# Patient Record
Sex: FEMALE | Race: WHITE | NOT HISPANIC OR LATINO | Employment: OTHER | URBAN - METROPOLITAN AREA
[De-identification: names, ages, dates, MRNs, and addresses within clinical notes are randomized per-mention and may not be internally consistent; named-entity substitution may affect disease eponyms.]

---

## 2017-01-01 ENCOUNTER — OFFICE VISIT (OUTPATIENT)
Dept: URGENT CARE | Facility: CLINIC | Age: 27
End: 2017-01-01
Payer: MEDICARE

## 2017-01-01 VITALS
DIASTOLIC BLOOD PRESSURE: 72 MMHG | SYSTOLIC BLOOD PRESSURE: 118 MMHG | TEMPERATURE: 98.6 F | BODY MASS INDEX: 44.3 KG/M2 | RESPIRATION RATE: 16 BRPM | HEART RATE: 87 BPM | OXYGEN SATURATION: 97 % | HEIGHT: 63 IN | WEIGHT: 250 LBS

## 2017-01-01 DIAGNOSIS — H65.193 OTHER ACUTE NONSUPPURATIVE OTITIS MEDIA OF BOTH EARS, RECURRENCE NOT SPECIFIED: ICD-10-CM

## 2017-01-01 DIAGNOSIS — J01.90 ACUTE RHINOSINUSITIS: ICD-10-CM

## 2017-01-01 PROCEDURE — 99214 OFFICE O/P EST MOD 30 MIN: CPT | Performed by: NURSE PRACTITIONER

## 2017-01-01 NOTE — PROGRESS NOTES
Chief Complaint   Patient presents with   • Sinus Problem     pt was seen x 4 days for ear infection in both ears, started cefpodoxime thursady, ears are getting worse and poss sinus infection       HISTORY OF PRESENT ILLNESS: Patient is a 26 y.o. female who presents today due to concerns of a recent ear infection. She was seen by her PCP four days ago and diagnosed with double ear infection, was placed on Cefpodoxime which she has been taking as prescribed. She believes her ears are somewhat improving but is now concerned about her headache, sinus pressure, subjective fever and chills.       Patient Active Problem List    Diagnosis Date Noted   • Quadriparesis ([facility]) 11/01/2016     Priority: High   • Paralysis ([facility]) 10/27/2016     Priority: High   • Sinus tachycardia 10/31/2013     Priority: High   • HTN (hypertension) 11/01/2016     Priority: Medium   • Chronic pain syndrome 10/27/2016     Priority: Medium   • Dysphagia 10/27/2016     Priority: Medium   • Depression 10/28/2016     Priority: Low   • Schizophrenia ([facility]) 10/27/2016     Priority: Low   • Suprapubic catheter ([facility]) 10/27/2016     Priority: Low   • Psychosis, schizophrenia, simple ([facility]) 09/29/2016     Priority: Low     Class: Chronic   • Hypothyroidism 11/23/2015     Priority: Low   • PCOS (polycystic ovarian syndrome) 11/23/2015     Priority: Low   • Chronic back pain 06/29/2015     Priority: Low   • Progressive focal motor weakness 06/28/2015     Priority: Low   • Fatty liver disease, nonalcoholic 01/19/2015     Priority: Low   • Anxiety 12/16/2014     Priority: Low   • Knee pain, right 02/13/2014     Priority: Low   • Neurogenic bladder 04/02/2011     Priority: Low   • Chronic UTI 09/18/2010     Priority: Low   • Multiple personality disorder 09/18/2010     Priority: Low   • Hypovitaminosis D 11/29/2016   • Bowel and bladder incontinence 10/27/2016   • Galactorrhea 07/22/2016   • Elevated sedimentation rate 06/27/2016   • Weakness of both lower  extremities 06/22/2016   • Right flank pain 06/06/2016   • Chronic inflammatory arthritis 05/23/2016   • Vitamin D deficiency 05/21/2016   • TONYA on CPAP 03/07/2016   • Morbidly obese (HCC) 03/07/2016   • Conversion disorder 03/07/2016   • Scoliosis 03/07/2016   • GERD (gastroesophageal reflux disease) 03/07/2016   • Peripheral neuropathy (HCC) 03/06/2016   • H/O prior ablation treatment 02/10/2016       Allergies:Cefdinir; Depakote; Abilify; Amitriptyline; Amoxicillin; Ciprofloxacin; Clindamycin; Doxycycline; Ees; Flagyl; Flomax; Metformin; Sulfa drugs; Tape; Vancomycin; Cephalexin; and Levofloxacin    Current Outpatient Prescriptions Ordered in [facility]   Medication Sig Dispense Refill   • cefpodoxime (VANTIN) 100 MG tablet Take 2 Tabs by mouth 2 times a day for 10 days. 40 Tab 0   • Cholecalciferol (VITAMIN D3) 93973 UNITS Cap Take 1 Each by mouth every 7 days. 4 Cap 4   • naproxen (NAPROSYN) 500 MG Tab Take 500 mg by mouth 2 times a day as needed (pain).     • lactulose 10 GM/15ML Solution Take 15 mL by mouth 2 times a day as needed. 1 Bottle 3   • albuterol 108 (90 BASE) MCG/ACT Aero Soln inhalation aerosol Inhale 2 Puffs by mouth every 6 hours as needed for Shortness of Breath.     • Coenzyme Q10 (EQL COQ10) 300 MG Cap Take 1 Cap by mouth every day at 6 PM.     • cyanocobalamin (HM VITAMIN B12) 500 MCG tablet Take 1,000 mcg by mouth 2 times a day.     • Melatonin 5 MG Tab Take 10 mg by mouth at bedtime as needed (sleep aid).     • Zinc Oxide 11.3 % Cream Apply 1 Application to affected area(s) 1 time daily as needed (for skin irritations to coccyx).     • sodium bicarbonate 325 MG Tab Take 2 Tabs by mouth 3 times a day.     • ziprasidone (GEODON) 80 MG Cap Take 160 mg by mouth every evening.     • promethazine (PHENERGAN) 25 MG Suppos Insert 1 Suppository in rectum every 6 hours as needed for Nausea/Vomiting. 16 Suppository 0   • levothyroxine (SYNTHROID) 75 MCG Tab Take 75 mcg by mouth Every morning on an empty  stomach.     • Ivabradine HCl (CORLANOR) 5 MG Tab Take 1 Tab by mouth 2 Times a Day. 60 Tab 6   • Gabapentin, Once-Daily, (GRALISE) 600 MG Tab Take 1 Tab by mouth 3 times a day. Pt reports she is taking 1 tab in a.m. and 2 tabs in p.m.     • omeprazole (PRILOSEC) 20 MG delayed-release capsule Take 20 mg by mouth 2 times a day. takes 1x daily most of time     • ranitidine (ZANTAC) 150 MG Tab Take 150 mg by mouth 2 times a day.       No current [facility]-ordered facility-administered medications on file.       Past Medical History   Diagnosis Date   • Scoliosis    • Multiple personality disorder    • Chronic UTI 9/18/2010   • Heart burn    • Pain 08-15-12     back, 7/10   • History of falling    • Sinus tachycardia 10/31/2013   • PCOS (polycystic ovarian syndrome)    • Urinary incontinence    • Hypertension    • Gynecological disorder      PCOS   • Disorder of thyroid    • Obesity    • Pneumonia 2012   • Tuberculosis      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   • Sleep apnea      CPAP   • Arthritis      osteo   • ASTHMA      when around smoke   • Urinary bladder disorder      SP cath   • Breath shortness      with tachycardia   • Anginal syndrome      random chest pain especially with tachycardia   • Psychosis        Social History   Substance Use Topics   • Smoking status: Passive Smoke Exposure - Never Smoker   • Smokeless tobacco: Never Used   • Alcohol Use: No       Family Status   Relation Status Death Age   • Mother Alive      44 2016   • Maternal Uncle Alive    • Maternal Grandmother Alive    • Father Alive      bio father hx unknown   • Sister Alive      Family History   Problem Relation Age of Onset   • Hypertension Mother    • Sleep Apnea Mother    • Heart Disease Mother    • Other Mother      hypothryod   • Hypertension Maternal Uncle    • Heart Disease Maternal Grandmother    • Hypertension Maternal Grandmother    • Other Sister      Narcolepsy;fibromyalsia;bone;nerve   • Genitourinary () Sister       "endometriosis       ROS:  Review of Systems   Constitutional: Positive for fever, chills. Negative for weight loss and malaise/fatigue.   HENT: Positive for ear pain, congestion, sinus pressure. Negative for sore throat and neck pain.    Eyes: Negative for vision changes.   Neuro: Negative for headache, sensory changes, weakness, seizure, LOC.   Cardiovascular: Negative for chest pain, palpitations, orthopnea and leg swelling.   Respiratory: Positive for cough. Negative for sputum production, shortness of breath and wheezing.   Gastrointestinal: Negative for abdominal pain, nausea, vomiting or diarrhea.   Genitourinary: Negative for dysuria, urgency and frequency.  Musculoskeletal: Negative for falls, neck pain, back pain, joint pain, myalgias.   Skin: Negative for rash, diaphoresis.     Exam:  Blood pressure 118/72, pulse 87, temperature 37 °C (98.6 °F), resp. rate 16, height 1.588 m (5' 2.5\"), weight 113.399 kg (250 lb), last menstrual period 07/01/2016, SpO2 97 %, not currently breastfeeding.  General: well-nourished, well-developed female in NAD  Head: normocephalic, atraumatic  Eyes: PERRLA, no conjunctival injection, acuity grossly intact, lids normal.  Ears: normal shape and symmetry, no tenderness, no discharge. External canals are without any significant edema or erythema. Bilateral tympanic membranes are mildly injected, no effusion, appeared to be healing well. Gross auditory acuity is intact.  Nose: symmetrical without tenderness, mild discharge.  Mouth/Throat: reasonable hygiene, no erythema, exudates or tonsillar enlargement.  Neck: no masses, range of motion within normal limits, no tracheal deviation. No obvious thyroid enlargement.   Lymph: no cervical adenopathy. No supraclavicular adenopathy.   Neuro: alert and oriented. Cranial nerves 1-12 grossly intact. No sensory deficit.   Cardiovascular: regular rate and rhythm. No edema.  Pulmonary: no distress. Chest is symmetrical with respiration, no " wheezes, crackles, or rhonchi.   Musculoskeletal: no clubbing, appropriate muscle tone, gait is stable.  Skin: warm, dry, intact, no clubbing, no cyanosis, no rashes.   Psych: appropriate mood, affect, judgement.         Assessment/Plan:  1. Acute rhinosinusitis     2. Other acute nonsuppurative otitis media of both ears, recurrence not specified         Previous clinic visit encounter reviewed and considered in medical decision making today.   Considering the patient's extensive allergy list, the medication that she is on the appropriate choice for her for ear infection and sinus infection. Her ear infection appears to be healing well. I have encouraged patient to continue the prescribed antibiotics as directed  Supportive care, differential diagnoses, and indications for immediate follow-up discussed with patient.   Pathogenesis of diagnosis discussed including typical length and natural progression.   Instructed to return to clinic or nearest emergency department for any change in condition, further concerns, or worsening of symptoms.  Patient states understanding of the plan of care and discharge instructions.  Instructed to make an appointment, for follow up, with their primary care provider.        Please note that this dictation was created using voice recognition software. I have made every reasonable attempt to correct obvious errors, but I expect that there are errors of grammar and possibly content that I did not discover before finalizing the note.      PER Mehta.

## 2017-01-01 NOTE — MR AVS SNAPSHOT
"        Kristinmarielena Balderrama   2017 9:15 AM   Office Visit   MRN: [id number]    Department:  [facility]   Dept Phone:  [phone]    Description:  Female : 1990   Provider:  VANIA Acosta           Reason for Visit     Sinus Problem pt was seen x 4 days for ear infection in both ears, started cefpodoxime thursady, ears are getting worse and poss sinus infection      Allergies as of 2017     Allergen Noted Reactions    Cefdinir 2016   Shortness of Breath, Itching    RXN=unknown    Depakote [Divalproex Sodium] 2010   Unspecified    Muscle spasms/muscle pain and weakness  RXN=unknown    Abilify 2013   Unspecified    Headaches/muscle twitching  RXN=unknown    Amitriptyline 10/31/2013   Unspecified    Headaches  RXN=unknown    Amoxicillin 2010   Rash    RXN=unknown    Ciprofloxacin 2009   Rash    RXN=unknown    Clindamycin 2011   Nausea    Even with food  RXN=unkown    Doxycycline 08/15/2012   Vomiting, Nausea    RXN=unknown    Ees [Erythromycin] 2010   Vomiting, Nausea    RXN=unknown    Flagyl [Metronidazole Hcl] 2011   Unspecified    \"eye problems\"  RXN=uknown    Flomax [Tamsulosin Hydrochloride] 2009   Swelling    RXN=unknown    Metformin 2013   Unspecified    Increased lactic acid   RXN=unknown    Sulfa Drugs 2010   Hives, Rash    RXN=since childhood    Tape 08/15/2012   Rash    Tears skin off   coban with Tegaderm tape ok  RXN=ongoing    Vancomycin 07/10/2016   Itching    Pt becomes flushed in face and chest.   RXN=7/10/16    Cephalexin [Keflex] 2017   Rash    Pt states she gets a rash with this medication    Levofloxacin 10/27/2016   Unspecified    Leg muscle cramps      You were diagnosed with     Acute sinusitis, recurrence not specified, unspecified location   [[id number]]         Vital Signs     Blood Pressure Pulse Temperature Respirations Height Weight    118/72 mmHg 87 37 °C (98.6 °F) 16 1.588 m (5' 2.5\") 113.399 " kg (250 lb)    Body Mass Index Oxygen Saturation Last Menstrual Period Breastfeeding? Smoking Status       44.97 kg/m2 97% 07/01/2016 No Passive Smoke Exposure - Never Smoker       Basic Information     Date Of Birth Sex Race Ethnicity Preferred Language    1990 Female White Non- English      Your appointments     Jan 03, 2017 To Be Determined   SN-HH-HOME VISIT + LPN SUP with Shaista Maki R.N.   [facility] (--)    [address]  [place] [address]   [phone]            Fredrick 10, 2017 To Be Determined   SN-HH-HOME VISIT + LPN SUP with Shaista Maki R.N.   [facility] (--)    [address]  [place] [address]   [phone]            Jan 12, 2017  9:20 AM   Established Patient with Torres Brody M.D.   [facility] [address]    [address]  [address]  [place] [address] [phone]           You will be receiving a confirmation call a few days before your appointment from our automated call confirmation system.            Jan 31, 2017  9:00 AM   Follow Up Med Management with Ronny Burnette M.D.   [facility] [address] [place] [address])    [address] [facility] [address]  [address]  [place] [address]   [phone]            Mar 01, 2017  9:00 AM   Individual Therapy with Blanca Brantley L.C.S.W.   [facility])    [address] [facility]  [address]  [place] [address] [phone]            Mar 15, 2017  9:00 AM   Individual Therapy with Blanca Brantley L.C.S.W.   [facility])    [address] [facility]  [address]  [place] [address] [phone]            Mar 29, 2017  9:00 AM   Individual Therapy with Blanca Brantley L.C.S.W.   [facility] ([facility])    [address] [facility]  [address]  [place] [address] [phone]            Apr 12, 2017  9:00 AM   Individual Therapy with Blanca Brantley L.C.S.W.   [facility] ([facility])    [address] [facility]  [address]  [place] [address]   [phone]            May 23, 2017  9:40 AM      FOLLOW UP with Torres Kumar M.D.   [facility] (--)    [address]  [place] [address] [phone]              Problem List              ICD-10-CM Priority Class Noted - Resolved    Chronic UTI N39.0 Low  9/18/2010 - Present    Multiple personality disorder F44.81 Low  9/18/2010 - Present    Neurogenic bladder N31.9 Low  4/2/2011 - Present    Sinus tachycardia R00.0 High  10/31/2013 - Present    Knee pain, right M25.561 Low  2/13/2014 - Present    Anxiety F41.9 Low  12/16/2014 - Present    Fatty liver disease, nonalcoholic K76.0 Low  1/19/2015 - Present    Progressive focal motor weakness M62.81 Low  6/28/2015 - Present    Chronic back pain M54.9, G89.29 Low  6/29/2015 - Present    Hypothyroidism E03.9 Low  11/23/2015 - Present    PCOS (polycystic ovarian syndrome) E28.2 Low  11/23/2015 - Present    H/O prior ablation treatment Z98.890   2/10/2016 - Present    Peripheral neuropathy (HCC) G62.9   3/6/2016 - Present    TONYA on CPAP G47.33   3/7/2016 - Present    Morbidly obese (HCC) E66.01   3/7/2016 - Present    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   3/7/2016 - Present    Scoliosis M41.9   3/7/2016 - Present    GERD (gastroesophageal reflux disease) K21.9   3/7/2016 - Present    Vitamin D deficiency E55.9   5/21/2016 - Present    Chronic inflammatory arthritis M19.90   5/23/2016 - Present    Right flank pain R10.9   6/6/2016 - Present    Weakness of both lower extremities M62.81   6/22/2016 - Present    Elevated sedimentation rate R70.0   6/27/2016 - Present    Galactorrhea O92.6   7/22/2016 - Present    Psychosis, schizophrenia, simple (HCC) (Chronic) F20.89 Low Chronic 9/29/2016 - Present    Schizophrenia (HCC) F20.9 Low  10/27/2016 - Present    Paralysis (HCC) G83.9 High  10/27/2016 - Present    Chronic pain syndrome G89.4 Medium  10/27/2016 - Present    Suprapubic catheter (HCC) Z93.59 Low  10/27/2016 - Present    Bowel and bladder incontinence R32, R15.9    "10/27/2016 - Present    Dysphagia R13.10 Medium  10/27/2016 - Present    Depression F32.9 Low  10/28/2016 - Present    HTN (hypertension) I10 Medium  11/1/2016 - Present    Quadriparesis (HCC) G82.50 High  11/1/2016 - Present    Hypovitaminosis D E55.9   11/29/2016 - Present      Health Maintenance        Date Due Completion Dates    IMM HEP A VACCINE (1 of 2 - Standard Series) 10/13/1990 ---    IMM VARICELLA (CHICKENPOX) VACCINE (1 of 2 - 2 Dose Adolescent Series) 10/13/2002 ---    PAP SMEAR 7/22/2019 7/22/2016 (Postponed), 2/19/2013 (N/S)    Override on 7/22/2016: Postponed (per pt was told could \"skip\" 2016)    Override on 2/19/2013: (N/S) ([facility])    IMM DTaP/Tdap/Td Vaccine (7 - Td) 8/6/2022 8/6/2012, 9/18/2010, 3/3/1994, 5/17/1991, 5/10/1990, 3/23/1990, 1990    COLONOSCOPY 9/25/2023 9/25/2013            Current Immunizations     Dtap Vaccine 3/3/1994, 5/17/1991, 5/10/1990, 3/23/1990, 1990    HIB Vaccine(PEDVAX) 1/11/1991    HPV Quadrivalent Vaccine (GARDASIL) 10/27/2011, 5/27/2011, 3/1/2011    Hepatitis B Vaccine Non-Recombivax (Ped/Adol) 1/28/2004, 10/28/2003, 10/29/1999    Influenza TIV (IM) 9/5/2013, 12/7/2011, 11/7/2011    Influenza Vaccine Adult HD 10/5/2016    MMR Vaccine 3/3/1994, 1/11/1991    OPV - Historical Data 3/3/1994, 5/17/1991, 5/10/1990, 3/23/1990, 1989    Pneumococcal Vaccine (PCV7) Historical Data 11/8/2015    TD Vaccine 9/18/2010  4:45 PM    Tdap Vaccine 8/6/2012      Below and/or attached are the medications your provider expects you to take. Review all of your home medications and newly ordered medications with your provider and/or pharmacist. Follow medication instructions as directed by your provider and/or pharmacist. Please keep your medication list with you and share with your provider. Update the information when medications are discontinued, doses are changed, or new medications (including over-the-counter products) are added; and carry medication information at all " times in the event of emergency situations     Allergies:  CEFDINIR - Shortness of Breath,Itching     DEPAKOTE - Unspecified     ABILIFY - Unspecified     AMITRIPTYLINE - Unspecified     AMOXICILLIN - Rash     CIPROFLOXACIN - Rash     CLINDAMYCIN - Nausea     DOXYCYCLINE - Vomiting,Nausea     EES - Vomiting,Nausea     FLAGYL - Unspecified     FLOMAX - Swelling     METFORMIN - Unspecified     SULFA DRUGS - Hives,Rash     TAPE - Rash     VANCOMYCIN - Itching     CEPHALEXIN - Rash     LEVOFLOXACIN - Unspecified               Medications  Valid as of: January 01, 2017 - 10:23 AM    Generic Name Brand Name Tablet Size Instructions for use    Albuterol Sulfate (Aero Soln) albuterol 108 (90 BASE) MCG/ACT Inhale 2 Puffs by mouth every 6 hours as needed for Shortness of Breath.        Cefpodoxime Proxetil (Tab) VANTIN 100 MG Take 2 Tabs by mouth 2 times a day for 10 days.        Cholecalciferol (Cap) Vitamin D3 85738 UNITS Take 1 Each by mouth every 7 days.        Coenzyme Q10 (Cap) Coenzyme Q10 300 MG Take 1 Cap by mouth every day at 6 PM.        Cyanocobalamin (Tab) vitamin b12 500 MCG Take 1,000 mcg by mouth 2 times a day.        Gabapentin (Once-Daily) (Tab) Gabapentin (Once-Daily) 600 MG Take 1 Tab by mouth 3 times a day. Pt reports she is taking 1 tab in a.m. and 2 tabs in p.m.        Ivabradine HCl (Tab) CORLANOR 5 MG Take 1 Tab by mouth 2 Times a Day.        Lactulose (Solution) lactulose 10 GM/15ML Take 15 mL by mouth 2 times a day as needed.        Levothyroxine Sodium (Tab) SYNTHROID 75 MCG Take 75 mcg by mouth Every morning on an empty stomach.        Melatonin (Tab) Melatonin 5 MG Take 10 mg by mouth at bedtime as needed (sleep aid).        Naproxen (Tab) NAPROSYN 500 MG Take 500 mg by mouth 2 times a day as needed (pain).        Omeprazole (CAPSULE DELAYED RELEASE) PRILOSEC 20 MG Take 20 mg by mouth 2 times a day. takes 1x daily most of time        Promethazine HCl (Suppos) PHENERGAN 25 MG Insert 1 Suppository  in rectum every 6 hours as needed for Nausea/Vomiting.        RaNITidine HCl (Tab) ZANTAC 150 MG Take 150 mg by mouth 2 times a day.        Sodium Bicarbonate (Tab) sodium bicarbonate 325 MG Take 2 Tabs by mouth 3 times a day.        Zinc Oxide (Cream) Zinc Oxide 11.3 % Apply 1 Application to affected area(s) 1 time daily as needed (for skin irritations to coccyx).        Ziprasidone HCl (Cap) GEODON 80 MG Take 160 mg by mouth every evening.        .                 Medicines prescribed today were sent to:     [id number] - [place], [place] - [address] [place] [address]    Phone: [phone] Fax: [phone]    Open 24 Hours?: No      Medication refill instructions:       If your prescription bottle indicates you have medication refills left, it is not necessary to call your provider’s office. Please contact your pharmacy and they will refill your medication.    If your prescription bottle indicates you do not have any refills left, you may request refills at any time through one of the following ways: The online [employer] system (except Urgent Care), by calling your provider’s office, or by asking your pharmacy to contact your provider’s office with a refill request. Medication refills are processed only during regular business hours and may not be available until the next business day. Your provider may request additional information or to have a follow-up visit with you prior to refilling your medication.   *Please Note: Medication refills are assigned a new Rx number when refilled electronically. Your pharmacy may indicate that no refills were authorized even though a new prescription for the same medication is available at the pharmacy. Please request the medicine by name with the pharmacy before contacting your provider for a refill.        Other Notes About Your Plan     DME:  [facility] / ph [phone] / fax [phone]              [employer] Access Code: Activation code  not generated  Current MyChart Status: Active

## 2017-01-02 ENCOUNTER — HOME CARE VISIT (OUTPATIENT)
Dept: HOME HEALTH SERVICES | Facility: HOME HEALTHCARE | Age: 27
End: 2017-01-02
Payer: MEDICARE

## 2017-01-02 ENCOUNTER — OFFICE VISIT (OUTPATIENT)
Dept: URGENT CARE | Facility: CLINIC | Age: 27
End: 2017-01-02
Payer: MEDICARE

## 2017-01-02 VITALS
HEART RATE: 103 BPM | BODY MASS INDEX: 46.01 KG/M2 | TEMPERATURE: 98.3 F | SYSTOLIC BLOOD PRESSURE: 116 MMHG | WEIGHT: 250 LBS | HEIGHT: 62 IN | DIASTOLIC BLOOD PRESSURE: 72 MMHG | OXYGEN SATURATION: 99 %

## 2017-01-02 DIAGNOSIS — N61.0 CELLULITIS OF LEFT BREAST: Primary | ICD-10-CM

## 2017-01-02 PROCEDURE — 99214 OFFICE O/P EST MOD 30 MIN: CPT | Performed by: PHYSICIAN ASSISTANT

## 2017-01-02 PROCEDURE — G0495 RN CARE TRAIN/EDU IN HH: HCPCS

## 2017-01-02 RX ORDER — PROMETHAZINE HYDROCHLORIDE 25 MG/1
25 TABLET ORAL EVERY 6 HOURS PRN
Qty: 30 TAB | Refills: 0 | Status: SHIPPED | OUTPATIENT
Start: 2017-01-02 | End: 2017-05-02 | Stop reason: SDUPTHER

## 2017-01-02 RX ORDER — CLINDAMYCIN HYDROCHLORIDE 300 MG/1
300 CAPSULE ORAL 3 TIMES DAILY
Qty: 30 CAP | Refills: 0 | Status: SHIPPED | OUTPATIENT
Start: 2017-01-02 | End: 2017-01-22

## 2017-01-02 NOTE — PROGRESS NOTES
"Subjective:      Pt is a 26 y.o. female who presents with Breast Problem            Breast Problem  This is a new problem. The current episode started 1 to 4 weeks ago. The problem occurs constantly. The problem has been gradually worsening. The symptoms are aggravated by exertion. She has tried ice (cefpodoxime) for the symptoms. The treatment provided no relief.   Pt states for the last 2 weeks she has had a red swollen left breast with a \"sore on it\". She states she has multiple allergies to abx and medicines. She states she was seen yesterday at the , but forgot to mention the issue. She states she has had a chronic sinus and ear infection and is on cefpodoxime for the last 5 days which she states is not helping her left breast sore. Pt has not taken any Rx medications for this condition. Pt states the pain is a 7/10, aching in nature and worse at night. Pt denies CP, SOB, NVD, paresthesias, headaches, dizziness, change in vision, hives, or other joint pain. The pt's medication list, problem list, and allergies have been evaluated and reviewed during today's visit.    PMH:  Past Medical History   Diagnosis Date   • Scoliosis    • Multiple personality disorder    • Chronic UTI 9/18/2010   • Heart burn    • Pain 08-15-12     back, 7/10   • History of falling    • Sinus tachycardia 10/31/2013   • PCOS (polycystic ovarian syndrome)    • Urinary incontinence    • Hypertension    • Gynecological disorder      PCOS   • Disorder of thyroid    • Obesity    • Pneumonia 2012   • Tuberculosis      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   • Sleep apnea      CPAP   • Arthritis      osteo   • ASTHMA      when around smoke   • Urinary bladder disorder      SP cath   • Breath shortness      with tachycardia   • Anginal syndrome      random chest pain especially with tachycardia   • Psychosis        PSH:  Past Surgical History   Procedure Laterality Date   • Neuro dest facet l/s w/ig sngl  4/21/2015     Performed by Reza" Leander at [facility]   • Recovery  1/27/2016     Procedure: CATH LAB EP STUDY WITH SINUS NODE MODIFICATION ABHINAV;  Surgeon: Lina Surgery;  [place] [facility]  Service:    • Katie by laparoscopy  8/29/2010     Performed by SHAYY JOHNS at SURGERY [facility] ORS   • Lumbar fusion anterior  8/21/2012     Performed by SUSIE SAWANT at SURGERY [facility] ORS   • Other cardiac surgery  1/2016     cardiac ablation   • Other       tonsillectomy   • Bowel stimulator insertion  7/13/2016     Procedure: BOWEL STIMULATOR INSERTION FOR PERMANENT INTERSTIM SACRAL IMPLANT;  Surgeon: Joe Noyola M.D.;  Location: SURGERY [facility];  Service:        Fam Hx:    family history includes Genitourinary () in her sister; Heart Disease in her maternal grandmother and mother; Hypertension in her maternal grandmother, maternal uncle, and mother; Other in her mother and sister; Sleep Apnea in her mother.  Family Status   Relation Status Death Age   • Mother Alive      44 2016   • Maternal Uncle Alive    • Maternal Grandmother Alive    • Father Alive      bio father hx unknown   • Sister Alive        Soc HX:  Social History     Social History   • Marital Status: Single     Spouse Name: N/A   • Number of Children: N/A   • Years of Education: N/A     Occupational History   • Not on file.     Social History Main Topics   • Smoking status: Passive Smoke Exposure - Never Smoker   • Smokeless tobacco: Never Used   • Alcohol Use: No   • Drug Use: No   • Sexual Activity: Not Currently     Birth Control/ Protection: Pill     Other Topics Concern   • Not on file     Social History Narrative    ** Merged History Encounter **              Medications:    Current outpatient prescriptions:   •  promethazine (PHENERGAN) 25 MG Tab, Take 1 Tab by mouth every 6 hours as needed for Nausea/Vomiting., Disp: 30 Tab, Rfl: 0  •  clindamycin (CLEOCIN) 300 MG Cap, Take 1 Cap by mouth 3 times a day.,  Disp: 30 Cap, Rfl: 0  •  cefpodoxime (VANTIN) 100 MG tablet, Take 2 Tabs by mouth 2 times a day for 10 days., Disp: 40 Tab, Rfl: 0  •  Cholecalciferol (VITAMIN D3) 85122 UNITS Cap, Take 1 Each by mouth every 7 days., Disp: 4 Cap, Rfl: 4  •  naproxen (NAPROSYN) 500 MG Tab, Take 500 mg by mouth 2 times a day as needed (pain)., Disp: , Rfl:   •  lactulose 10 GM/15ML Solution, Take 15 mL by mouth 2 times a day as needed., Disp: 1 Bottle, Rfl: 3  •  albuterol 108 (90 BASE) MCG/ACT Aero Soln inhalation aerosol, Inhale 2 Puffs by mouth every 6 hours as needed for Shortness of Breath., Disp: , Rfl:   •  Coenzyme Q10 (EQL COQ10) 300 MG Cap, Take 1 Cap by mouth every day at 6 PM., Disp: , Rfl:   •  cyanocobalamin (HM VITAMIN B12) 500 MCG tablet, Take 1,000 mcg by mouth 2 times a day., Disp: , Rfl:   •  Melatonin 5 MG Tab, Take 10 mg by mouth at bedtime as needed (sleep aid)., Disp: , Rfl:   •  Zinc Oxide 11.3 % Cream, Apply 1 Application to affected area(s) 1 time daily as needed (for skin irritations to coccyx)., Disp: , Rfl:   •  sodium bicarbonate 325 MG Tab, Take 2 Tabs by mouth 3 times a day., Disp: , Rfl:   •  ziprasidone (GEODON) 80 MG Cap, Take 160 mg by mouth every evening., Disp: , Rfl:   •  promethazine (PHENERGAN) 25 MG Suppos, Insert 1 Suppository in rectum every 6 hours as needed for Nausea/Vomiting., Disp: 16 Suppository, Rfl: 0  •  levothyroxine (SYNTHROID) 75 MCG Tab, Take 75 mcg by mouth Every morning on an empty stomach., Disp: , Rfl:   •  Ivabradine HCl (CORLANOR) 5 MG Tab, Take 1 Tab by mouth 2 Times a Day., Disp: 60 Tab, Rfl: 6  •  Gabapentin, Once-Daily, (GRALISE) 600 MG Tab, Take 1 Tab by mouth 3 times a day. Pt reports she is taking 1 tab in a.m. and 2 tabs in p.m., Disp: , Rfl:   •  omeprazole (PRILOSEC) 20 MG delayed-release capsule, Take 20 mg by mouth 2 times a day. takes 1x daily most of time, Disp: , Rfl:   •  ranitidine (ZANTAC) 150 MG Tab, Take 150 mg by mouth 2 times a day., Disp: , Rfl:  "      Allergies:  Cefdinir; Depakote; Abilify; Amitriptyline; Amoxicillin; Ciprofloxacin; Clindamycin; Doxycycline; Ees; Flagyl; Flomax; Metformin; Sulfa drugs; Tape; Vancomycin; Cephalexin; and Levofloxacin      ROS  Constitutional: Negative for fever, chills and malaise/fatigue.   HENT: Negative for congestion and sore throat.    Eyes: Negative for blurred vision, double vision and photophobia.   Respiratory: Negative for cough and shortness of breath.    Cardiovascular: Negative for chest pain and palpitations.   Gastrointestinal: Negative for heartburn, nausea, vomiting, abdominal pain, diarrhea and constipation.   Genitourinary: Negative for dysuria and flank pain.   Musculoskeletal: Negative for joint pain and myalgias.   Skin: Negative for itching and rash. +left breast infection.  Neurological: Negative for dizziness, tingling and headaches.   Endo/Heme/Allergies: Does not bruise/bleed easily.   Psychiatric/Behavioral: Negative for depression. The patient is not nervous/anxious.           Objective:     /72 mmHg  Pulse 103  Temp(Src) 36.8 °C (98.3 °F)  Resp   Ht 1.581 m (5' 2.25\")  Wt 113.399 kg (250 lb)  BMI 45.37 kg/m2  SpO2 99%  LMP 07/01/2016  Breastfeeding? No     Physical Exam   Pulmonary/Chest: Chest wall is not dull to percussion. She exhibits tenderness, edema and swelling. She exhibits no mass, no bony tenderness, no laceration, no crepitus, no deformity and no retraction. Right breast exhibits no inverted nipple, no mass, no nipple discharge, no skin change and no tenderness. Left breast exhibits skin change and tenderness. Left breast exhibits no inverted nipple, no mass and no nipple discharge. Breasts are symmetrical. There is breast swelling.       Genitourinary: There is breast tenderness. No breast discharge or bleeding.         Constitutional: PT is oriented to person, place, and time. PT appears well-developed and well-nourished. No distress.   HENT:   Head: Normocephalic " and atraumatic.   Mouth/Throat: Oropharynx is clear and moist. No oropharyngeal exudate.   Eyes: Conjunctivae normal and EOM are normal. Pupils are equal, round, and reactive to light.   Neck: Normal range of motion. Neck supple. No thyromegaly present.   Cardiovascular: Normal rate, regular rhythm, normal heart sounds and intact distal pulses.  Exam reveals no gallop and no friction rub.    No murmur heard.  Pulmonary/Chest: Effort normal and breath sounds normal. No respiratory distress. PT has no wheezes. PT has no rales. Pt exhibits no tenderness.   Abdominal: Soft. Bowel sounds are normal. PT exhibits no distension and no mass. There is no tenderness. There is no rebound and no guarding.   Musculoskeletal: Normal range of motion. PT exhibits no edema and no tenderness.   Neurological: PT is alert and oriented to person, place, and time. PT has normal reflexes. No cranial nerve deficit.   Skin: Skin is warm and dry. No rash noted. PT is not diaphoretic. No erythema.       Psychiatric: PT has a normal mood and affect. PT behavior is normal. Judgment and thought content normal.          Assessment/Plan:     1. Cellulitis of left breast    - promethazine (PHENERGAN) 25 MG Tab; Take 1 Tab by mouth every 6 hours as needed for Nausea/Vomiting.  Dispense: 30 Tab; Refill: 0  - clindamycin (CLEOCIN) 300 MG Cap; Take 1 Cap by mouth 3 times a day.  Dispense: 30 Cap; Refill: 0    Pt states she gets nausea on clindamycin and with all of her other allergies to abx, this seems the best choice with taking phenergan 20-30 mins prior to her clindamycin  Rest, fluids encouraged.  AVS with medical info given.  Pt was in full understanding and agreement with the plan.  Follow-up as needed if symptoms worsen or fail to improve.

## 2017-01-02 NOTE — MR AVS SNAPSHOT
"        Kristin Tacos   2017 8:15 AM   Office Visit   MRN: [id number]    Department[place]  [facility] [place]   Dept Phone:  [phone]    Description:  Female : 1990   Provider:  Pineda Bansal PA-C           Reason for Visit     Breast Problem lft breast has wound, red swollen x 2wks, getting worse      Allergies as of 2017     Allergen Noted Reactions    Cefdinir 2016   Shortness of Breath, Itching    RXN=unknown    Depakote [Divalproex Sodium] 2010   Unspecified    Muscle spasms/muscle pain and weakness  RXN=unknown    Abilify 2013   Unspecified    Headaches/muscle twitching  RXN=unknown    Amitriptyline 10/31/2013   Unspecified    Headaches  RXN=unknown    Amoxicillin 2010   Rash    RXN=unknown    Ciprofloxacin 2009   Rash    RXN=unknown    Clindamycin 2011   Nausea    Even with food  RXN=unkown    Doxycycline 08/15/2012   Vomiting, Nausea    RXN=unknown    Ees [Erythromycin] 2010   Vomiting, Nausea    RXN=unknown    Flagyl [Metronidazole Hcl] 2011   Unspecified    \"eye problems\"  RXN=uknown    Flomax [Tamsulosin Hydrochloride] 2009   Swelling    RXN=unknown    Metformin 2013   Unspecified    Increased lactic acid   RXN=unknown    Sulfa Drugs 2010   Hives, Rash    RXN=since childhood    Tape 08/15/2012   Rash    Tears skin off   coban with Tegaderm tape ok  RXN=ongoing    Vancomycin 07/10/2016   Itching    Pt becomes flushed in face and chest.   RXN=7/10/16    Cephalexin [Keflex] 2017   Rash    Pt states she gets a rash with this medication    Levofloxacin 10/27/2016   Unspecified    Leg muscle cramps      You were diagnosed with     Cellulitis of left breast   [[id number]]  -  Primary       Vital Signs     Blood Pressure Pulse Temperature Height Weight       116/72 mmHg 103 36.8 °C (98.3 °F) 1.581 m (5' 2.25\") 113.399 kg (250 lb)     Body Mass Index Oxygen Saturation Last Menstrual Period Breastfeeding? Smoking Status      " 45.37 kg/m2 99% 07/01/2016 No Passive Smoke Exposure - Never Smoker       Basic Information     Date Of Birth Sex Race Ethnicity Preferred Language    1990 Female White Non- English      Your appointments     Jan 03, 2017 To Be Determined   SN-HH-HOME VISIT + LPN SUP with Shaista Maki R.N.   [facility] (--)    [address]  [place] [address]   [phone]            Fredrick 10, 2017 To Be Determined   SN-HH-HOME VISIT + LPN SUP with Shaista Maki R.N.   [facility] (--)    [address]  [place] [address]   [phone]            Jan 12, 2017  9:20 AM   Established Patient with Torres Brody M.D.   [facility] [address]    [address]  [address]  [place] [address]   [phone]           You will be receiving a confirmation call a few days before your appointment from our automated call confirmation system.            Jan 31, 2017  9:00 AM   Follow Up Med Management with Ronny Burnette M.D.   [facility] [address] [place] [address])    [address] [place] [address]  [address]  [place] [address]   [phone]            Mar 01, 2017  9:00 AM   Individual Therapy with Blanca Brantley L.C.S.W.   [facility]    [address] [facility]  [address]  [place] [address] [phone]            Mar 15, 2017  9:00 AM   Individual Therapy with Blanca Brantley L.C.S.W.   [facility])    [address] [facility]  [address]  [place] [address] [phone]            Mar 29, 2017  9:00 AM   Individual Therapy with Blanca Brantley L.C.S.W.   [facility])    [address] [facility]  [address]  [place] [address] [phone]            Apr 12, 2017  9:00 AM   Individual Therapy with Blanca Brantley L.C.S.W.   [facility])    [address] [facility]  [address]  [place] [address] [phone]            May 23, 2017  9:40 AM   FOLLOW UP with Torres Kumar M.D.   [facility] (--)    [address]  [facility] [address]   [phone]              Problem List              ICD-10-CM Priority Class Noted - Resolved    Chronic UTI N39.0 Low  9/18/2010 - Present    Multiple personality disorder F44.81 Low  9/18/2010 - Present    Neurogenic bladder N31.9 Low  4/2/2011 - Present    Sinus tachycardia R00.0 High  10/31/2013 - Present    Knee pain, right M25.561 Low  2/13/2014 - Present    Anxiety F41.9 Low  12/16/2014 - Present    Fatty liver disease, nonalcoholic K76.0 Low  1/19/2015 - Present    Progressive focal motor weakness M62.81 Low  6/28/2015 - Present    Chronic back pain M54.9, G89.29 Low  6/29/2015 - Present    Hypothyroidism E03.9 Low  11/23/2015 - Present    PCOS (polycystic ovarian syndrome) E28.2 Low  11/23/2015 - Present    H/O prior ablation treatment Z98.890   2/10/2016 - Present    Peripheral neuropathy (HCC) G62.9   3/6/2016 - Present    TONYA on CPAP G47.33   3/7/2016 - Present    Morbidly obese (HCC) E66.01   3/7/2016 - Present    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   3/7/2016 - Present    Scoliosis M41.9   3/7/2016 - Present    GERD (gastroesophageal reflux disease) K21.9   3/7/2016 - Present    Vitamin D deficiency E55.9   5/21/2016 - Present    Chronic inflammatory arthritis M19.90   5/23/2016 - Present    Right flank pain R10.9   6/6/2016 - Present    Weakness of both lower extremities M62.81   6/22/2016 - Present    Elevated sedimentation rate R70.0   6/27/2016 - Present    Galactorrhea O92.6   7/22/2016 - Present    Psychosis, schizophrenia, simple (HCC) (Chronic) F20.89 Low Chronic 9/29/2016 - Present    Schizophrenia (HCC) F20.9 Low  10/27/2016 - Present    Paralysis (HCC) G83.9 High  10/27/2016 - Present    Chronic pain syndrome G89.4 Medium  10/27/2016 - Present    Suprapubic catheter (HCC) Z93.59 Low  10/27/2016 - Present    Bowel and bladder incontinence R32, R15.9   10/27/2016 - Present    Dysphagia R13.10 Medium  10/27/2016 - Present    Depression F32.9 Low  10/28/2016 - Present  "   HTN (hypertension) I10 Medium  11/1/2016 - Present    Quadriparesis (HCC) G82.50 High  11/1/2016 - Present    Hypovitaminosis D E55.9   11/29/2016 - Present      Health Maintenance        Date Due Completion Dates    IMM HEP A VACCINE (1 of 2 - Standard Series) 10/13/1990 ---    IMM VARICELLA (CHICKENPOX) VACCINE (1 of 2 - 2 Dose Adolescent Series) 10/13/2002 ---    PAP SMEAR 7/22/2019 7/22/2016 (Postponed), 2/19/2013 (N/S)    Override on 7/22/2016: Postponed (per pt was told could \"skip\" 2016)    Override on 2/19/2013: (N/S) ([facility])    IMM DTaP/Tdap/Td Vaccine (7 - Td) 8/6/2022 8/6/2012, 9/18/2010, 3/3/1994, 5/17/1991, 5/10/1990, 3/23/1990, 1989    COLONOSCOPY 9/25/2023 9/25/2013            Current Immunizations     Dtap Vaccine 3/3/1994, 5/17/1991, 5/10/1990, 3/23/1990, 1989    HIB Vaccine(PEDVAX) 1/11/1991    HPV Quadrivalent Vaccine (GARDASIL) 10/27/2011, 5/27/2011, 3/1/2011    Hepatitis B Vaccine Non-Recombivax (Ped/Adol) 1/28/2004, 10/28/2003, 10/29/1999    Influenza TIV (IM) 9/5/2013, 12/7/2011, 11/7/2011    Influenza Vaccine Adult HD 10/5/2016    MMR Vaccine 3/3/1994, 1/11/1991    OPV - Historical Data 3/3/1994, 5/17/1991, 5/10/1990, 3/23/1990, 1989    Pneumococcal Vaccine (PCV7) Historical Data 11/8/2015    TD Vaccine 9/18/2010  4:45 PM    Tdap Vaccine 8/6/2012      Below and/or attached are the medications your provider expects you to take. Review all of your home medications and newly ordered medications with your provider and/or pharmacist. Follow medication instructions as directed by your provider and/or pharmacist. Please keep your medication list with you and share with your provider. Update the information when medications are discontinued, doses are changed, or new medications (including over-the-counter products) are added; and carry medication information at all times in the event of emergency situations     Allergies:  CEFDINIR - Shortness of Breath,Itching     DEPAKOTE - " Unspecified     ABILIFY - Unspecified     AMITRIPTYLINE - Unspecified     AMOXICILLIN - Rash     CIPROFLOXACIN - Rash     CLINDAMYCIN - Nausea     DOXYCYCLINE - Vomiting,Nausea     EES - Vomiting,Nausea     FLAGYL - Unspecified     FLOMAX - Swelling     METFORMIN - Unspecified     SULFA DRUGS - Hives,Rash     TAPE - Rash     VANCOMYCIN - Itching     CEPHALEXIN - Rash     LEVOFLOXACIN - Unspecified               Medications  Valid as of: January 02, 2017 -  8:31 AM    Generic Name Brand Name Tablet Size Instructions for use    Albuterol Sulfate (Aero Soln) albuterol 108 (90 BASE) MCG/ACT Inhale 2 Puffs by mouth every 6 hours as needed for Shortness of Breath.        Cefpodoxime Proxetil (Tab) VANTIN 100 MG Take 2 Tabs by mouth 2 times a day for 10 days.        Cholecalciferol (Cap) Vitamin D3 02953 UNITS Take 1 Each by mouth every 7 days.        Clindamycin HCl (Cap) CLEOCIN 300 MG Take 1 Cap by mouth 3 times a day.        Coenzyme Q10 (Cap) Coenzyme Q10 300 MG Take 1 Cap by mouth every day at 6 PM.        Cyanocobalamin (Tab) vitamin b12 500 MCG Take 1,000 mcg by mouth 2 times a day.        Gabapentin (Once-Daily) (Tab) Gabapentin (Once-Daily) 600 MG Take 1 Tab by mouth 3 times a day. Pt reports she is taking 1 tab in a.m. and 2 tabs in p.m.        Ivabradine HCl (Tab) CORLANOR 5 MG Take 1 Tab by mouth 2 Times a Day.        Lactulose (Solution) lactulose 10 GM/15ML Take 15 mL by mouth 2 times a day as needed.        Levothyroxine Sodium (Tab) SYNTHROID 75 MCG Take 75 mcg by mouth Every morning on an empty stomach.        Melatonin (Tab) Melatonin 5 MG Take 10 mg by mouth at bedtime as needed (sleep aid).        Naproxen (Tab) NAPROSYN 500 MG Take 500 mg by mouth 2 times a day as needed (pain).        Omeprazole (CAPSULE DELAYED RELEASE) PRILOSEC 20 MG Take 20 mg by mouth 2 times a day. takes 1x daily most of time        Promethazine HCl (Suppos) PHENERGAN 25 MG Insert 1 Suppository in rectum every 6 hours as needed  for Nausea/Vomiting.        Promethazine HCl (Tab) PHENERGAN 25 MG Take 1 Tab by mouth every 6 hours as needed for Nausea/Vomiting.        RaNITidine HCl (Tab) ZANTAC 150 MG Take 150 mg by mouth 2 times a day.        Sodium Bicarbonate (Tab) sodium bicarbonate 325 MG Take 2 Tabs by mouth 3 times a day.        Zinc Oxide (Cream) Zinc Oxide 11.3 % Apply 1 Application to affected area(s) 1 time daily as needed (for skin irritations to coccyx).        Ziprasidone HCl (Cap) GEODON 80 MG Take 160 mg by mouth every evening.        .                 Medicines prescribed today were sent to:     [id number] - [place], [place] - [address]    [address] [facility] [place] [address]    Phone: [phone] Fax: [phone]    Open 24 Hours?: No      Medication refill instructions:       If your prescription bottle indicates you have medication refills left, it is not necessary to call your provider’s office. Please contact your pharmacy and they will refill your medication.    If your prescription bottle indicates you do not have any refills left, you may request refills at any time through one of the following ways: The online [employer] system (except Urgent Care), by calling your provider’s office, or by asking your pharmacy to contact your provider’s office with a refill request. Medication refills are processed only during regular business hours and may not be available until the next business day. Your provider may request additional information or to have a follow-up visit with you prior to refilling your medication.   *Please Note: Medication refills are assigned a new Rx number when refilled electronically. Your pharmacy may indicate that no refills were authorized even though a new prescription for the same medication is available at the pharmacy. Please request the medicine by name with the pharmacy before contacting your provider for a refill.        Instructions    Cellulitis  Cellulitis is an infection of the  skin and the tissue beneath it. The infected area is usually red and tender. Cellulitis occurs most often in the arms and lower legs.   CAUSES   Cellulitis is caused by bacteria that enter the skin through cracks or cuts in the skin. The most common types of bacteria that cause cellulitis are staphylococci and streptococci.  SIGNS AND SYMPTOMS   · Redness and warmth.  · Swelling.  · Tenderness or pain.  · Fever.  DIAGNOSIS   Your health care provider can usually determine what is wrong based on a physical exam. Blood tests may also be done.  TREATMENT   Treatment usually involves taking an antibiotic medicine.  HOME CARE INSTRUCTIONS   · Take your antibiotic medicine as directed by your health care provider. Finish the antibiotic even if you start to feel better.  · Keep the infected arm or leg elevated to reduce swelling.  · Apply a warm cloth to the affected area up to 4 times per day to relieve pain.  · Take medicines only as directed by your health care provider.  · Keep all follow-up visits as directed by your health care provider.  SEEK MEDICAL CARE IF:   · You notice red streaks coming from the infected area.  · Your red area gets larger or turns dark in color.  · Your bone or joint underneath the infected area becomes painful after the skin has healed.  · Your infection returns in the same area or another area.  · You notice a swollen bump in the infected area.  · You develop new symptoms.  · You have a fever.  SEEK IMMEDIATE MEDICAL CARE IF:   · You feel very sleepy.  · You develop vomiting or diarrhea.  · You have a general ill feeling (malaise) with muscle aches and pains.  MAKE SURE YOU:   · Understand these instructions.  · Will watch your condition.  · Will get help right away if you are not doing well or get worse.     This information is not intended to replace advice given to you by your health care provider. Make sure you discuss any questions you have with your health care provider.     Document  Released: 09/27/2006 Document Revised: 01/08/2016 Document Reviewed: 03/04/2013  [employer] Patient Education ©2016 [employer]         Other Notes About Your Plan     DME:  Erika Puri /  [phone] / fax [phone]              MyChart Access Code: Activation code not generated  Current MyChart Status: Active

## 2017-01-02 NOTE — PATIENT INSTRUCTIONS
Cellulitis  Cellulitis is an infection of the skin and the tissue beneath it. The infected area is usually red and tender. Cellulitis occurs most often in the arms and lower legs.   CAUSES   Cellulitis is caused by bacteria that enter the skin through cracks or cuts in the skin. The most common types of bacteria that cause cellulitis are staphylococci and streptococci.  SIGNS AND SYMPTOMS   · Redness and warmth.  · Swelling.  · Tenderness or pain.  · Fever.  DIAGNOSIS   Your health care provider can usually determine what is wrong based on a physical exam. Blood tests may also be done.  TREATMENT   Treatment usually involves taking an antibiotic medicine.  HOME CARE INSTRUCTIONS   · Take your antibiotic medicine as directed by your health care provider. Finish the antibiotic even if you start to feel better.  · Keep the infected arm or leg elevated to reduce swelling.  · Apply a warm cloth to the affected area up to 4 times per day to relieve pain.  · Take medicines only as directed by your health care provider.  · Keep all follow-up visits as directed by your health care provider.  SEEK MEDICAL CARE IF:   · You notice red streaks coming from the infected area.  · Your red area gets larger or turns dark in color.  · Your bone or joint underneath the infected area becomes painful after the skin has healed.  · Your infection returns in the same area or another area.  · You notice a swollen bump in the infected area.  · You develop new symptoms.  · You have a fever.  SEEK IMMEDIATE MEDICAL CARE IF:   · You feel very sleepy.  · You develop vomiting or diarrhea.  · You have a general ill feeling (malaise) with muscle aches and pains.  MAKE SURE YOU:   · Understand these instructions.  · Will watch your condition.  · Will get help right away if you are not doing well or get worse.     This information is not intended to replace advice given to you by your health care provider. Make sure you discuss any questions you have  with your health care provider.     Document Released: 09/27/2006 Document Revised: 01/08/2016 Document Reviewed: 03/04/2013  Elsevier Interactive Patient Education ©2016 [employer]

## 2017-01-03 VITALS
SYSTOLIC BLOOD PRESSURE: 120 MMHG | TEMPERATURE: 98 F | DIASTOLIC BLOOD PRESSURE: 70 MMHG | HEART RATE: 89 BPM | RESPIRATION RATE: 20 BRPM

## 2017-01-03 DIAGNOSIS — Z01.812 PRE-OPERATIVE LABORATORY EXAMINATION: ICD-10-CM

## 2017-01-03 LAB — HCG SERPL QL: NEGATIVE

## 2017-01-03 PROCEDURE — 84703 CHORIONIC GONADOTROPIN ASSAY: CPT

## 2017-01-03 PROCEDURE — 36415 COLL VENOUS BLD VENIPUNCTURE: CPT

## 2017-01-03 RX ORDER — OXYCODONE AND ACETAMINOPHEN 10; 325 MG/1; MG/1
2 TABLET ORAL EVERY 6 HOURS
Status: ON HOLD | COMMUNITY
End: 2017-03-19

## 2017-01-03 NOTE — OR NURSING
Preadmit appointment:  Patient instructed to continue regularly prescribed medications through day before surgery.  Instructed to take the following medications the  day of surgery with a sip of water per anesthesia protocol: albuterol, Gabapentin, Ivabradine, Levothyroxine, Omeprazole, Oxycodone, Ranitidine,

## 2017-01-03 NOTE — OR NURSING
Reviewed patient's medical history/ BMI with Dr. Weston, based upon information available, Dr Weston indicates that the patient is a candidate to have the procedure at [facility]

## 2017-01-04 ENCOUNTER — HOME CARE VISIT (OUTPATIENT)
Dept: HOME HEALTH SERVICES | Facility: HOME HEALTHCARE | Age: 27
End: 2017-01-04
Payer: MEDICARE

## 2017-01-04 ENCOUNTER — HOSPITAL ENCOUNTER (OUTPATIENT)
Facility: MEDICAL CENTER | Age: 27
End: 2017-01-04
Attending: INTERNAL MEDICINE | Admitting: INTERNAL MEDICINE
Payer: MEDICARE

## 2017-01-04 VITALS
SYSTOLIC BLOOD PRESSURE: 153 MMHG | WEIGHT: 265.43 LBS | DIASTOLIC BLOOD PRESSURE: 88 MMHG | RESPIRATION RATE: 20 BRPM | TEMPERATURE: 97.7 F | HEART RATE: 106 BPM | OXYGEN SATURATION: 94 % | BODY MASS INDEX: 48.85 KG/M2 | HEIGHT: 62 IN

## 2017-01-04 PROBLEM — R29.898 LEG WEAKNESS: Status: ACTIVE | Noted: 2017-01-04

## 2017-01-04 LAB — PATHOLOGY CONSULT NOTE: NORMAL

## 2017-01-04 PROCEDURE — 503369 HCHG GOLDPROBE, 7 FR: Performed by: INTERNAL MEDICINE

## 2017-01-04 PROCEDURE — 160002 HCHG RECOVERY MINUTES (STAT): Performed by: INTERNAL MEDICINE

## 2017-01-04 PROCEDURE — 501629 HCHG TUBE, LUKI TRAP STERILE DISP: Performed by: INTERNAL MEDICINE

## 2017-01-04 PROCEDURE — 160025 RECOVERY II MINUTES (STATS): Performed by: INTERNAL MEDICINE

## 2017-01-04 PROCEDURE — 160035 HCHG PACU - 1ST 60 MINS PHASE I: Performed by: INTERNAL MEDICINE

## 2017-01-04 PROCEDURE — 160203 HCHG ENDO MINUTES - 1ST 30 MINS LEVEL 4: Performed by: INTERNAL MEDICINE

## 2017-01-04 PROCEDURE — 502240 HCHG MISC OR SUPPLY RC 0272: Performed by: INTERNAL MEDICINE

## 2017-01-04 PROCEDURE — G0162 HHC RN E&M PLAN SVS, 15 MIN: HCPCS

## 2017-01-04 PROCEDURE — 503192 HCHG GOLDPROBE, 10FR: Performed by: INTERNAL MEDICINE

## 2017-01-04 PROCEDURE — 503086: Performed by: INTERNAL MEDICINE

## 2017-01-04 PROCEDURE — 503082 HCHG INFLATOR (ENDO): Performed by: INTERNAL MEDICINE

## 2017-01-04 PROCEDURE — 88305 TISSUE EXAM BY PATHOLOGIST: CPT

## 2017-01-04 PROCEDURE — 160046 HCHG PACU - 1ST 60 MINS PHASE II: Performed by: INTERNAL MEDICINE

## 2017-01-04 PROCEDURE — 503078 HCHG PROBE ERBE: Performed by: INTERNAL MEDICINE

## 2017-01-04 PROCEDURE — 500066 HCHG BITE BLOCK, ECT: Performed by: INTERNAL MEDICINE

## 2017-01-04 PROCEDURE — 700111 HCHG RX REV CODE 636 W/ 250 OVERRIDE (IP)

## 2017-01-04 PROCEDURE — 503105 HCHG CLIP FIXING DEVICE ENDO: Performed by: INTERNAL MEDICINE

## 2017-01-04 PROCEDURE — 160048 HCHG OR STATISTICAL LEVEL 1-5: Performed by: INTERNAL MEDICINE

## 2017-01-04 PROCEDURE — 160009 HCHG ANES TIME/MIN: Performed by: INTERNAL MEDICINE

## 2017-01-04 PROCEDURE — C1726 CATH, BAL DIL, NON-VASCULAR: HCPCS | Performed by: INTERNAL MEDICINE

## 2017-01-04 RX ORDER — MIDAZOLAM HYDROCHLORIDE 1 MG/ML
INJECTION INTRAMUSCULAR; INTRAVENOUS
Status: DISCONTINUED
Start: 2017-01-04 | End: 2017-01-04 | Stop reason: HOSPADM

## 2017-01-04 ASSESSMENT — PAIN SCALES - GENERAL
PAINLEVEL_OUTOF10: 0

## 2017-01-04 NOTE — DISCHARGE SUMMARY
Procedure: EGD esophageal dilation and biopsy  Anesthesia: See anesthesiologist note  Findings:  Nml, random esophageal biopsies and esophagus dilated to 45 Fr  Plan  Will arrange outpatient esophageal motility testing and follow up

## 2017-01-04 NOTE — IP AVS SNAPSHOT
" After Visit Summary                                                                                                                Name:Kristin Balderrama  Medical Record Number:[id number]  CSN: [id number]    YOB: 1990   Age: 26 y.o.  Sex: female  HT:1.575 m (5' 2\") WT: 120.4 kg (265 lb 6.9 oz)          Admit Date: 1/4/2017     Discharge Date:   Today's Date: 1/4/2017  Attending Doctor:  Torres Vargas M.D.                  Allergies:  Cefdinir; Depakote; Abilify; Amitriptyline; Amoxicillin; Ciprofloxacin; Clindamycin; Doxycycline; Ees; Flagyl; Flomax; Metformin; Sulfa drugs; Tape; Vancomycin; Cephalexin; and Levofloxacin              Follow-up Information     1. Follow up with Torres Vargas M.D..    Specialty:  Gastroenterology    Why:  Dr. Robertson office will contact you to arrange for esophageal motility testing    Contact information    [address] [place] [id number]  [phone]          Discharge Instructions         ACTIVITY: Rest and take it easy for the first 24 hours.  A responsible adult is recommended to remain with you during that time.  It is normal to feel sleepy.  We encourage you to not do anything that requires balance, judgment or coordination.    MILD FLU-LIKE SYMPTOMS ARE NORMAL. YOU MAY EXPERIENCE GENERALIZED MUSCLE ACHES, THROAT IRRITATION, HEADACHE AND/OR SOME NAUSEA.    FOR 24 HOURS DO NOT:  Drive, operate machinery or run household appliances.  Drink beer or alcoholic beverages.   Make important decisions or sign legal documents.    SPECIAL INSTRUCTIONS: Resume regular diet as tolerated,       DIET: To avoid nausea, slowly advance diet as tolerated, avoiding spicy or greasy foods for the first day.  Add more substantial food to your diet according to your physician's instructions.  Babies can be fed formula or breast milk as soon as they are hungry.  INCREASE FLUIDS AND FIBER TO AVOID CONSTIPATION.    SURGICAL DRESSING/BATHING: none    FOLLOW-UP APPOINTMENT:  A follow-up " appointment should be arranged with your doctor in office will call to schedule.    You should CALL YOUR PHYSICIAN if you develop:  Fever greater than 101 degrees F.  Pain not relieved by medication, or persistent nausea or vomiting.  Excessive bleeding (blood soaking through dressing) or unexpected drainage from the wound.  Extreme redness or swelling around the incision site, drainage of pus or foul smelling drainage.  Inability to urinate or empty your bladder within 8 hours.  Problems with breathing or chest pain.    You should call 911 if you develop problems with breathing or chest pain.  If you are unable to contact your doctor or surgical center, you should go to the nearest emergency room or urgent care center.  Physician's telephone #: Dr. Vargas [phone]    If any questions arise, call your doctor.  If your doctor is not available, please feel free to call the Surgical Center at [phone].  The Center is open Monday through Friday from 7AM to 7PM.  You can also call the HEALTH HOTLINE open 24 hours/day, 7 days/week and speak to a nurse at [phone], or toll free at [phone].    A registered nurse may call you a few days after your surgery to see how you are doing after your procedure.    MEDICATIONS: Resume taking daily medication.  Take prescribed pain medication with food.  If no medication is prescribed, you may take non-aspirin pain medication if needed.  PAIN MEDICATION CAN BE VERY CONSTIPATING.  Take a stool softener or laxative such as senokot, pericolace, or milk of magnesia if needed.    Prescription given for none.  Last pain medication given at none.    If your physician has prescribed pain medication that includes Acetaminophen (Tylenol), do not take additional Acetaminophen (Tylenol) while taking the prescribed medication.    Depression / Suicide Risk    As you are discharged from this [facility] facility, it is important to learn how to keep safe from harming  yourself.    Recognize the warning signs:  · Abrupt changes in personality, positive or negative- including increase in energy   · Giving away possessions  · Change in eating patterns- significant weight changes-  positive or negative  · Change in sleeping patterns- unable to sleep or sleeping all the time   · Unwillingness or inability to communicate  · Depression  · Unusual sadness, discouragement and loneliness  · Talk of wanting to die  · Neglect of personal appearance   · Rebelliousness- reckless behavior  · Withdrawal from people/activities they love  · Confusion- inability to concentrate     If you or a loved one observes any of these behaviors or has concerns about self-harm, here's what you can do:  · Talk about it- your feelings and reasons for harming yourself  · Remove any means that you might use to hurt yourself (examples: pills, rope, extension cords, firearm)  · Get professional help from the community (Mental Health, Substance Abuse, psychological counseling)  · Do not be alone:Call your Safe Contact- someone whom you trust who will be there for you.  · Call your local CRISIS HOTLINE 859-5467 or 203-752-1532  · Call your local Children's Mobile Crisis Response Team [place] (050) 137-7525 or www.[employer]  · Call the toll free National Suicide Prevention Hotlines   · National Suicide Prevention Lifeline 993-687-ZXCB (4743)  · National Hope Line Network 800-SUICIDE (463-8640)       Medication List      CONTINUE taking these medications        Instructions    albuterol 108 (90 BASE) MCG/ACT Aers inhalation aerosol    Inhale 2 Puffs by mouth every 6 hours as needed for Shortness of Breath.   Dose:  2 Puff       cefpodoxime 100 MG tablet   Commonly known as:  VANTIN    Take 2 Tabs by mouth 2 times a day for 10 days.   Dose:  200 mg       clindamycin 300 MG Caps   Commonly known as:  CLEOCIN    Take 1 Cap by mouth 3 times a day.   Dose:  300 mg       EQL COQ10 300 MG Caps   Generic drug:   Coenzyme Q10    Take 1 Cap by mouth every day at 6 PM.   Dose:  1 Cap       GRALISE 600 MG Tabs   Generic drug:  Gabapentin (Once-Daily)    Take 1 Tab by mouth 3 times a day. Pt reports she is taking 1 tab in a.m. and 2 tabs in p.m.   Dose:  1 Tab       HM VITAMIN B12 500 MCG tablet   Generic drug:  cyanocobalamin    Take 1,000 mcg by mouth 2 times a day.   Dose:  1000 mcg       ivabradine 5 MG Tabs tablet   Commonly known as:  CORLANOR    Take 1 Tab by mouth 2 Times a Day.   Dose:  1 Tab       lactulose 10 GM/15ML Soln    Take 15 mL by mouth 2 times a day as needed.   Dose:  10 g       levothyroxine 75 MCG Tabs   Commonly known as:  SYNTHROID    Take 75 mcg by mouth Every morning on an empty stomach.   Dose:  75 mcg       Melatonin 5 MG Tabs    Take 10 mg by mouth at bedtime as needed (sleep aid).   Dose:  10 mg       naproxen 500 MG Tabs   Commonly known as:  NAPROSYN    Take 500 mg by mouth 2 times a day as needed (pain).   Dose:  500 mg       omeprazole 20 MG delayed-release capsule   Commonly known as:  PRILOSEC    Take 20 mg by mouth 2 times a day. takes 1x daily most of time   Dose:  20 mg       oxycodone-acetaminophen  MG Tabs   Commonly known as:  PERCOCET-10    Take 1-2 Tabs by mouth 2 Times a Day. Two tabs   Dose:  1-2 Tab       promethazine 25 MG Tabs   Commonly known as:  PHENERGAN    Take 1 Tab by mouth every 6 hours as needed for Nausea/Vomiting.   Dose:  25 mg       ranitidine 150 MG Tabs   Commonly known as:  ZANTAC    Take 150 mg by mouth 2 times a day.   Dose:  150 mg       sodium bicarbonate 325 MG Tabs    Take 2 Tabs by mouth 3 times a day.   Dose:  650 mg       Vitamin D3 43397 UNITS Caps    Take 1 Each by mouth every 7 days.   Dose:  1 Each       Zinc Oxide 11.3 % Crea    Apply 1 Application to affected area(s) 1 time daily as needed (for skin irritations to coccyx).   Dose:  1 Application       ziprasidone 80 MG Caps   Commonly known as:  GEODON    Take 160 mg by mouth every evening.    Dose:  160 mg               Medication Information     Above and/or attached are the medications your physician expects you to take upon discharge. Review all of your home medications and newly ordered medications with your doctor and/or pharmacist. Follow medication instructions as directed by your doctor and/or pharmacist. Please keep your medication list with you and share with your physician. Update the information when medications are discontinued, doses are changed, or new medications (including over-the-counter products) are added; and carry medication information at all times in the event of emergency situations.        Resources     Quit Smoking / Tobacco Use:    I understand the use of any tobacco products increases my chance of suffering from future heart disease or stroke and could cause other illnesses which may shorten my life. Quitting the use of tobacco products is the single most important thing I can do to improve my health. For further information on smoking / tobacco cessation call a Toll Free Quit Line at 1-495.958.1732 (*National Cancer [place]) or 1-104.632.6268 (American Lung Association) or you can access the web based program at www.[employer].org.    [place] Tobacco Users Help Line:  (986) 104-5236       Toll Free: 1-947.290.2059  Quit Tobacco Program [facility] (273)636-9062    Crisis Hotline:    [place] Crisis Hotline:  9-923-IXQFGIZ or 1-582.914.2620    [place] Crisis Hotline:    1-233.376.5822 or 557-660-3078    Discharge Survey:   Thank you for choosing [facility]. We hope we did everything we could to make your hospital stay a pleasant one. You may be receiving a survey and we would appreciate your time and participation in answering the questions. Your input is very valuable to us in our efforts to improve our service to our patients and their families.            Signatures     My signature on this form indicates that:    1. I acknowledge receipt and  understanding of these Home Care Instruction.  2. My questions regarding this information have been answered to my satisfaction.  3. I have formulated a plan with my discharge nurse to obtain my prescribed medications for home.    __________________________________      __________________________________                   Patient Signature                                 Guardian/Responsible Adult Signature      __________________________________                 __________       ________                       Nurse Signature                                               Date                 Time

## 2017-01-04 NOTE — OR NURSING
0941 received from pacu  Up to chair without difficulty  abd soft  No c/o pain  Andrade intact to downdrain  1000 meets discharge criteria

## 2017-01-04 NOTE — OR NURSING
844- To PACU from OR, Report Received, Pt sleeping, respirations spontaneous and non-labored via N/C.  Pt awake, follows commands, denies pain.  0900- taking small sips of water without difficulty. Pt requests apple juice, carton consumed.   915- VSS, fully awake, denies pain/nausea.  Meets D/C criteria.  931-transfer to stage 2, report to Priscila HASSAN.

## 2017-01-04 NOTE — IP AVS SNAPSHOT
1/4/2017          Kristin Balderrama  [address] [place] [address]    Dear Kristin:    [facility] wants to ensure your discharge home is safe and you or your loved ones have had all your questions answered regarding your care after you leave the hospital.    You may receive a telephone call within two days of your discharge.  This call is to make certain you understand your discharge instructions as well as ensure we provided you with the best care possible during your stay with us.     The call will only last approximately 3-5 minutes and will be done by a nurse.    Once again, we want to ensure your discharge home is safe and that you have a clear understanding of any next steps in your care.  If you have any questions or concerns, please do not hesitate to contact us, we are here for you.  Thank you for choosing [facility] for your healthcare needs.    Sincerely,    Lorenzo Zuleta    [facility]

## 2017-01-04 NOTE — DISCHARGE INSTRUCTIONS
ACTIVITY: Rest and take it easy for the first 24 hours.  A responsible adult is recommended to remain with you during that time.  It is normal to feel sleepy.  We encourage you to not do anything that requires balance, judgment or coordination.    MILD FLU-LIKE SYMPTOMS ARE NORMAL. YOU MAY EXPERIENCE GENERALIZED MUSCLE ACHES, THROAT IRRITATION, HEADACHE AND/OR SOME NAUSEA.    FOR 24 HOURS DO NOT:  Drive, operate machinery or run household appliances.  Drink beer or alcoholic beverages.   Make important decisions or sign legal documents.    SPECIAL INSTRUCTIONS: Resume regular diet as tolerated,       DIET: To avoid nausea, slowly advance diet as tolerated, avoiding spicy or greasy foods for the first day.  Add more substantial food to your diet according to your physician's instructions.  Babies can be fed formula or breast milk as soon as they are hungry.  INCREASE FLUIDS AND FIBER TO AVOID CONSTIPATION.    SURGICAL DRESSING/BATHING: none    FOLLOW-UP APPOINTMENT:  A follow-up appointment should be arranged with your doctor in office will call to schedule.    You should CALL YOUR PHYSICIAN if you develop:  Fever greater than 101 degrees F.  Pain not relieved by medication, or persistent nausea or vomiting.  Excessive bleeding (blood soaking through dressing) or unexpected drainage from the wound.  Extreme redness or swelling around the incision site, drainage of pus or foul smelling drainage.  Inability to urinate or empty your bladder within 8 hours.  Problems with breathing or chest pain.    You should call 911 if you develop problems with breathing or chest pain.  If you are unable to contact your doctor or surgical center, you should go to the nearest emergency room or urgent care center.  Physician's telephone #: Dr. Vargas [phone]    If any questions arise, call your doctor.  If your doctor is not available, please feel free to call the Surgical Center at [phone].  The Center is open Monday through  Friday from 7AM to 7PM.  You can also call the HEALTH HOTLINE open 24 hours/day, 7 days/week and speak to a nurse at [phone], or toll free at [phone].    A registered nurse may call you a few days after your surgery to see how you are doing after your procedure.    MEDICATIONS: Resume taking daily medication.  Take prescribed pain medication with food.  If no medication is prescribed, you may take non-aspirin pain medication if needed.  PAIN MEDICATION CAN BE VERY CONSTIPATING.  Take a stool softener or laxative such as senokot, pericolace, or milk of magnesia if needed.    Prescription given for none.  Last pain medication given at none.    If your physician has prescribed pain medication that includes Acetaminophen (Tylenol), do not take additional Acetaminophen (Tylenol) while taking the prescribed medication.    Depression / Suicide Risk    As you are discharged from this [facility] facility, it is important to learn how to keep safe from harming yourself.    Recognize the warning signs:  · Abrupt changes in personality, positive or negative- including increase in energy   · Giving away possessions  · Change in eating patterns- significant weight changes-  positive or negative  · Change in sleeping patterns- unable to sleep or sleeping all the time   · Unwillingness or inability to communicate  · Depression  · Unusual sadness, discouragement and loneliness  · Talk of wanting to die  · Neglect of personal appearance   · Rebelliousness- reckless behavior  · Withdrawal from people/activities they love  · Confusion- inability to concentrate     If you or a loved one observes any of these behaviors or has concerns about self-harm, here's what you can do:  · Talk about it- your feelings and reasons for harming yourself  · Remove any means that you might use to hurt yourself (examples: pills, rope, extension cords, firearm)  · Get professional help from the community (Mental Health, Substance Abuse,  psychological counseling)  · Do not be alone:Call your Safe Contact- someone whom you trust who will be there for you.  · Call your local CRISIS HOTLINE 169-0369 or 950-282-0141  · Call your local Children's Mobile Crisis Response Team [place] (364) 250-2847 or www.[employer]  · Call the toll free National Suicide Prevention Hotlines   · National Suicide Prevention Lifeline 694-595-MSEI (7356)  · National Hope Line Network 800-SUICIDE (909-2723)

## 2017-01-05 VITALS
SYSTOLIC BLOOD PRESSURE: 106 MMHG | RESPIRATION RATE: 24 BRPM | DIASTOLIC BLOOD PRESSURE: 60 MMHG | TEMPERATURE: 97.6 F | HEART RATE: 96 BPM

## 2017-01-05 RX ORDER — FLUOXETINE 10 MG/1
10 CAPSULE ORAL DAILY
Qty: 30 CAP | Refills: 1 | Status: SHIPPED | OUTPATIENT
Start: 2017-01-05 | End: 2017-04-05 | Stop reason: SDUPTHER

## 2017-01-05 SDOH — ECONOMIC STABILITY: HOUSING INSECURITY: UNSAFE APPLIANCES: 0

## 2017-01-05 SDOH — ECONOMIC STABILITY: HOUSING INSECURITY: UNSAFE COOKING RANGE AREA: 0

## 2017-01-05 ASSESSMENT — ACTIVITIES OF DAILY LIVING (ADL)
HOME_HEALTH_OASIS: 01
OASIS_M1830: 01

## 2017-01-05 ASSESSMENT — ENCOUNTER SYMPTOMS: DEPRESSED MOOD: 1

## 2017-01-05 NOTE — PROCEDURES
DATE OF SERVICE:  01/04/2017    PROCEDURE:  Esophagogastroduodenoscopy with dilation of the esophagus.    SURGEON:  Raghu Vargas MD    ANESTHESIA:  See anesthesiologist's note.    INDICATIONS FOR PROCEDURE:  Dysphagia.    DESCRIPTION OF PROCEDURE:  Informed consent was obtained.  The patient placed   in left lateral recumbency.  Endoscope was advanced per mouth through the   esophagus and stomach to the level of the second portion of the duodenum   revealing no abnormalities.  Mucosa inspected as the instrument was withdrawn.    Random esophageal biopsies were obtained.  A Savary guidewire was then   advanced throughout the antrum and a 45-Macedonian Savary dilator was advanced   over the guidewire without resistance.  The endoscope was reinserted through   the stomach to the level of cardia revealing no change.  Endoscope was then   withdrawn.  The patient tolerated the procedure well without apparent   complication.    IMPRESSION:  1.  Normal upper endoscopy.  2.  Random esophageal biopsies and empiric esophageal dilation performed.    There was no change in the esophagus following dilation.    PLAN:  The patient will be scheduled for esophageal motility studies.       ____________________________________     MD ISMAEL GONZALEZ / CHRISTELLE    DD:  01/04/2017 08:40:55  DT:  01/04/2017 23:05:59    D#:  [id number]  Job#:  [id number]    cc: RAGHU ALEX MD

## 2017-01-10 ENCOUNTER — OFFICE VISIT (OUTPATIENT)
Dept: MEDICAL GROUP | Facility: MEDICAL CENTER | Age: 27
End: 2017-01-10
Payer: MEDICARE

## 2017-01-10 ENCOUNTER — HOSPITAL ENCOUNTER (OUTPATIENT)
Dept: LAB | Facility: MEDICAL CENTER | Age: 27
End: 2017-01-10
Attending: INTERNAL MEDICINE
Payer: MEDICARE

## 2017-01-10 VITALS
TEMPERATURE: 98.8 F | BODY MASS INDEX: 49.5 KG/M2 | HEIGHT: 62 IN | HEART RATE: 110 BPM | WEIGHT: 269 LBS | DIASTOLIC BLOOD PRESSURE: 76 MMHG | SYSTOLIC BLOOD PRESSURE: 122 MMHG | OXYGEN SATURATION: 96 % | RESPIRATION RATE: 16 BRPM

## 2017-01-10 DIAGNOSIS — R30.0 DYSURIA: ICD-10-CM

## 2017-01-10 DIAGNOSIS — H92.03 OTALGIA, BILATERAL: ICD-10-CM

## 2017-01-10 DIAGNOSIS — R13.10 DYSPHAGIA, UNSPECIFIED TYPE: ICD-10-CM

## 2017-01-10 LAB
APPEARANCE UR: ABNORMAL
BACTERIA #/AREA URNS HPF: ABNORMAL /HPF
BILIRUB UR QL STRIP.AUTO: NEGATIVE
COLOR UR AUTO: YELLOW
CULTURE IF INDICATED INDCX: YES UA CULTURE
GLUCOSE UR STRIP.AUTO-MCNC: NEGATIVE MG/DL
KETONES UR STRIP.AUTO-MCNC: NEGATIVE MG/DL
LEUKOCYTE ESTERASE UR QL STRIP.AUTO: ABNORMAL
MICRO URNS: ABNORMAL
MUCOUS THREADS URNS QL MICRO: ABNORMAL /HPF
NITRITE UR QL STRIP.AUTO: NEGATIVE
PH UR: 6.5 [PH]
PROT UR QL STRIP: 70 MG/DL
RBC #/AREA URNS HPF: ABNORMAL /HPF
RBC UR QL AUTO: ABNORMAL
SP GR UR STRIP.AUTO: 1.03
WBC #/AREA URNS HPF: ABNORMAL /HPF

## 2017-01-10 PROCEDURE — 87086 URINE CULTURE/COLONY COUNT: CPT

## 2017-01-10 PROCEDURE — 87077 CULTURE AEROBIC IDENTIFY: CPT

## 2017-01-10 PROCEDURE — 99214 OFFICE O/P EST MOD 30 MIN: CPT | Performed by: INTERNAL MEDICINE

## 2017-01-10 PROCEDURE — 81001 URINALYSIS AUTO W/SCOPE: CPT

## 2017-01-10 PROCEDURE — 87186 SC STD MICRODIL/AGAR DIL: CPT

## 2017-01-10 RX ORDER — CYCLOBENZAPRINE HCL 10 MG
10 TABLET ORAL 2 TIMES DAILY
COMMUNITY
End: 2017-04-12

## 2017-01-10 RX ORDER — OXYBUTYNIN CHLORIDE 10 MG/1
10 TABLET, EXTENDED RELEASE ORAL 2 TIMES DAILY
COMMUNITY
End: 2017-02-22

## 2017-01-10 RX ORDER — UBIDECARENONE 75 MG
100 CAPSULE ORAL DAILY
COMMUNITY
End: 2017-01-22

## 2017-01-10 ASSESSMENT — PATIENT HEALTH QUESTIONNAIRE - PHQ9: CLINICAL INTERPRETATION OF PHQ2 SCORE: 0

## 2017-01-10 NOTE — PROGRESS NOTES
CC: Follow-up multiple issues    HPI:   Kristin presents today with the following.    1. Dysphagia, unspecified type  Presents with persistent dysphagia. She recently underwent EGD with dilation reports no improvement in her swallowing. 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     2. Otalgia, bilateral  She is complaining of persistent earache right greater than left. She was placed on anti-biotics 2 weeks ago. She reports her symptoms have improved over the last 2 days but she still having fullness in the right ear. Still persistent sinus congestion but no cough or shortness of breath.     3. Dysuria  She is on multiple antibodies including clindamycin from another physician. She reports last 3 days she's had pain with urination. She is going to urology tomorrow for a suprapubic catheter change. She denies any flank pain but reports mild burning as well as increased frequency.      Patient Active Problem List    Diagnosis Date Noted   • Quadriparesis ([facility]) 11/01/2016     Priority: High   • Paralysis ([facility]) 10/27/2016     Priority: High   • Sinus tachycardia 10/31/2013     Priority: High   • HTN (hypertension) 11/01/2016     Priority: Medium   • Chronic pain syndrome 10/27/2016     Priority: Medium   • Dysphagia 10/27/2016     Priority: Medium   • Depression 10/28/2016     Priority: Low   • Schizophrenia ([facility]) 10/27/2016     Priority: Low   • Suprapubic catheter ([facility]) 10/27/2016     Priority: Low   • Psychosis, schizophrenia, simple ([facility]) 09/29/2016     Priority: Low     Class: Chronic   • Hypothyroidism 11/23/2015     Priority: Low   • PCOS (polycystic ovarian syndrome) 11/23/2015     Priority: Low   • Chronic back pain 06/29/2015     Priority: Low   • Progressive focal motor weakness 06/28/2015     Priority: Low   • Fatty liver disease, nonalcoholic 01/19/2015     Priority: Low   • Anxiety 12/16/2014     Priority:  Low   • Knee pain, right 02/13/2014     Priority: Low   • Neurogenic bladder 04/02/2011     Priority: Low   • Chronic UTI 09/18/2010     Priority: Low   • Multiple personality disorder 09/18/2010     Priority: Low   • Leg weakness 01/04/2017   • Hypovitaminosis D 11/29/2016   • Bowel and bladder incontinence 10/27/2016   • Galactorrhea 07/22/2016   • Elevated sedimentation rate 06/27/2016   • Weakness of both lower extremities 06/22/2016   • Right flank pain 06/06/2016   • Chronic inflammatory arthritis 05/23/2016   • Vitamin D deficiency 05/21/2016   • TONYA on CPAP 03/07/2016   • Morbidly obese (HCC) 03/07/2016   • Conversion disorder 03/07/2016   • Scoliosis 03/07/2016   • GERD (gastroesophageal reflux disease) 03/07/2016   • Peripheral neuropathy ([facility]) 03/06/2016   • H/O prior ablation treatment 02/10/2016       Current Outpatient Prescriptions   Medication Sig Dispense Refill   • fluoxetine (PROZAC) 10 MG Cap Take 1 Cap by mouth every day. 30 Cap 1   • oxycodone-acetaminophen (PERCOCET-10)  MG Tab Take 1-2 Tabs by mouth 2 Times a Day. Two tabs     • promethazine (PHENERGAN) 25 MG Tab Take 1 Tab by mouth every 6 hours as needed for Nausea/Vomiting. 30 Tab 0   • clindamycin (CLEOCIN) 300 MG Cap Take 1 Cap by mouth 3 times a day. 30 Cap 0   • Cholecalciferol (VITAMIN D3) 45677 UNITS Cap Take 1 Each by mouth every 7 days. 4 Cap 4   • naproxen (NAPROSYN) 500 MG Tab Take 500 mg by mouth 2 times a day as needed (pain).     • lactulose 10 GM/15ML Solution Take 15 mL by mouth 2 times a day as needed. 1 Bottle 3   • albuterol 108 (90 BASE) MCG/ACT Aero Soln inhalation aerosol Inhale 2 Puffs by mouth every 6 hours as needed for Shortness of Breath.     • Coenzyme Q10 (EQL COQ10) 300 MG Cap Take 1 Cap by mouth every day at 6 PM.     • cyanocobalamin (HM VITAMIN B12) 500 MCG tablet Take 1,000 mcg by mouth 2 times a day.     • Melatonin 5 MG Tab Take 10 mg by mouth at bedtime as needed (sleep aid).     • Zinc Oxide 11.3  "% Cream Apply 1 Application to affected area(s) 1 time daily as needed (for skin irritations to coccyx).     • sodium bicarbonate 325 MG Tab Take 2 Tabs by mouth 3 times a day.     • ziprasidone (GEODON) 80 MG Cap Take 160 mg by mouth every evening.     • levothyroxine (SYNTHROID) 75 MCG Tab Take 75 mcg by mouth Every morning on an empty stomach.     • Ivabradine HCl (CORLANOR) 5 MG Tab Take 1 Tab by mouth 2 Times a Day. 60 Tab 6   • Gabapentin, Once-Daily, (GRALISE) 600 MG Tab Take 1 Tab by mouth 3 times a day. Pt reports she is taking 1 tab in a.m. and 2 tabs in p.m.     • omeprazole (PRILOSEC) 20 MG delayed-release capsule Take 20 mg by mouth 2 times a day. takes 1x daily most of time     • ranitidine (ZANTAC) 150 MG Tab Take 150 mg by mouth 2 times a day.       No current facility-administered medications for this visit.         Allergies as of 01/10/2017 - Joe as Reviewed 01/10/2017   Allergen Reaction Noted   • Cefdinir Shortness of Breath and Itching 03/01/2016   • Depakote [divalproex sodium] Unspecified 06/14/2010   • Abilify Unspecified 01/17/2013   • Amitriptyline Unspecified 10/31/2013   • Amoxicillin Rash 09/18/2010   • Ciprofloxacin Rash 12/17/2009   • Clindamycin Nausea 02/02/2011   • Doxycycline Vomiting and Nausea 08/15/2012   • Ees [erythromycin] Vomiting and Nausea 08/28/2010   • Flagyl [metronidazole hcl] Unspecified 03/31/2011   • Flomax [tamsulosin hydrochloride] Swelling 09/24/2009   • Metformin Unspecified 07/23/2013   • Sulfa drugs Hives and Rash 09/18/2010   • Tape Rash 08/15/2012   • Vancomycin Itching 07/10/2016   • Cephalexin [keflex] Rash 01/01/2017   • Levofloxacin Unspecified 10/27/2016        ROS: As per HPI.    /76 mmHg  Pulse 110  Temp(Src) 37.1 °C (98.8 °F)  Resp 16  Ht 1.575 m (5' 2\")  Wt 122.018 kg (269 lb)  BMI 49.19 kg/m2  SpO2 96%    Physical Exam:  Gen:         Alert and oriented, No apparent distress.  Heent:       TMs clear bilaterally, oropharynx without " erythema or exudates  Neck:        No Lymphadenopathy or Bruits.  Lungs:     Clear to auscultation bilaterally  CV:          Regular rate and rhythm. No murmurs, rubs or gallops.               Ext:          No clubbing, cyanosis, edema.      Assessment and Plan.   26 y.o. female with the following issues.    1. Dysphagia, unspecified type  We'll follow along with gastroenterology as well as neurology who exactly transit of muscle biopsy.    2. Otalgia, bilateral  Ears do not look as if there is active infection have recommended Afrin over-the-counter next 3 days.    3. Dysuria  Absent urinalysis she will follow up with urology tomorrow.  - URINALYSIS,CULTURE IF INDICATED; Future

## 2017-01-10 NOTE — MR AVS SNAPSHOT
"        Kristin Armstrong Balderrama   1/10/2017 7:40 AM   Office Visit   MRN: [id number]    Department:  [address] [place] [address]   Dept Phone:  [phone]    Description:  Female : 1990   Provider:  Torres Brody M.D.           Reason for Visit     Otalgia Right ear      Allergies as of 1/10/2017     Allergen Noted Reactions    Cefdinir 2016   Shortness of Breath, Itching    RXN=unknown    Depakote [Divalproex Sodium] 2010   Unspecified    Muscle spasms/muscle pain and weakness  RXN=unknown    Abilify 2013   Unspecified    Headaches/muscle twitching  RXN=unknown    Amitriptyline 10/31/2013   Unspecified    Headaches  RXN=unknown    Amoxicillin 2010   Rash    RXN=unknown    Ciprofloxacin 2009   Rash    RXN=unknown    Clindamycin 2011   Nausea    Even with food  RXN=unkown    Doxycycline 08/15/2012   Vomiting, Nausea    RXN=unknown    Ees [Erythromycin] 2010   Vomiting, Nausea    RXN=unknown    Flagyl [Metronidazole Hcl] 2011   Unspecified    \"eye problems\"  RXN=uknown    Flomax [Tamsulosin Hydrochloride] 2009   Swelling    RXN=unknown    Metformin 2013   Unspecified    Increased lactic acid   RXN=unknown    Sulfa Drugs 2010   Hives, Rash    RXN=since childhood    Tape 08/15/2012   Rash    Tears skin off   coban with Tegaderm tape ok  RXN=ongoing    Vancomycin 07/10/2016   Itching    Pt becomes flushed in face and chest.   RXN=7/10/16    Cephalexin [Keflex] 2017   Rash    Pt states she gets a rash with this medication    Levofloxacin 10/27/2016   Unspecified    Leg muscle cramps      You were diagnosed with     Dysphagia, unspecified type   [3255883]       Otalgia, bilateral   [252673]       Dysuria   [788.1.ICD-9-CM]         Vital Signs     Blood Pressure Pulse Temperature Respirations Height Weight    122/76 mmHg 110 37.1 °C (98.8 °F) 16 1.575 m (5' 2\") 122.018 kg (269 lb)    Body Mass Index Oxygen Saturation Smoking Status             49.19 " kg/m2 96% Passive Smoke Exposure - Never Smoker         Basic Information     Date Of Birth Sex Race Ethnicity Preferred Language    1990 Female White Non- English      Your appointments     Fredrick 10, 2017  7:40 AM   Established Patient with Torres Brody M.D.   [facility] [address]    [address]  [address]  [place] [address]   [phone]           You will be receiving a confirmation call a few days before your appointment from our automated call confirmation system.            Jan 31, 2017  9:00 AM   Follow Up Med Management with Ronny Burnette M.D.   [facility] [address] [place] [address])    [address]  [address]  [place] [address]   [phone]            Mar 01, 2017  9:00 AM   Individual Therapy with Blanca Brantley L.C.S.W.   [facility]    [address]  [address]  [place] [address] [phone]            Mar 15, 2017  9:00 AM   Individual Therapy with Blanca Brantley L.C.S.W.   [facility]    [address]  [address]  [place] [address] [phone]            Mar 29, 2017  9:00 AM   Individual Therapy with Blanca Brantley L.C.S.W.   [facility]    [address]  [address]  [place] [address] [phone]            Apr 12, 2017  9:00 AM   Individual Therapy with Blanca Brantley L.C.S.W.   [facility]    [address]  [address]  [place] [address] [phone]            May 23, 2017  9:40 AM   FOLLOW UP with Torres Kumar M.D.   [facility] (--)    [address]  [place] [id number] [phone]              Problem List              ICD-10-CM Priority Class Noted - Resolved    Chronic UTI N39.0 Low  9/18/2010 - Present    Multiple personality disorder F44.81 Low  9/18/2010 - Present    Neurogenic bladder N31.9 Low  4/2/2011 - Present    Sinus tachycardia R00.0 High  10/31/2013 - Present    Knee pain,  right M25.561 Low  2/13/2014 - Present    Anxiety F41.9 Low  12/16/2014 - Present    Fatty liver disease, nonalcoholic K76.0 Low  1/19/2015 - Present    Progressive focal motor weakness M62.81 Low  6/28/2015 - Present    Chronic back pain M54.9, G89.29 Low  6/29/2015 - Present    Hypothyroidism E03.9 Low  11/23/2015 - Present    PCOS (polycystic ovarian syndrome) E28.2 Low  11/23/2015 - Present    H/O prior ablation treatment Z98.890   2/10/2016 - Present    Peripheral neuropathy (HCC) G62.9   3/6/2016 - Present    TONYA on CPAP G47.33   3/7/2016 - Present    Morbidly obese (HCC) E66.01   3/7/2016 - Present    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   3/7/2016 - Present    Scoliosis M41.9   3/7/2016 - Present    GERD (gastroesophageal reflux disease) K21.9   3/7/2016 - Present    Vitamin D deficiency E55.9   5/21/2016 - Present    Chronic inflammatory arthritis M19.90   5/23/2016 - Present    Right flank pain R10.9   6/6/2016 - Present    Weakness of both lower extremities M62.81   6/22/2016 - Present    Elevated sedimentation rate R70.0   6/27/2016 - Present    Galactorrhea O92.6   7/22/2016 - Present    Psychosis, schizophrenia, simple (HCC) (Chronic) F20.89 Low Chronic 9/29/2016 - Present    Schizophrenia (HCC) F20.9 Low  10/27/2016 - Present    Paralysis (HCC) G83.9 High  10/27/2016 - Present    Chronic pain syndrome G89.4 Medium  10/27/2016 - Present    Suprapubic catheter (HCC) Z93.59 Low  10/27/2016 - Present    Bowel and bladder incontinence R32, R15.9   10/27/2016 - Present    Dysphagia R13.10 Medium  10/27/2016 - Present    Depression F32.9 Low  10/28/2016 - Present    HTN (hypertension) I10 Medium  11/1/2016 - Present    Quadriparesis (HCC) G82.50 High  11/1/2016 - Present    Hypovitaminosis D E55.9   11/29/2016 - Present    Leg weakness M62.81   1/4/2017 - Present      Health Maintenance        Date Due Completion Dates    IMM HEP A VACCINE (1 of 2 - Standard Series) 10/13/1990 ---    IMM VARICELLA (CHICKENPOX) VACCINE  "(1 of 2 - 2 Dose Adolescent Series) 10/13/2002 ---    PAP SMEAR 7/22/2019 7/22/2016 (Postponed), 2/19/2013 (N/S)    Override on 7/22/2016: Postponed (per pt was told could \"skip\" 2016)    Override on 2/19/2013: (N/S) ([place])    IMM DTaP/Tdap/Td Vaccine (7 - Td) 8/6/2022 8/6/2012, 9/18/2010, 3/3/1994, 5/17/1991, 5/10/1990, 3/23/1990, 1990    COLONOSCOPY 9/25/2023 9/25/2013            Current Immunizations     Dtap Vaccine 3/3/1994, 5/17/1991, 5/10/1990, 3/23/1990, 1990    HIB Vaccine(PEDVAX) 1/11/1991    HPV Quadrivalent Vaccine (GARDASIL) 10/27/2011, 5/27/2011, 3/1/2011    Hepatitis B Vaccine Non-Recombivax (Ped/Adol) 1/28/2004, 10/28/2003, 10/29/1999    Influenza TIV (IM) 9/5/2013, 12/7/2011, 11/7/2011    Influenza Vaccine Adult HD 10/5/2016    MMR Vaccine 3/3/1994, 1/11/1991    OPV - Historical Data 3/3/1994, 5/17/1991, 5/10/1990, 3/23/1990, 1989    Pneumococcal Vaccine (PCV7) Historical Data 11/8/2015    TD Vaccine 9/18/2010  4:45 PM    Tdap Vaccine 8/6/2012      Below and/or attached are the medications your provider expects you to take. Review all of your home medications and newly ordered medications with your provider and/or pharmacist. Follow medication instructions as directed by your provider and/or pharmacist. Please keep your medication list with you and share with your provider. Update the information when medications are discontinued, doses are changed, or new medications (including over-the-counter products) are added; and carry medication information at all times in the event of emergency situations     Allergies:  CEFDINIR - Shortness of Breath,Itching     DEPAKOTE - Unspecified     ABILIFY - Unspecified     AMITRIPTYLINE - Unspecified     AMOXICILLIN - Rash     CIPROFLOXACIN - Rash     CLINDAMYCIN - Nausea     DOXYCYCLINE - Vomiting,Nausea     EES - Vomiting,Nausea     FLAGYL - Unspecified     FLOMAX - Swelling     METFORMIN - Unspecified     SULFA DRUGS - Hives,Rash     TAPE - " Rash     VANCOMYCIN - Itching     CEPHALEXIN - Rash     LEVOFLOXACIN - Unspecified               Medications  Valid as of: January 10, 2017 -  7:35 AM    Generic Name Brand Name Tablet Size Instructions for use    Albuterol Sulfate (Aero Soln) albuterol 108 (90 BASE) MCG/ACT Inhale 2 Puffs by mouth every 6 hours as needed for Shortness of Breath.        Cholecalciferol (Cap) Vitamin D3 54725 UNITS Take 1 Each by mouth every 7 days.        Clindamycin HCl (Cap) CLEOCIN 300 MG Take 1 Cap by mouth 3 times a day.        Coenzyme Q10 (Cap) Coenzyme Q10 300 MG Take 1 Cap by mouth every day at 6 PM.        Cyanocobalamin (Tab) vitamin b12 500 MCG Take 1,000 mcg by mouth 2 times a day.        FLUoxetine HCl (Cap) PROZAC 10 MG Take 1 Cap by mouth every day.        Gabapentin (Once-Daily) (Tab) Gabapentin (Once-Daily) 600 MG Take 1 Tab by mouth 3 times a day. Pt reports she is taking 1 tab in a.m. and 2 tabs in p.m.        Ivabradine HCl (Tab) CORLANOR 5 MG Take 1 Tab by mouth 2 Times a Day.        Lactulose (Solution) lactulose 10 GM/15ML Take 15 mL by mouth 2 times a day as needed.        Levothyroxine Sodium (Tab) SYNTHROID 75 MCG Take 75 mcg by mouth Every morning on an empty stomach.        Melatonin (Tab) Melatonin 5 MG Take 10 mg by mouth at bedtime as needed (sleep aid).        Naproxen (Tab) NAPROSYN 500 MG Take 500 mg by mouth 2 times a day as needed (pain).        Omeprazole (CAPSULE DELAYED RELEASE) PRILOSEC 20 MG Take 20 mg by mouth 2 times a day. takes 1x daily most of time        Oxycodone-Acetaminophen (Tab) PERCOCET-10  MG Take 1-2 Tabs by mouth 2 Times a Day. Two tabs        Promethazine HCl (Tab) PHENERGAN 25 MG Take 1 Tab by mouth every 6 hours as needed for Nausea/Vomiting.        RaNITidine HCl (Tab) ZANTAC 150 MG Take 150 mg by mouth 2 times a day.        Sodium Bicarbonate (Tab) sodium bicarbonate 325 MG Take 2 Tabs by mouth 3 times a day.        Zinc Oxide (Cream) Zinc Oxide 11.3 % Apply 1  Application to affected area(s) 1 time daily as needed (for skin irritations to coccyx).        Ziprasidone HCl (Cap) GEODON 80 MG Take 160 mg by mouth every evening.        .                 Medicines prescribed today were sent to:     [id number] - [place], [place] - [address]    [address] [place] [address]    Phone: [phone] Fax: [phone]    Open 24 Hours?: No      Medication refill instructions:       If your prescription bottle indicates you have medication refills left, it is not necessary to call your provider’s office. Please contact your pharmacy and they will refill your medication.    If your prescription bottle indicates you do not have any refills left, you may request refills at any time through one of the following ways: The online [employer] [place] system (except Urgent Care), by calling your provider’s office, or by asking your pharmacy to contact your provider’s office with a refill request. Medication refills are processed only during regular business hours and may not be available until the next business day. Your provider may request additional information or to have a follow-up visit with you prior to refilling your medication.   *Please Note: Medication refills are assigned a new Rx number when refilled electronically. Your pharmacy may indicate that no refills were authorized even though a new prescription for the same medication is available at the pharmacy. Please request the medicine by name with the pharmacy before contacting your provider for a refill.        Your To Do List     Future Labs/Procedures Complete By Expires    URINALYSIS,CULTURE IF INDICATED  As directed 1/11/2018      Other Notes About Your Plan     DME:  [facility] / ph [phone] / fax [phone]              [employer] Access Code: Activation code not generated  Current [employer] [facility] [place] Status: Active

## 2017-01-12 LAB
BACTERIA UR CULT: ABNORMAL
SIGNIFICANT IND 70042: ABNORMAL
SOURCE SOURCE: ABNORMAL

## 2017-01-13 ENCOUNTER — TELEPHONE (OUTPATIENT)
Dept: MEDICAL GROUP | Facility: MEDICAL CENTER | Age: 27
End: 2017-01-13

## 2017-01-13 RX ORDER — NITROFURANTOIN 25; 75 MG/1; MG/1
100 CAPSULE ORAL 2 TIMES DAILY
Qty: 10 CAP | Refills: 0 | Status: SHIPPED | OUTPATIENT
Start: 2017-01-13 | End: 2017-01-18

## 2017-01-13 RX ORDER — CEPHALEXIN 250 MG/1
250 CAPSULE ORAL 2 TIMES DAILY
Qty: 20 CAP | Refills: 0 | Status: SHIPPED | OUTPATIENT
Start: 2017-01-13 | End: 2017-01-18

## 2017-01-14 ENCOUNTER — OFFICE VISIT (OUTPATIENT)
Dept: URGENT CARE | Facility: CLINIC | Age: 27
End: 2017-01-14
Payer: MEDICARE

## 2017-01-14 VITALS
TEMPERATURE: 98.1 F | HEART RATE: 96 BPM | RESPIRATION RATE: 20 BRPM | SYSTOLIC BLOOD PRESSURE: 124 MMHG | OXYGEN SATURATION: 96 % | DIASTOLIC BLOOD PRESSURE: 80 MMHG

## 2017-01-14 DIAGNOSIS — N76.0 VAGINITIS AND VULVOVAGINITIS: ICD-10-CM

## 2017-01-14 DIAGNOSIS — N39.0 URINARY TRACT INFECTION, SITE UNSPECIFIED: ICD-10-CM

## 2017-01-14 DIAGNOSIS — Z96.0 URINARY CATHETER IN PLACE: ICD-10-CM

## 2017-01-14 PROCEDURE — 99214 OFFICE O/P EST MOD 30 MIN: CPT | Performed by: NURSE PRACTITIONER

## 2017-01-14 RX ORDER — FLUCONAZOLE 150 MG/1
150 TABLET ORAL DAILY
Qty: 1 TAB | Refills: 0 | Status: SHIPPED | OUTPATIENT
Start: 2017-01-14 | End: 2017-01-18

## 2017-01-14 NOTE — TELEPHONE ENCOUNTER
1. Caller Name: Kristin                      Call Back Number:[phone] (home)       2. Message: patient is having hives from the keflex and would like another medication called in.    3. Patient approves office to leave a detailed voicemail/MyChart message: N\A

## 2017-01-14 NOTE — PROGRESS NOTES
Subjective:      Kristin Balderrama is a 26 y.o. female who presents with Dysuria            HPI This is a new problem. 26 year old with burning with urination, cloudy urine and pain in abdomen. She also has yellow vaginal discharge and itching. She has extensive history of multiple medical issues. Was on clinda for breast skin infection and did not tolerate this medication (nausea).   Allergies, medications and history reviewed by me today  Patient with supra pubic catheter.    ROS  Please see HPI      Objective:     /80 mmHg  Pulse 96  Temp(Src) 36.7 °C (98.1 °F)  Resp 20  SpO2 96%     Physical Exam   Constitutional: She is oriented to person, place, and time. She appears well-developed and well-nourished. No distress.   Cardiovascular: Normal rate, regular rhythm and normal heart sounds.    No murmur heard.  Pulmonary/Chest: Effort normal and breath sounds normal. No respiratory distress.   Abdominal: Soft. Bowel sounds are normal. There is tenderness in the suprapubic area. There is no CVA tenderness.   Genitourinary:   Deferred due to infirmaty of patient.   Musculoskeletal: Normal range of motion.   Moves all 4 extremities normally   Neurological: She is alert and oriented to person, place, and time.   Skin: Skin is warm and dry.   Psychiatric: She has a normal mood and affect. Her behavior is normal. Thought content normal.   Nursing note and vitals reviewed.              Assessment/Plan:     1. Vaginitis and vulvovaginitis  fluconazole (DIFLUCAN) 150 MG tablet    clindamycin (CLEOCIN) 100 MG vaginal suppository   2. Urinary tract infection, site unspecified     3. Urinary catheter in place       PCP ordered macrobid for her yesterday for UTI.  Differential diagnosis, natural history, supportive care, and indications for immediate follow-up discussed at length.

## 2017-01-14 NOTE — MR AVS SNAPSHOT
"        Kristin Armstrong Tacos   2017 9:15 AM   Office Visit   MRN: [id number]    Department:  [facility]   Dept Phone:  [phone]    Description:  Female : 1990   Provider:  AXEL Bowers           Reason for Visit     Dysuria and yellow discharge       Allergies as of 2017     Allergen Noted Reactions    Cefdinir 2016   Shortness of Breath, Itching    RXN=unknown    Depakote [Divalproex Sodium] 2010   Unspecified    Muscle spasms/muscle pain and weakness  RXN=unknown    Abilify 2013   Unspecified    Headaches/muscle twitching  RXN=unknown    Amitriptyline 10/31/2013   Unspecified    Headaches  RXN=unknown    Amoxicillin 2010   Rash    RXN=unknown    Ciprofloxacin 2009   Rash    RXN=unknown    Clindamycin 2011   Nausea    Even with food  RXN=unkown    Doxycycline 08/15/2012   Vomiting, Nausea    RXN=unknown    Ees [Erythromycin] 2010   Vomiting, Nausea    RXN=unknown    Flagyl [Metronidazole Hcl] 2011   Unspecified    \"eye problems\"  RXN=uknown    Flomax [Tamsulosin Hydrochloride] 2009   Swelling    RXN=unknown    Metformin 2013   Unspecified    Increased lactic acid   RXN=unknown    Sulfa Drugs 2010   Hives, Rash    RXN=since childhood    Tape 08/15/2012   Rash    Tears skin off   coban with Tegaderm tape ok  RXN=ongoing    Vancomycin 07/10/2016   Itching    Pt becomes flushed in face and chest.   RXN=7/10/16    Cephalexin [Keflex] 2017   Rash    Pt states she gets a rash with this medication    Levofloxacin 10/27/2016   Unspecified    Leg muscle cramps      You were diagnosed with     Vaginitis and vulvovaginitis   [4365365]       Urinary tract infection, site unspecified   [4143084]       Urinary catheter in place   [287387]         Vital Signs     Blood Pressure Pulse Temperature Respirations Oxygen Saturation Smoking Status    124/80 mmHg 96 36.7 °C (98.1 °F) 20 96% Passive Smoke Exposure - Never Smoker   "   Basic Information     Date Of Birth Sex Race Ethnicity Preferred Language    1990 Female White Non- English      Your appointments     Jan 31, 2017  9:00 AM   Follow Up Med Management with Ronny Burnette M.D.   BEHAVIORAL HEALTH [address] [place] [address])    [address] [facility]  [address]  [place] [address]   [phone]            Mar 01, 2017  9:00 AM   Individual Therapy with Blanca Brantley L.C.S.W.   [facility] ([facility])    [address] [facility]  [address]  [place] [address] [phone]            Mar 15, 2017  9:00 AM   Individual Therapy with Blanca Brantley L.C.S.W.   [facility] ([facility])    [address] [facility]  [address]  [place] [address] [phone]            Mar 29, 2017  9:00 AM   Individual Therapy with Blanca Brantley L.C.S.W.   [facility] ([facility])    [address] [facility]  [address]  [place] [address] [phone]            Apr 12, 2017  9:00 AM   Individual Therapy with JUANITA StatonSVAISHALI   [facility] ([facility])    [address] [facility]  [address]  [place] [address] [phone]            May 23, 2017  9:40 AM   FOLLOW UP with Torres Kumar M.D.   [facility] (--)    [address] [place] [address] [address]  [place] [address]   [phone]              Problem List              ICD-10-CM Priority Class Noted - Resolved    Chronic UTI N39.0 Low  9/18/2010 - Present    Multiple personality disorder F44.81 Low  9/18/2010 - Present    Neurogenic bladder N31.9 Low  4/2/2011 - Present    Sinus tachycardia R00.0 High  10/31/2013 - Present    Knee pain, right M25.561 Low  2/13/2014 - Present    Anxiety F41.9 Low  12/16/2014 - Present    Fatty liver disease, nonalcoholic K76.0 Low  1/19/2015 - Present    Progressive focal motor weakness M62.81 Low  6/28/2015 - Present    Chronic back pain M54.9, G89.29 Low  6/29/2015 - Present    Hypothyroidism E03.9 Low  11/23/2015 - Present    PCOS (polycystic ovarian syndrome) E28.2 Low   "11/23/2015 - Present    H/O prior ablation treatment Z98.890   2/10/2016 - Present    Peripheral neuropathy (CMS-HCC) G62.9   3/6/2016 - Present    TONYA on CPAP G47.33   3/7/2016 - Present    Morbidly obese (CMS-HCC) E66.01   3/7/2016 - Present    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   3/7/2016 - Present    Scoliosis M41.9   3/7/2016 - Present    GERD (gastroesophageal reflux disease) K21.9   3/7/2016 - Present    Vitamin D deficiency E55.9   5/21/2016 - Present    Chronic inflammatory arthritis M19.90   5/23/2016 - Present    Right flank pain R10.9   6/6/2016 - Present    Weakness of both lower extremities M62.81   6/22/2016 - Present    Elevated sedimentation rate R70.0   6/27/2016 - Present    Galactorrhea O92.6   7/22/2016 - Present    Psychosis, schizophrenia, simple (HCC) (Chronic) F20.89 Low Chronic 9/29/2016 - Present    Schizophrenia (CMS-HCC) F20.9 Low  10/27/2016 - Present    Paralysis (CMS-HCC) G83.9 High  10/27/2016 - Present    Chronic pain syndrome G89.4 Medium  10/27/2016 - Present    Suprapubic catheter (CMS-HCC) Z93.59 Low  10/27/2016 - Present    Bowel and bladder incontinence R32, R15.9   10/27/2016 - Present    Dysphagia R13.10 Medium  10/27/2016 - Present    Depression F32.9 Low  10/28/2016 - Present    HTN (hypertension) I10 Medium  11/1/2016 - Present    Quadriparesis (CMS-HCC) G82.50 High  11/1/2016 - Present    Hypovitaminosis D E55.9   11/29/2016 - Present    Leg weakness M62.81   1/4/2017 - Present      Health Maintenance        Date Due Completion Dates    IMM HEP A VACCINE (1 of 2 - Standard Series) 10/13/1990 ---    IMM VARICELLA (CHICKENPOX) VACCINE (1 of 2 - 2 Dose Adolescent Series) 10/13/2002 ---    PAP SMEAR 7/22/2019 7/22/2016 (Postponed), 2/19/2013 (N/S)    Override on 7/22/2016: Postponed (per pt was told could \"skip\" 2016)    Override on 2/19/2013: (N/S) ([place])    IMM DTaP/Tdap/Td Vaccine (7 - Td) 8/6/2022 8/6/2012, 9/18/2010, 3/3/1994, 5/17/1991, 5/10/1990, 3/23/1990, 1990    " COLONOSCOPY 9/25/2023 9/25/2013            Current Immunizations     Dtap Vaccine 3/3/1994, 5/17/1991, 5/10/1990, 3/23/1990, 1990    HIB Vaccine(PEDVAX) 1/11/1991    HPV Quadrivalent Vaccine (GARDASIL) 10/27/2011, 5/27/2011, 3/1/2011    Hepatitis B Vaccine Non-Recombivax (Ped/Adol) 1/28/2004, 10/28/2003, 10/29/1999    Influenza TIV (IM) 9/5/2013, 12/7/2011, 11/7/2011    Influenza Vaccine Adult HD 10/5/2016    MMR Vaccine 3/3/1994, 1/11/1991    OPV - Historical Data 3/3/1994, 5/17/1991, 5/10/1990, 3/23/1990, 1990    Pneumococcal Vaccine (PCV7) Historical Data 11/8/2015    TD Vaccine 9/18/2010  4:45 PM    Tdap Vaccine 8/6/2012      Below and/or attached are the medications your provider expects you to take. Review all of your home medications and newly ordered medications with your provider and/or pharmacist. Follow medication instructions as directed by your provider and/or pharmacist. Please keep your medication list with you and share with your provider. Update the information when medications are discontinued, doses are changed, or new medications (including over-the-counter products) are added; and carry medication information at all times in the event of emergency situations     Allergies:  CEFDINIR - Shortness of Breath,Itching     DEPAKOTE - Unspecified     ABILIFY - Unspecified     AMITRIPTYLINE - Unspecified     AMOXICILLIN - Rash     CIPROFLOXACIN - Rash     CLINDAMYCIN - Nausea     DOXYCYCLINE - Vomiting,Nausea     EES - Vomiting,Nausea     FLAGYL - Unspecified     FLOMAX - Swelling     METFORMIN - Unspecified     SULFA DRUGS - Hives,Rash     TAPE - Rash     VANCOMYCIN - Itching     CEPHALEXIN - Rash     LEVOFLOXACIN - Unspecified               Medications  Valid as of: January 14, 2017 - 12:20 PM    Generic Name Brand Name Tablet Size Instructions for use    Albuterol Sulfate (Aero Soln) albuterol 108 (90 BASE) MCG/ACT Inhale 2 Puffs by mouth every 6 hours as needed for Shortness of Breath.         Cephalexin (Cap) KEFLEX 250 MG Take 1 Cap by mouth 2 times a day for 10 days.        Cholecalciferol (Cap) Vitamin D3 36928 UNITS Take 1 Each by mouth every 7 days.        Clindamycin HCl (Cap) CLEOCIN 300 MG Take 1 Cap by mouth 3 times a day.        Clindamycin Phosphate (Suppos) CLEOCIN 100 MG Insert 1 Suppository in vagina every bedtime.        Coenzyme Q10 (Cap) Coenzyme Q10 300 MG Take 1 Cap by mouth every day at 6 PM.        Cyanocobalamin (Tab) vitamin b12 500 MCG Take 1,000 mcg by mouth 2 times a day.        Cyanocobalamin (Tab) VITAMIN B-12 100 MCG Take 100 mcg by mouth every day.        Cyclobenzaprine HCl (Tab) FLEXERIL 10 MG Take 10 mg by mouth 3 times a day as needed.        Fluconazole (Tab) DIFLUCAN 150 MG Take 1 Tab by mouth every day.        FLUoxetine HCl (Cap) PROZAC 10 MG Take 1 Cap by mouth every day.        Gabapentin (Once-Daily) (Tab) Gabapentin (Once-Daily) 600 MG Take 1 Tab by mouth 3 times a day. Pt reports she is taking 1 tab in a.m. and 2 tabs in p.m.        Ivabradine HCl (Tab) CORLANOR 5 MG Take 1 Tab by mouth 2 Times a Day.        Lactulose (Solution) lactulose 10 GM/15ML Take 15 mL by mouth 2 times a day as needed.        Levothyroxine Sodium (Tab) SYNTHROID 75 MCG Take 75 mcg by mouth Every morning on an empty stomach.        Melatonin (Tab) Melatonin 5 MG Take 10 mg by mouth at bedtime as needed (sleep aid).        Naproxen (Tab) NAPROSYN 500 MG Take 500 mg by mouth 2 times a day as needed (pain).        Nitrofurantoin Monohyd Macro (Cap) MACROBID 100 MG Take 1 Cap by mouth 2 times a day for 5 days.        Omeprazole (CAPSULE DELAYED RELEASE) PRILOSEC 20 MG Take 20 mg by mouth 2 times a day. takes 1x daily most of time        Oxybutynin Chloride (TABLET SR 24 HR) DITROPAN-XL 10 MG Take 10 mg by mouth 2 Times a Day.        Oxycodone-Acetaminophen (Tab) PERCOCET-10  MG Take 1-2 Tabs by mouth 2 Times a Day. Two tabs        Promethazine HCl (Tab) PHENERGAN 25 MG Take 1 Tab by  mouth every 6 hours as needed for Nausea/Vomiting.        RaNITidine HCl (Tab) ZANTAC 150 MG Take 150 mg by mouth 2 times a day.        Sodium Bicarbonate (Tab) sodium bicarbonate 325 MG Take 2 Tabs by mouth 3 times a day.        Zinc Oxide (Cream) Zinc Oxide 11.3 % Apply 1 Application to affected area(s) 1 time daily as needed (for skin irritations to coccyx).        Ziprasidone HCl (Cap) GEODON 80 MG Take 160 mg by mouth every evening.        .                 Medicines prescribed today were sent to:     [id number] - [place], [place] - [address] [place] [address]    Phone: [phone] Fax: [phone]    Open 24 Hours?: No      Medication refill instructions:       If your prescription bottle indicates you have medication refills left, it is not necessary to call your provider’s office. Please contact your pharmacy and they will refill your medication.    If your prescription bottle indicates you do not have any refills left, you may request refills at any time through one of the following ways: The online Ch[employer] system (except Urgent Care), by calling your provider’s office, or by asking your pharmacy to contact your provider’s office with a refill request. Medication refills are processed only during regular business hours and may not be available until the next business day. Your provider may request additional information or to have a follow-up visit with you prior to refilling your medication.   *Please Note: Medication refills are assigned a new Rx number when refilled electronically. Your pharmacy may indicate that no refills were authorized even though a new prescription for the same medication is available at the pharmacy. Please request the medicine by name with the pharmacy before contacting your provider for a refill.        Other Notes About Your Plan     DME:  [facility] / ph [phone] / fax [phone]              Chorus Access Code: Activation code not  generated  Current MyChart Status: Active

## 2017-01-18 ENCOUNTER — HOSPITAL ENCOUNTER (OUTPATIENT)
Dept: LAB | Facility: MEDICAL CENTER | Age: 27
End: 2017-01-18
Attending: INTERNAL MEDICINE
Payer: MEDICARE

## 2017-01-18 ENCOUNTER — HOSPITAL ENCOUNTER (EMERGENCY)
Facility: MEDICAL CENTER | Age: 27
End: 2017-01-18
Attending: EMERGENCY MEDICINE
Payer: MEDICARE

## 2017-01-18 ENCOUNTER — APPOINTMENT (OUTPATIENT)
Dept: RADIOLOGY | Facility: MEDICAL CENTER | Age: 27
End: 2017-01-18
Attending: EMERGENCY MEDICINE
Payer: MEDICARE

## 2017-01-18 ENCOUNTER — OFFICE VISIT (OUTPATIENT)
Dept: MEDICAL GROUP | Facility: MEDICAL CENTER | Age: 27
End: 2017-01-18
Payer: MEDICARE

## 2017-01-18 VITALS
HEIGHT: 62 IN | WEIGHT: 262 LBS | TEMPERATURE: 98.1 F | SYSTOLIC BLOOD PRESSURE: 132 MMHG | BODY MASS INDEX: 48.21 KG/M2 | HEART RATE: 88 BPM | RESPIRATION RATE: 18 BRPM | OXYGEN SATURATION: 95 % | DIASTOLIC BLOOD PRESSURE: 87 MMHG

## 2017-01-18 VITALS
SYSTOLIC BLOOD PRESSURE: 132 MMHG | DIASTOLIC BLOOD PRESSURE: 88 MMHG | HEIGHT: 62 IN | RESPIRATION RATE: 16 BRPM | WEIGHT: 262 LBS | TEMPERATURE: 97.6 F | OXYGEN SATURATION: 97 % | BODY MASS INDEX: 48.21 KG/M2 | HEART RATE: 82 BPM

## 2017-01-18 DIAGNOSIS — M54.50 ACUTE RIGHT-SIDED LOW BACK PAIN WITHOUT SCIATICA: ICD-10-CM

## 2017-01-18 DIAGNOSIS — J32.0 MAXILLARY SINUSITIS, UNSPECIFIED CHRONICITY: ICD-10-CM

## 2017-01-18 DIAGNOSIS — E86.0 DEHYDRATION: ICD-10-CM

## 2017-01-18 DIAGNOSIS — R73.9 HYPERGLYCEMIA: ICD-10-CM

## 2017-01-18 LAB
ALBUMIN SERPL BCP-MCNC: 4 G/DL (ref 3.2–4.9)
ALBUMIN SERPL BCP-MCNC: 4.6 G/DL (ref 3.2–4.9)
ALBUMIN/GLOB SERPL: 1.8 G/DL
ALBUMIN/GLOB SERPL: 1.9 G/DL
ALP SERPL-CCNC: 69 U/L (ref 30–99)
ALP SERPL-CCNC: 78 U/L (ref 30–99)
ALT SERPL-CCNC: 63 U/L (ref 2–50)
ALT SERPL-CCNC: 65 U/L (ref 2–50)
ANION GAP SERPL CALC-SCNC: 10 MMOL/L (ref 0–11.9)
ANION GAP SERPL CALC-SCNC: 9 MMOL/L (ref 0–11.9)
APPEARANCE UR: CLEAR
AST SERPL-CCNC: 41 U/L (ref 12–45)
AST SERPL-CCNC: 44 U/L (ref 12–45)
BACTERIA #/AREA URNS HPF: ABNORMAL /HPF
BASOPHILS # BLD AUTO: 0.05 K/UL (ref 0–0.12)
BASOPHILS # BLD AUTO: 0.8 % (ref 0–1.8)
BASOPHILS # BLD: 0.06 K/UL (ref 0–0.12)
BASOPHILS NFR BLD AUTO: 0.8 % (ref 0–1.8)
BILIRUB SERPL-MCNC: 0.4 MG/DL (ref 0.1–1.5)
BILIRUB SERPL-MCNC: 0.4 MG/DL (ref 0.1–1.5)
BILIRUB UR QL STRIP.AUTO: NEGATIVE
BUN SERPL-MCNC: 14 MG/DL (ref 8–22)
BUN SERPL-MCNC: 16 MG/DL (ref 8–22)
CALCIUM SERPL-MCNC: 9.4 MG/DL (ref 8.4–10.2)
CALCIUM SERPL-MCNC: 9.9 MG/DL (ref 8.5–10.5)
CHLORIDE SERPL-SCNC: 105 MMOL/L (ref 96–112)
CHLORIDE SERPL-SCNC: 106 MMOL/L (ref 96–112)
CO2 SERPL-SCNC: 21 MMOL/L (ref 20–33)
CO2 SERPL-SCNC: 23 MMOL/L (ref 20–33)
COLOR UR: YELLOW
CREAT SERPL-MCNC: 0.72 MG/DL (ref 0.5–1.4)
CREAT SERPL-MCNC: 0.73 MG/DL (ref 0.5–1.4)
EOSINOPHIL # BLD AUTO: 0.22 K/UL (ref 0–0.51)
EOSINOPHIL # BLD: 0.21 K/UL (ref 0–0.51)
EOSINOPHIL NFR BLD AUTO: 3.2 % (ref 0–6.9)
EOSINOPHIL NFR BLD: 3.1 % (ref 0–6.9)
EPI CELLS #/AREA URNS HPF: ABNORMAL /HPF
ERYTHROCYTE [DISTWIDTH] IN BLOOD BY AUTOMATED COUNT: 40.1 FL (ref 35.9–50)
ERYTHROCYTE [DISTWIDTH] IN BLOOD BY AUTOMATED COUNT: 41.1 FL (ref 35.9–50)
GFR SERPL CREATININE-BSD FRML MDRD: >60 ML/MIN/1.73 M 2
GLOBULIN SER CALC-MCNC: 2.2 G/DL (ref 1.9–3.5)
GLOBULIN SER CALC-MCNC: 2.4 G/DL (ref 1.9–3.5)
GLUCOSE SERPL-MCNC: 159 MG/DL (ref 65–99)
GLUCOSE SERPL-MCNC: 203 MG/DL (ref 65–99)
GLUCOSE UR STRIP.AUTO-MCNC: NEGATIVE MG/DL
HCT VFR BLD AUTO: 35.4 % (ref 37–47)
HCT VFR BLD AUTO: 39.5 % (ref 37–47)
HGB BLD-MCNC: 12.5 G/DL (ref 12–16)
HGB BLD-MCNC: 13.6 G/DL (ref 12–16)
IMM GRANULOCYTES # BLD AUTO: 0.02 K/UL (ref 0–0.11)
IMM GRANULOCYTES # BLD AUTO: 0.03 K/UL (ref 0–0.11)
IMM GRANULOCYTES NFR BLD AUTO: 0.3 % (ref 0–0.9)
IMM GRANULOCYTES NFR BLD AUTO: 0.5 % (ref 0–0.9)
KETONES UR STRIP.AUTO-MCNC: NEGATIVE MG/DL
LACTATE BLD-SCNC: 1.69 MMOL/L (ref 0.5–2)
LACTATE BLD-SCNC: 2.79 MMOL/L (ref 0.5–2)
LEUKOCYTE ESTERASE UR QL STRIP.AUTO: NEGATIVE
LYMPHOCYTES # BLD AUTO: 2.58 K/UL (ref 1–4.8)
LYMPHOCYTES # BLD: 2.49 K/UL (ref 1–4.8)
LYMPHOCYTES NFR BLD AUTO: 38 % (ref 22–41)
LYMPHOCYTES NFR BLD: 36.1 % (ref 22–41)
MCH RBC QN AUTO: 30.4 PG (ref 27–33)
MCH RBC QN AUTO: 30.6 PG (ref 27–33)
MCHC RBC AUTO-ENTMCNC: 34.4 G/DL (ref 33.6–35)
MCHC RBC AUTO-ENTMCNC: 35.3 G/DL (ref 33.6–35)
MCV RBC AUTO: 86.6 FL (ref 81.4–97.8)
MCV RBC AUTO: 88.4 FL (ref 81.4–97.8)
MICRO URNS: ABNORMAL
MONOCYTES # BLD AUTO: 0.57 K/UL (ref 0–0.85)
MONOCYTES # BLD: 0.42 K/UL (ref 0–0.85)
MONOCYTES NFR BLD AUTO: 6.4 % (ref 0–13.4)
MONOCYTES NFR BLD AUTO: 8 % (ref 0–13.4)
MUCOUS THREADS #/AREA URNS HPF: ABNORMAL /HPF
NEUTROPHILS # BLD AUTO: 3.69 K/UL (ref 2–7.15)
NEUTROPHILS # BLD: 3.35 K/UL (ref 2–7.15)
NEUTROPHILS NFR BLD AUTO: 51.1 % (ref 44–72)
NEUTROPHILS NFR BLD: 51.7 % (ref 44–72)
NITRITE UR QL STRIP.AUTO: NEGATIVE
NRBC # BLD AUTO: 0 K/UL
NRBC # BLD AUTO: 0 K/UL
NRBC BLD AUTO-RTO: 0 /100 WBC
NRBC BLD-RTO: 0 /100 WBC
PH UR STRIP.AUTO: 8 [PH]
PLATELET # BLD AUTO: 193 K/UL (ref 164–446)
PLATELET # BLD AUTO: 215 K/UL (ref 164–446)
PMV BLD AUTO: 11.3 FL (ref 9–12.9)
PMV BLD AUTO: 11.6 FL (ref 9–12.9)
POTASSIUM SERPL-SCNC: 3.9 MMOL/L (ref 3.6–5.5)
POTASSIUM SERPL-SCNC: 3.9 MMOL/L (ref 3.6–5.5)
PROT SERPL-MCNC: 6.2 G/DL (ref 6–8.2)
PROT SERPL-MCNC: 7 G/DL (ref 6–8.2)
PROT UR QL STRIP: NEGATIVE MG/DL
RBC # BLD AUTO: 4.09 M/UL (ref 4.2–5.4)
RBC # BLD AUTO: 4.47 M/UL (ref 4.2–5.4)
RBC # URNS HPF: ABNORMAL /HPF
RBC UR QL AUTO: ABNORMAL
SODIUM SERPL-SCNC: 137 MMOL/L (ref 135–145)
SODIUM SERPL-SCNC: 137 MMOL/L (ref 135–145)
SP GR UR STRIP.AUTO: 1.01
SPECIMEN DRAWN FROM PATIENT: ABNORMAL
SPECIMEN DRAWN FROM PATIENT: NORMAL
WBC # BLD AUTO: 6.6 K/UL (ref 4.8–10.8)
WBC # BLD AUTO: 7.1 K/UL (ref 4.8–10.8)
WBC #/AREA URNS HPF: ABNORMAL /HPF

## 2017-01-18 PROCEDURE — 83605 ASSAY OF LACTIC ACID: CPT | Mod: 91

## 2017-01-18 PROCEDURE — 36415 COLL VENOUS BLD VENIPUNCTURE: CPT

## 2017-01-18 PROCEDURE — 99285 EMERGENCY DEPT VISIT HI MDM: CPT

## 2017-01-18 PROCEDURE — 81001 URINALYSIS AUTO W/SCOPE: CPT

## 2017-01-18 PROCEDURE — 85025 COMPLETE CBC W/AUTO DIFF WBC: CPT | Mod: 91

## 2017-01-18 PROCEDURE — 80053 COMPREHEN METABOLIC PANEL: CPT | Mod: 91

## 2017-01-18 PROCEDURE — 700105 HCHG RX REV CODE 258: Performed by: EMERGENCY MEDICINE

## 2017-01-18 PROCEDURE — 99213 OFFICE O/P EST LOW 20 MIN: CPT | Performed by: INTERNAL MEDICINE

## 2017-01-18 PROCEDURE — 700102 HCHG RX REV CODE 250 W/ 637 OVERRIDE(OP): Performed by: EMERGENCY MEDICINE

## 2017-01-18 PROCEDURE — 71010 DX-CHEST-PORTABLE (1 VIEW): CPT

## 2017-01-18 PROCEDURE — 96374 THER/PROPH/DIAG INJ IV PUSH: CPT

## 2017-01-18 PROCEDURE — 87040 BLOOD CULTURE FOR BACTERIA: CPT

## 2017-01-18 PROCEDURE — A9270 NON-COVERED ITEM OR SERVICE: HCPCS | Performed by: EMERGENCY MEDICINE

## 2017-01-18 PROCEDURE — 80053 COMPREHEN METABOLIC PANEL: CPT

## 2017-01-18 PROCEDURE — 85025 COMPLETE CBC W/AUTO DIFF WBC: CPT

## 2017-01-18 PROCEDURE — 87086 URINE CULTURE/COLONY COUNT: CPT

## 2017-01-18 PROCEDURE — 96361 HYDRATE IV INFUSION ADD-ON: CPT

## 2017-01-18 PROCEDURE — 700111 HCHG RX REV CODE 636 W/ 250 OVERRIDE (IP): Performed by: EMERGENCY MEDICINE

## 2017-01-18 RX ORDER — SODIUM CHLORIDE 9 MG/ML
2000 INJECTION, SOLUTION INTRAVENOUS CONTINUOUS
Status: DISCONTINUED | OUTPATIENT
Start: 2017-01-18 | End: 2017-01-18 | Stop reason: HOSPADM

## 2017-01-18 RX ORDER — CEFPODOXIME PROXETIL 100 MG/1
100 TABLET, FILM COATED ORAL 2 TIMES DAILY
Qty: 20 TAB | Refills: 0 | Status: SHIPPED | OUTPATIENT
Start: 2017-01-18 | End: 2017-01-22

## 2017-01-18 RX ORDER — OXYCODONE AND ACETAMINOPHEN 10; 325 MG/1; MG/1
1 TABLET ORAL ONCE
Status: COMPLETED | OUTPATIENT
Start: 2017-01-18 | End: 2017-01-18

## 2017-01-18 RX ORDER — ONDANSETRON 2 MG/ML
4 INJECTION INTRAMUSCULAR; INTRAVENOUS ONCE
Status: COMPLETED | OUTPATIENT
Start: 2017-01-18 | End: 2017-01-18

## 2017-01-18 RX ORDER — CEFDINIR 300 MG/1
300 CAPSULE ORAL EVERY 12 HOURS
Status: DISCONTINUED | OUTPATIENT
Start: 2017-01-18 | End: 2017-01-18 | Stop reason: HOSPADM

## 2017-01-18 RX ADMIN — SODIUM CHLORIDE 2000 ML: 9 INJECTION, SOLUTION INTRAVENOUS at 19:37

## 2017-01-18 RX ADMIN — OXYCODONE HYDROCHLORIDE AND ACETAMINOPHEN 1 TABLET: 10; 325 TABLET ORAL at 20:53

## 2017-01-18 RX ADMIN — CEFDINIR 300 MG: 300 CAPSULE ORAL at 21:19

## 2017-01-18 RX ADMIN — ONDANSETRON 4 MG: 2 INJECTION, SOLUTION INTRAMUSCULAR; INTRAVENOUS at 19:50

## 2017-01-18 ASSESSMENT — PAIN SCALES - GENERAL: PAINLEVEL_OUTOF10: 8

## 2017-01-18 ASSESSMENT — PATIENT HEALTH QUESTIONNAIRE - PHQ9: CLINICAL INTERPRETATION OF PHQ2 SCORE: 0

## 2017-01-18 NOTE — ED AVS SNAPSHOT
1/18/2017          Kristin Balderrama  [address] [facility] [place] [address]    Dear Kristin:    Pending sale to [facility] wants to ensure your discharge home is safe and you or your loved ones have had all your questions answered regarding your care after you leave the hospital.    You may receive a telephone call within two days of your discharge.  This call is to make certain you understand your discharge instructions as well as ensure we provided you with the best care possible during your stay with us.     The call will only last approximately 3-5 minutes and will be done by a nurse.    Once again, we want to ensure your discharge home is safe and that you have a clear understanding of any next steps in your care.  If you have any questions or concerns, please do not hesitate to contact us, we are here for you.  Thank you for choosing [facility] for your healthcare needs.    Sincerely,    Lorenzo Zuleta    [facility]

## 2017-01-18 NOTE — PROGRESS NOTES
CC: Sinus pain.    HPI:   Kristin presents today with the following.    1. Maxillary sinusitis, unspecified chronicity  Presents reporting return of sinus pain and ear fullness. She is reporting intermittent fevers as well. She is on anabolic for urinary tract infection however Macrobid and not good sinus penetration. She denies any cough or shortness of breath ears are full but not painful.      Patient Active Problem List    Diagnosis Date Noted   • Quadriparesis (CMS-HCC) 11/01/2016     Priority: High   • Paralysis (CMS-HCC) 10/27/2016     Priority: High   • Sinus tachycardia 10/31/2013     Priority: High   • HTN (hypertension) 11/01/2016     Priority: Medium   • Chronic pain syndrome 10/27/2016     Priority: Medium   • Dysphagia 10/27/2016     Priority: Medium   • Depression 10/28/2016     Priority: Low   • Schizophrenia (CMS-HCC) 10/27/2016     Priority: Low   • Suprapubic catheter (CMS-HCC) 10/27/2016     Priority: Low   • Psychosis, schizophrenia, simple (HCC) 09/29/2016     Priority: Low     Class: Chronic   • Hypothyroidism 11/23/2015     Priority: Low   • PCOS (polycystic ovarian syndrome) 11/23/2015     Priority: Low   • Chronic back pain 06/29/2015     Priority: Low   • Progressive focal motor weakness 06/28/2015     Priority: Low   • Fatty liver disease, nonalcoholic 01/19/2015     Priority: Low   • Anxiety 12/16/2014     Priority: Low   • Knee pain, right 02/13/2014     Priority: Low   • Neurogenic bladder 04/02/2011     Priority: Low   • Chronic UTI 09/18/2010     Priority: Low   • Multiple personality disorder 09/18/2010     Priority: Low   • Leg weakness 01/04/2017   • Hypovitaminosis D 11/29/2016   • Bowel and bladder incontinence 10/27/2016   • Galactorrhea 07/22/2016   • Elevated sedimentation rate 06/27/2016   • Weakness of both lower extremities 06/22/2016   • Right flank pain 06/06/2016   • Chronic inflammatory arthritis 05/23/2016   • Vitamin D deficiency 05/21/2016   • TONYA on CPAP 03/07/2016    • Morbidly obese (CMS-HCC) 03/07/2016   • Conversion disorder 03/07/2016   • Scoliosis 03/07/2016   • GERD (gastroesophageal reflux disease) 03/07/2016   • Peripheral neuropathy (CMS-HCC) 03/06/2016   • H/O prior ablation treatment 02/10/2016       Current Outpatient Prescriptions   Medication Sig Dispense Refill   • cefpodoxime (VANTIN) 100 MG tablet Take 1 Tab by mouth 2 times a day. 20 Tab 0   • nitrofurantoin monohydr macro (MACROBID) 100 MG Cap Take 1 Cap by mouth 2 times a day for 5 days. 10 Cap 0   • cyanocobalamin (VITAMIN B-12) 100 MCG Tab Take 100 mcg by mouth every day.     • oxybutynin SR (DITROPAN-XL) 10 MG CR tablet Take 10 mg by mouth 2 Times a Day.     • cyclobenzaprine (FLEXERIL) 10 MG Tab Take 10 mg by mouth 3 times a day as needed.     • fluoxetine (PROZAC) 10 MG Cap Take 1 Cap by mouth every day. 30 Cap 1   • oxycodone-acetaminophen (PERCOCET-10)  MG Tab Take 1-2 Tabs by mouth 2 Times a Day. Two tabs     • promethazine (PHENERGAN) 25 MG Tab Take 1 Tab by mouth every 6 hours as needed for Nausea/Vomiting. 30 Tab 0   • clindamycin (CLEOCIN) 300 MG Cap Take 1 Cap by mouth 3 times a day. 30 Cap 0   • Cholecalciferol (VITAMIN D3) 50181 UNITS Cap Take 1 Each by mouth every 7 days. 4 Cap 4   • naproxen (NAPROSYN) 500 MG Tab Take 500 mg by mouth 2 times a day as needed (pain).     • lactulose 10 GM/15ML Solution Take 15 mL by mouth 2 times a day as needed. 1 Bottle 3   • albuterol 108 (90 BASE) MCG/ACT Aero Soln inhalation aerosol Inhale 2 Puffs by mouth every 6 hours as needed for Shortness of Breath.     • Coenzyme Q10 (EQL COQ10) 300 MG Cap Take 1 Cap by mouth every day at 6 PM.     • cyanocobalamin (HM VITAMIN B12) 500 MCG tablet Take 1,000 mcg by mouth 2 times a day.     • Melatonin 5 MG Tab Take 10 mg by mouth at bedtime as needed (sleep aid).     • Zinc Oxide 11.3 % Cream Apply 1 Application to affected area(s) 1 time daily as needed (for skin irritations to coccyx).     • sodium  "bicarbonate 325 MG Tab Take 2 Tabs by mouth 3 times a day.     • ziprasidone (GEODON) 80 MG Cap Take 160 mg by mouth every evening.     • levothyroxine (SYNTHROID) 75 MCG Tab Take 75 mcg by mouth Every morning on an empty stomach.     • Ivabradine HCl (CORLANOR) 5 MG Tab Take 1 Tab by mouth 2 Times a Day. 60 Tab 6   • Gabapentin, Once-Daily, (GRALISE) 600 MG Tab Take 1 Tab by mouth 3 times a day. Pt reports she is taking 1 tab in a.m. and 2 tabs in p.m.     • omeprazole (PRILOSEC) 20 MG delayed-release capsule Take 20 mg by mouth 2 times a day. takes 1x daily most of time     • ranitidine (ZANTAC) 150 MG Tab Take 150 mg by mouth 2 times a day.       No current facility-administered medications for this visit.         Allergies as of 01/18/2017 - Joe as Reviewed 01/18/2017   Allergen Reaction Noted   • Cefdinir Shortness of Breath and Itching 03/01/2016   • Depakote [divalproex sodium] Unspecified 06/14/2010   • Abilify Unspecified 01/17/2013   • Amitriptyline Unspecified 10/31/2013   • Amoxicillin Rash 09/18/2010   • Ciprofloxacin Rash 12/17/2009   • Clindamycin Nausea 02/02/2011   • Doxycycline Vomiting and Nausea 08/15/2012   • Ees [erythromycin] Vomiting and Nausea 08/28/2010   • Flagyl [metronidazole hcl] Unspecified 03/31/2011   • Flomax [tamsulosin hydrochloride] Swelling 09/24/2009   • Metformin Unspecified 07/23/2013   • Sulfa drugs Hives and Rash 09/18/2010   • Tape Rash 08/15/2012   • Vancomycin Itching 07/10/2016   • Cephalexin [keflex] Rash 01/01/2017   • Levofloxacin Unspecified 10/27/2016        ROS: As per HPI.    /88 mmHg  Pulse 82  Temp(Src) 36.4 °C (97.6 °F)  Resp 16  Ht 1.575 m (5' 2\")  Wt 118.842 kg (262 lb)  BMI 47.91 kg/m2  SpO2 97%    Physical Exam:  Gen:         Alert and oriented, No apparent distress.  Heent:       TMs clear bilaterally, oropharynx without erythema or exudates pain with palpation of her maxillary sinus   Neck:        No Lymphadenopathy or Bruits.  Lungs:     " Clear to auscultation bilaterally  CV:          Regular rate and rhythm. No murmurs, rubs or gallops.               Ext:          No clubbing, cyanosis, edema.      Assessment and Plan.   26 y.o. female with the following issues.    1. Maxillary sinusitis, unspecified chronicity  Placed patient on antibiotics for the next 10 days recommended over-the-counter symptom relief. Followup if not improving. She did tolerate this antibody last time she was taking it. We have sent for blood work given her fevers to ensure she does not have a large white count.  - COMP METABOLIC PANEL; Future  - CBC WITH DIFFERENTIAL; Future  - cefpodoxime (VANTIN) 100 MG tablet; Take 1 Tab by mouth 2 times a day.  Dispense: 20 Tab; Refill: 0

## 2017-01-18 NOTE — ED AVS SNAPSHOT
Plays.IO Access Code: Activation code not generated  Current Plays.IO Status: Active    [employer]  A secure, online tool to manage your health information     [employer] Plays[employer]IO® is a secure, online tool that connects you to your personalized health information from the privacy of your home -- day or night - making it very easy for you to manage your healthcare. Once the activation process is completed, you can even access your medical information using the Plays.IO rocio, which is available for free in the Apple Rocio store or Google Play store.     Plays.IO provides the following levels of access (as shown below):   My Chart Features   [facility] Primary Care Doctor [facility]  Specialists [facility]  Urgent  Care Non-[facility]  Primary Care  Doctor   Email your healthcare team securely and privately 24/7 X X X X   Manage appointments: schedule your next appointment; view details of past/upcoming appointments X      Request prescription refills. X      View recent personal medical records, including lab and immunizations X X X X   View health record, including health history, allergies, medications X X X X   Read reports about your outpatient visits, procedures, consult and ER notes X X X X   See your discharge summary, which is a recap of your hospital and/or ER visit that includes your diagnosis, lab results, and care plan. X X       How to register for [email or web address]:  1. Go to  [email or web address].  2. Click on the Sign Up Now box, which takes you to the New Member Sign Up page. You will need to provide the following information:  a. Enter your [email or web address] Access Code exactly as it appears at the top of this page. (You will not need to use this code after you’ve completed the sign-up process. If you do not sign up before the expiration date, you must request a new code.)   b. Enter your date of birth.   c. Enter your home email address.   d. Click Submit, and follow the next screen’s instructions.  3. Create a Plays.IO ID. This will  be your [employer] login ID and cannot be changed, so think of one that is secure and easy to remember.  4. Create a [employer] password. You can change your password at any time.  5. Enter your Password Reset Question and Answer. This can be used at a later time if you forget your password.   6. Enter your e-mail address. This allows you to receive e-mail notifications when new information is available in [employer] [facility].  7. Click Sign Up. You can now view your health information.    For assistance activating your [employer] account, call [phone]

## 2017-01-18 NOTE — MR AVS SNAPSHOT
"        Kristin Balderrama   2017 1:20 PM   Office Visit   MRN: [id number]    Department:  [address] [place] [address]   Dept Phone:  [phone]    Description:  Female : 1990   Provider:  Torres Brody M.D.           Reason for Visit     Dysuria worse      Allergies as of 2017     Allergen Noted Reactions    Cefdinir 2016   Shortness of Breath, Itching    RXN=unknown    Depakote [Divalproex Sodium] 2010   Unspecified    Muscle spasms/muscle pain and weakness  RXN=unknown    Abilify 2013   Unspecified    Headaches/muscle twitching  RXN=unknown    Amitriptyline 10/31/2013   Unspecified    Headaches  RXN=unknown    Amoxicillin 2010   Rash    RXN=unknown    Ciprofloxacin 2009   Rash    RXN=unknown    Clindamycin 2011   Nausea    Even with food  RXN=unkown    Doxycycline 08/15/2012   Vomiting, Nausea    RXN=unknown    Ees [Erythromycin] 2010   Vomiting, Nausea    RXN=unknown    Flagyl [Metronidazole Hcl] 2011   Unspecified    \"eye problems\"  RXN=uknown    Flomax [Tamsulosin Hydrochloride] 2009   Swelling    RXN=unknown    Metformin 2013   Unspecified    Increased lactic acid   RXN=unknown    Sulfa Drugs 2010   Hives, Rash    RXN=since childhood    Tape 08/15/2012   Rash    Tears skin off   coban with Tegaderm tape ok  RXN=ongoing    Vancomycin 07/10/2016   Itching    Pt becomes flushed in face and chest.   RXN=7/10/16    Cephalexin [Keflex] 2017   Rash    Pt states she gets a rash with this medication    Levofloxacin 10/27/2016   Unspecified    Leg muscle cramps      You were diagnosed with     Maxillary sinusitis, unspecified chronicity   [[id number]]         Vital Signs     Blood Pressure Pulse Temperature Respirations Height Weight    132/88 mmHg 82 36.4 °C (97.6 °F) 16 1.575 m (5' 2\") 118.842 kg (262 lb)    Body Mass Index Oxygen Saturation Smoking Status             47.91 kg/m2 97% Passive Smoke Exposure - Never Smoker         Basic " Information     Date Of Birth Sex Race Ethnicity Preferred Language    1990 Female White Non- English      Your appointments     Jan 19, 2017  4:00 PM   FOLLOW UP with Torres Kumar M.D.   [facility] (--)    [address], [address]  [place] [id number] [phone]            Jan 31, 2017  9:00 AM   Follow Up Med Management with Ronny Burnette M.D.   [facility] [address] [place] [address])    [address] [facility]  [address]  [place] [address]   [phone]            Mar 01, 2017  9:00 AM   Individual Therapy with JUANITA StatonSVAISHALI   [facility] ([facility]    [address]  [address]  [place] [id number] [phone]            Mar 15, 2017  9:00 AM   Individual Therapy with JUANITA StatonSVAISHALI   [facility] ([facility])    [address]  [address]  [place] [id number] [phone]            Mar 29, 2017  9:00 AM   Individual Therapy with JUANITA StatonSVAISHALI   [facility] ([facility])    [address]  [address]  [place] [id number] [phone]            Apr 12, 2017  9:00 AM   Individual Therapy with JUANITA StatonSVAISHALI   [facility] ([facility])    [address]  [address]  [place] [id number] [phone]            May 23, 2017  9:40 AM   FOLLOW UP with Torres Kumar M.D.   [facility] (--)    [address]  [place] [id number] [phone]              Problem List              ICD-10-CM Priority Class Noted - Resolved    Chronic UTI N39.0 Low  9/18/2010 - Present    Multiple personality disorder F44.81 Low  9/18/2010 - Present    Neurogenic bladder N31.9 Low  4/2/2011 - Present    Sinus tachycardia R00.0 High  10/31/2013 - Present    Knee pain, right M25.561 Low  2/13/2014 - Present    Anxiety F41.9 Low  12/16/2014 - Present    Fatty liver disease, nonalcoholic K76.0 Low  1/19/2015 - Present    Progressive focal motor weakness M62.81  Low  6/28/2015 - Present    Chronic back pain M54.9, G89.29 Low  6/29/2015 - Present    Hypothyroidism E03.9 Low  11/23/2015 - Present    PCOS (polycystic ovarian syndrome) E28.2 Low  11/23/2015 - Present    H/O prior ablation treatment Z98.890   2/10/2016 - Present    Peripheral neuropathy (CMS-HCC) G62.9   3/6/2016 - Present    TONYA on CPAP G47.33   3/7/2016 - Present    Morbidly obese (CMS-HCC) E66.01   3/7/2016 - Present    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   3/7/2016 - Present    Scoliosis M41.9   3/7/2016 - Present    GERD (gastroesophageal reflux disease) K21.9   3/7/2016 - Present    Vitamin D deficiency E55.9   5/21/2016 - Present    Chronic inflammatory arthritis M19.90   5/23/2016 - Present    Right flank pain R10.9   6/6/2016 - Present    Weakness of both lower extremities M62.81   6/22/2016 - Present    Elevated sedimentation rate R70.0   6/27/2016 - Present    Galactorrhea O92.6   7/22/2016 - Present    Psychosis, schizophrenia, simple (HCC) (Chronic) F20.89 Low Chronic 9/29/2016 - Present    Schizophrenia (CMS-HCC) F20.9 Low  10/27/2016 - Present    Paralysis (CMS-HCC) G83.9 High  10/27/2016 - Present    Chronic pain syndrome G89.4 Medium  10/27/2016 - Present    Suprapubic catheter (CMS-HCC) Z93.59 Low  10/27/2016 - Present    Bowel and bladder incontinence R32, R15.9   10/27/2016 - Present    Dysphagia R13.10 Medium  10/27/2016 - Present    Depression F32.9 Low  10/28/2016 - Present    HTN (hypertension) I10 Medium  11/1/2016 - Present    Quadriparesis (CMS-HCC) G82.50 High  11/1/2016 - Present    Hypovitaminosis D E55.9   11/29/2016 - Present    Leg weakness M62.81   1/4/2017 - Present      Health Maintenance        Date Due Completion Dates    IMM HEP A VACCINE (1 of 2 - Standard Series) 10/13/1990 ---    IMM VARICELLA (CHICKENPOX) VACCINE (1 of 2 - 2 Dose Adolescent Series) 10/13/2002 ---    PAP SMEAR 7/22/2019 7/22/2016 (Postponed), 2/19/2013 (N/S)    Override on 7/22/2016: Postponed (per pt was told  "could \"skip\" 2016)    Override on 2/19/2013: (N/S) ([place])    IMM DTaP/Tdap/Td Vaccine (7 - Td) 8/6/2022 8/6/2012, 9/18/2010, 3/3/1994, 5/17/1991, 5/10/1990, 3/23/1990, 1990    COLONOSCOPY 9/25/2023 9/25/2013            Current Immunizations     Dtap Vaccine 3/3/1994, 5/17/1991, 5/10/1990, 3/23/1990, 1990    HIB Vaccine(PEDVAX) 1/11/1991    HPV Quadrivalent Vaccine (GARDASIL) 10/27/2011, 5/27/2011, 3/1/2011    Hepatitis B Vaccine Non-Recombivax (Ped/Adol) 1/28/2004, 10/28/2003, 10/29/1999    Influenza TIV (IM) 9/5/2013, 12/7/2011, 11/7/2011    Influenza Vaccine Adult HD 10/5/2016    MMR Vaccine 3/3/1994, 1/11/1991    OPV - Historical Data 3/3/1994, 5/17/1991, 5/10/1990, 3/23/1990, 1990    Pneumococcal Vaccine (PCV7) Historical Data 11/8/2015    TD Vaccine 9/18/2010  4:45 PM    Tdap Vaccine 8/6/2012      Below and/or attached are the medications your provider expects you to take. Review all of your home medications and newly ordered medications with your provider and/or pharmacist. Follow medication instructions as directed by your provider and/or pharmacist. Please keep your medication list with you and share with your provider. Update the information when medications are discontinued, doses are changed, or new medications (including over-the-counter products) are added; and carry medication information at all times in the event of emergency situations     Allergies:  CEFDINIR - Shortness of Breath,Itching     DEPAKOTE - Unspecified     ABILIFY - Unspecified     AMITRIPTYLINE - Unspecified     AMOXICILLIN - Rash     CIPROFLOXACIN - Rash     CLINDAMYCIN - Nausea     DOXYCYCLINE - Vomiting,Nausea     EES - Vomiting,Nausea     FLAGYL - Unspecified     FLOMAX - Swelling     METFORMIN - Unspecified     SULFA DRUGS - Hives,Rash     TAPE - Rash     VANCOMYCIN - Itching     CEPHALEXIN - Rash     LEVOFLOXACIN - Unspecified               Medications  Valid as of: January 18, 2017 -  2:24 PM    Generic Name " Brand Name Tablet Size Instructions for use    Albuterol Sulfate (Aero Soln) albuterol 108 (90 BASE) MCG/ACT Inhale 2 Puffs by mouth every 6 hours as needed for Shortness of Breath.        Cefpodoxime Proxetil (Tab) VANTIN 100 MG Take 1 Tab by mouth 2 times a day.        Cholecalciferol (Cap) Vitamin D3 26385 UNITS Take 1 Each by mouth every 7 days.        Clindamycin HCl (Cap) CLEOCIN 300 MG Take 1 Cap by mouth 3 times a day.        Coenzyme Q10 (Cap) Coenzyme Q10 300 MG Take 1 Cap by mouth every day at 6 PM.        Cyanocobalamin (Tab) vitamin b12 500 MCG Take 1,000 mcg by mouth 2 times a day.        Cyanocobalamin (Tab) VITAMIN B-12 100 MCG Take 100 mcg by mouth every day.        Cyclobenzaprine HCl (Tab) FLEXERIL 10 MG Take 10 mg by mouth 3 times a day as needed.        FLUoxetine HCl (Cap) PROZAC 10 MG Take 1 Cap by mouth every day.        Gabapentin (Once-Daily) (Tab) Gabapentin (Once-Daily) 600 MG Take 1 Tab by mouth 3 times a day. Pt reports she is taking 1 tab in a.m. and 2 tabs in p.m.        Ivabradine HCl (Tab) CORLANOR 5 MG Take 1 Tab by mouth 2 Times a Day.        Lactulose (Solution) lactulose 10 GM/15ML Take 15 mL by mouth 2 times a day as needed.        Levothyroxine Sodium (Tab) SYNTHROID 75 MCG Take 75 mcg by mouth Every morning on an empty stomach.        Melatonin (Tab) Melatonin 5 MG Take 10 mg by mouth at bedtime as needed (sleep aid).        Naproxen (Tab) NAPROSYN 500 MG Take 500 mg by mouth 2 times a day as needed (pain).        Nitrofurantoin Monohyd Macro (Cap) MACROBID 100 MG Take 1 Cap by mouth 2 times a day for 5 days.        Omeprazole (CAPSULE DELAYED RELEASE) PRILOSEC 20 MG Take 20 mg by mouth 2 times a day. takes 1x daily most of time        Oxybutynin Chloride (TABLET SR 24 HR) DITROPAN-XL 10 MG Take 10 mg by mouth 2 Times a Day.        Oxycodone-Acetaminophen (Tab) PERCOCET-10  MG Take 1-2 Tabs by mouth 2 Times a Day. Two tabs        Promethazine HCl (Tab) PHENERGAN 25 MG  Take 1 Tab by mouth every 6 hours as needed for Nausea/Vomiting.        RaNITidine HCl (Tab) ZANTAC 150 MG Take 150 mg by mouth 2 times a day.        Sodium Bicarbonate (Tab) sodium bicarbonate 325 MG Take 2 Tabs by mouth 3 times a day.        Zinc Oxide (Cream) Zinc Oxide 11.3 % Apply 1 Application to affected area(s) 1 time daily as needed (for skin irritations to coccyx).        Ziprasidone HCl (Cap) GEODON 80 MG Take 160 mg by mouth every evening.        .                 Medicines prescribed today were sent to:     [id number] - [place], [place] - [address] [place] [address]    Phone: [phone] Fax: [phone]    Open 24 Hours?: No      Medication refill instructions:       If your prescription bottle indicates you have medication refills left, it is not necessary to call your provider’s office. Please contact your pharmacy and they will refill your medication.    If your prescription bottle indicates you do not have any refills left, you may request refills at any time through one of the following ways: The online [employer] system (except Urgent Care), by calling your provider’s office, or by asking your pharmacy to contact your provider’s office with a refill request. Medication refills are processed only during regular business hours and may not be available until the next business day. Your provider may request additional information or to have a follow-up visit with you prior to refilling your medication.   *Please Note: Medication refills are assigned a new Rx number when refilled electronically. Your pharmacy may indicate that no refills were authorized even though a new prescription for the same medication is available at the pharmacy. Please request the medicine by name with the pharmacy before contacting your provider for a refill.        Your To Do List     Future Labs/Procedures Complete By Expires    CBC WITH DIFFERENTIAL  As directed 1/19/2018    COMP METABOLIC  PANEL  As directed 1/19/2018      Other Notes About Your Plan     DME:  [facility] / ph [phone] / fax [phone]              MyChart Access Code: Activation code not generated  Current MyChart Status: Active

## 2017-01-18 NOTE — ED AVS SNAPSHOT
After Visit Summary                                                                                                                Kristin Balderrama   MRN: [id number]    Department:  [facility], Emergency Dept   Date of Visit:  1/18/2017            [facility], Emergency Dept    [address]    [place] [address]    Phone:  [phone]      You were seen by     Wesley Joseph M.D.      Your Diagnosis Was     Dehydration     E86.0       These are the medications you received during your hospitalization from 01/18/2017 1753 to 01/18/2017 2122     Date/Time Order Dose Route Action    01/18/2017 1937 NS infusion 2,000 mL 2,000 mL Intravenous New Bag    01/18/2017 1950 ondansetron (ZOFRAN) syringe/vial injection 4 mg 4 mg Intravenous Given    01/18/2017 2053 oxycodone-acetaminophen (PERCOCET-10)  MG per tablet 1 Tab 1 Tab Oral Given    01/18/2017 2119 cefdinir (OMNICEF) capsule 300 mg 300 mg Oral Given      Follow-up Information     1. Schedule an appointment as soon as possible for a visit with Torres Brody M.D..    Specialty:  Internal Medicine    Why:  this week for recheck and further eval of your hyperglycemia    Contact information    [address]  [address]  [place] [address] [phone]        Medication Information     Review all of your home medications and newly ordered medications with your primary doctor and/or pharmacist as soon as possible. Follow medication instructions as directed by your doctor and/or pharmacist.     Please keep your complete medication list with you and share with your physician. Update the information when medications are discontinued, doses are changed, or new medications (including over-the-counter products) are added; and carry medication information at all times in the event of emergency situations.               Medication List      ASK your doctor about these medications        Instructions    albuterol 108  (90 BASE) MCG/ACT Aers inhalation aerosol    Inhale 2 Puffs by mouth every 6 hours as needed for Shortness of Breath.   Dose:  2 Puff       cefpodoxime 100 MG tablet   Commonly known as:  VANTIN    Take 1 Tab by mouth 2 times a day.   Dose:  100 mg       clindamycin 300 MG Caps   Commonly known as:  CLEOCIN    Take 1 Cap by mouth 3 times a day.   Dose:  300 mg       cyclobenzaprine 10 MG Tabs   Commonly known as:  FLEXERIL    Take 10 mg by mouth 3 times a day as needed.   Dose:  10 mg       EQL COQ10 300 MG Caps   Generic drug:  Coenzyme Q10    Take 1 Cap by mouth every day at 6 PM.   Dose:  1 Cap       fluoxetine 10 MG Caps   Commonly known as:  PROZAC    Take 1 Cap by mouth every day.   Dose:  10 mg       GRALISE 600 MG Tabs   Generic drug:  Gabapentin (Once-Daily)    Take 1 Tab by mouth 3 times a day. Pt reports she is taking 1 tab in a.m. and 2 tabs in p.m.   Dose:  1 Tab       * HM VITAMIN B12 500 MCG tablet   Generic drug:  cyanocobalamin    Take 1,000 mcg by mouth 2 times a day.   Dose:  1000 mcg       * cyanocobalamin 100 MCG Tabs   Commonly known as:  VITAMIN B-12    Take 100 mcg by mouth every day.   Dose:  100 mcg       ivabradine 5 MG Tabs tablet   Commonly known as:  CORLANOR    Take 1 Tab by mouth 2 Times a Day.   Dose:  1 Tab       lactulose 10 GM/15ML Soln    Take 15 mL by mouth 2 times a day as needed.   Dose:  10 g       levothyroxine 75 MCG Tabs   Commonly known as:  SYNTHROID    Take 75 mcg by mouth Every morning on an empty stomach.   Dose:  75 mcg       Melatonin 5 MG Tabs    Take 10 mg by mouth at bedtime as needed (sleep aid).   Dose:  10 mg       naproxen 500 MG Tabs   Commonly known as:  NAPROSYN    Take 500 mg by mouth 2 times a day as needed (pain).   Dose:  500 mg       nitrofurantoin monohydr macro 100 MG Caps   Commonly known as:  MACROBID    Take 1 Cap by mouth 2 times a day for 5 days.   Dose:  100 mg       omeprazole 20 MG delayed-release capsule   Commonly known as:  PRILOSEC     Take 20 mg by mouth 2 times a day. takes 1x daily most of time   Dose:  20 mg       oxybutynin SR 10 MG CR tablet   Commonly known as:  DITROPAN-XL    Take 10 mg by mouth 2 Times a Day.   Dose:  10 mg       oxycodone-acetaminophen  MG Tabs   Commonly known as:  PERCOCET-10    Take 1-2 Tabs by mouth 2 Times a Day. Two tabs   Dose:  1-2 Tab       promethazine 25 MG Tabs   Commonly known as:  PHENERGAN    Take 1 Tab by mouth every 6 hours as needed for Nausea/Vomiting.   Dose:  25 mg       ranitidine 150 MG Tabs   Commonly known as:  ZANTAC    Take 150 mg by mouth 2 times a day.   Dose:  150 mg       sodium bicarbonate 325 MG Tabs    Take 2 Tabs by mouth 3 times a day.   Dose:  650 mg       Vitamin D3 48959 UNITS Caps    Take 1 Each by mouth every 7 days.   Dose:  1 Each       Zinc Oxide 11.3 % Crea    Apply 1 Application to affected area(s) 1 time daily as needed (for skin irritations to coccyx).   Dose:  1 Application       ziprasidone 80 MG Caps   Commonly known as:  GEODON    Take 160 mg by mouth every evening.   Dose:  160 mg       * Notice:  This list has 2 medication(s) that are the same as other medications prescribed for you. Read the directions carefully, and ask your doctor or other care provider to review them with you.            Procedures and tests performed during your visit     Procedure/Test Number of Times Performed    BLOOD CULTURE 2    CBC WITH DIFFERENTIAL 1    COMP METABOLIC PANEL 1    DX-CHEST-PORTABLE (1 VIEW) 1    ESTIMATED GFR 1    LACTIC ACID 2    URINALYSIS 1    URINE CULTURE(NEW) 1    URINE MICROSCOPIC (W/UA) 1        Discharge Instructions       Back Pain, Adult  Back pain is very common in adults. The cause of back pain is rarely dangerous and the pain often gets better over time. The cause of your back pain may not be known. Some common causes of back pain include:  · Strain of the muscles or ligaments supporting the spine.  · Wear and tear (degeneration) of the spinal  disks.  · Arthritis.  · Direct injury to the back.  For many people, back pain may return. Since back pain is rarely dangerous, most people can learn to manage this condition on their own.  HOME CARE INSTRUCTIONS  Watch your back pain for any changes. The following actions may help to lessen any discomfort you are feeling:  · Remain active. It is stressful on your back to sit or  one place for long periods of time. Do not sit, drive, or  one place for more than 30 minutes at a time. Take short walks on even surfaces as soon as you are able. Try to increase the length of time you walk each day.  · Exercise regularly as directed by your health care provider. Exercise helps your back heal faster. It also helps avoid future injury by keeping your muscles strong and flexible.  · Do not stay in bed. Resting more than 1-2 days can delay your recovery.  · Pay attention to your body when you bend and lift. The most comfortable positions are those that put less stress on your recovering back. Always use proper lifting techniques, includin. Bending your knees.  2. Keeping the load close to your body.  3. Avoiding twisting.  · Find a comfortable position to sleep. Use a firm mattress and lie on your side with your knees slightly bent. If you lie on your back, put a pillow under your knees.  · Avoid feeling anxious or stressed. Stress increases muscle tension and can worsen back pain. It is important to recognize when you are anxious or stressed and learn ways to manage it, such as with exercise.  · Take medicines only as directed by your health care provider. Over-the-counter medicines to reduce pain and inflammation are often the most helpful. Your health care provider may prescribe muscle relaxant drugs. These medicines help dull your pain so you can more quickly return to your normal activities and healthy exercise.  · Apply ice to the injured area:  1. Put ice in a plastic bag.  2. Place a towel between  your skin and the bag.  3. Leave the ice on for 20 minutes, 2-3 times a day for the first 2-3 days. After that, ice and heat may be alternated to reduce pain and spasms.  · Maintain a healthy weight. Excess weight puts extra stress on your back and makes it difficult to maintain good posture.  SEEK MEDICAL CARE IF:  · You have pain that is not relieved with rest or medicine.  · You have increasing pain going down into the legs or buttocks.  · You have pain that does not improve in one week.  · You have night pain.  · You lose weight.  · You have a fever or chills.  SEEK IMMEDIATE MEDICAL CARE IF:   · You develop new bowel or bladder control problems.  · You have unusual weakness or numbness in your arms or legs.  · You develop nausea or vomiting.  · You develop abdominal pain.  · You feel faint.     This information is not intended to replace advice given to you by your health care provider. Make sure you discuss any questions you have with your health care provider.     Document Released: 12/18/2006 Document Revised: 01/08/2016 Document Reviewed: 04/21/2015  [employer] Interactive Patient Education ©2016 [employer] Inc.    Dehydration, Adult  Dehydration means your body does not have as much fluid as it needs. Your kidneys, brain, and heart will not work properly without the right amount of fluids and salt.   HOME CARE  · Ask your doctor how to replace body fluid losses (rehydrate).  · Drink enough fluids to keep your pee (urine) clear or pale yellow.  · Drink small amounts of fluids often if you feel sick to your stomach (nauseous) or throw up (vomit).  · Eat like you normally do.  · Avoid:  1. Foods or drinks high in sugar.  2. Bubbly (carbonated) drinks.  3. Juice.  4. Very hot or cold fluids.  5. Drinks with caffeine.  6. Fatty, greasy foods.  7. Alcohol.  8. Tobacco.  9. Eating too much.  10. Gelatin desserts.  · Wash your hands to avoid spreading germs (bacteria, viruses).  · Only take medicine as told by your  doctor.  · Keep all doctor visits as told.  GET HELP RIGHT AWAY IF:   · You cannot drink something without throwing up.  · You get worse even with treatment.  · Your vomit has blood in it or looks greenish.  · Your poop (stool) has blood in it or looks black and tarry.  · You have not peed in 6 to 8 hours.  · You pee a small amount of very dark pee.  · You have a fever.  · You pass out (faint).  · You have belly (abdominal) pain that gets worse or stays in one spot (localizes).  · You have a rash, stiff neck, or bad headache.  · You get easily annoyed, sleepy, or are hard to wake up.  · You feel weak, dizzy, or very thirsty.  MAKE SURE YOU:   · Understand these instructions.  · Will watch your condition.  · Will get help right away if you are not doing well or get worse.     This information is not intended to replace advice given to you by your health care provider. Make sure you discuss any questions you have with your health care provider.     Document Released: 10/14/2010 Document Revised: 03/11/2013 Document Reviewed: 08/06/2012  [employer] Interactive Patient Education ©2016 Elsevier Inc.    Diabetes, Frequently Asked Questions  WHAT IS DIABETES?  Most of the food we eat is turned into glucose (sugar). Our bodies use it for energy. The pancreas makes a hormone called insulin. It helps glucose get into the cells of our bodies. When you have diabetes, your body either does not make enough insulin or cannot use its own insulin as well as it should. This causes sugars to build up in your blood.  WHAT ARE THE SYMPTOMS OF DIABETES?  · Frequent urination.   · Excessive thirst.   · Unexplained weight loss.   · Extreme hunger.   · Blurred vision.   · Tingling or numbness in hands or feet.   · Feeling very tired much of the time.   · Dry, itchy skin.   · Sores that are slow to heal.   · Yeast infections.   WHAT ARE THE TYPES OF DIABETES?  Type 1 Diabetes   · About 10% of affected people have this type.   · Usually occurs  before the age of 30.   · Usually occurs in thin to normal weight people.   Type 2 Diabetes  · About 90% of affected people have this type.   · Usually occurs after the age of 40.   · Usually occurs in overweight people.   · More likely to have:   · A family history of diabetes.   · A history of diabetes during pregnancy (gestational diabetes).   · High blood pressure.   · High cholesterol and triglycerides.   Gestational Diabetes  · Occurs in about 4% of pregnancies.   · Usually goes away after the baby is born.   · More likely to occur in women with:   · Family history of diabetes.   · Previous gestational diabetes.   · Obese.   · Over 25 years old.   WHAT IS PRE-DIABETES?  Pre-diabetes means your blood glucose is higher than normal, but lower than the diabetes range. It also means you are at risk of getting type 2 diabetes and heart disease. If you are told you have pre-diabetes, have your blood glucose checked again in 1 to 2 years.  WHAT IS THE TREATMENT FOR DIABETES?  Treatment is aimed at keeping blood glucose near normal levels at all times. Learning how to manage this yourself is important in treating diabetes. Depending on the type of diabetes you have, your treatment will include one or more of the following:  · Monitoring your blood glucose.   · Meal planning.   · Exercise.   · Oral medicine (pills) or insulin.   CAN DIABETES BE PREVENTED?  With type 1 diabetes, prevention is more difficult, because the triggers that cause it are not yet known.  With type 2 diabetes, prevention is more likely, with lifestyle changes:  · Maintain a healthy weight.   · Eat healthy.   · Exercise.   IS THERE A CURE FOR DIABETES?  No, there is no cure for diabetes. There is a lot of research going on that is looking for a cure, and progress is being made. Diabetes can be treated and controlled. People with diabetes can manage their diabetes and lead normal, active lives.  SHOULD I BE TESTED FOR DIABETES?  If you are at least  45 years old, you should be tested for diabetes. You should be tested again every 3 years. If you are 45 or older and overweight, you may want to get tested more often. If you are younger than 45, overweight, and have one or more of the following risk factors, you should be tested:  · Family history of diabetes.   · Inactive lifestyle.   · High blood pressure.   WHAT ARE SOME OTHER SOURCES FOR INFORMATION ON DIABETES?  The following organizations may help in your search for more information on diabetes:  National Diabetes Education Program (NDEP)  Internet: http://www.ndep.nih.gov/resources  American Diabetes Association  Internet: http://www.diabetes.org   Juvenile Diabetes Foundation International  Internet: http://www.jdf.org  Document Released: 12/20/2004 Document Revised: 03/11/2013 Document Reviewed: 10/15/2010  [employer]® Patient Information ©2013 [employer].          Patient Information     Patient Information    Following emergency treatment: all patient requiring follow-up care must return either to a private physician or a clinic if your condition worsens before you are able to obtain further medical attention, please return to the emergency room.     Billing Information    At [facility], we work to make the billing process streamlined for our patients.  Our Representatives are here to answer any questions you may have regarding your hospital bill.  If you have insurance coverage and have supplied your insurance information to us, we will submit a claim to your insurer on your behalf.  Should you have any questions regarding your bill, we can be reached online or by phone as follows:  Online: You are able pay your bills online or live chat with our representatives about any billing questions you may have. We are here to help Monday - Friday from 8:00am to 7:30pm and 9:00am - 12:00pm on Saturdays.  Please visit [email or web address]  for more information.   Phone:   [phone] or [phone]    Please note that your emergency physician, surgeon, pathologist, radiologist, anesthesiologist, and other specialists are not employed by [facility] and will therefore bill separately for their services.  Please contact them directly for any questions concerning their bills at the numbers below:     Emergency Physician Services:  [phone]  [facility]:  [phone]  [facility]:  [phone]  [facility]:  [phone]    1. Your final bill may vary from the amount quoted upon discharge if all procedures are not complete at that time, or if your doctor has additional procedures of which we are not aware. You will receive an additional bill if you return to the Emergency Department at [facility] for suture removal regardless of the facility of which the sutures were placed.     2. Please arrange for settlement of this account at the emergency registration.    3. All self-pay accounts are due in full at the time of treatment.  If you are unable to meet this obligation then payment is expected within 4-5 days.     4. If you have had radiology studies (CT, X-ray, Ultrasound, MRI), you have received a preliminary result during your emergency department visit. Please contact the radiology department [phone] to receive a copy of your final result. Please discuss the Final result with your primary physician or with the follow up physician provided.     Crisis Hotline:  [place] Crisis Hotline:  3-742-UZVMNBZ or 1-768.565.8531  [place] Crisis Hotline:    1-533.815.3627 or 282-529-3428         ED Discharge Follow Up Questions    1. In order to provide you with very good care, we would like to follow up with a phone call in the next few days.  May we have your permission to contact you?     YES /  NO    2. What is the best phone number to call you? (       )_____-__________    3. What is the best time to call you?      Morning  /   Afternoon  /  Evening                   Patient Signature:  ____________________________________________________________    Date:  ____________________________________________________________      Your appointments     Jan 19, 2017  4:00 PM   FOLLOW UP with Torres Kumar M.D.   [facility] (--)    [address], [address]  [place] [address]   [phone]            Jan 31, 2017  9:00 AM   Follow Up Med Management with Ronny Burnette M.D.   [facility] [address] [place] [address])    [address] [place] [address]  [address]  [place] [address]   [phone]            Mar 01, 2017  9:00 AM   Individual Therapy with [place]ITA StatonSVAISHALI   [facility] ([facility])    [address]  [address]  [place] [address]   [phone]            Mar 15, 2017  9:00 AM   Individual Therapy with [place] StatonSVAISHALI   [facility] ([facility])    [address]  [address]  [place] [address] [phone]            Mar 29, 2017  9:00 AM   Individual Therapy with [place] StatonSVAISHALI   [facility] ([facility])    [address]  [address]  [place] [address]   [phone]            Apr 12, 2017  9:00 AM   Individual Therapy with [place] StatonSVAISHALI   [facility] ([facility])    [address]  [address]  [place] [address] [phone]            May 23, 2017  9:40 AM   FOLLOW UP with Torres Kumar M.D.   [facility] (--)    [address], [address]  [place] [address]   [phone]

## 2017-01-19 ENCOUNTER — OFFICE VISIT (OUTPATIENT)
Dept: CARDIOLOGY | Facility: MEDICAL CENTER | Age: 27
End: 2017-01-19
Payer: MEDICARE

## 2017-01-19 VITALS
OXYGEN SATURATION: 92 % | SYSTOLIC BLOOD PRESSURE: 124 MMHG | BODY MASS INDEX: 47.29 KG/M2 | DIASTOLIC BLOOD PRESSURE: 78 MMHG | HEART RATE: 86 BPM | HEIGHT: 62 IN | WEIGHT: 257 LBS

## 2017-01-19 DIAGNOSIS — R00.0 TACHYCARDIA: ICD-10-CM

## 2017-01-19 DIAGNOSIS — I10 ESSENTIAL HYPERTENSION: ICD-10-CM

## 2017-01-19 PROCEDURE — G8482 FLU IMMUNIZE ORDER/ADMIN: HCPCS | Performed by: INTERNAL MEDICINE

## 2017-01-19 PROCEDURE — G8432 DEP SCR NOT DOC, RNG: HCPCS | Performed by: INTERNAL MEDICINE

## 2017-01-19 PROCEDURE — 1036F TOBACCO NON-USER: CPT | Performed by: INTERNAL MEDICINE

## 2017-01-19 PROCEDURE — 93000 ELECTROCARDIOGRAM COMPLETE: CPT | Performed by: INTERNAL MEDICINE

## 2017-01-19 PROCEDURE — G8419 CALC BMI OUT NRM PARAM NOF/U: HCPCS | Performed by: INTERNAL MEDICINE

## 2017-01-19 PROCEDURE — 99214 OFFICE O/P EST MOD 30 MIN: CPT | Performed by: INTERNAL MEDICINE

## 2017-01-19 NOTE — PROGRESS NOTES
"Subjective:   Kristin Balderrama is a 26 y.o. female who presents today for follow up of tachycardia.      She is pretty sure she does have diagnosis of mitochondrial disease.  Cannot get muscle biopsy because insurance does not cover it.      Hr bouncing around.  Was weird this morning.  It would go fast and then normal then really fast again.  It was weird for her.      Past Medical History   Diagnosis Date   • Scoliosis    • Multiple personality disorder    • Chronic UTI 9/18/2010   • Heart burn    • Pain 08-15-12     back, 7/10   • History of falling    • Sinus tachycardia 10/31/2013   • Urinary incontinence    • Hypertension    • Disorder of thyroid    • Obesity    • Pneumonia 2012   • ASTHMA      when around smoke   • Breath shortness      with tachycardia   • Anginal syndrome      random chest pain especially with tachycardia   • Psychosis    • Arthritis      osteo   • PCOS (polycystic ovarian syndrome)    • Gynecological disorder      PCOS   • Renal disorder      \"kidney disease, stage 1\" nephrologist, Dr. Vallejo   • Arrhythmia      \"sinus tachycardia\", cariologist, Dr. 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   • Sleep apnea      CPAP \"pulmonary doctor took me off mid year 2016\"   • 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CMS-HCC)      Past Surgical History   Procedure Laterality Date   • Neuro dest facet l/s w/ig sngl  4/21/2015     Performed by Reza Tabor at [facility]   • Recovery  1/27/2016     Procedure: CATH LAB EP STUDY WITH SINUS NODE MODIFICATION ABHINAV;  Surgeon: [facility];  Location: SURGERY PRE-POST PROC UNIT [facility];  Service:    • Katie by laparoscopy  8/29/2010     Performed by SHAYY JOHNS at SUR[facility]S   • Lumbar fusion anterior  8/21/2012     Performed by SUSIE SAWANT at [facility]   • Other cardiac surgery  1/2016     cardiac ablation   • Tonsillectomy " "      tonsillectomy   • Bowel stimulator insertion  7/13/2016     Procedure: BOWEL STIMULATOR INSERTION FOR PERMANENT INTERSTIM SACRAL IMPLANT;  Surgeon: Joe Noyola M.D.;  Location: SURGERY [facility]S;  Service:    • Gastroscopy with balloon dilatation N/A 1/4/2017     Procedure: GASTROSCOPY WITH DILATATION;  Surgeon: Torres Vargas M.D.;  Location: SURGERY [facility] ORS;  Service:      Family History   Problem Relation Age of Onset   • Hypertension Mother    • Sleep Apnea Mother    • Heart Disease Mother    • Other Mother      hypothryod   • Hypertension Maternal Uncle    • Heart Disease Maternal Grandmother    • Hypertension Maternal Grandmother    • Other Sister      Narcolepsy;fibromyalsia;bone;nerve   • Genitourinary () Sister      endometriosis     History   Smoking status   • Passive Smoke Exposure - Never Smoker   • Types: Cigarettes   Smokeless tobacco   • Never Used     Allergies   Allergen Reactions   • Cefdinir Shortness of Breath and Itching     RXN=unknown   • Depakote [Divalproex Sodium] Unspecified     Muscle spasms/muscle pain and weakness  RXN=unknown   • Abilify Unspecified     Headaches/muscle twitching  RXN=unknown   • Amitriptyline Unspecified     Headaches  RXN=unknown   • Amoxicillin Rash     RXN=unknown   • Ciprofloxacin Rash     RXN=unknown   • Clindamycin Nausea     Even with food  RXN=unkown   • Doxycycline Vomiting and Nausea     RXN=unknown   • Ees [Erythromycin] Vomiting and Nausea     RXN=unknown   • Flagyl [Metronidazole Hcl] Unspecified     \"eye problems\"  RXN=uknown   • Flomax [Tamsulosin Hydrochloride] Swelling     RXN=unknown   • Metformin Unspecified     Increased lactic acid   RXN=unknown   • Sulfa Drugs Hives and Rash     RXN=since childhood   • Tape Rash     Tears skin off   coban with Tegaderm tape ok  RXN=ongoing   • Vancomycin Itching     Pt becomes flushed in face and chest.   RXN=7/10/16   • Cephalexin [Keflex] Rash     Pt states she gets a rash with this " medication   • Levofloxacin Unspecified     Leg muscle cramps     Outpatient Encounter Prescriptions as of 1/19/2017   Medication Sig Dispense Refill   • cyanocobalamin (VITAMIN B-12) 100 MCG Tab Take 100 mcg by mouth every day.     • oxybutynin SR (DITROPAN-XL) 10 MG CR tablet Take 10 mg by mouth 2 Times a Day.     • cyclobenzaprine (FLEXERIL) 10 MG Tab Take 10 mg by mouth 3 times a day as needed.     • fluoxetine (PROZAC) 10 MG Cap Take 1 Cap by mouth every day. 30 Cap 1   • oxycodone-acetaminophen (PERCOCET-10)  MG Tab Take 1-2 Tabs by mouth 2 Times a Day. Two tabs     • promethazine (PHENERGAN) 25 MG Tab Take 1 Tab by mouth every 6 hours as needed for Nausea/Vomiting. 30 Tab 0   • Cholecalciferol (VITAMIN D3) 84470 UNITS Cap Take 1 Each by mouth every 7 days. 4 Cap 4   • naproxen (NAPROSYN) 500 MG Tab Take 500 mg by mouth 2 times a day as needed (pain).     • albuterol 108 (90 BASE) MCG/ACT Aero Soln inhalation aerosol Inhale 2 Puffs by mouth every 6 hours as needed for Shortness of Breath.     • Coenzyme Q10 (EQL COQ10) 300 MG Cap Take 1 Cap by mouth every day at 6 PM.     • Melatonin 5 MG Tab Take 10 mg by mouth at bedtime as needed (sleep aid).     • sodium bicarbonate 325 MG Tab Take 2 Tabs by mouth 3 times a day.     • ziprasidone (GEODON) 80 MG Cap Take 160 mg by mouth every evening.     • Ivabradine HCl (CORLANOR) 5 MG Tab Take 1 Tab by mouth 2 Times a Day. 60 Tab 6   • Gabapentin, Once-Daily, (GRALISE) 600 MG Tab Take 1 Tab by mouth 3 times a day. Pt reports she is taking 1 tab in a.m. and 2 tabs in p.m.     • omeprazole (PRILOSEC) 20 MG delayed-release capsule Take 20 mg by mouth 2 times a day. takes 1x daily most of time     • ranitidine (ZANTAC) 150 MG Tab Take 150 mg by mouth 2 times a day.     • cefpodoxime (VANTIN) 100 MG tablet Take 1 Tab by mouth 2 times a day. 20 Tab 0   • clindamycin (CLEOCIN) 300 MG Cap Take 1 Cap by mouth 3 times a day. 30 Cap 0   • lactulose 10 GM/15ML Solution Take 15  "mL by mouth 2 times a day as needed. 1 Bottle 3   • cyanocobalamin (HM VITAMIN B12) 500 MCG tablet Take 1,000 mcg by mouth 2 times a day.     • Zinc Oxide 11.3 % Cream Apply 1 Application to affected area(s) 1 time daily as needed (for skin irritations to coccyx).     • levothyroxine (SYNTHROID) 75 MCG Tab Take 75 mcg by mouth Every morning on an empty stomach.       No facility-administered encounter medications on file as of 1/19/2017.     Review of Systems   Cardiovascular: Positive for palpitations.   Psychiatric/Behavioral: Positive for depression.        Objective:   /78 mmHg  Pulse 86  Ht 1.575 m (5' 2\")  Wt 116.574 kg (257 lb)  BMI 46.99 kg/m2  SpO2 92%    Physical Exam   Constitutional: She is oriented to person, place, and time. She appears well-developed and well-nourished. No distress.   obese   HENT:   Head: Normocephalic and atraumatic.   Right Ear: External ear normal.   Left Ear: External ear normal.   Mouth/Throat: Oropharynx is clear and moist.   Eyes: Conjunctivae and EOM are normal. Right eye exhibits no discharge. Left eye exhibits no discharge. No scleral icterus.   Neck: Normal range of motion. Neck supple. No JVD present. No tracheal deviation present. No thyromegaly present.   Cardiovascular: Normal rate, regular rhythm, normal heart sounds and intact distal pulses.  Exam reveals no gallop and no friction rub.    No murmur heard.  Pulmonary/Chest: Effort normal and breath sounds normal. No stridor. No respiratory distress. She has no wheezes. She has no rales.   Abdominal: Soft. She exhibits no distension.   Musculoskeletal: She exhibits no edema or tenderness.   Neurological: She is alert and oriented to person, place, and time. No cranial nerve deficit. Coordination normal.   Skin: Skin is warm and dry. No rash noted. She is not diaphoretic. No erythema. No pallor.   Psychiatric: She has a normal mood and affect. Her behavior is normal. Judgment and thought content normal. "   Vitals reviewed.      Assessment:     1. Tachycardia  EKG   2. Essential hypertension  EKG       Medical Decision Making:  Today's Assessment / Status / Plan:     Yearly echo to follow up for cardiac decline  Ablate and pace at any sign of dysfunction to avoid any compnent of tachycardia induced cardiomyopathy.

## 2017-01-19 NOTE — ED NOTES
Assumed care of pt upon being roomed. Orders recvd and reviewed with erp and pt. Partial labs draw with difficulty-erp aware of same. Report to jamie rn to assist with care

## 2017-01-19 NOTE — DISCHARGE INSTRUCTIONS
Back Pain, Adult  Back pain is very common in adults. The cause of back pain is rarely dangerous and the pain often gets better over time. The cause of your back pain may not be known. Some common causes of back pain include:  · Strain of the muscles or ligaments supporting the spine.  · Wear and tear (degeneration) of the spinal disks.  · Arthritis.  · Direct injury to the back.  For many people, back pain may return. Since back pain is rarely dangerous, most people can learn to manage this condition on their own.  HOME CARE INSTRUCTIONS  Watch your back pain for any changes. The following actions may help to lessen any discomfort you are feeling:  · Remain active. It is stressful on your back to sit or  one place for long periods of time. Do not sit, drive, or  one place for more than 30 minutes at a time. Take short walks on even surfaces as soon as you are able. Try to increase the length of time you walk each day.  · Exercise regularly as directed by your health care provider. Exercise helps your back heal faster. It also helps avoid future injury by keeping your muscles strong and flexible.  · Do not stay in bed. Resting more than 1-2 days can delay your recovery.  · Pay attention to your body when you bend and lift. The most comfortable positions are those that put less stress on your recovering back. Always use proper lifting techniques, includin. Bending your knees.  2. Keeping the load close to your body.  3. Avoiding twisting.  · Find a comfortable position to sleep. Use a firm mattress and lie on your side with your knees slightly bent. If you lie on your back, put a pillow under your knees.  · Avoid feeling anxious or stressed. Stress increases muscle tension and can worsen back pain. It is important to recognize when you are anxious or stressed and learn ways to manage it, such as with exercise.  · Take medicines only as directed by your health care provider. Over-the-counter  medicines to reduce pain and inflammation are often the most helpful. Your health care provider may prescribe muscle relaxant drugs. These medicines help dull your pain so you can more quickly return to your normal activities and healthy exercise.  · Apply ice to the injured area:  1. Put ice in a plastic bag.  2. Place a towel between your skin and the bag.  3. Leave the ice on for 20 minutes, 2-3 times a day for the first 2-3 days. After that, ice and heat may be alternated to reduce pain and spasms.  · Maintain a healthy weight. Excess weight puts extra stress on your back and makes it difficult to maintain good posture.  SEEK MEDICAL CARE IF:  · You have pain that is not relieved with rest or medicine.  · You have increasing pain going down into the legs or buttocks.  · You have pain that does not improve in one week.  · You have night pain.  · You lose weight.  · You have a fever or chills.  SEEK IMMEDIATE MEDICAL CARE IF:   · You develop new bowel or bladder control problems.  · You have unusual weakness or numbness in your arms or legs.  · You develop nausea or vomiting.  · You develop abdominal pain.  · You feel faint.     This information is not intended to replace advice given to you by your health care provider. Make sure you discuss any questions you have with your health care provider.     Document Released: 12/18/2006 Document Revised: 01/08/2016 Document Reviewed: 04/21/2015  [employer] Interactive Patient Education ©2016 [employer]    Dehydration, Adult  Dehydration means your body does not have as much fluid as it needs. Your kidneys, brain, and heart will not work properly without the right amount of fluids and salt.   HOME CARE  · Ask your doctor how to replace body fluid losses (rehydrate).  · Drink enough fluids to keep your pee (urine) clear or pale yellow.  · Drink small amounts of fluids often if you feel sick to your stomach (nauseous) or throw up (vomit).  · Eat like you normally  do.  · Avoid:  1. Foods or drinks high in sugar.  2. Bubbly (carbonated) drinks.  3. Juice.  4. Very hot or cold fluids.  5. Drinks with caffeine.  6. Fatty, greasy foods.  7. Alcohol.  8. Tobacco.  9. Eating too much.  10. Gelatin desserts.  · Wash your hands to avoid spreading germs (bacteria, viruses).  · Only take medicine as told by your doctor.  · Keep all doctor visits as told.  GET HELP RIGHT AWAY IF:   · You cannot drink something without throwing up.  · You get worse even with treatment.  · Your vomit has blood in it or looks greenish.  · Your poop (stool) has blood in it or looks black and tarry.  · You have not peed in 6 to 8 hours.  · You pee a small amount of very dark pee.  · You have a fever.  · You pass out (faint).  · You have belly (abdominal) pain that gets worse or stays in one spot (localizes).  · You have a rash, stiff neck, or bad headache.  · You get easily annoyed, sleepy, or are hard to wake up.  · You feel weak, dizzy, or very thirsty.  MAKE SURE YOU:   · Understand these instructions.  · Will watch your condition.  · Will get help right away if you are not doing well or get worse.     This information is not intended to replace advice given to you by your health care provider. Make sure you discuss any questions you have with your health care provider.     Document Released: 10/14/2010 Document Revised: 03/11/2013 Document Reviewed: 08/06/2012  [employer] Interactive Patient Education ©2016 [employer]    Diabetes, Frequently Asked Questions  WHAT IS DIABETES?  Most of the food we eat is turned into glucose (sugar). Our bodies use it for energy. The pancreas makes a hormone called insulin. It helps glucose get into the cells of our bodies. When you have diabetes, your body either does not make enough insulin or cannot use its own insulin as well as it should. This causes sugars to build up in your blood.  WHAT ARE THE SYMPTOMS OF DIABETES?  · Frequent urination.   · Excessive thirst.    · Unexplained weight loss.   · Extreme hunger.   · Blurred vision.   · Tingling or numbness in hands or feet.   · Feeling very tired much of the time.   · Dry, itchy skin.   · Sores that are slow to heal.   · Yeast infections.   WHAT ARE THE TYPES OF DIABETES?  Type 1 Diabetes   · About 10% of affected people have this type.   · Usually occurs before the age of 30.   · Usually occurs in thin to normal weight people.   Type 2 Diabetes  · About 90% of affected people have this type.   · Usually occurs after the age of 40.   · Usually occurs in overweight people.   · More likely to have:   · A family history of diabetes.   · A history of diabetes during pregnancy (gestational diabetes).   · High blood pressure.   · High cholesterol and triglycerides.   Gestational Diabetes  · Occurs in about 4% of pregnancies.   · Usually goes away after the baby is born.   · More likely to occur in women with:   · Family history of diabetes.   · Previous gestational diabetes.   · Obese.   · Over 25 years old.   WHAT IS PRE-DIABETES?  Pre-diabetes means your blood glucose is higher than normal, but lower than the diabetes range. It also means you are at risk of getting type 2 diabetes and heart disease. If you are told you have pre-diabetes, have your blood glucose checked again in 1 to 2 years.  WHAT IS THE TREATMENT FOR DIABETES?  Treatment is aimed at keeping blood glucose near normal levels at all times. Learning how to manage this yourself is important in treating diabetes. Depending on the type of diabetes you have, your treatment will include one or more of the following:  · Monitoring your blood glucose.   · Meal planning.   · Exercise.   · Oral medicine (pills) or insulin.   CAN DIABETES BE PREVENTED?  With type 1 diabetes, prevention is more difficult, because the triggers that cause it are not yet known.  With type 2 diabetes, prevention is more likely, with lifestyle changes:  · Maintain a healthy weight.   · Eat healthy.    · Exercise.   IS THERE A CURE FOR DIABETES?  No, there is no cure for diabetes. There is a lot of research going on that is looking for a cure, and progress is being made. Diabetes can be treated and controlled. People with diabetes can manage their diabetes and lead normal, active lives.  SHOULD I BE TESTED FOR DIABETES?  If you are at least 45 years old, you should be tested for diabetes. You should be tested again every 3 years. If you are 45 or older and overweight, you may want to get tested more often. If you are younger than 45, overweight, and have one or more of the following risk factors, you should be tested:  · Family history of diabetes.   · Inactive lifestyle.   · High blood pressure.   WHAT ARE SOME OTHER SOURCES FOR INFORMATION ON DIABETES?  The following organizations may help in your search for more information on diabetes:  National Diabetes Education Program (NDEP)  Internet: http://www.ndep.nih.gov/resources  American Diabetes Association  Internet: http://www.diabetes.org   Juvenile Diabetes Foundation International  Internet: http://www.jdf.org  Document Released: 12/20/2004 Document Revised: 03/11/2013 Document Reviewed: 10/15/2010  [employer]® Patient Information ©2013 [id number].

## 2017-01-19 NOTE — ED PROVIDER NOTES
"ED Provider Note    CHIEF COMPLAINT  Chief Complaint   Patient presents with   • Abdominal Pain   • Low Back Pain        HPI  Kristin Balderrama is a 26 y.o. female who presents to the ED complaining of darkening urine, right-sided flank pain, some increasing abdominal pain. Patient states she was diagnosed the urinary tract infection about 4 days ago was started on Macrobid. Patient does have a suprapubic catheter that was placed back in March. Patient states that she seems to be getting worse. So she presented to the ED for evaluation. The patient failed to tell me that the patient was seen by her primary care physician earlier today, did have some lab tests done and was given a prescription for Vantin for apparent sinus infection as well. This was obtained later after the initial evaluation. Patient was describing tach to have fevers up to 101 at home called. Apparently, her nephrologist and spoke with Dr. Sanchez who recommended that she come to the ED for evaluation. Upon arrival patient describes the above symptoms and is here for evaluation. Denies any vomiting, diarrhea, describes tactile fevers and chills, increasing right-sided flank pain, abdominal pain.    REVIEW OF SYSTEMS  See HPI for further details. All other systems are negative.     PAST MEDICAL HISTORY  Past Medical History   Diagnosis Date   • Scoliosis    • Multiple personality disorder    • Chronic UTI 9/18/2010   • Heart burn    • Pain 08-15-12     back, 7/10   • History of falling    • Sinus tachycardia 10/31/2013   • Urinary incontinence    • Hypertension    • Disorder of thyroid    • Obesity    • Pneumonia 2012   • ASTHMA      when around smoke   • Breath shortness      with tachycardia   • Anginal syndrome      random chest pain especially with tachycardia   • Psychosis    • Arthritis      osteo   • PCOS (polycystic ovarian syndrome)    • Gynecological disorder      PCOS   • Renal disorder      \"kidney disease, stage 1\" nephrologistDr. " "[place]   • Arrhythmia      \"sinus tachycardia\", cariologist, Dr. 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   • Sleep apnea      CPAP \"pulmonary doctor took me off mid year 2016\"   • 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CMS-HCC)        FAMILY HISTORY  Family History   Problem Relation Age of Onset   • Hypertension Mother    • Sleep Apnea Mother    • Heart Disease Mother    • Other Mother      hypothryod   • Hypertension Maternal Uncle    • Heart Disease Maternal Grandmother    • Hypertension Maternal Grandmother    • Other Sister      Narcolepsy;fibromyalsia;bone;nerve   • Genitourinary () Sister      endometriosis     Patient's family history has been discussed and is been found to be noncontributory to his present illness  SOCIAL HISTORY  Social History     Social History   • Marital Status: Single     Spouse Name: N/A   • Number of Children: N/A   • Years of Education: N/A     Social History Main Topics   • Smoking status: Passive Smoke Exposure - Never Smoker     Types: Cigarettes   • Smokeless tobacco: Never Used   • Alcohol Use: No   • Drug Use: No   • Sexual Activity: Not Currently     Birth Control/ Protection: Pill     Other Topics Concern   • None     Social History Narrative    ** Merged History Encounter **           Torres Brody M.D.      SURGICAL HISTORY  Past Surgical History   Procedure Laterality Date   • Neuro dest facet l/s w/ig sngl  4/21/2015     Performed by Reza Tabor at [facility]   • Recovery  1/27/2016     Procedure: CATH LAB EP STUDY WITH SINUS NODE MODIFICATION ABHINAV;  Surgeon: [facility];  [place] [facility] [place]  [place]:    • Katie by laparoscopy  8/29/2010     Performed by SHAYY JOHNS at SUR[facility]S   • Lumbar fusion anterior  8/21/2012     Performed by SUSIE SAWANT at SUR[facility]   • Other cardiac surgery  1/2016     " cardiac ablation   • Tonsillectomy       tonsillectomy   • Bowel stimulator insertion  7/13/2016     Procedure: BOWEL STIMULATOR INSERTION FOR PERMANENT INTERSTIM SACRAL IMPLANT;  Surgeon: Joe Noyola M.D.;  Location: SURGERY [facility];  Service:    • Gastroscopy with balloon dilatation N/A 1/4/2017     Procedure: GASTROSCOPY WITH DILATATION;  Surgeon: Torres Vargas M.D.;  Location: SURGERY [facility];  Service:        CURRENT MEDICATIONS  Home Medications     Reviewed by Jose Martinez R.N. (Registered Nurse) on 01/18/17 at 1808  Med List Status: Complete    Medication Last Dose Status    albuterol 108 (90 BASE) MCG/ACT Aero Soln inhalation aerosol 1/14/2017 Active    cefpodoxime (VANTIN) 100 MG tablet  Active    Cholecalciferol (VITAMIN D3) 13797 UNITS Cap 1/14/2017 Active    clindamycin (CLEOCIN) 300 MG Cap Unknown Active    Coenzyme Q10 (EQL COQ10) 300 MG Cap 1/14/2017 Active    cyanocobalamin (HM VITAMIN B12) 500 MCG tablet Unknown Active    cyanocobalamin (VITAMIN B-12) 100 MCG Tab 1/14/2017 Active    cyclobenzaprine (FLEXERIL) 10 MG Tab 1/14/2017 Active    fluoxetine (PROZAC) 10 MG Cap 1/14/2017 Active    Gabapentin, Once-Daily, (GRALISE) 600 MG Tab 1/14/2017 Active    Ivabradine HCl (CORLANOR) 5 MG Tab 1/14/2017 Active    lactulose 10 GM/15ML Solution Unknown Active    levothyroxine (SYNTHROID) 75 MCG Tab 1/14/2017 Active    Melatonin 5 MG Tab 1/14/2017 Active    naproxen (NAPROSYN) 500 MG Tab 1/14/2017 Active    nitrofurantoin monohydr macro (MACROBID) 100 MG Cap 1/14/2017 Active    omeprazole (PRILOSEC) 20 MG delayed-release capsule 1/14/2017 Active    oxybutynin SR (DITROPAN-XL) 10 MG CR tablet 1/14/2017 Active    oxycodone-acetaminophen (PERCOCET-10)  MG Tab 1/14/2017 Active    promethazine (PHENERGAN) 25 MG Tab 1/14/2017 Active    ranitidine (ZANTAC) 150 MG Tab 1/14/2017 Active    sodium bicarbonate 325 MG Tab 1/14/2017 Active    Zinc Oxide 11.3 % Cream Unknown Active    ziprasidone  "(GEODON) 80 MG Cap 1/14/2017 Active                ALLERGIES  Allergies   Allergen Reactions   • Cefdinir Shortness of Breath and Itching     RXN=unknown   • Depakote [Divalproex Sodium] Unspecified     Muscle spasms/muscle pain and weakness  RXN=unknown   • Abilify Unspecified     Headaches/muscle twitching  RXN=unknown   • Amitriptyline Unspecified     Headaches  RXN=unknown   • Amoxicillin Rash     RXN=unknown   • Ciprofloxacin Rash     RXN=unknown   • Clindamycin Nausea     Even with food  RXN=unkown   • Doxycycline Vomiting and Nausea     RXN=unknown   • Ees [Erythromycin] Vomiting and Nausea     RXN=unknown   • Flagyl [Metronidazole Hcl] Unspecified     \"eye problems\"  RXN=uknown   • Flomax [Tamsulosin Hydrochloride] Swelling     RXN=unknown   • Metformin Unspecified     Increased lactic acid   RXN=unknown   • Sulfa Drugs Hives and Rash     RXN=since childhood   • Tape Rash     Tears skin off   coban with Tegaderm tape ok  RXN=ongoing   • Vancomycin Itching     Pt becomes flushed in face and chest.   RXN=7/10/16   • Cephalexin [Keflex] Rash     Pt states she gets a rash with this medication   • Levofloxacin Unspecified     Leg muscle cramps       PHYSICAL EXAM  VITAL SIGNS: /87 mmHg  Pulse 81  Temp(Src) 36.7 °C (98.1 °F)  Resp 18  Ht 1.575 m (5' 2\")  Wt 118.842 kg (262 lb)  BMI 47.91 kg/m2  SpO2 94%   Pulse Oximetry was obtained. It showed a reading of  96%.  I interpreted this as non-hypoxic.     Constitutional: Well developed, Well nourished, No acute distress, Non-toxic appearance.   HENT: Normocephalic, Atraumatic, Bilateral external ears normal, bilateral tympanic membranes normal, Oropharynx moist, No oral exudates, Nose normal.   Eyes: Pupils are equal round and react to light, extraocular motions are intact, conjunctiva is normal, there are no signs of exudate.   Neck: Supple, no cervical lymphadenopathy, no meningeal signs..   Lymphatic: No lymphadenopathy noted.   Cardiovascular: Regular " rate and rhythm without murmurs gallops or rubs.   Thorax & Lungs: Lungs are clear to auscultation bilaterally, there are no wheezes no rales. Chest wall is nontender.  Abdomen: Soft, nontender nondistended. Bowel sounds are present.   Skin: Warm, Dry, No erythema,   Back: No tenderness, mild right-sided CVA tenderness   Musculoskeletal: Good range of motion in all major joints. No tenderness to palpation or major deformities noted. Intact distal pulses, no clubbing, no cyanosis, no edema,   Neurologic: Alert & oriented x 3, Normal motor function, Normal sensory function, No focal deficits noted.   Psychiatric: Affect normal, Judgment normal, Mood normal.       RADIOLOGY/PROCEDURES  Results for orders placed or performed during the hospital encounter of 01/18/17   COMP METABOLIC PANEL   Result Value Ref Range    Sodium 137 135 - 145 mmol/L    Potassium 3.9 3.6 - 5.5 mmol/L    Chloride 105 96 - 112 mmol/L    Co2 23 20 - 33 mmol/L    Anion Gap 9.0 0.0 - 11.9    Glucose 159 (H) 65 - 99 mg/dL    Bun 16 8 - 22 mg/dL    Creatinine 0.73 0.50 - 1.40 mg/dL    Calcium 9.9 8.5 - 10.5 mg/dL    AST(SGOT) 41 12 - 45 U/L    ALT(SGPT) 63 (H) 2 - 50 U/L    Alkaline Phosphatase 78 30 - 99 U/L    Total Bilirubin 0.4 0.1 - 1.5 mg/dL    Albumin 4.6 3.2 - 4.9 g/dL    Total Protein 7.0 6.0 - 8.2 g/dL    Globulin 2.4 1.9 - 3.5 g/dL    A-G Ratio 1.9 g/dL   CBC WITH DIFFERENTIAL   Result Value Ref Range    WBC 6.6 4.8 - 10.8 K/uL    RBC 4.47 4.20 - 5.40 M/uL    Hemoglobin 13.6 12.0 - 16.0 g/dL    Hematocrit 39.5 37.0 - 47.0 %    MCV 88.4 81.4 - 97.8 fL    MCH 30.4 27.0 - 33.0 pg    MCHC 34.4 33.6 - 35.0 g/dL    RDW 41.1 35.9 - 50.0 fL    Platelet Count 215 164 - 446 K/uL    MPV 11.6 9.0 - 12.9 fL    Neutrophils-Polys 51.10 44.00 - 72.00 %    Lymphocytes 38.00 22.00 - 41.00 %    Monocytes 6.40 0.00 - 13.40 %    Eosinophils 3.20 0.00 - 6.90 %    Basophils 0.80 0.00 - 1.80 %    Immature Granulocytes 0.50 0.00 - 0.90 %    Nucleated RBC 0.00 /100  WBC    Neutrophils (Absolute) 3.35 2.00 - 7.15 K/uL    Lymphs (Absolute) 2.49 1.00 - 4.80 K/uL    Monos (Absolute) 0.42 0.00 - 0.85 K/uL    Eos (Absolute) 0.21 0.00 - 0.51 K/uL    Baso (Absolute) 0.05 0.00 - 0.12 K/uL    Immature Granulocytes (abs) 0.03 0.00 - 0.11 K/uL    NRBC (Absolute) 0.00 K/uL   ESTIMATED GFR   Result Value Ref Range    GFR If African American >60 >60 mL/min/1.73 m 2    GFR If Non African American >60 >60 mL/min/1.73 m 2     *Note: Due to a large number of results and/or encounters for the requested time period, some results have not been displayed. A complete set of results can be found in Results Review.     Results for orders placed or performed during the hospital encounter of 01/18/17   LACTIC ACID   Result Value Ref Range    Lactic Acid 2.79 (H) 0.50 - 2.00 mmol/L    Specimen Venous    LACTIC ACID   Result Value Ref Range    Lactic Acid 1.69 0.50 - 2.00 mmol/L    Specimen Venous    COMP METABOLIC PANEL   Result Value Ref Range    Sodium 137 135 - 145 mmol/L    Potassium 3.9 3.6 - 5.5 mmol/L    Chloride 106 96 - 112 mmol/L    Co2 21 20 - 33 mmol/L    Anion Gap 10.0 0.0 - 11.9    Glucose 203 (H) 65 - 99 mg/dL    Bun 14 8 - 22 mg/dL    Creatinine 0.72 0.50 - 1.40 mg/dL    Calcium 9.4 8.4 - 10.2 mg/dL    AST(SGOT) 44 12 - 45 U/L    ALT(SGPT) 65 (H) 2 - 50 U/L    Alkaline Phosphatase 69 30 - 99 U/L    Total Bilirubin 0.4 0.1 - 1.5 mg/dL    Albumin 4.0 3.2 - 4.9 g/dL    Total Protein 6.2 6.0 - 8.2 g/dL    Globulin 2.2 1.9 - 3.5 g/dL    A-G Ratio 1.8 g/dL   ESTIMATED GFR   Result Value Ref Range    GFR If African American >60 >60 mL/min/1.73 m 2    GFR If Non African American >60 >60 mL/min/1.73 m 2   CBC WITH DIFFERENTIAL   Result Value Ref Range    WBC 7.1 4.8 - 10.8 K/uL    RBC 4.09 (L) 4.20 - 5.40 M/uL    Hemoglobin 12.5 12.0 - 16.0 g/dL    Hematocrit 35.4 (L) 37.0 - 47.0 %    MCV 86.6 81.4 - 97.8 fL    MCH 30.6 27.0 - 33.0 pg    MCHC 35.3 (H) 33.6 - 35.0 g/dL    RDW 40.1 35.9 - 50.0 fL     Platelet Count 193 164 - 446 K/uL    MPV 11.3 9.0 - 12.9 fL    Neutrophils-Polys 51.70 44.00 - 72.00 %    Lymphocytes 36.10 22.00 - 41.00 %    Monocytes 8.00 0.00 - 13.40 %    Eosinophils 3.10 0.00 - 6.90 %    Basophils 0.80 0.00 - 1.80 %    Immature Granulocytes 0.30 0.00 - 0.90 %    Nucleated RBC 0.00 /100 WBC    Neutrophils (Absolute) 3.69 2.00 - 7.15 K/uL    Lymphs (Absolute) 2.58 1.00 - 4.80 K/uL    Monos (Absolute) 0.57 0.00 - 0.85 K/uL    Eos (Absolute) 0.22 0.00 - 0.51 K/uL    Baso (Absolute) 0.06 0.00 - 0.12 K/uL    Immature Granulocytes (abs) 0.02 0.00 - 0.11 K/uL    NRBC (Absolute) 0.00 K/uL     *Note: Due to a large number of results and/or encounters for the requested time period, some results have not been displayed. A complete set of results can be found in Results Review.     Culture & Susceptibility      ENTEROCOCCUS FAECALIS      Antibiotic Sensitivity Microscan Unit Status     Ampicillin Sensitive <=2 mcg/mL Final     Daptomycin Sensitive 2 mcg/mL Final     Nitrofurantoin Sensitive <=32 mcg/mL Final     Penicillin Sensitive 8 mcg/mL Final     Tetracycline Sensitive <=4 mcg/mL Final                   COURSE & MEDICAL DECISION MAKING  Pertinent Labs & Imaging studies reviewed. (See chart for details)  Patient presents to ED for evaluation. I did evaluate her and noticed that she had lab tests done just a couple of hours prior to arrival. Renal functions appear normal white counts, normal sugars were slightly elevated. Patient does have an on affect. She states that she is allergic to just about every antibiotic out there, including cephalexin, but she is been prescribed Vantin. She has had Rocephin in the past. At this point with normal laboratory studies initially showed a mildly elevated lactic acid after 2 L bolus of normal saline lactic gases come down. At this point. She does have Enterococcus faecalis that is growing out that span sensitive. Patient is now describing some flank pain. She is  not having any signs that are consistent with a pyelonephritis. She has no fever, no elevated white blood cell count. The patient will be started on the third-generation septal sporran that was ordered by her primary care physician for sinus infection. I have spoken with Dr. Sanchez who does not know this patient and he does remember discussing with the patient on the phone and certainly concerns on the phone would be that of pyelonephritis and worsening infection, but at this point. The patient's white blood cell count is normal. Renal functions are normal. Patient urine is slightly darker. Specific gravity is elevated. This most likely mild dehydration. Patient does have Enterococcus faecalis that is pansensitive. Patient was just given a prescription for Vantin by her primary care physician for an apparent sinus infection. This will certainly cover a pyelonephritis. Recommended for the patient to fill her prescription for the antibiotics. It was noted that her sugars were elevated today at 200 segmented follow up with her primary care physician in one week for recheck and further outpatient evaluation and care.    FINAL IMPRESSION  1. Dehydration    2. Acute right-sided low back pain without sciatica    3. Hyperglycemia           The patient will return for new or worsening symptoms and is stable at the time of discharge.    The patient is referred to a primary physician for blood pressure management, diabetic screening, and for all other preventative health concerns.    DISPOSITION:  Patient will be discharged home in stable condition.    FOLLOW UP:  Torres Brody M.D.  [address] [place] [id number]  [phone]    Schedule an appointment as soon as possible for a visit  this week for recheck and further eval of your hyperglycemia      OUTPATIENT MEDICATIONS:  New Prescriptions    No medications on file         Electronically signed by: Wesley Joseph, 1/18/2017 6:40 PM

## 2017-01-19 NOTE — ED NOTES
Pt rounding-urine spec obtained and sent via collection from tubing using aseptic technique. erp aware, requesting percocet 10/325 for back and hip pain. Orders recvd. For same.

## 2017-01-19 NOTE — ED NOTES
"Called to room with pt c/o feeling \"pop \" at iv site. Sluggish blood return to same. erp aware, ivf stopped at this time. Pt requesting to be d/c. Orders recvd for same. To f/u with pcp  "

## 2017-01-19 NOTE — ED NOTES
Pt bib self with c/o abd and lower back pain that began 3x days ago coupled with N/V and generalized body aches. Pt states her urine turned dark and cloudy yesterday.

## 2017-01-19 NOTE — MR AVS SNAPSHOT
"        Kristin Balderrama   2017 4:00 PM   Office Visit   MRN: [id number]    Department:  [facility]   Dept Phone:  [phone]    Description:  Female : 1990   Provider:  Torres Kumar M.D.           Reason for Visit     Follow-Up           Allergies as of 2017     Allergen Noted Reactions    Cefdinir 2016   Shortness of Breath, Itching    RXN=unknown    Depakote [Divalproex Sodium] 2010   Unspecified    Muscle spasms/muscle pain and weakness  RXN=unknown    Abilify 2013   Unspecified    Headaches/muscle twitching  RXN=unknown    Amitriptyline 10/31/2013   Unspecified    Headaches  RXN=unknown    Amoxicillin 2010   Rash    RXN=unknown    Ciprofloxacin 2009   Rash    RXN=unknown    Clindamycin 2011   Nausea    Even with food  RXN=unkown    Doxycycline 08/15/2012   Vomiting, Nausea    RXN=unknown    Ees [Erythromycin] 2010   Vomiting, Nausea    RXN=unknown    Flagyl [Metronidazole Hcl] 2011   Unspecified    \"eye problems\"  RXN=uknown    Flomax [Tamsulosin Hydrochloride] 2009   Swelling    RXN=unknown    Metformin 2013   Unspecified    Increased lactic acid   RXN=unknown    Sulfa Drugs 2010   Hives, Rash    RXN=since childhood    Tape 08/15/2012   Rash    Tears skin off   coban with Tegaderm tape ok  RXN=ongoing    Vancomycin 07/10/2016   Itching    Pt becomes flushed in face and chest.   RXN=7/10/16    Cephalexin [Keflex] 2017   Rash    Pt states she gets a rash with this medication    Levofloxacin 10/27/2016   Unspecified    Leg muscle cramps      You were diagnosed with     Tachycardia   [989446]       Essential hypertension   [1144340]         Vital Signs     Blood Pressure Pulse Height Weight Body Mass Index Oxygen Saturation    124/78 mmHg 86 1.575 m (5' 2\") 116.574 kg (257 lb) 46.99 kg/m2 92%    Smoking Status                   Passive Smoke Exposure - Never Smoker           Basic Information     Date Of Birth Sex " Race Ethnicity Preferred Language    1990 Female White Non- English      Your appointments     Jan 19, 2017  4:00 PM   FOLLOW UP with oTrres Kumar M.D.   [facility] (--)    [address], [address]  [place] [id number] [phone]            Jan 31, 2017  9:00 AM   Follow Up Med Management with Ronny Burnette M.D.   [facility] [address] [place] [address])    [address]  [address]  [place] [address]   [phone]            Mar 01, 2017  9:00 AM   Individual Therapy with JUANITA StatonSVAISHALI   [facility] ([facility])    [address]  [address]  [place] [id number] [phone]            Mar 15, 2017  9:00 AM   Individual Therapy with JUANITA StatonSVAISHALI   [facility] ([facility])    [address]  [address]  [place] [id number] [phone]            Mar 29, 2017  9:00 AM   Individual Therapy with JUANITA StatonSVAISHALI   [facility] ([facility])    [address]  [address]  [place] [id number] [phone]            Apr 12, 2017  9:00 AM   Individual Therapy with JUANITA StatonSVAISHALI   [facility] ([facility])    [address]  [address]  [place] [id number] [phone]            May 23, 2017  9:40 AM   FOLLOW UP with Torres Kumar M.D.   [facility] (--)    [address]  [place] [id number] [phone]              Problem List              ICD-10-CM Priority Class Noted - Resolved    Chronic UTI N39.0 Low  9/18/2010 - Present    Multiple personality disorder F44.81 Low  9/18/2010 - Present    Neurogenic bladder N31.9 Low  4/2/2011 - Present    Sinus tachycardia R00.0 High  10/31/2013 - Present    Knee pain, right M25.561 Low  2/13/2014 - Present    Anxiety F41.9 Low  12/16/2014 - Present    Fatty liver disease, nonalcoholic K76.0 Low  1/19/2015 - Present    Progressive focal motor weakness M62.81 Low  6/28/2015 - Present    Chronic  "back pain M54.9, G89.29 Low  6/29/2015 - Present    Hypothyroidism E03.9 Low  11/23/2015 - Present    PCOS (polycystic ovarian syndrome) E28.2 Low  11/23/2015 - Present    H/O prior ablation treatment Z98.890   2/10/2016 - Present    Peripheral neuropathy (CMS-HCC) G62.9   3/6/2016 - Present    TONYA on CPAP G47.33   3/7/2016 - Present    Morbidly obese (CMS-HCC) E66.01   3/7/2016 - Present    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   3/7/2016 - Present    Scoliosis M41.9   3/7/2016 - Present    GERD (gastroesophageal reflux disease) K21.9   3/7/2016 - Present    Vitamin D deficiency E55.9   5/21/2016 - Present    Chronic inflammatory arthritis M19.90   5/23/2016 - Present    Right flank pain R10.9   6/6/2016 - Present    Weakness of both lower extremities M62.81   6/22/2016 - Present    Elevated sedimentation rate R70.0   6/27/2016 - Present    Galactorrhea O92.6   7/22/2016 - Present    Psychosis, schizophrenia, simple (HCC) (Chronic) F20.89 Low Chronic 9/29/2016 - Present    Schizophrenia (CMS-HCC) F20.9 Low  10/27/2016 - Present    Paralysis (CMS-HCC) G83.9 High  10/27/2016 - Present    Chronic pain syndrome G89.4 Medium  10/27/2016 - Present    Suprapubic catheter (CMS-HCC) Z93.59 Low  10/27/2016 - Present    Bowel and bladder incontinence R32, R15.9   10/27/2016 - Present    Dysphagia R13.10 Medium  10/27/2016 - Present    Depression F32.9 Low  10/28/2016 - Present    HTN (hypertension) I10 Medium  11/1/2016 - Present    Quadriparesis (CMS-HCC) G82.50 High  11/1/2016 - Present    Hypovitaminosis D E55.9   11/29/2016 - Present    Leg weakness M62.81   1/4/2017 - Present      Health Maintenance        Date Due Completion Dates    IMM HEP A VACCINE (1 of 2 - Standard Series) 10/13/1990 ---    IMM VARICELLA (CHICKENPOX) VACCINE (1 of 2 - 2 Dose Adolescent Series) 10/13/2002 ---    PAP SMEAR 7/22/2019 7/22/2016 (Postponed), 2/19/2013 (N/S)    Override on 7/22/2016: Postponed (per pt was told could \"skip\" 2016)    Override on " 2/19/2013: (N/S) ([facility])    IMM DTaP/Tdap/Td Vaccine (7 - Td) 8/6/2022 8/6/2012, 9/18/2010, 3/3/1994, 5/17/1991, 5/10/1990, 3/23/1990, 1990    COLONOSCOPY 9/25/2023 9/25/2013            Results       Current Immunizations     Dtap Vaccine 3/3/1994, 5/17/1991, 5/10/1990, 3/23/1990, 1990    HIB Vaccine(PEDVAX) 1/11/1991    HPV Quadrivalent Vaccine (GARDASIL) 10/27/2011, 5/27/2011, 3/1/2011    Hepatitis B Vaccine Non-Recombivax (Ped/Adol) 1/28/2004, 10/28/2003, 10/29/1999    Influenza TIV (IM) 9/5/2013, 12/7/2011, 11/7/2011    Influenza Vaccine Adult HD 10/5/2016    MMR Vaccine 3/3/1994, 1/11/1991    OPV - Historical Data 3/3/1994, 5/17/1991, 5/10/1990, 3/23/1990, 1990    Pneumococcal Vaccine (PCV7) Historical Data 11/8/2015    TD Vaccine 9/18/2010  4:45 PM    Tdap Vaccine 8/6/2012      Below and/or attached are the medications your provider expects you to take. Review all of your home medications and newly ordered medications with your provider and/or pharmacist. Follow medication instructions as directed by your provider and/or pharmacist. Please keep your medication list with you and share with your provider. Update the information when medications are discontinued, doses are changed, or new medications (including over-the-counter products) are added; and carry medication information at all times in the event of emergency situations     Allergies:  CEFDINIR - Shortness of Breath,Itching     DEPAKOTE - Unspecified     ABILIFY - Unspecified     AMITRIPTYLINE - Unspecified     AMOXICILLIN - Rash     CIPROFLOXACIN - Rash     CLINDAMYCIN - Nausea     DOXYCYCLINE - Vomiting,Nausea     EES - Vomiting,Nausea     FLAGYL - Unspecified     FLOMAX - Swelling     METFORMIN - Unspecified     SULFA DRUGS - Hives,Rash     TAPE - Rash     VANCOMYCIN - Itching     CEPHALEXIN - Rash     LEVOFLOXACIN - Unspecified               Medications  Valid as of: January 19, 2017 -  3:58 PM    Generic Name Brand Name Tablet  Size Instructions for use    Albuterol Sulfate (Aero Soln) albuterol 108 (90 BASE) MCG/ACT Inhale 2 Puffs by mouth every 6 hours as needed for Shortness of Breath.        Cefpodoxime Proxetil (Tab) VANTIN 100 MG Take 1 Tab by mouth 2 times a day.        Cholecalciferol (Cap) Vitamin D3 36937 UNITS Take 1 Each by mouth every 7 days.        Clindamycin HCl (Cap) CLEOCIN 300 MG Take 1 Cap by mouth 3 times a day.        Coenzyme Q10 (Cap) Coenzyme Q10 300 MG Take 1 Cap by mouth every day at 6 PM.        Cyanocobalamin (Tab) vitamin b12 500 MCG Take 1,000 mcg by mouth 2 times a day.        Cyanocobalamin (Tab) VITAMIN B-12 100 MCG Take 100 mcg by mouth every day.        Cyclobenzaprine HCl (Tab) FLEXERIL 10 MG Take 10 mg by mouth 3 times a day as needed.        FLUoxetine HCl (Cap) PROZAC 10 MG Take 1 Cap by mouth every day.        Gabapentin (Once-Daily) (Tab) Gabapentin (Once-Daily) 600 MG Take 1 Tab by mouth 3 times a day. Pt reports she is taking 1 tab in a.m. and 2 tabs in p.m.        Ivabradine HCl (Tab) CORLANOR 5 MG Take 1 Tab by mouth 2 Times a Day.        Lactulose (Solution) lactulose 10 GM/15ML Take 15 mL by mouth 2 times a day as needed.        Levothyroxine Sodium (Tab) SYNTHROID 75 MCG Take 75 mcg by mouth Every morning on an empty stomach.        Melatonin (Tab) Melatonin 5 MG Take 10 mg by mouth at bedtime as needed (sleep aid).        Naproxen (Tab) NAPROSYN 500 MG Take 500 mg by mouth 2 times a day as needed (pain).        Omeprazole (CAPSULE DELAYED RELEASE) PRILOSEC 20 MG Take 20 mg by mouth 2 times a day. takes 1x daily most of time        Oxybutynin Chloride (TABLET SR 24 HR) DITROPAN-XL 10 MG Take 10 mg by mouth 2 Times a Day.        Oxycodone-Acetaminophen (Tab) PERCOCET-10  MG Take 1-2 Tabs by mouth 2 Times a Day. Two tabs        Promethazine HCl (Tab) PHENERGAN 25 MG Take 1 Tab by mouth every 6 hours as needed for Nausea/Vomiting.        RaNITidine HCl (Tab) ZANTAC 150 MG Take 150 mg by  mouth 2 times a day.        Sodium Bicarbonate (Tab) sodium bicarbonate 325 MG Take 2 Tabs by mouth 3 times a day.        Zinc Oxide (Cream) Zinc Oxide 11.3 % Apply 1 Application to affected area(s) 1 time daily as needed (for skin irritations to coccyx).        Ziprasidone HCl (Cap) GEODON 80 MG Take 160 mg by mouth every evening.        .                 Medicines prescribed today were sent to:     [id number] - [place], [place] - [address] [address]    Phone: [phone] Fax: [phone]    Open 24 Hours?: No      Medication refill instructions:       If your prescription bottle indicates you have medication refills left, it is not necessary to call your provider’s office. Please contact your pharmacy and they will refill your medication.    If your prescription bottle indicates you do not have any refills left, you may request refills at any time through one of the following ways: The online [employer] system (except Urgent Care), by calling your provider’s office, or by asking your pharmacy to contact your provider’s office with a refill request. Medication refills are processed only during regular business hours and may not be available until the next business day. Your provider may request additional information or to have a follow-up visit with you prior to refilling your medication.   *Please Note: Medication refills are assigned a new Rx number when refilled electronically. Your pharmacy may indicate that no refills were authorized even though a new prescription for the same medication is available at the pharmacy. Please request the medicine by name with the pharmacy before contacting your provider for a refill.        Your To Do List     Future Labs/Procedures Complete By Expires    Echocardiogram Comp w/o Cont  3/1/2017 1/19/2018      Other Notes About Your Plan     DME:  [facility] / ph [phone] / fax [phone]              [employer] Access Code: Activation  code not generated  Current MyChart Status: Active

## 2017-01-20 LAB — EKG IMPRESSION: NORMAL

## 2017-01-21 LAB
BACTERIA UR CULT: NORMAL
SIGNIFICANT IND 70042: NORMAL
SITE SITE: NORMAL
SOURCE SOURCE: NORMAL

## 2017-01-22 ENCOUNTER — APPOINTMENT (OUTPATIENT)
Dept: RADIOLOGY | Facility: MEDICAL CENTER | Age: 27
DRG: 876 | End: 2017-01-22
Attending: EMERGENCY MEDICINE
Payer: MEDICARE

## 2017-01-22 ENCOUNTER — RESOLUTE PROFESSIONAL BILLING HOSPITAL PROF FEE (OUTPATIENT)
Dept: HOSPITALIST | Facility: MEDICAL CENTER | Age: 27
End: 2017-01-22
Payer: MEDICARE

## 2017-01-22 ENCOUNTER — HOSPITAL ENCOUNTER (INPATIENT)
Facility: MEDICAL CENTER | Age: 27
LOS: 9 days | DRG: 876 | End: 2017-02-01
Attending: EMERGENCY MEDICINE | Admitting: HOSPITALIST
Payer: MEDICARE

## 2017-01-22 DIAGNOSIS — R20.0 NUMBNESS: ICD-10-CM

## 2017-01-22 DIAGNOSIS — G82.50 QUADRIPARESIS (HCC): ICD-10-CM

## 2017-01-22 DIAGNOSIS — R53.1 WEAKNESS: ICD-10-CM

## 2017-01-22 LAB
ALBUMIN SERPL BCP-MCNC: 4.5 G/DL (ref 3.2–4.9)
ALBUMIN SERPL BCP-MCNC: 4.6 G/DL (ref 3.2–4.9)
ALBUMIN/GLOB SERPL: 1.6 G/DL
ALBUMIN/GLOB SERPL: 1.8 G/DL
ALP SERPL-CCNC: 75 U/L (ref 30–99)
ALP SERPL-CCNC: 81 U/L (ref 30–99)
ALT SERPL-CCNC: 61 U/L (ref 2–50)
ALT SERPL-CCNC: 64 U/L (ref 2–50)
ANION GAP SERPL CALC-SCNC: 10 MMOL/L (ref 0–11.9)
ANION GAP SERPL CALC-SCNC: 9 MMOL/L (ref 0–11.9)
APPEARANCE UR: ABNORMAL
AST SERPL-CCNC: 38 U/L (ref 12–45)
AST SERPL-CCNC: 39 U/L (ref 12–45)
BASOPHILS # BLD AUTO: 0.7 % (ref 0–1.8)
BASOPHILS # BLD AUTO: 0.8 % (ref 0–1.8)
BASOPHILS # BLD: 0.05 K/UL (ref 0–0.12)
BASOPHILS # BLD: 0.05 K/UL (ref 0–0.12)
BILIRUB SERPL-MCNC: 0.3 MG/DL (ref 0.1–1.5)
BILIRUB SERPL-MCNC: 0.4 MG/DL (ref 0.1–1.5)
BUN SERPL-MCNC: 12 MG/DL (ref 8–22)
BUN SERPL-MCNC: 12 MG/DL (ref 8–22)
CALCIUM SERPL-MCNC: 10.1 MG/DL (ref 8.5–10.5)
CALCIUM SERPL-MCNC: 9.8 MG/DL (ref 8.5–10.5)
CHLORIDE SERPL-SCNC: 105 MMOL/L (ref 96–112)
CHLORIDE SERPL-SCNC: 105 MMOL/L (ref 96–112)
CO2 SERPL-SCNC: 22 MMOL/L (ref 20–33)
CO2 SERPL-SCNC: 24 MMOL/L (ref 20–33)
COLOR UR AUTO: YELLOW
CREAT SERPL-MCNC: 0.61 MG/DL (ref 0.5–1.4)
CREAT SERPL-MCNC: 0.69 MG/DL (ref 0.5–1.4)
EOSINOPHIL # BLD AUTO: 0.18 K/UL (ref 0–0.51)
EOSINOPHIL # BLD AUTO: 0.2 K/UL (ref 0–0.51)
EOSINOPHIL NFR BLD: 2.7 % (ref 0–6.9)
EOSINOPHIL NFR BLD: 2.8 % (ref 0–6.9)
ERYTHROCYTE [DISTWIDTH] IN BLOOD BY AUTOMATED COUNT: 40.6 FL (ref 35.9–50)
ERYTHROCYTE [DISTWIDTH] IN BLOOD BY AUTOMATED COUNT: 41.3 FL (ref 35.9–50)
GFR SERPL CREATININE-BSD FRML MDRD: >60 ML/MIN/1.73 M 2
GFR SERPL CREATININE-BSD FRML MDRD: >60 ML/MIN/1.73 M 2
GLOBULIN SER CALC-MCNC: 2.5 G/DL (ref 1.9–3.5)
GLOBULIN SER CALC-MCNC: 2.8 G/DL (ref 1.9–3.5)
GLUCOSE BLD-MCNC: 117 MG/DL (ref 65–99)
GLUCOSE BLD-MCNC: 206 MG/DL (ref 65–99)
GLUCOSE SERPL-MCNC: 108 MG/DL (ref 65–99)
GLUCOSE SERPL-MCNC: 85 MG/DL (ref 65–99)
GLUCOSE UR QL STRIP.AUTO: 250 MG/DL
HCG UR QL: NEGATIVE
HCT VFR BLD AUTO: 41.9 % (ref 37–47)
HCT VFR BLD AUTO: 42.4 % (ref 37–47)
HGB BLD-MCNC: 13.8 G/DL (ref 12–16)
HGB BLD-MCNC: 14 G/DL (ref 12–16)
IMM GRANULOCYTES # BLD AUTO: 0.03 K/UL (ref 0–0.11)
IMM GRANULOCYTES # BLD AUTO: 0.03 K/UL (ref 0–0.11)
IMM GRANULOCYTES NFR BLD AUTO: 0.4 % (ref 0–0.9)
IMM GRANULOCYTES NFR BLD AUTO: 0.5 % (ref 0–0.9)
INR PPP: 0.93 (ref 0.87–1.13)
KETONES UR QL STRIP.AUTO: NEGATIVE MG/DL
LEUKOCYTE ESTERASE UR QL STRIP.AUTO: ABNORMAL
LYMPHOCYTES # BLD AUTO: 2.4 K/UL (ref 1–4.8)
LYMPHOCYTES # BLD AUTO: 2.69 K/UL (ref 1–4.8)
LYMPHOCYTES NFR BLD: 36.6 % (ref 22–41)
LYMPHOCYTES NFR BLD: 38.2 % (ref 22–41)
MCH RBC QN AUTO: 29.2 PG (ref 27–33)
MCH RBC QN AUTO: 29.5 PG (ref 27–33)
MCHC RBC AUTO-ENTMCNC: 32.9 G/DL (ref 33.6–35)
MCHC RBC AUTO-ENTMCNC: 33 G/DL (ref 33.6–35)
MCV RBC AUTO: 88.8 FL (ref 81.4–97.8)
MCV RBC AUTO: 89.3 FL (ref 81.4–97.8)
MONOCYTES # BLD AUTO: 0.47 K/UL (ref 0–0.85)
MONOCYTES # BLD AUTO: 0.52 K/UL (ref 0–0.85)
MONOCYTES NFR BLD AUTO: 7.2 % (ref 0–13.4)
MONOCYTES NFR BLD AUTO: 7.4 % (ref 0–13.4)
NEUTROPHILS # BLD AUTO: 3.42 K/UL (ref 2–7.15)
NEUTROPHILS # BLD AUTO: 3.55 K/UL (ref 2–7.15)
NEUTROPHILS NFR BLD: 50.5 % (ref 44–72)
NEUTROPHILS NFR BLD: 52.2 % (ref 44–72)
NITRITE UR QL STRIP.AUTO: NEGATIVE
NRBC # BLD AUTO: 0 K/UL
NRBC # BLD AUTO: 0 K/UL
NRBC BLD AUTO-RTO: 0 /100 WBC
NRBC BLD AUTO-RTO: 0 /100 WBC
PH UR STRIP.AUTO: 6 [PH]
PLATELET # BLD AUTO: 198 K/UL (ref 164–446)
PLATELET # BLD AUTO: 206 K/UL (ref 164–446)
PMV BLD AUTO: 11.6 FL (ref 9–12.9)
PMV BLD AUTO: 11.8 FL (ref 9–12.9)
POTASSIUM SERPL-SCNC: 4.2 MMOL/L (ref 3.6–5.5)
POTASSIUM SERPL-SCNC: 4.3 MMOL/L (ref 3.6–5.5)
PROT SERPL-MCNC: 7.1 G/DL (ref 6–8.2)
PROT SERPL-MCNC: 7.3 G/DL (ref 6–8.2)
PROT UR QL STRIP: NEGATIVE MG/DL
PROTHROMBIN TIME: 12.7 SEC (ref 12–14.6)
RBC # BLD AUTO: 4.72 M/UL (ref 4.2–5.4)
RBC # BLD AUTO: 4.75 M/UL (ref 4.2–5.4)
RBC UR QL AUTO: ABNORMAL
SODIUM SERPL-SCNC: 136 MMOL/L (ref 135–145)
SODIUM SERPL-SCNC: 139 MMOL/L (ref 135–145)
SP GR UR: >=1.03
TROPONIN I SERPL-MCNC: <0.01 NG/ML (ref 0–0.04)
WBC # BLD AUTO: 6.6 K/UL (ref 4.8–10.8)
WBC # BLD AUTO: 7 K/UL (ref 4.8–10.8)

## 2017-01-22 PROCEDURE — 85610 PROTHROMBIN TIME: CPT

## 2017-01-22 PROCEDURE — 94660 CPAP INITIATION&MGMT: CPT

## 2017-01-22 PROCEDURE — 700112 HCHG RX REV CODE 229: Performed by: HOSPITALIST

## 2017-01-22 PROCEDURE — 71010 DX-CHEST-PORTABLE (1 VIEW): CPT

## 2017-01-22 PROCEDURE — 99220 PR INITIAL OBSERVATION CARE,LEVL III: CPT | Performed by: HOSPITALIST

## 2017-01-22 PROCEDURE — G0378 HOSPITAL OBSERVATION PER HR: HCPCS

## 2017-01-22 PROCEDURE — 700111 HCHG RX REV CODE 636 W/ 250 OVERRIDE (IP): Performed by: EMERGENCY MEDICINE

## 2017-01-22 PROCEDURE — A9270 NON-COVERED ITEM OR SERVICE: HCPCS | Performed by: HOSPITALIST

## 2017-01-22 PROCEDURE — 96365 THER/PROPH/DIAG IV INF INIT: CPT

## 2017-01-22 PROCEDURE — 80053 COMPREHEN METABOLIC PANEL: CPT | Mod: 91

## 2017-01-22 PROCEDURE — 99285 EMERGENCY DEPT VISIT HI MDM: CPT

## 2017-01-22 PROCEDURE — 84484 ASSAY OF TROPONIN QUANT: CPT

## 2017-01-22 PROCEDURE — 96375 TX/PRO/DX INJ NEW DRUG ADDON: CPT

## 2017-01-22 PROCEDURE — 81002 URINALYSIS NONAUTO W/O SCOPE: CPT

## 2017-01-22 PROCEDURE — 700117 HCHG RX CONTRAST REV CODE 255: Performed by: EMERGENCY MEDICINE

## 2017-01-22 PROCEDURE — 93005 ELECTROCARDIOGRAM TRACING: CPT | Performed by: EMERGENCY MEDICINE

## 2017-01-22 PROCEDURE — 700105 HCHG RX REV CODE 258: Performed by: EMERGENCY MEDICINE

## 2017-01-22 PROCEDURE — 96374 THER/PROPH/DIAG INJ IV PUSH: CPT

## 2017-01-22 PROCEDURE — 85025 COMPLETE CBC W/AUTO DIFF WBC: CPT | Mod: 91

## 2017-01-22 PROCEDURE — 96376 TX/PRO/DX INJ SAME DRUG ADON: CPT

## 2017-01-22 PROCEDURE — 81025 URINE PREGNANCY TEST: CPT

## 2017-01-22 PROCEDURE — 70496 CT ANGIOGRAPHY HEAD: CPT

## 2017-01-22 PROCEDURE — 82962 GLUCOSE BLOOD TEST: CPT

## 2017-01-22 PROCEDURE — 96372 THER/PROPH/DIAG INJ SC/IM: CPT

## 2017-01-22 PROCEDURE — 70498 CT ANGIOGRAPHY NECK: CPT

## 2017-01-22 PROCEDURE — 700102 HCHG RX REV CODE 250 W/ 637 OVERRIDE(OP): Performed by: HOSPITALIST

## 2017-01-22 PROCEDURE — 700111 HCHG RX REV CODE 636 W/ 250 OVERRIDE (IP): Performed by: HOSPITALIST

## 2017-01-22 PROCEDURE — 700105 HCHG RX REV CODE 258: Performed by: HOSPITALIST

## 2017-01-22 RX ORDER — PROMETHAZINE HYDROCHLORIDE 25 MG/1
12.5-25 SUPPOSITORY RECTAL EVERY 4 HOURS PRN
Status: DISCONTINUED | OUTPATIENT
Start: 2017-01-22 | End: 2017-02-01 | Stop reason: HOSPADM

## 2017-01-22 RX ORDER — GABAPENTIN 600 MG/1
1-2 TABLET ORAL 3 TIMES DAILY
Status: DISCONTINUED | OUTPATIENT
Start: 2017-01-22 | End: 2017-01-22

## 2017-01-22 RX ORDER — CLINDAMYCIN HYDROCHLORIDE 300 MG/1
300 CAPSULE ORAL 3 TIMES DAILY
Status: ON HOLD | COMMUNITY
Start: 2017-01-02 | End: 2017-02-01

## 2017-01-22 RX ORDER — PROMETHAZINE HYDROCHLORIDE 25 MG/1
25 TABLET ORAL EVERY 6 HOURS PRN
Status: DISCONTINUED | OUTPATIENT
Start: 2017-01-22 | End: 2017-01-22

## 2017-01-22 RX ORDER — LEVOTHYROXINE SODIUM 0.07 MG/1
75 TABLET ORAL
Status: DISCONTINUED | OUTPATIENT
Start: 2017-01-23 | End: 2017-02-01 | Stop reason: HOSPADM

## 2017-01-22 RX ORDER — ONDANSETRON 2 MG/ML
4 INJECTION INTRAMUSCULAR; INTRAVENOUS ONCE
Status: COMPLETED | OUTPATIENT
Start: 2017-01-22 | End: 2017-01-22

## 2017-01-22 RX ORDER — CHOLECALCIFEROL (VITAMIN D3) 125 MCG
1000 CAPSULE ORAL 2 TIMES DAILY
Status: DISCONTINUED | OUTPATIENT
Start: 2017-01-22 | End: 2017-02-01 | Stop reason: HOSPADM

## 2017-01-22 RX ORDER — AMOXICILLIN 250 MG
1 CAPSULE ORAL
Status: DISCONTINUED | OUTPATIENT
Start: 2017-01-22 | End: 2017-02-01 | Stop reason: HOSPADM

## 2017-01-22 RX ORDER — DEXTROSE MONOHYDRATE 25 G/50ML
25 INJECTION, SOLUTION INTRAVENOUS
Status: DISCONTINUED | OUTPATIENT
Start: 2017-01-22 | End: 2017-02-01 | Stop reason: HOSPADM

## 2017-01-22 RX ORDER — OXYBUTYNIN CHLORIDE 10 MG/1
10 TABLET, EXTENDED RELEASE ORAL 2 TIMES DAILY
Status: DISCONTINUED | OUTPATIENT
Start: 2017-01-22 | End: 2017-02-01 | Stop reason: HOSPADM

## 2017-01-22 RX ORDER — ERGOCALCIFEROL 1.25 MG/1
50000 CAPSULE ORAL
COMMUNITY
End: 2017-11-30

## 2017-01-22 RX ORDER — OMEPRAZOLE 20 MG/1
20 CAPSULE, DELAYED RELEASE ORAL 2 TIMES DAILY
Status: DISCONTINUED | OUTPATIENT
Start: 2017-01-22 | End: 2017-02-01 | Stop reason: HOSPADM

## 2017-01-22 RX ORDER — ERGOCALCIFEROL 1.25 MG/1
50000 CAPSULE ORAL
Status: DISCONTINUED | OUTPATIENT
Start: 2017-01-27 | End: 2017-02-01 | Stop reason: HOSPADM

## 2017-01-22 RX ORDER — FAMOTIDINE 20 MG/1
150 TABLET, FILM COATED ORAL 2 TIMES DAILY
Status: DISCONTINUED | OUTPATIENT
Start: 2017-01-22 | End: 2017-01-22

## 2017-01-22 RX ORDER — ONDANSETRON 2 MG/ML
4 INJECTION INTRAMUSCULAR; INTRAVENOUS EVERY 4 HOURS PRN
Status: DISCONTINUED | OUTPATIENT
Start: 2017-01-22 | End: 2017-02-01 | Stop reason: HOSPADM

## 2017-01-22 RX ORDER — SODIUM CHLORIDE 9 MG/ML
INJECTION, SOLUTION INTRAVENOUS CONTINUOUS
Status: DISCONTINUED | OUTPATIENT
Start: 2017-01-22 | End: 2017-02-01 | Stop reason: HOSPADM

## 2017-01-22 RX ORDER — ZIPRASIDONE HYDROCHLORIDE 80 MG/1
160 CAPSULE ORAL EVERY EVENING
Status: DISCONTINUED | OUTPATIENT
Start: 2017-01-22 | End: 2017-02-01 | Stop reason: HOSPADM

## 2017-01-22 RX ORDER — FAMOTIDINE 20 MG/1
20 TABLET, FILM COATED ORAL 2 TIMES DAILY
Status: DISCONTINUED | OUTPATIENT
Start: 2017-01-22 | End: 2017-01-24

## 2017-01-22 RX ORDER — AMOXICILLIN 250 MG
1 CAPSULE ORAL NIGHTLY
Status: DISCONTINUED | OUTPATIENT
Start: 2017-01-22 | End: 2017-02-01 | Stop reason: HOSPADM

## 2017-01-22 RX ORDER — GABAPENTIN 400 MG/1
1200 CAPSULE ORAL EVERY EVENING
Status: DISCONTINUED | OUTPATIENT
Start: 2017-01-22 | End: 2017-02-01 | Stop reason: HOSPADM

## 2017-01-22 RX ORDER — GABAPENTIN 300 MG/1
600 CAPSULE ORAL DAILY
Status: DISCONTINUED | OUTPATIENT
Start: 2017-01-23 | End: 2017-02-01 | Stop reason: HOSPADM

## 2017-01-22 RX ORDER — ACETAMINOPHEN 325 MG/1
650 TABLET ORAL EVERY 6 HOURS PRN
Status: DISCONTINUED | OUTPATIENT
Start: 2017-01-22 | End: 2017-02-01 | Stop reason: HOSPADM

## 2017-01-22 RX ORDER — ONDANSETRON 4 MG/1
4 TABLET, ORALLY DISINTEGRATING ORAL EVERY 4 HOURS PRN
Status: DISCONTINUED | OUTPATIENT
Start: 2017-01-22 | End: 2017-02-01 | Stop reason: HOSPADM

## 2017-01-22 RX ORDER — ENEMA 19; 7 G/133ML; G/133ML
1 ENEMA RECTAL
Status: COMPLETED | OUTPATIENT
Start: 2017-01-22 | End: 2017-01-27

## 2017-01-22 RX ORDER — LACTULOSE 20 G/30ML
30 SOLUTION ORAL
Status: DISCONTINUED | OUTPATIENT
Start: 2017-01-22 | End: 2017-01-25

## 2017-01-22 RX ORDER — LACTULOSE 10 G/15ML
10 SOLUTION ORAL 2 TIMES DAILY PRN
COMMUNITY
End: 2017-07-06

## 2017-01-22 RX ORDER — PROMETHAZINE HYDROCHLORIDE 25 MG/1
12.5-25 TABLET ORAL EVERY 4 HOURS PRN
Status: DISCONTINUED | OUTPATIENT
Start: 2017-01-22 | End: 2017-02-01 | Stop reason: HOSPADM

## 2017-01-22 RX ORDER — KETOROLAC TROMETHAMINE 30 MG/ML
15 INJECTION, SOLUTION INTRAMUSCULAR; INTRAVENOUS EVERY 8 HOURS PRN
Status: COMPLETED | OUTPATIENT
Start: 2017-01-22 | End: 2017-01-23

## 2017-01-22 RX ORDER — OXYCODONE AND ACETAMINOPHEN 10; 325 MG/1; MG/1
1-2 TABLET ORAL EVERY 6 HOURS PRN
Status: DISCONTINUED | OUTPATIENT
Start: 2017-01-22 | End: 2017-01-24

## 2017-01-22 RX ORDER — METHYLPREDNISOLONE SODIUM SUCCINATE 40 MG/ML
1000 INJECTION, POWDER, LYOPHILIZED, FOR SOLUTION INTRAMUSCULAR; INTRAVENOUS DAILY
Status: DISCONTINUED | OUTPATIENT
Start: 2017-01-22 | End: 2017-01-22

## 2017-01-22 RX ORDER — CEFPODOXIME PROXETIL 100 MG/1
100 TABLET, FILM COATED ORAL 2 TIMES DAILY
Status: ON HOLD | COMMUNITY
Start: 2017-01-18 | End: 2017-02-01

## 2017-01-22 RX ORDER — SODIUM CHLORIDE 9 MG/ML
1000 INJECTION, SOLUTION INTRAVENOUS ONCE
Status: COMPLETED | OUTPATIENT
Start: 2017-01-22 | End: 2017-01-22

## 2017-01-22 RX ORDER — DOCUSATE SODIUM 100 MG/1
100 CAPSULE, LIQUID FILLED ORAL EVERY MORNING
Status: DISCONTINUED | OUTPATIENT
Start: 2017-01-22 | End: 2017-02-01 | Stop reason: HOSPADM

## 2017-01-22 RX ORDER — BISACODYL 10 MG
10 SUPPOSITORY, RECTAL RECTAL
Status: DISCONTINUED | OUTPATIENT
Start: 2017-01-22 | End: 2017-02-01 | Stop reason: HOSPADM

## 2017-01-22 RX ORDER — ALBUTEROL SULFATE 90 UG/1
2 AEROSOL, METERED RESPIRATORY (INHALATION) EVERY 6 HOURS PRN
Status: DISCONTINUED | OUTPATIENT
Start: 2017-01-22 | End: 2017-02-01 | Stop reason: HOSPADM

## 2017-01-22 RX ORDER — CYCLOBENZAPRINE HCL 10 MG
10 TABLET ORAL 2 TIMES DAILY
Status: DISCONTINUED | OUTPATIENT
Start: 2017-01-22 | End: 2017-02-01 | Stop reason: HOSPADM

## 2017-01-22 RX ORDER — CHOLECALCIFEROL (VITAMIN D3) 125 MCG
10 CAPSULE ORAL
Status: DISCONTINUED | OUTPATIENT
Start: 2017-01-22 | End: 2017-01-22

## 2017-01-22 RX ORDER — SODIUM BICARBONATE 650 MG/1
650 TABLET ORAL 3 TIMES DAILY
Status: DISCONTINUED | OUTPATIENT
Start: 2017-01-22 | End: 2017-02-01 | Stop reason: HOSPADM

## 2017-01-22 RX ORDER — LABETALOL HYDROCHLORIDE 5 MG/ML
10 INJECTION, SOLUTION INTRAVENOUS EVERY 4 HOURS PRN
Status: DISCONTINUED | OUTPATIENT
Start: 2017-01-22 | End: 2017-02-01 | Stop reason: HOSPADM

## 2017-01-22 RX ORDER — NAPROXEN 500 MG/1
500 TABLET ORAL 2 TIMES DAILY
Status: DISCONTINUED | OUTPATIENT
Start: 2017-01-22 | End: 2017-02-01 | Stop reason: HOSPADM

## 2017-01-22 RX ORDER — FLUOXETINE 10 MG/1
10 CAPSULE ORAL DAILY
Status: DISCONTINUED | OUTPATIENT
Start: 2017-01-23 | End: 2017-02-01 | Stop reason: HOSPADM

## 2017-01-22 RX ADMIN — DOCUSATE SODIUM 100 MG: 100 CAPSULE ORAL at 14:13

## 2017-01-22 RX ADMIN — ONDANSETRON 4 MG: 2 INJECTION, SOLUTION INTRAMUSCULAR; INTRAVENOUS at 08:33

## 2017-01-22 RX ADMIN — INSULIN LISPRO 3 UNITS: 100 INJECTION, SOLUTION INTRAVENOUS; SUBCUTANEOUS at 23:18

## 2017-01-22 RX ADMIN — SODIUM CHLORIDE 1000 ML: 9 INJECTION, SOLUTION INTRAVENOUS at 08:32

## 2017-01-22 RX ADMIN — CYCLOBENZAPRINE HYDROCHLORIDE 10 MG: 10 TABLET, FILM COATED ORAL at 21:39

## 2017-01-22 RX ADMIN — IOHEXOL 100 ML: 350 INJECTION, SOLUTION INTRAVENOUS at 09:04

## 2017-01-22 RX ADMIN — FAMOTIDINE 20 MG: 20 TABLET, FILM COATED ORAL at 21:38

## 2017-01-22 RX ADMIN — Medication 650 MG: at 14:50

## 2017-01-22 RX ADMIN — SODIUM CHLORIDE: 9 INJECTION, SOLUTION INTRAVENOUS at 12:54

## 2017-01-22 RX ADMIN — SODIUM CHLORIDE 1000 MG: 9 INJECTION, SOLUTION INTRAVENOUS at 14:52

## 2017-01-22 RX ADMIN — GABAPENTIN 1200 MG: 400 CAPSULE ORAL at 21:37

## 2017-01-22 RX ADMIN — KETOROLAC TROMETHAMINE 15 MG: 30 INJECTION, SOLUTION INTRAMUSCULAR; INTRAVENOUS at 22:08

## 2017-01-22 RX ADMIN — OXYCODONE HYDROCHLORIDE AND ACETAMINOPHEN 2 TABLET: 10; 325 TABLET ORAL at 18:13

## 2017-01-22 RX ADMIN — KETOROLAC TROMETHAMINE 15 MG: 30 INJECTION, SOLUTION INTRAMUSCULAR; INTRAVENOUS at 14:17

## 2017-01-22 RX ADMIN — Medication 1 TABLET: at 21:40

## 2017-01-22 RX ADMIN — ZIPRASIDONE HCL 160 MG: 40 CAPSULE ORAL at 21:39

## 2017-01-22 RX ADMIN — CYANOCOBALAMIN TAB 500 MCG 1000 MCG: 500 TAB at 21:38

## 2017-01-22 RX ADMIN — Medication 650 MG: at 21:40

## 2017-01-22 RX ADMIN — NAPROXEN 500 MG: 500 TABLET ORAL at 21:39

## 2017-01-22 RX ADMIN — OXYBUTYNIN CHLORIDE 10 MG: 10 TABLET, FILM COATED, EXTENDED RELEASE ORAL at 21:39

## 2017-01-22 RX ADMIN — OMEPRAZOLE 20 MG: 20 CAPSULE, DELAYED RELEASE ORAL at 21:41

## 2017-01-22 RX ADMIN — OXYCODONE HYDROCHLORIDE AND ACETAMINOPHEN 2 TABLET: 10; 325 TABLET ORAL at 11:49

## 2017-01-22 RX ADMIN — HYDROMORPHONE HYDROCHLORIDE 1 MG: 1 INJECTION, SOLUTION INTRAMUSCULAR; INTRAVENOUS; SUBCUTANEOUS at 08:33

## 2017-01-22 ASSESSMENT — LIFESTYLE VARIABLES
ALCOHOL_USE: NO
EVER_SMOKED: NEVER
EVER_SMOKED: NEVER

## 2017-01-22 ASSESSMENT — PAIN SCALES - GENERAL
PAINLEVEL_OUTOF10: 9
PAINLEVEL_OUTOF10: 8
PAINLEVEL_OUTOF10: 9

## 2017-01-22 ASSESSMENT — COPD QUESTIONNAIRES
DURING THE PAST 4 WEEKS HOW MUCH DID YOU FEEL SHORT OF BREATH: SOME OF THE TIME
HAVE YOU SMOKED AT LEAST 100 CIGARETTES IN YOUR ENTIRE LIFE: NO/DON'T KNOW
DO YOU EVER COUGH UP ANY MUCUS OR PHLEGM?: NO/ONLY WITH OCCASIONAL COLDS OR INFECTIONS
COPD SCREENING SCORE: 1

## 2017-01-22 NOTE — IP AVS SNAPSHOT
uShare Access Code: Activation code not generated  Current uShare Status: Active    [employer]  A secure, online tool to manage your health information     [employer]’s uShare® is a secure, online tool that connects you to your personalized health information from the privacy of your home -- day or night - making it very easy for you to manage your healthcare. Once the activation process is completed, you can even access your medical information using the uShare rocio, which is available for free in the Apple Rocio store or Google Play store.     uShare provides the following levels of access (as shown below):   My Chart Features   [facility] Primary Care Doctor [facility]  Specialists [facility]  Urgent  Care Non-[facility]  Primary Care  Doctor   Email your healthcare team securely and privately 24/7 X X X X   Manage appointments: schedule your next appointment; view details of past/upcoming appointments X      Request prescription refills. X      View recent personal medical records, including lab and immunizations X X X X   View health record, including health history, allergies, medications X X X X   Read reports about your outpatient visits, procedures, consult and ER notes X X X X   See your discharge summary, which is a recap of your hospital and/or ER visit that includes your diagnosis, lab results, and care plan. X X       How to register for uShare:  1. Go to  [email or web address].  2. Click on the Sign Up Now box, which takes you to the New Member Sign Up page. You will need to provide the following information:  a. Enter your uShare Access Code exactly as it appears at the top of this page. (You will not need to use this code after you’ve completed the sign-up process. If you do not sign up before the expiration date, you must request a new code.)   b. Enter your date of birth.   c. Enter your home email address.   d. Click Submit, and follow the next screen’s instructions.  3. Create a uShare ID. This will  be your [employer] login ID and cannot be changed, so think of one that is secure and easy to remember.  4. Create a [employer] password. You can change your password at any time.  5. Enter your Password Reset Question and Answer. This can be used at a later time if you forget your password.   6. Enter your e-mail address. This allows you to receive e-mail notifications when new information is available in [employer].  7. Click Sign Up. You can now view your health information.    For assistance activating your [employer] account, call [phone]

## 2017-01-22 NOTE — IP AVS SNAPSHOT
2/1/2017          Kristin Balderrama  [address] [facility] [place] [address]    Dear Kristin:    [facility] wants to ensure your discharge home is safe and you or your loved ones have had all your questions answered regarding your care after you leave the hospital.    You may receive a telephone call within two days of your discharge.  This call is to make certain you understand your discharge instructions as well as ensure we provided you with the best care possible during your stay with us.     The call will only last approximately 3-5 minutes and will be done by a nurse.    Once again, we want to ensure your discharge home is safe and that you have a clear understanding of any next steps in your care.  If you have any questions or concerns, please do not hesitate to contact us, we are here for you.  Thank you for choosing [facility] for your healthcare needs.    Sincerely,    Lorenzo Zuleta    [facility]

## 2017-01-22 NOTE — ED NOTES
Report received, assuming care.  Lab at the bedside to draw blood, pt has limited IV access, US IV attempted by night shift w/o success.  ERP aware.

## 2017-01-22 NOTE — ED PROVIDER NOTES
ED Provider Note    CHIEF COMPLAINT  Chief Complaint   Patient presents with   • Numbness     facial numbness radiating to legs. Pts grandmother states she was recently seen here for this many times. pt states she cant walk due to the weakness.    • Headache     started this morning        HPI  Kristin Balderrama is a 26 y.o. female who presents, with numbness, right arm, since 4 AM this morning. Gradual onset numbness in her right face,, right arm right leg. Also weakness in both legs both arms, weakness more profound.. Evidently she has been dozing off and on, on my, 1st noticed this for at 3:45 AM. However cannot be sure what time she fell asleep, prior to that. So she cannot tell exactly when the beginning of this was. However probably sometime after midnight.., Numbness. Right arm right leg. No fever no chills no nausea no vomiting slight headache. She had this problem multiple times in the past, this is probably the 6th episode. Evidently she is going to [facility] , neuromuscular clinic for diagnosis of this problem. Also sees a neurologist, local neurologist  Admission here, 9 months ago with the following discharge summary:Admission Diagnoses: Weakness of both lower extremities    Discharge Diagnoses:    Active Problems:    Weakness of both lower extremities  Conversion disorder  Insomnia  Hypothyroidism  GERD  UTI  Chronic pain syndrome  Obesity  Presence of suprapubic catheter  Fungal infection involving the skin surrounding the suprapubic catheter  Narcotic seeking behavior noted during the hospital stay  Reported autoimmune disorder per patient  History sleep apnea  History polycystic ovarian syndrome  History asthma  History arrhythmias  History latent TB  Arthritis  Urinary bladder disorder requiring placement of suprapubic catheter at [facility]  Multiple personality disorder  History psychiatric disorder  Scoliosis    REVIEW OF SYSTEMS  See HPI for further details.  "History of intermittent numbness unilateral History of scoliosis, personality disorder, sinus tachycardia urinary incontinence hypertension obesity and pneumonia psychosis arthritis we will disorder sleep apnea Denies other G.I., G.U.. endrocine, cardiovascular, respriatory or neurological problems. All other systems are negative.     PAST MEDICAL HISTORY  Past Medical History   Diagnosis Date   • Scoliosis    • Multiple personality disorder    • Chronic UTI 9/18/2010   • Heart burn    • Pain 08-15-12     back, 7/10   • History of falling    • Sinus tachycardia 10/31/2013   • Urinary incontinence    • Hypertension    • Disorder of thyroid    • Obesity    • Pneumonia 2012   • ASTHMA      when around smoke   • Breath shortness      with tachycardia   • Anginal syndrome      random chest pain especially with tachycardia   • Psychosis    • Arthritis      osteo   • PCOS (polycystic ovarian syndrome)    • Gynecological disorder      PCOS   • Renal disorder      \"kidney disease, stage 1\" nephrologist, Dr. Vallejo   • Arrhythmia      \"sinus tachycardia\", cariologist, Dr. 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   • Sleep apnea      CPAP \"pulmonary doctor took me off mid year 2016\"   • 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CMS-HCC)        FAMILY HISTORY  Family History   Problem Relation Age of Onset   • Hypertension Mother    • Sleep Apnea Mother    • Heart Disease Mother    • Other Mother      hypothryod   • Hypertension Maternal Uncle    • Heart Disease Maternal Grandmother    • Hypertension Maternal Grandmother    • Other Sister      Narcolepsy;fibromyalsia;bone;nerve   • Genitourinary () Sister      endometriosis       SOCIAL HISTORY  Social History     Social History   • Marital Status: Single     Spouse Name: N/A   • Number of Children: N/A   • Years of Education: N/A     Social History Main Topics   • Smoking status: Passive Smoke " Exposure - Never Smoker     Types: Cigarettes   • Smokeless tobacco: Never Used   • Alcohol Use: No   • Drug Use: No   • Sexual Activity: Not Currently     Birth Control/ Protection: Pill     Other Topics Concern   • Not on file     Social History Narrative    ** Merged History Encounter **            SURGICAL HISTORY  Past Surgical History   Procedure Laterality Date   • Neuro dest facet l/s w/ig sngl  4/21/2015     Performed by Reza Tabor at [facility]   • Recovery  1/27/2016     Procedure: CATH LAB EP STUDY WITH SINUS NODE MODIFICATION ABHINAV;  Surgeon: [facility];  [place] SURG[place] [facility] [facility];  Service:    • Katie by laparoscopy  8/29/2010     Performed by SHAYY JOHNS at SURGERY [facility]S   • Lumbar fusion anterior  8/21/2012     Performed by SUSIE SAWANT at SURGERY [facility]S   • Other cardiac surgery  1/2016     cardiac ablation   • Tonsillectomy       tonsillectomy   • Bowel stimulator insertion  7/13/2016     Procedure: BOWEL STIMULATOR INSERTION FOR PERMANENT INTERSTIM SACRAL IMPLANT;  Surgeon: Joe Noyola M.D.;  Location: SURGERY [facility];  Service:    • Gastroscopy with balloon dilatation N/A 1/4/2017     Procedure: GASTROSCOPY WITH DILATATION;  Surgeon: Torres Vargas M.D.;  Location: SURGERY [facility];  Service:        CURRENT MEDICATIONS  Home Medications     **Home medications have not yet been reviewed for this encounter**          ALLERGIES  Allergies   Allergen Reactions   • Cefdinir Shortness of Breath and Itching     RXN=unknown   • Depakote [Divalproex Sodium] Unspecified     Muscle spasms/muscle pain and weakness  RXN=unknown   • Abilify Unspecified     Headaches/muscle twitching  RXN=unknown   • Amitriptyline Unspecified     Headaches  RXN=unknown   • Amoxicillin Rash     RXN=unknown   • Ciprofloxacin Rash     RXN=unknown   • Clindamycin Nausea     Even with food  RXN=unkown   • Doxycycline Vomiting and Nausea      "RXN=unknown   • Ees [Erythromycin] Vomiting and Nausea     RXN=unknown   • Flagyl [Metronidazole Hcl] Unspecified     \"eye problems\"  RXN=uknown   • Flomax [Tamsulosin Hydrochloride] Swelling     RXN=unknown   • Metformin Unspecified     Increased lactic acid   RXN=unknown   • Sulfa Drugs Hives and Rash     RXN=since childhood   • Tape Rash     Tears skin off   coban with Tegaderm tape ok  RXN=ongoing   • Vancomycin Itching     Pt becomes flushed in face and chest.   RXN=7/10/16   • Cephalexin [Keflex] Rash     Pt states she gets a rash with this medication   • Levofloxacin Unspecified     Leg muscle cramps       PHYSICAL EXAM  VITAL SIGNS: /111 mmHg  Pulse 108  Temp(Src) 36.7 °C (98.1 °F)  Resp 18  Wt 116.574 kg (257 lb)  SpO2 98%  Constitutional: Well developed, Well nourished, markedly obese No acute distress, Non-toxic appearance.   HENT: Normocephalic, Atraumatic, Bilateral external ears normal, Oropharynx moist, No oral exudates, Nose normal.   Eyes: PERRL, EOMI, Conjunctiva normal, No discharge.   Neck: Normal range of motion, No tenderness, Supple, No stridor.   Lymphatic: No lymphadenopathy noted.   Cardiovascular: Normal heart rate, Normal rhythm, No murmurs, No rubs, No gallops.   Thorax & Lungs: Normal breath sounds, No respiratory distress, No wheezing, No chest tenderness.   Abdomen:  No tenderness, no guarding no rigidity and the abdomen is soft.  No masses, No pulsatile masses.  Skin: Warm, Dry, No erythema, No rash.   Back: No tenderness, No CVA tenderness.   Extremities: Intact distal pulses, No edema, No tenderness, No cyanosis, No clubbing.   Musculoskeletal: Good range of motion in all major joints. No tenderness to palpation or major deformities noted.   Neurologic: Alert & oriented x 3, unable to lift her legs off the bed., Weak  strength both hands., decreased sensory, right face right arm right leg, No focal deficits noted.   Psychiatric: Affect is very strange, strange " judgment.  RADIOLOGY/PROCEDURES  CT-CTA HEAD WITH & W/O-POST PROCESS   Final Result      CT angiogram of the [place] within normal limits.      CT-CTA NECK WITH & W/O-POST PROCESSING   Final Result      CT angiogram of the neck within normal limits.      DX-CHEST-PORTABLE (1 VIEW)   Final Result      No acute cardiopulmonary disease evident.            COURSE & MEDICAL DECISION MAKING  Pertinent Labs & Imaging studies reviewed. (See chart for details) troponin normal, electrolytes normal renal function, liver function unremarkable and Y Normal hematocrit normal platelet count is normal        She comes in today with right facial numbness, now extending down her right side, right arm right leg. Intermittent weakness in both legs,  strength weakness on both sides. Unable to lift her legs off of the bed. Long history of similar problems in the past. He's had extensive workups. Being seen at the clinic at [facility] for this problem.  He has had extensive neuroimaging for this problem in the past.    He does not appear to be a candidate for thrombolytics as time of onset is still uncertain.  FINAL IMPRESSION  1.   1. Numbness    2. Weakness        2.   3.     Disposition  Hospitalist to admit, neurology consult  Electronically signed by: Max Can, 1/22/2017 5:58 AM

## 2017-01-22 NOTE — PROGRESS NOTES
Patient c/o 9/10 pain and states the medication she was given in ER is not controlling the pain. MD paged.

## 2017-01-22 NOTE — ED NOTES
Pt to room via wheelchair. Report same as triage. Pt here frequently for the same.   Changed into gown, connected to monitor, chart up for eval.

## 2017-01-22 NOTE — ED NOTES
Chief Complaint   Patient presents with   • Numbness     facial numbness radiating to legs. Pts grandmother states she was recently seen here for this many times. pt states she cant walk due to the weakness.    • Headache     started this morning

## 2017-01-22 NOTE — ED NOTES
Multiple RNs attempted IV access, including ultrasound guided and EJ with no success. ERP notified.

## 2017-01-22 NOTE — ED NOTES
"Pt states she is unable to have a MRI because she has a \"pacemaker\" in my back.  ERP notified, order cancelled.    "

## 2017-01-22 NOTE — IP AVS SNAPSHOT
" <p align=\"LEFT\"><IMG SRC=\"//EMRWB/blob$/Images/[email or web address]\" alt=\"Image\" WIDTH=\"50%\" HEIGHT=\"200\" BORDER=\"\"></p>                   Name:Kristin Balderrama  Medical Record Number:[id number]  CSN: [id number]    YOB: 1990   Age: 26 y.o.  Sex: female  HT:1.575 m (5' 2.01\") WT: 116.574 kg (257 lb)          Admit Date: 1/22/2017     Discharge Date:   Today's Date: 2/1/2017  Attending Doctor:  Bolivar Hackett M.D.                  Allergies:  Cefdinir; Depakote; Abilify; Amitriptyline; Amoxicillin; Ciprofloxacin; Clindamycin; Doxycycline; Ees; Flagyl; Flomax; Metformin; Sulfa drugs; Tape; Vancomycin; Cephalexin; and Levofloxacin          Your appointments     Feb 24, 2017  9:40 AM   Established Patient with Torres Brody M.D.   [facility] [address]    [address]  [address]  [place] [address]   [phone]           You will be receiving a confirmation call a few days before your appointment from our automated call confirmation system.            Mar 01, 2017  9:00 AM   Individual Therapy with Blanca Brantley L.C.S.W.   [facility]    [address]  [address]  [place] [id number] [phone]            Mar 01, 2017 12:15 PM   ECHO with ECHO [facility] EXAM 12   ECHOCARDIOLOGY [facility] ([facility])    [address]  [place] [address]   [phone]           No prep            Mar 15, 2017  9:00 AM   Individual Therapy with Blanca Brantley L.C.S.W.   [facility])    [address]  [address]  [place] [id number] [phone]            Mar 17, 2017  8:30 AM   Follow Up Med Management with Ronny Burnette M.D.   [facility] [address]    [address]  [address]  [place] [address]   [phone]            Mar 29, 2017  9:00 AM   Individual Therapy with Blanca Brantley L.C.S.W.   [facility] ([facility])    [address]  [address]  [facility] [address] [phone]            Apr 12, 2017  9:00 AM   Individual " Therapy with Blanca Brantley L.C.S.W.   [facility]    [address]  [address]  [place] [id number] [phone]            Jul 19, 2017  8:40 AM   FOLLOW UP with Torres Kumar M.D.   [facility] (--)    [address]  [place] [id number] [phone]              Follow-up Information     1. Schedule an appointment as soon as possible for a visit with Torres Brody M.D..    Specialty:  Internal Medicine    Why:  Follow-up appointment    Contact information    [address]  [address]  [place] [address] [phone]           Medication List      Take these Medications        Instructions    albuterol 108 (90 BASE) MCG/ACT Aers inhalation aerosol    Inhale 2 Puffs by mouth every 6 hours as needed for Shortness of Breath.   Dose:  2 Puff       cyclobenzaprine 10 MG Tabs   Commonly known as:  FLEXERIL    Take 10 mg by mouth 2 Times a Day. Indications: Muscle Spasm   Dose:  10 mg       fluoxetine 10 MG Caps   Commonly known as:  PROZAC    Take 1 Cap by mouth every day.   Dose:  10 mg       HM VITAMIN B12 500 MCG tablet   Generic drug:  cyanocobalamin    Take 1,000 mcg by mouth 2 times a day.   Dose:  1000 mcg       ivabradine 5 MG Tabs tablet   Commonly known as:  CORLANOR    Take 1 Tab by mouth 2 Times a Day.   Dose:  1 Tab       lactulose 10 GM/15ML Soln    Take 10 g by mouth 2 times a day.   Dose:  10 g       levothyroxine 75 MCG Tabs   Commonly known as:  SYNTHROID    Take 75 mcg by mouth Every morning on an empty stomach.   Dose:  75 mcg       naproxen 500 MG Tabs   Commonly known as:  NAPROSYN    Take 500 mg by mouth 2 Times a Day.   Dose:  500 mg       omeprazole 20 MG delayed-release capsule   Commonly known as:  PRILOSEC    Take 20 mg by mouth 2 times a day.   Dose:  20 mg       oxybutynin SR 10 MG CR tablet   Commonly known as:  DITROPAN-XL    Take 10 mg by mouth 2 Times a Day.   Dose:  10 mg       oxycodone-acetaminophen  MG  Tabs   Commonly known as:  PERCOCET-10    Take 2 Tabs by mouth 2 Times a Day. Two tabs  Indications: Pain   Dose:  2 Tab       promethazine 25 MG Tabs   Commonly known as:  PHENERGAN    Take 1 Tab by mouth every 6 hours as needed for Nausea/Vomiting.   Dose:  25 mg       ranitidine 150 MG Tabs   Commonly known as:  ZANTAC    Take 150 mg by mouth 2 times a day.   Dose:  150 mg       sodium bicarbonate 325 MG Tabs    Take 2 Tabs by mouth 3 times a day.   Dose:  650 mg       vitamin D (Ergocalciferol) 33921 UNITS Caps capsule   Commonly known as:  DRISDOL    Take 50,000 Units by mouth every 7 days. On Fri   Dose:  09512 Units       Zinc Oxide 11.3 % Crea    Apply 1 Application to affected area(s) 1 time daily as needed (for skin irritations to coccyx).   Dose:  1 Application       ziprasidone 80 MG Caps   Commonly known as:  GEODON    Take 160 mg by mouth every evening.   Dose:  160 mg

## 2017-01-22 NOTE — IP AVS SNAPSHOT
" After Visit Summary                                                                                                                  Name:Kristin Balderrama  Medical Record Number:[id number]  CSN: [id number]    YOB: 1990   Age: 26 y.o.  Sex: female  HT:1.575 m (5' 2.01\") WT: 116.574 kg (257 lb)          Admit Date: 1/22/2017     Discharge Date:   Today's Date: 2/1/2017  Attending Doctor:  Bolivar Hackett M.D.                  Allergies:  Cefdinir; Depakote; Abilify; Amitriptyline; Amoxicillin; Ciprofloxacin; Clindamycin; Doxycycline; Ees; Flagyl; Flomax; Metformin; Sulfa drugs; Tape; Vancomycin; Cephalexin; and Levofloxacin            Discharge Instructions       Discharge Instructions    Discharged to home by car with relative. Discharged via wheelchair, hospital escort: Yes.  Special equipment needed: Not Applicable and Walker    Be sure to schedule a follow-up appointment with your primary care doctor or any specialists as instructed.     Discharge Plan:   Diet Plan: Discussed  Activity Level: Discussed  Confirmed Follow up Appointment: Patient to Call and Schedule Appointment  Confirmed Symptoms Management: Discussed  Medication Reconciliation Updated: Yes  Pneumococcal Vaccine Given - only chart on this line when given: Given (See MAR)  Influenza Vaccine Indication: Not indicated: Previously immunized this influenza season and > 8 years of age    I understand that a diet low in cholesterol, fat, and sodium is recommended for good health. Unless I have been given specific instructions below for another diet, I accept this instruction as my diet prescription.   Other diet: diabetic, dysphagia 2 diet    Special Instructions: None    · Is patient discharged on Warfarin / Coumadin?   No     · Is patient Post Blood Transfusion?  No    Depression / Suicide Risk    As you are discharged from this [facility] facility, it is important to learn how to keep safe from harming yourself.    Recognize the " warning signs:  · Abrupt changes in personality, positive or negative- including increase in energy   · Giving away possessions  · Change in eating patterns- significant weight changes-  positive or negative  · Change in sleeping patterns- unable to sleep or sleeping all the time   · Unwillingness or inability to communicate  · Depression  · Unusual sadness, discouragement and loneliness  · Talk of wanting to die  · Neglect of personal appearance   · Rebelliousness- reckless behavior  · Withdrawal from people/activities they love  · Confusion- inability to concentrate     If you or a loved one observes any of these behaviors or has concerns about self-harm, here's what you can do:  · Talk about it- your feelings and reasons for harming yourself  · Remove any means that you might use to hurt yourself (examples: pills, rope, extension cords, firearm)  · Get professional help from the community (Mental Health, Substance Abuse, psychological counseling)  · Do not be alone:Call your Safe Contact- someone whom you trust who will be there for you.  · Call your local CRISIS HOTLINE 176-9923 or 394-998-8118  · Call your local Children's Mobile Crisis Response Team [place] (228) 603-1184 or www.Notorious  · Call the toll free National Suicide Prevention Hotlines   · National Suicide Prevention Lifeline 355-692-KJZJ (0599)  · National Hope Line Network 800-SUICIDE (661-4343)    Dysphagia Diet Level 2, Mechanically Altered  The dysphagia level 2 diet includes foods that are blended, chopped, ground, or mashed so they are easier to chew and swallow. The foods are soft, moist, and can be chopped into ¼-inch chunks (such as pancakes, pasta, and bananas).  In order to be on this diet, you must be able to chew. This diet helps you transition between the pureed textures of the dysphagia level 1 diet to more solid textures. This diet is helpful for people with mild to moderate swallowing difficulties. It reduces the risk  of food getting caught in the windpipe, trachea, or lungs.   You may need help or supervision during meals while following this diet so that you eat safely. You will be on this diet until your health care provider advances the texture of your diet.   WHAT DO I NEED TO KNOW ABOUT THIS DIET?  Foods  · You may eat foods that are soft and moist.  · You may need to use a , whisk, or masher to soften some of your foods.  · You can moisten foods with gravies, sauces, vegetable or fruit juice, milk, half and half, or water when blending, mashing, or grinding your foods to the right consistency.  · If you were on the dysphagia level 1 diet, you may still eat any of the foods included in that diet.  · Avoid foods that are dry, hard, sticky, chewy, coarse, and crunchy. Also avoid large cuts of food.  · Take small bites. Each bite should contain ¼ inch or less of food.  Liquids  · Avoid liquids with seeds and chunks.  · Thicken liquids, if instructed by your health care provider. Your health care provider will tell you the consistency to which you should thicken your liquids for safe swallowing. To thicken a liquid, use a commercial thickener or a thickening food (such as rice cereal or potato flakes). Ask your health care provider for specific recommendations on thickeners.  See your dietitian or health care provider regularly for help with your dietary changes.  WHAT FOODS CAN I EAT?  Grains  Store-bought soft breads that do not have nuts or seeds. Pancakes, sweet rolls, [place]vian pastries, and [place]vorian toast that have been moistened with syrup or sauce to form a slurry when blended. Well-cooked pasta, noodles, and bread dressing. Well-cooked noodles and pasta in sauce. Moist macaroni and cheese. Soft dumplings or spaetzle with gravy or butter. Cooked cereals (including oatmeal). Low-texture dry cereals, such as rice puff, corn, or wheat-flake cereals, with milk (if thin liquids are not allowed, make sure all of the milk is  absorbed by the cereal before eating it).  Vegetables  Very soft, well-cooked vegetables in pieces less than ½ inch in size. Cooked potatoes that are moist, not crispy, and with sauce.  Fruits  Canned or cooked fruits that are soft or moist and do not have skin or seeds. Fresh, soft bananas. Fruit juices with a small amount of pulp (if thin liquids are allowed). Gelatin or plain gelatin with canned fruit, except pineapple.  Meat and Other Protein Sources  Tender, moist meats, poultry, or fish cooked with gravy or sauce and cubed to ¼-inch bites or smaller. Ground meat. Moist meatball or meatloaf. Fish without bones. Moist casseroles without rice. Tuna, egg, or meat salad without chunks or hard-to-chew vegetables, such as celery and onions. Smooth quiche without large chunks. Scrambled, poached, or soft-cooked eggs with butter, margarine, sauce, or gravy. Tofu. Well-cooked, moistened and mashed beans, peas, baked beans, and other legumes. Casseroles without rice (such as tuna noodle casserole or soft moist meat lasagna).  Dairy  Cream cheese. Yogurt. Cottage cheese. Ask your health care provider if milk is allowed.  Sweets/Desserts  Pudding. Custard. Soft fruit pies with crust on the bottom only. Crisps and cobblers without seeds or nuts and with soft crusts. Soft, moist cakes. Icing. Pre-gelled cookies. Soft, moist cookies dunked in milk, coffee, or another liquid. Jelly. Soft, smooth chocolate bars that are easily chewed. Jams and preserves without seeds. Ask your health care provider whether you can have frozen desserts.  Fats and Oils  Butter. Margarine. Cream for cereal, depending on liquid consistency allowed. Gravy. Cream sauces. Mayonnaise. Salad dressings. Cream cheese. Cheese spreads, plain or with soft fruits or vegetables added. Sour cream. Sour cream dips with soft fruits or vegetables added. Whipped toppings.  Other  Sauces and salsas that have soft chunks that are about ½ inch or smaller.  The items  listed above may not be a complete list of recommended foods or beverages. Contact your dietitian for more options.  WHAT FOODS ARE NOT RECOMMENDED?  Grains  All breads not listed in the recommended list. Breads that are hard or have nuts or seeds. Coarse cereals. Cereals that have nuts, seeds, dried fruits, or coconut. Rice. Corn.  Vegetables  Whole, raw, frozen, or dried vegetables. Tough, fibrous, chewy, or stringy cooked vegetables, such as celery, peas, broccoli, cabbage, [place] sprouts, and asparagus. Potato skins. Potato and other vegetable chips. Fried or Finnish-fried potatoes. Cooked corn and peas.  Fruits  Whole raw, frozen, or dried fruits, including coconut. Pineapple. Fruits with seeds.  Meat and Other Protein Sources  Dry, tough meats, such as cabezas, sausage, and hot dogs. Cheese slices and cubes. Peanut butter. Hard boiled or fried eggs. Nuts. Seeds. Pizza. Sandwiches. Dry casseroles or casseroles with rice or large chunks.  Dairy  Yogurt with nuts, seeds, or large chunks.  Sweets/Desserts  Coarse, hard, chewy, or sticky desserts. Any dessert with nuts, seeds, coconut, pineapple, or dried fruit. Ask your health care provider whether you can have frozen desserts.  Fats and Oils  Avoid fats with chunky, large textures, such as those with nuts or fruits.  Other  Soups and casseroles with large chunks.  The items listed above may not be a complete list of foods and beverages to avoid. Contact your dietitian for more information.     This information is not intended to replace advice given to you by your health care provider. Make sure you discuss any questions you have with your health care provider.     Document Released: 12/18/2006 Document Revised: 01/08/2016 Document Reviewed: 12/01/2014  [employer] Interactive Patient Education ©2016 [employer]    Diets for Diabetes, Food Labeling  Look at food labels to help you decide how much of a product you can eat. You will want to check the amount of total  "carbohydrate in a serving to see how the food fits into your meal plan. In the list of ingredients, the ingredient present in the largest amount by weight must be listed first, followed by the other ingredients in descending order.  STANDARD OF IDENTITY  Most products have a list of ingredients. However, foods that the Food and Drug Administration (FDA) has given a standard of identity do not need a list of ingredients. A standard of identity means that a food must contain certain ingredients if it is called a particular name. Examples are mayonnaise, peanut butter, ketchup, jelly, and cheese.  LABELING TERMS  There are many terms found on food labels. Some of these terms have specific definitions. Some terms are regulated by the FDA, and the FDA has clearly specified how they can be used. Others are not regulated or well-defined and can be misleading and confusing.  SPECIFICALLY DEFINED TERMS  Nutritive Sweetener.  · A sweetener that contains calories,such as table sugar or honey.  Nonnutritive Sweetener.  · A sweetener with few or no calories,such as saccharin, aspartame, sucralose, and cyclamate.  LABELING TERMS REGULATED BY THE FDA  Free.  · The product contains only a tiny or small amount of fat, cholesterol, sodium, sugar, or calories. For example, a \"fat-free\" product will contain less than 0.5 g of fat per serving.  Low.  · A food described as \"low\" in fat, saturated fat, cholesterol, sodium, or calories could be eaten fairly often without exceeding dietary guidelines. For example, \"low in fat\" means no more than 3 g of fat per serving.  Lean.  · \"Lean\" and \"extra lean\" are U.S. Department of Agriculture (USDA) terms for use on meat and poultry products. \"Lean\" means the product contains less than 10 g of fat, 4 g of saturated fat, and 95 mg of cholesterol per serving. \"Lean\" is not as low in fat as a product labeled \"low.\"  Extra Lean.  · \"Extra lean\" means the product contains less than 5 g of fat, 2 g of " "saturated fat, and 95 mg of cholesterol per serving. While \"extra lean\" has less fat than \"lean,\" it is still higher in fat than a product labeled \"low.\"  Reduced, Less, Fewer.  · A diet product that contains 25% less of a nutrient or calories than the regular version. For example, hot dogs might be labeled \"25% less fat than our regular hot dogs.\"  Light/Lite.  · A diet product that contains  fewer calories or ½ the fat of the original. For example, \"light in sodium\" means a product with ½ the usual sodium.  More.  · One serving contains at least 10% more of the daily value of a vitamin, mineral, or fiber than usual.  Good Source Of.  · One serving contains 10% to 19% of the daily value for a particular vitamin, mineral, or fiber.  Excellent Source Of.  · One serving contains 20% or more of the daily value for a particular nutrient. Other terms used might be \"high in\" or \"rich in.\"  Enriched or Fortified.  · The product contains added vitamins, minerals, or protein. Nutrition labeling must be used on enriched or fortified foods.  Imitation.  · The product has been altered so that it is lower in protein, vitamins, or minerals than the usual food,such as imitation peanut butter.  Total Fat.  · The number listed is the total of all fat found in a serving of the product. Under total fat, food labels must list saturated fat and trans fat, which are associated with raising bad cholesterol and an increased risk of heart blood vessel disease.  Saturated Fat.  · Mainly fats from animal-based sources. Some examples are red meat, cheese, cream, whole milk, and coconut oil.  Trans Fat.  · Found in some fried snack foods, packaged foods, and fried restaurant foods. It is recommended you eat as close to 0 g of trans fat as possible, since it raises bad cholesterol and lowers good cholesterol.  Polyunsaturated and Monounsaturated Fats.  · More healthful fats. These fats are from plant sources.  Total Carbohydrate.  · The number " "of carbohydrate grams in a serving of the product. Under total carbohydrate are listed the other carbohydrate sources, such as dietary fiber and sugars.  Dietary Fiber.  · A carbohydrate from plant sources.  Sugars.  · Sugars listed on the label contain all naturally occurring sugars as well as added sugars.  LABELING TERMS NOT REGULATED BY THE FDA  Sugarless.  · Table sugar (sucrose) has not been added. However, the  may use another form of sugar in place of sucrose to alex the product. For example, sugar alcohols are used to alex foods. Sugar alcohols are a form of sugar but are not table sugar. If a product contains sugar alcohols in place of sucrose, it can still be labeled \"sugarless.\"  Low Salt, Salt-Free, Unsalted, No Salt, No Salt Added, Without Added Salt.  · Food that is usually processed with salt has been made without salt. However, the food may contain sodium-containing additives, such as preservatives, leavening agents, or flavorings.  Natural.  · This term has no legal meaning.  Organic.  · Foods that are certified as organic have been inspected and approved by the USDA to ensure they are produced without pesticides, fertilizers containing synthetic ingredients, bioengineering, or ionizing radiation.  Document Released: 12/20/2004 Document Revised: 03/11/2013 Document Reviewed: 07/08/2010  ExitCare® Patient Information ©2014 [employer].      Your appointments     Feb 24, 2017  9:40 AM   Established Patient with Torres Brody M.D.   [facility] [address]    [address]  [address]  [place] [address]   [phone]           You will be receiving a confirmation call a few days before your appointment from our automated call confirmation system.            Mar 01, 2017  9:00 AM   Individual Therapy with Blanca Brantley L.C.S.W.   [facility]    [address]  [address]  [place] [id number] [phone]            Mar 01, 2017 12:15 " PM   ECHO with ECHO [facility] [place] [facility] EXAM 12   ECHOCARDIOLOGY [facility] ([facility])    [address]  [place] [address]   [phone]           No prep            Mar 15, 2017  9:00 AM   Individual Therapy with Blanca Brantley L.C.S.W.   [facility]    [address]  [address]  [place] [address] [phone]            Mar 17, 2017  8:30 AM   Follow Up Med Management with Ronny Burnette M.D.   [facility] [address] [place] [address] ([facility])    [address]  [address]  [place] [address]   [phone]            Mar 29, 2017  9:00 AM   Individual Therapy with Blanca Brantley L.C.S.W.   [facility]    [address]  [address]  [place] [address] [phone]            Apr 12, 2017  9:00 AM   Individual Therapy with Blanca Brantley L.C.S.W.   [facility]    [address]  [address]  [place] [address] [phone]            Jul 19, 2017  8:40 AM   FOLLOW UP with Torres Kumar M.D.   [facility] (--)    [address] [place] [address] [address]  [place] [address] [phone]              Follow-up Information     1. Schedule an appointment as soon as possible for a visit with Torres Brody M.D..    Specialty:  Internal Medicine    Why:  Follow-up appointment    Contact information    [address]  [address]  [place] [address] [phone]           Discharge Medication Instructions:    Below are the medications your physician expects you to take upon discharge:    Review all your home medications and newly ordered medications with your doctor and/or pharmacist. Follow medication instructions as directed by your doctor and/or pharmacist.    Please keep your medication list with you and share with your physician.               Medication List      CONTINUE taking these medications        Instructions    albuterol 108 (90 BASE) MCG/ACT Aers inhalation aerosol    Inhale 2 Puffs by mouth every 6 hours as needed for Shortness of  Breath.   Dose:  2 Puff       cyclobenzaprine 10 MG Tabs   Last time this was given:  10 mg on 2/1/2017  7:38 AM   Commonly known as:  FLEXERIL    Take 10 mg by mouth 2 Times a Day. Indications: Muscle Spasm   Dose:  10 mg       fluoxetine 10 MG Caps   Last time this was given:  10 mg on 2/1/2017  7:38 AM   Commonly known as:  PROZAC    Take 1 Cap by mouth every day.   Dose:  10 mg       HM VITAMIN B12 500 MCG tablet   Last time this was given:  1,000 mcg on 2/1/2017  7:38 AM   Generic drug:  cyanocobalamin    Take 1,000 mcg by mouth 2 times a day.   Dose:  1000 mcg       ivabradine 5 MG Tabs tablet   Last time this was given:  5 mg on 2/1/2017  7:40 AM   Commonly known as:  CORLANOR    Take 1 Tab by mouth 2 Times a Day.   Dose:  1 Tab       lactulose 10 GM/15ML Soln   Last time this was given:  30 mL on 2/1/2017  7:38 AM    Take 10 g by mouth 2 times a day.   Dose:  10 g       levothyroxine 75 MCG Tabs   Last time this was given:  75 mcg on 2/1/2017  7:00 AM   Commonly known as:  SYNTHROID    Take 75 mcg by mouth Every morning on an empty stomach.   Dose:  75 mcg       naproxen 500 MG Tabs   Last time this was given:  500 mg on 2/1/2017  7:38 AM   Commonly known as:  NAPROSYN    Take 500 mg by mouth 2 Times a Day.   Dose:  500 mg       omeprazole 20 MG delayed-release capsule   Last time this was given:  20 mg on 2/1/2017  7:40 AM   Commonly known as:  PRILOSEC    Take 20 mg by mouth 2 times a day.   Dose:  20 mg       oxybutynin SR 10 MG CR tablet   Last time this was given:  10 mg on 2/1/2017  7:38 AM   Commonly known as:  DITROPAN-XL    Take 10 mg by mouth 2 Times a Day.   Dose:  10 mg       oxycodone-acetaminophen  MG Tabs   Last time this was given:  2 Tabs on 1/24/2017  6:00 AM   Commonly known as:  PERCOCET-10    Take 2 Tabs by mouth 2 Times a Day. Two tabs  Indications: Pain   Dose:  2 Tab       promethazine 25 MG Tabs   Last time this was given:  25 mg on 1/25/2017  5:08 PM   Commonly known as:   PHENERGAN    Take 1 Tab by mouth every 6 hours as needed for Nausea/Vomiting.   Dose:  25 mg       ranitidine 150 MG Tabs   Commonly known as:  ZANTAC    Take 150 mg by mouth 2 times a day.   Dose:  150 mg       sodium bicarbonate 325 MG Tabs   Last time this was given:  650 mg on 2/1/2017  7:42 AM    Take 2 Tabs by mouth 3 times a day.   Dose:  650 mg       vitamin D (Ergocalciferol) 26286 UNITS Caps capsule   Last time this was given:  50,000 Units on 1/27/2017  8:14 AM   Commonly known as:  DRISDOL    Take 50,000 Units by mouth every 7 days. On Fri   Dose:  49381 Units       Zinc Oxide 11.3 % Crea    Apply 1 Application to affected area(s) 1 time daily as needed (for skin irritations to coccyx).   Dose:  1 Application       ziprasidone 80 MG Caps   Last time this was given:  160 mg on 1/31/2017  7:37 PM   Commonly known as:  GEODON    Take 160 mg by mouth every evening.   Dose:  160 mg         STOP taking these medications     cefpodoxime 100 MG tablet   Commonly known as:  VANTIN       clindamycin 300 MG Caps   Commonly known as:  CLEOCIN       EQL COQ10 300 MG Caps   Generic drug:  Coenzyme Q10       GRALISE 600 MG Tabs   Generic drug:  Gabapentin (Once-Daily)       Melatonin 5 MG Tabs               Instructions           Diet / Nutrition:    Follow any diet instructions given to you by your doctor or the dietician, including how much salt (sodium) you are allowed each day.    If you are overweight, talk to your doctor about a weight reduction plan.    Activity:    Remain physically active following your doctor's instructions about exercise and activity.    Rest often.     Any time you become even a little tired or short of breath, SIT DOWN and rest.    Worsening Symptoms:    Report any of the following signs and symptoms to the doctor's office immediately:    *Pain of jaw, arm, or neck  *Chest pain not relieved by medication                               *Dizziness or loss of consciousness  *Difficulty  breathing even when at rest   *More tired than usual                                       *Bleeding drainage or swelling of surgical site  *Swelling of feet, ankles, legs or stomach                 *Fever (>100ºF)  *Pink or blood tinged sputum  *Weight gain (3lbs/day or 5lbs /week)           *Shock from internal defibrillator (if applicable)  *Palpitations or irregular heartbeats                *Cool and/or numb extremities    Stroke Awareness    Common Risk Factors for Stroke include:    Age  Atrial Fibrillation  Carotid Artery Stenosis  Diabetes Mellitus  Excessive alcohol consumption  High blood pressure  Overweight   Physical inactivity  Smoking    Warning signs and symptoms of a stroke include:    *Sudden numbness or weakness of the face, arm or leg (especially on one side of the body).  *Sudden confusion, trouble speaking or understanding.  *Sudden trouble seeing in one or both eyes.  *Sudden trouble walking, dizziness, loss of balance or coordination.Sudden severe headache with no known cause.    It is very important to get treatment quickly when a stroke occurs. If you experience any of the above warning signs, call 911 immediately.                   Disclaimer         Quit Smoking / Tobacco Use:    I understand the use of any tobacco products increases my chance of suffering from future heart disease or stroke and could cause other illnesses which may shorten my life. Quitting the use of tobacco products is the single most important thing I can do to improve my health. For further information on smoking / tobacco cessation call a Toll Free Quit Line at 1-906.435.7774 (*National Cancer [place]) or 1-463.843.7399 (American Lung Association) or you can access the web based program at www.lungusa.org.    [place] Tobacco Users Help Line:  (189) 883-5711       Toll Free: 1-325.713.6625  Quit Tobacco Program [facility] [place] [phone]    Crisis Hotline:    [place] Crisis Hotline:   0-292-BOKHNSQ or 1-512.222.6940    [place] Crisis Hotline:    1-976.537.5848 or 965-782-8365    Discharge Survey:   Thank you for choosing [facility]. We hope we did everything we could to make your hospital stay a pleasant one. You may be receiving a phone survey and we would appreciate your time and participation in answering the questions. Your input is very valuable to us in our efforts to improve our service to our patients and their families.        My signature on this form indicates that:    1. I have reviewed and understand the above information.  2. My questions regarding this information have been answered to my satisfaction.  3. I have formulated a plan with my discharge nurse to obtain my prescribed medications for home.                  Disclaimer         __________________________________                     __________       ________                       Patient Signature                                                 Date                    Time

## 2017-01-22 NOTE — H&P
CHIEF COMPLAINT:  Right-sided numbness and right-sided weakness and left lower   extremity weakness.    HISTORY OF PRESENT ILLNESS:  The patient is a 26-year-old female with   suspected history of mitochondrial disorder who has been under the care of Dr. Fox and has also been at [facility] with plans to undergo muscle biopsy   later this week.  She reports that she has chronic right-sided numbness;   however, over the past couple of days, numbness has become more severe.  She   has also noted weakness in her right upper and right lower extremity and left   lower extremity since last night.  She is also complaining of headache that   started this morning.  She denies any dyspnea or chest pain.  She complains of   nausea.  No vomiting.  No diarrhea.  No melena or rectal bleeding.  She has a   chronic suprapubic catheter with good output.  She has not noted any   hematuria.  She presented to the emergency room for further evaluation.    REVIEW OF SYSTEMS:  As above, otherwise reviewed and negative.    PAST MEDICAL HISTORY:  Significant for:  1.  History of PCOS.  2.  Fibromyalgia.  3.  Chronic pain syndrome.  4.  History of suspected gastroparesis.  5.  History of dysphagia.  6.  Depression, anxiety and history of psychosis.  7.  Urinary retention status post suprapubic catheter placements.  8.  History of latent TB.  9.  History of supraventricular tachycardia.    PAST SURGICAL HISTORY:  Cholecystectomy, lumbar fusion, cardiac ablation,   tonsillectomy, bowel stimulator insertion, gastroscopy with balloon dilation.    SOCIAL HISTORY:  She does not smoke.  No alcohol or illicit drug use.    FAMILY HISTORY:  Reviewed.  Not pertinent to the presenting problem.    HOME MEDICATIONS:  Albuterol inhaler as needed.  She was recently treated with   Dilantin and clindamycin for sinusitis.  B12 supplements 1000 mcg b.i.d.,   Flexeril 10 mg b.i.d., Prozac 10 mg daily, Gralise 600 mg at a.m. and 1200 mg   at p.m., Corlanor 5  mg b.i.d., lactulose as needed, Synthroid 75 mcg daily,   melatonin 10 mg at bedtime, naproxen 500 mg daily, Prilosec 200 mg b.i.d.,   Ditropan XL 10 mg 3 times a day, Percocet 2 tablets b.i.d., Phenergan as   needed, ranitidine 150 mg b.i.d., sodium bicarb 650 mg 3 times a day, vitamin   D 50,000 units once a week, zinc oxide as needed, Geodon 160 mg every evening.    PHYSICAL EXAMINATION:  GENERAL:  She is alert, oriented x3.  VITAL SIGNS:  Temperature is 36.6, pulse is 82, respiratory rate is 21, blood   pressure was 465/111, pulse oximetry is 100%.  HEAD AND NECK:  Pupils equal.  Supple neck.  No jugular venous distention.    Oropharynx is clear.  No cervical lymphadenopathy.  HEART:  Regular rate and rhythm.  Normal S1, S2.  No murmurs, rubs or gallops.  LUNGS:  Clear with symmetric air entry bilaterally.  No chest wall tenderness.  ABDOMEN:  Soft, nontender.  Bowel sounds are positive.  No hepatosplenomegaly.    She has a suprapubic catheter in place.  EXTREMITIES:  No edema, no clubbing, no cyanosis.  NEUROLOGIC:  She has weakness in her right upper extremity, right lower   extremity and left lower extremity.  Weakness in the lower extremities is 1/5,   weakness in her right upper extremity is 3+/5.  She has some _____ during the   exam.  She reports decreased sensation to light touch in her right upper and   right lower extremity.  SKIN:  No rash.    DIAGNOSTICS:  White blood cell count is 6.6, hemoglobin is 13.8, hematocrit   41.9, platelet count 198.  Sodium 136, potassium 4.2, chloride 105, bicarb 22,   glucose 108, BUN 12, creatinine 0.61.  AST 38, ALT 61, alkaline phosphatase   81, total bilirubin is 0.3.  INR 0.93.    IMAGING:  CT angiogram of the head revealed CT angiogram of the [place]   [place] within normal limits.  CT angiogram of the neck revealed CT angiogram   of the neck within normal limits.    ASSESSMENT AND PLAN:  1.  Numbness and weakness.  The etiology of her symptoms is not entirely    clear.  Neurology consultation has been obtained and case discussed with Dr. Fox.  He reports that she has responded well to pulse dose steroids in the   past and is recommending pulse dose Solu-Medrol, which has been ordered.    There might also be a functional component based on my examination.  The   patient has also been evaluated at tertiary care center and there was a   question of possible mitochondrial disease.  She is set up to have a muscle   biopsy on Tuesday.  Depending on her course, we will decide on whether this   can be done as an inpatient.  We will ask for speech, physical and   occupational therapy evaluations.  2.  Chronic pain syndrome.  We will continue with her gabapentin, Percocet and   Flexeril.  We will avoid intravenous narcotics.  3.  History of depression and psychosis.  We will continue with her home   medications of Prozac and Geodon.  4.  Gastroesophageal reflux disease.  We will continue with her Zantac and   Prilosec.  5.  History of urinary retention, status post suprapubic catheter.  6.  History of tachycardia.  We will continue with her home dose of   Ivabradine.  7.  We will start on Lovenox for deep vein thrombosis prophylaxis.    Plan of care reviewed with the patient and her grandmother at her bedside and   their questions answered.       ____________________________________     MD AJ VALDEZ / NTS    DD:  01/22/2017 12:11:36  DT:  01/22/2017 12:50:18    D#:  [id number]  Job#:  [id number]

## 2017-01-22 NOTE — PROGRESS NOTES
Report received, pt care assumed. Pt assessment complete. Pt aaox4, no signs of distress noted at this time. POC discussed with pt and verbalizes no questions. Pt denies any additional needs at this time. Bed in lowest position and locked. Pt educated on fall risk and verbalized understanding, call light and personal belongings within reach, will continue to monitor.

## 2017-01-22 NOTE — ED NOTES
"Med rec updated and complete  Allergies reviewed  Pt states \"I was on CLINDAMYCIN 300MG I had an allergie to it and stopped it 4 days after I started on 1/2/2017\".    "

## 2017-01-22 NOTE — DISCHARGE PLANNING
Care Transition Team Assessment    Information Source  Orientation : Oriented x 4         Elopement Risk  Legal Hold: No  Ambulatory or Self Mobile in Wheelchair: No-Not an Elopement Risk    Interdisciplinary Discharge Planning  Does Admitting Nurse Feel This Could be a Complex Discharge?: No  Primary Care Physician: Torres Broyd  Lives with - Patient's Self Care Capacity:  (Grandparents)  Patient or legal guardian wants to designate a caregiver (see row info): No  Support Systems: Family Member(s)  Housing / Facility: [address]  Do You Take your Prescribed Medications Regularly: Yes  Able to Return to Previous ADL's: Yes  Mobility Issues: Yes  Prior Services: None  Patient Expects to be Discharged to:: home  Assistance Needed: Yes  Durable Medical Equipment: Walker, Other - Specify (Shower transfer chair.)                   Vision / Hearing Impairment  Vision Impairment : Yes  Right Eye Vision: Impaired, Wears Glasses  Left Eye Vision: Impaired, Wears Glasses  Hearing Impairment : No    Values / Beliefs / Concerns  Values / Beliefs Concerns : No         Domestic Abuse  Have you ever been the victim of abuse or violence?: No  Physical Abuse or Sexual Abuse: No  Verbal Abuse or Emotional Abuse: No  Possible Abuse Reported to::               Anticipated Discharge Information  Discharge Address: see face sheet  Discharge Contact Phone Number: see facesheet

## 2017-01-22 NOTE — ED NOTES
Pt experiencing facial numbness, RUE numbness, BLE numbness, generalized weakness.  Grandmother states high doses of steroids are all that works.    Pt has appt next week for muscle biopsy for mitochondrial dz.

## 2017-01-23 LAB
BACTERIA BLD CULT: NORMAL
BACTERIA BLD CULT: NORMAL
EKG IMPRESSION: NORMAL
GLUCOSE BLD-MCNC: 155 MG/DL (ref 65–99)
GLUCOSE BLD-MCNC: 199 MG/DL (ref 65–99)
GLUCOSE BLD-MCNC: 219 MG/DL (ref 65–99)
GLUCOSE BLD-MCNC: 239 MG/DL (ref 65–99)
SIGNIFICANT IND 70042: NORMAL
SIGNIFICANT IND 70042: NORMAL
SITE SITE: NORMAL
SITE SITE: NORMAL
SOURCE SOURCE: NORMAL
SOURCE SOURCE: NORMAL

## 2017-01-23 PROCEDURE — 82962 GLUCOSE BLOOD TEST: CPT

## 2017-01-23 PROCEDURE — 700105 HCHG RX REV CODE 258: Performed by: HOSPITALIST

## 2017-01-23 PROCEDURE — 96376 TX/PRO/DX INJ SAME DRUG ADON: CPT

## 2017-01-23 PROCEDURE — 97162 PT EVAL MOD COMPLEX 30 MIN: CPT

## 2017-01-23 PROCEDURE — A9270 NON-COVERED ITEM OR SERVICE: HCPCS | Performed by: HOSPITALIST

## 2017-01-23 PROCEDURE — G8987 SELF CARE CURRENT STATUS: HCPCS | Mod: CL

## 2017-01-23 PROCEDURE — A9270 NON-COVERED ITEM OR SERVICE: HCPCS | Performed by: NURSE PRACTITIONER

## 2017-01-23 PROCEDURE — 99232 SBSQ HOSP IP/OBS MODERATE 35: CPT | Performed by: HOSPITALIST

## 2017-01-23 PROCEDURE — G8978 MOBILITY CURRENT STATUS: HCPCS | Mod: CM

## 2017-01-23 PROCEDURE — G8988 SELF CARE GOAL STATUS: HCPCS | Mod: CK

## 2017-01-23 PROCEDURE — 36415 COLL VENOUS BLD VENIPUNCTURE: CPT

## 2017-01-23 PROCEDURE — 700112 HCHG RX REV CODE 229: Performed by: HOSPITALIST

## 2017-01-23 PROCEDURE — 700102 HCHG RX REV CODE 250 W/ 637 OVERRIDE(OP): Performed by: HOSPITALIST

## 2017-01-23 PROCEDURE — 90732 PPSV23 VACC 2 YRS+ SUBQ/IM: CPT | Performed by: HOSPITALIST

## 2017-01-23 PROCEDURE — 97166 OT EVAL MOD COMPLEX 45 MIN: CPT

## 2017-01-23 PROCEDURE — 700111 HCHG RX REV CODE 636 W/ 250 OVERRIDE (IP): Performed by: HOSPITALIST

## 2017-01-23 PROCEDURE — G8979 MOBILITY GOAL STATUS: HCPCS | Mod: CK

## 2017-01-23 PROCEDURE — 770006 HCHG ROOM/CARE - MED/SURG/GYN SEMI*

## 2017-01-23 PROCEDURE — 96361 HYDRATE IV INFUSION ADD-ON: CPT

## 2017-01-23 PROCEDURE — 96372 THER/PROPH/DIAG INJ SC/IM: CPT

## 2017-01-23 PROCEDURE — 96366 THER/PROPH/DIAG IV INF ADDON: CPT

## 2017-01-23 PROCEDURE — 94660 CPAP INITIATION&MGMT: CPT

## 2017-01-23 PROCEDURE — 700102 HCHG RX REV CODE 250 W/ 637 OVERRIDE(OP): Performed by: NURSE PRACTITIONER

## 2017-01-23 PROCEDURE — 90471 IMMUNIZATION ADMIN: CPT

## 2017-01-23 RX ORDER — FENTANYL 25 UG/1
1 PATCH TRANSDERMAL
Status: DISCONTINUED | OUTPATIENT
Start: 2017-01-23 | End: 2017-02-01 | Stop reason: HOSPADM

## 2017-01-23 RX ADMIN — KETOROLAC TROMETHAMINE 15 MG: 30 INJECTION, SOLUTION INTRAMUSCULAR; INTRAVENOUS at 06:19

## 2017-01-23 RX ADMIN — NAPROXEN 500 MG: 500 TABLET ORAL at 08:23

## 2017-01-23 RX ADMIN — FENTANYL 1 PATCH: 25 PATCH TRANSDERMAL at 19:51

## 2017-01-23 RX ADMIN — INSULIN LISPRO 2 UNITS: 100 INJECTION, SOLUTION INTRAVENOUS; SUBCUTANEOUS at 06:18

## 2017-01-23 RX ADMIN — FLUOXETINE 10 MG: 10 CAPSULE ORAL at 08:25

## 2017-01-23 RX ADMIN — DOCUSATE SODIUM 100 MG: 100 CAPSULE ORAL at 08:22

## 2017-01-23 RX ADMIN — CYCLOBENZAPRINE HYDROCHLORIDE 10 MG: 10 TABLET, FILM COATED ORAL at 21:20

## 2017-01-23 RX ADMIN — FAMOTIDINE 20 MG: 20 TABLET, FILM COATED ORAL at 08:23

## 2017-01-23 RX ADMIN — LEVOTHYROXINE SODIUM 75 MCG: 75 TABLET ORAL at 06:14

## 2017-01-23 RX ADMIN — CYANOCOBALAMIN TAB 500 MCG 1000 MCG: 500 TAB at 08:22

## 2017-01-23 RX ADMIN — Medication 650 MG: at 08:25

## 2017-01-23 RX ADMIN — OXYCODONE HYDROCHLORIDE AND ACETAMINOPHEN 2 TABLET: 10; 325 TABLET ORAL at 16:42

## 2017-01-23 RX ADMIN — Medication 1 TABLET: at 21:20

## 2017-01-23 RX ADMIN — OXYCODONE HYDROCHLORIDE AND ACETAMINOPHEN 2 TABLET: 10; 325 TABLET ORAL at 22:20

## 2017-01-23 RX ADMIN — CYANOCOBALAMIN TAB 500 MCG 1000 MCG: 500 TAB at 21:19

## 2017-01-23 RX ADMIN — CYCLOBENZAPRINE HYDROCHLORIDE 10 MG: 10 TABLET, FILM COATED ORAL at 08:25

## 2017-01-23 RX ADMIN — PNEUMOCOCCAL VACCINE POLYVALENT 25 MCG
25; 25; 25; 25; 25; 25; 25; 25; 25; 25; 25; 25; 25; 25; 25; 25; 25; 25; 25; 25; 25; 25; 25 INJECTION, SOLUTION INTRAMUSCULAR; SUBCUTANEOUS at 17:35

## 2017-01-23 RX ADMIN — GABAPENTIN 600 MG: 400 CAPSULE ORAL at 08:22

## 2017-01-23 RX ADMIN — SODIUM CHLORIDE: 9 INJECTION, SOLUTION INTRAVENOUS at 02:51

## 2017-01-23 RX ADMIN — Medication 650 MG: at 22:15

## 2017-01-23 RX ADMIN — INSULIN LISPRO 2 UNITS: 100 INJECTION, SOLUTION INTRAVENOUS; SUBCUTANEOUS at 11:11

## 2017-01-23 RX ADMIN — OXYBUTYNIN CHLORIDE 10 MG: 10 TABLET, FILM COATED, EXTENDED RELEASE ORAL at 21:00

## 2017-01-23 RX ADMIN — FAMOTIDINE 20 MG: 20 TABLET, FILM COATED ORAL at 21:20

## 2017-01-23 RX ADMIN — OXYCODONE HYDROCHLORIDE AND ACETAMINOPHEN 2 TABLET: 10; 325 TABLET ORAL at 00:52

## 2017-01-23 RX ADMIN — OXYBUTYNIN CHLORIDE 10 MG: 10 TABLET, FILM COATED, EXTENDED RELEASE ORAL at 08:24

## 2017-01-23 RX ADMIN — ZIPRASIDONE HCL 160 MG: 40 CAPSULE ORAL at 22:14

## 2017-01-23 RX ADMIN — Medication 650 MG: at 16:43

## 2017-01-23 RX ADMIN — OMEPRAZOLE 20 MG: 20 CAPSULE, DELAYED RELEASE ORAL at 21:19

## 2017-01-23 RX ADMIN — GABAPENTIN 1200 MG: 400 CAPSULE ORAL at 21:19

## 2017-01-23 RX ADMIN — ENOXAPARIN SODIUM 40 MG: 100 INJECTION SUBCUTANEOUS at 08:25

## 2017-01-23 RX ADMIN — OMEPRAZOLE 20 MG: 20 CAPSULE, DELAYED RELEASE ORAL at 08:23

## 2017-01-23 RX ADMIN — OXYCODONE HYDROCHLORIDE AND ACETAMINOPHEN 2 TABLET: 10; 325 TABLET ORAL at 10:33

## 2017-01-23 RX ADMIN — INSULIN LISPRO 3 UNITS: 100 INJECTION, SOLUTION INTRAVENOUS; SUBCUTANEOUS at 21:32

## 2017-01-23 RX ADMIN — NAPROXEN 500 MG: 500 TABLET ORAL at 22:15

## 2017-01-23 RX ADMIN — SODIUM CHLORIDE 1000 MG: 9 INJECTION, SOLUTION INTRAVENOUS at 08:54

## 2017-01-23 ASSESSMENT — PAIN SCALES - GENERAL
PAINLEVEL_OUTOF10: 10
PAINLEVEL_OUTOF10: 9
PAINLEVEL_OUTOF10: 8
PAINLEVEL_OUTOF10: 8
PAINLEVEL_OUTOF10: 10
PAINLEVEL_OUTOF10: 9

## 2017-01-23 ASSESSMENT — ENCOUNTER SYMPTOMS
SPEECH CHANGE: 1
CHILLS: 0
DOUBLE VISION: 0
FOCAL WEAKNESS: 1
VOMITING: 0
NERVOUS/ANXIOUS: 1
COUGH: 0
BLURRED VISION: 1
BRUISES/BLEEDS EASILY: 0
CONSTITUTIONAL NEGATIVE: 1
DIARRHEA: 0
MYALGIAS: 1
CARDIOVASCULAR NEGATIVE: 1
BACK PAIN: 1
SENSORY CHANGE: 1
DEPRESSION: 1
HEARTBURN: 1
PALPITATIONS: 0
TINGLING: 0
DIZZINESS: 0
NAUSEA: 0
HEADACHES: 1
WEAKNESS: 0
CONSTIPATION: 0
SHORTNESS OF BREATH: 0
MYALGIAS: 0
FEVER: 0
RESPIRATORY NEGATIVE: 1

## 2017-01-23 ASSESSMENT — GAIT ASSESSMENTS: GAIT LEVEL OF ASSIST: UNABLE TO PARTICIPATE

## 2017-01-23 ASSESSMENT — ACTIVITIES OF DAILY LIVING (ADL): TOILETING: REQUIRES ASSIST

## 2017-01-23 NOTE — PROGRESS NOTES
[facility] HOSPITALIST SERVICE PROGRESS NOTE    CC: Numbness and Headache      Consults:  Dr Fox    OVERVIEW  Hospital Course:  Krisitn Balderrama is a 26 y.o. female w/ extensive medical history and is currently undergoing continued eval for mitochondrial disorder that presents again for paralysis of bilat lower ext and right arm. Dr Fox knows this patient well and is following. During her last admission she responded to IV steroids and he has put her on IV solumedrol for 5 days then switch to oral taper.    1/23- no change to paralysis BLE/RUE, c/o severe pain and wants IV medications, says steroids/anti-inflamm/percocet are not enough. Had long discussion about her chronic pain, appropriate medications, and need to avoid IV narcotic if able. Nurses are turning her every few hours    SUBJECTIVE    Review of Systems   Constitutional: Negative.  Negative for fever, chills and malaise/fatigue.   HENT: Negative for congestion.         Dysphagia   Eyes: Positive for blurred vision. Negative for double vision.   Respiratory: Negative.  Negative for cough and shortness of breath.    Cardiovascular: Negative.  Negative for chest pain, palpitations and leg swelling.   Gastrointestinal: Positive for heartburn. Negative for nausea, vomiting, diarrhea and constipation.   Genitourinary: Negative.         Suprapubic catheter- for urinary retention/neurogenic bladder and get chronic UTIs   Musculoskeletal: Positive for back pain and joint pain (Lhip and Lknee). Negative for myalgias.   Skin: Negative.  Negative for itching and rash.   Neurological: Positive for sensory change (numbness to RUE, BLE), focal weakness (RUE, BLE) and headaches. Negative for dizziness, tingling and weakness.   Endo/Heme/Allergies: Negative.  Does not bruise/bleed easily.   Psychiatric/Behavioral: Positive for depression. Negative for suicidal ideas. The patient is nervous/anxious (about pain not being controlled).      All other systems were  reviewed and otherwise negative aside from mentioned above    OBJECTIVE  Vital Signs: Last Filed Vitals Signs: 24 Hour Range  Blood pressure 118/58, pulse 94, temperature 36 °C (96.8 °F), resp. rate 17, weight 116.574 kg (257 lb), last menstrual period 06/22/2016, SpO2 95 %.      Physical Exam   Constitutional: She is oriented to person, place, and time.   Morbidly obese   HENT:   Head: Normocephalic and atraumatic.   Mouth/Throat: Oropharynx is clear and moist.   Eyes: Conjunctivae and EOM are normal. No scleral icterus.   Neck: Normal range of motion. Neck supple. No JVD present. No tracheal deviation present.   Cardiovascular: Normal rate, regular rhythm, normal heart sounds and intact distal pulses.    Pulmonary/Chest: Effort normal and breath sounds normal. No respiratory distress. She has no wheezes. She has no rales.   Abdominal: Soft. Bowel sounds are normal. She exhibits no distension. There is no tenderness.   rounded   Genitourinary:   Suprapubic catheter in place   Musculoskeletal: She exhibits no edema or tenderness.   Able to lift right arm a few inches  No movement to BLE- question true effort   Neurological: She is alert and oriented to person, place, and time. She displays normal reflexes. No cranial nerve deficit.   Skin: Skin is warm and dry. No rash noted. She is not diaphoretic. No erythema.   Psychiatric: Memory normal.   Poor mood, agitated   Nursing note and vitals reviewed.        Laboratory Studies:  Recent Labs      01/22/17   0735  01/22/17   0820   WBC  6.6  7.0   RBC  4.72  4.75   HEMOGLOBIN  13.8  14.0   HEMATOCRIT  41.9  42.4   MCV  88.8  89.3   MCH  29.2  29.5   RDW  40.6  41.3   PLATELETCT  198  206   MPV  11.6  11.8   NEUTSPOLYS  52.20  50.50   LYMPHOCYTES  36.60  38.20   MONOCYTES  7.20  7.40   EOSINOPHILS  2.70  2.80   BASOPHILS  0.80  0.70     Recent Labs      01/22/17   0735  01/22/17   0820   SODIUM  136  139   POTASSIUM  4.2  4.3   CHLORIDE  105  105   CO2  22  24   GLUCOSE   108*  85   BUN  12  12   CREATININE  0.61  0.69       Medications:  Medications were reviewed and updated     Assessment and Plan:      Active Hospital Problems    Diagnosis   • Paralysis (CMS-HCC) [G83.9]     Continued, unchanged  Appreciate Dr Fox following and recs, seeing if able to get muscle biopsy done inpt through [facility]- was scheduled as outpt for Tuesday- has been getting evals for mitochondrial disorder  Cont IV Solumedrol 1g x 5 days     • H/O Sinus tachycardia [R00.0]     Normal SR 80-90s     H/o ablation procedure  Follows with cardiology  Cont ivabradine     • H/o HTN (hypertension) [I10]     Not on meds now  Has been stable, cont to monitor   • Chronic pain syndrome [G89.4]     Cont naproxen, percocet, flexiril  Add fentanyl 50mcg patch   • Dysphagia [R13.10]     SLP eval ordered  Cont dysphagia 1 diet   • Suprapubic catheter (CMS-HCC) [Z93.59]     Cont cath care  Cont oxybutynin   • Psychosis, schizophrenia, simple (HCC) [F20.89]     Cont geodon/prozac     Class: Chronic   • Hypothyroidism [E03.9]     Cont synthroid   • Chronic UTI [N39.0]          Had just completed abx   • Weakness [R53.1]  PT/OT evals  May need rehab again at dc   • Chronic inflammatory arthritis [M19.90]   cont naproxen   • 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]  Likely cause of paralysis at this point  Follows with psych outpt   • GERD (gastroesophageal reflux disease) [K21.9]  Cont pepcid/prilosec             Patient was discussed with bedside RN/SW    High acuity, moderate risk, complex medical decision making    Core Measures  Abx:n/a   PPx: Lovenox  Bowel: Regimen  PTOT: evals ordered  Lines/Andrade: suprapubic cath    Code: FULL    Dispo: transfer inpt, cont extensive home regimens and IV solumedrol 1g x 5 days, add fentanyl patch for pain, appreciate Dr Fox recs and to see if able to get muscle biopsy with [facility] while inpt, cont PT/OT daily    Last admission for the same in October pt was sent to rehab- will possibly need same but  will see how she responds to steroids and therapy.       Ashleigh PUGA

## 2017-01-23 NOTE — CONSULTS
DATE OF SERVICE:  01/22/2017    CHIEF COMPLAINT:  Neurologic consultation requested on this lady by Dr. Lima   in the emergency room, brought in with acute onset of right facial and upper   extremity numbness and tingling, bilateral lower extremity weakness with   paresthesias and word finding difficulties.    HISTORY OF PRESENT ILLNESS:  Patient is well known to me, a 26-year-old   right-handed  female who has a history of fluctuating paraparesis,   suspected mitochondrial disorder, and who was pending muscle biopsy.    Diagnostics in the past for this condition has been thorough including imaging   of the entire brain and spinal axis, repeat EMG/NCV studies, CSF studies,   etc.  Everything so far has proved unremarkable/nondiagnostic.  Finally,   muscle biopsy has been scheduled in 2 days.  Though genetics and serology for   mitochondrial disorders were recommended, the price proved prohibitive.    Patient was in her usual state of health when earlier this morning, she awoke   with noticeable language difficulties.  She cannot be sure of the exact time   of symptom onset.  She had difficulty understanding and finding words, there   were paresthesias involving the entire right side of the body including the   face, arm, and leg, and she also noted increased weakness in her legs.  There   was no pain, there was no incontinence of bowel, and she has an indwelling   Andrade catheter.  Already on treatment for a urinary tract infection, she has   been continued on antibiotics.  There were no other medication changes.  She   had not suffered from any trauma.  Upon arrival here via EMS, she states there   is no movement with the legs, limited ability to use the right arm, her   speech remained slurred.  She denies any visual changes, chest pain or   palpitations, nausea, vomiting, fevers, night sweats, chills, or any left   facial or upper extremity motor or sensory distortions.    In history, she has had  episodes of weakness involving both legs that were   more subacute in onset, admitted for IV steroids, she always had slow and   steady improvement.  Her last office visit with me was late last year.  At   that time, she had been stable.    REVIEW OF SYSTEMS:  Other than the above, as per AMA and CMS guidelines,   systems review was negative.    PAST MEDICAL HISTORY:  1.  Progressive muscle weakness and paresthesias, possible mitochondrial   disorder.  2.  Obesity.  3.  Recent UTI.  4.  Hypothyroidism.  5.  Gastroparesis.  6.  Depression/anxiety.    SURGICAL HISTORY:  Negative from my standpoint.    MEDICATIONS:  Patient is on lactulose, vitamin D and calcium, Vantin and   Cleocin.  She takes Flexeril, Prozac, Synthroid, Naprosyn, Prilosec, Ditropan   XL, Pepcid, Geodon, vitamin C, and calcium supplement and Colace.    ALLERGIES:  There is a list of medication allergies and reactions the length   of my arm in the electronic health record.    FAMILY HISTORY:  Noncontributory.    SOCIAL HISTORY:  She does not smoke or drink.    PHYSICAL EXAMINATION:  CONSTITUTIONAL:  The patient is lying in bed in the emergency room and is in   some mild distress related to the language difficulties and new symptoms.  She   is well developed, but severely overweight.  She is cooperative.  VITAL SIGNS:  Stable, blood pressure 143/72, she is afebrile, pulse is 84 and   regular, respiratory rate 16.  She is 97% saturated on a 2 L nasal cannula   supplement.  HEENT:  Negative for malar rash or temporal tenderness.  NECK:  Supple, without meningismus.  Carotid pulses are present without   asymmetry.  COR:  Remarkable for a regular rhythm.  VASCULAR:  Negative for edema.  NEUROLOGIC EXAMINATION:  She is fully oriented, there is some word finding   hesitancy, but no paraphasic errors are used.  There are no comprehension   issues.  There is no apraxia or inattention.    Cranial nerve exam reveals PERRLA/EOMI, there is some inconsistency with  "  effort.  When she does move the eyes to follow the examiner's finger, then   when observed passively, she is seen to move her eyes completely and without   difficulty or nystagmus.  Visual fields are grossly full.  There is some   flattening of the right nasolabial fold, but her smile is still symmetric, she   purses her lips symmetrically.  There is slight dysarthria at times, though   speech then spontaneously clears in general conversation.  She splits the   midline, feeling vibration less on the right side of the face on the right   side of the jaw, when compared to the left, pinprick is lost to the midline on   the right when compared to the left.  She also feels \"negative\" stimulus,   every time she is touched she simply states \"I can't feel that.\"    Motor exam reveals a right hemiparesis as well as weakness of the left leg.    There are no movements with the legs, though it is difficult to know, she is   actually attempting any movement.  In the arm, there is some movement though   the weakness seems to be quite profound.  There are no obvious reflex   asymmetries, except the ankle jerks are absent bilaterally.  The toes remain   downgoing.    Coordination cannot be assessed in the legs because of the weakness.  This is   intact with the left upper extremity, movements are slowed with the right arm   and hand.    Sensory exam reveals the right-sided sensory deficit to vibration,   temperature, and pinprick when compared to the left.  She splits the midline   to pinprick, when tract across the chest from right to left, she also feels   vibration less on the right side of the sternum when compared to the left.    DIAGNOSTICS:  CT of the brain is normal, as is CTA of the brain and neck.    Blood work revealed an unremarkable hemogram and CMP, the latter remarkable   only for an elevated glucose of 108 and GPT of 61.  Coagulation profile and   troponin are all unremarkable/normal.    IMPRESSION:  I suspect " this young lady's symptoms are more of a conversion   process rather than an actual manifestation of disease.  I believe she   probably has a very mild and slowly progressive mitochondrial disorder, we   will try to make arrangements for muscle biopsy to be done while she is   hospitalized.  The condition we are looking for is not an inflammatory one,  thus using IV steroids will in no way affect the biopsy results.  IV steroids   obviously are going to be the treatment of choice and she responds well to   these, though I cannot understand why this might be the case other than a   placebo effect and a mood elevation effect.  A mitochondrial disorder that may   be the cause of all of her problems, has no treatments available for it,   these disorders are simply slowly and steadily progressive.    Face to face time was spent reviewing all of the above with the patient and   her grandmother with whom she lives.  The rationale for placing her on   steroids and the need to get this muscle biopsy done sooner rather than later   is quite clear and well.  We will attempt to make appropriate arrangements.    Physical and occupational therapies obviously are going to be necessary.  All   other home medications can be she continued.    PLAN:  1.  Admit for IV methylprednisolone 1 g daily for 5 days followed by oral   titration.  2.  Contact GI specialists to make arrangements for muscle biopsy while the   patient is hospitalized.  3.  I will follow the patient with you.    Thank you very much for this consultation.    TIME:  Evaluation of 70 minutes for exam, review, discussion, and education,   mild complexity.    DISCUSSION:  As mentioned in the assessment, over 50% of the time was spent   face to face with the patient, but also with healthcare staff counseling and   coordinating care.       ____________________________________     MD CASSANDRA MARTINEZ / CHRISTELLE    DD:  01/22/2017 16:14:07  DT:  01/22/2017  16:49:47    D#:  [id number]  Job#:  [id number]

## 2017-01-23 NOTE — PROGRESS NOTES
Neurology Progress Note               Author: Sandoval Fox Date & Time created: 1/23/2017  2:48 PM     Interval History:  Patient states that she is doing well, there has been little improvement in the weakness that she presented with. She did say at the end of the bed requiring minimal assistance. Getting her there was another story altogether. She related a syncopal event prior to her admission, something she omitted yesterday. Standing at the time, she simply collapsed to the ground. She is on steroids, receiving her second dose today, of a total of 5 doses. Neurodiagnostics are not being pursued at this time since there is no need. I did contact Dr. Vigil, who is scheduled to see the patient on the outside in one day to schedule muscle biopsy. He is very willing to do so as an inpatient.    Review of Systems:  Review of Systems   Musculoskeletal: Positive for myalgias and joint pain.   Neurological: Positive for sensory change, speech change, focal weakness and headaches.   All other systems reviewed and are negative.      Physical Exam:  Physical Exam   Neurological:   The exam was deferred for today.       Labs:        Invalid input(s): [id number]  Recent Labs      01/22/17 0735   TROPONINI  <0.01     Recent Labs      01/22/17   0735  01/22/17   0820   SODIUM  136  139   POTASSIUM  4.2  4.3   CHLORIDE  105  105   CO2  22  24   BUN  12  12   CREATININE  0.61  0.69   CALCIUM  9.8  10.1     Recent Labs      01/22/17   0735  01/22/17   0820   ALTSGPT  61*  64*   ASTSGOT  38  39   ALKPHOSPHAT  81  75   TBILIRUBIN  0.3  0.4   GLUCOSE  108*  85     Recent Labs      01/22/17 0735  01/22/17   0820   RBC  4.72  4.75   HEMOGLOBIN  13.8  14.0   HEMATOCRIT  41.9  42.4   PLATELETCT  198  206   PROTHROMBTM  12.7   --    INR  0.93   --      Recent Labs      01/22/17 0735  01/22/17   0820   WBC  6.6  7.0   NEUTSPOLYS  52.20  50.50   LYMPHOCYTES  36.60  38.20   MONOCYTES  7.20  7.40   EOSINOPHILS  2.70  2.80    BASOPHILS  0.80  0.70   ASTSGOT  38  39   ALTSGPT  61*  64*   ALKPHOSPHAT  81  75   TBILIRUBIN  0.3  0.4     Recent Labs      17   0735  17   0820   SODIUM  136  139   POTASSIUM  4.2  4.3   CHLORIDE  105  105   CO2  22  24   GLUCOSE  108*  85   BUN  12  12   CREATININE  0.61  0.69   CALCIUM  9.8  10.1     Hemodynamics:  Temp (24hrs), Av.2 °C (97.2 °F), Min:36 °C (96.8 °F), Max:36.6 °C (97.9 °F)  Temperature: 36 °C (96.8 °F)  Pulse  Av.5  Min: 82  Max: 108Heart Rate (Monitored): 100  Blood Pressure: 118/58 mmHg     Respiratory:    Respiration: 17, Pulse Oximetry: 95 %     Work Of Breathing / Effort: Mild     Fluids:    Intake/Output Summary (Last 24 hours) at 17 1448  Last data filed at 17 1900   Gross per 24 hour   Intake      0 ml   Output    820 ml   Net   -820 ml        GI/Nutrition:  Orders Placed This Encounter   Procedures   • Diet Order     Standing Status: Standing      Number of Occurrences: 1      Standing Expiration Date:      Order Specific Question:  Diet:     Answer:  Consistent Carbohydrate [4]     Order Specific Question:  Texture/Fiber modifications:     Answer:  Dysphagia 1(Pureed)specify fluid consistency(question 6) [1]     Medical Decision Making, by Problem:  Active Hospital Problems    Diagnosis   • *Paralysis (CMS-HCC) [G83.9]   • Sinus tachycardia [R00.0]   • HTN (hypertension) [I10]   • Chronic pain syndrome [G89.4]   • Dysphagia [R13.10]   • Suprapubic catheter (CMS-[facility]) [Z93.59]   • Psychosis, schizophrenia, simple ([facility]) [F20.89]   • Hypothyroidism [E03.9]   • Chronic UTI [N39.0]   • Weakness [R53.1]   • Chronic inflammatory arthritis [M19.90]   • 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 I'm not sure of this syncopal event and its significance. Additional diagnostics are not indicated.  She is complaining of an immense amount of pain, she's been given only Percocet, she is asking for additional medication. She was told that her pain will improve if she can simply get up walk and assist with physical therapy.    Face-to-face time was spent reviewing all the above, with both the patient's mother, grandmother, sister and niece present. She is eating well, always a good thing. She was encouraged to be active, etc.    Core Measures

## 2017-01-23 NOTE — PROGRESS NOTES
A/o,assessment completed,poc discussed,verbalized understanding,tolerating pills floated with apple sauce,thickened  Liqiuds,aspiration precaution maintained, blurry vision, min right facial droop, numbness right side of face,tingling right arm,weak , 2/5,joshua legs weak and cannot move,numb from knees down, and above,some sensation on knees,pain 10/10 on hips,knees,back and head, supra pubic catheter in place with clear urine output,turned q 2 hrs,right leg elevated on pillows ,call button within reach,will continue to monitor.

## 2017-01-23 NOTE — PROGRESS NOTES
Report received, pt care assumed. Pt assessment complete. Pt aaox4, no signs of distress noted at this time. POC discussed with pt and verbalizes no questions. Pt c/o of 10/10 pain in hips, back and head, scheduled Naproxen given (see MAR). Pt denies any additional needs at this time. Bed in lowest position and locked. Pt educated on fall risk and verbalized understanding, call light and personal belongings within reach, will continue to monitor.

## 2017-01-24 PROBLEM — G82.20 PARAPARESIS OF BOTH LOWER LIMBS (HCC): Status: ACTIVE | Noted: 2017-01-24

## 2017-01-24 LAB
GLUCOSE BLD-MCNC: 138 MG/DL (ref 65–99)
GLUCOSE BLD-MCNC: 146 MG/DL (ref 65–99)
GLUCOSE BLD-MCNC: 169 MG/DL (ref 65–99)
GLUCOSE BLD-MCNC: 177 MG/DL (ref 65–99)

## 2017-01-24 PROCEDURE — 700112 HCHG RX REV CODE 229: Performed by: HOSPITALIST

## 2017-01-24 PROCEDURE — 700102 HCHG RX REV CODE 250 W/ 637 OVERRIDE(OP): Performed by: FAMILY MEDICINE

## 2017-01-24 PROCEDURE — A9270 NON-COVERED ITEM OR SERVICE: HCPCS | Performed by: HOSPITALIST

## 2017-01-24 PROCEDURE — 700111 HCHG RX REV CODE 636 W/ 250 OVERRIDE (IP): Performed by: HOSPITALIST

## 2017-01-24 PROCEDURE — 94660 CPAP INITIATION&MGMT: CPT

## 2017-01-24 PROCEDURE — 302146: Performed by: FAMILY MEDICINE

## 2017-01-24 PROCEDURE — A9270 NON-COVERED ITEM OR SERVICE: HCPCS | Performed by: FAMILY MEDICINE

## 2017-01-24 PROCEDURE — 770006 HCHG ROOM/CARE - MED/SURG/GYN SEMI*

## 2017-01-24 PROCEDURE — 82962 GLUCOSE BLOOD TEST: CPT

## 2017-01-24 PROCEDURE — 99232 SBSQ HOSP IP/OBS MODERATE 35: CPT | Performed by: FAMILY MEDICINE

## 2017-01-24 PROCEDURE — 700102 HCHG RX REV CODE 250 W/ 637 OVERRIDE(OP): Performed by: HOSPITALIST

## 2017-01-24 PROCEDURE — 700105 HCHG RX REV CODE 258: Performed by: HOSPITALIST

## 2017-01-24 RX ORDER — OXYCODONE HYDROCHLORIDE 10 MG/1
10 TABLET ORAL EVERY 4 HOURS PRN
Status: DISCONTINUED | OUTPATIENT
Start: 2017-01-24 | End: 2017-02-01 | Stop reason: HOSPADM

## 2017-01-24 RX ORDER — OXYCODONE HYDROCHLORIDE 10 MG/1
20 TABLET ORAL EVERY 4 HOURS PRN
Status: DISCONTINUED | OUTPATIENT
Start: 2017-01-24 | End: 2017-02-01 | Stop reason: HOSPADM

## 2017-01-24 RX ORDER — OXYCODONE HYDROCHLORIDE 10 MG/1
10-20 TABLET ORAL EVERY 4 HOURS PRN
Status: DISCONTINUED | OUTPATIENT
Start: 2017-01-24 | End: 2017-01-24

## 2017-01-24 RX ADMIN — INSULIN LISPRO 2 UNITS: 100 INJECTION, SOLUTION INTRAVENOUS; SUBCUTANEOUS at 16:51

## 2017-01-24 RX ADMIN — CYCLOBENZAPRINE HYDROCHLORIDE 10 MG: 10 TABLET, FILM COATED ORAL at 19:50

## 2017-01-24 RX ADMIN — Medication 1 TABLET: at 19:49

## 2017-01-24 RX ADMIN — Medication 650 MG: at 08:57

## 2017-01-24 RX ADMIN — LEVOTHYROXINE SODIUM 75 MCG: 75 TABLET ORAL at 06:01

## 2017-01-24 RX ADMIN — CYANOCOBALAMIN TAB 500 MCG 1000 MCG: 500 TAB at 19:48

## 2017-01-24 RX ADMIN — INSULIN LISPRO 2 UNITS: 100 INJECTION, SOLUTION INTRAVENOUS; SUBCUTANEOUS at 19:54

## 2017-01-24 RX ADMIN — DOCUSATE SODIUM 100 MG: 100 CAPSULE ORAL at 08:57

## 2017-01-24 RX ADMIN — OMEPRAZOLE 20 MG: 20 CAPSULE, DELAYED RELEASE ORAL at 08:57

## 2017-01-24 RX ADMIN — NAPROXEN 500 MG: 500 TABLET ORAL at 08:57

## 2017-01-24 RX ADMIN — OXYCODONE HYDROCHLORIDE 20 MG: 10 TABLET ORAL at 16:03

## 2017-01-24 RX ADMIN — OXYCODONE HYDROCHLORIDE AND ACETAMINOPHEN 2 TABLET: 10; 325 TABLET ORAL at 06:00

## 2017-01-24 RX ADMIN — SODIUM CHLORIDE 1000 MG: 9 INJECTION, SOLUTION INTRAVENOUS at 09:50

## 2017-01-24 RX ADMIN — OXYBUTYNIN CHLORIDE 10 MG: 10 TABLET, FILM COATED, EXTENDED RELEASE ORAL at 08:57

## 2017-01-24 RX ADMIN — SODIUM CHLORIDE: 9 INJECTION, SOLUTION INTRAVENOUS at 14:20

## 2017-01-24 RX ADMIN — OMEPRAZOLE 20 MG: 20 CAPSULE, DELAYED RELEASE ORAL at 19:50

## 2017-01-24 RX ADMIN — ENOXAPARIN SODIUM 40 MG: 100 INJECTION SUBCUTANEOUS at 08:57

## 2017-01-24 RX ADMIN — FLUOXETINE 10 MG: 10 CAPSULE ORAL at 08:57

## 2017-01-24 RX ADMIN — OXYCODONE HYDROCHLORIDE 20 MG: 10 TABLET ORAL at 20:01

## 2017-01-24 RX ADMIN — OXYCODONE HYDROCHLORIDE 20 MG: 10 TABLET ORAL at 23:59

## 2017-01-24 RX ADMIN — OXYCODONE HYDROCHLORIDE 20 MG: 10 TABLET ORAL at 11:48

## 2017-01-24 RX ADMIN — CYCLOBENZAPRINE HYDROCHLORIDE 10 MG: 10 TABLET, FILM COATED ORAL at 08:57

## 2017-01-24 RX ADMIN — NAPROXEN 500 MG: 500 TABLET ORAL at 19:50

## 2017-01-24 RX ADMIN — GABAPENTIN 600 MG: 400 CAPSULE ORAL at 08:57

## 2017-01-24 RX ADMIN — CYANOCOBALAMIN TAB 500 MCG 1000 MCG: 500 TAB at 08:57

## 2017-01-24 RX ADMIN — OXYBUTYNIN CHLORIDE 10 MG: 10 TABLET, FILM COATED, EXTENDED RELEASE ORAL at 19:49

## 2017-01-24 RX ADMIN — FAMOTIDINE 20 MG: 20 TABLET, FILM COATED ORAL at 08:57

## 2017-01-24 RX ADMIN — Medication 650 MG: at 19:49

## 2017-01-24 RX ADMIN — LACTULOSE 30 ML: 10 SOLUTION ORAL at 14:16

## 2017-01-24 RX ADMIN — ZIPRASIDONE HCL 160 MG: 40 CAPSULE ORAL at 19:49

## 2017-01-24 RX ADMIN — Medication 650 MG: at 14:20

## 2017-01-24 RX ADMIN — GABAPENTIN 1200 MG: 400 CAPSULE ORAL at 19:49

## 2017-01-24 ASSESSMENT — ENCOUNTER SYMPTOMS
CHILLS: 0
FOCAL WEAKNESS: 1
VOMITING: 0
SENSORY CHANGE: 1
HEADACHES: 0
BLURRED VISION: 0
SHORTNESS OF BREATH: 0
FEVER: 0
DIARRHEA: 0
COUGH: 0
SEIZURES: 0
WEAKNESS: 1
NAUSEA: 0
DIZZINESS: 0
WHEEZING: 0
ABDOMINAL PAIN: 0
HEARTBURN: 0
SPEECH CHANGE: 0
SORE THROAT: 0

## 2017-01-24 ASSESSMENT — PAIN SCALES - GENERAL
PAINLEVEL_OUTOF10: 9
PAINLEVEL_OUTOF10: 8
PAINLEVEL_OUTOF10: 10
PAINLEVEL_OUTOF10: 9
PAINLEVEL_OUTOF10: 9

## 2017-01-24 NOTE — CARE PLAN
Problem: Nutritional:  Goal: Achieve adequate nutritional intake  Patient will consume 50% of meals  Outcome: MET Date Met:  01/24/17  Per Pt, and per ADL's, pt is eating at least 50% of her meals, and states her appetite is better.

## 2017-01-24 NOTE — CARE PLAN
Problem: Skin Integrity  Goal: Risk for impaired skin integrity will decrease  Patient is turned Q2H by RN/CNA to reduce risk of skin breakdown and pressure ulcers. Pillows used to reposition and for comfort.

## 2017-01-24 NOTE — PROGRESS NOTES
Pt re-educated on benefits of and safety risks of not having a bed alarm.  Pt continues to refuse to have bed alarm on.  Pt calls appropriately with any needs and to get OOB.  Bed alarm off at this time.

## 2017-01-24 NOTE — CARE PLAN
Problem: Safety  Goal: Will remain free from falls  Outcome: PROGRESSING AS EXPECTED  Fall precautions remain in place: treaded socks on pt, appropriate signs on doorway, armbands on pt, IV pole on side of bathroom, lowest bed rails down, bed in lowest position and locked, call light and phone within reach, bed alarm off.    Problem: Skin Integrity  Goal: Risk for impaired skin integrity will decrease  Outcome: PROGRESSING AS EXPECTED  Q2hour turning in place.  No observation of skin breakdown at this time.

## 2017-01-24 NOTE — PROGRESS NOTES
"Pt arrived to floor complaining of 10/10 pain and headache stating \"oxycodone does nothing for me\" and \"last time I was here they gave me fentanyl and morphine\". RN spoke with Nurse Practitioner who ordered fentanyl patch but stated to remind pt she would need to speak with her pain management MD before IV morphine could be administered. She was also notified pt's urine is orange. Pt has history of UTI but has no other symptoms at this time.     Pt also arrived with late medications. Previous RN stated she tubed medications to floor but were unable to be found. RN ordered new medications from pharmacy. Pt updated that medications would be late. Pt verbalized understanding.   "

## 2017-01-24 NOTE — PROGRESS NOTES
Spoke with Catalina (infection control) re: suprapubic catheter and necessity to change it.  Per Catalina, okay to keep in and not change at this time.

## 2017-01-24 NOTE — CARE PLAN
Problem: Pain Management  Goal: Pain level will decrease to patient’s comfort goal  Intervention: Educate and implement non-pharmacologic comfort measures. Examples: relaxation, distration, play therapy, activity therapy, massage, etc.  Scheduled and PRN medications given as ordered; RN also educated and performs non-pharmacological methods to help reduce pain.

## 2017-01-24 NOTE — DIETARY
"Nutrition Services- Poor PO Consult    Current Ht: 157.5 cm  Current Wt: 116.574 kg  BMI: 46.99    Pertinent labs: POC glucose/24 hours 146-239  Pertinent Medications: Vitamin B12, colace, senokot, pepcid, SSI, solumedrol, sodium bicarbonate, Vitamin D  Pertinent Fluids: NS IVF @ 50 ml/hr    Pt seen today for difficulty chewing and poor PO pta. Pt reports pre existing dysphagia, with food feeling \"stuck in her throat\". While admitted, pt reports a good appetite and is eating well on dysphagia 1 diet. Per ADL's, pt ate % of her breakfast. No other ADL's recorded. In terms of poor PO pta, pt reports pain and weakness that impacted her appetite. Pt reports trying to eat 6 small meals per day to maintain her nutrition status. Pt reports her usual body weight 260 lbs, but reports losing 5 lbs in 3 days due to loss of appetite. However, pt admits her weight routinely fluctuates with fluid balance and that her appetite has improved. Her current weight is 257 lbs, which would result in a 3 lb wt loss from stated usual body wt.  Pt with BMI >40 (=46.99). Weight loss counseling not appropriate in acute care setting.     RECOMMEND -   Encourage PO intake  Nutrition Rep to see pt for snacks/supplements  Referral to outpatient nutrition services for weight management after D/C.   Record meals in ADL as percentage of meal consumed    RD available PRN    "

## 2017-01-24 NOTE — PROGRESS NOTES
Assumed pt care at 0700.  Received report from NEEL Garcia RN.  AM assessment completed.  AAOx4. Pt denies SOB; c/o pain of 9 on 0 to 10 pain scale (unable to administer rx at this time, pt aware; repositioned for comfort and emotional support provided).  Pt educated on use of call light and bed alarm, provided pt with RN and CNA extension numbers on white board and encouraged pt to call when needed, and discussed plan of care for the day (safety, pain control); pt verbalizes understanding.  Denies any additional needs at this time.  Call light and phone within reach; treaded socks in place and bed alarm off at this time.  Hourly rounding in place.  Will continue to monitor.

## 2017-01-25 LAB
ANION GAP SERPL CALC-SCNC: 8 MMOL/L (ref 0–11.9)
BUN SERPL-MCNC: 24 MG/DL (ref 8–22)
CALCIUM SERPL-MCNC: 9 MG/DL (ref 8.5–10.5)
CHLORIDE SERPL-SCNC: 105 MMOL/L (ref 96–112)
CO2 SERPL-SCNC: 26 MMOL/L (ref 20–33)
CREAT SERPL-MCNC: 0.65 MG/DL (ref 0.5–1.4)
GFR SERPL CREATININE-BSD FRML MDRD: >60 ML/MIN/1.73 M 2
GLUCOSE BLD-MCNC: 127 MG/DL (ref 65–99)
GLUCOSE BLD-MCNC: 132 MG/DL (ref 65–99)
GLUCOSE BLD-MCNC: 158 MG/DL (ref 65–99)
GLUCOSE BLD-MCNC: 202 MG/DL (ref 65–99)
GLUCOSE SERPL-MCNC: 137 MG/DL (ref 65–99)
POTASSIUM SERPL-SCNC: 4 MMOL/L (ref 3.6–5.5)
SODIUM SERPL-SCNC: 139 MMOL/L (ref 135–145)

## 2017-01-25 PROCEDURE — 80048 BASIC METABOLIC PNL TOTAL CA: CPT

## 2017-01-25 PROCEDURE — A9270 NON-COVERED ITEM OR SERVICE: HCPCS | Performed by: HOSPITALIST

## 2017-01-25 PROCEDURE — 700102 HCHG RX REV CODE 250 W/ 637 OVERRIDE(OP): Performed by: FAMILY MEDICINE

## 2017-01-25 PROCEDURE — 99232 SBSQ HOSP IP/OBS MODERATE 35: CPT | Performed by: FAMILY MEDICINE

## 2017-01-25 PROCEDURE — 770006 HCHG ROOM/CARE - MED/SURG/GYN SEMI*

## 2017-01-25 PROCEDURE — 700102 HCHG RX REV CODE 250 W/ 637 OVERRIDE(OP): Performed by: HOSPITALIST

## 2017-01-25 PROCEDURE — 700105 HCHG RX REV CODE 258: Performed by: HOSPITALIST

## 2017-01-25 PROCEDURE — 700111 HCHG RX REV CODE 636 W/ 250 OVERRIDE (IP): Performed by: HOSPITALIST

## 2017-01-25 PROCEDURE — 94660 CPAP INITIATION&MGMT: CPT

## 2017-01-25 PROCEDURE — 700112 HCHG RX REV CODE 229: Performed by: HOSPITALIST

## 2017-01-25 PROCEDURE — 82962 GLUCOSE BLOOD TEST: CPT

## 2017-01-25 PROCEDURE — A9270 NON-COVERED ITEM OR SERVICE: HCPCS | Performed by: FAMILY MEDICINE

## 2017-01-25 PROCEDURE — 36415 COLL VENOUS BLD VENIPUNCTURE: CPT

## 2017-01-25 RX ORDER — LACTULOSE 20 G/30ML
15 SOLUTION ORAL 2 TIMES DAILY
Status: DISCONTINUED | OUTPATIENT
Start: 2017-01-25 | End: 2017-01-30

## 2017-01-25 RX ADMIN — Medication 650 MG: at 17:08

## 2017-01-25 RX ADMIN — PROMETHAZINE HYDROCHLORIDE 25 MG: 25 TABLET ORAL at 17:08

## 2017-01-25 RX ADMIN — GABAPENTIN 600 MG: 400 CAPSULE ORAL at 09:02

## 2017-01-25 RX ADMIN — OXYCODONE HYDROCHLORIDE 20 MG: 10 TABLET ORAL at 17:08

## 2017-01-25 RX ADMIN — CYCLOBENZAPRINE HYDROCHLORIDE 10 MG: 10 TABLET, FILM COATED ORAL at 09:02

## 2017-01-25 RX ADMIN — OXYCODONE HYDROCHLORIDE 20 MG: 10 TABLET ORAL at 21:16

## 2017-01-25 RX ADMIN — Medication 650 MG: at 21:19

## 2017-01-25 RX ADMIN — Medication 1 TABLET: at 21:00

## 2017-01-25 RX ADMIN — CYANOCOBALAMIN TAB 500 MCG 1000 MCG: 500 TAB at 21:17

## 2017-01-25 RX ADMIN — OXYCODONE HYDROCHLORIDE 20 MG: 10 TABLET ORAL at 04:15

## 2017-01-25 RX ADMIN — CYANOCOBALAMIN TAB 500 MCG 1000 MCG: 500 TAB at 09:45

## 2017-01-25 RX ADMIN — OXYBUTYNIN CHLORIDE 10 MG: 10 TABLET, FILM COATED, EXTENDED RELEASE ORAL at 09:01

## 2017-01-25 RX ADMIN — LACTULOSE 15 ML: 10 SOLUTION ORAL at 21:19

## 2017-01-25 RX ADMIN — SODIUM CHLORIDE 1000 MG: 9 INJECTION, SOLUTION INTRAVENOUS at 09:02

## 2017-01-25 RX ADMIN — OXYCODONE HYDROCHLORIDE 20 MG: 10 TABLET ORAL at 09:02

## 2017-01-25 RX ADMIN — FLUOXETINE 10 MG: 10 CAPSULE ORAL at 09:02

## 2017-01-25 RX ADMIN — PROMETHAZINE HYDROCHLORIDE 25 MG: 25 TABLET ORAL at 10:19

## 2017-01-25 RX ADMIN — Medication 650 MG: at 09:02

## 2017-01-25 RX ADMIN — INSULIN LISPRO 2 UNITS: 100 INJECTION, SOLUTION INTRAVENOUS; SUBCUTANEOUS at 21:30

## 2017-01-25 RX ADMIN — GABAPENTIN 1200 MG: 400 CAPSULE ORAL at 21:18

## 2017-01-25 RX ADMIN — OMEPRAZOLE 20 MG: 20 CAPSULE, DELAYED RELEASE ORAL at 09:02

## 2017-01-25 RX ADMIN — OXYCODONE HYDROCHLORIDE 20 MG: 10 TABLET ORAL at 13:09

## 2017-01-25 RX ADMIN — CYCLOBENZAPRINE HYDROCHLORIDE 10 MG: 10 TABLET, FILM COATED ORAL at 21:17

## 2017-01-25 RX ADMIN — LACTULOSE 15 ML: 10 SOLUTION ORAL at 12:04

## 2017-01-25 RX ADMIN — DOCUSATE SODIUM 100 MG: 100 CAPSULE ORAL at 09:02

## 2017-01-25 RX ADMIN — LEVOTHYROXINE SODIUM 75 MCG: 75 TABLET ORAL at 05:38

## 2017-01-25 RX ADMIN — NAPROXEN 500 MG: 500 TABLET ORAL at 09:02

## 2017-01-25 RX ADMIN — ZIPRASIDONE HCL 160 MG: 40 CAPSULE ORAL at 21:15

## 2017-01-25 RX ADMIN — INSULIN LISPRO 3 UNITS: 100 INJECTION, SOLUTION INTRAVENOUS; SUBCUTANEOUS at 17:13

## 2017-01-25 RX ADMIN — OXYBUTYNIN CHLORIDE 10 MG: 10 TABLET, FILM COATED, EXTENDED RELEASE ORAL at 21:20

## 2017-01-25 RX ADMIN — OMEPRAZOLE 20 MG: 20 CAPSULE, DELAYED RELEASE ORAL at 21:18

## 2017-01-25 RX ADMIN — ENOXAPARIN SODIUM 40 MG: 100 INJECTION SUBCUTANEOUS at 09:02

## 2017-01-25 ASSESSMENT — ENCOUNTER SYMPTOMS
COUGH: 1
CHILLS: 0
NAUSEA: 0
SHORTNESS OF BREATH: 0
COUGH: 0
MYALGIAS: 1
SORE THROAT: 0
HEADACHES: 1
DIARRHEA: 0
SPEECH CHANGE: 0
SENSORY CHANGE: 1
DIZZINESS: 0
DEPRESSION: 1
FEVER: 0
HEADACHES: 0
WEAKNESS: 1
FOCAL WEAKNESS: 1
VOMITING: 0
WHEEZING: 0
BLURRED VISION: 0
SPEECH CHANGE: 1
SEIZURES: 0
HEARTBURN: 0
ABDOMINAL PAIN: 0

## 2017-01-25 ASSESSMENT — LIFESTYLE VARIABLES: DO YOU DRINK ALCOHOL: NO

## 2017-01-25 ASSESSMENT — PAIN SCALES - GENERAL
PAINLEVEL_OUTOF10: 9
PAINLEVEL_OUTOF10: 10
PAINLEVEL_OUTOF10: 10
PAINLEVEL_OUTOF10: 8
PAINLEVEL_OUTOF10: 10

## 2017-01-25 NOTE — CARE PLAN
Problem: Mobility  Goal: Risk for activity intolerance will decrease  Two person assist for Q2H turns; pillows used for offloading and comfort, low air loss mattress ordered.

## 2017-01-25 NOTE — CARE PLAN
Problem: Bowel/Gastric:  Goal: Normal bowel function is maintained or improved  Intervention: Educate patient and significant other/support system about diet, fluid intake, medications and activity to promote bowel function  Education provided regarding Opiod induced constipation. RN encouraged patient to drink more liquids, try to independently move limbs to increase activity and consume a high fiber diet to reduce further risk of constipation. Patient also gets scheduled stool softeners and PRN Lactulose daily.

## 2017-01-25 NOTE — DISCHARGE PLANNING
Transitional Care Navigator:    TCN met with patient and grandmother to discuss transitional care services for discharge planning.  SNF has been recommended.  Pt familiar with SNF from previous admission.  Choice is [facility].  Choice form faxed to [facility].  Sw aware.  TCN will follow-up as needed.

## 2017-01-25 NOTE — PROGRESS NOTES
"  Trauma/Surgical Progress Note    Author: Isidro Vigil Date & Time created: 1/25/2017   8:13 AM     Interval Events:  Pt and data reviewed 1/24  Counseled  Re : muscle biopsy  Will arrange timing with pathology and reference lab    ROS  Hemodynamics:  Blood pressure 124/65, pulse 82, temperature 36.3 °C (97.4 °F), resp. rate 17, height 1.575 m (5' 2.01\"), weight 116.574 kg (257 lb), last menstrual period 06/22/2016, SpO2 93 %.     Respiratory:    Respiration: 17, Pulse Oximetry: 93 %     Work Of Breathing / Effort: Mild  RUL Breath Sounds: Diminished, RML Breath Sounds: Diminished, RLL Breath Sounds: Diminished, MARY Breath Sounds: Diminished, LLL Breath Sounds: Diminished  Fluids:    Intake/Output Summary (Last 24 hours) at 01/25/17 0813  Last data filed at 01/25/17 0600   Gross per 24 hour   Intake    708 ml   Output   1200 ml   Net   -492 ml     Admit Weight: 116.574 kg (257 lb)  Current      Physical Exam    Medical Decision Making/Problem List:    Active Hospital Problems    Diagnosis   • Quadriparesis (CMS-HCC) [G82.50]     Priority: High   • HTN (hypertension) [I10]     Priority: Medium   • Chronic pain syndrome [G89.4]     Priority: Medium   • Dysphagia [R13.10]     Priority: Medium   • Chronic inflammatory arthritis [M19.90]     Priority: Medium   • Suprapubic catheter (CMS-HCC) [Z93.59]     Priority: Low   • Schizophrenia (CMS-HCC) [F20.9]     Priority: Low   • TONYA on CPAP [G47.33]     Priority: Low   • Hypothyroidism [E03.9]     Priority: Low     Core Measures & Quality Metrics:  Core Measures & Quality Metrics  MARLEE Score    "

## 2017-01-25 NOTE — PROGRESS NOTES
IMPRESSION  1. Spells of weakness affecting both legs, myelitis vs psychogenic  2. Not sure I would call this as typical mitochondrial disease-- anyway, it is reasonable to proceed with muscle biopsy    Management    Fine with steroid   Agree with muscle biopsy  Continue PT OT  Will preform basic blood tests    Filed Vitals:    01/24/17 2000 01/25/17 0400 01/25/17 0800 01/25/17 2000   BP: 133/74 124/65 135/81 135/72   Pulse: 85 82 86 59   Temp: 36.1 °C (96.9 °F) 36.3 °C (97.4 °F) 36.9 °C (98.4 °F) 36.6 °C (97.9 °F)   Resp: 17 17 17 16   Height:       Weight:       SpO2: 92% 93% 95% 94%     Physical Exam   Constitutional: She is oriented to person, place, and time and well-developed, well-nourished, and in no distress.   HENT:   Head: Normocephalic.   Eyes: Pupils are equal, round, and reactive to light.   Pulmonary/Chest: Effort normal.   Abdominal: Soft.   Musculoskeletal: Normal range of motion.   Neurological: She is alert and oriented to person, place, and time.   Muscle strength tests-- not consistent  Reflexes reserved   Skin: Skin is warm.       Review of Systems   Constitutional: Negative for fever.   Respiratory: Positive for cough.    Musculoskeletal: Positive for myalgias.   Neurological: Positive for speech change, focal weakness and headaches.   Psychiatric/Behavioral: Positive for depression and suicidal ideas.           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 Fox and has also been at [facility] with plans to undergo muscle biopsy    later this week.  She reports that she has chronic right-sided numbness;    however, over the past couple of days, numbness has become more severe.  She    has also noted weakness in her right upper and right lower extremity and left    lower extremity since last  night.  She is also complaining of headache that    started this morning.  She denies any dyspnea or chest pain.  She complains of   nausea.  No vomiting.  No diarrhea.  No melena or rectal bleeding.  She has a   chronic suprapubic catheter with good output.  She has not noted any    hematuria.  She presented to the emergency room for further evaluation.     REVIEW OF SYSTEMS:  As above, otherwise reviewed and negative.     PAST MEDICAL HISTORY:  Significant for:  1.  History of PCOS.  2.  Fibromyalgia.  3.  Chronic pain syndrome.  4.  History of suspected gastroparesis.  5.  History of dysphagia.  6.  Depression, anxiety and history of psychosis.  ________________________________________________________________________      7.  Urinary retention status post suprapubic catheter placements.  8.  History of latent TB.  9.  History of supraventricular tachycardia.

## 2017-01-25 NOTE — DISCHARGE PLANNING
Received choice form from [facility] at  [phone]  Referral sent to Sebastian Martinez at 1057 on 01/25/17.

## 2017-01-25 NOTE — CARE PLAN
Problem: Pain Management  Goal: Pain level will decrease to patient’s comfort goal  Outcome: PROGRESSING SLOWER THAN EXPECTED  Patient educated on pharmacological and nonpharmacologic interventions, medicated per MD order. Frequent repositioning, Ice and heat applied.

## 2017-01-26 ENCOUNTER — TELEPHONE (OUTPATIENT)
Dept: MEDICAL GROUP | Facility: MEDICAL CENTER | Age: 27
End: 2017-01-26

## 2017-01-26 LAB
CRP SERPL HS-MCNC: 0.08 MG/DL (ref 0–0.75)
ERYTHROCYTE [SEDIMENTATION RATE] IN BLOOD BY WESTERGREN METHOD: 10 MM/HOUR (ref 0–20)
GLUCOSE BLD-MCNC: 103 MG/DL (ref 65–99)
GLUCOSE BLD-MCNC: 137 MG/DL (ref 65–99)
GLUCOSE BLD-MCNC: 139 MG/DL (ref 65–99)
GLUCOSE BLD-MCNC: 185 MG/DL (ref 65–99)
GLUCOSE BLD-MCNC: 197 MG/DL (ref 65–99)
THYROPEROXIDASE AB SERPL-ACNC: 133.2 IU/ML (ref 0–9)
VIT B12 SERPL-MCNC: 1228 PG/ML (ref 211–911)

## 2017-01-26 PROCEDURE — 86038 ANTINUCLEAR ANTIBODIES: CPT

## 2017-01-26 PROCEDURE — 94660 CPAP INITIATION&MGMT: CPT

## 2017-01-26 PROCEDURE — A9270 NON-COVERED ITEM OR SERVICE: HCPCS

## 2017-01-26 PROCEDURE — 160002 HCHG RECOVERY MINUTES (STAT): Performed by: SURGERY

## 2017-01-26 PROCEDURE — 82962 GLUCOSE BLOOD TEST: CPT | Mod: 91

## 2017-01-26 PROCEDURE — 92610 EVALUATE SWALLOWING FUNCTION: CPT

## 2017-01-26 PROCEDURE — G8997 SWALLOW GOAL STATUS: HCPCS | Mod: CI

## 2017-01-26 PROCEDURE — 700101 HCHG RX REV CODE 250

## 2017-01-26 PROCEDURE — 88313 SPECIAL STAINS GROUP 2: CPT

## 2017-01-26 PROCEDURE — 110371 HCHG SHELL REV 272: Performed by: SURGERY

## 2017-01-26 PROCEDURE — 700102 HCHG RX REV CODE 250 W/ 637 OVERRIDE(OP): Performed by: FAMILY MEDICINE

## 2017-01-26 PROCEDURE — 160048 HCHG OR STATISTICAL LEVEL 1-5: Performed by: SURGERY

## 2017-01-26 PROCEDURE — A9270 NON-COVERED ITEM OR SERVICE: HCPCS | Performed by: HOSPITALIST

## 2017-01-26 PROCEDURE — 88348 ELECTRON MICROSCOPY DX: CPT

## 2017-01-26 PROCEDURE — 700105 HCHG RX REV CODE 258: Performed by: HOSPITALIST

## 2017-01-26 PROCEDURE — 700102 HCHG RX REV CODE 250 W/ 637 OVERRIDE(OP): Performed by: HOSPITALIST

## 2017-01-26 PROCEDURE — 85652 RBC SED RATE AUTOMATED: CPT

## 2017-01-26 PROCEDURE — 160009 HCHG ANES TIME/MIN: Performed by: SURGERY

## 2017-01-26 PROCEDURE — 700102 HCHG RX REV CODE 250 W/ 637 OVERRIDE(OP): Performed by: NURSE PRACTITIONER

## 2017-01-26 PROCEDURE — G8996 SWALLOW CURRENT STATUS: HCPCS | Mod: CJ

## 2017-01-26 PROCEDURE — 770006 HCHG ROOM/CARE - MED/SURG/GYN SEMI*

## 2017-01-26 PROCEDURE — 88300 SURGICAL PATH GROSS: CPT

## 2017-01-26 PROCEDURE — 86376 MICROSOMAL ANTIBODY EACH: CPT

## 2017-01-26 PROCEDURE — 700112 HCHG RX REV CODE 229: Performed by: HOSPITALIST

## 2017-01-26 PROCEDURE — 160035 HCHG PACU - 1ST 60 MINS PHASE I: Performed by: SURGERY

## 2017-01-26 PROCEDURE — 86140 C-REACTIVE PROTEIN: CPT

## 2017-01-26 PROCEDURE — 88305 TISSUE EXAM BY PATHOLOGIST: CPT

## 2017-01-26 PROCEDURE — 500888 HCHG PACK, MINOR BASIN: Performed by: SURGERY

## 2017-01-26 PROCEDURE — 160028 HCHG SURGERY MINUTES - 1ST 30 MINS LEVEL 3: Performed by: SURGERY

## 2017-01-26 PROCEDURE — 82607 VITAMIN B-12: CPT

## 2017-01-26 PROCEDURE — 700102 HCHG RX REV CODE 250 W/ 637 OVERRIDE(OP)

## 2017-01-26 PROCEDURE — A9270 NON-COVERED ITEM OR SERVICE: HCPCS | Performed by: NURSE PRACTITIONER

## 2017-01-26 PROCEDURE — 110382 HCHG SHELL REV 271: Performed by: SURGERY

## 2017-01-26 PROCEDURE — 86255 FLUORESCENT ANTIBODY SCREEN: CPT

## 2017-01-26 PROCEDURE — 83516 IMMUNOASSAY NONANTIBODY: CPT | Mod: 91

## 2017-01-26 PROCEDURE — 99232 SBSQ HOSP IP/OBS MODERATE 35: CPT | Performed by: FAMILY MEDICINE

## 2017-01-26 PROCEDURE — 88341 IMHCHEM/IMCYTCHM EA ADD ANTB: CPT

## 2017-01-26 PROCEDURE — 501838 HCHG SUTURE GENERAL: Performed by: SURGERY

## 2017-01-26 PROCEDURE — 36415 COLL VENOUS BLD VENIPUNCTURE: CPT

## 2017-01-26 PROCEDURE — 82595 ASSAY OF CRYOGLOBULIN: CPT

## 2017-01-26 PROCEDURE — A4606 OXYGEN PROBE USED W OXIMETER: HCPCS | Performed by: SURGERY

## 2017-01-26 PROCEDURE — 700111 HCHG RX REV CODE 636 W/ 250 OVERRIDE (IP)

## 2017-01-26 PROCEDURE — 88342 IMHCHEM/IMCYTCHM 1ST ANTB: CPT

## 2017-01-26 PROCEDURE — 160039 HCHG SURGERY MINUTES - EA ADDL 1 MIN LEVEL 3: Performed by: SURGERY

## 2017-01-26 PROCEDURE — 700111 HCHG RX REV CODE 636 W/ 250 OVERRIDE (IP): Performed by: HOSPITALIST

## 2017-01-26 PROCEDURE — A9270 NON-COVERED ITEM OR SERVICE: HCPCS | Performed by: FAMILY MEDICINE

## 2017-01-26 PROCEDURE — 502240 HCHG MISC OR SUPPLY RC 0272: Performed by: SURGERY

## 2017-01-26 RX ORDER — PREDNISONE 50 MG/1
50 TABLET ORAL DAILY
Status: DISCONTINUED | OUTPATIENT
Start: 2017-01-27 | End: 2017-01-30

## 2017-01-26 RX ORDER — OXYCODONE HYDROCHLORIDE 5 MG/1
TABLET ORAL
Status: COMPLETED
Start: 2017-01-26 | End: 2017-01-26

## 2017-01-26 RX ORDER — MAGNESIUM HYDROXIDE 1200 MG/15ML
LIQUID ORAL
Status: DISCONTINUED | OUTPATIENT
Start: 2017-01-26 | End: 2017-01-26 | Stop reason: HOSPADM

## 2017-01-26 RX ORDER — MIDAZOLAM HYDROCHLORIDE 1 MG/ML
INJECTION INTRAMUSCULAR; INTRAVENOUS
Status: DISPENSED
Start: 2017-01-26 | End: 2017-01-26

## 2017-01-26 RX ORDER — BUPIVACAINE HYDROCHLORIDE AND EPINEPHRINE 2.5; 5 MG/ML; UG/ML
INJECTION, SOLUTION EPIDURAL; INFILTRATION; INTRACAUDAL; PERINEURAL
Status: DISCONTINUED | OUTPATIENT
Start: 2017-01-26 | End: 2017-01-26 | Stop reason: HOSPADM

## 2017-01-26 RX ADMIN — OMEPRAZOLE 20 MG: 20 CAPSULE, DELAYED RELEASE ORAL at 08:45

## 2017-01-26 RX ADMIN — OXYCODONE HYDROCHLORIDE 20 MG: 10 TABLET ORAL at 21:38

## 2017-01-26 RX ADMIN — CYCLOBENZAPRINE HYDROCHLORIDE 10 MG: 10 TABLET, FILM COATED ORAL at 19:47

## 2017-01-26 RX ADMIN — INSULIN LISPRO 2 UNITS: 100 INJECTION, SOLUTION INTRAVENOUS; SUBCUTANEOUS at 12:35

## 2017-01-26 RX ADMIN — FLUOXETINE 10 MG: 10 CAPSULE ORAL at 08:44

## 2017-01-26 RX ADMIN — INSULIN LISPRO 2 UNITS: 100 INJECTION, SOLUTION INTRAVENOUS; SUBCUTANEOUS at 17:27

## 2017-01-26 RX ADMIN — Medication 650 MG: at 16:02

## 2017-01-26 RX ADMIN — Medication 1 TABLET: at 19:46

## 2017-01-26 RX ADMIN — OXYCODONE HYDROCHLORIDE 20 MG: 5 TABLET ORAL at 11:45

## 2017-01-26 RX ADMIN — ZIPRASIDONE HCL 160 MG: 40 CAPSULE ORAL at 19:49

## 2017-01-26 RX ADMIN — OXYCODONE HYDROCHLORIDE 20 MG: 10 TABLET ORAL at 17:24

## 2017-01-26 RX ADMIN — GABAPENTIN 600 MG: 400 CAPSULE ORAL at 08:44

## 2017-01-26 RX ADMIN — SODIUM CHLORIDE: 9 INJECTION, SOLUTION INTRAVENOUS at 21:39

## 2017-01-26 RX ADMIN — OXYCODONE HYDROCHLORIDE 20 MG: 10 TABLET ORAL at 07:50

## 2017-01-26 RX ADMIN — FENTANYL 1 PATCH: 25 PATCH TRANSDERMAL at 19:49

## 2017-01-26 RX ADMIN — OMEPRAZOLE 20 MG: 20 CAPSULE, DELAYED RELEASE ORAL at 19:47

## 2017-01-26 RX ADMIN — OXYCODONE HYDROCHLORIDE 20 MG: 10 TABLET ORAL at 03:48

## 2017-01-26 RX ADMIN — LACTULOSE 15 ML: 10 SOLUTION ORAL at 13:01

## 2017-01-26 RX ADMIN — OXYBUTYNIN CHLORIDE 10 MG: 10 TABLET, FILM COATED, EXTENDED RELEASE ORAL at 08:44

## 2017-01-26 RX ADMIN — NAPROXEN 500 MG: 500 TABLET ORAL at 19:47

## 2017-01-26 RX ADMIN — GABAPENTIN 1200 MG: 400 CAPSULE ORAL at 19:43

## 2017-01-26 RX ADMIN — CYANOCOBALAMIN TAB 500 MCG 1000 MCG: 500 TAB at 19:46

## 2017-01-26 RX ADMIN — NAPROXEN 500 MG: 500 TABLET ORAL at 12:34

## 2017-01-26 RX ADMIN — LACTULOSE 15 ML: 10 SOLUTION ORAL at 19:38

## 2017-01-26 RX ADMIN — DOCUSATE SODIUM 100 MG: 100 CAPSULE ORAL at 08:44

## 2017-01-26 RX ADMIN — Medication 650 MG: at 19:44

## 2017-01-26 RX ADMIN — SODIUM CHLORIDE 1000 MG: 9 INJECTION, SOLUTION INTRAVENOUS at 08:51

## 2017-01-26 RX ADMIN — Medication 650 MG: at 08:44

## 2017-01-26 RX ADMIN — CYANOCOBALAMIN TAB 500 MCG 1000 MCG: 500 TAB at 08:44

## 2017-01-26 RX ADMIN — OXYCODONE HYDROCHLORIDE 20 MG: 10 TABLET ORAL at 11:45

## 2017-01-26 RX ADMIN — LEVOTHYROXINE SODIUM 75 MCG: 75 TABLET ORAL at 05:31

## 2017-01-26 RX ADMIN — OXYBUTYNIN CHLORIDE 10 MG: 10 TABLET, FILM COATED, EXTENDED RELEASE ORAL at 19:48

## 2017-01-26 RX ADMIN — CYCLOBENZAPRINE HYDROCHLORIDE 10 MG: 10 TABLET, FILM COATED ORAL at 08:44

## 2017-01-26 ASSESSMENT — ENCOUNTER SYMPTOMS
FEVER: 0
HEADACHES: 0
NAUSEA: 0
SENSORY CHANGE: 1
SEIZURES: 0
CHILLS: 0
BLURRED VISION: 0
DIZZINESS: 0
WHEEZING: 0
DIARRHEA: 0
SORE THROAT: 0
SHORTNESS OF BREATH: 0
VOMITING: 0
WEAKNESS: 1
ABDOMINAL PAIN: 0
HEARTBURN: 0
SPEECH CHANGE: 0
COUGH: 0
FOCAL WEAKNESS: 1

## 2017-01-26 ASSESSMENT — PAIN SCALES - GENERAL
PAINLEVEL_OUTOF10: 9
PAINLEVEL_OUTOF10: 8
PAINLEVEL_OUTOF10: 9
PAINLEVEL_OUTOF10: 8
PAINLEVEL_OUTOF10: 8
PAINLEVEL_OUTOF10: 9
PAINLEVEL_OUTOF10: 8
PAINLEVEL_OUTOF10: 8

## 2017-01-26 NOTE — TELEPHONE ENCOUNTER
1. Caller Name: Kristin Balderrama                                           Call Back Number: [phone] (home)         Patient approves a detailed voicemail message: N\A    Patient grandmother Vivienne, stated that the patient was admitted to [facility] on Sunday 1/22, due to not being able to move her legs, she is scheduled to have a muscle biopsy this morning, and after she is released from the hospital she will go to a skilled nursing facility.

## 2017-01-26 NOTE — PROGRESS NOTES
"  Trauma/Surgical Progress Note    Author: Isidro Vigil Date & Time created: 1/26/2017   9:46 AM     Interval Events:  For muscle biopsy this am     ROS  Hemodynamics:  Blood pressure 128/75, pulse 72, temperature 36.3 °C (97.4 °F), resp. rate 16, height 1.575 m (5' 2.01\"), weight 116.574 kg (257 lb), last menstrual period 06/22/2016, SpO2 94 %.     Respiratory:    Respiration: 16, Pulse Oximetry: 94 %     Work Of Breathing / Effort: Mild  RUL Breath Sounds: Diminished, RML Breath Sounds: Diminished, RLL Breath Sounds: Diminished, MARY Breath Sounds: Diminished, LLL Breath Sounds: Diminished  Fluids:    Intake/Output Summary (Last 24 hours) at 01/26/17 0946  Last data filed at 01/26/17 0400   Gross per 24 hour   Intake      0 ml   Output   1850 ml   Net  -1850 ml     Admit Weight: 116.574 kg (257 lb)  Current      Physical Exam    Medical Decision Making/Problem List:    Active Hospital Problems    Diagnosis   • Quadriparesis (CMS-HCC) [G82.50]     Priority: High   • HTN (hypertension) [I10]     Priority: Medium   • Chronic pain syndrome [G89.4]     Priority: Medium   • Dysphagia [R13.10]     Priority: Medium   • Chronic inflammatory arthritis [M19.90]     Priority: Medium   • Suprapubic catheter (CMS-HCC) [Z93.59]     Priority: Low   • Schizophrenia (CMS-HCC) [F20.9]     Priority: Low   • TONYA on CPAP [G47.33]     Priority: Low   • Hypothyroidism [E03.9]     Priority: Low     Core Measures & Quality Metrics:  Core Measures & Quality Metrics  MARLEE Score      "

## 2017-01-26 NOTE — PROGRESS NOTES
Pt placed on CPAP as she is going to sleep. Reports pain and need for turning. Pt given pain medication and turned.  Will continue to monitor, assist and medicate per MAR for duration of shift.  Pt resting with eyes closed, CPAP on for majority of shift or shift. Repositioned more frequently than q2.

## 2017-01-26 NOTE — CARE PLAN
Problem: Infection  Goal: Will remain free from infection  Outcome: PROGRESSING AS EXPECTED  Intervention: Assess signs and symptoms of infection  Assessed for signs and symptoms of infection.   Intervention: Implement standard precautions and perform hand washing before and after patient contact  Standard precautions and hand washing performed before and after patient contact per protocol.    Intervention: Assess for removal of potential routes of infection, such as IV, central line, intra-arterial or urinary catheters  Removal of potential routes of infection assessed.       Problem: Bowel/Gastric:  Goal: Normal bowel function is maintained or improved  Outcome: PROGRESSING SLOWER THAN EXPECTED  Intervention: Educate patient and significant other/support system about diet, fluid intake, medications and activity to promote bowel function  Pt educated about diet, fluid intake, medications and activity to promote bowel function.   Intervention: Educate patient and significant other/support system about signs and symptoms of constipation and interventions to implement  Pt educated about signs and symptoms of constipation and interventions to implement.

## 2017-01-26 NOTE — OR NURSING
Patient complains of pain, medicated per MAR. Expresses desire to return to room and eat lunch. VSS. Report called to Ruth Ann HASSAN. Patient updated on plan of care.

## 2017-01-26 NOTE — CARE PLAN
Problem: Discharge Barriers/Planning  Goal: Patient’s continuum of care needs will be met  Outcome: PROGRESSING AS EXPECTED  SW involved. Referral sent to [facility].     Problem: Pain Management  Goal: Pain level will decrease to patient’s comfort goal  Outcome: PROGRESSING AS EXPECTED  Medicating per MAR.

## 2017-01-27 LAB
ANCA IGG TITR SER IF: NORMAL {TITER}
ANION GAP SERPL CALC-SCNC: 8 MMOL/L (ref 0–11.9)
BUN SERPL-MCNC: 21 MG/DL (ref 8–22)
CALCIUM SERPL-MCNC: 8.4 MG/DL (ref 8.5–10.5)
CHLORIDE SERPL-SCNC: 103 MMOL/L (ref 96–112)
CO2 SERPL-SCNC: 25 MMOL/L (ref 20–33)
CREAT SERPL-MCNC: 0.63 MG/DL (ref 0.5–1.4)
GFR SERPL CREATININE-BSD FRML MDRD: >60 ML/MIN/1.73 M 2
GLUCOSE BLD-MCNC: 105 MG/DL (ref 65–99)
GLUCOSE BLD-MCNC: 108 MG/DL (ref 65–99)
GLUCOSE BLD-MCNC: 149 MG/DL (ref 65–99)
GLUCOSE BLD-MCNC: 151 MG/DL (ref 65–99)
GLUCOSE SERPL-MCNC: 148 MG/DL (ref 65–99)
NUCLEAR IGG SER QL IA: NORMAL
POTASSIUM SERPL-SCNC: 4.2 MMOL/L (ref 3.6–5.5)
SODIUM SERPL-SCNC: 136 MMOL/L (ref 135–145)

## 2017-01-27 PROCEDURE — 700111 HCHG RX REV CODE 636 W/ 250 OVERRIDE (IP): Performed by: HOSPITALIST

## 2017-01-27 PROCEDURE — 700102 HCHG RX REV CODE 250 W/ 637 OVERRIDE(OP): Performed by: HOSPITALIST

## 2017-01-27 PROCEDURE — 770006 HCHG ROOM/CARE - MED/SURG/GYN SEMI*

## 2017-01-27 PROCEDURE — 97530 THERAPEUTIC ACTIVITIES: CPT

## 2017-01-27 PROCEDURE — 97112 NEUROMUSCULAR REEDUCATION: CPT

## 2017-01-27 PROCEDURE — 700101 HCHG RX REV CODE 250: Performed by: HOSPITALIST

## 2017-01-27 PROCEDURE — A9270 NON-COVERED ITEM OR SERVICE: HCPCS | Performed by: HOSPITALIST

## 2017-01-27 PROCEDURE — 0KBQ0ZX EXCISION OF RIGHT UPPER LEG MUSCLE, OPEN APPROACH, DIAGNOSTIC: ICD-10-PCS | Performed by: SURGERY

## 2017-01-27 PROCEDURE — 700112 HCHG RX REV CODE 229: Performed by: HOSPITALIST

## 2017-01-27 PROCEDURE — 80048 BASIC METABOLIC PNL TOTAL CA: CPT

## 2017-01-27 PROCEDURE — A9270 NON-COVERED ITEM OR SERVICE: HCPCS | Performed by: FAMILY MEDICINE

## 2017-01-27 PROCEDURE — 700111 HCHG RX REV CODE 636 W/ 250 OVERRIDE (IP): Performed by: FAMILY MEDICINE

## 2017-01-27 PROCEDURE — 99233 SBSQ HOSP IP/OBS HIGH 50: CPT | Performed by: FAMILY MEDICINE

## 2017-01-27 PROCEDURE — 700102 HCHG RX REV CODE 250 W/ 637 OVERRIDE(OP): Performed by: FAMILY MEDICINE

## 2017-01-27 PROCEDURE — 36415 COLL VENOUS BLD VENIPUNCTURE: CPT

## 2017-01-27 PROCEDURE — 82962 GLUCOSE BLOOD TEST: CPT | Mod: 91

## 2017-01-27 RX ORDER — POLYETHYLENE GLYCOL 3350 17 G/17G
1 POWDER, FOR SOLUTION ORAL DAILY
Status: DISCONTINUED | OUTPATIENT
Start: 2017-01-27 | End: 2017-02-01 | Stop reason: HOSPADM

## 2017-01-27 RX ADMIN — ZIPRASIDONE HCL 160 MG: 40 CAPSULE ORAL at 20:45

## 2017-01-27 RX ADMIN — CYCLOBENZAPRINE HYDROCHLORIDE 10 MG: 10 TABLET, FILM COATED ORAL at 20:47

## 2017-01-27 RX ADMIN — ERGOCALCIFEROL 50000 UNITS: 1.25 CAPSULE ORAL at 08:14

## 2017-01-27 RX ADMIN — ENOXAPARIN SODIUM 40 MG: 100 INJECTION SUBCUTANEOUS at 08:12

## 2017-01-27 RX ADMIN — GABAPENTIN 1200 MG: 400 CAPSULE ORAL at 20:49

## 2017-01-27 RX ADMIN — OXYBUTYNIN CHLORIDE 10 MG: 10 TABLET, FILM COATED, EXTENDED RELEASE ORAL at 20:46

## 2017-01-27 RX ADMIN — CYANOCOBALAMIN TAB 500 MCG 1000 MCG: 500 TAB at 20:47

## 2017-01-27 RX ADMIN — Medication 1 TABLET: at 20:47

## 2017-01-27 RX ADMIN — OXYCODONE HYDROCHLORIDE 20 MG: 10 TABLET ORAL at 20:45

## 2017-01-27 RX ADMIN — OXYCODONE HYDROCHLORIDE 20 MG: 10 TABLET ORAL at 15:59

## 2017-01-27 RX ADMIN — OXYCODONE HYDROCHLORIDE 20 MG: 10 TABLET ORAL at 02:37

## 2017-01-27 RX ADMIN — PREDNISONE 50 MG: 50 TABLET ORAL at 08:12

## 2017-01-27 RX ADMIN — MAGNESIUM HYDROXIDE 30 ML: 400 SUSPENSION ORAL at 15:59

## 2017-01-27 RX ADMIN — CYANOCOBALAMIN TAB 500 MCG 1000 MCG: 500 TAB at 08:12

## 2017-01-27 RX ADMIN — NAPROXEN 500 MG: 500 TABLET ORAL at 20:46

## 2017-01-27 RX ADMIN — BISACODYL 10 MG: 10 SUPPOSITORY RECTAL at 09:54

## 2017-01-27 RX ADMIN — LACTULOSE 15 ML: 10 SOLUTION ORAL at 08:12

## 2017-01-27 RX ADMIN — INSULIN LISPRO 2 UNITS: 100 INJECTION, SOLUTION INTRAVENOUS; SUBCUTANEOUS at 17:53

## 2017-01-27 RX ADMIN — SODIUM PHOSPHATE 133 ML: 7; 19 ENEMA RECTAL at 12:15

## 2017-01-27 RX ADMIN — Medication 650 MG: at 20:45

## 2017-01-27 RX ADMIN — OMEPRAZOLE 20 MG: 20 CAPSULE, DELAYED RELEASE ORAL at 20:46

## 2017-01-27 RX ADMIN — DOCUSATE SODIUM 100 MG: 100 CAPSULE ORAL at 08:12

## 2017-01-27 RX ADMIN — NAPROXEN 500 MG: 500 TABLET ORAL at 08:14

## 2017-01-27 RX ADMIN — OXYCODONE HYDROCHLORIDE 20 MG: 10 TABLET ORAL at 06:36

## 2017-01-27 RX ADMIN — OXYBUTYNIN CHLORIDE 10 MG: 10 TABLET, FILM COATED, EXTENDED RELEASE ORAL at 08:14

## 2017-01-27 RX ADMIN — CYCLOBENZAPRINE HYDROCHLORIDE 10 MG: 10 TABLET, FILM COATED ORAL at 08:12

## 2017-01-27 RX ADMIN — LEVOTHYROXINE SODIUM 75 MCG: 75 TABLET ORAL at 06:35

## 2017-01-27 RX ADMIN — FLUOXETINE 10 MG: 10 CAPSULE ORAL at 08:14

## 2017-01-27 RX ADMIN — Medication 650 MG: at 08:14

## 2017-01-27 RX ADMIN — Medication 650 MG: at 16:07

## 2017-01-27 RX ADMIN — OXYCODONE HYDROCHLORIDE 20 MG: 10 TABLET ORAL at 10:53

## 2017-01-27 RX ADMIN — POLYETHYLENE GLYCOL 3350 1 PACKET: 17 POWDER, FOR SOLUTION ORAL at 15:59

## 2017-01-27 RX ADMIN — GABAPENTIN 600 MG: 400 CAPSULE ORAL at 08:12

## 2017-01-27 RX ADMIN — OMEPRAZOLE 20 MG: 20 CAPSULE, DELAYED RELEASE ORAL at 08:11

## 2017-01-27 RX ADMIN — LACTULOSE 15 ML: 10 SOLUTION ORAL at 20:47

## 2017-01-27 ASSESSMENT — ENCOUNTER SYMPTOMS
SPEECH CHANGE: 0
FOCAL WEAKNESS: 1
ABDOMINAL PAIN: 0
WHEEZING: 0
SEIZURES: 0
DEPRESSION: 1
SENSORY CHANGE: 1
MYALGIAS: 1
COUGH: 1
CHILLS: 0
FEVER: 0
NAUSEA: 0
HEARTBURN: 0
DIARRHEA: 0
DIZZINESS: 0
HEADACHES: 1
SORE THROAT: 0
SPEECH CHANGE: 1
HEADACHES: 0
VOMITING: 0
BLURRED VISION: 0
SHORTNESS OF BREATH: 0
WEAKNESS: 1
COUGH: 0

## 2017-01-27 ASSESSMENT — PAIN SCALES - GENERAL
PAINLEVEL_OUTOF10: 9
PAINLEVEL_OUTOF10: 9
PAINLEVEL_OUTOF10: 8

## 2017-01-27 ASSESSMENT — GAIT ASSESSMENTS: GAIT LEVEL OF ASSIST: UNABLE TO PARTICIPATE

## 2017-01-27 NOTE — DISCHARGE PLANNING
Received call from [facility] at [facility], per their management they are requesting a Psych  Eval be completed for they will accept patient due to behaviors.  Per [facility] they will need to place patient in a Pvt room due to age.  [facility] Tamayo has been advised.

## 2017-01-27 NOTE — CARE PLAN
Problem: Safety  Goal: Will remain free from injury  Outcome: PROGRESSING AS EXPECTED    Problem: Infection  Goal: Will remain free from infection  Outcome: PROGRESSING AS EXPECTED  Intervention: Assess signs and symptoms of infection  Assessed for signs and symptoms of infection.   Intervention: Implement standard precautions and perform hand washing before and after patient contact  Standard precautions and hand washing performed before and after patient contact per protocol.    Intervention: Assess for removal of potential routes of infection, such as IV, central line, intra-arterial or urinary catheters  Removal of potential routes of infection assessed.

## 2017-01-27 NOTE — DISCHARGE PLANNING
Spoke with [facility] at [facility], they will review referral for possible admission on 01-28-17.

## 2017-01-27 NOTE — CARE PLAN
Problem: Pain Management  Goal: Pain level will decrease to patient’s comfort goal  Outcome: PROGRESSING AS EXPECTED  Medicating per MAR. Placing heating packs and ice packs per pt request. Assisting pt with Q2H turns.    Problem: Respiratory:  Goal: Respiratory status will improve  Outcome: PROGRESSING AS EXPECTED  3L O2 removed. O2 sat 94% room air. Wears CPAP at night.

## 2017-01-27 NOTE — PROGRESS NOTES
Hospital Medicine Progress Note, Adult, Complex               Author: Lalo Vernon Date & Time created: 1/26/2017  4:06 PM     Interval History:  Admitted for Quadriparesis.    Quadriparesis - minimal improvement, able to move toes more, muscle biopsy today  Pain - better controlled  HTN - BP controlled  Dysphagia - swallow eval done    Review of Systems:  Review of Systems   Constitutional: Positive for malaise/fatigue. Negative for fever and chills.   HENT: Negative for sore throat.    Eyes: Negative for blurred vision.   Respiratory: Negative for cough, shortness of breath and wheezing.    Cardiovascular: Negative for chest pain.   Gastrointestinal: Negative for heartburn, nausea, vomiting, abdominal pain and diarrhea.   Musculoskeletal: Positive for joint pain.   Neurological: Positive for sensory change, focal weakness and weakness. Negative for dizziness, speech change, seizures and headaches.       Physical Exam:  Physical Exam   Constitutional: She is oriented to person, place, and time. She appears well-developed.   Morbidly obese   HENT:   Head: Normocephalic and atraumatic.   Eyes: Conjunctivae are normal. Pupils are equal, round, and reactive to light.   Neck: No tracheal deviation present. No thyromegaly present.   Cardiovascular: Normal rate and regular rhythm.    Pulmonary/Chest: Effort normal and breath sounds normal.   Abdominal: Soft. Bowel sounds are normal. She exhibits no distension. There is no tenderness.   Musculoskeletal: She exhibits edema.   Lymphadenopathy:     She has no cervical adenopathy.   Neurological: She is alert and oriented to person, place, and time. No cranial nerve deficit.   MMT RUE 3/5, RLE 0-1/5, LUE 4+/5, LLE 0-1/5   Skin: Skin is warm and dry.   Nursing note and vitals reviewed.      Labs:        Invalid input(s): [id number]      Recent Labs      01/25/17   0814   SODIUM  139   POTASSIUM  4.0   CHLORIDE  105   CO2  26   BUN  24*   CREATININE  0.65   CALCIUM   9.0     Recent Labs      17   0814   GLUCOSE  137*     No results for input(s): RBC, HEMOGLOBIN, HEMATOCRIT, PLATELETCT, PROTHROMBTM, APTT, INR, IRON, FERRITIN, TOTIRONBC in the last 72 hours.            Hemodynamics:  Temp (24hrs), Av.8 °C (98.3 °F), Min:36.3 °C (97.4 °F), Max:37.3 °C (99.1 °F)  Temperature: 37.1 °C (98.8 °F)  Pulse  Av.6  Min: 59  Max: 108Heart Rate (Monitored): 81  Blood Pressure: 151/81 mmHg, NIBP: 133/61 mmHg     Respiratory:    Respiration: 16, Pulse Oximetry: 96 %     Work Of Breathing / Effort: Mild  RUL Breath Sounds: Diminished, RML Breath Sounds: Diminished, RLL Breath Sounds: Diminished, MARY Breath Sounds: Diminished, LLL Breath Sounds: Diminished  Fluids:    Intake/Output Summary (Last 24 hours) at 17 1606  Last data filed at 17 1200   Gross per 24 hour   Intake    650 ml   Output   1850 ml   Net  -1200 ml        GI/Nutrition:  Orders Placed This Encounter   Procedures   • Diet Order     Standing Status: Standing      Number of Occurrences: 1      Standing Expiration Date:      Order Specific Question:  Diet:     Answer:  Diabetic [3]     Order Specific Question:  Texture/Fiber modifications:     Answer:  Dysphagia 2(Pureed/Chopped)specify fluid consistency(question 6) [2]     Order Specific Question:  Consistency/Fluid modifications:     Answer:  Thin Liquids [3]     Medical Decision Making, by Problem:  Active Hospital Problems    Diagnosis   • *Quadriparesis (CMS-HCC) [G82.50]   IV Solumedrol to finish today, Neurology following, muscle biopsy   • HTN (hypertension) [I10]     No current meds   • Chronic pain syndrome [G89.4]     Fentanyl patch, increased Oxycodone, continue Neurontin   • Dysphagia [R13.10]      Dysphagia 2   • Chronic inflammatory arthritis [M19.90]     previously on Plaquenil   • Suprapubic catheter (CMS-HCC) [Z93.59]          • Schizophrenia (CMS-HCC) [F20.9]   Geodon, Prozac   • TONYA on CPAP [G47.33]        May use home cpap   •  Hypothyroidism [E03.9]       Synthroid       Medications reviewed, Labs reviewed, Radiology images reviewed and EKG reviewed  Andrade catheter: Neurogenic Bladder      DVT Prophylaxis: Enoxaparin (Lovenox)    Ulcer prophylaxis: Yes    Assessed for rehab: Patient was assess for and/or received rehabilitation services during this hospitalization

## 2017-01-27 NOTE — PROGRESS NOTES
Pt reports no BM since 1/22/17. Scheduled colace and Lactulose administered per MAR. Suppository given per MAR, no results and pt reported feeling uncomfortable and requested enema. Enema given per MAR. Pt reported having the urge to have a BM, expelled enema fluid. No BM at this time.

## 2017-01-27 NOTE — DISCHARGE PLANNING
Referral: SNF    Intervention: Per [facility], [facility] requested a full Psych Evaluation prior to [facility]    Plan: As Above.

## 2017-01-27 NOTE — PROGRESS NOTES
Hospital Medicine Progress Note, Adult, Complex               Author: Lalo Vernon Date & Time created: 1/27/2017  3:29 PM     Interval History:  Admitted for Quadriparesis.    Quadriparesis - able to move legs a little bit more, muscle biopsy today, finished IV Solumedrol  Pain - better controlled  HTN - BP controlled  Dysphagia - swallow eval done    Review of Systems:  Review of Systems   Constitutional: Positive for malaise/fatigue. Negative for fever and chills.   HENT: Negative for sore throat.    Eyes: Negative for blurred vision.   Respiratory: Negative for cough, shortness of breath and wheezing.    Cardiovascular: Negative for chest pain.   Gastrointestinal: Negative for heartburn, nausea, vomiting, abdominal pain and diarrhea.   Musculoskeletal: Positive for joint pain.   Neurological: Positive for sensory change, focal weakness and weakness. Negative for dizziness, speech change, seizures and headaches.       Physical Exam:  Physical Exam   Constitutional: She is oriented to person, place, and time. She appears well-developed.   Morbidly obese   HENT:   Head: Normocephalic and atraumatic.   Eyes: Conjunctivae are normal. Pupils are equal, round, and reactive to light.   Neck: No tracheal deviation present. No thyromegaly present.   Cardiovascular: Normal rate and regular rhythm.    Pulmonary/Chest: Effort normal and breath sounds normal.   Abdominal: Soft. Bowel sounds are normal. She exhibits no distension. There is no tenderness.   Musculoskeletal: She exhibits edema.   Incision at midline R thigh   Lymphadenopathy:     She has no cervical adenopathy.   Neurological: She is alert and oriented to person, place, and time. No cranial nerve deficit.   MMT RUE 3/5, RLE 0-1/5, LUE 4+/5, LLE 0-1/5   Skin: Skin is warm and dry.   Nursing note and vitals reviewed.      Labs:        Invalid input(s): [id number]      Recent Labs      01/25/17   0814  01/27/17   0230   SODIUM  139  136   POTASSIUM  4.0   4.2   CHLORIDE  105  103   CO2  26  25   BUN  24*  21   CREATININE  0.65  0.63   CALCIUM  9.0  8.4*     Recent Labs      17   0814  17   0230   GLUCOSE  137*  148*     No results for input(s): RBC, HEMOGLOBIN, HEMATOCRIT, PLATELETCT, PROTHROMBTM, APTT, INR, IRON, FERRITIN, TOTIRONBC in the last 72 hours.            Hemodynamics:  Temp (24hrs), Av.7 °C (98 °F), Min:36.3 °C (97.4 °F), Max:37.1 °C (98.7 °F)  Temperature: 36.6 °C (97.9 °F)  Pulse  Av.1  Min: 59  Max: 108   Blood Pressure: 133/77 mmHg     Respiratory:    Respiration: 18, Pulse Oximetry: 95 %     Work Of Breathing / Effort: Mild  RUL Breath Sounds: Diminished, RML Breath Sounds: Diminished, RLL Breath Sounds: Diminished, MARY Breath Sounds: Diminished, LLL Breath Sounds: Diminished  Fluids:    Intake/Output Summary (Last 24 hours) at 17 1529  Last data filed at 17 0600   Gross per 24 hour   Intake   1900 ml   Output   1400 ml   Net    500 ml        GI/Nutrition:  Orders Placed This Encounter   Procedures   • Diet Order     Standing Status: Standing      Number of Occurrences: 1      Standing Expiration Date:      Order Specific Question:  Diet:     Answer:  Diabetic [3]     Order Specific Question:  Texture/Fiber modifications:     Answer:  Dysphagia 2(Pureed/Chopped)specify fluid consistency(question 6) [2]     Order Specific Question:  Consistency/Fluid modifications:     Answer:  Thin Liquids [3]     Medical Decision Making, by Problem:  Active Hospital Problems    Diagnosis   • *Quadriparesis (CMS-[facility]) [G82.50]     Prednisone, Neurology following, muscle biopsy done   • HTN (hypertension) [I10]      No current meds   • Chronic pain syndrome [G89.4]       Fentanyl patch, Oxycodone, Neurontin   • Dysphagia [R13.10]        Dysphagia 2   • Chronic inflammatory arthritis [M19.90]       previously on Plaquenil   • Suprapubic catheter (CMS-HCC) [Z93.59]          • Schizophrenia (CMS-HCC) [F20.9]      Geodon, Prozac   • TONYA on  CPAP [G47.33]          may use Home CPAP   • Hypothyroidism [E03.9]         Synthroid       Medications reviewed, Labs reviewed, Radiology images reviewed and EKG reviewed  Andrade catheter: Neurogenic Bladder      DVT Prophylaxis: Enoxaparin (Lovenox)    Ulcer prophylaxis: Yes    Assessed for rehab: Patient was assess for and/or received rehabilitation services during this hospitalization

## 2017-01-28 LAB
GLUCOSE BLD-MCNC: 117 MG/DL (ref 65–99)
GLUCOSE BLD-MCNC: 118 MG/DL (ref 65–99)
GLUCOSE BLD-MCNC: 120 MG/DL (ref 65–99)
GLUCOSE BLD-MCNC: 68 MG/DL (ref 65–99)

## 2017-01-28 PROCEDURE — 770006 HCHG ROOM/CARE - MED/SURG/GYN SEMI*

## 2017-01-28 PROCEDURE — A9270 NON-COVERED ITEM OR SERVICE: HCPCS | Performed by: HOSPITALIST

## 2017-01-28 PROCEDURE — A9270 NON-COVERED ITEM OR SERVICE: HCPCS | Performed by: FAMILY MEDICINE

## 2017-01-28 PROCEDURE — 700111 HCHG RX REV CODE 636 W/ 250 OVERRIDE (IP): Performed by: FAMILY MEDICINE

## 2017-01-28 PROCEDURE — 82962 GLUCOSE BLOOD TEST: CPT

## 2017-01-28 PROCEDURE — 700111 HCHG RX REV CODE 636 W/ 250 OVERRIDE (IP): Performed by: HOSPITALIST

## 2017-01-28 PROCEDURE — 97530 THERAPEUTIC ACTIVITIES: CPT

## 2017-01-28 PROCEDURE — 700102 HCHG RX REV CODE 250 W/ 637 OVERRIDE(OP): Performed by: HOSPITALIST

## 2017-01-28 PROCEDURE — 97535 SELF CARE MNGMENT TRAINING: CPT

## 2017-01-28 PROCEDURE — 97112 NEUROMUSCULAR REEDUCATION: CPT

## 2017-01-28 PROCEDURE — 700112 HCHG RX REV CODE 229: Performed by: HOSPITALIST

## 2017-01-28 PROCEDURE — 700102 HCHG RX REV CODE 250 W/ 637 OVERRIDE(OP): Performed by: FAMILY MEDICINE

## 2017-01-28 PROCEDURE — 99232 SBSQ HOSP IP/OBS MODERATE 35: CPT | Performed by: FAMILY MEDICINE

## 2017-01-28 RX ADMIN — LACTULOSE 15 ML: 10 SOLUTION ORAL at 15:00

## 2017-01-28 RX ADMIN — FLUOXETINE 10 MG: 10 CAPSULE ORAL at 07:49

## 2017-01-28 RX ADMIN — OMEPRAZOLE 20 MG: 20 CAPSULE, DELAYED RELEASE ORAL at 19:52

## 2017-01-28 RX ADMIN — Medication 650 MG: at 15:36

## 2017-01-28 RX ADMIN — BISACODYL 10 MG: 10 SUPPOSITORY RECTAL at 12:44

## 2017-01-28 RX ADMIN — LEVOTHYROXINE SODIUM 75 MCG: 75 TABLET ORAL at 05:40

## 2017-01-28 RX ADMIN — NAPROXEN 500 MG: 500 TABLET ORAL at 07:49

## 2017-01-28 RX ADMIN — ENOXAPARIN SODIUM 40 MG: 100 INJECTION SUBCUTANEOUS at 07:49

## 2017-01-28 RX ADMIN — CYANOCOBALAMIN TAB 500 MCG 1000 MCG: 500 TAB at 19:51

## 2017-01-28 RX ADMIN — MAGNESIUM HYDROXIDE 30 ML: 400 SUSPENSION ORAL at 07:58

## 2017-01-28 RX ADMIN — POLYETHYLENE GLYCOL 3350 1 PACKET: 17 POWDER, FOR SOLUTION ORAL at 07:49

## 2017-01-28 RX ADMIN — CYCLOBENZAPRINE HYDROCHLORIDE 10 MG: 10 TABLET, FILM COATED ORAL at 07:49

## 2017-01-28 RX ADMIN — GABAPENTIN 600 MG: 400 CAPSULE ORAL at 07:50

## 2017-01-28 RX ADMIN — PREDNISONE 50 MG: 50 TABLET ORAL at 07:48

## 2017-01-28 RX ADMIN — GABAPENTIN 1200 MG: 400 CAPSULE ORAL at 19:50

## 2017-01-28 RX ADMIN — OMEPRAZOLE 20 MG: 20 CAPSULE, DELAYED RELEASE ORAL at 07:49

## 2017-01-28 RX ADMIN — DOCUSATE SODIUM 100 MG: 100 CAPSULE ORAL at 07:49

## 2017-01-28 RX ADMIN — OXYCODONE HYDROCHLORIDE 20 MG: 10 TABLET ORAL at 05:40

## 2017-01-28 RX ADMIN — LACTULOSE 15 ML: 10 SOLUTION ORAL at 07:50

## 2017-01-28 RX ADMIN — OXYCODONE HYDROCHLORIDE 20 MG: 10 TABLET ORAL at 14:25

## 2017-01-28 RX ADMIN — Medication 1 TABLET: at 19:51

## 2017-01-28 RX ADMIN — CYCLOBENZAPRINE HYDROCHLORIDE 10 MG: 10 TABLET, FILM COATED ORAL at 19:52

## 2017-01-28 RX ADMIN — OXYBUTYNIN CHLORIDE 10 MG: 10 TABLET, FILM COATED, EXTENDED RELEASE ORAL at 19:50

## 2017-01-28 RX ADMIN — OXYCODONE HYDROCHLORIDE 20 MG: 10 TABLET ORAL at 19:56

## 2017-01-28 RX ADMIN — ZIPRASIDONE HCL 160 MG: 40 CAPSULE ORAL at 19:50

## 2017-01-28 RX ADMIN — CYANOCOBALAMIN TAB 500 MCG 1000 MCG: 500 TAB at 07:49

## 2017-01-28 RX ADMIN — Medication 650 MG: at 19:50

## 2017-01-28 RX ADMIN — MAGNESIUM CITRATE 296 ML: 1.75 LIQUID ORAL at 09:51

## 2017-01-28 RX ADMIN — OXYBUTYNIN CHLORIDE 10 MG: 10 TABLET, FILM COATED, EXTENDED RELEASE ORAL at 07:48

## 2017-01-28 RX ADMIN — OXYCODONE HYDROCHLORIDE 20 MG: 10 TABLET ORAL at 09:51

## 2017-01-28 RX ADMIN — NAPROXEN 500 MG: 500 TABLET ORAL at 19:52

## 2017-01-28 RX ADMIN — Medication 650 MG: at 07:48

## 2017-01-28 ASSESSMENT — ENCOUNTER SYMPTOMS
FOCAL WEAKNESS: 1
SPEECH CHANGE: 0
BLURRED VISION: 0
SEIZURES: 0
HEADACHES: 0
DIZZINESS: 0
WEAKNESS: 1
FEVER: 0
CHILLS: 0
SORE THROAT: 0
SENSORY CHANGE: 1
HEARTBURN: 0
SHORTNESS OF BREATH: 0
ABDOMINAL PAIN: 0
VOMITING: 0
NAUSEA: 0
DIARRHEA: 0
COUGH: 0
WHEEZING: 0

## 2017-01-28 ASSESSMENT — PAIN SCALES - GENERAL
PAINLEVEL_OUTOF10: 9
PAINLEVEL_OUTOF10: 2

## 2017-01-28 NOTE — CARE PLAN
Problem: Infection  Goal: Will remain free from infection  Outcome: PROGRESSING AS EXPECTED  Intervention: Assess signs and symptoms of infection  Assessed for signs and symptoms of infection.   Intervention: Implement standard precautions and perform hand washing before and after patient contact  Standard precautions and hand washing performed before and after patient contact per protocol.    Intervention: Assess for removal of potential routes of infection, such as IV, central line, intra-arterial or urinary catheters  Removal of potential routes of infection assessed.       Problem: Bowel/Gastric:  Goal: Normal bowel function is maintained or improved  Outcome: PROGRESSING SLOWER THAN EXPECTED  Intervention: Educate patient and significant other/support system about diet, fluid intake, medications and activity to promote bowel function  Pt educated about diet, fluid intake, medications and activity to promote bowel function.   Intervention: Educate patient and significant other/support system about signs and symptoms of constipation and interventions to implement  Pt educated about signs and symptoms of constipation and interventions to implement. Pt unreceptive to the idea of cutting back on narcotics.

## 2017-01-28 NOTE — PROGRESS NOTES
IMPRESSION  1. Spells of weakness affecting both legs, myelitis vs psychogenic  2. Not sure I would call this as typical mitochondrial disease-- anyway, it is reasonable to proceed with muscle biopsy    Management    Fine with steroid   Muscle biopsy was done ( requested by her neurologist in [facility]-- that is what the patient told me today)    Continue PT OT    ________________________________________________________________________    If circumstances change do not hesitate to contact DR Fox or Me    At this point, patient remains stable.    Agree with nursing home placement-- that is the best way to remain economical in medical care in this patient's care, her grandmother could not provide nursing care at home-- this has been ongoing problem for more than one year    Thank you for the consult.     Jennifer Reno M.D.  [facility]  7:12 PM01/27/2017    ________________________________________________________________________      Filed Vitals:    01/26/17 2000 01/27/17 0400 01/27/17 0800 01/27/17 1600   BP: 137/72 136/76 133/77 117/65   Pulse: 71 70 71 68   Temp: 36.6 °C (97.9 °F) 36.3 °C (97.4 °F) 36.6 °C (97.9 °F) 36.8 °C (98.2 °F)   Resp: 18 18 18 18   Height:       Weight:       SpO2: 96% 94% 95% 92%     Physical Exam   Constitutional: She is oriented to person, place, and time and well-developed, well-nourished, and in no distress.   HENT:   Head: Normocephalic.   Eyes: Pupils are equal, round, and reactive to light.   Pulmonary/Chest: Effort normal.   Abdominal: Soft.   Musculoskeletal: Normal range of motion.   Neurological: She is alert and oriented to person, place, and time.   Muscle strength tests-- not consistent  Reflexes reserved   Skin: Skin is warm.       Review of Systems   Constitutional: Negative for fever.   Respiratory: Positive for cough.    Musculoskeletal: Positive for myalgias.   Neurological: Positive for speech change, focal weakness and headaches.    Psychiatric/Behavioral: Positive for depression and suicidal ideas.           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 Fox and has also been at [place] with plans to undergo muscle biopsy    later this week.  She reports that she has chronic right-sided numbness;    however, over the past couple of days, numbness has become more severe.  She    has also noted weakness in her right upper and right lower extremity and left    lower extremity since last night.  She is also complaining of headache that    started this morning.  She denies any dyspnea or chest pain.  She complains of   nausea.  No vomiting.  No diarrhea.  No melena or rectal bleeding.  She has a   chronic suprapubic catheter with good output.  She has not noted any    hematuria.  She presented to the emergency room for further evaluation.     REVIEW OF SYSTEMS:  As above, otherwise reviewed and negative.     PAST MEDICAL HISTORY:  Significant for:  1.  History of PCOS.  2.  Fibromyalgia.  3.  Chronic pain syndrome.  4.  History of suspected gastroparesis.  5.  History of dysphagia.  6.  Depression, anxiety and history of psychosis.  ________________________________________________________________________      7.  Urinary retention status post suprapubic catheter placements.  8.  History of latent TB.  9.  History of supraventricular tachycardia.
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
## 2017-01-28 NOTE — PROGRESS NOTES
Pt refused bed alarm, Pt educated regarding the benefits of having a bed alarm, risks of refusing bed alarm. Continues to refuse. Pt calls appropriately. Hourly rounding practiced.  Assumed care of pt from day RN. Pt provided with drinks, medication and repositioning at this time. Pt reports bleeding at IV site and muscle biopsy site. Both sites assessed. Scant, dried drainage at muscle biopsy site, left open to air. IV patent, quick blood return. Dressing changed on IV.   Pt resting in room with eyes closed, denies further needs. Will continue to monitor, assist, turn and medicate per MAR for duration of shift.

## 2017-01-28 NOTE — PROGRESS NOTES
Pt wears CPAP for a short time at night. Is compliant with putting on mask, however every time staff enters room mask has been removed. Pt O2 saturation is WDL.  Pt BS is 68 at 0600, AM medication given with 250 ml of apple juice. Day RN notified.

## 2017-01-28 NOTE — PROGRESS NOTES
Hospital Medicine Progress Note, Adult, Complex               Author: Lalo Vernon Date & Time created: 1/28/2017  12:28 PM     Interval History:  Admitted for Quadriparesis.    Quadriparesis - able to move legs a little bit more, muscle biopsy done  Pain - better controlled  HTN - BP controlled  Dysphagia - swallow eval done    Review of Systems:  Review of Systems   Constitutional: Positive for malaise/fatigue. Negative for fever and chills.   HENT: Negative for sore throat.    Eyes: Negative for blurred vision.   Respiratory: Negative for cough, shortness of breath and wheezing.    Cardiovascular: Negative for chest pain.   Gastrointestinal: Negative for heartburn, nausea, vomiting, abdominal pain and diarrhea.   Musculoskeletal: Positive for joint pain.   Neurological: Positive for sensory change, focal weakness and weakness. Negative for dizziness, speech change, seizures and headaches.       Physical Exam:  Physical Exam   Constitutional: She is oriented to person, place, and time. She appears well-developed.   Morbidly obese   HENT:   Head: Normocephalic and atraumatic.   Eyes: Conjunctivae are normal. Pupils are equal, round, and reactive to light.   Neck: No tracheal deviation present. No thyromegaly present.   Cardiovascular: Normal rate and regular rhythm.    Pulmonary/Chest: Effort normal and breath sounds normal.   Abdominal: Soft. Bowel sounds are normal. She exhibits no distension. There is no tenderness.   Musculoskeletal: She exhibits edema.   Incision at midline R thigh   Lymphadenopathy:     She has no cervical adenopathy.   Neurological: She is alert and oriented to person, place, and time. No cranial nerve deficit.   MMT RUE 3-4/5, LUE 4+/5, BLE 1-2/5   Skin: Skin is warm and dry.   Nursing note and vitals reviewed.      Labs:        Invalid input(s): [id number]      Recent Labs      01/27/17   0230   SODIUM  136   POTASSIUM  4.2   CHLORIDE  103   CO2  25   BUN  21   CREATININE  0.63    CALCIUM  8.4*     Recent Labs      17   0230   GLUCOSE  148*     No results for input(s): RBC, HEMOGLOBIN, HEMATOCRIT, PLATELETCT, PROTHROMBTM, APTT, INR, IRON, FERRITIN, TOTIRONBC in the last 72 hours.            Hemodynamics:  Temp (24hrs), Av.5 °C (97.7 °F), Min:36.2 °C (97.1 °F), Max:36.8 °C (98.2 °F)  Temperature: 36.4 °C (97.6 °F)  Pulse  Av.8  Min: 59  Max: 108   Blood Pressure: 120/74 mmHg     Respiratory:    Respiration: 16, Pulse Oximetry: 96 %     Work Of Breathing / Effort: Mild  RUL Breath Sounds: Diminished, RML Breath Sounds: Diminished, RLL Breath Sounds: Diminished, MARY Breath Sounds: Diminished, LLL Breath Sounds: Diminished  Fluids:    Intake/Output Summary (Last 24 hours) at 17 1228  Last data filed at 17 0700   Gross per 24 hour   Intake   1100 ml   Output   2900 ml   Net  -1800 ml        GI/Nutrition:  Orders Placed This Encounter   Procedures   • Diet Order     Standing Status: Standing      Number of Occurrences: 1      Standing Expiration Date:      Order Specific Question:  Diet:     Answer:  Diabetic [3]     Order Specific Question:  Texture/Fiber modifications:     Answer:  Dysphagia 2(Pureed/Chopped)specify fluid consistency(question 6) [2]     Order Specific Question:  Consistency/Fluid modifications:     Answer:  Thin Liquids [3]     Medical Decision Making, by Problem:  Active Hospital Problems    Diagnosis   • *Quadriparesis (CMS-HCC) [G82.50]     Prednisone, muscle biopsy done   • HTN (hypertension) [I10]      No current meds   • Chronic pain syndrome [G89.4]       Fentanyl patch, Oxycodone, Neurontin   • Dysphagia [R13.10]        Dysphagia 2   • Chronic inflammatory arthritis [M19.90]       previously on Plaquenil   • Suprapubic catheter (CMS-HCC) [Z93.59]          • Schizophrenia (CMS-HCC) [F20.9]      Geodon, Prozac   • TONYA on CPAP [G47.33]          may use Home CPAP   • Hypothyroidism [E03.9]         Synthroid       Medications reviewed, Labs  reviewed, Radiology images reviewed and EKG reviewed  Andrade catheter: Neurogenic Bladder      DVT Prophylaxis: Enoxaparin (Lovenox)    Ulcer prophylaxis: Yes    Assessed for rehab: Patient was assess for and/or received rehabilitation services during this hospitalization

## 2017-01-29 LAB
DEPRECATED D DIMER PPP IA-ACNC: <200 NG/ML(D-DU)
GAD65 AB SER IA-ACNC: <5 IU/ML (ref 0–5)
GLUCOSE BLD-MCNC: 131 MG/DL (ref 65–99)
GLUCOSE BLD-MCNC: 157 MG/DL (ref 65–99)
GLUCOSE BLD-MCNC: 71 MG/DL (ref 65–99)
GLUCOSE BLD-MCNC: 89 MG/DL (ref 65–99)
TROPONIN I SERPL-MCNC: <0.01 NG/ML (ref 0–0.04)

## 2017-01-29 PROCEDURE — 700102 HCHG RX REV CODE 250 W/ 637 OVERRIDE(OP): Performed by: FAMILY MEDICINE

## 2017-01-29 PROCEDURE — 93005 ELECTROCARDIOGRAM TRACING: CPT | Performed by: FAMILY MEDICINE

## 2017-01-29 PROCEDURE — A9270 NON-COVERED ITEM OR SERVICE: HCPCS | Performed by: HOSPITALIST

## 2017-01-29 PROCEDURE — 770020 HCHG ROOM/CARE - TELE (206)

## 2017-01-29 PROCEDURE — 700102 HCHG RX REV CODE 250 W/ 637 OVERRIDE(OP): Performed by: HOSPITALIST

## 2017-01-29 PROCEDURE — 82962 GLUCOSE BLOOD TEST: CPT | Mod: 91

## 2017-01-29 PROCEDURE — 36415 COLL VENOUS BLD VENIPUNCTURE: CPT

## 2017-01-29 PROCEDURE — A9270 NON-COVERED ITEM OR SERVICE: HCPCS | Performed by: FAMILY MEDICINE

## 2017-01-29 PROCEDURE — 93010 ELECTROCARDIOGRAM REPORT: CPT | Performed by: INTERNAL MEDICINE

## 2017-01-29 PROCEDURE — 700111 HCHG RX REV CODE 636 W/ 250 OVERRIDE (IP): Performed by: FAMILY MEDICINE

## 2017-01-29 PROCEDURE — 700102 HCHG RX REV CODE 250 W/ 637 OVERRIDE(OP): Performed by: NURSE PRACTITIONER

## 2017-01-29 PROCEDURE — 84484 ASSAY OF TROPONIN QUANT: CPT

## 2017-01-29 PROCEDURE — 700111 HCHG RX REV CODE 636 W/ 250 OVERRIDE (IP): Performed by: HOSPITALIST

## 2017-01-29 PROCEDURE — 99232 SBSQ HOSP IP/OBS MODERATE 35: CPT | Performed by: FAMILY MEDICINE

## 2017-01-29 PROCEDURE — 85379 FIBRIN DEGRADATION QUANT: CPT

## 2017-01-29 PROCEDURE — A9270 NON-COVERED ITEM OR SERVICE: HCPCS | Performed by: NURSE PRACTITIONER

## 2017-01-29 RX ADMIN — LEVOTHYROXINE SODIUM 75 MCG: 75 TABLET ORAL at 06:47

## 2017-01-29 RX ADMIN — Medication 1 TABLET: at 20:54

## 2017-01-29 RX ADMIN — GABAPENTIN 600 MG: 400 CAPSULE ORAL at 09:49

## 2017-01-29 RX ADMIN — PREDNISONE 50 MG: 50 TABLET ORAL at 09:56

## 2017-01-29 RX ADMIN — INSULIN LISPRO 2 UNITS: 100 INJECTION, SOLUTION INTRAVENOUS; SUBCUTANEOUS at 17:31

## 2017-01-29 RX ADMIN — CYCLOBENZAPRINE HYDROCHLORIDE 10 MG: 10 TABLET, FILM COATED ORAL at 20:58

## 2017-01-29 RX ADMIN — Medication 650 MG: at 21:00

## 2017-01-29 RX ADMIN — Medication 650 MG: at 17:27

## 2017-01-29 RX ADMIN — GABAPENTIN 1200 MG: 400 CAPSULE ORAL at 20:57

## 2017-01-29 RX ADMIN — CYANOCOBALAMIN TAB 500 MCG 1000 MCG: 500 TAB at 20:55

## 2017-01-29 RX ADMIN — OMEPRAZOLE 20 MG: 20 CAPSULE, DELAYED RELEASE ORAL at 21:00

## 2017-01-29 RX ADMIN — NAPROXEN 500 MG: 500 TABLET ORAL at 09:50

## 2017-01-29 RX ADMIN — NAPROXEN 500 MG: 500 TABLET ORAL at 20:59

## 2017-01-29 RX ADMIN — OXYBUTYNIN CHLORIDE 10 MG: 10 TABLET, FILM COATED, EXTENDED RELEASE ORAL at 21:00

## 2017-01-29 RX ADMIN — OXYCODONE HYDROCHLORIDE 20 MG: 10 TABLET ORAL at 04:01

## 2017-01-29 RX ADMIN — OXYBUTYNIN CHLORIDE 10 MG: 10 TABLET, FILM COATED, EXTENDED RELEASE ORAL at 09:50

## 2017-01-29 RX ADMIN — OMEPRAZOLE 20 MG: 20 CAPSULE, DELAYED RELEASE ORAL at 09:50

## 2017-01-29 RX ADMIN — METHYLNALTREXONE BROMIDE 12 MG: 12 INJECTION, SOLUTION SUBCUTANEOUS at 10:32

## 2017-01-29 RX ADMIN — FLUOXETINE 10 MG: 10 CAPSULE ORAL at 09:48

## 2017-01-29 RX ADMIN — LACTULOSE 15 ML: 10 SOLUTION ORAL at 15:00

## 2017-01-29 RX ADMIN — ENOXAPARIN SODIUM 40 MG: 100 INJECTION SUBCUTANEOUS at 09:48

## 2017-01-29 RX ADMIN — FENTANYL 1 PATCH: 25 PATCH TRANSDERMAL at 17:27

## 2017-01-29 RX ADMIN — OXYCODONE HYDROCHLORIDE 10 MG: 10 TABLET ORAL at 13:36

## 2017-01-29 RX ADMIN — LACTULOSE 15 ML: 10 SOLUTION ORAL at 09:49

## 2017-01-29 RX ADMIN — OXYCODONE HYDROCHLORIDE 20 MG: 10 TABLET ORAL at 20:54

## 2017-01-29 RX ADMIN — ZIPRASIDONE HCL 160 MG: 40 CAPSULE ORAL at 20:59

## 2017-01-29 RX ADMIN — CYCLOBENZAPRINE HYDROCHLORIDE 10 MG: 10 TABLET, FILM COATED ORAL at 09:48

## 2017-01-29 RX ADMIN — Medication 650 MG: at 09:55

## 2017-01-29 RX ADMIN — OXYCODONE HYDROCHLORIDE 10 MG: 10 TABLET ORAL at 09:56

## 2017-01-29 RX ADMIN — CYANOCOBALAMIN TAB 500 MCG 1000 MCG: 500 TAB at 09:48

## 2017-01-29 ASSESSMENT — ENCOUNTER SYMPTOMS
DIARRHEA: 0
SEIZURES: 0
CHILLS: 0
SPEECH CHANGE: 0
SENSORY CHANGE: 1
FOCAL WEAKNESS: 1
WHEEZING: 0
SORE THROAT: 0
HEADACHES: 0
DIZZINESS: 0
FEVER: 0
NAUSEA: 0
ABDOMINAL PAIN: 0
COUGH: 0
HEARTBURN: 0
VOMITING: 0
SHORTNESS OF BREATH: 0
WEAKNESS: 1
BLURRED VISION: 0
CONSTIPATION: 1

## 2017-01-29 ASSESSMENT — PAIN SCALES - GENERAL
PAINLEVEL_OUTOF10: 5
PAINLEVEL_OUTOF10: 5
PAINLEVEL_OUTOF10: 7
PAINLEVEL_OUTOF10: 2

## 2017-01-29 NOTE — PROGRESS NOTES
PIV infiltrated, pt c/o pain at the site. Stated she did not want another IV. Hospitalist RN made aware. Per Hospitalist, ok for no IV access.

## 2017-01-29 NOTE — PROGRESS NOTES
Pt refused bed alarm. Education provided, continues to refuse. Pt refuses non-slip socks while in bed, uses them when transferring from bed to commode. Uses call light appropriately. Hourly rounding practiced.

## 2017-01-29 NOTE — PROGRESS NOTES
Pt assessment complete. Pt assisted to BSC w/ 2 assist. Repositioned for comfort. No BM noted. MD (hospitalist) aware. Orders received. Pt educated on new medication. NAD. Remains weak in BLE.

## 2017-01-29 NOTE — CARE PLAN
Problem: Bowel/Gastric:  Goal: Will not experience complications related to bowel motility  Outcome: PROGRESSING SLOWER THAN EXPECTED  Patient have had several bowel protocol, meds, with no result    Problem: Pain Management  Goal: Pain level will decrease to patient’s comfort goal  Outcome: PROGRESSING AS EXPECTED  Intervention: Follow pain managment plan developed in collaboration with patient and Interdisciplinary Team  Medicated patient for pain, per MAR, with effective result.

## 2017-01-29 NOTE — PROGRESS NOTES
Pt and family member concerned no BM as of yet. Mag citrate given per MAR. PRN suppository given per MAR. Prune juice given. Updated hospitalist RNBijal. She stated hospitalist will follow up tomorrow morning. Updated patient. Will continue scheduled BM protocol medications.

## 2017-01-29 NOTE — PROGRESS NOTES
"Pt has c/o CP; describes it as \"sharp/stabbing\" located left mid chest wall. Associated chest pressure. CP of 7-8/10. Stat EKG received at 1458. MD Vernon notified. Labs drawn. RRT team at bedside (1500).  "

## 2017-01-29 NOTE — PROGRESS NOTES
"Break RN:    Informed by CNA that pt states pain to chest.  This RN in to assess pt who states that the pain feels like it's \"stabbing and something is sitting on my chest.\"  STAT EKG ordered per protocol.    VS - /82, HR 92, RR 19, Temp 98.3, 95% RA  "

## 2017-01-29 NOTE — PROGRESS NOTES
Assumed care of patient, after report. Assessment completed. Patient O&Ax4. VSS. NEWTON. No signs of distress noted. Continued with bowel protocol meds, with no result.No C/O N/V. No IV access at this time. Physician aware. Will continue, to monitor status.

## 2017-01-29 NOTE — PROGRESS NOTES
Hospital Medicine Progress Note, Adult, Complex               Author: Lalo Vernon Date & Time created: 1/29/2017  12:22 PM     Interval History:  Admitted for Quadriparesis.    Quadriparesis - able to move legs a little bit more, muscle biopsy done  Pain - better controlled  HTN - BP controlled  Dysphagia - swallow eval done  Constipation - still no BM    Review of Systems:  Review of Systems   Constitutional: Positive for malaise/fatigue. Negative for fever and chills.   HENT: Negative for sore throat.    Eyes: Negative for blurred vision.   Respiratory: Negative for cough, shortness of breath and wheezing.    Cardiovascular: Negative for chest pain.   Gastrointestinal: Positive for constipation. Negative for heartburn, nausea, vomiting, abdominal pain and diarrhea.   Musculoskeletal: Positive for joint pain.   Neurological: Positive for sensory change, focal weakness and weakness. Negative for dizziness, speech change, seizures and headaches.       Physical Exam:  Physical Exam   Constitutional: She is oriented to person, place, and time. She appears well-developed.   Morbidly obese   HENT:   Head: Normocephalic and atraumatic.   Eyes: Conjunctivae are normal. Pupils are equal, round, and reactive to light.   Neck: No tracheal deviation present. No thyromegaly present.   Cardiovascular: Normal rate and regular rhythm.    Pulmonary/Chest: Effort normal and breath sounds normal.   Abdominal: Soft. Bowel sounds are normal. She exhibits no distension. There is no tenderness.   Musculoskeletal: She exhibits edema.   Incision at midline R thigh   Lymphadenopathy:     She has no cervical adenopathy.   Neurological: She is alert and oriented to person, place, and time. No cranial nerve deficit.   MMT RUE 3-4/5, LUE 4+/5, BLE 2/5   Skin: Skin is warm and dry.   Nursing note and vitals reviewed.      Labs:        Invalid input(s): [id number]      Recent Labs      01/27/17   0230   SODIUM  136   POTASSIUM  4.2    CHLORIDE  103   CO2  25   BUN  21   CREATININE  0.63   CALCIUM  8.4*     Recent Labs      17   0230   GLUCOSE  148*     No results for input(s): RBC, HEMOGLOBIN, HEMATOCRIT, PLATELETCT, PROTHROMBTM, APTT, INR, IRON, FERRITIN, TOTIRONBC in the last 72 hours.            Hemodynamics:  Temp (24hrs), Av.5 °C (97.7 °F), Min:36 °C (96.8 °F), Max:36.9 °C (98.5 °F)  Temperature: 36.9 °C (98.5 °F)  Pulse  Av.5  Min: 59  Max: 113   Blood Pressure: 120/57 mmHg     Respiratory:    Respiration: 18, Pulse Oximetry: 95 %     Work Of Breathing / Effort: Mild  RUL Breath Sounds: Diminished;Clear, RML Breath Sounds: Diminished;Clear, RLL Breath Sounds: Diminished;Clear, MARY Breath Sounds: Diminished;Clear, LLL Breath Sounds: Diminished;Clear  Fluids:    Intake/Output Summary (Last 24 hours) at 17 1222  Last data filed at 17 0600   Gross per 24 hour   Intake      0 ml   Output   3200 ml   Net  -3200 ml        GI/Nutrition:  Orders Placed This Encounter   Procedures   • Diet Order     Standing Status: Standing      Number of Occurrences: 1      Standing Expiration Date:      Order Specific Question:  Diet:     Answer:  Diabetic [3]     Order Specific Question:  Texture/Fiber modifications:     Answer:  Dysphagia 2(Pureed/Chopped)specify fluid consistency(question 6) [2]     Order Specific Question:  Consistency/Fluid modifications:     Answer:  Thin Liquids [3]     Medical Decision Making, by Problem:  Active Hospital Problems    Diagnosis   • *Quadriparesis (CMS-HCC) [G82.50]     Prednisone, muscle biopsy done   • HTN (hypertension) [I10]      No current meds   • Chronic pain syndrome [G89.4]       Fentanyl patch, Oxycodone, Neurontin   • Dysphagia [R13.10]        Dysphagia 2   • Chronic inflammatory arthritis [M19.90]       previously on Plaquenil   • Suprapubic catheter (CMS-HCC) [Z93.59]          • Schizophrenia (CMS-HCC) [F20.9]      Geodon, Prozac   • TONYA on CPAP [G47.33]          may use Home CPAP    • Hypothyroidism [E03.9]         Synthroid        Constipation.   Bowel protocol, Relistor today    Medications reviewed, Labs reviewed, Radiology images reviewed and EKG reviewed  Andrade catheter: Neurogenic Bladder      DVT Prophylaxis: Enoxaparin (Lovenox)    Ulcer prophylaxis: Yes    Assessed for rehab: Patient was assess for and/or received rehabilitation services during this hospitalization

## 2017-01-30 ENCOUNTER — APPOINTMENT (OUTPATIENT)
Dept: RADIOLOGY | Facility: MEDICAL CENTER | Age: 27
DRG: 876 | End: 2017-01-30
Attending: FAMILY MEDICINE
Payer: MEDICARE

## 2017-01-30 ENCOUNTER — TELEPHONE (OUTPATIENT)
Dept: BEHAVIORAL HEALTH | Facility: MEDICAL CENTER | Age: 27
End: 2017-01-30

## 2017-01-30 LAB
ALBUMIN SERPL BCP-MCNC: 4.1 G/DL (ref 3.2–4.9)
ALBUMIN/GLOB SERPL: 1.7 G/DL
ALP SERPL-CCNC: 65 U/L (ref 30–99)
ALT SERPL-CCNC: 41 U/L (ref 2–50)
ANION GAP SERPL CALC-SCNC: 10 MMOL/L (ref 0–11.9)
AST SERPL-CCNC: 16 U/L (ref 12–45)
BASOPHILS # BLD AUTO: 0.3 % (ref 0–1.8)
BASOPHILS # BLD: 0.04 K/UL (ref 0–0.12)
BILIRUB SERPL-MCNC: 0.4 MG/DL (ref 0.1–1.5)
BUN SERPL-MCNC: 21 MG/DL (ref 8–22)
CALCIUM SERPL-MCNC: 9.2 MG/DL (ref 8.5–10.5)
CHLORIDE SERPL-SCNC: 100 MMOL/L (ref 96–112)
CO2 SERPL-SCNC: 24 MMOL/L (ref 20–33)
CREAT SERPL-MCNC: 0.79 MG/DL (ref 0.5–1.4)
EKG IMPRESSION: NORMAL
EOSINOPHIL # BLD AUTO: 0.27 K/UL (ref 0–0.51)
EOSINOPHIL NFR BLD: 2 % (ref 0–6.9)
ERYTHROCYTE [DISTWIDTH] IN BLOOD BY AUTOMATED COUNT: 39.5 FL (ref 35.9–50)
GFR SERPL CREATININE-BSD FRML MDRD: >60 ML/MIN/1.73 M 2
GLOBULIN SER CALC-MCNC: 2.4 G/DL (ref 1.9–3.5)
GLUCOSE BLD-MCNC: 106 MG/DL (ref 65–99)
GLUCOSE BLD-MCNC: 133 MG/DL (ref 65–99)
GLUCOSE BLD-MCNC: 162 MG/DL (ref 65–99)
GLUCOSE BLD-MCNC: 72 MG/DL (ref 65–99)
GLUCOSE SERPL-MCNC: 101 MG/DL (ref 65–99)
HCT VFR BLD AUTO: 45.2 % (ref 37–47)
HGB BLD-MCNC: 15.4 G/DL (ref 12–16)
IMM GRANULOCYTES # BLD AUTO: 0.21 K/UL (ref 0–0.11)
IMM GRANULOCYTES NFR BLD AUTO: 1.6 % (ref 0–0.9)
LYMPHOCYTES # BLD AUTO: 4.44 K/UL (ref 1–4.8)
LYMPHOCYTES NFR BLD: 33.4 % (ref 22–41)
MCH RBC QN AUTO: 29.6 PG (ref 27–33)
MCHC RBC AUTO-ENTMCNC: 34.1 G/DL (ref 33.6–35)
MCV RBC AUTO: 86.8 FL (ref 81.4–97.8)
MONOCYTES # BLD AUTO: 0.67 K/UL (ref 0–0.85)
MONOCYTES NFR BLD AUTO: 5 % (ref 0–13.4)
NEUTROPHILS # BLD AUTO: 7.68 K/UL (ref 2–7.15)
NEUTROPHILS NFR BLD: 57.7 % (ref 44–72)
NRBC # BLD AUTO: 0 K/UL
NRBC BLD AUTO-RTO: 0 /100 WBC
PLATELET # BLD AUTO: 229 K/UL (ref 164–446)
PMV BLD AUTO: 11.4 FL (ref 9–12.9)
POTASSIUM SERPL-SCNC: 4.2 MMOL/L (ref 3.6–5.5)
PROT SERPL-MCNC: 6.5 G/DL (ref 6–8.2)
RBC # BLD AUTO: 5.21 M/UL (ref 4.2–5.4)
SODIUM SERPL-SCNC: 134 MMOL/L (ref 135–145)
TSH SERPL DL<=0.005 MIU/L-ACNC: 3.64 UIU/ML (ref 0.3–3.7)
WBC # BLD AUTO: 13.3 K/UL (ref 4.8–10.8)

## 2017-01-30 PROCEDURE — 85025 COMPLETE CBC W/AUTO DIFF WBC: CPT

## 2017-01-30 PROCEDURE — 80053 COMPREHEN METABOLIC PANEL: CPT

## 2017-01-30 PROCEDURE — A9270 NON-COVERED ITEM OR SERVICE: HCPCS | Performed by: HOSPITALIST

## 2017-01-30 PROCEDURE — 700102 HCHG RX REV CODE 250 W/ 637 OVERRIDE(OP): Performed by: HOSPITALIST

## 2017-01-30 PROCEDURE — 82962 GLUCOSE BLOOD TEST: CPT | Mod: 91

## 2017-01-30 PROCEDURE — 74000 DX-ABDOMEN-1 VIEW: CPT

## 2017-01-30 PROCEDURE — 770006 HCHG ROOM/CARE - MED/SURG/GYN SEMI*

## 2017-01-30 PROCEDURE — 700111 HCHG RX REV CODE 636 W/ 250 OVERRIDE (IP): Performed by: FAMILY MEDICINE

## 2017-01-30 PROCEDURE — 700102 HCHG RX REV CODE 250 W/ 637 OVERRIDE(OP): Performed by: FAMILY MEDICINE

## 2017-01-30 PROCEDURE — 700112 HCHG RX REV CODE 229: Performed by: HOSPITALIST

## 2017-01-30 PROCEDURE — 84443 ASSAY THYROID STIM HORMONE: CPT

## 2017-01-30 PROCEDURE — A9270 NON-COVERED ITEM OR SERVICE: HCPCS | Performed by: FAMILY MEDICINE

## 2017-01-30 PROCEDURE — 99233 SBSQ HOSP IP/OBS HIGH 50: CPT | Performed by: FAMILY MEDICINE

## 2017-01-30 PROCEDURE — 700111 HCHG RX REV CODE 636 W/ 250 OVERRIDE (IP): Performed by: HOSPITALIST

## 2017-01-30 PROCEDURE — 36415 COLL VENOUS BLD VENIPUNCTURE: CPT

## 2017-01-30 RX ORDER — LACTULOSE 20 G/30ML
30 SOLUTION ORAL 2 TIMES DAILY
Status: DISCONTINUED | OUTPATIENT
Start: 2017-01-30 | End: 2017-02-01 | Stop reason: HOSPADM

## 2017-01-30 RX ORDER — LACTULOSE 10 G/15ML
300 SOLUTION ORAL EVERY 4 HOURS PRN
Status: DISCONTINUED | OUTPATIENT
Start: 2017-01-30 | End: 2017-02-01 | Stop reason: HOSPADM

## 2017-01-30 RX ORDER — PREDNISONE 20 MG/1
40 TABLET ORAL DAILY
Status: DISCONTINUED | OUTPATIENT
Start: 2017-01-31 | End: 2017-02-01 | Stop reason: HOSPADM

## 2017-01-30 RX ADMIN — CYANOCOBALAMIN TAB 500 MCG 1000 MCG: 500 TAB at 20:52

## 2017-01-30 RX ADMIN — OXYCODONE HYDROCHLORIDE 20 MG: 10 TABLET ORAL at 06:21

## 2017-01-30 RX ADMIN — NAPROXEN 500 MG: 500 TABLET ORAL at 09:28

## 2017-01-30 RX ADMIN — Medication 650 MG: at 20:52

## 2017-01-30 RX ADMIN — OMEPRAZOLE 20 MG: 20 CAPSULE, DELAYED RELEASE ORAL at 09:28

## 2017-01-30 RX ADMIN — DOCUSATE SODIUM 100 MG: 100 CAPSULE ORAL at 09:28

## 2017-01-30 RX ADMIN — PREDNISONE 50 MG: 50 TABLET ORAL at 09:29

## 2017-01-30 RX ADMIN — Medication 1 TABLET: at 20:52

## 2017-01-30 RX ADMIN — LEVOTHYROXINE SODIUM 75 MCG: 75 TABLET ORAL at 06:21

## 2017-01-30 RX ADMIN — Medication 650 MG: at 09:29

## 2017-01-30 RX ADMIN — Medication 650 MG: at 15:00

## 2017-01-30 RX ADMIN — INSULIN LISPRO 2 UNITS: 100 INJECTION, SOLUTION INTRAVENOUS; SUBCUTANEOUS at 18:18

## 2017-01-30 RX ADMIN — ZIPRASIDONE HCL 160 MG: 40 CAPSULE ORAL at 20:51

## 2017-01-30 RX ADMIN — OXYCODONE HYDROCHLORIDE 20 MG: 10 TABLET ORAL at 22:41

## 2017-01-30 RX ADMIN — GABAPENTIN 1200 MG: 400 CAPSULE ORAL at 20:51

## 2017-01-30 RX ADMIN — ENOXAPARIN SODIUM 40 MG: 100 INJECTION SUBCUTANEOUS at 09:28

## 2017-01-30 RX ADMIN — OXYBUTYNIN CHLORIDE 10 MG: 10 TABLET, FILM COATED, EXTENDED RELEASE ORAL at 20:52

## 2017-01-30 RX ADMIN — LACTULOSE 30 ML: 10 SOLUTION ORAL at 20:52

## 2017-01-30 RX ADMIN — OMEPRAZOLE 20 MG: 20 CAPSULE, DELAYED RELEASE ORAL at 20:51

## 2017-01-30 RX ADMIN — CYCLOBENZAPRINE HYDROCHLORIDE 10 MG: 10 TABLET, FILM COATED ORAL at 20:52

## 2017-01-30 RX ADMIN — FLUOXETINE 10 MG: 10 CAPSULE ORAL at 09:28

## 2017-01-30 RX ADMIN — OXYCODONE HYDROCHLORIDE 20 MG: 10 TABLET ORAL at 12:26

## 2017-01-30 RX ADMIN — CYANOCOBALAMIN TAB 500 MCG 1000 MCG: 500 TAB at 09:28

## 2017-01-30 RX ADMIN — GABAPENTIN 600 MG: 400 CAPSULE ORAL at 09:29

## 2017-01-30 RX ADMIN — CYCLOBENZAPRINE HYDROCHLORIDE 10 MG: 10 TABLET, FILM COATED ORAL at 09:28

## 2017-01-30 RX ADMIN — OXYBUTYNIN CHLORIDE 10 MG: 10 TABLET, FILM COATED, EXTENDED RELEASE ORAL at 09:29

## 2017-01-30 RX ADMIN — OXYCODONE HYDROCHLORIDE 10 MG: 10 TABLET ORAL at 18:20

## 2017-01-30 RX ADMIN — NAPROXEN 500 MG: 500 TABLET ORAL at 20:51

## 2017-01-30 ASSESSMENT — ENCOUNTER SYMPTOMS
DIARRHEA: 0
HEARTBURN: 0
DIZZINESS: 0
BLURRED VISION: 0
VOMITING: 0
ABDOMINAL PAIN: 0
HEADACHES: 0
CONSTIPATION: 1
SORE THROAT: 0
SENSORY CHANGE: 1
WEAKNESS: 1
FOCAL WEAKNESS: 1
FEVER: 0
SPEECH CHANGE: 0
CHILLS: 0
SEIZURES: 0
COUGH: 0
WHEEZING: 0
SHORTNESS OF BREATH: 0
NAUSEA: 0

## 2017-01-30 ASSESSMENT — PAIN SCALES - GENERAL
PAINLEVEL_OUTOF10: 2
PAINLEVEL_OUTOF10: 5
PAINLEVEL_OUTOF10: 2

## 2017-01-30 NOTE — DISCHARGE PLANNING
KRYSTIAN spoke with [facility] who will fax psych eval to [facility].

## 2017-01-30 NOTE — PROGRESS NOTES
Assumed care of patient, after report. A&Ox4. VSS. NEWTON. Tele on, in NSR, per monitor room. Denies any chest pain or distress. Medicated with pain meds per MAR, for C/O generalized pain. Still no BM reported for this shift. Will continue, to monitor.

## 2017-01-30 NOTE — PROGRESS NOTES
After education and verbal consent from pt, she was examined for impaction, after having no results from methylnaltrexone injection. There was no impaction found in rectum on exam.

## 2017-01-30 NOTE — CARE PLAN
Problem: Bowel/Gastric:  Goal: Will not experience complications related to bowel motility  Outcome: PROGRESSING SLOWER THAN EXPECTED    Problem: Pain Management  Goal: Pain level will decrease to patient’s comfort goal  Outcome: PROGRESSING AS EXPECTED    Problem: Respiratory:  Goal: Respiratory status will improve  Outcome: PROGRESSING AS EXPECTED

## 2017-01-30 NOTE — PROGRESS NOTES
Hospital Medicine Progress Note, Adult, Complex               Author: Lalo Vernon Date & Time created: 1/30/2017  3:07 PM     Interval History:  Admitted for Quadriparesis.    Quadriparesis - able to move legs a little bit more, muscle biopsy done  Pain - better controlled  HTN - BP controlled  Dysphagia - swallow eval done  Constipation - still no BM, KUB negative    Review of Systems:  Review of Systems   Constitutional: Positive for malaise/fatigue. Negative for fever and chills.   HENT: Negative for sore throat.    Eyes: Negative for blurred vision.   Respiratory: Negative for cough, shortness of breath and wheezing.    Cardiovascular: Negative for chest pain.   Gastrointestinal: Positive for constipation. Negative for heartburn, nausea, vomiting, abdominal pain and diarrhea.   Musculoskeletal: Positive for joint pain.   Neurological: Positive for sensory change, focal weakness and weakness. Negative for dizziness, speech change, seizures and headaches.       Physical Exam:  Physical Exam   Constitutional: She is oriented to person, place, and time. She appears well-developed.   Morbidly obese   HENT:   Head: Normocephalic and atraumatic.   Eyes: Conjunctivae are normal. Pupils are equal, round, and reactive to light.   Neck: No tracheal deviation present. No thyromegaly present.   Cardiovascular: Normal rate and regular rhythm.    Pulmonary/Chest: Effort normal and breath sounds normal.   Abdominal: Soft. Bowel sounds are normal. She exhibits no distension. There is no tenderness.   Musculoskeletal: She exhibits edema.   Incision at midline R thigh   Lymphadenopathy:     She has no cervical adenopathy.   Neurological: She is alert and oriented to person, place, and time. No cranial nerve deficit.   MMT RUE 3-4/5, LUE 4+/5, BLE 2/5   Skin: Skin is warm and dry.   Nursing note and vitals reviewed.      Labs:        Invalid input(s): [id number]  Recent Labs      01/29/17   1513   TROPONINI  <0.01      Recent Labs      17   0822   SODIUM  134*   POTASSIUM  4.2   CHLORIDE  100   CO2  24   BUN  21   CREATININE  0.79   CALCIUM  9.2     Recent Labs      17   0822   ALTSGPT  41   ASTSGOT  16   ALKPHOSPHAT  65   TBILIRUBIN  0.4   GLUCOSE  101*     Recent Labs      1722   RBC  5.21   HEMOGLOBIN  15.4   HEMATOCRIT  45.2   PLATELETCT  229     Recent Labs      17   WBC  13.3*   NEUTSPOLYS  57.70   LYMPHOCYTES  33.40   MONOCYTES  5.00   EOSINOPHILS  2.00   BASOPHILS  0.30   ASTSGOT  16   ALTSGPT  41   ALKPHOSPHAT  65   TBILIRUBIN  0.4           Hemodynamics:  Temp (24hrs), Av.6 °C (97.8 °F), Min:35.7 °C (96.2 °F), Max:37 °C (98.6 °F)  Temperature: 37 °C (98.6 °F)  Pulse  Av.9  Min: 59  Max: 114   Blood Pressure: 141/83 mmHg     Respiratory:    Respiration: 18, Pulse Oximetry: 96 %     Work Of Breathing / Effort: Mild  RUL Breath Sounds: Clear, RML Breath Sounds: Clear, RLL Breath Sounds: Clear, MARY Breath Sounds: Clear, LLL Breath Sounds: Clear  Fluids:    Intake/Output Summary (Last 24 hours) at 17 1507  Last data filed at 17 0600   Gross per 24 hour   Intake      0 ml   Output   1000 ml   Net  -1000 ml        GI/Nutrition:  Orders Placed This Encounter   Procedures   • Diet Order     Standing Status: Standing      Number of Occurrences: 1      Standing Expiration Date:      Order Specific Question:  Diet:     Answer:  Diabetic [3]     Order Specific Question:  Texture/Fiber modifications:     Answer:  Dysphagia 2(Pureed/Chopped)specify fluid consistency(question 6) [2]     Order Specific Question:  Consistency/Fluid modifications:     Answer:  Thin Liquids [3]     Medical Decision Making, by Problem:  Active Hospital Problems    Diagnosis   • *Quadriparesis (CMS-HCC) [G82.50]     Prednisone to taper off decreased to 40 mg today, muscle biopsy done   • HTN (hypertension) [I10]      No current meds   • Chronic pain syndrome [G89.4]       Fentanyl patch, Oxycodone,  Neurontin   • Dysphagia [R13.10]        Dysphagia 2   • Chronic inflammatory arthritis [M19.90]       previously on Plaquenil   • Suprapubic catheter (CMS-HCC) [Z93.59]          • Schizophrenia (CMS-HCC) [F20.9]      Geodon, Prozac   • TONYA on CPAP [G47.33]          may use Home CPAP   • Hypothyroidism [E03.9]         Synthroid        Constipation.   Bowel protocol, Relistor given yesterday, Enema today    Medications reviewed, Labs reviewed, Radiology images reviewed and EKG reviewed  Andrade catheter: Neurogenic Bladder      DVT Prophylaxis: Enoxaparin (Lovenox)    Ulcer prophylaxis: Yes    Assessed for rehab: Patient was assess for and/or received rehabilitation services during this hospitalization

## 2017-01-30 NOTE — CARE PLAN
Problem: Safety  Goal: Will remain free from falls  Outcome: PROGRESSING AS EXPECTED    Problem: Bowel/Gastric:  Goal: Will not experience complications related to bowel motility  Outcome: PROGRESSING SLOWER THAN EXPECTED  Multiple attempts to toilet pt were unsuccessful    Problem: Psychosocial Needs:  Goal: Level of anxiety will decrease  Outcome: PROGRESSING AS EXPECTED

## 2017-01-31 ENCOUNTER — APPOINTMENT (OUTPATIENT)
Dept: BEHAVIORAL HEALTH | Facility: PHYSICIAN GROUP | Age: 27
End: 2017-01-31
Payer: MEDICARE

## 2017-01-31 LAB
CRYOGLOB SER QL 3D COLD INC: NORMAL
GLUCOSE BLD-MCNC: 112 MG/DL (ref 65–99)
GLUCOSE BLD-MCNC: 136 MG/DL (ref 65–99)
GLUCOSE BLD-MCNC: 153 MG/DL (ref 65–99)
GLUCOSE BLD-MCNC: 80 MG/DL (ref 65–99)
TEST NAME 95000: NORMAL

## 2017-01-31 PROCEDURE — 700112 HCHG RX REV CODE 229: Performed by: HOSPITALIST

## 2017-01-31 PROCEDURE — 700102 HCHG RX REV CODE 250 W/ 637 OVERRIDE(OP): Performed by: FAMILY MEDICINE

## 2017-01-31 PROCEDURE — 99232 SBSQ HOSP IP/OBS MODERATE 35: CPT | Performed by: HOSPITALIST

## 2017-01-31 PROCEDURE — 82962 GLUCOSE BLOOD TEST: CPT

## 2017-01-31 PROCEDURE — 92526 ORAL FUNCTION THERAPY: CPT

## 2017-01-31 PROCEDURE — A9270 NON-COVERED ITEM OR SERVICE: HCPCS | Performed by: FAMILY MEDICINE

## 2017-01-31 PROCEDURE — 700102 HCHG RX REV CODE 250 W/ 637 OVERRIDE(OP): Performed by: HOSPITALIST

## 2017-01-31 PROCEDURE — 700111 HCHG RX REV CODE 636 W/ 250 OVERRIDE (IP): Performed by: HOSPITALIST

## 2017-01-31 PROCEDURE — 97530 THERAPEUTIC ACTIVITIES: CPT

## 2017-01-31 PROCEDURE — 770006 HCHG ROOM/CARE - MED/SURG/GYN SEMI*

## 2017-01-31 PROCEDURE — G8998 SWALLOW D/C STATUS: HCPCS | Mod: CI

## 2017-01-31 PROCEDURE — A9270 NON-COVERED ITEM OR SERVICE: HCPCS | Performed by: HOSPITALIST

## 2017-01-31 PROCEDURE — G8997 SWALLOW GOAL STATUS: HCPCS | Mod: CI

## 2017-01-31 PROCEDURE — 97535 SELF CARE MNGMENT TRAINING: CPT

## 2017-01-31 PROCEDURE — 700111 HCHG RX REV CODE 636 W/ 250 OVERRIDE (IP): Performed by: FAMILY MEDICINE

## 2017-01-31 PROCEDURE — 302118 SHAMPOO,NO RINSE: Performed by: HOSPITALIST

## 2017-01-31 PROCEDURE — 97116 GAIT TRAINING THERAPY: CPT

## 2017-01-31 RX ADMIN — NAPROXEN 500 MG: 500 TABLET ORAL at 19:38

## 2017-01-31 RX ADMIN — Medication 1 TABLET: at 19:39

## 2017-01-31 RX ADMIN — OXYBUTYNIN CHLORIDE 10 MG: 10 TABLET, FILM COATED, EXTENDED RELEASE ORAL at 08:38

## 2017-01-31 RX ADMIN — LACTULOSE 30 ML: 10 SOLUTION ORAL at 08:39

## 2017-01-31 RX ADMIN — CYANOCOBALAMIN TAB 500 MCG 1000 MCG: 500 TAB at 22:07

## 2017-01-31 RX ADMIN — LEVOTHYROXINE SODIUM 75 MCG: 75 TABLET ORAL at 05:38

## 2017-01-31 RX ADMIN — OXYCODONE HYDROCHLORIDE 10 MG: 10 TABLET ORAL at 18:02

## 2017-01-31 RX ADMIN — OMEPRAZOLE 20 MG: 20 CAPSULE, DELAYED RELEASE ORAL at 19:38

## 2017-01-31 RX ADMIN — ZIPRASIDONE HCL 160 MG: 40 CAPSULE ORAL at 19:37

## 2017-01-31 RX ADMIN — CYCLOBENZAPRINE HYDROCHLORIDE 10 MG: 10 TABLET, FILM COATED ORAL at 19:39

## 2017-01-31 RX ADMIN — Medication 650 MG: at 19:37

## 2017-01-31 RX ADMIN — LACTULOSE 30 ML: 10 SOLUTION ORAL at 19:39

## 2017-01-31 RX ADMIN — NAPROXEN 500 MG: 500 TABLET ORAL at 08:39

## 2017-01-31 RX ADMIN — OXYCODONE HYDROCHLORIDE 10 MG: 10 TABLET ORAL at 13:25

## 2017-01-31 RX ADMIN — DOCUSATE SODIUM 100 MG: 100 CAPSULE ORAL at 08:39

## 2017-01-31 RX ADMIN — OMEPRAZOLE 20 MG: 20 CAPSULE, DELAYED RELEASE ORAL at 08:38

## 2017-01-31 RX ADMIN — OXYBUTYNIN CHLORIDE 10 MG: 10 TABLET, FILM COATED, EXTENDED RELEASE ORAL at 19:39

## 2017-01-31 RX ADMIN — INSULIN LISPRO 2 UNITS: 100 INJECTION, SOLUTION INTRAVENOUS; SUBCUTANEOUS at 19:43

## 2017-01-31 RX ADMIN — OXYCODONE HYDROCHLORIDE 10 MG: 10 TABLET ORAL at 05:38

## 2017-01-31 RX ADMIN — ACETAMINOPHEN 650 MG: 325 TABLET, FILM COATED ORAL at 13:25

## 2017-01-31 RX ADMIN — GABAPENTIN 600 MG: 400 CAPSULE ORAL at 08:38

## 2017-01-31 RX ADMIN — Medication 650 MG: at 08:39

## 2017-01-31 RX ADMIN — CYCLOBENZAPRINE HYDROCHLORIDE 10 MG: 10 TABLET, FILM COATED ORAL at 08:38

## 2017-01-31 RX ADMIN — FLUOXETINE 10 MG: 10 CAPSULE ORAL at 08:38

## 2017-01-31 RX ADMIN — CYANOCOBALAMIN TAB 500 MCG 1000 MCG: 500 TAB at 08:38

## 2017-01-31 RX ADMIN — ENOXAPARIN SODIUM 40 MG: 100 INJECTION SUBCUTANEOUS at 08:39

## 2017-01-31 RX ADMIN — Medication 650 MG: at 16:51

## 2017-01-31 RX ADMIN — PREDNISONE 40 MG: 20 TABLET ORAL at 08:38

## 2017-01-31 RX ADMIN — GABAPENTIN 1200 MG: 400 CAPSULE ORAL at 19:38

## 2017-01-31 ASSESSMENT — ENCOUNTER SYMPTOMS
VOMITING: 0
DIARRHEA: 0
DEPRESSION: 1
NAUSEA: 0
MYALGIAS: 0
CONSTIPATION: 0
HEADACHES: 0
ABDOMINAL PAIN: 0
DIZZINESS: 0
CHILLS: 0
SHORTNESS OF BREATH: 0
PALPITATIONS: 0
WEAKNESS: 1
FOCAL WEAKNESS: 0
FEVER: 0
COUGH: 0

## 2017-01-31 ASSESSMENT — PAIN SCALES - GENERAL
PAINLEVEL_OUTOF10: 5

## 2017-01-31 ASSESSMENT — GAIT ASSESSMENTS
GAIT LEVEL OF ASSIST: MINIMAL ASSIST
DISTANCE (FEET): 5
ASSISTIVE DEVICE: FRONT WHEEL WALKER

## 2017-01-31 NOTE — PROGRESS NOTES
"Patient consumed 2 large bags of flavored chips this shift in addition to other snacks at bedside. Education provided on Carb control diet and patient dismissed it's application to her needs stating \"the steroids make me really hungry\".  Alternatives offered to her snacks of choice; patient declined healthier choices.   "

## 2017-01-31 NOTE — PROGRESS NOTES
Pt is upset regarding placement issues. SW is aware and working to find SNF/Rehab placement. Educated pt regarding dietary restrictions/recommendations for Diabetic diet. Pt continues to snack on 1-2 bags of chips and diet cokes. Pt is requesting PT/OT assessment and shower today. Therapies are aware. No further c/o CP. Breathing even and unlabored. HRRR. NAD at this time.

## 2017-01-31 NOTE — CARE PLAN
Problem: Safety  Goal: Will remain free from injury  Outcome: PROGRESSING AS EXPECTED    Problem: Infection  Goal: Will remain free from infection  Outcome: PROGRESSING AS EXPECTED

## 2017-01-31 NOTE — DISCHARGE PLANNING
Received choice form from Cande([email or web address]). Referral sent to [facility], [facility], [facility] and [facility].

## 2017-01-31 NOTE — PROGRESS NOTES
"Pt continues to have difficulty passing stool. Have tried to encourage pt to mobilize as much as she may tolerate, but she is resistant. Education provided. Encouraging increased water intake. Pt states, \"makes me nauseated.\" She is eating and drinking well. Has been snacking on pork rinds throughout the day.   "

## 2017-01-31 NOTE — FLOWSHEET NOTE
"   01/31/17 0814   Events/Summary/Plan   Events/Summary/Plan Pt stated she had a hard time sleeping with CPAP last night \"makes me feel claustrophobic\" educated pt on importance of wearing CPAP. No distress noted at this time.      "

## 2017-01-31 NOTE — CARE PLAN
Problem: Communication  Goal: The ability to communicate needs accurately and effectively will improve  Outcome: PROGRESSING AS EXPECTED    Problem: Pain Management  Goal: Pain level will decrease to patient’s comfort goal  Outcome: PROGRESSING AS EXPECTED    Problem: Mobility  Goal: Risk for activity intolerance will decrease  Outcome: PROGRESSING SLOWER THAN EXPECTED  Refuses

## 2017-01-31 NOTE — DISCHARGE PLANNING
Transitional Care Navigator:    Pt has been declined by all [place] SNF's and refuses referral to [facility].  Pt wants to go home with a hospital bed and home health.  Pt has been on [facility] in the past.  TCN notified Hospitalist RN and will continue to follow for MD recommendations.  Sw aware.

## 2017-01-31 NOTE — DISCHARGE PLANNING
Transitional Care Navigator:    TCN met with patient and grandmother to discuss SNF referrals.  [facility] has declined.  TCN suggested extending SNF referrals to all [place] [facility]ilities except [facility] per pt preference.  Pt in agreement.  Choice form completed and faxed to [facility]. Sw aware.  TCN will follow-up as needed.

## 2017-01-31 NOTE — CARE PLAN
Problem: Bowel/Gastric:  Goal: Normal bowel function is maintained or improved  Outcome: PROGRESSING SLOWER THAN EXPECTED  Pt did have successful BM yesterday. Will continue to utilize stool softeners and laxatives for continued bowel program.    Problem: Pain Management  Goal: Pain level will decrease to patient’s comfort goal  Outcome: PROGRESSING AS EXPECTED  Current pain mgmt regimen is controlling pain.    Problem: Skin Integrity  Goal: Risk for impaired skin integrity will decrease  Outcome: PROGRESSING AS EXPECTED

## 2017-01-31 NOTE — PROGRESS NOTES
Hospital Medicine Progress Note, Adult, Complex               Author: Bolivar DEANDRA GallowayHackett Date & Time created: 1/31/2017  3:23 PM     27 yo F with quadriparesis of unknown source, ? psychiatric component    Interval History:  1/31 - discussed that [facility] declined; SNF referrals to other facilities placed. Discussed with patient and mom at bedside at length. She wonders if she shouldn't just go home. Advised this would not help her get better any faster. She is agreeable to wait    Review of Systems:  Review of Systems   Constitutional: Positive for malaise/fatigue. Negative for fever and chills.   Respiratory: Negative for cough and shortness of breath.    Cardiovascular: Negative for chest pain and palpitations.   Gastrointestinal: Negative for nausea, vomiting, abdominal pain, diarrhea and constipation.   Genitourinary: Negative for dysuria.   Musculoskeletal: Positive for joint pain. Negative for myalgias.   Skin: Negative for itching.   Neurological: Positive for weakness. Negative for dizziness, focal weakness and headaches.   Psychiatric/Behavioral: Positive for depression.   All other systems reviewed and are negative.      Physical Exam:  Physical Exam   Constitutional: She is oriented to person, place, and time. She appears well-developed and well-nourished.   HENT:   Head: Normocephalic and atraumatic.   Mouth/Throat: Oropharynx is clear and moist.   Eyes: Conjunctivae and EOM are normal. Pupils are equal, round, and reactive to light. No scleral icterus.   Neck: Normal range of motion. Neck supple. No tracheal deviation present. No thyromegaly present.   Cardiovascular: Normal rate, regular rhythm, normal heart sounds and intact distal pulses.    No murmur heard.  Pulmonary/Chest: Effort normal and breath sounds normal. No respiratory distress. She has no wheezes.   Abdominal: Soft. Bowel sounds are normal. She exhibits no distension. There is no tenderness.   Musculoskeletal: Normal range of motion. She  exhibits no edema or tenderness.   Decreased strength throughout; varying strength   Lymphadenopathy:     She has no cervical adenopathy.        Right: No supraclavicular adenopathy present.        Left: No supraclavicular adenopathy present.   Neurological: She is alert and oriented to person, place, and time. No cranial nerve deficit.   Skin: Skin is warm and dry.   Vitals reviewed.      Labs:        Invalid input(s): [id number]  Recent Labs      17   1513   TROPONINI  <0.01     Recent Labs      17   0822   SODIUM  134*   POTASSIUM  4.2   CHLORIDE  100   CO2  24   BUN  21   CREATININE  0.79   CALCIUM  9.2     Recent Labs      17   0822   ALTSGPT  41   ASTSGOT  16   ALKPHOSPHAT  65   TBILIRUBIN  0.4   GLUCOSE  101*     Recent Labs      17   0822   RBC  5.21   HEMOGLOBIN  15.4   HEMATOCRIT  45.2   PLATELETCT  229     Recent Labs      17   0822   WBC  13.3*   NEUTSPOLYS  57.70   LYMPHOCYTES  33.40   MONOCYTES  5.00   EOSINOPHILS  2.00   BASOPHILS  0.30   ASTSGOT  16   ALTSGPT  41   ALKPHOSPHAT  65   TBILIRUBIN  0.4           Hemodynamics:  Temp (24hrs), Av.4 °C (97.5 °F), Min:35.7 °C (96.2 °F), Max:37.1 °C (98.7 °F)  Temperature: (!) 35.7 °C (96.2 °F)  Pulse  Av.9  Min: 59  Max: 114   Blood Pressure: 147/69 mmHg     Respiratory:    Respiration: 18, Pulse Oximetry: 97 %     Work Of Breathing / Effort: Mild  RUL Breath Sounds: Clear, RML Breath Sounds: Clear, RLL Breath Sounds: Clear, MARY Breath Sounds: Clear, LLL Breath Sounds: Clear  Fluids:    Intake/Output Summary (Last 24 hours) at 17 1523  Last data filed at 17 0600   Gross per 24 hour   Intake    420 ml   Output   1700 ml   Net  -1280 ml        GI/Nutrition:  Orders Placed This Encounter   Procedures   • Diet Order     Standing Status: Standing      Number of Occurrences: 1      Standing Expiration Date:      Order Specific Question:  Diet:     Answer:  Diabetic [3]     Order Specific Question:   Texture/Fiber modifications:     Answer:  Dysphagia 2(Pureed/Chopped)specify fluid consistency(question 6) [2]     Order Specific Question:  Consistency/Fluid modifications:     Answer:  Thin Liquids [3]     Medical Decision Making, by Problem:  Active Hospital Problems    Diagnosis   • *Quadriparesis (CMS-HCC) [G82.50]       Muscle bx done, sent to outside lab     • HTN (hypertension) [I10]        SBP goal less than 140 mmHg  DBP goal less than 90 mmHg  PRN and antihypertensives to titrate by hospitalist towards goal  Most recent Blood Pressure: 147/69 mmHg  May need meds as an outpatient     • Chronic pain syndrome [G89.4]         Fentanyl patch, Oxycodone, Neurontin  Needs to be de-escalated     • Dysphagia [R13.10]          Dysphagia 2     • Chronic inflammatory arthritis [M19.90]         previously on Plaquenil     • Suprapubic catheter (CMS-HCC) [Z93.59]            • Schizophrenia (CMS-HCC) [F20.9]        Geodon, Prozac     • TONYA on CPAP [G47.33]            Home CPAP     • Hypothyroidism [E03.9]           Synthroid         Dispo: SNF pending    Medications reviewed and Labs reviewed  Andrade catheter: Neurogenic Bladder      DVT Prophylaxis: Enoxaparin (Lovenox)    Ulcer prophylaxis: Not indicated

## 2017-02-01 VITALS
BODY MASS INDEX: 47.29 KG/M2 | HEIGHT: 62 IN | SYSTOLIC BLOOD PRESSURE: 131 MMHG | WEIGHT: 257 LBS | TEMPERATURE: 97.4 F | OXYGEN SATURATION: 94 % | HEART RATE: 85 BPM | DIASTOLIC BLOOD PRESSURE: 80 MMHG | RESPIRATION RATE: 17 BRPM

## 2017-02-01 LAB
GLUCOSE BLD-MCNC: 119 MG/DL (ref 65–99)
GLUCOSE BLD-MCNC: 83 MG/DL (ref 65–99)

## 2017-02-01 PROCEDURE — 700102 HCHG RX REV CODE 250 W/ 637 OVERRIDE(OP): Performed by: HOSPITALIST

## 2017-02-01 PROCEDURE — A9270 NON-COVERED ITEM OR SERVICE: HCPCS | Performed by: HOSPITALIST

## 2017-02-01 PROCEDURE — A9270 NON-COVERED ITEM OR SERVICE: HCPCS | Performed by: FAMILY MEDICINE

## 2017-02-01 PROCEDURE — 82962 GLUCOSE BLOOD TEST: CPT | Mod: 91

## 2017-02-01 PROCEDURE — 700102 HCHG RX REV CODE 250 W/ 637 OVERRIDE(OP): Performed by: FAMILY MEDICINE

## 2017-02-01 PROCEDURE — 700112 HCHG RX REV CODE 229: Performed by: HOSPITALIST

## 2017-02-01 PROCEDURE — 700111 HCHG RX REV CODE 636 W/ 250 OVERRIDE (IP): Performed by: FAMILY MEDICINE

## 2017-02-01 PROCEDURE — 99239 HOSP IP/OBS DSCHRG MGMT >30: CPT | Performed by: HOSPITALIST

## 2017-02-01 PROCEDURE — 700111 HCHG RX REV CODE 636 W/ 250 OVERRIDE (IP): Performed by: HOSPITALIST

## 2017-02-01 RX ADMIN — ACETAMINOPHEN 650 MG: 325 TABLET, FILM COATED ORAL at 06:14

## 2017-02-01 RX ADMIN — POLYETHYLENE GLYCOL 3350 1 PACKET: 17 POWDER, FOR SOLUTION ORAL at 07:37

## 2017-02-01 RX ADMIN — CYCLOBENZAPRINE HYDROCHLORIDE 10 MG: 10 TABLET, FILM COATED ORAL at 07:38

## 2017-02-01 RX ADMIN — OXYCODONE HYDROCHLORIDE 20 MG: 10 TABLET ORAL at 07:38

## 2017-02-01 RX ADMIN — LACTULOSE 30 ML: 10 SOLUTION ORAL at 07:38

## 2017-02-01 RX ADMIN — OMEPRAZOLE 20 MG: 20 CAPSULE, DELAYED RELEASE ORAL at 07:40

## 2017-02-01 RX ADMIN — DOCUSATE SODIUM 100 MG: 100 CAPSULE ORAL at 07:38

## 2017-02-01 RX ADMIN — FLUOXETINE 10 MG: 10 CAPSULE ORAL at 07:38

## 2017-02-01 RX ADMIN — ENOXAPARIN SODIUM 40 MG: 100 INJECTION SUBCUTANEOUS at 07:42

## 2017-02-01 RX ADMIN — CYANOCOBALAMIN TAB 500 MCG 1000 MCG: 500 TAB at 07:38

## 2017-02-01 RX ADMIN — PREDNISONE 40 MG: 20 TABLET ORAL at 07:38

## 2017-02-01 RX ADMIN — GABAPENTIN 600 MG: 400 CAPSULE ORAL at 07:38

## 2017-02-01 RX ADMIN — NAPROXEN 500 MG: 500 TABLET ORAL at 07:38

## 2017-02-01 RX ADMIN — OXYCODONE HYDROCHLORIDE 10 MG: 10 TABLET ORAL at 03:24

## 2017-02-01 RX ADMIN — LEVOTHYROXINE SODIUM 75 MCG: 75 TABLET ORAL at 07:00

## 2017-02-01 RX ADMIN — OXYBUTYNIN CHLORIDE 10 MG: 10 TABLET, FILM COATED, EXTENDED RELEASE ORAL at 07:38

## 2017-02-01 RX ADMIN — Medication 650 MG: at 07:42

## 2017-02-01 ASSESSMENT — PAIN SCALES - GENERAL
PAINLEVEL_OUTOF10: 7
PAINLEVEL_OUTOF10: 9
PAINLEVEL_OUTOF10: 5
PAINLEVEL_OUTOF10: 7
PAINLEVEL_OUTOF10: 7

## 2017-02-01 NOTE — DISCHARGE INSTRUCTIONS
Discharge Instructions    Discharged to home by car with relative. Discharged via wheelchair, hospital escort: Yes.  Special equipment needed: Not Applicable and Walker    Be sure to schedule a follow-up appointment with your primary care doctor or any specialists as instructed.     Discharge Plan:   Diet Plan: Discussed  Activity Level: Discussed  Confirmed Follow up Appointment: Patient to Call and Schedule Appointment  Confirmed Symptoms Management: Discussed  Medication Reconciliation Updated: Yes  Pneumococcal Vaccine Given - only chart on this line when given: Given (See MAR)  Influenza Vaccine Indication: Not indicated: Previously immunized this influenza season and > 8 years of age    I understand that a diet low in cholesterol, fat, and sodium is recommended for good health. Unless I have been given specific instructions below for another diet, I accept this instruction as my diet prescription.   Other diet: diabetic, dysphagia 2 diet    Special Instructions: None    · Is patient discharged on Warfarin / Coumadin?   No     · Is patient Post Blood Transfusion?  No    Depression / Suicide Risk    As you are discharged from this [facility] facility, it is important to learn how to keep safe from harming yourself.    Recognize the warning signs:  · Abrupt changes in personality, positive or negative- including increase in energy   · Giving away possessions  · Change in eating patterns- significant weight changes-  positive or negative  · Change in sleeping patterns- unable to sleep or sleeping all the time   · Unwillingness or inability to communicate  · Depression  · Unusual sadness, discouragement and loneliness  · Talk of wanting to die  · Neglect of personal appearance   · Rebelliousness- reckless behavior  · Withdrawal from people/activities they love  · Confusion- inability to concentrate     If you or a loved one observes any of these behaviors or has concerns about self-harm, here's what you can  do:  · Talk about it- your feelings and reasons for harming yourself  · Remove any means that you might use to hurt yourself (examples: pills, rope, extension cords, firearm)  · Get professional help from the community (Mental Health, Substance Abuse, psychological counseling)  · Do not be alone:Call your Safe Contact- someone whom you trust who will be there for you.  · Call your local CRISIS HOTLINE 408-1187 or 481-237-4568  · Call your local Children's Mobile Crisis Response Team [place] (233) 615-1143 or wwwHelpr  · Call the toll free National Suicide Prevention Hotlines   · National Suicide Prevention Lifeline 481-415-FBLQ (0391)  · National Hope Line Network 800-SUICIDE (682-9357)    Dysphagia Diet Level 2, Mechanically Altered  The dysphagia level 2 diet includes foods that are blended, chopped, ground, or mashed so they are easier to chew and swallow. The foods are soft, moist, and can be chopped into ¼-inch chunks (such as pancakes, pasta, and bananas).  In order to be on this diet, you must be able to chew. This diet helps you transition between the pureed textures of the dysphagia level 1 diet to more solid textures. This diet is helpful for people with mild to moderate swallowing difficulties. It reduces the risk of food getting caught in the windpipe, trachea, or lungs.   You may need help or supervision during meals while following this diet so that you eat safely. You will be on this diet until your health care provider advances the texture of your diet.   WHAT DO I NEED TO KNOW ABOUT THIS DIET?  Foods  · You may eat foods that are soft and moist.  · You may need to use a , whisk, or masher to soften some of your foods.  · You can moisten foods with gravies, sauces, vegetable or fruit juice, milk, half and half, or water when blending, mashing, or grinding your foods to the right consistency.  · If you were on the dysphagia level 1 diet, you may still eat any of the foods  included in that diet.  · Avoid foods that are dry, hard, sticky, chewy, coarse, and crunchy. Also avoid large cuts of food.  · Take small bites. Each bite should contain ¼ inch or less of food.  Liquids  · Avoid liquids with seeds and chunks.  · Thicken liquids, if instructed by your health care provider. Your health care provider will tell you the consistency to which you should thicken your liquids for safe swallowing. To thicken a liquid, use a commercial thickener or a thickening food (such as rice cereal or potato flakes). Ask your health care provider for specific recommendations on thickeners.  See your dietitian or health care provider regularly for help with your dietary changes.  WHAT FOODS CAN I EAT?  Grains  Store-bought soft breads that do not have nuts or seeds. Pancakes, sweet rolls, British pastries, and Togolese toast that have been moistened with syrup or sauce to form a slurry when blended. Well-cooked pasta, noodles, and bread dressing. Well-cooked noodles and pasta in sauce. Moist macaroni and cheese. Soft dumplings or spaetzle with gravy or butter. Cooked cereals (including oatmeal). Low-texture dry cereals, such as rice puff, corn, or wheat-flake cereals, with milk (if thin liquids are not allowed, make sure all of the milk is absorbed by the cereal before eating it).  Vegetables  Very soft, well-cooked vegetables in pieces less than ½ inch in size. Cooked potatoes that are moist, not crispy, and with sauce.  Fruits  Canned or cooked fruits that are soft or moist and do not have skin or seeds. Fresh, soft bananas. Fruit juices with a small amount of pulp (if thin liquids are allowed). Gelatin or plain gelatin with canned fruit, except pineapple.  Meat and Other Protein Sources  Tender, moist meats, poultry, or fish cooked with gravy or sauce and cubed to ¼-inch bites or smaller. Ground meat. Moist meatball or meatloaf. Fish without bones. Moist casseroles without rice. Tuna, egg, or meat salad  without chunks or hard-to-chew vegetables, such as celery and onions. Smooth quiche without large chunks. Scrambled, poached, or soft-cooked eggs with butter, margarine, sauce, or gravy. Tofu. Well-cooked, moistened and mashed beans, peas, baked beans, and other legumes. Casseroles without rice (such as tuna noodle casserole or soft moist meat lasagna).  Dairy  Cream cheese. Yogurt. Cottage cheese. Ask your health care provider if milk is allowed.  Sweets/Desserts  Pudding. Custard. Soft fruit pies with crust on the bottom only. Crisps and cobblers without seeds or nuts and with soft crusts. Soft, moist cakes. Icing. Pre-gelled cookies. Soft, moist cookies dunked in milk, coffee, or another liquid. Jelly. Soft, smooth chocolate bars that are easily chewed. Jams and preserves without seeds. Ask your health care provider whether you can have frozen desserts.  Fats and Oils  Butter. Margarine. Cream for cereal, depending on liquid consistency allowed. Gravy. Cream sauces. Mayonnaise. Salad dressings. Cream cheese. Cheese spreads, plain or with soft fruits or vegetables added. Sour cream. Sour cream dips with soft fruits or vegetables added. Whipped toppings.  Other  Sauces and salsas that have soft chunks that are about ½ inch or smaller.  The items listed above may not be a complete list of recommended foods or beverages. Contact your dietitian for more options.  WHAT FOODS ARE NOT RECOMMENDED?  Grains  All breads not listed in the recommended list. Breads that are hard or have nuts or seeds. Coarse cereals. Cereals that have nuts, seeds, dried fruits, or coconut. Rice. Corn.  Vegetables  Whole, raw, frozen, or dried vegetables. Tough, fibrous, chewy, or stringy cooked vegetables, such as celery, peas, broccoli, cabbage, [place] sprouts, and asparagus. Potato skins. Potato and other vegetable chips. Fried or Vietnamese-fried potatoes. Cooked corn and peas.  Fruits  Whole raw, frozen, or dried fruits, including coconut.  Pineapple. Fruits with seeds.  Meat and Other Protein Sources  Dry, tough meats, such as cabezas, sausage, and hot dogs. Cheese slices and cubes. Peanut butter. Hard boiled or fried eggs. Nuts. Seeds. Pizza. Sandwiches. Dry casseroles or casseroles with rice or large chunks.  Dairy  Yogurt with nuts, seeds, or large chunks.  Sweets/Desserts  Coarse, hard, chewy, or sticky desserts. Any dessert with nuts, seeds, coconut, pineapple, or dried fruit. Ask your health care provider whether you can have frozen desserts.  Fats and Oils  Avoid fats with chunky, large textures, such as those with nuts or fruits.  Other  Soups and casseroles with large chunks.  The items listed above may not be a complete list of foods and beverages to avoid. Contact your dietitian for more information.     This information is not intended to replace advice given to you by your health care provider. Make sure you discuss any questions you have with your health care provider.     Document Released: 12/18/2006 Document Revised: 01/08/2016 Document Reviewed: 12/01/2014  [employer] Patient Education ©2016 [employer]    Diets for Diabetes, Food Labeling  Look at food labels to help you decide how much of a product you can eat. You will want to check the amount of total carbohydrate in a serving to see how the food fits into your meal plan. In the list of ingredients, the ingredient present in the largest amount by weight must be listed first, followed by the other ingredients in descending order.  STANDARD OF IDENTITY  Most products have a list of ingredients. However, foods that the Food and Drug Administration (FDA) has given a standard of identity do not need a list of ingredients. A standard of identity means that a food must contain certain ingredients if it is called a particular name. Examples are mayonnaise, peanut butter, ketchup, jelly, and cheese.  LABELING TERMS  There are many terms found on food labels. Some of these terms  "have specific definitions. Some terms are regulated by the FDA, and the FDA has clearly specified how they can be used. Others are not regulated or well-defined and can be misleading and confusing.  SPECIFICALLY DEFINED TERMS  Nutritive Sweetener.  · A sweetener that contains calories,such as table sugar or honey.  Nonnutritive Sweetener.  · A sweetener with few or no calories,such as saccharin, aspartame, sucralose, and cyclamate.  LABELING TERMS REGULATED BY THE FDA  Free.  · The product contains only a tiny or small amount of fat, cholesterol, sodium, sugar, or calories. For example, a \"fat-free\" product will contain less than 0.5 g of fat per serving.  Low.  · A food described as \"low\" in fat, saturated fat, cholesterol, sodium, or calories could be eaten fairly often without exceeding dietary guidelines. For example, \"low in fat\" means no more than 3 g of fat per serving.  Lean.  · \"Lean\" and \"extra lean\" are U.S. Department of Agriculture (USDA) terms for use on meat and poultry products. \"Lean\" means the product contains less than 10 g of fat, 4 g of saturated fat, and 95 mg of cholesterol per serving. \"Lean\" is not as low in fat as a product labeled \"low.\"  Extra Lean.  · \"Extra lean\" means the product contains less than 5 g of fat, 2 g of saturated fat, and 95 mg of cholesterol per serving. While \"extra lean\" has less fat than \"lean,\" it is still higher in fat than a product labeled \"low.\"  Reduced, Less, Fewer.  · A diet product that contains 25% less of a nutrient or calories than the regular version. For example, hot dogs might be labeled \"25% less fat than our regular hot dogs.\"  Light/Lite.  · A diet product that contains  fewer calories or ½ the fat of the original. For example, \"light in sodium\" means a product with ½ the usual sodium.  More.  · One serving contains at least 10% more of the daily value of a vitamin, mineral, or fiber than usual.  Good Source Of.  · One serving contains 10% to 19% of " "the daily value for a particular vitamin, mineral, or fiber.  Excellent Source Of.  · One serving contains 20% or more of the daily value for a particular nutrient. Other terms used might be \"high in\" or \"rich in.\"  Enriched or Fortified.  · The product contains added vitamins, minerals, or protein. Nutrition labeling must be used on enriched or fortified foods.  Imitation.  · The product has been altered so that it is lower in protein, vitamins, or minerals than the usual food,such as imitation peanut butter.  Total Fat.  · The number listed is the total of all fat found in a serving of the product. Under total fat, food labels must list saturated fat and trans fat, which are associated with raising bad cholesterol and an increased risk of heart blood vessel disease.  Saturated Fat.  · Mainly fats from animal-based sources. Some examples are red meat, cheese, cream, whole milk, and coconut oil.  Trans Fat.  · Found in some fried snack foods, packaged foods, and fried restaurant foods. It is recommended you eat as close to 0 g of trans fat as possible, since it raises bad cholesterol and lowers good cholesterol.  Polyunsaturated and Monounsaturated Fats.  · More healthful fats. These fats are from plant sources.  Total Carbohydrate.  · The number of carbohydrate grams in a serving of the product. Under total carbohydrate are listed the other carbohydrate sources, such as dietary fiber and sugars.  Dietary Fiber.  · A carbohydrate from plant sources.  Sugars.  · Sugars listed on the label contain all naturally occurring sugars as well as added sugars.  LABELING TERMS NOT REGULATED BY THE FDA  Sugarless.  · Table sugar (sucrose) has not been added. However, the  may use another form of sugar in place of sucrose to alex the product. For example, sugar alcohols are used to alex foods. Sugar alcohols are a form of sugar but are not table sugar. If a product contains sugar alcohols in place of sucrose, " "it can still be labeled \"sugarless.\"  Low Salt, Salt-Free, Unsalted, No Salt, No Salt Added, Without Added Salt.  · Food that is usually processed with salt has been made without salt. However, the food may contain sodium-containing additives, such as preservatives, leavening agents, or flavorings.  Natural.  · This term has no legal meaning.  Organic.  · Foods that are certified as organic have been inspected and approved by the USDA to ensure they are produced without pesticides, fertilizers containing synthetic ingredients, bioengineering, or ionizing radiation.  Document Released: 12/20/2004 Document Revised: 03/11/2013 Document Reviewed: 07/08/2010  [employer]® Patient Information ©2014 [employer]    "

## 2017-02-01 NOTE — PROGRESS NOTES
Patient is AOx4, states generalized pain, medicating per MAR, hypoactive bowel sounds, lung sounds clear/diminished, call light within reach, hourly rounding.

## 2017-02-01 NOTE — PROGRESS NOTES
Assumed pt care at 0700.  Received report from NEEL Guzman RN.  AM assessment completed.  AAOx4. Pt denies SOB; c/o pain of 9 on 0 to 10 pain scale (administered PRN medication per MAR).  Pt educated on use of call light and bed alarm, provided pt with RN and CNA extension numbers on white board and encouraged pt to call when needed, and discussed plan of care for the day (pain control, safety, comfort, OOB to chair and ambulate); pt verbalizes understanding.  Denies any additional needs at this time.  Call light and phone within reach; treaded socks in place and bed alarm off.  Hourly rounding in place.  Will continue to monitor.

## 2017-02-01 NOTE — CARE PLAN
Problem: Safety  Goal: Will remain free from falls  Outcome: PROGRESSING AS EXPECTED  Fall precautions remain in place: treaded socks on pt, appropriate signs on doorway, armbands on pt , IV pole on side of bathroom, lowest bed rails down, bed in lowest position and locked, call light and phone within reach, bed alarm off per refusal.    Problem: Urinary Elimination:  Goal: Ability to reestablish a normal urinary elimination pattern will improve  Outcome: PROGRESSING AS EXPECTED  Suprapubic catheter to gravity.

## 2017-02-01 NOTE — PROGRESS NOTES
Interval Events:    No acute complaints / events over night.  PRN Oxy given x 1 for pain.  Ice packs provided per patient request.

## 2017-02-01 NOTE — PROGRESS NOTES
Pt dc'd home at 1210.  No IV in place prior to leaving unit.  Pt left with suprapubic catheter in place.  Pt left unit via wheelchair with Cynthia (CNA).  Personal belongings with patient when leaving unit.  Pt given dc instructions prior to leaving unit including prescription and when to visit with PCP; verbalized understanding.  Questions/concerns addressed prior to leaving unit.  Copy of dc instructions signed and in chart; copy of prescription attached to dc instructions.

## 2017-02-02 ENCOUNTER — TELEPHONE (OUTPATIENT)
Dept: MEDICAL GROUP | Facility: MEDICAL CENTER | Age: 27
End: 2017-02-02

## 2017-02-02 ENCOUNTER — PATIENT OUTREACH (OUTPATIENT)
Dept: HEALTH INFORMATION MANAGEMENT | Facility: OTHER | Age: 27
End: 2017-02-02

## 2017-02-02 DIAGNOSIS — R29.898 WEAKNESS OF LOWER EXTREMITY, UNSPECIFIED LATERALITY: ICD-10-CM

## 2017-02-02 NOTE — TELEPHONE ENCOUNTER
1. Caller Name: Kristin Balderrama                                           Call Back Number: [phone] (home)         Patient approves a detailed voicemail message: N\A    2. SPECIFIC Action To Be Taken: Referral for Physical Therapy  Patient stated that she was discharged home from [facility] on 2/1/ 17, they wanted to place the patient in skilled nursing but the patient said there was no one that was willing to take her, patient wanted to know if you would please place a referral for physical therapy? Thank you Please advise    3. Diagnosis/Clinical Reason for Request: Patient is not able to move her legs well    4. Specialty & Provider Name/Lab/Imaging Location: N/A    5. Is appointment scheduled for requested order/referral: NO

## 2017-02-02 NOTE — DISCHARGE SUMMARY
ADMITTING DIAGNOSES:  Numbness and weakness, chronic pain syndrome,   depression, psychosis, esophageal reflux, history of urinary retention, status   post suprapubic catheterization.    DISCHARGE DIAGNOSES:  Quadriparesis without obvious medical explanation,   question of underlying psychiatric disorder, hypertension, chronic pain   syndrome, dysphasia, chronic inflammatory arthritis, urinary retention, status   post suprapubic catheterization present on arrival, schizophrenia,   obstructive sleep apnea on CPAP at home, and hypothyroidism.    CONSULTATIONS:  Sandoval Fox MD, neurology.    PROCEDURES:  Muscle biopsy 01/26/2017.    DISCHARGE MEDICATIONS:   albuterol 108 (90 BASE) MCG/ACT Aero Soln inhalation aerosol  Inhale 2 Puffs by mouth every 6 hours as needed for Shortness of Breath.     cyanocobalamin (HM VITAMIN B12) 500 MCG tablet  Take 1,000 mcg by mouth 2 times a day.     cyclobenzaprine (FLEXERIL) 10 MG Tab  Take 10 mg by mouth 2 Times a Day. Indications: Muscle Spasm     fluoxetine (PROZAC) 10 MG Cap  Take 1 Cap by mouth every day.     Ivabradine HCl (CORLANOR) 5 MG Tab  Take 1 Tab by mouth 2 Times a Day.     lactulose 10 GM/15ML Solution  Take 10 g by mouth 2 times a day.     levothyroxine (SYNTHROID) 75 MCG Tab  Take 75 mcg by mouth Every morning on an empty stomach.     naproxen (NAPROSYN) 500 MG Tab  Take 500 mg by mouth 2 Times a Day.     omeprazole (PRILOSEC) 20 MG delayed-release capsule  Take 20 mg by mouth 2 times a day.     oxybutynin SR (DITROPAN-XL) 10 MG CR tablet  Take 10 mg by mouth 2 Times a Day.     oxycodone-acetaminophen (PERCOCET-10)  MG Tab  Take 2 Tabs by mouth 2 Times a Day. Two tabs  Indications: Pain     promethazine (PHENERGAN) 25 MG Tab  Take 1 Tab by mouth every 6 hours as needed for Nausea/Vomiting.     ranitidine (ZANTAC) 150 MG Tab  Take 150 mg by mouth 2 times a day.     sodium bicarbonate 325 MG Tab  Take 2 Tabs by mouth 3 times a day.     vitamin D,  Ergocalciferol, (DRISDOL) 83108 UNITS Cap capsule  Take 50,000 Units by mouth every 7 days. On Fri     Zinc Oxide 11.3 % Cream  Apply 1 Application to affected area(s) 1 time daily as needed (for skin irritations to coccyx).     ziprasidone (GEODON) 80 MG Cap  Take 160 mg by mouth every evening.         ACTIVITY:  As tolerated.  She has a front wheel walker at home.  She was found   to be able to walk with minimal assistance by physical therapy and was able   to transfer without difficulty.    DIET:  As tolerated.    HISTORY OF PRESENT ILLNESS AND HOSPITAL COURSE:  This is a 26-year-old female   who came in with right-sided weakness and left lower extremity weakness as   well as right-sided numbness.  She had a questionable history of suspected   mitochondrial disorder; however, she has not had a biopsy done.  This was able   to be done at her stay here.  She was evaluated by neurology who felt that   she was started on steroids and this did not have significant amount of change   in her overall course; however, neurology felt that this was either myelitis   versus psychogenic.  Muscle biopsy was done and sent to an outside laboratory   for further evaluation.  It was not yet available at the time of the patient's   discharge.  She was evaluated by physical therapy and occupational therapy   and recommended for skilled nursing; however, several skilled nursing   facilities declined her.  With no accepting skilled nursing facility in the   area, the patient insisted to discharge to home.  She requested a hospital   bed; however, given her ambulation with minimal assistance, she did not   qualify.  She also requested home health, but given that she was ambulatory   she also did not qualify as she was able to leave the house with minimal   assistance.  She was advised if her condition were to change,   she is welcome to come back to the hospital or see the primary care physician   for further management.  She is also  advised to follow up with her neurologist   or outpatient physician for her muscle biopsy results.  She was therefore   able to be discharged home in good and stable condition with close outpatient   followup.    Total time of discharge was 39 minutes.       ____________________________________     MD DAVE Marte / CHRISTELLE    DD:  02/01/2017 14:12:05  DT:  02/02/2017 11:40:43    D#:  [id number]  Job#:  [id number]

## 2017-02-07 ENCOUNTER — TELEPHONE (OUTPATIENT)
Dept: NEUROLOGY | Facility: MEDICAL CENTER | Age: 27
End: 2017-02-07

## 2017-02-07 VITALS
RESPIRATION RATE: 24 BRPM | SYSTOLIC BLOOD PRESSURE: 106 MMHG | TEMPERATURE: 97.6 F | HEART RATE: 96 BPM | DIASTOLIC BLOOD PRESSURE: 60 MMHG

## 2017-02-07 SDOH — ECONOMIC STABILITY: HOUSING INSECURITY: UNSAFE APPLIANCES: 0

## 2017-02-07 SDOH — ECONOMIC STABILITY: HOUSING INSECURITY: UNSAFE COOKING RANGE AREA: 0

## 2017-02-07 ASSESSMENT — ACTIVITIES OF DAILY LIVING (ADL)
HOME_HEALTH_OASIS: 01
HOME_HEALTH_OASIS: 00
OASIS_M1830: 01

## 2017-02-07 ASSESSMENT — ENCOUNTER SYMPTOMS: POOR JUDGMENT: 1

## 2017-02-07 NOTE — TELEPHONE ENCOUNTER
Pt's grand mother is calling and asking for the Pathology results of the muscle Biopsy. Results are in Media. Please advise. Thank you. HU

## 2017-02-08 NOTE — OP REPORT
DATE OF SERVICE:  01/26/2017    PREOPERATIVE DIAGNOSIS:  Diffuse myopathy.    POSTOPERATIVE DIAGNOSIS:  Diffuse myopathy.    OPERATIONS PERFORMED:  1.  Quadriceps (vastus intermedius) excisional muscle biopsy (harvested 1 inch   x 1 cm muscle).  2.  Complex closure of 6 cm wound in multiple layers.    SURGEON:  Isidro Vigil MD    ANESTHESIA:  General.    INDICATIONS:  Diffuse myopathy with lower extremity and upper extremity   weakness.  Procedure, alternatives, risks and disability were discussed with   the patient including bleeding, infection, and nondiagnostic biopsy.    Questions were answered.  She wished to proceed.    DESCRIPTION OF OPERATION:  The right thigh was prepped and draped in sterile   fashion.  A longitudinal incision was made.  Subcutaneous tissues were divided   with cautery.  The fascia was then incised sharply and then the muscle belly   dissected using a Thomas clamp and then exposed using an [employer] retractor.  This   provided enough exposure to divide the muscle sharply.  Hemostasis was   obtained using 3-0 Vicryl sutures.  The specimen was handed directly to   pathology for processing and transport to reference labs.  The wound was   irrigated.  There was no active hemorrhage at the time of closure.  Fascia was   closed using interrupted 3-0 Vicryl.  Subcutaneous tissues were closed using   interrupted 3-0 Vicryl.  Skin was closed using running 4-0 Monocryl   subcuticular suture.  Patient tolerated the procedure well, was taken to   recovery room in stable condition.       ____________________________________     MD MINDA TOLENTINO / NTS    DD:  02/08/2017 11:05:06  DT:  02/08/2017 12:30:55    D#:  [id number]  Job#:  [id number]

## 2017-02-09 ASSESSMENT — ENCOUNTER SYMPTOMS
DEPRESSION: 1
PALPITATIONS: 1

## 2017-02-09 NOTE — TELEPHONE ENCOUNTER
I looked at the biopsy results, including all the specialized staining techniques, there is no evidence of any type of inherent muscle disease, inflammatory muscle disease, or other acquired muscle disease. The only minimal changes seen were likely related to the steroids themselves. I am pleased about this result, but unfortunately, they don't give us an answer as to why she is having these episodes of fluctuating weakness that seemed to respond to steroids. I always encourage them to take these results with him and follow up with her neuromuscular specialist at [facility].

## 2017-02-09 NOTE — TELEPHONE ENCOUNTER
Called and spoke with pt's grandmother. All results were given. She verbally understood and will comply with all information given. HU

## 2017-02-12 ENCOUNTER — OFFICE VISIT (OUTPATIENT)
Dept: URGENT CARE | Facility: CLINIC | Age: 27
End: 2017-02-12
Payer: MEDICARE

## 2017-02-12 ENCOUNTER — HOSPITAL ENCOUNTER (OUTPATIENT)
Facility: MEDICAL CENTER | Age: 27
End: 2017-02-12
Attending: NURSE PRACTITIONER
Payer: MEDICARE

## 2017-02-12 VITALS
DIASTOLIC BLOOD PRESSURE: 90 MMHG | BODY MASS INDEX: 46.93 KG/M2 | OXYGEN SATURATION: 96 % | RESPIRATION RATE: 12 BRPM | HEART RATE: 97 BPM | SYSTOLIC BLOOD PRESSURE: 134 MMHG | WEIGHT: 255 LBS | HEIGHT: 62 IN | TEMPERATURE: 97.2 F

## 2017-02-12 DIAGNOSIS — Z93.59 CHRONIC SUPRAPUBIC CATHETER (HCC): ICD-10-CM

## 2017-02-12 DIAGNOSIS — R31.9 URINARY TRACT INFECTION WITH HEMATURIA, SITE UNSPECIFIED: ICD-10-CM

## 2017-02-12 DIAGNOSIS — R30.0 DYSURIA: ICD-10-CM

## 2017-02-12 DIAGNOSIS — N39.0 URINARY TRACT INFECTION WITH HEMATURIA, SITE UNSPECIFIED: ICD-10-CM

## 2017-02-12 LAB
APPEARANCE UR: NORMAL
BILIRUB UR STRIP-MCNC: NORMAL MG/DL
COLOR UR AUTO: NORMAL
GLUCOSE UR STRIP.AUTO-MCNC: NORMAL MG/DL
KETONES UR STRIP.AUTO-MCNC: NORMAL MG/DL
LEUKOCYTE ESTERASE UR QL STRIP.AUTO: NORMAL
NITRITE UR QL STRIP.AUTO: NORMAL
PH UR STRIP.AUTO: 6 [PH] (ref 5–8)
PROT UR QL STRIP: NORMAL MG/DL
RBC UR QL AUTO: NORMAL
SP GR UR STRIP.AUTO: 1.03
UROBILINOGEN UR STRIP-MCNC: NORMAL MG/DL

## 2017-02-12 PROCEDURE — 87086 URINE CULTURE/COLONY COUNT: CPT

## 2017-02-12 PROCEDURE — 99214 OFFICE O/P EST MOD 30 MIN: CPT | Performed by: NURSE PRACTITIONER

## 2017-02-12 PROCEDURE — G8482 FLU IMMUNIZE ORDER/ADMIN: HCPCS | Performed by: NURSE PRACTITIONER

## 2017-02-12 PROCEDURE — G8419 CALC BMI OUT NRM PARAM NOF/U: HCPCS | Performed by: NURSE PRACTITIONER

## 2017-02-12 PROCEDURE — 1036F TOBACCO NON-USER: CPT | Performed by: NURSE PRACTITIONER

## 2017-02-12 PROCEDURE — G8432 DEP SCR NOT DOC, RNG: HCPCS | Performed by: NURSE PRACTITIONER

## 2017-02-12 PROCEDURE — 1111F DSCHRG MED/CURRENT MED MERGE: CPT | Performed by: NURSE PRACTITIONER

## 2017-02-12 PROCEDURE — 81002 URINALYSIS NONAUTO W/O SCOPE: CPT | Performed by: NURSE PRACTITIONER

## 2017-02-12 RX ORDER — NITROFURANTOIN 25; 75 MG/1; MG/1
100 CAPSULE ORAL 2 TIMES DAILY
Qty: 14 CAP | Refills: 0 | Status: ON HOLD | OUTPATIENT
Start: 2017-02-12 | End: 2017-02-27

## 2017-02-12 ASSESSMENT — ENCOUNTER SYMPTOMS
VOMITING: 0
CHILLS: 0
NAUSEA: 1
FEVER: 0
MYALGIAS: 0
FLANK PAIN: 1

## 2017-02-12 ASSESSMENT — PAIN SCALES - GENERAL: PAINLEVEL: 8=MODERATE-SEVERE PAIN

## 2017-02-12 NOTE — MR AVS SNAPSHOT
"        Kristin Armstrong Balderrama   2017 10:00 AM   Office Visit   MRN: [id number]    Department:  [facility]   Dept Phone:  [phone]    Description:  Female : 1990   Provider:  AXEL Bowers           Reason for Visit     Dysuria x 5 days / Burning sensation / frequent urination /       Allergies as of 2017     Allergen Noted Reactions    Cefdinir 2016   Shortness of Breath, Itching    Depakote [Divalproex Sodium] 2010   Unspecified    Muscle spasms/muscle pain and weakness      Abilify 2013   Unspecified    Headaches/muscle twitching      Amitriptyline 10/31/2013   Unspecified    Headaches      Amoxicillin 2010   Rash    Pt states \"I get a rash\".      Ciprofloxacin 2009   Rash    Pt states \"I get a rash\".      Clindamycin 2011   Nausea    Even with food      Doxycycline 08/15/2012   Vomiting, Nausea    RXN=unknown    Ees [Erythromycin] 2010   Vomiting, Nausea    Flagyl [Metronidazole Hcl] 2011   Unspecified    \"eye problems\"      Flomax [Tamsulosin Hydrochloride] 2009   Swelling    Metformin 2013   Unspecified    Increased lactic acid       Sulfa Drugs 2010   Hives, Rash    RXN=since childhood    Tape 08/15/2012   Rash    Tears skin off   coban with Tegaderm tape ok  RXN=ongoing    Vancomycin 07/10/2016   Itching    Pt becomes flushed in face and chest.   RXN=7/10/16    Cephalexin [Keflex] 2017   Rash    Pt states she gets a rash with this medication    Levofloxacin 10/27/2016   Unspecified    Leg muscle cramps      You were diagnosed with     Dysuria   [788.1.ICD-9-CM]       Urinary tract infection with hematuria, site unspecified   [5966128]       Chronic suprapubic catheter (CMS-[facility])   [992554]         Vital Signs     Blood Pressure Pulse Temperature Respirations Height Weight    134/90 mmHg 97 36.2 °C (97.2 °F) 12 1.575 m (5' 2.01\") 115.667 kg (255 lb)    Body Mass Index Oxygen Saturation Last Menstrual " Period Smoking Status          46.63 kg/m2 96% 06/22/2016 Passive Smoke Exposure - Never Smoker        Basic Information     Date Of Birth Sex Race Ethnicity Preferred Language    1990 Female White Non- English      Your appointments     Feb 16, 2017  8:00 AM   PT New Evaluation 60 Minutes with JACQUELYN Soliz Physical Therapy [facility] ([address])    [address] [place] [address] [address]  [place] [address]   [phone]           Please bring Photo ID, Insurance Cards, list of all Medication and copies of any legal documents (such as Living Will, Power of ) If speaking a language besides English please bring an adult . Please arrive 30 minutes prior for check in and registration.            Feb 21, 2017  8:30 AM   PT Follow Up 30 Minutes with JACQUELYN Soliz Physical Therapy [facility] ([address])    [address] [place] [address] [address]  [place] [address]   [phone]            Feb 23, 2017  8:30 AM   PT Follow Up 30 Minutes with JACQUELYN Soliz Physical Therapy [facility] ([address])    [address] [place] [address] [address]  [place] [address]   [phone]            Feb 24, 2017  7:30 AM   Adult Draw/Collection with LAB [facility]   [facility] (--)    [address]  [place] [address]   [phone]            Feb 24, 2017  9:40 AM   Established Patient with Torres Brody M.D.   [facility] [address]    [address]  [facility] [address]  [place] [address]   [phone]           You will be receiving a confirmation call a few days before your appointment from our automated call confirmation system.            Feb 28, 2017  8:30 AM   PT Follow Up 30 Minutes with JACQUELYN Soliz Physical Therapy [facility] ([address])    [facility] [address]  [place] [address]   [phone]            Mar 01, 2017  9:00 AM   Individual Therapy with Blanca Brantley L.C.S.W.   [facility])    [address]  [facility]  [address]  [place] [address]   [phone]            Mar 01, 2017 12:15 PM   ECHO with ECHO [facility] EXAM 12   [facility]ARDIOLOGY [facility] ([facility])    [address] [facility]  [place] [address]   [phone]           No prep            Mar 02, 2017  9:00 AM   PT Follow Up 30 Minutes with APARNA SolizT.   [facility] ([address])    [address]  [address]  [place] [address]   [phone]            Mar 07, 2017  9:00 AM   PT Follow Up 30 Minutes with CLIVE Soliz.TEffie   [facility] ([address] [place] [address])    [address]  [address]  [place] [address]   [phone]            Mar 09, 2017  8:30 AM   PT Follow Up 30 Minutes with CLIVE Soliz.TEffie   [facility] ([address] [place] [address])    [address]  [place] [address]   [phone]            Mar 14, 2017  9:15 AM   PT Follow Up 30 Minutes with Bharati Wong P.T.   [facility] ([address] [place] [address])    [address]  [address]  [place] [address]   [phone]            Mar 15, 2017  9:00 AM   Individual Therapy with Blanca Brantley L.C.S.W.   [facility])    [address] [facility]  [address]  [place] [address]   [phone]            Mar 17, 2017  8:30 AM   Follow Up Med Management with Ronny Burnette M.D.   BE[facility]AVIORAL [facility] [address] [facility] ([facility])    [address] [facility]  [address]  [place] [address]   [phone]            Mar 29, 2017  9:00 AM   Individual Therapy with Joanne Fontana, L.C.S.W. [facility])    [address] [facility]  [address]  [place] [address]   [phone]            Apr 12, 2017  9:00 AM   Individual Therapy with Blanca Brantley L.C.S.W.   [facility])    [address] [facility]  [address]  [place] [address]   [phone]            Jul 19, 2017  8:40 AM   FOLLOW UP with Torres Kumar M.D.   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 Low  9/18/2010 - Present    Neurogenic bladder N31.9 Low  4/2/2011 - Present    Sinus tachycardia (Chronic) R00.0 High  10/31/2013 - Present    Knee pain, right M25.561 Low  2/13/2014 - Present    Anxiety F41.9 Low  12/16/2014 - Present    Fatty liver disease, nonalcoholic K76.0 Low  1/19/2015 - Present    Progressive focal motor weakness M62.81 Low  6/28/2015 - Present    Chronic back pain M54.9, G89.29 Low  6/29/2015 - Present    Hypothyroidism (Chronic) E03.9 Low  11/23/2015 - Present    PCOS (polycystic ovarian syndrome) E28.2 Low  11/23/2015 - Present    H/O prior ablation treatment Z98.890   2/10/2016 - Present    Peripheral neuropathy (CMS-HCC) (Chronic) G62.9   3/6/2016 - Present    TONYA on CPAP G47.33 Low  3/7/2016 - Present    Morbidly obese (CMS-HCC) E66.01   3/7/2016 - Present    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   3/7/2016 - Present    Scoliosis M41.9   3/7/2016 - Present    GERD (gastroesophageal reflux disease) (Chronic) K21.9   3/7/2016 - Present    Vitamin D deficiency E55.9   5/21/2016 - Present    Chronic inflammatory arthritis (Chronic) M19.90 Medium  5/23/2016 - Present    Right flank pain R10.9   6/6/2016 - Present    Weakness of both lower extremities M62.81   6/22/2016 - Present    Elevated sedimentation rate R70.0   6/27/2016 - Present    Galactorrhea O92.6   7/22/2016 - Present    Psychosis, schizophrenia, simple (HCC) (Chronic) F20.89 Low Chronic 9/29/2016 - Present    Schizophrenia (CMS-HCC) F20.9 Low  10/27/2016 - Present    Paralysis (CMS-HCC) G83.9 High  10/27/2016 - Present    Chronic pain syndrome (Chronic) G89.4 Medium  10/27/2016 - Present    Suprapubic catheter (CMS-HCC) (Chronic) Z93.59 Low  10/27/2016 - Present    Bowel and bladder incontinence R32, R15.9    "10/27/2016 - Present    Depression F32.9 Low  10/28/2016 - Present    HTN (hypertension) (Chronic) I10 Medium  11/1/2016 - Present    Quadriparesis (CMS-HCC) G82.50 High  11/1/2016 - Present    Hypovitaminosis D E55.9   11/29/2016 - Present    Leg weakness M62.81   1/4/2017 - Present    Weakness R53.1   1/22/2017 - Present    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 High  1/24/2017 - Present      Health Maintenance        Date Due Completion Dates    IMM HEP A VACCINE (1 of 2 - Standard Series) 10/13/1990 ---    IMM VARICELLA (CHICKENPOX) VACCINE (1 of 2 - 2 Dose Adolescent Series) 10/13/2002 ---    PAP SMEAR 7/22/2019 7/22/2016 (Postponed), 2/19/2013 (N/S)    Override on 7/22/2016: Postponed (per pt was told could \"skip\" 2016)    Override on 2/19/2013: (N/S) ([facility])    IMM DTaP/Tdap/Td Vaccine (7 - Td) 8/6/2022 8/6/2012, 9/18/2010, 3/3/1994, 5/17/1991, 5/10/1990, 3/23/1990, 1990    COLONOSCOPY 9/25/2023 9/25/2013            Results     POCT Urinalysis      Component Value Standard Range & Units    POC Color orange Negative    POC Appearance cloudy Negative    POC Leukocyte Esterase trace Negative    POC Nitrites Neg Negative    POC Urobiligen Neg Negative (0.2) mg/dL    POC Protein Neg Negative mg/dL    POC Urine PH 6.0 5.0 - 8.0    POC Blood Trace Negative    POC Specific Gravity 1.030 <1.005 - >1.030    POC Ketones Trace Negative mg/dL    POC Biliruben Neg Negative mg/dL    POC Glucose Neg Negative mg/dL                        Current Immunizations     Dtap Vaccine 3/3/1994, 5/17/1991, 5/10/1990, 3/23/1990, 1990    HIB Vaccine(PEDVAX) 1/11/1991    HPV Quadrivalent Vaccine (GARDASIL) 10/27/2011, 5/27/2011, 3/1/2011    Hepatitis B Vaccine Non-Recombivax (Ped/Adol) 1/28/2004, 10/28/2003, 10/29/1999    Influenza TIV (IM) 9/5/2013, 12/7/2011, 11/7/2011    Influenza Vaccine Adult HD 10/5/2016    MMR Vaccine 3/3/1994, 1/11/1991    OPV - Historical Data 3/3/1994, 5/17/1991, 5/10/1990, 3/23/1990, " 1990    Pneumococcal Vaccine (PCV7) Historical Data 11/8/2015    Pneumococcal polysaccharide vaccine (PPSV-23) 1/23/2017  5:35 PM    TD Vaccine 9/18/2010  4:45 PM    Tdap Vaccine 8/6/2012      Below and/or attached are the medications your provider expects you to take. Review all of your home medications and newly ordered medications with your provider and/or pharmacist. Follow medication instructions as directed by your provider and/or pharmacist. Please keep your medication list with you and share with your provider. Update the information when medications are discontinued, doses are changed, or new medications (including over-the-counter products) are added; and carry medication information at all times in the event of emergency situations     Allergies:  CEFDINIR - Shortness of Breath,Itching     DEPAKOTE - Unspecified     ABILIFY - Unspecified     AMITRIPTYLINE - Unspecified     AMOXICILLIN - Rash     CIPROFLOXACIN - Rash     CLINDAMYCIN - Nausea     DOXYCYCLINE - Vomiting,Nausea     EES - Vomiting,Nausea     FLAGYL - Unspecified     FLOMAX - Swelling     METFORMIN - Unspecified     SULFA DRUGS - Hives,Rash     TAPE - Rash     VANCOMYCIN - Itching     CEPHALEXIN - Rash     LEVOFLOXACIN - Unspecified               Medications  Valid as of: February 12, 2017 - 10:37 AM    Generic Name Brand Name Tablet Size Instructions for use    Albuterol Sulfate (Aero Soln) albuterol 108 (90 BASE) MCG/ACT Inhale 2 Puffs by mouth every 6 hours as needed for Shortness of Breath.        Cyanocobalamin (Tab) vitamin b12 500 MCG Take 1,000 mcg by mouth 2 times a day.        Cyclobenzaprine HCl (Tab) FLEXERIL 10 MG Take 10 mg by mouth 2 Times a Day. Indications: Muscle Spasm        Ergocalciferol (Cap) DRISDOL 56472 UNITS Take 50,000 Units by mouth every 7 days. On Fri        FLUoxetine HCl (Cap) PROZAC 10 MG Take 1 Cap by mouth every day.        Ivabradine HCl (Tab) CORLANOR 5 MG Take 1 Tab by mouth 2 Times a Day.         Lactulose (Solution) lactulose 10 GM/15ML Take 10 g by mouth 2 times a day.        Levothyroxine Sodium (Tab) SYNTHROID 75 MCG Take 75 mcg by mouth Every morning on an empty stomach.        Naproxen (Tab) NAPROSYN 500 MG Take 500 mg by mouth 2 Times a Day.        Nitrofurantoin Monohyd Macro (Cap) MACROBID 100 MG Take 1 Cap by mouth 2 times a day.        Omeprazole (CAPSULE DELAYED RELEASE) PRILOSEC 20 MG Take 20 mg by mouth 2 times a day.        Oxybutynin Chloride (TABLET SR 24 HR) DITROPAN-XL 10 MG Take 10 mg by mouth 2 Times a Day.        Oxycodone-Acetaminophen (Tab) PERCOCET-10  MG Take 2 Tabs by mouth 2 Times a Day. Two tabs  Indications: Pain        Promethazine HCl (Tab) PHENERGAN 25 MG Take 1 Tab by mouth every 6 hours as needed for Nausea/Vomiting.        RaNITidine HCl (Tab) ZANTAC 150 MG Take 150 mg by mouth 2 times a day.        Sodium Bicarbonate (Tab) sodium bicarbonate 325 MG Take 2 Tabs by mouth 3 times a day.        Zinc Oxide (Cream) Zinc Oxide 11.3 % Apply 1 Application to affected area(s) 1 time daily as needed (for skin irritations to coccyx).        Ziprasidone HCl (Cap) GEODON 80 MG Take 160 mg by mouth every evening.        .                 Medicines prescribed today were sent to:     [id number] - [place], [place] - [address] [address]    Phone: [phone] Fax: [phone]    Open 24 Hours?: No      Medication refill instructions:       If your prescription bottle indicates you have medication refills left, it is not necessary to call your provider’s office. Please contact your pharmacy and they will refill your medication.    If your prescription bottle indicates you do not have any refills left, you may request refills at any time through one of the following ways: The online [employer] system (except Urgent Care), by calling your provider’s office, or by asking your pharmacy to contact your provider’s office with a refill request.  Medication refills are processed only during regular business hours and may not be available until the next business day. Your provider may request additional information or to have a follow-up visit with you prior to refilling your medication.   *Please Note: Medication refills are assigned a new Rx number when refilled electronically. Your pharmacy may indicate that no refills were authorized even though a new prescription for the same medication is available at the pharmacy. Please request the medicine by name with the pharmacy before contacting your provider for a refill.        Your To Do List     Future Labs/Procedures Complete By Expires    URINE CULTURE(NEW)  As directed 2/12/2018      Other Notes About Your Plan     DME:  [facility] / ph [phone] / fax [phone]              MyChart Access Code: Activation code not generated  Current MyChart Status: Active

## 2017-02-12 NOTE — PROGRESS NOTES
"Subjective:      Kristin Balderrama is a 26 y.o. female who presents with Dysuria            Dysuria   This is a new problem. The current episode started in the past 7 days. The problem occurs every urination. The problem has been unchanged. The quality of the pain is described as aching. Associated symptoms include flank pain, hematuria and nausea. Pertinent negatives include no chills or vomiting. Her past medical history is significant for catheterization and recurrent UTIs.   taking phenergan for nausea.      Review of Systems   Constitutional: Negative for fever and chills.   Gastrointestinal: Positive for nausea. Negative for vomiting.   Genitourinary: Positive for dysuria, hematuria and flank pain.   Musculoskeletal: Negative for myalgias.          Objective:     /90 mmHg  Pulse 97  Temp(Src) 36.2 °C (97.2 °F)  Resp 12  Ht 1.575 m (5' 2.01\")  Wt 115.667 kg (255 lb)  BMI 46.63 kg/m2  SpO2 96%  LMP 06/22/2016     Physical Exam   Constitutional: She is oriented to person, place, and time. She appears well-developed and well-nourished. No distress.   Cardiovascular: Normal rate, regular rhythm and normal heart sounds.    No murmur heard.  Pulmonary/Chest: Effort normal and breath sounds normal. No respiratory distress.   Abdominal: Soft. Bowel sounds are normal. There is tenderness in the suprapubic area. There is CVA tenderness.   Musculoskeletal: Normal range of motion.   Neurological: She is alert and oriented to person, place, and time.   Skin: Skin is warm and dry.   Psychiatric: She has a normal mood and affect. Her behavior is normal. Thought content normal.   Vitals reviewed.              Assessment/Plan:     1. Dysuria  POCT Urinalysis    URINE CULTURE(NEW)   2. Urinary tract infection with hematuria, site unspecified  nitrofurantoin monohydr macro (MACROBID) 100 MG Cap   3. Chronic suprapubic catheter (CMS-HCC)       Differential diagnosis, natural history, supportive care, and " indications for immediate follow-up discussed at length.

## 2017-02-13 ENCOUNTER — TELEPHONE (OUTPATIENT)
Dept: NEUROLOGY | Facility: MEDICAL CENTER | Age: 27
End: 2017-02-13

## 2017-02-13 DIAGNOSIS — G82.50 QUADRIPARESIS (HCC): ICD-10-CM

## 2017-02-13 NOTE — TELEPHONE ENCOUNTER
Pt's grandmother is calling and asking if you could please place the referral to [facility] they are willing to go down there now. Please advise. Thank you. HU

## 2017-02-14 LAB
BACTERIA UR CULT: NORMAL
SIGNIFICANT IND 70042: NORMAL
SOURCE SOURCE: NORMAL

## 2017-02-16 ENCOUNTER — HOSPITAL ENCOUNTER (OUTPATIENT)
Dept: RADIOLOGY | Facility: MEDICAL CENTER | Age: 27
End: 2017-02-16
Attending: NURSE PRACTITIONER
Payer: MEDICARE

## 2017-02-16 ENCOUNTER — APPOINTMENT (OUTPATIENT)
Dept: PHYSICAL THERAPY | Facility: REHABILITATION | Age: 27
End: 2017-02-16
Attending: INTERNAL MEDICINE
Payer: MEDICARE

## 2017-02-16 ENCOUNTER — OFFICE VISIT (OUTPATIENT)
Dept: MEDICAL GROUP | Facility: CLINIC | Age: 27
End: 2017-02-16
Payer: MEDICARE

## 2017-02-16 ENCOUNTER — TELEPHONE (OUTPATIENT)
Dept: MEDICAL GROUP | Facility: CLINIC | Age: 27
End: 2017-02-16

## 2017-02-16 VITALS
SYSTOLIC BLOOD PRESSURE: 134 MMHG | TEMPERATURE: 96.9 F | DIASTOLIC BLOOD PRESSURE: 84 MMHG | HEART RATE: 101 BPM | RESPIRATION RATE: 20 BRPM | OXYGEN SATURATION: 94 %

## 2017-02-16 DIAGNOSIS — R10.9 RIGHT FLANK PAIN: ICD-10-CM

## 2017-02-16 DIAGNOSIS — Z93.59 CHRONIC SUPRAPUBIC CATHETER (HCC): ICD-10-CM

## 2017-02-16 DIAGNOSIS — N39.0 URINARY TRACT INFECTION ASSOCIATED WITH CATHETERIZATION OF URINARY TRACT, UNSPECIFIED INDWELLING URINARY CATHETER TYPE, SUBSEQUENT ENCOUNTER: ICD-10-CM

## 2017-02-16 DIAGNOSIS — Z87.442 HISTORY OF KIDNEY STONES: ICD-10-CM

## 2017-02-16 DIAGNOSIS — R10.2 PELVIC PAIN: ICD-10-CM

## 2017-02-16 DIAGNOSIS — N31.9 NEUROGENIC BLADDER: ICD-10-CM

## 2017-02-16 DIAGNOSIS — T83.511D URINARY TRACT INFECTION ASSOCIATED WITH CATHETERIZATION OF URINARY TRACT, UNSPECIFIED INDWELLING URINARY CATHETER TYPE, SUBSEQUENT ENCOUNTER: ICD-10-CM

## 2017-02-16 PROCEDURE — 1111F DSCHRG MED/CURRENT MED MERGE: CPT | Performed by: NURSE PRACTITIONER

## 2017-02-16 PROCEDURE — 76775 US EXAM ABDO BACK WALL LIM: CPT

## 2017-02-16 PROCEDURE — G8432 DEP SCR NOT DOC, RNG: HCPCS | Performed by: NURSE PRACTITIONER

## 2017-02-16 PROCEDURE — 1036F TOBACCO NON-USER: CPT | Performed by: NURSE PRACTITIONER

## 2017-02-16 PROCEDURE — 99214 OFFICE O/P EST MOD 30 MIN: CPT | Performed by: NURSE PRACTITIONER

## 2017-02-16 PROCEDURE — G8419 CALC BMI OUT NRM PARAM NOF/U: HCPCS | Performed by: NURSE PRACTITIONER

## 2017-02-16 PROCEDURE — G8482 FLU IMMUNIZE ORDER/ADMIN: HCPCS | Performed by: NURSE PRACTITIONER

## 2017-02-16 RX ORDER — FENTANYL 25 UG/1
1 PATCH TRANSDERMAL
COMMUNITY
End: 2017-11-30

## 2017-02-16 NOTE — PROGRESS NOTES
CC: Dysuria        HPI:     Kristin presents today for the followin. Right flank pain/History of kidney stones/Neurogenic bladder/ Chronic suprapubic catheter (CMS-[facility])/ Pelvic pain/ Urinary tract infection associated with catheterization of urinary tract, unspecified indwelling urinary catheter type, subsequent encounter (CMS-HCC)  This patient of Dr. Brody here today complaining of some pelvic pain and some right flank pain. She was treated a few days ago in urgent care for urinary tract infection and given Macrobid. She is concerned today that that was not the correct antibiotic she doesn't feel that her symptoms are improving. She states that she has a temperature at home because between . She's had some associated nausea.  Urine culture that was done 4 days ago with the compatible with Macrobid.  She's had several cultures done recently. She was hospitalized last month.  She had a suprapubic catheter placed last year [facility].  This is changed monthly with urology in [facility]  She complains of pain around this area  She does have home care providers    She is also followed by nephrology in [facility].    She tells me she's been passing kidney stones and sees them in her dependent wears.    Current Outpatient Prescriptions   Medication Sig Dispense Refill   • fentanyl (DURAGESIC) 25 MCG/HR PATCH 72 HR Apply 1 Patch to skin as directed every 72 hours.     • nitrofurantoin monohydr macro (MACROBID) 100 MG Cap Take 1 Cap by mouth 2 times a day. 14 Cap 0   • lactulose 10 GM/15ML Solution Take 10 g by mouth 2 times a day.     • vitamin D, Ergocalciferol, (DRISDOL) 03478 UNITS Cap capsule Take 50,000 Units by mouth every 7 days. On Fri     • oxybutynin SR (DITROPAN-XL) 10 MG CR tablet Take 10 mg by mouth 2 Times a Day.     • cyclobenzaprine (FLEXERIL) 10 MG Tab Take 10 mg by mouth 2 Times a Day. Indications: Muscle Spasm     • fluoxetine (PROZAC) 10 MG Cap Take 1 Cap by mouth every day. 30 Cap 1   •  oxycodone-acetaminophen (PERCOCET-10)  MG Tab Take 2 Tabs by mouth 2 Times a Day. Two tabs  Indications: Pain     • promethazine (PHENERGAN) 25 MG Tab Take 1 Tab by mouth every 6 hours as needed for Nausea/Vomiting. 30 Tab 0   • naproxen (NAPROSYN) 500 MG Tab Take 500 mg by mouth 2 Times a Day.     • albuterol 108 (90 BASE) MCG/ACT Aero Soln inhalation aerosol Inhale 2 Puffs by mouth every 6 hours as needed for Shortness of Breath.     • cyanocobalamin (HM VITAMIN B12) 500 MCG tablet Take 1,000 mcg by mouth 2 times a day.     • Zinc Oxide 11.3 % Cream Apply 1 Application to affected area(s) 1 time daily as needed (for skin irritations to coccyx).     • sodium bicarbonate 325 MG Tab Take 2 Tabs by mouth 3 times a day.     • ziprasidone (GEODON) 80 MG Cap Take 160 mg by mouth every evening.     • levothyroxine (SYNTHROID) 75 MCG Tab Take 75 mcg by mouth Every morning on an empty stomach.     • Ivabradine HCl (CORLANOR) 5 MG Tab Take 1 Tab by mouth 2 Times a Day. 60 Tab 6   • omeprazole (PRILOSEC) 20 MG delayed-release capsule Take 20 mg by mouth 2 times a day.     • ranitidine (ZANTAC) 150 MG Tab Take 150 mg by mouth 2 times a day.       No current facility-administered medications for this visit.     Social History   Substance Use Topics   • Smoking status: Passive Smoke Exposure - Never Smoker     Types: Cigarettes   • Smokeless tobacco: Never Used   • Alcohol Use: No     I reviewed patients allergies, problem list and medications today in [facility].    ROS: Any/all pertinent positives listed in the HPI, otherwise all others reviewed are negative today.      /84 mmHg  Pulse 101  Temp(Src) 36.1 °C (96.9 °F)  Resp 20  Wt   SpO2 94%  LMP 07/16/2016  Breastfeeding? No    Exam:   Gen: Alert and oriented, No apparent distress. WDWN  Psych: A+Ox3, normal affect and mood  Skin: Warm, dry and intact. Good turgor   No rashes in visible areas.  Eye: Conjunctiva clear, lids normal  ENMT: Lips without lesions, good  dentition   Oropharynx clear.   Lungs: Clear to auscultation bilaterally, no rales or rhonchi   Unlabored respiratory effort.   CV: Regular rate and rhythm, S1, S2. No murmurs.   No Edema  Abd: Soft, non distended. Normal active bowel sounds.  Has tenderness in the suprapubic area   No Hepatosplenomegaly, No pulsatile masses.   Ext: No clubbing, cyanosis, edema.   Does have some right sided CVA tenderness.    Assessment and Plan.   26 y.o. female with the following issues.    1. Right flank pain/History of kidney stones/ Neurogenic bladder/Chronic suprapubic catheter (CMS-HCC)/Pelvic pain/ Urinary tract infection associated with catheterization of urinary tract, unspecified indwelling urinary catheter type, subsequent encounter (CMS-HCC)  Stable. Will not change patient's antibiotic given its compatible with her culture in addition she has has numerous antibiotic allergies. Do recommend she continues close follow-up with urology. We'll do an ultrasound given some possible CVA tenderness. She is afebrile currently.  - US-RENAL; Future

## 2017-02-16 NOTE — MR AVS SNAPSHOT
"        Kristin Balderrama   2017 11:20 AM   Office Visit   MRN: [id number]    Department:  [facility]   Dept Phone:  [phone]    Description:  Female : 1990   Provider:  VANIA Blake           Reason for Visit     Dysuria Was dx with UTI  in . Rx Macrobid, but getting worse per pt. Tempture jumps from 101 and then drops to 95f.      Allergies as of 2017     Allergen Noted Reactions    Cefdinir 2016   Shortness of Breath, Itching    Depakote [Divalproex Sodium] 2010   Unspecified    Muscle spasms/muscle pain and weakness      Abilify 2013   Unspecified    Headaches/muscle twitching      Amitriptyline 10/31/2013   Unspecified    Headaches      Amoxicillin 2010   Rash    Pt states \"I get a rash\".      Ciprofloxacin 2009   Rash    Pt states \"I get a rash\".      Clindamycin 2011   Nausea    Even with food      Doxycycline 08/15/2012   Vomiting, Nausea    RXN=unknown    Ees [Erythromycin] 2010   Vomiting, Nausea    Flagyl [Metronidazole Hcl] 2011   Unspecified    \"eye problems\"      Flomax [Tamsulosin Hydrochloride] 2009   Swelling    Metformin 2013   Unspecified    Increased lactic acid       Sulfa Drugs 2010   Hives, Rash    RXN=since childhood    Tape 08/15/2012   Rash    Tears skin off   coban with Tegaderm tape ok  RXN=ongoing    Vancomycin 07/10/2016   Itching    Pt becomes flushed in face and chest.   RXN=7/10/16    Cephalexin [Keflex] 2017   Rash    Pt states she gets a rash with this medication    Levofloxacin 10/27/2016   Unspecified    Leg muscle cramps      You were diagnosed with     Right flank pain   [[id number]]         Vital Signs     Blood Pressure Pulse Temperature Respirations Oxygen Saturation       134/84 mmHg 101 36.1 °C (96.9 °F) 20 94%     Last Menstrual Period Breastfeeding? Smoking Status             2016 No Passive Smoke Exposure - Never Smoker         Basic Information "     Date Of Birth Sex Race Ethnicity Preferred Language    1990 Female White Non- English      Your appointments     Feb 16, 2017  1:00 PM   US BODY 30 with RK COOPER US 2   [facility] [place]    [facility] [place]  [address]  [place] [address]   [phone]            Feb 20, 2017  8:30 AM   PT New Evaluation 60 Minutes [facility] ([address] [place] [address])    [address] [facility]  [address]  [place] [address]   [phone]           Please bring Photo ID, Insurance Cards, list of all Medication and copies of any legal documents (such as Living Will, Power of ) If speaking a language besides English please bring an adult . Please arrive 30 minutes prior for check in and registration.            Feb 22, 2017  9:30 AM   PT Follow Up 30 Minutes with JACQUELYN [facility] ([address])    [address] [facility]  [address]  [place] [address]   [phone]            Feb 24, 2017  7:30 AM   Adult Draw/Collection with [facility]   [facility] (--)    [address]  [place] [address]   [phone]            Feb 24, 2017  9:40 AM   Established Patient with Torres Brody M.D.   [facility] [address]    [address]  [address]  [facility] [address] [phone]           You will be receiving a confirmation call a few days before your appointment from our automated call confirmation system.            Feb 27, 2017  8:00 AM   PT Follow Up 30 Minutes with JACQUELYN Turk [facility] ([address] [place] [address])    [address] [facility]  [address]  [place] [address]   [phone]            Mar 01, 2017  9:00 AM   Individual Therapy with Blanca Brantley L.C.S.W.   [facility]    [address] [facility]  [address]  [place] [address] [phone]            Mar 01, 2017 10:30 AM   PT Follow Up 30 Minutes  with JACQUELYN [facility] [place] ([address])    [address]  [address]  [place] [address]   [phone]            Mar 01, 2017 12:15 PM   ECHO with ECHO [facility] EXAM 12   ECHOCARDIOLOGY [facility] ([facility])    [address] [facility]  [place] [address]   [phone]           No prep            Mar 06, 2017 10:30 AM   PT Follow Up 30 Minutes with JACQUELYN Turk [facility] [place] ([address])    [address]  [address]  [place] [address]   [phone]            Mar 08, 2017 10:00 AM   PT Follow Up 30 Minutes with JACQUELYN [facility] [place] ([address])    [address] [facility]  [address]  [place] [address]   [phone]            Mar 15, 2017  9:00 AM   Individual Therapy with Blanca Brantley L.C.S.W.   [facility] ([facility]    [address] [facility]  [address]  [place] [address]   [phone]            Mar 17, 2017  8:30 AM   Follow Up Med Management with Ronny Burnette M.D.   [facility] [address] [place] [address] ([facility])    [address] [facility]  [address]  [place] [address]   [phone]            Mar 29, 2017  9:00 AM   Individual Therapy with Blanca Brantley L.C.S.W.   [facility] [place]    [address] [facility]  [address]  [place] [address]   [phone]            Apr 12, 2017  9:00 AM   Individual Therapy with Blanca Brantley L.C.S.W.   [facility] [place])    [address] [facility]  [address]  [place] [address]   [phone]            Jul 19, 2017  8:40 AM   FOLLOW UP with Torres Kumar M.D.   [facility] (--)    [address]  [place] [address]   [phone]              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 Low  9/18/2010 - Present    Neurogenic bladder N31.9 Low  4/2/2011 - Present    Sinus tachycardia  (Chronic) R00.0 High  10/31/2013 - Present    Knee pain, right M25.561 Low  2/13/2014 - Present    Anxiety F41.9 Low  12/16/2014 - Present    Fatty liver disease, nonalcoholic K76.0 Low  1/19/2015 - Present    Progressive focal motor weakness M62.81 Low  6/28/2015 - Present    Chronic back pain M54.9, G89.29 Low  6/29/2015 - Present    Hypothyroidism (Chronic) E03.9 Low  11/23/2015 - Present    PCOS (polycystic ovarian syndrome) E28.2 Low  11/23/2015 - Present    H/O prior ablation treatment Z98.890   2/10/2016 - Present    Peripheral neuropathy (CMS-HCC) (Chronic) G62.9   3/6/2016 - Present    TONYA on CPAP G47.33 Low  3/7/2016 - Present    Morbidly obese (CMS-HCC) E66.01   3/7/2016 - Present    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   3/7/2016 - Present    Scoliosis M41.9   3/7/2016 - Present    GERD (gastroesophageal reflux disease) (Chronic) K21.9   3/7/2016 - Present    Vitamin D deficiency E55.9   5/21/2016 - Present    Chronic inflammatory arthritis (Chronic) M19.90 Medium  5/23/2016 - Present    Right flank pain R10.9   6/6/2016 - Present    Weakness of both lower extremities M62.81   6/22/2016 - Present    Elevated sedimentation rate R70.0   6/27/2016 - Present    Galactorrhea O92.6   7/22/2016 - Present    Psychosis, schizophrenia, simple (HCC) (Chronic) F20.89 Low Chronic 9/29/2016 - Present    Schizophrenia (CMS-HCC) F20.9 Low  10/27/2016 - Present    Paralysis (CMS-HCC) G83.9 High  10/27/2016 - Present    Chronic pain syndrome (Chronic) G89.4 Medium  10/27/2016 - Present    Suprapubic catheter (CMS-HCC) (Chronic) Z93.59 Low  10/27/2016 - Present    Bowel and bladder incontinence R32, R15.9   10/27/2016 - Present    Depression F32.9 Low  10/28/2016 - Present    HTN (hypertension) (Chronic) I10 Medium  11/1/2016 - Present    Quadriparesis (CMS-HCC) G82.50 High  11/1/2016 - Present    Hypovitaminosis D E55.9   11/29/2016 - Present    Leg weakness M62.81   1/4/2017 - Present    Weakness R53.1   1/22/2017 -  "Present    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 High  1/24/2017 - Present      Health Maintenance        Date Due Completion Dates    IMM HEP A VACCINE (1 of 2 - Standard Series) 10/13/1990 ---    IMM VARICELLA (CHICKENPOX) VACCINE (1 of 2 - 2 Dose Adolescent Series) 10/13/2002 ---    PAP SMEAR 7/22/2019 7/22/2016 (Postponed), 2/19/2013 (N/S)    Override on 7/22/2016: Postponed (per pt was told could \"skip\" 2016)    Override on 2/19/2013: (N/S) ([facility])    IMM DTaP/Tdap/Td Vaccine (7 - Td) 8/6/2022 8/6/2012, 9/18/2010, 3/3/1994, 5/17/1991, 5/10/1990, 3/23/1990, 1990    COLONOSCOPY 9/25/2023 9/25/2013            Current Immunizations     Dtap Vaccine 3/3/1994, 5/17/1991, 5/10/1990, 3/23/1990, 1990    HIB Vaccine(PEDVAX) 1/11/1991    HPV Quadrivalent Vaccine (GARDASIL) 10/27/2011, 5/27/2011, 3/1/2011    Hepatitis B Vaccine Non-Recombivax (Ped/Adol) 1/28/2004, 10/28/2003, 10/29/1999    Influenza TIV (IM) 9/5/2013, 12/7/2011, 11/7/2011    Influenza Vaccine Adult HD 10/5/2016    MMR Vaccine 3/3/1994, 1/11/1991    OPV - Historical Data 3/3/1994, 5/17/1991, 5/10/1990, 3/23/1990, 1990    Pneumococcal Vaccine (PCV7) Historical Data 11/8/2015    Pneumococcal polysaccharide vaccine (PPSV-23) 1/23/2017  5:35 PM    TD Vaccine 9/18/2010  4:45 PM    Tdap Vaccine 8/6/2012      Below and/or attached are the medications your provider expects you to take. Review all of your home medications and newly ordered medications with your provider and/or pharmacist. Follow medication instructions as directed by your provider and/or pharmacist. Please keep your medication list with you and share with your provider. Update the information when medications are discontinued, doses are changed, or new medications (including over-the-counter products) are added; and carry medication information at all times in the event of emergency situations     Allergies:  CEFDINIR - Shortness of Breath,Itching     DEPAKOTE - " Unspecified     ABILIFY - Unspecified     AMITRIPTYLINE - Unspecified     AMOXICILLIN - Rash     CIPROFLOXACIN - Rash     CLINDAMYCIN - Nausea     DOXYCYCLINE - Vomiting,Nausea     EES - Vomiting,Nausea     FLAGYL - Unspecified     FLOMAX - Swelling     METFORMIN - Unspecified     SULFA DRUGS - Hives,Rash     TAPE - Rash     VANCOMYCIN - Itching     CEPHALEXIN - Rash     LEVOFLOXACIN - Unspecified               Medications  Valid as of: February 16, 2017 - 12:13 PM    Generic Name Brand Name Tablet Size Instructions for use    Albuterol Sulfate (Aero Soln) albuterol 108 (90 BASE) MCG/ACT Inhale 2 Puffs by mouth every 6 hours as needed for Shortness of Breath.        Cyanocobalamin (Tab) vitamin b12 500 MCG Take 1,000 mcg by mouth 2 times a day.        Cyclobenzaprine HCl (Tab) FLEXERIL 10 MG Take 10 mg by mouth 2 Times a Day. Indications: Muscle Spasm        Ergocalciferol (Cap) DRISDOL 15500 UNITS Take 50,000 Units by mouth every 7 days. On Fri        FentaNYL (PATCH 72 HR) DURAGESIC 25 MCG/HR Apply 1 Patch to skin as directed every 72 hours.        FLUoxetine HCl (Cap) PROZAC 10 MG Take 1 Cap by mouth every day.        Ivabradine HCl (Tab) CORLANOR 5 MG Take 1 Tab by mouth 2 Times a Day.        Lactulose (Solution) lactulose 10 GM/15ML Take 10 g by mouth 2 times a day.        Levothyroxine Sodium (Tab) SYNTHROID 75 MCG Take 75 mcg by mouth Every morning on an empty stomach.        Naproxen (Tab) NAPROSYN 500 MG Take 500 mg by mouth 2 Times a Day.        Nitrofurantoin Monohyd Macro (Cap) MACROBID 100 MG Take 1 Cap by mouth 2 times a day.        Omeprazole (CAPSULE DELAYED RELEASE) PRILOSEC 20 MG Take 20 mg by mouth 2 times a day.        Oxybutynin Chloride (TABLET SR 24 HR) DITROPAN-XL 10 MG Take 10 mg by mouth 2 Times a Day.        Oxycodone-Acetaminophen (Tab) PERCOCET-10  MG Take 2 Tabs by mouth 2 Times a Day. Two tabs  Indications: Pain        Promethazine HCl (Tab) PHENERGAN 25 MG Take 1 Tab by mouth  every 6 hours as needed for Nausea/Vomiting.        RaNITidine HCl (Tab) ZANTAC 150 MG Take 150 mg by mouth 2 times a day.        Sodium Bicarbonate (Tab) sodium bicarbonate 325 MG Take 2 Tabs by mouth 3 times a day.        Zinc Oxide (Cream) Zinc Oxide 11.3 % Apply 1 Application to affected area(s) 1 time daily as needed (for skin irritations to coccyx).        Ziprasidone HCl (Cap) GEODON 80 MG Take 160 mg by mouth every evening.        .                 Medicines prescribed today were sent to:     [id number] - [place], [place] - [address] [place] [address]    Phone: [phone] Fax: [phone]    Open 24 Hours?: No      Medication refill instructions:       If your prescription bottle indicates you have medication refills left, it is not necessary to call your provider’s office. Please contact your pharmacy and they will refill your medication.    If your prescription bottle indicates you do not have any refills left, you may request refills at any time through one of the following ways: The online [employer] system (except Urgent Care), by calling your provider’s office, or by asking your pharmacy to contact your provider’s office with a refill request. Medication refills are processed only during regular business hours and may not be available until the next business day. Your provider may request additional information or to have a follow-up visit with you prior to refilling your medication.   *Please Note: Medication refills are assigned a new Rx number when refilled electronically. Your pharmacy may indicate that no refills were authorized even though a new prescription for the same medication is available at the pharmacy. Please request the medicine by name with the pharmacy before contacting your provider for a refill.        Your To Do List     Future Labs/Procedures Complete By Expires    US-RENAL  As directed 8/19/2017      Other Notes About Your Plan     DME:  [facility] / ph [phone] / fax [phone]              MyChart Access Code: Activation code not generated  Current MyChart Status: Active

## 2017-02-16 NOTE — TELEPHONE ENCOUNTER
----- Message from VNAIA Blake sent at 2/16/2017  2:58 PM PST -----  Please call and let her Kidney ultrasound is normal.  No sign of infection, swelling or stone.

## 2017-02-20 ENCOUNTER — HOSPITAL ENCOUNTER (OUTPATIENT)
Dept: PHYSICAL THERAPY | Facility: REHABILITATION | Age: 27
End: 2017-02-20
Attending: INTERNAL MEDICINE
Payer: MEDICARE

## 2017-02-20 PROCEDURE — 97162 PT EVAL MOD COMPLEX 30 MIN: CPT

## 2017-02-20 PROCEDURE — G8981 BODY POS CURRENT STATUS: HCPCS | Mod: CM

## 2017-02-20 PROCEDURE — G8983 BODY POS D/C STATUS: HCPCS | Mod: CM

## 2017-02-20 PROCEDURE — G8982 BODY POS GOAL STATUS: HCPCS | Mod: CM

## 2017-02-21 ENCOUNTER — APPOINTMENT (OUTPATIENT)
Dept: PHYSICAL THERAPY | Facility: REHABILITATION | Age: 27
End: 2017-02-21
Attending: INTERNAL MEDICINE
Payer: MEDICARE

## 2017-02-21 RX ORDER — ZIPRASIDONE HYDROCHLORIDE 80 MG/1
CAPSULE ORAL
Qty: 60 CAP | Refills: 2 | Status: SHIPPED | OUTPATIENT
Start: 2017-02-21 | End: 2017-02-22

## 2017-02-22 ENCOUNTER — APPOINTMENT (OUTPATIENT)
Dept: RADIOLOGY | Facility: MEDICAL CENTER | Age: 27
DRG: 556 | End: 2017-02-22
Attending: EMERGENCY MEDICINE
Payer: MEDICARE

## 2017-02-22 ENCOUNTER — HOSPITAL ENCOUNTER (INPATIENT)
Facility: MEDICAL CENTER | Age: 27
LOS: 5 days | DRG: 556 | End: 2017-02-27
Attending: EMERGENCY MEDICINE | Admitting: INTERNAL MEDICINE
Payer: MEDICARE

## 2017-02-22 ENCOUNTER — RESOLUTE PROFESSIONAL BILLING HOSPITAL PROF FEE (OUTPATIENT)
Dept: HOSPITALIST | Facility: MEDICAL CENTER | Age: 27
End: 2017-02-22
Payer: MEDICARE

## 2017-02-22 ENCOUNTER — APPOINTMENT (OUTPATIENT)
Dept: RADIOLOGY | Facility: MEDICAL CENTER | Age: 27
DRG: 556 | End: 2017-02-22
Attending: INTERNAL MEDICINE
Payer: MEDICARE

## 2017-02-22 ENCOUNTER — APPOINTMENT (OUTPATIENT)
Dept: PHYSICAL THERAPY | Facility: REHABILITATION | Age: 27
End: 2017-02-22
Attending: INTERNAL MEDICINE
Payer: MEDICARE

## 2017-02-22 DIAGNOSIS — Z93.59 CHRONIC SUPRAPUBIC CATHETER (HCC): ICD-10-CM

## 2017-02-22 PROBLEM — R29.898 LEG WEAKNESS, BILATERAL: Status: ACTIVE | Noted: 2017-02-22

## 2017-02-22 LAB
ALBUMIN SERPL BCP-MCNC: 3.9 G/DL (ref 3.2–4.9)
ALBUMIN/GLOB SERPL: 1.9 G/DL
ALP SERPL-CCNC: 77 U/L (ref 30–99)
ALT SERPL-CCNC: 107 U/L (ref 2–50)
AMPHET UR QL SCN: NEGATIVE
ANION GAP SERPL CALC-SCNC: 11 MMOL/L (ref 0–11.9)
APPEARANCE UR: ABNORMAL
APTT PPP: 26.5 SEC (ref 24.7–36)
AST SERPL-CCNC: 64 U/L (ref 12–45)
BARBITURATES UR QL SCN: NEGATIVE
BASOPHILS # BLD AUTO: 0.5 % (ref 0–1.8)
BASOPHILS # BLD: 0.02 K/UL (ref 0–0.12)
BENZODIAZ UR QL SCN: NEGATIVE
BILIRUB SERPL-MCNC: 0.6 MG/DL (ref 0.1–1.5)
BILIRUB UR QL STRIP.AUTO: NEGATIVE
BNP SERPL-MCNC: 20 PG/ML (ref 0–100)
BUN SERPL-MCNC: 8 MG/DL (ref 8–22)
BZE UR QL SCN: NEGATIVE
CALCIUM SERPL-MCNC: 9.4 MG/DL (ref 8.5–10.5)
CANNABINOIDS UR QL SCN: NEGATIVE
CHLORIDE SERPL-SCNC: 103 MMOL/L (ref 96–112)
CK SERPL-CCNC: 45 U/L (ref 0–154)
CO2 SERPL-SCNC: 22 MMOL/L (ref 20–33)
COLOR UR: ABNORMAL
CREAT SERPL-MCNC: 0.55 MG/DL (ref 0.5–1.4)
CRP SERPL HS-MCNC: 0.71 MG/DL (ref 0–0.75)
CULTURE IF INDICATED INDCX: NO UA CULTURE
EKG IMPRESSION: NORMAL
EOSINOPHIL # BLD AUTO: 0.31 K/UL (ref 0–0.51)
EOSINOPHIL NFR BLD: 7.5 % (ref 0–6.9)
EPI CELLS #/AREA URNS HPF: ABNORMAL /HPF
ERYTHROCYTE [DISTWIDTH] IN BLOOD BY AUTOMATED COUNT: 44.1 FL (ref 35.9–50)
ERYTHROCYTE [SEDIMENTATION RATE] IN BLOOD BY WESTERGREN METHOD: 12 MM/HOUR (ref 0–20)
GFR SERPL CREATININE-BSD FRML MDRD: >60 ML/MIN/1.73 M 2
GLOBULIN SER CALC-MCNC: 2.1 G/DL (ref 1.9–3.5)
GLUCOSE SERPL-MCNC: 134 MG/DL (ref 65–99)
GLUCOSE UR STRIP.AUTO-MCNC: NEGATIVE MG/DL
HCG SERPL QL: NEGATIVE
HCT VFR BLD AUTO: 38 % (ref 37–47)
HGB BLD-MCNC: 12.3 G/DL (ref 12–16)
IMM GRANULOCYTES # BLD AUTO: 0.01 K/UL (ref 0–0.11)
IMM GRANULOCYTES NFR BLD AUTO: 0.2 % (ref 0–0.9)
INR PPP: 0.98 (ref 0.87–1.13)
KETONES UR STRIP.AUTO-MCNC: NEGATIVE MG/DL
LEUKOCYTE ESTERASE UR QL STRIP.AUTO: NEGATIVE
LIPASE SERPL-CCNC: 28 U/L (ref 11–82)
LYMPHOCYTES # BLD AUTO: 2.04 K/UL (ref 1–4.8)
LYMPHOCYTES NFR BLD: 49.4 % (ref 22–41)
MCH RBC QN AUTO: 29.1 PG (ref 27–33)
MCHC RBC AUTO-ENTMCNC: 32.4 G/DL (ref 33.6–35)
MCV RBC AUTO: 90 FL (ref 81.4–97.8)
MDMA UR QL SCN: NEGATIVE
METHADONE UR QL SCN: NEGATIVE
MICRO URNS: ABNORMAL
MONOCYTES # BLD AUTO: 0.38 K/UL (ref 0–0.85)
MONOCYTES NFR BLD AUTO: 9.2 % (ref 0–13.4)
NEUTROPHILS # BLD AUTO: 1.37 K/UL (ref 2–7.15)
NEUTROPHILS NFR BLD: 33.2 % (ref 44–72)
NITRITE UR QL STRIP.AUTO: NEGATIVE
NRBC # BLD AUTO: 0 K/UL
NRBC BLD AUTO-RTO: 0 /100 WBC
OPIATES UR QL SCN: NEGATIVE
OXYCODONE UR QL SCN: POSITIVE
PCP UR QL SCN: NEGATIVE
PH UR STRIP.AUTO: 8 [PH]
PLATELET # BLD AUTO: 204 K/UL (ref 164–446)
PMV BLD AUTO: 11.4 FL (ref 9–12.9)
POTASSIUM SERPL-SCNC: 3.7 MMOL/L (ref 3.6–5.5)
PROPOXYPH UR QL SCN: NEGATIVE
PROT SERPL-MCNC: 6 G/DL (ref 6–8.2)
PROT UR QL STRIP: NEGATIVE MG/DL
PROTHROMBIN TIME: 13.3 SEC (ref 12–14.6)
RBC # BLD AUTO: 4.22 M/UL (ref 4.2–5.4)
RBC # URNS HPF: ABNORMAL /HPF
RBC UR QL AUTO: ABNORMAL
SODIUM SERPL-SCNC: 136 MMOL/L (ref 135–145)
SP GR UR STRIP.AUTO: 1.01
TROPONIN I SERPL-MCNC: <0.01 NG/ML (ref 0–0.04)
WBC # BLD AUTO: 4.1 K/UL (ref 4.8–10.8)

## 2017-02-22 PROCEDURE — 80053 COMPREHEN METABOLIC PANEL: CPT

## 2017-02-22 PROCEDURE — 96360 HYDRATION IV INFUSION INIT: CPT

## 2017-02-22 PROCEDURE — 700102 HCHG RX REV CODE 250 W/ 637 OVERRIDE(OP): Performed by: EMERGENCY MEDICINE

## 2017-02-22 PROCEDURE — 86140 C-REACTIVE PROTEIN: CPT

## 2017-02-22 PROCEDURE — 80307 DRUG TEST PRSMV CHEM ANLYZR: CPT

## 2017-02-22 PROCEDURE — 96374 THER/PROPH/DIAG INJ IV PUSH: CPT

## 2017-02-22 PROCEDURE — 82550 ASSAY OF CK (CPK): CPT

## 2017-02-22 PROCEDURE — 700105 HCHG RX REV CODE 258: Performed by: INTERNAL MEDICINE

## 2017-02-22 PROCEDURE — 99285 EMERGENCY DEPT VISIT HI MDM: CPT

## 2017-02-22 PROCEDURE — 83690 ASSAY OF LIPASE: CPT

## 2017-02-22 PROCEDURE — A9270 NON-COVERED ITEM OR SERVICE: HCPCS | Performed by: INTERNAL MEDICINE

## 2017-02-22 PROCEDURE — 84703 CHORIONIC GONADOTROPIN ASSAY: CPT

## 2017-02-22 PROCEDURE — 85730 THROMBOPLASTIN TIME PARTIAL: CPT

## 2017-02-22 PROCEDURE — 700105 HCHG RX REV CODE 258: Performed by: EMERGENCY MEDICINE

## 2017-02-22 PROCEDURE — 81001 URINALYSIS AUTO W/SCOPE: CPT

## 2017-02-22 PROCEDURE — 770006 HCHG ROOM/CARE - MED/SURG/GYN SEMI*

## 2017-02-22 PROCEDURE — 73564 X-RAY EXAM KNEE 4 OR MORE: CPT | Mod: RT

## 2017-02-22 PROCEDURE — 87077 CULTURE AEROBIC IDENTIFY: CPT

## 2017-02-22 PROCEDURE — 96375 TX/PRO/DX INJ NEW DRUG ADDON: CPT

## 2017-02-22 PROCEDURE — 85610 PROTHROMBIN TIME: CPT

## 2017-02-22 PROCEDURE — 85652 RBC SED RATE AUTOMATED: CPT

## 2017-02-22 PROCEDURE — 93005 ELECTROCARDIOGRAM TRACING: CPT | Performed by: EMERGENCY MEDICINE

## 2017-02-22 PROCEDURE — 83880 ASSAY OF NATRIURETIC PEPTIDE: CPT

## 2017-02-22 PROCEDURE — A9270 NON-COVERED ITEM OR SERVICE: HCPCS | Performed by: EMERGENCY MEDICINE

## 2017-02-22 PROCEDURE — 99223 1ST HOSP IP/OBS HIGH 75: CPT | Mod: AI | Performed by: INTERNAL MEDICINE

## 2017-02-22 PROCEDURE — 70450 CT HEAD/BRAIN W/O DYE: CPT

## 2017-02-22 PROCEDURE — 87086 URINE CULTURE/COLONY COUNT: CPT

## 2017-02-22 PROCEDURE — 84484 ASSAY OF TROPONIN QUANT: CPT

## 2017-02-22 PROCEDURE — 71010 DX-CHEST-PORTABLE (1 VIEW): CPT

## 2017-02-22 PROCEDURE — 700111 HCHG RX REV CODE 636 W/ 250 OVERRIDE (IP): Performed by: INTERNAL MEDICINE

## 2017-02-22 PROCEDURE — 700102 HCHG RX REV CODE 250 W/ 637 OVERRIDE(OP): Performed by: INTERNAL MEDICINE

## 2017-02-22 PROCEDURE — 85025 COMPLETE CBC W/AUTO DIFF WBC: CPT

## 2017-02-22 PROCEDURE — 87186 SC STD MICRODIL/AGAR DIL: CPT

## 2017-02-22 PROCEDURE — 96361 HYDRATE IV INFUSION ADD-ON: CPT

## 2017-02-22 RX ORDER — ALBUTEROL SULFATE 90 UG/1
2 AEROSOL, METERED RESPIRATORY (INHALATION) EVERY 6 HOURS PRN
Status: DISCONTINUED | OUTPATIENT
Start: 2017-02-22 | End: 2017-02-27 | Stop reason: HOSPADM

## 2017-02-22 RX ORDER — NAPROXEN 500 MG/1
500 TABLET ORAL 2 TIMES DAILY WITH MEALS
Status: DISCONTINUED | OUTPATIENT
Start: 2017-02-22 | End: 2017-02-23

## 2017-02-22 RX ORDER — ZINC OXIDE 20 %
1 OINTMENT (GRAM) TOPICAL
Status: DISCONTINUED | OUTPATIENT
Start: 2017-02-22 | End: 2017-02-27 | Stop reason: HOSPADM

## 2017-02-22 RX ORDER — ZIPRASIDONE HYDROCHLORIDE 80 MG/1
160 CAPSULE ORAL
Status: DISCONTINUED | OUTPATIENT
Start: 2017-02-22 | End: 2017-02-27 | Stop reason: HOSPADM

## 2017-02-22 RX ORDER — ENEMA 19; 7 G/133ML; G/133ML
1 ENEMA RECTAL
Status: DISCONTINUED | OUTPATIENT
Start: 2017-02-22 | End: 2017-02-27 | Stop reason: HOSPADM

## 2017-02-22 RX ORDER — FUROSEMIDE 20 MG/1
20 TABLET ORAL DAILY
Status: ON HOLD | COMMUNITY
End: 2017-03-19

## 2017-02-22 RX ORDER — LACTULOSE 20 G/30ML
30 SOLUTION ORAL
Status: DISCONTINUED | OUTPATIENT
Start: 2017-02-22 | End: 2017-02-27 | Stop reason: HOSPADM

## 2017-02-22 RX ORDER — OMEPRAZOLE 20 MG/1
20 CAPSULE, DELAYED RELEASE ORAL 2 TIMES DAILY WITH MEALS
Status: DISCONTINUED | OUTPATIENT
Start: 2017-02-22 | End: 2017-02-27 | Stop reason: HOSPADM

## 2017-02-22 RX ORDER — ERGOCALCIFEROL 1.25 MG/1
50000 CAPSULE ORAL
Status: DISCONTINUED | OUTPATIENT
Start: 2017-02-24 | End: 2017-02-27 | Stop reason: HOSPADM

## 2017-02-22 RX ORDER — FLUOXETINE 10 MG/1
10 CAPSULE ORAL DAILY
Status: DISCONTINUED | OUTPATIENT
Start: 2017-02-23 | End: 2017-02-27 | Stop reason: HOSPADM

## 2017-02-22 RX ORDER — AMOXICILLIN 250 MG
1 CAPSULE ORAL NIGHTLY
Status: DISCONTINUED | OUTPATIENT
Start: 2017-02-22 | End: 2017-02-27 | Stop reason: HOSPADM

## 2017-02-22 RX ORDER — FENTANYL 25 UG/1
1 PATCH TRANSDERMAL
Status: DISCONTINUED | OUTPATIENT
Start: 2017-02-22 | End: 2017-02-27 | Stop reason: HOSPADM

## 2017-02-22 RX ORDER — SODIUM BICARBONATE 650 MG/1
650 TABLET ORAL 3 TIMES DAILY
Status: DISCONTINUED | OUTPATIENT
Start: 2017-02-22 | End: 2017-02-27 | Stop reason: HOSPADM

## 2017-02-22 RX ORDER — METHYLPREDNISOLONE SODIUM SUCCINATE 125 MG/2ML
62.5 INJECTION, POWDER, LYOPHILIZED, FOR SOLUTION INTRAMUSCULAR; INTRAVENOUS EVERY 8 HOURS
Status: DISCONTINUED | OUTPATIENT
Start: 2017-02-22 | End: 2017-02-23

## 2017-02-22 RX ORDER — BISACODYL 10 MG
10 SUPPOSITORY, RECTAL RECTAL
Status: DISCONTINUED | OUTPATIENT
Start: 2017-02-22 | End: 2017-02-27 | Stop reason: HOSPADM

## 2017-02-22 RX ORDER — AMOXICILLIN 250 MG
1 CAPSULE ORAL
Status: DISCONTINUED | OUTPATIENT
Start: 2017-02-22 | End: 2017-02-27 | Stop reason: HOSPADM

## 2017-02-22 RX ORDER — PROMETHAZINE HYDROCHLORIDE 25 MG/1
25 TABLET ORAL EVERY 6 HOURS PRN
Status: DISCONTINUED | OUTPATIENT
Start: 2017-02-22 | End: 2017-02-22

## 2017-02-22 RX ORDER — SODIUM CHLORIDE 9 MG/ML
INJECTION, SOLUTION INTRAVENOUS CONTINUOUS
Status: DISCONTINUED | OUTPATIENT
Start: 2017-02-22 | End: 2017-02-24

## 2017-02-22 RX ORDER — OXYBUTYNIN CHLORIDE 10 MG/1
10 TABLET, EXTENDED RELEASE ORAL 2 TIMES DAILY
Status: DISCONTINUED | OUTPATIENT
Start: 2017-02-22 | End: 2017-02-22

## 2017-02-22 RX ORDER — PROMETHAZINE HYDROCHLORIDE 25 MG/1
12.5-25 TABLET ORAL EVERY 4 HOURS PRN
Status: DISCONTINUED | OUTPATIENT
Start: 2017-02-22 | End: 2017-02-24

## 2017-02-22 RX ORDER — OXYCODONE AND ACETAMINOPHEN 10; 325 MG/1; MG/1
1 TABLET ORAL ONCE
Status: COMPLETED | OUTPATIENT
Start: 2017-02-22 | End: 2017-02-22

## 2017-02-22 RX ORDER — LEVOTHYROXINE SODIUM 0.07 MG/1
75 TABLET ORAL
Status: DISCONTINUED | OUTPATIENT
Start: 2017-02-23 | End: 2017-02-27 | Stop reason: HOSPADM

## 2017-02-22 RX ORDER — OXYCODONE AND ACETAMINOPHEN 10; 325 MG/1; MG/1
2 TABLET ORAL 2 TIMES DAILY
Status: DISCONTINUED | OUTPATIENT
Start: 2017-02-22 | End: 2017-02-22

## 2017-02-22 RX ORDER — ONDANSETRON 2 MG/ML
4 INJECTION INTRAMUSCULAR; INTRAVENOUS EVERY 4 HOURS PRN
Status: DISCONTINUED | OUTPATIENT
Start: 2017-02-22 | End: 2017-02-27 | Stop reason: HOSPADM

## 2017-02-22 RX ORDER — FAMOTIDINE 20 MG/1
20 TABLET, FILM COATED ORAL 2 TIMES DAILY
Status: DISCONTINUED | OUTPATIENT
Start: 2017-02-22 | End: 2017-02-27 | Stop reason: HOSPADM

## 2017-02-22 RX ORDER — ZIPRASIDONE HYDROCHLORIDE 80 MG/1
160 CAPSULE ORAL EVERY EVENING
COMMUNITY
End: 2017-07-06

## 2017-02-22 RX ORDER — PROMETHAZINE HYDROCHLORIDE 12.5 MG/1
12.5-25 SUPPOSITORY RECTAL EVERY 4 HOURS PRN
Status: DISCONTINUED | OUTPATIENT
Start: 2017-02-22 | End: 2017-02-24

## 2017-02-22 RX ORDER — CHOLECALCIFEROL (VITAMIN D3) 125 MCG
1000 CAPSULE ORAL 2 TIMES DAILY
Status: DISCONTINUED | OUTPATIENT
Start: 2017-02-22 | End: 2017-02-27 | Stop reason: HOSPADM

## 2017-02-22 RX ORDER — SODIUM CHLORIDE 9 MG/ML
INJECTION, SOLUTION INTRAVENOUS CONTINUOUS
Status: DISCONTINUED | OUTPATIENT
Start: 2017-02-22 | End: 2017-02-22

## 2017-02-22 RX ORDER — CYCLOBENZAPRINE HCL 10 MG
10 TABLET ORAL 2 TIMES DAILY
Status: DISCONTINUED | OUTPATIENT
Start: 2017-02-22 | End: 2017-02-27 | Stop reason: HOSPADM

## 2017-02-22 RX ORDER — OXYCODONE AND ACETAMINOPHEN 10; 325 MG/1; MG/1
2 TABLET ORAL EVERY 6 HOURS
Status: DISCONTINUED | OUTPATIENT
Start: 2017-02-22 | End: 2017-02-27 | Stop reason: HOSPADM

## 2017-02-22 RX ADMIN — FAMOTIDINE 20 MG: 20 TABLET, FILM COATED ORAL at 22:07

## 2017-02-22 RX ADMIN — SODIUM CHLORIDE: 9 INJECTION, SOLUTION INTRAVENOUS at 18:49

## 2017-02-22 RX ADMIN — SODIUM BICARBONATE TAB 650 MG 650 MG: 650 TAB at 17:17

## 2017-02-22 RX ADMIN — METHYLPREDNISOLONE SODIUM SUCCINATE 62.5 MG: 125 INJECTION, POWDER, FOR SOLUTION INTRAMUSCULAR; INTRAVENOUS at 22:08

## 2017-02-22 RX ADMIN — CYCLOBENZAPRINE HYDROCHLORIDE 10 MG: 10 TABLET, FILM COATED ORAL at 22:07

## 2017-02-22 RX ADMIN — STANDARDIZED SENNA CONCENTRATE AND DOCUSATE SODIUM 1 TABLET: 8.6; 5 TABLET, FILM COATED ORAL at 22:07

## 2017-02-22 RX ADMIN — FENTANYL 1 PATCH: 25 PATCH TRANSDERMAL at 14:47

## 2017-02-22 RX ADMIN — ZIPRASIDONE HYDROCHLORIDE 160 MG: 80 CAPSULE ORAL at 17:17

## 2017-02-22 RX ADMIN — OMEPRAZOLE 20 MG: 20 CAPSULE, DELAYED RELEASE ORAL at 17:17

## 2017-02-22 RX ADMIN — CYANOCOBALAMIN TAB 500 MCG 1000 MCG: 500 TAB at 22:07

## 2017-02-22 RX ADMIN — NAPROXEN 500 MG: 500 TABLET ORAL at 17:17

## 2017-02-22 RX ADMIN — SODIUM CHLORIDE: 9 INJECTION, SOLUTION INTRAVENOUS at 09:47

## 2017-02-22 RX ADMIN — SODIUM BICARBONATE TAB 650 MG 650 MG: 650 TAB at 22:11

## 2017-02-22 RX ADMIN — OXYCODONE HYDROCHLORIDE AND ACETAMINOPHEN 1 TABLET: 10; 325 TABLET ORAL at 11:35

## 2017-02-22 RX ADMIN — METHYLPREDNISOLONE SODIUM SUCCINATE 62.5 MG: 125 INJECTION, POWDER, FOR SOLUTION INTRAMUSCULAR; INTRAVENOUS at 14:46

## 2017-02-22 RX ADMIN — ONDANSETRON 4 MG: 2 INJECTION, SOLUTION INTRAMUSCULAR; INTRAVENOUS at 14:39

## 2017-02-22 RX ADMIN — OXYCODONE HYDROCHLORIDE AND ACETAMINOPHEN 2 TABLET: 10; 325 TABLET ORAL at 22:52

## 2017-02-22 RX ADMIN — OXYCODONE HYDROCHLORIDE AND ACETAMINOPHEN 2 TABLET: 10; 325 TABLET ORAL at 18:49

## 2017-02-22 ASSESSMENT — PAIN SCALES - GENERAL
PAINLEVEL_OUTOF10: 0
PAINLEVEL_OUTOF10: 7
PAINLEVEL_OUTOF10: 0

## 2017-02-22 ASSESSMENT — LIFESTYLE VARIABLES
EVER_SMOKED: NEVER
ALCOHOL_USE: NO

## 2017-02-22 NOTE — ED PROVIDER NOTES
ED Provider Note    Scribed for Kevin Delgado M.D. by Comfort Pimentel. 2/22/2017  9:18 AM    Primary Care Provider: Torres Brody M.D.  Means of arrival: EMS  History limited by: None    CHIEF COMPLAINT  Chief Complaint   Patient presents with   • Weakness     bilateral leg and right arm   • Numbness     right face       HPI  Kristin Balderrama is a 26 y.o. female who presents to the ED complaining of bilateral lower extremity and right upper extremity weakness. She takes steroids in order to improve her symptoms, but her steroidal medications are taking longer take effect. The last three days she has had six episodes of losing strength in her legs and core muscles. Per mother, she was walking in the kitchen at 6:00 AM, but at 6:45 AM the patient's right face and bilateral legs were numb. The patient reports associated blurred vision, head pain, and abdominal pain.  She denies any weight loss, sore throat, chest pain, shortness of breath, or new skin rashes. Family reports decrease urine production over the last several days. Per the patient's mother she last took furosemide 20 mg this morning.     REVIEW OF SYSTEMS    CONSTITUTIONAL:  Denies weight loss.  EYES:  Positive blurred vision  HENT:  Positive head pain. Denies sore throat  CARDIOVASCULAR:  Denies chest pain  RESPIRATORY:  Denies shortness of breath  GI:  Positive abdominal pain  : Positive decreased urine production  MUSCULOSKELETAL:  Positive weakness in bilateral lower extremities and right upper extremity.  SKIN:  Denies new skin rashes  NEUROLOGIC:  Positive numbness in right face  All other systems negative. C    PAST MEDICAL HISTORY  Past Medical History   Diagnosis Date   • Scoliosis    • Multiple personality disorder    • Chronic UTI 9/18/2010   • Heart burn    • Pain 08-15-12     back, 7/10   • History of falling    • Sinus tachycardia 10/31/2013   • Urinary incontinence    • Hypertension    • Disorder of thyroid    • Obesity    • Pneumonia  "2012   • ASTHMA      when around smoke   • Breath shortness      with tachycardia   • Anginal syndrome      random chest pain especially with tachycardia   • Psychosis    • Arthritis      osteo   • PCOS (polycystic ovarian syndrome)    • Gynecological disorder      PCOS   • Renal disorder      \"kidney disease, stage 1\" nephrologist, Dr. Vallejo   • Arrhythmia      \"sinus tachycardia\", cariologist, Dr. 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   • Sleep apnea      CPAP \"pulmonary doctor took me off mid year 2016\"   • 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CMS-HCC)        FAMILY HISTORY  Family History   Problem Relation Age of Onset   • Hypertension Mother    • Sleep Apnea Mother    • Heart Disease Mother    • Other Mother      hypothryod   • Hypertension Maternal Uncle    • Heart Disease Maternal Grandmother    • Hypertension Maternal Grandmother    • Other Sister      Narcolepsy;fibromyalsia;bone;nerve   • Genitourinary () Sister      endometriosis       SOCIAL HISTORY   reports that she has been passively smoking Cigarettes.  She has never used smokeless tobacco. She reports that she does not drink alcohol or use illicit drugs.    SURGICAL HISTORY  Past Surgical History   Procedure Laterality Date   • Neuro dest facet l/s w/ig sngl  4/21/2015     Performed by Reza Tabor at [facility]   • Recovery  1/27/2016     Procedure: CATH LAB EP STUDY WITH SINUS NODE MODIFICATION ABHINAV;  Surgeon: [facility];  Location: SURGERY PRE-POST PROC UNIT [facility];  Service:    • Katie by laparoscopy  8/29/2010     Performed by SHAYY JOHNS at SURGERY [facility]   • Lumbar fusion anterior  8/21/2012     Performed by SUSIE SAWANT at SURGERY [facility]   • Other cardiac surgery  1/2016     cardiac ablation   • Tonsillectomy       tonsillectomy   • Bowel stimulator insertion  7/13/2016     Procedure: BOWEL STIMULATOR " INSERTION FOR PERMANENT INTERSTIM SACRAL IMPLANT;  Surgeon: Joe Noyola M.D.;  Location: SURGERY [facility] ORS;  Service:    • Gastroscopy with balloon dilatation N/A 1/4/2017     Procedure: GASTROSCOPY WITH DILATATION;  Surgeon: Torres Vargas M.D.;  Location: SURGERY [facility];  Service:    • Muscle biopsy Right 1/26/2017     Procedure: MUSCLE BIOPSY - THIGH;  Surgeon: Isidro Vigil M.D.;  Location: SURGERY [facility] ORS;  Service:        CURRENT MEDICATIONS  Home Medications     Reviewed by Ulisses Noyola (Pharmacy Tech) on 02/22/17 at 1241  Med List Status: Complete    Medication Last Dose Status    albuterol 108 (90 BASE) MCG/ACT Aero Soln inhalation aerosol unknown Active    cyanocobalamin (HM VITAMIN B12) 500 MCG tablet 2/22/2017 Active    cyclobenzaprine (FLEXERIL) 10 MG Tab 2/22/2017 Active    fentanyl (DURAGESIC) 25 MCG/HR PATCH 72 HR 3 days ago Active    fluoxetine (PROZAC) 10 MG Cap 2/22/2017 Active    furosemide (LASIX) 20 MG Tab 2/22/2017 Active    Ivabradine HCl (CORLANOR) 5 MG Tab 2/22/2017 Active    lactulose 10 GM/15ML Solution 2/22/2017 Active    levothyroxine (SYNTHROID) 75 MCG Tab 2/22/2017 Active    naproxen (NAPROSYN) 500 MG Tab 2/22/2017 Active    nitrofurantoin monohydr macro (MACROBID) 100 MG Cap complete Active    omeprazole (PRILOSEC) 20 MG delayed-release capsule 2/22/2017 Active    oxycodone-acetaminophen (PERCOCET-10)  MG Tab 2/22/2017 Active    promethazine (PHENERGAN) 25 MG Tab unknown Active    ranitidine (ZANTAC) 150 MG Tab 2/22/2017 Active    sodium bicarbonate 325 MG Tab 2/22/2017 Active    vitamin D, Ergocalciferol, (DRISDOL) 94722 UNITS Cap capsule 2/17/2017 Active    Zinc Oxide 11.3 % Cream unknown Active    ziprasidone (GEODON) 80 MG Cap 2/21/2017 Active                ALLERGIES  Allergies   Allergen Reactions   • Cefdinir Shortness of Breath and Itching   • Depakote [Divalproex Sodium] Unspecified     Muscle spasms/muscle pain and weakness     •  "Abilify Unspecified     Headaches/muscle twitching     • Amitriptyline Unspecified     Headaches     • Amoxicillin Rash     Pt states \"I get a rash\".     • Ciprofloxacin Rash     Pt states \"I get a rash\".     • Clindamycin Nausea     Even with food     • Doxycycline Vomiting and Nausea     RXN=unknown   • Ees [Erythromycin] Vomiting and Nausea   • Flagyl [Metronidazole Hcl] Unspecified     \"eye problems\"     • Flomax [Tamsulosin Hydrochloride] Swelling   • Metformin Unspecified     Increased lactic acid      • Sulfa Drugs Hives and Rash     RXN=since childhood   • Tape Rash     Tears skin off   coban with Tegaderm tape ok  RXN=ongoing   • Vancomycin Itching     Pt becomes flushed in face and chest.   RXN=7/10/16   • Cephalexin [Keflex] Rash     Pt states she gets a rash with this medication   • Levofloxacin Unspecified     Leg muscle cramps       PHYSICAL EXAM  VITAL SIGNS: /82 mmHg  Pulse 97  Temp(Src) 35.9 °C (96.6 °F)  Resp 16  Ht 1.6 m (5' 2.99\")  Wt 115.667 kg (255 lb)  BMI 45.18 kg/m2  SpO2 96%  LMP 07/16/2016     Constitutional: Patient is awake and alert. No acute respiratory distress. Well developed, Well nourished, Non-toxic appearance. Incontinent at baseline.  HENT: Normocephalic, Atraumatic, Bilateral external ears normal, Oropharynx pink and moist with no exudates, Nose patent.  Eyes: PERRLA, EOMI, Sclera and conjunctiva clear, No discharge.   Neck:  Supple no nuchal rigidity, no thyromegaly or mass. Non-tender  Lymphatic: No supraclavicular lymph nodes.   Cardiovascular: Heart is regular rate and rhythm no murmur, rub or thrill.   Thorax & Lungs: Chest is symmetrical, with good breath sounds. No wheezing or crackles. No respiratory distress, No chest tenderness.   Abdomen: Suprapubic tenderness, Soft, no hepatosplenomegaly there is no guarding or rebound, No masses, No pulsatile masses. Bowel sounds are present.  Skin: Large incision to right anterior thigh with no obvious redness, top " of the incision line has fistula with bruising to inferior aspect. fullness.Warm, Dry, no petechia, purpura, or rash.   Back: Non tender with palpation, No CVA tenderness.   Extremities: No edema. Non tender.   Musculoskeletal: Left arm movement with 5/5 , cannot move right arm or bilateral legs. Pulses 2+ radially and femorally. No gross deformities noted.   Neurologic: Alert & oriented to person, time, and place. Sensory is intact to light touch to face, arms, and legs.  DTRs are symmetrical in biceps brachioradialis, patella and Achilles.       LABS  Results for orders placed or performed during the hospital encounter of 02/22/17   CBC WITH DIFFERENTIAL   Result Value Ref Range    WBC 4.1 (L) 4.8 - 10.8 K/uL    RBC 4.22 4.20 - 5.40 M/uL    Hemoglobin 12.3 12.0 - 16.0 g/dL    Hematocrit 38.0 37.0 - 47.0 %    MCV 90.0 81.4 - 97.8 fL    MCH 29.1 27.0 - 33.0 pg    MCHC 32.4 (L) 33.6 - 35.0 g/dL    RDW 44.1 35.9 - 50.0 fL    Platelet Count 204 164 - 446 K/uL    MPV 11.4 9.0 - 12.9 fL    Neutrophils-Polys 33.20 (L) 44.00 - 72.00 %    Lymphocytes 49.40 (H) 22.00 - 41.00 %    Monocytes 9.20 0.00 - 13.40 %    Eosinophils 7.50 (H) 0.00 - 6.90 %    Basophils 0.50 0.00 - 1.80 %    Immature Granulocytes 0.20 0.00 - 0.90 %    Nucleated RBC 0.00 /100 WBC    Neutrophils (Absolute) 1.37 (L) 2.00 - 7.15 K/uL    Lymphs (Absolute) 2.04 1.00 - 4.80 K/uL    Monos (Absolute) 0.38 0.00 - 0.85 K/uL    Eos (Absolute) 0.31 0.00 - 0.51 K/uL    Baso (Absolute) 0.02 0.00 - 0.12 K/uL    Immature Granulocytes (abs) 0.01 0.00 - 0.11 K/uL    NRBC (Absolute) 0.00 K/uL   COMP METABOLIC PANEL   Result Value Ref Range    Sodium 136 135 - 145 mmol/L    Potassium 3.7 3.6 - 5.5 mmol/L    Chloride 103 96 - 112 mmol/L    Co2 22 20 - 33 mmol/L    Anion Gap 11.0 0.0 - 11.9    Glucose 134 (H) 65 - 99 mg/dL    Bun 8 8 - 22 mg/dL    Creatinine 0.55 0.50 - 1.40 mg/dL    Calcium 9.4 8.5 - 10.5 mg/dL    AST(SGOT) 64 (H) 12 - 45 U/L    ALT(SGPT) 107 (H) 2 - 50  U/L    Alkaline Phosphatase 77 30 - 99 U/L    Total Bilirubin 0.6 0.1 - 1.5 mg/dL    Albumin 3.9 3.2 - 4.9 g/dL    Total Protein 6.0 6.0 - 8.2 g/dL    Globulin 2.1 1.9 - 3.5 g/dL    A-G Ratio 1.9 g/dL   LIPASE   Result Value Ref Range    Lipase 28 11 - 82 U/L   PROTHROMBIN TIME   Result Value Ref Range    PT 13.3 12.0 - 14.6 sec    INR 0.98 0.87 - 1.13   APTT   Result Value Ref Range    APTT 26.5 24.7 - 36.0 sec   TROPONIN   Result Value Ref Range    Troponin I <0.01 0.00 - 0.04 ng/mL   BTYPE NATRIURETIC PEPTIDE   Result Value Ref Range    B Natriuretic Peptide 20 0 - 100 pg/mL   URINALYSIS CULTURE, IF INDICATED   Result Value Ref Range    Micro Urine Req Microscopic     Color Straw     Character Hazy (A)     Specific Gravity 1.015 <1.035    Ph 8.0 5.0-8.0    Glucose Negative Negative mg/dL    Ketones Negative Negative mg/dL    Protein Negative Negative mg/dL    Bilirubin Negative Negative    Nitrite Negative Negative    Leukocyte Esterase Negative Negative    Occult Blood Trace (A) Negative    Culture Indicated No UA Culture   URINE DRUG SCREEN (TRIAGE)   Result Value Ref Range    Amphetamines Urine Negative Negative    Barbiturates Negative Negative    Benzodiazepines Negative Negative    Cocaine Metabolite Negative Negative    Methadone Negative Negative    Ecstasy Negative Negative    Opiates Negative Negative    Oxycodone Positive (A) Negative    Phencyclidine -Pcp Negative Negative    Propoxyphene Negative Negative    Cannabinoid Metab Negative Negative   HCG QUAL SERUM   Result Value Ref Range    Beta-Hcg Qualitative Serum Negative Negative   CREATINE KINASE   Result Value Ref Range    CPK Total 45 0 - 154 U/L   WESTERGREN SED RATE   Result Value Ref Range    Sed Rate Westergren 12 0 - 20 mm/hour   CRP QUANTITIVE (NON-CARDIAC)   Result Value Ref Range    Stat C-Reactive Protein 0.71 0.00 - 0.75 mg/dL   ESTIMATED GFR   Result Value Ref Range    GFR If African American >60 >60 mL/min/1.73 m 2    GFR If Non  African American >60 >60 mL/min/1.73 m 2   URINE MICROSCOPIC (W/UA)   Result Value Ref Range    RBC 2-5 (A) /hpf    Epithelial Cells Rare /hpf                             All labs reviewed by me.    RADIOLOGY/PROCEDURES  CT-HEAD W/O   Final Result      No acute intracranial abnormality.      DX-KNEE COMPLETE 4+ RIGHT   Final Result      No fracture or dislocation of RIGHT knee.      DX-CHEST-PORTABLE (1 VIEW)   Final Result      Hypoinflation without other evidence for acute cardiopulmonary disease.        The radiologist's interpretations of all radiological studies have been reviewed by me.     COURSE & MEDICAL DECISION MAKING  Pertinent Labs & Imaging studies reviewed. (See chart for details)    Differential diagnoses include but are not limited to stroke, neurologic deficit, M else, medication side effects, psychiatric illness, some type of muscle disease or spinal cord disease.    9:18 AM - Patient seen and examined at bedside. Ordered EKG, DX chest portable, DX knee complete 4+ right, and CT head without contrast, and all other labs.  Patient will be medicated with Percocet 10-325mg tablet and NS IV infusion for her symptoms.     9:34 AM Obtained and reviewed past medical records which indicated that the patient was seen in the ED on 1/22 and discharged on 2/6 for evaluation of numbness and headache.    9:36 AM Spoke with family who say patient was ambulatory upon discharge home on 2/6.    10:00 AM Paged Hospitalist    11:47 AM I discussed the patient's case and the above findings with Dr. Casas (Hospitalist) who agrees to admit the patient.    Decision Making  Patient has had multiple episodes of weakness and paralysis and has been worked up in the past with no obvious causes. She had a muscle biopsy done of her right thigh recently but the results are on known at this time. She again has had weakness for the last 3 days to her lower extremities and now this morning unable to move her right leg. Also there was  a history of having a right facial droop although at times she seems as facial droop at other times does not seem to be there. Her workup will be completed in the hospital. I ordered blood work on her. I discussed case with Dr. Casas and he will admit her to the hospital further care and evaluation. Again the etiology of her weakness is still unclear although I do not believe that this is a stroke.    Patient is not a stroke candidate she's had this multiple times in the past it is not unilateral. It does not fit a normal stroke pattern.    DISPOSITION:  Patient will be admitted to Dr. Casas, Hospitalist in guarded condition.      FINAL IMPRESSION  Weakness lower extremities right arm etiology unclear    PLAN  1. Admission [facility] Hospitalist  2. COntinued workup and evaluation of her weakness       Comfort RODRIGUEZ (Eva), am scribing for, and in the presence of, Kevin Delgado M.D..    Electronically signed by: Comfort Pimentel (Eva), 2/22/2017    Kevin RODRIGUEZ M.D. personally performed the services described in this documentation, as scribed by Comfort Pimentel in my presence, and it is both accurate and complete.    The note accurately reflects work and decisions made by me.  Kevin Delgado  2/22/2017  4:25 PM wb

## 2017-02-22 NOTE — IP AVS SNAPSHOT
" Home Care Instructions                                                                                                                  Name:Kristin Balderrama  Medical Record Number:[id number]  CSN: [id number]    YOB: 1990   Age: 26 y.o.  Sex: female  HT:1.6 m (5' 2.99\") WT: 123.6 kg (272 lb 7.8 oz)          Admit Date: 2/22/2017     Discharge Date:   Today's Date: 2/27/2017  Attending Doctor:  Earl Meehan M.D.                  Allergies:  Cefdinir; Depakote; Abilify; Amitriptyline; Amoxicillin; Ciprofloxacin; Clindamycin; Doxycycline; Ees; Flagyl; Flomax; Metformin; Sulfa drugs; Tape; Vancomycin; Cephalexin; and Levofloxacin            Discharge Instructions       Discharge Instructions    Discharged to home by car with relative. Discharged via wheelchair, hospital escort: Yes.  Special equipment needed: Oxygen    Be sure to schedule a follow-up appointment with your primary care doctor or any specialists as instructed.     Discharge Plan:   Diet Plan: Discussed  Activity Level: Discussed  Confirmed Follow up Appointment: Patient to Call and Schedule Appointment  Confirmed Symptoms Management: Discussed  Medication Reconciliation Updated: Yes  Influenza Vaccine Indication: Not indicated: Previously immunized this influenza season and > 8 years of age    I understand that a diet low in cholesterol, fat, and sodium is recommended for good health. Unless I have been given specific instructions below for another diet, I accept this instruction as my diet prescription.   Other diet: diabetic    Special Instructions: None    · Is patient discharged on Warfarin / Coumadin?   No     · Is patient Post Blood Transfusion?  No    p clinic.        Depression / Suicide Risk  Comments: 1) Follow up with Dr Fox in outpatient clinic. He has recommend monthly infusion of intravenous solumedrol. This can be arranged by Dr Fox at the outpatient infusion center. Also maintain follow up at [facility] neurology clinics.   "   2) Follow up with [facility] [place] for suprapubic cathter exchange.     3) Continue Macrobid for another seven days as prescribed.     4) Take prednisone 50 mg for one day, Then 40 mg for one day, Then 30 mg for one day, Then 20 mg for one day, Then 10 mg for one day and then stop.     5) Start baby aspirin and atorvastatin.     6) Apply miconazole cream to the catheter site skin.   7) Call your PCP office and setup a follow up appointment within one to two weeks of hospital stay.     8) Follow up with slee  As you are discharged from this [facility] facility, it is important to learn how to keep safe from harming yourself.    Recognize the warning signs:  · Abrupt changes in personality, positive or negative- including increase in energy   · Giving away possessions  · Change in eating patterns- significant weight changes-  positive or negative  · Change in sleeping patterns- unable to sleep or sleeping all the time   · Unwillingness or inability to communicate  · Depression  · Unusual sadness, discouragement and loneliness  · Talk of wanting to die  · Neglect of personal appearance   · Rebelliousness- reckless behavior  · Withdrawal from people/activities they love  · Confusion- inability to concentrate     If you or a loved one observes any of these behaviors or has concerns about self-harm, here's what you can do:  · Talk about it- your feelings and reasons for harming yourself  · Remove any means that you might use to hurt yourself (examples: pills, rope, extension cords, firearm)  · Get professional help from the community (Mental Health, Substance Abuse, psychological counseling)  · Do not be alone:Call your Safe Contact- someone whom you trust who will be there for you.  · Call your local CRISIS HOTLINE 107-1578 or 378-763-9089  · Call your local Children's Mobile Crisis Response Team [place] (237) 586-6422 or www.[employer]  · Call the toll free National Suicide Prevention Hotlines    · National Suicide Prevention Lifeline 262-805-ISAF (8255)  · National Hope Line Network 800-SUICIDE (739-0901)        Your appointments     Mar 01, 2017  9:00 AM   Individual Therapy with Blanca Brantley L.C.S.W.   [facility]    [address]  [address]  [place] [id number] [phone]            Mar 01, 2017 12:15 PM   ECHO with ECHO [facility] EXAM 12   ECHOCARDIOLOGY [facility] ([facility])    [address]  [place] [address]   [phone]           No prep            Mar 15, 2017  9:00 AM   Individual Therapy with Blanca Brantley L.C.S.W.   [facility]    [address]  [address]  [place] [address] [phone]            Mar 17, 2017  8:30 AM   Follow Up Med Management with Ronny Burntete M.D.   [facility] [address] [place] [address])    [address]  [address]  [place] [address]   [phone]            Mar 29, 2017  9:00 AM   Individual Therapy with Blanca Brantley L.C.S.W.   [facility]    [address]  [address]  [place] [address] [phone]            Apr 12, 2017  9:00 AM   Individual Therapy with Blanca Brantley L.C.S.W.   [facility]    [address]  [address]  [place] [address] [phone]            Apr 28, 2017 11:40 AM   New Patient with C Rotation   [facility] (--)    [address]  [place] [id number] [phone]           Please bring enclosed paperwork completed along with your insurance card and photo ID.            May 03, 2017  3:40 PM   Follow Up Visit with Sandoval Fox M.D.   [facility]rology (--)    [address] [facility]  [place] [address] [phone]           You will be receiving a confirmation call a few days before your appointment from our automated call confirmation system.            Jul 19, 2017  8:40 AM   FOLLOW UP with Torres Kumar M.D.   [facility] (--)     [address] [facility] [address]  [place] [address]   [phone]                 Discharge Medication Instructions:    Below are the medications your physician expects you to take upon discharge:    Review all your home medications and newly ordered medications with your doctor and/or pharmacist. Follow medication instructions as directed by your doctor and/or pharmacist.    Please keep your medication list with you and share with your physician.               Medication List      START taking these medications        Instructions    aspirin EC 81 MG Tbec   Commonly known as:  ECOTRIN    Take 1 Tab by mouth every day.   Dose:  81 mg       atorvastatin 40 MG Tabs   Last time this was given:  40 mg on 2/26/2017  8:49 PM   Commonly known as:  LIPITOR    Take 1 Tab by mouth every bedtime.   Dose:  40 mg       miconazole 2 % Crea   Commonly known as:  MICOTIN    Apply 1 Application to affected area(s) 2 times a day.   Dose:  1 Application       predniSONE 10 MG Tabs   Last time this was given:  50 mg on 2/27/2017  8:01 AM   Commonly known as:  DELTASONE    Take 50 mg for one day, Then 40 mg for one day, Then 30 mg for one day, Then 20 mg for one day, Then 10 mg for one day and stop         CHANGE how you take these medications        Instructions    nitrofurantoin monohydr macro 100 MG Caps   What changed:  when to take this   Last time this was given:  100 mg on 2/27/2017  8:01 AM   Commonly known as:  MACROBID    Take 1 Cap by mouth 2 times a day, with meals for 7 days.   Dose:  100 mg         CONTINUE taking these medications        Instructions    albuterol 108 (90 BASE) MCG/ACT Aers inhalation aerosol   Last time this was given:  2 Puffs on 2/27/2017  8:02 AM    Inhale 2 Puffs by mouth every 6 hours as needed for Shortness of Breath.   Dose:  2 Puff       cyclobenzaprine 10 MG Tabs   Last time this was given:  10 mg on 2/27/2017  8:01 AM   Commonly known as:  FLEXERIL    Take 10 mg by mouth 2 Times a Day. Indications:  Muscle Spasm   Dose:  10 mg       fentanyl 25 MCG/HR Pt72   Last time this was given:  1 Patch on 2/25/2017  3:50 PM   Commonly known as:  DURAGESIC    Apply 1 Patch to skin as directed every 72 hours.   Dose:  1 Patch       fluoxetine 10 MG Caps   Last time this was given:  10 mg on 2/27/2017  8:01 AM   Commonly known as:  PROZAC    Take 1 Cap by mouth every day.   Dose:  10 mg       furosemide 20 MG Tabs   Last time this was given:  20 mg on 2/27/2017  8:01 AM   Commonly known as:  LASIX    Take 20 mg by mouth See Admin Instructions. Is taking 20 mg BID X2 days ONLY (on 2/21 and 2/22/17) then 1 tablet a day thereafter   Dose:  20 mg       HM VITAMIN B12 500 MCG tablet   Last time this was given:  1,000 mcg on 2/27/2017  8:02 AM   Generic drug:  cyanocobalamin    Take 1,000 mcg by mouth 2 times a day.   Dose:  1000 mcg       ivabradine 5 MG Tabs tablet   Last time this was given:  5 mg on 2/27/2017  9:00 AM   Commonly known as:  CORLANOR    Take 1 Tab by mouth 2 Times a Day.   Dose:  1 Tab       lactulose 10 GM/15ML Soln   Last time this was given:  15 mL on 2/24/2017  1:59 PM    Take 10 g by mouth 2 times a day.   Dose:  10 g       levothyroxine 75 MCG Tabs   Last time this was given:  75 mcg on 2/27/2017  5:40 AM   Commonly known as:  SYNTHROID    Take 75 mcg by mouth Every morning on an empty stomach.   Dose:  75 mcg       omeprazole 20 MG delayed-release capsule   Last time this was given:  20 mg on 2/27/2017  5:40 AM   Commonly known as:  PRILOSEC    Take 20 mg by mouth 2 times a day.   Dose:  20 mg       oxycodone-acetaminophen  MG Tabs   Last time this was given:  2 Tabs on 2/27/2017 11:34 AM   Commonly known as:  PERCOCET-10    Take 2 Tabs by mouth every 6 hours. Two tabs  Indications: Pain   Dose:  2 Tab       promethazine 25 MG Tabs   Commonly known as:  PHENERGAN    Take 1 Tab by mouth every 6 hours as needed for Nausea/Vomiting.   Dose:  25 mg       ranitidine 150 MG Tabs   Commonly known as:   ZANTAC    Take 150 mg by mouth 2 times a day.   Dose:  150 mg       sodium bicarbonate 325 MG Tabs   Last time this was given:  650 mg on 2/27/2017  8:01 AM    Take 2 Tabs by mouth 3 times a day.   Dose:  650 mg       vitamin D (Ergocalciferol) 22621 UNITS Caps capsule   Last time this was given:  50,000 Units on 2/24/2017  8:22 AM   Commonly known as:  DRISDOL    Take 50,000 Units by mouth every Friday.   Dose:  04985 Units       Zinc Oxide 11.3 % Crea    Apply 1 Application to affected area(s) 1 time daily as needed (for skin irritations to coccyx).   Dose:  1 Application       ziprasidone 80 MG Caps   Last time this was given:  160 mg on 2/26/2017  6:00 PM   Commonly known as:  GEODON    Take 160 mg by mouth every evening.   Dose:  160 mg         STOP taking these medications     naproxen 500 MG Tabs   Commonly known as:  NAPROSYN               Instructions           Diet / Nutrition:    Follow any diet instructions given to you by your doctor or the dietician, including how much salt (sodium) you are allowed each day.    If you are overweight, talk to your doctor about a weight reduction plan.    Activity:    Remain physically active following your doctor's instructions about exercise and activity.    Rest often.     Any time you become even a little tired or short of breath, SIT DOWN and rest.    Worsening Symptoms:    Report any of the following signs and symptoms to the doctor's office immediately:    *Pain of jaw, arm, or neck  *Chest pain not relieved by medication                               *Dizziness or loss of consciousness  *Difficulty breathing even when at rest   *More tired than usual                                       *Bleeding drainage or swelling of surgical site  *Swelling of feet, ankles, legs or stomach                 *Fever (>100ºF)  *Pink or blood tinged sputum  *Weight gain (3lbs/day or 5lbs /week)           *Shock from internal defibrillator (if applicable)  *Palpitations or  irregular heartbeats                *Cool and/or numb extremities    Stroke Awareness    Common Risk Factors for Stroke include:    Age  Atrial Fibrillation  Carotid Artery Stenosis  Diabetes Mellitus  Excessive alcohol consumption  High blood pressure  Overweight   Physical inactivity  Smoking    Warning signs and symptoms of a stroke include:    *Sudden numbness or weakness of the face, arm or leg (especially on one side of the body).  *Sudden confusion, trouble speaking or understanding.  *Sudden trouble seeing in one or both eyes.  *Sudden trouble walking, dizziness, loss of balance or coordination.Sudden severe headache with no known cause.    It is very important to get treatment quickly when a stroke occurs. If you experience any of the above warning signs, call 911 immediately.                   Disclaimer         Quit Smoking / Tobacco Use:    I understand the use of any tobacco products increases my chance of suffering from future heart disease or stroke and could cause other illnesses which may shorten my life. Quitting the use of tobacco products is the single most important thing I can do to improve my health. For further information on smoking / tobacco cessation call a Toll Free Quit Line at 1-463.520.3920 (*[employer] [place]) or 1-451.715.8309 (American Lung Association) or you can access the web based program at www.lungusa.org.    [place] Tobacco Users Help Line:  (762) 845-2634       Toll Free: 1-351.370.1489  Quit Tobacco Program [facility] (612)325-8210    Crisis Hotline:    [place] Crisis Hotline:  6-832-CGSPNLJ or 1-716.680.1593    [place] Crisis Hotline:    1-225.697.5319 or 341-708-1137    Discharge Survey:   Thank you for choosing [facility]. We hope we did everything we could to make your hospital stay a pleasant one. You may be receiving a phone survey and we would appreciate your time and participation in answering the questions. Your input is very  valuable to us in our efforts to improve our service to our patients and their families.        My signature on this form indicates that:    1. I have reviewed and understand the above information.  2. My questions regarding this information have been answered to my satisfaction.  3. I have formulated a plan with my discharge nurse to obtain my prescribed medications for home.                  Disclaimer         __________________________________                     __________       ________                       Patient Signature                                                 Date                    Time

## 2017-02-22 NOTE — IP AVS SNAPSHOT
2/27/2017          Kristin Balderrama  [address] [place] [address]    Dear Kristin:    [facility] wants to ensure your discharge home is safe and you or your loved ones have had all your questions answered regarding your care after you leave the hospital.    You may receive a telephone call within two days of your discharge.  This call is to make certain you understand your discharge instructions as well as ensure we provided you with the best care possible during your stay with us.     The call will only last approximately 3-5 minutes and will be done by a nurse.    Once again, we want to ensure your discharge home is safe and that you have a clear understanding of any next steps in your care.  If you have any questions or concerns, please do not hesitate to contact us, we are here for you.  Thank you for choosing [facility] for your healthcare needs.    Sincerely,    Lorenzo Zuleta    [facility]

## 2017-02-22 NOTE — ED NOTES
Chief Complaint   Patient presents with   • Weakness     bilateral leg and right arm   • Numbness     right face     Pt bib ems from home, woke up this morning unable to ambulate. Pt c/o bilateral leg weakness,right arm and right face numbness.   Noted scar right upper leg, she said that she had biopsy 2wks ago and it is infected. Wound close, no redness or swelling or drainage noted.   ekg at bedside

## 2017-02-22 NOTE — IP AVS SNAPSHOT
CleanFish Access Code: Activation code not generated  Current CleanFish Status: Active    [employer]  A secure, online tool to manage your health information     [employer]’s CleanFish® is a secure, online tool that connects you to your personalized health information from the privacy of your home -- day or night - making it very easy for you to manage your healthcare. Once the activation process is completed, you can even access your medical information using the CleanFish rocio, which is available for free in the Apple Rocio store or Google Play store.     CleanFish provides the following levels of access (as shown below):   My Chart Features   [facility] Primary Care Doctor [facility]  Specialists [facility]  Urgent  Care Non-[facility]  Primary Care  Doctor   Email your healthcare team securely and privately 24/7 X X X X   Manage appointments: schedule your next appointment; view details of past/upcoming appointments X      Request prescription refills. X      View recent personal medical records, including lab and immunizations X X X X   View health record, including health history, allergies, medications X X X X   Read reports about your outpatient visits, procedures, consult and ER notes X X X X   See your discharge summary, which is a recap of your hospital and/or ER visit that includes your diagnosis, lab results, and care plan. X X       How to register for CleanFish:  1. Go to  [email or web address].  2. Click on the Sign Up Now box, which takes you to the New Member Sign Up page. You will need to provide the following information:  a. Enter your CleanFish Access Code exactly as it appears at the top of this page. (You will not need to use this code after you’ve completed the sign-up process. If you do not sign up before the expiration date, you must request a new code.)   b. Enter your date of birth.   c. Enter your home email address.   d. Click Submit, and follow the next screen’s instructions.  3. Create a CleanFish ID. This will  be your [employer] login ID and cannot be changed, so think of one that is secure and easy to remember.  4. Create a [employer] password. You can change your password at any time.  5. Enter your Password Reset Question and Answer. This can be used at a later time if you forget your password.   6. Enter your e-mail address. This allows you to receive e-mail notifications when new information is available in [employer].  7. Click Sign Up. You can now view your health information.    For assistance activating your [employer] account, call [phone]

## 2017-02-22 NOTE — IP AVS SNAPSHOT
" <p align=\"LEFT\"><IMG SRC=\"//EMRWB/blob$/Images/[email or web address]\" alt=\"Image\" WIDTH=\"50%\" HEIGHT=\"200\" BORDER=\"\"></p>                   Name:Kristin Balderrama  Medical Record Number:[id number]  CSN: [id number]    YOB: 1990   Age: 26 y.o.  Sex: female  HT:1.6 m (5' 2.99\") WT: 123.6 kg (272 lb 7.8 oz)          Admit Date: 2/22/2017     Discharge Date:   Today's Date: 2/27/2017  Attending Doctor:  Earl Meehan M.D.                  Allergies:  Cefdinir; Depakote; Abilify; Amitriptyline; Amoxicillin; Ciprofloxacin; Clindamycin; Doxycycline; Ees; Flagyl; Flomax; Metformin; Sulfa drugs; Tape; Vancomycin; Cephalexin; and Levofloxacin          Your appointments     Mar 01, 2017  9:00 AM   Individual Therapy with Blanca Brantley L.C.S.W.   [facility])    [address]  [facility] [address] [phone]            Mar 01, 2017 12:15 PM   ECHO with ECHO [facility] [place] EXAM 12   ECHOCARDIOLOGY [facility])    [address] [facility] [address]   [phone]           No prep            Mar 15, 2017  9:00 AM   Individual Therapy with Blanca Brantley L.C.S.W.   [facility])    [address]  [facility] [id number] [phone]            Mar 17, 2017  8:30 AM   Follow Up Med Management with Ronny Burnette M.D.   BE[facility]AVIORAL HEALTH [address] [place] [address])    [address] [facility]  [address]  [facility] [address] [phone]            Mar 29, 2017  9:00 AM   Individual Therapy with Blanca Brantley L.C.S.W.   [facility])    [address]  [facility] [id number] [phone]            Apr 12, 2017  9:00 AM   Individual Therapy with Blanca Brantley L.C.S.W.   [facility])    [address]  [address]  [place] [address] [phone]            Apr 28, 2017 11:40 AM   New Patient with C Rotation   [facility] (--)    [address]  [place] [address]   [phone]          " Please bring enclosed paperwork completed along with your insurance card and photo ID.            May 03, 2017  3:40 PM   Follow Up Visit with Sandoval Fox M.D.   [facility] Neurology (--)    [address], [address]  [place] [address] [phone]           You will be receiving a confirmation call a few days before your appointment from our automated call confirmation system.            Jul 19, 2017  8:40 AM   FOLLOW UP with Torres Kumar M.D.   [facility] (--)    [address]  [place] [address] [phone]                 Medication List      Take these Medications        Instructions    albuterol 108 (90 BASE) MCG/ACT Aers inhalation aerosol    Inhale 2 Puffs by mouth every 6 hours as needed for Shortness of Breath.   Dose:  2 Puff       aspirin EC 81 MG Tbec   Commonly known as:  ECOTRIN    Take 1 Tab by mouth every day.   Dose:  81 mg       atorvastatin 40 MG Tabs   Commonly known as:  LIPITOR    Take 1 Tab by mouth every bedtime.   Dose:  40 mg       cyclobenzaprine 10 MG Tabs   Commonly known as:  FLEXERIL    Take 10 mg by mouth 2 Times a Day. Indications: Muscle Spasm   Dose:  10 mg       fentanyl 25 MCG/HR Pt72   Commonly known as:  DURAGESIC    Apply 1 Patch to skin as directed every 72 hours.   Dose:  1 Patch       fluoxetine 10 MG Caps   Commonly known as:  PROZAC    Take 1 Cap by mouth every day.   Dose:  10 mg       furosemide 20 MG Tabs   Commonly known as:  LASIX    Take 20 mg by mouth See Admin Instructions. Is taking 20 mg BID X2 days ONLY (on 2/21 and 2/22/17) then 1 tablet a day thereafter   Dose:  20 mg       HM VITAMIN B12 500 MCG tablet   Generic drug:  cyanocobalamin    Take 1,000 mcg by mouth 2 times a day.   Dose:  1000 mcg       ivabradine 5 MG Tabs tablet   Commonly known as:  CORLANOR    Take 1 Tab by mouth 2 Times a Day.   Dose:  1 Tab       lactulose 10 GM/15ML Soln    Take 10 g by mouth 2 times a day.      Dose:  10 g       levothyroxine 75 MCG Tabs   Commonly known as:  SYNTHROID    Take 75 mcg by mouth Every morning on an empty stomach.   Dose:  75 mcg       miconazole 2 % Crea   Commonly known as:  MICOTIN    Apply 1 Application to affected area(s) 2 times a day.   Dose:  1 Application       nitrofurantoin monohydr macro 100 MG Caps   What changed:  when to take this   Commonly known as:  MACROBID    Take 1 Cap by mouth 2 times a day, with meals for 7 days.   Dose:  100 mg       omeprazole 20 MG delayed-release capsule   Commonly known as:  PRILOSEC    Take 20 mg by mouth 2 times a day.   Dose:  20 mg       oxycodone-acetaminophen  MG Tabs   Commonly known as:  PERCOCET-10    Take 2 Tabs by mouth every 6 hours. Two tabs  Indications: Pain   Dose:  2 Tab       predniSONE 10 MG Tabs   Commonly known as:  DELTASONE    Take 50 mg for one day, Then 40 mg for one day, Then 30 mg for one day, Then 20 mg for one day, Then 10 mg for one day and stop       promethazine 25 MG Tabs   Commonly known as:  PHENERGAN    Take 1 Tab by mouth every 6 hours as needed for Nausea/Vomiting.   Dose:  25 mg       ranitidine 150 MG Tabs   Commonly known as:  ZANTAC    Take 150 mg by mouth 2 times a day.   Dose:  150 mg       sodium bicarbonate 325 MG Tabs    Take 2 Tabs by mouth 3 times a day.   Dose:  650 mg       vitamin D (Ergocalciferol) 41390 UNITS Caps capsule   Commonly known as:  DRISDOL    Take 50,000 Units by mouth every Friday.   Dose:  77500 Units       Zinc Oxide 11.3 % Crea    Apply 1 Application to affected area(s) 1 time daily as needed (for skin irritations to coccyx).   Dose:  1 Application       ziprasidone 80 MG Caps   Commonly known as:  GEODON    Take 160 mg by mouth every evening.   Dose:  160 mg

## 2017-02-22 NOTE — ED NOTES
"Med rec complete per pt. Pt states she MUST have Geodon med no later than 1700, otherwise she will \"start to hallucinate.\"   "

## 2017-02-22 NOTE — ED NOTES
Transport at bedside for transfer to T207. Floor informed of patient transfer. All belongings with pt.

## 2017-02-22 NOTE — PROGRESS NOTES
PT ADMITTED TO UNIT ORIENTED TO ROOM DISCUSSED PLAN OF CARE ASSISTED WITH 3 TO BED  CALL LIGHT WITHIN REACH NURSING HISTORY AND ASSESSMENT DONE CONDITION STABLE

## 2017-02-23 ENCOUNTER — APPOINTMENT (OUTPATIENT)
Dept: PHYSICAL THERAPY | Facility: REHABILITATION | Age: 27
End: 2017-02-23
Attending: INTERNAL MEDICINE
Payer: MEDICARE

## 2017-02-23 PROBLEM — R29.898 WEAKNESS OF RIGHT UPPER EXTREMITY: Status: ACTIVE | Noted: 2017-02-23

## 2017-02-23 LAB
ALBUMIN SERPL BCP-MCNC: 3.8 G/DL (ref 3.2–4.9)
ALBUMIN/GLOB SERPL: 1.5 G/DL
ALP SERPL-CCNC: 77 U/L (ref 30–99)
ALT SERPL-CCNC: 110 U/L (ref 2–50)
ANION GAP SERPL CALC-SCNC: 13 MMOL/L (ref 0–11.9)
APPEARANCE UR: ABNORMAL
AST SERPL-CCNC: 69 U/L (ref 12–45)
BILIRUB SERPL-MCNC: 0.6 MG/DL (ref 0.1–1.5)
BILIRUB UR QL STRIP.AUTO: NEGATIVE
BUN SERPL-MCNC: 14 MG/DL (ref 8–22)
CALCIUM SERPL-MCNC: 9.2 MG/DL (ref 8.5–10.5)
CHLORIDE SERPL-SCNC: 101 MMOL/L (ref 96–112)
CO2 SERPL-SCNC: 19 MMOL/L (ref 20–33)
COLOR UR: ABNORMAL
CREAT SERPL-MCNC: 0.77 MG/DL (ref 0.5–1.4)
ERYTHROCYTE [DISTWIDTH] IN BLOOD BY AUTOMATED COUNT: 42.7 FL (ref 35.9–50)
GFR SERPL CREATININE-BSD FRML MDRD: >60 ML/MIN/1.73 M 2
GLOBULIN SER CALC-MCNC: 2.5 G/DL (ref 1.9–3.5)
GLUCOSE SERPL-MCNC: 277 MG/DL (ref 65–99)
GLUCOSE UR STRIP.AUTO-MCNC: >1000 MG/DL
HCT VFR BLD AUTO: 38.5 % (ref 37–47)
HGB BLD-MCNC: 12.9 G/DL (ref 12–16)
KETONES UR STRIP.AUTO-MCNC: 20 MG/DL
LEUKOCYTE ESTERASE UR QL STRIP.AUTO: NEGATIVE
MCH RBC QN AUTO: 29.9 PG (ref 27–33)
MCHC RBC AUTO-ENTMCNC: 33.5 G/DL (ref 33.6–35)
MCV RBC AUTO: 89.1 FL (ref 81.4–97.8)
MICRO URNS: ABNORMAL
NITRITE UR QL STRIP.AUTO: NEGATIVE
PH UR STRIP.AUTO: 5.5 [PH]
PLATELET # BLD AUTO: 209 K/UL (ref 164–446)
PMV BLD AUTO: 11.3 FL (ref 9–12.9)
POTASSIUM SERPL-SCNC: 4.9 MMOL/L (ref 3.6–5.5)
PROT SERPL-MCNC: 6.3 G/DL (ref 6–8.2)
PROT UR QL STRIP: NEGATIVE MG/DL
RBC # BLD AUTO: 4.32 M/UL (ref 4.2–5.4)
RBC # URNS HPF: NORMAL /HPF
RBC UR QL AUTO: NEGATIVE
SODIUM SERPL-SCNC: 133 MMOL/L (ref 135–145)
SP GR UR STRIP.AUTO: 1.03
URATE CRY #/AREA URNS HPF: NORMAL /HPF
WBC # BLD AUTO: 5 K/UL (ref 4.8–10.8)
WBC #/AREA URNS HPF: NORMAL /HPF

## 2017-02-23 PROCEDURE — 85027 COMPLETE CBC AUTOMATED: CPT

## 2017-02-23 PROCEDURE — G8979 MOBILITY GOAL STATUS: HCPCS | Mod: CI

## 2017-02-23 PROCEDURE — 36415 COLL VENOUS BLD VENIPUNCTURE: CPT

## 2017-02-23 PROCEDURE — 700102 HCHG RX REV CODE 250 W/ 637 OVERRIDE(OP): Performed by: INTERNAL MEDICINE

## 2017-02-23 PROCEDURE — 99233 SBSQ HOSP IP/OBS HIGH 50: CPT | Performed by: INTERNAL MEDICINE

## 2017-02-23 PROCEDURE — 80053 COMPREHEN METABOLIC PANEL: CPT

## 2017-02-23 PROCEDURE — A9270 NON-COVERED ITEM OR SERVICE: HCPCS | Performed by: INTERNAL MEDICINE

## 2017-02-23 PROCEDURE — G8987 SELF CARE CURRENT STATUS: HCPCS | Mod: CL

## 2017-02-23 PROCEDURE — 97162 PT EVAL MOD COMPLEX 30 MIN: CPT

## 2017-02-23 PROCEDURE — 700111 HCHG RX REV CODE 636 W/ 250 OVERRIDE (IP): Performed by: INTERNAL MEDICINE

## 2017-02-23 PROCEDURE — 700105 HCHG RX REV CODE 258: Performed by: INTERNAL MEDICINE

## 2017-02-23 PROCEDURE — 97166 OT EVAL MOD COMPLEX 45 MIN: CPT

## 2017-02-23 PROCEDURE — 81001 URINALYSIS AUTO W/SCOPE: CPT

## 2017-02-23 PROCEDURE — G8988 SELF CARE GOAL STATUS: HCPCS | Mod: CK

## 2017-02-23 PROCEDURE — G8978 MOBILITY CURRENT STATUS: HCPCS | Mod: CK

## 2017-02-23 PROCEDURE — 770006 HCHG ROOM/CARE - MED/SURG/GYN SEMI*

## 2017-02-23 RX ORDER — FUROSEMIDE 20 MG/1
20 TABLET ORAL DAILY
Status: DISCONTINUED | OUTPATIENT
Start: 2017-02-23 | End: 2017-02-27 | Stop reason: HOSPADM

## 2017-02-23 RX ORDER — GABAPENTIN 600 MG/1
600 TABLET ORAL EVERY MORNING
Status: DISCONTINUED | OUTPATIENT
Start: 2017-02-24 | End: 2017-02-27 | Stop reason: HOSPADM

## 2017-02-23 RX ORDER — ATORVASTATIN CALCIUM 40 MG/1
40 TABLET, FILM COATED ORAL
Status: DISCONTINUED | OUTPATIENT
Start: 2017-02-23 | End: 2017-02-27 | Stop reason: HOSPADM

## 2017-02-23 RX ORDER — GABAPENTIN 600 MG/1
1200 TABLET ORAL EVERY EVENING
Status: DISCONTINUED | OUTPATIENT
Start: 2017-02-23 | End: 2017-02-27 | Stop reason: HOSPADM

## 2017-02-23 RX ORDER — ASPIRIN 325 MG
325 TABLET ORAL DAILY
Status: DISCONTINUED | OUTPATIENT
Start: 2017-02-23 | End: 2017-02-27 | Stop reason: HOSPADM

## 2017-02-23 RX ORDER — HEPARIN SODIUM 5000 [USP'U]/ML
5000 INJECTION, SOLUTION INTRAVENOUS; SUBCUTANEOUS EVERY 8 HOURS
Status: DISCONTINUED | OUTPATIENT
Start: 2017-02-23 | End: 2017-02-27 | Stop reason: HOSPADM

## 2017-02-23 RX ORDER — GABAPENTIN 600 MG/1
600 TABLET ORAL 3 TIMES DAILY
Status: DISCONTINUED | OUTPATIENT
Start: 2017-02-23 | End: 2017-02-23

## 2017-02-23 RX ADMIN — SODIUM CHLORIDE 500 MG: 9 INJECTION, SOLUTION INTRAVENOUS at 21:24

## 2017-02-23 RX ADMIN — FAMOTIDINE 20 MG: 20 TABLET, FILM COATED ORAL at 21:24

## 2017-02-23 RX ADMIN — HEPARIN SODIUM 5000 UNITS: 5000 INJECTION, SOLUTION INTRAVENOUS; SUBCUTANEOUS at 21:23

## 2017-02-23 RX ADMIN — CYCLOBENZAPRINE HYDROCHLORIDE 10 MG: 10 TABLET, FILM COATED ORAL at 08:02

## 2017-02-23 RX ADMIN — SODIUM CHLORIDE: 9 INJECTION, SOLUTION INTRAVENOUS at 21:27

## 2017-02-23 RX ADMIN — SODIUM BICARBONATE TAB 650 MG 650 MG: 650 TAB at 14:26

## 2017-02-23 RX ADMIN — PROCHLORPERAZINE EDISYLATE 10 MG: 5 INJECTION INTRAMUSCULAR; INTRAVENOUS at 11:35

## 2017-02-23 RX ADMIN — ONDANSETRON 4 MG: 2 INJECTION, SOLUTION INTRAMUSCULAR; INTRAVENOUS at 08:02

## 2017-02-23 RX ADMIN — OXYCODONE HYDROCHLORIDE AND ACETAMINOPHEN 2 TABLET: 10; 325 TABLET ORAL at 11:35

## 2017-02-23 RX ADMIN — ONDANSETRON 4 MG: 2 INJECTION, SOLUTION INTRAMUSCULAR; INTRAVENOUS at 17:40

## 2017-02-23 RX ADMIN — GABAPENTIN 1200 MG: 600 TABLET ORAL at 14:30

## 2017-02-23 RX ADMIN — SODIUM BICARBONATE TAB 650 MG 650 MG: 650 TAB at 21:24

## 2017-02-23 RX ADMIN — LEVOTHYROXINE SODIUM 75 MCG: 75 TABLET ORAL at 05:47

## 2017-02-23 RX ADMIN — METHYLPREDNISOLONE SODIUM SUCCINATE 62.5 MG: 125 INJECTION, POWDER, FOR SOLUTION INTRAMUSCULAR; INTRAVENOUS at 05:46

## 2017-02-23 RX ADMIN — OMEPRAZOLE 20 MG: 20 CAPSULE, DELAYED RELEASE ORAL at 17:39

## 2017-02-23 RX ADMIN — OXYCODONE HYDROCHLORIDE AND ACETAMINOPHEN 2 TABLET: 10; 325 TABLET ORAL at 17:40

## 2017-02-23 RX ADMIN — OXYCODONE HYDROCHLORIDE AND ACETAMINOPHEN 2 TABLET: 10; 325 TABLET ORAL at 23:36

## 2017-02-23 RX ADMIN — GABAPENTIN 600 MG: 600 TABLET ORAL at 23:37

## 2017-02-23 RX ADMIN — NAPROXEN 500 MG: 500 TABLET ORAL at 08:10

## 2017-02-23 RX ADMIN — METHYLPREDNISOLONE SODIUM SUCCINATE 62.5 MG: 125 INJECTION, POWDER, FOR SOLUTION INTRAMUSCULAR; INTRAVENOUS at 14:27

## 2017-02-23 RX ADMIN — ZINC OXIDE 1 APPLICATION: 200 OINTMENT TOPICAL at 08:03

## 2017-02-23 RX ADMIN — OMEPRAZOLE 20 MG: 20 CAPSULE, DELAYED RELEASE ORAL at 08:02

## 2017-02-23 RX ADMIN — CYANOCOBALAMIN TAB 500 MCG 1000 MCG: 500 TAB at 08:02

## 2017-02-23 RX ADMIN — CYANOCOBALAMIN TAB 500 MCG 1000 MCG: 500 TAB at 21:23

## 2017-02-23 RX ADMIN — FAMOTIDINE 20 MG: 20 TABLET, FILM COATED ORAL at 08:03

## 2017-02-23 RX ADMIN — FUROSEMIDE 20 MG: 20 TABLET ORAL at 14:27

## 2017-02-23 RX ADMIN — ATORVASTATIN CALCIUM 40 MG: 40 TABLET, FILM COATED ORAL at 21:23

## 2017-02-23 RX ADMIN — ZIPRASIDONE HYDROCHLORIDE 160 MG: 80 CAPSULE ORAL at 17:40

## 2017-02-23 RX ADMIN — CYCLOBENZAPRINE HYDROCHLORIDE 10 MG: 10 TABLET, FILM COATED ORAL at 21:24

## 2017-02-23 RX ADMIN — FLUOXETINE 10 MG: 10 CAPSULE ORAL at 08:02

## 2017-02-23 RX ADMIN — STANDARDIZED SENNA CONCENTRATE AND DOCUSATE SODIUM 1 TABLET: 8.6; 5 TABLET, FILM COATED ORAL at 21:25

## 2017-02-23 RX ADMIN — ASPIRIN 325 MG: 325 TABLET, COATED ORAL at 14:26

## 2017-02-23 RX ADMIN — SODIUM BICARBONATE TAB 650 MG 650 MG: 650 TAB at 08:03

## 2017-02-23 RX ADMIN — OXYCODONE HYDROCHLORIDE AND ACETAMINOPHEN 2 TABLET: 10; 325 TABLET ORAL at 05:46

## 2017-02-23 RX ADMIN — SODIUM CHLORIDE: 9 INJECTION, SOLUTION INTRAVENOUS at 08:02

## 2017-02-23 ASSESSMENT — ENCOUNTER SYMPTOMS
TINGLING: 1
LOSS OF CONSCIOUSNESS: 0
BLURRED VISION: 0
DIARRHEA: 0
FEVER: 0
BACK PAIN: 1
ABDOMINAL PAIN: 0
WHEEZING: 0
SHORTNESS OF BREATH: 0
DIZZINESS: 0
FOCAL WEAKNESS: 1
SENSORY CHANGE: 1
HEADACHES: 0
CHILLS: 0
COUGH: 0
DOUBLE VISION: 0

## 2017-02-23 ASSESSMENT — PAIN SCALES - GENERAL
PAINLEVEL_OUTOF10: 6
PAINLEVEL_OUTOF10: 6
PAINLEVEL_OUTOF10: 7
PAINLEVEL_OUTOF10: 6

## 2017-02-23 ASSESSMENT — COPD QUESTIONNAIRES
COPD SCREENING SCORE: 1
HAVE YOU SMOKED AT LEAST 100 CIGARETTES IN YOUR ENTIRE LIFE: NO/DON'T KNOW
DURING THE PAST 4 WEEKS HOW MUCH DID YOU FEEL SHORT OF BREATH: SOME OF THE TIME
DO YOU EVER COUGH UP ANY MUCUS OR PHLEGM?: NO/ONLY WITH OCCASIONAL COLDS OR INFECTIONS

## 2017-02-23 ASSESSMENT — GAIT ASSESSMENTS: GAIT LEVEL OF ASSIST: UNABLE TO PARTICIPATE

## 2017-02-23 ASSESSMENT — LIFESTYLE VARIABLES: DO YOU DRINK ALCOHOL: NO

## 2017-02-23 ASSESSMENT — ACTIVITIES OF DAILY LIVING (ADL): TOILETING: REQUIRES ASSIST

## 2017-02-23 NOTE — PROGRESS NOTES
Received patient from CDU, pt on bed, unable to move self at this point. VSS, NAD. Has complaints of luight sensitive headache. Medications placed in med drawer. Will ask family to provide 'Gralise' (extended release gabapentin). Kitchen called for late dinner tray. Bed in the low and locked position, call light in reach.

## 2017-02-23 NOTE — DIETARY
NUTRITION SERVICES: BMI - Pt with BMI >40 (=45.18). Weight loss counseling not appropriate in acute care setting. RECOMMEND - Referral to outpatient nutrition services for weight management after D/C.

## 2017-02-23 NOTE — PROGRESS NOTES
Pt stable during the night. Denies being able to ambulate. Suprapubic cath in place and draining straw yellow urine. Pt requesting lasix. Will pass on to day nurse. Requesting pain medications between scheduled pain medications when her vitals were taken. With hourly rounding in place pt is always asleep. Vitals stable. Bed lowered, locked and call light within reach.

## 2017-02-23 NOTE — THERAPY
"Physical Therapy Evaluation completed.   Bed Mobility:  Supine to Sit: Maximal Assist (x 2 person)  Transfers: Sit to Stand: Unable to Participate  Gait: Level Of Assist: Unable to Participate     Plan of Care: Will benefit from Physical Therapy 3 times per week  Discharge Recommendations: Equipment: No Equipment Needed. Post-acute therapy recommended before discharged home.    See \"Rehab Therapy-Acute\" Patient Summary Report for complete documentation.     "

## 2017-02-23 NOTE — THERAPY
"Occupational Therapy Evaluation completed.   Functional Status:  Max A of 2 supine > < EOB, unable to stand, total A LB dressing, set-up self-feeding with L hand  Plan of Care: Will benefit from Occupational Therapy 3 times per week  Discharge Recommendations:  Equipment: No Equipment Needed. Post-acute therapy recommended before discharged home.    See \"Rehab Therapy-Acute\" Patient Summary Report for complete documentation.    26 y.o. female with h/o fibromyalgia, chronic pain who presents with RUE/BLE profound weakness impacting basic self-care and mobility. Unclear etiology. Pt receiving IV steroids. OT provided education on recommendation for pill box system to manage meds. Pt verbalizes understanding. Pt would benefit from acute OT and post-acute services to maximize functional independence.     "

## 2017-02-23 NOTE — H&P
PRIMARY CARE PHYSICIAN:  Dr. Brody.    CHIEF COMPLAINT:  Lower extremity weakness.    HISTORY OF PRESENT ILLNESS:  She is a 26-year-old female, initially came to   the ER with lower extremity weakness starting 3 days ago.  Also, patient was   complaining about right facial numbness and right hand numbness which started   around 6:45 this a.m.  Patient has similar admission in the past, this time   with different neurology symptom.  The patient was evaluated by neurologist in   the past as well as psychiatric.  Patient was just recently discharged from   the hospital in January as well.  She said that only medication that make her   feel better is Solu-Medrol and she requested to be treated with Solu-Medrol.    Also, per patient, Dr. Fox and Dr. Reno refer her to see a neurologist in   [place].  They have not got a chance to see them yet.  Apart from that,   denied any chest pain, palpitation or any bowel or bladder problem.    REVIEW OF SYSTEMS:  All other systems were reviewed and negative.  The rest   per HPI.    ALLERGY HISTORY:  ALLERGIC TO CEFDINIR, DEPAKOTE, ABILIFY, AMITRIPTYLINE,   AMOXICILLIN, CIPROFLOXACIN, CLINDAMYCIN, DOXYCYCLINE, FLAGYL, FLOMAX,   METFORMIN, SULFA DRUGS, AND TAPE.    PAST MEDICAL HISTORY:  Fibromyalgia, chronic pain syndrome, history of   dysphagia, history of depression, psychosis, anxiety disorder, history of   latent TB, history of supraventricular tachycardia, history of urinary   retention, polycystic ovarian syndrome.    PAST SURGICAL HISTORY:  Cholecystectomy, lumbar fusion, cardiac ablation,   tonsillectomy, bowel stimulator insertion, gastroscopy with balloon   dilatation.    SOCIAL HISTORY:  Denies smoking, alcohol drinking or illicit drug abuse.    FAMILY HISTORY:  No pertinent family history.    OUTPATIENT MEDICATIONS:  1.  Ziprasidone 160 mg at bedtime.  2.  Fentanyl patch 25 mcg every 72 hours.  3.  Lactulose.  4.  Vitamin D.  5.  Cyclobenzaprine.  6.  Flexeril  10 mg b.i.d.  7.  _____ mg daily.  8.  Percocet 10/325 mg 2 tablets b.i.d. p.r.n.  9.  Naproxen 500 mg b.i.d.  10.  Albuterol inhaler.  11.  Vitamin B12.  12.  Sodium bicarbonate 325 mg 2 tablets 3 times a day.  13.  Levothyroxine 75 mcg daily.  14.  Omeprazole 20 mg b.i.d.  15.  Ranitidine 150 mg b.i.d.  16.  Corlanor 5 mg b.i.d.    PHYSICAL EXAMINATION:  VITAL SIGNS:  Temperature 96.6, pulse rate 89, respiratory rate 20, blood   pressure 160/83, satting 93% on room air.  GENERAL:  Alert, communicative.  HEENT:  Normocephalic, atraumatic.  NECK:  Supple.  No neck gland enlargement.  CARDIOVASCULAR:  Normal first and second sound.  No murmur.  RESPIRATORY:  Normal respiratory effort.  No wheezing.  ABDOMEN:  Soft, bowel sounds present.  Nontender.  CENTRAL NERVOUS SYSTEM:  Cranial nerves II-XII intact.  No movement in her   bilateral lower extremities as well as no movement in the right arm.  EXTREMITIES:  No edema, clubbing.  PSYCHIATRIC:  Seems to be alert and oriented x3.  SKIN:  Warm and moist.    LABORATORY DATA:  CBC within normal limit.  Chem panel within normal limits   except glucose 134.  Troponin negative.  BNP within normal limit.  CT scan of   the head is currently pending.    ASSESSMENT AND PLAN:  1.  Multiple neurology symptoms including a right facial tingling, numbness,   right arm tingling, numbness and right arm paralysis, and bilateral lower   extremity weakness.  Patient was evaluated by neurologist in the past as well   as psychiatry.  Her symptoms are not consistent with stroke.  We are currently   waiting for CT scan of the head.  Per patient, she is waiting to be seen by a   neurologist in [place].  Also, she has a biopsy of the thigh done last   month and it only show mild muscle atrophy.  Since her symptoms get better   with steroids in the past, I will start her on steroids for now.  Please   consult neurology in the morning.  2.  Chronic pain syndrome.  Continue outpatient  medication.  3.  Hyperglycemia.  No history of diabetes.  4.  Elevated liver enzyme.  We will follow up chem panel in the morning.  5.  Psychiatric disorder, continue outpatient medication.  6.  Deep venous thrombosis prophylaxis, heparin.  7.  The patient is full code.       ____________________________________     MD EDGAR OVIEDO / CHRISTELLE    DD:  02/22/2017 12:42:49  DT:  02/22/2017 17:14:12    D#:  [id number]  Job#:  [id number]

## 2017-02-23 NOTE — DISCHARGE PLANNING
Care Transition Team Discharge Planning    Anticipated Discharge Information  Anticipated discharge disposition: [facility]  Discharge Address: [address] [place] [address]  Discharge Contact Phone Number: [phone]              Discharge Plan:  Kristin states she plans to discharge home where she lives with her grandparents.  She reports she has a hospital bed, wheelchair, walker and cane.  She is currently on oxygen and reports that her insurance covers [employer] for equipment so if oxygen needed at discharge this would be her choice.      Kristin reports that she has caregiver covered by her insurance for 2.5 hours every morning who comes from [facility] and does her bathing, dressing, feeding and assistance with any ADL/IADL needs.  She states that her grandmother helps her with potty, meals, etc at night.      Kristin is requesting assistance to apply for food stamps.  She has had Access transportation before, but her family prefers she use her mother and her mother takes her to doctor appointments and specialists she will see in [facility] if they accept her.      Kristin reports that she should be up and moving over next two days and then plans to discharge home with home health if needed and await [place] doctor to approve taking her and get appointment.  She states she cannot go to SNFs as they all decline due to her insurance and mental health issues.  Patient reports that she has accidentally taken an extra pill before and overdosed, not intentional.  She reports depression controlled with Rxs and no thoughts or plans of harming herself.      Needs:  Watch for continued O2 use and order O2 from [facility] is to discharge home with.  Assist with application for food stamps.      KRYSTIAN Wall advised.

## 2017-02-24 ENCOUNTER — APPOINTMENT (OUTPATIENT)
Dept: MEDICAL GROUP | Facility: MEDICAL CENTER | Age: 27
End: 2017-02-24
Payer: MEDICARE

## 2017-02-24 LAB
ANION GAP SERPL CALC-SCNC: 12 MMOL/L (ref 0–11.9)
BACTERIA UR CULT: ABNORMAL
BACTERIA UR CULT: ABNORMAL
BASOPHILS # BLD AUTO: 0.2 % (ref 0–1.8)
BASOPHILS # BLD: 0.01 K/UL (ref 0–0.12)
BUN SERPL-MCNC: 17 MG/DL (ref 8–22)
CALCIUM SERPL-MCNC: 9.2 MG/DL (ref 8.5–10.5)
CHLORIDE SERPL-SCNC: 101 MMOL/L (ref 96–112)
CHOLEST SERPL-MCNC: 216 MG/DL (ref 100–199)
CO2 SERPL-SCNC: 23 MMOL/L (ref 20–33)
CREAT SERPL-MCNC: 0.69 MG/DL (ref 0.5–1.4)
EOSINOPHIL # BLD AUTO: 0.01 K/UL (ref 0–0.51)
EOSINOPHIL NFR BLD: 0.2 % (ref 0–6.9)
ERYTHROCYTE [DISTWIDTH] IN BLOOD BY AUTOMATED COUNT: 44.2 FL (ref 35.9–50)
GFR SERPL CREATININE-BSD FRML MDRD: >60 ML/MIN/1.73 M 2
GLUCOSE BLD-MCNC: 294 MG/DL (ref 65–99)
GLUCOSE BLD-MCNC: 311 MG/DL (ref 65–99)
GLUCOSE BLD-MCNC: 450 MG/DL (ref 65–99)
GLUCOSE SERPL-MCNC: 320 MG/DL (ref 65–99)
HCT VFR BLD AUTO: 36.9 % (ref 37–47)
HDLC SERPL-MCNC: 61 MG/DL
HGB BLD-MCNC: 12.2 G/DL (ref 12–16)
IMM GRANULOCYTES # BLD AUTO: 0.02 K/UL (ref 0–0.11)
IMM GRANULOCYTES NFR BLD AUTO: 0.4 % (ref 0–0.9)
LDLC SERPL CALC-MCNC: 129 MG/DL
LYMPHOCYTES # BLD AUTO: 0.89 K/UL (ref 1–4.8)
LYMPHOCYTES NFR BLD: 16.3 % (ref 22–41)
MCH RBC QN AUTO: 29.8 PG (ref 27–33)
MCHC RBC AUTO-ENTMCNC: 33.1 G/DL (ref 33.6–35)
MCV RBC AUTO: 90 FL (ref 81.4–97.8)
MONOCYTES # BLD AUTO: 0.19 K/UL (ref 0–0.85)
MONOCYTES NFR BLD AUTO: 3.5 % (ref 0–13.4)
NEUTROPHILS # BLD AUTO: 4.35 K/UL (ref 2–7.15)
NEUTROPHILS NFR BLD: 79.4 % (ref 44–72)
NRBC # BLD AUTO: 0.02 K/UL
NRBC BLD AUTO-RTO: 0.4 /100 WBC
PLATELET # BLD AUTO: 216 K/UL (ref 164–446)
PMV BLD AUTO: 11.9 FL (ref 9–12.9)
POTASSIUM SERPL-SCNC: 4.7 MMOL/L (ref 3.6–5.5)
RBC # BLD AUTO: 4.1 M/UL (ref 4.2–5.4)
SIGNIFICANT IND 70042: ABNORMAL
SITE SITE: ABNORMAL
SODIUM SERPL-SCNC: 136 MMOL/L (ref 135–145)
SOURCE SOURCE: ABNORMAL
TRIGL SERPL-MCNC: 132 MG/DL (ref 0–149)
WBC # BLD AUTO: 5.5 K/UL (ref 4.8–10.8)

## 2017-02-24 PROCEDURE — 770006 HCHG ROOM/CARE - MED/SURG/GYN SEMI*

## 2017-02-24 PROCEDURE — A9270 NON-COVERED ITEM OR SERVICE: HCPCS | Performed by: INTERNAL MEDICINE

## 2017-02-24 PROCEDURE — 82962 GLUCOSE BLOOD TEST: CPT

## 2017-02-24 PROCEDURE — 36415 COLL VENOUS BLD VENIPUNCTURE: CPT

## 2017-02-24 PROCEDURE — 700111 HCHG RX REV CODE 636 W/ 250 OVERRIDE (IP): Performed by: INTERNAL MEDICINE

## 2017-02-24 PROCEDURE — 94640 AIRWAY INHALATION TREATMENT: CPT

## 2017-02-24 PROCEDURE — 700102 HCHG RX REV CODE 250 W/ 637 OVERRIDE(OP): Performed by: INTERNAL MEDICINE

## 2017-02-24 PROCEDURE — 80061 LIPID PANEL: CPT

## 2017-02-24 PROCEDURE — 80048 BASIC METABOLIC PNL TOTAL CA: CPT

## 2017-02-24 PROCEDURE — 99233 SBSQ HOSP IP/OBS HIGH 50: CPT | Performed by: INTERNAL MEDICINE

## 2017-02-24 PROCEDURE — 85025 COMPLETE CBC W/AUTO DIFF WBC: CPT

## 2017-02-24 PROCEDURE — 4A10X4Z MONITORING OF CENTRAL NERVOUS ELECTRICAL ACTIVITY, EXTERNAL APPROACH: ICD-10-PCS | Performed by: PSYCHIATRY & NEUROLOGY

## 2017-02-24 PROCEDURE — 700105 HCHG RX REV CODE 258: Performed by: INTERNAL MEDICINE

## 2017-02-24 PROCEDURE — 95951 EEG: CPT | Mod: 52

## 2017-02-24 PROCEDURE — 94760 N-INVAS EAR/PLS OXIMETRY 1: CPT

## 2017-02-24 RX ORDER — ALBUTEROL SULFATE 2.5 MG/3ML
2.5 SOLUTION RESPIRATORY (INHALATION)
Status: DISCONTINUED | OUTPATIENT
Start: 2017-02-24 | End: 2017-02-27 | Stop reason: HOSPADM

## 2017-02-24 RX ORDER — NITROFURANTOIN 25; 75 MG/1; MG/1
100 CAPSULE ORAL 2 TIMES DAILY WITH MEALS
Status: DISCONTINUED | OUTPATIENT
Start: 2017-02-25 | End: 2017-02-27 | Stop reason: HOSPADM

## 2017-02-24 RX ORDER — MORPHINE SULFATE 4 MG/ML
2-4 INJECTION, SOLUTION INTRAMUSCULAR; INTRAVENOUS EVERY 4 HOURS PRN
Status: DISCONTINUED | OUTPATIENT
Start: 2017-02-24 | End: 2017-02-25

## 2017-02-24 RX ORDER — PREDNISONE 50 MG/1
50 TABLET ORAL DAILY
Status: DISCONTINUED | OUTPATIENT
Start: 2017-02-25 | End: 2017-02-27 | Stop reason: HOSPADM

## 2017-02-24 RX ORDER — DEXTROSE MONOHYDRATE 25 G/50ML
25 INJECTION, SOLUTION INTRAVENOUS
Status: DISCONTINUED | OUTPATIENT
Start: 2017-02-24 | End: 2017-02-27 | Stop reason: HOSPADM

## 2017-02-24 RX ADMIN — MORPHINE SULFATE 2 MG: 4 INJECTION INTRAVENOUS at 12:48

## 2017-02-24 RX ADMIN — ASPIRIN 325 MG: 325 TABLET, COATED ORAL at 08:22

## 2017-02-24 RX ADMIN — FLUOXETINE 10 MG: 10 CAPSULE ORAL at 08:22

## 2017-02-24 RX ADMIN — ALBUTEROL SULFATE 2 PUFF: 90 AEROSOL, METERED RESPIRATORY (INHALATION) at 12:48

## 2017-02-24 RX ADMIN — ONDANSETRON 4 MG: 2 INJECTION, SOLUTION INTRAMUSCULAR; INTRAVENOUS at 08:30

## 2017-02-24 RX ADMIN — ONDANSETRON 4 MG: 2 INJECTION, SOLUTION INTRAMUSCULAR; INTRAVENOUS at 02:11

## 2017-02-24 RX ADMIN — LEVOTHYROXINE SODIUM 75 MCG: 75 TABLET ORAL at 05:01

## 2017-02-24 RX ADMIN — FUROSEMIDE 20 MG: 20 TABLET ORAL at 08:22

## 2017-02-24 RX ADMIN — CYANOCOBALAMIN TAB 500 MCG 1000 MCG: 500 TAB at 20:14

## 2017-02-24 RX ADMIN — SODIUM CHLORIDE 500 MG: 9 INJECTION, SOLUTION INTRAVENOUS at 20:14

## 2017-02-24 RX ADMIN — ATORVASTATIN CALCIUM 40 MG: 40 TABLET, FILM COATED ORAL at 20:14

## 2017-02-24 RX ADMIN — SODIUM BICARBONATE TAB 650 MG 650 MG: 650 TAB at 08:21

## 2017-02-24 RX ADMIN — CYCLOBENZAPRINE HYDROCHLORIDE 10 MG: 10 TABLET, FILM COATED ORAL at 20:14

## 2017-02-24 RX ADMIN — SODIUM CHLORIDE 500 MG: 9 INJECTION, SOLUTION INTRAVENOUS at 08:21

## 2017-02-24 RX ADMIN — INSULIN LISPRO 6 UNITS: 100 INJECTION, SOLUTION INTRAVENOUS; SUBCUTANEOUS at 14:01

## 2017-02-24 RX ADMIN — SODIUM CHLORIDE: 9 INJECTION, SOLUTION INTRAVENOUS at 14:03

## 2017-02-24 RX ADMIN — INSULIN LISPRO 6 UNITS: 100 INJECTION, SOLUTION INTRAVENOUS; SUBCUTANEOUS at 20:25

## 2017-02-24 RX ADMIN — MORPHINE SULFATE 4 MG: 4 INJECTION INTRAVENOUS at 16:13

## 2017-02-24 RX ADMIN — FAMOTIDINE 20 MG: 20 TABLET, FILM COATED ORAL at 08:22

## 2017-02-24 RX ADMIN — ERGOCALCIFEROL 50000 UNITS: 1.25 CAPSULE ORAL at 08:22

## 2017-02-24 RX ADMIN — CYCLOBENZAPRINE HYDROCHLORIDE 10 MG: 10 TABLET, FILM COATED ORAL at 08:22

## 2017-02-24 RX ADMIN — INSULIN LISPRO 5 UNITS: 100 INJECTION, SOLUTION INTRAVENOUS; SUBCUTANEOUS at 17:21

## 2017-02-24 RX ADMIN — SODIUM BICARBONATE TAB 650 MG 650 MG: 650 TAB at 20:14

## 2017-02-24 RX ADMIN — ONDANSETRON 4 MG: 2 INJECTION, SOLUTION INTRAMUSCULAR; INTRAVENOUS at 14:54

## 2017-02-24 RX ADMIN — FAMOTIDINE 20 MG: 20 TABLET, FILM COATED ORAL at 20:14

## 2017-02-24 RX ADMIN — GABAPENTIN 1200 MG: 600 TABLET ORAL at 10:00

## 2017-02-24 RX ADMIN — OXYCODONE HYDROCHLORIDE AND ACETAMINOPHEN 2 TABLET: 10; 325 TABLET ORAL at 23:25

## 2017-02-24 RX ADMIN — OMEPRAZOLE 20 MG: 20 CAPSULE, DELAYED RELEASE ORAL at 08:22

## 2017-02-24 RX ADMIN — STANDARDIZED SENNA CONCENTRATE AND DOCUSATE SODIUM 1 TABLET: 8.6; 5 TABLET, FILM COATED ORAL at 14:02

## 2017-02-24 RX ADMIN — OXYCODONE HYDROCHLORIDE AND ACETAMINOPHEN 2 TABLET: 10; 325 TABLET ORAL at 11:23

## 2017-02-24 RX ADMIN — HEPARIN SODIUM 5000 UNITS: 5000 INJECTION, SOLUTION INTRAVENOUS; SUBCUTANEOUS at 14:02

## 2017-02-24 RX ADMIN — LACTULOSE 15 ML: 10 SOLUTION ORAL at 13:59

## 2017-02-24 RX ADMIN — BISACODYL 10 MG: 10 SUPPOSITORY RECTAL at 17:18

## 2017-02-24 RX ADMIN — MORPHINE SULFATE 2 MG: 4 INJECTION INTRAVENOUS at 12:31

## 2017-02-24 RX ADMIN — STANDARDIZED SENNA CONCENTRATE AND DOCUSATE SODIUM 1 TABLET: 8.6; 5 TABLET, FILM COATED ORAL at 20:15

## 2017-02-24 RX ADMIN — OXYCODONE HYDROCHLORIDE AND ACETAMINOPHEN 2 TABLET: 10; 325 TABLET ORAL at 05:01

## 2017-02-24 RX ADMIN — OMEPRAZOLE 20 MG: 20 CAPSULE, DELAYED RELEASE ORAL at 17:22

## 2017-02-24 RX ADMIN — LACTULOSE 15 ML: 10 SOLUTION ORAL at 08:31

## 2017-02-24 RX ADMIN — HEPARIN SODIUM 5000 UNITS: 5000 INJECTION, SOLUTION INTRAVENOUS; SUBCUTANEOUS at 20:14

## 2017-02-24 RX ADMIN — OXYCODONE HYDROCHLORIDE AND ACETAMINOPHEN 2 TABLET: 10; 325 TABLET ORAL at 17:21

## 2017-02-24 RX ADMIN — ZIPRASIDONE HYDROCHLORIDE 160 MG: 80 CAPSULE ORAL at 17:21

## 2017-02-24 RX ADMIN — MORPHINE SULFATE 4 MG: 4 INJECTION INTRAVENOUS at 20:15

## 2017-02-24 RX ADMIN — HEPARIN SODIUM 5000 UNITS: 5000 INJECTION, SOLUTION INTRAVENOUS; SUBCUTANEOUS at 05:01

## 2017-02-24 RX ADMIN — ALBUTEROL SULFATE 2 PUFF: 90 AEROSOL, METERED RESPIRATORY (INHALATION) at 02:14

## 2017-02-24 RX ADMIN — SODIUM BICARBONATE TAB 650 MG 650 MG: 650 TAB at 14:03

## 2017-02-24 RX ADMIN — CYANOCOBALAMIN TAB 500 MCG 1000 MCG: 500 TAB at 08:22

## 2017-02-24 ASSESSMENT — PAIN SCALES - GENERAL
PAINLEVEL_OUTOF10: 7
PAINLEVEL_OUTOF10: 9
PAINLEVEL_OUTOF10: 9
PAINLEVEL_OUTOF10: 7
PAINLEVEL_OUTOF10: 7
PAINLEVEL_OUTOF10: 5
PAINLEVEL_OUTOF10: 9
PAINLEVEL_OUTOF10: ASSUMED PAIN PRESENT
PAINLEVEL_OUTOF10: 7
PAINLEVEL_OUTOF10: 7

## 2017-02-24 ASSESSMENT — ENCOUNTER SYMPTOMS
DIARRHEA: 0
BLURRED VISION: 0
DIZZINESS: 0
WHEEZING: 0
LOSS OF CONSCIOUSNESS: 0
FEVER: 0
CHILLS: 0
ABDOMINAL PAIN: 0
FOCAL WEAKNESS: 1
DOUBLE VISION: 0
SENSORY CHANGE: 1
TINGLING: 1
COUGH: 0
BACK PAIN: 1
SHORTNESS OF BREATH: 0
HEADACHES: 0

## 2017-02-24 NOTE — FLOWSHEET NOTE
02/24/17 0539   Education   Education Yes - Pt. / Family has been Instructed in use of Respiratory Medications and Adverse Reactions   RT Assessment of Delivered Medications   Evaluation of Medication Delivery Daily Yes-- Pt /Family has been Instructed in use of Respiratory Medications and Adverse Reactions   SVN Group   #SVN Performed Yes   Given By: Mouthpiece   Date SVN Last Changed 02/24/17   Date SVN Next Change Due (Q 7 Days) 03/03/17   Chest Exam   Respiration 20   Pulse 93   Breath Sounds   RUL Breath Sounds Diminished   RML Breath Sounds Diminished   RLL Breath Sounds Diminished   MARY Breath Sounds Diminished   LLL Breath Sounds Diminished   Oxygen   Pulse Oximetry 96 %   O2 (LPM) 3   O2 Daily Delivery Respiratory  Nasal Cannula   PRN tx per patient request, SOB.

## 2017-02-24 NOTE — PROGRESS NOTES
Informed pt only 2 tabs of home med Corlanor. Pt states will have family bring more medication to hospital for pharmacy to verify.

## 2017-02-24 NOTE — PROGRESS NOTES
Pt c/o SOB, has been on 2L O2 since previous shift, sats mid 90s. Pt still c/o SOB s/p albuterol inhaler given, 91%.    Paged Dr. Calhoun, updated about pt's SOB s/p inhaler, telephone order with read back for RT protocol.      IS given to pt, pt reaching 1000-1500ml.

## 2017-02-24 NOTE — PROGRESS NOTES
Hospital Medicine Progress Note, Adult, Complex               Author: Sreekanth Martínez Date & Time created: 2/23/2017  6:35 PM     Interval History:  26 yr old female with multiple recurrent paraparesis, concern for mitochondrial disease being evaluated at [facility], urinary retention s/p supra pubic catheter placement, psych disorder and conversion disorder, chronic pain syndrome who presents with bilateral lower extremity weakness and right facial numbness and right arm numbness. Seen by  and Rowdy and referred to  for further evaluation. Had recent muscle biopsy for suspected mitochondrial disease which revealed: Type 2 fiber atrophy, mild. Patient has has responded to high dose steroids during her previous admissions    2/23 Patient has persistent weakness and numbness. Patient is demanding to have her home dose of lasix for swelling of her lower extremities, however no pitting edema noted on exam. Patient demanding to restart her high dose extended release gabapentin. CT head negative. Unable to do MRI because patient has gastric stimulator. Started on aspirin and statin daily. MRI of brain last year revealed no abnormality, cannot repeat due to gastric stimulator. Will start on pulse steroids and assess response.      Review of Systems:  Review of Systems   Constitutional: Negative for fever and chills.   HENT: Negative for hearing loss.    Eyes: Negative for blurred vision and double vision.   Respiratory: Negative for cough, shortness of breath and wheezing.    Cardiovascular: Negative for chest pain and leg swelling.   Gastrointestinal: Negative for abdominal pain and diarrhea.   Genitourinary: Positive for dysuria.   Musculoskeletal: Positive for back pain.   Neurological: Positive for tingling, sensory change and focal weakness. Negative for dizziness, loss of consciousness and headaches.       Physical Exam:  Physical Exam   Constitutional: She is oriented to person, place, and time. She appears  well-developed. No distress.   HENT:   Head: Atraumatic.   Eyes: Conjunctivae are normal.   Neck: Neck supple.   Cardiovascular: Normal rate and regular rhythm.    Pulmonary/Chest: No respiratory distress. She has no wheezes.   Abdominal: Soft. Bowel sounds are normal. She exhibits no distension. There is no tenderness.   Musculoskeletal: She exhibits no edema or tenderness.   Neurological: She is alert and oriented to person, place, and time.   B/l lower extremity weakness  Right upper extremity weakness   Skin: Skin is warm and dry.   Psychiatric: She has a normal mood and affect. Her behavior is normal.   Nursing note and vitals reviewed.      Labs:        Invalid input(s): [id number]  Recent Labs      17   1006   CPKTOTAL  45   TROPONINI  <0.01   BNPBTYPENAT  20     Recent Labs      17   1006  17   0220   SODIUM  136  133*   POTASSIUM  3.7  4.9   CHLORIDE  103  101   CO2  22  19*   BUN  8  14   CREATININE  0.55  0.77   CALCIUM  9.4  9.2     Recent Labs      17   1006  17   0220   ALTSGPT  107*  110*   ASTSGOT  64*  69*   ALKPHOSPHAT  77  77   TBILIRUBIN  0.6  0.6   LIPASE  28   --    GLUCOSE  134*  277*     Recent Labs      17   1006  17   0220   RBC  4.22  4.32   HEMOGLOBIN  12.3  12.9   HEMATOCRIT  38.0  38.5   PLATELETCT  204  209   PROTHROMBTM  13.3   --    APTT  26.5   --    INR  0.98   --      Recent Labs      17   1006  17   0220   WBC  4.1*  5.0   NEUTSPOLYS  33.20*   --    LYMPHOCYTES  49.40*   --    MONOCYTES  9.20   --    EOSINOPHILS  7.50*   --    BASOPHILS  0.50   --    ASTSGOT  64*  69*   ALTSGPT  107*  110*   ALKPHOSPHAT  77  77   TBILIRUBIN  0.6  0.6           Hemodynamics:  Temp (24hrs), Av.3 °C (97.3 °F), Min:35.9 °C (96.7 °F), Max:36.6 °C (97.8 °F)  Temperature: 36.4 °C (97.5 °F)  Pulse  Av.1  Min: 85  Max: 104   Blood Pressure: 127/75 mmHg     Respiratory:    Respiration: 17, Pulse Oximetry: 94 %            Fluids:    Intake/Output Summary (Last 24 hours) at 02/23/17 1835  Last data filed at 02/23/17 0629   Gross per 24 hour   Intake      0 ml   Output   1000 ml   Net  -1000 ml        GI/Nutrition:  Orders Placed This Encounter   Procedures   • Diet Order     Standing Status: Standing      Number of Occurrences: 1      Standing Expiration Date:      Order Specific Question:  Diet:     Answer:  Regular [1]     CT head w/o  Lateral ventricles are normal in size and symmetric.  Cortical sulci are within normal limits.  No significant mass effect or midline shift.  Basal cisterns are patent.  No evidence for intracranial hemorrhage.  Calvaria are intact.  Visualized orbits are unremarkable.  Visualized mastoid air cells are clear.  Visualized paranasal sinuses are unremarkable.      Medical Decision Making, by Problem:  Active Hospital Problems    Diagnosis   • • Leg weakness, bilateral [M62.81]   -unknown etiology, multiple admissions in past which responded well to steroids  -questinable mitochondrial disease   -will start IV Solumedrol 500 mg BID and assess response  -will consider NEurology consult if no improvement  -MRI cannot be done due to gastric stimulator  -start on aspirin and statin for concern of stroke, though less likely  -PT/OT    HTN (hypertension) [I10]  -well controlled  -continue home regimen   • Chronic pain syndrome [G89.4]  -stable  continue home regimen   • Psychosis, schizophrenia, simple (HCC) [F20.89]  continue Geodon   • Hypothyroidism [E03.9]  -stable  -check TSH, continue synthroid 75 mcg daily   • Weakness of right upper extremity [M62.81]  -as above       • Chronic suprapubic catheter (CMS-HCC) [Z93.59]  -patient complaining of foul smelling urine  -check UA   • 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]   • GERD (gastroesophageal reflux disease) [K21.9]  -continue pepcid   • Morbidly obese (CMS-HCC) [E66.01]   • Peripheral neuropathy (CMS-[facility]) [G62.9]  continue gabapentin       I spent a total of 45  minutes during this clinical encounter of which > 50% was devoted to counseling and coordinating care including review of records, pertinent lab data and studies, as well as discussing diagnostic evaluation and work up, planned therapeutic interventions and future disposition of care. Where indicated, the assessment and plan reflect discussion of patient with other healthcare providers, family members, and additional research needed to obtain further information in formulating the plan of care of this patient.       Labs reviewed, Medications reviewed and Radiology images reviewed        DVT Prophylaxis: Heparin        Assessed for rehab: Patient was assess for and/or received rehabilitation services during this hospitalization

## 2017-02-24 NOTE — PROGRESS NOTES
Paged Dr Reno to notify that [facility] reported they wouldn't have the supplies to do the SPECT test until about Tuesday or Wednesday, but they can do it outpt if getting d/c'd before then.

## 2017-02-24 NOTE — PROGRESS NOTES
Pt A&Ox4. Pt becomes irritated with neuro checks, needs much encouragement to cooperate, N/T reported to BUE, BLE, face, RUE, and only right side of face. Pt c/o generalized pain, focused to hip, back, and right knee, cold packs given, scheduled Percocet. BLE and RUE weakness noted, drift to RUE. Suprapubic cath site pink and red, crusty, site cleansed, hazy yellow urine noted. Unable to ambulate pt at this time, 2 assist.

## 2017-02-24 NOTE — CARE PLAN
Problem: Skin Integrity  Goal: Risk for impaired skin integrity will decrease  Outcome: PROGRESSING AS EXPECTED  Encouraging mobilization and turning every 2 hours. Educated about preventing skin break down and pt verbalizes understanding. Repositioned with pillows and draw sheet every 2 hours. Pt declines turns attimes. Heels floated with pillows. Suprapubic site red, crusting, washed with soap and water.     Problem: Mobility  Goal: Risk for activity intolerance will decrease  Outcome: NOT MET  Pt unable to ambulate   PT/OT involved

## 2017-02-24 NOTE — DISCHARGE PLANNING
SW provided SNAP information and application as requested. Pt may need home O2, SW will continue to monitor and follow up. [employer] is preferred DME provider if home O2 is needed.

## 2017-02-24 NOTE — PROGRESS NOTES
Received call from Arianna in MRI to schedule. Informed Arianna per Dr. Martínez's note, pt has gastric stimulator and cannot have repeat MRI. Cancelled MRI.

## 2017-02-25 LAB
25(OH)D3 SERPL-MCNC: 26 NG/ML (ref 30–100)
ALBUMIN SERPL BCP-MCNC: 3.7 G/DL (ref 3.2–4.9)
ALBUMIN/GLOB SERPL: 1.5 G/DL
ALP SERPL-CCNC: 57 U/L (ref 30–99)
ALT SERPL-CCNC: 71 U/L (ref 2–50)
ANION GAP SERPL CALC-SCNC: 12 MMOL/L (ref 0–11.9)
AST SERPL-CCNC: 22 U/L (ref 12–45)
BILIRUB SERPL-MCNC: 0.4 MG/DL (ref 0.1–1.5)
BUN SERPL-MCNC: 20 MG/DL (ref 8–22)
CALCIUM SERPL-MCNC: 9.2 MG/DL (ref 8.5–10.5)
CHLORIDE SERPL-SCNC: 100 MMOL/L (ref 96–112)
CO2 SERPL-SCNC: 25 MMOL/L (ref 20–33)
CREAT SERPL-MCNC: 0.7 MG/DL (ref 0.5–1.4)
CRP SERPL HS-MCNC: 0.29 MG/DL (ref 0–0.75)
ERYTHROCYTE [DISTWIDTH] IN BLOOD BY AUTOMATED COUNT: 43.7 FL (ref 35.9–50)
ERYTHROCYTE [SEDIMENTATION RATE] IN BLOOD BY WESTERGREN METHOD: 11 MM/HOUR (ref 0–20)
GFR SERPL CREATININE-BSD FRML MDRD: >60 ML/MIN/1.73 M 2
GLOBULIN SER CALC-MCNC: 2.5 G/DL (ref 1.9–3.5)
GLUCOSE BLD-MCNC: 142 MG/DL (ref 65–99)
GLUCOSE BLD-MCNC: 210 MG/DL (ref 65–99)
GLUCOSE BLD-MCNC: 214 MG/DL (ref 65–99)
GLUCOSE BLD-MCNC: 218 MG/DL (ref 65–99)
GLUCOSE SERPL-MCNC: 213 MG/DL (ref 65–99)
HCT VFR BLD AUTO: 36.7 % (ref 37–47)
HGB BLD-MCNC: 12.2 G/DL (ref 12–16)
MAGNESIUM SERPL-MCNC: 2.2 MG/DL (ref 1.5–2.5)
MCH RBC QN AUTO: 29.8 PG (ref 27–33)
MCHC RBC AUTO-ENTMCNC: 33.2 G/DL (ref 33.6–35)
MCV RBC AUTO: 89.5 FL (ref 81.4–97.8)
PHOSPHATE SERPL-MCNC: 3 MG/DL (ref 2.5–4.5)
PLATELET # BLD AUTO: 197 K/UL (ref 164–446)
PMV BLD AUTO: 11.9 FL (ref 9–12.9)
POTASSIUM SERPL-SCNC: 4 MMOL/L (ref 3.6–5.5)
PROT SERPL-MCNC: 6.2 G/DL (ref 6–8.2)
RBC # BLD AUTO: 4.1 M/UL (ref 4.2–5.4)
SODIUM SERPL-SCNC: 137 MMOL/L (ref 135–145)
THYROPEROXIDASE AB SERPL-ACNC: 93.5 IU/ML (ref 0–9)
VIT B12 SERPL-MCNC: >1500 PG/ML (ref 211–911)
WBC # BLD AUTO: 5.1 K/UL (ref 4.8–10.8)

## 2017-02-25 PROCEDURE — 86140 C-REACTIVE PROTEIN: CPT

## 2017-02-25 PROCEDURE — 99232 SBSQ HOSP IP/OBS MODERATE 35: CPT | Performed by: INTERNAL MEDICINE

## 2017-02-25 PROCEDURE — 700102 HCHG RX REV CODE 250 W/ 637 OVERRIDE(OP): Performed by: INTERNAL MEDICINE

## 2017-02-25 PROCEDURE — 82607 VITAMIN B-12: CPT

## 2017-02-25 PROCEDURE — 84100 ASSAY OF PHOSPHORUS: CPT

## 2017-02-25 PROCEDURE — 36415 COLL VENOUS BLD VENIPUNCTURE: CPT

## 2017-02-25 PROCEDURE — 85652 RBC SED RATE AUTOMATED: CPT

## 2017-02-25 PROCEDURE — A9270 NON-COVERED ITEM OR SERVICE: HCPCS | Performed by: INTERNAL MEDICINE

## 2017-02-25 PROCEDURE — 700111 HCHG RX REV CODE 636 W/ 250 OVERRIDE (IP): Performed by: INTERNAL MEDICINE

## 2017-02-25 PROCEDURE — 86038 ANTINUCLEAR ANTIBODIES: CPT

## 2017-02-25 PROCEDURE — 83735 ASSAY OF MAGNESIUM: CPT

## 2017-02-25 PROCEDURE — 80053 COMPREHEN METABOLIC PANEL: CPT

## 2017-02-25 PROCEDURE — 86255 FLUORESCENT ANTIBODY SCREEN: CPT

## 2017-02-25 PROCEDURE — 86376 MICROSOMAL ANTIBODY EACH: CPT

## 2017-02-25 PROCEDURE — 770006 HCHG ROOM/CARE - MED/SURG/GYN SEMI*

## 2017-02-25 PROCEDURE — 86235 NUCLEAR ANTIGEN ANTIBODY: CPT

## 2017-02-25 PROCEDURE — 86800 THYROGLOBULIN ANTIBODY: CPT

## 2017-02-25 PROCEDURE — 82595 ASSAY OF CRYOGLOBULIN: CPT

## 2017-02-25 PROCEDURE — 86225 DNA ANTIBODY NATIVE: CPT

## 2017-02-25 PROCEDURE — 86147 CARDIOLIPIN ANTIBODY EA IG: CPT | Mod: 91

## 2017-02-25 PROCEDURE — 82306 VITAMIN D 25 HYDROXY: CPT

## 2017-02-25 PROCEDURE — 82962 GLUCOSE BLOOD TEST: CPT | Mod: 91

## 2017-02-25 PROCEDURE — 85027 COMPLETE CBC AUTOMATED: CPT

## 2017-02-25 RX ORDER — MORPHINE SULFATE 4 MG/ML
4 INJECTION, SOLUTION INTRAMUSCULAR; INTRAVENOUS
Status: DISCONTINUED | OUTPATIENT
Start: 2017-02-25 | End: 2017-02-27 | Stop reason: HOSPADM

## 2017-02-25 RX ADMIN — MORPHINE SULFATE 4 MG: 4 INJECTION INTRAVENOUS at 06:05

## 2017-02-25 RX ADMIN — ALBUTEROL SULFATE 2 PUFF: 90 AEROSOL, METERED RESPIRATORY (INHALATION) at 03:43

## 2017-02-25 RX ADMIN — LEVOTHYROXINE SODIUM 75 MCG: 75 TABLET ORAL at 05:19

## 2017-02-25 RX ADMIN — HEPARIN SODIUM 5000 UNITS: 5000 INJECTION, SOLUTION INTRAVENOUS; SUBCUTANEOUS at 15:50

## 2017-02-25 RX ADMIN — ONDANSETRON 4 MG: 2 INJECTION, SOLUTION INTRAMUSCULAR; INTRAVENOUS at 08:33

## 2017-02-25 RX ADMIN — OMEPRAZOLE 20 MG: 20 CAPSULE, DELAYED RELEASE ORAL at 08:32

## 2017-02-25 RX ADMIN — SODIUM BICARBONATE TAB 650 MG 650 MG: 650 TAB at 15:55

## 2017-02-25 RX ADMIN — OXYCODONE HYDROCHLORIDE AND ACETAMINOPHEN 2 TABLET: 10; 325 TABLET ORAL at 05:18

## 2017-02-25 RX ADMIN — MORPHINE SULFATE 2 MG: 4 INJECTION INTRAVENOUS at 02:08

## 2017-02-25 RX ADMIN — FLUOXETINE 10 MG: 10 CAPSULE ORAL at 11:30

## 2017-02-25 RX ADMIN — ASPIRIN 325 MG: 325 TABLET, COATED ORAL at 08:32

## 2017-02-25 RX ADMIN — GABAPENTIN 1200 MG: 600 TABLET ORAL at 10:00

## 2017-02-25 RX ADMIN — OXYCODONE HYDROCHLORIDE AND ACETAMINOPHEN 2 TABLET: 10; 325 TABLET ORAL at 23:43

## 2017-02-25 RX ADMIN — MORPHINE SULFATE 4 MG: 4 INJECTION INTRAVENOUS at 13:29

## 2017-02-25 RX ADMIN — PREDNISONE 50 MG: 50 TABLET ORAL at 08:32

## 2017-02-25 RX ADMIN — SODIUM BICARBONATE TAB 650 MG 650 MG: 650 TAB at 23:44

## 2017-02-25 RX ADMIN — NITROFURANTOIN MONOHYDRATE AND NITROFURANTOIN MACROCRYSTALLINE 100 MG: 75; 25 CAPSULE ORAL at 17:25

## 2017-02-25 RX ADMIN — ATORVASTATIN CALCIUM 40 MG: 40 TABLET, FILM COATED ORAL at 20:32

## 2017-02-25 RX ADMIN — CYCLOBENZAPRINE HYDROCHLORIDE 10 MG: 10 TABLET, FILM COATED ORAL at 20:32

## 2017-02-25 RX ADMIN — HEPARIN SODIUM 5000 UNITS: 5000 INJECTION, SOLUTION INTRAVENOUS; SUBCUTANEOUS at 20:31

## 2017-02-25 RX ADMIN — INSULIN LISPRO 3 UNITS: 100 INJECTION, SOLUTION INTRAVENOUS; SUBCUTANEOUS at 17:28

## 2017-02-25 RX ADMIN — MORPHINE SULFATE 4 MG: 4 INJECTION INTRAVENOUS at 20:31

## 2017-02-25 RX ADMIN — FUROSEMIDE 20 MG: 20 TABLET ORAL at 08:32

## 2017-02-25 RX ADMIN — CYCLOBENZAPRINE HYDROCHLORIDE 10 MG: 10 TABLET, FILM COATED ORAL at 08:32

## 2017-02-25 RX ADMIN — ZIPRASIDONE HYDROCHLORIDE 160 MG: 80 CAPSULE ORAL at 17:25

## 2017-02-25 RX ADMIN — FAMOTIDINE 20 MG: 20 TABLET, FILM COATED ORAL at 20:32

## 2017-02-25 RX ADMIN — SODIUM BICARBONATE TAB 650 MG 650 MG: 650 TAB at 08:32

## 2017-02-25 RX ADMIN — NITROFURANTOIN MONOHYDRATE AND NITROFURANTOIN MACROCRYSTALLINE 100 MG: 75; 25 CAPSULE ORAL at 08:33

## 2017-02-25 RX ADMIN — OXYCODONE HYDROCHLORIDE AND ACETAMINOPHEN 2 TABLET: 10; 325 TABLET ORAL at 11:30

## 2017-02-25 RX ADMIN — STANDARDIZED SENNA CONCENTRATE AND DOCUSATE SODIUM 1 TABLET: 8.6; 5 TABLET, FILM COATED ORAL at 20:33

## 2017-02-25 RX ADMIN — INSULIN LISPRO 3 UNITS: 100 INJECTION, SOLUTION INTRAVENOUS; SUBCUTANEOUS at 11:33

## 2017-02-25 RX ADMIN — FENTANYL 1 PATCH: 25 PATCH TRANSDERMAL at 15:50

## 2017-02-25 RX ADMIN — GABAPENTIN 600 MG: 600 TABLET ORAL at 01:00

## 2017-02-25 RX ADMIN — INSULIN LISPRO 3 UNITS: 100 INJECTION, SOLUTION INTRAVENOUS; SUBCUTANEOUS at 06:10

## 2017-02-25 RX ADMIN — OXYCODONE HYDROCHLORIDE AND ACETAMINOPHEN 2 TABLET: 10; 325 TABLET ORAL at 17:25

## 2017-02-25 RX ADMIN — CYANOCOBALAMIN TAB 500 MCG 1000 MCG: 500 TAB at 08:32

## 2017-02-25 RX ADMIN — FAMOTIDINE 20 MG: 20 TABLET, FILM COATED ORAL at 08:32

## 2017-02-25 RX ADMIN — CYANOCOBALAMIN TAB 500 MCG 1000 MCG: 500 TAB at 20:32

## 2017-02-25 RX ADMIN — HEPARIN SODIUM 5000 UNITS: 5000 INJECTION, SOLUTION INTRAVENOUS; SUBCUTANEOUS at 05:18

## 2017-02-25 RX ADMIN — OMEPRAZOLE 20 MG: 20 CAPSULE, DELAYED RELEASE ORAL at 17:25

## 2017-02-25 RX ADMIN — MORPHINE SULFATE 4 MG: 4 INJECTION INTRAVENOUS at 10:08

## 2017-02-25 RX ADMIN — ONDANSETRON 4 MG: 2 INJECTION, SOLUTION INTRAMUSCULAR; INTRAVENOUS at 03:06

## 2017-02-25 ASSESSMENT — PAIN SCALES - GENERAL
PAINLEVEL_OUTOF10: 7
PAINLEVEL_OUTOF10: 7
PAINLEVEL_OUTOF10: 9
PAINLEVEL_OUTOF10: 7
PAINLEVEL_OUTOF10: 10
PAINLEVEL_OUTOF10: 7
PAINLEVEL_OUTOF10: 8
PAINLEVEL_OUTOF10: 9
PAINLEVEL_OUTOF10: 9
PAINLEVEL_OUTOF10: 10

## 2017-02-25 ASSESSMENT — ENCOUNTER SYMPTOMS
FEVER: 0
LOSS OF CONSCIOUSNESS: 0
SHORTNESS OF BREATH: 0
WHEEZING: 0
BACK PAIN: 1
SENSORY CHANGE: 1
DIZZINESS: 0
COUGH: 0
ABDOMINAL PAIN: 0
BLURRED VISION: 0
DIARRHEA: 0
DOUBLE VISION: 0
HEADACHES: 0
FOCAL WEAKNESS: 1
CHILLS: 0
TINGLING: 1

## 2017-02-25 NOTE — PROGRESS NOTES
Pt ambulated multiple times to bathroom with 1 assist FWW, tremors noted to BUE, able to grasp FWW with RUE. Passing gas, +small hard BM. Pt is 2 assist while laying in bed as she reports she cannot move her legs or right arm while laying down.

## 2017-02-25 NOTE — PROGRESS NOTES
Dr Reno return phone call to discuss SPECT test.  MD reports to keep pt here til Tuesday if needed, to get the test completed here.

## 2017-02-25 NOTE — CARE PLAN
Problem: Mobility  Goal: Risk for activity intolerance will decrease  Outcome: PROGRESSING AS EXPECTED  Encouraging pt to continue to ambulate and get out of bed     Problem: Respiratory:  Goal: Respiratory status will improve  Outcome: PROGRESSING AS EXPECTED  Pt on and off oxygen   Sats mid 90s on RA while awake   CPAP in place, brought in from home     Pt denies SOB this evening

## 2017-02-25 NOTE — PROGRESS NOTES
Pt refusing bed alarm. Educated about preventing falls and importance of safety, pt verbalizes understanding.

## 2017-02-25 NOTE — EEG PROGRESS NOTE
EEG 02/24/2017 10:02 PM    Nonspecific non-localizing mild cortical dysfunction  Metabolic, toxic, adverse effects of medicine or even post-ictal      Of note, unremarkable EEG does not completely exclude the diagnosis  of seizures since seizure is an episodic phenomena.  Clinical correlation may help     If clinical suspicion of seizure remains high.  Prolonged outpatient EEG   monitoring may be of help.    Jennifer Reno M.D.  NEUROLOGIST  [facility]  10:11 PM02/24/2017

## 2017-02-25 NOTE — CARE PLAN
Problem: Pain Management  Goal: Pain level will decrease to patient’s comfort goal  Outcome: PROGRESSING AS EXPECTED  Pt reporting increased pain today.  MD ordered IV morphine PRN, given per orders prior to pt OOB.  Pt reported tolerable pain mgt with added morphine.  Heat packs applied frequently to right knee, ice packs to left hip, head & neck.  Pt OOB to chair for an hour today & 2 additional times up to BSC.  No BM today, bowel protocol in place & given, +flatus.  T&Pq2h with 2 assist.  BLE & RUE 2/5 strength.  Pillows in place and heels floated.  Nausea reported x2 today, though unaffecting pt's appetite.  SpO2 checked after pt on RA to BSC and back to bed, 93% on RA after exertion to BSC.  Pt's family has brought in own cpap for use tonight.

## 2017-02-25 NOTE — PROCEDURES
DATE OF SERVICE:  2/24/2017    The patient has recurrence of right arm weakness and parapelgia and   responded to steroids.  She has previous 6 episodes of losing strength in the   Right limbs and history of chronic pain syndrome ,spinal stimulator, psychosis,   anxiety, hyperthyroidism, polycystic ovarian disease and post muscle biopsy.    The EEG was done with a [employer] digital EEG system.    Twenty One electrodes were applied based on the International 10-20 system.    The EEG was reviewed with technique of montage Reformatting including referential and bipolar montage.    The EEG background consists of posterior delta rhythm around 8-9 Hz.  There are   low amplitude delta slowing over the bilateral hemisphere and not localizing   very well, not focal.  There are no definite epileptiform discharges.    There are no definite electrical seizures.  Stage I sleep was obtained.    INTERPRETATION:  This EEG denotes of a nonspecific nonlocalized mild   degree, nonspecific cortical dysfunction.  Clinical correlation may help.      Advise prolonged selective inpatient video EEG monitoring ( will discuss with patient's neurologist Dr Fox)     ____________________________________     MD GLENROY YU / CHRISTELLE    DD:  02/24/2017 22:13:13  DT:  02/24/2017 22:25:11    D#:  [id number]  Job#:  [id number]

## 2017-02-25 NOTE — PROGRESS NOTES
"1930 Discussed with Dr. Meehan of pt's urine culture results of E. Faecalis, suggested antibiotics.     Pt A&Ox4, c/o 9/10 \"all over pain\", demanding IV morphine, reports unable to move her right arm and requesting RN to move right arm for \"the morphine to fully get to my veins\". Pt able to [place]. Pt neuro checks continues to be inconsistent, pt c/o N/T and weakness changing in BUE and BLE. Pt requested bed bath, reported unable to ambulate to shower and unable to move in bed. Then pt was able to get up to BSC with 2 assist, unable to have BM, +flatus, BSx4 normoactive. Pt was then able to ambulate up to bathroom with CNA, 1 assist with FWW, fatigued. Pt tolerating diet, reports episodes of feeling nauseated although continues to eat snacks brought in from home. RN changed diet from regular to ADA.   "

## 2017-02-25 NOTE — PROGRESS NOTES
"DR Fox know this patient well      ________________________________________________________________________      Although psychogenic is likely  Autoimmune induced frontal lobe problems are possible too-- such as Hashimoto Encephalitis  Hashimoto Encephalitis could produce stroke like episodes and paraplegia  Moreover, Psychiatric problems stem from frontal lobe and there is a gray zone between frontal lobe dysfunction and \"psychiatric problems\"  ________________________________________________________________________          Results for DE LA CRUZ HARLEY AZIZA (MRN [id number]) as of 2/24/2017 21:51    Ref. Range  6/28/2015 14:07  3/7/2016 01:44  1/26/2017 02:38    Microsomal -Tpo- Abs  Latest Ref Range: 0.0-9.0 IU/mL  351.8 (H)  111.4 (H)  133.2 (H)            Recurrent stroke like symptoms and recurrent paraplegia ( responded to steroid)    ________________________________________________________________________      Prolonged video EEG might be of help -- looking for frontal lobe dysfunction-- I could provide this for the patient   SPECT scan might be of help- looking for objective cognitive dysfunction  Repeat LP might be of help-- I could offer that     ________________________________________________________________________      Advise IV solumedrol  3 days or 5 days  "

## 2017-02-25 NOTE — CARE PLAN
Problem: Mobility  Goal: Risk for activity intolerance will decrease  Outcome: PROGRESSING SLOWER THAN EXPECTED  Patient encouraged to ambulate to bathroom , get up to chair for all meals, and turn in bed Q2 hours.

## 2017-02-25 NOTE — CONSULTS
"NEUROLOGY NOTE    Referring Physician  Herbert Casas M.D.      CHIEF COMPLAINT:  Another right hemiplegia and parapelgia  Recurrent spells -- tended to respond to steroid  Was regarded as psychogenic by many doctors  Chief Complaint   Patient presents with   • Weakness     bilateral leg and right arm   • Numbness     right face         IMPRESSION    1. Recurrent Right hemiplegia and Paraplegia ----Whole Spine MRI was interpreted as not remarkable in the past and Brain CT was not remarkable either -  2. Hx of Myelitis? ( not all myelitis will show up in MRI of spine) or medical myelitis such as Vit B12 deficiency?  3. Psychiatric issues may play some roles  4. Severe claustrophobia  5. Microsomal antibody positive-- sign of autoimmune disease- such as Hashimoto's Thyroiditis, Hashimoto's Encephalitis etc.... Steroid will help       DR Fox know this patient well      ________________________________________________________________________      Although psychogenic is likely  Autoimmune induced frontal lobe problems are possible too-- such as Hashimoto Encephalitis  Hashimoto Encephalitis could produce stroke like episodes and paraplegia  Moreover, Psychiatric problems stem from frontal lobe and there is a gray zone between frontal lobe dysfunction and \"psychiatric problems\"  ________________________________________________________________________               Recurrent stroke like symptoms and recurrent paraplegia ( responded to steroid)    ________________________________________________________________________    I will discuss with patient's neurologist DR Fox for future planning    Prolonged video EEG might be of help -- looking for frontal lobe dysfunction-- I could provide this for the patient    SPECT scan might be of help- looking for objective cognitive dysfunction in functional studies  Repeat LP might be of help-- I could offer that "     ________________________________________________________________________      Advise IV solumedrol  3 days or 5 days            SIGNATURE:  Jennifer Reno      CC:  Torres Brody M.D.    Results for HARLEY DE LA CRUZ (MRN [id number]) as of 2/24/2017 21:51   Ref. Range 6/28/2015 14:07 3/7/2016 01:44 1/26/2017 02:38   Microsomal -Tpo- Abs Latest Ref Range: 0.0-9.0 IU/mL 351.8 (H) 111.4 (H) 133.2 (H)           PRESENT ILLNESS:   Another right hemiplegia and parapelgia  Recurrent spells -- tended to respond to steroid  Was regarded as psychogenic by many doctors    She is a 26-year-old female, initially came to    the ER with lower extremity weakness starting 3 days ago.  Also, patient was    complaining about right facial numbness and right hand numbness which started    around 6:45 this a.m.  Patient has similar admission in the past, this time    with different neurology symptom.  The patient was evaluated by neurologist in   the past as well as psychiatric.  Patient was just recently discharged from    the hospital in January as well.  She said that only medication that make her    feel better is Solu-Medrol and she requested to be treated with Solu-Medrol.     Also, per patient, Dr. Fox refer her to see a neurologist in   [place].      I was involved in her care only in hospital  2 times in the past  First time, paraplegia responded to IV solumedrol  Second time, similar but added right hemiplegia too  So far, most of her weakness responded to IV solumedrol      PAST MEDICAL HISTORY:  Past Medical History   Diagnosis Date   • Scoliosis    • Multiple personality disorder    • Chronic UTI 9/18/2010   • Heart burn    • Pain 08-15-12     back, 7/10   • History of falling    • Sinus tachycardia 10/31/2013   • Urinary incontinence    • Hypertension    • Disorder of thyroid    • Obesity    • Pneumonia 2012   • ASTHMA      when around smoke   • Breath shortness      with tachycardia   • Anginal syndrome       "random chest pain especially with tachycardia   • Psychosis    • Arthritis      osteo   • PCOS (polycystic ovarian syndrome)    • Gynecological disorder      PCOS   • Renal disorder      \"kidney disease, stage 1\" nephrologist, Dr. Vallejo   • Arrhythmia      \"sinus tachycardia\", cariologist, Dr. 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   • Sleep apnea      CPAP \"pulmonary doctor took me off mid year 2016\"   • 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CMS-HCC)        PAST SURGICAL HISTORY:  Past Surgical History   Procedure Laterality Date   • Neuro dest facet l/s w/ig sngl  4/21/2015     Performed by Reza Tabor at SURGERY [facility] ORS   • Recovery  1/27/2016     Procedure: CATH LAB EP STUDY WITH SINUS NODE MODIFICATION ABHINAV;  Surgeon: [facility];  Location: SURGERY PRE-POST PROC UNIT [facility];  Service:    • Katie by laparoscopy  8/29/2010     Performed by SHAYY JOHNS at SURGERY [facility]   • Lumbar fusion anterior  8/21/2012     Performed by SUSIE SAWANT at SURGERY [facility]   • Other cardiac surgery  1/2016     cardiac ablation   • Tonsillectomy       tonsillectomy   • Bowel stimulator insertion  7/13/2016     Procedure: BOWEL STIMULATOR INSERTION FOR PERMANENT INTERSTIM SACRAL IMPLANT;  Surgeon: Joe Noyola M.D.;  Location: [facility];  Service:    • Gastroscopy with balloon dilatation N/A 1/4/2017     Procedure: GASTROSCOPY WITH DILATATION;  Surgeon: Torers Vargas M.D.;  Location: [facility];  Service:    • Muscle biopsy Right 1/26/2017     Procedure: MUSCLE BIOPSY - THIGH;  Surgeon: Isidro Vigil M.D.;  Location: [facility];  Service:        FAMILY HISTORY:  Family History   Problem Relation Age of Onset   • Hypertension Mother    • Sleep Apnea Mother    • Heart Disease Mother    • Other Mother      hypothryod   • Hypertension Maternal Uncle    • Heart Disease " "Maternal Grandmother    • Hypertension Maternal Grandmother    • Other Sister      Narcolepsy;fibromyalsia;bone;nerve   • Genitourinary () Sister      endometriosis       SOCIAL HISTORY:  Social History     Social History   • Marital Status: Single     Spouse Name: N/A   • Number of Children: N/A   • Years of Education: N/A     Occupational History   • Not on file.     Social History Main Topics   • Smoking status: Passive Smoke Exposure - Never Smoker     Types: Cigarettes   • Smokeless tobacco: Never Used   • Alcohol Use: No   • Drug Use: No   • Sexual Activity: Not Currently     Birth Control/ Protection: Pill     Other Topics Concern   • Not on file     Social History Narrative    ** Merged History Encounter **          ALLERGIES:  Allergies   Allergen Reactions   • Cefdinir Shortness of Breath and Itching   • Depakote [Divalproex Sodium] Unspecified     Muscle spasms/muscle pain and weakness     • Abilify Unspecified     Headaches/muscle twitching     • Amitriptyline Unspecified     Headaches     • Amoxicillin Rash     Pt states \"I get a rash\".     • Ciprofloxacin Rash     Pt states \"I get a rash\".     • Clindamycin Nausea     Even with food     • Doxycycline Vomiting and Nausea     RXN=unknown   • Ees [Erythromycin] Vomiting and Nausea   • Flagyl [Metronidazole Hcl] Unspecified     \"eye problems\"     • Flomax [Tamsulosin Hydrochloride] Swelling   • Metformin Unspecified     Increased lactic acid      • Sulfa Drugs Hives and Rash     RXN=since childhood   • Tape Rash     Tears skin off   coban with Tegaderm tape ok  RXN=ongoing   • Vancomycin Itching     Pt becomes flushed in face and chest.   RXN=7/10/16   • Cephalexin [Keflex] Rash     Pt states she gets a rash with this medication   • Levofloxacin Unspecified     Leg muscle cramps     TOBHX  History   Smoking status   • Passive Smoke Exposure - Never Smoker   • Types: Cigarettes   Smokeless tobacco   • Never Used     ALCHX  History   Alcohol Use No "     DRUGHX  History   Drug Use No           MEDICATIONS:  Current Facility-Administered Medications   Medication Dose   • Respiratory Care per Protocol     • albuterol (PROVENTIL) 2.5mg/3ml nebulizer solution 2.5 mg  2.5 mg   • morphine (pf) 4 mg/ml injection 2-4 mg  2-4 mg   • glucose 4 g chewable tablet 16 g  16 g    And   • dextrose 50% (D50W) injection 25 mL  25 mL   • insulin lispro (HUMALOG) injection 2-9 Units  2-9 Units   • furosemide (LASIX) tablet 20 mg  20 mg   • aspirin (ASA) tablet 325 mg  325 mg   • atorvastatin (LIPITOR) tablet 40 mg  40 mg   • Gabapentin (Gralise) TABS 600 mg  600 mg    And   • Gabapentin (Gralise) TABS 1,200 mg  1,200 mg   • methylPREDNISolone sod succ (SOLU-MEDROL) 500 mg in  mL IVPB  500 mg   • heparin injection 5,000 Units  5,000 Units   • albuterol inhaler 2 Puff  2 Puff   • cyanocobalamin (VITAMIN B-12) tablet 1,000 mcg  1,000 mcg   • cyclobenzaprine (FLEXERIL) tablet 10 mg  10 mg   • fentanyl (DURAGESIC) 25 MCG/HR 1 Patch  1 Patch   • fluoxetine (PROZAC) capsule 10 mg  10 mg   • ivabradine (CORLANOR) tablet 5 mg  5 mg   • levothyroxine (SYNTHROID) tablet 75 mcg  75 mcg   • omeprazole (PRILOSEC) capsule 20 mg  20 mg   • famotidine (PEPCID) tablet 20 mg  20 mg   • sodium bicarbonate (SODIUM BICARBONATE) tablet 650 mg  650 mg   • vitamin D (Ergocalciferol) (DRISDOL) capsule 50,000 Units  50,000 Units   • zinc oxide oint (ZINC OXIDE OINTMENT) 20 % ointment 1 Application  1 Application   • ziprasidone (GEODON) capsule 160 mg  160 mg   • senna-docusate (PERICOLACE or SENOKOT S) 8.6-50 MG per tablet 1 Tab  1 Tab   • senna-docusate (PERICOLACE or SENOKOT S) 8.6-50 MG per tablet 1 Tab  1 Tab   • lactulose 20 GM/30ML solution 30 mL  30 mL   • bisacodyl (DULCOLAX) suppository 10 mg  10 mg   • fleet enema 133 mL  1 Each   • NS infusion     • ondansetron (ZOFRAN) syringe/vial injection 4 mg  4 mg   • promethazine (PHENERGAN) tablet 12.5-25 mg  12.5-25 mg   • promethazine (PHENERGAN)  "suppository 12.5-25 mg  12.5-25 mg   • prochlorperazine (COMPAZINE) injection 5-10 mg  5-10 mg   • oxycodone-acetaminophen (PERCOCET-10)  MG per tablet 2 Tab  2 Tab       REVIEW OF SYSTEM:    Constitutional: Denies fevers, Denies weight changes    Eyes: Denies changes in vision, no eye pain    Ears/Nose/Throat/Mouth: Denies nasal congestion or sore throat    Cardiovascular: HTN, Tachycardia  Respiratory: Asthma.    Gastrointestinal/Hepatic: Denies abdominal pain, nausea, vomiting, diarrhea, constipation or GI bleeding    Genitourinary: urine and stool incontinence for years-- on diaper  Musculoskeletal/Rheum: Denies joint pain and swelling    Skin/Breast: Denies rash, denies breast lumps or discharge    Neurological: recurrent paraplegia -- did respond to steroid    Psychiatric: multitiple personalties  Endocrine: denies hx of diabetes or thyroid dysfunction    Heme/Oncology/Lymph Nodes: Denies enlarged lymph nodes, denies brusing or known bleeding disorder    Allergic/Immunologic: Denies hx of allergies    1.  History of PCOS.  2.  Fibromyalgia.  3.  Chronic pain syndrome.  4.  History of suspected gastroparesis.  5.  History of dysphagia.  6.  Depression, anxiety and history of psychosis.  7.  Urinary retention status post suprapubic catheter placements.  8.  History of latent TB.  9.  History of supraventricular tachycardia.      PHYSICAL AND NEUROLOGICAL EXMAINATIONS:  VITAL SIGNS: /74 mmHg  Pulse 84  Temp(Src) 36.1 °C (97 °F)  Resp 16  Ht 1.6 m (5' 2.99\")  Wt 123.6 kg (272 lb 7.8 oz)  BMI 48.28 kg/m2  SpO2 97%  LMP 07/16/2016  Breastfeeding? No  CURRENT WEIGHT:   BMI: Body mass index is 48.28 kg/(m^2).  PREVIOUS WEIGHTS:  Wt Readings from Last 25 Encounters:   02/23/17 123.6 kg (272 lb 7.8 oz)   02/12/17 115.667 kg (255 lb)   01/22/17 116.574 kg (257 lb)   01/19/17 116.574 kg (257 lb)   01/18/17 118.842 kg (262 lb)   01/18/17 118.842 kg (262 lb)   01/10/17 122.018 kg (269 lb)   01/03/17 " 120.4 kg (265 lb 6.9 oz)   01/02/17 113.399 kg (250 lb)   01/01/17 113.399 kg (250 lb)   12/28/16 118.389 kg (261 lb)   12/20/16 115.667 kg (255 lb)   12/15/16 115.667 kg (255 lb)   12/13/16 116.574 kg (257 lb)   12/13/16 116.574 kg (257 lb)   12/09/16 116.574 kg (257 lb)   11/29/16 116.574 kg (257 lb)   11/29/16 116.665 kg (257 lb 3.2 oz)   11/26/16 118.3 kg (260 lb 12.9 oz)   11/22/16 116.121 kg (256 lb)   11/21/16 112.492 kg (248 lb)   10/27/16 113.399 kg (250 lb)   10/17/16 112.492 kg (248 lb)   10/10/16 112.492 kg (248 lb)   10/04/16 118.2 kg (260 lb 9.3 oz)     General appearance of patient: WDWN(+) NAD(+)    EYES  o Fundus : Papilledem(-) Exudates(-) Hemorrhage(-)  Nervous System  Orientation to time, place and person(+)  Memory normal(+)  Language: aphasia(-)  Knowledge: past(+) Current(+)  Attention(+)  Cranial Nerves  • Nerve 2: intact  • Nerve 3,4,6: intact  • Nerve 5 : intact  • Nerve 7: intact  • Nerve 8: intact  • Nerve 9 & 10: intact  • Nerve 11: intact  • Nerve 12: intact  Muscle Power and muscle tone: right hemipelgia, lower limbs stiff and weak, no strength at all, no efforts either  Sensory System: Pin sensation intact(+)  Reflexes: symmetric throughout-- even ankle reflexes are preserved  Cerebellar Function FNP normal   Gait : bed ridden  Heart and Vascular  Peripheral Vasucular system : Edema (-) Swelling(-)  RHB, Breathing sound clear  abdomen bowel sound normoactive  Extremities freely moveable  Joints no contractureJoints no contracture       NEUROIMAGING: I reviewed the CT of brain       LAB:  Recent Labs      02/22/17   1006  02/23/17   0220  02/24/17   0333   WBC  4.1*  5.0  5.5   RBC  4.22  4.32  4.10*   HEMOGLOBIN  12.3  12.9  12.2   HEMATOCRIT  38.0  38.5  36.9*   MCV  90.0  89.1  90.0   MCH  29.1  29.9  29.8   MCHC  32.4*  33.5*  33.1*   RDW  44.1  42.7  44.2   PLATELETCT  204  209  216   MPV  11.4  11.3  11.9       IMPRESSION    1. Recurrent Right hemiplegia and Paraplegia ----Whole  "Spine MRI was interpreted as not remarkable in the past and Brain CT was not remarkable either -  2. Hx of Myelitis? ( not all myelitis will show up in MRI of spine) or medical myelitis such as Vit B12 deficiency?  3. Psychiatric issues may play some roles  4. Severe claustrophobia  5. Microsomal antibody positive-- sign of autoimmune disease- such as Hashimoto's Thyroiditis, Hashimoto's Encephalitis etc.... Steroid will help       DR Fox know this patient well      ________________________________________________________________________      Although psychogenic is likely  Autoimmune induced frontal lobe problems are possible too-- such as Hashimoto Encephalitis  Hashimoto Encephalitis could produce stroke like episodes and paraplegia  Moreover, Psychiatric problems stem from frontal lobe and there is a gray zone between frontal lobe dysfunction and \"psychiatric problems\"  ________________________________________________________________________               Recurrent stroke like symptoms and recurrent paraplegia ( responded to steroid)    ________________________________________________________________________    I will discuss with patient's neurologist DR Fox for future planning    Prolonged video EEG might be of help -- looking for frontal lobe dysfunction-- I could provide this for the patient    SPECT scan might be of help- looking for objective cognitive dysfunction in functional studies  Repeat LP might be of help-- I could offer that     ________________________________________________________________________      Advise IV solumedrol  3 days or 5 days            SIGNATURE:  Jennifer Reno      CC:  Torres Brody M.D.    Results for HARLEY DE LA CRUZ (MRN [id number]) as of 2/24/2017 21:51   Ref. Range 6/28/2015 14:07 3/7/2016 01:44 1/26/2017 02:38   Microsomal -Tpo- Abs Latest Ref Range: 0.0-9.0 IU/mL 351.8 (H) 111.4 (H) 133.2 (H)       "

## 2017-02-25 NOTE — CARE PLAN
Problem: Pain Management  Goal: Pain level will decrease to patient’s comfort goal  Outcome: PROGRESSING SLOWER THAN EXPECTED  Patient educated on pharmacological and nonpharmacologic interventions. Medicated per MD order, provided ice and heat packs.

## 2017-02-25 NOTE — PROGRESS NOTES
Hospital Medicine Progress Note, Adult, Complex               Author: Earl Meehan Date & Time created: 2/24/2017  11:36 PM     Interval History:  26 yr old female with multiple recurrent paraparesis, concern for mitochondrial disease being evaluated at [facility], urinary retention s/p supra pubic catheter placement, psych disorder and conversion disorder, chronic pain syndrome who presents with bilateral lower extremity weakness and right facial numbness and right arm numbness. Seen by  and Rowdy and referred to  for further evaluation. Had recent muscle biopsy for suspected mitochondrial disease which revealed: Type 2 fiber atrophy, mild. Patient has has responded to high dose steroids during her previous admissions    2/23 Patient has persistent weakness and numbness. Patient is demanding to have her home dose of lasix for swelling of her lower extremities, however no pitting edema noted on exam. Patient demanding to restart her high dose extended release gabapentin. CT head negative. Unable to do MRI because patient has gastric stimulator. Started on aspirin and statin daily. MRI of brain last year revealed no abnormality, cannot repeat due to gastric stimulator. Will start on pulse steroids and assess response.      2/24 Discussed with Dr Reno. He will evaluate. Transition to PO steroids. Patient reports to be improving already. Will ambulate today with nursing. Requesting IV narcotics. Ordered for now. Urine concerning for UTI. Started macrobid.     Review of Systems:  Review of Systems   Constitutional: Negative for fever and chills.   HENT: Negative for hearing loss.    Eyes: Negative for blurred vision and double vision.   Respiratory: Negative for cough, shortness of breath and wheezing.    Cardiovascular: Negative for chest pain and leg swelling.   Gastrointestinal: Negative for abdominal pain and diarrhea.   Genitourinary: Positive for dysuria.   Musculoskeletal: Positive for back pain.    Neurological: Positive for tingling, sensory change and focal weakness. Negative for dizziness, loss of consciousness and headaches.       Physical Exam:  Physical Exam   Constitutional: She is oriented to person, place, and time. She appears well-developed. No distress.   HENT:   Head: Atraumatic.   Eyes: Conjunctivae are normal.   Neck: Neck supple.   Cardiovascular: Normal rate and regular rhythm.    Pulmonary/Chest: No respiratory distress. She has no wheezes.   Abdominal: Soft. Bowel sounds are normal. She exhibits no distension. There is no tenderness.   Musculoskeletal: She exhibits no edema or tenderness.   Neurological: She is alert and oriented to person, place, and time.   B/l lower extremity weakness  Right upper extremity weakness   Skin: Skin is warm and dry.   Psychiatric: She has a normal mood and affect. Her behavior is normal.   Nursing note and vitals reviewed.      Labs:        Invalid input(s): [id number]  Recent Labs      02/22/17 1006   CPKTOTAL  45   TROPONINI  <0.01   BNPBTYPENAT  20     Recent Labs      02/22/17 1006 02/23/17 0220 02/24/17   0332   SODIUM  136  133*  136   POTASSIUM  3.7  4.9  4.7   CHLORIDE  103  101  101   CO2  22  19*  23   BUN  8  14  17   CREATININE  0.55  0.77  0.69   CALCIUM  9.4  9.2  9.2     Recent Labs      02/22/17 1006 02/23/17 0220 02/24/17   0332   ALTSGPT  107*  110*   --    ASTSGOT  64*  69*   --    ALKPHOSPHAT  77  77   --    TBILIRUBIN  0.6  0.6   --    LIPASE  28   --    --    GLUCOSE  134*  277*  320*     Recent Labs      02/22/17 1006 02/23/17 0220 02/24/17   0333   RBC  4.22  4.32  4.10*   HEMOGLOBIN  12.3  12.9  12.2   HEMATOCRIT  38.0  38.5  36.9*   PLATELETCT  204  209  216   PROTHROMBTM  13.3   --    --    APTT  26.5   --    --    INR  0.98   --    --      Recent Labs      02/22/17 1006 02/23/17 0220 02/24/17   0333   WBC  4.1*  5.0  5.5   NEUTSPOLYS  33.20*   --   79.40*   LYMPHOCYTES  49.40*   --   16.30*    MONOCYTES  9.20   --   3.50   EOSINOPHILS  7.50*   --   0.20   BASOPHILS  0.50   --   0.20   ASTSGOT  64*  69*   --    ALTSGPT  107*  110*   --    ALKPHOSPHAT  77  77   --    TBILIRUBIN  0.6  0.6   --            Hemodynamics:  Temp (24hrs), Av.6 °C (97.8 °F), Min:36.1 °C (97 °F), Max:36.9 °C (98.4 °F)  Temperature: 36.1 °C (97 °F)  Pulse  Av.3  Min: 82  Max: 104   Blood Pressure: 129/74 mmHg     Respiratory:    Respiration: 16, Pulse Oximetry: 97 %, O2 Daily Delivery Respiratory : Nasal Cannula     Given By:: Mouthpiece, Work Of Breathing / Effort: Mild  RUL Breath Sounds: Clear, RML Breath Sounds: Diminished, RLL Breath Sounds: Diminished, MARY Breath Sounds: Clear, LLL Breath Sounds: Diminished  Fluids:    Intake/Output Summary (Last 24 hours) at 17 2336  Last data filed at 17 2100   Gross per 24 hour   Intake   4275 ml   Output   3200 ml   Net   1075 ml     Weight: 123.6 kg (272 lb 7.8 oz)  GI/Nutrition:  Orders Placed This Encounter   Procedures   • Diet Order     Standing Status: Standing      Number of Occurrences: 1      Standing Expiration Date:      Order Specific Question:  Diet:     Answer:  Regular [1]     Order Specific Question:  Diet:     Answer:  Diabetic [3]     CT head w/o  Lateral ventricles are normal in size and symmetric.  Cortical sulci are within normal limits.  No significant mass effect or midline shift.  Basal cisterns are patent.  No evidence for intracranial hemorrhage.  Calvaria are intact.  Visualized orbits are unremarkable.  Visualized mastoid air cells are clear.  Visualized paranasal sinuses are unremarkable.      Medical Decision Making, by Problem:  Active Hospital Problems    Diagnosis   • • Leg weakness, bilateral [M62.81]   - unknown etiology, multiple admissions in past which responded well to steroids  - questinable mitochondrial disease though biopsy did not prove this  - stop IV steroids. Transition to PO steroids.   - Discussed with Dr Reno. Dr Reno  to evaluate patient.   - MRI cannot be done due to gastric stimulator  - Continue aspirin and statin for concern of stroke, though less likely for now  - PT/OT  - Patient reports to be improving.    HTN (hypertension) [I10]  - Well controlled  - Continue home regimen   • Chronic pain syndrome [G89.4]  - Continue home regimen with PRN supplementation for acute issues   • Psychosis, schizophrenia, simple (HCC) [F20.89]  - Continue Geodon   • Hypothyroidism [E03.9]  - Synthroid   • Weakness of right upper extremity [M62.81]  - As noted above  - Consult from neurology requested. Dr Reno to evaluate.        • Chronic suprapubic catheter (CMS-HCC) [Z93.59]  - Cx with enterococcus. Start Macrobid.    • 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]  - Hx of    • GERD (gastroesophageal reflux disease) [K21.9]  - Continue pepcid   • Morbidly obese (CMS-HCC) [E66.01]  - Body mass index is 48.28 kg/(m^2).   • Peripheral neuropathy (CMS-HCC) [G62.9]  - Continue gabapentin   = Steroid induced hyperglycemia: SSI  = Disposition: Continue PT/OT. Disposition needs to be determined.     Labs reviewed, Medications reviewed and Radiology images reviewed  Andrade catheter: Urinary Tract Retention or Urinary Tract Obstruction      DVT Prophylaxis: Heparin  DVT prophylaxis - mechanical: SCDs  Ulcer prophylaxis: Not indicated  Antibiotics: Treating active infection/contamination beyond 24 hours perioperative coverage  Assessed for rehab: Patient was assess for and/or received rehabilitation services during this hospitalization

## 2017-02-26 ENCOUNTER — APPOINTMENT (OUTPATIENT)
Dept: RADIOLOGY | Facility: MEDICAL CENTER | Age: 27
DRG: 556 | End: 2017-02-26
Attending: INTERNAL MEDICINE
Payer: MEDICARE

## 2017-02-26 LAB
GLUCOSE BLD-MCNC: 110 MG/DL (ref 65–99)
GLUCOSE BLD-MCNC: 114 MG/DL (ref 65–99)
GLUCOSE BLD-MCNC: 153 MG/DL (ref 65–99)

## 2017-02-26 PROCEDURE — A9270 NON-COVERED ITEM OR SERVICE: HCPCS | Performed by: INTERNAL MEDICINE

## 2017-02-26 PROCEDURE — 700102 HCHG RX REV CODE 250 W/ 637 OVERRIDE(OP): Performed by: INTERNAL MEDICINE

## 2017-02-26 PROCEDURE — 700111 HCHG RX REV CODE 636 W/ 250 OVERRIDE (IP): Performed by: INTERNAL MEDICINE

## 2017-02-26 PROCEDURE — 99232 SBSQ HOSP IP/OBS MODERATE 35: CPT | Performed by: INTERNAL MEDICINE

## 2017-02-26 PROCEDURE — 82962 GLUCOSE BLOOD TEST: CPT | Mod: 91

## 2017-02-26 PROCEDURE — 770006 HCHG ROOM/CARE - MED/SURG/GYN SEMI*

## 2017-02-26 RX ADMIN — HEPARIN SODIUM 5000 UNITS: 5000 INJECTION, SOLUTION INTRAVENOUS; SUBCUTANEOUS at 14:22

## 2017-02-26 RX ADMIN — ALBUTEROL SULFATE 2 PUFF: 90 AEROSOL, METERED RESPIRATORY (INHALATION) at 07:43

## 2017-02-26 RX ADMIN — MORPHINE SULFATE 4 MG: 4 INJECTION INTRAVENOUS at 10:03

## 2017-02-26 RX ADMIN — FAMOTIDINE 20 MG: 20 TABLET, FILM COATED ORAL at 07:44

## 2017-02-26 RX ADMIN — SODIUM BICARBONATE TAB 650 MG 650 MG: 650 TAB at 07:43

## 2017-02-26 RX ADMIN — NITROFURANTOIN MONOHYDRATE AND NITROFURANTOIN MACROCRYSTALLINE 100 MG: 75; 25 CAPSULE ORAL at 07:43

## 2017-02-26 RX ADMIN — ZINC OXIDE 1 APPLICATION: 200 OINTMENT TOPICAL at 20:50

## 2017-02-26 RX ADMIN — GABAPENTIN 600 MG: 600 TABLET ORAL at 01:00

## 2017-02-26 RX ADMIN — ONDANSETRON 4 MG: 2 INJECTION, SOLUTION INTRAMUSCULAR; INTRAVENOUS at 04:02

## 2017-02-26 RX ADMIN — CYANOCOBALAMIN TAB 500 MCG 1000 MCG: 500 TAB at 20:49

## 2017-02-26 RX ADMIN — MORPHINE SULFATE 4 MG: 4 INJECTION INTRAVENOUS at 04:46

## 2017-02-26 RX ADMIN — OMEPRAZOLE 20 MG: 20 CAPSULE, DELAYED RELEASE ORAL at 07:43

## 2017-02-26 RX ADMIN — INSULIN LISPRO 2 UNITS: 100 INJECTION, SOLUTION INTRAVENOUS; SUBCUTANEOUS at 12:31

## 2017-02-26 RX ADMIN — OXYCODONE HYDROCHLORIDE AND ACETAMINOPHEN 2 TABLET: 10; 325 TABLET ORAL at 17:13

## 2017-02-26 RX ADMIN — HEPARIN SODIUM 5000 UNITS: 5000 INJECTION, SOLUTION INTRAVENOUS; SUBCUTANEOUS at 20:50

## 2017-02-26 RX ADMIN — LEVOTHYROXINE SODIUM 75 MCG: 75 TABLET ORAL at 06:00

## 2017-02-26 RX ADMIN — OMEPRAZOLE 20 MG: 20 CAPSULE, DELAYED RELEASE ORAL at 17:13

## 2017-02-26 RX ADMIN — ATORVASTATIN CALCIUM 40 MG: 40 TABLET, FILM COATED ORAL at 20:49

## 2017-02-26 RX ADMIN — OXYCODONE HYDROCHLORIDE AND ACETAMINOPHEN 2 TABLET: 10; 325 TABLET ORAL at 12:26

## 2017-02-26 RX ADMIN — PREDNISONE 50 MG: 50 TABLET ORAL at 07:44

## 2017-02-26 RX ADMIN — FAMOTIDINE 20 MG: 20 TABLET, FILM COATED ORAL at 20:49

## 2017-02-26 RX ADMIN — SODIUM BICARBONATE TAB 650 MG 650 MG: 650 TAB at 20:50

## 2017-02-26 RX ADMIN — GABAPENTIN 1200 MG: 600 TABLET ORAL at 10:00

## 2017-02-26 RX ADMIN — MORPHINE SULFATE 4 MG: 4 INJECTION INTRAVENOUS at 14:22

## 2017-02-26 RX ADMIN — CYANOCOBALAMIN TAB 500 MCG 1000 MCG: 500 TAB at 07:44

## 2017-02-26 RX ADMIN — MORPHINE SULFATE 4 MG: 4 INJECTION INTRAVENOUS at 20:49

## 2017-02-26 RX ADMIN — MORPHINE SULFATE 4 MG: 4 INJECTION INTRAVENOUS at 01:39

## 2017-02-26 RX ADMIN — CYCLOBENZAPRINE HYDROCHLORIDE 10 MG: 10 TABLET, FILM COATED ORAL at 20:50

## 2017-02-26 RX ADMIN — STANDARDIZED SENNA CONCENTRATE AND DOCUSATE SODIUM 1 TABLET: 8.6; 5 TABLET, FILM COATED ORAL at 20:50

## 2017-02-26 RX ADMIN — ASPIRIN 325 MG: 325 TABLET, COATED ORAL at 07:44

## 2017-02-26 RX ADMIN — HEPARIN SODIUM 5000 UNITS: 5000 INJECTION, SOLUTION INTRAVENOUS; SUBCUTANEOUS at 05:59

## 2017-02-26 RX ADMIN — CYCLOBENZAPRINE HYDROCHLORIDE 10 MG: 10 TABLET, FILM COATED ORAL at 07:44

## 2017-02-26 RX ADMIN — OXYCODONE HYDROCHLORIDE AND ACETAMINOPHEN 2 TABLET: 10; 325 TABLET ORAL at 05:59

## 2017-02-26 RX ADMIN — ZIPRASIDONE HYDROCHLORIDE 160 MG: 80 CAPSULE ORAL at 18:00

## 2017-02-26 RX ADMIN — FUROSEMIDE 20 MG: 20 TABLET ORAL at 07:45

## 2017-02-26 RX ADMIN — ONDANSETRON 4 MG: 2 INJECTION, SOLUTION INTRAMUSCULAR; INTRAVENOUS at 14:22

## 2017-02-26 RX ADMIN — FLUOXETINE 10 MG: 10 CAPSULE ORAL at 07:43

## 2017-02-26 RX ADMIN — SODIUM BICARBONATE TAB 650 MG 650 MG: 650 TAB at 17:13

## 2017-02-26 RX ADMIN — NITROFURANTOIN MONOHYDRATE AND NITROFURANTOIN MACROCRYSTALLINE 100 MG: 75; 25 CAPSULE ORAL at 17:14

## 2017-02-26 ASSESSMENT — ENCOUNTER SYMPTOMS
SHORTNESS OF BREATH: 0
FOCAL WEAKNESS: 1
DIZZINESS: 0
LOSS OF CONSCIOUSNESS: 0
BACK PAIN: 1
TINGLING: 1
COUGH: 0
ABDOMINAL PAIN: 0
MEMORY LOSS: 0
HEADACHES: 0
FEVER: 0
CHILLS: 0
WHEEZING: 0
BLURRED VISION: 0
DIARRHEA: 0
TINGLING: 0
DOUBLE VISION: 0
SENSORY CHANGE: 1

## 2017-02-26 ASSESSMENT — PAIN SCALES - GENERAL
PAINLEVEL_OUTOF10: 8
PAINLEVEL_OUTOF10: 6
PAINLEVEL_OUTOF10: 8

## 2017-02-26 NOTE — PROGRESS NOTES
Neurology Progress Note               Author: Sandoval Fox Date & Time created: 2/26/2017  12:10 PM     Interval History:  Patient states she is doing very well, she actually had a very good recovery after 2 days of 1 g methylprednisolone IV. She walked the halls yesterday with little difficulty. I reviewed the notes from Dr. Reno, EEG was unremarkable, no evidence of subfrontal paroxysmal activity, nothing clearly epileptiform. Thyroid peroxidase antibodies were elevated, most of the other autoimmune antibody titers so far are unremarkable. The possibility of this being a Hashimoto's thyroiditis with myelopathy, paraparesis, and fluctuating cognition with response to steroids is more of a possibility. Well-known to me, CSF studies in the past were normal, recent muscle biopsy as well. Imaging of the entire spinal axis also is unremarkable.    Review of Systems:  Review of Systems   Constitutional: Positive for malaise/fatigue.   Neurological: Positive for focal weakness. Negative for tingling, loss of consciousness and headaches.   Psychiatric/Behavioral: Negative for memory loss.   All other systems reviewed and are negative.      Physical Exam:  Physical Exam   Constitutional: She is oriented to person, place, and time. She appears well-developed.   HENT:   Head: Atraumatic.   Eyes: EOM are normal. Pupils are equal, round, and reactive to light.   Visual fields are grossly full.   Neck: Neck supple.   Cardiovascular: Regular rhythm.    Musculoskeletal: Normal range of motion.   Neurological: She is alert and oriented to person, place, and time. She displays normal reflexes. No cranial nerve deficit.   She is fully oriented, there is no aphasia. Facial movements are symmetric, there is no dysarthria or sensory extinction with double simultaneous stimulation between both sides of the face. Strength is now almost fully intact bilaterally, there may be a little weakness with the right arm and leg, though there  was also some impersistence with effort. There is no appendicular dystaxia with any of the extremities, repetitive movements with the hands, fingers and feet are intact and symmetric. There is no sensory extinction with double simultaneous stimulation on light touch. There is no appendicular dystaxia with the upper extremities.       Labs:        Invalid input(s): [id number]      Recent Labs      17   SODIUM  136  137   POTASSIUM  4.7  4.0   CHLORIDE  101  100   CO2  23  25   BUN  17  20   CREATININE  0.69  0.70   MAGNESIUM   --   2.2   PHOSPHORUS   --   3.0   CALCIUM  9.2  9.2     Recent Labs      17   ALTSGPT   --   71*   ASTSGOT   --   22   ALKPHOSPHAT   --   57   TBILIRUBIN   --   0.4   GLUCOSE  320*  213*     Recent Labs      17   RBC  4.10*  4.10*   HEMOGLOBIN  12.2  12.2   HEMATOCRIT  36.9*  36.7*   PLATELETCT  216  197     Recent Labs      17   WBC  5.5  5.1   NEUTSPOLYS  79.40*   --    LYMPHOCYTES  16.30*   --    MONOCYTES  3.50   --    EOSINOPHILS  0.20   --    BASOPHILS  0.20   --    ASTSGOT   --   22   ALTSGPT   --   71*   ALKPHOSPHAT   --   57   TBILIRUBIN   --   0.4     Recent Labs      17   SODIUM  136  137   POTASSIUM  4.7  4.0   CHLORIDE  101  100   CO2  23  25   GLUCOSE  320*  213*   BUN  17  20   CREATININE  0.69  0.70   CALCIUM  9.2  9.2     Hemodynamics:  Temp (24hrs), Av.4 °C (97.5 °F), Min:36.2 °C (97.1 °F), Max:36.6 °C (97.8 °F)  Temperature: 36.2 °C (97.1 °F)  Pulse  Av  Min: 75  Max: 104   Blood Pressure: 149/74 mmHg     Respiratory:    Respiration: 16, Pulse Oximetry: 96 %        RUL Breath Sounds: Clear, RML Breath Sounds: Clear, RLL Breath Sounds: Clear, MARY Breath Sounds: Clear, LLL Breath Sounds: Clear  Fluids:    Intake/Output Summary (Last 24 hours) at 17 1210  Last data filed at 17 0400   Gross per 24 hour  "  Intake      0 ml   Output   2150 ml   Net  -2150 ml        GI/Nutrition:  Orders Placed This Encounter   Procedures   • Diet Order     Standing Status: Standing      Number of Occurrences: 1      Standing Expiration Date:      Order Specific Question:  Diet:     Answer:  Diabetic [3]     Medical Decision Making, by Problem:  Active Hospital Problems    Diagnosis   • HTN (hypertension) [I10]   • Chronic pain syndrome [G89.4]   • Psychosis, schizophrenia, simple ([facility]) [F20.89]   • Hypothyroidism [E03.9]   • Weakness of right upper extremity [M62.81]   • Leg weakness, bilateral [M62.81]   • Chronic suprapubic catheter (CMS-[facility]) [Z93.59]   • 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]   • GERD (gastroesophageal reflux disease) [K21.9]   • Morbidly obese (CMS-[facility]) [E66.01]   • Peripheral neuropathy (CMS-[facility]) [G62.9]       Plan:  Fluctuating paraparesis and hemiparesis: A clear etiology has not been identified, though the possibility of autoimmune Hashimoto's disease certainly needs to be considered in light of positive thyroid globulin antibodies. She will require some type of pulse steroid as a maintenance dose to prevent these fluctuations and \"attacks\" of symptoms. I would recommend starting at 1 g methylprednisolone IV every month. I also agree that there is a strong functional component overlying all of the above, but this wouldn't explain the serologic abnormalities either.    Face-to-face time was spent reviewing all the above. Neurologically she is stable, she certainly can be discharged home tomorrow and outpatient arrangements for IV steroids on a monthly basis can be made. She can follow-up with me as previously scheduled.    Time: Evaluation of 25 minutes for them, review, discussion, and education  Discussion: As mentioned in the assessment, over 50% of the time spent face-to-face with patient and her grandmother counseling and coordinating care    Core Measures    "

## 2017-02-26 NOTE — PROGRESS NOTES
Hospital Medicine Progress Note, Adult, Complex               Author: Earl Meehan Date & Time created: 2/25/2017  6:26 PM     Interval History:  26 yr old female with multiple recurrent paraparesis, concern for mitochondrial disease being evaluated at [facility], urinary retention s/p supra pubic catheter placement, psych disorder and conversion disorder, chronic pain syndrome who presents with bilateral lower extremity weakness and right facial numbness and right arm numbness. Seen by  and Rowdy and referred to  for further evaluation. Had recent muscle biopsy for suspected mitochondrial disease which revealed: Type 2 fiber atrophy, mild. Patient has has responded to high dose steroids during her previous admissions    2/23 Patient has persistent weakness and numbness. Patient is demanding to have her home dose of lasix for swelling of her lower extremities, however no pitting edema noted on exam. Patient demanding to restart her high dose extended release gabapentin. CT head negative. Unable to do MRI because patient has gastric stimulator. Started on aspirin and statin daily. MRI of brain last year revealed no abnormality, cannot repeat due to gastric stimulator. Will start on pulse steroids and assess response.      2/24 Discussed with Dr Reno. He will evaluate. Transition to PO steroids. Patient reports to be improving already. Will ambulate today with nursing. Requesting IV narcotics. Ordered for now. Urine concerning for UTI. Started macrobid.     2/25: Ongoing improving. Rasing Legs against gravity. Improving motor function and trying to ambulate. No active complaints. Morphine escalated to control acute pain related issues.     Review of Systems:  Review of Systems   Constitutional: Negative for fever and chills.   HENT: Negative for hearing loss.    Eyes: Negative for blurred vision and double vision.   Respiratory: Negative for cough, shortness of breath and wheezing.    Cardiovascular: Negative for  chest pain and leg swelling.   Gastrointestinal: Negative for abdominal pain and diarrhea.   Genitourinary: Positive for dysuria.   Musculoskeletal: Positive for back pain.   Neurological: Positive for tingling, sensory change and focal weakness. Negative for dizziness, loss of consciousness and headaches.       Physical Exam:  Physical Exam   Constitutional: She is oriented to person, place, and time. She appears well-developed. No distress.   HENT:   Head: Atraumatic.   Eyes: Conjunctivae are normal.   Neck: Neck supple.   Cardiovascular: Normal rate and regular rhythm.    Pulmonary/Chest: No respiratory distress. She has no wheezes.   Abdominal: Soft. Bowel sounds are normal. She exhibits no distension. There is no tenderness.   Musculoskeletal: She exhibits no edema or tenderness.   Neurological: She is alert and oriented to person, place, and time.   B/l lower extremity weakness  Right upper extremity weakness   Skin: Skin is warm and dry.   Psychiatric: She has a normal mood and affect. Her behavior is normal.   Nursing note and vitals reviewed.      Labs:        Invalid input(s): [id number]      Recent Labs      02/23/17 0220 02/24/17 0332 02/25/17 0225   SODIUM  133*  136  137   POTASSIUM  4.9  4.7  4.0   CHLORIDE  101  101  100   CO2  19*  23  25   BUN  14  17  20   CREATININE  0.77  0.69  0.70   MAGNESIUM   --    --   2.2   PHOSPHORUS   --    --   3.0   CALCIUM  9.2  9.2  9.2     Recent Labs      02/23/17 0220 02/24/17 0332 02/25/17 0225   ALTSGPT  110*   --   71*   ASTSGOT  69*   --   22   ALKPHOSPHAT  77   --   57   TBILIRUBIN  0.6   --   0.4   GLUCOSE  277*  320*  213*     Recent Labs      02/23/17 0220 02/24/17 0333 02/25/17 0225   RBC  4.32  4.10*  4.10*   HEMOGLOBIN  12.9  12.2  12.2   HEMATOCRIT  38.5  36.9*  36.7*   PLATELETCT  209  216  197     Recent Labs      02/23/17 0220 02/24/17 0333 02/25/17 0225   WBC  5.0  5.5  5.1   NEUTSPOLYS   --   79.40*   --     LYMPHOCYTES   --   16.30*   --    MONOCYTES   --   3.50   --    EOSINOPHILS   --   0.20   --    BASOPHILS   --   0.20   --    ASTSGOT  69*   --   22   ALTSGPT  110*   --   71*   ALKPHOSPHAT  77   --   57   TBILIRUBIN  0.6   --   0.4           Hemodynamics:  Temp (24hrs), Av.4 °C (97.5 °F), Min:36.1 °C (97 °F), Max:36.7 °C (98 °F)  Temperature: 36.4 °C (97.6 °F)  Pulse  Av.2  Min: 75  Max: 104   Blood Pressure: 143/82 mmHg     Respiratory:    Respiration: 18, Pulse Oximetry: 93 %     Work Of Breathing / Effort: Mild  RUL Breath Sounds: Clear, RML Breath Sounds: Clear, RLL Breath Sounds: Clear, MARY Breath Sounds: Clear, LLL Breath Sounds: Clear  Fluids:    Intake/Output Summary (Last 24 hours) at 17 1826  Last data filed at 17 1648   Gross per 24 hour   Intake   1400 ml   Output   3600 ml   Net  -2200 ml        GI/Nutrition:  Orders Placed This Encounter   Procedures   • Diet Order     Standing Status: Standing      Number of Occurrences: 1      Standing Expiration Date:      Order Specific Question:  Diet:     Answer:  Diabetic [3]     CT head w/o  Lateral ventricles are normal in size and symmetric.  Cortical sulci are within normal limits.  No significant mass effect or midline shift.  Basal cisterns are patent.  No evidence for intracranial hemorrhage.  Calvaria are intact.  Visualized orbits are unremarkable.  Visualized mastoid air cells are clear.  Visualized paranasal sinuses are unremarkable.      Medical Decision Making, by Problem:  Active Hospital Problems    Diagnosis   • • Leg weakness, bilateral [M62.81]   - unknown etiology, multiple admissions in past which responded well to steroids  - questinable mitochondrial disease though biopsy did not prove this  - stop IV steroids. Transition to PO steroids. Continue this.   - Neurology on board. Seen by Dr Reno. Recommendations per Neurology.   - Already improving. Trying to ambulate now.   - MRI cannot be done due to gastric  stimulator  - Continue aspirin and statin for concern of stroke, though less likely for now  - PT/OT  - Patient reports to be improving.    HTN (hypertension) [I10]  - Well controlled  - Continue home regimen   • Chronic pain syndrome [G89.4]  - Continue home regimen with PRN supplementation for acute issues   • Psychosis, schizophrenia, simple ([facility]) [F20.89]  - Continue Geodon   • Hypothyroidism [E03.9]  - Synthroid   • Weakness of right upper extremity [M62.81]  - As noted above  - Consult from neurology requested. Dr Reno to evaluate.        • Chronic suprapubic catheter (CMS-HCC) [Z93.59]  - Cx with enterococcus. Started Macrobid.    • 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]  - Hx of    • GERD (gastroesophageal reflux disease) [K21.9]  - Continue pepcid   • Morbidly obese (CMS-HCC) [E66.01]  - Body mass index is 48.28 kg/(m^2).   • Peripheral neuropathy (CMS-HCC) [G62.9]  - Continue gabapentin   = Steroid induced hyperglycemia: SSI  = Disposition: Continue PT/OT. Disposition needs to be determined.     Labs reviewed, Medications reviewed and Radiology images reviewed  Andrade catheter: Urinary Tract Retention or Urinary Tract Obstruction      DVT Prophylaxis: Heparin  DVT prophylaxis - mechanical: SCDs  Ulcer prophylaxis: Not indicated  Antibiotics: Treating active infection/contamination beyond 24 hours perioperative coverage  Assessed for rehab: Patient was assess for and/or received rehabilitation services during this hospitalization

## 2017-02-26 NOTE — PROGRESS NOTES
Assumed care of pt; pt aox4; pt reports pain 9/10, medicated per MAR; pt reports numbness and tingling in right face, right arm, and BLE; [place]; decreased strength in right arm, 5/5 in left arm, 4/5 in BLE; suprapubic cath in place, dark yellow urine; family at bedside; pt demonstrates able to use call light and phone; pt up to chair for breakfast, 1x assist back to bed; safety precautions in place; hourly rounding in place

## 2017-02-26 NOTE — PROGRESS NOTES
Hospital Medicine Progress Note, Adult, Complex               Author: Earl Meehan Date & Time created: 2/26/2017  2:11 PM     Interval History:  26 yr old female with multiple recurrent paraparesis, concern for mitochondrial disease being evaluated at [facility], urinary retention s/p supra pubic catheter placement, psych disorder and conversion disorder, chronic pain syndrome who presents with bilateral lower extremity weakness and right facial numbness and right arm numbness. Seen by  and Rowdy and referred to  for further evaluation. Had recent muscle biopsy for suspected mitochondrial disease which revealed: Type 2 fiber atrophy, mild. Patient has has responded to high dose steroids during her previous admissions    2/23 Patient has persistent weakness and numbness. Patient is demanding to have her home dose of lasix for swelling of her lower extremities, however no pitting edema noted on exam. Patient demanding to restart her high dose extended release gabapentin. CT head negative. Unable to do MRI because patient has gastric stimulator. Started on aspirin and statin daily. MRI of brain last year revealed no abnormality, cannot repeat due to gastric stimulator. Will start on pulse steroids and assess response.      2/24 Discussed with Dr Reno. He will evaluate. Transition to PO steroids. Patient reports to be improving already. Will ambulate today with nursing. Requesting IV narcotics. Ordered for now. Urine concerning for UTI. Started macrobid.     2/25: Ongoing improving. Rasing Legs against gravity. Improving motor function and trying to ambulate. No active complaints. Morphine escalated to control acute pain related issues.     2/26: Improving. Thinks she will be able to go home tomorrow.     Review of Systems:  Review of Systems   Constitutional: Negative for fever and chills.   HENT: Negative for hearing loss.    Eyes: Negative for blurred vision and double vision.   Respiratory: Negative for cough,  shortness of breath and wheezing.    Cardiovascular: Negative for chest pain and leg swelling.   Gastrointestinal: Negative for abdominal pain and diarrhea.   Genitourinary: Positive for dysuria.   Musculoskeletal: Positive for back pain.   Neurological: Positive for tingling, sensory change and focal weakness. Negative for dizziness, loss of consciousness and headaches.       Physical Exam:  Physical Exam   Constitutional: She is oriented to person, place, and time. She appears well-developed. No distress.   HENT:   Head: Atraumatic.   Eyes: Conjunctivae are normal.   Neck: Neck supple.   Cardiovascular: Normal rate and regular rhythm.    Pulmonary/Chest: No respiratory distress. She has no wheezes.   Abdominal: Soft. Bowel sounds are normal. She exhibits no distension. There is no tenderness.   Musculoskeletal: She exhibits no edema or tenderness.   Neurological: She is alert and oriented to person, place, and time.   Ambulating now. Improved strength.    Skin: Skin is warm and dry.   Psychiatric: She has a normal mood and affect. Her behavior is normal.   Nursing note and vitals reviewed.      Labs:        Invalid input(s): [id number]      Recent Labs      02/24/17 0332 02/25/17 0225   SODIUM  136  137   POTASSIUM  4.7  4.0   CHLORIDE  101  100   CO2  23  25   BUN  17  20   CREATININE  0.69  0.70   MAGNESIUM   --   2.2   PHOSPHORUS   --   3.0   CALCIUM  9.2  9.2     Recent Labs      02/24/17 0332 02/25/17 0225   ALTSGPT   --   71*   ASTSGOT   --   22   ALKPHOSPHAT   --   57   TBILIRUBIN   --   0.4   GLUCOSE  320*  213*     Recent Labs      02/24/17 0333 02/25/17 0225   RBC  4.10*  4.10*   HEMOGLOBIN  12.2  12.2   HEMATOCRIT  36.9*  36.7*   PLATELETCT  216  197     Recent Labs      02/24/17 0333 02/25/17 0225   WBC  5.5  5.1   NEUTSPOLYS  79.40*   --    LYMPHOCYTES  16.30*   --    MONOCYTES  3.50   --    EOSINOPHILS  0.20   --    BASOPHILS  0.20   --    ASTSGOT   --   22   ALTSGPT   --    71*   ALKPHOSPHAT   --   57   TBILIRUBIN   --   0.4           Hemodynamics:  Temp (24hrs), Av.4 °C (97.5 °F), Min:36.2 °C (97.1 °F), Max:36.6 °C (97.8 °F)  Temperature: 36.2 °C (97.1 °F)  Pulse  Av  Min: 75  Max: 104   Blood Pressure: 149/74 mmHg     Respiratory:    Respiration: 16, Pulse Oximetry: 96 %        RUL Breath Sounds: Clear, RML Breath Sounds: Clear, RLL Breath Sounds: Clear, MARY Breath Sounds: Clear, LLL Breath Sounds: Clear  Fluids:    Intake/Output Summary (Last 24 hours) at 17 1411  Last data filed at 17 0400   Gross per 24 hour   Intake      0 ml   Output   2150 ml   Net  -2150 ml        GI/Nutrition:  Orders Placed This Encounter   Procedures   • Diet Order     Standing Status: Standing      Number of Occurrences: 1      Standing Expiration Date:      Order Specific Question:  Diet:     Answer:  Diabetic [3]     CT head w/o  Lateral ventricles are normal in size and symmetric.  Cortical sulci are within normal limits.  No significant mass effect or midline shift.  Basal cisterns are patent.  No evidence for intracranial hemorrhage.  Calvaria are intact.  Visualized orbits are unremarkable.  Visualized mastoid air cells are clear.  Visualized paranasal sinuses are unremarkable.      Medical Decision Making, by Problem:  Active Hospital Problems    Diagnosis   • • Leg weakness, bilateral [M62.81]   - unknown etiology, multiple admissions in past which responded well to steroids  - questinable mitochondrial disease though biopsy did not prove this  - stop IV steroids. Transition to PO steroids. Continue this.   - Neurology on board. Seen by Dr Reno. Recommendations per Neurology.   - Already improving. Trying to ambulate now.   - MRI cannot be done due to gastric stimulator  - Continue aspirin and statin for concern of stroke, though less likely for now  - PT/OT  - Patient reports to be improving.    HTN (hypertension) [I10]  - Well controlled  - Continue home regimen   • Chronic  pain syndrome [G89.4]  - Continue home regimen with PRN supplementation for acute issues   • Psychosis, schizophrenia, simple ([facility]) [F20.89]  - Continue Geodon   • Hypothyroidism [E03.9]  - Synthroid   • Weakness of right upper extremity [M62.81]  - As noted above  - Consult from neurology requested.        • Chronic suprapubic catheter (CMS-HCC) [Z93.59]  - Cx with enterococcus. Started Macrobid.    • 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]  - Hx of    • GERD (gastroesophageal reflux disease) [K21.9]  - Continue pepcid   • Morbidly obese (CMS-HCC) [E66.01]  - Body mass index is 48.28 kg/(m^2).   • Peripheral neuropathy (CMS-HCC) [G62.9]  - Continue gabapentin   = Steroid induced hyperglycemia: SSI  = Disposition: Continue PT/OT. Disposition needs to be determined.     Labs reviewed, Medications reviewed and Radiology images reviewed  Andrade catheter: Urinary Tract Retention or Urinary Tract Obstruction      DVT Prophylaxis: Heparin  DVT prophylaxis - mechanical: SCDs  Ulcer prophylaxis: Not indicated  Antibiotics: Treating active infection/contamination beyond 24 hours perioperative coverage  Assessed for rehab: Patient was assess for and/or received rehabilitation services during this hospitalization

## 2017-02-26 NOTE — PROGRESS NOTES
Pt refuses bed alarm despite education on the importance of having the bed alarm in place. Pt educated on the fall risk including the risk for injury and possibly extending the length of their hospital stay. Pt continues to refuse bed alarm and states the understanding of the importance of the alarm.   All other fall precautions are in place: bed in lowest position, call light within reach, and treaded socks on patient

## 2017-02-27 ENCOUNTER — PATIENT OUTREACH (OUTPATIENT)
Dept: HEALTH INFORMATION MANAGEMENT | Facility: OTHER | Age: 27
End: 2017-02-27

## 2017-02-27 ENCOUNTER — APPOINTMENT (OUTPATIENT)
Dept: PHYSICAL THERAPY | Facility: REHABILITATION | Age: 27
End: 2017-02-27
Attending: INTERNAL MEDICINE
Payer: MEDICARE

## 2017-02-27 ENCOUNTER — HOME HEALTH ADMISSION (OUTPATIENT)
Dept: HOME HEALTH SERVICES | Facility: HOME HEALTHCARE | Age: 27
End: 2017-02-27
Payer: MEDICARE

## 2017-02-27 VITALS
BODY MASS INDEX: 48.28 KG/M2 | OXYGEN SATURATION: 100 % | RESPIRATION RATE: 20 BRPM | SYSTOLIC BLOOD PRESSURE: 135 MMHG | TEMPERATURE: 98.2 F | DIASTOLIC BLOOD PRESSURE: 75 MMHG | HEIGHT: 63 IN | HEART RATE: 85 BPM | WEIGHT: 272.49 LBS

## 2017-02-27 LAB
GLUCOSE BLD-MCNC: 166 MG/DL (ref 65–99)
GLUCOSE BLD-MCNC: 77 MG/DL (ref 65–99)
THYROGLOB AB SERPL-ACNC: <0.9 IU/ML (ref 0–4)

## 2017-02-27 PROCEDURE — 99239 HOSP IP/OBS DSCHRG MGMT >30: CPT | Performed by: INTERNAL MEDICINE

## 2017-02-27 PROCEDURE — 700111 HCHG RX REV CODE 636 W/ 250 OVERRIDE (IP): Performed by: INTERNAL MEDICINE

## 2017-02-27 PROCEDURE — 700102 HCHG RX REV CODE 250 W/ 637 OVERRIDE(OP): Performed by: INTERNAL MEDICINE

## 2017-02-27 PROCEDURE — A9270 NON-COVERED ITEM OR SERVICE: HCPCS | Performed by: INTERNAL MEDICINE

## 2017-02-27 PROCEDURE — 82962 GLUCOSE BLOOD TEST: CPT

## 2017-02-27 RX ORDER — NITROFURANTOIN 25; 75 MG/1; MG/1
100 CAPSULE ORAL 2 TIMES DAILY WITH MEALS
Qty: 14 CAP | Refills: 0 | Status: SHIPPED | OUTPATIENT
Start: 2017-02-27 | End: 2017-03-06

## 2017-02-27 RX ORDER — PREDNISONE 10 MG/1
TABLET ORAL
Qty: 20 TAB | Refills: 0 | Status: SHIPPED | OUTPATIENT
Start: 2017-02-27 | End: 2017-03-09

## 2017-02-27 RX ORDER — ATORVASTATIN CALCIUM 40 MG/1
40 TABLET, FILM COATED ORAL
Qty: 30 TAB | Refills: 0 | Status: SHIPPED | OUTPATIENT
Start: 2017-02-27 | End: 2017-04-18

## 2017-02-27 RX ADMIN — SODIUM BICARBONATE TAB 650 MG 650 MG: 650 TAB at 08:01

## 2017-02-27 RX ADMIN — MORPHINE SULFATE 4 MG: 4 INJECTION INTRAVENOUS at 05:41

## 2017-02-27 RX ADMIN — CYCLOBENZAPRINE HYDROCHLORIDE 10 MG: 10 TABLET, FILM COATED ORAL at 08:01

## 2017-02-27 RX ADMIN — PREDNISONE 50 MG: 50 TABLET ORAL at 08:01

## 2017-02-27 RX ADMIN — FUROSEMIDE 20 MG: 20 TABLET ORAL at 08:01

## 2017-02-27 RX ADMIN — OMEPRAZOLE 20 MG: 20 CAPSULE, DELAYED RELEASE ORAL at 05:40

## 2017-02-27 RX ADMIN — FAMOTIDINE 20 MG: 20 TABLET, FILM COATED ORAL at 08:01

## 2017-02-27 RX ADMIN — ALBUTEROL SULFATE 2 PUFF: 90 AEROSOL, METERED RESPIRATORY (INHALATION) at 08:02

## 2017-02-27 RX ADMIN — NITROFURANTOIN MONOHYDRATE AND NITROFURANTOIN MACROCRYSTALLINE 100 MG: 75; 25 CAPSULE ORAL at 08:01

## 2017-02-27 RX ADMIN — INSULIN LISPRO 2 UNITS: 100 INJECTION, SOLUTION INTRAVENOUS; SUBCUTANEOUS at 12:13

## 2017-02-27 RX ADMIN — GABAPENTIN 1200 MG: 600 TABLET ORAL at 10:00

## 2017-02-27 RX ADMIN — FLUOXETINE 10 MG: 10 CAPSULE ORAL at 08:01

## 2017-02-27 RX ADMIN — CYANOCOBALAMIN TAB 500 MCG 1000 MCG: 500 TAB at 08:02

## 2017-02-27 RX ADMIN — OXYCODONE HYDROCHLORIDE AND ACETAMINOPHEN 2 TABLET: 10; 325 TABLET ORAL at 11:34

## 2017-02-27 RX ADMIN — ASPIRIN 325 MG: 325 TABLET, COATED ORAL at 08:01

## 2017-02-27 RX ADMIN — LEVOTHYROXINE SODIUM 75 MCG: 75 TABLET ORAL at 05:40

## 2017-02-27 RX ADMIN — OXYCODONE HYDROCHLORIDE AND ACETAMINOPHEN 2 TABLET: 10; 325 TABLET ORAL at 03:52

## 2017-02-27 RX ADMIN — HEPARIN SODIUM 5000 UNITS: 5000 INJECTION, SOLUTION INTRAVENOUS; SUBCUTANEOUS at 05:40

## 2017-02-27 ASSESSMENT — LIFESTYLE VARIABLES: EVER_SMOKED: NEVER

## 2017-02-27 ASSESSMENT — PAIN SCALES - GENERAL
PAINLEVEL_OUTOF10: 8
PAINLEVEL_OUTOF10: 8

## 2017-02-27 NOTE — PROGRESS NOTES
Assumed care of pt; pt aox4; pt reports pain 8/10, will medicate per MAR; pt lying in bed, eating breakfast, refusing to get to chair; pt skinner; VSS; pt reports numbness in BLE; 5/5 strength LUE, 4/5 LLE, RUE, RLE; pt reports cannot turn self, requests assistance with turns, encouragement provided; anticipated discharge today; safety precautions in place; hourly rounding

## 2017-02-27 NOTE — PROGRESS NOTES
Rounded with day nurse. Grandmother at bs. Pt shows no s/s of distress at this time. Resting comfortably. Ice packs refilled. Plan of care discussed with pt. Pain medication schedule also reviewed with pt.

## 2017-02-27 NOTE — FACE TO FACE
Face to Face Note  -  Durable Medical Equipment    Earl Meehan M.D. - NPI: [id number]  I certify that this patient is under my care and that they had a durable medical equipment(DME)face to face encounter by myself that meets the physician DME face-to-face encounter requirements with this patient on:    Date of encounter:   Patient:                    MRN:                       YOB: 2018  Kristin Balderrama  [id number]  1990     The encounter with the patient was in whole, or in part, for the following medical condition, which is the primary reason for durable medical equipment:  Other - Hypoxia, Obesity hypoventilation syndrome, Obstructive sleep apnea    I certify that, based on my findings, the following durable medical equipment is medically necessary:  Oxygen.    HOME O2 Saturation Measurements:(Values must be present for Home Oxygen orders)  Room air sat at rest: 90  Room air sat with amb: 88  With liters of O2: 2, O2 sat at rest with O2: 98  With Liters of O2: 1.5, O2 sat with amb with O2 : 98  Is the patient mobile?: Yes    My Clinical findings support the need for the above equipment due to:  Hypoxia Obesity hypoventilation syndrome, Obstructive sleep apnea    Supporting Symptoms: Hypoxemic with ambulation requiring home o2

## 2017-02-27 NOTE — DISCHARGE SUMMARY
C O N F I D E N T I A L I N F O R M A T I O N   -------------------------------------------------------------------------------------------------------------------   DISCHARGE SUMMARY    Patient ID:  Kristin Balderrama  [id number]  26 y.o.female  1990    Admit date: 2/22/2017    Discharge date and time: 02/27/2017    Admitting Physician: Herbert Casas M.D.    Discharge Physician: Earl Meehan    CODE STATUS: FULL CODE    Admission Diagnoses: Leg weakness, bilateral    Discharge Diagnoses:   1) B/L LE weakness  2) B/L UE weakness  3) Hx Conversion disorder  4) Above responsive to use of IV steroids  5) Chronic pain syndrome  6) Recurrent hospitalizations  7) Obesity, Morbid  8) Urinary retention s/p placement of suprapubic catheter  9) Hypertension  10) Hypothyroidism  11) Psychosis / Szhizophrenia  12) Peripheral neuropathy  13) GERD  14) Insomnia    Admission Condition: poor    Discharged Condition: good    Indication for Admission: Weakness    Hospital Course: 26 y.o. female admitted 2/22/2017 with a CC of lower extremity weakness and admitting dx of Lower extremity weakness. Patient known to have PMH significant for recurrent hospital admissions for the same reason. She has been following with Dr Fox here in outpatient neurology clinic and [facility]. She responds very quickly to use of intravenous steroids in the hospital stay with notable improvement in her symptoms. There was concern for mitochondrial diseases as the etiology and she recently had a muscle biopsy done in the outpatient setting which failed to reveal a diagnosis. She was initiated on IV steroids and these were quickly transitioned to oral steroids. Neurology consultation was obtained. Patient was evaluated by Dr Reno and Dr Fox during the hospital course. From Dr Fox's perspective recommend monthly infusion of 1 g solumedrol. Patient with rapid recovery in her symptoms after administration of IV solumedrol. She was walking with a walker  and back to baseline. Patient eager to go home. Provided the patient with a quick steroid taper on discharge. Off note UTI 2/2 SPC during the hospital and Macrobid prescribed on discharge. [place] f/[place] for SPC change which patient reported last changed 3 weeks ago. Otherwise patient slightly hypoxic. Advised discussion of this with her sleep clinic. DME for oxygen written on discharge. In addition HHC ordered and case discussed with SW for arrangement of HHC. Patient back to baseline and stable. Patient discharged home in stable condition with plans to maintain close follow up in outpatient setting.     Things to follow up after discharge:   1) Follow up with Dr Fox in outpatient clinic. He has recommend monthly infusion of intravenous solumedrol. This can be arranged by Dr Fox at the outpatient infusion center. Also maintain follow up at [facility] neurology clinics.   2) Follow up with [facility] [place] for suprapubic cathter exchange.   3) Continue Macrobid for another seven days as prescribed.   4) Take prednisone 50 mg for one day, Then 40 mg for one day, Then 30 mg for one day, Then 20 mg for one day, Then 10 mg for one day and then stop.   5) Start baby aspirin and atorvastatin.   6) Apply miconazole cream to the catheter site skin.  7) Call your PCP office and setup a follow up appointment within one to two weeks of hospital stay.   8) Follow up with sleep clinic.     Consults: neurology    Significant Studies:   CT-HEAD W/O   Final Result      No acute intracranial abnormality.      DX-KNEE COMPLETE 4+ RIGHT   Final Result      No fracture or dislocation of RIGHT knee.      DX-CHEST-PORTABLE (1 VIEW)   Final Result      Hypoinflation without other evidence for acute cardiopulmonary disease.      NM-SPECT BRAIN IMAGING    (Results Pending)       Procedures Performed While Hospitalized: None    Operations During Hospitalization: None    Disposition: Home    Patient  Instructions: Given  Activity: activity as tolerated  Diet: regular diet  Wound Care: as directed    Medications at discharge:   Kristin Balderrama   Home Medication Instructions DANIAL:[id number]    Printed on:02/27/17 2333   Medication Information                      albuterol 108 (90 BASE) MCG/ACT Aero Soln inhalation aerosol  Inhale 2 Puffs by mouth every 6 hours as needed for Shortness of Breath.             aspirin EC (ECOTRIN) 81 MG Tablet Delayed Response  Take 1 Tab by mouth every day.             atorvastatin (LIPITOR) 40 MG Tab  Take 1 Tab by mouth every bedtime.             cyanocobalamin (HM VITAMIN B12) 500 MCG tablet  Take 1,000 mcg by mouth 2 times a day.             cyclobenzaprine (FLEXERIL) 10 MG Tab  Take 10 mg by mouth 2 Times a Day. Indications: Muscle Spasm             fentanyl (DURAGESIC) 25 MCG/HR PATCH 72 HR  Apply 1 Patch to skin as directed every 72 hours.             fluoxetine (PROZAC) 10 MG Cap  Take 1 Cap by mouth every day.             furosemide (LASIX) 20 MG Tab  Take 20 mg by mouth See Admin Instructions. Is taking 20 mg BID X2 days ONLY (on 2/21 and 2/22/17) then 1 tablet a day thereafter             Ivabradine HCl (CORLANOR) 5 MG Tab  Take 1 Tab by mouth 2 Times a Day.             lactulose 10 GM/15ML Solution  Take 10 g by mouth 2 times a day.             levothyroxine (SYNTHROID) 75 MCG Tab  Take 75 mcg by mouth Every morning on an empty stomach.             miconazole (MICOTIN) 2 % Cream  Apply 1 Application to affected area(s) 2 times a day.             nitrofurantoin monohydr macro (MACROBID) 100 MG Cap  Take 1 Cap by mouth 2 times a day, with meals for 7 days.             omeprazole (PRILOSEC) 20 MG delayed-release capsule  Take 20 mg by mouth 2 times a day.             oxycodone-acetaminophen (PERCOCET-10)  MG Tab  Take 2 Tabs by mouth every 6 hours. Two tabs  Indications: Pain             predniSONE (DELTASONE) 10 MG Tab  Take 50 mg for one day, Then 40 mg for one  day, Then 30 mg for one day, Then 20 mg for one day, Then 10 mg for one day and stop             promethazine (PHENERGAN) 25 MG Tab  Take 1 Tab by mouth every 6 hours as needed for Nausea/Vomiting.             ranitidine (ZANTAC) 150 MG Tab  Take 150 mg by mouth 2 times a day.             sodium bicarbonate 325 MG Tab  Take 2 Tabs by mouth 3 times a day.             vitamin D, Ergocalciferol, (DRISDOL) 70164 UNITS Cap capsule  Take 50,000 Units by mouth every Friday.             Zinc Oxide 11.3 % Cream  Apply 1 Application to affected area(s) 1 time daily as needed (for skin irritations to coccyx).             ziprasidone (GEODON) 80 MG Cap  Take 160 mg by mouth every evening.                 Opioid prescription history checked: N/A. None prescribed.     Time spend preparing discharge: 40 minutes. This included face to face with the patient, medication reconciliation, care co ordination with RN / Family involved in patient care and discussion and co ordination with case management.     Signed:  Earl Meehan  2/27/2017  12:39 PM

## 2017-02-27 NOTE — PROGRESS NOTES
Discharge papers reviewed with pt, RX given; pt home with O2 and HH  Pt wheeled out with volunteer

## 2017-02-27 NOTE — DISCHARGE PLANNING
Received choice forms Khari). Referral sent to [facility] and [facility].

## 2017-02-27 NOTE — DISCHARGE PLANNING
SW received HH order and O2 order.  Pt would chose [facility] per KRYSTIAN Wall's note.  Pt stated that she was previously on service with [facility].  KRYSTIAN faxed choice forms to [facility].

## 2017-02-27 NOTE — DISCHARGE PLANNING
Followed up with [facility]. Spoke to Anthony who states they have accepted patient and will be delivering equipment. [facility] still pending.

## 2017-02-27 NOTE — DISCHARGE PLANNING
KRYSTIAN spoke with RN who advised pt is ready to leave and is pending HH. SW call to [facility] and was advised they just accepted pt.

## 2017-02-27 NOTE — DISCHARGE INSTRUCTIONS
Discharge Instructions    Discharged to home by car with relative. Discharged via wheelchair, hospital escort: Yes.  Special equipment needed: Oxygen    Be sure to schedule a follow-up appointment with your primary care doctor or any specialists as instructed.     Discharge Plan:   Diet Plan: Discussed  Activity Level: Discussed  Confirmed Follow up Appointment: Patient to Call and Schedule Appointment  Confirmed Symptoms Management: Discussed  Medication Reconciliation Updated: Yes  Influenza Vaccine Indication: Not indicated: Previously immunized this influenza season and > 8 years of age    I understand that a diet low in cholesterol, fat, and sodium is recommended for good health. Unless I have been given specific instructions below for another diet, I accept this instruction as my diet prescription.   Other diet: diabetic    Special Instructions: None    · Is patient discharged on Warfarin / Coumadin?   No     · Is patient Post Blood Transfusion?  No    p clinic.        Depression / Suicide Risk  Comments: 1) Follow up with Dr Fox in outpatient clinic. He has recommend monthly infusion of intravenous solumedrol. This can be arranged by Dr Fox at the outpatient infusion center. Also maintain follow up at [facility] neurology clinics.     2) Follow up with [facility] [place] for suprapubic cathter exchange.     3) Continue Macrobid for another seven days as prescribed.     4) Take prednisone 50 mg for one day, Then 40 mg for one day, Then 30 mg for one day, Then 20 mg for one day, Then 10 mg for one day and then stop.     5) Start baby aspirin and atorvastatin.     6) Apply miconazole cream to the catheter site skin.   7) Call your PCP office and setup a follow up appointment within one to two weeks of hospital stay.     8) Follow up with slee  As you are discharged from this [facility] facility, it is important to learn how to keep safe from harming yourself.    Recognize the warning signs:  · Abrupt changes in  personality, positive or negative- including increase in energy   · Giving away possessions  · Change in eating patterns- significant weight changes-  positive or negative  · Change in sleeping patterns- unable to sleep or sleeping all the time   · Unwillingness or inability to communicate  · Depression  · Unusual sadness, discouragement and loneliness  · Talk of wanting to die  · Neglect of personal appearance   · Rebelliousness- reckless behavior  · Withdrawal from people/activities they love  · Confusion- inability to concentrate     If you or a loved one observes any of these behaviors or has concerns about self-harm, here's what you can do:  · Talk about it- your feelings and reasons for harming yourself  · Remove any means that you might use to hurt yourself (examples: pills, rope, extension cords, firearm)  · Get professional help from the community (Mental Health, Substance Abuse, psychological counseling)  · Do not be alone:Call your Safe Contact- someone whom you trust who will be there for you.  · Call your local CRISIS HOTLINE 530-5588 or 206-020-8826  · Call your local Children's Mobile Crisis Response Team [place] (280) 011-0900 or www.Movable  · Call the toll free National Suicide Prevention Hotlines   · National Suicide Prevention Lifeline 615-166-ROCR (0617)  · National Hope Line Network 800-SUICIDE (596-4327)

## 2017-02-28 ENCOUNTER — APPOINTMENT (OUTPATIENT)
Dept: PHYSICAL THERAPY | Facility: REHABILITATION | Age: 27
End: 2017-02-28
Attending: INTERNAL MEDICINE
Payer: MEDICARE

## 2017-02-28 ENCOUNTER — HOME CARE VISIT (OUTPATIENT)
Dept: HOME HEALTH SERVICES | Facility: HOME HEALTHCARE | Age: 27
End: 2017-02-28
Payer: MEDICARE

## 2017-02-28 VITALS
BODY MASS INDEX: 44.39 KG/M2 | DIASTOLIC BLOOD PRESSURE: 65 MMHG | HEIGHT: 64 IN | WEIGHT: 260 LBS | HEART RATE: 114 BPM | SYSTOLIC BLOOD PRESSURE: 118 MMHG | RESPIRATION RATE: 16 BRPM | TEMPERATURE: 97.6 F

## 2017-02-28 LAB
ANCA IGG TITR SER IF: NORMAL {TITER}
CARDIOLIPIN IGA SER IA-ACNC: 0 APL (ref 0–11)
CARDIOLIPIN IGG SER IA-ACNC: 0 GPL (ref 0–14)
CARDIOLIPIN IGM SER IA-ACNC: 0 MPL (ref 0–12)
ENA SS-B IGG SER IA-ACNC: 0 AU/ML (ref 0–40)
NUCLEAR IGG SER QL IA: NORMAL
SSA52 R0ENA AB IGG Q0420: 3 AU/ML (ref 0–40)
SSA60 R0ENA AB IGG Q0419: 33 AU/ML (ref 0–40)

## 2017-02-28 PROCEDURE — 665001 SOC-HOME HEALTH

## 2017-02-28 PROCEDURE — 665998 HH PPS REVENUE CREDIT

## 2017-02-28 PROCEDURE — 665999 HH PPS REVENUE DEBIT

## 2017-02-28 PROCEDURE — G0162 HHC RN E&M PLAN SVS, 15 MIN: HCPCS

## 2017-02-28 SDOH — ECONOMIC STABILITY: HOUSING INSECURITY
HOME SAFETY: AND CLEANING OF EQUIPMENT AND ENCOURAGE ADEQUATE DIET AND FLUID INTAKE.  PATIENT AND CAREGIVER ON STRATEGIES/MODIFICATIONS TO HOME ENVIRONMENT.    PATIENT AND CAREGIVER IN DEVELOPMENT OR REVISING OF PLAN AS INDICATED.  NO EVIDENCE FOUND OF SMOKING MA

## 2017-02-28 SDOH — ECONOMIC STABILITY: HOUSING INSECURITY
HOME SAFETY: SKIN, CHOKING PRECAUTIONS.  FREQUENT/PROPER HAND-WASHING TECHNIQUES, STANDARD PRECAUTIONS, AVOID CROWDS AND PERSONS WITH KNOWN INFECTIONS, STAYING CURRENT WITH IMMUNIZATIONS, S/S OF INFECTION, USE OF ANTIBIOTICS, SAFE DISPOSAL OF SUPPLIES, SAFE USE

## 2017-02-28 SDOH — ECONOMIC STABILITY: HOUSING INSECURITY
HOME SAFETY: UCT PATIENT AND CAREGIVER IN STRATEGIES TO PREVENT INFECTION:INSTRUCT PATIENT AND CAREGIVER IN STRATEGIES TO PREVENT INJURY - MODIFICATIONS TO THE HOME ENVIRONMENT, DECREASE CLUTTER, FREQUENT TURNING/REPOSITIONING, NOT TO USE ICE/HEAT DIRECTLY ON THE

## 2017-02-28 SDOH — ECONOMIC STABILITY: HOUSING INSECURITY: UNSAFE COOKING RANGE AREA: 0

## 2017-02-28 SDOH — ECONOMIC STABILITY: HOUSING INSECURITY: UNSAFE APPLIANCES: 0

## 2017-02-28 SDOH — ECONOMIC STABILITY: HOUSING INSECURITY
HOME SAFETY: OXYGEN SAFETY RISK ASSESSMENT PERFORMED. PATIENT PT WAS GIVEN A NO SMOKING SIGN AND PROVIDED EDUCATION ABOUT WHY IT IS IMPORTANT TO PLACE ONE. PATIENT INSTRUCTED PATIENT/CAREGIVER TO GET ONE AS SOON AS POSSIBLE. SMOKE ALARMS ARE PRESENT AND FUNCTIONA

## 2017-02-28 SDOH — ECONOMIC STABILITY: HOUSING INSECURITY: HOME SAFETY: TERIALS PRESENT IN THE HOME.

## 2017-02-28 SDOH — ECONOMIC STABILITY: HOUSING INSECURITY
HOME SAFETY: L ON EACH LEVEL OF THE HOME. PATIENT DOES HAVE A FIRE ESCAPE PLAN DEVELOPED. PATIENT DOES NOT HAVE FLAMMABLE MATERIALS PRESENT IN THE HOME PRESENTING A FIRE HAZARD. INSTRUCT IN PLANNING AND EXECUTION OF DISASTER/EVACUATION PLAN. ASSIST INSTRUCT INSTR

## 2017-02-28 ASSESSMENT — ACTIVITIES OF DAILY LIVING (ADL)
OASIS_M1830: 03
HOME_HEALTH_OASIS: 01

## 2017-02-28 ASSESSMENT — PATIENT HEALTH QUESTIONNAIRE - PHQ9
1. LITTLE INTEREST OR PLEASURE IN DOING THINGS: 02
2. FEELING DOWN, DEPRESSED, IRRITABLE, OR HOPELESS: 02

## 2017-02-28 ASSESSMENT — ENCOUNTER SYMPTOMS
THOUGHT CONTENT - SUSPICIOUS: 1
MENTAL STATUS CHANGE: 0

## 2017-03-01 ENCOUNTER — NON-PROVIDER VISIT (OUTPATIENT)
Dept: BEHAVIORAL HEALTH | Facility: PHYSICIAN GROUP | Age: 27
End: 2017-03-01
Payer: MEDICARE

## 2017-03-01 ENCOUNTER — HOME CARE VISIT (OUTPATIENT)
Dept: HOME HEALTH SERVICES | Facility: HOME HEALTHCARE | Age: 27
End: 2017-03-01
Payer: MEDICARE

## 2017-03-01 ENCOUNTER — APPOINTMENT (OUTPATIENT)
Dept: PHYSICAL THERAPY | Facility: REHABILITATION | Age: 27
End: 2017-03-01
Attending: INTERNAL MEDICINE
Payer: MEDICARE

## 2017-03-01 ENCOUNTER — HOSPITAL ENCOUNTER (OUTPATIENT)
Dept: CARDIOLOGY | Facility: MEDICAL CENTER | Age: 27
End: 2017-03-01
Attending: INTERNAL MEDICINE
Payer: MEDICARE

## 2017-03-01 DIAGNOSIS — F33.1 MAJOR DEPRESSIVE DISORDER, RECURRENT EPISODE, MODERATE (HCC): ICD-10-CM

## 2017-03-01 DIAGNOSIS — R00.0 TACHYCARDIA: ICD-10-CM

## 2017-03-01 PROCEDURE — 93306 TTE W/DOPPLER COMPLETE: CPT | Mod: 26 | Performed by: INTERNAL MEDICINE

## 2017-03-01 PROCEDURE — 665999 HH PPS REVENUE DEBIT

## 2017-03-01 PROCEDURE — 93306 TTE W/DOPPLER COMPLETE: CPT

## 2017-03-01 PROCEDURE — 90834 PSYTX W PT 45 MINUTES: CPT | Performed by: SOCIAL WORKER

## 2017-03-01 PROCEDURE — 665998 HH PPS REVENUE CREDIT

## 2017-03-01 NOTE — BH THERAPY
Pt states just released from [facility] after five days - had another episode of stroke-like sxs, unable to move.  Was treated w/IV steroids which helped.  Now on oxygen part of the time as well.  Very limited ADLs. Caregiver now coming to home in am to help w/shower/dress etc.  Pt states she sleeps off/on throughout the day.  Lives w/grandparents, aunt and uncle.  Lots of conflict at home.  Closest to grandmother, but they argue frequently.  Pt states psychotherapy visit helpful for support.     [facility]  Therapy Progress Note    Patient Name: Kristin Balderrama  Patient MRN: [id number]  Today's Date: 3/1/2017     Type of session:Individual psychotherapy  Length of session: 45  Persons in attendance:Patient    Subjective/New Info: Ongoing chronic medical issues, not improving    Objective/Observations:   Participation: Active verbal participation   Grooming: Dressed in nightgown, oxygen cannula, uses walker   Cognition: Fully Oriented   Eye contact: Good   Mood: Depressed   Affect: Congruent with content   Thought process: Goal-directed   Speech: Rate within normal limits   Other:     Diagnoses:   1. Major depressive disorder, recurrent episode, moderate (CMS-HCC)         Current risk:   SUICIDE: Low   Homicide: Low   Self-harm: Low   Relapse: Not applicable   Other:    Safety Plan reviewed? Not Indicated   If evidence of imminent risk is present, intervention/plan:     Therapeutic Intervention(s): Supportive psychotherapy    Treatment Goal(s)/Objective(s) addressed: Supportive tx     Progress toward Treatment Goals: No change    Plan:  - Continue Individual therapy as needed    Blanca Brantley L.C.S.W.  3/1/2017

## 2017-03-02 ENCOUNTER — APPOINTMENT (OUTPATIENT)
Dept: PHYSICAL THERAPY | Facility: REHABILITATION | Age: 27
End: 2017-03-02
Attending: INTERNAL MEDICINE
Payer: MEDICARE

## 2017-03-02 LAB
CRYOGLOB SER QL 3D COLD INC: NORMAL
LV EJECT FRACT  99904: 60
LV EJECT FRACT MOD 2C 99903: 76.87
LV EJECT FRACT MOD 4C 99902: 64.25
LV EJECT FRACT MOD BP 99901: 68.07

## 2017-03-02 PROCEDURE — 665999 HH PPS REVENUE DEBIT

## 2017-03-02 PROCEDURE — 665998 HH PPS REVENUE CREDIT

## 2017-03-03 ENCOUNTER — HOME CARE VISIT (OUTPATIENT)
Dept: HOME HEALTH SERVICES | Facility: HOME HEALTHCARE | Age: 27
End: 2017-03-03
Payer: MEDICARE

## 2017-03-03 VITALS
DIASTOLIC BLOOD PRESSURE: 80 MMHG | TEMPERATURE: 96.7 F | RESPIRATION RATE: 24 BRPM | SYSTOLIC BLOOD PRESSURE: 126 MMHG | HEART RATE: 106 BPM

## 2017-03-03 VITALS
HEART RATE: 103 BPM | RESPIRATION RATE: 16 BRPM | TEMPERATURE: 97.5 F | DIASTOLIC BLOOD PRESSURE: 110 MMHG | SYSTOLIC BLOOD PRESSURE: 143 MMHG

## 2017-03-03 PROCEDURE — 665998 HH PPS REVENUE CREDIT

## 2017-03-03 PROCEDURE — 665999 HH PPS REVENUE DEBIT

## 2017-03-03 PROCEDURE — G0496 LPN CARE TRAIN/EDU IN HH: HCPCS

## 2017-03-03 PROCEDURE — G0151 HHCP-SERV OF PT,EA 15 MIN: HCPCS

## 2017-03-03 SDOH — ECONOMIC STABILITY: HOUSING INSECURITY: UNSAFE COOKING RANGE AREA: 0

## 2017-03-03 SDOH — ECONOMIC STABILITY: HOUSING INSECURITY: UNSAFE APPLIANCES: 0

## 2017-03-03 ASSESSMENT — GAIT ASSESSMENTS
RIGHT STEP CLEAR: 0
INITIATION OF GAIT IMMEDIATELY AFTER GO: 1
PATH: 0
TRUNK: 0
LEFT STEP PASS: 1
TIME: 0
LEFT STEP CLEAR: 0
GAIT SCORE: 3
SURVEY COMPLETE: TRUE
BALANCE AND GAIT SCORE: 10
STEP SYMMETRY: 0
RIGHT STEP PASS: 1
STEP CONTINUITY: 0

## 2017-03-03 ASSESSMENT — BALANCE ASSESSMENTS
EYES CLOSED AT MAXIMUM POSITION NUDGED: 0
SITTING DOWN: 1
IMMEDIATE STANDING BALANCE FIRST 5 SECONDS: 1
TURNING 360 DEGREES STEPS: 0
ARISES: 1
BALANCE SCORE: 7
SURVEY COMPLETE: TRUE
STANDING BALANCE: 1
TURNING 360 DEGREES STEADINESS: 1
ATTEMPTS TO ARISE: 1
SITTING BALANCE: 1
NUDGED: 0

## 2017-03-03 ASSESSMENT — ENCOUNTER SYMPTOMS
MENTAL STATUS CHANGE: 0
SHORTNESS OF BREATH: T
MENTAL STATUS CHANGE: 0

## 2017-03-03 ASSESSMENT — ACTIVITIES OF DAILY LIVING (ADL)
IADLS_COMMENTS: <!--EPICS-->SEE OT REPORT<!--EPICE-->
ADLS_COMMENTS: <!--EPICS-->SEE OT REPORT<!--EPICE-->

## 2017-03-04 ENCOUNTER — HOME CARE VISIT (OUTPATIENT)
Dept: HOME HEALTH SERVICES | Facility: HOME HEALTHCARE | Age: 27
End: 2017-03-04
Payer: MEDICARE

## 2017-03-04 VITALS
DIASTOLIC BLOOD PRESSURE: 72 MMHG | RESPIRATION RATE: 20 BRPM | HEART RATE: 71 BPM | TEMPERATURE: 206.4 F | SYSTOLIC BLOOD PRESSURE: 122 MMHG

## 2017-03-04 PROCEDURE — 665998 HH PPS REVENUE CREDIT

## 2017-03-04 PROCEDURE — G0495 RN CARE TRAIN/EDU IN HH: HCPCS

## 2017-03-04 PROCEDURE — 665999 HH PPS REVENUE DEBIT

## 2017-03-05 PROCEDURE — 665999 HH PPS REVENUE DEBIT

## 2017-03-05 PROCEDURE — 665998 HH PPS REVENUE CREDIT

## 2017-03-06 ENCOUNTER — TELEPHONE (OUTPATIENT)
Dept: NEUROLOGY | Facility: MEDICAL CENTER | Age: 27
End: 2017-03-06

## 2017-03-06 ENCOUNTER — PATIENT MESSAGE (OUTPATIENT)
Dept: NEUROLOGY | Facility: MEDICAL CENTER | Age: 27
End: 2017-03-06

## 2017-03-06 ENCOUNTER — APPOINTMENT (OUTPATIENT)
Dept: PHYSICAL THERAPY | Facility: REHABILITATION | Age: 27
End: 2017-03-06
Attending: INTERNAL MEDICINE
Payer: MEDICARE

## 2017-03-06 PROCEDURE — 665998 HH PPS REVENUE CREDIT

## 2017-03-06 PROCEDURE — 665999 HH PPS REVENUE DEBIT

## 2017-03-06 ASSESSMENT — ENCOUNTER SYMPTOMS
VOMITING: DENIES
NAUSEA: DENIES

## 2017-03-06 NOTE — TELEPHONE ENCOUNTER
Pt is sending this message via [employer] e-mail. Please advise Infusion orders needed. Thank you. HU Balderrama has finished the steroid RX  sent home from the hospital.  When should she start the monthly steroid infusion treatment you talked about?  Will the infusion center be contacting us with an appointment or do we need to do  something?

## 2017-03-07 ENCOUNTER — HOSPITAL ENCOUNTER (OUTPATIENT)
Facility: MEDICAL CENTER | Age: 27
DRG: 698 | End: 2017-03-07
Attending: INTERNAL MEDICINE
Payer: MEDICARE

## 2017-03-07 ENCOUNTER — APPOINTMENT (OUTPATIENT)
Dept: PHYSICAL THERAPY | Facility: REHABILITATION | Age: 27
End: 2017-03-07
Attending: INTERNAL MEDICINE
Payer: MEDICARE

## 2017-03-07 ENCOUNTER — HOME CARE VISIT (OUTPATIENT)
Dept: HOME HEALTH SERVICES | Facility: HOME HEALTHCARE | Age: 27
End: 2017-03-07
Payer: MEDICARE

## 2017-03-07 ENCOUNTER — TELEPHONE (OUTPATIENT)
Dept: CARDIOLOGY | Facility: MEDICAL CENTER | Age: 27
End: 2017-03-07

## 2017-03-07 VITALS
DIASTOLIC BLOOD PRESSURE: 70 MMHG | TEMPERATURE: 97.7 F | SYSTOLIC BLOOD PRESSURE: 124 MMHG | HEART RATE: 100 BPM | RESPIRATION RATE: 18 BRPM

## 2017-03-07 LAB
25(OH)D3 SERPL-MCNC: 30 NG/ML (ref 30–100)
ALBUMIN SERPL BCP-MCNC: 4.6 G/DL (ref 3.2–4.9)
APPEARANCE UR: ABNORMAL
BASOPHILS # BLD AUTO: 0.05 K/UL (ref 0–0.12)
BASOPHILS NFR BLD AUTO: 0.4 % (ref 0–1.8)
BILIRUB UR QL STRIP.AUTO: NEGATIVE
BUN SERPL-MCNC: 13 MG/DL (ref 8–22)
CALCIUM SERPL-MCNC: 10.1 MG/DL (ref 8.5–10.5)
CHLORIDE SERPL-SCNC: 101 MMOL/L (ref 96–112)
CHOLEST SERPL-MCNC: 170 MG/DL (ref 100–199)
CO2 SERPL-SCNC: 22 MMOL/L (ref 20–33)
COLOR UR AUTO: ABNORMAL
CREAT SERPL-MCNC: 0.7 MG/DL (ref 0.5–1.4)
CREAT UR-MCNC: 144.4 MG/DL
EOSINOPHIL # BLD: 0.17 K/UL (ref 0–0.51)
EOSINOPHIL NFR BLD AUTO: 1.2 % (ref 0–6.9)
EPITHELIAL CELLS 1715: ABNORMAL /HPF
ERYTHROCYTE [DISTWIDTH] IN BLOOD BY AUTOMATED COUNT: 45.1 FL (ref 35.9–50)
GLUCOSE SERPL-MCNC: 103 MG/DL (ref 65–99)
GLUCOSE UR STRIP.AUTO-MCNC: NEGATIVE MG/DL
HCT VFR BLD AUTO: 45.5 % (ref 37–47)
HDLC SERPL-MCNC: 80 MG/DL
HGB BLD-MCNC: 14.7 G/DL (ref 12–16)
IMM GRANULOCYTES # BLD AUTO: 0.34 K/UL (ref 0–0.11)
IMM GRANULOCYTES NFR BLD AUTO: 2.4 % (ref 0–0.9)
KETONES UR STRIP.AUTO-MCNC: NEGATIVE MG/DL
LDLC SERPL CALC-MCNC: 48 MG/DL
LEUKOCYTE ESTERASE UR QL STRIP.AUTO: ABNORMAL
LYMPHOCYTES # BLD: 3.88 K/UL (ref 1–4.8)
LYMPHOCYTES NFR BLD AUTO: 27.4 % (ref 22–41)
MAGNESIUM SERPL-MCNC: 1.9 MG/DL (ref 1.5–2.5)
MCH RBC QN AUTO: 29.5 PG (ref 27–33)
MCHC RBC AUTO-ENTMCNC: 32.3 G/DL (ref 33.6–35)
MCV RBC AUTO: 91.2 FL (ref 81.4–97.8)
MICRO URNS: ABNORMAL
MONOCYTES # BLD: 0.88 K/UL (ref 0–0.85)
MONOCYTES NFR BLD AUTO: 6.2 % (ref 0–13.4)
MUCOUS THREADS URNS QL MICRO: ABNORMAL /HPF
NEUTROPHILS # BLD: 8.85 K/UL (ref 2–7.15)
NEUTROPHILS NFR BLD AUTO: 62.4 % (ref 44–72)
NITRITE UR QL STRIP.AUTO: NEGATIVE
NRBC # BLD AUTO: 0.02 K/UL
NRBC BLD-RTO: 0.1 /100 WBC
PH UR: 6 [PH]
PHOSPHATE SERPL-MCNC: 3.7 MG/DL (ref 2.5–4.5)
PLATELET # BLD AUTO: 228 K/UL (ref 164–446)
PMV BLD AUTO: 11.8 FL (ref 9–12.9)
POTASSIUM SERPL-SCNC: 4.1 MMOL/L (ref 3.6–5.5)
PROT 24H UR-MRATE: 39.8 MG/DL (ref 0–15)
PROT UR QL STRIP: 70 MG/DL
PROT/CREAT UR: 276 MG/G (ref 10–107)
PTH-INTACT SERPL-MCNC: 75.4 PG/ML (ref 14–72)
RBC # BLD AUTO: 4.99 M/UL (ref 4.2–5.4)
RBC #/AREA URNS HPF: >150 /HPF
RBC UR QL AUTO: ABNORMAL
SODIUM SERPL-SCNC: 135 MMOL/L (ref 135–145)
SP GR UR STRIP.AUTO: 1.02
TRIGL SERPL-MCNC: 209 MG/DL (ref 0–149)
URATE SERPL-MCNC: 8.9 MG/DL (ref 1.9–8.2)
WBC # BLD AUTO: 14.2 K/UL (ref 4.8–10.8)
WBC #/AREA URNS HPF: ABNORMAL /HPF

## 2017-03-07 PROCEDURE — 665998 HH PPS REVENUE CREDIT

## 2017-03-07 PROCEDURE — G0299 HHS/HOSPICE OF RN EA 15 MIN: HCPCS

## 2017-03-07 PROCEDURE — G0151 HHCP-SERV OF PT,EA 15 MIN: HCPCS

## 2017-03-07 PROCEDURE — 665999 HH PPS REVENUE DEBIT

## 2017-03-07 ASSESSMENT — ENCOUNTER SYMPTOMS
SEVERE DYSPNEA: 1
DIFFICULTY THINKING: 1
MENTAL STATUS CHANGE: 0

## 2017-03-08 ENCOUNTER — APPOINTMENT (OUTPATIENT)
Dept: PHYSICAL THERAPY | Facility: REHABILITATION | Age: 27
End: 2017-03-08
Attending: INTERNAL MEDICINE
Payer: MEDICARE

## 2017-03-08 ENCOUNTER — OFFICE VISIT (OUTPATIENT)
Dept: MEDICAL GROUP | Facility: MEDICAL CENTER | Age: 27
End: 2017-03-08
Payer: MEDICARE

## 2017-03-08 VITALS
DIASTOLIC BLOOD PRESSURE: 70 MMHG | TEMPERATURE: 97.7 F | RESPIRATION RATE: 18 BRPM | SYSTOLIC BLOOD PRESSURE: 124 MMHG | HEART RATE: 100 BPM

## 2017-03-08 VITALS
HEIGHT: 62 IN | SYSTOLIC BLOOD PRESSURE: 138 MMHG | WEIGHT: 266 LBS | DIASTOLIC BLOOD PRESSURE: 88 MMHG | RESPIRATION RATE: 16 BRPM | BODY MASS INDEX: 48.95 KG/M2 | TEMPERATURE: 98.7 F | HEART RATE: 100 BPM | OXYGEN SATURATION: 98 %

## 2017-03-08 DIAGNOSIS — R29.898 WEAKNESS OF BOTH LOWER EXTREMITIES: ICD-10-CM

## 2017-03-08 PROCEDURE — 1111F DSCHRG MED/CURRENT MED MERGE: CPT | Performed by: INTERNAL MEDICINE

## 2017-03-08 PROCEDURE — G8482 FLU IMMUNIZE ORDER/ADMIN: HCPCS | Performed by: INTERNAL MEDICINE

## 2017-03-08 PROCEDURE — 665999 HH PPS REVENUE DEBIT

## 2017-03-08 PROCEDURE — G8432 DEP SCR NOT DOC, RNG: HCPCS | Performed by: INTERNAL MEDICINE

## 2017-03-08 PROCEDURE — G8419 CALC BMI OUT NRM PARAM NOF/U: HCPCS | Performed by: INTERNAL MEDICINE

## 2017-03-08 PROCEDURE — 99213 OFFICE O/P EST LOW 20 MIN: CPT | Performed by: INTERNAL MEDICINE

## 2017-03-08 PROCEDURE — 1036F TOBACCO NON-USER: CPT | Performed by: INTERNAL MEDICINE

## 2017-03-08 PROCEDURE — 665998 HH PPS REVENUE CREDIT

## 2017-03-08 SDOH — ECONOMIC STABILITY: HOUSING INSECURITY: UNSAFE APPLIANCES: 0

## 2017-03-08 SDOH — ECONOMIC STABILITY: HOUSING INSECURITY
HOME SAFETY: OXYGEN SAFETY RISK ASSESSMENT PERFORMED. PATIENT DOES HAVE A NO SMOKING SIGN POSTED IN THE HOME. PATIENT DOES HAVE A WORKING FIRE EXTINGUISHER PRESENT IN THE HOME. SMOKE ALARMS ARE PRESENT AND FUNCTIONAL ON EACH LEVEL OF THE HOME. PATIENT DOES HAVE A

## 2017-03-08 SDOH — ECONOMIC STABILITY: HOUSING INSECURITY
HOME SAFETY: FIRE ESCAPE PLAN DEVELOPED. PATIENT DOES NOT HAVE FLAMMABLE MATERIALS PRESENT IN THE HOME PRESENTING A FIRE HAZARD. NO EVIDENCE FOUND OF SMOKING MATERIALS PRESENT IN THE HOME.

## 2017-03-08 SDOH — ECONOMIC STABILITY: HOUSING INSECURITY: UNSAFE COOKING RANGE AREA: 0

## 2017-03-08 ASSESSMENT — ENCOUNTER SYMPTOMS
VOMITING: DENIES
NAUSEA: DENIES
MENTAL STATUS CHANGE: 0
RESPIRATORY SYMPTOMS COMMENTS: NO RESP. DISTRESS NOTED

## 2017-03-08 NOTE — PROGRESS NOTES
CC: Hospital follow-up weakness.    HPI:   Kristin presents today with the following.    1. Weakness of both lower extremities  Presents the ER with recurrent diffuse weakness. Again she was placed on steroid-induced and reported gradual improvement in symptoms she is followed by neurology and treatment plan is to give monthly steroid-induced fusion C3 can improve her symptomatology. She denies any fevers or chills no progressive swelling. She also just completed a course of oral and a biotics is followed by urology. She is getting home health and working with physical therapy.      Patient Active Problem List    Diagnosis Date Noted   • Paraparesis of both lower limbs (CMS-[facility]) 01/24/2017     Priority: High   • Quadriparesis (CMS-HCC) 11/01/2016     Priority: High   • Paralysis (CMS-HCC) 10/27/2016     Priority: High   • Sinus tachycardia 10/31/2013     Priority: High   • HTN (hypertension) 11/01/2016     Priority: Medium   • Chronic pain syndrome 10/27/2016     Priority: Medium   • Chronic inflammatory arthritis 05/23/2016     Priority: Medium   • Depression 10/28/2016     Priority: Low   • Schizophrenia (CMS-HCC) 10/27/2016     Priority: Low   • Suprapubic catheter (CMS-HCC) 10/27/2016     Priority: Low   • Psychosis, schizophrenia, simple (HCC) 09/29/2016     Priority: Low     Class: Chronic   • TONYA on CPAP 03/07/2016     Priority: Low   • Hypothyroidism 11/23/2015     Priority: Low   • PCOS (polycystic ovarian syndrome) 11/23/2015     Priority: Low   • Chronic back pain 06/29/2015     Priority: Low   • Progressive focal motor weakness 06/28/2015     Priority: Low   • Fatty liver disease, nonalcoholic 01/19/2015     Priority: Low   • Anxiety 12/16/2014     Priority: Low   • Knee pain, right 02/13/2014     Priority: Low   • Neurogenic bladder 04/02/2011     Priority: Low   • Chronic UTI 09/18/2010     Priority: Low   • Multiple personality disorder 09/18/2010     Priority: Low   • Weakness of right upper  extremity 02/23/2017   • Leg weakness, bilateral 02/22/2017   • Chronic suprapubic catheter (CMS-HCC) 02/16/2017   • Weakness 01/22/2017   • Leg weakness 01/04/2017   • Hypovitaminosis D 11/29/2016   • Bowel and bladder incontinence 10/27/2016   • Galactorrhea 07/22/2016   • Elevated sedimentation rate 06/27/2016   • Weakness of both lower extremities 06/22/2016   • Right flank pain 06/06/2016   • Vitamin D deficiency 05/21/2016   • Morbidly obese (CMS-HCC) 03/07/2016   • Conversion disorder 03/07/2016   • Scoliosis 03/07/2016   • GERD (gastroesophageal reflux disease) 03/07/2016   • Peripheral neuropathy (CMS-HCC) 03/06/2016   • H/O prior ablation treatment 02/10/2016       Current Outpatient Prescriptions   Medication Sig Dispense Refill   • Gabapentin, Once-Daily, (GRALISE) 600 MG Tab Take 600 mg by mouth 3 times a day.     • Melatonin 5 MG Tab Take 5 mg by mouth 1 time daily as needed (for sleep).     • ivabradine (CORLANOR) 5 MG Tab tablet Take 5 mg by mouth 2 times a day, with meals.     • atorvastatin (LIPITOR) 40 MG Tab Take 1 Tab by mouth every bedtime. 30 Tab 0   • aspirin EC (ECOTRIN) 81 MG Tablet Delayed Response Take 1 Tab by mouth every day. 30 Tab 0   • predniSONE (DELTASONE) 10 MG Tab Take 50 mg for one day, Then 40 mg for one day, Then 30 mg for one day, Then 20 mg for one day, Then 10 mg for one day and stop 20 Tab 0   • miconazole (MICOTIN) 2 % Cream Apply 1 Application to affected area(s) 2 times a day. 1 Tube 0   • ziprasidone (GEODON) 80 MG Cap Take 160 mg by mouth every evening.     • furosemide (LASIX) 20 MG Tab Take 20 mg by mouth See Admin Instructions. Is taking 20 mg BID X2 days ONLY (on 2/21 and 2/22/17) then 1 tablet a day thereafter     • fentanyl (DURAGESIC) 25 MCG/HR PATCH 72 HR Apply 1 Patch to skin as directed every 72 hours.     • lactulose 10 GM/15ML Solution Take 10 g by mouth 2 times a day.     • vitamin D, Ergocalciferol, (DRISDOL) 64308 UNITS Cap capsule Take 50,000 Units by  mouth every Friday.     • cyclobenzaprine (FLEXERIL) 10 MG Tab Take 10 mg by mouth 2 Times a Day. Indications: Muscle Spasm     • fluoxetine (PROZAC) 10 MG Cap Take 1 Cap by mouth every day. 30 Cap 1   • oxycodone-acetaminophen (PERCOCET-10)  MG Tab Take 2 Tabs by mouth every 6 hours. Two tabs  Indications: Pain     • promethazine (PHENERGAN) 25 MG Tab Take 1 Tab by mouth every 6 hours as needed for Nausea/Vomiting. 30 Tab 0   • albuterol 108 (90 BASE) MCG/ACT Aero Soln inhalation aerosol Inhale 2 Puffs by mouth every 6 hours as needed for Shortness of Breath.     • cyanocobalamin (HM VITAMIN B12) 500 MCG tablet Take 1,000 mcg by mouth 2 times a day.     • Zinc Oxide 11.3 % Cream Apply 1 Application to affected area(s) 1 time daily as needed (for skin irritations to coccyx).     • sodium bicarbonate 325 MG Tab Take 2 Tabs by mouth 3 times a day.     • levothyroxine (SYNTHROID) 75 MCG Tab Take 75 mcg by mouth Every morning on an empty stomach.     • Ivabradine HCl (CORLANOR) 5 MG Tab Take 1 Tab by mouth 2 Times a Day. 60 Tab 6   • omeprazole (PRILOSEC) 20 MG delayed-release capsule Take 20 mg by mouth 2 times a day.     • ranitidine (ZANTAC) 150 MG Tab Take 150 mg by mouth 2 times a day.       No current facility-administered medications for this visit.         Allergies as of 03/08/2017 - Joe as Reviewed 03/08/2017   Allergen Reaction Noted   • Cefdinir Shortness of Breath and Itching 03/01/2016   • Depakote [divalproex sodium] Unspecified 06/14/2010   • Abilify Unspecified 01/17/2013   • Amitriptyline Unspecified 10/31/2013   • Amoxicillin Rash 09/18/2010   • Ciprofloxacin Rash 12/17/2009   • Clindamycin Nausea 02/02/2011   • Doxycycline Vomiting and Nausea 08/15/2012   • Ees [erythromycin] Vomiting and Nausea 08/28/2010   • Flagyl [metronidazole hcl] Unspecified 03/31/2011   • Flomax [tamsulosin hydrochloride] Swelling 09/24/2009   • Metformin Unspecified 07/23/2013   • Sulfa drugs Hives and Rash 09/18/2010  "  • Tape Rash 08/15/2012   • Vancomycin Itching 07/10/2016   • Cephalexin [keflex] Rash 01/01/2017   • Levofloxacin Unspecified 10/27/2016        ROS: As per HPI.    /88 mmHg  Pulse 100  Temp(Src) 37.1 °C (98.7 °F)  Resp 16  Ht 1.575 m (5' 2\")  Wt 120.657 kg (266 lb)  BMI 48.64 kg/m2  SpO2 98%  LMP 07/16/2016    Physical Exam:  Gen:         Alert and oriented, No apparent distress.  Neck:        No Lymphadenopathy or Bruits.  Lungs:     Clear to auscultation bilaterally  CV:          Regular rate and rhythm. No murmurs, rubs or gallops.               Ext:          No clubbing, cyanosis, edema.      Assessment and Plan.   26 y.o. female with the following issues.    1. Weakness of both lower extremities  Patient improved since hospitalization but still not at baseline she has first for steroid-dependent infusion next week. We'll continue to follow along with neurology and urology.        "

## 2017-03-08 NOTE — MR AVS SNAPSHOT
"        Kristin Balderrama   3/8/2017 10:00 AM   Office Visit   MRN: [id number]    Department:  [address] [place] [address]   Dept Phone:  [phone]    Description:  Female : 1990   Provider:  Torres Brody M.D.           Reason for Visit     Hospital Follow-up Stroke like symptoms      Allergies as of 3/8/2017     Allergen Noted Reactions    Cefdinir 2016   Shortness of Breath, Itching    Depakote [Divalproex Sodium] 2010   Unspecified    Muscle spasms/muscle pain and weakness      Abilify 2013   Unspecified    Headaches/muscle twitching      Amitriptyline 10/31/2013   Unspecified    Headaches      Amoxicillin 2010   Rash    Pt states \"I get a rash\".      Ciprofloxacin 2009   Rash    Pt states \"I get a rash\".      Clindamycin 2011   Nausea    Even with food      Doxycycline 08/15/2012   Vomiting, Nausea    RXN=unknown    Ees [Erythromycin] 2010   Vomiting, Nausea    Flagyl [Metronidazole Hcl] 2011   Unspecified    \"eye problems\"      Flomax [Tamsulosin Hydrochloride] 2009   Swelling    Metformin 2013   Unspecified    Increased lactic acid       Sulfa Drugs 2010   Hives, Rash    RXN=since childhood    Tape 08/15/2012   Rash    Tears skin off   coban with Tegaderm tape ok  RXN=ongoing    Vancomycin 07/10/2016   Itching    Pt becomes flushed in face and chest.   RXN=7/10/16    Cephalexin [Keflex] 2017   Rash    Pt states she gets a rash with this medication    Levofloxacin 10/27/2016   Unspecified    Leg muscle cramps      You were diagnosed with     Weakness of both lower extremities   [[id number]]         Vital Signs     Blood Pressure Pulse Temperature Respirations Height Weight    138/88 mmHg 100 37.1 °C (98.7 °F) 16 1.575 m (5' 2\") 120.657 kg (266 lb)    Body Mass Index Oxygen Saturation Last Menstrual Period Smoking Status          48.64 kg/m2 98% 2016 Passive Smoke Exposure - Never Smoker        Basic Information     Date Of Birth " Sex Race Ethnicity Preferred Language    1990 Female White Non- English      Your appointments     Mar 09, 2017 To Be Determined   MSW-HH-HOME VISIT with WINDY Corcoran   [facility] (--)    [address]  [place] [address]   [phone]            Mar 09, 2017  9:00 AM   PT-HH-HOME VISIT with Abhishek Baker P.T.   [facility] (--)    [address]  [place] [address]   [phone]            Mar 10, 2017 To Be Determined   SN-HH-HOME VISIT with Jonelle Santacruz R.N.   [facility] (--)    [address]  [place] [address]   [phone]            Mar 13, 2017  9:00 AM   PT-HH-HOME VISIT with Abhishek Baker P.T.   [facility] (--)    [address]  [place] [address]   [phone]            Mar 14, 2017 To Be Determined   SN-HH-HOME VISIT with Shelbi Ruby R.N.   [facility] (--)    [address]  [place] [address]   [phone]            Mar 14, 2017  3:00 PM   NEW MISC Infusion 2 HR with RN 1   [facility] ([facility])    [address] [facility] [address]  [place] [address]   [phone]            Mar 15, 2017  9:00 AM   Individual Therapy with Blanca Brantley L.C.S.W.   [facility]    [address]  [address]  [place] [address] [phone]            Mar 16, 2017  9:00 AM   PT-HH-HOME VISIT with Abhishek Baker P.T.   [facility] (--)    [address]  [place] [address]   [phone]            Mar 17, 2017  8:30 AM   Follow Up Med Management with Ronny Burnette M.D.   [facility] [address] [place] [address])    [address] [facility] [address]  [address]  [place] [address]   [phone]            Mar 17, 2017 To Be Determined   SN-HH-HOME VISIT with Shelbi Ruby R.N.   [facility] (--)    [address]  [place] [place] [address]   [phone]            Mar 21, 2017  9:00 AM   PT-HH-HOME VISIT with Abhishek Baker P.T.   [facility] (--)    [address]  [place] [address]      [phone]            Mar 21, 2017 To Be Determined   SN-HH-HOME VISIT with Shelbi Ruby R.N.   [facility] (--)    [address]  [place] [address]   [phone]            Mar 23, 2017  9:00 AM   SU-BW-BRVBNNVMYG DISCHARGE with Abhishek Baker P.T.   [facility] (--)    [address]  [place] [address]   [phone]            Mar 28, 2017 To Be Determined   SN-HH-HOME VISIT with Shelbi Ruby R.N.   [facility] (--)    [address]  [place] [address]   [phone]            Mar 29, 2017  9:00 AM   Individual Therapy with Blanca Brantley L.C.S.W.   [facility] ([facility])    [address]  [address]  [place] [address] [phone]            Apr 04, 2017 To Be Determined   SN-HH-HOME VISIT with Shelbi Ruby R.N.   [facility] (--)    [address]  [place] [address]   [phone]            Apr 11, 2017 To Be Determined   SN-HH-HOME VISIT with Shelbi Ruby R.N.   [facility] (--)    [address]  [place] [address]   [phone]            Apr 12, 2017  9:00 AM   Individual Therapy with Blanca Brantley L.C.S.W.   [facility] ([facility]    [address]  [address]  [place] [address] [phone]            Apr 20, 2017 To Be Determined   SN-HH-OASIS RECERTIFICATION with Shelbi Ruby R.N.   [facility] (--)    [address]  [place] [address]   [phone]            Apr 27, 2017 To Be Determined   SN-HH-HOME VISIT with Shelbi Ruby R.N.   [facility] (--)    [address]  [place] [address]   [phone]            Apr 28, 2017 11:40 AM   New Patient with C Rotation   [facility] (--)    [address]  [place] [address]   [phone]           Please bring enclosed paperwork completed along with your insurance card and photo ID.            May 03, 2017  3:40 PM   Follow Up Visit with Sandoval Fox M.D.   [facility] (--)     [address], [address]  [place] [address] [phone]           You will be receiving a confirmation call a few days before your appointment from our automated call confirmation system.            Jul 19, 2017  8:40 AM   FOLLOW UP with Torres Kumar M.D.   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890   2/10/2016 - Present    Peripheral neuropathy (CMS-HCC) (Chronic) G62.9   3/6/2016 - Present    TONYA on CPAP G47.33 Low  3/7/2016 - Present    Morbidly obese (CMS-HCC) E66.01   3/7/2016 - Present    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   3/7/2016 - Present    Scoliosis M41.9   3/7/2016 - Present    GERD (gastroesophageal reflux disease) (Chronic) K21.9   3/7/2016 - Present    Vitamin D deficiency E55.9   5/21/2016 - Present    Chronic inflammatory arthritis (Chronic) M19.90 Medium  5/23/2016 - Present    Right flank pain R10.9   6/6/2016 - Present    Weakness of both lower extremities M62.81   6/22/2016 - Present    Elevated sedimentation rate R70.0   6/27/2016 - Present    Galactorrhea O92.6   7/22/2016 - Present    Psychosis, schizophrenia, simple (HCC) (Chronic) F20.89 Low  "Chronic 9/29/2016 - Present    Schizophrenia (CMS-HCC) F20.9 Low  10/27/2016 - Present    Paralysis (CMS-HCC) G83.9 High  10/27/2016 - Present    Chronic pain syndrome (Chronic) G89.4 Medium  10/27/2016 - Present    Suprapubic catheter (CMS-HCC) (Chronic) Z93.59 Low  10/27/2016 - Present    Bowel and bladder incontinence R32, R15.9   10/27/2016 - Present    Depression F32.9 Low  10/28/2016 - Present    HTN (hypertension) (Chronic) I10 Medium  11/1/2016 - Present    Quadriparesis (CMS-HCC) G82.50 High  11/1/2016 - Present    Hypovitaminosis D E55.9   11/29/2016 - Present    Leg weakness M62.81   1/4/2017 - Present    Weakness R53.1   1/22/2017 - Present    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 High  1/24/2017 - Present    Chronic suprapubic catheter (CMS-HCC) Z93.59   2/16/2017 - Present    Leg weakness, bilateral M62.81   2/22/2017 - Present    Weakness of right upper extremity M62.81   2/23/2017 - Present      Health Maintenance        Date Due Completion Dates    IMM HEP A VACCINE (1 of 2 - Standard Series) 10/13/1990 ---    IMM VARICELLA (CHICKENPOX) VACCINE (1 of 2 - 2 Dose Adolescent Series) 10/13/2002 ---    PAP SMEAR 7/22/2019 7/22/2016 (Postponed), 2/19/2013 (N/S)    Override on 7/22/2016: Postponed (per pt was told could \"skip\" 2016)    Override on 2/19/2013: (N/S) ([facility])    IMM DTaP/Tdap/Td Vaccine (7 - Td) 8/6/2022 8/6/2012, 9/18/2010, 3/3/1994, 5/17/1991, 5/10/1990, 3/23/1990, 1990    COLONOSCOPY 9/25/2023 9/25/2013            Current Immunizations     Dtap Vaccine 3/3/1994, 5/17/1991, 5/10/1990, 3/23/1990, 1990    HIB Vaccine(PEDVAX) 1/11/1991    HPV Quadrivalent Vaccine (GARDASIL) 10/27/2011, 5/27/2011, 3/1/2011    Hepatitis B Vaccine Non-Recombivax (Ped/Adol) 1/28/2004, 10/28/2003, 10/29/1999    Influenza TIV (IM) 9/5/2013, 12/7/2011, 11/7/2011    Influenza Vaccine Adult HD 10/5/2016    MMR Vaccine 3/3/1994, 1/11/1991    OPV - Historical Data 3/3/1994, 5/17/1991, 5/10/1990, " 3/23/1990, 1989    Pneumococcal Vaccine (PCV7) Historical Data 11/8/2015    Pneumococcal polysaccharide vaccine (PPSV-23) 1/23/2017  5:35 PM    TD Vaccine 9/18/2010  4:45 PM    Tdap Vaccine 8/6/2012      Below and/or attached are the medications your provider expects you to take. Review all of your home medications and newly ordered medications with your provider and/or pharmacist. Follow medication instructions as directed by your provider and/or pharmacist. Please keep your medication list with you and share with your provider. Update the information when medications are discontinued, doses are changed, or new medications (including over-the-counter products) are added; and carry medication information at all times in the event of emergency situations     Allergies:  CEFDINIR - Shortness of Breath,Itching     DEPAKOTE - Unspecified     ABILIFY - Unspecified     AMITRIPTYLINE - Unspecified     AMOXICILLIN - Rash     CIPROFLOXACIN - Rash     CLINDAMYCIN - Nausea     DOXYCYCLINE - Vomiting,Nausea     EES - Vomiting,Nausea     FLAGYL - Unspecified     FLOMAX - Swelling     METFORMIN - Unspecified     SULFA DRUGS - Hives,Rash     TAPE - Rash     VANCOMYCIN - Itching     CEPHALEXIN - Rash     LEVOFLOXACIN - Unspecified               Medications  Valid as of: March 08, 2017 - 10:36 AM    Generic Name Brand Name Tablet Size Instructions for use    Albuterol Sulfate (Aero Soln) albuterol 108 (90 BASE) MCG/ACT Inhale 2 Puffs by mouth every 6 hours as needed for Shortness of Breath.        Aspirin (Tablet Delayed Response) ECOTRIN 81 MG Take 1 Tab by mouth every day.        Atorvastatin Calcium (Tab) LIPITOR 40 MG Take 1 Tab by mouth every bedtime.        Cyanocobalamin (Tab) vitamin b12 500 MCG Take 1,000 mcg by mouth 2 times a day.        Cyclobenzaprine HCl (Tab) FLEXERIL 10 MG Take 10 mg by mouth 2 Times a Day. Indications: Muscle Spasm        Ergocalciferol (Cap) DRISDOL 78011 UNITS Take 50,000 Units by mouth  every Friday.        FentaNYL (PATCH 72 HR) DURAGESIC 25 MCG/HR Apply 1 Patch to skin as directed every 72 hours.        FLUoxetine HCl (Cap) PROZAC 10 MG Take 1 Cap by mouth every day.        Furosemide (Tab) LASIX 20 MG Take 20 mg by mouth See Admin Instructions. Is taking 20 mg BID X2 days ONLY (on 2/21 and 2/22/17) then 1 tablet a day thereafter        Gabapentin (Once-Daily) (Tab) Gabapentin (Once-Daily) 600 MG Take 600 mg by mouth 3 times a day.        Ivabradine HCl (Tab) CORLANOR 5 MG Take 1 Tab by mouth 2 Times a Day.        Ivabradine HCl (Tab) CORLANOR 5 MG Take 5 mg by mouth 2 times a day, with meals.        Lactulose (Solution) lactulose 10 GM/15ML Take 10 g by mouth 2 times a day.        Levothyroxine Sodium (Tab) SYNTHROID 75 MCG Take 75 mcg by mouth Every morning on an empty stomach.        Melatonin (Tab) Melatonin 5 MG Take 5 mg by mouth 1 time daily as needed (for sleep).        Miconazole Nitrate (Cream) MICOTIN 2 % Apply 1 Application to affected area(s) 2 times a day.        Omeprazole (CAPSULE DELAYED RELEASE) PRILOSEC 20 MG Take 20 mg by mouth 2 times a day.        Oxycodone-Acetaminophen (Tab) PERCOCET-10  MG Take 2 Tabs by mouth every 6 hours. Two tabs  Indications: Pain        PredniSONE (Tab) DELTASONE 10 MG Take 50 mg for one day, Then 40 mg for one day, Then 30 mg for one day, Then 20 mg for one day, Then 10 mg for one day and stop        Promethazine HCl (Tab) PHENERGAN 25 MG Take 1 Tab by mouth every 6 hours as needed for Nausea/Vomiting.        RaNITidine HCl (Tab) ZANTAC 150 MG Take 150 mg by mouth 2 times a day.        Sodium Bicarbonate (Tab) sodium bicarbonate 325 MG Take 2 Tabs by mouth 3 times a day.        Zinc Oxide (Cream) Zinc Oxide 11.3 % Apply 1 Application to affected area(s) 1 time daily as needed (for skin irritations to coccyx).        Ziprasidone HCl (Cap) GEODON 80 MG Take 160 mg by mouth every evening.        .                 Medicines prescribed today were  sent to:     [id number] - [place], [place] - [address] [place] [address]    Phone: [phone] Fax: [phone]    Open 24 Hours?: No      Medication refill instructions:       If your prescription bottle indicates you have medication refills left, it is not necessary to call your provider’s office. Please contact your pharmacy and they will refill your medication.    If your prescription bottle indicates you do not have any refills left, you may request refills at any time through one of the following ways: The online [employer] system (except Urgent Care), by calling your provider’s office, or by asking your pharmacy to contact your provider’s office with a refill request. Medication refills are processed only during regular business hours and may not be available until the next business day. Your provider may request additional information or to have a follow-up visit with you prior to refilling your medication.   *Please Note: Medication refills are assigned a new Rx number when refilled electronically. Your pharmacy may indicate that no refills were authorized even though a new prescription for the same medication is available at the pharmacy. Please request the medicine by name with the pharmacy before contacting your provider for a refill.        Other Notes About Your Plan     DME:  [facility] / ph [phone] / fax [phone]              MyChart Access Code: Activation code not generated  Current [employer] Status: Active

## 2017-03-08 NOTE — TELEPHONE ENCOUNTER
----- Message from Deniz Vaca sent at 3/7/2017  4:18 PM PST -----  Regarding: Pt calling for test results  FUENTES/[place],    Patient is calling for results of Echo done 3/1. She can be reached at [phone] for call back.

## 2017-03-09 ENCOUNTER — APPOINTMENT (OUTPATIENT)
Dept: RADIOLOGY | Facility: MEDICAL CENTER | Age: 27
DRG: 698 | End: 2017-03-09
Attending: EMERGENCY MEDICINE
Payer: MEDICARE

## 2017-03-09 ENCOUNTER — HOME CARE VISIT (OUTPATIENT)
Dept: HOME HEALTH SERVICES | Facility: HOME HEALTHCARE | Age: 27
End: 2017-03-09
Payer: MEDICARE

## 2017-03-09 ENCOUNTER — RESOLUTE PROFESSIONAL BILLING HOSPITAL PROF FEE (OUTPATIENT)
Dept: HOSPITALIST | Facility: MEDICAL CENTER | Age: 27
End: 2017-03-09
Payer: MEDICARE

## 2017-03-09 ENCOUNTER — OFFICE VISIT (OUTPATIENT)
Dept: URGENT CARE | Facility: CLINIC | Age: 27
End: 2017-03-09
Payer: MEDICARE

## 2017-03-09 ENCOUNTER — APPOINTMENT (OUTPATIENT)
Dept: PHYSICAL THERAPY | Facility: REHABILITATION | Age: 27
End: 2017-03-09
Attending: INTERNAL MEDICINE
Payer: MEDICARE

## 2017-03-09 ENCOUNTER — APPOINTMENT (OUTPATIENT)
Dept: RADIOLOGY | Facility: MEDICAL CENTER | Age: 27
DRG: 698 | End: 2017-03-09
Payer: MEDICARE

## 2017-03-09 ENCOUNTER — HOSPITAL ENCOUNTER (INPATIENT)
Facility: MEDICAL CENTER | Age: 27
LOS: 2 days | DRG: 698 | End: 2017-03-11
Attending: EMERGENCY MEDICINE | Admitting: INTERNAL MEDICINE
Payer: MEDICARE

## 2017-03-09 VITALS
TEMPERATURE: 97 F | RESPIRATION RATE: 18 BRPM | SYSTOLIC BLOOD PRESSURE: 136 MMHG | DIASTOLIC BLOOD PRESSURE: 96 MMHG | HEART RATE: 105 BPM

## 2017-03-09 VITALS
SYSTOLIC BLOOD PRESSURE: 128 MMHG | RESPIRATION RATE: 22 BRPM | OXYGEN SATURATION: 94 % | TEMPERATURE: 97.8 F | HEART RATE: 118 BPM | DIASTOLIC BLOOD PRESSURE: 86 MMHG

## 2017-03-09 VITALS
RESPIRATION RATE: 18 BRPM | TEMPERATURE: 96.1 F | HEART RATE: 100 BPM | SYSTOLIC BLOOD PRESSURE: 146 MMHG | DIASTOLIC BLOOD PRESSURE: 100 MMHG

## 2017-03-09 DIAGNOSIS — R53.1 PROGRESSIVE FOCAL MOTOR WEAKNESS: ICD-10-CM

## 2017-03-09 DIAGNOSIS — F44.81 MULTIPLE PERSONALITY DISORDER (HCC): ICD-10-CM

## 2017-03-09 DIAGNOSIS — G89.29 CHRONIC LOW BACK PAIN, UNSPECIFIED BACK PAIN LATERALITY, WITH SCIATICA PRESENCE UNSPECIFIED: ICD-10-CM

## 2017-03-09 DIAGNOSIS — K76.0 FATTY LIVER DISEASE, NONALCOHOLIC: ICD-10-CM

## 2017-03-09 DIAGNOSIS — E66.01 MORBID OBESITY DUE TO EXCESS CALORIES (HCC): ICD-10-CM

## 2017-03-09 DIAGNOSIS — T83.511D URINARY TRACT INFECTION ASSOCIATED WITH INDWELLING URETHRAL CATHETER, SUBSEQUENT ENCOUNTER: ICD-10-CM

## 2017-03-09 DIAGNOSIS — N39.0 URINARY TRACT INFECTION ASSOCIATED WITH INDWELLING URETHRAL CATHETER, SUBSEQUENT ENCOUNTER: ICD-10-CM

## 2017-03-09 DIAGNOSIS — N30.00 ACUTE CYSTITIS WITHOUT HEMATURIA: ICD-10-CM

## 2017-03-09 DIAGNOSIS — R41.0 CONFUSION: ICD-10-CM

## 2017-03-09 DIAGNOSIS — N31.9 NEUROGENIC BLADDER: ICD-10-CM

## 2017-03-09 DIAGNOSIS — R00.0 TACHYCARDIA: ICD-10-CM

## 2017-03-09 DIAGNOSIS — M54.5 CHRONIC LOW BACK PAIN, UNSPECIFIED BACK PAIN LATERALITY, WITH SCIATICA PRESENCE UNSPECIFIED: ICD-10-CM

## 2017-03-09 DIAGNOSIS — G47.33 OSA ON CPAP: ICD-10-CM

## 2017-03-09 PROBLEM — A41.9 SEPSIS (HCC): Status: ACTIVE | Noted: 2017-03-09

## 2017-03-09 LAB
ALBUMIN SERPL BCP-MCNC: 4.9 G/DL (ref 3.2–4.9)
ALBUMIN/GLOB SERPL: 1.9 G/DL
ALP SERPL-CCNC: 76 U/L (ref 30–99)
ALT SERPL-CCNC: 125 U/L (ref 2–50)
ANION GAP SERPL CALC-SCNC: 13 MMOL/L (ref 0–11.9)
APPEARANCE UR: ABNORMAL
APPEARANCE UR: ABNORMAL
APTT PPP: 26 SEC (ref 24.7–36)
AST SERPL-CCNC: 58 U/L (ref 12–45)
BACTERIA #/AREA URNS HPF: ABNORMAL /HPF
BASOPHILS # BLD AUTO: 0.5 % (ref 0–1.8)
BASOPHILS # BLD: 0.07 K/UL (ref 0–0.12)
BILIRUB SERPL-MCNC: 0.9 MG/DL (ref 0.1–1.5)
BILIRUB UR QL STRIP.AUTO: NEGATIVE
BILIRUB UR STRIP-MCNC: NEGATIVE MG/DL
BUN SERPL-MCNC: 10 MG/DL (ref 8–22)
CALCIUM SERPL-MCNC: 10.4 MG/DL (ref 8.5–10.5)
CHLORIDE SERPL-SCNC: 105 MMOL/L (ref 96–112)
CO2 SERPL-SCNC: 20 MMOL/L (ref 20–33)
COLOR UR AUTO: ABNORMAL
COLOR UR: YELLOW
CREAT SERPL-MCNC: 0.57 MG/DL (ref 0.5–1.4)
CULTURE IF INDICATED INDCX: YES UA CULTURE
EOSINOPHIL # BLD AUTO: 0.18 K/UL (ref 0–0.51)
EOSINOPHIL NFR BLD: 1.3 % (ref 0–6.9)
ERYTHROCYTE [DISTWIDTH] IN BLOOD BY AUTOMATED COUNT: 42.6 FL (ref 35.9–50)
GFR SERPL CREATININE-BSD FRML MDRD: >60 ML/MIN/1.73 M 2
GLOBULIN SER CALC-MCNC: 2.6 G/DL (ref 1.9–3.5)
GLUCOSE SERPL-MCNC: 145 MG/DL (ref 65–99)
GLUCOSE UR STRIP.AUTO-MCNC: NEGATIVE MG/DL
GLUCOSE UR STRIP.AUTO-MCNC: NEGATIVE MG/DL
HCT VFR BLD AUTO: 41.9 % (ref 37–47)
HGB BLD-MCNC: 14.2 G/DL (ref 12–16)
IMM GRANULOCYTES # BLD AUTO: 0.13 K/UL (ref 0–0.11)
IMM GRANULOCYTES NFR BLD AUTO: 0.9 % (ref 0–0.9)
INR PPP: 0.92 (ref 0.87–1.13)
INT CON NEG: NEGATIVE
INT CON POS: POSITIVE
KETONES UR STRIP.AUTO-MCNC: NEGATIVE MG/DL
KETONES UR STRIP.AUTO-MCNC: NEGATIVE MG/DL
LACTATE BLD-SCNC: 2.1 MMOL/L (ref 0.5–2)
LACTATE BLD-SCNC: 3.1 MMOL/L (ref 0.5–2)
LEUKOCYTE ESTERASE UR QL STRIP.AUTO: ABNORMAL
LEUKOCYTE ESTERASE UR QL STRIP.AUTO: ABNORMAL
LYMPHOCYTES # BLD AUTO: 3.16 K/UL (ref 1–4.8)
LYMPHOCYTES NFR BLD: 22.7 % (ref 22–41)
MCH RBC QN AUTO: 30.3 PG (ref 27–33)
MCHC RBC AUTO-ENTMCNC: 33.9 G/DL (ref 33.6–35)
MCV RBC AUTO: 89.3 FL (ref 81.4–97.8)
MICRO URNS: ABNORMAL
MONOCYTES # BLD AUTO: 0.88 K/UL (ref 0–0.85)
MONOCYTES NFR BLD AUTO: 6.3 % (ref 0–13.4)
MUCOUS THREADS #/AREA URNS HPF: ABNORMAL /HPF
NEUTROPHILS # BLD AUTO: 9.51 K/UL (ref 2–7.15)
NEUTROPHILS NFR BLD: 68.3 % (ref 44–72)
NITRITE UR QL STRIP.AUTO: POSITIVE
NITRITE UR QL STRIP.AUTO: POSITIVE
NRBC # BLD AUTO: 0 K/UL
NRBC BLD AUTO-RTO: 0 /100 WBC
PH UR STRIP.AUTO: 8.5 [PH]
PH UR STRIP.AUTO: 8.5 [PH] (ref 5–8)
PLATELET # BLD AUTO: 172 K/UL (ref 164–446)
PMV BLD AUTO: 11.1 FL (ref 9–12.9)
POC URINE PREGNANCY TEST: NEGATIVE
POTASSIUM SERPL-SCNC: 3.9 MMOL/L (ref 3.6–5.5)
PROT SERPL-MCNC: 7.5 G/DL (ref 6–8.2)
PROT UR QL STRIP: 100 MG/DL
PROT UR QL STRIP: 2000 MG/DL
PROTHROMBIN TIME: 12.6 SEC (ref 12–14.6)
RBC # BLD AUTO: 4.69 M/UL (ref 4.2–5.4)
RBC # URNS HPF: ABNORMAL /HPF
RBC UR QL AUTO: ABNORMAL
RBC UR QL AUTO: NEGATIVE
SODIUM SERPL-SCNC: 138 MMOL/L (ref 135–145)
SP GR UR STRIP.AUTO: 1.01
SP GR UR STRIP.AUTO: 1.02
TRI-PHOS CRY #/AREA URNS HPF: ABNORMAL /HPF
TSH SERPL DL<=0.005 MIU/L-ACNC: 2.69 UIU/ML (ref 0.3–3.7)
UROBILINOGEN UR STRIP-MCNC: 1 MG/DL
WBC # BLD AUTO: 13.9 K/UL (ref 4.8–10.8)
WBC #/AREA URNS HPF: ABNORMAL /HPF

## 2017-03-09 PROCEDURE — 96366 THER/PROPH/DIAG IV INF ADDON: CPT

## 2017-03-09 PROCEDURE — G0496 LPN CARE TRAIN/EDU IN HH: HCPCS

## 2017-03-09 PROCEDURE — 84443 ASSAY THYROID STIM HORMONE: CPT

## 2017-03-09 PROCEDURE — 665999 HH PPS REVENUE DEBIT

## 2017-03-09 PROCEDURE — 80053 COMPREHEN METABOLIC PANEL: CPT

## 2017-03-09 PROCEDURE — 94760 N-INVAS EAR/PLS OXIMETRY 1: CPT

## 2017-03-09 PROCEDURE — 700105 HCHG RX REV CODE 258: Performed by: EMERGENCY MEDICINE

## 2017-03-09 PROCEDURE — 83605 ASSAY OF LACTIC ACID: CPT | Mod: 91

## 2017-03-09 PROCEDURE — 770020 HCHG ROOM/CARE - TELE (206)

## 2017-03-09 PROCEDURE — 96365 THER/PROPH/DIAG IV INF INIT: CPT

## 2017-03-09 PROCEDURE — 81002 URINALYSIS NONAUTO W/O SCOPE: CPT | Performed by: NURSE PRACTITIONER

## 2017-03-09 PROCEDURE — 87186 SC STD MICRODIL/AGAR DIL: CPT

## 2017-03-09 PROCEDURE — 96361 HYDRATE IV INFUSION ADD-ON: CPT

## 2017-03-09 PROCEDURE — 71010 DX-CHEST-PORTABLE (1 VIEW): CPT

## 2017-03-09 PROCEDURE — 700111 HCHG RX REV CODE 636 W/ 250 OVERRIDE (IP): Performed by: EMERGENCY MEDICINE

## 2017-03-09 PROCEDURE — 700105 HCHG RX REV CODE 258: Performed by: INTERNAL MEDICINE

## 2017-03-09 PROCEDURE — 87077 CULTURE AEROBIC IDENTIFY: CPT

## 2017-03-09 PROCEDURE — 85025 COMPLETE CBC W/AUTO DIFF WBC: CPT

## 2017-03-09 PROCEDURE — G8419 CALC BMI OUT NRM PARAM NOF/U: HCPCS | Performed by: NURSE PRACTITIONER

## 2017-03-09 PROCEDURE — 700111 HCHG RX REV CODE 636 W/ 250 OVERRIDE (IP): Performed by: INTERNAL MEDICINE

## 2017-03-09 PROCEDURE — 99285 EMERGENCY DEPT VISIT HI MDM: CPT

## 2017-03-09 PROCEDURE — G0151 HHCP-SERV OF PT,EA 15 MIN: HCPCS

## 2017-03-09 PROCEDURE — G8432 DEP SCR NOT DOC, RNG: HCPCS | Performed by: NURSE PRACTITIONER

## 2017-03-09 PROCEDURE — 1111F DSCHRG MED/CURRENT MED MERGE: CPT | Performed by: NURSE PRACTITIONER

## 2017-03-09 PROCEDURE — 302128 INFUSION PUMP: Performed by: EMERGENCY MEDICINE

## 2017-03-09 PROCEDURE — 87040 BLOOD CULTURE FOR BACTERIA: CPT

## 2017-03-09 PROCEDURE — 96367 TX/PROPH/DG ADDL SEQ IV INF: CPT

## 2017-03-09 PROCEDURE — 85610 PROTHROMBIN TIME: CPT

## 2017-03-09 PROCEDURE — 85730 THROMBOPLASTIN TIME PARTIAL: CPT

## 2017-03-09 PROCEDURE — 1036F TOBACCO NON-USER: CPT | Performed by: NURSE PRACTITIONER

## 2017-03-09 PROCEDURE — 81025 URINE PREGNANCY TEST: CPT | Performed by: NURSE PRACTITIONER

## 2017-03-09 PROCEDURE — G8482 FLU IMMUNIZE ORDER/ADMIN: HCPCS | Performed by: NURSE PRACTITIONER

## 2017-03-09 PROCEDURE — 87086 URINE CULTURE/COLONY COUNT: CPT

## 2017-03-09 PROCEDURE — 665998 HH PPS REVENUE CREDIT

## 2017-03-09 PROCEDURE — 99213 OFFICE O/P EST LOW 20 MIN: CPT | Performed by: NURSE PRACTITIONER

## 2017-03-09 PROCEDURE — 81001 URINALYSIS AUTO W/SCOPE: CPT

## 2017-03-09 PROCEDURE — A9270 NON-COVERED ITEM OR SERVICE: HCPCS | Performed by: INTERNAL MEDICINE

## 2017-03-09 PROCEDURE — 700102 HCHG RX REV CODE 250 W/ 637 OVERRIDE(OP): Performed by: INTERNAL MEDICINE

## 2017-03-09 PROCEDURE — 99223 1ST HOSP IP/OBS HIGH 75: CPT | Performed by: INTERNAL MEDICINE

## 2017-03-09 RX ORDER — HEPARIN SODIUM 5000 [USP'U]/ML
5000 INJECTION, SOLUTION INTRAVENOUS; SUBCUTANEOUS EVERY 8 HOURS
Status: DISCONTINUED | OUTPATIENT
Start: 2017-03-09 | End: 2017-03-11 | Stop reason: HOSPADM

## 2017-03-09 RX ORDER — ALBUTEROL SULFATE 90 UG/1
2 AEROSOL, METERED RESPIRATORY (INHALATION) EVERY 6 HOURS PRN
Status: DISCONTINUED | OUTPATIENT
Start: 2017-03-09 | End: 2017-03-11 | Stop reason: HOSPADM

## 2017-03-09 RX ORDER — PROMETHAZINE HYDROCHLORIDE 25 MG/1
25 TABLET ORAL EVERY 6 HOURS PRN
Status: DISCONTINUED | OUTPATIENT
Start: 2017-03-09 | End: 2017-03-11 | Stop reason: HOSPADM

## 2017-03-09 RX ORDER — GABAPENTIN 600 MG/1
600-1200 TABLET ORAL 2 TIMES DAILY
COMMUNITY
End: 2017-04-12

## 2017-03-09 RX ORDER — SODIUM BICARBONATE 650 MG/1
650 TABLET ORAL 3 TIMES DAILY
Status: DISCONTINUED | OUTPATIENT
Start: 2017-03-09 | End: 2017-03-11 | Stop reason: HOSPADM

## 2017-03-09 RX ORDER — NITROFURANTOIN 25; 75 MG/1; MG/1
100 CAPSULE ORAL 2 TIMES DAILY
Status: ON HOLD | COMMUNITY
Start: 2017-02-27 | End: 2017-03-11

## 2017-03-09 RX ORDER — ERGOCALCIFEROL 1.25 MG/1
50000 CAPSULE ORAL
Status: DISCONTINUED | OUTPATIENT
Start: 2017-03-10 | End: 2017-03-11 | Stop reason: HOSPADM

## 2017-03-09 RX ORDER — LEVOTHYROXINE SODIUM 0.07 MG/1
75 TABLET ORAL
Status: DISCONTINUED | OUTPATIENT
Start: 2017-03-10 | End: 2017-03-11 | Stop reason: HOSPADM

## 2017-03-09 RX ORDER — FENTANYL 25 UG/1
1 PATCH TRANSDERMAL
Status: DISCONTINUED | OUTPATIENT
Start: 2017-03-10 | End: 2017-03-10

## 2017-03-09 RX ORDER — SODIUM CHLORIDE 9 MG/ML
INJECTION, SOLUTION INTRAVENOUS CONTINUOUS
Status: DISCONTINUED | OUTPATIENT
Start: 2017-03-09 | End: 2017-03-11

## 2017-03-09 RX ORDER — ATORVASTATIN CALCIUM 40 MG/1
40 TABLET, FILM COATED ORAL
Status: DISCONTINUED | OUTPATIENT
Start: 2017-03-09 | End: 2017-03-11 | Stop reason: HOSPADM

## 2017-03-09 RX ORDER — GABAPENTIN 300 MG/1
600 CAPSULE ORAL DAILY
Status: DISCONTINUED | OUTPATIENT
Start: 2017-03-10 | End: 2017-03-11 | Stop reason: HOSPADM

## 2017-03-09 RX ORDER — CEFTRIAXONE 2 G/1
2 INJECTION, POWDER, FOR SOLUTION INTRAMUSCULAR; INTRAVENOUS ONCE
Status: COMPLETED | OUTPATIENT
Start: 2017-03-09 | End: 2017-03-09

## 2017-03-09 RX ORDER — SODIUM CHLORIDE 9 MG/ML
1000 INJECTION, SOLUTION INTRAVENOUS
Status: COMPLETED | OUTPATIENT
Start: 2017-03-09 | End: 2017-03-09

## 2017-03-09 RX ORDER — BISACODYL 10 MG
10 SUPPOSITORY, RECTAL RECTAL
Status: DISCONTINUED | OUTPATIENT
Start: 2017-03-09 | End: 2017-03-11 | Stop reason: HOSPADM

## 2017-03-09 RX ORDER — RANITIDINE 150 MG/1
150 TABLET ORAL 2 TIMES DAILY
Status: DISCONTINUED | OUTPATIENT
Start: 2017-03-09 | End: 2017-03-09

## 2017-03-09 RX ORDER — CHOLECALCIFEROL (VITAMIN D3) 125 MCG
1000 CAPSULE ORAL 2 TIMES DAILY
Status: DISCONTINUED | OUTPATIENT
Start: 2017-03-09 | End: 2017-03-11 | Stop reason: HOSPADM

## 2017-03-09 RX ORDER — GABAPENTIN 400 MG/1
1200 CAPSULE ORAL EVERY EVENING
Status: DISCONTINUED | OUTPATIENT
Start: 2017-03-09 | End: 2017-03-11 | Stop reason: HOSPADM

## 2017-03-09 RX ORDER — POLYETHYLENE GLYCOL 3350 17 G/17G
1 POWDER, FOR SOLUTION ORAL
Status: DISCONTINUED | OUTPATIENT
Start: 2017-03-09 | End: 2017-03-11 | Stop reason: HOSPADM

## 2017-03-09 RX ORDER — FLUOXETINE 10 MG/1
10 CAPSULE ORAL DAILY
Status: DISCONTINUED | OUTPATIENT
Start: 2017-03-10 | End: 2017-03-11 | Stop reason: HOSPADM

## 2017-03-09 RX ORDER — SODIUM CHLORIDE 9 MG/ML
30 INJECTION, SOLUTION INTRAVENOUS ONCE
Status: COMPLETED | OUTPATIENT
Start: 2017-03-09 | End: 2017-03-09

## 2017-03-09 RX ORDER — ZIPRASIDONE HYDROCHLORIDE 80 MG/1
160 CAPSULE ORAL EVERY EVENING
Status: DISCONTINUED | OUTPATIENT
Start: 2017-03-09 | End: 2017-03-11 | Stop reason: HOSPADM

## 2017-03-09 RX ORDER — OXYCODONE AND ACETAMINOPHEN 10; 325 MG/1; MG/1
2 TABLET ORAL EVERY 6 HOURS PRN
Status: DISCONTINUED | OUTPATIENT
Start: 2017-03-09 | End: 2017-03-11 | Stop reason: HOSPADM

## 2017-03-09 RX ORDER — GABAPENTIN 600 MG/1
600-1200 TABLET ORAL 2 TIMES DAILY
Status: DISCONTINUED | OUTPATIENT
Start: 2017-03-09 | End: 2017-03-09

## 2017-03-09 RX ORDER — FAMOTIDINE 20 MG/1
20 TABLET, FILM COATED ORAL 2 TIMES DAILY
Status: DISCONTINUED | OUTPATIENT
Start: 2017-03-09 | End: 2017-03-09

## 2017-03-09 RX ORDER — AMOXICILLIN 250 MG
2 CAPSULE ORAL 2 TIMES DAILY PRN
Status: DISCONTINUED | OUTPATIENT
Start: 2017-03-09 | End: 2017-03-11 | Stop reason: HOSPADM

## 2017-03-09 RX ORDER — OMEPRAZOLE 20 MG/1
20 CAPSULE, DELAYED RELEASE ORAL 2 TIMES DAILY
Status: DISCONTINUED | OUTPATIENT
Start: 2017-03-09 | End: 2017-03-11 | Stop reason: HOSPADM

## 2017-03-09 RX ADMIN — SODIUM CHLORIDE 3537 ML: 9 INJECTION, SOLUTION INTRAVENOUS at 16:29

## 2017-03-09 RX ADMIN — SODIUM BICARBONATE TAB 650 MG 650 MG: 650 TAB at 22:27

## 2017-03-09 RX ADMIN — OMEPRAZOLE 20 MG: 20 CAPSULE, DELAYED RELEASE ORAL at 22:23

## 2017-03-09 RX ADMIN — SODIUM CHLORIDE: 9 INJECTION, SOLUTION INTRAVENOUS at 23:12

## 2017-03-09 RX ADMIN — CYANOCOBALAMIN TAB 500 MCG 1000 MCG: 500 TAB at 22:22

## 2017-03-09 RX ADMIN — GABAPENTIN 1200 MG: 400 CAPSULE ORAL at 22:23

## 2017-03-09 RX ADMIN — ATORVASTATIN CALCIUM 40 MG: 40 TABLET, FILM COATED ORAL at 22:37

## 2017-03-09 RX ADMIN — CEFTRIAXONE SODIUM 2 G: 2 INJECTION, POWDER, FOR SOLUTION INTRAMUSCULAR; INTRAVENOUS at 16:37

## 2017-03-09 RX ADMIN — HEPARIN SODIUM 5000 UNITS: 5000 INJECTION, SOLUTION INTRAVENOUS; SUBCUTANEOUS at 22:24

## 2017-03-09 RX ADMIN — SODIUM CHLORIDE 1000 ML: 9 INJECTION, SOLUTION INTRAVENOUS at 22:09

## 2017-03-09 RX ADMIN — VANCOMYCIN HYDROCHLORIDE 1800 MG: 100 INJECTION, POWDER, LYOPHILIZED, FOR SOLUTION INTRAVENOUS at 17:58

## 2017-03-09 SDOH — ECONOMIC STABILITY: HOUSING INSECURITY: UNSAFE APPLIANCES: 0

## 2017-03-09 SDOH — ECONOMIC STABILITY: HOUSING INSECURITY: UNSAFE COOKING RANGE AREA: 0

## 2017-03-09 ASSESSMENT — ENCOUNTER SYMPTOMS
WEAKNESS: 1
DIZZINESS: 1
SEVERE DYSPNEA: 1
HEADACHES: 0
MENTAL STATUS CHANGE: 0
FEVER: 0
DEBILITATING PAIN: 1
MENTAL STATUS CHANGE: 1
NAUSEA: 0
MYALGIAS: 0
VOMITING: 0
BACK PAIN: 0
SHORTNESS OF BREATH: T
CHILLS: 0
ABDOMINAL PAIN: 0
SEVERE DYSPNEA: 1

## 2017-03-09 ASSESSMENT — PAIN SCALES - GENERAL
PAINLEVEL_OUTOF10: 8
PAINLEVEL_OUTOF10: 2

## 2017-03-09 ASSESSMENT — LIFESTYLE VARIABLES
DO YOU DRINK ALCOHOL: NO
DO YOU DRINK ALCOHOL: NO
EVER_SMOKED: NEVER

## 2017-03-09 NOTE — IP AVS SNAPSHOT
" <p align=\"LEFT\"><IMG SRC=\"//[email or web address]$/Images/[email or web address]\" alt=\"Image\" WIDTH=\"50%\" HEIGHT=\"200\" BORDER=\"\"></p>                   Name:Kristin Balderrama  Medical Record Number:[id number]  CSN: [id number]    YOB: 1990   Age: 26 y.o.  Sex: female  HT:  WT: 125.4 kg (276 lb 7.3 oz)          Admit Date: 3/9/2017     Discharge Date:   Today's Date: 3/11/2017  Attending Doctor:  Madhavi Stephens M.D.                  Allergies:  Cefdinir; Depakote; Abilify; Amitriptyline; Amoxicillin; Ciprofloxacin; Clindamycin; Doxycycline; Ees; Flagyl; Flomax; Metformin; Sulfa drugs; Tape; Vancomycin; Cephalexin; and Levofloxacin          Your appointments     Mar 14, 2017  3:00 PM   NEW MISC Infusion 2 HR with RN 1   Infusion Services ([facility])    [address] [facility] [address]  [facility] [address]   [phone]            Mar 15, 2017  9:00 AM   Individual Therapy with Blanca Brantley L.C.S.W.   [facility] [place])    [address]  [address]  [facility] [id number] [phone]            Mar 17, 2017  8:30 AM   Follow Up Med Management with Ronny Burnette M.D.   [facility]IOR[facility] [address] [place] [address])    [address] [facility]  [address]  [facility] [address]   [phone]            Mar 29, 2017  9:00 AM   Individual Therapy with Joanne Fontana, L.C.S.W. [facility] [place])    [address]  [address]  [facility] [id number] [phone]            Apr 12, 2017  9:00 AM   Individual Therapy with Blanca Brantley L.C.S.W.   [facility])    [address] [facility]  [address]  [facility] [id number] [phone]            Apr 28, 2017 11:40 AM   New Patient with C Rotation   [facility] Sleep Medicine (--)    [address] [facility] [place] [address] [phone]           Please bring enclosed paperwork completed along with your insurance card and photo ID.            May 03, 2017  3:40 PM   Follow Up Visit with Sandoval Fox M.D.   [facility] Neurology (--)    [address], [address]  [facility] [id number] [phone]           You will be receiving a confirmation call a few days before your appointment from our automated call confirmation system.            Jul 19, 2017  8:40 AM   FOLLOW UP with Torres Kumar M.D.   [facility] (--)    [address]  [facility] [id number] [phone]              Follow-up Information     1. Follow up with Ben Montanez M.D. In 2 weeks.    Specialty:  Urology    Contact information    [address]  [facility] [address] [phone]          2. Follow up with Torres Brody M.D. In 2 weeks.    Specialty:  Internal Medicine    Contact information    [address]  [facility] [id number] [phone]           Medication List      Take these Medications        Instructions    albuterol 108 (90 BASE) MCG/ACT Aers inhalation aerosol    Inhale 2 Puffs by mouth every 6 hours as needed for Shortness of Breath.   Dose:  2 Puff       aspirin EC 81 MG Tbec   Commonly known as:  ECOTRIN    Take 1 Tab by mouth every day.   Dose:  81 mg       atorvastatin 40 MG Tabs   Commonly known as:  LIPITOR    Take 1 Tab by mouth every bedtime.   Dose:  40 mg       cefdinir 300 MG Caps   Commonly known as:  OMNICEF   Notes to Patient:  This is your antibiotic.    Take 1 Cap by mouth 2 times a day for 7 days.   Dose:  300 mg       CORLANOR 5 MG Tabs tablet   Generic drug:  ivabradine    Take 5 mg by mouth 2 times a day, with meals.   Dose:  5 mg       Cranberry 1000 MG Caps   Notes to Patient:  To help prevent UTI    Take 1 Cap by mouth 2 times a day, with meals.   Dose:  1 Cap       cyclobenzaprine 10 MG Tabs   Commonly known as:  FLEXERIL    Take 10 mg by mouth 2 Times a Day. Indications: Muscle Spasm   Dose:  10 mg       fentanyl 25 MCG/HR Pt72   Commonly known as:  DURAGESIC    Apply 1 Patch to skin as directed every 72 hours.   Dose:  1 Patch       fluoxetine 10 MG Caps   Commonly known as:   PROZAC    Take 1 Cap by mouth every day.   Dose:  10 mg       furosemide 20 MG Tabs   Commonly known as:  LASIX    Take 20 mg by mouth every day.   Dose:  20 mg       gabapentin 600 MG tablet   Commonly known as:  NEURONTIN    Take 600-1,200 mg by mouth 2 times a day. 600 mg AM  1200 mg PM   Dose:  600-1200 mg       HM VITAMIN B12 500 MCG tablet   Generic drug:  cyanocobalamin    Take 1,000 mcg by mouth 2 times a day.   Dose:  1000 mcg       lactulose 10 GM/15ML Soln    Take 10 g by mouth 2 times a day.   Dose:  10 g       levothyroxine 75 MCG Tabs   Commonly known as:  SYNTHROID    Take 75 mcg by mouth Every morning on an empty stomach.   Dose:  75 mcg       Melatonin 5 MG Tabs    Doctor's comments:  Takes two tabs at Bedtime (10 mg.)   Take 5 mg by mouth 1 time daily as needed (for sleep).   Dose:  5 mg       nystatin Powd   Commonly known as:  MYCOSTATIN   Notes to Patient:  To prevent yeast    Apply 1 Application to affected area(s) 3 times a day.   Dose:  1 Application       omeprazole 20 MG delayed-release capsule   Commonly known as:  PRILOSEC   Notes to Patient:  In the evening    Take 20 mg by mouth 2 times a day.   Dose:  20 mg       oxycodone-acetaminophen  MG Tabs   Commonly known as:  PERCOCET-10   Notes to Patient:  7:00 PM    Take 2 Tabs by mouth every 6 hours. Two tabs  Indications: Pain   Dose:  2 Tab       promethazine 25 MG Tabs   Commonly known as:  PHENERGAN    Take 1 Tab by mouth every 6 hours as needed for Nausea/Vomiting.   Dose:  25 mg       ranitidine 150 MG Tabs   Commonly known as:  ZANTAC    Take 150 mg by mouth 2 times a day.   Dose:  150 mg       sodium bicarbonate 325 MG Tabs    Take 2 Tabs by mouth 3 times a day.   Dose:  650 mg       vitamin D (Ergocalciferol) 66353 UNITS Caps capsule   Commonly known as:  DRISDOL    Take 50,000 Units by mouth every Friday.   Dose:  96252 Units       ziprasidone 80 MG Caps   Commonly known as:  GEODON    Take 160 mg by mouth every evening.      Dose:  160 mg

## 2017-03-09 NOTE — IP AVS SNAPSHOT
Home Care Instructions                                                                                                                  Name:Kristin Balderrama  Medical Record Number:[id number]  CSN: [id number]    YOB: 1990   Age: 26 y.o.  Sex: female  HT:  WT: 125.4 kg (276 lb 7.3 oz)          Admit Date: 3/9/2017     Discharge Date:   Today's Date: 3/11/2017  Attending Doctor:  Madhavi Stephens M.D.                  Allergies:  Cefdinir; Depakote; Abilify; Amitriptyline; Amoxicillin; Ciprofloxacin; Clindamycin; Doxycycline; Ees; Flagyl; Flomax; Metformin; Sulfa drugs; Tape; Vancomycin; Cephalexin; and Levofloxacin            Discharge Instructions       Discharge Instructions    Discharged to home by car with friend. Discharged via wheelchair, hospital escort: Yes.  Special equipment needed: Not Applicable    Be sure to schedule a follow-up appointment with your primary care doctor or any specialists as instructed.     Discharge Plan:   Diet Plan: Discussed  Activity Level: Discussed  Confirmed Follow up Appointment: Patient to Call and Schedule Appointment  Confirmed Symptoms Management: Discussed  Medication Reconciliation Updated: Yes  Influenza Vaccine Indication: Not indicated: Previously immunized this influenza season and > 8 years of age    I understand that a diet low in cholesterol, fat, and sodium is recommended for good health. Unless I have been given specific instructions below for another diet, I accept this instruction as my diet prescription.   Other diet: Low sodium, low fat, well-balanced diet    Special Instructions: None    · Is patient discharged on Warfarin / Coumadin?   No     · Is patient Post Blood Transfusion?  No    Depression / Suicide Risk    As you are discharged from this [facility] facility, it is important to learn how to keep safe from harming yourself.    Recognize the warning signs:  · Abrupt changes in personality, positive or negative- including increase in  energy   · Giving away possessions  · Change in eating patterns- significant weight changes-  positive or negative  · Change in sleeping patterns- unable to sleep or sleeping all the time   · Unwillingness or inability to communicate  · Depression  · Unusual sadness, discouragement and loneliness  · Talk of wanting to die  · Neglect of personal appearance   · Rebelliousness- reckless behavior  · Withdrawal from people/activities they love  · Confusion- inability to concentrate     If you or a loved one observes any of these behaviors or has concerns about self-harm, here's what you can do:  · Talk about it- your feelings and reasons for harming yourself  · Remove any means that you might use to hurt yourself (examples: pills, rope, extension cords, firearm)  · Get professional help from the community (Mental Health, Substance Abuse, psychological counseling)  · Do not be alone:Call your Safe Contact- someone whom you trust who will be there for you.  · Call your local CRISIS HOTLINE 483-9068 or 890-165-6406  · Call your local Children's Mobile Crisis Response Team [place] (500) 654-1636 or wwwTheLadders  · Call the toll free National Suicide Prevention Hotlines   · National Suicide Prevention Lifeline 131-341-HKSK (6811)  · National Hope Line Network 800-SUICIDE (808-6493)    Urinary Tract Infection  Urinary tract infections (UTIs) can develop anywhere along your urinary tract. Your urinary tract is your body's drainage system for removing wastes and extra water. Your urinary tract includes two kidneys, two ureters, a bladder, and a urethra. Your kidneys are a pair of bean-shaped organs. Each kidney is about the size of your fist. They are located below your ribs, one on each side of your spine.  CAUSES  Infections are caused by microbes, which are microscopic organisms, including fungi, viruses, and bacteria. These organisms are so small that they can only be seen through a microscope. Bacteria are the  microbes that most commonly cause UTIs.  SYMPTOMS   Symptoms of UTIs may vary by age and gender of the patient and by the location of the infection. Symptoms in young women typically include a frequent and intense urge to urinate and a painful, burning feeling in the bladder or urethra during urination. Older women and men are more likely to be tired, shaky, and weak and have muscle aches and abdominal pain. A fever may mean the infection is in your kidneys. Other symptoms of a kidney infection include pain in your back or sides below the ribs, nausea, and vomiting.  DIAGNOSIS  To diagnose a UTI, your caregiver will ask you about your symptoms. Your caregiver also will ask to provide a urine sample. The urine sample will be tested for bacteria and white blood cells. White blood cells are made by your body to help fight infection.  TREATMENT   Typically, UTIs can be treated with medication. Because most UTIs are caused by a bacterial infection, they usually can be treated with the use of antibiotics. The choice of antibiotic and length of treatment depend on your symptoms and the type of bacteria causing your infection.  HOME CARE INSTRUCTIONS  · If you were prescribed antibiotics, take them exactly as your caregiver instructs you. Finish the medication even if you feel better after you have only taken some of the medication.  · Drink enough water and fluids to keep your urine clear or pale yellow.  · Avoid caffeine, tea, and carbonated beverages. They tend to irritate your bladder.  · Empty your bladder often. Avoid holding urine for long periods of time.  · Empty your bladder before and after sexual intercourse.  · After a bowel movement, women should cleanse from front to back. Use each tissue only once.  SEEK MEDICAL CARE IF:   · You have back pain.  · You develop a fever.  · Your symptoms do not begin to resolve within 3 days.  SEEK IMMEDIATE MEDICAL CARE IF:   · You have severe back pain or lower abdominal  pain.  · You develop chills.  · You have nausea or vomiting.  · You have continued burning or discomfort with urination.  MAKE SURE YOU:   · Understand these instructions.  · Will watch your condition.  · Will get help right away if you are not doing well or get worse.     This information is not intended to replace advice given to you by your health care provider. Make sure you discuss any questions you have with your health care provider.     Document Released: 09/27/2006 Document Revised: 01/08/2016 Document Reviewed: 01/25/2013  [employer] Interactive Patient Education ©2016 [employer]vier Inc.    Cefdinir capsules  What is this medicine?  CEFDINIR (SEF di ner) is a cephalosporin antibiotic. It is used to treat certain kinds of bacterial infections. It will not work for colds, flu, or other viral infections.  This medicine may be used for other purposes; ask your health care provider or pharmacist if you have questions.  COMMON BRAND NAME(S): Omnicef  What should I tell my health care provider before I take this medicine?  They need to know if you have any of these conditions:  -bleeding problems  -kidney disease  -stomach or intestine problems (especially colitis)  -an unusual or allergic reaction to cefdinir, other cephalosporin antibiotics, penicillin, penicillamine, other foods, dyes or preservatives  -pregnant or trying to get pregnant  -breast-feeding  How should I use this medicine?  Take this medicine by mouth. Swallow it with a drink of water. Follow the directions on the prescription label. You can take it with or without food. If it upsets your stomach it may help to take it with food. Take your doses at regular intervals. Do not take it more often than directed. Finish all the medicine you are prescribed even if you think your infection is better.  Talk to your pediatrician regarding the use of this medicine in children. Special care may be needed.  Overdosage: If you think you have taken too much of this  medicine contact a poison control center or emergency room at once.  NOTE: This medicine is only for you. Do not share this medicine with others.  What if I miss a dose?  If you miss a dose, take it as soon as you can. If it is almost time for your next dose, take only that dose. Do not take double or extra doses.  What may interact with this medicine?  -antacids that contain aluminum or magnesium  -iron supplements  -other antibiotics  -probenecid  This list may not describe all possible interactions. Give your health care provider a list of all the medicines, herbs, non-prescription drugs, or dietary supplements you use. Also tell them if you smoke, drink alcohol, or use illegal drugs. Some items may interact with your medicine.  What should I watch for while using this medicine?  Tell your doctor or health care professional if your symptoms do not get better in a few days.  If you are diabetic you may get a false-positive result for sugar in your urine. Check with your doctor or health care professional before you change your diet or the dose of your diabetes medicine.  What side effects may I notice from receiving this medicine?  Side effects that you should report to your doctor or health care professional as soon as possible:  -allergic reactions like skin rash, itching or hives, swelling of the face, lips, or tongue  -breathing problems  -fever or chills  -redness, blistering, peeling or loosening of the skin, including inside the mouth  -seizures  -severe or watery diarrhea  -sore throat  -swollen joints  -trouble passing urine or change in the amount of urine  -unusual bleeding or bruising  -unusually weak or tired  Side effects that usually do not require medical attention (report to your doctor or health care professional if they continue or are bothersome):  -constipation  -dizziness  -gas or heartburn  -headache  -loss of appetite  -nausea or vomiting  -stomach pain  -stool discoloration  -vaginal  itching  This list may not describe all possible side effects. Call your doctor for medical advice about side effects. You may report side effects to FDA at 3-232-TGS-8093.  Where should I keep my medicine?  Keep out of the reach of children.  Store at room temperature between 15 and 30 degrees C (59 and 86 degrees F). Throw the medicine away after the expiration date.  NOTE: This sheet is a summary. It may not cover all possible information. If you have questions about this medicine, talk to your doctor, pharmacist, or health care provider.  © 2014, Elsevier/Gold Standard. (2/27/2009 4:02:53 PM)      Your appointments     Mar 14, 2017  3:00 PM   NEW MISC Infusion 2 HR with RN 1   Infusion Services ([facility])    [address] [facility] [address]  [place] [address]   [phone]            Mar 15, 2017  9:00 AM   Individual Therapy with Blanca Brantley L.C.S.W.   [facility])    [address]  [address]  [place] [id number] [phone]            Mar 17, 2017  8:30 AM   Follow Up Med Management with Ronny Burnette M.D.   [facility] [address] ([facility])    [address] [facility]  [address]  [place] [address]   [phone]            Mar 29, 2017  9:00 AM   Individual Therapy with Blanca Brantley L.C.S.W.   [facility] ([facility] [place] [facility])    [address]  [address]  [place] [address]   [phone]            Apr 12, 2017  9:00 AM   Individual Therapy with Blanca Brantley L.C.S.W.   [facility] ([facility] [place] [facility])    [address]  [address]  [place] [address]   [phone]            Apr 28, 2017 11:40 AM   New Patient with C Rotation   [facility] (--)    [address]  [place] [address] [phone]           Please bring enclosed paperwork completed along with your insurance card and photo ID.            May 03, 2017  3:40 PM   Follow Up Visit with Sandoval Fox M.D.   [facility] (--)    [address], [facility]  [address]  [place] [address] [phone]           You will be receiving a confirmation call a few days before your appointment from our automated call confirmation system.            Jul 19, 2017  8:40 AM   FOLLOW UP with Torres Kumar M.D.   [facility] (--)    [address] [facility] [address] [facility] [address]  [place] [address] [phone]              Follow-up Information     1. Follow up with Ben Montanez M.D. In 2 weeks.    Specialty:  Urology    Contact information    [address]  [place] [address]  [phone]          2. Follow up with Torres Brody M.D. In 2 weeks.    Specialty:  Internal Medicine    Contact information    [address]  [address]  [place] [address] [phone]           Discharge Medication Instructions:    Below are the medications your physician expects you to take upon discharge:    Review all your home medications and newly ordered medications with your doctor and/or pharmacist. Follow medication instructions as directed by your doctor and/or pharmacist.    Please keep your medication list with you and share with your physician.               Medication List      START taking these medications        Instructions    cefdinir 300 MG Caps   Commonly known as:  OMNICEF   Next Dose Due:  3/11/2017 in the evening   Notes to Patient:  This is your antibiotic.    Take 1 Cap by mouth 2 times a day for 7 days.   Dose:  300 mg       Cranberry 1000 MG Caps   Next Dose Due:  3/11/2017   Notes to Patient:  To help prevent UTI    Take 1 Cap by mouth 2 times a day, with meals.   Dose:  1 Cap       nystatin Powd   Last time this was given:  1 Application on 3/11/2017  9:00 AM   Commonly known as:  MYCOSTATIN   Next Dose Due:  3/11/2017 in the evening   Notes to Patient:  To prevent yeast    Apply 1 Application to affected area(s) 3 times a day.   Dose:  1 Application         CONTINUE taking these medications        Instructions    albuterol 108 (90 BASE) MCG/ACT Aers  inhalation aerosol   Last time this was given:  2 Puffs on 3/10/2017  1:37 PM    Inhale 2 Puffs by mouth every 6 hours as needed for Shortness of Breath.   Dose:  2 Puff       aspirin EC 81 MG Tbec   Last time this was given:  81 mg on 3/11/2017  7:58 AM   Commonly known as:  ECOTRIN   Next Dose Due:  3/12/2017 in the morning    Take 1 Tab by mouth every day.   Dose:  81 mg       atorvastatin 40 MG Tabs   Last time this was given:  40 mg on 3/10/2017  8:14 PM   Commonly known as:  LIPITOR   Next Dose Due:  3/11/2017 in the evening    Take 1 Tab by mouth every bedtime.   Dose:  40 mg       CORLANOR 5 MG Tabs tablet   Generic drug:  ivabradine   Next Dose Due:  3/11/2017 in the evening    Take 5 mg by mouth 2 times a day, with meals.   Dose:  5 mg       cyclobenzaprine 10 MG Tabs   Commonly known as:  FLEXERIL   Next Dose Due:  3/11/2017    Take 10 mg by mouth 2 Times a Day. Indications: Muscle Spasm   Dose:  10 mg       fentanyl 25 MCG/HR Pt72   Last time this was given:  1 Patch on 3/10/2017  1:45 PM   Commonly known as:  DURAGESIC   Next Dose Due:  3/13/2017    Apply 1 Patch to skin as directed every 72 hours.   Dose:  1 Patch       fluoxetine 10 MG Caps   Last time this was given:  10 mg on 3/11/2017  7:58 AM   Commonly known as:  PROZAC   Next Dose Due:  3/12/2017 in the morning    Take 1 Cap by mouth every day.   Dose:  10 mg       furosemide 20 MG Tabs   Commonly known as:  LASIX   Next Dose Due:  3/12/2017 in the morning    Take 20 mg by mouth every day.   Dose:  20 mg       gabapentin 600 MG tablet   Commonly known as:  NEURONTIN   Next Dose Due:  3/11/2017 in the evening    Take 600-1,200 mg by mouth 2 times a day. 600 mg AM  1200 mg PM   Dose:  600-1200 mg       HM VITAMIN B12 500 MCG tablet   Last time this was given:  1,000 mcg on 3/11/2017  7:58 AM   Generic drug:  cyanocobalamin   Next Dose Due:  3/11/2017 in the evening    Take 1,000 mcg by mouth 2 times a day.   Dose:  1000 mcg       lactulose 10  GM/15ML Soln   Next Dose Due:  3/11/2017 in the evening    Take 10 g by mouth 2 times a day.   Dose:  10 g       levothyroxine 75 MCG Tabs   Last time this was given:  75 mcg on 3/11/2017  6:08 AM   Commonly known as:  SYNTHROID   Next Dose Due:  3/12/2017 in the morning    Take 75 mcg by mouth Every morning on an empty stomach.   Dose:  75 mcg       Melatonin 5 MG Tabs   Next Dose Due:  3/11/2017 in the evening    Doctor's comments:  Takes two tabs at Bedtime (10 mg.)   Take 5 mg by mouth 1 time daily as needed (for sleep).   Dose:  5 mg       omeprazole 20 MG delayed-release capsule   Last time this was given:  20 mg on 3/11/2017  7:58 AM   Commonly known as:  PRILOSEC   Next Dose Due:  3/11/2017   Notes to Patient:  In the evening    Take 20 mg by mouth 2 times a day.   Dose:  20 mg       oxycodone-acetaminophen  MG Tabs   Last time this was given:  2 Tabs on 3/11/2017 12:43 PM   Commonly known as:  PERCOCET-10   Next Dose Due:  3/11/2017   Notes to Patient:  7:00 PM    Take 2 Tabs by mouth every 6 hours. Two tabs  Indications: Pain   Dose:  2 Tab       promethazine 25 MG Tabs   Last time this was given:  25 mg on 3/11/2017  1:50 PM   Commonly known as:  PHENERGAN    Take 1 Tab by mouth every 6 hours as needed for Nausea/Vomiting.   Dose:  25 mg       ranitidine 150 MG Tabs   Commonly known as:  ZANTAC   Next Dose Due:  3/11/2017 in the evening    Take 150 mg by mouth 2 times a day.   Dose:  150 mg       sodium bicarbonate 325 MG Tabs   Last time this was given:  650 mg on 3/11/2017  7:58 AM   Next Dose Due:  3/11/2017 in the evening    Take 2 Tabs by mouth 3 times a day.   Dose:  650 mg       vitamin D (Ergocalciferol) 68389 UNITS Caps capsule   Last time this was given:  50,000 Units on 3/10/2017  9:21 AM   Commonly known as:  DRISDOL   Next Dose Due:  3/17/2017    Take 50,000 Units by mouth every Friday.   Dose:  86772 Units       ziprasidone 80 MG Caps   Last time this was given:  160 mg on 3/10/2017   8:17 PM   Commonly known as:  GEODON   Next Dose Due:  3/11/2017    Take 160 mg by mouth every evening.   Dose:  160 mg         STOP taking these medications     nitrofurantoin monohydr macro 100 MG Caps   Commonly known as:  MACROBID               Instructions           Diet / Nutrition:    Follow any diet instructions given to you by your doctor or the dietician, including how much salt (sodium) you are allowed each day.    If you are overweight, talk to your doctor about a weight reduction plan.    Activity:    Remain physically active following your doctor's instructions about exercise and activity.    Rest often.     Any time you become even a little tired or short of breath, SIT DOWN and rest.    Worsening Symptoms:    Report any of the following signs and symptoms to the doctor's office immediately:    *Pain of jaw, arm, or neck  *Chest pain not relieved by medication                               *Dizziness or loss of consciousness  *Difficulty breathing even when at rest   *More tired than usual                                       *Bleeding drainage or swelling of surgical site  *Swelling of feet, ankles, legs or stomach                 *Fever (>100ºF)  *Pink or blood tinged sputum  *Weight gain (3lbs/day or 5lbs /week)           *Shock from internal defibrillator (if applicable)  *Palpitations or irregular heartbeats                *Cool and/or numb extremities    Stroke Awareness    Common Risk Factors for Stroke include:    Age  Atrial Fibrillation  Carotid Artery Stenosis  Diabetes Mellitus  Excessive alcohol consumption  High blood pressure  Overweight   Physical inactivity  Smoking    Warning signs and symptoms of a stroke include:    *Sudden numbness or weakness of the face, arm or leg (especially on one side of the body).  *Sudden confusion, trouble speaking or understanding.  *Sudden trouble seeing in one or both eyes.  *Sudden trouble walking, dizziness, loss of balance or coordination.Sudden  severe headache with no known cause.    It is very important to get treatment quickly when a stroke occurs. If you experience any of the above warning signs, call 911 immediately.                   Disclaimer         Quit Smoking / Tobacco Use:    I understand the use of any tobacco products increases my chance of suffering from future heart disease or stroke and could cause other illnesses which may shorten my life. Quitting the use of tobacco products is the single most important thing I can do to improve my health. For further information on smoking / tobacco cessation call a Toll Free Quit Line at 1-351.314.6285 (*[employer] [place]) or 1-711.904.6137 (American Lung Association) or you can access the web based program at www.lungusa.org.    [place] Tobacco Users Help Line:  (768) 231-3286       Toll Free: 1-653.915.3376  Quit Tobacco Program [facility] (109)187-2479    Crisis Hotline:    [place] Crisis Hotline:  4-193-ELLVVHQ or 1-674.749.1041    [place] Crisis Hotline:    1-391.140.9617 or 673-017-3867    Discharge Survey:   Thank you for choosing [facility]. We hope we did everything we could to make your hospital stay a pleasant one. You may be receiving a phone survey and we would appreciate your time and participation in answering the questions. Your input is very valuable to us in our efforts to improve our service to our patients and their families.        My signature on this form indicates that:    1. I have reviewed and understand the above information.  2. My questions regarding this information have been answered to my satisfaction.  3. I have formulated a plan with my discharge nurse to obtain my prescribed medications for home.                  Disclaimer         __________________________________                     __________       ________                       Patient Signature                                                 Date                    Time

## 2017-03-09 NOTE — IP AVS SNAPSHOT
Primekss Access Code: Activation code not generated  Current Primekss Status: Active    [employer]  A secure, online tool to manage your health information     [employer]’s Primekss® is a secure, online tool that connects you to your personalized health information from the privacy of your home -- day or night - making it very easy for you to manage your healthcare. Once the activation process is completed, you can even access your medical information using the Primekss rocio, which is available for free in the Apple Rocio store or Google Play store.     Primekss provides the following levels of access (as shown below):   My Chart Features   [facility] Primary Care Doctor [facility]  Specialists [facility]  Urgent  Care Non-[facility]  Primary Care  Doctor   Email your healthcare team securely and privately 24/7 X X X X   Manage appointments: schedule your next appointment; view details of past/upcoming appointments X      Request prescription refills. X      View recent personal medical records, including lab and immunizations X X X X   View health record, including health history, allergies, medications X X X X   Read reports about your outpatient visits, procedures, consult and ER notes X X X X   See your discharge summary, which is a recap of your hospital and/or ER visit that includes your diagnosis, lab results, and care plan. X X       How to register for Primekss:  1. Go to  [email or web address].  2. Click on the Sign Up Now box, which takes you to the New Member Sign Up page. You will need to provide the following information:  a. Enter your Primekss Access Code exactly as it appears at the top of this page. (You will not need to use this code after you’ve completed the sign-up process. If you do not sign up before the expiration date, you must request a new code.)   b. Enter your date of birth.   c. Enter your home email address.   d. Click Submit, and follow the next screen’s instructions.  3. Create a Primekss ID. This will  be your [email or web address] login ID and cannot be changed, so think of one that is secure and easy to remember.  4. Create a [email or web address] password. You can change your password at any time.  5. Enter your Password Reset Question and Answer. This can be used at a later time if you forget your password.   6. Enter your e-mail address. This allows you to receive e-mail notifications when new information is available in [email or web address].  7. Click Sign Up. You can now view your health information.    For assistance activating your [email or web address] account, call [phone]

## 2017-03-09 NOTE — PROGRESS NOTES
Subjective:      Kristin Balderrama is a 26 y.o. female who presents with Dysuria            HPI Comments: Medications, Allergies and Prior Medical Hx reviewed and updated in [facility].with patient/family today     Patient was recently in the hospital for strokelike symptoms. At that time it was discovered she had a urinary tract infection. Patient has had an indwelling Andrade catheter for the last year. She was placed on Macrobid and discharged from the hospital.. Patient states that she believes she still has tract infection. She had labs drawn yesterday at her primary care providers. They show signs of a urinary tract infection with an elevated white blood cell count. Patient states she's felt bad. She has been confused, disoriented. She has pain over her lower abdomen. She was sent here by her home health nurse who was concerned about sepsis.      Review of Systems   Constitutional: Positive for malaise/fatigue. Negative for fever and chills.   Gastrointestinal: Negative for nausea, vomiting and abdominal pain.   Musculoskeletal: Negative for myalgias and back pain.   Neurological: Positive for dizziness and weakness. Negative for headaches.          Objective:     /86 mmHg  Pulse 118  Temp(Src) 36.6 °C (97.8 °F)  Resp 22  SpO2 94%  LMP 07/16/2016     Physical Exam   Constitutional: She appears well-developed and well-nourished.   HENT:   Head: Normocephalic and atraumatic.   Eyes: Pupils are equal, round, and reactive to light.   Cardiovascular: Normal rate, regular rhythm and normal heart sounds.    Pulmonary/Chest: Effort normal and breath sounds normal. No respiratory distress.   Abdominal: Soft. She exhibits no distension. There is no tenderness.   Neurological: She is alert.   Awake, alert, answering questions appropriately, moving all extremeties   Skin: Skin is warm and dry.   Vitals reviewed.              Assessment/Plan:       1. Urinary tract infection associated with indwelling urethral  catheter, subsequent encounter (CMS-[facility])     2. Tachycardia     3. Confusion       Results for HARLEY DE LA CRUZ (MRN [id number]) as of 3/9/2017 11:56   3/9/2017 10:56   POC Color Orange   POC Appearance Cloudy   POC Specific [place] 1.010   POC Urine PH 8.5 (A)   POC Glucose Negative   POC Ketones Negative   POC Protein 2000   POC Nitrites Positive   POC Leukocyte Esterase Large   POC Blood Large Hemolyzed   POC Biliruben Negative   POC Urobiligen 1   POC Urine Pregnancy Test Negative     D/w pt/family recommendation to transfer to ED for evaluation and treatment not available at this facility, they verbalize agreement and request [facility]  D/w Dr Mancia  who accepted patient   Recommended transport by ambulance pt/family agreed. Report to [facility] paramedic

## 2017-03-09 NOTE — ED NOTES
"Pt sukhdeep STALLWORTH, arrives to triage stating \"I am here for possible sepsis.\"  Pt sent to ER from Urgent Care for n/v, chills & painful urination (pt has subrapubic catheter in place) x2 days.  Pt assessment is extremely difficult as pt states \"I don't know, I don't remember\" to most medical history questions.  A&ox4.  Pt to lobby & advised to inform RN of any changes.    "

## 2017-03-09 NOTE — ED NOTES
"Pt escorted to [facility], states it hurts at the suprapubic site, and just \"feels sick\" with lack of appetite.   "

## 2017-03-09 NOTE — IP AVS SNAPSHOT
3/11/2017          Kristin Balderrama  [address] [place] [address]    Dear Kristin:    [facility] wants to ensure your discharge home is safe and you or your loved ones have had all your questions answered regarding your care after you leave the hospital.    You may receive a telephone call within two days of your discharge.  This call is to make certain you understand your discharge instructions as well as ensure we provided you with the best care possible during your stay with us.     The call will only last approximately 3-5 minutes and will be done by a nurse.    Once again, we want to ensure your discharge home is safe and that you have a clear understanding of any next steps in your care.  If you have any questions or concerns, please do not hesitate to contact us, we are here for you.  Thank you for choosing [facility] for your healthcare needs.    Sincerely,    Lorenzo Zuleta    [facility]

## 2017-03-09 NOTE — MR AVS SNAPSHOT
"        Kristin Balderrama   3/9/2017 10:45 AM   Office Visit   MRN: [id number]    Department:  [facility]   Dept Phone:  [phone]    Description:  Female : 1990   Provider:  AXEL Talbert           Reason for Visit     Dysuria pt home health nurse thinks she is septic due o her blood work and told the pt to come to        Allergies as of 3/9/2017     Allergen Noted Reactions    Cefdinir 2016   Shortness of Breath, Itching    Depakote [Divalproex Sodium] 2010   Unspecified    Muscle spasms/muscle pain and weakness      Abilify 2013   Unspecified    Headaches/muscle twitching      Amitriptyline 10/31/2013   Unspecified    Headaches      Amoxicillin 2010   Rash    Pt states \"I get a rash\".      Ciprofloxacin 2009   Rash    Pt states \"I get a rash\".      Clindamycin 2011   Nausea    Even with food      Doxycycline 08/15/2012   Vomiting, Nausea    RXN=unknown    Ees [Erythromycin] 2010   Vomiting, Nausea    Flagyl [Metronidazole Hcl] 2011   Unspecified    \"eye problems\"      Flomax [Tamsulosin Hydrochloride] 2009   Swelling    Metformin 2013   Unspecified    Increased lactic acid       Sulfa Drugs 2010   Hives, Rash    RXN=since childhood    Tape 08/15/2012   Rash    Tears skin off   coban with Tegaderm tape ok  RXN=ongoing    Vancomycin 07/10/2016   Itching    Pt becomes flushed in face and chest.   RXN=7/10/16    Cephalexin [Keflex] 2017   Rash    Pt states she gets a rash with this medication    Levofloxacin 10/27/2016   Unspecified    Leg muscle cramps      You were diagnosed with     Urinary tract infection associated with indwelling urethral catheter, subsequent encounter (CMS-[facility])   [0903890]       Tachycardia   [938387]       Confusion   [980774]         Vital Signs     Blood Pressure Pulse Temperature Respirations Oxygen Saturation Last Menstrual Period    128/86 mmHg 118 36.6 °C (97.8 °F) 22 94% 2016   "    Smoking Status                   Passive Smoke Exposure - Never Smoker           Basic Information     Date Of Birth Sex Race Ethnicity Preferred Language    1990 Female White Non- English      Your appointments     Mar 09, 2017  2:00 PM   SN-HH-HOME VISIT with Mary Goff L.P.N.   [facility] (--)    [address]  [place] [address]   [phone]            Mar 10, 2017 10:00 AM   MSW-HH-HOME VISIT with WINDY Corcoran   [facility] (--)    [address]  [place] [address]   [phone]            Mar 10, 2017 To Be Determined   SN-HH-HOME VISIT with Jonelle Santacruz R.N.   [facility] (--)    [address]  [place] [address]   [phone]            Mar 13, 2017  9:00 AM   PT-HH-HOME VISIT with Abhishek Baker, PABHIJEET   [facility] (--)    [address]  [place] [address]   [phone]            Mar 14, 2017 To Be Determined   SN-HH-HOME VISIT with Shelbi Ruby R.N.   [facility] (--)    [address]  [place] [address]   [phone]            Mar 14, 2017  3:00 PM   NEW MISC Infusion 2 HR with RN 1   [facility] ([facility])    [address]  [place] [address] [phone]            Mar 15, 2017  9:00 AM   Individual Therapy with Blanca Brantley L.C.S.W.   [facility]    [address]  [address]  [place] [id number] [phone]            Mar 16, 2017  9:00 AM   PT-HH-HOME VISIT with Abhishek Baker, P.CARL   [facility] (--)    [address]  [place] [address]   [phone]            Mar 17, 2017  8:30 AM   Follow Up Med Management with Ronny Burnette M.D.   BEHAVIORAL HEALTH [address] [place] [address])    [address]  [address]  [place] [address]   [phone]            Mar 17, 2017 To Be Determined   SN-HH-HOME VISIT with TAY [facility] (--)    [facility]  [place] [address]   [phone]            Mar 21, 2017  9:00 AM   PT-HH-HOME  VISIT with Abhishek Baker P.T.   [facility] (--)    [address]  [place] [address]   [phone]            Mar 21, 2017 To Be Determined   SN-HH-HOME VISIT with Shelbi Ruby R.N.   [facility] (--)    [address]  [place] [address]   [phone]            Mar 23, 2017  9:00 AM   AD-JU-QRQLDGNXGM DISCHARGE with Abhishek Baker P.T.   [facility] (--)    [address]  [place] [address]   [phone]            Mar 28, 2017 To Be Determined   SN-HH-HOME VISIT with Shelbi Ruby R.N.   [facility] (--)    [address]  [place] [address]   [phone]            Mar 29, 2017  9:00 AM   Individual Therapy with Blanca Brantley L.C.S.W.   [facility]    [address]  [address]  [place] [address] [phone]            Apr 04, 2017 To Be Determined   SN-HH-HOME VISIT with Shelbi Ruby R.N.   [facility] (--)    [address]  [place] [address]   [phone]            Apr 11, 2017 To Be Determined   SN-HH-HOME VISIT with Shelbi Ruby R.N.   [facility] (--)    [address]  [place] [address]   [phone]            Apr 12, 2017  9:00 AM   Individual Therapy with Blanca Brantley L.C.S.W.   [facility]    [address]  [address]  [place] [address] [phone]            Apr 20, 2017 To Be Determined   SN-HH-OASIS RECERTIFICATION with Shelbi Ruby R.N.   [facility] (--)    [address]  [place] [address]   [phone]            Apr 27, 2017 To Be Determined   SN-HH-HOME VISIT with Shelbi Ruby R.N.   [facility] (--)    [address]  [place] [address]   [phone]            Apr 28, 2017 11:40 AM   New Patient with C Marcelino   [facility] (--)    [address]  [place] [address] [phone]           Please bring enclosed paperwork completed along with your insurance card and photo ID.            May 03,  2017  3:40 PM   Follow Up Visit with Sandoval Fox M.D.   [facility] (--)    [address], [address]  [place] [address] [phone]           You will be receiving a confirmation call a few days before your appointment from our automated call confirmation system.            Jul 19, 2017  8:40 AM   FOLLOW UP with Torres Kumar M.D.   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 Low  9/18/2010 - Present    Neurogenic bladder N31.9 Low  4/2/2011 - Present    Sinus tachycardia (Chronic) R00.0 High  10/31/2013 - Present    Knee pain, right M25.561 Low  2/13/2014 - Present    Anxiety F41.9 Low  12/16/2014 - Present    Fatty liver disease, nonalcoholic K76.0 Low  1/19/2015 - Present    Progressive focal motor weakness M62.81 Low  6/28/2015 - Present    Chronic back pain M54.9, G89.29 Low  6/29/2015 - Present    Hypothyroidism (Chronic) E03.9 Low  11/23/2015 - Present    PCOS (polycystic ovarian syndrome) E28.2 Low  11/23/2015 - Present    H/O prior ablation treatment Z98.890   2/10/2016 - Present    Peripheral neuropathy (CMS-HCC) (Chronic) G62.9   3/6/2016 - Present    TONYA on CPAP G47.33 Low  3/7/2016 - Present    Morbidly obese (CMS-HCC) E66.01   3/7/2016 - Present    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   3/7/2016 - Present    Scoliosis M41.9   3/7/2016 - Present    GERD (gastroesophageal reflux disease) (Chronic) K21.9   3/7/2016 - Present    Vitamin D deficiency E55.9   5/21/2016 - Present    Chronic inflammatory arthritis (Chronic) M19.90 Medium  5/23/2016 - Present    Right flank pain R10.9   6/6/2016 - Present    Weakness of both lower extremities M62.81   6/22/2016 - Present    Elevated sedimentation rate R70.0   6/27/2016 - Present     "Galactorrhea O92.6   7/22/2016 - Present    Psychosis, schizophrenia, simple (HCC) (Chronic) F20.89 Low Chronic 9/29/2016 - Present    Schizophrenia (CMS-HCC) F20.9 Low  10/27/2016 - Present    Paralysis (CMS-[facility]) G83.9 High  10/27/2016 - Present    Chronic pain syndrome (Chronic) G89.4 Medium  10/27/2016 - Present    Suprapubic catheter (CMS-HCC) (Chronic) Z93.59 Low  10/27/2016 - Present    Bowel and bladder incontinence R32, R15.9   10/27/2016 - Present    Depression F32.9 Low  10/28/2016 - Present    HTN (hypertension) (Chronic) I10 Medium  11/1/2016 - Present    Quadriparesis (CMS-[facility]) G82.50 High  11/1/2016 - Present    Hypovitaminosis D E55.9   11/29/2016 - Present    Leg weakness M62.81   1/4/2017 - Present    Weakness R53.1   1/22/2017 - Present    Paraparesis of both lower limbs (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[facility]) 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 High  1/24/2017 - Present    Chronic suprapubic catheter (CMS-[facility]) Z93.59   2/16/2017 - Present    Leg weakness, bilateral M62.81   2/22/2017 - Present    Weakness of right upper extremity M62.81   2/23/2017 - Present      Health Maintenance        Date Due Completion Dates    IMM HEP A VACCINE (1 of 2 - Standard Series) 10/13/1990 ---    IMM VARICELLA (CHICKENPOX) VACCINE (1 of 2 - 2 Dose Adolescent Series) 10/13/2002 ---    PAP SMEAR 7/22/2019 7/22/2016 (Postponed), 2/19/2013 (N/S)    Override on 7/22/2016: Postponed (per pt was told could \"skip\" 2016)    Override on 2/19/2013: (N/S) ([facility])    IMM DTaP/Tdap/Td Vaccine (7 - Td) 8/6/2022 8/6/2012, 9/18/2010, 3/3/1994, 5/17/1991, 5/10/1990, 3/23/1990, 1990    COLONOSCOPY 9/25/2023 9/25/2013            Results     POCT Pregnancy      Component Value Standard Range & Units    POC Urine Pregnancy Test Negative Negative    Internal Control Positive Positive     Internal Control Negative Negative                 POCT Urinalysis      Component Value Standard Range & Units    POC Color Orange Negative    POC Appearance Cloudy Negative    POC Leukocyte Esterase Large " Negative    POC Nitrites Positive Negative    POC Urobiligen 1 Negative (0.2) mg/dL    POC Protein 2000 Negative mg/dL    POC Urine PH 8.5 5.0 - 8.0    POC Blood Large Hemolyzed Negative    POC Specific Gravity 1.010 <1.005 - >1.030    POC Ketones Negative Negative mg/dL    POC Biliruben Negative Negative mg/dL    POC Glucose Negative Negative mg/dL                        Current Immunizations     Dtap Vaccine 3/3/1994, 5/17/1991, 5/10/1990, 3/23/1990, 1990    HIB Vaccine(PEDVAX) 1/11/1991    HPV Quadrivalent Vaccine (GARDASIL) 10/27/2011, 5/27/2011, 3/1/2011    Hepatitis B Vaccine Non-Recombivax (Ped/Adol) 1/28/2004, 10/28/2003, 10/29/1999    Influenza TIV (IM) 9/5/2013, 12/7/2011, 11/7/2011    Influenza Vaccine Adult HD 10/5/2016    MMR Vaccine 3/3/1994, 1/11/1991    OPV - Historical Data 3/3/1994, 5/17/1991, 5/10/1990, 3/23/1990, 1990    Pneumococcal Vaccine (PCV7) Historical Data 11/8/2015    Pneumococcal polysaccharide vaccine (PPSV-23) 1/23/2017  5:35 PM    TD Vaccine 9/18/2010  4:45 PM    Tdap Vaccine 8/6/2012      Below and/or attached are the medications your provider expects you to take. Review all of your home medications and newly ordered medications with your provider and/or pharmacist. Follow medication instructions as directed by your provider and/or pharmacist. Please keep your medication list with you and share with your provider. Update the information when medications are discontinued, doses are changed, or new medications (including over-the-counter products) are added; and carry medication information at all times in the event of emergency situations     Allergies:  CEFDINIR - Shortness of Breath,Itching     DEPAKOTE - Unspecified     ABILIFY - Unspecified     AMITRIPTYLINE - Unspecified     AMOXICILLIN - Rash     CIPROFLOXACIN - Rash     CLINDAMYCIN - Nausea     DOXYCYCLINE - Vomiting,Nausea     EES - Vomiting,Nausea     FLAGYL - Unspecified     FLOMAX - Swelling     METFORMIN -  Unspecified     SULFA DRUGS - Hives,Rash     TAPE - Rash     VANCOMYCIN - Itching     CEPHALEXIN - Rash     LEVOFLOXACIN - Unspecified               Medications  Valid as of: March 09, 2017 - 11:27 AM    Generic Name Brand Name Tablet Size Instructions for use    Albuterol Sulfate (Aero Soln) albuterol 108 (90 BASE) MCG/ACT Inhale 2 Puffs by mouth every 6 hours as needed for Shortness of Breath.        Aspirin (Tablet Delayed Response) ECOTRIN 81 MG Take 1 Tab by mouth every day.        Atorvastatin Calcium (Tab) LIPITOR 40 MG Take 1 Tab by mouth every bedtime.        Cyanocobalamin (Tab) vitamin b12 500 MCG Take 1,000 mcg by mouth 2 times a day.        Cyclobenzaprine HCl (Tab) FLEXERIL 10 MG Take 10 mg by mouth 2 Times a Day. Indications: Muscle Spasm        Ergocalciferol (Cap) DRISDOL 04763 UNITS Take 50,000 Units by mouth every Friday.        FentaNYL (PATCH 72 HR) DURAGESIC 25 MCG/HR Apply 1 Patch to skin as directed every 72 hours.        FLUoxetine HCl (Cap) PROZAC 10 MG Take 1 Cap by mouth every day.        Furosemide (Tab) LASIX 20 MG Take 20 mg by mouth See Admin Instructions. Is taking 20 mg BID X2 days ONLY (on 2/21 and 2/22/17) then 1 tablet a day thereafter        Gabapentin (Once-Daily) (Tab) Gabapentin (Once-Daily) 600 MG Take 600 mg by mouth 3 times a day.        Ivabradine HCl (Tab) CORLANOR 5 MG Take 1 Tab by mouth 2 Times a Day.        Ivabradine HCl (Tab) CORLANOR 5 MG Take 5 mg by mouth 2 times a day, with meals.        Lactulose (Solution) lactulose 10 GM/15ML Take 10 g by mouth 2 times a day.        Levothyroxine Sodium (Tab) SYNTHROID 75 MCG Take 75 mcg by mouth Every morning on an empty stomach.        Melatonin (Tab) Melatonin 5 MG Take 5 mg by mouth 1 time daily as needed (for sleep).        Miconazole Nitrate (Cream) MICOTIN 2 % Apply 1 Application to affected area(s) 2 times a day.        Omeprazole (CAPSULE DELAYED RELEASE) PRILOSEC 20 MG Take 20 mg by mouth 2 times a day.         Oxycodone-Acetaminophen (Tab) PERCOCET-10  MG Take 2 Tabs by mouth every 6 hours. Two tabs  Indications: Pain        PredniSONE (Tab) DELTASONE 10 MG Take 50 mg for one day, Then 40 mg for one day, Then 30 mg for one day, Then 20 mg for one day, Then 10 mg for one day and stop        Promethazine HCl (Tab) PHENERGAN 25 MG Take 1 Tab by mouth every 6 hours as needed for Nausea/Vomiting.        RaNITidine HCl (Tab) ZANTAC 150 MG Take 150 mg by mouth 2 times a day.        Sodium Bicarbonate (Tab) sodium bicarbonate 325 MG Take 2 Tabs by mouth 3 times a day.        Zinc Oxide (Cream) Zinc Oxide 11.3 % Apply 1 Application to affected area(s) 1 time daily as needed (for skin irritations to coccyx).        Ziprasidone HCl (Cap) GEODON 80 MG Take 160 mg by mouth every evening.        .                 Medicines prescribed today were sent to:     [id number] - [place], [place] - [address] [address]    Phone: [phone] Fax: [phone]    Open 24 Hours?: No      Medication refill instructions:       If your prescription bottle indicates you have medication refills left, it is not necessary to call your provider’s office. Please contact your pharmacy and they will refill your medication.    If your prescription bottle indicates you do not have any refills left, you may request refills at any time through one of the following ways: The online Info system (except Urgent Care), by calling your provider’s office, or by asking your pharmacy to contact your provider’s office with a refill request. Medication refills are processed only during regular business hours and may not be available until the next business day. Your provider may request additional information or to have a follow-up visit with you prior to refilling your medication.   *Please Note: Medication refills are assigned a new Rx number when refilled electronically. Your pharmacy may indicate that no refills were  authorized even though a new prescription for the same medication is available at the pharmacy. Please request the medicine by name with the pharmacy before contacting your provider for a refill.        Other Notes About Your Plan     DME:  [facility] /  [phone] / fax [phone]              MyChart Access Code: Activation code not generated  Current MyChart Status: Active

## 2017-03-09 NOTE — ED PROVIDER NOTES
"ED Provider Note    Scribed for Emery Piña M.D. by Emery Piña. 3/9/2017,  3:58 PM.    CHIEF COMPLAINT  Chief Complaint   Patient presents with   • Sent from Urgent Care   • N/V     x2 days   • Chills   • Painful Urination       HPI  Kristin Balderrama is a 26 y.o. female who presents to the Emergency Department Pt sukhdeep DEJONSA from Urgent Care, stating \"I am here for possible sepsis.\"  Pt sent from Urgent Care for nausea and vomiting, chills & painful urination x2 days. The pt has suprapubic catheter in place. Per assessment is difficult as pt states \"I don't know, I don't remember\" to most medical history questions. She has a complicated medical history which includes significant psychiatric illness.     The patient reports her pain began last night around her lower abdominal region and noticed that the urine in her jara bag was brown, had mucus-like consistency, and \"smelled bad\". She states that she feels \"really sick\" and has progressively felt worse since arriving in the ED earlier today. The patient says she cannot produce urine and has taken diuretics with no relief. She reports that she cannot pass urine through her urethra like she normally can. She also states she is positive for chills and diaphoresis, but denies fever, constipation, or diarrhea.  The patient says she suffers from incontinence and sees Dr. Montanez for Urology care.    REVIEW OF SYSTEMS  See HPI for further details. All other systems are negative.     PAST MEDICAL HISTORY   has a past medical history of Scoliosis; Multiple personality disorder; Chronic UTI (9/18/2010); Heart burn; Pain (08-15-12); History of falling; Sinus tachycardia (10/31/2013); Urinary incontinence; Hypertension; Disorder of thyroid; Obesity; Pneumonia (2012); ASTHMA; Breath shortness; Anginal syndrome; Psychosis; Arthritis; PCOS (polycystic ovarian syndrome); Gynecological disorder; Renal disorder; Arrhythmia; Urinary bladder disorder; Tuberculosis; " Sleep apnea; and 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CMS-HCC).    SOCIAL HISTORY  Social History     Social History Main Topics   • Smoking status: Passive Smoke Exposure - Never Smoker     Types: Cigarettes   • Smokeless tobacco: Never Used   • Alcohol Use: No   • Drug Use: No   • Sexual Activity: Not Currently     Birth Control/ Protection: Pill     History   Drug Use No       SURGICAL HISTORY   has past surgical history that includes neuro dest facet l/s w/ig sngl (4/21/2015); recovery (1/27/2016); [place] by laparoscopy (8/29/2010); lumbar fusion anterior (8/21/2012); other cardiac surgery (1/2016); tonsillectomy; bowel stimulator insertion (7/13/2016); gastroscopy with balloon dilatation (N/A, 1/4/2017); and muscle biopsy (Right, 1/26/2017).    CURRENT MEDICATIONS  Home Medications     Reviewed by Dillon Trinidad R.N. (Registered Nurse) on 03/09/17 at 2113  Med List Status: Complete    Medication Last Dose Status    albuterol 108 (90 BASE) MCG/ACT Aero Soln inhalation aerosol >week Active    aspirin EC (ECOTRIN) 81 MG Tablet Delayed Response 3/9/2017 Active    atorvastatin (LIPITOR) 40 MG Tab 3/8/2017 Active    cyanocobalamin (HM VITAMIN B12) 500 MCG tablet 3/9/2017 Active    cyclobenzaprine (FLEXERIL) 10 MG Tab 3/9/2017 Active    fentanyl (DURAGESIC) 25 MCG/HR PATCH 72 HR 3/7/2017 Active    fluoxetine (PROZAC) 10 MG Cap 3/9/2017 Active    furosemide (LASIX) 20 MG Tab 3/9/2017 Active    gabapentin (NEURONTIN) 600 MG tablet 3/9/2017 Active    ivabradine (CORLANOR) 5 MG Tab tablet 3/9/2017 Active    lactulose 10 GM/15ML Solution 3/9/2017 Active    levothyroxine (SYNTHROID) 75 MCG Tab 2/22/2017 Active    Melatonin 5 MG Tab 3/8/2017 Active    nitrofurantoin monohydr macro (MACROBID) 100 MG Cap 3/5/2017 Active    omeprazole (PRILOSEC) 20 MG delayed-release capsule 3/9/2017 Active    oxycodone-acetaminophen (PERCOCET-10)  MG Tab 3/9/2017 Active    promethazine (PHENERGAN) 25 MG Tab 3/8/2017 Active    ranitidine (ZANTAC) 150 MG  "Tab 3/8/2017 Active    sodium bicarbonate 325 MG Tab 3/8/2017 Active    vitamin D, Ergocalciferol, (DRISDOL) 13184 UNITS Cap capsule 3/3/2017 Active    ziprasidone (GEODON) 80 MG Cap 3/8/2017 Active                ALLERGIES  Allergies   Allergen Reactions   • Cefdinir Shortness of Breath and Itching     Tolerated 1/18/17   • Depakote [Divalproex Sodium] Unspecified     Muscle spasms/muscle pain and weakness     • Abilify Unspecified     Headaches/muscle twitching     • Amitriptyline Unspecified     Headaches     • Amoxicillin Rash     Pt states \"I get a rash\".     • Ciprofloxacin Rash     Pt states \"I get a rash\".     • Clindamycin Nausea     Even with food     • Doxycycline Vomiting and Nausea     RXN=unknown   • Ees [Erythromycin] Vomiting and Nausea   • Flagyl [Metronidazole Hcl] Unspecified     \"eye problems\"     • Flomax [Tamsulosin Hydrochloride] Swelling   • Metformin Unspecified     Increased lactic acid      • Sulfa Drugs Hives and Rash     RXN=since childhood   • Tape Rash     Tears skin off   coban with Tegaderm tape ok  RXN=ongoing   • Vancomycin Itching     Pt becomes flushed in face and chest.   RXN=7/10/16   • Cephalexin [Keflex] Rash     Pt states she gets a rash with this medication   • Levofloxacin Unspecified     Leg muscle cramps       PHYSICAL EXAM  VITAL SIGNS: /100 mmHg  Pulse 76  Temp(Src) 36.8 °C (98.2 °F)  Resp 13  Wt 117.935 kg (260 lb)  SpO2 86%  LMP 07/16/2016  Pulse ox interpretation: I interpret this pulse ox as normal.  Constitutional: Alert in no apparent distress.  HENT: No signs of trauma, Bilateral external ears normal, Nose normal.   Eyes: Pupils are equal and reactive, Conjunctiva normal, Non-icteric.   Neck: Normal range of motion, No tenderness, Supple, No stridor.   Lymphatic: No lymphadenopathy noted.   Cardiovascular: Regular rate and rhythm, no murmurs.   Thorax & Lungs: Clear lungs, Normal breath sounds, No respiratory distress, No wheezing, No chest " tenderness.   Abdomen: Morbidly obese, Soft abdomen with mild supra pubic tenderness, supra pubic catheter site CDI Bowel sounds normal, Soft, No tenderness, No masses, No pulsatile masses. No peritoneal signs.  Skin: Warm, Dry, No erythema, No rash.   Back: No CVA tenderness.  Extremities: Intact distal pulses, No edema, No tenderness, No cyanosis.  Musculoskeletal: Good range of motion in all major joints. No tenderness to palpation or major deformities noted.   Neurologic: Alert , Normal motor function, Normal sensory function, No focal deficits noted.   Psychiatric: Affect normal, Judgment normal, Mood normal.     ,   DIAGNOSTIC STUDIES / PROCEDURES    LABS  Labs Reviewed   LACTIC ACID - Abnormal; Notable for the following:     Lactic Acid 3.1 (*)     All other components within normal limits   LACTIC ACID - Abnormal; Notable for the following:     Lactic Acid 2.1 (*)     All other components within normal limits   CBC WITH DIFFERENTIAL - Abnormal; Notable for the following:     WBC 13.9 (*)     Neutrophils (Absolute) 9.51 (*)     Monos (Absolute) 0.88 (*)     Immature Granulocytes (abs) 0.13 (*)     All other components within normal limits   COMP METABOLIC PANEL - Abnormal; Notable for the following:     Anion Gap 13.0 (*)     Glucose 145 (*)     AST(SGOT) 58 (*)     ALT(SGPT) 125 (*)     All other components within normal limits   URINALYSIS,CULTURE IF INDICATED - Abnormal; Notable for the following:     Character Cloudy (*)     Ph 8.5 (*)     Protein 100 (*)     Nitrite Positive (*)     Leukocyte Esterase Large (*)     All other components within normal limits   URINE MICROSCOPIC (W/UA) - Abnormal; Notable for the following:     WBC 5-10 (*)     RBC 20-50 (*)     Bacteria Many (*)     All other components within normal limits   URINE CULTURE(NEW) - Abnormal; Notable for the following:     Significant Indicator POS (*)     Urine Culture Mixed skin letty <10,000 cfu/mL (*)     Urine Culture   (*)     Value:  "Proteus species  >100,000 cfu/mL      All other components within normal limits   CBC WITH DIFFERENTIAL - Abnormal; Notable for the following:     RBC 3.70 (*)     Hemoglobin 11.0 (*)     Hematocrit 33.1 (*)     MCHC 32.3 (*)     Platelet Count 121 (*)     Lymphocytes 20.70 (*)     All other components within normal limits   BASIC METABOLIC PANEL - Abnormal; Notable for the following:     Glucose 167 (*)     Bun 7 (*)     All other components within normal limits   BLOOD CULTURE    Narrative:     Per Hospital Policy: Only change Specimen Src: to \"Line\" if  specified by physician order.   BLOOD CULTURE    Narrative:     Per Hospital Policy: Only change Specimen Src: to \"Line\" if  specified by physician order.   ESTIMATED GFR   PROTHROMBIN TIME    Narrative:     If not done within the last 4 hours  Indicate which anticoagulants the patient is on:->NONE   APTT    Narrative:     If not done within the last 4 hours  Indicate which anticoagulants the patient is on:->NONE   TSH    Narrative:     If not done within the last 4 hours  Indicate which anticoagulants the patient is on:->NONE   ESTIMATED GFR   LACTIC ACID   LACTIC ACID   BASIC METABOLIC PANEL     All labs reviewed by me.    RADIOLOGY  DX-CHEST-PORTABLE (1 VIEW)   Final Result      No evidence of acute cardiopulmonary process.      US-RENAL    (Results Pending)     The radiologist's interpretation of all radiological studies have been reviewed by me.    COURSE & MEDICAL DECISION MAKING  Nursing notes, VS, PMSFHx reviewed in chart.     3:58 PM Reviewed past medical records which indicate she was referred from Urgent Care to the ED for possible sepsis. Patient was tachycardic and had weakness, dizziness and lower right quadrant abdominal pain referred here for sepsis. Labs from earlier reviewed and show leukocytosis with left shift end increase immature granulocyte, anion gap, lactic acidosis, and strongly positive urinalysis.     4:07 PM Consult with pharmacy to " ask for antibiotic recommendations  based on previous resistance patterns.    4:18 PM Patient seen and examined at bedside. Differential diagnosis includes but is not limited to urosepsis , dehydration, vesicular fistula, recurrent UTI. Ordered for DX-chest, lactic acid, urine microscopic, estimated GFR, CBC, CMP, blood culture, urinalysis, and urine culture to evaluate. Patient will be treated with IV fluids, 2g Rocephin, 1800 mg in NS 250ml IVPB vancomycin  for her symptoms. Discussed earlier lab results with the patient and informed her that she will be admitted for further treatment.     4:14 PM - this patient's laboratory tests and presentation are concerning for urosepsis. As a complicated urinary tract with her indwelling catheter, patient is to be admitted for IV antibiotics, fluid resuscitation, and observation. I reviewed the patient's above findings and am paging Dr. Avendaño (Hospitalist) for admission.     4:22 PM - I discussed the patient's case and the above findings with Dr. Avendaño (Hospitalist) who agrees to admit patient.     DISPOSITION:  Patient will be admitted to Dr. Avendaño in guarded condition.       FINAL IMPRESSION  No diagnosis found.      DISPOSITION:  Patient will be admitted to Dr. Avendaño in guarded condition.        OUTPATIENT MEDICATIONS:  Current Discharge Medication List            FINAL IMPRESSION  1. Urosepsis    The note accurately reflects work and decisions made by me.  Emery Piña  3/10/2017  11:08 PM

## 2017-03-10 ENCOUNTER — HOME CARE VISIT (OUTPATIENT)
Dept: HOME HEALTH SERVICES | Facility: HOME HEALTHCARE | Age: 27
End: 2017-03-10
Payer: MEDICARE

## 2017-03-10 LAB
ANION GAP SERPL CALC-SCNC: 11 MMOL/L (ref 0–11.9)
BASOPHILS # BLD AUTO: 0.2 % (ref 0–1.8)
BASOPHILS # BLD: 0.02 K/UL (ref 0–0.12)
BUN SERPL-MCNC: 7 MG/DL (ref 8–22)
CALCIUM SERPL-MCNC: 8.9 MG/DL (ref 8.5–10.5)
CHLORIDE SERPL-SCNC: 109 MMOL/L (ref 96–112)
CO2 SERPL-SCNC: 20 MMOL/L (ref 20–33)
CREAT SERPL-MCNC: 0.51 MG/DL (ref 0.5–1.4)
EOSINOPHIL # BLD AUTO: 0.15 K/UL (ref 0–0.51)
EOSINOPHIL NFR BLD: 1.5 % (ref 0–6.9)
ERYTHROCYTE [DISTWIDTH] IN BLOOD BY AUTOMATED COUNT: 44 FL (ref 35.9–50)
GFR SERPL CREATININE-BSD FRML MDRD: >60 ML/MIN/1.73 M 2
GLUCOSE SERPL-MCNC: 167 MG/DL (ref 65–99)
HCT VFR BLD AUTO: 33.1 % (ref 37–47)
HGB BLD-MCNC: 11 G/DL (ref 12–16)
IMM GRANULOCYTES # BLD AUTO: 0.08 K/UL (ref 0–0.11)
IMM GRANULOCYTES NFR BLD AUTO: 0.8 % (ref 0–0.9)
LYMPHOCYTES # BLD AUTO: 2.05 K/UL (ref 1–4.8)
LYMPHOCYTES NFR BLD: 20.7 % (ref 22–41)
MCH RBC QN AUTO: 29.5 PG (ref 27–33)
MCHC RBC AUTO-ENTMCNC: 32.3 G/DL (ref 33.6–35)
MCV RBC AUTO: 91.1 FL (ref 81.4–97.8)
MONOCYTES # BLD AUTO: 0.75 K/UL (ref 0–0.85)
MONOCYTES NFR BLD AUTO: 7.6 % (ref 0–13.4)
NEUTROPHILS # BLD AUTO: 6.83 K/UL (ref 2–7.15)
NEUTROPHILS NFR BLD: 69.2 % (ref 44–72)
NRBC # BLD AUTO: 0 K/UL
NRBC BLD AUTO-RTO: 0 /100 WBC
PLATELET # BLD AUTO: 121 K/UL (ref 164–446)
PMV BLD AUTO: 11.3 FL (ref 9–12.9)
POTASSIUM SERPL-SCNC: 4.2 MMOL/L (ref 3.6–5.5)
RBC # BLD AUTO: 3.7 M/UL (ref 4.2–5.4)
SODIUM SERPL-SCNC: 140 MMOL/L (ref 135–145)
WBC # BLD AUTO: 9.9 K/UL (ref 4.8–10.8)

## 2017-03-10 PROCEDURE — 700102 HCHG RX REV CODE 250 W/ 637 OVERRIDE(OP): Performed by: HOSPITALIST

## 2017-03-10 PROCEDURE — 770020 HCHG ROOM/CARE - TELE (206)

## 2017-03-10 PROCEDURE — 665999 HH PPS REVENUE DEBIT

## 2017-03-10 PROCEDURE — 700102 HCHG RX REV CODE 250 W/ 637 OVERRIDE(OP): Performed by: INTERNAL MEDICINE

## 2017-03-10 PROCEDURE — 99233 SBSQ HOSP IP/OBS HIGH 50: CPT | Performed by: HOSPITALIST

## 2017-03-10 PROCEDURE — A9270 NON-COVERED ITEM OR SERVICE: HCPCS | Performed by: HOSPITALIST

## 2017-03-10 PROCEDURE — 36415 COLL VENOUS BLD VENIPUNCTURE: CPT

## 2017-03-10 PROCEDURE — 85025 COMPLETE CBC W/AUTO DIFF WBC: CPT

## 2017-03-10 PROCEDURE — 80048 BASIC METABOLIC PNL TOTAL CA: CPT

## 2017-03-10 PROCEDURE — 665998 HH PPS REVENUE CREDIT

## 2017-03-10 PROCEDURE — 700111 HCHG RX REV CODE 636 W/ 250 OVERRIDE (IP): Performed by: INTERNAL MEDICINE

## 2017-03-10 PROCEDURE — 700105 HCHG RX REV CODE 258: Performed by: INTERNAL MEDICINE

## 2017-03-10 PROCEDURE — 700105 HCHG RX REV CODE 258: Performed by: HOSPITALIST

## 2017-03-10 PROCEDURE — A9270 NON-COVERED ITEM OR SERVICE: HCPCS | Performed by: INTERNAL MEDICINE

## 2017-03-10 PROCEDURE — 94760 N-INVAS EAR/PLS OXIMETRY 1: CPT

## 2017-03-10 RX ORDER — FENTANYL 25 UG/1
PATCH TRANSDERMAL
Status: ACTIVE
Start: 2017-03-10 | End: 2017-03-11

## 2017-03-10 RX ORDER — FENTANYL 25 UG/1
1 PATCH TRANSDERMAL
Status: DISCONTINUED | OUTPATIENT
Start: 2017-03-10 | End: 2017-03-11 | Stop reason: HOSPADM

## 2017-03-10 RX ORDER — FENTANYL 25 UG/1
1 PATCH TRANSDERMAL ONCE
Status: DISCONTINUED | OUTPATIENT
Start: 2017-03-10 | End: 2017-03-11 | Stop reason: HOSPADM

## 2017-03-10 RX ORDER — IPRATROPIUM BROMIDE AND ALBUTEROL SULFATE 2.5; .5 MG/3ML; MG/3ML
3 SOLUTION RESPIRATORY (INHALATION)
Status: DISCONTINUED | OUTPATIENT
Start: 2017-03-10 | End: 2017-03-11 | Stop reason: HOSPADM

## 2017-03-10 RX ADMIN — FENTANYL 1 PATCH: 25 PATCH, EXTENDED RELEASE TRANSDERMAL at 13:45

## 2017-03-10 RX ADMIN — HEPARIN SODIUM 5000 UNITS: 5000 INJECTION, SOLUTION INTRAVENOUS; SUBCUTANEOUS at 13:40

## 2017-03-10 RX ADMIN — OXYCODONE HYDROCHLORIDE AND ACETAMINOPHEN 2 TABLET: 10; 325 TABLET ORAL at 22:15

## 2017-03-10 RX ADMIN — SODIUM CHLORIDE: 9 INJECTION, SOLUTION INTRAVENOUS at 12:54

## 2017-03-10 RX ADMIN — ATORVASTATIN CALCIUM 40 MG: 40 TABLET, FILM COATED ORAL at 20:14

## 2017-03-10 RX ADMIN — GABAPENTIN 1200 MG: 400 CAPSULE ORAL at 20:15

## 2017-03-10 RX ADMIN — OXYCODONE HYDROCHLORIDE AND ACETAMINOPHEN 2 TABLET: 10; 325 TABLET ORAL at 01:14

## 2017-03-10 RX ADMIN — OXYCODONE HYDROCHLORIDE AND ACETAMINOPHEN 2 TABLET: 10; 325 TABLET ORAL at 15:53

## 2017-03-10 RX ADMIN — FLUOXETINE 10 MG: 10 CAPSULE ORAL at 09:21

## 2017-03-10 RX ADMIN — ALBUTEROL SULFATE 2 PUFF: 90 AEROSOL, METERED RESPIRATORY (INHALATION) at 13:37

## 2017-03-10 RX ADMIN — NYSTATIN 1 APPLICATION: 100000 POWDER TOPICAL at 20:20

## 2017-03-10 RX ADMIN — CEFTRIAXONE 2 G: 2 INJECTION, POWDER, FOR SOLUTION INTRAMUSCULAR; INTRAVENOUS at 09:20

## 2017-03-10 RX ADMIN — SODIUM BICARBONATE TAB 650 MG 650 MG: 650 TAB at 15:52

## 2017-03-10 RX ADMIN — ASPIRIN 81 MG: 81 TABLET ORAL at 09:22

## 2017-03-10 RX ADMIN — SODIUM BICARBONATE TAB 650 MG 650 MG: 650 TAB at 20:16

## 2017-03-10 RX ADMIN — GABAPENTIN 600 MG: 300 CAPSULE ORAL at 09:20

## 2017-03-10 RX ADMIN — LEVOTHYROXINE SODIUM 75 MCG: 75 TABLET ORAL at 06:46

## 2017-03-10 RX ADMIN — OMEPRAZOLE 20 MG: 20 CAPSULE, DELAYED RELEASE ORAL at 20:16

## 2017-03-10 RX ADMIN — HEPARIN SODIUM 5000 UNITS: 5000 INJECTION, SOLUTION INTRAVENOUS; SUBCUTANEOUS at 06:45

## 2017-03-10 RX ADMIN — CYANOCOBALAMIN TAB 500 MCG 1000 MCG: 500 TAB at 20:15

## 2017-03-10 RX ADMIN — ZIPRASIDONE HYDROCHLORIDE 160 MG: 80 CAPSULE ORAL at 20:17

## 2017-03-10 RX ADMIN — OMEPRAZOLE 20 MG: 20 CAPSULE, DELAYED RELEASE ORAL at 09:21

## 2017-03-10 RX ADMIN — NYSTATIN 1500000 UNITS: 100000 POWDER TOPICAL at 15:52

## 2017-03-10 RX ADMIN — SODIUM CHLORIDE: 9 INJECTION, SOLUTION INTRAVENOUS at 22:16

## 2017-03-10 RX ADMIN — OXYCODONE HYDROCHLORIDE AND ACETAMINOPHEN 2 TABLET: 10; 325 TABLET ORAL at 09:27

## 2017-03-10 RX ADMIN — HEPARIN SODIUM 5000 UNITS: 5000 INJECTION, SOLUTION INTRAVENOUS; SUBCUTANEOUS at 22:18

## 2017-03-10 RX ADMIN — PROMETHAZINE HYDROCHLORIDE 25 MG: 25 TABLET ORAL at 01:14

## 2017-03-10 RX ADMIN — SODIUM BICARBONATE TAB 650 MG 650 MG: 650 TAB at 09:21

## 2017-03-10 RX ADMIN — CYANOCOBALAMIN TAB 500 MCG 1000 MCG: 500 TAB at 09:21

## 2017-03-10 RX ADMIN — ERGOCALCIFEROL 50000 UNITS: 1.25 CAPSULE ORAL at 09:21

## 2017-03-10 ASSESSMENT — PAIN SCALES - GENERAL
PAINLEVEL_OUTOF10: 0
PAINLEVEL_OUTOF10: 9
PAINLEVEL_OUTOF10: 7
PAINLEVEL_OUTOF10: 7
PAINLEVEL_OUTOF10: 8
PAINLEVEL_OUTOF10: 0
PAINLEVEL_OUTOF10: 0

## 2017-03-10 ASSESSMENT — ENCOUNTER SYMPTOMS
EYE DISCHARGE: 0
NECK PAIN: 0
DEPRESSION: 0
COUGH: 0
VOMITING: 0
HEADACHES: 0
CLAUDICATION: 0
MENTAL STATUS CHANGE: 1
LOSS OF CONSCIOUSNESS: 0
MYALGIAS: 0
CONSTIPATION: 0
PALPITATIONS: 0
DIARRHEA: 0
FOCAL WEAKNESS: 0
NAUSEA: 0
CHILLS: 0
FEVER: 0
WEAKNESS: 0
BRUISES/BLEEDS EASILY: 0
BACK PAIN: 0
HEMOPTYSIS: 0
SPUTUM PRODUCTION: 0
SENSORY CHANGE: 0
FLANK PAIN: 1
EYE PAIN: 0
SPEECH CHANGE: 0
SHORTNESS OF BREATH: 0
DIAPHORESIS: 0
WHEEZING: 0
SORE THROAT: 0
DIZZINESS: 0
ABDOMINAL PAIN: 0

## 2017-03-10 ASSESSMENT — COPD QUESTIONNAIRES
HAVE YOU SMOKED AT LEAST 100 CIGARETTES IN YOUR ENTIRE LIFE: NO/DON'T KNOW
COPD SCREENING SCORE: 5
DURING THE PAST 4 WEEKS HOW MUCH DID YOU FEEL SHORT OF BREATH: MOST  OR ALL OF THE TIME
DO YOU EVER COUGH UP ANY MUCUS OR PHLEGM?: YES, EVERY DAY

## 2017-03-10 ASSESSMENT — LIFESTYLE VARIABLES
EVER_SMOKED: NEVER
SUBSTANCE_ABUSE: 0

## 2017-03-10 NOTE — H&P
ADMITTING ATTENDING:  Sreekanth Martínez MD    PRIMARY CARE PHYSICIAN:  Torres Brody MD    CHIEF COMPLAINT:  Fever, chills, and foul-smelling urine.    HISTORY OF PRESENT ILLNESS:  This is a 26-year-old female with a past medical   history of scoliosis, neurogenic bladder with suprapubic catheterization,   fibromyalgia, chronic pain syndrome, recurrent paraparesis and having paresis   of unknown etiology, who presents with complaints of suprapubic pain,   foul-smelling urine, fever, and chills for the past 2 days.  Patient was noted   a foul-smelling odor from her urine, which appears to be dirty and purulent   appearing.  She also complains of dysuria.  She states she has been feeling   generalized weakness, subjective fevers, chills, nausea, vomiting, and some   confusion for the past 2 days.  Patient states she had her suprapubic catheter   replaced by home health last week.  Patient denies chest pain, cough,   diarrhea, or muscle weakness.  Patient does note some shortness of breath.  In   the ER, patient's white count was elevated at 13.9.  Her urine was positive   for infection and her lactic acid was 3.1.    REVIEW OF SYSTEMS:  Please see HPI, all other systems were reviewed and are   negative.    PAST MEDICAL HISTORY:  Scoliosis, multiple personality disorder, recurrent   UTIs, heartburn, hypertension, obesity, neurogenic bladder with suprapubic   catheter placement, sleep apnea, and recurrent paraparesis of unknown   etiology.    PAST SURGICAL HISTORY:  Laparoscopic cholecystectomy, lumbar fusion,   tonsillectomy, and bowel stimulator insertion.    MEDICATION ALLERGIES:  CEFDINIR, DEPAKOTE, ABILIFY, AMITRIPTYLINE,   AMOXICILLIN, CIPROFLOXACIN, CLINDAMYCIN, DOXYCYCLINE, FLAGYL, FLOMAX,   METFORMIN, SULFA DRUGS, TAPE, VANCOMYCIN, KEFLEX, AND LEVOFLOXACIN.    FAMILY HISTORY:  Hypertension, sleep apnea, and heart disease in the mother.    SOCIAL HISTORY:  Patient denies the use of tobacco, alcohol, or illicit  drugs.    CURRENT OUTPATIENT MEDICATIONS:  Macrobid 100 mg b.i.d., patient started a   7-day course on February 27th, melatonin 5 mg 1 daily as needed for sleep,   Corlanor 5 mg twice a day with meals, Lasix 20 mg daily, lactulose 10 mg by   mouth b.i.d., Flexeril 10 mg b.i.d., Neurontin 600 mg in the a.m. and 1200 mg   in the p.m., Lipitor 40 mg daily, aspirin 81 mg daily, Geodon 160 mg every   evening, fentanyl patch every 72 hours, vitamin D 50,000 units every Friday,   Prozac 10 mg every day, Percocet 10/325 mg one to two tabs every 6 hours,   Phenergan 25 mg every 6 hours for vomiting, albuterol 2 puffs every 6 hours   for shortness of breath, sodium bicarbonate 325 mg three times a day,   Synthroid 75 mcg every morning, omeprazole 20 mg b.i.d., and Zantac 150 mg   b.i.d.    PHYSICAL EXAMINATION:  VITAL SIGNS:  BMI 49, blood pressure 102/61, pulse 84, temperature 97.2, and   respiratory rate 18.  GENERAL:  Obese female, in no acute distress.  HEENT:  Atraumatic head.  PERRLA.  Normal-appearing sclerae and conjunctivae.    Mucus membranes dry.  No pharyngeal exudate.  NECK:  Supple without lymphadenopathy.  RESPIRATORY:  Normal effort.  Clear to auscultation bilaterally.  No wheezes   or crackles.  CARDIOVASCULAR:  Regular rate and rhythm.  No murmurs audible.  GASTROINTESTINAL:  Abdomen soft, nontender, and nondistended.  Patient has   suprapubic tenderness to palpation.  EXTREMITIES:  Pulses intact.  No cyanosis or edema noted.  NEUROLOGIC:  Cranial nerves II-XII intact.  No focal deficits.  Strength and   sensation intact bilaterally.  PSYCHIATRIC:  Alert and oriented to time, place, and person.  SKIN:  No rashes or ulcers seen.  Capillary refill less than 2 seconds.    PERTINENT LABORATORY DATA REVIEW:  Lactic acid 3.1.  WBC 13.9, hemoglobin   14.2, and platelet count 172.  Sodium 138, potassium 3.9, chloride 105,   bicarbonate 20, anion gap 13, glucose 145, BUN 10, creatinine 0.57, and   calcium 10.4.  AST  58, , alkaline phosphatase 76, total bili 0.9, and   albumin 4.9.  INR 0.92.  TSH 2.6.  Urinalysis reveals large leukocyte   esterase, 5-10 wbc's, and many bacteria.    IMAGING:  Chest x-ray reveals no acute cardiopulmonary process.    ASSESSMENT AND PLAN:  1.  Sepsis -- likely source urinary tract infection.  Patient will be admitted   to the telemetry unit with close cardiac monitoring.  Patient has been   started on IV fluids per septic protocol.  We will continue to monitor her   lactate and bolus per protocol.  We have sent for blood cultures and urine   cultures.  Patient has been started on IV ceftriaxone.  We will continue to   follow her urine cultures.  We will consider consulting urology tomorrow for   placement of suprapubic catheter.  2.  Urinary tract infection -- as above.  3.  Hypothyroidism.  We will check a TSH.  Continue Synthroid 75 mcg daily.  4.  Gastroesophageal reflux disease, stable.  Continue omeprazole 20 mg b.i.d.  5.  Hyperlipidemia.  Continue Lipitor 40 mg daily.  6.  Chronic pain and neuropathy.  Continue patient on her home regimen of   Percocet, Neurontin, and fentanyl patch.  7.  Prophylaxis:  Heparin subcutaneously for deep venous thrombosis   prophylaxis.  8.  Code status:  Full.    The patient will be admitted to the inpatient service for management that is   expected to take greater than 2 midnights.       ____________________________________     MD RICKY Damian / CHRISTELLE    DD:  03/10/2017 00:12:03  DT:  03/10/2017 01:11:53    D#:  [id number]  Job#:  [id number]
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
## 2017-03-10 NOTE — CARE PLAN
Problem: Communication  Goal: The ability to communicate needs accurately and effectively will improve  Discussed POC, all questions answered.   Intervention: [place] patient and significant other/support system to call light to alert staff of needs  done  Intervention: Reorient patient to environment as needed  done      Problem: Safety  Goal: Will remain free from falls  Educated on fall risks and safe amblaltion. All needs met.

## 2017-03-10 NOTE — PROGRESS NOTES
Hospital Medicine Progress Note, Adult, Complex               Author: Madhavirose Carpenterron Date & Time created: 3/10/2017  8:50 AM     Interval History:  CC;  Fever/chills/foul smelling urine  3/9:  Admitted 26yoF with prior afib s/p cardiac ablation 1 year ago,  neurogenic bladder, fibromyalgia, chronic pain syndrome and placement of suprapubic catheter presented with abd pain and foul smelling urine.    3/10:  UC with proteus, currently on Rocephin IV, improved lactic acid and wbc, less pain, clear urine in Andrade catheter.  Feeling better, sitting up, eating, good appetite.    Review of Systems:  Review of Systems   Constitutional: Negative for fever, chills, malaise/fatigue and diaphoresis.   HENT: Negative for congestion and sore throat.    Eyes: Negative for pain and discharge.   Respiratory: Negative for cough, hemoptysis, sputum production, shortness of breath and wheezing.    Cardiovascular: Negative for chest pain, palpitations, claudication and leg swelling.   Gastrointestinal: Negative for nausea, vomiting, abdominal pain, diarrhea, constipation and melena.   Genitourinary: Positive for dysuria, frequency and flank pain (left sided). Negative for urgency.   Musculoskeletal: Negative for myalgias, back pain, joint pain and neck pain.   Skin: Negative for itching and rash.   Neurological: Negative for dizziness, sensory change, speech change, focal weakness, loss of consciousness, weakness and headaches.   Endo/Heme/Allergies: Does not bruise/bleed easily.   Psychiatric/Behavioral: Negative for depression, suicidal ideas and substance abuse.       Physical Exam:  Physical Exam   Constitutional: She is oriented to person, place, and time. She appears well-developed and well-nourished. No distress.   HENT:   Head: Normocephalic and atraumatic.   Nose: Nose normal.   Mouth/Throat: Oropharynx is clear and moist. No oropharyngeal exudate.   Eyes: Conjunctivae and EOM are normal. Pupils are equal, round, and reactive  to light. Right eye exhibits no discharge. Left eye exhibits no discharge. No scleral icterus.   Neck: Normal range of motion. Neck supple. No JVD present. No tracheal deviation present. No thyromegaly present.   Cardiovascular: Normal rate, regular rhythm, normal heart sounds and intact distal pulses.  Exam reveals no gallop and no friction rub.    No murmur heard.  Pulmonary/Chest: Effort normal and breath sounds normal. No stridor. No respiratory distress. She has no wheezes. She has no rales. She exhibits no tenderness.   Abdominal: Soft. Bowel sounds are normal. She exhibits no distension and no mass. There is no tenderness. There is no rebound and no guarding.   Musculoskeletal: Normal range of motion. She exhibits no edema or tenderness.   Lymphadenopathy:     She has no cervical adenopathy.   Neurological: She is alert and oriented to person, place, and time. No cranial nerve deficit. She exhibits normal muscle tone. Coordination normal.   Skin: Skin is warm and dry. No rash noted. She is not diaphoretic. No erythema.   Psychiatric: She has a normal mood and affect. Her behavior is normal. Judgment and thought content normal.   Nursing note and vitals reviewed.      Labs:        Invalid input(s): [id number]      Recent Labs      03/09/17   1348  03/10/17   0349   SODIUM  138  140   POTASSIUM  3.9  4.2   CHLORIDE  105  109   CO2  20  20   BUN  10  7*   CREATININE  0.57  0.51   CALCIUM  10.4  8.9     Recent Labs      03/09/17   1348  03/10/17   0349   ALTSGPT  125*   --    ASTSGOT  58*   --    ALKPHOSPHAT  76   --    TBILIRUBIN  0.9   --    GLUCOSE  145*  167*     Recent Labs      03/09/17   1348  03/10/17   0349   RBC  4.69  3.70*   HEMOGLOBIN  14.2  11.0*   HEMATOCRIT  41.9  33.1*   PLATELETCT  172  121*   PROTHROMBTM  12.6   --    APTT  26.0   --    INR  0.92   --      Recent Labs      03/09/17   1348  03/10/17   0349   WBC  13.9*  9.9   NEUTSPOLYS  68.30  69.20   LYMPHOCYTES  22.70  20.70*   MONOCYTES   6.30  7.60   EOSINOPHILS  1.30  1.50   BASOPHILS  0.50  0.20   ASTSGOT  58*   --    ALTSGPT  125*   --    ALKPHOSPHAT  76   --    TBILIRUBIN  0.9   --            Hemodynamics:  Temp (24hrs), Av.5 °C (97.7 °F), Min:35.9 °C (96.6 °F), Max:37.1 °C (98.7 °F)  Temperature: 37.1 °C (98.7 °F)  Pulse  Av  Min: 76  Max: 105Heart Rate (Monitored): 88  Blood Pressure: 123/71 mmHg, NIBP: 119/70 mmHg     Respiratory:    Respiration: 16, Pulse Oximetry: 93 %        RUL Breath Sounds: Diminished, RML Breath Sounds: Diminished, RLL Breath Sounds: Diminished, MARY Breath Sounds: Diminished, LLL Breath Sounds: Diminished  Fluids:    Intake/Output Summary (Last 24 hours) at 03/10/17 0850  Last data filed at 03/10/17 0600   Gross per 24 hour   Intake      0 ml   Output   1400 ml   Net  -1400 ml     Weight: 122.8 kg (270 lb 11.6 oz)  GI/Nutrition:  Orders Placed This Encounter   Procedures   • Diet Order     Standing Status: Standing      Number of Occurrences: 1      Standing Expiration Date:      Order Specific Question:  Diet:     Answer:  Regular [1]     Medical Decision Making, by Problem:  Active Hospital Problems    Diagnosis   • UTI (urinary tract infection) [N39.0]  UC proteus  Continue Rocephin IV   suprapubic catheter:  H/o neurogenic bladder  Change out catheter 3/10.  Prior UTI 17 enterococcus faecalis     • Sepsis (CMS-HCC) [A41.9]  Lactic acid decreased  Repeat in a.m.  Source:  UTI  No hypotension or AMS  BCs - x2  Continue IVFs NS, decreased to 75/hr    H/o afib:  S/p ablation 1 year ago   tele monitor:  NSR  Asa  lipitor    chronic pain syndrome:    Gabapentin   Oxycodone prn           FC, ambulatory.  Check for home O2, on 2 lpm nc.    Andrade catheter: Neurogenic Bladder      DVT Prophylaxis: Heparin      Antibiotics: Treating active infection/contamination beyond 24 hours perioperative coverage  Assessed for rehab: Patient returned to prior level of function, rehabilitation not indicated at this  time

## 2017-03-10 NOTE — ED NOTES
Report given to Lety HASSAN who is assuming care of [id number] where this pt. Was transferred to from the ED.

## 2017-03-10 NOTE — PROGRESS NOTES
Report received, assumed pt care, assessment complete. VSS, A&O X4, no complaints of pain at this time, still sleeping, turns self. Discussed POC, pt verbalizes understanding. No further concerns at this time. Will continue to monitor. Bed in low position, bed alarm on, call light in reach.

## 2017-03-10 NOTE — ED NOTES
Pt transferred to [id number], Lety HASSAN aware of pt's pending arrival, all belongings accounted for.

## 2017-03-10 NOTE — PROGRESS NOTES
Received by transfer from ER per stretcher. Drowsy but arouses easily to verbal stimuli. Denies c/o pain but c/o fatigue. Iv in RT hand infusing NS bolus and vancomycin.

## 2017-03-10 NOTE — ED NOTES
Med rec complete per Pt at bedside  Allergies reviewed  Pt completed a 7 day course of Macrobid on 3/6

## 2017-03-10 NOTE — PROGRESS NOTES
RESTED WELL SINCE ADMIT. TELE NSR. RESP EASY WITH 02 2L IN PLACE.HAS C/O CHRONIC PAIN X1 AND RECEIVED NORCO AND PHENERGAN FOR C/O. IV INFUSING NS @100 IN RT BREAST.

## 2017-03-11 ENCOUNTER — APPOINTMENT (OUTPATIENT)
Dept: RADIOLOGY | Facility: MEDICAL CENTER | Age: 27
DRG: 698 | End: 2017-03-11
Attending: HOSPITALIST
Payer: MEDICARE

## 2017-03-11 VITALS
DIASTOLIC BLOOD PRESSURE: 71 MMHG | TEMPERATURE: 97.3 F | RESPIRATION RATE: 18 BRPM | WEIGHT: 276.46 LBS | BODY MASS INDEX: 50.55 KG/M2 | OXYGEN SATURATION: 96 % | SYSTOLIC BLOOD PRESSURE: 138 MMHG | HEART RATE: 104 BPM

## 2017-03-11 LAB
ANION GAP SERPL CALC-SCNC: 8 MMOL/L (ref 0–11.9)
BACTERIA UR CULT: ABNORMAL
BACTERIA UR CULT: ABNORMAL
BUN SERPL-MCNC: 6 MG/DL (ref 8–22)
CALCIUM SERPL-MCNC: 9.1 MG/DL (ref 8.5–10.5)
CHLORIDE SERPL-SCNC: 109 MMOL/L (ref 96–112)
CO2 SERPL-SCNC: 23 MMOL/L (ref 20–33)
CREAT SERPL-MCNC: 0.55 MG/DL (ref 0.5–1.4)
GFR SERPL CREATININE-BSD FRML MDRD: >60 ML/MIN/1.73 M 2
GLUCOSE SERPL-MCNC: 167 MG/DL (ref 65–99)
LACTATE BLD-SCNC: 1.7 MMOL/L (ref 0.5–2)
POTASSIUM SERPL-SCNC: 3.9 MMOL/L (ref 3.6–5.5)
SIGNIFICANT IND 70042: ABNORMAL
SITE SITE: ABNORMAL
SODIUM SERPL-SCNC: 140 MMOL/L (ref 135–145)
SOURCE SOURCE: ABNORMAL

## 2017-03-11 PROCEDURE — A9270 NON-COVERED ITEM OR SERVICE: HCPCS | Performed by: INTERNAL MEDICINE

## 2017-03-11 PROCEDURE — 700102 HCHG RX REV CODE 250 W/ 637 OVERRIDE(OP): Performed by: INTERNAL MEDICINE

## 2017-03-11 PROCEDURE — 665998 HH PPS REVENUE CREDIT

## 2017-03-11 PROCEDURE — 99239 HOSP IP/OBS DSCHRG MGMT >30: CPT | Performed by: HOSPITALIST

## 2017-03-11 PROCEDURE — 80048 BASIC METABOLIC PNL TOTAL CA: CPT

## 2017-03-11 PROCEDURE — 76775 US EXAM ABDO BACK WALL LIM: CPT

## 2017-03-11 PROCEDURE — 665999 HH PPS REVENUE DEBIT

## 2017-03-11 PROCEDURE — 36415 COLL VENOUS BLD VENIPUNCTURE: CPT

## 2017-03-11 PROCEDURE — 83605 ASSAY OF LACTIC ACID: CPT

## 2017-03-11 PROCEDURE — 700111 HCHG RX REV CODE 636 W/ 250 OVERRIDE (IP): Performed by: INTERNAL MEDICINE

## 2017-03-11 PROCEDURE — 700105 HCHG RX REV CODE 258: Performed by: INTERNAL MEDICINE

## 2017-03-11 RX ORDER — CEFDINIR 300 MG/1
300 CAPSULE ORAL 2 TIMES DAILY
Qty: 14 CAP | Refills: 0 | Status: ON HOLD | OUTPATIENT
Start: 2017-03-11 | End: 2017-03-19

## 2017-03-11 RX ADMIN — ASPIRIN 81 MG: 81 TABLET ORAL at 07:58

## 2017-03-11 RX ADMIN — HEPARIN SODIUM 5000 UNITS: 5000 INJECTION, SOLUTION INTRAVENOUS; SUBCUTANEOUS at 06:08

## 2017-03-11 RX ADMIN — HEPARIN SODIUM 5000 UNITS: 5000 INJECTION, SOLUTION INTRAVENOUS; SUBCUTANEOUS at 12:43

## 2017-03-11 RX ADMIN — PROMETHAZINE HYDROCHLORIDE 25 MG: 25 TABLET ORAL at 13:50

## 2017-03-11 RX ADMIN — OMEPRAZOLE 20 MG: 20 CAPSULE, DELAYED RELEASE ORAL at 07:58

## 2017-03-11 RX ADMIN — FLUOXETINE 10 MG: 10 CAPSULE ORAL at 07:58

## 2017-03-11 RX ADMIN — LEVOTHYROXINE SODIUM 75 MCG: 75 TABLET ORAL at 06:08

## 2017-03-11 RX ADMIN — OXYCODONE HYDROCHLORIDE AND ACETAMINOPHEN 2 TABLET: 10; 325 TABLET ORAL at 06:07

## 2017-03-11 RX ADMIN — GABAPENTIN 600 MG: 300 CAPSULE ORAL at 07:58

## 2017-03-11 RX ADMIN — NYSTATIN 1 APPLICATION: 100000 POWDER TOPICAL at 09:00

## 2017-03-11 RX ADMIN — OXYCODONE HYDROCHLORIDE AND ACETAMINOPHEN 2 TABLET: 10; 325 TABLET ORAL at 12:43

## 2017-03-11 RX ADMIN — SODIUM BICARBONATE TAB 650 MG 650 MG: 650 TAB at 07:58

## 2017-03-11 RX ADMIN — CEFTRIAXONE 2 G: 2 INJECTION, POWDER, FOR SOLUTION INTRAMUSCULAR; INTRAVENOUS at 07:57

## 2017-03-11 RX ADMIN — CYANOCOBALAMIN TAB 500 MCG 1000 MCG: 500 TAB at 07:58

## 2017-03-11 ASSESSMENT — PAIN SCALES - GENERAL
PAINLEVEL_OUTOF10: 3
PAINLEVEL_OUTOF10: 6

## 2017-03-11 NOTE — PROGRESS NOTES
Patient given discharge information, prescriptions, and follow-up care instructions. Patient voices no questions or concerns regarding discharge instructions, and has all appropriate equipment necessary. IV and tele monitor removed. Patient transported to car via wheelchair.

## 2017-03-11 NOTE — DISCHARGE PLANNING
Patient is currently on service with [facility]. Please write home care orders upon discharge to home for continuum of care.[facility] at  /[phone] if there are any questions.

## 2017-03-11 NOTE — PROGRESS NOTES
Received report from nightshift RN, assumed care of patient. Patient is A&O x 4, bed alarm on. Patient educated on importance of calling if in need of assistance. Verbalizes understanding. Patient declines pain at this time. Patient updated on plan of care, voices no concerns at this time. Will continue to monitor for safety and comfort.

## 2017-03-11 NOTE — PROGRESS NOTES
Suprapubic catheter changed out using aseptic technique. 40 ml return of clear non-malodorous urine. No difficulties with insertion.

## 2017-03-11 NOTE — DISCHARGE PLANNING
RN notified KRYSTIAN that pt will be ready for dc today and was on service with [facility]. RN spoke to physician regarding resume HH orders. KRYSTIAN met with pt at bedside to discuss HH. Pt would like to use [facility] services again and signed choice form for [facility]. Pt declined a copy of the choice form. Choice form faxed to [facility]. Awaiting HH orders.

## 2017-03-11 NOTE — DISCHARGE SUMMARY
UROLOGIST:  Dr. Montanez.    PRIMARY CARE PROVIDER:  Dr. Torres Brody.    CODE STATUS:  Full code status.    The patient was set up to resume home health, PT, OT, nursing, suprapubic   catheter care.    ALLERGIES:  CEFDINIR, DEPAKOTE, ABILIFY, AMITRIPTYLINE, AMOXICILLIN, CIPRO,   CLINDAMYCIN, DOXYCYCLINE, ERYTHROMYCIN, FLAGYL, FLOMAX, METFORMIN, SULFA,   TAPE, VANCOMYCIN, CEPHALEXIN, AND LEVOFLOXACIN.    DISCHARGE MEDICATIONS:  Omnicef 300 mg 2 times daily for 7 days to complete   antibiotic course, received 2 days of Rocephin IV, Cranberry 1000 mg capsule 2   times daily with meals, nystatin powder 1 application 3 times daily.    Continue albuterol 2 puffs every 6 hours, aspirin enteric coated 81 mg daily,   Lipitor 40 mg at bedtime, Corlanor 5 mg 2 times daily with meals, Flexeril 10   mg 2 times daily, fentanyl patch 25 mcg 1 patch every 72 hours, Prozac 10 mg   p.o. daily, Lasix 20 mg daily, gabapentin 600 mg a.m. and 2 mg p.m., vitamin   B12 1000 mcg 2 times daily, lactulose 10 g 2 times daily, levothyroxine 75 mcg   p.o. daily, melatonin 5 mg at bedtime, may repeat x1; omeprazole 20 mg 2   times daily, oxycodone/acetaminophen 10/325 two tablets every 6 hours for   breakthrough pain, Phenergan 25 mg every 6 hours, ranitidine 150 mg 2 times   daily, sodium bicarbonate 650 mg 2 times daily, vitamin D 50,000 units weekly,   Geodon 160 mg daily.  She is to stop taking Macrobid, her urine was Proteus,   was resistant to Macrobid.    DISCHARGE DIAGNOSES:  1.  Acute Proteus urinary tract infection, pansensitive except for Macrobid,   improvement with Rocephin, did exchange suprapubic catheter on 03/11/2017.    She states the last infection, she did not get her suprapubic catheter   exchanged.  Her prior urinary tract infection 02/22/2017 had Enterococcus   faecalis.  2.  Sepsis, resolved with lactic acidosis, unclear etiology, but improved with   IV fluids.  3.  History of atrial fibrillation, status post ablation 1 year  ago, currently   normal sinus rhythm.  Continue with aspirin and Lipitor.  4.  Chronic pain syndrome.  She is on fentanyl patch, gabapentin, oxycodone   p.r.n.  5.  Neurogenic bladder with suprapubic catheter placement followed by Dr. Montanez secondary to paraplegia of unknown etiology.  6.  Schizophrenia.    HOSPITAL COURSE:  This 26-year-old female with atrial fibrillation history,   status post ablation, in normal sinus rhythm now, neurogenic bladder with   suprapubic catheter placement for unknown paraplegia and neurogenic bladder,   chronic pain syndrome, presented with foul smelling urine, fever.  She was   found to have a lactic acid elevation at 3.2, started on sepsis protocol.  She   did not have hypotension or altered mental status.  She did have a white   count of 13.9.  She was started with Rocephin.  Urine culture did grow   Proteus, which was resistant to Macrobid, but pansensitive to all else due to   her multiple allergies, kept her on Omnicef, which is similar to Rocephin,   which she tolerated well.  She continued to clear.  We did exchange out her   suprapubic Andrade catheter prior to discharge and resumed her home health.  She   does have home oxygen 2 L per minute nasal cannula as well as home CPAP,   which she uses bedtime.  She is ambulatory.  I did place the patient on   cranberry as a preventative for UTIs _____ continue with nystatin powder.  At   time of discharge, her lactic acid was normal at 1.7.  She was eating well.    She did not require any further IV fluids.  Renal ultrasound did not show any   obstruction.  Totally normal kidneys seen.    Discharge time 35 minutes.       ____________________________________     MD FERNANDO Caicedo / CHRISTELLE    DD:  03/11/2017 14:18:50  DT:  03/11/2017 14:47:19    D#:  [id number]  Job#:  [id number]

## 2017-03-11 NOTE — FACE TO FACE
Face to Face Supporting Documentation - Home Health    The encounter with this patient was in whole or in part the primary reason for home health admission.    Date of encounter:   Patient:                    MRN:                       YOB: 2018  Kristin Balderrama  [id number]  1990     Home health to see patient for:  Skilled Nursing care for assessment, interventions & education, Physical Therapy evaluation and treatment and Occupational therapy evaluation and treatment    Skilled need for:  Exacerbation of Chronic Disease State paresis, neurogenic bladder with UTI    Skilled nursing interventions to include:  Comment: home safety    Homebound status evidenced by:  Needs the assistance of another person in order to leave the home. Leaving home requires a considerable and taxing effort. There is a normal inability to leave the home.    Community Physician to provide follow up care: Torres Brody M.D.     Optional Interventions? No      I certify the face to face encounter for this home health care referral meets the CMS requirements and the encounter/clinical assessment with the patient was, in whole, or in part, for the medical condition(s) listed above, which is the primary reason for home health care. Based on my clinical findings: the service(s) are medically necessary, support the need for home health care, and the homebound criteria are met.  I certify that this patient has had a face to face encounter by myself.  Madhavi Stephens M.D. - NPI: [id number]

## 2017-03-11 NOTE — DISCHARGE INSTRUCTIONS
Discharge Instructions    Discharged to home by car with friend. Discharged via wheelchair, hospital escort: Yes.  Special equipment needed: Not Applicable    Be sure to schedule a follow-up appointment with your primary care doctor or any specialists as instructed.     Discharge Plan:   Diet Plan: Discussed  Activity Level: Discussed  Confirmed Follow up Appointment: Patient to Call and Schedule Appointment  Confirmed Symptoms Management: Discussed  Medication Reconciliation Updated: Yes  Influenza Vaccine Indication: Not indicated: Previously immunized this influenza season and > 8 years of age    I understand that a diet low in cholesterol, fat, and sodium is recommended for good health. Unless I have been given specific instructions below for another diet, I accept this instruction as my diet prescription.   Other diet: Low sodium, low fat, well-balanced diet    Special Instructions: None    · Is patient discharged on Warfarin / Coumadin?   No     · Is patient Post Blood Transfusion?  No    Depression / Suicide Risk    As you are discharged from this [facility] facility, it is important to learn how to keep safe from harming yourself.    Recognize the warning signs:  · Abrupt changes in personality, positive or negative- including increase in energy   · Giving away possessions  · Change in eating patterns- significant weight changes-  positive or negative  · Change in sleeping patterns- unable to sleep or sleeping all the time   · Unwillingness or inability to communicate  · Depression  · Unusual sadness, discouragement and loneliness  · Talk of wanting to die  · Neglect of personal appearance   · Rebelliousness- reckless behavior  · Withdrawal from people/activities they love  · Confusion- inability to concentrate     If you or a loved one observes any of these behaviors or has concerns about self-harm, here's what you can do:  · Talk about it- your feelings and reasons for harming yourself  · Remove any  means that you might use to hurt yourself (examples: pills, rope, extension cords, firearm)  · Get professional help from the community (Mental Health, Substance Abuse, psychological counseling)  · Do not be alone:Call your Safe Contact- someone whom you trust who will be there for you.  · Call your local CRISIS HOTLINE 770-6814 or 486-546-2128  · Call your local Children's Mobile Crisis Response Team [place] (287) 528-5178 or wwwSalespush.com  · Call the toll free National Suicide Prevention Hotlines   · National Suicide Prevention Lifeline 897-352-DZDM (5013)  · GI Track Hope Line Network 800-SUICIDE (260-2155)    Urinary Tract Infection  Urinary tract infections (UTIs) can develop anywhere along your urinary tract. Your urinary tract is your body's drainage system for removing wastes and extra water. Your urinary tract includes two kidneys, two ureters, a bladder, and a urethra. Your kidneys are a pair of bean-shaped organs. Each kidney is about the size of your fist. They are located below your ribs, one on each side of your spine.  CAUSES  Infections are caused by microbes, which are microscopic organisms, including fungi, viruses, and bacteria. These organisms are so small that they can only be seen through a microscope. Bacteria are the microbes that most commonly cause UTIs.  SYMPTOMS   Symptoms of UTIs may vary by age and gender of the patient and by the location of the infection. Symptoms in young women typically include a frequent and intense urge to urinate and a painful, burning feeling in the bladder or urethra during urination. Older women and men are more likely to be tired, shaky, and weak and have muscle aches and abdominal pain. A fever may mean the infection is in your kidneys. Other symptoms of a kidney infection include pain in your back or sides below the ribs, nausea, and vomiting.  DIAGNOSIS  To diagnose a UTI, your caregiver will ask you about your symptoms. Your caregiver also  will ask to provide a urine sample. The urine sample will be tested for bacteria and white blood cells. White blood cells are made by your body to help fight infection.  TREATMENT   Typically, UTIs can be treated with medication. Because most UTIs are caused by a bacterial infection, they usually can be treated with the use of antibiotics. The choice of antibiotic and length of treatment depend on your symptoms and the type of bacteria causing your infection.  HOME CARE INSTRUCTIONS  · If you were prescribed antibiotics, take them exactly as your caregiver instructs you. Finish the medication even if you feel better after you have only taken some of the medication.  · Drink enough water and fluids to keep your urine clear or pale yellow.  · Avoid caffeine, tea, and carbonated beverages. They tend to irritate your bladder.  · Empty your bladder often. Avoid holding urine for long periods of time.  · Empty your bladder before and after sexual intercourse.  · After a bowel movement, women should cleanse from front to back. Use each tissue only once.  SEEK MEDICAL CARE IF:   · You have back pain.  · You develop a fever.  · Your symptoms do not begin to resolve within 3 days.  SEEK IMMEDIATE MEDICAL CARE IF:   · You have severe back pain or lower abdominal pain.  · You develop chills.  · You have nausea or vomiting.  · You have continued burning or discomfort with urination.  MAKE SURE YOU:   · Understand these instructions.  · Will watch your condition.  · Will get help right away if you are not doing well or get worse.     This information is not intended to replace advice given to you by your health care provider. Make sure you discuss any questions you have with your health care provider.     Document Released: 09/27/2006 Document Revised: 01/08/2016 Document Reviewed: 01/25/2013  [employer] Interactive Patient Education ©2016 [employer].    Cefdinir capsules  What is this medicine?  CEFDINIR (SEF di ner) is a  cephalosporin antibiotic. It is used to treat certain kinds of bacterial infections. It will not work for colds, flu, or other viral infections.  This medicine may be used for other purposes; ask your health care provider or pharmacist if you have questions.  COMMON BRAND NAME(S): Zoë  What should I tell my health care provider before I take this medicine?  They need to know if you have any of these conditions:  -bleeding problems  -kidney disease  -stomach or intestine problems (especially colitis)  -an unusual or allergic reaction to cefdinir, other cephalosporin antibiotics, penicillin, penicillamine, other foods, dyes or preservatives  -pregnant or trying to get pregnant  -breast-feeding  How should I use this medicine?  Take this medicine by mouth. Swallow it with a drink of water. Follow the directions on the prescription label. You can take it with or without food. If it upsets your stomach it may help to take it with food. Take your doses at regular intervals. Do not take it more often than directed. Finish all the medicine you are prescribed even if you think your infection is better.  Talk to your pediatrician regarding the use of this medicine in children. Special care may be needed.  Overdosage: If you think you have taken too much of this medicine contact a poison control center or emergency room at once.  NOTE: This medicine is only for you. Do not share this medicine with others.  What if I miss a dose?  If you miss a dose, take it as soon as you can. If it is almost time for your next dose, take only that dose. Do not take double or extra doses.  What may interact with this medicine?  -antacids that contain aluminum or magnesium  -iron supplements  -other antibiotics  -probenecid  This list may not describe all possible interactions. Give your health care provider a list of all the medicines, herbs, non-prescription drugs, or dietary supplements you use. Also tell them if you smoke, drink  alcohol, or use illegal drugs. Some items may interact with your medicine.  What should I watch for while using this medicine?  Tell your doctor or health care professional if your symptoms do not get better in a few days.  If you are diabetic you may get a false-positive result for sugar in your urine. Check with your doctor or health care professional before you change your diet or the dose of your diabetes medicine.  What side effects may I notice from receiving this medicine?  Side effects that you should report to your doctor or health care professional as soon as possible:  -allergic reactions like skin rash, itching or hives, swelling of the face, lips, or tongue  -breathing problems  -fever or chills  -redness, blistering, peeling or loosening of the skin, including inside the mouth  -seizures  -severe or watery diarrhea  -sore throat  -swollen joints  -trouble passing urine or change in the amount of urine  -unusual bleeding or bruising  -unusually weak or tired  Side effects that usually do not require medical attention (report to your doctor or health care professional if they continue or are bothersome):  -constipation  -dizziness  -gas or heartburn  -headache  -loss of appetite  -nausea or vomiting  -stomach pain  -stool discoloration  -vaginal itching  This list may not describe all possible side effects. Call your doctor for medical advice about side effects. You may report side effects to FDA at 5-697-FDA-9148.  Where should I keep my medicine?  Keep out of the reach of children.  Store at room temperature between 15 and 30 degrees C (59 and 86 degrees F). Throw the medicine away after the expiration date.  NOTE: This sheet is a summary. It may not cover all possible information. If you have questions about this medicine, talk to your doctor, pharmacist, or health care provider.  © 2014, [employer]. (2/27/2009 4:02:53 PM)

## 2017-03-12 ENCOUNTER — HOME CARE VISIT (OUTPATIENT)
Dept: HOME HEALTH SERVICES | Facility: HOME HEALTHCARE | Age: 27
End: 2017-03-12
Payer: MEDICARE

## 2017-03-12 VITALS
HEIGHT: 63 IN | HEART RATE: 70 BPM | TEMPERATURE: 95.9 F | RESPIRATION RATE: 20 BRPM | SYSTOLIC BLOOD PRESSURE: 118 MMHG | DIASTOLIC BLOOD PRESSURE: 74 MMHG

## 2017-03-12 PROCEDURE — 665999 HH PPS REVENUE DEBIT

## 2017-03-12 PROCEDURE — 665998 HH PPS REVENUE CREDIT

## 2017-03-12 PROCEDURE — G0162 HHC RN E&M PLAN SVS, 15 MIN: HCPCS

## 2017-03-12 SDOH — ECONOMIC STABILITY: HOUSING INSECURITY
HOME SAFETY: OXYGEN SAFETY RISK ASSESSMENT PERFORMED. PATIENT PT WAS GIVEN A NO SMOKING SIGN AND PROVIDED EDUCATION ABOUT WHY IT IS IMPORTANT TO PLACE ONE. PATIENT DOES NOT HAVE FLAMMABLE MATERIALS PRESENT IN THE HOME PRESENTING A FIRE HAZARD., INSTRUCTED PATIENT

## 2017-03-12 SDOH — ECONOMIC STABILITY: HOUSING INSECURITY: UNSAFE APPLIANCES: 0

## 2017-03-12 SDOH — ECONOMIC STABILITY: HOUSING INSECURITY: UNSAFE COOKING RANGE AREA: 0

## 2017-03-12 SDOH — ECONOMIC STABILITY: HOUSING INSECURITY

## 2017-03-12 ASSESSMENT — ENCOUNTER SYMPTOMS
DEPRESSED MOOD: 1
SHORTNESS OF BREATH: T

## 2017-03-12 ASSESSMENT — ACTIVITIES OF DAILY LIVING (ADL)
HOME_HEALTH_OASIS: 01
OASIS_M1830: 02

## 2017-03-12 ASSESSMENT — PATIENT HEALTH QUESTIONNAIRE - PHQ9
2. FEELING DOWN, DEPRESSED, IRRITABLE, OR HOPELESS: 01
1. LITTLE INTEREST OR PLEASURE IN DOING THINGS: 01

## 2017-03-13 ENCOUNTER — OFFICE VISIT (OUTPATIENT)
Dept: MEDICAL GROUP | Facility: CLINIC | Age: 27
End: 2017-03-13
Payer: MEDICARE

## 2017-03-13 ENCOUNTER — HOME CARE VISIT (OUTPATIENT)
Dept: HOME HEALTH SERVICES | Facility: HOME HEALTHCARE | Age: 27
End: 2017-03-13
Payer: MEDICARE

## 2017-03-13 ENCOUNTER — APPOINTMENT (OUTPATIENT)
Dept: RADIOLOGY | Facility: IMAGING CENTER | Age: 27
End: 2017-03-13
Attending: NURSE PRACTITIONER
Payer: MEDICARE

## 2017-03-13 VITALS
DIASTOLIC BLOOD PRESSURE: 98 MMHG | HEIGHT: 62 IN | TEMPERATURE: 96.9 F | HEART RATE: 100 BPM | SYSTOLIC BLOOD PRESSURE: 124 MMHG | OXYGEN SATURATION: 95 % | RESPIRATION RATE: 20 BRPM

## 2017-03-13 DIAGNOSIS — R06.02 SOB (SHORTNESS OF BREATH): ICD-10-CM

## 2017-03-13 PROCEDURE — 1111F DSCHRG MED/CURRENT MED MERGE: CPT | Performed by: NURSE PRACTITIONER

## 2017-03-13 PROCEDURE — G8432 DEP SCR NOT DOC, RNG: HCPCS | Performed by: NURSE PRACTITIONER

## 2017-03-13 PROCEDURE — 665998 HH PPS REVENUE CREDIT

## 2017-03-13 PROCEDURE — G8482 FLU IMMUNIZE ORDER/ADMIN: HCPCS | Performed by: NURSE PRACTITIONER

## 2017-03-13 PROCEDURE — 665999 HH PPS REVENUE DEBIT

## 2017-03-13 PROCEDURE — 99213 OFFICE O/P EST LOW 20 MIN: CPT | Performed by: NURSE PRACTITIONER

## 2017-03-13 PROCEDURE — 71020 DX-CHEST-2 VIEWS: CPT | Mod: TC | Performed by: NURSE PRACTITIONER

## 2017-03-13 PROCEDURE — 1036F TOBACCO NON-USER: CPT | Performed by: NURSE PRACTITIONER

## 2017-03-13 PROCEDURE — G8419 CALC BMI OUT NRM PARAM NOF/U: HCPCS | Performed by: NURSE PRACTITIONER

## 2017-03-13 ASSESSMENT — ENCOUNTER SYMPTOMS
WHEEZING: 1
SHORTNESS OF BREATH: 1
COUGH: 1
FEVER: 0
SPUTUM PRODUCTION: 1
CHILLS: 0

## 2017-03-13 NOTE — MR AVS SNAPSHOT
"        Kristin Balderrama   3/13/2017 8:40 AM   Office Visit   MRN: [id number]    Department:  [facility]   Dept Phone:  [phone]    Description:  Female : 1990   Provider:  VANIA Neves           Reason for Visit     URI nasal congestion/ chest congestion/ productive cough/fever/ chills/ x 3-4 days       Allergies as of 3/13/2017     Allergen Noted Reactions    Cefdinir 2016   Shortness of Breath, Itching    Tolerated 17    Depakote [Divalproex Sodium] 2010   Unspecified    Muscle spasms/muscle pain and weakness      Abilify 2013   Unspecified    Headaches/muscle twitching      Amitriptyline 10/31/2013   Unspecified    Headaches      Amoxicillin 2010   Rash    Pt states \"I get a rash\".      Ciprofloxacin 2009   Rash    Pt states \"I get a rash\".      Clindamycin 2011   Nausea    Even with food      Doxycycline 08/15/2012   Vomiting, Nausea    RXN=unknown    Ees [Erythromycin] 2010   Vomiting, Nausea    Flagyl [Metronidazole Hcl] 2011   Unspecified    \"eye problems\"      Flomax [Tamsulosin Hydrochloride] 2009   Swelling    Metformin 2013   Unspecified    Increased lactic acid       Sulfa Drugs 2010   Hives, Rash    RXN=since childhood    Tape 08/15/2012   Rash    Tears skin off   coban with Tegaderm tape ok  RXN=ongoing    Vancomycin 07/10/2016   Itching    Pt becomes flushed in face and chest.   RXN=7/10/16    Cephalexin [Keflex] 2017   Rash    Pt states she gets a rash with this medication    Levofloxacin 10/27/2016   Unspecified    Leg muscle cramps      You were diagnosed with     SOB (shortness of breath)   [232261]         Vital Signs     Blood Pressure Pulse Temperature Respirations Height       124/98 mmHg 100 36.1 °C (96.9 °F) 20 1.575 m (5' 2\")     Oxygen Saturation Last Menstrual Period Smoking Status             95% 2016 Passive Smoke Exposure - Never Smoker         Basic Information    " Date Of Birth Sex Race Ethnicity Preferred Language    1990 Female White Non- English      Your appointments     Mar 14, 2017  1:00 PM   New Patient with VALENTINE Benson   [facility] (--)    [address]  [place] [address] [phone]           Please bring enclosed paperwork completed along with your insurance card and photo ID.            Mar 14, 2017  3:00 PM   NEW MISC Infusion 2 HR with RN 1   Infusion Services ([facility])    [address] [facility] [address] [facility]  [place] [address] [phone]            Mar 15, 2017  9:00 AM   Individual Therapy with Blanca Brantley L.C.S.W.   [facility] ([facility])    [address]  [address]  [place] [address] [phone]            Mar 16, 2017 To Be Determined   SN-HH-HOME VISIT with [facility] (--)    [address]  [place] [address]   [phone]            Mar 17, 2017  8:30 AM   Follow Up Med Management with Ronny Burnette M.D.   [facility] [address] [place] [address] ([facility])    [address] [facility] [address]  [address]  [place] [address]   [phone]            Mar 21, 2017  9:00 AM   PT-HH-HOME VISIT with Abhishek Baker P.T.   [facility] (--)    [address]  [place] [address]   [phone]            Mar 21, 2017 To Be Determined   SN-HH-HOME VISIT with [facility] (--)    [address]  [place] [address]   [phone]            Mar 23, 2017  9:00 AM   VH-QG-XTMLMPAAFO DISCHARGE with Abhishek Baker P.T.   [facility] (--)    [address]  [place] [address]   [phone]            Mar 23, 2017 10:20 AM   Established Patient with Torres Brody M.D.   [facility] [address]    [address]  [place] [address]   [phone]           You will be receiving a confirmation call a few days before your appointment from our automated call confirmation system.            Mar 24, 2017 To Be  Determined   SN-HH-HOME VISIT with aMry Goff L.P.N.   [facility] (--)    [address]  [place] [address]   [phone]            Mar 28, 2017 To Be Determined   SN-HH-HOME VISIT with Shelbi Ruby R.N.   [facility] (--)    [address]  [place] [address]   [phone]            Mar 29, 2017  9:00 AM   Individual Therapy with Blanca Brantley L.C.S.W.   [facility] ([facility])    [address]  [address]  [place] [address] [phone]            Mar 31, 2017 To Be Determined   SN-HH-HOME VISIT with Shelbi Ruby R.N.   [facility] (--)    [address]  [place] [address]   [phone]            Apr 04, 2017 To Be Determined   SN-HH-HOME VISIT with Shelbi Ruby R.N.   [facility] (--)    [address]  [place] [address]   [phone]            Apr 07, 2017 To Be Determined   SN-HH-HOME VISIT with Shelbi Ruby R.N.   [facility] (--)    [address]  [place] [address]   [phone]            Apr 11, 2017 To Be Determined   SN-HH-HOME VISIT with Shelbi Ruby R.N.   [facility] (--)    [address]  [place] [address]   [phone]            Apr 12, 2017  9:00 AM   Individual Therapy with Blanca Brantley L.C.S.W.   [facility] ([facility])    [address]  [address]  [place] [id number] [phone]            Apr 14, 2017 To Be Determined   SN-HH-HOME VISIT with Shelbi Ruby R.N.   [facility] (--)    [address]  [place] [address]   [phone]            Apr 18, 2017 To Be Determined   SN-HH-HOME VISIT with [facility] (--)    [address]  [facility] [address]   [phone]            Apr 21, 2017 To Be Determined   SN-HH-HOME VISIT with [facility] (--)    [address]  [facility] [address]   [phone]            Apr 24, 2017 To Be Determined   SN-HH-HOME VISIT with [facility] (--)    [address]  [facility] [address]   [phone]            Apr 27, 2017 To Be Determined   SN-HH-HOME VISIT with [facility] (--)    [address] [facility]  [facility] [address]   [phone]            May 03, 2017  3:40 PM   Follow Up Visit with Sandoval Fox M.D.   [facility] (--)    [address]  [facility] [address] [phone]           You will be receiving a confirmation call a few days before your appointment from our automated call confirmation system.            Jul 19, 2017  8:40 AM   FOLLOW UP with Torres Kumar M.D.   [facility] (--)    [address]  [place] [address] [phone]              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 Low  9/18/2010 - Present    Neurogenic bladder N31.9 Low  4/2/2011 - Present    Sinus tachycardia (Chronic) R00.0 High  10/31/2013 - Present    Knee pain, right M25.561 Low  2/13/2014 - Present    Anxiety F41.9 Low  12/16/2014 - Present    Fatty liver disease, nonalcoholic K76.0 Low  1/19/2015 - Present    Progressive focal motor weakness M62.81 Low  6/28/2015 - Present    Chronic back pain M54.9, G89.29 Low  6/29/2015 - Present    Hypothyroidism (Chronic) E03.9 Low  11/23/2015 - Present    PCOS (polycystic ovarian syndrome) E28.2 Low  11/23/2015 - Present    H/O prior ablation treatment Z98.890   2/10/2016 - Present    Peripheral neuropathy (CMS-HCC) (Chronic) G62.9   3/6/2016 - Present    TONYA on CPAP G47.33 Low  3/7/2016 - Present    Morbidly obese (CMS-HCC) E66.01   3/7/2016 - Present    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   3/7/2016 - Present    Scoliosis M41.9   3/7/2016 - Present    GERD (gastroesophageal reflux disease) (Chronic) K21.9   3/7/2016 - Present    Vitamin D deficiency E55.9   5/21/2016 - Present    Chronic inflammatory arthritis  "(Chronic) M19.90 Medium  5/23/2016 - Present    Right flank pain R10.9   6/6/2016 - Present    Weakness of both lower extremities M62.81   6/22/2016 - Present    Elevated sedimentation rate R70.0   6/27/2016 - Present    Galactorrhea O92.6   7/22/2016 - Present    Psychosis, schizophrenia, simple (HCC) (Chronic) F20.89 Low Chronic 9/29/2016 - Present    Schizophrenia (CMS-HCC) F20.9 Low  10/27/2016 - Present    Paralysis (CMS-HCC) G83.9 High  10/27/2016 - Present    Chronic pain syndrome (Chronic) G89.4 Medium  10/27/2016 - Present    Suprapubic catheter (CMS-HCC) (Chronic) Z93.59 Low  10/27/2016 - Present    Bowel and bladder incontinence R32, R15.9   10/27/2016 - Present    Depression F32.9 Low  10/28/2016 - Present    HTN (hypertension) (Chronic) I10 Medium  11/1/2016 - Present    Hypovitaminosis D E55.9   11/29/2016 - Present    Leg weakness M62.81   1/4/2017 - Present    Weakness R53.1   1/22/2017 - Present    Paraparesis of both lower limbs (CMS-[facility]) G82.20 High  1/24/2017 - Present    Chronic suprapubic catheter (CMS-[facility]) Z93.59   2/16/2017 - Present    Leg weakness, bilateral M62.81   2/22/2017 - Present    Weakness of right upper extremity M62.81   2/23/2017 - Present    UTI (urinary tract infection) N39.0   3/9/2017 - Present    Sepsis (CMS-HCC) A41.9   3/9/2017 - Present      Health Maintenance        Date Due Completion Dates    IMM HEP A VACCINE (1 of 2 - Standard Series) 10/13/1990 ---    IMM VARICELLA (CHICKENPOX) VACCINE (1 of 2 - 2 Dose Adolescent Series) 10/13/2002 ---    PAP SMEAR 7/22/2019 7/22/2016 (Postponed), 2/19/2013 (N/S)    Override on 7/22/2016: Postponed (per pt was told could \"skip\" 2016)    Override on 2/19/2013: (N/S) ([facility])    IMM DTaP/Tdap/Td Vaccine (7 - Td) 8/6/2022 8/6/2012, 9/18/2010, 3/3/1994, 5/17/1991, 5/10/1990, 3/23/1990, 1990    COLONOSCOPY 9/25/2023 9/25/2013            Current Immunizations     Dtap Vaccine 3/3/1994, 5/17/1991, 5/10/1990, 3/23/1990, 1990    " HIB Vaccine(PEDVAX) 1/11/1991    HPV Quadrivalent Vaccine (GARDASIL) 10/27/2011, 5/27/2011, 3/1/2011    Hepatitis B Vaccine Non-Recombivax (Ped/Adol) 1/28/2004, 10/28/2003, 10/29/1999    Influenza TIV (IM) 9/5/2013, 12/7/2011, 11/7/2011    Influenza Vaccine Adult HD 10/5/2016    MMR Vaccine 3/3/1994, 1/11/1991    OPV - Historical Data 3/3/1994, 5/17/1991, 5/10/1990, 3/23/1990, 1990    Pneumococcal Vaccine (PCV7) Historical Data 11/8/2015    Pneumococcal polysaccharide vaccine (PPSV-23) 1/23/2017  5:35 PM    TD Vaccine 9/18/2010  4:45 PM    Tdap Vaccine 8/6/2012      Below and/or attached are the medications your provider expects you to take. Review all of your home medications and newly ordered medications with your provider and/or pharmacist. Follow medication instructions as directed by your provider and/or pharmacist. Please keep your medication list with you and share with your provider. Update the information when medications are discontinued, doses are changed, or new medications (including over-the-counter products) are added; and carry medication information at all times in the event of emergency situations     Allergies:  CEFDINIR - Shortness of Breath,Itching     DEPAKOTE - Unspecified     ABILIFY - Unspecified     AMITRIPTYLINE - Unspecified     AMOXICILLIN - Rash     CIPROFLOXACIN - Rash     CLINDAMYCIN - Nausea     DOXYCYCLINE - Vomiting,Nausea     EES - Vomiting,Nausea     FLAGYL - Unspecified     FLOMAX - Swelling     METFORMIN - Unspecified     SULFA DRUGS - Hives,Rash     TAPE - Rash     VANCOMYCIN - Itching     CEPHALEXIN - Rash     LEVOFLOXACIN - Unspecified               Medications  Valid as of: March 13, 2017 - 10:13 AM    Generic Name Brand Name Tablet Size Instructions for use    Albuterol Sulfate (Aero Soln) albuterol 108 (90 BASE) MCG/ACT Inhale 2 Puffs by mouth every 6 hours as needed for Shortness of Breath.        Aspirin (Tablet Delayed Response) ECOTRIN 81 MG Take 1 Tab by mouth  every day.        Atorvastatin Calcium (Tab) LIPITOR 40 MG Take 1 Tab by mouth every bedtime.        Cefdinir (Cap) OMNICEF 300 MG Take 1 Cap by mouth 2 times a day for 7 days.        Cranberry (Cap) Cranberry 1000 MG Take 1 Cap by mouth 2 times a day, with meals.        Cyanocobalamin (Tab) vitamin b12 500 MCG Take 1,000 mcg by mouth 2 times a day.        Cyclobenzaprine HCl (Tab) FLEXERIL 10 MG Take 10 mg by mouth 2 Times a Day. Indications: Muscle Spasm        Ergocalciferol (Cap) DRISDOL 63335 UNITS Take 50,000 Units by mouth every Friday.        FentaNYL (PATCH 72 HR) DURAGESIC 25 MCG/HR Apply 1 Patch to skin as directed every 72 hours.        FLUoxetine HCl (Cap) PROZAC 10 MG Take 1 Cap by mouth every day.        Furosemide (Tab) LASIX 20 MG Take 20 mg by mouth every day.        Gabapentin (Tab) NEURONTIN 600 MG Take 600-1,200 mg by mouth 2 times a day. 600 mg AM   1200 mg PM        Ivabradine HCl (Tab) CORLANOR 5 MG Take 5 mg by mouth 2 times a day, with meals.        Lactulose (Solution) lactulose 10 GM/15ML Take 10 g by mouth 2 times a day.        Levothyroxine Sodium (Tab) SYNTHROID 75 MCG Take 75 mcg by mouth Every morning on an empty stomach.        Melatonin (Tab) Melatonin 5 MG Take 5 mg by mouth 1 time daily as needed (for sleep).        Nystatin (Powder) MYCOSTATIN  Apply 1 Application to affected area(s) 3 times a day.        Omeprazole (CAPSULE DELAYED RELEASE) PRILOSEC 20 MG Take 20 mg by mouth 2 times a day.        Oxycodone-Acetaminophen (Tab) PERCOCET-10  MG Take 2 Tabs by mouth every 6 hours. Two tabs  Indications: Pain        Promethazine HCl (Tab) PHENERGAN 25 MG Take 1 Tab by mouth every 6 hours as needed for Nausea/Vomiting.        RaNITidine HCl (Tab) ZANTAC 150 MG Take 150 mg by mouth 2 times a day.        Sodium Bicarbonate (Tab) sodium bicarbonate 325 MG Take 2 Tabs by mouth 3 times a day.        Ziprasidone HCl (Cap) GEODON 80 MG Take 160 mg by mouth every evening.        .                    Medicines prescribed today were sent to:     [id number] - [place], [place] - [address]    [address] [place] [address]    Phone: [phone] Fax: [phone]    Open 24 Hours?: No      Medication refill instructions:       If your prescription bottle indicates you have medication refills left, it is not necessary to call your provider’s office. Please contact your pharmacy and they will refill your medication.    If your prescription bottle indicates you do not have any refills left, you may request refills at any time through one of the following ways: The online [employer] system (except Urgent Care), by calling your provider’s office, or by asking your pharmacy to contact your provider’s office with a refill request. Medication refills are processed only during regular business hours and may not be available until the next business day. Your provider may request additional information or to have a follow-up visit with you prior to refilling your medication.   *Please Note: Medication refills are assigned a new Rx number when refilled electronically. Your pharmacy may indicate that no refills were authorized even though a new prescription for the same medication is available at the pharmacy. Please request the medicine by name with the pharmacy before contacting your provider for a refill.        Your To Do List     Future Labs/Procedures Complete By Expires    CBC WITH DIFFERENTIAL  As directed 3/13/2018    COMP METABOLIC PANEL  As directed 3/13/2018    D-DIMER  As directed 3/13/2018    DX-CHEST-2 VIEWS  As directed 3/13/2018      Other Notes About Your Plan     DME:  [facility] / ph [phone] / fax [phone]              MyChart Access Code: Activation code not generated  Current [employer] Status: Active

## 2017-03-13 NOTE — PROGRESS NOTES
Chief Complaint   Patient presents with   • URI     nasal congestion/ chest congestion/ productive cough/fever/ chills/ x 3-4 days        HISTORY OF PRESENT ILLNESS: Patient is a 26 y.o. female established patient who presents today due to 3-4 days of URI symptoms. Pt has underlying disease of sleep apnea, not using CPAP at this time and asthma. She reports that she has been having hard time coughing up phlegm, sob, chest congestion for 3-4 days. Last night, she wakes up due to sob and headache, she took deep breathing and her saturation finally comes up from lower 80% to 95%. She use prn albuterol but it does not seem to help too much. She was just out of hospital and currently taking omnicef for her UTI. She still have about 5 days of omnicef left. She denied fever or chills. She reports that she is not using CPAP is because it is not giving her oxygen. It only opens up her airway but she has hard time breathing. She is scheduled to another sleep study on 4/28.       Patient Active Problem List    Diagnosis Date Noted   • Paraparesis of both lower limbs (CMS-HCC) 01/24/2017     Priority: High   • Paralysis (CMS-HCC) 10/27/2016     Priority: High   • Sinus tachycardia 10/31/2013     Priority: High   • HTN (hypertension) 11/01/2016     Priority: Medium   • Chronic pain syndrome 10/27/2016     Priority: Medium   • Chronic inflammatory arthritis 05/23/2016     Priority: Medium   • Depression 10/28/2016     Priority: Low   • Schizophrenia (CMS-HCC) 10/27/2016     Priority: Low   • Suprapubic catheter (CMS-HCC) 10/27/2016     Priority: Low   • Psychosis, schizophrenia, simple ([facility]) 09/29/2016     Priority: Low     Class: Chronic   • TONYA on CPAP 03/07/2016     Priority: Low   • Hypothyroidism 11/23/2015     Priority: Low   • PCOS (polycystic ovarian syndrome) 11/23/2015     Priority: Low   • Chronic back pain 06/29/2015     Priority: Low   • Progressive focal motor weakness 06/28/2015     Priority: Low   • Fatty liver  disease, nonalcoholic 01/19/2015     Priority: Low   • Anxiety 12/16/2014     Priority: Low   • Knee pain, right 02/13/2014     Priority: Low   • Neurogenic bladder 04/02/2011     Priority: Low   • Chronic UTI 09/18/2010     Priority: Low   • Multiple personality disorder 09/18/2010     Priority: Low   • UTI (urinary tract infection) 03/09/2017   • Sepsis (CMS-HCC) 03/09/2017   • Weakness of right upper extremity 02/23/2017   • Leg weakness, bilateral 02/22/2017   • Chronic suprapubic catheter (CMS-HCC) 02/16/2017   • Weakness 01/22/2017   • Leg weakness 01/04/2017   • Hypovitaminosis D 11/29/2016   • Bowel and bladder incontinence 10/27/2016   • Galactorrhea 07/22/2016   • Elevated sedimentation rate 06/27/2016   • Weakness of both lower extremities 06/22/2016   • Right flank pain 06/06/2016   • Vitamin D deficiency 05/21/2016   • Morbidly obese (CMS-HCC) 03/07/2016   • Conversion disorder 03/07/2016   • Scoliosis 03/07/2016   • GERD (gastroesophageal reflux disease) 03/07/2016   • Peripheral neuropathy (CMS-HCC) 03/06/2016   • H/O prior ablation treatment 02/10/2016       Allergies:Cefdinir; Depakote; Abilify; Amitriptyline; Amoxicillin; Ciprofloxacin; Clindamycin; Doxycycline; Ees; Flagyl; Flomax; Metformin; Sulfa drugs; Tape; Vancomycin; Cephalexin; and Levofloxacin    Current Outpatient Prescriptions   Medication Sig Dispense Refill   • nystatin (MYCOSTATIN) Powder Apply 1 Application to affected area(s) 3 times a day. 1 Bottle 2   • cefdinir (OMNICEF) 300 MG Cap Take 1 Cap by mouth 2 times a day for 7 days. 14 Cap 0   • Cranberry 1000 MG Cap Take 1 Cap by mouth 2 times a day, with meals. 60 Cap 3   • gabapentin (NEURONTIN) 600 MG tablet Take 600-1,200 mg by mouth 2 times a day. 600 mg AM   1200 mg PM     • Melatonin 5 MG Tab Take 5 mg by mouth 1 time daily as needed (for sleep).     • ivabradine (CORLANOR) 5 MG Tab tablet Take 5 mg by mouth 2 times a day, with meals.     • atorvastatin (LIPITOR) 40 MG Tab Take  1 Tab by mouth every bedtime. 30 Tab 0   • aspirin EC (ECOTRIN) 81 MG Tablet Delayed Response Take 1 Tab by mouth every day. 30 Tab 0   • ziprasidone (GEODON) 80 MG Cap Take 160 mg by mouth every evening.     • furosemide (LASIX) 20 MG Tab Take 20 mg by mouth every day.     • fentanyl (DURAGESIC) 25 MCG/HR PATCH 72 HR Apply 1 Patch to skin as directed every 72 hours.     • lactulose 10 GM/15ML Solution Take 10 g by mouth 2 times a day.     • vitamin D, Ergocalciferol, (DRISDOL) 27960 UNITS Cap capsule Take 50,000 Units by mouth every Friday.     • cyclobenzaprine (FLEXERIL) 10 MG Tab Take 10 mg by mouth 2 Times a Day. Indications: Muscle Spasm     • fluoxetine (PROZAC) 10 MG Cap Take 1 Cap by mouth every day. 30 Cap 1   • oxycodone-acetaminophen (PERCOCET-10)  MG Tab Take 2 Tabs by mouth every 6 hours. Two tabs  Indications: Pain     • promethazine (PHENERGAN) 25 MG Tab Take 1 Tab by mouth every 6 hours as needed for Nausea/Vomiting. 30 Tab 0   • albuterol 108 (90 BASE) MCG/ACT Aero Soln inhalation aerosol Inhale 2 Puffs by mouth every 6 hours as needed for Shortness of Breath.     • cyanocobalamin (HM VITAMIN B12) 500 MCG tablet Take 1,000 mcg by mouth 2 times a day.     • sodium bicarbonate 325 MG Tab Take 2 Tabs by mouth 3 times a day.     • levothyroxine (SYNTHROID) 75 MCG Tab Take 75 mcg by mouth Every morning on an empty stomach.     • omeprazole (PRILOSEC) 20 MG delayed-release capsule Take 20 mg by mouth 2 times a day.     • ranitidine (ZANTAC) 150 MG Tab Take 150 mg by mouth 2 times a day.       No current facility-administered medications for this visit.       Social History   Substance Use Topics   • Smoking status: Passive Smoke Exposure - Never Smoker     Types: Cigarettes   • Smokeless tobacco: Never Used   • Alcohol Use: No       Family Status   Relation Status Death Age   • Mother Alive      44 2016   • Maternal Uncle Alive    • Maternal Grandmother Alive    • Father Alive      bio father hx  "unknown   • Sister Alive      Family History   Problem Relation Age of Onset   • Hypertension Mother    • Sleep Apnea Mother    • Heart Disease Mother    • Other Mother      hypothryod   • Hypertension Maternal Uncle    • Heart Disease Maternal Grandmother    • Hypertension Maternal Grandmother    • Other Sister      Narcolepsy;fibromyalsia;bone;nerve   • Genitourinary () Sister      endometriosis         ROS:  Review of Systems   Constitutional: Negative for fever and chills.   Respiratory: Positive for cough, sputum production, shortness of breath and wheezing.    Genitourinary:        Andrade in place for neurogenic bladder        Objective:     Blood pressure 124/98, pulse 100, temperature 36.1 °C (96.9 °F), resp. rate 20, height 1.575 m (5' 2\"), last menstrual period 07/16/2016, SpO2 95 %.     Physical Exam:  Physical Exam   Constitutional: She is oriented to person, place, and time and well-developed, well-nourished, and in no distress.   HENT:   Head: Normocephalic and atraumatic.   Right Ear: Tympanic membrane normal. There is tenderness. No swelling.   Left Ear: Tympanic membrane normal. There is tenderness. No swelling.   Nose: Rhinorrhea present. Right sinus exhibits no maxillary sinus tenderness and no frontal sinus tenderness. Left sinus exhibits no maxillary sinus tenderness and no frontal sinus tenderness.   Mouth/Throat: No oropharyngeal exudate, posterior oropharyngeal edema, posterior oropharyngeal erythema or tonsillar abscesses.   Eyes: Conjunctivae are normal.   Neck: Neck supple. No JVD present.   Cardiovascular: Normal rate.    Pulmonary/Chest: No respiratory distress. She has no wheezes.   Diminished   Abdominal: Soft. She exhibits no distension.   Musculoskeletal: Normal range of motion.   Neurological: She is alert and oriented to person, place, and time.   Skin: Skin is warm. No erythema.   Vitals reviewed.        Assessment/Plan:  1. SOB (shortness of breath), most likely virus infection " and also associated with her sleep apnea  - DX-CHEST-2 VIEWS; Future: no acute abnormality.  - We walk pt to check saturation, without oxygen, the lowest saturation is 89%. No acute respiratory distress noted in the clinic. Pt is stable at this time but pt is instructed to go to ER if she does feel worsening sob.   - CBC WITH DIFFERENTIAL; Future  - COMP METABOLIC PANEL; Future  - D-DIMER; Future  - Sleep study on 4/28. MA will call to see if able to get sooner appointment, will call pt to let her know.

## 2017-03-14 ENCOUNTER — APPOINTMENT (OUTPATIENT)
Dept: PHYSICAL THERAPY | Facility: REHABILITATION | Age: 27
End: 2017-03-14
Attending: INTERNAL MEDICINE
Payer: MEDICARE

## 2017-03-14 ENCOUNTER — APPOINTMENT (OUTPATIENT)
Dept: SLEEP MEDICINE | Facility: MEDICAL CENTER | Age: 27
End: 2017-03-14
Payer: MEDICARE

## 2017-03-14 LAB
BACTERIA BLD CULT: NORMAL
BACTERIA BLD CULT: NORMAL
SIGNIFICANT IND 70042: NORMAL
SIGNIFICANT IND 70042: NORMAL
SITE SITE: NORMAL
SITE SITE: NORMAL
SOURCE SOURCE: NORMAL
SOURCE SOURCE: NORMAL

## 2017-03-14 PROCEDURE — 665999 HH PPS REVENUE DEBIT

## 2017-03-14 PROCEDURE — 665998 HH PPS REVENUE CREDIT

## 2017-03-15 ENCOUNTER — APPOINTMENT (OUTPATIENT)
Dept: BEHAVIORAL HEALTH | Facility: PHYSICIAN GROUP | Age: 27
End: 2017-03-15
Payer: MEDICARE

## 2017-03-15 PROCEDURE — 665999 HH PPS REVENUE DEBIT

## 2017-03-15 PROCEDURE — 665998 HH PPS REVENUE CREDIT

## 2017-03-16 ENCOUNTER — APPOINTMENT (OUTPATIENT)
Dept: RADIOLOGY | Facility: MEDICAL CENTER | Age: 27
DRG: 871 | End: 2017-03-16
Attending: EMERGENCY MEDICINE
Payer: MEDICARE

## 2017-03-16 ENCOUNTER — HOME CARE VISIT (OUTPATIENT)
Dept: HOME HEALTH SERVICES | Facility: HOME HEALTHCARE | Age: 27
End: 2017-03-16
Payer: MEDICARE

## 2017-03-16 ENCOUNTER — RESOLUTE PROFESSIONAL BILLING HOSPITAL PROF FEE (OUTPATIENT)
Dept: HOSPITALIST | Facility: MEDICAL CENTER | Age: 27
End: 2017-03-16
Payer: MEDICARE

## 2017-03-16 ENCOUNTER — TELEPHONE (OUTPATIENT)
Dept: MEDICAL GROUP | Facility: MEDICAL CENTER | Age: 27
End: 2017-03-16

## 2017-03-16 ENCOUNTER — HOSPITAL ENCOUNTER (INPATIENT)
Facility: MEDICAL CENTER | Age: 27
LOS: 4 days | DRG: 871 | End: 2017-03-20
Attending: EMERGENCY MEDICINE | Admitting: HOSPITALIST
Payer: MEDICARE

## 2017-03-16 VITALS
DIASTOLIC BLOOD PRESSURE: 60 MMHG | HEART RATE: 94 BPM | SYSTOLIC BLOOD PRESSURE: 128 MMHG | RESPIRATION RATE: 24 BRPM | TEMPERATURE: 98.5 F

## 2017-03-16 DIAGNOSIS — R29.898 LEG WEAKNESS, BILATERAL: ICD-10-CM

## 2017-03-16 DIAGNOSIS — R53.1 PROGRESSIVE FOCAL MOTOR WEAKNESS: ICD-10-CM

## 2017-03-16 DIAGNOSIS — R70.0 ELEVATED SEDIMENTATION RATE: ICD-10-CM

## 2017-03-16 DIAGNOSIS — M54.5 CHRONIC LOW BACK PAIN, UNSPECIFIED BACK PAIN LATERALITY, WITH SCIATICA PRESENCE UNSPECIFIED: ICD-10-CM

## 2017-03-16 DIAGNOSIS — Z93.59 CHRONIC SUPRAPUBIC CATHETER (HCC): ICD-10-CM

## 2017-03-16 DIAGNOSIS — R29.898 WEAKNESS OF RIGHT UPPER EXTREMITY: ICD-10-CM

## 2017-03-16 DIAGNOSIS — E28.2 PCOS (POLYCYSTIC OVARIAN SYNDROME): ICD-10-CM

## 2017-03-16 DIAGNOSIS — M19.90 CHRONIC INFLAMMATORY ARTHRITIS: Chronic | ICD-10-CM

## 2017-03-16 DIAGNOSIS — N39.0 CHRONIC UTI: Chronic | ICD-10-CM

## 2017-03-16 DIAGNOSIS — F41.9 ANXIETY: ICD-10-CM

## 2017-03-16 DIAGNOSIS — R15.9 BOWEL AND BLADDER INCONTINENCE: ICD-10-CM

## 2017-03-16 DIAGNOSIS — G83.9 PARALYSIS (HCC): ICD-10-CM

## 2017-03-16 DIAGNOSIS — F20.9 SCHIZOPHRENIA, UNSPECIFIED TYPE (HCC): ICD-10-CM

## 2017-03-16 DIAGNOSIS — N64.3 GALACTORRHEA: ICD-10-CM

## 2017-03-16 DIAGNOSIS — F20.89 PSYCHOSIS, SCHIZOPHRENIA, SIMPLE (HCC): Chronic | ICD-10-CM

## 2017-03-16 DIAGNOSIS — E66.01 MORBID OBESITY DUE TO EXCESS CALORIES (HCC): ICD-10-CM

## 2017-03-16 DIAGNOSIS — R10.9 RIGHT FLANK PAIN: ICD-10-CM

## 2017-03-16 DIAGNOSIS — A41.9 SEPTIC SHOCK (HCC): ICD-10-CM

## 2017-03-16 DIAGNOSIS — F44.9 CONVERSION DISORDER: Chronic | ICD-10-CM

## 2017-03-16 DIAGNOSIS — R65.21 SEPTIC SHOCK (HCC): ICD-10-CM

## 2017-03-16 DIAGNOSIS — F44.81 MULTIPLE PERSONALITY DISORDER (HCC): ICD-10-CM

## 2017-03-16 DIAGNOSIS — Z93.59 SUPRAPUBIC CATHETER (HCC): Chronic | ICD-10-CM

## 2017-03-16 DIAGNOSIS — R29.898 WEAKNESS OF BOTH LOWER EXTREMITIES: ICD-10-CM

## 2017-03-16 DIAGNOSIS — G89.29 CHRONIC LOW BACK PAIN, UNSPECIFIED BACK PAIN LATERALITY, WITH SCIATICA PRESENCE UNSPECIFIED: ICD-10-CM

## 2017-03-16 DIAGNOSIS — K21.9 GASTROESOPHAGEAL REFLUX DISEASE WITHOUT ESOPHAGITIS: Chronic | ICD-10-CM

## 2017-03-16 DIAGNOSIS — N30.00 ACUTE CYSTITIS WITHOUT HEMATURIA: ICD-10-CM

## 2017-03-16 DIAGNOSIS — E55.9 VITAMIN D DEFICIENCY: ICD-10-CM

## 2017-03-16 DIAGNOSIS — E55.9 HYPOVITAMINOSIS D: ICD-10-CM

## 2017-03-16 DIAGNOSIS — E03.9 ACQUIRED HYPOTHYROIDISM: Chronic | ICD-10-CM

## 2017-03-16 DIAGNOSIS — R00.0 SINUS TACHYCARDIA: Chronic | ICD-10-CM

## 2017-03-16 DIAGNOSIS — N31.9 NEUROGENIC BLADDER: ICD-10-CM

## 2017-03-16 DIAGNOSIS — K76.0 FATTY LIVER DISEASE, NONALCOHOLIC: ICD-10-CM

## 2017-03-16 DIAGNOSIS — R53.1 WEAKNESS: ICD-10-CM

## 2017-03-16 DIAGNOSIS — G89.4 CHRONIC PAIN SYNDROME: Chronic | ICD-10-CM

## 2017-03-16 DIAGNOSIS — G47.33 OSA ON CPAP: ICD-10-CM

## 2017-03-16 DIAGNOSIS — R32 BOWEL AND BLADDER INCONTINENCE: ICD-10-CM

## 2017-03-16 DIAGNOSIS — G82.20 PARAPARESIS OF BOTH LOWER LIMBS (HCC): ICD-10-CM

## 2017-03-16 DIAGNOSIS — Z98.890 H/O PRIOR ABLATION TREATMENT: ICD-10-CM

## 2017-03-16 LAB
ALBUMIN SERPL BCP-MCNC: 3.9 G/DL (ref 3.2–4.9)
ALBUMIN/GLOB SERPL: 1.3 G/DL
ALP SERPL-CCNC: 58 U/L (ref 30–99)
ALT SERPL-CCNC: 78 U/L (ref 2–50)
ANION GAP SERPL CALC-SCNC: 14 MMOL/L (ref 0–11.9)
APPEARANCE UR: CLEAR
AST SERPL-CCNC: 59 U/L (ref 12–45)
BACTERIA #/AREA URNS HPF: ABNORMAL /HPF
BASOPHILS # BLD AUTO: 0 % (ref 0–1.8)
BASOPHILS # BLD: 0 K/UL (ref 0–0.12)
BILIRUB SERPL-MCNC: 1 MG/DL (ref 0.1–1.5)
BILIRUB UR QL STRIP.AUTO: NEGATIVE
BUN SERPL-MCNC: 10 MG/DL (ref 8–22)
CALCIUM SERPL-MCNC: 9.2 MG/DL (ref 8.4–10.2)
CHLORIDE SERPL-SCNC: 98 MMOL/L (ref 96–112)
CO2 SERPL-SCNC: 24 MMOL/L (ref 20–33)
COLOR UR: YELLOW
CREAT SERPL-MCNC: 0.85 MG/DL (ref 0.5–1.4)
EKG IMPRESSION: NORMAL
EOSINOPHIL # BLD AUTO: 0.16 K/UL (ref 0–0.51)
EOSINOPHIL NFR BLD: 3 % (ref 0–6.9)
EPI CELLS #/AREA URNS HPF: ABNORMAL /HPF
ERYTHROCYTE [DISTWIDTH] IN BLOOD BY AUTOMATED COUNT: 45.4 FL (ref 35.9–50)
FLUAV H1 2009 PAND RNA SPEC QL NAA+PROBE: NOT DETECTED
FLUAV RNA SPEC QL NAA+PROBE: NEGATIVE
FLUAV+FLUBV AG SPEC QL IA: NORMAL
FLUBV RNA SPEC QL NAA+PROBE: NEGATIVE
GFR SERPL CREATININE-BSD FRML MDRD: >60 ML/MIN/1.73 M 2
GLOBULIN SER CALC-MCNC: 3.1 G/DL (ref 1.9–3.5)
GLUCOSE SERPL-MCNC: 229 MG/DL (ref 65–99)
GLUCOSE UR STRIP.AUTO-MCNC: NEGATIVE MG/DL
HCT VFR BLD AUTO: 34.8 % (ref 37–47)
HGB BLD-MCNC: 11.9 G/DL (ref 12–16)
KETONES UR STRIP.AUTO-MCNC: NEGATIVE MG/DL
LACTATE BLD-SCNC: 2.4 MMOL/L (ref 0.5–2)
LACTATE BLD-SCNC: 3.83 MMOL/L (ref 0.5–2)
LACTATE BLD-SCNC: 4.18 MMOL/L (ref 0.5–2)
LACTATE BLD-SCNC: 4.24 MMOL/L (ref 0.5–2)
LEUKOCYTE ESTERASE UR QL STRIP.AUTO: NEGATIVE
LYMPHOCYTES # BLD AUTO: 1.35 K/UL (ref 1–4.8)
LYMPHOCYTES NFR BLD: 25 % (ref 22–41)
MANUAL DIFF BLD: NORMAL
MCH RBC QN AUTO: 30.4 PG (ref 27–33)
MCHC RBC AUTO-ENTMCNC: 34.2 G/DL (ref 33.6–35)
MCV RBC AUTO: 89 FL (ref 81.4–97.8)
MICRO URNS: ABNORMAL
MONOCYTES # BLD AUTO: 0.43 K/UL (ref 0–0.85)
MONOCYTES NFR BLD AUTO: 8 % (ref 0–13.4)
MUCOUS THREADS #/AREA URNS HPF: ABNORMAL /HPF
NEUTROPHILS # BLD AUTO: 3.46 K/UL (ref 2–7.15)
NEUTROPHILS NFR BLD: 63 % (ref 44–72)
NEUTS BAND NFR BLD MANUAL: 1 % (ref 0–10)
NITRITE UR QL STRIP.AUTO: NEGATIVE
NRBC # BLD AUTO: 0 K/UL
NRBC BLD AUTO-RTO: 0 /100 WBC
PH UR STRIP.AUTO: 5.5 [PH]
PLATELET # BLD AUTO: 146 K/UL (ref 164–446)
PMV BLD AUTO: 11.6 FL (ref 9–12.9)
POTASSIUM SERPL-SCNC: 3.3 MMOL/L (ref 3.6–5.5)
PROT SERPL-MCNC: 7 G/DL (ref 6–8.2)
PROT UR QL STRIP: NEGATIVE MG/DL
RBC # BLD AUTO: 3.91 M/UL (ref 4.2–5.4)
RBC # URNS HPF: ABNORMAL /HPF
RBC UR QL AUTO: ABNORMAL
SIGNIFICANT IND 70042: NORMAL
SITE SITE: NORMAL
SODIUM SERPL-SCNC: 136 MMOL/L (ref 135–145)
SOURCE SOURCE: NORMAL
SP GR UR STRIP.AUTO: 1.02
SPECIMEN DRAWN FROM PATIENT: ABNORMAL
WBC # BLD AUTO: 5.4 K/UL (ref 4.8–10.8)
WBC #/AREA URNS HPF: ABNORMAL /HPF

## 2017-03-16 PROCEDURE — 96365 THER/PROPH/DIAG IV INF INIT: CPT

## 2017-03-16 PROCEDURE — 96375 TX/PRO/DX INJ NEW DRUG ADDON: CPT

## 2017-03-16 PROCEDURE — 71010 DX-CHEST-PORTABLE (1 VIEW): CPT

## 2017-03-16 PROCEDURE — 700105 HCHG RX REV CODE 258: Performed by: HOSPITALIST

## 2017-03-16 PROCEDURE — 304561 HCHG STAT O2

## 2017-03-16 PROCEDURE — 94760 N-INVAS EAR/PLS OXIMETRY 1: CPT

## 2017-03-16 PROCEDURE — 87400 INFLUENZA A/B EACH AG IA: CPT

## 2017-03-16 PROCEDURE — 665998 HH PPS REVENUE CREDIT

## 2017-03-16 PROCEDURE — 85027 COMPLETE CBC AUTOMATED: CPT

## 2017-03-16 PROCEDURE — G0299 HHS/HOSPICE OF RN EA 15 MIN: HCPCS

## 2017-03-16 PROCEDURE — 80053 COMPREHEN METABOLIC PANEL: CPT

## 2017-03-16 PROCEDURE — A9270 NON-COVERED ITEM OR SERVICE: HCPCS | Performed by: HOSPITALIST

## 2017-03-16 PROCEDURE — 99291 CRITICAL CARE FIRST HOUR: CPT

## 2017-03-16 PROCEDURE — 83605 ASSAY OF LACTIC ACID: CPT

## 2017-03-16 PROCEDURE — 700101 HCHG RX REV CODE 250: Performed by: HOSPITALIST

## 2017-03-16 PROCEDURE — 87502 INFLUENZA DNA AMP PROBE: CPT

## 2017-03-16 PROCEDURE — 99291 CRITICAL CARE FIRST HOUR: CPT | Performed by: HOSPITALIST

## 2017-03-16 PROCEDURE — 94640 AIRWAY INHALATION TREATMENT: CPT

## 2017-03-16 PROCEDURE — 700111 HCHG RX REV CODE 636 W/ 250 OVERRIDE (IP): Performed by: EMERGENCY MEDICINE

## 2017-03-16 PROCEDURE — 770022 HCHG ROOM/CARE - ICU (200)

## 2017-03-16 PROCEDURE — 700102 HCHG RX REV CODE 250 W/ 637 OVERRIDE(OP): Performed by: HOSPITALIST

## 2017-03-16 PROCEDURE — 304562 HCHG STAT O2 MASK/CANNULA

## 2017-03-16 PROCEDURE — 87040 BLOOD CULTURE FOR BACTERIA: CPT

## 2017-03-16 PROCEDURE — 87503 INFLUENZA DNA AMP PROB ADDL: CPT

## 2017-03-16 PROCEDURE — 700111 HCHG RX REV CODE 636 W/ 250 OVERRIDE (IP): Performed by: HOSPITALIST

## 2017-03-16 PROCEDURE — 96361 HYDRATE IV INFUSION ADD-ON: CPT

## 2017-03-16 PROCEDURE — 81001 URINALYSIS AUTO W/SCOPE: CPT

## 2017-03-16 PROCEDURE — 700111 HCHG RX REV CODE 636 W/ 250 OVERRIDE (IP)

## 2017-03-16 PROCEDURE — 87086 URINE CULTURE/COLONY COUNT: CPT

## 2017-03-16 PROCEDURE — 85007 BL SMEAR W/DIFF WBC COUNT: CPT

## 2017-03-16 PROCEDURE — 665999 HH PPS REVENUE DEBIT

## 2017-03-16 RX ORDER — ACETAMINOPHEN 325 MG/1
650 TABLET ORAL EVERY 6 HOURS PRN
Status: DISCONTINUED | OUTPATIENT
Start: 2017-03-16 | End: 2017-03-20 | Stop reason: HOSPADM

## 2017-03-16 RX ORDER — ZIPRASIDONE HYDROCHLORIDE 20 MG/1
160 CAPSULE ORAL EVERY EVENING
Status: DISCONTINUED | OUTPATIENT
Start: 2017-03-16 | End: 2017-03-20 | Stop reason: HOSPADM

## 2017-03-16 RX ORDER — SODIUM CHLORIDE 9 MG/ML
500 INJECTION, SOLUTION INTRAVENOUS
Status: DISCONTINUED | OUTPATIENT
Start: 2017-03-16 | End: 2017-03-20 | Stop reason: HOSPADM

## 2017-03-16 RX ORDER — FENTANYL 25 UG/1
1 PATCH TRANSDERMAL
Status: DISCONTINUED | OUTPATIENT
Start: 2017-03-16 | End: 2017-03-20 | Stop reason: HOSPADM

## 2017-03-16 RX ORDER — ONDANSETRON 2 MG/ML
4 INJECTION INTRAMUSCULAR; INTRAVENOUS EVERY 4 HOURS PRN
Status: DISCONTINUED | OUTPATIENT
Start: 2017-03-16 | End: 2017-03-20 | Stop reason: HOSPADM

## 2017-03-16 RX ORDER — LEVOTHYROXINE SODIUM 0.07 MG/1
75 TABLET ORAL
Status: DISCONTINUED | OUTPATIENT
Start: 2017-03-17 | End: 2017-03-20 | Stop reason: HOSPADM

## 2017-03-16 RX ORDER — SODIUM CHLORIDE 9 MG/ML
30 INJECTION, SOLUTION INTRAVENOUS
Status: DISCONTINUED | OUTPATIENT
Start: 2017-03-16 | End: 2017-03-20 | Stop reason: HOSPADM

## 2017-03-16 RX ORDER — ONDANSETRON 4 MG/1
4 TABLET, ORALLY DISINTEGRATING ORAL EVERY 4 HOURS PRN
Status: DISCONTINUED | OUTPATIENT
Start: 2017-03-16 | End: 2017-03-20 | Stop reason: HOSPADM

## 2017-03-16 RX ORDER — GABAPENTIN 600 MG/1
600-1200 TABLET ORAL 2 TIMES DAILY
Status: DISCONTINUED | OUTPATIENT
Start: 2017-03-16 | End: 2017-03-16

## 2017-03-16 RX ORDER — OXYCODONE HYDROCHLORIDE 5 MG/1
5 TABLET ORAL EVERY 4 HOURS PRN
Status: DISCONTINUED | OUTPATIENT
Start: 2017-03-16 | End: 2017-03-20 | Stop reason: HOSPADM

## 2017-03-16 RX ORDER — PROMETHAZINE HYDROCHLORIDE 25 MG/1
12.5-25 TABLET ORAL EVERY 4 HOURS PRN
Status: DISCONTINUED | OUTPATIENT
Start: 2017-03-16 | End: 2017-03-20 | Stop reason: HOSPADM

## 2017-03-16 RX ORDER — SODIUM CHLORIDE 9 MG/ML
30 INJECTION, SOLUTION INTRAVENOUS ONCE
Status: COMPLETED | OUTPATIENT
Start: 2017-03-16 | End: 2017-03-16

## 2017-03-16 RX ORDER — FLUOXETINE 10 MG/1
10 CAPSULE ORAL DAILY
Status: DISCONTINUED | OUTPATIENT
Start: 2017-03-17 | End: 2017-03-20 | Stop reason: HOSPADM

## 2017-03-16 RX ORDER — GABAPENTIN 300 MG/1
600 CAPSULE ORAL EVERY MORNING
Status: DISCONTINUED | OUTPATIENT
Start: 2017-03-17 | End: 2017-03-20 | Stop reason: HOSPADM

## 2017-03-16 RX ORDER — AMOXICILLIN 250 MG
2 CAPSULE ORAL 2 TIMES DAILY
Status: DISCONTINUED | OUTPATIENT
Start: 2017-03-16 | End: 2017-03-20 | Stop reason: HOSPADM

## 2017-03-16 RX ORDER — POLYETHYLENE GLYCOL 3350 17 G/17G
1 POWDER, FOR SOLUTION ORAL
Status: DISCONTINUED | OUTPATIENT
Start: 2017-03-16 | End: 2017-03-20 | Stop reason: HOSPADM

## 2017-03-16 RX ORDER — OXYCODONE HYDROCHLORIDE 10 MG/1
10 TABLET ORAL EVERY 4 HOURS PRN
Status: DISCONTINUED | OUTPATIENT
Start: 2017-03-16 | End: 2017-03-20 | Stop reason: HOSPADM

## 2017-03-16 RX ORDER — ATORVASTATIN CALCIUM 40 MG/1
40 TABLET, FILM COATED ORAL
Status: DISCONTINUED | OUTPATIENT
Start: 2017-03-16 | End: 2017-03-20 | Stop reason: HOSPADM

## 2017-03-16 RX ORDER — GABAPENTIN 400 MG/1
1200 CAPSULE ORAL EVERY EVENING
Status: DISCONTINUED | OUTPATIENT
Start: 2017-03-16 | End: 2017-03-20 | Stop reason: HOSPADM

## 2017-03-16 RX ORDER — SODIUM CHLORIDE AND POTASSIUM CHLORIDE 150; 900 MG/100ML; MG/100ML
INJECTION, SOLUTION INTRAVENOUS CONTINUOUS
Status: DISCONTINUED | OUTPATIENT
Start: 2017-03-16 | End: 2017-03-18

## 2017-03-16 RX ORDER — PROMETHAZINE HYDROCHLORIDE 25 MG/1
12.5-25 SUPPOSITORY RECTAL EVERY 4 HOURS PRN
Status: DISCONTINUED | OUTPATIENT
Start: 2017-03-16 | End: 2017-03-20 | Stop reason: HOSPADM

## 2017-03-16 RX ORDER — BISACODYL 10 MG
10 SUPPOSITORY, RECTAL RECTAL
Status: DISCONTINUED | OUTPATIENT
Start: 2017-03-16 | End: 2017-03-20 | Stop reason: HOSPADM

## 2017-03-16 RX ADMIN — CEFEPIME HYDROCHLORIDE 2 G: 2 INJECTION, POWDER, FOR SOLUTION INTRAVENOUS at 23:10

## 2017-03-16 RX ADMIN — GABAPENTIN 1200 MG: 400 CAPSULE ORAL at 21:38

## 2017-03-16 RX ADMIN — ALBUTEROL SULFATE 2.5 MG: 2.5 SOLUTION RESPIRATORY (INHALATION) at 16:58

## 2017-03-16 RX ADMIN — ENOXAPARIN SODIUM 40 MG: 100 INJECTION SUBCUTANEOUS at 16:44

## 2017-03-16 RX ADMIN — OXYCODONE HYDROCHLORIDE 5 MG: 5 TABLET ORAL at 18:26

## 2017-03-16 RX ADMIN — CEFEPIME 2 G: 2 INJECTION, POWDER, FOR SOLUTION INTRAVENOUS at 14:36

## 2017-03-16 RX ADMIN — POTASSIUM CHLORIDE AND SODIUM CHLORIDE: 900; 150 INJECTION, SOLUTION INTRAVENOUS at 16:17

## 2017-03-16 RX ADMIN — FENTANYL 1 PATCH: 25 PATCH TRANSDERMAL at 16:46

## 2017-03-16 RX ADMIN — POTASSIUM CHLORIDE AND SODIUM CHLORIDE: 900; 150 INJECTION, SOLUTION INTRAVENOUS at 16:48

## 2017-03-16 RX ADMIN — PROCHLORPERAZINE EDISYLATE 10 MG: 5 INJECTION INTRAMUSCULAR; INTRAVENOUS at 18:37

## 2017-03-16 RX ADMIN — SODIUM CHLORIDE 3507 ML: 9 INJECTION, SOLUTION INTRAVENOUS at 12:17

## 2017-03-16 RX ADMIN — VANCOMYCIN HYDROCHLORIDE 2900 MG: 10 INJECTION, POWDER, LYOPHILIZED, FOR SOLUTION INTRAVENOUS at 16:50

## 2017-03-16 RX ADMIN — ZIPRASIDONE HYDROCHLORIDE 160 MG: 20 CAPSULE ORAL at 17:11

## 2017-03-16 RX ADMIN — PROCHLORPERAZINE EDISYLATE 10 MG: 5 INJECTION INTRAMUSCULAR; INTRAVENOUS at 14:36

## 2017-03-16 RX ADMIN — SENNOSIDES AND DOCUSATE SODIUM 2 TABLET: 8.6; 5 TABLET ORAL at 16:45

## 2017-03-16 RX ADMIN — ATORVASTATIN CALCIUM 40 MG: 40 TABLET, FILM COATED ORAL at 21:38

## 2017-03-16 SDOH — ECONOMIC STABILITY: HOUSING INSECURITY: UNSAFE APPLIANCES: 0

## 2017-03-16 SDOH — ECONOMIC STABILITY: HOUSING INSECURITY: UNSAFE COOKING RANGE AREA: 0

## 2017-03-16 ASSESSMENT — LIFESTYLE VARIABLES
EVER_SMOKED: NEVER
EVER_SMOKED: NEVER
ALCOHOL_USE: NO

## 2017-03-16 ASSESSMENT — PAIN SCALES - GENERAL
PAINLEVEL_OUTOF10: 4
PAINLEVEL_OUTOF10: 8

## 2017-03-16 ASSESSMENT — ENCOUNTER SYMPTOMS
MENTAL STATUS CHANGE: 0
ADDITIONAL INFORMATION: GENERALIZED PAIN
VOMITING: DENIES ANY EMESIS
DEBILITATING PAIN: 1
SHORTNESS OF BREATH: T

## 2017-03-16 ASSESSMENT — PATIENT HEALTH QUESTIONNAIRE - PHQ9
SUM OF ALL RESPONSES TO PHQ QUESTIONS 1-9: 0
1. LITTLE INTEREST OR PLEASURE IN DOING THINGS: NOT AT ALL
SUM OF ALL RESPONSES TO PHQ9 QUESTIONS 1 AND 2: 0
2. FEELING DOWN, DEPRESSED, IRRITABLE, OR HOPELESS: NOT AT ALL

## 2017-03-16 NOTE — ED NOTES
Med rec complete per patient  Allergies reviewed    Per patient she is on a 7 day course of Cefdinir for Sepsis

## 2017-03-16 NOTE — ED NOTES
"Pt states recent d/c from [facility]  For sepsis. Also states has implanted \"bowel stimulator\" . Pt states left sied \"deep\" chest pain also when questioned.  "

## 2017-03-16 NOTE — ED PROVIDER NOTES
"ED Provider Note    CHIEF COMPLAINT  Chief Complaint   Patient presents with   • N/V   • Nasal Congestion   • Cough   • Shortness of Breath   • Chest Pain       HPI  Kristin Balderrama is a 26 y.o. female who presents to the Emergency Department with a history of scoliosis, the patient does have chronic pain and has had lumbar surgery in April 2015. As well as 2012. She states that she has urinary incontinence for \"I don't know for how long he may be a year, because she just couldn't\" hold her urine. She's unclear why. She at this time is paraplegic of unclear etiology. The patient was recently admitted to the hospital for sepsis which was felt to be from a urinary source and was discharged home on antibiotics, she is currently on cefdinir. She comes into the emergency department today stating that yesterday she started with shortness of breath congestion nausea and vomiting. She feels somewhat short of breath. She denies high fevers. She denies diarrhea.  She was supposed to receive IV steroids today at the infusion center but could not go because she might be \"infectious\"        REVIEW OF SYSTEMS  .  As above all other systems are negative.    PAST MEDICAL HISTORY   has a past medical history of Scoliosis; Multiple personality disorder; Chronic UTI (9/18/2010); Heart burn; Pain (08-15-12); History of falling; Sinus tachycardia (10/31/2013); Urinary incontinence; Hypertension; Disorder of thyroid; Obesity; Pneumonia (2012); ASTHMA; Breath shortness; Anginal syndrome; Psychosis; Arthritis; PCOS (polycystic ovarian syndrome); Gynecological disorder; Renal disorder; Arrhythmia; Urinary bladder disorder; Tuberculosis; Sleep apnea; and 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CMS-[facility]).    FAMILY HISTORY  Family History   Problem Relation Age of Onset   • Hypertension Mother    • Sleep Apnea Mother    • Heart Disease Mother    • Other Mother      hypothryod   • Hypertension Maternal Uncle    • Heart Disease Maternal Grandmother    • " "Hypertension Maternal Grandmother    • Other Sister      Narcolepsy;fibromyalsia;bone;nerve   • Genitourinary () Sister      endometriosis        SOCIAL HISTORY  Social History     Social History Main Topics   • Smoking status: Passive Smoke Exposure - Never Smoker     Types: Cigarettes   • Smokeless tobacco: Never Used   • Alcohol Use: No   • Drug Use: No   • Sexual Activity: Not Currently     Birth Control/ Protection: Pill       SURGICAL HISTORY   has past surgical history that includes neuro dest facet l/s w/ig sngl (4/21/2015); recovery (1/27/2016); delmar by laparoscopy (8/29/2010); lumbar fusion anterior (8/21/2012); other cardiac surgery (1/2016); tonsillectomy; bowel stimulator insertion (7/13/2016); gastroscopy with balloon dilatation (N/A, 1/4/2017); and muscle biopsy (Right, 1/26/2017).    CURRENT MEDICATIONS  Reviewed.  See Encounter Summary.  Include cefdinir    ALLERGIES  Allergies   Allergen Reactions   • Cefdinir Shortness of Breath and Itching     Tolerated 1/18/17   • Depakote [Divalproex Sodium] Unspecified     Muscle spasms/muscle pain and weakness     • Abilify Unspecified     Headaches/muscle twitching     • Amitriptyline Unspecified     Headaches     • Amoxicillin Rash     Pt states \"I get a rash\".     • Ciprofloxacin Rash     Pt states \"I get a rash\".     • Clindamycin Nausea     Even with food     • Doxycycline Vomiting and Nausea     RXN=unknown   • Ees [Erythromycin] Vomiting and Nausea   • Flagyl [Metronidazole Hcl] Unspecified     \"eye problems\"     • Flomax [Tamsulosin Hydrochloride] Swelling   • Metformin Unspecified     Increased lactic acid      • Sulfa Drugs Hives and Rash     RXN=since childhood   • Tape Rash     Tears skin off   coban with Tegaderm tape ok  RXN=ongoing   • Vancomycin Itching     Pt becomes flushed in face and chest.   RXN=7/10/16   • Cephalexin [Keflex] Rash     Pt states she gets a rash with this medication   • Levofloxacin Unspecified     Leg muscle cramps " "      PHYSICAL EXAM  VITAL SIGNS: /92 mmHg  Pulse 105  Temp(Src) 37.4 °C (99.4 °F)  Resp 26  Ht 1.6 m (5' 3\")  Wt 116.9 kg (257 lb 11.5 oz)  BMI 45.66 kg/m2  SpO2 97%  LMP 07/16/2016  Constitutional: Alert, able to answer questions  HENT: Nose is normal in appearance, external ears are normal,  moist mucous membranes  Eyes: Anicteric,  pupils are equal round and reactive, there is no conjunctival drainage or pallor   Neck: The trachea is midline, there is no obvious mass or meningeal signs  Cardiovascular: Good perfusion, *regular rate and rhythm without murmurs gallops or rubs  Thorax & Lungs: Respiratory rate and effort are normal. There is normal chest excursion with respiration.  No wheezes rhonchi or rales noted.  Abdomen: Abdomen is normal in appearance, no gross peritoneal signs, obese, normal bowel sounds, no pain with cough  : Suprapubic cath in place  Musculoskeletal: No deformities noted in all 4 extremities.   Skin: Visualized skin is warm without rash.  Psychiatric: Normal mood and mentation    RADIOLOGY/PROCEDURES  Imaging Studies:    DX-CHEST-PORTABLE (1 VIEW)   Final Result      No radiographic evidence of acute cardiopulmonary process.            Pertinent Labs   Results for orders placed or performed during the hospital encounter of 03/16/17   CBC WITH DIFFERENTIAL   Result Value Ref Range    WBC 5.4 4.8 - 10.8 K/uL    RBC 3.91 (L) 4.20 - 5.40 M/uL    Hemoglobin 11.9 (L) 12.0 - 16.0 g/dL    Hematocrit 34.8 (L) 37.0 - 47.0 %    MCV 89.0 81.4 - 97.8 fL    MCH 30.4 27.0 - 33.0 pg    MCHC 34.2 33.6 - 35.0 g/dL    RDW 45.4 35.9 - 50.0 fL    Platelet Count 146 (L) 164 - 446 K/uL    MPV 11.6 9.0 - 12.9 fL    Nucleated RBC 0.00 /100 WBC    NRBC (Absolute) 0.00 K/uL    Neutrophils-Polys 63.00 44.00 - 72.00 %    Lymphocytes 25.00 22.00 - 41.00 %    Monocytes 8.00 0.00 - 13.40 %    Eosinophils 3.00 0.00 - 6.90 %    Basophils 0.00 0.00 - 1.80 %    Neutrophils (Absolute) 3.46 2.00 - 7.15 K/uL    " Lymphs (Absolute) 1.35 1.00 - 4.80 K/uL    Monos (Absolute) 0.43 0.00 - 0.85 K/uL    Eos (Absolute) 0.16 0.00 - 0.51 K/uL    Baso (Absolute) 0.00 0.00 - 0.12 K/uL   COMP METABOLIC PANEL   Result Value Ref Range    Sodium 136 135 - 145 mmol/L    Potassium 3.3 (L) 3.6 - 5.5 mmol/L    Chloride 98 96 - 112 mmol/L    Co2 24 20 - 33 mmol/L    Anion Gap 14.0 (H) 0.0 - 11.9    Glucose 229 (H) 65 - 99 mg/dL    Bun 10 8 - 22 mg/dL    Creatinine 0.85 0.50 - 1.40 mg/dL    Calcium 9.2 8.4 - 10.2 mg/dL    AST(SGOT) 59 (H) 12 - 45 U/L    ALT(SGPT) 78 (H) 2 - 50 U/L    Alkaline Phosphatase 58 30 - 99 U/L    Total Bilirubin 1.0 0.1 - 1.5 mg/dL    Albumin 3.9 3.2 - 4.9 g/dL    Total Protein 7.0 6.0 - 8.2 g/dL    Globulin 3.1 1.9 - 3.5 g/dL    A-G Ratio 1.3 g/dL   LACTIC ACID   Result Value Ref Range    Lactic Acid 4.18 (HH) 0.50 - 2.00 mmol/L    Specimen Venous    INFLUENZA RAPID   Result Value Ref Range    Significant Indicator NEG     Source RESP     Site      Rapid Influenza A-B       Negative for Influenza A and Influenza B antigens.  Infection due to influenza A or B cannot be ruled out  since the antigen present in the specimen may be below the  detection limit of the test. Culture confirmation of  negative samples is recommended.     ESTIMATED GFR   Result Value Ref Range    GFR If African American >60 >60 mL/min/1.73 m 2    GFR If Non African American >60 >60 mL/min/1.73 m 2   DIFFERENTIAL MANUAL   Result Value Ref Range    Bands-Stabs 1.00 0.00 - 10.00 %    Manual Diff Status PERFORMED      *Note: Due to a large number of results and/or encounters for the requested time period, some results have not been displayed. A complete set of results can be found in Results Review.           COURSE & MEDICAL DECISION MAKING  Nursing notes and vital signs were reviewed. (See chart for details)  The patients  records were reviewed, history was obtained from the patient ;     The patient presents with shortness of breath vomiting nasal  congestion, and the differential diagnosis includes but is not limited to viral syndrome, influenza, sepsis.       Initial orders in the Emergency Department included CBC CMP urine rapid flu chest x-ray and initial treatment in the Emergency Department included sepsis fluid dosing and the patient received IV 30 mL/kg    ED testing reveals lactic acid elevated at 4.18. She is not hypotensive no central line has been placed. I discussed her care with the hospitalist Dr. Mckeon. She has been on multiple recent antibiotics has an unclear source at this time and is not febrile I will defer to him for antibiotic choice    FINAL IMPRESSION  1. Lactic acidosis       DISPOSITION  Admit      Electronically signed by: La Virk, 3/16/2017 1:08 PM

## 2017-03-16 NOTE — IP AVS SNAPSHOT
" <p align=\"LEFT\"><IMG SRC=\"//[email or web address]/blob$/[email or web address]\" alt=\"Image\" WIDTH=\"50%\" HEIGHT=\"200\" BORDER=\"\"></p>                   Name:Kristin Balderrama  Medical Record Number:[id number]  CSN: [id number]    YOB: 1990   Age: 26 y.o.  Sex: female  HT:1.6 m (5' 3\") WT: 116.9 kg (257 lb 11.5 oz)          Admit Date: 3/16/2017     Discharge Date:   Today's Date: 3/20/2017  Attending Doctor:  Floridalma Baxter D.O.                  Allergies:  Cefdinir; Depakote; Abilify; Amitriptyline; Amoxicillin; Ciprofloxacin; Clindamycin; Doxycycline; Ees; Flagyl; Flomax; Metformin; Sulfa drugs; Tape; Vancomycin; Cephalexin; and Levofloxacin          Your appointments     Mar 23, 2017 10:20 AM   Established Patient with Torres Brody M.D.   [facility] [address]    [address]  [address]  [facility] [id number] [phone]           You will be receiving a confirmation call a few days before your appointment from our automated call confirmation system.            Mar 24, 2017 10:30 AM   NEW MISC Infusion 2 HR with RN 4   [facility] ([facility])    [address] [place] [address] [address]  [facility] [id number] [phone]            Apr 11, 2017  2:40 PM   Follow Up Visit with Sandoval Fox M.D.   [facility] (--)    [address], [address]  [facility] [id number] [phone]           You will be receiving a confirmation call a few days before your appointment from our automated call confirmation system.            Apr 12, 2017  9:00 AM   Individual Therapy with Blanca Brantley L.C.S.W.   [facility]    [address]  [address]  [facility] [id number] [phone]            May 01, 2017  9:30 AM   Follow Up Med Management with Ronny Burnette M.D.   [facility] [address] [place] [address] ([facility])    [address] [place] [address]   [phone]            May 03, 2017  3:40 PM   Follow Up Visit with Sandoval Fox M.D.   [facility] " (--)    [address], [address]  [place] [id number] [phone]           You will be receiving a confirmation call a few days before your appointment from our automated call confirmation system.            May 16, 2017  1:20 PM   New Patient with Chencho Norman M.D.   [facility] (--)    [address]  [address]  [place] [id number] [phone]           Please bring enclosed paperwork completed along with your insurance card and photo ID.            May 31, 2017  9:00 AM   Individual Therapy with Blanca Brantley L.C.S.W.   [facility] ([facility])    [address] [facility] [address] [facility] [address]  [address]  [place] [id number] [phone]            Jul 19, 2017  8:40 AM   FOLLOW UP with Torres Kumar M.D.   [facility] (--)    [address]  [place] [id number] [phone]                 Medication List      Take these Medications        Instructions    albuterol 108 (90 BASE) MCG/ACT Aers inhalation aerosol    Inhale 2 Puffs by mouth every 6 hours as needed for Shortness of Breath.   Dose:  2 Puff       aspirin EC 81 MG Tbec   Commonly known as:  ECOTRIN    Take 1 Tab by mouth every day.   Dose:  81 mg       atorvastatin 40 MG Tabs   Commonly known as:  LIPITOR    Take 1 Tab by mouth every bedtime.   Dose:  40 mg       ciprofloxacin 500 MG Tabs   Commonly known as:  CIPRO    Take 1 Tab by mouth every 12 hours.   Dose:  500 mg       CORLANOR 5 MG Tabs tablet   Generic drug:  ivabradine    Take 5 mg by mouth 2 times a day, with meals.   Dose:  5 mg       Cranberry 1000 MG Caps    Take 1 Cap by mouth 2 times a day, with meals.   Dose:  1 Cap       cyclobenzaprine 10 MG Tabs   Commonly known as:  FLEXERIL    Take 10 mg by mouth 2 Times a Day. Indications: Muscle Spasm   Dose:  10 mg       fentanyl 25 MCG/HR Pt72   Commonly known as:  DURAGESIC    Apply 1 Patch to skin as directed every 72 hours.   Dose:  1 Patch       fluoxetine 10 MG Caps      Commonly known as:  PROZAC    Take 1 Cap by mouth every day.   Dose:  10 mg       gabapentin 600 MG tablet   Commonly known as:  NEURONTIN    Take 600-1,200 mg by mouth 2 times a day. 600 mg AM  1200 mg PM   Dose:  600-1200 mg       HM VITAMIN B12 500 MCG tablet   Generic drug:  cyanocobalamin    Take 1,000 mcg by mouth 2 times a day.   Dose:  1000 mcg       lactulose 10 GM/15ML Soln    Take 10 g by mouth 2 times a day.   Dose:  10 g       levothyroxine 75 MCG Tabs   Commonly known as:  SYNTHROID    Take 75 mcg by mouth Every morning on an empty stomach.   Dose:  75 mcg       Melatonin 5 MG Tabs    Doctor's comments:  Takes two tabs at Bedtime (10 mg.)   Take 10 mg by mouth every bedtime.   Dose:  10 mg       nystatin Powd   Commonly known as:  MYCOSTATIN    Apply 1 Application to affected area(s) 3 times a day.   Dose:  1 Application       omeprazole 20 MG delayed-release capsule   Commonly known as:  PRILOSEC    Take 20 mg by mouth 2 times a day.   Dose:  20 mg       oxycodone immediate-release 5 MG Tabs   Commonly known as:  ROXICODONE    Take 1 Tab by mouth every four hours as needed.   Dose:  5 mg       promethazine 25 MG Tabs   Commonly known as:  PHENERGAN    Take 1 Tab by mouth every 6 hours as needed for Nausea/Vomiting.   Dose:  25 mg       sodium bicarbonate 325 MG Tabs    Take 2 Tabs by mouth 3 times a day.   Dose:  650 mg       vitamin D (Ergocalciferol) 97998 UNITS Caps capsule   Commonly known as:  DRISDOL    Take 50,000 Units by mouth every Friday.   Dose:  89513 Units       ziprasidone 80 MG Caps   Commonly known as:  GEODON    Take 160 mg by mouth every evening. DO NOT GIVE PAST 1700   Dose:  160 mg

## 2017-03-16 NOTE — ED NOTES
Edwin from Lab called with critical result of lactic 4.18 at 1125  Critical lab result read back to Edwin.   Dr. Virk notified of critical lab result at 1230  Critical lab result read back by Dr. Virk

## 2017-03-16 NOTE — IP AVS SNAPSHOT
" Home Care Instructions                                                                                                                  Name:Kristin Balderrama  Medical Record Number:[id number]  CSN: [id number]    YOB: 1990   Age: 26 y.o.  Sex: female  HT:1.6 m (5' 3\") WT: 116.9 kg (257 lb 11.5 oz)          Admit Date: 3/16/2017     Discharge Date:   Today's Date: 3/20/2017  Attending Doctor:  Floridalma Baxter D.O.                  Allergies:  Cefdinir; Depakote; Abilify; Amitriptyline; Amoxicillin; Ciprofloxacin; Clindamycin; Doxycycline; Ees; Flagyl; Flomax; Metformin; Sulfa drugs; Tape; Vancomycin; Cephalexin; and Levofloxacin            Discharge Instructions       Discharge Instructions    Discharged to group home by medical transportation with escort. Discharged via wheelchair, hospital escort: Yes.  Special equipment needed: Not Applicable    Be sure to schedule a follow-up appointment with your primary care doctor or any specialists as instructed.     Discharge Plan:   Diet Plan: Discussed  Activity Level: Discussed  Confirmed Follow up Appointment: Appointment Scheduled  Confirmed Symptoms Management: Discussed  Medication Reconciliation Updated: Yes  Influenza Vaccine Indication: Not indicated: Previously immunized this influenza season and > 8 years of age    I understand that a diet low in cholesterol, fat, and sodium is recommended for good health. Unless I have been given specific instructions below for another diet, I accept this instruction as my diet prescription.   Other diet: Regular diet    Special Instructions: None    · Is patient discharged on Warfarin / Coumadin?   No     · Is patient Post Blood Transfusion?  No    Depression / Suicide Risk    As you are discharged from this [facility] facility, it is important to learn how to keep safe from harming yourself.    Recognize the warning signs:  · Abrupt changes in personality, positive or negative- including increase in energy "   · Giving away possessions  · Change in eating patterns- significant weight changes-  positive or negative  · Change in sleeping patterns- unable to sleep or sleeping all the time   · Unwillingness or inability to communicate  · Depression  · Unusual sadness, discouragement and loneliness  · Talk of wanting to die  · Neglect of personal appearance   · Rebelliousness- reckless behavior  · Withdrawal from people/activities they love  · Confusion- inability to concentrate     If you or a loved one observes any of these behaviors or has concerns about self-harm, here's what you can do:  · Talk about it- your feelings and reasons for harming yourself  · Remove any means that you might use to hurt yourself (examples: pills, rope, extension cords, firearm)  · Get professional help from the community (Mental Health, Substance Abuse, psychological counseling)  · Do not be alone:Call your Safe Contact- someone whom you trust who will be there for you.  · Call your local CRISIS HOTLINE 690-9974 or 998-453-9764  · Call your local Children's Mobile Crisis Response Team [place] (352) 133-4339 or wwwCorsair  · Call the toll free National Suicide Prevention Hotlines   · National Suicide Prevention Lifeline 717-483-RPTH (8012)  · National Hope Line Network 800-SUICIDE (817-7450)    Septic Shock  Septic shock is the final, most serious stage of the body's inflammatory response to an infection (sepsis). Sepsis happens when the chemicals that are produced by your body to fight infection cause inflammation through your entire body (systemic). This can lead to problems with your blood pressure that prevent your organs from getting the oxygen they need. Septic shock results when your organs begin to fail from the drop in blood pressure.  Infections that lead to sepsis often begin in the lungs, abdomen, urinary tract, reproductive system, or digestive system.  CAUSES  Septic shock can result from any bacterial, fungal, or  viral infection in the body. Septic shock from a viral infection is rare.  RISK FACTORS  You may be more likely to develop septic shock from an infection if you:  · Are very young or very old.  · Have AIDS or another disease that weakens your body's defense system (immune system).  · Have diabetes.  · Have lymphoma or leukemia.  · Have a disease of the lungs, intestines, reproductive system, or urinary tract.  · Have been hospitalized for a long time, especially if you are using a catheter.  · Had a recent surgery or medical procedure.  · Have been taking antibiotic medicines or steroid medicines for a long time.  SIGNS AND SYMPTOMS  Signs and symptoms of septic shock may include:  2. Low blood pressure.  3. A rapid heart rate or heart palpitations.  4. Shortness of breath.  5. Rash or discolored skin.  6. A very high or very low body temperature with chills.  7. Reduced urine output.  8. Feeling lightheaded, weak, or confused.  9. Agitation or restlessness.  DIAGNOSIS  Your health care provider can diagnose septic shock based on your symptoms and your medical history. Your health care provider will also perform a physical exam. Tests that will be done to confirm the diagnosis may include:  2. Tests to check for infection by trying to grow (culture) bacteria from samples of:  1. Blood.  2. Urine.  3. Brain and spinal fluid (cerebrospinal fluid or CSF).  4. Mucus.  5. Wound secretions.  3. Imaging tests, such as:  1. X-rays.  2. Ultrasound.  3. CT scans.  4. MRI.  TREATMENT  Septic shock is a medical emergency. Treatment takes place in a hospital, usually in the intensive care unit (ICU). You may be given antibiotics through an IV tube immediately. Other possible treatments include:  2. Medicine to increase blood pressure (vasopressor medicines).  3. Steroids to reduce inflammation.  4. IV fluids to help with symptoms of dehydration.  5. Respirators or breathing machines to support breathing.  6. Insulin to control  blood sugar.  7. Surgery to clean wounds or remove infected or dead tissue. This is needed in some cases.  8. Dialysis.  SEEK IMMEDIATE MEDICAL CARE IF:  Septic shock is a serious problem that is a medical emergency. Do not wait to see if the symptoms will go away. Get medical help right away. Call your local emergency services (911 in the [place] Do not drive yourself to the hospital.     This information is not intended to replace advice given to you by your health care provider. Make sure you discuss any questions you have with your health care provider.     Document Released: 08/21/2015 Document Reviewed: 08/21/2015  [employer] Interactive Patient Education ©2016 [employer]      Your appointments     Mar 23, 2017 10:20 AM   Established Patient with Torres Brody M.D.   [facility] [address]    [address]  [address]  [facility] [address]   [phone]           You will be receiving a confirmation call a few days before your appointment from our automated call confirmation system.            Mar 24, 2017 10:30 AM   NEW MISC Infusion 2 HR with RN 4   [facility] ([facility])    [address] [facility] [address]  [facility] [address]   [phone]            Apr 11, 2017  2:40 PM   Follow Up Visit with Sandoval Fox M.D.   [facility]rology (--)    [address], [address]  [facility] [address] [phone]           You will be receiving a confirmation call a few days before your appointment from our automated call confirmation system.            Apr 12, 2017  9:00 AM   Individual Therapy with Blanca Brantley L.C.S.W.   [facility]    [address]  [address]  [place] [id number] [phone]            May 01, 2017  9:30 AM   Follow Up Med Management with Ronny Burnette M.D.   [facility] [address]    [address] [facility]  [address]  [facility] [address]   [phone]            May 03, 2017  3:40 PM   Follow Up Visit with Sandoval CRUZ  MANUEL Fox.   [facility] Neurology (--)    [address], [address]  [place] [address] [phone]           You will be receiving a confirmation call a few days before your appointment from our automated call confirmation system.            May 16, 2017  1:20 PM   New Patient with Chencho Norman M.D.   [facility] Sleep Medicine (--)    [address]  [place] [address] [phone]           Please bring enclosed paperwork completed along with your insurance card and photo ID.            May 31, 2017  9:00 AM   Individual Therapy with Blanca Brantley L.C.S.W.   [facility] ([facility])    [address]  [address]  [place] [id number] [phone]            Jul 19, 2017  8:40 AM   FOLLOW UP with Torres Kumar M.D.   [facility] (--)    [address] [place] [address] [address]  [place] [address] [phone]                 Discharge Medication Instructions:    Below are the medications your physician expects you to take upon discharge:    Review all your home medications and newly ordered medications with your doctor and/or pharmacist. Follow medication instructions as directed by your doctor and/or pharmacist.    Please keep your medication list with you and share with your physician.               Medication List      START taking these medications        Instructions    ciprofloxacin 500 MG Tabs   Last time this was given:  500 mg on 3/20/2017  8:18 AM   Commonly known as:  CIPRO    Take 1 Tab by mouth every 12 hours.   Dose:  500 mg       oxycodone immediate-release 5 MG Tabs   Last time this was given:  10 mg on 3/20/2017 10:22 AM   Commonly known as:  ROXICODONE    Take 1 Tab by mouth every four hours as needed.   Dose:  5 mg         CONTINUE taking these medications        Instructions    albuterol 108 (90 BASE) MCG/ACT Aers inhalation aerosol    Inhale 2 Puffs by mouth every 6 hours as needed for Shortness of Breath.   Dose:   2 Puff       aspirin EC 81 MG Tbec   Commonly known as:  ECOTRIN    Take 1 Tab by mouth every day.   Dose:  81 mg       atorvastatin 40 MG Tabs   Last time this was given:  40 mg on 3/19/2017  9:36 PM   Commonly known as:  LIPITOR    Take 1 Tab by mouth every bedtime.   Dose:  40 mg       CORLANOR 5 MG Tabs tablet   Last time this was given:  5 mg on 3/20/2017  7:30 AM   Generic drug:  ivabradine    Take 5 mg by mouth 2 times a day, with meals.   Dose:  5 mg       Cranberry 1000 MG Caps    Take 1 Cap by mouth 2 times a day, with meals.   Dose:  1 Cap       cyclobenzaprine 10 MG Tabs   Commonly known as:  FLEXERIL    Take 10 mg by mouth 2 Times a Day. Indications: Muscle Spasm   Dose:  10 mg       fentanyl 25 MCG/HR Pt72   Last time this was given:  1 Patch on 3/19/2017  2:37 PM   Commonly known as:  DURAGESIC    Apply 1 Patch to skin as directed every 72 hours.   Dose:  1 Patch       fluoxetine 10 MG Caps   Last time this was given:  10 mg on 3/20/2017  8:18 AM   Commonly known as:  PROZAC    Take 1 Cap by mouth every day.   Dose:  10 mg       gabapentin 600 MG tablet   Commonly known as:  NEURONTIN    Take 600-1,200 mg by mouth 2 times a day. 600 mg AM  1200 mg PM   Dose:  600-1200 mg       HM VITAMIN B12 500 MCG tablet   Generic drug:  cyanocobalamin    Take 1,000 mcg by mouth 2 times a day.   Dose:  1000 mcg       lactulose 10 GM/15ML Soln   Last time this was given:  30 mL on 3/20/2017  8:18 AM    Take 10 g by mouth 2 times a day.   Dose:  10 g       levothyroxine 75 MCG Tabs   Last time this was given:  75 mcg on 3/20/2017  6:21 AM   Commonly known as:  SYNTHROID    Take 75 mcg by mouth Every morning on an empty stomach.   Dose:  75 mcg       Melatonin 5 MG Tabs    Doctor's comments:  Takes two tabs at Bedtime (10 mg.)   Take 10 mg by mouth every bedtime.   Dose:  10 mg       nystatin Powd   Last time this was given:  1,500,000 Units on 3/20/2017  8:19 AM   Commonly known as:  MYCOSTATIN    Apply 1 Application  to affected area(s) 3 times a day.   Dose:  1 Application       omeprazole 20 MG delayed-release capsule   Commonly known as:  PRILOSEC    Take 20 mg by mouth 2 times a day.   Dose:  20 mg       promethazine 25 MG Tabs   Last time this was given:  25 mg on 3/19/2017  3:31 PM   Commonly known as:  PHENERGAN    Take 1 Tab by mouth every 6 hours as needed for Nausea/Vomiting.   Dose:  25 mg       sodium bicarbonate 325 MG Tabs    Take 2 Tabs by mouth 3 times a day.   Dose:  650 mg       vitamin D (Ergocalciferol) 33706 UNITS Caps capsule   Commonly known as:  DRISDOL    Take 50,000 Units by mouth every Friday.   Dose:  28808 Units       ziprasidone 80 MG Caps   Last time this was given:  160 mg on 3/19/2017  5:23 PM   Commonly known as:  GEODON    Take 160 mg by mouth every evening. DO NOT GIVE PAST 1700   Dose:  160 mg         STOP taking these medications     cefdinir 300 MG Caps   Commonly known as:  OMNICEF       furosemide 20 MG Tabs   Commonly known as:  LASIX       oxycodone-acetaminophen  MG Tabs   Commonly known as:  PERCOCET-10       ranitidine 150 MG Tabs   Commonly known as:  ZANTAC               Instructions           Diet / Nutrition:    Follow any diet instructions given to you by your doctor or the dietician, including how much salt (sodium) you are allowed each day.    If you are overweight, talk to your doctor about a weight reduction plan.    Activity:    Remain physically active following your doctor's instructions about exercise and activity.    Rest often.     Any time you become even a little tired or short of breath, SIT DOWN and rest.    Worsening Symptoms:    Report any of the following signs and symptoms to the doctor's office immediately:    *Pain of jaw, arm, or neck  *Chest pain not relieved by medication                               *Dizziness or loss of consciousness  *Difficulty breathing even when at rest   *More tired than usual                                       *Bleeding  drainage or swelling of surgical site  *Swelling of feet, ankles, legs or stomach                 *Fever (>100ºF)  *Pink or blood tinged sputum  *Weight gain (3lbs/day or 5lbs /week)           *Shock from internal defibrillator (if applicable)  *Palpitations or irregular heartbeats                *Cool and/or numb extremities    Stroke Awareness    Common Risk Factors for Stroke include:    Age  Atrial Fibrillation  Carotid Artery Stenosis  Diabetes Mellitus  Excessive alcohol consumption  High blood pressure  Overweight   Physical inactivity  Smoking    Warning signs and symptoms of a stroke include:    *Sudden numbness or weakness of the face, arm or leg (especially on one side of the body).  *Sudden confusion, trouble speaking or understanding.  *Sudden trouble seeing in one or both eyes.  *Sudden trouble walking, dizziness, loss of balance or coordination.Sudden severe headache with no known cause.    It is very important to get treatment quickly when a stroke occurs. If you experience any of the above warning signs, call 911 immediately.                   Disclaimer         Quit Smoking / Tobacco Use:    I understand the use of any tobacco products increases my chance of suffering from future heart disease or stroke and could cause other illnesses which may shorten my life. Quitting the use of tobacco products is the single most important thing I can do to improve my health. For further information on smoking / tobacco cessation call a Toll Free Quit Line at 1-417.611.1075 (*National [employer] [place]) or 1-880.611.9894 (American Lung Association) or you can access the web based program at www.lungusa.org.    [place] Tobacco Users Help Line:  (307) 117-1093       Toll Free: 1-604.773.7848  Quit Tobacco Program [facility] [phone]    Crisis Hotline:    [place] Crisis Hotline:  5-901-PJWOVAZ or 1-882.635.8936    [place] Crisis Hotline:    1-966.452.8481 or 919-719-2735    Discharge  Survey:   Thank you for choosing [facility]. We hope we did everything we could to make your hospital stay a pleasant one. You may be receiving a phone survey and we would appreciate your time and participation in answering the questions. Your input is very valuable to us in our efforts to improve our service to our patients and their families.        My signature on this form indicates that:    1. I have reviewed and understand the above information.  2. My questions regarding this information have been answered to my satisfaction.  3. I have formulated a plan with my discharge nurse to obtain my prescribed medications for home.                  Disclaimer         __________________________________                     __________       ________                       Patient Signature                                                 Date                    Time

## 2017-03-16 NOTE — ED NOTES
Pt to er via [facility] with c/o congestion, sob, n/v since yesterday. Pt with marek upon er presentaion . Also wears oxygen via n/c. Increased work of breating noted with sats=97% on 2L n/c. Bilat arm bruising noted as well as attempted iv started sites by remsa. erp to bedside to eval for line placement. Dsq=377/92 on left arm, 151/93 on right arm. Wearing mask with dry non prod cough audible. Pt meets sepsis and pna criteria. erp and charge rn aware of same

## 2017-03-16 NOTE — PROGRESS NOTES
"Pharmacy Kinetics 26 y.o. female on vancomycin day # 1 3/16/2017    Will begin vancomycin 25mg/kg, ( 2900 mg) IV x 1 now, then 2000 mg( 17mg/kg) x 1 at 0400 on 3/17    Indication for Treatment: PNA    Pertinent history per medical record: Admitted on 3/16/2017 for 27 y/o paraplegic female, recent hospitalization for sepsis 2ndary to UTI.  Pt. Currently on cefdinir. Pt. Started having SOB yesterday with nausea and vomiting.    Other antibiotics: Cefepime 2 Gm IV [id number]    Allergies: Cefdinir; Depakote; Abilify; Amitriptyline; Amoxicillin; Ciprofloxacin; Clindamycin; Doxycycline; Ees; Flagyl; Flomax; Metformin; Sulfa drugs; Tape; Vancomycin; Cephalexin; and Levofloxacin     List concerns for renal function (possible concerns include abnormal LFTs, BUN/SCr ratio > 20:1, CHF, obesity, malnutrition/low albumin, hypermetabolic state (SIRS), pressors/hypotension, nephrotoxic drugs, etc.): Pt\"s BMI of 45.    Pertinent cultures to date:    urine enterococcus feacalis  3/9 BCp x 2 negative  3/9 urine proteus mirabilis    Recent Labs      17   1205   WBC  5.4   NEUTSPOLYS  63.00   BANDSSTABS  1.00     Recent Labs      17   1205   BUN  10   CREATININE  0.85   ALBUMIN  3.9     No results for input(s): VANCOTROUGH, VANCOPEAK, VANCORANDOM in the last 72 hours.No intake or output data in the 24 hours ending 17 1536   Blood pressure 157/92, pulse 102, temperature 37.4 °C (99.4 °F), resp. rate 20, height 1.6 m (5' 3\"), weight 116.9 kg (257 lb 11.5 oz), last menstrual period 2016, SpO2 94 %. Temp (24hrs), Av.4 °C (99.4 °F), Min:37.4 °C (99.4 °F), Max:37.4 °C (99.4 °F)      A/P   1. Loading dose and 1 time maintenance dose as above due to large BMI  2. Next vancomycin level: Random level at 1600 hours on 3/17  3. Goal trough: 16-20mcg/ml  4. Comments: Will redose when random level is available.    Brian Fenton [facility]      "

## 2017-03-16 NOTE — PROGRESS NOTES
1538 - Admit profile complete. Pt in bed resting and calm, pt c/o pain 8/10, pt verbalizes pain never gets better than 8/10 and is tolerable at this time, notified MD. Pt strength 5/5 in bilateral upper extremites and strength 2/5 in bilateral lower extremities. Pt suprapubic cath in place, CDI and draining appropriately. This RN discussed POC with pt, discussed monitoring vital signs, monitoring labs such as lactic acid and fall precautions, questions and concerns addressed appropriately, denies further questions or concerns at this time. Will continue to monitor.    1630 - This RN paged Dr. 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 This RN further discussed with Dr. Mckeon pt c/o chronic back, hip and knee pain at 8/10, pt home meds include flexeril and percocet, MD notified this RN he will look into it. Will continue to monitor.

## 2017-03-16 NOTE — H&P
"IDENTIFICATION:  A 26-year-old female patient of Dr. Torres Brody and Dr. Fox of neurology.    CHIEF COMPLAINT:  Cough, shortness of breath, and confusion.    HISTORY OF PRESENT ILLNESS:  The patient has an extremely complex medical   history including cryptic source of lower extremity paraparesis as well as   suprapubic catheter and multiple personality disorders who was most recently   admitted to [facility] 3/9/2017 through 3/11/2017.  She   was found to have a Proteus urinary tract infection and was discharged [place] on   the [place].  She had previously had Enterococcus on 2/20/2017.  The patient   presents today with a constellation of symptoms including shortness of breath,   vomiting, confusion, cough, and generalized pains.  She was found to have a   lactic acid of 4.  Further workup has ensued and urine remains pending.  She   will be admitted for working diagnosis of septic shock with unclear etiology   in the setting of recent admission and antibiotics.    REVIEW OF SYSTEMS:  As above.  She states she has been vomiting.  She denies   diarrhea.  She admits to confusion.  She has had \"sinus congestion.\"  She has   been experiencing a cough and wheezing.  She remains paralyzed in her lower   extremities.  Otherwise, full review of systems discussed and negative, except   those mentioned above.    PAST MEDICAL HISTORY:  I have read where she has been diagnosed with multiple   personality disorders.  I have also read where she has schizophrenia as well   as mood disorders.  She has been seen by psychiatry.  She also has a history   of neurogenic bladder with suprapubic catheter.  She has paraplegia of the   lower extremities of unclear etiology.  She has had an MRI of the brain and   spine and EMG studies, cerebrospinal fluid studies and all have been   unremarkable.  This has been worked up extensively with Dr. Fox including   a muscle biopsy.  The results were negative.  Sleep " apnea, scoliosis,   recurrent urinary tract infections, and hypothyroidism.    PAST SURGICAL HISTORY:  She has had a muscle biopsy by Dr. Vigil,   cholecystectomy, lumbar fusion, cardiac ablation, tonsillectomy, and a bowel   stimulator.    SOCIAL HISTORY:  She lives with her grandparents, an aunt and uncle.  She does   not drink or smoke.    FAMILY HISTORY:  No family history of paralysis.    ALLERGIES:  DEPAKOTE, ABILIFY, AMITRIPTYLINE, AMOXICILLIN, CIPROFLOXACIN,   CLINDAMYCIN, DOXYCYCLINE, ERYTHROMYCIN, FLAGYL, FLOMAX, METFORMIN, SULFA,   TAPE, VANCOMYCIN CAUSED HER FACE TO BECOME FLUSHED AND KEFLEX, LEVOFLOXACIN.    HOME MEDICATIONS:  Albuterol as needed, aspirin 81 mg daily, Lipitor 40 mg   daily, Omnicef 300 mg twice a day, last dose being this morning; cranberry   tablets 1000 mg twice a day, vitamin B12 1000 mcg twice a day, Flexeril 10 mg   twice a day, Duragesic patch 25 mcg to the skin every 3 days, Prozac 10 mg   daily, Lasix 20 mg daily, Neurontin 600 mg in the morning and 1200 mg in the   evening, ivabradine 5 mg twice a day, lactulose 10 g twice daily as needed for   constipation, Synthroid 75 mcg daily, melatonin 10 mg at bedtime, nystatin   powder to affected areas as needed, omeprazole 20 mg daily, Percocet 10/325   two tabs every 6 hours as needed, Phenergan 25 mg daily, Zantac 150 mg twice a   day, sodium bicarbonate 650 mg 3 times a day, vitamin D 50,000 units weekly,   and Geodon 160 mg in the evening.    PHYSICAL EXAMINATION:  VITAL SIGNS:  Blood pressure is 103/63, pulse 105, temperature is 99.4, and O2   sats 97% on 2 liters nasal cannula oxygen.  GENERAL:  The patient is well developed, well-nourished appearing.  PSYCHIATRIC:  She is awake and alert.  She is quite a good historian in some   aspects of her history and very poor historian in other parts of her history.  HEENT:  Dry mucous membranes.  No scleral icterus.  NECK:  Supple, without masses.  No adenopathy.  HEART:  Tachycardic,  little bit distant.  LUNGS:  She has expiratory wheezes throughout, mild degree of tachypnea.  No   rhonchi though.  ABDOMEN:  Obese and nontender.  She has a suprapubic catheter in place, a   little bit of redness around it, though no pus.  EXTREMITIES:  She currently has poor muscle tone in her lower extremities.  NEUROLOGIC:  Her lower extremities are paralyzed.  She does not move them to   painful stimuli nor to tickling the bottom of her feet.  INTEGUMENT:  She is very pale.  She has some bruising on her abdomen from   recent Lovenox shots.    LABORATORY DATA:  White blood cell count is 5.4 and hemoglobin is 11.9.    Potassium is 3.3 and glucose 229.  AST is 59 and ALT is 78.  Flu swab is   negative.  Lactic acid 4.1.  Her urinalysis is pending.    ASSESSMENT AND PLAN:  1.  Septic shock.  The source remains cryptic, though there is a very high   suspicion that urine in the source.  She has recently been hospitalized, has   been on antibiotics and is at risk of nosocomial infection; therefore, she   will be treated with cefepime and vancomycin till cultures are back.  We will   consider infectious disease consultation as well.  2.  Lower extremity paralysis.  She has had an extensive workup by Dr. Fox   including EMG, muscle biopsy, she has had an MRI of the entire central   nervous system, thought that perhaps there is a mitochondrial disease.  She   has been given high dose steroids which were not effective.  3.  History of psychiatric disease.  I have read was could be multiple   personality disorder versus schizophrenia.  Regardless, we will give her   Geodon.  4.  Normocytic anemia.  5.  Hypokalemia.  This will be replenished intravenously.  6.  Transaminitis.  We will follow some labs tomorrow.  7.  MULTIPLE DRUG ALLERGIES.  HER VANCOMYCIN ALLERGY IS SOME REDNESS.  I   suspect this is red man syndrome.  We will give vancomycin slowly.  8.  Chronic pain syndrome.  We should continue her fentanyl  patch.  9.  History of suprapubic catheter for neurogenic bladder.  This will be   changed out.    We anticipate the patient will require 2 midnights hospitalization for   treatment of her condition.    Patient is critically ill, 45 minutes critical care time were spent in   admission of patient, not including procedures.       ____________________________________     MD DEBORAH WILSON / CHRISTELLE    DD:  03/16/2017 13:34:13  DT:  03/16/2017 16:37:46    D#:  [id number]  Job#:  [id number]

## 2017-03-16 NOTE — TELEPHONE ENCOUNTER
Home health wanted you to know patient is on her way to the ER  Patient was unable to breath and has been sick.

## 2017-03-16 NOTE — TELEPHONE ENCOUNTER
1. Caller Name: Kristin                                         Call Back Number: [phone] (home)         Patient approves a detailed voicemail message: yes     patient has been having moist cough. Wants to know if you can prescribe prednisone or does she need ov

## 2017-03-16 NOTE — IP AVS SNAPSHOT
3/20/2017          Kristin Balderrama  [address] [place] [address]    Dear Kristin:    [facility] wants to ensure your discharge home is safe and you or your loved ones have had all your questions answered regarding your care after you leave the hospital.    You may receive a telephone call within two days of your discharge.  This call is to make certain you understand your discharge instructions as well as ensure we provided you with the best care possible during your stay with us.     The call will only last approximately 3-5 minutes and will be done by a nurse.    Once again, we want to ensure your discharge home is safe and that you have a clear understanding of any next steps in your care.  If you have any questions or concerns, please do not hesitate to contact us, we are here for you.  Thank you for choosing [facility] for your healthcare needs.    Sincerely,    Lorenzo Zuleta    [facility]

## 2017-03-16 NOTE — TELEPHONE ENCOUNTER
1. Caller Name: Kristin Balderrama                                             Call Back Number: [phone]        Patient approves a detailed voicemail message: N\A    Patient stated that she just got out of the hospital on Saturday, for UTI and sepsis, patient stated that her urine has mucus and sediments, patient is currently on Cefdinir 300 mg and this medication is not working, she wanted to know if you would send a different medication to her pharmacy and if you would also send a steroid to the patients pharmacy? Patient stated that she did not do the IV infusion due to being so sick, Thank you Please advise

## 2017-03-16 NOTE — IP AVS SNAPSHOT
Alkermes Access Code: Activation code not generated  Current Alkermes Status: Active    HotPadshart  A secure, online tool to manage your health information     [employer] Alkermes® is a secure, online tool that connects you to your personalized health information from the privacy of your home -- day or night - making it very easy for you to manage your healthcare. Once the activation process is completed, you can even access your medical information using the Alkermes rocio, which is available for free in the Apple Rocio store or Google Play store.     Alkermes provides the following levels of access (as shown below):   My Chart Features   [facility] Primary Care Doctor [facility]  Specialists [facility]  Urgent  Care Non-[facility]  Primary Care  Doctor   Email your healthcare team securely and privately 24/7 X X X X   Manage appointments: schedule your next appointment; view details of past/upcoming appointments X      Request prescription refills. X      View recent personal medical records, including lab and immunizations X X X X   View health record, including health history, allergies, medications X X X X   Read reports about your outpatient visits, procedures, consult and ER notes X X X X   See your discharge summary, which is a recap of your hospital and/or ER visit that includes your diagnosis, lab results, and care plan. X X       How to register for Alkermes:  1. Go to  [email or web address].  2. Click on the Sign Up Now box, which takes you to the New Member Sign Up page. You will need to provide the following information:  a. Enter your Alkermes Access Code exactly as it appears at the top of this page. (You will not need to use this code after you’ve completed the sign-up process. If you do not sign up before the expiration date, you must request a new code.)   b. Enter your date of birth.   c. Enter your home email address.   d. Click Submit, and follow the next screen’s instructions.  3. Create a Alkermes ID. This will  be your [facility] login ID and cannot be changed, so think of one that is secure and easy to remember.  4. Create a [facility] password. You can change your password at any time.  5. Enter your Password Reset Question and Answer. This can be used at a later time if you forget your password.   6. Enter your e-mail address. This allows you to receive e-mail notifications when new information is available in [facility].  7. Click Sign Up. You can now view your health information.    For assistance activating your [facility] account, call [phone]

## 2017-03-17 ENCOUNTER — APPOINTMENT (OUTPATIENT)
Dept: BEHAVIORAL HEALTH | Facility: PHYSICIAN GROUP | Age: 27
End: 2017-03-17
Payer: MEDICARE

## 2017-03-17 LAB
ALBUMIN SERPL BCP-MCNC: 2.8 G/DL (ref 3.2–4.9)
ALBUMIN/GLOB SERPL: 1.3 G/DL
ALP SERPL-CCNC: 44 U/L (ref 30–99)
ALT SERPL-CCNC: 53 U/L (ref 2–50)
ANION GAP SERPL CALC-SCNC: 6 MMOL/L (ref 0–11.9)
AST SERPL-CCNC: 47 U/L (ref 12–45)
BILIRUB SERPL-MCNC: 0.8 MG/DL (ref 0.1–1.5)
BUN SERPL-MCNC: 5 MG/DL (ref 8–22)
CALCIUM SERPL-MCNC: 8.5 MG/DL (ref 8.4–10.2)
CHLORIDE SERPL-SCNC: 107 MMOL/L (ref 96–112)
CO2 SERPL-SCNC: 24 MMOL/L (ref 20–33)
CREAT SERPL-MCNC: 0.64 MG/DL (ref 0.5–1.4)
ERYTHROCYTE [DISTWIDTH] IN BLOOD BY AUTOMATED COUNT: 46.5 FL (ref 35.9–50)
GFR SERPL CREATININE-BSD FRML MDRD: >60 ML/MIN/1.73 M 2
GLOBULIN SER CALC-MCNC: 2.1 G/DL (ref 1.9–3.5)
GLUCOSE SERPL-MCNC: 143 MG/DL (ref 65–99)
HCT VFR BLD AUTO: 27.7 % (ref 37–47)
HGB BLD-MCNC: 9.4 G/DL (ref 12–16)
LACTATE BLD-SCNC: 2.02 MMOL/L (ref 0.5–2)
LACTATE BLD-SCNC: 2.89 MMOL/L (ref 0.5–2)
MCH RBC QN AUTO: 30.9 PG (ref 27–33)
MCHC RBC AUTO-ENTMCNC: 33.9 G/DL (ref 33.6–35)
MCV RBC AUTO: 91.1 FL (ref 81.4–97.8)
PLATELET # BLD AUTO: 139 K/UL (ref 164–446)
PMV BLD AUTO: 11.6 FL (ref 9–12.9)
POTASSIUM SERPL-SCNC: 3.8 MMOL/L (ref 3.6–5.5)
PROT SERPL-MCNC: 4.9 G/DL (ref 6–8.2)
RBC # BLD AUTO: 3.04 M/UL (ref 4.2–5.4)
SODIUM SERPL-SCNC: 137 MMOL/L (ref 135–145)
SPECIMEN DRAWN FROM PATIENT: ABNORMAL
SPECIMEN DRAWN FROM PATIENT: ABNORMAL
VANCOMYCIN TIMED 2584: 11.2 UG/ML
WBC # BLD AUTO: 3.8 K/UL (ref 4.8–10.8)

## 2017-03-17 PROCEDURE — 80202 ASSAY OF VANCOMYCIN: CPT

## 2017-03-17 PROCEDURE — 85027 COMPLETE CBC AUTOMATED: CPT

## 2017-03-17 PROCEDURE — 93005 ELECTROCARDIOGRAM TRACING: CPT

## 2017-03-17 PROCEDURE — 770020 HCHG ROOM/CARE - TELE (206)

## 2017-03-17 PROCEDURE — 99233 SBSQ HOSP IP/OBS HIGH 50: CPT | Performed by: INTERNAL MEDICINE

## 2017-03-17 PROCEDURE — 700102 HCHG RX REV CODE 250 W/ 637 OVERRIDE(OP): Performed by: INTERNAL MEDICINE

## 2017-03-17 PROCEDURE — 94760 N-INVAS EAR/PLS OXIMETRY 1: CPT

## 2017-03-17 PROCEDURE — 94640 AIRWAY INHALATION TREATMENT: CPT

## 2017-03-17 PROCEDURE — 665999 HH PPS REVENUE DEBIT

## 2017-03-17 PROCEDURE — A9270 NON-COVERED ITEM OR SERVICE: HCPCS | Performed by: HOSPITALIST

## 2017-03-17 PROCEDURE — 80053 COMPREHEN METABOLIC PANEL: CPT

## 2017-03-17 PROCEDURE — 700105 HCHG RX REV CODE 258: Performed by: HOSPITALIST

## 2017-03-17 PROCEDURE — 700111 HCHG RX REV CODE 636 W/ 250 OVERRIDE (IP): Performed by: HOSPITALIST

## 2017-03-17 PROCEDURE — 83605 ASSAY OF LACTIC ACID: CPT

## 2017-03-17 PROCEDURE — A9270 NON-COVERED ITEM OR SERVICE: HCPCS | Performed by: INTERNAL MEDICINE

## 2017-03-17 PROCEDURE — 700105 HCHG RX REV CODE 258: Performed by: INTERNAL MEDICINE

## 2017-03-17 PROCEDURE — 700102 HCHG RX REV CODE 250 W/ 637 OVERRIDE(OP): Performed by: HOSPITALIST

## 2017-03-17 PROCEDURE — 700101 HCHG RX REV CODE 250: Performed by: HOSPITALIST

## 2017-03-17 PROCEDURE — 665998 HH PPS REVENUE CREDIT

## 2017-03-17 PROCEDURE — 700111 HCHG RX REV CODE 636 W/ 250 OVERRIDE (IP): Performed by: INTERNAL MEDICINE

## 2017-03-17 RX ADMIN — POTASSIUM CHLORIDE AND SODIUM CHLORIDE: 900; 150 INJECTION, SOLUTION INTRAVENOUS at 05:08

## 2017-03-17 RX ADMIN — CEFEPIME HYDROCHLORIDE 2 G: 2 INJECTION, POWDER, FOR SOLUTION INTRAVENOUS at 21:19

## 2017-03-17 RX ADMIN — ALBUTEROL SULFATE 2.5 MG: 2.5 SOLUTION RESPIRATORY (INHALATION) at 14:36

## 2017-03-17 RX ADMIN — LEVOTHYROXINE SODIUM 75 MCG: 75 TABLET ORAL at 05:08

## 2017-03-17 RX ADMIN — ATORVASTATIN CALCIUM 40 MG: 40 TABLET, FILM COATED ORAL at 21:23

## 2017-03-17 RX ADMIN — GABAPENTIN 600 MG: 300 CAPSULE ORAL at 07:36

## 2017-03-17 RX ADMIN — NYSTATIN 1500000 UNITS: 100000 POWDER TOPICAL at 16:14

## 2017-03-17 RX ADMIN — ALBUTEROL SULFATE 2.5 MG: 2.5 SOLUTION RESPIRATORY (INHALATION) at 22:47

## 2017-03-17 RX ADMIN — OXYCODONE HYDROCHLORIDE 10 MG: 10 TABLET ORAL at 18:34

## 2017-03-17 RX ADMIN — VANCOMYCIN HYDROCHLORIDE 2000 MG: 10 INJECTION, POWDER, LYOPHILIZED, FOR SOLUTION INTRAVENOUS at 18:29

## 2017-03-17 RX ADMIN — ALBUTEROL SULFATE 2.5 MG: 2.5 SOLUTION RESPIRATORY (INHALATION) at 10:41

## 2017-03-17 RX ADMIN — ONDANSETRON 4 MG: 4 TABLET, ORALLY DISINTEGRATING ORAL at 07:42

## 2017-03-17 RX ADMIN — OXYCODONE HYDROCHLORIDE 10 MG: 10 TABLET ORAL at 23:09

## 2017-03-17 RX ADMIN — VANCOMYCIN HYDROCHLORIDE 2000 MG: 10 INJECTION, POWDER, LYOPHILIZED, FOR SOLUTION INTRAVENOUS at 04:16

## 2017-03-17 RX ADMIN — PROCHLORPERAZINE EDISYLATE 10 MG: 5 INJECTION INTRAMUSCULAR; INTRAVENOUS at 02:35

## 2017-03-17 RX ADMIN — POTASSIUM CHLORIDE AND SODIUM CHLORIDE: 900; 150 INJECTION, SOLUTION INTRAVENOUS at 21:26

## 2017-03-17 RX ADMIN — NYSTATIN 1 APPLICATION: 100000 POWDER TOPICAL at 21:00

## 2017-03-17 RX ADMIN — OXYCODONE HYDROCHLORIDE 10 MG: 10 TABLET ORAL at 01:02

## 2017-03-17 RX ADMIN — FLUOXETINE 10 MG: 10 CAPSULE ORAL at 07:37

## 2017-03-17 RX ADMIN — OXYCODONE HYDROCHLORIDE 10 MG: 10 TABLET ORAL at 10:37

## 2017-03-17 RX ADMIN — OXYCODONE HYDROCHLORIDE 10 MG: 10 TABLET ORAL at 05:08

## 2017-03-17 RX ADMIN — ZIPRASIDONE HYDROCHLORIDE 160 MG: 20 CAPSULE ORAL at 16:11

## 2017-03-17 RX ADMIN — SENNOSIDES AND DOCUSATE SODIUM 2 TABLET: 8.6; 5 TABLET ORAL at 21:23

## 2017-03-17 RX ADMIN — ENOXAPARIN SODIUM 40 MG: 100 INJECTION SUBCUTANEOUS at 07:36

## 2017-03-17 RX ADMIN — GABAPENTIN 1200 MG: 400 CAPSULE ORAL at 21:23

## 2017-03-17 RX ADMIN — CEFEPIME HYDROCHLORIDE 2 G: 2 INJECTION, POWDER, FOR SOLUTION INTRAVENOUS at 07:36

## 2017-03-17 ASSESSMENT — PAIN SCALES - GENERAL
PAINLEVEL_OUTOF10: 8
PAINLEVEL_OUTOF10: 9
PAINLEVEL_OUTOF10: 8
PAINLEVEL_OUTOF10: 9
PAINLEVEL_OUTOF10: 4

## 2017-03-17 ASSESSMENT — ENCOUNTER SYMPTOMS
NAUSEA: 0
MENTAL STATUS CHANGE: 1
CONSTIPATION: 0
TINGLING: 0
MYALGIAS: 1
PALPITATIONS: 0
FLANK PAIN: 0
VOMITING: 0
DIAPHORESIS: 0
FOCAL WEAKNESS: 1
FEVER: 0
COUGH: 0
DEPRESSION: 0
SHORTNESS OF BREATH: 0
CHILLS: 0
SPEECH CHANGE: 0
WEAKNESS: 1
ABDOMINAL PAIN: 0
MEMORY LOSS: 0
SENSORY CHANGE: 0
DIZZINESS: 0
DIARRHEA: 0
BACK PAIN: 1
HEADACHES: 0
NERVOUS/ANXIOUS: 1
TREMORS: 0

## 2017-03-17 NOTE — PROGRESS NOTES
IV fluids infusing, diet soda provided. Denies needs at this time. Bed in low position. SCDs in place and on.

## 2017-03-17 NOTE — FLOWSHEET NOTE
03/17/17 1436   Interdisciplinary Plan of Care-Goals (Indications)   Obstructive Ventilatory Defect or Pulmonary Disease without Obvious Obstruction History / Diagnosis   Interdisciplinary Plan of Care-Outcomes    Bronchodilator Outcome Patient at Stable Baseline   Education   Education Yes - Pt. / Family has been Instructed in use of Respiratory Medications and Adverse Reactions   RT Assessment of Delivered Medications   Evaluation of Medication Delivery Daily Yes-- Pt /Family has been Instructed in use of Respiratory Medications and Adverse Reactions   SVN Group   #SVN Performed Yes   Given By: Mouthpiece   Respiratory WDL   Respiratory (WDL) X   Chest Exam   Respiration 17   Pulse (!) 101   Work Of Breathing / Effort Mild   Breath Sounds   Pre/Post Intervention Pre Intervention Assessment   RUL Breath Sounds Expiratory Wheezes   RML Breath Sounds Clear   RLL Breath Sounds Clear   MARY Breath Sounds Clear   LLL Breath Sounds Expiratory Wheezes   Oximetry   Continuous Oximetry Yes   Oxygen   Pulse Oximetry 94 %   O2 (LPM) 2   O2 Daily Delivery Respiratory  Nasal Cannula

## 2017-03-17 NOTE — PROGRESS NOTES
Report given to Celine on Telemetry.  Dr. Baxter informed of heart medication will hold at this time per MD

## 2017-03-17 NOTE — PROGRESS NOTES
Replaced suprapubic cath.  Patient rounds nystatin powder ordered.  Patient was supposed to have IV steriods due to paralysis per patient.  Dr. Fox ordered per patient.  Patient linens changed by CNA.  Patient repositioned.

## 2017-03-17 NOTE — FLOWSHEET NOTE
03/16/17 1655   Interdisciplinary Plan of Care-Goals (Indications)   Obstructive Ventilatory Defect or Pulmonary Disease without Obvious Obstruction History / Diagnosis   Interdisciplinary Plan of Care-Outcomes    Bronchodilator Outcome Diminished Wheezing and Volume of Air Movement Increased   Education   Education Yes - Pt. / Family has been Instructed in use of Respiratory Medications and Adverse Reactions   RT Assessment of Delivered Medications   Evaluation of Medication Delivery Daily Yes-- Pt /Family has been Instructed in use of Respiratory Medications and Adverse Reactions   SVN Group   #SVN Performed Yes   Given By: Mouthpiece   Date SVN Last Changed 03/16/17   Date SVN Next Change Due (Q 7 Days) 03/23/17   Respiratory WDL   Respiratory (WDL) X   Chest Exam   Respiration (!) 24   Pulse (!) 102   Heart Rate (Monitored) 96   Breath Sounds   RUL Breath Sounds Clear   RML Breath Sounds Fine Crackles   RLL Breath Sounds Diminished   MARY Breath Sounds Clear   LLL Breath Sounds Diminished   Secretions   Cough Strong;Croupy   Oximetry   Continuous Oximetry Yes   Oxygen   Pulse Oximetry 97 %   O2 (LPM) 2   O2 Daily Delivery Respiratory  Nasal Cannula

## 2017-03-17 NOTE — DIETARY
NUTRITION SERVICES: BMI - Pt with BMI >40 (45.66). Weight loss counseling not appropriate in acute care setting. RECOMMEND - Referral to outpatient nutrition services for weight management after D/C.

## 2017-03-17 NOTE — PROGRESS NOTES
Bedside report received. Assumed care of patient. Patient is resting well in bed. Patient denies pain, requested to be repositioned, patient turned and repositioned. All needs are currently met. All safety precautions in place. Will continue to monitor.

## 2017-03-17 NOTE — CARE PLAN
Problem: Safety  Goal: Will remain free from injury  Outcome: PROGRESSING AS EXPECTED  Fall precautions in place. Bed alarm in use, side rails up x3, call light within reach. Will continue to monitor.    Problem: Knowledge Deficit  Goal: Knowledge of disease process/condition, treatment plan, diagnostic tests, and medications will improve  Outcome: PROGRESSING AS EXPECTED  This RN discussed POC with pt upon arrival to unit, discussed monitoring vital signs per protocol, discussed regular diet ordered, monitoring labs including lactic acid per orders, discussed fall precautions, pt verbalizes understanding, denies further questions or concerns at this time. Will continue to monitor.

## 2017-03-17 NOTE — FLOWSHEET NOTE
03/17/17 1041   Interdisciplinary Plan of Care-Goals (Indications)   Obstructive Ventilatory Defect or Pulmonary Disease without Obvious Obstruction History / Diagnosis   Interdisciplinary Plan of Care-Outcomes    Bronchodilator Outcome Diminished Wheezing and Volume of Air Movement Increased   Education   Education Yes - Pt. / Family has been Instructed in use of Respiratory Medications and Adverse Reactions   RT Assessment of Delivered Medications   Evaluation of Medication Delivery Daily Yes-- Pt /Family has been Instructed in use of Respiratory Medications and Adverse Reactions   SVN Group   #SVN Performed Yes   Given By: Mouthpiece   Date SVN Last Changed 03/16/17   Date SVN Next Change Due (Q 7 Days) 03/23/17   Respiratory WDL   Respiratory (WDL) X   Chest Exam   Respiration 19   Pulse (!) 104   Heart Rate (Monitored) (!) 102   Work Of Breathing / Effort Mild   Breath Sounds   Pre/Post Intervention Pre Intervention Assessment   RUL Breath Sounds Expiratory Wheezes   RML Breath Sounds Expiratory Wheezes   RLL Breath Sounds Clear   MARY Breath Sounds Clear   LLL Breath Sounds Expiratory Wheezes   Oximetry   Continuous Oximetry Yes   Oxygen   Pulse Oximetry 93 %   O2 (LPM) 2   O2 Daily Delivery Respiratory  Nasal Cannula

## 2017-03-17 NOTE — CARE PLAN
Problem: Communication  Goal: The ability to communicate needs accurately and effectively will improve  Patient is able to call appropriately for any and all needs.     Problem: Knowledge Deficit  Goal: Knowledge of disease process/condition, treatment plan, diagnostic tests, and medications will improve  POC discussed with patient. All questions answered. Patient verbalized understanding.

## 2017-03-17 NOTE — CARE PLAN
Problem: Safety  Goal: Will remain free from falls  Intervention: Implement fall precautions    03/17/17 0800 03/17/17 1155   OTHER   Environmental Precautions --  Treaded Slipper Socks on Patient;Personal Belongings, Wastebasket, Call Bell etc. in Easy Reach;Report Given to Other Health Care Providers Regarding Fall Risk;Transferred to Stronger Side;Bed in Low Position;Communication Sign for Patients & Families;Mobility Assessed & Appropriate Sign Placed   Bed Alarm (Built in - for ICU ONLY) Patient Educated Regarding Fall Risk and Need for Bed Alarm, Understands and Continues to Refuse --            Problem: Skin Integrity  Goal: Risk for impaired skin integrity will decrease  Intervention: Implement precautions to protect skin integrity in collaboration with the interdisciplinary team    03/16/17 2000 03/17/17 1155   OTHER   Skin Preventative Measures --  Pillows in Use for Support / Positioning   Bed Types --  Pressure Redistribution Mattress (Atmosair)   Friction Interventions --  Draw Sheet / Pad Used for Repositioning   Moisturizers Moisturizer  --    Activity  --  Bed   Patient Turns / Repositioning --  Supine   Assistance / Tolerance for Turning/Repositioning --  Assistance of Two or More   Patient is Receiving Nutrition --  Oral Intake Adequate   Vitamin Therapy in Use --  No     Pt aware of the need for q2 turns. Pt aaox2.

## 2017-03-18 LAB
ANION GAP SERPL CALC-SCNC: 9 MMOL/L (ref 0–11.9)
BACTERIA UR CULT: NORMAL
BASOPHILS # BLD AUTO: 0 % (ref 0–1.8)
BASOPHILS # BLD: 0 K/UL (ref 0–0.12)
BUN SERPL-MCNC: 6 MG/DL (ref 8–22)
CALCIUM SERPL-MCNC: 9.2 MG/DL (ref 8.4–10.2)
CHLORIDE SERPL-SCNC: 103 MMOL/L (ref 96–112)
CO2 SERPL-SCNC: 25 MMOL/L (ref 20–33)
CREAT SERPL-MCNC: 0.58 MG/DL (ref 0.5–1.4)
CRP SERPL HS-MCNC: 2.06 MG/DL (ref 0–0.75)
EOSINOPHIL # BLD AUTO: 0.2 K/UL (ref 0–0.51)
EOSINOPHIL NFR BLD: 5.3 % (ref 0–6.9)
ERYTHROCYTE [DISTWIDTH] IN BLOOD BY AUTOMATED COUNT: 46.3 FL (ref 35.9–50)
GFR SERPL CREATININE-BSD FRML MDRD: >60 ML/MIN/1.73 M 2
GLUCOSE BLD-MCNC: 115 MG/DL (ref 65–99)
GLUCOSE BLD-MCNC: 134 MG/DL (ref 65–99)
GLUCOSE BLD-MCNC: 212 MG/DL (ref 65–99)
GLUCOSE BLD-MCNC: 361 MG/DL (ref 65–99)
GLUCOSE SERPL-MCNC: 116 MG/DL (ref 65–99)
HCT VFR BLD AUTO: 29.2 % (ref 37–47)
HGB BLD-MCNC: 9.8 G/DL (ref 12–16)
IMM GRANULOCYTES # BLD AUTO: 0.01 K/UL (ref 0–0.11)
IMM GRANULOCYTES NFR BLD AUTO: 0.3 % (ref 0–0.9)
LYMPHOCYTES # BLD AUTO: 1.59 K/UL (ref 1–4.8)
LYMPHOCYTES NFR BLD: 42.3 % (ref 22–41)
MCH RBC QN AUTO: 30.7 PG (ref 27–33)
MCHC RBC AUTO-ENTMCNC: 33.6 G/DL (ref 33.6–35)
MCV RBC AUTO: 91.5 FL (ref 81.4–97.8)
MONOCYTES # BLD AUTO: 0.24 K/UL (ref 0–0.85)
MONOCYTES NFR BLD AUTO: 6.4 % (ref 0–13.4)
NEUTROPHILS # BLD AUTO: 1.72 K/UL (ref 2–7.15)
NEUTROPHILS NFR BLD: 45.7 % (ref 44–72)
NRBC # BLD AUTO: 0 K/UL
NRBC BLD AUTO-RTO: 0 /100 WBC
PLATELET # BLD AUTO: 151 K/UL (ref 164–446)
PMV BLD AUTO: 11.2 FL (ref 9–12.9)
POTASSIUM SERPL-SCNC: 4.5 MMOL/L (ref 3.6–5.5)
RBC # BLD AUTO: 3.19 M/UL (ref 4.2–5.4)
SIGNIFICANT IND 70042: NORMAL
SITE SITE: NORMAL
SODIUM SERPL-SCNC: 137 MMOL/L (ref 135–145)
SOURCE SOURCE: NORMAL
WBC # BLD AUTO: 3.8 K/UL (ref 4.8–10.8)

## 2017-03-18 PROCEDURE — 94760 N-INVAS EAR/PLS OXIMETRY 1: CPT

## 2017-03-18 PROCEDURE — G8989 SELF CARE D/C STATUS: HCPCS | Mod: CL

## 2017-03-18 PROCEDURE — A9270 NON-COVERED ITEM OR SERVICE: HCPCS | Performed by: HOSPITALIST

## 2017-03-18 PROCEDURE — 700101 HCHG RX REV CODE 250: Performed by: HOSPITALIST

## 2017-03-18 PROCEDURE — 700105 HCHG RX REV CODE 258: Performed by: HOSPITALIST

## 2017-03-18 PROCEDURE — 80048 BASIC METABOLIC PNL TOTAL CA: CPT

## 2017-03-18 PROCEDURE — 85025 COMPLETE CBC W/AUTO DIFF WBC: CPT

## 2017-03-18 PROCEDURE — 82962 GLUCOSE BLOOD TEST: CPT | Mod: 91

## 2017-03-18 PROCEDURE — 86140 C-REACTIVE PROTEIN: CPT

## 2017-03-18 PROCEDURE — 99233 SBSQ HOSP IP/OBS HIGH 50: CPT | Performed by: INTERNAL MEDICINE

## 2017-03-18 PROCEDURE — 770020 HCHG ROOM/CARE - TELE (206)

## 2017-03-18 PROCEDURE — G8987 SELF CARE CURRENT STATUS: HCPCS | Mod: CL

## 2017-03-18 PROCEDURE — 700102 HCHG RX REV CODE 250 W/ 637 OVERRIDE(OP): Performed by: INTERNAL MEDICINE

## 2017-03-18 PROCEDURE — G8978 MOBILITY CURRENT STATUS: HCPCS | Mod: CM

## 2017-03-18 PROCEDURE — 700111 HCHG RX REV CODE 636 W/ 250 OVERRIDE (IP): Performed by: HOSPITALIST

## 2017-03-18 PROCEDURE — 700111 HCHG RX REV CODE 636 W/ 250 OVERRIDE (IP): Performed by: INTERNAL MEDICINE

## 2017-03-18 PROCEDURE — G8979 MOBILITY GOAL STATUS: HCPCS | Mod: CL

## 2017-03-18 PROCEDURE — 97165 OT EVAL LOW COMPLEX 30 MIN: CPT

## 2017-03-18 PROCEDURE — 700102 HCHG RX REV CODE 250 W/ 637 OVERRIDE(OP): Performed by: HOSPITALIST

## 2017-03-18 PROCEDURE — G8988 SELF CARE GOAL STATUS: HCPCS | Mod: CL

## 2017-03-18 PROCEDURE — 665999 HH PPS REVENUE DEBIT

## 2017-03-18 PROCEDURE — 700105 HCHG RX REV CODE 258: Performed by: INTERNAL MEDICINE

## 2017-03-18 PROCEDURE — 94640 AIRWAY INHALATION TREATMENT: CPT

## 2017-03-18 PROCEDURE — 665998 HH PPS REVENUE CREDIT

## 2017-03-18 PROCEDURE — 97163 PT EVAL HIGH COMPLEX 45 MIN: CPT

## 2017-03-18 RX ORDER — DEXTROSE MONOHYDRATE 25 G/50ML
25 INJECTION, SOLUTION INTRAVENOUS
Status: DISCONTINUED | OUTPATIENT
Start: 2017-03-18 | End: 2017-03-20 | Stop reason: HOSPADM

## 2017-03-18 RX ORDER — METHYLPREDNISOLONE SODIUM SUCCINATE 1 G/16ML
1000 INJECTION, POWDER, LYOPHILIZED, FOR SOLUTION INTRAMUSCULAR; INTRAVENOUS DAILY
Status: DISCONTINUED | OUTPATIENT
Start: 2017-03-18 | End: 2017-03-18

## 2017-03-18 RX ADMIN — VANCOMYCIN HYDROCHLORIDE 2000 MG: 10 INJECTION, POWDER, LYOPHILIZED, FOR SOLUTION INTRAVENOUS at 17:45

## 2017-03-18 RX ADMIN — POTASSIUM CHLORIDE AND SODIUM CHLORIDE: 900; 150 INJECTION, SOLUTION INTRAVENOUS at 06:20

## 2017-03-18 RX ADMIN — OXYCODONE HYDROCHLORIDE 10 MG: 10 TABLET ORAL at 16:23

## 2017-03-18 RX ADMIN — OXYCODONE HYDROCHLORIDE 10 MG: 10 TABLET ORAL at 20:48

## 2017-03-18 RX ADMIN — SODIUM CHLORIDE 1000 MG: 9 INJECTION, SOLUTION INTRAVENOUS at 12:14

## 2017-03-18 RX ADMIN — LEVOTHYROXINE SODIUM 75 MCG: 75 TABLET ORAL at 06:19

## 2017-03-18 RX ADMIN — SENNOSIDES AND DOCUSATE SODIUM 2 TABLET: 8.6; 5 TABLET ORAL at 08:02

## 2017-03-18 RX ADMIN — ATORVASTATIN CALCIUM 40 MG: 40 TABLET, FILM COATED ORAL at 20:48

## 2017-03-18 RX ADMIN — INSULIN LISPRO 2 UNITS: 100 INJECTION, SOLUTION INTRAVENOUS; SUBCUTANEOUS at 17:56

## 2017-03-18 RX ADMIN — GABAPENTIN 1200 MG: 400 CAPSULE ORAL at 20:48

## 2017-03-18 RX ADMIN — NYSTATIN 1500000 UNITS: 100000 POWDER TOPICAL at 16:07

## 2017-03-18 RX ADMIN — CEFEPIME HYDROCHLORIDE 2 G: 2 INJECTION, POWDER, FOR SOLUTION INTRAVENOUS at 08:03

## 2017-03-18 RX ADMIN — ONDANSETRON 4 MG: 4 TABLET, ORALLY DISINTEGRATING ORAL at 01:41

## 2017-03-18 RX ADMIN — GABAPENTIN 600 MG: 300 CAPSULE ORAL at 08:02

## 2017-03-18 RX ADMIN — PROCHLORPERAZINE EDISYLATE 10 MG: 5 INJECTION INTRAMUSCULAR; INTRAVENOUS at 16:09

## 2017-03-18 RX ADMIN — OXYCODONE HYDROCHLORIDE 10 MG: 10 TABLET ORAL at 12:14

## 2017-03-18 RX ADMIN — OXYCODONE HYDROCHLORIDE 10 MG: 10 TABLET ORAL at 04:37

## 2017-03-18 RX ADMIN — SENNOSIDES AND DOCUSATE SODIUM 2 TABLET: 8.6; 5 TABLET ORAL at 20:48

## 2017-03-18 RX ADMIN — ZIPRASIDONE HYDROCHLORIDE 160 MG: 20 CAPSULE ORAL at 17:39

## 2017-03-18 RX ADMIN — FLUOXETINE 10 MG: 10 CAPSULE ORAL at 08:03

## 2017-03-18 RX ADMIN — ALBUTEROL SULFATE 2.5 MG: 2.5 SOLUTION RESPIRATORY (INHALATION) at 18:59

## 2017-03-18 RX ADMIN — VANCOMYCIN HYDROCHLORIDE 2000 MG: 10 INJECTION, POWDER, LYOPHILIZED, FOR SOLUTION INTRAVENOUS at 06:18

## 2017-03-18 RX ADMIN — ENOXAPARIN SODIUM 40 MG: 100 INJECTION SUBCUTANEOUS at 08:03

## 2017-03-18 RX ADMIN — ALBUTEROL SULFATE 2.5 MG: 2.5 SOLUTION RESPIRATORY (INHALATION) at 13:42

## 2017-03-18 RX ADMIN — CEFEPIME HYDROCHLORIDE 2 G: 2 INJECTION, POWDER, FOR SOLUTION INTRAVENOUS at 20:50

## 2017-03-18 RX ADMIN — ALBUTEROL SULFATE 2.5 MG: 2.5 SOLUTION RESPIRATORY (INHALATION) at 06:45

## 2017-03-18 ASSESSMENT — ENCOUNTER SYMPTOMS
SHORTNESS OF BREATH: 0
NERVOUS/ANXIOUS: 1
CLAUDICATION: 0
WEAKNESS: 1
PALPITATIONS: 0
FLANK PAIN: 0
COUGH: 0
FOCAL WEAKNESS: 1
BACK PAIN: 1
NAUSEA: 0
FEVER: 0
SPEECH CHANGE: 0
MEMORY LOSS: 0
CHILLS: 0
ABDOMINAL PAIN: 0
TREMORS: 0
SENSORY CHANGE: 1
MYALGIAS: 1
DIZZINESS: 0

## 2017-03-18 ASSESSMENT — PAIN SCALES - GENERAL
PAINLEVEL_OUTOF10: 8
PAINLEVEL_OUTOF10: 3
PAINLEVEL_OUTOF10: 8
PAINLEVEL_OUTOF10: 8

## 2017-03-18 ASSESSMENT — ACTIVITIES OF DAILY LIVING (ADL): TOILETING: REQUIRES ASSIST

## 2017-03-18 ASSESSMENT — GAIT ASSESSMENTS: GAIT LEVEL OF ASSIST: UNABLE TO PARTICIPATE

## 2017-03-18 NOTE — PROGRESS NOTES
Assessment completed, medicated per MAR. Denies pain and nausea at this time. Plan of care discussed.

## 2017-03-18 NOTE — PROGRESS NOTES
Hospital Medicine Progress Note, Adult, Complex               Author: Floridalma Baxter Date & Time created: 3/17/2017  5:36 PM     Interval History:  26F with recent dc for uti returns with lactic acidosis and unclear etiology of septic shock    3/17  - bp stable, feels better, neg n/v/d, + ongoing LE weakness    Review of Systems:  Review of Systems   Constitutional: Negative for fever, chills, malaise/fatigue and diaphoresis.   HENT: Negative for congestion and hearing loss.    Respiratory: Negative for cough and shortness of breath.    Cardiovascular: Positive for leg swelling. Negative for chest pain and palpitations.   Gastrointestinal: Negative for nausea, vomiting, abdominal pain, diarrhea and constipation.   Genitourinary: Negative for dysuria and flank pain.   Musculoskeletal: Positive for myalgias and back pain. Negative for joint pain.   Neurological: Positive for focal weakness and weakness. Negative for dizziness, tingling, tremors, sensory change, speech change and headaches.   Psychiatric/Behavioral: Negative for depression and memory loss. The patient is nervous/anxious.        Physical Exam:  Physical Exam   Constitutional: She is oriented to person, place, and time. She appears well-nourished. No distress.   Morbid obesity   HENT:   Head: Normocephalic and atraumatic.   Mouth/Throat: No oropharyngeal exudate.   Eyes: EOM are normal. Pupils are equal, round, and reactive to light. No scleral icterus.   Neck: Normal range of motion. Neck supple. No thyromegaly present.   Cardiovascular: Normal rate and intact distal pulses.    No murmur heard.  Pulmonary/Chest: Effort normal and breath sounds normal. No respiratory distress. She has no rales.   Abdominal: Soft. Bowel sounds are normal. She exhibits no distension. There is no tenderness.   Musculoskeletal: She exhibits edema. She exhibits no tenderness.   Neurological: She is alert and oriented to person, place, and time. No cranial nerve deficit. She  exhibits normal muscle tone. Coordination abnormal.   Skin: Skin is warm and dry. No rash noted. She is not diaphoretic. No erythema.   Psychiatric: Thought content normal.   Nursing note and vitals reviewed.      Labs:  Recent Labs      17   2205  17   0220  17   1000   ISTATSPEC  Venous  Venous  Venous         Recent Labs      17   1205  17   0230   SODIUM  136  137   POTASSIUM  3.3*  3.8   CHLORIDE  98  107   CO2  24  24   BUN  10  5*   CREATININE  0.85  0.64   CALCIUM  9.2  8.5     Recent Labs      17   1205  17   0230   ALTSGPT  78*  53*   ASTSGOT  59*  47*   ALKPHOSPHAT  58  44   TBILIRUBIN  1.0  0.8   GLUCOSE  229*  143*     Recent Labs      17   1205  17   0230   RBC  3.91*  3.04*   HEMOGLOBIN  11.9*  9.4*   HEMATOCRIT  34.8*  27.7*   PLATELETCT  146*  139*     Recent Labs      17   1205  17   0230   WBC  5.4  3.8*   NEUTSPOLYS  63.00   --    LYMPHOCYTES  25.00   --    MONOCYTES  8.00   --    EOSINOPHILS  3.00   --    BASOPHILS  0.00   --    ASTSGOT  59*  47*   ALTSGPT  78*  53*   ALKPHOSPHAT  58  44   TBILIRUBIN  1.0  0.8           Hemodynamics:  Temp (24hrs), Av.7 °C (98 °F), Min:36 °C (96.8 °F), Max:36.9 °C (98.5 °F)  Temperature: 36 °C (96.8 °F)  Pulse  Av  Min: 96  Max: 114Heart Rate (Monitored): (!) 102  Blood Pressure: 131/74 mmHg, NIBP: 113/70 mmHg     Respiratory:    Respiration: 18, Pulse Oximetry: 93 %, O2 Daily Delivery Respiratory : Nasal Cannula     Given By:: Mouthpiece, Work Of Breathing / Effort: Mild  RUL Breath Sounds: Expiratory Wheezes, RML Breath Sounds: Clear, RLL Breath Sounds: Clear, MARY Breath Sounds: Clear, LLL Breath Sounds: Expiratory Wheezes  Fluids:    Intake/Output Summary (Last 24 hours) at 17 1736  Last data filed at 17 1656   Gross per 24 hour   Intake 3122.5 ml   Output   3375 ml   Net -252.5 ml        GI/Nutrition:  Orders Placed This Encounter   Procedures   • Diet Order     Standing  Status: Standing      Number of Occurrences: 1      Standing Expiration Date:      Order Specific Question:  Diet:     Answer:  Regular [1]     Medical Decision Making, by Problem:  Active Hospital Problems    Diagnosis   • *Septic shock (CMS-HCC) [A41.9, R65.21]  LA improving  bp stable  - ? Urinary source, recent admit/dc for uti  - repeat ua neg, u. cx neg  + proteus in past, on cefepime  - cont vanco for now, consider dc in am     • UTI (urinary tract infection) [N39.0]has chronic suprapubic catheter   • Paralysis (CMS-HCC) [G83.9]? Conversion d/o, h/o extensive w/u with Dr. Fox, ? Cryptic source??  - pt/ot  - prior h/o high dose steroids with no improvement   • HTN (hypertension) [I10]controlled   • Chronic pain syndrome [G89.4]   • Suprapubic catheter (CMS-HCC) [Z93.59]change catheter   • Schizophrenia (CMS-HCC) [F20.9]h/o   • TONYA on CPAP [G47.33]   • Hypothyroidism [E03.9]   • Anxiety [F41.9]   • Neurogenic bladder [N31.9]   • Multiple personality disorder [F44.81]   • 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]   • GERD (gastroesophageal reflux disease) [K21.9]   • Morbidly obese (CMS-HCC) [E66.01]   • Peripheral neuropathy (CMS-HCC) [G62.9]   • H/O prior ablation treatment [Z98.890]     F/u am labs    Dispo- pending  Pt/ot eval    Labs reviewed, Medications reviewed and Radiology images reviewed        DVT Prophylaxis: Enoxaparin (Lovenox)    Ulcer prophylaxis: Not indicated  Antibiotics: Treating active infection/contamination beyond 24 hours perioperative coverage  Assessed for rehab: Patient was assess for and/or received rehabilitation services during this hospitalization

## 2017-03-18 NOTE — DISCHARGE PLANNING
KRYSTIAN Valenzuela met with pt to obtain HH choice ([facility]).  KRYSTIAN faxed choice form to [facility] for referral.

## 2017-03-18 NOTE — PROGRESS NOTES
Patient c/o back, bilateral hip and knee pain 8 out of 10, oxy given will continue to monitor pain

## 2017-03-18 NOTE — PROGRESS NOTES
Medicated for back, hip, and knee pain 8/10 (see MAR). Sitting up in bed eating snack. No further needs at this time. Continue to monitor.

## 2017-03-18 NOTE — FLOWSHEET NOTE
03/18/17 0648   Events/Summary/Plan   Events/Summary/Plan called for prn svn   Interdisciplinary Plan of Care-Goals (Indications)   Obstructive Ventilatory Defect or Pulmonary Disease without Obvious Obstruction History / Diagnosis   Interdisciplinary Plan of Care-Outcomes    Bronchodilator Outcome Patient at Stable Baseline   SVN Group   #SVN Performed Yes   Given By: Mouthpiece   Incentive Spirometry Group   Incentive Spirometry Instruction Yes   Breathing Exercises Yes   Incentive Spirometer Volume 2250 mL   Respiratory WDL   Respiratory (WDL) X   Chest Exam   Work Of Breathing / Effort Mild   Respiration 20  (post 20)   Pulse 91  (post 99)   Heart Rate (Monitored) 91   Breath Sounds   Pre/Post Intervention Pre Intervention Assessment  (increased aeration after svn)   RUL Breath Sounds Diminished   RML Breath Sounds Diminished   RLL Breath Sounds Diminished   MARY Breath Sounds Diminished   LLL Breath Sounds Diminished   Secretions   Cough Congested;Non Productive   Oximetry   #Pulse Oximetry (Single Determination) Yes   Oxygen   Pulse Oximetry 96 %   O2 (LPM) 2   O2 Daily Delivery Respiratory  Nasal Cannula

## 2017-03-18 NOTE — PROGRESS NOTES
Received bedside report from Merced HASSAN. Assumed care of pt who is resting in bed, RR even/unlabored. Fall protocol in place. CLIP. Will continue to monitor.

## 2017-03-18 NOTE — CARE PLAN
Problem: Safety  Goal: Will remain free from falls  Outcome: PROGRESSING AS EXPECTED  x2 assist. CLIP. Pt calls for assist appropriately. Hourly rounding. Personal belongings within reach. Non-skid socks. Bed alarm on. Bed locked & in low position.            Problem: Skin Integrity  Goal: Risk for impaired skin integrity will decrease  Outcome: PROGRESSING AS EXPECTED  Skin intact, monitoring for breakdown. Turn and reposition Q2h & PRN. Pillows in place to float heels and for positioning.     Problem: Urinary Elimination:  Goal: Ability to reestablish a normal urinary elimination pattern will improve  Outcome: PROGRESSING SLOWER THAN EXPECTED  Suprapubic catheter in place for neurogenic bladder. Draining to gravity. No s/s infection. Monitoring I/O.        03/17/17 9591   OTHER   Urinary Elimination Catheter (Document on LDA)

## 2017-03-18 NOTE — THERAPY
"Physical Therapy Evaluation completed.   Bed Mobility:  Supine to Sit: Contact Guard Assist  Transfers: Sit to Stand: Unable to Participate  Gait: Level Of Assist: Unable to Participate   Plan of Care: Will benefit from Physical Therapy 5 times per week  Discharge Recommendations: Equipment: Removeable arm rests for wheelchair. Slide board.. Post-acute therapy recommended after discharged home.    See \"Rehab Therapy-Acute\" Patient Summary Report for complete documentation.     "

## 2017-03-18 NOTE — PROGRESS NOTES
RT notified for PRN breathing treatment per pt request. IVabt infusing. Due AM med given (see MAR). No further needs.

## 2017-03-18 NOTE — FLOWSHEET NOTE
03/17/17 2247   Events/Summary/Plan   Events/Summary/Plan PRN TXT, patient feels better post treatment   Interdisciplinary Plan of Care-Goals (Indications)   Obstructive Ventilatory Defect or Pulmonary Disease without Obvious Obstruction History / Diagnosis   Interdisciplinary Plan of Care-Outcomes    Bronchodilator Outcome Patient at Stable Baseline   Education   Education Yes - Pt. / Family has been Instructed in use of Respiratory Equipment   RT Assessment of Delivered Medications   Evaluation of Medication Delivery Daily Yes-- Pt /Family has been Instructed in use of Respiratory Medications and Adverse Reactions   SVN Group   #SVN Performed Yes   Given By: Mouthpiece   Respiratory WDL   Respiratory (WDL) X   Chest Exam   Respiration 18   Heart Rate (Monitored) 95   Breath Sounds   Pre/Post Intervention Pre Intervention Assessment   RUL Breath Sounds Clear;Diminished   RML Breath Sounds Clear;Diminished  (post exp wheezse)   RLL Breath Sounds Diminished  (post exp wheeze)   MARY Breath Sounds Clear;Diminished   LLL Breath Sounds Diminished  (post exp wheezse)   Oximetry   #Pulse Oximetry (Single Determination) Yes   Oxygen   Pulse Oximetry 94 %   O2 (LPM) 2   O2 Daily Delivery Respiratory  Nasal Cannula

## 2017-03-18 NOTE — PROGRESS NOTES
Pt sitting up in cardiac chair eating lunch. IV piggyback infusing at this time. Request pb and J sandwich. Called dietary.

## 2017-03-18 NOTE — PROGRESS NOTES
Tele strip at 1911 shows sinus tach w/ HR of 105.     Measurements: 0.18 / 0.08 / 0.34    Tele Shift Summary:     Rhythm : SR/ST  Rate : 70-100s    Ectopy : Per CCT [place], pt had no ectopy.     Telemetry monitoring strips placed in pt chart.

## 2017-03-18 NOTE — PROGRESS NOTES
"Pharmacy Kinetics 26 y.o. female on vancomycin day # 3 3/18/2017    Currently on Vancomycin 2000 mg iv q12hr    Indication for Treatment: Sepsis with unclear etiology, recent UTI    Pertinent history per medical record: Admitted on 3/16/2017 for 25 y/o paraplegic female, recent hospitalization for sepsis 2ndary to UTI. Pt. Currently on cefdinir. Pt. Started having SOB yesterday with nausea and vomiting.     Other antibiotics: Cefepime 2 gm IV Q12H    Allergies: Cefdinir; Depakote; Abilify; Amitriptyline; Amoxicillin; Ciprofloxacin; Clindamycin; Doxycycline; Ees; Flagyl; Flomax; Metformin; Sulfa drugs; Tape; Vancomycin; Cephalexin; and Levofloxacin     List concerns for renal function: BMI 45    Pertinent cultures to date:     urine enterococcus feacalis  3/9 BCp x 2 negative  3/9 urine proteus mirabilis  3/16 Resp nasal Neg flu  3/16 Periph blood Neg  3/16 Urine Neg    Recent Labs      17   1205  17   0230  17   0558   WBC  5.4  3.8*  3.8*   NEUTSPOLYS  63.00   --   45.70   BANDSSTABS  1.00   --    --      Recent Labs      17   1205  17   0230  17   0558   BUN  10  5*  6*   CREATININE  0.85  0.64  0.58   ALBUMIN  3.9  2.8*   --      Recent Labs      17   1606   VANCORANDOM  11.2     Intake/Output Summary (Last 24 hours) at 17 1328  Last data filed at 17 0600   Gross per 24 hour   Intake   2440 ml   Output   3500 ml   Net  -1060 ml      Blood pressure 130/79, pulse 93, temperature 36.7 °C (98.1 °F), resp. rate 20, height 1.6 m (5' 3\"), weight 116.9 kg (257 lb 11.5 oz), last menstrual period 2016, SpO2 98 %. Temp (24hrs), Av.6 °C (97.9 °F), Min:36 °C (96.8 °F), Max:36.9 °C (98.4 °F)      A/P   1. Vancomycin 2000 mg IV Q12H  2. Next vancomycin level: 2-3 days  3. Goal trough: 12 - 16 mcg/ml    PRINCESS Kelly PharmD      "

## 2017-03-18 NOTE — THERAPY
"Occupational Therapy Evaluation completed.   Functional Status: Andrade in place (has at home). B UE strength/AROM WNL. Diminished sensation, motor function B LE's. Seated grooming with SBA. SB transfer chair to bed with set-up, ModAx2.    Plan of Care: Patient with no further skilled OT needs in the acute care setting at this time  Discharge Recommendations:  Equipment: W/C with removable arms, SB, 3-in-1 commode, reacher.  Post-acute therapy recommended after discharged home.  Lives with grandmother and disabled family member. PTA, pt had assistance from caregiver 7x/wk, 2.5 hrs/day.  See \"Rehab Therapy-Acute\" Patient Summary Report for complete documentation.    "

## 2017-03-18 NOTE — PROGRESS NOTES
AOx4. Afebrile. C/o back, hip, and knee pain. No pain medication due at this time, turned and repositioned. Will medicate when PRN oxycodone due. Due NOC meds given (see MAR). Assessment per doc flowsheet. PIV intact & patent. IVF/IVabt infusing. SPcath intact and patent, draining to gravity. States understanding of POC. No additional concerns at this time. CLIP. Personal belongings within reach. Non-skid socks. Bed locked & in low position. Bed alarm on.

## 2017-03-18 NOTE — DISCHARGE PLANNING
Patient is [facility]. Please write home care orders upon discharge to home for continuum of care.[facility] at  /[phone] if there are any questions.

## 2017-03-18 NOTE — PROGRESS NOTES
Bedside report given to SONYA Nicole. POC discussed w/ pt. Lines patent. Fall precautions in place.

## 2017-03-18 NOTE — PROGRESS NOTES
Assumed care of patient. Bedside report from SONYA Nicole. POC discussed w/ pt. Lines patent. CLIP. Fall precautions in place.

## 2017-03-18 NOTE — PROGRESS NOTES
Medicated for back/hip/knee pain 8/10 and hot pack placed to lower back (see MAR). No further needs at this time. Continue to monitor.

## 2017-03-18 NOTE — PROGRESS NOTES
"Pharmacy Kinetics 26 y.o. female on vancomycin day # 2 3/17/2017    Currently on Vancomycin 2000 mg iv given this am at 0416    Indication for Treatment: Sepsis unclear etiology, recent UTI    Pertinent history per medical record: Admitted on 3/16/2017 for 27 y/o paraplegic female, recent hospitalization for sepsis 2ndary to UTI. Pt. Currently on cefdinir. Pt. Started having SOB yesterday with nausea and vomiting.     Other antibiotics: Cefepime 2 Gm IV [id number]    Allergies: Cefdinir; Depakote; Abilify; Amitriptyline; Amoxicillin; Ciprofloxacin; Clindamycin; Doxycycline; Ees; Flagyl; Flomax; Metformin; Sulfa drugs; Tape; Vancomycin; Cephalexin; and Levofloxacin     List concerns for renal function: BMI 45    Pertinent cultures to date:     urine enterococcus feacalis  3/9 BCp x 2 negative  3/9 urine proteus mirabilis  3/16 Resp nasal Neg flu  3/16 Periph blood Neg  3/16 Urine Neg    Recent Labs      17   1205  17   0230   WBC  5.4  3.8*   NEUTSPOLYS  63.00   --    BANDSSTABS  1.00   --      Recent Labs      17   1205  17   0230   BUN  10  5*   CREATININE  0.85  0.64   ALBUMIN  3.9  2.8*     Recent Labs      17   1606   VANCORANDOM  11.2     Intake/Output Summary (Last 24 hours) at 17 1731  Last data filed at 17 1656   Gross per 24 hour   Intake 3122.5 ml   Output   3375 ml   Net -252.5 ml      Blood pressure 131/74, pulse 106, temperature 36 °C (96.8 °F), resp. rate 18, height 1.6 m (5' 3\"), weight 116.9 kg (257 lb 11.5 oz), last menstrual period 2016, SpO2 93 %. Temp (24hrs), Av.7 °C (98 °F), Min:36 °C (96.8 °F), Max:36.9 °C (98.5 °F)      A/P   1. Vancomycin dose change: 2000 mg IV Q12H  2. Next vancomycin level: 3/19/17  3. Goal trough: 12-16 mcg/ml    PRINCESS Kelly PharmD      "

## 2017-03-18 NOTE — PROGRESS NOTES
Pt states pain improved but is now back at 8/10. Used pillows for repositioning and comfort. Medicated for nausea (see MAR). No further needs at this time. Continue to monitor.

## 2017-03-18 NOTE — FLOWSHEET NOTE
03/18/17 1345   Events/Summary/Plan   Events/Summary/Plan PRN svn given   Interdisciplinary Plan of Care-Goals (Indications)   Obstructive Ventilatory Defect or Pulmonary Disease without Obvious Obstruction History / Diagnosis   Interdisciplinary Plan of Care-Outcomes    Bronchodilator Outcome Patient at Stable Baseline   SVN Group   #SVN Performed Yes   Given By: Mouthpiece   Incentive Spirometry Group   Breathing Exercises Yes   Incentive Spirometer Volume 2250 mL   Chest Exam   Work Of Breathing / Effort Mild   Respiration 20  (post 20)   Pulse 90  (post 101)   Breath Sounds   Pre/Post Intervention Pre Intervention Assessment  (increased aeration after svn)   RUL Breath Sounds Diminished   RML Breath Sounds Diminished   RLL Breath Sounds Diminished   MARY Breath Sounds Diminished   LLL Breath Sounds Diminished   Secretions   Cough Congested;Non Productive   Oximetry   #Pulse Oximetry (Single Determination) Yes   Oxygen   Pulse Oximetry 95 %   O2 (LPM) 2   O2 Daily Delivery Respiratory  Nasal Cannula

## 2017-03-18 NOTE — PROGRESS NOTES
"0300 -- Pt states \"I feel sick, I don't feel good.\" Pt described symptoms as \"dizzy\" and \"woozy\". Pt had just woken up from sleep. Vital signs checked and were WNL (see flowsheet). Requested blood glucose to be check. FSBS 134. After calming pt, she said \"I think I just need some sleep.\" Made comfortable. Will monitor.  "

## 2017-03-19 LAB
GLUCOSE BLD-MCNC: 352 MG/DL (ref 65–99)
GLUCOSE BLD-MCNC: 361 MG/DL (ref 65–99)

## 2017-03-19 PROCEDURE — 700111 HCHG RX REV CODE 636 W/ 250 OVERRIDE (IP): Performed by: HOSPITALIST

## 2017-03-19 PROCEDURE — 94760 N-INVAS EAR/PLS OXIMETRY 1: CPT

## 2017-03-19 PROCEDURE — 700105 HCHG RX REV CODE 258: Performed by: INTERNAL MEDICINE

## 2017-03-19 PROCEDURE — 700102 HCHG RX REV CODE 250 W/ 637 OVERRIDE(OP): Performed by: INTERNAL MEDICINE

## 2017-03-19 PROCEDURE — 82962 GLUCOSE BLOOD TEST: CPT | Mod: 91

## 2017-03-19 PROCEDURE — 99233 SBSQ HOSP IP/OBS HIGH 50: CPT | Performed by: INTERNAL MEDICINE

## 2017-03-19 PROCEDURE — 770020 HCHG ROOM/CARE - TELE (206)

## 2017-03-19 PROCEDURE — 665999 HH PPS REVENUE DEBIT

## 2017-03-19 PROCEDURE — A9270 NON-COVERED ITEM OR SERVICE: HCPCS | Performed by: HOSPITALIST

## 2017-03-19 PROCEDURE — 700102 HCHG RX REV CODE 250 W/ 637 OVERRIDE(OP): Performed by: HOSPITALIST

## 2017-03-19 PROCEDURE — 700101 HCHG RX REV CODE 250: Performed by: HOSPITALIST

## 2017-03-19 PROCEDURE — 700111 HCHG RX REV CODE 636 W/ 250 OVERRIDE (IP): Performed by: INTERNAL MEDICINE

## 2017-03-19 PROCEDURE — 94640 AIRWAY INHALATION TREATMENT: CPT

## 2017-03-19 PROCEDURE — A9270 NON-COVERED ITEM OR SERVICE: HCPCS | Performed by: INTERNAL MEDICINE

## 2017-03-19 PROCEDURE — 665998 HH PPS REVENUE CREDIT

## 2017-03-19 RX ORDER — LACTULOSE 20 G/30ML
30 SOLUTION ORAL 2 TIMES DAILY
Status: DISCONTINUED | OUTPATIENT
Start: 2017-03-19 | End: 2017-03-20 | Stop reason: HOSPADM

## 2017-03-19 RX ORDER — CIPROFLOXACIN 500 MG/1
500 TABLET, FILM COATED ORAL EVERY 12 HOURS
Qty: 14 TAB | Refills: 0 | Status: SHIPPED | DISCHARGE
Start: 2017-03-19 | End: 2017-04-04 | Stop reason: SDUPTHER

## 2017-03-19 RX ORDER — OXYCODONE HYDROCHLORIDE 5 MG/1
5 TABLET ORAL EVERY 4 HOURS PRN
Qty: 30 TAB | Refills: 0 | Status: SHIPPED | DISCHARGE
Start: 2017-03-19 | End: 2017-04-12

## 2017-03-19 RX ORDER — CIPROFLOXACIN 500 MG/1
500 TABLET, FILM COATED ORAL EVERY 12 HOURS
Status: DISCONTINUED | OUTPATIENT
Start: 2017-03-19 | End: 2017-03-20 | Stop reason: HOSPADM

## 2017-03-19 RX ORDER — DEXTROSE MONOHYDRATE 25 G/50ML
25 INJECTION, SOLUTION INTRAVENOUS
Status: DISCONTINUED | OUTPATIENT
Start: 2017-03-19 | End: 2017-03-20 | Stop reason: HOSPADM

## 2017-03-19 RX ADMIN — CIPROFLOXACIN HYDROCHLORIDE 500 MG: 500 TABLET, FILM COATED ORAL at 21:36

## 2017-03-19 RX ADMIN — FLUOXETINE 10 MG: 10 CAPSULE ORAL at 09:16

## 2017-03-19 RX ADMIN — OXYCODONE HYDROCHLORIDE 10 MG: 10 TABLET ORAL at 02:56

## 2017-03-19 RX ADMIN — NYSTATIN 1500000 UNITS: 100000 POWDER TOPICAL at 21:42

## 2017-03-19 RX ADMIN — VANCOMYCIN HYDROCHLORIDE 2000 MG: 10 INJECTION, POWDER, LYOPHILIZED, FOR SOLUTION INTRAVENOUS at 05:54

## 2017-03-19 RX ADMIN — LACTULOSE 30 ML: 10 SOLUTION ORAL at 21:36

## 2017-03-19 RX ADMIN — CIPROFLOXACIN HYDROCHLORIDE 500 MG: 500 TABLET, FILM COATED ORAL at 09:16

## 2017-03-19 RX ADMIN — SENNOSIDES AND DOCUSATE SODIUM 2 TABLET: 8.6; 5 TABLET ORAL at 09:16

## 2017-03-19 RX ADMIN — INSULIN LISPRO 5 UNITS: 100 INJECTION, SOLUTION INTRAVENOUS; SUBCUTANEOUS at 17:20

## 2017-03-19 RX ADMIN — ZIPRASIDONE HYDROCHLORIDE 160 MG: 20 CAPSULE ORAL at 17:23

## 2017-03-19 RX ADMIN — LEVOTHYROXINE SODIUM 75 MCG: 75 TABLET ORAL at 05:54

## 2017-03-19 RX ADMIN — ALBUTEROL SULFATE 2.5 MG: 2.5 SOLUTION RESPIRATORY (INHALATION) at 07:26

## 2017-03-19 RX ADMIN — GABAPENTIN 600 MG: 300 CAPSULE ORAL at 09:16

## 2017-03-19 RX ADMIN — INSULIN LISPRO 5 UNITS: 100 INJECTION, SOLUTION INTRAVENOUS; SUBCUTANEOUS at 21:39

## 2017-03-19 RX ADMIN — FENTANYL 1 PATCH: 25 PATCH TRANSDERMAL at 14:37

## 2017-03-19 RX ADMIN — ENOXAPARIN SODIUM 40 MG: 100 INJECTION SUBCUTANEOUS at 09:15

## 2017-03-19 RX ADMIN — NYSTATIN 1500000 UNITS: 100000 POWDER TOPICAL at 09:35

## 2017-03-19 RX ADMIN — OXYCODONE HYDROCHLORIDE 10 MG: 10 TABLET ORAL at 15:05

## 2017-03-19 RX ADMIN — SODIUM CHLORIDE 1000 MG: 9 INJECTION, SOLUTION INTRAVENOUS at 09:35

## 2017-03-19 RX ADMIN — GABAPENTIN 1200 MG: 400 CAPSULE ORAL at 21:35

## 2017-03-19 RX ADMIN — NYSTATIN 1500000 UNITS: 100000 POWDER TOPICAL at 14:38

## 2017-03-19 RX ADMIN — OXYCODONE HYDROCHLORIDE 10 MG: 10 TABLET ORAL at 10:43

## 2017-03-19 RX ADMIN — SENNOSIDES AND DOCUSATE SODIUM 2 TABLET: 8.6; 5 TABLET ORAL at 21:35

## 2017-03-19 RX ADMIN — PROMETHAZINE HYDROCHLORIDE 25 MG: 25 TABLET ORAL at 15:31

## 2017-03-19 RX ADMIN — ATORVASTATIN CALCIUM 40 MG: 40 TABLET, FILM COATED ORAL at 21:36

## 2017-03-19 RX ADMIN — INSULIN LISPRO 6 UNITS: 100 INJECTION, SOLUTION INTRAVENOUS; SUBCUTANEOUS at 12:07

## 2017-03-19 RX ADMIN — ALBUTEROL SULFATE 2.5 MG: 2.5 SOLUTION RESPIRATORY (INHALATION) at 14:09

## 2017-03-19 ASSESSMENT — ENCOUNTER SYMPTOMS
FEVER: 0
WEAKNESS: 1
CHILLS: 0
FOCAL WEAKNESS: 1
SHORTNESS OF BREATH: 0
NERVOUS/ANXIOUS: 1
MYALGIAS: 1
NAUSEA: 0
DEPRESSION: 0
HEADACHES: 0
SENSORY CHANGE: 1
CLAUDICATION: 0
SPEECH CHANGE: 0
COUGH: 0
TREMORS: 0
MEMORY LOSS: 0
FLANK PAIN: 0
BACK PAIN: 1
ABDOMINAL PAIN: 0
NECK PAIN: 0

## 2017-03-19 ASSESSMENT — PAIN SCALES - GENERAL
PAINLEVEL_OUTOF10: 8
PAINLEVEL_OUTOF10: 9
PAINLEVEL_OUTOF10: 8
PAINLEVEL_OUTOF10: 9

## 2017-03-19 NOTE — FLOWSHEET NOTE
03/19/17 0727   Events/Summary/Plan   Non-Invasive Resp Device Site Inspection Completed Intact   Interdisciplinary Plan of Care-Goals (Indications)   Obstructive Ventilatory Defect or Pulmonary Disease without Obvious Obstruction History / Diagnosis   Interdisciplinary Plan of Care-Outcomes    Bronchodilator Outcome Patient at Stable Baseline   Education   Education Yes - Pt. / Family has been Instructed in use of Respiratory Medications and Adverse Reactions   RT Assessment of Delivered Medications   Evaluation of Medication Delivery Daily Yes-- Pt /Family has been Instructed in use of Respiratory Medications and Adverse Reactions   SVN Group   #SVN Performed Yes   Given By: Mouthpiece   Date SVN Next Change Due (Q 7 Days) 03/25/17   Chest Exam   Respiration 20   Pulse 76   Breath Sounds   RUL Breath Sounds Clear   RML Breath Sounds Clear   RLL Breath Sounds Clear   AMRY Breath Sounds Clear   LLL Breath Sounds Clear   Secretions   Cough Dry   Oximetry   #Pulse Oximetry (Single Determination) Yes   Oxygen   Pulse Oximetry 95 %   O2 (LPM) 2   O2 Daily Delivery Respiratory  Nasal Cannula

## 2017-03-19 NOTE — PROGRESS NOTES
Nursing Coordinator - contact made with pt on behalf of primary RN for IV start only.  22G placed to R thumb x1 attempt.  Well tolerated.  Report to Mona HASSAN

## 2017-03-19 NOTE — PROGRESS NOTES
Hospital Medicine Progress Note, Adult, Complex               Author: Floridalma Baxter Date & Time created: 3/19/2017  3:16 PM     Interval History:  26F with recent dc for uti returns with lactic acidosis and unclear etiology of septic shock    3/17  - bp stable, feels better, neg n/v/d, + ongoing LE weakness  3/18  - some movement in LE, denies pain, tolerating diet  3/19 - improving LE weakness, reports chronic lactulose use, took her own home rx today, + wiggling her feet, pivot stand    Review of Systems:  Review of Systems   Constitutional: Negative for fever and chills.   HENT: Negative for hearing loss.    Respiratory: Negative for cough and shortness of breath.    Cardiovascular: Positive for leg swelling. Negative for claudication.   Gastrointestinal: Negative for nausea and abdominal pain.   Genitourinary: Negative for dysuria, frequency and flank pain.   Musculoskeletal: Positive for myalgias and back pain. Negative for neck pain.   Neurological: Positive for sensory change, focal weakness and weakness. Negative for tremors, speech change and headaches.   Psychiatric/Behavioral: Negative for depression and memory loss. The patient is nervous/anxious.        Physical Exam:  Physical Exam   Constitutional: She is oriented to person, place, and time. She appears well-nourished. No distress.   Morbid obesity   HENT:   Head: Normocephalic and atraumatic.   Eyes: EOM are normal. Pupils are equal, round, and reactive to light. No scleral icterus.   Neck: Normal range of motion. Neck supple. No thyromegaly present.   Cardiovascular: Normal rate and intact distal pulses.    Pulmonary/Chest: Effort normal. No respiratory distress.   Abdominal: Soft. Bowel sounds are normal. She exhibits no distension.   Musculoskeletal: She exhibits edema. She exhibits no tenderness.   Neurological: She is alert and oriented to person, place, and time. No cranial nerve deficit. Coordination abnormal.   Skin: Skin is warm and dry. No  rash noted. No erythema.   Psychiatric: Thought content normal.   Nursing note and vitals reviewed.      Labs:  Recent Labs      17   2205  17   0220  17   1000   ISTATSPEC  Venous  Venous  Venous         Recent Labs      17   02317   0558   SODIUM  137  137   POTASSIUM  3.8  4.5   CHLORIDE  107  103   CO2  24  25   BUN  5*  6*   CREATININE  0.64  0.58   CALCIUM  8.5  9.2     Recent Labs      17   02317   0558   ALTSGPT  53*   --    ASTSGOT  47*   --    ALKPHOSPHAT  44   --    TBILIRUBIN  0.8   --    GLUCOSE  143*  116*     Recent Labs      17   0558   RBC  3.04*  3.19*   HEMOGLOBIN  9.4*  9.8*   HEMATOCRIT  27.7*  29.2*   PLATELETCT  139*  151*     Recent Labs      17   0558   WBC  3.8*  3.8*   NEUTSPOLYS   --   45.70   LYMPHOCYTES   --   42.30*   MONOCYTES   --   6.40   EOSINOPHILS   --   5.30   BASOPHILS   --   0.00   ASTSGOT  47*   --    ALTSGPT  53*   --    ALKPHOSPHAT  44   --    TBILIRUBIN  0.8   --            Hemodynamics:  Temp (24hrs), Av.7 °C (98 °F), Min:36.4 °C (97.5 °F), Max:37.1 °C (98.7 °F)  Temperature: 36.5 °C (97.7 °F)  Pulse  Av.9  Min: 76  Max: 114Heart Rate (Monitored): 81  Blood Pressure: 132/67 mmHg     Respiratory:    Respiration: 18 (post 18), Pulse Oximetry: 97 %, O2 Daily Delivery Respiratory : Nasal Cannula     Given By:: Mask (pt states she is tierd), Work Of Breathing / Effort: Mild;Shallow  RUL Breath Sounds: Diminished, RML Breath Sounds: Diminished, RLL Breath Sounds: Diminished, MARY Breath Sounds: Diminished, LLL Breath Sounds: Diminished  Fluids:    Intake/Output Summary (Last 24 hours) at 17 1516  Last data filed at 17 0700   Gross per 24 hour   Intake    710 ml   Output   1750 ml   Net  -1040 ml        GI/Nutrition:  Orders Placed This Encounter   Procedures   • Diet Order     Standing Status: Standing      Number of Occurrences: 1      Standing Expiration Date:       Order Specific Question:  Diet:     Answer:  Regular [1]     Medical Decision Making, by Problem:  Active Hospital Problems    Diagnosis   • *Septic shock (CMS-HCC) [A41.9, R65.21]resolved  LA improving  bp stable  - ? Urinary source, recent admit/dc for uti  - repeat ua neg, u. cx neg  + proteus in past, dc cefepime  - dc vanco   - change to po cipro x 5 day course     • UTI (urinary tract infection) [N39.0]has chronic suprapubic catheter   • Paralysis (CMS-HCC) [G83.9]? Conversion d/o, h/o extensive w/u with Dr. Fox, ? Cryptic source??-    - pt/ot- improving LE strength, moving her feet  - has home health  - prior h/o high dose steroids, intermittent use  - d/w neuro, Dr. 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]   • GERD (gastroesophageal reflux disease) [K21.9]   • Morbidly obese (CMS-HCC) [E66.01]   • Peripheral neuropathy (CMS-HCC) [G62.9]   • H/O prior ablation treatment [Z98.890]         Dispo- to home vs snf in 2-3 days with clinical improvement  SW to assist  snf referral    Labs reviewed, Medications reviewed and Radiology images reviewed        DVT Prophylaxis: Enoxaparin (Lovenox)    Ulcer prophylaxis: Not indicated  Antibiotics: Treating active infection/contamination beyond 24 hours perioperative coverage  Assessed for rehab: Patient was assess for and/or received rehabilitation services during this hospitalization

## 2017-03-19 NOTE — PROGRESS NOTES
Due NOC meds given. Medicated for back/hip/knee pain 8/10 (see MAR). FSBS 361, coverage given per sliding scale. No further needs. Resting comfortably. Will monitor.

## 2017-03-19 NOTE — PROGRESS NOTES
AOx4. Afebrile. C/o back/hip/knee pain, repositioned for comfort and heat packs offered. Assessment per doc flowsheet. PIV intact & patent. IVF infusing TKO. States understanding of POC. No additional concerns at this time. Will come back for NOC meds. CLIP. Personal belongings within reach. Non-skid socks. Bed locked & in low position.

## 2017-03-19 NOTE — PROGRESS NOTES
Hospital Medicine Progress Note, Adult, Complex               Author: Floridalma Baxter Date & Time created: 3/18/2017  6:45 PM     Interval History:  26F with recent dc for uti returns with lactic acidosis and unclear etiology of septic shock    3/17  - bp stable, feels better, neg n/v/d, + ongoing LE weakness  3/18  - some movement in LE, denies pain, tolerating diet    Review of Systems:  Review of Systems   Constitutional: Negative for fever and chills.   HENT: Negative for congestion and hearing loss.    Respiratory: Negative for cough and shortness of breath.    Cardiovascular: Positive for leg swelling. Negative for palpitations and claudication.   Gastrointestinal: Negative for nausea and abdominal pain.   Genitourinary: Negative for dysuria and flank pain.   Musculoskeletal: Positive for myalgias and back pain.   Neurological: Positive for sensory change, focal weakness and weakness. Negative for dizziness, tremors and speech change.   Psychiatric/Behavioral: Negative for memory loss. The patient is nervous/anxious.        Physical Exam:  Physical Exam   Constitutional: She is oriented to person, place, and time. She appears well-nourished. No distress.   Morbid obesity   HENT:   Head: Normocephalic and atraumatic.   Eyes: EOM are normal. Pupils are equal, round, and reactive to light.   Neck: Normal range of motion. No JVD present. No thyromegaly present.   Cardiovascular: Normal rate and intact distal pulses.    Pulmonary/Chest: Effort normal. No respiratory distress. She has no wheezes. She has no rales.   Abdominal: Soft. Bowel sounds are normal. She exhibits no distension. There is no tenderness.   Musculoskeletal: She exhibits edema.   Neurological: She is alert and oriented to person, place, and time. No cranial nerve deficit. She exhibits normal muscle tone. Coordination abnormal.   Skin: Skin is warm and dry. She is not diaphoretic.   Psychiatric: Thought content normal.   Nursing note and vitals  reviewed.      Labs:  Recent Labs      17   2205  17   0220  17   1000   ISTATSPEC  Venous  Venous  Venous         Recent Labs      17   12017   0230  17   0558   SODIUM  136  137  137   POTASSIUM  3.3*  3.8  4.5   CHLORIDE  98  107  103   CO2  24  24  25   BUN  10  5*  6*   CREATININE  0.85  0.64  0.58   CALCIUM  9.2  8.5  9.2     Recent Labs      17   12017   0230  17   0558   ALTSGPT  78*  53*   --    ASTSGOT  59*  47*   --    ALKPHOSPHAT  58  44   --    TBILIRUBIN  1.0  0.8   --    GLUCOSE  229*  143*  116*     Recent Labs      17   12017   0230  17   0558   RBC  3.91*  3.04*  3.19*   HEMOGLOBIN  11.9*  9.4*  9.8*   HEMATOCRIT  34.8*  27.7*  29.2*   PLATELETCT  146*  139*  151*     Recent Labs      17   12017   0230  17   0558   WBC  5.4  3.8*  3.8*   NEUTSPOLYS  63.00   --   45.70   LYMPHOCYTES  25.00   --   42.30*   MONOCYTES  8.00   --   6.40   EOSINOPHILS  3.00   --   5.30   BASOPHILS  0.00   --   0.00   ASTSGOT  59*  47*   --    ALTSGPT  78*  53*   --    ALKPHOSPHAT  58  44   --    TBILIRUBIN  1.0  0.8   --            Hemodynamics:  Temp (24hrs), Av.8 °C (98.2 °F), Min:36.6 °C (97.8 °F), Max:37.1 °C (98.7 °F)  Temperature: 37.1 °C (98.7 °F)  Pulse  Av.3  Min: 90  Max: 114Heart Rate (Monitored): 91  Blood Pressure: 126/75 mmHg     Respiratory:    Respiration: 20, Pulse Oximetry: 93 %, O2 Daily Delivery Respiratory : Nasal Cannula     Given By:: Mouthpiece, Work Of Breathing / Effort: Mild  RUL Breath Sounds: Diminished, RML Breath Sounds: Diminished, RLL Breath Sounds: Diminished, MARY Breath Sounds: Diminished, LLL Breath Sounds: Diminished  Fluids:    Intake/Output Summary (Last 24 hours) at 17 1845  Last data filed at 17 0600   Gross per 24 hour   Intake    470 ml   Output   2300 ml   Net  -1830 ml        GI/Nutrition:  Orders Placed This Encounter   Procedures   • Diet Order      Standing Status: Standing      Number of Occurrences: 1      Standing Expiration Date:      Order Specific Question:  Diet:     Answer:  Regular [1]     Medical Decision Making, by Problem:  Active Hospital Problems    Diagnosis   • *Septic shock (CMS-HCC) [A41.9, R65.21]resolved  LA improving  bp stable  - ? Urinary source, recent admit/dc for uti  - repeat ua neg, u. cx neg  + proteus in past, on cefepime  - dc vanco      • UTI (urinary tract infection) [N39.0]has chronic suprapubic catheter   • Paralysis (CMS-HCC) [G83.9]? Conversion d/o, h/o extensive w/u with Dr. Fox, ? Cryptic source??- on exam able to wiggle her toes, + reflexes  - pt/ot- unable to participate, appears near baseline  - has home health  - prior h/o high dose steroids, intermittent use  - d/w neuro, Dr. Reno, ok to resume trial of HD steroids  - pt/ot, - unable to partipate, cont    • HTN (hypertension) [I10]controlled   • Chronic pain syndrome [G89.4]   • Suprapubic catheter (CMS-HCC) [Z93.59]change catheter   • Schizophrenia (CMS-HCC) [F20.9]h/o   • TONYA on CPAP [G47.33]   • Hypothyroidism [E03.9]   • Anxiety [F41.9]   • Neurogenic bladder [N31.9]   • Multiple personality disorder [F44.81]   • 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]   • GERD (gastroesophageal reflux disease) [K21.9]   • Morbidly obese (CMS-HCC) [E66.01]   • Peripheral neuropathy (CMS-HCC) [G62.9]   • H/O prior ablation treatment [Z98.890]         Dispo- to home vs snf in 2-3 days with clinical improvement  SW to assist  snf referral    Labs reviewed, Medications reviewed and Radiology images reviewed        DVT Prophylaxis: Enoxaparin (Lovenox)    Ulcer prophylaxis: Not indicated  Antibiotics: Treating active infection/contamination beyond 24 hours perioperative coverage  Assessed for rehab: Patient was assess for and/or received rehabilitation services during this hospitalization

## 2017-03-19 NOTE — FLOWSHEET NOTE
03/19/17 1414   Events/Summary/Plan   Events/Summary/Plan PRN svn given   Interdisciplinary Plan of Care-Goals (Indications)   Obstructive Ventilatory Defect or Pulmonary Disease without Obvious Obstruction History / Diagnosis   Interdisciplinary Plan of Care-Outcomes    Bronchodilator Outcome Patient at Stable Baseline   SVN Group   #SVN Performed Yes   Given By: Mask  (pt states she is tierd)   Chest Exam   Work Of Breathing / Effort Mild;Shallow   Respiration 18  (post 18)   Pulse 81  (post 91)   Heart Rate (Monitored) 81   Breath Sounds   Pre/Post Intervention Pre Intervention Assessment  (increased aeration after svn)   RUL Breath Sounds Diminished   RML Breath Sounds Diminished   RLL Breath Sounds Diminished   MARY Breath Sounds Diminished   LLL Breath Sounds Diminished   Oximetry   #Pulse Oximetry (Single Determination) Yes   Oxygen   Pulse Oximetry 97 %   O2 (LPM) 2.5   O2 Daily Delivery Respiratory  Nasal Cannula

## 2017-03-19 NOTE — PROGRESS NOTES
FSBS 361, coverage given per sliding scale. Due am meds given (see MAR). Transferred to cardiac chair with 2 person assist and slide board. Made comfortable. No further needs.

## 2017-03-19 NOTE — DISCHARGE PLANNING
Medical SW    Referral: Sw to acquire SNF choice from pt per unit Sw and AM rounds.    Intervention: Sw met w/ pt and her mother at bedside. Pt and mother report pt has not been accepted to SNFs in past. Pt requests a blanket referral be placed to all local SNFs. Pt signed and dated choice forms. Sw faxed to Maco LANIER. Sw received fax confirmation and spoke to Maco via phone. Referrals will be sent to all local SNFs.     Plan: Sw to assist w/ d/c planning as needed.

## 2017-03-19 NOTE — PROGRESS NOTES
Pt transferred back to bed from cardiac chair using slide board and with 2-person assist. Tolerated well. Bed pan given.

## 2017-03-19 NOTE — PROGRESS NOTES
Tele strip at 1932 shows sinus tach w/ HR of 101.     Measurements: 0.12 / 0.10 / 0.40    Tele Shift Summary:    Rhythm : SR/ST  Rate : 70-100s    Ectopy : Per CCT Tatyana, pt had no ectopy.     Telemetry monitoring strips placed in pt chart.

## 2017-03-19 NOTE — FLOWSHEET NOTE
03/18/17 1900   Events/Summary/Plan   Events/Summary/Plan PRN given, stated that they're on 2L at home.   Interdisciplinary Plan of Care-Goals (Indications)   Obstructive Ventilatory Defect or Pulmonary Disease without Obvious Obstruction History / Diagnosis   Interdisciplinary Plan of Care-Outcomes    Bronchodilator Outcome Patient at Stable Baseline   Education   Education Yes - Pt. / Family has been Instructed in use of Respiratory Equipment   RT Assessment of Delivered Medications   Evaluation of Medication Delivery Daily Yes-- Pt /Family has been Instructed in use of Respiratory Medications and Adverse Reactions   SVN Group   #SVN Performed Yes   Given By: Mouthpiece   Incentive Spirometry Group   Incentive Spirometry Instruction Yes   Breathing Exercises Yes   Incentive Spirometer Volume 1750 mL  (to 2000\)   Respiratory WDL   Respiratory (WDL) X   Chest Exam   Respiration 18   Heart Rate (Monitored) 95   Breath Sounds   Pre/Post Intervention Pre Intervention Assessment   RUL Breath Sounds Clear;Diminished   RML Breath Sounds Clear;Diminished   RLL Breath Sounds Diminished   MARY Breath Sounds Clear;Diminished   LLL Breath Sounds Diminished   Oximetry   #Pulse Oximetry (Single Determination) Yes   Oxygen   Pulse Oximetry 95 %   O2 (LPM) 2   O2 Daily Delivery Respiratory  Nasal Cannula   Patient also stated that they cough up mucus 3x after last treatment and feels much better post treatment with minimal aeratoin increase

## 2017-03-19 NOTE — PROGRESS NOTES
Assumed care of patient. Bedside report from SONYA Dunn. POC discussed w/ pt. Lines patent. CLIP. Fall precautions in place.

## 2017-03-19 NOTE — PROGRESS NOTES
Patients XQ=[id number] patient told me upon bs check that her grandma gave her lactulose from home mixed in a chocolate boost from home. Patients grandma also brought patient her bowel stimulator from home which patient has going at 1.2 she states. Explained to patient that she cannot take her own meds from home. Cassy, hospitalist RN notified

## 2017-03-19 NOTE — PROGRESS NOTES
Bedside report given to SONYA Dunn. POC discussed w/ pt. Lines patent. Fall precautions in place.

## 2017-03-19 NOTE — PROGRESS NOTES
Pt's tele summary: sinus rhythm w/no ectopy.    HR 79      MD interval 0.12  QRS complex 0.10  QT interval 0.38      Primary nurse to perform monitoring and keep in communication with monitor tech.

## 2017-03-19 NOTE — PROGRESS NOTES
Problem: Safety  Goal: Will remain free from falls  Outcome: PROGRESSING AS EXPECTED  x2 assist, slide board transfers. CLIP. Pt calls for assist appropriately. Hourly rounding. Personal belongings within reach. Non-skid socks. Bed alarm on. Bed locked & in low position.    Problem: Skin Integrity  Goal: Risk for impaired skin integrity will decrease  Outcome: PROGRESSING AS EXPECTED  Skin intact, monitoring for breakdown. Turn and reposition Q2h & PRN. Pillows in place to float heels and for positioning.     Problem: Urinary Elimination:  Goal: Ability to reestablish a normal urinary elimination pattern will improve  Outcome: PROGRESSING SLOWER THAN EXPECTED  Suprapubic catheter in place for neurogenic bladder. Draining to gravity. No s/s infection. Monitoring I/O.

## 2017-03-20 VITALS
BODY MASS INDEX: 45.66 KG/M2 | SYSTOLIC BLOOD PRESSURE: 142 MMHG | DIASTOLIC BLOOD PRESSURE: 76 MMHG | WEIGHT: 257.72 LBS | OXYGEN SATURATION: 95 % | HEART RATE: 78 BPM | TEMPERATURE: 97.9 F | HEIGHT: 63 IN | RESPIRATION RATE: 16 BRPM

## 2017-03-20 LAB
GLUCOSE BLD-MCNC: 253 MG/DL (ref 65–99)
GLUCOSE BLD-MCNC: 282 MG/DL (ref 65–99)
GLUCOSE BLD-MCNC: 345 MG/DL (ref 65–99)
GLUCOSE BLD-MCNC: 355 MG/DL (ref 65–99)
GLUCOSE BLD-MCNC: 373 MG/DL (ref 65–99)
GLUCOSE BLD-MCNC: 420 MG/DL (ref 65–99)

## 2017-03-20 PROCEDURE — 82962 GLUCOSE BLOOD TEST: CPT | Mod: 91

## 2017-03-20 PROCEDURE — 700105 HCHG RX REV CODE 258: Performed by: INTERNAL MEDICINE

## 2017-03-20 PROCEDURE — 665998 HH PPS REVENUE CREDIT

## 2017-03-20 PROCEDURE — 700102 HCHG RX REV CODE 250 W/ 637 OVERRIDE(OP): Performed by: INTERNAL MEDICINE

## 2017-03-20 PROCEDURE — 700101 HCHG RX REV CODE 250: Performed by: HOSPITALIST

## 2017-03-20 PROCEDURE — 94640 AIRWAY INHALATION TREATMENT: CPT

## 2017-03-20 PROCEDURE — 700111 HCHG RX REV CODE 636 W/ 250 OVERRIDE (IP): Performed by: INTERNAL MEDICINE

## 2017-03-20 PROCEDURE — 665999 HH PPS REVENUE DEBIT

## 2017-03-20 PROCEDURE — A9270 NON-COVERED ITEM OR SERVICE: HCPCS | Performed by: INTERNAL MEDICINE

## 2017-03-20 PROCEDURE — A9270 NON-COVERED ITEM OR SERVICE: HCPCS | Performed by: HOSPITALIST

## 2017-03-20 PROCEDURE — 700102 HCHG RX REV CODE 250 W/ 637 OVERRIDE(OP): Performed by: HOSPITALIST

## 2017-03-20 PROCEDURE — 700111 HCHG RX REV CODE 636 W/ 250 OVERRIDE (IP): Performed by: HOSPITALIST

## 2017-03-20 PROCEDURE — 99239 HOSP IP/OBS DSCHRG MGMT >30: CPT | Performed by: INTERNAL MEDICINE

## 2017-03-20 RX ADMIN — INSULIN LISPRO 3 UNITS: 100 INJECTION, SOLUTION INTRAVENOUS; SUBCUTANEOUS at 12:00

## 2017-03-20 RX ADMIN — INSULIN LISPRO 3 UNITS: 100 INJECTION, SOLUTION INTRAVENOUS; SUBCUTANEOUS at 06:23

## 2017-03-20 RX ADMIN — GABAPENTIN 600 MG: 300 CAPSULE ORAL at 08:18

## 2017-03-20 RX ADMIN — CIPROFLOXACIN HYDROCHLORIDE 500 MG: 500 TABLET, FILM COATED ORAL at 08:18

## 2017-03-20 RX ADMIN — NYSTATIN 1500000 UNITS: 100000 POWDER TOPICAL at 17:53

## 2017-03-20 RX ADMIN — NYSTATIN 1500000 UNITS: 100000 POWDER TOPICAL at 08:19

## 2017-03-20 RX ADMIN — ZIPRASIDONE HYDROCHLORIDE 160 MG: 20 CAPSULE ORAL at 17:51

## 2017-03-20 RX ADMIN — FLUOXETINE 10 MG: 10 CAPSULE ORAL at 08:18

## 2017-03-20 RX ADMIN — OXYCODONE HYDROCHLORIDE 10 MG: 10 TABLET ORAL at 01:56

## 2017-03-20 RX ADMIN — ALBUTEROL SULFATE 2.5 MG: 2.5 SOLUTION RESPIRATORY (INHALATION) at 14:41

## 2017-03-20 RX ADMIN — SODIUM CHLORIDE 1000 MG: 9 INJECTION, SOLUTION INTRAVENOUS at 08:18

## 2017-03-20 RX ADMIN — ENOXAPARIN SODIUM 40 MG: 100 INJECTION SUBCUTANEOUS at 08:17

## 2017-03-20 RX ADMIN — LEVOTHYROXINE SODIUM 75 MCG: 75 TABLET ORAL at 06:21

## 2017-03-20 RX ADMIN — LACTULOSE 30 ML: 10 SOLUTION ORAL at 08:18

## 2017-03-20 RX ADMIN — OXYCODONE HYDROCHLORIDE 10 MG: 10 TABLET ORAL at 10:22

## 2017-03-20 RX ADMIN — INSULIN LISPRO 4 UNITS: 100 INJECTION, SOLUTION INTRAVENOUS; SUBCUTANEOUS at 17:50

## 2017-03-20 RX ADMIN — SENNOSIDES AND DOCUSATE SODIUM 2 TABLET: 8.6; 5 TABLET ORAL at 08:18

## 2017-03-20 ASSESSMENT — PAIN SCALES - GENERAL
PAINLEVEL_OUTOF10: 9
PAINLEVEL_OUTOF10: 9
PAINLEVEL_OUTOF10: 10

## 2017-03-20 ASSESSMENT — LIFESTYLE VARIABLES: EVER_SMOKED: NEVER

## 2017-03-20 NOTE — CARE PLAN
Problem: Safety  Goal: Will remain free from injury  High fall risk precautions in place. Pt encouraged to use call light before getting out of bed. Pt verbalized understanding and able to return demonstrate how to use call light. Bed alarm set.

## 2017-03-20 NOTE — DISCHARGE PLANNING
CCS received notification from the following facilities.     [facility]: the referral has been denied. Patient Care Exceed Capacity  [facility]:  The referral has been denied. Patient Care Exceed Capacity
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
## 2017-03-20 NOTE — PROGRESS NOTES
2100: Pt assessed and medicated per MD order. Pt fatigued and states she doesn't feel good but is unable to describe what doesn't feel good. VSS. Pain at baseline. FSBG elevated. Fall precautions in place with call light in reach.   2300: Pt sleeping with no s/s of distress.   0130: Pt sleeping with no s/s of distress.   0155: Pt medicated for pain per MAR.   0330: Pt assisted to the BSC x2 assist. Large soft BM. Back to bed without incident.   0625: Pt medicated per MD order. Assisted to cardiac chair. No further needs at this time.

## 2017-03-20 NOTE — PROGRESS NOTES
Tele Summary @ 0529    Rhythm : Sinus Rhythm  Ectopy : none  HR : 95  IA : 0.14  QRS : 0.08  QT : 0.38    Any further monitoring will be done by patient's Primary Nurse

## 2017-03-20 NOTE — CARE PLAN
Problem: Infection  Goal: Will remain free from infection  Standard precautions in place with every patient interaction. Frequent handwashing and foaming utilized to prevent spread of infection to patient or staff members.

## 2017-03-20 NOTE — DISCHARGE SUMMARY
DATE OF ADMISSION:  03/16/2017    DATE OF DISCHARGE:  03/19/2017    PRIMARY CARE PROVIDER:  Dr. Torres Brody.    NEUROLOGIST:  Dr. Fox.    CHIEF COMPLAINT ON ADMISSION:  Cough with shortness of breath and altered   mentation.    DISCHARGE DIAGNOSES:  1.  Septic shock with lactic acidosis, resolved.  2.  Recent urinary tract infection, likely source, improving.  3.  Bilateral lower extremity paralysis, questionable conversion disorder.  4.  The patient has had extensive workup in the past and is followed by   neurologist, Dr. Fox.  5.  Hypertension, controlled.  6.  Chronic pain syndrome.  7.  Suprapubic catheter use.  8.  History of schizophrenia.  9.  Obstructive sleep apnea, on home CPAP.  10.  Hypothyroidism.  11.  Anxiety disorder.  12.  Obesity.  13.  Neurogenic bladder.  14.  Multiple personality disorder.  15.  Gastroesophageal reflux disease.  16.  Peripheral neuropathy.  17.  History of prior ablation treatment.  18.  Chronic constipation, requiring lactulose use.    DISCHARGE MEDICATIONS:  1.  Duragesic patch 25 mcg every 72 hours.  2.  Roxicodone 5 mg q. 4 hours p.r.n. pain.  3.  Aspirin 81 mg daily.  4.  Sodium bicarbonate 325 two tabs p.o. 3 times a day.  5.  Albuterol 2 puffs every 6 hours p.r.n. shortness of breath.  6.  Neurontin 600-1200 mg p.o. b.i.d.  7.  Prozac 10 mg daily.  8.  Phenergan q. 6 hours p.r.n. nausea and vomiting.  9.  Lipitor 40 mg p.o. at bedtime.  10.  Geodon 160 mg every evening.  11.  Corlentor 5 mg p.o. b.i.d.  12.  Nystatin powder 1 application to affected area 3 times a day x10 days.  13.  Ciprofloxacin 500 mg p.o. q. 12 hours for an additional 7-day course.  14.  Vitamin B12 1000 mcg p.o. b.i.d.  15.  Lactulose 10 g p.o. b.i.d.  16.  Cranberry 1 cap b.i.d.  17.  Melatonin 5 mg p.o. at bedtime.  18.  Flexeril 10 mg p.o. b.i.d. p.r.n.  19.  Synthroid 75 mcg daily.  20.  Prilosec 20 mg p.o. b.i.d.  21.  Vitamin D 50,000 units every Friday.    HOSPITAL COURSE:  The  patient is a 26-year-old female with multiple recurrent   admissions for similar presentation with multiple comorbidities including   known history of neurogenic bladder requiring suprapubic catheter placement   and use, complicated by history of multiple personality disorder, conversion   disorder, schizophrenia.  The patient's chief complaint was shortness of   breath with cough and confusion.  The patient was recently treated for urinary   tract infection.  The patient did not have significant leukocytosis on   initial evaluation; however, given the patient's lethargy, there was a high   concern for septic shock secondary to urinary source.  With recent discharge,   the patient was started on vancomycin as well as cefepime.  The patient was   monitored in the intensive care unit.  The patient's Lasix diuresis was held.    The patient was noted to have elevated lactic acid peaked at 4.2 and has   trended down.  Urine cultures on the 9th reveals mixed skin letty with Proteus   mirabilis sensitive to ciprofloxacin as well as cefepime.  The patient's   symptoms have significantly improved.  The patient now appears to be at her   baseline mentation during this admission.  The patient reports bilateral lower   extremity weakness, which occurs intermittently.  The patient has been   extensively worked up and followed by neurologist, Dr. Fox, including   prior EMG, muscle biopsy, MRI of the entire central nervous system for   concerns regarding mitochondrial disease.  The patient has been treated with   prior high dose IV steroids with improvement of her paralysis.  It is unclear   if this is related to her underlying psychiatric history with concern for   conversion disorder.  I have discussed the case with neurologist, Dr. Reno,   who is very familiar with the patient.  Due to the unclear history of   patient's presentation per neurologist, the patient does appear to improve   with administration of high dose IV  steroids and recommendation was to   continue 3 days of high dose IV steroids.  At this point, the patient has been   started on high dose IV steroids with minimal degrees of improvement of lower   extremity weakness.  The patient will benefit from further physical therapy   and transfer to a skilled nursing facility for rehab.  Otherwise, the   patient's sepsis has appeared to resolve.  We have transitioned the patient   from IV to oral antibiotics.  Blood cultures have remained negative.  The   patient has been resumed on home medications, except for her Lasix.  This is   to be readdressed with her primary care provider, to be resumed by her primary   care provider as needed.  Otherwise, the patient is stable for transfer to   skilled nursing facility.    DISPOSITION:  To skilled nursing facility.    CONDITION:  Fair.    FOLLOWUP INSTRUCTIONS:  With primary care provider in 2 days, neurologist in 2   weeks, ciprofloxacin x7 days.    Total transfer preparation time is 50 minutes.  Code status to remain full   code.       ____________________________________     RICHIE ALARCON DO    EN / NTS    DD:  03/19/2017 17:05:08  DT:  03/19/2017 17:33:14    D#:  [id number]  Job#:  [id number]    Addendum- improved LE strength, s/p 3 days IV steroids.  OK for transfer to snf

## 2017-03-20 NOTE — DISCHARGE PLANNING
Pt chose [facility] and SW faxed transport form to [facility] to arrange transport today with bariatric wheelchair.  SW preparing COBRA and will notify medical staff and pt once time is confirmed.

## 2017-03-20 NOTE — DISCHARGE INSTRUCTIONS
Discharge Instructions    Discharged to group home by medical transportation with escort. Discharged via wheelchair, hospital escort: Yes.  Special equipment needed: Not Applicable    Be sure to schedule a follow-up appointment with your primary care doctor or any specialists as instructed.     Discharge Plan:   Diet Plan: Discussed  Activity Level: Discussed  Confirmed Follow up Appointment: Appointment Scheduled  Confirmed Symptoms Management: Discussed  Medication Reconciliation Updated: Yes  Influenza Vaccine Indication: Not indicated: Previously immunized this influenza season and > 8 years of age    I understand that a diet low in cholesterol, fat, and sodium is recommended for good health. Unless I have been given specific instructions below for another diet, I accept this instruction as my diet prescription.   Other diet: Regular diet    Special Instructions: None    · Is patient discharged on Warfarin / Coumadin?   No     · Is patient Post Blood Transfusion?  No    Depression / Suicide Risk    As you are discharged from this [facility] facility, it is important to learn how to keep safe from harming yourself.    Recognize the warning signs:  · Abrupt changes in personality, positive or negative- including increase in energy   · Giving away possessions  · Change in eating patterns- significant weight changes-  positive or negative  · Change in sleeping patterns- unable to sleep or sleeping all the time   · Unwillingness or inability to communicate  · Depression  · Unusual sadness, discouragement and loneliness  · Talk of wanting to die  · Neglect of personal appearance   · Rebelliousness- reckless behavior  · Withdrawal from people/activities they love  · Confusion- inability to concentrate     If you or a loved one observes any of these behaviors or has concerns about self-harm, here's what you can do:  · Talk about it- your feelings and reasons for harming yourself  · Remove any means that you might use  to hurt yourself (examples: pills, rope, extension cords, firearm)  · Get professional help from the community (Mental Health, Substance Abuse, psychological counseling)  · Do not be alone:Call your Safe Contact- someone whom you trust who will be there for you.  · Call your local CRISIS HOTLINE 601-5518 or 622-389-4139  · Call your local Children's Mobile Crisis Response Team [place] (166) 570-3308 or www.[facility]  · Call the toll free National Suicide Prevention Hotlines   · National Suicide Prevention Lifeline 253-567-GEMP (6098)  · [facility] Hope Line Network 800-SUICIDE (062-1825)    Septic Shock  Septic shock is the final, most serious stage of the body's inflammatory response to an infection (sepsis). Sepsis happens when the chemicals that are produced by your body to fight infection cause inflammation through your entire body (systemic). This can lead to problems with your blood pressure that prevent your organs from getting the oxygen they need. Septic shock results when your organs begin to fail from the drop in blood pressure.  Infections that lead to sepsis often begin in the lungs, abdomen, urinary tract, reproductive system, or digestive system.  CAUSES  Septic shock can result from any bacterial, fungal, or viral infection in the body. Septic shock from a viral infection is rare.  RISK FACTORS  You may be more likely to develop septic shock from an infection if you:  · Are very young or very old.  · Have AIDS or another disease that weakens your body's defense system (immune system).  · Have diabetes.  · Have lymphoma or leukemia.  · Have a disease of the lungs, intestines, reproductive system, or urinary tract.  · Have been hospitalized for a long time, especially if you are using a catheter.  · Had a recent surgery or medical procedure.  · Have been taking antibiotic medicines or steroid medicines for a long time.  SIGNS AND SYMPTOMS  Signs and symptoms of septic shock may  include:  2. Low blood pressure.  3. A rapid heart rate or heart palpitations.  4. Shortness of breath.  5. Rash or discolored skin.  6. A very high or very low body temperature with chills.  7. Reduced urine output.  8. Feeling lightheaded, weak, or confused.  9. Agitation or restlessness.  DIAGNOSIS  Your health care provider can diagnose septic shock based on your symptoms and your medical history. Your health care provider will also perform a physical exam. Tests that will be done to confirm the diagnosis may include:  2. Tests to check for infection by trying to grow (culture) bacteria from samples of:  1. Blood.  2. Urine.  3. Brain and spinal fluid (cerebrospinal fluid or CSF).  4. Mucus.  5. Wound secretions.  3. Imaging tests, such as:  1. X-rays.  2. Ultrasound.  3. CT scans.  4. MRI.  TREATMENT  Septic shock is a medical emergency. Treatment takes place in a hospital, usually in the intensive care unit (ICU). You may be given antibiotics through an IV tube immediately. Other possible treatments include:  2. Medicine to increase blood pressure (vasopressor medicines).  3. Steroids to reduce inflammation.  4. IV fluids to help with symptoms of dehydration.  5. Respirators or breathing machines to support breathing.  6. Insulin to control blood sugar.  7. Surgery to clean wounds or remove infected or dead tissue. This is needed in some cases.  8. Dialysis.  SEEK IMMEDIATE MEDICAL CARE IF:  Septic shock is a serious problem that is a medical emergency. Do not wait to see if the symptoms will go away. Get medical help right away. Call your local emergency services (911 in the [place] Do not drive yourself to the hospital.     This information is not intended to replace advice given to you by your health care provider. Make sure you discuss any questions you have with your health care provider.     Document Released: 08/21/2015 Document Reviewed: 08/21/2015  Elsevier Interactive Patient Education ©2016 Elsevier  Inc.

## 2017-03-20 NOTE — DISCHARGE PLANNING
Received call from Larisa Orlando at [facility], their transport is running very late, so they will not pick-up patient today until [phone].  Message left for [facility].

## 2017-03-20 NOTE — DISCHARGE PLANNING
Received call from [facility] Bianca requesting transport for patient.  Spoke with Larisa Orlando at [facility] they will accept patient in transfer.  Patient to transfer to [facility] today at 1700 via the [facility] van.  Discharge  Summary faxed to [facility] at [phone].  [facility] Bianca advised of transport time.

## 2017-03-20 NOTE — PROGRESS NOTES
Tele summary  Rhythm: SR  Rate: 70's-80's   IL: 0.12  QRS: 0.10  QT: 0.38    Ectopy: none    Telemetry strips placed in pt chart.

## 2017-03-20 NOTE — FLOWSHEET NOTE
03/20/17 1445   Events/Summary/Plan   Events/Summary/Plan Called for TX.   Interdisciplinary Plan of Care-Goals (Indications)   Obstructive Ventilatory Defect or Pulmonary Disease without Obvious Obstruction History / Diagnosis   Interdisciplinary Plan of Care-Outcomes    Bronchodilator Outcome Patient at Stable Baseline   RT Assessment of Delivered Medications   Evaluation of Medication Delivery Daily Yes-- Pt /Family has been Instructed in use of Respiratory Medications and Adverse Reactions   SVN Group   #SVN Performed Yes   Given By: Mouthpiece   Chest Exam   Respiration 16   Heart Rate (Monitored) 72   Breath Sounds   Pre/Post Intervention Pre Intervention Assessment   RUL Breath Sounds Clear   RML Breath Sounds Clear;Diminished   RLL Breath Sounds Diminished   MARY Breath Sounds Clear   LLL Breath Sounds Diminished   Oxygen   O2 (LPM) 2.5   O2 Daily Delivery Respiratory  Nasal Cannula

## 2017-03-20 NOTE — DISCHARGE PLANNING
CCS called the following facilities to follow up on the SNF referral     [facility]: admin is reviewing the referral   [facility]: referral is been reviewed   [facility]: referral is been reviewed.   [facility]: referral is under review  [facility]: Liaison will conduct a onsite evaluation     Sw on floor has been notified.

## 2017-03-20 NOTE — DISCHARGE PLANNING
CCS received a call from Larisa Orlando at [facility]. The referral has been accepted.

## 2017-03-20 NOTE — PROGRESS NOTES
Report received from Mona HASSAN. Pt resting in bed with no needs at this time. Fall precautions in place with call light in reach. POC discussed. All questions and concerns addressed.

## 2017-03-21 LAB
BACTERIA BLD CULT: NORMAL
SIGNIFICANT IND 70042: NORMAL
SITE SITE: NORMAL
SOURCE SOURCE: NORMAL

## 2017-03-21 PROCEDURE — 665998 HH PPS REVENUE CREDIT

## 2017-03-21 PROCEDURE — 665999 HH PPS REVENUE DEBIT

## 2017-03-22 ENCOUNTER — TELEPHONE (OUTPATIENT)
Dept: NEUROLOGY | Facility: MEDICAL CENTER | Age: 27
End: 2017-03-22

## 2017-03-22 PROCEDURE — 665999 HH PPS REVENUE DEBIT

## 2017-03-22 PROCEDURE — 665998 HH PPS REVENUE CREDIT

## 2017-03-22 NOTE — TELEPHONE ENCOUNTER
Pt's grandmother is calling and she had questions regarding her granddaughter's next infusion can be scheduled. Informed pt's grandmother to gay and set up her next appointment at the Infusion center. Pt's grandmother will call and make her next appointment. HU

## 2017-03-23 ENCOUNTER — APPOINTMENT (OUTPATIENT)
Dept: MEDICAL GROUP | Facility: MEDICAL CENTER | Age: 27
End: 2017-03-23
Payer: MEDICARE

## 2017-03-23 PROCEDURE — 665998 HH PPS REVENUE CREDIT

## 2017-03-23 PROCEDURE — 665999 HH PPS REVENUE DEBIT

## 2017-03-24 ENCOUNTER — APPOINTMENT (OUTPATIENT)
Dept: ONCOLOGY | Facility: MEDICAL CENTER | Age: 27
End: 2017-03-24
Attending: PSYCHIATRY & NEUROLOGY
Payer: MEDICARE

## 2017-03-24 PROCEDURE — 665998 HH PPS REVENUE CREDIT

## 2017-03-24 PROCEDURE — 665999 HH PPS REVENUE DEBIT

## 2017-03-24 PROCEDURE — G0180 MD CERTIFICATION HHA PATIENT: HCPCS | Performed by: INTERNAL MEDICINE

## 2017-03-25 PROCEDURE — 665998 HH PPS REVENUE CREDIT

## 2017-03-25 PROCEDURE — 665999 HH PPS REVENUE DEBIT

## 2017-03-26 PROCEDURE — 665998 HH PPS REVENUE CREDIT

## 2017-03-26 PROCEDURE — 665999 HH PPS REVENUE DEBIT

## 2017-03-27 ENCOUNTER — APPOINTMENT (OUTPATIENT)
Dept: RADIOLOGY | Facility: MEDICAL CENTER | Age: 27
End: 2017-03-27
Attending: EMERGENCY MEDICINE
Payer: MEDICARE

## 2017-03-27 ENCOUNTER — HOSPITAL ENCOUNTER (EMERGENCY)
Facility: MEDICAL CENTER | Age: 27
End: 2017-03-27
Attending: EMERGENCY MEDICINE
Payer: MEDICARE

## 2017-03-27 ENCOUNTER — TELEPHONE (OUTPATIENT)
Dept: CARDIOLOGY | Facility: MEDICAL CENTER | Age: 27
End: 2017-03-27

## 2017-03-27 VITALS
WEIGHT: 255 LBS | RESPIRATION RATE: 9 BRPM | HEART RATE: 85 BPM | OXYGEN SATURATION: 96 % | HEIGHT: 63 IN | BODY MASS INDEX: 45.18 KG/M2

## 2017-03-27 DIAGNOSIS — R07.9 CHEST PAIN, UNSPECIFIED TYPE: ICD-10-CM

## 2017-03-27 LAB
ALBUMIN SERPL BCP-MCNC: 4.4 G/DL (ref 3.2–4.9)
ALBUMIN/GLOB SERPL: 1.6 G/DL
ALP SERPL-CCNC: 69 U/L (ref 30–99)
ALT SERPL-CCNC: 127 U/L (ref 2–50)
ANION GAP SERPL CALC-SCNC: 12 MMOL/L (ref 0–11.9)
APTT PPP: 26.9 SEC (ref 24.7–36)
AST SERPL-CCNC: 65 U/L (ref 12–45)
BASOPHILS # BLD AUTO: 0.3 % (ref 0–1.8)
BASOPHILS # BLD: 0.02 K/UL (ref 0–0.12)
BILIRUB SERPL-MCNC: 0.7 MG/DL (ref 0.1–1.5)
BNP SERPL-MCNC: 5 PG/ML (ref 0–100)
BUN SERPL-MCNC: 9 MG/DL (ref 8–22)
CALCIUM SERPL-MCNC: 9.7 MG/DL (ref 8.5–10.5)
CHLORIDE SERPL-SCNC: 101 MMOL/L (ref 96–112)
CO2 SERPL-SCNC: 21 MMOL/L (ref 20–33)
CREAT SERPL-MCNC: 0.72 MG/DL (ref 0.5–1.4)
DEPRECATED D DIMER PPP IA-ACNC: <200 NG/ML(D-DU)
EKG IMPRESSION: NORMAL
EOSINOPHIL # BLD AUTO: 0.17 K/UL (ref 0–0.51)
EOSINOPHIL NFR BLD: 2.5 % (ref 0–6.9)
ERYTHROCYTE [DISTWIDTH] IN BLOOD BY AUTOMATED COUNT: 46.6 FL (ref 35.9–50)
GFR SERPL CREATININE-BSD FRML MDRD: >60 ML/MIN/1.73 M 2
GLOBULIN SER CALC-MCNC: 2.8 G/DL (ref 1.9–3.5)
GLUCOSE SERPL-MCNC: 171 MG/DL (ref 65–99)
HCT VFR BLD AUTO: 37.7 % (ref 37–47)
HGB BLD-MCNC: 12.5 G/DL (ref 12–16)
IMM GRANULOCYTES # BLD AUTO: 0.08 K/UL (ref 0–0.11)
IMM GRANULOCYTES NFR BLD AUTO: 1.2 % (ref 0–0.9)
INR PPP: 0.98 (ref 0.87–1.13)
LIPASE SERPL-CCNC: 44 U/L (ref 11–82)
LYMPHOCYTES # BLD AUTO: 2.79 K/UL (ref 1–4.8)
LYMPHOCYTES NFR BLD: 40.6 % (ref 22–41)
MCH RBC QN AUTO: 30.3 PG (ref 27–33)
MCHC RBC AUTO-ENTMCNC: 33.2 G/DL (ref 33.6–35)
MCV RBC AUTO: 91.5 FL (ref 81.4–97.8)
MONOCYTES # BLD AUTO: 0.69 K/UL (ref 0–0.85)
MONOCYTES NFR BLD AUTO: 10 % (ref 0–13.4)
NEUTROPHILS # BLD AUTO: 3.12 K/UL (ref 2–7.15)
NEUTROPHILS NFR BLD: 45.4 % (ref 44–72)
NRBC # BLD AUTO: 0 K/UL
NRBC BLD AUTO-RTO: 0 /100 WBC
PLATELET # BLD AUTO: 238 K/UL (ref 164–446)
PMV BLD AUTO: 11.3 FL (ref 9–12.9)
POTASSIUM SERPL-SCNC: 3.8 MMOL/L (ref 3.6–5.5)
PROT SERPL-MCNC: 7.2 G/DL (ref 6–8.2)
PROTHROMBIN TIME: 13.3 SEC (ref 12–14.6)
RBC # BLD AUTO: 4.12 M/UL (ref 4.2–5.4)
SODIUM SERPL-SCNC: 134 MMOL/L (ref 135–145)
TROPONIN I SERPL-MCNC: <0.01 NG/ML (ref 0–0.04)
WBC # BLD AUTO: 6.9 K/UL (ref 4.8–10.8)

## 2017-03-27 PROCEDURE — 84484 ASSAY OF TROPONIN QUANT: CPT

## 2017-03-27 PROCEDURE — 85610 PROTHROMBIN TIME: CPT

## 2017-03-27 PROCEDURE — 665999 HH PPS REVENUE DEBIT

## 2017-03-27 PROCEDURE — 99284 EMERGENCY DEPT VISIT MOD MDM: CPT

## 2017-03-27 PROCEDURE — A9270 NON-COVERED ITEM OR SERVICE: HCPCS | Performed by: EMERGENCY MEDICINE

## 2017-03-27 PROCEDURE — 700102 HCHG RX REV CODE 250 W/ 637 OVERRIDE(OP): Performed by: EMERGENCY MEDICINE

## 2017-03-27 PROCEDURE — 85025 COMPLETE CBC W/AUTO DIFF WBC: CPT

## 2017-03-27 PROCEDURE — 80053 COMPREHEN METABOLIC PANEL: CPT

## 2017-03-27 PROCEDURE — 71010 DX-CHEST-PORTABLE (1 VIEW): CPT

## 2017-03-27 PROCEDURE — 83880 ASSAY OF NATRIURETIC PEPTIDE: CPT

## 2017-03-27 PROCEDURE — 85379 FIBRIN DEGRADATION QUANT: CPT

## 2017-03-27 PROCEDURE — 85730 THROMBOPLASTIN TIME PARTIAL: CPT

## 2017-03-27 PROCEDURE — 93005 ELECTROCARDIOGRAM TRACING: CPT

## 2017-03-27 PROCEDURE — 665998 HH PPS REVENUE CREDIT

## 2017-03-27 PROCEDURE — 83690 ASSAY OF LIPASE: CPT

## 2017-03-27 RX ORDER — NITROGLYCERIN 0.4 MG/1
0.4 TABLET SUBLINGUAL ONCE
Status: COMPLETED | OUTPATIENT
Start: 2017-03-27 | End: 2017-03-27

## 2017-03-27 RX ORDER — NITROGLYCERIN 0.4 MG/1
0.4 TABLET SUBLINGUAL PRN
Qty: 25 TAB | Refills: 1 | Status: SHIPPED | OUTPATIENT
Start: 2017-03-27 | End: 2017-05-25

## 2017-03-27 RX ADMIN — NITROGLYCERIN 0.4 MG: 0.4 TABLET SUBLINGUAL at 16:30

## 2017-03-27 ASSESSMENT — ENCOUNTER SYMPTOMS
VOMITING: 0
ABDOMINAL PAIN: 0
SHORTNESS OF BREATH: 1
NAUSEA: 1
DIZZINESS: 1
DIAPHORESIS: 1

## 2017-03-27 ASSESSMENT — LIFESTYLE VARIABLES: DO YOU DRINK ALCOHOL: NO

## 2017-03-27 ASSESSMENT — PAIN SCALES - GENERAL: PAINLEVEL_OUTOF10: 3

## 2017-03-27 NOTE — ED PROVIDER NOTES
ED Provider Note    Scribed for Mayank lFoyd M.D. by aKran Ruiz. 3/27/2017, 3:42 PM.    Primary care provider: Torres Brody M.D.  Means of arrival: Ambulance  History obtained from: Patient  History limited by: None    CHIEF COMPLAINT  Chief Complaint   Patient presents with   • Chest Pain     left sided chest pain x1 hr. Radiates to shoulder and neck.    • Nausea     without vomiting   • Fatigue     HPI  Kristin Balderrama is a 26 y.o. female who presents to the Emergency Department after being brought in by ambulance for left-sided chest pains onset today at Counts include 234 beds at the [facility] 1:30PM. The patient reports waking up from her nap this afternoon with sudden onset of left-sided chest pains radiating into the left shoulder and neck. She has associated dizziness, shortness of breath, diaphoresis, nausea, and fatigue and was not given any medications prior to arrival. The patient was seen previously at [facility] for chest pains and palpitations and was given Nitroglycerin with relief. She states she has an appointment with Dr. Kumar (Cardiology) on 3/30 to receive nitroglycerin pills, but was advised to come to the ED if she experienced worsened chest pains again. She denies any vomiting or abdominal pain. The patient has a complicated past medical and surgical history noted below.     REVIEW OF SYSTEMS  Review of Systems   Constitutional: Positive for malaise/fatigue and diaphoresis.   Respiratory: Positive for shortness of breath.    Cardiovascular: Positive for chest pain.   Gastrointestinal: Positive for nausea. Negative for vomiting and abdominal pain.   Neurological: Positive for dizziness.   All other systems reviewed and are negative.  C.    PAST MEDICAL HISTORY   has a past medical history of Scoliosis; Multiple personality disorder; Chronic UTI (9/18/2010); Heart burn; Pain (08-15-12); History of falling; Sinus tachycardia (10/31/2013); Urinary incontinence; Hypertension; Disorder of thyroid; Obesity;  Pneumonia (2012); ASTHMA; Breath shortness; Anginal syndrome; Psychosis; Arthritis; PCOS (polycystic ovarian syndrome); Gynecological disorder; Renal disorder; Arrhythmia; Urinary bladder disorder; Tuberculosis; Sleep apnea; 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CMS-HCC); and Fall.    SURGICAL HISTORY   has past surgical history that includes neuro dest facet l/s w/ig sngl (4/21/2015); recovery (1/27/2016); delmar by laparoscopy (8/29/2010); lumbar fusion anterior (8/21/2012); other cardiac surgery (1/2016); tonsillectomy; bowel stimulator insertion (7/13/2016); gastroscopy with balloon dilatation (N/A, 1/4/2017); muscle biopsy (Right, 1/26/2017); and other abdominal surgery.    SOCIAL HISTORY  Social History   Substance Use Topics   • Smoking status: Passive Smoke Exposure - Never Smoker     Types: Cigarettes   • Smokeless tobacco: Never Used   • Alcohol Use: No      History   Drug Use No       FAMILY HISTORY  Family History   Problem Relation Age of Onset   • Hypertension Mother    • Sleep Apnea Mother    • Heart Disease Mother    • Other Mother      hypothryod   • Hypertension Maternal Uncle    • Heart Disease Maternal Grandmother    • Hypertension Maternal Grandmother    • Other Sister      Narcolepsy;fibromyalsia;bone;nerve   • Genitourinary () Sister      endometriosis       CURRENT MEDICATIONS  No current facility-administered medications for this encounter.    Current outpatient prescriptions:   •  oxycodone immediate-release (ROXICODONE) 5 MG Tab, Take 1 Tab by mouth every four hours as needed., Disp: 30 Tab, Rfl: 0  •  ciprofloxacin (CIPRO) 500 MG Tab, Take 1 Tab by mouth every 12 hours., Disp: 14 Tab, Rfl: 0  •  nystatin (MYCOSTATIN) Powder, Apply 1 Application to affected area(s) 3 times a day., Disp: 1 Bottle, Rfl: 2  •  Cranberry 1000 MG Cap, Take 1 Cap by mouth 2 times a day, with meals., Disp: 60 Cap, Rfl: 3  •  gabapentin (NEURONTIN) 600 MG tablet, Take 600-1,200 mg by mouth 2 times a day. 600 mg AM  1200 mg PM,  Disp: , Rfl:   •  Melatonin 5 MG Tab, Take 10 mg by mouth every bedtime., Disp: , Rfl:   •  ivabradine (CORLANOR) 5 MG Tab tablet, Take 5 mg by mouth 2 times a day, with meals., Disp: , Rfl:   •  atorvastatin (LIPITOR) 40 MG Tab, Take 1 Tab by mouth every bedtime., Disp: 30 Tab, Rfl: 0  •  aspirin EC (ECOTRIN) 81 MG Tablet Delayed Response, Take 1 Tab by mouth every day., Disp: 30 Tab, Rfl: 0  •  ziprasidone (GEODON) 80 MG Cap, Take 160 mg by mouth every evening. DO NOT GIVE PAST 1700, Disp: , Rfl:   •  fentanyl (DURAGESIC) 25 MCG/HR PATCH 72 HR, Apply 1 Patch to skin as directed every 72 hours., Disp: , Rfl:   •  lactulose 10 GM/15ML Solution, Take 10 g by mouth 2 times a day., Disp: , Rfl:   •  vitamin D, Ergocalciferol, (DRISDOL) 94197 UNITS Cap capsule, Take 50,000 Units by mouth every Friday., Disp: , Rfl:   •  cyclobenzaprine (FLEXERIL) 10 MG Tab, Take 10 mg by mouth 2 Times a Day. Indications: Muscle Spasm, Disp: , Rfl:   •  fluoxetine (PROZAC) 10 MG Cap, Take 1 Cap by mouth every day., Disp: 30 Cap, Rfl: 1  •  promethazine (PHENERGAN) 25 MG Tab, Take 1 Tab by mouth every 6 hours as needed for Nausea/Vomiting., Disp: 30 Tab, Rfl: 0  •  albuterol 108 (90 BASE) MCG/ACT Aero Soln inhalation aerosol, Inhale 2 Puffs by mouth every 6 hours as needed for Shortness of Breath., Disp: , Rfl:   •  cyanocobalamin (HM VITAMIN B12) 500 MCG tablet, Take 1,000 mcg by mouth 2 times a day., Disp: , Rfl:   •  sodium bicarbonate 325 MG Tab, Take 2 Tabs by mouth 3 times a day., Disp: , Rfl:   •  levothyroxine (SYNTHROID) 75 MCG Tab, Take 75 mcg by mouth Every morning on an empty stomach., Disp: , Rfl:   •  omeprazole (PRILOSEC) 20 MG delayed-release capsule, Take 20 mg by mouth 2 times a day., Disp: , Rfl:     ALLERGIES  Allergies   Allergen Reactions   • Cefdinir Shortness of Breath and Itching     Tolerated 1/18/17   • Depakote [Divalproex Sodium] Unspecified     Muscle spasms/muscle pain and weakness     • Abilify Unspecified  "    Headaches/muscle twitching     • Amitriptyline Unspecified     Headaches     • Amoxicillin Rash     Pt states \"I get a rash\".     • Ciprofloxacin Rash     Pt states \"I get a rash\".     • Clindamycin Nausea     Even with food     • Doxycycline Vomiting and Nausea     RXN=unknown   • Ees [Erythromycin] Vomiting and Nausea   • Flagyl [Metronidazole Hcl] Unspecified     \"eye problems\"     • Flomax [Tamsulosin Hydrochloride] Swelling   • Metformin Unspecified     Increased lactic acid      • Sulfa Drugs Hives and Rash     RXN=since childhood   • Tape Rash     Tears skin off   coban with Tegaderm tape ok  RXN=ongoing   • Vancomycin Itching     Pt becomes flushed in face and chest.   RXN=7/10/16   • Cephalexin [Keflex] Rash     Pt states she gets a rash with this medication   • Levofloxacin Unspecified     Leg muscle cramps       PHYSICAL EXAM  VITAL SIGNS: Pulse 85  Resp 29  Ht 1.6 m (5' 2.99\")  Wt 115.667 kg (255 lb)  BMI 45.18 kg/m2  SpO2 97%    Constitutional: No acute distress  HENT: Slight dry mucous membranes  Eyes:  No conjunctivitis or icterus  Neck:  trachea is midline, no palpable thyroid  Lymphatic:  No cervical lymphadenopathy  Cardiovascular:  Regular rate and rhythm, no murmurs  Thorax & Lungs:  Normal breath sounds, no rhonchi  Abdomen: Obese, Soft, Non-tender  Skin:.  no rash  Back: Right-sided CVA tenderness   Extremities:   no edema  Vascular:  symmetric radial pulse  Neurologic:  Normal gross motor    LABS  Labs Reviewed   CBC WITH DIFFERENTIAL - Abnormal; Notable for the following:     RBC 4.12 (*)     MCHC 33.2 (*)     Immature Granulocytes 1.20 (*)     All other components within normal limits    Narrative:     Indicate which anticoagulants the patient is on:->UNKNOWN   COMP METABOLIC PANEL - Abnormal; Notable for the following:     Sodium 134 (*)     Anion Gap 12.0 (*)     Glucose 171 (*)     AST(SGOT) 65 (*)     ALT(SGPT) 127 (*)     All other components within normal limits    Narrative:  "    Indicate which anticoagulants the patient is on:->UNKNOWN   TROPONIN    Narrative:     Indicate which anticoagulants the patient is on:->UNKNOWN   BTYPE NATRIURETIC PEPTIDE    Narrative:     Indicate which anticoagulants the patient is on:->UNKNOWN   PROTHROMBIN TIME    Narrative:     Indicate which anticoagulants the patient is on:->UNKNOWN   APTT    Narrative:     Indicate which anticoagulants the patient is on:->UNKNOWN   LIPASE    Narrative:     Indicate which anticoagulants the patient is on:->UNKNOWN   D-DIMER   ESTIMATED GFR    Narrative:     Indicate which anticoagulants the patient is on:->UNKNOWN   All labs reviewed by me.    EKG Interpretation  Interpreted by me  Normal Sinus Rhythm. Rate 69   Normal corrected QTc  Normal QRS  Normal Axis  LVH  Non-specific T wave abnormalities   Unchanged from previous EKG.     RADIOLOGY  DX-CHEST-PORTABLE (1 VIEW)   Final Result      No evidence of acute cardiopulmonary process.      The radiologist's interpretation of all radiological studies have been reviewed by me.    COURSE & MEDICAL DECISION MAKING  Pertinent Labs & Imaging studies reviewed. (See chart for details)    Reviewed the patient's past medical records, which shows she was seen in the ED with chest pain and had workup done on 3/1/2017 with EF 60%.     3:24 PM Ordered troponin, BNP, CBC, CMP, prothrombin time, APTT, lipase, and EKG    3:42 PM - Patient seen and examined at bedside. Ordered DX-chest and D-dimer to evaluate her symptoms. Atypical chest pains with low risk PE rule out deltoid troponin. Patient will be medicated with Nitroglycerin 0.4mg PO.    4:35 PM I discussed the patient's case and the above findings with Dr. Walsh (Cardiology) who agreed to consult on the patient.     Medical Decision Making:  Patient presents with chest pain Better with nitroglycerin. She has a complicated medical history she's recently seen cardiology is to follow up on Thursday. She gets episodes of chest pain get  better with nitro she had one today she's requesting [facility] show cardiology wanted her evaluated 1st. Her EKG shows no acute abnormality throughout her troponin is nondetectable. Her d-dimer is nondetectable. She has low risk for atherosclerotic coronary disease. I do not think she needs admission. She is relatively asymptomatic at this time. Mom placed on nitroglycerin she will follow-up with cardiology on Thursday of this week. Given return precautions and follow    The patient will return for new or worsening symptoms and is stable at the time of discharge.    The patient is referred to a primary physician for blood pressure management, diabetic screening, and for all other preventative health concerns.    DISPOSITION:  Patient will be discharged home in stable condition.    FOLLOW UP:  Trores Kumar M.D.  [address]  [address]  [facility] [id number] [phone]            OUTPATIENT MEDICATIONS:  New Prescriptions    No medications on file     FINAL IMPRESSION  No diagnosis found.     Karan RODRIGUEZ (Scribe), am scribing for, and in the presence of, Mayank Floyd M.D..    Electronically signed by: Karan Ruiz (Carolibe), 3/27/2017    Mayank RODRIGUEZ M.D. personally performed the services described in this documentation, as scribed by Karan Ruiz in my presence, and it is both accurate and complete.    The note accurately reflects work and decisions made by me.  Mayank Floyd  3/27/2017  6:36 PM

## 2017-03-27 NOTE — TELEPHONE ENCOUNTER
----- Message from Talia Barrera sent at 3/27/2017 10:02 AM PDT -----  Regarding: pt requesting nitro following episode  Contact: [phone]  FUENTES/jimmy    Pt calling to report heart episode (chest pain, heart palpitation) a few days ago at [facility] nursing, pt was given 2 nitro glycerin which resolved that issue.  Pt is requesting a script PRN for nitro, pt uses [facility] [address] [facility]. If questions, pt at .

## 2017-03-27 NOTE — ED AVS SNAPSHOT
3/27/2017          Kristin Balderrama  [address] [place] [address]    Dear Kristin:    [facility] wants to ensure your discharge home is safe and you or your loved ones have had all your questions answered regarding your care after you leave the hospital.    You may receive a telephone call within two days of your discharge.  This call is to make certain you understand your discharge instructions as well as ensure we provided you with the best care possible during your stay with us.     The call will only last approximately 3-5 minutes and will be done by a nurse.    Once again, we want to ensure your discharge home is safe and that you have a clear understanding of any next steps in your care.  If you have any questions or concerns, please do not hesitate to contact us, we are here for you.  Thank you for choosing [facility] for your healthcare needs.    Sincerely,    Lorenzo Zuleta    [facility]

## 2017-03-27 NOTE — TELEPHONE ENCOUNTER
LM for pt to call back.    1123 - S/w pt, states about 2 days ago she had an episode of chest pain while she was in the skilled nursing facility, she felt her heart was being squeezed and twisted and her HR was bouncing around and her bp was 170/109.  They called the on-call m.d. for the facility and they were instructed to give her Nitro.  Pt states after 2 nitro the pain resolved and her bp came down to 134/90 and HR stabilized.  Pt states she had another episode yesterday while she was at home, but it wasn't as bad.  Pt reports she does not have Nitro, so it took about 2 hours of just sitting and resting for it to resolve.  Recommended she see Eros HEAD, pt agrees.  Pt scheduled to see SONIDO TAYLOR 3/30/17 at 3:40pm.  Pt instructed to call 911 if symptoms recur prior to appt.  Pt v/u.

## 2017-03-27 NOTE — ED NOTES
Chief Complaint   Patient presents with   • Chest Pain     left sided chest pain x1 hr. Radiates to shoulder and neck.    • Nausea     without vomiting   • Fatigue     BIB [facility] from above. VSS. Chart up for ERP.

## 2017-03-27 NOTE — ED AVS SNAPSHOT
Dwllr Access Code: Activation code not generated  Current Dwllr Status: Active    Park[employer]  A secure, online tool to manage your health information     [employer]’s Dwllr® is a secure, online tool that connects you to your personalized health information from the privacy of your home -- day or night - making it very easy for you to manage your healthcare. Once the activation process is completed, you can even access your medical information using the Dwllr rocio, which is available for free in the Apple Rocio store or Google Play store.     Dwllr provides the following levels of access (as shown below):   My Chart Features   [facility] Primary Care Doctor [facility]  Specialists [facility]  Urgent  Care Non-[facility]  Primary Care  Doctor   Email your healthcare team securely and privately 24/7 X X X X   Manage appointments: schedule your next appointment; view details of past/upcoming appointments X      Request prescription refills. X      View recent personal medical records, including lab and immunizations X X X X   View health record, including health history, allergies, medications X X X X   Read reports about your outpatient visits, procedures, consult and ER notes X X X X   See your discharge summary, which is a recap of your hospital and/or ER visit that includes your diagnosis, lab results, and care plan. X X       How to register for Dwllr:  1. Go to  [email or web address].  2. Click on the Sign Up Now box, which takes you to the New Member Sign Up page. You will need to provide the following information:  a. Enter your Dwllr Access Code exactly as it appears at the top of this page. (You will not need to use this code after you’ve completed the sign-up process. If you do not sign up before the expiration date, you must request a new code.)   b. Enter your date of birth.   c. Enter your home email address.   d. Click Submit, and follow the next screen’s instructions.  3. Create a Dwllr ID. This will  be your Cerora login ID and cannot be changed, so think of one that is secure and easy to remember.  4. Create a Cerora password. You can change your password at any time.  5. Enter your Password Reset Question and Answer. This can be used at a later time if you forget your password.   6. Enter your e-mail address. This allows you to receive e-mail notifications when new information is available in Cerora.  7. Click Sign Up. You can now view your health information.    For assistance activating your Cerora account, call [phone]

## 2017-03-27 NOTE — ED AVS SNAPSHOT
Home Care Instructions                                                                                                                Kristin Balderrama   MRN: [id number]    Department:  [facility], Emergency Dept   Date of Visit:  3/27/2017            [facility], Emergency Dept    [address]    [place] [address]    Phone:  [phone]      You were seen by     Mayank Floyd M.D.      Your Diagnosis Was     Chest pain, unspecified type     R07.9       These are the medications you received during your hospitalization from 03/27/2017 1446 to 03/27/2017 1753     Date/Time Order Dose Route Action    03/27/2017 1630 nitroglycerin (NITROSTAT) tablet 0.4 mg 0.4 mg Sublingual Given      Follow-up Information     1. Follow up with Torres Kumar M.D..    Specialty:  Cardiac Electrophysiology    Contact information    [address]  [address]  [place] [address] [phone]        Medication Information     Review all of your home medications and newly ordered medications with your primary doctor and/or pharmacist as soon as possible. Follow medication instructions as directed by your doctor and/or pharmacist.     Please keep your complete medication list with you and share with your physician. Update the information when medications are discontinued, doses are changed, or new medications (including over-the-counter products) are added; and carry medication information at all times in the event of emergency situations.               Medication List      START taking these medications        Instructions    Morning Afternoon Evening Bedtime    nitroglycerin 0.4 MG Subl   Last time this was given:  0.4 mg on 3/27/2017  4:30 PM   Commonly known as:  NITROSTAT        Place 1 Tab under tongue as needed for Chest Pain.   Dose:  0.4 mg                          ASK your doctor about these medications        Instructions    Morning Afternoon Evening Bedtime    albuterol 108 (90 BASE)  MCG/ACT Aers inhalation aerosol        Inhale 2 Puffs by mouth every 6 hours as needed for Shortness of Breath.   Dose:  2 Puff                        aspirin EC 81 MG Tbec   Commonly known as:  ECOTRIN        Take 1 Tab by mouth every day.   Dose:  81 mg                        atorvastatin 40 MG Tabs   Commonly known as:  LIPITOR        Take 1 Tab by mouth every bedtime.   Dose:  40 mg                        ciprofloxacin 500 MG Tabs   Commonly known as:  CIPRO        Take 1 Tab by mouth every 12 hours.   Dose:  500 mg                        CORLANOR 5 MG Tabs tablet   Generic drug:  ivabradine        Take 5 mg by mouth 2 times a day, with meals.   Dose:  5 mg                        Cranberry 1000 MG Caps        Take 1 Cap by mouth 2 times a day, with meals.   Dose:  1 Cap                        cyclobenzaprine 10 MG Tabs   Commonly known as:  FLEXERIL        Take 10 mg by mouth 2 Times a Day. Indications: Muscle Spasm   Dose:  10 mg                        fentanyl 25 MCG/HR Pt72   Commonly known as:  DURAGESIC        Apply 1 Patch to skin as directed every 72 hours.   Dose:  1 Patch                        fluoxetine 10 MG Caps   Commonly known as:  PROZAC        Take 1 Cap by mouth every day.   Dose:  10 mg                        gabapentin 600 MG tablet   Commonly known as:  NEURONTIN        Take 600-1,200 mg by mouth 2 times a day. 600 mg AM  1200 mg PM   Dose:  600-1200 mg                        HM VITAMIN B12 500 MCG tablet   Generic drug:  cyanocobalamin        Take 1,000 mcg by mouth 2 times a day.   Dose:  1000 mcg                        lactulose 10 GM/15ML Soln        Take 10 g by mouth 2 times a day.   Dose:  10 g                        levothyroxine 75 MCG Tabs   Commonly known as:  SYNTHROID        Take 75 mcg by mouth Every morning on an empty stomach.   Dose:  75 mcg                        Melatonin 5 MG Tabs        Doctor's comments:  Takes two tabs at Bedtime (10 mg.)   Take 10 mg by mouth every  bedtime.   Dose:  10 mg                        nystatin Powd   Commonly known as:  MYCOSTATIN        Apply 1 Application to affected area(s) 3 times a day.   Dose:  1 Application                        omeprazole 20 MG delayed-release capsule   Commonly known as:  PRILOSEC        Take 20 mg by mouth 2 times a day.   Dose:  20 mg                        oxycodone immediate-release 5 MG Tabs   Commonly known as:  ROXICODONE        Take 1 Tab by mouth every four hours as needed.   Dose:  5 mg                        promethazine 25 MG Tabs   Commonly known as:  PHENERGAN        Take 1 Tab by mouth every 6 hours as needed for Nausea/Vomiting.   Dose:  25 mg                        sodium bicarbonate 325 MG Tabs        Take 2 Tabs by mouth 3 times a day.   Dose:  650 mg                        vitamin D (Ergocalciferol) 50997 UNITS Caps capsule   Commonly known as:  DRISDOL        Take 50,000 Units by mouth every Friday.   Dose:  51896 Units                        ziprasidone 80 MG Caps   Commonly known as:  GEODON        Take 160 mg by mouth every evening. DO NOT GIVE PAST 1700   Dose:  160 mg                             Where to Get Your Medications      These medications were sent to [id number] - [place], [place] - [address] [address]     Phone:  [phone]    - nitroglycerin 0.4 MG Subl            Procedures and tests performed during your visit     APTT    Btype Natriuretic Peptide    CBC with Differential    Complete Metabolic Panel (CMP)    D-DIMER    DX-CHEST-PORTABLE (1 VIEW)    EKG (ER)    ESTIMATED GFR    Lipase    Prothrombin Time    Saline Lock    Troponin        Discharge Instructions       Chest Pain, Nonspecific  It is often hard to give a specific diagnosis for the cause of chest pain. There is always a chance that your pain could be related to something serious, like a heart attack or a blood clot in the lungs. You need to follow up with your caregiver for further  evaluation. More lab tests or other studies such as X-rays, electrocardiography, stress testing, or cardiac imaging may be needed to find the cause of your pain.  Most of the time, nonspecific chest pain improves within 2 to 3 days with rest and mild pain medicine. For the next few days, avoid physical exertion or activities that bring on pain. Do not smoke. Avoid drinking alcohol. Call your caregiver for routine follow-up as advised.   SEEK IMMEDIATE MEDICAL CARE IF:  · You develop increased chest pain or pain that radiates to the arm, neck, jaw, back, or abdomen.   · You develop shortness of breath, increased coughing, or you start coughing up blood.   · You have severe back or abdominal pain, nausea, or vomiting.   · You develop severe weakness, fainting, fever, or chills.   Document Released: 12/18/2006 Document Revised: 03/11/2013 Document Reviewed: 06/06/2008  ExitCare® Patient Information ©2013 [employer].          Patient Information     Patient Information    Following emergency treatment: all patient requiring follow-up care must return either to a private physician or a clinic if your condition worsens before you are able to obtain further medical attention, please return to the emergency room.     Billing Information    At [facility], we work to make the billing process streamlined for our patients.  Our Representatives are here to answer any questions you may have regarding your hospital bill.  If you have insurance coverage and have supplied your insurance information to us, we will submit a claim to your insurer on your behalf.  Should you have any questions regarding your bill, we can be reached online or by phone as follows:  Online: You are able pay your bills online or live chat with our representatives about any billing questions you may have. We are here to help Monday - Friday from 8:00am to 7:30pm and 9:00am - 12:00pm on Saturdays.  Please visit  [email or web address]  for more information.   Phone:  [phone] or [phone]    Please note that your emergency physician, surgeon, pathologist, radiologist, anesthesiologist, and other specialists are not employed by [facility] and will therefore bill separately for their services.  Please contact them directly for any questions concerning their bills at the numbers below:     Emergency Physician Services:  [phone]  [facility]:  [phone]  [facility]:  [phone]  [facility]:  [phone]    1. Your final bill may vary from the amount quoted upon discharge if all procedures are not complete at that time, or if your doctor has additional procedures of which we are not aware. You will receive an additional bill if you return to the Emergency Department at [facility] for suture removal regardless of the facility of which the sutures were placed.     2. Please arrange for settlement of this account at the emergency registration.    3. All self-pay accounts are due in full at the time of treatment.  If you are unable to meet this obligation then payment is expected within 4-5 days.     4. If you have had radiology studies (CT, X-ray, Ultrasound, MRI), you have received a preliminary result during your emergency department visit. Please contact the radiology department [phone] to receive a copy of your final result. Please discuss the Final result with your primary physician or with the follow up physician provided.     Crisis Hotline:  [place] Crisis Hotline:  6-286-HSFIDCJ or 1-705.600.6557  [place] Crisis Hotline:    1-847.686.3769 or 652-372-4557         ED Discharge Follow Up Questions    1. In order to provide you with very good care, we would like to follow up with a phone call in the next few days.  May we have your permission to contact you?     YES /  NO    2. What is the best phone number to call  you? (       )_____-__________    3. What is the best time to call you?      Morning  /  Afternoon  /  Evening                   Patient Signature:  ____________________________________________________________    Date:  ____________________________________________________________      Your appointments     Mar 29, 2017  9:20 AM   Established Patient with AXEL Casas   [facility] [address] ([facility])    [address]  [address]  [place] [id number] [phone]           You will be receiving a confirmation call a few days before your appointment from our automated call confirmation system.            Mar 30, 2017  3:40 PM   PREVIOUS PATIENT with VANIA Sim   [facility] (--)    [address], [address]  [place] [address] [phone]            Apr 11, 2017  2:40 PM   Follow Up Visit with Sandoval Fox M.D.   [facility] Neurology (--)    [address], [address]  [place] [address] [phone]           You will be receiving a confirmation call a few days before your appointment from our automated call confirmation system.            Apr 12, 2017  9:00 AM   Individual Therapy with Blanca Brantley L.C.S.W.   [facility]    [address]  [address]  [place] [address] [phone]            Apr 17, 2017 11:00 AM   EST MISC Infusion 2 HR with RN 1   [facility] ([facility])    [address] [facility] [address]  [place] [address] [phone]            May 01, 2017  9:30 AM   Follow Up Med Management with Ronny Burnette M.D.   [facility] [address] [facility] ([facility])    [address] [facility]  [address]  [facility] [address]   [phone]            May 03, 2017  3:40 PM   Follow Up Visit with Sandoval Fox M.D.   [facility] Neurology (--)    [address] [place] [address], [address]  [facility] [address] [phone]           You will be receiving a confirmation call a few days before your appointment  from our automated call confirmation system.            May 16, 2017  1:20 PM   New Patient with Chencho Norman M.D.   [facility] Medicine (--)    [address]  [place] [address] [phone]           Please bring enclosed paperwork completed along with your insurance card and photo ID.            May 31, 2017  9:00 AM   Individual Therapy with Blanca Brantley L.C.S.W.   [facility]    [address] [facility] [facility] [address]  [address]  [place] [address] [phone]            Jul 19, 2017  8:40 AM   FOLLOW UP with Torres Kumar M.D.   [facility] (--)    [address] [place] [address]  [place] [id number] [phone]

## 2017-03-28 ENCOUNTER — TELEPHONE (OUTPATIENT)
Dept: MEDICAL GROUP | Facility: MEDICAL CENTER | Age: 27
End: 2017-03-28

## 2017-03-28 PROCEDURE — 665999 HH PPS REVENUE DEBIT

## 2017-03-28 PROCEDURE — 665998 HH PPS REVENUE CREDIT

## 2017-03-28 NOTE — TELEPHONE ENCOUNTER
She needs to wear the oxygen at 2L. If her oxygen sats drop on 2L, then she might need to increase.  It is expected that her oxygen sats will go down if she is dependent on supplemental oxygen and takes off the oxygen.

## 2017-03-28 NOTE — TELEPHONE ENCOUNTER
1. Caller Name: Kristin Balderrama                                             Call Back Number: [phone] (home)         Patient approves a detailed voicemail message: N\A    Patient stated that today she took off her oxygen for 10 to 15 seconds and her oxygen went down to 70, and is experiencing chest pain, I suggested that she be checked at the emergency room but the patient declined and said she was at [facility] yesterday and that they gave her nitroglycerin, Patient is currently on 2 litters of oxygen, patient wanted to know if she should stay on the 2 litters of oxygen or if she should raise the the oxygen a little higher ? Please advise

## 2017-03-28 NOTE — DISCHARGE INSTRUCTIONS
Chest Pain, Nonspecific  It is often hard to give a specific diagnosis for the cause of chest pain. There is always a chance that your pain could be related to something serious, like a heart attack or a blood clot in the lungs. You need to follow up with your caregiver for further evaluation. More lab tests or other studies such as X-rays, electrocardiography, stress testing, or cardiac imaging may be needed to find the cause of your pain.  Most of the time, nonspecific chest pain improves within 2 to 3 days with rest and mild pain medicine. For the next few days, avoid physical exertion or activities that bring on pain. Do not smoke. Avoid drinking alcohol. Call your caregiver for routine follow-up as advised.   SEEK IMMEDIATE MEDICAL CARE IF:  · You develop increased chest pain or pain that radiates to the arm, neck, jaw, back, or abdomen.   · You develop shortness of breath, increased coughing, or you start coughing up blood.   · You have severe back or abdominal pain, nausea, or vomiting.   · You develop severe weakness, fainting, fever, or chills.   Document Released: 12/18/2006 Document Revised: 03/11/2013 Document Reviewed: 06/06/2008  [employer]® Patient Information ©2013 [employer].

## 2017-03-29 ENCOUNTER — OFFICE VISIT (OUTPATIENT)
Dept: MEDICAL GROUP | Facility: MEDICAL CENTER | Age: 27
End: 2017-03-29
Payer: MEDICARE

## 2017-03-29 VITALS
WEIGHT: 260 LBS | SYSTOLIC BLOOD PRESSURE: 124 MMHG | HEIGHT: 62 IN | OXYGEN SATURATION: 97 % | DIASTOLIC BLOOD PRESSURE: 72 MMHG | RESPIRATION RATE: 16 BRPM | HEART RATE: 82 BPM | BODY MASS INDEX: 47.84 KG/M2 | TEMPERATURE: 97.2 F

## 2017-03-29 DIAGNOSIS — N39.0 CHRONIC UTI: Chronic | ICD-10-CM

## 2017-03-29 DIAGNOSIS — R07.89 ATYPICAL CHEST PAIN: ICD-10-CM

## 2017-03-29 DIAGNOSIS — R53.1 PROGRESSIVE FOCAL MOTOR WEAKNESS: ICD-10-CM

## 2017-03-29 DIAGNOSIS — I10 ESSENTIAL HYPERTENSION: Chronic | ICD-10-CM

## 2017-03-29 PROBLEM — A41.9 SEPSIS (HCC): Status: RESOLVED | Noted: 2017-03-09 | Resolved: 2017-03-29

## 2017-03-29 PROBLEM — A41.9 SEPTIC SHOCK (HCC): Status: RESOLVED | Noted: 2017-03-16 | Resolved: 2017-03-29

## 2017-03-29 PROBLEM — R65.21 SEPTIC SHOCK (HCC): Status: RESOLVED | Noted: 2017-03-16 | Resolved: 2017-03-29

## 2017-03-29 PROCEDURE — G8482 FLU IMMUNIZE ORDER/ADMIN: HCPCS | Performed by: NURSE PRACTITIONER

## 2017-03-29 PROCEDURE — 665999 HH PPS REVENUE DEBIT

## 2017-03-29 PROCEDURE — 1111F DSCHRG MED/CURRENT MED MERGE: CPT | Performed by: NURSE PRACTITIONER

## 2017-03-29 PROCEDURE — 1036F TOBACCO NON-USER: CPT | Performed by: NURSE PRACTITIONER

## 2017-03-29 PROCEDURE — 665998 HH PPS REVENUE CREDIT

## 2017-03-29 PROCEDURE — 99214 OFFICE O/P EST MOD 30 MIN: CPT | Performed by: NURSE PRACTITIONER

## 2017-03-29 PROCEDURE — G8419 CALC BMI OUT NRM PARAM NOF/U: HCPCS | Performed by: NURSE PRACTITIONER

## 2017-03-29 PROCEDURE — G8432 DEP SCR NOT DOC, RNG: HCPCS | Performed by: NURSE PRACTITIONER

## 2017-03-29 ASSESSMENT — ENCOUNTER SYMPTOMS: MYALGIAS: 1

## 2017-03-29 NOTE — PROGRESS NOTES
Subjective:      Kristin Balderrama is a 26 y.o. female who presents with Hospital Follow-up            HPI Kristin Balderrama is here today accompanied by her mother for her most recent of 3 hospital admissions.      1. Atypical chest pain  Patient was admitted to the hospital from March 19 until March 11 for UTI problems and then was seen again on March 16 and discharged on March 19 for cough and possible septic shock related to urinary symptoms. She then went back into the hospital by ambulance from [facility] on March 27 for complaints of chest pain. She had had echocardiogram done about 2 weeks earlier which showed good ejection fraction and normal left ventricular systolic function. Her d-dimer and troponins were negative. EKG was normal. She was given nitroglycerin for the chest pain and she felt it helped.    Patient states since she is home she is still having problems with chest pain. When advised that her vital signs were all normal in the office today she felt it was all because of the nitroglycerin she took earlier today. She states she has pain in her chest region and it is pearly persistent without nitroglycerin. She denies associated syncope or shortness of breath. She is on oxygen continuously and there was a telephone message from yesterday where she complained of oxygen going very low although it is 97% in the office on oxygen.    2. Essential hypertension  Patient's blood pressure has remained well without medication.    3. Chronic UTI  Patient has a suprapubic catheter and is frequently seen at the emergency room for possible UTIs although she has no complaints today of this.    4. Progressive focal motor weakness  Patient has had numerous workups for this here and at [facility] with questionable mitochondrial disease versus mental health issues.    Social History   Substance Use Topics   • Smoking status: Passive Smoke Exposure - Never Smoker     Types: Cigarettes   • Smokeless tobacco: Never  "Used   • Alcohol Use: No     Family History   Problem Relation Age of Onset   • Hypertension Mother    • Sleep Apnea Mother    • Heart Disease Mother    • Other Mother      hypothryod   • Hypertension Maternal Uncle    • Heart Disease Maternal Grandmother    • Hypertension Maternal Grandmother    • Other Sister      Narcolepsy;fibromyalsia;bone;nerve   • Genitourinary () Sister      endometriosis     Past Medical History   Diagnosis Date   • Scoliosis    • Multiple personality disorder    • Chronic UTI 9/18/2010   • Heart burn    • Pain 08-15-12     back, 7/10   • History of falling    • Sinus tachycardia 10/31/2013   • Urinary incontinence    • Hypertension    • Disorder of thyroid    • Obesity    • Pneumonia 2012   • ASTHMA      when around smoke   • Breath shortness      with tachycardia   • Anginal syndrome      random chest pain especially with tachycardia   • Psychosis    • Arthritis      osteo   • PCOS (polycystic ovarian syndrome)    • Gynecological disorder      PCOS   • Renal disorder      \"kidney disease, stage 1\" nephrologist, Dr. Vallejo   • Arrhythmia      \"sinus tachycardia\", cariologist, Dr. 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   • Sleep apnea      CPAP \"pulmonary doctor took me off mid year 2016\"   • 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CMS-HCC)    • Fall      Current Outpatient Prescriptions   Medication Sig Dispense Refill   • nystatin (MYCOSTATIN) Powder Apply 1 Application to affected area(s) 3 times a day. 1 Bottle 2   • Cranberry 1000 MG Cap Take 1 Cap by mouth 2 times a day, with meals. 60 Cap 3   • gabapentin (NEURONTIN) 600 MG tablet Take 600-1,200 mg by mouth 2 times a day. 600 mg AM   1200 mg PM     • Melatonin 5 MG Tab Take 10 mg by mouth every bedtime.     • ivabradine (CORLANOR) 5 MG Tab tablet Take 5 mg by mouth 2 times a day, with meals.     • atorvastatin (LIPITOR) 40 MG Tab Take 1 Tab by mouth " every bedtime. 30 Tab 0   • aspirin EC (ECOTRIN) 81 MG Tablet Delayed Response Take 1 Tab by mouth every day. 30 Tab 0   • ziprasidone (GEODON) 80 MG Cap Take 160 mg by mouth every evening. DO NOT GIVE PAST 1700     • fentanyl (DURAGESIC) 25 MCG/HR PATCH 72 HR Apply 1 Patch to skin as directed every 72 hours.     • lactulose 10 GM/15ML Solution Take 10 g by mouth 2 times a day.     • vitamin D, Ergocalciferol, (DRISDOL) 26019 UNITS Cap capsule Take 50,000 Units by mouth every Friday.     • cyclobenzaprine (FLEXERIL) 10 MG Tab Take 10 mg by mouth 2 Times a Day. Indications: Muscle Spasm     • fluoxetine (PROZAC) 10 MG Cap Take 1 Cap by mouth every day. 30 Cap 1   • promethazine (PHENERGAN) 25 MG Tab Take 1 Tab by mouth every 6 hours as needed for Nausea/Vomiting. 30 Tab 0   • albuterol 108 (90 BASE) MCG/ACT Aero Soln inhalation aerosol Inhale 2 Puffs by mouth every 6 hours as needed for Shortness of Breath.     • cyanocobalamin (HM VITAMIN B12) 500 MCG tablet Take 1,000 mcg by mouth 2 times a day.     • sodium bicarbonate 325 MG Tab Take 2 Tabs by mouth 3 times a day.     • levothyroxine (SYNTHROID) 75 MCG Tab Take 75 mcg by mouth Every morning on an empty stomach.     • omeprazole (PRILOSEC) 20 MG delayed-release capsule Take 20 mg by mouth 2 times a day.     • nitroglycerin (NITROSTAT) 0.4 MG SL Tab Place 1 Tab under tongue as needed for Chest Pain. 25 Tab 1   • oxycodone immediate-release (ROXICODONE) 5 MG Tab Take 1 Tab by mouth every four hours as needed. 30 Tab 0   • ciprofloxacin (CIPRO) 500 MG Tab Take 1 Tab by mouth every 12 hours. 14 Tab 0     No current facility-administered medications for this visit.       Review of Systems   Constitutional: Positive for malaise/fatigue.   Cardiovascular: Positive for chest pain.   Musculoskeletal: Positive for myalgias.   All other systems reviewed and are negative.         Objective:     /72 mmHg  Pulse 82  Temp(Src) 36.2 °C (97.2 °F)  Resp 16  Ht 1.575 m (5'  "2.01\")  Wt 117.935 kg (260 lb)  BMI 47.54 kg/m2  SpO2 97%     Physical Exam   Constitutional: She is oriented to person, place, and time. She appears well-developed and well-nourished. No distress.   HENT:   Head: Normocephalic and atraumatic.   Right Ear: External ear normal.   Left Ear: External ear normal.   Nose: Nose normal.   Eyes: Right eye exhibits no discharge. Left eye exhibits no discharge.   Neck: Normal range of motion. Neck supple. No thyromegaly present.   Cardiovascular: Normal rate, regular rhythm and normal heart sounds.  Exam reveals no gallop and no friction rub.    No murmur heard.  Pulmonary/Chest: Effort normal and breath sounds normal. She has no wheezes. She has no rales.   Genitourinary:   Patient has suprapubic catheter and urine appears slightly low and bag.   Musculoskeletal: She exhibits no edema or tenderness.   Patient is using a walker for mobility but needs no other assistance.   Neurological: She is alert and oriented to person, place, and time. She displays normal reflexes.   Skin: Skin is warm and dry. No rash noted. She is not diaphoretic.   Psychiatric: She has a normal mood and affect. Her behavior is normal. Judgment and thought content normal.   Nursing note and vitals reviewed.            Echocardiography Laboratory    CONCLUSIONS  Normal left ventricular systolic function.  Left ventricular ejection fraction is visually estimated to be 60%.  Mild concentric left ventricular hypertrophy.  Unable to estimate pulmonary artery pressure due to an inadequate   tricuspid regurgitant jet.  No significant valve abnormalities.   Assessment/Plan:     1. Atypical chest pain  Patient is having continuous chest pain despite normal echocardiogram, negative troponin and negative d-dimer, 2 negative chest x-rays this month and normal EKG. She feels the only thing that is helping with her chest pain is nitroglycerin on a regular basis. I suspect there is a mental health component to her " symptoms but she may need a nuclear med stress test or angiogram in the future to rule out other causes for her chest pain. She will be seen in the cardiology APRN and I will let her decide if further workup is needed. She seems in her normal state of health today.    2. Essential hypertension  Blood pressure is currently well controlled.    3. Chronic UTI  Patient has recurrent UTIs but she is asymptomatic today.    4. Progressive focal motor weakness  Patient will continue to follow with neurology although previous muscle biopsies have been negative. She also follows at [facility]. She does not feel she needs to be in [facility] care any longer.

## 2017-03-29 NOTE — MR AVS SNAPSHOT
"        Kristin Balderrama   3/29/2017 9:20 AM   Office Visit   MRN: [id number]    Department:  [address] [place] [address]   Dept Phone:  [phone]    Description:  Female : 1990   Provider:  AXEL Casas           Reason for Visit     Hospital Follow-up minor care / sepsis      Allergies as of 3/29/2017     Allergen Noted Reactions    Cefdinir 2016   Shortness of Breath, Itching    Tolerated 17    Depakote [Divalproex Sodium] 2010   Unspecified    Muscle spasms/muscle pain and weakness      Abilify 2013   Unspecified    Headaches/muscle twitching      Amitriptyline 10/31/2013   Unspecified    Headaches      Amoxicillin 2010   Rash    Pt states \"I get a rash\".      Ciprofloxacin 2009   Rash    Pt states \"I get a rash\".      Clindamycin 2011   Nausea    Even with food      Doxycycline 08/15/2012   Vomiting, Nausea    RXN=unknown    Ees [Erythromycin] 2010   Vomiting, Nausea    Flagyl [Metronidazole Hcl] 2011   Unspecified    \"eye problems\"      Flomax [Tamsulosin Hydrochloride] 2009   Swelling    Metformin 2013   Unspecified    Increased lactic acid       Sulfa Drugs 2010   Hives, Rash    RXN=since childhood    Tape 08/15/2012   Rash    Tears skin off   coban with Tegaderm tape ok  RXN=ongoing    Vancomycin 07/10/2016   Itching    Pt becomes flushed in face and chest.   RXN=7/10/16    Cephalexin [Keflex] 2017   Rash    Pt states she gets a rash with this medication    Levofloxacin 10/27/2016   Unspecified    Leg muscle cramps      You were diagnosed with     Atypical chest pain   [290102]       Essential hypertension   [6877406]         Vital Signs     Blood Pressure Pulse Temperature Respirations Height Weight    124/72 mmHg 82 36.2 °C (97.2 °F) 16 1.575 m (5' 2.01\") 117.935 kg (260 lb)    Body Mass Index Oxygen Saturation Smoking Status             47.54 kg/m2 97% Passive Smoke Exposure - Never Smoker         Basic " Information     Date Of Birth Sex Race Ethnicity Preferred Language    1990 Female White Non- English      Your appointments     Mar 30, 2017  3:40 PM   PREVIOUS PATIENT with PER Sim.   [facility] (--)    [address]  [place] [id number] [phone]            Apr 11, 2017  2:40 PM   Follow Up Visit with Sandoval Fox M.D.   [facility] (--)    [address]  [place] [id number] [phone]           You will be receiving a confirmation call a few days before your appointment from our automated call confirmation system.            Apr 12, 2017  9:00 AM   Individual Therapy with Blanca Brantley L.C.S.W.   [facility]    [address] [facility]  [address]  [place] [address] [phone]            Apr 17, 2017 11:00 AM   EST MISC Infusion 2 HR with RN 1   In[facility] ([facility])    [address]  [place] [address] [phone]            May 01, 2017  9:30 AM   Follow Up Med Management with Ronny Burnette M.D.   [facility] [address] [facility])    [address] [facility]  [address]  [place] [address]   [phone]            May 03, 2017  3:40 PM   Follow Up Visit with Sandoval Fox M.D.   [facility] (--)    [address] [facility] [address]  [place] [address] [phone]           You will be receiving a confirmation call a few days before your appointment from our automated call confirmation system.            May 16, 2017  1:20 PM   New Patient with Chencho Norman M.D.   [facility] (--)    [address]  [place] [address] [phone]           Please bring enclosed paperwork completed along with your insurance card and photo ID.            May 31, 2017  9:00 AM   Individual Therapy with Blanca Brantley L.C.S.W.   [facility]    [address]  [address]  [place] [address]    [phone]            Jul 19, 2017  8:40 AM   FOLLOW UP with Torres Kumar M.D.   [facility] (--)    [address]  [place] [id number] [phone]              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 Low  9/18/2010 - Present    Neurogenic bladder N31.9 Low  4/2/2011 - Present    Sinus tachycardia (Chronic) R00.0 High  10/31/2013 - Present    Knee pain, right M25.561 Low  2/13/2014 - Present    Anxiety F41.9 Low  12/16/2014 - Present    Fatty liver disease, nonalcoholic K76.0 Low  1/19/2015 - Present    Progressive focal motor weakness M62.81 Low  6/28/2015 - Present    Chronic back pain M54.9, G89.29 Low  6/29/2015 - Present    Hypothyroidism (Chronic) E03.9 Low  11/23/2015 - Present    PCOS (polycystic ovarian syndrome) E28.2 Low  11/23/2015 - Present    H/O prior ablation treatment Z98.890   2/10/2016 - Present    Peripheral neuropathy (CMS-HCC) (Chronic) G62.9   3/6/2016 - Present    TONYA on CPAP G47.33 Low  3/7/2016 - Present    Morbidly obese (CMS-HCC) E66.01   3/7/2016 - Present    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   3/7/2016 - Present    Scoliosis M41.9   3/7/2016 - Present    GERD (gastroesophageal reflux disease) (Chronic) K21.9   3/7/2016 - Present    Vitamin D deficiency E55.9   5/21/2016 - Present    Chronic inflammatory arthritis (Chronic) M19.90 Medium  5/23/2016 - Present    Right flank pain R10.9   6/6/2016 - Present    Weakness of both lower extremities M62.81   6/22/2016 - Present    Elevated sedimentation rate R70.0   6/27/2016 - Present    Galactorrhea O92.6   7/22/2016 - Present    Psychosis, schizophrenia, simple (HCC) (Chronic) F20.89 Low Chronic 9/29/2016 - Present    Schizophrenia (CMS-HCC) F20.9 Low  10/27/2016 - Present    Paralysis (CMS-HCC) G83.9 High  10/27/2016 - Present    Chronic pain syndrome (Chronic)  "G89.4 Medium  10/27/2016 - Present    Suprapubic catheter (CMS-HCC) (Chronic) Z93.59 Low  10/27/2016 - Present    Bowel and bladder incontinence R32, R15.9   10/27/2016 - Present    Depression F32.9 Low  10/28/2016 - Present    HTN (hypertension) (Chronic) I10 Medium  11/1/2016 - Present    Hypovitaminosis D E55.9   11/29/2016 - Present    Leg weakness M62.81   1/4/2017 - Present    Weakness R53.1   1/22/2017 - Present    Paraparesis of both lower limbs (CMS-[facility]) G82.20 High  1/24/2017 - Present    Chronic suprapubic catheter (CMS-[facility]) Z93.59   2/16/2017 - Present    Leg weakness, bilateral M62.81   2/22/2017 - Present    Weakness of right upper extremity M62.81   2/23/2017 - Present    UTI (urinary tract infection) N39.0 High  3/9/2017 - Present    Sepsis (CMS-HCC) A41.9   3/9/2017 - Present    Septic shock (CMS-HCC) A41.9, R65.21 High  3/16/2017 - Present      Health Maintenance        Date Due Completion Dates    IMM HEP A VACCINE (1 of 2 - Standard Series) 10/13/1990 ---    IMM VARICELLA (CHICKENPOX) VACCINE (1 of 2 - 2 Dose Adolescent Series) 10/13/2002 ---    PAP SMEAR 7/22/2019 7/22/2016 (Postponed), 2/19/2013 (N/S)    Override on 7/22/2016: Postponed (per pt was told could \"skip\" 2016)    Override on 2/19/2013: (N/S) ([facility])    IMM DTaP/Tdap/Td Vaccine (7 - Td) 8/6/2022 8/6/2012, 9/18/2010, 3/3/1994, 5/17/1991, 5/10/1990, 3/23/1990, 1989    COLONOSCOPY 9/25/2023 9/25/2013            Current Immunizations     Dtap Vaccine 3/3/1994, 5/17/1991, 5/10/1990, 3/23/1990, 1990    HIB Vaccine(PEDVAX) 1/11/1991    HPV Quadrivalent Vaccine (GARDASIL) 10/27/2011, 5/27/2011, 3/1/2011    Hepatitis B Vaccine Non-Recombivax (Ped/Adol) 1/28/2004, 10/28/2003, 10/29/1999    Influenza TIV (IM) 9/5/2013, 12/7/2011, 11/7/2011    Influenza Vaccine Adult HD 10/5/2016    MMR Vaccine 3/3/1994, 1/11/1991    OPV - Historical Data 3/3/1994, 5/17/1991, 5/10/1990, 3/23/1990, 1989    Pneumococcal Vaccine (PCV7) Historical " Data 11/8/2015    Pneumococcal polysaccharide vaccine (PPSV-23) 1/23/2017  5:35 PM    TD Vaccine 9/18/2010  4:45 PM    Tdap Vaccine 8/6/2012      Below and/or attached are the medications your provider expects you to take. Review all of your home medications and newly ordered medications with your provider and/or pharmacist. Follow medication instructions as directed by your provider and/or pharmacist. Please keep your medication list with you and share with your provider. Update the information when medications are discontinued, doses are changed, or new medications (including over-the-counter products) are added; and carry medication information at all times in the event of emergency situations     Allergies:  CEFDINIR - Shortness of Breath,Itching     DEPAKOTE - Unspecified     ABILIFY - Unspecified     AMITRIPTYLINE - Unspecified     AMOXICILLIN - Rash     CIPROFLOXACIN - Rash     CLINDAMYCIN - Nausea     DOXYCYCLINE - Vomiting,Nausea     EES - Vomiting,Nausea     FLAGYL - Unspecified     FLOMAX - Swelling     METFORMIN - Unspecified     SULFA DRUGS - Hives,Rash     TAPE - Rash     VANCOMYCIN - Itching     CEPHALEXIN - Rash     LEVOFLOXACIN - Unspecified               Medications  Valid as of: March 29, 2017 -  9:22 AM    Generic Name Brand Name Tablet Size Instructions for use    Albuterol Sulfate (Aero Soln) albuterol 108 (90 BASE) MCG/ACT Inhale 2 Puffs by mouth every 6 hours as needed for Shortness of Breath.        Aspirin (Tablet Delayed Response) ECOTRIN 81 MG Take 1 Tab by mouth every day.        Atorvastatin Calcium (Tab) LIPITOR 40 MG Take 1 Tab by mouth every bedtime.        Ciprofloxacin HCl (Tab) CIPRO 500 MG Take 1 Tab by mouth every 12 hours.        Cranberry (Cap) Cranberry 1000 MG Take 1 Cap by mouth 2 times a day, with meals.        Cyanocobalamin (Tab) vitamin b12 500 MCG Take 1,000 mcg by mouth 2 times a day.        Cyclobenzaprine HCl (Tab) FLEXERIL 10 MG Take 10 mg by mouth 2 Times a Day.  Indications: Muscle Spasm        Ergocalciferol (Cap) DRISDOL 36563 UNITS Take 50,000 Units by mouth every Friday.        FentaNYL (PATCH 72 HR) DURAGESIC 25 MCG/HR Apply 1 Patch to skin as directed every 72 hours.        FLUoxetine HCl (Cap) PROZAC 10 MG Take 1 Cap by mouth every day.        Gabapentin (Tab) NEURONTIN 600 MG Take 600-1,200 mg by mouth 2 times a day. 600 mg AM   1200 mg PM        Ivabradine HCl (Tab) CORLANOR 5 MG Take 5 mg by mouth 2 times a day, with meals.        Lactulose (Solution) lactulose 10 GM/15ML Take 10 g by mouth 2 times a day.        Levothyroxine Sodium (Tab) SYNTHROID 75 MCG Take 75 mcg by mouth Every morning on an empty stomach.        Melatonin (Tab) Melatonin 5 MG Take 10 mg by mouth every bedtime.        Nitroglycerin (SL Tab) NITROSTAT 0.4 MG Place 1 Tab under tongue as needed for Chest Pain.        Nystatin (Powder) MYCOSTATIN  Apply 1 Application to affected area(s) 3 times a day.        Omeprazole (CAPSULE DELAYED RELEASE) PRILOSEC 20 MG Take 20 mg by mouth 2 times a day.        OxyCODONE HCl (Tab) ROXICODONE 5 MG Take 1 Tab by mouth every four hours as needed.        Promethazine HCl (Tab) PHENERGAN 25 MG Take 1 Tab by mouth every 6 hours as needed for Nausea/Vomiting.        Sodium Bicarbonate (Tab) sodium bicarbonate 325 MG Take 2 Tabs by mouth 3 times a day.        Ziprasidone HCl (Cap) GEODON 80 MG Take 160 mg by mouth every evening. DO NOT GIVE PAST 1700        .                 Medicines prescribed today were sent to:     [id number] - [place], [place] - [address] [address]    Phone: [phone] Fax: [phone]    Open 24 Hours?: No      Medication refill instructions:       If your prescription bottle indicates you have medication refills left, it is not necessary to call your provider’s office. Please contact your pharmacy and they will refill your medication.    If your prescription bottle indicates you do not have any  refills left, you may request refills at any time through one of the following ways: The online [employer] system (except Urgent Care), by calling your provider’s office, or by asking your pharmacy to contact your provider’s office with a refill request. Medication refills are processed only during regular business hours and may not be available until the next business day. Your provider may request additional information or to have a follow-up visit with you prior to refilling your medication.   *Please Note: Medication refills are assigned a new Rx number when refilled electronically. Your pharmacy may indicate that no refills were authorized even though a new prescription for the same medication is available at the pharmacy. Please request the medicine by name with the pharmacy before contacting your provider for a refill.        Other Notes About Your Plan     DME:  [facility] / ph [phone] / fax [phone]              [email or web address] Access Code: Activation code not generated  Current [employer] Status: Active

## 2017-03-30 ENCOUNTER — OFFICE VISIT (OUTPATIENT)
Dept: CARDIOLOGY | Facility: MEDICAL CENTER | Age: 27
End: 2017-03-30
Payer: MEDICARE

## 2017-03-30 VITALS
HEIGHT: 62 IN | WEIGHT: 260 LBS | HEART RATE: 82 BPM | SYSTOLIC BLOOD PRESSURE: 132 MMHG | DIASTOLIC BLOOD PRESSURE: 76 MMHG | BODY MASS INDEX: 47.84 KG/M2 | OXYGEN SATURATION: 95 %

## 2017-03-30 DIAGNOSIS — Z98.890 H/O PRIOR ABLATION TREATMENT: ICD-10-CM

## 2017-03-30 DIAGNOSIS — R07.9 CHEST PAIN, UNSPECIFIED TYPE: ICD-10-CM

## 2017-03-30 DIAGNOSIS — R00.0 SINUS TACHYCARDIA: Chronic | ICD-10-CM

## 2017-03-30 DIAGNOSIS — I10 ESSENTIAL HYPERTENSION: Chronic | ICD-10-CM

## 2017-03-30 PROCEDURE — 93000 ELECTROCARDIOGRAM COMPLETE: CPT | Performed by: NURSE PRACTITIONER

## 2017-03-30 PROCEDURE — 665998 HH PPS REVENUE CREDIT

## 2017-03-30 PROCEDURE — G8482 FLU IMMUNIZE ORDER/ADMIN: HCPCS | Performed by: NURSE PRACTITIONER

## 2017-03-30 PROCEDURE — 1111F DSCHRG MED/CURRENT MED MERGE: CPT | Performed by: NURSE PRACTITIONER

## 2017-03-30 PROCEDURE — 1036F TOBACCO NON-USER: CPT | Performed by: NURSE PRACTITIONER

## 2017-03-30 PROCEDURE — G8419 CALC BMI OUT NRM PARAM NOF/U: HCPCS | Performed by: NURSE PRACTITIONER

## 2017-03-30 PROCEDURE — G8432 DEP SCR NOT DOC, RNG: HCPCS | Performed by: NURSE PRACTITIONER

## 2017-03-30 PROCEDURE — 665999 HH PPS REVENUE DEBIT

## 2017-03-30 PROCEDURE — 99214 OFFICE O/P EST MOD 30 MIN: CPT | Performed by: NURSE PRACTITIONER

## 2017-03-30 RX ORDER — CEFPODOXIME PROXETIL 200 MG/1
TABLET, FILM COATED ORAL
COMMUNITY
Start: 2017-01-18 | End: 2017-04-12

## 2017-03-30 RX ORDER — OXYCODONE AND ACETAMINOPHEN 10; 325 MG/1; MG/1
2 TABLET ORAL EVERY 6 HOURS
COMMUNITY
End: 2017-05-17

## 2017-03-30 RX ORDER — PREDNISONE 10 MG/1
TABLET ORAL
COMMUNITY
Start: 2017-02-27 | End: 2017-04-12

## 2017-03-30 RX ORDER — FLUCONAZOLE 150 MG/1
TABLET ORAL
COMMUNITY
Start: 2017-01-18 | End: 2017-04-12

## 2017-03-30 RX ORDER — GABAPENTIN 600 MG/1
600 TABLET, FILM COATED ORAL 3 TIMES DAILY
COMMUNITY
Start: 2017-02-14 | End: 2017-04-12

## 2017-03-30 ASSESSMENT — ENCOUNTER SYMPTOMS
DIZZINESS: 0
PND: 0
DOUBLE VISION: 0
CHILLS: 0
HEADACHES: 0
PALPITATIONS: 1
SENSORY CHANGE: 0
DIARRHEA: 0
PSYCHIATRIC NEGATIVE: 1
COUGH: 0
NAUSEA: 0
VOMITING: 0
FEVER: 0
TREMORS: 0
SHORTNESS OF BREATH: 1
LOSS OF CONSCIOUSNESS: 0
SORE THROAT: 0
SPEECH CHANGE: 0
ORTHOPNEA: 0
TINGLING: 0
HEARTBURN: 0
BLURRED VISION: 0
HEMOPTYSIS: 0
SPUTUM PRODUCTION: 0
WHEEZING: 0
WEIGHT LOSS: 0
FOCAL WEAKNESS: 0
WEAKNESS: 1
ABDOMINAL PAIN: 0

## 2017-03-30 NOTE — MR AVS SNAPSHOT
"        Kristin Balderrama   3/30/2017 3:40 PM   Office Visit   MRN: [id number]    Department:  [facility]   Dept Phone:  [phone]    Description:  Female : 1990   Provider:  VANIA Sim           Reason for Visit     Hospital Follow-up pt states last dose of nitro was 3/29/17      Allergies as of 3/30/2017     Allergen Noted Reactions    Cefdinir 2016   Shortness of Breath, Itching    Tolerated 17    Depakote [Divalproex Sodium] 2010   Unspecified    Muscle spasms/muscle pain and weakness      Abilify 2013   Unspecified    Headaches/muscle twitching      Amitriptyline 10/31/2013   Unspecified    Headaches      Amoxicillin 2010   Rash    Pt states \"I get a rash\".      Ciprofloxacin 2009   Rash    Pt states \"I get a rash\".      Clindamycin 2011   Nausea    Even with food      Doxycycline 08/15/2012   Vomiting, Nausea    RXN=unknown    Ees [Erythromycin] 2010   Vomiting, Nausea    Flagyl [Metronidazole Hcl] 2011   Unspecified    \"eye problems\"      Flomax [Tamsulosin Hydrochloride] 2009   Swelling    Metformin 2013   Unspecified    Increased lactic acid       Sulfa Drugs 2010   Hives, Rash    RXN=since childhood    Tape 08/15/2012   Rash    Tears skin off   coban with Tegaderm tape ok  RXN=ongoing    Vancomycin 07/10/2016   Itching    Pt becomes flushed in face and chest.   RXN=7/10/16    Cephalexin [Keflex] 2017   Rash    Pt states she gets a rash with this medication    Erythromycin 2017       Levofloxacin 10/27/2016   Unspecified    Leg muscle cramps    Metronidazole 2017       Valproic Acid 2017         You were diagnosed with     Chest pain, unspecified type   [[id number]]         Vital Signs     Blood Pressure Pulse Height Weight Body Mass Index Oxygen Saturation    132/76 mmHg 82 1.575 m (5' 2.01\") 117.935 kg (260 lb) 47.54 kg/m2 95%    Smoking Status                   Passive Smoke " Exposure - Never Smoker           Basic Information     Date Of Birth Sex Race Ethnicity Preferred Language    1990 Female White Non- English      Your appointments     Apr 11, 2017  2:40 PM   Follow Up Visit with Sandoval Fox M.D.   [facility] (--)    [address]  [place] [id number] [phone]           You will be receiving a confirmation call a few days before your appointment from our automated call confirmation system.            Apr 12, 2017  9:00 AM   Individual Therapy with Blanca Brantley L.C.S.W.   [facility]    [address]  [address]  [place] [id number] [phone]            Apr 17, 2017 11:00 AM   EST MISC Infusion 2 HR with RN 1   [facility] ([facility])    [address]  [place] [address]   [phone]            May 01, 2017  9:30 AM   Follow Up Med Management with Ronny Burnette M.D.   [facility] [address] [place] [address] ([facility])    [address]  [address]  [place] [address]   [phone]            May 03, 2017  3:40 PM   Follow Up Visit with Sandoval Fox M.D.   [facility] (--)    [address]  [place] [address] [phone]           You will be receiving a confirmation call a few days before your appointment from our automated call confirmation system.            May 16, 2017  1:20 PM   New Patient with Chencho Norman M.D.   [facility] (--)    [address]  [place] [address] [phone]           Please bring enclosed paperwork completed along with your insurance card and photo ID.            May 31, 2017  9:00 AM   Individual Therapy with Blanca Brantley L.C.S.W.   [facility]    [address] [facility] [address]  [address]  [place] [address] [phone]            Jul 19, 2017  8:40 AM   FOLLOW UP with Torres Kumar M.D.   [facility] (--)    [address] [place] [address]  [address]  [place] [address]   [phone]              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 Low  9/18/2010 - Present    Neurogenic bladder N31.9 Low  4/2/2011 - Present    Sinus tachycardia (Chronic) R00.0 High  10/31/2013 - Present    Knee pain, right M25.561 Low  2/13/2014 - Present    Anxiety F41.9 Low  12/16/2014 - Present    Fatty liver disease, nonalcoholic K76.0 Low  1/19/2015 - Present    Progressive focal motor weakness M62.81 Low  6/28/2015 - Present    Chronic back pain M54.9, G89.29 Low  6/29/2015 - Present    Hypothyroidism (Chronic) E03.9 Low  11/23/2015 - Present    PCOS (polycystic ovarian syndrome) E28.2 Low  11/23/2015 - Present    H/O prior ablation treatment Z98.890   2/10/2016 - Present    Peripheral neuropathy (CMS-HCC) (Chronic) G62.9   3/6/2016 - Present    TONYA on CPAP G47.33 Low  3/7/2016 - Present    Morbidly obese (CMS-HCC) E66.01   3/7/2016 - Present    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   3/7/2016 - Present    Scoliosis M41.9   3/7/2016 - Present    GERD (gastroesophageal reflux disease) (Chronic) K21.9   3/7/2016 - Present    Vitamin D deficiency E55.9   5/21/2016 - Present    Chronic inflammatory arthritis (Chronic) M19.90 Medium  5/23/2016 - Present    Right flank pain R10.9   6/6/2016 - Present    Weakness of both lower extremities M62.81   6/22/2016 - Present    Elevated sedimentation rate R70.0   6/27/2016 - Present    Galactorrhea O92.6   7/22/2016 - Present    Psychosis, schizophrenia, simple (HCC) (Chronic) F20.89 Low Chronic 9/29/2016 - Present    Schizophrenia (CMS-HCC) F20.9 Low  10/27/2016 - Present    Paralysis (CMS-HCC) G83.9 High  10/27/2016 - Present    Chronic pain syndrome (Chronic) G89.4 Medium  10/27/2016 - Present    Suprapubic catheter (CMS-HCC) (Chronic) Z93.59 Low  10/27/2016 - Present    Bowel and bladder incontinence R32, R15.9    "10/27/2016 - Present    Depression F32.9 Low  10/28/2016 - Present    HTN (hypertension) (Chronic) I10 Medium  11/1/2016 - Present    Hypovitaminosis D E55.9   11/29/2016 - Present    Leg weakness M62.81   1/4/2017 - Present    Weakness R53.1   1/22/2017 - Present    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 High  1/24/2017 - Present    Chronic suprapubic catheter (CMS-HCC) Z93.59   2/16/2017 - Present    Leg weakness, bilateral M62.81   2/22/2017 - Present    Weakness of right upper extremity M62.81   2/23/2017 - Present      Health Maintenance        Date Due Completion Dates    IMM HEP A VACCINE (1 of 2 - Standard Series) 10/13/1990 ---    IMM VARICELLA (CHICKENPOX) VACCINE (1 of 2 - 2 Dose Adolescent Series) 10/13/2002 ---    PAP SMEAR 7/22/2019 7/22/2016 (Postponed), 2/19/2013 (N/S)    Override on 7/22/2016: Postponed (per pt was told could \"skip\" 2016)    Override on 2/19/2013: (N/S) ([facility])    IMM DTaP/Tdap/Td Vaccine (7 - Td) 8/6/2022 8/6/2012, 9/18/2010, 3/3/1994, 5/17/1991, 5/10/1990, 3/23/1990, 1990    COLONOSCOPY 9/25/2023 9/25/2013            Results       Current Immunizations     Dtap Vaccine 3/3/1994, 5/17/1991, 5/10/1990, 3/23/1990, 1990    HIB Vaccine(PEDVAX) 1/11/1991    HPV Quadrivalent Vaccine (GARDASIL) 10/27/2011, 5/27/2011, 3/1/2011    Hepatitis B Vaccine Non-Recombivax (Ped/Adol) 1/28/2004, 10/28/2003, 10/29/1999    Influenza TIV (IM) 9/5/2013, 12/7/2011, 11/7/2011    Influenza Vaccine Adult HD 10/5/2016    MMR Vaccine 3/3/1994, 1/11/1991    OPV - Historical Data 3/3/1994, 5/17/1991, 5/10/1990, 3/23/1990, 1990    Pneumococcal Vaccine (PCV7) Historical Data 11/8/2015    Pneumococcal polysaccharide vaccine (PPSV-23) 1/23/2017  5:35 PM    TD Vaccine 9/18/2010  4:45 PM    Tdap Vaccine 8/6/2012      Below and/or attached are the medications your provider expects you to take. Review all of your home medications and newly ordered medications with your provider and/or " pharmacist. Follow medication instructions as directed by your provider and/or pharmacist. Please keep your medication list with you and share with your provider. Update the information when medications are discontinued, doses are changed, or new medications (including over-the-counter products) are added; and carry medication information at all times in the event of emergency situations     Allergies:  CEFDINIR - Shortness of Breath,Itching     DEPAKOTE - Unspecified     ABILIFY - Unspecified     AMITRIPTYLINE - Unspecified     AMOXICILLIN - Rash     CIPROFLOXACIN - Rash     CLINDAMYCIN - Nausea     DOXYCYCLINE - Vomiting,Nausea     EES - Vomiting,Nausea     FLAGYL - Unspecified     FLOMAX - Swelling     METFORMIN - Unspecified     SULFA DRUGS - Hives,Rash     TAPE - Rash     VANCOMYCIN - Itching     CEPHALEXIN - Rash     ERYTHROMYCIN - (reactions not documented)     LEVOFLOXACIN - Unspecified     METRONIDAZOLE - (reactions not documented)     VALPROIC ACID - (reactions not documented)               Medications  Valid as of: March 30, 2017 -  4:24 PM    Generic Name Brand Name Tablet Size Instructions for use    Albuterol Sulfate (Aero Soln) albuterol 108 (90 BASE) MCG/ACT Inhale 2 Puffs by mouth every 6 hours as needed for Shortness of Breath.        Aspirin (Tablet Delayed Response) ECOTRIN 81 MG Take 1 Tab by mouth every day.        Atorvastatin Calcium (Tab) LIPITOR 40 MG Take 1 Tab by mouth every bedtime.        Cefpodoxime Proxetil (Tab) VANTIN 200 MG         Ciprofloxacin HCl (Tab) CIPRO 500 MG Take 1 Tab by mouth every 12 hours.        Cranberry (Cap) Cranberry 1000 MG Take 1 Cap by mouth 2 times a day, with meals.        Cyanocobalamin (Tab) vitamin b12 500 MCG Take 1,000 mcg by mouth 2 times a day.        Cyclobenzaprine HCl (Tab) FLEXERIL 10 MG Take 10 mg by mouth 2 Times a Day. Indications: Muscle Spasm        Ergocalciferol (Cap) DRISDOL 89327 UNITS Take 50,000 Units by mouth every Friday.         FentaNYL (PATCH 72 HR) DURAGESIC 25 MCG/HR Apply 1 Patch to skin as directed every 72 hours.        Fluconazole (Tab) DIFLUCAN 150 MG         FLUoxetine HCl (Cap) PROZAC 10 MG Take 1 Cap by mouth every day.        Gabapentin (Tab) NEURONTIN 600 MG Take 600-1,200 mg by mouth 2 times a day. 600 mg AM   1200 mg PM        Gabapentin (Once-Daily) (Tab) GRALISE 600 MG         Ivabradine HCl (Tab) CORLANOR 5 MG Take 5 mg by mouth 2 times a day, with meals.        Lactulose (Solution) lactulose 10 GM/15ML Take 10 g by mouth 2 times a day.        Levothyroxine Sodium (Tab) SYNTHROID 75 MCG Take 75 mcg by mouth Every morning on an empty stomach.        Melatonin (Tab) Melatonin 5 MG Take 10 mg by mouth every bedtime.        Nitroglycerin (SL Tab) NITROSTAT 0.4 MG Place 1 Tab under tongue as needed for Chest Pain.        Nystatin (Powder) MYCOSTATIN  Apply 1 Application to affected area(s) 3 times a day.        Omeprazole (CAPSULE DELAYED RELEASE) PRILOSEC 20 MG Take 20 mg by mouth 2 times a day.        OxyCODONE HCl (Tab) ROXICODONE 5 MG Take 1 Tab by mouth every four hours as needed.        Oxycodone-Acetaminophen (Tab) PERCOCET-10  MG Take  by mouth.        PredniSONE (Tab) DELTASONE 10 MG         Promethazine HCl (Tab) PHENERGAN 25 MG Take 1 Tab by mouth every 6 hours as needed for Nausea/Vomiting.        Sodium Bicarbonate (Tab) sodium bicarbonate 325 MG Take 2 Tabs by mouth 3 times a day.        Ziprasidone HCl (Cap) GEODON 80 MG Take 160 mg by mouth every evening. DO NOT GIVE PAST 1700        .                 Medicines prescribed today were sent to:     [id number] - [place], [place] - [address]    [address] [place] [address]    Phone: [phone] Fax: [phone]    Open 24 Hours?: No      Medication refill instructions:       If your prescription bottle indicates you have medication refills left, it is not necessary to call your provider’s office. Please contact your pharmacy and they will  refill your medication.    If your prescription bottle indicates you do not have any refills left, you may request refills at any time through one of the following ways: The online [employer] system (except Urgent Care), by calling your provider’s office, or by asking your pharmacy to contact your provider’s office with a refill request. Medication refills are processed only during regular business hours and may not be available until the next business day. Your provider may request additional information or to have a follow-up visit with you prior to refilling your medication.   *Please Note: Medication refills are assigned a new Rx number when refilled electronically. Your pharmacy may indicate that no refills were authorized even though a new prescription for the same medication is available at the pharmacy. Please request the medicine by name with the pharmacy before contacting your provider for a refill.        Your To Do List     Future Labs/Procedures Complete By Expires    NM-CARDIAC STRESS TEST  As directed 9/30/2017      Other Notes About Your Plan     DME:  [facility] / ph [phone] / fax [phone]              [employer] Access Code: Activation code not generated  Current [employer] Status: Active

## 2017-03-30 NOTE — Clinical Note
[facility]   [address]  [place] [address]  Phone: [phone]  Fax: [phone]              Kristin Balderrama  1990    Encounter Date: 3/30/2017    VANIA Sim          PROGRESS NOTE:  No notes on file      Torres Brody M.D.  [address] [place] [address]  [facility] [address]  VIA In Basket

## 2017-03-30 NOTE — ADDENDUM NOTE
Encounter addended by: Mayra Bauman R.N. on: 3/30/2017  9:51 AM<BR>     Documentation filed: Inpatient Document Flowsheet

## 2017-03-30 NOTE — PROGRESS NOTES
Subjective:   Kristin Balderrama is a 26 y.o. female who presents today for hospital follow up for chest pain.    She is followed by Dr. Kumar.  Past history significant for IAST S/P ablation, HTN, schizophrenia, conversion disorder, depression, neuropathy, LE weakness, TONYA on CPAP, chronic suprapubic catheter.     Today in follow up she states that she has been experiencing chest pain.  She states that while in [facility] rehabbing sepsis she awoke one night with a squeezing chest pain.  She was given ntg per the house MD there which apparently resolved the chest pain.  She was transported to the ED.  Where initial workup was negative (trip neg, BNP neg, CBC without anemia, ekg was neg for acute findings), recent echo (3/1/17) with LVEF 60%, no wall motion abnormalities detected, some mild LVH.         Today she states that chest pain is still occuring, has happened 3 times this week.  States symptoms are squeezing sensation, can travel to left face and arm, has reported associated flushing, sometimes N/V.  She states that symptoms can last as long as 5 hours if she does not take a ntg, so she has been using ntg to resolve her symptoms.  She states sometimes occurs when her heart is 'racing' up to 170 or 190, which according to Kristin occurs 'often.'    She is on supplemental oxygen, states that she continues to feel dyspneic all of the time.  States that her oxygen saturations drop at home.      Past Medical History   Diagnosis Date   • Scoliosis    • Multiple personality disorder    • Chronic UTI 9/18/2010   • Heart burn    • Pain 08-15-12     back, 7/10   • History of falling    • Sinus tachycardia 10/31/2013   • Urinary incontinence    • Hypertension    • Disorder of thyroid    • Obesity    • Pneumonia 2012   • ASTHMA      when around smoke   • Breath shortness      with tachycardia   • Anginal syndrome      random chest pain especially with tachycardia   • Psychosis    • Arthritis      osteo   • PCOS  "(polycystic ovarian syndrome)    • Gynecological disorder      PCOS   • Renal disorder      \"kidney disease, stage 1\" nephrologist, Dr. Vallejo   • Arrhythmia      \"sinus tachycardia\", cariologist, Dr. 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   • Sleep apnea      CPAP \"pulmonary doctor took me off mid year 2016\"   • 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CMS-HCC)    • Fall      Past Surgical History   Procedure Laterality Date   • Neuro dest facet l/s w/ig sngl  4/21/2015     Performed by Reza Tabor at [facility]   • Recovery  1/27/2016     Procedure: CATH LAB EP STUDY WITH SINUS NODE MODIFICATION ABHINAV;  Surgeon: [facility];  [place] [facility] [place]:    • Katie by laparoscopy  8/29/2010     Performed by SHAYY JOHNS at [facility]   • Lumbar fusion anterior  8/21/2012     Performed by SUSIE SAWANT at [facility]   • Other cardiac surgery  1/2016     cardiac ablation   • Tonsillectomy       tonsillectomy   • Bowel stimulator insertion  7/13/2016     Procedure: BOWEL STIMULATOR INSERTION FOR PERMANENT INTERSTIM SACRAL IMPLANT;  Surgeon: Joe Noyola M.D.;  Location: SURGERY [facility];  Service:    • Gastroscopy with balloon dilatation N/A 1/4/2017     Procedure: GASTROSCOPY WITH DILATATION;  Surgeon: Torres Vargas M.D.;  Location: [facility];  Service:    • Muscle biopsy Right 1/26/2017     Procedure: MUSCLE BIOPSY - THIGH;  Surgeon: Isidro Vigil M.D.;  Location: SURGERY [facility];  Service:    • Other abdominal surgery       Family History   Problem Relation Age of Onset   • Hypertension Mother    • Sleep Apnea Mother    • Heart Disease Mother    • Other Mother      hypothryod   • Hypertension Maternal Uncle    • Heart Disease Maternal Grandmother    • Hypertension Maternal Grandmother    • Other Sister      " "Narcolepsy;fibromyalsia;bone;nerve   • Genitourinary () Sister      endometriosis     History   Smoking status   • Passive Smoke Exposure - Never Smoker   • Types: Cigarettes   Smokeless tobacco   • Never Used     Allergies   Allergen Reactions   • Cefdinir Shortness of Breath and Itching     Tolerated 1/18/17   • Depakote [Divalproex Sodium] Unspecified     Muscle spasms/muscle pain and weakness     • Abilify Unspecified     Headaches/muscle twitching     • Amitriptyline Unspecified     Headaches     • Amoxicillin Rash     Pt states \"I get a rash\".     • Ciprofloxacin Rash     Pt states \"I get a rash\".     • Clindamycin Nausea     Even with food     • Doxycycline Vomiting and Nausea     RXN=unknown   • Ees [Erythromycin] Vomiting and Nausea   • Flagyl [Metronidazole Hcl] Unspecified     \"eye problems\"     • Flomax [Tamsulosin Hydrochloride] Swelling   • Metformin Unspecified     Increased lactic acid      • Sulfa Drugs Hives and Rash     RXN=since childhood   • Tape Rash     Tears skin off   coban with Tegaderm tape ok  RXN=ongoing   • Vancomycin Itching     Pt becomes flushed in face and chest.   RXN=7/10/16   • Cephalexin [Keflex] Rash     Pt states she gets a rash with this medication   • Erythromycin    • Levofloxacin Unspecified     Leg muscle cramps   • Metronidazole    • Valproic Acid      Outpatient Encounter Prescriptions as of 3/30/2017   Medication Sig Dispense Refill   • predniSONE (DELTASONE) 10 MG Tab      • GRALISE 600 MG Tab      • fluconazole (DIFLUCAN) 150 MG tablet      • cefpodoxime (VANTIN) 200 MG Tab      • oxycodone-acetaminophen (PERCOCET-10)  MG Tab Take  by mouth.     • nitroglycerin (NITROSTAT) 0.4 MG SL Tab Place 1 Tab under tongue as needed for Chest Pain. 25 Tab 1   • oxycodone immediate-release (ROXICODONE) 5 MG Tab Take 1 Tab by mouth every four hours as needed. 30 Tab 0   • ciprofloxacin (CIPRO) 500 MG Tab Take 1 Tab by mouth every 12 hours. 14 Tab 0   • nystatin " (MYCOSTATIN) Powder Apply 1 Application to affected area(s) 3 times a day. 1 Bottle 2   • Cranberry 1000 MG Cap Take 1 Cap by mouth 2 times a day, with meals. 60 Cap 3   • gabapentin (NEURONTIN) 600 MG tablet Take 600-1,200 mg by mouth 2 times a day. 600 mg AM   1200 mg PM     • Melatonin 5 MG Tab Take 10 mg by mouth every bedtime.     • ivabradine (CORLANOR) 5 MG Tab tablet Take 5 mg by mouth 2 times a day, with meals.     • atorvastatin (LIPITOR) 40 MG Tab Take 1 Tab by mouth every bedtime. 30 Tab 0   • aspirin EC (ECOTRIN) 81 MG Tablet Delayed Response Take 1 Tab by mouth every day. 30 Tab 0   • ziprasidone (GEODON) 80 MG Cap Take 160 mg by mouth every evening. DO NOT GIVE PAST 1700     • fentanyl (DURAGESIC) 25 MCG/HR PATCH 72 HR Apply 1 Patch to skin as directed every 72 hours.     • lactulose 10 GM/15ML Solution Take 10 g by mouth 2 times a day.     • vitamin D, Ergocalciferol, (DRISDOL) 27678 UNITS Cap capsule Take 50,000 Units by mouth every Friday.     • cyclobenzaprine (FLEXERIL) 10 MG Tab Take 10 mg by mouth 2 Times a Day. Indications: Muscle Spasm     • promethazine (PHENERGAN) 25 MG Tab Take 1 Tab by mouth every 6 hours as needed for Nausea/Vomiting. 30 Tab 0   • albuterol 108 (90 BASE) MCG/ACT Aero Soln inhalation aerosol Inhale 2 Puffs by mouth every 6 hours as needed for Shortness of Breath.     • cyanocobalamin (HM VITAMIN B12) 500 MCG tablet Take 1,000 mcg by mouth 2 times a day.     • sodium bicarbonate 325 MG Tab Take 2 Tabs by mouth 3 times a day.     • levothyroxine (SYNTHROID) 75 MCG Tab Take 75 mcg by mouth Every morning on an empty stomach.     • omeprazole (PRILOSEC) 20 MG delayed-release capsule Take 20 mg by mouth 2 times a day.     • fluoxetine (PROZAC) 10 MG Cap Take 1 Cap by mouth every day. 30 Cap 1     No facility-administered encounter medications on file as of 3/30/2017.     Review of Systems   Constitutional: Positive for malaise/fatigue. Negative for fever, chills and weight  "loss.   HENT: Negative for congestion, nosebleeds, sore throat and tinnitus.    Eyes: Negative for blurred vision and double vision.   Respiratory: Positive for shortness of breath. Negative for cough, hemoptysis, sputum production and wheezing.    Cardiovascular: Positive for chest pain and palpitations (tachycardia). Negative for orthopnea, leg swelling and PND.   Gastrointestinal: Negative for heartburn, nausea, vomiting, abdominal pain and diarrhea.   Genitourinary:        Andrade   Musculoskeletal:        C/O weakness   Skin: Negative for rash.   Neurological: Positive for weakness. Negative for dizziness, tingling, tremors, sensory change, speech change, focal weakness, loss of consciousness and headaches.   Psychiatric/Behavioral: Negative.         Objective:   /76 mmHg  Pulse 82  Ht 1.575 m (5' 2.01\")  Wt 117.935 kg (260 lb)  BMI 47.54 kg/m2  SpO2 95%    Physical Exam   Constitutional: She is oriented to person, place, and time. She appears well-developed and well-nourished.   HENT:   Head: Normocephalic and atraumatic.   Eyes: Conjunctivae are normal.   Neck: Normal range of motion. Neck supple. No JVD present.   Cardiovascular: Normal rate, regular rhythm, normal heart sounds and intact distal pulses.  Exam reveals no gallop and no friction rub.    No murmur heard.  Pulmonary/Chest: Effort normal and breath sounds normal. No respiratory distress. She has no wheezes. She has no rales.   NC, supplemental 02   Abdominal: Soft. Bowel sounds are normal. There is no tenderness.   Genitourinary:   Andrade   Musculoskeletal: Normal range of motion. She exhibits no edema.   Uses walker    Neurological: She is alert and oriented to person, place, and time.   Skin: Skin is warm and dry.   Psychiatric: Her affect is angry. She is aggressive.       Assessment:     1. Chest pain, unspecified type  EKG    NM-CARDIAC STRESS TEST   2. Sinus tachycardia     3. Essential hypertension     4. H/O prior ablation " treatment         Medical Decision Making:  Today's Assessment / Status / Plan:   1. Chest pain:  - Reviewed with patient recent cardiac workup in the ED which was negative.  Her echocardiogram completed this month without any acute anomalities suggestive of factors contributing to chest pain.  Her D Dimer is WNL.    - Suggested that NMST can be completed to rule out coronary component.  Discussed that if stress test comes back normal that other causes of chest pain should be explored.  Patient states 'even if stress test is normal I refuse to believe that chest pain is anything other than my heart.'   - If stress test negative should consider other treatment of chest pain besides frequent use of SL NTG.  I voiced this concern with her and she aggressively voices her discontent with the idea of this.   - Patient states that chest pain also occurs when her heart is racing.  I offered her a monitor today to see burden of tachycardia, she refuses today.   - ? Query whether or not mental health component in symptoms.        2. HTN:    - Blood pressure appropriate in the office today.     3. ST:  - Continue Corlanor.  She refused repeat monitor as above.    Will follow up after stress test completed, sooner if needed.  Collaborating MD: Linsey.

## 2017-03-31 PROCEDURE — 665998 HH PPS REVENUE CREDIT

## 2017-03-31 PROCEDURE — 665999 HH PPS REVENUE DEBIT

## 2017-04-01 PROCEDURE — 665999 HH PPS REVENUE DEBIT

## 2017-04-01 PROCEDURE — 665998 HH PPS REVENUE CREDIT

## 2017-04-02 PROCEDURE — 665999 HH PPS REVENUE DEBIT

## 2017-04-02 PROCEDURE — 665998 HH PPS REVENUE CREDIT

## 2017-04-03 LAB — EKG IMPRESSION: NORMAL

## 2017-04-03 PROCEDURE — 665999 HH PPS REVENUE DEBIT

## 2017-04-03 PROCEDURE — 665998 HH PPS REVENUE CREDIT

## 2017-04-04 ENCOUNTER — OFFICE VISIT (OUTPATIENT)
Dept: MEDICAL GROUP | Facility: CLINIC | Age: 27
End: 2017-04-04
Payer: MEDICARE

## 2017-04-04 VITALS
DIASTOLIC BLOOD PRESSURE: 80 MMHG | HEART RATE: 95 BPM | RESPIRATION RATE: 16 BRPM | OXYGEN SATURATION: 96 % | SYSTOLIC BLOOD PRESSURE: 118 MMHG | TEMPERATURE: 97.2 F

## 2017-04-04 DIAGNOSIS — N30.00 ACUTE CYSTITIS WITHOUT HEMATURIA: ICD-10-CM

## 2017-04-04 PROCEDURE — G8419 CALC BMI OUT NRM PARAM NOF/U: HCPCS | Performed by: NURSE PRACTITIONER

## 2017-04-04 PROCEDURE — 1036F TOBACCO NON-USER: CPT | Performed by: NURSE PRACTITIONER

## 2017-04-04 PROCEDURE — 665998 HH PPS REVENUE CREDIT

## 2017-04-04 PROCEDURE — G8432 DEP SCR NOT DOC, RNG: HCPCS | Performed by: NURSE PRACTITIONER

## 2017-04-04 PROCEDURE — 99215 OFFICE O/P EST HI 40 MIN: CPT | Performed by: NURSE PRACTITIONER

## 2017-04-04 PROCEDURE — 1111F DSCHRG MED/CURRENT MED MERGE: CPT | Performed by: NURSE PRACTITIONER

## 2017-04-04 PROCEDURE — 665999 HH PPS REVENUE DEBIT

## 2017-04-04 RX ORDER — CIPROFLOXACIN 500 MG/1
500 TABLET, FILM COATED ORAL EVERY 12 HOURS
Qty: 28 TAB | Refills: 0 | Status: SHIPPED | OUTPATIENT
Start: 2017-04-04 | End: 2017-04-18

## 2017-04-04 ASSESSMENT — ENCOUNTER SYMPTOMS
FLANK PAIN: 1
ABDOMINAL PAIN: 1
FEVER: 1
NAUSEA: 1
VOMITING: 1
CHILLS: 1

## 2017-04-04 NOTE — MR AVS SNAPSHOT
"        Kristin Armstrong Tacos   2017 11:00 AM   Office Visit   MRN: [id number]    Department:  [facility]   Dept Phone:  [phone]    Description:  Female : 1990   Provider:  VANIA Neves           Reason for Visit     Dysuria bladder pain/ abdominal pain/ nausea/ clowdy urine on catheter 4-5 days ago.       Allergies as of 2017     Allergen Noted Reactions    Cefdinir 2016   Shortness of Breath, Itching    Tolerated 17    Depakote [Divalproex Sodium] 2010   Unspecified    Muscle spasms/muscle pain and weakness      Abilify 2013   Unspecified    Headaches/muscle twitching      Amitriptyline 10/31/2013   Unspecified    Headaches      Amoxicillin 2010   Rash    Pt states \"I get a rash\".      Ciprofloxacin 2009   Rash    Pt states \"I get a rash\".      Clindamycin 2011   Nausea    Even with food      Doxycycline 08/15/2012   Vomiting, Nausea    RXN=unknown    Ees [Erythromycin] 2010   Vomiting, Nausea    Flagyl [Metronidazole Hcl] 2011   Unspecified    \"eye problems\"      Flomax [Tamsulosin Hydrochloride] 2009   Swelling    Metformin 2013   Unspecified    Increased lactic acid       Sulfa Drugs 2010   Hives, Rash    RXN=since childhood    Tape 08/15/2012   Rash    Tears skin off   coban with Tegaderm tape ok  RXN=ongoing    Vancomycin 07/10/2016   Itching    Pt becomes flushed in face and chest.   RXN=7/10/16    Cephalexin [Keflex] 2017   Rash    Pt states she gets a rash with this medication    Erythromycin 2017       Levofloxacin 10/27/2016   Unspecified    Leg muscle cramps    Metronidazole 2017       Valproic Acid 2017         You were diagnosed with     Acute cystitis without hematuria   [645894]         Vital Signs     Blood Pressure Pulse Temperature Respirations Oxygen Saturation       118/80 mmHg 95 36.2 °C (97.2 °F) 16 96%     Smoking Status                   Passive Smoke " Exposure - Never Smoker           Basic Information     Date Of Birth Sex Race Ethnicity Preferred Language    1990 Female White Non- English      Your appointments     Apr 06, 2017  9:30 AM   NM CARDIAC STRESS TEST (30) with [facility] NM DSPECT 2   [facility] ([facility])    [address] [facility]  [place] [id number] [phone]           NPO for 4 hours prior to scan.  No caffeine for 24 hours prior to test (decaf, coffee, cola, tea, chocolate, Excedrin, and Anacin).  No Viagra or other ED meds for 48 hours.  Warn patient of length of test and to bring list of meds.            Apr 11, 2017  2:40 PM   Follow Up Visit with Sandoval Fox M.D.   [facility] (--)    [address], [address]  [place] [address] [phone]           You will be receiving a confirmation call a few days before your appointment from our automated call confirmation system.            Apr 12, 2017  9:00 AM   Individual Therapy with Blanca Brantley L.C.S.W.   [facility] [place] [place]    [address]  [address]  [place] [id number] [phone]            Apr 14, 2017 11:40 AM   Established Patient with Torres Brody M.D.   [facility] [address]    [address]  [place] [address] [phone]           You will be receiving a confirmation call a few days before your appointment from our automated call confirmation system.            Apr 17, 2017 11:00 AM   EST MISC Infusion 2 HR with RN 1   [facility] ([facility])    [address] [facility] [address] [address]  [place] [address] [phone]            May 01, 2017  9:30 AM   Follow Up Med Management with Ronny Burnette M.D.   [facility] [address] [place] [address])    [address] [facility] [address]  [address]  [place] [address]   [phone]            May 03, 2017  3:40 PM   Follow Up Visit with Sandoval Fox M.D.   [facility] (--)    [address]  [place]  [place] [address]   [phone]           You will be receiving a confirmation call a few days before your appointment from our automated call confirmation system.            May 16, 2017  1:20 PM   New Patient with Chencho Norman M.D.   [facility] (--)    [address]  [address]  [place] [address]   [phone]           Please bring enclosed paperwork completed along with your insurance card and photo ID.            May 31, 2017  9:00 AM   Individual Therapy with Blanca Brantley L.C.S.W.   [facility] [place]    [address]  [address]  [place] [address] [phone]            Jul 19, 2017  8:40 AM   FOLLOW UP with Torres Kumar M.D.   [facility] (--)    [address] [place] [address] [address]  [place] [id number] [phone]              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 Low  9/18/2010 - Present    Neurogenic bladder N31.9 Low  4/2/2011 - Present    Sinus tachycardia (Chronic) R00.0 High  10/31/2013 - Present    Knee pain, right M25.561 Low  2/13/2014 - Present    Anxiety F41.9 Low  12/16/2014 - Present    Fatty liver disease, nonalcoholic K76.0 Low  1/19/2015 - Present    Progressive focal motor weakness M62.81 Low  6/28/2015 - Present    Chronic back pain M54.9, G89.29 Low  6/29/2015 - Present    Hypothyroidism (Chronic) E03.9 Low  11/23/2015 - Present    PCOS (polycystic ovarian syndrome) E28.2 Low  11/23/2015 - Present    H/O prior ablation treatment Z98.890   2/10/2016 - Present    Peripheral neuropathy (CMS-HCC) (Chronic) G62.9   3/6/2016 - Present    TONYA on CPAP G47.33 Low  3/7/2016 - Present    Morbidly obese (CMS-HCC) E66.01   3/7/2016 - Present    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   3/7/2016 - Present    Scoliosis M41.9   3/7/2016 - Present    GERD (gastroesophageal reflux disease) (Chronic) K21.9   " 3/7/2016 - Present    Vitamin D deficiency E55.9   5/21/2016 - Present    Chronic inflammatory arthritis (Chronic) M19.90 Medium  5/23/2016 - Present    Right flank pain R10.9   6/6/2016 - Present    Weakness of both lower extremities M62.81   6/22/2016 - Present    Elevated sedimentation rate R70.0   6/27/2016 - Present    Galactorrhea O92.6   7/22/2016 - Present    Psychosis, schizophrenia, simple (HCC) (Chronic) F20.89 Low Chronic 9/29/2016 - Present    Schizophrenia (CMS-HCC) F20.9 Low  10/27/2016 - Present    Paralysis (CMS-HCC) G83.9 High  10/27/2016 - Present    Chronic pain syndrome (Chronic) G89.4 Medium  10/27/2016 - Present    Suprapubic catheter (CMS-HCC) (Chronic) Z93.59 Low  10/27/2016 - Present    Bowel and bladder incontinence R32, R15.9   10/27/2016 - Present    Depression F32.9 Low  10/28/2016 - Present    HTN (hypertension) (Chronic) I10 Medium  11/1/2016 - Present    Hypovitaminosis D E55.9   11/29/2016 - Present    Leg weakness M62.81   1/4/2017 - Present    Weakness R53.1   1/22/2017 - Present    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 High  1/24/2017 - Present    Chronic suprapubic catheter (CMS-HCC) Z93.59   2/16/2017 - Present    Leg weakness, bilateral M62.81   2/22/2017 - Present    Weakness of right upper extremity M62.81   2/23/2017 - Present    Chest pain R07.9   3/30/2017 - Present      Health Maintenance        Date Due Completion Dates    IMM HEP A VACCINE (1 of 2 - Standard Series) 10/13/1990 ---    IMM VARICELLA (CHICKENPOX) VACCINE (1 of 2 - 2 Dose Adolescent Series) 10/13/2002 ---    PAP SMEAR 7/22/2019 7/22/2016 (Postponed), 2/19/2013 (N/S)    Override on 7/22/2016: Postponed (per pt was told could \"skip\" 2016)    Override on 2/19/2013: (N/S) ([facility])    IMM DTaP/Tdap/Td Vaccine (7 - Td) 8/6/2022 8/6/2012, 9/18/2010, 3/3/1994, 5/17/1991, 5/10/1990, 3/23/1990, 1990    COLONOSCOPY 9/25/2023 9/25/2013            Current Immunizations     Dtap Vaccine 3/3/1994, " 5/17/1991, 5/10/1990, 3/23/1990, 1990    HIB Vaccine(PEDVAX) 1/11/1991    HPV Quadrivalent Vaccine (GARDASIL) 10/27/2011, 5/27/2011, 3/1/2011    Hepatitis B Vaccine Non-Recombivax (Ped/Adol) 1/28/2004, 10/28/2003, 10/29/1999    Influenza TIV (IM) 9/5/2013, 12/7/2011, 11/7/2011    Influenza Vaccine Adult HD 10/5/2016    MMR Vaccine 3/3/1994, 1/11/1991    OPV - Historical Data 3/3/1994, 5/17/1991, 5/10/1990, 3/23/1990, 1990    Pneumococcal Vaccine (PCV7) Historical Data 11/8/2015    Pneumococcal polysaccharide vaccine (PPSV-23) 1/23/2017  5:35 PM    TD Vaccine 9/18/2010  4:45 PM    Tdap Vaccine 8/6/2012      Below and/or attached are the medications your provider expects you to take. Review all of your home medications and newly ordered medications with your provider and/or pharmacist. Follow medication instructions as directed by your provider and/or pharmacist. Please keep your medication list with you and share with your provider. Update the information when medications are discontinued, doses are changed, or new medications (including over-the-counter products) are added; and carry medication information at all times in the event of emergency situations     Allergies:  CEFDINIR - Shortness of Breath,Itching     DEPAKOTE - Unspecified     ABILIFY - Unspecified     AMITRIPTYLINE - Unspecified     AMOXICILLIN - Rash     CIPROFLOXACIN - Rash     CLINDAMYCIN - Nausea     DOXYCYCLINE - Vomiting,Nausea     EES - Vomiting,Nausea     FLAGYL - Unspecified     FLOMAX - Swelling     METFORMIN - Unspecified     SULFA DRUGS - Hives,Rash     TAPE - Rash     VANCOMYCIN - Itching     CEPHALEXIN - Rash     ERYTHROMYCIN - (reactions not documented)     LEVOFLOXACIN - Unspecified     METRONIDAZOLE - (reactions not documented)     VALPROIC ACID - (reactions not documented)               Medications  Valid as of: April 04, 2017 - 12:09 PM    Generic Name Brand Name Tablet Size Instructions for use    Albuterol Sulfate (Aero  Soln) albuterol 108 (90 BASE) MCG/ACT Inhale 2 Puffs by mouth every 6 hours as needed for Shortness of Breath.        Aspirin (Tablet Delayed Response) ECOTRIN 81 MG Take 1 Tab by mouth every day.        Atorvastatin Calcium (Tab) LIPITOR 40 MG Take 1 Tab by mouth every bedtime.        Cefpodoxime Proxetil (Tab) VANTIN 200 MG         Ciprofloxacin HCl (Tab) CIPRO 500 MG Take 1 Tab by mouth every 12 hours for 14 days.        Cranberry (Cap) Cranberry 1000 MG Take 1 Cap by mouth 2 times a day, with meals.        Cyanocobalamin (Tab) vitamin b12 500 MCG Take 1,000 mcg by mouth 2 times a day.        Cyclobenzaprine HCl (Tab) FLEXERIL 10 MG Take 10 mg by mouth 2 Times a Day. Indications: Muscle Spasm        Ergocalciferol (Cap) DRISDOL 12453 UNITS Take 50,000 Units by mouth every Friday.        FentaNYL (PATCH 72 HR) DURAGESIC 25 MCG/HR Apply 1 Patch to skin as directed every 72 hours.        Fluconazole (Tab) DIFLUCAN 150 MG         FLUoxetine HCl (Cap) PROZAC 10 MG Take 1 Cap by mouth every day.        Gabapentin (Tab) NEURONTIN 600 MG Take 600-1,200 mg by mouth 2 times a day. 600 mg AM   1200 mg PM        Gabapentin (Once-Daily) (Tab) GRALISE 600 MG         Ivabradine HCl (Tab) CORLANOR 5 MG Take 5 mg by mouth 2 times a day, with meals.        Lactulose (Solution) lactulose 10 GM/15ML Take 10 g by mouth 2 times a day.        Levothyroxine Sodium (Tab) SYNTHROID 75 MCG Take 75 mcg by mouth Every morning on an empty stomach.        Melatonin (Tab) Melatonin 5 MG Take 10 mg by mouth every bedtime.        Nitroglycerin (SL Tab) NITROSTAT 0.4 MG Place 1 Tab under tongue as needed for Chest Pain.        Nystatin (Powder) MYCOSTATIN  Apply 1 Application to affected area(s) 3 times a day.        Omeprazole (CAPSULE DELAYED RELEASE) PRILOSEC 20 MG Take 20 mg by mouth 2 times a day.        OxyCODONE HCl (Tab) ROXICODONE 5 MG Take 1 Tab by mouth every four hours as needed.        Oxycodone-Acetaminophen (Tab) PERCOCET-10   MG Take  by mouth.        PredniSONE (Tab) DELTASONE 10 MG         Promethazine HCl (Tab) PHENERGAN 25 MG Take 1 Tab by mouth every 6 hours as needed for Nausea/Vomiting.        Sodium Bicarbonate (Tab) sodium bicarbonate 325 MG Take 2 Tabs by mouth 3 times a day.        Ziprasidone HCl (Cap) GEODON 80 MG Take 160 mg by mouth every evening. DO NOT GIVE PAST 1700        .                 Medicines prescribed today were sent to:     [id number] - [place], [place] - [address] [place] [address]    Phone: [phone] Fax: [phone]    Open 24 Hours?: No      Medication refill instructions:       If your prescription bottle indicates you have medication refills left, it is not necessary to call your provider’s office. Please contact your pharmacy and they will refill your medication.    If your prescription bottle indicates you do not have any refills left, you may request refills at any time through one of the following ways: The online [employer] system (except Urgent Care), by calling your provider’s office, or by asking your pharmacy to contact your provider’s office with a refill request. Medication refills are processed only during regular business hours and may not be available until the next business day. Your provider may request additional information or to have a follow-up visit with you prior to refilling your medication.   *Please Note: Medication refills are assigned a new Rx number when refilled electronically. Your pharmacy may indicate that no refills were authorized even though a new prescription for the same medication is available at the pharmacy. Please request the medicine by name with the pharmacy before contacting your provider for a refill.        Your To Do List     Future Labs/Procedures Complete By Expires    URINALYSIS,CULTURE IF INDICATED  As directed 4/4/2018      Referral     A referral request has been sent to our patient care coordination department.  Please allow 3-5 business days for us to process this request and contact you either by phone or mail. If you do not hear from us by the 5th business day, please call us at [phone].        Other Notes About Your Plan     DME:  [facility] / ph [phone] / fax [phone]              MyChart Access Code: Activation code not generated  Current MyChart Status: Active

## 2017-04-04 NOTE — PROGRESS NOTES
"Chief Complaint   Patient presents with   • Dysuria     bladder pain/ abdominal pain/ nausea/ clowdy urine on catheter 4-5 days ago.        HISTORY OF PRESENT ILLNESS: Patient is a 26 y.o. female established patient who presents today due to 4-5 days hx of chills, right flank pain, nausea, vomiting. Pt has most recent hx of sepsis secondary to UTI in march. Pt has neurogenic bladder with suprapubic jara, chronic UTI. Pt's grandmother reports that pt was on macrobid but it is not helping anymore. Pt was sent home last time with cipro but she reports that she has rashes. However, she has been reporting allergic to lots of antibiotics. She denied the allergic reaction is affecting her airway. She is very irritable and reports,\"sent whatever you want, I am done.\" She has been giving hard time to our medical assistance. I have been explained and talked to mother who is more reasonable. Mother reports that pt has an appointment with urologist but usually they would just change jara and they don't care about pt's UTI which I doubt. They report that they cannot wait until next week to have jara change and pt is just out of [facility] on 3[facility]. HH was sent but they have hard time contacting new HH. Pt was with [facility] but for unknown reason they are referred to different home health and they don't remember the name. Pt was frequently readmitted, most recent one from 3/16 to 3/20, seen by PCP team for hospital follow up on 3/29 but she has no UTI symptoms at that time.       Patient Active Problem List    Diagnosis Date Noted   • Paraparesis of both lower limbs (CMS-[facility]) 01/24/2017     Priority: High   • Paralysis (CMS-[facility]) 10/27/2016     Priority: High   • Sinus tachycardia 10/31/2013     Priority: High   • HTN (hypertension) 11/01/2016     Priority: Medium   • Chronic pain syndrome 10/27/2016     Priority: Medium   • Chronic inflammatory arthritis 05/23/2016     Priority: Medium   • Depression 10/28/2016     " Priority: Low   • Schizophrenia (CMS-HCC) 10/27/2016     Priority: Low   • Suprapubic catheter (CMS-HCC) 10/27/2016     Priority: Low   • Psychosis, schizophrenia, simple ([facility]) 09/29/2016     Priority: Low     Class: Chronic   • TONYA on CPAP 03/07/2016     Priority: Low   • Hypothyroidism 11/23/2015     Priority: Low   • PCOS (polycystic ovarian syndrome) 11/23/2015     Priority: Low   • Chronic back pain 06/29/2015     Priority: Low   • Progressive focal motor weakness 06/28/2015     Priority: Low   • Fatty liver disease, nonalcoholic 01/19/2015     Priority: Low   • Anxiety 12/16/2014     Priority: Low   • Knee pain, right 02/13/2014     Priority: Low   • Neurogenic bladder 04/02/2011     Priority: Low   • Chronic UTI 09/18/2010     Priority: Low   • Multiple personality disorder 09/18/2010     Priority: Low   • Chest pain 03/30/2017   • Weakness of right upper extremity 02/23/2017   • Leg weakness, bilateral 02/22/2017   • Chronic suprapubic catheter (CMS-HCC) 02/16/2017   • Weakness 01/22/2017   • Leg weakness 01/04/2017   • Hypovitaminosis D 11/29/2016   • Bowel and bladder incontinence 10/27/2016   • Galactorrhea 07/22/2016   • Elevated sedimentation rate 06/27/2016   • Weakness of both lower extremities 06/22/2016   • Right flank pain 06/06/2016   • Vitamin D deficiency 05/21/2016   • Morbidly obese (CMS-HCC) 03/07/2016   • Conversion disorder 03/07/2016   • Scoliosis 03/07/2016   • GERD (gastroesophageal reflux disease) 03/07/2016   • Peripheral neuropathy (CMS-HCC) 03/06/2016   • H/O prior ablation treatment 02/10/2016       Allergies:Cefdinir; Depakote; Abilify; Amitriptyline; Amoxicillin; Ciprofloxacin; Clindamycin; Doxycycline; Ees; Flagyl; Flomax; Metformin; Sulfa drugs; Tape; Vancomycin; Cephalexin; Erythromycin; Levofloxacin; Metronidazole; and Valproic acid    Current Outpatient Prescriptions   Medication Sig Dispense Refill   • predniSONE (DELTASONE) 10 MG Tab      • GRALISE 600 MG Tab      •  fluconazole (DIFLUCAN) 150 MG tablet      • cefpodoxime (VANTIN) 200 MG Tab      • oxycodone-acetaminophen (PERCOCET-10)  MG Tab Take  by mouth.     • nitroglycerin (NITROSTAT) 0.4 MG SL Tab Place 1 Tab under tongue as needed for Chest Pain. 25 Tab 1   • oxycodone immediate-release (ROXICODONE) 5 MG Tab Take 1 Tab by mouth every four hours as needed. 30 Tab 0   • ciprofloxacin (CIPRO) 500 MG Tab Take 1 Tab by mouth every 12 hours. 14 Tab 0   • nystatin (MYCOSTATIN) Powder Apply 1 Application to affected area(s) 3 times a day. 1 Bottle 2   • Cranberry 1000 MG Cap Take 1 Cap by mouth 2 times a day, with meals. 60 Cap 3   • gabapentin (NEURONTIN) 600 MG tablet Take 600-1,200 mg by mouth 2 times a day. 600 mg AM   1200 mg PM     • Melatonin 5 MG Tab Take 10 mg by mouth every bedtime.     • ivabradine (CORLANOR) 5 MG Tab tablet Take 5 mg by mouth 2 times a day, with meals.     • atorvastatin (LIPITOR) 40 MG Tab Take 1 Tab by mouth every bedtime. 30 Tab 0   • aspirin EC (ECOTRIN) 81 MG Tablet Delayed Response Take 1 Tab by mouth every day. 30 Tab 0   • ziprasidone (GEODON) 80 MG Cap Take 160 mg by mouth every evening. DO NOT GIVE PAST 1700     • fentanyl (DURAGESIC) 25 MCG/HR PATCH 72 HR Apply 1 Patch to skin as directed every 72 hours.     • lactulose 10 GM/15ML Solution Take 10 g by mouth 2 times a day.     • vitamin D, Ergocalciferol, (DRISDOL) 86593 UNITS Cap capsule Take 50,000 Units by mouth every Friday.     • cyclobenzaprine (FLEXERIL) 10 MG Tab Take 10 mg by mouth 2 Times a Day. Indications: Muscle Spasm     • fluoxetine (PROZAC) 10 MG Cap Take 1 Cap by mouth every day. 30 Cap 1   • promethazine (PHENERGAN) 25 MG Tab Take 1 Tab by mouth every 6 hours as needed for Nausea/Vomiting. 30 Tab 0   • albuterol 108 (90 BASE) MCG/ACT Aero Soln inhalation aerosol Inhale 2 Puffs by mouth every 6 hours as needed for Shortness of Breath.     • cyanocobalamin (HM VITAMIN B12) 500 MCG tablet Take 1,000 mcg by mouth 2  "times a day.     • sodium bicarbonate 325 MG Tab Take 2 Tabs by mouth 3 times a day.     • levothyroxine (SYNTHROID) 75 MCG Tab Take 75 mcg by mouth Every morning on an empty stomach.     • omeprazole (PRILOSEC) 20 MG delayed-release capsule Take 20 mg by mouth 2 times a day.       No current facility-administered medications for this visit.       Social History   Substance Use Topics   • Smoking status: Passive Smoke Exposure - Never Smoker     Types: Cigarettes   • Smokeless tobacco: Never Used   • Alcohol Use: No       Family Status   Relation Status Death Age   • Mother Alive      44 2016   • Maternal Uncle Alive    • Maternal Grandmother Alive    • Father Alive      bio father hx unknown   • Sister Alive      Family History   Problem Relation Age of Onset   • Hypertension Mother    • Sleep Apnea Mother    • Heart Disease Mother    • Other Mother      hypothryod   • Hypertension Maternal Uncle    • Heart Disease Maternal Grandmother    • Hypertension Maternal Grandmother    • Other Sister      Narcolepsy;fibromyalsia;bone;nerve   • Genitourinary () Sister      endometriosis         ROS:  Review of Systems   Constitutional: Positive for fever ( SUBEJCTIVE FEVER) and chills.   Gastrointestinal: Positive for nausea, vomiting and abdominal pain.   Genitourinary: Positive for flank pain.        \"BLADDER PAIN\" \" I HAVE PAIN EVERYWHERE\"          Objective:     Blood pressure 118/80, pulse 95, temperature 36.2 °C (97.2 °F), resp. rate 16, SpO2 96 %.     Physical Exam:  Physical Exam   Constitutional: She is oriented to person, place, and time and well-developed, well-nourished, and in no distress.   HENT:   Head: Normocephalic and atraumatic.   Eyes: Conjunctivae are normal.   Neck: Neck supple.   Abdominal: Soft. She exhibits no distension.   Genitourinary:   Right cva tenderness   Neurological: She is alert and oriented to person, place, and time.   Skin: Skin is warm. No erythema.   SUPRAPUBIC FORRESTER IN PLACE, " SURROUNDING SKIN AREA IS INTACT, NO SIGN OF INFECTION. Urine in jara is not very cloudy, some sediments noted on tube, yellow to orange color, no blood seen   Psychiatric:   Very irritable and impolite. Mother is more reasonable.   Vitals reviewed.        Assessment/Plan:  1. Acute cystitis without hematuria  - we have clamped pt's jara twice and tried to get urine sample but not enough urine collected.  - given her symptoms and recent sepsis secondary to UTI and suprapubic jara, we go ahead and treat with cipro again. Instructed them to take benadryl for rashes or itchiness. Stop and call if worsening rashes. Pt has been reporting allergic to lots of antibiotics. Some of them I don't think it is a real or severe adverse reaction. Mother agrees and would like to go with cipro with benadryl.  - ciprofloxacin (CIPRO) 500 MG Tab; Take 1 Tab by mouth every 12 hours for 14 days.  Dispense: 28 Tab; Refill: 0  - URINALYSIS,CULTURE IF INDICATED; Future    - going to follow up with urologist next week  - pt has hard time getting home health. Explained to them we don't usually do this for same day. She has to follow up with PCP for this but they are concern about the waiting. I will place home health order to resume [facility] for them to change jara as soon as possible and get urine culture. Mother appreciate this. At the same time, we also make another appointment for pt to see PCP for follow up on 4/14. They understand and verbalized will go.     - REFERRAL TO HOME HEALTH      Total time spent was 40 minutes with >50% of time spent on counseling and coordination of care.       Face to Face Supporting Documentation - Home Health    The encounter with this patient was in whole or in part the primary reason for home health admission.    Date of encounter:   Patient:                    MRN:                       YOB: 2018  Kristin Chahalanalisa Balderrama  [id number]  1990     Home health to see  patient for:  Skilled Nursing care for assessment, interventions & education    Skilled need for:  Comment: jara care. Neurologic bladder wtih suprapubic jara    Skilled nursing interventions to include:  Line/Drain/Airway education and care    Homebound status evidenced by:  Needs the assistance of another person in order to leave the home. Leaving home requires a considerable and taxing effort. There is a normal inability to leave the home.    Community Physician to provide follow up care: Torres Brody M.D.     Optional Interventions? No      I certify the face to face encounter for this home health care referral meets the CMS requirements and the encounter/clinical assessment with the patient was, in whole, or in part, for the medical condition(s) listed above, which is the primary reason for home health care. Based on my clinical findings: the service(s) are medically necessary, support the need for home health care, and the homebound criteria are met.  I certify that this patient has had a face to face encounter by myself.  Shanon Vasques A.P.R.N. - NPI: [id number]

## 2017-04-05 ENCOUNTER — HOME CARE VISIT (OUTPATIENT)
Dept: HOME HEALTH SERVICES | Facility: HOME HEALTHCARE | Age: 27
End: 2017-04-05
Payer: MEDICARE

## 2017-04-05 ENCOUNTER — HOSPITAL ENCOUNTER (EMERGENCY)
Facility: MEDICAL CENTER | Age: 27
End: 2017-04-05
Attending: EMERGENCY MEDICINE
Payer: MEDICARE

## 2017-04-05 VITALS
RESPIRATION RATE: 26 BRPM | DIASTOLIC BLOOD PRESSURE: 84 MMHG | TEMPERATURE: 97.4 F | HEIGHT: 63 IN | SYSTOLIC BLOOD PRESSURE: 140 MMHG

## 2017-04-05 VITALS
HEART RATE: 93 BPM | WEIGHT: 260 LBS | SYSTOLIC BLOOD PRESSURE: 145 MMHG | TEMPERATURE: 96.9 F | OXYGEN SATURATION: 96 % | HEIGHT: 62 IN | DIASTOLIC BLOOD PRESSURE: 78 MMHG | RESPIRATION RATE: 16 BRPM | BODY MASS INDEX: 47.84 KG/M2

## 2017-04-05 DIAGNOSIS — R10.9 CHRONIC ABDOMINAL PAIN: ICD-10-CM

## 2017-04-05 DIAGNOSIS — F11.20 NARCOTIC DEPENDENCE (HCC): ICD-10-CM

## 2017-04-05 DIAGNOSIS — G89.29 CHRONIC ABDOMINAL PAIN: ICD-10-CM

## 2017-04-05 DIAGNOSIS — R73.9 HYPERGLYCEMIA: ICD-10-CM

## 2017-04-05 DIAGNOSIS — F41.9 ANXIETY: ICD-10-CM

## 2017-04-05 DIAGNOSIS — R00.0 SINUS TACHYCARDIA: ICD-10-CM

## 2017-04-05 LAB
ALBUMIN SERPL BCP-MCNC: 4.5 G/DL (ref 3.2–4.9)
ALBUMIN/GLOB SERPL: 1.7 G/DL
ALP SERPL-CCNC: 77 U/L (ref 30–99)
ALT SERPL-CCNC: 118 U/L (ref 2–50)
ANION GAP SERPL CALC-SCNC: 16 MMOL/L (ref 0–11.9)
APPEARANCE UR: CLEAR
AST SERPL-CCNC: 88 U/L (ref 12–45)
BASOPHILS # BLD AUTO: 0.9 % (ref 0–1.8)
BASOPHILS # BLD: 0.05 K/UL (ref 0–0.12)
BILIRUB SERPL-MCNC: 0.6 MG/DL (ref 0.1–1.5)
BILIRUB UR QL STRIP.AUTO: NEGATIVE
BUN SERPL-MCNC: 7 MG/DL (ref 8–22)
CALCIUM SERPL-MCNC: 10 MG/DL (ref 8.5–10.5)
CHLORIDE SERPL-SCNC: 100 MMOL/L (ref 96–112)
CO2 SERPL-SCNC: 21 MMOL/L (ref 20–33)
COLOR UR: YELLOW
CREAT SERPL-MCNC: 0.68 MG/DL (ref 0.5–1.4)
CULTURE IF INDICATED INDCX: NO UA CULTURE
EKG IMPRESSION: NORMAL
EOSINOPHIL # BLD AUTO: 0.08 K/UL (ref 0–0.51)
EOSINOPHIL NFR BLD: 1.5 % (ref 0–6.9)
ERYTHROCYTE [DISTWIDTH] IN BLOOD BY AUTOMATED COUNT: 45.7 FL (ref 35.9–50)
GFR SERPL CREATININE-BSD FRML MDRD: >60 ML/MIN/1.73 M 2
GLOBULIN SER CALC-MCNC: 2.6 G/DL (ref 1.9–3.5)
GLUCOSE SERPL-MCNC: 349 MG/DL (ref 65–99)
GLUCOSE UR STRIP.AUTO-MCNC: 500 MG/DL
HCT VFR BLD AUTO: 36.1 % (ref 37–47)
HGB BLD-MCNC: 12.4 G/DL (ref 12–16)
IMM GRANULOCYTES # BLD AUTO: 0.02 K/UL (ref 0–0.11)
IMM GRANULOCYTES NFR BLD AUTO: 0.4 % (ref 0–0.9)
KETONES UR STRIP.AUTO-MCNC: ABNORMAL MG/DL
LEUKOCYTE ESTERASE UR QL STRIP.AUTO: NEGATIVE
LIPASE SERPL-CCNC: 38 U/L (ref 11–82)
LYMPHOCYTES # BLD AUTO: 1.46 K/UL (ref 1–4.8)
LYMPHOCYTES NFR BLD: 27.3 % (ref 22–41)
MCH RBC QN AUTO: 31.2 PG (ref 27–33)
MCHC RBC AUTO-ENTMCNC: 34.3 G/DL (ref 33.6–35)
MCV RBC AUTO: 90.7 FL (ref 81.4–97.8)
MICRO URNS: ABNORMAL
MONOCYTES # BLD AUTO: 0.38 K/UL (ref 0–0.85)
MONOCYTES NFR BLD AUTO: 7.1 % (ref 0–13.4)
NEUTROPHILS # BLD AUTO: 3.36 K/UL (ref 2–7.15)
NEUTROPHILS NFR BLD: 62.8 % (ref 44–72)
NITRITE UR QL STRIP.AUTO: NEGATIVE
NRBC # BLD AUTO: 0 K/UL
NRBC BLD AUTO-RTO: 0 /100 WBC
PH UR STRIP.AUTO: 5 [PH]
PLATELET # BLD AUTO: 143 K/UL (ref 164–446)
PMV BLD AUTO: 12.3 FL (ref 9–12.9)
POTASSIUM SERPL-SCNC: 4 MMOL/L (ref 3.6–5.5)
PROT SERPL-MCNC: 7.1 G/DL (ref 6–8.2)
PROT UR QL STRIP: NEGATIVE MG/DL
RBC # BLD AUTO: 3.98 M/UL (ref 4.2–5.4)
RBC UR QL AUTO: NEGATIVE
SODIUM SERPL-SCNC: 137 MMOL/L (ref 135–145)
SP GR UR STRIP.AUTO: 1.02
WBC # BLD AUTO: 5.4 K/UL (ref 4.8–10.8)

## 2017-04-05 PROCEDURE — 36415 COLL VENOUS BLD VENIPUNCTURE: CPT

## 2017-04-05 PROCEDURE — 665999 HH PPS REVENUE DEBIT

## 2017-04-05 PROCEDURE — 85025 COMPLETE CBC W/AUTO DIFF WBC: CPT

## 2017-04-05 PROCEDURE — 93005 ELECTROCARDIOGRAM TRACING: CPT

## 2017-04-05 PROCEDURE — 81003 URINALYSIS AUTO W/O SCOPE: CPT

## 2017-04-05 PROCEDURE — G0162 HHC RN E&M PLAN SVS, 15 MIN: HCPCS

## 2017-04-05 PROCEDURE — 83690 ASSAY OF LIPASE: CPT

## 2017-04-05 PROCEDURE — 99284 EMERGENCY DEPT VISIT MOD MDM: CPT

## 2017-04-05 PROCEDURE — 80053 COMPREHEN METABOLIC PANEL: CPT

## 2017-04-05 PROCEDURE — A9270 NON-COVERED ITEM OR SERVICE: HCPCS | Performed by: EMERGENCY MEDICINE

## 2017-04-05 PROCEDURE — 700102 HCHG RX REV CODE 250 W/ 637 OVERRIDE(OP): Performed by: EMERGENCY MEDICINE

## 2017-04-05 PROCEDURE — 665998 HH PPS REVENUE CREDIT

## 2017-04-05 RX ORDER — PROCHLORPERAZINE MALEATE 10 MG
10 TABLET ORAL ONCE
Status: COMPLETED | OUTPATIENT
Start: 2017-04-05 | End: 2017-04-05

## 2017-04-05 RX ORDER — OXYCODONE AND ACETAMINOPHEN 10; 325 MG/1; MG/1
1 TABLET ORAL ONCE
Status: COMPLETED | OUTPATIENT
Start: 2017-04-05 | End: 2017-04-05

## 2017-04-05 RX ORDER — FLUOXETINE 10 MG/1
CAPSULE ORAL
Qty: 30 CAP | Refills: 2 | Status: SHIPPED | OUTPATIENT
Start: 2017-04-05 | End: 2017-08-05 | Stop reason: SDUPTHER

## 2017-04-05 RX ADMIN — OXYCODONE HYDROCHLORIDE AND ACETAMINOPHEN 1 TABLET: 10; 325 TABLET ORAL at 10:18

## 2017-04-05 RX ADMIN — PROCHLORPERAZINE MALEATE 10 MG: 10 TABLET, FILM COATED ORAL at 10:36

## 2017-04-05 SDOH — ECONOMIC STABILITY: HOUSING INSECURITY: UNSAFE APPLIANCES: 0

## 2017-04-05 SDOH — ECONOMIC STABILITY: HOUSING INSECURITY: UNSAFE COOKING RANGE AREA: 0

## 2017-04-05 ASSESSMENT — ENCOUNTER SYMPTOMS
NAUSEA: YES
VOMITING: OCCASIONALLY
DEPRESSED MOOD: 1

## 2017-04-05 ASSESSMENT — PAIN SCALES - GENERAL
PAINLEVEL_OUTOF10: 7
PAINLEVEL_OUTOF10: 10

## 2017-04-05 ASSESSMENT — PATIENT HEALTH QUESTIONNAIRE - PHQ9
2. FEELING DOWN, DEPRESSED, IRRITABLE, OR HOPELESS: 00
1. LITTLE INTEREST OR PLEASURE IN DOING THINGS: 00

## 2017-04-05 ASSESSMENT — ACTIVITIES OF DAILY LIVING (ADL)
OASIS_M1830: 03
HOME_HEALTH_OASIS: 01

## 2017-04-05 NOTE — ED AVS SNAPSHOT
4/5/2017          Kristin Balderrama  [address] [place] [address]    Dear Kristin:    [facility] wants to ensure your discharge home is safe and you or your loved ones have had all your questions answered regarding your care after you leave the hospital.    You may receive a telephone call within two days of your discharge.  This call is to make certain you understand your discharge instructions as well as ensure we provided you with the best care possible during your stay with us.     The call will only last approximately 3-5 minutes and will be done by a nurse.    Once again, we want to ensure your discharge home is safe and that you have a clear understanding of any next steps in your care.  If you have any questions or concerns, please do not hesitate to contact us, we are here for you.  Thank you for choosing [facility] for your healthcare needs.    Sincerely,    Lorenzo Zuleta    [facility]

## 2017-04-05 NOTE — ED NOTES
.  Chief Complaint   Patient presents with   • Abdominal Pain     lower abd and bladder   • Flank Pain     right side   • N/V   • Chest Pain     x2 days constant left chest radiates left arm.   to triage by w/c. Pt has multiple c/c. Including recent hospitalization for sepsis. Pt has suprapubic catheter possible infection. Started on abx yesterday by LNP. Pt reports no improvement.   EKG complete prior to triage

## 2017-04-05 NOTE — ED NOTES
Chief Complaint   Patient presents with   • Abdominal Pain     lower abd and bladder   • Flank Pain     right side   • N/V   • Chest Pain     x2 days constant left chest radiates left arm.     Agree with triage RN, chart up for ERP.

## 2017-04-05 NOTE — ED PROVIDER NOTES
ED Provider Note    CHIEF COMPLAINT  Chief Complaint   Patient presents with   • Abdominal Pain     lower abd and bladder   • Flank Pain     right side   • N/V   • Chest Pain     x2 days constant left chest radiates left arm.       HPI  Kristin Balderrama is a 26 y.o. female who presents anxious, stating she is concerned she has sepsis.  Patient has extensive psychiatric history however was admitted recently for urosepsis.  She completed Cipro at home.  She is also recently been treated with Ceftin ear.  Today complains of abdominal pain suprapubic and left flank, also chest discomfort in the past 2 days.  Patient has had extensive workup for both complaints in the past, these appear to be areas of chronic pain.  Triage note notes right flank pain, the patient stated to myself that was left flank.  No fever.  Nausea without vomiting.  She denies diarrhea.  States she saw the nurse practitioner for a cardiologist who has scheduled her a stress test for tomorrow.  Onset of chest pain was well at rest, it has been constant over the past few days.  Patient self catheterizes due to history of bladder problems.  Review of chart shows multiple neurologic workups with some question of conversion disorder rather than medical cause of lower extremity weakness.    REVIEW OF SYSTEMS    Constitutional: No fever  Respiratory: No cough  Cardiac: No syncope.  No exertional chest pain  Gastrointestinal: Acute on chronic abdominal pain  Musculoskeletal: Left flank pain  Neurologic: No headache.  Chronic weakness of the lower extremities  Psychiatric: Multiple personality disorder, history of psychosis       All other systems are negative.       PAST MEDICAL HISTORY  Past Medical History   Diagnosis Date   • Scoliosis    • Multiple personality disorder    • Chronic UTI 9/18/2010   • Heart burn    • Pain 08-15-12     back, 7/10   • History of falling    • Sinus tachycardia 10/31/2013   • Urinary incontinence    • Hypertension    •  "Disorder of thyroid    • Obesity    • Pneumonia 2012   • ASTHMA      when around smoke   • Breath shortness      with tachycardia   • Anginal syndrome      random chest pain especially with tachycardia   • Psychosis    • Arthritis      osteo   • PCOS (polycystic ovarian syndrome)    • Gynecological disorder      PCOS   • Renal disorder      \"kidney disease, stage 1\" nephrologist, Dr. Vallejo   • Arrhythmia      \"sinus tachycardia\", cariologist, Dr. 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   • Sleep apnea      CPAP \"pulmonary doctor took me off mid year 2016\"   • 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CMS-HCC)    • Fall        FAMILY HISTORY  Family History   Problem Relation Age of Onset   • Hypertension Mother    • Sleep Apnea Mother    • Heart Disease Mother    • Other Mother      hypothryod   • Hypertension Maternal Uncle    • Heart Disease Maternal Grandmother    • Hypertension Maternal Grandmother    • Other Sister      Narcolepsy;fibromyalsia;bone;nerve   • Genitourinary () Sister      endometriosis       SOCIAL HISTORY  Social History     Social History   • Marital Status: Single     Spouse Name: N/A   • Number of Children: N/A   • Years of Education: N/A     Social History Main Topics   • Smoking status: Passive Smoke Exposure - Never Smoker     Types: Cigarettes   • Smokeless tobacco: Never Used   • Alcohol Use: No   • Drug Use: No   • Sexual Activity: Not Currently     Birth Control/ Protection: Pill     Other Topics Concern   • None     Social History Narrative    ** Merged History Encounter **            SURGICAL HISTORY  Past Surgical History   Procedure Laterality Date   • Neuro dest facet l/s w/ig sngl  4/21/2015     Performed by Reza Tabor at [facility]   • Recovery  1/27/2016     Procedure: CATH LAB EP STUDY WITH SINUS NODE MODIFICATION ABHINAV;  Surgeon: Recoveryon Surgery;  Location: SURGERY PRE-POST " "PROC UNIT [facility];  Service:    • Katie by laparoscopy  8/29/2010     Performed by SHAYY JOHNS at SUR[facility]   • Lumbar fusion anterior  8/21/2012     Performed by SUSIE SAWANT at SURGERY [facility]   • Other cardiac surgery  1/2016     cardiac ablation   • Tonsillectomy       tonsillectomy   • Bowel stimulator insertion  7/13/2016     Procedure: BOWEL STIMULATOR INSERTION FOR PERMANENT INTERSTIM SACRAL IMPLANT;  Surgeon: Joe Noyola M.D.;  Location: SURGERY [facility];  Service:    • Gastroscopy with balloon dilatation N/A 1/4/2017     Procedure: GASTROSCOPY WITH DILATATION;  Surgeon: Torres Vargas M.D.;  Location: SURGERY [facility];  Service:    • Muscle biopsy Right 1/26/2017     Procedure: MUSCLE BIOPSY - THIGH;  Surgeon: Isidro Vigil M.D.;  Location: SURGERY [facility];  Service:    • Other abdominal surgery         CURRENT MEDICATIONS  Home Medications     **Home medications have not yet been reviewed for this encounter**          ALLERGIES  Allergies   Allergen Reactions   • Cefdinir Shortness of Breath and Itching     Tolerated 1/18/17   • Depakote [Divalproex Sodium] Unspecified     Muscle spasms/muscle pain and weakness     • Abilify Unspecified     Headaches/muscle twitching     • Amitriptyline Unspecified     Headaches     • Amoxicillin Rash     Pt states \"I get a rash\".     • Ciprofloxacin Rash     Pt states \"I get a rash\".     • Clindamycin Nausea     Even with food     • Doxycycline Vomiting and Nausea     RXN=unknown   • Ees [Erythromycin] Vomiting and Nausea   • Flagyl [Metronidazole Hcl] Unspecified     \"eye problems\"     • Flomax [Tamsulosin Hydrochloride] Swelling   • Metformin Unspecified     Increased lactic acid      • Sulfa Drugs Hives and Rash     RXN=since childhood   • Tape Rash     Tears skin off   coban with Tegaderm tape ok  RXN=ongoing   • Vancomycin Itching     Pt becomes flushed in face and chest.   RXN=7/10/16   • Cephalexin [Keflex] Rash " "    Pt states she gets a rash with this medication   • Erythromycin    • Levofloxacin Unspecified     Leg muscle cramps   • Metronidazole    • Valproic Acid        PHYSICAL EXAM  VITAL SIGNS: /78 mmHg  Pulse 93  Temp(Src) 36.1 °C (96.9 °F)  Resp 16  Ht 1.575 m (5' 2\")  Wt 117.935 kg (260 lb)  BMI 47.54 kg/m2  SpO2 96%  Constitutional:  Non-toxic appearance.   HENT: No facial swelling  Eyes: Anicteric, no conjunctivitis.     Cardiovascular: Normal heart rate, Normal rhythm  Pulmonary:  No wheezing, No rales.   Gastrointestinal: Soft, suprapubic tenderness, No masses.  Bowel sounds normal  Skin: Warm, Dry, No cyanosis.   Neurologic: Speech is clear, follows commands, facial expression is symmetrical.  Bilateral Leg weakness.  Patient states they're currently \"doing well\".  Psychiatric: Anxious, Affect flat,  Mood normal.   Musculoskeletal: Mild left flank tenderness.    EKG/Labs  Results for orders placed or performed during the hospital encounter of 04/05/17   CBC WITH DIFFERENTIAL   Result Value Ref Range    WBC 5.4 4.8 - 10.8 K/uL    RBC 3.98 (L) 4.20 - 5.40 M/uL    Hemoglobin 12.4 12.0 - 16.0 g/dL    Hematocrit 36.1 (L) 37.0 - 47.0 %    MCV 90.7 81.4 - 97.8 fL    MCH 31.2 27.0 - 33.0 pg    MCHC 34.3 33.6 - 35.0 g/dL    RDW 45.7 35.9 - 50.0 fL    Platelet Count 143 (L) 164 - 446 K/uL    MPV 12.3 9.0 - 12.9 fL    Neutrophils-Polys 62.80 44.00 - 72.00 %    Lymphocytes 27.30 22.00 - 41.00 %    Monocytes 7.10 0.00 - 13.40 %    Eosinophils 1.50 0.00 - 6.90 %    Basophils 0.90 0.00 - 1.80 %    Immature Granulocytes 0.40 0.00 - 0.90 %    Nucleated RBC 0.00 /100 WBC    Neutrophils (Absolute) 3.36 2.00 - 7.15 K/uL    Lymphs (Absolute) 1.46 1.00 - 4.80 K/uL    Monos (Absolute) 0.38 0.00 - 0.85 K/uL    Eos (Absolute) 0.08 0.00 - 0.51 K/uL    Baso (Absolute) 0.05 0.00 - 0.12 K/uL    Immature Granulocytes (abs) 0.02 0.00 - 0.11 K/uL    NRBC (Absolute) 0.00 K/uL   COMP METABOLIC PANEL   Result Value Ref Range    " Sodium 137 135 - 145 mmol/L    Potassium 4.0 3.6 - 5.5 mmol/L    Chloride 100 96 - 112 mmol/L    Co2 21 20 - 33 mmol/L    Anion Gap 16.0 (H) 0.0 - 11.9    Glucose 349 (H) 65 - 99 mg/dL    Bun 7 (L) 8 - 22 mg/dL    Creatinine 0.68 0.50 - 1.40 mg/dL    Calcium 10.0 8.5 - 10.5 mg/dL    AST(SGOT) 88 (H) 12 - 45 U/L    ALT(SGPT) 118 (H) 2 - 50 U/L    Alkaline Phosphatase 77 30 - 99 U/L    Total Bilirubin 0.6 0.1 - 1.5 mg/dL    Albumin 4.5 3.2 - 4.9 g/dL    Total Protein 7.1 6.0 - 8.2 g/dL    Globulin 2.6 1.9 - 3.5 g/dL    A-G Ratio 1.7 g/dL   LIPASE   Result Value Ref Range    Lipase 38 11 - 82 U/L   URINALYSIS,CULTURE IF INDICATED   Result Value Ref Range    Micro Urine Req see below     Color Yellow     Character Clear     Specific Gravity 1.020 <1.035    Ph 5.0 5.0-8.0    Glucose 500 (A) Negative mg/dL    Ketones Trace (A) Negative mg/dL    Protein Negative Negative mg/dL    Bilirubin Negative Negative    Nitrite Negative Negative    Leukocyte Esterase Negative Negative    Occult Blood Negative Negative    Culture Indicated No UA Culture   ESTIMATED GFR   Result Value Ref Range    GFR If African American >60 >60 mL/min/1.73 m 2    GFR If Non African American >60 >60 mL/min/1.73 m 2   EKG (NOW)   Result Value Ref Range    Report       [facility] Emergency Dept.    Test Date:  2017  Pt Name:    HARLEY DE LA CRUZ              Department: ER  MRN:        [id number]                      Room:  Gender:     F                            Technician: [id number]  :        1990                   Requested By:ER TRIAGE PROTOCOL  Order #:    [id number]                    Reading MD: LOC BENDER MD    Measurements  Intervals                                Axis  Rate:       98                           P:          29  RI:         132                          QRS:        22  QRSD:       94                           T:          -12  QT:         368  QTc:        470    Interpretive Statements  SINUS  "RHYTHM  BORDERLINE REPOL ABNORMALITY, DIFFUSE LEADS  BASELINE WANDER IN LEAD(S) V5  Compared to ECG 03/30/2017 15:37:16  T-wave abnormality no longer present    Electronically Signed On 4-5-2017 14:49:30 PDT by LARON BENDER MD       *Note: Due to a large number of results and/or encounters for the requested time period, some results have not been displayed. A complete set of results can be found in Results Review.           COURSE & MEDICAL DECISION MAKING  Pertinent Labs & Imaging studies reviewed. (See chart for details)  Patient requested 2 tablets of Percocet \"my usual dose\", 10 mg strength, however review of her chart  shows one tablet 4 times a day.  She was able to tolerate this in addition was given oral tablet of Compazine.  Patient reassured, no evidence of sepsis at this time.  Urinalysis negative, white blood cell count normal, no fever.  Patient appears to have exacerbation of chronic pain.  Patient has been scheduled to see her primary doctor tomorrow.  She was visibly disappointed when told she would not receive injection of Dilaudid or other IV narcotic.  Behavior concerning for drug-seeking.  Patient looked well upon discharge.    FINAL IMPRESSION     1. Chronic abdominal pain    2. Anxiety    3. Narcotic dependence (CMS-HCC)    4. Hyperglycemia                    Electronically signed by: Laron Bender, 4/5/2017 2:49 PM    "

## 2017-04-05 NOTE — ED NOTES
"PIV established, blood drawn and sent to lab.   Urine sent to lab.   Pt requesting \"pain and nausea medications.\"  "

## 2017-04-05 NOTE — DISCHARGE INSTRUCTIONS
Abdominal Pain (Nonspecific)  Your exam might not show the exact reason you have abdominal pain. Since there are many different causes of abdominal pain, another checkup and more tests may be needed. It is very important to follow up for lasting (persistent) or worsening symptoms. A possible cause of abdominal pain in any person who still has his or her appendix is acute appendicitis. Appendicitis is often hard to diagnose. Normal blood tests, urine tests, ultrasound, and CT scans do not completely rule out early appendicitis or other causes of abdominal pain. Sometimes, only the changes that happen over time will allow appendicitis and other causes of abdominal pain to be determined. Other potential problems that may require surgery may also take time to become more apparent. Because of this, it is important that you follow all of the instructions below.  HOME CARE INSTRUCTIONS   · Rest as much as possible.   · Do not eat solid food until your pain is gone.   · While adults or children have pain: A diet of water, weak decaffeinated tea, broth or bouillon, gelatin, oral rehydration solutions (ORS), frozen ice pops, or ice chips may be helpful.   · When pain is gone in adults or children: Start a light diet (dry toast, crackers, applesauce, or white rice). Increase the diet slowly as long as it does not bother you. Eat no dairy products (including cheese and eggs) and no spicy, fatty, fried, or high-fiber foods.   · Use no alcohol, caffeine, or cigarettes.   · Take your regular medicines unless your caregiver told you not to.   · Take any prescribed medicine as directed.   · Only take over-the-counter or prescription medicines for pain, discomfort, or fever as directed by your caregiver. Do not give aspirin to children.   If your caregiver has given you a follow-up appointment, it is very important to keep that appointment. Not keeping the appointment could result in a permanent injury and/or lasting (chronic) pain  and/or disability. If there is any problem keeping the appointment, you must call to reschedule.   SEEK IMMEDIATE MEDICAL CARE IF:   · Your pain is not gone in 24 hours.   · Your pain becomes worse, changes location, or feels different.   · You or your child has an oral temperature above 102° F (38.9° C), not controlled by medicine.   · Your baby is older than 3 months with a rectal temperature of 102° F (38.9° C) or higher.   · Your baby is 3 months old or younger with a rectal temperature of 100.4° F (38° C) or higher.   · You have shaking chills.   · You keep throwing up (vomiting) or cannot drink liquids.   · There is blood in your vomit or you see blood in your bowel movements.   · Your bowel movements become dark or black.   · You have frequent bowel movements.   · Your bowel movements stop (become blocked) or you cannot pass gas.   · You have bloody, frequent, or painful urination.   · You have yellow discoloration in the skin or whites of the eyes.   · Your stomach becomes bloated or bigger.   · You have dizziness or fainting.   · You have chest or back pain.   MAKE SURE YOU:   · Understand these instructions.   · Will watch your condition.   · Will get help right away if you are not doing well or get worse.   Document Released: 12/18/2006 Document Revised: 03/11/2013 Document Reviewed: 11/15/2010  [employer]® Patient Information ©2013 [employer].  Chronic Pain  Chronic pain can be defined as pain that is off and on and lasts for 3-6 months or longer. Many things cause chronic pain, which can make it difficult to make a diagnosis. There are many treatment options available for chronic pain. However, finding a treatment that works well for you may require trying various approaches until the right one is found. Many people benefit from a combination of two or more types of treatment to control their pain.  SYMPTOMS   Chronic pain can occur anywhere in the body and can range from mild to very severe. Some  types of chronic pain include:  · Headache.  · Low back pain.  · Cancer pain.  · Arthritis pain.  · Neurogenic pain. This is pain resulting from damage to nerves.   People with chronic pain may also have other symptoms such as:  · Depression.  · Anger.  · Insomnia.  · Anxiety.  DIAGNOSIS   Your health care provider will help diagnose your condition over time. In many cases, the initial focus will be on excluding possible conditions that could be causing the pain. Depending on your symptoms, your health care provider may order tests to diagnose your condition. Some of these tests may include:   · Blood tests.    · CT scan.    · MRI.    · X-rays.    · Ultrasounds.    · Nerve conduction studies.    You may need to see a specialist.   TREATMENT   Finding treatment that works well may take time. You may be referred to a pain specialist. He or she may prescribe medicine or therapies, such as:   · Mindful meditation or yoga.  · Shots (injections) of numbing or pain-relieving medicines into the spine or area of pain.  · Local electrical stimulation.  · Acupuncture.    · Massage therapy.    · Aroma, color, light, or sound therapy.    · Biofeedback.    · Working with a physical therapist to keep from getting stiff.    · Regular, gentle exercise.    · Cognitive or behavioral therapy.    · Group support.    Sometimes, surgery may be recommended.   HOME CARE INSTRUCTIONS   · Take all medicines as directed by your health care provider.    · Lessen stress in your life by relaxing and doing things such as listening to calming music.    · Exercise or be active as directed by your health care provider.    · Eat a healthy diet and include things such as vegetables, fruits, fish, and lean meats in your diet.    · Keep all follow-up appointments with your health care provider.    · Attend a support group with others suffering from chronic pain.  SEEK MEDICAL CARE IF:   · Your pain gets worse.    · You develop a new pain that was not  there before.    · You cannot tolerate medicines given to you by your health care provider.    · You have new symptoms since your last visit with your health care provider.    SEEK IMMEDIATE MEDICAL CARE IF:   · You feel weak.    · You have decreased sensation or numbness.    · You lose control of bowel or bladder function.    · Your pain suddenly gets much worse.    · You develop shaking.  · You develop chills.  · You develop confusion.  · You develop chest pain.  · You develop shortness of breath.    MAKE SURE YOU:  · Understand these instructions.  · Will watch your condition.  · Will get help right away if you are not doing well or get worse.     This information is not intended to replace advice given to you by your health care provider. Make sure you discuss any questions you have with your health care provider.     Document Released: 09/09/2003 Document Revised: 08/20/2014 Document Reviewed: 06/13/2014  [employer] Patient Education ©2016 [employer]

## 2017-04-05 NOTE — ED AVS SNAPSHOT
Home Care Instructions                                                                                                                Kristin Balderrama   MRN: [id number]    Department:  [facility], Emergency Dept   Date of Visit:  4/5/2017            [facility], Emergency Dept    [address]    [facility] [address]    Phone:  [phone]      You were seen by     Laron Dean M.D.      Your Diagnosis Was     Chronic abdominal pain     R10.9, G89.29       These are the medications you received during your hospitalization from 04/05/2017 0816 to 04/05/2017 1050     Date/Time Order Dose Route Action    04/05/2017 1036 prochlorperazine (COMPAZINE) tablet 10 mg 10 mg Oral Given    04/05/2017 1018 oxycodone-acetaminophen (PERCOCET-10)  MG per tablet 1 Tab 1 Tab Oral Given      Follow-up Information     1. Schedule an appointment as soon as possible for a visit with Torres Brody M.D..    Specialty:  Internal Medicine    Why:  see your doctor for recheck.  Return for any concerns    Contact information    [address]  [facility] [[email or web address] [address]  [facility] [address] [phone]        Medication Information     Review all of your home medications and newly ordered medications with your primary doctor and/or pharmacist as soon as possible. Follow medication instructions as directed by your doctor and/or pharmacist.     Please keep your complete medication list with you and share with your physician. Update the information when medications are discontinued, doses are changed, or new medications (including over-the-counter products) are added; and carry medication information at all times in the event of emergency situations.               Medication List      ASK your doctor about these medications        Instructions    Morning Afternoon Evening Bedtime    albuterol 108 (90 BASE) MCG/ACT Aers inhalation aerosol        Inhale 2 Puffs by mouth every 6 hours as needed for Shortness of  Breath.   Dose:  2 Puff                        aspirin EC 81 MG Tbec   Commonly known as:  ECOTRIN        Take 1 Tab by mouth every day.   Dose:  81 mg                        atorvastatin 40 MG Tabs   Commonly known as:  LIPITOR        Take 1 Tab by mouth every bedtime.   Dose:  40 mg                        cefpodoxime 200 MG Tabs   Commonly known as:  VANTIN                             ciprofloxacin 500 MG Tabs   Commonly known as:  CIPRO        Take 1 Tab by mouth every 12 hours for 14 days.   Dose:  500 mg                        CORLANOR 5 MG Tabs tablet   Generic drug:  ivabradine        Take 5 mg by mouth 2 times a day, with meals.   Dose:  5 mg                        Cranberry 1000 MG Caps        Take 1 Cap by mouth 2 times a day, with meals.   Dose:  1 Cap                        cyclobenzaprine 10 MG Tabs   Commonly known as:  FLEXERIL        Take 10 mg by mouth 2 Times a Day. Indications: Muscle Spasm   Dose:  10 mg                        fentanyl 25 MCG/HR Pt72   Commonly known as:  DURAGESIC        Apply 1 Patch to skin as directed every 72 hours.   Dose:  1 Patch                        fluconazole 150 MG tablet   Commonly known as:  DIFLUCAN                             fluoxetine 10 MG Caps   Commonly known as:  PROZAC        Take 1 Cap by mouth every day.   Dose:  10 mg                        gabapentin 600 MG tablet   Commonly known as:  NEURONTIN        Take 600-1,200 mg by mouth 2 times a day. 600 mg AM  1200 mg PM   Dose:  600-1200 mg                        GRALISE 600 MG Tabs   Generic drug:  Gabapentin (Once-Daily)                             HM VITAMIN B12 500 MCG tablet   Generic drug:  cyanocobalamin        Take 1,000 mcg by mouth 2 times a day.   Dose:  1000 mcg                        lactulose 10 GM/15ML Soln        Take 10 g by mouth 2 times a day.   Dose:  10 g                        levothyroxine 75 MCG Tabs   Commonly known as:  SYNTHROID        Take 75 mcg by mouth Every morning on an  empty stomach.   Dose:  75 mcg                        Melatonin 5 MG Tabs        Doctor's comments:  Takes two tabs at Bedtime (10 mg.)   Take 10 mg by mouth every bedtime.   Dose:  10 mg                        nitroglycerin 0.4 MG Subl   Commonly known as:  NITROSTAT        Place 1 Tab under tongue as needed for Chest Pain.   Dose:  0.4 mg                        nystatin Powd   Commonly known as:  MYCOSTATIN        Apply 1 Application to affected area(s) 3 times a day.   Dose:  1 Application                        omeprazole 20 MG delayed-release capsule   Commonly known as:  PRILOSEC        Take 20 mg by mouth 2 times a day.   Dose:  20 mg                        oxycodone immediate-release 5 MG Tabs   Commonly known as:  ROXICODONE        Take 1 Tab by mouth every four hours as needed.   Dose:  5 mg                        oxycodone-acetaminophen  MG Tabs   Last time this was given:  1 Tab on 4/5/2017 10:18 AM   Commonly known as:  PERCOCET-10        Take  by mouth.                        predniSONE 10 MG Tabs   Commonly known as:  DELTASONE                             promethazine 25 MG Tabs   Commonly known as:  PHENERGAN        Take 1 Tab by mouth every 6 hours as needed for Nausea/Vomiting.   Dose:  25 mg                        sodium bicarbonate 325 MG Tabs        Take 2 Tabs by mouth 3 times a day.   Dose:  650 mg                        vitamin D (Ergocalciferol) 20144 UNITS Caps capsule   Commonly known as:  DRISDOL        Take 50,000 Units by mouth every Friday.   Dose:  75296 Units                        ziprasidone 80 MG Caps   Commonly known as:  GEODON        Take 160 mg by mouth every evening. DO NOT GIVE PAST 1700   Dose:  160 mg                                Procedures and tests performed during your visit     CBC WITH DIFFERENTIAL    COMP METABOLIC PANEL    EKG (NOW)    ESTIMATED GFR    LIPASE    OLD EKG    SALINE LOCK    URINALYSIS,CULTURE IF INDICATED        Discharge Instructions          Abdominal Pain (Nonspecific)  Your exam might not show the exact reason you have abdominal pain. Since there are many different causes of abdominal pain, another checkup and more tests may be needed. It is very important to follow up for lasting (persistent) or worsening symptoms. A possible cause of abdominal pain in any person who still has his or her appendix is acute appendicitis. Appendicitis is often hard to diagnose. Normal blood tests, urine tests, ultrasound, and CT scans do not completely rule out early appendicitis or other causes of abdominal pain. Sometimes, only the changes that happen over time will allow appendicitis and other causes of abdominal pain to be determined. Other potential problems that may require surgery may also take time to become more apparent. Because of this, it is important that you follow all of the instructions below.  HOME CARE INSTRUCTIONS   · Rest as much as possible.   · Do not eat solid food until your pain is gone.   · While adults or children have pain: A diet of water, weak decaffeinated tea, broth or bouillon, gelatin, oral rehydration solutions (ORS), frozen ice pops, or ice chips may be helpful.   · When pain is gone in adults or children: Start a light diet (dry toast, crackers, applesauce, or white rice). Increase the diet slowly as long as it does not bother you. Eat no dairy products (including cheese and eggs) and no spicy, fatty, fried, or high-fiber foods.   · Use no alcohol, caffeine, or cigarettes.   · Take your regular medicines unless your caregiver told you not to.   · Take any prescribed medicine as directed.   · Only take over-the-counter or prescription medicines for pain, discomfort, or fever as directed by your caregiver. Do not give aspirin to children.   If your caregiver has given you a follow-up appointment, it is very important to keep that appointment. Not keeping the appointment could result in a permanent injury and/or lasting (chronic) pain  and/or disability. If there is any problem keeping the appointment, you must call to reschedule.   SEEK IMMEDIATE MEDICAL CARE IF:   · Your pain is not gone in 24 hours.   · Your pain becomes worse, changes location, or feels different.   · You or your child has an oral temperature above 102° F (38.9° C), not controlled by medicine.   · Your baby is older than 3 months with a rectal temperature of 102° F (38.9° C) or higher.   · Your baby is 3 months old or younger with a rectal temperature of 100.4° F (38° C) or higher.   · You have shaking chills.   · You keep throwing up (vomiting) or cannot drink liquids.   · There is blood in your vomit or you see blood in your bowel movements.   · Your bowel movements become dark or black.   · You have frequent bowel movements.   · Your bowel movements stop (become blocked) or you cannot pass gas.   · You have bloody, frequent, or painful urination.   · You have yellow discoloration in the skin or whites of the eyes.   · Your stomach becomes bloated or bigger.   · You have dizziness or fainting.   · You have chest or back pain.   MAKE SURE YOU:   · Understand these instructions.   · Will watch your condition.   · Will get help right away if you are not doing well or get worse.   Document Released: 12/18/2006 Document Revised: 03/11/2013 Document Reviewed: 11/15/2010  ExitCare® Patient Information ©2013 [employer].  Chronic Pain  Chronic pain can be defined as pain that is off and on and lasts for 3-6 months or longer. Many things cause chronic pain, which can make it difficult to make a diagnosis. There are many treatment options available for chronic pain. However, finding a treatment that works well for you may require trying various approaches until the right one is found. Many people benefit from a combination of two or more types of treatment to control their pain.  SYMPTOMS   Chronic pain can occur anywhere in the body and can range from mild to very severe. Some  types of chronic pain include:  · Headache.  · Low back pain.  · Cancer pain.  · Arthritis pain.  · Neurogenic pain. This is pain resulting from damage to nerves.   People with chronic pain may also have other symptoms such as:  · Depression.  · Anger.  · Insomnia.  · Anxiety.  DIAGNOSIS   Your health care provider will help diagnose your condition over time. In many cases, the initial focus will be on excluding possible conditions that could be causing the pain. Depending on your symptoms, your health care provider may order tests to diagnose your condition. Some of these tests may include:   · Blood tests.    · CT scan.    · MRI.    · X-rays.    · Ultrasounds.    · Nerve conduction studies.    You may need to see a specialist.   TREATMENT   Finding treatment that works well may take time. You may be referred to a pain specialist. He or she may prescribe medicine or therapies, such as:   · Mindful meditation or yoga.  · Shots (injections) of numbing or pain-relieving medicines into the spine or area of pain.  · Local electrical stimulation.  · Acupuncture.    · Massage therapy.    · Aroma, color, light, or sound therapy.    · Biofeedback.    · Working with a physical therapist to keep from getting stiff.    · Regular, gentle exercise.    · Cognitive or behavioral therapy.    · Group support.    Sometimes, surgery may be recommended.   HOME CARE INSTRUCTIONS   · Take all medicines as directed by your health care provider.    · Lessen stress in your life by relaxing and doing things such as listening to calming music.    · Exercise or be active as directed by your health care provider.    · Eat a healthy diet and include things such as vegetables, fruits, fish, and lean meats in your diet.    · Keep all follow-up appointments with your health care provider.    · Attend a support group with others suffering from chronic pain.  SEEK MEDICAL CARE IF:   · Your pain gets worse.    · You develop a new pain that was not  there before.    · You cannot tolerate medicines given to you by your health care provider.    · You have new symptoms since your last visit with your health care provider.    SEEK IMMEDIATE MEDICAL CARE IF:   · You feel weak.    · You have decreased sensation or numbness.    · You lose control of bowel or bladder function.    · Your pain suddenly gets much worse.    · You develop shaking.  · You develop chills.  · You develop confusion.  · You develop chest pain.  · You develop shortness of breath.    MAKE SURE YOU:  · Understand these instructions.  · Will watch your condition.  · Will get help right away if you are not doing well or get worse.     This information is not intended to replace advice given to you by your health care provider. Make sure you discuss any questions you have with your health care provider.     Document Released: 09/09/2003 Document Revised: 08/20/2014 Document Reviewed: 06/13/2014  [employer] Interactive Patient Education ©2016 [employer]            Patient Information     Patient Information    Following emergency treatment: all patient requiring follow-up care must return either to a private physician or a clinic if your condition worsens before you are able to obtain further medical attention, please return to the emergency room.     Billing Information    At [facility], we work to make the billing process streamlined for our patients.  Our Representatives are here to answer any questions you may have regarding your hospital bill.  If you have insurance coverage and have supplied your insurance information to us, we will submit a claim to your insurer on your behalf.  Should you have any questions regarding your bill, we can be reached online or by phone as follows:  Online: You are able pay your bills online or live chat with our representatives about any billing questions you may have. We are here to help Monday - Friday from 8:00am to 7:30pm and 9:00am - 12:00pm on Saturdays.   Please visit [email or web address]  for more information.   Phone:  [phone] or [phone]    Please note that your emergency physician, surgeon, pathologist, radiologist, anesthesiologist, and other specialists are not employed by [facility] and will therefore bill separately for their services.  Please contact them directly for any questions concerning their bills at the numbers below:     Emergency Physician Services:  [phone]  [facility]:  [phone]  [facility]:  [phone]  [facility]:  [phone]    1. Your final bill may vary from the amount quoted upon discharge if all procedures are not complete at that time, or if your doctor has additional procedures of which we are not aware. You will receive an additional bill if you return to the Emergency Department at [facility] for suture removal regardless of the facility of which the sutures were placed.     2. Please arrange for settlement of this account at the emergency registration.    3. All self-pay accounts are due in full at the time of treatment.  If you are unable to meet this obligation then payment is expected within 4-5 days.     4. If you have had radiology studies (CT, X-ray, Ultrasound, MRI), you have received a preliminary result during your emergency department visit. Please contact the radiology department [phone] to receive a copy of your final result. Please discuss the Final result with your primary physician or with the follow up physician provided.     Crisis Hotline:  [place] Crisis Hotline:  4-428-FVPVEZQ or 1-369.192.7815  [place] Crisis Hotline:    1-116.188.1932 or 807-946-9687         ED Discharge Follow Up Questions    1. In order to provide you with very good care, we would like to follow up with a phone call in the next few days.  May we have your permission to contact you?     YES /  NO    2. What is the best phone number  to call you? (       )_____-__________    3. What is the best time to call you?      Morning  /  Afternoon  /  Evening                   Patient Signature:  ____________________________________________________________    Date:  ____________________________________________________________      Your appointments     Apr 06, 2017  9:30 AM   NM CARDIAC STRESS TEST (30) with [facility] NM DSPECT 2   [facility] ([facility])    [address] [id number] [phone]           NPO for 4 hours prior to scan.  No caffeine for 24 hours prior to test (decaf, coffee, cola, tea, chocolate, Excedrin, and Anacin).  No Viagra or other ED meds for 48 hours.  Warn patient of length of test and to bring list of meds.            Apr 11, 2017  2:40 PM   Follow Up Visit with Sandoval Fox M.D.   [facility] Neurology (--)    [address] [facility] [address] [facility], [address]  [facility] [address] [phone]           You will be receiving a confirmation call a few days before your appointment from our automated call confirmation system.            Apr 12, 2017  9:00 AM   Individual Therapy with Blanca Brantley L.C.S.W.   [facility]    [address]  [address]  [facility] [address] [phone]            Apr 14, 2017 11:40 AM   Established Patient with Torres Brody M.D.   [facility] [address]    [address] [facility]  [address]  [facility] [address] [phone]           You will be receiving a confirmation call a few days before your appointment from our automated call confirmation system.            Apr 17, 2017 11:00 AM   EST MISC Infusion 2 HR with RN 1   [facility] ([facility])    [address] [facility] [address]  [facility] [address]   [phone]            May 01, 2017  9:30 AM   Follow Up Med Management with Ronny Burnette M.D.   [facility] [address] [facility] ([facility])    [address] [facility]  [address]  [place] [address]   [phone]            May 03,  2017  3:40 PM   Follow Up Visit with Sandoval Fox M.D.   [facility] Neu[facility] (--)    [address] [facility], [address]  [place] [address] [phone]           You will be receiving a confirmation call a few days before your appointment from our automated call confirmation system.            May 16, 2017  1:20 PM   New Patient with Chencho Norman M.D.   [facility] Sleep Medicine (--)    [address]  [place] [address] [phone]           Please bring enclosed paperwork completed along with your insurance card and photo ID.            May 31, 2017  9:00 AM   Individual Therapy with Blanca Brantley L.C.S.W.   [facility]    [address]  [address]  [place] [address] [phone]            Jul 19, 2017  8:40 AM   FOLLOW UP with Torres Kumar M.D.   [facility] (--)    [address] [place] [address]  [place] [address] [phone]

## 2017-04-05 NOTE — ED AVS SNAPSHOT
[employer] Access Code: Activation code not generated  Current [employer] Status: Active    [employer]  A secure, online tool to manage your health information     [employer]’s [employer]® is a secure, online tool that connects you to your personalized health information from the privacy of your home -- day or night - making it very easy for you to manage your healthcare. Once the activation process is completed, you can even access your medical information using the [employer] rocio, which is available for free in the Apple Rocio store or Google Play store.     [employer] provides the following levels of access (as shown below):   My Chart Features   [facility] Primary Care Doctor [facility]  Specialists [facility]  Urgent  Care Non-[facility]  Primary Care  Doctor   Email your healthcare team securely and privately 24/7 X X X X   Manage appointments: schedule your next appointment; view details of past/upcoming appointments X      Request prescription refills. X      View recent personal medical records, including lab and immunizations X X X X   View health record, including health history, allergies, medications X X X X   Read reports about your outpatient visits, procedures, consult and ER notes X X X X   See your discharge summary, which is a recap of your hospital and/or ER visit that includes your diagnosis, lab results, and care plan. X X       How to register for [employer]:  1. Go to  [email or web address].  2. Click on the Sign Up Now box, which takes you to the New Member Sign Up page. You will need to provide the following information:  a. Enter your [employer] Access Code exactly as it appears at the top of this page. (You will not need to use this code after you’ve completed the sign-up process. If you do not sign up before the expiration date, you must request a new code.)   b. Enter your date of birth.   c. Enter your home email address.   d. Click Submit, and follow the next screen’s instructions.  3. Create a [employer] ID. This will  be your [employer] login ID and cannot be changed, so think of one that is secure and easy to remember.  4. Create a [employer] password. You can change your password at any time.  5. Enter your Password Reset Question and Answer. This can be used at a later time if you forget your password.   6. Enter your e-mail address. This allows you to receive e-mail notifications when new information is available in [employer].  7. Click Sign Up. You can now view your health information.    For assistance activating your [employer] account, call [phone]

## 2017-04-05 NOTE — ED NOTES
All discharge instructions given to pt as well as  Appointment scheduled to follow up with Noman Silva tomorrow at 1pm with a 1245 checkin time. Pt educated regarding fasting labs that ERP would like her to have and reason behind them.  Pt verbalized understanding of all discharge instructions. All lines removed prior to discharge. All questions answered. Pt wheeled to lobby by  tech.

## 2017-04-06 ENCOUNTER — OFFICE VISIT (OUTPATIENT)
Dept: MEDICAL GROUP | Facility: MEDICAL CENTER | Age: 27
End: 2017-04-06
Payer: MEDICARE

## 2017-04-06 ENCOUNTER — HOSPITAL ENCOUNTER (OUTPATIENT)
Dept: RADIOLOGY | Facility: MEDICAL CENTER | Age: 27
End: 2017-04-06
Attending: NURSE PRACTITIONER
Payer: MEDICARE

## 2017-04-06 VITALS
HEART RATE: 92 BPM | DIASTOLIC BLOOD PRESSURE: 80 MMHG | TEMPERATURE: 97.9 F | WEIGHT: 255 LBS | SYSTOLIC BLOOD PRESSURE: 116 MMHG | BODY MASS INDEX: 45.18 KG/M2 | RESPIRATION RATE: 14 BRPM | HEIGHT: 63 IN | OXYGEN SATURATION: 98 %

## 2017-04-06 DIAGNOSIS — N39.0 CHRONIC UTI: Chronic | ICD-10-CM

## 2017-04-06 DIAGNOSIS — R07.9 CHEST PAIN, UNSPECIFIED TYPE: ICD-10-CM

## 2017-04-06 DIAGNOSIS — R53.1 PROGRESSIVE FOCAL MOTOR WEAKNESS: ICD-10-CM

## 2017-04-06 LAB
GLUCOSE BLD-MCNC: 200 MG/DL (ref 70–100)
HBA1C MFR BLD: 7.2 % (ref ?–5.8)
INT CON NEG: NEGATIVE
INT CON POS: POSITIVE

## 2017-04-06 PROCEDURE — 99214 OFFICE O/P EST MOD 30 MIN: CPT | Performed by: NURSE PRACTITIONER

## 2017-04-06 PROCEDURE — A9502 TC99M TETROFOSMIN: HCPCS

## 2017-04-06 PROCEDURE — G8432 DEP SCR NOT DOC, RNG: HCPCS | Performed by: NURSE PRACTITIONER

## 2017-04-06 PROCEDURE — 1111F DSCHRG MED/CURRENT MED MERGE: CPT | Performed by: NURSE PRACTITIONER

## 2017-04-06 PROCEDURE — G8419 CALC BMI OUT NRM PARAM NOF/U: HCPCS | Performed by: NURSE PRACTITIONER

## 2017-04-06 PROCEDURE — 665998 HH PPS REVENUE CREDIT

## 2017-04-06 PROCEDURE — 1036F TOBACCO NON-USER: CPT | Performed by: NURSE PRACTITIONER

## 2017-04-06 PROCEDURE — 83036 HEMOGLOBIN GLYCOSYLATED A1C: CPT | Performed by: NURSE PRACTITIONER

## 2017-04-06 PROCEDURE — 700111 HCHG RX REV CODE 636 W/ 250 OVERRIDE (IP)

## 2017-04-06 PROCEDURE — 82962 GLUCOSE BLOOD TEST: CPT | Performed by: NURSE PRACTITIONER

## 2017-04-06 PROCEDURE — 665999 HH PPS REVENUE DEBIT

## 2017-04-06 PROCEDURE — 3045F PR MOST RECENT HEMOGLOBIN A1C LEVEL 7.0-9.0%: CPT | Performed by: NURSE PRACTITIONER

## 2017-04-06 RX ORDER — REGADENOSON 0.08 MG/ML
INJECTION, SOLUTION INTRAVENOUS
Status: COMPLETED
Start: 2017-04-06 | End: 2017-04-06

## 2017-04-06 RX ADMIN — REGADENOSON 0.4 MG: 0.08 INJECTION, SOLUTION INTRAVENOUS at 10:47

## 2017-04-06 NOTE — MR AVS SNAPSHOT
"        Kristin Balderrama   2017 1:00 PM   Office Visit   MRN: [id number]    Department:  [address] [place] [address]   Dept Phone:  [phone]    Description:  Female : 1990   Provider:  AXEL Casas           Reason for Visit     Follow-Up Was in the ER last night for sepsis, UTI. States is needing lab work.      Allergies as of 2017     Allergen Noted Reactions    Cefdinir 2016   Shortness of Breath, Itching    Tolerated 17    Depakote [Divalproex Sodium] 2010   Unspecified    Muscle spasms/muscle pain and weakness      Abilify 2013   Unspecified    Headaches/muscle twitching      Amitriptyline 10/31/2013   Unspecified    Headaches      Amoxicillin 2010   Rash    Pt states \"I get a rash\".      Ciprofloxacin 2009   Rash    Pt states \"I get a rash\".      Clindamycin 2011   Nausea    Even with food      Doxycycline 08/15/2012   Vomiting, Nausea    RXN=unknown    Ees [Erythromycin] 2010   Vomiting, Nausea    Flagyl [Metronidazole Hcl] 2011   Unspecified    \"eye problems\"      Flomax [Tamsulosin Hydrochloride] 2009   Swelling    Metformin 2013   Unspecified    Increased lactic acid       Sulfa Drugs 2010   Hives, Rash    RXN=since childhood    Tape 08/15/2012   Rash    Tears skin off   coban with Tegaderm tape ok  RXN=ongoing    Vancomycin 07/10/2016   Itching    Pt becomes flushed in face and chest.   RXN=7/10/16    Cephalexin [Keflex] 2017   Rash    Pt states she gets a rash with this medication    Erythromycin 2017       Levofloxacin 10/27/2016   Unspecified    Leg muscle cramps    Metronidazole 2017       Valproic Acid 2017         Vital Signs     Blood Pressure Pulse Temperature Respirations Height Weight    116/80 mmHg 92 36.6 °C (97.9 °F) 14 1.6 m (5' 2.99\") 115.667 kg (255 lb)    Body Mass Index Oxygen Saturation Smoking Status             45.18 kg/m2 98% Passive Smoke Exposure - Never Smoker    "      Basic Information     Date Of Birth Sex Race Ethnicity Preferred Language    1990 Female White Non- English      Your appointments     Apr 07, 2017  9:00 AM   PT-HH-OASIS RESUMPTION with Abhishek Baker P.T.   [facility] (--)    [address]  [place] [address]   [phone]            Apr 10, 2017  1:30 PM   SN-HH-HOME VISIT with Jonelle Santacruz R.N.   [facility] (--)    [address]  [place] [address]   [phone]            Apr 11, 2017  2:40 PM   Follow Up Visit with Sandoval Fox M.D.   [facility] (--)    [address]  [place] [address] [phone]           You will be receiving a confirmation call a few days before your appointment from our automated call confirmation system.            Apr 12, 2017  9:00 AM   Individual Therapy with Blanca Brantley L.C.S.W.   [facility]    [address]  [address]  [place] [address] [phone]            Apr 12, 2017 To Be Determined   MSW-HH-HOME VISIT with WINDY Mascorro   [facility] (--)    [address]  [place] [address]   [phone]            Apr 14, 2017 11:40 AM   Established Patient with Torres Brody M.D.   [facility] [address]    [address]  [place] [address] [phone]           You will be receiving a confirmation call a few days before your appointment from our automated call confirmation system.            Apr 14, 2017 To Be Determined   SN-HH-HOME VISIT with Jonelle Santacruz R.N.   [facility] (--)    [address]  [place] [address]   [phone]            Apr 17, 2017 To Be Determined   SN-HH-HOME VISIT with Jonelle Santacruz R.N.   [facility] (--)    [address]  [place] [address]   [phone]            Apr 17, 2017 11:00 AM   EST MISC Infusion 2 HR with RN 1   [facility] ([facility])    [address]  [place] [address]   [phone]               Apr 21, 2017 To Be Determined   SN-HH-HOME VISIT with Shelbi Ruby R.N.   [facility] (--)    [address]  [place] [address]   [phone]            Apr 24, 2017 To Be Determined   SN-HH-HOME VISIT with Jonelle Santacruz R.N.   [facility] (--)    [address]  [place] [address]   [phone]            Apr 28, 2017 To Be Determined   SN-HH-HOME VISIT with Shelbi Ruby R.N.   [facility] (--)    [address]  [place] [address]   [phone]            May 01, 2017  9:30 AM   Follow Up Med Management with Ronny Burnette M.D.   [facility] [address] [place] [address])    [address]  [address]  [place] [address]   [phone]            May 03, 2017  3:40 PM   Follow Up Visit with Sandoval Fox M.D.   [facility] (--)    [address]  [place] [address] [phone]           You will be receiving a confirmation call a few days before your appointment from our automated call confirmation system.            May 16, 2017  1:20 PM   New Patient with Chencho Norman M.D.   [facility] (--)    [address]  [place] [address] [phone]           Please bring enclosed paperwork completed along with your insurance card and photo ID.            May 31, 2017  9:00 AM   Individual Therapy with Blanca Brantley L.C.S.W.   [facility]    [address]  [address]  [place] [address] [phone]            Jul 19, 2017  8:40 AM   FOLLOW UP with Torres Kumar M.D.   [facility] (--)    [address] [place] [address] [address]  [place] [id number] [phone]              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 Low  9/18/2010 - Present    Neurogenic bladder N31.9 Low  4/2/2011 - Present    Sinus tachycardia (Chronic) R00.0 High   10/31/2013 - Present    Knee pain, right M25.561 Low  2/13/2014 - Present    Anxiety F41.9 Low  12/16/2014 - Present    Fatty liver disease, nonalcoholic K76.0 Low  1/19/2015 - Present    Progressive focal motor weakness M62.81 Low  6/28/2015 - Present    Chronic back pain M54.9, G89.29 Low  6/29/2015 - Present    Hypothyroidism (Chronic) E03.9 Low  11/23/2015 - Present    PCOS (polycystic ovarian syndrome) E28.2 Low  11/23/2015 - Present    H/O prior ablation treatment Z98.890   2/10/2016 - Present    Peripheral neuropathy (CMS-HCC) (Chronic) G62.9   3/6/2016 - Present    TONYA on CPAP G47.33 Low  3/7/2016 - Present    Morbidly obese (CMS-HCC) E66.01   3/7/2016 - Present    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   3/7/2016 - Present    Scoliosis M41.9   3/7/2016 - Present    GERD (gastroesophageal reflux disease) (Chronic) K21.9   3/7/2016 - Present    Vitamin D deficiency E55.9   5/21/2016 - Present    Chronic inflammatory arthritis (Chronic) M19.90 Medium  5/23/2016 - Present    Right flank pain R10.9   6/6/2016 - Present    Weakness of both lower extremities M62.81   6/22/2016 - Present    Elevated sedimentation rate R70.0   6/27/2016 - Present    Galactorrhea O92.6   7/22/2016 - Present    Psychosis, schizophrenia, simple (HCC) (Chronic) F20.89 Low Chronic 9/29/2016 - Present    Schizophrenia (CMS-HCC) F20.9 Low  10/27/2016 - Present    Paralysis (CMS-HCC) G83.9 High  10/27/2016 - Present    Chronic pain syndrome (Chronic) G89.4 Medium  10/27/2016 - Present    Suprapubic catheter (CMS-HCC) (Chronic) Z93.59 Low  10/27/2016 - Present    Bowel and bladder incontinence R32, R15.9   10/27/2016 - Present    Depression F32.9 Low  10/28/2016 - Present    HTN (hypertension) (Chronic) I10 Medium  11/1/2016 - Present    Hypovitaminosis D E55.9   11/29/2016 - Present    Leg weakness M62.81   1/4/2017 - Present    Weakness R53.1   1/22/2017 - Present    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 High  1/24/2017 - Present    "   Chronic suprapubic catheter (CMS-HCC) Z93.59   2/16/2017 - Present    Leg weakness, bilateral M62.81   2/22/2017 - Present    Weakness of right upper extremity M62.81   2/23/2017 - Present    Chest pain R07.9   3/30/2017 - Present      Health Maintenance        Date Due Completion Dates    IMM HEP A VACCINE (1 of 2 - Standard Series) 10/13/1990 ---    IMM VARICELLA (CHICKENPOX) VACCINE (1 of 2 - 2 Dose Adolescent Series) 10/13/2002 ---    PAP SMEAR 7/22/2019 7/22/2016 (Postponed), 2/19/2013 (N/S)    Override on 7/22/2016: Postponed (per pt was told could \"skip\" 2016)    Override on 2/19/2013: (N/S) ([facility])    IMM DTaP/Tdap/Td Vaccine (7 - Td) 8/6/2022 8/6/2012, 9/18/2010, 3/3/1994, 5/17/1991, 5/10/1990, 3/23/1990, 1990    COLONOSCOPY 9/25/2023 9/25/2013            Current Immunizations     Dtap Vaccine 3/3/1994, 5/17/1991, 5/10/1990, 3/23/1990, 1990    HIB Vaccine(PEDVAX) 1/11/1991    HPV Quadrivalent Vaccine (GARDASIL) 10/27/2011, 5/27/2011, 3/1/2011    Hepatitis B Vaccine Non-Recombivax (Ped/Adol) 1/28/2004, 10/28/2003, 10/29/1999    Influenza TIV (IM) 9/5/2013, 12/7/2011, 11/7/2011    Influenza Vaccine Adult HD 10/5/2016    MMR Vaccine 3/3/1994, 1/11/1991    OPV - Historical Data 3/3/1994, 5/17/1991, 5/10/1990, 3/23/1990, 1990    Pneumococcal Vaccine (PCV7) Historical Data 11/8/2015    Pneumococcal polysaccharide vaccine (PPSV-23) 1/23/2017  5:35 PM    TD Vaccine 9/18/2010  4:45 PM    Tdap Vaccine 8/6/2012      Below and/or attached are the medications your provider expects you to take. Review all of your home medications and newly ordered medications with your provider and/or pharmacist. Follow medication instructions as directed by your provider and/or pharmacist. Please keep your medication list with you and share with your provider. Update the information when medications are discontinued, doses are changed, or new medications (including over-the-counter products) are added; and carry " medication information at all times in the event of emergency situations     Allergies:  CEFDINIR - Shortness of Breath,Itching     DEPAKOTE - Unspecified     ABILIFY - Unspecified     AMITRIPTYLINE - Unspecified     AMOXICILLIN - Rash     CIPROFLOXACIN - Rash     CLINDAMYCIN - Nausea     DOXYCYCLINE - Vomiting,Nausea     EES - Vomiting,Nausea     FLAGYL - Unspecified     FLOMAX - Swelling     METFORMIN - Unspecified     SULFA DRUGS - Hives,Rash     TAPE - Rash     VANCOMYCIN - Itching     CEPHALEXIN - Rash     ERYTHROMYCIN - (reactions not documented)     LEVOFLOXACIN - Unspecified     METRONIDAZOLE - (reactions not documented)     VALPROIC ACID - (reactions not documented)               Medications  Valid as of: April 06, 2017 -  1:59 PM    Generic Name Brand Name Tablet Size Instructions for use    Albuterol Sulfate (Aero Soln) albuterol 108 (90 BASE) MCG/ACT Inhale 2 Puffs by mouth every 6 hours as needed for Shortness of Breath.        Aspirin (Tablet Delayed Response) ECOTRIN 81 MG Take 1 Tab by mouth every day.        Atorvastatin Calcium (Tab) LIPITOR 40 MG Take 1 Tab by mouth every bedtime.        Baclofen (Tab) LIORESAL 10 MG Take 10 mg by mouth 3 times a day.        Cefpodoxime Proxetil (Tab) VANTIN 200 MG         Ciprofloxacin HCl (Tab) CIPRO 500 MG Take 1 Tab by mouth every 12 hours for 14 days.        Ciprofloxacin HCl (Tab) CIPRO 500 MG Take 500 mg by mouth 2 times a day.        Cranberry (Cap) Cranberry 1000 MG Take 1 Cap by mouth 2 times a day, with meals.        Cyanocobalamin (Tab) vitamin b12 500 MCG Take 1,000 mcg by mouth 2 times a day.        Cyclobenzaprine HCl (Tab) FLEXERIL 10 MG Take 10 mg by mouth 2 Times a Day. Indications: Muscle Spasm        Ergocalciferol (Cap) DRISDOL 58559 UNITS Take 50,000 Units by mouth every Friday.        FentaNYL (PATCH 72 HR) DURAGESIC 25 MCG/HR Apply 1 Patch to skin as directed every 72 hours.        Fluconazole (Tab) DIFLUCAN 150 MG         FLUoxetine HCl  (Cap) PROZAC 10 MG TAKE ONE CAPSULE BY MOUTH ONCE A DAY        Furosemide (Tab) LASIX 20 MG Take 20 mg by mouth every day.        Gabapentin (Tab) NEURONTIN 600 MG Take 600-1,200 mg by mouth 2 times a day. 600 mg AM   1200 mg PM        Gabapentin (Once-Daily) (Tab) GRALISE 600 MG Take 600 mg by mouth 3 times a day.        Ivabradine HCl (Tab) CORLANOR 5 MG Take 5 mg by mouth 2 times a day, with meals.        Lactulose (Solution) lactulose 10 GM/15ML Take 10 g by mouth 2 times a day.        Levothyroxine Sodium (Tab) SYNTHROID 75 MCG Take 75 mcg by mouth Every morning on an empty stomach.        Melatonin (Tab) Melatonin 5 MG Take 10 mg by mouth every bedtime.        Nitroglycerin (SL Tab) NITROSTAT 0.4 MG Place 1 Tab under tongue as needed for Chest Pain.        Nystatin (Powder) MYCOSTATIN  Apply 1 Application to affected area(s) 3 times a day.        Omeprazole (CAPSULE DELAYED RELEASE) PRILOSEC 20 MG Take 20 mg by mouth 2 times a day.        OxyCODONE HCl (Tab) ROXICODONE 5 MG Take 1 Tab by mouth every four hours as needed.        Oxycodone-Acetaminophen (Tab) PERCOCET-10  MG Take  by mouth.        PredniSONE (Tab) DELTASONE 10 MG         Promethazine HCl (Tab) PHENERGAN 25 MG Take 1 Tab by mouth every 6 hours as needed for Nausea/Vomiting.        RaNITidine HCl (Tab) ZANTAC 150 MG Take 150 mg by mouth 2 times a day.        Sodium Bicarbonate (Tab) sodium bicarbonate 325 MG Take 2 Tabs by mouth 3 times a day.        Ziprasidone HCl (Cap) GEODON 80 MG Take 160 mg by mouth every evening. DO NOT GIVE PAST 1700        .                 Medicines prescribed today were sent to:     [id number] - [place], [place] - [address]    [address] [place] [address]    Phone: [phone] Fax: [phone]    Open 24 Hours?: No      Medication refill instructions:       If your prescription bottle indicates you have medication refills left, it is not necessary to call your provider’s office. Please  contact your pharmacy and they will refill your medication.    If your prescription bottle indicates you do not have any refills left, you may request refills at any time through one of the following ways: The online DocDep system (except Urgent Care), by calling your provider’s office, or by asking your pharmacy to contact your provider’s office with a refill request. Medication refills are processed only during regular business hours and may not be available until the next business day. Your provider may request additional information or to have a follow-up visit with you prior to refilling your medication.   *Please Note: Medication refills are assigned a new Rx number when refilled electronically. Your pharmacy may indicate that no refills were authorized even though a new prescription for the same medication is available at the pharmacy. Please request the medicine by name with the pharmacy before contacting your provider for a refill.        Other Notes About Your Plan     DME:  [facility] / ph [phone] / fax [phone]              [employer] Access Code: Activation code not generated  Current DocDep Status: Active

## 2017-04-07 ENCOUNTER — HOME CARE VISIT (OUTPATIENT)
Dept: HOME HEALTH SERVICES | Facility: HOME HEALTHCARE | Age: 27
End: 2017-04-07
Payer: MEDICARE

## 2017-04-07 ENCOUNTER — TELEPHONE (OUTPATIENT)
Dept: MEDICAL GROUP | Facility: MEDICAL CENTER | Age: 27
End: 2017-04-07

## 2017-04-07 ENCOUNTER — TELEPHONE (OUTPATIENT)
Dept: CARDIOLOGY | Facility: MEDICAL CENTER | Age: 27
End: 2017-04-07

## 2017-04-07 VITALS
TEMPERATURE: 97.3 F | DIASTOLIC BLOOD PRESSURE: 90 MMHG | HEART RATE: 90 BPM | SYSTOLIC BLOOD PRESSURE: 142 MMHG | BODY MASS INDEX: 44.36 KG/M2 | WEIGHT: 250.38 LBS | RESPIRATION RATE: 18 BRPM

## 2017-04-07 VITALS — DIASTOLIC BLOOD PRESSURE: 76 MMHG | SYSTOLIC BLOOD PRESSURE: 146 MMHG | RESPIRATION RATE: 20 BRPM | TEMPERATURE: 97.2 F

## 2017-04-07 PROCEDURE — 665999 HH PPS REVENUE DEBIT

## 2017-04-07 PROCEDURE — G0151 HHCP-SERV OF PT,EA 15 MIN: HCPCS

## 2017-04-07 PROCEDURE — 665998 HH PPS REVENUE CREDIT

## 2017-04-07 PROCEDURE — G0299 HHS/HOSPICE OF RN EA 15 MIN: HCPCS

## 2017-04-07 ASSESSMENT — BALANCE ASSESSMENTS
ARISES: 1
ATTEMPTS TO ARISE: 2
IMMEDIATE STANDING BALANCE FIRST 5 SECONDS: 1
SURVEY COMPLETE: TRUE
SITTING DOWN: 1
STANDING BALANCE: 1
SITTING BALANCE: 1
NUDGED: 1
BALANCE SCORE: 9
TURNING 360 DEGREES STEPS: 0
TURNING 360 DEGREES STEADINESS: 1
EYES CLOSED AT MAXIMUM POSITION NUDGED: 0

## 2017-04-07 ASSESSMENT — GAIT ASSESSMENTS
LEFT STEP CLEAR: 1
TRUNK: 0
STEP SYMMETRY: 1
RIGHT STEP PASS: 1
SURVEY COMPLETE: TRUE
GAIT SCORE: 8
STEP CONTINUITY: 1
INITIATION OF GAIT IMMEDIATELY AFTER GO: 1
PATH: 1
LEFT STEP PASS: 1
BALANCE AND GAIT SCORE: 17
TIME: 0
RIGHT STEP CLEAR: 1

## 2017-04-07 ASSESSMENT — ENCOUNTER SYMPTOMS
DEPRESSED MOOD: 1
VOMITING: NO
NERVOUS/ANXIOUS: 1
MENTAL STATUS CHANGE: 0
NAUSEA: OCCASIONAL

## 2017-04-07 NOTE — PROGRESS NOTES
Subjective:      Kristin Balderrama is a 26 y.o. female who presents with Follow-Up            HPI Kristin Balderrama is here today accompanied by her mother for UTI concerns and follow-up after recent emergency room visit for this.      1. Uncontrolled type 2 diabetes mellitus without complication, without long-term current use of insulin (CMS-[facility])  Reviewing patient's labs from her ER visit shows a postprandial blood sugar at 349. Patient has no history of type 2 diabetes with her last hemoglobin A1c 5.8, 3 months ago. However, she is morbidly obese, inactive, and taking steroids for her focal motor weakness. Hemoglobin A1c in the office today is 7.2.    2. Chronic UTI  Patient went to the emergency room as she does frequently for complaints of urinary issues. She is on Cipro currently from another [facility] clinic and she was reassured in the emergency room she did not have sepsis or UTI. Urine dip in the office today shows no leukocytes or nitrates.    3. Progressive focal motor weakness  Patient has been worked up for this problem and currently she is being treated with prednisone.    4. Chest pain, unspecified type  Patient had started complaining of chest pain recently and had full workup in the emergency room including troponin studies, BNP, chest x-ray, EKGs, and echocardiogram which were negative. She went for a stress test this morning and at the time of dictation review shows it normal. Patient has mental health issues has been very concerned for her recurrent chest pain which she feels she needs to take nitroglycerin for frequently.    Social History   Substance Use Topics   • Smoking status: Passive Smoke Exposure - Never Smoker     Types: Cigarettes   • Smokeless tobacco: Never Used   • Alcohol Use: No     Current Outpatient Prescriptions   Medication Sig Dispense Refill   • Misc. Devices Misc victoza needles to use QD 30 Each 11   • liraglutide (VICTOZA) 18 MG/3ML Solution Pen-injector injection  Inject 0.2 mL as instructed every day. 6 mL 11   • fluoxetine (PROZAC) 10 MG Cap TAKE ONE CAPSULE BY MOUTH ONCE A DAY 30 Cap 2   • furosemide (LASIX) 20 MG Tab Take 20 mg by mouth every day.     • ranitidine (ZANTAC) 150 MG Tab Take 150 mg by mouth 2 times a day.     • baclofen (LIORESAL) 10 MG Tab Take 10 mg by mouth 3 times a day.     • ciprofloxacin (CIPRO) 500 MG Tab Take 1 Tab by mouth every 12 hours for 14 days. 28 Tab 0   • predniSONE (DELTASONE) 10 MG Tab      • GRALISE 600 MG Tab Take 600 mg by mouth 3 times a day.     • fluconazole (DIFLUCAN) 150 MG tablet      • cefpodoxime (VANTIN) 200 MG Tab      • oxycodone-acetaminophen (PERCOCET-10)  MG Tab Take  by mouth.     • nitroglycerin (NITROSTAT) 0.4 MG SL Tab Place 1 Tab under tongue as needed for Chest Pain. 25 Tab 1   • oxycodone immediate-release (ROXICODONE) 5 MG Tab Take 1 Tab by mouth every four hours as needed. 30 Tab 0   • nystatin (MYCOSTATIN) Powder Apply 1 Application to affected area(s) 3 times a day. 1 Bottle 2   • Cranberry 1000 MG Cap Take 1 Cap by mouth 2 times a day, with meals. 60 Cap 3   • gabapentin (NEURONTIN) 600 MG tablet Take 600-1,200 mg by mouth 2 times a day. 600 mg AM   1200 mg PM     • Melatonin 5 MG Tab Take 10 mg by mouth every bedtime.     • ivabradine (CORLANOR) 5 MG Tab tablet Take 5 mg by mouth 2 times a day, with meals.     • atorvastatin (LIPITOR) 40 MG Tab Take 1 Tab by mouth every bedtime. 30 Tab 0   • aspirin EC (ECOTRIN) 81 MG Tablet Delayed Response Take 1 Tab by mouth every day. 30 Tab 0   • ziprasidone (GEODON) 80 MG Cap Take 160 mg by mouth every evening. DO NOT GIVE PAST 1700     • fentanyl (DURAGESIC) 25 MCG/HR PATCH 72 HR Apply 1 Patch to skin as directed every 72 hours.     • lactulose 10 GM/15ML Solution Take 10 g by mouth 2 times a day.     • vitamin D, Ergocalciferol, (DRISDOL) 61015 UNITS Cap capsule Take 50,000 Units by mouth every Friday.     • cyclobenzaprine (FLEXERIL) 10 MG Tab Take 10 mg by  "mouth 2 Times a Day. Indications: Muscle Spasm     • promethazine (PHENERGAN) 25 MG Tab Take 1 Tab by mouth every 6 hours as needed for Nausea/Vomiting. 30 Tab 0   • albuterol 108 (90 BASE) MCG/ACT Aero Soln inhalation aerosol Inhale 2 Puffs by mouth every 6 hours as needed for Shortness of Breath.     • cyanocobalamin (HM VITAMIN B12) 500 MCG tablet Take 1,000 mcg by mouth 2 times a day.     • sodium bicarbonate 325 MG Tab Take 2 Tabs by mouth 3 times a day.     • levothyroxine (SYNTHROID) 75 MCG Tab Take 75 mcg by mouth Every morning on an empty stomach.     • omeprazole (PRILOSEC) 20 MG delayed-release capsule Take 20 mg by mouth 2 times a day.     • ciprofloxacin (CIPRO) 500 MG Tab Take 500 mg by mouth 2 times a day.       No current facility-administered medications for this visit.     Past Medical History   Diagnosis Date   • Scoliosis    • Multiple personality disorder    • Chronic UTI 9/18/2010   • Heart burn    • Pain 08-15-12     back, 7/10   • History of falling    • Sinus tachycardia 10/31/2013   • Urinary incontinence    • Hypertension    • Disorder of thyroid    • Obesity    • Pneumonia 2012   • ASTHMA      when around smoke   • Breath shortness      with tachycardia   • Anginal syndrome      random chest pain especially with tachycardia   • Psychosis    • Arthritis      osteo   • PCOS (polycystic ovarian syndrome)    • Gynecological disorder      PCOS   • Renal disorder      \"kidney disease, stage 1\" nephrologist, Dr. Vallejo   • Arrhythmia      \"sinus tachycardia\", cariologist, Dr. 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   • Sleep apnea      CPAP \"pulmonary doctor took me off mid year 2016\"   • 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CMS-HCC)    • Fall      Family History   Problem Relation Age of Onset   • Hypertension Mother    • Sleep Apnea Mother    • Heart Disease Mother    • Other Mother      hypothryod   • " "Hypertension Maternal Uncle    • Heart Disease Maternal Grandmother    • Hypertension Maternal Grandmother    • Other Sister      Narcolepsy;fibromyalsia;bone;nerve   • Genitourinary () Sister      endometriosis       Review of Systems   Cardiovascular: Positive for chest pain.   Psychiatric/Behavioral: The patient is nervous/anxious.    All other systems reviewed and are negative.         Objective:     /80 mmHg  Pulse 92  Temp(Src) 36.6 °C (97.9 °F)  Resp 14  Ht 1.6 m (5' 2.99\")  Wt 115.667 kg (255 lb)  BMI 45.18 kg/m2  SpO2 98%     Physical Exam   Constitutional: She is oriented to person, place, and time. She appears well-developed and well-nourished. No distress.   HENT:   Head: Normocephalic and atraumatic.   Right Ear: External ear normal.   Left Ear: External ear normal.   Nose: Nose normal.   Eyes: Right eye exhibits no discharge. Left eye exhibits no discharge.   Neck: Normal range of motion. Neck supple. No thyromegaly present.   Cardiovascular: Normal rate, regular rhythm and normal heart sounds.  Exam reveals no gallop and no friction rub.    No murmur heard.  Pulmonary/Chest: Effort normal and breath sounds normal. She has no wheezes. She has no rales.   Genitourinary:   Andrade catheter in place and urine sample taken from catheter.   Musculoskeletal: She exhibits no edema or tenderness.   Patient uses walker for mobility.   Neurological: She is alert and oriented to person, place, and time. She displays normal reflexes.   Skin: Skin is warm and dry. No rash noted. She is not diaphoretic.   Psychiatric: She has a normal mood and affect. Her behavior is normal. Judgment and thought content normal.   Nursing note and vitals reviewed.              Assessment/Plan:     1. Uncontrolled type 2 diabetes mellitus without complication, without long-term current use of insulin (CMS-[facility])  I reviewed with patient that her hemoglobin A1c has gone up from 5.8 to her current 7.2 and she had a blood " sugar of 349 yesterday and a postprandial blood sugar in the office today is 200. I discussed case with her PCP. She states she is allergic to metformin so I will place her on Victoza at 0.6 mL and then increasing to 1.2 mL's in a week. She will follow back with her PCP to see how she is doing on medication.  - POCT  A1C  - POCT glucose  - Misc. Devices Misc; victoza needles to use QD  Dispense: 30 Each; Refill: 11  - liraglutide (VICTOZA) 18 MG/3ML Solution Pen-injector injection; Inject 0.2 mL as instructed every day.  Dispense: 6 mL; Refill: 11    2. Chronic UTI  I reassured patient as of today, she does not look like she has UTI and she is currently on Cipro.  - POCT Urinalysis    3. Progressive focal motor weakness  Patient will continue to follow with neurology for this.    4. Chest pain, unspecified type  It does not appear the patient's chest pain is cardiac related and she will be following up with cardiology for the results of her stress test.

## 2017-04-07 NOTE — TELEPHONE ENCOUNTER
1. Caller Name: Kristin Balderrama                                         Call Back Number: [phone] (home)         Patient approves a detailed voicemail message: yes    2. What is being requested? Patient needs new Rx for Freestyle flash test strips #50    2. What supplies does the patient need? Test strips    3. What brand of meter does the patient use? Freestyle flash    4. How many times a day is the patient testing? 2 times a day    Dx: E11.65, is using insulin. Last A1c =   Lab Results   Component Value Date/Time    GLYCOHEMOGLOBIN 7.2 04/06/2017 02:41 PM    GLYCOHEMOGLOBIN 5.8* 12/07/2016 07:14 AM   .

## 2017-04-08 PROCEDURE — 665999 HH PPS REVENUE DEBIT

## 2017-04-08 PROCEDURE — 665998 HH PPS REVENUE CREDIT

## 2017-04-08 NOTE — TELEPHONE ENCOUNTER
Spoke with pt and gave her results.  She had her grandmother come on the line and I scheduled pt for f/u with Dr. Kumar.  Recommened PCP follow up to see if symptoms are GI related.

## 2017-04-08 NOTE — TELEPHONE ENCOUNTER
----- Message from VANIA Sim sent at 4/7/2017  5:09 PM PDT -----  Regarding: RE: Patient called for Stress Test results  Stress test showed no Ischemia.  Cardiac workup has been normal.  Will have her Follow up with Dr. Kumar as well.  Wonder if there could be a potential GI source?  Should talk with PCP about this.     Thanks,  lan    ----- Message -----     From: Valarie Maurer R.N.     Sent: 4/7/2017   4:53 PM       To: VANIA Sim  Subject: FW: Patient called for Stress Test results       Lan,  Please see your notes.  Pt may have questions regarding her results.   ----- Message -----     From: Lynne Wilder     Sent: 4/7/2017   3:00 PM       To: Valarie Maurer R.N.  Subject: Patient called for Stress Test results           BRANDON/Valarie    Patient wants a call back about her Stress Test results and can be reached at [phone].

## 2017-04-09 ENCOUNTER — HOME CARE VISIT (OUTPATIENT)
Dept: HOME HEALTH SERVICES | Facility: HOME HEALTHCARE | Age: 27
End: 2017-04-09
Payer: MEDICARE

## 2017-04-09 PROCEDURE — 665999 HH PPS REVENUE DEBIT

## 2017-04-09 PROCEDURE — 665998 HH PPS REVENUE CREDIT

## 2017-04-10 ENCOUNTER — HOME CARE VISIT (OUTPATIENT)
Dept: HOME HEALTH SERVICES | Facility: HOME HEALTHCARE | Age: 27
End: 2017-04-10
Payer: MEDICARE

## 2017-04-10 VITALS
DIASTOLIC BLOOD PRESSURE: 100 MMHG | TEMPERATURE: 98 F | HEART RATE: 90 BPM | RESPIRATION RATE: 20 BRPM | SYSTOLIC BLOOD PRESSURE: 150 MMHG

## 2017-04-10 PROCEDURE — 665998 HH PPS REVENUE CREDIT

## 2017-04-10 PROCEDURE — G0495 RN CARE TRAIN/EDU IN HH: HCPCS

## 2017-04-10 PROCEDURE — 665999 HH PPS REVENUE DEBIT

## 2017-04-10 ASSESSMENT — ENCOUNTER SYMPTOMS
DEBILITATING PAIN: 1
SHORTNESS OF BREATH: T
SEVERE DYSPNEA: 1
POOR JUDGMENT: 1

## 2017-04-11 ENCOUNTER — HOME CARE VISIT (OUTPATIENT)
Dept: HOME HEALTH SERVICES | Facility: HOME HEALTHCARE | Age: 27
End: 2017-04-11
Payer: MEDICARE

## 2017-04-11 ENCOUNTER — OFFICE VISIT (OUTPATIENT)
Dept: NEUROLOGY | Facility: MEDICAL CENTER | Age: 27
End: 2017-04-11
Payer: MEDICARE

## 2017-04-11 VITALS
BODY MASS INDEX: 44.3 KG/M2 | HEART RATE: 94 BPM | HEIGHT: 63 IN | OXYGEN SATURATION: 97 % | SYSTOLIC BLOOD PRESSURE: 120 MMHG | TEMPERATURE: 97.9 F | DIASTOLIC BLOOD PRESSURE: 84 MMHG | RESPIRATION RATE: 16 BRPM | WEIGHT: 250 LBS

## 2017-04-11 DIAGNOSIS — R53.1 PROGRESSIVE FOCAL MOTOR WEAKNESS: Primary | ICD-10-CM

## 2017-04-11 PROCEDURE — 665998 HH PPS REVENUE CREDIT

## 2017-04-11 PROCEDURE — 99213 OFFICE O/P EST LOW 20 MIN: CPT | Performed by: PSYCHIATRY & NEUROLOGY

## 2017-04-11 PROCEDURE — G8419 CALC BMI OUT NRM PARAM NOF/U: HCPCS | Performed by: PSYCHIATRY & NEUROLOGY

## 2017-04-11 PROCEDURE — 1111F DSCHRG MED/CURRENT MED MERGE: CPT | Performed by: PSYCHIATRY & NEUROLOGY

## 2017-04-11 PROCEDURE — 1036F TOBACCO NON-USER: CPT | Performed by: PSYCHIATRY & NEUROLOGY

## 2017-04-11 PROCEDURE — G8432 DEP SCR NOT DOC, RNG: HCPCS | Performed by: PSYCHIATRY & NEUROLOGY

## 2017-04-11 PROCEDURE — G0155 HHCP-SVS OF CSW,EA 15 MIN: HCPCS

## 2017-04-11 PROCEDURE — 665999 HH PPS REVENUE DEBIT

## 2017-04-11 NOTE — PROGRESS NOTES
"Subjective:      Kristin Balderrama is a 26 y.o. female who presents with her grandmother, for follow-up, with a history of fluctuating quadriparesis, still of unclear etiology.     HPI    Dilmas weakness and sensory distortions remain problematic, they continue to fluctuate with some progression over time. She has been admitted on multiple occasions for the weakness, but more recently because of recurrent bouts of sepsis. With her admission for the weakness symptoms primarily, 3 months ago, 5 days of IV steroids provided consistent benefit. It was planned that she would receive a monthly IV methylprednisolone infusion. She did test positive for microsomal antibodies, but anti-thyroid globulin antibody titers have been negative so far. CSF studies, neurophysiologic studies, imaging studies, etc. have all proven unremarkable. Even a muscle biopsy was nondiagnostic.    Medical, surgical and family histories are otherwise reviewed, there are no new drug allergies. She is on a list of medicines longer than my arm, Prozac, baclofen, Geodon, baby aspirin daily, gabapentin 600 mg, 1-2 tablets twice a day, Synthroid, rest as per the electronic health record, not clinically significant from my standpoint.    Review of Systems   All other systems reviewed and are negative.       Objective:     /84 mmHg  Pulse 94  Temp(Src) 36.6 °C (97.9 °F)  Resp 16  Ht 1.6 m (5' 2.99\")  Wt 113.399 kg (250 lb)  BMI 44.30 kg/m2  SpO2 97%     Physical Exam    She appears in no acute distress. Vital signs are stable, she remained severely overweight. There is no malar rash or jaw claudication. The neck is supple, range of motion is full, Lhermitte phenomena is absent. Cardiac evaluation is unremarkable. Carotid pulses are present without asymmetry.    She is fully oriented, there is no aphasia. Cranial nerve exam is unremarkable. There is weakness with all 4 extremities, but there is still significant inconsistency with effort. " "Exam suggests profound hip flexion weakness at 4 minus/5, knee flexors and extensors at about 4/5, distally with plantar and dorsiflexion at 4/5. Reflexes are still present at all points, nontender drop, both toes are downgoing. When unobserved at the end of the interview, she stands very easily, reaches for her walker and ambulates without a foot drop or need to elevate the hips. Sensory exam is remarkable for subjective decrement of vibration to the knees, pinprick below the knees, and pinprick and vibration in the hands. Joint position sense is impaired to the knees bilaterally. It is intact in the hands.     Assessment/Plan:     1. Progressive focal motor weakness  IV methylprednisolone 1 g will be administered every month for the next 4 months, to see if we can stabilize the condition. I would like to repeat EMG/NCV studies given the apparent \"progression\" of the weakness that she has as well as the persistence of sensory symptoms. Examination remains inconsistent at best, I suspect there is a functional component to all of her symptoms, though there may be an underlying organicity as well. I will follow-up with her in another for 5 months, at that point schedule EMG studies will be made.    Time: Evaluation 20 minutes for exam come review, discussion, and education  Discussion: As mentioned in the assessment, over 60% of the time spent face-to-face counseling and coordinating care        "

## 2017-04-11 NOTE — MR AVS SNAPSHOT
"        Kristin Balderrama   2017 2:40 PM   Office Visit   MRN: [id number]    Department:  [facility]   Dept Phone:  [phone]    Description:  Female : 1990   Provider:  Sandoval Fox M.D.           Reason for Visit     Follow-Up weakness      Allergies as of 2017     Allergen Noted Reactions    Cefdinir 2016   Shortness of Breath, Itching    Tolerated 17    Depakote [Divalproex Sodium] 2010   Unspecified    Muscle spasms/muscle pain and weakness      Abilify 2013   Unspecified    Headaches/muscle twitching      Amitriptyline 10/31/2013   Unspecified    Headaches      Amoxicillin 2010   Rash    Pt states \"I get a rash\".      Ciprofloxacin 2009   Rash    Pt states \"I get a rash\".      Clindamycin 2011   Nausea    Even with food      Doxycycline 08/15/2012   Vomiting, Nausea    RXN=unknown    Ees [Erythromycin] 2010   Vomiting, Nausea    Flagyl [Metronidazole Hcl] 2011   Unspecified    \"eye problems\"      Flomax [Tamsulosin Hydrochloride] 2009   Swelling    Metformin 2013   Unspecified    Increased lactic acid       Sulfa Drugs 2010   Hives, Rash    RXN=since childhood    Tape 08/15/2012   Rash    Tears skin off   coban with Tegaderm tape ok  RXN=ongoing    Vancomycin 07/10/2016   Itching    Pt becomes flushed in face and chest.   RXN=7/10/16    Cephalexin [Keflex] 2017   Rash    Pt states she gets a rash with this medication    Erythromycin 2017       Levofloxacin 10/27/2016   Unspecified    Leg muscle cramps    Metronidazole 2017       Valproic Acid 2017         Vital Signs     Blood Pressure Pulse Temperature Respirations Height Weight    120/84 mmHg 94 36.6 °C (97.9 °F) 16 1.6 m (5' 2.99\") 113.399 kg (250 lb)    Body Mass Index Oxygen Saturation Smoking Status             44.30 kg/m2 97% Passive Smoke Exposure - Never Smoker         Basic Information     Date Of Birth Sex Race " Ethnicity Preferred Language    1990 Female White Non- English      Your appointments     Apr 13, 2017  9:00 AM   PT-HH-HOME VISIT with Abhishek Baker P.T.   [facility] (--)    [address]  [place] [address]   [phone]            Apr 13, 2017 11:00 AM   DIETICIAN-HH-HOME VIS/ASSESS with Zohra Franco RD   [facility] (--)    [address]  [place] [address]   [phone]            Apr 14, 2017  8:00 AM   SN-HH-HOME VISIT with Jonelle Santacruz R.N.   [facility] (--)    [address]  [place] [address]   [phone]            Apr 14, 2017 11:40 AM   Established Patient with Torres Brody M.D.   [facility] [address]    [address]  [address]  [place] [address]   [phone]           You will be receiving a confirmation call a few days before your appointment from our automated call confirmation system.            Apr 17, 2017 11:00 AM   EST MISC Infusion 2 HR with RN 1   [facility] ([facility])    [address] [facility] [address]  [place] [address]   [phone]            Apr 17, 2017  2:30 PM   SN-HH-HOME VISIT with Jonelle Santacruz R.N.   [facility] (--)    [address]  [place] [address]   [phone]            Apr 21, 2017  9:00 AM   IN-QX-BAXHQXMSQS DISCHARGE with Abhishek Baker P.T.   [facility] (--)    [address]  [place] [address]   [phone]            Apr 21, 2017 To Be Determined   SN-HH-HOME VISIT with Shelbi Ruby R.N.   [facility] (--)    [address]  [place] [address]   [phone]            Apr 24, 2017 To Be Determined   SN-HH-HOME VISIT with TAY [facility] (--)    [address]  [facility] [address]   [phone]            Apr 28, 2017 To Be Determined   SN--HOME VISIT with Shelbi Ruby R.N.   [facility] (--)    [address]  [place] [address]   [phone]            May 01, 2017  9:30 AM   Follow Up Med Management  with Ronny Burnette M.D.   [facility] [address] [place] [address] ([facility])    [address] [facility] [address]  [address]  [place] [address]   [phone]            May 01, 2017 11:20 AM   FOLLOW UP with Torres Kumar M.D.   [facility] (--)    [address] [place] [address] [address]  [place] [address] [phone]            May 16, 2017  1:20 PM   New Patient with Chencho Norman M.D.   [facility] (--)    [address]  [place] [address] [phone]           Please bring enclosed paperwork completed along with your insurance card and photo ID.            May 31, 2017  9:00 AM   Individual Therapy with Blanca Brantley L.C.S.W.   [facility]    [address]  [place] [address] [phone]            Jul 17, 2017 10:00 AM   Individual Therapy with Blanca Brantley L.C.S.W.   [facility]    [address]  [place] [address] [phone]            Jul 19, 2017  8:40 AM   FOLLOW UP with Torres Kumar M.D.   [facility] (--)    [address] [place] [address] [address]  [place] [address] [phone]            Aug 11, 2017 10:00 AM   Follow Up Visit with Sandoval Fox M.D.   [facility] (--)    [address]  [place] [address] [phone]           You will be receiving a confirmation call a few days before your appointment from our automated call confirmation system.              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 Low  9/18/2010 - Present    Neurogenic bladder N31.9 Low  4/2/2011 - Present    Sinus tachycardia (Chronic) R00.0 High  10/31/2013 - Present    Knee pain, right M25.561 Low  2/13/2014 - Present    Anxiety F41.9 Low  12/16/2014 - Present    Fatty liver disease, nonalcoholic K76.0 Low  1/19/2015 -  Present    Progressive focal motor weakness M62.81 Low  6/28/2015 - Present    Hypothyroidism (Chronic) E03.9 Low  11/23/2015 - Present    PCOS (polycystic ovarian syndrome) E28.2 Low  11/23/2015 - Present    H/O prior ablation treatment Z98.890   2/10/2016 - Present    Peripheral neuropathy (CMS-HCC) (Chronic) G62.9   3/6/2016 - Present    TONYA on CPAP G47.33 Low  3/7/2016 - Present    Morbidly obese (CMS-HCC) E66.01   3/7/2016 - Present    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   3/7/2016 - Present    Scoliosis M41.9   3/7/2016 - Present    GERD (gastroesophageal reflux disease) (Chronic) K21.9   3/7/2016 - Present    Vitamin D deficiency E55.9   5/21/2016 - Present    Chronic inflammatory arthritis (Chronic) M19.90 Medium  5/23/2016 - Present    Right flank pain R10.9   6/6/2016 - Present    Weakness of both lower extremities M62.81   6/22/2016 - Present    Elevated sedimentation rate R70.0   6/27/2016 - Present    Galactorrhea O92.6   7/22/2016 - Present    Psychosis, schizophrenia, simple (HCC) (Chronic) F20.89 Low Chronic 9/29/2016 - Present    Schizophrenia (CMS-HCC) F20.9 Low  10/27/2016 - Present    Paralysis (CMS-HCC) G83.9 High  10/27/2016 - Present    Chronic pain syndrome (Chronic) G89.4 Medium  10/27/2016 - Present    Suprapubic catheter (CMS-HCC) (Chronic) Z93.59 Low  10/27/2016 - Present    Bowel and bladder incontinence R32, R15.9   10/27/2016 - Present    Depression F32.9 Low  10/28/2016 - Present    HTN (hypertension) (Chronic) I10 Medium  11/1/2016 - Present    Hypovitaminosis D E55.9   11/29/2016 - Present    Leg weakness M62.81   1/4/2017 - Present    Weakness R53.1   1/22/2017 - Present    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 High  1/24/2017 - Present    Chronic suprapubic catheter (CMS-HCC) Z93.59   2/16/2017 - Present    Leg weakness, bilateral M62.81   2/22/2017 - Present    Weakness of right upper extremity M62.81   2/23/2017 - Present    Chest pain R07.9   3/30/2017 - Present       "  Health Maintenance        Date Due Completion Dates    IMM HEP A VACCINE (1 of 2 - Standard Series) 10/13/1990 ---    IMM VARICELLA (CHICKENPOX) VACCINE (1 of 2 - 2 Dose Adolescent Series) 10/13/2002 ---    PAP SMEAR 7/22/2019 7/22/2016 (Postponed), 2/19/2013 (N/S)    Override on 7/22/2016: Postponed (per pt was told could \"skip\" 2016)    Override on 2/19/2013: (N/S) ([facility])    IMM DTaP/Tdap/Td Vaccine (7 - Td) 8/6/2022 8/6/2012, 9/18/2010, 3/3/1994, 5/17/1991, 5/10/1990, 3/23/1990, 1990    COLONOSCOPY 9/25/2023 9/25/2013            Current Immunizations     Dtap Vaccine 3/3/1994, 5/17/1991, 5/10/1990, 3/23/1990, 1990    HIB Vaccine(PEDVAX) 1/11/1991    HPV Quadrivalent Vaccine (GARDASIL) 10/27/2011, 5/27/2011, 3/1/2011    Hepatitis B Vaccine Non-Recombivax (Ped/Adol) 1/28/2004, 10/28/2003, 10/29/1999    Influenza TIV (IM) 9/5/2013, 12/7/2011, 11/7/2011    Influenza Vaccine Adult HD 10/5/2016    MMR Vaccine 3/3/1994, 1/11/1991    OPV - Historical Data 3/3/1994, 5/17/1991, 5/10/1990, 3/23/1990, 1990    Pneumococcal Vaccine (PCV7) Historical Data 11/8/2015    Pneumococcal polysaccharide vaccine (PPSV-23) 1/23/2017  5:35 PM    TD Vaccine 9/18/2010  4:45 PM    Tdap Vaccine 8/6/2012      Below and/or attached are the medications your provider expects you to take. Review all of your home medications and newly ordered medications with your provider and/or pharmacist. Follow medication instructions as directed by your provider and/or pharmacist. Please keep your medication list with you and share with your provider. Update the information when medications are discontinued, doses are changed, or new medications (including over-the-counter products) are added; and carry medication information at all times in the event of emergency situations     Allergies:  CEFDINIR - Shortness of Breath,Itching     DEPAKOTE - Unspecified     ABILIFY - Unspecified     AMITRIPTYLINE - Unspecified     AMOXICILLIN - Rash     " CIPROFLOXACIN - Rash     CLINDAMYCIN - Nausea     DOXYCYCLINE - Vomiting,Nausea     EES - Vomiting,Nausea     FLAGYL - Unspecified     FLOMAX - Swelling     METFORMIN - Unspecified     SULFA DRUGS - Hives,Rash     TAPE - Rash     VANCOMYCIN - Itching     CEPHALEXIN - Rash     ERYTHROMYCIN - (reactions not documented)     LEVOFLOXACIN - Unspecified     METRONIDAZOLE - (reactions not documented)     VALPROIC ACID - (reactions not documented)               Medications  Valid as of: April 11, 2017 -  3:09 PM    Generic Name Brand Name Tablet Size Instructions for use    Albuterol Sulfate (Aero Soln) albuterol 108 (90 BASE) MCG/ACT Inhale 2 Puffs by mouth every 6 hours as needed for Shortness of Breath.        Aspirin (Tablet Delayed Response) ECOTRIN 81 MG Take 1 Tab by mouth every day.        Atorvastatin Calcium (Tab) LIPITOR 40 MG Take 1 Tab by mouth every bedtime.        Baclofen (Tab) LIORESAL 10 MG Take 10 mg by mouth 3 times a day.        Blood Glucose Monitoring Suppl (Misc) Blood Glucose Monitoring Suppl SUPPLIES Test strips order: Test strips for freestyle flass meter. Sig: use qday        Cefpodoxime Proxetil (Tab) VANTIN 200 MG         Ciprofloxacin HCl (Tab) CIPRO 500 MG Take 1 Tab by mouth every 12 hours for 14 days.        Ciprofloxacin HCl (Tab) CIPRO 500 MG Take 500 mg by mouth 2 times a day.        Cranberry (Cap) Cranberry 1000 MG Take 1 Cap by mouth 2 times a day, with meals.        Cyanocobalamin (Tab) vitamin b12 500 MCG Take 1,000 mcg by mouth 2 times a day.        Cyclobenzaprine HCl (Tab) FLEXERIL 10 MG Take 10 mg by mouth 2 Times a Day. Indications: Muscle Spasm        DiphenhydrAMINE HCl (Tab) BENADRYL 25 MG Take 25 mg by mouth 2 times a day. Taking with the Cipro to not have the reaction to Cipro of a rash        Ergocalciferol (Cap) DRISDOL 71009 UNITS Take 50,000 Units by mouth every Friday.        FentaNYL (PATCH 72 HR) DURAGESIC 25 MCG/HR Apply 1 Patch to skin as directed every 72 hours.          Fluconazole (Tab) DIFLUCAN 150 MG         FLUoxetine HCl (Cap) PROZAC 10 MG TAKE ONE CAPSULE BY MOUTH ONCE A DAY        Furosemide (Tab) LASIX 20 MG Take 20 mg by mouth every day.        Gabapentin (Tab) NEURONTIN 600 MG Take 600-1,200 mg by mouth 2 times a day. 600 mg AM   1200 mg PM        Gabapentin (Once-Daily) (Tab) GRALISE 600 MG Take 600 mg by mouth 3 times a day.        Ivabradine HCl (Tab) CORLANOR 5 MG Take 1 Tab by mouth 2 times a day, with meals.        Lactulose (Solution) lactulose 10 GM/15ML Take 10 g by mouth 2 times a day.        Levothyroxine Sodium (Tab) SYNTHROID 75 MCG Take 75 mcg by mouth Every morning on an empty stomach.        Liraglutide (Solution Pen-injector) VICTOZA 18 MG/3ML Inject 0.2 mL as instructed every day.        Melatonin (Tab) Melatonin 5 MG Take 10 mg by mouth every bedtime.        Misc. Devices (Misc) Misc. Devices  victoza needles to use QD        Nitroglycerin (SL Tab) NITROSTAT 0.4 MG Place 1 Tab under tongue as needed for Chest Pain.        Nystatin (Powder) MYCOSTATIN  Apply 1 Application to affected area(s) 3 times a day.        Omeprazole (CAPSULE DELAYED RELEASE) PRILOSEC 20 MG Take 20 mg by mouth 2 times a day.        OxyCODONE HCl (Tab) ROXICODONE 5 MG Take 1 Tab by mouth every four hours as needed.        Oxycodone-Acetaminophen (Tab) PERCOCET-10  MG Take  by mouth.        PredniSONE (Tab) DELTASONE 10 MG         Promethazine HCl (Tab) PHENERGAN 25 MG Take 1 Tab by mouth every 6 hours as needed for Nausea/Vomiting.        RaNITidine HCl (Tab) ZANTAC 150 MG Take 150 mg by mouth 2 times a day.        Sodium Bicarbonate (Tab) sodium bicarbonate 325 MG Take 2 Tabs by mouth 3 times a day.        Ziprasidone HCl (Cap) GEODON 80 MG Take 160 mg by mouth every evening. DO NOT GIVE PAST 1700        .                 Medicines prescribed today were sent to:     [facility] [id number] - [place], [place] - [address]    [address] [place] [address]    Phone:  [phone] Fax: [phone]    Open 24 Hours?: No      Medication refill instructions:       If your prescription bottle indicates you have medication refills left, it is not necessary to call your provider’s office. Please contact your pharmacy and they will refill your medication.    If your prescription bottle indicates you do not have any refills left, you may request refills at any time through one of the following ways: The online [employer] system (except Urgent Care), by calling your provider’s office, or by asking your pharmacy to contact your provider’s office with a refill request. Medication refills are processed only during regular business hours and may not be available until the next business day. Your provider may request additional information or to have a follow-up visit with you prior to refilling your medication.   *Please Note: Medication refills are assigned a new Rx number when refilled electronically. Your pharmacy may indicate that no refills were authorized even though a new prescription for the same medication is available at the pharmacy. Please request the medicine by name with the pharmacy before contacting your provider for a refill.        Other Notes About Your Plan     DME:  [facility] / ph [phone] / fax [phone]              [email or web address] Access Code: Activation code not generated  Current [employer] Status: Active

## 2017-04-12 ENCOUNTER — RESOLUTE PROFESSIONAL BILLING HOSPITAL PROF FEE (OUTPATIENT)
Dept: HOSPITALIST | Facility: MEDICAL CENTER | Age: 27
End: 2017-04-12
Payer: MEDICARE

## 2017-04-12 ENCOUNTER — APPOINTMENT (OUTPATIENT)
Dept: BEHAVIORAL HEALTH | Facility: PHYSICIAN GROUP | Age: 27
End: 2017-04-12
Payer: MEDICARE

## 2017-04-12 ENCOUNTER — TELEPHONE (OUTPATIENT)
Dept: NEUROLOGY | Facility: MEDICAL CENTER | Age: 27
End: 2017-04-12

## 2017-04-12 ENCOUNTER — HOSPITAL ENCOUNTER (INPATIENT)
Facility: MEDICAL CENTER | Age: 27
LOS: 2 days | DRG: 052 | End: 2017-04-16
Attending: EMERGENCY MEDICINE | Admitting: INTERNAL MEDICINE
Payer: MEDICARE

## 2017-04-12 DIAGNOSIS — G83.9 PARALYSIS (HCC): ICD-10-CM

## 2017-04-12 PROBLEM — G82.50 QUADRIPARESIS (HCC): Status: ACTIVE | Noted: 2017-04-12

## 2017-04-12 LAB
ALBUMIN SERPL BCP-MCNC: 4.6 G/DL (ref 3.2–4.9)
ALBUMIN/GLOB SERPL: 1.9 G/DL
ALP SERPL-CCNC: 75 U/L (ref 30–99)
ALT SERPL-CCNC: 94 U/L (ref 2–50)
ANION GAP SERPL CALC-SCNC: 14 MMOL/L (ref 0–11.9)
AST SERPL-CCNC: 86 U/L (ref 12–45)
BASOPHILS # BLD AUTO: 0.5 % (ref 0–1.8)
BASOPHILS # BLD: 0.03 K/UL (ref 0–0.12)
BILIRUB SERPL-MCNC: 0.7 MG/DL (ref 0.1–1.5)
BUN SERPL-MCNC: 11 MG/DL (ref 8–22)
CALCIUM SERPL-MCNC: 9.9 MG/DL (ref 8.5–10.5)
CHLORIDE SERPL-SCNC: 102 MMOL/L (ref 96–112)
CO2 SERPL-SCNC: 19 MMOL/L (ref 20–33)
CREAT SERPL-MCNC: 0.67 MG/DL (ref 0.5–1.4)
EOSINOPHIL # BLD AUTO: 0.23 K/UL (ref 0–0.51)
EOSINOPHIL NFR BLD: 3.7 % (ref 0–6.9)
ERYTHROCYTE [DISTWIDTH] IN BLOOD BY AUTOMATED COUNT: 45.9 FL (ref 35.9–50)
GFR SERPL CREATININE-BSD FRML MDRD: >60 ML/MIN/1.73 M 2
GLOBULIN SER CALC-MCNC: 2.4 G/DL (ref 1.9–3.5)
GLUCOSE BLD-MCNC: 152 MG/DL (ref 65–99)
GLUCOSE SERPL-MCNC: 115 MG/DL (ref 65–99)
HCG SERPL QL: NEGATIVE
HCT VFR BLD AUTO: 38.5 % (ref 37–47)
HGB BLD-MCNC: 13 G/DL (ref 12–16)
IMM GRANULOCYTES # BLD AUTO: 0.02 K/UL (ref 0–0.11)
IMM GRANULOCYTES NFR BLD AUTO: 0.3 % (ref 0–0.9)
LYMPHOCYTES # BLD AUTO: 2.48 K/UL (ref 1–4.8)
LYMPHOCYTES NFR BLD: 39.6 % (ref 22–41)
MCH RBC QN AUTO: 30.7 PG (ref 27–33)
MCHC RBC AUTO-ENTMCNC: 33.8 G/DL (ref 33.6–35)
MCV RBC AUTO: 91 FL (ref 81.4–97.8)
MONOCYTES # BLD AUTO: 0.4 K/UL (ref 0–0.85)
MONOCYTES NFR BLD AUTO: 6.4 % (ref 0–13.4)
NEUTROPHILS # BLD AUTO: 3.1 K/UL (ref 2–7.15)
NEUTROPHILS NFR BLD: 49.5 % (ref 44–72)
NRBC # BLD AUTO: 0 K/UL
NRBC BLD AUTO-RTO: 0 /100 WBC
PLATELET # BLD AUTO: 202 K/UL (ref 164–446)
PMV BLD AUTO: 11.6 FL (ref 9–12.9)
POTASSIUM SERPL-SCNC: 3.7 MMOL/L (ref 3.6–5.5)
PROT SERPL-MCNC: 7 G/DL (ref 6–8.2)
RBC # BLD AUTO: 4.23 M/UL (ref 4.2–5.4)
SODIUM SERPL-SCNC: 135 MMOL/L (ref 135–145)
VIT B12 SERPL-MCNC: 784 PG/ML (ref 211–911)
WBC # BLD AUTO: 6.3 K/UL (ref 4.8–10.8)

## 2017-04-12 PROCEDURE — 82607 VITAMIN B-12: CPT

## 2017-04-12 PROCEDURE — 85025 COMPLETE CBC W/AUTO DIFF WBC: CPT

## 2017-04-12 PROCEDURE — 36415 COLL VENOUS BLD VENIPUNCTURE: CPT

## 2017-04-12 PROCEDURE — 700111 HCHG RX REV CODE 636 W/ 250 OVERRIDE (IP): Performed by: EMERGENCY MEDICINE

## 2017-04-12 PROCEDURE — 665998 HH PPS REVENUE CREDIT

## 2017-04-12 PROCEDURE — 96361 HYDRATE IV INFUSION ADD-ON: CPT

## 2017-04-12 PROCEDURE — 96365 THER/PROPH/DIAG IV INF INIT: CPT

## 2017-04-12 PROCEDURE — G0378 HOSPITAL OBSERVATION PER HR: HCPCS

## 2017-04-12 PROCEDURE — 665999 HH PPS REVENUE DEBIT

## 2017-04-12 PROCEDURE — 84703 CHORIONIC GONADOTROPIN ASSAY: CPT

## 2017-04-12 PROCEDURE — 304562 HCHG STAT O2 MASK/CANNULA

## 2017-04-12 PROCEDURE — 82962 GLUCOSE BLOOD TEST: CPT

## 2017-04-12 PROCEDURE — A9270 NON-COVERED ITEM OR SERVICE: HCPCS | Performed by: INTERNAL MEDICINE

## 2017-04-12 PROCEDURE — 700102 HCHG RX REV CODE 250 W/ 637 OVERRIDE(OP): Performed by: INTERNAL MEDICINE

## 2017-04-12 PROCEDURE — 700111 HCHG RX REV CODE 636 W/ 250 OVERRIDE (IP): Performed by: INTERNAL MEDICINE

## 2017-04-12 PROCEDURE — 80053 COMPREHEN METABOLIC PANEL: CPT

## 2017-04-12 PROCEDURE — 99285 EMERGENCY DEPT VISIT HI MDM: CPT

## 2017-04-12 PROCEDURE — 99220 PR INITIAL OBSERVATION CARE,LEVL III: CPT | Performed by: INTERNAL MEDICINE

## 2017-04-12 PROCEDURE — 93005 ELECTROCARDIOGRAM TRACING: CPT | Performed by: EMERGENCY MEDICINE

## 2017-04-12 PROCEDURE — 84443 ASSAY THYROID STIM HORMONE: CPT

## 2017-04-12 PROCEDURE — 700105 HCHG RX REV CODE 258: Performed by: EMERGENCY MEDICINE

## 2017-04-12 RX ORDER — BACLOFEN 10 MG/1
10 TABLET ORAL 3 TIMES DAILY
Status: DISCONTINUED | OUTPATIENT
Start: 2017-04-12 | End: 2017-04-16 | Stop reason: HOSPADM

## 2017-04-12 RX ORDER — FAMOTIDINE 20 MG/1
20 TABLET, FILM COATED ORAL 2 TIMES DAILY
Status: DISCONTINUED | OUTPATIENT
Start: 2017-04-12 | End: 2017-04-16 | Stop reason: HOSPADM

## 2017-04-12 RX ORDER — BISACODYL 10 MG
10 SUPPOSITORY, RECTAL RECTAL
Status: DISCONTINUED | OUTPATIENT
Start: 2017-04-12 | End: 2017-04-16

## 2017-04-12 RX ORDER — PROMETHAZINE HYDROCHLORIDE 25 MG/1
12.5-25 SUPPOSITORY RECTAL EVERY 4 HOURS PRN
Status: DISCONTINUED | OUTPATIENT
Start: 2017-04-12 | End: 2017-04-16 | Stop reason: HOSPADM

## 2017-04-12 RX ORDER — OXYCODONE AND ACETAMINOPHEN 10; 325 MG/1; MG/1
2 TABLET ORAL EVERY 6 HOURS
Status: DISCONTINUED | OUTPATIENT
Start: 2017-04-12 | End: 2017-04-13

## 2017-04-12 RX ORDER — FENTANYL 25 UG/1
1 PATCH TRANSDERMAL
Status: DISCONTINUED | OUTPATIENT
Start: 2017-04-12 | End: 2017-04-16 | Stop reason: HOSPADM

## 2017-04-12 RX ORDER — ERGOCALCIFEROL 1.25 MG/1
50000 CAPSULE ORAL
Status: DISCONTINUED | OUTPATIENT
Start: 2017-04-14 | End: 2017-04-16 | Stop reason: HOSPADM

## 2017-04-12 RX ORDER — ONDANSETRON 2 MG/ML
4 INJECTION INTRAMUSCULAR; INTRAVENOUS EVERY 4 HOURS PRN
Status: DISCONTINUED | OUTPATIENT
Start: 2017-04-12 | End: 2017-04-16 | Stop reason: HOSPADM

## 2017-04-12 RX ORDER — ATORVASTATIN CALCIUM 40 MG/1
40 TABLET, FILM COATED ORAL
Status: DISCONTINUED | OUTPATIENT
Start: 2017-04-12 | End: 2017-04-16 | Stop reason: HOSPADM

## 2017-04-12 RX ORDER — FLUOXETINE 10 MG/1
10 CAPSULE ORAL DAILY
Status: DISCONTINUED | OUTPATIENT
Start: 2017-04-13 | End: 2017-04-16 | Stop reason: HOSPADM

## 2017-04-12 RX ORDER — CIPROFLOXACIN 500 MG/1
500 TABLET, FILM COATED ORAL EVERY 12 HOURS
Status: DISCONTINUED | OUTPATIENT
Start: 2017-04-12 | End: 2017-04-12

## 2017-04-12 RX ORDER — LEVOTHYROXINE SODIUM 0.07 MG/1
75 TABLET ORAL
Status: DISCONTINUED | OUTPATIENT
Start: 2017-04-13 | End: 2017-04-16 | Stop reason: HOSPADM

## 2017-04-12 RX ORDER — POLYETHYLENE GLYCOL 3350 17 G/17G
1 POWDER, FOR SOLUTION ORAL
Status: DISCONTINUED | OUTPATIENT
Start: 2017-04-12 | End: 2017-04-16

## 2017-04-12 RX ORDER — METHYLPREDNISOLONE SODIUM SUCCINATE 500 MG/8ML
1000 INJECTION INTRAMUSCULAR; INTRAVENOUS ONCE
Status: DISCONTINUED | OUTPATIENT
Start: 2017-04-12 | End: 2017-04-12

## 2017-04-12 RX ORDER — NITROGLYCERIN 0.4 MG/1
0.4 TABLET SUBLINGUAL PRN
Status: DISCONTINUED | OUTPATIENT
Start: 2017-04-12 | End: 2017-04-16 | Stop reason: HOSPADM

## 2017-04-12 RX ORDER — AMOXICILLIN 250 MG
2 CAPSULE ORAL 2 TIMES DAILY
Status: DISCONTINUED | OUTPATIENT
Start: 2017-04-12 | End: 2017-04-16

## 2017-04-12 RX ORDER — SODIUM BICARBONATE 650 MG/1
650 TABLET ORAL 3 TIMES DAILY
Status: DISCONTINUED | OUTPATIENT
Start: 2017-04-12 | End: 2017-04-16 | Stop reason: HOSPADM

## 2017-04-12 RX ORDER — ALBUTEROL SULFATE 90 UG/1
2 AEROSOL, METERED RESPIRATORY (INHALATION) EVERY 6 HOURS PRN
Status: DISCONTINUED | OUTPATIENT
Start: 2017-04-12 | End: 2017-04-16 | Stop reason: HOSPADM

## 2017-04-12 RX ORDER — ONDANSETRON 4 MG/1
4 TABLET, ORALLY DISINTEGRATING ORAL EVERY 4 HOURS PRN
Status: DISCONTINUED | OUTPATIENT
Start: 2017-04-12 | End: 2017-04-16 | Stop reason: HOSPADM

## 2017-04-12 RX ORDER — DIPHENHYDRAMINE HCL 25 MG
25 TABLET ORAL 2 TIMES DAILY
Status: DISCONTINUED | OUTPATIENT
Start: 2017-04-12 | End: 2017-04-12

## 2017-04-12 RX ORDER — FUROSEMIDE 20 MG/1
20 TABLET ORAL DAILY
Status: DISCONTINUED | OUTPATIENT
Start: 2017-04-13 | End: 2017-04-16 | Stop reason: HOSPADM

## 2017-04-12 RX ORDER — GABAPENTIN 300 MG/1
600 CAPSULE ORAL 3 TIMES DAILY
Status: DISCONTINUED | OUTPATIENT
Start: 2017-04-12 | End: 2017-04-12

## 2017-04-12 RX ORDER — GABAPENTIN 600 MG/1
600 TABLET ORAL 3 TIMES DAILY
Status: DISCONTINUED | OUTPATIENT
Start: 2017-04-12 | End: 2017-04-16 | Stop reason: HOSPADM

## 2017-04-12 RX ORDER — GABAPENTIN 600 MG/1
600 TABLET ORAL 3 TIMES DAILY
COMMUNITY
End: 2017-05-17

## 2017-04-12 RX ORDER — HEPARIN SODIUM 5000 [USP'U]/ML
5000 INJECTION, SOLUTION INTRAVENOUS; SUBCUTANEOUS EVERY 8 HOURS
Status: DISCONTINUED | OUTPATIENT
Start: 2017-04-12 | End: 2017-04-16 | Stop reason: HOSPADM

## 2017-04-12 RX ORDER — SODIUM CHLORIDE 9 MG/ML
1000 INJECTION, SOLUTION INTRAVENOUS ONCE
Status: COMPLETED | OUTPATIENT
Start: 2017-04-12 | End: 2017-04-12

## 2017-04-12 RX ORDER — CHOLECALCIFEROL (VITAMIN D3) 125 MCG
1000 CAPSULE ORAL 2 TIMES DAILY
Status: DISCONTINUED | OUTPATIENT
Start: 2017-04-12 | End: 2017-04-16 | Stop reason: HOSPADM

## 2017-04-12 RX ORDER — LABETALOL HYDROCHLORIDE 5 MG/ML
10 INJECTION, SOLUTION INTRAVENOUS EVERY 4 HOURS PRN
Status: DISCONTINUED | OUTPATIENT
Start: 2017-04-12 | End: 2017-04-16 | Stop reason: HOSPADM

## 2017-04-12 RX ORDER — CHOLECALCIFEROL (VITAMIN D3) 125 MCG
10 CAPSULE ORAL
Status: DISCONTINUED | OUTPATIENT
Start: 2017-04-12 | End: 2017-04-12

## 2017-04-12 RX ORDER — ZIPRASIDONE HYDROCHLORIDE 80 MG/1
160 CAPSULE ORAL
Status: DISCONTINUED | OUTPATIENT
Start: 2017-04-12 | End: 2017-04-16 | Stop reason: HOSPADM

## 2017-04-12 RX ORDER — ACETAMINOPHEN 325 MG/1
650 TABLET ORAL EVERY 6 HOURS PRN
Status: DISCONTINUED | OUTPATIENT
Start: 2017-04-12 | End: 2017-04-16 | Stop reason: HOSPADM

## 2017-04-12 RX ORDER — OMEPRAZOLE 20 MG/1
20 CAPSULE, DELAYED RELEASE ORAL 2 TIMES DAILY WITH MEALS
Status: DISCONTINUED | OUTPATIENT
Start: 2017-04-12 | End: 2017-04-16 | Stop reason: HOSPADM

## 2017-04-12 RX ORDER — PROMETHAZINE HYDROCHLORIDE 25 MG/1
12.5-25 TABLET ORAL EVERY 4 HOURS PRN
Status: DISCONTINUED | OUTPATIENT
Start: 2017-04-12 | End: 2017-04-16 | Stop reason: HOSPADM

## 2017-04-12 RX ORDER — PROMETHAZINE HYDROCHLORIDE 25 MG/1
25 TABLET ORAL EVERY 6 HOURS PRN
Status: DISCONTINUED | OUTPATIENT
Start: 2017-04-12 | End: 2017-04-12

## 2017-04-12 RX ADMIN — SODIUM CHLORIDE 1000 MG: 9 INJECTION, SOLUTION INTRAVENOUS at 16:15

## 2017-04-12 RX ADMIN — HEPARIN SODIUM 5000 UNITS: 5000 INJECTION, SOLUTION INTRAVENOUS; SUBCUTANEOUS at 20:01

## 2017-04-12 RX ADMIN — NYSTATIN 1 APPLICATION: 100000 POWDER TOPICAL at 22:05

## 2017-04-12 RX ADMIN — BACLOFEN 10 MG: 10 TABLET ORAL at 19:47

## 2017-04-12 RX ADMIN — OXYCODONE HYDROCHLORIDE AND ACETAMINOPHEN 2 TABLET: 10; 325 TABLET ORAL at 19:45

## 2017-04-12 RX ADMIN — CYANOCOBALAMIN TAB 500 MCG 1000 MCG: 500 TAB at 19:46

## 2017-04-12 RX ADMIN — ZIPRASIDONE HYDROCHLORIDE 160 MG: 80 CAPSULE ORAL at 22:02

## 2017-04-12 RX ADMIN — INSULIN LISPRO 2 UNITS: 100 INJECTION, SOLUTION INTRAVENOUS; SUBCUTANEOUS at 20:03

## 2017-04-12 RX ADMIN — SODIUM CHLORIDE 1000 ML: 9 INJECTION, SOLUTION INTRAVENOUS at 15:00

## 2017-04-12 RX ADMIN — OXYCODONE HYDROCHLORIDE AND ACETAMINOPHEN 2 TABLET: 10; 325 TABLET ORAL at 23:50

## 2017-04-12 RX ADMIN — OMEPRAZOLE 20 MG: 20 CAPSULE, DELAYED RELEASE ORAL at 19:46

## 2017-04-12 RX ADMIN — ATORVASTATIN CALCIUM 40 MG: 40 TABLET, FILM COATED ORAL at 19:47

## 2017-04-12 RX ADMIN — STANDARDIZED SENNA CONCENTRATE AND DOCUSATE SODIUM 2 TABLET: 8.6; 5 TABLET, FILM COATED ORAL at 19:46

## 2017-04-12 RX ADMIN — FAMOTIDINE 20 MG: 20 TABLET, FILM COATED ORAL at 19:47

## 2017-04-12 RX ADMIN — SODIUM BICARBONATE TAB 650 MG 650 MG: 650 TAB at 22:03

## 2017-04-12 ASSESSMENT — PAIN SCALES - GENERAL
PAINLEVEL_OUTOF10: 8
PAINLEVEL_OUTOF10: 8

## 2017-04-12 NOTE — ED PROVIDER NOTES
"ED Provider Note    CHIEF COMPLAINT  Chief Complaint   Patient presents with   • Numbness     Per pt, unable to move lower extremities and right arm. Pt states, \"I can't move my legs or right arm\". Pt denies tingling.        HPI  Kristin Balderrama is a 26 y.o. female who presents with a chief complaint to me \"I can't move either of my legs or my right arm. This is been getting worse over a couple days, very bad today. She said similar episodes multiple times in the past. Apparently she has had a complete neurological workup, falls with Dr. Stevenson. She typically gets steroids, for a few days, and then is discharged back to home or to rehab. She denies any fever or chills. No chest pain or shortness of breath. She has a suprapubic catheter secondary to urinary retention. No changes here. No fever. Patient's chart is reviewed, she was recently in hospital with urosepsis. Cultures all were negative however. She says she is feeling better from that. There is no other complaint.    PAST MEDICAL HISTORY  Past Medical History   Diagnosis Date   • Scoliosis    • Multiple personality disorder    • Chronic UTI 9/18/2010   • Heart burn    • Pain 08-15-12     back, 7/10   • History of falling    • Sinus tachycardia 10/31/2013   • Urinary incontinence    • Hypertension    • Disorder of thyroid    • Obesity    • Pneumonia 2012   • ASTHMA      when around smoke   • Breath shortness      with tachycardia   • Anginal syndrome      random chest pain especially with tachycardia   • Psychosis    • Arthritis      osteo   • PCOS (polycystic ovarian syndrome)    • Gynecological disorder      PCOS   • Renal disorder      \"kidney disease, stage 1\" nephrologist, Dr. Vallejo   • Arrhythmia      \"sinus tachycardia\", cariologist, Dr. 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   • Sleep apnea      CPAP \"pulmonary doctor took me off mid year 2016\"   • " 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CMS-HCC)    • Fall        FAMILY HISTORY  Family History   Problem Relation Age of Onset   • Hypertension Mother    • Sleep Apnea Mother    • Heart Disease Mother    • Other Mother      hypothryod   • Hypertension Maternal Uncle    • Heart Disease Maternal Grandmother    • Hypertension Maternal Grandmother    • Other Sister      Narcolepsy;fibromyalsia;bone;nerve   • Genitourinary () Sister      endometriosis       SOCIAL HISTORY  Social History   Substance Use Topics   • Smoking status: Passive Smoke Exposure - Never Smoker     Types: Cigarettes   • Smokeless tobacco: Never Used   • Alcohol Use: No         SURGICAL HISTORY  Past Surgical History   Procedure Laterality Date   • Neuro dest facet l/s w/ig sngl  4/21/2015     Performed by Reza Tabor at SURGERY [facility] ORS   • Recovery  1/27/2016     Procedure: CATH LAB EP STUDY WITH SINUS NODE MODIFICATION ABHINAV;  Surgeon: Recoveryonly Surgery;  Location: SURGERY PRE-POST PROC UNIT [facility];  Service:    • Katie by laparoscopy  8/29/2010     Performed by SHAYY JOHNS at [facility]   • Lumbar fusion anterior  8/21/2012     Performed by SUSIE SAWANT at [facility]   • Other cardiac surgery  1/2016     cardiac ablation   • Tonsillectomy       tonsillectomy   • Bowel stimulator insertion  7/13/2016     Procedure: BOWEL STIMULATOR INSERTION FOR PERMANENT INTERSTIM SACRAL IMPLANT;  Surgeon: Joe Noyola M.D.;  Location: [facility];  Service:    • Gastroscopy with balloon dilatation N/A 1/4/2017     Procedure: GASTROSCOPY WITH DILATATION;  Surgeon: Torres Vargas M.D.;  Location: [facility];  Service:    • Muscle biopsy Right 1/26/2017     Procedure: MUSCLE BIOPSY - THIGH;  Surgeon: Isidro Vigil M.D.;  Location: [facility];  Service:    • Other abdominal surgery         CURRENT MEDICATIONS  Home Medications     Reviewed by Ulisses Noyola (Pharmacy Tech) on 04/12/17 at  1605  Med List Status: Complete    Medication Last Dose Status    albuterol 108 (90 BASE) MCG/ACT Aero Soln inhalation aerosol 4/12/2017 Active    aspirin EC (ECOTRIN) 81 MG Tablet Delayed Response 4/12/2017 Active    atorvastatin (LIPITOR) 40 MG Tab 4/12/2017 Active    baclofen (LIORESAL) 10 MG Tab 4/12/2017 Active    ciprofloxacin (CIPRO) 500 MG Tab 4/12/2017 Active    Cranberry 1000 MG Cap 4/12/2017 Active    cyanocobalamin (HM VITAMIN B12) 500 MCG tablet 4/12/2017 Active    diphenhydrAMINE (BENADRYL) 25 MG Tab 4/12/2017 Active    fentanyl (DURAGESIC) 25 MCG/HR PATCH 72 HR 4/12/2017 Active    fluoxetine (PROZAC) 10 MG Cap 4/12/2017 Active    furosemide (LASIX) 20 MG Tab 4/12/2017 Active    Gabapentin, Once-Daily, (GRALISE) 600 MG Tab 4/12/2017 Active    ivabradine (CORLANOR) 5 MG Tab tablet 4/12/2017 Active    lactulose 10 GM/15ML Solution 4/12/2017 Active    levothyroxine (SYNTHROID) 75 MCG Tab 4/12/2017 Active    liraglutide (VICTOZA) 18 MG/3ML Solution Pen-injector injection 4/12/2017 Active    Melatonin 5 MG Tab 4/11/2017 Active    nitroglycerin (NITROSTAT) 0.4 MG SL Tab unknown Active    nystatin (MYCOSTATIN) Powder 4/12/2017 Active    omeprazole (PRILOSEC) 20 MG delayed-release capsule 4/12/2017 Active    oxycodone-acetaminophen (PERCOCET-10)  MG Tab 4/12/2017 Active    promethazine (PHENERGAN) 25 MG Tab unknown Active    ranitidine (ZANTAC) 150 MG Tab 4/12/2017 Active    sodium bicarbonate 325 MG Tab 4/12/2017 Active    vitamin D, Ergocalciferol, (DRISDOL) 49346 UNITS Cap capsule 4/7/2017 Active    ziprasidone (GEODON) 80 MG Cap 4/12/2017 Active                I have reviewed the nurses notes and/or the list brought with the patient.    ALLERGIES  Allergies   Allergen Reactions   • Cefdinir Shortness of Breath and Itching     Tolerated 1/18/17   • Depakote [Divalproex Sodium] Unspecified     Muscle spasms/muscle pain and weakness     • Abilify Unspecified     Headaches/muscle twitching     •  "Amitriptyline Unspecified     Headaches     • Amoxicillin Rash     Pt states \"I get a rash\".     • Ciprofloxacin Rash     Pt states \"I get a rash\".     • Clindamycin Nausea     Even with food     • Doxycycline Vomiting and Nausea     RXN=unknown   • Ees [Erythromycin] Vomiting and Nausea   • Flagyl [Metronidazole Hcl] Unspecified     \"eye problems\"     • Flomax [Tamsulosin Hydrochloride] Swelling   • Metformin Unspecified     Increased lactic acid      • Sulfa Drugs Hives and Rash     RXN=since childhood   • Tape Rash     Tears skin off   coban with Tegaderm tape ok  RXN=ongoing   • Vancomycin Itching     Pt becomes flushed in face and chest.   RXN=7/10/16   • Cephalexin [Keflex] Rash     Pt states she gets a rash with this medication   • Erythromycin    • Levofloxacin Unspecified     Leg muscle cramps   • Metronidazole    • Valproic Acid        REVIEW OF SYSTEMS  See HPI for further details. Review of systems as above, otherwise all other systems are negative.     PHYSICAL EXAM  VITAL SIGNS: /55 mmHg  Pulse 79  Temp(Src) 36.1 °C (97 °F)  Resp 20  Ht 1.6 m (5' 3\")  Wt 113.399 kg (250 lb)  BMI 44.30 kg/m2  SpO2 95%  Constitutional: Well appearing patient in no acute distress.  Not toxic, nor ill in appearance.  HENT: Mucus membranes moist.  Oropharynx is clear.  Eyes: Pupils equally round.  No scleral icterus.   Neck: Full nontender range of motion.  Lymphatic: No cervical lymphadenopathy noted.   Cardiovascular: Regular heart rate and rhythm.  No murmurs, rubs, nor gallop appreciated.   Thorax & Lungs: Chest is nontender.  Lungs are clear to auscultation with good air movement bilaterally.  No wheeze, rhonchi, nor rales.   : Suprapubic catheter  Abdomen: Soft, with no tenderness, rebound nor guarding.  No mass, pulsatile mass, nor hepatosplenomegaly appreciated.  Skin: No purpura nor petechia noted.  Extremities/Musculoskeletal: No sign of trauma.  Calves are nontender with no cords nor edema.  No " Dhiraj's sign.  Pulses are intact all around.   Neurologic: Alert & oriented. She is paralyzed in the right upper and both lower extremities. Cranial nerves normal.  Psychiatric: Normal affect appropriate for the clinical situation.  LABS  Labs Reviewed   COMP METABOLIC PANEL - Abnormal; Notable for the following:     Co2 19 (*)     Anion Gap 14.0 (*)     Glucose 115 (*)     AST(SGOT) 86 (*)     ALT(SGPT) 94 (*)     All other components within normal limits   ACCU-CHEK GLUCOSE - Abnormal; Notable for the following:     Glucose - Accu-Ck 152 (*)     All other components within normal limits   CBC WITH DIFFERENTIAL   HCG QUAL SERUM   VITAMIN B12   ESTIMATED GFR       MEDICAL RECORD  I have reviewed patient's medical record and pertinent results are listed above.    COURSE & MEDICAL DECISION MAKING  I have reviewed any medical record information, laboratory studies and radiographic results as noted above.  Patient presents with paralysis or lower extremities or right upper extremity. Paralysis happened many times before, she has had a stroke workups. A neurological evaluation according to my review of previous discharge summaries and H&P's. There is some question whether this could be conversion disorder. Clearly her symptoms do not fit with an ischemic pattern in terms of vascular distribution and she is not given TPA/alteplase/thrombolytics. Case discussed with Dr. Shaffer, she is admitted.    FINAL IMPRESSION  1. Paralysis (CMS-[facility])           This dictation was created using voice recognition software.    Electronically signed by: Ming Hidalgo, 4/12/2017 10:56 PM

## 2017-04-12 NOTE — IP AVS SNAPSHOT
" Home Care Instructions                                                                                                                  Name:Kristin Balderrama  Medical Record Number:[id number]  CSN: [id number]    YOB: 1990   Age: 26 y.o.  Sex: female  HT:1.6 m (5' 3\") WT: 122.7 kg (270 lb 8.1 oz)          Admit Date: 4/12/2017     Discharge Date:   Today's Date: 4/16/2017  Attending Doctor:  Estevan Hogan M.D.                  Allergies:  Cefdinir; Depakote; Abilify; Amitriptyline; Amoxicillin; Ciprofloxacin; Clindamycin; Doxycycline; Ees; Flagyl; Flomax; Metformin; Sulfa drugs; Tape; Vancomycin; Cephalexin; Erythromycin; Levofloxacin; Metronidazole; and Valproic acid            Discharge Instructions       Activity: As tolerated.   Diet: diabetic   Followup:   -PCP 1 week   -[facility], as scheduled   -Neurology 2-3 weeks   -Diabetic education     Instructions:   --Check your blood sugar four times daily before meals and before bed, keep a log and take this to your PCP   -Eat a diabetic diet (carb controlled)   -Do not take insulin if your blood sugar is below 120 or if you are unable to eat   -Check your blood sugar if you feel weak, dizzy, or poor. If your blood sugar is below 70 take juice or food, and recheck. If unable to take juice/food, take glucose tablets. Recheck blood sugar after and return to the ER if still low. If unconscious, family should call 911.     Diabetic Education/Counseling Resource, call for appointment:   1. [email or web address] [phone]   Located at [address], [address]   2. [facility] [phone]   Located at [address], [address]   -Given instructions to return to the ER if any new or worsening symptoms, worsening condition, or failure to improve   -Call PCP for followup   -No smoking, no alcohol, no caffeine   -Encourage risk factor reduction with tobacco and alcohol abstinence, diet changes, weight loss, and " exercise.      Discharge Instructions    Discharged to home by car with relative. Discharged via wheelchair, hospital escort: Yes.  Special equipment needed: Oxygen and Wheelchair HAS THIS AT HOME    Be sure to schedule a follow-up appointment with your primary care doctor or any specialists as instructed.     Discharge Plan:   Influenza Vaccine Indication: Not indicated: Previously immunized this influenza season and > 8 years of age    I understand that a diet low in cholesterol, fat, and sodium is recommended for good health. Unless I have been given specific instructions below for another diet, I accept this instruction as my diet prescription.   Other diet: DIABETIC DIET     Special Instructions: None    · Is patient discharged on Warfarin / Coumadin?   No     · Is patient Post Blood Transfusion?  No        Depression / Suicide Risk    As you are discharged from this [facility] facility, it is important to learn how to keep safe from harming yourself.    Recognize the warning signs:  · Abrupt changes in personality, positive or negative- including increase in energy   · Giving away possessions  · Change in eating patterns- significant weight changes-  positive or negative  · Change in sleeping patterns- unable to sleep or sleeping all the time   · Unwillingness or inability to communicate  · Depression  · Unusual sadness, discouragement and loneliness  · Talk of wanting to die  · Neglect of personal appearance   · Rebelliousness- reckless behavior  · Withdrawal from people/activities they love  · Confusion- inability to concentrate     If you or a loved one observes any of these behaviors or has concerns about self-harm, here's what you can do:  · Talk about it- your feelings and reasons for harming yourself  · Remove any means that you might use to hurt yourself (examples: pills, rope, extension cords, firearm)  · Get professional help from the community (Mental Health, Substance Abuse, psychological  counseling)  · Do not be alone:Call your Safe Contact- someone whom you trust who will be there for you.  · Call your local CRISIS HOTLINE 741-3014 or 367-081-8497  · Call your local Children's Mobile Crisis Response Team [place] (388) 617-4177 or www.AgentPair  · Call the toll free National Suicide Prevention Hotlines   · National Suicide Prevention Lifeline 292-387-DRWU (3514)  · National Hope Line Network 800-SUICIDE (524-8940)          Your appointments     May 01, 2017  9:30 AM   Follow Up Med Management with Ronny Burnette M.D.   BEHAVIORAL HEALTH [address] ([facility])    [address]  [address]  [place] [address]   [phone]            May 01, 2017 11:20 AM   FOLLOW UP with Torres Kumar M.D.   [facility] (--)    [address]  [place] [id number] [phone]            May 16, 2017  1:20 PM   New Patient with Chencho Norman M.D.   [facility] (--)    [address]  [place] [address] [phone]           Please bring enclosed paperwork completed along with your insurance card and photo ID.            May 31, 2017  9:00 AM   Individual Therapy with Blanca Brantley L.C.S.W.   [facility]    [address]  [address]  [place] [address] [phone]            Jul 17, 2017 10:00 AM   Individual Therapy with Blanca Brantley L.C.S.W.   [facility] ([facility]    [address]  [address]  [place] [address] [phone]            Jul 19, 2017  8:40 AM   FOLLOW UP with Torres Kumar M.D.   [facility] (--)    [address]  [place] [address]   [phone]            Aug 11, 2017 10:00 AM   Follow Up Visit with Sandoval Fox M.D.   [facility] Neurology (--)    [address], [address]  [place] [address]   [phone]           You will be receiving a confirmation call a few days before your  appointment from our automated call confirmation system.              Follow-up Information     1. Follow up with Torres Brody M.D.. Schedule an appointment as soon as possible for a visit in 1 week.    Specialty:  Internal Medicine    Contact information    [address] [address] [phone]          2. Schedule an appointment as soon as possible for a visit with [facility].    Why:  For home PT/OT        3. Follow up with Diabetic education.    Contact information    Diabetic Education/Counseling Resource, call for appointment:  1. [facility] [facility] [phone]   Located at [address], [address]  2. [facility] [phone]   Located at [address], [address]         Discharge Medication Instructions:    Below are the medications your physician expects you to take upon discharge:    Review all your home medications and newly ordered medications with your doctor and/or pharmacist. Follow medication instructions as directed by your doctor and/or pharmacist.    Please keep your medication list with you and share with your physician.               Medication List      CHANGE how you take these medications        Instructions    Morning Afternoon Evening Bedtime    liraglutide 18 MG/3ML Sopn injection   What changed:  how much to take   Last time this was given:  0.6 mg on 4/16/2017  9:39 AM   Commonly known as:  VICTOZA   Next Dose Due:  Due tomorrow am        Inject 0.2 mL as instructed every day.   Dose:  1.2 mg                          CONTINUE taking these medications        Instructions    Morning Afternoon Evening Bedtime    albuterol 108 (90 BASE) MCG/ACT Aers inhalation aerosol   Last time this was given:  2 Puffs on 4/14/2017  4:33 PM   Next Dose Due:  Use prn (as needed) for shortness of breath (SOB)        Inhale 2 Puffs by mouth every 6 hours as needed for Shortness of Breath.   Dose:  2 Puff                        aspirin EC  81 MG Tbec   Last time this was given:  81 mg on 4/16/2017  9:30 AM   Commonly known as:  ECOTRIN   Next Dose Due:  Due tomorrow am        Take 1 Tab by mouth every day.   Dose:  81 mg                        atorvastatin 40 MG Tabs   Last time this was given:  40 mg on 4/15/2017 10:08 PM   Commonly known as:  LIPITOR   Next Dose Due:  Due tonight at bedtime        Take 1 Tab by mouth every bedtime.   Dose:  40 mg                        baclofen 10 MG Tabs   Last time this was given:  10 mg on 4/16/2017  9:30 AM   Commonly known as:  LIORESAL   Next Dose Due:  Due again at 3pm today and then at bedtime        Take 10 mg by mouth 3 times a day.   Dose:  10 mg                        BENADRYL 25 MG Tabs   Generic drug:  diphenhydrAMINE        Take 25 mg by mouth 2 times a day. Taking with the Cipro to not have the reaction to Cipro of a rash   Dose:  25 mg                        ciprofloxacin 500 MG Tabs   Commonly known as:  CIPRO   Next Dose Due:  Check with prescribing MD before resuming or taking this med again        Take 1 Tab by mouth every 12 hours for 14 days.   Dose:  500 mg                        Cranberry 1000 MG Caps   Next Dose Due:  Take as you were before admission        Take 1 Cap by mouth 2 times a day, with meals.   Dose:  1 Cap                        fentanyl 25 MCG/HR Pt72   Last time this was given:  1 Patch on 4/15/2017  5:18 PM   Commonly known as:  DURAGESIC   Next Dose Due:  Due to be replace on Tuesday the 18 around 5 pm        Apply 1 Patch to skin as directed every 72 hours.   Dose:  1 Patch                        fluoxetine 10 MG Caps   Last time this was given:  10 mg on 4/16/2017  9:30 AM   Commonly known as:  PROZAC   Next Dose Due:  Due tomorrow am        TAKE ONE CAPSULE BY MOUTH ONCE A DAY                        furosemide 20 MG Tabs   Last time this was given:  20 mg on 4/16/2017  9:30 AM   Commonly known as:  LASIX   Next Dose Due:  Due tomorrow am        Take 20 mg by mouth every  day.   Dose:  20 mg                        GRALISE 600 MG Tabs   Last time this was given:  600 mg on 4/16/2017  9:32 AM   Generic drug:  Gabapentin (Once-Daily)   Next Dose Due:  Due again today at 3pm and then at bedtime        Take 600 mg by mouth 3 times a day.   Dose:  600 mg                        HM VITAMIN B12 500 MCG tablet   Last time this was given:  1,000 mcg on 4/16/2017  9:31 AM   Generic drug:  cyanocobalamin   Next Dose Due:  Take again tonight at bedtime        Take 1,000 mcg by mouth 2 times a day.   Dose:  1000 mcg                        ivabradine 5 MG Tabs tablet   Last time this was given:  5 mg on 4/16/2017  7:30 AM   Commonly known as:  CORLANOR   Next Dose Due:  Due with dinner today        Take 1 Tab by mouth 2 times a day, with meals.   Dose:  5 mg                        lactulose 10 GM/15ML Soln   Last time this was given:  15 mL on 4/16/2017  9:30 AM   Next Dose Due:  Take again today at with dinner        Take 10 g by mouth 2 times a day.   Dose:  10 g                        levothyroxine 75 MCG Tabs   Last time this was given:  75 mcg on 4/16/2017  6:10 AM   Commonly known as:  SYNTHROID   Next Dose Due:  Due tomorrow am on empty stomach        Take 75 mcg by mouth Every morning on an empty stomach.   Dose:  75 mcg                        Melatonin 5 MG Tabs   Next Dose Due:  Take as you were taking this at home        Doctor's comments:  Takes two tabs at Bedtime (10 mg.)   Take 10 mg by mouth every bedtime.   Dose:  10 mg                        nitroglycerin 0.4 MG Subl   Commonly known as:  NITROSTAT   Next Dose Due:  Take only as needed for chest pain        Place 1 Tab under tongue as needed for Chest Pain.   Dose:  0.4 mg                        nystatin Powd   Last time this was given:  1 Application on 4/15/2017  9:00 PM   Commonly known as:  MYCOSTATIN   Next Dose Due:  Due again this afternoon and at bedtime        Apply 1 Application to affected area(s) 3 times a day.   Dose:   1 Application                        omeprazole 20 MG delayed-release capsule   Last time this was given:  20 mg on 4/16/2017  9:30 AM   Commonly known as:  PRILOSEC   Next Dose Due:  Due again at bedtime        Take 20 mg by mouth 2 times a day.   Dose:  20 mg                        oxycodone-acetaminophen  MG Tabs   Last time this was given:  2 Tabs on 4/16/2017 11:40 AM   Commonly known as:  PERCOCET-10   Next Dose Due:  Take only as needed for pain every 6 hours next dose may be taken at 540pm        Take 2 Tabs by mouth every 6 hours.   Dose:  2 Tab                        promethazine 25 MG Tabs   Commonly known as:  PHENERGAN   Next Dose Due:  Take as needed to nausea        Take 1 Tab by mouth every 6 hours as needed for Nausea/Vomiting.   Dose:  25 mg                        ranitidine 150 MG Tabs   Commonly known as:  ZANTAC   Next Dose Due:  Take as you were taking this at home        Take 150 mg by mouth 2 times a day.   Dose:  150 mg                        sodium bicarbonate 325 MG Tabs   Last time this was given:  650 mg on 4/16/2017  9:30 AM   Next Dose Due:  Take again at 3pm and then at bedtime        Take 2 Tabs by mouth 3 times a day.   Dose:  650 mg                        vitamin D (Ergocalciferol) 63138 UNITS Caps capsule   Last time this was given:  50,000 Units on 4/14/2017  8:05 AM   Commonly known as:  DRISDOL   Next Dose Due:  Due again Friday the 21st of April        Take 50,000 Units by mouth every Friday.   Dose:  75282 Units                        ziprasidone 80 MG Caps   Last time this was given:  160 mg on 4/15/2017  5:18 PM   Commonly known as:  GEODON   Next Dose Due:  Take with dinner tonight before 5 pm        Take 160 mg by mouth every evening. DO NOT GIVE PAST 1700   Dose:  160 mg                             Where to Get Your Medications      Information about where to get these medications is not yet available     ! Ask your nurse or doctor about these medications    -  liraglutide 18 MG/3ML Sopn injection            Orders for after discharge     REFERRAL TO HOME HEALTH    Complete by:  As directed    Home health will create and establish a plan of care             Instructions           Diet / Nutrition:    Follow any diet instructions given to you by your doctor or the dietician, including how much salt (sodium) you are allowed each day.    If you are overweight, talk to your doctor about a weight reduction plan.    Activity:    Remain physically active following your doctor's instructions about exercise and activity.    Rest often.     Any time you become even a little tired or short of breath, SIT DOWN and rest.    Worsening Symptoms:    Report any of the following signs and symptoms to the doctor's office immediately:    *Pain of jaw, arm, or neck  *Chest pain not relieved by medication                               *Dizziness or loss of consciousness  *Difficulty breathing even when at rest   *More tired than usual                                       *Bleeding drainage or swelling of surgical site  *Swelling of feet, ankles, legs or stomach                 *Fever (>100ºF)  *Pink or blood tinged sputum  *Weight gain (3lbs/day or 5lbs /week)           *Shock from internal defibrillator (if applicable)  *Palpitations or irregular heartbeats                *Cool and/or numb extremities    Stroke Awareness    Common Risk Factors for Stroke include:    Age  Atrial Fibrillation  Carotid Artery Stenosis  Diabetes Mellitus  Excessive alcohol consumption  High blood pressure  Overweight   Physical inactivity  Smoking    Warning signs and symptoms of a stroke include:    *Sudden numbness or weakness of the face, arm or leg (especially on one side of the body).  *Sudden confusion, trouble speaking or understanding.  *Sudden trouble seeing in one or both eyes.  *Sudden trouble walking, dizziness, loss of balance or coordination.Sudden severe headache with no known cause.    It is very  important to get treatment quickly when a stroke occurs. If you experience any of the above warning signs, call 911 immediately.                   Disclaimer         Quit Smoking / Tobacco Use:    I understand the use of any tobacco products increases my chance of suffering from future heart disease or stroke and could cause other illnesses which may shorten my life. Quitting the use of tobacco products is the single most important thing I can do to improve my health. For further information on smoking / tobacco cessation call a Toll Free Quit Line at 1-920.699.7418 (*National Cancer [place]) or 1-608.607.4222 (American Lung Association) or you can access the web based program at www.lungusa.org.    [place] Tobacco Users Help Line:  (263) 437-4069       Toll Free: 1-257.867.2066  Quit Tobacco Program [facility] (125)199-7927    Crisis Hotline:    [place] Crisis Hotline:  6-634-ZBGGCPM or 1-983.899.9336    [place] Crisis Hotline:    1-749.243.9086 or 926-844-9132    Discharge Survey:   Thank you for choosing [facility]. We hope we did everything we could to make your hospital stay a pleasant one. You may be receiving a phone survey and we would appreciate your time and participation in answering the questions. Your input is very valuable to us in our efforts to improve our service to our patients and their families.        My signature on this form indicates that:    1. I have reviewed and understand the above information.  2. My questions regarding this information have been answered to my satisfaction.  3. I have formulated a plan with my discharge nurse to obtain my prescribed medications for home.                  Disclaimer         __________________________________                     __________       ________                       Patient Signature                                                 Date                    Time

## 2017-04-12 NOTE — ED NOTES
IV methylprednisolone and fluids running at this time. Pt resting on [facility] awaiting bed assignment at this time.

## 2017-04-12 NOTE — ED NOTES
"Med rec complete per pt. Pt states she took all medications today including night time meds. She states was \"running behind on meds.\"  "

## 2017-04-12 NOTE — IP AVS SNAPSHOT
"[employer] Access Code: Activation code not generated  Current "[employer] Status: Active    [employer]  A secure, online tool to manage your health information     [employer] "[employer]® is a secure, online tool that connects you to your personalized health information from the privacy of your home -- day or night - making it very easy for you to manage your healthcare. Once the activation process is completed, you can even access your medical information using the "[employer] rocio, which is available for free in the Apple Rocio store or Google Play store.     "[employer] provides the following levels of access (as shown below):   My Chart Features   [facility] Primary Care Doctor [facility]  Specialists [facility]  Urgent  Care Non-[facility]  Primary Care  Doctor   Email your healthcare team securely and privately 24/7 X X X X   Manage appointments: schedule your next appointment; view details of past/upcoming appointments X      Request prescription refills. X      View recent personal medical records, including lab and immunizations X X X X   View health record, including health history, allergies, medications X X X X   Read reports about your outpatient visits, procedures, consult and ER notes X X X X   See your discharge summary, which is a recap of your hospital and/or ER visit that includes your diagnosis, lab results, and care plan. X X       How to register for "[employer]:  1. Go to  [email or web address].  2. Click on the Sign Up Now box, which takes you to the New Member Sign Up page. You will need to provide the following information:  a. Enter your "[employer] Access Code exactly as it appears at the top of this page. (You will not need to use this code after you’ve completed the sign-up process. If you do not sign up before the expiration date, you must request a new code.)   b. Enter your date of birth.   c. Enter your home email address.   d. Click Submit, and follow the next screen’s instructions.  3. Create a "[employer] ID. This will  be your [employer] login ID and cannot be changed, so think of one that is secure and easy to remember.  4. Create a [employer] password. You can change your password at any time.  5. Enter your Password Reset Question and Answer. This can be used at a later time if you forget your password.   6. Enter your e-mail address. This allows you to receive e-mail notifications when new information is available in [employer].  7. Click Sign Up. You can now view your health information.    For assistance activating your [employer] account, call [phone]

## 2017-04-12 NOTE — ED NOTES
"Pt bib EMS.   Chief Complaint   Patient presents with   • Numbness     Per pt, unable to move lower extremities and right arm. Pt states, \"I can't move my legs or right arm\". Pt denies tingling.      Pt placed on monitor. Chart up for ERP now.   "

## 2017-04-12 NOTE — IP AVS SNAPSHOT
4/16/2017    Kristin Balderrama  [address] [address]    Dear Kristin:    [facility] wants to ensure your discharge home is safe and you or your loved ones have had all of your questions answered regarding your care after you leave the hospital.    Below is a list of resources and contact information should you have any questions regarding your hospital stay, follow-up instructions, or active medical symptoms.    Questions or Concerns Regarding… Contact   Medical Questions Related to Your Discharge  (7 days a week, 8am-5pm) Contact a Nurse Care Coordinator   [phone]   Medical Questions Not Related to Your Discharge  (24 hours a day / 7 days a week)  Contact the Nurse Health Line   [phone]    Medications or Discharge Instructions Refer to your discharge packet   or contact your [facility] Primary Care Provider   [phone]   Follow-up Appointment(s) Schedule your appointment via [employer]   or contact Scheduling [phone]   Billing Review your statement via [employer]  or contact Billing [phone]   Medical Records Review your records via [employer]   or contact Medical Records [phone]     You may receive a telephone call within two days of discharge. This call is to make certain you understand your discharge instructions and have the opportunity to have any questions answered. You can also easily access your medical information, test results and upcoming appointments via the [employer] free online health management tool. You can learn more and sign up at [employer]/Broadersheet. For assistance setting up your [employer] account, please call [phone].    Once again, we want to ensure your discharge home is safe and that you have a clear understanding of any next steps in your care. If you have any questions or concerns, please do not hesitate to contact us, we are here for you. Thank you for choosing [facility] for your healthcare needs.    Sincerely,    Your [facility] Healthcare Team

## 2017-04-12 NOTE — IP AVS SNAPSHOT
" <p align=\"LEFT\"><IMG SRC=\"//EMRWB/blob$/Images/[email or web address]\" alt=\"Image\" WIDTH=\"50%\" HEIGHT=\"200\" BORDER=\"\"></p>                   Name:Kristin Balderrama  Medical Record Number:[id number]  CSN: [id number]    YOB: 1990   Age: 26 y.o.  Sex: female  HT:1.6 m (5' 3\") WT: 122.7 kg (270 lb 8.1 oz)          Admit Date: 4/12/2017     Discharge Date:   Today's Date: 4/16/2017  Attending Doctor:  Estevan Hogan M.D.                  Allergies:  Cefdinir; Depakote; Abilify; Amitriptyline; Amoxicillin; Ciprofloxacin; Clindamycin; Doxycycline; Ees; Flagyl; Flomax; Metformin; Sulfa drugs; Tape; Vancomycin; Cephalexin; Erythromycin; Levofloxacin; Metronidazole; and Valproic acid          Your appointments     May 01, 2017  9:30 AM   Follow Up Med Management with Ronny Burnette M.D.   BEHAVIORAL HEALTH [address] [place] [address])    [address] [facility] [facility]  [address]  [facility] [address]   [phone]            May 01, 2017 11:20 AM   FOLLOW UP with Torres Kumar M.D.   [facility] (--)    [address] [place] [address] [address]  [place] [address]   [phone]            May 16, 2017  1:20 PM   New Patient with [facility] (--)    [address] [place] [address] A  [place] [id number] [phone]           Please bring enclosed paperwork completed along with your insurance card and photo ID.            May 31, 2017  9:00 AM   Individual Therapy with Blanca Fercho, L.C.S.W.   [facility] ([facility])    [address] [facility]  [address]  [facility] [id number] [phone]            Jul 17, 2017 10:00 AM   Individual Therapy with Blanca Fercho, L.C.S.W.   [facility] ([facility])    [address] [facility]  [address]  [place] [address]   [phone]            Jul 19, 2017  8:40 AM   FOLLOW UP with Torres Kumar M.D.   [facility] (--)    [address] [place] [address] [address]  [place] [address]   [phone]            Aug 11, 2017 " 10:00 AM   Follow Up Visit with Sandoval Fox M.D.   [facility] (--)    [address], [address]  [facility] [address] [phone]           You will be receiving a confirmation call a few days before your appointment from our automated call confirmation system.              Follow-up Information     1. Follow up with Torres Brody M.D.. Schedule an appointment as soon as possible for a visit in 1 week.    Specialty:  Internal Medicine    Contact information    [address] [facility]  [address]  [facility] [address] [phone]          2. Schedule an appointment as soon as possible for a visit with [facility] health.    Why:  For home PT/OT        3. Follow up with Diabetic education.    Contact information    Diabetic Education/Counseling Resource, call for appointment:  1. [facility] [phone]   Located at [address], [address]  2. [facility] [phone]   Located at [address], [address]         Medication List      Take these Medications        Instructions    albuterol 108 (90 BASE) MCG/ACT Aers inhalation aerosol    Inhale 2 Puffs by mouth every 6 hours as needed for Shortness of Breath.   Dose:  2 Puff       aspirin EC 81 MG Tbec   Commonly known as:  ECOTRIN    Take 1 Tab by mouth every day.   Dose:  81 mg       atorvastatin 40 MG Tabs   Commonly known as:  LIPITOR    Take 1 Tab by mouth every bedtime.   Dose:  40 mg       baclofen 10 MG Tabs   Commonly known as:  LIORESAL    Take 10 mg by mouth 3 times a day.   Dose:  10 mg       BENADRYL 25 MG Tabs   Generic drug:  diphenhydrAMINE    Take 25 mg by mouth 2 times a day. Taking with the Cipro to not have the reaction to Cipro of a rash   Dose:  25 mg       ciprofloxacin 500 MG Tabs   Commonly known as:  CIPRO    Take 1 Tab by mouth every 12 hours for 14 days.   Dose:  500 mg       Cranberry 1000 MG Caps    Take 1 Cap by mouth 2 times a day, with meals.   Dose:  1 Cap        fentanyl 25 MCG/HR Pt72   Commonly known as:  DURAGESIC    Apply 1 Patch to skin as directed every 72 hours.   Dose:  1 Patch       fluoxetine 10 MG Caps   Commonly known as:  PROZAC    TAKE ONE CAPSULE BY MOUTH ONCE A DAY       furosemide 20 MG Tabs   Commonly known as:  LASIX    Take 20 mg by mouth every day.   Dose:  20 mg       GRALISE 600 MG Tabs   Generic drug:  Gabapentin (Once-Daily)    Take 600 mg by mouth 3 times a day.   Dose:  600 mg       HM VITAMIN B12 500 MCG tablet   Generic drug:  cyanocobalamin    Take 1,000 mcg by mouth 2 times a day.   Dose:  1000 mcg       ivabradine 5 MG Tabs tablet   Commonly known as:  CORLANOR    Take 1 Tab by mouth 2 times a day, with meals.   Dose:  5 mg       lactulose 10 GM/15ML Soln    Take 10 g by mouth 2 times a day.   Dose:  10 g       levothyroxine 75 MCG Tabs   Commonly known as:  SYNTHROID    Take 75 mcg by mouth Every morning on an empty stomach.   Dose:  75 mcg       liraglutide 18 MG/3ML Sopn injection   What changed:  how much to take   Commonly known as:  VICTOZA    Inject 0.2 mL as instructed every day.   Dose:  1.2 mg       Melatonin 5 MG Tabs    Doctor's comments:  Takes two tabs at Bedtime (10 mg.)   Take 10 mg by mouth every bedtime.   Dose:  10 mg       nitroglycerin 0.4 MG Subl   Commonly known as:  NITROSTAT    Place 1 Tab under tongue as needed for Chest Pain.   Dose:  0.4 mg       nystatin Powd   Commonly known as:  MYCOSTATIN    Apply 1 Application to affected area(s) 3 times a day.   Dose:  1 Application       omeprazole 20 MG delayed-release capsule   Commonly known as:  PRILOSEC    Take 20 mg by mouth 2 times a day.   Dose:  20 mg       oxycodone-acetaminophen  MG Tabs   Commonly known as:  PERCOCET-10    Take 2 Tabs by mouth every 6 hours.   Dose:  2 Tab       promethazine 25 MG Tabs   Commonly known as:  PHENERGAN    Take 1 Tab by mouth every 6 hours as needed for Nausea/Vomiting.   Dose:  25 mg       ranitidine 150 MG Tabs   Commonly  known as:  ZANTAC    Take 150 mg by mouth 2 times a day.   Dose:  150 mg       sodium bicarbonate 325 MG Tabs    Take 2 Tabs by mouth 3 times a day.   Dose:  650 mg       vitamin D (Ergocalciferol) 56637 UNITS Caps capsule   Commonly known as:  DRISDOL    Take 50,000 Units by mouth every Friday.   Dose:  15651 Units       ziprasidone 80 MG Caps   Commonly known as:  GEODON    Take 160 mg by mouth every evening. DO NOT GIVE PAST 1700   Dose:  160 mg

## 2017-04-12 NOTE — TELEPHONE ENCOUNTER
JOCELYNNI:    Pt's grandmother is calling and letting you know that Kristin is going to the Hospital ER right now via ambulance. She has lost all function of her harm and both legs again. Her grandmother wanted to make sure you knew. KA

## 2017-04-13 ENCOUNTER — HOME CARE VISIT (OUTPATIENT)
Dept: HOME HEALTH SERVICES | Facility: HOME HEALTHCARE | Age: 27
End: 2017-04-13
Payer: MEDICARE

## 2017-04-13 PROBLEM — Z93.59 CHRONIC SUPRAPUBIC CATHETER (HCC): Chronic | Status: ACTIVE | Noted: 2017-02-16

## 2017-04-13 PROBLEM — G82.50 TETRAPLEGIA (HCC): Status: ACTIVE | Noted: 2017-04-13

## 2017-04-13 PROBLEM — R79.89 ELEVATED LIVER FUNCTION TESTS: Status: ACTIVE | Noted: 2017-04-13

## 2017-04-13 PROBLEM — R73.9 HYPERGLYCEMIA: Status: ACTIVE | Noted: 2017-04-13

## 2017-04-13 LAB
ANION GAP SERPL CALC-SCNC: 12 MMOL/L (ref 0–11.9)
BASOPHILS # BLD AUTO: 0.2 % (ref 0–1.8)
BASOPHILS # BLD: 0.01 K/UL (ref 0–0.12)
BUN SERPL-MCNC: 12 MG/DL (ref 8–22)
CALCIUM SERPL-MCNC: 9.7 MG/DL (ref 8.5–10.5)
CHLORIDE SERPL-SCNC: 104 MMOL/L (ref 96–112)
CO2 SERPL-SCNC: 19 MMOL/L (ref 20–33)
CREAT SERPL-MCNC: 0.7 MG/DL (ref 0.5–1.4)
EOSINOPHIL # BLD AUTO: 0.01 K/UL (ref 0–0.51)
EOSINOPHIL NFR BLD: 0.2 % (ref 0–6.9)
ERYTHROCYTE [DISTWIDTH] IN BLOOD BY AUTOMATED COUNT: 43.6 FL (ref 35.9–50)
GFR SERPL CREATININE-BSD FRML MDRD: >60 ML/MIN/1.73 M 2
GLUCOSE BLD-MCNC: 219 MG/DL (ref 65–99)
GLUCOSE BLD-MCNC: 233 MG/DL (ref 65–99)
GLUCOSE BLD-MCNC: 233 MG/DL (ref 65–99)
GLUCOSE BLD-MCNC: 301 MG/DL (ref 65–99)
GLUCOSE SERPL-MCNC: 225 MG/DL (ref 65–99)
HCT VFR BLD AUTO: 37.7 % (ref 37–47)
HGB BLD-MCNC: 12.8 G/DL (ref 12–16)
IMM GRANULOCYTES # BLD AUTO: 0.02 K/UL (ref 0–0.11)
IMM GRANULOCYTES NFR BLD AUTO: 0.5 % (ref 0–0.9)
LYMPHOCYTES # BLD AUTO: 0.8 K/UL (ref 1–4.8)
LYMPHOCYTES NFR BLD: 19.1 % (ref 22–41)
MCH RBC QN AUTO: 30.8 PG (ref 27–33)
MCHC RBC AUTO-ENTMCNC: 34 G/DL (ref 33.6–35)
MCV RBC AUTO: 90.8 FL (ref 81.4–97.8)
MONOCYTES # BLD AUTO: 0.02 K/UL (ref 0–0.85)
MONOCYTES NFR BLD AUTO: 0.5 % (ref 0–13.4)
NEUTROPHILS # BLD AUTO: 3.32 K/UL (ref 2–7.15)
NEUTROPHILS NFR BLD: 79.5 % (ref 44–72)
NRBC # BLD AUTO: 0 K/UL
NRBC BLD AUTO-RTO: 0 /100 WBC
PLATELET # BLD AUTO: 203 K/UL (ref 164–446)
PMV BLD AUTO: 11.7 FL (ref 9–12.9)
POTASSIUM SERPL-SCNC: 4.8 MMOL/L (ref 3.6–5.5)
RBC # BLD AUTO: 4.15 M/UL (ref 4.2–5.4)
SODIUM SERPL-SCNC: 135 MMOL/L (ref 135–145)
TSH SERPL DL<=0.005 MIU/L-ACNC: 3.76 UIU/ML (ref 0.3–3.7)
WBC # BLD AUTO: 4.2 K/UL (ref 4.8–10.8)

## 2017-04-13 PROCEDURE — A9270 NON-COVERED ITEM OR SERVICE: HCPCS | Performed by: INTERNAL MEDICINE

## 2017-04-13 PROCEDURE — 665999 HH PPS REVENUE DEBIT

## 2017-04-13 PROCEDURE — 36415 COLL VENOUS BLD VENIPUNCTURE: CPT

## 2017-04-13 PROCEDURE — L4398 FOOT DROP SPLINT PRE OTS: HCPCS

## 2017-04-13 PROCEDURE — 700105 HCHG RX REV CODE 258: Performed by: INTERNAL MEDICINE

## 2017-04-13 PROCEDURE — 80048 BASIC METABOLIC PNL TOTAL CA: CPT

## 2017-04-13 PROCEDURE — 700102 HCHG RX REV CODE 250 W/ 637 OVERRIDE(OP): Performed by: INTERNAL MEDICINE

## 2017-04-13 PROCEDURE — 97162 PT EVAL MOD COMPLEX 30 MIN: CPT

## 2017-04-13 PROCEDURE — 82962 GLUCOSE BLOOD TEST: CPT

## 2017-04-13 PROCEDURE — 97166 OT EVAL MOD COMPLEX 45 MIN: CPT

## 2017-04-13 PROCEDURE — G8987 SELF CARE CURRENT STATUS: HCPCS | Mod: CJ

## 2017-04-13 PROCEDURE — 99225 PR SUBSEQUENT OBSERVATION CARE,LEVEL II: CPT | Performed by: INTERNAL MEDICINE

## 2017-04-13 PROCEDURE — 700111 HCHG RX REV CODE 636 W/ 250 OVERRIDE (IP): Performed by: INTERNAL MEDICINE

## 2017-04-13 PROCEDURE — G8979 MOBILITY GOAL STATUS: HCPCS | Mod: CI

## 2017-04-13 PROCEDURE — G8988 SELF CARE GOAL STATUS: HCPCS | Mod: CI

## 2017-04-13 PROCEDURE — G0378 HOSPITAL OBSERVATION PER HR: HCPCS

## 2017-04-13 PROCEDURE — 665998 HH PPS REVENUE CREDIT

## 2017-04-13 PROCEDURE — G8978 MOBILITY CURRENT STATUS: HCPCS | Mod: CK

## 2017-04-13 PROCEDURE — 85025 COMPLETE CBC W/AUTO DIFF WBC: CPT

## 2017-04-13 RX ORDER — OXYCODONE AND ACETAMINOPHEN 10; 325 MG/1; MG/1
2 TABLET ORAL EVERY 4 HOURS PRN
Status: DISCONTINUED | OUTPATIENT
Start: 2017-04-13 | End: 2017-04-16 | Stop reason: HOSPADM

## 2017-04-13 RX ADMIN — INSULIN LISPRO 6 UNITS: 100 INJECTION, SOLUTION INTRAVENOUS; SUBCUTANEOUS at 21:14

## 2017-04-13 RX ADMIN — ATORVASTATIN CALCIUM 40 MG: 40 TABLET, FILM COATED ORAL at 21:08

## 2017-04-13 RX ADMIN — STANDARDIZED SENNA CONCENTRATE AND DOCUSATE SODIUM 2 TABLET: 8.6; 5 TABLET, FILM COATED ORAL at 21:08

## 2017-04-13 RX ADMIN — FUROSEMIDE 20 MG: 20 TABLET ORAL at 08:48

## 2017-04-13 RX ADMIN — OXYCODONE HYDROCHLORIDE AND ACETAMINOPHEN 2 TABLET: 10; 325 TABLET ORAL at 21:09

## 2017-04-13 RX ADMIN — HEPARIN SODIUM 5000 UNITS: 5000 INJECTION, SOLUTION INTRAVENOUS; SUBCUTANEOUS at 05:31

## 2017-04-13 RX ADMIN — FAMOTIDINE 20 MG: 20 TABLET, FILM COATED ORAL at 21:08

## 2017-04-13 RX ADMIN — CYANOCOBALAMIN TAB 500 MCG 1000 MCG: 500 TAB at 08:47

## 2017-04-13 RX ADMIN — LEVOTHYROXINE SODIUM 75 MCG: 75 TABLET ORAL at 05:30

## 2017-04-13 RX ADMIN — OXYCODONE HYDROCHLORIDE AND ACETAMINOPHEN 2 TABLET: 10; 325 TABLET ORAL at 05:30

## 2017-04-13 RX ADMIN — NYSTATIN 1 APPLICATION: 100000 POWDER TOPICAL at 21:00

## 2017-04-13 RX ADMIN — OMEPRAZOLE 20 MG: 20 CAPSULE, DELAYED RELEASE ORAL at 05:31

## 2017-04-13 RX ADMIN — NYSTATIN 1 APPLICATION: 100000 POWDER TOPICAL at 08:57

## 2017-04-13 RX ADMIN — INSULIN LISPRO 3 UNITS: 100 INJECTION, SOLUTION INTRAVENOUS; SUBCUTANEOUS at 05:40

## 2017-04-13 RX ADMIN — OMEPRAZOLE 20 MG: 20 CAPSULE, DELAYED RELEASE ORAL at 16:13

## 2017-04-13 RX ADMIN — FLUOXETINE 10 MG: 10 CAPSULE ORAL at 08:48

## 2017-04-13 RX ADMIN — ONDANSETRON 4 MG: 4 TABLET, ORALLY DISINTEGRATING ORAL at 23:37

## 2017-04-13 RX ADMIN — FAMOTIDINE 20 MG: 20 TABLET, FILM COATED ORAL at 08:47

## 2017-04-13 RX ADMIN — BACLOFEN 10 MG: 10 TABLET ORAL at 14:22

## 2017-04-13 RX ADMIN — OXYCODONE HYDROCHLORIDE AND ACETAMINOPHEN 2 TABLET: 10; 325 TABLET ORAL at 16:13

## 2017-04-13 RX ADMIN — SODIUM BICARBONATE TAB 650 MG 650 MG: 650 TAB at 14:24

## 2017-04-13 RX ADMIN — SODIUM BICARBONATE TAB 650 MG 650 MG: 650 TAB at 08:49

## 2017-04-13 RX ADMIN — ZIPRASIDONE HYDROCHLORIDE 160 MG: 80 CAPSULE ORAL at 17:16

## 2017-04-13 RX ADMIN — ASPIRIN 81 MG: 81 TABLET ORAL at 08:47

## 2017-04-13 RX ADMIN — NYSTATIN 1 APPLICATION: 100000 POWDER TOPICAL at 15:00

## 2017-04-13 RX ADMIN — ONDANSETRON 4 MG: 2 INJECTION INTRAMUSCULAR; INTRAVENOUS at 09:31

## 2017-04-13 RX ADMIN — OXYCODONE HYDROCHLORIDE AND ACETAMINOPHEN 2 TABLET: 10; 325 TABLET ORAL at 11:32

## 2017-04-13 RX ADMIN — CYANOCOBALAMIN TAB 500 MCG 1000 MCG: 500 TAB at 21:08

## 2017-04-13 RX ADMIN — GABAPENTIN 600 MG: 600 TABLET ORAL at 08:48

## 2017-04-13 RX ADMIN — BACLOFEN 10 MG: 10 TABLET ORAL at 21:08

## 2017-04-13 RX ADMIN — IVABRADINE 5 MG: 5 TABLET, FILM COATED ORAL at 07:30

## 2017-04-13 RX ADMIN — IVABRADINE 5 MG: 5 TABLET, FILM COATED ORAL at 17:17

## 2017-04-13 RX ADMIN — GABAPENTIN 600 MG: 600 TABLET ORAL at 21:28

## 2017-04-13 RX ADMIN — HEPARIN SODIUM 5000 UNITS: 5000 INJECTION, SOLUTION INTRAVENOUS; SUBCUTANEOUS at 21:07

## 2017-04-13 RX ADMIN — BACLOFEN 10 MG: 10 TABLET ORAL at 08:47

## 2017-04-13 RX ADMIN — HEPARIN SODIUM 5000 UNITS: 5000 INJECTION, SOLUTION INTRAVENOUS; SUBCUTANEOUS at 14:25

## 2017-04-13 RX ADMIN — SODIUM BICARBONATE TAB 650 MG 650 MG: 650 TAB at 21:08

## 2017-04-13 RX ADMIN — INSULIN LISPRO 5 UNITS: 100 INJECTION, SOLUTION INTRAVENOUS; SUBCUTANEOUS at 12:59

## 2017-04-13 RX ADMIN — SODIUM CHLORIDE 1000 MG: 9 INJECTION, SOLUTION INTRAVENOUS at 09:00

## 2017-04-13 RX ADMIN — INSULIN LISPRO 3 UNITS: 100 INJECTION, SOLUTION INTRAVENOUS; SUBCUTANEOUS at 17:05

## 2017-04-13 RX ADMIN — STANDARDIZED SENNA CONCENTRATE AND DOCUSATE SODIUM 2 TABLET: 8.6; 5 TABLET, FILM COATED ORAL at 08:47

## 2017-04-13 RX ADMIN — GABAPENTIN 600 MG: 600 TABLET ORAL at 14:26

## 2017-04-13 ASSESSMENT — PAIN SCALES - GENERAL
PAINLEVEL_OUTOF10: 8
PAINLEVEL_OUTOF10: 9
PAINLEVEL_OUTOF10: 8
PAINLEVEL_OUTOF10: 9

## 2017-04-13 ASSESSMENT — ENCOUNTER SYMPTOMS
PALPITATIONS: 0
CHILLS: 0
SPUTUM PRODUCTION: 0
SHORTNESS OF BREATH: 0
DIZZINESS: 0
COUGH: 0
FOCAL WEAKNESS: 1
HEADACHES: 0
ABDOMINAL PAIN: 0
BACK PAIN: 0
VOMITING: 0
FEVER: 0
SENSORY CHANGE: 1
NAUSEA: 0

## 2017-04-13 ASSESSMENT — ACTIVITIES OF DAILY LIVING (ADL): TOILETING: OTHER (COMMENTS)

## 2017-04-13 ASSESSMENT — LIFESTYLE VARIABLES
ALCOHOL_USE: NO
EVER_SMOKED: NEVER

## 2017-04-13 ASSESSMENT — GAIT ASSESSMENTS: GAIT LEVEL OF ASSIST: UNABLE TO PARTICIPATE

## 2017-04-13 NOTE — THERAPY
"Physical Therapy Evaluation completed.   Bed Mobility:  Supine to Sit: Minimal Assist  Transfers: Sit to Stand: Minimal Assist  Gait: Level Of Assist: Unable to Participate with No Equipment Needed       Plan of Care: Will benefit from Physical Therapy 3 times per week  Discharge Recommendations: Equipment: Will Continue to Assess for Equipment Needs. Post-acute therapy Discharge to home with outpatient or home health for additional skilled therapy services.    Pt presents inconsistently with B LE strength. Formal testing indicated that pt without even trace contraction for gross LE muscle groups distally to proximally until hip flexors which formally tested as trace. However, in sitting, when pt scooted anteriorly, B LE toes/ankles/knees were noted to flex and bear weight for scoot. Once positioned in sitting, with anterior trunk lean, palpable contraction to B quads were noted. During stand pivot transfer, pt was able to bear weight through B LE without buckle and pivot with limited assist from PT. Will be seen to increase mobility/transfer abiltiy.     See \"Rehab Therapy-Acute\" Patient Summary Report for complete documentation.     "

## 2017-04-13 NOTE — PROGRESS NOTES
Patient arrived on floor from ED, VSS, skin with yeast rash in labia/vaginal folds. Blanchable redness present on buttocks.  States inability to move right arm and bilateral legs, does not withdrawn to pain on either side. Lungs CTA, HR regular, hypoactive BS in BLQ. Suprapubic cath present prior to admission, site CDI, draining clear dark yellow urine. States LBM this morning. Presently states biggest need is that she is very hungry. Diet ordered, message left for dietary services about pt transfer

## 2017-04-13 NOTE — THERAPY
"Occupational Therapy Evaluation completed.   Functional Status:  Min-mod A supine to sit.  Pt initially giving good effort to sit up trunk then suddenly just fell back and quit giving effort.  Pt able to sit EOB and wash face supervised, brush teeth with min A.  Pt reporting very little to no AROM/strength in RUE but pt observed moving UE including hand/fingers spontaneously, and did use RUE to push and support self with lateral scooting.  Pt stood and transferred to  with min A.  Pt left up in .  Plan of Care: Will benefit from Occupational Therapy 3 times per week  Discharge Recommendations:  Equipment: Will Continue to Assess for Equipment Needs. Post-acute therapy Discharge to home with outpatient or home health for additional skilled therapy services.    See \"Rehab Therapy-Acute\" Patient Summary Report for complete documentation.    "

## 2017-04-13 NOTE — PROGRESS NOTES
Hospital Medicine Progress Note, Adult, Complex               Author: Floridalma Taylor Date & Time created: 4/13/2017  11:12 AM     Interval History:  Hospital Course:  Kristin Balderrama is a 26 y.o. female w/ h/o DM, hypothyroidism, urinary retention with suprapubic catheter, CKD admitted 4/12/2017 for tetraplegia of RUE, BLE. She has had episodes of this in the past, with full workups that have remained elusive to the etiology. These episodes historically improve with IV steroids.     Today, the patient reports some improvement of her RUE, with minimal movement and twitching of the arm. BLE without movement, sensation reduced to RUE, BLE. Denies HA, denies life stressors or recent change.     Review of Systems:  Review of Systems   Constitutional: Negative for fever and chills.   Respiratory: Negative for cough, sputum production and shortness of breath.    Cardiovascular: Negative for chest pain and palpitations.   Gastrointestinal: Negative for nausea, vomiting and abdominal pain.   Genitourinary: Negative for dysuria.   Musculoskeletal: Negative for back pain.   Neurological: Positive for sensory change (BLE numb) and focal weakness (RUE and BLE unable to move). Negative for dizziness and headaches.   All other systems reviewed and are negative.      Physical Exam:  Physical Exam   Constitutional: She is oriented to person, place, and time. She appears well-developed and well-nourished. No distress.   Obese female lying in bed   HENT:   Head: Normocephalic and atraumatic.   Eyes: Conjunctivae are normal.   Neck: Normal range of motion. Neck supple.   Cardiovascular: Normal rate and regular rhythm.    No murmur heard.  Pulmonary/Chest: Effort normal and breath sounds normal. No respiratory distress.   Abdominal: Soft. Bowel sounds are normal. There is no tenderness.   Musculoskeletal: Normal range of motion. She exhibits no edema.   Neurological: She is alert and oriented to person, place, and time.   Neuro  Exam:  Alert, oriented  Patient reports no change to visual acuity, PEARRL   CN III-XII INTACT  Extremity Strength abnormal as below  Coordination abnormal as below  Romberg un testable due to inability to lift RUE  Gait un testable d/t inability to move BLE  RUE, BLE without movement  BLE with foot drop  Sensation limited RUE, BLE per patient report- reports numbness         Skin: Skin is warm and dry. No rash noted.   Vitals reviewed.      Labs:        Invalid input(s): [id number]      Recent Labs      17   023   SODIUM  135  135   POTASSIUM  3.7  4.8   CHLORIDE  102  104   CO2  19*  19*   BUN  11  12   CREATININE  0.67  0.70   CALCIUM  9.9  9.7     Recent Labs      17   023   ALTSGPT  94*   --    ASTSGOT  86*   --    ALKPHOSPHAT  75   --    TBILIRUBIN  0.7   --    GLUCOSE  115*  225*     Recent Labs      17   RBC  4.23  4.15*   HEMOGLOBIN  13.0  12.8   HEMATOCRIT  38.5  37.7   PLATELETCT  202  203     Recent Labs      17   0237   WBC  6.3  4.2*   NEUTSPOLYS  49.50  79.50*   LYMPHOCYTES  39.60  19.10*   MONOCYTES  6.40  0.50   EOSINOPHILS  3.70  0.20   BASOPHILS  0.50  0.20   ASTSGOT  86*   --    ALTSGPT  94*   --    ALKPHOSPHAT  75   --    TBILIRUBIN  0.7   --            Hemodynamics:  Temp (24hrs), Av.3 °C (97.4 °F), Min:36.1 °C (96.9 °F), Max:36.6 °C (97.8 °F)  Temperature: 36.1 °C (96.9 °F)  Pulse  Av.7  Min: 69  Max: 94Heart Rate (Monitored): 77  Blood Pressure: 120/52 mmHg, NIBP: 131/73 mmHg     Respiratory:    Respiration: 18, Pulse Oximetry: 94 %     Work Of Breathing / Effort: Mild (Pt on 2L nasal cannula/ baseline at home)  RUL Breath Sounds: Clear, RML Breath Sounds: Clear, RLL Breath Sounds: Diminished, MARY Breath Sounds: Clear, LLL Breath Sounds: Diminished  Fluids:    Intake/Output Summary (Last 24 hours) at 17 1112  Last data filed at 17 0500   Gross per 24 hour   Intake     "850 ml   Output    800 ml   Net     50 ml     Weight: 113.399 kg (250 lb)  GI/Nutrition:  Orders Placed This Encounter   Procedures   • Diet Order     Standing Status: Standing      Number of Occurrences: 1      Standing Expiration Date:      Order Specific Question:  Diet:     Answer:  Diabetic [3]     Medical Decision Making, by Problem:  Active Hospital Problems    Diagnosis   • *Tetraplegia (CMS-HCC) [G82.50]  -Has had full neuro workup OP, per Dr Santa note 4/17- \"CSF studies, neurophysiologic studies, imaging studies, etc. have all proven unremarkable. Even a muscle biopsy was nondiagnostic.\"  -Plan for possible repeat EMG studies OP  -MRI brain 11/15- neg  -CTA head/neck 1/17- neg  -Appreciate neurology reccs Dr Acosta  -Continue solumedrol 1G daily x 5 days  -PT/OT   • Hyperglycemia [R73.9]- suspect 2/2 steroid  -Trend  -ISS ACHS, cont victoza  -Glycohemoglobin 4/6 7.2   • Elevated liver function tests [R79.89]  -appears chronic, denies abdominal pain  -US abd 1/15- hepatic steatosis, no CBD stone or dilation, s/p delmar   • Psychosis, schizophrenia, simple (HCC) [F20.89]- cont prozac, geodon   • Hypothyroidism [E03.9]- cont synthroid   • Chronic UTI [N39.0]  -completed course of cipro  -Denies urinary complaints, no WBC/fever or signs of infection- continue to monitor   • Chronic suprapubic catheter (CMS-HCC) [Z93.59]- OP management   • HTN (hypertension) [I10]- continue lasix   • Chronic pain syndrome [G89.4]- continue OP regimen   • GERD (gastroesophageal reflux disease) [K21.9]- cont PPI     Foot drop  -Foot drop boot per ortho tech  -Alternate feet  -encourage to bring boot from home that is fitted for her right foot    Dispo: home once improved    Labs reviewed and Medications reviewed  Andrade catheter: No Andrade (suprapubic catheter)      DVT Prophylaxis: Enoxaparin (Lovenox)    Ulcer prophylaxis: Not indicated    Assessed for rehab: Patient was assess for and/or received rehabilitation services " during this hospitalization

## 2017-04-13 NOTE — DIETARY
NUTRITION SERVICES: BMI - Pt with BMI >40 (=44.29). Weight loss counseling not appropriate in acute care setting. RECOMMEND - Referral to outpatient nutrition services for weight management after D/C.

## 2017-04-13 NOTE — H&P
CHIEF COMPLAINT:  Right upper extremity, bilateral lower extremity weakness   and numbness.    HISTORY OF PRESENT ILLNESS:  This is a 26-year-old female with history of type   2 diabetes mellitus, with significant psychiatric history with multiple   personality disorder, schizophrenia and mood disorder, along with neurogenic   bladder with suprapubic catheter.  She had multiple admissions for fluctuating   quadriparesis of unclear etiology, and has underwent extensive workup   including MRI, EMG, muscle biopsy, and CSF analysis which all remained   unimpressive.  She did test positive for microsomal antibodies.  During   those admissions, her fluctuating quadriparesis improves with high-dose   steroid course.  She follows neurology as an outpatient, in fact has seen Dr. Fox on the day prior to admission.  She had recent admission for sepsis   secondary to urinary tract infection, and has completed outpatient course of   oral antibiotics.    When she was seen by Dr. Fox yesterday, neurology planned on starting her   on a methylprednisolone infusion once monthly for 4 months, with first dose on Monday and   repeat EMG and NCV on the 5th month.  However, functional etiology such as   conversion disorder was highly suspected.    Patient stated that about 2 days ago, she started to have recurrent weakness   and not feeling well.  Her right upper extremity and bilateral lower   extremities became very weak to the point that she is unable to move those   extremities, along with sensation of numbness.  The left upper extremity was a   little weak, but she was able to maintain ability to move the left upper   extremity without any numbness.  She did complain of nausea, but denied any   vomiting or abdominal pain.  She did not have any other symptoms such as chest   pain or shortness of breath.    EMERGENCY DEPARTMENT COURSE:  The patient was initially evaluated in the   emergency department, was maintaining good  "hemodynamics, saturating well on   room air, and was afebrile.  Initial blood workup for essentially unremarkable   with unimpressive CBC and BMP.  EKG (my read) showed normal sinus rhythm with   left ventricular hypertrophy without any acute infarct.  Patient was given   Solu-Medrol 1 g in the ED, along with IV fluids.  She was subsequently   admitted to the hospital service for further evaluation and management.      REVIEW OF SYSTEMS  A complete review of system was done. All other systems were negative.    PMH/PSH/FMH: I personally reviewed all ancillary histories as noted.    PAST MEDICAL HISTORY:  Past Medical History   Diagnosis Date   • Scoliosis    • Multiple personality disorder    • Chronic UTI 9/18/2010   • Heart burn    • Pain 08-15-12     back, 7/10   • History of falling    • Sinus tachycardia 10/31/2013   • Urinary incontinence    • Hypertension    • Disorder of thyroid    • Obesity    • Pneumonia 2012   • ASTHMA      when around smoke   • Breath shortness      with tachycardia   • Anginal syndrome      random chest pain especially with tachycardia   • Psychosis    • Arthritis      osteo   • PCOS (polycystic ovarian syndrome)    • Gynecological disorder      PCOS   • Renal disorder      \"kidney disease, stage 1\" nephrologist, Dr. Vallejo   • Arrhythmia      \"sinus tachycardia\", cariologist, Dr. 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   • Sleep apnea      CPAP \"pulmonary doctor took me off mid year 2016\"   • 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CMS-HCC)    • Fall        PAST SURGICAL HISTORY:  Past Surgical History   Procedure Laterality Date   • Neuro dest facet l/s w/ig sngl  4/21/2015     Performed by Reza Tabor at SUR[facility]ERY [facility]   • Recovery  1/27/2016     Procedure: CATH LAB EP STUDY WITH SINUS NODE MODIFICATION ABHINAV;  Surgeon: [facility];  Location: SURGERY PRE-POST PROC UNIT [facility];  Service:  "   • Katie by laparoscopy  8/29/2010     Performed by SHAYY JOHNS at SURGERY [facility]   • Lumbar fusion anterior  8/21/2012     Performed by SUSIE SAWANT at SURGERY [facility]   • Other cardiac surgery  1/2016     cardiac ablation   • Tonsillectomy       tonsillectomy   • Bowel stimulator insertion  7/13/2016     Procedure: BOWEL STIMULATOR INSERTION FOR PERMANENT INTERSTIM SACRAL IMPLANT;  Surgeon: Joe Noyola M.D.;  Location: SURGERY [facility];  Service:    • Gastroscopy with balloon dilatation N/A 1/4/2017     Procedure: GASTROSCOPY WITH DILATATION;  Surgeon: Torres Vargas M.D.;  Location: SURGERY [facility];  Service:    • Muscle biopsy Right 1/26/2017     Procedure: MUSCLE BIOPSY - THIGH;  Surgeon: Isidro Vigil M.D.;  Location: SURGERY [facility];  Service:    • Other abdominal surgery         PERSONAL/SOCIAL HISTORY:  Social History   Substance Use Topics   • Smoking status: Passive Smoke Exposure - Never Smoker     Types: Cigarettes   • Smokeless tobacco: Never Used   • Alcohol Use: No       FAMILY MEDICAL HISTORY:  Family History   Problem Relation Age of Onset   • Hypertension Mother    • Sleep Apnea Mother    • Heart Disease Mother    • Other Mother      hypothryod   • Hypertension Maternal Uncle    • Heart Disease Maternal Grandmother    • Hypertension Maternal Grandmother    • Other Sister      Narcolepsy;fibromyalsia;bone;nerve   • Genitourinary () Sister      endometriosis       ALLERGIES:  Cefdinir; Depakote; Abilify; Amitriptyline; Amoxicillin; Ciprofloxacin; Clindamycin; Doxycycline; Ees; Flagyl; Flomax; Metformin; Sulfa drugs; Tape; Vancomycin; Cephalexin; Erythromycin; Levofloxacin; Metronidazole; and Valproic acid    HOME MEDICATIONS:  Medication Sig   Gabapentin, Once-Daily, (GRALISE) 600 MG Tab Take 600 mg by mouth 3 times a day.   liraglutide (VICTOZA) 18 MG/3ML Solution Pen-injector injection Inject 0.6 mg as instructed every day.   ivabradine  (CORLANOR) 5 MG Tab tablet Take 1 Tab by mouth 2 times a day, with meals.   diphenhydrAMINE (BENADRYL) 25 MG Tab Take 25 mg by mouth 2 times a day. Taking with the Cipro to not have the reaction to Cipro of a rash   fluoxetine (PROZAC) 10 MG Cap TAKE ONE CAPSULE BY MOUTH ONCE A DAY   furosemide (LASIX) 20 MG Tab Take 20 mg by mouth every day.   ranitidine (ZANTAC) 150 MG Tab Take 150 mg by mouth 2 times a day.   baclofen (LIORESAL) 10 MG Tab Take 10 mg by mouth 3 times a day.   ciprofloxacin (CIPRO) 500 MG Tab Take 1 Tab by mouth every 12 hours for 14 days.   oxycodone-acetaminophen (PERCOCET-10)  MG Tab Take 2 Tabs by mouth every 6 hours.   nitroglycerin (NITROSTAT) 0.4 MG SL Tab Place 1 Tab under tongue as needed for Chest Pain.   nystatin (MYCOSTATIN) Powder Apply 1 Application to affected area(s) 3 times a day.   Cranberry 1000 MG Cap Take 1 Cap by mouth 2 times a day, with meals.   Melatonin 5 MG Tab Take 10 mg by mouth every bedtime.   atorvastatin (LIPITOR) 40 MG Tab Take 1 Tab by mouth every bedtime.   aspirin EC (ECOTRIN) 81 MG Tablet Delayed Response Take 1 Tab by mouth every day.   ziprasidone (GEODON) 80 MG Cap Take 160 mg by mouth every evening. DO NOT GIVE PAST 1700   fentanyl (DURAGESIC) 25 MCG/HR PATCH 72 HR Apply 1 Patch to skin as directed every 72 hours.   lactulose 10 GM/15ML Solution Take 10 g by mouth 2 times a day.   vitamin D, Ergocalciferol, (DRISDOL) 18250 UNITS Cap capsule Take 50,000 Units by mouth every Friday.   promethazine (PHENERGAN) 25 MG Tab Take 1 Tab by mouth every 6 hours as needed for Nausea/Vomiting.   albuterol 108 (90 BASE) MCG/ACT Aero Soln inhalation aerosol Inhale 2 Puffs by mouth every 6 hours as needed for Shortness of Breath.   cyanocobalamin (HM VITAMIN B12) 500 MCG tablet Take 1,000 mcg by mouth 2 times a day.   sodium bicarbonate 325 MG Tab Take 2 Tabs by mouth 3 times a day.   levothyroxine (SYNTHROID) 75 MCG Tab Take 75 mcg by mouth Every morning on an empty  stomach.   omeprazole (PRILOSEC) 20 MG delayed-release capsule Take 20 mg by mouth 2 times a day.           PHYSICAL EXAMINATION:  VITAL SIGNS:  Blood pressure 142/79, heart rate 90, respiratory rate 17,   oxygen saturation 99% on 2 L, temperature 36.4 degrees Celsius.  CONSTITUTIONAL:  Morbidly obese, not in cardiorespiratory distress.  HENT: Normocephalic, atraumatic, (-) tonsillopharyngal congestion or exudate.  EYES: PERRLA, pink conjuctivae, (-) icteric sclerae  NECK: (-) cervical lymphadenopathy, (-) neck rigidity  CARDIOVASCULAR: Distinct heart sounds, RRR, (-) murmurs, rubs, gallops, (-) LE edema  RESPIRATORY: Equal chest expansion, clear breath sounds, (-) crackles/wheezes/rales/rhonchi  GASTROINTESTINAL:  Normoactive bowel sounds.  Soft and nontender abdomen.    Suprapubic catheter present in the hypogastric area.  MUSCULOSKELETAL: (-) joint swelling/tenderness, (-) joint deformities, (-) muscle tenderness,   (-) gross limitation of movement of 4 extremities  SKIN: (-) erythema, warmth, rashes, ulcers, open wounds  NEUROLOGIC:  Motor is 5/5 on the left upper extremity, and 0/5 in the right   upper extremity and bilateral lower extremity.  Sensory is grossly intact on   the left upper extremity, with gross sensory deficit while light touch on the   right upper extremity and bilateral lower extremity.  No facial droop.  PSYCHIATRIC:  Pressured speech, animated mood.  Behavior, age appropriate.      PERTINENT DIAGNOSTIC RESULTS:  Reviewed, and as mentioned above. Please refer to ED course.        ASSESSMENT:  1.  Fluctuating quadriparesis of unknown etiology, with recurrent episode.  2.  Multiple psychiatric issues (multiple personality disorder, schizophrenia, mood disorders).  3.  Type 2 diabetes mellitus.  4.  Neurogenic bladder with suprapubic catheter in situ.  5.  Urinary tract infection, present on admission, completed antibiotics course.  6.  Morbid obesity.    PLAN:  ---  I will admit her to the  neurology floors.  ---  This is a fascinating case of fluctuating quadriparesis of unknown   etiology, with unremarkable extensive workup, always responding to high-dose   steroids.  I will start her on another course of high-dose steroids with   Solu-Medrol 1 g daily for 5 days, and monitor for clinical improvement.  We   will do neuro checks every 4 hours.  I spoke with neurology (Dr. Acosta),    as they are very familiar with her, and they will continue to follow her while   she is in house.  I do not think she needs any more diagnostics given her   extensive workup for the same condition.  I will defer further imaging for   now.  ---  Her home medications will be reconciled, and all her psychiatric   medications will be resumed including her Prozac and Geodon.  I will also   continue her chronic pain medications.  ---  I will resume her home dose Victoza, and she will be on high-dose   steroids.  I will start her on medium sliding scale lispro.  We will do   Accu-Cheks a.c. and at bedtime.  ---  She already completed a course of oral ciprofloxacin for her urinary tract   infection, and with her recent urinalysis being unimpressive, I will stop   this.        Deep venous thrombosis prophylaxis -- heparin subcutaneously.  Gastrointestinal prophylaxis -- Prilosec and ranitidine.  Code status -- full code.       ____________________________________     Marky Shaffer M.D.    TU / CHRISTELLE    DD:  04/12/2017 19:48:07  DT:  04/12/2017 20:34:20    D#:  [id number]  Job#:  [id number]

## 2017-04-13 NOTE — PROGRESS NOTES
Received report on pt, mother at bedside, pt states pain 8/10 scale to her lower back, meds given, no complaints of N/V, pt unable to move RUE, BLE, complains of numbness to those extremeties, LUE 4/5 movement, pt on 2L nasal cannula, states this is her baseline at home, suprapubic catheter in place draining clear bunny urine to gravity, turn q2h, bed alarm on and intact.

## 2017-04-13 NOTE — CONSULTS
DATE OF SERVICE:  04/12/2017    REQUESTING PHYSICIAN:  Internal medicine team.    REASON FOR CONSULTATION:  Paraplegia and right upper extremity weakness.    HISTORY OF PRESENT ILLNESS:  The patient is a 26-year-old right-handed female   with multiple medical problems to include, but not limited to, chronic kidney   disease stage I, history of arthritis, history of thyroid disease, history of   obesity who presented for evaluation of sudden onset of inability to move her   legs and right upper extremity.  She is followed by Dr. Fox at neurology   clinic and apparently has had extensive workup including muscle biopsy and no   clear diagnosis was made.  There is a strong suspicion for psychogenic   weakness in that case.  The patient tells me she has been having this episode   of body shut down.  She started having some weakness, which is mild and all of   a sudden her body shuts down and then she cannot move her leg or arm.    Sometimes she is completely quadriplegic, sometimes paraplegic, sometimes   tetraplegia like this presentation today.  She also tells me she is   incontinent of urine and bowel and she has spinal nerve stimulator and   suprapubic catheter placed.  She saw Dr. Fox yesterday in clinic and   apparently was supposed to get a steroid infusion.  She says without any clear   diagnosis, she responds to steroid treatment, usually 1 dose as an outpatient   and 3-5 doses as an inpatient.  She was also seen at, I believe [facility] where   muscle biopsy was obtained and there was no clear diagnosis.  Initially, there   was a question of mitochondrial disorder, but this was not confirmed.  She is   accompanied by her grandmother in the emergency room.    PAST MEDICAL HISTORY:  Significant for chronic kidney disease stage I,   hypothyroidism that she feels she has Hashimoto's thyroiditis, history of   unusual unspecified and unclear neurological dysfunction.    MEDICATIONS:  Reviewed as per  MAR.    ALLERGIES:  CEFDINIR, DEPAKOTE, ABILIFY, AMITRIPTYLINE, AMOXICILLIN,   CIPROFLOXACIN, CLINDAMYCIN, DOXYCYCLINE, FLAGYL, FLOMAX, METFORMIN, SULFA   DRUGS, TAPE, VANCOMYCIN, CEPHALEXIN, ERYTHROMYCIN, LEVOFLOXACIN, METRONIDAZOLE   AND VALPROIC ACID.    FAMILY HISTORY:  Reviewed and noncontributory.  Hypertension and   hypothyroidism runs in mother's side.  Sister has narcolepsy and fibromyalgia.    REVIEW OF SYSTEMS:  As above.  All other systems are normal.  Denies having   any fever, cough, syncopal episode.  No chest pain, no shortness of breath.    No visual impairment.  No headache.    PHYSICAL EXAMINATION:  VITAL SIGNS:  On examination today, heart rate 77, blood pressure 131/73,   respiration 17, O2 sat 97% on 2 liters nasal cannula, temperature 36.4.  NECK:  Supple, no carotid bruit.  CARDIOVASCULAR:  Regular rate.  LUNGS:  Clear.  ABDOMEN:  Soft.  EXTREMITIES:  No cyanosis or clubbing.  NEUROLOGIC:  She is awake, alert, fully oriented.  Speech and memory within   normal limits.  Facial motor and sensation intact.  Extraocular movements are   full.  Visual fields are full to confrontation.  Funduscopic exam was not   performed.  Hearing is intact to finger rub bilaterally.  Tongue midline and   protrudes symmetrically.  Palate elevates symmetrically.  Shoulder shrugs are   normal.  Motor examination revealed normal strength to direct testing of the   left upper extremity.  She is completely flaccid in both lower extremities and   right upper extremity.  Morris sign is positive.  She does not have any   effort to move any of her legs, so she is tetraplegic at this point.  Sensory   examination, she has no sensation in her legs from groin down.  She has no   sensation in her arm from shoulder down.  Sensation is normal in the left   upper extremity.  Deep tendon reflexes are diffusely diminished.  Plantar   reflexes are equivocal.    ASSESSMENT AND PLAN:  A 26-year-old female with unusual neurological    presentation, currently with tetraplegia involving bilateral lower extremities   and right upper extremity, etiology not clear, but likely nonorganic.  She is   here to receive high dose Solu-Medrol.  She will be evaluated by physical   therapy and occupational therapy.  She states after Solu-Medrol, she usually   regain her function.  At that point, she can be discharged to have followup   with her primary care physician and primary neurologist.       ____________________________________     MD ERICA Boateng / CHRISTELLE    DD:  04/12/2017 18:22:20  DT:  04/12/2017 19:15:08    D#:  [id number]  Job#:  [id number]

## 2017-04-13 NOTE — PROGRESS NOTES
NEUROLOGY PROGRESS NOTE      Background:  26 y.o. female was admitted on 4/12/2017  2:58 PM for Quadriparesis (CMS-[facility]).  She is being followed by Neurology for Tetraplegia.    SUBJECTIVE: start moving her Rt arm, leg weakness has also improved.    NEUROLOGICAL EXAM:   NECK:  Supple, no carotid bruit.  CARDIOVASCULAR:  Regular rate.  LUNGS:  Clear.  ABDOMEN:  Soft.  EXTREMITIES:  No cyanosis or clubbing.  NEUROLOGIC:  She is awake, alert, fully oriented.  Speech and memory within    normal limits.  Facial motor and sensation intact.  Extraocular movements are    full.  Visual fields are full to confrontation.  Funduscopic exam was not    performed.  Hearing is intact to finger rub bilaterally.  Tongue midline and    protrudes symmetrically.  Palate elevates symmetrically.  Shoulder shrugs are    normal.  Motor examination revealed normal strength to direct testing of the    left upper extremity.  She has 2/5 weakness in Rt upper and both lower ext. Sensory    examination, she has no sensation in her legs from groin down.  She has no    sensation in her arm from shoulder down.  Sensation is normal in the left    upper extremity.  Deep tendon reflexes are diffusely diminished.  Plantar    reflexes are equivocal.      OBJECTIVE:     MEDICATIONS:  Current Facility-Administered Medications   Medication Dose   • oxycodone-acetaminophen (PERCOCET-10)  MG per tablet 2 Tab  2 Tab   • liraglutide (VICTOZA) 18 MG/3ML injection 0.6 mg  0.6 mg   • methylPREDNISolone sod succ (SOLU-MEDROL) 1,000 mg in  mL IVPB  1 g   • albuterol inhaler 2 Puff  2 Puff   • aspirin EC (ECOTRIN) tablet 81 mg  81 mg   • atorvastatin (LIPITOR) tablet 40 mg  40 mg   • baclofen (LIORESAL) tablet 10 mg  10 mg   • cyanocobalamin (VITAMIN B-12) tablet 1,000 mcg  1,000 mcg   • fentanyl (DURAGESIC) 25 MCG/HR 1 Patch  1 Patch   • fluoxetine (PROZAC) capsule 10 mg  10 mg   • furosemide (LASIX) tablet 20 mg  20 mg   • ivabradine (CORLANOR) tablet 5  "mg  5 mg   • levothyroxine (SYNTHROID) tablet 75 mcg  75 mcg   • nitroglycerin (NITROSTAT) tablet 0.4 mg  0.4 mg   • nystatin (MYCOSTATIN) powder 1 Application  1 Application   • omeprazole (PRILOSEC) capsule 20 mg  20 mg   • famotidine (PEPCID) tablet 20 mg  20 mg   • sodium bicarbonate (SODIUM BICARBONATE) tablet 650 mg  650 mg   • [START ON 4/14/2017] vitamin D (Ergocalciferol) (DRISDOL) capsule 50,000 Units  50,000 Units   • ziprasidone (GEODON) capsule 160 mg  160 mg   • senna-docusate (PERICOLACE or SENOKOT S) 8.6-50 MG per tablet 2 Tab  2 Tab    And   • polyethylene glycol/lytes (MIRALAX) PACKET 1 Packet  1 Packet    And   • magnesium hydroxide (MILK OF MAGNESIA) suspension 30 mL  30 mL    And   • bisacodyl (DULCOLAX) suppository 10 mg  10 mg   • Respiratory Care per Protocol     • heparin injection 5,000 Units  5,000 Units   • labetalol (NORMODYNE,TRANDATE) injection 10 mg  10 mg   • ondansetron (ZOFRAN) syringe/vial injection 4 mg  4 mg   • ondansetron (ZOFRAN ODT) dispertab 4 mg  4 mg   • promethazine (PHENERGAN) tablet 12.5-25 mg  12.5-25 mg   • promethazine (PHENERGAN) suppository 12.5-25 mg  12.5-25 mg   • prochlorperazine (COMPAZINE) injection 5-10 mg  5-10 mg   • acetaminophen (TYLENOL) tablet 650 mg  650 mg   • insulin lispro (HUMALOG) injection 2-9 Units  2-9 Units   • Gabapentin (Once-Daily) TABS 600 mg  600 mg       Blood pressure 120/52, pulse 94, temperature 36.1 °C (96.9 °F), resp. rate 18, height 1.6 m (5' 3\"), weight 113.399 kg (250 lb), last menstrual period 06/13/2016, SpO2 94 %, not currently breastfeeding.        Recent Labs      04/12/17   1530  04/13/17   0237   WBC  6.3  4.2*   RBC  4.23  4.15*   HEMOGLOBIN  13.0  12.8   HEMATOCRIT  38.5  37.7   MCV  91.0  90.8   MCH  30.7  30.8   MCHC  33.8  34.0   RDW  45.9  43.6   PLATELETCT  202  203   MPV  11.6  11.7     Recent Labs      04/12/17   1530  04/13/17   0237   SODIUM  135  135   POTASSIUM  3.7  4.8   CHLORIDE  102  104   CO2  19*  19* "   GLUCOSE  115*  225*   BUN  11  12       Results for orders placed or performed during the hospital encounter of 02/16/16   ECHOCARDIOGRAM COMP W/O CONT   Result Value Ref Range    Eject.Frac. MOD BP 50.42     Eject.Frac. MOD 4C 55.05     Eject.Frac. MOD 2C 52.77         ASSESSMENT AND PLAN:   A 26-year-old female with unusual neurological    presentation, currently with tetraplegia involving bilateral lower extremities   and right upper extremity, etiology not clear, but likely nonorganic.   She will   con't with physical  therapy and occupational therapy.  She will con't with Solu-Medrol  !gr IV X3days.

## 2017-04-13 NOTE — DISCHARGE PLANNING
Upon utilization review, patient noted to be on the following medications that could potentially require prior authorization if prescribed at discharge:  Geodon, corlanor,  gapentin , .  If it is anticipated that patient will require these medications at discharge, beginning the prescription prior auth process in advance to anticipated discharge could assist in preventing delays when patient is medically cleared to be discharged from the hospital.

## 2017-04-14 ENCOUNTER — APPOINTMENT (OUTPATIENT)
Dept: MEDICAL GROUP | Facility: MEDICAL CENTER | Age: 27
End: 2017-04-14
Payer: MEDICARE

## 2017-04-14 ENCOUNTER — HOME CARE VISIT (OUTPATIENT)
Dept: HOME HEALTH SERVICES | Facility: HOME HEALTHCARE | Age: 27
End: 2017-04-14
Payer: MEDICARE

## 2017-04-14 PROBLEM — G82.50 QUADRIPARESIS (HCC): Status: ACTIVE | Noted: 2017-04-14

## 2017-04-14 LAB
ALBUMIN SERPL BCP-MCNC: 4.1 G/DL (ref 3.2–4.9)
ALBUMIN/GLOB SERPL: 2.1 G/DL
ALP SERPL-CCNC: 67 U/L (ref 30–99)
ALT SERPL-CCNC: 99 U/L (ref 2–50)
ANION GAP SERPL CALC-SCNC: 7 MMOL/L (ref 0–11.9)
AST SERPL-CCNC: 60 U/L (ref 12–45)
BILIRUB SERPL-MCNC: 0.4 MG/DL (ref 0.1–1.5)
BUN SERPL-MCNC: 19 MG/DL (ref 8–22)
CALCIUM SERPL-MCNC: 9.4 MG/DL (ref 8.5–10.5)
CHLORIDE SERPL-SCNC: 103 MMOL/L (ref 96–112)
CO2 SERPL-SCNC: 25 MMOL/L (ref 20–33)
CREAT SERPL-MCNC: 0.72 MG/DL (ref 0.5–1.4)
ERYTHROCYTE [DISTWIDTH] IN BLOOD BY AUTOMATED COUNT: 44.1 FL (ref 35.9–50)
GFR SERPL CREATININE-BSD FRML MDRD: >60 ML/MIN/1.73 M 2
GLOBULIN SER CALC-MCNC: 2 G/DL (ref 1.9–3.5)
GLUCOSE BLD-MCNC: 194 MG/DL (ref 65–99)
GLUCOSE BLD-MCNC: 194 MG/DL (ref 65–99)
GLUCOSE BLD-MCNC: 203 MG/DL (ref 65–99)
GLUCOSE BLD-MCNC: 267 MG/DL (ref 65–99)
GLUCOSE SERPL-MCNC: 240 MG/DL (ref 65–99)
HCT VFR BLD AUTO: 35.6 % (ref 37–47)
HGB BLD-MCNC: 12 G/DL (ref 12–16)
MCH RBC QN AUTO: 30.8 PG (ref 27–33)
MCHC RBC AUTO-ENTMCNC: 33.7 G/DL (ref 33.6–35)
MCV RBC AUTO: 91.3 FL (ref 81.4–97.8)
PLATELET # BLD AUTO: 202 K/UL (ref 164–446)
PMV BLD AUTO: 11.5 FL (ref 9–12.9)
POTASSIUM SERPL-SCNC: 4.3 MMOL/L (ref 3.6–5.5)
PROT SERPL-MCNC: 6.1 G/DL (ref 6–8.2)
RBC # BLD AUTO: 3.9 M/UL (ref 4.2–5.4)
SODIUM SERPL-SCNC: 135 MMOL/L (ref 135–145)
WBC # BLD AUTO: 7.9 K/UL (ref 4.8–10.8)

## 2017-04-14 PROCEDURE — A9270 NON-COVERED ITEM OR SERVICE: HCPCS | Performed by: INTERNAL MEDICINE

## 2017-04-14 PROCEDURE — 665999 HH PPS REVENUE DEBIT

## 2017-04-14 PROCEDURE — 99233 SBSQ HOSP IP/OBS HIGH 50: CPT | Performed by: INTERNAL MEDICINE

## 2017-04-14 PROCEDURE — 700102 HCHG RX REV CODE 250 W/ 637 OVERRIDE(OP): Performed by: INTERNAL MEDICINE

## 2017-04-14 PROCEDURE — 700105 HCHG RX REV CODE 258: Performed by: INTERNAL MEDICINE

## 2017-04-14 PROCEDURE — 36415 COLL VENOUS BLD VENIPUNCTURE: CPT

## 2017-04-14 PROCEDURE — 85027 COMPLETE CBC AUTOMATED: CPT

## 2017-04-14 PROCEDURE — 82962 GLUCOSE BLOOD TEST: CPT

## 2017-04-14 PROCEDURE — 80053 COMPREHEN METABOLIC PANEL: CPT

## 2017-04-14 PROCEDURE — 770006 HCHG ROOM/CARE - MED/SURG/GYN SEMI*

## 2017-04-14 PROCEDURE — 665998 HH PPS REVENUE CREDIT

## 2017-04-14 PROCEDURE — 700111 HCHG RX REV CODE 636 W/ 250 OVERRIDE (IP): Performed by: INTERNAL MEDICINE

## 2017-04-14 RX ORDER — INSULIN GLARGINE 100 [IU]/ML
10 INJECTION, SOLUTION SUBCUTANEOUS EVERY EVENING
Status: DISCONTINUED | OUTPATIENT
Start: 2017-04-14 | End: 2017-04-16 | Stop reason: HOSPADM

## 2017-04-14 RX ORDER — FLUCONAZOLE 100 MG/1
200 TABLET ORAL ONCE
Status: COMPLETED | OUTPATIENT
Start: 2017-04-14 | End: 2017-04-14

## 2017-04-14 RX ADMIN — BACLOFEN 10 MG: 10 TABLET ORAL at 20:54

## 2017-04-14 RX ADMIN — ALBUTEROL SULFATE 2 PUFF: 90 AEROSOL, METERED RESPIRATORY (INHALATION) at 16:33

## 2017-04-14 RX ADMIN — ALBUTEROL SULFATE 2 PUFF: 90 AEROSOL, METERED RESPIRATORY (INHALATION) at 01:35

## 2017-04-14 RX ADMIN — CYANOCOBALAMIN TAB 500 MCG 1000 MCG: 500 TAB at 08:06

## 2017-04-14 RX ADMIN — INSULIN LISPRO 5 UNITS: 100 INJECTION, SOLUTION INTRAVENOUS; SUBCUTANEOUS at 17:09

## 2017-04-14 RX ADMIN — ONDANSETRON 4 MG: 4 TABLET, ORALLY DISINTEGRATING ORAL at 07:07

## 2017-04-14 RX ADMIN — SODIUM BICARBONATE TAB 650 MG 650 MG: 650 TAB at 14:02

## 2017-04-14 RX ADMIN — FAMOTIDINE 20 MG: 20 TABLET, FILM COATED ORAL at 20:54

## 2017-04-14 RX ADMIN — STANDARDIZED SENNA CONCENTRATE AND DOCUSATE SODIUM 2 TABLET: 8.6; 5 TABLET, FILM COATED ORAL at 08:05

## 2017-04-14 RX ADMIN — OXYCODONE HYDROCHLORIDE AND ACETAMINOPHEN 2 TABLET: 10; 325 TABLET ORAL at 08:35

## 2017-04-14 RX ADMIN — OMEPRAZOLE 20 MG: 20 CAPSULE, DELAYED RELEASE ORAL at 16:51

## 2017-04-14 RX ADMIN — OXYCODONE HYDROCHLORIDE AND ACETAMINOPHEN 2 TABLET: 10; 325 TABLET ORAL at 16:51

## 2017-04-14 RX ADMIN — FLUOXETINE 10 MG: 10 CAPSULE ORAL at 08:05

## 2017-04-14 RX ADMIN — OMEPRAZOLE 20 MG: 20 CAPSULE, DELAYED RELEASE ORAL at 08:06

## 2017-04-14 RX ADMIN — IVABRADINE 5 MG: 5 TABLET, FILM COATED ORAL at 07:30

## 2017-04-14 RX ADMIN — BACLOFEN 10 MG: 10 TABLET ORAL at 14:02

## 2017-04-14 RX ADMIN — IVABRADINE 5 MG: 5 TABLET, FILM COATED ORAL at 17:30

## 2017-04-14 RX ADMIN — ASPIRIN 81 MG: 81 TABLET ORAL at 08:05

## 2017-04-14 RX ADMIN — NYSTATIN 1 APPLICATION: 100000 POWDER TOPICAL at 15:00

## 2017-04-14 RX ADMIN — FLUCONAZOLE 200 MG: 100 TABLET ORAL at 17:08

## 2017-04-14 RX ADMIN — STANDARDIZED SENNA CONCENTRATE AND DOCUSATE SODIUM 2 TABLET: 8.6; 5 TABLET, FILM COATED ORAL at 20:53

## 2017-04-14 RX ADMIN — HEPARIN SODIUM 5000 UNITS: 5000 INJECTION, SOLUTION INTRAVENOUS; SUBCUTANEOUS at 14:02

## 2017-04-14 RX ADMIN — NYSTATIN 1 APPLICATION: 100000 POWDER TOPICAL at 20:56

## 2017-04-14 RX ADMIN — OXYCODONE HYDROCHLORIDE AND ACETAMINOPHEN 2 TABLET: 10; 325 TABLET ORAL at 20:52

## 2017-04-14 RX ADMIN — MAGNESIUM HYDROXIDE 30 ML: 400 SUSPENSION ORAL at 21:11

## 2017-04-14 RX ADMIN — FUROSEMIDE 20 MG: 20 TABLET ORAL at 08:06

## 2017-04-14 RX ADMIN — INSULIN LISPRO 2 UNITS: 100 INJECTION, SOLUTION INTRAVENOUS; SUBCUTANEOUS at 12:00

## 2017-04-14 RX ADMIN — CYANOCOBALAMIN TAB 500 MCG 1000 MCG: 500 TAB at 20:53

## 2017-04-14 RX ADMIN — ERGOCALCIFEROL 50000 UNITS: 1.25 CAPSULE ORAL at 08:05

## 2017-04-14 RX ADMIN — SODIUM CHLORIDE 1000 MG: 9 INJECTION, SOLUTION INTRAVENOUS at 08:24

## 2017-04-14 RX ADMIN — INSULIN GLARGINE 10 UNITS: 100 INJECTION, SOLUTION SUBCUTANEOUS at 21:07

## 2017-04-14 RX ADMIN — OXYCODONE HYDROCHLORIDE AND ACETAMINOPHEN 2 TABLET: 10; 325 TABLET ORAL at 04:26

## 2017-04-14 RX ADMIN — GABAPENTIN 600 MG: 600 TABLET ORAL at 15:00

## 2017-04-14 RX ADMIN — FAMOTIDINE 20 MG: 20 TABLET, FILM COATED ORAL at 08:06

## 2017-04-14 RX ADMIN — ATORVASTATIN CALCIUM 40 MG: 40 TABLET, FILM COATED ORAL at 20:54

## 2017-04-14 RX ADMIN — ZIPRASIDONE HYDROCHLORIDE 160 MG: 80 CAPSULE ORAL at 17:07

## 2017-04-14 RX ADMIN — HEPARIN SODIUM 5000 UNITS: 5000 INJECTION, SOLUTION INTRAVENOUS; SUBCUTANEOUS at 06:02

## 2017-04-14 RX ADMIN — GABAPENTIN 600 MG: 600 TABLET ORAL at 20:51

## 2017-04-14 RX ADMIN — OXYCODONE HYDROCHLORIDE AND ACETAMINOPHEN 2 TABLET: 10; 325 TABLET ORAL at 12:44

## 2017-04-14 RX ADMIN — LEVOTHYROXINE SODIUM 75 MCG: 75 TABLET ORAL at 06:02

## 2017-04-14 RX ADMIN — INSULIN LISPRO 2 UNITS: 100 INJECTION, SOLUTION INTRAVENOUS; SUBCUTANEOUS at 21:04

## 2017-04-14 RX ADMIN — GABAPENTIN 600 MG: 600 TABLET ORAL at 09:00

## 2017-04-14 RX ADMIN — SODIUM BICARBONATE TAB 650 MG 650 MG: 650 TAB at 20:54

## 2017-04-14 RX ADMIN — BACLOFEN 10 MG: 10 TABLET ORAL at 08:05

## 2017-04-14 RX ADMIN — INSULIN LISPRO 3 UNITS: 100 INJECTION, SOLUTION INTRAVENOUS; SUBCUTANEOUS at 06:06

## 2017-04-14 RX ADMIN — NYSTATIN 1 APPLICATION: 100000 POWDER TOPICAL at 09:00

## 2017-04-14 RX ADMIN — HEPARIN SODIUM 5000 UNITS: 5000 INJECTION, SOLUTION INTRAVENOUS; SUBCUTANEOUS at 20:54

## 2017-04-14 RX ADMIN — SODIUM BICARBONATE TAB 650 MG 650 MG: 650 TAB at 08:06

## 2017-04-14 ASSESSMENT — ENCOUNTER SYMPTOMS
BACK PAIN: 0
PALPITATIONS: 0
SHORTNESS OF BREATH: 0
FOCAL WEAKNESS: 1
NAUSEA: 0
DIZZINESS: 0
CHILLS: 0
HEADACHES: 0
VOMITING: 0
FEVER: 0
ABDOMINAL PAIN: 0
COUGH: 0
MENTAL STATUS CHANGE: 0
SENSORY CHANGE: 1

## 2017-04-14 ASSESSMENT — PAIN SCALES - GENERAL
PAINLEVEL_OUTOF10: 5
PAINLEVEL_OUTOF10: 5
PAINLEVEL_OUTOF10: 7
PAINLEVEL_OUTOF10: 8
PAINLEVEL_OUTOF10: 9
PAINLEVEL_OUTOF10: 7
PAINLEVEL_OUTOF10: 8

## 2017-04-14 NOTE — PROGRESS NOTES
Pt requesting pain medication for pain in back, hips and knees. Pt is able to move BUE but BLE remain passive. C/O numbness in RLE and RUE. Turned to the right and boot switched over to the left foot. Pt made aware she would be moving to another room because of incoming admissions. Pt upset and stated she refuses and was told she wouldn't be made to move again. Pt called charge nurse on her cell phone and discussed it with him. Will monitor.

## 2017-04-14 NOTE — PROGRESS NOTES
Hospital Medicine Progress Note, Adult, Complex               Author: Floridalma Taylor Date & Time created: 4/14/2017  8:23 AM     Interval History:  Hospital Course:  Kristin Balderrama is a 26 y.o. female w/ h/o DM, hypothyroidism, urinary retention with suprapubic catheter, CKD admitted 4/12/2017 for tetraplegia of RUE, BLE. She has had episodes of this in the past, with full workups that have remained elusive to the etiology. These episodes historically improve with IV steroids.     Today, the patient reports improvement of her strength. She is now able to lift her right arm to her head. Her BLE is still weak but with some movement. Denies new complaint.     Review of Systems:  Review of Systems   Constitutional: Negative for fever, chills and malaise/fatigue.   Respiratory: Negative for cough and shortness of breath.    Cardiovascular: Negative for chest pain, palpitations and leg swelling.   Gastrointestinal: Negative for nausea, vomiting and abdominal pain.   Genitourinary: Negative for dysuria.   Musculoskeletal: Negative for back pain.   Neurological: Positive for sensory change (BLE numb) and focal weakness (RUE and BLE strength improving). Negative for dizziness and headaches.   All other systems reviewed and are negative.      Physical Exam:  Physical Exam   Constitutional: She is oriented to person, place, and time. She appears well-developed and well-nourished. No distress.   Obese female lying in bed   HENT:   Head: Normocephalic and atraumatic.   Eyes: Conjunctivae are normal. No scleral icterus.   Neck: Normal range of motion. Neck supple.   Cardiovascular: Normal rate and regular rhythm.    No murmur heard.  Pulmonary/Chest: Effort normal and breath sounds normal. No respiratory distress. She has no rales.   Abdominal: Soft. Bowel sounds are normal.   Musculoskeletal: Normal range of motion.   Neurological: She is alert and oriented to person, place, and time.   Neuro Exam:  Alert, oriented  Patient  reports no change to visual acuity, PEARRL   CN III-XII INTACT  Extremity Strength abnormal as below  Coordination abnormal as below  Romberg negative  Gait un testable d/t weakness  RUE strength 3/5, LUE 4/5; BLE 2/5   BLE with foot drop  Sensation limited RUE, BLE per patient report- reports numbness         Skin: Skin is warm and dry.   Vitals reviewed.      Labs:        Invalid input(s): [id number]      Recent Labs      17   0559   SODIUM  135  135  135   POTASSIUM  3.7  4.8  4.3   CHLORIDE  102  104  103   CO2  19*  19*  25   BUN  11  12  19   CREATININE  0.67  0.70  0.72   CALCIUM  9.9  9.7  9.4     Recent Labs      17   0559   ALTSGPT  94*   --   99*   ASTSGOT  86*   --   60*   ALKPHOSPHAT  75   --   67   TBILIRUBIN  0.7   --   0.4   GLUCOSE  115*  225*  240*     Recent Labs      17   0559   RBC  4.23  4.15*  3.90*   HEMOGLOBIN  13.0  12.8  12.0   HEMATOCRIT  38.5  37.7  35.6*   PLATELETCT  202  203  202     Recent Labs      17   0559   WBC  6.3  4.2*  7.9   NEUTSPOLYS  49.50  79.50*   --    LYMPHOCYTES  39.60  19.10*   --    MONOCYTES  6.40  0.50   --    EOSINOPHILS  3.70  0.20   --    BASOPHILS  0.50  0.20   --    ASTSGOT  86*   --   60*   ALTSGPT  94*   --   99*   ALKPHOSPHAT  75   --   67   TBILIRUBIN  0.7   --   0.4           Hemodynamics:  Temp (24hrs), Av.6 °C (97.9 °F), Min:36.3 °C (97.3 °F), Max:36.7 °C (98.1 °F)  Temperature: 36.7 °C (98.1 °F)  Pulse  Av.1  Min: 69  Max: 94   Blood Pressure: 118/54 mmHg     Respiratory:    Respiration: 18, Pulse Oximetry: 96 %     Work Of Breathing / Effort: Mild  RUL Breath Sounds: Clear, RML Breath Sounds: Clear, RLL Breath Sounds: Diminished, MARY Breath Sounds: Clear, LLL Breath Sounds: Diminished  Fluids:    Intake/Output Summary (Last 24 hours) at 17 0823  Last data filed at 17  "0300   Gross per 24 hour   Intake   1480 ml   Output   1200 ml   Net    280 ml        GI/Nutrition:  Orders Placed This Encounter   Procedures   • Diet Order     Standing Status: Standing      Number of Occurrences: 1      Standing Expiration Date:      Order Specific Question:  Diet:     Answer:  Diabetic [3]     Medical Decision Making, by Problem:  Active Hospital Problems    Diagnosis   • *Tetraplegia (CMS-HCC) [G82.50]- improving  -Has had full neuro workup OP, per Dr Santa note 4/17- \"CSF studies, neurophysiologic studies, imaging studies, etc. have all proven unremarkable. Even a muscle biopsy was nondiagnostic.\"  -Plan for possible repeat EMG studies OP  -MRI brain 11/15- neg  -CTA head/neck 1/17- neg  -Appreciate neurology reccs Dr Acosta  -Continue solumedrol 1G daily x 5 days  -PT/OT  -Inorganic?? Psych related??   • Hyperglycemia [R73.9]- suspect 2/2 steroid  -Trend  -ISS ACHS, cont victoza  -Glycohemoglobin 4/6 7.2   • Elevated liver function tests [R79.89]- improved today  -appears chronic, denies abdominal pain  -US abd 1/15- hepatic steatosis, no CBD stone or dilation, s/p delmar   • Psychosis, schizophrenia, simple (HCC) [F20.89]- cont prozac, geodon; stable   • Hypothyroidism [E03.9]- cont synthroid   • Chronic UTI [N39.0]  -completed course of cipro  -Denies urinary complaints, no WBC/fever or signs of infection- continue to monitor   • Chronic suprapubic catheter (CMS-HCC) [Z93.59]- OP management   • HTN (hypertension) [I10]- continue lasix   • Chronic pain syndrome [G89.4]- continue OP regimen   • GERD (gastroesophageal reflux disease) [K21.9]- cont PPI     Foot drop  -Foot drop boot per ortho tech placed  -Alternate feet    Dispo: home once improved, PT/OT eval   Transition to inpatient neurology for IV steroids, patient will likely need greater than 2 midnight stay for care    Labs reviewed and Medications reviewed  Andrade catheter: No Andrade (suprapubic catheter)      DVT Prophylaxis: " Enoxaparin (Lovenox)    Ulcer prophylaxis: Not indicated    Assessed for rehab: Patient was assess for and/or received rehabilitation services during this hospitalization

## 2017-04-14 NOTE — CARE PLAN
Problem: Safety  Goal: Will remain free from injury  Intervention: Provide assistance with mobility  Instruct pt to notify staff when needing to ambulate/transfer to reduce fall risk.       Problem: Infection  Goal: Will remain free from infection  Intervention: Implement standard precautions and perform hand washing before and after patient contact  Instruct pt/family on standard precautions to help prevent spread of infection/germs.

## 2017-04-14 NOTE — PROGRESS NOTES
AOX4. VSS . 2/5 Methylprednisolone given. PT work with patient. Pt.gain more movement in right upper extremity  . Pt. C/o baseline pain . PRN pain meds frequency was increased from Q6 hr to Q4hr .

## 2017-04-14 NOTE — PROGRESS NOTES
Pt is a/o x 4, calm, cooperative, compliant with plan of care. Pt initially admitted for BLE and RUE weakness, but pt states mobility has been improving. Pt tolerating PO and diet well, transfers with 1-2 person assist. Pt informed to continue plan of care as ordered, all questions answered, call light within reach.

## 2017-04-14 NOTE — CARE PLAN
Problem: Infection  Goal: Will remain free from infection  Outcome: PROGRESSING AS EXPECTED  No s/s of infection     Problem: Pain Management  Goal: Pain level will decrease to patient’s comfort goal  Outcome: PROGRESSING AS EXPECTED  PRN pain meds given

## 2017-04-15 PROBLEM — E66.9 DIABETES MELLITUS TYPE 2 IN OBESE: Status: ACTIVE | Noted: 2017-04-13

## 2017-04-15 PROBLEM — Z99.81 ON HOME OXYGEN THERAPY: Chronic | Status: ACTIVE | Noted: 2017-04-15

## 2017-04-15 PROBLEM — E11.69 DIABETES MELLITUS TYPE 2 IN OBESE: Status: ACTIVE | Noted: 2017-04-13

## 2017-04-15 LAB
GLUCOSE BLD-MCNC: 143 MG/DL (ref 65–99)
GLUCOSE BLD-MCNC: 170 MG/DL (ref 65–99)
GLUCOSE BLD-MCNC: 171 MG/DL (ref 65–99)
GLUCOSE BLD-MCNC: 199 MG/DL (ref 65–99)

## 2017-04-15 PROCEDURE — A9270 NON-COVERED ITEM OR SERVICE: HCPCS | Performed by: INTERNAL MEDICINE

## 2017-04-15 PROCEDURE — 99232 SBSQ HOSP IP/OBS MODERATE 35: CPT | Performed by: INTERNAL MEDICINE

## 2017-04-15 PROCEDURE — 700102 HCHG RX REV CODE 250 W/ 637 OVERRIDE(OP): Performed by: INTERNAL MEDICINE

## 2017-04-15 PROCEDURE — 665999 HH PPS REVENUE DEBIT

## 2017-04-15 PROCEDURE — 700105 HCHG RX REV CODE 258: Performed by: INTERNAL MEDICINE

## 2017-04-15 PROCEDURE — 700111 HCHG RX REV CODE 636 W/ 250 OVERRIDE (IP): Performed by: INTERNAL MEDICINE

## 2017-04-15 PROCEDURE — 770006 HCHG ROOM/CARE - MED/SURG/GYN SEMI*

## 2017-04-15 PROCEDURE — 665998 HH PPS REVENUE CREDIT

## 2017-04-15 PROCEDURE — 82962 GLUCOSE BLOOD TEST: CPT

## 2017-04-15 RX ADMIN — BACLOFEN 10 MG: 10 TABLET ORAL at 22:08

## 2017-04-15 RX ADMIN — INSULIN GLARGINE 10 UNITS: 100 INJECTION, SOLUTION SUBCUTANEOUS at 22:09

## 2017-04-15 RX ADMIN — OMEPRAZOLE 20 MG: 20 CAPSULE, DELAYED RELEASE ORAL at 08:13

## 2017-04-15 RX ADMIN — ZIPRASIDONE HYDROCHLORIDE 160 MG: 80 CAPSULE ORAL at 17:18

## 2017-04-15 RX ADMIN — INSULIN LISPRO 2 UNITS: 100 INJECTION, SOLUTION INTRAVENOUS; SUBCUTANEOUS at 17:20

## 2017-04-15 RX ADMIN — SODIUM BICARBONATE TAB 650 MG 650 MG: 650 TAB at 14:12

## 2017-04-15 RX ADMIN — BACLOFEN 10 MG: 10 TABLET ORAL at 14:12

## 2017-04-15 RX ADMIN — ATORVASTATIN CALCIUM 40 MG: 40 TABLET, FILM COATED ORAL at 22:08

## 2017-04-15 RX ADMIN — CYANOCOBALAMIN TAB 500 MCG 1000 MCG: 500 TAB at 22:09

## 2017-04-15 RX ADMIN — FLUOXETINE 10 MG: 10 CAPSULE ORAL at 08:13

## 2017-04-15 RX ADMIN — STANDARDIZED SENNA CONCENTRATE AND DOCUSATE SODIUM 2 TABLET: 8.6; 5 TABLET, FILM COATED ORAL at 08:13

## 2017-04-15 RX ADMIN — NYSTATIN 1500000 UNITS: 100000 POWDER TOPICAL at 08:15

## 2017-04-15 RX ADMIN — POLYETHYLENE GLYCOL 3350 1 PACKET: 17 POWDER, FOR SOLUTION ORAL at 09:18

## 2017-04-15 RX ADMIN — FAMOTIDINE 20 MG: 20 TABLET, FILM COATED ORAL at 08:13

## 2017-04-15 RX ADMIN — SODIUM CHLORIDE 1000 MG: 9 INJECTION, SOLUTION INTRAVENOUS at 09:17

## 2017-04-15 RX ADMIN — NYSTATIN 1 APPLICATION: 100000 POWDER TOPICAL at 21:00

## 2017-04-15 RX ADMIN — OMEPRAZOLE 20 MG: 20 CAPSULE, DELAYED RELEASE ORAL at 17:18

## 2017-04-15 RX ADMIN — GABAPENTIN 600 MG: 600 TABLET ORAL at 22:15

## 2017-04-15 RX ADMIN — GABAPENTIN 600 MG: 600 TABLET ORAL at 15:00

## 2017-04-15 RX ADMIN — GABAPENTIN 600 MG: 600 TABLET ORAL at 09:00

## 2017-04-15 RX ADMIN — ASPIRIN 81 MG: 81 TABLET ORAL at 08:13

## 2017-04-15 RX ADMIN — ONDANSETRON 4 MG: 4 TABLET, ORALLY DISINTEGRATING ORAL at 06:40

## 2017-04-15 RX ADMIN — OXYCODONE HYDROCHLORIDE AND ACETAMINOPHEN 2 TABLET: 10; 325 TABLET ORAL at 09:47

## 2017-04-15 RX ADMIN — IVABRADINE 5 MG: 5 TABLET, FILM COATED ORAL at 17:30

## 2017-04-15 RX ADMIN — SODIUM BICARBONATE TAB 650 MG 650 MG: 650 TAB at 08:13

## 2017-04-15 RX ADMIN — SODIUM BICARBONATE TAB 650 MG 650 MG: 650 TAB at 22:09

## 2017-04-15 RX ADMIN — INSULIN LISPRO 2 UNITS: 100 INJECTION, SOLUTION INTRAVENOUS; SUBCUTANEOUS at 22:10

## 2017-04-15 RX ADMIN — HEPARIN SODIUM 5000 UNITS: 5000 INJECTION, SOLUTION INTRAVENOUS; SUBCUTANEOUS at 05:25

## 2017-04-15 RX ADMIN — ONDANSETRON 4 MG: 2 INJECTION INTRAMUSCULAR; INTRAVENOUS at 12:07

## 2017-04-15 RX ADMIN — NYSTATIN 1500000 UNITS: 100000 POWDER TOPICAL at 14:13

## 2017-04-15 RX ADMIN — OXYCODONE HYDROCHLORIDE AND ACETAMINOPHEN 2 TABLET: 10; 325 TABLET ORAL at 16:30

## 2017-04-15 RX ADMIN — STANDARDIZED SENNA CONCENTRATE AND DOCUSATE SODIUM 2 TABLET: 8.6; 5 TABLET, FILM COATED ORAL at 22:09

## 2017-04-15 RX ADMIN — IVABRADINE 5 MG: 5 TABLET, FILM COATED ORAL at 07:30

## 2017-04-15 RX ADMIN — INSULIN LISPRO 2 UNITS: 100 INJECTION, SOLUTION INTRAVENOUS; SUBCUTANEOUS at 05:35

## 2017-04-15 RX ADMIN — HEPARIN SODIUM 5000 UNITS: 5000 INJECTION, SOLUTION INTRAVENOUS; SUBCUTANEOUS at 22:08

## 2017-04-15 RX ADMIN — FUROSEMIDE 20 MG: 20 TABLET ORAL at 08:13

## 2017-04-15 RX ADMIN — CYANOCOBALAMIN TAB 500 MCG 1000 MCG: 500 TAB at 08:13

## 2017-04-15 RX ADMIN — LEVOTHYROXINE SODIUM 75 MCG: 75 TABLET ORAL at 05:25

## 2017-04-15 RX ADMIN — OXYCODONE HYDROCHLORIDE AND ACETAMINOPHEN 2 TABLET: 10; 325 TABLET ORAL at 05:25

## 2017-04-15 RX ADMIN — FAMOTIDINE 20 MG: 20 TABLET, FILM COATED ORAL at 22:08

## 2017-04-15 RX ADMIN — BACLOFEN 10 MG: 10 TABLET ORAL at 08:13

## 2017-04-15 RX ADMIN — HEPARIN SODIUM 5000 UNITS: 5000 INJECTION, SOLUTION INTRAVENOUS; SUBCUTANEOUS at 14:13

## 2017-04-15 RX ADMIN — FENTANYL 1 PATCH: 25 PATCH, EXTENDED RELEASE TRANSDERMAL at 17:18

## 2017-04-15 ASSESSMENT — ENCOUNTER SYMPTOMS
SPEECH CHANGE: 0
SENSORY CHANGE: 1
ABDOMINAL PAIN: 0
VOMITING: 0
CONSTIPATION: 0
FOCAL WEAKNESS: 1
HEADACHES: 0
BACK PAIN: 0
NAUSEA: 0
PALPITATIONS: 0
FEVER: 0
DIZZINESS: 0
SHORTNESS OF BREATH: 0
COUGH: 0
CHILLS: 0

## 2017-04-15 ASSESSMENT — PAIN SCALES - GENERAL
PAINLEVEL_OUTOF10: 0
PAINLEVEL_OUTOF10: 8
PAINLEVEL_OUTOF10: 8
PAINLEVEL_OUTOF10: 0
PAINLEVEL_OUTOF10: 2
PAINLEVEL_OUTOF10: 2
PAINLEVEL_OUTOF10: 9
PAINLEVEL_OUTOF10: 0

## 2017-04-15 NOTE — FACE TO FACE
Face to Face Supporting Documentation - Home Health    The encounter with this patient was in whole or in part the primary reason for home health admission.    Date of encounter:   Patient:                    MRN:                       YOB: 2018  Kristin Balderrama  [id number]  1990     Home health to see patient for:  Skilled Nursing care for assessment, interventions & education, Physical Therapy evaluation and treatment, Occupational therapy evaluation and treatment and Home health aide    Skilled need for:  Exacerbation of Chronic Disease State spastic/temporary quadraparesis    Skilled nursing interventions to include:  Comment: weakness and help with therapies    Homebound status evidenced by:  Need the aid of supportive devices such as crutches, canes, wheelchairs or walkers. Leaving home requires a considerable and taxing effort. There is a normal inability to leave the home.    Community Physician to provide follow up care: Torres Brody M.D.     Optional Interventions? No      I certify the face to face encounter for this home health care referral meets the CMS requirements and the encounter/clinical assessment with the patient was, in whole, or in part, for the medical condition(s) listed above, which is the primary reason for home health care. Based on my clinical findings: the service(s) are medically necessary, support the need for home health care, and the homebound criteria are met.  I certify that this patient has had a face to face encounter by myself.  Estevan Hogan M.D. - NPI: [id number]

## 2017-04-15 NOTE — PROGRESS NOTES
Rounded on pt, updated on POC. Waiting for HH to be accepted and for pt to have diabetic education and PT evaluation.

## 2017-04-15 NOTE — DIETARY
Nutrition note:  Received consult for DM diet education.  RD will provide diet education as appropriate prior to discharge.

## 2017-04-15 NOTE — PROGRESS NOTES
Pt educated on need for bed alarm, continues to refuse. Call light within reach. Hourly rounding in place.

## 2017-04-15 NOTE — CARE PLAN
Problem: Skin Integrity  Goal: Risk for impaired skin integrity will decrease  Intervention: Assess risk factors for impaired skin integrity and/or pressure ulcers  Pt requires some help turning and placing pillows. Pt has started to turn self

## 2017-04-15 NOTE — DISCHARGE PLANNING
SW received HH order. Spoke to pt at bedside, she has been on service with [facility] before and would like to use that agency again. Choice form completed and faxed to [facility]. Pt will likely DC home tomorrow.

## 2017-04-15 NOTE — PROGRESS NOTES
Hospital Medicine Progress Note, Adult, Complex               Author: Floridalma Taylor Date & Time created: 4/15/2017  2:04 PM     Interval History:  Hospital Course:  Kristin Balderrama is a 26 y.o. female w/ h/o DM, hypothyroidism, urinary retention with suprapubic catheter, CKD admitted 4/12/2017 for tetraplegia of RUE, BLE. She has had episodes of this in the past, with full workups that have remained elusive to the etiology. These episodes historically improve with IV steroids.     Today, the patient reports marked improvement in her strength. BLE still weak but she thinks at baseline. Await PT reccs. Denies new complaint, feels ready to go home.       Review of Systems:  Review of Systems   Constitutional: Negative for fever, chills and malaise/fatigue.   Respiratory: Negative for cough and shortness of breath.    Cardiovascular: Negative for chest pain, palpitations and leg swelling.   Gastrointestinal: Negative for nausea, vomiting, abdominal pain and constipation.   Genitourinary: Negative for dysuria.   Musculoskeletal: Negative for back pain.   Neurological: Positive for sensory change (BLE numb) and focal weakness (RUE and BLE strength improving- now raising RUE, movement in BLE). Negative for dizziness, speech change and headaches.   All other systems reviewed and are negative.      Physical Exam:  Physical Exam   Constitutional: She is oriented to person, place, and time. She appears well-developed and well-nourished. No distress.   Obese female lying in bed   HENT:   Head: Normocephalic and atraumatic.   Eyes: Conjunctivae are normal.   Neck: Normal range of motion. Neck supple.   Cardiovascular: Normal rate and regular rhythm.    No murmur heard.  Pulmonary/Chest: Effort normal and breath sounds normal. No respiratory distress. She has no wheezes. She has no rales.   Abdominal: Soft. Bowel sounds are normal. There is no tenderness.   Musculoskeletal: Normal range of motion. She exhibits no edema.    Foot drop resolved today   Neurological: She is alert and oriented to person, place, and time.   Neuro Exam:  Alert, oriented  Patient reports no change to visual acuity, PEARRL   CN III-XII INTACT  Extremity Strength abnormal as below  Coordination abnormal as below  Romberg negative  Gait limited d/t weakness  RUE strength 4/5, LUE 4/5; BLE 4/5   BLE with foot drop  Sensation limited RUE, BLE per patient report- reports numbness         Skin: Skin is warm and dry.   Vitals reviewed.      Labs:        Invalid input(s): [id number]      Recent Labs      17   1530  17   0237  17   0559   SODIUM  135  135  135   POTASSIUM  3.7  4.8  4.3   CHLORIDE  102  104  103   CO2  19*  19*  25   BUN  11  12  19   CREATININE  0.67  0.70  0.72   CALCIUM  9.9  9.7  9.4     Recent Labs      17   1530  17   0237  17   0559   ALTSGPT  94*   --   99*   ASTSGOT  86*   --   60*   ALKPHOSPHAT  75   --   67   TBILIRUBIN  0.7   --   0.4   GLUCOSE  115*  225*  240*     Recent Labs      17   1530  17   0237  17   0559   RBC  4.23  4.15*  3.90*   HEMOGLOBIN  13.0  12.8  12.0   HEMATOCRIT  38.5  37.7  35.6*   PLATELETCT  202  203  202     Recent Labs      17   1530  17   0237  17   0559   WBC  6.3  4.2*  7.9   NEUTSPOLYS  49.50  79.50*   --    LYMPHOCYTES  39.60  19.10*   --    MONOCYTES  6.40  0.50   --    EOSINOPHILS  3.70  0.20   --    BASOPHILS  0.50  0.20   --    ASTSGOT  86*   --   60*   ALTSGPT  94*   --   99*   ALKPHOSPHAT  75   --   67   TBILIRUBIN  0.7   --   0.4           Hemodynamics:  Temp (24hrs), Av.5 °C (97.7 °F), Min:36.2 °C (97.2 °F), Max:37.1 °C (98.7 °F)  Temperature: 36.4 °C (97.5 °F)  Pulse  Av.9  Min: 69  Max: 96   Blood Pressure: 140/82 mmHg     Respiratory:    Respiration: 18, Pulse Oximetry: 96 %     Work Of Breathing / Effort: Mild  RUL Breath Sounds: Clear, RML Breath Sounds: Clear, RLL Breath Sounds: Diminished, MARY Breath Sounds:  "Clear, LLL Breath Sounds: Diminished  Fluids:    Intake/Output Summary (Last 24 hours) at 04/15/17 1404  Last data filed at 04/15/17 0400   Gross per 24 hour   Intake    120 ml   Output   1000 ml   Net   -880 ml        GI/Nutrition:  Orders Placed This Encounter   Procedures   • Diet Order     Standing Status: Standing      Number of Occurrences: 1      Standing Expiration Date:      Order Specific Question:  Diet:     Answer:  Diabetic [3]     Medical Decision Making, by Problem:  Active Hospital Problems    Diagnosis   • *Tetraplegia (CMS-HCC) [G82.50]- improving  -Has had full neuro workup OP, per Dr Santa note 4/17- \"CSF studies, neurophysiologic studies, imaging studies, etc. have all proven unremarkable. Even a muscle biopsy was nondiagnostic.\"  -Plan for possible repeat EMG studies OP  -MRI brain 11/15- neg  -CTA head/neck 1/17- neg  -Appreciate neurology reccs Dr Acosta  -Continue solumedrol 1G daily x 5 days  -PT/OT  -Inorganic?? Psych related??   • Diabetes type II  -Trend  -ISS ACHS, cont victoza  -Glycohemoglobin 4/6 7.2  -Diabetes education   • Elevated liver function tests [R79.89]- improving  -appears chronic, denies abdominal pain  -US abd 1/15- hepatic steatosis, no CBD stone or dilation, s/p delmar   • Psychosis, schizophrenia, simple (HCC) [F20.89]- cont prozac, geodon; stable   • Hypothyroidism [E03.9]- cont synthroid   • Chronic UTI [N39.0]  -completed course of cipro  -Denies urinary complaints, no WBC/fever or signs of infection- continue to monitor   • Chronic suprapubic catheter (CMS-HCC) [Z93.59]- OP management   • HTN (hypertension) [I10]- continue lasix   • Chronic pain syndrome [G89.4]- continue OP regimen   • GERD (gastroesophageal reflux disease) [K21.9]- cont PPI     Foot drop- improved  -Foot drop boot per ortho tech placed  -Alternate feet    Dispo: home once improved, PT/OT eval     Labs reviewed and Medications reviewed  Andrade catheter: No Andrade (suprapubic catheter)      DVT " Prophylaxis: Enoxaparin (Lovenox)    Ulcer prophylaxis: Not indicated    Assessed for rehab: Patient was assess for and/or received rehabilitation services during this hospitalization

## 2017-04-15 NOTE — H&P
CHIEF COMPLAINT:  Cannot move arm, cannot move leg.    HISTORY OF PRESENT ILLNESS:  The patient is a 26-year-old female with a very   complicated medical history with type 2 diabetes mellitus, multiple   personality disorder, schizophrenia as well as neurogenic issues including   neurogenic bladder, suprapubic catheter in place as well as spastic   quadriparesis temporary in nature and unknown etiology with extensive   neurological workup.  She comes in with her inability of the right arm to move   as well as bilateral lower extremity weakness and numbness.  She was admitted   to the hospital and put on IV steroids.  Her hospitalization has taken longer   than thought; therefore she needs to be changed to inpatient.  She says that   her right arm is ____ better, but her bilateral lower extremities are still   not working well.  She endorses also mild fungal infection that she gets   sometimes with being on steroids.  She states usually Diflucan as well as   nystatin topical powder seems to help.  Her appetite remains good.  She denies   any nausea, vomiting, fevers or chills, denies any new skin changes, denies   any secretions from her suprapubic catheter site.    REVIEW OF SYSTEMS:  All other systems reviewed and are negative.    PAST MEDICAL HISTORY:  Multiple personality disorder.  Otherwise, all outlined   as above.    PAST SURGICAL HISTORY:  Suprapubic catheter placement.    SOCIAL HISTORY: No alcohol, tobacco, or drugs.    FAMILY HISTORY:  None.    ALLERGIES:  CEFDINIR, DEPAKOTE, ABILIFY, AMITRIPTYLINE, AMOXICILLIN,   CIPROFLOXACIN, CLINDAMYCIN, DOXYCYCLINE, ERYTHROMYCIN, FLAGYL, FLOMAX,   METFORMIN, SULFA, TAPE, VANCOMYCIN, KEFLEX, CLARITHROMYCIN, LEVAQUIN,   METRONIDAZOLE, AND VALPROIC ACID.    MEDICATIONS PRIOR TO ADMISSION:  1.  Albuterol 2 puffs every 6 hours as needed for shortness of breath.  2.  Aspirin enteric coated 81 mg daily.  3.  Atorvastatin 40 mg tablets at bedtime.  4.  Baclofen 10 mg tablets  t.i.d.  5.  Ciprofloxacin 500 mg tablets every 12 hours for 14 days.  6.  Cranberry 1000 mg b.i.d. with meals.  7.  Cyanocobalamin 1000 mcg b.i.d.  8.  Diphenhydramine 25 mg tablets b.i.d.  9.  Fentanyl 25 mcg per hour patch every 72 hours.  10.  Prozac 10 mg tablets once daily.  11.  Lasix 20 mg tablets every day.  12.  Gabapentin 600 mg t.i.d.  13.  Ivabradine 5 mg tablets b.i.d. with meals.  14.  Lactulose 10 g b.i.d.  15.  Levothyroxine 75 mcg every morning.  16.  Victoza 0.6 mg subcutaneously every day.  17.  Melatonin 10 mg tablets at bedtime.  18.  Nitroglycerin tablet 0.4 mg sublingual under tongue as needed for chest   pain.  19.  Nystatin powder applied to affected area t.i.d.  20.  Omeprazole 20 mg tablets b.i.d.  21.  Oxycodone/acetaminophen 10/325 mg 1 tablet every 6 hours as needed for   chronic pain.  22.  Phenergan 25 mg tablets every 6 hours as needed for nausea and vomiting.  23.  Ranitidine 150 mg b.i.d.  24.  Sodium bicarbonate 650 mg t.i.d.  25.  Vitamin D 65843 units every day.  26.  Ziprasidone 160 mg tablets every evening.    PHYSICAL EXAMINATION:  VITAL SIGNS:  Temperature is 37.1, heart rate 84, respiratory rate 18, oxygen   saturation 95% on 2 L nasal cannula, blood pressure 136/81.  GENERAL:  Patient is in no acute distress, speaking in full sentences, A and O   x4, pleasant and cooperative.  HEENT:  Normocephalic, atraumatic.  Pupils are equal and reactive to light.    Extraocular muscles are intact.  NECK:  Flat JVD.  CARDIOVASCULAR:  Regular rate and rhythm.  No murmurs, rubs or gallops.    Nondisplaced PMI.  LUNGS:  Clear to auscultation bilaterally.  No use of accessory muscles.  ABDOMEN:  Soft, nontender, nondistended, normoactive bowel sounds.  No   hepatosplenomegaly.  GENITOURINARY:  There is a suprapubic catheter in place with insertion site   appears very clean, dry, and intact.  There is clear urine in the Andrade bag.  EXTREMITIES:  No clubbing, cyanosis, or edema.  She is warm  peripherally, 2+   radial pulses equal and symmetric.  NEUROLOGIC:  She has decreased sensation to soft touch on bilateral lower   extremities from the knee distal, ____ normal in the proximal thighs, somewhat   decreased in the right forearm, otherwise normal on the left.  MUSCULOSKELETAL:  She has 4/5 muscle strength of the right upper extremity,   normal in the left upper extremity, she has 2/5 muscle strength bilateral   lower extremities.  PSYCHOLOGICAL:  Appropriate affect, A and O x4.  SKIN:  No obvious rashes, lesions, or excoriations.    ASSESSMENT:  1.  Tetraplegia unknown etiology, transient in nature.  2.  Hyperglycemia.  3.  Elevated liver function tests.  4.  Psychosis, schizophrenia.  5.  History of multiple personality disorder.  6.  Hypothyroidism.  7.  Chronic urinary tract infection.  8.  Suprapubic catheter in place.  9.  Hypertension.  10.  Chronic pain syndrome.  11.  Gastroesophageal reflux disease.  12.  Footdrop.    PLAN:  The patient will be maintained on neurology unit at this time,   neurology has been consulted and we are doing dose pulse steroids of IV   Solu-Medrol 1000 mg daily for a total of 4 days that seems to be improving her   neurological symptoms already.  She has had a very extensive workup for this   in the past, eventually no clear etiology, but she seems to be responsive to   steroids every time.  I do query possible psychological component.  Bilateral   lower extremities are still quite numb to touch and not really able to move.    We need to get PT, OT involved.  I am also going to do 1 dose of Diflucan.  We   will also do nystatin powder.  Her suprapubic catheter looks great.  We will   continue all other outpatient medications.  We will do no escalation of her   chronic pain meds.    CODE STATUS:  Full code.    DIET:  Diabetic.    DVT PROPHYLAXIS:  Heparin 5000 units every 8 hours.    I anticipate over 2 midnights for this patient to stay to have full relief of   her  symptoms of quadriparesis.       ____________________________________     MD JUSTA TEJEDA / CHRISTELLE    DD:  04/14/2017 17:50:55  DT:  04/14/2017 20:25:45    D#:  [id number]  Job#:  [id number]

## 2017-04-15 NOTE — CARE PLAN
Problem: Communication  Goal: The ability to communicate needs accurately and effectively will improve  Intervention: Ewing patient and significant other/support system to call light to alert staff of needs  Pt educated on using call light. Call light within reach. Hourly rounding in place.

## 2017-04-16 VITALS
TEMPERATURE: 97.6 F | WEIGHT: 270.5 LBS | SYSTOLIC BLOOD PRESSURE: 133 MMHG | HEART RATE: 95 BPM | OXYGEN SATURATION: 95 % | DIASTOLIC BLOOD PRESSURE: 74 MMHG | BODY MASS INDEX: 47.93 KG/M2 | HEIGHT: 63 IN | RESPIRATION RATE: 16 BRPM

## 2017-04-16 PROBLEM — R79.89 ELEVATED LIVER FUNCTION TESTS: Status: RESOLVED | Noted: 2017-04-13 | Resolved: 2017-04-16

## 2017-04-16 PROBLEM — G82.50 TETRAPLEGIA (HCC): Status: RESOLVED | Noted: 2017-04-13 | Resolved: 2017-04-16

## 2017-04-16 LAB
GLUCOSE BLD-MCNC: 147 MG/DL (ref 65–99)
GLUCOSE BLD-MCNC: 150 MG/DL (ref 65–99)

## 2017-04-16 PROCEDURE — A9270 NON-COVERED ITEM OR SERVICE: HCPCS | Performed by: INTERNAL MEDICINE

## 2017-04-16 PROCEDURE — 97116 GAIT TRAINING THERAPY: CPT

## 2017-04-16 PROCEDURE — G8980 MOBILITY D/C STATUS: HCPCS | Mod: CI

## 2017-04-16 PROCEDURE — 665999 HH PPS REVENUE DEBIT

## 2017-04-16 PROCEDURE — 82962 GLUCOSE BLOOD TEST: CPT

## 2017-04-16 PROCEDURE — G8979 MOBILITY GOAL STATUS: HCPCS | Mod: CI

## 2017-04-16 PROCEDURE — 700102 HCHG RX REV CODE 250 W/ 637 OVERRIDE(OP): Performed by: INTERNAL MEDICINE

## 2017-04-16 PROCEDURE — 700111 HCHG RX REV CODE 636 W/ 250 OVERRIDE (IP): Performed by: INTERNAL MEDICINE

## 2017-04-16 PROCEDURE — 700105 HCHG RX REV CODE 258: Performed by: INTERNAL MEDICINE

## 2017-04-16 PROCEDURE — A9270 NON-COVERED ITEM OR SERVICE: HCPCS | Performed by: NURSE PRACTITIONER

## 2017-04-16 PROCEDURE — 665998 HH PPS REVENUE CREDIT

## 2017-04-16 PROCEDURE — 97530 THERAPEUTIC ACTIVITIES: CPT

## 2017-04-16 PROCEDURE — 99239 HOSP IP/OBS DSCHRG MGMT >30: CPT | Performed by: INTERNAL MEDICINE

## 2017-04-16 PROCEDURE — 700102 HCHG RX REV CODE 250 W/ 637 OVERRIDE(OP): Performed by: NURSE PRACTITIONER

## 2017-04-16 RX ORDER — BISACODYL 10 MG
10 SUPPOSITORY, RECTAL RECTAL DAILY
Status: DISCONTINUED | OUTPATIENT
Start: 2017-04-16 | End: 2017-04-16 | Stop reason: HOSPADM

## 2017-04-16 RX ORDER — POLYETHYLENE GLYCOL 3350 17 G/17G
1 POWDER, FOR SOLUTION ORAL
Status: DISCONTINUED | OUTPATIENT
Start: 2017-04-16 | End: 2017-04-16 | Stop reason: HOSPADM

## 2017-04-16 RX ORDER — AMOXICILLIN 250 MG
2 CAPSULE ORAL 2 TIMES DAILY
Status: DISCONTINUED | OUTPATIENT
Start: 2017-04-16 | End: 2017-04-16 | Stop reason: HOSPADM

## 2017-04-16 RX ORDER — LACTULOSE 20 G/30ML
15 SOLUTION ORAL 3 TIMES DAILY
Status: DISCONTINUED | OUTPATIENT
Start: 2017-04-16 | End: 2017-04-16 | Stop reason: HOSPADM

## 2017-04-16 RX ADMIN — STANDARDIZED SENNA CONCENTRATE AND DOCUSATE SODIUM 2 TABLET: 8.6; 5 TABLET, FILM COATED ORAL at 09:30

## 2017-04-16 RX ADMIN — FLUOXETINE 10 MG: 10 CAPSULE ORAL at 09:30

## 2017-04-16 RX ADMIN — ASPIRIN 81 MG: 81 TABLET ORAL at 09:30

## 2017-04-16 RX ADMIN — CYANOCOBALAMIN TAB 500 MCG 1000 MCG: 500 TAB at 09:31

## 2017-04-16 RX ADMIN — OMEPRAZOLE 20 MG: 20 CAPSULE, DELAYED RELEASE ORAL at 09:30

## 2017-04-16 RX ADMIN — FAMOTIDINE 20 MG: 20 TABLET, FILM COATED ORAL at 09:31

## 2017-04-16 RX ADMIN — BISACODYL 10 MG: 10 SUPPOSITORY RECTAL at 09:31

## 2017-04-16 RX ADMIN — LACTULOSE 15 ML: 20 SOLUTION ORAL at 09:30

## 2017-04-16 RX ADMIN — ONDANSETRON 4 MG: 2 INJECTION INTRAMUSCULAR; INTRAVENOUS at 07:33

## 2017-04-16 RX ADMIN — LEVOTHYROXINE SODIUM 75 MCG: 75 TABLET ORAL at 06:10

## 2017-04-16 RX ADMIN — FUROSEMIDE 20 MG: 20 TABLET ORAL at 09:30

## 2017-04-16 RX ADMIN — OXYCODONE HYDROCHLORIDE AND ACETAMINOPHEN 2 TABLET: 10; 325 TABLET ORAL at 06:24

## 2017-04-16 RX ADMIN — HEPARIN SODIUM 5000 UNITS: 5000 INJECTION, SOLUTION INTRAVENOUS; SUBCUTANEOUS at 06:10

## 2017-04-16 RX ADMIN — IVABRADINE 5 MG: 5 TABLET, FILM COATED ORAL at 07:30

## 2017-04-16 RX ADMIN — SODIUM BICARBONATE TAB 650 MG 650 MG: 650 TAB at 09:30

## 2017-04-16 RX ADMIN — BACLOFEN 10 MG: 10 TABLET ORAL at 09:30

## 2017-04-16 RX ADMIN — OXYCODONE HYDROCHLORIDE AND ACETAMINOPHEN 2 TABLET: 10; 325 TABLET ORAL at 11:40

## 2017-04-16 RX ADMIN — GABAPENTIN 600 MG: 600 TABLET ORAL at 09:32

## 2017-04-16 RX ADMIN — SODIUM CHLORIDE 1000 MG: 9 INJECTION, SOLUTION INTRAVENOUS at 10:55

## 2017-04-16 ASSESSMENT — GAIT ASSESSMENTS
GAIT LEVEL OF ASSIST: SUPERVISED
ASSISTIVE DEVICE: FRONT WHEEL WALKER
DISTANCE (FEET): 100

## 2017-04-16 NOTE — THERAPY
"Physical Therapy Treatment completed.   Bed Mobility:  Supine to Sit: Supervised  Transfers: Sit to Stand: Supervised  Gait: Level Of Assist: Supervised with Front-Wheel Walker       Plan of Care: Patient with no further skilled PT needs in the acute care setting at this time  Discharge Recommendations: Equipment: No Equipment Needed. Post-acute therapy Discharge to home with outpatient or home health for additional skilled therapy services.     See \"Rehab Therapy-Acute\" Patient Summary Report for complete documentation.       "

## 2017-04-16 NOTE — DISCHARGE SUMMARY
"Hospital Medicine Discharge Note     Patient ID:  Kristin Balderrama  [id number]  26 y.o.female  1990    Admit Date:  4/12/2017       Discharge Date:   4/16/2017    Primary Care Provider: Torres Brody M.D.    Admitting Physician: Marky Shaffer M.D.  Discharging Physician: Estevan Hogan MD    Chief Complaint: RUE, BLE weakness    Discharge Diagnoses:     Tetraplegia (CMS-HCC)- resolved    Diabetes mellitus type 2 in obese    Elevated liver function tests- improved    Chronic UTI- treated     Chronic Medical Problems:  Past Medical History   Diagnosis Date   • Scoliosis    • Multiple personality disorder    • Chronic UTI 9/18/2010   • Heart burn    • Pain 08-15-12     back, 7/10   • History of falling    • Sinus tachycardia 10/31/2013   • Urinary incontinence    • Hypertension    • Disorder of thyroid    • Obesity    • Pneumonia 2012   • ASTHMA      when around smoke   • Breath shortness      with tachycardia   • Anginal syndrome      random chest pain especially with tachycardia   • Psychosis    • Arthritis      osteo   • PCOS (polycystic ovarian syndrome)    • Gynecological disorder      PCOS   • Renal disorder      \"kidney disease, stage 1\" nephrologist, Dr. Vallejo   • Arrhythmia      \"sinus tachycardia\", cariologist, Dr. 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   • Sleep apnea      CPAP \"pulmonary doctor took me off mid year 2016\"   • 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CMS-HCC)    • Fall        Code Status: Full Code    Hospital Summary:       Please refer to H&P by Dr Shaffer on 4/12/17 for full details.      In brief, Kristin Balderrama is a 26 y.o. female who was admitted 4/12/2017 for RUE, BLE weakness and numbness. She has had a history of episodic neurologic deficits of her lower extremities with paralysis, that resolves with IV steroids. She has undergone extensive neurologic workup inpatient and outpatient " during various episodes. She is followed outpatient by Dr Stevenson. Her prior imaging and workup with MRI, EMG, muscle biopsy, CSF analysis has all remained elusive to the cause of her symptoms. Neurology has suggested an inorganic cause to her symptoms. She does have significant psychiatric history. The patient presented with paralysis of her RUE, and BLE. She was admitted for treatment.    The patient was admitted for treatment. She was found to have foot drop, and a loss of movement of her RUE and BLE. Neurology was consulted, and recommended 4 days of IV steroids. She underwent PT/OT therapy with improvement of her symptoms. She had foot drop, that has resolved. Upon neurologic examination, she is neurologically intact and at baseline. Her symptoms have improved to baseline with 4 days of IV steroids. She has been set up for home health therapies for PT/OT.  She will follow up with her PCP and neurologist.    While hospitalized her blood sugars were high. She has been recently started on low dose Victoza, and is tolerating this well. Her Victoza was increased to gain BS control. Therefore, she is discharged in good and stable condition with close outpatient follow-up.    Consultants:      Dr Acosta- neurosurgery    Studies:    Laboratory:   Recent Labs      04/14/17   0559   WBC  7.9   RBC  3.90*   HEMOGLOBIN  12.0   HEMATOCRIT  35.6*   MCV  91.3   MCH  30.8   MCHC  33.7   RDW  44.1   PLATELETCT  202   MPV  11.5       Recent Labs      04/14/17   0559   SODIUM  135   POTASSIUM  4.3   CHLORIDE  103   CO2  25   GLUCOSE  240*   BUN  19   CREATININE  0.72   CALCIUM  9.4       Recent Labs      04/14/17   0559   ALTSGPT  99*   ASTSGOT  60*   ALKPHOSPHAT  67   TBILIRUBIN  0.4   GLUCOSE  240*      TSH 3.76    BLOOD CULTURE   Date Value Ref Range Status   03/16/2017   Final    Blood culture testing and Gram stain, if indicated, are  performed at [facility], [address], [place], [place].   Positive blood cultures are  sent to [facility], [address] [place] [address], for organism identification and  susceptibility testing.  No growth after 5 days of incubation.          Procedures/Surgeries:        None    Disposition:  Discharge home    Condition:  Stable    Instructions:   Activity: As tolerated.  Diet: diabetic  Followup:   -PCP 1 week  -[facility], as scheduled  -Neurology 2-3 weeks  -Diabetic education  Instructions:  --Check your blood sugar four times daily before meals and before bed, keep a log and take this to your PCP  -Eat a diabetic diet (carb controlled)  -Do not take insulin if your blood sugar is below 120 or if you are unable to eat  -Check your blood sugar if you feel weak, dizzy, or poor. If your blood sugar is below 70 take juice or food, and recheck. If unable to take juice/food, take glucose tablets. Recheck blood sugar after and return to the ER if still low. If unconscious, family should call 911.  Diabetic Education/Counseling Resource, call for appointment:  1. [facility] [phone]   Located at [address], [address]  2. [facility] [phone]   Located at [address], [address]  -Given instructions to return to the ER if any new or worsening symptoms, worsening condition, or failure to improve  -Call PCP for followup  -No smoking, no alcohol, no caffeine  -Encourage risk factor reduction with tobacco and alcohol abstinence, diet changes, weight loss, and exercise.     Future Appointments  Date Time Provider Department Center   5/1/2017 9:30 AM Ronny Burnette M.D. [facility]   5/1/2017 11:20 AM RUFUS Soni None   5/16/2017 1:20 PM Chencho Norman M.D. [facility] None   5/31/2017 9:00 AM ALICIA Staton   7/17/2017 10:00 AM ALICIA [facility]   7/19/2017 8:40 AM RUFUS Soni None   8/11/2017 10:00 AM Sandoval Fox M.D. [facility] None        Discharge Medications:           Medication List      CHANGE how you take these medications       Instructions    liraglutide 18 MG/3ML Sopn injection   What changed:  how much to take   Last time this was given:  0.6 mg on 4/16/2017  9:39 AM   Commonly known as:  VICTOZA    Inject 0.2 mL as instructed every day.   Dose:  1.2 mg         CONTINUE taking these medications       Instructions    albuterol 108 (90 BASE) MCG/ACT Aers inhalation aerosol   Last time this was given:  2 Puffs on 4/14/2017  4:33 PM    Inhale 2 Puffs by mouth every 6 hours as needed for Shortness of Breath.   Dose:  2 Puff       aspirin EC 81 MG Tbec   Last time this was given:  81 mg on 4/16/2017  9:30 AM   Commonly known as:  ECOTRIN    Take 1 Tab by mouth every day.   Dose:  81 mg       atorvastatin 40 MG Tabs   Last time this was given:  40 mg on 4/15/2017 10:08 PM   Commonly known as:  LIPITOR    Take 1 Tab by mouth every bedtime.   Dose:  40 mg       baclofen 10 MG Tabs   Last time this was given:  10 mg on 4/16/2017  9:30 AM   Commonly known as:  LIORESAL    Take 10 mg by mouth 3 times a day.   Dose:  10 mg       BENADRYL 25 MG Tabs   Generic drug:  diphenhydrAMINE    Take 25 mg by mouth 2 times a day. Taking with the Cipro to not have the reaction to Cipro of a rash   Dose:  25 mg       ciprofloxacin 500 MG Tabs   Commonly known as:  CIPRO    Take 1 Tab by mouth every 12 hours for 14 days.   Dose:  500 mg       Cranberry 1000 MG Caps    Take 1 Cap by mouth 2 times a day, with meals.   Dose:  1 Cap       fentanyl 25 MCG/HR Pt72   Last time this was given:  1 Patch on 4/15/2017  5:18 PM   Commonly known as:  DURAGESIC    Apply 1 Patch to skin as directed every 72 hours.   Dose:  1 Patch       fluoxetine 10 MG Caps   Last time this was given:  10 mg on 4/16/2017  9:30 AM   Commonly known as:  PROZAC    TAKE ONE CAPSULE BY MOUTH ONCE A DAY       furosemide 20 MG Tabs   Last time this was given:  20 mg on 4/16/2017  9:30 AM   Commonly  known as:  LASIX    Take 20 mg by mouth every day.   Dose:  20 mg       GRALISE 600 MG Tabs   Last time this was given:  600 mg on 4/16/2017  9:32 AM   Generic drug:  Gabapentin (Once-Daily)    Take 600 mg by mouth 3 times a day.   Dose:  600 mg       HM VITAMIN B12 500 MCG tablet   Last time this was given:  1,000 mcg on 4/16/2017  9:31 AM   Generic drug:  cyanocobalamin    Take 1,000 mcg by mouth 2 times a day.   Dose:  1000 mcg       ivabradine 5 MG Tabs tablet   Last time this was given:  5 mg on 4/16/2017  7:30 AM   Commonly known as:  CORLANOR    Take 1 Tab by mouth 2 times a day, with meals.   Dose:  5 mg       lactulose 10 GM/15ML Soln   Last time this was given:  15 mL on 4/16/2017  9:30 AM    Take 10 g by mouth 2 times a day.   Dose:  10 g       levothyroxine 75 MCG Tabs   Last time this was given:  75 mcg on 4/16/2017  6:10 AM   Commonly known as:  SYNTHROID    Take 75 mcg by mouth Every morning on an empty stomach.   Dose:  75 mcg       Melatonin 5 MG Tabs    Doctor's comments:  Takes two tabs at Bedtime (10 mg.)   Take 10 mg by mouth every bedtime.   Dose:  10 mg       nitroglycerin 0.4 MG Subl   Commonly known as:  NITROSTAT    Place 1 Tab under tongue as needed for Chest Pain.   Dose:  0.4 mg       nystatin Powd   Last time this was given:  1 Application on 4/15/2017  9:00 PM   Commonly known as:  MYCOSTATIN    Apply 1 Application to affected area(s) 3 times a day.   Dose:  1 Application       omeprazole 20 MG delayed-release capsule   Last time this was given:  20 mg on 4/16/2017  9:30 AM   Commonly known as:  PRILOSEC    Take 20 mg by mouth 2 times a day.   Dose:  20 mg       oxycodone-acetaminophen  MG Tabs   Last time this was given:  2 Tabs on 4/16/2017 11:40 AM   Commonly known as:  PERCOCET-10    Take 2 Tabs by mouth every 6 hours.   Dose:  2 Tab       promethazine 25 MG Tabs   Commonly known as:  PHENERGAN    Take 1 Tab by mouth every 6 hours as needed for Nausea/Vomiting.   Dose:  25 mg        ranitidine 150 MG Tabs   Commonly known as:  ZANTAC    Take 150 mg by mouth 2 times a day.   Dose:  150 mg       sodium bicarbonate 325 MG Tabs   Last time this was given:  650 mg on 4/16/2017  9:30 AM    Take 2 Tabs by mouth 3 times a day.   Dose:  650 mg       vitamin D (Ergocalciferol) 10477 UNITS Caps capsule   Last time this was given:  50,000 Units on 4/14/2017  8:05 AM   Commonly known as:  DRISDOL    Take 50,000 Units by mouth every Friday.   Dose:  56738 Units       ziprasidone 80 MG Caps   Last time this was given:  160 mg on 4/15/2017  5:18 PM   Commonly known as:  GEODON    Take 160 mg by mouth every evening. DO NOT GIVE PAST 1700   Dose:  160 mg             Total time of the discharge process exceeds 36 minutes. This included face to face with the patient, medication reconciliation, care co ordination with nursing involved in patient care and discussion and co ordination with case management.     Please CC the above physicians    VANIA Sumner  4/16/2017  11:37 AM

## 2017-04-16 NOTE — DISCHARGE INSTRUCTIONS
Activity: As tolerated.   Diet: diabetic   Followup:   -PCP 1 week   -[facility], as scheduled   -Neurology 2-3 weeks   -Diabetic education     Instructions:   --Check your blood sugar four times daily before meals and before bed, keep a log and take this to your PCP   -Eat a diabetic diet (carb controlled)   -Do not take insulin if your blood sugar is below 120 or if you are unable to eat   -Check your blood sugar if you feel weak, dizzy, or poor. If your blood sugar is below 70 take juice or food, and recheck. If unable to take juice/food, take glucose tablets. Recheck blood sugar after and return to the ER if still low. If unconscious, family should call 911.     Diabetic Education/Counseling Resource, call for appointment:   1. [place] [phone]   Located at [address] [facility], [address]   2. [facility] [phone]   Located at [address], [address]   -Given instructions to return to the ER if any new or worsening symptoms, worsening condition, or failure to improve   -Call PCP for followup   -No smoking, no alcohol, no caffeine   -Encourage risk factor reduction with tobacco and alcohol abstinence, diet changes, weight loss, and exercise.      Discharge Instructions    Discharged to home by car with relative. Discharged via wheelchair, hospital escort: Yes.  Special equipment needed: Oxygen and Wheelchair HAS THIS AT HOME    Be sure to schedule a follow-up appointment with your primary care doctor or any specialists as instructed.     Discharge Plan:   Influenza Vaccine Indication: Not indicated: Previously immunized this influenza season and > 8 years of age    I understand that a diet low in cholesterol, fat, and sodium is recommended for good health. Unless I have been given specific instructions below for another diet, I accept this instruction as my diet prescription.   Other diet: DIABETIC DIET     Special Instructions: None    · Is patient  discharged on Warfarin / Coumadin?   No     · Is patient Post Blood Transfusion?  No        Depression / Suicide Risk    As you are discharged from this [facility] facility, it is important to learn how to keep safe from harming yourself.    Recognize the warning signs:  · Abrupt changes in personality, positive or negative- including increase in energy   · Giving away possessions  · Change in eating patterns- significant weight changes-  positive or negative  · Change in sleeping patterns- unable to sleep or sleeping all the time   · Unwillingness or inability to communicate  · Depression  · Unusual sadness, discouragement and loneliness  · Talk of wanting to die  · Neglect of personal appearance   · Rebelliousness- reckless behavior  · Withdrawal from people/activities they love  · Confusion- inability to concentrate     If you or a loved one observes any of these behaviors or has concerns about self-harm, here's what you can do:  · Talk about it- your feelings and reasons for harming yourself  · Remove any means that you might use to hurt yourself (examples: pills, rope, extension cords, firearm)  · Get professional help from the community (Mental Health, Substance Abuse, psychological counseling)  · Do not be alone:Call your Safe Contact- someone whom you trust who will be there for you.  · Call your local CRISIS HOTLINE 834-2849 or 695-384-0797  · Call your local Children's Mobile Crisis Response Team [place] (179) 875-7639 or www.[employer]law  · Call the toll free National Suicide Prevention Hotlines   · National Suicide Prevention Lifeline 304-549-BRZU (2508)  · National Hope Line Network 800-SUICIDE (110-4056)

## 2017-04-17 ENCOUNTER — PATIENT OUTREACH (OUTPATIENT)
Dept: HEALTH INFORMATION MANAGEMENT | Facility: OTHER | Age: 27
End: 2017-04-17

## 2017-04-17 ENCOUNTER — HOME CARE VISIT (OUTPATIENT)
Dept: HOME HEALTH SERVICES | Facility: HOME HEALTHCARE | Age: 27
End: 2017-04-17
Payer: MEDICARE

## 2017-04-17 ENCOUNTER — APPOINTMENT (OUTPATIENT)
Dept: ONCOLOGY | Facility: MEDICAL CENTER | Age: 27
End: 2017-04-17
Attending: PSYCHIATRY & NEUROLOGY
Payer: MEDICARE

## 2017-04-17 VITALS
TEMPERATURE: 96.8 F | WEIGHT: 262.81 LBS | HEIGHT: 64 IN | RESPIRATION RATE: 20 BRPM | HEART RATE: 71 BPM | SYSTOLIC BLOOD PRESSURE: 150 MMHG | DIASTOLIC BLOOD PRESSURE: 85 MMHG | BODY MASS INDEX: 44.87 KG/M2

## 2017-04-17 PROCEDURE — 665999 HH PPS REVENUE DEBIT

## 2017-04-17 PROCEDURE — 665998 HH PPS REVENUE CREDIT

## 2017-04-17 PROCEDURE — G0162 HHC RN E&M PLAN SVS, 15 MIN: HCPCS

## 2017-04-17 SDOH — ECONOMIC STABILITY: HOUSING INSECURITY
HOME SAFETY: .OXYGEN SAFETY RISK ASSESSMENT PERFORMED. PATIENT PT WAS GIVEN A NO SMOKING SIGN AND PROVIDED EDUCATION ABOUT WHY IT IS IMPORTANT TO PLACE ONE.PT WAS GIVEN A NO SMOKING SIGN AND PROVIDED EDUCATION ABOUT WHY IT IS IMPORTANT TO PLACE ONE. PATIENT DOES

## 2017-04-17 SDOH — ECONOMIC STABILITY: HOUSING INSECURITY: UNSAFE COOKING RANGE AREA: 0

## 2017-04-17 SDOH — ECONOMIC STABILITY: HOUSING INSECURITY: UNSAFE APPLIANCES: 0

## 2017-04-17 SDOH — ECONOMIC STABILITY: HOUSING INSECURITY
HOME SAFETY: HAVE A WORKING FIRE EXTINGUISHER PRESENT IN THE HOME. SMOKE ALARMS ARE PRESENT AND FUNCTIONAL ON EACH LEVEL OF THE HOME. PATIENT DOES HAVE A FIRE ESCAPE PLAN DEVELOPED. PATIENT DOES NOT HAVE FLAMMABLE MATERIALS PRESENT IN THE HOME PRESENTING A FIRE H

## 2017-04-17 SDOH — ECONOMIC STABILITY: HOUSING INSECURITY: HOME SAFETY: AZARD. NO EVIDENCE FOUND OF SMOKING MATERIALS PRESENT IN THE HOME.

## 2017-04-17 ASSESSMENT — ACTIVITIES OF DAILY LIVING (ADL)
HOME_HEALTH_OASIS: 02
HOME_HEALTH_OASIS: 01
OASIS_M1830: 03

## 2017-04-17 ASSESSMENT — ENCOUNTER SYMPTOMS
SHORTNESS OF BREATH: T
VOMITING: OCCASSIONALLY
SEVERE DYSPNEA: 1
DEBILITATING PAIN: 1
MENTAL STATUS CHANGE: 0

## 2017-04-18 ENCOUNTER — OFFICE VISIT (OUTPATIENT)
Dept: MEDICAL GROUP | Facility: MEDICAL CENTER | Age: 27
End: 2017-04-18
Payer: MEDICARE

## 2017-04-18 ENCOUNTER — TELEPHONE (OUTPATIENT)
Dept: NEUROLOGY | Facility: MEDICAL CENTER | Age: 27
End: 2017-04-18

## 2017-04-18 ENCOUNTER — HOSPITAL ENCOUNTER (OUTPATIENT)
Dept: LAB | Facility: MEDICAL CENTER | Age: 27
End: 2017-04-18
Attending: INTERNAL MEDICINE
Payer: MEDICARE

## 2017-04-18 ENCOUNTER — HOME CARE VISIT (OUTPATIENT)
Dept: HOME HEALTH SERVICES | Facility: HOME HEALTHCARE | Age: 27
End: 2017-04-18
Payer: MEDICARE

## 2017-04-18 VITALS
RESPIRATION RATE: 16 BRPM | BODY MASS INDEX: 44.3 KG/M2 | OXYGEN SATURATION: 95 % | SYSTOLIC BLOOD PRESSURE: 126 MMHG | WEIGHT: 250 LBS | DIASTOLIC BLOOD PRESSURE: 74 MMHG | TEMPERATURE: 97.4 F | HEART RATE: 83 BPM | HEIGHT: 63 IN

## 2017-04-18 DIAGNOSIS — R30.0 DYSURIA: ICD-10-CM

## 2017-04-18 DIAGNOSIS — E11.69 DIABETES MELLITUS TYPE 2 IN OBESE (HCC): ICD-10-CM

## 2017-04-18 DIAGNOSIS — E66.9 DIABETES MELLITUS TYPE 2 IN OBESE (HCC): ICD-10-CM

## 2017-04-18 LAB
APPEARANCE UR: CLEAR
BILIRUB UR QL STRIP.AUTO: NEGATIVE
COLOR UR: ABNORMAL
EKG IMPRESSION: NORMAL
GLUCOSE UR STRIP.AUTO-MCNC: NEGATIVE MG/DL
KETONES UR STRIP.AUTO-MCNC: NEGATIVE MG/DL
LEUKOCYTE ESTERASE UR QL STRIP.AUTO: NEGATIVE
MICRO URNS: ABNORMAL
NITRITE UR QL STRIP.AUTO: NEGATIVE
PH UR STRIP.AUTO: 6.5 [PH]
PROT UR QL STRIP: NEGATIVE MG/DL
RBC # URNS HPF: >150 /HPF
RBC UR QL AUTO: ABNORMAL
SP GR UR STRIP.AUTO: 1.01

## 2017-04-18 PROCEDURE — 99214 OFFICE O/P EST MOD 30 MIN: CPT | Performed by: INTERNAL MEDICINE

## 2017-04-18 PROCEDURE — 81001 URINALYSIS AUTO W/SCOPE: CPT

## 2017-04-18 PROCEDURE — 3045F PR MOST RECENT HEMOGLOBIN A1C LEVEL 7.0-9.0%: CPT | Performed by: INTERNAL MEDICINE

## 2017-04-18 PROCEDURE — 665999 HH PPS REVENUE DEBIT

## 2017-04-18 PROCEDURE — 1111F DSCHRG MED/CURRENT MED MERGE: CPT | Performed by: INTERNAL MEDICINE

## 2017-04-18 PROCEDURE — 665998 HH PPS REVENUE CREDIT

## 2017-04-18 PROCEDURE — G8432 DEP SCR NOT DOC, RNG: HCPCS | Performed by: INTERNAL MEDICINE

## 2017-04-18 PROCEDURE — G8419 CALC BMI OUT NRM PARAM NOF/U: HCPCS | Performed by: INTERNAL MEDICINE

## 2017-04-18 PROCEDURE — 1036F TOBACCO NON-USER: CPT | Performed by: INTERNAL MEDICINE

## 2017-04-18 RX ORDER — BACLOFEN 10 MG/1
10 TABLET ORAL 3 TIMES DAILY
Qty: 90 TAB | Refills: 6 | Status: SHIPPED | OUTPATIENT
Start: 2017-04-18 | End: 2017-04-18 | Stop reason: SDUPTHER

## 2017-04-18 RX ORDER — BACLOFEN 10 MG/1
10 TABLET ORAL 3 TIMES DAILY
Qty: 90 TAB | Refills: 6 | Status: SHIPPED | OUTPATIENT
Start: 2017-04-18 | End: 2018-02-08 | Stop reason: SDUPTHER

## 2017-04-18 NOTE — PROGRESS NOTES
CC: Follow-up hospitalization and diabetes.    HPI:   Kristin presents today with the following.    1. Diabetes mellitus type 2 in obese (CMS-HCC)  Recent restarted on Victoza she is holding it for fears of hypoglycemia. She reports her sugars range between 130 - 260 without anything less than 70. She does have baseline nausea but is not increased since starting medication.    2. Dysuria  She is complaining of color changes to her urine. Last urinalysis did not show any signs of infection. Has no fevers or chills.      Patient Active Problem List    Diagnosis Date Noted   • Paraparesis of both lower limbs (CMS-HCC) 01/24/2017     Priority: High   • Paralysis (CMS-HCC) 10/27/2016     Priority: High   • Sinus tachycardia 10/31/2013     Priority: High   • Diabetes mellitus type 2 in obese (CMS-HCC) 04/13/2017     Priority: Medium   • Chronic inflammatory arthritis 05/23/2016     Priority: Medium   • Depression 10/28/2016     Priority: Low   • Schizophrenia (CMS-HCC) 10/27/2016     Priority: Low   • Psychosis, schizophrenia, simple ([facility]) 09/29/2016     Priority: Low     Class: Chronic   • TONYA on CPAP 03/07/2016     Priority: Low   • Acquired hypothyroidism 11/23/2015     Priority: Low   • PCOS (polycystic ovarian syndrome) 11/23/2015     Priority: Low   • Progressive focal motor weakness 06/28/2015     Priority: Low   • Fatty liver disease, nonalcoholic 01/19/2015     Priority: Low   • Anxiety 12/16/2014     Priority: Low   • Knee pain, right 02/13/2014     Priority: Low   • Neurogenic bladder 04/02/2011     Priority: Low   • Chronic UTI 09/18/2010     Priority: Low   • Multiple personality disorder 09/18/2010     Priority: Low   • On home oxygen therapy 04/15/2017   • Chest pain 03/30/2017   • Weakness of right upper extremity 02/23/2017   • Leg weakness, bilateral 02/22/2017   • Chronic suprapubic catheter (CMS-HCC) 02/16/2017   • Weakness 01/22/2017   • Leg weakness 01/04/2017   • Hypovitaminosis D 11/29/2016   • HTN  (hypertension) 11/01/2016   • Chronic pain syndrome 10/27/2016   • Bowel and bladder incontinence 10/27/2016   • Galactorrhea 07/22/2016   • Elevated sedimentation rate 06/27/2016   • Weakness of both lower extremities 06/22/2016   • Right flank pain 06/06/2016   • Vitamin D deficiency 05/21/2016   • Morbidly obese (CMS-HCC) 03/07/2016   • Conversion disorder 03/07/2016   • Scoliosis 03/07/2016   • GERD (gastroesophageal reflux disease) 03/07/2016   • Peripheral neuropathy (CMS-HCC) 03/06/2016   • H/O prior ablation treatment 02/10/2016       Current Outpatient Prescriptions   Medication Sig Dispense Refill   • aspirin EC (ECOTRIN) 81 MG Tablet Delayed Response Take 1 Tab by mouth every day. 30 Tab 6   • Blood Glucose Monitoring Suppl SUPPLIES Misc Test strips order: Test strips for accucheck meter. Sig: use qday 50 Each 11   • liraglutide (VICTOZA) 18 MG/3ML Solution Pen-injector injection Inject 0.3 mL as instructed every day. 3 PEN 11   • baclofen (LIORESAL) 10 MG Tab Take 1 Tab by mouth 3 times a day. 90 Tab 6   • CRANBERRY PO Take 50,400 mg by mouth 2 times a day.     • non-formulary med Inhale 2 L by mouth Continuous.     • Gabapentin, Once-Daily, (GRALISE) 600 MG Tab Take 600 mg by mouth 3 times a day.     • ivabradine (CORLANOR) 5 MG Tab tablet Take 1 Tab by mouth 2 times a day, with meals. 60 Tab 6   • fluoxetine (PROZAC) 10 MG Cap TAKE ONE CAPSULE BY MOUTH ONCE A DAY 30 Cap 2   • furosemide (LASIX) 20 MG Tab Take 20 mg by mouth every day.     • ranitidine (ZANTAC) 150 MG Tab Take 150 mg by mouth 2 times a day.     • ciprofloxacin (CIPRO) 500 MG Tab Take 1 Tab by mouth every 12 hours for 14 days. (Patient not taking: Reported on 4/17/2017) 28 Tab 0   • oxycodone-acetaminophen (PERCOCET-10)  MG Tab Take 2 Tabs by mouth every 6 hours.     • nitroglycerin (NITROSTAT) 0.4 MG SL Tab Place 1 Tab under tongue as needed for Chest Pain. 25 Tab 1   • nystatin (MYCOSTATIN) Powder Apply 1 Application to affected  area(s) 3 times a day. 1 Bottle 2   • Cranberry 1000 MG Cap Take 1 Cap by mouth 2 times a day, with meals. (Patient not taking: Reported on 4/17/2017) 60 Cap 3   • Melatonin 5 MG Tab Take 10 mg by mouth every bedtime.     • ziprasidone (GEODON) 80 MG Cap Take 160 mg by mouth every evening. DO NOT GIVE PAST 1700     • fentanyl (DURAGESIC) 25 MCG/HR PATCH 72 HR Apply 1 Patch to skin as directed every 72 hours.     • lactulose 10 GM/15ML Solution Take 10 g by mouth 2 times a day.     • vitamin D, Ergocalciferol, (DRISDOL) 27585 UNITS Cap capsule Take 50,000 Units by mouth every Friday.     • promethazine (PHENERGAN) 25 MG Tab Take 1 Tab by mouth every 6 hours as needed for Nausea/Vomiting. 30 Tab 0   • albuterol 108 (90 BASE) MCG/ACT Aero Soln inhalation aerosol Inhale 2 Puffs by mouth every 6 hours as needed for Shortness of Breath.     • cyanocobalamin (HM VITAMIN B12) 500 MCG tablet Take 1,000 mcg by mouth 2 times a day.     • sodium bicarbonate 325 MG Tab Take 2 Tabs by mouth 3 times a day.     • levothyroxine (SYNTHROID) 75 MCG Tab Take 75 mcg by mouth Every morning on an empty stomach.     • omeprazole (PRILOSEC) 20 MG delayed-release capsule Take 20 mg by mouth 2 times a day.       No current facility-administered medications for this visit.         Allergies as of 04/18/2017 - Joe as Reviewed 04/18/2017   Allergen Reaction Noted   • Cefdinir Shortness of Breath and Itching 03/01/2016   • Depakote [divalproex sodium] Unspecified 06/14/2010   • Abilify Unspecified 01/17/2013   • Amitriptyline Unspecified 10/31/2013   • Amoxicillin Rash 09/18/2010   • Ciprofloxacin Rash 12/17/2009   • Clindamycin Nausea 02/02/2011   • Doxycycline Vomiting and Nausea 08/15/2012   • Ees [erythromycin] Vomiting and Nausea 08/28/2010   • Flagyl [metronidazole hcl] Unspecified 03/31/2011   • Flomax [tamsulosin hydrochloride] Swelling 09/24/2009   • Metformin Unspecified 07/23/2013   • Sulfa drugs Hives and Rash 09/18/2010   • Tape  "Rash 08/15/2012   • Vancomycin Itching 07/10/2016   • Cephalexin [keflex] Rash 01/01/2017   • Erythromycin  03/30/2017   • Levofloxacin Unspecified 10/27/2016   • Metronidazole  03/30/2017   • Valproic acid  03/30/2017        ROS: As per HPI.    /74 mmHg  Pulse 83  Temp(Src) 36.3 °C (97.4 °F)  Resp 16  Ht 1.6 m (5' 3\")  Wt 113.399 kg (250 lb)  BMI 44.30 kg/m2  SpO2 95%  LMP 06/13/2016    Physical Exam:  Gen:         Alert and oriented, No apparent distress.  Neck:        No Lymphadenopathy or Bruits.  Lungs:     Clear to auscultation bilaterally  CV:          Regular rate and rhythm. No murmurs, rubs or gallops.               Ext:          No clubbing, cyanosis, edema.      Assessment and Plan.   26 y.o. female with the following issues.    1. Diabetes mellitus type 2 in obese (CMS-[facility])  Discussion about Victoza not causing hypoglycemia she'll advance dose 1.2 in follow-up in 3 months with repeat A1c.  - Blood Glucose Monitoring Suppl SUPPLIES Misc; Test strips order: Test strips for accucheck meter. Sig: use qday  Dispense: 50 Each; Refill: 11    2. Dysuria  Checking UA.  - URINALYSIS; Future        "

## 2017-04-18 NOTE — MR AVS SNAPSHOT
"        Kristin Balderrama   2017 10:20 AM   Office Visit   MRN: [id number]    Department:  [address] [place] [address]   Dept Phone:  [phone]    Description:  Female : 1990   Provider:  Torres Brody M.D.           Reason for Visit     Hospital Follow-up Fall, legs and right arm would not move      Allergies as of 2017     Allergen Noted Reactions    Cefdinir 2016   Shortness of Breath, Itching    Tolerated 17    Depakote [Divalproex Sodium] 2010   Unspecified    Muscle spasms/muscle pain and weakness      Abilify 2013   Unspecified    Headaches/muscle twitching      Amitriptyline 10/31/2013   Unspecified    Headaches      Amoxicillin 2010   Rash    Pt states \"I get a rash\".      Ciprofloxacin 2009   Rash    Pt states \"I get a rash\".      Clindamycin 2011   Nausea    Even with food      Doxycycline 08/15/2012   Vomiting, Nausea    RXN=unknown    Ees [Erythromycin] 2010   Vomiting, Nausea    Flagyl [Metronidazole Hcl] 2011   Unspecified    \"eye problems\"      Flomax [Tamsulosin Hydrochloride] 2009   Swelling    Metformin 2013   Unspecified    Increased lactic acid       Sulfa Drugs 2010   Hives, Rash    RXN=since childhood    Tape 08/15/2012   Rash    Tears skin off   coban with Tegaderm tape ok  RXN=ongoing    Vancomycin 07/10/2016   Itching    Pt becomes flushed in face and chest.   RXN=7/10/16    Cephalexin [Keflex] 2017   Rash    Pt states she gets a rash with this medication    Erythromycin 2017       Levofloxacin 10/27/2016   Unspecified    Leg muscle cramps    Metronidazole 2017       Valproic Acid 2017         You were diagnosed with     Diabetes mellitus type 2 in obese (CMS-HCC)   [052537]       Dysuria   [788.1.ICD-9-CM]         Vital Signs     Blood Pressure Pulse Temperature Respirations Height Weight    126/74 mmHg 83 36.3 °C (97.4 °F) 16 1.6 m (5' 3\") 113.399 kg (250 lb)    Body Mass Index " Oxygen Saturation Last Menstrual Period Smoking Status          44.30 kg/m2 95% 06/13/2016 Passive Smoke Exposure - Never Smoker        Basic Information     Date Of Birth Sex Race Ethnicity Preferred Language    1990 Female White Non- English      Your appointments     Apr 18, 2017 11:30 AM   DIETICIAN-HH-HOME VIS/ASSESS with Zohra Franco RD   [facility] (--)    [address]  [place] [address]   [phone]            Apr 20, 2017 10:00 AM   PT-HH-OASIS RESUMPTION with Abhishek Baker P.T.   [facility] (--)    [address]  [place] [address]   [phone]            Apr 20, 2017 To Be Determined   SN-HH-HOME VISIT with Shelbi Ruby R.N.   [facility] (--)    [address]  [place] [address]   [phone]            Apr 21, 2017 To Be Determined   SLP-HH-INITIAL EVALUATION with AMANDA Villegas   [facility] (--)    [address]  [place] [address]   [phone]            Apr 25, 2017 To Be Determined   SN-HH-HOME VISIT with Shelbi Ruby R.N.   [facility] (--)    [address]  [place] [address]   [phone]            Apr 27, 2017 To Be Determined   SN-HH-OASIS D/C+LPN/HHA SUP with Shelbi Ruby R.N.   [facility] (--)    [address]  [place] [address]   [phone]            May 01, 2017  9:30 AM   Follow Up Med Management with Ronny Burnette M.D.   [facility] [address] [place] [address] ([facility])    [address] [facility]  [address]  [place] [address]   [phone]            May 01, 2017 11:20 AM   FOLLOW UP with Torres Kumar M.D.   [facility] (--)    [address] [place] [address] [address]  [place] [id number] [phone]            May 16, 2017  1:20 PM   New Patient with Chencho Norman M.D.   [facility] (--)    [address] [facility] [place] [id number] [phone]           Please bring enclosed paperwork completed along with your insurance card and  photo ID.            May 31, 2017  9:00 AM   Individual Therapy with Blanca Brantley L.C.S.W.   [facility]    [address]  [address]  [place] [id number] [phone]            Jul 17, 2017 10:00 AM   Individual Therapy with Blanca Brantley L.C.S.W.   [facility] ([place]    [address]  [address]  [place] [id number] [phone]            Jul 18, 2017  9:40 AM   Established Patient with Torres Brody M.D.   [facility] [address]    [address]  [place] [id number] [phone]           You will be receiving a confirmation call a few days before your appointment from our automated call confirmation system.            Jul 19, 2017  8:40 AM   FOLLOW UP with Torres Kumar M.D.   [facility] (--)    [address]  [place] [address] [phone]            Aug 11, 2017 10:00 AM   Follow Up Visit with Sandoval Fox M.D.   [facility] (--)    [address]  [place] [address] [phone]           You will be receiving a confirmation call a few days before your appointment from our automated call confirmation system.              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 Low  9/18/2010 - Present    Neurogenic bladder N31.9 Low  4/2/2011 - Present    Sinus tachycardia (Chronic) R00.0 High  10/31/2013 - Present    Knee pain, right M25.561 Low  2/13/2014 - Present    Anxiety F41.9 Low  12/16/2014 - Present    Fatty liver disease, nonalcoholic K76.0 Low  1/19/2015 - Present    Progressive focal motor weakness M62.81 Low  6/28/2015 - Present    Acquired hypothyroidism E03.9 Low  11/23/2015 - Present    PCOS (polycystic ovarian syndrome) E28.2 Low  11/23/2015 - Present    H/O prior ablation treatment Z98.890   2/10/2016 - Present       Peripheral neuropathy (CMS-[facility]) (Chronic) G62.9   3/6/2016 - Present    TONYA on CPAP G47.33 Low  3/7/2016 - Present    Morbidly obese (CMS-HCC) E66.01   3/7/2016 - Present    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   3/7/2016 - Present    Scoliosis M41.9   3/7/2016 - Present    GERD (gastroesophageal reflux disease) (Chronic) K21.9   3/7/2016 - Present    Vitamin D deficiency E55.9   5/21/2016 - Present    Chronic inflammatory arthritis (Chronic) M19.90 Medium  5/23/2016 - Present    Right flank pain R10.9   6/6/2016 - Present    Weakness of both lower extremities M62.81   6/22/2016 - Present    Elevated sedimentation rate R70.0   6/27/2016 - Present    Galactorrhea O92.6   7/22/2016 - Present    Psychosis, schizophrenia, simple ([facility]) (Chronic) F20.89 Low Chronic 9/29/2016 - Present    Schizophrenia (CMS-HCC) F20.9 Low  10/27/2016 - Present    Paralysis (CMS-HCC) G83.9 High  10/27/2016 - Present    Chronic pain syndrome (Chronic) G89.4   10/27/2016 - Present    Bowel and bladder incontinence R32, R15.9   10/27/2016 - Present    Depression F32.9 Low  10/28/2016 - Present    HTN (hypertension) (Chronic) I10   11/1/2016 - Present    Hypovitaminosis D E55.9   11/29/2016 - Present    Leg weakness M62.81   1/4/2017 - Present    Weakness R53.1   1/22/2017 - Present    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 High  1/24/2017 - Present    Chronic suprapubic catheter (CMS-HCC) (Chronic) Z93.59   2/16/2017 - Present    Leg weakness, bilateral M62.81   2/22/2017 - Present    Weakness of right upper extremity M62.81   2/23/2017 - Present    Chest pain R07.9   3/30/2017 - Present    Diabetes mellitus type 2 in obese (CMS-HCC) E11.9, E66.9 Medium  4/13/2017 - Present    On home oxygen therapy (Chronic) Z99.81   4/15/2017 - Present      Health Maintenance        Date Due Completion Dates    DIABETES MONOFILAMENT / LE EXAM 4/13/1990 ---    IMM HEP A VACCINE (1 of 2 - Standard Series) 10/13/1990 ---    IMM VARICELLA  "(CHICKENPOX) VACCINE (1 of 2 - 2 Dose Adolescent Series) 10/13/2002 ---    RETINAL SCREENING 10/13/2007 ---    URINE ACR / MICROALBUMIN 10/13/2007 ---    A1C SCREENING 10/6/2017 4/6/2017, 12/7/2016, 10/14/2016, 3/9/2016, 9/5/2014    FASTING LIPID PROFILE 3/7/2018 3/7/2017, 2/24/2017, 12/7/2016, 10/28/2016, 10/14/2016, 3/21/2014    SERUM CREATININE 4/14/2018 4/14/2017, 4/13/2017, 4/12/2017, 4/5/2017, 3/27/2017, 3/18/2017, 3/17/2017, 3/16/2017, 3/11/2017, 3/10/2017, 3/9/2017, 3/7/2017, 2/25/2017, 2/24/2017, 2/23/2017, 2/22/2017, 1/30/2017, 1/27/2017, 1/25/2017, 1/22/2017, 1/22/2017, 1/18/2017, 1/18/2017, 12/7/2016, 12/6/2016, 11/4/2016, 11/3/2016, 11/2/2016, 11/1/2016, 10/28/2016, 10/27/2016, 10/14/2016, 10/4/2016, 10/3/2016, 9/29/2016, 8/16/2016, 7/28/2016, 7/28/2016, 7/10/2016, 6/25/2016, 6/23/2016, 6/22/2016, 6/7/2016, 5/18/2016, 4/19/2016, 4/3/2016, 3/30/2016, 3/29/2016, 3/28/2016, 3/14/2016, 3/10/2016, 3/9/2016, 3/7/2016, 3/6/2016, 2/15/2016, 2/12/2016, 1/29/2016, 1/28/2016, 1/26/2016, 11/28/2015, 11/27/2015, 11/23/2015, 11/22/2015, 7/9/2015, 7/8/2015, 6/27/2015, 4/14/2015, 3/23/2015, 2/18/2015, 10/27/2014, 9/5/2014, 3/21/2014, 12/24/2013, 1/12/2013, 8/24/2012, 8/23/2012, 8/22/2012, 8/15/2012, 4/21/2012, 4/20/2012, 4/19/2012, 9/17/2011, 4/3/2011, 4/2/2011, 3/31/2011, 9/20/2010, 9/19/2010, 9/18/2010, 8/28/2010, 7/20/2010, 1/30/2007    PAP SMEAR 7/22/2019 7/22/2016 (Postponed), 2/19/2013 (N/S)    Override on 7/22/2016: Postponed (per pt was told could \"skip\" 2016)    Override on 2/19/2013: (N/S) ([place])    IMM DTaP/Tdap/Td Vaccine (7 - Td) 8/6/2022 8/6/2012, 9/18/2010, 3/3/1994, 5/17/1991, 5/10/1990, 3/23/1990, 1990    COLONOSCOPY 9/25/2023 9/25/2013            Current Immunizations     Dtap Vaccine 3/3/1994, 5/17/1991, 5/10/1990, 3/23/1990, 1990    HIB Vaccine(PEDVAX) 1/11/1991    HPV Quadrivalent Vaccine (GARDASIL) 10/27/2011, 5/27/2011, 3/1/2011    Hepatitis B Vaccine Non-Recombivax (Ped/Adol) " 1/28/2004, 10/28/2003, 10/29/1999    Influenza TIV (IM) 9/5/2013, 12/7/2011, 11/7/2011    Influenza Vaccine Adult HD 10/5/2016    MMR Vaccine 3/3/1994, 1/11/1991    OPV - Historical Data 3/3/1994, 5/17/1991, 5/10/1990, 3/23/1990, 1990    Pneumococcal Vaccine (PCV7) Historical Data 11/8/2015    Pneumococcal polysaccharide vaccine (PPSV-23) 1/23/2017  5:35 PM    TD Vaccine 9/18/2010  4:45 PM    Tdap Vaccine 8/6/2012      Below and/or attached are the medications your provider expects you to take. Review all of your home medications and newly ordered medications with your provider and/or pharmacist. Follow medication instructions as directed by your provider and/or pharmacist. Please keep your medication list with you and share with your provider. Update the information when medications are discontinued, doses are changed, or new medications (including over-the-counter products) are added; and carry medication information at all times in the event of emergency situations     Allergies:  CEFDINIR - Shortness of Breath,Itching     DEPAKOTE - Unspecified     ABILIFY - Unspecified     AMITRIPTYLINE - Unspecified     AMOXICILLIN - Rash     CIPROFLOXACIN - Rash     CLINDAMYCIN - Nausea     DOXYCYCLINE - Vomiting,Nausea     EES - Vomiting,Nausea     FLAGYL - Unspecified     FLOMAX - Swelling     METFORMIN - Unspecified     SULFA DRUGS - Hives,Rash     TAPE - Rash     VANCOMYCIN - Itching     CEPHALEXIN - Rash     ERYTHROMYCIN - (reactions not documented)     LEVOFLOXACIN - Unspecified     METRONIDAZOLE - (reactions not documented)     VALPROIC ACID - (reactions not documented)               Medications  Valid as of: April 18, 2017 - 10:29 AM    Generic Name Brand Name Tablet Size Instructions for use    Albuterol Sulfate (Aero Soln) albuterol 108 (90 BASE) MCG/ACT Inhale 2 Puffs by mouth every 6 hours as needed for Shortness of Breath.        Aspirin (Tablet Delayed Response) ECOTRIN 81 MG Take 1 Tab by mouth every day.         Baclofen (Tab) LIORESAL 10 MG Take 1 Tab by mouth 3 times a day.        Blood Glucose Monitoring Suppl (Misc) Blood Glucose Monitoring Suppl SUPPLIES Test strips order: Test strips for accucheck meter. Sig: use qday        Ciprofloxacin HCl (Tab) CIPRO 500 MG Take 1 Tab by mouth every 12 hours for 14 days.        Cranberry (Cap) Cranberry 1000 MG Take 1 Cap by mouth 2 times a day, with meals.        Cranberry   Take 50,400 mg by mouth 2 times a day.        Cyanocobalamin (Tab) vitamin b12 500 MCG Take 1,000 mcg by mouth 2 times a day.        Ergocalciferol (Cap) DRISDOL 68950 UNITS Take 50,000 Units by mouth every Friday.        FentaNYL (PATCH 72 HR) DURAGESIC 25 MCG/HR Apply 1 Patch to skin as directed every 72 hours.        FLUoxetine HCl (Cap) PROZAC 10 MG TAKE ONE CAPSULE BY MOUTH ONCE A DAY        Furosemide (Tab) LASIX 20 MG Take 20 mg by mouth every day.        Gabapentin (Once-Daily) (Tab) Gabapentin (Once-Daily) 600 MG Take 600 mg by mouth 3 times a day.        Ivabradine HCl (Tab) CORLANOR 5 MG Take 1 Tab by mouth 2 times a day, with meals.        Lactulose (Solution) lactulose 10 GM/15ML Take 10 g by mouth 2 times a day.        Levothyroxine Sodium (Tab) SYNTHROID 75 MCG Take 75 mcg by mouth Every morning on an empty stomach.        Liraglutide (Solution Pen-injector) VICTOZA 18 MG/3ML Inject 0.3 mL as instructed every day.        Melatonin (Tab) Melatonin 5 MG Take 10 mg by mouth every bedtime.        Nitroglycerin (SL Tab) NITROSTAT 0.4 MG Place 1 Tab under tongue as needed for Chest Pain.        non-formulary med non-formulary med  Inhale 2 L by mouth Continuous.        Nystatin (Powder) MYCOSTATIN  Apply 1 Application to affected area(s) 3 times a day.        Omeprazole (CAPSULE DELAYED RELEASE) PRILOSEC 20 MG Take 20 mg by mouth 2 times a day.        Oxycodone-Acetaminophen (Tab) PERCOCET-10  MG Take 2 Tabs by mouth every 6 hours.        Promethazine HCl (Tab) PHENERGAN 25 MG Take 1 Tab  by mouth every 6 hours as needed for Nausea/Vomiting.        RaNITidine HCl (Tab) ZANTAC 150 MG Take 150 mg by mouth 2 times a day.        Sodium Bicarbonate (Tab) sodium bicarbonate 325 MG Take 2 Tabs by mouth 3 times a day.        Ziprasidone HCl (Cap) GEODON 80 MG Take 160 mg by mouth every evening. DO NOT GIVE PAST 1700        .                 Medicines prescribed today were sent to:     [id number] - [place], [place] - [address] [address]    Phone: [phone] Fax: [phone]    Open 24 Hours?: No      Medication refill instructions:       If your prescription bottle indicates you have medication refills left, it is not necessary to call your provider’s office. Please contact your pharmacy and they will refill your medication.    If your prescription bottle indicates you do not have any refills left, you may request refills at any time through one of the following ways: The online [facility] system (except Urgent Care), by calling your provider’s office, or by asking your pharmacy to contact your provider’s office with a refill request. Medication refills are processed only during regular business hours and may not be available until the next business day. Your provider may request additional information or to have a follow-up visit with you prior to refilling your medication.   *Please Note: Medication refills are assigned a new Rx number when refilled electronically. Your pharmacy may indicate that no refills were authorized even though a new prescription for the same medication is available at the pharmacy. Please request the medicine by name with the pharmacy before contacting your provider for a refill.        Your To Do List     Future Labs/Procedures Complete By Expires    URINALYSIS  As directed 4/18/2018      Other Notes About Your Plan     DME:  [facility] / ph [phone] / fax [phone]              [email or web address] Access Code: Activation code not  generated  Current MyChart Status: Active

## 2017-04-18 NOTE — TELEPHONE ENCOUNTER
Pt's Grandmother is sending this message via [employer] e-mail. Please advise. Thank you. KA      When should I get appointment for Steroid Infusion?  I had to cancel the last two appoints because she went into the hospital just before scheduled appointments.  She went to hospital [id number], Feb 22-27, March 16-20, and Apr 12-16.  These episodes are getting more frequent.  Should we try three weeks between infusion treatments??  Is this going to cause more problems?

## 2017-04-19 ENCOUNTER — HOME CARE VISIT (OUTPATIENT)
Dept: HOME HEALTH SERVICES | Facility: HOME HEALTHCARE | Age: 27
End: 2017-04-19
Payer: MEDICARE

## 2017-04-19 VITALS
RESPIRATION RATE: 16 BRPM | TEMPERATURE: 97.7 F | SYSTOLIC BLOOD PRESSURE: 148 MMHG | DIASTOLIC BLOOD PRESSURE: 88 MMHG | HEART RATE: 90 BPM

## 2017-04-19 PROCEDURE — G0299 HHS/HOSPICE OF RN EA 15 MIN: HCPCS

## 2017-04-19 PROCEDURE — 665998 HH PPS REVENUE CREDIT

## 2017-04-19 PROCEDURE — 665999 HH PPS REVENUE DEBIT

## 2017-04-19 ASSESSMENT — ENCOUNTER SYMPTOMS
SEVERE DYSPNEA: 1
POOR JUDGMENT: 1
DEBILITATING PAIN: 1

## 2017-04-19 NOTE — TELEPHONE ENCOUNTER
Has she received any outpatient steroid infusions? JS    No, she keeps ending up back in the hospital. I do not know if they are giving her IV steroids as a in patient? Please advise. Thank you. KA

## 2017-04-20 ENCOUNTER — HOME CARE VISIT (OUTPATIENT)
Dept: HOME HEALTH SERVICES | Facility: HOME HEALTHCARE | Age: 27
End: 2017-04-20
Payer: MEDICARE

## 2017-04-20 VITALS
HEART RATE: 89 BPM | BODY MASS INDEX: 45.91 KG/M2 | DIASTOLIC BLOOD PRESSURE: 100 MMHG | SYSTOLIC BLOOD PRESSURE: 138 MMHG | RESPIRATION RATE: 16 BRPM | TEMPERATURE: 96.8 F | WEIGHT: 259.13 LBS

## 2017-04-20 PROCEDURE — 665999 HH PPS REVENUE DEBIT

## 2017-04-20 PROCEDURE — G0151 HHCP-SERV OF PT,EA 15 MIN: HCPCS

## 2017-04-20 PROCEDURE — 665998 HH PPS REVENUE CREDIT

## 2017-04-20 ASSESSMENT — BALANCE ASSESSMENTS
EYES CLOSED AT MAXIMUM POSITION NUDGED: 1
SITTING DOWN: 2
BALANCE SCORE: 15
IMMEDIATE STANDING BALANCE FIRST 5 SECONDS: 2
TURNING 360 DEGREES STEPS: 0
TURNING 360 DEGREES STEADINESS: 1
SITTING BALANCE: 1
ATTEMPTS TO ARISE: 2
NUDGED: 2
STANDING BALANCE: 2
SURVEY COMPLETE: TRUE
ARISES: 2

## 2017-04-20 ASSESSMENT — GAIT ASSESSMENTS
INITIATION OF GAIT IMMEDIATELY AFTER GO: 1
LEFT STEP CLEAR: 1
TIME: 0
STEP CONTINUITY: 1
TRUNK: 1
RIGHT STEP CLEAR: 1
LEFT STEP PASS: 1
GAIT SCORE: 9
BALANCE AND GAIT SCORE: 24
RIGHT STEP PASS: 1
PATH: 1
SURVEY COMPLETE: TRUE
STEP SYMMETRY: 1

## 2017-04-21 PROCEDURE — 665998 HH PPS REVENUE CREDIT

## 2017-04-21 PROCEDURE — 665999 HH PPS REVENUE DEBIT

## 2017-04-22 PROCEDURE — 665999 HH PPS REVENUE DEBIT

## 2017-04-22 PROCEDURE — 665998 HH PPS REVENUE CREDIT

## 2017-04-23 PROCEDURE — 665999 HH PPS REVENUE DEBIT

## 2017-04-23 PROCEDURE — 665998 HH PPS REVENUE CREDIT

## 2017-04-24 ENCOUNTER — TELEPHONE (OUTPATIENT)
Dept: MEDICAL GROUP | Facility: MEDICAL CENTER | Age: 27
End: 2017-04-24

## 2017-04-24 ENCOUNTER — OFFICE VISIT (OUTPATIENT)
Dept: URGENT CARE | Facility: CLINIC | Age: 27
End: 2017-04-24
Payer: MEDICARE

## 2017-04-24 ENCOUNTER — APPOINTMENT (OUTPATIENT)
Dept: RADIOLOGY | Facility: IMAGING CENTER | Age: 27
End: 2017-04-24
Attending: PHYSICIAN ASSISTANT
Payer: MEDICARE

## 2017-04-24 ENCOUNTER — HOME CARE VISIT (OUTPATIENT)
Dept: HOME HEALTH SERVICES | Facility: HOME HEALTHCARE | Age: 27
End: 2017-04-24
Payer: MEDICARE

## 2017-04-24 VITALS
OXYGEN SATURATION: 95 % | WEIGHT: 244 LBS | TEMPERATURE: 98.1 F | SYSTOLIC BLOOD PRESSURE: 112 MMHG | DIASTOLIC BLOOD PRESSURE: 80 MMHG | HEART RATE: 86 BPM | HEIGHT: 63 IN | BODY MASS INDEX: 43.23 KG/M2 | RESPIRATION RATE: 18 BRPM

## 2017-04-24 VITALS
SYSTOLIC BLOOD PRESSURE: 138 MMHG | RESPIRATION RATE: 16 BRPM | TEMPERATURE: 96.5 F | HEART RATE: 74 BPM | DIASTOLIC BLOOD PRESSURE: 84 MMHG

## 2017-04-24 DIAGNOSIS — S69.92XA LEFT WRIST INJURY, INITIAL ENCOUNTER: ICD-10-CM

## 2017-04-24 PROCEDURE — 99213 OFFICE O/P EST LOW 20 MIN: CPT | Performed by: PHYSICIAN ASSISTANT

## 2017-04-24 PROCEDURE — G0162 HHC RN E&M PLAN SVS, 15 MIN: HCPCS

## 2017-04-24 PROCEDURE — G8432 DEP SCR NOT DOC, RNG: HCPCS | Performed by: PHYSICIAN ASSISTANT

## 2017-04-24 PROCEDURE — 1111F DSCHRG MED/CURRENT MED MERGE: CPT | Performed by: PHYSICIAN ASSISTANT

## 2017-04-24 PROCEDURE — 665999 HH PPS REVENUE DEBIT

## 2017-04-24 PROCEDURE — 73110 X-RAY EXAM OF WRIST: CPT | Mod: TC,LT | Performed by: PHYSICIAN ASSISTANT

## 2017-04-24 PROCEDURE — G8419 CALC BMI OUT NRM PARAM NOF/U: HCPCS | Performed by: PHYSICIAN ASSISTANT

## 2017-04-24 PROCEDURE — 1036F TOBACCO NON-USER: CPT | Performed by: PHYSICIAN ASSISTANT

## 2017-04-24 PROCEDURE — 665998 HH PPS REVENUE CREDIT

## 2017-04-24 ASSESSMENT — ENCOUNTER SYMPTOMS
WHEEZING: 0
SHORTNESS OF BREATH: 0
DIARRHEA: 0
PALPITATIONS: 0
VOMITING: 0
DIZZINESS: 1
NAUSEA: 0
DIFFICULTY THINKING: 1
DEBILITATING PAIN: 1
COUGH: 0
WEAKNESS: 1
SENSORY CHANGE: 1
FEVER: 0
FALLS: 1
CHILLS: 0
ABDOMINAL PAIN: 0

## 2017-04-24 NOTE — TELEPHONE ENCOUNTER
1. Caller Name: Chi                                         Call Back Number: [phone]      Patient approves a detailed voicemail message: N\A    Called he is a Home Health Guardian calling to let you know that your patient fall and injured rib and shoulder, her sugars were low, she is been taken to urgent care to get diagnosed and if there is any questions he left his number to call him back.

## 2017-04-24 NOTE — TELEPHONE ENCOUNTER
1. Caller Name: Kristin                                         Call Back Number:       Patient approves a detailed voicemail message: N\A    2. What are the patient's symptoms (location & severity)? Blood Sugar over 300    3. Is this a new symptom Yes    4. When did it start? Today    5. Action taken per Active Symptom Guide: ER or UC recommended    6. Patient agrees to recommended action per active symptom guide

## 2017-04-24 NOTE — TELEPHONE ENCOUNTER
Sandoval Fox M.D.  Nella Godinez, Med Ass't       Caller: Unspecified (6 days ago, 7:34 AM)                     I am not sure what to do right now, usually outpatient IV steroid infusions can be easily set up, hopefully they will be able to provide IV steroids as an inpatient. Dr. Beard is on-call this week.

## 2017-04-24 NOTE — MR AVS SNAPSHOT
"        Kristin Balderrama   2017 10:30 AM   Office Visit   MRN: [id number]    Department:  [facility]   Dept Phone:  [phone]    Description:  Female : 1990   Provider:  Bright Slade PA-C           Reason for Visit     Arm Injury x day/ tripped and hurt her left arm/ shoulder, hand, and forearm/swollen       Allergies as of 2017     Allergen Noted Reactions    Cefdinir 2016   Shortness of Breath, Itching    Tolerated 17    Depakote [Divalproex Sodium] 2010   Unspecified    Muscle spasms/muscle pain and weakness      Abilify 2013   Unspecified    Headaches/muscle twitching      Amitriptyline 10/31/2013   Unspecified    Headaches      Amoxicillin 2010   Rash    Pt states \"I get a rash\".      Ciprofloxacin 2009   Rash    Pt states \"I get a rash\".      Clindamycin 2011   Nausea    Even with food      Doxycycline 08/15/2012   Vomiting, Nausea    RXN=unknown    Ees [Erythromycin] 2010   Vomiting, Nausea    Flagyl [Metronidazole Hcl] 2011   Unspecified    \"eye problems\"      Flomax [Tamsulosin Hydrochloride] 2009   Swelling    Metformin 2013   Unspecified    Increased lactic acid       Sulfa Drugs 2010   Hives, Rash    RXN=since childhood    Tape 08/15/2012   Rash    Tears skin off   coban with Tegaderm tape ok  RXN=ongoing    Vancomycin 07/10/2016   Itching    Pt becomes flushed in face and chest.   RXN=7/10/16    Cephalexin [Keflex] 2017   Rash    Pt states she gets a rash with this medication    Erythromycin 2017       Levofloxacin 10/27/2016   Unspecified    Leg muscle cramps    Metronidazole 2017       Valproic Acid 2017         You were diagnosed with     Left wrist injury, initial encounter   [102934]         Vital Signs     Blood Pressure Pulse Temperature Respirations Height Weight    112/80 mmHg 86 36.7 °C (98.1 °F) 18 1.6 m (5' 2.99\") 110.678 kg (244 lb)    Body Mass Index Oxygen " Saturation Last Menstrual Period Breastfeeding? Smoking Status       43.23 kg/m2 95% 06/13/2016 No Passive Smoke Exposure - Never Smoker       Basic Information     Date Of Birth Sex Race Ethnicity Preferred Language    1990 Female White Non- English      Your appointments     Apr 26, 2017 10:20 AM   Established Patient with AXEL Casas   [facility] [address]    [address]  [address]  [place] [address] [phone]           You will be receiving a confirmation call a few days before your appointment from our automated call confirmation system.            Apr 26, 2017 To Be Determined   SN-HH-OASIS RECERTIFICATION with Shelbi Ruby R.N.   [facility] (--)    [address] [place] [address].  [place] [address]   [phone]            May 01, 2017  9:30 AM   Follow Up Med Management with Ronny Burnette M.D.   [facility] [address] [place] [address])    [address]  [address]  [place] [address]   [phone]            May 01, 2017 11:20 AM   FOLLOW UP with Torres Kumar M.D.   [facility] (--)    [address], [address]  [place] [address] [phone]            May 09, 2017 11:00 AM   EST MISC Infusion 2 HR with RN 5   [facility]    [address]  [place] [address]   [phone]            May 16, 2017  1:20 PM   New Patient with Chencho Norman M.D.   [facility] (--)    [address]  [place] [address] [phone]           Please bring enclosed paperwork completed along with your insurance card and photo ID.            May 31, 2017  9:00 AM   Individual Therapy with Blanca Boston, L.C.S.W.   [facility]    [address]  [address]  [place] [address]   [phone]            Jul 17, 2017 10:00 AM   Individual Therapy with Blanca Boston, L.C.S.W.   [facility]    [address]  [address]  [facility]  [address]   [phone]            Jul 18, 2017  9:40 AM   Established Patient with Torres Brody M.D.   [facility] [address])    [address]  [address]  [place] [address]   [phone]           You will be receiving a confirmation call a few days before your appointment from our automated call confirmation system.            Jul 19, 2017  8:40 AM   FOLLOW UP with Torres Kumar M.D.   [facility] (--)    [address]  [place] [address] [phone]            Aug 11, 2017 10:00 AM   Follow Up Visit with Sandoval Fox M.D.   [facility] (--)    [address], [address]  [place] [address] [phone]           You will be receiving a confirmation call a few days before your appointment from our automated call confirmation system.              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 Low  9/18/2010 - Present    Neurogenic bladder N31.9 Low  4/2/2011 - Present    Sinus tachycardia (Chronic) R00.0 High  10/31/2013 - Present    Knee pain, right M25.561 Low  2/13/2014 - Present    Anxiety F41.9 Low  12/16/2014 - Present    Fatty liver disease, nonalcoholic K76.0 Low  1/19/2015 - Present    Progressive focal motor weakness M62.81 Low  6/28/2015 - Present    Acquired hypothyroidism E03.9 Low  11/23/2015 - Present    PCOS (polycystic ovarian syndrome) E28.2 Low  11/23/2015 - Present    H/O prior ablation treatment Z98.890   2/10/2016 - Present    Peripheral neuropathy (CMS-HCC) (Chronic) G62.9   3/6/2016 - Present    TONYA on CPAP G47.33 Low  3/7/2016 - Present    Morbidly obese (CMS-HCC) E66.01   3/7/2016 - Present    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   3/7/2016 - Present    Scoliosis M41.9   3/7/2016 - Present    GERD (gastroesophageal reflux disease) (Chronic) K21.9   3/7/2016 - Present     Vitamin D deficiency E55.9   5/21/2016 - Present    Chronic inflammatory arthritis (Chronic) M19.90 Medium  5/23/2016 - Present    Right flank pain R10.9   6/6/2016 - Present    Weakness of both lower extremities M62.81   6/22/2016 - Present    Elevated sedimentation rate R70.0   6/27/2016 - Present    Galactorrhea O92.6   7/22/2016 - Present    Psychosis, schizophrenia, simple (HCC) (Chronic) F20.89 Low Chronic 9/29/2016 - Present    Schizophrenia (CMS-HCC) F20.9 Low  10/27/2016 - Present    Paralysis (CMS-[facility]) G83.9 High  10/27/2016 - Present    Chronic pain syndrome (Chronic) G89.4   10/27/2016 - Present    Bowel and bladder incontinence R32, R15.9   10/27/2016 - Present    Depression F32.9 Low  10/28/2016 - Present    HTN (hypertension) (Chronic) I10   11/1/2016 - Present    Hypovitaminosis D E55.9   11/29/2016 - Present    Leg weakness M62.81   1/4/2017 - Present    Weakness R53.1   1/22/2017 - Present    Paraparesis of both lower limbs (CMS-[facility]) G82.20 High  1/24/2017 - Present    Chronic suprapubic catheter (CMS-[facility]) (Chronic) Z93.59   2/16/2017 - Present    Leg weakness, bilateral M62.81   2/22/2017 - Present    Weakness of right upper extremity M62.81   2/23/2017 - Present    Chest pain R07.9   3/30/2017 - Present    Diabetes mellitus type 2 in obese (CMS-HCC) E11.9, E66.9 Medium  4/13/2017 - Present    On home oxygen therapy (Chronic) Z99.81   4/15/2017 - Present      Health Maintenance        Date Due Completion Dates    DIABETES MONOFILAMENT / LE EXAM 4/13/1990 ---    IMM HEP A VACCINE (1 of 2 - Standard Series) 10/13/1990 ---    IMM VARICELLA (CHICKENPOX) VACCINE (1 of 2 - 2 Dose Adolescent Series) 10/13/2002 ---    RETINAL SCREENING 10/13/2007 ---    URINE ACR / MICROALBUMIN 10/13/2007 ---    A1C SCREENING 10/6/2017 4/6/2017, 12/7/2016, 10/14/2016, 3/9/2016, 9/5/2014    FASTING LIPID PROFILE 3/7/2018 3/7/2017, 2/24/2017, 12/7/2016, 10/28/2016, 10/14/2016, 3/21/2014    SERUM CREATININE 4/14/2018  "4/14/2017, 4/13/2017, 4/12/2017, 4/5/2017, 3/27/2017, 3/18/2017, 3/17/2017, 3/16/2017, 3/11/2017, 3/10/2017, 3/9/2017, 3/7/2017, 2/25/2017, 2/24/2017, 2/23/2017, 2/22/2017, 1/30/2017, 1/27/2017, 1/25/2017, 1/22/2017, 1/22/2017, 1/18/2017, 1/18/2017, 12/7/2016, 12/6/2016, 11/4/2016, 11/3/2016, 11/2/2016, 11/1/2016, 10/28/2016, 10/27/2016, 10/14/2016, 10/4/2016, 10/3/2016, 9/29/2016, 8/16/2016, 7/28/2016, 7/28/2016, 7/10/2016, 6/25/2016, 6/23/2016, 6/22/2016, 6/7/2016, 5/18/2016, 4/19/2016, 4/3/2016, 3/30/2016, 3/29/2016, 3/28/2016, 3/14/2016, 3/10/2016, 3/9/2016, 3/7/2016, 3/6/2016, 2/15/2016, 2/12/2016, 1/29/2016, 1/28/2016, 1/26/2016, 11/28/2015, 11/27/2015, 11/23/2015, 11/22/2015, 7/9/2015, 7/8/2015, 6/27/2015, 4/14/2015, 3/23/2015, 2/18/2015, 10/27/2014, 9/5/2014, 3/21/2014, 12/24/2013, 1/12/2013, 8/24/2012, 8/23/2012, 8/22/2012, 8/15/2012, 4/21/2012, 4/20/2012, 4/19/2012, 9/17/2011, 4/3/2011, 4/2/2011, 3/31/2011, 9/20/2010, 9/19/2010, 9/18/2010, 8/28/2010, 7/20/2010, 1/30/2007    PAP SMEAR 7/22/2019 7/22/2016 (Postponed), 2/19/2013 (N/S)    Override on 7/22/2016: Postponed (per pt was told could \"skip\" 2016)    Override on 2/19/2013: (N/S) ([place])    IMM DTaP/Tdap/Td Vaccine (7 - Td) 8/6/2022 8/6/2012, 9/18/2010, 3/3/1994, 5/17/1991, 5/10/1990, 3/23/1990, 1990    COLONOSCOPY 9/25/2023 9/25/2013            Current Immunizations     Dtap Vaccine 3/3/1994, 5/17/1991, 5/10/1990, 3/23/1990, 1990    HIB Vaccine(PEDVAX) 1/11/1991    HPV Quadrivalent Vaccine (GARDASIL) 10/27/2011, 5/27/2011, 3/1/2011    Hepatitis B Vaccine Non-Recombivax (Ped/Adol) 1/28/2004, 10/28/2003, 10/29/1999    Influenza TIV (IM) 9/5/2013, 12/7/2011, 11/7/2011    Influenza Vaccine Adult HD 10/5/2016    MMR Vaccine 3/3/1994, 1/11/1991    OPV - Historical Data 3/3/1994, 5/17/1991, 5/10/1990, 3/23/1990, 1990    Pneumococcal Vaccine (PCV7) Historical Data 11/8/2015    Pneumococcal polysaccharide vaccine (PPSV-23) 1/23/2017  5:35 PM  "    TD Vaccine 9/18/2010  4:45 PM    Tdap Vaccine 8/6/2012      Below and/or attached are the medications your provider expects you to take. Review all of your home medications and newly ordered medications with your provider and/or pharmacist. Follow medication instructions as directed by your provider and/or pharmacist. Please keep your medication list with you and share with your provider. Update the information when medications are discontinued, doses are changed, or new medications (including over-the-counter products) are added; and carry medication information at all times in the event of emergency situations     Allergies:  CEFDINIR - Shortness of Breath,Itching     DEPAKOTE - Unspecified     ABILIFY - Unspecified     AMITRIPTYLINE - Unspecified     AMOXICILLIN - Rash     CIPROFLOXACIN - Rash     CLINDAMYCIN - Nausea     DOXYCYCLINE - Vomiting,Nausea     EES - Vomiting,Nausea     FLAGYL - Unspecified     FLOMAX - Swelling     METFORMIN - Unspecified     SULFA DRUGS - Hives,Rash     TAPE - Rash     VANCOMYCIN - Itching     CEPHALEXIN - Rash     ERYTHROMYCIN - (reactions not documented)     LEVOFLOXACIN - Unspecified     METRONIDAZOLE - (reactions not documented)     VALPROIC ACID - (reactions not documented)               Medications  Valid as of: April 24, 2017 - 11:26 AM    Generic Name Brand Name Tablet Size Instructions for use    Albuterol Sulfate (Aero Soln) albuterol 108 (90 BASE) MCG/ACT Inhale 2 Puffs by mouth every 6 hours as needed for Shortness of Breath.        Aspirin (Tablet Delayed Response) ECOTRIN 81 MG Take 1 Tab by mouth every day.        Baclofen (Tab) LIORESAL 10 MG Take 1 Tab by mouth 3 times a day.        Blood Glucose Monitoring Suppl (Misc) Blood Glucose Monitoring Suppl SUPPLIES Test strips order: Test strips for accucheck meter. Sig: use qday        Cranberry (Cap) Cranberry 1000 MG Take 1 Cap by mouth 2 times a day, with meals.        Cranberry   Take 50,400 mg by mouth 2 times a  day.        Cyanocobalamin (Tab) vitamin b12 500 MCG Take 1,000 mcg by mouth 2 times a day.        Ergocalciferol (Cap) DRISDOL 93939 UNITS Take 50,000 Units by mouth every Friday.        FentaNYL (PATCH 72 HR) DURAGESIC 25 MCG/HR Apply 1 Patch to skin as directed every 72 hours.        FLUoxetine HCl (Cap) PROZAC 10 MG TAKE ONE CAPSULE BY MOUTH ONCE A DAY        Furosemide (Tab) LASIX 20 MG Take 20 mg by mouth every day.        Gabapentin (Once-Daily) (Tab) Gabapentin (Once-Daily) 600 MG Take 600 mg by mouth 3 times a day.        Ivabradine HCl (Tab) CORLANOR 5 MG Take 1 Tab by mouth 2 times a day, with meals.        Lactulose (Solution) lactulose 10 GM/15ML Take 10 g by mouth 2 times a day.        Levothyroxine Sodium (Tab) SYNTHROID 75 MCG Take 75 mcg by mouth Every morning on an empty stomach.        Liraglutide (Solution Pen-injector) VICTOZA 18 MG/3ML Inject 0.3 mL as instructed every day.        Melatonin (Tab) Melatonin 5 MG Take 10 mg by mouth every bedtime.        Nitroglycerin (SL Tab) NITROSTAT 0.4 MG Place 1 Tab under tongue as needed for Chest Pain.        non-formulary med non-formulary med  Inhale 2 L by mouth Continuous.        Nystatin (Powder) MYCOSTATIN  Apply 1 Application to affected area(s) 3 times a day.        Omeprazole (CAPSULE DELAYED RELEASE) PRILOSEC 20 MG Take 20 mg by mouth 2 times a day.        Oxycodone-Acetaminophen (Tab) PERCOCET-10  MG Take 2 Tabs by mouth every 6 hours.        Promethazine HCl (Tab) PHENERGAN 25 MG Take 1 Tab by mouth every 6 hours as needed for Nausea/Vomiting.        RaNITidine HCl (Tab) ZANTAC 150 MG Take 150 mg by mouth 2 times a day.        Sodium Bicarbonate (Tab) sodium bicarbonate 325 MG Take 2 Tabs by mouth 3 times a day.        Ziprasidone HCl (Cap) GEODON 80 MG Take 160 mg by mouth every evening. DO NOT GIVE PAST 1700        .                 Medicines prescribed today were sent to:     [id number] - [place], [place] - [address]     [address] [place] [address]    Phone: [phone] Fax: [phone]    Open 24 Hours?: No      Medication refill instructions:       If your prescription bottle indicates you have medication refills left, it is not necessary to call your provider’s office. Please contact your pharmacy and they will refill your medication.    If your prescription bottle indicates you do not have any refills left, you may request refills at any time through one of the following ways: The online [employer] system (except Urgent Care), by calling your provider’s office, or by asking your pharmacy to contact your provider’s office with a refill request. Medication refills are processed only during regular business hours and may not be available until the next business day. Your provider may request additional information or to have a follow-up visit with you prior to refilling your medication.   *Please Note: Medication refills are assigned a new Rx number when refilled electronically. Your pharmacy may indicate that no refills were authorized even though a new prescription for the same medication is available at the pharmacy. Please request the medicine by name with the pharmacy before contacting your provider for a refill.        Your To Do List     Future Labs/Procedures Complete By Expires    DX-WRIST-COMPLETE 3+ LEFT  As directed 4/24/2018      Other Notes About Your Plan     DME:  [facility] / ph [phone] / fax [phone]              [employer] Access Code: Activation code not generated  Current [employer] Status: Active

## 2017-04-24 NOTE — PROGRESS NOTES
Subjective:      Kristin Balderrama is a 26 y.o. female who presents with Arm Injury            Arm Injury  This is a new problem. The current episode started yesterday. The problem occurs constantly. The problem has been unchanged. Associated symptoms include weakness. Pertinent negatives include no abdominal pain, chest pain, chills, coughing, fever, nausea or vomiting. Exacerbated by: Movement. She has tried oral narcotics, ice and rest (Fentanyl, Baclofen, Percocet) for the symptoms. The treatment provided mild relief.       PMH:  has a past medical history of Scoliosis; Multiple personality disorder; Chronic UTI (9/18/2010); Heart burn; Pain (08-15-12); History of falling; Sinus tachycardia (10/31/2013); Urinary incontinence; Hypertension; Disorder of thyroid; Obesity; Pneumonia (2012); ASTHMA; Breath shortness; Anginal syndrome; Psychosis; Arthritis; PCOS (polycystic ovarian syndrome); Gynecological disorder; Renal disorder; Arrhythmia; Urinary bladder disorder; Tuberculosis; Sleep apnea; 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CMS-HCC); and Fall.  MEDS:   Current outpatient prescriptions:   •  aspirin EC (ECOTRIN) 81 MG Tablet Delayed Response, Take 1 Tab by mouth every day., Disp: 30 Tab, Rfl: 6  •  Blood Glucose Monitoring Suppl SUPPLIES Misc, Test strips order: Test strips for accucheck meter. Sig: use qday, Disp: 50 Each, Rfl: 11  •  liraglutide (VICTOZA) 18 MG/3ML Solution Pen-injector injection, Inject 0.3 mL as instructed every day., Disp: 3 PEN, Rfl: 11  •  baclofen (LIORESAL) 10 MG Tab, Take 1 Tab by mouth 3 times a day., Disp: 90 Tab, Rfl: 6  •  Gabapentin, Once-Daily, (GRALISE) 600 MG Tab, Take 600 mg by mouth 3 times a day., Disp: , Rfl:   •  furosemide (LASIX) 20 MG Tab, Take 20 mg by mouth every day., Disp: , Rfl:   •  ranitidine (ZANTAC) 150 MG Tab, Take 150 mg by mouth 2 times a day., Disp: , Rfl:   •  oxycodone-acetaminophen (PERCOCET-10)  MG Tab, Take 2 Tabs by mouth every 6 hours., Disp: , Rfl:   •   Melatonin 5 MG Tab, Take 10 mg by mouth every bedtime., Disp: , Rfl:   •  ziprasidone (GEODON) 80 MG Cap, Take 160 mg by mouth every evening. DO NOT GIVE PAST 1700, Disp: , Rfl:   •  fentanyl (DURAGESIC) 25 MCG/HR PATCH 72 HR, Apply 1 Patch to skin as directed every 72 hours., Disp: , Rfl:   •  lactulose 10 GM/15ML Solution, Take 10 g by mouth 2 times a day., Disp: , Rfl:   •  vitamin D, Ergocalciferol, (DRISDOL) 98500 UNITS Cap capsule, Take 50,000 Units by mouth every Friday., Disp: , Rfl:   •  albuterol 108 (90 BASE) MCG/ACT Aero Soln inhalation aerosol, Inhale 2 Puffs by mouth every 6 hours as needed for Shortness of Breath., Disp: , Rfl:   •  cyanocobalamin (HM VITAMIN B12) 500 MCG tablet, Take 1,000 mcg by mouth 2 times a day., Disp: , Rfl:   •  sodium bicarbonate 325 MG Tab, Take 2 Tabs by mouth 3 times a day., Disp: , Rfl:   •  levothyroxine (SYNTHROID) 75 MCG Tab, Take 75 mcg by mouth Every morning on an empty stomach., Disp: , Rfl:   •  omeprazole (PRILOSEC) 20 MG delayed-release capsule, Take 20 mg by mouth 2 times a day., Disp: , Rfl:   •  CRANBERRY PO, Take 50,400 mg by mouth 2 times a day., Disp: , Rfl:   •  non-formulary med, Inhale 2 L by mouth Continuous., Disp: , Rfl:   •  ivabradine (CORLANOR) 5 MG Tab tablet, Take 1 Tab by mouth 2 times a day, with meals., Disp: 60 Tab, Rfl: 6  •  fluoxetine (PROZAC) 10 MG Cap, TAKE ONE CAPSULE BY MOUTH ONCE A DAY, Disp: 30 Cap, Rfl: 2  •  nitroglycerin (NITROSTAT) 0.4 MG SL Tab, Place 1 Tab under tongue as needed for Chest Pain., Disp: 25 Tab, Rfl: 1  •  nystatin (MYCOSTATIN) Powder, Apply 1 Application to affected area(s) 3 times a day., Disp: 1 Bottle, Rfl: 2  •  Cranberry 1000 MG Cap, Take 1 Cap by mouth 2 times a day, with meals. (Patient not taking: Reported on 4/17/2017), Disp: 60 Cap, Rfl: 3  •  promethazine (PHENERGAN) 25 MG Tab, Take 1 Tab by mouth every 6 hours as needed for Nausea/Vomiting., Disp: 30 Tab, Rfl: 0  ALLERGIES:   Allergies   Allergen  "Reactions   • Cefdinir Shortness of Breath and Itching     Tolerated 1/18/17   • Depakote [Divalproex Sodium] Unspecified     Muscle spasms/muscle pain and weakness     • Abilify Unspecified     Headaches/muscle twitching     • Amitriptyline Unspecified     Headaches     • Amoxicillin Rash     Pt states \"I get a rash\".     • Ciprofloxacin Rash     Pt states \"I get a rash\".     • Clindamycin Nausea     Even with food     • Doxycycline Vomiting and Nausea     RXN=unknown   • Ees [Erythromycin] Vomiting and Nausea   • Flagyl [Metronidazole Hcl] Unspecified     \"eye problems\"     • Flomax [Tamsulosin Hydrochloride] Swelling   • Metformin Unspecified     Increased lactic acid      • Sulfa Drugs Hives and Rash     RXN=since childhood   • Tape Rash     Tears skin off   coban with Tegaderm tape ok  RXN=ongoing   • Vancomycin Itching     Pt becomes flushed in face and chest.   RXN=7/10/16   • Cephalexin [Keflex] Rash     Pt states she gets a rash with this medication   • Erythromycin    • Levofloxacin Unspecified     Leg muscle cramps   • Metronidazole    • Valproic Acid      SURGHX:   Past Surgical History   Procedure Laterality Date   • Neuro dest facet l/s w/ig sngl  4/21/2015     Performed by Reza Tabor at SURGERY SURGICAL [facility] ORS   • Recovery  1/27/2016     Procedure: CATH LAB EP STUDY WITH SINUS NODE MODIFICATION ABHINAV;  Surgeon: [facility];  Location: SURGERY PRE-POST PROC UNIT [facility];  Service:    • Katie by laparoscopy  8/29/2010     Performed by SHAYY JOHNS at SURGERY [facility]   • Lumbar fusion anterior  8/21/2012     Performed by SUSIE SAWANT at SURGERY [facility] ORS   • Other cardiac surgery  1/2016     cardiac ablation   • Tonsillectomy       tonsillectomy   • Bowel stimulator insertion  7/13/2016     Procedure: BOWEL STIMULATOR INSERTION FOR PERMANENT INTERSTIM SACRAL IMPLANT;  Surgeon: Joe Noyola M.D.;  Location: SURGERY [facility];  Service:    • Gastroscopy with balloon " "dilatation N/A 1/4/2017     Procedure: GASTROSCOPY WITH DILATATION;  Surgeon: Torres Vargas M.D.;  Location: SURGERY [facility] ORS;  Service:    • Muscle biopsy Right 1/26/2017     Procedure: MUSCLE BIOPSY - THIGH;  Surgeon: Isidro Vigil M.D.;  Location: SURGERY [facility] ORS;  Service:    • Other abdominal surgery       SOCHX:  reports that she has been passively smoking Cigarettes.  She has never used smokeless tobacco. She reports that she does not drink alcohol or use illicit drugs.  FH: family history includes Genitourinary () in her sister; Heart Disease in her maternal grandmother and mother; Hypertension in her maternal grandmother, maternal uncle, and mother; Other in her mother and sister; Sleep Apnea in her mother.      Review of Systems   Constitutional: Negative for fever and chills.   Respiratory: Negative for cough, shortness of breath and wheezing.    Cardiovascular: Negative for chest pain, palpitations and leg swelling.   Gastrointestinal: Negative for nausea, vomiting, abdominal pain and diarrhea.   Musculoskeletal: Positive for joint pain (Left wrist, Left shoulder) and falls.   Neurological: Positive for dizziness, sensory change and weakness.       Medications, Allergies, and current problem list reviewed today in Epic     Objective:     /80 mmHg  Pulse 86  Temp(Src) 36.7 °C (98.1 °F)  Resp 18  Ht 1.6 m (5' 2.99\")  Wt 110.678 kg (244 lb)  BMI 43.23 kg/m2  SpO2 95%  LMP 06/13/2016  Breastfeeding? No     Physical Exam   Constitutional: She is oriented to person, place, and time. She appears well-developed and well-nourished. No distress.   HENT:   Head: Normocephalic and atraumatic.   Right Ear: External ear normal.   Left Ear: External ear normal.   Eyes: Conjunctivae and EOM are normal. Left eye exhibits no discharge.   Neck: Normal range of motion. Neck supple.   Cardiovascular: Normal rate, regular rhythm and normal heart sounds.    Pulmonary/Chest: Effort normal and " breath sounds normal. No respiratory distress. She has no wheezes.   Abdominal: Soft. She exhibits no distension. There is no tenderness.   Musculoskeletal: She exhibits edema and tenderness.        Left wrist: She exhibits tenderness, bony tenderness and swelling. She exhibits normal range of motion and no deformity.        Arms:  Neurological: She is alert and oriented to person, place, and time.   Skin: Skin is warm and dry. She is not diaphoretic.   Psychiatric: She has a normal mood and affect. Her behavior is normal. Judgment and thought content normal.   Nursing note and vitals reviewed.         Xray: no fracture or dislocation by my read. Radiology review pending.       Assessment/Plan:     1. Left wrist injury, initial encounter  DX-WRIST-COMPLETE 3+ LEFT     X-ray negative left wrist contusion. Vital signs normal. Patient was placed in an Ace wrap and given a shoulder sling  Rice therapy  Continue chronic pain medication as directed  Return to clinic or go to ED if symptoms worsen or persist. Indications for ED discussed at length. Patient voices understanding. Follow-up with your primary care provider in 3-5 days. Red flags discussed.    Please note that this dictation was created using voice recognition software. I have made every reasonable attempt to correct obvious errors, but I expect that there are errors of grammar and possibly content that I did not discover before finalizing the note.

## 2017-04-25 ENCOUNTER — HOME CARE VISIT (OUTPATIENT)
Dept: HOME HEALTH SERVICES | Facility: HOME HEALTHCARE | Age: 27
End: 2017-04-25
Payer: MEDICARE

## 2017-04-25 PROCEDURE — 665998 HH PPS REVENUE CREDIT

## 2017-04-25 PROCEDURE — 665999 HH PPS REVENUE DEBIT

## 2017-04-26 ENCOUNTER — OFFICE VISIT (OUTPATIENT)
Dept: MEDICAL GROUP | Facility: MEDICAL CENTER | Age: 27
End: 2017-04-26
Payer: MEDICARE

## 2017-04-26 VITALS
OXYGEN SATURATION: 97 % | HEART RATE: 80 BPM | TEMPERATURE: 98.8 F | BODY MASS INDEX: 45.18 KG/M2 | HEIGHT: 63 IN | RESPIRATION RATE: 16 BRPM | WEIGHT: 255 LBS | DIASTOLIC BLOOD PRESSURE: 64 MMHG | SYSTOLIC BLOOD PRESSURE: 128 MMHG

## 2017-04-26 PROBLEM — E11.69 DIABETES MELLITUS TYPE 2 IN OBESE: Status: RESOLVED | Noted: 2017-04-13 | Resolved: 2017-04-26

## 2017-04-26 PROBLEM — E66.9 DIABETES MELLITUS TYPE 2 IN OBESE: Status: RESOLVED | Noted: 2017-04-13 | Resolved: 2017-04-26

## 2017-04-26 PROCEDURE — 1036F TOBACCO NON-USER: CPT | Performed by: NURSE PRACTITIONER

## 2017-04-26 PROCEDURE — 1111F DSCHRG MED/CURRENT MED MERGE: CPT | Performed by: NURSE PRACTITIONER

## 2017-04-26 PROCEDURE — 3045F PR MOST RECENT HEMOGLOBIN A1C LEVEL 7.0-9.0%: CPT | Performed by: NURSE PRACTITIONER

## 2017-04-26 PROCEDURE — G8432 DEP SCR NOT DOC, RNG: HCPCS | Performed by: NURSE PRACTITIONER

## 2017-04-26 PROCEDURE — G8419 CALC BMI OUT NRM PARAM NOF/U: HCPCS | Performed by: NURSE PRACTITIONER

## 2017-04-26 PROCEDURE — 665999 HH PPS REVENUE DEBIT

## 2017-04-26 PROCEDURE — 99213 OFFICE O/P EST LOW 20 MIN: CPT | Performed by: NURSE PRACTITIONER

## 2017-04-26 PROCEDURE — 665998 HH PPS REVENUE CREDIT

## 2017-04-26 NOTE — MR AVS SNAPSHOT
"        Kristin Balderrama   2017 10:20 AM   Office Visit   MRN: [id number]    Department:  [address] [place] [address]   Dept Phone:  [phone]    Description:  Female : 1990   Provider:  AXEL Casas           Reason for Visit     Fall     Dysuria           Allergies as of 2017     Allergen Noted Reactions    Cefdinir 2016   Shortness of Breath, Itching    Tolerated 17    Depakote [Divalproex Sodium] 2010   Unspecified    Muscle spasms/muscle pain and weakness      Abilify 2013   Unspecified    Headaches/muscle twitching      Amitriptyline 10/31/2013   Unspecified    Headaches      Amoxicillin 2010   Rash    Pt states \"I get a rash\".      Ciprofloxacin 2009   Rash    Pt states \"I get a rash\".      Clindamycin 2011   Nausea    Even with food      Doxycycline 08/15/2012   Vomiting, Nausea    RXN=unknown    Ees [Erythromycin] 2010   Vomiting, Nausea    Flagyl [Metronidazole Hcl] 2011   Unspecified    \"eye problems\"      Flomax [Tamsulosin Hydrochloride] 2009   Swelling    Metformin 2013   Unspecified    Increased lactic acid       Sulfa Drugs 2010   Hives, Rash    RXN=since childhood    Tape 08/15/2012   Rash    Tears skin off   coban with Tegaderm tape ok  RXN=ongoing    Vancomycin 07/10/2016   Itching    Pt becomes flushed in face and chest.   RXN=7/10/16    Cephalexin [Keflex] 2017   Rash    Pt states she gets a rash with this medication    Erythromycin 2017       Levofloxacin 10/27/2016   Unspecified    Leg muscle cramps    Metronidazole 2017       Valproic Acid 2017         You were diagnosed with     Uncontrolled type 2 diabetes mellitus without complication, without long-term current use of insulin (CMS-[facility])   [[id number]]         Vital Signs     Blood Pressure Pulse Temperature Respirations Height Weight    128/64 mmHg 80 37.1 °C (98.8 °F) 16 1.6 m (5' 3\") 115.667 kg (255 lb)    Body Mass Index " Oxygen Saturation Last Menstrual Period Smoking Status          45.18 kg/m2 97% 06/13/2016 Passive Smoke Exposure - Never Smoker        Basic Information     Date Of Birth Sex Race Ethnicity Preferred Language    1990 Female White Non- English      Your appointments     Apr 28, 2017  8:00 AM   SN-HH-HOME VISIT with Shelbi Ruby R.N.   [facility] (--)    [address] [facility]  [place] [address]   [phone]            Apr 28, 2017  9:00 AM   DIETICIAN-HH-HOME VIS/ASSESS with Zohra Franco RD   [facility] (--)    [address] [facility]  [place] [address]   [phone]            May 01, 2017  9:30 AM   Follow Up Med Management with Ronny Burnette M.D.   [facility] [address] ([facility])    [address] [facility]  [address]  [place] [address]   [phone]            May 01, 2017 11:20 AM   FOLLOW UP with Torres Kumar M.D.   [facility] (--)    [address] [place] [address] [address]  [place] [address]   [phone]            May 05, 2017  7:30 AM   Adult Draw/Collection with LAB CJ   [facility] (--)    [address]  [place] [address]   [phone]            May 09, 2017 11:00 AM   EST MISC Infusion 2 HR with RN 5   [facility] ([facility])    [address] [facility] [address]  [place] [address]   [phone]            May 16, 2017  1:20 PM   New Patient with Chencho Norman M.D.   [facility] (--)    [address] [facility] [address]  [place] [address] [phone]           Please bring enclosed paperwork completed along with your insurance card and photo ID.            May 31, 2017  9:00 AM   Individual Therapy with Blanca Brantley L.C.S.W.   [facility]    [address]  [place] [address] [phone]            Jul 17, 2017 10:00 AM   Individual Therapy with ELIN Staton.S.W.   [facility])    [address]  [facility] [address] [phone]            Jul 18,  2017  9:40 AM   Established Patient with Torres Brody M.D.   [facility] [address]    [address] [facility]  [address]  [place] [address] [phone]           You will be receiving a confirmation call a few days before your appointment from our automated call confirmation system.            Jul 19, 2017  8:40 AM   FOLLOW UP with Torres Kumar M.D.   [facility] (--)    [address]  [place] [address] [phone]            Aug 11, 2017 10:00 AM   Follow Up Visit with Sandoval Fox M.D.   [facility] (--)    [address] [facility]  [place] [address] [phone]           You will be receiving a confirmation call a few days before your appointment from our automated call confirmation system.              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 Low  9/18/2010 - Present    Neurogenic bladder N31.9 Low  4/2/2011 - Present    Sinus tachycardia (Chronic) R00.0 High  10/31/2013 - Present    Knee pain, right M25.561 Low  2/13/2014 - Present    Anxiety F41.9 Low  12/16/2014 - Present    Fatty liver disease, nonalcoholic K76.0 Low  1/19/2015 - Present    Progressive focal motor weakness M62.81 Low  6/28/2015 - Present    Acquired hypothyroidism E03.9 Low  11/23/2015 - Present    PCOS (polycystic ovarian syndrome) E28.2 Low  11/23/2015 - Present    H/O prior ablation treatment Z98.890   2/10/2016 - Present    Peripheral neuropathy (CMS-HCC) (Chronic) G62.9   3/6/2016 - Present    TONYA on CPAP G47.33 Low  3/7/2016 - Present    Morbidly obese (CMS-HCC) E66.01   3/7/2016 - Present    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   3/7/2016 - Present    Scoliosis M41.9   3/7/2016 - Present    GERD (gastroesophageal reflux disease) (Chronic) K21.9   3/7/2016 - Present    Vitamin D deficiency E55.9   5/21/2016 -  Present    Chronic inflammatory arthritis (Chronic) M19.90 Medium  5/23/2016 - Present    Weakness of both lower extremities M62.81   6/22/2016 - Present    Elevated sedimentation rate R70.0   6/27/2016 - Present    Galactorrhea O92.6   7/22/2016 - Present    Psychosis, schizophrenia, simple (HCC) (Chronic) F20.89 Low Chronic 9/29/2016 - Present    Schizophrenia (CMS-HCC) F20.9 Low  10/27/2016 - Present    Paralysis (CMS-HCC) G83.9 High  10/27/2016 - Present    Chronic pain syndrome (Chronic) G89.4   10/27/2016 - Present    Bowel and bladder incontinence R32, R15.9   10/27/2016 - Present    Depression F32.9 Low  10/28/2016 - Present    HTN (hypertension) (Chronic) I10   11/1/2016 - Present    Hypovitaminosis D E55.9   11/29/2016 - Present    Leg weakness M62.81   1/4/2017 - Present    Weakness R53.1   1/22/2017 - Present    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 High  1/24/2017 - Present    Chronic suprapubic catheter (CMS-HCC) (Chronic) Z93.59   2/16/2017 - Present    Leg weakness, bilateral M62.81   2/22/2017 - Present    Weakness of right upper extremity M62.81   2/23/2017 - Present    Chest pain R07.9   3/30/2017 - Present    On home oxygen therapy (Chronic) Z99.81   4/15/2017 - Present    Uncontrolled type 2 diabetes mellitus without complication, without long-term current use of insulin (CMS-HCC) E11.65   4/26/2017 - Present      Health Maintenance        Date Due Completion Dates    DIABETES MONOFILAMENT / LE EXAM 4/13/1990 ---    IMM HEP A VACCINE (1 of 2 - Standard Series) 10/13/1990 ---    IMM VARICELLA (CHICKENPOX) VACCINE (1 of 2 - 2 Dose Adolescent Series) 10/13/2002 ---    RETINAL SCREENING 10/13/2007 ---    URINE ACR / MICROALBUMIN 10/13/2007 ---    A1C SCREENING 10/6/2017 4/6/2017, 12/7/2016, 10/14/2016, 3/9/2016, 9/5/2014    FASTING LIPID PROFILE 3/7/2018 3/7/2017, 2/24/2017, 12/7/2016, 10/28/2016, 10/14/2016, 3/21/2014    SERUM CREATININE 4/14/2018 4/14/2017, 4/13/2017, 4/12/2017,  "4/5/2017, 3/27/2017, 3/18/2017, 3/17/2017, 3/16/2017, 3/11/2017, 3/10/2017, 3/9/2017, 3/7/2017, 2/25/2017, 2/24/2017, 2/23/2017, 2/22/2017, 1/30/2017, 1/27/2017, 1/25/2017, 1/22/2017, 1/22/2017, 1/18/2017, 1/18/2017, 12/7/2016, 12/6/2016, 11/4/2016, 11/3/2016, 11/2/2016, 11/1/2016, 10/28/2016, 10/27/2016, 10/14/2016, 10/4/2016, 10/3/2016, 9/29/2016, 8/16/2016, 7/28/2016, 7/28/2016, 7/10/2016, 6/25/2016, 6/23/2016, 6/22/2016, 6/7/2016, 5/18/2016, 4/19/2016, 4/3/2016, 3/30/2016, 3/29/2016, 3/28/2016, 3/14/2016, 3/10/2016, 3/9/2016, 3/7/2016, 3/6/2016, 2/15/2016, 2/12/2016, 1/29/2016, 1/28/2016, 1/26/2016, 11/28/2015, 11/27/2015, 11/23/2015, 11/22/2015, 7/9/2015, 7/8/2015, 6/27/2015, 4/14/2015, 3/23/2015, 2/18/2015, 10/27/2014, 9/5/2014, 3/21/2014, 12/24/2013, 1/12/2013, 8/24/2012, 8/23/2012, 8/22/2012, 8/15/2012, 4/21/2012, 4/20/2012, 4/19/2012, 9/17/2011, 4/3/2011, 4/2/2011, 3/31/2011, 9/20/2010, 9/19/2010, 9/18/2010, 8/28/2010, 7/20/2010, 1/30/2007    PAP SMEAR 7/22/2019 7/22/2016 (Postponed), 2/19/2013 (N/S)    Override on 7/22/2016: Postponed (per pt was told could \"skip\" 2016)    Override on 2/19/2013: (N/S) ([place])    IMM DTaP/Tdap/Td Vaccine (7 - Td) 8/6/2022 8/6/2012, 9/18/2010, 3/3/1994, 5/17/1991, 5/10/1990, 3/23/1990, 1990    COLONOSCOPY 9/25/2023 9/25/2013            Current Immunizations     Dtap Vaccine 3/3/1994, 5/17/1991, 5/10/1990, 3/23/1990, 1990    HIB Vaccine(PEDVAX) 1/11/1991    HPV Quadrivalent Vaccine (GARDASIL) 10/27/2011, 5/27/2011, 3/1/2011    Hepatitis B Vaccine Non-Recombivax (Ped/Adol) 1/28/2004, 10/28/2003, 10/29/1999    Influenza TIV (IM) 9/5/2013, 12/7/2011, 11/7/2011    Influenza Vaccine Adult HD 10/5/2016    MMR Vaccine 3/3/1994, 1/11/1991    OPV - Historical Data 3/3/1994, 5/17/1991, 5/10/1990, 3/23/1990, 1990    Pneumococcal Vaccine (PCV7) Historical Data 11/8/2015    Pneumococcal polysaccharide vaccine (PPSV-23) 1/23/2017  5:35 PM    TD Vaccine 9/18/2010  4:45 PM  "    Tdap Vaccine 8/6/2012      Below and/or attached are the medications your provider expects you to take. Review all of your home medications and newly ordered medications with your provider and/or pharmacist. Follow medication instructions as directed by your provider and/or pharmacist. Please keep your medication list with you and share with your provider. Update the information when medications are discontinued, doses are changed, or new medications (including over-the-counter products) are added; and carry medication information at all times in the event of emergency situations     Allergies:  CEFDINIR - Shortness of Breath,Itching     DEPAKOTE - Unspecified     ABILIFY - Unspecified     AMITRIPTYLINE - Unspecified     AMOXICILLIN - Rash     CIPROFLOXACIN - Rash     CLINDAMYCIN - Nausea     DOXYCYCLINE - Vomiting,Nausea     EES - Vomiting,Nausea     FLAGYL - Unspecified     FLOMAX - Swelling     METFORMIN - Unspecified     SULFA DRUGS - Hives,Rash     TAPE - Rash     VANCOMYCIN - Itching     CEPHALEXIN - Rash     ERYTHROMYCIN - (reactions not documented)     LEVOFLOXACIN - Unspecified     METRONIDAZOLE - (reactions not documented)     VALPROIC ACID - (reactions not documented)               Medications  Valid as of: April 26, 2017 - 10:26 AM    Generic Name Brand Name Tablet Size Instructions for use    Albuterol Sulfate (Aero Soln) albuterol 108 (90 BASE) MCG/ACT Inhale 2 Puffs by mouth every 6 hours as needed for Shortness of Breath.        Aspirin (Tablet Delayed Response) ECOTRIN 81 MG Take 1 Tab by mouth every day.        Baclofen (Tab) LIORESAL 10 MG Take 1 Tab by mouth 3 times a day.        Blood Glucose Monitoring Suppl (Misc) Blood Glucose Monitoring Suppl SUPPLIES Test strips order: Test strips for accucheck meter. Sig: use qday        Cranberry (Cap) Cranberry 1000 MG Take 1 Cap by mouth 2 times a day, with meals.        Cranberry   Take 50,400 mg by mouth 2 times a day.        Cyanocobalamin (Tab)  vitamin b12 500 MCG Take 1,000 mcg by mouth 2 times a day.        Ergocalciferol (Cap) DRISDOL 04561 UNITS Take 50,000 Units by mouth every Friday.        FentaNYL (PATCH 72 HR) DURAGESIC 25 MCG/HR Apply 1 Patch to skin as directed every 72 hours.        FLUoxetine HCl (Cap) PROZAC 10 MG TAKE ONE CAPSULE BY MOUTH ONCE A DAY        Furosemide (Tab) LASIX 20 MG Take 20 mg by mouth every day.        Gabapentin (Once-Daily) (Tab) Gabapentin (Once-Daily) 600 MG Take 600 mg by mouth 3 times a day.        Ivabradine HCl (Tab) CORLANOR 5 MG Take 1 Tab by mouth 2 times a day, with meals.        Lactulose (Solution) lactulose 10 GM/15ML Take 10 g by mouth 2 times a day.        Levothyroxine Sodium (Tab) SYNTHROID 75 MCG Take 75 mcg by mouth Every morning on an empty stomach.        Liraglutide (Solution Pen-injector) VICTOZA 18 MG/3ML Inject 0.3 mL as instructed every day.        Melatonin (Tab) Melatonin 5 MG Take 10 mg by mouth every bedtime.        Nitroglycerin (SL Tab) NITROSTAT 0.4 MG Place 1 Tab under tongue as needed for Chest Pain.        non-formulary med non-formulary med  Inhale 2 L by mouth Continuous.        Nystatin (Powder) MYCOSTATIN  Apply 1 Application to affected area(s) 3 times a day.        Omeprazole (CAPSULE DELAYED RELEASE) PRILOSEC 20 MG Take 20 mg by mouth 2 times a day.        Oxycodone-Acetaminophen (Tab) PERCOCET-10  MG Take 2 Tabs by mouth every 6 hours.        Promethazine HCl (Tab) PHENERGAN 25 MG Take 1 Tab by mouth every 6 hours as needed for Nausea/Vomiting.        RaNITidine HCl (Tab) ZANTAC 150 MG Take 150 mg by mouth 2 times a day.        Sodium Bicarbonate (Tab) sodium bicarbonate 325 MG Take 2 Tabs by mouth 3 times a day.        Ziprasidone HCl (Cap) GEODON 80 MG Take 160 mg by mouth every evening. DO NOT GIVE PAST 1700        .                 Medicines prescribed today were sent to:     [facility] - [place], [place] - [address]    [address] [place]  [address]    Phone: [phone] Fax: [phone]    Open 24 Hours?: No      Medication refill instructions:       If your prescription bottle indicates you have medication refills left, it is not necessary to call your provider’s office. Please contact your pharmacy and they will refill your medication.    If your prescription bottle indicates you do not have any refills left, you may request refills at any time through one of the following ways: The online [employer] system (except Urgent Care), by calling your provider’s office, or by asking your pharmacy to contact your provider’s office with a refill request. Medication refills are processed only during regular business hours and may not be available until the next business day. Your provider may request additional information or to have a follow-up visit with you prior to refilling your medication.   *Please Note: Medication refills are assigned a new Rx number when refilled electronically. Your pharmacy may indicate that no refills were authorized even though a new prescription for the same medication is available at the pharmacy. Please request the medicine by name with the pharmacy before contacting your provider for a refill.        Other Notes About Your Plan     DME:  [facility] / ph [phone] / fax [phone]              [employer] Access Code: Activation code not generated  Current [employer] Status: Active

## 2017-04-26 NOTE — PROGRESS NOTES
Subjective:      Kristin Balderrama is a 26 y.o. female who presents with Fall and Dysuria            Fall    Dysuria     Kristin Balderrama is here today accompanied by her mother to talk about her blood sugars.    Patient was recently started on Victoza for type 2 diabetes when her hemoglobin A1c came back elevated a few weeks ago at 7.2. According to the home care notes, patient spends most of her day in bed and eats frequently throughout the day. She is concerned because she states she has been having some low blood sugar reactions and decreased her Victoza dosage because of this. Home health has not been able to verify the low blood sugar reaction or results. Patient's mother thinks her blood sugars have been very labile, sometimes as high as 300 and sometimes in the low 100s.    Patient's mother thinks this is because patient eats too much and too frequently. She admits that she will get up 2 or 3 times during the night to take medication and will often eat a TV dinner or a full meal just to have something to take with her pills. She does not mention any other concerns to me this morning.        Social History   Substance Use Topics   • Smoking status: Passive Smoke Exposure - Never Smoker     Types: Cigarettes   • Smokeless tobacco: Never Used   • Alcohol Use: No     Current Outpatient Prescriptions   Medication Sig Dispense Refill   • aspirin EC (ECOTRIN) 81 MG Tablet Delayed Response Take 1 Tab by mouth every day. 30 Tab 6   • Blood Glucose Monitoring Suppl SUPPLIES Misc Test strips order: Test strips for accucheck meter. Sig: use qday 50 Each 11   • liraglutide (VICTOZA) 18 MG/3ML Solution Pen-injector injection Inject 0.3 mL as instructed every day. 3 PEN 11   • baclofen (LIORESAL) 10 MG Tab Take 1 Tab by mouth 3 times a day. 90 Tab 6   • CRANBERRY PO Take 50,400 mg by mouth 2 times a day.     • non-formulary med Inhale 2 L by mouth Continuous.     • Gabapentin, Once-Daily, (GRALISE) 600 MG Tab Take  600 mg by mouth 3 times a day.     • ivabradine (CORLANOR) 5 MG Tab tablet Take 1 Tab by mouth 2 times a day, with meals. 60 Tab 6   • fluoxetine (PROZAC) 10 MG Cap TAKE ONE CAPSULE BY MOUTH ONCE A DAY 30 Cap 2   • furosemide (LASIX) 20 MG Tab Take 20 mg by mouth every day.     • ranitidine (ZANTAC) 150 MG Tab Take 150 mg by mouth 2 times a day.     • oxycodone-acetaminophen (PERCOCET-10)  MG Tab Take 2 Tabs by mouth every 6 hours.     • nitroglycerin (NITROSTAT) 0.4 MG SL Tab Place 1 Tab under tongue as needed for Chest Pain. 25 Tab 1   • nystatin (MYCOSTATIN) Powder Apply 1 Application to affected area(s) 3 times a day. 1 Bottle 2   • Cranberry 1000 MG Cap Take 1 Cap by mouth 2 times a day, with meals. 60 Cap 3   • Melatonin 5 MG Tab Take 10 mg by mouth every bedtime.     • ziprasidone (GEODON) 80 MG Cap Take 160 mg by mouth every evening. DO NOT GIVE PAST 1700     • fentanyl (DURAGESIC) 25 MCG/HR PATCH 72 HR Apply 1 Patch to skin as directed every 72 hours.     • lactulose 10 GM/15ML Solution Take 10 g by mouth 2 times a day.     • vitamin D, Ergocalciferol, (DRISDOL) 79668 UNITS Cap capsule Take 50,000 Units by mouth every Friday.     • promethazine (PHENERGAN) 25 MG Tab Take 1 Tab by mouth every 6 hours as needed for Nausea/Vomiting. 30 Tab 0   • albuterol 108 (90 BASE) MCG/ACT Aero Soln inhalation aerosol Inhale 2 Puffs by mouth every 6 hours as needed for Shortness of Breath.     • cyanocobalamin (HM VITAMIN B12) 500 MCG tablet Take 1,000 mcg by mouth 2 times a day.     • sodium bicarbonate 325 MG Tab Take 2 Tabs by mouth 3 times a day.     • levothyroxine (SYNTHROID) 75 MCG Tab Take 75 mcg by mouth Every morning on an empty stomach.     • omeprazole (PRILOSEC) 20 MG delayed-release capsule Take 20 mg by mouth 2 times a day.       No current facility-administered medications for this visit.     Past Medical History   Diagnosis Date   • Scoliosis    • Multiple personality disorder    • Chronic UTI  "9/18/2010   • Heart burn    • Pain 08-15-12     back, 7/10   • History of falling    • Sinus tachycardia 10/31/2013   • Urinary incontinence    • Hypertension    • Disorder of thyroid    • Obesity    • Pneumonia 2012   • ASTHMA      when around smoke   • Breath shortness      with tachycardia   • Anginal syndrome      random chest pain especially with tachycardia   • Psychosis    • Arthritis      osteo   • PCOS (polycystic ovarian syndrome)    • Gynecological disorder      PCOS   • Renal disorder      \"kidney disease, stage 1\" nephrologist, Dr. Vallejo   • Arrhythmia      \"sinus tachycardia\", cariologist, Dr. 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   • Sleep apnea      CPAP \"pulmonary doctor took me off mid year 2016\"   • 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CMS-HCC)    • Fall      Family History   Problem Relation Age of Onset   • Hypertension Mother    • Sleep Apnea Mother    • Heart Disease Mother    • Other Mother      hypothryod   • Hypertension Maternal Uncle    • Heart Disease Maternal Grandmother    • Hypertension Maternal Grandmother    • Other Sister      Narcolepsy;fibromyalsia;bone;nerve   • Genitourinary () Sister      endometriosis       Review of Systems   All other systems reviewed and are negative.         Objective:     /64 mmHg  Pulse 80  Temp(Src) 37.1 °C (98.8 °F)  Resp 16  Ht 1.6 m (5' 3\")  Wt 115.667 kg (255 lb)  BMI 45.18 kg/m2  SpO2 97%  LMP 06/13/2016     Physical Exam   Constitutional: She is oriented to person, place, and time. She appears well-developed and well-nourished. No distress.   HENT:   Head: Normocephalic and atraumatic.   Right Ear: External ear normal.   Left Ear: External ear normal.   Neurological: She is alert and oriented to person, place, and time.   Skin: Skin is warm and dry. She is not diaphoretic.   Psychiatric: Her mood appears anxious.   Nursing note and vitals " reviewed.              Assessment/Plan:     1. Uncontrolled type 2 diabetes mellitus without complication, without long-term current use of insulin (CMS-[facility])  It is unclear how much of patient's concerns about hypoglycemia are actually occurring versus perceived. I did explain that it will be difficult to keep her blood sugars regularly if she is eating frequently throughout the day and eating large and high carb meals such as TV dinners in the middle of the night. I explained that she needs to keep her carb count steady and that unfortunately insurance will not pay for more than #50 test strips per month because she is not on insulin. I told her we could switch to a short acting insulin but the problem will still occur if she is eating too many high carb meals. She will meet back with the dietitian and try to change her diet. We talked about not overusing carbohydrates should her blood sugar dropped below 70.

## 2017-04-27 PROCEDURE — 665998 HH PPS REVENUE CREDIT

## 2017-04-27 PROCEDURE — 665999 HH PPS REVENUE DEBIT

## 2017-04-28 ENCOUNTER — HOME CARE VISIT (OUTPATIENT)
Dept: HOME HEALTH SERVICES | Facility: HOME HEALTHCARE | Age: 27
End: 2017-04-28
Payer: MEDICARE

## 2017-04-28 VITALS
RESPIRATION RATE: 16 BRPM | SYSTOLIC BLOOD PRESSURE: 128 MMHG | HEART RATE: 74 BPM | TEMPERATURE: 98.1 F | DIASTOLIC BLOOD PRESSURE: 70 MMHG

## 2017-04-28 PROCEDURE — 665999 HH PPS REVENUE DEBIT

## 2017-04-28 PROCEDURE — G0162 HHC RN E&M PLAN SVS, 15 MIN: HCPCS

## 2017-04-28 PROCEDURE — 665997 HH PPS REVENUE ADJ

## 2017-04-28 PROCEDURE — 665998 HH PPS REVENUE CREDIT

## 2017-04-28 SDOH — ECONOMIC STABILITY: HOUSING INSECURITY: UNSAFE COOKING RANGE AREA: 0

## 2017-04-28 SDOH — ECONOMIC STABILITY: HOUSING INSECURITY: UNSAFE APPLIANCES: 0

## 2017-04-28 ASSESSMENT — ENCOUNTER SYMPTOMS: ADDITIONAL INFORMATION: GENERALIZED

## 2017-04-28 ASSESSMENT — ACTIVITIES OF DAILY LIVING (ADL)
HOME_HEALTH_OASIS: 01
OASIS_M1830: 02

## 2017-05-01 ENCOUNTER — APPOINTMENT (OUTPATIENT)
Dept: BEHAVIORAL HEALTH | Facility: PHYSICIAN GROUP | Age: 27
End: 2017-05-01
Payer: MEDICARE

## 2017-05-02 ENCOUNTER — PATIENT OUTREACH (OUTPATIENT)
Dept: HEALTH INFORMATION MANAGEMENT | Facility: OTHER | Age: 27
End: 2017-05-02

## 2017-05-02 DIAGNOSIS — N61.0 CELLULITIS OF LEFT BREAST: ICD-10-CM

## 2017-05-02 RX ORDER — PROMETHAZINE HYDROCHLORIDE 25 MG/1
25 TABLET ORAL EVERY 6 HOURS PRN
Qty: 30 TAB | Refills: 6 | Status: SHIPPED | OUTPATIENT
Start: 2017-05-02 | End: 2017-12-04

## 2017-05-03 NOTE — PROGRESS NOTES
[facility] Patient Advocate SW the patient today and she expressed concerns that she was throwing up and extremely nauseas. [facility] Patient Advocate placed a call to the PCP's MA.  MA will get a msg to the PCP to have a refill of Phenergan called in.

## 2017-05-05 ENCOUNTER — HOSPITAL ENCOUNTER (OUTPATIENT)
Dept: LAB | Facility: MEDICAL CENTER | Age: 27
End: 2017-05-05
Attending: INTERNAL MEDICINE
Payer: MEDICARE

## 2017-05-05 LAB
25(OH)D3 SERPL-MCNC: 24 NG/ML (ref 30–100)
ALBUMIN SERPL BCP-MCNC: 4.5 G/DL (ref 3.2–4.9)
AMORPH CRY #/AREA URNS HPF: PRESENT /HPF
APPEARANCE UR: ABNORMAL
BACTERIA #/AREA URNS HPF: ABNORMAL /HPF
BASOPHILS # BLD AUTO: 0.5 % (ref 0–1.8)
BASOPHILS # BLD: 0.04 K/UL (ref 0–0.12)
BILIRUB UR QL STRIP.AUTO: NEGATIVE
BUN SERPL-MCNC: 17 MG/DL (ref 8–22)
CALCIUM SERPL-MCNC: 9.5 MG/DL (ref 8.5–10.5)
CHLORIDE SERPL-SCNC: 104 MMOL/L (ref 96–112)
CO2 SERPL-SCNC: 20 MMOL/L (ref 20–33)
COLOR UR: YELLOW
CREAT SERPL-MCNC: 0.72 MG/DL (ref 0.5–1.4)
CREAT UR-MCNC: 164.5 MG/DL
EOSINOPHIL # BLD AUTO: 0.23 K/UL (ref 0–0.51)
EOSINOPHIL NFR BLD: 3 % (ref 0–6.9)
EPI CELLS #/AREA URNS HPF: ABNORMAL /HPF
ERYTHROCYTE [DISTWIDTH] IN BLOOD BY AUTOMATED COUNT: 43.3 FL (ref 35.9–50)
FERRITIN SERPL-MCNC: 56.7 NG/ML (ref 10–291)
GFR SERPL CREATININE-BSD FRML MDRD: >60 ML/MIN/1.73 M 2
GLUCOSE SERPL-MCNC: 101 MG/DL (ref 65–99)
GLUCOSE UR STRIP.AUTO-MCNC: NEGATIVE MG/DL
HCT VFR BLD AUTO: 39.3 % (ref 37–47)
HGB BLD-MCNC: 13.2 G/DL (ref 12–16)
IMM GRANULOCYTES # BLD AUTO: 0.02 K/UL (ref 0–0.11)
IMM GRANULOCYTES NFR BLD AUTO: 0.3 % (ref 0–0.9)
IRON SATN MFR SERPL: 17 % (ref 15–55)
IRON SERPL-MCNC: 83 UG/DL (ref 40–170)
KETONES UR STRIP.AUTO-MCNC: NEGATIVE MG/DL
LEUKOCYTE ESTERASE UR QL STRIP.AUTO: ABNORMAL
LYMPHOCYTES # BLD AUTO: 2.28 K/UL (ref 1–4.8)
LYMPHOCYTES NFR BLD: 30.2 % (ref 22–41)
MAGNESIUM SERPL-MCNC: 1.9 MG/DL (ref 1.5–2.5)
MCH RBC QN AUTO: 30.7 PG (ref 27–33)
MCHC RBC AUTO-ENTMCNC: 33.6 G/DL (ref 33.6–35)
MCV RBC AUTO: 91.4 FL (ref 81.4–97.8)
MICRO URNS: ABNORMAL
MONOCYTES # BLD AUTO: 0.46 K/UL (ref 0–0.85)
MONOCYTES NFR BLD AUTO: 6.1 % (ref 0–13.4)
MUCOUS THREADS #/AREA URNS HPF: ABNORMAL /HPF
NEUTROPHILS # BLD AUTO: 4.53 K/UL (ref 2–7.15)
NEUTROPHILS NFR BLD: 59.9 % (ref 44–72)
NITRITE UR QL STRIP.AUTO: NEGATIVE
NRBC # BLD AUTO: 0 K/UL
NRBC BLD AUTO-RTO: 0 /100 WBC
PH UR STRIP.AUTO: 5.5 [PH]
PHOSPHATE SERPL-MCNC: 4 MG/DL (ref 2.5–4.5)
PLATELET # BLD AUTO: 194 K/UL (ref 164–446)
PMV BLD AUTO: 11.7 FL (ref 9–12.9)
POTASSIUM SERPL-SCNC: 3.6 MMOL/L (ref 3.6–5.5)
PROT UR QL STRIP: 30 MG/DL
PROT UR-MCNC: 17 MG/DL (ref 0–15)
PROT/CREAT UR: 103 MG/G (ref 10–107)
PTH-INTACT SERPL-MCNC: 61.2 PG/ML (ref 14–72)
RBC # BLD AUTO: 4.3 M/UL (ref 4.2–5.4)
RBC # URNS HPF: ABNORMAL /HPF
RBC UR QL AUTO: ABNORMAL
SODIUM SERPL-SCNC: 134 MMOL/L (ref 135–145)
SP GR UR STRIP.AUTO: 1.02
TIBC SERPL-MCNC: 486 UG/DL (ref 250–450)
URATE SERPL-MCNC: 7.1 MG/DL (ref 1.9–8.2)
WBC # BLD AUTO: 7.6 K/UL (ref 4.8–10.8)
WBC #/AREA URNS HPF: ABNORMAL /HPF

## 2017-05-05 PROCEDURE — 84156 ASSAY OF PROTEIN URINE: CPT

## 2017-05-05 PROCEDURE — 83970 ASSAY OF PARATHORMONE: CPT

## 2017-05-05 PROCEDURE — 81001 URINALYSIS AUTO W/SCOPE: CPT

## 2017-05-05 PROCEDURE — 80069 RENAL FUNCTION PANEL: CPT

## 2017-05-05 PROCEDURE — 82306 VITAMIN D 25 HYDROXY: CPT | Mod: GA

## 2017-05-05 PROCEDURE — 36415 COLL VENOUS BLD VENIPUNCTURE: CPT

## 2017-05-05 PROCEDURE — 82570 ASSAY OF URINE CREATININE: CPT

## 2017-05-05 PROCEDURE — 85025 COMPLETE CBC W/AUTO DIFF WBC: CPT

## 2017-05-05 PROCEDURE — 83550 IRON BINDING TEST: CPT

## 2017-05-05 PROCEDURE — 83735 ASSAY OF MAGNESIUM: CPT

## 2017-05-05 PROCEDURE — 82728 ASSAY OF FERRITIN: CPT

## 2017-05-05 PROCEDURE — 84550 ASSAY OF BLOOD/URIC ACID: CPT

## 2017-05-05 PROCEDURE — 83540 ASSAY OF IRON: CPT

## 2017-05-09 ENCOUNTER — OUTPATIENT INFUSION SERVICES (OUTPATIENT)
Dept: ONCOLOGY | Facility: MEDICAL CENTER | Age: 27
End: 2017-05-09
Attending: PSYCHIATRY & NEUROLOGY
Payer: MEDICARE

## 2017-05-09 VITALS
RESPIRATION RATE: 20 BRPM | HEIGHT: 65 IN | BODY MASS INDEX: 42.35 KG/M2 | HEART RATE: 92 BPM | WEIGHT: 254.19 LBS | DIASTOLIC BLOOD PRESSURE: 85 MMHG | SYSTOLIC BLOOD PRESSURE: 140 MMHG | OXYGEN SATURATION: 97 % | TEMPERATURE: 97.1 F

## 2017-05-09 DIAGNOSIS — R30.0 DYSURIA: ICD-10-CM

## 2017-05-09 LAB
APPEARANCE UR: ABNORMAL
BACTERIA #/AREA URNS HPF: ABNORMAL /HPF
BILIRUB UR QL STRIP.AUTO: NEGATIVE
COLOR UR: YELLOW
GLUCOSE UR STRIP.AUTO-MCNC: NEGATIVE MG/DL
KETONES UR STRIP.AUTO-MCNC: NEGATIVE MG/DL
LEUKOCYTE ESTERASE UR QL STRIP.AUTO: ABNORMAL
MICRO URNS: ABNORMAL
MUCOUS THREADS #/AREA URNS HPF: ABNORMAL /HPF
NITRITE UR QL STRIP.AUTO: NEGATIVE
PH UR STRIP.AUTO: 6 [PH]
PROT UR QL STRIP: NEGATIVE MG/DL
RBC UR QL AUTO: NEGATIVE
SP GR UR STRIP.AUTO: 1.03
WBC #/AREA URNS HPF: ABNORMAL /HPF

## 2017-05-09 PROCEDURE — 87077 CULTURE AEROBIC IDENTIFY: CPT

## 2017-05-09 PROCEDURE — 700105 HCHG RX REV CODE 258: Performed by: PSYCHIATRY & NEUROLOGY

## 2017-05-09 PROCEDURE — 87186 SC STD MICRODIL/AGAR DIL: CPT

## 2017-05-09 PROCEDURE — 81001 URINALYSIS AUTO W/SCOPE: CPT

## 2017-05-09 PROCEDURE — 87086 URINE CULTURE/COLONY COUNT: CPT

## 2017-05-09 PROCEDURE — 96365 THER/PROPH/DIAG IV INF INIT: CPT

## 2017-05-09 PROCEDURE — 700111 HCHG RX REV CODE 636 W/ 250 OVERRIDE (IP): Performed by: PSYCHIATRY & NEUROLOGY

## 2017-05-09 RX ADMIN — SODIUM CHLORIDE 1000 MG: 9 INJECTION, SOLUTION INTRAVENOUS at 11:51

## 2017-05-09 ASSESSMENT — PAIN SCALES - GENERAL: PAINLEVEL: 5=MODERATE PAIN

## 2017-05-10 NOTE — PROGRESS NOTES
"Pt presents ambulatory via walker for Solumedrol, pt reports having medication multiple times in hospital with no adverse reactions. IV established and medication infused per orders, pt reported \"pain\" at IV site after 40 mins of infusion, site assessed and no signs of infiltration noted. Warm packed offered to pt and she reported effective relief. At completion of infusion pt asked for an emesis bag and vomited undigested lunch. Pt reported that her new \"diabetic medication\" has been making her nauseas too. RN assisted pt in cleaning up and offered to call for antiemetics, she reported having phenergan at home and declined intervention, pt reported feeling \"better\" after vomiting. Pt had reported discoloration in catheter bag yesterday and today prior, RN observed regular urine output in bag with no discoloration present. RN did update pt's urologist Dr Rosenbaum and spoke with MD nurse Merced, order received to draw urinalysis for pt, RN obtained sample from luer on cath tubing. IV flushed with saline and d/c'd with gauze and coband applied to site. Pt reported feeling well to go home. Pt reported that he infusions should be 3 weeks apart despite order stating 4 weeks, RN to call and clarify with MD office for future appts.  Pt left ambulatory via walker with grandmother in no distress at 1315    1500 RN reviewed urinalysis results, positive WBC in urine. RN notified MD office and left message for Merced HASSAN to return call to discuss results for need for antibiotic, no return call received. RN will f/u with MD office on 5/10/17 to discuss results.     5/11/17 1030 RN clarified orders with Dr. Stevenson via telephone and pt to receive treatment every 4 weeks per MD, PRN zofran order given for future infusion, see order in chart.    5/11/17 1648 RN sent via fax final positive results of urinalysis to Merced HASSAN at Dr. Rosenbaum's office for pt to be started on antibiotics.      5/11/17 1650 RN contacted pt and updated pt that " she will be receiving treatment every 4 weeks per Dr. Stevenson, pt verbalized understanding. Pt instructed to call Dr. Rosenbaum's office to coordinate prescription for UTI.

## 2017-05-11 ENCOUNTER — HOSPITAL ENCOUNTER (OUTPATIENT)
Dept: RADIOLOGY | Facility: MEDICAL CENTER | Age: 27
End: 2017-05-11
Attending: PAIN MEDICINE
Payer: MEDICARE

## 2017-05-11 ENCOUNTER — OFFICE VISIT (OUTPATIENT)
Dept: MEDICAL GROUP | Facility: MEDICAL CENTER | Age: 27
End: 2017-05-11
Payer: MEDICARE

## 2017-05-11 VITALS
RESPIRATION RATE: 16 BRPM | HEIGHT: 63 IN | OXYGEN SATURATION: 98 % | WEIGHT: 245 LBS | SYSTOLIC BLOOD PRESSURE: 124 MMHG | TEMPERATURE: 97.9 F | HEART RATE: 73 BPM | DIASTOLIC BLOOD PRESSURE: 76 MMHG | BODY MASS INDEX: 43.41 KG/M2

## 2017-05-11 DIAGNOSIS — N32.89 BLADDER SPASM: ICD-10-CM

## 2017-05-11 DIAGNOSIS — M25.561 RIGHT KNEE PAIN, UNSPECIFIED CHRONICITY: ICD-10-CM

## 2017-05-11 DIAGNOSIS — R11.0 NAUSEA: ICD-10-CM

## 2017-05-11 PROCEDURE — 99214 OFFICE O/P EST MOD 30 MIN: CPT | Performed by: INTERNAL MEDICINE

## 2017-05-11 PROCEDURE — 1036F TOBACCO NON-USER: CPT | Performed by: INTERNAL MEDICINE

## 2017-05-11 PROCEDURE — G8432 DEP SCR NOT DOC, RNG: HCPCS | Performed by: INTERNAL MEDICINE

## 2017-05-11 PROCEDURE — 73700 CT LOWER EXTREMITY W/O DYE: CPT | Mod: RT

## 2017-05-11 PROCEDURE — 1111F DSCHRG MED/CURRENT MED MERGE: CPT | Performed by: INTERNAL MEDICINE

## 2017-05-11 PROCEDURE — 3045F PR MOST RECENT HEMOGLOBIN A1C LEVEL 7.0-9.0%: CPT | Performed by: INTERNAL MEDICINE

## 2017-05-11 PROCEDURE — G8419 CALC BMI OUT NRM PARAM NOF/U: HCPCS | Performed by: INTERNAL MEDICINE

## 2017-05-11 NOTE — MR AVS SNAPSHOT
"        Kristin Balderrama   2017 10:00 AM   Office Visit   MRN: [id number]    Department:  [address] [place] [address]   Dept Phone:  [phone]    Description:  Female : 1990   Provider:  Torres Brody M.D.           Reason for Visit     Follow-Up discuss bladder issues     Nausea           Allergies as of 2017     Allergen Noted Reactions    Cefdinir 2016   Shortness of Breath, Itching    Tolerated 17    Depakote [Divalproex Sodium] 2010   Unspecified    Muscle spasms/muscle pain and weakness      Abilify 2013   Unspecified    Headaches/muscle twitching      Amitriptyline 10/31/2013   Unspecified    Headaches      Amoxicillin 2010   Rash    Pt states \"I get a rash\".      Ciprofloxacin 2009   Rash    Pt states \"I get a rash\".      Clindamycin 2011   Nausea    Even with food      Doxycycline 08/15/2012   Vomiting, Nausea    RXN=unknown    Ees [Erythromycin] 2010   Vomiting, Nausea    Flagyl [Metronidazole Hcl] 2011   Unspecified    \"eye problems\"      Flomax [Tamsulosin Hydrochloride] 2009   Swelling    Metformin 2013   Unspecified    Increased lactic acid       Sulfa Drugs 2010   Hives, Rash    RXN=since childhood    Tape 08/15/2012   Rash    Tears skin off   coban with Tegaderm tape ok  RXN=ongoing    Vancomycin 07/10/2016   Itching    Pt becomes flushed in face and chest.   RXN=7/10/16    Cephalexin [Keflex] 2017   Rash    Pt states she gets a rash with this medication    Erythromycin 2017       Levofloxacin 10/27/2016   Unspecified    Leg muscle cramps    Metronidazole 2017       Valproic Acid 2017         You were diagnosed with     Uncontrolled type 2 diabetes mellitus without complication, without long-term current use of insulin (CMS-[facility])   [9702163]       Hematuria   [6873348]         Vital Signs     Blood Pressure Pulse Temperature Respirations Height Weight    124/76 mmHg 73 36.6 °C (97.9 °F) 16 " "1.6 m (5' 2.99\") 111.131 kg (245 lb)    Body Mass Index Oxygen Saturation Last Menstrual Period Smoking Status          43.41 kg/m2 98% 06/13/2016 Passive Smoke Exposure - Never Smoker        Basic Information     Date Of Birth Sex Race Ethnicity Preferred Language    1990 Female White Non- English      Your appointments     May 16, 2017  1:20 PM   Follow UP with Chencho Norman M.D.   [facility] (--)    [address]  [address]  [place] [address] [phone]            May 31, 2017  9:00 AM   Individual Therapy with Blanca Brantley L.C.S.W.   [facility]    [address]  [address]  [place] [id number] [phone]            Jul 17, 2017 10:00 AM   Individual Therapy with Blanca Brantley L.C.S.W.   [facility]    [address]  [address]  [place] [address] [phone]            Jul 18, 2017  9:40 AM   Established Patient with Torres Brody M.D.   [facility] [address]    [address]  [address]  [place] [id number] [phone]           You will be receiving a confirmation call a few days before your appointment from our automated call confirmation system.            Jul 19, 2017  8:40 AM   FOLLOW UP with Torres Kumar M.D.   [facility] (--)    [address]  [place] [address] [phone]            Aug 11, 2017 10:00 AM   Follow Up Visit with Sandoval Fox M.D.   [facility] (--)    [address], [address]  [place] [address] [phone]           You will be receiving a confirmation call a few days before your appointment from our automated call confirmation system.              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 Low  9/18/2010 - Present    Neurogenic bladder N31.9 Low  " 4/2/2011 - Present    Sinus tachycardia (Chronic) R00.0 High  10/31/2013 - Present    Knee pain, right M25.561 Low  2/13/2014 - Present    Anxiety F41.9 Low  12/16/2014 - Present    Fatty liver disease, nonalcoholic K76.0 Low  1/19/2015 - Present    Progressive focal motor weakness R53.1 Low  6/28/2015 - Present    Acquired hypothyroidism E03.9 Low  11/23/2015 - Present    PCOS (polycystic ovarian syndrome) E28.2 Low  11/23/2015 - Present    H/O prior ablation treatment Z98.890   2/10/2016 - Present    Peripheral neuropathy (CMS-HCC) (Chronic) G62.9   3/6/2016 - Present    TONYA on CPAP G47.33, Z99.89 Low  3/7/2016 - Present    Morbidly obese (CMS-HCC) E66.01   3/7/2016 - Present    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   3/7/2016 - Present    Scoliosis M41.9   3/7/2016 - Present    GERD (gastroesophageal reflux disease) (Chronic) K21.9   3/7/2016 - Present    Vitamin D deficiency E55.9   5/21/2016 - Present    Chronic inflammatory arthritis (Chronic) M19.90 Medium  5/23/2016 - Present    Weakness of both lower extremities R29.898   6/22/2016 - Present    Elevated sedimentation rate R70.0   6/27/2016 - Present    Galactorrhea O92.6   7/22/2016 - Present    Psychosis, schizophrenia, simple (HCC) (Chronic) F20.89 Low Chronic 9/29/2016 - Present    Schizophrenia (CMS-HCC) F20.9 Low  10/27/2016 - Present    Paralysis (CMS-HCC) G83.9 High  10/27/2016 - Present    Chronic pain syndrome (Chronic) G89.4   10/27/2016 - Present    Bowel and bladder incontinence R32, R15.9   10/27/2016 - Present    Depression F32.9 Low  10/28/2016 - Present    HTN (hypertension) (Chronic) I10   11/1/2016 - Present    Hypovitaminosis D E55.9   11/29/2016 - Present    Leg weakness R29.898   1/4/2017 - Present    Weakness R53.1   1/22/2017 - Present    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 High  1/24/2017 - Present    Chronic suprapubic catheter (CMS-HCC) (Chronic) Z93.59   2/16/2017 - Present    Leg weakness, bilateral R29.898   2/22/2017  - Present    Weakness of right upper extremity R29.898   2/23/2017 - Present    Chest pain R07.9   3/30/2017 - Present    On home oxygen therapy (Chronic) Z99.81   4/15/2017 - Present    Uncontrolled type 2 diabetes mellitus without complication, without long-term current use of insulin (CMS-[facility]) E11.65   4/26/2017 - Present    Dysuria R30.0   5/9/2017 - Present      Health Maintenance        Date Due Completion Dates    DIABETES MONOFILAMENT / LE EXAM 4/13/1990 ---    IMM HEP A VACCINE (1 of 2 - Standard Series) 10/13/1990 ---    IMM VARICELLA (CHICKENPOX) VACCINE (1 of 2 - 2 Dose Adolescent Series) 10/13/2002 ---    RETINAL SCREENING 10/13/2007 ---    URINE ACR / MICROALBUMIN 10/13/2007 ---    A1C SCREENING 10/6/2017 4/6/2017, 12/7/2016, 10/14/2016, 3/9/2016, 9/5/2014    FASTING LIPID PROFILE 3/7/2018 3/7/2017, 2/24/2017, 12/7/2016, 10/28/2016, 10/14/2016, 3/21/2014    SERUM CREATININE 5/5/2018 5/5/2017, 4/14/2017, 4/13/2017, 4/12/2017, 4/5/2017, 3/27/2017, 3/18/2017, 3/17/2017, 3/16/2017, 3/11/2017, 3/10/2017, 3/9/2017, 3/7/2017, 2/25/2017, 2/24/2017, 2/23/2017, 2/22/2017, 1/30/2017, 1/27/2017, 1/25/2017, 1/22/2017, 1/22/2017, 1/18/2017, 1/18/2017, 12/7/2016, 12/6/2016, 11/4/2016, 11/3/2016, 11/2/2016, 11/1/2016, 10/28/2016, 10/27/2016, 10/14/2016, 10/4/2016, 10/3/2016, 9/29/2016, 8/16/2016, 7/28/2016, 7/28/2016, 7/10/2016, 6/25/2016, 6/23/2016, 6/22/2016, 6/7/2016, 5/18/2016, 4/19/2016, 4/3/2016, 3/30/2016, 3/29/2016, 3/28/2016, 3/14/2016, 3/10/2016, 3/9/2016, 3/7/2016, 3/6/2016, 2/15/2016, 2/12/2016, 1/29/2016, 1/28/2016, 1/26/2016, 11/28/2015, 11/27/2015, 11/23/2015, 11/22/2015, 7/9/2015, 7/8/2015, 6/27/2015, 4/14/2015, 3/23/2015, 2/18/2015, 10/27/2014, 9/5/2014, 3/21/2014, 12/24/2013, 1/12/2013, 8/24/2012, 8/23/2012, 8/22/2012, 8/15/2012, 4/21/2012, 4/20/2012, 4/19/2012, 9/17/2011, 4/3/2011, 4/2/2011, 3/31/2011, 9/20/2010, 9/19/2010, 9/18/2010, 8/28/2010, 7/20/2010, 1/30/2007    PAP SMEAR 7/22/2019 7/22/2016  "(Postponed), 2/19/2013 (N/S)    Override on 7/22/2016: Postponed (per pt was told could \"skip\" 2016)    Override on 2/19/2013: (N/S) ([place])    IMM DTaP/Tdap/Td Vaccine (7 - Td) 8/6/2022 8/6/2012, 9/18/2010, 3/3/1994, 5/17/1991, 5/10/1990, 3/23/1990, 1990    COLONOSCOPY 9/25/2023 9/25/2013            Current Immunizations     Dtap Vaccine 3/3/1994, 5/17/1991, 5/10/1990, 3/23/1990, 1990    HIB Vaccine(PEDVAX) 1/11/1991    HPV Quadrivalent Vaccine (GARDASIL) 10/27/2011, 5/27/2011, 3/1/2011    Hepatitis B Vaccine Non-Recombivax (Ped/Adol) 1/28/2004, 10/28/2003, 10/29/1999    Influenza TIV (IM) 9/5/2013, 12/7/2011, 11/7/2011    Influenza Vaccine Adult HD 10/5/2016    MMR Vaccine 3/3/1994, 1/11/1991    OPV - Historical Data 3/3/1994, 5/17/1991, 5/10/1990, 3/23/1990, 1990    Pneumococcal Vaccine (PCV7) Historical Data 11/8/2015    Pneumococcal polysaccharide vaccine (PPSV-23) 1/23/2017  5:35 PM    TD Vaccine 9/18/2010  4:45 PM    Tdap Vaccine 8/6/2012      Below and/or attached are the medications your provider expects you to take. Review all of your home medications and newly ordered medications with your provider and/or pharmacist. Follow medication instructions as directed by your provider and/or pharmacist. Please keep your medication list with you and share with your provider. Update the information when medications are discontinued, doses are changed, or new medications (including over-the-counter products) are added; and carry medication information at all times in the event of emergency situations     Allergies:  CEFDINIR - Shortness of Breath,Itching     DEPAKOTE - Unspecified     ABILIFY - Unspecified     AMITRIPTYLINE - Unspecified     AMOXICILLIN - Rash     CIPROFLOXACIN - Rash     CLINDAMYCIN - Nausea     DOXYCYCLINE - Vomiting,Nausea     EES - Vomiting,Nausea     FLAGYL - Unspecified     FLOMAX - Swelling     METFORMIN - Unspecified     SULFA DRUGS - Hives,Rash     TAPE - Rash     " VANCOMYCIN - Itching     CEPHALEXIN - Rash     ERYTHROMYCIN - (reactions not documented)     LEVOFLOXACIN - Unspecified     METRONIDAZOLE - (reactions not documented)     VALPROIC ACID - (reactions not documented)               Medications  Valid as of: May 11, 2017 - 10:43 AM    Generic Name Brand Name Tablet Size Instructions for use    Albuterol Sulfate (Aero Soln) albuterol 108 (90 BASE) MCG/ACT Inhale 2 Puffs by mouth every 6 hours as needed for Shortness of Breath.        Aspirin (Tablet Delayed Response) ECOTRIN 81 MG Take 1 Tab by mouth every day.        Baclofen (Tab) LIORESAL 10 MG Take 1 Tab by mouth 3 times a day.        Blood Glucose Monitoring Suppl (Misc) Blood Glucose Monitoring Suppl SUPPLIES Test strips order: Test strips for accucheck meter. Sig: use qday        Cranberry (Cap) Cranberry 1000 MG Take 1 Cap by mouth 2 times a day, with meals.        Cranberry   Take 50,400 mg by mouth 2 times a day.        Cyanocobalamin (Tab) vitamin b12 500 MCG Take 1,000 mcg by mouth 2 times a day.        Ergocalciferol (Cap) DRISDOL 12758 UNITS Take 50,000 Units by mouth every Friday.        FentaNYL (PATCH 72 HR) DURAGESIC 25 MCG/HR Apply 1 Patch to skin as directed every 72 hours.        FLUoxetine HCl (Cap) PROZAC 10 MG TAKE ONE CAPSULE BY MOUTH ONCE A DAY        Furosemide (Tab) LASIX 20 MG Take 20 mg by mouth every day.        Gabapentin (Once-Daily) (Tab) Gabapentin (Once-Daily) 600 MG Take 600 mg by mouth 3 times a day.        Ivabradine HCl (Tab) CORLANOR 5 MG Take 1 Tab by mouth 2 times a day, with meals.        Lactulose (Solution) lactulose 10 GM/15ML Take 10 g by mouth 2 times a day.        Levothyroxine Sodium (Tab) SYNTHROID 75 MCG Take 75 mcg by mouth Every morning on an empty stomach.        Liraglutide (Solution Pen-injector) VICTOZA 18 MG/3ML Inject 0.3 mL as instructed every day.        Melatonin (Tab) Melatonin 5 MG Take 10 mg by mouth every bedtime.        Mirabegron (TABLET SR 24 HR)  Mirabegron ER 25 MG Take 25 mg by mouth every day.        Nitroglycerin (SL Tab) NITROSTAT 0.4 MG Place 1 Tab under tongue as needed for Chest Pain.        non-formulary med non-formulary med  Inhale 2 L by mouth Continuous.        Nystatin (Powder) MYCOSTATIN  Apply 1 Application to affected area(s) 3 times a day.        Omeprazole (CAPSULE DELAYED RELEASE) PRILOSEC 20 MG Take 20 mg by mouth 2 times a day.        Oxycodone-Acetaminophen (Tab) PERCOCET-10  MG Take 2 Tabs by mouth every 6 hours.        Promethazine HCl (Tab) PHENERGAN 25 MG Take 1 Tab by mouth every 6 hours as needed for Nausea/Vomiting.        RaNITidine HCl (Tab) ZANTAC 150 MG Take 150 mg by mouth 2 times a day.        Sodium Bicarbonate (Tab) sodium bicarbonate 325 MG Take 2 Tabs by mouth 3 times a day.        Ziprasidone HCl (Cap) GEODON 80 MG Take 160 mg by mouth every evening. DO NOT GIVE PAST 1700        .                 Medicines prescribed today were sent to:     [id number] - [place], [place] - [address] [address]    Phone: [phone] Fax: [phone]    Open 24 Hours?: No      Medication refill instructions:       If your prescription bottle indicates you have medication refills left, it is not necessary to call your provider’s office. Please contact your pharmacy and they will refill your medication.    If your prescription bottle indicates you do not have any refills left, you may request refills at any time through one of the following ways: The online Where system (except Urgent Care), by calling your provider’s office, or by asking your pharmacy to contact your provider’s office with a refill request. Medication refills are processed only during regular business hours and may not be available until the next business day. Your provider may request additional information or to have a follow-up visit with you prior to refilling your medication.   *Please Note: Medication refills are  assigned a new Rx number when refilled electronically. Your pharmacy may indicate that no refills were authorized even though a new prescription for the same medication is available at the pharmacy. Please request the medicine by name with the pharmacy before contacting your provider for a refill.        Other Notes About Your Plan     DME:  [facility] / ph [phone] / fax [phone]              MyChart Access Code: Activation code not generated  Current MyChart Status: Active

## 2017-05-11 NOTE — PROGRESS NOTES
CC: Follow-up multiple issues    HPI:   Kritsin presents today with the following.    1. Uncontrolled type 2 diabetes mellitus without complication, without long-term current use of insulin (CMS-HCC)  Presents reporting taking Victoza at 1.2 dosing. She is having some mild nausea but no hypoglycemia. Recent steroid injection sugars have gone up to 200 but nothing in the 300s.    2. Bladder spasm  She is complaining of persistent bladder spasm as well as some hematuria with ambulation. She has contacted urology but is not her back. She is also going to a tertiary referral center next week. She denies fevers or chills in recent urinalysis showed esterase but did not grow bacteria and no sign of blood.    3. Nausea  She is complaining of some intermittent nausea for the last 3-4 days. She does not report it's related to Victoza. She denies any recurrent vomiting.      Patient Active Problem List    Diagnosis Date Noted   • Paraparesis of both lower limbs (CMS-HCC) 01/24/2017     Priority: High   • Paralysis (CMS-HCC) 10/27/2016     Priority: High   • Sinus tachycardia 10/31/2013     Priority: High   • Chronic inflammatory arthritis 05/23/2016     Priority: Medium   • Depression 10/28/2016     Priority: Low   • Schizophrenia (CMS-HCC) 10/27/2016     Priority: Low   • Psychosis, schizophrenia, simple ([facility]) 09/29/2016     Priority: Low     Class: Chronic   • TONYA on CPAP 03/07/2016     Priority: Low   • Acquired hypothyroidism 11/23/2015     Priority: Low   • PCOS (polycystic ovarian syndrome) 11/23/2015     Priority: Low   • Progressive focal motor weakness 06/28/2015     Priority: Low   • Fatty liver disease, nonalcoholic 01/19/2015     Priority: Low   • Anxiety 12/16/2014     Priority: Low   • Knee pain, right 02/13/2014     Priority: Low   • Neurogenic bladder 04/02/2011     Priority: Low   • Chronic UTI 09/18/2010     Priority: Low   • Multiple personality disorder 09/18/2010     Priority: Low   • Dysuria 05/09/2017    • Uncontrolled type 2 diabetes mellitus without complication, without long-term current use of insulin (CMS-HCC) 04/26/2017   • On home oxygen therapy 04/15/2017   • Chest pain 03/30/2017   • Weakness of right upper extremity 02/23/2017   • Leg weakness, bilateral 02/22/2017   • Chronic suprapubic catheter (CMS-HCC) 02/16/2017   • Weakness 01/22/2017   • Leg weakness 01/04/2017   • Hypovitaminosis D 11/29/2016   • HTN (hypertension) 11/01/2016   • Chronic pain syndrome 10/27/2016   • Bowel and bladder incontinence 10/27/2016   • Galactorrhea 07/22/2016   • Elevated sedimentation rate 06/27/2016   • Weakness of both lower extremities 06/22/2016   • Vitamin D deficiency 05/21/2016   • Morbidly obese (CMS-HCC) 03/07/2016   • Conversion disorder 03/07/2016   • Scoliosis 03/07/2016   • GERD (gastroesophageal reflux disease) 03/07/2016   • Peripheral neuropathy (CMS-HCC) 03/06/2016   • H/O prior ablation treatment 02/10/2016       Current Outpatient Prescriptions   Medication Sig Dispense Refill   • Mirabegron ER 25 MG TABLET SR 24 HR Take 25 mg by mouth every day. 30 Tab 6   • promethazine (PHENERGAN) 25 MG Tab Take 1 Tab by mouth every 6 hours as needed for Nausea/Vomiting. 30 Tab 6   • aspirin EC (ECOTRIN) 81 MG Tablet Delayed Response Take 1 Tab by mouth every day. 30 Tab 6   • Blood Glucose Monitoring Suppl SUPPLIES Misc Test strips order: Test strips for accucheck meter. Sig: use qday 50 Each 11   • liraglutide (VICTOZA) 18 MG/3ML Solution Pen-injector injection Inject 0.3 mL as instructed every day. 3 PEN 11   • baclofen (LIORESAL) 10 MG Tab Take 1 Tab by mouth 3 times a day. 90 Tab 6   • CRANBERRY PO Take 50,400 mg by mouth 2 times a day.     • non-formulary med Inhale 2 L by mouth Continuous.     • Gabapentin, Once-Daily, (GRALISE) 600 MG Tab Take 600 mg by mouth 3 times a day.     • ivabradine (CORLANOR) 5 MG Tab tablet Take 1 Tab by mouth 2 times a day, with meals. 60 Tab 6   • fluoxetine (PROZAC) 10 MG Cap  TAKE ONE CAPSULE BY MOUTH ONCE A DAY 30 Cap 2   • furosemide (LASIX) 20 MG Tab Take 20 mg by mouth every day.     • ranitidine (ZANTAC) 150 MG Tab Take 150 mg by mouth 2 times a day.     • oxycodone-acetaminophen (PERCOCET-10)  MG Tab Take 2 Tabs by mouth every 6 hours.     • nitroglycerin (NITROSTAT) 0.4 MG SL Tab Place 1 Tab under tongue as needed for Chest Pain. 25 Tab 1   • nystatin (MYCOSTATIN) Powder Apply 1 Application to affected area(s) 3 times a day. 1 Bottle 2   • Cranberry 1000 MG Cap Take 1 Cap by mouth 2 times a day, with meals. 60 Cap 3   • Melatonin 5 MG Tab Take 10 mg by mouth every bedtime.     • ziprasidone (GEODON) 80 MG Cap Take 160 mg by mouth every evening. DO NOT GIVE PAST 1700     • fentanyl (DURAGESIC) 25 MCG/HR PATCH 72 HR Apply 1 Patch to skin as directed every 72 hours.     • lactulose 10 GM/15ML Solution Take 10 g by mouth 2 times a day.     • vitamin D, Ergocalciferol, (DRISDOL) 20851 UNITS Cap capsule Take 50,000 Units by mouth every Friday.     • albuterol 108 (90 BASE) MCG/ACT Aero Soln inhalation aerosol Inhale 2 Puffs by mouth every 6 hours as needed for Shortness of Breath.     • cyanocobalamin (HM VITAMIN B12) 500 MCG tablet Take 1,000 mcg by mouth 2 times a day.     • sodium bicarbonate 325 MG Tab Take 2 Tabs by mouth 3 times a day.     • levothyroxine (SYNTHROID) 75 MCG Tab Take 75 mcg by mouth Every morning on an empty stomach.     • omeprazole (PRILOSEC) 20 MG delayed-release capsule Take 20 mg by mouth 2 times a day.       No current facility-administered medications for this visit.         Allergies as of 05/11/2017 - Joe as Reviewed 05/11/2017   Allergen Reaction Noted   • Cefdinir Shortness of Breath and Itching 03/01/2016   • Depakote [divalproex sodium] Unspecified 06/14/2010   • Abilify Unspecified 01/17/2013   • Amitriptyline Unspecified 10/31/2013   • Amoxicillin Rash 09/18/2010   • Ciprofloxacin Rash 12/17/2009   • Clindamycin Nausea 02/02/2011   •  "Doxycycline Vomiting and Nausea 08/15/2012   • Ees [erythromycin] Vomiting and Nausea 08/28/2010   • Flagyl [metronidazole hcl] Unspecified 03/31/2011   • Flomax [tamsulosin hydrochloride] Swelling 09/24/2009   • Metformin Unspecified 07/23/2013   • Sulfa drugs Hives and Rash 09/18/2010   • Tape Rash 08/15/2012   • Vancomycin Itching 07/10/2016   • Cephalexin [keflex] Rash 01/01/2017   • Erythromycin  03/30/2017   • Levofloxacin Unspecified 10/27/2016   • Metronidazole  03/30/2017   • Valproic acid  03/30/2017        ROS: As per HPI.    /76 mmHg  Pulse 73  Temp(Src) 36.6 °C (97.9 °F)  Resp 16  Ht 1.6 m (5' 2.99\")  Wt 111.131 kg (245 lb)  BMI 43.41 kg/m2  SpO2 98%  LMP 06/13/2016    Physical Exam:  Gen:         Alert and oriented, No apparent distress.  Neck:        No Lymphadenopathy or Bruits.  Lungs:     Clear to auscultation bilaterally  CV:          Regular rate and rhythm. No murmurs, rubs or gallops.               Ext:          No clubbing, cyanosis, edema.      Assessment and Plan.   26 y.o. female with the following issues.    1. Uncontrolled type 2 diabetes mellitus without complication, without long-term current use of insulin (CMS-HCC)  Holding Victoza for 3-4 days to see if it helps improve nausea.    2. Bladder spasm  Cannot take oxybutynin because of dehydration switching to [place]. She will follow up with urology.  - Mirabegron ER 25 MG TABLET SR 24 HR; Take 25 mg by mouth every day.  Dispense: 30 Tab; Refill: 6    3. Nausea  Continue nausea medications.        "

## 2017-05-12 ENCOUNTER — TELEPHONE (OUTPATIENT)
Dept: MEDICAL GROUP | Facility: MEDICAL CENTER | Age: 27
End: 2017-05-12

## 2017-05-12 LAB
BACTERIA UR CULT: ABNORMAL
BACTERIA UR CULT: ABNORMAL
SIGNIFICANT IND 70042: ABNORMAL
SITE SITE: ABNORMAL
SOURCE SOURCE: ABNORMAL

## 2017-05-12 NOTE — TELEPHONE ENCOUNTER
1. Caller Name: Kristin                                         Call Back Number: N/A      Patient approves a detailed voicemail message: N\A    FYI Only: Patient called to let you know she has decreased her victoza and can already tell a difference. Patient is less nauseated and has not throw up.

## 2017-05-12 NOTE — TELEPHONE ENCOUNTER
1. Caller Name: Bello  Patient grandparent                                        Call Back Number: [phone] (home)         Patient approves a detailed voicemail message: N\A    Vivienne called for the patient she stated that the patient was having bladder pain because she was diagnosed with a bladder infection and received antibiotics

## 2017-05-13 ENCOUNTER — APPOINTMENT (OUTPATIENT)
Dept: RADIOLOGY | Facility: MEDICAL CENTER | Age: 27
End: 2017-05-13
Attending: SURGERY
Payer: MEDICARE

## 2017-05-13 ENCOUNTER — HOSPITAL ENCOUNTER (OUTPATIENT)
Facility: MEDICAL CENTER | Age: 27
End: 2017-05-15
Attending: EMERGENCY MEDICINE | Admitting: HOSPITALIST
Payer: MEDICARE

## 2017-05-13 ENCOUNTER — APPOINTMENT (OUTPATIENT)
Dept: RADIOLOGY | Facility: MEDICAL CENTER | Age: 27
End: 2017-05-13
Attending: EMERGENCY MEDICINE
Payer: MEDICARE

## 2017-05-13 ENCOUNTER — RESOLUTE PROFESSIONAL BILLING HOSPITAL PROF FEE (OUTPATIENT)
Dept: HOSPITALIST | Facility: MEDICAL CENTER | Age: 27
End: 2017-05-13
Payer: MEDICARE

## 2017-05-13 DIAGNOSIS — A49.8 ENTEROCOCCUS FAECALIS INFECTION: ICD-10-CM

## 2017-05-13 DIAGNOSIS — R53.1 WEAKNESS: ICD-10-CM

## 2017-05-13 DIAGNOSIS — F25.9 SCHIZO AFFECTIVE SCHIZOPHRENIA (HCC): ICD-10-CM

## 2017-05-13 PROBLEM — N39.0 UTI (URINARY TRACT INFECTION): Status: ACTIVE | Noted: 2017-05-13
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
## 2017-05-13 LAB
ABO GROUP BLD: NORMAL
ABO GROUP BLD: NORMAL
ALBUMIN SERPL BCP-MCNC: 4.4 G/DL (ref 3.2–4.9)
ALBUMIN/GLOB SERPL: 1.7 G/DL
ALP SERPL-CCNC: 87 U/L (ref 30–99)
ALT SERPL-CCNC: 61 U/L (ref 2–50)
ANION GAP SERPL CALC-SCNC: 7 MMOL/L (ref 0–11.9)
APTT PPP: 29 SEC (ref 24.7–36)
AST SERPL-CCNC: 19 U/L (ref 12–45)
BILIRUB SERPL-MCNC: 0.5 MG/DL (ref 0.1–1.5)
BLD GP AB SCN SERPL QL: NORMAL
BUN SERPL-MCNC: 15 MG/DL (ref 8–22)
CALCIUM SERPL-MCNC: 10.2 MG/DL (ref 8.5–10.5)
CFT BLD TEG: 5.7 MIN (ref 5–10)
CHLORIDE SERPL-SCNC: 103 MMOL/L (ref 96–112)
CLOT ANGLE BLD TEG: 69.6 DEGREES (ref 53–72)
CLOT LYSIS 30M P MA LENFR BLD TEG: 1 % (ref 0–8)
CO2 SERPL-SCNC: 26 MMOL/L (ref 20–33)
CREAT SERPL-MCNC: 0.87 MG/DL (ref 0.5–1.4)
CT.EXTRINSIC BLD ROTEM: 1.4 MIN (ref 1–3)
ERYTHROCYTE [DISTWIDTH] IN BLOOD BY AUTOMATED COUNT: 41.8 FL (ref 35.9–50)
ETHANOL BLD-MCNC: 0 G/DL
GFR SERPL CREATININE-BSD FRML MDRD: >60 ML/MIN/1.73 M 2
GLOBULIN SER CALC-MCNC: 2.6 G/DL (ref 1.9–3.5)
GLUCOSE SERPL-MCNC: 117 MG/DL (ref 65–99)
HCG SERPL QL: NEGATIVE
HCT VFR BLD AUTO: 40.1 % (ref 37–47)
HGB BLD-MCNC: 13.5 G/DL (ref 12–16)
INR PPP: 0.97 (ref 0.87–1.13)
LACTATE BLD-SCNC: 2.4 MMOL/L (ref 0.5–2)
MCF BLD TEG: 70.4 MM (ref 50–70)
MCH RBC QN AUTO: 30.3 PG (ref 27–33)
MCHC RBC AUTO-ENTMCNC: 33.7 G/DL (ref 33.6–35)
MCV RBC AUTO: 90.1 FL (ref 81.4–97.8)
PA AA BLD-ACNC: 33.6 %
PA ADP BLD-ACNC: 4.8 %
PLATELET # BLD AUTO: 222 K/UL (ref 164–446)
PMV BLD AUTO: 10.8 FL (ref 9–12.9)
POTASSIUM SERPL-SCNC: 3.9 MMOL/L (ref 3.6–5.5)
PROT SERPL-MCNC: 7 G/DL (ref 6–8.2)
PROTHROMBIN TIME: 13.2 SEC (ref 12–14.6)
RBC # BLD AUTO: 4.45 M/UL (ref 4.2–5.4)
RH BLD: NORMAL
SODIUM SERPL-SCNC: 136 MMOL/L (ref 135–145)
TEG ALGORITHM TGALG: ABNORMAL
WBC # BLD AUTO: 6.4 K/UL (ref 4.8–10.8)

## 2017-05-13 PROCEDURE — 86900 BLOOD TYPING SEROLOGIC ABO: CPT

## 2017-05-13 PROCEDURE — 86901 BLOOD TYPING SEROLOGIC RH(D): CPT

## 2017-05-13 PROCEDURE — 85027 COMPLETE CBC AUTOMATED: CPT

## 2017-05-13 PROCEDURE — 72128 CT CHEST SPINE W/O DYE: CPT

## 2017-05-13 PROCEDURE — 85610 PROTHROMBIN TIME: CPT

## 2017-05-13 PROCEDURE — A9270 NON-COVERED ITEM OR SERVICE: HCPCS | Performed by: HOSPITALIST

## 2017-05-13 PROCEDURE — 72131 CT LUMBAR SPINE W/O DYE: CPT

## 2017-05-13 PROCEDURE — 700105 HCHG RX REV CODE 258: Performed by: HOSPITALIST

## 2017-05-13 PROCEDURE — 70450 CT HEAD/BRAIN W/O DYE: CPT

## 2017-05-13 PROCEDURE — 700102 HCHG RX REV CODE 250 W/ 637 OVERRIDE(OP): Performed by: HOSPITALIST

## 2017-05-13 PROCEDURE — 84703 CHORIONIC GONADOTROPIN ASSAY: CPT

## 2017-05-13 PROCEDURE — 80307 DRUG TEST PRSMV CHEM ANLYZR: CPT

## 2017-05-13 PROCEDURE — 700111 HCHG RX REV CODE 636 W/ 250 OVERRIDE (IP): Performed by: HOSPITALIST

## 2017-05-13 PROCEDURE — 72125 CT NECK SPINE W/O DYE: CPT

## 2017-05-13 PROCEDURE — 99220 PR INITIAL OBSERVATION CARE,LEVL III: CPT | Performed by: HOSPITALIST

## 2017-05-13 PROCEDURE — 80053 COMPREHEN METABOLIC PANEL: CPT

## 2017-05-13 PROCEDURE — 83605 ASSAY OF LACTIC ACID: CPT

## 2017-05-13 PROCEDURE — 305949 HCHG RED TRAUMA ACT PRE-NOTIFY NO CC

## 2017-05-13 PROCEDURE — 71260 CT THORAX DX C+: CPT

## 2017-05-13 PROCEDURE — 99285 EMERGENCY DEPT VISIT HI MDM: CPT

## 2017-05-13 PROCEDURE — 700117 HCHG RX CONTRAST REV CODE 255: Performed by: EMERGENCY MEDICINE

## 2017-05-13 PROCEDURE — 85384 FIBRINOGEN ACTIVITY: CPT

## 2017-05-13 PROCEDURE — G0378 HOSPITAL OBSERVATION PER HR: HCPCS

## 2017-05-13 PROCEDURE — 85347 COAGULATION TIME ACTIVATED: CPT

## 2017-05-13 PROCEDURE — 85576 BLOOD PLATELET AGGREGATION: CPT

## 2017-05-13 PROCEDURE — 85730 THROMBOPLASTIN TIME PARTIAL: CPT

## 2017-05-13 PROCEDURE — 86850 RBC ANTIBODY SCREEN: CPT

## 2017-05-13 RX ORDER — PROMETHAZINE HYDROCHLORIDE 25 MG/1
12.5-25 TABLET ORAL EVERY 4 HOURS PRN
Status: DISCONTINUED | OUTPATIENT
Start: 2017-05-13 | End: 2017-05-15 | Stop reason: HOSPADM

## 2017-05-13 RX ORDER — LACTULOSE 10 G/15ML
10 SOLUTION ORAL 2 TIMES DAILY PRN
COMMUNITY
End: 2018-07-14

## 2017-05-13 RX ORDER — FLUOXETINE 10 MG/1
10 CAPSULE ORAL DAILY
COMMUNITY
End: 2017-07-06

## 2017-05-13 RX ORDER — ZIPRASIDONE HYDROCHLORIDE 80 MG/1
160 CAPSULE ORAL EVERY EVENING
COMMUNITY
End: 2017-07-06

## 2017-05-13 RX ORDER — OMEPRAZOLE 20 MG/1
20 CAPSULE, DELAYED RELEASE ORAL 2 TIMES DAILY
Status: DISCONTINUED | OUTPATIENT
Start: 2017-05-13 | End: 2017-05-15 | Stop reason: HOSPADM

## 2017-05-13 RX ORDER — LEVOTHYROXINE SODIUM 0.07 MG/1
75 TABLET ORAL
COMMUNITY
End: 2018-01-29

## 2017-05-13 RX ORDER — RANITIDINE 150 MG/1
150 TABLET ORAL 2 TIMES DAILY
COMMUNITY
End: 2017-07-06

## 2017-05-13 RX ORDER — SODIUM BICARBONATE 650 MG/1
650 TABLET ORAL 3 TIMES DAILY
Status: DISCONTINUED | OUTPATIENT
Start: 2017-05-13 | End: 2017-05-15 | Stop reason: HOSPADM

## 2017-05-13 RX ORDER — SODIUM CHLORIDE 9 MG/ML
INJECTION, SOLUTION INTRAVENOUS CONTINUOUS
Status: DISCONTINUED | OUTPATIENT
Start: 2017-05-13 | End: 2017-05-15 | Stop reason: HOSPADM

## 2017-05-13 RX ORDER — GABAPENTIN 300 MG/1
600 CAPSULE ORAL 3 TIMES DAILY
Status: DISCONTINUED | OUTPATIENT
Start: 2017-05-13 | End: 2017-05-15 | Stop reason: HOSPADM

## 2017-05-13 RX ORDER — ASPIRIN 81 MG/1
81 TABLET, CHEWABLE ORAL DAILY
Status: DISCONTINUED | OUTPATIENT
Start: 2017-05-14 | End: 2017-05-15 | Stop reason: HOSPADM

## 2017-05-13 RX ORDER — LEVOTHYROXINE SODIUM 0.07 MG/1
75 TABLET ORAL
Status: DISCONTINUED | OUTPATIENT
Start: 2017-05-14 | End: 2017-05-15 | Stop reason: HOSPADM

## 2017-05-13 RX ORDER — CHOLECALCIFEROL (VITAMIN D3) 125 MCG
10 CAPSULE ORAL
COMMUNITY
End: 2017-07-06

## 2017-05-13 RX ORDER — PROMETHAZINE HYDROCHLORIDE 25 MG/1
25 TABLET ORAL DAILY
COMMUNITY
End: 2017-07-06

## 2017-05-13 RX ORDER — GLUCOSAMINE HCL 500 MG
2 TABLET ORAL DAILY
COMMUNITY
End: 2017-08-29

## 2017-05-13 RX ORDER — BISACODYL 10 MG
10 SUPPOSITORY, RECTAL RECTAL
Status: DISCONTINUED | OUTPATIENT
Start: 2017-05-13 | End: 2017-05-15

## 2017-05-13 RX ORDER — OMEPRAZOLE 20 MG/1
20 CAPSULE, DELAYED RELEASE ORAL 2 TIMES DAILY
COMMUNITY
End: 2017-07-07

## 2017-05-13 RX ORDER — ZIPRASIDONE HYDROCHLORIDE 40 MG/1
160 CAPSULE ORAL EVERY EVENING
Status: DISCONTINUED | OUTPATIENT
Start: 2017-05-13 | End: 2017-05-15 | Stop reason: HOSPADM

## 2017-05-13 RX ORDER — OXYCODONE HYDROCHLORIDE 10 MG/1
10 TABLET ORAL EVERY 4 HOURS PRN
Status: DISCONTINUED | OUTPATIENT
Start: 2017-05-13 | End: 2017-05-14

## 2017-05-13 RX ORDER — ALBUTEROL SULFATE 90 UG/1
2 AEROSOL, METERED RESPIRATORY (INHALATION)
Status: DISCONTINUED | OUTPATIENT
Start: 2017-05-13 | End: 2017-05-15 | Stop reason: HOSPADM

## 2017-05-13 RX ORDER — ALBUTEROL SULFATE 90 UG/1
2 AEROSOL, METERED RESPIRATORY (INHALATION) EVERY 6 HOURS PRN
COMMUNITY
End: 2017-07-25

## 2017-05-13 RX ORDER — GABAPENTIN 300 MG/1
600 CAPSULE ORAL 3 TIMES DAILY
COMMUNITY
End: 2017-07-25

## 2017-05-13 RX ORDER — ASPIRIN 81 MG/1
81 TABLET, CHEWABLE ORAL DAILY
COMMUNITY
End: 2017-07-06

## 2017-05-13 RX ORDER — OXYCODONE AND ACETAMINOPHEN 10; 325 MG/1; MG/1
2 TABLET ORAL EVERY 6 HOURS
Status: ON HOLD | COMMUNITY
End: 2017-05-14

## 2017-05-13 RX ORDER — ACETAMINOPHEN 325 MG/1
650 TABLET ORAL EVERY 6 HOURS PRN
Status: DISCONTINUED | OUTPATIENT
Start: 2017-05-13 | End: 2017-05-14

## 2017-05-13 RX ORDER — ONDANSETRON 2 MG/ML
4 INJECTION INTRAMUSCULAR; INTRAVENOUS EVERY 4 HOURS PRN
Status: DISCONTINUED | OUTPATIENT
Start: 2017-05-13 | End: 2017-05-15 | Stop reason: HOSPADM

## 2017-05-13 RX ORDER — FLUOXETINE 10 MG/1
10 CAPSULE ORAL DAILY
Status: DISCONTINUED | OUTPATIENT
Start: 2017-05-14 | End: 2017-05-15 | Stop reason: HOSPADM

## 2017-05-13 RX ORDER — ONDANSETRON 4 MG/1
4 TABLET, ORALLY DISINTEGRATING ORAL EVERY 4 HOURS PRN
Status: DISCONTINUED | OUTPATIENT
Start: 2017-05-13 | End: 2017-05-15 | Stop reason: HOSPADM

## 2017-05-13 RX ORDER — FUROSEMIDE 40 MG/1
40 TABLET ORAL DAILY
COMMUNITY
End: 2018-01-05 | Stop reason: DRUGHIGH

## 2017-05-13 RX ORDER — SODIUM BICARBONATE 325 MG/1
650 TABLET ORAL 3 TIMES DAILY
COMMUNITY
End: 2017-07-06

## 2017-05-13 RX ORDER — NITROFURANTOIN 25; 75 MG/1; MG/1
100 CAPSULE ORAL 2 TIMES DAILY WITH MEALS
Status: DISCONTINUED | OUTPATIENT
Start: 2017-05-13 | End: 2017-05-13

## 2017-05-13 RX ORDER — RANITIDINE 150 MG/1
150 TABLET ORAL 2 TIMES DAILY
Status: DISCONTINUED | OUTPATIENT
Start: 2017-05-13 | End: 2017-05-13

## 2017-05-13 RX ORDER — CHOLECALCIFEROL (VITAMIN D3) 125 MCG
1000 CAPSULE ORAL DAILY
COMMUNITY
End: 2017-07-07

## 2017-05-13 RX ORDER — POLYETHYLENE GLYCOL 3350 17 G/17G
1 POWDER, FOR SOLUTION ORAL
Status: DISCONTINUED | OUTPATIENT
Start: 2017-05-13 | End: 2017-05-15

## 2017-05-13 RX ORDER — PROMETHAZINE HYDROCHLORIDE 25 MG/1
25 TABLET ORAL DAILY
Status: DISCONTINUED | OUTPATIENT
Start: 2017-05-14 | End: 2017-05-15 | Stop reason: HOSPADM

## 2017-05-13 RX ORDER — HEPARIN SODIUM 5000 [USP'U]/ML
5000 INJECTION, SOLUTION INTRAVENOUS; SUBCUTANEOUS EVERY 8 HOURS
Status: DISCONTINUED | OUTPATIENT
Start: 2017-05-13 | End: 2017-05-15 | Stop reason: HOSPADM

## 2017-05-13 RX ORDER — NITROFURANTOIN 25; 75 MG/1; MG/1
100 CAPSULE ORAL 2 TIMES DAILY WITH MEALS
Status: DISCONTINUED | OUTPATIENT
Start: 2017-05-13 | End: 2017-05-15 | Stop reason: HOSPADM

## 2017-05-13 RX ORDER — OXYCODONE HYDROCHLORIDE 5 MG/1
5 TABLET ORAL EVERY 4 HOURS PRN
Status: DISCONTINUED | OUTPATIENT
Start: 2017-05-13 | End: 2017-05-14

## 2017-05-13 RX ORDER — PROMETHAZINE HYDROCHLORIDE 25 MG/1
12.5-25 SUPPOSITORY RECTAL EVERY 4 HOURS PRN
Status: DISCONTINUED | OUTPATIENT
Start: 2017-05-13 | End: 2017-05-15 | Stop reason: HOSPADM

## 2017-05-13 RX ORDER — AMOXICILLIN 250 MG
2 CAPSULE ORAL 2 TIMES DAILY
Status: DISCONTINUED | OUTPATIENT
Start: 2017-05-13 | End: 2017-05-14

## 2017-05-13 RX ADMIN — OMEPRAZOLE 20 MG: 20 CAPSULE, DELAYED RELEASE ORAL at 21:31

## 2017-05-13 RX ADMIN — OXYCODONE HYDROCHLORIDE 10 MG: 10 TABLET ORAL at 21:29

## 2017-05-13 RX ADMIN — STANDARDIZED SENNA CONCENTRATE AND DOCUSATE SODIUM 2 TABLET: 8.6; 5 TABLET, FILM COATED ORAL at 21:31

## 2017-05-13 RX ADMIN — IOHEXOL 100 ML: 350 INJECTION, SOLUTION INTRAVENOUS at 15:29

## 2017-05-13 RX ADMIN — HEPARIN SODIUM 5000 UNITS: 5000 INJECTION, SOLUTION INTRAVENOUS; SUBCUTANEOUS at 21:31

## 2017-05-13 RX ADMIN — SODIUM CHLORIDE: 9 INJECTION, SOLUTION INTRAVENOUS at 21:49

## 2017-05-13 RX ADMIN — OXYCODONE HYDROCHLORIDE 10 MG: 10 TABLET ORAL at 21:32

## 2017-05-13 ASSESSMENT — PATIENT HEALTH QUESTIONNAIRE - PHQ9
SUM OF ALL RESPONSES TO PHQ9 QUESTIONS 1 AND 2: 0
2. FEELING DOWN, DEPRESSED, IRRITABLE, OR HOPELESS: NOT AT ALL
1. LITTLE INTEREST OR PLEASURE IN DOING THINGS: NOT AT ALL
SUM OF ALL RESPONSES TO PHQ QUESTIONS 1-9: 0

## 2017-05-13 ASSESSMENT — PAIN SCALES - GENERAL
PAINLEVEL_OUTOF10: 8
PAINLEVEL_OUTOF10: 10
PAINLEVEL_OUTOF10: 5

## 2017-05-13 ASSESSMENT — LIFESTYLE VARIABLES
EVER_SMOKED: NEVER
ALCOHOL_USE: NO

## 2017-05-13 NOTE — ED NOTES
Patient usually uses walker to walk, patient doesn't remember how she got to bottom of 11 stairs. Patient has an intestine stimulator and chronic pain. Patient arrives with chronic indwelling suprapubic catheter, states its for incontinence.

## 2017-05-13 NOTE — IP AVS SNAPSHOT
" <p align=\"LEFT\"><IMG SRC=\"//[email or web address]\" alt=\"Image\" WIDTH=\"50%\" HEIGHT=\"200\" BORDER=\"\"></p>                   Name:Yessica Weaver  Medical Record Number:[id number]  CSN: [id number]    YOB: 1990   Age: 26 y.o.  Sex: female  HT:1.651 m (5' 5\") WT: 119.1 kg (262 lb 9.1 oz)          Admit Date: 5/13/2017     Discharge Date:   Today's Date: 5/15/2017  Attending Doctor:  Evan Herrera M.D.                  Allergies:  Review of patient's allergies indicates not on file.          Follow-up Information     1. Follow up with Torres Brody M.D.. Go on 5/23/2017.    Specialty:  Internal Medicine    Why:  Please arrive at 8:05 am for a 8:20 am appointment. Thank you.    Contact information    [address]  [address] [address] [phone]           Medication List      Take these Medications        Instructions    albuterol 108 (90 BASE) MCG/ACT Aers inhalation aerosol    Inhale 2 Puffs by mouth every 6 hours as needed for Shortness of Breath.   Dose:  2 Puff       aspirin 81 MG Chew chewable tablet   Commonly known as:  ASA    Take 81 mg by mouth every day.   Dose:  81 mg       CORLANOR 5 MG Tabs tablet   Generic drug:  ivabradine    Take 5 mg by mouth 2 times a day, with meals.   Dose:  5 mg       Cranberry 400 MG Tabs    Take 2 Tabs by mouth every day.   Dose:  2 Tab       cyanocobalamin 500 MCG Tabs   Commonly known as:  VITAMIN B-12    Take 1,000 mcg by mouth every day.   Dose:  1000 mcg       fluoxetine 10 MG Caps   Commonly known as:  PROZAC    Take 10 mg by mouth every day.   Dose:  10 mg       furosemide 40 MG Tabs   Commonly known as:  LASIX    Take 40 mg by mouth every day.   Dose:  40 mg       gabapentin 300 MG Caps   Commonly known as:  NEURONTIN    Take 600 mg by mouth 3 times a day.   Dose:  600 mg       lactobacillus granules Pack    Take 1 Packet by mouth 3 times a day, with meals.   Dose:  1 Packet       lactulose 10 GM/15ML Soln    Take 10 g by mouth 2 times a day as " needed.   Dose:  10 g       levothyroxine 75 MCG Tabs   Commonly known as:  SYNTHROID    Take 75 mcg by mouth Every morning on an empty stomach.   Dose:  75 mcg       liraglutide 18 MG/3ML Sopn injection   Commonly known as:  VICTOZA    Inject 1.2 mg as instructed every day.   Dose:  1.2 mg       Melatonin 5 MG Tabs    Take 10 mg by mouth every bedtime.   Dose:  10 mg       nitrofurantoin monohydr macro 100 MG Caps   Commonly known as:  MACROBID    Take 1 Cap by mouth 2 times a day, with meals.   Dose:  100 mg       omeprazole 20 MG delayed-release capsule   Commonly known as:  PRILOSEC    Take 20 mg by mouth 2 times a day.   Dose:  20 mg       oxycodone-acetaminophen  MG Tabs   What changed:    - how much to take  - when to take this  - reasons to take this   Commonly known as:  PERCOCET-10    Take 1-2 Tabs by mouth every 6 hours as needed for Severe Pain. Scheduled.   Dose:  1-2 Tab       promethazine 25 MG Tabs   Commonly known as:  PHENERGAN    Take 25 mg by mouth every day.   Dose:  25 mg       ranitidine 150 MG Tabs   Commonly known as:  ZANTAC    Take 150 mg by mouth 2 times a day.   Dose:  150 mg       sodium bicarbonate 325 MG Tabs    Take 650 mg by mouth 3 times a day.   Dose:  650 mg       ziprasidone 80 MG Caps   Commonly known as:  GEODON    Take 160 mg by mouth every evening.   Dose:  160 mg

## 2017-05-13 NOTE — IP AVS SNAPSHOT
5/15/2017    Yessica Weaver  [address] [place] [address]    Dear Yessica:    [facility] wants to ensure your discharge home is safe and you or your loved ones have had all of your questions answered regarding your care after you leave the hospital.    Below is a list of resources and contact information should you have any questions regarding your hospital stay, follow-up instructions, or active medical symptoms.    Questions or Concerns Regarding… Contact   Medical Questions Related to Your Discharge  (7 days a week, 8am-5pm) Contact a Nurse Care Coordinator   [phone]   Medical Questions Not Related to Your Discharge  (24 hours a day / 7 days a week)  Contact the Nurse Health Line   [phone]    Medications or Discharge Instructions Refer to your discharge packet   or contact your [facility] Primary Care Provider   [phone]   Follow-up Appointment(s) Schedule your appointment via [employer]   or contact Scheduling [phone]   Billing Review your statement via [employer]  or contact Billing [phone]   Medical Records Review your records via [employer]   or contact Medical Records [phone]     You may receive a telephone call within two days of discharge. This call is to make certain you understand your discharge instructions and have the opportunity to have any questions answered. You can also easily access your medical information, test results and upcoming appointments via the [employer] free online health management tool. You can learn more and sign up at [email or web address]. For assistance setting up your [employer] account, please call [phone].    Once again, we want to ensure your discharge home is safe and that you have a clear understanding of any next steps in your care. If you have any questions or concerns, please do not hesitate to contact us, we are here for you. Thank you for choosing [facility] for your healthcare needs.    Sincerely,    Your [facility] Healthcare Team

## 2017-05-13 NOTE — IP AVS SNAPSHOT
" Home Care Instructions                                                                                                                  Name:Yessica Weaver  Medical Record Number:[id number]  CSN: [id number]    YOB: 1990   Age: 26 y.o.  Sex: female  HT:1.651 m (5' 5\") WT: 119.1 kg (262 lb 9.1 oz)          Admit Date: 5/13/2017     Discharge Date:   Today's Date: 5/15/2017  Attending Doctor:  Evan Herrera M.D.                  Allergies:  Review of patient's allergies indicates not on file.            Discharge Instructions       Discharge Instructions    Discharged to home by car with relative. Discharged via wheelchair, hospital escort: Yes.  Special equipment needed: Not Applicable    Be sure to schedule a follow-up appointment with your primary care doctor or any specialists as instructed.     Discharge Plan:   Influenza Vaccine Indication: Not indicated: Previously immunized this influenza season and > 8 years of age    I understand that a diet low in cholesterol, fat, and sodium is recommended for good health. Unless I have been given specific instructions below for another diet, I accept this instruction as my diet prescription.   Other diet: DM    Special Instructions: None    · Is patient discharged on Warfarin / Coumadin?   No     · Is patient Post Blood Transfusion?  No    Depression / Suicide Risk    As you are discharged from this [facility] facility, it is important to learn how to keep safe from harming yourself.    Recognize the warning signs:  · Abrupt changes in personality, positive or negative- including increase in energy   · Giving away possessions  · Change in eating patterns- significant weight changes-  positive or negative  · Change in sleeping patterns- unable to sleep or sleeping all the time   · Unwillingness or inability to communicate  · Depression  · Unusual sadness, discouragement and loneliness  · Talk of wanting to die  · Neglect of personal appearance   · Rebelliousness- " reckless behavior  · Withdrawal from people/activities they love  · Confusion- inability to concentrate     If you or a loved one observes any of these behaviors or has concerns about self-harm, here's what you can do:  · Talk about it- your feelings and reasons for harming yourself  · Remove any means that you might use to hurt yourself (examples: pills, rope, extension cords, firearm)  · Get professional help from the community (Mental Health, Substance Abuse, psychological counseling)  · Do not be alone:Call your Safe Contact- someone whom you trust who will be there for you.  · Call your local CRISIS HOTLINE 345-2526 or 508-719-0908  · Call your local Children's Mobile Crisis Response Team [place] (321) 434-2102 or www.[employer]  · Call the toll free National Suicide Prevention Hotlines   · National Suicide Prevention Lifeline 018-500-FCMZ (2585)  · [place] Line Network 800-WDOVGBA (105-2927)        Follow-up Information     1. Follow up with Torres Brody M.D.. Go on 5/23/2017.    Specialty:  Internal Medicine    Why:  Please arrive at 8:05 am for a 8:20 am appointment. Thank you.    Contact information    [address] [address] [phone]           Discharge Medication Instructions:    Below are the medications your physician expects you to take upon discharge:    Review all your home medications and newly ordered medications with your doctor and/or pharmacist. Follow medication instructions as directed by your doctor and/or pharmacist.    Please keep your medication list with you and share with your physician.               Medication List      START taking these medications        Instructions    Morning Afternoon Evening Bedtime    lactobacillus granules Pack   Last time this was given:  1 Packet on 5/15/2017  8:34 AM        Take 1 Packet by mouth 3 times a day, with meals.   Dose:  1 Packet                        nitrofurantoin monohydr macro 100 MG Caps   Last  time this was given:  100 mg on 5/15/2017  9:53 AM   Commonly known as:  MACROBID        Take 1 Cap by mouth 2 times a day, with meals.   Dose:  100 mg                          CHANGE how you take these medications        Instructions    Morning Afternoon Evening Bedtime    oxycodone-acetaminophen  MG Tabs   What changed:    - how much to take  - when to take this  - reasons to take this   Commonly known as:  PERCOCET-10        Take 1-2 Tabs by mouth every 6 hours as needed for Severe Pain. Scheduled.   Dose:  1-2 Tab                          CONTINUE taking these medications        Instructions    Morning Afternoon Evening Bedtime    albuterol 108 (90 BASE) MCG/ACT Aers inhalation aerosol        Inhale 2 Puffs by mouth every 6 hours as needed for Shortness of Breath.   Dose:  2 Puff                        aspirin 81 MG Chew chewable tablet   Last time this was given:  81 mg on 5/15/2017  8:34 AM   Commonly known as:  ASA        Take 81 mg by mouth every day.   Dose:  81 mg                        CORLANOR 5 MG Tabs tablet   Last time this was given:  5 mg on 5/15/2017  8:37 AM   Generic drug:  ivabradine        Take 5 mg by mouth 2 times a day, with meals.   Dose:  5 mg                        Cranberry 400 MG Tabs        Take 2 Tabs by mouth every day.   Dose:  2 Tab                        cyanocobalamin 500 MCG Tabs   Commonly known as:  VITAMIN B-12        Take 1,000 mcg by mouth every day.   Dose:  1000 mcg                        fluoxetine 10 MG Caps   Last time this was given:  10 mg on 5/15/2017  8:34 AM   Commonly known as:  PROZAC        Take 10 mg by mouth every day.   Dose:  10 mg                        furosemide 40 MG Tabs   Commonly known as:  LASIX        Take 40 mg by mouth every day.   Dose:  40 mg                        gabapentin 300 MG Caps   Last time this was given:  600 mg on 5/15/2017  8:34 AM   Commonly known as:  NEURONTIN        Take 600 mg by mouth 3 times a day.   Dose:  600 mg                         lactulose 10 GM/15ML Soln        Take 10 g by mouth 2 times a day as needed.   Dose:  10 g                        levothyroxine 75 MCG Tabs   Last time this was given:  75 mcg on 5/15/2017  6:02 AM   Commonly known as:  SYNTHROID        Take 75 mcg by mouth Every morning on an empty stomach.   Dose:  75 mcg                        liraglutide 18 MG/3ML Sopn injection   Last time this was given:  1.2 mg on 5/15/2017  9:00 AM   Commonly known as:  VICTOZA        Inject 1.2 mg as instructed every day.   Dose:  1.2 mg                        Melatonin 5 MG Tabs        Take 10 mg by mouth every bedtime.   Dose:  10 mg                        omeprazole 20 MG delayed-release capsule   Last time this was given:  20 mg on 5/15/2017  8:34 AM   Commonly known as:  PRILOSEC        Take 20 mg by mouth 2 times a day.   Dose:  20 mg                        promethazine 25 MG Tabs   Last time this was given:  25 mg on 5/15/2017  8:34 AM   Commonly known as:  PHENERGAN        Take 25 mg by mouth every day.   Dose:  25 mg                        ranitidine 150 MG Tabs   Commonly known as:  ZANTAC        Take 150 mg by mouth 2 times a day.   Dose:  150 mg                        sodium bicarbonate 325 MG Tabs   Last time this was given:  650 mg on 5/15/2017  8:40 AM        Take 650 mg by mouth 3 times a day.   Dose:  650 mg                        ziprasidone 80 MG Caps   Last time this was given:  160 mg on 5/14/2017  7:15 PM   Commonly known as:  GEODON        Take 160 mg by mouth every evening.   Dose:  160 mg                             Where to Get Your Medications      Information about where to get these medications is not yet available     ! Ask your nurse or doctor about these medications    - nitrofurantoin monohydr macro 100 MG Caps  - oxycodone-acetaminophen  MG Tabs            Instructions           Diet / Nutrition:    Follow any diet instructions given to you by your doctor or the dietician, including  how much salt (sodium) you are allowed each day.    If you are overweight, talk to your doctor about a weight reduction plan.    Activity:    Remain physically active following your doctor's instructions about exercise and activity.    Rest often.     Any time you become even a little tired or short of breath, SIT DOWN and rest.    Worsening Symptoms:    Report any of the following signs and symptoms to the doctor's office immediately:    *Pain of jaw, arm, or neck  *Chest pain not relieved by medication                               *Dizziness or loss of consciousness  *Difficulty breathing even when at rest   *More tired than usual                                       *Bleeding drainage or swelling of surgical site  *Swelling of feet, ankles, legs or stomach                 *Fever (>100ºF)  *Pink or blood tinged sputum  *Weight gain (3lbs/day or 5lbs /week)           *Shock from internal defibrillator (if applicable)  *Palpitations or irregular heartbeats                *Cool and/or numb extremities    Stroke Awareness    Common Risk Factors for Stroke include:    Age  Atrial Fibrillation  Carotid Artery Stenosis  Diabetes Mellitus  Excessive alcohol consumption  High blood pressure  Overweight   Physical inactivity  Smoking    Warning signs and symptoms of a stroke include:    *Sudden numbness or weakness of the face, arm or leg (especially on one side of the body).  *Sudden confusion, trouble speaking or understanding.  *Sudden trouble seeing in one or both eyes.  *Sudden trouble walking, dizziness, loss of balance or coordination.Sudden severe headache with no known cause.    It is very important to get treatment quickly when a stroke occurs. If you experience any of the above warning signs, call 911 immediately.                   Disclaimer         Quit Smoking / Tobacco Use:    I understand the use of any tobacco products increases my chance of suffering from future heart disease or stroke and could cause  other illnesses which may shorten my life. Quitting the use of tobacco products is the single most important thing I can do to improve my health. For further information on smoking / tobacco cessation call a Toll Free Quit Line at 1-483.605.2208 (*National Cancer [place]) or 1-551.424.4575 (American Lung Association) or you can access the web based program at www.lungusa.org.    [place] Tobacco Users Help Line:  (259) 989-5327       Toll Free: 1-421.785.9235  Quit Tobacco Program [facility] (135)356-7244    Crisis Hotline:    [place] Crisis Hotline:  7-440-NJMJNQX or 1-137.848.8999    [place] Crisis Hotline:    1-938.503.4549 or 271-265-9221    Discharge Survey:   Thank you for choosing [facility]. We hope we did everything we could to make your hospital stay a pleasant one. You may be receiving a phone survey and we would appreciate your time and participation in answering the questions. Your input is very valuable to us in our efforts to improve our service to our patients and their families.        My signature on this form indicates that:    1. I have reviewed and understand the above information.  2. My questions regarding this information have been answered to my satisfaction.  3. I have formulated a plan with my discharge nurse to obtain my prescribed medications for home.                  Disclaimer         __________________________________                     __________       ________                       Patient Signature                                                 Date                    Time

## 2017-05-13 NOTE — IP AVS SNAPSHOT
InSite Vision Access Code: [email or web address]  Expires: 6/14/2017 10:51 AM    InSite Vision  A secure, online tool to manage your health information     [employer]’s InSite Vision® is a secure, online tool that connects you to your personalized health information from the privacy of your home -- day or night - making it very easy for you to manage your healthcare. Once the activation process is completed, you can even access your medical information using the InSite Vision rocio, which is available for free in the Apple Rocio store or Google Play store.     InSite Vision provides the following levels of access (as shown below):   My Chart Features   [facility] Primary Care Doctor [facility]  Specialists [facility]  Urgent  Care Non-[facility]  Primary Care  Doctor   Email your healthcare team securely and privately 24/7 X X X X   Manage appointments: schedule your next appointment; view details of past/upcoming appointments X      Request prescription refills. X      View recent personal medical records, including lab and immunizations X X X X   View health record, including health history, allergies, medications X X X X   Read reports about your outpatient visits, procedures, consult and ER notes X X X X   See your discharge summary, which is a recap of your hospital and/or ER visit that includes your diagnosis, lab results, and care plan. X X       How to register for InSite Vision:  1. Go to  [email or web address].  2. Click on the Sign Up Now box, which takes you to the New Member Sign Up page. You will need to provide the following information:  a. Enter your InSite Vision Access Code exactly as it appears at the top of this page. (You will not need to use this code after you’ve completed the sign-up process. If you do not sign up before the expiration date, you must request a new code.)   b. Enter your date of birth.   c. Enter your home email address.   d. Click Submit, and follow the next screen’s instructions.  3. Create a InSite Vision ID. This will be your InSite Vision  login ID and cannot be changed, so think of one that is secure and easy to remember.  4. Create a [employer] password. You can change your password at any time.  5. Enter your Password Reset Question and Answer. This can be used at a later time if you forget your password.   6. Enter your e-mail address. This allows you to receive e-mail notifications when new information is available in [employer].  7. Click Sign Up. You can now view your health information.    For assistance activating your [employer] account, call [phone]

## 2017-05-13 NOTE — ED NOTES
Logrolled to side and exam by trauma surgeon.  Backboard removed.  No change in neuro status.  Still unable to feel touch from groin down on each side, nor is she able to move her lower extremities.

## 2017-05-13 NOTE — ED NOTES
Per patient's grandmother on scene, patient's PCP has drawn a specimen from patient's suprapubic catheter and is prescribing antibiotic for a UTI.

## 2017-05-13 NOTE — ED NOTES
C-Collar apparently removed by MD per pt report.  She continues to c/o low back pain and is requesting pain medication.

## 2017-05-14 ENCOUNTER — PATIENT OUTREACH (OUTPATIENT)
Dept: HEALTH INFORMATION MANAGEMENT | Facility: OTHER | Age: 27
End: 2017-05-14

## 2017-05-14 LAB
ANION GAP SERPL CALC-SCNC: 7 MMOL/L (ref 0–11.9)
BASOPHILS # BLD AUTO: 0.4 % (ref 0–1.8)
BASOPHILS # BLD: 0.02 K/UL (ref 0–0.12)
BUN SERPL-MCNC: 11 MG/DL (ref 8–22)
CALCIUM SERPL-MCNC: 9.6 MG/DL (ref 8.5–10.5)
CHLORIDE SERPL-SCNC: 106 MMOL/L (ref 96–112)
CO2 SERPL-SCNC: 25 MMOL/L (ref 20–33)
CREAT SERPL-MCNC: 0.77 MG/DL (ref 0.5–1.4)
EOSINOPHIL # BLD AUTO: 0.24 K/UL (ref 0–0.51)
EOSINOPHIL NFR BLD: 4.5 % (ref 0–6.9)
ERYTHROCYTE [DISTWIDTH] IN BLOOD BY AUTOMATED COUNT: 42 FL (ref 35.9–50)
GFR SERPL CREATININE-BSD FRML MDRD: >60 ML/MIN/1.73 M 2
GLUCOSE BLD-MCNC: 100 MG/DL (ref 65–99)
GLUCOSE BLD-MCNC: 89 MG/DL (ref 65–99)
GLUCOSE BLD-MCNC: 94 MG/DL (ref 65–99)
GLUCOSE SERPL-MCNC: 106 MG/DL (ref 65–99)
HCT VFR BLD AUTO: 37.4 % (ref 37–47)
HGB BLD-MCNC: 12.5 G/DL (ref 12–16)
IMM GRANULOCYTES # BLD AUTO: 0.03 K/UL (ref 0–0.11)
IMM GRANULOCYTES NFR BLD AUTO: 0.6 % (ref 0–0.9)
LYMPHOCYTES # BLD AUTO: 2.19 K/UL (ref 1–4.8)
LYMPHOCYTES NFR BLD: 40.9 % (ref 22–41)
MCH RBC QN AUTO: 30.6 PG (ref 27–33)
MCHC RBC AUTO-ENTMCNC: 33.4 G/DL (ref 33.6–35)
MCV RBC AUTO: 91.7 FL (ref 81.4–97.8)
MONOCYTES # BLD AUTO: 0.41 K/UL (ref 0–0.85)
MONOCYTES NFR BLD AUTO: 7.6 % (ref 0–13.4)
NEUTROPHILS # BLD AUTO: 2.47 K/UL (ref 2–7.15)
NEUTROPHILS NFR BLD: 46 % (ref 44–72)
NRBC # BLD AUTO: 0 K/UL
NRBC BLD AUTO-RTO: 0 /100 WBC
PLATELET # BLD AUTO: 199 K/UL (ref 164–446)
PMV BLD AUTO: 10.9 FL (ref 9–12.9)
POTASSIUM SERPL-SCNC: 4.2 MMOL/L (ref 3.6–5.5)
RBC # BLD AUTO: 4.08 M/UL (ref 4.2–5.4)
SODIUM SERPL-SCNC: 138 MMOL/L (ref 135–145)
WBC # BLD AUTO: 5.4 K/UL (ref 4.8–10.8)

## 2017-05-14 PROCEDURE — 85025 COMPLETE CBC W/AUTO DIFF WBC: CPT

## 2017-05-14 PROCEDURE — 82962 GLUCOSE BLOOD TEST: CPT

## 2017-05-14 PROCEDURE — 700111 HCHG RX REV CODE 636 W/ 250 OVERRIDE (IP): Performed by: HOSPITALIST

## 2017-05-14 PROCEDURE — 700102 HCHG RX REV CODE 250 W/ 637 OVERRIDE(OP): Performed by: HOSPITALIST

## 2017-05-14 PROCEDURE — 36415 COLL VENOUS BLD VENIPUNCTURE: CPT

## 2017-05-14 PROCEDURE — 99225 PR SUBSEQUENT OBSERVATION CARE,LEVEL II: CPT | Performed by: HOSPITALIST

## 2017-05-14 PROCEDURE — 700105 HCHG RX REV CODE 258: Performed by: HOSPITALIST

## 2017-05-14 PROCEDURE — A9270 NON-COVERED ITEM OR SERVICE: HCPCS | Performed by: HOSPITALIST

## 2017-05-14 PROCEDURE — 80048 BASIC METABOLIC PNL TOTAL CA: CPT

## 2017-05-14 PROCEDURE — G0378 HOSPITAL OBSERVATION PER HR: HCPCS

## 2017-05-14 RX ORDER — L. ACIDOPHILUS/L.BULGARICUS 100MM CELL
1 GRANULES IN PACKET (EA) ORAL
Status: DISCONTINUED | OUTPATIENT
Start: 2017-05-14 | End: 2017-05-15 | Stop reason: HOSPADM

## 2017-05-14 RX ORDER — NITROFURANTOIN 25; 75 MG/1; MG/1
100 CAPSULE ORAL 2 TIMES DAILY WITH MEALS
Qty: 8 CAP | Refills: 0 | Status: SHIPPED | OUTPATIENT
Start: 2017-05-14 | End: 2017-07-25

## 2017-05-14 RX ORDER — L. ACIDOPHILUS/L.BULGARICUS 100MM CELL
1 GRANULES IN PACKET (EA) ORAL
COMMUNITY
Start: 2017-05-14 | End: 2017-12-22 | Stop reason: CLARIF

## 2017-05-14 RX ORDER — OXYCODONE HYDROCHLORIDE 5 MG/1
5 TABLET ORAL EVERY 6 HOURS PRN
Status: DISCONTINUED | OUTPATIENT
Start: 2017-05-14 | End: 2017-05-15

## 2017-05-14 RX ORDER — ACETAMINOPHEN 325 MG/1
650 TABLET ORAL EVERY 6 HOURS PRN
Status: DISCONTINUED | OUTPATIENT
Start: 2017-05-14 | End: 2017-05-15 | Stop reason: HOSPADM

## 2017-05-14 RX ORDER — OXYCODONE HYDROCHLORIDE 10 MG/1
10 TABLET ORAL EVERY 6 HOURS PRN
Status: DISCONTINUED | OUTPATIENT
Start: 2017-05-14 | End: 2017-05-15

## 2017-05-14 RX ORDER — OXYCODONE AND ACETAMINOPHEN 10; 325 MG/1; MG/1
1-2 TABLET ORAL EVERY 6 HOURS PRN
Qty: 1 TAB | Refills: 0
Start: 2017-05-14 | End: 2017-07-25

## 2017-05-14 RX ADMIN — HEPARIN SODIUM 5000 UNITS: 5000 INJECTION, SOLUTION INTRAVENOUS; SUBCUTANEOUS at 20:49

## 2017-05-14 RX ADMIN — GABAPENTIN 600 MG: 300 CAPSULE ORAL at 20:48

## 2017-05-14 RX ADMIN — HEPARIN SODIUM 5000 UNITS: 5000 INJECTION, SOLUTION INTRAVENOUS; SUBCUTANEOUS at 14:47

## 2017-05-14 RX ADMIN — LEVOTHYROXINE SODIUM 75 MCG: 75 TABLET ORAL at 05:58

## 2017-05-14 RX ADMIN — SODIUM CHLORIDE: 9 INJECTION, SOLUTION INTRAVENOUS at 06:02

## 2017-05-14 RX ADMIN — Medication 650 MG: at 21:00

## 2017-05-14 RX ADMIN — OMEPRAZOLE 20 MG: 20 CAPSULE, DELAYED RELEASE ORAL at 08:00

## 2017-05-14 RX ADMIN — Medication 650 MG: at 08:01

## 2017-05-14 RX ADMIN — SODIUM CHLORIDE: 9 INJECTION, SOLUTION INTRAVENOUS at 16:10

## 2017-05-14 RX ADMIN — ZIPRASIDONE HCL 160 MG: 40 CAPSULE ORAL at 19:15

## 2017-05-14 RX ADMIN — NITROFURANTOIN MONOHYDRATE AND NITROFURANTOIN MACROCRYSTALLINE 100 MG: 75; 25 CAPSULE ORAL at 09:16

## 2017-05-14 RX ADMIN — LACTOBACILLUS ACIDOPHILUS / LACTOBACILLUS BULGARICUS 1 PACKET: 100 MILLION CFU STRENGTH GRANULES at 17:28

## 2017-05-14 RX ADMIN — GABAPENTIN 600 MG: 300 CAPSULE ORAL at 08:00

## 2017-05-14 RX ADMIN — NITROFURANTOIN MONOHYDRATE AND NITROFURANTOIN MACROCRYSTALLINE 100 MG: 75; 25 CAPSULE ORAL at 17:24

## 2017-05-14 RX ADMIN — HEPARIN SODIUM 5000 UNITS: 5000 INJECTION, SOLUTION INTRAVENOUS; SUBCUTANEOUS at 05:57

## 2017-05-14 RX ADMIN — OXYCODONE HYDROCHLORIDE 10 MG: 10 TABLET ORAL at 20:49

## 2017-05-14 RX ADMIN — OXYCODONE HYDROCHLORIDE 10 MG: 10 TABLET ORAL at 12:16

## 2017-05-14 RX ADMIN — ASPIRIN 81 MG: 81 TABLET, CHEWABLE ORAL at 08:00

## 2017-05-14 RX ADMIN — OXYCODONE HYDROCHLORIDE 10 MG: 10 TABLET ORAL at 05:57

## 2017-05-14 RX ADMIN — LACTOBACILLUS ACIDOPHILUS / LACTOBACILLUS BULGARICUS 1 PACKET: 100 MILLION CFU STRENGTH GRANULES at 12:16

## 2017-05-14 RX ADMIN — FLUOXETINE 10 MG: 10 CAPSULE ORAL at 08:01

## 2017-05-14 RX ADMIN — OMEPRAZOLE 20 MG: 20 CAPSULE, DELAYED RELEASE ORAL at 20:49

## 2017-05-14 RX ADMIN — PROMETHAZINE HYDROCHLORIDE 25 MG: 25 TABLET ORAL at 08:01

## 2017-05-14 RX ADMIN — Medication 650 MG: at 15:00

## 2017-05-14 RX ADMIN — GABAPENTIN 600 MG: 300 CAPSULE ORAL at 14:47

## 2017-05-14 ASSESSMENT — PAIN SCALES - GENERAL
PAINLEVEL_OUTOF10: 5
PAINLEVEL_OUTOF10: 8
PAINLEVEL_OUTOF10: 8
PAINLEVEL_OUTOF10: 5

## 2017-05-14 NOTE — PROGRESS NOTES
Assumed care, assessment complete. Pt is A & O x 4. Pt medicated for pain per MAR. Fall precautions and appropriate signs in place. Pt oriented to unit routine, call light/phone system and RN extension number provided. Pt educated regarding fall precautions. Bed alarm in use. Pt denies any additional needs at this time. Call light within reach. Pt meds taken to pharmacy.

## 2017-05-14 NOTE — ED NOTES
Report from Carmenza HASSAN. Assumed care of pt. Pt resting quietly with eyes closed, arouses to loud verbal stimulation. Pt is oriented x4. Pt reports she is unable to move bilat LE, c/o neck and low back pain. Pt easily falls asleep during assessment, then wakes up and asks for dinner. NO movement to bilat LE, moves UE freely,  strong and equal. Updated pt on POC. Call light in reach.

## 2017-05-14 NOTE — CONSULTS
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 Patient comes in complaining of some low back pain at the area of her prior spinal surgery and numbness and paralysis of her lower extremities. She was in no distress. She may have lost consciousness. She has pain at the back of her head as well as her neck. She denies chest pain or shortness of breath. She denies abdominal pain or nausea. She denies pelvic pain. She denies pain in the upper extremities. She states she cannot feel her legs and that she cannot move her legs.    REVIEW OF SYSTEMS:  As per HPI and is otherwise negative    PAST MEDICAL HISTORY:  •  Scoliosis      •  Multiple personality disorder      •  Chronic UTI  9/18/2010    •  Heart burn      •  Pain  08-15-12        back, 7/10    •  History of falling      •  Sinus tachycardia  10/31/2013    •  Urinary incontinence      •  Hypertension      •  Disorder of thyroid      •  Obesity      •  Pneumonia  2012    •  ASTHMA          when around smoke    •  Breath shortness          with tachycardia    •  Anginal syndrome          random chest pain especially with tachycardia    •  Psychosis      •  Arthritis          osteo    •  PCOS (polycystic ovarian syndrome)      •  Gynecological disorder          PCOS    •  Renal disorder          \"kidney disease, stage 1\" nephrologist, Dr. Vallejo    •  Arrhythmia          \"sinus tachycardia\", cariologist, Dr. 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    •  Sleep apnea          " "CPAP \"pulmonary doctor took me off mid year 2016\"    •  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CMS-HCC)      •  Fall                 PAST SURGICAL HISTORY:  Lumbar spine surgery, bowel stimulator    FAMILY HISTORY:  Noncontributory    SOCIAL HISTORY:  Denies use of tobacco alcohol and illicit drugs. She is on chronic prescription narcotics    ALLERGIES: Numerous drug allergies that are recorded in the electronic medical record that includes multiple psychoactive medications in most classes of antibiotics    MEDICATIONS:  Prilosec, levothyroxine, sodium bicarbonate, albuterol, vitamin B-12, Phenergan, lactulose, vitamin D, fentanyl patch, Geodon, aspirin, melatonin, nitroglycerin, Percocet 10 mg, Prozac, Corlanor, furosemide, Zantac, baclofen, Gralise, Victoza, cranberry    VITAL SIGNS:  /73 mmHg  Pulse 62  Temp(Src) 36.1 °C (97 °F)  Ht 1.651 m (5' 5\")  Wt 108.863 kg (240 lb)  BMI 39.94 kg/m2  SpO2 99%    PHYSICAL EXAMINATION:  Normocephalic and atraumatic, extraocular muscles intact pupils equal reactive to light. The oropharynx is clear and moist. Tympanic membranes are clear bilaterally. Cervical spine is nontender with good active range of motion and no step-off. Chest is clear bilaterally with equal rise and no crepitus or tenderness. Heart is regular without murmur pulses are palpable in all 4 extremities. Abdomen soft, nontender nondistended. Pelvis is stable. Perineum is clear. Spine is minimally tender in the lumbar area without step-off or instability. Upper extremities have good active range of motion with no gross deformities. Strength is 5 out of 5 throughout. Lower extremities have some club foot type deformity bilaterally. She denies being able to feel stimulation via soft touch but can detect pinprick. She has a downward going Babinski bilaterally. Some spontaneous movement of the lower extremities was noted while she was left unattended the ER bed. Upper extremity sensory motor examination is grossly " preserved. Kathy Coma Score is 15.    LABORATORY STUDIES:  CBC entirely within normal range  Coagulation profile entirely within normal range  Glucose 117 ALT 61, otherwise chemistries in the normal range  Lactate is 2.4    DIAGNOSTIC STUDIES:  CT scan of the head, neck, chest abdomen and pelvis, thoracic or lumbar spine showed no sign of acute injuries    ASSESSMENT AND PLAN:  26-year-old female with very complex medical and psychiatric history who allegedly fell down a flight of stairs. She has no outward signs of trauma, which is suspicious considering a fall down 11 steps. Considering that there are no signs of injury on imaging, and patient had recent admission for psychogenic paralysis, I think it is appropriate for her to be assessed by the medical service and possibly the neurology service. While we cannot dismiss the paraplegia, elements of her physical examination make a diagnosis of spinal cord injury less likely. Patient cannot have an MRI with her stimulator in place. Close observation with neuro checks would be appropriate and if there are any concerns, suggestions for further imaging might be obtained from the neurologist or spine surgeon.     Please contact the trauma service if there are any questions or concerns.     This note was dictated using voice recognition software and as such may have  inaccuracies that do not reflect patient care plan.

## 2017-05-14 NOTE — DISCHARGE PLANNING
Sw responded to trauma red.  Pt was BIB [facility] after a fall.  Pt was A&Ox4 upon arrival.  Pts name is Kristin Balderrama (: 1990).  Sw obtained the following pt information: pt feel down a flight of 11 stairs and reports having no feeling in her legs. Pt has an extensive medical history. EMS reported that pts grandmother was on scene and aware of pts current location. SW called pts grandmother, Vivienne ([phone]), and left her a message encouraging a return call.

## 2017-05-14 NOTE — PROGRESS NOTES
Assumed pt care after receiving bedside report, pt resting in bed AAOx4, pt medicated for pain per MAR, pt on 3 L O2 NC, admit profile completed, pt unable to ambulate due to numbness/tingling BLLE following fall. Fall measures in place. Bed alarm is on call light within reach, personal belongings close-by, bed in lowest position, IV pole on same side of bathroom, upper bed rails up, hourly rounding in place. Will continue to monitor pt for safety.

## 2017-05-14 NOTE — ED PROVIDER NOTES
ED Provider Note    CHIEF COMPLAINT  No chief complaint on file.   fall downstairs.  Acute paralysis    HPI  Yessica Weaver is a 26 y.o. female who presents to the ER complaining of a fall downstairs with inability to move her legs.  Sensation states she is not exactly sure happened, which is The stairs and the next thing she knew she was bottom of the stairs and unable to move her legs.  The family was unable to move.  So EMS was called and she was brought in as a trauma.  Patient states she has sensation about her umbilicus below that.  No sensation, inability to move her legs.  She denies any extremity pain.  She denies any other acute concerns or complaints.  History is somewhat limited because her baseline  Psychiatric disease.  Denies any aspirin or shortness of breath.    REVIEW OF SYSTEMS  See HPI for further details.  Unable to obtain complete review of systems.    PAST MEDICAL HISTORY  No past medical history on file.  Patient has a complicated past medical history his past reviewed on her regular chart  Schizophrenia.  Spina bifida, diabetes    FAMILY HISTORY  No family history on file.    SOCIAL HISTORY  Social History     Social History   • Marital Status: Single     Spouse Name: N/A   • Number of Children: N/A   • Years of Education: N/A     Social History Main Topics   • Smoking status: Not on file   • Smokeless tobacco: Not on file   • Alcohol Use: Not on file   • Drug Use: Not on file   • Sexual Activity: Not on file     Other Topics Concern   • Not on file     Social History Narrative   • No narrative on file       SURGICAL HISTORY  No past surgical history on file.    CURRENT MEDICATIONS  Home Medications     Reviewed by Milagro Lewis, Pharmacy Int (Pharmacy Intern) on 05/13/17 at 1702  Med List Status: Complete    Medication Last Dose Status    albuterol 108 (90 BASE) MCG/ACT Aero Soln inhalation aerosol >2 days Active    aspirin (ASA) 81 MG Chew Tab chewable tablet 5/13/2017 Active    Cranberry  "400 MG Tab 5/13/2017 Active    cyanocobalamin (VITAMIN B-12) 500 MCG Tab 5/13/2017 Active    fluoxetine (PROZAC) 10 MG Cap 5/13/2017 Active    furosemide (LASIX) 40 MG Tab 5/13/2017 Active    gabapentin (NEURONTIN) 300 MG Cap 5/13/2017 Active    ivabradine (CORLANOR) 5 MG Tab tablet 5/13/2017 Active    lactulose 10 GM/15ML Solution >2 days Active    levothyroxine (SYNTHROID) 75 MCG Tab 5/13/2017 Active    liraglutide (VICTOZA) 18 MG/3ML Solution Pen-injector injection 5/13/2017 Active    Melatonin 5 MG Tab 5/12/2017 Active    omeprazole (PRILOSEC) 20 MG delayed-release capsule 5/13/2017 Active    oxycodone-acetaminophen (PERCOCET-10)  MG Tab 5/13/2017 Active    promethazine (PHENERGAN) 25 MG Tab 5/13/2017 Active    ranitidine (ZANTAC) 150 MG Tab 5/13/2017 Active    sodium bicarbonate 325 MG Tab 5/13/2017 Active    ziprasidone (GEODON) 80 MG Cap 5/12/2017 Active                ALLERGIES  Allergies not on file the patient has multiple medication allergies listed in her regular chart.        PHYSICAL EXAM  VITAL SIGNS: /73 mmHg  Pulse 68  Temp(Src) 36.1 °C (97 °F)  Resp 16  Ht 1.651 m (5' 5\")  Wt 108.863 kg (240 lb)  BMI 39.94 kg/m2  SpO2 99%     Constitutional: An full spinal precautions, no acute distress  HENT: Normocephalic, Atraumatic, Bilateral external ears normal, Oropharynx moist, No oral exudates, Nose normal.   Eyes: PERRL, EOMI, Conjunctiva normal, No discharge.   Neck: Cervical collar, not arranged  Cardiovascular: Normal heart rate, Normal rhythm, No murmurs, No rubs, No gallops.   Thorax & Lungs: Normal breath sounds, No respiratory distress, No wheezing, No chest tenderness.   Abdomen: Bowel sounds normal, Soft, No tenderness,  Skin: Warm, Dry, No erythema, No rash.   Back: No tenderness, No CVA tenderness.  Musculoskeletal: Good passive range of motion in all major joints.   Neurologic: Alert, somewhat slow to respond.  Moves both upper extremities.  Has absent movement sensation " both lower extremities.  Psychiatric: Affect anxious      RADIOLOGY/PROCEDURES  CT-CHEST,ABDOMEN,PELVIS WITH   Final Result      No solid organ or vascular injury is identified.      Hepatic steatosis.      Suprapubic catheter is present.      Status post cholecystectomy.      Borderline splenomegaly.      CT-LSPINE W/O PLUS RECONS   Final Result      No fracture or subluxation is identified.      Post surgical changes at L4/L5.      Facet arthropathy at L5/S1.      Hepatic steatosis.      CT-TSPINE W/O PLUS RECONS   Final Result      No fracture or subluxation is identified.      Hepatic steatosis.      CT-CSPINE WITHOUT PLUS RECONS   Final Result      No evidence of cervical spine fracture.      CT-HEAD W/O   Final Result      No evidence of acute intracranial process.            COURSE & MEDICAL DECISION MAKING  Pertinent Labs & Imaging studies reviewed. (See chart for details)  Patient presents to the trauma red.  Dr. Lynn was present in the trauma room.  Prior to arrival.    Patient presents with no movement in her lower extremities.  She is worked up for trauma.  The CTs, all appear negative.  Dr. Lynn felt that medical admission was warranted and not necessarily trauma admission.    The patient's medical record number for her regular game.  Emergency [id number].  This is reviewed.  She very recently had an admission for the same thing with acute paralysis of both legs.  She was worked up extensively.  She is unable to get an MRI then or now because of a gastric stimulator is a contraindication or MRI because the metal.  The patient's been in the hospital several times this year alone for weakness in her legs.  This is felt often to be a conversion disorder.  There is really no physical evidence of trauma at this time.  Again, I suspect this could be a conversion disorder.  History of traumas elevates the concern is for the pattern of injury, but the CTs are negative.  She cannot undergo an MRI.  I don't  think we need a neurosurgical consultation, the absence of bony abnormality and the inability to get MRI.  She'll be admitted and observed.    Neurology was consulted last time, ultimately no cause for this was found on her last visit and she was discharged home.    Patient's family is at bedside stating that she has episodes of paralysis like this did happen frequently and they would like to take her home.  They state that they can handle her at home and that she has a wheelchair.  I is not appropriate.  I think that she doesn't be admitted maybe she has physical therapy, occupational therapy.  I reviewed there is a very a chance that this is psychiatric.  I don't think this likely be having surgical on on her at this time.  She'll be admitted for further workup and treatment    The patient's previous record was reviewed.  She had enterococcus faecalis culture from her urine and this will be treated with antibiotics.  She'll be admitted for further workup and treatment.      FINAL IMPRESSION  1. Enterococcus faecalis infection    2. Weakness    3. Schizo affective schizophrenia (CMS-[facility])        3.         Electronically signed by: Brian Amanda, 5/13/2017 8:06 PM

## 2017-05-14 NOTE — PROGRESS NOTES
Pt stated that she felt like her glucose level was high and requested a FS. Checked FSBG per request w/ result of 100.

## 2017-05-14 NOTE — H&P
DATE OF ADMISSION:  05/13/2017    CHIEF COMPLAINT:  Fall downstairs.      HISTORY OF PRESENT ILLNESS:  Patient is a 26-year-old female that has an   extensive past medical history which includes multiple personality disorder,   type 2 diabetes and schizophrenia.  She also has neurogenic bladder with a   suprapubic catheter at home.  She uses a walker or a wheelchair to get around.    Today, she apparently had a fall down the stairs and presented as a trauma.    The patient states that she was in her normal state of health and the next   thing she remembers is being at the bottom of 11 stairs and she does not   recall the event, but she does state that she has been having severe back pain   since that time.  Currently, she just complains of back pain.  She has had an   extensive workup and was cleared by trauma, C-collar was removed as well as   she had a CT of her C-spine, T-spine, L spine, and her head, which was all   unremarkable.  Patient in the ER complained she is unable to move much and is   unable to move her lower extremities and is too weak.  According to her chart,   she does have a past history of possible quadriparesis, which is temporary in   nature and likely psychiatric.  Nonetheless, she also reports that she was   recently treated with IV steroids.  She has indwelling suprapubic catheter and   she has been having burning and discomfort down there and recently she had   urinalysis and cultures drawn, which did show Enterococcus faecalis.  She has   not started treatment for this yet.  She denies any fevers, chills and no   other complaints at this time.      REVIEW OF SYSTEMS:  As per HPI.  All other systems have been reviewed and are   negative.      ALLERGIES:  CEFDINIR, DEPAKOTE, ABILIFY, AMITRIPTYLINE, AMOXICILLIN,   CIPROFLOXACIN, CLINDAMYCIN, DOXYCYCLINE, ERYTHROMYCIN, FLAGYL, FLOMAX,   METFORMIN, SULFA, TAPE, VANCOMYCIN, KEFLEX, CLARITHROMYCIN, LEVAQUIN,   METRONIDAZOLE AND VALPROIC ACID.       FAMILY HISTORY:  Has been reviewed and is not pertinent to her current   presentation.      SOCIAL HISTORY:  She denies tobacco, drugs, or alcohol use.      PAST MEDICAL HISTORY:    1.  Type 2 diabetes.    2.  Multiple personality disorder.    3.  Schizophrenia.    4.  Neurogenic bladder with suprapubic catheter.    5.  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      PAST SURGICAL HISTORY:  Suprapubic catheter placement.      HOME MEDICATIONS:    1.  Prilosec 20 mg b.i.d.   2.  Synthroid 75 mcg daily.    3.  Sodium bicarbonate 650 mg t.i.d.    4.  Albuterol inhaler.    5.  Vitamin B12 daily.    6.  Phenergan as needed.    7.  Lactulose b.i.d. as needed.    8.  Geodon 160 mg every evening.    9.  Aspirin 81 mg daily.    10.  Melatonin at bedtime.    11.  Percocet as needed.    12.  Prozac 10 mg daily.    13.  Corlanor 5 mg b.i.d.    14.  Lasix.    15.  Ranitidine.    16.  Gabapentin 600 mg t.i.d.    17.  Victoza 1.2 mg injection daily.    18.  _____.      PHYSICAL EXAMINATION:    VITAL SIGNS:  Blood pressure is 125/73, pulse is 65, respirations are 16,   temperature 36.1 and oxygen saturations 98% on 4 L nasal cannula.    GENERAL:  Patient appears chronically ill, currently not in any acute   distress.    HEENT:  Moist mucous membranes.  No oropharyngeal exudates.  Eyes, EOMI,   PERRLA.    NECK:  No lymphadenopathy, no JVD.    CARDIOVASCULAR:  Regular rate and rhythm.  No murmurs.    LUNGS:  Clear to auscultation bilaterally.  No rales or rhonchi.    ABDOMEN:  Positive bowel sounds, soft, nontender, nondistended.    GENITOURINARY:  She has a suprapubic catheter with a Andrade bag, clear urine.    EXTREMITIES:  No clubbing or cyanosis.    NEUROLOGIC:  She is awake, alert, oriented to person, place, time, and   situation.    MUSCULOSKELETAL:  She states she is unable to move her lower extremity   secondary to pain in her mid back.      IMAGING:  CT C-spine, T spine, L spine, chest, abdomen, pelvis and CT  head are   all unremarkable.      LABORATORY DATA:  WBC 6.4, hemoglobin 13.5, hematocrit 40.1 and platelets 222.    Sodium 136, potassium is 3.9, BUN 15, creatinine 0.87, lactic acid 2.4.      ASSESSMENT AND PLAN:  Patient is a 26-year-old female that presents after a   fall at home.    1.  Fall at home.  She presented as a trauma red, there are no acute findings   on any of the imaging.  She complains of having weakness in her lower   extremities, is unable to move because of pain in her back.  We will consult   physical therapy and occupational therapy for recommendations.  She already   has a walker and wheelchair at home.  We will also treat her with oral and IV   pain medications for pain control.    2.  Lactic acidosis.  She does appear to be dry.  We will treat her with IV   fluids.    3.  Enterococcus faecalis urinary tract infection.  Patient reports dysuria   and having discomfort.  Cultures were drawn a few days ago.  At this point, we   will start her on oral Macrobid 100 mg p.o. b.i.d. for a total of 5 days.    4.  History of multiple personality disorder.    5.  Hypothyroidism.  Continue Synthroid 75 mcg daily.    6.  Diabetes.    7.  Depression.  Continue with Prozac 10 mg daily.    8.  Peripheral neuropathy.    9.  Deep venous thrombosis prophylaxis.  Continue with heparin 5000 units   t.i.d.    10.  She is full code.       ____________________________________     MD DENIA Baires / CHRISTELLE    DD:  05/13/2017 17:40:09  DT:  05/13/2017 20:45:50    D#:  [id number]  Job#:  [id number]

## 2017-05-15 VITALS
HEIGHT: 65 IN | SYSTOLIC BLOOD PRESSURE: 111 MMHG | TEMPERATURE: 98 F | BODY MASS INDEX: 43.75 KG/M2 | RESPIRATION RATE: 17 BRPM | WEIGHT: 262.57 LBS | HEART RATE: 77 BPM | DIASTOLIC BLOOD PRESSURE: 65 MMHG | OXYGEN SATURATION: 98 %

## 2017-05-15 LAB — GLUCOSE BLD-MCNC: 82 MG/DL (ref 65–99)

## 2017-05-15 PROCEDURE — 700111 HCHG RX REV CODE 636 W/ 250 OVERRIDE (IP): Performed by: HOSPITALIST

## 2017-05-15 PROCEDURE — G8987 SELF CARE CURRENT STATUS: HCPCS | Mod: CJ

## 2017-05-15 PROCEDURE — 82962 GLUCOSE BLOOD TEST: CPT

## 2017-05-15 PROCEDURE — G8978 MOBILITY CURRENT STATUS: HCPCS | Mod: CI

## 2017-05-15 PROCEDURE — 99217 PR OBSERVATION CARE DISCHARGE: CPT | Performed by: HOSPITALIST

## 2017-05-15 PROCEDURE — A9270 NON-COVERED ITEM OR SERVICE: HCPCS | Performed by: HOSPITALIST

## 2017-05-15 PROCEDURE — G8980 MOBILITY D/C STATUS: HCPCS | Mod: CI

## 2017-05-15 PROCEDURE — 97165 OT EVAL LOW COMPLEX 30 MIN: CPT

## 2017-05-15 PROCEDURE — 700102 HCHG RX REV CODE 250 W/ 637 OVERRIDE(OP): Performed by: HOSPITALIST

## 2017-05-15 PROCEDURE — G8989 SELF CARE D/C STATUS: HCPCS | Mod: CJ

## 2017-05-15 PROCEDURE — G8988 SELF CARE GOAL STATUS: HCPCS | Mod: CJ

## 2017-05-15 PROCEDURE — G0378 HOSPITAL OBSERVATION PER HR: HCPCS

## 2017-05-15 PROCEDURE — G8979 MOBILITY GOAL STATUS: HCPCS | Mod: CI

## 2017-05-15 PROCEDURE — 97161 PT EVAL LOW COMPLEX 20 MIN: CPT

## 2017-05-15 RX ORDER — OXYCODONE HYDROCHLORIDE 5 MG/1
5 TABLET ORAL EVERY 8 HOURS PRN
Status: DISCONTINUED | OUTPATIENT
Start: 2017-05-15 | End: 2017-05-15 | Stop reason: HOSPADM

## 2017-05-15 RX ORDER — OXYCODONE HYDROCHLORIDE 10 MG/1
10 TABLET ORAL EVERY 8 HOURS PRN
Status: DISCONTINUED | OUTPATIENT
Start: 2017-05-15 | End: 2017-05-15 | Stop reason: HOSPADM

## 2017-05-15 RX ORDER — BISACODYL 10 MG
10 SUPPOSITORY, RECTAL RECTAL ONCE
Status: DISCONTINUED | OUTPATIENT
Start: 2017-05-15 | End: 2017-05-15 | Stop reason: HOSPADM

## 2017-05-15 RX ORDER — POLYETHYLENE GLYCOL 3350 17 G/17G
1 POWDER, FOR SOLUTION ORAL
Status: DISCONTINUED | OUTPATIENT
Start: 2017-05-15 | End: 2017-05-15 | Stop reason: HOSPADM

## 2017-05-15 RX ADMIN — OMEPRAZOLE 20 MG: 20 CAPSULE, DELAYED RELEASE ORAL at 08:34

## 2017-05-15 RX ADMIN — HEPARIN SODIUM 5000 UNITS: 5000 INJECTION, SOLUTION INTRAVENOUS; SUBCUTANEOUS at 06:02

## 2017-05-15 RX ADMIN — Medication 650 MG: at 08:40

## 2017-05-15 RX ADMIN — OXYCODONE HYDROCHLORIDE 5 MG: 5 TABLET ORAL at 06:02

## 2017-05-15 RX ADMIN — FLUOXETINE 10 MG: 10 CAPSULE ORAL at 08:34

## 2017-05-15 RX ADMIN — LEVOTHYROXINE SODIUM 75 MCG: 75 TABLET ORAL at 06:02

## 2017-05-15 RX ADMIN — ASPIRIN 81 MG: 81 TABLET, CHEWABLE ORAL at 08:34

## 2017-05-15 RX ADMIN — LACTOBACILLUS ACIDOPHILUS / LACTOBACILLUS BULGARICUS 1 PACKET: 100 MILLION CFU STRENGTH GRANULES at 08:34

## 2017-05-15 RX ADMIN — MAGNESIUM HYDROXIDE 30 ML: 400 SUSPENSION ORAL at 08:44

## 2017-05-15 RX ADMIN — GABAPENTIN 600 MG: 300 CAPSULE ORAL at 08:34

## 2017-05-15 RX ADMIN — PROMETHAZINE HYDROCHLORIDE 25 MG: 25 TABLET ORAL at 08:34

## 2017-05-15 RX ADMIN — NITROFURANTOIN MONOHYDRATE AND NITROFURANTOIN MACROCRYSTALLINE 100 MG: 75; 25 CAPSULE ORAL at 09:53

## 2017-05-15 ASSESSMENT — GAIT ASSESSMENTS
GAIT LEVEL OF ASSIST: STAND BY ASSIST
ASSISTIVE DEVICE: FRONT WHEEL WALKER
DEVIATION: ANTALGIC;DECREASED BASE OF SUPPORT;DECREASED HEEL STRIKE;DECREASED TOE OFF;OTHER (COMMENT)
DISTANCE (FEET): 150

## 2017-05-15 ASSESSMENT — ACTIVITIES OF DAILY LIVING (ADL): TOILETING: REQUIRES ASSIST

## 2017-05-15 ASSESSMENT — PAIN SCALES - GENERAL
PAINLEVEL_OUTOF10: 8
PAINLEVEL_OUTOF10: 3

## 2017-05-15 NOTE — THERAPY
"Occupational Therapy Evaluation completed.   Functional Status:  Pt seen for OT eval with hx of spina bifida, schizophrenia, and multiple other medical diagnoses. Pt appears near baseline. Supervision-Min A with ADLs and dependent for IADLs. Pt's grandmother is full time caregiver. Pt reports around 1 fall per month while using her 4WW. Discussed safety of FWW and home adaptations.   Plan of Care: Patient with no further skilled OT needs in the acute care setting at this time  Discharge Recommendations:  Equipment: No Equipment Needed. Post-acute therapy Currently anticipate no further skilled therapy needs once patient is discharged from the inpatient setting.    See \"Rehab Therapy-Acute\" Patient Summary Report for complete documentation.    "

## 2017-05-15 NOTE — DISCHARGE SUMMARY
Since the transfer summary was dictated on 5/14/2017 patient remained in the hospital awaiting home disposition, remaining hemodynamically stable.  She is to continue with the follow up as outlined in the previous summary and updated medication list as below.    Total DC time process 38 min.      DISCHARGE PROBLEM LIST  Active Problems:    UTI (urinary tract infection) POA: Unknown    Weakness POA: Unknown    Enterococcus faecalis infection POA: Unknown    Fall at home POA: Unknown  Resolved Problems:    * No resolved hospital problems. *      FOLLOW UP  No future appointments.  Torres Brody M.D.  [address]  [facility] [address]  [phone]    Go on 5/23/2017  Please arrive at 8:05 am for a 8:20 am appointment. Thank you.      MEDICATIONS ON DISCHARGE   Seven, [place]inch   Home Medication Instructions DANIAL:[id number]    Printed on:05/15/17 1201   Medication Information                      albuterol 108 (90 BASE) MCG/ACT Aero Soln inhalation aerosol  Inhale 2 Puffs by mouth every 6 hours as needed for Shortness of Breath.             aspirin (ASA) 81 MG Chew Tab chewable tablet  Take 81 mg by mouth every day.             Cranberry 400 MG Tab  Take 2 Tabs by mouth every day.             cyanocobalamin (VITAMIN B-12) 500 MCG Tab  Take 1,000 mcg by mouth every day.             fluoxetine (PROZAC) 10 MG Cap  Take 10 mg by mouth every day.             furosemide (LASIX) 40 MG Tab  Take 40 mg by mouth every day.             gabapentin (NEURONTIN) 300 MG Cap  Take 600 mg by mouth 3 times a day.             ivabradine (CORLANOR) 5 MG Tab tablet  Take 5 mg by mouth 2 times a day, with meals.             lactobacillus granules (LACTINEX/FLORANEX) Pack  Take 1 Packet by mouth 3 times a day, with meals.             lactulose 10 GM/15ML Solution  Take 10 g by mouth 2 times a day as needed.             levothyroxine (SYNTHROID) 75 MCG Tab  Take 75 mcg by mouth Every morning on an empty stomach.             liraglutide  (VICTOZA) 18 MG/3ML Solution Pen-injector injection  Inject 1.2 mg as instructed every day.             Melatonin 5 MG Tab  Take 10 mg by mouth every bedtime.             nitrofurantoin monohydr macro (MACROBID) 100 MG Cap  Take 1 Cap by mouth 2 times a day, with meals.             omeprazole (PRILOSEC) 20 MG delayed-release capsule  Take 20 mg by mouth 2 times a day.             oxycodone-acetaminophen (PERCOCET-10)  MG Tab  Take 1-2 Tabs by mouth every 6 hours as needed for Severe Pain. Scheduled.             promethazine (PHENERGAN) 25 MG Tab  Take 25 mg by mouth every day.             ranitidine (ZANTAC) 150 MG Tab  Take 150 mg by mouth 2 times a day.             sodium bicarbonate 325 MG Tab  Take 650 mg by mouth 3 times a day.             ziprasidone (GEODON) 80 MG Cap  Take 160 mg by mouth every evening.                 LABORATORY  Lab Results   Component Value Date/Time    SODIUM 138 05/14/2017 12:23 AM    POTASSIUM 4.2 05/14/2017 12:23 AM    CHLORIDE 106 05/14/2017 12:23 AM    CO2 25 05/14/2017 12:23 AM    GLUCOSE 106* 05/14/2017 12:23 AM    BUN 11 05/14/2017 12:23 AM    CREATININE 0.77 05/14/2017 12:23 AM        Lab Results   Component Value Date/Time    WBC 5.4 05/14/2017 12:23 AM    HEMOGLOBIN 12.5 05/14/2017 12:23 AM    HEMATOCRIT 37.4 05/14/2017 12:23 AM    PLATELET COUNT 199 05/14/2017 12:23 AM

## 2017-05-15 NOTE — PROGRESS NOTES
Assumed care of pt after receiving report from dayshift RN. Bedside rounding completed w/ dayshift RN. Pt resting in bed A&OX4, pt medicated per MAR, Q2 turns in place, pt has suprapubic cath. Fall measures in place, call light within reach, personal belongings nearby, bed in lowest position, and hourly rounding in place. Will continue to monitor pt.

## 2017-05-15 NOTE — THERAPY
"Physical Therapy Evaluation completed.   Bed Mobility:  Supine to Sit: Supervised  Transfers: Sit to Stand: Stand by Assist  Gait: Level Of Assist: Stand by Assist with Front-Wheel Walker       Plan of Care: Patient with no further skilled PT needs in the acute care setting at this time  Discharge Recommendations: Equipment: No Equipment Needed. See below    After initial evaluation and pt/caregiver education, pt has no further skilled acute PT needs at this time. Pt and caregiver report no concerns with pt's functional ability to enter/exit and navigate home environment at this time. Pt appears to have all appropriate AD's and caregiver support as needed. Reports hx of falls without use of AD and 2' to medical co-morbidities (lethargy/confusion 2' to low BS, etc). Anticiapte no immediate skilled PT needs after medical d/c to home.     See \"Rehab Therapy-Acute\" Patient Summary Report for complete documentation.     "

## 2017-05-15 NOTE — PROGRESS NOTES
Pt d/c'd home with family via wheelchair. Pt was given prescriptions with instructions for use. D/c instructions including scheduling a f/u appointment was provided and pt demonstrated understanding.

## 2017-05-15 NOTE — DISCHARGE SUMMARY
DATE OF ADMISSION:  05/13/2017    DATE OF DISCHARGE:  05/14/2017    DISCHARGE DIAGNOSES:  1.  Fall down the stairs with negative C-spine, L-spine, and T-spine, head CT;   chest and abdominopelvic CT.  2.  Presumptive urinary tract infection with no labs available.    OTHER DIAGNOSES:  Morbid obesity, diabetes type 2, schizophrenia, neurogenic   bladder, spastic quadriparesis, hypothyroidism, depression.    ADMISSION HISTORY:  Please refer to admission note of 05/13/2017 for details.    HOSPITAL COURSE:  The patient is a 26-year-old female with history of multiple   medical problems including morbid obesity, schizophrenia and neurogenic   bladder who presents with episode of fall down the stairs.  Patient does not   know the details preceding this episode.  Patient states that she did hit her   head, but does not know if she lost her consciousness prior or after she hit   her head.  Furthermore, she does not know if she had any nausea or vomiting.    Because she complained of having an infection, patient was empirically started   on Macrobid.  There is no evidence of infection.  She has no white cell   count, leukocytosis or any urinary analysis indicative for infection.  Upon   admission, the patient was evaluated with head CT; C-spine, T-spine, L-spine   CTs; chest and abdominopelvic CT with negative findings.  Today, patient was   feeling much better with some movement of her legs back.  PT, OT evaluation   has been requested.  With their recommendation, patient will be discharged   home.  At the time of the dictation, patient was feeling better, eating and   drinking well, having good bowel movements, and was hemodynamically stable.    CONSULTATIONS OBTAINED:  None.    PROCEDURES:  Head CT; C-spine, T-spine and L-spine CT; chest and   abdominopelvic CT on 05/14/2017.    DISCHARGE MEDICATIONS:  Albuterol MDI 2 puffs q. 6 hours p.r.n. for dyspnea,   aspirin 81 mg daily, cranberry 800 mg daily, cyanocobalamin 1000  mcg daily,   fluoxetine 10 mg daily, furosemide 40 mg daily, gabapentin 600 mg t.i.d.,   ivabradine 5 mg b.i.d., Lactobacillus granules 1 packet 3 times a day,   Lactulose 10 g b.i.d. p.r.n. for constipation, levothyroxine 75 mcg daily,   liraglutide 1.2 mg per home regimen, melatonin 10 mg at bedtime, Macrobid 100   mg b.i.d., omeprazole 20 mg b.i.d., oxycodone 10/325 1-2 tabs q. 6 hours   p.r.n. for severe pain., promethazine 25 mg daily, ranitidine 150 mg b.i.d.,   sodium bicarbonate 650 mg t.i.d., ziprasidone 160 mg at bedtime.    DISCHARGE DIET:  1800 kilocalorie diabetic diet.    DISCHARGE ACTIVITY:  Gradual increase in activity.    DISCHARGE FOLLOWUP:  1.  With PCP/[facility] in approximately 2-3 weeks.  2.  [facility] as scheduled.    TOTAL DISCHARGE TIME PROCESS:  31 minutes.       ____________________________________     MD SERVANDO ZHANG / CHRISTELLE    DD:  05/14/2017 15:04:32  DT:  05/14/2017 22:08:12    D#:  [id number]  Job#:  [id number]    cc: HAKAN GUSTAFSON DO

## 2017-05-15 NOTE — CARE PLAN
Problem: Knowledge Deficit  Goal: Knowledge of the prescribed therapeutic regimen will improve  Pt displayed understanding and knowledge about disease process and treatment plan    Problem: Discharge Barriers/Planning  Goal: Patient’s continuum of care needs will be met  Intervention: Explain discharge instructions and medication reconcilliation to patient and significant other/support system  D/c instructions including scheduling a f/u appointment was provided and pt demonstrated understanding.

## 2017-05-15 NOTE — DISCHARGE INSTRUCTIONS
Discharge Instructions    Discharged to home by car with relative. Discharged via wheelchair, hospital escort: Yes.  Special equipment needed: Not Applicable    Be sure to schedule a follow-up appointment with your primary care doctor or any specialists as instructed.     Discharge Plan:   Influenza Vaccine Indication: Not indicated: Previously immunized this influenza season and > 8 years of age    I understand that a diet low in cholesterol, fat, and sodium is recommended for good health. Unless I have been given specific instructions below for another diet, I accept this instruction as my diet prescription.   Other diet: DM    Special Instructions: None    · Is patient discharged on Warfarin / Coumadin?   No     · Is patient Post Blood Transfusion?  No    Depression / Suicide Risk    As [facility] are [facility], it is important to learn how to keep safe from harming yourself.    Recognize the warning signs:  · Abrupt changes in personality, positive or negative- including increase in energy   · Giving away possessions  · Change in eating patterns- significant weight changes-  positive or negative  · Change in sleeping patterns- unable to sleep or sleeping all the time   · Unwillingness or inability to communicate  · Depression  · Unusual sadness, discouragement and loneliness  · Talk of wanting to die  · Neglect of personal appearance   · Rebelliousness- reckless behavior  · Withdrawal from people/activities they love  · Confusion- inability to concentrate     If you or a loved one observes any of these behaviors or has concerns about self-harm, here's what you can do:  · Talk about it- your feelings and reasons for harming yourself  · Remove any means that you might use to hurt yourself (examples: pills, rope, extension cords, firearm)  · Get professional help from the community (Mental Health, Substance Abuse, psychological counseling)  · Do not be alone:Call your Safe Contact-  someone whom you trust who will be there for you.  · Call your local CRISIS HOTLINE 350-8739 or 537-518-5212  · Call your local Children's Mobile Crisis Response Team [place] (101) 583-1701 or www.[employer]  · Call the toll free National Suicide Prevention Hotlines   · National Suicide Prevention Lifeline 702-295-NCAV (4883)  · [employer] Network 800-SUICIDE (921-9372)

## 2017-05-16 ENCOUNTER — OFFICE VISIT (OUTPATIENT)
Dept: BEHAVIORAL HEALTH | Facility: PHYSICIAN GROUP | Age: 27
End: 2017-05-16
Payer: MEDICARE

## 2017-05-16 ENCOUNTER — APPOINTMENT (OUTPATIENT)
Dept: SLEEP MEDICINE | Facility: MEDICAL CENTER | Age: 27
End: 2017-05-16
Payer: MEDICARE

## 2017-05-16 VITALS
RESPIRATION RATE: 16 BRPM | TEMPERATURE: 98.1 F | DIASTOLIC BLOOD PRESSURE: 78 MMHG | WEIGHT: 245 LBS | SYSTOLIC BLOOD PRESSURE: 126 MMHG | HEART RATE: 80 BPM | HEIGHT: 63 IN | BODY MASS INDEX: 43.41 KG/M2

## 2017-05-16 DIAGNOSIS — F60.3 BORDERLINE PERSONALITY DISORDER (HCC): ICD-10-CM

## 2017-05-16 DIAGNOSIS — F20.0 PARANOID SCHIZOPHRENIA (HCC): ICD-10-CM

## 2017-05-16 PROCEDURE — G8419 CALC BMI OUT NRM PARAM NOF/U: HCPCS | Performed by: PSYCHIATRY & NEUROLOGY

## 2017-05-16 PROCEDURE — G8432 DEP SCR NOT DOC, RNG: HCPCS | Performed by: PSYCHIATRY & NEUROLOGY

## 2017-05-16 PROCEDURE — 99213 OFFICE O/P EST LOW 20 MIN: CPT | Performed by: PSYCHIATRY & NEUROLOGY

## 2017-05-16 PROCEDURE — 1111F DSCHRG MED/CURRENT MED MERGE: CPT | Performed by: PSYCHIATRY & NEUROLOGY

## 2017-05-16 PROCEDURE — 1036F TOBACCO NON-USER: CPT | Performed by: PSYCHIATRY & NEUROLOGY

## 2017-05-16 NOTE — PROGRESS NOTES
"[facility]  PSYCHIATRIC FOLLOW-UP NOTE    Name: Kristin Balderrama  MRN: [id number]  : 1990  Age: 26 y.o.  Date of assessment: 2017  PCP: Torres Brody M.D.  Persons in attendance: Patient    REASON FOR VISIT/CHIEF COMPLAINT (as stated by Patient):  Kristin Balderrama is a 26 y.o., White female, attending follow-up appointment for   Chief Complaint   Patient presents with   • Schizophrenia     The patient is seen today for follow up after six months and her physical symptoms are getting worse.   .      HISTORY OF PRESENT ILLNESS:    This is a 25 yo female, single, disabled, lives with her grand mother, seen today for follow up after six months. The patient complained of physical symptoms. The patients grand father in ER and she came by her self for follow up. The patient is on Nasal oxygen and bladder catheter.    PSYCHOSOCIAL CHANGES SINCE PREVIOUS CONTACT:  The patient denied psychosis.     RESPONSE TO TREATMENT:  Fair.    MEDICATION SIDE EFFECTS:  Denied.    REVIEW OF SYSTEMS:        Constitutional positive - obesity, knee pain   Eyes negative   Ears/Nose/Mouth/Throat negative   Cardiovascular positive - hypertension   Respiratory positive - obstructive sleep apnea, oxygen therapy.   Gastrointestinal positive - fatty liver.   Genitourinary positive - balder spasm, chronic UTI, PCOS, supra pubic catheter   Muscular negative   Integumentary negative   Neurological positive - progressive focal motor weakness.   Endocrine positive - diabetes   Hematologic/Lymphatic negative       PSYCHIATRIC EXAMINATION/MENTAL STATUS  /78 mmHg  Pulse 80  Temp(Src) 36.7 °C (98.1 °F)  Resp 16  Ht 1.6 m (5' 2.99\")  Wt 111.131 kg (245 lb)  BMI 43.41 kg/m2  Participation: Active verbal participation  Grooming:Casual  Orientation: Alert and Fully Oriented  Eye contact: Good  Behavior:Calm   Mood: Anxious  Affect: Anxious  Thought process: Logical and Goal-directed  Thought content:  Within normal " limits  Speech: Rate within normal limits and Volume within normal limits  Perception:  Within normal limits  Memory:  Poor memory for chronology of events  Insight: Adequate  Judgment: Adequate  Family/couple interaction observations:   Other:    Current risk:    Suicide: Low   Homicide: Low   Self-harm: Low  Relapse: Low  Other:   Crisis Safety Plan reviewed?Yes  If evidence of imminent risk is present, intervention/plan:    Medical Records/Labs/Diagnostic Tests Reviewed: yes.    Medical Records/Labs/Diagnostic Tests Ordered: none.    DIAGNOSTIC IMPRESSION(S):  Schizophrenia  Borderline personality disorder.       ASSESSMENT AND PLAN:  Schizophrenia  Borderline personality disorder  Ziprasidone 80 mg two at night  Follow up in three months.     More than 50% of face-to-face time in this 30 minute visit was spent in psychotherapy/counseling.    Topics addressed include:    Ronny Burnette M.D.

## 2017-05-17 ENCOUNTER — TELEPHONE (OUTPATIENT)
Dept: MEDICAL GROUP | Facility: MEDICAL CENTER | Age: 27
End: 2017-05-17

## 2017-05-17 ENCOUNTER — RESOLUTE PROFESSIONAL BILLING HOSPITAL PROF FEE (OUTPATIENT)
Dept: HOSPITALIST | Facility: MEDICAL CENTER | Age: 27
End: 2017-05-17
Payer: MEDICARE

## 2017-05-17 ENCOUNTER — HOSPITAL ENCOUNTER (INPATIENT)
Facility: MEDICAL CENTER | Age: 27
LOS: 5 days | DRG: 880 | End: 2017-05-22
Attending: EMERGENCY MEDICINE | Admitting: HOSPITALIST
Payer: MEDICARE

## 2017-05-17 DIAGNOSIS — E06.3 HASHIMOTO'S ENCEPHALOPATHY: ICD-10-CM

## 2017-05-17 DIAGNOSIS — G93.49 HASHIMOTO'S ENCEPHALOPATHY: ICD-10-CM

## 2017-05-17 LAB
ALBUMIN SERPL BCP-MCNC: 4.9 G/DL (ref 3.2–4.9)
ALBUMIN/GLOB SERPL: 1.8 G/DL
ALP SERPL-CCNC: 88 U/L (ref 30–99)
ALT SERPL-CCNC: 41 U/L (ref 2–50)
ANION GAP SERPL CALC-SCNC: 13 MMOL/L (ref 0–11.9)
APTT PPP: 29.7 SEC (ref 24.7–36)
AST SERPL-CCNC: 20 U/L (ref 12–45)
BASOPHILS # BLD AUTO: 0.8 % (ref 0–1.8)
BASOPHILS # BLD: 0.05 K/UL (ref 0–0.12)
BILIRUB SERPL-MCNC: 0.6 MG/DL (ref 0.1–1.5)
BUN SERPL-MCNC: 9 MG/DL (ref 8–22)
CALCIUM SERPL-MCNC: 9.9 MG/DL (ref 8.5–10.5)
CHLORIDE SERPL-SCNC: 101 MMOL/L (ref 96–112)
CO2 SERPL-SCNC: 22 MMOL/L (ref 20–33)
CREAT SERPL-MCNC: 0.91 MG/DL (ref 0.5–1.4)
EOSINOPHIL # BLD AUTO: 0.14 K/UL (ref 0–0.51)
EOSINOPHIL NFR BLD: 2.2 % (ref 0–6.9)
ERYTHROCYTE [DISTWIDTH] IN BLOOD BY AUTOMATED COUNT: 41.9 FL (ref 35.9–50)
GFR SERPL CREATININE-BSD FRML MDRD: >60 ML/MIN/1.73 M 2
GLOBULIN SER CALC-MCNC: 2.8 G/DL (ref 1.9–3.5)
GLUCOSE BLD-MCNC: 158 MG/DL (ref 65–99)
GLUCOSE BLD-MCNC: 81 MG/DL (ref 65–99)
GLUCOSE SERPL-MCNC: 106 MG/DL (ref 65–99)
HCT VFR BLD AUTO: 42.2 % (ref 37–47)
HGB BLD-MCNC: 13.9 G/DL (ref 12–16)
IMM GRANULOCYTES # BLD AUTO: 0.05 K/UL (ref 0–0.11)
IMM GRANULOCYTES NFR BLD AUTO: 0.8 % (ref 0–0.9)
INR PPP: 0.93 (ref 0.87–1.13)
LYMPHOCYTES # BLD AUTO: 2.27 K/UL (ref 1–4.8)
LYMPHOCYTES NFR BLD: 35.1 % (ref 22–41)
MAGNESIUM SERPL-MCNC: 1.7 MG/DL (ref 1.5–2.5)
MCH RBC QN AUTO: 30.2 PG (ref 27–33)
MCHC RBC AUTO-ENTMCNC: 32.9 G/DL (ref 33.6–35)
MCV RBC AUTO: 91.5 FL (ref 81.4–97.8)
MONOCYTES # BLD AUTO: 0.59 K/UL (ref 0–0.85)
MONOCYTES NFR BLD AUTO: 9.1 % (ref 0–13.4)
NEUTROPHILS # BLD AUTO: 3.37 K/UL (ref 2–7.15)
NEUTROPHILS NFR BLD: 52 % (ref 44–72)
NRBC # BLD AUTO: 0 K/UL
NRBC BLD AUTO-RTO: 0 /100 WBC
PLATELET # BLD AUTO: 231 K/UL (ref 164–446)
PMV BLD AUTO: 11.2 FL (ref 9–12.9)
POTASSIUM SERPL-SCNC: 3.9 MMOL/L (ref 3.6–5.5)
PROT SERPL-MCNC: 7.7 G/DL (ref 6–8.2)
PROTHROMBIN TIME: 12.7 SEC (ref 12–14.6)
RBC # BLD AUTO: 4.61 M/UL (ref 4.2–5.4)
SODIUM SERPL-SCNC: 136 MMOL/L (ref 135–145)
T4 FREE SERPL-MCNC: 0.84 NG/DL (ref 0.53–1.43)
TSH SERPL DL<=0.005 MIU/L-ACNC: 2.6 UIU/ML (ref 0.3–3.7)
WBC # BLD AUTO: 6.5 K/UL (ref 4.8–10.8)

## 2017-05-17 PROCEDURE — 82962 GLUCOSE BLOOD TEST: CPT | Mod: 91

## 2017-05-17 PROCEDURE — 80053 COMPREHEN METABOLIC PANEL: CPT

## 2017-05-17 PROCEDURE — 84439 ASSAY OF FREE THYROXINE: CPT

## 2017-05-17 PROCEDURE — 700105 HCHG RX REV CODE 258: Performed by: HOSPITALIST

## 2017-05-17 PROCEDURE — 700102 HCHG RX REV CODE 250 W/ 637 OVERRIDE(OP): Performed by: HOSPITALIST

## 2017-05-17 PROCEDURE — 85610 PROTHROMBIN TIME: CPT

## 2017-05-17 PROCEDURE — A9270 NON-COVERED ITEM OR SERVICE: HCPCS | Performed by: HOSPITALIST

## 2017-05-17 PROCEDURE — 770006 HCHG ROOM/CARE - MED/SURG/GYN SEMI*

## 2017-05-17 PROCEDURE — 99285 EMERGENCY DEPT VISIT HI MDM: CPT

## 2017-05-17 PROCEDURE — 700111 HCHG RX REV CODE 636 W/ 250 OVERRIDE (IP): Performed by: HOSPITALIST

## 2017-05-17 PROCEDURE — 99223 1ST HOSP IP/OBS HIGH 75: CPT | Mod: AI | Performed by: HOSPITALIST

## 2017-05-17 PROCEDURE — 84443 ASSAY THYROID STIM HORMONE: CPT

## 2017-05-17 PROCEDURE — 83735 ASSAY OF MAGNESIUM: CPT

## 2017-05-17 PROCEDURE — 85730 THROMBOPLASTIN TIME PARTIAL: CPT

## 2017-05-17 PROCEDURE — 85025 COMPLETE CBC W/AUTO DIFF WBC: CPT

## 2017-05-17 RX ORDER — LORAZEPAM 1 MG/1
0.5 TABLET ORAL EVERY 6 HOURS PRN
Status: DISCONTINUED | OUTPATIENT
Start: 2017-05-17 | End: 2017-05-22 | Stop reason: HOSPADM

## 2017-05-17 RX ORDER — OXYCODONE AND ACETAMINOPHEN 10; 325 MG/1; MG/1
2 TABLET ORAL EVERY 6 HOURS PRN
COMMUNITY
End: 2017-07-06

## 2017-05-17 RX ORDER — POLYETHYLENE GLYCOL 3350 17 G/17G
1 POWDER, FOR SOLUTION ORAL
Status: DISCONTINUED | OUTPATIENT
Start: 2017-05-17 | End: 2017-05-22 | Stop reason: HOSPADM

## 2017-05-17 RX ORDER — ACETAMINOPHEN 325 MG/1
650 TABLET ORAL EVERY 6 HOURS PRN
Status: DISCONTINUED | OUTPATIENT
Start: 2017-05-17 | End: 2017-05-22 | Stop reason: HOSPADM

## 2017-05-17 RX ORDER — DEXTROSE MONOHYDRATE 25 G/50ML
25 INJECTION, SOLUTION INTRAVENOUS
Status: DISCONTINUED | OUTPATIENT
Start: 2017-05-17 | End: 2017-05-22 | Stop reason: HOSPADM

## 2017-05-17 RX ORDER — PROMETHAZINE HYDROCHLORIDE 25 MG/1
12.5-25 SUPPOSITORY RECTAL EVERY 4 HOURS PRN
Status: DISCONTINUED | OUTPATIENT
Start: 2017-05-17 | End: 2017-05-22 | Stop reason: HOSPADM

## 2017-05-17 RX ORDER — FLUOXETINE 10 MG/1
10 CAPSULE ORAL DAILY
Status: DISCONTINUED | OUTPATIENT
Start: 2017-05-17 | End: 2017-05-22 | Stop reason: HOSPADM

## 2017-05-17 RX ORDER — HEPARIN SODIUM 5000 [USP'U]/ML
5000 INJECTION, SOLUTION INTRAVENOUS; SUBCUTANEOUS EVERY 8 HOURS
Status: DISCONTINUED | OUTPATIENT
Start: 2017-05-17 | End: 2017-05-22 | Stop reason: HOSPADM

## 2017-05-17 RX ORDER — BACLOFEN 10 MG/1
10 TABLET ORAL 3 TIMES DAILY
Status: DISCONTINUED | OUTPATIENT
Start: 2017-05-17 | End: 2017-05-22 | Stop reason: HOSPADM

## 2017-05-17 RX ORDER — BISACODYL 10 MG
10 SUPPOSITORY, RECTAL RECTAL
Status: DISCONTINUED | OUTPATIENT
Start: 2017-05-17 | End: 2017-05-22 | Stop reason: HOSPADM

## 2017-05-17 RX ORDER — ONDANSETRON 4 MG/1
4 TABLET, ORALLY DISINTEGRATING ORAL EVERY 4 HOURS PRN
Status: DISCONTINUED | OUTPATIENT
Start: 2017-05-17 | End: 2017-05-22 | Stop reason: HOSPADM

## 2017-05-17 RX ORDER — LEVOTHYROXINE SODIUM 0.07 MG/1
75 TABLET ORAL
Status: DISCONTINUED | OUTPATIENT
Start: 2017-05-18 | End: 2017-05-22 | Stop reason: HOSPADM

## 2017-05-17 RX ORDER — ZIPRASIDONE HYDROCHLORIDE 40 MG/1
160 CAPSULE ORAL EVERY EVENING
Status: DISCONTINUED | OUTPATIENT
Start: 2017-05-17 | End: 2017-05-22 | Stop reason: HOSPADM

## 2017-05-17 RX ORDER — LABETALOL HYDROCHLORIDE 5 MG/ML
10 INJECTION, SOLUTION INTRAVENOUS EVERY 4 HOURS PRN
Status: DISCONTINUED | OUTPATIENT
Start: 2017-05-17 | End: 2017-05-22 | Stop reason: HOSPADM

## 2017-05-17 RX ORDER — LORAZEPAM 2 MG/ML
0.5 INJECTION INTRAMUSCULAR EVERY 6 HOURS PRN
Status: DISCONTINUED | OUTPATIENT
Start: 2017-05-17 | End: 2017-05-22 | Stop reason: HOSPADM

## 2017-05-17 RX ORDER — SODIUM CHLORIDE 9 MG/ML
INJECTION, SOLUTION INTRAVENOUS CONTINUOUS
Status: ACTIVE | OUTPATIENT
Start: 2017-05-17 | End: 2017-05-18

## 2017-05-17 RX ORDER — OXYCODONE AND ACETAMINOPHEN 10; 325 MG/1; MG/1
2 TABLET ORAL EVERY 6 HOURS PRN
Status: DISCONTINUED | OUTPATIENT
Start: 2017-05-17 | End: 2017-05-22 | Stop reason: HOSPADM

## 2017-05-17 RX ORDER — PROMETHAZINE HYDROCHLORIDE 25 MG/1
12.5-25 TABLET ORAL EVERY 4 HOURS PRN
Status: DISCONTINUED | OUTPATIENT
Start: 2017-05-17 | End: 2017-05-22 | Stop reason: HOSPADM

## 2017-05-17 RX ORDER — GABAPENTIN 600 MG/1
600 TABLET ORAL 3 TIMES DAILY
Status: DISCONTINUED | OUTPATIENT
Start: 2017-05-17 | End: 2017-05-17

## 2017-05-17 RX ORDER — FENTANYL 25 UG/1
1 PATCH TRANSDERMAL
Status: DISCONTINUED | OUTPATIENT
Start: 2017-05-17 | End: 2017-05-22 | Stop reason: HOSPADM

## 2017-05-17 RX ORDER — NITROFURANTOIN 25; 75 MG/1; MG/1
100 CAPSULE ORAL 2 TIMES DAILY WITH MEALS
Status: COMPLETED | OUTPATIENT
Start: 2017-05-17 | End: 2017-05-22

## 2017-05-17 RX ORDER — ZOLPIDEM TARTRATE 5 MG/1
5 TABLET ORAL
Status: DISCONTINUED | OUTPATIENT
Start: 2017-05-17 | End: 2017-05-22 | Stop reason: HOSPADM

## 2017-05-17 RX ORDER — ONDANSETRON 2 MG/ML
4 INJECTION INTRAMUSCULAR; INTRAVENOUS EVERY 4 HOURS PRN
Status: DISCONTINUED | OUTPATIENT
Start: 2017-05-17 | End: 2017-05-22 | Stop reason: HOSPADM

## 2017-05-17 RX ORDER — AMOXICILLIN 250 MG
2 CAPSULE ORAL 2 TIMES DAILY
Status: DISCONTINUED | OUTPATIENT
Start: 2017-05-17 | End: 2017-05-22 | Stop reason: HOSPADM

## 2017-05-17 RX ADMIN — SODIUM CHLORIDE: 9 INJECTION, SOLUTION INTRAVENOUS at 14:37

## 2017-05-17 RX ADMIN — OXYCODONE HYDROCHLORIDE AND ACETAMINOPHEN 2 TABLET: 10; 325 TABLET ORAL at 15:10

## 2017-05-17 RX ADMIN — STANDARDIZED SENNA CONCENTRATE AND DOCUSATE SODIUM 2 TABLET: 8.6; 5 TABLET, FILM COATED ORAL at 21:12

## 2017-05-17 RX ADMIN — INSULIN LISPRO 1 UNITS: 100 INJECTION, SOLUTION INTRAVENOUS; SUBCUTANEOUS at 21:08

## 2017-05-17 RX ADMIN — FLUOXETINE 10 MG: 10 CAPSULE ORAL at 16:45

## 2017-05-17 RX ADMIN — HEPARIN SODIUM 5000 UNITS: 5000 INJECTION, SOLUTION INTRAVENOUS; SUBCUTANEOUS at 14:19

## 2017-05-17 RX ADMIN — BACLOFEN 10 MG: 10 TABLET ORAL at 21:11

## 2017-05-17 RX ADMIN — BACLOFEN 10 MG: 10 TABLET ORAL at 14:42

## 2017-05-17 RX ADMIN — HEPARIN SODIUM 5000 UNITS: 5000 INJECTION, SOLUTION INTRAVENOUS; SUBCUTANEOUS at 21:10

## 2017-05-17 RX ADMIN — FENTANYL 1 PATCH: 25 PATCH, EXTENDED RELEASE TRANSDERMAL at 16:43

## 2017-05-17 RX ADMIN — NITROFURANTOIN MONOHYDRATE AND NITROFURANTOIN MACROCRYSTALLINE 100 MG: 75; 25 CAPSULE ORAL at 21:11

## 2017-05-17 RX ADMIN — ZIPRASIDONE HCL 160 MG: 40 CAPSULE ORAL at 16:44

## 2017-05-17 RX ADMIN — SODIUM CHLORIDE 1000 MG: 9 INJECTION, SOLUTION INTRAVENOUS at 16:42

## 2017-05-17 ASSESSMENT — LIFESTYLE VARIABLES
DO YOU DRINK ALCOHOL: NO
DO YOU DRINK ALCOHOL: NO

## 2017-05-17 ASSESSMENT — PAIN SCALES - GENERAL
PAINLEVEL_OUTOF10: 4
PAINLEVEL_OUTOF10: 8
PAINLEVEL_OUTOF10: 4

## 2017-05-17 ASSESSMENT — PAIN SCALES - WONG BAKER: WONGBAKER_NUMERICALRESPONSE: HURTS JUST A LITTLE BIT

## 2017-05-17 NOTE — ED NOTES
The Medication Reconciliation process has been completed by interviewing the patient and mom    Allergies have been reviewed and verified      Home Pharmacy: [facility] [place]

## 2017-05-17 NOTE — TELEPHONE ENCOUNTER
Future Appointments       Provider Department Center    5/23/2017 8:20 AM Torres Brody M.D. [facility] [address]    5/31/2017 9:00 AM [facility]    6/6/2017 1:30 PM RN 2 Infusion Services [facility]    7/17/2017 10:00 AM [facility] [place]    7/18/2017 9:40 AM Torres Brody M.D. [facility] [address]    7/19/2017 8:40 AM Torres Kumar M.D. [facility]     8/11/2017 10:00 AM Sandoval Fox M.D. [facility] Neurology         ESTABLISHED PATIENT PRE-VISIT PLANNING     Note: Patient will not be contacted if there is no indication to call.     1.  Reviewed note from last office visit with PCP and/or other med group provider: Yes    2.  If any orders were placed at last visit, do we have Results/Consult Notes?        •  Labs - Labs were not ordered at last office visit.       •  Imaging - Imaging was not ordered at last office visit.       •  Referrals - No referrals were ordered at last office visit.    3.  Immunizations were updated in Epic using WebIZ?: Yes       •  Web Iz Recommendations: HEPATITIS A     4.  Patient is due for the following Health Maintenance Topics:   Health Maintenance Due   Topic Date Due   • DIABETES MONOFILAMENT / LE EXAM  04/13/1990   • IMM HEP A VACCINE (1 of 2 - Standard Series) 10/13/1990   • IMM VARICELLA (CHICKENPOX) VACCINE (1 of 2 - 2 Dose Adolescent Series) 10/13/2002   • RETINAL SCREENING  10/13/2007   • URINE ACR / MICROALBUMIN  10/13/2007           5.  Patient was not informed to arrive 15 min prior to their scheduled appointment and bring in their medication bottles.

## 2017-05-17 NOTE — IP AVS SNAPSHOT
" Home Care Instructions                                                                                                                  Name:Kristin Balderrama  Medical Record Number:[id number]  CSN: [id number]    YOB: 1990   Age: 26 y.o.  Sex: female  HT:1.6 m (5' 3\") WT: 121.8 kg (268 lb 8.3 oz)          Admit Date: 5/17/2017     Discharge Date:   Today's Date: 5/22/2017  Attending Doctor:  Noman Bruno M.D.                  Allergies:  Cefdinir; Depakote; Abilify; Amitriptyline; Amoxicillin; Ciprofloxacin; Clindamycin; Doxycycline; Ees; Flagyl; Flomax; Metformin; Sulfa drugs; Tape; Vancomycin; Cephalexin; Erythromycin; Levofloxacin; Metronidazole; and Valproic acid            Discharge Instructions       Discharge Instructions    Discharged to home by car with relative. Discharged via wheelchair, hospital escort: Yes.  Special equipment needed: Not Applicable    Be sure to schedule a follow-up appointment with your primary care doctor or any specialists as instructed.     Discharge Plan:   Diet Plan: Discussed  Activity Level: Discussed  Confirmed Follow up Appointment: Appointment Scheduled  Confirmed Symptoms Management: Discussed  Medication Reconciliation Updated: Yes  Influenza Vaccine Indication: Indicated: Not available from distributor/    I understand that a diet low in cholesterol, fat, and sodium is recommended for good health. Unless I have been given specific instructions below for another diet, I accept this instruction as my diet prescription.   Other diet:     Special Instructions: None    · Is patient discharged on Warfarin / Coumadin?   No     · Is patient Post Blood Transfusion?  No    Depression / Suicide Risk    As you are discharged from this [facility] facility, it is important to learn how to keep safe from harming yourself.    Recognize the warning signs:  · Abrupt changes in personality, positive or negative- including increase in energy   · Giving away " possessions  · Change in eating patterns- significant weight changes-  positive or negative  · Change in sleeping patterns- unable to sleep or sleeping all the time   · Unwillingness or inability to communicate  · Depression  · Unusual sadness, discouragement and loneliness  · Talk of wanting to die  · Neglect of personal appearance   · Rebelliousness- reckless behavior  · Withdrawal from people/activities they love  · Confusion- inability to concentrate     If you or a loved one observes any of these behaviors or has concerns about self-harm, here's what you can do:  · Talk about it- your feelings and reasons for harming yourself  · Remove any means that you might use to hurt yourself (examples: pills, rope, extension cords, firearm)  · Get professional help from the community (Mental Health, Substance Abuse, psychological counseling)  · Do not be alone:Call your Safe Contact- someone whom you trust who will be there for you.  · Call your local CRISIS HOTLINE 691-5695 or 571-763-4061  · Call your local Children's Mobile Crisis Response Team [place] (388) 891-7289 or wwwScyron  · Call the toll free National Suicide Prevention Hotlines   · National Suicide Prevention Lifeline 031-012-WYTD (6646)  · National Hope Line Network 800-SUICIDE (011-0754)        Your appointments     May 23, 2017  8:20 AM   Established Patient with Torres Brody M.D.   [facility] [place] [facility] [address]    [address]  [address]  [place] [id number] [phone]           You will be receiving a confirmation call a few days before your appointment from our automated call confirmation system.            May 31, 2017  9:00 AM   Individual Therapy with Blanca Brantley L.C.S.W.   [facility]    [address]  [address]  [place] [address] [phone]            Jun 06, 2017  1:30 PM   EST MISC Infusion 2 HR with RN 2   Infusion Services ([facility])    [address] [facility] [address]  [place]  [phone]   [phone]            Jul 17, 2017 10:00 AM   Individual Therapy with Blanca Brantley L.C.S.W.   [facility]    [address] [facility]  [address]  [facility] [id number] [phone]            Jul 18, 2017  9:40 AM   Established Patient with Torres Brody M.D.   [facility] [address]    [address] [facility]  [address]  [facility] [id number] [phone]           You will be receiving a confirmation call a few days before your appointment from our automated call confirmation system.            Jul 19, 2017  8:40 AM   FOLLOW UP with Torres Kumar M.D.   [facility] (--)    [address], [address]  [place] [id number] [phone]            Aug 11, 2017 10:00 AM   Follow Up Visit with Sandoval Fox M.D.   [facility] (--)    [address] [facility], [address]  [facility] [id number] [phone]           You will be receiving a confirmation call a few days before your appointment from our automated call confirmation system.                 Discharge Medication Instructions:    Below are the medications your physician expects you to take upon discharge:    Review all your home medications and newly ordered medications with your doctor and/or pharmacist. Follow medication instructions as directed by your doctor and/or pharmacist.    Please keep your medication list with you and share with your physician.               Medication List      CONTINUE taking these medications        Instructions    Morning Afternoon Evening Bedtime    albuterol 108 (90 BASE) MCG/ACT Aers inhalation aerosol        Inhale 2 Puffs by mouth every 6 hours as needed for Shortness of Breath.   Dose:  2 Puff                        aspirin EC 81 MG Tbec   Last time this was given:  81 mg on 5/22/2017  7:34 AM   Commonly known as:  ECOTRIN        Take 1 Tab by mouth every day.   Dose:  81 mg                        baclofen 10 MG Tabs   Last time this  was given:  10 mg on 5/22/2017  5:37 AM   Commonly known as:  LIORESAL        Take 1 Tab by mouth 3 times a day.   Dose:  10 mg                        CRANBERRY PO        Take 100,400 mg by mouth 2 times a day.   Dose:  331166 mg                        fentanyl 25 MCG/HR Pt72   Last time this was given:  1 Patch on 5/20/2017  1:29 PM   Commonly known as:  DURAGESIC        Apply 1 Patch to skin as directed every 72 hours.   Dose:  1 Patch                        fluoxetine 10 MG Caps   Last time this was given:  10 mg on 5/22/2017  7:35 AM   Commonly known as:  PROZAC        TAKE ONE CAPSULE BY MOUTH ONCE A DAY                        HM VITAMIN B12 500 MCG tablet   Generic drug:  cyanocobalamin        Take 1,000 mcg by mouth 2 times a day.   Dose:  1000 mcg                        ivabradine 5 MG Tabs tablet   Last time this was given:  5 mg on 5/22/2017  7:30 AM   Commonly known as:  CORLANOR        Take 1 Tab by mouth 2 times a day, with meals.   Dose:  5 mg                        lactulose 10 GM/15ML Soln        Take 10 g by mouth 2 times a day as needed.   Dose:  10 g                        levothyroxine 75 MCG Tabs   Last time this was given:  75 mcg on 5/22/2017  5:37 AM   Commonly known as:  SYNTHROID        Take 75 mcg by mouth every morning.   Dose:  75 mcg                        liraglutide 18 MG/3ML Sopn injection   Last time this was given:  0.6 mg on 5/22/2017  7:36 AM   Commonly known as:  VICTOZA        Inject 0.3 mL as instructed every day.   Dose:  1.8 mg                        Melatonin 5 MG Tabs        Doctor's comments:  Takes two tabs at Bedtime (10 mg.)   Take 10 mg by mouth every bedtime.   Dose:  10 mg                        nitroglycerin 0.4 MG Subl   Commonly known as:  NITROSTAT        Place 1 Tab under tongue as needed for Chest Pain.   Dose:  0.4 mg                        omeprazole 20 MG delayed-release capsule   Commonly known as:  PRILOSEC        Take 20 mg by mouth 2 times a day.      Dose:  20 mg                        oxycodone-acetaminophen  MG Tabs   Last time this was given:  2 Tabs on 5/22/2017 11:09 AM   Commonly known as:  PERCOCET-10        Take 2 Tabs by mouth every 6 hours as needed for Severe Pain.   Dose:  2 Tab                        promethazine 25 MG Tabs   Commonly known as:  PHENERGAN        Take 1 Tab by mouth every 6 hours as needed for Nausea/Vomiting.   Dose:  25 mg                        ranitidine 150 MG Tabs   Commonly known as:  ZANTAC        Take 150 mg by mouth 2 times a day.   Dose:  150 mg                        sodium bicarbonate 325 MG Tabs   Last time this was given:  650 mg on 5/22/2017  7:38 AM        Take 2 Tabs by mouth 3 times a day.   Dose:  650 mg                        vitamin D (Ergocalciferol) 34126 UNITS Caps capsule   Commonly known as:  DRISDOL        Take 50,000 Units by mouth every Friday.   Dose:  10712 Units                        ziprasidone 80 MG Caps   Last time this was given:  160 mg on 5/21/2017  5:38 PM   Commonly known as:  GEODON        Take 160 mg by mouth every evening. Takes this at 1700 daily   Dose:  160 mg                          STOP taking these medications     furosemide 20 MG Tabs   Commonly known as:  LASIX                       Instructions           Diet / Nutrition:    Follow any diet instructions given to you by your doctor or the dietician, including how much salt (sodium) you are allowed each day.    If you are overweight, talk to your doctor about a weight reduction plan.    Activity:    Remain physically active following your doctor's instructions about exercise and activity.    Rest often.     Any time you become even a little tired or short of breath, SIT DOWN and rest.    Worsening Symptoms:    Report any of the following signs and symptoms to the doctor's office immediately:    *Pain of jaw, arm, or neck  *Chest pain not relieved by medication                               *Dizziness or loss of  consciousness  *Difficulty breathing even when at rest   *More tired than usual                                       *Bleeding drainage or swelling of surgical site  *Swelling of feet, ankles, legs or stomach                 *Fever (>100ºF)  *Pink or blood tinged sputum  *Weight gain (3lbs/day or 5lbs /week)           *Shock from internal defibrillator (if applicable)  *Palpitations or irregular heartbeats                *Cool and/or numb extremities    Stroke Awareness    Common Risk Factors for Stroke include:    Age  Atrial Fibrillation  Carotid Artery Stenosis  Diabetes Mellitus  Excessive alcohol consumption  High blood pressure  Overweight   Physical inactivity  Smoking    Warning signs and symptoms of a stroke include:    *Sudden numbness or weakness of the face, arm or leg (especially on one side of the body).  *Sudden confusion, trouble speaking or understanding.  *Sudden trouble seeing in one or both eyes.  *Sudden trouble walking, dizziness, loss of balance or coordination.Sudden severe headache with no known cause.    It is very important to get treatment quickly when a stroke occurs. If you experience any of the above warning signs, call 911 immediately.                   Disclaimer         Quit Smoking / Tobacco Use:    I understand the use of any tobacco products increases my chance of suffering from future heart disease or stroke and could cause other illnesses which may shorten my life. Quitting the use of tobacco products is the single most important thing I can do to improve my health. For further information on smoking / tobacco cessation call a Toll Free Quit Line at 1-507.848.7779 (*National [employer] [place]) or 1-721.411.1529 (American Lung Association) or you can access the web based program at www.lungusa.org.    [place] Tobacco Users Help Line:  (759) 954-4339       Toll Free: 1-980.374.9355  Quit Tobacco Program [facility] (050)649-9625    Crisis Hotline:    National  Crisis Hotline:  1-218-PCICMNQ or 1-718.249.2068    [place] Crisis Hotline:    1-743.547.8326 or 546-708-2994    Discharge Survey:   Thank you for choosing [facility]. We hope we did everything we could to make your hospital stay a pleasant one. You may be receiving a phone survey and we would appreciate your time and participation in answering the questions. Your input is very valuable to us in our efforts to improve our service to our patients and their families.        My signature on this form indicates that:    1. I have reviewed and understand the above information.  2. My questions regarding this information have been answered to my satisfaction.  3. I have formulated a plan with my discharge nurse to obtain my prescribed medications for home.                  Disclaimer         __________________________________                     __________       ________                       Patient Signature                                                 Date                    Time

## 2017-05-17 NOTE — PROGRESS NOTES
Completed 2 RN skin assessment, no issues noted just some bruising on lower leg and scratches on back

## 2017-05-17 NOTE — ED PROVIDER NOTES
"ED Provider Note    CHIEF COMPLAINT  Chief Complaint   Patient presents with   • T-5000 FALL     back pain   • Extremity Weakness     ble unable to stand weight.        HPI  Kristin Balderrama is a 26 y.o. female who presents for evaluation of back pain and extremity weakness.  The patient's been evaluated and worked up extensively for acute paralysis.  She's been followed by Dr. Stevenson.  The patient states her last day she is lost strength and sensation in her lower extremities.  She states she is fallen 3 times because of the weakness in her legs.  The patient's workups in the past have not revealed a definitive cause for her symptoms.  The patient has been treated for recent urinary tract infection.  She is receiving IV Solu-Medrol on a weekly basis and states that she received last week.  Patient's had no recent: Fever, chills, URI symptoms, cardiorespiratory symptoms, gastrointestinal symptoms.  No other acute symptomatology or complaints.    REVIEW OF SYSTEMS  See HPI for further details. All other systems negative.    PAST MEDICAL HISTORY  Past Medical History   Diagnosis Date   • Scoliosis    • Multiple personality disorder    • Chronic UTI 9/18/2010   • Heart burn    • Pain 08-15-12     back, 7/10   • History of falling    • Sinus tachycardia 10/31/2013   • Urinary incontinence    • Hypertension    • Disorder of thyroid    • Obesity    • Pneumonia 2012   • ASTHMA      when around smoke   • Breath shortness      with tachycardia   • Anginal syndrome      random chest pain especially with tachycardia   • Psychosis    • Arthritis      osteo   • PCOS (polycystic ovarian syndrome)    • Gynecological disorder      PCOS   • Renal disorder      \"kidney disease, stage 1\" nephrologist, Dr. Vallejo   • Arrhythmia      \"sinus tachycardia\", cariologist, Dr. 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   • Sleep apnea      CPAP " "\"pulmonary doctor took me off mid year 2016\"   • Mitochondrial disease    • Fall        FAMILY HISTORY  Family History   Problem Relation Age of Onset   • Hypertension Mother    • Sleep Apnea Mother    • Heart Disease Mother    • Other Mother      hypothryod   • Hypertension Maternal Uncle    • Heart Disease Maternal Grandmother    • Hypertension Maternal Grandmother    • Other Sister      Narcolepsy;fibromyalsia;bone;nerve   • Genitourinary () Sister      endometriosis       SOCIAL HISTORY  Denies tobacco, alcohol, drug;    SURGICAL HISTORY  Past Surgical History   Procedure Laterality Date   • Neuro dest facet l/s w/ig sngl  4/21/2015     Performed by Reza Tabor at SURGERY SUR[facility]ICAL [facility]   • Recovery  1/27/2016     Procedure: CATH LAB EP STUDY WITH SINUS NODE MODIFICATION ABHINAV;  Surgeon: [facility];  Location: SURGERY PRE-POST PROC UNIT [facility];  Service:    • Katie by laparoscopy  8/29/2010     Performed by SHAYY JOHNS at SURGERY [facility]   • Lumbar fusion anterior  8/21/2012     Performed by SUSIE SAWANT at SURGERY [facility]   • Other cardiac surgery  1/2016     cardiac ablation   • Tonsillectomy       tonsillectomy   • Bowel stimulator insertion  7/13/2016     Procedure: BOWEL STIMULATOR INSERTION FOR PERMANENT INTERSTIM SACRAL IMPLANT;  Surgeon: Joe Noyola M.D.;  Location: SURGERY [facility];  Service:    • Gastroscopy with balloon dilatation N/A 1/4/2017     Procedure: GASTROSCOPY WITH DILATATION;  Surgeon: Torres Vargas M.D.;  Location: [facility];  Service:    • Muscle biopsy Right 1/26/2017     Procedure: MUSCLE BIOPSY - THIGH;  Surgeon: Isidro Vigil M.D.;  Location: SURGERY [facility];  Service:    • Other abdominal surgery         CURRENT MEDICATIONS  See nurses notes    ALLERGIES  Allergies   Allergen Reactions   • Cefdinir Shortness of Breath and Itching     Tolerated 1/18/17   • Depakote [Divalproex Sodium] Unspecified     Muscle " "spasms/muscle pain and weakness     • Abilify Unspecified     Headaches/muscle twitching     • Amitriptyline Unspecified     Headaches     • Amoxicillin Rash     Pt states \"I get a rash\".     • Ciprofloxacin Rash     Pt states \"I get a rash\".     • Clindamycin Nausea     Even with food     • Doxycycline Vomiting and Nausea     RXN=unknown   • Ees [Erythromycin] Vomiting and Nausea   • Flagyl [Metronidazole Hcl] Unspecified     \"eye problems\"     • Flomax [Tamsulosin Hydrochloride] Swelling   • Metformin Unspecified     Increased lactic acid      • Sulfa Drugs Hives and Rash     RXN=since childhood   • Tape Rash     Tears skin off   coban with Tegaderm tape ok  RXN=ongoing   • Vancomycin Itching     Pt becomes flushed in face and chest.   RXN=7/10/16   • Cephalexin [Keflex] Rash     Pt states she gets a rash with this medication   • Erythromycin    • Levofloxacin Unspecified     Leg muscle cramps   • Metronidazole    • Valproic Acid        PHYSICAL EXAM  VITAL SIGNS: /89 mmHg  Pulse 80  Temp(Src) 36.4 °C (97.6 °F)  Resp 16  Wt 111.131 kg (245 lb)  SpO2 99%   Constitutional: 26-year-old female, obese, awake, anxious, oriented ×3  HENT: ,Atraumatic, Bilateral external ears normal, tympanic membranes clear, Oropharynx moist, No oral exudates, Nose normal.   Eyes: PERRL, EOMI, Conjunctiva normal, No discharge.   Neck: Normal range of motion, No tenderness, Supple, No stridor.   Lymphatic: No lymphadenopathy noted.   Cardiovascular: Normal heart rate, Normal rhythm, No murmurs, No rubs, No gallops.   Thorax & Lungs: Normal Equal breath sounds, No respiratory distress, No wheezing, no stridor, no rales. No chest tenderness.   Abdomen: Soft, nontender, nondistended, no organomegaly, positive bowel sounds normal in quality. No guarding or rebound.  Skin: Good skin turgor, pink, warm, dry. No rashes, petechiae, purpura. Normal capillary refill.   Back: No tenderness, No CVA tenderness.   Extremities: Intact distal " pulses, No edema, No tenderness, No cyanosis, No clubbing. Vascular: Pulses are 2+, symmetric in the upper and lower extremities.  Musculoskeletal: Good range of motion in all major joints. No tenderness to palpation or major deformities noted.   Neurologic: Alert & oriented x 3, strength is mildly weak in the upper extremities but is symmetric; the patient states she is unavailable move her lower extremities or feel any sensation;    RADIOLOGY/PROCEDURES  Previous imaging studies were reviewed    COURSE & MEDICAL DECISION MAKING  Pertinent Labs & Imaging studies reviewed. (See chart for details)  1.  Monitor  2.  IV normal saline    Laboratory studies: CBC and differential within normal limits; coags within normal limits; CMP within normal limits;    Discussion/consultation: At this time, the patient presents with weakness and lower extremities.  Neurology, Dr. Stevenson, felt the patient had Hashimoto's encephalitis.  I spoke with the hospitalist on-call.  The patient will be admitted for further monitoring, treatment, and care.    FINAL IMPRESSION  1. Hashimoto's encephalopathy           PLAN  1.  The patient will be admitted for further monitoring, treatment, and care.    Electronically signed by: Guy G Gansert, 5/17/2017 10:10 AM

## 2017-05-17 NOTE — IP AVS SNAPSHOT
" <p align=\"LEFT\"><IMG SRC=\"//EMRWB/blob$/Images/[email or web address]\" alt=\"Image\" WIDTH=\"50%\" HEIGHT=\"200\" BORDER=\"\"></p>                   Name:Kristin Balderrama  Medical Record Number:[id number]  CSN: [id number]    YOB: 1990   Age: 26 y.o.  Sex: female  HT:1.6 m (5' 3\") WT: 121.8 kg (268 lb 8.3 oz)          Admit Date: 5/17/2017     Discharge Date:   Today's Date: 5/22/2017  Attending Doctor:  Noman Bruno M.D.                  Allergies:  Cefdinir; Depakote; Abilify; Amitriptyline; Amoxicillin; Ciprofloxacin; Clindamycin; Doxycycline; Ees; Flagyl; Flomax; Metformin; Sulfa drugs; Tape; Vancomycin; Cephalexin; Erythromycin; Levofloxacin; Metronidazole; and Valproic acid          Your appointments     May 23, 2017  8:20 AM   Established Patient with Torres Brody M.D.   [facility] [address]    [address] [facility] [address]  [facility] [address] [phone]           You will be receiving a confirmation call a few days before your appointment from our automated call confirmation system.            May 31, 2017  9:00 AM   Individual Therapy with Blanca Brantley L.C.S.W.   [facility]    [address]  [address]  [facility] [id number] [phone]            Jun 06, 2017  1:30 PM   EST MISC Infusion 2 HR with RN 2   Infusion Services ([facility])    [address] [facility] [address]  [place] [address] [phone]            Jul 17, 2017 10:00 AM   Individual Therapy with Blanca Brantley L.C.S.W.   [facility]    [address]  [address]  [facility] [id number] [phone]            Jul 18, 2017  9:40 AM   Established Patient with Torres Brody M.D.   [facility] [address]    [address]  [facility] [address]   [phone]           You will be receiving a confirmation call a few days before your appointment from our automated call confirmation system.            Jul 19, 2017  8:40 AM   FOLLOW UP with Torres Kumar M.D.  "   [facility] (--)    [address], [address]  [place] [id number] [phone]            Aug 11, 2017 10:00 AM   Follow Up Visit with Sandoval Fox M.D.   [facility] (--)    [address], [address]  [place] [id number] [phone]           You will be receiving a confirmation call a few days before your appointment from our automated call confirmation system.                 Medication List      Take these Medications        Instructions    albuterol 108 (90 BASE) MCG/ACT Aers inhalation aerosol    Inhale 2 Puffs by mouth every 6 hours as needed for Shortness of Breath.   Dose:  2 Puff       aspirin EC 81 MG Tbec   Commonly known as:  ECOTRIN    Take 1 Tab by mouth every day.   Dose:  81 mg       baclofen 10 MG Tabs   Commonly known as:  LIORESAL    Take 1 Tab by mouth 3 times a day.   Dose:  10 mg       CRANBERRY PO    Take 100,400 mg by mouth 2 times a day.   Dose:  791581 mg       fentanyl 25 MCG/HR Pt72   Commonly known as:  DURAGESIC    Apply 1 Patch to skin as directed every 72 hours.   Dose:  1 Patch       fluoxetine 10 MG Caps   Commonly known as:  PROZAC    TAKE ONE CAPSULE BY MOUTH ONCE A DAY       HM VITAMIN B12 500 MCG tablet   Generic drug:  cyanocobalamin    Take 1,000 mcg by mouth 2 times a day.   Dose:  1000 mcg       ivabradine 5 MG Tabs tablet   Commonly known as:  CORLANOR    Take 1 Tab by mouth 2 times a day, with meals.   Dose:  5 mg       lactulose 10 GM/15ML Soln    Take 10 g by mouth 2 times a day as needed.   Dose:  10 g       levothyroxine 75 MCG Tabs   Commonly known as:  SYNTHROID    Take 75 mcg by mouth every morning.   Dose:  75 mcg       liraglutide 18 MG/3ML Sopn injection   Commonly known as:  VICTOZA    Inject 0.3 mL as instructed every day.   Dose:  1.8 mg       Melatonin 5 MG Tabs    Doctor's comments:  Takes two tabs at Bedtime (10 mg.)   Take 10 mg by mouth every bedtime.   Dose:  10 mg       nitroglycerin  0.4 MG Subl   Commonly known as:  NITROSTAT    Place 1 Tab under tongue as needed for Chest Pain.   Dose:  0.4 mg       omeprazole 20 MG delayed-release capsule   Commonly known as:  PRILOSEC    Take 20 mg by mouth 2 times a day.   Dose:  20 mg       oxycodone-acetaminophen  MG Tabs   Commonly known as:  PERCOCET-10    Take 2 Tabs by mouth every 6 hours as needed for Severe Pain.   Dose:  2 Tab       promethazine 25 MG Tabs   Commonly known as:  PHENERGAN    Take 1 Tab by mouth every 6 hours as needed for Nausea/Vomiting.   Dose:  25 mg       ranitidine 150 MG Tabs   Commonly known as:  ZANTAC    Take 150 mg by mouth 2 times a day.   Dose:  150 mg       sodium bicarbonate 325 MG Tabs    Take 2 Tabs by mouth 3 times a day.   Dose:  650 mg       vitamin D (Ergocalciferol) 68127 UNITS Caps capsule   Commonly known as:  DRISDOL    Take 50,000 Units by mouth every Friday.   Dose:  58896 Units       ziprasidone 80 MG Caps   Commonly known as:  GEODON    Take 160 mg by mouth every evening. Takes this at 1700 daily   Dose:  160 mg

## 2017-05-17 NOTE — IP AVS SNAPSHOT
Solutionary Access Code: Activation code not generated  Current Solutionary Status: Active    [employer]  A secure, online tool to manage your health information     [employer]’s Solutionary® is a secure, online tool that connects you to your personalized health information from the privacy of your home -- day or night - making it very easy for you to manage your healthcare. Once the activation process is completed, you can even access your medical information using the Solutionary rocio, which is available for free in the Apple Rocio store or Google Play store.     Solutionary provides the following levels of access (as shown below):   My Chart Features   [facility] Primary Care Doctor [facility]  Specialists [facility]  Urgent  Care Non-[facility]  Primary Care  Doctor   Email your healthcare team securely and privately 24/7 X X X X   Manage appointments: schedule your next appointment; view details of past/upcoming appointments X      Request prescription refills. X      View recent personal medical records, including lab and immunizations X X X X   View health record, including health history, allergies, medications X X X X   Read reports about your outpatient visits, procedures, consult and ER notes X X X X   See your discharge summary, which is a recap of your hospital and/or ER visit that includes your diagnosis, lab results, and care plan. X X       How to register for Solutionary:  1. Go to  [email or web address].  2. Click on the Sign Up Now box, which takes you to the New Member Sign Up page. You will need to provide the following information:  a. Enter your Solutionary Access Code exactly as it appears at the top of this page. (You will not need to use this code after you’ve completed the sign-up process. If you do not sign up before the expiration date, you must request a new code.)   b. Enter your date of birth.   c. Enter your home email address.   d. Click Submit, and follow the next screen’s instructions.  3. Create a Solutionary ID. This will  be your [employer] login ID and cannot be changed, so think of one that is secure and easy to remember.  4. Create a [employer] password. You can change your password at any time.  5. Enter your Password Reset Question and Answer. This can be used at a later time if you forget your password.   6. Enter your e-mail address. This allows you to receive e-mail notifications when new information is available in [employer].  7. Click Sign Up. You can now view your health information.    For assistance activating your [employer] account, call [phone]

## 2017-05-17 NOTE — ED NOTES
TO ROOM, LIFTED INTO BED FROM WHEELCHAIR, WEAKNESS BEGAN THIS MORNING, STATES WHAT REVERSES WEAKNESS ARE STEROID INJECTIONS, RECEIVED AND INFUSION EARLIER THIS MONTH AT INFUSION CENTER,HAS SUPRAPUBIC CATH FOR CHRONIC INCONTINENCE, UNCERTAIN WHAT HER DIAGNOSIS IS

## 2017-05-17 NOTE — IP AVS SNAPSHOT
5/22/2017    Kristin Balderrama  [address] [facility] [address]    Dear Kristin:    [facility] wants to ensure your discharge home is safe and you or your loved ones have had all of your questions answered regarding your care after you leave the hospital.    Below is a list of resources and contact information should you have any questions regarding your hospital stay, follow-up instructions, or active medical symptoms.    Questions or Concerns Regarding… Contact   Medical Questions Related to Your Discharge  (7 days a week, 8am-5pm) Contact a Nurse Care Coordinator   [phone]   Medical Questions Not Related to Your Discharge  (24 hours a day / 7 days a week)  Contact the Nurse Health Line   [phone]    Medications or Discharge Instructions Refer to your discharge packet   or contact your [facility] Primary Care Provider   [phone]   Follow-up Appointment(s) Schedule your appointment via [employer]   or contact Scheduling [phone]   Billing Review your statement via [employer]  or contact Billing [phone]   Medical Records Review your records via [employer]   or contact Medical Records [phone]     You may receive a telephone call within two days of discharge. This call is to make certain you understand your discharge instructions and have the opportunity to have any questions answered. You can also easily access your medical information, test results and upcoming appointments via the [employer] free online health management tool. You can learn more and sign up at [employer]. For assistance setting up your [employer] account, please call [phone].    Once again, we want to ensure your discharge home is safe and that you have a clear understanding of any next steps in your care. If you have any questions or concerns, please do not hesitate to contact us, we are here for you. Thank you for choosing [facility] for your healthcare needs.    Sincerely,    Your [facility] Healthcare Team

## 2017-05-17 NOTE — ED NOTES
.  Chief Complaint   Patient presents with   • T-5000 FALL     back pain   • Extremity Weakness     ble unable to stand weight.    biba from home pt has chronic weakness however she can usually get around with walker. Pt reports unable to ambulate this am with falls requiring max assist to get up.

## 2017-05-18 LAB
ANION GAP SERPL CALC-SCNC: 13 MMOL/L (ref 0–11.9)
BUN SERPL-MCNC: 12 MG/DL (ref 8–22)
CALCIUM SERPL-MCNC: 9.2 MG/DL (ref 8.5–10.5)
CHLORIDE SERPL-SCNC: 105 MMOL/L (ref 96–112)
CO2 SERPL-SCNC: 19 MMOL/L (ref 20–33)
CREAT SERPL-MCNC: 0.8 MG/DL (ref 0.5–1.4)
ERYTHROCYTE [DISTWIDTH] IN BLOOD BY AUTOMATED COUNT: 40.8 FL (ref 35.9–50)
GFR SERPL CREATININE-BSD FRML MDRD: >60 ML/MIN/1.73 M 2
GLUCOSE BLD-MCNC: 144 MG/DL (ref 65–99)
GLUCOSE BLD-MCNC: 174 MG/DL (ref 65–99)
GLUCOSE BLD-MCNC: 174 MG/DL (ref 65–99)
GLUCOSE BLD-MCNC: 191 MG/DL (ref 65–99)
GLUCOSE SERPL-MCNC: 254 MG/DL (ref 65–99)
HCT VFR BLD AUTO: 38 % (ref 37–47)
HGB BLD-MCNC: 12.6 G/DL (ref 12–16)
MCH RBC QN AUTO: 30 PG (ref 27–33)
MCHC RBC AUTO-ENTMCNC: 33.2 G/DL (ref 33.6–35)
MCV RBC AUTO: 90.5 FL (ref 81.4–97.8)
PLATELET # BLD AUTO: 201 K/UL (ref 164–446)
PMV BLD AUTO: 10.9 FL (ref 9–12.9)
POTASSIUM SERPL-SCNC: 4.1 MMOL/L (ref 3.6–5.5)
RBC # BLD AUTO: 4.2 M/UL (ref 4.2–5.4)
SODIUM SERPL-SCNC: 137 MMOL/L (ref 135–145)
WBC # BLD AUTO: 4.8 K/UL (ref 4.8–10.8)

## 2017-05-18 PROCEDURE — 36415 COLL VENOUS BLD VENIPUNCTURE: CPT

## 2017-05-18 PROCEDURE — 97162 PT EVAL MOD COMPLEX 30 MIN: CPT

## 2017-05-18 PROCEDURE — A9270 NON-COVERED ITEM OR SERVICE: HCPCS | Performed by: HOSPITALIST

## 2017-05-18 PROCEDURE — 700111 HCHG RX REV CODE 636 W/ 250 OVERRIDE (IP): Performed by: HOSPITALIST

## 2017-05-18 PROCEDURE — 97166 OT EVAL MOD COMPLEX 45 MIN: CPT

## 2017-05-18 PROCEDURE — G8978 MOBILITY CURRENT STATUS: HCPCS | Mod: CL

## 2017-05-18 PROCEDURE — 80048 BASIC METABOLIC PNL TOTAL CA: CPT

## 2017-05-18 PROCEDURE — G8988 SELF CARE GOAL STATUS: HCPCS | Mod: CI

## 2017-05-18 PROCEDURE — 770006 HCHG ROOM/CARE - MED/SURG/GYN SEMI*

## 2017-05-18 PROCEDURE — 99233 SBSQ HOSP IP/OBS HIGH 50: CPT | Performed by: INTERNAL MEDICINE

## 2017-05-18 PROCEDURE — 85027 COMPLETE CBC AUTOMATED: CPT

## 2017-05-18 PROCEDURE — 700102 HCHG RX REV CODE 250 W/ 637 OVERRIDE(OP): Performed by: HOSPITALIST

## 2017-05-18 PROCEDURE — 82962 GLUCOSE BLOOD TEST: CPT

## 2017-05-18 PROCEDURE — G8979 MOBILITY GOAL STATUS: HCPCS | Mod: CI

## 2017-05-18 PROCEDURE — 700111 HCHG RX REV CODE 636 W/ 250 OVERRIDE (IP): Performed by: INTERNAL MEDICINE

## 2017-05-18 PROCEDURE — G8987 SELF CARE CURRENT STATUS: HCPCS | Mod: CK

## 2017-05-18 RX ORDER — METHYLPREDNISOLONE SODIUM SUCCINATE 125 MG/2ML
250 INJECTION, POWDER, LYOPHILIZED, FOR SOLUTION INTRAMUSCULAR; INTRAVENOUS EVERY 6 HOURS
Status: COMPLETED | OUTPATIENT
Start: 2017-05-18 | End: 2017-05-21

## 2017-05-18 RX ADMIN — OXYCODONE HYDROCHLORIDE AND ACETAMINOPHEN 2 TABLET: 10; 325 TABLET ORAL at 14:46

## 2017-05-18 RX ADMIN — ZIPRASIDONE HCL 160 MG: 40 CAPSULE ORAL at 17:06

## 2017-05-18 RX ADMIN — BACLOFEN 10 MG: 10 TABLET ORAL at 05:38

## 2017-05-18 RX ADMIN — ASPIRIN 81 MG: 81 TABLET ORAL at 08:06

## 2017-05-18 RX ADMIN — METHYLPREDNISOLONE SODIUM SUCCINATE 250 MG: 125 INJECTION, POWDER, FOR SOLUTION INTRAMUSCULAR; INTRAVENOUS at 23:59

## 2017-05-18 RX ADMIN — STANDARDIZED SENNA CONCENTRATE AND DOCUSATE SODIUM 2 TABLET: 8.6; 5 TABLET, FILM COATED ORAL at 08:06

## 2017-05-18 RX ADMIN — FLUOXETINE 10 MG: 10 CAPSULE ORAL at 08:06

## 2017-05-18 RX ADMIN — NITROFURANTOIN MONOHYDRATE AND NITROFURANTOIN MACROCRYSTALLINE 100 MG: 75; 25 CAPSULE ORAL at 17:06

## 2017-05-18 RX ADMIN — OXYCODONE HYDROCHLORIDE AND ACETAMINOPHEN 2 TABLET: 10; 325 TABLET ORAL at 00:44

## 2017-05-18 RX ADMIN — INSULIN LISPRO 1 UNITS: 100 INJECTION, SOLUTION INTRAVENOUS; SUBCUTANEOUS at 11:31

## 2017-05-18 RX ADMIN — METHYLPREDNISOLONE SODIUM SUCCINATE 250 MG: 125 INJECTION, POWDER, FOR SOLUTION INTRAMUSCULAR; INTRAVENOUS at 14:55

## 2017-05-18 RX ADMIN — BACLOFEN 10 MG: 10 TABLET ORAL at 22:14

## 2017-05-18 RX ADMIN — HEPARIN SODIUM 5000 UNITS: 5000 INJECTION, SOLUTION INTRAVENOUS; SUBCUTANEOUS at 05:38

## 2017-05-18 RX ADMIN — NITROFURANTOIN MONOHYDRATE AND NITROFURANTOIN MACROCRYSTALLINE 100 MG: 75; 25 CAPSULE ORAL at 08:06

## 2017-05-18 RX ADMIN — OXYCODONE HYDROCHLORIDE AND ACETAMINOPHEN 2 TABLET: 10; 325 TABLET ORAL at 08:06

## 2017-05-18 RX ADMIN — INSULIN LISPRO 1 UNITS: 100 INJECTION, SOLUTION INTRAVENOUS; SUBCUTANEOUS at 20:46

## 2017-05-18 RX ADMIN — STANDARDIZED SENNA CONCENTRATE AND DOCUSATE SODIUM 2 TABLET: 8.6; 5 TABLET, FILM COATED ORAL at 22:14

## 2017-05-18 RX ADMIN — PROCHLORPERAZINE EDISYLATE 10 MG: 5 INJECTION INTRAMUSCULAR; INTRAVENOUS at 03:23

## 2017-05-18 RX ADMIN — HEPARIN SODIUM 5000 UNITS: 5000 INJECTION, SOLUTION INTRAVENOUS; SUBCUTANEOUS at 22:14

## 2017-05-18 RX ADMIN — LEVOTHYROXINE SODIUM 75 MCG: 75 TABLET ORAL at 05:38

## 2017-05-18 RX ADMIN — HEPARIN SODIUM 5000 UNITS: 5000 INJECTION, SOLUTION INTRAVENOUS; SUBCUTANEOUS at 14:46

## 2017-05-18 RX ADMIN — INSULIN LISPRO 1 UNITS: 100 INJECTION, SOLUTION INTRAVENOUS; SUBCUTANEOUS at 05:44

## 2017-05-18 RX ADMIN — METHYLPREDNISOLONE SODIUM SUCCINATE 250 MG: 125 INJECTION, POWDER, FOR SOLUTION INTRAMUSCULAR; INTRAVENOUS at 18:03

## 2017-05-18 RX ADMIN — BACLOFEN 10 MG: 10 TABLET ORAL at 14:46

## 2017-05-18 RX ADMIN — PROCHLORPERAZINE EDISYLATE 5 MG: 5 INJECTION INTRAMUSCULAR; INTRAVENOUS at 15:03

## 2017-05-18 ASSESSMENT — COGNITIVE AND FUNCTIONAL STATUS - GENERAL
MOVING TO AND FROM BED TO CHAIR: A LOT
DRESSING REGULAR LOWER BODY CLOTHING: A LOT
TURNING FROM BACK TO SIDE WHILE IN FLAT BAD: A LOT
WALKING IN HOSPITAL ROOM: TOTAL
MOVING FROM LYING ON BACK TO SITTING ON SIDE OF FLAT BED: UNABLE
SUGGESTED CMS G CODE MODIFIER DAILY ACTIVITY: CK
DAILY ACTIVITIY SCORE: 18
MOBILITY SCORE: 9
STANDING UP FROM CHAIR USING ARMS: A LOT
CLIMB 3 TO 5 STEPS WITH RAILING: TOTAL
HELP NEEDED FOR BATHING: A LOT
TOILETING: A LOT
SUGGESTED CMS G CODE MODIFIER MOBILITY: CM

## 2017-05-18 ASSESSMENT — ENCOUNTER SYMPTOMS
WEAKNESS: 1
MYALGIAS: 1
EYES NEGATIVE: 1
FEVER: 0
PSYCHIATRIC NEGATIVE: 1
CARDIOVASCULAR NEGATIVE: 1
HEADACHES: 0
HEARTBURN: 0
COUGH: 0
BRUISES/BLEEDS EASILY: 0
DEPRESSION: 0
RESPIRATORY NEGATIVE: 1
DIZZINESS: 0
GASTROINTESTINAL NEGATIVE: 1
NECK PAIN: 0
BLURRED VISION: 0

## 2017-05-18 ASSESSMENT — PAIN SCALES - GENERAL
PAINLEVEL_OUTOF10: 9
PAINLEVEL_OUTOF10: 7
PAINLEVEL_OUTOF10: 9
PAINLEVEL_OUTOF10: 7
PAINLEVEL_OUTOF10: 6
PAINLEVEL_OUTOF10: 5
PAINLEVEL_OUTOF10: 7
PAINLEVEL_OUTOF10: 7

## 2017-05-18 ASSESSMENT — GAIT ASSESSMENTS: GAIT LEVEL OF ASSIST: UNABLE TO PARTICIPATE

## 2017-05-18 ASSESSMENT — ACTIVITIES OF DAILY LIVING (ADL): TOILETING: REQUIRES ASSIST

## 2017-05-18 NOTE — CARE PLAN
Problem: Safety  Goal: Will remain free from injury  Outcome: PROGRESSING AS EXPECTED  Bed alarm in place    Problem: Pain Management  Goal: Pain level will decrease to patient’s comfort goal  Outcome: PROGRESSING SLOWER THAN EXPECTED  Pain meds given per MAR

## 2017-05-18 NOTE — PROGRESS NOTES
Pt educated regarding importance of removing briefs to decrease risk for impaired skin integrity. Pt verbalizes understanding, refuses to remove.

## 2017-05-18 NOTE — THERAPY
"Occupational Therapy Evaluation completed.   Functional Status:  Mod A supine to sit.  Max A LB dressing.  Pt stood and did a sit-pivot transfer to bedside chair with mod A.  Pt left up in chair for lunch.  Plan of Care: Will benefit from Occupational Therapy 1-2 times per week  Discharge Recommendations:  Equipment: No Equipment Needed. Post-acute therapy Discharge to a transitional care facility for continued skilled therapy services depending on progress.    See \"Rehab Therapy-Acute\" Patient Summary Report for complete documentation.    "

## 2017-05-18 NOTE — DISCHARGE PLANNING
Care Transition Team Assessment    IHD met with patient and family bedside. She stated she lives with her grandparents and previously had no services. She does use O2 through [employer] and DME at home including a walker, wheelchair and cane. Her grandmother drives her for appointments and will be able to drive her home when she's cleared. She is unsure of her discharge needs at this time.     Information Source  Orientation : Oriented x 4  Information Given By: Patient  Informant's Name: Kristin  Who is responsible for making decisions for patient? : Patient         Elopement Risk  Legal Hold: No  Ambulatory or Self Mobile in Wheelchair: No-Not an Elopement Risk  Elopement Risk: Not at Risk for Elopement    Interdisciplinary Discharge Planning  Primary Care Physician: Dr. Torres Brody  Lives with - Patient's Self Care Capacity: Other (Comments) (Grandparents)  Patient or legal guardian wants to designate a caregiver (see row info): No  Support Systems: Family Member(s), Children  Housing / Facility: [address] [place] [address]  Do You Take your Prescribed Medications Regularly: Yes  Prior Services: None  Patient Expects to be Discharged to:: Home  Assistance Needed: No  Durable Medical Equipment: Walker, Home Oxygen, Other - Specify (Cane, wheelchair)  DME Provider / Phone: [employer] [facility] [employer]    Discharge Preparedness  What is your plan after discharge?: Uncertain - pending medical team collaboration  What are your discharge supports?: Grandparent  Prior Functional Level: Ambulatory, Uses Cane, Uses Walker, Uses Wheelchair, Independent with Activities of Daily Living, Independent with Medication Management  Difficulity with ADLs: Walking  Difficulty with ADLs Comment: Uses DME at home  Difficulity with IADLs: Driving  Difficulity with IADL Comments: Pablo Palafox drives    Functional Assesment  Prior Functional Level: Ambulatory, Uses Cane, Uses Walker, Uses Wheelchair, Independent with Activities of Daily Living, Independent with  Medication Management    Finances  Financial Barriers to Discharge: No  Prescription Coverage: Yes (Nasreen Díaz)                   Domestic Abuse  Have you ever been the victim of abuse or violence?: No    Psychological Assessment  History of Substance Abuse: None  History of Psychiatric Problems: No  Non-compliant with Treatment: No  Newly Diagnosed Illness: No    Discharge Risks or Barriers  Discharge risks or barriers?: No    Anticipated Discharge Information  Anticipated discharge disposition: Discharge needs currently unknown  Discharge Address: [address] [place] [address] [place] [address]  Discharge Contact Phone Number: [phone]

## 2017-05-18 NOTE — DIETARY
NUTRITION SERVICES: BMI - Pt with BMI >40 (=43.4). Weight loss counseling not appropriate in acute care setting. RECOMMEND - Referral to outpatient nutrition services for weight management after D/C.

## 2017-05-18 NOTE — THERAPY
"Physical Therapy Evaluation completed.   Bed Mobility:  Supine to Sit: Moderate Assist  Transfers: Sit to Stand: Moderate Assist (of 2 )  Gait: Level Of Assist: Unable to Participate      Plan of Care: Will benefit from Physical Therapy 2 times per week  Discharge Recommendations: Equipment: Will Continue to Assess for Equipment Needs.   Pt presents with impaired activity tolerance and LE strength/motor control. Pt is most limited by LE waekness, no active movement to command during exam but noted spontaneous movement during functional mobility. ACute PT will continue to follow, may need placement as her caregiver grandfather is currently hospitalized and she is requiring mod A for transfers; however, anticipate steady progress with medical management and may be able to return home at w/c level. Will continue to update.   See \"Rehab Therapy-Acute\" Patient Summary Report for complete documentation.     "

## 2017-05-18 NOTE — H&P
DATE OF ADMISSION:  05/17/2017.    CHIEF COMPLAINT:  Ground level fall.    PRIMARY CARE PHYSICIAN:  Torres Brody MD    OUTPATIENT NEUROLOGIST:  Sandoval Fox MD    ADMITTED TO:  [facility] hospitalist, Dr. Hernandez.    DIAGNOSIS:  Hashimoto's encephalitis.    HISTORY OF CURRENT ILLNESS:  The patient is a 26-year-old female who was   diagnosed as an outpatient by Dr. Fox with Hashimoto's encephalitis.  When   the patient's encephalitis worsens, the patient develops severe neurological   deficits and at that point, needs high dose IV steroids to combat the antibody   response from the Hashimoto's thyroiditis.  The patient at this point has   again incurred with one of her flare ups.  This morning around 1:00 a.m., she   woke up and she was stumbling to the bathroom like she was drunk.  She went   back to sleep.  She woke up about 6:00 a.m. and at that point, when she tried   to get out of bed, she fell out of bed and landed on the floor.  Her   grandparents who she lives with had to call the paramedics and the    literally had to pick her up and carry her into the ambulance.  The patient   has not been able to walk or bear weight on her lower extremities.  At this   point, the patient's reflexes in the lower extremities are also absent and the   patient at this point will need to be admitted to the neurology floor.  She   will receive IV steroids.  She potentially needs placement for recurrent IV   steroid administration as well as monitoring of her neuro status and physical   and occupational therapy.    PAST MEDICAL HISTORY:  Includes scoliosis, multiple personality disorder,   chronic urinary tract infections, gastroesophageal reflux disease, history of   traumatic falls, sinus tachycardia, urinary incontinence, hypertension,   Hashimoto's thyroiditis, obesity, asthma, degenerative joint disease,   polycystic ovarian syndrome, kidneys stones, history of arrhythmia, history of   tuberculosis at age 7,  history of sleep apnea, and history of mitochondrial   disease.    PAST SURGICAL HISTORY:  Includes cholecystectomy, lumbar fusion, cardiac   ablation, tonsillectomy, bowel stimulator insertion, gastroscopy with balloon   dilation, muscle biopsy, and then exploratory abdominal surgery.    ALLERGIES:  SHE IS ALLERGIC TO CEFDINIR, DEPAKOTE, ABILIFY, AMITRIPTYLINE,   AMOXICILLIN, CIPROFLOXACIN, CLINDAMYCIN, DOXYCYCLINE, _____, FLAGYL, FLOMAX,   METFORMIN, SULFA, TAPE, VANCOMYCIN, KEFLEX, ERYTHROMYCIN, LEVAQUIN,   METRONIDAZOLE, VALPROIC ACID.    MEDICATIONS:  At the time of  admission include albuterol 2 puffs inhaled   every 6 hours p.r.n. for shortness of breath, aspirin 81 mg p.o. daily,   Baclofen 10 mg 3 times daily, cranberry over the counter, vitamin B12 at 500   mcg twice daily, fentanyl 25 mcg patch every 72 hours, Prozac 10 mg p.o.   daily, Lasix 20 mg daily, ivabradine 1 tablet twice daily of 5 mg tablets,   lactulose 10 mg twice daily, Synthroid 75 mcg daily, Victoza subcutaneously 3   mL daily, melatonin 5 mg tablets 2 tablets at bedtime, nitroglycerin 0.4 mg   sublingually p.r.n., omeprazole 20 mg twice daily, oxycodone 10/325 mg 1   tablet daily, Phenergan 25 mg every 6 hours p.r.n. for nausea and vomiting,   Zantac 150 mg twice daily, sodium bicarbonate 325 mg 3 times daily, vitamin D   50,000 units p.o. daily, and Geodon 80 mg daily.    SOCIAL HISTORY:  She is a passive smoker, never smoked herself.  No drugs.  No   alcohol.  She has an advanced directive.  She wishes to be DNR code status.    FAMILY HISTORY:  Mother had fibromyalgia and heart disease.  She did not know   of any family history on her father's side.    REVIEW OF SYSTEMS:  Currently, the patient complains of generalized weakness,   inability to move her lower extremities, cannot bear weight or walk on them.    She complains of a headache ever since she hit her head, complains of back   pain, complains of dysuria and burning on urination.   Denies chest pain or   shortness of breath.  Denies any abdominal pain.  All other review of systems   were reviewed and are negative.    PHYSICAL EXAMINATION:  VITAL SIGNS:  Temperature 36.4 degrees Celsius, pulse 80, respirations 16,   blood pressure 144/89.  She is 111 kilograms in weight and she is 160 cm tall.  GENERAL:  She is currently alert, awake, oriented x3, pleasant, cooperative   female, appears her stated age.  She is obese.  HEENT:  Head atraumatic.  Eyes follow in normal range of gaze.  Pupils are   equal, round, reactive bilaterally.  Nose midline without discharge.  Ears   bilaterally intact without discharge.  Oral cavity, moist mucous membranes.    No apparent focus infection in the oral cavity.  NECK:  Soft and supple.  No JVD, carotid bruit, thyromegaly, or   lymphadenopathy appreciated.  CHEST WALL:  Moves equal with inspiration and expiration.  No paradoxical   motion.  No reproducible chest wall tenderness.  HEART:  S1, S2.  No murmurs or gallops detected.  LUNGS:  Clear to auscultation.  No rales or rhonchi detected.  ABDOMEN:  Soft.  Bowel sounds present in all 4 quadrants.  No hepatomegaly,   splenomegaly, rebound, or tenderness elicited.  GENITAL:  Exam deferred.  RECTAL:  Exam deferred.  EXTREMITIES:  Upper extremities, positive pulses.  Good muscle strength, good   muscle tone.  Positive deep tendon reflexes.  Lower extremities bilaterally,   decreased muscle strength to the point where she is not able to move her lower   extremities.  She has areflexic.  Muscle tone is very minimal.  The patient's   pulses are intact.  She does have +1 edema of both lower extremities.  The   edema, I believe, is related however to her weight.  NEUROLOGIC:  Cranial nerves II-XII grossly within normal limits and then she   has deficits of her lower extremities as described.  SKIN:  Normal turgor.  No rashes or abrasions identified.    LABORATORY EVALUATION:  WBC count is 6.5 with hemoglobin of 13.9,  hematocrit   42.2, platelets 231.  Sodium 136, potassium 3.9, chloride 101, CO2 of 22, BUN   9, creatinine 0.91, calcium 9.9, glucose 106.  Liver function tests are within   normal limits.  Magnesium 1.7 with an INR of 0.93.    IMAGING STUDIES:  Today none.    IMPRESSION AND PLAN:  At this point:  1.  Hashimoto's encephalitis.  The patient will be receiving IV Solu-Medrol 1   g in a pulse dose.  Patient may need several more days of pulse doses to   recover from her lower extremity weakness.  Patient will need physical therapy   and occupational therapy evaluation.  The patient may need placement.  2.  For her urinary tract infection, continue with Bactrim.  SHE HAS MULTIPLE   ALLERGIES TO OTHER ANTIBIOTICS.  3.  For her chronic obstructive pulmonary disease, continue with albuterol,   oxygen, respiratory therapy protocol.  4.  For depression, continue with Prozac.  5.  For chronic pain, continue with fentanyl patch as well as baclofen p.r.n.  6.  For hypothyroidism, continue with Synthroid.  7.  For gastroesophageal reflux disease, continue with omeprazole and Zantac.  8.  For her chronic psychosis, continue with Geodon.  9.  For her diabetes, continue with Victoza sliding scale coverage as well as   diabetic diet.    Patient at this point is full admission to the medical floor.  I believe, she   will need more than 2 midnights of inpatient stay to control the situation and   patient will most likely need placement.    I have at this point attempted to consult endocrinology.  I have spoken with   Dr. Espino.  He has referred me to the [facility] endocrinology group as he has   not encountered this condition previously and feels that they would best serve   the patient.    Thank you for the opportunity to take care of this patient during her hospital   stay.       ____________________________________     MD LUCERO JACOBO / CHRISTELLE    DD:  05/17/2017 18:31:37  DT:  05/17/2017 20:52:14    D#:  [id number]  Job#:   [id number]    cc: RAGHU ALEX MD, KELL MARTINEZ MD

## 2017-05-18 NOTE — PROGRESS NOTES
Assumed care of pt from day RN. Pt in bed with strip alarm in place. Call light within reach.  Denies pain or needs at this time.  Will monitor, assist and medicate per MAR for duration of shift.  Hourly rounding implemented.      Q2 turns in place. SP cath in place, VSS.

## 2017-05-18 NOTE — CARE PLAN
Problem: Safety  Goal: Will remain free from falls  Outcome: PROGRESSING AS EXPECTED    Problem: Infection  Goal: Will remain free from infection  Outcome: PROGRESSING AS EXPECTED  Intervention: Assess signs and symptoms of infection  Assessed for signs and symptoms of infection.   Intervention: Implement standard precautions and perform hand washing before and after patient contact  Standard precautions and hand washing performed before and after patient contact per protocol.    Intervention: Assess for removal of potential routes of infection, such as IV, central line, intra-arterial or urinary catheters  Removal of potential routes of infection assessed.

## 2017-05-18 NOTE — PROGRESS NOTES
Hospital Medicine Progress Note, Adult, Complex               Author: Noman Bruno Date & Time created: 5/18/2017  2:20 PM     Interval History:  Relaxed in room, holding baby.  Comfortable. D/w Dr. Fox, recommending 1 gm solumedrol daily x 4 days.     Review of Systems:  Review of Systems   Constitutional: Negative for fever.   HENT: Negative.    Eyes: Negative.  Negative for blurred vision.   Respiratory: Negative.  Negative for cough.    Cardiovascular: Negative.  Negative for chest pain.   Gastrointestinal: Negative.  Negative for heartburn.   Genitourinary: Negative.  Negative for dysuria.   Musculoskeletal: Positive for myalgias. Negative for neck pain.   Skin: Negative.  Negative for rash.   Neurological: Positive for weakness. Negative for dizziness and headaches.   Endo/Heme/Allergies: Negative.  Does not bruise/bleed easily.   Psychiatric/Behavioral: Negative.  Negative for depression.       Physical Exam:  Physical Exam  A&Ox3 obese  PERRL EOMI mmm  Supple no jvd bruit or carmelo  RRR no mrg  CTAB PRASANTH  Soft abd ntnd bs+ spc  1+ edema    Labs:        Invalid input(s): [id number]      Recent Labs      05/17/17   1023  05/18/17 0339   SODIUM  136  137   POTASSIUM  3.9  4.1   CHLORIDE  101  105   CO2  22  19*   BUN  9  12   CREATININE  0.91  0.80   MAGNESIUM  1.7   --    CALCIUM  9.9  9.2     Recent Labs      05/17/17   1023  05/18/17   0339   ALTSGPT  41   --    ASTSGOT  20   --    ALKPHOSPHAT  88   --    TBILIRUBIN  0.6   --    GLUCOSE  106*  254*     Recent Labs      05/17/17   1023  05/18/17 0339   RBC  4.61  4.20   HEMOGLOBIN  13.9  12.6   HEMATOCRIT  42.2  38.0   PLATELETCT  231  201   PROTHROMBTM  12.7   --    APTT  29.7   --    INR  0.93   --      Recent Labs      05/17/17   1023  05/18/17 0339   WBC  6.5  4.8   NEUTSPOLYS  52.00   --    LYMPHOCYTES  35.10   --    MONOCYTES  9.10   --    EOSINOPHILS  2.20   --    BASOPHILS  0.80   --    ASTSGOT  20   --    ALTSGPT  41   --    ALKPHOSPHAT   88   --    TBILIRUBIN  0.6   --            Hemodynamics:  Temp (24hrs), Av.7 °C (98 °F), Min:36.2 °C (97.2 °F), Max:37.1 °C (98.8 °F)  Temperature: 36.6 °C (97.8 °F)  Pulse  Av.9  Min: 70  Max: 85   Blood Pressure: 118/56 mmHg     Respiratory:    Respiration: 16, Pulse Oximetry: 98 %        RUL Breath Sounds: Diminished, RML Breath Sounds: Diminished, RLL Breath Sounds: Diminished, MARY Breath Sounds: Diminished, LLL Breath Sounds: Diminished  Fluids:    Intake/Output Summary (Last 24 hours) at 17 1420  Last data filed at 17 1100   Gross per 24 hour   Intake    900 ml   Output   2075 ml   Net  -1175 ml        GI/Nutrition:  Orders Placed This Encounter   Procedures   • Diet Order     Standing Status: Standing      Number of Occurrences: 1      Standing Expiration Date:      Order Specific Question:  Diet:     Answer:  Regular [1]     Medical Decision Making, by Problem:  Active Hospital Problems    Diagnosis   • Depression [F32.9] - prozac   • Schizophrenia (CMS-HCC) [F20.9] - geodon   • Hashimoto's encephalopathy [E06.3, G93.49] - will d/w Dr. Fox   • Uncontrolled type 2 diabetes mellitus without complication, without long-term current use of insulin (CMS-HCC) [E11.65] - ISS, liraglutide   • Chronic suprapubic catheter (CMS-HCC) [Z93.59] - on macrobid   • HTN (hypertension) [I10] - follow   • Chronic pain syndrome [G89.4] - fentanyl   • 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] - geodon   • GERD (gastroesophageal reflux disease) [K21.9]   • Peripheral neuropathy (CMS-HCC) [G62.9] - prns   Hypothyroid - replace    EKG reviewed, Labs reviewed, Medications reviewed and Radiology images reviewed  Andrade catheter: No Andrade      DVT Prophylaxis: Heparin    Ulcer prophylaxis: Not indicated  Antibiotics: Treating active infection/contamination beyond 24 hours perioperative coverage

## 2017-05-19 LAB
GLUCOSE BLD-MCNC: 146 MG/DL (ref 65–99)
GLUCOSE BLD-MCNC: 147 MG/DL (ref 65–99)
GLUCOSE BLD-MCNC: 155 MG/DL (ref 65–99)
GLUCOSE BLD-MCNC: 157 MG/DL (ref 65–99)

## 2017-05-19 PROCEDURE — 99233 SBSQ HOSP IP/OBS HIGH 50: CPT | Performed by: INTERNAL MEDICINE

## 2017-05-19 PROCEDURE — 700111 HCHG RX REV CODE 636 W/ 250 OVERRIDE (IP): Performed by: HOSPITALIST

## 2017-05-19 PROCEDURE — 700111 HCHG RX REV CODE 636 W/ 250 OVERRIDE (IP): Performed by: INTERNAL MEDICINE

## 2017-05-19 PROCEDURE — 770006 HCHG ROOM/CARE - MED/SURG/GYN SEMI*

## 2017-05-19 PROCEDURE — 700102 HCHG RX REV CODE 250 W/ 637 OVERRIDE(OP): Performed by: HOSPITALIST

## 2017-05-19 PROCEDURE — A9270 NON-COVERED ITEM OR SERVICE: HCPCS | Performed by: HOSPITALIST

## 2017-05-19 PROCEDURE — 82962 GLUCOSE BLOOD TEST: CPT | Mod: 91

## 2017-05-19 RX ADMIN — ONDANSETRON 4 MG: 4 TABLET, ORALLY DISINTEGRATING ORAL at 12:43

## 2017-05-19 RX ADMIN — BACLOFEN 10 MG: 10 TABLET ORAL at 05:14

## 2017-05-19 RX ADMIN — METHYLPREDNISOLONE SODIUM SUCCINATE 250 MG: 125 INJECTION, POWDER, FOR SOLUTION INTRAMUSCULAR; INTRAVENOUS at 17:22

## 2017-05-19 RX ADMIN — OXYCODONE HYDROCHLORIDE AND ACETAMINOPHEN 2 TABLET: 10; 325 TABLET ORAL at 12:53

## 2017-05-19 RX ADMIN — ZIPRASIDONE HCL 160 MG: 40 CAPSULE ORAL at 17:22

## 2017-05-19 RX ADMIN — OXYCODONE HYDROCHLORIDE AND ACETAMINOPHEN 2 TABLET: 10; 325 TABLET ORAL at 03:34

## 2017-05-19 RX ADMIN — METHYLPREDNISOLONE SODIUM SUCCINATE 250 MG: 125 INJECTION, POWDER, FOR SOLUTION INTRAMUSCULAR; INTRAVENOUS at 12:50

## 2017-05-19 RX ADMIN — STANDARDIZED SENNA CONCENTRATE AND DOCUSATE SODIUM 2 TABLET: 8.6; 5 TABLET, FILM COATED ORAL at 21:27

## 2017-05-19 RX ADMIN — FLUOXETINE 10 MG: 10 CAPSULE ORAL at 08:39

## 2017-05-19 RX ADMIN — ASPIRIN 81 MG: 81 TABLET ORAL at 08:39

## 2017-05-19 RX ADMIN — HEPARIN SODIUM 5000 UNITS: 5000 INJECTION, SOLUTION INTRAVENOUS; SUBCUTANEOUS at 12:43

## 2017-05-19 RX ADMIN — HEPARIN SODIUM 5000 UNITS: 5000 INJECTION, SOLUTION INTRAVENOUS; SUBCUTANEOUS at 21:27

## 2017-05-19 RX ADMIN — NITROFURANTOIN MONOHYDRATE AND NITROFURANTOIN MACROCRYSTALLINE 100 MG: 75; 25 CAPSULE ORAL at 17:22

## 2017-05-19 RX ADMIN — NITROFURANTOIN MONOHYDRATE AND NITROFURANTOIN MACROCRYSTALLINE 100 MG: 75; 25 CAPSULE ORAL at 08:39

## 2017-05-19 RX ADMIN — LEVOTHYROXINE SODIUM 75 MCG: 75 TABLET ORAL at 05:14

## 2017-05-19 RX ADMIN — BACLOFEN 10 MG: 10 TABLET ORAL at 12:43

## 2017-05-19 RX ADMIN — HEPARIN SODIUM 5000 UNITS: 5000 INJECTION, SOLUTION INTRAVENOUS; SUBCUTANEOUS at 05:14

## 2017-05-19 RX ADMIN — STANDARDIZED SENNA CONCENTRATE AND DOCUSATE SODIUM 2 TABLET: 8.6; 5 TABLET, FILM COATED ORAL at 08:39

## 2017-05-19 RX ADMIN — METHYLPREDNISOLONE SODIUM SUCCINATE 250 MG: 125 INJECTION, POWDER, FOR SOLUTION INTRAMUSCULAR; INTRAVENOUS at 05:15

## 2017-05-19 RX ADMIN — INSULIN LISPRO 1 UNITS: 100 INJECTION, SOLUTION INTRAVENOUS; SUBCUTANEOUS at 05:50

## 2017-05-19 RX ADMIN — BACLOFEN 10 MG: 10 TABLET ORAL at 21:27

## 2017-05-19 ASSESSMENT — ENCOUNTER SYMPTOMS
BLURRED VISION: 0
DEPRESSION: 0
BRUISES/BLEEDS EASILY: 0
NECK PAIN: 0
GASTROINTESTINAL NEGATIVE: 1
HEARTBURN: 0
EYES NEGATIVE: 1
DIZZINESS: 0
MYALGIAS: 1
RESPIRATORY NEGATIVE: 1
CARDIOVASCULAR NEGATIVE: 1
WEAKNESS: 1
FEVER: 0
HEADACHES: 0
PSYCHIATRIC NEGATIVE: 1
COUGH: 0

## 2017-05-19 ASSESSMENT — LIFESTYLE VARIABLES: EVER_SMOKED: NEVER

## 2017-05-19 ASSESSMENT — PAIN SCALES - GENERAL
PAINLEVEL_OUTOF10: 8
PAINLEVEL_OUTOF10: 3
PAINLEVEL_OUTOF10: 3
PAINLEVEL_OUTOF10: 4

## 2017-05-19 NOTE — PROGRESS NOTES
Assumed care of pt from day RN. Pt in bed with strip alarm in place. Call light within reach.  Denies pain or needs at this time.  Will monitor, assist and medicate per MAR for duration of shift.  Hourly rounding implemented.      Pt able to move toes. Q2 turns in place.

## 2017-05-19 NOTE — PROGRESS NOTES
Hospital Medicine Progress Note, Adult, Complex               Author: Noman Bruno Date & Time created: 5/19/2017  2:09 PM     Interval History:  Relaxed in room.  More movement of her legs.  Discussed plan, gramma at bedside.     Review of Systems:  Review of Systems   Constitutional: Negative for fever.   HENT: Negative.    Eyes: Negative.  Negative for blurred vision.   Respiratory: Negative.  Negative for cough.    Cardiovascular: Negative.  Negative for chest pain.   Gastrointestinal: Negative.  Negative for heartburn.   Genitourinary: Negative.  Negative for dysuria.   Musculoskeletal: Positive for myalgias. Negative for neck pain.   Skin: Negative.  Negative for rash.   Neurological: Positive for weakness. Negative for dizziness and headaches.   Endo/Heme/Allergies: Negative.  Does not bruise/bleed easily.   Psychiatric/Behavioral: Negative.  Negative for depression.       Physical Exam:  Physical Exam  A&Ox3 obese  PERRL EOMI mmm  Supple no jvd bruit or carmelo  RRR no mrg  CTAB PRASANTH  Soft abd ntnd bs+ spc  1+ edema    Labs:        Invalid input(s): [id number]      Recent Labs      05/17/17   1023  05/18/17   0339   SODIUM  136  137   POTASSIUM  3.9  4.1   CHLORIDE  101  105   CO2  22  19*   BUN  9  12   CREATININE  0.91  0.80   MAGNESIUM  1.7   --    CALCIUM  9.9  9.2     Recent Labs      05/17/17   1023  05/18/17   0339   ALTSGPT  41   --    ASTSGOT  20   --    ALKPHOSPHAT  88   --    TBILIRUBIN  0.6   --    GLUCOSE  106*  254*     Recent Labs      05/17/17   1023  05/18/17   0339   RBC  4.61  4.20   HEMOGLOBIN  13.9  12.6   HEMATOCRIT  42.2  38.0   PLATELETCT  231  201   PROTHROMBTM  12.7   --    APTT  29.7   --    INR  0.93   --      Recent Labs      05/17/17   1023  05/18/17   0339   WBC  6.5  4.8   NEUTSPOLYS  52.00   --    LYMPHOCYTES  35.10   --    MONOCYTES  9.10   --    EOSINOPHILS  2.20   --    BASOPHILS  0.80   --    ASTSGOT  20   --    ALTSGPT  41   --    ALKPHOSPHAT  88   --    TBILIRUBIN  0.6    --            Hemodynamics:  Temp (24hrs), Av.3 °C (97.3 °F), Min:36.1 °C (97 °F), Max:36.4 °C (97.6 °F)  Temperature: 36.1 °C (97 °F)  Pulse  Av.7  Min: 70  Max: 85   Blood Pressure: 103/59 mmHg     Respiratory:    Respiration: 18, Pulse Oximetry: 99 %        RUL Breath Sounds: Diminished, RML Breath Sounds: Diminished, RLL Breath Sounds: Diminished, MARY Breath Sounds: Diminished, LLL Breath Sounds: Diminished  Fluids:    Intake/Output Summary (Last 24 hours) at 17 1409  Last data filed at 17 0600   Gross per 24 hour   Intake    400 ml   Output   1800 ml   Net  -1400 ml        GI/Nutrition:  Orders Placed This Encounter   Procedures   • Diet Order     Standing Status: Standing      Number of Occurrences: 1      Standing Expiration Date:      Order Specific Question:  Diet:     Answer:  Regular [1]     Medical Decision Making, by Problem:  Active Hospital Problems    Diagnosis   • Depression [F32.9] - prozac   • Schizophrenia (CMS-HCC) [F20.9] - geodon   • Hashimoto's encephalopathy [E06.3, G93.49] - d/w Dr. Fox, high dose solumedrol x 4 days   • Uncontrolled type 2 diabetes mellitus without complication, without long-term current use of insulin (CMS-HCC) [E11.65] - ISS, liraglutide   • Chronic suprapubic catheter (CMS-HCC) [Z93.59] - on macrobid   • HTN (hypertension) [I10] - follow   • Chronic pain syndrome [G89.4] - fentanyl   • 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] - geodon   • GERD (gastroesophageal reflux disease) [K21.9]   • Peripheral neuropathy (CMS-HCC) [G62.9] - prns   Hypothyroid - replace    EKG reviewed, Labs reviewed, Medications reviewed and Radiology images reviewed  Andrade catheter: No Andrade      DVT Prophylaxis: Heparin    Ulcer prophylaxis: Not indicated  Antibiotics: Treating active infection/contamination beyond 24 hours perioperative coverage

## 2017-05-19 NOTE — PROGRESS NOTES
Pt AAOx4, still not moving BLE. C/O back pain, meds given per MAR. Eating without difficulty. Q 2 hr turn. Up to chair for dinner. VSS

## 2017-05-19 NOTE — DOCUMENTATION QUERY
DOCUMENTATION QUERY    PROVIDERS: Please select “Cosign w/ note”to reply to query.    To better represent the severity of illness of your patient, please review the following information and exercise your independent professional judgment in responding to this query.     UTI and chronic suprapubic catheter is documented in the History and Physical and Progress Notes. Based upon the clinical findings, risk factors, and treatment, can this diagnosis be further specified?    • UTI secondary to suprapubic catheter  • UTI unrelated to suprapubic catheter  • Other explanation of clinical findings  • Unable to determine (no explanation for clinical findings)        The medical record reflects the following:   Clinical Findings  H&P- Complains of back pain, complains of dysuria and burning on urination. Chronic urinary tract infections.  For her urinary tract infection, continue with Bactrim  Progress note- Chronic suprapubic catheter  Urinalysis- Few bacteria, large esterase  Urine Culture-Enterococcus faecalis   Treatment  Antibiotics- Macrobid   Risk Factors  Urinary incontinence, chronic suprapubic cath   Location within medical record  History and Physical, Progress Notes, Lab Results  and Pathology Reports     Thank you,   Terri Peres MSN, RN, CNL, CNML  Clinical   [email or web address]

## 2017-05-19 NOTE — CARE PLAN
Problem: Safety  Goal: Will remain free from falls  Outcome: PROGRESSING AS EXPECTED    Problem: Bowel/Gastric:  Goal: Normal bowel function is maintained or improved  Outcome: PROGRESSING AS EXPECTED  Intervention: Educate patient and significant other/support system about diet, fluid intake, medications and activity to promote bowel function  Pt educated about diet, fluid intake, medications and activity to promote bowel function.   Intervention: Educate patient and significant other/support system about signs and symptoms of constipation and interventions to implement  Pt educated about signs and symptoms of constipation and interventions to implement.

## 2017-05-20 LAB
GLUCOSE BLD-MCNC: 160 MG/DL (ref 65–99)
GLUCOSE BLD-MCNC: 181 MG/DL (ref 65–99)
GLUCOSE BLD-MCNC: 195 MG/DL (ref 65–99)
GLUCOSE BLD-MCNC: 254 MG/DL (ref 65–99)

## 2017-05-20 PROCEDURE — 99233 SBSQ HOSP IP/OBS HIGH 50: CPT | Performed by: INTERNAL MEDICINE

## 2017-05-20 PROCEDURE — A9270 NON-COVERED ITEM OR SERVICE: HCPCS | Performed by: HOSPITALIST

## 2017-05-20 PROCEDURE — 700111 HCHG RX REV CODE 636 W/ 250 OVERRIDE (IP): Performed by: INTERNAL MEDICINE

## 2017-05-20 PROCEDURE — 770006 HCHG ROOM/CARE - MED/SURG/GYN SEMI*

## 2017-05-20 PROCEDURE — 700111 HCHG RX REV CODE 636 W/ 250 OVERRIDE (IP): Performed by: HOSPITALIST

## 2017-05-20 PROCEDURE — 700102 HCHG RX REV CODE 250 W/ 637 OVERRIDE(OP): Performed by: HOSPITALIST

## 2017-05-20 PROCEDURE — 82962 GLUCOSE BLOOD TEST: CPT | Mod: 91

## 2017-05-20 RX ADMIN — BACLOFEN 10 MG: 10 TABLET ORAL at 05:25

## 2017-05-20 RX ADMIN — INSULIN LISPRO 1 UNITS: 100 INJECTION, SOLUTION INTRAVENOUS; SUBCUTANEOUS at 20:22

## 2017-05-20 RX ADMIN — PROCHLORPERAZINE EDISYLATE 10 MG: 5 INJECTION INTRAMUSCULAR; INTRAVENOUS at 14:33

## 2017-05-20 RX ADMIN — INSULIN LISPRO 1 UNITS: 100 INJECTION, SOLUTION INTRAVENOUS; SUBCUTANEOUS at 17:18

## 2017-05-20 RX ADMIN — ASPIRIN 81 MG: 81 TABLET ORAL at 10:41

## 2017-05-20 RX ADMIN — BACLOFEN 10 MG: 10 TABLET ORAL at 20:18

## 2017-05-20 RX ADMIN — INSULIN LISPRO 3 UNITS: 100 INJECTION, SOLUTION INTRAVENOUS; SUBCUTANEOUS at 11:26

## 2017-05-20 RX ADMIN — STANDARDIZED SENNA CONCENTRATE AND DOCUSATE SODIUM 2 TABLET: 8.6; 5 TABLET, FILM COATED ORAL at 10:41

## 2017-05-20 RX ADMIN — HEPARIN SODIUM 5000 UNITS: 5000 INJECTION, SOLUTION INTRAVENOUS; SUBCUTANEOUS at 05:25

## 2017-05-20 RX ADMIN — METHYLPREDNISOLONE SODIUM SUCCINATE 250 MG: 125 INJECTION, POWDER, FOR SOLUTION INTRAMUSCULAR; INTRAVENOUS at 05:25

## 2017-05-20 RX ADMIN — HEPARIN SODIUM 5000 UNITS: 5000 INJECTION, SOLUTION INTRAVENOUS; SUBCUTANEOUS at 20:18

## 2017-05-20 RX ADMIN — FENTANYL 1 PATCH: 25 PATCH, EXTENDED RELEASE TRANSDERMAL at 13:29

## 2017-05-20 RX ADMIN — OXYCODONE HYDROCHLORIDE AND ACETAMINOPHEN 2 TABLET: 10; 325 TABLET ORAL at 04:52

## 2017-05-20 RX ADMIN — HEPARIN SODIUM 5000 UNITS: 5000 INJECTION, SOLUTION INTRAVENOUS; SUBCUTANEOUS at 13:29

## 2017-05-20 RX ADMIN — ZIPRASIDONE HCL 160 MG: 40 CAPSULE ORAL at 17:16

## 2017-05-20 RX ADMIN — OXYCODONE HYDROCHLORIDE AND ACETAMINOPHEN 2 TABLET: 10; 325 TABLET ORAL at 11:38

## 2017-05-20 RX ADMIN — LEVOTHYROXINE SODIUM 75 MCG: 75 TABLET ORAL at 05:25

## 2017-05-20 RX ADMIN — NITROFURANTOIN MONOHYDRATE AND NITROFURANTOIN MACROCRYSTALLINE 100 MG: 75; 25 CAPSULE ORAL at 06:48

## 2017-05-20 RX ADMIN — FLUOXETINE 10 MG: 10 CAPSULE ORAL at 10:40

## 2017-05-20 RX ADMIN — OXYCODONE HYDROCHLORIDE AND ACETAMINOPHEN 2 TABLET: 10; 325 TABLET ORAL at 18:11

## 2017-05-20 RX ADMIN — NITROFURANTOIN MONOHYDRATE AND NITROFURANTOIN MACROCRYSTALLINE 100 MG: 75; 25 CAPSULE ORAL at 17:15

## 2017-05-20 RX ADMIN — METHYLPREDNISOLONE SODIUM SUCCINATE 250 MG: 125 INJECTION, POWDER, FOR SOLUTION INTRAMUSCULAR; INTRAVENOUS at 00:11

## 2017-05-20 RX ADMIN — METHYLPREDNISOLONE SODIUM SUCCINATE 250 MG: 125 INJECTION, POWDER, FOR SOLUTION INTRAMUSCULAR; INTRAVENOUS at 11:22

## 2017-05-20 RX ADMIN — METHYLPREDNISOLONE SODIUM SUCCINATE 250 MG: 125 INJECTION, POWDER, FOR SOLUTION INTRAMUSCULAR; INTRAVENOUS at 17:15

## 2017-05-20 RX ADMIN — STANDARDIZED SENNA CONCENTRATE AND DOCUSATE SODIUM 2 TABLET: 8.6; 5 TABLET, FILM COATED ORAL at 20:18

## 2017-05-20 RX ADMIN — PROCHLORPERAZINE EDISYLATE 10 MG: 5 INJECTION INTRAMUSCULAR; INTRAVENOUS at 05:37

## 2017-05-20 RX ADMIN — BACLOFEN 10 MG: 10 TABLET ORAL at 13:29

## 2017-05-20 RX ADMIN — INSULIN LISPRO 1 UNITS: 100 INJECTION, SOLUTION INTRAVENOUS; SUBCUTANEOUS at 05:35

## 2017-05-20 ASSESSMENT — ENCOUNTER SYMPTOMS
NECK PAIN: 0
WEAKNESS: 1
PSYCHIATRIC NEGATIVE: 1
DIZZINESS: 0
HEADACHES: 0
BRUISES/BLEEDS EASILY: 0
RESPIRATORY NEGATIVE: 1
GASTROINTESTINAL NEGATIVE: 1
COUGH: 0
EYES NEGATIVE: 1
HEARTBURN: 0
FEVER: 0
CARDIOVASCULAR NEGATIVE: 1
MYALGIAS: 1
DEPRESSION: 0
BLURRED VISION: 0

## 2017-05-20 ASSESSMENT — PAIN SCALES - GENERAL
PAINLEVEL_OUTOF10: 3
PAINLEVEL_OUTOF10: 3
PAINLEVEL_OUTOF10: 7
PAINLEVEL_OUTOF10: 8

## 2017-05-20 NOTE — PROGRESS NOTES
Hospital Medicine Progress Note, Adult, Complex               Author: Noman Bruno Date & Time created: 2017  10:51 AM     Interval History:  She feels better.  Doesn't want SNF, legs now moving. I discussed with Dr. Fox, cannot rule out component of conversion disorder.  Relaxed in room with gramma.      Review of Systems:  Review of Systems   Constitutional: Negative for fever.   HENT: Negative.    Eyes: Negative.  Negative for blurred vision.   Respiratory: Negative.  Negative for cough.    Cardiovascular: Negative.  Negative for chest pain.   Gastrointestinal: Negative.  Negative for heartburn.   Genitourinary: Negative.  Negative for dysuria.   Musculoskeletal: Positive for myalgias. Negative for neck pain.   Skin: Negative.  Negative for rash.   Neurological: Positive for weakness. Negative for dizziness and headaches.   Endo/Heme/Allergies: Negative.  Does not bruise/bleed easily.   Psychiatric/Behavioral: Negative.  Negative for depression.       Physical Exam:  Physical Exam  A&Ox3 obese  PERRL EOMI mmm  Supple no jvd bruit or carmelo  RRR no mrg  CTAB PRASANTH  Soft abd ntnd bs+ spc  1+ edema    Labs:        Invalid input(s): [id number]      Recent Labs      17   0339   SODIUM  137   POTASSIUM  4.1   CHLORIDE  105   CO2  19*   BUN  12   CREATININE  0.80   CALCIUM  9.2     Recent Labs      17   0339   GLUCOSE  254*     Recent Labs      17   0339   RBC  4.20   HEMOGLOBIN  12.6   HEMATOCRIT  38.0   PLATELETCT  201     Recent Labs      17   0339   WBC  4.8           Hemodynamics:  Temp (24hrs), Av.4 °C (97.5 °F), Min:36.1 °C (97 °F), Max:37.1 °C (98.8 °F)  Temperature: 36.1 °C (97 °F)  Pulse  Av.1  Min: 70  Max: 86   Blood Pressure: (!) 96/48 mmHg (note to nurse)     Respiratory:    Respiration: 18, Pulse Oximetry: 94 %        RUL Breath Sounds: Diminished, RML Breath Sounds: Diminished, RLL Breath Sounds: Diminished, MARY Breath Sounds: Diminished, LLL Breath Sounds:  Diminished  Fluids:    Intake/Output Summary (Last 24 hours) at 05/20/17 1051  Last data filed at 05/20/17 0600   Gross per 24 hour   Intake    150 ml   Output   1700 ml   Net  -1550 ml        GI/Nutrition:  Orders Placed This Encounter   Procedures   • Diet Order     Standing Status: Standing      Number of Occurrences: 1      Standing Expiration Date:      Order Specific Question:  Diet:     Answer:  Regular [1]     Medical Decision Making, by Problem:  Active Hospital Problems    Diagnosis   • Depression [F32.9] - prozac   • Schizophrenia (CMS-HCC) [F20.9] - geodon, will have psych see her   • Hashimoto's encephalopathy [E06.3, G93.49] - d/w Dr. Fox, high dose solumedrol x 4 days   • Uncontrolled type 2 diabetes mellitus without complication, without long-term current use of insulin (CMS-HCC) [E11.65] - ISS, liraglutide   • Chronic suprapubic catheter (CMS-HCC) [Z93.59] - on macrobid   • HTN (hypertension) [I10] - follow   • Chronic pain syndrome [G89.4] - fentanyl   • 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] - geodon   • GERD (gastroesophageal reflux disease) [K21.9]   • Peripheral neuropathy (CMS-HCC) [G62.9] - prns   Hypothyroid - replace    EKG reviewed, Labs reviewed, Medications reviewed and Radiology images reviewed  Andrade catheter: No Andrade      DVT Prophylaxis: Heparin    Ulcer prophylaxis: Not indicated  Antibiotics: Treating active infection/contamination beyond 24 hours perioperative coverage

## 2017-05-20 NOTE — PROGRESS NOTES
Assumed care of pt from day RN. Pt in bed with strip alarm in place. Call light within reach.  Denies pain or needs at this time.  Will monitor, assist and medicate per MAR for duration of shift.  Hourly rounding implemented.      Pt sleeping. Refusing turns at times.

## 2017-05-20 NOTE — PROGRESS NOTES
Patient resting with bed in lowest locked position. VSS with no acute signs of distress. Call light in reach. Patient denies any further needs at this time. Supra pubic cath in place.

## 2017-05-20 NOTE — CARE PLAN
Problem: Safety  Goal: Will remain free from falls  Outcome: PROGRESSING AS EXPECTED  Fall precautions in place.    Problem: Infection  Goal: Will remain free from infection  Outcome: PROGRESSING SLOWER THAN EXPECTED  Intervention: Assess signs and symptoms of infection  Assessed for signs and symptoms of infection.   Intervention: Implement standard precautions and perform hand washing before and after patient contact  Standard precautions and hand washing performed before and after patient contact per protocol.    Intervention: Assess for removal of potential routes of infection, such as IV, central line, intra-arterial or urinary catheters  Removal of potential routes of infection assessed.

## 2017-05-21 ENCOUNTER — PATIENT MESSAGE (OUTPATIENT)
Dept: NEUROLOGY | Facility: MEDICAL CENTER | Age: 27
End: 2017-05-21

## 2017-05-21 LAB
GLUCOSE BLD-MCNC: 119 MG/DL (ref 65–99)
GLUCOSE BLD-MCNC: 135 MG/DL (ref 65–99)
GLUCOSE BLD-MCNC: 139 MG/DL (ref 65–99)
GLUCOSE BLD-MCNC: 285 MG/DL (ref 65–99)

## 2017-05-21 PROCEDURE — 770006 HCHG ROOM/CARE - MED/SURG/GYN SEMI*

## 2017-05-21 PROCEDURE — 700111 HCHG RX REV CODE 636 W/ 250 OVERRIDE (IP): Performed by: HOSPITALIST

## 2017-05-21 PROCEDURE — 99233 SBSQ HOSP IP/OBS HIGH 50: CPT | Performed by: INTERNAL MEDICINE

## 2017-05-21 PROCEDURE — 700111 HCHG RX REV CODE 636 W/ 250 OVERRIDE (IP): Performed by: INTERNAL MEDICINE

## 2017-05-21 PROCEDURE — 700102 HCHG RX REV CODE 250 W/ 637 OVERRIDE(OP): Performed by: INTERNAL MEDICINE

## 2017-05-21 PROCEDURE — 700102 HCHG RX REV CODE 250 W/ 637 OVERRIDE(OP): Performed by: HOSPITALIST

## 2017-05-21 PROCEDURE — A9270 NON-COVERED ITEM OR SERVICE: HCPCS | Performed by: HOSPITALIST

## 2017-05-21 PROCEDURE — 82962 GLUCOSE BLOOD TEST: CPT | Mod: 91

## 2017-05-21 PROCEDURE — A9270 NON-COVERED ITEM OR SERVICE: HCPCS | Performed by: INTERNAL MEDICINE

## 2017-05-21 RX ORDER — GABAPENTIN 300 MG/1
600 CAPSULE ORAL 3 TIMES DAILY
Status: DISCONTINUED | OUTPATIENT
Start: 2017-05-21 | End: 2017-05-22 | Stop reason: HOSPADM

## 2017-05-21 RX ORDER — SODIUM BICARBONATE 650 MG/1
650 TABLET ORAL 3 TIMES DAILY
Status: DISCONTINUED | OUTPATIENT
Start: 2017-05-21 | End: 2017-05-22 | Stop reason: HOSPADM

## 2017-05-21 RX ADMIN — NITROFURANTOIN MONOHYDRATE AND NITROFURANTOIN MACROCRYSTALLINE 100 MG: 75; 25 CAPSULE ORAL at 17:37

## 2017-05-21 RX ADMIN — STANDARDIZED SENNA CONCENTRATE AND DOCUSATE SODIUM 2 TABLET: 8.6; 5 TABLET, FILM COATED ORAL at 21:14

## 2017-05-21 RX ADMIN — HEPARIN SODIUM 5000 UNITS: 5000 INJECTION, SOLUTION INTRAVENOUS; SUBCUTANEOUS at 14:15

## 2017-05-21 RX ADMIN — LEVOTHYROXINE SODIUM 75 MCG: 75 TABLET ORAL at 06:17

## 2017-05-21 RX ADMIN — INSULIN LISPRO 3 UNITS: 100 INJECTION, SOLUTION INTRAVENOUS; SUBCUTANEOUS at 10:26

## 2017-05-21 RX ADMIN — SODIUM BICARBONATE 650 MG: 650 TABLET ORAL at 14:15

## 2017-05-21 RX ADMIN — HEPARIN SODIUM 5000 UNITS: 5000 INJECTION, SOLUTION INTRAVENOUS; SUBCUTANEOUS at 06:18

## 2017-05-21 RX ADMIN — ZIPRASIDONE HCL 160 MG: 40 CAPSULE ORAL at 17:38

## 2017-05-21 RX ADMIN — OXYCODONE HYDROCHLORIDE AND ACETAMINOPHEN 2 TABLET: 10; 325 TABLET ORAL at 00:16

## 2017-05-21 RX ADMIN — BACLOFEN 10 MG: 10 TABLET ORAL at 06:17

## 2017-05-21 RX ADMIN — METHYLPREDNISOLONE SODIUM SUCCINATE 250 MG: 125 INJECTION, POWDER, FOR SOLUTION INTRAMUSCULAR; INTRAVENOUS at 00:16

## 2017-05-21 RX ADMIN — GABAPENTIN 600 MG: 300 CAPSULE ORAL at 12:24

## 2017-05-21 RX ADMIN — BACLOFEN 10 MG: 10 TABLET ORAL at 14:14

## 2017-05-21 RX ADMIN — STANDARDIZED SENNA CONCENTRATE AND DOCUSATE SODIUM 2 TABLET: 8.6; 5 TABLET, FILM COATED ORAL at 07:38

## 2017-05-21 RX ADMIN — BACLOFEN 10 MG: 10 TABLET ORAL at 21:14

## 2017-05-21 RX ADMIN — ASPIRIN 81 MG: 81 TABLET ORAL at 07:27

## 2017-05-21 RX ADMIN — OXYCODONE HYDROCHLORIDE AND ACETAMINOPHEN 2 TABLET: 10; 325 TABLET ORAL at 21:14

## 2017-05-21 RX ADMIN — FLUOXETINE 10 MG: 10 CAPSULE ORAL at 07:27

## 2017-05-21 RX ADMIN — GABAPENTIN 600 MG: 300 CAPSULE ORAL at 21:14

## 2017-05-21 RX ADMIN — OXYCODONE HYDROCHLORIDE AND ACETAMINOPHEN 2 TABLET: 10; 325 TABLET ORAL at 12:23

## 2017-05-21 RX ADMIN — SODIUM BICARBONATE 650 MG: 650 TABLET ORAL at 12:25

## 2017-05-21 RX ADMIN — METHYLPREDNISOLONE SODIUM SUCCINATE 250 MG: 125 INJECTION, POWDER, FOR SOLUTION INTRAMUSCULAR; INTRAVENOUS at 06:18

## 2017-05-21 RX ADMIN — NITROFURANTOIN MONOHYDRATE AND NITROFURANTOIN MACROCRYSTALLINE 100 MG: 75; 25 CAPSULE ORAL at 07:38

## 2017-05-21 RX ADMIN — GABAPENTIN 600 MG: 300 CAPSULE ORAL at 14:14

## 2017-05-21 RX ADMIN — HEPARIN SODIUM 5000 UNITS: 5000 INJECTION, SOLUTION INTRAVENOUS; SUBCUTANEOUS at 21:14

## 2017-05-21 RX ADMIN — OXYCODONE HYDROCHLORIDE AND ACETAMINOPHEN 2 TABLET: 10; 325 TABLET ORAL at 06:18

## 2017-05-21 ASSESSMENT — PAIN SCALES - GENERAL
PAINLEVEL_OUTOF10: 4
PAINLEVEL_OUTOF10: 9
PAINLEVEL_OUTOF10: 8
PAINLEVEL_OUTOF10: 8

## 2017-05-21 ASSESSMENT — ENCOUNTER SYMPTOMS
HEARTBURN: 0
GASTROINTESTINAL NEGATIVE: 1
RESPIRATORY NEGATIVE: 1
WEAKNESS: 1
BRUISES/BLEEDS EASILY: 0
BLURRED VISION: 0
COUGH: 0
FEVER: 0
EYES NEGATIVE: 1
PSYCHIATRIC NEGATIVE: 1
DIZZINESS: 0
CARDIOVASCULAR NEGATIVE: 1
HEADACHES: 0
DEPRESSION: 0
NECK PAIN: 0
MYALGIAS: 1

## 2017-05-21 NOTE — CARE PLAN
Problem: Pain Management  Goal: Pain level will decrease to patient’s comfort goal  Outcome: PROGRESSING AS EXPECTED  Medicating for back pain per MAR   Ice packs and repositioning for comfort     Problem: Skin Integrity  Goal: Risk for impaired skin integrity will decrease  Outcome: PROGRESSING AS EXPECTED  Encouraging mobilization and turning every 2 hours. Educated about preventing skin break down and pt verbalizes understanding. Repositioned with pillows and draw sheet every 2 hours. Barrier wipes, cream, and lotion in use. Heels floated with pillows.
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
## 2017-05-21 NOTE — PROGRESS NOTES
Pt A&Ox4, flat affect, fatigued, c/o 8/10 pain, medicating per MAR and with ice packs. Cleansed suprapubic cath, urine yellow draining to gravity. Pt [place], c/o N/T to BLE. Diet changed to diabetic, encouraging pt to eat healthier options. Pt reports she wants to d/c home back to her grandparents but does not want to overwhelm her grandparents as he grandfather recently had a stroke.

## 2017-05-21 NOTE — PROGRESS NOTES
Patient's Suprapubic cath cath has been completed using soap and water. Patient denies further needs at this time.

## 2017-05-21 NOTE — PROGRESS NOTES
Patient resting with bed in lowest locked position. VSS with no acute signs of distress. Call light in reach. Patient ambulated to bed for breakfast. Patient denies any further needs at this time. Will continue to monitor.

## 2017-05-21 NOTE — PROGRESS NOTES
Hospital Medicine Progress Note, Adult, Complex               Author: Noman Bruno Date & Time created: 2017  11:56 AM     Interval History:  Participates, now walking. I had a long talk with the patient and her grandmother. I also discussed the case with Dr. Fox.. Significant concern that she is suffering from conversion disorder rather than a true medical condition.  We will consult psychiatry.      Review of Systems:  Review of Systems   Constitutional: Negative for fever.   HENT: Negative.    Eyes: Negative.  Negative for blurred vision.   Respiratory: Negative.  Negative for cough.    Cardiovascular: Negative.  Negative for chest pain.   Gastrointestinal: Negative.  Negative for heartburn.   Genitourinary: Negative.  Negative for dysuria.   Musculoskeletal: Positive for myalgias. Negative for neck pain.   Skin: Negative.  Negative for rash.   Neurological: Positive for weakness. Negative for dizziness and headaches.   Endo/Heme/Allergies: Negative.  Does not bruise/bleed easily.   Psychiatric/Behavioral: Negative.  Negative for depression.       Physical Exam:  Physical Exam  A&Ox3 obese  PERRL EOMI mmm  Supple no jvd bruit or carmelo  RRR no mrg  CTAB PRASANTH  Soft abd ntnd bs+ spc  1+ edema    Labs:        Invalid input(s): [id number]      No results for input(s): SODIUM, POTASSIUM, CHLORIDE, CO2, BUN, CREATININE, MAGNESIUM, PHOSPHORUS, CALCIUM in the last 72 hours.  No results for input(s): ALTSGPT, ASTSGOT, ALKPHOSPHAT, TBILIRUBIN, DBILIRUBIN, GAMMAGT, AMYLASE, LIPASE, ALB, PREALBUMIN, GLUCOSE in the last 72 hours.  No results for input(s): RBC, HEMOGLOBIN, HEMATOCRIT, PLATELETCT, PROTHROMBTM, APTT, INR, IRON, FERRITIN, TOTIRONBC in the last 72 hours.            Hemodynamics:  Temp (24hrs), Av.2 °C (97.1 °F), Min:36.1 °C (97 °F), Max:36.2 °C (97.2 °F)  Temperature: 36.2 °C (97.1 °F)  Pulse  Av.6  Min: 64  Max: 86   Blood Pressure: 117/74 mmHg     Respiratory:    Respiration: 18, Pulse  Oximetry: 97 %        RUL Breath Sounds: Clear, RML Breath Sounds: Diminished, RLL Breath Sounds: Diminished, MARY Breath Sounds: Clear, LLL Breath Sounds: Diminished  Fluids:    Intake/Output Summary (Last 24 hours) at 05/21/17 1156  Last data filed at 05/21/17 0600   Gross per 24 hour   Intake    600 ml   Output   2300 ml   Net  -1700 ml     Weight: 121.8 kg (268 lb 8.3 oz)  GI/Nutrition:  Orders Placed This Encounter   Procedures   • Diet Order     Standing Status: Standing      Number of Occurrences: 1      Standing Expiration Date:      Order Specific Question:  Diet:     Answer:  Diabetic [3]     Medical Decision Making, by Problem:  Active Hospital Problems    Diagnosis   • Depression [F32.9] - prozac   • Schizophrenia (CMS-HCC) [F20.9] - geodon, will have psych see her   • Hashimoto's encephalopathy [E06.3, G93.49] - d/w Dr. Fox, high dose solumedrol x 4 days.  ?Conversion disorder as actual issue?   • Uncontrolled type 2 diabetes mellitus without complication, without long-term current use of insulin (CMS-HCC) [E11.65] - ISS, liraglutide   • Chronic suprapubic catheter (CMS-HCC) [Z93.59] - on macrobid   • HTN (hypertension) [I10] - follow   • Chronic pain syndrome [G89.4] - fentanyl   • 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] - geodon   • GERD (gastroesophageal reflux disease) [K21.9]   • Peripheral neuropathy (CMS-HCC) [G62.9] - prns   Hypothyroid - replace    EKG reviewed, Labs reviewed, Medications reviewed and Radiology images reviewed  Andrade catheter: No Andrade      DVT Prophylaxis: Heparin    Ulcer prophylaxis: Not indicated  Antibiotics: Treating active infection/contamination beyond 24 hours perioperative coverage

## 2017-05-22 ENCOUNTER — TELEPHONE (OUTPATIENT)
Dept: NEUROLOGY | Facility: MEDICAL CENTER | Age: 27
End: 2017-05-22

## 2017-05-22 VITALS
HEART RATE: 82 BPM | TEMPERATURE: 98.5 F | WEIGHT: 268.52 LBS | RESPIRATION RATE: 18 BRPM | OXYGEN SATURATION: 93 % | BODY MASS INDEX: 47.58 KG/M2 | HEIGHT: 63 IN | DIASTOLIC BLOOD PRESSURE: 72 MMHG | SYSTOLIC BLOOD PRESSURE: 121 MMHG

## 2017-05-22 LAB
GLUCOSE BLD-MCNC: 117 MG/DL (ref 65–99)
GLUCOSE BLD-MCNC: 79 MG/DL (ref 65–99)

## 2017-05-22 PROCEDURE — A9270 NON-COVERED ITEM OR SERVICE: HCPCS | Performed by: HOSPITALIST

## 2017-05-22 PROCEDURE — 700102 HCHG RX REV CODE 250 W/ 637 OVERRIDE(OP): Performed by: HOSPITALIST

## 2017-05-22 PROCEDURE — 97116 GAIT TRAINING THERAPY: CPT

## 2017-05-22 PROCEDURE — 700111 HCHG RX REV CODE 636 W/ 250 OVERRIDE (IP): Performed by: HOSPITALIST

## 2017-05-22 PROCEDURE — 99239 HOSP IP/OBS DSCHRG MGMT >30: CPT | Performed by: INTERNAL MEDICINE

## 2017-05-22 PROCEDURE — A9270 NON-COVERED ITEM OR SERVICE: HCPCS | Performed by: INTERNAL MEDICINE

## 2017-05-22 PROCEDURE — 700102 HCHG RX REV CODE 250 W/ 637 OVERRIDE(OP): Performed by: INTERNAL MEDICINE

## 2017-05-22 PROCEDURE — 82962 GLUCOSE BLOOD TEST: CPT

## 2017-05-22 RX ADMIN — PROCHLORPERAZINE EDISYLATE 10 MG: 5 INJECTION INTRAMUSCULAR; INTRAVENOUS at 07:37

## 2017-05-22 RX ADMIN — OXYCODONE HYDROCHLORIDE AND ACETAMINOPHEN 2 TABLET: 10; 325 TABLET ORAL at 03:13

## 2017-05-22 RX ADMIN — LEVOTHYROXINE SODIUM 75 MCG: 75 TABLET ORAL at 05:37

## 2017-05-22 RX ADMIN — BACLOFEN 10 MG: 10 TABLET ORAL at 05:37

## 2017-05-22 RX ADMIN — STANDARDIZED SENNA CONCENTRATE AND DOCUSATE SODIUM 2 TABLET: 8.6; 5 TABLET, FILM COATED ORAL at 07:38

## 2017-05-22 RX ADMIN — SODIUM BICARBONATE 650 MG: 650 TABLET ORAL at 07:38

## 2017-05-22 RX ADMIN — HEPARIN SODIUM 5000 UNITS: 5000 INJECTION, SOLUTION INTRAVENOUS; SUBCUTANEOUS at 05:37

## 2017-05-22 RX ADMIN — OXYCODONE HYDROCHLORIDE AND ACETAMINOPHEN 2 TABLET: 10; 325 TABLET ORAL at 11:09

## 2017-05-22 RX ADMIN — NITROFURANTOIN MONOHYDRATE AND NITROFURANTOIN MACROCRYSTALLINE 100 MG: 75; 25 CAPSULE ORAL at 07:37

## 2017-05-22 RX ADMIN — GABAPENTIN 600 MG: 300 CAPSULE ORAL at 07:35

## 2017-05-22 RX ADMIN — ASPIRIN 81 MG: 81 TABLET ORAL at 07:34

## 2017-05-22 RX ADMIN — FLUOXETINE 10 MG: 10 CAPSULE ORAL at 07:35

## 2017-05-22 ASSESSMENT — PAIN SCALES - GENERAL
PAINLEVEL_OUTOF10: 8
PAINLEVEL_OUTOF10: 8
PAINLEVEL_OUTOF10: 5
PAINLEVEL_OUTOF10: 8

## 2017-05-22 ASSESSMENT — COGNITIVE AND FUNCTIONAL STATUS - GENERAL
MOBILITY SCORE: 24
SUGGESTED CMS G CODE MODIFIER MOBILITY: CH

## 2017-05-22 ASSESSMENT — GAIT ASSESSMENTS
GAIT LEVEL OF ASSIST: SUPERVISED
ASSISTIVE DEVICE: FRONT WHEEL WALKER
DISTANCE (FEET): 300

## 2017-05-22 NOTE — DISCHARGE INSTRUCTIONS
Discharge Instructions    Discharged to home by car with relative. Discharged via wheelchair, hospital escort: Yes.  Special equipment needed: Not Applicable    Be sure to schedule a follow-up appointment with your primary care doctor or any specialists as instructed.     Discharge Plan:   Diet Plan: Discussed  Activity Level: Discussed  Confirmed Follow up Appointment: Appointment Scheduled  Confirmed Symptoms Management: Discussed  Medication Reconciliation Updated: Yes  Influenza Vaccine Indication: Indicated: Not available from distributor/    I understand that a diet low in cholesterol, fat, and sodium is recommended for good health. Unless I have been given specific instructions below for another diet, I accept this instruction as my diet prescription.   Other diet:     Special Instructions: None    · Is patient discharged on Warfarin / Coumadin?   No     · Is patient Post Blood Transfusion?  No    Depression / Suicide Risk    As you are discharged from this [facility] facility, it is important to learn how to keep safe from harming yourself.    Recognize the warning signs:  · Abrupt changes in personality, positive or negative- including increase in energy   · Giving away possessions  · Change in eating patterns- significant weight changes-  positive or negative  · Change in sleeping patterns- unable to sleep or sleeping all the time   · Unwillingness or inability to communicate  · Depression  · Unusual sadness, discouragement and loneliness  · Talk of wanting to die  · Neglect of personal appearance   · Rebelliousness- reckless behavior  · Withdrawal from people/activities they love  · Confusion- inability to concentrate     If you or a loved one observes any of these behaviors or has concerns about self-harm, here's what you can do:  · Talk about it- your feelings and reasons for harming yourself  · Remove any means that you might use to hurt yourself (examples: pills, rope, extension cords,  firearm)  · Get professional help from the community (Mental Health, Substance Abuse, psychological counseling)  · Do not be alone:Call your Safe Contact- someone whom you trust who will be there for you.  · Call your local CRISIS HOTLINE 677-3845 or 191-470-5517  · Call your local Children's Mobile Crisis Response Team [place] (333) 612-5286 or www.[facility]  · Call the toll free National Suicide Prevention Hotlines   · National Suicide Prevention Lifeline 217-901-RERF (1967)  · National Hope Line Network 800-SUICIDE (246-0906)

## 2017-05-22 NOTE — DISCHARGE SUMMARY
Patient discharged home per MD orders. Patient given her discharged instructions and denies further needs at this time. Patient's home meds sent with patient and all personal belongings. IV removed with tip intact. Patient denies any further needs.

## 2017-05-22 NOTE — PROGRESS NOTES
Patient sleeping with bed in lowest locked position. VSS with no acute signs of distress. Patient's call light is in reach. Patient is sleep and appears to have no needs at this time. Will continue to monitor.

## 2017-05-22 NOTE — TELEPHONE ENCOUNTER
Pt is sending this message via [employer] e-mail. Please advise. Thank you. KA      Do we continue with the infusion appointments or cancel them?  Dr. Bruno says the problem is conversion disorder???   She has an appointment on June 2 for infusion - please let me know if you want her to continue with them.  Thank you.     Pablo Palafox

## 2017-05-22 NOTE — PROGRESS NOTES
"Pt fatigued, irritable, flat affect. Pt no participating in any teaching/learning. RN asked about visit with MD today, pt refused to answer, responded \"whatever\", refused to take out ear plugs during conversations with staff. Pt c/o 8/10 back pain, N/T improved to BLE. Tolerating diet, -N/V. Pt agreeable to suprapubic cath care if pain medications could be administered. Pt assisting with turns.   "

## 2017-05-22 NOTE — CARE PLAN
Problem: Knowledge Deficit  Goal: Knowledge of disease process/condition, treatment plan, diagnostic tests, and medications will improve  Outcome: NOT MET  Pt not receptive to learning this evening   Not motivated to learn or to participate in learning/teaching about Conversion Disorder    Problem: Pain Management  Goal: Pain level will decrease to patient’s comfort goal  Outcome: PROGRESSING AS EXPECTED  Medicating for pain per MAR with minimal results  Pt reports pain is tolerable

## 2017-05-23 ENCOUNTER — OFFICE VISIT (OUTPATIENT)
Dept: MEDICAL GROUP | Facility: MEDICAL CENTER | Age: 27
End: 2017-05-23
Payer: MEDICARE

## 2017-05-23 ENCOUNTER — PATIENT OUTREACH (OUTPATIENT)
Dept: HEALTH INFORMATION MANAGEMENT | Facility: OTHER | Age: 27
End: 2017-05-23

## 2017-05-23 VITALS
SYSTOLIC BLOOD PRESSURE: 122 MMHG | TEMPERATURE: 98.1 F | HEIGHT: 63 IN | BODY MASS INDEX: 45.32 KG/M2 | WEIGHT: 255.8 LBS | DIASTOLIC BLOOD PRESSURE: 72 MMHG | RESPIRATION RATE: 16 BRPM | OXYGEN SATURATION: 100 % | HEART RATE: 85 BPM

## 2017-05-23 DIAGNOSIS — E03.9 ACQUIRED HYPOTHYROIDISM: ICD-10-CM

## 2017-05-23 DIAGNOSIS — G93.49 HASHIMOTO'S ENCEPHALOPATHY: ICD-10-CM

## 2017-05-23 DIAGNOSIS — E06.3 HASHIMOTO'S ENCEPHALOPATHY: ICD-10-CM

## 2017-05-23 DIAGNOSIS — E66.01 MORBID OBESITY DUE TO EXCESS CALORIES (HCC): ICD-10-CM

## 2017-05-23 PROCEDURE — G8419 CALC BMI OUT NRM PARAM NOF/U: HCPCS | Performed by: INTERNAL MEDICINE

## 2017-05-23 PROCEDURE — 3045F PR MOST RECENT HEMOGLOBIN A1C LEVEL 7.0-9.0%: CPT | Performed by: INTERNAL MEDICINE

## 2017-05-23 PROCEDURE — 99214 OFFICE O/P EST MOD 30 MIN: CPT | Performed by: INTERNAL MEDICINE

## 2017-05-23 PROCEDURE — G8432 DEP SCR NOT DOC, RNG: HCPCS | Performed by: INTERNAL MEDICINE

## 2017-05-23 PROCEDURE — 1036F TOBACCO NON-USER: CPT | Performed by: INTERNAL MEDICINE

## 2017-05-23 PROCEDURE — 92250 FUNDUS PHOTOGRAPHY W/I&R: CPT | Mod: TC | Performed by: INTERNAL MEDICINE

## 2017-05-23 PROCEDURE — 1111F DSCHRG MED/CURRENT MED MERGE: CPT | Performed by: INTERNAL MEDICINE

## 2017-05-23 NOTE — MR AVS SNAPSHOT
"        Kristin Balderrama   2017 8:20 AM   Office Visit   MRN: [id number]    Department:  [address] [place] [address]   Dept Phone:  [phone]    Description:  Female : 1990   Provider:  Torres Brody M.D.           Allergies as of 2017     Allergen Noted Reactions    Cefdinir 2016   Shortness of Breath, Itching    Tolerated 17    Depakote [Divalproex Sodium] 2010   Unspecified    Muscle spasms/muscle pain and weakness      Abilify 2013   Unspecified    Headaches/muscle twitching      Amitriptyline 10/31/2013   Unspecified    Headaches      Amoxicillin 2010   Rash    Pt states \"I get a rash\".      Ciprofloxacin 2009   Rash    Pt states \"I get a rash\".      Clindamycin 2011   Nausea    Even with food      Doxycycline 08/15/2012   Vomiting, Nausea    RXN=unknown    Ees [Erythromycin] 2010   Vomiting, Nausea    Flagyl [Metronidazole Hcl] 2011   Unspecified    \"eye problems\"      Flomax [Tamsulosin Hydrochloride] 2009   Swelling    Metformin 2013   Unspecified    Increased lactic acid       Sulfa Drugs 2010   Hives, Rash    RXN=since childhood    Tape 08/15/2012   Rash    Tears skin off   coban with Tegaderm tape ok  RXN=ongoing    Vancomycin 07/10/2016   Itching    Pt becomes flushed in face and chest.   RXN=7/10/16    Cephalexin [Keflex] 2017   Rash    Pt states she gets a rash with this medication    Erythromycin 2017       Levofloxacin 10/27/2016   Unspecified    Leg muscle cramps    Metronidazole 2017       Valproic Acid 2017         You were diagnosed with     Uncontrolled type 2 diabetes mellitus without complication, without long-term current use of insulin (CMS-HCC)   [6246350]       Acquired hypothyroidism   [9384558]       Hashimoto's encephalopathy   [778046]       Morbid obesity due to excess calories (CMS-HCC)   [5241633]         Vital Signs     Blood Pressure Pulse Temperature Respirations Height " "Weight    122/72 mmHg 85 36.7 °C (98.1 °F) 16 1.6 m (5' 3\") 116.03 kg (255 lb 12.8 oz)    Body Mass Index Oxygen Saturation Last Menstrual Period Smoking Status          45.32 kg/m2 100% 06/04/2016 Passive Smoke Exposure - Never Smoker        Basic Information     Date Of Birth Sex Race Ethnicity Preferred Language    1990 Female White Non- English      Your appointments     May 31, 2017  9:00 AM   Individual Therapy with Blanca Brantley L.C.S.W.   [facility]    [email or web address]  [address]  [place] [address]   [phone]            Jun 06, 2017  1:30 PM   EST MISC Infusion 2 HR with RN 2   [facility] ([facility])    [address] [facility] [address]  [place] [address]   [phone]            Jul 17, 2017 10:00 AM   Individual Therapy with Blanca Brantley L.C.S.W.   [facility]    [address] [facility] [address]  [address]  [place] [address]   [phone]            Jul 19, 2017  8:40 AM   FOLLOW UP with Torres Kumar M.D.   [facility] (--)    [address]  [place] [id number] [phone]            Aug 11, 2017 10:00 AM   Follow Up Visit with Sandoval Fox M.D.   [facility] (--)    [address], [address]  [place] [id number] [phone]           You will be receiving a confirmation call a few days before your appointment from our automated call confirmation system.            Aug 23, 2017  9:00 AM   Established Patient with Torres Brody M.D.   [facility] [address]    [address]  [address]  [place] [id number] [phone]           You will be receiving a confirmation call a few days before your appointment from our automated call confirmation system.              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 Low  9/18/2010 - Present    " Neurogenic bladder N31.9 Low  4/2/2011 - Present    Sinus tachycardia (Chronic) R00.0 High  10/31/2013 - Present    Knee pain, right M25.561 Low  2/13/2014 - Present    Anxiety F41.9 Low  12/16/2014 - Present    Fatty liver disease, nonalcoholic K76.0 Low  1/19/2015 - Present    Progressive focal motor weakness R53.1 Low  6/28/2015 - Present    Acquired hypothyroidism E03.9 Low  11/23/2015 - Present    PCOS (polycystic ovarian syndrome) E28.2 Low  11/23/2015 - Present    H/O prior ablation treatment Z98.890   2/10/2016 - Present    Peripheral neuropathy (CMS-HCC) (Chronic) G62.9   3/6/2016 - Present    TONYA on CPAP G47.33, Z99.89 Low  3/7/2016 - Present    Morbidly obese (CMS-HCC) E66.01   3/7/2016 - Present    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   3/7/2016 - Present    Scoliosis M41.9   3/7/2016 - Present    GERD (gastroesophageal reflux disease) (Chronic) K21.9   3/7/2016 - Present    Vitamin D deficiency E55.9   5/21/2016 - Present    Chronic inflammatory arthritis (Chronic) M19.90 Medium  5/23/2016 - Present    Weakness of both lower extremities R29.898   6/22/2016 - Present    Elevated sedimentation rate R70.0   6/27/2016 - Present    Galactorrhea O92.6   7/22/2016 - Present    Psychosis, schizophrenia, simple (HCC) (Chronic) F20.89 Low Chronic 9/29/2016 - Present    Schizophrenia (CMS-HCC) F20.9 Low  10/27/2016 - Present    Paralysis (CMS-HCC) G83.9 High  10/27/2016 - Present    Chronic pain syndrome (Chronic) G89.4   10/27/2016 - Present    Bowel and bladder incontinence R32, R15.9   10/27/2016 - Present    Depression F32.9 Low  10/28/2016 - Present    HTN (hypertension) (Chronic) I10   11/1/2016 - Present    Hypovitaminosis D E55.9   11/29/2016 - Present    Leg weakness R29.898   1/4/2017 - Present    Weakness R53.1   1/22/2017 - Present    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 High  1/24/2017 - Present    Chronic suprapubic catheter (CMS-HCC) (Chronic) Z93.59   2/16/2017 - Present    Leg weakness,  bilateral R29.898   2/22/2017 - Present    Weakness of right upper extremity R29.898   2/23/2017 - Present    Chest pain R07.9   3/30/2017 - Present    On home oxygen therapy (Chronic) Z99.81   4/15/2017 - Present    Uncontrolled type 2 diabetes mellitus without complication, without long-term current use of insulin (CMS-[facility]) E11.65   4/26/2017 - Present    Dysuria R30.0   5/9/2017 - Present    Hashimoto's encephalopathy E06.3, G93.49   5/17/2017 - Present      Health Maintenance        Date Due Completion Dates    DIABETES MONOFILAMENT / LE EXAM 4/13/1990 ---    IMM HEP A VACCINE (1 of 2 - Standard Series) 10/13/1990 ---    IMM VARICELLA (CHICKENPOX) VACCINE (1 of 2 - 2 Dose Adolescent Series) 10/13/2002 ---    RETINAL SCREENING 10/13/2007 ---    URINE ACR / MICROALBUMIN 10/13/2007 ---    A1C SCREENING 10/6/2017 4/6/2017, 12/7/2016, 10/14/2016, 3/9/2016, 9/5/2014    FASTING LIPID PROFILE 3/7/2018 3/7/2017, 2/24/2017, 12/7/2016, 10/28/2016, 10/14/2016, 3/21/2014    SERUM CREATININE 5/18/2018 5/18/2017, 5/17/2017, 5/5/2017, 4/14/2017, 4/13/2017, 4/12/2017, 4/5/2017, 3/27/2017, 3/18/2017, 3/17/2017, 3/16/2017, 3/11/2017, 3/10/2017, 3/9/2017, 3/7/2017, 2/25/2017, 2/24/2017, 2/23/2017, 2/22/2017, 1/30/2017, 1/27/2017, 1/25/2017, 1/22/2017, 1/22/2017, 1/18/2017, 1/18/2017, 12/7/2016, 12/6/2016, 11/4/2016, 11/3/2016, 11/2/2016, 11/1/2016, 10/28/2016, 10/27/2016, 10/14/2016, 10/4/2016, 10/3/2016, 9/29/2016, 8/16/2016, 7/28/2016, 7/28/2016, 7/10/2016, 6/25/2016, 6/23/2016, 6/22/2016, 6/7/2016, 5/18/2016, 4/19/2016, 4/3/2016, 3/30/2016, 3/29/2016, 3/28/2016, 3/14/2016, 3/10/2016, 3/9/2016, 3/7/2016, 3/6/2016, 2/15/2016, 2/12/2016, 1/29/2016, 1/28/2016, 1/26/2016, 11/28/2015, 11/27/2015, 11/23/2015, 11/22/2015, 7/9/2015, 7/8/2015, 6/27/2015, 4/14/2015, 3/23/2015, 2/18/2015, 10/27/2014, 9/5/2014, 3/21/2014, 12/24/2013, 1/12/2013, 8/24/2012, 8/23/2012, 8/22/2012, 8/15/2012, 4/21/2012, 4/20/2012, 4/19/2012, 9/17/2011, 4/3/2011,  "4/2/2011, 3/31/2011, 9/20/2010, 9/19/2010, 9/18/2010, 8/28/2010, 7/20/2010, 1/30/2007    PAP SMEAR 7/22/2019 7/22/2016 (Postponed), 2/19/2013 (N/S)    Override on 7/22/2016: Postponed (per pt was told could \"skip\" 2016)    Override on 2/19/2013: (N/S) ([facility])    IMM DTaP/Tdap/Td Vaccine (7 - Td) 8/6/2022 8/6/2012, 9/18/2010, 3/3/1994, 5/17/1991, 5/10/1990, 3/23/1990, 1990    COLONOSCOPY 9/25/2023 9/25/2013            Current Immunizations     Dtap Vaccine 3/3/1994, 5/17/1991, 5/10/1990, 3/23/1990, 1990    HIB Vaccine(PEDVAX) 1/11/1991    HPV Quadrivalent Vaccine (GARDASIL) 10/27/2011, 5/27/2011, 3/1/2011    Hepatitis B Vaccine Non-Recombivax (Ped/Adol) 1/28/2004, 10/28/2003, 10/29/1999    Influenza TIV (IM) 9/5/2013, 12/7/2011, 11/7/2011    Influenza Vaccine Adult HD 10/5/2016    MMR Vaccine 3/3/1994, 1/11/1991    OPV - Historical Data 3/3/1994, 5/17/1991, 5/10/1990, 3/23/1990, 1990    Pneumococcal Vaccine (PCV7) Historical Data 11/8/2015    Pneumococcal polysaccharide vaccine (PPSV-23) 1/23/2017  5:35 PM    TD Vaccine 9/18/2010  4:45 PM    Tdap Vaccine 8/6/2012      Below and/or attached are the medications your provider expects you to take. Review all of your home medications and newly ordered medications with your provider and/or pharmacist. Follow medication instructions as directed by your provider and/or pharmacist. Please keep your medication list with you and share with your provider. Update the information when medications are discontinued, doses are changed, or new medications (including over-the-counter products) are added; and carry medication information at all times in the event of emergency situations     Allergies:  CEFDINIR - Shortness of Breath,Itching     DEPAKOTE - Unspecified     ABILIFY - Unspecified     AMITRIPTYLINE - Unspecified     AMOXICILLIN - Rash     CIPROFLOXACIN - Rash     CLINDAMYCIN - Nausea     DOXYCYCLINE - Vomiting,Nausea     EES - Vomiting,Nausea     FLAGYL - " Unspecified     FLOMAX - Swelling     METFORMIN - Unspecified     SULFA DRUGS - Hives,Rash     TAPE - Rash     VANCOMYCIN - Itching     CEPHALEXIN - Rash     ERYTHROMYCIN - (reactions not documented)     LEVOFLOXACIN - Unspecified     METRONIDAZOLE - (reactions not documented)     VALPROIC ACID - (reactions not documented)               Medications  Valid as of: May 23, 2017 -  8:53 AM    Generic Name Brand Name Tablet Size Instructions for use    Albuterol Sulfate (Aero Soln) albuterol 108 (90 BASE) MCG/ACT Inhale 2 Puffs by mouth every 6 hours as needed for Shortness of Breath.        Aspirin (Tablet Delayed Response) ECOTRIN 81 MG Take 1 Tab by mouth every day.        Baclofen (Tab) LIORESAL 10 MG Take 1 Tab by mouth 3 times a day.        Cranberry   Take 100,400 mg by mouth 2 times a day.        Cyanocobalamin (Tab) vitamin b12 500 MCG Take 1,000 mcg by mouth 2 times a day.        Ergocalciferol (Cap) DRISDOL 74703 UNITS Take 50,000 Units by mouth every Friday.        FentaNYL (PATCH 72 HR) DURAGESIC 25 MCG/HR Apply 1 Patch to skin as directed every 72 hours.        FLUoxetine HCl (Cap) PROZAC 10 MG TAKE ONE CAPSULE BY MOUTH ONCE A DAY        Ivabradine HCl (Tab) CORLANOR 5 MG Take 1 Tab by mouth 2 times a day, with meals.        Lactulose (Solution) lactulose 10 GM/15ML Take 10 g by mouth 2 times a day as needed.        Levothyroxine Sodium (Tab) SYNTHROID 75 MCG Take 75 mcg by mouth every morning.        Liraglutide (Solution Pen-injector) VICTOZA 18 MG/3ML Inject 0.3 mL as instructed every day.        Melatonin (Tab) Melatonin 5 MG Take 10 mg by mouth every bedtime.        Nitroglycerin (SL Tab) NITROSTAT 0.4 MG Place 1 Tab under tongue as needed for Chest Pain.        Omeprazole (CAPSULE DELAYED RELEASE) PRILOSEC 20 MG Take 20 mg by mouth 2 times a day.        Oxycodone-Acetaminophen (Tab) PERCOCET-10  MG Take 2 Tabs by mouth every 6 hours as needed for Severe Pain.        Promethazine HCl (Tab)  PHENERGAN 25 MG Take 1 Tab by mouth every 6 hours as needed for Nausea/Vomiting.        RaNITidine HCl (Tab) ZANTAC 150 MG Take 150 mg by mouth 2 times a day.        SITagliptin Phosphate (Tab) JANUVIA 50 MG Take 1 Tab by mouth every day.        Sodium Bicarbonate (Tab) sodium bicarbonate 325 MG Take 2 Tabs by mouth 3 times a day.        Ziprasidone HCl (Cap) GEODON 80 MG Take 160 mg by mouth every evening. Takes this at 1700 daily        .                 Medicines prescribed today were sent to:     [id number] - [place], [place] - [address] [place] [address]    Phone: [phone] Fax: [phone]    Open 24 Hours?: No      Medication refill instructions:       If your prescription bottle indicates you have medication refills left, it is not necessary to call your provider’s office. Please contact your pharmacy and they will refill your medication.    If your prescription bottle indicates you do not have any refills left, you may request refills at any time through one of the following ways: The online [employer] system (except Urgent Care), by calling your provider’s office, or by asking your pharmacy to contact your provider’s office with a refill request. Medication refills are processed only during regular business hours and may not be available until the next business day. Your provider may request additional information or to have a follow-up visit with you prior to refilling your medication.   *Please Note: Medication refills are assigned a new Rx number when refilled electronically. Your pharmacy may indicate that no refills were authorized even though a new prescription for the same medication is available at the pharmacy. Please request the medicine by name with the pharmacy before contacting your provider for a refill.        Your To Do List     Future Labs/Procedures Complete By Expires    COMP METABOLIC PANEL  As directed 5/24/2018    HEMOGLOBIN A1C  As directed  5/24/2018    LIPID PROFILE  As directed 5/24/2018    MICROALBUMIN CREAT RATIO URINE  As directed 5/24/2018    TSH  As directed 5/24/2018      Other Notes About Your Plan     DME:  [facility] / ph [phone] / fax [phone]              MyChart Access Code: Activation code not generated  Current MyChart Status: Active

## 2017-05-23 NOTE — PROGRESS NOTES
CC: Follow-up hospitalization    HPI:   Kristin presents today with the following.    1. Uncontrolled type 2 diabetes mellitus without complication, without long-term current use of insulin (CMS-HCC)  Presents after another hospitalization. She did receive a steroid infusion week prior to hospitalization for her paresthesias. Still no definitive diagnosis there are some concerns for possible conversion disorder. Blood sugars are still running in the 200 she does not tolerate Victoza at full dose causing nausea. She does tolerate 0.6 dose. Denying any hypoglycemia.    2. Acquired hypothyroidism  Thyroid coming due for recheck at next lab draw.        Patient Active Problem List    Diagnosis Date Noted   • Paraparesis of both lower limbs (CMS-HCC) 01/24/2017     Priority: High   • Paralysis (CMS-HCC) 10/27/2016     Priority: High   • Sinus tachycardia 10/31/2013     Priority: High   • Chronic inflammatory arthritis 05/23/2016     Priority: Medium   • Depression 10/28/2016     Priority: Low   • Schizophrenia (CMS-HCC) 10/27/2016     Priority: Low   • Psychosis, schizophrenia, simple ([facility]) 09/29/2016     Priority: Low     Class: Chronic   • TONYA on CPAP 03/07/2016     Priority: Low   • Acquired hypothyroidism 11/23/2015     Priority: Low   • PCOS (polycystic ovarian syndrome) 11/23/2015     Priority: Low   • Progressive focal motor weakness 06/28/2015     Priority: Low   • Fatty liver disease, nonalcoholic 01/19/2015     Priority: Low   • Anxiety 12/16/2014     Priority: Low   • Knee pain, right 02/13/2014     Priority: Low   • Neurogenic bladder 04/02/2011     Priority: Low   • Chronic UTI 09/18/2010     Priority: Low   • Multiple personality disorder 09/18/2010     Priority: Low   • Hashimoto's encephalopathy 05/17/2017   • Dysuria 05/09/2017   • Uncontrolled type 2 diabetes mellitus without complication, without long-term current use of insulin (CMS-HCC) 04/26/2017   • On home oxygen therapy 04/15/2017   • Chest pain  03/30/2017   • Weakness of right upper extremity 02/23/2017   • Leg weakness, bilateral 02/22/2017   • Chronic suprapubic catheter (CMS-HCC) 02/16/2017   • Weakness 01/22/2017   • Leg weakness 01/04/2017   • Hypovitaminosis D 11/29/2016   • HTN (hypertension) 11/01/2016   • Chronic pain syndrome 10/27/2016   • Bowel and bladder incontinence 10/27/2016   • Galactorrhea 07/22/2016   • Elevated sedimentation rate 06/27/2016   • Weakness of both lower extremities 06/22/2016   • Vitamin D deficiency 05/21/2016   • Morbidly obese (CMS-HCC) 03/07/2016   • Conversion disorder 03/07/2016   • Scoliosis 03/07/2016   • GERD (gastroesophageal reflux disease) 03/07/2016   • Peripheral neuropathy (CMS-HCC) 03/06/2016   • H/O prior ablation treatment 02/10/2016       Current Outpatient Prescriptions   Medication Sig Dispense Refill   • sitagliptin (JANUVIA) 50 MG Tab Take 1 Tab by mouth every day. 30 Tab 11   • oxycodone-acetaminophen (PERCOCET-10)  MG Tab Take 2 Tabs by mouth every 6 hours as needed for Severe Pain.     • promethazine (PHENERGAN) 25 MG Tab Take 1 Tab by mouth every 6 hours as needed for Nausea/Vomiting. 30 Tab 6   • aspirin EC (ECOTRIN) 81 MG Tablet Delayed Response Take 1 Tab by mouth every day. 30 Tab 6   • liraglutide (VICTOZA) 18 MG/3ML Solution Pen-injector injection Inject 0.3 mL as instructed every day. 3 PEN 11   • baclofen (LIORESAL) 10 MG Tab Take 1 Tab by mouth 3 times a day. 90 Tab 6   • CRANBERRY PO Take 100,400 mg by mouth 2 times a day.     • ivabradine (CORLANOR) 5 MG Tab tablet Take 1 Tab by mouth 2 times a day, with meals. 60 Tab 6   • fluoxetine (PROZAC) 10 MG Cap TAKE ONE CAPSULE BY MOUTH ONCE A DAY 30 Cap 2   • ranitidine (ZANTAC) 150 MG Tab Take 150 mg by mouth 2 times a day.     • nitroglycerin (NITROSTAT) 0.4 MG SL Tab Place 1 Tab under tongue as needed for Chest Pain. 25 Tab 1   • Melatonin 5 MG Tab Take 10 mg by mouth every bedtime.     • ziprasidone (GEODON) 80 MG Cap Take 160 mg  "by mouth every evening. Takes this at 1700 daily     • fentanyl (DURAGESIC) 25 MCG/HR PATCH 72 HR Apply 1 Patch to skin as directed every 72 hours.     • lactulose 10 GM/15ML Solution Take 10 g by mouth 2 times a day as needed.     • vitamin D, Ergocalciferol, (DRISDOL) 83990 UNITS Cap capsule Take 50,000 Units by mouth every Friday.     • albuterol 108 (90 BASE) MCG/ACT Aero Soln inhalation aerosol Inhale 2 Puffs by mouth every 6 hours as needed for Shortness of Breath.     • cyanocobalamin (HM VITAMIN B12) 500 MCG tablet Take 1,000 mcg by mouth 2 times a day.     • sodium bicarbonate 325 MG Tab Take 2 Tabs by mouth 3 times a day.     • levothyroxine (SYNTHROID) 75 MCG Tab Take 75 mcg by mouth every morning.     • omeprazole (PRILOSEC) 20 MG delayed-release capsule Take 20 mg by mouth 2 times a day.       No current facility-administered medications for this visit.         Allergies as of 05/23/2017 - Joe as Reviewed 05/23/2017   Allergen Reaction Noted   • Cefdinir Shortness of Breath and Itching 03/01/2016   • Depakote [divalproex sodium] Unspecified 06/14/2010   • Abilify Unspecified 01/17/2013   • Amitriptyline Unspecified 10/31/2013   • Amoxicillin Rash 09/18/2010   • Ciprofloxacin Rash 12/17/2009   • Clindamycin Nausea 02/02/2011   • Doxycycline Vomiting and Nausea 08/15/2012   • Ees [erythromycin] Vomiting and Nausea 08/28/2010   • Flagyl [metronidazole hcl] Unspecified 03/31/2011   • Flomax [tamsulosin hydrochloride] Swelling 09/24/2009   • Metformin Unspecified 07/23/2013   • Sulfa drugs Hives and Rash 09/18/2010   • Tape Rash 08/15/2012   • Vancomycin Itching 07/10/2016   • Cephalexin [keflex] Rash 01/01/2017   • Erythromycin  03/30/2017   • Levofloxacin Unspecified 10/27/2016   • Metronidazole  03/30/2017   • Valproic acid  03/30/2017        ROS: As per HPI.    /72 mmHg  Pulse 85  Temp(Src) 36.7 °C (98.1 °F)  Resp 16  Ht 1.6 m (5' 3\")  Wt 116.03 kg (255 lb 12.8 oz)  BMI 45.32 kg/m2  SpO2 " 100%  LMP 06/04/2016    Physical Exam:  Gen:         Alert and oriented, No apparent distress.  Neck:        No Lymphadenopathy or Bruits.  Lungs:     Clear to auscultation bilaterally  CV:          Regular rate and rhythm. No murmurs, rubs or gallops.               Ext:          No clubbing, cyanosis, edema.      Assessment and Plan.   26 y.o. female with the following issues.    1. Uncontrolled type 2 diabetes mellitus without complication, without long-term current use of insulin (CMS-HCC)  Have added low-dose Januvia she will follow with kidney specialist recheck blood work in 3 months.  - sitagliptin (JANUVIA) 50 MG Tab; Take 1 Tab by mouth every day.  Dispense: 30 Tab; Refill: 11  - POCT Retinal Eye Exam  - COMP METABOLIC PANEL; Future  - LIPID PROFILE; Future  - MICROALBUMIN CREAT RATIO URINE; Future  - HEMOGLOBIN A1C; Future    2. Acquired hypothyroidism  Recheck next lab draw.      - TSH; Future

## 2017-05-23 NOTE — DISCHARGE SUMMARY
DATE OF ADMISSION:  05/17/2017    DATE OF DISCHARGE:  05/22/2017    FINAL DIAGNOSES:  1. Very high suspicion conversion disorder, with patient having recurrent   presentations of leg paresis, which resolves, she goes home and returns weeks   later with the same complaints.  2.  No medical diagnosis to support such symptomatology, status post extensive   workups.  3.  Tentative diagnosis of Hashimoto's encephalitis, the patient not following   the patterns that fits this diagnosis.  4.  Support by Dr. Sandoval Fox, significant psychiatric component to her   leg weakness.  5.  Depression and psychosis, followed by a psychiatrist, Dr. Burnette in   the outpatient setting.  6.  Hypothyroidism.  7.  Suprapubic catheter, unclear indication.  8.  Neuropathy.  9.  Muscle spasms.  10.  Morbid obesity with cushingoid presentation, numerous prior exposures to   steroids in the interest of treating her paresis.  11.  Diabetes mellitus with hyperglycemia in the setting of high-dose   steroids, unclear if actually diabetic off steroids.  12.  Hypertension.  13.  Chronic pain.  14.  Acid reflux.    HISTORY OF PRESENT ILLNESS:  The patient is a 26-year-old female who I have   known for over 10 years.  She had a complex medical presentation with   significant psychiatric overlay for which she carries a diagnosis of psychosis   and depression with possible schizophrenia, is followed by Dr. Burnette in   the outpatient setting.  She has been worked up for multiple bouts of leg   weakness and paraplegia.  These were workups had been extensive and she has   been even worked up for possible mitochondrial disease.  She has had numerous   admissions.  At this point, there was a tentative diagnosis of Hashimoto's   thyroiditis, given by Dr. Sandoval Fox, but she does not entirely follow   the pattern for this.  I discussed the case at length with Dr. Fox.  At   this point, conversion disorder is very high on the differential  diagnosis.    Initial response rate to brief hospitalization is quite characteristic of   this.  She has been tentatively diagnosed with this in the past by hospital   Psychiatrist Dr. Shannan Renee as well.  There is also a feature mood   related to paraplegia, which does not seem to bother her and at least she   seems to enjoy being a patient.  She has been cautioned about the dangers of   continued high-dose steroid exposure including Cushing syndrome and obesity as   well sugar intolerance, it seems to be rather poor.  We did consult   psychiatry which also supports the diagnosis of conversion disorder during his   hospital stay.  Hopefully, she will follow up with psychiatrist, Dr. Burnette in the outpatient setting and achieve some control of this rather   distressing problem.    DISCHARGE CONDITION:  Guarded given psychiatric condition, but medically   stable.    DISCHARGE DIET:  Low fat, diabetic and heart healthy.    DISCHARGE ACTIVITY:  As able.    DISCHARGE MEDICATIONS:  1.  Duragesic patch 25 mcg, changed every third day.  2.  Percocet 10/325 two tabs every 6 hours as needed a 1 month supply of any   of these medications.  3.  Aspirin 81 mg daily.  4.  Sodium bicarbonate 650 mg 3 times daily.  5.  Nitroglycerin as needed.  6.  Albuterol 2 puffs every 6 hours as needed.  7.  Prozac 10 mg daily.  8.  Victoza 1.8 mg injection daily.  9.  Phenergan on an as needed basis for nausea.  10.  Geodon 160 mg nightly.  11.  Ivabradine 5 mg twice daily.  12.  B12 1000 mcg daily.  13.  Lactulose as needed.  14.  Cranberry tabs supplement.  15.  Melatonin supplements.  16.  Baclofen 10 mg 3 times daily.  17.  Synthroid 75 mcg daily.  18.  Prilosec 20 mg daily.  19.  Vitamin D 50,000 units daily.     LABORATORY DATA:  TSH 2.6, blood counts within normal limits.  Electrolytes   within normal limits with exception of elevated sugars.  Liver function tests   within normal limits.    CONSULTS:  Psychiatry,   _____ and Dr. Renzo Medrano.    PROCEDURES:  None.    OTHER CONSULTS:  Dr. Kell Fox.    FOLLOWUP:  She will follow up with psychiatrist, Dr. Junior in the next 1   week.  She is to see her primary care provider also the next 1 week.    Discharge time today is 35 minutes.       ____________________________________     MD TALHA IBARRA / CHRISTELLE    DD:  05/22/2017 09:39:13  DT:  05/22/2017 21:57:27    D#:  [id number]  Job#:  [id number]    cc: Renzo Medrano MD, KELL FOX MD, IMANI JUNIOR MD

## 2017-05-24 ENCOUNTER — TELEPHONE (OUTPATIENT)
Dept: MEDICAL GROUP | Facility: MEDICAL CENTER | Age: 27
End: 2017-05-24

## 2017-05-24 ENCOUNTER — PATIENT OUTREACH (OUTPATIENT)
Dept: HEALTH INFORMATION MANAGEMENT | Facility: OTHER | Age: 27
End: 2017-05-24

## 2017-05-24 DIAGNOSIS — R29.898 WEAKNESS OF BOTH LOWER EXTREMITIES: ICD-10-CM

## 2017-05-24 NOTE — TELEPHONE ENCOUNTER
1. Caller Name: Kristin Balderrama                                           Call Back Number: [phone] (home)         Patient approves a detailed voicemail message: N\A    2. SPECIFIC Action To Be Taken: Orders Needed for Rehab Patient stated that she just got out of the hospital and feels both of her legs becoming weak and does not want to fall, she wanted to know if you would place orders for the patient to go to rehab to [facility]? Please advise    3. Diagnosis/Clinical Reason for Request: Weakness in both legs    4. Specialty & Provider Name/Lab/Imaging Location: [facility]    5. Is appointment scheduled for requested order/referral: NO

## 2017-05-24 NOTE — TELEPHONE ENCOUNTER
Patient has been advised about the message and has declined the referral to physical therapy and insisted on going to rehab and has scheduled an appointment to come in and discuss rehab with the doctor.

## 2017-05-24 NOTE — PROGRESS NOTES
· 5/24/17 at 11:11 AM--Received phone call from Caterina at [facility].  Caterina states that pt is requesting to speak with a  regarding receiving IP versus OP PT.  Provided Caterina with contact phone number for LSW for [facility], [phone].

## 2017-05-24 NOTE — TELEPHONE ENCOUNTER
1. Caller Name: Kristin Balderrama                                           Call Back Number: [phone] (home)         Patient approves a detailed voicemail message: N\A    2. SPECIFIC Action To Be Taken: Referral for Physical Therapy, Patient called back and has decided to go to outside physical therapy, patient stated that she is having weakness in both legs and would like a referral to physical therapy? Please advise    3. Diagnosis/Clinical Reason for Request: Weakness in both legs    4. Specialty & Provider Name/Lab/Imaging Location: N/A    5. Is appointment scheduled for requested order/referral: NO

## 2017-05-25 ENCOUNTER — APPOINTMENT (OUTPATIENT)
Dept: MEDICAL GROUP | Facility: MEDICAL CENTER | Age: 27
End: 2017-05-25
Payer: MEDICARE

## 2017-05-25 ENCOUNTER — HOSPITAL ENCOUNTER (EMERGENCY)
Facility: MEDICAL CENTER | Age: 27
End: 2017-05-25
Attending: EMERGENCY MEDICINE
Payer: MEDICARE

## 2017-05-25 VITALS
WEIGHT: 255 LBS | HEIGHT: 65 IN | DIASTOLIC BLOOD PRESSURE: 89 MMHG | HEART RATE: 99 BPM | BODY MASS INDEX: 42.49 KG/M2 | RESPIRATION RATE: 16 BRPM | SYSTOLIC BLOOD PRESSURE: 144 MMHG | TEMPERATURE: 97.6 F

## 2017-05-25 DIAGNOSIS — R53.1 SUBJECTIVE WEAKNESS: ICD-10-CM

## 2017-05-25 DIAGNOSIS — F44.9 CONVERSION DISORDER: ICD-10-CM

## 2017-05-25 PROCEDURE — 99283 EMERGENCY DEPT VISIT LOW MDM: CPT

## 2017-05-25 ASSESSMENT — PAIN SCALES - GENERAL: PAINLEVEL_OUTOF10: 8

## 2017-05-25 NOTE — ED NOTES
Pt c/o leg pain. CMS intact. Pt on home oxygen. Denies any increased SOB. Andrade in place from previous hospitalization. Discharge instructions provided.  Pt verbalized the understanding of discharge instructions to follow up with PCP and to return to ER if condition worsens.  Pt assisted to wheelchair and wheeled out of ER without difficulty.

## 2017-05-25 NOTE — ED AVS SNAPSHOT
W[place]waa Access Code: Activation code not generated  Current W[place]waa Status: Active    [employer]hart  A secure, online tool to manage your health information     [employer]’s W[place]waa® is a secure, online tool that connects you to your personalized health information from the privacy of your home -- day or night - making it very easy for you to manage your healthcare. Once the activation process is completed, you can even access your medical information using the Wauwaa rocio, which is available for free in the Apple Rocio store or Google Play store.     Wauwaa provides the following levels of access (as shown below):   My Chart Features   [facility] Primary Care Doctor [facility]  Specialists [facility]  Urgent  Care Non-[facility]  Primary Care  Doctor   Email your healthcare team securely and privately 24/7 X X X X   Manage appointments: schedule your next appointment; view details of past/upcoming appointments X      Request prescription refills. X      View recent personal medical records, including lab and immunizations X X X X   View health record, including health history, allergies, medications X X X X   Read reports about your outpatient visits, procedures, consult and ER notes X X X X   See your discharge summary, which is a recap of your hospital and/or ER visit that includes your diagnosis, lab results, and care plan. X X       How to register for W[place]waa:  1. Go to  [email or web address].  2. Click on the Sign Up Now box, which takes you to the New Member Sign Up page. You will need to provide the following information:  a. Enter your W[place]aa Access Code exactly as it appears at the top of this page. (You will not need to use this code after you’ve completed the sign-up process. If you do not sign up before the expiration date, you must request a new code.)   b. Enter your date of birth.   c. Enter your home email address.   d. Click Submit, and follow the next screen’s instructions.  3. Create a W[place]waa ID. This will  be your Konjekt login ID and cannot be changed, so think of one that is secure and easy to remember.  4. Create a Konjekt password. You can change your password at any time.  5. Enter your Password Reset Question and Answer. This can be used at a later time if you forget your password.   6. Enter your e-mail address. This allows you to receive e-mail notifications when new information is available in Konjekt.  7. Click Sign Up. You can now view your health information.    For assistance activating your Konjekt account, call [phone]

## 2017-05-25 NOTE — ED AVS SNAPSHOT
5/25/2017    Kristin Balderrama  [address] [address]    Dear Kristin:    [facility] wants to ensure your discharge home is safe and you or your loved ones have had all of your questions answered regarding your care after you leave the hospital.    Below is a list of resources and contact information should you have any questions regarding your hospital stay, follow-up instructions, or active medical symptoms.    Questions or Concerns Regarding… Contact   Medical Questions Related to Your Discharge  (7 days a week, 8am-5pm) Contact a Nurse Care Coordinator   [phone]   Medical Questions Not Related to Your Discharge  (24 hours a day / 7 days a week)  Contact the Nurse Health Line   [phone]    Medications or Discharge Instructions Refer to your discharge packet   or contact your [facility] Primary Care Provider   [phone]   Follow-up Appointment(s) Schedule your appointment via [employer]   or contact Scheduling [phone]   Billing Review your statement via [employer]  or contact Billing [phone]   Medical Records Review your records via [employer]   or contact Medical Records [phone]     You may receive a telephone call within two days of discharge. This call is to make certain you understand your discharge instructions and have the opportunity to have any questions answered. You can also easily access your medical information, test results and upcoming appointments via the [employer]oGen free online health management tool. You can learn more and sign up at [email or web address]/AxoGen. For assistance setting up your [employer]oGen account, please call [phone].    Once again, we want to ensure your discharge home is safe and that you have a clear understanding of any next steps in your care. If you have any questions or concerns, please do not hesitate to contact us, we are here for you. Thank you for choosing [facility] for your healthcare needs.    Sincerely,    Your [facility] Healthcare Team

## 2017-05-25 NOTE — ED NOTES
"Co lower leg weakness, she was discharged from [facility] 5/22 with this same issue. Arrived pulled from car to a wheelchair carrying a meal and her jara bag, wearing a nasal cannula not hooked to any O2. She states her illness is \"coversion dis order\". Her family and phychiatric doesn't believe this DX. Family dropped and left per patient.  "

## 2017-05-25 NOTE — ED PROVIDER NOTES
"ED Provider Note    CHIEF COMPLAINT  Chief Complaint   Patient presents with   • Extremity Weakness       [facility]  Kristin Balderrama is a 26 y.o. female who presents to the emergency department with chief complaint of bilateral lower extremity weakness. The patient states that she has leg weakness. She states that her family is unable to take care of her as she is. She would like to be admitted to the skilled nursing facility. She states that she has been diagnosed with conversion disorder. She denies any new or different symptoms. She reports ongoing lower extremity weakness.    REVIEW OF SYSTEMS  See HPI for further details. All other systems are negative.     PAST MEDICAL HISTORY  Past Medical History   Diagnosis Date   • Scoliosis    • Multiple personality disorder    • Chronic UTI 9/18/2010   • Heart burn    • Pain 08-15-12     back, 7/10   • History of falling    • Sinus tachycardia 10/31/2013   • Urinary incontinence    • Hypertension    • Disorder of thyroid    • Obesity    • Pneumonia 2012   • ASTHMA      when around smoke   • Breath shortness      with tachycardia   • Anginal syndrome      random chest pain especially with tachycardia   • Psychosis    • Arthritis      osteo   • PCOS (polycystic ovarian syndrome)    • Gynecological disorder      PCOS   • Renal disorder      \"kidney disease, stage 1\" nephrologist, Dr. Vallejo   • Arrhythmia      \"sinus tachycardia\", cariologist, Dr. 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   • Sleep apnea      CPAP \"pulmonary doctor took me off mid year 2016\"   • Mitochondrial disease    • Fall        FAMILY HISTORY  Family History   Problem Relation Age of Onset   • Hypertension Mother    • Sleep Apnea Mother    • Heart Disease Mother    • Other Mother      hypothryod   • Hypertension Maternal Uncle    • Heart Disease Maternal Grandmother    • Hypertension Maternal Grandmother  "   • Other Sister      Narcolepsy;fibromyalsia;bone;nerve   • Genitourinary () Sister      endometriosis       SOCIAL HISTORY  Social History     Social History   • Marital Status: Single     Spouse Name: N/A   • Number of Children: N/A   • Years of Education: N/A     Social History Main Topics   • Smoking status: Passive Smoke Exposure - Never Smoker     Types: Cigarettes   • Smokeless tobacco: Never Used   • Alcohol Use: No   • Drug Use: No   • Sexual Activity: Not Currently     Birth Control/ Protection: Pill     Other Topics Concern   • Not on file     Social History Narrative    ** Merged History Encounter **            SURGICAL HISTORY  Past Surgical History   Procedure Laterality Date   • Neuro dest facet l/s w/ig sngl  4/21/2015     Performed by Reza Tabor at [facility]   • Recovery  1/27/2016     Procedure: CATH LAB EP STUDY WITH SINUS NODE MODIFICATION ABHINAV;  Surgeon: Recoveryonly Surgery;  [place] [facility]  Service:    • Katie by laparoscopy  8/29/2010     Performed by SHAYY JOHNS at [facility]   • Lumbar fusion anterior  8/21/2012     Performed by SUSIE SAWANT at [facility]   • Other cardiac surgery  1/2016     cardiac ablation   • Tonsillectomy       tonsillectomy   • Bowel stimulator insertion  7/13/2016     Procedure: BOWEL STIMULATOR INSERTION FOR PERMANENT INTERSTIM SACRAL IMPLANT;  Surgeon: Joe Noyola M.D.;  Location: [facility];  Service:    • Gastroscopy with balloon dilatation N/A 1/4/2017     Procedure: GASTROSCOPY WITH DILATATION;  Surgeon: Torres Vargas M.D.;  Location: [facility];  Service:    • Muscle biopsy Right 1/26/2017     Procedure: MUSCLE BIOPSY - THIGH;  Surgeon: Isidro Vigil M.D.;  [facility] SURGERY [facility];  Service:    • Other abdominal surgery         CURRENT MEDICATIONS  Home Medications     Reviewed by Ulisses Lucio (Pharmacy Tech) on 05/25/17 at  "1035  Med List Status: Complete    Medication Last Dose Status    albuterol 108 (90 BASE) MCG/ACT Aero Soln inhalation aerosol 5/25/2017 Active    aspirin EC (ECOTRIN) 81 MG Tablet Delayed Response 5/25/2017 Active    baclofen (LIORESAL) 10 MG Tab 5/25/2017 Active    CRANBERRY PO 5/25/2017 Active    cyanocobalamin (HM VITAMIN B12) 500 MCG tablet 5/25/2017 Active    fentanyl (DURAGESIC) 25 MCG/HR PATCH 72 HR 5/22/2017 Active    fluoxetine (PROZAC) 10 MG Cap 5/25/2017 Active    ivabradine (CORLANOR) 5 MG Tab tablet 5/25/2017 Active    lactulose 10 GM/15ML Solution 5/25/2017 Active    levothyroxine (SYNTHROID) 75 MCG Tab 5/25/2017 Active    liraglutide (VICTOZA) 18 MG/3ML Solution Pen-injector injection 5/25/2017 Active    Melatonin 5 MG Tab 5/24/2017 Active    Mirabegron ER (MYRBETRIQ) 25 MG TABLET SR 24 HR 5/25/2017 Active    omeprazole (PRILOSEC) 20 MG delayed-release capsule 5/25/2017 Active    oxycodone-acetaminophen (PERCOCET-10)  MG Tab 5/25/2017 Active    promethazine (PHENERGAN) 25 MG Tab 5/24/2017 Active    ranitidine (ZANTAC) 150 MG Tab 5/25/2017 Active    sitagliptin (JANUVIA) 50 MG Tab 5/25/2017 Active    sodium bicarbonate 325 MG Tab 5/25/2017 Active    vitamin D, Ergocalciferol, (DRISDOL) 77494 UNITS Cap capsule 5/19/2017 Active    ziprasidone (GEODON) 80 MG Cap 5/24/2017 Active                ALLERGIES  Allergies   Allergen Reactions   • Cefdinir Shortness of Breath and Itching     Tolerated 1/18/17   • Depakote [Divalproex Sodium] Unspecified     Muscle spasms/muscle pain and weakness     • Abilify Unspecified     Headaches/muscle twitching     • Amitriptyline Unspecified     Headaches     • Amoxicillin Rash     Pt states \"I get a rash\".     • Ciprofloxacin Rash     Pt states \"I get a rash\".     • Clindamycin Nausea     Even with food     • Doxycycline Vomiting and Nausea     RXN=unknown   • Ees [Erythromycin] Vomiting and Nausea   • Flagyl [Metronidazole Hcl] Unspecified     \"eye problems\"     • " "Flomax [Tamsulosin Hydrochloride] Swelling   • Metformin Unspecified     Increased lactic acid      • Sulfa Drugs Hives and Rash     RXN=since childhood   • Tape Rash     Tears skin off   coban with Tegaderm tape ok  RXN=ongoing   • Vancomycin Itching     Pt becomes flushed in face and chest.   RXN=7/10/16   • Cephalexin [Keflex] Rash     Pt states she gets a rash with this medication   • Erythromycin    • Levofloxacin Unspecified     Leg muscle cramps   • Metronidazole    • Valproic Acid        PHYSICAL EXAM  VITAL SIGNS: /89 mmHg  Pulse 99  Temp(Src) 36.4 °C (97.6 °F)  Resp 16  Ht 1.651 m (5' 5\")  Wt 115.667 kg (255 lb)  BMI 42.43 kg/m2  LMP 06/04/2016  Constitutional: Well developed, Well nourished, No acute distress, Non-toxic appearance.   HENT: Normocephalic, atraumatic, somewhat cushingoid  Eyes: Pupils are equal round and reactive to light. Neck shotty movements are intact.  Neck: Normal range of motion, No tenderness, Supple, No stridor.   Cardiovascular: Normal heart rate, Normal rhythm, No murmurs, No rubs, No gallops.   Thorax & Lungs: Normal breath sounds, No respiratory distress, No wheezing, No chest tenderness.   Abdomen: Bowel sounds normal, Soft, No tenderness, No masses, No pulsatile masses.   Skin: Warm, Dry, No erythema, No rash.   Back: No tenderness, No CVA tenderness.   Extremities: Intact distal pulses, No edema, No tenderness, No cyanosis, No clubbing.   Neurologic: The patient seems to refuse to move her lower extremities. Spontaneous movement of bilateral lower sternum is noted. The patient is eating fast food at the time of evaluation. Normal speech and language.  Psychiatric: Odd affect    EKG      RADIOLOGY/PROCEDURES      COURSE & MEDICAL DECISION MAKING  Pertinent Labs & Imaging studies reviewed. (See chart for details)    I have extensively reviewed the electronic medical record including her previous workup, and neurology consultation. I feel the most accurate " diagnosis for this patient as conversion disorder. At this time there is no acute changes. She has ongoing weakness. I do not feel that additional evaluation in the emergency department her hospitalization is indicated. The patient will be discharged home to follow-up with her primary care provider.    FINAL IMPRESSION  1. Conversion disorder    2. Subjective weakness              Electronically signed by: Josafat Leung, 5/25/2017 3:05 PM

## 2017-05-25 NOTE — ED AVS SNAPSHOT
Home Care Instructions                                                                                                                Kristin Balderrama   MRN: [id number]    Department:  [facility], Emergency Dept   Date of Visit:  5/25/2017            [facility], Emergency Dept    [address]    [place] [address]    Phone:  [phone]      You were seen by     Josafat Leung M.D.      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 Schedule an appointment as soon as possible for a visit with Ronny Burnette M.D..    Specialty:  Psychiatry    Contact information    [address]  [address]  [place] [address] [phone]        Medication Information     Review all of your home medications and newly ordered medications with your primary doctor and/or pharmacist as soon as possible. Follow medication instructions as directed by your doctor and/or pharmacist.     Please keep your complete medication list with you and share with your physician. Update the information when medications are discontinued, doses are changed, or new medications (including over-the-counter products) are added; and carry medication information at all times in the event of emergency situations.               Medication List      ASK your doctor about these medications        Instructions    Morning Afternoon Evening Bedtime    albuterol 108 (90 BASE) MCG/ACT Aers inhalation aerosol        Inhale 2 Puffs by mouth every 6 hours as needed for Shortness of Breath.   Dose:  2 Puff                        aspirin EC 81 MG Tbec   Commonly known as:  ECOTRIN        Take 1 Tab by mouth every day.   Dose:  81 mg                        baclofen 10 MG Tabs   Commonly known as:  LIORESAL        Take 1 Tab by mouth 3 times a day.   Dose:  10 mg                        CRANBERRY PO        Take 100,400 mg by mouth 2 times a day.   Dose:  019075 mg                           fentanyl 25 MCG/HR Pt72   Commonly known as:  DURAGESIC        Apply 1 Patch to skin as directed every 72 hours.   Dose:  1 Patch                        fluoxetine 10 MG Caps   Commonly known as:  PROZAC        TAKE ONE CAPSULE BY MOUTH ONCE A DAY                        HM VITAMIN B12 500 MCG tablet   Generic drug:  cyanocobalamin        Take 1,000 mcg by mouth 2 times a day.   Dose:  1000 mcg                        ivabradine 5 MG Tabs tablet   Commonly known as:  CORLANOR        Take 1 Tab by mouth 2 times a day, with meals.   Dose:  5 mg                        lactulose 10 GM/15ML Soln        Take 10 g by mouth 2 times a day as needed.   Dose:  10 g                        levothyroxine 75 MCG Tabs   Commonly known as:  SYNTHROID        Take 75 mcg by mouth every morning.   Dose:  75 mcg                        liraglutide 18 MG/3ML Sopn injection   Commonly known as:  VICTOZA        Inject 0.3 mL as instructed every day.   Dose:  1.8 mg                        Melatonin 5 MG Tabs        Doctor's comments:  Takes two tabs at Bedtime (10 mg.)   Take 10 mg by mouth every bedtime.   Dose:  10 mg                        MYRBETRIQ 25 MG Tb24   Generic drug:  Mirabegron ER        Take 1 Tab by mouth every day.   Dose:  1 Tab                        omeprazole 20 MG delayed-release capsule   Commonly known as:  PRILOSEC        Take 20 mg by mouth 2 times a day.   Dose:  20 mg                        oxycodone-acetaminophen  MG Tabs   Commonly known as:  PERCOCET-10        Take 2 Tabs by mouth every 6 hours as needed for Severe Pain.   Dose:  2 Tab                        promethazine 25 MG Tabs   Commonly known as:  PHENERGAN        Take 1 Tab by mouth every 6 hours as needed for Nausea/Vomiting.   Dose:  25 mg                        ranitidine 150 MG Tabs   Commonly known as:  ZANTAC        Take 150 mg by mouth 2 times a day.   Dose:  150 mg                        sitagliptin 50 MG Tabs   Commonly known as:   JANUVIA        Take 1 Tab by mouth every day.   Dose:  50 mg                        sodium bicarbonate 325 MG Tabs        Take 2 Tabs by mouth 3 times a day.   Dose:  650 mg                        vitamin D (Ergocalciferol) 75883 UNITS Caps capsule   Commonly known as:  DRISDOL        Take 50,000 Units by mouth every Friday.   Dose:  19374 Units                        ziprasidone 80 MG Caps   Commonly known as:  GEODON        Take 160 mg by mouth every evening. Takes this at 1700 daily   Dose:  160 mg                                Patient Information     Patient Information    Following emergency treatment: all patient requiring follow-up care must return either to a private physician or a clinic if your condition worsens before you are able to obtain further medical attention, please return to the emergency room.     Billing Information    At [facility], we work to make the billing process streamlined for our patients.  Our Representatives are here to answer any questions you may have regarding your hospital bill.  If you have insurance coverage and have supplied your insurance information to us, we will submit a claim to your insurer on your behalf.  Should you have any questions regarding your bill, we can be reached online or by phone as follows:  Online: You are able pay your bills online or live chat with our representatives about any billing questions you may have. We are here to help Monday - Friday from 8:00am to 7:30pm and 9:00am - 12:00pm on Saturdays.  Please visit [email or web address]  for more information.   Phone:  [phone] or [phone]    Please note that your emergency physician, surgeon, pathologist, radiologist, anesthesiologist, and other specialists are not employed by [facility] and will therefore bill separately for their services.  Please contact them directly for any questions concerning their bills at the numbers below:     Emergency Physician  Services:  [phone]  [facility]:  [phone]  [facility]:  [phone]  [facility]:  [phone]    1. Your final bill may vary from the amount quoted upon discharge if all procedures are not complete at that time, or if your doctor has additional procedures of which we are not aware. You will receive an additional bill if you return to the Emergency Department at [facility] for suture removal regardless of the facility of which the sutures were placed.     2. Please arrange for settlement of this account at the emergency registration.    3. All self-pay accounts are due in full at the time of treatment.  If you are unable to meet this obligation then payment is expected within 4-5 days.     4. If you have had radiology studies (CT, X-ray, Ultrasound, MRI), you have received a preliminary result during your emergency department visit. Please contact the radiology department [phone] to receive a copy of your final result. Please discuss the Final result with your primary physician or with the follow up physician provided.     Crisis Hotline:  [place] Crisis Hotline:  3-833-KAISEHO or 1-700.172.8435  [place] Crisis Hotline:    1-214.104.7887 or 445-686-6650         ED Discharge Follow Up Questions    1. In order to provide you with very good care, we would like to follow up with a phone call in the next few days.  May we have your permission to contact you?     YES /  NO    2. What is the best phone number to call you? (       )_____-__________    3. What is the best time to call you?      Morning  /  Afternoon  /  Evening                   Patient Signature:  ____________________________________________________________    Date:  ____________________________________________________________      Your appointments     May 31, 2017  9:00 AM   Individual Therapy with Blanca Brantley L.C.S.W.   [facility]    [address]  [address]  [place] [address]   [phone]            Jun 06, 2017  9:20 AM   Established Patient with Peter Martin M.D.   [facility] ([facility]    [address], [address]  [place] [address] [phone]           You will be receiving a confirmation call a few days before your appointment from our automated call confirmation system.            Jul 17, 2017 10:00 AM   Individual Therapy with Blanca Brantley L.C.S.W.   [facility]    [address]  [address]  [place] [address] [phone]            Jul 19, 2017  8:40 AM   FOLLOW UP with Torres Kumar M.D.   [facility] (--)    [address], [address]  [place] [address]   [phone]            Aug 11, 2017 10:00 AM   Follow Up Visit with Sandoval Fox M.D.   [facility] (--)    [address], [address]  [place] [address] [phone]           You will be receiving a confirmation call a few days before your appointment from our automated call confirmation system.            Aug 23, 2017  9:00 AM   Established Patient with Torres Brody M.D.   [facility] [address] ([facility])    [address]  [address]  [place] [address] [phone]           You will be receiving a confirmation call a few days before your appointment from our automated call confirmation system.

## 2017-05-27 ENCOUNTER — HOSPITAL ENCOUNTER (OUTPATIENT)
Dept: LAB | Facility: MEDICAL CENTER | Age: 27
End: 2017-05-27
Attending: INTERNAL MEDICINE
Payer: COMMERCIAL

## 2017-05-31 ENCOUNTER — APPOINTMENT (OUTPATIENT)
Dept: MEDICAL GROUP | Facility: MEDICAL CENTER | Age: 27
End: 2017-05-31
Payer: MEDICARE

## 2017-05-31 ENCOUNTER — APPOINTMENT (OUTPATIENT)
Dept: BEHAVIORAL HEALTH | Facility: PHYSICIAN GROUP | Age: 27
End: 2017-05-31
Payer: MEDICARE

## 2017-06-06 ENCOUNTER — APPOINTMENT (OUTPATIENT)
Dept: ONCOLOGY | Facility: MEDICAL CENTER | Age: 27
End: 2017-06-06
Attending: PSYCHIATRY & NEUROLOGY
Payer: MEDICARE

## 2017-06-09 ENCOUNTER — OFFICE VISIT (OUTPATIENT)
Dept: MEDICAL GROUP | Facility: MEDICAL CENTER | Age: 27
End: 2017-06-09
Payer: MEDICARE

## 2017-06-09 VITALS
BODY MASS INDEX: 42.43 KG/M2 | TEMPERATURE: 97.2 F | RESPIRATION RATE: 16 BRPM | WEIGHT: 255 LBS | OXYGEN SATURATION: 97 % | HEART RATE: 95 BPM | SYSTOLIC BLOOD PRESSURE: 118 MMHG | DIASTOLIC BLOOD PRESSURE: 78 MMHG

## 2017-06-09 DIAGNOSIS — I10 ESSENTIAL HYPERTENSION: Chronic | ICD-10-CM

## 2017-06-09 DIAGNOSIS — R21 RASH: ICD-10-CM

## 2017-06-09 PROCEDURE — 99213 OFFICE O/P EST LOW 20 MIN: CPT | Performed by: INTERNAL MEDICINE

## 2017-06-09 NOTE — PROGRESS NOTES
Subjective:     Chief Complaint   Patient presents with   • Rash     lower abd. area     Kristin Balderrama is a 26 y.o. female here today for evaluation of a rash.    HTN (hypertension)  Blood pressure today is satisfactory. She denies lightheadedness.    Rash  She reports that yesterday she noticed a rash on her abdomen in the region of the waistband of her depends. It itches and burns to some extent so she came in to have it evaluated. Incidentally she is being treated with Macrobid for a urinary infection. She also has diabetes mellitus but reports that is under fairly good control with blood sugars around .    Uncontrolled type 2 diabetes mellitus without complication, without long-term current use of insulin (CMS-HCC)  She reports that recently her diabetes has been under fairly good control with blood sugars around . She denies low blood sugar reactions.       Diagnoses of Rash, Uncontrolled type 2 diabetes mellitus without complication, without long-term current use of insulin (CMS-HCC), and Essential hypertension were pertinent to this visit.    Allergies: Cefdinir; Depakote; Abilify; Amitriptyline; Amoxicillin; Ciprofloxacin; Clindamycin; Doxycycline; Ees; Flagyl; Flomax; Metformin; Sulfa drugs; Tape; Vancomycin; Cephalexin; Erythromycin; Levofloxacin; Metronidazole; and Valproic acid  Current medicines (including changes today)  Current Outpatient Prescriptions   Medication Sig Dispense Refill   • nitrofurantoin monohydr macro (MACROBID) 100 MG Cap Take 1 Cap by mouth 2 times a day for 10 days. (Patient taking differently: Take 200 mg by mouth 2 times a day.) 20 Cap 0   • Mirabegron ER (MYRBETRIQ) 25 MG TABLET SR 24 HR Take 1 Tab by mouth every day.     • sitagliptin (JANUVIA) 50 MG Tab Take 1 Tab by mouth every day. 30 Tab 11   • oxycodone-acetaminophen (PERCOCET-10)  MG Tab Take 2 Tabs by mouth every 6 hours as needed for Severe Pain.     • promethazine (PHENERGAN) 25 MG Tab Take 1  Tab by mouth every 6 hours as needed for Nausea/Vomiting. 30 Tab 6   • aspirin EC (ECOTRIN) 81 MG Tablet Delayed Response Take 1 Tab by mouth every day. 30 Tab 6   • liraglutide (VICTOZA) 18 MG/3ML Solution Pen-injector injection Inject 0.3 mL as instructed every day. 3 PEN 11   • baclofen (LIORESAL) 10 MG Tab Take 1 Tab by mouth 3 times a day. 90 Tab 6   • CRANBERRY PO Take 100,400 mg by mouth 2 times a day.     • ivabradine (CORLANOR) 5 MG Tab tablet Take 1 Tab by mouth 2 times a day, with meals. 60 Tab 6   • fluoxetine (PROZAC) 10 MG Cap TAKE ONE CAPSULE BY MOUTH ONCE A DAY 30 Cap 2   • ranitidine (ZANTAC) 150 MG Tab Take 150 mg by mouth 2 times a day.     • Melatonin 5 MG Tab Take 10 mg by mouth every bedtime.     • ziprasidone (GEODON) 80 MG Cap Take 160 mg by mouth every evening. Takes this at 1700 daily     • fentanyl (DURAGESIC) 25 MCG/HR PATCH 72 HR Apply 1 Patch to skin as directed every 72 hours.     • lactulose 10 GM/15ML Solution Take 10 g by mouth 2 times a day as needed.     • vitamin D, Ergocalciferol, (DRISDOL) 24476 UNITS Cap capsule Take 50,000 Units by mouth every Friday.     • albuterol 108 (90 BASE) MCG/ACT Aero Soln inhalation aerosol Inhale 2 Puffs by mouth every 6 hours as needed for Shortness of Breath.     • cyanocobalamin (HM VITAMIN B12) 500 MCG tablet Take 1,000 mcg by mouth 2 times a day.     • sodium bicarbonate 325 MG Tab Take 2 Tabs by mouth 3 times a day.     • levothyroxine (SYNTHROID) 75 MCG Tab Take 75 mcg by mouth every morning.     • omeprazole (PRILOSEC) 20 MG delayed-release capsule Take 20 mg by mouth 2 times a day.       No current facility-administered medications for this visit.       She  has a past medical history of Scoliosis; Multiple personality disorder; Chronic UTI (9/18/2010); Heart burn; Pain (08-15-12); History of falling; Sinus tachycardia (10/31/2013); Urinary incontinence; Hypertension; Disorder of thyroid; Obesity; Pneumonia (2012); ASTHMA; Breath  shortness; Anginal syndrome; Psychosis; Arthritis; PCOS (polycystic ovarian syndrome); Gynecological disorder; Renal disorder; Arrhythmia; Urinary bladder disorder; Tuberculosis; Sleep apnea; Mitochondrial disease; and Fall.    ROS    Patient denies significant change in strength, weight or appetite.  No significant lightheadedness or headaches.  No change in vision, hearing, or swallowing.  No new dyspnea, coughing, chest pain, or palpitations.  No indigestion, abdominal pain, or change in bowel habits. She has noticed a rash on her abdomen in the region of the waistband of her underpants.  No change in urinating. She has an indwelling suprapubic catheter.  No new ankle swelling.       Objective:     PE:  /78 mmHg  Pulse 95  Temp(Src) 36.2 °C (97.2 °F)  Resp 16  Wt 115.667 kg (255 lb)  SpO2 97%  LMP 06/04/2016   Neck is supple without significant lymphadenopathy or masses.  Lungs are clear with normal breath sounds without wheezes or rales .  Cardiovascular: peripheral circulation is satisfactory, heart sounds are unchanged and unremarkable.  Abdomen is soft, without masses or tenderness, with normal bowel sounds. There is a reddish maculopapular coalescent rash in the region of the waistband of her Depends.  Extremities are without significant edema, cyanosis or deformity.      Assessment and Plan:   The following treatment plan was discussed  1. Rash      Keep the area cool and dry. Apply Lamisil cream twice a day. Report any lack of improvement or worsening.   2. Uncontrolled type 2 diabetes mellitus without complication, without long-term current use of insulin (CMS-[facility])      Continue same regimen for diabetes.  Diabetes seems under good control currently.    3. Essential hypertension      Continue same regimen.       Followup: She will keep her next scheduled appointment or contact us sooner especially if the rash does not resolve.

## 2017-06-09 NOTE — ASSESSMENT & PLAN NOTE
She reports that yesterday she noticed a rash on her abdomen in the region of the waistband of her depends. It itches and burns to some extent so she came in to have it evaluated. Incidentally she is being treated with Macrobid for a urinary infection. She also has diabetes mellitus but reports that is under fairly good control with blood sugars around .

## 2017-06-09 NOTE — MR AVS SNAPSHOT
"        Kristin Armstrong Balderrama   2017 11:40 AM   Office Visit   MRN: [id number]    Department:  [address] [place] [address]   Dept Phone:  [phone]    Description:  Female : 1990   Provider:  Bg Toro M.D.           Reason for Visit     Rash lower abd. area      Allergies as of 2017     Allergen Noted Reactions    Cefdinir 2016   Shortness of Breath, Itching    Tolerated 17    Depakote [Divalproex Sodium] 2010   Unspecified    Muscle spasms/muscle pain and weakness      Abilify 2013   Unspecified    Headaches/muscle twitching      Amitriptyline 10/31/2013   Unspecified    Headaches      Amoxicillin 2010   Rash    Pt states \"I get a rash\".      Ciprofloxacin 2009   Rash    Pt states \"I get a rash\".      Clindamycin 2011   Nausea    Even with food      Doxycycline 08/15/2012   Vomiting, Nausea    RXN=unknown    Ees [Erythromycin] 2010   Vomiting, Nausea    Flagyl [Metronidazole Hcl] 2011   Unspecified    \"eye problems\"      Flomax [Tamsulosin Hydrochloride] 2009   Swelling    Metformin 2013   Unspecified    Increased lactic acid       Sulfa Drugs 2010   Hives, Rash    RXN=since childhood    Tape 08/15/2012   Rash    Tears skin off   coban with Tegaderm tape ok  RXN=ongoing    Vancomycin 07/10/2016   Itching    Pt becomes flushed in face and chest.   RXN=7/10/16    Cephalexin [Keflex] 2017   Rash    Pt states she gets a rash with this medication    Erythromycin 2017       Levofloxacin 10/27/2016   Unspecified    Leg muscle cramps    Metronidazole 2017       Valproic Acid 2017         Vital Signs     Blood Pressure Pulse Temperature Respirations Weight Oxygen Saturation    118/78 mmHg 95 36.2 °C (97.2 °F) 16 115.667 kg (255 lb) 97%    Last Menstrual Period Smoking Status                2016 Passive Smoke Exposure - Never Smoker          Basic Information     Date Of Birth Sex Race Ethnicity Preferred " Language    1990 Female White Non- English      Your appointments     Jun 09, 2017 11:40 AM   Established Patient with Bg Toro M.D.   [facility] [address] ([facility])    [address] [facility] [address]  [facility] [id number] [phone]           You will be receiving a confirmation call a few days before your appointment from our automated call confirmation system.            Jul 14, 2017 10:00 AM   Follow Up Med Management with Ronny Burnette M.D.   [facility] [address] [place] [address] ([facility])    [address] [facility]  [address]  [place] [address]   [phone]            Jul 17, 2017 10:00 AM   Individual Therapy with Blacna Brantley L.C.S.W.   [facility] [place]    [address] [facility]  [address]  [place] [address] [phone]            Jul 19, 2017  8:40 AM   FOLLOW UP with Torres Kumar M.D.   [facility] (--)    [address] [facility] [address]  [place] [address]   [phone]            Aug 11, 2017 10:00 AM   Follow Up Visit with Sandoval Fox M.D.   [facility] (--)    [address]  [place] [address] [phone]           You will be receiving a confirmation call a few days before your appointment from our automated call confirmation system.            Aug 23, 2017  9:00 AM   Established Patient with Torres Brody M.D.   [facility] [address] ([facility])    [address] [facility] [address]  [place] [address] [phone]           You will be receiving a confirmation call a few days before your appointment from our automated call confirmation system.              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 Low  9/18/2010 - Present    Neurogenic bladder N31.9 Low  4/2/2011 - Present    Sinus tachycardia (Chronic) R00.0 High  10/31/2013 - Present    Knee pain, right M25.561 Low   2/13/2014 - Present    Anxiety F41.9 Low  12/16/2014 - Present    Fatty liver disease, nonalcoholic K76.0 Low  1/19/2015 - Present    Progressive focal motor weakness R53.1 Low  6/28/2015 - Present    Acquired hypothyroidism E03.9 Low  11/23/2015 - Present    PCOS (polycystic ovarian syndrome) E28.2 Low  11/23/2015 - Present    H/O prior ablation treatment Z98.890   2/10/2016 - Present    Peripheral neuropathy (CMS-HCC) (Chronic) G62.9   3/6/2016 - Present    TONYA on CPAP G47.33, Z99.89 Low  3/7/2016 - Present    Morbidly obese (CMS-HCC) E66.01   3/7/2016 - Present    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   3/7/2016 - Present    Scoliosis M41.9   3/7/2016 - Present    GERD (gastroesophageal reflux disease) (Chronic) K21.9   3/7/2016 - Present    Vitamin D deficiency E55.9   5/21/2016 - Present    Chronic inflammatory arthritis (Chronic) M19.90 Medium  5/23/2016 - Present    Weakness of both lower extremities R29.898   6/22/2016 - Present    Elevated sedimentation rate R70.0   6/27/2016 - Present    Galactorrhea O92.6   7/22/2016 - Present    Psychosis, schizophrenia, simple (HCC) (Chronic) F20.89 Low Chronic 9/29/2016 - Present    Schizophrenia (CMS-HCC) F20.9 Low  10/27/2016 - Present    Paralysis (CMS-HCC) G83.9 High  10/27/2016 - Present    Chronic pain syndrome (Chronic) G89.4   10/27/2016 - Present    Bowel and bladder incontinence R32, R15.9   10/27/2016 - Present    Depression F32.9 Low  10/28/2016 - Present    HTN (hypertension) (Chronic) I10   11/1/2016 - Present    Hypovitaminosis D E55.9   11/29/2016 - Present    Leg weakness R29.898   1/4/2017 - Present    Weakness R53.1   1/22/2017 - Present    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 High  1/24/2017 - Present    Chronic suprapubic catheter (CMS-HCC) (Chronic) Z93.59   2/16/2017 - Present    Leg weakness, bilateral R29.898   2/22/2017 - Present    Weakness of right upper extremity R29.898   2/23/2017 - Present    Chest pain R07.9   3/30/2017 -  Present    On home oxygen therapy (Chronic) Z99.81   4/15/2017 - Present    Uncontrolled type 2 diabetes mellitus without complication, without long-term current use of insulin (CMS-HCC) E11.65   4/26/2017 - Present    Dysuria R30.0   5/9/2017 - Present    Hashimoto's encephalopathy E06.3, G93.49   5/17/2017 - Present      Health Maintenance        Date Due Completion Dates    IMM HEP A VACCINE (1 of 2 - Standard Series) 10/13/1990 ---    IMM VARICELLA (CHICKENPOX) VACCINE (1 of 2 - 2 Dose Adolescent Series) 10/13/2002 ---    URINE ACR / MICROALBUMIN 10/13/2007 ---    A1C SCREENING 10/6/2017 4/6/2017, 12/7/2016, 10/14/2016, 3/9/2016, 9/5/2014    FASTING LIPID PROFILE 3/7/2018 3/7/2017, 2/24/2017, 12/7/2016, 10/28/2016, 10/14/2016, 3/21/2014    SERUM CREATININE 5/18/2018 5/18/2017, 5/17/2017, 5/5/2017, 4/14/2017, 4/13/2017, 4/12/2017, 4/5/2017, 3/27/2017, 3/18/2017, 3/17/2017, 3/16/2017, 3/11/2017, 3/10/2017, 3/9/2017, 3/7/2017, 2/25/2017, 2/24/2017, 2/23/2017, 2/22/2017, 1/30/2017, 1/27/2017, 1/25/2017, 1/22/2017, 1/22/2017, 1/18/2017, 1/18/2017, 12/7/2016, 12/6/2016, 11/4/2016, 11/3/2016, 11/2/2016, 11/1/2016, 10/28/2016, 10/27/2016, 10/14/2016, 10/4/2016, 10/3/2016, 9/29/2016, 8/16/2016, 7/28/2016, 7/28/2016, 7/10/2016, 6/25/2016, 6/23/2016, 6/22/2016, 6/7/2016, 5/18/2016, 4/19/2016, 4/3/2016, 3/30/2016, 3/29/2016, 3/28/2016, 3/14/2016, 3/10/2016, 3/9/2016, 3/7/2016, 3/6/2016, 2/15/2016, 2/12/2016, 1/29/2016, 1/28/2016, 1/26/2016, 11/28/2015, 11/27/2015, 11/23/2015, 11/22/2015, 7/9/2015, 7/8/2015, 6/27/2015, 4/14/2015, 3/23/2015, 2/18/2015, 10/27/2014, 9/5/2014, 3/21/2014, 12/24/2013, 1/12/2013, 8/24/2012, 8/23/2012, 8/22/2012, 8/15/2012, 4/21/2012, 4/20/2012, 4/19/2012, 9/17/2011, 4/3/2011, 4/2/2011, 3/31/2011, 9/20/2010, 9/19/2010, 9/18/2010, 8/28/2010, 7/20/2010, 1/30/2007    RETINAL SCREENING 5/23/2018 5/23/2017    DIABETES MONOFILAMENT / LE EXAM 5/23/2018 5/23/2017    PAP SMEAR 7/22/2019 7/22/2016  "(Postponed), 2/19/2013 (N/S)    Override on 7/22/2016: Postponed (per pt was told could \"skip\" 2016)    Override on 2/19/2013: (N/S) ([place])    IMM DTaP/Tdap/Td Vaccine (7 - Td) 8/6/2022 8/6/2012, 9/18/2010, 3/3/1994, 5/17/1991, 5/10/1990, 3/23/1990, 1990    COLONOSCOPY 9/25/2023 9/25/2013            Current Immunizations     Dtap Vaccine 3/3/1994, 5/17/1991, 5/10/1990, 3/23/1990, 1990    HIB Vaccine(PEDVAX) 1/11/1991    HPV Quadrivalent Vaccine (GARDASIL) 10/27/2011, 5/27/2011, 3/1/2011    Hepatitis B Vaccine Non-Recombivax (Ped/Adol) 1/28/2004, 10/28/2003, 10/29/1999    Influenza TIV (IM) 9/5/2013, 12/7/2011, 11/7/2011    Influenza Vaccine Adult HD 10/5/2016    MMR Vaccine 3/3/1994, 1/11/1991    OPV - Historical Data 3/3/1994, 5/17/1991, 5/10/1990, 3/23/1990, 1990    Pneumococcal Vaccine (PCV7) Historical Data 11/8/2015    Pneumococcal polysaccharide vaccine (PPSV-23) 1/23/2017  5:35 PM    TD Vaccine 9/18/2010  4:45 PM    Tdap Vaccine 8/6/2012      Below and/or attached are the medications your provider expects you to take. Review all of your home medications and newly ordered medications with your provider and/or pharmacist. Follow medication instructions as directed by your provider and/or pharmacist. Please keep your medication list with you and share with your provider. Update the information when medications are discontinued, doses are changed, or new medications (including over-the-counter products) are added; and carry medication information at all times in the event of emergency situations     Allergies:  CEFDINIR - Shortness of Breath,Itching     DEPAKOTE - Unspecified     ABILIFY - Unspecified     AMITRIPTYLINE - Unspecified     AMOXICILLIN - Rash     CIPROFLOXACIN - Rash     CLINDAMYCIN - Nausea     DOXYCYCLINE - Vomiting,Nausea     EES - Vomiting,Nausea     FLAGYL - Unspecified     FLOMAX - Swelling     METFORMIN - Unspecified     SULFA DRUGS - Hives,Rash     TAPE - Rash     " VANCOMYCIN - Itching     CEPHALEXIN - Rash     ERYTHROMYCIN - (reactions not documented)     LEVOFLOXACIN - Unspecified     METRONIDAZOLE - (reactions not documented)     VALPROIC ACID - (reactions not documented)               Medications  Valid as of: June 09, 2017 - 11:15 AM    Generic Name Brand Name Tablet Size Instructions for use    Albuterol Sulfate (Aero Soln) albuterol 108 (90 BASE) MCG/ACT Inhale 2 Puffs by mouth every 6 hours as needed for Shortness of Breath.        Aspirin (Tablet Delayed Response) ECOTRIN 81 MG Take 1 Tab by mouth every day.        Baclofen (Tab) LIORESAL 10 MG Take 1 Tab by mouth 3 times a day.        Cranberry   Take 100,400 mg by mouth 2 times a day.        Cyanocobalamin (Tab) vitamin b12 500 MCG Take 1,000 mcg by mouth 2 times a day.        Ergocalciferol (Cap) DRISDOL 20212 UNITS Take 50,000 Units by mouth every Friday.        FentaNYL (PATCH 72 HR) DURAGESIC 25 MCG/HR Apply 1 Patch to skin as directed every 72 hours.        FLUoxetine HCl (Cap) PROZAC 10 MG TAKE ONE CAPSULE BY MOUTH ONCE A DAY        Ivabradine HCl (Tab) CORLANOR 5 MG Take 1 Tab by mouth 2 times a day, with meals.        Lactulose (Solution) lactulose 10 GM/15ML Take 10 g by mouth 2 times a day as needed.        Levothyroxine Sodium (Tab) SYNTHROID 75 MCG Take 75 mcg by mouth every morning.        Liraglutide (Solution Pen-injector) VICTOZA 18 MG/3ML Inject 0.3 mL as instructed every day.        Melatonin (Tab) Melatonin 5 MG Take 10 mg by mouth every bedtime.        Mirabegron (TABLET SR 24 HR) Mirabegron ER 25 MG Take 1 Tab by mouth every day.        Nitrofurantoin Monohyd Macro (Cap) MACROBID 100 MG Take 1 Cap by mouth 2 times a day for 10 days.        Omeprazole (CAPSULE DELAYED RELEASE) PRILOSEC 20 MG Take 20 mg by mouth 2 times a day.        Oxycodone-Acetaminophen (Tab) PERCOCET-10  MG Take 2 Tabs by mouth every 6 hours as needed for Severe Pain.        Promethazine HCl (Tab) PHENERGAN 25 MG Take 1  Tab by mouth every 6 hours as needed for Nausea/Vomiting.        RaNITidine HCl (Tab) ZANTAC 150 MG Take 150 mg by mouth 2 times a day.        SITagliptin Phosphate (Tab) JANUVIA 50 MG Take 1 Tab by mouth every day.        Sodium Bicarbonate (Tab) sodium bicarbonate 325 MG Take 2 Tabs by mouth 3 times a day.        Ziprasidone HCl (Cap) GEODON 80 MG Take 160 mg by mouth every evening. Takes this at 1700 daily        .                 Medicines prescribed today were sent to:     [id number] - [place], [place] - [address] [place] [address]    Phone: [phone] Fax: [phone]    Open 24 Hours?: No      Medication refill instructions:       If your prescription bottle indicates you have medication refills left, it is not necessary to call your provider’s office. Please contact your pharmacy and they will refill your medication.    If your prescription bottle indicates you do not have any refills left, you may request refills at any time through one of the following ways: The online [employer] system (except Urgent Care), by calling your provider’s office, or by asking your pharmacy to contact your provider’s office with a refill request. Medication refills are processed only during regular business hours and may not be available until the next business day. Your provider may request additional information or to have a follow-up visit with you prior to refilling your medication.   *Please Note: Medication refills are assigned a new Rx number when refilled electronically. Your pharmacy may indicate that no refills were authorized even though a new prescription for the same medication is available at the pharmacy. Please request the medicine by name with the pharmacy before contacting your provider for a refill.        Other Notes About Your Plan     DME:  [facility] / ph [phone] / fax [phone]              [employer] Access Code: Activation code not generated  Current [employer]  Status: Active

## 2017-06-09 NOTE — ASSESSMENT & PLAN NOTE
She reports that recently her diabetes has been under fairly good control with blood sugars around . She denies low blood sugar reactions.

## 2017-06-12 ENCOUNTER — OFFICE VISIT (OUTPATIENT)
Dept: MEDICAL GROUP | Facility: MEDICAL CENTER | Age: 27
End: 2017-06-12
Payer: MEDICARE

## 2017-06-12 VITALS
TEMPERATURE: 97.9 F | HEART RATE: 92 BPM | BODY MASS INDEX: 42.92 KG/M2 | RESPIRATION RATE: 16 BRPM | OXYGEN SATURATION: 98 % | HEIGHT: 65 IN | SYSTOLIC BLOOD PRESSURE: 116 MMHG | DIASTOLIC BLOOD PRESSURE: 82 MMHG | WEIGHT: 257.6 LBS

## 2017-06-12 DIAGNOSIS — B37.9 YEAST INFECTION: ICD-10-CM

## 2017-06-12 DIAGNOSIS — I10 ESSENTIAL HYPERTENSION: Chronic | ICD-10-CM

## 2017-06-12 PROCEDURE — 99214 OFFICE O/P EST MOD 30 MIN: CPT | Performed by: INTERNAL MEDICINE

## 2017-06-12 RX ORDER — FLUCONAZOLE 150 MG/1
150 TABLET ORAL DAILY
Qty: 2 TAB | Refills: 0 | Status: SHIPPED | OUTPATIENT
Start: 2017-06-12 | End: 2017-07-06

## 2017-06-12 NOTE — MR AVS SNAPSHOT
"        Kristin Balderrama   2017 10:20 AM   Office Visit   MRN: [id number]    Department:  [address] [place] [address]   Dept Phone:  [phone]    Description:  Female : 1990   Provider:  Torres Brody M.D.           Allergies as of 2017     Allergen Noted Reactions    Cefdinir 2016   Shortness of Breath, Itching    Tolerated 17    Depakote [Divalproex Sodium] 2010   Unspecified    Muscle spasms/muscle pain and weakness      Abilify 2013   Unspecified    Headaches/muscle twitching      Amitriptyline 10/31/2013   Unspecified    Headaches      Amoxicillin 2010   Rash    Pt states \"I get a rash\".      Ciprofloxacin 2009   Rash    Pt states \"I get a rash\".      Clindamycin 2011   Nausea    Even with food      Doxycycline 08/15/2012   Vomiting, Nausea    RXN=unknown    Ees [Erythromycin] 2010   Vomiting, Nausea    Flagyl [Metronidazole Hcl] 2011   Unspecified    \"eye problems\"      Flomax [Tamsulosin Hydrochloride] 2009   Swelling    Metformin 2013   Unspecified    Increased lactic acid       Sulfa Drugs 2010   Hives, Rash    RXN=since childhood    Tape 08/15/2012   Rash    Tears skin off   coban with Tegaderm tape ok  RXN=ongoing    Vancomycin 07/10/2016   Itching    Pt becomes flushed in face and chest.   RXN=7/10/16    Cephalexin [Keflex] 2017   Rash    Pt states she gets a rash with this medication    Erythromycin 2017       Levofloxacin 10/27/2016   Unspecified    Leg muscle cramps    Metronidazole 2017       Valproic Acid 2017         You were diagnosed with     Uncontrolled type 2 diabetes mellitus without complication, without long-term current use of insulin (CMS-[facility])   [8258680]       Yeast infection   [135645]       Essential hypertension   [0525595]         Vital Signs     Blood Pressure Pulse Temperature Respirations Height Weight    116/82 mmHg 92 36.6 °C (97.9 °F) 16 1.651 m (5' 5\") 116.847 kg " (257 lb 9.6 oz)    Body Mass Index Oxygen Saturation Last Menstrual Period Smoking Status          42.87 kg/m2 98% 06/04/2016 Passive Smoke Exposure - Never Smoker        Basic Information     Date Of Birth Sex Race Ethnicity Preferred Language    1990 Female White Non- English      Your appointments     Jul 14, 2017 10:00 AM   Follow Up Med Management with Ronny Burnette M.D.   BE[facility]AVIORAL HE[facility] [address] [place] [address] ([facility])    [address] [facility] [address]  [address]  [place] [address]   [phone]            Jul 17, 2017 10:00 AM   Individual Therapy with Blanca Brantley L.C.S.W.   [place] [place]    [address]  [address]  [place] [address]   [phone]            Jul 19, 2017  8:40 AM   FOLLOW UP with Torres Kumar M.D.   [facility] (--)    [address], [address]  [place] [address]   [phone]            Aug 11, 2017 10:00 AM   Follow Up Visit with Sandoval Fox M.D.   [facility] (--)    [address], [address]  [place] [id number] [phone]           You will be receiving a confirmation call a few days before your appointment from our automated call confirmation system.            Aug 23, 2017  9:00 AM   Established Patient with Torres Brody M.D.   [facility] [address]    [address]  [address]  [place] [address] [phone]           You will be receiving a confirmation call a few days before your appointment from our automated call confirmation system.              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 Low  9/18/2010 - Present    Neurogenic bladder N31.9 Low  4/2/2011 - Present    Sinus tachycardia (Chronic) R00.0 High  10/31/2013 - Present    Knee pain, right M25.561 Low  2/13/2014 - Present    Anxiety F41.9 Low  12/16/2014 - Present    Fatty liver disease,  nonalcoholic K76.0 Low  1/19/2015 - Present    Progressive focal motor weakness R53.1 Low  6/28/2015 - Present    Acquired hypothyroidism E03.9 Low  11/23/2015 - Present    PCOS (polycystic ovarian syndrome) E28.2 Low  11/23/2015 - Present    H/O prior ablation treatment Z98.890   2/10/2016 - Present    Peripheral neuropathy (CMS-HCC) (Chronic) G62.9   3/6/2016 - Present    TONYA on CPAP G47.33, Z99.89 Low  3/7/2016 - Present    Morbidly obese (CMS-HCC) E66.01   3/7/2016 - Present    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   3/7/2016 - Present    Scoliosis M41.9   3/7/2016 - Present    GERD (gastroesophageal reflux disease) (Chronic) K21.9   3/7/2016 - Present    Vitamin D deficiency E55.9   5/21/2016 - Present    Chronic inflammatory arthritis (Chronic) M19.90 Medium  5/23/2016 - Present    Weakness of both lower extremities R29.898   6/22/2016 - Present    Elevated sedimentation rate R70.0   6/27/2016 - Present    Galactorrhea O92.6   7/22/2016 - Present    Psychosis, schizophrenia, simple (HCC) (Chronic) F20.89 Low Chronic 9/29/2016 - Present    Schizophrenia (CMS-HCC) F20.9 Low  10/27/2016 - Present    Paralysis (CMS-HCC) G83.9 High  10/27/2016 - Present    Chronic pain syndrome (Chronic) G89.4   10/27/2016 - Present    Bowel and bladder incontinence R32, R15.9   10/27/2016 - Present    Depression F32.9 Low  10/28/2016 - Present    HTN (hypertension) (Chronic) I10   11/1/2016 - Present    Hypovitaminosis D E55.9   11/29/2016 - Present    Leg weakness R29.898   1/4/2017 - Present    Weakness R53.1   1/22/2017 - Present    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 High  1/24/2017 - Present    Chronic suprapubic catheter (CMS-HCC) (Chronic) Z93.59   2/16/2017 - Present    Leg weakness, bilateral R29.898   2/22/2017 - Present    Weakness of right upper extremity R29.898   2/23/2017 - Present    Chest pain R07.9   3/30/2017 - Present    On home oxygen therapy (Chronic) Z99.81   4/15/2017 - Present    Uncontrolled  type 2 diabetes mellitus without complication, without long-term current use of insulin (CMS-[facility]) E11.65   4/26/2017 - Present    Dysuria R30.0   5/9/2017 - Present    Hashimoto's encephalopathy E06.3, G93.49   5/17/2017 - Present    Rash R21   6/9/2017 - Present      Health Maintenance        Date Due Completion Dates    IMM HEP A VACCINE (1 of 2 - Standard Series) 10/13/1990 ---    IMM VARICELLA (CHICKENPOX) VACCINE (1 of 2 - 2 Dose Adolescent Series) 10/13/2002 ---    URINE ACR / MICROALBUMIN 10/13/2007 ---    A1C SCREENING 10/6/2017 4/6/2017, 12/7/2016, 10/14/2016, 3/9/2016, 9/5/2014    FASTING LIPID PROFILE 3/7/2018 3/7/2017, 2/24/2017, 12/7/2016, 10/28/2016, 10/14/2016, 3/21/2014    SERUM CREATININE 5/18/2018 5/18/2017, 5/17/2017, 5/5/2017, 4/14/2017, 4/13/2017, 4/12/2017, 4/5/2017, 3/27/2017, 3/18/2017, 3/17/2017, 3/16/2017, 3/11/2017, 3/10/2017, 3/9/2017, 3/7/2017, 2/25/2017, 2/24/2017, 2/23/2017, 2/22/2017, 1/30/2017, 1/27/2017, 1/25/2017, 1/22/2017, 1/22/2017, 1/18/2017, 1/18/2017, 12/7/2016, 12/6/2016, 11/4/2016, 11/3/2016, 11/2/2016, 11/1/2016, 10/28/2016, 10/27/2016, 10/14/2016, 10/4/2016, 10/3/2016, 9/29/2016, 8/16/2016, 7/28/2016, 7/28/2016, 7/10/2016, 6/25/2016, 6/23/2016, 6/22/2016, 6/7/2016, 5/18/2016, 4/19/2016, 4/3/2016, 3/30/2016, 3/29/2016, 3/28/2016, 3/14/2016, 3/10/2016, 3/9/2016, 3/7/2016, 3/6/2016, 2/15/2016, 2/12/2016, 1/29/2016, 1/28/2016, 1/26/2016, 11/28/2015, 11/27/2015, 11/23/2015, 11/22/2015, 7/9/2015, 7/8/2015, 6/27/2015, 4/14/2015, 3/23/2015, 2/18/2015, 10/27/2014, 9/5/2014, 3/21/2014, 12/24/2013, 1/12/2013, 8/24/2012, 8/23/2012, 8/22/2012, 8/15/2012, 4/21/2012, 4/20/2012, 4/19/2012, 9/17/2011, 4/3/2011, 4/2/2011, 3/31/2011, 9/20/2010, 9/19/2010, 9/18/2010, 8/28/2010, 7/20/2010, 1/30/2007    RETINAL SCREENING 5/23/2018 5/23/2017    DIABETES MONOFILAMENT / LE EXAM 5/23/2018 5/23/2017    PAP SMEAR 7/22/2019 7/22/2016 (Postponed), 2/19/2013 (N/S)    Override on 7/22/2016: Postponed  "(per pt was told could \"skip\" 2016)    Override on 2/19/2013: (N/S) ([facility])    IMM DTaP/Tdap/Td Vaccine (7 - Td) 8/6/2022 8/6/2012, 9/18/2010, 3/3/1994, 5/17/1991, 5/10/1990, 3/23/1990, 1990    COLONOSCOPY 9/25/2023 9/25/2013            Current Immunizations     Dtap Vaccine 3/3/1994, 5/17/1991, 5/10/1990, 3/23/1990, 1990    HIB Vaccine(PEDVAX) 1/11/1991    HPV Quadrivalent Vaccine (GARDASIL) 10/27/2011, 5/27/2011, 3/1/2011    Hepatitis B Vaccine Non-Recombivax (Ped/Adol) 1/28/2004, 10/28/2003, 10/29/1999    Influenza TIV (IM) 9/5/2013, 12/7/2011, 11/7/2011    Influenza Vaccine Adult HD 10/5/2016    MMR Vaccine 3/3/1994, 1/11/1991    OPV - Historical Data 3/3/1994, 5/17/1991, 5/10/1990, 3/23/1990, 1990    Pneumococcal Vaccine (PCV7) Historical Data 11/8/2015    Pneumococcal polysaccharide vaccine (PPSV-23) 1/23/2017  5:35 PM    TD Vaccine 9/18/2010  4:45 PM    Tdap Vaccine 8/6/2012      Below and/or attached are the medications your provider expects you to take. Review all of your home medications and newly ordered medications with your provider and/or pharmacist. Follow medication instructions as directed by your provider and/or pharmacist. Please keep your medication list with you and share with your provider. Update the information when medications are discontinued, doses are changed, or new medications (including over-the-counter products) are added; and carry medication information at all times in the event of emergency situations     Allergies:  CEFDINIR - Shortness of Breath,Itching     DEPAKOTE - Unspecified     ABILIFY - Unspecified     AMITRIPTYLINE - Unspecified     AMOXICILLIN - Rash     CIPROFLOXACIN - Rash     CLINDAMYCIN - Nausea     DOXYCYCLINE - Vomiting,Nausea     EES - Vomiting,Nausea     FLAGYL - Unspecified     FLOMAX - Swelling     METFORMIN - Unspecified     SULFA DRUGS - Hives,Rash     TAPE - Rash     VANCOMYCIN - Itching     CEPHALEXIN - Rash     ERYTHROMYCIN - (reactions " not documented)     LEVOFLOXACIN - Unspecified     METRONIDAZOLE - (reactions not documented)     VALPROIC ACID - (reactions not documented)               Medications  Valid as of: June 12, 2017 - 10:45 AM    Generic Name Brand Name Tablet Size Instructions for use    Albuterol Sulfate (Aero Soln) albuterol 108 (90 BASE) MCG/ACT Inhale 2 Puffs by mouth every 6 hours as needed for Shortness of Breath.        Aspirin (Tablet Delayed Response) ECOTRIN 81 MG Take 1 Tab by mouth every day.        Baclofen (Tab) LIORESAL 10 MG Take 1 Tab by mouth 3 times a day.        Cranberry   Take 100,400 mg by mouth 2 times a day.        Cyanocobalamin (Tab) vitamin b12 500 MCG Take 1,000 mcg by mouth 2 times a day.        Ergocalciferol (Cap) DRISDOL 92932 UNITS Take 50,000 Units by mouth every Friday.        FentaNYL (PATCH 72 HR) DURAGESIC 25 MCG/HR Apply 1 Patch to skin as directed every 72 hours.        Fluconazole (Tab) DIFLUCAN 150 MG Take 1 Tab by mouth every day. Take on day day one and day 3.        FLUoxetine HCl (Cap) PROZAC 10 MG TAKE ONE CAPSULE BY MOUTH ONCE A DAY        Ivabradine HCl (Tab) CORLANOR 5 MG Take 1 Tab by mouth 2 times a day, with meals.        Lactulose (Solution) lactulose 10 GM/15ML Take 10 g by mouth 2 times a day as needed.        Levothyroxine Sodium (Tab) SYNTHROID 75 MCG Take 75 mcg by mouth every morning.        Liraglutide (Solution Pen-injector) VICTOZA 18 MG/3ML Inject 0.3 mL as instructed every day.        Melatonin (Tab) Melatonin 5 MG Take 10 mg by mouth every bedtime.        Mirabegron (TABLET SR 24 HR) Mirabegron ER 25 MG Take 1 Tab by mouth every day.        Nitrofurantoin Monohyd Macro (Cap) MACROBID 100 MG Take 1 Cap by mouth 2 times a day for 10 days.        Omeprazole (CAPSULE DELAYED RELEASE) PRILOSEC 20 MG Take 20 mg by mouth 2 times a day.        Oxycodone-Acetaminophen (Tab) PERCOCET-10  MG Take 2 Tabs by mouth every 6 hours as needed for Severe Pain.        Promethazine  HCl (Tab) PHENERGAN 25 MG Take 1 Tab by mouth every 6 hours as needed for Nausea/Vomiting.        RaNITidine HCl (Tab) ZANTAC 150 MG Take 150 mg by mouth 2 times a day.        SITagliptin Phosphate (Tab) JANUVIA 100 MG Take 1 Tab by mouth every day.        Sodium Bicarbonate (Tab) sodium bicarbonate 325 MG Take 2 Tabs by mouth 3 times a day.        Ziprasidone HCl (Cap) GEODON 80 MG Take 160 mg by mouth every evening. Takes this at 1700 daily        .                 Medicines prescribed today were sent to:     [id number] - [place], [place] - [address]    [address] [place] [address]    Phone: [phone] Fax: [phone]    Open 24 Hours?: No      Medication refill instructions:       If your prescription bottle indicates you have medication refills left, it is not necessary to call your provider’s office. Please contact your pharmacy and they will refill your medication.    If your prescription bottle indicates you do not have any refills left, you may request refills at any time through one of the following ways: The online [employer] system (except Urgent Care), by calling your provider’s office, or by asking your pharmacy to contact your provider’s office with a refill request. Medication refills are processed only during regular business hours and may not be available until the next business day. Your provider may request additional information or to have a follow-up visit with you prior to refilling your medication.   *Please Note: Medication refills are assigned a new Rx number when refilled electronically. Your pharmacy may indicate that no refills were authorized even though a new prescription for the same medication is available at the pharmacy. Please request the medicine by name with the pharmacy before contacting your provider for a refill.        Other Notes About Your Plan     DME:  [facility] / ph [phone] / fax [phone]              [employer] Access Code: Activation code  not generated  Current MyChart Status: Active

## 2017-06-12 NOTE — PROGRESS NOTES
CC: Follow-up multiple issues    HPI:   Kristin presents today with the following.    1. Uncontrolled type 2 diabetes mellitus without complication, without long-term current use of insulin (CMS-HCC)  Presents reporting her blood sugars are persistently in the 200s prior to meals. She is compliant with her medications. She denies any hypoglycemia. Checking last creatinine was good at 0.9.    2. Yeast infection  She was treated for a rash on her abdomen is also being treated for UTI. She is reporting thick discharge as well as vaginal itching. Has no fevers or chills.    3. Essential hypertension  Blood pressure well controlled denying any chest pain or shortness of breath no progressive edema.      Patient Active Problem List    Diagnosis Date Noted   • Paraparesis of both lower limbs (CMS-HCC) 01/24/2017     Priority: High   • Paralysis (CMS-HCC) 10/27/2016     Priority: High   • Sinus tachycardia 10/31/2013     Priority: High   • Chronic inflammatory arthritis 05/23/2016     Priority: Medium   • Depression 10/28/2016     Priority: Low   • Schizophrenia (CMS-HCC) 10/27/2016     Priority: Low   • Psychosis, schizophrenia, simple ([facility]) 09/29/2016     Priority: Low     Class: Chronic   • TONYA on CPAP 03/07/2016     Priority: Low   • Acquired hypothyroidism 11/23/2015     Priority: Low   • PCOS (polycystic ovarian syndrome) 11/23/2015     Priority: Low   • Progressive focal motor weakness 06/28/2015     Priority: Low   • Fatty liver disease, nonalcoholic 01/19/2015     Priority: Low   • Anxiety 12/16/2014     Priority: Low   • Knee pain, right 02/13/2014     Priority: Low   • Neurogenic bladder 04/02/2011     Priority: Low   • Chronic UTI 09/18/2010     Priority: Low   • Multiple personality disorder 09/18/2010     Priority: Low   • Rash 06/09/2017   • Hashimoto's encephalopathy 05/17/2017   • Dysuria 05/09/2017   • Uncontrolled type 2 diabetes mellitus without complication, without long-term current use of insulin  (CMS-HCC) 04/26/2017   • On home oxygen therapy 04/15/2017   • Chest pain 03/30/2017   • Weakness of right upper extremity 02/23/2017   • Leg weakness, bilateral 02/22/2017   • Chronic suprapubic catheter (CMS-HCC) 02/16/2017   • Weakness 01/22/2017   • Leg weakness 01/04/2017   • Hypovitaminosis D 11/29/2016   • HTN (hypertension) 11/01/2016   • Chronic pain syndrome 10/27/2016   • Bowel and bladder incontinence 10/27/2016   • Galactorrhea 07/22/2016   • Elevated sedimentation rate 06/27/2016   • Weakness of both lower extremities 06/22/2016   • Vitamin D deficiency 05/21/2016   • Morbidly obese (CMS-HCC) 03/07/2016   • Conversion disorder 03/07/2016   • Scoliosis 03/07/2016   • GERD (gastroesophageal reflux disease) 03/07/2016   • Peripheral neuropathy (CMS-HCC) 03/06/2016   • H/O prior ablation treatment 02/10/2016       Current Outpatient Prescriptions   Medication Sig Dispense Refill   • fluconazole (DIFLUCAN) 150 MG tablet Take 1 Tab by mouth every day. Take on day day one and day 3. 2 Tab 0   • sitagliptin (JANUVIA) 100 MG Tab Take 1 Tab by mouth every day. 30 Tab 6   • nitrofurantoin monohydr macro (MACROBID) 100 MG Cap Take 1 Cap by mouth 2 times a day for 10 days. (Patient taking differently: Take 200 mg by mouth 2 times a day.) 20 Cap 0   • Mirabegron ER (MYRBETRIQ) 25 MG TABLET SR 24 HR Take 1 Tab by mouth every day.     • oxycodone-acetaminophen (PERCOCET-10)  MG Tab Take 2 Tabs by mouth every 6 hours as needed for Severe Pain.     • promethazine (PHENERGAN) 25 MG Tab Take 1 Tab by mouth every 6 hours as needed for Nausea/Vomiting. 30 Tab 6   • aspirin EC (ECOTRIN) 81 MG Tablet Delayed Response Take 1 Tab by mouth every day. 30 Tab 6   • liraglutide (VICTOZA) 18 MG/3ML Solution Pen-injector injection Inject 0.3 mL as instructed every day. 3 PEN 11   • baclofen (LIORESAL) 10 MG Tab Take 1 Tab by mouth 3 times a day. 90 Tab 6   • CRANBERRY PO Take 100,400 mg by mouth 2 times a day.     • ivabradine  (CORLANOR) 5 MG Tab tablet Take 1 Tab by mouth 2 times a day, with meals. 60 Tab 6   • fluoxetine (PROZAC) 10 MG Cap TAKE ONE CAPSULE BY MOUTH ONCE A DAY 30 Cap 2   • ranitidine (ZANTAC) 150 MG Tab Take 150 mg by mouth 2 times a day.     • Melatonin 5 MG Tab Take 10 mg by mouth every bedtime.     • ziprasidone (GEODON) 80 MG Cap Take 160 mg by mouth every evening. Takes this at 1700 daily     • fentanyl (DURAGESIC) 25 MCG/HR PATCH 72 HR Apply 1 Patch to skin as directed every 72 hours.     • lactulose 10 GM/15ML Solution Take 10 g by mouth 2 times a day as needed.     • vitamin D, Ergocalciferol, (DRISDOL) 17999 UNITS Cap capsule Take 50,000 Units by mouth every Friday.     • albuterol 108 (90 BASE) MCG/ACT Aero Soln inhalation aerosol Inhale 2 Puffs by mouth every 6 hours as needed for Shortness of Breath.     • cyanocobalamin (HM VITAMIN B12) 500 MCG tablet Take 1,000 mcg by mouth 2 times a day.     • sodium bicarbonate 325 MG Tab Take 2 Tabs by mouth 3 times a day.     • levothyroxine (SYNTHROID) 75 MCG Tab Take 75 mcg by mouth every morning.     • omeprazole (PRILOSEC) 20 MG delayed-release capsule Take 20 mg by mouth 2 times a day.       No current facility-administered medications for this visit.         Allergies as of 06/12/2017 - Joe as Reviewed 06/12/2017   Allergen Reaction Noted   • Cefdinir Shortness of Breath and Itching 03/01/2016   • Depakote [divalproex sodium] Unspecified 06/14/2010   • Abilify Unspecified 01/17/2013   • Amitriptyline Unspecified 10/31/2013   • Amoxicillin Rash 09/18/2010   • Ciprofloxacin Rash 12/17/2009   • Clindamycin Nausea 02/02/2011   • Doxycycline Vomiting and Nausea 08/15/2012   • Ees [erythromycin] Vomiting and Nausea 08/28/2010   • Flagyl [metronidazole hcl] Unspecified 03/31/2011   • Flomax [tamsulosin hydrochloride] Swelling 09/24/2009   • Metformin Unspecified 07/23/2013   • Sulfa drugs Hives and Rash 09/18/2010   • Tape Rash 08/15/2012   • Vancomycin Itching  "07/10/2016   • Cephalexin [keflex] Rash 01/01/2017   • Erythromycin  03/30/2017   • Levofloxacin Unspecified 10/27/2016   • Metronidazole  03/30/2017   • Valproic acid  03/30/2017        ROS: As per HPI.    /82 mmHg  Pulse 92  Temp(Src) 36.6 °C (97.9 °F)  Resp 16  Ht 1.651 m (5' 5\")  Wt 116.847 kg (257 lb 9.6 oz)  BMI 42.87 kg/m2  SpO2 98%  LMP 06/04/2016    Physical Exam:  Gen:         Alert and oriented, No apparent distress.  Neck:        No Lymphadenopathy or Bruits.  Lungs:     Clear to auscultation bilaterally  CV:          Regular rate and rhythm. No murmurs, rubs or gallops.               Ext:          No clubbing, cyanosis, edema.      Assessment and Plan.   26 y.o. female with the following issues.    1. Uncontrolled type 2 diabetes mellitus without complication, without long-term current use of insulin (CMS-[facility])  Increasing Januvia to 100 mg    - sitagliptin (JANUVIA) 100 MG Tab; Take 1 Tab by mouth every day.  Dispense: 30 Tab; Refill: 6    2. Yeast infection  Starting on Diflucan.  - fluconazole (DIFLUCAN) 150 MG tablet; Take 1 Tab by mouth every day. Take on day day one and day 3.  Dispense: 2 Tab; Refill: 0    3. Essential hypertension  Currently well controlled, Discuss diet, exercise and salt restriction.        "

## 2017-06-15 ENCOUNTER — OFFICE VISIT (OUTPATIENT)
Dept: URGENT CARE | Facility: CLINIC | Age: 27
End: 2017-06-15
Payer: MEDICARE

## 2017-06-15 ENCOUNTER — HOSPITAL ENCOUNTER (EMERGENCY)
Facility: MEDICAL CENTER | Age: 27
End: 2017-06-15
Attending: EMERGENCY MEDICINE
Payer: MEDICARE

## 2017-06-15 VITALS
BODY MASS INDEX: 42.98 KG/M2 | WEIGHT: 257.94 LBS | HEART RATE: 75 BPM | HEIGHT: 65 IN | RESPIRATION RATE: 18 BRPM | OXYGEN SATURATION: 97 % | SYSTOLIC BLOOD PRESSURE: 122 MMHG | TEMPERATURE: 96.8 F | DIASTOLIC BLOOD PRESSURE: 65 MMHG

## 2017-06-15 VITALS
TEMPERATURE: 97.3 F | DIASTOLIC BLOOD PRESSURE: 70 MMHG | RESPIRATION RATE: 16 BRPM | HEART RATE: 85 BPM | WEIGHT: 257 LBS | BODY MASS INDEX: 42.82 KG/M2 | OXYGEN SATURATION: 96 % | SYSTOLIC BLOOD PRESSURE: 124 MMHG | HEIGHT: 65 IN

## 2017-06-15 DIAGNOSIS — Z97.8 CHRONIC INDWELLING FOLEY CATHETER: ICD-10-CM

## 2017-06-15 DIAGNOSIS — N30.90 CYSTITIS: ICD-10-CM

## 2017-06-15 DIAGNOSIS — T83.518A: ICD-10-CM

## 2017-06-15 LAB
APPEARANCE UR: CLEAR
BACTERIA #/AREA URNS HPF: ABNORMAL /HPF
BILIRUB UR QL STRIP.AUTO: NEGATIVE
COLOR UR: YELLOW
CULTURE IF INDICATED INDCX: YES UA CULTURE
EPI CELLS #/AREA URNS HPF: ABNORMAL /HPF
GLUCOSE UR STRIP.AUTO-MCNC: NEGATIVE MG/DL
KETONES UR STRIP.AUTO-MCNC: ABNORMAL MG/DL
LEUKOCYTE ESTERASE UR QL STRIP.AUTO: ABNORMAL
MICRO URNS: ABNORMAL
MUCOUS THREADS #/AREA URNS HPF: ABNORMAL /HPF
NITRITE UR QL STRIP.AUTO: NEGATIVE
PH UR STRIP.AUTO: 5.5 [PH]
PROT UR QL STRIP: 70 MG/DL
RBC # URNS HPF: >150 /HPF
RBC UR QL AUTO: ABNORMAL
SP GR UR STRIP.AUTO: 1.03
WBC #/AREA URNS HPF: ABNORMAL /HPF

## 2017-06-15 PROCEDURE — 99284 EMERGENCY DEPT VISIT MOD MDM: CPT

## 2017-06-15 PROCEDURE — 87086 URINE CULTURE/COLONY COUNT: CPT

## 2017-06-15 PROCEDURE — 81001 URINALYSIS AUTO W/SCOPE: CPT

## 2017-06-15 PROCEDURE — 99214 OFFICE O/P EST MOD 30 MIN: CPT | Performed by: PHYSICIAN ASSISTANT

## 2017-06-15 RX ORDER — NITROFURANTOIN MACROCRYSTALS 100 MG/1
100 CAPSULE ORAL 4 TIMES DAILY
Qty: 28 CAP | Refills: 0 | Status: SHIPPED | OUTPATIENT
Start: 2017-06-15 | End: 2017-06-22

## 2017-06-15 RX ORDER — NITROFURANTOIN 25; 75 MG/1; MG/1
100 CAPSULE ORAL ONCE
Status: DISCONTINUED | OUTPATIENT
Start: 2017-06-15 | End: 2017-06-15 | Stop reason: HOSPADM

## 2017-06-15 RX ORDER — DOXYCYCLINE HYCLATE 100 MG
100 TABLET ORAL 2 TIMES DAILY
Qty: 20 TAB | Refills: 0 | Status: SHIPPED | OUTPATIENT
Start: 2017-06-15 | End: 2017-06-28

## 2017-06-15 ASSESSMENT — ENCOUNTER SYMPTOMS
WHEEZING: 0
NAUSEA: 0
FEVER: 0
NEUROLOGICAL NEGATIVE: 1
ABDOMINAL PAIN: 0
BRUISES/BLEEDS EASILY: 0
VOMITING: 0
HEADACHES: 0
CHILLS: 0
COUGH: 0
SHORTNESS OF BREATH: 1

## 2017-06-15 ASSESSMENT — PAIN SCALES - GENERAL: PAINLEVEL_OUTOF10: 9

## 2017-06-15 ASSESSMENT — LIFESTYLE VARIABLES: DO YOU DRINK ALCOHOL: NO

## 2017-06-15 NOTE — DISCHARGE INSTRUCTIONS
Urinary Tract Infection  Urinary tract infections (UTIs) can develop anywhere along your urinary tract. Your urinary tract is your body's drainage system for removing wastes and extra water. Your urinary tract includes two kidneys, two ureters, a bladder, and a urethra. Your kidneys are a pair of bean-shaped organs. Each kidney is about the size of your fist. They are located below your ribs, one on each side of your spine.  CAUSES  Infections are caused by microbes, which are microscopic organisms, including fungi, viruses, and bacteria. These organisms are so small that they can only be seen through a microscope. Bacteria are the microbes that most commonly cause UTIs.  SYMPTOMS   Symptoms of UTIs may vary by age and gender of the patient and by the location of the infection. Symptoms in young women typically include a frequent and intense urge to urinate and a painful, burning feeling in the bladder or urethra during urination. Older women and men are more likely to be tired, shaky, and weak and have muscle aches and abdominal pain. A fever may mean the infection is in your kidneys. Other symptoms of a kidney infection include pain in your back or sides below the ribs, nausea, and vomiting.  DIAGNOSIS  To diagnose a UTI, your caregiver will ask you about your symptoms. Your caregiver also will ask to provide a urine sample. The urine sample will be tested for bacteria and white blood cells. White blood cells are made by your body to help fight infection.  TREATMENT   Typically, UTIs can be treated with medication. Because most UTIs are caused by a bacterial infection, they usually can be treated with the use of antibiotics. The choice of antibiotic and length of treatment depend on your symptoms and the type of bacteria causing your infection.  HOME CARE INSTRUCTIONS  · If you were prescribed antibiotics, take them exactly as your caregiver instructs you. Finish the medication even if you feel better after you  have only taken some of the medication.  · Drink enough water and fluids to keep your urine clear or pale yellow.  · Avoid caffeine, tea, and carbonated beverages. They tend to irritate your bladder.  · Empty your bladder often. Avoid holding urine for long periods of time.  · Empty your bladder before and after sexual intercourse.  · After a bowel movement, women should cleanse from front to back. Use each tissue only once.  SEEK MEDICAL CARE IF:   · You have back pain.  · You develop a fever.  · Your symptoms do not begin to resolve within 3 days.  SEEK IMMEDIATE MEDICAL CARE IF:   · You have severe back pain or lower abdominal pain.  · You develop chills.  · You have nausea or vomiting.  · You have continued burning or discomfort with urination.  MAKE SURE YOU:   · Understand these instructions.  · Will watch your condition.  · Will get help right away if you are not doing well or get worse.     This information is not intended to replace advice given to you by your health care provider. Make sure you discuss any questions you have with your health care provider.     Document Released: 09/27/2006 Document Revised: 01/08/2016 Document Reviewed: 01/25/2013  [employer] Patient Education ©2016 [employer]

## 2017-06-15 NOTE — ED PROVIDER NOTES
ED Provider Note    Scribed for Robin Sanchez M.D. by Sinai York. 6/15/2017  2:35 PM    Primary care provider: Torres Brody M.D.  Means of arrival: Walk-In  History obtained from: Patient  History limited by: Poor historian    CHIEF COMPLAINT  Chief Complaint   Patient presents with   • Wound Infection     to the suprapubic area where catheter is   • Shortness of Breath       HPI  Kristin Balderrama is a 26 y.o. female who presents to the Emergency Department for suprapubic wound infection where her catheter is. She has had her cath in for about a year due to incontinence. Patient is unsure how long she has been in [place] and states she is between urologists right now but confirms urologist at [facility]. Since being in [place], she has tried to establish a urologist, Dr. Rosenbaum. Confirms taking two 20 mg oxycodone daily for chronic pain, her last dose being around 9:00 AM this morning. Patient was seen at Urgent Care for these symptoms and was diagnosed with a UTI. She notes taking her last dose of antibiotics a couple days ago but has since been experincing shortness of breath and sweats. Confirms wearing oxygen all the time but does not know for what diagnosis she was placed on oxygen. Denies leg pain.    History is limited secondary to patient being a poor historian.    REVIEW OF SYSTEMS  Pertinent negatives include no leg pain. As above, all other systems reviewed and are negative.   See HPI for further details.     ROS limited secondary to patient being a poor historian.     PAST MEDICAL HISTORY   has a past medical history of Scoliosis; Multiple personality disorder; Chronic UTI (9/18/2010); Heart burn; Pain (08-15-12); History of falling; Sinus tachycardia (10/31/2013); Urinary incontinence; Hypertension; Disorder of thyroid; Obesity; Pneumonia (2012); ASTHMA; Breath shortness; Anginal syndrome; Psychosis; Arthritis; PCOS (polycystic ovarian syndrome); Gynecological disorder; Renal disorder; Arrhythmia;  "Urinary bladder disorder; Tuberculosis; Sleep apnea; Mitochondrial disease; and Fall.    SURGICAL HISTORY   has past surgical history that includes neuro dest facet l/s w/ig sngl (4/21/2015); recovery (1/27/2016); demlar by laparoscopy (8/29/2010); lumbar fusion anterior (8/21/2012); other cardiac surgery (1/2016); tonsillectomy; bowel stimulator insertion (7/13/2016); gastroscopy with balloon dilatation (N/A, 1/4/2017); muscle biopsy (Right, 1/26/2017); and other abdominal surgery.    SOCIAL HISTORY  Social History   Substance Use Topics   • Smoking status: Passive Smoke Exposure - Never Smoker     Types: Cigarettes   • Smokeless tobacco: Never Used   • Alcohol Use: No      History   Drug Use No       FAMILY HISTORY  Family History   Problem Relation Age of Onset   • Hypertension Mother    • Sleep Apnea Mother    • Heart Disease Mother    • Other Mother      hypothryod   • Hypertension Maternal Uncle    • Heart Disease Maternal Grandmother    • Hypertension Maternal Grandmother    • Other Sister      Narcolepsy;fibromyalsia;bone;nerve   • Genitourinary () Sister      endometriosis       CURRENT MEDICATIONS  Home Medications     **Home medications have not yet been reviewed for this encounter**          ALLERGIES  Allergies   Allergen Reactions   • Cefdinir Shortness of Breath and Itching     Tolerated 1/18/17   • Depakote [Divalproex Sodium] Unspecified     Muscle spasms/muscle pain and weakness     • Abilify Unspecified     Headaches/muscle twitching     • Amitriptyline Unspecified     Headaches     • Amoxicillin Rash     Pt states \"I get a rash\".     • Ciprofloxacin Rash     Pt states \"I get a rash\".     • Clindamycin Nausea     Even with food     • Doxycycline Vomiting and Nausea     RXN=unknown   • Ees [Erythromycin] Vomiting and Nausea   • Flagyl [Metronidazole Hcl] Unspecified     \"eye problems\"     • Flomax [Tamsulosin Hydrochloride] Swelling   • Metformin Unspecified     Increased lactic acid      • Sulfa " "Drugs Hives and Rash     RXN=since childhood   • Tape Rash     Tears skin off   coban with Tegaderm tape ok  RXN=ongoing   • Vancomycin Itching     Pt becomes flushed in face and chest.   RXN=7/10/16   • Cephalexin [Keflex] Rash     Pt states she gets a rash with this medication   • Erythromycin    • Levofloxacin Unspecified     Leg muscle cramps   • Metronidazole    • Valproic Acid        PHYSICAL EXAM  VITAL SIGNS: /65 mmHg  Pulse 81  Temp(Src) 36 °C (96.8 °F)  Resp 18  Ht 1.651 m (5' 5\")  Wt 117 kg (257 lb 15 oz)  BMI 42.92 kg/m2  SpO2 97%  LMP 06/04/2016    Constitutional: Appears anxious. Well developed, Well nourished, Non-toxic appearance.   HENT: Normocephalic, Atraumatic, Bilateral external ears normal, Oropharynx is clear mucous membranes are moist. No oral exudates or nasal discharge.   Eyes: Pupils are equal round and reactive, EOMI, Conjunctiva normal, No discharge.   Neck: Normal range of motion, No tenderness, Supple, No stridor. No meningismus.  Lymphatic: No lymphadenopathy noted.   Cardiovascular: Mildly tachypnic. Regular rate without murmur rub or gallop.  Thorax & Lungs: Clear breath sounds bilaterally without wheezes, rhonchi or rales. There is no chest wall tenderness.   Abdomen: Suprapubic catheter. No surrounding cellulitis. No discharge.Soft non-tender non-distended. There is no rebound or guarding. No organomegaly is appreciated. Bowel sounds are normal.  Skin: Normal without rash.   Back: No CVA or spinal tenderness.   Extremities: Intact distal pulses, No edema, No tenderness, No cyanosis, No clubbing. Capillary refill is less than 2 seconds.  Musculoskeletal: Good range of motion in all major joints. No tenderness to palpation or major deformities noted.   Neurologic: Alert & oriented x 3, Normal motor function, Normal sensory function, No focal deficits noted. Reflexes are normal.  Psychiatric: Affect normal, Judgment normal, Mood normal. There is no suicidal ideation or " patient reported hallucinations.       DIAGNOSTIC STUDIES / PROCEDURES    LABS  Labs Reviewed   URINALYSIS,CULTURE IF INDICATED - Abnormal; Notable for the following:     Ketones Trace (*)     Protein 70 (*)     Leukocyte Esterase Moderate (*)     Occult Blood Moderate (*)     All other components within normal limits   URINE MICROSCOPIC (W/UA) - Abnormal; Notable for the following:     WBC 10-20 (*)     RBC >150 (*)     Bacteria Moderate (*)     All other components within normal limits   URINE CULTURE(NEW)      All labs reviewed by me.    COURSE & MEDICAL DECISION MAKING  Nursing notes, VS, PMSFHx reviewed in chart.    2:39 PM - Patient seen and examined at bedside. Ordered for urinalysis to evaluate.     Urinalysis demonstrates moderate leukocyte esterase and 10-20 white cells per high-power field with moderate bacteria noted. Patient does have some trace proteinuria and ketones    3:47 PM - Reviewed lab results which indicated a complex UTI with indwelling. She will be discharged home at this time with Macrodantin. She is encouraged to follow-up with Dr. Schuler, urology and she understands her plan of care    Of note I encouraged the patient not to drive as she is on chronic narcotics and seems to confused to be effective on the roadway. She could not tell me how long she lives in [place], and was generally confused about the time lines of her care.    The patient will return for new or worsening symptoms and is stable at the time of discharge.    DISPOSITION:  Patient will be discharged home in stable condition.    FINAL IMPRESSION  1. Cystitis    2. Chronic indwelling Andrade catheter          Sinai RODRIGUEZ (Eva), am scribing for, and in the presence of, Robin Sanchez M.D..    Electronically signed by: Sinai York (Eva), 6/15/2017    Robin RODRIGUEZ M.D. personally performed the services described in this documentation, as scribed by Sinai York in my presence, and it is both accurate and  complete.    The note accurately reflects work and decisions made by me.  Robin Sanchez  6/15/2017  4:10 PM

## 2017-06-15 NOTE — ED NOTES
27 y/o female ambulatory to triage using walker with c/o infection to the insertion site of suprapubic catheter along with increasing sob. Pt wears 2L o2 24/7. Pt was given doxycycline at urgent care but states she vomited the medication up.

## 2017-06-15 NOTE — PROGRESS NOTES
"Subjective:      Kristin Balderrama is a 26 y.o. female who presents with Vaginal Discharge and Shortness of Breath          Shortness of Breath  Associated symptoms include a rash. Pertinent negatives include no abdominal pain, fever, headaches, vomiting or wheezing.   Patient with extensive and complex medical history presents today for 3-4 days of vaginal/suprapubic swelling, tenderness and discharge.  Patient was seen 2 days of ago and placed on Diflucan, she also just finished abx for UTI.   Patient has suprapubic catheter and this is actually the area of her swelling and discharge.  She states she has been feeling more tired, winded in last few days.  Asked her PCP if she needed to increase her O2 however they said it was ok when seen 3 days ago..  No cough.  No wheezing or chest tightness.  No chest pain.  No dizziness or fainting.  She has not had fevers but states \"I never have an elevated temp\"    Review of Systems   Constitutional: Positive for malaise/fatigue. Negative for fever and chills.   Respiratory: Positive for shortness of breath. Negative for cough and wheezing.    Gastrointestinal: Negative for nausea, vomiting and abdominal pain.   Genitourinary:        SEE HPI   Skin: Positive for rash. Negative for itching.   Neurological: Negative.  Negative for headaches.   Endo/Heme/Allergies: Does not bruise/bleed easily.       PMH:  has a past medical history of Scoliosis; Multiple personality disorder; Chronic UTI (9/18/2010); Heart burn; Pain (08-15-12); History of falling; Sinus tachycardia (10/31/2013); Urinary incontinence; Hypertension; Disorder of thyroid; Obesity; Pneumonia (2012); ASTHMA; Breath shortness; Anginal syndrome; Psychosis; Arthritis; PCOS (polycystic ovarian syndrome); Gynecological disorder; Renal disorder; Arrhythmia; Urinary bladder disorder; Tuberculosis; Sleep apnea; Mitochondrial disease; and Fall.  MEDS:   Current outpatient prescriptions:   •  doxycycline (VIBRAMYCIN) 100 " MG Tab, Take 1 Tab by mouth 2 times a day., Disp: 20 Tab, Rfl: 0  •  fluconazole (DIFLUCAN) 150 MG tablet, Take 1 Tab by mouth every day. Take on day day one and day 3., Disp: 2 Tab, Rfl: 0  •  sitagliptin (JANUVIA) 100 MG Tab, Take 1 Tab by mouth every day., Disp: 30 Tab, Rfl: 6  •  nitrofurantoin monohydr macro (MACROBID) 100 MG Cap, Take 1 Cap by mouth 2 times a day for 10 days. (Patient taking differently: Take 200 mg by mouth 2 times a day.), Disp: 20 Cap, Rfl: 0  •  Mirabegron ER (MYRBETRIQ) 25 MG TABLET SR 24 HR, Take 1 Tab by mouth every day., Disp: , Rfl:   •  oxycodone-acetaminophen (PERCOCET-10)  MG Tab, Take 2 Tabs by mouth every 6 hours as needed for Severe Pain., Disp: , Rfl:   •  promethazine (PHENERGAN) 25 MG Tab, Take 1 Tab by mouth every 6 hours as needed for Nausea/Vomiting., Disp: 30 Tab, Rfl: 6  •  aspirin EC (ECOTRIN) 81 MG Tablet Delayed Response, Take 1 Tab by mouth every day., Disp: 30 Tab, Rfl: 6  •  liraglutide (VICTOZA) 18 MG/3ML Solution Pen-injector injection, Inject 0.3 mL as instructed every day., Disp: 3 PEN, Rfl: 11  •  baclofen (LIORESAL) 10 MG Tab, Take 1 Tab by mouth 3 times a day., Disp: 90 Tab, Rfl: 6  •  CRANBERRY PO, Take 100,400 mg by mouth 2 times a day., Disp: , Rfl:   •  ivabradine (CORLANOR) 5 MG Tab tablet, Take 1 Tab by mouth 2 times a day, with meals., Disp: 60 Tab, Rfl: 6  •  fluoxetine (PROZAC) 10 MG Cap, TAKE ONE CAPSULE BY MOUTH ONCE A DAY, Disp: 30 Cap, Rfl: 2  •  ranitidine (ZANTAC) 150 MG Tab, Take 150 mg by mouth 2 times a day., Disp: , Rfl:   •  Melatonin 5 MG Tab, Take 10 mg by mouth every bedtime., Disp: , Rfl:   •  ziprasidone (GEODON) 80 MG Cap, Take 160 mg by mouth every evening. Takes this at 1700 daily, Disp: , Rfl:   •  fentanyl (DURAGESIC) 25 MCG/HR PATCH 72 HR, Apply 1 Patch to skin as directed every 72 hours., Disp: , Rfl:   •  lactulose 10 GM/15ML Solution, Take 10 g by mouth 2 times a day as needed., Disp: , Rfl:   •  vitamin D,  "Ergocalciferol, (DRISDOL) 52309 UNITS Cap capsule, Take 50,000 Units by mouth every Friday., Disp: , Rfl:   •  albuterol 108 (90 BASE) MCG/ACT Aero Soln inhalation aerosol, Inhale 2 Puffs by mouth every 6 hours as needed for Shortness of Breath., Disp: , Rfl:   •  cyanocobalamin (HM VITAMIN B12) 500 MCG tablet, Take 1,000 mcg by mouth 2 times a day., Disp: , Rfl:   •  sodium bicarbonate 325 MG Tab, Take 2 Tabs by mouth 3 times a day., Disp: , Rfl:   •  levothyroxine (SYNTHROID) 75 MCG Tab, Take 75 mcg by mouth every morning., Disp: , Rfl:   •  omeprazole (PRILOSEC) 20 MG delayed-release capsule, Take 20 mg by mouth 2 times a day., Disp: , Rfl:   ALLERGIES:   Allergies   Allergen Reactions   • Cefdinir Shortness of Breath and Itching     Tolerated 1/18/17   • Depakote [Divalproex Sodium] Unspecified     Muscle spasms/muscle pain and weakness     • Abilify Unspecified     Headaches/muscle twitching     • Amitriptyline Unspecified     Headaches     • Amoxicillin Rash     Pt states \"I get a rash\".     • Ciprofloxacin Rash     Pt states \"I get a rash\".     • Clindamycin Nausea     Even with food     • Doxycycline Vomiting and Nausea     RXN=unknown   • Ees [Erythromycin] Vomiting and Nausea   • Flagyl [Metronidazole Hcl] Unspecified     \"eye problems\"     • Flomax [Tamsulosin Hydrochloride] Swelling   • Metformin Unspecified     Increased lactic acid      • Sulfa Drugs Hives and Rash     RXN=since childhood   • Tape Rash     Tears skin off   coban with Tegaderm tape ok  RXN=ongoing   • Vancomycin Itching     Pt becomes flushed in face and chest.   RXN=7/10/16   • Cephalexin [Keflex] Rash     Pt states she gets a rash with this medication   • Erythromycin    • Levofloxacin Unspecified     Leg muscle cramps   • Metronidazole    • Valproic Acid      SURGHX:   Past Surgical History   Procedure Laterality Date   • Neuro dest facet l/s w/ig sngl  4/21/2015     Performed by Reza Tabor at [facility]   • Recovery  " "1/27/2016     Procedure: CATH LAB EP STUDY WITH SINUS NODE MODIFICATION ABHINAV;  Surgeon: Lina Surgery;  [place] [facility] [place]  Service:    • Katie by laparoscopy  8/29/2010     Performed by SHAYY JOHNS at [facility]   • Lumbar fusion anterior  8/21/2012     Performed by SUSIE SAWANT at [facility]   • Other cardiac surgery  1/2016     cardiac ablation   • Tonsillectomy       tonsillectomy   • Bowel stimulator insertion  7/13/2016     Procedure: BOWEL STIMULATOR INSERTION FOR PERMANENT INTERSTIM SACRAL IMPLANT;  Surgeon: Joe Noyola M.D.;  Location: SURGERY [facility];  Service:    • Gastroscopy with balloon dilatation N/A 1/4/2017     Procedure: GASTROSCOPY WITH DILATATION;  Surgeon: Torres Vargas M.D.;  Location: SURGERY [facility];  Service:    • Muscle biopsy Right 1/26/2017     Procedure: MUSCLE BIOPSY - THIGH;  Surgeon: Isidro Vigil M.D.;  Location: SURGERY [facility];  Service:    • Other abdominal surgery       SOCHX:  reports that she has been passively smoking Cigarettes.  She has never used smokeless tobacco. She reports that she does not drink alcohol or use illicit drugs.  FH: Family history was reviewed, no pertinent findings to report     Objective:     /70 mmHg  Pulse 85  Temp(Src) 36.3 °C (97.3 °F)  Resp 16  Ht 1.651 m (5' 5\")  Wt 116.574 kg (257 lb)  BMI 42.77 kg/m2  SpO2 96%  LMP 06/04/2016  Breastfeeding? No     Physical Exam   Constitutional: She is oriented to person, place, and time. She appears well-developed and well-nourished. No distress.   HENT:   Head: Normocephalic and atraumatic.   Eyes: EOM are normal. Pupils are equal, round, and reactive to light.   Cardiovascular: Normal rate and regular rhythm.    Pulmonary/Chest: Effort normal and breath sounds normal. No respiratory distress. She has no wheezes. She has no rales.   Neurological: She is alert and oriented to person, place, and time. " "  Skin: Skin is warm and dry.        Psychiatric: She has a normal mood and affect. Her behavior is normal.   Vitals reviewed.       Assessment/Plan:     1. Infection of urinary catheter, initial encounter ([facility])  doxycycline (VIBRAMYCIN) 100 MG Tab       -patient recently completed Macrobid for UTI and was placed on Diflucan after reporting \"discharge\"  Apparently patient states she was referring to this catheter discharge and had not had pelvic exam that day.   -patient with very complex medical history, at risk for infection with uncontrolled DM and various other co-morbidities.  Infection appears localized and vitals within normal limits.  She does not meet any criteria for sepsis at this time.  She understands her immense risk of progressive infection.   She has multiple drug allergies.  We discussed Clinda and Doxy as these are n/v side effects rather than true allergies.   She states Clinda \"tore her stomach apart\" and will not take this but willing to do trial of doxy.  She will take her nausea medication at home as well   -she understands if she has vomiting, cannot take meds or at ANY time the redness, swelling or pain worsens she is to go straight to ER.   -I also recommend she follow up with PCP end of week or early next regardless for recheck.      Supportive care, differential diagnoses, and indications for immediate follow-up discussed with patient.   Pathogenesis of diagnosis discussed including typical length and natural progression.   Instructed to return to clinic or nearest emergency department for any change in condition, further concerns, or worsening of symptoms.  Patient states understanding of the plan of care and discharge instructions.  Instructed to make an appointment, for follow up, with their primary care provider.      Ruth Ann Renee PA-C        "

## 2017-06-15 NOTE — ED AVS SNAPSHOT
6/15/2017    Kristin Balderrama  [address] [address]    Dear Kristin:    [facility] wants to ensure your discharge home is safe and you or your loved ones have had all of your questions answered regarding your care after you leave the hospital.    Below is a list of resources and contact information should you have any questions regarding your hospital stay, follow-up instructions, or active medical symptoms.    Questions or Concerns Regarding… Contact   Medical Questions Related to Your Discharge  (7 days a week, 8am-5pm) Contact a Nurse Care Coordinator   [phone]   Medical Questions Not Related to Your Discharge  (24 hours a day / 7 days a week)  Contact the Nurse Health Line   [phone]    Medications or Discharge Instructions Refer to your discharge packet   or contact your [facility] Primary Care Provider   [phone]   Follow-up Appointment(s) Schedule your appointment via [employer]   or contact Scheduling [phone]   Billing Review your statement via [employer]  or contact Billing [phone]   Medical Records Review your records via [employer]   or contact Medical Records [phone]     You may receive a telephone call within two days of discharge. This call is to make certain you understand your discharge instructions and have the opportunity to have any questions answered. You can also easily access your medical information, test results and upcoming appointments via the [employer] free online health management tool. You can learn more and sign up at [email or web address]. For assistance setting up your [employer] account, please call [phone].    Once again, we want to ensure your discharge home is safe and that you have a clear understanding of any next steps in your care. If you have any questions or concerns, please do not hesitate to contact us, we are here for you. Thank you for choosing [facility] for your healthcare needs.    Sincerely,    Your [facility] Healthcare Team

## 2017-06-15 NOTE — ED AVS SNAPSHOT
Home Care Instructions                                                                                                                Kristin Balderrama   MRN: [id number]    Department:  [facility], Emergency Dept   Date of Visit:  6/15/2017            [facility], Emergency Dept    [address]    [facility] [address]    Phone:  [phone]      You were seen by     Robin Sanchez M.D.      Your Diagnosis Was     Cystitis     N30.90       Follow-up Information     1. Schedule an appointment as soon as possible for a visit with Torres Schuler M.D..    Specialty:  Urology    Contact information    [address]  [facility] [address] [phone]        Medication Information     Review all of your home medications and newly ordered medications with your primary doctor and/or pharmacist as soon as possible. Follow medication instructions as directed by your doctor and/or pharmacist.     Please keep your complete medication list with you and share with your physician. Update the information when medications are discontinued, doses are changed, or new medications (including over-the-counter products) are added; and carry medication information at all times in the event of emergency situations.               Medication List      START taking these medications        Instructions    Morning Afternoon Evening Bedtime    nitrofurantoin macro crystals 100 MG Caps   Commonly known as:  MACRODANTIN        Take 1 Cap by mouth 4 times a day for 7 days.   Dose:  100 mg                          ASK your doctor about these medications        Instructions    Morning Afternoon Evening Bedtime    albuterol 108 (90 BASE) MCG/ACT Aers inhalation aerosol        Inhale 2 Puffs by mouth every 6 hours as needed for Shortness of Breath.   Dose:  2 Puff                        aspirin EC 81 MG Tbec   Commonly known as:  ECOTRIN        Take 1 Tab by mouth every day.   Dose:  81 mg                        baclofen 10 MG Tabs   Commonly known as:  LIORESAL        Take 1 Tab by mouth 3 times a day.   Dose:  10 mg                        CRANBERRY PO        Take 100,400 mg by mouth 2 times a day.   Dose:  873212 mg                        doxycycline 100 MG Tabs   Commonly known as:  VIBRAMYCIN        Take 1 Tab by mouth 2 times a day.   Dose:  100 mg                        fentanyl 25 MCG/HR Pt72   Commonly known as:  DURAGESIC        Apply 1 Patch to skin as directed every 72 hours.   Dose:  1 Patch                        fluconazole 150 MG tablet   Commonly known as:  DIFLUCAN        Take 1 Tab by mouth every day. Take on day day one and day 3.   Dose:  150 mg                        fluoxetine 10 MG Caps   Commonly known as:  PROZAC        TAKE ONE CAPSULE BY MOUTH ONCE A DAY                        HM VITAMIN B12 500 MCG tablet   Generic drug:  cyanocobalamin        Take 1,000 mcg by mouth 2 times a day.   Dose:  1000 mcg                        ivabradine 5 MG Tabs tablet   Commonly known as:  CORLANOR        Take 1 Tab by mouth 2 times a day, with meals.   Dose:  5 mg                        lactulose 10 GM/15ML Soln        Take 10 g by mouth 2 times a day as needed.   Dose:  10 g                        levothyroxine 75 MCG Tabs   Commonly known as:  SYNTHROID        Take 75 mcg by mouth every morning.   Dose:  75 mcg                        liraglutide 18 MG/3ML Sopn injection   Commonly known as:  VICTOZA        Inject 0.3 mL as instructed every day.   Dose:  1.8 mg                        Melatonin 5 MG Tabs        Doctor's comments:  Takes two tabs at Bedtime (10 mg.)   Take 10 mg by mouth every bedtime.   Dose:  10 mg                        MYRBETRIQ 25 MG Tb24   Generic drug:  Mirabegron ER        Take 1 Tab by mouth every day.   Dose:  1 Tab                        nitrofurantoin monohydr macro 100 MG Caps   Commonly known as:  MACROBID        Take 1 Cap by mouth 2 times a day for 10 days.   Dose:  100 mg                          omeprazole 20 MG delayed-release capsule   Commonly known as:  PRILOSEC        Take 20 mg by mouth 2 times a day.   Dose:  20 mg                        oxycodone-acetaminophen  MG Tabs   Commonly known as:  PERCOCET-10        Take 2 Tabs by mouth every 6 hours as needed for Severe Pain.   Dose:  2 Tab                        promethazine 25 MG Tabs   Commonly known as:  PHENERGAN        Take 1 Tab by mouth every 6 hours as needed for Nausea/Vomiting.   Dose:  25 mg                        ranitidine 150 MG Tabs   Commonly known as:  ZANTAC        Take 150 mg by mouth 2 times a day.   Dose:  150 mg                        sitagliptin 100 MG Tabs   Commonly known as:  JANUVIA        Take 1 Tab by mouth every day.   Dose:  100 mg                        sodium bicarbonate 325 MG Tabs        Take 2 Tabs by mouth 3 times a day.   Dose:  650 mg                        vitamin D (Ergocalciferol) 72601 UNITS Caps capsule   Commonly known as:  DRISDOL        Take 50,000 Units by mouth every Friday.   Dose:  68772 Units                        ziprasidone 80 MG Caps   Commonly known as:  GEODON        Take 160 mg by mouth every evening. Takes this at 1700 daily   Dose:  160 mg                             Where to Get Your Medications      You can get these medications from any pharmacy     Bring a paper prescription for each of these medications    - nitrofurantoin macro crystals 100 MG Caps            Procedures and tests performed during your visit     PO CHALLENGE    URINALYSIS,CULTURE IF INDICATED    URINE CULTURE(NEW)    URINE MICROSCOPIC (W/UA)        Discharge Instructions       Urinary Tract Infection  Urinary tract infections (UTIs) can develop anywhere along your urinary tract. Your urinary tract is your body's drainage system for removing wastes and extra water. Your urinary tract includes two kidneys, two ureters, a bladder, and a urethra. Your kidneys are a pair of bean-shaped organs. Each kidney is  about the size of your fist. They are located below your ribs, one on each side of your spine.  CAUSES  Infections are caused by microbes, which are microscopic organisms, including fungi, viruses, and bacteria. These organisms are so small that they can only be seen through a microscope. Bacteria are the microbes that most commonly cause UTIs.  SYMPTOMS   Symptoms of UTIs may vary by age and gender of the patient and by the location of the infection. Symptoms in young women typically include a frequent and intense urge to urinate and a painful, burning feeling in the bladder or urethra during urination. Older women and men are more likely to be tired, shaky, and weak and have muscle aches and abdominal pain. A fever may mean the infection is in your kidneys. Other symptoms of a kidney infection include pain in your back or sides below the ribs, nausea, and vomiting.  DIAGNOSIS  To diagnose a UTI, your caregiver will ask you about your symptoms. Your caregiver also will ask to provide a urine sample. The urine sample will be tested for bacteria and white blood cells. White blood cells are made by your body to help fight infection.  TREATMENT   Typically, UTIs can be treated with medication. Because most UTIs are caused by a bacterial infection, they usually can be treated with the use of antibiotics. The choice of antibiotic and length of treatment depend on your symptoms and the type of bacteria causing your infection.  HOME CARE INSTRUCTIONS  · If you were prescribed antibiotics, take them exactly as your caregiver instructs you. Finish the medication even if you feel better after you have only taken some of the medication.  · Drink enough water and fluids to keep your urine clear or pale yellow.  · Avoid caffeine, tea, and carbonated beverages. They tend to irritate your bladder.  · Empty your bladder often. Avoid holding urine for long periods of time.  · Empty your bladder before and after sexual  intercourse.  · After a bowel movement, women should cleanse from front to back. Use each tissue only once.  SEEK MEDICAL CARE IF:   · You have back pain.  · You develop a fever.  · Your symptoms do not begin to resolve within 3 days.  SEEK IMMEDIATE MEDICAL CARE IF:   · You have severe back pain or lower abdominal pain.  · You develop chills.  · You have nausea or vomiting.  · You have continued burning or discomfort with urination.  MAKE SURE YOU:   · Understand these instructions.  · Will watch your condition.  · Will get help right away if you are not doing well or get worse.     This information is not intended to replace advice given to you by your health care provider. Make sure you discuss any questions you have with your health care provider.     Document Released: 09/27/2006 Document Revised: 01/08/2016 Document Reviewed: 01/25/2013  [employer] Interactive Patient Education ©2016 [employer]            Patient Information     Patient Information    Following emergency treatment: all patient requiring follow-up care must return either to a private physician or a clinic if your condition worsens before you are able to obtain further medical attention, please return to the emergency room.     Billing Information    At [facility], we work to make the billing process streamlined for our patients.  Our Representatives are here to answer any questions you may have regarding your hospital bill.  If you have insurance coverage and have supplied your insurance information to us, we will submit a claim to your insurer on your behalf.  Should you have any questions regarding your bill, we can be reached online or by phone as follows:  Online: You are able pay your bills online or live chat with our representatives about any billing questions you may have. We are here to help Monday - Friday from 8:00am to 7:30pm and 9:00am - 12:00pm on Saturdays.  Please visit [email or web address]   for more information.   Phone:  [phone] or [phone]    Please note that your emergency physician, surgeon, pathologist, radiologist, anesthesiologist, and other specialists are not employed by [facility] and will therefore bill separately for their services.  Please contact them directly for any questions concerning their bills at the numbers below:     Emergency Physician Services:  [phone]  [facility]:  [phone]  [facility]:  [phone]  [facility]:  [phone]    1. Your final bill may vary from the amount quoted upon discharge if all procedures are not complete at that time, or if your doctor has additional procedures of which we are not aware. You will receive an additional bill if you return to the Emergency Department at [facility] for suture removal regardless of the facility of which the sutures were placed.     2. Please arrange for settlement of this account at the emergency registration.    3. All self-pay accounts are due in full at the time of treatment.  If you are unable to meet this obligation then payment is expected within 4-5 days.     4. If you have had radiology studies (CT, X-ray, Ultrasound, MRI), you have received a preliminary result during your emergency department visit. Please contact the radiology department [phone] to receive a copy of your final result. Please discuss the Final result with your primary physician or with the follow up physician provided.     Crisis Hotline:  [place] Crisis Hotline:  8-845-LDIUHQX or 1-378.297.5732  [place] Crisis Hotline:    1-801.338.7076 or 291-205-6579         ED Discharge Follow Up Questions    1. In order to provide you with very good care, we would like to follow up with a phone call in the next few days.  May we have your permission to contact you?     YES /  NO    2. What is the best phone number to call you? (       )_____-__________    3. What is the best time  to call you?      Morning  /  Afternoon  /  Evening                   Patient Signature:  ____________________________________________________________    Date:  ____________________________________________________________      Your appointments     Jul 06, 2017  6:45 AM   Adult Draw/Collection with [facility]   [facility] (--)    [address] [facility]  [place] [address]   [phone]            Jul 14, 2017 10:00 AM   Follow Up Med Management with Ronny Burnette M.D.   BE[facility]IORAL [facility] [address] [place] [address])    [address] [place] [address]  [address]  [place] [address]   [phone]            Jul 17, 2017 10:00 AM   Individual Therapy with Blanca Brantley L.C.S.W.   [facility] ([facility])    [address]  [address]  [place] [address]   [phone]            Jul 19, 2017  8:40 AM   FOLLOW UP with Torres Kumar M.D.   [facility] (--)    [address] [place] [address] [address]  [place] [address]   [phone]            Aug 11, 2017 10:00 AM   Follow Up Visit with Sandoval Fox M.D.   [facility] (--)    [address], [address]  [place] [address] [phone]           You will be receiving a confirmation call a few days before your appointment from our automated call confirmation system.            Aug 23, 2017  9:00 AM   Established Patient with Torres Brody M.D.   [facility] [address]    [address] [facility] [address]  [place] [address] [phone]           You will be receiving a confirmation call a few days before your appointment from our automated call confirmation system.

## 2017-06-15 NOTE — ED NOTES
To room in .  Agree with triage assessment.  Suprapubic cath in place.  No obvious s/s of infection at cath insertion site.  SPO2 99% on 2L baseline home O2.  Reports urologist at [facility].  Has not yet established with urologist in [place].

## 2017-06-15 NOTE — ED AVS SNAPSHOT
[employer] Access Code: Activation code not generated  Current [employer] Status: Active    [employer]  A secure, online tool to manage your health information     [employer]’s [employer]® is a secure, online tool that connects you to your personalized health information from the privacy of your home -- day or night - making it very easy for you to manage your healthcare. Once the activation process is completed, you can even access your medical information using the [employer] rocio, which is available for free in the Apple Rocio store or Google Play store.     [employer] provides the following levels of access (as shown below):   My Chart Features   [facility] Primary Care Doctor [facility]  Specialists [facility]  Urgent  Care Non-[facility]  Primary Care  Doctor   Email your healthcare team securely and privately 24/7 X X X X   Manage appointments: schedule your next appointment; view details of past/upcoming appointments X      Request prescription refills. X      View recent personal medical records, including lab and immunizations X X X X   View health record, including health history, allergies, medications X X X X   Read reports about your outpatient visits, procedures, consult and ER notes X X X X   See your discharge summary, which is a recap of your hospital and/or ER visit that includes your diagnosis, lab results, and care plan. X X       How to register for [employer]:  1. Go to  [email or web address].  2. Click on the Sign Up Now box, which takes you to the New Member Sign Up page. You will need to provide the following information:  a. Enter your [employer] Access Code exactly as it appears at the top of this page. (You will not need to use this code after you’ve completed the sign-up process. If you do not sign up before the expiration date, you must request a new code.)   b. Enter your date of birth.   c. Enter your home email address.   d. Click Submit, and follow the next screen’s instructions.  3. Create a [employer] ID. This will  be your [employer] login ID and cannot be changed, so think of one that is secure and easy to remember.  4. Create a [employer] password. You can change your password at any time.  5. Enter your Password Reset Question and Answer. This can be used at a later time if you forget your password.   6. Enter your e-mail address. This allows you to receive e-mail notifications when new information is available in [employer].  7. Click Sign Up. You can now view your health information.    For assistance activating your [employer] account, call [phone]

## 2017-06-15 NOTE — ED NOTES
Provided patient with discharge paperwork and verbal education on prescription. Patient verbalized understanding of follow up and home care.

## 2017-06-15 NOTE — MR AVS SNAPSHOT
"        Kristin Balderrama   6/15/2017 8:30 AM   Office Visit   MRN: [id number]    Department:  [facility]   Dept Phone:  [phone]    Description:  Female : 1990   Provider:  Ruth Ann Renee PA-C           Reason for Visit     Vaginal Discharge x 2 days/ painful    Shortness of Breath x 2 days/ forgot O2      Allergies as of 6/15/2017     Allergen Noted Reactions    Cefdinir 2016   Shortness of Breath, Itching    Tolerated 17    Depakote [Divalproex Sodium] 2010   Unspecified    Muscle spasms/muscle pain and weakness      Abilify 2013   Unspecified    Headaches/muscle twitching      Amitriptyline 10/31/2013   Unspecified    Headaches      Amoxicillin 2010   Rash    Pt states \"I get a rash\".      Ciprofloxacin 2009   Rash    Pt states \"I get a rash\".      Clindamycin 2011   Nausea    Even with food      Doxycycline 08/15/2012   Vomiting, Nausea    RXN=unknown    Ees [Erythromycin] 2010   Vomiting, Nausea    Flagyl [Metronidazole Hcl] 2011   Unspecified    \"eye problems\"      Flomax [Tamsulosin Hydrochloride] 2009   Swelling    Metformin 2013   Unspecified    Increased lactic acid       Sulfa Drugs 2010   Hives, Rash    RXN=since childhood    Tape 08/15/2012   Rash    Tears skin off   coban with Tegaderm tape ok  RXN=ongoing    Vancomycin 07/10/2016   Itching    Pt becomes flushed in face and chest.   RXN=7/10/16    Cephalexin [Keflex] 2017   Rash    Pt states she gets a rash with this medication    Erythromycin 2017       Levofloxacin 10/27/2016   Unspecified    Leg muscle cramps    Metronidazole 2017       Valproic Acid 2017         You were diagnosed with     Infection of urinary catheter, initial encounter (CMS-[facility])   [[id number]]         Vital Signs     Blood Pressure Pulse Temperature Respirations Height Weight    124/70 mmHg 85 36.3 °C (97.3 °F) 16 1.651 m (5' 5\") 116.574 kg (257 lb)    Body Mass " Index Oxygen Saturation Last Menstrual Period Breastfeeding? Smoking Status       42.77 kg/m2 96% 06/04/2016 No Passive Smoke Exposure - Never Smoker       Basic Information     Date Of Birth Sex Race Ethnicity Preferred Language    1990 Female White Non- English      Your appointments     Jul 06, 2017  6:45 AM   Adult Draw/Collection with LAB [facility]   [facility] (--)    [address] [facility]  [place] [address]   [phone]            Jul 14, 2017 10:00 AM   Follow Up Med Management with Ronny Burnette M.D.   [facility] [address] [place] [address] ([facility])    [address] [facility]  [address]  [place] [address]   [phone]            Jul 17, 2017 10:00 AM   Individual Therapy with Blanca Brantley L.C.S.W.   [facility] ([facility]    [address] [facility] [address] [facility] [address] [facility]  [address]  [place] [address] [phone]            Jul 19, 2017  8:40 AM   FOLLOW UP with Torres Kumar M.D.   [facility] (--)    [address]  [place] [address] [phone]            Aug 11, 2017 10:00 AM   Follow Up Visit with Sandoval Fox M.D.   [facility] (--)    [address], [address]  [place] [address] [phone]           You will be receiving a confirmation call a few days before your appointment from our automated call confirmation system.            Aug 23, 2017  9:00 AM   Established Patient with Torres Brody M.D.   [facility] [address]    [address]  [address]  [place] [address] [phone]           You will be receiving a confirmation call a few days before your appointment from our automated call confirmation system.              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 Low  9/18/2010 - Present    Neurogenic bladder N31.9 Low  4/2/2011 - Present    Sinus tachycardia (Chronic) R00.0 High  10/31/2013 -  Present    Knee pain, right M25.561 Low  2/13/2014 - Present    Anxiety F41.9 Low  12/16/2014 - Present    Fatty liver disease, nonalcoholic K76.0 Low  1/19/2015 - Present    Progressive focal motor weakness R53.1 Low  6/28/2015 - Present    Acquired hypothyroidism E03.9 Low  11/23/2015 - Present    PCOS (polycystic ovarian syndrome) E28.2 Low  11/23/2015 - Present    H/O prior ablation treatment Z98.890   2/10/2016 - Present    Peripheral neuropathy (CMS-HCC) (Chronic) G62.9   3/6/2016 - Present    TONYA on CPAP G47.33, Z99.89 Low  3/7/2016 - Present    Morbidly obese (CMS-HCC) E66.01   3/7/2016 - Present    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   3/7/2016 - Present    Scoliosis M41.9   3/7/2016 - Present    GERD (gastroesophageal reflux disease) (Chronic) K21.9   3/7/2016 - Present    Vitamin D deficiency E55.9   5/21/2016 - Present    Chronic inflammatory arthritis (Chronic) M19.90 Medium  5/23/2016 - Present    Weakness of both lower extremities R29.898   6/22/2016 - Present    Elevated sedimentation rate R70.0   6/27/2016 - Present    Galactorrhea O92.6   7/22/2016 - Present    Psychosis, schizophrenia, simple (HCC) (Chronic) F20.89 Low Chronic 9/29/2016 - Present    Schizophrenia (CMS-HCC) F20.9 Low  10/27/2016 - Present    Paralysis (CMS-HCC) G83.9 High  10/27/2016 - Present    Chronic pain syndrome (Chronic) G89.4   10/27/2016 - Present    Bowel and bladder incontinence R32, R15.9   10/27/2016 - Present    Depression F32.9 Low  10/28/2016 - Present    HTN (hypertension) (Chronic) I10   11/1/2016 - Present    Hypovitaminosis D E55.9   11/29/2016 - Present    Leg weakness R29.898   1/4/2017 - Present    Weakness R53.1   1/22/2017 - Present    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 High  1/24/2017 - Present    Chronic suprapubic catheter (CMS-HCC) (Chronic) Z93.59   2/16/2017 - Present    Leg weakness, bilateral R29.898   2/22/2017 - Present    Weakness of right upper extremity R29.898   2/23/2017 -  Present    Chest pain R07.9   3/30/2017 - Present    On home oxygen therapy (Chronic) Z99.81   4/15/2017 - Present    Uncontrolled type 2 diabetes mellitus without complication, without long-term current use of insulin (CMS-[facility]) E11.65   4/26/2017 - Present    Dysuria R30.0   5/9/2017 - Present    Hashimoto's encephalopathy E06.3, G93.49   5/17/2017 - Present    Rash R21   6/9/2017 - Present      Health Maintenance        Date Due Completion Dates    IMM HEP A VACCINE (1 of 2 - Standard Series) 10/13/1990 ---    IMM VARICELLA (CHICKENPOX) VACCINE (1 of 2 - 2 Dose Adolescent Series) 10/13/2002 ---    URINE ACR / MICROALBUMIN 10/13/2007 ---    A1C SCREENING 10/6/2017 4/6/2017, 12/7/2016, 10/14/2016, 3/9/2016, 9/5/2014    FASTING LIPID PROFILE 3/7/2018 3/7/2017, 2/24/2017, 12/7/2016, 10/28/2016, 10/14/2016, 3/21/2014    SERUM CREATININE 5/18/2018 5/18/2017, 5/17/2017, 5/5/2017, 4/14/2017, 4/13/2017, 4/12/2017, 4/5/2017, 3/27/2017, 3/18/2017, 3/17/2017, 3/16/2017, 3/11/2017, 3/10/2017, 3/9/2017, 3/7/2017, 2/25/2017, 2/24/2017, 2/23/2017, 2/22/2017, 1/30/2017, 1/27/2017, 1/25/2017, 1/22/2017, 1/22/2017, 1/18/2017, 1/18/2017, 12/7/2016, 12/6/2016, 11/4/2016, 11/3/2016, 11/2/2016, 11/1/2016, 10/28/2016, 10/27/2016, 10/14/2016, 10/4/2016, 10/3/2016, 9/29/2016, 8/16/2016, 7/28/2016, 7/28/2016, 7/10/2016, 6/25/2016, 6/23/2016, 6/22/2016, 6/7/2016, 5/18/2016, 4/19/2016, 4/3/2016, 3/30/2016, 3/29/2016, 3/28/2016, 3/14/2016, 3/10/2016, 3/9/2016, 3/7/2016, 3/6/2016, 2/15/2016, 2/12/2016, 1/29/2016, 1/28/2016, 1/26/2016, 11/28/2015, 11/27/2015, 11/23/2015, 11/22/2015, 7/9/2015, 7/8/2015, 6/27/2015, 4/14/2015, 3/23/2015, 2/18/2015, 10/27/2014, 9/5/2014, 3/21/2014, 12/24/2013, 1/12/2013, 8/24/2012, 8/23/2012, 8/22/2012, 8/15/2012, 4/21/2012, 4/20/2012, 4/19/2012, 9/17/2011, 4/3/2011, 4/2/2011, 3/31/2011, 9/20/2010, 9/19/2010, 9/18/2010, 8/28/2010, 7/20/2010, 1/30/2007    RETINAL SCREENING 5/23/2018 5/23/2017    DIABETES MONOFILAMENT  "/ LE EXAM 5/23/2018 5/23/2017    PAP SMEAR 7/22/2019 7/22/2016 (Postponed), 2/19/2013 (N/S)    Override on 7/22/2016: Postponed (per pt was told could \"skip\" 2016)    Override on 2/19/2013: (N/S) ([place])    IMM DTaP/Tdap/Td Vaccine (7 - Td) 8/6/2022 8/6/2012, 9/18/2010, 3/3/1994, 5/17/1991, 5/10/1990, 3/23/1990, 1990    COLONOSCOPY 9/25/2023 9/25/2013            Current Immunizations     Dtap Vaccine 3/3/1994, 5/17/1991, 5/10/1990, 3/23/1990, 1990    HIB Vaccine(PEDVAX) 1/11/1991    HPV Quadrivalent Vaccine (GARDASIL) 10/27/2011, 5/27/2011, 3/1/2011    Hepatitis B Vaccine Non-Recombivax (Ped/Adol) 1/28/2004, 10/28/2003, 10/29/1999    Influenza TIV (IM) 9/5/2013, 12/7/2011, 11/7/2011    Influenza Vaccine Adult HD 10/5/2016    MMR Vaccine 3/3/1994, 1/11/1991    OPV - Historical Data 3/3/1994, 5/17/1991, 5/10/1990, 3/23/1990, 1990    Pneumococcal Vaccine (PCV7) Historical Data 11/8/2015    Pneumococcal polysaccharide vaccine (PPSV-23) 1/23/2017  5:35 PM    TD Vaccine 9/18/2010  4:45 PM    Tdap Vaccine 8/6/2012      Below and/or attached are the medications your provider expects you to take. Review all of your home medications and newly ordered medications with your provider and/or pharmacist. Follow medication instructions as directed by your provider and/or pharmacist. Please keep your medication list with you and share with your provider. Update the information when medications are discontinued, doses are changed, or new medications (including over-the-counter products) are added; and carry medication information at all times in the event of emergency situations     Allergies:  CEFDINIR - Shortness of Breath,Itching     DEPAKOTE - Unspecified     ABILIFY - Unspecified     AMITRIPTYLINE - Unspecified     AMOXICILLIN - Rash     CIPROFLOXACIN - Rash     CLINDAMYCIN - Nausea     DOXYCYCLINE - Vomiting,Nausea     EES - Vomiting,Nausea     FLAGYL - Unspecified     FLOMAX - Swelling     METFORMIN - " Unspecified     SULFA DRUGS - Hives,Rash     TAPE - Rash     VANCOMYCIN - Itching     CEPHALEXIN - Rash     ERYTHROMYCIN - (reactions not documented)     LEVOFLOXACIN - Unspecified     METRONIDAZOLE - (reactions not documented)     VALPROIC ACID - (reactions not documented)               Medications  Valid as of: Irene 15, 2017 -  9:14 AM    Generic Name Brand Name Tablet Size Instructions for use    Albuterol Sulfate (Aero Soln) albuterol 108 (90 BASE) MCG/ACT Inhale 2 Puffs by mouth every 6 hours as needed for Shortness of Breath.        Aspirin (Tablet Delayed Response) ECOTRIN 81 MG Take 1 Tab by mouth every day.        Baclofen (Tab) LIORESAL 10 MG Take 1 Tab by mouth 3 times a day.        Cranberry   Take 100,400 mg by mouth 2 times a day.        Cyanocobalamin (Tab) vitamin b12 500 MCG Take 1,000 mcg by mouth 2 times a day.        Doxycycline Hyclate (Tab) VIBRAMYCIN 100 MG Take 1 Tab by mouth 2 times a day.        Ergocalciferol (Cap) DRISDOL 43037 UNITS Take 50,000 Units by mouth every Friday.        FentaNYL (PATCH 72 HR) DURAGESIC 25 MCG/HR Apply 1 Patch to skin as directed every 72 hours.        Fluconazole (Tab) DIFLUCAN 150 MG Take 1 Tab by mouth every day. Take on day day one and day 3.        FLUoxetine HCl (Cap) PROZAC 10 MG TAKE ONE CAPSULE BY MOUTH ONCE A DAY        Ivabradine HCl (Tab) CORLANOR 5 MG Take 1 Tab by mouth 2 times a day, with meals.        Lactulose (Solution) lactulose 10 GM/15ML Take 10 g by mouth 2 times a day as needed.        Levothyroxine Sodium (Tab) SYNTHROID 75 MCG Take 75 mcg by mouth every morning.        Liraglutide (Solution Pen-injector) VICTOZA 18 MG/3ML Inject 0.3 mL as instructed every day.        Melatonin (Tab) Melatonin 5 MG Take 10 mg by mouth every bedtime.        Mirabegron (TABLET SR 24 HR) Mirabegron ER 25 MG Take 1 Tab by mouth every day.        Nitrofurantoin Monohyd Macro (Cap) MACROBID 100 MG Take 1 Cap by mouth 2 times a day for 10 days.        Omeprazole  (CAPSULE DELAYED RELEASE) PRILOSEC 20 MG Take 20 mg by mouth 2 times a day.        Oxycodone-Acetaminophen (Tab) PERCOCET-10  MG Take 2 Tabs by mouth every 6 hours as needed for Severe Pain.        Promethazine HCl (Tab) PHENERGAN 25 MG Take 1 Tab by mouth every 6 hours as needed for Nausea/Vomiting.        RaNITidine HCl (Tab) ZANTAC 150 MG Take 150 mg by mouth 2 times a day.        SITagliptin Phosphate (Tab) JANUVIA 100 MG Take 1 Tab by mouth every day.        Sodium Bicarbonate (Tab) sodium bicarbonate 325 MG Take 2 Tabs by mouth 3 times a day.        Ziprasidone HCl (Cap) GEODON 80 MG Take 160 mg by mouth every evening. Takes this at 1700 daily        .                 Medicines prescribed today were sent to:     [id number] - [place], [place] - [address] [address]    Phone: [phone] Fax: [phone]    Open 24 Hours?: No      Medication refill instructions:       If your prescription bottle indicates you have medication refills left, it is not necessary to call your provider’s office. Please contact your pharmacy and they will refill your medication.    If your prescription bottle indicates you do not have any refills left, you may request refills at any time through one of the following ways: The online [email or web address] system (except Urgent Care), by calling your provider’s office, or by asking your pharmacy to contact your provider’s office with a refill request. Medication refills are processed only during regular business hours and may not be available until the next business day. Your provider may request additional information or to have a follow-up visit with you prior to refilling your medication.   *Please Note: Medication refills are assigned a new Rx number when refilled electronically. Your pharmacy may indicate that no refills were authorized even though a new prescription for the same medication is available at the pharmacy. Please request the medicine  by name with the pharmacy before contacting your provider for a refill.        Other Notes About Your Plan     DME:  [facility] / ph [phone] / fax [phone]              MyChart Access Code: Activation code not generated  Current MyChart Status: Active

## 2017-06-20 ENCOUNTER — HOSPITAL ENCOUNTER (EMERGENCY)
Facility: MEDICAL CENTER | Age: 27
End: 2017-06-20
Payer: MEDICARE

## 2017-06-20 ENCOUNTER — OFFICE VISIT (OUTPATIENT)
Dept: URGENT CARE | Facility: CLINIC | Age: 27
End: 2017-06-20
Payer: MEDICARE

## 2017-06-20 VITALS
DIASTOLIC BLOOD PRESSURE: 81 MMHG | WEIGHT: 260.8 LBS | RESPIRATION RATE: 16 BRPM | HEART RATE: 104 BPM | OXYGEN SATURATION: 95 % | BODY MASS INDEX: 46.21 KG/M2 | HEIGHT: 63 IN | SYSTOLIC BLOOD PRESSURE: 140 MMHG | TEMPERATURE: 97.6 F

## 2017-06-20 VITALS
WEIGHT: 257 LBS | OXYGEN SATURATION: 94 % | HEART RATE: 107 BPM | TEMPERATURE: 99 F | BODY MASS INDEX: 42.82 KG/M2 | DIASTOLIC BLOOD PRESSURE: 82 MMHG | SYSTOLIC BLOOD PRESSURE: 130 MMHG | RESPIRATION RATE: 16 BRPM | HEIGHT: 65 IN

## 2017-06-20 DIAGNOSIS — R31.9 HEMATURIA: ICD-10-CM

## 2017-06-20 PROCEDURE — 99213 OFFICE O/P EST LOW 20 MIN: CPT | Performed by: NURSE PRACTITIONER

## 2017-06-20 PROCEDURE — 302449 STATCHG TRIAGE ONLY (STATISTIC)

## 2017-06-20 ASSESSMENT — PAIN SCALES - GENERAL: PAINLEVEL_OUTOF10: 8

## 2017-06-20 ASSESSMENT — ENCOUNTER SYMPTOMS
ABDOMINAL PAIN: 0
FLANK PAIN: 0
CHILLS: 0
FEVER: 0

## 2017-06-20 NOTE — MR AVS SNAPSHOT
"        Kristin Armstrong Tacos   2017 4:15 PM   Office Visit   MRN: [id number]    Department:  [facility]   Dept Phone:  [phone]    Description:  Female : 1990   Provider:  VANIA Gaitan           Reason for Visit     Blood in Urine for 2 days/ painful bladder       Allergies as of 2017     Allergen Noted Reactions    Cefdinir 2016   Shortness of Breath, Itching    Tolerated 17    Depakote [Divalproex Sodium] 2010   Unspecified    Muscle spasms/muscle pain and weakness      Abilify 2013   Unspecified    Headaches/muscle twitching      Amitriptyline 10/31/2013   Unspecified    Headaches      Amoxicillin 2010   Rash    Pt states \"I get a rash\".      Ciprofloxacin 2009   Rash    Pt states \"I get a rash\".      Clindamycin 2011   Nausea    Even with food      Doxycycline 08/15/2012   Vomiting, Nausea    RXN=unknown    Ees [Erythromycin] 2010   Vomiting, Nausea    Flagyl [Metronidazole Hcl] 2011   Unspecified    \"eye problems\"      Flomax [Tamsulosin Hydrochloride] 2009   Swelling    Metformin 2013   Unspecified    Increased lactic acid       Sulfa Drugs 2010   Hives, Rash    RXN=since childhood    Tape 08/15/2012   Rash    Tears skin off   coban with Tegaderm tape ok  RXN=ongoing    Vancomycin 07/10/2016   Itching    Pt becomes flushed in face and chest.   RXN=7/10/16    Cephalexin [Keflex] 2017   Rash    Pt states she gets a rash with this medication    Erythromycin 2017       Levofloxacin 10/27/2016   Unspecified    Leg muscle cramps    Metronidazole 2017       Valproic Acid 2017         Vital Signs     Blood Pressure Pulse Temperature Respirations Height Weight    130/82 mmHg 107 37.2 °C (99 °F) 16 1.651 m (5' 5\") 116.574 kg (257 lb)    Body Mass Index Oxygen Saturation Smoking Status             42.77 kg/m2 94% Passive Smoke Exposure - Never Smoker         Basic Information     Date " Of Birth Sex Race Ethnicity Preferred Language    1990 Female White Non- English      Your appointments     Jul 06, 2017  6:45 AM   Adult Draw/Collection with LAB TI[facility]   [facility] - [facility] (--)    [address] [facility]  [place] [address]   [phone]            Jul 14, 2017 10:00 AM   Follow Up Med Management with Ronny Burnette M.D.   [facility] [address] [place] [address] ([facility])    [address] [facility]  [address]  [place] [address]   [phone]            Jul 17, 2017 10:00 AM   Individual Therapy with Blanca Brantley L.C.S.W.   [facility]IOR[facility] [place] [place] ([facility])    [address]  [address]  [place] [id number] [phone]            Jul 19, 2017  8:40 AM   FOLLOW UP with Torres Kumar M.D.   [facility] (--)    [address]  [place] [id number] [phone]            Aug 11, 2017 10:00 AM   Follow Up Visit with Sandoval Fox M.D.   [facility] (--)    [address], [address]  [place] [id number] [phone]           You will be receiving a confirmation call a few days before your appointment from our automated call confirmation system.            Aug 23, 2017  9:00 AM   Established Patient with Torres Brody M.D.   [facility] [address]    [address]  [address]  [place] [id number] [phone]           You will be receiving a confirmation call a few days before your appointment from our automated call confirmation system.              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 Low  9/18/2010 - Present    Neurogenic bladder N31.9 Low  4/2/2011 - Present    Sinus tachycardia (Chronic) R00.0 High  10/31/2013 - Present    Knee pain, right M25.561 Low  2/13/2014 - Present    Anxiety F41.9 Low  12/16/2014 - Present    Fatty liver disease, nonalcoholic K76.0 Low  1/19/2015 - Present     Progressive focal motor weakness R53.1 Low  6/28/2015 - Present    Acquired hypothyroidism E03.9 Low  11/23/2015 - Present    PCOS (polycystic ovarian syndrome) E28.2 Low  11/23/2015 - Present    H/O prior ablation treatment Z98.890   2/10/2016 - Present    Peripheral neuropathy (CMS-HCC) (Chronic) G62.9   3/6/2016 - Present    TONYA on CPAP G47.33, Z99.89 Low  3/7/2016 - Present    Morbidly obese (CMS-HCC) E66.01   3/7/2016 - Present    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   3/7/2016 - Present    Scoliosis M41.9   3/7/2016 - Present    GERD (gastroesophageal reflux disease) (Chronic) K21.9   3/7/2016 - Present    Vitamin D deficiency E55.9   5/21/2016 - Present    Chronic inflammatory arthritis (Chronic) M19.90 Medium  5/23/2016 - Present    Weakness of both lower extremities R29.898   6/22/2016 - Present    Elevated sedimentation rate R70.0   6/27/2016 - Present    Galactorrhea O92.6   7/22/2016 - Present    Psychosis, schizophrenia, simple (HCC) (Chronic) F20.89 Low Chronic 9/29/2016 - Present    Schizophrenia (CMS-HCC) F20.9 Low  10/27/2016 - Present    Paralysis (CMS-HCC) G83.9 High  10/27/2016 - Present    Chronic pain syndrome (Chronic) G89.4   10/27/2016 - Present    Bowel and bladder incontinence R32, R15.9   10/27/2016 - Present    Depression F32.9 Low  10/28/2016 - Present    HTN (hypertension) (Chronic) I10   11/1/2016 - Present    Hypovitaminosis D E55.9   11/29/2016 - Present    Leg weakness R29.898   1/4/2017 - Present    Weakness R53.1   1/22/2017 - Present    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 High  1/24/2017 - Present    Chronic suprapubic catheter (CMS-HCC) (Chronic) Z93.59   2/16/2017 - Present    Leg weakness, bilateral R29.898   2/22/2017 - Present    Weakness of right upper extremity R29.898   2/23/2017 - Present    Chest pain R07.9   3/30/2017 - Present    On home oxygen therapy (Chronic) Z99.81   4/15/2017 - Present    Uncontrolled type 2 diabetes mellitus without complication,  without long-term current use of insulin (CMS-[facility]) E11.65   4/26/2017 - Present    Dysuria R30.0   5/9/2017 - Present    Hashimoto's encephalopathy E06.3, G93.49   5/17/2017 - Present    Rash R21   6/9/2017 - Present      Health Maintenance        Date Due Completion Dates    IMM HEP A VACCINE (1 of 2 - Standard Series) 10/13/1990 ---    IMM VARICELLA (CHICKENPOX) VACCINE (1 of 2 - 2 Dose Adolescent Series) 10/13/2002 ---    A1C SCREENING 10/6/2017 4/6/2017, 12/7/2016, 10/14/2016, 3/9/2016, 9/5/2014    FASTING LIPID PROFILE 3/7/2018 3/7/2017, 2/24/2017, 12/7/2016, 10/28/2016, 10/14/2016, 3/21/2014    URINE ACR / MICROALBUMIN 5/5/2018 5/5/2017, 3/7/2017, 12/7/2016, 10/14/2016, 7/28/2016    SERUM CREATININE 5/18/2018 5/18/2017, 5/17/2017, 5/5/2017, 4/14/2017, 4/13/2017, 4/12/2017, 4/5/2017, 3/27/2017, 3/18/2017, 3/17/2017, 3/16/2017, 3/11/2017, 3/10/2017, 3/9/2017, 3/7/2017, 2/25/2017, 2/24/2017, 2/23/2017, 2/22/2017, 1/30/2017, 1/27/2017, 1/25/2017, 1/22/2017, 1/22/2017, 1/18/2017, 1/18/2017, 12/7/2016, 12/6/2016, 11/4/2016, 11/3/2016, 11/2/2016, 11/1/2016, 10/28/2016, 10/27/2016, 10/14/2016, 10/4/2016, 10/3/2016, 9/29/2016, 8/16/2016, 7/28/2016, 7/28/2016, 7/10/2016, 6/25/2016, 6/23/2016, 6/22/2016, 6/7/2016, 5/18/2016, 4/19/2016, 4/3/2016, 3/30/2016, 3/29/2016, 3/28/2016, 3/14/2016, 3/10/2016, 3/9/2016, 3/7/2016, 3/6/2016, 2/15/2016, 2/12/2016, 1/29/2016, 1/28/2016, 1/26/2016, 11/28/2015, 11/27/2015, 11/23/2015, 11/22/2015, 7/9/2015, 7/8/2015, 6/27/2015, 4/14/2015, 3/23/2015, 2/18/2015, 10/27/2014, 9/5/2014, 3/21/2014, 12/24/2013, 1/12/2013, 8/24/2012, 8/23/2012, 8/22/2012, 8/15/2012, 4/21/2012, 4/20/2012, 4/19/2012, 9/17/2011, 4/3/2011, 4/2/2011, 3/31/2011, 9/20/2010, 9/19/2010, 9/18/2010, 8/28/2010, 7/20/2010, 1/30/2007    RETINAL SCREENING 5/23/2018 5/23/2017    DIABETES MONOFILAMENT / LE EXAM 5/23/2018 5/23/2017    PAP SMEAR 7/22/2019 7/22/2016 (Postponed), 2/19/2013 (N/S)    Override on 7/22/2016: Postponed  "(per pt was told could \"skip\" 2016)    Override on 2/19/2013: (N/S) ([place])    IMM DTaP/Tdap/Td Vaccine (7 - Td) 8/6/2022 8/6/2012, 9/18/2010, 3/3/1994, 5/17/1991, 5/10/1990, 3/23/1990, 1990    COLONOSCOPY 9/25/2023 9/25/2013            Current Immunizations     Dtap Vaccine 3/3/1994, 5/17/1991, 5/10/1990, 3/23/1990, 1990    HIB Vaccine(PEDVAX) 1/11/1991    HPV Quadrivalent Vaccine (GARDASIL) 10/27/2011, 5/27/2011, 3/1/2011    Hepatitis B Vaccine Non-Recombivax (Ped/Adol) 1/28/2004, 10/28/2003, 10/29/1999    Influenza TIV (IM) 9/5/2013, 12/7/2011, 11/7/2011    Influenza Vaccine Adult HD 10/5/2016    MMR Vaccine 3/3/1994, 1/11/1991    OPV - Historical Data 3/3/1994, 5/17/1991, 5/10/1990, 3/23/1990, 1990    Pneumococcal Vaccine (PCV7) Historical Data 11/8/2015    Pneumococcal polysaccharide vaccine (PPSV-23) 1/23/2017  5:35 PM    TD Vaccine 9/18/2010  4:45 PM    Tdap Vaccine 8/6/2012      Below and/or attached are the medications your provider expects you to take. Review all of your home medications and newly ordered medications with your provider and/or pharmacist. Follow medication instructions as directed by your provider and/or pharmacist. Please keep your medication list with you and share with your provider. Update the information when medications are discontinued, doses are changed, or new medications (including over-the-counter products) are added; and carry medication information at all times in the event of emergency situations     Allergies:  CEFDINIR - Shortness of Breath,Itching     DEPAKOTE - Unspecified     ABILIFY - Unspecified     AMITRIPTYLINE - Unspecified     AMOXICILLIN - Rash     CIPROFLOXACIN - Rash     CLINDAMYCIN - Nausea     DOXYCYCLINE - Vomiting,Nausea     EES - Vomiting,Nausea     FLAGYL - Unspecified     FLOMAX - Swelling     METFORMIN - Unspecified     SULFA DRUGS - Hives,Rash     TAPE - Rash     VANCOMYCIN - Itching     CEPHALEXIN - Rash     ERYTHROMYCIN - (reactions " not documented)     LEVOFLOXACIN - Unspecified     METRONIDAZOLE - (reactions not documented)     VALPROIC ACID - (reactions not documented)               Medications  Valid as of: June 20, 2017 -  5:08 PM    Generic Name Brand Name Tablet Size Instructions for use    Albuterol Sulfate (Aero Soln) albuterol 108 (90 BASE) MCG/ACT Inhale 2 Puffs by mouth every 6 hours as needed for Shortness of Breath.        Aspirin (Tablet Delayed Response) ECOTRIN 81 MG Take 1 Tab by mouth every day.        Baclofen (Tab) LIORESAL 10 MG Take 1 Tab by mouth 3 times a day.        Cranberry   Take 100,400 mg by mouth 2 times a day.        Cyanocobalamin (Tab) vitamin b12 500 MCG Take 1,000 mcg by mouth 2 times a day.        Doxycycline Hyclate (Tab) VIBRAMYCIN 100 MG Take 1 Tab by mouth 2 times a day.        Ergocalciferol (Cap) DRISDOL 83484 UNITS Take 50,000 Units by mouth every Friday.        FentaNYL (PATCH 72 HR) DURAGESIC 25 MCG/HR Apply 1 Patch to skin as directed every 72 hours.        Fluconazole (Tab) DIFLUCAN 150 MG Take 1 Tab by mouth every day. Take on day day one and day 3.        FLUoxetine HCl (Cap) PROZAC 10 MG TAKE ONE CAPSULE BY MOUTH ONCE A DAY        Ivabradine HCl (Tab) CORLANOR 5 MG Take 1 Tab by mouth 2 times a day, with meals.        Lactulose (Solution) lactulose 10 GM/15ML Take 10 g by mouth 2 times a day as needed.        Levothyroxine Sodium (Tab) SYNTHROID 75 MCG Take 75 mcg by mouth every morning.        Liraglutide (Solution Pen-injector) VICTOZA 18 MG/3ML Inject 0.3 mL as instructed every day.        Melatonin (Tab) Melatonin 5 MG Take 10 mg by mouth every bedtime.        Mirabegron (TABLET SR 24 HR) Mirabegron ER 25 MG Take 1 Tab by mouth every day.        Nitrofurantoin Macrocrystal (Cap) MACRODANTIN 100 MG Take 1 Cap by mouth 4 times a day for 7 days.        Omeprazole (CAPSULE DELAYED RELEASE) PRILOSEC 20 MG Take 20 mg by mouth 2 times a day.        Oxycodone-Acetaminophen (Tab) PERCOCET-10   MG Take 2 Tabs by mouth every 6 hours as needed for Severe Pain.        Promethazine HCl (Tab) PHENERGAN 25 MG Take 1 Tab by mouth every 6 hours as needed for Nausea/Vomiting.        RaNITidine HCl (Tab) ZANTAC 150 MG Take 150 mg by mouth 2 times a day.        SITagliptin Phosphate (Tab) JANUVIA 100 MG Take 1 Tab by mouth every day.        Sodium Bicarbonate (Tab) sodium bicarbonate 325 MG Take 2 Tabs by mouth 3 times a day.        Ziprasidone HCl (Cap) GEODON 80 MG Take 160 mg by mouth every evening. Takes this at 1700 daily        .                 Medicines prescribed today were sent to:     [id number] - [place], [place] - [address]    [address] [place] [address]    Phone: [phone] Fax: [phone]    Open 24 Hours?: No      Medication refill instructions:       If your prescription bottle indicates you have medication refills left, it is not necessary to call your provider’s office. Please contact your pharmacy and they will refill your medication.    If your prescription bottle indicates you do not have any refills left, you may request refills at any time through one of the following ways: The online [employer] system (except Urgent Care), by calling your provider’s office, or by asking your pharmacy to contact your provider’s office with a refill request. Medication refills are processed only during regular business hours and may not be available until the next business day. Your provider may request additional information or to have a follow-up visit with you prior to refilling your medication.   *Please Note: Medication refills are assigned a new Rx number when refilled electronically. Your pharmacy may indicate that no refills were authorized even though a new prescription for the same medication is available at the pharmacy. Please request the medicine by name with the pharmacy before contacting your provider for a refill.        Other Notes About Your Plan     DME:  [facility] /   [phone] / fax [phone]              MyChart Access Code: Activation code not generated  Current MyChart Status: Active

## 2017-06-21 ENCOUNTER — HOSPITAL ENCOUNTER (EMERGENCY)
Facility: MEDICAL CENTER | Age: 27
End: 2017-06-21
Attending: EMERGENCY MEDICINE
Payer: MEDICARE

## 2017-06-21 ENCOUNTER — APPOINTMENT (OUTPATIENT)
Dept: RADIOLOGY | Facility: MEDICAL CENTER | Age: 27
End: 2017-06-21
Attending: EMERGENCY MEDICINE
Payer: MEDICARE

## 2017-06-21 VITALS
SYSTOLIC BLOOD PRESSURE: 146 MMHG | HEIGHT: 63 IN | DIASTOLIC BLOOD PRESSURE: 87 MMHG | HEART RATE: 81 BPM | OXYGEN SATURATION: 97 % | RESPIRATION RATE: 16 BRPM | BODY MASS INDEX: 46.09 KG/M2 | TEMPERATURE: 97.3 F | WEIGHT: 260.14 LBS

## 2017-06-21 DIAGNOSIS — T83.091A OBSTRUCTED FOLEY CATHETER, INITIAL ENCOUNTER (HCC): ICD-10-CM

## 2017-06-21 DIAGNOSIS — R33.9 URINARY RETENTION: ICD-10-CM

## 2017-06-21 LAB
ANION GAP SERPL CALC-SCNC: 12 MMOL/L (ref 0–11.9)
APPEARANCE UR: CLEAR
BACTERIA #/AREA URNS HPF: ABNORMAL /HPF
BILIRUB UR QL STRIP.AUTO: NEGATIVE
BUN SERPL-MCNC: 17 MG/DL (ref 8–22)
CALCIUM SERPL-MCNC: 9.9 MG/DL (ref 8.5–10.5)
CHLORIDE SERPL-SCNC: 107 MMOL/L (ref 96–112)
CO2 SERPL-SCNC: 21 MMOL/L (ref 20–33)
COLOR UR: ABNORMAL
CREAT SERPL-MCNC: 0.76 MG/DL (ref 0.5–1.4)
CULTURE IF INDICATED INDCX: NO UA CULTURE
ERYTHROCYTE [DISTWIDTH] IN BLOOD BY AUTOMATED COUNT: 44.2 FL (ref 35.9–50)
GFR SERPL CREATININE-BSD FRML MDRD: >60 ML/MIN/1.73 M 2
GLUCOSE SERPL-MCNC: 116 MG/DL (ref 65–99)
GLUCOSE UR STRIP.AUTO-MCNC: NEGATIVE MG/DL
HCT VFR BLD AUTO: 38.3 % (ref 37–47)
HGB BLD-MCNC: 12.8 G/DL (ref 12–16)
KETONES UR STRIP.AUTO-MCNC: NEGATIVE MG/DL
LEUKOCYTE ESTERASE UR QL STRIP.AUTO: NEGATIVE
MCH RBC QN AUTO: 30.4 PG (ref 27–33)
MCHC RBC AUTO-ENTMCNC: 33.4 G/DL (ref 33.6–35)
MCV RBC AUTO: 91 FL (ref 81.4–97.8)
MICRO URNS: ABNORMAL
MUCOUS THREADS #/AREA URNS HPF: ABNORMAL /HPF
NITRITE UR QL STRIP.AUTO: NEGATIVE
PH UR STRIP.AUTO: 5 [PH]
PLATELET # BLD AUTO: 210 K/UL (ref 164–446)
PMV BLD AUTO: 11.3 FL (ref 9–12.9)
POTASSIUM SERPL-SCNC: 4 MMOL/L (ref 3.6–5.5)
PROT UR QL STRIP: NEGATIVE MG/DL
RBC # BLD AUTO: 4.21 M/UL (ref 4.2–5.4)
RBC # URNS HPF: ABNORMAL /HPF
RBC UR QL AUTO: ABNORMAL
SODIUM SERPL-SCNC: 140 MMOL/L (ref 135–145)
SP GR UR STRIP.AUTO: 1.01
WBC # BLD AUTO: 6.6 K/UL (ref 4.8–10.8)
WBC #/AREA URNS HPF: ABNORMAL /HPF

## 2017-06-21 PROCEDURE — 85027 COMPLETE CBC AUTOMATED: CPT

## 2017-06-21 PROCEDURE — 51798 US URINE CAPACITY MEASURE: CPT

## 2017-06-21 PROCEDURE — 73502 X-RAY EXAM HIP UNI 2-3 VIEWS: CPT | Mod: LT

## 2017-06-21 PROCEDURE — 80048 BASIC METABOLIC PNL TOTAL CA: CPT

## 2017-06-21 PROCEDURE — 73562 X-RAY EXAM OF KNEE 3: CPT | Mod: LT

## 2017-06-21 PROCEDURE — 81001 URINALYSIS AUTO W/SCOPE: CPT

## 2017-06-21 PROCEDURE — 700111 HCHG RX REV CODE 636 W/ 250 OVERRIDE (IP): Performed by: EMERGENCY MEDICINE

## 2017-06-21 PROCEDURE — 96372 THER/PROPH/DIAG INJ SC/IM: CPT

## 2017-06-21 PROCEDURE — 99284 EMERGENCY DEPT VISIT MOD MDM: CPT

## 2017-06-21 RX ORDER — KETOROLAC TROMETHAMINE 30 MG/ML
30 INJECTION, SOLUTION INTRAMUSCULAR; INTRAVENOUS ONCE
Status: COMPLETED | OUTPATIENT
Start: 2017-06-21 | End: 2017-06-21

## 2017-06-21 RX ADMIN — KETOROLAC TROMETHAMINE 30 MG: 30 INJECTION, SOLUTION INTRAMUSCULAR at 03:30

## 2017-06-21 ASSESSMENT — PAIN SCALES - GENERAL
PAINLEVEL_OUTOF10: 7
PAINLEVEL_OUTOF10: 1

## 2017-06-21 ASSESSMENT — LIFESTYLE VARIABLES: DO YOU DRINK ALCOHOL: NO

## 2017-06-21 NOTE — PROGRESS NOTES
"Subjective:      Kristin Balderrama is a 26 y.o. female who presents with Blood in Urine            UTI  This is a new problem. Episode onset: She presents today with excessive blood in her urine. She currently has a supra pubic catheter in place r/t incontinence. Apparently saw Urology yesterday and pt states they were not concerned of the blood in her urine. The problem has been gradually worsening (She reports the blood is sometimes present in her urine but then usually resolves. It has now been present for two days. Currently on antibiotic treatment for a UTI, treated in ED 5 days ago). Pertinent negatives include no abdominal pain, chills or fever. Nothing aggravates the symptoms. Treatments tried: antibiotics. The treatment provided no relief.       Review of Systems   Constitutional: Negative for fever and chills.   Gastrointestinal: Negative for abdominal pain.   Genitourinary: Positive for hematuria. Negative for flank pain.   All other systems reviewed and are negative.    Past Medical History   Diagnosis Date   • Scoliosis    • Multiple personality disorder    • Chronic UTI 9/18/2010   • Heart burn    • Pain 08-15-12     back, 7/10   • History of falling    • Sinus tachycardia 10/31/2013   • Urinary incontinence    • Hypertension    • Disorder of thyroid    • Obesity    • Pneumonia 2012   • ASTHMA      when around smoke   • Breath shortness      with tachycardia   • Anginal syndrome      random chest pain especially with tachycardia   • Psychosis    • Arthritis      osteo   • PCOS (polycystic ovarian syndrome)    • Gynecological disorder      PCOS   • Renal disorder      \"kidney disease, stage 1\" nephrologist, Dr. Vallejo   • Arrhythmia      \"sinus tachycardia\", cariologist, Dr. 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   • Sleep apnea      CPAP \"pulmonary doctor took me off mid year 2016\"   • Mitochondrial " "disease    • Fall       Past Surgical History   Procedure Laterality Date   • Neuro dest facet l/s w/ig sngl  4/21/2015     Performed by Reza Tabor at SURGERY [facility]   • Recovery  1/27/2016     Procedure: CATH LAB EP STUDY WITH SINUS NODE MODIFICATION ABHINAV;  Surgeon: Recoveryoncelsa Surgery;  Location: SURGERY PRE-POST PROC UNIT [facility];  Service:    • Katie by laparoscopy  8/29/2010     Performed by SHAYY JOHNS at SURGERY [facility]   • Lumbar fusion anterior  8/21/2012     Performed by SUSIE SAWANT at SURGERY [facility]   • Other cardiac surgery  1/2016     cardiac ablation   • Tonsillectomy       tonsillectomy   • Bowel stimulator insertion  7/13/2016     Procedure: BOWEL STIMULATOR INSERTION FOR PERMANENT INTERSTIM SACRAL IMPLANT;  Surgeon: Joe Noyola M.D.;  Location: SURGERY [facility];  Service:    • Gastroscopy with balloon dilatation N/A 1/4/2017     Procedure: GASTROSCOPY WITH DILATATION;  Surgeon: Torres Vargas M.D.;  Location: SURGERY [facility];  Service:    • Muscle biopsy Right 1/26/2017     Procedure: MUSCLE BIOPSY - THIGH;  Surgeon: Isidro Vigil M.D.;  Location: SURGERY [facility];  Service:    • Other abdominal surgery        Social History     Social History   • Marital Status: Single     Spouse Name: N/A   • Number of Children: N/A   • Years of Education: N/A     Occupational History   • Not on file.     Social History Main Topics   • Smoking status: Passive Smoke Exposure - Never Smoker     Types: Cigarettes   • Smokeless tobacco: Never Used   • Alcohol Use: No   • Drug Use: No   • Sexual Activity: Not Currently     Birth Control/ Protection: Pill     Other Topics Concern   • Not on file     Social History Narrative    ** Merged History Encounter **               Objective:     /82 mmHg  Pulse 107  Temp(Src) 37.2 °C (99 °F)  Resp 16  Ht 1.651 m (5' 5\")  Wt 116.574 kg (257 lb)  BMI 42.77 kg/m2  SpO2 94%     Physical Exam   Constitutional: " She is oriented to person, place, and time. Vital signs are normal. She appears well-developed and well-nourished.   HENT:   Head: Normocephalic and atraumatic.   Eyes: EOM are normal. Pupils are equal, round, and reactive to light.   Neck: Normal range of motion.   Cardiovascular: Normal rate and regular rhythm.    Pulmonary/Chest: Effort normal.   Abdominal: There is no CVA tenderness.   Supra pubic catheter in place, drainage bag attached  Obvious blood noted in catheter line and collection bag  Actively draining   Musculoskeletal: Normal range of motion.   Neurological: She is alert and oriented to person, place, and time.   Skin: Skin is warm and dry.   Psychiatric: She has a normal mood and affect. Her speech is normal and behavior is normal. Thought content normal.   Vitals reviewed.              Assessment/Plan:     1. Hematuria    Patient appears to be a poor historian especially with the timeline of events and the medications she is taking and when. I cannot confirm she saw a Urologist yesterday, therefore I am unsure if anyone has evaluated her hematuria. I discussed with her that due to her extensive problem history and medication allergies, she needs a specialist to evaluate this problem. She does not have a spare catheter with her at this appointment, so if there was a need to replace the catheter, I would not be equipped to do so. She V/U. Advised her to be evaluated in the ER because there are more resources there that can solve the problem. She agrees and ambulated out of the clinic with a steady gate.

## 2017-06-21 NOTE — ED NOTES
Bladder scanner preformed at 0205 pt had roughly 240cc retention, jara in place PTA, jara irrigated with saline 100cc, received 300 clear urine back.  Pt states feels better

## 2017-06-21 NOTE — ED PROVIDER NOTES
ED Provider Note    CHIEF COMPLAINT  Chief Complaint   Patient presents with   • Urinary Retention     jara in place       HPI  Kristin Balderrama is a 26 y.o. female who presents with urinary retention and no urine in the Jara catheter after going to bed at 11 PM patient states she woke up feeling as if she had to urinate. She states normally the back would be nearly full but there is no urine in the bag at all. Patient states she has some mild lower abdominal discomfort as well as some slight back pain. Patient has the Jara catheter secondary to urinary incontinence. Patient denies any fever, chills, nausea, vomiting, chest pain, shortness of breath    REVIEW OF SYSTEMS  See HPI for further details.     PAST MEDICAL HISTORY   has a past medical history of Scoliosis; Multiple personality disorder; Chronic UTI (9/18/2010); Heart burn; Pain (08-15-12); History of falling; Sinus tachycardia (10/31/2013); Urinary incontinence; Hypertension; Disorder of thyroid; Obesity; Pneumonia (2012); ASTHMA; Breath shortness; Anginal syndrome; Psychosis; Arthritis; PCOS (polycystic ovarian syndrome); Gynecological disorder; Renal disorder; Arrhythmia; Urinary bladder disorder; Tuberculosis; Sleep apnea; Mitochondrial disease; and Fall.    SOCIAL HISTORY  Social History     Social History Main Topics   • Smoking status: Passive Smoke Exposure - Never Smoker     Types: Cigarettes   • Smokeless tobacco: Never Used   • Alcohol Use: No   • Drug Use: No   • Sexual Activity: Not Currently     Birth Control/ Protection: Pill       SURGICAL HISTORY   has past surgical history that includes neuro dest facet l/s w/ig sngl (4/21/2015); recovery (1/27/2016); [place] by laparoscopy (8/29/2010); lumbar fusion anterior (8/21/2012); other cardiac surgery (1/2016); tonsillectomy; bowel stimulator insertion (7/13/2016); gastroscopy with balloon dilatation (N/A, 1/4/2017); muscle biopsy (Right, 1/26/2017); and other abdominal surgery.    CURRENT  "MEDICATIONS  Home Medications     Reviewed by Bertha Cohen R.N. (Registered Nurse) on 06/21/17 at 0328  Med List Status: Partial    Medication Last Dose Status    albuterol 108 (90 BASE) MCG/ACT Aero Soln inhalation aerosol 6/20/2017 Active    aspirin EC (ECOTRIN) 81 MG Tablet Delayed Response 6/20/2017 Active    baclofen (LIORESAL) 10 MG Tab 6/20/2017 Active    CRANBERRY PO 6/20/2017 Active    cyanocobalamin (HM VITAMIN B12) 500 MCG tablet 6/20/2017 Active    doxycycline (VIBRAMYCIN) 100 MG Tab  Active    fentanyl (DURAGESIC) 25 MCG/HR PATCH 72 HR 6/20/2017 Active    fluconazole (DIFLUCAN) 150 MG tablet 6/20/2017 Active    fluoxetine (PROZAC) 10 MG Cap 6/20/2017 Active    ivabradine (CORLANOR) 5 MG Tab tablet 6/20/2017 Active    lactulose 10 GM/15ML Solution 6/20/2017 Active    levothyroxine (SYNTHROID) 75 MCG Tab 6/20/2017 Active    liraglutide (VICTOZA) 18 MG/3ML Solution Pen-injector injection 6/20/2017 Active    Melatonin 5 MG Tab 6/20/2017 Active    Mirabegron ER (MYRBETRIQ) 25 MG TABLET SR 24 HR 6/20/2017 Active    nitrofurantoin macro crystals (MACRODANTIN) 100 MG Cap  Active    omeprazole (PRILOSEC) 20 MG delayed-release capsule 6/20/2017 Active    oxycodone-acetaminophen (PERCOCET-10)  MG Tab 6/20/2017 Active    promethazine (PHENERGAN) 25 MG Tab 6/20/2017 Active    ranitidine (ZANTAC) 150 MG Tab 6/20/2017 Active    sitagliptin (JANUVIA) 100 MG Tab 6/20/2017 Active    sodium bicarbonate 325 MG Tab 6/20/2017 Active    vitamin D, Ergocalciferol, (DRISDOL) 00376 UNITS Cap capsule 6/20/2017 Active    ziprasidone (GEODON) 80 MG Cap 6/20/2017 Active                ALLERGIES  Allergies   Allergen Reactions   • Cefdinir Shortness of Breath and Itching     Tolerated 1/18/17   • Depakote [Divalproex Sodium] Unspecified     Muscle spasms/muscle pain and weakness     • Abilify Unspecified     Headaches/muscle twitching     • Amitriptyline Unspecified     Headaches     • Amoxicillin Rash     Pt states \"I get " "a rash\".     • Ciprofloxacin Rash     Pt states \"I get a rash\".     • Clindamycin Nausea     Even with food     • Doxycycline Vomiting and Nausea     RXN=unknown   • Ees [Erythromycin] Vomiting and Nausea   • Flagyl [Metronidazole Hcl] Unspecified     \"eye problems\"     • Flomax [Tamsulosin Hydrochloride] Swelling   • Metformin Unspecified     Increased lactic acid      • Sulfa Drugs Hives and Rash     RXN=since childhood   • Tape Rash     Tears skin off   coban with Tegaderm tape ok  RXN=ongoing   • Vancomycin Itching     Pt becomes flushed in face and chest.   RXN=7/10/16   • Cephalexin [Keflex] Rash     Pt states she gets a rash with this medication   • Erythromycin    • Levofloxacin Unspecified     Leg muscle cramps   • Metronidazole    • Valproic Acid        PHYSICAL EXAM  VITAL SIGNS: /87 mmHg  Pulse 81  Temp(Src) 36.3 °C (97.3 °F)  Resp 16  Ht 1.6 m (5' 3\")  Wt 118 kg (260 lb 2.3 oz)  BMI 46.09 kg/m2  SpO2 97%  Constitutional: Well developed, Well nourished, mild distress.   HENT: Normocephalic, Atraumatic,   Eyes: PERRL, EOMI, Conjunctiva normal, No discharge.   Cardiovascular: Normal heart rate, Normal rhythm, No murmurs, equal pulses.   Pulmonary: Normal breath sounds, No respiratory distress, No wheezing,   Abdomen: Soft, slight tenderness to the suprapubic region where there is fullness., No masses, no rebound, no guarding. Andrade catheter in place, no urine in the bag  Back: No tenderness, slight left-sided CVA tenderness  Musculoskeletal: Good range of motion in all major joints. No tenderness to palpation or major deformities noted.   Skin: Warm, Dry, No erythema, No rash.   Neurologic: Alert & oriented x 3, Normal motor function,  No focal deficits noted.   Psychiatric: Affect normal, Judgment normal, Mood normal.       Laboratory tests  Results for orders placed or performed during the hospital encounter of 06/21/17   URINALYSIS,CULTURE IF INDICATED   Result Value Ref Range    Micro " Urine Req Microscopic     Color Lt. Yellow     Character Clear     Specific Gravity 1.008 <1.035    Ph 5.0 5.0-8.0    Glucose Negative Negative mg/dL    Ketones Negative Negative mg/dL    Protein Negative Negative mg/dL    Bilirubin Negative Negative    Nitrite Negative Negative    Leukocyte Esterase Negative Negative    Occult Blood Large (A) Negative    Culture Indicated No UA Culture   URINE MICROSCOPIC (W/UA)   Result Value Ref Range    WBC 0-2 /hpf    RBC 20-50 (A) /hpf    Bacteria Few (A) None /hpf    Mucous Threads Few /hpf   CBC WITHOUT DIFFERENTIAL   Result Value Ref Range    WBC 6.6 4.8 - 10.8 K/uL    RBC 4.21 4.20 - 5.40 M/uL    Hemoglobin 12.8 12.0 - 16.0 g/dL    Hematocrit 38.3 37.0 - 47.0 %    MCV 91.0 81.4 - 97.8 fL    MCH 30.4 27.0 - 33.0 pg    MCHC 33.4 (L) 33.6 - 35.0 g/dL    RDW 44.2 35.9 - 50.0 fL    Platelet Count 210 164 - 446 K/uL    MPV 11.3 9.0 - 12.9 fL   BASIC METABOLIC PANEL   Result Value Ref Range    Sodium 140 135 - 145 mmol/L    Potassium 4.0 3.6 - 5.5 mmol/L    Chloride 107 96 - 112 mmol/L    Co2 21 20 - 33 mmol/L    Glucose 116 (H) 65 - 99 mg/dL    Bun 17 8 - 22 mg/dL    Creatinine 0.76 0.50 - 1.40 mg/dL    Calcium 9.9 8.5 - 10.5 mg/dL    Anion Gap 12.0 (H) 0.0 - 11.9   ESTIMATED GFR   Result Value Ref Range    GFR If African American >60 >60 mL/min/1.73 m 2    GFR If Non African American >60 >60 mL/min/1.73 m 2     *Note: Due to a large number of results and/or encounters for the requested time period, some results have not been displayed. A complete set of results can be found in Results Review.       DX-HIP-COMPLETE - UNILATERAL 2+ LEFT   Final Result      1.  No LEFT hip or pelvic fracture.   2.  Symmetric mild degenerative change of both hips.   3.  Postoperative changes as described.      DX-KNEE 3 VIEWS LEFT   Final Result      No fracture or dislocation of LEFT knee.              COURSE & MEDICAL DECISION MAKING  Pertinent Labs & Imaging studies reviewed. (See chart for  details)  Patient presents with suprapubic abdominal pain and no urine and a Andrade catheter. Nursing staff flush the catheter and the patient had output. Labs and urinalysis were done secondary to patient CVA tenderness to make sure the patient did not have pyelonephritis or urinary tract infection. These are negative. Patient had been in the process of being discharged when she had to go to the bathroom and she fell. Therefore patient complained of right knee and hip pain. X-rays were taken. These are negative for fractures. Discussed using ice and taking ibuprofen and Tylenol for bruising.    FINAL IMPRESSION  1. Urinary retention    2. Obstructed Andrade catheter, initial encounter ([facility])               Electronically signed by: Glynn Hinds, 6/21/2017 3:34 AM    This record was made with a voice recognition software. The software is not perfect. I have tried to correct any grammar, spelling or context errors to the best of my ability, but errors may still remain. Interpretation of this chart should be taken in this context.

## 2017-06-21 NOTE — ED NOTES
Pt in bathroom after walking with nurse to bathroom , was told to call using cord, pt got up by self and slipped on shoe landing on left hip/knee area.  Pt stated we were busy and did not want to bother us.  Pt reeducated to please follow nursing advice when ambulating, pt was able to get up and walk to stretcher.  No deformity or redness noted at site( left hip/knee) just pain by pt, pt denies loc and hitting head.

## 2017-06-21 NOTE — ED AVS SNAPSHOT
6/21/2017    Kristin Balderrama  [address] [facility] [address]    Dear Kristin:    [facility] wants to ensure your discharge home is safe and you or your loved ones have had all of your questions answered regarding your care after you leave the hospital.    Below is a list of resources and contact information should you have any questions regarding your hospital stay, follow-up instructions, or active medical symptoms.    Questions or Concerns Regarding… Contact   Medical Questions Related to Your Discharge  (7 days a week, 8am-5pm) Contact a Nurse Care Coordinator   [phone]   Medical Questions Not Related to Your Discharge  (24 hours a day / 7 days a week)  Contact the Nurse Health Line   [phone]    Medications or Discharge Instructions Refer to your discharge packet   or contact your [facility] Primary Care Provider   [phone]   Follow-up Appointment(s) Schedule your appointment via [employer]   or contact Scheduling [phone]   Billing Review your statement via [employer]  or contact Billing [phone]   Medical Records Review your records via [employer]   or contact Medical Records [phone]     You may receive a telephone call within two days of discharge. This call is to make certain you understand your discharge instructions and have the opportunity to have any questions answered. You can also easily access your medical information, test results and upcoming appointments via the [employer] free online health management tool. You can learn more and sign up at [employer]. For assistance setting up your [employer] account, please call [phone].    Once again, we want to ensure your discharge home is safe and that you have a clear understanding of any next steps in your care. If you have any questions or concerns, please do not hesitate to contact us, we are here for you. Thank you for choosing [facility] for your healthcare needs.    Sincerely,    Your [facility] Healthcare Team

## 2017-06-21 NOTE — ED NOTES
Pt resting in bed, awaiting xray report and doc to speak with patient .  Bed in lowest position, no complaints at this time. resp even and non labored.

## 2017-06-21 NOTE — DISCHARGE INSTRUCTIONS
Acute Urinary Retention, Female  Urinary retention means you are unable to pee completely or at all (empty your bladder).  HOME CARE  · Drink enough fluids to keep your pee (urine) clear or pale yellow.  · If you are sent home with a tube that drains the bladder (catheter), there will be a drainage bag attached to it. There are two types of bags. One is big that you can wear at night without having to empty it. One is smaller and needs to be emptied more often.  · Keep the drainage bag emptied.  · Keep the drainage bag lower than the tube.  · Only take medicine as told by your doctor.  GET HELP IF:  1. You have a low-grade fever.  2. You have spasms or you are leaking pee when you have spasms.  GET HELP RIGHT AWAY IF:   1. You have chills or a fever.  2. Your catheter stops draining pee.  3. Your catheter falls out.  4. You have increased bleeding that does not stop after you have rested and increased the amount of fluids you had been drinking.  MAKE SURE YOU:   1. Understand these instructions.  2. Will watch your condition.  3. Will get help right away if you are not doing well or get worse.     This information is not intended to replace advice given to you by your health care provider. Make sure you discuss any questions you have with your health care provider.     Document Released: 06/05/2009 Document Revised: 05/03/2016 Document Reviewed: 05/29/2014  [employer] Interactive Patient Education ©2016 [employer]    Andrade Catheter Care, Adult  A Andrade catheter is a soft, flexible tube that is placed into the bladder to drain urine. A Andrade catheter may be inserted if:  · You leak urine or are not able to control when you urinate (urinary incontinence).  · You are not able to urinate when you need to (urinary retention).  · You had prostate surgery or surgery on the genitals.  · You have certain medical conditions, such as multiple sclerosis, dementia, or a spinal cord injury.  If you are going home with a Andrade  catheter in place, follow the instructions below.  TAKING CARE OF THE CATHETER  3. Wash your hands with soap and water.  4. Using mild soap and warm water on a clean washcloth:  ¨ Clean the area on your body closest to the catheter insertion site using a circular motion, moving away from the catheter. Never wipe toward the catheter because this could sweep bacteria up into the urethra and cause infection.  ¨ Remove all traces of soap. Pat the area dry with a clean towel. For males, reposition the foreskin.  5. Attach the catheter to your leg so there is no tension on the catheter. Use adhesive tape or a leg strap. If you are using adhesive tape, remove any sticky residue left behind by the previous tape you used.  6. Keep the drainage bag below the level of the bladder, but keep it off the floor.  7. Check throughout the day to be sure the catheter is working and urine is draining freely. Make sure the tubing does not become kinked.  8. Do not pull on the catheter or try to remove it. Pulling could damage internal tissues.  TAKING CARE OF THE DRAINAGE BAGS  You will be given two drainage bags to take home. One is a large overnight drainage bag, and the other is a smaller leg bag that fits underneath clothing. You may wear the overnight bag at any time, but you should never wear the smaller leg bag at night. Follow the instructions below for how to empty, change, and clean your drainage bags.  Emptying the Drainage Bag  You must empty your drainage bag when it is  -½ full or at least 2-3 times a day.  5. Wash your hands with soap and water.  6. Keep the drainage bag below your hips, below the level of your bladder. This stops urine from going back into the tubing and into your bladder.  7. Hold the dirty bag over the toilet or a clean container.  8. Open the pour spout at the bottom of the bag and empty the urine into the toilet or container. Do not let the pour spout touch the toilet, container, or any other  surface. Doing so can place bacteria on the bag, which can cause an infection.  9. Clean the pour spout with a gauze pad or cotton ball that has rubbing alcohol on it.  10. Close the pour spout.  11. Attach the bag to your leg with adhesive tape or a leg strap.  12. Wash your hands well.  Changing the Drainage Bag  Change your drainage bag once a month or sooner if it starts to smell bad or look dirty. Below are steps to follow when changing the drainage bag.  4. Wash your hands with soap and water.  5. Pinch off the rubber catheter so that urine does not spill out.  6. Disconnect the catheter tube from the drainage tube at the connection valve. Do not let the tubes touch any surface.  7. Clean the end of the catheter tube with an alcohol wipe. Use a different alcohol wipe to clean the end of the drainage tube.  8. Connect the catheter tube to the drainage tube of the clean drainage bag.  9. Attach the new bag to the leg with adhesive tape or a leg strap. Avoid attaching the new bag too tightly.  10. Wash your hands well.  Cleaning the Drainage Bag  1. Wash your hands with soap and water.  2. Wash the bag in warm, soapy water.  3. Rinse the bag thoroughly with warm water.  4. Fill the bag with a solution of white vinegar and water (1 cup vinegar to 1 qt warm water [.2 L vinegar to 1 L warm water]). Close the bag and soak it for 30 minutes in the solution.  5. Rinse the bag with warm water.  6. Hang the bag to dry with the pour spout open and hanging downward.  7. Store the clean bag (once it is dry) in a clean plastic bag.  8. Wash your hands well.  PREVENTING INFECTION  · Wash your hands before and after handling your catheter.  · Take showers daily and wash the area where the catheter enters your body. Do not take baths. Replace wet leg straps with dry ones, if this applies.  · Do not use powders, sprays, or lotions on the genital area. Only use creams, lotions, or ointments as directed by your caregiver.  · For  females, wipe from front to back after each bowel movement.  · Drink enough fluids to keep your urine clear or pale yellow unless you have a fluid restriction.  · Do not let the drainage bag or tubing touch or lie on the floor.  · Wear cotton underwear to absorb moisture and to keep your .  SEEK MEDICAL CARE IF:   · Your urine is cloudy or smells unusually bad.  · Your catheter becomes clogged.  · You are not draining urine into the bag or your bladder feels full.  · Your catheter starts to leak.  SEEK IMMEDIATE MEDICAL CARE IF:   · You have pain, swelling, redness, or pus where the catheter enters the body.  · You have pain in the abdomen, legs, lower back, or bladder.  · You have a fever.  · You see blood fill the catheter, or your urine is pink or red.  · You have nausea, vomiting, or chills.  · Your catheter gets pulled out.  MAKE SURE YOU:   · Understand these instructions.  · Will watch your condition.  · Will get help right away if you are not doing well or get worse.     This information is not intended to replace advice given to you by your health care provider. Make sure you discuss any questions you have with your health care provider.     Document Released: 12/18/2006 Document Revised: 05/04/2015 Document Reviewed: 12/09/2013  [employer] Interactive Patient Education ©2016 [employer]

## 2017-06-21 NOTE — ED NOTES
ED Positive Culture Follow-up/Notification Note:   Date: 06/20/2017      Patient seen in the ED on 6/15/2017 for SOB, sweating, wound around the catheter site.   1. Cystitis    2. Chronic indwelling Andrade catheter      New Prescriptions    NITROFURANTOIN MACRO CRYSTALS (MACRODANTIN) 100 MG CAP    Take 1 Cap by mouth 4 times a day for 7 days.     Allergies: Cefdinir; Depakote; Abilify; Amitriptyline; Amoxicillin; Ciprofloxacin; Clindamycin; Doxycycline; Ees; Flagyl; Flomax; Metformin; Sulfa drugs; Tape; Vancomycin; Cephalexin; Erythromycin; Levofloxacin; Metronidazole; and Valproic acid    Final cultures:   Results     Procedure Component Value Units Date/Time    URINE CULTURE(NEW) [[id number]]  (Abnormal) Collected:  06/15/17 1450    Order Status:  Completed Specimen Information:  Urine Updated:  06/17/17 1250     Significant Indicator POS (POS)      Source UR      Site --      Urine Culture -- (A)      Result:        Growth noted after further incubation,see below for  organism identification.       Urine Culture -- (A)      Result:        Diphtheroids  10-50,000 cfu/mL      URINALYSIS,CULTURE IF INDICATED [[id number]]  (Abnormal) Collected:  06/15/17 1450    Order Status:  Completed Specimen Information:  Urine Updated:  06/15/17 1541     Micro Urine Req Microscopic      Color Yellow      Character Clear      Specific Gravity 1.032      Ph 5.5      Glucose Negative mg/dL      Ketones Trace (A) mg/dL      Protein 70 (A) mg/dL      Bilirubin Negative      Nitrite Negative      Leukocyte Esterase Moderate (A)      Occult Blood Moderate (A)      Culture Indicated Yes UA Culture           Plan:   Culture positive for Diptheroids which is likely a contaminant. Pt prescribed Nitrofurantoin 100mg QID x7 days. Pt took 5 days of Nitrofurantoin before returning to the ER.   Pt is currently back in ER with c/o of blood in the urine.     Carolann Shoemaker, PharmD.  Pharmacy Practice Resident, PGY1

## 2017-06-21 NOTE — ED NOTES
"BIB by EMS for urniary cath problem, increasing pain and discomfort tonight \" it feels clogged\" no urine since 2300 last night.   "

## 2017-06-21 NOTE — ED AVS SNAPSHOT
Home Care Instructions                                                                                                                Kristin Balderrama   MRN: [id number]    Department:  [facility], Emergency Dept   Date of Visit:  6/21/2017            [facility], Emergency Dept    [address]    [facility] [address]    Phone:  [phone]      You were seen by     Glynn Hinds M.D.      Your Diagnosis Was     Urinary retention     R33.9       These are the medications you received during your hospitalization from 06/21/2017 0155 to 06/21/2017 0547     Date/Time Order Dose Route Action    06/21/2017 0330 ketorolac (TORADOL) injection 30 mg 30 mg Intramuscular Given      Follow-up Information     1. Schedule an appointment as soon as possible for a visit with Torres Brody M.D..    Specialty:  Internal Medicine    Why:  As needed    Contact information    [address]  [facility] [address]  [facility] [address] [phone]        Medication Information     Review all of your home medications and newly ordered medications with your primary doctor and/or pharmacist as soon as possible. Follow medication instructions as directed by your doctor and/or pharmacist.     Please keep your complete medication list with you and share with your physician. Update the information when medications are discontinued, doses are changed, or new medications (including over-the-counter products) are added; and carry medication information at all times in the event of emergency situations.               Medication List      ASK your doctor about these medications        Instructions    Morning Afternoon Evening Bedtime    albuterol 108 (90 BASE) MCG/ACT Aers inhalation aerosol        Inhale 2 Puffs by mouth every 6 hours as needed for Shortness of Breath.   Dose:  2 Puff                        aspirin EC 81 MG Tbec   Commonly known as:  ECOTRIN        Take 1 Tab by mouth every day.   Dose:  81 mg                         baclofen 10 MG Tabs   Commonly known as:  LIORESAL        Take 1 Tab by mouth 3 times a day.   Dose:  10 mg                        CRANBERRY PO        Take 100,400 mg by mouth 2 times a day.   Dose:  103881 mg                        doxycycline 100 MG Tabs   Commonly known as:  VIBRAMYCIN        Take 1 Tab by mouth 2 times a day.   Dose:  100 mg                        fentanyl 25 MCG/HR Pt72   Commonly known as:  DURAGESIC        Apply 1 Patch to skin as directed every 72 hours.   Dose:  1 Patch                        fluconazole 150 MG tablet   Commonly known as:  DIFLUCAN        Take 1 Tab by mouth every day. Take on day day one and day 3.   Dose:  150 mg                        fluoxetine 10 MG Caps   Commonly known as:  PROZAC        TAKE ONE CAPSULE BY MOUTH ONCE A DAY                        HM VITAMIN B12 500 MCG tablet   Generic drug:  cyanocobalamin        Take 1,000 mcg by mouth 2 times a day.   Dose:  1000 mcg                        ivabradine 5 MG Tabs tablet   Commonly known as:  CORLANOR        Take 1 Tab by mouth 2 times a day, with meals.   Dose:  5 mg                        lactulose 10 GM/15ML Soln        Take 10 g by mouth 2 times a day as needed.   Dose:  10 g                        levothyroxine 75 MCG Tabs   Commonly known as:  SYNTHROID        Take 75 mcg by mouth every morning.   Dose:  75 mcg                        liraglutide 18 MG/3ML Sopn injection   Commonly known as:  VICTOZA        Inject 0.3 mL as instructed every day.   Dose:  1.8 mg                        Melatonin 5 MG Tabs        Doctor's comments:  Takes two tabs at Bedtime (10 mg.)   Take 10 mg by mouth every bedtime.   Dose:  10 mg                        MYRBETRIQ 25 MG Tb24   Generic drug:  Mirabegron ER        Take 1 Tab by mouth every day.   Dose:  1 Tab                        nitrofurantoin macro crystals 100 MG Caps   Commonly known as:  MACRODANTIN        Take 1 Cap by mouth 4 times a day for 7 days.     Dose:  100 mg                        omeprazole 20 MG delayed-release capsule   Commonly known as:  PRILOSEC        Take 20 mg by mouth 2 times a day.   Dose:  20 mg                        oxycodone-acetaminophen  MG Tabs   Commonly known as:  PERCOCET-10        Take 2 Tabs by mouth every 6 hours as needed for Severe Pain.   Dose:  2 Tab                        promethazine 25 MG Tabs   Commonly known as:  PHENERGAN        Take 1 Tab by mouth every 6 hours as needed for Nausea/Vomiting.   Dose:  25 mg                        ranitidine 150 MG Tabs   Commonly known as:  ZANTAC        Take 150 mg by mouth 2 times a day.   Dose:  150 mg                        sitagliptin 100 MG Tabs   Commonly known as:  JANUVIA        Take 1 Tab by mouth every day.   Dose:  100 mg                        sodium bicarbonate 325 MG Tabs        Take 2 Tabs by mouth 3 times a day.   Dose:  650 mg                        vitamin D (Ergocalciferol) 04955 UNITS Caps capsule   Commonly known as:  DRISDOL        Take 50,000 Units by mouth every Friday.   Dose:  44066 Units                        ziprasidone 80 MG Caps   Commonly known as:  GEODON        Take 160 mg by mouth every evening. Takes this at 1700 daily   Dose:  160 mg                                Procedures and tests performed during your visit     BASIC METABOLIC PANEL    CBC WITHOUT DIFFERENTIAL    DX-HIP-COMPLETE - UNILATERAL 2+ LEFT    DX-KNEE 3 VIEWS LEFT    ESTIMATED GFR    URINALYSIS,CULTURE IF INDICATED    URINE MICROSCOPIC (W/UA)        Discharge Instructions       Acute Urinary Retention, Female  Urinary retention means you are unable to pee completely or at all (empty your bladder).  HOME CARE  · Drink enough fluids to keep your pee (urine) clear or pale yellow.  · If you are sent home with a tube that drains the bladder (catheter), there will be a drainage bag attached to it. There are two types of bags. One is big that you can wear at night without having to empty  it. One is smaller and needs to be emptied more often.  · Keep the drainage bag emptied.  · Keep the drainage bag lower than the tube.  · Only take medicine as told by your doctor.  GET HELP IF:  1. You have a low-grade fever.  2. You have spasms or you are leaking pee when you have spasms.  GET HELP RIGHT AWAY IF:   1. You have chills or a fever.  2. Your catheter stops draining pee.  3. Your catheter falls out.  4. You have increased bleeding that does not stop after you have rested and increased the amount of fluids you had been drinking.  MAKE SURE YOU:   1. Understand these instructions.  2. Will watch your condition.  3. Will get help right away if you are not doing well or get worse.     This information is not intended to replace advice given to you by your health care provider. Make sure you discuss any questions you have with your health care provider.     Document Released: 06/05/2009 Document Revised: 05/03/2016 Document Reviewed: 05/29/2014  [employer] Interactive Patient Education ©2016 [employer] Inc.    Andrade Catheter Care, Adult  A Andrade catheter is a soft, flexible tube that is placed into the bladder to drain urine. A Andrade catheter may be inserted if:  · You leak urine or are not able to control when you urinate (urinary incontinence).  · You are not able to urinate when you need to (urinary retention).  · You had prostate surgery or surgery on the genitals.  · You have certain medical conditions, such as multiple sclerosis, dementia, or a spinal cord injury.  If you are going home with a Andrade catheter in place, follow the instructions below.  TAKING CARE OF THE CATHETER  3. Wash your hands with soap and water.  4. Using mild soap and warm water on a clean washcloth:  ¨ Clean the area on your body closest to the catheter insertion site using a circular motion, moving away from the catheter. Never wipe toward the catheter because this could sweep bacteria up into the urethra and cause  infection.  ¨ Remove all traces of soap. Pat the area dry with a clean towel. For males, reposition the foreskin.  5. Attach the catheter to your leg so there is no tension on the catheter. Use adhesive tape or a leg strap. If you are using adhesive tape, remove any sticky residue left behind by the previous tape you used.  6. Keep the drainage bag below the level of the bladder, but keep it off the floor.  7. Check throughout the day to be sure the catheter is working and urine is draining freely. Make sure the tubing does not become kinked.  8. Do not pull on the catheter or try to remove it. Pulling could damage internal tissues.  TAKING CARE OF THE DRAINAGE BAGS  You will be given two drainage bags to take home. One is a large overnight drainage bag, and the other is a smaller leg bag that fits underneath clothing. You may wear the overnight bag at any time, but you should never wear the smaller leg bag at night. Follow the instructions below for how to empty, change, and clean your drainage bags.  Emptying the Drainage Bag  You must empty your drainage bag when it is  -½ full or at least 2-3 times a day.  5. Wash your hands with soap and water.  6. Keep the drainage bag below your hips, below the level of your bladder. This stops urine from going back into the tubing and into your bladder.  7. Hold the dirty bag over the toilet or a clean container.  8. Open the pour spout at the bottom of the bag and empty the urine into the toilet or container. Do not let the pour spout touch the toilet, container, or any other surface. Doing so can place bacteria on the bag, which can cause an infection.  9. Clean the pour spout with a gauze pad or cotton ball that has rubbing alcohol on it.  10. Close the pour spout.  11. Attach the bag to your leg with adhesive tape or a leg strap.  12. Wash your hands well.  Changing the Drainage Bag  Change your drainage bag once a month or sooner if it starts to smell bad or look dirty.  Below are steps to follow when changing the drainage bag.  4. Wash your hands with soap and water.  5. Pinch off the rubber catheter so that urine does not spill out.  6. Disconnect the catheter tube from the drainage tube at the connection valve. Do not let the tubes touch any surface.  7. Clean the end of the catheter tube with an alcohol wipe. Use a different alcohol wipe to clean the end of the drainage tube.  8. Connect the catheter tube to the drainage tube of the clean drainage bag.  9. Attach the new bag to the leg with adhesive tape or a leg strap. Avoid attaching the new bag too tightly.  10. Wash your hands well.  Cleaning the Drainage Bag  1. Wash your hands with soap and water.  2. Wash the bag in warm, soapy water.  3. Rinse the bag thoroughly with warm water.  4. Fill the bag with a solution of white vinegar and water (1 cup vinegar to 1 qt warm water [.2 L vinegar to 1 L warm water]). Close the bag and soak it for 30 minutes in the solution.  5. Rinse the bag with warm water.  6. Hang the bag to dry with the pour spout open and hanging downward.  7. Store the clean bag (once it is dry) in a clean plastic bag.  8. Wash your hands well.  PREVENTING INFECTION  · Wash your hands before and after handling your catheter.  · Take showers daily and wash the area where the catheter enters your body. Do not take baths. Replace wet leg straps with dry ones, if this applies.  · Do not use powders, sprays, or lotions on the genital area. Only use creams, lotions, or ointments as directed by your caregiver.  · For females, wipe from front to back after each bowel movement.  · Drink enough fluids to keep your urine clear or pale yellow unless you have a fluid restriction.  · Do not let the drainage bag or tubing touch or lie on the floor.  · Wear cotton underwear to absorb moisture and to keep your .  SEEK MEDICAL CARE IF:   · Your urine is cloudy or smells unusually bad.  · Your catheter becomes  clogged.  · You are not draining urine into the bag or your bladder feels full.  · Your catheter starts to leak.  SEEK IMMEDIATE MEDICAL CARE IF:   · You have pain, swelling, redness, or pus where the catheter enters the body.  · You have pain in the abdomen, legs, lower back, or bladder.  · You have a fever.  · You see blood fill the catheter, or your urine is pink or red.  · You have nausea, vomiting, or chills.  · Your catheter gets pulled out.  MAKE SURE YOU:   · Understand these instructions.  · Will watch your condition.  · Will get help right away if you are not doing well or get worse.     This information is not intended to replace advice given to you by your health care provider. Make sure you discuss any questions you have with your health care provider.     Document Released: 12/18/2006 Document Revised: 05/04/2015 Document Reviewed: 12/09/2013  [employer] Interactive Patient Education ©2016 [employer]            Patient Information     Patient Information    Following emergency treatment: all patient requiring follow-up care must return either to a private physician or a clinic if your condition worsens before you are able to obtain further medical attention, please return to the emergency room.     Billing Information    [facility] we work to make the billing process streamlined for our patients.  Our Representatives are here to answer any questions you may have regarding your hospital bill.  If you have insurance coverage and have supplied your insurance information to us, we will submit a claim to your insurer on your behalf.  Should you have any questions regarding your bill, we can be reached online or by phone as follows:  Online: You are able pay your bills online or live chat with our representatives about any billing questions you may have. We are here to help Monday - Friday from 8:00am to 7:30pm and 9:00am - 12:00pm on Saturdays.  Please visit  [email or web address]  for more information.   Phone:  [phone] or [phone]    Please note that your emergency physician, surgeon, pathologist, radiologist, anesthesiologist, and other specialists are not employed by [facility] and will therefore bill separately for their services.  Please contact them directly for any questions concerning their bills at the numbers below:     Emergency Physician Services:  [phone]  [facility]:  [phone]  [facility]:  [phone]  [facility]:  [phone]    1. Your final bill may vary from the amount quoted upon discharge if all procedures are not complete at that time, or if your doctor has additional procedures of which we are not aware. You will receive an additional bill if you return to the Emergency Department at [facility] for suture removal regardless of the facility of which the sutures were placed.     2. Please arrange for settlement of this account at the emergency registration.    3. All self-pay accounts are due in full at the time of treatment.  If you are unable to meet this obligation then payment is expected within 4-5 days.     4. If you have had radiology studies (CT, X-ray, Ultrasound, MRI), you have received a preliminary result during your emergency department visit. Please contact the radiology department [phone] to receive a copy of your final result. Please discuss the Final result with your primary physician or with the follow up physician provided.     Crisis Hotline:  [place] Crisis Hotline:  0-154-ZBTRKMD or 1-397.860.7713  [place] Crisis Hotline:    1-627.374.7430 or 021-462-6529         ED Discharge Follow Up Questions    1. In order to provide you with very good care, we would like to follow up with a phone call in the next few days.  May we have your permission to contact you?     YES /  NO    2. What is the best phone number to call  you? (       )_____-__________    3. What is the best time to call you?      Morning  /  Afternoon  /  Evening                   Patient Signature:  ____________________________________________________________    Date:  ____________________________________________________________      Your appointments     Jul 06, 2017  6:45 AM   Adult Draw/Collection with [facility]   [facility] (--)    [address]  [place] [address]   [phone]            Jul 14, 2017 10:00 AM   Follow Up Med Management with Ronny Burnette M.D.   [facility] [address] [place] [address]  [address]  [place] [address]   [phone]            Jul 17, 2017 10:00 AM   Individual Therapy with Blanca Brantley L.C.S.W.   [facility]    [address]  [address]  [place] [address] [phone]            Jul 19, 2017  8:40 AM   FOLLOW UP with Torres Kumar M.D.   [facility] (--)    [address] [place] [address] [address]  [place] [id number] [phone]            Aug 11, 2017 10:00 AM   Follow Up Visit with Sandoval Fox M.D.   [facility] (--)    [address], [address]  [place] [id number] [phone]           You will be receiving a confirmation call a few days before your appointment from our automated call confirmation system.            Aug 23, 2017  9:00 AM   Established Patient with Torres Brody M.D.   [facility] [address]    [address]  [address]  [place] [address] [phone]           You will be receiving a confirmation call a few days before your appointment from our automated call confirmation system.

## 2017-06-21 NOTE — ED NOTES
Pt urinary bag draining to gravity, 400cc clear yellow urine presently in bag.  Pt verbalized understanding of poc and discharge instructions, no questions at this time.  resp even and non labored, pt taken out via wheelchair.

## 2017-06-21 NOTE — ED NOTES
Pt ambs to triage with blood in urine x2 days.  Denies any trauma, is taking antibx for UTI x 5 days.  Pt has supra pubic cath in place.  Pt is annoyed at taking her current weight, sitting in the chair and being asked to wait in the lobby.  Triage process explained. Pt asked to await room assignment in lobby. Pt urged to inform triage RN or staff of changes in condition or concerns

## 2017-06-21 NOTE — ED AVS SNAPSHOT
[employer] Access Code: Activation code not generated  Current [employer] Status: Active    [email or web address]  A secure, online tool to manage your health information     [employer]® is a secure, online tool that connects you to your personalized health information from the privacy of your home -- day or night - making it very easy for you to manage your healthcare. Once the activation process is completed, you can even access your medical information using the [employer] rocio, which is available for free in the Apple Rocio store or Google Play store.     [employer] provides the following levels of access (as shown below):   My Chart Features   [facility] Primary Care Doctor [facility]  Specialists [facility]  Urgent  Care Non-[facility]  Primary Care  Doctor   Email your healthcare team securely and privately 24/7 X X X X   Manage appointments: schedule your next appointment; view details of past/upcoming appointments X      Request prescription refills. X      View recent personal medical records, including lab and immunizations X X X X   View health record, including health history, allergies, medications X X X X   Read reports about your outpatient visits, procedures, consult and ER notes X X X X   See your discharge summary, which is a recap of your hospital and/or ER visit that includes your diagnosis, lab results, and care plan. X X       How to register for [employer]:  1. Go to  [email or web address].  2. Click on the Sign Up Now box, which takes you to the New Member Sign Up page. You will need to provide the following information:  a. Enter your [employer] Access Code exactly as it appears at the top of this page. (You will not need to use this code after you’ve completed the sign-up process. If you do not sign up before the expiration date, you must request a new code.)   b. Enter your date of birth.   c. Enter your home email address.   d. Click Submit, and follow the next screen’s instructions.  3. Create a [employer] ID. This will  be your [facility] login ID and cannot be changed, so think of one that is secure and easy to remember.  4. Create a [facility] password. You can change your password at any time.  5. Enter your Password Reset Question and Answer. This can be used at a later time if you forget your password.   6. Enter your e-mail address. This allows you to receive e-mail notifications when new information is available in [facility].  7. Click Sign Up. You can now view your health information.    For assistance activating your [facility] account, call [phone]

## 2017-06-25 ENCOUNTER — HOSPITAL ENCOUNTER (EMERGENCY)
Facility: MEDICAL CENTER | Age: 27
End: 2017-06-25
Attending: EMERGENCY MEDICINE
Payer: MEDICARE

## 2017-06-25 VITALS
DIASTOLIC BLOOD PRESSURE: 70 MMHG | WEIGHT: 260.14 LBS | OXYGEN SATURATION: 96 % | SYSTOLIC BLOOD PRESSURE: 115 MMHG | BODY MASS INDEX: 46.09 KG/M2 | RESPIRATION RATE: 16 BRPM | HEIGHT: 63 IN | TEMPERATURE: 97.5 F | HEART RATE: 75 BPM

## 2017-06-25 DIAGNOSIS — T83.9XXA PROBLEM WITH FOLEY CATHETER, INITIAL ENCOUNTER (HCC): ICD-10-CM

## 2017-06-25 PROCEDURE — 51702 INSERT TEMP BLADDER CATH: CPT

## 2017-06-25 PROCEDURE — 99283 EMERGENCY DEPT VISIT LOW MDM: CPT

## 2017-06-25 PROCEDURE — 51798 US URINE CAPACITY MEASURE: CPT

## 2017-06-25 PROCEDURE — 303105 HCHG CATHETER EXTRA

## 2017-06-25 ASSESSMENT — PAIN SCALES - GENERAL
PAINLEVEL_OUTOF10: 0
PAINLEVEL_OUTOF10: 9

## 2017-06-25 NOTE — ED AVS SNAPSHOT
6/25/2017    Kristin Balderrama  [address] [address]    Dear Kristin:    [facility] wants to ensure your discharge home is safe and you or your loved ones have had all of your questions answered regarding your care after you leave the hospital.    Below is a list of resources and contact information should you have any questions regarding your hospital stay, follow-up instructions, or active medical symptoms.    Questions or Concerns Regarding… Contact   Medical Questions Related to Your Discharge  (7 days a week, 8am-5pm) Contact a Nurse Care Coordinator   [phone]   Medical Questions Not Related to Your Discharge  (24 hours a day / 7 days a week)  Contact the Nurse Health Line   [phone]    Medications or Discharge Instructions Refer to your discharge packet   or contact your [facility] Primary Care Provider   [phone]   Follow-up Appointment(s) Schedule your appointment via [employer]   or contact Scheduling [phone]   Billing Review your statement via [employer]  or contact Billing [phone]   Medical Records Review your records via [employer]   or contact Medical Records [phone]     You may receive a telephone call within two days of discharge. This call is to make certain you understand your discharge instructions and have the opportunity to have any questions answered. You can also easily access your medical information, test results and upcoming appointments via the [employer] free online health management tool. You can learn more and sign up at [employer]. For assistance setting up your [employer] account, please call [phone].    Once again, we want to ensure your discharge home is safe and that you have a clear understanding of any next steps in your care. If you have any questions or concerns, please do not hesitate to contact us, we are here for you. Thank you for choosing [facility] for your healthcare needs.    Sincerely,    Your [facility] Healthcare Team

## 2017-06-25 NOTE — ED AVS SNAPSHOT
Home Care Instructions                                                                                                                Kristin Balderrama   MRN: [id number]    Department:  [facility], Emergency Dept   Date of Visit:  6/25/2017            [facility], Emergency Dept    [address] [facility] [address]    Phone:  [phone]      You were seen by     Laron Vela M.D.      Your Diagnosis Was     Problem with Andrade catheter, initial encounter ([facility])     T83.9XXA       Follow-up Information     1. Follow up with Torres Brody M.D..    Specialty:  Internal Medicine    Contact information    [address]  [facility] [address]  [facility] [address] [phone]          2. Follow up with [facility], Emergency Dept.    Specialty:  Emergency Medicine    Why:  As needed, If symptoms worsen    Contact information    [address] [facility] [address] [phone]      Medication Information     Review all of your home medications and newly ordered medications with your primary doctor and/or pharmacist as soon as possible. Follow medication instructions as directed by your doctor and/or pharmacist.     Please keep your complete medication list with you and share with your physician. Update the information when medications are discontinued, doses are changed, or new medications (including over-the-counter products) are added; and carry medication information at all times in the event of emergency situations.               Medication List      ASK your doctor about these medications        Instructions    Morning Afternoon Evening Bedtime    albuterol 108 (90 BASE) MCG/ACT Aers inhalation aerosol        Inhale 2 Puffs by mouth every 6 hours as needed for Shortness of Breath.   Dose:  2 Puff                        aspirin EC 81 MG Tbec   Commonly known as:  ECOTRIN        Take 1 Tab by mouth every day.   Dose:  81 mg                        baclofen 10  MG Tabs   Commonly known as:  LIORESAL        Take 1 Tab by mouth 3 times a day.   Dose:  10 mg                        CRANBERRY PO        Take 100,400 mg by mouth 2 times a day.   Dose:  264887 mg                        doxycycline 100 MG Tabs   Commonly known as:  VIBRAMYCIN        Take 1 Tab by mouth 2 times a day.   Dose:  100 mg                        fentanyl 25 MCG/HR Pt72   Commonly known as:  DURAGESIC        Apply 1 Patch to skin as directed every 72 hours.   Dose:  1 Patch                        fluconazole 150 MG tablet   Commonly known as:  DIFLUCAN        Take 1 Tab by mouth every day. Take on day day one and day 3.   Dose:  150 mg                        fluoxetine 10 MG Caps   Commonly known as:  PROZAC        TAKE ONE CAPSULE BY MOUTH ONCE A DAY                        HM VITAMIN B12 500 MCG tablet   Generic drug:  cyanocobalamin        Take 1,000 mcg by mouth 2 times a day.   Dose:  1000 mcg                        ivabradine 5 MG Tabs tablet   Commonly known as:  CORLANOR        Take 1 Tab by mouth 2 times a day, with meals.   Dose:  5 mg                        lactulose 10 GM/15ML Soln        Take 10 g by mouth 2 times a day as needed.   Dose:  10 g                        levothyroxine 75 MCG Tabs   Commonly known as:  SYNTHROID        Take 75 mcg by mouth every morning.   Dose:  75 mcg                        liraglutide 18 MG/3ML Sopn injection   Commonly known as:  VICTOZA        Inject 0.3 mL as instructed every day.   Dose:  1.8 mg                        Melatonin 5 MG Tabs        Doctor's comments:  Takes two tabs at Bedtime (10 mg.)   Take 10 mg by mouth every bedtime.   Dose:  10 mg                        MYRBETRIQ 25 MG Tb24   Generic drug:  Mirabegron ER        Take 1 Tab by mouth every day.   Dose:  1 Tab                        omeprazole 20 MG delayed-release capsule   Commonly known as:  PRILOSEC        Take 20 mg by mouth 2 times a day.   Dose:  20 mg                         oxycodone-acetaminophen  MG Tabs   Commonly known as:  PERCOCET-10        Take 2 Tabs by mouth every 6 hours as needed for Severe Pain.   Dose:  2 Tab                        promethazine 25 MG Tabs   Commonly known as:  PHENERGAN        Take 1 Tab by mouth every 6 hours as needed for Nausea/Vomiting.   Dose:  25 mg                        ranitidine 150 MG Tabs   Commonly known as:  ZANTAC        Take 150 mg by mouth 2 times a day.   Dose:  150 mg                        sitagliptin 100 MG Tabs   Commonly known as:  JANUVIA        Take 1 Tab by mouth every day.   Dose:  100 mg                        sodium bicarbonate 325 MG Tabs        Take 2 Tabs by mouth 3 times a day.   Dose:  650 mg                        vitamin D (Ergocalciferol) 85179 UNITS Caps capsule   Commonly known as:  DRISDOL        Take 50,000 Units by mouth every Friday.   Dose:  60412 Units                        ziprasidone 80 MG Caps   Commonly known as:  GEODON        Take 160 mg by mouth every evening. Takes this at 1700 daily   Dose:  160 mg                                Procedures and tests performed during your visit     BLADDER SCAN    CATH URO FORRESTER 18FR 5CC    O2 MASK / CANNULA        Discharge Instructions       Forrester Catheter Care, Adult   A Forrester catheter is a soft, flexible tube that is placed into the bladder to drain urine. A Forrester catheter may be inserted if:   You leak urine or are not able to control when you urinate (urinary incontinence).   You are not able to urinate when you need to (urinary retention).   You had prostate surgery or surgery on the genitals.   You have certain medical conditions, such as multiple sclerosis, dementia, or a spinal cord injury.  If you are going home with a Forrester catheter in place, follow the instructions below.   TAKING CARE OF THE CATHETER   1. Wash your hands with soap and water.  2. Using mild soap and warm water on a clean washcloth:  Clean the area on your body closest to the catheter  insertion site using a circular motion, moving away from the catheter. Never wipe toward the catheter because this could sweep bacteria up into the urethra and cause infection.   Remove all traces of soap. Pat the area dry with a clean towel. For males, reposition the foreskin.  3. Attach the catheter to your leg so there is no tension on the catheter. Use adhesive tape or a leg strap. If you are using adhesive tape, remove any sticky residue left behind by the previous tape you used.  4. Keep the drainage bag below the level of the bladder, but keep it off the floor.  5. Check throughout the day to be sure the catheter is working and urine is draining freely. Make sure the tubing does not become kinked.  6. Do not pull on the catheter or try to remove it. Pulling could damage internal tissues.  TAKING CARE OF THE DRAINAGE BAGS   You will be given two drainage bags to take home. One is a large overnight drainage bag, and the other is a smaller leg bag that fits underneath clothing. You may wear the overnight bag at any time, but you should never wear the smaller leg bag at night. Follow the instructions below for how to empty, change, and clean your drainage bags.   Emptying the Drainage Bag   You must empty your drainage bag when it is -½ full or at least 2-3 times a day.   1. Wash your hands with soap and water.  2. Keep the drainage bag below your hips, below the level of your bladder. This stops urine from going back into the tubing and into your bladder.  3. Hold the dirty bag over the toilet or a clean container.  4. Open the pour spout at the bottom of the bag and empty the urine into the toilet or container. Do not let the pour spout touch the toilet, container, or any other surface. Doing so can place bacteria on the bag, which can cause an infection.  5. Clean the pour spout with a gauze pad or cotton ball that has rubbing alcohol on it.  6. Close the pour spout.  7. Attach the bag to your leg with  adhesive tape or a leg strap.  8. Wash your hands well.  Changing the Drainage Bag   Change your drainage bag once a month or sooner if it starts to smell bad or look dirty. Below are steps to follow when changing the drainage bag.   1. Wash your hands with soap and water.  2. Pinch off the rubber catheter so that urine does not spill out.  3. Disconnect the catheter tube from the drainage tube at the connection valve. Do not let the tubes touch any surface.  4. Clean the end of the catheter tube with an alcohol wipe. Use a different alcohol wipe to clean the end of the drainage tube.  5. Connect the catheter tube to the drainage tube of the clean drainage bag.  6. Attach the new bag to the leg with adhesive tape or a leg strap. Avoid attaching the new bag too tightly.  7. Wash your hands well.  Cleaning the Drainage Bag   1. Wash your hands with soap and water.  2. Wash the bag in warm, soapy water.  3. Rinse the bag thoroughly with warm water.  4. Fill the bag with a solution of white vinegar and water (1 cup vinegar to 1 qt warm water [.2 L vinegar to 1 L warm water]). Close the bag and soak it for 30 minutes in the solution.  5. Rinse the bag with warm water.  6. Hang the bag to dry with the pour spout open and hanging downward.  7. Store the clean bag (once it is dry) in a clean plastic bag.  8. Wash your hands well.  PREVENTING INFECTION   Wash your hands before and after handling your catheter.   Take showers daily and wash the area where the catheter enters your body. Do not take baths. Replace wet leg straps with dry ones, if this applies.   Do not use powders, sprays, or lotions on the genital area. Only use creams, lotions, or ointments as directed by your caregiver.   For females, wipe from front to back after each bowel movement.   Drink enough fluids to keep your urine clear or pale yellow unless you have a fluid restriction.   Do not let the drainage bag or tubing touch or lie on the floor.   Wear  cotton underwear to absorb moisture and to keep your .  SEEK MEDICAL CARE IF:   Your urine is cloudy or smells unusually bad.   Your catheter becomes clogged.   You are not draining urine into the bag or your bladder feels full.   Your catheter starts to leak.  SEEK IMMEDIATE MEDICAL CARE IF:   You have pain, swelling, redness, or pus where the catheter enters the body.   You have pain in the abdomen, legs, lower back, or bladder.   You have a fever.   You see blood fill the catheter, or your urine is pink or red.   You have nausea, vomiting, or chills.   Your catheter gets pulled out.  MAKE SURE YOU:   Understand these instructions.   Will watch your condition.   Will get help right away if you are not doing well or get worse.  This information is not intended to replace advice given to you by your health care provider. Make sure you discuss any questions you have with your health care provider.   Document Released: 12/18/2006 Document Revised: 05/04/2015 Document Reviewed: 12/09/2013   [employer] Interactive Patient Education ©2016 [employer]             Patient Information     Patient Information    Following emergency treatment: all patient requiring follow-up care must return either to a private physician or a clinic if your condition worsens before you are able to obtain further medical attention, please return to the emergency room.     Billing Information    At [facility], we work to make the billing process streamlined for our patients.  Our Representatives are here to answer any questions you may have regarding your hospital bill.  If you have insurance coverage and have supplied your insurance information to us, we will submit a claim to your insurer on your behalf.  Should you have any questions regarding your bill, we can be reached online or by phone as follows:  Online: You are able pay your bills online or live chat with our representatives about any billing questions you may have. We  are here to help Monday - Friday from 8:00am to 7:30pm and 9:00am - 12:00pm on Saturdays.  Please visit [email or web address]  for more information.   Phone:  [phone] or [phone]    Please note that your emergency physician, surgeon, pathologist, radiologist, anesthesiologist, and other specialists are not employed by [facility] and will therefore bill separately for their services.  Please contact them directly for any questions concerning their bills at the numbers below:     Emergency Physician Services:  [phone]  [facility]:  [phone]  [facility]:  [phone]  [facility]:  [phone]    1. Your final bill may vary from the amount quoted upon discharge if all procedures are not complete at that time, or if your doctor has additional procedures of which we are not aware. You will receive an additional bill if you return to the Emergency Department at [facility] for suture removal regardless of the facility of which the sutures were placed.     2. Please arrange for settlement of this account at the emergency registration.    3. All self-pay accounts are due in full at the time of treatment.  If you are unable to meet this obligation then payment is expected within 4-5 days.     4. If you have had radiology studies (CT, X-ray, Ultrasound, MRI), you have received a preliminary result during your emergency department visit. Please contact the radiology department [phone] to receive a copy of your final result. Please discuss the Final result with your primary physician or with the follow up physician provided.     Crisis Hotline:  [place] Crisis Hotline:  3-047-EQBMPIX or 1-676.920.2310  [place] Crisis Hotline:    1-400.286.7300 or 773-812-1644         ED Discharge Follow Up Questions    1. In order to provide you with very good care, we would like to follow up with a phone call in the next few days.  May  we have your permission to contact you?     YES /  NO    2. What is the best phone number to call you? (       )_____-__________    3. What is the best time to call you?      Morning  /  Afternoon  /  Evening                   Patient Signature:  ____________________________________________________________    Date:  ____________________________________________________________      Your appointments     Jul 06, 2017  6:45 AM   Adult Draw/Collection with [facility]   [facility] (--)    [address] [place] [address]   [phone]            Jul 14, 2017 10:00 AM   Follow Up Med Management with Ronny Burnette M.D.   BEHAVIORAL HEALTH [address] [place] [address])    [address] [place] [address]  [address]  [place] [address]   [phone]            Jul 17, 2017 10:00 AM   Individual Therapy with Blanca Brantley L.C.S.W.   [facility]    [address]  [place] [address] [phone]            Jul 19, 2017  8:40 AM   FOLLOW UP with Torres Kumar M.D.   [facility] (--)    [address] [facility] [address]  [place] [address]   [phone]            Aug 11, 2017 10:00 AM   Follow Up Visit with Sandoval Fox M.D.   [facility] (--)    [address], [address]  [facility] [address]   [phone]           You will be receiving a confirmation call a few days before your appointment from our automated call confirmation system.            Aug 23, 2017  9:00 AM   Established Patient with Torres Brody M.D.   [facility] [address]    [address] [facility] [address]  [place] [address]   [phone]           You will be receiving a confirmation call a few days before your appointment from our automated call confirmation system.

## 2017-06-25 NOTE — ED AVS SNAPSHOT
Nimia Access Code: Activation code not generated  Current Nimia Status: Active    [employer]  A secure, online tool to manage your health information     [employer]’s Nimia® is a secure, online tool that connects you to your personalized health information from the privacy of your home -- day or night - making it very easy for you to manage your healthcare. Once the activation process is completed, you can even access your medical information using the Nimia rocio, which is available for free in the Apple Rocio store or Google Play store.     Nimia provides the following levels of access (as shown below):   My Chart Features   [facility] Primary Care Doctor [facility]  Specialists [facility]  Urgent  Care Non-[facility]  Primary Care  Doctor   Email your healthcare team securely and privately 24/7 X X X X   Manage appointments: schedule your next appointment; view details of past/upcoming appointments X      Request prescription refills. X      View recent personal medical records, including lab and immunizations X X X X   View health record, including health history, allergies, medications X X X X   Read reports about your outpatient visits, procedures, consult and ER notes X X X X   See your discharge summary, which is a recap of your hospital and/or ER visit that includes your diagnosis, lab results, and care plan. X X       How to register for Nimia:  1. Go to  [email or web address].  2. Click on the Sign Up Now box, which takes you to the New Member Sign Up page. You will need to provide the following information:  a. Enter your Nimia Access Code exactly as it appears at the top of this page. (You will not need to use this code after you’ve completed the sign-up process. If you do not sign up before the expiration date, you must request a new code.)   b. Enter your date of birth.   c. Enter your home email address.   d. Click Submit, and follow the next screen’s instructions.  3. Create a Nimia ID. This will  be your [employer] login ID and cannot be changed, so think of one that is secure and easy to remember.  4. Create a [employer] password. You can change your password at any time.  5. Enter your Password Reset Question and Answer. This can be used at a later time if you forget your password.   6. Enter your e-mail address. This allows you to receive e-mail notifications when new information is available in [employer].  7. Click Sign Up. You can now view your health information.    For assistance activating your [employer] account, call [phone]

## 2017-06-26 ENCOUNTER — TELEPHONE (OUTPATIENT)
Dept: MEDICAL GROUP | Facility: MEDICAL CENTER | Age: 27
End: 2017-06-26

## 2017-06-26 ENCOUNTER — APPOINTMENT (OUTPATIENT)
Dept: BEHAVIORAL HEALTH | Facility: PHYSICIAN GROUP | Age: 27
End: 2017-06-26
Payer: MEDICARE

## 2017-06-26 NOTE — ED NOTES
Attempted to flush catheter, able to instill 30 cc of NS, 30 cc return with resistance.  Pt states last catheter change on Friday.

## 2017-06-26 NOTE — TELEPHONE ENCOUNTER
1. Caller Name: Kristin Balderrama                                           Call Back Number: [phone] (home)         Patient approves a detailed voicemail message: N\A    Patient said that her blood sugar has been running 300 after taking all her medication and wanted to know what else she can do? Please advise

## 2017-06-26 NOTE — ED NOTES
Pt provided discharge instructions.  In such instructions, the patient made aware of medical diagnosis, treatment team, follow up recommendations for continuity of care, how to access [employer] health information online, information on medical prescriptions (how to take, when to take/not to take, side effects and adverse effects), and other health information pertinent to patient's diagnosis.  Patient verbalized understanding of discharge information.

## 2017-06-26 NOTE — ED PROVIDER NOTES
"CHIEF COMPLAINT  Chief Complaint   Patient presents with   • Urinary Catheter Problem     \"not draining for 2 hours, it's clogged\"       HPI  Kristin Balderrama is a 26 y.o. female who presents  With urinary catheter problem. Has a suprapubic catheter and has had it in place for several years now. Typically goes to urology clinic and has a nurse replaced the Andrade. This was last done last Friday. The patient has chronic back issues and she reports that she ambulates with a walker at baseline. The patient states that she has a suprapubic catheter due to urinary incontinence. Was placed at [facility]. She has a follow-up appointment with her urologist in [place] within the next month or so.    Patient is presenting today due to inability to drain her urinary catheter. She is on diuretics and she believes that there should be over a liter out by now. There is scant urine currently. Denies nausea, vomiting, fever. No abdominal pain.    REVIEW OF SYSTEMS  See HPI for further details. All other systems are negative.     PAST MEDICAL HISTORY   has a past medical history of Scoliosis; Multiple personality disorder; Chronic UTI (9/18/2010); Heart burn; Pain (08-15-12); History of falling; Sinus tachycardia (10/31/2013); Urinary incontinence; Hypertension; Disorder of thyroid; Obesity; Pneumonia (2012); ASTHMA; Breath shortness; Anginal syndrome; Psychosis; Arthritis; PCOS (polycystic ovarian syndrome); Gynecological disorder; Renal disorder; Arrhythmia; Urinary bladder disorder; Tuberculosis; Sleep apnea; Mitochondrial disease; and Fall.    SOCIAL HISTORY  Social History     Social History Main Topics   • Smoking status: Passive Smoke Exposure - Never Smoker     Types: Cigarettes   • Smokeless tobacco: Never Used   • Alcohol Use: No   • Drug Use: No   • Sexual Activity: Not Currently     Birth Control/ Protection: Pill       SURGICAL HISTORY   has past surgical history that includes neuro dest facet l/s w/ig sngl (4/21/2015); " "recovery (1/27/2016); delmar by laparoscopy (8/29/2010); lumbar fusion anterior (8/21/2012); other cardiac surgery (1/2016); tonsillectomy; bowel stimulator insertion (7/13/2016); gastroscopy with balloon dilatation (N/A, 1/4/2017); muscle biopsy (Right, 1/26/2017); and other abdominal surgery.    CURRENT MEDICATIONS  Home Medications     **Home medications have not yet been reviewed for this encounter**          ALLERGIES  Allergies   Allergen Reactions   • Cefdinir Shortness of Breath and Itching     Tolerated 1/18/17   • Depakote [Divalproex Sodium] Unspecified     Muscle spasms/muscle pain and weakness     • Abilify Unspecified     Headaches/muscle twitching     • Amitriptyline Unspecified     Headaches     • Amoxicillin Rash     Pt states \"I get a rash\".     • Ciprofloxacin Rash     Pt states \"I get a rash\".     • Clindamycin Nausea     Even with food     • Doxycycline Vomiting and Nausea     RXN=unknown   • Ees [Erythromycin] Vomiting and Nausea   • Flagyl [Metronidazole Hcl] Unspecified     \"eye problems\"     • Flomax [Tamsulosin Hydrochloride] Swelling   • Metformin Unspecified     Increased lactic acid      • Sulfa Drugs Hives and Rash     RXN=since childhood   • Tape Rash     Tears skin off   coban with Tegaderm tape ok  RXN=ongoing   • Vancomycin Itching     Pt becomes flushed in face and chest.   RXN=7/10/16   • Cephalexin [Keflex] Rash     Pt states she gets a rash with this medication   • Erythromycin    • Levofloxacin Unspecified     Leg muscle cramps   • Metronidazole    • Valproic Acid        PHYSICAL EXAM  VITAL SIGNS: /83 mmHg  Pulse 82  Temp(Src) 37.2 °C (99 °F) (Temporal)  Resp 18  Ht 1.6 m (5' 3\")  Wt 118 kg (260 lb 2.3 oz)  BMI 46.09 kg/m2  SpO2 95%  Pulse ox interpretation: I interpret this pulse ox as normal.  Constitutional: Alert in no apparent distress.  HENT: No signs of trauma, Bilateral external ears normal, Nose normal.   Cardiovascular: Regular rate and rhythm,  Thorax & " "Lungs:  No respiratory distress  Abdomen: Bowel sounds normal, Soft, No tenderness, No masses, No pulsatile masses. No peritoneal signs.  Suprapubic catheter in place with no surrounding erythema or purulent discharge or signs of infection.  Skin: Warm, Dry, No erythema, No rash.   Extremities: Intact distal pulses, No edema, No tenderness, No cyanosis      DIAGNOSTIC STUDIES / PROCEDURES    COURSE & MEDICAL DECISION MAKING  Pertinent Labs & Imaging studies reviewed. (See chart for details)  26 y.o.  Female presenting with urinary catheter that does not appear to be working. Attempted flushing by nursing without urine output. Andrade catheter was replaced by nursing staff without any complications. They note bladder scan was performed shortly afterwards with only a 13 mL of urine left in the bladder. It appears to be working adequately. Patient was instructed to follow up with her primary care physician and urologist for further management. The patient just had the Andrade catheter replaced on Friday however there is seems to be some sort of obstruction today. Unlikely a urinary tract infection at this time. Patient with normal vital signs, No abdominal tenderness, no fever, no vomiting.    /70 mmHg  Pulse 75  Temp(Src) 36.4 °C (97.5 °F) (Temporal)  Resp 16  Ht 1.6 m (5' 3\")  Wt 118 kg (260 lb 2.3 oz)  BMI 46.09 kg/m2  SpO2 96%     The patient was referred to primary care where they will receive further BP management.      The patient will return for worsening symptoms or failure of improvement and is stable at the time of discharge. The patient verbalizes understanding in their own words.    Torres Brody M.D.  [address]  [facility] [id number] [phone]          [facility], Emergency Dept  [address] [address] [phone]    As needed, If symptoms worsen      FINAL IMPRESSION  1. Problem with Andrade catheter, initial encounter ([facility])      "       Electronically signed by: Laron Vela, 6/25/2017 10:29 PM

## 2017-06-26 NOTE — ED NOTES
"Chief Complaint   Patient presents with   • Urinary Catheter Problem     \"not draining for 2 hours, it's clogged\"     Pt to triage via wheelchair with family for above complaint. PT states she has permanent suprapubic catheter in place for urinary incontinence, and it had not been draining for 2 hours. Pt rates bladder pain as 9/10. Pt states she is on diuretics and feels as if she needs to urinate. /83 mmHg  Pulse 82  Temp(Src) 37.2 °C (99 °F) (Temporal)  Resp 18  Ht 1.6 m (5' 3\")  Wt 118 kg (260 lb 2.3 oz)  BMI 46.09 kg/m2  SpO2 95%  Pt informed and educated on triage process and wait times. Pt verbalizes understanding to inform either triage tech or RN to alert staff for any changes in condition. Pt placed in lobby.    "

## 2017-06-26 NOTE — TELEPHONE ENCOUNTER
Future Appointments       Provider Department Center    6/26/2017 12:00 PM Blanca Brantley [facility]    6/28/2017 10:40 AM Torres Brody M.D. [facility] [address]    7/6/2017 6:45 AM [facility]     7/14/2017 10:00 AM Ronny Burnette M.D. BE[facility]IOR[facility] HE[facility] [address] [place] [address]    7/17/2017 10:00 AM Blanca Brantley [facility]    7/19/2017 8:40 AM Torres Kumar M.D. [facility]     8/11/2017 10:00 AM Sandoval Fox M.D. [facility] Neurology     8/23/2017 9:00 AM Torres Brody M.D. [place] [address]        ESTABLISHED PATIENT PRE-VISIT PLANNING     Note: Patient will not be contacted if there is no indication to call.     1.  Reviewed note from last office visit with PCP and/or other med group provider: Yes    2.  If any orders were placed at last visit, do we have Results/Consult Notes?        •  Labs - Labs were not ordered at last office visit.       •  Imaging - Imaging was not ordered at last office visit.       •  Referrals - No referrals were ordered at last office visit.    3.  Immunizations were updated in [facility] using WebIZ?: Yes       •  Web Iz Recommendations: HEPATITIS A     4.  Patient is due for the following Health Maintenance Topics:   Health Maintenance Due   Topic Date Due   • IMM HEP A VACCINE (1 of 2 - Standard Series) 10/13/1990   • IMM VARICELLA (CHICKENPOX) VACCINE (1 of 2 - 2 Dose Adolescent Series) 10/13/2002           5.  Patient was not informed to arrive 15 min prior to their scheduled appointment and bring in their medication bottles.

## 2017-06-26 NOTE — DISCHARGE INSTRUCTIONS
Andrade Catheter Care, Adult   A Andrade catheter is a soft, flexible tube that is placed into the bladder to drain urine. A Andrade catheter may be inserted if:   You leak urine or are not able to control when you urinate (urinary incontinence).   You are not able to urinate when you need to (urinary retention).   You had prostate surgery or surgery on the genitals.   You have certain medical conditions, such as multiple sclerosis, dementia, or a spinal cord injury.  If you are going home with a Andrade catheter in place, follow the instructions below.   TAKING CARE OF THE CATHETER   1. Wash your hands with soap and water.  2. Using mild soap and warm water on a clean washcloth:  Clean the area on your body closest to the catheter insertion site using a circular motion, moving away from the catheter. Never wipe toward the catheter because this could sweep bacteria up into the urethra and cause infection.   Remove all traces of soap. Pat the area dry with a clean towel. For males, reposition the foreskin.  3. Attach the catheter to your leg so there is no tension on the catheter. Use adhesive tape or a leg strap. If you are using adhesive tape, remove any sticky residue left behind by the previous tape you used.  4. Keep the drainage bag below the level of the bladder, but keep it off the floor.  5. Check throughout the day to be sure the catheter is working and urine is draining freely. Make sure the tubing does not become kinked.  6. Do not pull on the catheter or try to remove it. Pulling could damage internal tissues.  TAKING CARE OF THE DRAINAGE BAGS   You will be given two drainage bags to take home. One is a large overnight drainage bag, and the other is a smaller leg bag that fits underneath clothing. You may wear the overnight bag at any time, but you should never wear the smaller leg bag at night. Follow the instructions below for how to empty, change, and clean your drainage bags.   Emptying the Drainage Bag    You must empty your drainage bag when it is -½ full or at least 2-3 times a day.   1. Wash your hands with soap and water.  2. Keep the drainage bag below your hips, below the level of your bladder. This stops urine from going back into the tubing and into your bladder.  3. Hold the dirty bag over the toilet or a clean container.  4. Open the pour spout at the bottom of the bag and empty the urine into the toilet or container. Do not let the pour spout touch the toilet, container, or any other surface. Doing so can place bacteria on the bag, which can cause an infection.  5. Clean the pour spout with a gauze pad or cotton ball that has rubbing alcohol on it.  6. Close the pour spout.  7. Attach the bag to your leg with adhesive tape or a leg strap.  8. Wash your hands well.  Changing the Drainage Bag   Change your drainage bag once a month or sooner if it starts to smell bad or look dirty. Below are steps to follow when changing the drainage bag.   1. Wash your hands with soap and water.  2. Pinch off the rubber catheter so that urine does not spill out.  3. Disconnect the catheter tube from the drainage tube at the connection valve. Do not let the tubes touch any surface.  4. Clean the end of the catheter tube with an alcohol wipe. Use a different alcohol wipe to clean the end of the drainage tube.  5. Connect the catheter tube to the drainage tube of the clean drainage bag.  6. Attach the new bag to the leg with adhesive tape or a leg strap. Avoid attaching the new bag too tightly.  7. Wash your hands well.  Cleaning the Drainage Bag   1. Wash your hands with soap and water.  2. Wash the bag in warm, soapy water.  3. Rinse the bag thoroughly with warm water.  4. Fill the bag with a solution of white vinegar and water (1 cup vinegar to 1 qt warm water [.2 L vinegar to 1 L warm water]). Close the bag and soak it for 30 minutes in the solution.  5. Rinse the bag with warm water.  6. Hang the bag to dry with the  pour spout open and hanging downward.  7. Store the clean bag (once it is dry) in a clean plastic bag.  8. Wash your hands well.  PREVENTING INFECTION   Wash your hands before and after handling your catheter.   Take showers daily and wash the area where the catheter enters your body. Do not take baths. Replace wet leg straps with dry ones, if this applies.   Do not use powders, sprays, or lotions on the genital area. Only use creams, lotions, or ointments as directed by your caregiver.   For females, wipe from front to back after each bowel movement.   Drink enough fluids to keep your urine clear or pale yellow unless you have a fluid restriction.   Do not let the drainage bag or tubing touch or lie on the floor.   Wear cotton underwear to absorb moisture and to keep your .  SEEK MEDICAL CARE IF:   Your urine is cloudy or smells unusually bad.   Your catheter becomes clogged.   You are not draining urine into the bag or your bladder feels full.   Your catheter starts to leak.  SEEK IMMEDIATE MEDICAL CARE IF:   You have pain, swelling, redness, or pus where the catheter enters the body.   You have pain in the abdomen, legs, lower back, or bladder.   You have a fever.   You see blood fill the catheter, or your urine is pink or red.   You have nausea, vomiting, or chills.   Your catheter gets pulled out.  MAKE SURE YOU:   Understand these instructions.   Will watch your condition.   Will get help right away if you are not doing well or get worse.  This information is not intended to replace advice given to you by your health care provider. Make sure you discuss any questions you have with your health care provider.   Document Released: 12/18/2006 Document Revised: 05/04/2015 Document Reviewed: 12/09/2013   [employer] Interactive Patient Education ©2016 [employer]

## 2017-06-26 NOTE — ED NOTES
Agree with Triage, RN.  Pt states minimal drainage into urinary catheter bag from suprapubic catheter.  Pt states 18f catheter with 10 cc balloon in place.  No drainage noted around site.

## 2017-06-28 ENCOUNTER — TELEPHONE (OUTPATIENT)
Dept: MEDICAL GROUP | Facility: MEDICAL CENTER | Age: 27
End: 2017-06-28

## 2017-06-28 ENCOUNTER — OFFICE VISIT (OUTPATIENT)
Dept: MEDICAL GROUP | Facility: CLINIC | Age: 27
End: 2017-06-28
Payer: MEDICARE

## 2017-06-28 ENCOUNTER — APPOINTMENT (OUTPATIENT)
Dept: MEDICAL GROUP | Facility: MEDICAL CENTER | Age: 27
End: 2017-06-28
Payer: MEDICARE

## 2017-06-28 VITALS
SYSTOLIC BLOOD PRESSURE: 120 MMHG | TEMPERATURE: 96.6 F | WEIGHT: 259 LBS | HEART RATE: 85 BPM | BODY MASS INDEX: 45.89 KG/M2 | HEIGHT: 63 IN | OXYGEN SATURATION: 94 % | RESPIRATION RATE: 16 BRPM | DIASTOLIC BLOOD PRESSURE: 60 MMHG

## 2017-06-28 DIAGNOSIS — N39.0 CHRONIC UTI: Chronic | ICD-10-CM

## 2017-06-28 DIAGNOSIS — S91.302A OPEN WOUND OF LEFT HEEL, INITIAL ENCOUNTER: ICD-10-CM

## 2017-06-28 LAB
HBA1C MFR BLD: 5.5 % (ref ?–5.8)
INT CON NEG: NEGATIVE
INT CON POS: POSITIVE

## 2017-06-28 PROCEDURE — 83036 HEMOGLOBIN GLYCOSYLATED A1C: CPT | Performed by: FAMILY MEDICINE

## 2017-06-28 PROCEDURE — 99214 OFFICE O/P EST MOD 30 MIN: CPT | Performed by: FAMILY MEDICINE

## 2017-06-28 RX ORDER — NITROGLYCERIN 0.3 MG/1
TABLET SUBLINGUAL
Refills: 0 | COMMUNITY
Start: 2017-05-28 | End: 2018-01-09

## 2017-06-28 RX ORDER — GABAPENTIN 600 MG/1
600 TABLET, FILM COATED ORAL 3 TIMES DAILY
COMMUNITY
Start: 2017-06-19 | End: 2017-12-22 | Stop reason: CLARIF

## 2017-06-28 NOTE — MR AVS SNAPSHOT
"        Kristin Balderrama   2017 8:20 AM   Office Visit   MRN: [id number]    Department:  [facility]   Dept Phone:  [phone]    Description:  Female : 1990   Provider:  Emmy Gutierrez M.D.           Reason for Visit     Diabetes Mellitus           Allergies as of 2017     Allergen Noted Reactions    Cefdinir 2016   Shortness of Breath, Itching    Tolerated 17    Depakote [Divalproex Sodium] 2010   Unspecified    Muscle spasms/muscle pain and weakness      Abilify 2013   Unspecified    Headaches/muscle twitching      Amitriptyline 10/31/2013   Unspecified    Headaches      Amoxicillin 2010   Rash    Pt states \"I get a rash\".      Ciprofloxacin 2009   Rash    Pt states \"I get a rash\".      Clindamycin 2011   Nausea    Even with food      Doxycycline 08/15/2012   Vomiting, Nausea    RXN=unknown    Ees [Erythromycin] 2010   Vomiting, Nausea    Flagyl [Metronidazole Hcl] 2011   Unspecified    \"eye problems\"      Flomax [Tamsulosin Hydrochloride] 2009   Swelling    Metformin 2013   Unspecified    Increased lactic acid       Sulfa Drugs 2010   Hives, Rash    RXN=since childhood    Tape 08/15/2012   Rash    Tears skin off   coban with Tegaderm tape ok  RXN=ongoing    Vancomycin 07/10/2016   Itching    Pt becomes flushed in face and chest.   RXN=7/10/16    Cephalexin [Keflex] 2017   Rash    Pt states she gets a rash with this medication    Erythromycin 2017       Levofloxacin 10/27/2016   Unspecified    Leg muscle cramps    Metronidazole 2017       Valproic Acid 2017         You were diagnosed with     Uncontrolled type 2 diabetes mellitus without complication, without long-term current use of insulin (CMS-[facility])   [3549755]       Chronic UTI   [177553]       Open wound of left heel, initial encounter   [3610285]         Vital Signs     Blood Pressure Pulse Temperature Respirations Height Weight   " "120/60 mmHg 85 35.9 °C (96.6 °F) 16 1.6 m (5' 3\") 117.482 kg (259 lb)    Body Mass Index Oxygen Saturation Smoking Status             45.89 kg/m2 94% Passive Smoke Exposure - Never Smoker         Basic Information     Date Of Birth Sex Race Ethnicity Preferred Language    1990 Female White Non- English      Your appointments     Jul 06, 2017  6:45 AM   Adult Draw/Collection with [facility]   [facility] (--)    [address]  [place] [address]   [phone]            Jul 14, 2017 10:00 AM   Follow Up Med Management with Ronny Burnette M.D.   [facility] [address] [place] [address])    [address] [facility]  [address]  [place] [address]   [phone]            Jul 17, 2017 10:00 AM   Individual Therapy with Blanca Brantley L.C.S.W.   [facility] ([facility])    [address]  [address]  [place] [address] [phone]            Jul 19, 2017  8:40 AM   FOLLOW UP with Torres Kumar M.D.   [facility] (--)    [address]  [place] [address] [phone]            Aug 11, 2017 10:00 AM   Follow Up Visit with Sandoval Fox M.D.   [facility] (--)    [address], [address]  [place] [address] [phone]           You will be receiving a confirmation call a few days before your appointment from our automated call confirmation system.            Aug 23, 2017  9:00 AM   Established Patient with Torres Brody M.D.   [facility] [address]    [address]  [address]  [place] [id number] [phone]           You will be receiving a confirmation call a few days before your appointment from our automated call confirmation system.              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 Low  9/18/2010 - Present    Neurogenic bladder N31.9 Low  4/2/2011 - Present    Sinus " tachycardia (Chronic) R00.0 High  10/31/2013 - Present    Knee pain, right M25.561 Low  2/13/2014 - Present    Anxiety F41.9 Low  12/16/2014 - Present    Fatty liver disease, nonalcoholic K76.0 Low  1/19/2015 - Present    Progressive focal motor weakness R53.1 Low  6/28/2015 - Present    Acquired hypothyroidism E03.9 Low  11/23/2015 - Present    PCOS (polycystic ovarian syndrome) E28.2 Low  11/23/2015 - Present    H/O prior ablation treatment Z98.890   2/10/2016 - Present    Peripheral neuropathy (CMS-HCC) (Chronic) G62.9   3/6/2016 - Present    TONYA on CPAP G47.33, Z99.89 Low  3/7/2016 - Present    Morbidly obese (CMS-HCC) E66.01   3/7/2016 - Present    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   3/7/2016 - Present    Scoliosis M41.9   3/7/2016 - Present    GERD (gastroesophageal reflux disease) (Chronic) K21.9   3/7/2016 - Present    Vitamin D deficiency E55.9   5/21/2016 - Present    Chronic inflammatory arthritis (Chronic) M19.90 Medium  5/23/2016 - Present    Weakness of both lower extremities R29.898   6/22/2016 - Present    Elevated sedimentation rate R70.0   6/27/2016 - Present    Galactorrhea O92.6   7/22/2016 - Present    Psychosis, schizophrenia, simple (HCC) (Chronic) F20.89 Low Chronic 9/29/2016 - Present    Schizophrenia (CMS-HCC) F20.9 Low  10/27/2016 - Present    Chronic pain syndrome (Chronic) G89.4   10/27/2016 - Present    Bowel and bladder incontinence R32, R15.9   10/27/2016 - Present    Depression F32.9 Low  10/28/2016 - Present    HTN (hypertension) (Chronic) I10   11/1/2016 - Present    Hypovitaminosis D E55.9   11/29/2016 - Present    Leg weakness R29.898   1/4/2017 - Present    Weakness R53.1   1/22/2017 - Present    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 High  1/24/2017 - Present    Chronic suprapubic catheter (CMS-HCC) (Chronic) Z93.59   2/16/2017 - Present    Leg weakness, bilateral R29.898   2/22/2017 - Present    Weakness of right upper extremity R29.898   2/23/2017 - Present     Chest pain R07.9   3/30/2017 - Present    On home oxygen therapy (Chronic) Z99.81   4/15/2017 - Present    Uncontrolled type 2 diabetes mellitus without complication, without long-term current use of insulin (CMS-[facility]) E11.65   4/26/2017 - Present    Dysuria R30.0   5/9/2017 - Present    Hashimoto's encephalopathy E06.3, G93.49   5/17/2017 - Present    Rash R21   6/9/2017 - Present      Health Maintenance        Date Due Completion Dates    IMM HEP A VACCINE (1 of 2 - Standard Series) 10/13/1990 ---    IMM VARICELLA (CHICKENPOX) VACCINE (1 of 2 - 2 Dose Adolescent Series) 10/13/2002 ---    A1C SCREENING 10/6/2017 4/6/2017, 12/7/2016, 10/14/2016, 3/9/2016, 9/5/2014    FASTING LIPID PROFILE 3/7/2018 3/7/2017, 2/24/2017, 12/7/2016, 10/28/2016, 10/14/2016, 3/21/2014    URINE ACR / MICROALBUMIN 5/5/2018 5/5/2017, 3/7/2017, 12/7/2016, 10/14/2016, 7/28/2016    RETINAL SCREENING 5/23/2018 5/23/2017    DIABETES MONOFILAMENT / LE EXAM 5/23/2018 5/23/2017    SERUM CREATININE 6/21/2018 6/21/2017, 5/18/2017, 5/17/2017, 5/5/2017, 4/14/2017, 4/13/2017, 4/12/2017, 4/5/2017, 3/27/2017, 3/18/2017, 3/17/2017, 3/16/2017, 3/11/2017, 3/10/2017, 3/9/2017, 3/7/2017, 2/25/2017, 2/24/2017, 2/23/2017, 2/22/2017, 1/30/2017, 1/27/2017, 1/25/2017, 1/22/2017, 1/22/2017, 1/18/2017, 1/18/2017, 12/7/2016, 12/6/2016, 11/4/2016, 11/3/2016, 11/2/2016, 11/1/2016, 10/28/2016, 10/27/2016, 10/14/2016, 10/4/2016, 10/3/2016, 9/29/2016, 8/16/2016, 7/28/2016, 7/28/2016, 7/10/2016, 6/25/2016, 6/23/2016, 6/22/2016, 6/7/2016, 5/18/2016, 4/19/2016, 4/3/2016, 3/30/2016, 3/29/2016, 3/28/2016, 3/14/2016, 3/10/2016, 3/9/2016, 3/7/2016, 3/6/2016, 2/15/2016, 2/12/2016, 1/29/2016, 1/28/2016, 1/26/2016, 11/28/2015, 11/27/2015, 11/23/2015, 11/22/2015, 7/9/2015, 7/8/2015, 6/27/2015, 4/14/2015, 3/23/2015, 2/18/2015, 10/27/2014, 9/5/2014, 3/21/2014, 12/24/2013, 1/12/2013, 8/24/2012, 8/23/2012, 8/22/2012, 8/15/2012, 4/21/2012, 4/20/2012, 4/19/2012, 9/17/2011, 4/3/2011,  "4/2/2011, 3/31/2011, 9/20/2010, 9/19/2010, 9/18/2010, 8/28/2010, 7/20/2010, 1/30/2007    PAP SMEAR 7/22/2019 7/22/2016 (Postponed), 2/19/2013 (N/S)    Override on 7/22/2016: Postponed (per pt was told could \"skip\" 2016)    Override on 2/19/2013: (N/S) ([facility])    IMM DTaP/Tdap/Td Vaccine (7 - Td) 8/6/2022 8/6/2012, 9/18/2010, 3/3/1994, 5/17/1991, 5/10/1990, 3/23/1990, 1990    COLONOSCOPY 9/25/2023 9/25/2013            Results     POCT  A1C      Component    Glycohemoglobin    5.5    Internal Control Negative    Negative    Internal Control Positive    Positive                        Current Immunizations     Dtap Vaccine 3/3/1994, 5/17/1991, 5/10/1990, 3/23/1990, 1990    HIB Vaccine(PEDVAX) 1/11/1991    HPV Quadrivalent Vaccine (GARDASIL) 10/27/2011, 5/27/2011, 3/1/2011    Hepatitis B Vaccine Non-Recombivax (Ped/Adol) 1/28/2004, 10/28/2003, 10/29/1999    Influenza TIV (IM) 9/5/2013, 12/7/2011, 11/7/2011    Influenza Vaccine Adult HD 10/5/2016    MMR Vaccine 3/3/1994, 1/11/1991    OPV - Historical Data 3/3/1994, 5/17/1991, 5/10/1990, 3/23/1990, 1990    Pneumococcal Vaccine (PCV7) Historical Data 11/8/2015    Pneumococcal polysaccharide vaccine (PPSV-23) 1/23/2017  5:35 PM    TD Vaccine 9/18/2010  4:45 PM    Tdap Vaccine 8/6/2012      Below and/or attached are the medications your provider expects you to take. Review all of your home medications and newly ordered medications with your provider and/or pharmacist. Follow medication instructions as directed by your provider and/or pharmacist. Please keep your medication list with you and share with your provider. Update the information when medications are discontinued, doses are changed, or new medications (including over-the-counter products) are added; and carry medication information at all times in the event of emergency situations     Allergies:  CEFDINIR - Shortness of Breath,Itching     DEPAKOTE - Unspecified     ABILIFY - Unspecified     " AMITRIPTYLINE - Unspecified     AMOXICILLIN - Rash     CIPROFLOXACIN - Rash     CLINDAMYCIN - Nausea     DOXYCYCLINE - Vomiting,Nausea     EES - Vomiting,Nausea     FLAGYL - Unspecified     FLOMAX - Swelling     METFORMIN - Unspecified     SULFA DRUGS - Hives,Rash     TAPE - Rash     VANCOMYCIN - Itching     CEPHALEXIN - Rash     ERYTHROMYCIN - (reactions not documented)     LEVOFLOXACIN - Unspecified     METRONIDAZOLE - (reactions not documented)     VALPROIC ACID - (reactions not documented)               Medications  Valid as of: June 28, 2017 -  8:50 AM    Generic Name Brand Name Tablet Size Instructions for use    Albuterol Sulfate (Aero Soln) albuterol 108 (90 BASE) MCG/ACT Inhale 2 Puffs by mouth every 6 hours as needed for Shortness of Breath.        Aspirin (Tablet Delayed Response) ECOTRIN 81 MG Take 1 Tab by mouth every day.        Baclofen (Tab) LIORESAL 10 MG Take 1 Tab by mouth 3 times a day.        Cranberry   Take 100,400 mg by mouth 2 times a day.        Cyanocobalamin (Tab) vitamin b12 500 MCG Take 1,000 mcg by mouth 2 times a day.        Ergocalciferol (Cap) DRISDOL 28092 UNITS Take 50,000 Units by mouth every Friday.        FentaNYL (PATCH 72 HR) DURAGESIC 25 MCG/HR Apply 1 Patch to skin as directed every 72 hours.        Fluconazole (Tab) DIFLUCAN 150 MG Take 1 Tab by mouth every day. Take on day day one and day 3.        FLUoxetine HCl (Cap) PROZAC 10 MG TAKE ONE CAPSULE BY MOUTH ONCE A DAY        Gabapentin (Once-Daily) (Tab) GRALISE 600 MG Take 600 mg by mouth.        Ivabradine HCl (Tab) CORLANOR 5 MG Take 1 Tab by mouth 2 times a day, with meals.        Lactulose (Solution) lactulose 10 GM/15ML Take 10 g by mouth 2 times a day as needed.        Levothyroxine Sodium (Tab) SYNTHROID 75 MCG Take 75 mcg by mouth every morning.        Liraglutide (Solution Pen-injector) VICTOZA 18 MG/3ML Inject 0.3 mL as instructed every day.        Melatonin (Tab) Melatonin 5 MG Take 10 mg by mouth every  bedtime.        Mirabegron (TABLET SR 24 HR) Mirabegron ER 25 MG Take 1 Tab by mouth every day.        Nitroglycerin (SL Tab) NITROSTAT 0.3 MG PLACE 1 TABLET UNDER TONGUE IF NEEDED FOR CHEST PAIN EVERY 5 MINUTES FOR 3 DOSES AS DIRECTED        Omeprazole (CAPSULE DELAYED RELEASE) PRILOSEC 20 MG Take 20 mg by mouth 2 times a day.        Oxycodone-Acetaminophen (Tab) PERCOCET-10  MG Take 2 Tabs by mouth every 6 hours as needed for Severe Pain.        Promethazine HCl (Tab) PHENERGAN 25 MG Take 1 Tab by mouth every 6 hours as needed for Nausea/Vomiting.        RaNITidine HCl (Tab) ZANTAC 150 MG Take 150 mg by mouth 2 times a day.        SITagliptin Phosphate (Tab) JANUVIA 100 MG Take 1 Tab by mouth every day.        Sodium Bicarbonate (Tab) sodium bicarbonate 325 MG Take 2 Tabs by mouth 3 times a day.        Ziprasidone HCl (Cap) GEODON 80 MG Take 160 mg by mouth every evening. Takes this at 1700 daily        .                 Medicines prescribed today were sent to:     [id number] - [place], [place] - [address] [address]    Phone: [phone] Fax: [phone]    Open 24 Hours?: No      Medication refill instructions:       If your prescription bottle indicates you have medication refills left, it is not necessary to call your provider’s office. Please contact your pharmacy and they will refill your medication.    If your prescription bottle indicates you do not have any refills left, you may request refills at any time through one of the following ways: The online [employer] system (except Urgent Care), by calling your provider’s office, or by asking your pharmacy to contact your provider’s office with a refill request. Medication refills are processed only during regular business hours and may not be available until the next business day. Your provider may request additional information or to have a follow-up visit with you prior to refilling your medication.   *Please  Note: Medication refills are assigned a new Rx number when refilled electronically. Your pharmacy may indicate that no refills were authorized even though a new prescription for the same medication is available at the pharmacy. Please request the medicine by name with the pharmacy before contacting your provider for a refill.        Other Notes About Your Plan     DME:  [facility] /  [phone] / fax [phone]              MyChart Access Code: Activation code not generated  Current MyChart Status: Active

## 2017-06-28 NOTE — TELEPHONE ENCOUNTER
If she is still receiving steroids her blood sugars will likely be leevated intermittently would continue meds unless the steroids are stopping.

## 2017-06-28 NOTE — TELEPHONE ENCOUNTER
1. Caller Name: Kristin Balderrama                                         Call Back Number: [phone] (home)       Patient approves a detailed voicemail message: yes    2. What is being requested? Patient saw Dr. Gutierrez this morning and that she stated that her A1c was in normal range and she possibly didn't need her diabetes medication.  The patient would like to know what she should do.  Please advise.

## 2017-06-28 NOTE — PROGRESS NOTES
Diabetes Focused Exam:    Chief Complaint   Patient presents with   • Diabetes Mellitus      Subjective:   HPI  Kristin Balderrama is a 26 y.o. female who presents for follow up of chronic conditions of diabetes mellitus and mild triglyceridemia. She indicates that she is feeling well and denies any symptoms referable to the above diagnoses. Specifically denies chest pain, palpitations, dyspnea, orthopnea, PND or peripheral edema. Also denies polyuria, polydipsia.  Has persistent urinary complaints with the catheter. Denies abdominal complaints, myalgias, numbness, she has persistent weakness.    Recent infections at hospital.  They are urinary.  Last one grew diphtheroids.  She will be seeing [facility] today.  She is asking them to take the catheter out.    Tore the skin of her left heel about a week ago.  Has been keeping it bandaged.  No bleeding now.  Denies pain.    The patient is taking ASA every day 81 mg and taking all other medications as prescribed. Patient denies any side effects of medication.  DM: A1c goal <7 Glucose monitoring frequency: once daily   Fasting sugars: 200s per her new meter, seems unlikely, given her Hba1c with normal creatinine. Post-prandial sugars: not checking, other than sometimes the morning one is only a few hours after eating, up to 300 per this meter.  Hypoglycemic episodes a few she says  Diabetic complications: none  ACR Albumin/Creatinine Ratio goal <30  HTN: Blood pressure goal <140/<80. Currently Rx ACE/ARB: No  Hyperlipidemia:Cholesterol goal LDL <100, total/HDL <5. Currently Rx Statin: No  Last eye exam 5/2017   Denies visual blurring, double vision, eye pain and floaters  Last monofilament foot exam: 5/2017  Denies foot pain, numbness, calluses, ulcers.  Has a tear on the heel.      See medications and orders placed in encounter report.  Past medical history, family history, social history reviewed and updated as documented in medical record.  Current medications  including changes today:  Current Outpatient Prescriptions   Medication Sig Dispense Refill   • GRALISE 600 MG Tab Take 600 mg by mouth.     • nitroGLYCERIN (NITROSTAT) 0.3 MG SL tablet PLACE 1 TABLET UNDER TONGUE IF NEEDED FOR CHEST PAIN EVERY 5 MINUTES FOR 3 DOSES AS DIRECTED  0   • fluconazole (DIFLUCAN) 150 MG tablet Take 1 Tab by mouth every day. Take on day day one and day 3. 2 Tab 0   • sitagliptin (JANUVIA) 100 MG Tab Take 1 Tab by mouth every day. 30 Tab 6   • Mirabegron ER (MYRBETRIQ) 25 MG TABLET SR 24 HR Take 1 Tab by mouth every day.     • oxycodone-acetaminophen (PERCOCET-10)  MG Tab Take 2 Tabs by mouth every 6 hours as needed for Severe Pain.     • promethazine (PHENERGAN) 25 MG Tab Take 1 Tab by mouth every 6 hours as needed for Nausea/Vomiting. 30 Tab 6   • aspirin EC (ECOTRIN) 81 MG Tablet Delayed Response Take 1 Tab by mouth every day. 30 Tab 6   • liraglutide (VICTOZA) 18 MG/3ML Solution Pen-injector injection Inject 0.3 mL as instructed every day. 3 PEN 11   • baclofen (LIORESAL) 10 MG Tab Take 1 Tab by mouth 3 times a day. 90 Tab 6   • CRANBERRY PO Take 100,400 mg by mouth 2 times a day.     • ivabradine (CORLANOR) 5 MG Tab tablet Take 1 Tab by mouth 2 times a day, with meals. 60 Tab 6   • fluoxetine (PROZAC) 10 MG Cap TAKE ONE CAPSULE BY MOUTH ONCE A DAY 30 Cap 2   • ranitidine (ZANTAC) 150 MG Tab Take 150 mg by mouth 2 times a day.     • Melatonin 5 MG Tab Take 10 mg by mouth every bedtime.     • ziprasidone (GEODON) 80 MG Cap Take 160 mg by mouth every evening. Takes this at 1700 daily     • fentanyl (DURAGESIC) 25 MCG/HR PATCH 72 HR Apply 1 Patch to skin as directed every 72 hours.     • lactulose 10 GM/15ML Solution Take 10 g by mouth 2 times a day as needed.     • vitamin D, Ergocalciferol, (DRISDOL) 67177 UNITS Cap capsule Take 50,000 Units by mouth every Friday.     • albuterol 108 (90 BASE) MCG/ACT Aero Soln inhalation aerosol Inhale 2 Puffs by mouth every 6 hours as needed for  "Shortness of Breath.     • cyanocobalamin (HM VITAMIN B12) 500 MCG tablet Take 1,000 mcg by mouth 2 times a day.     • sodium bicarbonate 325 MG Tab Take 2 Tabs by mouth 3 times a day.     • levothyroxine (SYNTHROID) 75 MCG Tab Take 75 mcg by mouth every morning.     • omeprazole (PRILOSEC) 20 MG delayed-release capsule Take 20 mg by mouth 2 times a day.       No current facility-administered medications for this visit.     Allergies:   Allergies   Allergen Reactions   • Cefdinir Shortness of Breath and Itching     Tolerated 1/18/17   • Depakote [Divalproex Sodium] Unspecified     Muscle spasms/muscle pain and weakness     • Abilify Unspecified     Headaches/muscle twitching     • Amitriptyline Unspecified     Headaches     • Amoxicillin Rash     Pt states \"I get a rash\".     • Ciprofloxacin Rash     Pt states \"I get a rash\".     • Clindamycin Nausea     Even with food     • Doxycycline Vomiting and Nausea     RXN=unknown   • Ees [Erythromycin] Vomiting and Nausea   • Flagyl [Metronidazole Hcl] Unspecified     \"eye problems\"     • Flomax [Tamsulosin Hydrochloride] Swelling   • Metformin Unspecified     Increased lactic acid      • Sulfa Drugs Hives and Rash     RXN=since childhood   • Tape Rash     Tears skin off   coban with Tegaderm tape ok  RXN=ongoing   • Vancomycin Itching     Pt becomes flushed in face and chest.   RXN=7/10/16   • Cephalexin [Keflex] Rash     Pt states she gets a rash with this medication   • Erythromycin    • Levofloxacin Unspecified     Leg muscle cramps   • Metronidazole    • Valproic Acid       Social History   Substance Use Topics   • Smoking status: Passive Smoke Exposure - Never Smoker     Types: Cigarettes   • Smokeless tobacco: Never Used   • Alcohol Use: No     Exercise: she is very limited  Health Maintenance/Immunizations: discussed, needs hep A.    ROS  Pertinent  ROS findings as above. All other systems reviewed and are negative.      Objective:     OBJECTIVE:  /60 mmHg  " "Pulse 85  Temp(Src) 35.9 °C (96.6 °F)  Resp 16  Ht 1.6 m (5' 3\")  Wt 117.482 kg (259 lb)  BMI 45.89 kg/m2  SpO2 94% Body mass index is 45.89 kg/(m^2). BMI: severely obese, using her walker  General: No apparent distress, conversant, cooperative and pleasant with the examination.  Psych: Alert and oriented x4, judgment and insight normal  Neck: No JVD or bruits, no adenopathy, supple  Thyroid: normal to inspection and palpation  Lungs: negative findings: normal respiratory rate and rhythm, lungs clear to auscultation  Heart: negative findings: regular rate and rhythm, S1 normal, S2 normal, no murmurs, clicks, or gallops  Abdomen: Soft, nontender, no hepatosplenomegaly or masses, normal bowel sounds  Skin: No rashes, no cyanosis, no lesions or ulcers  Extremities: No cyanosis clubbing or edema.   Feet are examined  Left heel has a region of torn skin, about 2 by 11mm, no surrounding erythema or edema.  Region is shallow but still open.  She will keep the bandage on.     POC labs:   Lab Results   Component Value Date/Time    GLUCOSE - ACCU-* 05/22/2017 10:26 AM          Last labs    Lab Results   Component Value Date/Time    CHOLESTEROL, 03/07/2017 08:10 AM    LDL 48 03/07/2017 08:10 AM    HDL 80 03/07/2017 08:10 AM    TRIGLYCERIDES 209* 03/07/2017 08:10 AM       Lab Results   Component Value Date/Time    SODIUM 140 06/21/2017 02:40 AM    POTASSIUM 4.0 06/21/2017 02:40 AM    GLUCOSE 116* 06/21/2017 02:40 AM    BUN-CREATININE RATIO 19 07/20/2010 11:00 AM    GLOM FILT RATE, EST >59 07/20/2010 11:00 AM     Lab Results   Component Value Date/Time    GLYCOHEMOGLOBIN 7.2 04/06/2017 02:41 PM    GLYCOHEMOGLOBIN 5.8* 12/07/2016 07:14 AM    GLYCOHEMOGLOBIN 5.8* 10/14/2016 07:19 AM     No results found for: MALBCRT, MICROALBUR, MICRALB, UMICROALBUM, MICROALBTIM       Assessment/Plan:   Medications, refills, and referrals per orders.   Encounter Diagnoses   Name Primary?   • Uncontrolled type 2 diabetes mellitus " without complication, without long-term current use of insulin (CMS-HCC)    • Chronic UTI    • Open wound of left heel, initial encounter        DM2 A1c is at goal   Patient to monitor sugars: daily frequency  Discussed diet, exercise, disease management and weight loss goals.  Discussed portion size.   Education and advise provided today:All medications, side effects and compliance (discussed carefully)  Annual eye examinations at Ophthalmology  Foot care discussed and Podiatry visits  Home glucose monitoring emphasized  Labs immediately prior to next visit  Long term diabetic complications  Low cholesterol diet, weight control and daily exercise    Followup:   Do labs and RTC August to see Dr. Brody months, sooner should new symptoms or problems arise.

## 2017-06-30 ENCOUNTER — OFFICE VISIT (OUTPATIENT)
Dept: MEDICAL GROUP | Facility: CLINIC | Age: 27
End: 2017-06-30
Payer: MEDICARE

## 2017-06-30 ENCOUNTER — HOSPITAL ENCOUNTER (EMERGENCY)
Facility: MEDICAL CENTER | Age: 27
End: 2017-06-30
Attending: EMERGENCY MEDICINE
Payer: MEDICARE

## 2017-06-30 VITALS
OXYGEN SATURATION: 97 % | HEART RATE: 68 BPM | DIASTOLIC BLOOD PRESSURE: 86 MMHG | SYSTOLIC BLOOD PRESSURE: 126 MMHG | TEMPERATURE: 97.6 F | RESPIRATION RATE: 16 BRPM

## 2017-06-30 VITALS
TEMPERATURE: 97.3 F | OXYGEN SATURATION: 98 % | RESPIRATION RATE: 16 BRPM | DIASTOLIC BLOOD PRESSURE: 89 MMHG | HEIGHT: 65 IN | SYSTOLIC BLOOD PRESSURE: 127 MMHG | BODY MASS INDEX: 43.09 KG/M2 | WEIGHT: 258.6 LBS | HEART RATE: 72 BPM

## 2017-06-30 DIAGNOSIS — R23.4 SKIN FISSURE: ICD-10-CM

## 2017-06-30 DIAGNOSIS — G89.29 OTHER CHRONIC PAIN: ICD-10-CM

## 2017-06-30 DIAGNOSIS — R06.00 DYSPNEA, UNSPECIFIED: ICD-10-CM

## 2017-06-30 DIAGNOSIS — R30.0 DYSURIA: ICD-10-CM

## 2017-06-30 DIAGNOSIS — R23.4 CRACKED SKIN ON FEET: ICD-10-CM

## 2017-06-30 LAB
APPEARANCE UR: CLEAR
BILIRUB UR QL STRIP.AUTO: NEGATIVE
COLOR UR: YELLOW
CULTURE IF INDICATED INDCX: NO UA CULTURE
GLUCOSE UR STRIP.AUTO-MCNC: NEGATIVE MG/DL
KETONES UR STRIP.AUTO-MCNC: NEGATIVE MG/DL
LEUKOCYTE ESTERASE UR QL STRIP.AUTO: NEGATIVE
MICRO URNS: NORMAL
NITRITE UR QL STRIP.AUTO: NEGATIVE
PH UR STRIP.AUTO: 5.5 [PH]
PROT UR QL STRIP: NEGATIVE MG/DL
RBC UR QL AUTO: NEGATIVE
SP GR UR STRIP.AUTO: 1.01

## 2017-06-30 PROCEDURE — 81003 URINALYSIS AUTO W/O SCOPE: CPT

## 2017-06-30 PROCEDURE — 99213 OFFICE O/P EST LOW 20 MIN: CPT | Performed by: NURSE PRACTITIONER

## 2017-06-30 PROCEDURE — 99283 EMERGENCY DEPT VISIT LOW MDM: CPT

## 2017-06-30 RX ORDER — BACITRACIN ZINC AND POLYMYXIN B SULFATE 500; 1000 [USP'U]/G; [USP'U]/G
1 OINTMENT TOPICAL 2 TIMES DAILY
Qty: 1 TUBE | Refills: 0 | Status: SHIPPED | OUTPATIENT
Start: 2017-06-30 | End: 2017-07-06

## 2017-06-30 ASSESSMENT — ENCOUNTER SYMPTOMS
CHILLS: 0
FEVER: 0

## 2017-06-30 ASSESSMENT — LIFESTYLE VARIABLES: DO YOU DRINK ALCOHOL: NO

## 2017-06-30 NOTE — ED PROVIDER NOTES
ED Provider Note    CHIEF COMPLAINT  Chief Complaint   Patient presents with   • UTI     pt states has uti that her kidneys hurt and co sob, states symptoms for one month now   • Shortness of Breath     pt states wears oxygen at 2l/min via nc at all times co increased sob for this am   • N/V       HPI  Kristin Balderrama is a 26 y.o. female who presents with complaints of flank pain, shortness of breath which she admits is chronic, she wears oxygen for this at home.  She also complains of cracked skin on her feet which she states her family has been helping her take care of.  She felt nauseated.  Patient states she was seen at [facility] yesterday, started on antibiotics for urinary tract infection.  She states medication costs 200 hours since she never filled the medication.  No cough or fever.  Patient states she has chronic pain.  Upon my arrival the bedside, patient stated she was tired and wanted to go home without further testing.  Documented vital signs are normal.  Urinalysis obtained prior to my arrival was normal.    REVIEW OF SYSTEMS    Constitutional: Fatigue  Respiratory: Chronic shortness of breath  Cardiac: No chest pain  Gastrointestinal: Patient reports recent nausea and vomiting.  Denies current vomiting however  Musculoskeletal: No acute back pain, does complain of some flank pain over the past week  Neurologic: No headache          PAST MEDICAL HISTORY  Past Medical History   Diagnosis Date   • Scoliosis    • Multiple personality disorder    • Chronic UTI 9/18/2010   • Heart burn    • Pain 08-15-12     back, 7/10   • History of falling    • Sinus tachycardia 10/31/2013   • Urinary incontinence    • Hypertension    • Disorder of thyroid    • Obesity    • Pneumonia 2012   • ASTHMA      when around smoke   • Breath shortness      with tachycardia   • Anginal syndrome      random chest pain especially with tachycardia   • Psychosis    • Arthritis      osteo   • PCOS (polycystic ovarian syndrome)    •  "Gynecological disorder      PCOS   • Renal disorder      \"kidney disease, stage 1\" nephrologist, Dr. Vallejo   • Arrhythmia      \"sinus tachycardia\", cariologist, Dr. 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   • Sleep apnea      CPAP \"pulmonary doctor took me off mid year 2016\"   • Mitochondrial disease    • Fall        FAMILY HISTORY  Family History   Problem Relation Age of Onset   • Hypertension Mother    • Sleep Apnea Mother    • Heart Disease Mother    • Other Mother      hypothryod   • Hypertension Maternal Uncle    • Heart Disease Maternal Grandmother    • Hypertension Maternal Grandmother    • Other Sister      Narcolepsy;fibromyalsia;bone;nerve   • Genitourinary () Sister      endometriosis       SOCIAL HISTORY  Social History     Social History   • Marital Status: Single     Spouse Name: N/A   • Number of Children: N/A   • Years of Education: N/A     Social History Main Topics   • Smoking status: Passive Smoke Exposure - Never Smoker     Types: Cigarettes   • Smokeless tobacco: Never Used   • Alcohol Use: No   • Drug Use: No   • Sexual Activity: Not Currently     Birth Control/ Protection: Pill     Other Topics Concern   • None     Social History Narrative    ** Merged History Encounter **            SURGICAL HISTORY  Past Surgical History   Procedure Laterality Date   • Neuro dest facet l/s w/ig sngl  4/21/2015     Performed by Reza Tabor at SUR[facility]ERY [facility]   • Recovery  1/27/2016     Procedure: CATH LAB EP STUDY WITH SINUS NODE MODIFICATION ABHINAV;  Surgeon: [facility];  Location: SURGERY PRE-POST PROC UNIT [facility];  Service:    • Katie by laparoscopy  8/29/2010     Performed by SHAYY JOHNS at SURGERY [facility]   • Lumbar fusion anterior  8/21/2012     Performed by SUSIE SAWANT at SURGERY [facility]   • Other cardiac surgery  1/2016     cardiac ablation   • Tonsillectomy  " "     tonsillectomy   • Bowel stimulator insertion  7/13/2016     Procedure: BOWEL STIMULATOR INSERTION FOR PERMANENT INTERSTIM SACRAL IMPLANT;  Surgeon: Joe Noyola M.D.;  Location: SURGERY [facility];  Service:    • Gastroscopy with balloon dilatation N/A 1/4/2017     Procedure: GASTROSCOPY WITH DILATATION;  Surgeon: Torres Vargas M.D.;  Location: SURGERY [facility];  Service:    • Muscle biopsy Right 1/26/2017     Procedure: MUSCLE BIOPSY - THIGH;  Surgeon: Isidro Vigil M.D.;  Location: SURGERY [facility];  Service:    • Other abdominal surgery         CURRENT MEDICATIONS  Home Medications     **Home medications have not yet been reviewed for this encounter**          ALLERGIES  Allergies   Allergen Reactions   • Cefdinir Shortness of Breath and Itching     Tolerated 1/18/17   • Depakote [Divalproex Sodium] Unspecified     Muscle spasms/muscle pain and weakness     • Abilify Unspecified     Headaches/muscle twitching     • Amitriptyline Unspecified     Headaches     • Amoxicillin Rash     Pt states \"I get a rash\".     • Ciprofloxacin Rash     Pt states \"I get a rash\".     • Clindamycin Nausea     Even with food     • Doxycycline Vomiting and Nausea     RXN=unknown   • Ees [Erythromycin] Vomiting and Nausea   • Flagyl [Metronidazole Hcl] Unspecified     \"eye problems\"     • Flomax [Tamsulosin Hydrochloride] Swelling   • Metformin Unspecified     Increased lactic acid      • Sulfa Drugs Hives and Rash     RXN=since childhood   • Tape Rash     Tears skin off   coban with Tegaderm tape ok  RXN=ongoing   • Vancomycin Itching     Pt becomes flushed in face and chest.   RXN=7/10/16   • Cephalexin [Keflex] Rash     Pt states she gets a rash with this medication   • Erythromycin    • Levofloxacin Unspecified     Leg muscle cramps   • Metronidazole    • Valproic Acid        PHYSICAL EXAM  VITAL SIGNS: /89 mmHg  Pulse 72  Temp(Src) 36.3 °C (97.3 °F)  Resp 16  Ht 1.651 m (5' 5\")  Wt 117.3 kg " (258 lb 9.6 oz)  BMI 43.03 kg/m2  SpO2 98%  Constitutional:  Non-toxic appearance.   HENT: No facial swelling, mucous membranes moist  Eyes: Anicteric, no conjunctivitis.     Cardiovascular: Normal heart rate, Normal rhythm  Pulmonary:  No wheezing, No rales.  Moderate air movement bilateral.  Gastrointestinal: Soft, No tenderness, No masses.  No guarding.  Skin: Warm, Dry, No cyanosis.  No cellulitis.  Linear skin crack in  both heels.  No purulent discharge, no lymphangitis  Neurologic: Speech is clear, follows commands, facial expression is symmetrical.   strength equal.  Leg strength equal  Psychiatric: Slightly anxious.  Cooperative..   Musculoskeletal: No flank tenderness    EKG/Labs  Results for orders placed or performed during the hospital encounter of 06/30/17   URINALYSIS,CULTURE IF INDICATED   Result Value Ref Range    Color Yellow     Character Clear     Specific Gravity 1.012 <1.035    Ph 5.5 5.0-8.0    Glucose Negative Negative mg/dL    Ketones Negative Negative mg/dL    Protein Negative Negative mg/dL    Bilirubin Negative Negative    Nitrite Negative Negative    Leukocyte Esterase Negative Negative    Occult Blood Negative Negative    Micro Urine Req see below     Culture Indicated No UA Culture     *Note: Due to a large number of results and/or encounters for the requested time period, some results have not been displayed. A complete set of results can be found in Results Review.             COURSE & MEDICAL DECISION MAKING  Pertinent Labs & Imaging studies reviewed. (See chart for details)  I recommended blood work, chest x-ray to the patient.  She is refusing at this time.  Patient states she feels tired, and wants to go home to take her own medication.  I have offered her medication here, she has refused.  Patient is advised to talk to her primary doctor regarding the irregular cracked skin on her feet.  No evidence of cellulitis or purulent discharge from the wounds.  I've advised this  patient her urine test is normal today.  I requested the patient provide consent to obtain her labs from [facility] from yesterday, she has refused.This patient is alert and oriented, and has demonstrated the capacity to understand the risks of leaving against my medical advice.  These risks have been explained as death or permanent disability.  The patient is aware they can return for any concerns or if they changes their mind about further testing and admission to the hospital.  The patient is aware they should follow up with their doctor as soon as possible.  No medical condition or medication appears to be clouding this patient's judgement at this time.    FINAL IMPRESSION     1. Dysuria    2. Cracked skin on feet    3. Other chronic pain    4. Dyspnea, unspecified                    Electronically signed by: Laron Dean, 6/30/2017 1:20 PM

## 2017-06-30 NOTE — ED AVS SNAPSHOT
Home Care Instructions                                                                                                                Kristin Balderrama   MRN: [id number]    Department:  [facility], Emergency Dept   Date of Visit:  6/30/2017            [facility], Emergency Dept    [address] [address]    Phone:  [phone]      You were seen by     aLron Dean M.D.      Your Diagnosis Was     Dysuria     R30.0       Follow-up Information     1. Schedule an appointment as soon as possible for a visit with Torres Brody M.D..    Specialty:  Internal Medicine    Contact information    [address]  [address]  [facility] [address] [phone]          2. Follow up with [facility], Emergency Dept.    Specialty:  Emergency Medicine    Why:  return if you change your mind about further testing or have any other concerns.  Return if your symptoms worsen.    Contact information    [address] [facility] [address] [phone]      Medication Information     Review all of your home medications and newly ordered medications with your primary doctor and/or pharmacist as soon as possible. Follow medication instructions as directed by your doctor and/or pharmacist.     Please keep your complete medication list with you and share with your physician. Update the information when medications are discontinued, doses are changed, or new medications (including over-the-counter products) are added; and carry medication information at all times in the event of emergency situations.               Medication List      ASK your doctor about these medications        Instructions    Morning Afternoon Evening Bedtime    albuterol 108 (90 BASE) MCG/ACT Aers inhalation aerosol        Inhale 2 Puffs by mouth every 6 hours as needed for Shortness of Breath.   Dose:  2 Puff                        aspirin EC 81 MG Tbec   Commonly known as:  ECOTRIN       Take 1 Tab by mouth every day.   Dose:  81 mg                        baclofen 10 MG Tabs   Commonly known as:  LIORESAL        Take 1 Tab by mouth 3 times a day.   Dose:  10 mg                        CRANBERRY PO        Take 100,400 mg by mouth 2 times a day.   Dose:  001017 mg                        fentanyl 25 MCG/HR Pt72   Commonly known as:  DURAGESIC        Apply 1 Patch to skin as directed every 72 hours.   Dose:  1 Patch                        fluconazole 150 MG tablet   Commonly known as:  DIFLUCAN        Take 1 Tab by mouth every day. Take on day day one and day 3.   Dose:  150 mg                        fluoxetine 10 MG Caps   Commonly known as:  PROZAC        TAKE ONE CAPSULE BY MOUTH ONCE A DAY                        GRALISE 600 MG Tabs   Generic drug:  Gabapentin (Once-Daily)        Take 600 mg by mouth.   Dose:  600 mg                        HM VITAMIN B12 500 MCG tablet   Generic drug:  cyanocobalamin        Take 1,000 mcg by mouth 2 times a day.   Dose:  1000 mcg                        ivabradine 5 MG Tabs tablet   Commonly known as:  CORLANOR        Take 1 Tab by mouth 2 times a day, with meals.   Dose:  5 mg                        lactulose 10 GM/15ML Soln        Take 10 g by mouth 2 times a day as needed.   Dose:  10 g                        levothyroxine 75 MCG Tabs   Commonly known as:  SYNTHROID        Take 75 mcg by mouth every morning.   Dose:  75 mcg                        liraglutide 18 MG/3ML Sopn injection   Commonly known as:  VICTOZA        Inject 0.3 mL as instructed every day.   Dose:  1.8 mg                        Melatonin 5 MG Tabs        Doctor's comments:  Takes two tabs at Bedtime (10 mg.)   Take 10 mg by mouth every bedtime.   Dose:  10 mg                        MYRBETRIQ 25 MG Tb24   Generic drug:  Mirabegron ER        Take 1 Tab by mouth every day.   Dose:  1 Tab                        nitroGLYCERIN 0.3 MG SL tablet   Commonly known as:  NITROSTAT        PLACE 1 TABLET  UNDER TONGUE IF NEEDED FOR CHEST PAIN EVERY 5 MINUTES FOR 3 DOSES AS DIRECTED                        omeprazole 20 MG delayed-release capsule   Commonly known as:  PRILOSEC        Take 20 mg by mouth 2 times a day.   Dose:  20 mg                        oxycodone-acetaminophen  MG Tabs   Commonly known as:  PERCOCET-10        Take 2 Tabs by mouth every 6 hours as needed for Severe Pain.   Dose:  2 Tab                        promethazine 25 MG Tabs   Commonly known as:  PHENERGAN        Take 1 Tab by mouth every 6 hours as needed for Nausea/Vomiting.   Dose:  25 mg                        ranitidine 150 MG Tabs   Commonly known as:  ZANTAC        Take 150 mg by mouth 2 times a day.   Dose:  150 mg                        sitagliptin 100 MG Tabs   Commonly known as:  JANUVIA        Take 1 Tab by mouth every day.   Dose:  100 mg                        sodium bicarbonate 325 MG Tabs        Take 2 Tabs by mouth 3 times a day.   Dose:  650 mg                        vitamin D (Ergocalciferol) 99233 UNITS Caps capsule   Commonly known as:  DRISDOL        Take 50,000 Units by mouth every Friday.   Dose:  14810 Units                        ziprasidone 80 MG Caps   Commonly known as:  GEODON        Take 160 mg by mouth every evening. Takes this at 1700 daily   Dose:  160 mg                                Procedures and tests performed during your visit     URINALYSIS,CULTURE IF INDICATED        Discharge Instructions       Dysuria  Dysuria is pain or discomfort while urinating. The pain or discomfort may be felt in the tube that carries urine out of the bladder (urethra) or in the surrounding tissue of the genitals. The pain may also be felt in the groin area, lower abdomen, and lower back. You may have to urinate frequently or have the sudden feeling that you have to urinate (urgency). Dysuria can affect both men and women, but is more common in women.  Dysuria can be caused by many different things, including:  · Urinary  tract infection in women.  · Infection of the kidney or bladder.  · Kidney stones or bladder stones.  · Certain sexually transmitted infections (STIs), such as chlamydia.  · Dehydration.  · Inflammation of the vagina.  · Use of certain medicines.  · Use of certain soaps or scented products that cause irritation.  HOME CARE INSTRUCTIONS  Watch your dysuria for any changes. The following actions may help to reduce any discomfort you are feeling:  · Drink enough fluid to keep your urine clear or pale yellow.  · Empty your bladder often. Avoid holding urine for long periods of time.  · After a bowel movement or urination, women should cleanse from front to back, using each tissue only once.  · Empty your bladder after sexual intercourse.  · Take medicines only as directed by your health care provider.  · If you were prescribed an antibiotic medicine, finish it all even if you start to feel better.  · Avoid caffeine, tea, and alcohol. They can irritate the bladder and make dysuria worse. In men, alcohol may irritate the prostate.  · Keep all follow-up visits as directed by your health care provider. This is important.  · If you had any tests done to find the cause of dysuria, it is your responsibility to obtain your test results. Ask the lab or department performing the test when and how you will get your results. Talk with your health care provider if you have any questions about your results.  SEEK MEDICAL CARE IF:  · You develop pain in your back or sides.  · You have a fever.  · You have nausea or vomiting.  · You have blood in your urine.  · You are not urinating as often as you usually do.  SEEK IMMEDIATE MEDICAL CARE IF:  · You pain is severe and not relieved with medicines.  · You are unable to hold down any fluids.  · You or someone else notices a change in your mental function.  · You have a rapid heartbeat at rest.  · You have shaking or chills.  · You feel extremely weak.     This information is not intended  to replace advice given to you by your health care provider. Make sure you discuss any questions you have with your health care provider.     Document Released: 09/15/2005 Document Revised: 01/08/2016 Document Reviewed: 08/13/2015  [employer] Interactive Patient Education ©2016 [employer]    Shortness of Breath  Shortness of breath means you have trouble breathing. It could also mean that you have a medical problem. You should get immediate medical care for shortness of breath.  CAUSES   · Not enough oxygen in the air such as with high altitudes or a smoke-filled room.  · Certain lung diseases, infections, or problems.  · Heart disease or conditions, such as angina or heart failure.  · Low red blood cells (anemia).  · Poor physical fitness, which can cause shortness of breath when you exercise.  · Chest or back injuries or stiffness.  · Being overweight.  · Smoking.  · Anxiety, which can make you feel like you are not getting enough air.  DIAGNOSIS   Serious medical problems can often be found during your physical exam. Tests may also be done to determine why you are having shortness of breath. Tests may include:  · Chest X-rays.  · Lung function tests.  · Blood tests.  · An electrocardiogram (ECG).  · An ambulatory electrocardiogram. An ambulatory ECG records your heartbeat patterns over a 24-hour period.  · Exercise testing.  · A transthoracic echocardiogram (TTE). During echocardiography, sound waves are used to evaluate how blood flows through your heart.  · A transesophageal echocardiogram (INGE).  · Imaging scans.  Your health care provider may not be able to find a cause for your shortness of breath after your exam. In this case, it is important to have a follow-up exam with your health care provider as directed.   TREATMENT   Treatment for shortness of breath depends on the cause of your symptoms and can vary greatly.  HOME CARE INSTRUCTIONS   · Do not smoke. Smoking is a common cause of shortness of breath.  If you smoke, ask for help to quit.  · Avoid being around chemicals or things that may bother your breathing, such as paint fumes and dust.  · Rest as needed. Slowly resume your usual activities.  · If medicines were prescribed, take them as directed for the full length of time directed. This includes oxygen and any inhaled medicines.  · Keep all follow-up appointments as directed by your health care provider.  SEEK MEDICAL CARE IF:   · Your condition does not improve in the time expected.  · You have a hard time doing your normal activities even with rest.  · You have any new symptoms.  SEEK IMMEDIATE MEDICAL CARE IF:   · Your shortness of breath gets worse.  · You feel light-headed, faint, or develop a cough not controlled with medicines.  · You start coughing up blood.  · You have pain with breathing.  · You have chest pain or pain in your arms, shoulders, or abdomen.  · You have a fever.  · You are unable to walk up stairs or exercise the way you normally do.  MAKE SURE YOU:  · Understand these instructions.  · Will watch your condition.  · Will get help right away if you are not doing well or get worse.     This information is not intended to replace advice given to you by your health care provider. Make sure you discuss any questions you have with your health care provider.     Document Released: 09/12/2002 Document Revised: 12/23/2014 Document Reviewed: 03/04/2013  Elsevier Interactive Patient Education ©2016 [employer]            Patient Information     Patient Information    Following emergency treatment: all patient requiring follow-up care must return either to a private physician or a clinic if your condition worsens before you are able to obtain further medical attention, please return to the emergency room.     Billing Information    At [facility], we work to make the billing process streamlined for our patients.  Our Representatives are here to answer any questions you may have regarding your  hospital bill.  If you have insurance coverage and have supplied your insurance information to us, we will submit a claim to your insurer on your behalf.  Should you have any questions regarding your bill, we can be reached online or by phone as follows:  Online: You are able pay your bills online or live chat with our representatives about any billing questions you may have. We are here to help Monday - Friday from 8:00am to 7:30pm and 9:00am - 12:00pm on Saturdays.  Please visit [email or web address]  for more information.   Phone:  [phone] or [phone]    Please note that your emergency physician, surgeon, pathologist, radiologist, anesthesiologist, and other specialists are not employed by [facility] and will therefore bill separately for their services.  Please contact them directly for any questions concerning their bills at the numbers below:     Emergency Physician Services:  [phone]  [facility]:  [phone]  [facility]:  [phone]  [facility]:  [phone]    1. Your final bill may vary from the amount quoted upon discharge if all procedures are not complete at that time, or if your doctor has additional procedures of which we are not aware. You will receive an additional bill if you return to the Emergency Department at [facility] for suture removal regardless of the facility of which the sutures were placed.     2. Please arrange for settlement of this account at the emergency registration.    3. All self-pay accounts are due in full at the time of treatment.  If you are unable to meet this obligation then payment is expected within 4-5 days.     4. If you have had radiology studies (CT, X-ray, Ultrasound, MRI), you have received a preliminary result during your emergency department visit. Please contact the radiology department [phone] to receive a copy of your final result. Please discuss the  Final result with your primary physician or with the follow up physician provided.     Crisis Hotline:  [place] Crisis Hotline:  5-222-ZZZSWZY or 1-111.423.1329  [place] Crisis Hotline:    1-532.799.6905 or 573-391-4293         ED Discharge Follow Up Questions    1. In order to provide you with very good care, we would like to follow up with a phone call in the next few days.  May we have your permission to contact you?     YES /  NO    2. What is the best phone number to call you? (       )_____-__________    3. What is the best time to call you?      Morning  /  Afternoon  /  Evening                   Patient Signature:  ____________________________________________________________    Date:  ____________________________________________________________      Your appointments     Jul 06, 2017  6:45 AM   Adult Draw/Collection with [facility]   [facility] (--)    [address]  [place] [address]   [phone]            Jul 14, 2017 10:00 AM   Follow Up Med Management with Ronny Burnette M.D.   BE[facility]IORAL [facility] [address] [place] [address])    [address] [place] [address]  [address]  [place] [address]   [phone]            Jul 17, 2017 10:00 AM   Individual Therapy with Blanca Brantley L.C.S.W.   [facility]IORAL [facility]    [address]  [address]  [place] [id number] [phone]            Jul 19, 2017  8:40 AM   FOLLOW UP with Torres Kumar M.D.   [facility] (--)    [address], [facility] [address]  [place] [id number] [phone]            Aug 11, 2017 10:00 AM   Follow Up Visit with Sandoval Fox M.D.   [facility] (--)    [address], [address]  [facility] [address] [phone]           You will be receiving a confirmation call a few days before your appointment from our automated call confirmation system.            Aug 23, 2017  9:00 AM   Established Patient with Torres Brody M.D.   [facility] [address]    [address]  [place] [address]   [phone]           You will be receiving a confirmation call a few days before your appointment from our automated call confirmation system.

## 2017-06-30 NOTE — ED AVS SNAPSHOT
[employer] Access Code: Activation code not generated  Current [employer] Status: Active    [employer]  A secure, online tool to manage your health information     [employer]’s [employer]® is a secure, online tool that connects you to your personalized health information from the privacy of your home -- day or night - making it very easy for you to manage your healthcare. Once the activation process is completed, you can even access your medical information using the [employer] rocio, which is available for free in the Apple Rocio store or Google Play store.     [employer] provides the following levels of access (as shown below):   My Chart Features   [facility] Primary Care Doctor [facility]  Specialists [facility]  Urgent  Care Non-[facility]  Primary Care  Doctor   Email your healthcare team securely and privately 24/7 X X X X   Manage appointments: schedule your next appointment; view details of past/upcoming appointments X      Request prescription refills. X      View recent personal medical records, including lab and immunizations X X X X   View health record, including health history, allergies, medications X X X X   Read reports about your outpatient visits, procedures, consult and ER notes X X X X   See your discharge summary, which is a recap of your hospital and/or ER visit that includes your diagnosis, lab results, and care plan. X X       How to register for [employer]:  1. Go to  [email or web address].  2. Click on the Sign Up Now box, which takes you to the New Member Sign Up page. You will need to provide the following information:  a. Enter your [employer] Access Code exactly as it appears at the top of this page. (You will not need to use this code after you’ve completed the sign-up process. If you do not sign up before the expiration date, you must request a new code.)   b. Enter your date of birth.   c. Enter your home email address.   d. Click Submit, and follow the next screen’s instructions.  3. Create a [employer] ID. This will  be your "[employer] login ID and cannot be changed, so think of one that is secure and easy to remember.  4. Create a "[employer] password. You can change your password at any time.  5. Enter your Password Reset Question and Answer. This can be used at a later time if you forget your password.   6. Enter your e-mail address. This allows you to receive e-mail notifications when new information is available in "[employer].  7. Click Sign Up. You can now view your health information.    For assistance activating your "[employer] account, call [phone]

## 2017-06-30 NOTE — ED NOTES
Pt had UA completed yesterday at [facility] states had wbc and blood in it, was not started on any treatment for this and was told to follow up with family doctor pt states doctor prescribed medication she could not afford and did not start taking

## 2017-06-30 NOTE — ED AVS SNAPSHOT
6/30/2017    Kristin Balderrama  [address] [facility] [address]    Dear Kristin:    [facility] wants to ensure your discharge home is safe and you or your loved ones have had all of your questions answered regarding your care after you leave the hospital.    Below is a list of resources and contact information should you have any questions regarding your hospital stay, follow-up instructions, or active medical symptoms.    Questions or Concerns Regarding… Contact   Medical Questions Related to Your Discharge  (7 days a week, 8am-5pm) Contact a Nurse Care Coordinator   [phone]   Medical Questions Not Related to Your Discharge  (24 hours a day / 7 days a week)  Contact the Nurse Health Line   [phone]    Medications or Discharge Instructions Refer to your discharge packet   or contact your [facility] Primary Care Provider   [phone]   Follow-up Appointment(s) Schedule your appointment via [employer]   or contact Scheduling [phone]   Billing Review your statement via [employer]  or contact Billing [phone]   Medical Records Review your records via [employer]   or contact Medical Records [phone]     You may receive a telephone call within two days of discharge. This call is to make certain you understand your discharge instructions and have the opportunity to have any questions answered. You can also easily access your medical information, test results and upcoming appointments via the [employer] free online health management tool. You can learn more and sign up at [email or web address]. For assistance setting up your [employer] account, please call [phone].    Once again, we want to ensure your discharge home is safe and that you have a clear understanding of any next steps in your care. If you have any questions or concerns, please do not hesitate to contact us, we are here for you. Thank you for choosing [facility] for your healthcare needs.    Sincerely,    Your [facility] Healthcare Team

## 2017-06-30 NOTE — ED NOTES
Patient up to obtain urine, states difficulty obtaining clean catch, explained i w ill do a minicath

## 2017-06-30 NOTE — MR AVS SNAPSHOT
"        Kristin Armstrong Balderrama   2017 1:40 PM   Office Visit   MRN: [id number]    Department:  [facility]   Dept Phone:  [phone]    Description:  Female : 1990   Provider:  VANIA Neves           Reason for Visit     Foot Problem wound bottom of bilateral foot/ oozing / painful / x 10 days       Allergies as of 2017     Allergen Noted Reactions    Cefdinir 2016   Shortness of Breath, Itching    Tolerated 17    Depakote [Divalproex Sodium] 2010   Unspecified    Muscle spasms/muscle pain and weakness      Abilify 2013   Unspecified    Headaches/muscle twitching      Amitriptyline 10/31/2013   Unspecified    Headaches      Amoxicillin 2010   Rash    Pt states \"I get a rash\".      Ciprofloxacin 2009   Rash    Pt states \"I get a rash\".      Clindamycin 2011   Nausea    Even with food      Doxycycline 08/15/2012   Vomiting, Nausea    RXN=unknown    Ees [Erythromycin] 2010   Vomiting, Nausea    Flagyl [Metronidazole Hcl] 2011   Unspecified    \"eye problems\"      Flomax [Tamsulosin Hydrochloride] 2009   Swelling    Metformin 2013   Unspecified    Increased lactic acid       Sulfa Drugs 2010   Hives, Rash    RXN=since childhood    Tape 08/15/2012   Rash    Tears skin off   coban with Tegaderm tape ok  RXN=ongoing    Vancomycin 07/10/2016   Itching    Pt becomes flushed in face and chest.   RXN=7/10/16    Cephalexin [Keflex] 2017   Rash    Pt states she gets a rash with this medication    Erythromycin 2017       Levofloxacin 10/27/2016   Unspecified    Leg muscle cramps    Metronidazole 2017       Valproic Acid 2017         You were diagnosed with     Skin fissure   [[id number]]         Vital Signs     Blood Pressure Pulse Temperature Respirations          126/86 mmHg 68 36.4 °C (97.6 °F) 16      Oxygen Saturation Smoking Status                97% Passive Smoke Exposure - Never Smoker          "   Basic Information     Date Of Birth Sex Race Ethnicity Preferred Language    1990 Female White Non- English      Your appointments     Jul 06, 2017  6:45 AM   Adult Draw/Collection with [facility]   [facility] (--)    [address]  [place] [address]   [phone]            Jul 14, 2017 10:00 AM   Follow Up Med Management with Ronny Burnette M.D.   [facility] [address] [place] [address] ([facility])    [address] [facility]  [address]  [place] [address]   [phone]            Jul 17, 2017 10:00 AM   Individual Therapy with Blanca Brantley L.C.S.W.   [facility] [place]    [address]  [address]  [place] [address]   [phone]            Jul 19, 2017  8:40 AM   FOLLOW UP with Torres Kumar M.D.   [facility] (--)    [address]  [place] [address] [phone]            Aug 11, 2017 10:00 AM   Follow Up Visit with Sandoval Fox M.D.   [facility] (--)    [address], [address]  [place] [id number] [phone]           You will be receiving a confirmation call a few days before your appointment from our automated call confirmation system.            Aug 23, 2017  9:00 AM   Established Patient with Torres Brody M.D.   [facility] [address]    [address]  [address]  [place] [address] [phone]           You will be receiving a confirmation call a few days before your appointment from our automated call confirmation system.              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 Low  9/18/2010 - Present    Neurogenic bladder N31.9 Low  4/2/2011 - Present    Sinus tachycardia (Chronic) R00.0 High  10/31/2013 - Present    Knee pain, right M25.561 Low  2/13/2014 - Present    Anxiety F41.9 Low  12/16/2014 - Present    Fatty liver disease, nonalcoholic K76.0 Low   1/19/2015 - Present    Progressive focal motor weakness R53.1 Low  6/28/2015 - Present    Acquired hypothyroidism E03.9 Low  11/23/2015 - Present    PCOS (polycystic ovarian syndrome) E28.2 Low  11/23/2015 - Present    H/O prior ablation treatment Z98.890   2/10/2016 - Present    Peripheral neuropathy (CMS-HCC) (Chronic) G62.9   3/6/2016 - Present    TONYA on CPAP G47.33, Z99.89 Low  3/7/2016 - Present    Morbidly obese (CMS-HCC) E66.01   3/7/2016 - Present    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   3/7/2016 - Present    Scoliosis M41.9   3/7/2016 - Present    GERD (gastroesophageal reflux disease) (Chronic) K21.9   3/7/2016 - Present    Vitamin D deficiency E55.9   5/21/2016 - Present    Chronic inflammatory arthritis (Chronic) M19.90 Medium  5/23/2016 - Present    Weakness of both lower extremities R29.898   6/22/2016 - Present    Elevated sedimentation rate R70.0   6/27/2016 - Present    Galactorrhea O92.6   7/22/2016 - Present    Psychosis, schizophrenia, simple (HCC) (Chronic) F20.89 Low Chronic 9/29/2016 - Present    Schizophrenia (CMS-HCC) F20.9 Low  10/27/2016 - Present    Chronic pain syndrome (Chronic) G89.4   10/27/2016 - Present    Bowel and bladder incontinence R32, R15.9   10/27/2016 - Present    Depression F32.9 Low  10/28/2016 - Present    HTN (hypertension) (Chronic) I10   11/1/2016 - Present    Hypovitaminosis D E55.9   11/29/2016 - Present    Leg weakness R29.898   1/4/2017 - Present    Weakness R53.1   1/22/2017 - Present    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 High  1/24/2017 - Present    Chronic suprapubic catheter (CMS-HCC) (Chronic) Z93.59   2/16/2017 - Present    Leg weakness, bilateral R29.898   2/22/2017 - Present    Weakness of right upper extremity R29.898   2/23/2017 - Present    Chest pain R07.9   3/30/2017 - Present    On home oxygen therapy (Chronic) Z99.81   4/15/2017 - Present    Uncontrolled type 2 diabetes mellitus without complication, without long-term current use of  insulin (CMS-HCC) E11.65   4/26/2017 - Present    Dysuria R30.0   5/9/2017 - Present    UTI (urinary tract infection) N39.0   5/13/2017 - Present    Weakness R53.1   5/13/2017 - Present    Enterococcus faecalis infection B95.2   5/13/2017 - Present    Fall at home W19.XXXA, Y92.099   5/13/2017 - Present    Hashimoto's encephalopathy E06.3, G93.49   5/17/2017 - Present    Rash R21   6/9/2017 - Present      Health Maintenance        Date Due Completion Dates    IMM HEP A VACCINE (1 of 2 - Standard Series) 10/13/1990 ---    IMM VARICELLA (CHICKENPOX) VACCINE (1 of 2 - 2 Dose Adolescent Series) 10/13/2002 ---    A1C SCREENING 12/28/2017 6/28/2017, 4/6/2017, 12/7/2016, 10/14/2016, 3/9/2016, 9/5/2014    FASTING LIPID PROFILE 3/7/2018 3/7/2017, 2/24/2017, 12/7/2016, 10/28/2016, 10/14/2016, 3/21/2014    URINE ACR / MICROALBUMIN 5/5/2018 5/5/2017, 3/7/2017, 12/7/2016, 10/14/2016, 7/28/2016    RETINAL SCREENING 5/23/2018 5/23/2017    DIABETES MONOFILAMENT / LE EXAM 5/23/2018 5/23/2017    SERUM CREATININE 6/21/2018 6/21/2017, 5/18/2017, 5/17/2017, 5/14/2017, 5/13/2017, 5/5/2017, 4/14/2017, 4/13/2017, 4/12/2017, 4/5/2017, 3/27/2017, 3/18/2017, 3/17/2017, 3/16/2017, 3/11/2017, 3/10/2017, 3/9/2017, 3/7/2017, 2/25/2017, 2/24/2017, 2/23/2017, 2/22/2017, 1/30/2017, 1/27/2017, 1/25/2017, 1/22/2017, 1/22/2017, 1/18/2017, 1/18/2017, 12/7/2016, 12/6/2016, 11/4/2016, 11/3/2016, 11/2/2016, 11/1/2016, 10/28/2016, 10/27/2016, 10/14/2016, 10/4/2016, 10/3/2016, 9/29/2016, 8/16/2016, 7/28/2016, 7/28/2016, 7/10/2016, 6/25/2016, 6/23/2016, 6/22/2016, 6/7/2016, 5/18/2016, 4/19/2016, 4/3/2016, 3/30/2016, 3/29/2016, 3/28/2016, 3/14/2016, 3/10/2016, 3/9/2016, 3/7/2016, 3/6/2016, 2/15/2016, 2/12/2016, 1/29/2016, 1/28/2016, 1/26/2016, 11/28/2015, 11/27/2015, 11/23/2015, 11/22/2015, 7/9/2015, 7/8/2015, 6/27/2015, 4/14/2015, 3/23/2015, 2/18/2015, 10/27/2014, 9/5/2014, 3/21/2014, 12/24/2013, 1/12/2013, 8/24/2012, 8/23/2012, 8/22/2012, 8/15/2012,  "4/21/2012, 4/20/2012, 4/19/2012, 9/17/2011, 4/3/2011, 4/2/2011, 3/31/2011, 9/20/2010, 9/19/2010, 9/18/2010, 8/28/2010, 7/20/2010, 1/30/2007    PAP SMEAR 7/22/2019 7/22/2016 (Postponed), 2/19/2013 (N/S)    Override on 7/22/2016: Postponed (per pt was told could \"skip\" 2016)    Override on 2/19/2013: (N/S) ([place])    IMM DTaP/Tdap/Td Vaccine (7 - Td) 8/6/2022 8/6/2012, 9/18/2010, 3/3/1994, 5/17/1991, 5/10/1990, 3/23/1990, 1990    COLONOSCOPY 9/25/2023 9/25/2013            Current Immunizations     Dtap Vaccine 3/3/1994, 5/17/1991, 5/10/1990, 3/23/1990, 1990    HIB Vaccine(PEDVAX) 1/11/1991    HPV Quadrivalent Vaccine (GARDASIL) 10/27/2011, 5/27/2011, 3/1/2011    Hepatitis B Vaccine Non-Recombivax (Ped/Adol) 1/28/2004, 10/28/2003, 10/29/1999    Influenza TIV (IM) 9/5/2013, 12/7/2011, 11/7/2011    Influenza Vaccine Adult HD 10/5/2016    MMR Vaccine 3/3/1994, 1/11/1991    OPV - Historical Data 3/3/1994, 5/17/1991, 5/10/1990, 3/23/1990, 1990    Pneumococcal Vaccine (PCV7) Historical Data 11/8/2015    Pneumococcal polysaccharide vaccine (PPSV-23) 1/23/2017  5:35 PM, 1/14/2017    TD Vaccine 9/18/2010  4:45 PM    Tdap Vaccine 8/6/2012      Below and/or attached are the medications your provider expects you to take. Review all of your home medications and newly ordered medications with your provider and/or pharmacist. Follow medication instructions as directed by your provider and/or pharmacist. Please keep your medication list with you and share with your provider. Update the information when medications are discontinued, doses are changed, or new medications (including over-the-counter products) are added; and carry medication information at all times in the event of emergency situations     Allergies:  CEFDINIR - Shortness of Breath,Itching     DEPAKOTE - Unspecified     ABILIFY - Unspecified     AMITRIPTYLINE - Unspecified     AMOXICILLIN - Rash     CIPROFLOXACIN - Rash     CLINDAMYCIN - Nausea     " DOXYCYCLINE - Vomiting,Nausea     EES - Vomiting,Nausea     FLAGYL - Unspecified     FLOMAX - Swelling     METFORMIN - Unspecified     SULFA DRUGS - Hives,Rash     TAPE - Rash     VANCOMYCIN - Itching     CEPHALEXIN - Rash     ERYTHROMYCIN - (reactions not documented)     LEVOFLOXACIN - Unspecified     METRONIDAZOLE - (reactions not documented)     VALPROIC ACID - (reactions not documented)               Medications  Valid as of: June 30, 2017 -  2:38 PM    Generic Name Brand Name Tablet Size Instructions for use    Albuterol Sulfate (Aero Soln) albuterol 108 (90 BASE) MCG/ACT Inhale 2 Puffs by mouth every 6 hours as needed for Shortness of Breath.        Albuterol Sulfate (Aero Soln) albuterol 108 (90 BASE) MCG/ACT Inhale 2 Puffs by mouth every 6 hours as needed for Shortness of Breath.        Aspirin (Tablet Delayed Response) ECOTRIN 81 MG Take 1 Tab by mouth every day.        Aspirin (Chew Tab) ASA 81 MG Take 81 mg by mouth every day.        Bacitracin-Polymyxin B (Ointment) POLYSPORIN 500-82529 UNIT/GM Apply 1 Each to affected area(s) 2 times a day.        Baclofen (Tab) LIORESAL 10 MG Take 1 Tab by mouth 3 times a day.        Cranberry   Take 100,400 mg by mouth 2 times a day.        Cranberry (Tab) Cranberry 400 MG Take 2 Tabs by mouth every day.        Cyanocobalamin (Tab) vitamin b12 500 MCG Take 1,000 mcg by mouth 2 times a day.        Cyanocobalamin (Tab) VITAMIN B-12 500 MCG Take 1,000 mcg by mouth every day.        Ergocalciferol (Cap) DRISDOL 88178 UNITS Take 50,000 Units by mouth every Friday.        FentaNYL (PATCH 72 HR) DURAGESIC 25 MCG/HR Apply 1 Patch to skin as directed every 72 hours.        Fluconazole (Tab) DIFLUCAN 150 MG Take 1 Tab by mouth every day. Take on day day one and day 3.        FLUoxetine HCl (Cap) PROZAC 10 MG TAKE ONE CAPSULE BY MOUTH ONCE A DAY        FLUoxetine HCl (Cap) PROZAC 10 MG Take 10 mg by mouth every day.        Furosemide (Tab) LASIX 40 MG Take 40 mg by mouth every  day.        Gabapentin (Cap) NEURONTIN 300 MG Take 600 mg by mouth 3 times a day.        Gabapentin (Once-Daily) (Tab) GRALISE 600 MG Take 600 mg by mouth.        Ivabradine HCl (Tab) CORLANOR 5 MG Take 1 Tab by mouth 2 times a day, with meals.        Ivabradine HCl (Tab) CORLANOR 5 MG Take 5 mg by mouth 2 times a day, with meals.        Lactobacillus (Pack) LACTINEX/FLORANEX  Take 1 Packet by mouth 3 times a day, with meals.        Lactulose (Solution) lactulose 10 GM/15ML Take 10 g by mouth 2 times a day as needed.        Lactulose (Solution) lactulose 10 GM/15ML Take 10 g by mouth 2 times a day as needed.        Levothyroxine Sodium (Tab) SYNTHROID 75 MCG Take 75 mcg by mouth every morning.        Levothyroxine Sodium (Tab) SYNTHROID 75 MCG Take 75 mcg by mouth Every morning on an empty stomach.        Liraglutide (Solution Pen-injector) VICTOZA 18 MG/3ML Inject 0.3 mL as instructed every day.        Liraglutide (Solution Pen-injector) VICTOZA 18 MG/3ML Inject 1.2 mg as instructed every day.        Melatonin (Tab) Melatonin 5 MG Take 10 mg by mouth every bedtime.        Melatonin (Tab) Melatonin 5 MG Take 10 mg by mouth every bedtime.        Mirabegron (TABLET SR 24 HR) Mirabegron ER 25 MG Take 1 Tab by mouth every day.        Nitrofurantoin Monohyd Macro (Cap) MACROBID 100 MG Take 1 Cap by mouth 2 times a day, with meals.        Nitroglycerin (SL Tab) NITROSTAT 0.3 MG PLACE 1 TABLET UNDER TONGUE IF NEEDED FOR CHEST PAIN EVERY 5 MINUTES FOR 3 DOSES AS DIRECTED        Omeprazole (CAPSULE DELAYED RELEASE) PRILOSEC 20 MG Take 20 mg by mouth 2 times a day.        Omeprazole (CAPSULE DELAYED RELEASE) PRILOSEC 20 MG Take 20 mg by mouth 2 times a day.        Oxycodone-Acetaminophen (Tab) PERCOCET-10  MG Take 1-2 Tabs by mouth every 6 hours as needed for Severe Pain. Scheduled.        Oxycodone-Acetaminophen (Tab) PERCOCET-10  MG Take 2 Tabs by mouth every 6 hours as needed for Severe Pain.         Promethazine HCl (Tab) PHENERGAN 25 MG Take 1 Tab by mouth every 6 hours as needed for Nausea/Vomiting.        Promethazine HCl (Tab) PHENERGAN 25 MG Take 25 mg by mouth every day.        RaNITidine HCl (Tab) ZANTAC 150 MG Take 150 mg by mouth 2 times a day.        RaNITidine HCl (Tab) ZANTAC 150 MG Take 150 mg by mouth 2 times a day.        SITagliptin Phosphate (Tab) JANUVIA 100 MG Take 1 Tab by mouth every day.        Sodium Bicarbonate (Tab) sodium bicarbonate 325 MG Take 2 Tabs by mouth 3 times a day.        Sodium Bicarbonate (Tab) sodium bicarbonate 325 MG Take 650 mg by mouth 3 times a day.        Ziprasidone HCl (Cap) GEODON 80 MG Take 160 mg by mouth every evening. Takes this at 1700 daily        Ziprasidone HCl (Cap) GEODON 80 MG Take 160 mg by mouth every evening.        .                 Medicines prescribed today were sent to:     [id number] - [place], [place] - [address] [place] [address]    Phone: [phone] Fax: [phone]    Open 24 Hours?: No      Medication refill instructions:       If your prescription bottle indicates you have medication refills left, it is not necessary to call your provider’s office. Please contact your pharmacy and they will refill your medication.    If your prescription bottle indicates you do not have any refills left, you may request refills at any time through one of the following ways: The online [employer] system (except Urgent Care), by calling your provider’s office, or by asking your pharmacy to contact your provider’s office with a refill request. Medication refills are processed only during regular business hours and may not be available until the next business day. Your provider may request additional information or to have a follow-up visit with you prior to refilling your medication.   *Please Note: Medication refills are assigned a new Rx number when refilled electronically. Your pharmacy may indicate that no refills were  authorized even though a new prescription for the same medication is available at the pharmacy. Please request the medicine by name with the pharmacy before contacting your provider for a refill.        Other Notes About Your Plan     DME:  [facility] /  [phone] / fax [phone]              MyChart Access Code: Activation code not generated  Current MyChart Status: Active

## 2017-06-30 NOTE — DISCHARGE INSTRUCTIONS
Dysuria  Dysuria is pain or discomfort while urinating. The pain or discomfort may be felt in the tube that carries urine out of the bladder (urethra) or in the surrounding tissue of the genitals. The pain may also be felt in the groin area, lower abdomen, and lower back. You may have to urinate frequently or have the sudden feeling that you have to urinate (urgency). Dysuria can affect both men and women, but is more common in women.  Dysuria can be caused by many different things, including:  · Urinary tract infection in women.  · Infection of the kidney or bladder.  · Kidney stones or bladder stones.  · Certain sexually transmitted infections (STIs), such as chlamydia.  · Dehydration.  · Inflammation of the vagina.  · Use of certain medicines.  · Use of certain soaps or scented products that cause irritation.  HOME CARE INSTRUCTIONS  Watch your dysuria for any changes. The following actions may help to reduce any discomfort you are feeling:  · Drink enough fluid to keep your urine clear or pale yellow.  · Empty your bladder often. Avoid holding urine for long periods of time.  · After a bowel movement or urination, women should cleanse from front to back, using each tissue only once.  · Empty your bladder after sexual intercourse.  · Take medicines only as directed by your health care provider.  · If you were prescribed an antibiotic medicine, finish it all even if you start to feel better.  · Avoid caffeine, tea, and alcohol. They can irritate the bladder and make dysuria worse. In men, alcohol may irritate the prostate.  · Keep all follow-up visits as directed by your health care provider. This is important.  · If you had any tests done to find the cause of dysuria, it is your responsibility to obtain your test results. Ask the lab or department performing the test when and how you will get your results. Talk with your health care provider if you have any questions about your results.  SEEK MEDICAL CARE  IF:  · You develop pain in your back or sides.  · You have a fever.  · You have nausea or vomiting.  · You have blood in your urine.  · You are not urinating as often as you usually do.  SEEK IMMEDIATE MEDICAL CARE IF:  · You pain is severe and not relieved with medicines.  · You are unable to hold down any fluids.  · You or someone else notices a change in your mental function.  · You have a rapid heartbeat at rest.  · You have shaking or chills.  · You feel extremely weak.     This information is not intended to replace advice given to you by your health care provider. Make sure you discuss any questions you have with your health care provider.     Document Released: 09/15/2005 Document Revised: 01/08/2016 Document Reviewed: 08/13/2015  [employer] Patient Education ©2016 [employer]    Shortness of Breath  Shortness of breath means you have trouble breathing. It could also mean that you have a medical problem. You should get immediate medical care for shortness of breath.  CAUSES   · Not enough oxygen in the air such as with high altitudes or a smoke-filled room.  · Certain lung diseases, infections, or problems.  · Heart disease or conditions, such as angina or heart failure.  · Low red blood cells (anemia).  · Poor physical fitness, which can cause shortness of breath when you exercise.  · Chest or back injuries or stiffness.  · Being overweight.  · Smoking.  · Anxiety, which can make you feel like you are not getting enough air.  DIAGNOSIS   Serious medical problems can often be found during your physical exam. Tests may also be done to determine why you are having shortness of breath. Tests may include:  · Chest X-rays.  · Lung function tests.  · Blood tests.  · An electrocardiogram (ECG).  · An ambulatory electrocardiogram. An ambulatory ECG records your heartbeat patterns over a 24-hour period.  · Exercise testing.  · A transthoracic echocardiogram (TTE). During echocardiography, sound waves are  used to evaluate how blood flows through your heart.  · A transesophageal echocardiogram (INGE).  · Imaging scans.  Your health care provider may not be able to find a cause for your shortness of breath after your exam. In this case, it is important to have a follow-up exam with your health care provider as directed.   TREATMENT   Treatment for shortness of breath depends on the cause of your symptoms and can vary greatly.  HOME CARE INSTRUCTIONS   · Do not smoke. Smoking is a common cause of shortness of breath. If you smoke, ask for help to quit.  · Avoid being around chemicals or things that may bother your breathing, such as paint fumes and dust.  · Rest as needed. Slowly resume your usual activities.  · If medicines were prescribed, take them as directed for the full length of time directed. This includes oxygen and any inhaled medicines.  · Keep all follow-up appointments as directed by your health care provider.  SEEK MEDICAL CARE IF:   · Your condition does not improve in the time expected.  · You have a hard time doing your normal activities even with rest.  · You have any new symptoms.  SEEK IMMEDIATE MEDICAL CARE IF:   · Your shortness of breath gets worse.  · You feel light-headed, faint, or develop a cough not controlled with medicines.  · You start coughing up blood.  · You have pain with breathing.  · You have chest pain or pain in your arms, shoulders, or abdomen.  · You have a fever.  · You are unable to walk up stairs or exercise the way you normally do.  MAKE SURE YOU:  · Understand these instructions.  · Will watch your condition.  · Will get help right away if you are not doing well or get worse.     This information is not intended to replace advice given to you by your health care provider. Make sure you discuss any questions you have with your health care provider.     Document Released: 09/12/2002 Document Revised: 12/23/2014 Document Reviewed: 03/04/2013  [employer] Patient  Education ©2016 [employer]

## 2017-06-30 NOTE — PROGRESS NOTES
Chief Complaint   Patient presents with   • Foot Problem     wound bottom of bilateral foot/ oozing / painful / x 10 days        HISTORY OF PRESENT ILLNESS: Patient is a 26 y.o. female established patient who presents today due to 2 days hx of skin cut to her left heel and an healing cut to her right heel. Pt reports that she noticed the cut about two days ago to her left heel. For the right heel one, it has been a while so that she thinks that might be heeled already. She denied fever or chills. She has underlying disease of DM. She reports the concern for oozing and pain to her bilateral heel for 10 days but the cut just happens for two days. She covers the cut with gauze and she changes the dressing every day.       Patient Active Problem List    Diagnosis Date Noted   • Paraparesis of both lower limbs (CMS-HCC) 01/24/2017     Priority: High   • Sinus tachycardia 10/31/2013     Priority: High   • Chronic inflammatory arthritis 05/23/2016     Priority: Medium   • Depression 10/28/2016     Priority: Low   • Schizophrenia (CMS-HCC) 10/27/2016     Priority: Low   • Psychosis, schizophrenia, simple ([facility]) 09/29/2016     Priority: Low     Class: Chronic   • TONYA on CPAP 03/07/2016     Priority: Low   • Acquired hypothyroidism 11/23/2015     Priority: Low   • PCOS (polycystic ovarian syndrome) 11/23/2015     Priority: Low   • Progressive focal motor weakness 06/28/2015     Priority: Low   • Fatty liver disease, nonalcoholic 01/19/2015     Priority: Low   • Anxiety 12/16/2014     Priority: Low   • Knee pain, right 02/13/2014     Priority: Low   • Neurogenic bladder 04/02/2011     Priority: Low   • Chronic UTI 09/18/2010     Priority: Low   • Multiple personality disorder 09/18/2010     Priority: Low   • Rash 06/09/2017   • Hashimoto's encephalopathy 05/17/2017   • UTI (urinary tract infection) 05/13/2017   • Weakness 05/13/2017   • Enterococcus faecalis infection 05/13/2017   • Fall at home 05/13/2017   • Dysuria  05/09/2017   • Uncontrolled type 2 diabetes mellitus without complication, without long-term current use of insulin (CMS-HCC) 04/26/2017   • On home oxygen therapy 04/15/2017   • Chest pain 03/30/2017   • Weakness of right upper extremity 02/23/2017   • Leg weakness, bilateral 02/22/2017   • Chronic suprapubic catheter (CMS-HCC) 02/16/2017   • Weakness 01/22/2017   • Leg weakness 01/04/2017   • Hypovitaminosis D 11/29/2016   • HTN (hypertension) 11/01/2016   • Chronic pain syndrome 10/27/2016   • Bowel and bladder incontinence 10/27/2016   • Galactorrhea 07/22/2016   • Elevated sedimentation rate 06/27/2016   • Weakness of both lower extremities 06/22/2016   • Vitamin D deficiency 05/21/2016   • Morbidly obese (CMS-HCC) 03/07/2016   • Conversion disorder 03/07/2016   • Scoliosis 03/07/2016   • GERD (gastroesophageal reflux disease) 03/07/2016   • Peripheral neuropathy (CMS-HCC) 03/06/2016   • H/O prior ablation treatment 02/10/2016       Allergies:Cefdinir; Depakote; Abilify; Amitriptyline; Amoxicillin; Ciprofloxacin; Clindamycin; Doxycycline; Ees; Flagyl; Flomax; Metformin; Sulfa drugs; Tape; Vancomycin; Cephalexin; Erythromycin; Levofloxacin; Metronidazole; and Valproic acid    Current Outpatient Prescriptions   Medication Sig Dispense Refill   • GRALISE 600 MG Tab Take 600 mg by mouth.     • nitroGLYCERIN (NITROSTAT) 0.3 MG SL tablet PLACE 1 TABLET UNDER TONGUE IF NEEDED FOR CHEST PAIN EVERY 5 MINUTES FOR 3 DOSES AS DIRECTED  0   • fluconazole (DIFLUCAN) 150 MG tablet Take 1 Tab by mouth every day. Take on day day one and day 3. 2 Tab 0   • sitagliptin (JANUVIA) 100 MG Tab Take 1 Tab by mouth every day. 30 Tab 6   • Mirabegron ER (MYRBETRIQ) 25 MG TABLET SR 24 HR Take 1 Tab by mouth every day.     • oxycodone-acetaminophen (PERCOCET-10)  MG Tab Take 2 Tabs by mouth every 6 hours as needed for Severe Pain.     • nitrofurantoin monohydr macro (MACROBID) 100 MG Cap Take 1 Cap by mouth 2 times a day, with  meals. 8 Cap 0   • lactobacillus granules (LACTINEX/FLORANEX) Pack Take 1 Packet by mouth 3 times a day, with meals.     • oxycodone-acetaminophen (PERCOCET-10)  MG Tab Take 1-2 Tabs by mouth every 6 hours as needed for Severe Pain. Scheduled. 1 Tab 0   • omeprazole (PRILOSEC) 20 MG delayed-release capsule Take 20 mg by mouth 2 times a day.     • levothyroxine (SYNTHROID) 75 MCG Tab Take 75 mcg by mouth Every morning on an empty stomach.     • sodium bicarbonate 325 MG Tab Take 650 mg by mouth 3 times a day.     • albuterol 108 (90 BASE) MCG/ACT Aero Soln inhalation aerosol Inhale 2 Puffs by mouth every 6 hours as needed for Shortness of Breath.     • cyanocobalamin (VITAMIN B-12) 500 MCG Tab Take 1,000 mcg by mouth every day.     • promethazine (PHENERGAN) 25 MG Tab Take 25 mg by mouth every day.     • lactulose 10 GM/15ML Solution Take 10 g by mouth 2 times a day as needed.     • ziprasidone (GEODON) 80 MG Cap Take 160 mg by mouth every evening.     • aspirin (ASA) 81 MG Chew Tab chewable tablet Take 81 mg by mouth every day.     • Melatonin 5 MG Tab Take 10 mg by mouth every bedtime.     • fluoxetine (PROZAC) 10 MG Cap Take 10 mg by mouth every day.     • ivabradine (CORLANOR) 5 MG Tab tablet Take 5 mg by mouth 2 times a day, with meals.     • furosemide (LASIX) 40 MG Tab Take 40 mg by mouth every day.     • ranitidine (ZANTAC) 150 MG Tab Take 150 mg by mouth 2 times a day.     • gabapentin (NEURONTIN) 300 MG Cap Take 600 mg by mouth 3 times a day.     • liraglutide (VICTOZA) 18 MG/3ML Solution Pen-injector injection Inject 1.2 mg as instructed every day.     • Cranberry 400 MG Tab Take 2 Tabs by mouth every day.     • promethazine (PHENERGAN) 25 MG Tab Take 1 Tab by mouth every 6 hours as needed for Nausea/Vomiting. 30 Tab 6   • aspirin EC (ECOTRIN) 81 MG Tablet Delayed Response Take 1 Tab by mouth every day. 30 Tab 6   • liraglutide (VICTOZA) 18 MG/3ML Solution Pen-injector injection Inject 0.3 mL as  instructed every day. 3 PEN 11   • baclofen (LIORESAL) 10 MG Tab Take 1 Tab by mouth 3 times a day. 90 Tab 6   • CRANBERRY PO Take 100,400 mg by mouth 2 times a day.     • ivabradine (CORLANOR) 5 MG Tab tablet Take 1 Tab by mouth 2 times a day, with meals. 60 Tab 6   • fluoxetine (PROZAC) 10 MG Cap TAKE ONE CAPSULE BY MOUTH ONCE A DAY 30 Cap 2   • ranitidine (ZANTAC) 150 MG Tab Take 150 mg by mouth 2 times a day.     • Melatonin 5 MG Tab Take 10 mg by mouth every bedtime.     • ziprasidone (GEODON) 80 MG Cap Take 160 mg by mouth every evening. Takes this at 1700 daily     • fentanyl (DURAGESIC) 25 MCG/HR PATCH 72 HR Apply 1 Patch to skin as directed every 72 hours.     • lactulose 10 GM/15ML Solution Take 10 g by mouth 2 times a day as needed.     • vitamin D, Ergocalciferol, (DRISDOL) 86680 UNITS Cap capsule Take 50,000 Units by mouth every Friday.     • albuterol 108 (90 BASE) MCG/ACT Aero Soln inhalation aerosol Inhale 2 Puffs by mouth every 6 hours as needed for Shortness of Breath.     • cyanocobalamin (HM VITAMIN B12) 500 MCG tablet Take 1,000 mcg by mouth 2 times a day.     • sodium bicarbonate 325 MG Tab Take 2 Tabs by mouth 3 times a day.     • levothyroxine (SYNTHROID) 75 MCG Tab Take 75 mcg by mouth every morning.     • omeprazole (PRILOSEC) 20 MG delayed-release capsule Take 20 mg by mouth 2 times a day.       No current facility-administered medications for this visit.       Social History   Substance Use Topics   • Smoking status: Passive Smoke Exposure - Never Smoker     Types: Cigarettes   • Smokeless tobacco: Never Used   • Alcohol Use: No       Family Status   Relation Status Death Age   • Mother Alive      44 2016   • Maternal Uncle Alive    • Maternal Grandmother Alive    • Father Alive      bio father hx unknown   • Sister Alive      Family History   Problem Relation Age of Onset   • Hypertension Mother    • Sleep Apnea Mother    • Heart Disease Mother    • Other Mother      hypothryod   •  Hypertension Maternal Uncle    • Heart Disease Maternal Grandmother    • Hypertension Maternal Grandmother    • Other Sister      Narcolepsy;fibromyalsia;bone;nerve   • Genitourinary () Sister      endometriosis         ROS:  Review of Systems   Constitutional: Negative for fever and chills.   Skin:        Cut to bilateral heels.        Objective:     Blood pressure 126/86, pulse 68, temperature 36.4 °C (97.6 °F), resp. rate 16, SpO2 97 %.     Physical Exam:  Physical Exam   Constitutional: She is oriented to person, place, and time and well-developed, well-nourished, and in no distress.   HENT:   Head: Normocephalic and atraumatic.   Eyes: Conjunctivae are normal.   Neck: Neck supple. No JVD present.   Cardiovascular: Normal rate.    Pulmonary/Chest: Effort normal.   Musculoskeletal: She exhibits tenderness (to left heel).   Neurological: She is alert and oriented to person, place, and time.   Skin: Skin is warm. There is erythema (mild to left heel but there is no discharge, no ulceration, it seems to start heeling. The right heel cut has healed and closed).   About 1 cm long cut to left heel. The right heel one has healed.    Vitals reviewed.        Assessment/Plan:  1. Skin fissure  - bacitracin-polymyxin b (POLYSPORIN) 500-91359 UNIT/GM Ointment; Apply 1 Each to affected area(s) 2 times a day.  Dispense: 1 Tube; Refill: 0  - Does not appear cellulitis, no ulceration at this time. The cut is heeling. Pt is asking about wound care referral but the wound does not that bad to indicate wound care clinic referral. Pt is able to change the dressing on her own every day in the past few days for both her right and left heel cut and it is take care of well that right heel cut has healed and left heel one is healing too. Pt agrees with the plan to have antibiotic cream for now since the left one is still a little bit redness and given her DM. She is instructed to call back if worsening wound condition and then will  consider to cover with oral abx. However, she has to let us know what abx that she is currently taking first. She reports that she has a very new medication, it is an abx from [facility] but she does not remember the name. She will go home and check it and let us know the name if she does need to call back for worsening symptoms.     Differential diagnoses and indications for immediate follow-up discussed with patient.    Instructed to return to clinic or nearest emergency department for any change in condition, further concerns, or worsening of symptoms.    The patient demonstrated a good understanding and agreed with the treatment plan.

## 2017-06-30 NOTE — ED NOTES
.  Chief Complaint   Patient presents with   • UTI     pt states has uti that her kidneys hurt and co sob, states symptoms for one month now   • Shortness of Breath     pt states wears oxygen at 2l/min via nc at all times co increased sob for this am   • N/V     .Pt Informed regarding triage process and verbalized understanding to inform triage tech or RN for any changes in condition.  Placed in lobby.

## 2017-07-01 ENCOUNTER — APPOINTMENT (OUTPATIENT)
Dept: RADIOLOGY | Facility: MEDICAL CENTER | Age: 27
End: 2017-07-01
Attending: EMERGENCY MEDICINE
Payer: MEDICARE

## 2017-07-01 ENCOUNTER — HOSPITAL ENCOUNTER (EMERGENCY)
Facility: MEDICAL CENTER | Age: 27
End: 2017-07-01
Attending: EMERGENCY MEDICINE
Payer: MEDICARE

## 2017-07-01 VITALS
WEIGHT: 257.94 LBS | HEART RATE: 65 BPM | RESPIRATION RATE: 12 BRPM | TEMPERATURE: 98.4 F | HEIGHT: 65 IN | OXYGEN SATURATION: 93 % | BODY MASS INDEX: 42.98 KG/M2

## 2017-07-01 DIAGNOSIS — S86.912A STRAIN OF LEFT KNEE, INITIAL ENCOUNTER: ICD-10-CM
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
## 2017-07-01 DIAGNOSIS — S09.90XA CLOSED HEAD INJURY, INITIAL ENCOUNTER: ICD-10-CM

## 2017-07-01 PROCEDURE — 700102 HCHG RX REV CODE 250 W/ 637 OVERRIDE(OP): Performed by: EMERGENCY MEDICINE

## 2017-07-01 PROCEDURE — 73630 X-RAY EXAM OF FOOT: CPT | Mod: LT

## 2017-07-01 PROCEDURE — 73080 X-RAY EXAM OF ELBOW: CPT | Mod: RT

## 2017-07-01 PROCEDURE — 73562 X-RAY EXAM OF KNEE 3: CPT | Mod: LT

## 2017-07-01 PROCEDURE — 70450 CT HEAD/BRAIN W/O DYE: CPT

## 2017-07-01 PROCEDURE — 73502 X-RAY EXAM HIP UNI 2-3 VIEWS: CPT | Mod: LT

## 2017-07-01 PROCEDURE — A9270 NON-COVERED ITEM OR SERVICE: HCPCS | Performed by: EMERGENCY MEDICINE

## 2017-07-01 PROCEDURE — 99285 EMERGENCY DEPT VISIT HI MDM: CPT

## 2017-07-01 RX ORDER — TRAMADOL HYDROCHLORIDE 50 MG/1
100 TABLET ORAL ONCE
Status: COMPLETED | OUTPATIENT
Start: 2017-07-01 | End: 2017-07-01

## 2017-07-01 RX ORDER — TRAMADOL HYDROCHLORIDE 50 MG/1
50 TABLET ORAL EVERY 6 HOURS PRN
Qty: 21 TAB | Refills: 0 | Status: SHIPPED | OUTPATIENT
Start: 2017-07-01 | End: 2017-07-06

## 2017-07-01 RX ADMIN — TRAMADOL HYDROCHLORIDE 100 MG: 50 TABLET, COATED ORAL at 07:27

## 2017-07-01 NOTE — ED NOTES
RX  X 1 in hand.  Verbalizes understanding of DC instructions.  Released  To self per w/c, taken per w/c to lobby to ride.

## 2017-07-01 NOTE — ED NOTES
Pt pushed call light and requested pain meds, when this RN returned to room pt was sleeping soundly.

## 2017-07-01 NOTE — ED PROVIDER NOTES
ED Provider Note    Scribed for Bolivar Salcedo M.D. by Talisha Cedillo. 7/1/2017, 3:12 AM.    Primary care provider: Torres Brody M.D.  Means of arrival: Ambulance  History obtained from: Patient  History limited by: None    CHIEF COMPLAINT  Chief Complaint   Patient presents with   • GLF   • N/V   • T-5000 Extremity Pain       HPI  Kristin Balderrama is a 26 y.o. female who presents to the Emergency Department following a mechanical ground level fall.The patient states that she has a headache, nausea, and vomiting after she woke up on the floor after she fell off her wheelchair. She endorses left knee and foot pain in addition to her right arm and hip pain. The patient reports that her pain is a 9/10 in severity and she typically takes Hydrocodone or Fentanyl patches. The patient denies chest pain. It's been otherwise well recently pain currently rated as 5 out of 10 worst ambulation better with rest and elevation.    REVIEW OF SYSTEMS  Pertinent positives include ground level fall, nausea, vomiting, hip pain, leg pain, arm pain, foot pain. Pertinent negatives include no chest pain.   C     PAST MEDICAL HISTORY   has a past medical history of Scoliosis; Multiple personality disorder; Chronic UTI (9/18/2010); Heart burn; Pain (08-15-12); History of falling; Sinus tachycardia (10/31/2013); Urinary incontinence; Hypertension; Disorder of thyroid; Obesity; Pneumonia (2012); ASTHMA; Breath shortness; Anginal syndrome; Psychosis; Arthritis; PCOS (polycystic ovarian syndrome); Gynecological disorder; Renal disorder; Arrhythmia; Urinary bladder disorder; Tuberculosis; Sleep apnea; Mitochondrial disease; and Fall.    SURGICAL HISTORY   has past surgical history that includes neuro dest facet l/s w/ig sngl (4/21/2015); recovery (1/27/2016); delmar by laparoscopy (8/29/2010); lumbar fusion anterior (8/21/2012); other cardiac surgery (1/2016); tonsillectomy; bowel stimulator insertion (7/13/2016); gastroscopy with balloon  dilatation (N/A, 1/4/2017); muscle biopsy (Right, 1/26/2017); and other abdominal surgery.    SOCIAL HISTORY  Social History   Substance Use Topics   • Smoking status: Never Smoker    • Smokeless tobacco: Never Used   • Alcohol Use: No      History   Drug Use No       FAMILY HISTORY  Non-Contributory    CURRENT MEDICATIONS  Home Medications     Reviewed by Cole Garcia R.N. (Registered Nurse) on 07/01/17 at 0206  Med List Status: Not Addressed    Medication Last Dose Status    albuterol 108 (90 BASE) MCG/ACT Aero Soln inhalation aerosol 6/20/2017 Active    albuterol 108 (90 BASE) MCG/ACT Aero Soln inhalation aerosol >2 days Active    aspirin (ASA) 81 MG Chew Tab chewable tablet 5/13/2017 Active    aspirin EC (ECOTRIN) 81 MG Tablet Delayed Response 6/20/2017 Active    bacitracin-polymyxin b (POLYSPORIN) 500-58542 UNIT/GM Ointment  Active    baclofen (LIORESAL) 10 MG Tab 6/20/2017 Active    Cranberry 400 MG Tab 5/13/2017 Active    CRANBERRY PO 6/20/2017 Active    cyanocobalamin (HM VITAMIN B12) 500 MCG tablet 6/20/2017 Active    cyanocobalamin (VITAMIN B-12) 500 MCG Tab 5/13/2017 Active    fentanyl (DURAGESIC) 25 MCG/HR PATCH 72 HR 6/20/2017 Active    fluconazole (DIFLUCAN) 150 MG tablet 6/20/2017 Active    fluoxetine (PROZAC) 10 MG Cap 6/20/2017 Active    fluoxetine (PROZAC) 10 MG Cap 5/13/2017 Active    furosemide (LASIX) 40 MG Tab 5/13/2017 Active    gabapentin (NEURONTIN) 300 MG Cap 5/13/2017 Active    GRALISE 600 MG Tab  Active    ivabradine (CORLANOR) 5 MG Tab tablet 6/20/2017 Active    ivabradine (CORLANOR) 5 MG Tab tablet 5/13/2017 Active    lactobacillus granules (LACTINEX/FLORANEX) Pack  Active    lactulose 10 GM/15ML Solution 6/20/2017 Active    lactulose 10 GM/15ML Solution >2 days Active    levothyroxine (SYNTHROID) 75 MCG Tab 6/20/2017 Active    levothyroxine (SYNTHROID) 75 MCG Tab 5/13/2017 Active    liraglutide (VICTOZA) 18 MG/3ML Solution Pen-injector injection 6/20/2017 Active    liraglutide  "(VICTOZA) 18 MG/3ML Solution Pen-injector injection 5/13/2017 Active    Melatonin 5 MG Tab 6/20/2017 Active    Melatonin 5 MG Tab 5/12/2017 Active    Mirabegron ER (MYRBETRIQ) 25 MG TABLET SR 24 HR 6/20/2017 Active    nitrofurantoin monohydr macro (MACROBID) 100 MG Cap  Active    nitroGLYCERIN (NITROSTAT) 0.3 MG SL tablet  Active    omeprazole (PRILOSEC) 20 MG delayed-release capsule 6/20/2017 Active    omeprazole (PRILOSEC) 20 MG delayed-release capsule 5/13/2017 Active    oxycodone-acetaminophen (PERCOCET-10)  MG Tab  Active    oxycodone-acetaminophen (PERCOCET-10)  MG Tab 6/20/2017 Active    promethazine (PHENERGAN) 25 MG Tab 6/20/2017 Active    promethazine (PHENERGAN) 25 MG Tab 5/13/2017 Active    ranitidine (ZANTAC) 150 MG Tab 6/20/2017 Active    ranitidine (ZANTAC) 150 MG Tab 5/13/2017 Active    sitagliptin (JANUVIA) 100 MG Tab 6/20/2017 Active    sodium bicarbonate 325 MG Tab 6/20/2017 Active    sodium bicarbonate 325 MG Tab 5/13/2017 Active    vitamin D, Ergocalciferol, (DRISDOL) 12334 UNITS Cap capsule 6/20/2017 Active    ziprasidone (GEODON) 80 MG Cap 6/20/2017 Active    ziprasidone (GEODON) 80 MG Cap 5/12/2017 Active                ALLERGIES  Allergies   Allergen Reactions   • Cefdinir Shortness of Breath and Itching     Tolerated 1/18/17   • Depakote [Divalproex Sodium] Unspecified     Muscle spasms/muscle pain and weakness     • Abilify Unspecified     Headaches/muscle twitching     • Amitriptyline Unspecified     Headaches     • Amoxicillin Rash     Pt states \"I get a rash\".     • Ciprofloxacin Rash     Pt states \"I get a rash\".     • Clindamycin Nausea     Even with food     • Doxycycline Vomiting and Nausea     RXN=unknown   • Ees [Erythromycin] Vomiting and Nausea   • Flagyl [Metronidazole Hcl] Unspecified     \"eye problems\"     • Flomax [Tamsulosin Hydrochloride] Swelling   • Metformin Unspecified     Increased lactic acid      • Sulfa Drugs Hives and Rash     RXN=since childhood   • " "Tape Rash     Tears skin off   coban with Tegaderm tape ok  RXN=ongoing   • Vancomycin Itching     Pt becomes flushed in face and chest.   RXN=7/10/16   • Cephalexin [Keflex] Rash     Pt states she gets a rash with this medication   • Erythromycin    • Levofloxacin Unspecified     Leg muscle cramps   • Metronidazole    • Valproic Acid        PHYSICAL EXAM  VITAL SIGNS: Pulse 71  Temp(Src) 36.9 °C (98.4 °F)  Ht 1.651 m (5' 5\")  Wt 117 kg (257 lb 15 oz)  BMI 42.92 kg/m2  SpO2 92%  Pulse ox interpretation: I interpret this pulse ox as normal.  Constitutional: Alert and oriented x 3, no acute Distress  HEENT: Atraumatic normocephalic, minor tenderness over the left sided occiput, pupils are equal round reactive to light extraocular movements are intact. The nares is clear, external ears are normal, mouth shows moist mucous membranes  Neck: Supple, no JVD no tracheal deviation  Cardiovascular: Regular rate and rhythm no murmur rub or gallop 2+ pulses peripherally x4  Thorax & Lungs: No respiratory distress, no wheezes rales or rhonchi, No chest tenderness.   GI: Soft nontender nondistended positive bowel sounds, no peritoneal signs  : Deferred  Rectal: Deferred  Skin: Warm dry no acute rash or lesion  Musculoskeletal: Right upper extremity shows slight tenderness of the extensor aspect of the left elbow however moving the extremities full range straight normal  strength sensation right hand. Left upper extremity unremarkable right lower extremity unremarkable. The left lower extremity shows limited range of motion due to pain. Minor pain in the left knee, hip as well as the left foot. No obvious deformity normal sensation and cap refill left foot  Neurologic: Cranial nerves III through XII are grossly intact, no sensory deficit, no cerebellar dysfunction   Psychiatric: Appropriate affect for situation at this time      DIAGNOSTIC STUDIES / PROCEDURES    RADIOLOGY  CT-HEAD W/O   Final Result         1. No acute " "intracranial abnormality. No evidence of acute intracranial hemorrhage or mass lesion.               DX-ELBOW-COMPLETE 3+ RIGHT   Final Result         No acute osseous abnormality.      DX-HIP-COMPLETE - UNILATERAL 2+ LEFT   Final Result         1. No acute osseous abnormality.      DX-FOOT-COMPLETE 3+ LEFT   Final Result         No acute osseous abnormality.      DX-KNEE 3 VIEWS LEFT   Final Result         1. No acute osseous abnormality.        The radiologist's interpretation of all radiological studies have been reviewed by me.    COURSE & MEDICAL DECISION MAKING  Pertinent Labs & Imaging studies reviewed. (See chart for details)    3:12 AM - Patient seen and examined at bedside. Ordered Knee X-ray, Right Elbow X-ray, Hip X-ray, CT Head, Foot X-ray to evaluate her symptoms.     Medical Decision Making: X-ray and CAT scan as above unremarkable. Patient given 1 dose of tramadol here. She is able to ambulate. She does chronically use wheelchair however she's for transfer with no difficulties. Patient will be instructed to return for worsening pain headache nausea vomiting dizziness or confusion any other acute concerns otherwise follow-up with primary care for reevaluation in 2 days discharged home in stable condition.  Pulse 65  Temp(Src) 36.9 °C (98.4 °F)  Resp 12  Ht 1.651 m (5' 5\")  Wt 117 kg (257 lb 15 oz)  BMI 42.92 kg/m2  SpO2 93%      Torres Brody M.D.  [address]  [facility] [address]  [facility] [id number] [phone]    In 2 days      [facility], Emergency Dept  [address] [facility] [address] [phone]    If symptoms worsen      FINAL IMPRESSION  1. Fall, initial encounter    2. Closed head injury, initial encounter    3. Strain of left knee, initial encounter           This dictation has been created using voice recognition software and/or scribes. The accuracy of the dictation is limited by the abilities of the software and the expertise of the scribes. I expect there " may be some errors of grammar and possibly content. I made every attempt to manually correct the errors within my dictation. However, errors related to voice recognition software and/or scribes may still exist and should be interpreted within the appropriate context.     ITalisha (Scribe), am scribing for, and in the presence of, Bolivar Salcedo M.D..    Electronically signed by: Talisha Cedillo (Eva), 7/1/2017    IBolivar M.D. personally performed the services described in this documentation, as scribed by Talisha Cedillo in my presence, and it is both accurate and complete.    The note accurately reflects work and decisions made by me.  Bolivar Salcedo  7/1/2017  8:11 AM

## 2017-07-01 NOTE — ED NOTES
BIB EMS. Pt fell out of her wheelchair. Pt c/o pain in L knee, elbow, and hip. Pt also c/o n/v. + LOC per pt. Pt denies hitting her head.

## 2017-07-01 NOTE — ED AVS SNAPSHOT
[employer] Access Code: Activation code not generated  Current [employer] Status: Active    [employer]  A secure, online tool to manage your health information     [employer]’s [employer]® is a secure, online tool that connects you to your personalized health information from the privacy of your home -- day or night - making it very easy for you to manage your healthcare. Once the activation process is completed, you can even access your medical information using the [employer] rocio, which is available for free in the Apple Rocio store or Google Play store.     [employer] provides the following levels of access (as shown below):   My Chart Features   [facility] Primary Care Doctor [facility]  Specialists [facility]  Urgent  Care Non-[facility]  Primary Care  Doctor   Email your healthcare team securely and privately 24/7 X X X X   Manage appointments: schedule your next appointment; view details of past/upcoming appointments X      Request prescription refills. X      View recent personal medical records, including lab and immunizations X X X X   View health record, including health history, allergies, medications X X X X   Read reports about your outpatient visits, procedures, consult and ER notes X X X X   See your discharge summary, which is a recap of your hospital and/or ER visit that includes your diagnosis, lab results, and care plan. X X       How to register for [employer]:  1. Go to  [email or web address].  2. Click on the Sign Up Now box, which takes you to the New Member Sign Up page. You will need to provide the following information:  a. Enter your [employer] Access Code exactly as it appears at the top of this page. (You will not need to use this code after you’ve completed the sign-up process. If you do not sign up before the expiration date, you must request a new code.)   b. Enter your date of birth.   c. Enter your home email address.   d. Click Submit, and follow the next screen’s instructions.  3. Create a [employer] ID. This will  be your [employer] login ID and cannot be changed, so think of one that is secure and easy to remember.  4. Create a [employer] password. You can change your password at any time.  5. Enter your Password Reset Question and Answer. This can be used at a later time if you forget your password.   6. Enter your e-mail address. This allows you to receive e-mail notifications when new information is available in [employer].  7. Click Sign Up. You can now view your health information.    For assistance activating your [employer] account, call [phone]

## 2017-07-01 NOTE — ED AVS SNAPSHOT
Home Care Instructions                                                                                                                Kristin Balderrama   MRN: [id number]    Department:  [facility], Emergency Dept   Date of Visit:  7/1/2017            [facility], Emergency Dept    [address] [address]    Phone:  [phone]      You were seen by     Bolivar Salcedo M.D.      Your Diagnosis Was     Fall, initial encounter     W19.XXXA       These are the medications you received during your hospitalization from 07/01/2017 0159 to 07/01/2017 0733     Date/Time Order Dose Route Action    07/01/2017 0727 tramadol (ULTRAM) 50 MG tablet 100 mg 100 mg Oral Given      Follow-up Information     1. Follow up with Torres Brody M.D. In 2 days.    Specialty:  Internal Medicine    Contact information    [address]  [facility] [address]  [facility] [address] [phone]          2. Follow up with [facility], Emergency Dept.    Specialty:  Emergency Medicine    Why:  If symptoms worsen    Contact information    [address] [facility] [address] [phone]      Medication Information     Review all of your home medications and newly ordered medications with your primary doctor and/or pharmacist as soon as possible. Follow medication instructions as directed by your doctor and/or pharmacist.     Please keep your complete medication list with you and share with your physician. Update the information when medications are discontinued, doses are changed, or new medications (including over-the-counter products) are added; and carry medication information at all times in the event of emergency situations.               Medication List      START taking these medications        Instructions    Morning Afternoon Evening Bedtime    tramadol 50 MG Tabs   Last time this was given:  100 mg on 7/1/2017  7:27 AM   Commonly known as:  ULTRAM        Take 1 Tab by  mouth every 6 hours as needed for Moderate Pain.   Dose:  50 mg                          ASK your doctor about these medications        Instructions    Morning Afternoon Evening Bedtime    * albuterol 108 (90 BASE) MCG/ACT Aers inhalation aerosol        Inhale 2 Puffs by mouth every 6 hours as needed for Shortness of Breath.   Dose:  2 Puff                        * albuterol 108 (90 BASE) MCG/ACT Aers inhalation aerosol        Inhale 2 Puffs by mouth every 6 hours as needed for Shortness of Breath.   Dose:  2 Puff                        * aspirin 81 MG Chew chewable tablet   Commonly known as:  ASA        Take 81 mg by mouth every day.   Dose:  81 mg                        * aspirin EC 81 MG Tbec   Commonly known as:  ECOTRIN        Take 1 Tab by mouth every day.   Dose:  81 mg                        bacitracin-polymyxin b 500-53635 UNIT/GM Oint   Commonly known as:  POLYSPORIN        Apply 1 Each to affected area(s) 2 times a day.   Dose:  1 Each                        baclofen 10 MG Tabs   Commonly known as:  LIORESAL        Take 1 Tab by mouth 3 times a day.   Dose:  10 mg                        Cranberry 400 MG Tabs        Take 2 Tabs by mouth every day.   Dose:  2 Tab                        CRANBERRY PO        Take 100,400 mg by mouth 2 times a day.   Dose:  726758 mg                        fentanyl 25 MCG/HR Pt72   Commonly known as:  DURAGESIC        Apply 1 Patch to skin as directed every 72 hours.   Dose:  1 Patch                        fluconazole 150 MG tablet   Commonly known as:  DIFLUCAN        Take 1 Tab by mouth every day. Take on day day one and day 3.   Dose:  150 mg                        * fluoxetine 10 MG Caps   Commonly known as:  PROZAC        Take 10 mg by mouth every day.   Dose:  10 mg                        * fluoxetine 10 MG Caps   Commonly known as:  PROZAC        TAKE ONE CAPSULE BY MOUTH ONCE A DAY                        furosemide 40 MG Tabs   Commonly known as:  LASIX        Take 40  mg by mouth every day.   Dose:  40 mg                        gabapentin 300 MG Caps   Commonly known as:  NEURONTIN        Take 600 mg by mouth 3 times a day.   Dose:  600 mg                        GRALISE 600 MG Tabs   Generic drug:  Gabapentin (Once-Daily)        Take 600 mg by mouth.   Dose:  600 mg                        * HM VITAMIN B12 500 MCG tablet   Generic drug:  cyanocobalamin        Take 1,000 mcg by mouth 2 times a day.   Dose:  1000 mcg                        * cyanocobalamin 500 MCG Tabs   Commonly known as:  VITAMIN B-12        Take 1,000 mcg by mouth every day.   Dose:  1000 mcg                        * CORLANOR 5 MG Tabs tablet   Generic drug:  ivabradine        Take 5 mg by mouth 2 times a day, with meals.   Dose:  5 mg                        * ivabradine 5 MG Tabs tablet   Commonly known as:  CORLANOR        Take 1 Tab by mouth 2 times a day, with meals.   Dose:  5 mg                        lactobacillus granules Pack        Take 1 Packet by mouth 3 times a day, with meals.   Dose:  1 Packet                        * lactulose 10 GM/15ML Soln        Take 10 g by mouth 2 times a day as needed.   Dose:  10 g                        * lactulose 10 GM/15ML Soln        Take 10 g by mouth 2 times a day as needed.   Dose:  10 g                        * levothyroxine 75 MCG Tabs   Commonly known as:  SYNTHROID        Take 75 mcg by mouth every morning.   Dose:  75 mcg                        * levothyroxine 75 MCG Tabs   Commonly known as:  SYNTHROID        Take 75 mcg by mouth Every morning on an empty stomach.   Dose:  75 mcg                        * liraglutide 18 MG/3ML Sopn injection   Commonly known as:  VICTOZA        Inject 1.2 mg as instructed every day.   Dose:  1.2 mg                        * liraglutide 18 MG/3ML Sopn injection   Commonly known as:  VICTOZA        Inject 0.3 mL as instructed every day.   Dose:  1.8 mg                        * Melatonin 5 MG Tabs        Doctor's comments:  Takes  two tabs at Bedtime (10 mg.)   Take 10 mg by mouth every bedtime.   Dose:  10 mg                        * Melatonin 5 MG Tabs        Take 10 mg by mouth every bedtime.   Dose:  10 mg                        MYRBETRIQ 25 MG Tb24   Generic drug:  Mirabegron ER        Take 1 Tab by mouth every day.   Dose:  1 Tab                        nitrofurantoin monohydr macro 100 MG Caps   Commonly known as:  MACROBID        Take 1 Cap by mouth 2 times a day, with meals.   Dose:  100 mg                        nitroGLYCERIN 0.3 MG SL tablet   Commonly known as:  NITROSTAT        PLACE 1 TABLET UNDER TONGUE IF NEEDED FOR CHEST PAIN EVERY 5 MINUTES FOR 3 DOSES AS DIRECTED                        * omeprazole 20 MG delayed-release capsule   Commonly known as:  PRILOSEC        Take 20 mg by mouth 2 times a day.   Dose:  20 mg                        * omeprazole 20 MG delayed-release capsule   Commonly known as:  PRILOSEC        Take 20 mg by mouth 2 times a day.   Dose:  20 mg                        * oxycodone-acetaminophen  MG Tabs   Commonly known as:  PERCOCET-10        Take 2 Tabs by mouth every 6 hours as needed for Severe Pain.   Dose:  2 Tab                        * oxycodone-acetaminophen  MG Tabs   Commonly known as:  PERCOCET-10        Take 1-2 Tabs by mouth every 6 hours as needed for Severe Pain. Scheduled.   Dose:  1-2 Tab                        * promethazine 25 MG Tabs   Commonly known as:  PHENERGAN        Take 25 mg by mouth every day.   Dose:  25 mg                        * promethazine 25 MG Tabs   Commonly known as:  PHENERGAN        Take 1 Tab by mouth every 6 hours as needed for Nausea/Vomiting.   Dose:  25 mg                        * ranitidine 150 MG Tabs   Commonly known as:  ZANTAC        Take 150 mg by mouth 2 times a day.   Dose:  150 mg                        * ranitidine 150 MG Tabs   Commonly known as:  ZANTAC        Take 150 mg by mouth 2 times a day.   Dose:  150 mg                         sitagliptin 100 MG Tabs   Commonly known as:  JANUVIA        Take 1 Tab by mouth every day.   Dose:  100 mg                        * sodium bicarbonate 325 MG Tabs        Take 650 mg by mouth 3 times a day.   Dose:  650 mg                        * sodium bicarbonate 325 MG Tabs        Take 2 Tabs by mouth 3 times a day.   Dose:  650 mg                        vitamin D (Ergocalciferol) 59242 UNITS Caps capsule   Commonly known as:  DRISDOL        Take 50,000 Units by mouth every Friday.   Dose:  46135 Units                        * ziprasidone 80 MG Caps   Commonly known as:  GEODON        Take 160 mg by mouth every evening. Takes this at 1700 daily   Dose:  160 mg                        * ziprasidone 80 MG Caps   Commonly known as:  GEODON        Take 160 mg by mouth every evening.   Dose:  160 mg                        * Notice:  This list has 30 medication(s) that are the same as other medications prescribed for you. Read the directions carefully, and ask your doctor or other care provider to review them with you.         Where to Get Your Medications      You can get these medications from any pharmacy     Bring a paper prescription for each of these medications    - tramadol 50 MG Tabs            Procedures and tests performed during your visit     CT-HEAD W/O    DX-ELBOW-COMPLETE 3+ RIGHT    DX-FOOT-COMPLETE 3+ LEFT    DX-HIP-COMPLETE - UNILATERAL 2+ LEFT    DX-KNEE 3 VIEWS LEFT        Discharge Instructions                   Patient Information     Patient Information    Following emergency treatment: all patient requiring follow-up care must return either to a private physician or a clinic if your condition worsens before you are able to obtain further medical attention, please return to the emergency room.     Billing Information    At [facility], we work to make the billing process streamlined for our patients.  Our Representatives are here to answer any questions you may have regarding your hospital  bill.  If you have insurance coverage and have supplied your insurance information to us, we will submit a claim to your insurer on your behalf.  Should you have any questions regarding your bill, we can be reached online or by phone as follows:  Online: You are able pay your bills online or live chat with our representatives about any billing questions you may have. We are here to help Monday - Friday from 8:00am to 7:30pm and 9:00am - 12:00pm on Saturdays.  Please visit [email or web address]  for more information.   Phone:  [phone] or [phone]    Please note that your emergency physician, surgeon, pathologist, radiologist, anesthesiologist, and other specialists are not employed by [facility] and will therefore bill separately for their services.  Please contact them directly for any questions concerning their bills at the numbers below:     Emergency Physician Services:  [phone]  [facility]:  [phone]  [facility]:  [phone]  [facility]:  [phone]    1. Your final bill may vary from the amount quoted upon discharge if all procedures are not complete at that time, or if your doctor has additional procedures of which we are not aware. You will receive an additional bill if you return to the Emergency Department at [facility] for suture removal regardless of the facility of which the sutures were placed.     2. Please arrange for settlement of this account at the emergency registration.    3. All self-pay accounts are due in full at the time of treatment.  If you are unable to meet this obligation then payment is expected within 4-5 days.     4. If you have had radiology studies (CT, X-ray, Ultrasound, MRI), you have received a preliminary result during your emergency department visit. Please contact the radiology department [phone] to receive a copy of your final result. Please discuss the Final  result with your primary physician or with the follow up physician provided.     Crisis Hotline:  [place] Crisis Hotline:  4-278-JAFWCWD or 1-434.778.7740  [place] Crisis Hotline:    1-877.126.3592 or 566-911-0495         ED Discharge Follow Up Questions    1. In order to provide you with very good care, we would like to follow up with a phone call in the next few days.  May we have your permission to contact you?     YES /  NO    2. What is the best phone number to call you? (       )_____-__________    3. What is the best time to call you?      Morning  /  Afternoon  /  Evening                   Patient Signature:  ____________________________________________________________    Date:  ____________________________________________________________      Your appointments     Jul 06, 2017  6:45 AM   Adult Draw/Collection with [facility]   [facility] (--)    [address]  [place] [address]   [phone]            Jul 14, 2017 10:00 AM   Follow Up Med Management with Ronny Burnette M.D.   BEHAVIORAL HEALTH [address] [place] [address])    [address] [place] [address]  [address]  [place] [address]   [phone]            Jul 17, 2017 10:00 AM   Individual Therapy with Blanca Brantley L.C.S.W.   BE[facility]AVIORAL HEALTH [facility]    [address] [facility]  [address]  [place] [id number] [phone]            Jul 19, 2017  8:40 AM   FOLLOW UP with Torres Kumar M.D.   [facility] (--)    [address], [facility] [address]  [place] [id number] [phone]            Aug 11, 2017 10:00 AM   Follow Up Visit with Sandoval Fox M.D.   [facility] (--)    [address], [address]  [facility] [id number] [phone]           You will be receiving a confirmation call a few days before your appointment from our automated call confirmation system.            Aug 23, 2017  9:00 AM   Established Patient with Torres Brody M.D.   [facility] [address]    [address]  [place]  [address]  [place] [address]   [phone]           You will be receiving a confirmation call a few days before your appointment from our automated call confirmation system.

## 2017-07-01 NOTE — ED AVS SNAPSHOT
7/1/2017    Kristin Balderrama  [address] [address]    Dear Kristin:    [facility] wants to ensure your discharge home is safe and you or your loved ones have had all of your questions answered regarding your care after you leave the hospital.    Below is a list of resources and contact information should you have any questions regarding your hospital stay, follow-up instructions, or active medical symptoms.    Questions or Concerns Regarding… Contact   Medical Questions Related to Your Discharge  (7 days a week, 8am-5pm) Contact a Nurse Care Coordinator   [phone]   Medical Questions Not Related to Your Discharge  (24 hours a day / 7 days a week)  Contact the Nurse Health Line   [phone]    Medications or Discharge Instructions Refer to your discharge packet   or contact your [facility] Primary Care Provider   [phone]   Follow-up Appointment(s) Schedule your appointment via DotGT   or contact Scheduling [phone]   Billing Review your statement via DotGT  or contact Billing [phone]   Medical Records Review your records via DotGT   or contact Medical Records [phone]     You may receive a telephone call within two days of discharge. This call is to make certain you understand your discharge instructions and have the opportunity to have any questions answered. You can also easily access your medical information, test results and upcoming appointments via the DotGT free online health management tool. You can learn more and sign up at [email or web address]. For assistance setting up your DotGT account, please call [phone].    Once again, we want to ensure your discharge home is safe and that you have a clear understanding of any next steps in your care. If you have any questions or concerns, please do not hesitate to contact us, we are here for you. Thank you for choosing [facility] for your healthcare needs.    Sincerely,    Your [facility] Healthcare Team

## 2017-07-04 ENCOUNTER — OFFICE VISIT (OUTPATIENT)
Dept: URGENT CARE | Facility: CLINIC | Age: 27
End: 2017-07-04
Payer: MEDICARE

## 2017-07-04 VITALS
HEART RATE: 80 BPM | TEMPERATURE: 97.9 F | DIASTOLIC BLOOD PRESSURE: 80 MMHG | BODY MASS INDEX: 45.89 KG/M2 | OXYGEN SATURATION: 95 % | WEIGHT: 259 LBS | SYSTOLIC BLOOD PRESSURE: 130 MMHG | RESPIRATION RATE: 18 BRPM | HEIGHT: 63 IN

## 2017-07-04 DIAGNOSIS — H10.33 ACUTE BACTERIAL CONJUNCTIVITIS OF BOTH EYES: ICD-10-CM

## 2017-07-04 DIAGNOSIS — H92.02 PAIN IN LEFT EAR: ICD-10-CM

## 2017-07-04 PROCEDURE — 99214 OFFICE O/P EST MOD 30 MIN: CPT | Performed by: PHYSICIAN ASSISTANT

## 2017-07-04 RX ORDER — TOBRAMYCIN 3 MG/ML
2 SOLUTION/ DROPS OPHTHALMIC EVERY 4 HOURS
Qty: 1 BOTTLE | Refills: 0 | Status: SHIPPED | OUTPATIENT
Start: 2017-07-04 | End: 2017-07-06

## 2017-07-04 RX ORDER — CEFDINIR 300 MG/1
300 CAPSULE ORAL 2 TIMES DAILY
Qty: 20 CAP | Refills: 0 | Status: SHIPPED | OUTPATIENT
Start: 2017-07-04 | End: 2017-07-06

## 2017-07-04 NOTE — MR AVS SNAPSHOT
"        Kristin Armstrong Balderrama   2017 9:30 AM   Office Visit   MRN: [id number]    Department:  [facility]   Dept Phone:  [phone]    Description:  Female : 1990   Provider:  Vance Lazo PA-C           Reason for Visit     Conjunctivitis x this morning/ both eyes/ left ear pain      Allergies as of 2017     Allergen Noted Reactions    Cefdinir 2016   Shortness of Breath, Itching    Tolerated 17    Depakote [Divalproex Sodium] 2010   Unspecified    Muscle spasms/muscle pain and weakness      Abilify 2013   Unspecified    Headaches/muscle twitching      Amitriptyline 10/31/2013   Unspecified    Headaches      Amoxicillin 2010   Rash    Pt states \"I get a rash\".      Ciprofloxacin 2009   Rash    Pt states \"I get a rash\".      Clindamycin 2011   Nausea    Even with food      Doxycycline 08/15/2012   Vomiting, Nausea    RXN=unknown    Ees [Erythromycin] 2010   Vomiting, Nausea    Flagyl [Metronidazole Hcl] 2011   Unspecified    \"eye problems\"      Flomax [Tamsulosin Hydrochloride] 2009   Swelling    Metformin 2013   Unspecified    Increased lactic acid       Sulfa Drugs 2010   Hives, Rash    RXN=since childhood    Tape 08/15/2012   Rash    Tears skin off   coban with Tegaderm tape ok  RXN=ongoing    Vancomycin 07/10/2016   Itching    Pt becomes flushed in face and chest.   RXN=7/10/16    Cephalexin [Keflex] 2017   Rash    Pt states she gets a rash with this medication    Erythromycin 2017       Levofloxacin 10/27/2016   Unspecified    Leg muscle cramps    Metronidazole 2017       Valproic Acid 2017         You were diagnosed with     Acute bacterial conjunctivitis of both eyes   [3455792]       Pain in left ear   [874958]         Vital Signs     Blood Pressure Pulse Temperature Respirations Height Weight    130/80 mmHg 80 36.6 °C (97.9 °F) 18 1.6 m (5' 2.99\") 117.482 kg (259 lb)    Body Mass " Index Oxygen Saturation Breastfeeding? Smoking Status          45.89 kg/m2 95% No Never Smoker         Basic Information     Date Of Birth Sex Race Ethnicity Preferred Language    1990 Female White Non- English      Your appointments     Jul 06, 2017  6:45 AM   Adult Draw/Collection with [facility]   [facility] (--)    [address] [facility]  [place] [address]   [phone]            Jul 14, 2017 10:00 AM   Follow Up Med Management with Ronny Burnette M.D.   [facility] [address] [place] [address] ([facility])    [address] [facility]  [address]  [place] [address]   [phone]            Jul 17, 2017 10:00 AM   Individual Therapy with Blanca Brantley L.C.S.W.   [facility] [place]    [address] [facility] [facility]  [address]  [place] [address]   [phone]            Jul 19, 2017  8:40 AM   FOLLOW UP with Torres Kumar M.D.   [facility] (--)    [address] [facility]  [place] [address] [phone]            Aug 11, 2017 10:00 AM   Follow Up Visit with Sandoval Fox M.D.   [facility] (--)    [address], [address]  [place] [address] [phone]           You will be receiving a confirmation call a few days before your appointment from our automated call confirmation system.            Aug 23, 2017  9:00 AM   Established Patient with Torres Brody M.D.   [facility] [address] ([facility])    [address]  [address]  [place] [address] [phone]           You will be receiving a confirmation call a few days before your appointment from our automated call confirmation system.              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 Low  9/18/2010 - Present    Neurogenic bladder N31.9 Low  4/2/2011 - Present    Sinus tachycardia (Chronic) R00.0 High  10/31/2013 - Present    Knee pain, right M25.561 Low  2/13/2014 -  Present    Anxiety F41.9 Low  12/16/2014 - Present    Fatty liver disease, nonalcoholic K76.0 Low  1/19/2015 - Present    Progressive focal motor weakness R53.1 Low  6/28/2015 - Present    Acquired hypothyroidism E03.9 Low  11/23/2015 - Present    PCOS (polycystic ovarian syndrome) E28.2 Low  11/23/2015 - Present    H/O prior ablation treatment Z98.890   2/10/2016 - Present    Peripheral neuropathy (CMS-HCC) (Chronic) G62.9   3/6/2016 - Present    TONYA on CPAP G47.33, Z99.89 Low  3/7/2016 - Present    Morbidly obese (CMS-HCC) E66.01   3/7/2016 - Present    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   3/7/2016 - Present    Scoliosis M41.9   3/7/2016 - Present    GERD (gastroesophageal reflux disease) (Chronic) K21.9   3/7/2016 - Present    Vitamin D deficiency E55.9   5/21/2016 - Present    Chronic inflammatory arthritis (Chronic) M19.90 Medium  5/23/2016 - Present    Weakness of both lower extremities R29.898   6/22/2016 - Present    Elevated sedimentation rate R70.0   6/27/2016 - Present    Galactorrhea O92.6   7/22/2016 - Present    Psychosis, schizophrenia, simple (HCC) (Chronic) F20.89 Low Chronic 9/29/2016 - Present    Schizophrenia (CMS-HCC) F20.9 Low  10/27/2016 - Present    Chronic pain syndrome (Chronic) G89.4   10/27/2016 - Present    Bowel and bladder incontinence R32, R15.9   10/27/2016 - Present    Depression F32.9 Low  10/28/2016 - Present    HTN (hypertension) (Chronic) I10   11/1/2016 - Present    Hypovitaminosis D E55.9   11/29/2016 - Present    Leg weakness R29.898   1/4/2017 - Present    Weakness R53.1   1/22/2017 - Present    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 High  1/24/2017 - Present    Chronic suprapubic catheter (CMS-HCC) (Chronic) Z93.59   2/16/2017 - Present    Leg weakness, bilateral R29.898   2/22/2017 - Present    Weakness of right upper extremity R29.898   2/23/2017 - Present    Chest pain R07.9   3/30/2017 - Present    On home oxygen therapy (Chronic) Z99.81   4/15/2017 - Present     Uncontrolled type 2 diabetes mellitus without complication, without long-term current use of insulin (CMS-[facility]) E11.65   4/26/2017 - Present    Dysuria R30.0   5/9/2017 - Present    UTI (urinary tract infection) N39.0   5/13/2017 - Present    Weakness R53.1   5/13/2017 - Present    Enterococcus faecalis infection B95.2   5/13/2017 - Present    Fall at home W19.XXXA, Y92.099   5/13/2017 - Present    Hashimoto's encephalopathy E06.3, G93.49   5/17/2017 - Present    Rash R21   6/9/2017 - Present      Health Maintenance        Date Due Completion Dates    IMM HEP A VACCINE (1 of 2 - Standard Series) 10/13/1990 ---    IMM VARICELLA (CHICKENPOX) VACCINE (1 of 2 - 2 Dose Adolescent Series) 10/13/2002 ---    IMM INFLUENZA (1) 9/1/2017 10/5/2016, 9/5/2013, 12/7/2011, 11/7/2011    A1C SCREENING 12/28/2017 6/28/2017, 4/6/2017, 12/7/2016, 10/14/2016, 3/9/2016, 9/5/2014    FASTING LIPID PROFILE 3/7/2018 3/7/2017, 2/24/2017, 12/7/2016, 10/28/2016, 10/14/2016, 3/21/2014    URINE ACR / MICROALBUMIN 5/5/2018 5/5/2017, 3/7/2017, 12/7/2016, 10/14/2016, 7/28/2016    RETINAL SCREENING 5/23/2018 5/23/2017    DIABETES MONOFILAMENT / LE EXAM 5/23/2018 5/23/2017    SERUM CREATININE 6/21/2018 6/21/2017, 5/18/2017, 5/17/2017, 5/14/2017, 5/13/2017, 5/5/2017, 4/14/2017, 4/13/2017, 4/12/2017, 4/5/2017, 3/27/2017, 3/18/2017, 3/17/2017, 3/16/2017, 3/11/2017, 3/10/2017, 3/9/2017, 3/7/2017, 2/25/2017, 2/24/2017, 2/23/2017, 2/22/2017, 1/30/2017, 1/27/2017, 1/25/2017, 1/22/2017, 1/22/2017, 1/18/2017, 1/18/2017, 12/7/2016, 12/6/2016, 11/4/2016, 11/3/2016, 11/2/2016, 11/1/2016, 10/28/2016, 10/27/2016, 10/14/2016, 10/4/2016, 10/3/2016, 9/29/2016, 8/16/2016, 7/28/2016, 7/28/2016, 7/10/2016, 6/25/2016, 6/23/2016, 6/22/2016, 6/7/2016, 5/18/2016, 4/19/2016, 4/3/2016, 3/30/2016, 3/29/2016, 3/28/2016, 3/14/2016, 3/10/2016, 3/9/2016, 3/7/2016, 3/6/2016, 2/15/2016, 2/12/2016, 1/29/2016, 1/28/2016, 1/26/2016, 11/28/2015, 11/27/2015, 11/23/2015, 11/22/2015,  "7/9/2015, 7/8/2015, 6/27/2015, 4/14/2015, 3/23/2015, 2/18/2015, 10/27/2014, 9/5/2014, 3/21/2014, 12/24/2013, 1/12/2013, 8/24/2012, 8/23/2012, 8/22/2012, 8/15/2012, 4/21/2012, 4/20/2012, 4/19/2012, 9/17/2011, 4/3/2011, 4/2/2011, 3/31/2011, 9/20/2010, 9/19/2010, 9/18/2010, 8/28/2010, 7/20/2010, 1/30/2007    PAP SMEAR 7/22/2019 7/22/2016 (Postponed), 2/19/2013 (N/S)    Override on 7/22/2016: Postponed (per pt was told could \"skip\" 2016)    Override on 2/19/2013: (N/S) ([facility])    IMM DTaP/Tdap/Td Vaccine (7 - Td) 8/6/2022 8/6/2012, 9/18/2010, 3/3/1994, 5/17/1991, 5/10/1990, 3/23/1990, 1990    COLONOSCOPY 9/25/2023 9/25/2013            Current Immunizations     Dtap Vaccine 3/3/1994, 5/17/1991, 5/10/1990, 3/23/1990, 1990    HIB Vaccine(PEDVAX) 1/11/1991    HPV Quadrivalent Vaccine (GARDASIL) 10/27/2011, 5/27/2011, 3/1/2011    Hepatitis B Vaccine Non-Recombivax (Ped/Adol) 1/28/2004, 10/28/2003, 10/29/1999    Influenza TIV (IM) 9/5/2013, 12/7/2011, 11/7/2011    Influenza Vaccine Adult HD 10/5/2016    MMR Vaccine 3/3/1994, 1/11/1991    OPV - Historical Data 3/3/1994, 5/17/1991, 5/10/1990, 3/23/1990, 1990    Pneumococcal Vaccine (PCV7) Historical Data 11/8/2015    Pneumococcal polysaccharide vaccine (PPSV-23) 1/23/2017  5:35 PM, 1/14/2017    TD Vaccine 9/18/2010  4:45 PM    Tdap Vaccine 8/6/2012      Below and/or attached are the medications your provider expects you to take. Review all of your home medications and newly ordered medications with your provider and/or pharmacist. Follow medication instructions as directed by your provider and/or pharmacist. Please keep your medication list with you and share with your provider. Update the information when medications are discontinued, doses are changed, or new medications (including over-the-counter products) are added; and carry medication information at all times in the event of emergency situations     Allergies:  CEFDINIR - Shortness of Breath,Itching     " DEPAKOTE - Unspecified     ABILIFY - Unspecified     AMITRIPTYLINE - Unspecified     AMOXICILLIN - Rash     CIPROFLOXACIN - Rash     CLINDAMYCIN - Nausea     DOXYCYCLINE - Vomiting,Nausea     EES - Vomiting,Nausea     FLAGYL - Unspecified     FLOMAX - Swelling     METFORMIN - Unspecified     SULFA DRUGS - Hives,Rash     TAPE - Rash     VANCOMYCIN - Itching     CEPHALEXIN - Rash     ERYTHROMYCIN - (reactions not documented)     LEVOFLOXACIN - Unspecified     METRONIDAZOLE - (reactions not documented)     VALPROIC ACID - (reactions not documented)               Medications  Valid as of: July 04, 2017 - 10:48 AM    Generic Name Brand Name Tablet Size Instructions for use    Albuterol Sulfate (Aero Soln) albuterol 108 (90 BASE) MCG/ACT Inhale 2 Puffs by mouth every 6 hours as needed for Shortness of Breath.        Albuterol Sulfate (Aero Soln) albuterol 108 (90 BASE) MCG/ACT Inhale 2 Puffs by mouth every 6 hours as needed for Shortness of Breath.        Aspirin (Tablet Delayed Response) ECOTRIN 81 MG Take 1 Tab by mouth every day.        Aspirin (Chew Tab) ASA 81 MG Take 81 mg by mouth every day.        Bacitracin-Polymyxin B (Ointment) POLYSPORIN 500-98134 UNIT/GM Apply 1 Each to affected area(s) 2 times a day.        Baclofen (Tab) LIORESAL 10 MG Take 1 Tab by mouth 3 times a day.        Cefdinir (Cap) OMNICEF 300 MG Take 1 Cap by mouth 2 times a day.        Cranberry   Take 100,400 mg by mouth 2 times a day.        Cranberry (Tab) Cranberry 400 MG Take 2 Tabs by mouth every day.        Cyanocobalamin (Tab) vitamin b12 500 MCG Take 1,000 mcg by mouth 2 times a day.        Cyanocobalamin (Tab) VITAMIN B-12 500 MCG Take 1,000 mcg by mouth every day.        Ergocalciferol (Cap) DRISDOL 25718 UNITS Take 50,000 Units by mouth every Friday.        FentaNYL (PATCH 72 HR) DURAGESIC 25 MCG/HR Apply 1 Patch to skin as directed every 72 hours.        Fluconazole (Tab) DIFLUCAN 150 MG Take 1 Tab by mouth every day. Take on day  day one and day 3.        FLUoxetine HCl (Cap) PROZAC 10 MG TAKE ONE CAPSULE BY MOUTH ONCE A DAY        FLUoxetine HCl (Cap) PROZAC 10 MG Take 10 mg by mouth every day.        Fosfomycin Tromethamine   Take  by mouth.        Furosemide (Tab) LASIX 40 MG Take 40 mg by mouth every day.        Gabapentin (Cap) NEURONTIN 300 MG Take 600 mg by mouth 3 times a day.        Gabapentin (Once-Daily) (Tab) GRALISE 600 MG Take 600 mg by mouth.        Ivabradine HCl (Tab) CORLANOR 5 MG Take 1 Tab by mouth 2 times a day, with meals.        Ivabradine HCl (Tab) CORLANOR 5 MG Take 5 mg by mouth 2 times a day, with meals.        Lactobacillus (Pack) LACTINEX/FLORANEX  Take 1 Packet by mouth 3 times a day, with meals.        Lactulose (Solution) lactulose 10 GM/15ML Take 10 g by mouth 2 times a day as needed.        Lactulose (Solution) lactulose 10 GM/15ML Take 10 g by mouth 2 times a day as needed.        Levothyroxine Sodium (Tab) SYNTHROID 75 MCG Take 75 mcg by mouth every morning.        Levothyroxine Sodium (Tab) SYNTHROID 75 MCG Take 75 mcg by mouth Every morning on an empty stomach.        Liraglutide (Solution Pen-injector) VICTOZA 18 MG/3ML Inject 0.3 mL as instructed every day.        Liraglutide (Solution Pen-injector) VICTOZA 18 MG/3ML Inject 1.2 mg as instructed every day.        Melatonin (Tab) Melatonin 5 MG Take 10 mg by mouth every bedtime.        Melatonin (Tab) Melatonin 5 MG Take 10 mg by mouth every bedtime.        Mirabegron (TABLET SR 24 HR) Mirabegron ER 25 MG Take 1 Tab by mouth every day.        Nitrofurantoin Monohyd Macro (Cap) MACROBID 100 MG Take 1 Cap by mouth 2 times a day, with meals.        Nitroglycerin (SL Tab) NITROSTAT 0.3 MG PLACE 1 TABLET UNDER TONGUE IF NEEDED FOR CHEST PAIN EVERY 5 MINUTES FOR 3 DOSES AS DIRECTED        Omeprazole (CAPSULE DELAYED RELEASE) PRILOSEC 20 MG Take 20 mg by mouth 2 times a day.        Omeprazole (CAPSULE DELAYED RELEASE) PRILOSEC 20 MG Take 20 mg by mouth 2 times  a day.        Oxycodone-Acetaminophen (Tab) PERCOCET-10  MG Take 1-2 Tabs by mouth every 6 hours as needed for Severe Pain. Scheduled.        Oxycodone-Acetaminophen (Tab) PERCOCET-10  MG Take 2 Tabs by mouth every 6 hours as needed for Severe Pain.        Promethazine HCl (Tab) PHENERGAN 25 MG Take 1 Tab by mouth every 6 hours as needed for Nausea/Vomiting.        Promethazine HCl (Tab) PHENERGAN 25 MG Take 25 mg by mouth every day.        RaNITidine HCl (Tab) ZANTAC 150 MG Take 150 mg by mouth 2 times a day.        RaNITidine HCl (Tab) ZANTAC 150 MG Take 150 mg by mouth 2 times a day.        SITagliptin Phosphate (Tab) JANUVIA 100 MG Take 1 Tab by mouth every day.        Sodium Bicarbonate (Tab) sodium bicarbonate 325 MG Take 2 Tabs by mouth 3 times a day.        Sodium Bicarbonate (Tab) sodium bicarbonate 325 MG Take 650 mg by mouth 3 times a day.        Tobramycin (Solution) TOBREX 0.3 % Place 2 Drops in both eyes every 4 hours. Use for 7 days        TraMADol HCl (Tab) ULTRAM 50 MG Take 1 Tab by mouth every 6 hours as needed for Moderate Pain.        Ziprasidone HCl (Cap) GEODON 80 MG Take 160 mg by mouth every evening. Takes this at 1700 daily        Ziprasidone HCl (Cap) GEODON 80 MG Take 160 mg by mouth every evening.        .                 Medicines prescribed today were sent to:     [id number] - [place], [place] - [address] [address]    Phone: [phone] Fax: [phone]    Open 24 Hours?: No      Medication refill instructions:       If your prescription bottle indicates you have medication refills left, it is not necessary to call your provider’s office. Please contact your pharmacy and they will refill your medication.    If your prescription bottle indicates you do not have any refills left, you may request refills at any time through one of the following ways: The online [employer] system (except Urgent Care), by calling your provider’s office, or  by asking your pharmacy to contact your provider’s office with a refill request. Medication refills are processed only during regular business hours and may not be available until the next business day. Your provider may request additional information or to have a follow-up visit with you prior to refilling your medication.   *Please Note: Medication refills are assigned a new Rx number when refilled electronically. Your pharmacy may indicate that no refills were authorized even though a new prescription for the same medication is available at the pharmacy. Please request the medicine by name with the pharmacy before contacting your provider for a refill.        Other Notes About Your Plan     DME:  [facility] / ph [phone] / fax [phone]              MyChart Access Code: Activation code not generated  Current MyChart Status: Active

## 2017-07-04 NOTE — PROGRESS NOTES
Urgent Care Note   This patients PCP is: Torres Brody M.D.    Reason for visit:  Having eye discharge and left ear pain    HPI:  Kristin Balderrama is a 26 y.o. old patient who is seeing us today in the [facility].  This is a pleasant patient and I appreciate the opportunity to participate in the care of this patient.  This is a new problem today    1. Acute bacterial conjunctivitis of both eyes  goopy eye glued shut this Am and eyes are swelling for the last days and some pain in the eyes.      2. Pain in left ear  Left ear pain for two days.  No drainage in the ear    Minor sore throat and a cough for several days       ROS:   Constitutional: no fatigue,   HEENT: No Headache, No sore throat.  Ears:  No ear pain  Eyes:  No blurred vision, No goopy/crusted eyes  Cardiac: No racing heart rate  Resp: No shortness of breath,  No cough, No wheezing.  Gastro: No nausea or vomiting, No diarrhea.    All other systems were reviewed and were negative.      Past Medical History:  Patient Active Problem List    Diagnosis Date Noted   • Paraparesis of both lower limbs (CMS-HCC) 01/24/2017     Priority: High   • Sinus tachycardia 10/31/2013     Priority: High   • Chronic inflammatory arthritis 05/23/2016     Priority: Medium   • Depression 10/28/2016     Priority: Low   • Schizophrenia (CMS-HCC) 10/27/2016     Priority: Low   • Psychosis, schizophrenia, simple ([facility]) 09/29/2016     Priority: Low     Class: Chronic   • TONYA on CPAP 03/07/2016     Priority: Low   • Acquired hypothyroidism 11/23/2015     Priority: Low   • PCOS (polycystic ovarian syndrome) 11/23/2015     Priority: Low   • Progressive focal motor weakness 06/28/2015     Priority: Low   • Fatty liver disease, nonalcoholic 01/19/2015     Priority: Low   • Anxiety 12/16/2014     Priority: Low   • Knee pain, right 02/13/2014     Priority: Low   • Neurogenic bladder 04/02/2011     Priority: Low   • Chronic UTI 09/18/2010     Priority: Low   • Multiple  personality disorder 09/18/2010     Priority: Low   • Rash 06/09/2017   • Hashimoto's encephalopathy 05/17/2017   • UTI (urinary tract infection) 05/13/2017   • Weakness 05/13/2017   • Enterococcus faecalis infection 05/13/2017   • Fall at home 05/13/2017   • Dysuria 05/09/2017   • Uncontrolled type 2 diabetes mellitus without complication, without long-term current use of insulin (CMS-HCC) 04/26/2017   • On home oxygen therapy 04/15/2017   • Chest pain 03/30/2017   • Weakness of right upper extremity 02/23/2017   • Leg weakness, bilateral 02/22/2017   • Chronic suprapubic catheter (CMS-HCC) 02/16/2017   • Weakness 01/22/2017   • Leg weakness 01/04/2017   • Hypovitaminosis D 11/29/2016   • HTN (hypertension) 11/01/2016   • Chronic pain syndrome 10/27/2016   • Bowel and bladder incontinence 10/27/2016   • Galactorrhea 07/22/2016   • Elevated sedimentation rate 06/27/2016   • Weakness of both lower extremities 06/22/2016   • Vitamin D deficiency 05/21/2016   • Morbidly obese (CMS-HCC) 03/07/2016   • Conversion disorder 03/07/2016   • Scoliosis 03/07/2016   • GERD (gastroesophageal reflux disease) 03/07/2016   • Peripheral neuropathy (CMS-HCC) 03/06/2016   • H/O prior ablation treatment 02/10/2016       Past Surgical History:  Past Surgical History   Procedure Laterality Date   • Neuro dest facet l/s w/ig sngl  4/21/2015     Performed by Reza Tabor at SUR[facility]ERY [facility]   • Recovery  1/27/2016     Procedure: CATH LAB EP STUDY WITH SINUS NODE MODIFICATION ABHINAV;  Surgeon: [facility];  Location: SURGERY PRE-POST PROC UNIT [facility];  Service:    • Katie by laparoscopy  8/29/2010     Performed by SHAYY JOHNS at SURGERY [facility] ORS   • Lumbar fusion anterior  8/21/2012     Performed by SUSIE SAWANT at SURGERY [facility] ORS   • Other cardiac surgery  1/2016     cardiac ablation   • Tonsillectomy       tonsillectomy   • Bowel stimulator insertion  7/13/2016     Procedure: BOWEL STIMULATOR  INSERTION FOR PERMANENT INTERSTIM SACRAL IMPLANT;  Surgeon: Joe Noyola M.D.;  Location: SURGERY [facility] ORS;  Service:    • Gastroscopy with balloon dilatation N/A 1/4/2017     Procedure: GASTROSCOPY WITH DILATATION;  Surgeon: Torres Vargas M.D.;  Location: SURGERY [facility];  Service:    • Muscle biopsy Right 1/26/2017     Procedure: MUSCLE BIOPSY - THIGH;  Surgeon: Isidro Vigil M.D.;  Location: SURGERY [facility];  Service:    • Other abdominal surgery         Allergies:  Cefdinir; Depakote; Abilify; Amitriptyline; Amoxicillin; Ciprofloxacin; Clindamycin; Doxycycline; Ees; Flagyl; Flomax; Metformin; Sulfa drugs; Tape; Vancomycin; Cephalexin; Erythromycin; Levofloxacin; Metronidazole; and Valproic acid    Social History:  Social History     Social History   • Marital Status: Single     Spouse Name: N/A   • Number of Children: N/A   • Years of Education: N/A     Occupational History   • Not on file.     Social History Main Topics   • Smoking status: Never Smoker    • Smokeless tobacco: Never Used   • Alcohol Use: No   • Drug Use: No   • Sexual Activity: Not Currently     Birth Control/ Protection: Pill     Other Topics Concern   • Not on file     Social History Narrative    ** Merged History Encounter **            Family History:  Family History   Problem Relation Age of Onset   • Hypertension Mother    • Sleep Apnea Mother    • Heart Disease Mother    • Other Mother      hypothryod   • Hypertension Maternal Uncle    • Heart Disease Maternal Grandmother    • Hypertension Maternal Grandmother    • Other Sister      Narcolepsy;fibromyalsia;bone;nerve   • Genitourinary () Sister      endometriosis       Medications:    Current outpatient prescriptions:   •  FOSFOMYCIN TROMETHAMINE PO, Take  by mouth., Disp: , Rfl:   •  nitroGLYCERIN (NITROSTAT) 0.3 MG SL tablet, PLACE 1 TABLET UNDER TONGUE IF NEEDED FOR CHEST PAIN EVERY 5 MINUTES FOR 3 DOSES AS DIRECTED, Disp: , Rfl: 0  •  fluconazole (DIFLUCAN)  150 MG tablet, Take 1 Tab by mouth every day. Take on day day one and day 3., Disp: 2 Tab, Rfl: 0  •  sitagliptin (JANUVIA) 100 MG Tab, Take 1 Tab by mouth every day., Disp: 30 Tab, Rfl: 6  •  Mirabegron ER (MYRBETRIQ) 25 MG TABLET SR 24 HR, Take 1 Tab by mouth every day., Disp: , Rfl:   •  oxycodone-acetaminophen (PERCOCET-10)  MG Tab, Take 2 Tabs by mouth every 6 hours as needed for Severe Pain., Disp: , Rfl:   •  oxycodone-acetaminophen (PERCOCET-10)  MG Tab, Take 1-2 Tabs by mouth every 6 hours as needed for Severe Pain. Scheduled., Disp: 1 Tab, Rfl: 0  •  omeprazole (PRILOSEC) 20 MG delayed-release capsule, Take 20 mg by mouth 2 times a day., Disp: , Rfl:   •  levothyroxine (SYNTHROID) 75 MCG Tab, Take 75 mcg by mouth Every morning on an empty stomach., Disp: , Rfl:   •  sodium bicarbonate 325 MG Tab, Take 650 mg by mouth 3 times a day., Disp: , Rfl:   •  cyanocobalamin (VITAMIN B-12) 500 MCG Tab, Take 1,000 mcg by mouth every day., Disp: , Rfl:   •  promethazine (PHENERGAN) 25 MG Tab, Take 25 mg by mouth every day., Disp: , Rfl:   •  ziprasidone (GEODON) 80 MG Cap, Take 160 mg by mouth every evening., Disp: , Rfl:   •  aspirin (ASA) 81 MG Chew Tab chewable tablet, Take 81 mg by mouth every day., Disp: , Rfl:   •  Melatonin 5 MG Tab, Take 10 mg by mouth every bedtime., Disp: , Rfl:   •  fluoxetine (PROZAC) 10 MG Cap, Take 10 mg by mouth every day., Disp: , Rfl:   •  ivabradine (CORLANOR) 5 MG Tab tablet, Take 5 mg by mouth 2 times a day, with meals., Disp: , Rfl:   •  furosemide (LASIX) 40 MG Tab, Take 40 mg by mouth every day., Disp: , Rfl:   •  ranitidine (ZANTAC) 150 MG Tab, Take 150 mg by mouth 2 times a day., Disp: , Rfl:   •  gabapentin (NEURONTIN) 300 MG Cap, Take 600 mg by mouth 3 times a day., Disp: , Rfl:   •  liraglutide (VICTOZA) 18 MG/3ML Solution Pen-injector injection, Inject 1.2 mg as instructed every day., Disp: , Rfl:   •  Cranberry 400 MG Tab, Take 2 Tabs by mouth every day.,  Disp: , Rfl:   •  promethazine (PHENERGAN) 25 MG Tab, Take 1 Tab by mouth every 6 hours as needed for Nausea/Vomiting., Disp: 30 Tab, Rfl: 6  •  aspirin EC (ECOTRIN) 81 MG Tablet Delayed Response, Take 1 Tab by mouth every day., Disp: 30 Tab, Rfl: 6  •  liraglutide (VICTOZA) 18 MG/3ML Solution Pen-injector injection, Inject 0.3 mL as instructed every day., Disp: 3 PEN, Rfl: 11  •  baclofen (LIORESAL) 10 MG Tab, Take 1 Tab by mouth 3 times a day., Disp: 90 Tab, Rfl: 6  •  CRANBERRY PO, Take 100,400 mg by mouth 2 times a day., Disp: , Rfl:   •  ivabradine (CORLANOR) 5 MG Tab tablet, Take 1 Tab by mouth 2 times a day, with meals., Disp: 60 Tab, Rfl: 6  •  fluoxetine (PROZAC) 10 MG Cap, TAKE ONE CAPSULE BY MOUTH ONCE A DAY, Disp: 30 Cap, Rfl: 2  •  ranitidine (ZANTAC) 150 MG Tab, Take 150 mg by mouth 2 times a day., Disp: , Rfl:   •  Melatonin 5 MG Tab, Take 10 mg by mouth every bedtime., Disp: , Rfl:   •  fentanyl (DURAGESIC) 25 MCG/HR PATCH 72 HR, Apply 1 Patch to skin as directed every 72 hours., Disp: , Rfl:   •  lactulose 10 GM/15ML Solution, Take 10 g by mouth 2 times a day as needed., Disp: , Rfl:   •  vitamin D, Ergocalciferol, (DRISDOL) 75524 UNITS Cap capsule, Take 50,000 Units by mouth every Friday., Disp: , Rfl:   •  albuterol 108 (90 BASE) MCG/ACT Aero Soln inhalation aerosol, Inhale 2 Puffs by mouth every 6 hours as needed for Shortness of Breath., Disp: , Rfl:   •  cyanocobalamin (HM VITAMIN B12) 500 MCG tablet, Take 1,000 mcg by mouth 2 times a day., Disp: , Rfl:   •  levothyroxine (SYNTHROID) 75 MCG Tab, Take 75 mcg by mouth every morning., Disp: , Rfl:   •  omeprazole (PRILOSEC) 20 MG delayed-release capsule, Take 20 mg by mouth 2 times a day., Disp: , Rfl:   •  tramadol (ULTRAM) 50 MG Tab, Take 1 Tab by mouth every 6 hours as needed for Moderate Pain., Disp: 21 Tab, Rfl: 0  •  bacitracin-polymyxin b (POLYSPORIN) 500-95758 UNIT/GM Ointment, Apply 1 Each to affected area(s) 2 times a day., Disp: 1 Tube,  "Rfl: 0  •  GRALISE 600 MG Tab, Take 600 mg by mouth., Disp: , Rfl:   •  nitrofurantoin monohydr macro (MACROBID) 100 MG Cap, Take 1 Cap by mouth 2 times a day, with meals., Disp: 8 Cap, Rfl: 0  •  lactobacillus granules (LACTINEX/FLORANEX) Pack, Take 1 Packet by mouth 3 times a day, with meals., Disp: , Rfl:   •  albuterol 108 (90 BASE) MCG/ACT Aero Soln inhalation aerosol, Inhale 2 Puffs by mouth every 6 hours as needed for Shortness of Breath., Disp: , Rfl:   •  lactulose 10 GM/15ML Solution, Take 10 g by mouth 2 times a day as needed., Disp: , Rfl:   •  ziprasidone (GEODON) 80 MG Cap, Take 160 mg by mouth every evening. Takes this at 1700 daily, Disp: , Rfl:   •  sodium bicarbonate 325 MG Tab, Take 2 Tabs by mouth 3 times a day., Disp: , Rfl:     Physical Examination:   Vital signs: /80 mmHg  Pulse 80  Temp(Src) 36.6 °C (97.9 °F)  Resp 18  Ht 1.6 m (5' 2.99\")  Wt 117.482 kg (259 lb)  BMI 45.89 kg/m2  SpO2 95%  Breastfeeding? No  General: No distress, cooperative, well dressed and well nourished.   Eyes: No scleral icterus ++ discharge right worse than the left, No hyposphagma  ENMT: Normal on external inspection of nose, lips, No nasal drainage   She has a left TM inflammation with bulging and the right TM is also with erythema however a blue tube is in place in the 5 o'clock position  Neck: No abnormal masses on inspection  Resp: Normal effort, Bilateral clear to auscultation, No wheezing,   CVS: Regular rate and rhythm,  No murmur.   Extremities: No edema bilateral extremities  Neuro: Alert and oriented  Skin: No rash, No Ulcers  Psych: Normal mood and affect      Assessment and Plan:    1. Acute bacterial conjunctivitis of both eyes  I will give her Tobramycin I think this will work better than Polytrim at this point.      2. Pain in left ear  Omnicef given for left OM.    Diabetes:  She is Victoza and Januvia.  This is redundant and the januvia should be removed    Follow-up with your PCP in 3-7 " days if you are not getting better.  Return to UC or the ER if symptoms become worse.  Patient understands these discharge instructions.      Vance Lazo PA-C  07/04/2017    CC:   Torres Brody M.D.

## 2017-07-05 ENCOUNTER — APPOINTMENT (OUTPATIENT)
Dept: RADIOLOGY | Facility: IMAGING CENTER | Age: 27
End: 2017-07-05
Attending: FAMILY MEDICINE
Payer: MEDICARE

## 2017-07-05 ENCOUNTER — TELEPHONE (OUTPATIENT)
Dept: MEDICAL GROUP | Facility: MEDICAL CENTER | Age: 27
End: 2017-07-05

## 2017-07-05 ENCOUNTER — OFFICE VISIT (OUTPATIENT)
Dept: MEDICAL GROUP | Facility: CLINIC | Age: 27
End: 2017-07-05
Payer: MEDICARE

## 2017-07-05 ENCOUNTER — TELEPHONE (OUTPATIENT)
Dept: URGENT CARE | Facility: CLINIC | Age: 27
End: 2017-07-05

## 2017-07-05 VITALS
TEMPERATURE: 98.2 F | RESPIRATION RATE: 16 BRPM | HEART RATE: 71 BPM | HEIGHT: 62 IN | SYSTOLIC BLOOD PRESSURE: 120 MMHG | OXYGEN SATURATION: 95 % | DIASTOLIC BLOOD PRESSURE: 80 MMHG

## 2017-07-05 DIAGNOSIS — R29.6 RECURRENT FALLS: ICD-10-CM

## 2017-07-05 DIAGNOSIS — K59.00 CONSTIPATION, UNSPECIFIED CONSTIPATION TYPE: ICD-10-CM

## 2017-07-05 DIAGNOSIS — S99.922A INJURY OF LEFT FOOT, INITIAL ENCOUNTER: ICD-10-CM

## 2017-07-05 DIAGNOSIS — S93.602A SPRAIN OF LEFT FOOT, INITIAL ENCOUNTER: ICD-10-CM

## 2017-07-05 PROCEDURE — 73630 X-RAY EXAM OF FOOT: CPT | Mod: TC,LT | Performed by: FAMILY MEDICINE

## 2017-07-05 PROCEDURE — 99214 OFFICE O/P EST MOD 30 MIN: CPT | Performed by: FAMILY MEDICINE

## 2017-07-05 RX ORDER — ZIPRASIDONE HYDROCHLORIDE 80 MG/1
CAPSULE ORAL
Qty: 60 CAP | Refills: 5 | Status: SHIPPED | OUTPATIENT
Start: 2017-07-05 | End: 2017-08-29

## 2017-07-05 ASSESSMENT — PAIN SCALES - GENERAL: PAINLEVEL: 8=MODERATE-SEVERE PAIN

## 2017-07-05 NOTE — MR AVS SNAPSHOT
"        Kristin Armstrong Balderrama   2017 11:40 AM   Office Visit   MRN: [id number]    Department:  [facility]   Dept Phone:  [phone]    Description:  Female : 1990   Provider:  Neena Agosto M.D.           Reason for Visit     Foot Pain Left foot pain and swelling x 1 day    Constipation x 1 week      Allergies as of 2017     Allergen Noted Reactions    Cefdinir 2016   Shortness of Breath, Itching    Tolerated 17    Depakote [Divalproex Sodium] 2010   Unspecified    Muscle spasms/muscle pain and weakness      Abilify 2013   Unspecified    Headaches/muscle twitching      Amitriptyline 10/31/2013   Unspecified    Headaches      Amoxicillin 2010   Rash    Pt states \"I get a rash\".      Ciprofloxacin 2009   Rash    Pt states \"I get a rash\".      Clindamycin 2011   Nausea    Even with food      Doxycycline 08/15/2012   Vomiting, Nausea    RXN=unknown    Ees [Erythromycin] 2010   Vomiting, Nausea    Flagyl [Metronidazole Hcl] 2011   Unspecified    \"eye problems\"      Flomax [Tamsulosin Hydrochloride] 2009   Swelling    Metformin 2013   Unspecified    Increased lactic acid       Sulfa Drugs 2010   Hives, Rash    RXN=since childhood    Tape 08/15/2012   Rash    Tears skin off   coban with Tegaderm tape ok  RXN=ongoing    Vancomycin 07/10/2016   Itching    Pt becomes flushed in face and chest.   RXN=7/10/16    Cephalexin [Keflex] 2017   Rash    Pt states she gets a rash with this medication    Erythromycin 2017       Levofloxacin 10/27/2016   Unspecified    Leg muscle cramps    Metronidazole 2017       Valproic Acid 2017         You were diagnosed with     Injury of left foot, initial encounter   [297556]       Constipation, unspecified constipation type   [1378685]         Vital Signs     Blood Pressure Pulse Temperature Respirations Height       120/80 mmHg 71 36.8 °C (98.2 °F) 16 1.575 m (5' 2.01\")    " Oxygen Saturation Smoking Status                95% Never Smoker           Basic Information     Date Of Birth Sex Race Ethnicity Preferred Language    1990 Female White Non- English      Your appointments     Jul 06, 2017  6:45 AM   Adult Draw/Collection with [facility]   [facility] (--)    [address]  [place] [address]   [phone]            Jul 06, 2017  7:20 AM   Established Patient with Torres Brody M.D.   [facility] [address]    [address]  [address]  [place] [id number] [phone]           You will be receiving a confirmation call a few days before your appointment from our automated call confirmation system.            Jul 07, 2017  9:00 AM   FOLLOW UP with Torres Kumar M.D.   [facility] (--)    [address], [address]  [place] [id number] [phone]            Jul 14, 2017 10:00 AM   Follow Up Med Management with Ronny Burnette M.D.   [facility] [address] [place] [address])    [address] [place] [address]  [address]  [place] [address]   [phone]            Jul 17, 2017 10:00 AM   Individual Therapy with Blanca Brantley L.C.S.W.   [facility]    [address]  [address]  [place] [id number] [phone]            Aug 11, 2017 10:00 AM   Follow Up Visit with Sandoval Fox M.D.   [facility] (--)    [address], [address]  [place] [id number] [phone]           You will be receiving a confirmation call a few days before your appointment from our automated call confirmation system.            Aug 23, 2017  9:00 AM   Established Patient with Torres Brody M.D.   [facility] [address]    [address]  [place] [address] [phone]           You will be receiving a confirmation call a few days before your appointment from our automated call confirmation system.              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 Low  9/18/2010 - Present    Neurogenic bladder N31.9 Low  4/2/2011 - Present    Sinus tachycardia (Chronic) R00.0 High  10/31/2013 - Present    Knee pain, right M25.561 Low  2/13/2014 - Present    Anxiety F41.9 Low  12/16/2014 - Present    Fatty liver disease, nonalcoholic K76.0 Low  1/19/2015 - Present    Progressive focal motor weakness R53.1 Low  6/28/2015 - Present    Acquired hypothyroidism E03.9 Low  11/23/2015 - Present    PCOS (polycystic ovarian syndrome) E28.2 Low  11/23/2015 - Present    H/O prior ablation treatment Z98.890   2/10/2016 - Present    Peripheral neuropathy (CMS-HCC) (Chronic) G62.9   3/6/2016 - Present    TONYA on CPAP G47.33, Z99.89 Low  3/7/2016 - Present    Morbidly obese (CMS-HCC) E66.01   3/7/2016 - Present    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   3/7/2016 - Present    Scoliosis M41.9   3/7/2016 - Present    GERD (gastroesophageal reflux disease) (Chronic) K21.9   3/7/2016 - Present    Vitamin D deficiency E55.9   5/21/2016 - Present    Chronic inflammatory arthritis (Chronic) M19.90 Medium  5/23/2016 - Present    Weakness of both lower extremities R29.898   6/22/2016 - Present    Elevated sedimentation rate R70.0   6/27/2016 - Present    Galactorrhea O92.6   7/22/2016 - Present    Psychosis, schizophrenia, simple (HCC) (Chronic) F20.89 Low Chronic 9/29/2016 - Present    Schizophrenia (CMS-HCC) F20.9 Low  10/27/2016 - Present    Chronic pain syndrome (Chronic) G89.4   10/27/2016 - Present    Bowel and bladder incontinence R32, R15.9   10/27/2016 - Present    Depression F32.9 Low  10/28/2016 - Present    HTN (hypertension) (Chronic) I10   11/1/2016 - Present    Hypovitaminosis D E55.9   11/29/2016 - Present    Leg weakness R29.898   1/4/2017 - Present    Weakness R53.1   1/22/2017 - Present    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 High  1/24/2017 - Present    Chronic  suprapubic catheter (CMS-HCC) (Chronic) Z93.59   2/16/2017 - Present    Leg weakness, bilateral R29.898   2/22/2017 - Present    Weakness of right upper extremity R29.898   2/23/2017 - Present    Chest pain R07.9   3/30/2017 - Present    On home oxygen therapy (Chronic) Z99.81   4/15/2017 - Present    Uncontrolled type 2 diabetes mellitus without complication, without long-term current use of insulin (CMS-HCC) E11.65   4/26/2017 - Present    Dysuria R30.0   5/9/2017 - Present    UTI (urinary tract infection) N39.0   5/13/2017 - Present    Weakness R53.1   5/13/2017 - Present    Enterococcus faecalis infection B95.2   5/13/2017 - Present    Fall at home W19.XXXA, Y92.099   5/13/2017 - Present    Hashimoto's encephalopathy E06.3, G93.49   5/17/2017 - Present    Rash R21   6/9/2017 - Present      Health Maintenance        Date Due Completion Dates    IMM HEP A VACCINE (1 of 2 - Standard Series) 10/13/1990 ---    IMM VARICELLA (CHICKENPOX) VACCINE (1 of 2 - 2 Dose Adolescent Series) 10/13/2002 ---    IMM INFLUENZA (1) 9/1/2017 10/5/2016, 9/5/2013, 12/7/2011, 11/7/2011    A1C SCREENING 12/28/2017 6/28/2017, 4/6/2017, 12/7/2016, 10/14/2016, 3/9/2016, 9/5/2014    FASTING LIPID PROFILE 3/7/2018 3/7/2017, 2/24/2017, 12/7/2016, 10/28/2016, 10/14/2016, 3/21/2014    URINE ACR / MICROALBUMIN 5/5/2018 5/5/2017, 3/7/2017, 12/7/2016, 10/14/2016, 7/28/2016    RETINAL SCREENING 5/23/2018 5/23/2017    DIABETES MONOFILAMENT / LE EXAM 5/23/2018 5/23/2017    SERUM CREATININE 6/21/2018 6/21/2017, 5/18/2017, 5/17/2017, 5/14/2017, 5/13/2017, 5/5/2017, 4/14/2017, 4/13/2017, 4/12/2017, 4/5/2017, 3/27/2017, 3/18/2017, 3/17/2017, 3/16/2017, 3/11/2017, 3/10/2017, 3/9/2017, 3/7/2017, 2/25/2017, 2/24/2017, 2/23/2017, 2/22/2017, 1/30/2017, 1/27/2017, 1/25/2017, 1/22/2017, 1/22/2017, 1/18/2017, 1/18/2017, 12/7/2016, 12/6/2016, 11/4/2016, 11/3/2016, 11/2/2016, 11/1/2016, 10/28/2016, 10/27/2016, 10/14/2016, 10/4/2016, 10/3/2016, 9/29/2016, 8/16/2016,  "7/28/2016, 7/28/2016, 7/10/2016, 6/25/2016, 6/23/2016, 6/22/2016, 6/7/2016, 5/18/2016, 4/19/2016, 4/3/2016, 3/30/2016, 3/29/2016, 3/28/2016, 3/14/2016, 3/10/2016, 3/9/2016, 3/7/2016, 3/6/2016, 2/15/2016, 2/12/2016, 1/29/2016, 1/28/2016, 1/26/2016, 11/28/2015, 11/27/2015, 11/23/2015, 11/22/2015, 7/9/2015, 7/8/2015, 6/27/2015, 4/14/2015, 3/23/2015, 2/18/2015, 10/27/2014, 9/5/2014, 3/21/2014, 12/24/2013, 1/12/2013, 8/24/2012, 8/23/2012, 8/22/2012, 8/15/2012, 4/21/2012, 4/20/2012, 4/19/2012, 9/17/2011, 4/3/2011, 4/2/2011, 3/31/2011, 9/20/2010, 9/19/2010, 9/18/2010, 8/28/2010, 7/20/2010, 1/30/2007    PAP SMEAR 7/22/2019 7/22/2016 (Postponed), 2/19/2013 (N/S)    Override on 7/22/2016: Postponed (per pt was told could \"skip\" 2016)    Override on 2/19/2013: (N/S) ([place])    IMM DTaP/Tdap/Td Vaccine (7 - Td) 8/6/2022 8/6/2012, 9/18/2010, 3/3/1994, 5/17/1991, 5/10/1990, 3/23/1990, 1990    COLONOSCOPY 9/25/2023 9/25/2013            Current Immunizations     Dtap Vaccine 3/3/1994, 5/17/1991, 5/10/1990, 3/23/1990, 1990    HIB Vaccine(PEDVAX) 1/11/1991    HPV Quadrivalent Vaccine (GARDASIL) 10/27/2011, 5/27/2011, 3/1/2011    Hepatitis B Vaccine Non-Recombivax (Ped/Adol) 1/28/2004, 10/28/2003, 10/29/1999    Influenza TIV (IM) 9/5/2013, 12/7/2011, 11/7/2011    Influenza Vaccine Adult HD 10/5/2016    MMR Vaccine 3/3/1994, 1/11/1991    OPV - Historical Data 3/3/1994, 5/17/1991, 5/10/1990, 3/23/1990, 1990    Pneumococcal Vaccine (PCV7) Historical Data 11/8/2015    Pneumococcal polysaccharide vaccine (PPSV-23) 1/23/2017  5:35 PM, 1/14/2017    TD Vaccine 9/18/2010  4:45 PM    Tdap Vaccine 8/6/2012      Below and/or attached are the medications your provider expects you to take. Review all of your home medications and newly ordered medications with your provider and/or pharmacist. Follow medication instructions as directed by your provider and/or pharmacist. Please keep your medication list with you and share with your " provider. Update the information when medications are discontinued, doses are changed, or new medications (including over-the-counter products) are added; and carry medication information at all times in the event of emergency situations     Allergies:  CEFDINIR - Shortness of Breath,Itching     DEPAKOTE - Unspecified     ABILIFY - Unspecified     AMITRIPTYLINE - Unspecified     AMOXICILLIN - Rash     CIPROFLOXACIN - Rash     CLINDAMYCIN - Nausea     DOXYCYCLINE - Vomiting,Nausea     EES - Vomiting,Nausea     FLAGYL - Unspecified     FLOMAX - Swelling     METFORMIN - Unspecified     SULFA DRUGS - Hives,Rash     TAPE - Rash     VANCOMYCIN - Itching     CEPHALEXIN - Rash     ERYTHROMYCIN - (reactions not documented)     LEVOFLOXACIN - Unspecified     METRONIDAZOLE - (reactions not documented)     VALPROIC ACID - (reactions not documented)               Medications  Valid as of: July 05, 2017 -  1:35 PM    Generic Name Brand Name Tablet Size Instructions for use    Albuterol Sulfate (Aero Soln) albuterol 108 (90 BASE) MCG/ACT Inhale 2 Puffs by mouth every 6 hours as needed for Shortness of Breath.        Albuterol Sulfate (Aero Soln) albuterol 108 (90 BASE) MCG/ACT Inhale 2 Puffs by mouth every 6 hours as needed for Shortness of Breath.        Aspirin (Tablet Delayed Response) ECOTRIN 81 MG Take 1 Tab by mouth every day.        Aspirin (Chew Tab) ASA 81 MG Take 81 mg by mouth every day.        Bacitracin-Polymyxin B (Ointment) POLYSPORIN 500-54421 UNIT/GM Apply 1 Each to affected area(s) 2 times a day.        Baclofen (Tab) LIORESAL 10 MG Take 1 Tab by mouth 3 times a day.        Cefdinir (Cap) OMNICEF 300 MG Take 1 Cap by mouth 2 times a day.        Cranberry   Take 100,400 mg by mouth 2 times a day.        Cranberry (Tab) Cranberry 400 MG Take 2 Tabs by mouth every day.        Cyanocobalamin (Tab) vitamin b12 500 MCG Take 1,000 mcg by mouth 2 times a day.        Cyanocobalamin (Tab) VITAMIN B-12 500 MCG Take 1,000  mcg by mouth every day.        Ergocalciferol (Cap) DRISDOL 17791 UNITS Take 50,000 Units by mouth every Friday.        FentaNYL (PATCH 72 HR) DURAGESIC 25 MCG/HR Apply 1 Patch to skin as directed every 72 hours.        Fluconazole (Tab) DIFLUCAN 150 MG Take 1 Tab by mouth every day. Take on day day one and day 3.        FLUoxetine HCl (Cap) PROZAC 10 MG TAKE ONE CAPSULE BY MOUTH ONCE A DAY        FLUoxetine HCl (Cap) PROZAC 10 MG Take 10 mg by mouth every day.        Fosfomycin Tromethamine   Take  by mouth.        Furosemide (Tab) LASIX 40 MG Take 40 mg by mouth every day.        Gabapentin (Cap) NEURONTIN 300 MG Take 600 mg by mouth 3 times a day.        Gabapentin (Once-Daily) (Tab) GRALISE 600 MG Take 600 mg by mouth.        Ivabradine HCl (Tab) CORLANOR 5 MG Take 1 Tab by mouth 2 times a day, with meals.        Ivabradine HCl (Tab) CORLANOR 5 MG Take 5 mg by mouth 2 times a day, with meals.        Lactobacillus (Pack) LACTINEX/FLORANEX  Take 1 Packet by mouth 3 times a day, with meals.        Lactulose (Solution) lactulose 10 GM/15ML Take 10 g by mouth 2 times a day as needed.        Lactulose (Solution) lactulose 10 GM/15ML Take 10 g by mouth 2 times a day as needed.        Levothyroxine Sodium (Tab) SYNTHROID 75 MCG Take 75 mcg by mouth every morning.        Levothyroxine Sodium (Tab) SYNTHROID 75 MCG Take 75 mcg by mouth Every morning on an empty stomach.        Liraglutide (Solution Pen-injector) VICTOZA 18 MG/3ML Inject 0.3 mL as instructed every day.        Liraglutide (Solution Pen-injector) VICTOZA 18 MG/3ML Inject 1.2 mg as instructed every day.        Melatonin (Tab) Melatonin 5 MG Take 10 mg by mouth every bedtime.        Melatonin (Tab) Melatonin 5 MG Take 10 mg by mouth every bedtime.        Mirabegron (TABLET SR 24 HR) Mirabegron ER 25 MG Take 1 Tab by mouth every day.        Nitrofurantoin Monohyd Macro (Cap) MACROBID 100 MG Take 1 Cap by mouth 2 times a day, with meals.        Nitroglycerin (SL  Tab) NITROSTAT 0.3 MG PLACE 1 TABLET UNDER TONGUE IF NEEDED FOR CHEST PAIN EVERY 5 MINUTES FOR 3 DOSES AS DIRECTED        Omeprazole (CAPSULE DELAYED RELEASE) PRILOSEC 20 MG Take 20 mg by mouth 2 times a day.        Omeprazole (CAPSULE DELAYED RELEASE) PRILOSEC 20 MG Take 20 mg by mouth 2 times a day.        Oxycodone-Acetaminophen (Tab) PERCOCET-10  MG Take 1-2 Tabs by mouth every 6 hours as needed for Severe Pain. Scheduled.        Oxycodone-Acetaminophen (Tab) PERCOCET-10  MG Take 2 Tabs by mouth every 6 hours as needed for Severe Pain.        Promethazine HCl (Tab) PHENERGAN 25 MG Take 1 Tab by mouth every 6 hours as needed for Nausea/Vomiting.        Promethazine HCl (Tab) PHENERGAN 25 MG Take 25 mg by mouth every day.        RaNITidine HCl (Tab) ZANTAC 150 MG Take 150 mg by mouth 2 times a day.        RaNITidine HCl (Tab) ZANTAC 150 MG Take 150 mg by mouth 2 times a day.        SITagliptin Phosphate (Tab) JANUVIA 100 MG Take 1 Tab by mouth every day.        Sodium Bicarbonate (Tab) sodium bicarbonate 325 MG Take 2 Tabs by mouth 3 times a day.        Sodium Bicarbonate (Tab) sodium bicarbonate 325 MG Take 650 mg by mouth 3 times a day.        Tobramycin (Solution) TOBREX 0.3 % Place 2 Drops in both eyes every 4 hours. Use for 7 days        TraMADol HCl (Tab) ULTRAM 50 MG Take 1 Tab by mouth every 6 hours as needed for Moderate Pain.        Ziprasidone HCl (Cap) GEODON 80 MG Take 160 mg by mouth every evening. Takes this at 1700 daily        Ziprasidone HCl (Cap) GEODON 80 MG Take 160 mg by mouth every evening.        .                 Medicines prescribed today were sent to:     [id number] - [place], [place] - [address]    [address] [place] [address]    Phone: [phone] Fax: [phone]    Open 24 Hours?: No      Medication refill instructions:       If your prescription bottle indicates you have medication refills left, it is not necessary to call your provider’s office.  Please contact your pharmacy and they will refill your medication.    If your prescription bottle indicates you do not have any refills left, you may request refills at any time through one of the following ways: The online [facility] system (except Urgent Care), by calling your provider’s office, or by asking your pharmacy to contact your provider’s office with a refill request. Medication refills are processed only during regular business hours and may not be available until the next business day. Your provider may request additional information or to have a follow-up visit with you prior to refilling your medication.   *Please Note: Medication refills are assigned a new Rx number when refilled electronically. Your pharmacy may indicate that no refills were authorized even though a new prescription for the same medication is available at the pharmacy. Please request the medicine by name with the pharmacy before contacting your provider for a refill.        Your To Do List     Future Labs/Procedures Complete By Expires    DX-FOOT-COMPLETE 3+ LEFT  As directed 1/5/2018      Other Notes About Your Plan     DME:  [facility] / ph [phone] / fax [phone]              [employer] Access Code: Activation code not generated  Current [facility] Status: Active

## 2017-07-05 NOTE — TELEPHONE ENCOUNTER
1. Caller Name: self                                         Call Back Number: home      Patient approves a detailed voicemail message: N\A    Pt called me because her Pharmacy did not get her prescriptions. I verified we have the correct Pharmacy. I told her I would call in her medication.  I called her Pharmacy and LVM for them to call me back.

## 2017-07-05 NOTE — PROGRESS NOTES
CC: Left foot pain    HPI:    The patient is a 26-year-old white female who is wheelchair bound. She falls frequently. She says yesterday she fell and her left foot got caught under the refrigerator. Since that time she has had severe pain on the top of her left foot and she says she cannot weight-bear at all. She is worried that it is broken.    She also mentions that she has constipation. She has not had a bowel movement in a week. She has tried lactulose, mag citrate, MiraLAX. She denies abdominal pain or discomfort other than feeling a bit bloated.      Patient Active Problem List    Diagnosis Date Noted   • Paraparesis of both lower limbs (CMS-HCC) 01/24/2017     Priority: High   • Sinus tachycardia 10/31/2013     Priority: High   • Chronic inflammatory arthritis 05/23/2016     Priority: Medium   • Depression 10/28/2016     Priority: Low   • Schizophrenia (CMS-HCC) 10/27/2016     Priority: Low   • Psychosis, schizophrenia, simple ([facility]) 09/29/2016     Priority: Low     Class: Chronic   • TONYA on CPAP 03/07/2016     Priority: Low   • Acquired hypothyroidism 11/23/2015     Priority: Low   • PCOS (polycystic ovarian syndrome) 11/23/2015     Priority: Low   • Progressive focal motor weakness 06/28/2015     Priority: Low   • Fatty liver disease, nonalcoholic 01/19/2015     Priority: Low   • Anxiety 12/16/2014     Priority: Low   • Knee pain, right 02/13/2014     Priority: Low   • Neurogenic bladder 04/02/2011     Priority: Low   • Chronic UTI 09/18/2010     Priority: Low   • Multiple personality disorder 09/18/2010     Priority: Low   • Rash 06/09/2017   • Hashimoto's encephalopathy 05/17/2017   • UTI (urinary tract infection) 05/13/2017   • Weakness 05/13/2017   • Enterococcus faecalis infection 05/13/2017   • Fall at home 05/13/2017   • Dysuria 05/09/2017   • Uncontrolled type 2 diabetes mellitus without complication, without long-term current use of insulin (CMS-HCC) 04/26/2017   • On home oxygen therapy 04/15/2017    • Chest pain 03/30/2017   • Weakness of right upper extremity 02/23/2017   • Leg weakness, bilateral 02/22/2017   • Chronic suprapubic catheter (CMS-HCC) 02/16/2017   • Weakness 01/22/2017   • Leg weakness 01/04/2017   • Hypovitaminosis D 11/29/2016   • HTN (hypertension) 11/01/2016   • Chronic pain syndrome 10/27/2016   • Bowel and bladder incontinence 10/27/2016   • Galactorrhea 07/22/2016   • Elevated sedimentation rate 06/27/2016   • Weakness of both lower extremities 06/22/2016   • Vitamin D deficiency 05/21/2016   • Morbidly obese (CMS-HCC) 03/07/2016   • Conversion disorder 03/07/2016   • Scoliosis 03/07/2016   • GERD (gastroesophageal reflux disease) 03/07/2016   • Peripheral neuropathy (CMS-HCC) 03/06/2016   • H/O prior ablation treatment 02/10/2016         Current outpatient prescriptions:   •  FOSFOMYCIN TROMETHAMINE PO, Take  by mouth., Disp: , Rfl:   •  cefdinir (OMNICEF) 300 MG Cap, Take 1 Cap by mouth 2 times a day., Disp: 20 Cap, Rfl: 0  •  tobramycin (TOBREX) 0.3 % Solution ophthalmic solution, Place 2 Drops in both eyes every 4 hours. Use for 7 days, Disp: 1 Bottle, Rfl: 0  •  tramadol (ULTRAM) 50 MG Tab, Take 1 Tab by mouth every 6 hours as needed for Moderate Pain., Disp: 21 Tab, Rfl: 0  •  bacitracin-polymyxin b (POLYSPORIN) 500-39587 UNIT/GM Ointment, Apply 1 Each to affected area(s) 2 times a day., Disp: 1 Tube, Rfl: 0  •  GRALISE 600 MG Tab, Take 600 mg by mouth., Disp: , Rfl:   •  nitroGLYCERIN (NITROSTAT) 0.3 MG SL tablet, PLACE 1 TABLET UNDER TONGUE IF NEEDED FOR CHEST PAIN EVERY 5 MINUTES FOR 3 DOSES AS DIRECTED, Disp: , Rfl: 0  •  fluconazole (DIFLUCAN) 150 MG tablet, Take 1 Tab by mouth every day. Take on day day one and day 3., Disp: 2 Tab, Rfl: 0  •  sitagliptin (JANUVIA) 100 MG Tab, Take 1 Tab by mouth every day., Disp: 30 Tab, Rfl: 6  •  Mirabegron ER (MYRBETRIQ) 25 MG TABLET SR 24 HR, Take 1 Tab by mouth every day., Disp: , Rfl:   •  oxycodone-acetaminophen (PERCOCET-10) 10325  MG Tab, Take 2 Tabs by mouth every 6 hours as needed for Severe Pain., Disp: , Rfl:   •  nitrofurantoin monohydr macro (MACROBID) 100 MG Cap, Take 1 Cap by mouth 2 times a day, with meals., Disp: 8 Cap, Rfl: 0  •  lactobacillus granules (LACTINEX/FLORANEX) Pack, Take 1 Packet by mouth 3 times a day, with meals., Disp: , Rfl:   •  oxycodone-acetaminophen (PERCOCET-10)  MG Tab, Take 1-2 Tabs by mouth every 6 hours as needed for Severe Pain. Scheduled., Disp: 1 Tab, Rfl: 0  •  omeprazole (PRILOSEC) 20 MG delayed-release capsule, Take 20 mg by mouth 2 times a day., Disp: , Rfl:   •  levothyroxine (SYNTHROID) 75 MCG Tab, Take 75 mcg by mouth Every morning on an empty stomach., Disp: , Rfl:   •  sodium bicarbonate 325 MG Tab, Take 650 mg by mouth 3 times a day., Disp: , Rfl:   •  albuterol 108 (90 BASE) MCG/ACT Aero Soln inhalation aerosol, Inhale 2 Puffs by mouth every 6 hours as needed for Shortness of Breath., Disp: , Rfl:   •  cyanocobalamin (VITAMIN B-12) 500 MCG Tab, Take 1,000 mcg by mouth every day., Disp: , Rfl:   •  promethazine (PHENERGAN) 25 MG Tab, Take 25 mg by mouth every day., Disp: , Rfl:   •  lactulose 10 GM/15ML Solution, Take 10 g by mouth 2 times a day as needed., Disp: , Rfl:   •  ziprasidone (GEODON) 80 MG Cap, Take 160 mg by mouth every evening., Disp: , Rfl:   •  aspirin (ASA) 81 MG Chew Tab chewable tablet, Take 81 mg by mouth every day., Disp: , Rfl:   •  Melatonin 5 MG Tab, Take 10 mg by mouth every bedtime., Disp: , Rfl:   •  fluoxetine (PROZAC) 10 MG Cap, Take 10 mg by mouth every day., Disp: , Rfl:   •  ivabradine (CORLANOR) 5 MG Tab tablet, Take 5 mg by mouth 2 times a day, with meals., Disp: , Rfl:   •  furosemide (LASIX) 40 MG Tab, Take 40 mg by mouth every day., Disp: , Rfl:   •  ranitidine (ZANTAC) 150 MG Tab, Take 150 mg by mouth 2 times a day., Disp: , Rfl:   •  gabapentin (NEURONTIN) 300 MG Cap, Take 600 mg by mouth 3 times a day., Disp: , Rfl:   •  liraglutide (VICTOZA) 18  MG/3ML Solution Pen-injector injection, Inject 1.2 mg as instructed every day., Disp: , Rfl:   •  Cranberry 400 MG Tab, Take 2 Tabs by mouth every day., Disp: , Rfl:   •  promethazine (PHENERGAN) 25 MG Tab, Take 1 Tab by mouth every 6 hours as needed for Nausea/Vomiting., Disp: 30 Tab, Rfl: 6  •  aspirin EC (ECOTRIN) 81 MG Tablet Delayed Response, Take 1 Tab by mouth every day., Disp: 30 Tab, Rfl: 6  •  liraglutide (VICTOZA) 18 MG/3ML Solution Pen-injector injection, Inject 0.3 mL as instructed every day., Disp: 3 PEN, Rfl: 11  •  baclofen (LIORESAL) 10 MG Tab, Take 1 Tab by mouth 3 times a day., Disp: 90 Tab, Rfl: 6  •  CRANBERRY PO, Take 100,400 mg by mouth 2 times a day., Disp: , Rfl:   •  ivabradine (CORLANOR) 5 MG Tab tablet, Take 1 Tab by mouth 2 times a day, with meals., Disp: 60 Tab, Rfl: 6  •  fluoxetine (PROZAC) 10 MG Cap, TAKE ONE CAPSULE BY MOUTH ONCE A DAY, Disp: 30 Cap, Rfl: 2  •  ranitidine (ZANTAC) 150 MG Tab, Take 150 mg by mouth 2 times a day., Disp: , Rfl:   •  Melatonin 5 MG Tab, Take 10 mg by mouth every bedtime., Disp: , Rfl:   •  ziprasidone (GEODON) 80 MG Cap, Take 160 mg by mouth every evening. Takes this at 1700 daily, Disp: , Rfl:   •  fentanyl (DURAGESIC) 25 MCG/HR PATCH 72 HR, Apply 1 Patch to skin as directed every 72 hours., Disp: , Rfl:   •  lactulose 10 GM/15ML Solution, Take 10 g by mouth 2 times a day as needed., Disp: , Rfl:   •  vitamin D, Ergocalciferol, (DRISDOL) 10548 UNITS Cap capsule, Take 50,000 Units by mouth every Friday., Disp: , Rfl:   •  albuterol 108 (90 BASE) MCG/ACT Aero Soln inhalation aerosol, Inhale 2 Puffs by mouth every 6 hours as needed for Shortness of Breath., Disp: , Rfl:   •  cyanocobalamin (HM VITAMIN B12) 500 MCG tablet, Take 1,000 mcg by mouth 2 times a day., Disp: , Rfl:   •  sodium bicarbonate 325 MG Tab, Take 2 Tabs by mouth 3 times a day., Disp: , Rfl:   •  levothyroxine (SYNTHROID) 75 MCG Tab, Take 75 mcg by mouth every morning., Disp: , Rfl:   •   omeprazole (PRILOSEC) 20 MG delayed-release capsule, Take 20 mg by mouth 2 times a day., Disp: , Rfl:     Allergies as of 07/05/2017 - Joe as Reviewed 07/05/2017   Allergen Reaction Noted   • Cefdinir Shortness of Breath and Itching 03/01/2016   • Depakote [divalproex sodium] Unspecified 06/14/2010   • Abilify Unspecified 01/17/2013   • Amitriptyline Unspecified 10/31/2013   • Amoxicillin Rash 09/18/2010   • Ciprofloxacin Rash 12/17/2009   • Clindamycin Nausea 02/02/2011   • Doxycycline Vomiting and Nausea 08/15/2012   • Ees [erythromycin] Vomiting and Nausea 08/28/2010   • Flagyl [metronidazole hcl] Unspecified 03/31/2011   • Flomax [tamsulosin hydrochloride] Swelling 09/24/2009   • Metformin Unspecified 07/23/2013   • Sulfa drugs Hives and Rash 09/18/2010   • Tape Rash 08/15/2012   • Vancomycin Itching 07/10/2016   • Cephalexin [keflex] Rash 01/01/2017   • Erythromycin  03/30/2017   • Levofloxacin Unspecified 10/27/2016   • Metronidazole  03/30/2017   • Valproic acid  03/30/2017       Social History     Social History   • Marital Status: Single     Spouse Name: N/A   • Number of Children: N/A   • Years of Education: N/A     Occupational History   • Not on file.     Social History Main Topics   • Smoking status: Never Smoker    • Smokeless tobacco: Never Used   • Alcohol Use: No   • Drug Use: No   • Sexual Activity: Not Currently     Birth Control/ Protection: Pill     Other Topics Concern   • Not on file     Social History Narrative    ** Merged History Encounter **            Family History   Problem Relation Age of Onset   • Hypertension Mother    • Sleep Apnea Mother    • Heart Disease Mother    • Other Mother      hypothryod   • Hypertension Maternal Uncle    • Heart Disease Maternal Grandmother    • Hypertension Maternal Grandmother    • Other Sister      Narcolepsy;fibromyalsia;bone;nerve   • Genitourinary () Sister      endometriosis       Past Medical History   Diagnosis Date   • Scoliosis    • Multiple  "personality disorder    • Chronic UTI 9/18/2010   • Heart burn    • Pain 08-15-12     back, 7/10   • History of falling    • Sinus tachycardia 10/31/2013   • Urinary incontinence    • Hypertension    • Disorder of thyroid    • Obesity    • Pneumonia 2012   • ASTHMA      when around smoke   • Breath shortness      with tachycardia   • Anginal syndrome      random chest pain especially with tachycardia   • Psychosis    • Arthritis      osteo   • PCOS (polycystic ovarian syndrome)    • Gynecological disorder      PCOS   • Renal disorder      \"kidney disease, stage 1\" nephrologist, Dr. Vallejo   • Arrhythmia      \"sinus tachycardia\", cariologist, Dr. 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   • Sleep apnea      CPAP \"pulmonary doctor took me off mid year 2016\"   • Mitochondrial disease    • Fall        Past Surgical History   Procedure Laterality Date   • Neuro dest facet l/s w/ig sngl  4/21/2015     Performed by Reza Tabor at SURGERY [facility] ORS   • Recovery  1/27/2016     Procedure: CATH LAB EP STUDY WITH SINUS NODE MODIFICATION ABHINAV;  Surgeon: Recoveryonly Surgery;  [place] [facility] [place]  Service:    • Katie by laparoscopy  8/29/2010     Performed by SHAYY JOHNS at SURGERY [facility] ORS   • Lumbar fusion anterior  8/21/2012     Performed by SUSIE SAWANT at [facility]   • Other cardiac surgery  1/2016     cardiac ablation   • Tonsillectomy       tonsillectomy   • Bowel stimulator insertion  7/13/2016     Procedure: BOWEL STIMULATOR INSERTION FOR PERMANENT INTERSTIM SACRAL IMPLANT;  Surgeon: Joe Noyola M.D.;  Location: SURGERY [facility];  Service:    • Gastroscopy with balloon dilatation N/A 1/4/2017     Procedure: GASTROSCOPY WITH DILATATION;  Surgeon: Torres Vargas M.D.;  Location: [facility];  Service:    • Muscle biopsy Right 1/26/2017     " "Procedure: MUSCLE BIOPSY - THIGH;  Surgeon: Isidro Vigil M.D.;  Location: SURGERY [facility];  Service:    • Other abdominal surgery         ROS:  As documented above in the history of present illness.    /80 mmHg  Pulse 71  Temp(Src) 36.8 °C (98.2 °F)  Resp 16  Ht 1.575 m (5' 2.01\")  Wt   SpO2 95%    Physical Exam:      GEN:  Alert, oriented, in no distress  HEENT;  PERRLA, EOMI  EXT:  Without cyanosis, clubbing. There is some mild swelling and there is tenderness to palpation over the dorsal surface of the left foot.  NEURO:  Cranial nerves II through XII intact.    Left foot x-ray is done which shows no fracture or dislocation, per my interpretation        Assessment and Plan:     1. Sprain of left foot, initial encounter  -Left foot elevation. She is already mostly nonweightbearing. Supportive measures.    2. Constipation, unspecified constipation type  -Resume using lactulose, MiraLAX. Counseled regarding increasing fluid intake and high fiber diet. I advised going to ER if she develops abdominal pain or is not able to have a bowel movement in the next few days.    3. Recurrent falls  -Discuss preventative measures with Dr. Brody who she will see tomorrow.        Please note that this dictation was created using voice recognition software. I have worked with consultants from the vendor as well as technical experts from [facility] to optimize the interface. I have made every reasonable attempt to correct obvious errors, but I expect there are errors of jun and possibly content that I did not discover before finalizing the note.    "

## 2017-07-05 NOTE — TELEPHONE ENCOUNTER
Future Appointments       Provider Department Center    7/5/2017 11:40 AM Neena Agosto M.D. [facility]    7/6/2017 6:45 AM [facility]     7/6/2017 7:20 AM Torres Brody M.D. [facility] [address]    7/14/2017 10:00 AM Ronny Burnette M.D. [facility] [address] [place] [address]    7/17/2017 10:00 AM Blanca Brantley [facility]    7/19/2017 8:40 AM Torres Kumar M.D. [facility]     8/11/2017 10:00 AM Sandoval Fox M.D. [facility] Neurology     8/23/2017 9:00 AM Torres Brody M.D. [place] [address]        ESTABLISHED PATIENT PRE-VISIT PLANNING     Note: Patient will not be contacted if there is no indication to call.     1.  Reviewed note from last office visit with PCP and/or other med group provider: Yes    2.  If any orders were placed at last visit, do we have Results/Consult Notes?        •  Labs - Labs ordered, completed and results are in chart.       •  Imaging - Imaging was not ordered at last office visit.       •  Referrals - Referral ordered, patient has NOT been seen.    3.  Immunizations were updated in [facility] using Web[employer]?: Yes       •  Web [employer] Recommendations: HEPATITIS A  VARICELLA (Chicken Pox)     4.  Patient is due for the following Health Maintenance Topics:   Health Maintenance Due   Topic Date Due   • IMM HEP A VACCINE (1 of 2 - Standard Series) 10/13/1990   • IMM VARICELLA (CHICKENPOX) VACCINE (1 of 2 - 2 Dose Adolescent Series) 10/13/2002           5.  Patient was not informed to arrive 15 min prior to their scheduled appointment and bring in their medication bottles.

## 2017-07-06 ENCOUNTER — HOSPITAL ENCOUNTER (OUTPATIENT)
Dept: LAB | Facility: MEDICAL CENTER | Age: 27
End: 2017-07-06
Attending: INTERNAL MEDICINE
Payer: MEDICARE

## 2017-07-06 ENCOUNTER — HOME HEALTH ADMISSION (OUTPATIENT)
Dept: HOME HEALTH SERVICES | Facility: HOME HEALTHCARE | Age: 27
End: 2017-07-06
Payer: MEDICARE

## 2017-07-06 ENCOUNTER — HOME CARE VISIT (OUTPATIENT)
Dept: HOME HEALTH SERVICES | Facility: HOME HEALTHCARE | Age: 27
End: 2017-07-06

## 2017-07-06 ENCOUNTER — OFFICE VISIT (OUTPATIENT)
Dept: MEDICAL GROUP | Facility: MEDICAL CENTER | Age: 27
End: 2017-07-06
Payer: MEDICARE

## 2017-07-06 VITALS
SYSTOLIC BLOOD PRESSURE: 126 MMHG | HEART RATE: 73 BPM | TEMPERATURE: 97.9 F | OXYGEN SATURATION: 97 % | DIASTOLIC BLOOD PRESSURE: 86 MMHG | RESPIRATION RATE: 16 BRPM

## 2017-07-06 DIAGNOSIS — R73.02 IGT (IMPAIRED GLUCOSE TOLERANCE): ICD-10-CM

## 2017-07-06 DIAGNOSIS — G93.49 HASHIMOTO'S ENCEPHALOPATHY: ICD-10-CM

## 2017-07-06 DIAGNOSIS — E06.3 HASHIMOTO'S ENCEPHALOPATHY: ICD-10-CM

## 2017-07-06 DIAGNOSIS — R29.898 WEAKNESS OF BOTH LOWER EXTREMITIES: ICD-10-CM

## 2017-07-06 DIAGNOSIS — H92.01 EAR PAIN, RIGHT: ICD-10-CM

## 2017-07-06 LAB
ALBUMIN SERPL BCP-MCNC: 4.2 G/DL (ref 3.2–4.9)
ALBUMIN/GLOB SERPL: 1.8 G/DL
ALP SERPL-CCNC: 69 U/L (ref 30–99)
ALT SERPL-CCNC: 41 U/L (ref 2–50)
ANION GAP SERPL CALC-SCNC: 12 MMOL/L (ref 0–11.9)
AST SERPL-CCNC: 26 U/L (ref 12–45)
BILIRUB SERPL-MCNC: 0.5 MG/DL (ref 0.1–1.5)
BUN SERPL-MCNC: 8 MG/DL (ref 8–22)
CALCIUM SERPL-MCNC: 9.3 MG/DL (ref 8.5–10.5)
CHLORIDE SERPL-SCNC: 106 MMOL/L (ref 96–112)
CHOLEST SERPL-MCNC: 174 MG/DL (ref 100–199)
CO2 SERPL-SCNC: 24 MMOL/L (ref 20–33)
CREAT SERPL-MCNC: 0.75 MG/DL (ref 0.5–1.4)
EST. AVERAGE GLUCOSE BLD GHB EST-MCNC: 117 MG/DL
GFR SERPL CREATININE-BSD FRML MDRD: >60 ML/MIN/1.73 M 2
GLOBULIN SER CALC-MCNC: 2.4 G/DL (ref 1.9–3.5)
GLUCOSE SERPL-MCNC: 104 MG/DL (ref 65–99)
HBA1C MFR BLD: 5.7 % (ref 0–5.6)
HDLC SERPL-MCNC: 58 MG/DL
LDLC SERPL CALC-MCNC: 89 MG/DL
POTASSIUM SERPL-SCNC: 3.8 MMOL/L (ref 3.6–5.5)
PROT SERPL-MCNC: 6.6 G/DL (ref 6–8.2)
SODIUM SERPL-SCNC: 142 MMOL/L (ref 135–145)
TRIGL SERPL-MCNC: 134 MG/DL (ref 0–149)
TSH SERPL DL<=0.005 MIU/L-ACNC: 2.19 UIU/ML (ref 0.3–3.7)

## 2017-07-06 PROCEDURE — 99214 OFFICE O/P EST MOD 30 MIN: CPT | Performed by: INTERNAL MEDICINE

## 2017-07-06 PROCEDURE — 36415 COLL VENOUS BLD VENIPUNCTURE: CPT

## 2017-07-06 PROCEDURE — 84443 ASSAY THYROID STIM HORMONE: CPT

## 2017-07-06 PROCEDURE — 83036 HEMOGLOBIN GLYCOSYLATED A1C: CPT | Mod: GA

## 2017-07-06 PROCEDURE — 80053 COMPREHEN METABOLIC PANEL: CPT

## 2017-07-06 PROCEDURE — 80061 LIPID PANEL: CPT

## 2017-07-06 NOTE — PROGRESS NOTES
CC: Follow-up multiple issues    HPI:   Kristin presents today with the following.    1. Ear pain, right  Presents after going to urgent care being treated for pinkeye and an ear infection. She's only been on antibiotics for 3 days reporting that her ear is not changed significantly. She has no fevers or chills.    2. Weakness of both lower extremities  She is reporting that persistently weak and having multiple falls at home requiring rems. She would like to get into physical therapy for lower strandy strengthening. Current Prevacid out of [facility] is seizures however no definitive evidence on EEGs.    3. IGT (impaired glucose tolerance)  Recent A1c of 5.5 daily Victoza and Januvia. Occasional nausea and she is now off steroids for treatment of her underlying undiagnosed muscular conditions.      Patient Active Problem List    Diagnosis Date Noted   • Paraparesis of both lower limbs (CMS-HCC) 01/24/2017     Priority: High   • Sinus tachycardia 10/31/2013     Priority: High   • Chronic inflammatory arthritis 05/23/2016     Priority: Medium   • Depression 10/28/2016     Priority: Low   • Schizophrenia (CMS-HCC) 10/27/2016     Priority: Low   • Psychosis, schizophrenia, simple ([facility]) 09/29/2016     Priority: Low     Class: Chronic   • TONYA on CPAP 03/07/2016     Priority: Low   • Acquired hypothyroidism 11/23/2015     Priority: Low   • PCOS (polycystic ovarian syndrome) 11/23/2015     Priority: Low   • Progressive focal motor weakness 06/28/2015     Priority: Low   • Fatty liver disease, nonalcoholic 01/19/2015     Priority: Low   • Anxiety 12/16/2014     Priority: Low   • Knee pain, right 02/13/2014     Priority: Low   • Neurogenic bladder 04/02/2011     Priority: Low   • Chronic UTI 09/18/2010     Priority: Low   • Multiple personality disorder 09/18/2010     Priority: Low   • IGT (impaired glucose tolerance) 07/06/2017   • Rash 06/09/2017   • Hashimoto's encephalopathy 05/17/2017   • UTI (urinary tract infection)  05/13/2017   • Weakness 05/13/2017   • Enterococcus faecalis infection 05/13/2017   • Fall at home 05/13/2017   • Dysuria 05/09/2017   • On home oxygen therapy 04/15/2017   • Chest pain 03/30/2017   • Weakness of right upper extremity 02/23/2017   • Leg weakness, bilateral 02/22/2017   • Chronic suprapubic catheter (CMS-HCC) 02/16/2017   • Weakness 01/22/2017   • Leg weakness 01/04/2017   • Hypovitaminosis D 11/29/2016   • HTN (hypertension) 11/01/2016   • Chronic pain syndrome 10/27/2016   • Bowel and bladder incontinence 10/27/2016   • Galactorrhea 07/22/2016   • Elevated sedimentation rate 06/27/2016   • Weakness of both lower extremities 06/22/2016   • Vitamin D deficiency 05/21/2016   • Morbidly obese (CMS-HCC) 03/07/2016   • Conversion disorder 03/07/2016   • Scoliosis 03/07/2016   • GERD (gastroesophageal reflux disease) 03/07/2016   • Peripheral neuropathy (CMS-HCC) 03/06/2016   • H/O prior ablation treatment 02/10/2016       Current Outpatient Prescriptions   Medication Sig Dispense Refill   • ziprasidone (GEODON) 80 MG Cap TAKE 2 CAPSULES BY MOUTH NIGHTLY AT BEDTIME 60 Cap 5   • GRALISE 600 MG Tab Take 600 mg by mouth.     • nitroGLYCERIN (NITROSTAT) 0.3 MG SL tablet PLACE 1 TABLET UNDER TONGUE IF NEEDED FOR CHEST PAIN EVERY 5 MINUTES FOR 3 DOSES AS DIRECTED  0   • sitagliptin (JANUVIA) 100 MG Tab Take 1 Tab by mouth every day. 30 Tab 6   • Mirabegron ER (MYRBETRIQ) 25 MG TABLET SR 24 HR Take 1 Tab by mouth every day.     • lactobacillus granules (LACTINEX/FLORANEX) Pack Take 1 Packet by mouth 3 times a day, with meals.     • oxycodone-acetaminophen (PERCOCET-10)  MG Tab Take 1-2 Tabs by mouth every 6 hours as needed for Severe Pain. Scheduled. 1 Tab 0   • omeprazole (PRILOSEC) 20 MG delayed-release capsule Take 20 mg by mouth 2 times a day.     • levothyroxine (SYNTHROID) 75 MCG Tab Take 75 mcg by mouth Every morning on an empty stomach.     • albuterol 108 (90 BASE) MCG/ACT Aero Soln inhalation  aerosol Inhale 2 Puffs by mouth every 6 hours as needed for Shortness of Breath.     • cyanocobalamin (VITAMIN B-12) 500 MCG Tab Take 1,000 mcg by mouth every day.     • lactulose 10 GM/15ML Solution Take 10 g by mouth 2 times a day as needed.     • furosemide (LASIX) 40 MG Tab Take 40 mg by mouth every day.     • gabapentin (NEURONTIN) 300 MG Cap Take 600 mg by mouth 3 times a day.     • Cranberry 400 MG Tab Take 2 Tabs by mouth every day.     • promethazine (PHENERGAN) 25 MG Tab Take 1 Tab by mouth every 6 hours as needed for Nausea/Vomiting. 30 Tab 6   • aspirin EC (ECOTRIN) 81 MG Tablet Delayed Response Take 1 Tab by mouth every day. 30 Tab 6   • baclofen (LIORESAL) 10 MG Tab Take 1 Tab by mouth 3 times a day. 90 Tab 6   • CRANBERRY PO Take 100,400 mg by mouth 2 times a day.     • ivabradine (CORLANOR) 5 MG Tab tablet Take 1 Tab by mouth 2 times a day, with meals. 60 Tab 6   • fluoxetine (PROZAC) 10 MG Cap TAKE ONE CAPSULE BY MOUTH ONCE A DAY 30 Cap 2   • ranitidine (ZANTAC) 150 MG Tab Take 150 mg by mouth 2 times a day.     • Melatonin 5 MG Tab Take 10 mg by mouth every bedtime.     • fentanyl (DURAGESIC) 25 MCG/HR PATCH 72 HR Apply 1 Patch to skin as directed every 72 hours.     • vitamin D, Ergocalciferol, (DRISDOL) 99937 UNITS Cap capsule Take 50,000 Units by mouth every Friday.     • cyanocobalamin (HM VITAMIN B12) 500 MCG tablet Take 1,000 mcg by mouth 2 times a day.     • sodium bicarbonate 325 MG Tab Take 2 Tabs by mouth 3 times a day.     • nitrofurantoin monohydr macro (MACROBID) 100 MG Cap Take 1 Cap by mouth 2 times a day, with meals. (Patient not taking: Reported on 7/6/2017) 8 Cap 0     No current facility-administered medications for this visit.         Allergies as of 07/06/2017 - Joe as Reviewed 07/06/2017   Allergen Reaction Noted   • Cefdinir Shortness of Breath and Itching 03/01/2016   • Depakote [divalproex sodium] Unspecified 06/14/2010   • Abilify Unspecified 01/17/2013   • Amitriptyline  Unspecified 10/31/2013   • Amoxicillin Rash 09/18/2010   • Ciprofloxacin Rash 12/17/2009   • Clindamycin Nausea 02/02/2011   • Doxycycline Vomiting and Nausea 08/15/2012   • Ees [erythromycin] Vomiting and Nausea 08/28/2010   • Flagyl [metronidazole hcl] Unspecified 03/31/2011   • Flomax [tamsulosin hydrochloride] Swelling 09/24/2009   • Metformin Unspecified 07/23/2013   • Sulfa drugs Hives and Rash 09/18/2010   • Tape Rash 08/15/2012   • Vancomycin Itching 07/10/2016   • Cephalexin [keflex] Rash 01/01/2017   • Erythromycin  03/30/2017   • Levofloxacin Unspecified 10/27/2016   • Metronidazole  03/30/2017   • Valproic acid  03/30/2017        ROS: As per HPI.    /86 mmHg  Pulse 73  Temp(Src) 36.6 °C (97.9 °F)  Resp 16  SpO2 97%    Physical Exam:  Gen:         Alert and oriented, No apparent distress.  Neck:        No Lymphadenopathy or Bruits.  Lungs:     Clear to auscultation bilaterally  CV:          Regular rate and rhythm. No murmurs, rubs or gallops.               Ext:          No clubbing, cyanosis, edema.      Assessment and Plan.   26 y.o. female with the following issues.    1. Ear pain, right  She will complete antibiotics not improving ENT.    2. Weakness of both lower extremities  I referred to home health. Have discontinued Ultram given that there is a question vickie about seizures. She will follow up with her psychiatrist regarding psychiatric medications.  - REFERRAL TO HOME HEALTH    3. IGT (impaired glucose tolerance)  Now that off of steroids discontinuing Victoza monitoring blood sugars at home recheck blood sugars in 3 months.

## 2017-07-06 NOTE — MR AVS SNAPSHOT
"        Kristin Balderrama   2017 7:20 AM   Office Visit   MRN: [id number]    Department:  [address] [place] [address]   Dept Phone:  [phone]    Description:  Female : 1990   Provider:  Torres Brody M.D.           Allergies as of 2017     Allergen Noted Reactions    Cefdinir 2016   Shortness of Breath, Itching    Tolerated 17    Depakote [Divalproex Sodium] 2010   Unspecified    Muscle spasms/muscle pain and weakness      Abilify 2013   Unspecified    Headaches/muscle twitching      Amitriptyline 10/31/2013   Unspecified    Headaches      Amoxicillin 2010   Rash    Pt states \"I get a rash\".      Ciprofloxacin 2009   Rash    Pt states \"I get a rash\".      Clindamycin 2011   Nausea    Even with food      Doxycycline 08/15/2012   Vomiting, Nausea    RXN=unknown    Ees [Erythromycin] 2010   Vomiting, Nausea    Flagyl [Metronidazole Hcl] 2011   Unspecified    \"eye problems\"      Flomax [Tamsulosin Hydrochloride] 2009   Swelling    Metformin 2013   Unspecified    Increased lactic acid       Sulfa Drugs 2010   Hives, Rash    RXN=since childhood    Tape 08/15/2012   Rash    Tears skin off   coban with Tegaderm tape ok  RXN=ongoing    Vancomycin 07/10/2016   Itching    Pt becomes flushed in face and chest.   RXN=7/10/16    Cephalexin [Keflex] 2017   Rash    Pt states she gets a rash with this medication    Erythromycin 2017       Levofloxacin 10/27/2016   Unspecified    Leg muscle cramps    Metronidazole 2017       Valproic Acid 2017         You were diagnosed with     Ear pain, right   [768061]       Weakness of both lower extremities   [2670749]       IGT (impaired glucose tolerance)   [223024]         Vital Signs     Blood Pressure Pulse Temperature Respirations Oxygen Saturation Smoking Status    126/86 mmHg 73 36.6 °C (97.9 °F) 16 97% Never Smoker       Basic Information     Date Of Birth Sex Race Ethnicity " Preferred Language    1990 Female White Non- English      Your appointments     Jul 07, 2017  9:00 AM   FOLLOW UP with Torres Kumar M.D.   [facility] (--)    [address]  [place] [address]   [phone]            Jul 14, 2017 10:00 AM   Follow Up Med Management with Ronny Burnette M.D.   BE[facility]IORAL [facility] [address] [place] [address] ([facility])    [address] [facility]  [address]  [place] [address]   [phone]            Jul 17, 2017 10:00 AM   Individual Therapy with Blanca Brantley L.C.S.W.   [facility]AVIORAL [facility]    [address] [facility]  [address]  [place] [id number] [phone]            Aug 11, 2017 10:00 AM   Follow Up Visit with Sandoval Fox M.D.   [facility] (--)    [address], [address]  [place] [address] [phone]           You will be receiving a confirmation call a few days before your appointment from our automated call confirmation system.            Oct 06, 2017  7:20 AM   Established Patient with Torres Brody M.D.   [facility] [address]    [address] [facility]  [address]  [place] [address] [phone]           You will be receiving a confirmation call a few days before your appointment from our automated call confirmation system.              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 Low  9/18/2010 - Present    Neurogenic bladder N31.9 Low  4/2/2011 - Present    Sinus tachycardia (Chronic) R00.0 High  10/31/2013 - Present    Knee pain, right M25.561 Low  2/13/2014 - Present    Anxiety F41.9 Low  12/16/2014 - Present    Fatty liver disease, nonalcoholic K76.0 Low  1/19/2015 - Present    Progressive focal motor weakness R53.1 Low  6/28/2015 - Present    Acquired hypothyroidism E03.9 Low  11/23/2015 - Present    PCOS (polycystic ovarian syndrome) E28.2 Low   11/23/2015 - Present    H/O prior ablation treatment Z98.890   2/10/2016 - Present    Peripheral neuropathy (CMS-[facility]) (Chronic) G62.9   3/6/2016 - Present    TONYA on CPAP G47.33, Z99.89 Low  3/7/2016 - Present    Morbidly obese (CMS-HCC) E66.01   3/7/2016 - Present    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   3/7/2016 - Present    Scoliosis M41.9   3/7/2016 - Present    GERD (gastroesophageal reflux disease) (Chronic) K21.9   3/7/2016 - Present    Vitamin D deficiency E55.9   5/21/2016 - Present    Chronic inflammatory arthritis (Chronic) M19.90 Medium  5/23/2016 - Present    Weakness of both lower extremities R29.898   6/22/2016 - Present    Elevated sedimentation rate R70.0   6/27/2016 - Present    Galactorrhea O92.6   7/22/2016 - Present    Psychosis, schizophrenia, simple (HCC) (Chronic) F20.89 Low Chronic 9/29/2016 - Present    Schizophrenia (CMS-HCC) F20.9 Low  10/27/2016 - Present    Chronic pain syndrome (Chronic) G89.4   10/27/2016 - Present    Bowel and bladder incontinence R32, R15.9   10/27/2016 - Present    Depression F32.9 Low  10/28/2016 - Present    HTN (hypertension) (Chronic) I10   11/1/2016 - Present    Hypovitaminosis D E55.9   11/29/2016 - Present    Leg weakness R29.898   1/4/2017 - Present    Weakness R53.1   1/22/2017 - Present    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 High  1/24/2017 - Present    Chronic suprapubic catheter (CMS-HCC) (Chronic) Z93.59   2/16/2017 - Present    Leg weakness, bilateral R29.898   2/22/2017 - Present    Weakness of right upper extremity R29.898   2/23/2017 - Present    Chest pain R07.9   3/30/2017 - Present    On home oxygen therapy (Chronic) Z99.81   4/15/2017 - Present    Dysuria R30.0   5/9/2017 - Present    UTI (urinary tract infection) N39.0   5/13/2017 - Present    Weakness R53.1   5/13/2017 - Present    Enterococcus faecalis infection B95.2   5/13/2017 - Present    Fall at home W19.XXXA, Y92.099   5/13/2017 - Present    Hashimoto's encephalopathy  E06.3, G93.49   5/17/2017 - Present    Rash R21   6/9/2017 - Present    IGT (impaired glucose tolerance) R73.02   7/6/2017 - Present      Health Maintenance        Date Due Completion Dates    IMM HEP A VACCINE (1 of 2 - Standard Series) 10/13/1990 ---    IMM VARICELLA (CHICKENPOX) VACCINE (1 of 2 - 2 Dose Adolescent Series) 10/13/2002 ---    IMM INFLUENZA (1) 9/1/2017 10/5/2016, 9/5/2013, 12/7/2011, 11/7/2011    A1C SCREENING 12/28/2017 6/28/2017, 4/6/2017, 12/7/2016, 10/14/2016, 3/9/2016, 9/5/2014    FASTING LIPID PROFILE 3/7/2018 3/7/2017, 2/24/2017, 12/7/2016, 10/28/2016, 10/14/2016, 3/21/2014    URINE ACR / MICROALBUMIN 5/5/2018 5/5/2017, 3/7/2017, 12/7/2016, 10/14/2016, 7/28/2016    RETINAL SCREENING 5/23/2018 5/23/2017    DIABETES MONOFILAMENT / LE EXAM 5/23/2018 5/23/2017    SERUM CREATININE 6/21/2018 6/21/2017, 5/18/2017, 5/17/2017, 5/14/2017, 5/13/2017, 5/5/2017, 4/14/2017, 4/13/2017, 4/12/2017, 4/5/2017, 3/27/2017, 3/18/2017, 3/17/2017, 3/16/2017, 3/11/2017, 3/10/2017, 3/9/2017, 3/7/2017, 2/25/2017, 2/24/2017, 2/23/2017, 2/22/2017, 1/30/2017, 1/27/2017, 1/25/2017, 1/22/2017, 1/22/2017, 1/18/2017, 1/18/2017, 12/7/2016, 12/6/2016, 11/4/2016, 11/3/2016, 11/2/2016, 11/1/2016, 10/28/2016, 10/27/2016, 10/14/2016, 10/4/2016, 10/3/2016, 9/29/2016, 8/16/2016, 7/28/2016, 7/28/2016, 7/10/2016, 6/25/2016, 6/23/2016, 6/22/2016, 6/7/2016, 5/18/2016, 4/19/2016, 4/3/2016, 3/30/2016, 3/29/2016, 3/28/2016, 3/14/2016, 3/10/2016, 3/9/2016, 3/7/2016, 3/6/2016, 2/15/2016, 2/12/2016, 1/29/2016, 1/28/2016, 1/26/2016, 11/28/2015, 11/27/2015, 11/23/2015, 11/22/2015, 7/9/2015, 7/8/2015, 6/27/2015, 4/14/2015, 3/23/2015, 2/18/2015, 10/27/2014, 9/5/2014, 3/21/2014, 12/24/2013, 1/12/2013, 8/24/2012, 8/23/2012, 8/22/2012, 8/15/2012, 4/21/2012, 4/20/2012, 4/19/2012, 9/17/2011, 4/3/2011, 4/2/2011, 3/31/2011, 9/20/2010, 9/19/2010, 9/18/2010, 8/28/2010, 7/20/2010, 1/30/2007    PAP SMEAR 7/22/2019 7/22/2016 (Postponed), 2/19/2013 (N/S)     "Override on 7/22/2016: Postponed (per pt was told could \"skip\" 2016)    Override on 2/19/2013: (N/S) ([facility])    IMM DTaP/Tdap/Td Vaccine (7 - Td) 8/6/2022 8/6/2012, 9/18/2010, 3/3/1994, 5/17/1991, 5/10/1990, 3/23/1990, 1990    COLONOSCOPY 9/25/2023 9/25/2013            Current Immunizations     Dtap Vaccine 3/3/1994, 5/17/1991, 5/10/1990, 3/23/1990, 1990    HIB Vaccine(PEDVAX) 1/11/1991    HPV Quadrivalent Vaccine (GARDASIL) 10/27/2011, 5/27/2011, 3/1/2011    Hepatitis B Vaccine Non-Recombivax (Ped/Adol) 1/28/2004, 10/28/2003, 10/29/1999    Influenza TIV (IM) 9/5/2013, 12/7/2011, 11/7/2011    Influenza Vaccine Adult HD 10/5/2016    MMR Vaccine 3/3/1994, 1/11/1991    OPV - Historical Data 3/3/1994, 5/17/1991, 5/10/1990, 3/23/1990, 1990    Pneumococcal Vaccine (PCV7) Historical Data 11/8/2015    Pneumococcal polysaccharide vaccine (PPSV-23) 1/23/2017  5:35 PM, 1/14/2017    TD Vaccine 9/18/2010  4:45 PM    Tdap Vaccine 8/6/2012      Below and/or attached are the medications your provider expects you to take. Review all of your home medications and newly ordered medications with your provider and/or pharmacist. Follow medication instructions as directed by your provider and/or pharmacist. Please keep your medication list with you and share with your provider. Update the information when medications are discontinued, doses are changed, or new medications (including over-the-counter products) are added; and carry medication information at all times in the event of emergency situations     Allergies:  CEFDINIR - Shortness of Breath,Itching     DEPAKOTE - Unspecified     ABILIFY - Unspecified     AMITRIPTYLINE - Unspecified     AMOXICILLIN - Rash     CIPROFLOXACIN - Rash     CLINDAMYCIN - Nausea     DOXYCYCLINE - Vomiting,Nausea     EES - Vomiting,Nausea     FLAGYL - Unspecified     FLOMAX - Swelling     METFORMIN - Unspecified     SULFA DRUGS - Hives,Rash     TAPE - Rash     VANCOMYCIN - Itching     " CEPHALEXIN - Rash     ERYTHROMYCIN - (reactions not documented)     LEVOFLOXACIN - Unspecified     METRONIDAZOLE - (reactions not documented)     VALPROIC ACID - (reactions not documented)               Medications  Valid as of: July 06, 2017 -  7:29 AM    Generic Name Brand Name Tablet Size Instructions for use    Albuterol Sulfate (Aero Soln) albuterol 108 (90 BASE) MCG/ACT Inhale 2 Puffs by mouth every 6 hours as needed for Shortness of Breath.        Aspirin (Tablet Delayed Response) ECOTRIN 81 MG Take 1 Tab by mouth every day.        Baclofen (Tab) LIORESAL 10 MG Take 1 Tab by mouth 3 times a day.        Cranberry   Take 100,400 mg by mouth 2 times a day.        Cranberry (Tab) Cranberry 400 MG Take 2 Tabs by mouth every day.        Cyanocobalamin (Tab) vitamin b12 500 MCG Take 1,000 mcg by mouth 2 times a day.        Cyanocobalamin (Tab) VITAMIN B-12 500 MCG Take 1,000 mcg by mouth every day.        Ergocalciferol (Cap) DRISDOL 30614 UNITS Take 50,000 Units by mouth every Friday.        FentaNYL (PATCH 72 HR) DURAGESIC 25 MCG/HR Apply 1 Patch to skin as directed every 72 hours.        FLUoxetine HCl (Cap) PROZAC 10 MG TAKE ONE CAPSULE BY MOUTH ONCE A DAY        Furosemide (Tab) LASIX 40 MG Take 40 mg by mouth every day.        Gabapentin (Cap) NEURONTIN 300 MG Take 600 mg by mouth 3 times a day.        Gabapentin (Once-Daily) (Tab) GRALISE 600 MG Take 600 mg by mouth.        Ivabradine HCl (Tab) CORLANOR 5 MG Take 1 Tab by mouth 2 times a day, with meals.        Lactobacillus (Pack) LACTINEX/FLORANEX  Take 1 Packet by mouth 3 times a day, with meals.        Lactulose (Solution) lactulose 10 GM/15ML Take 10 g by mouth 2 times a day as needed.        Levothyroxine Sodium (Tab) SYNTHROID 75 MCG Take 75 mcg by mouth Every morning on an empty stomach.        Melatonin (Tab) Melatonin 5 MG Take 10 mg by mouth every bedtime.        Mirabegron (TABLET SR 24 HR) Mirabegron ER 25 MG Take 1 Tab by mouth every day.         Nitrofurantoin Monohyd Macro (Cap) MACROBID 100 MG Take 1 Cap by mouth 2 times a day, with meals.        Nitroglycerin (SL Tab) NITROSTAT 0.3 MG PLACE 1 TABLET UNDER TONGUE IF NEEDED FOR CHEST PAIN EVERY 5 MINUTES FOR 3 DOSES AS DIRECTED        Omeprazole (CAPSULE DELAYED RELEASE) PRILOSEC 20 MG Take 20 mg by mouth 2 times a day.        Oxycodone-Acetaminophen (Tab) PERCOCET-10  MG Take 1-2 Tabs by mouth every 6 hours as needed for Severe Pain. Scheduled.        Promethazine HCl (Tab) PHENERGAN 25 MG Take 1 Tab by mouth every 6 hours as needed for Nausea/Vomiting.        RaNITidine HCl (Tab) ZANTAC 150 MG Take 150 mg by mouth 2 times a day.        SITagliptin Phosphate (Tab) JANUVIA 100 MG Take 1 Tab by mouth every day.        Sodium Bicarbonate (Tab) sodium bicarbonate 325 MG Take 2 Tabs by mouth 3 times a day.        Ziprasidone HCl (Cap) GEODON 80 MG TAKE 2 CAPSULES BY MOUTH NIGHTLY AT BEDTIME        .                 Medicines prescribed today were sent to:     [id number] - [place], [place] - [address] [address]    Phone: [phone] Fax: [phone]    Open 24 Hours?: No      Medication refill instructions:       If your prescription bottle indicates you have medication refills left, it is not necessary to call your provider’s office. Please contact your pharmacy and they will refill your medication.    If your prescription bottle indicates you do not have any refills left, you may request refills at any time through one of the following ways: The online Outsell system (except Urgent Care), by calling your provider’s office, or by asking your pharmacy to contact your provider’s office with a refill request. Medication refills are processed only during regular business hours and may not be available until the next business day. Your provider may request additional information or to have a follow-up visit with you prior to refilling your medication.   *Please  Note: Medication refills are assigned a new Rx number when refilled electronically. Your pharmacy may indicate that no refills were authorized even though a new prescription for the same medication is available at the pharmacy. Please request the medicine by name with the pharmacy before contacting your provider for a refill.        Referral     A referral request has been sent to our patient care coordination department. Please allow 3-5 business days for us to process this request and contact you either by phone or mail. If you do not hear from us by the 5th business day, please call us at [phone].        Other Notes About Your Plan     DME:  [facility] / ph [phone] / fax [phone]              MyChart Access Code: Activation code not generated  Current MyChart Status: Active

## 2017-07-07 ENCOUNTER — HOME CARE VISIT (OUTPATIENT)
Dept: HOME HEALTH SERVICES | Facility: HOME HEALTHCARE | Age: 27
End: 2017-07-07
Payer: MEDICARE

## 2017-07-07 PROCEDURE — G0162 HHC RN E&M PLAN SVS, 15 MIN: HCPCS

## 2017-07-07 PROCEDURE — 665999 HH PPS REVENUE DEBIT

## 2017-07-07 PROCEDURE — 665001 SOC-HOME HEALTH

## 2017-07-07 PROCEDURE — 665998 HH PPS REVENUE CREDIT

## 2017-07-07 SDOH — ECONOMIC STABILITY: HOUSING INSECURITY: UNSAFE APPLIANCES: 0

## 2017-07-07 SDOH — ECONOMIC STABILITY: HOUSING INSECURITY
HOME SAFETY: OXYGEN SAFETY RISK ASSESSMENT PERFORMED. PATIENT DOES HAVE A NO SMOKING SIGN POSTED IN THE HOME. PATIENT DOES NOT HAVE A WORKING FIRE EXTINGUISHER PRESENT IN THE HOME. SMOKE ALARMS ARE PRESENT AND FUNCTIONAL ON EACH LEVEL OF THE HOME. PATIENT DOES HA

## 2017-07-07 SDOH — ECONOMIC STABILITY: HOUSING INSECURITY: UNSAFE COOKING RANGE AREA: 0

## 2017-07-07 SDOH — ECONOMIC STABILITY: HOUSING INSECURITY
HOME SAFETY: VE A FIRE ESCAPE PLAN DEVELOPED. PATIENT DOES NOT HAVE FLAMMABLE MATERIALS PRESENT IN THE HOME PRESENTING A FIRE HAZARD. NO EVIDENCE FOUND OF SMOKING MATERIALS PRESENT IN THE HOME.

## 2017-07-07 ASSESSMENT — PATIENT HEALTH QUESTIONNAIRE - PHQ9: 1. LITTLE INTEREST OR PLEASURE IN DOING THINGS: 00

## 2017-07-07 ASSESSMENT — ENCOUNTER SYMPTOMS
DEPRESSED MOOD: 1
VOMITING: DENIES
ADDITIONAL INFORMATION: CHRONIC
NAUSEA: DENIES

## 2017-07-07 ASSESSMENT — ACTIVITIES OF DAILY LIVING (ADL)
HOME_HEALTH_OASIS: 01
OASIS_M1830: 03

## 2017-07-08 ENCOUNTER — APPOINTMENT (OUTPATIENT)
Dept: RADIOLOGY | Facility: IMAGING CENTER | Age: 27
End: 2017-07-08
Attending: PHYSICIAN ASSISTANT
Payer: MEDICARE

## 2017-07-08 ENCOUNTER — OFFICE VISIT (OUTPATIENT)
Dept: URGENT CARE | Facility: CLINIC | Age: 27
End: 2017-07-08
Payer: MEDICARE

## 2017-07-08 VITALS
OXYGEN SATURATION: 96 % | TEMPERATURE: 98.6 F | HEART RATE: 88 BPM | DIASTOLIC BLOOD PRESSURE: 88 MMHG | SYSTOLIC BLOOD PRESSURE: 124 MMHG | RESPIRATION RATE: 18 BRPM

## 2017-07-08 DIAGNOSIS — H92.03 OTALGIA OF BOTH EARS: ICD-10-CM

## 2017-07-08 DIAGNOSIS — R04.2 HEMOPTYSIS: ICD-10-CM

## 2017-07-08 PROCEDURE — 99214 OFFICE O/P EST MOD 30 MIN: CPT | Performed by: PHYSICIAN ASSISTANT

## 2017-07-08 PROCEDURE — 665998 HH PPS REVENUE CREDIT

## 2017-07-08 PROCEDURE — 665999 HH PPS REVENUE DEBIT

## 2017-07-08 PROCEDURE — 71020 DX-CHEST-2 VIEWS: CPT | Mod: TC | Performed by: PHYSICIAN ASSISTANT

## 2017-07-08 RX ORDER — FLUCONAZOLE 150 MG/1
TABLET ORAL
COMMUNITY
Start: 2017-06-12 | End: 2017-07-10

## 2017-07-08 RX ORDER — TOBRAMYCIN 3 MG/ML
SOLUTION/ DROPS OPHTHALMIC
COMMUNITY
Start: 2017-07-05 | End: 2017-07-12

## 2017-07-08 RX ORDER — PEN NEEDLE, DIABETIC 32GX 5/32"
NEEDLE, DISPOSABLE MISCELLANEOUS
COMMUNITY
Start: 2017-06-28 | End: 2017-07-24

## 2017-07-08 RX ORDER — TRAMADOL HYDROCHLORIDE 50 MG/1
TABLET ORAL
COMMUNITY
Start: 2017-07-01 | End: 2017-07-25

## 2017-07-08 ASSESSMENT — ENCOUNTER SYMPTOMS
FEVER: 0
NAUSEA: 0
CHILLS: 0
DIARRHEA: 0
VOMITING: 0
DEBILITATING PAIN: 1
WHEEZING: 0
SPUTUM PRODUCTION: 1
SHORTNESS OF BREATH: 0
MUSCULOSKELETAL NEGATIVE: 1
DIZZINESS: 0
HEMOPTYSIS: 1
COUGH: 1
HEADACHES: 0
ABDOMINAL PAIN: 0
SORE THROAT: 0

## 2017-07-08 ASSESSMENT — COPD QUESTIONNAIRES: COPD: 0

## 2017-07-08 NOTE — MR AVS SNAPSHOT
"        Kristin Balderrama   2017 9:15 AM   Office Visit   MRN: [id number]    Department:  [facility]   Dept Phone:  [phone]    Description:  Female : 1990   Provider:  Tatyana Rouse PA-C           Reason for Visit     Cough and ear infection, pt claims she is coughing blood and blood is coming out of both ears and she states the medication she is currently on is giving her an allergic reaction       Allergies as of 2017     Allergen Noted Reactions    Cefdinir 2016   Shortness of Breath, Itching    Tolerated 17    Depakote [Divalproex Sodium] 2010   Unspecified    Muscle spasms/muscle pain and weakness      Abilify 2013   Unspecified    Headaches/muscle twitching      Amitriptyline 10/31/2013   Unspecified    Headaches      Amoxicillin 2010   Rash    Pt states \"I get a rash\".      Ciprofloxacin 2009   Rash    Pt states \"I get a rash\".      Clindamycin 2011   Nausea    Even with food      Doxycycline 08/15/2012   Vomiting, Nausea    RXN=unknown    Ees [Erythromycin] 2010   Vomiting, Nausea    Flagyl [Metronidazole Hcl] 2011   Unspecified    \"eye problems\"      Flomax [Tamsulosin Hydrochloride] 2009   Swelling    Metformin 2013   Unspecified    Increased lactic acid       Sulfa Drugs 2010   Hives, Rash    RXN=since childhood    Tape 08/15/2012   Rash    Tears skin off   coban with Tegaderm tape ok  RXN=ongoing    Vancomycin 07/10/2016   Itching    Pt becomes flushed in face and chest.   RXN=7/10/16    Cephalexin [Keflex] 2017   Rash    Pt states she gets a rash with this medication    Erythromycin 2017       Levofloxacin 10/27/2016   Unspecified    Leg muscle cramps    Metronidazole 2017       Valproic Acid 2017         You were diagnosed with     Otalgia of both ears   [270175]       Hemoptysis   [6022256]         Vital Signs     Blood Pressure Pulse Temperature Respirations Oxygen Saturation " Smoking Status    124/88 mmHg 88 37 °C (98.6 °F) 18 96% Never Smoker       Basic Information     Date Of Birth Sex Race Ethnicity Preferred Language    1990 Female White Non- English      Your appointments     Jul 10, 2017 To Be Determined   PT-HH-INITIAL EVALUATION with Abhishek Baker P.T.   [facility] (--)    [address]  [place] [address]   [phone]            Jul 10, 2017 To Be Determined   AIDE-HH-HOME VISIT with Karla Marquez C.N.A.   [facility] (--)    [address]  [place] [address]   [phone]            Jul 11, 2017 To Be Determined   SN-HH-HOME VISIT with Sinai Hooker R.N.   [facility] (--)    [address]  [place] [address]   [phone]            Jul 12, 2017 To Be Determined   AIDE-HH-HOME VISIT with [facility] (--)    [address]  [place] [address]   [phone]            Jul 14, 2017 To Be Determined   AIDE-HH-HOME VISIT with Karla Marquez C.N.A.   [facility] (--)    [address]  [place] [address]   [phone]            Jul 14, 2017 10:00 AM   Follow Up Med Management with Ronny Burnette M.D.   [facility] [address] [place] [address])    [address] [place] [address]  [address] [facility]  [place] [address]   [phone]            Jul 14, 2017 To Be Determined   SN-HH-HOME VISIT with [facility] TE[facility] (--)    [address]  [place] [address]   [phone]            Jul 17, 2017 To Be Determined   AIDE-HH-HOME VISIT with Karla Marquez C.N.A.   [facility] (--)    [address]  [place] [address]   [phone]            Jul 17, 2017 10:00 AM   Individual Therapy with JUANITA StatonSEffieWEffie   [facility] ([facility])    [address]  [address]  [place] [address] [phone]            Jul 18, 2017 To Be Determined   SN-HH-HOME VISIT with [facility] TE[facility] (--)    [address]  [place] [address] [phone]               Jul 19, 2017 To Be Determined   AIDE-HH-HOME VISIT with [facility] TE[facility] (--)    [address]  [place] [address]   [phone]            Jul 21, 2017 To Be Determined   SN-HH-HOME VISIT with [facility] TE[facility] (--)    [address]  [place] [address]   [phone]            Jul 21, 2017 To Be Determined   AIDE-HH-HOME VISIT with [facility] TE[facility] (--)    [address]  [place] [address]   [phone]            Jul 24, 2017 To Be Determined   AIDE-HH-HOME VISIT with [facility] TE[facility] (--)    [address]  [place] [address]   [phone]            Jul 25, 2017 To Be Determined   SN-HH-HOME VISIT with Joe Ramirez R.N.   [facility] (--)    [address]  [place] [address]   [phone]            Jul 26, 2017 To Be Determined   AIDE-HH-HOME VISIT with Karla Marquez C.N.A.   [facility] (--)    [address]  [place] [address]   [phone]            Jul 28, 2017 To Be Determined   SN-HH-HOME VISIT with Joe Ramirez R.N.   [facility] (--)    [address]  [place] [address]   [phone]            Jul 28, 2017 To Be Determined   AIDE-HH-HOME VISIT with Karla Marquez C.N.A.   [facility] (--)    [address]  [place] [address]   [phone]            Jul 31, 2017 To Be Determined   AIDE-HH-HOME VISIT with [facility] TEAM [facility] (--)    [address]  [place] [address]   [phone]            Aug 01, 2017 To Be Determined   SN-HH-HOME VISIT with Sinai Hooker R.N.   [facility] (--)    [facility]  [facility] [address]   [phone]            Aug 02, 2017 To Be Determined   AIDE-HH-HOME VISIT with Karla Marquez C.N.A.   [facility] (--)    [facility]  [facility] [address]   [phone]            Aug 04, 2017 To Be Determined   AIDE-HH-HOME VISIT with Karla Marquez C.N.A.   [facility]  (--)    [address]  [place] [address]   [phone]            Aug 07, 2017 To Be Determined   AIDE-HH-HOME VISIT with Karla Marquez C.N.A.   [facility] (--)    [address]  [place] [address]   [phone]            Aug 08, 2017 To Be Determined   SN-HH-HOME VISIT with Joe Ramirez R.N.   [facility] (--)    [address]  [place] [address]   [phone]            Aug 09, 2017 To Be Determined   AIDE-HH-HOME VISIT with Karla Marquez C.N.A.   [facility] (--)    [address]  [place] [address]   [phone]            Aug 11, 2017 10:00 AM   Follow Up Visit with Sandoval Fox M.D.   [facility] (--)    [address] [facility], [address]  [facility] [id number] [phone]           You will be receiving a confirmation call a few days before your appointment from our automated call confirmation system.            Aug 11, 2017 To Be Determined   AIDE-HH-HOME VISIT with Karla Marquez C.N.A.   [facility] (--)    [address]  [place] [address]   [phone]            Aug 14, 2017 To Be Determined   AIDE-HH-HOME VISIT with Karla Marquez C.N.A.   [facility] (--)    [address]  [place] [address]   [phone]            Aug 15, 2017 To Be Determined   SN-HH-HOME VISIT with Joe Ramirez R.N.   [facility] (--)    [address]  [place] [address]   [phone]            Aug 16, 2017 To Be Determined   AIDE-HH-HOME VISIT with Karla Marquez C.N.A.   [facility] (--)    [address]  [facility] [address]   [phone]            Aug 18, 2017 To Be Determined   AIDE-HH-HOME VISIT with Karla Marquez C.N.A.   [facility] (--)    [address]  [facility] [address]   [phone]            Aug 21, 2017 To Be Determined   [facility]    [address]  [facility] [address]   [phone]            Aug 22, 2017 To Be  Determined   SN-HH-HOME VISIT with Joe Ramirez R.N.   [facility] (--)    [address]  [place] [address]   [phone]            Aug 23, 2017 To Be Determined   AIDE-HH-HOME VISIT with Karla Marquez C.N.A.   [facility] (--)    [address]  [place] [address]   [phone]            Aug 25, 2017 To Be Determined   AIDE-HH-HOME VISIT with Karla Marquez C.N.A.   [facility] (--)    [address]  [place] [address]   [phone]            Aug 29, 2017 To Be Determined   SN-HH-HOME VISIT with Sinai Hooker R.N.   [facility] (--)    [address]  [place] [address]   [phone]            Oct 06, 2017  7:20 AM   Established Patient with Torres Brody M.D.   [facility] [address]    [address]  [address]  [place] [address]   [phone]           You will be receiving a confirmation call a few days before your appointment from our automated call confirmation system.              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 Low  9/18/2010 - Present    Neurogenic bladder N31.9 Low  4/2/2011 - Present    Sinus tachycardia (Chronic) R00.0 High  10/31/2013 - Present    Knee pain, right M25.561 Low  2/13/2014 - Present    Anxiety F41.9 Low  12/16/2014 - Present    Fatty liver disease, nonalcoholic K76.0 Low  1/19/2015 - Present    Progressive focal motor weakness R53.1 Low  6/28/2015 - Present    Acquired hypothyroidism E03.9 Low  11/23/2015 - Present    PCOS (polycystic ovarian syndrome) E28.2 Low  11/23/2015 - Present    H/O prior ablation treatment Z98.890   2/10/2016 - Present    Peripheral neuropathy (CMS-HCC) (Chronic) G62.9   3/6/2016 - Present    TONYA on CPAP G47.33, Z99.89 Low  3/7/2016 - Present    Morbidly obese (CMS-HCC) E66.01   3/7/2016 - Present    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   3/7/2016 -  Present    Scoliosis M41.9   3/7/2016 - Present    GERD (gastroesophageal reflux disease) (Chronic) K21.9   3/7/2016 - Present    Vitamin D deficiency E55.9   5/21/2016 - Present    Chronic inflammatory arthritis (Chronic) M19.90 Medium  5/23/2016 - Present    Weakness of both lower extremities R29.898   6/22/2016 - Present    Elevated sedimentation rate R70.0   6/27/2016 - Present    Galactorrhea O92.6   7/22/2016 - Present    Psychosis, schizophrenia, simple (HCC) (Chronic) F20.89 Low Chronic 9/29/2016 - Present    Schizophrenia (CMS-HCC) F20.9 Low  10/27/2016 - Present    Chronic pain syndrome (Chronic) G89.4   10/27/2016 - Present    Bowel and bladder incontinence R32, R15.9   10/27/2016 - Present    Depression F32.9 Low  10/28/2016 - Present    HTN (hypertension) (Chronic) I10   11/1/2016 - Present    Hypovitaminosis D E55.9   11/29/2016 - Present    Leg weakness R29.898   1/4/2017 - Present    Weakness R53.1   1/22/2017 - Present    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 High  1/24/2017 - Present    Chronic suprapubic catheter (CMS-HCC) (Chronic) Z93.59   2/16/2017 - Present    Leg weakness, bilateral R29.898   2/22/2017 - Present    Weakness of right upper extremity R29.898   2/23/2017 - Present    Chest pain R07.9   3/30/2017 - Present    On home oxygen therapy (Chronic) Z99.81   4/15/2017 - Present    Dysuria R30.0   5/9/2017 - Present    UTI (urinary tract infection) N39.0   5/13/2017 - Present    Weakness R53.1   5/13/2017 - Present    Enterococcus faecalis infection B95.2   5/13/2017 - Present    Fall at home W19.XXXA, Y92.099   5/13/2017 - Present    Hashimoto's encephalopathy E06.3, G93.49   5/17/2017 - Present    Rash R21   6/9/2017 - Present    IGT (impaired glucose tolerance) R73.02   7/6/2017 - Present      Health Maintenance        Date Due Completion Dates    IMM HEP A VACCINE (1 of 2 - Standard Series) 10/13/1990 ---    IMM VARICELLA (CHICKENPOX) VACCINE (1 of 2 - 2 Dose Adolescent  "Series) 10/13/2002 ---    IMM INFLUENZA (1) 9/1/2017 10/5/2016, 9/5/2013, 12/7/2011, 11/7/2011    A1C SCREENING 1/6/2018 7/6/2017, 6/28/2017, 4/6/2017, 12/7/2016, 10/14/2016, 3/9/2016, 9/5/2014    URINE ACR / MICROALBUMIN 5/5/2018 5/5/2017, 3/7/2017, 12/7/2016, 10/14/2016, 7/28/2016    RETINAL SCREENING 5/23/2018 5/23/2017    DIABETES MONOFILAMENT / LE EXAM 5/23/2018 5/23/2017    FASTING LIPID PROFILE 7/6/2018 7/6/2017, 3/7/2017, 2/24/2017, 12/7/2016, 10/28/2016, 10/14/2016, 3/21/2014    SERUM CREATININE 7/6/2018 7/6/2017, 6/21/2017, 5/18/2017, 5/17/2017, 5/14/2017, 5/13/2017, 5/5/2017, 4/14/2017, 4/13/2017, 4/12/2017, 4/5/2017, 3/27/2017, 3/18/2017, 3/17/2017, 3/16/2017, 3/11/2017, 3/10/2017, 3/9/2017, 3/7/2017, 2/25/2017, 2/24/2017, 2/23/2017, 2/22/2017, 1/30/2017, 1/27/2017, 1/25/2017, 1/22/2017, 1/22/2017, 1/18/2017, 1/18/2017, 12/7/2016, 12/6/2016, 11/4/2016, 11/3/2016, 11/2/2016, 11/1/2016, 10/28/2016, 10/27/2016, 10/14/2016, 10/4/2016, 10/3/2016, 9/29/2016, 8/16/2016, 7/28/2016, 7/28/2016, 7/10/2016, 6/25/2016, 6/23/2016, 6/22/2016, 6/7/2016, 5/18/2016, 4/19/2016, 4/3/2016, 3/30/2016, 3/29/2016, 3/28/2016, 3/14/2016, 3/10/2016, 3/9/2016, 3/7/2016, 3/6/2016, 2/15/2016, 2/12/2016, 1/29/2016, 1/28/2016, 1/26/2016, 11/28/2015, 11/27/2015, 11/23/2015, 11/22/2015, 7/9/2015, 7/8/2015, 6/27/2015, 4/14/2015, 3/23/2015, 2/18/2015, 10/27/2014, 9/5/2014, 3/21/2014, 12/24/2013, 1/12/2013, 8/24/2012, 8/23/2012, 8/22/2012, 8/15/2012, 4/21/2012, 4/20/2012, 4/19/2012, 9/17/2011, 4/3/2011, 4/2/2011, 3/31/2011, 9/20/2010, 9/19/2010, 9/18/2010, 8/28/2010, 7/20/2010, 1/30/2007    PAP SMEAR 7/22/2019 7/22/2016 (Postponed), 2/19/2013 (N/S)    Override on 7/22/2016: Postponed (per pt was told could \"skip\" 2016)    Override on 2/19/2013: (N/S) ([place])    IMM DTaP/Tdap/Td Vaccine (7 - Td) 8/6/2022 8/6/2012, 9/18/2010, 3/3/1994, 5/17/1991, 5/10/1990, 3/23/1990, 1990    COLONOSCOPY 9/25/2023 9/25/2013            Current " Immunizations     Dtap Vaccine 3/3/1994, 5/17/1991, 5/10/1990, 3/23/1990, 1990    HIB Vaccine(PEDVAX) 1/11/1991    HPV Quadrivalent Vaccine (GARDASIL) 10/27/2011, 5/27/2011, 3/1/2011    Hepatitis B Vaccine Non-Recombivax (Ped/Adol) 1/28/2004, 10/28/2003, 10/29/1999    Influenza TIV (IM) 9/5/2013, 12/7/2011, 11/7/2011    Influenza Vaccine Adult HD 10/5/2016    MMR Vaccine 3/3/1994, 1/11/1991    OPV - Historical Data 3/3/1994, 5/17/1991, 5/10/1990, 3/23/1990, 1990    Pneumococcal Vaccine (PCV7) Historical Data 11/8/2015    Pneumococcal polysaccharide vaccine (PPSV-23) 1/23/2017  5:35 PM, 1/14/2017    TD Vaccine 9/18/2010  4:45 PM    Tdap Vaccine 8/6/2012      Below and/or attached are the medications your provider expects you to take. Review all of your home medications and newly ordered medications with your provider and/or pharmacist. Follow medication instructions as directed by your provider and/or pharmacist. Please keep your medication list with you and share with your provider. Update the information when medications are discontinued, doses are changed, or new medications (including over-the-counter products) are added; and carry medication information at all times in the event of emergency situations     Allergies:  CEFDINIR - Shortness of Breath,Itching     DEPAKOTE - Unspecified     ABILIFY - Unspecified     AMITRIPTYLINE - Unspecified     AMOXICILLIN - Rash     CIPROFLOXACIN - Rash     CLINDAMYCIN - Nausea     DOXYCYCLINE - Vomiting,Nausea     EES - Vomiting,Nausea     FLAGYL - Unspecified     FLOMAX - Swelling     METFORMIN - Unspecified     SULFA DRUGS - Hives,Rash     TAPE - Rash     VANCOMYCIN - Itching     CEPHALEXIN - Rash     ERYTHROMYCIN - (reactions not documented)     LEVOFLOXACIN - Unspecified     METRONIDAZOLE - (reactions not documented)     VALPROIC ACID - (reactions not documented)               Medications  Valid as of: July 08, 2017 - 12:12 PM    Generic Name Brand Name Tablet  Size Instructions for use    Albuterol Sulfate (Aero Soln) albuterol 108 (90 BASE) MCG/ACT Inhale 2 Puffs by mouth every 6 hours as needed for Shortness of Breath.        Aspirin (Tablet Delayed Response) ECOTRIN 81 MG Take 1 Tab by mouth every day.        Baclofen (Tab) LIORESAL 10 MG Take 1 Tab by mouth 3 times a day.        Cefdinir (Cap) OMNICEF 300 MG Take 300 mg by mouth 2 times a day. Indications: Acute Infection of the Middle Ear        Cranberry (Tab) Cranberry 400 MG Take 2 Tabs by mouth every day.        Cyanocobalamin (Tab) vitamin b12 500 MCG Take 1,000 mcg by mouth 2 times a day.        Ergocalciferol (Cap) DRISDOL 32416 UNITS Take 50,000 Units by mouth every Friday.        FentaNYL (PATCH 72 HR) DURAGESIC 25 MCG/HR Apply 1 Patch to skin as directed every 72 hours.        Fluconazole (Tab) DIFLUCAN 150 MG         FLUoxetine HCl (Cap) PROZAC 10 MG TAKE ONE CAPSULE BY MOUTH ONCE A DAY        Furosemide (Tab) LASIX 40 MG Take 40 mg by mouth every day.        Gabapentin (Cap) NEURONTIN 300 MG Take 600 mg by mouth 3 times a day.        Gabapentin (Once-Daily) (Tab) GRALISE 600 MG Take 600 mg by mouth.        Insulin Pen Needle (Misc) BD PEN NEEDLE MALLY U/F 32G X 4 MM         Ivabradine HCl (Tab) CORLANOR 5 MG Take 1 Tab by mouth 2 times a day, with meals.        Lactobacillus (Pack) LACTINEX/FLORANEX  Take 1 Packet by mouth 3 times a day, with meals.        Lactulose (Solution) lactulose 10 GM/15ML Take 10 g by mouth 2 times a day as needed.        Levothyroxine Sodium (Tab) SYNTHROID 75 MCG Take 75 mcg by mouth Every morning on an empty stomach.        Melatonin (Tab) Melatonin 5 MG Take 10 mg by mouth every bedtime.        Mirabegron (TABLET SR 24 HR) Mirabegron ER 25 MG Take 1 Tab by mouth every day.        Nitrofurantoin Monohyd Macro (Cap) MACROBID 100 MG Take 1 Cap by mouth 2 times a day, with meals.        Nitroglycerin (SL Tab) NITROSTAT 0.3 MG PLACE 1 TABLET UNDER TONGUE IF NEEDED FOR CHEST PAIN  EVERY 5 MINUTES FOR 3 DOSES AS DIRECTED        Oxycodone-Acetaminophen (Tab) PERCOCET-10  MG Take 1-2 Tabs by mouth every 6 hours as needed for Severe Pain. Scheduled.        Promethazine HCl (Tab) PHENERGAN 25 MG Take 1 Tab by mouth every 6 hours as needed for Nausea/Vomiting.        SITagliptin Phosphate (Tab) JANUVIA 100 MG Take 1 Tab by mouth every day.        Sodium Bicarbonate (Tab) sodium bicarbonate 325 MG Take 2 Tabs by mouth 3 times a day.        Tobramycin (Solution) TOBREX 0.3 %         TraMADol HCl (Tab) ULTRAM 50 MG         Ziprasidone HCl (Cap) GEODON 80 MG TAKE 2 CAPSULES BY MOUTH NIGHTLY AT BEDTIME        .                 Medicines prescribed today were sent to:     [id number] - [place], [place] - [address] [address]    Phone: [phone] Fax: [phone]    Open 24 Hours?: No      Medication refill instructions:       If your prescription bottle indicates you have medication refills left, it is not necessary to call your provider’s office. Please contact your pharmacy and they will refill your medication.    If your prescription bottle indicates you do not have any refills left, you may request refills at any time through one of the following ways: The online [employer] system (except Urgent Care), by calling your provider’s office, or by asking your pharmacy to contact your provider’s office with a refill request. Medication refills are processed only during regular business hours and may not be available until the next business day. Your provider may request additional information or to have a follow-up visit with you prior to refilling your medication.   *Please Note: Medication refills are assigned a new Rx number when refilled electronically. Your pharmacy may indicate that no refills were authorized even though a new prescription for the same medication is available at the pharmacy. Please request the medicine by name with the pharmacy before  contacting your provider for a refill.        Your To Do List     Future Labs/Procedures Complete By Expires    DX-CHEST-2 VIEWS  As directed 7/8/2018      Other Notes About Your Plan     DME:  [facility] / ph [phone] / fax [phone]              MyChart Access Code: Activation code not generated  Current MyChart Status: Active

## 2017-07-08 NOTE — PROGRESS NOTES
"Subjective:      Kristin Balderrama is a 26 y.o. female who presents with Cough and otalgia.             Cough  This is a new problem. The current episode started in the past 7 days (4 days). The problem has been gradually worsening. The cough is productive of blood-tinged sputum. Associated symptoms include ear pain, hemoptysis and nasal congestion. Pertinent negatives include no chest pain, chills, fever, headaches, sore throat, shortness of breath or wheezing. Nothing aggravates the symptoms. She has tried nothing for the symptoms. There is no history of asthma, COPD or pneumonia.     Patient presents to urgent care reporting ongoing otalgia of both ears and a cough. She was originally seen 4 days ago for the otalgia and started on omnicef. Since her original visit, she has been seen multiple times by urgent care, med group, and her ENT specialist. She states her ENT \"cleaned\" out her right ear, but didn't recommend any other therapy. She states her left ear has started to bleed. She also reports a productive cough for the past 4 days that has recently become blood tinged. Patient has an extensive medical history including schizophrenia. She is a very poor historian and gives affirmative answers to all questions asked. She reports history of tuberculosis, treated at age [age].      Review of Systems   Constitutional: Negative for fever and chills.   HENT: Positive for ear discharge and ear pain. Negative for sore throat.    Respiratory: Positive for cough, hemoptysis and sputum production. Negative for shortness of breath and wheezing.    Cardiovascular: Negative for chest pain.   Gastrointestinal: Negative for nausea, vomiting, abdominal pain and diarrhea.   Genitourinary: Negative.    Musculoskeletal: Negative.    Neurological: Negative for dizziness and headaches.          Objective:     /88 mmHg  Pulse 88  Temp(Src) 37 °C (98.6 °F)  Resp 18  SpO2 96%     Physical Exam   Constitutional: She is " oriented to person, place, and time. She appears well-developed and well-nourished. No distress.   HENT:   Head: Normocephalic and atraumatic.   Right Ear: Hearing, tympanic membrane, external ear and ear canal normal.   Left Ear: Hearing, tympanic membrane, external ear and ear canal normal.   Nose: Nose normal.   Mouth/Throat: Oropharynx is clear and moist.   Right ear canal erythematous without any discharge noted. TM non-erythematous or bulging with tympanostomy tube in place.     Left ear canal erythematous without any discharge noted. TM non-erythematous or bulging. No perforation or tympanostomy tube present.    Eyes: Conjunctivae and lids are normal. Pupils are equal, round, and reactive to light. Right eye exhibits no discharge. Left eye exhibits no discharge. Right conjunctiva is not injected. Left conjunctiva is not injected.   Neck: Normal range of motion.   Cardiovascular: Normal rate, regular rhythm and normal heart sounds.    No murmur heard.  Pulmonary/Chest: Effort normal and breath sounds normal. No respiratory distress. She has no wheezes. She has no rales.   Musculoskeletal: Normal range of motion.   Neurological: She is alert and oriented to person, place, and time.   Skin: Skin is warm and dry. She is not diaphoretic.   Psychiatric: She has a normal mood and affect. Her behavior is normal.   Nursing note and vitals reviewed.         PMH:  has a past medical history of Scoliosis; Multiple personality disorder; Chronic UTI (9/18/2010); Heart burn; Pain (08-15-12); History of falling; Sinus tachycardia (10/31/2013); Urinary incontinence; Hypertension; Disorder of thyroid; Obesity; Pneumonia (2012); ASTHMA; Breath shortness; Anginal syndrome; Psychosis; Arthritis; PCOS (polycystic ovarian syndrome); Gynecological disorder; Renal disorder; Arrhythmia; Urinary bladder disorder; Tuberculosis; Sleep apnea; Mitochondrial disease; and Fall.  MEDS:   Current outpatient prescriptions:   •  BD PEN NEEDLE  MALLY U/F 32G X 4 MM Misc, , Disp: , Rfl:   •  tobramycin (TOBREX) 0.3 % Solution ophthalmic solution, , Disp: , Rfl:   •  cefdinir (OMNICEF) 300 MG Cap, Take 300 mg by mouth 2 times a day. Indications: Acute Infection of the Middle Ear, Disp: , Rfl:   •  ziprasidone (GEODON) 80 MG Cap, TAKE 2 CAPSULES BY MOUTH NIGHTLY AT BEDTIME, Disp: 60 Cap, Rfl: 5  •  GRALISE 600 MG Tab, Take 600 mg by mouth., Disp: , Rfl:   •  sitagliptin (JANUVIA) 100 MG Tab, Take 1 Tab by mouth every day., Disp: 30 Tab, Rfl: 6  •  Mirabegron ER (MYRBETRIQ) 25 MG TABLET SR 24 HR, Take 1 Tab by mouth every day., Disp: , Rfl:   •  lactobacillus granules (LACTINEX/FLORANEX) Pack, Take 1 Packet by mouth 3 times a day, with meals., Disp: , Rfl:   •  oxycodone-acetaminophen (PERCOCET-10)  MG Tab, Take 1-2 Tabs by mouth every 6 hours as needed for Severe Pain. Scheduled. (Patient taking differently: Take 2 Tabs by mouth 2 times a day. Scheduled.), Disp: 1 Tab, Rfl: 0  •  levothyroxine (SYNTHROID) 75 MCG Tab, Take 75 mcg by mouth Every morning on an empty stomach., Disp: , Rfl:   •  albuterol 108 (90 BASE) MCG/ACT Aero Soln inhalation aerosol, Inhale 2 Puffs by mouth every 6 hours as needed for Shortness of Breath., Disp: , Rfl:   •  lactulose 10 GM/15ML Solution, Take 10 g by mouth 2 times a day as needed., Disp: , Rfl:   •  furosemide (LASIX) 40 MG Tab, Take 40 mg by mouth every day., Disp: , Rfl:   •  gabapentin (NEURONTIN) 300 MG Cap, Take 600 mg by mouth 3 times a day., Disp: , Rfl:   •  Cranberry 400 MG Tab, Take 2 Tabs by mouth every day., Disp: , Rfl:   •  promethazine (PHENERGAN) 25 MG Tab, Take 1 Tab by mouth every 6 hours as needed for Nausea/Vomiting., Disp: 30 Tab, Rfl: 6  •  aspirin EC (ECOTRIN) 81 MG Tablet Delayed Response, Take 1 Tab by mouth every day., Disp: 30 Tab, Rfl: 6  •  baclofen (LIORESAL) 10 MG Tab, Take 1 Tab by mouth 3 times a day., Disp: 90 Tab, Rfl: 6  •  ivabradine (CORLANOR) 5 MG Tab tablet, Take 1 Tab by mouth 2  "times a day, with meals., Disp: 60 Tab, Rfl: 6  •  fluoxetine (PROZAC) 10 MG Cap, TAKE ONE CAPSULE BY MOUTH ONCE A DAY, Disp: 30 Cap, Rfl: 2  •  Melatonin 5 MG Tab, Take 10 mg by mouth every bedtime., Disp: , Rfl:   •  fentanyl (DURAGESIC) 25 MCG/HR PATCH 72 HR, Apply 1 Patch to skin as directed every 72 hours., Disp: , Rfl:   •  vitamin D, Ergocalciferol, (DRISDOL) 75609 UNITS Cap capsule, Take 50,000 Units by mouth every Friday., Disp: , Rfl:   •  cyanocobalamin (HM VITAMIN B12) 500 MCG tablet, Take 1,000 mcg by mouth 2 times a day., Disp: , Rfl:   •  sodium bicarbonate 325 MG Tab, Take 2 Tabs by mouth 3 times a day., Disp: , Rfl:   •  fluconazole (DIFLUCAN) 150 MG tablet, , Disp: , Rfl:   •  tramadol (ULTRAM) 50 MG Tab, , Disp: , Rfl:   •  nitroGLYCERIN (NITROSTAT) 0.3 MG SL tablet, PLACE 1 TABLET UNDER TONGUE IF NEEDED FOR CHEST PAIN EVERY 5 MINUTES FOR 3 DOSES AS DIRECTED, Disp: , Rfl: 0  •  nitrofurantoin monohydr macro (MACROBID) 100 MG Cap, Take 1 Cap by mouth 2 times a day, with meals. (Patient not taking: Reported on 7/6/2017), Disp: 8 Cap, Rfl: 0  ALLERGIES:   Allergies   Allergen Reactions   • Cefdinir Shortness of Breath and Itching     Tolerated 1/18/17   • Depakote [Divalproex Sodium] Unspecified     Muscle spasms/muscle pain and weakness     • Abilify Unspecified     Headaches/muscle twitching     • Amitriptyline Unspecified     Headaches     • Amoxicillin Rash     Pt states \"I get a rash\".     • Ciprofloxacin Rash     Pt states \"I get a rash\".     • Clindamycin Nausea     Even with food     • Doxycycline Vomiting and Nausea     RXN=unknown   • Ees [Erythromycin] Vomiting and Nausea   • Flagyl [Metronidazole Hcl] Unspecified     \"eye problems\"     • Flomax [Tamsulosin Hydrochloride] Swelling   • Metformin Unspecified     Increased lactic acid      • Sulfa Drugs Hives and Rash     RXN=since childhood   • Tape Rash     Tears skin off   coban with Tegaderm tape ok  RXN=ongoing   • Vancomycin Itching     " Pt becomes flushed in face and chest.   RXN=7/10/16   • Cephalexin [Keflex] Rash     Pt states she gets a rash with this medication   • Erythromycin    • Levofloxacin Unspecified     Leg muscle cramps   • Metronidazole    • Valproic Acid      SURGHX:   Past Surgical History   Procedure Laterality Date   • Neuro dest facet l/s w/ig sngl  4/21/2015     Performed by Reza Tabor at SURGERY [facility]   • Recovery  1/27/2016     Procedure: CATH LAB EP STUDY WITH SINUS NODE MODIFICATION ABHINAV;  Surgeon: [facility];  Location: SURGERY PRE-POST PROC UNIT [facility];  Service:    • Katie by laparoscopy  8/29/2010     Performed by SHAYY JOHNS at SURGERY [facility]   • Lumbar fusion anterior  8/21/2012     Performed by SUSIE SAWANT at SURGERY [facility]   • Other cardiac surgery  1/2016     cardiac ablation   • Tonsillectomy       tonsillectomy   • Bowel stimulator insertion  7/13/2016     Procedure: BOWEL STIMULATOR INSERTION FOR PERMANENT INTERSTIM SACRAL IMPLANT;  Surgeon: Joe Noyola M.D.;  Location: SURGERY [facility];  Service:    • Gastroscopy with balloon dilatation N/A 1/4/2017     Procedure: GASTROSCOPY WITH DILATATION;  Surgeon: Torres Vargas M.D.;  Location: SURGERY [facility];  Service:    • Muscle biopsy Right 1/26/2017     Procedure: MUSCLE BIOPSY - THIGH;  Surgeon: Isidro Vigil M.D.;  Location: SURGERY [facility];  Service:    • Other abdominal surgery       SOCHX:  reports that she has never smoked. She has never used smokeless tobacco. She reports that she does not drink alcohol or use illicit drugs.  FH: family history includes Genitourinary () in her sister; Heart Disease in her maternal grandmother and mother; Hypertension in her maternal grandmother, maternal uncle, and mother; Other in her mother and sister; Sleep Apnea in her mother.       Assessment/Plan:     1. Otalgia of both ears  Continue omnicef as prescribed until completed. Advised not to put  anything in her ears, her canals appear irritated but not infected. Follow up with ENT as needed for ongoing ear pain. The patient demonstrated a good understanding and agreed with the treatment plan.    2. Hemoptysis  - DX-CHEST-2 VIEWS; Future  Impression        No active disease.            report per radiology.  I have reviewed the films and agree with the reading    Patient has extensive allergy list. Advised OTC cough medicine as needed for symptomatic relief. Increased fluids and rest. Call or return to office if symptoms persist or worsen. The patient demonstrated a good understanding and agreed with the treatment plan.

## 2017-07-09 ENCOUNTER — HOSPITAL ENCOUNTER (EMERGENCY)
Facility: MEDICAL CENTER | Age: 27
End: 2017-07-09
Payer: MEDICARE

## 2017-07-09 VITALS
BODY MASS INDEX: 48.28 KG/M2 | WEIGHT: 262.35 LBS | HEART RATE: 79 BPM | HEIGHT: 62 IN | SYSTOLIC BLOOD PRESSURE: 152 MMHG | OXYGEN SATURATION: 97 % | RESPIRATION RATE: 16 BRPM | TEMPERATURE: 97 F | DIASTOLIC BLOOD PRESSURE: 99 MMHG

## 2017-07-09 PROCEDURE — 302449 STATCHG TRIAGE ONLY (STATISTIC)

## 2017-07-09 PROCEDURE — 665999 HH PPS REVENUE DEBIT

## 2017-07-09 PROCEDURE — 665998 HH PPS REVENUE CREDIT

## 2017-07-09 NOTE — ED NOTES
"Chief Complaint   Patient presents with   • Urinary Retention     last urination was 2200, \"painful\"      Pt to triage in wheelchair, appears uncomfortable, states she had suprapubic catheter for ~1 year, was taken out last month. Pt has been seen recently for same. Pt placed back in lobby at this time, told to alert staff to changes in condition.    /99 mmHg  Pulse 79  Temp(Src) 36.1 °C (97 °F)  Resp 16  Ht 1.575 m (5' 2\")  Wt 119 kg (262 lb 5.6 oz)  BMI 47.97 kg/m2  SpO2 97%    "

## 2017-07-10 ENCOUNTER — OFFICE VISIT (OUTPATIENT)
Dept: MEDICAL GROUP | Facility: MEDICAL CENTER | Age: 27
End: 2017-07-10
Payer: MEDICARE

## 2017-07-10 ENCOUNTER — HOME CARE VISIT (OUTPATIENT)
Dept: HOME HEALTH SERVICES | Facility: HOME HEALTHCARE | Age: 27
End: 2017-07-10
Payer: MEDICARE

## 2017-07-10 VITALS
SYSTOLIC BLOOD PRESSURE: 133 MMHG | DIASTOLIC BLOOD PRESSURE: 79 MMHG | TEMPERATURE: 97 F | RESPIRATION RATE: 16 BRPM | HEART RATE: 66 BPM

## 2017-07-10 VITALS
SYSTOLIC BLOOD PRESSURE: 126 MMHG | HEART RATE: 69 BPM | DIASTOLIC BLOOD PRESSURE: 84 MMHG | HEIGHT: 62 IN | RESPIRATION RATE: 16 BRPM | TEMPERATURE: 98.7 F | OXYGEN SATURATION: 96 %

## 2017-07-10 DIAGNOSIS — H10.30 ACUTE CONJUNCTIVITIS, UNSPECIFIED ACUTE CONJUNCTIVITIS TYPE, UNSPECIFIED LATERALITY: ICD-10-CM

## 2017-07-10 PROCEDURE — G0156 HHCP-SVS OF AIDE,EA 15 MIN: HCPCS

## 2017-07-10 PROCEDURE — 99213 OFFICE O/P EST LOW 20 MIN: CPT | Performed by: INTERNAL MEDICINE

## 2017-07-10 PROCEDURE — 665999 HH PPS REVENUE DEBIT

## 2017-07-10 PROCEDURE — 665998 HH PPS REVENUE CREDIT

## 2017-07-10 PROCEDURE — G0151 HHCP-SERV OF PT,EA 15 MIN: HCPCS

## 2017-07-10 RX ORDER — CIPROFLOXACIN HYDROCHLORIDE 3.5 MG/ML
1 SOLUTION/ DROPS TOPICAL
Qty: 1 BOTTLE | Refills: 0 | Status: SHIPPED | OUTPATIENT
Start: 2017-07-10 | End: 2017-07-10

## 2017-07-10 ASSESSMENT — BALANCE ASSESSMENTS
STANDING BALANCE: 1
SITTING BALANCE: 1
EYES CLOSED AT MAXIMUM POSITION NUDGED: 0
ATTEMPTS TO ARISE: 2
SITTING DOWN: 1
TURNING 360 DEGREES STEADINESS: 1
BALANCE SCORE: 7
NUDGED: 0
TURNING 360 DEGREES STEPS: 0
SURVEY COMPLETE: TRUE
IMMEDIATE STANDING BALANCE FIRST 5 SECONDS: 0
ARISES: 1

## 2017-07-10 ASSESSMENT — GAIT ASSESSMENTS
INITIATION OF GAIT IMMEDIATELY AFTER GO: 1
STEP CONTINUITY: 1
TIME: 0
RIGHT STEP PASS: 1
TRUNK: 0
PATH: 0
LEFT STEP PASS: 1
RIGHT STEP CLEAR: 1
GAIT SCORE: 6
SURVEY COMPLETE: FALSE
LEFT STEP CLEAR: 1
BALANCE AND GAIT SCORE: 13

## 2017-07-10 ASSESSMENT — ACTIVITIES OF DAILY LIVING (ADL): TRANSPORTATION COMMENTS: FALL RISK

## 2017-07-10 ASSESSMENT — ENCOUNTER SYMPTOMS: DEBILITATING PAIN: 1

## 2017-07-10 NOTE — PROGRESS NOTES
CC: Recurrent eye irritation    HPI:   Kristin presents today with the following.    1. Acute conjunctivitis, unspecified acute conjunctivitis type, unspecified laterality  Presents after being treated for a course of conjunctivitis proximate 2 weeks ago. She began having matting in both eyes sticking shut in the morning. She is being treated for an ear infection currently on third-generation cephalosporin. Her ears are still problematic 2 days ago based on that visit. Denies any other new symptomology.      Patient Active Problem List    Diagnosis Date Noted   • Paraparesis of both lower limbs (CMS-HCC) 01/24/2017     Priority: High   • Sinus tachycardia 10/31/2013     Priority: High   • Chronic inflammatory arthritis 05/23/2016     Priority: Medium   • Depression 10/28/2016     Priority: Low   • Schizophrenia (CMS-HCC) 10/27/2016     Priority: Low   • Psychosis, schizophrenia, simple ([facility]) 09/29/2016     Priority: Low     Class: Chronic   • TONYA on CPAP 03/07/2016     Priority: Low   • Acquired hypothyroidism 11/23/2015     Priority: Low   • PCOS (polycystic ovarian syndrome) 11/23/2015     Priority: Low   • Progressive focal motor weakness 06/28/2015     Priority: Low   • Fatty liver disease, nonalcoholic 01/19/2015     Priority: Low   • Anxiety 12/16/2014     Priority: Low   • Knee pain, right 02/13/2014     Priority: Low   • Neurogenic bladder 04/02/2011     Priority: Low   • Chronic UTI 09/18/2010     Priority: Low   • Multiple personality disorder 09/18/2010     Priority: Low   • IGT (impaired glucose tolerance) 07/06/2017   • Rash 06/09/2017   • Hashimoto's encephalopathy 05/17/2017   • UTI (urinary tract infection) 05/13/2017   • Weakness 05/13/2017   • Enterococcus faecalis infection 05/13/2017   • Fall at home 05/13/2017   • Dysuria 05/09/2017   • On home oxygen therapy 04/15/2017   • Chest pain 03/30/2017   • Weakness of right upper extremity 02/23/2017   • Leg weakness, bilateral 02/22/2017   • Chronic  suprapubic catheter (CMS-HCC) 02/16/2017   • Weakness 01/22/2017   • Leg weakness 01/04/2017   • Hypovitaminosis D 11/29/2016   • HTN (hypertension) 11/01/2016   • Chronic pain syndrome 10/27/2016   • Bowel and bladder incontinence 10/27/2016   • Galactorrhea 07/22/2016   • Elevated sedimentation rate 06/27/2016   • Weakness of both lower extremities 06/22/2016   • Vitamin D deficiency 05/21/2016   • Morbidly obese (CMS-HCC) 03/07/2016   • Conversion disorder 03/07/2016   • Scoliosis 03/07/2016   • GERD (gastroesophageal reflux disease) 03/07/2016   • Peripheral neuropathy (CMS-HCC) 03/06/2016   • H/O prior ablation treatment 02/10/2016       Current Outpatient Prescriptions   Medication Sig Dispense Refill   • ciprofloxacin (CILOXIN) 0.3 % Solution Place 1 Drop in both eyes every 2 hours. 1 Bottle 0   • BD PEN NEEDLE MALLY U/F 32G X 4 MM Misc      • tobramycin (TOBREX) 0.3 % Solution ophthalmic solution      • tramadol (ULTRAM) 50 MG Tab      • non-formulary med Inhale 2 L/min by mouth Continuous. OXYGEN  Indications: TONYA     • cefdinir (OMNICEF) 300 MG Cap Take 300 mg by mouth 2 times a day. Indications: Acute Infection of the Middle Ear     • ziprasidone (GEODON) 80 MG Cap TAKE 2 CAPSULES BY MOUTH NIGHTLY AT BEDTIME 60 Cap 5   • GRALISE 600 MG Tab Take 600 mg by mouth.     • nitroGLYCERIN (NITROSTAT) 0.3 MG SL tablet PLACE 1 TABLET UNDER TONGUE IF NEEDED FOR CHEST PAIN EVERY 5 MINUTES FOR 3 DOSES AS DIRECTED  0   • sitagliptin (JANUVIA) 100 MG Tab Take 1 Tab by mouth every day. 30 Tab 6   • Mirabegron ER (MYRBETRIQ) 25 MG TABLET SR 24 HR Take 1 Tab by mouth every day.     • lactobacillus granules (LACTINEX/FLORANEX) Pack Take 1 Packet by mouth 3 times a day, with meals.     • oxycodone-acetaminophen (PERCOCET-10)  MG Tab Take 1-2 Tabs by mouth every 6 hours as needed for Severe Pain. Scheduled. (Patient taking differently: Take 2 Tabs by mouth 2 times a day. Scheduled.) 1 Tab 0   • levothyroxine (SYNTHROID)  75 MCG Tab Take 75 mcg by mouth Every morning on an empty stomach.     • albuterol 108 (90 BASE) MCG/ACT Aero Soln inhalation aerosol Inhale 2 Puffs by mouth every 6 hours as needed for Shortness of Breath.     • lactulose 10 GM/15ML Solution Take 10 g by mouth 2 times a day as needed.     • furosemide (LASIX) 40 MG Tab Take 40 mg by mouth every day.     • gabapentin (NEURONTIN) 300 MG Cap Take 600 mg by mouth 3 times a day.     • Cranberry 400 MG Tab Take 2 Tabs by mouth every day.     • promethazine (PHENERGAN) 25 MG Tab Take 1 Tab by mouth every 6 hours as needed for Nausea/Vomiting. 30 Tab 6   • aspirin EC (ECOTRIN) 81 MG Tablet Delayed Response Take 1 Tab by mouth every day. 30 Tab 6   • baclofen (LIORESAL) 10 MG Tab Take 1 Tab by mouth 3 times a day. 90 Tab 6   • ivabradine (CORLANOR) 5 MG Tab tablet Take 1 Tab by mouth 2 times a day, with meals. 60 Tab 6   • fluoxetine (PROZAC) 10 MG Cap TAKE ONE CAPSULE BY MOUTH ONCE A DAY 30 Cap 2   • Melatonin 5 MG Tab Take 10 mg by mouth every bedtime.     • fentanyl (DURAGESIC) 25 MCG/HR PATCH 72 HR Apply 1 Patch to skin as directed every 72 hours.     • vitamin D, Ergocalciferol, (DRISDOL) 20834 UNITS Cap capsule Take 50,000 Units by mouth every Friday.     • cyanocobalamin (HM VITAMIN B12) 500 MCG tablet Take 1,000 mcg by mouth 2 times a day.     • sodium bicarbonate 325 MG Tab Take 2 Tabs by mouth 3 times a day.     • nitrofurantoin monohydr macro (MACROBID) 100 MG Cap Take 1 Cap by mouth 2 times a day, with meals. (Patient not taking: Reported on 7/6/2017) 8 Cap 0     No current facility-administered medications for this visit.         Allergies as of 07/10/2017 - Joe as Reviewed 07/10/2017   Allergen Reaction Noted   • Cefdinir Shortness of Breath and Itching 03/01/2016   • Depakote [divalproex sodium] Unspecified 06/14/2010   • Abilify Unspecified 01/17/2013   • Amitriptyline Unspecified 10/31/2013   • Amoxicillin Rash 09/18/2010   • Ciprofloxacin Rash 12/17/2009  "  • Clindamycin Nausea 02/02/2011   • Doxycycline Vomiting and Nausea 08/15/2012   • Ees [erythromycin] Vomiting and Nausea 08/28/2010   • Flagyl [metronidazole hcl] Unspecified 03/31/2011   • Flomax [tamsulosin hydrochloride] Swelling 09/24/2009   • Metformin Unspecified 07/23/2013   • Sulfa drugs Hives and Rash 09/18/2010   • Tape Rash 08/15/2012   • Vancomycin Itching 07/10/2016   • Cephalexin [keflex] Rash 01/01/2017   • Erythromycin  03/30/2017   • Levofloxacin Unspecified 10/27/2016   • Metronidazole  03/30/2017   • Valproic acid  03/30/2017        ROS: As per HPI.    /84 mmHg  Pulse 69  Temp(Src) 37.1 °C (98.7 °F)  Resp 16  Ht 1.575 m (5' 2.01\")  SpO2 96%    Physical Exam:  Gen:         Alert and oriented, No apparent distress.  Neck:        No Lymphadenopathy or Bruits.  Lungs:     Clear to auscultation bilaterally  CV:          Regular rate and rhythm. No murmurs, rubs or gallops.               Ext:          No clubbing, cyanosis, edema.      Assessment and Plan.   26 y.o. female with the following issues.    1. Acute conjunctivitis, unspecified acute conjunctivitis type, unspecified laterality  Some slight matting question whether this is allergic versus infectious. Have instructed her throughout her old eye drops and given new eyedrops recommended treatment for allergies.        "

## 2017-07-10 NOTE — MR AVS SNAPSHOT
"        Kristin Armstrong Tacos   7/10/2017 9:20 AM   Office Visit   MRN: [id number]    Department:  [address] [place] [address]   Dept Phone:  [phone]    Description:  Female : 1990   Provider:  Torres Brody M.D.           Reason for Visit     Conjunctivitis getting worse      Allergies as of 7/10/2017     Allergen Noted Reactions    Cefdinir 2016   Shortness of Breath, Itching    Tolerated 17    Depakote [Divalproex Sodium] 2010   Unspecified    Muscle spasms/muscle pain and weakness      Abilify 2013   Unspecified    Headaches/muscle twitching      Amitriptyline 10/31/2013   Unspecified    Headaches      Amoxicillin 2010   Rash    Pt states \"I get a rash\".      Ciprofloxacin 2009   Rash    Pt states \"I get a rash\".      Clindamycin 2011   Nausea    Even with food      Doxycycline 08/15/2012   Vomiting, Nausea    RXN=unknown    Ees [Erythromycin] 2010   Vomiting, Nausea    Flagyl [Metronidazole Hcl] 2011   Unspecified    \"eye problems\"      Flomax [Tamsulosin Hydrochloride] 2009   Swelling    Metformin 2013   Unspecified    Increased lactic acid       Sulfa Drugs 2010   Hives, Rash    RXN=since childhood    Tape 08/15/2012   Rash    Tears skin off   coban with Tegaderm tape ok  RXN=ongoing    Vancomycin 07/10/2016   Itching    Pt becomes flushed in face and chest.   RXN=7/10/16    Cephalexin [Keflex] 2017   Rash    Pt states she gets a rash with this medication    Erythromycin 2017       Levofloxacin 10/27/2016   Unspecified    Leg muscle cramps    Metronidazole 2017       Valproic Acid 2017         You were diagnosed with     Acute conjunctivitis, unspecified acute conjunctivitis type, unspecified laterality   [[id number]]         Vital Signs     Blood Pressure Pulse Temperature Respirations Height Oxygen Saturation    126/84 mmHg 69 37.1 °C (98.7 °F) 16 1.575 m (5' 2.01\") 96%    Smoking Status                   Never " Smoker            Basic Information     Date Of Birth Sex Race Ethnicity Preferred Language    1990 Female White Non- English      Your appointments     Jul 10, 2017 11:00 AM   PT-HH-INITIAL EVALUATION with Abhishek Baker P.T.   [facility] (--)    [address]  [place] [address]   [phone]            Jul 10, 2017 To Be Determined   AIDE-HH-HOME VISIT with Karla Marquez C.N.A.   [facility] (--)    [address]  [place] [address]   [phone]            Jul 11, 2017 To Be Determined   SN-HH-HOME VISIT with Sinai Hooker R.N.   [facility] (--)    [address]  [place] [address]   [phone]            Jul 12, 2017 To Be Determined   AIDE-HH-HOME VISIT with [facility] (--)    [address]  [place] [address]   [phone]            Jul 14, 2017 To Be Determined   AIDE-HH-HOME VISIT with Karla Marquez C.N.A.   [facility] (--)    [address]  [place] [address]   [phone]            Jul 14, 2017 10:00 AM   Follow Up Med Management with Ronny Burnette M.D.   [facility] [address]  [place] [address]   [phone]            Jul 14, 2017 To Be Determined   SN-HH-HOME VISIT with [facility] (--)    [address]  [place] [address]   [phone]            Jul 17, 2017 To Be Determined   AIDE-HH-HOME VISIT with Karla Marquez C.N.A.   [facility] (--)    [address]  [place] [address]   [phone]            Jul 17, 2017 10:00 AM   Individual Therapy with Blanca Brantley L.C.S.W.   [facility]    [address] [facility] [address]  [place] [id number] [phone]            Jul 18, 2017 To Be Determined   SN-HH-HOME VISIT with [facility] (--)    [address]  [place] [address]   [phone]            Jul 19, 2017 To Be Determined   AIDE-HH-HOME VISIT with  [facility] (--)    [address]  [place] [address]   [phone]            Jul 21, 2017 To Be Determined   SN-HH-HOME VISIT with [facility] TEAM [facility] (--)    [address]  [place] [address]   [phone]            Jul 21, 2017 To Be Determined   AIDE-HH-HOME VISIT with [facility] (--)    [address]  [place] [address]   [phone]            Jul 24, 2017 To Be Determined   AIDE-HH-HOME VISIT with [facility] TEAM [facility] (--)    [address]  [place] [address]   [phone]            Jul 25, 2017 To Be Determined   SN-HH-HOME VISIT with Joe Ramirez R.N.   [facility] (--)    [address]  [place] [address]   [phone]            Jul 26, 2017 To Be Determined   AIDE-HH-HOME VISIT with Karla Marquez C.N.A.   [facility] (--)    [address]  [place] [address]   [phone]            Jul 28, 2017 To Be Determined   SN-HH-HOME VISIT with Joe Ramirez R.N.   [facility] (--)    [address]  [place] [address]   [phone]            Jul 28, 2017 To Be Determined   AIDE-HH-HOME VISIT with Karla Marquez C.N.A.   [facility] (--)    [address]  [place] [address]   [phone]            Jul 31, 2017 To Be Determined   AIDE-HH-HOME VISIT with [facility] (--)    [address]  [place] [address]   [phone]            Aug 01, 2017 To Be Determined   SN-HH-HOME VISIT with Sinai Hooker R.N.   [facility] (--)    [address] [facility].  [facility] [address]   [phone]            Aug 02, 2017 To Be Determined   AIDE-HH-HOME VISIT with Karla Marquez C.N.A.   [facility] (--)    [facility]  [facility] [address]   [phone]            Aug 04, 2017 To Be Determined   AIDE-HH-HOME VISIT with Karla Marquez C.N.A.   [facility] (--)    [address] [place] [address] [address] [facility].  [facility] [address]   [phone]             Aug 07, 2017 To Be Determined   AIDE-HH-HOME VISIT with Karla Marquez C.N.A.   [facility] (--)    [address]  [place] [address]   [phone]            Aug 08, 2017 To Be Determined   SN-HH-HOME VISIT with Joe Ramirez R.N.   [facility] (--)    [address]  [place] [address]   [phone]            Aug 09, 2017 To Be Determined   AIDE-HH-HOME VISIT with Karla Marquez C.N.A.   [facility] (--)    [address]  [place] [address]   [phone]            Aug 11, 2017 10:00 AM   Follow Up Visit with Sandoval Fox M.D.   [facility] (--)    [address]  [place] [address]   [phone]           You will be receiving a confirmation call a few days before your appointment from our automated call confirmation system.            Aug 11, 2017 To Be Determined   AIDE-HH-HOME VISIT with Karla Marquez C.N.A.   [facility] (--)    [address]  [place] [address]   [phone]            Aug 14, 2017 To Be Determined   AIDE-HH-HOME VISIT with Karla Marquez C.N.A.   [facility] (--)    [address]  [place] [address]   [phone]            Aug 15, 2017 To Be Determined   SN-HH-HOME VISIT with Joe Ramirez R.N.   [facility] (--)    [address]  [place] [address]   [phone]            Aug 16, 2017 To Be Determined   AIDE-HH-HOME VISIT with Karla Marquez C.N.A.   [facility] (--)    [address]  [place] [address]   [phone]            Aug 18, 2017 To Be Determined   AIDE-HH-HOME VISIT with Karla Marquez C.N.A.   [facility] (--)    [facility]  [facility] [address]   [phone]            Aug 21, 2017 To Be Determined   AIDE-HH-HOME VISIT with Karla Marquez C.N.A.   [facility] (--)    [facility]  [facility] [address]   [phone]            Aug 22, 2017 To Be Determined   SN-HH-HOME VISIT with Joe Ramirez R.N.   [facility] (--)    [address] [facility].  [place] [address]   [phone]            Aug 23, 2017 To Be Determined   AIDE-HH-HOME VISIT with Karla Marquez C.N.A.   [facility] (--)    [address]  [place] [address]   [phone]            Aug 25, 2017 To Be Determined   AIDE-HH-HOME VISIT with Karla Marquez C.N.A.   [facility] (--)    [address] [facility].  [place] [address]   [phone]            Aug 29, 2017 To Be Determined   SN-HH-HOME VISIT with Sinai Hooker R.N.   [facility] (--)    [address] [facility]  [place] [address]   [phone]            Oct 06, 2017  7:20 AM   Established Patient with Torres Brody M.D.   [facility] [address]    [address]  [address]  [facility] [address]   [phone]           You will be receiving a confirmation call a few days before your appointment from our automated call confirmation system.              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 Low  9/18/2010 - Present    Neurogenic bladder N31.9 Low  4/2/2011 - Present    Sinus tachycardia (Chronic) R00.0 High  10/31/2013 - Present    Knee pain, right M25.561 Low  2/13/2014 - Present    Anxiety F41.9 Low  12/16/2014 - Present    Fatty liver disease, nonalcoholic K76.0 Low  1/19/2015 - Present    Progressive focal motor weakness R53.1 Low  6/28/2015 - Present    Acquired hypothyroidism E03.9 Low  11/23/2015 - Present    PCOS (polycystic ovarian syndrome) E28.2 Low  11/23/2015 - Present    H/O prior ablation treatment Z98.890   2/10/2016 - Present    Peripheral neuropathy (CMS-HCC) (Chronic) G62.9   3/6/2016 - Present    TONYA on CPAP G47.33, Z99.89 Low  3/7/2016 - Present    Morbidly obese (CMS-HCC) E66.01   3/7/2016 - Present    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   3/7/2016 - Present    Scoliosis M41.9   3/7/2016 - Present    GERD (gastroesophageal  reflux disease) (Chronic) K21.9   3/7/2016 - Present    Vitamin D deficiency E55.9   5/21/2016 - Present    Chronic inflammatory arthritis (Chronic) M19.90 Medium  5/23/2016 - Present    Weakness of both lower extremities R29.898   6/22/2016 - Present    Elevated sedimentation rate R70.0   6/27/2016 - Present    Galactorrhea O92.6   7/22/2016 - Present    Psychosis, schizophrenia, simple (HCC) (Chronic) F20.89 Low Chronic 9/29/2016 - Present    Schizophrenia (CMS-HCC) F20.9 Low  10/27/2016 - Present    Chronic pain syndrome (Chronic) G89.4   10/27/2016 - Present    Bowel and bladder incontinence R32, R15.9   10/27/2016 - Present    Depression F32.9 Low  10/28/2016 - Present    HTN (hypertension) (Chronic) I10   11/1/2016 - Present    Hypovitaminosis D E55.9   11/29/2016 - Present    Leg weakness R29.898   1/4/2017 - Present    Weakness R53.1   1/22/2017 - Present    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 High  1/24/2017 - Present    Chronic suprapubic catheter (CMS-HCC) (Chronic) Z93.59   2/16/2017 - Present    Leg weakness, bilateral R29.898   2/22/2017 - Present    Weakness of right upper extremity R29.898   2/23/2017 - Present    Chest pain R07.9   3/30/2017 - Present    On home oxygen therapy (Chronic) Z99.81   4/15/2017 - Present    Dysuria R30.0   5/9/2017 - Present    UTI (urinary tract infection) N39.0   5/13/2017 - Present    Weakness R53.1   5/13/2017 - Present    Enterococcus faecalis infection B95.2   5/13/2017 - Present    Fall at home W19.XXXA, Y92.099   5/13/2017 - Present    Hashimoto's encephalopathy E06.3, G93.49   5/17/2017 - Present    Rash R21   6/9/2017 - Present    IGT (impaired glucose tolerance) R73.02   7/6/2017 - Present      Health Maintenance        Date Due Completion Dates    IMM HEP A VACCINE (1 of 2 - Standard Series) 10/13/1990 ---    IMM VARICELLA (CHICKENPOX) VACCINE (1 of 2 - 2 Dose Adolescent Series) 10/13/2002 ---    IMM INFLUENZA (1) 9/1/2017 10/5/2016, 9/5/2013,  "12/7/2011, 11/7/2011    A1C SCREENING 1/6/2018 7/6/2017, 6/28/2017, 4/6/2017, 12/7/2016, 10/14/2016, 3/9/2016, 9/5/2014    URINE ACR / MICROALBUMIN 5/5/2018 5/5/2017, 3/7/2017, 12/7/2016, 10/14/2016, 7/28/2016    RETINAL SCREENING 5/23/2018 5/23/2017    DIABETES MONOFILAMENT / LE EXAM 5/23/2018 5/23/2017    FASTING LIPID PROFILE 7/6/2018 7/6/2017, 3/7/2017, 2/24/2017, 12/7/2016, 10/28/2016, 10/14/2016, 3/21/2014    SERUM CREATININE 7/6/2018 7/6/2017, 6/21/2017, 5/18/2017, 5/17/2017, 5/14/2017, 5/13/2017, 5/5/2017, 4/14/2017, 4/13/2017, 4/12/2017, 4/5/2017, 3/27/2017, 3/18/2017, 3/17/2017, 3/16/2017, 3/11/2017, 3/10/2017, 3/9/2017, 3/7/2017, 2/25/2017, 2/24/2017, 2/23/2017, 2/22/2017, 1/30/2017, 1/27/2017, 1/25/2017, 1/22/2017, 1/22/2017, 1/18/2017, 1/18/2017, 12/7/2016, 12/6/2016, 11/4/2016, 11/3/2016, 11/2/2016, 11/1/2016, 10/28/2016, 10/27/2016, 10/14/2016, 10/4/2016, 10/3/2016, 9/29/2016, 8/16/2016, 7/28/2016, 7/28/2016, 7/10/2016, 6/25/2016, 6/23/2016, 6/22/2016, 6/7/2016, 5/18/2016, 4/19/2016, 4/3/2016, 3/30/2016, 3/29/2016, 3/28/2016, 3/14/2016, 3/10/2016, 3/9/2016, 3/7/2016, 3/6/2016, 2/15/2016, 2/12/2016, 1/29/2016, 1/28/2016, 1/26/2016, 11/28/2015, 11/27/2015, 11/23/2015, 11/22/2015, 7/9/2015, 7/8/2015, 6/27/2015, 4/14/2015, 3/23/2015, 2/18/2015, 10/27/2014, 9/5/2014, 3/21/2014, 12/24/2013, 1/12/2013, 8/24/2012, 8/23/2012, 8/22/2012, 8/15/2012, 4/21/2012, 4/20/2012, 4/19/2012, 9/17/2011, 4/3/2011, 4/2/2011, 3/31/2011, 9/20/2010, 9/19/2010, 9/18/2010, 8/28/2010, 7/20/2010, 1/30/2007    PAP SMEAR 7/22/2019 7/22/2016 (Postponed), 2/19/2013 (N/S)    Override on 7/22/2016: Postponed (per pt was told could \"skip\" 2016)    Override on 2/19/2013: (N/S) ([place])    IMM DTaP/Tdap/Td Vaccine (7 - Td) 8/6/2022 8/6/2012, 9/18/2010, 3/3/1994, 5/17/1991, 5/10/1990, 3/23/1990, 1990    COLONOSCOPY 9/25/2023 9/25/2013            Current Immunizations     Dtap Vaccine 3/3/1994, 5/17/1991, 5/10/1990, 3/23/1990, 1990 "    HIB Vaccine(PEDVAX) 1/11/1991    HPV Quadrivalent Vaccine (GARDASIL) 10/27/2011, 5/27/2011, 3/1/2011    Hepatitis B Vaccine Non-Recombivax (Ped/Adol) 1/28/2004, 10/28/2003, 10/29/1999    Influenza TIV (IM) 9/5/2013, 12/7/2011, 11/7/2011    Influenza Vaccine Adult HD 10/5/2016    MMR Vaccine 3/3/1994, 1/11/1991    OPV - Historical Data 3/3/1994, 5/17/1991, 5/10/1990, 3/23/1990, 1990    Pneumococcal Vaccine (PCV7) Historical Data 11/8/2015    Pneumococcal polysaccharide vaccine (PPSV-23) 1/23/2017  5:35 PM, 1/14/2017    TD Vaccine 9/18/2010  4:45 PM    Tdap Vaccine 8/6/2012      Below and/or attached are the medications your provider expects you to take. Review all of your home medications and newly ordered medications with your provider and/or pharmacist. Follow medication instructions as directed by your provider and/or pharmacist. Please keep your medication list with you and share with your provider. Update the information when medications are discontinued, doses are changed, or new medications (including over-the-counter products) are added; and carry medication information at all times in the event of emergency situations     Allergies:  CEFDINIR - Shortness of Breath,Itching     DEPAKOTE - Unspecified     ABILIFY - Unspecified     AMITRIPTYLINE - Unspecified     AMOXICILLIN - Rash     CIPROFLOXACIN - Rash     CLINDAMYCIN - Nausea     DOXYCYCLINE - Vomiting,Nausea     EES - Vomiting,Nausea     FLAGYL - Unspecified     FLOMAX - Swelling     METFORMIN - Unspecified     SULFA DRUGS - Hives,Rash     TAPE - Rash     VANCOMYCIN - Itching     CEPHALEXIN - Rash     ERYTHROMYCIN - (reactions not documented)     LEVOFLOXACIN - Unspecified     METRONIDAZOLE - (reactions not documented)     VALPROIC ACID - (reactions not documented)               Medications  Valid as of: July 10, 2017 -  9:29 AM    Generic Name Brand Name Tablet Size Instructions for use    Albuterol Sulfate (Aero Soln) albuterol 108 (90 BASE)  MCG/ACT Inhale 2 Puffs by mouth every 6 hours as needed for Shortness of Breath.        Aspirin (Tablet Delayed Response) ECOTRIN 81 MG Take 1 Tab by mouth every day.        Baclofen (Tab) LIORESAL 10 MG Take 1 Tab by mouth 3 times a day.        Cefdinir (Cap) OMNICEF 300 MG Take 300 mg by mouth 2 times a day. Indications: Acute Infection of the Middle Ear        Ciprofloxacin HCl (Solution) CILOXIN 0.3 % Place 1 Drop in both eyes every 2 hours.        Cranberry (Tab) Cranberry 400 MG Take 2 Tabs by mouth every day.        Cyanocobalamin (Tab) vitamin b12 500 MCG Take 1,000 mcg by mouth 2 times a day.        Ergocalciferol (Cap) DRISDOL 37257 UNITS Take 50,000 Units by mouth every Friday.        FentaNYL (PATCH 72 HR) DURAGESIC 25 MCG/HR Apply 1 Patch to skin as directed every 72 hours.        FLUoxetine HCl (Cap) PROZAC 10 MG TAKE ONE CAPSULE BY MOUTH ONCE A DAY        Furosemide (Tab) LASIX 40 MG Take 40 mg by mouth every day.        Gabapentin (Cap) NEURONTIN 300 MG Take 600 mg by mouth 3 times a day.        Gabapentin (Once-Daily) (Tab) GRALISE 600 MG Take 600 mg by mouth.        Insulin Pen Needle (Misc) BD PEN NEEDLE MALLY U/F 32G X 4 MM         Ivabradine HCl (Tab) CORLANOR 5 MG Take 1 Tab by mouth 2 times a day, with meals.        Lactobacillus (Pack) LACTINEX/FLORANEX  Take 1 Packet by mouth 3 times a day, with meals.        Lactulose (Solution) lactulose 10 GM/15ML Take 10 g by mouth 2 times a day as needed.        Levothyroxine Sodium (Tab) SYNTHROID 75 MCG Take 75 mcg by mouth Every morning on an empty stomach.        Melatonin (Tab) Melatonin 5 MG Take 10 mg by mouth every bedtime.        Mirabegron (TABLET SR 24 HR) Mirabegron ER 25 MG Take 1 Tab by mouth every day.        Nitrofurantoin Monohyd Macro (Cap) MACROBID 100 MG Take 1 Cap by mouth 2 times a day, with meals.        Nitroglycerin (SL Tab) NITROSTAT 0.3 MG PLACE 1 TABLET UNDER TONGUE IF NEEDED FOR CHEST PAIN EVERY 5 MINUTES FOR 3 DOSES AS  DIRECTED        non-formulary med non-formulary med  Inhale 2 L/min by mouth Continuous. OXYGEN  Indications: TONYA        Oxycodone-Acetaminophen (Tab) PERCOCET-10  MG Take 1-2 Tabs by mouth every 6 hours as needed for Severe Pain. Scheduled.        Promethazine HCl (Tab) PHENERGAN 25 MG Take 1 Tab by mouth every 6 hours as needed for Nausea/Vomiting.        SITagliptin Phosphate (Tab) JANUVIA 100 MG Take 1 Tab by mouth every day.        Sodium Bicarbonate (Tab) sodium bicarbonate 325 MG Take 2 Tabs by mouth 3 times a day.        Tobramycin (Solution) TOBREX 0.3 %         TraMADol HCl (Tab) ULTRAM 50 MG         Ziprasidone HCl (Cap) GEODON 80 MG TAKE 2 CAPSULES BY MOUTH NIGHTLY AT BEDTIME        .                 Medicines prescribed today were sent to:     [id number] - [place], [place] - [address] [place] [address]    Phone: [phone] Fax: [phone]    Open 24 Hours?: No      Medication refill instructions:       If your prescription bottle indicates you have medication refills left, it is not necessary to call your provider’s office. Please contact your pharmacy and they will refill your medication.    If your prescription bottle indicates you do not have any refills left, you may request refills at any time through one of the following ways: The online [employer] system (except Urgent Care), by calling your provider’s office, or by asking your pharmacy to contact your provider’s office with a refill request. Medication refills are processed only during regular business hours and may not be available until the next business day. Your provider may request additional information or to have a follow-up visit with you prior to refilling your medication.   *Please Note: Medication refills are assigned a new Rx number when refilled electronically. Your pharmacy may indicate that no refills were authorized even though a new prescription for the same medication is available at the  pharmacy. Please request the medicine by name with the pharmacy before contacting your provider for a refill.        Other Notes About Your Plan     DME:  [facility] / ph [phone] / fax [phone]              MyChart Access Code: Activation code not generated  Current MyChart Status: Active

## 2017-07-11 ENCOUNTER — HOME CARE VISIT (OUTPATIENT)
Dept: HOME HEALTH SERVICES | Facility: HOME HEALTHCARE | Age: 27
End: 2017-07-11
Payer: MEDICARE

## 2017-07-11 VITALS
BODY MASS INDEX: 47.66 KG/M2 | HEIGHT: 62 IN | WEIGHT: 259 LBS | DIASTOLIC BLOOD PRESSURE: 70 MMHG | HEART RATE: 61 BPM | RESPIRATION RATE: 16 BRPM | SYSTOLIC BLOOD PRESSURE: 140 MMHG | TEMPERATURE: 97.1 F

## 2017-07-11 VITALS
DIASTOLIC BLOOD PRESSURE: 65 MMHG | SYSTOLIC BLOOD PRESSURE: 125 MMHG | TEMPERATURE: 98.3 F | RESPIRATION RATE: 20 BRPM | HEART RATE: 70 BPM

## 2017-07-11 PROCEDURE — 665998 HH PPS REVENUE CREDIT

## 2017-07-11 PROCEDURE — G0299 HHS/HOSPICE OF RN EA 15 MIN: HCPCS

## 2017-07-11 PROCEDURE — 665999 HH PPS REVENUE DEBIT

## 2017-07-11 ASSESSMENT — ENCOUNTER SYMPTOMS
NAUSEA: DENIES
RESPIRATORY SYMPTOMS COMMENTS: NO CONCERNS AT THIS TIME
AGORAPHOBIA: 1
DEPRESSED MOOD: 1
DEBILITATING PAIN: 1
THOUGHT CONTENT - SUSPICIOUS: 1
VOMITING: DENIES

## 2017-07-12 ENCOUNTER — HOME CARE VISIT (OUTPATIENT)
Dept: HOME HEALTH SERVICES | Facility: HOME HEALTHCARE | Age: 27
End: 2017-07-12
Payer: MEDICARE

## 2017-07-12 VITALS
SYSTOLIC BLOOD PRESSURE: 124 MMHG | TEMPERATURE: 97.7 F | RESPIRATION RATE: 18 BRPM | DIASTOLIC BLOOD PRESSURE: 71 MMHG | HEART RATE: 82 BPM

## 2017-07-12 VITALS
TEMPERATURE: 97.9 F | RESPIRATION RATE: 18 BRPM | SYSTOLIC BLOOD PRESSURE: 133 MMHG | DIASTOLIC BLOOD PRESSURE: 70 MMHG | HEART RATE: 71 BPM

## 2017-07-12 PROCEDURE — 665998 HH PPS REVENUE CREDIT

## 2017-07-12 PROCEDURE — G0156 HHCP-SVS OF AIDE,EA 15 MIN: HCPCS

## 2017-07-12 PROCEDURE — 665999 HH PPS REVENUE DEBIT

## 2017-07-12 PROCEDURE — G0151 HHCP-SERV OF PT,EA 15 MIN: HCPCS

## 2017-07-12 ASSESSMENT — ENCOUNTER SYMPTOMS: THOUGHT CONTENT - SUSPICIOUS: 1

## 2017-07-13 PROCEDURE — 665998 HH PPS REVENUE CREDIT

## 2017-07-13 PROCEDURE — 665999 HH PPS REVENUE DEBIT

## 2017-07-14 ENCOUNTER — HOME CARE VISIT (OUTPATIENT)
Dept: HOME HEALTH SERVICES | Facility: HOME HEALTHCARE | Age: 27
End: 2017-07-14
Payer: MEDICARE

## 2017-07-14 ENCOUNTER — OFFICE VISIT (OUTPATIENT)
Dept: BEHAVIORAL HEALTH | Facility: PHYSICIAN GROUP | Age: 27
End: 2017-07-14
Payer: MEDICARE

## 2017-07-14 VITALS
RESPIRATION RATE: 16 BRPM | DIASTOLIC BLOOD PRESSURE: 86 MMHG | SYSTOLIC BLOOD PRESSURE: 124 MMHG | HEART RATE: 80 BPM | HEIGHT: 62 IN | TEMPERATURE: 98.1 F | BODY MASS INDEX: 47.84 KG/M2 | WEIGHT: 260 LBS

## 2017-07-14 VITALS
DIASTOLIC BLOOD PRESSURE: 60 MMHG | HEART RATE: 69 BPM | RESPIRATION RATE: 18 BRPM | TEMPERATURE: 97.8 F | SYSTOLIC BLOOD PRESSURE: 125 MMHG

## 2017-07-14 DIAGNOSIS — F60.3 BORDERLINE PERSONALITY DISORDER (HCC): ICD-10-CM

## 2017-07-14 DIAGNOSIS — F20.0 PARANOID SCHIZOPHRENIA (HCC): ICD-10-CM

## 2017-07-14 PROCEDURE — 99213 OFFICE O/P EST LOW 20 MIN: CPT | Performed by: PSYCHIATRY & NEUROLOGY

## 2017-07-14 PROCEDURE — 665998 HH PPS REVENUE CREDIT

## 2017-07-14 PROCEDURE — 90833 PSYTX W PT W E/M 30 MIN: CPT | Performed by: PSYCHIATRY & NEUROLOGY

## 2017-07-14 PROCEDURE — G0299 HHS/HOSPICE OF RN EA 15 MIN: HCPCS

## 2017-07-14 PROCEDURE — 665999 HH PPS REVENUE DEBIT

## 2017-07-14 ASSESSMENT — ENCOUNTER SYMPTOMS
RESPIRATORY SYMPTOMS COMMENTS: NO CONCERNS AT THIS TIME
POOR JUDGMENT: 1
THOUGHT CONTENT - SUSPICIOUS: 1
VOMITING: DENIES
NAUSEA: DENIES

## 2017-07-14 NOTE — PROGRESS NOTES
"[facility]  PSYCHIATRIC FOLLOW-UP NOTE    Name: Kristin Balderrama  MRN: [id number]  : 1990  Age: 26 y.o.  Date of assessment: 2017  PCP: Torres Brody M.D.  Persons in attendance: Patient  REASON FOR VISIT/CHIEF COMPLAINT (as stated by Patient):  Kristin Balderrama is a 26 y.o., White female, attending follow-up appointment for   Chief Complaint   Patient presents with   • Medication Management     The patient is seen today for follow up on her psychosis. The patient took extra geodon and she ran out.      .      HISTORY OF PRESENT ILLNESS:    This is a 25 yo female, single, lives with her grand parents, came today using her walker, and nasal oxygen. The patient has no urinary catheter and she was able to get rid of that. The patient stopped steroids and she lost weight. The patient is staying with her grand parents and her grand father had a storke recently.The patient The patient gained weight and now she is using metformin. The patient reported infrequent psychosis and she increased her geodon by her self and she became shot on pills.      PSYCHOSOCIAL CHANGES SINCE PREVIOUS CONTACT:  The patient denied current psychosis.    RESPONSE TO TREATMENT:  The patient is taking geodon 80 mg two at night.  And she has good response to it.    MEDICATION SIDE EFFECTS:  Denied.    REVIEW OF SYSTEMS:        Constitutional positive - obesity, history of incontinence   Eyes negative   Ears/Nose/Mouth/Throat negative   Cardiovascular positive - hypertension   Respiratory positive - obstructive sleep apnea   Gastrointestinal negative   Genitourinary positive - chronic UTI, history of PCOS   Muscular negative   Integumentary negative   Neurological positive - history of frequent falls.   Endocrine positive - hypothyroidism, diabetes.   Hematologic/Lymphatic negative       PSYCHIATRIC EXAMINATION/MENTAL STATUS  /86 mmHg  Pulse 80  Temp(Src) 36.7 °C (98.1 °F)  Resp 16  Ht 1.575 m (5' 2.01\")  " Wt 117.935 kg (260 lb)  BMI 47.54 kg/m2  Participation: Active verbal participation and Attentive  Grooming:Casual  Orientation: Alert and Fully Oriented  Eye contact: Good  Behavior:Calm   Mood: Anxious  Affect: Anxious  Thought process: Logical and Goal-directed  Thought content:  Within normal limits  Speech: Rate within normal limits and Volume within normal limits  Perception:  Within normal limits  Memory:  No gross evidence of memory deficits  Insight: Good  Judgment: Good  Family/couple interaction observations:   Other:    Current risk:    Suicide: Low   Homicide: Low   Self-harm: Low  Relapse: Low  Other:   Crisis Safety Plan reviewed?Yes  If evidence of imminent risk is present, intervention/plan:    Medical Records/Labs/Diagnostic Tests Reviewed: yes.    Medical Records/Labs/Diagnostic Tests Ordered: none.    DIAGNOSTIC IMPRESSION(S):  1. Paranoid schizophrenia (CMS-HCC)    2. Borderline personality disorder           ASSESSMENT AND PLAN:    1. Schizophrenia  2. Borderline personality disorder.  3. R/O conversion disorder.    Continue   Geodon 80 mg two at night.    Follow up in six weeks.    16 minutes were spent in psychotherapy.  (If greater than 16 minutes, add 13822 code)   Topics addressed in psychotherapy include:   Problems with emotional regulation are believed to be the core dysfunction seen in borderline PD.  These patterns of behavior include affective instability, chronic feelings of emptiness, and intense display of anger.  The intense displays of emotion may subsequently result in gaining attention, which may reinforce the persons behavior.   The emotional pain is converted in to physical symptoms   The patient agreed to practice relaxation and breathing exercise to relieve the pain.        Ronny Burnette MD

## 2017-07-15 PROCEDURE — 665999 HH PPS REVENUE DEBIT

## 2017-07-15 PROCEDURE — 665998 HH PPS REVENUE CREDIT

## 2017-07-16 PROCEDURE — 665999 HH PPS REVENUE DEBIT

## 2017-07-16 PROCEDURE — 665998 HH PPS REVENUE CREDIT

## 2017-07-17 ENCOUNTER — OFFICE VISIT (OUTPATIENT)
Dept: BEHAVIORAL HEALTH | Facility: PHYSICIAN GROUP | Age: 27
End: 2017-07-17
Payer: MEDICARE

## 2017-07-17 DIAGNOSIS — F20.9 SCHIZOPHRENIA, UNSPECIFIED TYPE (HCC): ICD-10-CM

## 2017-07-17 PROCEDURE — 665999 HH PPS REVENUE DEBIT

## 2017-07-17 PROCEDURE — 665998 HH PPS REVENUE CREDIT

## 2017-07-17 PROCEDURE — 90834 PSYTX W PT 45 MINUTES: CPT | Performed by: SOCIAL WORKER

## 2017-07-17 NOTE — BH THERAPY
[facility]  Therapy Progress Note    Patient Name: Kristin Balderrama  Patient MRN: [id number]  Today's Date: 7/17/2017     Type of session:Individual psychotherapy  Length of session: 45 minutes  Persons in attendance:Patient    Subjective/New Info: Pt states she has been hospitalized multiple times and also had stays in rehab for ongoing medical issues.  Home environment very stressful, with constant friction between pt and grandmother.  Grandfather, grandmother, and pt all sleep at random times during the day.  Pt complains that she is expected to be quiet when others are resting but is not given the same consideration.  Sees no possibility for resolving conflicts between self and grandmother, declines offer of bringing grandmother to session.  Helpful to vent today.  Going [facility] she [facility].  Pt adds there is a ghost in her grandmother's home; notices objects move by themselves, noises, etc.      Objective/Observations:   Participation: Active verbal participation   Grooming: Casual and Neat   Cognition: Alert and Fully Oriented   Eye contact: Good   Mood: Depressed and Angry   Affect: Constricted and Congruent with content   Thought process: Logical and Goal-directed   Speech: Rate within normal limits and Volume within normal limits   Other:     Diagnoses:   1. Schizophrenia, unspecified type         Current risk:   SUICIDE: Low   Homicide: Low   Self-harm: Low   Relapse: Not applicable   Other:    Safety Plan reviewed? No   If evidence of imminent risk is present, intervention/plan:     Therapeutic Intervention(s): Stressors assessed and Supportive psychotherapy    Treatment Goal(s)/Objective(s) addressed: Supportive psychotherapy     Progress toward Treatment Goals: No change    Plan:  - Continue Individual therapy and Medication management    Blanca Brantley L.C.S.W.  7/17/2017

## 2017-07-18 ENCOUNTER — PATIENT OUTREACH (OUTPATIENT)
Dept: HEALTH INFORMATION MANAGEMENT | Facility: OTHER | Age: 27
End: 2017-07-18

## 2017-07-18 ENCOUNTER — HOME CARE VISIT (OUTPATIENT)
Dept: HOME HEALTH SERVICES | Facility: HOME HEALTHCARE | Age: 27
End: 2017-07-18
Payer: MEDICARE

## 2017-07-18 ENCOUNTER — TELEPHONE (OUTPATIENT)
Dept: MEDICAL GROUP | Facility: MEDICAL CENTER | Age: 27
End: 2017-07-18

## 2017-07-18 ENCOUNTER — HOSPITAL ENCOUNTER (OUTPATIENT)
Facility: MEDICAL CENTER | Age: 27
End: 2017-07-18
Attending: NURSE PRACTITIONER
Payer: MEDICARE

## 2017-07-18 ENCOUNTER — OFFICE VISIT (OUTPATIENT)
Dept: MEDICAL GROUP | Facility: MEDICAL CENTER | Age: 27
End: 2017-07-18
Payer: MEDICARE

## 2017-07-18 VITALS
RESPIRATION RATE: 16 BRPM | SYSTOLIC BLOOD PRESSURE: 126 MMHG | TEMPERATURE: 97.6 F | HEART RATE: 76 BPM | DIASTOLIC BLOOD PRESSURE: 60 MMHG

## 2017-07-18 VITALS
HEIGHT: 62 IN | RESPIRATION RATE: 16 BRPM | WEIGHT: 260 LBS | OXYGEN SATURATION: 93 % | DIASTOLIC BLOOD PRESSURE: 76 MMHG | TEMPERATURE: 98.7 F | SYSTOLIC BLOOD PRESSURE: 122 MMHG | HEART RATE: 85 BPM | BODY MASS INDEX: 47.84 KG/M2

## 2017-07-18 VITALS
TEMPERATURE: 97.2 F | SYSTOLIC BLOOD PRESSURE: 148 MMHG | HEART RATE: 73 BPM | DIASTOLIC BLOOD PRESSURE: 92 MMHG | RESPIRATION RATE: 18 BRPM

## 2017-07-18 VITALS
SYSTOLIC BLOOD PRESSURE: 128 MMHG | RESPIRATION RATE: 18 BRPM | HEART RATE: 76 BPM | TEMPERATURE: 97.6 F | DIASTOLIC BLOOD PRESSURE: 61 MMHG

## 2017-07-18 DIAGNOSIS — R30.0 DYSURIA: ICD-10-CM

## 2017-07-18 DIAGNOSIS — Z23 NEED FOR VACCINATION: ICD-10-CM

## 2017-07-18 LAB
APPEARANCE UR: CLEAR
BILIRUB UR STRIP-MCNC: ABNORMAL MG/DL
COLOR UR AUTO: YELLOW
GLUCOSE UR STRIP.AUTO-MCNC: ABNORMAL MG/DL
KETONES UR STRIP.AUTO-MCNC: ABNORMAL MG/DL
LEUKOCYTE ESTERASE UR QL STRIP.AUTO: ABNORMAL
NITRITE UR QL STRIP.AUTO: ABNORMAL
PH UR STRIP.AUTO: 7 [PH] (ref 5–8)
PROT UR QL STRIP: ABNORMAL MG/DL
RBC UR QL AUTO: ABNORMAL
SP GR UR STRIP.AUTO: 1.01
UROBILINOGEN UR STRIP-MCNC: ABNORMAL MG/DL

## 2017-07-18 PROCEDURE — G0151 HHCP-SERV OF PT,EA 15 MIN: HCPCS

## 2017-07-18 PROCEDURE — 81002 URINALYSIS NONAUTO W/O SCOPE: CPT | Performed by: NURSE PRACTITIONER

## 2017-07-18 PROCEDURE — 87077 CULTURE AEROBIC IDENTIFY: CPT

## 2017-07-18 PROCEDURE — 87186 SC STD MICRODIL/AGAR DIL: CPT

## 2017-07-18 PROCEDURE — G0300 HHS/HOSPICE OF LPN EA 15 MIN: HCPCS

## 2017-07-18 PROCEDURE — G0156 HHCP-SVS OF AIDE,EA 15 MIN: HCPCS

## 2017-07-18 PROCEDURE — 87086 URINE CULTURE/COLONY COUNT: CPT

## 2017-07-18 PROCEDURE — 665999 HH PPS REVENUE DEBIT

## 2017-07-18 PROCEDURE — 665998 HH PPS REVENUE CREDIT

## 2017-07-18 PROCEDURE — 90471 IMMUNIZATION ADMIN: CPT | Performed by: NURSE PRACTITIONER

## 2017-07-18 PROCEDURE — 90632 HEPA VACCINE ADULT IM: CPT | Performed by: NURSE PRACTITIONER

## 2017-07-18 PROCEDURE — 99213 OFFICE O/P EST LOW 20 MIN: CPT | Mod: 25 | Performed by: NURSE PRACTITIONER

## 2017-07-18 ASSESSMENT — ENCOUNTER SYMPTOMS
DEBILITATING PAIN: 1
WEIGHT LOSS: 0
GASTROINTESTINAL NEGATIVE: 1
AGORAPHOBIA: 1
NAUSEA: 0
CARDIOVASCULAR NEGATIVE: 1
SWEATS: 0
WEAKNESS: 0
EYES NEGATIVE: 1
THOUGHT CONTENT - SUSPICIOUS: 1
FLANK PAIN: 1
CHILLS: 0
FEVER: 0
VOMITING: 0
DIAPHORESIS: 0
RESPIRATORY NEGATIVE: 1

## 2017-07-18 NOTE — TELEPHONE ENCOUNTER
Future Appointments       Provider Department [place]    7/19/2017 12:30 AM Ketan Beckwith C.N.A. [facility]     7/19/2017 7:20 AM Torres Brody M.D. [place] [address] [address] [place] [facility]    7/20/2017 9:00 AM Abhishek Baker P.T. [facility]     7/21/2017 To Be Determined [facility] TEAM [facility]     7/21/2017 To Be Determined Karla Marquez C.N.A. [facility]     7/24/2017 11:00 AM Abhishek Baker P.T. [facility]     7/24/2017 To Be Determined Karla Marquez C.N.A. [facility]     7/25/2017 To Be Determined Sinai Hooker R.N. [facility]     7/26/2017 To Be Determined Karla Marquez C.N.A. [facility]     7/28/2017 To Be Determined Joe Ramirez R.N. [facility]     7/28/2017 To Be Determined Karla Marquez C.N.A. [facility]     7/31/2017 To Be Determined Karla Marquez C.N.A. [facility]     8/1/2017 To Be Determined [facility] TEAM [facility]     8/2/2017 To Be Determined [facility]     8/4/2017 To Be Determined Karla Marquez C.N.A. [facility]     8/7/2017 To Be Determined Karla Marquez C.N.A. [facility]     8/8/2017 To Be Determined Joe Ramirez R.N. [facility]     8/9/2017 To Be Determined [facility]     8/11/2017 10:00 AM Sandoval Fox M.D. [facility]     8/11/2017 To Be Determined Karla Marquez C.N.A. [facility]     8/14/2017 To Be Determined Karla Marquez C.N.A. [facility]     8/15/2017 To Be Determined Joe Ramirez R.N. [facility]     8/16/2017 To Be Determined [facility]     8/18/2017 To Be Determined Karla Marquez C.N.A. [facility]     8/21/2017 To Be Determined Karla Marquez C.N.A. [facility]     8/22/2017 To Be Determined [facility]. [facility]     8/23/2017 [facility]     8/24/2017 11:30 AM Ronny Burnette M.D. [facility]    8/25/2017 To Be Determined Karla Marquez C.N.A. [facility]     8/29/2017 8:00 AM Blanca Brantley L[facility]C.S.[facility]    8/29/2017 To Be Determined Sinai Hooker R.N. [facility]     9/18/2017 8:00 AM Blanca Brantley L[facility]    10/4/2017 10:00 AM Blanca Brantley [facility]    10/6/2017 7:20 AM Torres Brody M.D. [address]        ESTABLISHED PATIENT PRE-VISIT PLANNING     Note: Patient will not be contacted if there is no indication to call.     1.  Reviewed note from last office visit with PCP and/or other med group provider: Yes    2.  If any orders were placed at last visit, do we have Results/Consult Notes?        •  Labs - Labs were not ordered at last office visit.       •  Imaging - Imaging was not ordered at last office visit.       •  Referrals - No referrals were ordered at last office visit.    3.  Immunizations were updated in [facility] using WebIZ?: Yes       •  Web Iz Recommendations: HEPATITIS A  and VARICELLA (Chicken Pox)     4.  Patient is due for the following Health Maintenance Topics:   Health Maintenance Due   Topic Date Due   • IMM HEP A VACCINE (1 of 2 - Standard Series) 10/13/1990   • IMM VARICELLA (CHICKENPOX) VACCINE (1 of 2 - 2 Dose Adolescent Series) 10/13/2002           5.  Patient was not informed to arrive 15 min prior to their scheduled appointment and bring in their medication bottles.

## 2017-07-18 NOTE — PROGRESS NOTES
Kristin was discharged to home from [facility] with a diagnosis of type 1 diabetes w/unsp complications and a LACE score of 64.  She was instructed to f/u with her PCP in 1 week, neurology in 2-3 weeks and schedule diabetic education.  [facility] Patient Advocate successfully engaged with the patient on several occasions and confirmed that all medications were filled without incident.  The patient was receiving oxygen through [facility] prior to admission and [facility]  resumed service on 4/17/17.  The patient followed up with Dr. rBody (pcp) on 4/18/17.  She was scheduled to f/u with Dr. Fox (neuro) on 5/3, however this appt was rescheduled. In speaking with the patient, [facility] Patient Advocate learned that Kristin was having bouts of nausea and vomiting.  [facility] Patient Advocate contacted the PCP's MA and was able to have an RX for Phenergan called into the pharmacy.  The following appts were scheduled and kept;   Dr. Brody 4/18  Diabetic Education 4/28  Noman Silva 4/26 lab 5/5  Outpt Infusion 5/9 CT 5/11  [facility] Patient Advocate was able to get an appointment rescheduled for patients bladder pain with Urologist on 5/11   The following appts are scheduled and the patient plans to keep;   Dr. Burnette (behav health) 5/16  Dr. Brody (pcp) 5/23 & 7/19  Infusion 6/6  Dr. Kumar (cardio)7/19   Ruddy (neuro) 8/11

## 2017-07-18 NOTE — PROGRESS NOTES
"Subjective:      Kristin Balderrama is a 26 y.o. female who presents with Dysuria            HPI Comments: Pt main complaint is that she feels that she is not voiding enough urine  States that she is drinking plenty of fluids but feels that it doesn't match the output  Denies noticing any fluid retention or edema  States that she feels that her kidneys hurt at times    Dysuria   This is a new problem. Episode onset: 1-2 days ago. The problem occurs every urination. The problem has been unchanged. Quality: no pain. There has been no fever. She is not sexually active. There is no history of pyelonephritis. Associated symptoms include flank pain. Pertinent negatives include no chills, discharge, frequency, hematuria, hesitancy, nausea, possible pregnancy, sweats, urgency or vomiting. She has tried increased fluids for the symptoms. Her past medical history is significant for recurrent UTIs.       Review of Systems   Constitutional: Negative for fever, chills, weight loss, malaise/fatigue and diaphoresis.   HENT: Negative.    Eyes: Negative.    Respiratory: Negative.    Cardiovascular: Negative.    Gastrointestinal: Negative.  Negative for nausea and vomiting.   Genitourinary: Positive for dysuria and flank pain. Negative for hesitancy, urgency, frequency and hematuria.   Skin: Negative.    Neurological: Negative for weakness.   Psychiatric/Behavioral:        See problem list          Objective:     /76 mmHg  Pulse 85  Temp(Src) 37.1 °C (98.7 °F)  Resp 16  Ht 1.575 m (5' 2\")  Wt 117.935 kg (260 lb)  BMI 47.54 kg/m2  SpO2 93%     Physical Exam   Constitutional: She is oriented to person, place, and time. She appears well-developed and well-nourished. No distress.   Obese female   HENT:   Head: Normocephalic.   Eyes: Conjunctivae and EOM are normal.   Neck: Neck supple.   Cardiovascular: Normal rate, regular rhythm, normal heart sounds and intact distal pulses.    Pulmonary/Chest: Effort normal and breath " sounds normal.   Abdominal: Soft. Bowel sounds are normal. She exhibits no distension and no mass. There is no tenderness. There is no rebound and no guarding. No hernia.   No CVAT   Lymphadenopathy:     She has no cervical adenopathy.   Neurological: She is alert and oriented to person, place, and time.   Skin: Skin is warm and dry.   Vitals reviewed.              Assessment/Plan:     1. Dysuria  Negative in office urine dip today  Will send for culture  Pt is concerned for her kidney function-CMP ordered and pt can go to lab at anytime  Will call with results of test  All questions answered.   - POCT Urinalysis  - URINE CULTURE(NEW); Future  - COMP METABOLIC PANEL; Future    2. Need for vaccination  - HEPATITIS A VACCINE ADULT IM    We discussed ER precautions:  ED precautions: seek emergency evaluation for symptoms including but not limited to : crushing chest pain, chest pain associated with difficulty breathing, nausea, or sweats, heart rate irregular or too fast to count.   -Any change or worsening of signs or symptoms, patient encouraged to follow-up or report to emergency room for further evaluation. Patient understands and agrees

## 2017-07-18 NOTE — MR AVS SNAPSHOT
"        Kristin Armstrong Tacos   2017 1:00 PM   Office Visit   MRN: [id number]    Department:  [address] [place] [address]   Dept Phone:  [phone]    Description:  Female : 1990   Provider:  VANIA Gandhi           Reason for Visit     Dysuria           Allergies as of 2017     Allergen Noted Reactions    Cefdinir 2016   Shortness of Breath, Itching    Tolerated 17    Depakote [Divalproex Sodium] 2010   Unspecified    Muscle spasms/muscle pain and weakness      Abilify 2013   Unspecified    Headaches/muscle twitching      Amitriptyline 10/31/2013   Unspecified    Headaches      Amoxicillin 2010   Rash    Pt states \"I get a rash\".      Ciprofloxacin 2009   Rash    Pt states \"I get a rash\".      Clindamycin 2011   Nausea    Even with food      Doxycycline 08/15/2012   Vomiting, Nausea    RXN=unknown    Ees [Erythromycin] 2010   Vomiting, Nausea    Flagyl [Metronidazole Hcl] 2011   Unspecified    \"eye problems\"      Flomax [Tamsulosin Hydrochloride] 2009   Swelling    Metformin 2013   Unspecified    Increased lactic acid       Sulfa Drugs 2010   Hives, Rash    RXN=since childhood    Tape 08/15/2012   Rash    Tears skin off   coban with Tegaderm tape ok  RXN=ongoing    Vancomycin 07/10/2016   Itching    Pt becomes flushed in face and chest.   RXN=7/10/16    Cephalexin [Keflex] 2017   Rash    Pt states she gets a rash with this medication    Erythromycin 2017       Levofloxacin 10/27/2016   Unspecified    Leg muscle cramps    Metronidazole 2017       Valproic Acid 2017         You were diagnosed with     Dysuria   [788.1.ICD-9-CM]       Need for vaccination   [212445]         Vital Signs     Blood Pressure Pulse Temperature Respirations Height Weight    122/76 mmHg 85 37.1 °C (98.7 °F) 16 1.575 m (5' 2\") 117.935 kg (260 lb)    Body Mass Index Oxygen Saturation Smoking Status             47.54 kg/m2 93% Never " Smoker          Basic Information     Date Of Birth Sex Race Ethnicity Preferred Language    1990 Female White Non- English      Your appointments     Jul 19, 2017 12:30 AM   AIDE-HH-HOME VISIT with Ketan Beckwith C.N.A.   [facility] (--)    [address]  [place] [address]   [phone]            Jul 19, 2017  7:20 AM   Established Patient with Torres Brody M.D.   [facility] [address]    [address]  [place] [address]   [phone]           You will be receiving a confirmation call a few days before your appointment from our automated call confirmation system.            Jul 20, 2017  9:00 AM   FK-OJ-LJQREHHAQL DISCHARGE with Abhishek Baker P.TEffie   [facility] (--)    [address]  [place] [address]   [phone]            Jul 21, 2017 To Be Determined   SN-HH-HOME VISIT with [facility] HH TEAM [facility] (--)    [address]  [place] [address]   [phone]            Jul 21, 2017 To Be Determined   AIDE-HH-HOME VISIT with Karla Marquez C.N.A.   [facility] (--)    [address]  [place] [address]   [phone]            Jul 24, 2017 11:00 AM   ZH-QD-URQBSNAEYI DISCHARGE with CLIVE Negro.CARL   [facility] (--)    [address]  [place] [address]   [phone]            Jul 24, 2017 To Be Determined   AIDE-HH-HOME VISIT with Karla Marquez C.N.A.   [facility] (--)    [address]  [place] [address]   [phone]            Jul 25, 2017 To Be Determined   SN-HH-HOME VIS+LPN SUP+HHA SUP with Sinai Hooker R.N.   [facility] (--)    [address]  [place] [address]   [phone]            Jul 26, 2017 To Be Determined   AIDE-HH-HOME VISIT with Karla Marquez C.N.A.   [facility] (--)    [facility]  [facility] [address]   [phone]            Jul 28, 2017 To Be Determined   SN-HH-HOME VISIT with Joe Ramirez R.N.   [facility] (--)    [address]  [place] [address]   [phone]            Jul 28, 2017 To Be Determined   AIDE-HH-HOME VISIT with Karla Marquez C.N.A.   [facility] (--)    [address]  [place] [address]   [phone]            Jul 31, 2017 To Be Determined   AIDE-HH-HOME VISIT with Karla Marquez C.N.A.   [facility] (--)    [address]  [place] [address]   [phone]            Aug 01, 2017 To Be Determined   SN-HH-HOME VISIT with [facility] HH TEAM [facility] (--)    [address]  [place] [address]   [phone]            Aug 02, 2017 To Be Determined   AIDE-HH-HOME VISIT with [facility] Aide   [facility] (--)    [address]  [place] [address]   [phone]            Aug 04, 2017 To Be Determined   AIDE-HH-HOME VISIT with Karla Marquez C.N.A.   [facility] (--)    [address]  [place] [address]   [phone]            Aug 07, 2017 To Be Determined   AIDE-HH-HOME VISIT with Karla Marquez C.N.A.   [facility] (--)    [address]  [place] [address]   [phone]            Aug 08, 2017 To Be Determined   SN-HH-HOME VISIT with Joe Ramirez R.N.   [facility] (--)    [address]  [place] [address]   [phone]            Aug 09, 2017 To Be Determined   AIDE-HH-HOME VISIT with [facility] Aide   [facility] (--)    [address]  [place] [address]   [phone]            Aug 11, 2017 10:00 AM   Follow Up Visit with Sandoval Fox M.D.   [facility] (--)    [address]  [facility] [id number] [phone]           You will be receiving a confirmation call a few days before your appointment from our automated call confirmation system.            Aug 11, 2017 To Be Determined   AIDE-HH-HOME VISIT with Karla Marquez C.N.A.   [facility] (--)    [address]  [facility] [address]   [phone]            Aug 14, 2017 To Be  Determined   AIDE-HH-HOME VISIT with Karla Marquez C.N.A.   [facility] (--)    [address]  [place] [address]   [phone]            Aug 15, 2017 To Be Determined   SN-HH-HOME VISIT with Joe Ramirez R.N.   [facility] (--)    [address]  [place] [address]   [phone]            Aug 16, 2017 To Be Determined   AIDE-HH-HOME VISIT with [facility] Aide   [facility] (--)    [address]  [place] [address]   [phone]            Aug 18, 2017 To Be Determined   AIDE-HH-HOME VISIT with Karla Marquez C.N.A.   [facility] (--)    [address]  [place] [address]   [phone]            Aug 21, 2017 To Be Determined   AIDE-HH-HOME VISIT with Karla Marquez C.N.A.   [facility] (--)    [address]  [place] [address]   [phone]            Aug 22, 2017 To Be Determined   SN-HH-HOME VISIT with Joe Ramirez R.N.   [facility] (--)    [address]  [place] [address]   [phone]            Aug 23, 2017 To Be Determined   AIDE-HH-HOME VISIT with [facility] Aide   [facility] (--)    [address]  [place] [address]   [phone]            Aug 24, 2017 11:30 AM   Follow Up Med Management with Ronny Burnette M.D.   [facility] [address] [place] [address])    [address] [place] [address]  [address]  [place] [address]   [phone]            Aug 25, 2017 To Be Determined   AIDE-HH-HOME VISIT with Karla Marquez C.N.A.   [facility] (--)    [address]  [place] [address]   [phone]            Aug 29, 2017  8:00 AM   Individual Therapy with Blanca Brantley L.C.S.W.   [facility]    [address]  [address]  [place] [id number] [phone]            Aug 29, 2017 To Be Determined   SN--HOME VISIT with Sinai Hooker R.N.   [facility] (--)    [address] [facility]  [place] [address]   [phone]            Sep 18, 2017  8:00 AM   Individual Therapy with Blanca rBantley,  ALICIA   [facility] ([facility]    [address]  [address]  [place] [address]   [phone]            Oct 04, 2017 10:00 AM   Individual Therapy with Blanca Brantley L.C.S.W.   [facility] ([place]    [address]  [address]  [place] [address]   [phone]            Oct 06, 2017  7:20 AM   Established Patient with Torres Brody M.D.   [facility] [address] [place] [address]    [address]  [place] [address]   [phone]           You will be receiving a confirmation call a few days before your appointment from our automated call confirmation system.              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 Low  9/18/2010 - Present    Neurogenic bladder N31.9 Low  4/2/2011 - Present    Sinus tachycardia (Chronic) R00.0 High  10/31/2013 - Present    Knee pain, right M25.561 Low  2/13/2014 - Present    Anxiety F41.9 Low  12/16/2014 - Present    Fatty liver disease, nonalcoholic K76.0 Low  1/19/2015 - Present    Progressive focal motor weakness R53.1 Low  6/28/2015 - Present    Acquired hypothyroidism E03.9 Low  11/23/2015 - Present    PCOS (polycystic ovarian syndrome) E28.2 Low  11/23/2015 - Present    H/O prior ablation treatment Z98.890   2/10/2016 - Present    Peripheral neuropathy (CMS-HCC) (Chronic) G62.9   3/6/2016 - Present    TONYA on CPAP G47.33, Z99.89 Low  3/7/2016 - Present    Morbidly obese (CMS-HCC) E66.01   3/7/2016 - Present    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   3/7/2016 - Present    Scoliosis M41.9   3/7/2016 - Present    GERD (gastroesophageal reflux disease) (Chronic) K21.9   3/7/2016 - Present    Vitamin D deficiency E55.9   5/21/2016 - Present    Chronic inflammatory arthritis (Chronic) M19.90 Medium  5/23/2016 - Present    Weakness of both lower extremities R29.898   6/22/2016 - Present    Elevated sedimentation rate  R70.0   6/27/2016 - Present    Galactorrhea O92.6   7/22/2016 - Present    Psychosis, schizophrenia, simple (HCC) (Chronic) F20.89 Low Chronic 9/29/2016 - Present    Schizophrenia (CMS-HCC) F20.9 Low  10/27/2016 - Present    Chronic pain syndrome (Chronic) G89.4   10/27/2016 - Present    Bowel and bladder incontinence R32, R15.9   10/27/2016 - Present    Depression F32.9 Low  10/28/2016 - Present    HTN (hypertension) (Chronic) I10   11/1/2016 - Present    Hypovitaminosis D E55.9   11/29/2016 - Present    Leg weakness R29.898   1/4/2017 - Present    Weakness R53.1   1/22/2017 - Present    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 High  1/24/2017 - Present    Chronic suprapubic catheter (CMS-HCC) (Chronic) Z93.59   2/16/2017 - Present    Leg weakness, bilateral R29.898   2/22/2017 - Present    Weakness of right upper extremity R29.898   2/23/2017 - Present    Chest pain R07.9   3/30/2017 - Present    On home oxygen therapy (Chronic) Z99.81   4/15/2017 - Present    Dysuria R30.0   5/9/2017 - Present    UTI (urinary tract infection) N39.0   5/13/2017 - Present    Weakness R53.1   5/13/2017 - Present    Enterococcus faecalis infection B95.2   5/13/2017 - Present    Fall at home W19.XXXA, Y92.099   5/13/2017 - Present    Hashimoto's encephalopathy E06.3, G93.49   5/17/2017 - Present    Rash R21   6/9/2017 - Present    IGT (impaired glucose tolerance) R73.02   7/6/2017 - Present      Health Maintenance        Date Due Completion Dates    IMM HEP A VACCINE (1 of 2 - Standard Series) 10/13/1990 ---    IMM VARICELLA (CHICKENPOX) VACCINE (1 of 2 - 2 Dose Adolescent Series) 10/13/2002 ---    IMM INFLUENZA (1) 9/1/2017 10/5/2016, 9/5/2013, 12/7/2011, 11/7/2011    A1C SCREENING 1/6/2018 7/6/2017, 6/28/2017, 4/6/2017, 12/7/2016, 10/14/2016, 3/9/2016, 9/5/2014    URINE ACR / MICROALBUMIN 5/5/2018 5/5/2017, 3/7/2017, 12/7/2016, 10/14/2016, 7/28/2016    RETINAL SCREENING 5/23/2018 5/23/2017    DIABETES MONOFILAMENT / LE EXAM  "5/23/2018 5/23/2017    FASTING LIPID PROFILE 7/6/2018 7/6/2017, 3/7/2017, 2/24/2017, 12/7/2016, 10/28/2016, 10/14/2016, 3/21/2014    SERUM CREATININE 7/6/2018 7/6/2017, 6/21/2017, 5/18/2017, 5/17/2017, 5/14/2017, 5/13/2017, 5/5/2017, 4/14/2017, 4/13/2017, 4/12/2017, 4/5/2017, 3/27/2017, 3/18/2017, 3/17/2017, 3/16/2017, 3/11/2017, 3/10/2017, 3/9/2017, 3/7/2017, 2/25/2017, 2/24/2017, 2/23/2017, 2/22/2017, 1/30/2017, 1/27/2017, 1/25/2017, 1/22/2017, 1/22/2017, 1/18/2017, 1/18/2017, 12/7/2016, 12/6/2016, 11/4/2016, 11/3/2016, 11/2/2016, 11/1/2016, 10/28/2016, 10/27/2016, 10/14/2016, 10/4/2016, 10/3/2016, 9/29/2016, 8/16/2016, 7/28/2016, 7/28/2016, 7/10/2016, 6/25/2016, 6/23/2016, 6/22/2016, 6/7/2016, 5/18/2016, 4/19/2016, 4/3/2016, 3/30/2016, 3/29/2016, 3/28/2016, 3/14/2016, 3/10/2016, 3/9/2016, 3/7/2016, 3/6/2016, 2/15/2016, 2/12/2016, 1/29/2016, 1/28/2016, 1/26/2016, 11/28/2015, 11/27/2015, 11/23/2015, 11/22/2015, 7/9/2015, 7/8/2015, 6/27/2015, 4/14/2015, 3/23/2015, 2/18/2015, 10/27/2014, 9/5/2014, 3/21/2014, 12/24/2013, 1/12/2013, 8/24/2012, 8/23/2012, 8/22/2012, 8/15/2012, 4/21/2012, 4/20/2012, 4/19/2012, 9/17/2011, 4/3/2011, 4/2/2011, 3/31/2011, 9/20/2010, 9/19/2010, 9/18/2010, 8/28/2010, 7/20/2010, 1/30/2007    PAP SMEAR 7/22/2019 7/22/2016 (Postponed), 2/19/2013 (N/S)    Override on 7/22/2016: Postponed (per pt was told could \"skip\" 2016)    Override on 2/19/2013: (N/S) ([place])    IMM DTaP/Tdap/Td Vaccine (7 - Td) 8/6/2022 8/6/2012, 9/18/2010, 3/3/1994, 5/17/1991, 5/10/1990, 3/23/1990, 1990    COLONOSCOPY 9/25/2023 9/25/2013            Current Immunizations     Dtap Vaccine 3/3/1994, 5/17/1991, 5/10/1990, 3/23/1990, 1990    HIB Vaccine(PEDVAX) 1/11/1991    HPV Quadrivalent Vaccine (GARDASIL) 10/27/2011, 5/27/2011, 3/1/2011    Hepatitis A Vaccine, Adult  Incomplete    Hepatitis B Vaccine Non-Recombivax (Ped/Adol) 1/28/2004, 10/28/2003, 10/29/1999    Influenza TIV (IM) 9/5/2013, 12/7/2011, 11/7/2011    " Influenza Vaccine Adult HD 10/5/2016    MMR Vaccine 3/3/1994, 1/11/1991    OPV - Historical Data 3/3/1994, 5/17/1991, 5/10/1990, 3/23/1990, 1990    Pneumococcal Vaccine (PCV7) Historical Data 11/8/2015    Pneumococcal polysaccharide vaccine (PPSV-23) 1/23/2017  5:35 PM, 1/14/2017    TD Vaccine 9/18/2010  4:45 PM    Tdap Vaccine 8/6/2012      Below and/or attached are the medications your provider expects you to take. Review all of your home medications and newly ordered medications with your provider and/or pharmacist. Follow medication instructions as directed by your provider and/or pharmacist. Please keep your medication list with you and share with your provider. Update the information when medications are discontinued, doses are changed, or new medications (including over-the-counter products) are added; and carry medication information at all times in the event of emergency situations     Allergies:  CEFDINIR - Shortness of Breath,Itching     DEPAKOTE - Unspecified     ABILIFY - Unspecified     AMITRIPTYLINE - Unspecified     AMOXICILLIN - Rash     CIPROFLOXACIN - Rash     CLINDAMYCIN - Nausea     DOXYCYCLINE - Vomiting,Nausea     EES - Vomiting,Nausea     FLAGYL - Unspecified     FLOMAX - Swelling     METFORMIN - Unspecified     SULFA DRUGS - Hives,Rash     TAPE - Rash     VANCOMYCIN - Itching     CEPHALEXIN - Rash     ERYTHROMYCIN - (reactions not documented)     LEVOFLOXACIN - Unspecified     METRONIDAZOLE - (reactions not documented)     VALPROIC ACID - (reactions not documented)               Medications  Valid as of: July 18, 2017 -  1:25 PM    Generic Name Brand Name Tablet Size Instructions for use    Albuterol Sulfate (Aero Soln) albuterol 108 (90 BASE) MCG/ACT Inhale 2 Puffs by mouth every 6 hours as needed for Shortness of Breath.        Aspirin (Tablet Delayed Response) ECOTRIN 81 MG Take 1 Tab by mouth every day.        Baclofen (Tab) LIORESAL 10 MG Take 1 Tab by mouth 3 times a day.         Cefdinir (Cap) OMNICEF 300 MG Take 300 mg by mouth 2 times a day. Indications: Acute Infection of the Middle Ear        Cranberry (Tab) Cranberry 400 MG Take 2 Tabs by mouth every day.        Cyanocobalamin (Tab) vitamin b12 500 MCG Take 1,000 mcg by mouth 2 times a day.        Ergocalciferol (Cap) DRISDOL 42116 UNITS Take 50,000 Units by mouth every Friday.        FentaNYL (PATCH 72 HR) DURAGESIC 25 MCG/HR Apply 1 Patch to skin as directed every 72 hours.        FLUoxetine HCl (Cap) PROZAC 10 MG TAKE ONE CAPSULE BY MOUTH ONCE A DAY        Furosemide (Tab) LASIX 40 MG Take 40 mg by mouth every day.        Gabapentin (Cap) NEURONTIN 300 MG Take 600 mg by mouth 3 times a day.        Gabapentin (Once-Daily) (Tab) GRALISE 600 MG Take 600 mg by mouth.        Insulin Pen Needle (Misc) BD PEN NEEDLE MALLY U/F 32G X 4 MM         Ivabradine HCl (Tab) CORLANOR 5 MG Take 1 Tab by mouth 2 times a day, with meals.        Lactobacillus (Pack) LACTINEX/FLORANEX  Take 1 Packet by mouth 3 times a day, with meals.        Lactulose (Solution) lactulose 10 GM/15ML Take 10 g by mouth 2 times a day as needed.        Levothyroxine Sodium (Tab) SYNTHROID 75 MCG Take 75 mcg by mouth Every morning on an empty stomach.        Melatonin (Tab) Melatonin 5 MG Take 10 mg by mouth every bedtime.        Mirabegron (TABLET SR 24 HR) Mirabegron ER 25 MG Take 1 Tab by mouth every day.        Nitrofurantoin Monohyd Macro (Cap) MACROBID 100 MG Take 1 Cap by mouth 2 times a day, with meals.        Nitroglycerin (SL Tab) NITROSTAT 0.3 MG PLACE 1 TABLET UNDER TONGUE IF NEEDED FOR CHEST PAIN EVERY 5 MINUTES FOR 3 DOSES AS DIRECTED        non-formulary med non-formulary med  Inhale 2 L/min by mouth Continuous. OXYGEN  Indications: TONYA        Oxycodone-Acetaminophen (Tab) PERCOCET-10  MG Take 1-2 Tabs by mouth every 6 hours as needed for Severe Pain. Scheduled.        Promethazine HCl (Tab) PHENERGAN 25 MG Take 1 Tab by mouth every 6 hours as needed for  Nausea/Vomiting.        SITagliptin Phosphate (Tab) JANUVIA 100 MG Take 1 Tab by mouth every day.        Sodium Bicarbonate (Tab) sodium bicarbonate 325 MG Take 2 Tabs by mouth 3 times a day.        TraMADol HCl (Tab) ULTRAM 50 MG         Ziprasidone HCl (Cap) GEODON 80 MG TAKE 2 CAPSULES BY MOUTH NIGHTLY AT BEDTIME        .                 Medicines prescribed today were sent to:     [id number] - [place], [place] - [address] [place] [address]    Phone: [phone] Fax: [phone]    Open 24 Hours?: No      Medication refill instructions:       If your prescription bottle indicates you have medication refills left, it is not necessary to call your provider’s office. Please contact your pharmacy and they will refill your medication.    If your prescription bottle indicates you do not have any refills left, you may request refills at any time through one of the following ways: The online [employer] system (except Urgent Care), by calling your provider’s office, or by asking your pharmacy to contact your provider’s office with a refill request. Medication refills are processed only during regular business hours and may not be available until the next business day. Your provider may request additional information or to have a follow-up visit with you prior to refilling your medication.   *Please Note: Medication refills are assigned a new Rx number when refilled electronically. Your pharmacy may indicate that no refills were authorized even though a new prescription for the same medication is available at the pharmacy. Please request the medicine by name with the pharmacy before contacting your provider for a refill.        Your To Do List     Future Labs/Procedures Complete By Expires    COMP METABOLIC PANEL  As directed 7/19/2018    URINE CULTURE(NEW)  As directed 7/18/2018      Other Notes About Your Plan     DME:  [facility] / ph [phone] / fax [phone]              [facility]  Access Code: Activation code not generated  Current MyChart Status: Active

## 2017-07-19 ENCOUNTER — HOME CARE VISIT (OUTPATIENT)
Dept: HOME HEALTH SERVICES | Facility: HOME HEALTHCARE | Age: 27
End: 2017-07-19
Payer: MEDICARE

## 2017-07-19 ENCOUNTER — OFFICE VISIT (OUTPATIENT)
Dept: MEDICAL GROUP | Facility: MEDICAL CENTER | Age: 27
End: 2017-07-19
Payer: MEDICARE

## 2017-07-19 ENCOUNTER — HOSPITAL ENCOUNTER (EMERGENCY)
Facility: MEDICAL CENTER | Age: 27
End: 2017-07-19
Attending: EMERGENCY MEDICINE
Payer: MEDICARE

## 2017-07-19 VITALS
TEMPERATURE: 98.1 F | SYSTOLIC BLOOD PRESSURE: 120 MMHG | DIASTOLIC BLOOD PRESSURE: 78 MMHG | OXYGEN SATURATION: 96 % | WEIGHT: 260 LBS | RESPIRATION RATE: 16 BRPM | HEIGHT: 62 IN | HEART RATE: 76 BPM | BODY MASS INDEX: 47.84 KG/M2

## 2017-07-19 VITALS
WEIGHT: 260 LBS | SYSTOLIC BLOOD PRESSURE: 161 MMHG | BODY MASS INDEX: 47.84 KG/M2 | DIASTOLIC BLOOD PRESSURE: 76 MMHG | RESPIRATION RATE: 18 BRPM | OXYGEN SATURATION: 94 % | HEIGHT: 62 IN | HEART RATE: 61 BPM | TEMPERATURE: 97 F

## 2017-07-19 VITALS
DIASTOLIC BLOOD PRESSURE: 73 MMHG | HEART RATE: 69 BPM | TEMPERATURE: 97.3 F | RESPIRATION RATE: 18 BRPM | SYSTOLIC BLOOD PRESSURE: 123 MMHG

## 2017-07-19 DIAGNOSIS — T50.901A OVERDOSE, ACCIDENTAL OR UNINTENTIONAL, INITIAL ENCOUNTER: Primary | ICD-10-CM

## 2017-07-19 DIAGNOSIS — R73.02 IGT (IMPAIRED GLUCOSE TOLERANCE): ICD-10-CM

## 2017-07-19 DIAGNOSIS — R30.0 DYSURIA: ICD-10-CM

## 2017-07-19 LAB
ALBUMIN SERPL BCP-MCNC: 4 G/DL (ref 3.2–4.9)
ALBUMIN/GLOB SERPL: 1.5 G/DL
ALP SERPL-CCNC: 70 U/L (ref 30–99)
ALT SERPL-CCNC: 30 U/L (ref 2–50)
ANION GAP SERPL CALC-SCNC: 11 MMOL/L (ref 0–11.9)
APAP SERPL-MCNC: <10 UG/ML (ref 10–30)
AST SERPL-CCNC: 30 U/L (ref 12–45)
BASOPHILS # BLD AUTO: 0.5 % (ref 0–1.8)
BASOPHILS # BLD: 0.04 K/UL (ref 0–0.12)
BILIRUB SERPL-MCNC: 0.4 MG/DL (ref 0.1–1.5)
BUN SERPL-MCNC: 10 MG/DL (ref 8–22)
CALCIUM SERPL-MCNC: 9.7 MG/DL (ref 8.5–10.5)
CHLORIDE SERPL-SCNC: 104 MMOL/L (ref 96–112)
CO2 SERPL-SCNC: 21 MMOL/L (ref 20–33)
CREAT SERPL-MCNC: 0.74 MG/DL (ref 0.5–1.4)
EOSINOPHIL # BLD AUTO: 0.15 K/UL (ref 0–0.51)
EOSINOPHIL NFR BLD: 2 % (ref 0–6.9)
ERYTHROCYTE [DISTWIDTH] IN BLOOD BY AUTOMATED COUNT: 41 FL (ref 35.9–50)
ETHANOL BLD-MCNC: 0 G/DL
GFR SERPL CREATININE-BSD FRML MDRD: >60 ML/MIN/1.73 M 2
GLOBULIN SER CALC-MCNC: 2.6 G/DL (ref 1.9–3.5)
GLUCOSE SERPL-MCNC: 103 MG/DL (ref 65–99)
HCG SERPL QL: NEGATIVE
HCT VFR BLD AUTO: 38 % (ref 37–47)
HGB BLD-MCNC: 13 G/DL (ref 12–16)
IMM GRANULOCYTES # BLD AUTO: 0.02 K/UL (ref 0–0.11)
IMM GRANULOCYTES NFR BLD AUTO: 0.3 % (ref 0–0.9)
LYMPHOCYTES # BLD AUTO: 2.24 K/UL (ref 1–4.8)
LYMPHOCYTES NFR BLD: 29.7 % (ref 22–41)
MCH RBC QN AUTO: 30.2 PG (ref 27–33)
MCHC RBC AUTO-ENTMCNC: 34.2 G/DL (ref 33.6–35)
MCV RBC AUTO: 88.2 FL (ref 81.4–97.8)
MONOCYTES # BLD AUTO: 0.5 K/UL (ref 0–0.85)
MONOCYTES NFR BLD AUTO: 6.6 % (ref 0–13.4)
NEUTROPHILS # BLD AUTO: 4.6 K/UL (ref 2–7.15)
NEUTROPHILS NFR BLD: 60.9 % (ref 44–72)
NRBC # BLD AUTO: 0 K/UL
NRBC BLD AUTO-RTO: 0 /100 WBC
PLATELET # BLD AUTO: 278 K/UL (ref 164–446)
PMV BLD AUTO: 11.7 FL (ref 9–12.9)
POTASSIUM SERPL-SCNC: 4.5 MMOL/L (ref 3.6–5.5)
PROT SERPL-MCNC: 6.6 G/DL (ref 6–8.2)
RBC # BLD AUTO: 4.31 M/UL (ref 4.2–5.4)
SALICYLATES SERPL-MCNC: 0 MG/DL (ref 15–25)
SODIUM SERPL-SCNC: 136 MMOL/L (ref 135–145)
WBC # BLD AUTO: 7.6 K/UL (ref 4.8–10.8)

## 2017-07-19 PROCEDURE — 93005 ELECTROCARDIOGRAM TRACING: CPT | Performed by: EMERGENCY MEDICINE

## 2017-07-19 PROCEDURE — 84703 CHORIONIC GONADOTROPIN ASSAY: CPT

## 2017-07-19 PROCEDURE — 99284 EMERGENCY DEPT VISIT MOD MDM: CPT

## 2017-07-19 PROCEDURE — 94760 N-INVAS EAR/PLS OXIMETRY 1: CPT

## 2017-07-19 PROCEDURE — G0156 HHCP-SVS OF AIDE,EA 15 MIN: HCPCS

## 2017-07-19 PROCEDURE — A9270 NON-COVERED ITEM OR SERVICE: HCPCS | Performed by: EMERGENCY MEDICINE

## 2017-07-19 PROCEDURE — 80053 COMPREHEN METABOLIC PANEL: CPT

## 2017-07-19 PROCEDURE — 36415 COLL VENOUS BLD VENIPUNCTURE: CPT

## 2017-07-19 PROCEDURE — 700105 HCHG RX REV CODE 258: Performed by: EMERGENCY MEDICINE

## 2017-07-19 PROCEDURE — 99213 OFFICE O/P EST LOW 20 MIN: CPT | Performed by: INTERNAL MEDICINE

## 2017-07-19 PROCEDURE — 85025 COMPLETE CBC W/AUTO DIFF WBC: CPT

## 2017-07-19 PROCEDURE — 700102 HCHG RX REV CODE 250 W/ 637 OVERRIDE(OP): Performed by: EMERGENCY MEDICINE

## 2017-07-19 PROCEDURE — 665999 HH PPS REVENUE DEBIT

## 2017-07-19 PROCEDURE — 80307 DRUG TEST PRSMV CHEM ANLYZR: CPT

## 2017-07-19 PROCEDURE — 665998 HH PPS REVENUE CREDIT

## 2017-07-19 RX ORDER — DIAZEPAM 5 MG/ML
5 INJECTION, SOLUTION INTRAMUSCULAR; INTRAVENOUS ONCE
Status: DISCONTINUED | OUTPATIENT
Start: 2017-07-19 | End: 2017-07-19

## 2017-07-19 RX ORDER — DIAZEPAM 5 MG/1
5 TABLET ORAL ONCE
Status: COMPLETED | OUTPATIENT
Start: 2017-07-19 | End: 2017-07-19

## 2017-07-19 RX ORDER — SODIUM CHLORIDE 9 MG/ML
1000 INJECTION, SOLUTION INTRAVENOUS ONCE
Status: COMPLETED | OUTPATIENT
Start: 2017-07-19 | End: 2017-07-19

## 2017-07-19 RX ADMIN — DIAZEPAM 5 MG: 5 TABLET ORAL at 18:18

## 2017-07-19 RX ADMIN — SODIUM CHLORIDE 1000 ML: 9 INJECTION, SOLUTION INTRAVENOUS at 18:19

## 2017-07-19 ASSESSMENT — PAIN SCALES - GENERAL: PAINLEVEL_OUTOF10: 8

## 2017-07-19 ASSESSMENT — LIFESTYLE VARIABLES: DO YOU DRINK ALCOHOL: NO

## 2017-07-19 NOTE — ED AVS SNAPSHOT
Scout Access Code: Activation code not generated  Current Scout Status: Active    [employer]  A secure, online tool to manage your health information     [employer]’s Scout® is a secure, online tool that connects you to your personalized health information from the privacy of your home -- day or night - making it very easy for you to manage your healthcare. Once the activation process is completed, you can even access your medical information using the Scout rocio, which is available for free in the Apple Rocio store or Google Play store.     Scout provides the following levels of access (as shown below):   My Chart Features   [facility] Primary Care Doctor [facility]  Specialists [facility]  Urgent  Care Non-[facility]  Primary Care  Doctor   Email your healthcare team securely and privately 24/7 X X X X   Manage appointments: schedule your next appointment; view details of past/upcoming appointments X      Request prescription refills. X      View recent personal medical records, including lab and immunizations X X X X   View health record, including health history, allergies, medications X X X X   Read reports about your outpatient visits, procedures, consult and ER notes X X X X   See your discharge summary, which is a recap of your hospital and/or ER visit that includes your diagnosis, lab results, and care plan. X X       How to register for Scout:  1. Go to  [email or web address].  2. Click on the Sign Up Now box, which takes you to the New Member Sign Up page. You will need to provide the following information:  a. Enter your Scout Access Code exactly as it appears at the top of this page. (You will not need to use this code after you’ve completed the sign-up process. If you do not sign up before the expiration date, you must request a new code.)   b. Enter your date of birth.   c. Enter your home email address.   d. Click Submit, and follow the next screen’s instructions.  3. Create a Scout ID. This will  be your [employer] login ID and cannot be changed, so think of one that is secure and easy to remember.  4. Create a [employer] password. You can change your password at any time.  5. Enter your Password Reset Question and Answer. This can be used at a later time if you forget your password.   6. Enter your e-mail address. This allows you to receive e-mail notifications when new information is available in [employer].  7. Click Sign Up. You can now view your health information.    For assistance activating your [employer] account, call [phone]

## 2017-07-19 NOTE — PROGRESS NOTES
CC: Follow-up blood sugars.    HPI:   Kristin presents today with the following.    1. IGT (impaired glucose tolerance)  Presents no longer getting IV steroids. She is taking Januvia and reports blood sugars dipping down to the 50s. There was noted by the home health nurse with reports that she took large amounts of steroids were patient states showing to 20 mg. She only checks her blood sugars when she feels poorly. She does have a very poor diet nephrostomy drinking a large [employer]. She does not have a blood sugar log with her today.      Patient Active Problem List    Diagnosis Date Noted   • Paraparesis of both lower limbs (CMS-HCC) 01/24/2017     Priority: High   • Sinus tachycardia 10/31/2013     Priority: High   • Chronic inflammatory arthritis 05/23/2016     Priority: Medium   • Depression 10/28/2016     Priority: Low   • Schizophrenia (CMS-HCC) 10/27/2016     Priority: Low   • Psychosis, schizophrenia, simple ([facility]) 09/29/2016     Priority: Low     Class: Chronic   • TONYA on CPAP 03/07/2016     Priority: Low   • Acquired hypothyroidism 11/23/2015     Priority: Low   • PCOS (polycystic ovarian syndrome) 11/23/2015     Priority: Low   • Progressive focal motor weakness 06/28/2015     Priority: Low   • Fatty liver disease, nonalcoholic 01/19/2015     Priority: Low   • Anxiety 12/16/2014     Priority: Low   • Knee pain, right 02/13/2014     Priority: Low   • Neurogenic bladder 04/02/2011     Priority: Low   • Chronic UTI 09/18/2010     Priority: Low   • Multiple personality disorder 09/18/2010     Priority: Low   • IGT (impaired glucose tolerance) 07/06/2017   • Rash 06/09/2017   • Hashimoto's encephalopathy 05/17/2017   • UTI (urinary tract infection) 05/13/2017   • Weakness 05/13/2017   • Enterococcus faecalis infection 05/13/2017   • Fall at home 05/13/2017   • Dysuria 05/09/2017   • On home oxygen therapy 04/15/2017   • Chest pain 03/30/2017   • Weakness of right upper extremity 02/23/2017   • Leg weakness,  bilateral 02/22/2017   • Chronic suprapubic catheter (CMS-HCC) 02/16/2017   • Weakness 01/22/2017   • Leg weakness 01/04/2017   • Hypovitaminosis D 11/29/2016   • HTN (hypertension) 11/01/2016   • Chronic pain syndrome 10/27/2016   • Bowel and bladder incontinence 10/27/2016   • Galactorrhea 07/22/2016   • Elevated sedimentation rate 06/27/2016   • Weakness of both lower extremities 06/22/2016   • Vitamin D deficiency 05/21/2016   • Morbidly obese (CMS-HCC) 03/07/2016   • Conversion disorder 03/07/2016   • Scoliosis 03/07/2016   • GERD (gastroesophageal reflux disease) 03/07/2016   • Peripheral neuropathy (CMS-HCC) 03/06/2016   • H/O prior ablation treatment 02/10/2016       Current Outpatient Prescriptions   Medication Sig Dispense Refill   • sitagliptin (JANUVIA) 100 MG Tab Take 1 Tab by mouth every day. 30 Tab 6   • BD PEN NEEDLE MALLY U/F 32G X 4 MM Misc      • tramadol (ULTRAM) 50 MG Tab      • non-formulary med Inhale 2 L/min by mouth Continuous. OXYGEN  Indications: TONYA     • cefdinir (OMNICEF) 300 MG Cap Take 300 mg by mouth 2 times a day. Indications: Acute Infection of the Middle Ear     • ziprasidone (GEODON) 80 MG Cap TAKE 2 CAPSULES BY MOUTH NIGHTLY AT BEDTIME 60 Cap 5   • GRALISE 600 MG Tab Take 600 mg by mouth.     • nitroGLYCERIN (NITROSTAT) 0.3 MG SL tablet PLACE 1 TABLET UNDER TONGUE IF NEEDED FOR CHEST PAIN EVERY 5 MINUTES FOR 3 DOSES AS DIRECTED  0   • Mirabegron ER (MYRBETRIQ) 25 MG TABLET SR 24 HR Take 1 Tab by mouth every day.     • nitrofurantoin monohydr macro (MACROBID) 100 MG Cap Take 1 Cap by mouth 2 times a day, with meals. 8 Cap 0   • lactobacillus granules (LACTINEX/FLORANEX) Pack Take 1 Packet by mouth 3 times a day, with meals.     • oxycodone-acetaminophen (PERCOCET-10)  MG Tab Take 1-2 Tabs by mouth every 6 hours as needed for Severe Pain. Scheduled. (Patient taking differently: Take 2 Tabs by mouth 2 times a day. Scheduled.) 1 Tab 0   • levothyroxine (SYNTHROID) 75 MCG Tab  Take 75 mcg by mouth Every morning on an empty stomach.     • albuterol 108 (90 BASE) MCG/ACT Aero Soln inhalation aerosol Inhale 2 Puffs by mouth every 6 hours as needed for Shortness of Breath.     • lactulose 10 GM/15ML Solution Take 10 g by mouth 2 times a day as needed.     • furosemide (LASIX) 40 MG Tab Take 40 mg by mouth every day.     • gabapentin (NEURONTIN) 300 MG Cap Take 600 mg by mouth 3 times a day.     • Cranberry 400 MG Tab Take 2 Tabs by mouth every day.     • promethazine (PHENERGAN) 25 MG Tab Take 1 Tab by mouth every 6 hours as needed for Nausea/Vomiting. 30 Tab 6   • aspirin EC (ECOTRIN) 81 MG Tablet Delayed Response Take 1 Tab by mouth every day. 30 Tab 6   • baclofen (LIORESAL) 10 MG Tab Take 1 Tab by mouth 3 times a day. 90 Tab 6   • ivabradine (CORLANOR) 5 MG Tab tablet Take 1 Tab by mouth 2 times a day, with meals. 60 Tab 6   • fluoxetine (PROZAC) 10 MG Cap TAKE ONE CAPSULE BY MOUTH ONCE A DAY 30 Cap 2   • Melatonin 5 MG Tab Take 10 mg by mouth every bedtime.     • fentanyl (DURAGESIC) 25 MCG/HR PATCH 72 HR Apply 1 Patch to skin as directed every 72 hours.     • vitamin D, Ergocalciferol, (DRISDOL) 04762 UNITS Cap capsule Take 50,000 Units by mouth every Friday.     • cyanocobalamin (HM VITAMIN B12) 500 MCG tablet Take 1,000 mcg by mouth 2 times a day.     • sodium bicarbonate 325 MG Tab Take 2 Tabs by mouth 3 times a day.       No current facility-administered medications for this visit.         Allergies as of 07/19/2017 - Joe as Reviewed 07/19/2017   Allergen Reaction Noted   • Cefdinir Shortness of Breath and Itching 03/01/2016   • Depakote [divalproex sodium] Unspecified 06/14/2010   • Abilify Unspecified 01/17/2013   • Amitriptyline Unspecified 10/31/2013   • Amoxicillin Rash 09/18/2010   • Ciprofloxacin Rash 12/17/2009   • Clindamycin Nausea 02/02/2011   • Doxycycline Vomiting and Nausea 08/15/2012   • Ees [erythromycin] Vomiting and Nausea 08/28/2010   • Flagyl [metronidazole hcl]  "Unspecified 03/31/2011   • Flomax [tamsulosin hydrochloride] Swelling 09/24/2009   • Metformin Unspecified 07/23/2013   • Sulfa drugs Hives and Rash 09/18/2010   • Tape Rash 08/15/2012   • Vancomycin Itching 07/10/2016   • Cephalexin [keflex] Rash 01/01/2017   • Erythromycin  03/30/2017   • Levofloxacin Unspecified 10/27/2016   • Metronidazole  03/30/2017   • Valproic acid  03/30/2017        ROS: As per HPI.    /78 mmHg  Pulse 76  Temp(Src) 36.7 °C (98.1 °F)  Resp 16  Ht 1.575 m (5' 2.01\")  Wt 117.935 kg (260 lb)  BMI 47.54 kg/m2  SpO2 96%    Physical Exam:  Gen:         Alert and oriented, No apparent distress.  Neck:        No Lymphadenopathy or Bruits.  Lungs:     Clear to auscultation bilaterally  CV:          Regular rate and rhythm. No murmurs, rubs or gallops.               Ext:          No clubbing, cyanosis, edema.      Assessment and Plan.   26 y.o. female with the following issues.    1. IGT (impaired glucose tolerance)  Discussion today about cutting Januvia and half below some likely culprit for causing hypoglycemia. Recommended getting away from carbohydrates more protein based diet. She will bring in a blood sugar meter to review the results in the system. She is not testing regularly have encouraged her to test once daily for right now.        "

## 2017-07-19 NOTE — ED AVS SNAPSHOT
Home Care Instructions                                                                                                                Kristin Balderrama   MRN: [id number]    Department:  [facility], Emergency Dept   Date of Visit:  7/19/2017            [facility], Emergency Dept    [address]    [place] [address]    Phone:  [phone]      You were seen by     Junie Miranda D.O.      Your Diagnosis Was     Overdose, accidental or unintentional, initial encounter     T50.901A       These are the medications you received during your hospitalization from 07/19/2017 1702 to 07/19/2017 2115     Date/Time Order Dose Route Action    07/19/2017 1819 NS infusion 1,000 mL 1,000 mL Intravenous New Bag    07/19/2017 1818 diazepam (VALIUM) tablet 5 mg 5 mg Oral Given      Follow-up Information     1. Follow up with Torres Brody M.D. In 1 day.    Specialty:  Internal Medicine    Contact information    [address]  [place] [address] [phone]        Medication Information     Review all of your home medications and newly ordered medications with your primary doctor and/or pharmacist as soon as possible. Follow medication instructions as directed by your doctor and/or pharmacist.     Please keep your complete medication list with you and share with your physician. Update the information when medications are discontinued, doses are changed, or new medications (including over-the-counter products) are added; and carry medication information at all times in the event of emergency situations.               Medication List      ASK your doctor about these medications        Instructions    Morning Afternoon Evening Bedtime    albuterol 108 (90 BASE) MCG/ACT Aers inhalation aerosol        Inhale 2 Puffs by mouth every 6 hours as needed for Shortness of Breath.   Dose:  2 Puff                        aspirin EC 81 MG Tbec   Commonly known as:  ECOTRIN        Take 1 Tab  by mouth every day.   Dose:  81 mg                        baclofen 10 MG Tabs   Commonly known as:  LIORESAL        Take 1 Tab by mouth 3 times a day.   Dose:  10 mg                        BD PEN NEEDLE MALLY U/F 32G X 4 MM Misc   Generic drug:  Insulin Pen Needle                             cefdinir 300 MG Caps   Commonly known as:  OMNICEF        Take 300 mg by mouth 2 times a day. Indications: Acute Infection of the Middle Ear   Dose:  300 mg                        Cranberry 400 MG Tabs        Take 2 Tabs by mouth every day.   Dose:  2 Tab                        fentanyl 25 MCG/HR Pt72   Commonly known as:  DURAGESIC        Apply 1 Patch to skin as directed every 72 hours.   Dose:  1 Patch                        fluoxetine 10 MG Caps   Commonly known as:  PROZAC        TAKE ONE CAPSULE BY MOUTH ONCE A DAY                        furosemide 40 MG Tabs   Commonly known as:  LASIX        Take 40 mg by mouth every day.   Dose:  40 mg                        gabapentin 300 MG Caps   Commonly known as:  NEURONTIN        Take 600 mg by mouth 3 times a day.   Dose:  600 mg                        GRALISE 600 MG Tabs   Generic drug:  Gabapentin (Once-Daily)        Take 600 mg by mouth.   Dose:  600 mg                        HM VITAMIN B12 500 MCG tablet   Generic drug:  cyanocobalamin        Take 1,000 mcg by mouth 2 times a day.   Dose:  1000 mcg                        ivabradine 5 MG Tabs tablet   Commonly known as:  CORLANOR        Take 1 Tab by mouth 2 times a day, with meals.   Dose:  5 mg                        lactobacillus granules Pack        Take 1 Packet by mouth 3 times a day, with meals.   Dose:  1 Packet                        lactulose 10 GM/15ML Soln        Take 10 g by mouth 2 times a day as needed.   Dose:  10 g                        levothyroxine 75 MCG Tabs   Commonly known as:  SYNTHROID        Take 75 mcg by mouth Every morning on an empty stomach.   Dose:  75 mcg                        Melatonin 5 MG  Tabs        Doctor's comments:  Takes two tabs at Bedtime (10 mg.)   Take 10 mg by mouth every bedtime.   Dose:  10 mg                        MYRBETRIQ 25 MG Tb24   Generic drug:  Mirabegron ER        Take 1 Tab by mouth every day.   Dose:  1 Tab                        nitrofurantoin monohydr macro 100 MG Caps   Commonly known as:  MACROBID        Take 1 Cap by mouth 2 times a day, with meals.   Dose:  100 mg                        nitroGLYCERIN 0.3 MG SL tablet   Commonly known as:  NITROSTAT        PLACE 1 TABLET UNDER TONGUE IF NEEDED FOR CHEST PAIN EVERY 5 MINUTES FOR 3 DOSES AS DIRECTED                        non-formulary med        Inhale 2 L/min by mouth Continuous. OXYGEN  Indications: TONYA   Dose:  2 L/min                        oxycodone-acetaminophen  MG Tabs   Commonly known as:  PERCOCET-10        Take 1-2 Tabs by mouth every 6 hours as needed for Severe Pain. Scheduled.   Dose:  1-2 Tab                        promethazine 25 MG Tabs   Commonly known as:  PHENERGAN        Take 1 Tab by mouth every 6 hours as needed for Nausea/Vomiting.   Dose:  25 mg                        sitagliptin 100 MG Tabs   Commonly known as:  JANUVIA        Take 1 Tab by mouth every day.   Dose:  50 mg                        sodium bicarbonate 325 MG Tabs        Take 2 Tabs by mouth 3 times a day.   Dose:  650 mg                        tramadol 50 MG Tabs   Commonly known as:  ULTRAM                             vitamin D (Ergocalciferol) 36497 UNITS Caps capsule   Commonly known as:  DRISDOL        Take 50,000 Units by mouth every Friday.   Dose:  72327 Units                        ziprasidone 80 MG Caps   Commonly known as:  GEODON        TAKE 2 CAPSULES BY MOUTH NIGHTLY AT BEDTIME                                Procedures and tests performed during your visit     Acetaminophen Level    Blood Alcohol    CBC WITH DIFFERENTIAL    COMP METABOLIC PANEL    Cardiac Monitoring    EKG (NOW)    ESTIMATED GFR    HCG QUAL SERUM     "IV Saline Lock    Pulse Ox    SALICYLATE LEVEL        Discharge Instructions       Follow-up with primary care and psychiatry this week for reevaluation, medication management and close blood pressure monitoring.    Allowing her grandmother to help you manage medications, use pill organizer for ease of dosing.    Continue home medications as previously indicated.    Return to the emergency department for altered mental status, vomiting, palpitations, syncope, cramping, suicidal or homicidal ideation or other new concerns.    Overdose, Adult  A person can overdose on alcohol, drugs or both by accident or on purpose. If it was on purpose, it is a serious matter. Professional help should be sought. If the overdose was an accident, certain steps should be taken to make sure that it never happens again.  ACCIDENTAL OVERDOSE  Overdosing on prescription medications can be a result of:  · Not understanding the instructions.  · Misreading the label.  · Forgetting that you took a dose and then taking another by mistake. This situation happens a lot.  To make sure this does not happen again:  · Clarify the correct dosage with your caregiver.  · Place the correct dosage in a \"pill-minder\" container (labeled for each day and time of day).  · Have someone dispense your medicine.  Please be sure to follow-up with your primary care doctor as directed.  INTENTIONAL OVERDOSE  If the overdose was on purpose, it is a serious situation. Taking more than the prescribed amount of medications (including taking someone else's prescription), abusing street drugs or drinking an amount of alcohol that requires medical treatment can show a variety of possible problems. These may indicate you:  · Are depressed or suicidal.  · Are abusing drugs, took too much or combined different drugs to experiment with the effects.  · Mixed alcohol with drugs and did not realize the danger of doing so (this is drug abuse).  · Are suffering addiction to drugs " "and/or alcohol (also known as chemical dependency).  · Binge drink.  If you have not been referred to a mental health professional for help, it is important that you get help right away. Only a professional can determine which problems may exist and what the best course of treatment may be. It is your responsibility to follow-up with further evaluation or treatment as directed.   Alcohol is responsible for a large number of overdoses and unintended deaths among college-age young adults. Binge drinking is consuming 4-5 drinks in a short period of time. The amount of alcohol in standard servings of wine (5 oz.), beer (12 oz.) and distilled spirits (1.5 oz., 80 proof) is the same. Beer or wine can be just as dangerous to the binge drinker as \"hard\" liquor can be.   CONSEQUENCES OF BINGE DRINKING  Alcohol poisoning is the most serious consequence of binge drinking. This is a severe and potentially fatal physical reaction to an alcohol overdose. When too much alcohol is consumed, the brain does not get enough oxygen. The lack of oxygen will eventually cause the brain to shut down the voluntary functions that regulate breathing and heart rate. Symptoms of alcohol poisoning include:  · Vomiting.  · Passing out (unconsciousness).  · Cold, clammy, pale or bluish skin.  · Slow or irregular breathing.  WHAT SHOULD I DO NEXT?  If you have a history of drug abuse or suffer chemical dependency (alcoholism, drug addiction or both), you might consider the following:  · Talk with a qualified substance abuse counselor and consider entering a treatment program.  · Go to a detox facility if necessary.  · If you were attending self-help group meetings, consider returning to them and go often.  · Explore other resources located near you (see sources listed below).  If you are unsure if you have a substance abuse problem, ask yourself the following questions:  · Have you been told by friends or family that drugs/alcohol has become a " "problem?  · Do you get into fights when drinking or using drugs?  · Do you have blackouts (not remembering what you do while using)?  · Do you lie about use or amounts of drugs or alcohol you consume?  · Do you need chemicals to get you going?  · Do you suffer in work or school performance because of drug or alcohol use?  · Do you get sick from drug or alcohol use but continue to use anyway?  · Do you need drugs or alcohol to relate to people or feel comfortable in social situations?  · Do you use drugs or alcohol to forget problems?  If you answered \"Yes\" to any of the above questions, it means you show signs of chemical dependency and a professional evaluation is suggested. The longer the use of drugs and alcohol continues, the problems will become greater.  SEEK IMMEDIATE MEDICAL CARE IF:   · You feel like you might repeat your problematic behavior.  · You need someone to talk to and feel that it should not wait.  · You feel you are a danger to yourself or someone else.  · You feel like you are having a new reaction to medications you are taking, or you are getting worse after leaving a care center.  · You have an overwhelming urge to drink or use drugs.  Addiction cannot be cured, but it can be treated successfully. Treatment centers are listed in the yellow pages under: Cocaine, Narcotics, and Alcoholics Anonymous. Most hospitals and clinics can refer you to a specialized care center. The  government maintains a toll-free number for obtaining treatment referrals: 1-251.741.9814 or 1-716.415.3746 (TDD) and maintains a website: http://findtreatment.samhsa.gov. Other websites for additional information are: www.mentalhealth.samhsa.gov. and www.alyssa.gov.  In [place] treatment resources are listed in each Province with listings available under [employer] or similar titles.  Document Released: 12/20/2004 Document Revised: 03/11/2013 Document Reviewed: 11/11/2009  ExitCare® Patient Information " ©2014 [employer] [facility].    Nontoxic Ingestion  Nontoxic ingestion means that the substance you have swallowed (ingested) is not likely to cause serious medical problems. Further treatment is not needed at this time. However, the effects of drugs or other substances can sometimes be delayed, so you should watch your condition for any changes.  HOME CARE INSTRUCTIONS  · Take medicines only as directed by your health care provider.  · Follow instructions from your health care provider about eating or drinking restrictions. If you have vomited since ingesting the substance, you may need to avoid eating or drinking for a few hours. After that, you can start with small sips of clear liquids until your stomach settles.  · Do not drink alcohol or use illegal drugs.  · Keep all follow-up visits as directed by your health care provider. This is important.  SEEK MEDICAL CARE IF:  · You have a fever.  · You have vomiting.  SEEK IMMEDIATE MEDICAL CARE IF:  · You have difficulty walking.  · You have confusion or agitation.  · You are overly tired.  · You have difficulty breathing.  · You have difficulty swallowing or you have a lot of mucus.  · You have a seizure.  · You have profuse sweating.  · You have a cough.  · You have abdominal pain, repeated vomiting, or severe diarrhea.  · You have weakness.  · You have a fast or irregular heartbeat (palpitations).  · You have signs of dehydration, such as:  ¨ Severe thirst.  ¨ Dry lips and mouth.  ¨ Dizziness.  ¨ Dark urine or a decreasing need to urinate.  ¨ Rapid breathing or rapid pulse.     This information is not intended to replace advice given to you by your health care provider. Make sure you discuss any questions you have with your health care provider.     Document Released: 01/25/2006 Document Revised: 05/03/2016 Document Reviewed: 11/11/2015  [employer] Interactive Patient Education ©2016 [employer]              Patient Information     Patient Information    Following  emergency treatment: all patient requiring follow-up care must return either to a private physician or a clinic if your condition worsens before you are able to obtain further medical attention, please return to the emergency room.     Billing Information    At [facility], we work to make the billing process streamlined for our patients.  Our Representatives are here to answer any questions you may have regarding your hospital bill.  If you have insurance coverage and have supplied your insurance information to us, we will submit a claim to your insurer on your behalf.  Should you have any questions regarding your bill, we can be reached online or by phone as follows:  Online: You are able pay your bills online or live chat with our representatives about any billing questions you may have. We are here to help Monday - Friday from 8:00am to 7:30pm and 9:00am - 12:00pm on Saturdays.  Please visit [email or web address]  for more information.   Phone:  [phone] or [phone]    Please note that your emergency physician, surgeon, pathologist, radiologist, anesthesiologist, and other specialists are not employed by [facility] and will therefore bill separately for their services.  Please contact them directly for any questions concerning their bills at the numbers below:     Emergency Physician Services:  [phone]  [facility]:  [phone]  [facility]:  [phone]  [facility]:  [phone]    1. Your final bill may vary from the amount quoted upon discharge if all procedures are not complete at that time, or if your doctor has additional procedures of which we are not aware. You will receive an additional bill if you return to the Emergency Department at [facility] for suture removal regardless of the facility of which the sutures were placed.     2. Please arrange for settlement of this account at the emergency  registration.    3. All self-pay accounts are due in full at the time of treatment.  If you are unable to meet this obligation then payment is expected within 4-5 days.     4. If you have had radiology studies (CT, X-ray, Ultrasound, MRI), you have received a preliminary result during your emergency department visit. Please contact the radiology department [phone] to receive a copy of your final result. Please discuss the Final result with your primary physician or with the follow up physician provided.     Crisis Hotline:  [place] Crisis Hotline:  0-122-JWDUQFW or 1-479.543.1263  [place] Crisis Hotline:    1-323.265.2707 or 714-927-5674         ED Discharge Follow Up Questions    1. In order to provide you with very good care, we would like to follow up with a phone call in the next few days.  May we have your permission to contact you?     YES /  NO    2. What is the best phone number to call you? (       )_____-__________    3. What is the best time to call you?      Morning  /  Afternoon  /  Evening                   Patient Signature:  ____________________________________________________________    Date:  ____________________________________________________________      Your appointments     Jul 20, 2017  9:00 AM   SI-MM-LUXOTTXIQR DISCHARGE with Abhishek Baker P.T.   [facility] (--)    [address] [place] [address]   [phone]            Jul 21, 2017 To Be Determined   SN-HH-HOME VISIT with [facility] TEAM [facility] (--)    [address] [place] [address]   [phone]            Jul 21, 2017 To Be Determined   AIDE-HH-HOME VISIT with Karla Marquez C.N.A.   [facility] (--)    [facility]  [place] [address]   [phone]            Jul 24, 2017 To Be Determined   AIDE-HH-HOME VISIT with Karla Marquez C.N.A.   [facility] (--)    [facility]  [place] [address]   [phone]            Jul 25, 2017 To Be Determined   SN-HH-HOME  VIS+LPN SUP+HHA SUP with Sinai Hooker R.N.   [facility] (--)    [address]  [place] [address]   [phone]            Jul 26, 2017 To Be Determined   AIDE-HH-HOME VISIT with Karla Marquez C.N.A.   [facility] (--)    [address]  [place] [address]   [phone]            Jul 28, 2017 To Be Determined   SN-HH-HOME VISIT with Joe Rmairez R.N.   [facility] (--)    [address]  [place] [address]   [phone]            Jul 28, 2017 To Be Determined   AIDE-HH-HOME VISIT with Karla Marquez C.N.A.   [facility] (--)    [address]  [place] [address]   [phone]            Jul 31, 2017 To Be Determined   AIDE-HH-HOME VISIT with Karla Marquez C.N.A.   [facility] (--)    [address]  [place] [address]   [phone]            Aug 01, 2017 To Be Determined   SN-HH-HOME VISIT with [facility] TEAM [facility] (--)    [address]  [place] [address]   [phone]            Aug 02, 2017 To Be Determined   [facility]HH[facility] (--)    [address]  [place] [address]   [phone]            Aug 04, 2017 To Be Determined   AIDE-HH-HOME VISIT with Karla Marquez C.N.A.   [facility] (--)    [address]  [place] [address]   [phone]            Aug 07, 2017 To Be Determined   AIDE-HH-HOME VISIT with Karla Marquez C.N.A.   [facility] (--)    [address]  [place] [address]   [phone]            Aug 08, 2017 To Be Determined   SN-HH-HOME VISIT with Joe Ramirez R.N.   [facility] (--)    [address]  [facility] [address]   [phone]            Aug 09, 2017 To Be Determined   AIDE-HH-HOME VISIT with [facility] Aide   [facility] (--)    [address]  [facility] [address]   [phone]            Aug 11, 2017 10:00 AM   Follow Up Visit with Sandoval Fox M.D.   [facility] (--)     [address], [address]  [facility] [id number] [phone]           You will be receiving a confirmation call a few days before your appointment from our automated call confirmation system.            Aug 11, 2017 To Be Determined   AIDE-HH-HOME VISIT with Karla Marquez C.N.A.   [facility] (--)    [address]  [place] [address]   [phone]            Aug 14, 2017 To Be Determined   AIDE-HH-HOME VISIT with Karla Marquez C.N.A.   [facility] (--)    [address]  [place] [address]   [phone]            Aug 15, 2017 To Be Determined   SN-HH-HOME VISIT with Joe Ramirez R.N.   [facility] (--)    [address]  [place] [address]   [phone]            Aug 16, 2017 To Be Determined   AIDE-HH-HOME VISIT with [facility] Aide   [facility] (--)    [address]  [place] [address]   [phone]            Aug 18, 2017 To Be Determined   AIDE-HH-HOME VISIT with Karla Marquez C.N.A.   [facility] (--)    [address]  [place] [address]   [phone]            Aug 21, 2017 To Be Determined   AIDE-HH-HOME VISIT with Karla Marquez C.N.A.   [facility] (--)    [address]  [place] [address]   [phone]            Aug 22, 2017 To Be Determined   SN-HH-HOME VISIT with Joe Ramirez R.N.   [facility] (--)    [address]  [place] [address]   [phone]            Aug 23, 2017 To Be Determined   AIDE-HH-HOME VISIT with [facility] Aide   [facility] (--)    [address]  [place] [address]   [phone]            Aug 24, 2017 11:30 AM   Follow Up Med Management with Ronny Burnette M.D.   [facility] [address] [facility])    [address] [facility]  [address]  [place] [address]   [phone]            Aug 25, 2017 To Be Determined   AIDE-HH-HOME VISIT with Karla Marquez C.N.A.   [facility] (--)    [facility] [address]  [place] [address]   [phone]            Aug 29, 2017  8:00  AM   Individual Therapy with Blanca Brantley L.C.S.W.   [facility]    [address]  [address]  [place] [address]   [phone]            Aug 29, 2017 To Be Determined   SN-HH-HOME VISIT with Sinai Hooker R.N.   [facility] (--)    [address] [place] [address].  [place] [address]   [phone]            Sep 18, 2017  8:00 AM   Individual Therapy with Blanca Brantley L.C.S.W.   [facility] ([place])    [address]  [address]  [place] [address]   [phone]            Oct 04, 2017 10:00 AM   Individual Therapy with Blanca Brantley L.C.S.W.   [facility]    [address]  [address]  [place] [address]   [phone]            Oct 06, 2017  7:20 AM   Established Patient with Torres Brody M.D.   [facility] [address]    [address]  [place] [address]   [phone]           You will be receiving a confirmation call a few days before your appointment from our automated call confirmation system.

## 2017-07-19 NOTE — ED AVS SNAPSHOT
7/19/2017    Kristin Balderrama  [address] [facility] [address]    Dear Kristin:    [facility] wants to ensure your discharge home is safe and you or your loved ones have had all of your questions answered regarding your care after you leave the hospital.    Below is a list of resources and contact information should you have any questions regarding your hospital stay, follow-up instructions, or active medical symptoms.    Questions or Concerns Regarding… Contact   Medical Questions Related to Your Discharge  (7 days a week, 8am-5pm) Contact a Nurse Care Coordinator   [phone]   Medical Questions Not Related to Your Discharge  (24 hours a day / 7 days a week)  Contact the Nurse Health Line   [phone]    Medications or Discharge Instructions Refer to your discharge packet   or contact your [facility] Primary Care Provider   [phone]   Follow-up Appointment(s) Schedule your appointment via [employer]   or contact Scheduling [phone]   Billing Review your statement via [employer]  or contact Billing [phone]   Medical Records Review your records via [employer]   or contact Medical Records [phone]     You may receive a telephone call within two days of discharge. This call is to make certain you understand your discharge instructions and have the opportunity to have any questions answered. You can also easily access your medical information, test results and upcoming appointments via the [employer] free online health management tool. You can learn more and sign up at [employer]. For assistance setting up your [employer] account, please call [phone].    Once again, we want to ensure your discharge home is safe and that you have a clear understanding of any next steps in your care. If you have any questions or concerns, please do not hesitate to contact us, we are here for you. Thank you for choosing [facility] for your healthcare needs.    Sincerely,    Your [facility] Healthcare Team

## 2017-07-19 NOTE — MR AVS SNAPSHOT
"        Kristin Balderrama   2017 7:20 AM   Office Visit   MRN: [id number]    Department:  [address] [place] [address]   Dept Phone:  [phone]    Description:  Female : 1990   Provider:  Torres Brody M.D.           Allergies as of 2017     Allergen Noted Reactions    Cefdinir 2016   Shortness of Breath, Itching    Tolerated 17    Depakote [Divalproex Sodium] 2010   Unspecified    Muscle spasms/muscle pain and weakness      Abilify 2013   Unspecified    Headaches/muscle twitching      Amitriptyline 10/31/2013   Unspecified    Headaches      Amoxicillin 2010   Rash    Pt states \"I get a rash\".      Ciprofloxacin 2009   Rash    Pt states \"I get a rash\".      Clindamycin 2011   Nausea    Even with food      Doxycycline 08/15/2012   Vomiting, Nausea    RXN=unknown    Ees [Erythromycin] 2010   Vomiting, Nausea    Flagyl [Metronidazole Hcl] 2011   Unspecified    \"eye problems\"      Flomax [Tamsulosin Hydrochloride] 2009   Swelling    Metformin 2013   Unspecified    Increased lactic acid       Sulfa Drugs 2010   Hives, Rash    RXN=since childhood    Tape 08/15/2012   Rash    Tears skin off   coban with Tegaderm tape ok  RXN=ongoing    Vancomycin 07/10/2016   Itching    Pt becomes flushed in face and chest.   RXN=7/10/16    Cephalexin [Keflex] 2017   Rash    Pt states she gets a rash with this medication    Erythromycin 2017       Levofloxacin 10/27/2016   Unspecified    Leg muscle cramps    Metronidazole 2017       Valproic Acid 2017         You were diagnosed with     IGT (impaired glucose tolerance)   [754845]         Vital Signs     Blood Pressure Pulse Temperature Respirations Height Weight    120/78 mmHg 76 36.7 °C (98.1 °F) 16 1.575 m (5' 2.01\") 117.935 kg (260 lb)    Body Mass Index Oxygen Saturation Smoking Status             47.54 kg/m2 96% Never Smoker          Basic Information     Date Of Birth Sex " Race Ethnicity Preferred Language    1990 Female White Non- English      Your appointments     Jul 20, 2017  9:00 AM   CO-KY-LTOPIGUJHO DISCHARGE with Abhishek Baker P.T.   [facility] (--)    [address]  [place] [address]   [phone]            Jul 21, 2017 To Be Determined   SN-HH-HOME VISIT with [facility] TEAM [facility] (--)    [address]  [place] [address]   [phone]            Jul 21, 2017 To Be Determined   AIDE-HH-HOME VISIT with Karla Marquez C.N.A.   [facility] (--)    [address]  [place] [address]   [phone]            Jul 24, 2017 11:00 AM   KU-UP-ATDKDPJYLK DISCHARGE with Abhishek Baker P.T.   [facility] (--)    [address]  [place] [address]   [phone]            Jul 24, 2017 To Be Determined   AIDE-HH-HOME VISIT with Karla Marquez C.N.A.   [facility] (--)    [address]  [place] [address]   [phone]            Jul 25, 2017 To Be Determined   SN-HH-HOME VIS+LPN SUP+HHA SUP with Sinai Hooker R.N.   [facility] (--)    [address]  [place] [address]   [phone]            Jul 26, 2017 To Be Determined   AIDE-HH-HOME VISIT with Karla Marquez C.N.A.   [facility] (--)    [address]  [place] [address]   [phone]            Jul 28, 2017 To Be Determined   SN-HH-HOME VISIT with Joe Ramirez R.N.   [facility] (--)    [address]  [place] [id number] [phone]            Jul 28, 2017 To Be Determined   AIDE-HH-HOME VISIT with Karla Marquez C.N.A.   [facility] (--)    [facility] [address]  [facility] [address]   [phone]            Jul 31, 2017 To Be Determined   AIDE-HH-HOME VISIT with Karla Marquez C.N.A.   [facility] (--)    [address]  [facility] [address]   [phone]            Aug 01, 2017 To Be Determined   SN-HH-HOME VISIT with [facility] TEAM [facility] (--)    [address]  [place] [address]   [phone]            Aug 02, 2017 To Be Determined   AIDE-HH-HOME VISIT with [facility] Aide   [facility] (--)    [address]  [place] [address]   [phone]            Aug 04, 2017 To Be Determined   AIDE-HH-HOME VISIT with Karla Marquez C.N.A.   [facility] (--)    [address]  [place] [address]   [phone]            Aug 07, 2017 To Be Determined   AIDE-HH-HOME VISIT with Karla Marquez C.N.A.   [facility] (--)    [address]  [place] [address]   [phone]            Aug 08, 2017 To Be Determined   SN-HH-HOME VISIT with Joe Ramirez R.N.   [facility] (--)    [address]  [place] [address]   [phone]            Aug 09, 2017 To Be Determined   AIDE-HH-HOME VISIT with [facility] Aide   [facility] (--)    [address]  [place] [address]   [phone]            Aug 11, 2017 10:00 AM   Follow Up Visit with Sandoval Fox M.D.   [facility] (--)    [address] [place] [address], [address]  [facility] [address]   [phone]           You will be receiving a confirmation call a few days before your appointment from our automated call confirmation system.            Aug 11, 2017 To Be Determined   AIDE-HH-HOME VISIT with Karla Marquez C.N.A.   [facility] (--)    [address]  [place] [address]   [phone]            Aug 14, 2017 To Be Determined   AIDE-HH-HOME VISIT with Karla Marquez C.N.A.   [facility] (--)    [address]  [place] [address]   [phone]            Aug 15, 2017 To Be Determined   SN-HH-HOME VISIT with Joe Ramirez R.N.   [facility] (--)    [address]  [facility] [address]   [phone]            Aug 16, 2017 To Be Determined   AIDE-HH-HOME VISIT with [facility] Aide   [facility] (--)    [address]  [facility] [address]   [phone]            Aug 18, 2017 To Be Determined   AIDE-HH-HOME VISIT  with Karla Marquez C.N.A.   [facility] (--)    [address]  [place] [address]   [phone]            Aug 21, 2017 To Be Determined   AIDE-HH-HOME VISIT with Karla Marquez C.N.A.   [facility] (--)    [address]  [place] [address]   [phone]            Aug 22, 2017 To Be Determined   SN-HH-HOME VISIT with Joe Ramirez R.N.   [facility] (--)    [address]  [place] [address]   [phone]            Aug 23, 2017 To Be Determined   AIDE-HH-HOME VISIT with [facility] Aide   [facility] (--)    [address]  [place] [address]   [phone]            Aug 24, 2017 11:30 AM   Follow Up Med Management with Ronny Burnette M.D.   [facility] [address] [place] [address])    [address] [place] [address]  [address]  [place] [address]   [phone]            Aug 25, 2017 To Be Determined   AIDE-HH-HOME VISIT with Karla Marquez C.N.A.   [facility] (--)    [address]  [place] [address]   [phone]            Aug 29, 2017  8:00 AM   Individual Therapy with Blanca Brantley L.C.S.W.   [facility] ([facility]    [address]  [place] [address] [phone]            Aug 29, 2017 To Be Determined   SN-HH-HOME VISIT with Sinai Hooker R.N.   [facility] (--)    [address]  [place] [address]   [phone]            Sep 18, 2017  8:00 AM   Individual Therapy with Blanca Brantley L.C.S.W.   [facility] ([facility]    [address]  [address]  [place] [address] [phone]            Oct 04, 2017 10:00 AM   Individual Therapy with Blanca Brantley L.C.S.W.   [facility] ([facility])    [address]  [place] [address] [phone]            Oct 06, 2017  7:20 AM   Established Patient with Torres Brody M.D.   [facility] [address]    [address]  [facility] [address]  [place] [address] [phone]           You will be receiving a confirmation call a  few days before your appointment from our automated call confirmation system.              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 Low  9/18/2010 - Present    Neurogenic bladder N31.9 Low  4/2/2011 - Present    Sinus tachycardia (Chronic) R00.0 High  10/31/2013 - Present    Knee pain, right M25.561 Low  2/13/2014 - Present    Anxiety F41.9 Low  12/16/2014 - Present    Fatty liver disease, nonalcoholic K76.0 Low  1/19/2015 - Present    Progressive focal motor weakness R53.1 Low  6/28/2015 - Present    Acquired hypothyroidism E03.9 Low  11/23/2015 - Present    PCOS (polycystic ovarian syndrome) E28.2 Low  11/23/2015 - Present    H/O prior ablation treatment Z98.890   2/10/2016 - Present    Peripheral neuropathy (CMS-HCC) (Chronic) G62.9   3/6/2016 - Present    TONYA on CPAP G47.33, Z99.89 Low  3/7/2016 - Present    Morbidly obese (CMS-HCC) E66.01   3/7/2016 - Present    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   3/7/2016 - Present    Scoliosis M41.9   3/7/2016 - Present    GERD (gastroesophageal reflux disease) (Chronic) K21.9   3/7/2016 - Present    Vitamin D deficiency E55.9   5/21/2016 - Present    Chronic inflammatory arthritis (Chronic) M19.90 Medium  5/23/2016 - Present    Weakness of both lower extremities R29.898   6/22/2016 - Present    Elevated sedimentation rate R70.0   6/27/2016 - Present    Galactorrhea O92.6   7/22/2016 - Present    Psychosis, schizophrenia, simple (HCC) (Chronic) F20.89 Low Chronic 9/29/2016 - Present    Schizophrenia (CMS-HCC) F20.9 Low  10/27/2016 - Present    Chronic pain syndrome (Chronic) G89.4   10/27/2016 - Present    Bowel and bladder incontinence R32, R15.9   10/27/2016 - Present    Depression F32.9 Low  10/28/2016 - Present    HTN (hypertension) (Chronic) I10   11/1/2016 - Present    Hypovitaminosis D E55.9   11/29/2016 - Present    Leg weakness R29.890    1/4/2017 - Present    Weakness R53.1   1/22/2017 - Present    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 High  1/24/2017 - Present    Chronic suprapubic catheter (CMS-[facility]) (Chronic) Z93.59   2/16/2017 - Present    Leg weakness, bilateral R29.898   2/22/2017 - Present    Weakness of right upper extremity R29.898   2/23/2017 - Present    Chest pain R07.9   3/30/2017 - Present    On home oxygen therapy (Chronic) Z99.81   4/15/2017 - Present    Dysuria R30.0   5/9/2017 - Present    UTI (urinary tract infection) N39.0   5/13/2017 - Present    Weakness R53.1   5/13/2017 - Present    Enterococcus faecalis infection B95.2   5/13/2017 - Present    Fall at home W19.XXXA, Y92.099   5/13/2017 - Present    Hashimoto's encephalopathy E06.3, G93.49   5/17/2017 - Present    Rash R21   6/9/2017 - Present    IGT (impaired glucose tolerance) R73.02   7/6/2017 - Present      Health Maintenance        Date Due Completion Dates    IMM VARICELLA (CHICKENPOX) VACCINE (1 of 2 - 2 Dose Adolescent Series) 10/13/2002 ---    IMM INFLUENZA (1) 9/1/2017 10/5/2016, 9/5/2013, 12/7/2011, 11/7/2011    A1C SCREENING 1/6/2018 7/6/2017, 6/28/2017, 4/6/2017, 12/7/2016, 10/14/2016, 3/9/2016, 9/5/2014    IMM HEP A VACCINE (2 of 2 - Standard Series) 1/18/2018 7/18/2017    URINE ACR / MICROALBUMIN 5/5/2018 5/5/2017, 3/7/2017, 12/7/2016, 10/14/2016, 7/28/2016    RETINAL SCREENING 5/23/2018 5/23/2017    DIABETES MONOFILAMENT / LE EXAM 5/23/2018 5/23/2017    FASTING LIPID PROFILE 7/6/2018 7/6/2017, 3/7/2017, 2/24/2017, 12/7/2016, 10/28/2016, 10/14/2016, 3/21/2014    SERUM CREATININE 7/6/2018 7/6/2017, 6/21/2017, 5/18/2017, 5/17/2017, 5/14/2017, 5/13/2017, 5/5/2017, 4/14/2017, 4/13/2017, 4/12/2017, 4/5/2017, 3/27/2017, 3/18/2017, 3/17/2017, 3/16/2017, 3/11/2017, 3/10/2017, 3/9/2017, 3/7/2017, 2/25/2017, 2/24/2017, 2/23/2017, 2/22/2017, 1/30/2017, 1/27/2017, 1/25/2017, 1/22/2017, 1/22/2017, 1/18/2017, 1/18/2017, 12/7/2016, 12/6/2016, 11/4/2016, 11/3/2016,  "11/2/2016, 11/1/2016, 10/28/2016, 10/27/2016, 10/14/2016, 10/4/2016, 10/3/2016, 9/29/2016, 8/16/2016, 7/28/2016, 7/28/2016, 7/10/2016, 6/25/2016, 6/23/2016, 6/22/2016, 6/7/2016, 5/18/2016, 4/19/2016, 4/3/2016, 3/30/2016, 3/29/2016, 3/28/2016, 3/14/2016, 3/10/2016, 3/9/2016, 3/7/2016, 3/6/2016, 2/15/2016, 2/12/2016, 1/29/2016, 1/28/2016, 1/26/2016, 11/28/2015, 11/27/2015, 11/23/2015, 11/22/2015, 7/9/2015, 7/8/2015, 6/27/2015, 4/14/2015, 3/23/2015, 2/18/2015, 10/27/2014, 9/5/2014, 3/21/2014, 12/24/2013, 1/12/2013, 8/24/2012, 8/23/2012, 8/22/2012, 8/15/2012, 4/21/2012, 4/20/2012, 4/19/2012, 9/17/2011, 4/3/2011, 4/2/2011, 3/31/2011, 9/20/2010, 9/19/2010, 9/18/2010, 8/28/2010, 7/20/2010, 1/30/2007    PAP SMEAR 7/22/2019 7/22/2016 (Postponed), 2/19/2013 (N/S)    Override on 7/22/2016: Postponed (per pt was told could \"skip\" 2016)    Override on 2/19/2013: (N/S) ([place])    IMM DTaP/Tdap/Td Vaccine (7 - Td) 8/6/2022 8/6/2012, 9/18/2010, 3/3/1994, 5/17/1991, 5/10/1990, 3/23/1990, 1990    COLONOSCOPY 9/25/2023 9/25/2013            Current Immunizations     Dtap Vaccine 3/3/1994, 5/17/1991, 5/10/1990, 3/23/1990, 1990    HIB Vaccine(PEDVAX) 1/11/1991    HPV Quadrivalent Vaccine (GARDASIL) 10/27/2011, 5/27/2011, 3/1/2011    Hepatitis A Vaccine, Adult 7/18/2017  2:15 PM    Hepatitis B Vaccine Non-Recombivax (Ped/Adol) 1/28/2004, 10/28/2003, 10/29/1999    Influenza TIV (IM) 9/5/2013, 12/7/2011, 11/7/2011    Influenza Vaccine Adult HD 10/5/2016    MMR Vaccine 3/3/1994, 1/11/1991    OPV - Historical Data 3/3/1994, 5/17/1991, 5/10/1990, 3/23/1990, 1990    Pneumococcal Vaccine (PCV7) Historical Data 11/8/2015    Pneumococcal polysaccharide vaccine (PPSV-23) 1/23/2017  5:35 PM, 1/14/2017    TD Vaccine 9/18/2010  4:45 PM    Tdap Vaccine 8/6/2012      Below and/or attached are the medications your provider expects you to take. Review all of your home medications and newly ordered medications with your provider and/or " pharmacist. Follow medication instructions as directed by your provider and/or pharmacist. Please keep your medication list with you and share with your provider. Update the information when medications are discontinued, doses are changed, or new medications (including over-the-counter products) are added; and carry medication information at all times in the event of emergency situations     Allergies:  CEFDINIR - Shortness of Breath,Itching     DEPAKOTE - Unspecified     ABILIFY - Unspecified     AMITRIPTYLINE - Unspecified     AMOXICILLIN - Rash     CIPROFLOXACIN - Rash     CLINDAMYCIN - Nausea     DOXYCYCLINE - Vomiting,Nausea     EES - Vomiting,Nausea     FLAGYL - Unspecified     FLOMAX - Swelling     METFORMIN - Unspecified     SULFA DRUGS - Hives,Rash     TAPE - Rash     VANCOMYCIN - Itching     CEPHALEXIN - Rash     ERYTHROMYCIN - (reactions not documented)     LEVOFLOXACIN - Unspecified     METRONIDAZOLE - (reactions not documented)     VALPROIC ACID - (reactions not documented)               Medications  Valid as of: July 19, 2017 -  7:24 AM    Generic Name Brand Name Tablet Size Instructions for use    Albuterol Sulfate (Aero Soln) albuterol 108 (90 BASE) MCG/ACT Inhale 2 Puffs by mouth every 6 hours as needed for Shortness of Breath.        Aspirin (Tablet Delayed Response) ECOTRIN 81 MG Take 1 Tab by mouth every day.        Baclofen (Tab) LIORESAL 10 MG Take 1 Tab by mouth 3 times a day.        Cefdinir (Cap) OMNICEF 300 MG Take 300 mg by mouth 2 times a day. Indications: Acute Infection of the Middle Ear        Cranberry (Tab) Cranberry 400 MG Take 2 Tabs by mouth every day.        Cyanocobalamin (Tab) vitamin b12 500 MCG Take 1,000 mcg by mouth 2 times a day.        Ergocalciferol (Cap) DRISDOL 59752 UNITS Take 50,000 Units by mouth every Friday.        FentaNYL (PATCH 72 HR) DURAGESIC 25 MCG/HR Apply 1 Patch to skin as directed every 72 hours.        FLUoxetine HCl (Cap) PROZAC 10 MG TAKE ONE CAPSULE  BY MOUTH ONCE A DAY        Furosemide (Tab) LASIX 40 MG Take 40 mg by mouth every day.        Gabapentin (Cap) NEURONTIN 300 MG Take 600 mg by mouth 3 times a day.        Gabapentin (Once-Daily) (Tab) GRALISE 600 MG Take 600 mg by mouth.        Insulin Pen Needle (Misc) BD PEN NEEDLE MALLY U/F 32G X 4 MM         Ivabradine HCl (Tab) CORLANOR 5 MG Take 1 Tab by mouth 2 times a day, with meals.        Lactobacillus (Pack) LACTINEX/FLORANEX  Take 1 Packet by mouth 3 times a day, with meals.        Lactulose (Solution) lactulose 10 GM/15ML Take 10 g by mouth 2 times a day as needed.        Levothyroxine Sodium (Tab) SYNTHROID 75 MCG Take 75 mcg by mouth Every morning on an empty stomach.        Melatonin (Tab) Melatonin 5 MG Take 10 mg by mouth every bedtime.        Mirabegron (TABLET SR 24 HR) Mirabegron ER 25 MG Take 1 Tab by mouth every day.        Nitrofurantoin Monohyd Macro (Cap) MACROBID 100 MG Take 1 Cap by mouth 2 times a day, with meals.        Nitroglycerin (SL Tab) NITROSTAT 0.3 MG PLACE 1 TABLET UNDER TONGUE IF NEEDED FOR CHEST PAIN EVERY 5 MINUTES FOR 3 DOSES AS DIRECTED        non-formulary med non-formulary med  Inhale 2 L/min by mouth Continuous. OXYGEN  Indications: TONYA        Oxycodone-Acetaminophen (Tab) PERCOCET-10  MG Take 1-2 Tabs by mouth every 6 hours as needed for Severe Pain. Scheduled.        Promethazine HCl (Tab) PHENERGAN 25 MG Take 1 Tab by mouth every 6 hours as needed for Nausea/Vomiting.        SITagliptin Phosphate (Tab) JANUVIA 100 MG Take 1 Tab by mouth every day.        Sodium Bicarbonate (Tab) sodium bicarbonate 325 MG Take 2 Tabs by mouth 3 times a day.        TraMADol HCl (Tab) ULTRAM 50 MG         Ziprasidone HCl (Cap) GEODON 80 MG TAKE 2 CAPSULES BY MOUTH NIGHTLY AT BEDTIME        .                 Medicines prescribed today were sent to:     [id number] - [place], [place] - [address]    [address] [place] [address]    Phone: [phone] Fax:  [phone]    Open 24 Hours?: No      Medication refill instructions:       If your prescription bottle indicates you have medication refills left, it is not necessary to call your provider’s office. Please contact your pharmacy and they will refill your medication.    If your prescription bottle indicates you do not have any refills left, you may request refills at any time through one of the following ways: The online [employer] system (except Urgent Care), by calling your provider’s office, or by asking your pharmacy to contact your provider’s office with a refill request. Medication refills are processed only during regular business hours and may not be available until the next business day. Your provider may request additional information or to have a follow-up visit with you prior to refilling your medication.   *Please Note: Medication refills are assigned a new Rx number when refilled electronically. Your pharmacy may indicate that no refills were authorized even though a new prescription for the same medication is available at the pharmacy. Please request the medicine by name with the pharmacy before contacting your provider for a refill.        Other Notes About Your Plan     DME:  [facility] / ph [phone] / fax [phone]              [employer] Access Code: Activation code not generated  Current [employer] Status: Active

## 2017-07-20 ENCOUNTER — HOME CARE VISIT (OUTPATIENT)
Dept: HOME HEALTH SERVICES | Facility: HOME HEALTHCARE | Age: 27
End: 2017-07-20
Payer: MEDICARE

## 2017-07-20 ENCOUNTER — PATIENT OUTREACH (OUTPATIENT)
Dept: HEALTH INFORMATION MANAGEMENT | Facility: OTHER | Age: 27
End: 2017-07-20

## 2017-07-20 VITALS
TEMPERATURE: 97.5 F | SYSTOLIC BLOOD PRESSURE: 142 MMHG | RESPIRATION RATE: 16 BRPM | HEART RATE: 74 BPM | DIASTOLIC BLOOD PRESSURE: 80 MMHG

## 2017-07-20 PROCEDURE — 665998 HH PPS REVENUE CREDIT

## 2017-07-20 PROCEDURE — 665999 HH PPS REVENUE DEBIT

## 2017-07-20 PROCEDURE — G0151 HHCP-SERV OF PT,EA 15 MIN: HCPCS

## 2017-07-20 ASSESSMENT — GAIT ASSESSMENTS
BALANCE AND GAIT SCORE: 23
PATH: 1
RIGHT STEP CLEAR: 1
INITIATION OF GAIT IMMEDIATELY AFTER GO: 1
TRUNK: 1
STEP CONTINUITY: 1
RIGHT STEP PASS: 1
GAIT SCORE: 9
STEP SYMMETRY: 1
LEFT STEP PASS: 1
LEFT STEP CLEAR: 1
SURVEY COMPLETE: TRUE
TIME: 0

## 2017-07-20 ASSESSMENT — BALANCE ASSESSMENTS
TURNING 360 DEGREES STEADINESS: 1
IMMEDIATE STANDING BALANCE FIRST 5 SECONDS: 2
ARISES: 2
SITTING DOWN: 2
NUDGED: 2
BALANCE SCORE: 14
STANDING BALANCE: 1
EYES CLOSED AT MAXIMUM POSITION NUDGED: 0
SURVEY COMPLETE: TRUE
TURNING 360 DEGREES STEPS: 1
SITTING BALANCE: 1
ATTEMPTS TO ARISE: 2

## 2017-07-20 ASSESSMENT — ENCOUNTER SYMPTOMS
POOR JUDGMENT: 1
THOUGHT CONTENT - SUSPICIOUS: 1

## 2017-07-20 NOTE — ED NOTES
Pt medicated per MAR. Pt upset that she is not getting IV Valium. Pt informed that IV is not available.

## 2017-07-20 NOTE — ED NOTES
Discharge instructions given to patient, a verbal understanding of all instructions was stated. IV removed, cathlon intact, site without s/s of infection. Pt preferred to be wheelchaired out by mother. VSS, all belongings accounted for.

## 2017-07-20 NOTE — ED NOTES
Pt bib EMS with c/o not knowing if she overdosed on her medications or not. Pt reports not remembering if she took 2 doses today or just her usual dose. EMS reports pt dystonic on arrival. Pt received 50mg Benadryl IM en route. Pt able to speak upon arrival with main complaint being cramping in BLE. Pt seen in Dr office and by home health RN this AM. Pre arrival VS: 134/80, 76, 18, 96%RA, BG-104.

## 2017-07-20 NOTE — PROGRESS NOTES
· Placed discharge outreach phone call to patient s/p ER discharge 7/19/17.  Left voicemail providing my contact information and instructions to call with any questions or concerns.

## 2017-07-20 NOTE — ED NOTES
Labs drawn and sent to lab. Pt. States no additional needs at this time. Will continue to monitor.

## 2017-07-20 NOTE — ED NOTES
Pt. Updated on the plan of care for labs to be resulted. Pt. States no additional needs at this time. Will continue to monitor.

## 2017-07-20 NOTE — ED PROVIDER NOTES
"ED Provider Note    CHIEF COMPLAINT  Chief Complaint   Patient presents with   • Drug Overdose     uncertain if she took more than she should have taken       HPI  Kristin Balderrama is a 26 y.o. female with multiple medical problems including multiple personality disorder and history of psychosis, presents to the emergency department by ambulance with her grandmother for altered mental status. Patient is concerned that she may have \"overdosed\" on her medications this afternoon. Patient states she may have taken twice as many of her afternoon medications, presumed ingestion at 3 PM. However, patient is unsure which medications she may take an extra of, because she takes so many. Patient states she was \"fuzzy.\" She denies any intentionality, suicidal ideation or hallucinations. Patient states that shortly after possibly taking these medications she became anxious, her extremity started cramping and her jaw locked. EMS was called. When EMS arrived they gave her 50 mg of Benadryl intramuscularly, her jaw loosened and she was able to speak again. However, she complains of persistent leg cramping and discomfort. No chest pain or palpitations. No nausea or vomiting. No headache, altered mental status. At baseline per family member at this time.    REVIEW OF SYSTEMS  See HPI for further details. All other systems are negative.     PAST MEDICAL HISTORY   has a past medical history of Scoliosis; Multiple personality disorder; Chronic UTI (9/18/2010); Heart burn; Pain (08-15-12); History of falling; Sinus tachycardia (10/31/2013); Urinary incontinence; Hypertension; Disorder of thyroid; Obesity; Pneumonia (2012); ASTHMA; Breath shortness; Anginal syndrome; Psychosis; Arthritis; PCOS (polycystic ovarian syndrome); Gynecological disorder; Renal disorder; Arrhythmia; Urinary bladder disorder; Tuberculosis; Sleep apnea; Mitochondrial disease; and Fall.    SOCIAL HISTORY  Social History     Social History Main Topics   • Smoking " status: Never Smoker    • Smokeless tobacco: Never Used   • Alcohol Use: No   • Drug Use: No   • Sexual Activity: Not Currently     Birth Control/ Protection: Pill       SURGICAL HISTORY   has past surgical history that includes neuro dest facet l/s w/ig sngl (4/21/2015); recovery (1/27/2016); [place] by laparoscopy (8/29/2010); lumbar fusion anterior (8/21/2012); other cardiac surgery (1/2016); tonsillectomy; bowel stimulator insertion (7/13/2016); gastroscopy with balloon dilatation (N/A, 1/4/2017); muscle biopsy (Right, 1/26/2017); and other abdominal surgery.    CURRENT MEDICATIONS  Home Medications     Reviewed by Maurice Burgos R.N. (Registered Nurse) on 07/19/17 at 1712  Med List Status: Partial    Medication Last Dose Status    albuterol 108 (90 BASE) MCG/ACT Aero Soln inhalation aerosol  Active    aspirin EC (ECOTRIN) 81 MG Tablet Delayed Response  Active    baclofen (LIORESAL) 10 MG Tab  Active    BD PEN NEEDLE MALLY U/F 32G X 4 MM Misc  Active    cefdinir (OMNICEF) 300 MG Cap  Active    Cranberry 400 MG Tab  Active    cyanocobalamin (HM VITAMIN B12) 500 MCG tablet  Active    fentanyl (DURAGESIC) 25 MCG/HR PATCH 72 HR  Active    fluoxetine (PROZAC) 10 MG Cap  Active    furosemide (LASIX) 40 MG Tab  Active    gabapentin (NEURONTIN) 300 MG Cap  Active    GRALISE 600 MG Tab  Active    ivabradine (CORLANOR) 5 MG Tab tablet  Active    lactobacillus granules (LACTINEX/FLORANEX) Pack  Active    lactulose 10 GM/15ML Solution  Active    levothyroxine (SYNTHROID) 75 MCG Tab  Active    Melatonin 5 MG Tab  Active    Mirabegron ER (MYRBETRIQ) 25 MG TABLET SR 24 HR  Active    nitrofurantoin monohydr macro (MACROBID) 100 MG Cap  Active    nitroGLYCERIN (NITROSTAT) 0.3 MG SL tablet  Active    non-formulary med  Active    oxycodone-acetaminophen (PERCOCET-10)  MG Tab  Active    promethazine (PHENERGAN) 25 MG Tab  Active    sitagliptin (JANUVIA) 100 MG Tab  Active    sodium bicarbonate 325 MG Tab  Active    tramadol  "(ULTRAM) 50 MG Tab  Active    vitamin D, Ergocalciferol, (DRISDOL) 35686 UNITS Cap capsule  Active    ziprasidone (GEODON) 80 MG Cap  Active                ALLERGIES  Allergies   Allergen Reactions   • Cefdinir Shortness of Breath and Itching     Tolerated 1/18/17   • Depakote [Divalproex Sodium] Unspecified     Muscle spasms/muscle pain and weakness     • Abilify Unspecified     Headaches/muscle twitching     • Amitriptyline Unspecified     Headaches     • Amoxicillin Rash     Pt states \"I get a rash\".     • Ciprofloxacin Rash     Pt states \"I get a rash\".     • Clindamycin Nausea     Even with food     • Doxycycline Vomiting and Nausea     RXN=unknown   • Ees [Erythromycin] Vomiting and Nausea   • Flagyl [Metronidazole Hcl] Unspecified     \"eye problems\"     • Flomax [Tamsulosin Hydrochloride] Swelling   • Metformin Unspecified     Increased lactic acid      • Sulfa Drugs Hives and Rash     RXN=since childhood   • Tape Rash     Tears skin off   coban with Tegaderm tape ok  RXN=ongoing   • Vancomycin Itching     Pt becomes flushed in face and chest.   RXN=7/10/16   • Cephalexin [Keflex] Rash     Pt states she gets a rash with this medication   • Erythromycin    • Levofloxacin Unspecified     Leg muscle cramps   • Metronidazole    • Valproic Acid        PHYSICAL EXAM  VITAL SIGNS: /76 mmHg  Pulse 61  Temp(Src) 36.1 °C (97 °F)  Resp 18  Ht 1.575 m (5' 2.01\")  Wt 117.935 kg (260 lb)  BMI 47.54 kg/m2  SpO2 94%  Pulse ox interpretation: I interpret this pulse ox as normal.  Constitutional: Alert in no apparent distress. Odd affect. Morbidly obese.  HENT: Normocephalic, atraumatic. Bilateral external ears normal, Nose normal. Moist mucous membranes.    Eyes: Pupils are equal and reactive, Conjunctiva normal.   Neck: Normal range of motion, Supple.  Cardiovascular: Regular rate and rhythm, no murmurs. Distal pulses intact.  No peripheral edema.  Thorax & Lungs: Normal breath sounds.  No wheezing/rales/ronchi. " No increased work of breathing.   Abdomen: Soft, non-distended, non-tender to palpation.   Skin: Warm, Dry, No erythema, No rash.   Musculoskeletal: Good range of motion in all major joints. No major deformities noted. Lower extremities contracted, knees and ankles flexed.  Neurologic: Alert , no gross focal deficit noted.  Psychiatric: Odd affect. Anxious.      DIAGNOSTIC STUDIES / PROCEDURES    EKG  I interpreted this EKG myself.  This is a 12-lead study.  The rhythm is sinus with a rate of 68.  There are no ST segment abnormalities.  Diffuse T-wave flattening. Normal intervals. Interpretation: No ST segment elevation myocardial infarction. No ectopy. No arrhythmia.  No change from previous EKG 4/12/17.    LABS  Results for orders placed or performed during the hospital encounter of 07/19/17   Blood Alcohol   Result Value Ref Range    Diagnostic Alcohol 0.00 0.00 g/dL   CBC WITH DIFFERENTIAL   Result Value Ref Range    WBC 7.6 4.8 - 10.8 K/uL    RBC 4.31 4.20 - 5.40 M/uL    Hemoglobin 13.0 12.0 - 16.0 g/dL    Hematocrit 38.0 37.0 - 47.0 %    MCV 88.2 81.4 - 97.8 fL    MCH 30.2 27.0 - 33.0 pg    MCHC 34.2 33.6 - 35.0 g/dL    RDW 41.0 35.9 - 50.0 fL    Platelet Count 278 164 - 446 K/uL    MPV 11.7 9.0 - 12.9 fL    Neutrophils-Polys 60.90 44.00 - 72.00 %    Lymphocytes 29.70 22.00 - 41.00 %    Monocytes 6.60 0.00 - 13.40 %    Eosinophils 2.00 0.00 - 6.90 %    Basophils 0.50 0.00 - 1.80 %    Immature Granulocytes 0.30 0.00 - 0.90 %    Nucleated RBC 0.00 /100 WBC    Neutrophils (Absolute) 4.60 2.00 - 7.15 K/uL    Lymphs (Absolute) 2.24 1.00 - 4.80 K/uL    Monos (Absolute) 0.50 0.00 - 0.85 K/uL    Eos (Absolute) 0.15 0.00 - 0.51 K/uL    Baso (Absolute) 0.04 0.00 - 0.12 K/uL    Immature Granulocytes (abs) 0.02 0.00 - 0.11 K/uL    NRBC (Absolute) 0.00 K/uL   COMP METABOLIC PANEL   Result Value Ref Range    Sodium 136 135 - 145 mmol/L    Potassium 4.5 3.6 - 5.5 mmol/L    Chloride 104 96 - 112 mmol/L    Co2 21 20 - 33  mmol/L    Anion Gap 11.0 0.0 - 11.9    Glucose 103 (H) 65 - 99 mg/dL    Bun 10 8 - 22 mg/dL    Creatinine 0.74 0.50 - 1.40 mg/dL    Calcium 9.7 8.5 - 10.5 mg/dL    AST(SGOT) 30 12 - 45 U/L    ALT(SGPT) 30 2 - 50 U/L    Alkaline Phosphatase 70 30 - 99 U/L    Total Bilirubin 0.4 0.1 - 1.5 mg/dL    Albumin 4.0 3.2 - 4.9 g/dL    Total Protein 6.6 6.0 - 8.2 g/dL    Globulin 2.6 1.9 - 3.5 g/dL    A-G Ratio 1.5 g/dL   HCG QUAL SERUM   Result Value Ref Range    Beta-Hcg Qualitative Serum Negative Negative   Acetaminophen Level   Result Value Ref Range    Acetaminophen -Tylenol <10 10 - 30 ug/mL   SALICYLATE LEVEL   Result Value Ref Range    Salicylates, Quant. 0 (L) 15 - 25 mg/dL   ESTIMATED GFR   Result Value Ref Range    GFR If African American >60 >60 mL/min/1.73 m 2    GFR If Non African American >60 >60 mL/min/1.73 m 2   EKG (NOW)   Result Value Ref Range    Report       [facility] Emergency Dept.    Test Date:  2017  Pt Name:    HARLEY DE LA CRUZ              Department: ER  MRN:        [id number]                      Room:       [facility]  Gender:     F                            Technician: [id number]  :        1990                   Requested By:LUANA ALAMO  Order #:    [id number]                    Reading MD:    Measurements  Intervals                                Axis  Rate:       68                           P:          32  GA:         148                          QRS:        16  QRSD:       94                           T:          -10  QT:         440  QTc:        469    Interpretive Statements  SINUS RHYTHM  NONSPECIFIC T ABNORMALITIES, INFERIOR LEADS  BASELINE WANDER IN LEAD(S) II  Compared to ECG 2017 15:39:50  T-wave abnormality now present       *Note: Due to a large number of results and/or encounters for the requested time period, some results have not been displayed. A complete set of results can be found in Results Review.       COURSE & MEDICAL DECISION  "MAKING  Nursing notes and vital signs were reviewed. (See chart for details)  The patients records were reviewed, history was obtained from the patient;     ED evaluation for possible drug overdose, prescription, nonintentional, is unrevealing. Per EMS patient had clinical evidence for dystonic reaction, she had 50 mg of Benadryl intramuscularly prior to arrival with some improvement in her \"locked jaw,\" leg cramps resolved after Valium in the emergency department. Labs are unremarkable for hours after noted possible ingestion. EKG is unremarkable, no long QT or widened QRS. Continuous telemetry without arrhythmia. Patient resting comfortable, easily arousable, asymptomatic on reevaluation. Vital signs are stable without fever or tachycardia, blood pressure is well-controlled. Patient is tolerating oral fluids and endplates independently.    Patient is stable for discharge at this time, anticipatory guidance provided, continue home medications as recommended, close follow-up is encouraged, and strict ED return instructions have been detailed. Patient and her grandmother are agreeable to the disposition and plan.    Patient's blood pressure was elevated in the emergency department, and has been referred to primary care for close monitoring.    FINAL IMPRESSION  (T50.901A) Overdose, accidental or unintentional, initial encounter, POSSIBLE  (primary encounter diagnosis)      Electronically signed by: Junie Miranda, 7/19/2017 6:30 PM      This dictation was created using voice recognition software. The accuracy of the dictation is limited to the abilities of the software. I expect there may be some errors of grammar and possibly content. The nursing notes were reviewed and certain aspects of this information were incorporated into this note.          "

## 2017-07-20 NOTE — DISCHARGE INSTRUCTIONS
"Follow-up with primary care and psychiatry this week for reevaluation, medication management and close blood pressure monitoring.    Allowing her grandmother to help you manage medications, use pill organizer for ease of dosing.    Continue home medications as previously indicated.    Return to the emergency department for altered mental status, vomiting, palpitations, syncope, cramping, suicidal or homicidal ideation or other new concerns.    Overdose, Adult  A person can overdose on alcohol, drugs or both by accident or on purpose. If it was on purpose, it is a serious matter. Professional help should be sought. If the overdose was an accident, certain steps should be taken to make sure that it never happens again.  ACCIDENTAL OVERDOSE  Overdosing on prescription medications can be a result of:  · Not understanding the instructions.  · Misreading the label.  · Forgetting that you took a dose and then taking another by mistake. This situation happens a lot.  To make sure this does not happen again:  · Clarify the correct dosage with your caregiver.  · Place the correct dosage in a \"pill-minder\" container (labeled for each day and time of day).  · Have someone dispense your medicine.  Please be sure to follow-up with your primary care doctor as directed.  INTENTIONAL OVERDOSE  If the overdose was on purpose, it is a serious situation. Taking more than the prescribed amount of medications (including taking someone else's prescription), abusing street drugs or drinking an amount of alcohol that requires medical treatment can show a variety of possible problems. These may indicate you:  · Are depressed or suicidal.  · Are abusing drugs, took too much or combined different drugs to experiment with the effects.  · Mixed alcohol with drugs and did not realize the danger of doing so (this is drug abuse).  · Are suffering addiction to drugs and/or alcohol (also known as chemical dependency).  · Binge drink.  If you have not " "been referred to a mental health professional for help, it is important that you get help right away. Only a professional can determine which problems may exist and what the best course of treatment may be. It is your responsibility to follow-up with further evaluation or treatment as directed.   Alcohol is responsible for a large number of overdoses and unintended deaths among college-age young adults. Binge drinking is consuming 4-5 drinks in a short period of time. The amount of alcohol in standard servings of wine (5 oz.), beer (12 oz.) and distilled spirits (1.5 oz., 80 proof) is the same. Beer or wine can be just as dangerous to the binge drinker as \"hard\" liquor can be.   CONSEQUENCES OF BINGE DRINKING  Alcohol poisoning is the most serious consequence of binge drinking. This is a severe and potentially fatal physical reaction to an alcohol overdose. When too much alcohol is consumed, the brain does not get enough oxygen. The lack of oxygen will eventually cause the brain to shut down the voluntary functions that regulate breathing and heart rate. Symptoms of alcohol poisoning include:  · Vomiting.  · Passing out (unconsciousness).  · Cold, clammy, pale or bluish skin.  · Slow or irregular breathing.  WHAT SHOULD I DO NEXT?  If you have a history of drug abuse or suffer chemical dependency (alcoholism, drug addiction or both), you might consider the following:  · Talk with a qualified substance abuse counselor and consider entering a treatment program.  · Go to a detox facility if necessary.  · If you were attending self-help group meetings, consider returning to them and go often.  · Explore other resources located near you (see sources listed below).  If you are unsure if you have a substance abuse problem, ask yourself the following questions:  · Have you been told by friends or family that drugs/alcohol has become a problem?  · Do you get into fights when drinking or using drugs?  · Do you have blackouts " "(not remembering what you do while using)?  · Do you lie about use or amounts of drugs or alcohol you consume?  · Do you need chemicals to get you going?  · Do you suffer in work or school performance because of drug or alcohol use?  · Do you get sick from drug or alcohol use but continue to use anyway?  · Do you need drugs or alcohol to relate to people or feel comfortable in social situations?  · Do you use drugs or alcohol to forget problems?  If you answered \"Yes\" to any of the above questions, it means you show signs of chemical dependency and a professional evaluation is suggested. The longer the use of drugs and alcohol continues, the problems will become greater.  SEEK IMMEDIATE MEDICAL CARE IF:   · You feel like you might repeat your problematic behavior.  · You need someone to talk to and feel that it should not wait.  · You feel you are a danger to yourself or someone else.  · You feel like you are having a new reaction to medications you are taking, or you are getting worse after leaving a care center.  · You have an overwhelming urge to drink or use drugs.  Addiction cannot be cured, but it can be treated successfully. Treatment centers are listed in the yellow pages under: Cocaine, Narcotics, and Alcoholics Anonymous. Most hospitals and clinics can refer you to a specialized care center. The [place] government maintains a toll-free number for obtaining treatment referrals: 1-776.391.5626 or 1-906.519.2277 (TDD) and maintains a website: http://findtreatment.samhsa.gov. Other websites for additional information are: www.mentalhealth.samhsa.gov. and www.[place].gov.  In [place] treatment resources are listed in each Province with listings available under [employer] or similar titles.  Document Released: 12/20/2004 Document Revised: 03/11/2013 Document Reviewed: 11/11/2009  ExitCare® Patient Information ©2014 [employer]    Nontoxic Ingestion  Nontoxic ingestion means that the substance you " have swallowed (ingested) is not likely to cause serious medical problems. Further treatment is not needed at this time. However, the effects of drugs or other substances can sometimes be delayed, so you should watch your condition for any changes.  HOME CARE INSTRUCTIONS  · Take medicines only as directed by your health care provider.  · Follow instructions from your health care provider about eating or drinking restrictions. If you have vomited since ingesting the substance, you may need to avoid eating or drinking for a few hours. After that, you can start with small sips of clear liquids until your stomach settles.  · Do not drink alcohol or use illegal drugs.  · Keep all follow-up visits as directed by your health care provider. This is important.  SEEK MEDICAL CARE IF:  · You have a fever.  · You have vomiting.  SEEK IMMEDIATE MEDICAL CARE IF:  · You have difficulty walking.  · You have confusion or agitation.  · You are overly tired.  · You have difficulty breathing.  · You have difficulty swallowing or you have a lot of mucus.  · You have a seizure.  · You have profuse sweating.  · You have a cough.  · You have abdominal pain, repeated vomiting, or severe diarrhea.  · You have weakness.  · You have a fast or irregular heartbeat (palpitations).  · You have signs of dehydration, such as:  ¨ Severe thirst.  ¨ Dry lips and mouth.  ¨ Dizziness.  ¨ Dark urine or a decreasing need to urinate.  ¨ Rapid breathing or rapid pulse.     This information is not intended to replace advice given to you by your health care provider. Make sure you discuss any questions you have with your health care provider.     Document Released: 01/25/2006 Document Revised: 05/03/2016 Document Reviewed: 11/11/2015  [employer] Interactive Patient Education ©2016 [employer]

## 2017-07-20 NOTE — ED NOTES
Pt. And family updated on the plan of care for ERP to review lab results. Pt. And family state no additional needs at this time. Will continue to monitor.

## 2017-07-21 ENCOUNTER — HOME CARE VISIT (OUTPATIENT)
Dept: HOME HEALTH SERVICES | Facility: HOME HEALTHCARE | Age: 27
End: 2017-07-21
Payer: MEDICARE

## 2017-07-21 VITALS
HEART RATE: 78 BPM | TEMPERATURE: 96.5 F | DIASTOLIC BLOOD PRESSURE: 70 MMHG | RESPIRATION RATE: 18 BRPM | SYSTOLIC BLOOD PRESSURE: 128 MMHG

## 2017-07-21 LAB
BACTERIA UR CULT: ABNORMAL
BACTERIA UR CULT: ABNORMAL
SIGNIFICANT IND 70042: ABNORMAL
SOURCE SOURCE: ABNORMAL

## 2017-07-21 PROCEDURE — G0180 MD CERTIFICATION HHA PATIENT: HCPCS | Performed by: INTERNAL MEDICINE

## 2017-07-21 PROCEDURE — 665999 HH PPS REVENUE DEBIT

## 2017-07-21 PROCEDURE — 665998 HH PPS REVENUE CREDIT

## 2017-07-21 PROCEDURE — G0299 HHS/HOSPICE OF RN EA 15 MIN: HCPCS

## 2017-07-21 PROCEDURE — G0156 HHCP-SVS OF AIDE,EA 15 MIN: HCPCS

## 2017-07-21 ASSESSMENT — PATIENT HEALTH QUESTIONNAIRE - PHQ9: 2. FEELING DOWN, DEPRESSED, IRRITABLE, OR HOPELESS: 01

## 2017-07-22 ENCOUNTER — HOME CARE VISIT (OUTPATIENT)
Dept: HOME HEALTH SERVICES | Facility: HOME HEALTHCARE | Age: 27
End: 2017-07-22
Payer: MEDICARE

## 2017-07-22 VITALS
DIASTOLIC BLOOD PRESSURE: 70 MMHG | RESPIRATION RATE: 18 BRPM | SYSTOLIC BLOOD PRESSURE: 128 MMHG | HEART RATE: 78 BPM | TEMPERATURE: 96.5 F

## 2017-07-22 PROCEDURE — 665999 HH PPS REVENUE DEBIT

## 2017-07-22 PROCEDURE — 665998 HH PPS REVENUE CREDIT

## 2017-07-22 SDOH — ECONOMIC STABILITY: HOUSING INSECURITY: UNSAFE COOKING RANGE AREA: 0

## 2017-07-22 SDOH — ECONOMIC STABILITY: HOUSING INSECURITY: UNSAFE APPLIANCES: 0

## 2017-07-22 ASSESSMENT — ENCOUNTER SYMPTOMS: RESPIRATORY SYMPTOMS COMMENTS: REPORTS SOB WITH EXERTION AT TIMES

## 2017-07-23 ENCOUNTER — HOSPITAL ENCOUNTER (OUTPATIENT)
Facility: MEDICAL CENTER | Age: 27
End: 2017-07-23
Attending: EMERGENCY MEDICINE
Payer: MEDICARE

## 2017-07-23 ENCOUNTER — OFFICE VISIT (OUTPATIENT)
Dept: URGENT CARE | Facility: CLINIC | Age: 27
End: 2017-07-23
Payer: MEDICARE

## 2017-07-23 VITALS
HEIGHT: 63 IN | SYSTOLIC BLOOD PRESSURE: 104 MMHG | HEART RATE: 100 BPM | TEMPERATURE: 98.4 F | OXYGEN SATURATION: 93 % | BODY MASS INDEX: 46.07 KG/M2 | WEIGHT: 260 LBS | DIASTOLIC BLOOD PRESSURE: 70 MMHG | RESPIRATION RATE: 16 BRPM

## 2017-07-23 DIAGNOSIS — N61.0 CELLULITIS OF BREAST: ICD-10-CM

## 2017-07-23 LAB
GLUCOSE BLD-MCNC: 106 MG/DL (ref 70–100)
GRAM STN SPEC: NORMAL
SIGNIFICANT IND 70042: NORMAL
SITE SITE: NORMAL
SOURCE SOURCE: NORMAL

## 2017-07-23 PROCEDURE — 90715 TDAP VACCINE 7 YRS/> IM: CPT | Performed by: EMERGENCY MEDICINE

## 2017-07-23 PROCEDURE — 87070 CULTURE OTHR SPECIMN AEROBIC: CPT

## 2017-07-23 PROCEDURE — 90471 IMMUNIZATION ADMIN: CPT | Performed by: EMERGENCY MEDICINE

## 2017-07-23 PROCEDURE — 99213 OFFICE O/P EST LOW 20 MIN: CPT | Mod: 25 | Performed by: EMERGENCY MEDICINE

## 2017-07-23 PROCEDURE — 665998 HH PPS REVENUE CREDIT

## 2017-07-23 PROCEDURE — 82962 GLUCOSE BLOOD TEST: CPT | Performed by: EMERGENCY MEDICINE

## 2017-07-23 PROCEDURE — 87205 SMEAR GRAM STAIN: CPT

## 2017-07-23 PROCEDURE — 665999 HH PPS REVENUE DEBIT

## 2017-07-23 RX ORDER — DOXYCYCLINE HYCLATE 100 MG
100 TABLET ORAL 2 TIMES DAILY
Qty: 20 TAB | Refills: 0 | Status: SHIPPED | OUTPATIENT
Start: 2017-07-23 | End: 2017-07-25

## 2017-07-23 ASSESSMENT — ENCOUNTER SYMPTOMS
VOMITING: 0
FATIGUE: 1
NAUSEA: 0
FEVER: 1
SPUTUM PRODUCTION: 0
EYE PAIN: 0
ABDOMINAL PAIN: 0
TINGLING: 0
COUGH: 0
ANOREXIA: 1
MYALGIAS: 0
HEMOPTYSIS: 0
JOINT SWELLING: 0
EYE DISCHARGE: 0
NERVOUS/ANXIOUS: 0
HEADACHES: 0
BACK PAIN: 0
NECK PAIN: 0
BREAST PAIN: 1

## 2017-07-23 NOTE — MR AVS SNAPSHOT
"        Kristin Balderrama   2017 9:15 AM   Office Visit   MRN: [id number]    Department:  [facility]   Dept Phone:  [phone]    Description:  Female : 1990   Provider:  ANTONI Hernandez M.D.           Reason for Visit     Breast Pain Pt states theres a bump on (R) side of breast and getting bigger      Allergies as of 2017     Allergen Noted Reactions    Cefdinir 2016   Shortness of Breath, Itching    Tolerated 17    Depakote [Divalproex Sodium] 2010   Unspecified    Muscle spasms/muscle pain and weakness      Abilify 2013   Unspecified    Headaches/muscle twitching      Amitriptyline 10/31/2013   Unspecified    Headaches      Amoxicillin 2010   Rash    Pt states \"I get a rash\".      Ciprofloxacin 2009   Rash    Pt states \"I get a rash\".      Clindamycin 2011   Nausea    Even with food      Ees [Erythromycin] 2010   Vomiting, Nausea    Flagyl [Metronidazole Hcl] 2011   Unspecified    \"eye problems\"      Flomax [Tamsulosin Hydrochloride] 2009   Swelling    Metformin 2013   Unspecified    Increased lactic acid       Sulfa Drugs 2010   Hives, Rash    RXN=since childhood    Tape 08/15/2012   Rash    Tears skin off   coban with Tegaderm tape ok  RXN=ongoing    Vancomycin 07/10/2016   Itching    Pt becomes flushed in face and chest.   RXN=7/10/16    Cephalexin [Keflex] 2017   Rash    Pt states she gets a rash with this medication    Erythromycin 2017       Levofloxacin 10/27/2016   Unspecified    Leg muscle cramps    Metronidazole 2017       Valproic Acid 2017         You were diagnosed with     Cellulitis of breast   [[id number]]         Vital Signs     Blood Pressure Pulse Temperature Respirations Height Weight    104/70 mmHg 100 36.9 °C (98.4 °F) 16 1.6 m (5' 3\") 117.935 kg (260 lb)    Body Mass Index Oxygen Saturation Smoking Status             46.07 kg/m2 93% Never Smoker          Basic Information  "    Date Of Birth Sex Race Ethnicity Preferred Language    1990 Female White Non- English      Your appointments     Jul 24, 2017 To Be Determined   AIDE-HH-HOME VISIT with Karla Marquez C.N.A.   [facility] (--)    [address]  [place] [address]   [phone]            Jul 25, 2017 To Be Determined   SN-HH-HOME VIS+LPN SUP+HHA SUP with Sinai Hooker R.N.   [facility] (--)    [address]  [place] [address]   [phone]            Jul 26, 2017 To Be Determined   AIDE-HH-HOME VISIT with Karla Marquez C.N.A.   [facility] (--)    [address]  [place] [address]   [phone]            Jul 28, 2017 To Be Determined   SN-HH-HOME VISIT with Joe Ramirez R.N.   [facility] (--)    [address]  [place] [address]   [phone]            Jul 28, 2017 To Be Determined   AIDE-HH-HOME VISIT with Karla Marquez C.N.A.   [facility] (--)    [address]  [place] [address]   [phone]            Jul 31, 2017 To Be Determined   AIDE-HH-HOME VISIT with Karla Marquez C.N.A.   [facility] (--)    [address]  [place] [address]   [phone]            Aug 01, 2017 To Be Determined   SN-HH-HOME VISIT with [facility] TE[facility] (--)    [address]  [place] [address]   [phone]            Aug 02, 2017 To Be Determined   AIDE[facility]HH[facility]HOME VISIT [facility] (--[facility]    [address]  [place] [address]   [phone]            Aug 04, 2017 To Be Determined   AIDE-HH-HOME VISIT with SANDY IrahetaA.   [facility] (--)    [facility] [address] [facility]  [facility] [address]   [phone]            Aug 07, 2017 To Be Determined   AIDE-HH-HOME VISIT with Karla Marquez C.N.A.   [facility] (--)    [address]  [place] [address]   [phone]            Aug 08, 2017 To Be Determined   SN-HH-HOME VISIT with Joe Ramirez R.N.   [facility] (--)     [address]  [place] [address]   [phone]            Aug 09, 2017 To Be Determined   AIDE-HH-HOME VISIT with [facility] Aide   [facility] (--)    [address]  [place] [address]   [phone]            Aug 11, 2017 10:00 AM   Follow Up Visit with Sandoval Fox M.D.   [facility] (--)    [address], [address]  [place] [id number] [phone]           You will be receiving a confirmation call a few days before your appointment from our automated call confirmation system.            Aug 11, 2017 To Be Determined   AIDE-HH-HOME VISIT with Karla Maruqez C.N.A.   [facility] (--)    [address]  [place] [address]   [phone]            Aug 14, 2017 To Be Determined   AIDE-HH-HOME VISIT with Karla Marquez C.N.A.   [facility] (--)    [address]  [place] [address]   [phone]            Aug 15, 2017 To Be Determined   SN-HH-HOME VISIT with Joe Ramirez R.N.   [facility] (--)    [address]  [place] [address]   [phone]            Aug 16, 2017 To Be Determined   AIDE-HH-HOME VISIT with [facility] Aide   [facility] (--)    [address]  [place] [address]   [phone]            Aug 18, 2017 To Be Determined   AIDE-HH-HOME VISIT with Karla Marquez C.N.A.   [facility] (--)    [address]  [place] [address]   [phone]            Aug 21, 2017 To Be Determined   AIDE-HH-HOME VISIT with Karla Marquez C.N.A.   [facility] (--)    [address]  [place] [address]   [phone]            Aug 22, 2017 To Be Determined   SN-HH-HOME VISIT with Joe Ramirez R.N.   [facility] (--)    [address]  [facility] [address]   [phone]            Aug 23, 2017 To Be Determined   AIDE-HH-HOME VISIT with [facility] Aide   [facility] (--)    [address]  [facility] [address]   [phone]            Aug 24, 2017 11:30 AM   Follow Up  Med Management with Ronny Burnette M.D.   [facility] [address] [place] [address])    [address] [facility] [address]  [address]  [place] [address]   [phone]            Aug 25, 2017 To Be Determined   AIDE-HH-HOME VISIT with Karla Marquez C.N.A.   [facility] (--)    [address]  [place] [address]   [phone]            Aug 29, 2017  8:00 AM   Individual Therapy with Blanca Brantley L.C.S.W.   [facility]    [address] [facility]  [address]  [place] [address]   [phone]            Aug 29, 2017 To Be Determined   SN-HH-HOME VISIT with Sinai Hooker R.N.   [facility] (--)    [address] [facility]  [place] [address]   [phone]            Sep 18, 2017  8:00 AM   Individual Therapy with Blanca Brantley L.C.S.W.   [facility]    [address] [facility]  [address]  [place] [address] [phone]            Oct 04, 2017 10:00 AM   Individual Therapy with Blanca Brantley L.C.S.W.   [facility]    [address] [facility]  [address]  [place] [address] [phone]            Oct 06, 2017  7:20 AM   Established Patient with Torres Brody M.D.   [facility] [address]    [address] [facility]  [place] [address]   [phone]           You will be receiving a confirmation call a few days before your appointment from our automated call confirmation system.              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 Low  9/18/2010 - Present    Neurogenic bladder N31.9 Low  4/2/2011 - Present    Sinus tachycardia (Chronic) R00.0 High  10/31/2013 - Present    Knee pain, right M25.561 Low  2/13/2014 - Present    Anxiety F41.9 Low  12/16/2014 - Present    Fatty liver disease, nonalcoholic K76.0 Low  1/19/2015 - Present    Progressive focal motor weakness R53.1 Low  6/28/2015 - Present    Acquired hypothyroidism E03.9 Low   11/23/2015 - Present    PCOS (polycystic ovarian syndrome) E28.2 Low  11/23/2015 - Present    H/O prior ablation treatment Z98.890   2/10/2016 - Present    Peripheral neuropathy (CMS-HCC) (Chronic) G62.9   3/6/2016 - Present    TONYA on CPAP G47.33, Z99.89 Low  3/7/2016 - Present    Morbidly obese (CMS-[facility]) E66.01   3/7/2016 - Present    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   3/7/2016 - Present    Scoliosis M41.9   3/7/2016 - Present    GERD (gastroesophageal reflux disease) (Chronic) K21.9   3/7/2016 - Present    Vitamin D deficiency E55.9   5/21/2016 - Present    Chronic inflammatory arthritis (Chronic) M19.90 Medium  5/23/2016 - Present    Weakness of both lower extremities R29.898   6/22/2016 - Present    Elevated sedimentation rate R70.0   6/27/2016 - Present    Galactorrhea O92.6   7/22/2016 - Present    Psychosis, schizophrenia, simple (HCC) (Chronic) F20.89 Low Chronic 9/29/2016 - Present    Schizophrenia (CMS-HCC) F20.9 Low  10/27/2016 - Present    Chronic pain syndrome (Chronic) G89.4   10/27/2016 - Present    Bowel and bladder incontinence R32, R15.9   10/27/2016 - Present    Depression F32.9 Low  10/28/2016 - Present    HTN (hypertension) (Chronic) I10   11/1/2016 - Present    Hypovitaminosis D E55.9   11/29/2016 - Present    Leg weakness R29.898   1/4/2017 - Present    Weakness R53.1   1/22/2017 - Present    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 High  1/24/2017 - Present    Chronic suprapubic catheter (CMS-HCC) (Chronic) Z93.59   2/16/2017 - Present    Leg weakness, bilateral R29.898   2/22/2017 - Present    Weakness of right upper extremity R29.898   2/23/2017 - Present    Chest pain R07.9   3/30/2017 - Present    On home oxygen therapy (Chronic) Z99.81   4/15/2017 - Present    Dysuria R30.0   5/9/2017 - Present    UTI (urinary tract infection) N39.0   5/13/2017 - Present    Weakness R53.1   5/13/2017 - Present    Enterococcus faecalis infection B95.2   5/13/2017 - Present    Fall at home  W19.XXXA, Y92.099   5/13/2017 - Present    Hashimoto's encephalopathy E06.3, G93.49   5/17/2017 - Present    Rash R21   6/9/2017 - Present    IGT (impaired glucose tolerance) R73.02   7/6/2017 - Present      Health Maintenance        Date Due Completion Dates    IMM VARICELLA (CHICKENPOX) VACCINE (1 of 2 - 2 Dose Adolescent Series) 10/13/2002 ---    IMM INFLUENZA (1) 9/1/2017 10/5/2016, 9/5/2013, 12/7/2011, 11/7/2011    A1C SCREENING 1/6/2018 7/6/2017, 6/28/2017, 4/6/2017, 12/7/2016, 10/14/2016, 3/9/2016, 9/5/2014    IMM HEP A VACCINE (2 of 2 - Standard Series) 1/18/2018 7/18/2017    URINE ACR / MICROALBUMIN 5/5/2018 5/5/2017, 3/7/2017, 12/7/2016, 10/14/2016, 7/28/2016    RETINAL SCREENING 5/23/2018 5/23/2017    DIABETES MONOFILAMENT / LE EXAM 5/23/2018 5/23/2017    FASTING LIPID PROFILE 7/6/2018 7/6/2017, 3/7/2017, 2/24/2017, 12/7/2016, 10/28/2016, 10/14/2016, 3/21/2014    SERUM CREATININE 7/19/2018 7/19/2017, 7/6/2017, 6/21/2017, 5/18/2017, 5/17/2017, 5/14/2017, 5/13/2017, 5/5/2017, 4/14/2017, 4/13/2017, 4/12/2017, 4/5/2017, 3/27/2017, 3/18/2017, 3/17/2017, 3/16/2017, 3/11/2017, 3/10/2017, 3/9/2017, 3/7/2017, 2/25/2017, 2/24/2017, 2/23/2017, 2/22/2017, 1/30/2017, 1/27/2017, 1/25/2017, 1/22/2017, 1/22/2017, 1/18/2017, 1/18/2017, 12/7/2016, 12/6/2016, 11/4/2016, 11/3/2016, 11/2/2016, 11/1/2016, 10/28/2016, 10/27/2016, 10/14/2016, 10/4/2016, 10/3/2016, 9/29/2016, 8/16/2016, 7/28/2016, 7/28/2016, 7/10/2016, 6/25/2016, 6/23/2016, 6/22/2016, 6/7/2016, 5/18/2016, 4/19/2016, 4/3/2016, 3/30/2016, 3/29/2016, 3/28/2016, 3/14/2016, 3/10/2016, 3/9/2016, 3/7/2016, 3/6/2016, 2/15/2016, 2/12/2016, 1/29/2016, 1/28/2016, 1/26/2016, 11/28/2015, 11/27/2015, 11/23/2015, 11/22/2015, 7/9/2015, 7/8/2015, 6/27/2015, 4/14/2015, 3/23/2015, 2/18/2015, 10/27/2014, 9/5/2014, 3/21/2014, 12/24/2013, 1/12/2013, 8/24/2012, 8/23/2012, 8/22/2012, 8/15/2012, 4/21/2012, 4/20/2012, 4/19/2012, 9/17/2011, 4/3/2011, 4/2/2011, 3/31/2011, 9/20/2010,  "9/19/2010, 9/18/2010, 8/28/2010, 7/20/2010, 1/30/2007    PAP SMEAR 7/22/2019 7/22/2016 (Postponed), 2/19/2013 (N/S)    Override on 7/22/2016: Postponed (per pt was told could \"skip\" 2016)    Override on 2/19/2013: (N/S) ([place])    IMM DTaP/Tdap/Td Vaccine (7 - Td) 8/6/2022 8/6/2012, 9/18/2010, 3/3/1994, 5/17/1991, 5/10/1990, 3/23/1990, 1990    COLONOSCOPY 9/25/2023 9/25/2013            Results     POCT Glucose      Component Value Standard Range & Units    Glucose - Accu-Ck 106 70 - 100 mg/dL                        Current Immunizations     Dtap Vaccine 3/3/1994, 5/17/1991, 5/10/1990, 3/23/1990, 1990    HIB Vaccine(PEDVAX) 1/11/1991    HPV Quadrivalent Vaccine (GARDASIL) 10/27/2011, 5/27/2011, 3/1/2011    Hepatitis A Vaccine, Adult 7/18/2017  2:15 PM    Hepatitis B Vaccine Non-Recombivax (Ped/Adol) 1/28/2004, 10/28/2003, 10/29/1999    Influenza TIV (IM) 9/5/2013, 12/7/2011, 11/7/2011    Influenza Vaccine Adult HD 10/5/2016    MMR Vaccine 3/3/1994, 1/11/1991    OPV - Historical Data 3/3/1994, 5/17/1991, 5/10/1990, 3/23/1990, 1990    Pneumococcal Vaccine (PCV7) Historical Data 11/8/2015    Pneumococcal polysaccharide vaccine (PPSV-23) 1/23/2017  5:35 PM, 1/14/2017    TD Vaccine 9/18/2010  4:45 PM    Tdap Vaccine  Incomplete, 8/6/2012      Below and/or attached are the medications your provider expects you to take. Review all of your home medications and newly ordered medications with your provider and/or pharmacist. Follow medication instructions as directed by your provider and/or pharmacist. Please keep your medication list with you and share with your provider. Update the information when medications are discontinued, doses are changed, or new medications (including over-the-counter products) are added; and carry medication information at all times in the event of emergency situations     Allergies:  CEFDINIR - Shortness of Breath,Itching     DEPAKOTE - Unspecified     ABILIFY - Unspecified     " AMITRIPTYLINE - Unspecified     AMOXICILLIN - Rash     CIPROFLOXACIN - Rash     CLINDAMYCIN - Nausea     EES - Vomiting,Nausea     FLAGYL - Unspecified     FLOMAX - Swelling     METFORMIN - Unspecified     SULFA DRUGS - Hives,Rash     TAPE - Rash     VANCOMYCIN - Itching     CEPHALEXIN - Rash     ERYTHROMYCIN - (reactions not documented)     LEVOFLOXACIN - Unspecified     METRONIDAZOLE - (reactions not documented)     VALPROIC ACID - (reactions not documented)               Medications  Valid as of: July 23, 2017 - 10:18 AM    Generic Name Brand Name Tablet Size Instructions for use    Albuterol Sulfate (Aero Soln) albuterol 108 (90 BASE) MCG/ACT Inhale 2 Puffs by mouth every 6 hours as needed for Shortness of Breath.        Aspirin (Tablet Delayed Response) ECOTRIN 81 MG Take 1 Tab by mouth every day.        Baclofen (Tab) LIORESAL 10 MG Take 1 Tab by mouth 3 times a day.        Cefdinir (Cap) OMNICEF 300 MG Take 300 mg by mouth 2 times a day. Indications: Acute Infection of the Middle Ear        Cranberry (Tab) Cranberry 400 MG Take 2 Tabs by mouth every day.        Cyanocobalamin (Tab) vitamin b12 500 MCG Take 1,000 mcg by mouth 2 times a day.        Doxycycline Hyclate (Tab) VIBRAMYCIN 100 MG Take 1 Tab by mouth 2 times a day.        Ergocalciferol (Cap) DRISDOL 21901 UNITS Take 50,000 Units by mouth every Friday.        FentaNYL (PATCH 72 HR) DURAGESIC 25 MCG/HR Apply 1 Patch to skin as directed every 72 hours.        FLUoxetine HCl (Cap) PROZAC 10 MG TAKE ONE CAPSULE BY MOUTH ONCE A DAY        Furosemide (Tab) LASIX 40 MG Take 40 mg by mouth every day.        Gabapentin (Cap) NEURONTIN 300 MG Take 600 mg by mouth 3 times a day.        Gabapentin (Once-Daily) (Tab) GRALISE 600 MG Take 600 mg by mouth.        Insulin Pen Needle (Misc) BD PEN NEEDLE MALLY U/F 32G X 4 MM         Ivabradine HCl (Tab) CORLANOR 5 MG Take 1 Tab by mouth 2 times a day, with meals.        Lactobacillus (Pack) LACTINEX/FLORANEX  Take 1  Packet by mouth 3 times a day, with meals.        Lactulose (Solution) lactulose 10 GM/15ML Take 10 g by mouth 2 times a day as needed.        Levothyroxine Sodium (Tab) SYNTHROID 75 MCG Take 75 mcg by mouth Every morning on an empty stomach.        Melatonin (Tab) Melatonin 5 MG Take 10 mg by mouth every bedtime.        Mirabegron (TABLET SR 24 HR) Mirabegron ER 25 MG Take 1 Tab by mouth every day.        Mupirocin (Ointment) BACTROBAN 2 % Apply 1 Application to affected area(s) 2 times a day.        Nitrofurantoin Monohyd Macro (Cap) MACROBID 100 MG Take 1 Cap by mouth 2 times a day, with meals.        Nitroglycerin (SL Tab) NITROSTAT 0.3 MG PLACE 1 TABLET UNDER TONGUE IF NEEDED FOR CHEST PAIN EVERY 5 MINUTES FOR 3 DOSES AS DIRECTED        non-formulary med non-formulary med  Inhale 2 L/min by mouth Continuous. OXYGEN  Indications: TONYA        Oxycodone-Acetaminophen (Tab) PERCOCET-10  MG Take 1-2 Tabs by mouth every 6 hours as needed for Severe Pain. Scheduled.        Promethazine HCl (Tab) PHENERGAN 25 MG Take 1 Tab by mouth every 6 hours as needed for Nausea/Vomiting.        SITagliptin Phosphate (Tab) JANUVIA 100 MG Take 1 Tab by mouth every day.        Sodium Bicarbonate (Tab) sodium bicarbonate 325 MG Take 2 Tabs by mouth 3 times a day.        TraMADol HCl (Tab) ULTRAM 50 MG         Ziprasidone HCl (Cap) GEODON 80 MG TAKE 2 CAPSULES BY MOUTH NIGHTLY AT BEDTIME        .                 Medicines prescribed today were sent to:     [id number] - [place], [place] - [address] [address]    Phone: [phone] Fax: [phone]    Open 24 Hours?: No      Medication refill instructions:       If your prescription bottle indicates you have medication refills left, it is not necessary to call your provider’s office. Please contact your pharmacy and they will refill your medication.    If your prescription bottle indicates you do not have any refills left, you may request  refills at any time through one of the following ways: The online [employer] system (except Urgent Care), by calling your provider’s office, or by asking your pharmacy to contact your provider’s office with a refill request. Medication refills are processed only during regular business hours and may not be available until the next business day. Your provider may request additional information or to have a follow-up visit with you prior to refilling your medication.   *Please Note: Medication refills are assigned a new Rx number when refilled electronically. Your pharmacy may indicate that no refills were authorized even though a new prescription for the same medication is available at the pharmacy. Please request the medicine by name with the pharmacy before contacting your provider for a refill.        Your To Do List     Future Labs/Procedures Complete By Expires    CULTURE WOUND W/ GRAM STAIN  As directed 7/23/2018      Other Notes About Your Plan     DME:  [facility] / ph [phone] / fax [phone]              [employer] Access Code: Activation code not generated  Current [employer] Status: Active

## 2017-07-23 NOTE — PROGRESS NOTES
"Subjective:      Kristin Balderrama is a 26 y.o. female who presents with No chief complaint on file.            Breast Pain  This is a new problem. Episode onset: patient has a painful right breast x 2 days patient has been picking at it currently and has a dry scab on it. But there has been discharged prior to this urgent care visit. The problem has been gradually worsening. Associated symptoms include anorexia, fatigue, a fever (patient states her normal temperature is 96 degrees.) and a rash (patient has an area of erythema around her right breast medial aspect. See the diagram). Pertinent negatives include no abdominal pain, chest pain, coughing, headaches, joint swelling, myalgias, nausea, neck pain or vomiting. Associated symptoms comments: Patient also complains of cloudy thoughts. I reviewed her problem list which is extensive 121 problems listed. Including schizophrenia diabetes..     Allergies   Allergen Reactions   • Cefdinir Shortness of Breath and Itching     Tolerated 1/18/17   • Depakote [Divalproex Sodium] Unspecified     Muscle spasms/muscle pain and weakness     • Abilify Unspecified     Headaches/muscle twitching     • Amitriptyline Unspecified     Headaches     • Amoxicillin Rash     Pt states \"I get a rash\".     • Ciprofloxacin Rash     Pt states \"I get a rash\".     • Clindamycin Nausea     Even with food     • Doxycycline Vomiting and Nausea     RXN=unknown   • Ees [Erythromycin] Vomiting and Nausea   • Flagyl [Metronidazole Hcl] Unspecified     \"eye problems\"     • Flomax [Tamsulosin Hydrochloride] Swelling   • Metformin Unspecified     Increased lactic acid      • Sulfa Drugs Hives and Rash     RXN=since childhood   • Tape Rash     Tears skin off   coban with Tegaderm tape ok  RXN=ongoing   • Vancomycin Itching     Pt becomes flushed in face and chest.   RXN=7/10/16   • Cephalexin [Keflex] Rash     Pt states she gets a rash with this medication   • Erythromycin    • Levofloxacin " "Unspecified     Leg muscle cramps   • Metronidazole    • Valproic Acid      Social History     Social History   • Marital Status: Single     Spouse Name: N/A   • Number of Children: N/A   • Years of Education: N/A     Occupational History   • Not on file.     Social History Main Topics   • Smoking status: Never Smoker    • Smokeless tobacco: Never Used   • Alcohol Use: No   • Drug Use: No   • Sexual Activity: Not Currently     Birth Control/ Protection: Pill     Other Topics Concern   • Not on file     Social History Narrative    ** Merged History Encounter **          Past Medical History   Diagnosis Date   • Scoliosis    • Multiple personality disorder    • Chronic UTI 9/18/2010   • Heart burn    • Pain 08-15-12     back, 7/10   • History of falling    • Sinus tachycardia 10/31/2013   • Urinary incontinence    • Hypertension    • Disorder of thyroid    • Obesity    • Pneumonia 2012   • ASTHMA      when around smoke   • Breath shortness      with tachycardia   • Anginal syndrome      random chest pain especially with tachycardia   • Psychosis    • Arthritis      osteo   • PCOS (polycystic ovarian syndrome)    • Gynecological disorder      PCOS   • Renal disorder      \"kidney disease, stage 1\" nephrologist, Dr. Vallejo   • Arrhythmia      \"sinus tachycardia\", cariologist, Dr. 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   • Sleep apnea      CPAP \"pulmonary doctor took me off mid year 2016\"   • Mitochondrial disease    • Fall      Current Outpatient Prescriptions on File Prior to Visit   Medication Sig Dispense Refill   • sitagliptin (JANUVIA) 100 MG Tab Take 1 Tab by mouth every day. 30 Tab 6   • BD PEN NEEDLE MALYL U/F 32G X 4 MM Misc      • tramadol (ULTRAM) 50 MG Tab      • non-formulary med Inhale 2 L/min by mouth Continuous. OXYGEN  Indications: TONYA     • cefdinir (OMNICEF) 300 MG Cap Take 300 mg by mouth 2 times a day. " Indications: Acute Infection of the Middle Ear     • ziprasidone (GEODON) 80 MG Cap TAKE 2 CAPSULES BY MOUTH NIGHTLY AT BEDTIME 60 Cap 5   • GRALISE 600 MG Tab Take 600 mg by mouth.     • nitroGLYCERIN (NITROSTAT) 0.3 MG SL tablet PLACE 1 TABLET UNDER TONGUE IF NEEDED FOR CHEST PAIN EVERY 5 MINUTES FOR 3 DOSES AS DIRECTED  0   • Mirabegron ER (MYRBETRIQ) 25 MG TABLET SR 24 HR Take 1 Tab by mouth every day.     • nitrofurantoin monohydr macro (MACROBID) 100 MG Cap Take 1 Cap by mouth 2 times a day, with meals. 8 Cap 0   • lactobacillus granules (LACTINEX/FLORANEX) Pack Take 1 Packet by mouth 3 times a day, with meals.     • oxycodone-acetaminophen (PERCOCET-10)  MG Tab Take 1-2 Tabs by mouth every 6 hours as needed for Severe Pain. Scheduled. (Patient taking differently: Take 2 Tabs by mouth 2 times a day. Scheduled.) 1 Tab 0   • levothyroxine (SYNTHROID) 75 MCG Tab Take 75 mcg by mouth Every morning on an empty stomach.     • albuterol 108 (90 BASE) MCG/ACT Aero Soln inhalation aerosol Inhale 2 Puffs by mouth every 6 hours as needed for Shortness of Breath.     • lactulose 10 GM/15ML Solution Take 10 g by mouth 2 times a day as needed.     • furosemide (LASIX) 40 MG Tab Take 40 mg by mouth every day.     • gabapentin (NEURONTIN) 300 MG Cap Take 600 mg by mouth 3 times a day.     • Cranberry 400 MG Tab Take 2 Tabs by mouth every day.     • promethazine (PHENERGAN) 25 MG Tab Take 1 Tab by mouth every 6 hours as needed for Nausea/Vomiting. 30 Tab 6   • aspirin EC (ECOTRIN) 81 MG Tablet Delayed Response Take 1 Tab by mouth every day. 30 Tab 6   • baclofen (LIORESAL) 10 MG Tab Take 1 Tab by mouth 3 times a day. 90 Tab 6   • ivabradine (CORLANOR) 5 MG Tab tablet Take 1 Tab by mouth 2 times a day, with meals. 60 Tab 6   • fluoxetine (PROZAC) 10 MG Cap TAKE ONE CAPSULE BY MOUTH ONCE A DAY 30 Cap 2   • Melatonin 5 MG Tab Take 10 mg by mouth every bedtime.     • fentanyl (DURAGESIC) 25 MCG/HR PATCH 72 HR Apply 1 Patch  to skin as directed every 72 hours.     • vitamin D, Ergocalciferol, (DRISDOL) 29012 UNITS Cap capsule Take 50,000 Units by mouth every Friday.     • cyanocobalamin (HM VITAMIN B12) 500 MCG tablet Take 1,000 mcg by mouth 2 times a day.     • sodium bicarbonate 325 MG Tab Take 2 Tabs by mouth 3 times a day.       No current facility-administered medications on file prior to visit.     Family History   Problem Relation Age of Onset   • Hypertension Mother    • Sleep Apnea Mother    • Heart Disease Mother    • Other Mother      hypothryod   • Hypertension Maternal Uncle    • Heart Disease Maternal Grandmother    • Hypertension Maternal Grandmother    • Other Sister      Narcolepsy;fibromyalsia;bone;nerve   • Genitourinary () Sister      endometriosis     Review of Systems   Constitutional: Positive for fever (patient states her normal temperature is 96 degrees.) and fatigue.   Eyes: Negative for pain and discharge.   Respiratory: Negative for cough, hemoptysis and sputum production.    Cardiovascular: Negative for chest pain.   Gastrointestinal: Positive for anorexia. Negative for nausea, vomiting and abdominal pain.   Musculoskeletal: Negative for myalgias, back pain, joint swelling and neck pain.   Skin: Positive for rash (patient has an area of erythema around her right breast medial aspect. See the diagram).        Right breast with a 1 cm scabbed over ulcer with surrounding faint erythema.   Neurological: Negative for tingling and headaches.   Psychiatric/Behavioral: The patient is not nervous/anxious.         Clouded thought processes.          Objective:     There were no vitals taken for this visit.     Physical Exam   Constitutional: She is oriented to person, place, and time. She appears well-nourished. No distress.       Eyes: Conjunctivae are normal. Right eye exhibits no discharge.   Cardiovascular: Normal rate and regular rhythm.    Pulmonary/Chest: Effort normal and breath sounds normal.    Musculoskeletal: Normal range of motion.   Neurological: She is alert and oriented to person, place, and time.   Skin: Skin is warm. She is not diaphoretic. There is erythema.   1 cm area of eschar medial right breast with 3+ centimeters of slight faint erythema surrounding the eschar.   Psychiatric: She has a normal mood and affect.   Vitals reviewed.  Patient states she is not allergic to doxycycline it was removed from the list.            Assessment/Plan:     Diagnosis: acute breast lesion/infection    I feel that this is a infected ulcer possible secondary infection due to picking. Patient will have the wound cultured even though that's a dry scab she will place Bactroban ointment on it twice a day after warm soaks I initiated doxycycline she will avoid the sun and I will call her with the culture results in 2 days. She'll return for worsening redness pain or fever. Otherwise she'll follow with her primary care provider.

## 2017-07-24 ENCOUNTER — TELEPHONE (OUTPATIENT)
Dept: MEDICAL GROUP | Facility: MEDICAL CENTER | Age: 27
End: 2017-07-24

## 2017-07-24 ENCOUNTER — HOME CARE VISIT (OUTPATIENT)
Dept: HOME HEALTH SERVICES | Facility: HOME HEALTHCARE | Age: 27
End: 2017-07-24
Payer: MEDICARE

## 2017-07-24 ENCOUNTER — HOSPITAL ENCOUNTER (EMERGENCY)
Facility: MEDICAL CENTER | Age: 27
End: 2017-07-25
Attending: EMERGENCY MEDICINE
Payer: MEDICARE

## 2017-07-24 ENCOUNTER — TELEPHONE (OUTPATIENT)
Dept: BEHAVIORAL HEALTH | Facility: PHYSICIAN GROUP | Age: 27
End: 2017-07-24

## 2017-07-24 ENCOUNTER — TELEPHONE (OUTPATIENT)
Dept: CARDIOLOGY | Facility: MEDICAL CENTER | Age: 27
End: 2017-07-24

## 2017-07-24 VITALS
RESPIRATION RATE: 16 BRPM | SYSTOLIC BLOOD PRESSURE: 148 MMHG | DIASTOLIC BLOOD PRESSURE: 79 MMHG | HEART RATE: 106 BPM | TEMPERATURE: 97.5 F

## 2017-07-24 DIAGNOSIS — F23 ACUTE PSYCHOSIS (HCC): ICD-10-CM

## 2017-07-24 DIAGNOSIS — F23 PSYCHOSIS DUE TO EMOTIONAL STRESS (HCC): ICD-10-CM

## 2017-07-24 LAB
ALBUMIN SERPL BCP-MCNC: 4.4 G/DL (ref 3.2–4.9)
ALBUMIN/GLOB SERPL: 1.7 G/DL
ALP SERPL-CCNC: 76 U/L (ref 30–99)
ALT SERPL-CCNC: 38 U/L (ref 2–50)
AMPHET UR QL SCN: NEGATIVE
ANION GAP SERPL CALC-SCNC: 12 MMOL/L (ref 0–11.9)
APAP SERPL-MCNC: <10 UG/ML (ref 10–30)
APPEARANCE UR: ABNORMAL
AST SERPL-CCNC: 24 U/L (ref 12–45)
BACTERIA #/AREA URNS HPF: ABNORMAL /HPF
BARBITURATES UR QL SCN: NEGATIVE
BASOPHILS # BLD AUTO: 0.6 % (ref 0–1.8)
BASOPHILS # BLD: 0.04 K/UL (ref 0–0.12)
BENZODIAZ UR QL SCN: NEGATIVE
BILIRUB SERPL-MCNC: 0.5 MG/DL (ref 0.1–1.5)
BILIRUB UR QL STRIP.AUTO: NEGATIVE
BUN SERPL-MCNC: 11 MG/DL (ref 8–22)
BZE UR QL SCN: NEGATIVE
CALCIUM SERPL-MCNC: 9.9 MG/DL (ref 8.5–10.5)
CANNABINOIDS UR QL SCN: NEGATIVE
CHLORIDE SERPL-SCNC: 101 MMOL/L (ref 96–112)
CO2 SERPL-SCNC: 24 MMOL/L (ref 20–33)
COLOR UR: YELLOW
CREAT SERPL-MCNC: 0.77 MG/DL (ref 0.5–1.4)
EOSINOPHIL # BLD AUTO: 0.14 K/UL (ref 0–0.51)
EOSINOPHIL NFR BLD: 2.1 % (ref 0–6.9)
EPI CELLS #/AREA URNS HPF: ABNORMAL /HPF
ERYTHROCYTE [DISTWIDTH] IN BLOOD BY AUTOMATED COUNT: 41 FL (ref 35.9–50)
GFR SERPL CREATININE-BSD FRML MDRD: >60 ML/MIN/1.73 M 2
GLOBULIN SER CALC-MCNC: 2.6 G/DL (ref 1.9–3.5)
GLUCOSE SERPL-MCNC: 121 MG/DL (ref 65–99)
GLUCOSE UR STRIP.AUTO-MCNC: NEGATIVE MG/DL
HCG UR QL: NEGATIVE
HCT VFR BLD AUTO: 39.1 % (ref 37–47)
HGB BLD-MCNC: 13.5 G/DL (ref 12–16)
HYALINE CASTS #/AREA URNS LPF: ABNORMAL /LPF
IMM GRANULOCYTES # BLD AUTO: 0.03 K/UL (ref 0–0.11)
IMM GRANULOCYTES NFR BLD AUTO: 0.4 % (ref 0–0.9)
KETONES UR STRIP.AUTO-MCNC: ABNORMAL MG/DL
LEUKOCYTE ESTERASE UR QL STRIP.AUTO: NEGATIVE
LYMPHOCYTES # BLD AUTO: 1.7 K/UL (ref 1–4.8)
LYMPHOCYTES NFR BLD: 25.1 % (ref 22–41)
MCH RBC QN AUTO: 30.2 PG (ref 27–33)
MCHC RBC AUTO-ENTMCNC: 34.5 G/DL (ref 33.6–35)
MCV RBC AUTO: 87.5 FL (ref 81.4–97.8)
METHADONE UR QL SCN: NEGATIVE
MICRO URNS: ABNORMAL
MONOCYTES # BLD AUTO: 0.5 K/UL (ref 0–0.85)
MONOCYTES NFR BLD AUTO: 7.4 % (ref 0–13.4)
MUCOUS THREADS #/AREA URNS HPF: ABNORMAL /HPF
NEUTROPHILS # BLD AUTO: 4.36 K/UL (ref 2–7.15)
NEUTROPHILS NFR BLD: 64.4 % (ref 44–72)
NITRITE UR QL STRIP.AUTO: NEGATIVE
NRBC # BLD AUTO: 0 K/UL
NRBC BLD AUTO-RTO: 0 /100 WBC
OPIATES UR QL SCN: POSITIVE
OXYCODONE UR QL SCN: POSITIVE
PCP UR QL SCN: NEGATIVE
PH UR STRIP.AUTO: 5 [PH]
PLATELET # BLD AUTO: 223 K/UL (ref 164–446)
PMV BLD AUTO: 11.4 FL (ref 9–12.9)
POC BREATHALIZER: 0 PERCENT (ref 0–0.01)
POTASSIUM SERPL-SCNC: 3.5 MMOL/L (ref 3.6–5.5)
PROPOXYPH UR QL SCN: NEGATIVE
PROT SERPL-MCNC: 7 G/DL (ref 6–8.2)
PROT UR QL STRIP: NEGATIVE MG/DL
RBC # BLD AUTO: 4.47 M/UL (ref 4.2–5.4)
RBC # URNS HPF: ABNORMAL /HPF
RBC UR QL AUTO: NEGATIVE
SALICYLATES SERPL-MCNC: 0 MG/DL (ref 15–25)
SODIUM SERPL-SCNC: 137 MMOL/L (ref 135–145)
SP GR UR REFRACTOMETRY: 1.03
SP GR UR STRIP.AUTO: 1.03
UROBILINOGEN UR STRIP.AUTO-MCNC: 1 MG/DL
WBC # BLD AUTO: 6.8 K/UL (ref 4.8–10.8)
WBC #/AREA URNS HPF: ABNORMAL /HPF

## 2017-07-24 PROCEDURE — 700105 HCHG RX REV CODE 258: Performed by: EMERGENCY MEDICINE

## 2017-07-24 PROCEDURE — 99285 EMERGENCY DEPT VISIT HI MDM: CPT

## 2017-07-24 PROCEDURE — 94760 N-INVAS EAR/PLS OXIMETRY 1: CPT

## 2017-07-24 PROCEDURE — 85025 COMPLETE CBC W/AUTO DIFF WBC: CPT

## 2017-07-24 PROCEDURE — 81025 URINE PREGNANCY TEST: CPT

## 2017-07-24 PROCEDURE — 665999 HH PPS REVENUE DEBIT

## 2017-07-24 PROCEDURE — 81001 URINALYSIS AUTO W/SCOPE: CPT | Mod: 91

## 2017-07-24 PROCEDURE — 96361 HYDRATE IV INFUSION ADD-ON: CPT

## 2017-07-24 PROCEDURE — 80053 COMPREHEN METABOLIC PANEL: CPT

## 2017-07-24 PROCEDURE — 80307 DRUG TEST PRSMV CHEM ANLYZR: CPT

## 2017-07-24 PROCEDURE — 93005 ELECTROCARDIOGRAM TRACING: CPT | Performed by: EMERGENCY MEDICINE

## 2017-07-24 PROCEDURE — G0156 HHCP-SVS OF AIDE,EA 15 MIN: HCPCS

## 2017-07-24 PROCEDURE — 96360 HYDRATION IV INFUSION INIT: CPT

## 2017-07-24 PROCEDURE — 90791 PSYCH DIAGNOSTIC EVALUATION: CPT

## 2017-07-24 PROCEDURE — 302970 POC BREATHALIZER: Performed by: EMERGENCY MEDICINE

## 2017-07-24 PROCEDURE — 665998 HH PPS REVENUE CREDIT

## 2017-07-24 RX ORDER — SODIUM CHLORIDE 9 MG/ML
1000 INJECTION, SOLUTION INTRAVENOUS ONCE
Status: COMPLETED | OUTPATIENT
Start: 2017-07-24 | End: 2017-07-24

## 2017-07-24 RX ADMIN — SODIUM CHLORIDE 1000 ML: 9 INJECTION, SOLUTION INTRAVENOUS at 16:45

## 2017-07-24 NOTE — ED NOTES
Chief Complaint   Patient presents with   • Hallucinations   • Psych Eval     Patient states history of schizophrenia, takes her medication as prescribed, but still having bad hallucinations. Denies SI or HI, states she is just very scared. Behavioral health notified.

## 2017-07-24 NOTE — ED AVS SNAPSHOT
[employer] Access Code: Activation code not generated  Current [employer] Status: Active    [employer]  A secure, online tool to manage your health information     [employer] [employer]® is a secure, online tool that connects you to your personalized health information from the privacy of your home -- day or night - making it very easy for you to manage your healthcare. Once the activation process is completed, you can even access your medical information using the [employer] rocio, which is available for free in the Apple Rocio store or Google Play store.     [employer] provides the following levels of access (as shown below):   My Chart Features   [facility] Primary Care Doctor [facility]  Specialists [facility]  Urgent  Care Non-[facility]  Primary Care  Doctor   Email your healthcare team securely and privately 24/7 X X X X   Manage appointments: schedule your next appointment; view details of past/upcoming appointments X      Request prescription refills. X      View recent personal medical records, including lab and immunizations X X X X   View health record, including health history, allergies, medications X X X X   Read reports about your outpatient visits, procedures, consult and ER notes X X X X   See your discharge summary, which is a recap of your hospital and/or ER visit that includes your diagnosis, lab results, and care plan. X X       How to register for [employer]:  1. Go to  [email or web address].  2. Click on the Sign Up Now box, which takes you to the New Member Sign Up page. You will need to provide the following information:  a. Enter your [employer] Access Code exactly as it appears at the top of this page. (You will not need to use this code after you’ve completed the sign-up process. If you do not sign up before the expiration date, you must request a new code.)   b. Enter your date of birth.   c. Enter your home email address.   d. Click Submit, and follow the next screen’s instructions.  3. Create a [employer] ID. This will  be your [employer] login ID and cannot be changed, so think of one that is secure and easy to remember.  4. Create a [employer] password. You can change your password at any time.  5. Enter your Password Reset Question and Answer. This can be used at a later time if you forget your password.   6. Enter your e-mail address. This allows you to receive e-mail notifications when new information is available in [employer].  7. Click Sign Up. You can now view your health information.    For assistance activating your [employer] account, call [phone]

## 2017-07-24 NOTE — ED AVS SNAPSHOT
7/25/2017    Kristin Balderrama  [address] [facility] [address]    Dear Kristin:    [facility] wants to ensure your discharge home is safe and you or your loved ones have had all of your questions answered regarding your care after you leave the hospital.    Below is a list of resources and contact information should you have any questions regarding your hospital stay, follow-up instructions, or active medical symptoms.    Questions or Concerns Regarding… Contact   Medical Questions Related to Your Discharge  (7 days a week, 8am-5pm) Contact a Nurse Care Coordinator   [phone]   Medical Questions Not Related to Your Discharge  (24 hours a day / 7 days a week)  Contact the Nurse Health Line   [phone]    Medications or Discharge Instructions Refer to your discharge packet   or contact your [facility] Primary Care Provider   [phone]   Follow-up Appointment(s) Schedule your appointment via [employer]   or contact Scheduling [phone]   Billing Review your statement via [employer]  or contact Billing [phone]   Medical Records Review your records via [employer]   or contact Medical Records [phone]     You may receive a telephone call within two days of discharge. This call is to make certain you understand your discharge instructions and have the opportunity to have any questions answered. You can also easily access your medical information, test results and upcoming appointments via the [employer] free online health management tool. You can learn more and sign up at [employer]. For assistance setting up your [employer] account, please call [phone].    Once again, we want to ensure your discharge home is safe and that you have a clear understanding of any next steps in your care. If you have any questions or concerns, please do not hesitate to contact us, we are here for you. Thank you for choosing [facility] for your healthcare needs.    Sincerely,    Your [facility] Healthcare Team

## 2017-07-24 NOTE — ED PROVIDER NOTES
ED Provider Note    Scribed for Terence Ruff M.D. by [facility]. 7/24/2017, 4:17 PM.    Primary care provider: Torres Brody M.D.  Means of arrival: Walk-In  History obtained from: Patient  History limited by: None    CHIEF COMPLAINT  Chief Complaint   Patient presents with   • Hallucinations   • Psych Eval       HPI  Kristin Balderrama is a 26 y.o. female with a history of schizophrenia who presents to the Emergency Department complaining of hallucinations for the past 3 days. Patient notes taking Geodon for her schizophrenia which she has been compliant with. Confirms a past medical history of UTI but denies feeling UTI symptoms currently. Associated symptoms include an infection on the right breast with discharge and decreased appetite. Patient notes having her catheter removed 1.5 months ago. Denies fevers but notes feeling hot. Denies vomiting, diarrhea. Confirms taking various other prescribed medications as listed below but denies any illicit drug use.      REVIEW OF SYSTEMS  Pertinent positives include hallucinations, wound, decreased appetite. Pertinent negatives include no fever, vomiting, diarrhea. All other systems reviewed and negative.    PAST MEDICAL HISTORY   has a past medical history of Scoliosis; Multiple personality disorder; Chronic UTI (9/18/2010); Heart burn; Pain (08-15-12); History of falling; Sinus tachycardia (10/31/2013); Urinary incontinence; Hypertension; Disorder of thyroid; Obesity; Pneumonia (2012); ASTHMA; Breath shortness; Anginal syndrome; Psychosis; Arthritis; PCOS (polycystic ovarian syndrome); Gynecological disorder; Renal disorder; Arrhythmia; Urinary bladder disorder; Tuberculosis; Sleep apnea; Mitochondrial disease; and Fall.    SURGICAL HISTORY   has past surgical history that includes neuro dest facet l/s w/ig sngl (4/21/2015); recovery (1/27/2016); delmar by laparoscopy (8/29/2010); lumbar fusion anterior (8/21/2012); other cardiac surgery (1/2016); tonsillectomy;  "bowel stimulator insertion (7/13/2016); gastroscopy with balloon dilatation (N/A, 1/4/2017); muscle biopsy (Right, 1/26/2017); and other abdominal surgery.    SOCIAL HISTORY  Social History   Substance Use Topics   • Smoking status: Never Smoker    • Smokeless tobacco: Never Used   • Alcohol Use: No      History   Drug Use No       FAMILY HISTORY  Family History   Problem Relation Age of Onset   • Hypertension Mother    • Sleep Apnea Mother    • Heart Disease Mother    • Other Mother      hypothryod   • Hypertension Maternal Uncle    • Heart Disease Maternal Grandmother    • Hypertension Maternal Grandmother    • Other Sister      Narcolepsy;fibromyalsia;bone;nerve   • Genitourinary () Sister      endometriosis       CURRENT MEDICATIONS  Home Medications     **Home medications have not yet been reviewed for this encounter**          ALLERGIES  Allergies   Allergen Reactions   • Cefdinir Shortness of Breath and Itching     Tolerated 1/18/17   • Depakote [Divalproex Sodium] Unspecified     Muscle spasms/muscle pain and weakness     • Abilify Unspecified     Headaches/muscle twitching     • Amitriptyline Unspecified     Headaches     • Amoxicillin Rash     Pt states \"I get a rash\".     • Ciprofloxacin Rash     Pt states \"I get a rash\".     • Clindamycin Nausea     Even with food     • Ees [Erythromycin] Vomiting and Nausea   • Flagyl [Metronidazole Hcl] Unspecified     \"eye problems\"     • Flomax [Tamsulosin Hydrochloride] Swelling   • Metformin Unspecified     Increased lactic acid      • Sulfa Drugs Hives and Rash     RXN=since childhood   • Tape Rash     Tears skin off   coban with Tegaderm tape ok  RXN=ongoing   • Vancomycin Itching     Pt becomes flushed in face and chest.   RXN=7/10/16   • Cephalexin [Keflex] Rash     Pt states she gets a rash with this medication   • Erythromycin    • Levofloxacin Unspecified     Leg muscle cramps   • Metronidazole    • Valproic Acid        PHYSICAL EXAM  VITAL SIGNS: BP " "143/91 mmHg  Pulse 137  Temp(Src) 36.7 °C (98.1 °F)  Resp 18  Ht 1.6 m (5' 2.99\")  Wt 72.576 kg (160 lb)  BMI 28.35 kg/m2  SpO2 98%    Constitutional: Well developed, Well nourished, No acute distress, Non-toxic appearance.   HENT: Normocephalic, Atraumatic, mucous membranes moist, no erythema, exudates, swelling, or masses, nares patent  Eyes: nonicteric  Neck: Supple, no meningismus  Lymphatic: No lymphadenopathy noted.   Cardiovascular: Tachycardic. Regular rhythm, no gallops rubs or murmurs  Lungs: Clear bilaterally   Abdomen: Bowel sounds normal, Soft, No tenderness  Skin: 1 cm superficial ulcerative area with no sig erythema or discharge. Warm, Dry, no rash   Genitalia: Deferred  Rectal: Deferred  Extremities: No edema  Neurologic: Alert, appropriate, follows commands, moving all extremities, normal speech   Psychiatric: Affect normal      DIAGNOSTIC STUDIES / PROCEDURES    LABS  Results for orders placed or performed during the hospital encounter of 07/24/17   CBC WITH DIFFERENTIAL   Result Value Ref Range    WBC 6.8 4.8 - 10.8 K/uL    RBC 4.47 4.20 - 5.40 M/uL    Hemoglobin 13.5 12.0 - 16.0 g/dL    Hematocrit 39.1 37.0 - 47.0 %    MCV 87.5 81.4 - 97.8 fL    MCH 30.2 27.0 - 33.0 pg    MCHC 34.5 33.6 - 35.0 g/dL    RDW 41.0 35.9 - 50.0 fL    Platelet Count 223 164 - 446 K/uL    MPV 11.4 9.0 - 12.9 fL    Neutrophils-Polys 64.40 44.00 - 72.00 %    Lymphocytes 25.10 22.00 - 41.00 %    Monocytes 7.40 0.00 - 13.40 %    Eosinophils 2.10 0.00 - 6.90 %    Basophils 0.60 0.00 - 1.80 %    Immature Granulocytes 0.40 0.00 - 0.90 %    Nucleated RBC 0.00 /100 WBC    Neutrophils (Absolute) 4.36 2.00 - 7.15 K/uL    Lymphs (Absolute) 1.70 1.00 - 4.80 K/uL    Monos (Absolute) 0.50 0.00 - 0.85 K/uL    Eos (Absolute) 0.14 0.00 - 0.51 K/uL    Baso (Absolute) 0.04 0.00 - 0.12 K/uL    Immature Granulocytes (abs) 0.03 0.00 - 0.11 K/uL    NRBC (Absolute) 0.00 K/uL   URINALYSIS   Result Value Ref Range    Color Yellow     " Character Cloudy (A)     Specific Gravity 1.031 <1.035    Ph 5.0 5.0-8.0    Glucose Negative Negative mg/dL    Ketones Trace (A) Negative mg/dL    Protein Negative Negative mg/dL    Bilirubin Negative Negative    Urobilinogen, Urine 1.0 Negative    Nitrite Negative Negative    Leukocyte Esterase Negative Negative    Occult Blood Negative Negative    Micro Urine Req Microscopic    URINE MICROSCOPIC (W/UA)   Result Value Ref Range    WBC 2-5 /hpf    RBC 0-2 /hpf    Bacteria Few (A) None /hpf    Epithelial Cells Many (A) /hpf    Mucous Threads Moderate /hpf    Hyaline Cast 6-10 (A) /lpf   EKG (ER)   Result Value Ref Range    Report       [facility] Emergency Dept.    Test Date:  2017  Pt Name:    HARLEY DE LA CRUZ              Department: ER  MRN:        [id number]                      Room:       [facility]  Gender:     F                            Technician: [id number]  :        1990                   Requested By:BASIA PARNELL  Order #:    [id number]                    Reading MD:    Measurements  Intervals                                Axis  Rate:       104                          P:          52  GA:         160                          QRS:        1  QRSD:       90                           T:          -31  QT:         338  QTc:        445    Interpretive Statements  SINUS TACHYCARDIA  CONSIDER LEFT VENTRICULAR HYPERTROPHY  BORDERLINE T ABNORMALITIES, INFERIOR LEADS  BASELINE WANDER IN LEAD(S) V2,V3  Compared to ECG 2017 18:22:17  Sinus rhythm no longer present  T-wave abnormality still present       *Note: Due to a large number of results and/or encounters for the requested time period, some results have not been displayed. A complete set of results can be found in Results Review.     All labs reviewed by me.    EKG obtained by me at 4:31 PM  12 Lead EKG interpreted by me to show:  Normal sinus tachycarida  Rate 104  Axis: Normal  Intervals: Normal  Borderline flattening in T waves  inferiorly.  Normal ST segments  My impression of this EKG: Does not indicate ischemia or arrythmia at this time.      COURSE & MEDICAL DECISION MAKING  Nursing notes, VS, PMSFHx reviewed in chart.     4:17 PM Patient seen and examined at bedside. Informed patient that labs will be ordered to rule out an infection as the source of her hallucinations. The patient presents with hallucinations and the differential diagnosis includes but is not limited to acute psychosis, overdose, schizophrenia. Ordered for CBC with differential, CMP, urinalysis, urine drug screen, salicylate, acetaminophen, EKG to evaluate. Patient was treated with IV fluids for her symptoms.     Decision Making:  This is a 26 y.o. year old female who presents with acute psychosis. The patient reports a history of schizophrenia. Initially her heart rate was elevated but that has normalized. Metabolically I see no evidence of significant Y derangement. Tylenol and aspirin are negative. She apparently overdosed on her own medication within the last week. This appears to been accidental. Given the hallucinations and apparent inability to affectively care for herself she was placed on a legal hold and will be transferred for further psychiatric evaluation.    The patient will return for new or worsening symptoms and is stable at the time of discharge.    The patient is referred to a primary physician for blood pressure management, diabetic screening, and for all other preventative health concerns.    DISPOSITION:  Patient will be discharged home in stable condition.    FOLLOW UP:  No follow-up provider specified.    OUTPATIENT MEDICATIONS:  New Prescriptions    No medications on file           FINAL IMPRESSION  1. 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 Acute psychosis          [facility] RODRIGUEZ), am scribing for, and in the presence of, Terence Ruff M.D..    Electronically signed by: Sinai Espinosa), 7/24/2017    Ternece RODRIGUEZ M.D.  personally performed the services described in this documentation, as scribed by S[facility] York in my presence, and it is both accurate and complete.    The note accurately reflects work and decisions made by me.  Terence Ruff  7/24/2017  7:39 PM

## 2017-07-24 NOTE — ED AVS SNAPSHOT
Home Care Instructions                                                                                                                Kristin Balderrama   MRN: [id number]    Department:  [facility], Emergency Dept   Date of Visit:  7/24/2017            [facility], Emergency Dept    [address] [facility] [address]    Phone:  [phone]      You were seen by     1. Terence Ruff M.D.    2. Glynn Hinds M.D.    3. Ruth Ann Thomas D.O.    4. Torres Abdul M.D.      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28       These are the medications you received during your hospitalization from 07/24/2017 1531 to 07/25/2017 2016     Date/Time Order Dose Route Action    07/24/2017 1645 NS infusion 1,000 mL 1,000 mL Intravenous New Bag    07/25/2017 0551 oxycodone-acetaminophen (PERCOCET-10)  MG per tablet 1 Tab 1 Tab Oral Given    07/25/2017 0654 ziprasidone (GEODON) capsule 160 mg 160 mg Oral Given    07/25/2017 0721 fluoxetine (PROZAC) capsule 10 mg 10 mg Oral Given    07/25/2017 1209 ziprasidone (GEODON) capsule 40 mg 40 mg Oral Given    07/25/2017 1846 baclofen (LIORESAL) tablet 10 mg 10 mg Oral Given    07/25/2017 1845 oxycodone-acetaminophen (PERCOCET-10)  MG per tablet 2 Tab 2 Tab Oral Given    07/25/2017 1845 gabapentin (NEURONTIN) capsule 600 mg 600 mg Oral Given      Follow-up Information     1. Follow up with Torres Brody M.D. In 1 week.    Specialty:  Internal Medicine    Contact information    [address] [facility] [address]  [facility] [address] [phone]        Medication Information     Review all of your home medications and newly ordered medications with your primary doctor and/or pharmacist as soon as possible. Follow medication instructions as directed by your doctor and/or pharmacist.     Please keep your complete medication list with you and share with your physician. Update the information when medications are discontinued,  doses are changed, or new medications (including over-the-counter products) are added; and carry medication information at all times in the event of emergency situations.               Medication List      ASK your doctor about these medications        Instructions    Morning Afternoon Evening Bedtime    aspirin EC 81 MG Tbec   Commonly known as:  ECOTRIN        Take 1 Tab by mouth every day.   Dose:  81 mg                        baclofen 10 MG Tabs   Last time this was given:  10 mg on 7/25/2017  6:46 PM   Commonly known as:  LIORESAL        Take 1 Tab by mouth 3 times a day.   Dose:  10 mg                        cefdinir 300 MG Caps   Commonly known as:  OMNICEF        Take 300 mg by mouth 2 times a day. Pt started on 7/4/2017 for 7 day course for ear infection.  Indications: Acute Infection of the Middle Ear   Dose:  300 mg                        Cranberry 400 MG Tabs        Take 2 Tabs by mouth every day.   Dose:  2 Tab                        doxycycline 100 MG Tabs   Commonly known as:  VIBRAMYCIN        Take 100 mg by mouth 2 times a day. Pt started on 7/23/2017 for 10 day course for cellulitis of breast   Dose:  100 mg                        fentanyl 25 MCG/HR Pt72   Commonly known as:  DURAGESIC        Apply 1 Patch to skin as directed every 72 hours.   Dose:  1 Patch                        fluoxetine 10 MG Caps   Last time this was given:  10 mg on 7/25/2017  7:21 AM   Commonly known as:  PROZAC        TAKE ONE CAPSULE BY MOUTH ONCE A DAY                        furosemide 40 MG Tabs   Commonly known as:  LASIX        Take 40 mg by mouth every day.   Dose:  40 mg                        GRALISE 600 MG Tabs   Generic drug:  Gabapentin (Once-Daily)        Take 600 mg by mouth 3 times a day.   Dose:  600 mg                        HM VITAMIN B12 500 MCG tablet   Generic drug:  cyanocobalamin        Take 500 mcg by mouth 2 times a day.   Dose:  500 mcg                        ivabradine 5 MG Tabs tablet   Commonly  known as:  CORLANOR        Take 1 Tab by mouth 2 times a day, with meals.   Dose:  5 mg                        lactobacillus granules Pack        Take 1 Packet by mouth 3 times a day, with meals.   Dose:  1 Packet                        lactulose 10 GM/15ML Soln        Take 10 g by mouth 2 times a day as needed (For constipation).   Dose:  10 g                        levothyroxine 75 MCG Tabs   Commonly known as:  SYNTHROID        Take 75 mcg by mouth Every morning on an empty stomach.   Dose:  75 mcg                        Melatonin 5 MG Tabs        Doctor's comments:  Takes two tabs at Bedtime (10 mg.)   Take 10 mg by mouth every bedtime.   Dose:  10 mg                        mupirocin 2 % Oint   Commonly known as:  BACTROBAN        Apply 1 Application to affected area(s) every day. Pt started on 7/23/2017 for cellulitis of breast   Dose:  1 Application                        MYRBETRIQ 25 MG Tb24   Generic drug:  Mirabegron ER        Take 1 Tab by mouth every day.   Dose:  1 Tab                        nitroGLYCERIN 0.3 MG SL tablet   Commonly known as:  NITROSTAT        PLACE 1 TABLET UNDER TONGUE IF NEEDED FOR CHEST PAIN EVERY 5 MINUTES FOR 3 DOSES AS DIRECTED                        oxycodone-acetaminophen  MG Tabs   Last time this was given:  2 Tabs on 7/25/2017  6:45 PM   Commonly known as:  PERCOCET-10        Take 2 Tabs by mouth 2 Times a Day. Indications: Pain   Dose:  2 Tab                        promethazine 25 MG Tabs   Commonly known as:  PHENERGAN        Take 1 Tab by mouth every 6 hours as needed for Nausea/Vomiting.   Dose:  25 mg                        sitagliptin 100 MG Tabs   Commonly known as:  JANUVIA        Take 1 Tab by mouth every day.   Dose:  50 mg                        sodium bicarbonate 325 MG Tabs        Take 2 Tabs by mouth 3 times a day.   Dose:  650 mg                        tobramycin 0.3 % Soln ophthalmic solution   Commonly known as:  TOBREX        Place 2 Drops in both eyes  every 4 hours. Pt started on 7/4/2017 for 7 day course for ear infection.   Dose:  2 Drop                        vitamin D (Ergocalciferol) 91316 UNITS Caps capsule   Commonly known as:  DRISDOL        Take 50,000 Units by mouth every Friday.   Dose:  92063 Units                        ziprasidone 80 MG Caps   Last time this was given:  40 mg on 7/25/2017 12:09 PM   Commonly known as:  GEODON        TAKE 2 CAPSULES BY MOUTH NIGHTLY AT BEDTIME                                Procedures and tests performed during your visit     Procedure/Test Number of Times Performed    ACCU-CHEK 2    ACCU-CHEK GLUCOSE 3    ACETAMINOPHEN 1    BETA-HCG QUALITATIVE URINE 1    CARDIAC MONITORING 1    CBC WITH DIFFERENTIAL 1    COMP METABOLIC PANEL 1    EKG (ER) 1    ESTIMATED GFR 1    POC BREATHALIZER 1    PULSE OX 1    REFRACTOMETER SG 1    SALICYLATE 1    URINALYSIS 1    URINE DRUG SCREEN 1    URINE MICROSCOPIC (W/UA) 1        Discharge Instructions       Psychosis  Psychosis refers to a severe loss of contact with reality. During a psychotic episode, a person is not able to think clearly, and his or her emotions and responses do not match up with what is actually happening. Someone may have false beliefs about what is happening or who they are (delusions). Someone may see, hear, taste, smell, or feel things that are not present (hallucinations).   Psychosis usually occurs with very serious mental health (psychiatric) conditions such as schizophrenia, bipolar disorder, or major depression. It can sometimes also be the result of drug use or certain medical conditions.  SYMPTOMS  Symptoms of a psychotic episode include:  · Delusions, such as:  ¨ Feeling excessive fear or suspicion (paranoia).  ¨ Believing something that is odd, unrealistic, or false, such as having a false belief about being someone else.  · Hallucinations.  · Disorganized thinking, such as thoughts that jump from one to another that do not make sense to  others.  · Disorganized speech, such as saying things that do not make sense to others.  · Inappropriate behavior, such as talking to oneself or intruding on unfamiliar people.  DIAGNOSIS  A diagnosis of psychosis is made through an assessment by a health care provider, who will ask questions about thoughts, feelings, behavior, drug use, and medical conditions. The health care provider may also do one or more of the following:  · Physical exam.  · Blood tests.  · Brain imaging, such as a CT scan or MRI.  · Brain wave study (EEG).  The health care provider may make a referral for further evaluation by a mental health professional.  TREATMENT   Treatment depends on the cause of the psychosis. Treatment may include one or more of the following:  · Monitoring and supportive care in the emergency room or hospital.    · Taking medicines (antipsychotic medicine) to reduce symptoms and to balance chemicals in the brain.  · Treating an underlying medical condition.  · Stopping or reducing drugs that are causing psychosis.  · Therapy and other supportive programs outside of the hospital.  HOME CARE INSTRUCTIONS  · Over-the-counter and prescription medicines should be taken only as told by the health care provider.  · The health care provider should be consulted before over-the-counter medicines, herbs, or supplements are used.  · All follow-up visits should be kept as told by the health care provider. This is important.  · A healthy lifestyle should be maintained. This includes:  ¨ Eating a healthy diet.  ¨ Getting enough sleep.  ¨ Exercising regularly.  ¨ Avoiding alcohol and recreational drugs as told by the health care provider.  SEEK MEDICAL CARE IF:  · Medicines do not seem to be helping.  · The person hears voices telling him or her to do things.  · The person continues to see, smell, or feel things that are not there.  · The person feels extremely fearful and suspicious that someone or something will harm him or  her.  · The person feels unable to leave his or her house.  · The person has trouble taking care of himself or herself.  · The person experiences side effects of medicines, such as:  ¨ Changes in sleep patterns.  ¨ Dizziness.  ¨ Weight gain.  ¨ Restlessness.  ¨ Movement changes.  ¨ Muscle spasms.  ¨ Tremors.  SEEK IMMEDIATE MEDICAL CARE IF:  · Serious thoughts occur about self-harm or about hurting others.  · There are serious side effects of medicine, such as:  ¨ Swelling of the face, lips, tongue, or throat.  ¨ Fever, confusion, muscle spasms, or seizures.     This information is not intended to replace advice given to you by your health care provider. Make sure you discuss any questions you have with your health care provider.     Document Released: 06/07/2011 Document Revised: 05/03/2016 Document Reviewed: 12/22/2015  [employer] Interactive Patient Education ©2016 [employer]            Patient Information     Patient Information    Following emergency treatment: all patient requiring follow-up care must return either to a private physician or a clinic if your condition worsens before you are able to obtain further medical attention, please return to the emergency room.     Billing Information    At [facility], we work to make the billing process streamlined for our patients.  Our Representatives are here to answer any questions you may have regarding your hospital bill.  If you have insurance coverage and have supplied your insurance information to us, we will submit a claim to your insurer on your behalf.  Should you have any questions regarding your bill, we can be reached online or by phone as follows:  Online: You are able pay your bills online or live chat with our representatives about any billing questions you may have. We are here to help Monday - Friday from 8:00am to 7:30pm and 9:00am - 12:00pm on Saturdays.  Please visit [email or web address]  for more information.    Phone:  [phone] or [phone]    Please note that your emergency physician, surgeon, pathologist, radiologist, anesthesiologist, and other specialists are not employed by [facility] and will therefore bill separately for their services.  Please contact them directly for any questions concerning their bills at the numbers below:     Emergency Physician Services:  [phone]  [facility]:  [phone]  [facility]:  [phone]  [facility]:  [phone]    1. Your final bill may vary from the amount quoted upon discharge if all procedures are not complete at that time, or if your doctor has additional procedures of which we are not aware. You will receive an additional bill if you return to the Emergency Department at [facility] for suture removal regardless of the facility of which the sutures were placed.     2. Please arrange for settlement of this account at the emergency registration.    3. All self-pay accounts are due in full at the time of treatment.  If you are unable to meet this obligation then payment is expected within 4-5 days.     4. If you have had radiology studies (CT, X-ray, Ultrasound, MRI), you have received a preliminary result during your emergency department visit. Please contact the radiology department [phone] to receive a copy of your final result. Please discuss the Final result with your primary physician or with the follow up physician provided.     Crisis Hotline:  [place] Crisis Hotline:  5-880-DCYTRZS or 1-909.152.5609  [place] Crisis Hotline:    1-444.808.6759 or 286-890-2711         ED Discharge Follow Up Questions    1. In order to provide you with very good care, we would like to follow up with a phone call in the next few days.  May we have your permission to contact you?     YES /  NO    2. What is the best phone number to call you? (       )_____-__________    3. What is the best time to call you?       Morning  /  Afternoon  /  Evening                   Patient Signature:  ____________________________________________________________    Date:  ____________________________________________________________      Your appointments     Jul 26, 2017 To Be Determined   AIDE-HH-HOME VISIT with DAIN WeemsNNAE   [facility] (--)    [address]  [place] [address]   [phone]            Jul 28, 2017 To Be Determined   SN-HH-HOME VISIT with Joe Ramirez R.N.   [facility] (--)    [address]  [place] [address]   [phone]            Jul 28, 2017  1:00 PM   AIDE-HH-HOME VISIT with Karla Marquez C.N.A.   [facility] (--)    [address]  [place] [address]   [phone]            Jul 31, 2017 To Be Determined   AIDE-HH-HOME VISIT with Karla Marquez C.N.A.   [facility] (--)    [address]  [place] [address]   [phone]            Aug 01, 2017 To Be Determined   SN-HH-HOME VISIT with [facility] (--)    [address]  [place] [address]   [phone]            Aug 02, 2017 To Be Determined   AIDE-HH-HOME VISIT with [facility] Aide   [facility] (--)    [address]  [place] [address]   [phone]            Aug 04, 2017 To Be Determined   AIDE-HH-HOME VISIT with Karla Marquez C.N.A.   [facility] (--)    [address] [facility]  [place] [address]   [phone]            Aug 07, 2017 To Be Determined   AIDE-HH-HOME VISIT with Karla Marquez C.N.A.   [facility] (--)    [address]  [place] [address]   [phone]            Aug 08, 2017 To Be Determined   SN-HH-HOME VISIT with Joe Ramirez R.N.   [facility] (--)    [facility] [address] [facility] [address] [facility]  [facility] [address]   [phone]            Aug 09, 2017 To Be Determined   AIDE-HH-HOME VISIT with [facility] Aide   [facility] (--)    [facility]  [facility] [address]   [phone]            Aug 11, 2017 10:00  AM   Follow Up Visit with Sandoval Fox M.D.   [facility] (--)    [address], [address]  [place] [address] [phone]           You will be receiving a confirmation call a few days before your appointment from our automated call confirmation system.            Aug 11, 2017 To Be Determined   AIDE-HH-HOME VISIT with Karla Marquez C.N.A.   [facility] (--)    [address]  [place] [address]   [phone]            Aug 14, 2017 To Be Determined   AIDE-HH-HOME VISIT with Karla Marquez C.N.A.   [facility] (--)    [address]  [place] [address]   [phone]            Aug 15, 2017 To Be Determined   SN-HH-HOME VISIT with Joe Ramirez R.N.   [facility] (--)    [address]  [place] [address]   [phone]            Aug 16, 2017 To Be Determined   AIDE-HH-HOME VISIT with [facility] Aide   [facility] (--)    [address]  [place] [address]   [phone]            Aug 18, 2017 To Be Determined   AIDE-HH-HOME VISIT with Karla Marquez C.N.A.   [facility] (--)    [address]  [place] [address]   [phone]            Aug 21, 2017 To Be Determined   AIDE-HH-HOME VISIT with Karla Marquez C.N.A.   [facility] (--)    [address]  [place] [address]   [phone]            Aug 22, 2017 To Be Determined   SN-HH-HOME VISIT with Joe Ramirez R.N.   [facility] (--)    [address]  [place] [address]   [phone]            Aug 23, 2017 To Be Determined   AIDE-HH-HOME VISIT with [facility] Aide   [facility] (--)    [address]  [facility] [address]   [phone]            Aug 24, 2017 11:30 AM   Follow Up Med Management with Ronny Burnette M.D.   [facility] [address] [place] [address])    [address] [place] [address]   [phone]            Aug 25, 2017 To Be Determined   AIDE-HH-HOME VISIT with Karla Marquez C.N.A.   [facility]  Care (--)    [address]  [place] [address]   [phone]            Aug 29, 2017  8:00 AM   Individual Therapy with Blanca Brantley L.C.S.W.   [facility]    [address]  [address]  [place] [address] [phone]            Aug 29, 2017 To Be Determined   SN-HH-HOME VISIT with [facility] (--)    [address]  [place] [address]   [phone]            Sep 18, 2017  8:00 AM   Individual Therapy with Blanca Brantley L.C.S.W.   [facility] ([place]    [address]  [address]  [place] [address]   [phone]            Oct 04, 2017 10:00 AM   Individual Therapy with Blanca Brantley L.C.S.W.   [facility] ([place])    [address] [facility]  [address]  [place] [address] [phone]            Oct 06, 2017  7:20 AM   Established Patient with Torres Brody M.D.   [facility] [address]    [address]  [address]  [facility] [address] [phone]           You will be receiving a confirmation call a few days before your appointment from our automated call confirmation system.

## 2017-07-24 NOTE — TELEPHONE ENCOUNTER
Future Appointments       Provider Department [place]    7/24/2017 4:00 PM Karla Marquez C.N.A. [facility]     7/25/2017 8:20 AM Torres Brody M.D. [address] [facility]    7/25/2017 To Be Determined Shelbi Ruby R.N. [facility]     7/26/2017 9:00 AM Emmy Allison C.N.A. [facility]     7/28/2017 To Be Determined Joe Ramirez R.N. [facility]     7/28/2017 1:00 PM Karla Marquez C.N.A. [facility]     7/31/2017 To Be Determined Karla Marquez C.N.A. [facility]     8/1/2017 To Be Determined [facility]     8/2/2017 To Be Determined [facility]     8/4/2017 To Be Determined Karla Marquez C.N.A. [facility]     8/7/2017 To Be Determined DAIN IrahetaNNAE [facility]     8/8/2017 To Be Determined Joe Ramirez R.N. [facility]     8/9/2017 To Be Determined [facility]     8/11/2017 10:00 AM Sandoval Fox M.D. [facility]     8/11/2017 To Be Determined Karla Marquez C.N.A. [facility]     8/14/2017 To Be Determined DAIN IrahetaNNAE [facility]     8/15/2017 To Be Determined Joe Ramirez R.N. [facility]     8/16/2017 To Be Determined [facility]     8/18/2017 To Be Determined Karla Marquez C.N.A. [facility]     8/21/2017 To Be Determined Karla Marquez C.N.A. [facility]     8/22/2017 To Be Determined Joe Ramirez R.N. [facility]     8/23/2017 To Be Determined [facility]     8/24/2017 11:30 AM Ronny Burnette M.D. [facility] [address] [place] [address]    8/25/2017 To Be Determined Karla Marquez C.N.A. [facility]     8/29/2017 8:00 AM Blanca Brantley L.C.S.W. [facility]    8/29/2017 To Be Determined Sinai Hooker R.N. [facility]     9/18/2017 8:00 AM  Blanca Brantley L.C.S.W. [facility]    10/4/2017 10:00 AM Blanca Brantley L.C.S.W. [facility]    10/6/2017 7:20 AM Torres Brody M.D. [facility] [address]        ESTABLISHED PATIENT PRE-VISIT PLANNING     Note: Patient will not be contacted if there is no indication to call.     1.  Reviewed note from last office visit with PCP and/or other med group provider: Yes    2.  If any orders were placed at last visit, do we have Results/Consult Notes?        •  Labs - Labs were not ordered at last office visit.       •  Imaging - Imaging was not ordered at last office visit.       •  Referrals - No referrals were ordered at last office visit.    3.  Immunizations were updated in Epic using WebIZ?: Yes       •  Web Iz Recommendations: HEPATITIS A  and VARICELLA (Chicken Pox)     4.  Patient is due for the following Health Maintenance Topics:   Health Maintenance Due   Topic Date Due   • IMM VARICELLA (CHICKENPOX) VACCINE (1 of 2 - 2 Dose Adolescent Series) 10/13/2002   • Annual Wellness Visit  07/23/2017           5.  Patient was not informed to arrive 15 min prior to their scheduled appointment and bring in their medication bottles.

## 2017-07-24 NOTE — TELEPHONE ENCOUNTER
PAR pending and in process  Kristin Balderrama James: [id number] - PA Case ID: [id number] - Rx #: [id number]   Outcome   Pendingtoday   Questionnaire submitted. PA Case [id number] Status: Pending review.   DrugCorlanor 5MG tablets   [employer] Electronic PA Form   Original Claim Info75 PA REQD CALL [phone] help: [id number]

## 2017-07-25 ENCOUNTER — HOME CARE VISIT (OUTPATIENT)
Dept: HOME HEALTH SERVICES | Facility: HOME HEALTHCARE | Age: 27
End: 2017-07-25
Payer: MEDICARE

## 2017-07-25 ENCOUNTER — APPOINTMENT (OUTPATIENT)
Dept: MEDICAL GROUP | Facility: MEDICAL CENTER | Age: 27
End: 2017-07-25
Payer: MEDICARE

## 2017-07-25 ENCOUNTER — TELEPHONE (OUTPATIENT)
Dept: URGENT CARE | Facility: PHYSICIAN GROUP | Age: 27
End: 2017-07-25

## 2017-07-25 VITALS
SYSTOLIC BLOOD PRESSURE: 140 MMHG | WEIGHT: 160 LBS | DIASTOLIC BLOOD PRESSURE: 91 MMHG | BODY MASS INDEX: 28.35 KG/M2 | OXYGEN SATURATION: 98 % | TEMPERATURE: 97.8 F | HEIGHT: 63 IN | RESPIRATION RATE: 18 BRPM | HEART RATE: 93 BPM

## 2017-07-25 LAB
BACTERIA WND AEROBE CULT: NORMAL
GLUCOSE BLD-MCNC: 105 MG/DL (ref 65–99)
GLUCOSE BLD-MCNC: 113 MG/DL (ref 65–99)
GLUCOSE BLD-MCNC: 99 MG/DL (ref 65–99)
GRAM STN SPEC: NORMAL
SIGNIFICANT IND 70042: NORMAL
SITE SITE: NORMAL
SOURCE SOURCE: NORMAL

## 2017-07-25 PROCEDURE — 700102 HCHG RX REV CODE 250 W/ 637 OVERRIDE(OP): Performed by: EMERGENCY MEDICINE

## 2017-07-25 PROCEDURE — 665998 HH PPS REVENUE CREDIT

## 2017-07-25 PROCEDURE — A9270 NON-COVERED ITEM OR SERVICE: HCPCS | Performed by: PSYCHIATRY & NEUROLOGY

## 2017-07-25 PROCEDURE — 82962 GLUCOSE BLOOD TEST: CPT

## 2017-07-25 PROCEDURE — A9270 NON-COVERED ITEM OR SERVICE: HCPCS | Performed by: EMERGENCY MEDICINE

## 2017-07-25 PROCEDURE — 665999 HH PPS REVENUE DEBIT

## 2017-07-25 PROCEDURE — 700102 HCHG RX REV CODE 250 W/ 637 OVERRIDE(OP): Performed by: PSYCHIATRY & NEUROLOGY

## 2017-07-25 PROCEDURE — 304561 HCHG STAT O2

## 2017-07-25 RX ORDER — FLUOXETINE 10 MG/1
10 CAPSULE ORAL DAILY
Status: DISCONTINUED | OUTPATIENT
Start: 2017-07-26 | End: 2017-07-25 | Stop reason: HOSPADM

## 2017-07-25 RX ORDER — ZIPRASIDONE HYDROCHLORIDE 40 MG/1
160 CAPSULE ORAL
Status: DISCONTINUED | OUTPATIENT
Start: 2017-07-25 | End: 2017-07-25

## 2017-07-25 RX ORDER — BACLOFEN 10 MG/1
10 TABLET ORAL 3 TIMES DAILY
Status: DISCONTINUED | OUTPATIENT
Start: 2017-07-25 | End: 2017-07-25 | Stop reason: HOSPADM

## 2017-07-25 RX ORDER — FLUOXETINE 10 MG/1
10 CAPSULE ORAL ONCE
Status: COMPLETED | OUTPATIENT
Start: 2017-07-25 | End: 2017-07-25

## 2017-07-25 RX ORDER — OXYCODONE AND ACETAMINOPHEN 10; 325 MG/1; MG/1
1 TABLET ORAL ONCE
Status: COMPLETED | OUTPATIENT
Start: 2017-07-25 | End: 2017-07-25

## 2017-07-25 RX ORDER — TOBRAMYCIN 3 MG/ML
2 SOLUTION/ DROPS OPHTHALMIC EVERY 4 HOURS
COMMUNITY
Start: 2017-07-04 | End: 2017-08-29

## 2017-07-25 RX ORDER — ZIPRASIDONE HYDROCHLORIDE 40 MG/1
40 CAPSULE ORAL ONCE
Status: COMPLETED | OUTPATIENT
Start: 2017-07-25 | End: 2017-07-25

## 2017-07-25 RX ORDER — OXYCODONE AND ACETAMINOPHEN 10; 325 MG/1; MG/1
2 TABLET ORAL 2 TIMES DAILY
COMMUNITY
End: 2017-11-30

## 2017-07-25 RX ORDER — GABAPENTIN 300 MG/1
600 CAPSULE ORAL 3 TIMES DAILY
Status: DISCONTINUED | OUTPATIENT
Start: 2017-07-25 | End: 2017-07-25 | Stop reason: HOSPADM

## 2017-07-25 RX ORDER — OXYCODONE AND ACETAMINOPHEN 10; 325 MG/1; MG/1
2 TABLET ORAL 2 TIMES DAILY
Status: DISCONTINUED | OUTPATIENT
Start: 2017-07-25 | End: 2017-07-25 | Stop reason: HOSPADM

## 2017-07-25 RX ORDER — ZIPRASIDONE HYDROCHLORIDE 40 MG/1
160 CAPSULE ORAL ONCE
Status: COMPLETED | OUTPATIENT
Start: 2017-07-25 | End: 2017-07-25

## 2017-07-25 RX ORDER — ZIPRASIDONE HYDROCHLORIDE 40 MG/1
200 CAPSULE ORAL
Status: DISCONTINUED | OUTPATIENT
Start: 2017-07-26 | End: 2017-07-25 | Stop reason: HOSPADM

## 2017-07-25 RX ORDER — ZIPRASIDONE HYDROCHLORIDE 40 MG/1
200 CAPSULE ORAL
Status: DISCONTINUED | OUTPATIENT
Start: 2017-07-26 | End: 2017-07-25

## 2017-07-25 RX ORDER — DOXYCYCLINE HYCLATE 100 MG
100 TABLET ORAL 2 TIMES DAILY
COMMUNITY
Start: 2017-07-23 | End: 2017-08-29

## 2017-07-25 RX ADMIN — GABAPENTIN 600 MG: 300 CAPSULE ORAL at 18:45

## 2017-07-25 RX ADMIN — FLUOXETINE 10 MG: 10 CAPSULE ORAL at 07:21

## 2017-07-25 RX ADMIN — OXYCODONE HYDROCHLORIDE AND ACETAMINOPHEN 2 TABLET: 10; 325 TABLET ORAL at 18:45

## 2017-07-25 RX ADMIN — OXYCODONE HYDROCHLORIDE AND ACETAMINOPHEN 1 TABLET: 10; 325 TABLET ORAL at 05:51

## 2017-07-25 RX ADMIN — ZIPRASIDONE HCL 160 MG: 40 CAPSULE ORAL at 06:54

## 2017-07-25 RX ADMIN — ZIPRASIDONE HCL 40 MG: 40 CAPSULE ORAL at 12:09

## 2017-07-25 RX ADMIN — BACLOFEN 10 MG: 10 TABLET ORAL at 18:46

## 2017-07-25 ASSESSMENT — PAIN SCALES - GENERAL: PAINLEVEL_OUTOF10: 0

## 2017-07-25 NOTE — ED NOTES
Pt requesting to take her medications.  Pt explained ER policy.  Pt gives this nurse verbally a list of her meds as follows:    Baclofen 10mg TID  Oxycodone/acetaminophen 10/325 2 pills in the morning and 2 pills in the evening  Gabapentin 600mg TID  Geodon 160mg every evening  Aspirin 81mg every morning  Januvia 50mg every day  Sodium bicarb 650mg TID  Lasix 40mg every morning  Prozac 10mg every morning  Fentanyl patch 25mch every 3 days    Pt states she uses the [facility].    Pt requesting pain medication.  Per ERP ok to order 1 percocet 10/325 now.  ERP wants pharmacy to consult when they get here.

## 2017-07-25 NOTE — ED NOTES
"Med rec updated and complete  Allergies reviewed  Pt states \"I took my MELATONIN yesterday (7/24/2017) @ 1300, because I needed sleep\".  "

## 2017-07-25 NOTE — PSYCHIATRY
Pt evaluated at bedside, full psychiatric eval note to follow.  Pt continues to have hallucinations, will increase her geodon by 40mg for a total of 200mg PO daily. Will monitor EKG due to higher dose.  Pt is on a legal hold, psychiatry will continue to follow.
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
## 2017-07-25 NOTE — ED NOTES
"Pt medicated per MAR.  Pt sitting on walker in doorway stating she is \"scared of the big scary person in her room\".  This RN turned on all of the room lights and moved the bed to try to help pt see there is no one in her room.  Pt calmly went back to bed and covered her head up with blanket and stated \"Ill try to ignore it\".  Pt educated to call for RN if she needed assistance.  "

## 2017-07-25 NOTE — ED NOTES
Pt resting quietly in bed. NAD noted.  Respirations even and unlabored. No questions or concerns to address at this time.

## 2017-07-25 NOTE — ED NOTES
Received report form SONYA Cedillo, taking over pt care.  Pt sitting up comfortably, bed in lowered position side rails up

## 2017-07-25 NOTE — DISCHARGE PLANNING
Medical Social Work    Referral: Legal Hold    Intervention: Legal Hold Paperwork given to SW by [employer] RN: Joe (provided to AM SW)    Legal Hold Initiated: Date: 07/24/2017  Time: 1845    Legal Hold faxed: Date: 07/24/2017  Time: 2255    Patient’s Insurance Listed on Face Sheet: Medicare and Medicaid FFS    Referrals sent to: [facility] and [facility]    Plan: Patient will transfer to mental health facility once acceptance is obtained.
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
## 2017-07-25 NOTE — DISCHARGE PLANNING
S O) patient and mother laughing together, patient reports still has negative voices, and met with psych MD and are doing medication changes while awaiting placement.  Patient seen by [facility] as well and they confirmed if desired the would work with patient which would give some respite care using drop in center and perhaps additional counseling.\  A) atypical psychotic disorder  P) continue on hold [facility] information shared with patient and hand out given to mother, and [facility] aware.

## 2017-07-25 NOTE — ED PROVIDER NOTES
ED PROVIDER NOTE    Scribed for Ruth Ann Thomas D.O. by Comfort Pimentel. 7/25/2017, 10:22 AM.    This is an addendum to the note on Kristin Balderrama. For further details and full chart entry, see the previously signed ED Provider Note written by Dr. Ruff (ERP).      10:23 AM Patient reevaluated at bedside. She is resting and appears comfortable. The patient still complains of visual and auditory hallucinations. Awaiting pharmacy review of medications.      Impression:  1. 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 Acute psychosis          Comfort RODRIGUEZ (Eva), am scribing for, and in the presence of, Ruth Ann Thomas D.O..    Electronically signed by: Comfort Pimentel (Eva), 7/25/2017    Ruth Ann RODRIGUEZ D.O. personally performed the services described in this documentation, as scribed by Comfort Pimentel in my presence, and it is both accurate and complete.    The note accurately reflects work and decisions made by me.  Ruth Ann Thomas  7/25/2017  11:05 AM

## 2017-07-25 NOTE — ED NOTES
"S/W ERP.  Pt sitting in doorway refusing to go in room because \"there is a big scary person over there\".  Per ERP, ok to order geodon 160mg po and prozac 10mg once.  "

## 2017-07-25 NOTE — ED NOTES
Report received from SONYA Richards.  Pt resting quietly in bed.  NAD noted.  VS stable.  Respirations even and unlabored.  No questions or concerns to address at this time.    Pt placed in hospital gown.  All potentially dangerous objects removed from room.      1 blue belongings bag labeled and placed in ambulance bay along with 1 oxygen tank and 1 oxygen tank parkinson.  Pt was allowed to keep her glasses and rolling walker.
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
## 2017-07-26 ENCOUNTER — PATIENT OUTREACH (OUTPATIENT)
Dept: HEALTH INFORMATION MANAGEMENT | Facility: OTHER | Age: 27
End: 2017-07-26

## 2017-07-26 PROCEDURE — 665999 HH PPS REVENUE DEBIT

## 2017-07-26 PROCEDURE — 665998 HH PPS REVENUE CREDIT

## 2017-07-26 NOTE — TELEPHONE ENCOUNTER
Called and left message that culture were neg for pathologic bacteria, rec f/u PCP if not improving.

## 2017-07-26 NOTE — DISCHARGE INSTRUCTIONS
Psychosis  Psychosis refers to a severe loss of contact with reality. During a psychotic episode, a person is not able to think clearly, and his or her emotions and responses do not match up with what is actually happening. Someone may have false beliefs about what is happening or who they are (delusions). Someone may see, hear, taste, smell, or feel things that are not present (hallucinations).   Psychosis usually occurs with very serious mental health (psychiatric) conditions such as schizophrenia, bipolar disorder, or major depression. It can sometimes also be the result of drug use or certain medical conditions.  SYMPTOMS  Symptoms of a psychotic episode include:  · Delusions, such as:  ¨ Feeling excessive fear or suspicion (paranoia).  ¨ Believing something that is odd, unrealistic, or false, such as having a false belief about being someone else.  · Hallucinations.  · Disorganized thinking, such as thoughts that jump from one to another that do not make sense to others.  · Disorganized speech, such as saying things that do not make sense to others.  · Inappropriate behavior, such as talking to oneself or intruding on unfamiliar people.  DIAGNOSIS  A diagnosis of psychosis is made through an assessment by a health care provider, who will ask questions about thoughts, feelings, behavior, drug use, and medical conditions. The health care provider may also do one or more of the following:  · Physical exam.  · Blood tests.  · Brain imaging, such as a CT scan or MRI.  · Brain wave study (EEG).  The health care provider may make a referral for further evaluation by a mental health professional.  TREATMENT   Treatment depends on the cause of the psychosis. Treatment may include one or more of the following:  · Monitoring and supportive care in the emergency room or hospital.    · Taking medicines (antipsychotic medicine) to reduce symptoms and to balance chemicals in the brain.  · Treating an underlying medical  condition.  · Stopping or reducing drugs that are causing psychosis.  · Therapy and other supportive programs outside of the hospital.  HOME CARE INSTRUCTIONS  · Over-the-counter and prescription medicines should be taken only as told by the health care provider.  · The health care provider should be consulted before over-the-counter medicines, herbs, or supplements are used.  · All follow-up visits should be kept as told by the health care provider. This is important.  · A healthy lifestyle should be maintained. This includes:  ¨ Eating a healthy diet.  ¨ Getting enough sleep.  ¨ Exercising regularly.  ¨ Avoiding alcohol and recreational drugs as told by the health care provider.  SEEK MEDICAL CARE IF:  · Medicines do not seem to be helping.  · The person hears voices telling him or her to do things.  · The person continues to see, smell, or feel things that are not there.  · The person feels extremely fearful and suspicious that someone or something will harm him or her.  · The person feels unable to leave his or her house.  · The person has trouble taking care of himself or herself.  · The person experiences side effects of medicines, such as:  ¨ Changes in sleep patterns.  ¨ Dizziness.  ¨ Weight gain.  ¨ Restlessness.  ¨ Movement changes.  ¨ Muscle spasms.  ¨ Tremors.  SEEK IMMEDIATE MEDICAL CARE IF:  · Serious thoughts occur about self-harm or about hurting others.  · There are serious side effects of medicine, such as:  ¨ Swelling of the face, lips, tongue, or throat.  ¨ Fever, confusion, muscle spasms, or seizures.     This information is not intended to replace advice given to you by your health care provider. Make sure you discuss any questions you have with your health care provider.     Document Released: 06/07/2011 Document Revised: 05/03/2016 Document Reviewed: 12/22/2015  [employer] Interactive Patient Education ©2016 [employer]

## 2017-07-26 NOTE — ED NOTES
Pt discharged and IV removed.  Pt will be going home with grandma.  This has been cleared by Margarette ([facility]) and REGAN (Dr Abdul).  No prescriptions given.

## 2017-07-26 NOTE — DISCHARGE PLANNING
Alert team note:  Patient reports she is no longer having disturbing hallucinations.  She and her grandmother want to take her home.  Reviewed with her and her grandmother  mehnaz if she is having thoughts that are disturbing to her to bring her back.  Denies SI/HI.  Encouraged the patient follow up with her treatment team and continue to take her medication as prescribed. Provided resources for support, counseling and [facility] wrap around services.

## 2017-07-26 NOTE — ED PROVIDER NOTES
ED PROVIDER NOTE    Scribed for Torres Abdul M.D. by Karan Ruiz. 2017, 6:37 PM.    This is an addendum to the note on Kristin Balderrama. For further details and full chart entry, see the previously signed ED Provider Note written by Dr. Ruff ([facility]).     MEDICAL DECISION MAKIN:37 PM - Nursing staff informs me that pharmacy is managing the patient's medications at this time. She will be medicated with gabapentin 600 mg PO, baclofen 10 mg PO, and percocet  mg POx2. Patient was re-seen by [facility]. She is no longer psychotic now that she is on her medications. Patient be discharged home with primary care and psychiatric follow-up.    FINAL IMPRESSION   1. 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 Acute psychosis         Karan RODRIGUEZ (Scribe), am scribing for, and in the presence of, Torres Abdul M.D..    Electronically signed by: Karan Ruiz (Carolibe), 2017    Torres RODRIGUEZ M.D. personally performed the services described in this documentation, as scribed by Karan Ruiz in my presence, and it is both accurate and complete.    The note accurately reflects work and decisions made by me.  Torres Abdul  2017  8:01 PM

## 2017-07-27 ENCOUNTER — HOME CARE VISIT (OUTPATIENT)
Dept: HOME HEALTH SERVICES | Facility: HOME HEALTHCARE | Age: 27
End: 2017-07-27
Payer: MEDICARE

## 2017-07-27 ENCOUNTER — TELEPHONE (OUTPATIENT)
Dept: BEHAVIORAL HEALTH | Facility: PHYSICIAN GROUP | Age: 27
End: 2017-07-27

## 2017-07-27 VITALS
RESPIRATION RATE: 16 BRPM | HEART RATE: 72 BPM | SYSTOLIC BLOOD PRESSURE: 110 MMHG | DIASTOLIC BLOOD PRESSURE: 60 MMHG | TEMPERATURE: 97.2 F

## 2017-07-27 VITALS
SYSTOLIC BLOOD PRESSURE: 110 MMHG | RESPIRATION RATE: 16 BRPM | TEMPERATURE: 97.2 F | DIASTOLIC BLOOD PRESSURE: 60 MMHG | HEART RATE: 82 BPM

## 2017-07-27 PROCEDURE — 665999 HH PPS REVENUE DEBIT

## 2017-07-27 PROCEDURE — G0156 HHCP-SVS OF AIDE,EA 15 MIN: HCPCS

## 2017-07-27 PROCEDURE — G0299 HHS/HOSPICE OF RN EA 15 MIN: HCPCS

## 2017-07-27 PROCEDURE — 665998 HH PPS REVENUE CREDIT

## 2017-07-27 RX ORDER — ZIPRASIDONE HYDROCHLORIDE 80 MG/1
80 CAPSULE ORAL 2 TIMES DAILY
Qty: 60 CAP | Refills: 2 | Status: ON HOLD | OUTPATIENT
Start: 2017-07-27 | End: 2017-08-04 | Stop reason: CLARIF

## 2017-07-27 RX ORDER — ZIPRASIDONE HYDROCHLORIDE 40 MG/1
CAPSULE ORAL
Qty: 30 CAP | Refills: 2 | Status: SHIPPED | OUTPATIENT
Start: 2017-07-27 | End: 2017-08-29

## 2017-07-27 ASSESSMENT — ENCOUNTER SYMPTOMS
DIFFICULTY THINKING: 1
NAUSEA: DENIES
DEPRESSED MOOD: 1
POOR JUDGMENT: 1
VOMITING: DENIES
DEBILITATING PAIN: 1
THOUGHT CONTENT - SUSPICIOUS: 1
RESPIRATORY SYMPTOMS COMMENTS: NO CONCERNS AT THIS TIME

## 2017-07-27 NOTE — TELEPHONE ENCOUNTER
Was the patient seen in the last year in this department? Yes     Does patient have an active prescription for medications requested? Yes     Received Request Via: Patient     Patient called saying that she was recently discharged from the hospital. The doctor there increased her Geodone from 160mg to 190mg but she had left before they could give her the increased dose of medication. She is wondering if you could write the rx for her?

## 2017-07-28 ENCOUNTER — OFFICE VISIT (OUTPATIENT)
Dept: MEDICAL GROUP | Facility: MEDICAL CENTER | Age: 27
End: 2017-07-28
Payer: MEDICARE

## 2017-07-28 ENCOUNTER — TELEPHONE (OUTPATIENT)
Dept: MEDICAL GROUP | Facility: MEDICAL CENTER | Age: 27
End: 2017-07-28

## 2017-07-28 VITALS
RESPIRATION RATE: 16 BRPM | DIASTOLIC BLOOD PRESSURE: 78 MMHG | BODY MASS INDEX: 46.07 KG/M2 | OXYGEN SATURATION: 96 % | HEIGHT: 63 IN | HEART RATE: 89 BPM | TEMPERATURE: 97.9 F | WEIGHT: 260 LBS | SYSTOLIC BLOOD PRESSURE: 116 MMHG

## 2017-07-28 DIAGNOSIS — G56.02 CARPAL TUNNEL SYNDROME OF LEFT WRIST: ICD-10-CM

## 2017-07-28 DIAGNOSIS — N64.4 SORENESS BREAST: ICD-10-CM

## 2017-07-28 DIAGNOSIS — F20.89 PSYCHOSIS, SCHIZOPHRENIA, SIMPLE (HCC): Chronic | ICD-10-CM

## 2017-07-28 PROCEDURE — 665998 HH PPS REVENUE CREDIT

## 2017-07-28 PROCEDURE — 665999 HH PPS REVENUE DEBIT

## 2017-07-28 PROCEDURE — 99214 OFFICE O/P EST MOD 30 MIN: CPT | Performed by: INTERNAL MEDICINE

## 2017-07-28 NOTE — PROGRESS NOTES
CC: Follow-up multiple issues    HPI:   Kristin presents today with the following.    1. Carpal tunnel syndrome of left wrist  Complaining of pain in the wrist with electric shocks running down her middle fingers. Reports it happens intermittently in the days throughout his tendon intensity. She denies any specific injury to the area and no obvious swelling or redness.    2. Psychosis, schizophrenia, simple (HCC)  She's been in the ER multiple times taking pills inappropriately as well as psychotic breaks. She has been in contact with her psychiatrist and has appointment pending.    3. Soreness breast  Seen in the ER for lesion on her right breast that's not to be a spider bite currently on antibiotics. She is complaining of left breast pain and tenderness and a lump, approximately 5:00. No drainage or redness to that breast.      Patient Active Problem List    Diagnosis Date Noted   • Paraparesis of both lower limbs (CMS-HCC) 01/24/2017     Priority: High   • Sinus tachycardia 10/31/2013     Priority: High   • Chronic inflammatory arthritis 05/23/2016     Priority: Medium   • Depression 10/28/2016     Priority: Low   • Schizophrenia (CMS-HCC) 10/27/2016     Priority: Low   • Psychosis, schizophrenia, simple (HCC) 09/29/2016     Priority: Low     Class: Chronic   • TONYA on CPAP 03/07/2016     Priority: Low   • Acquired hypothyroidism 11/23/2015     Priority: Low   • PCOS (polycystic ovarian syndrome) 11/23/2015     Priority: Low   • Progressive focal motor weakness 06/28/2015     Priority: Low   • Fatty liver disease, nonalcoholic 01/19/2015     Priority: Low   • Anxiety 12/16/2014     Priority: Low   • Knee pain, right 02/13/2014     Priority: Low   • Neurogenic bladder 04/02/2011     Priority: Low   • Chronic UTI 09/18/2010     Priority: Low   • Multiple personality disorder 09/18/2010     Priority: Low   • IGT (impaired glucose tolerance) 07/06/2017   • Rash 06/09/2017   • Hashimoto's encephalopathy 05/17/2017   •  UTI (urinary tract infection) 05/13/2017   • Weakness 05/13/2017   • Enterococcus faecalis infection 05/13/2017   • Fall at home 05/13/2017   • Dysuria 05/09/2017   • On home oxygen therapy 04/15/2017   • Chest pain 03/30/2017   • Weakness of right upper extremity 02/23/2017   • Leg weakness, bilateral 02/22/2017   • Chronic suprapubic catheter (CMS-HCC) 02/16/2017   • Weakness 01/22/2017   • Leg weakness 01/04/2017   • Hypovitaminosis D 11/29/2016   • HTN (hypertension) 11/01/2016   • Chronic pain syndrome 10/27/2016   • Bowel and bladder incontinence 10/27/2016   • Galactorrhea 07/22/2016   • Elevated sedimentation rate 06/27/2016   • Weakness of both lower extremities 06/22/2016   • Vitamin D deficiency 05/21/2016   • Morbidly obese (CMS-HCC) 03/07/2016   • Conversion disorder 03/07/2016   • Scoliosis 03/07/2016   • GERD (gastroesophageal reflux disease) 03/07/2016   • Peripheral neuropathy (CMS-HCC) 03/06/2016   • H/O prior ablation treatment 02/10/2016       Current Outpatient Prescriptions   Medication Sig Dispense Refill   • ziprasidone (GEODON) 40 MG Cap Take 190 mg by mouth every day. Geodon for hallucinations/schizophrenia     • ziprasidone (GEODON) 40 MG Cap One cap by mouth at noon after meal. 30 Cap 2   • ziprasidone (GEODON) 80 MG Cap Take 1 Cap by mouth 2 Times a Day. 60 Cap 2   • oxycodone-acetaminophen (PERCOCET-10)  MG Tab Take 2 Tabs by mouth 2 Times a Day. Indications: Pain     • doxycycline (VIBRAMYCIN) 100 MG Tab Take 100 mg by mouth 2 times a day. Pt started on 7/23/2017 for 10 day course for cellulitis of breast     • mupirocin (BACTROBAN) 2 % Ointment Apply 1 Application to affected area(s) every day. Pt started on 7/23/2017 for cellulitis of breast     • tobramycin (TOBREX) 0.3 % Solution ophthalmic solution Place 2 Drops in both eyes every 4 hours. Pt started on 7/4/2017 for 7 day course for ear infection.     • sitagliptin (JANUVIA) 100 MG Tab Take 1 Tab by mouth every day. 30 Tab 6    • cefdinir (OMNICEF) 300 MG Cap Take 300 mg by mouth 2 times a day. Pt started on 7/4/2017 for 7 day course for ear infection.  Indications: Acute Infection of the Middle Ear     • ziprasidone (GEODON) 80 MG Cap TAKE 2 CAPSULES BY MOUTH NIGHTLY AT BEDTIME 60 Cap 5   • GRALISE 600 MG Tab Take 600 mg by mouth 3 times a day.     • nitroGLYCERIN (NITROSTAT) 0.3 MG SL tablet PLACE 1 TABLET UNDER TONGUE IF NEEDED FOR CHEST PAIN EVERY 5 MINUTES FOR 3 DOSES AS DIRECTED  0   • Mirabegron ER (MYRBETRIQ) 25 MG TABLET SR 24 HR Take 1 Tab by mouth every day.     • lactobacillus granules (LACTINEX/FLORANEX) Pack Take 1 Packet by mouth 3 times a day, with meals.     • levothyroxine (SYNTHROID) 75 MCG Tab Take 75 mcg by mouth Every morning on an empty stomach.     • lactulose 10 GM/15ML Solution Take 10 g by mouth 2 times a day as needed (For constipation).     • furosemide (LASIX) 40 MG Tab Take 40 mg by mouth every day.     • Cranberry 400 MG Tab Take 2 Tabs by mouth every day.     • promethazine (PHENERGAN) 25 MG Tab Take 1 Tab by mouth every 6 hours as needed for Nausea/Vomiting. 30 Tab 6   • aspirin EC (ECOTRIN) 81 MG Tablet Delayed Response Take 1 Tab by mouth every day. 30 Tab 6   • baclofen (LIORESAL) 10 MG Tab Take 1 Tab by mouth 3 times a day. 90 Tab 6   • ivabradine (CORLANOR) 5 MG Tab tablet Take 1 Tab by mouth 2 times a day, with meals. 60 Tab 6   • fluoxetine (PROZAC) 10 MG Cap TAKE ONE CAPSULE BY MOUTH ONCE A DAY 30 Cap 2   • Melatonin 5 MG Tab Take 10 mg by mouth every bedtime.     • fentanyl (DURAGESIC) 25 MCG/HR PATCH 72 HR Apply 1 Patch to skin as directed every 72 hours.     • vitamin D, Ergocalciferol, (DRISDOL) 89286 UNITS Cap capsule Take 50,000 Units by mouth every Friday.     • cyanocobalamin (HM VITAMIN B12) 500 MCG tablet Take 500 mcg by mouth 2 times a day.     • sodium bicarbonate 325 MG Tab Take 2 Tabs by mouth 3 times a day.       No current facility-administered medications for this visit.  "        Allergies as of 07/28/2017 - Joe as Reviewed 07/28/2017   Allergen Reaction Noted   • Cefdinir Shortness of Breath and Itching 03/01/2016   • Depakote [divalproex sodium] Unspecified 06/14/2010   • Abilify Unspecified 01/17/2013   • Amitriptyline Unspecified 10/31/2013   • Amoxicillin Rash 09/18/2010   • Ciprofloxacin Rash 12/17/2009   • Clindamycin Nausea 02/02/2011   • Ees [erythromycin] Vomiting and Nausea 08/28/2010   • Flagyl [metronidazole hcl] Unspecified 03/31/2011   • Flomax [tamsulosin hydrochloride] Swelling 09/24/2009   • Metformin Unspecified 07/23/2013   • Sulfa drugs Hives and Rash 09/18/2010   • Tape Rash 08/15/2012   • Vancomycin Itching 07/10/2016   • Cephalexin [keflex] Rash 01/01/2017   • Erythromycin Rash 03/30/2017   • Levofloxacin Unspecified 10/27/2016   • Metronidazole Rash 03/30/2017   • Valproic acid Rash 03/30/2017        ROS: As per HPI.    /78 mmHg  Pulse 89  Temp(Src) 36.6 °C (97.9 °F)  Resp 16  Ht 1.6 m (5' 2.99\")  Wt 117.935 kg (260 lb)  BMI 46.07 kg/m2  SpO2 96%    Physical Exam:  Gen:         Alert and oriented, No apparent distress.  Neck:        No Lymphadenopathy or Bruits.  Lungs:     Clear to auscultation bilaterally  CV:          Regular rate and rhythm. No murmurs, rubs or gallops.               Ext:          No clubbing, cyanosis, edema.      Assessment and Plan.   26 y.o. female with the following issues.    1. Carpal tunnel syndrome of left wrist  Symptoms consistent with carpal tunnel have recommended splinting.    2. Psychosis, schizophrenia, simple (HCC)  Yury to keep her appointments with psychiatry.    3. Soreness breast  Breast exam no obvious lump but she has pain with palpation of that area have ordered imaging.  - MA-DIAGNOSTIC DIGITAL MAMMO-UNILAT LEFT; Future  - US-BREAST COMPLETE-LEFT; Future        "

## 2017-07-28 NOTE — TELEPHONE ENCOUNTER
1. Caller Name: Kristin Balderrama                                           Call Back Number: [phone] (home)         Patient approves a detailed voicemail message: N\A    Patient said she is having trouble swallowing her pills and wanted to know if it would be okay to crush all her pills before taking them? Please advise

## 2017-07-28 NOTE — MR AVS SNAPSHOT
"        Kristin Balderrama   2017 9:20 AM   Office Visit   MRN: [id number]    Department:  [address] [place] [address]   Dept Phone:  [phone]    Description:  Female : 1990   Provider:  Torres Brody M.D.           Reason for Visit     Hospital Follow-up           Allergies as of 2017     Allergen Noted Reactions    Cefdinir 2016   Shortness of Breath, Itching    Tolerated 17    Depakote [Divalproex Sodium] 2010   Unspecified    Muscle spasms/muscle pain and weakness      Abilify 2013   Unspecified    Headaches/muscle twitching      Amitriptyline 10/31/2013   Unspecified    Headaches      Amoxicillin 2010   Rash    Pt states \"I get a rash\".      Ciprofloxacin 2009   Rash    Pt states \"I get a rash\".      Clindamycin 2011   Nausea    Even with food      Ees [Erythromycin] 2010   Vomiting, Nausea    Flagyl [Metronidazole Hcl] 2011   Unspecified    \"eye problems\"      Flomax [Tamsulosin Hydrochloride] 2009   Swelling    Metformin 2013   Unspecified    Increased lactic acid       Sulfa Drugs 2010   Hives, Rash    RXN=since childhood    Tape 08/15/2012   Rash    Tears skin off   coban with Tegaderm tape ok  RXN=ongoing    Vancomycin 07/10/2016   Itching    Pt becomes flushed in face and chest.   RXN=7/10/16    Cephalexin [Keflex] 2017   Rash    Pt states she gets a rash with this medication    Erythromycin 2017   Rash    .    Levofloxacin 10/27/2016   Unspecified    Leg muscle cramps    Metronidazole 2017   Rash    .    Valproic Acid 2017   Rash    .      You were diagnosed with     Carpal tunnel syndrome of left wrist   [172587]       Psychosis, schizophrenia, simple (CMS-HCC)   [417968]       Soreness breast   [511626]         Vital Signs     Blood Pressure Pulse Temperature Respirations Height Weight    116/78 mmHg 89 36.6 °C (97.9 °F) 16 1.6 m (5' 2.99\") 117.935 kg (260 lb)    Body Mass Index Oxygen " Saturation Smoking Status             46.07 kg/m2 96% Never Smoker          Basic Information     Date Of Birth Sex Race Ethnicity Preferred Language    1990 Female White Non- English      Your appointments     Jul 31, 2017 To Be Determined   AIDE-HH-HOME VISIT with Karla Marquez C.N.A.   [facility] (--)    [address]  [place] [address]   [phone]            Aug 02, 2017 To Be Determined   AIDE-HH-HOME VISIT with [facility] A[facility]   [facility] (--)    [address]  [place] [address]   [phone]            Aug 04, 2017  8:00 AM   SN-HH-OASIS DISCHARGE with Sinai Hooker R.N.   [facility] (--)    [address]  [place] [address]   [phone]            Aug 04, 2017 To Be Determined   AIDE-HH-HOME VISIT with Karla Marquez C.N.A.   [facility] (--)    [address]  [place] [address]   [phone]            Aug 07, 2017 To Be Determined   AIDE-HH-HOME VISIT with Karla Marquez C.N.A.   [facility] (--)    [address]  [place] [address]   [phone]            Aug 09, 2017 To Be Determined   AIDE-HH-HOME VISIT with [facility] A[facility]   [facility] (--)    [address]  [place] [address]   [phone]            Aug 09, 2017 10:00 AM   MA DX30 with RK COOPER MG 1   [facility]    [address]  [place] [address]   [phone]            Aug 11, 2017 10:00 AM   Follow Up Visit with Sandoval Fox M.D.   [facility] (--)    [address] [facility], [address]  [place] [id number] [phone]           You will be receiving a confirmation call a few days before your appointment from our automated call confirmation system.            Aug 11, 2017 To Be Determined   AIDE-HH-HOME VISIT with Karla Marquez C.N.A.   [facility] (--)    [facility] [address]  [place] [address]   [phone]            Aug 14, 2017 To  Be Determined   AIDE-HH-HOME VISIT with Karla Marquez C.N.A.   [facility] (--)    [address]  [place] [address]   [phone]            Aug 16, 2017 To Be Determined   [facility]HH-HOME VISIT [facility]   [facility] (--)    [address]  [place] [address]   [phone]            Aug 18, 2017 To Be Determined   AIDE-HH-HOME VISIT with Karla Marquez C.N.A.   [facility] (--)    [address]  [place] [address]   [phone]            Aug 21, 2017 To Be Determined   AIDE-HH-HOME VISIT with Karla Marquez C.N.A.   [facility] (--)    [address]  [place] [address]   [phone]            Aug 23, 2017 To Be Determined   [facility]   [facility] (--)    [address]  [place] [address]   [phone]            Aug 24, 2017 11:30 AM   Follow Up Med Management with Ronny Burnette M.D.   [facility] [address] [place] [address])    [address] [place] [address]  [address]  [place] [address]   [phone]            Aug 25, 2017 To Be Determined   AIDE-HH-HOME VISIT with Karla Marquez C.N.A.   [facility] (--)    [address]  [place] [address]   [phone]            Aug 29, 2017  8:00 AM   Individual Therapy with Blanca Brantley L.C.S.W.   [facility])    [address]  [address]  [place] [id number] [phone]            Sep 18, 2017  8:00 AM   Individual Therapy with Blanca Brantley L.C.S.W.   [facility])    [address]  [address]  [place] [address] [phone]            Oct 04, 2017 10:00 AM   Individual Therapy with Blanca Brantley L.C.S.W.   [facility] ([facility])    [address] [facility]  [address]  [place] [id number] [phone]            Oct 06, 2017  7:20 AM   Established Patient with Torres Brody M.D.   [facility] [address]    [address]  [facility] [address]  [place] [address]   [phone]             You will be receiving a confirmation call a few days before your appointment from our automated call confirmation system.              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 Low  9/18/2010 - Present    Neurogenic bladder N31.9 Low  4/2/2011 - Present    Sinus tachycardia (Chronic) R00.0 High  10/31/2013 - Present    Knee pain, right M25.561 Low  2/13/2014 - Present    Anxiety F41.9 Low  12/16/2014 - Present    Fatty liver disease, nonalcoholic K76.0 Low  1/19/2015 - Present    Progressive focal motor weakness R53.1 Low  6/28/2015 - Present    Acquired hypothyroidism E03.9 Low  11/23/2015 - Present    PCOS (polycystic ovarian syndrome) E28.2 Low  11/23/2015 - Present    H/O prior ablation treatment Z98.890   2/10/2016 - Present    Peripheral neuropathy (CMS-HCC) (Chronic) G62.9   3/6/2016 - Present    TONYA on CPAP G47.33, Z99.89 Low  3/7/2016 - Present    Morbidly obese (CMS-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   3/7/2016 - Present    Scoliosis M41.9   3/7/2016 - Present    GERD (gastroesophageal reflux disease) (Chronic) K21.9   3/7/2016 - Present    Vitamin D deficiency E55.9   5/21/2016 - Present    Chronic inflammatory arthritis (Chronic) M19.90 Medium  5/23/2016 - Present    Weakness of both lower extremities R29.898   6/22/2016 - Present    Elevated sedimentation rate R70.0   6/27/2016 - Present    Galactorrhea O92.6   7/22/2016 - Present    Psychosis, schizophrenia, simple (HCC) (Chronic) F20.89 Low Chronic 9/29/2016 - Present    Schizophrenia (CMS-HCC) F20.9 Low  10/27/2016 - Present    Chronic pain syndrome (Chronic) G89.4   10/27/2016 - Present    Bowel and bladder incontinence R32, R15.9   10/27/2016 - Present    Depression F32.9 Low  10/28/2016 - Present    HTN (hypertension) (Chronic) I10   11/1/2016 - Present    Hypovitaminosis D E55.9    11/29/2016 - Present    Leg weakness R29.898   1/4/2017 - Present    Weakness R53.1   1/22/2017 - Present    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 High  1/24/2017 - Present    Chronic suprapubic catheter (CMS-HCC) (Chronic) Z93.59   2/16/2017 - Present    Leg weakness, bilateral R29.898   2/22/2017 - Present    Weakness of right upper extremity R29.898   2/23/2017 - Present    Chest pain R07.9   3/30/2017 - Present    On home oxygen therapy (Chronic) Z99.81   4/15/2017 - Present    Dysuria R30.0   5/9/2017 - Present    UTI (urinary tract infection) N39.0   5/13/2017 - Present    Weakness R53.1   5/13/2017 - Present    Enterococcus faecalis infection B95.2   5/13/2017 - Present    Fall at home W19.XXXA, Y92.099   5/13/2017 - Present    Hashimoto's encephalopathy E06.3, G93.49   5/17/2017 - Present    Rash R21   6/9/2017 - Present    IGT (impaired glucose tolerance) R73.02   7/6/2017 - Present      Health Maintenance        Date Due Completion Dates    IMM VARICELLA (CHICKENPOX) VACCINE (1 of 2 - 2 Dose Adolescent Series) 10/13/2002 ---    IMM INFLUENZA (1) 9/1/2017 10/5/2016, 9/5/2013, 12/7/2011, 11/7/2011    A1C SCREENING 1/6/2018 7/6/2017, 6/28/2017, 4/6/2017, 12/7/2016, 10/14/2016, 3/9/2016, 9/5/2014    IMM HEP A VACCINE (2 of 2 - Standard Series) 1/18/2018 7/18/2017    URINE ACR / MICROALBUMIN 5/5/2018 5/5/2017, 3/7/2017, 12/7/2016, 10/14/2016, 7/28/2016    RETINAL SCREENING 5/23/2018 5/23/2017    DIABETES MONOFILAMENT / LE EXAM 5/23/2018 5/23/2017    FASTING LIPID PROFILE 7/6/2018 7/6/2017, 3/7/2017, 2/24/2017, 12/7/2016, 10/28/2016, 10/14/2016, 3/21/2014    SERUM CREATININE 7/24/2018 7/24/2017, 7/19/2017, 7/6/2017, 6/21/2017, 5/18/2017, 5/17/2017, 5/14/2017, 5/13/2017, 5/5/2017, 4/14/2017, 4/13/2017, 4/12/2017, 4/5/2017, 3/27/2017, 3/18/2017, 3/17/2017, 3/16/2017, 3/11/2017, 3/10/2017, 3/9/2017, 3/7/2017, 2/25/2017, 2/24/2017, 2/23/2017, 2/22/2017, 1/30/2017, 1/27/2017, 1/25/2017, 1/22/2017,  "1/22/2017, 1/18/2017, 1/18/2017, 12/7/2016, 12/6/2016, 11/4/2016, 11/3/2016, 11/2/2016, 11/1/2016, 10/28/2016, 10/27/2016, 10/14/2016, 10/4/2016, 10/3/2016, 9/29/2016, 8/16/2016, 7/28/2016, 7/28/2016, 7/10/2016, 6/25/2016, 6/23/2016, 6/22/2016, 6/7/2016, 5/18/2016, 4/19/2016, 4/3/2016, 3/30/2016, 3/29/2016, 3/28/2016, 3/14/2016, 3/10/2016, 3/9/2016, 3/7/2016, 3/6/2016, 2/15/2016, 2/12/2016, 1/29/2016, 1/28/2016, 1/26/2016, 11/28/2015, 11/27/2015, 11/23/2015, 11/22/2015, 7/9/2015, 7/8/2015, 6/27/2015, 4/14/2015, 3/23/2015, 2/18/2015, 10/27/2014, 9/5/2014, 3/21/2014, 12/24/2013, 1/12/2013, 8/24/2012, 8/23/2012, 8/22/2012, 8/15/2012, 4/21/2012, 4/20/2012, 4/19/2012, 9/17/2011, 4/3/2011, 4/2/2011, 3/31/2011, 9/20/2010, 9/19/2010, 9/18/2010, 8/28/2010, 7/20/2010, 1/30/2007    PAP SMEAR 7/22/2019 7/22/2016 (Postponed), 2/19/2013 (N/S)    Override on 7/22/2016: Postponed (per pt was told could \"skip\" 2016)    Override on 2/19/2013: (N/S) ([place])    COLONOSCOPY 9/25/2023 9/25/2013    IMM DTaP/Tdap/Td Vaccine (8 - Td) 7/23/2027 7/23/2017, 8/6/2012, 9/18/2010, 3/3/1994, 5/17/1991, 5/10/1990, 3/23/1990, 1990            Current Immunizations     Dtap Vaccine 3/3/1994, 5/17/1991, 5/10/1990, 3/23/1990, 1990    HIB Vaccine(PEDVAX) 1/11/1991    HPV Quadrivalent Vaccine (GARDASIL) 10/27/2011, 5/27/2011, 3/1/2011    Hepatitis A Vaccine, Adult 7/18/2017  2:15 PM    Hepatitis B Vaccine Non-Recombivax (Ped/Adol) 1/28/2004, 10/28/2003, 10/29/1999    Influenza TIV (IM) 9/5/2013, 12/7/2011, 11/7/2011    Influenza Vaccine Adult HD 10/5/2016    MMR Vaccine 3/3/1994, 1/11/1991    OPV - Historical Data 3/3/1994, 5/17/1991, 5/10/1990, 3/23/1990, 1990    Pneumococcal Vaccine (PCV7) Historical Data 11/8/2015    Pneumococcal polysaccharide vaccine (PPSV-23) 1/23/2017  5:35 PM, 1/14/2017    TD Vaccine 9/18/2010  4:45 PM    Tdap Vaccine 7/23/2017, 8/6/2012      Below and/or attached are the medications your provider expects you to " take. Review all of your home medications and newly ordered medications with your provider and/or pharmacist. Follow medication instructions as directed by your provider and/or pharmacist. Please keep your medication list with you and share with your provider. Update the information when medications are discontinued, doses are changed, or new medications (including over-the-counter products) are added; and carry medication information at all times in the event of emergency situations     Allergies:  CEFDINIR - Shortness of Breath,Itching     DEPAKOTE - Unspecified     ABILIFY - Unspecified     AMITRIPTYLINE - Unspecified     AMOXICILLIN - Rash     CIPROFLOXACIN - Rash     CLINDAMYCIN - Nausea     EES - Vomiting,Nausea     FLAGYL - Unspecified     FLOMAX - Swelling     METFORMIN - Unspecified     SULFA DRUGS - Hives,Rash     TAPE - Rash     VANCOMYCIN - Itching     CEPHALEXIN - Rash     ERYTHROMYCIN - Rash     LEVOFLOXACIN - Unspecified     METRONIDAZOLE - Rash     VALPROIC ACID - Rash               Medications  Valid as of: July 28, 2017 -  9:35 AM    Generic Name Brand Name Tablet Size Instructions for use    Aspirin (Tablet Delayed Response) ECOTRIN 81 MG Take 1 Tab by mouth every day.        Baclofen (Tab) LIORESAL 10 MG Take 1 Tab by mouth 3 times a day.        Cefdinir (Cap) OMNICEF 300 MG Take 300 mg by mouth 2 times a day. Pt started on 7/4/2017 for 7 day course for ear infection.  Indications: Acute Infection of the Middle Ear        Cranberry (Tab) Cranberry 400 MG Take 2 Tabs by mouth every day.        Cyanocobalamin (Tab) vitamin b12 500 MCG Take 500 mcg by mouth 2 times a day.        Doxycycline Hyclate (Tab) VIBRAMYCIN 100 MG Take 100 mg by mouth 2 times a day. Pt started on 7/23/2017 for 10 day course for cellulitis of breast        Ergocalciferol (Cap) DRISDOL 42946 UNITS Take 50,000 Units by mouth every Friday.        FentaNYL (PATCH 72 HR) DURAGESIC 25 MCG/HR Apply 1 Patch to skin as directed every  72 hours.        FLUoxetine HCl (Cap) PROZAC 10 MG TAKE ONE CAPSULE BY MOUTH ONCE A DAY        Furosemide (Tab) LASIX 40 MG Take 40 mg by mouth every day.        Gabapentin (Once-Daily) (Tab) GRALISE 600 MG Take 600 mg by mouth 3 times a day.        Ivabradine HCl (Tab) CORLANOR 5 MG Take 1 Tab by mouth 2 times a day, with meals.        Lactobacillus (Pack) LACTINEX/FLORANEX  Take 1 Packet by mouth 3 times a day, with meals.        Lactulose (Solution) lactulose 10 GM/15ML Take 10 g by mouth 2 times a day as needed (For constipation).        Levothyroxine Sodium (Tab) SYNTHROID 75 MCG Take 75 mcg by mouth Every morning on an empty stomach.        Melatonin (Tab) Melatonin 5 MG Take 10 mg by mouth every bedtime.        Mirabegron (TABLET SR 24 HR) Mirabegron ER 25 MG Take 1 Tab by mouth every day.        Mupirocin (Ointment) BACTROBAN 2 % Apply 1 Application to affected area(s) every day. Pt started on 7/23/2017 for cellulitis of breast        Nitroglycerin (SL Tab) NITROSTAT 0.3 MG PLACE 1 TABLET UNDER TONGUE IF NEEDED FOR CHEST PAIN EVERY 5 MINUTES FOR 3 DOSES AS DIRECTED        Oxycodone-Acetaminophen (Tab) PERCOCET-10  MG Take 2 Tabs by mouth 2 Times a Day. Indications: Pain        Promethazine HCl (Tab) PHENERGAN 25 MG Take 1 Tab by mouth every 6 hours as needed for Nausea/Vomiting.        SITagliptin Phosphate (Tab) JANUVIA 100 MG Take 1 Tab by mouth every day.        Sodium Bicarbonate (Tab) sodium bicarbonate 325 MG Take 2 Tabs by mouth 3 times a day.        Tobramycin (Solution) TOBREX 0.3 % Place 2 Drops in both eyes every 4 hours. Pt started on 7/4/2017 for 7 day course for ear infection.        Ziprasidone HCl (Cap) GEODON 80 MG TAKE 2 CAPSULES BY MOUTH NIGHTLY AT BEDTIME        Ziprasidone HCl (Cap) GEODON 40 MG Take 190 mg by mouth every day. Geodon for hallucinations/schizophrenia        Ziprasidone HCl (Cap) GEODON 40 MG One cap by mouth at noon after meal.        Ziprasidone HCl (Cap) GEODON 80  MG Take 1 Cap by mouth 2 Times a Day.        .                 Medicines prescribed today were sent to:     [id number] - [place], [place] - [address]    [address] [place] [address]    Phone: [phone] Fax: [phone]    Open 24 Hours?: No      Medication refill instructions:       If your prescription bottle indicates you have medication refills left, it is not necessary to call your provider’s office. Please contact your pharmacy and they will refill your medication.    If your prescription bottle indicates you do not have any refills left, you may request refills at any time through one of the following ways: The online [employer] system (except Urgent Care), by calling your provider’s office, or by asking your pharmacy to contact your provider’s office with a refill request. Medication refills are processed only during regular business hours and may not be available until the next business day. Your provider may request additional information or to have a follow-up visit with you prior to refilling your medication.   *Please Note: Medication refills are assigned a new Rx number when refilled electronically. Your pharmacy may indicate that no refills were authorized even though a new prescription for the same medication is available at the pharmacy. Please request the medicine by name with the pharmacy before contacting your provider for a refill.        Your To Do List     Future Labs/Procedures Complete By [facility] LUA-DIAGNOSTIC DIGITAL MAMMO-UNILAT LEFT  As directed 8/29/2018    US-BREAST COMPLETE-LEFT  As directed 7/28/2018    Scheduling Instructions:    If indicated and need in addition to mammogram.      Other Notes About Your Plan     DME:  [facility] / ph [phone] / fax [phone]              MyChart Access Code: Activation code not generated  Current [employer]or Status: Active

## 2017-07-29 PROCEDURE — 665998 HH PPS REVENUE CREDIT

## 2017-07-29 PROCEDURE — 665999 HH PPS REVENUE DEBIT

## 2017-07-30 ENCOUNTER — HOME CARE VISIT (OUTPATIENT)
Dept: HOME HEALTH SERVICES | Facility: HOME HEALTHCARE | Age: 27
End: 2017-07-30
Payer: MEDICARE

## 2017-07-30 ENCOUNTER — HOSPITAL ENCOUNTER (EMERGENCY)
Facility: MEDICAL CENTER | Age: 27
End: 2017-07-30
Attending: EMERGENCY MEDICINE
Payer: MEDICARE

## 2017-07-30 VITALS
RESPIRATION RATE: 14 BRPM | BODY MASS INDEX: 46.02 KG/M2 | WEIGHT: 259.7 LBS | DIASTOLIC BLOOD PRESSURE: 96 MMHG | HEART RATE: 68 BPM | SYSTOLIC BLOOD PRESSURE: 145 MMHG | HEIGHT: 63 IN | TEMPERATURE: 96.9 F | OXYGEN SATURATION: 93 %

## 2017-07-30 DIAGNOSIS — R07.9 CHEST PAIN, UNSPECIFIED TYPE: ICD-10-CM

## 2017-07-30 LAB — EKG IMPRESSION: NORMAL

## 2017-07-30 PROCEDURE — 700111 HCHG RX REV CODE 636 W/ 250 OVERRIDE (IP): Performed by: EMERGENCY MEDICINE

## 2017-07-30 PROCEDURE — 99284 EMERGENCY DEPT VISIT MOD MDM: CPT

## 2017-07-30 PROCEDURE — 665999 HH PPS REVENUE DEBIT

## 2017-07-30 PROCEDURE — 96372 THER/PROPH/DIAG INJ SC/IM: CPT

## 2017-07-30 PROCEDURE — 93005 ELECTROCARDIOGRAM TRACING: CPT

## 2017-07-30 PROCEDURE — 665998 HH PPS REVENUE CREDIT

## 2017-07-30 RX ORDER — HYDROMORPHONE HYDROCHLORIDE 2 MG/ML
2 INJECTION, SOLUTION INTRAMUSCULAR; INTRAVENOUS; SUBCUTANEOUS ONCE
Status: DISCONTINUED | OUTPATIENT
Start: 2017-07-30 | End: 2017-07-30

## 2017-07-30 RX ADMIN — HYDROMORPHONE HYDROCHLORIDE 2 MG: 1 INJECTION, SOLUTION INTRAMUSCULAR; INTRAVENOUS; SUBCUTANEOUS at 12:38

## 2017-07-30 NOTE — ED AVS SNAPSHOT
7/30/2017    Kristin Balderrama  [address] [place] [address]    Dear Kristin:    [facility] wants to ensure your discharge home is safe and you or your loved ones have had all of your questions answered regarding your care after you leave the hospital.    Below is a list of resources and contact information should you have any questions regarding your hospital stay, follow-up instructions, or active medical symptoms.    Questions or Concerns Regarding… Contact   Medical Questions Related to Your Discharge  (7 days a week, 8am-5pm) Contact a Nurse Care Coordinator   [phone]   Medical Questions Not Related to Your Discharge  (24 hours a day / 7 days a week)  Contact the Nurse Health Line   [phone]    Medications or Discharge Instructions Refer to your discharge packet   or contact your [facility] Primary Care Provider   [phone]   Follow-up Appointment(s) Schedule your appointment via [employer]   or contact Scheduling [phone]   Billing Review your statement via [employer]  or contact Billing [phone]   Medical Records Review your records via [employer]   or contact Medical Records [phone]     You may receive a telephone call within two days of discharge. This call is to make certain you understand your discharge instructions and have the opportunity to have any questions answered. You can also easily access your medical information, test results and upcoming appointments via the [employer] free online health management tool. You can learn more and sign up at [employer]. For assistance setting up your [employer] account, please call [phone].    Once again, we want to ensure your discharge home is safe and that you have a clear understanding of any next steps in your care. If you have any questions or concerns, please do not hesitate to contact us, we are here for you. Thank you for choosing [facility] for your healthcare needs.    Sincerely,    Your [facility] Healthcare Team

## 2017-07-30 NOTE — ED NOTES
".  Chief Complaint   Patient presents with   • Breast Mass     Left breast pain/lump, increasing in size over last month, scheduled for mammogram and US in \"a week and a half\", pain worsening     ./100 mmHg  Pulse 80  Temp(Src) 36.1 °C (96.9 °F)  Resp 18  Ht 1.6 m (5' 2.99\")  Wt 117.8 kg (259 lb 11.2 oz)  BMI 46.02 kg/m2  SpO2 96%    BIB EMS with above complaint, seen here recently with same complaint, educated on triage process, placed in lobby, told to inform staff of any changes in condition.    "

## 2017-07-30 NOTE — ED AVS SNAPSHOT
Channelinsight Access Code: Activation code not generated  Current Channelinsight Status: Active    [employer]  A secure, online tool to manage your health information     [employer]’s Channelinsight® is a secure, online tool that connects you to your personalized health information from the privacy of your home -- day or night - making it very easy for you to manage your healthcare. Once the activation process is completed, you can even access your medical information using the Channelinsight rocio, which is available for free in the Apple Rocio store or Google Play store.     Channelinsight provides the following levels of access (as shown below):   My Chart Features   [facility] Primary Care Doctor [facility]  Specialists [facility]  Urgent  Care Non-[facility]  Primary Care  Doctor   Email your healthcare team securely and privately 24/7 X X X X   Manage appointments: schedule your next appointment; view details of past/upcoming appointments X      Request prescription refills. X      View recent personal medical records, including lab and immunizations X X X X   View health record, including health history, allergies, medications X X X X   Read reports about your outpatient visits, procedures, consult and ER notes X X X X   See your discharge summary, which is a recap of your hospital and/or ER visit that includes your diagnosis, lab results, and care plan. X X       How to register for Channelinsight:  1. Go to  [email or web address].  2. Click on the Sign Up Now box, which takes you to the New Member Sign Up page. You will need to provide the following information:  a. Enter your Channelinsight Access Code exactly as it appears at the top of this page. (You will not need to use this code after you’ve completed the sign-up process. If you do not sign up before the expiration date, you must request a new code.)   b. Enter your date of birth.   c. Enter your home email address.   d. Click Submit, and follow the next screen’s instructions.  3. Create a Channelinsight ID. This will  be your [employer] login ID and cannot be changed, so think of one that is secure and easy to remember.  4. Create a [employer] password. You can change your password at any time.  5. Enter your Password Reset Question and Answer. This can be used at a later time if you forget your password.   6. Enter your e-mail address. This allows you to receive e-mail notifications when new information is available in [employer].  7. Click Sign Up. You can now view your health information.    For assistance activating your [employer] account, call [phone]

## 2017-07-30 NOTE — DISCHARGE INSTRUCTIONS
Chest Wall Pain  Chest wall pain is pain in or around the bones and muscles of your chest. It may take up to 6 weeks to get better. It may take longer if you must stay physically active in your work and activities.   CAUSES   Chest wall pain may happen on its own. However, it may be caused by:  · A viral illness like the flu.  · Injury.  · Coughing.  · Exercise.  · Arthritis.  · Fibromyalgia.  · Shingles.  HOME CARE INSTRUCTIONS   · Avoid overtiring physical activity. Try not to strain or perform activities that cause pain. This includes any activities using your chest or your abdominal and side muscles, especially if heavy weights are used.  · Put ice on the sore area.  ¨ Put ice in a plastic bag.  ¨ Place a towel between your skin and the bag.  ¨ Leave the ice on for 15-20 minutes per hour while awake for the first 2 days.  · Only take over-the-counter or prescription medicines for pain, discomfort, or fever as directed by your caregiver.  SEEK IMMEDIATE MEDICAL CARE IF:   · Your pain increases, or you are very uncomfortable.  · You have a fever.  · Your chest pain becomes worse.  · You have new, unexplained symptoms.  · You have nausea or vomiting.  · You feel sweaty or lightheaded.  · You have a cough with phlegm (sputum), or you cough up blood.  MAKE SURE YOU:   · Understand these instructions.  · Will watch your condition.  · Will get help right away if you are not doing well or get worse.     This information is not intended to replace advice given to you by your health care provider. Make sure you discuss any questions you have with your health care provider.     Document Released: 12/18/2006 Document Revised: 03/11/2013 Document Reviewed: 03/14/2016  [employer] Interactive Patient Education ©2016 [employer]

## 2017-07-30 NOTE — ED AVS SNAPSHOT
Home Care Instructions                                                                                                                Kristin Balderrama   MRN: [id number]    Department:  [facility], Emergency Dept   Date of Visit:  7/30/2017            [facility], Emergency Dept    [address] [place] [address]    Phone:  [phone]      You were seen by     John Mann M.D.      Your Diagnosis Was     Chest pain, unspecified type     R07.9       These are the medications you received during your hospitalization from 07/30/2017 1051 to 07/30/2017 1258     Date/Time Order Dose Route Action    07/30/2017 1238 HYDROmorphone (DILAUDID) injection 2 mg 2 mg Intramuscular Given      Follow-up Information     1. Follow up with [facility], Emergency Dept.    Specialty:  Emergency Medicine    Why:  If symptoms worsen    Contact information    [address] [place] [id number] [phone]      Medication Information     Review all of your home medications and newly ordered medications with your primary doctor and/or pharmacist as soon as possible. Follow medication instructions as directed by your doctor and/or pharmacist.     Please keep your complete medication list with you and share with your physician. Update the information when medications are discontinued, doses are changed, or new medications (including over-the-counter products) are added; and carry medication information at all times in the event of emergency situations.               Medication List      ASK your doctor about these medications        Instructions    Morning Afternoon Evening Bedtime    aspirin EC 81 MG Tbec   Commonly known as:  ECOTRIN        Take 1 Tab by mouth every day.   Dose:  81 mg                        baclofen 10 MG Tabs   Commonly known as:  LIORESAL        Take 1 Tab by mouth 3 times a day.   Dose:  10 mg                        cefdinir 300 MG  Caps   Commonly known as:  OMNICEF        Take 300 mg by mouth 2 times a day. Pt started on 7/4/2017 for 7 day course for ear infection.  Indications: Acute Infection of the Middle Ear   Dose:  300 mg                        Cranberry 400 MG Tabs        Take 2 Tabs by mouth every day.   Dose:  2 Tab                        doxycycline 100 MG Tabs   Commonly known as:  VIBRAMYCIN        Take 100 mg by mouth 2 times a day. Pt started on 7/23/2017 for 10 day course for cellulitis of breast   Dose:  100 mg                        fentanyl 25 MCG/HR Pt72   Commonly known as:  DURAGESIC        Apply 1 Patch to skin as directed every 72 hours.   Dose:  1 Patch                        fluoxetine 10 MG Caps   Commonly known as:  PROZAC        TAKE ONE CAPSULE BY MOUTH ONCE A DAY                        furosemide 40 MG Tabs   Commonly known as:  LASIX        Take 40 mg by mouth every day.   Dose:  40 mg                        GRALISE 600 MG Tabs   Generic drug:  Gabapentin (Once-Daily)        Take 600 mg by mouth 3 times a day.   Dose:  600 mg                        HM VITAMIN B12 500 MCG tablet   Generic drug:  cyanocobalamin        Take 500 mcg by mouth 2 times a day.   Dose:  500 mcg                        ivabradine 5 MG Tabs tablet   Commonly known as:  CORLANOR        Take 1 Tab by mouth 2 times a day, with meals.   Dose:  5 mg                        lactobacillus granules Pack        Take 1 Packet by mouth 3 times a day, with meals.   Dose:  1 Packet                        lactulose 10 GM/15ML Soln        Take 10 g by mouth 2 times a day as needed (For constipation).   Dose:  10 g                        levothyroxine 75 MCG Tabs   Commonly known as:  SYNTHROID        Take 75 mcg by mouth Every morning on an empty stomach.   Dose:  75 mcg                        Melatonin 5 MG Tabs        Doctor's comments:  Takes two tabs at Bedtime (10 mg.)   Take 10 mg by mouth every bedtime.   Dose:  10 mg                         mupirocin 2 % Oint   Commonly known as:  BACTROBAN        Apply 1 Application to affected area(s) every day. Pt started on 7/23/2017 for cellulitis of breast   Dose:  1 Application                        MYRBETRIQ 25 MG Tb24   Generic drug:  Mirabegron ER        Take 1 Tab by mouth every day.   Dose:  1 Tab                        nitroGLYCERIN 0.3 MG SL tablet   Commonly known as:  NITROSTAT        PLACE 1 TABLET UNDER TONGUE IF NEEDED FOR CHEST PAIN EVERY 5 MINUTES FOR 3 DOSES AS DIRECTED                        oxycodone-acetaminophen  MG Tabs   Commonly known as:  PERCOCET-10        Take 2 Tabs by mouth 2 Times a Day. Indications: Pain   Dose:  2 Tab                        promethazine 25 MG Tabs   Commonly known as:  PHENERGAN        Take 1 Tab by mouth every 6 hours as needed for Nausea/Vomiting.   Dose:  25 mg                        sitagliptin 100 MG Tabs   Commonly known as:  JANUVIA        Take 1 Tab by mouth every day.   Dose:  50 mg                        sodium bicarbonate 325 MG Tabs        Take 2 Tabs by mouth 3 times a day.   Dose:  650 mg                        tobramycin 0.3 % Soln ophthalmic solution   Commonly known as:  TOBREX        Place 2 Drops in both eyes every 4 hours. Pt started on 7/4/2017 for 7 day course for ear infection.   Dose:  2 Drop                        vitamin D (Ergocalciferol) 27094 UNITS Caps capsule   Commonly known as:  DRISDOL        Take 50,000 Units by mouth every Friday.   Dose:  55713 Units                        * ziprasidone 80 MG Caps   Commonly known as:  GEODON        TAKE 2 CAPSULES BY MOUTH NIGHTLY AT BEDTIME                        * ziprasidone 40 MG Caps   Commonly known as:  GEODON        Take 190 mg by mouth every day. Geodon for hallucinations/schizophrenia   Dose:  190 mg                        * ziprasidone 40 MG Caps   Commonly known as:  GEODON        One cap by mouth at noon after meal.                        * ziprasidone 80 MG Caps   Commonly  known as:  GEODON        Take 1 Cap by mouth 2 Times a Day.   Dose:  80 mg                        * Notice:  This list has 4 medication(s) that are the same as other medications prescribed for you. Read the directions carefully, and ask your doctor or other care provider to review them with you.            Procedures and tests performed during your visit     EKG (NOW)    NURSING COMMUNICATION        Discharge Instructions       Chest Wall Pain  Chest wall pain is pain in or around the bones and muscles of your chest. It may take up to 6 weeks to get better. It may take longer if you must stay physically active in your work and activities.   CAUSES   Chest wall pain may happen on its own. However, it may be caused by:  · A viral illness like the flu.  · Injury.  · Coughing.  · Exercise.  · Arthritis.  · Fibromyalgia.  · Shingles.  HOME CARE INSTRUCTIONS   · Avoid overtiring physical activity. Try not to strain or perform activities that cause pain. This includes any activities using your chest or your abdominal and side muscles, especially if heavy weights are used.  · Put ice on the sore area.  ¨ Put ice in a plastic bag.  ¨ Place a towel between your skin and the bag.  ¨ Leave the ice on for 15-20 minutes per hour while awake for the first 2 days.  · Only take over-the-counter or prescription medicines for pain, discomfort, or fever as directed by your caregiver.  SEEK IMMEDIATE MEDICAL CARE IF:   · Your pain increases, or you are very uncomfortable.  · You have a fever.  · Your chest pain becomes worse.  · You have new, unexplained symptoms.  · You have nausea or vomiting.  · You feel sweaty or lightheaded.  · You have a cough with phlegm (sputum), or you cough up blood.  MAKE SURE YOU:   · Understand these instructions.  · Will watch your condition.  · Will get help right away if you are not doing well or get worse.     This information is not intended to replace advice given to you by your health care provider.  Make sure you discuss any questions you have with your health care provider.     Document Released: 12/18/2006 Document Revised: 03/11/2013 Document Reviewed: 03/14/2016  [employer] Interactive Patient Education ©2016 [employer]            Patient Information     Patient Information    Following emergency treatment: all patient requiring follow-up care must return either to a private physician or a clinic if your condition worsens before you are able to obtain further medical attention, please return to the emergency room.     Billing Information    At [facility], we work to make the billing process streamlined for our patients.  Our Representatives are here to answer any questions you may have regarding your hospital bill.  If you have insurance coverage and have supplied your insurance information to us, we will submit a claim to your insurer on your behalf.  Should you have any questions regarding your bill, we can be reached online or by phone as follows:  Online: You are able pay your bills online or live chat with our representatives about any billing questions you may have. We are here to help Monday - Friday from 8:00am to 7:30pm and 9:00am - 12:00pm on Saturdays.  Please visit [email or web address]  for more information.   Phone:  [phone] or [phone]    Please note that your emergency physician, surgeon, pathologist, radiologist, anesthesiologist, and other specialists are not employed by [facility] and will therefore bill separately for their services.  Please contact them directly for any questions concerning their bills at the numbers below:     Emergency Physician Services:  [phone]  [facility]:  [phone]  [facility]:  [phone]  [facility]:  [phone]    1. Your final bill may vary from the amount quoted upon discharge if all procedures are not complete at that time, or if your doctor has  additional procedures of which we are not aware. You will receive an additional bill if you return to the Emergency Department at [facility] for suture removal regardless of the facility of which the sutures were placed.     2. Please arrange for settlement of this account at the emergency registration.    3. All self-pay accounts are due in full at the time of treatment.  If you are unable to meet this obligation then payment is expected within 4-5 days.     4. If you have had radiology studies (CT, X-ray, Ultrasound, MRI), you have received a preliminary result during your emergency department visit. Please contact the radiology department [phone] to receive a copy of your final result. Please discuss the Final result with your primary physician or with the follow up physician provided.     Crisis Hotline:  [place] Crisis Hotline:  9-516-EISZZUT or 1-676.582.1512  [place] Crisis Hotline:    1-884.121.7683 or 938-079-8414         ED Discharge Follow Up Questions    1. In order to provide you with very good care, we would like to follow up with a phone call in the next few days.  May we have your permission to contact you?     YES /  NO    2. What is the best phone number to call you? (       )_____-__________    3. What is the best time to call you?      Morning  /  Afternoon  /  Evening                   Patient Signature:  ____________________________________________________________    Date:  ____________________________________________________________      Your appointments     Jul 31, 2017  3:00 PM   AIDE-HH-HOME VISIT with Karla Marquez C.N.A.   [facility] (--)    [facility] [facility]  [facility] [address]   [phone]            Aug 02, 2017 To Be Determined   AIDE-HH-HOME VISIT with Karla Marquez C.N.A.   [facility] (--)    [address]  [place] [address]   [phone]            Aug 03, 2017  3:00 PM   AIDE-HH-HOME VISIT with Karla Marquez C.N.A.   [facility]  (--)    [address]  [place] [address]   [phone]            Aug 04, 2017  8:00 AM   SN-HH-OASIS DISCHARGE with Sinai Hooker R.N.   [facility] (--)    [address]  [place] [address]   [phone]            Aug 07, 2017 To Be Determined   AIDE-HH-HOME VISIT with Karla Marquez C.N.A.   [facility] (--)    [address]  [place] [address]   [phone]            Aug 09, 2017 To Be Determined   AIDE-HH-HOME VISIT with [facility] Aide   [facility] (--)    [address]  [place] [address]   [phone]            Aug 09, 2017 10:00 AM   MA DX30 with RK COOPER MG 1   [facility] ([place])    [address] [facility] [address]  [place] [address]   [phone]            Aug 11, 2017 10:00 AM   Follow Up Visit with Sandoval Fox M.D.   [facility] (--)    [address], [address]  [place] [address]   [phone]           You will be receiving a confirmation call a few days before your appointment from our automated call confirmation system.            Aug 11, 2017 To Be Determined   AIDE-HH-HOME VISIT with Karla Marquez C.N.A.   [facility] (--)    [address]  [place] [address]   [phone]            Aug 14, 2017 To Be Determined   AIDE-HH-HOME VISIT with Karla Marquez C.N.A.   [facility] (--)    [address]  [place] [address]   [phone]            Aug 16, 2017 To Be Determined   AIDE-HH-HOME VISIT with [facility] Aide   [facility] (--)    [address]  [place] [address]   [phone]            Aug 18, 2017 To Be Determined   AIDE-HH-HOME VISIT with Karla Marquez C.N.A.   [facility] (--)    [facility] [address]  [facility] [address]   [phone]            Aug 21, 2017 To Be Determined   AIDE-HH-HOME VISIT with Karla Marquez C.N.A.   [facility] (--)    [address]  [facility] [address]   [phone]              Aug 23, 2017 To Be Determined   AIDE-HH-HOME VISIT with [facility] Aide   [facility] (--)    [address]  [place] [address]   [phone]            Aug 24, 2017 11:30 AM   Follow Up Med Management with Ronny Burnette M.D.   BEHAVIORAL HEALTH [address] [place] [address])    [address] [facility]  [address]  [place] [address]   [phone]            Aug 25, 2017 To Be Determined   AIDE-HH-HOME VISIT with Karla Marquez C.N.A.   [facility] (--)    [address]  [place] [address]   [phone]            Aug 29, 2017  8:00 AM   Individual Therapy with Blanca Brantley L.C.S.W.   BEHAVIORAL [facility]    [address]  [place] [address] [phone]            Sep 18, 2017  8:00 AM   Individual Therapy with Blanca Brantley L.C.S.W.   [facility]    [address]  [address]  [place] [address] [phone]            Oct 04, 2017 10:00 AM   Individual Therapy with Blanca Brantley L.C.S.W.   [facility]    [address]  [address]  [place] [address] [phone]            Oct 06, 2017  7:20 AM   Established Patient with Torres Brody M.D.   [facility] [address]    [address]  [place] [address]   [phone]           You will be receiving a confirmation call a few days before your appointment from our automated call confirmation system.

## 2017-07-30 NOTE — ED PROVIDER NOTES
"ER Provider Note     Scribed for John Mann M.D.  by Comfort Pimentel. 7/30/2017, 11:26 AM.    Primary Care Provider: Torres Brody M.D.  Means of Arrival: ambulance    History obtained from: Patient  History limited by: none      CHIEF COMPLAINT   Chief Complaint   Patient presents with   • Breast Mass     Left breast pain/lump, increasing in size over last month, scheduled for mammogram and US in \"a week and a half\", pain worsening       HPI   Kristin Balderrama is a 26 y.o. who presents to the emergency department for evaluation of a painful left upper breast mass which was first noted one month ago. Her breast mass has since increased in size over the past 10 days with pain radiating to her chest and armpit. She states her breast mass is 10 times larger now that it was initially. She confirms associated redness and warmth to the area. She has been using percocet and fentanyl patches with no relief of her pain. She reports having these pain medications for her history of chronic back pain and arthritis. She denies history of breastfeeding. Patient has been evaluated with mammograms in the past and has been told she has dense breast tissue. Additionally, she has a family history of breast cancer. She reports coming into the ED after her grandmother verbalized concern for an MI versus cancer. Patient took 4 aspirin prior to arrival. Patient has a history of an irregular rhythm for which she had an ablation.         REVIEW OF SYSTEMS   : left upper breast mass with pain, warmth and redness.   Dermatologic: redness to left breast.   See HPI for details. E.       PAST MEDICAL HISTORY  Past Medical History   Diagnosis Date   • Scoliosis    • Multiple personality disorder    • Chronic UTI 9/18/2010   • Heart burn    • Pain 08-15-12     back, 7/10   • History of falling    • Sinus tachycardia 10/31/2013   • Urinary incontinence    • Hypertension    • Disorder of thyroid    • Obesity    • Pneumonia 2012   • " "ASTHMA      when around smoke   • Breath shortness      with tachycardia   • Anginal syndrome      random chest pain especially with tachycardia   • Psychosis    • Arthritis      osteo   • PCOS (polycystic ovarian syndrome)    • Gynecological disorder      PCOS   • Renal disorder      \"kidney disease, stage 1\" nephrologist, Dr. Vallejo   • Arrhythmia      \"sinus tachycardia\", cariologist, Dr. 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   • Sleep apnea      CPAP \"pulmonary doctor took me off mid year 2016\"   • Mitochondrial disease    • Fall        SURGICAL HISTORY  Past Surgical History   Procedure Laterality Date   • Neuro dest facet l/s w/ig sngl  4/21/2015     Performed by Reza Tabor at SURGERY [facility]   • Recovery  1/27/2016     Procedure: CATH LAB EP STUDY WITH SINUS NODE MODIFICATION ABHINAV;  Surgeon: [facility];  Location: SURGERY PRE-POST PROC UNIT [facility];  Service:    • Katie by laparoscopy  8/29/2010     Performed by SHAYY JOHNS at SURGERY [facility]   • Lumbar fusion anterior  8/21/2012     Performed by SUSIE SAWANT at [facility]   • Other cardiac surgery  1/2016     cardiac ablation   • Tonsillectomy       tonsillectomy   • Bowel stimulator insertion  7/13/2016     Procedure: BOWEL STIMULATOR INSERTION FOR PERMANENT INTERSTIM SACRAL IMPLANT;  Surgeon: Joe Noyola M.D.;  Location: [facility];  Service:    • Gastroscopy with balloon dilatation N/A 1/4/2017     Procedure: GASTROSCOPY WITH DILATATION;  Surgeon: Torres Vargas M.D.;  Location: [facility];  Service:    • Muscle biopsy Right 1/26/2017     Procedure: MUSCLE BIOPSY - THIGH;  Surgeon: Isidro Vigil M.D.;  Location: SURGERY [facility];  Service:    • Other abdominal surgery         SOCIAL HISTORY  Social History   Substance Use Topics   • Smoking status: Never Smoker    • " Smokeless tobacco: Never Used   • Alcohol Use: No       CURRENT MEDICATIONS  Previous Medications    ASPIRIN EC (ECOTRIN) 81 MG TABLET DELAYED RESPONSE    Take 1 Tab by mouth every day.    BACLOFEN (LIORESAL) 10 MG TAB    Take 1 Tab by mouth 3 times a day.    CEFDINIR (OMNICEF) 300 MG CAP    Take 300 mg by mouth 2 times a day. Pt started on 7/4/2017 for 7 day course for ear infection.  Indications: Acute Infection of the Middle Ear    CRANBERRY 400 MG TAB    Take 2 Tabs by mouth every day.    CYANOCOBALAMIN (HM VITAMIN B12) 500 MCG TABLET    Take 500 mcg by mouth 2 times a day.    DOXYCYCLINE (VIBRAMYCIN) 100 MG TAB    Take 100 mg by mouth 2 times a day. Pt started on 7/23/2017 for 10 day course for cellulitis of breast    FENTANYL (DURAGESIC) 25 MCG/HR PATCH 72 HR    Apply 1 Patch to skin as directed every 72 hours.    FLUOXETINE (PROZAC) 10 MG CAP    TAKE ONE CAPSULE BY MOUTH ONCE A DAY    FUROSEMIDE (LASIX) 40 MG TAB    Take 40 mg by mouth every day.    GRALISE 600 MG TAB    Take 600 mg by mouth 3 times a day.    IVABRADINE (CORLANOR) 5 MG TAB TABLET    Take 1 Tab by mouth 2 times a day, with meals.    LACTOBACILLUS GRANULES (LACTINEX/FLORANEX) PACK    Take 1 Packet by mouth 3 times a day, with meals.    LACTULOSE 10 GM/15ML SOLUTION    Take 10 g by mouth 2 times a day as needed (For constipation).    LEVOTHYROXINE (SYNTHROID) 75 MCG TAB    Take 75 mcg by mouth Every morning on an empty stomach.    MELATONIN 5 MG TAB    Take 10 mg by mouth every bedtime.    MIRABEGRON ER (MYRBETRIQ) 25 MG TABLET SR 24 HR    Take 1 Tab by mouth every day.    MUPIROCIN (BACTROBAN) 2 % OINTMENT    Apply 1 Application to affected area(s) every day. Pt started on 7/23/2017 for cellulitis of breast    NITROGLYCERIN (NITROSTAT) 0.3 MG SL TABLET    PLACE 1 TABLET UNDER TONGUE IF NEEDED FOR CHEST PAIN EVERY 5 MINUTES FOR 3 DOSES AS DIRECTED    OXYCODONE-ACETAMINOPHEN (PERCOCET-10)  MG TAB    Take 2 Tabs by mouth 2 Times a Day.  "Indications: Pain    PROMETHAZINE (PHENERGAN) 25 MG TAB    Take 1 Tab by mouth every 6 hours as needed for Nausea/Vomiting.    SITAGLIPTIN (JANUVIA) 100 MG TAB    Take 1 Tab by mouth every day.    SODIUM BICARBONATE 325 MG TAB    Take 2 Tabs by mouth 3 times a day.    TOBRAMYCIN (TOBREX) 0.3 % SOLUTION OPHTHALMIC SOLUTION    Place 2 Drops in both eyes every 4 hours. Pt started on 7/4/2017 for 7 day course for ear infection.    VITAMIN D, ERGOCALCIFEROL, (DRISDOL) 54954 UNITS CAP CAPSULE    Take 50,000 Units by mouth every Friday.    ZIPRASIDONE (GEODON) 40 MG CAP    Take 190 mg by mouth every day. Geodon for hallucinations/schizophrenia    ZIPRASIDONE (GEODON) 40 MG CAP    One cap by mouth at noon after meal.    ZIPRASIDONE (GEODON) 80 MG CAP    TAKE 2 CAPSULES BY MOUTH NIGHTLY AT BEDTIME    ZIPRASIDONE (GEODON) 80 MG CAP    Take 1 Cap by mouth 2 Times a Day.       ALLERGIES     Allergies   Allergen Reactions   • Cefdinir Shortness of Breath and Itching     Tolerated 1/18/17   • Depakote [Divalproex Sodium] Unspecified     Muscle spasms/muscle pain and weakness     • Abilify Unspecified     Headaches/muscle twitching     • Amitriptyline Unspecified     Headaches     • Amoxicillin Rash     Pt states \"I get a rash\".     • Ciprofloxacin Rash     Pt states \"I get a rash\".     • Clindamycin Nausea     Even with food     • Ees [Erythromycin] Vomiting and Nausea   • Flagyl [Metronidazole Hcl] Unspecified     \"eye problems\"     • Flomax [Tamsulosin Hydrochloride] Swelling   • Metformin Unspecified     Increased lactic acid      • Sulfa Drugs Hives and Rash     RXN=since childhood   • Tape Rash     Tears skin off   coban with Tegaderm tape ok  RXN=ongoing   • Vancomycin Itching     Pt becomes flushed in face and chest.   RXN=7/10/16   • Cephalexin [Keflex] Rash     Pt states she gets a rash with this medication   • Erythromycin Rash     .   • Levofloxacin Unspecified     Leg muscle cramps   • Metronidazole Rash     .   • " "Valproic Acid Rash     .         PHYSICAL EXAM   Vital Signs: /100 mmHg  Pulse 80  Temp(Src) 36.1 °C (96.9 °F)  Resp 18  Ht 1.6 m (5' 2.99\")  Wt 117.8 kg (259 lb 11.2 oz)  BMI 46.02 kg/m2  SpO2 96%  Constitutional: Well developed, Well nourished, No acute distress, Non-toxic appearance.   Psychiatric: Calm. Not anxious.  HENT:  Oropharynx: no exudate no erythema  Eyes: PERRLA, EOMI, Conjunctiva normal, No discharge.   Breast Exam: no axilla lymphadenopathy. Tenderness to the left axilla. No overlying erythema. Areola is normal appearing with no bleeding or discharge from the nipple.   Musculoskeletal: Neck is soft and supple no meningismus  Lymphatic: No cervical lymphadenopathy noted.   Cardiovascular: Normal heart rate, Normal rhythm, No murmurs  Pulmonary: Lungs are clear to auscultation bilaterally.   Abdomen: soft nondistended and nontender. No rebound and no guarding .  Skin: Warm, Dry, No erythema, No rash.   Neurologic: Alert & oriented, moves all extremities normally. Good sensation to light touch on all extremities.        DIAGNOSTIC STUDIES/PROCEDURES  EKG Interpretation:  12- Lead EKG; interpreted by Dr. John Mann.  Normal sinus rhythm with a rate of 87 bpm.   Normal axis.  Normal intervals.   No ST elevation or depression    Flattened T waves in III and AVF  No widening of QRS complex   Good R wave progression   No diagnostic Q waves.   No change from EKG on 7/24/17   Clinical Impression: Abnormal EKG with no acute change from previous.       COURSE & MEDICAL DECISION MAKING   Nursing notes, VS, [id number] reviewed in chart     11:26 AM - Patient was evaluated; An EKG was ordered. The patient will be medicated with dilaudid for her symptoms.     12:48 PM Patient reevaluated at bedside. She is resting comfortably and reports improvement in her pain. Discussed EKG results with the patient. She was reassured that her pain is not cardiac related. Discussed with patient likelihood " that her breast mass is the result of hormonal changes associated with her menstrual period.       Patient is here I believe she is low risk because of several reasons one it is breast pain it is reproducible duration has been for several days. In addition she has follow-up for this. She's are primarily because grandmother is worried about her heart. She has no arrhythmia on the monitor after being observed for an hour or EKG is unchanged. I do not think she needs cardiac enzymes.    FINAL IMPRESSION   1. Chest pain, unspecified type     2. Breast tenderness      Comfort RODRIGUEZ (Eva), am scribing for, and in the presence of, John Mann, *.  Electronically signed by: Comfort Pimentel (Eva), 7/30/2017  IJohn, * personally performed the services described in this documentation, as scribed by Comfort Pimentel in my presence, and it is both accurate and complete.    The note accurately reflects work and decisions made by me.  John Mann  7/30/2017  12:57 PM

## 2017-07-31 ENCOUNTER — HOME CARE VISIT (OUTPATIENT)
Dept: HOME HEALTH SERVICES | Facility: HOME HEALTHCARE | Age: 27
End: 2017-07-31
Payer: MEDICARE

## 2017-07-31 PROCEDURE — 665998 HH PPS REVENUE CREDIT

## 2017-07-31 PROCEDURE — 665999 HH PPS REVENUE DEBIT

## 2017-08-01 PROCEDURE — 665998 HH PPS REVENUE CREDIT

## 2017-08-01 PROCEDURE — 665999 HH PPS REVENUE DEBIT

## 2017-08-02 ENCOUNTER — HOME CARE VISIT (OUTPATIENT)
Dept: HOME HEALTH SERVICES | Facility: HOME HEALTHCARE | Age: 27
End: 2017-08-02
Payer: MEDICARE

## 2017-08-02 ENCOUNTER — OFFICE VISIT (OUTPATIENT)
Dept: MEDICAL GROUP | Facility: MEDICAL CENTER | Age: 27
End: 2017-08-02
Payer: MEDICARE

## 2017-08-02 VITALS
DIASTOLIC BLOOD PRESSURE: 90 MMHG | HEART RATE: 72 BPM | RESPIRATION RATE: 16 BRPM | TEMPERATURE: 97.1 F | SYSTOLIC BLOOD PRESSURE: 141 MMHG

## 2017-08-02 VITALS
DIASTOLIC BLOOD PRESSURE: 74 MMHG | HEIGHT: 62 IN | SYSTOLIC BLOOD PRESSURE: 122 MMHG | RESPIRATION RATE: 16 BRPM | BODY MASS INDEX: 47.84 KG/M2 | HEART RATE: 78 BPM | WEIGHT: 260 LBS | OXYGEN SATURATION: 97 %

## 2017-08-02 DIAGNOSIS — H69.93 EUSTACHIAN TUBE DYSFUNCTION, BILATERAL: ICD-10-CM

## 2017-08-02 PROCEDURE — G0156 HHCP-SVS OF AIDE,EA 15 MIN: HCPCS

## 2017-08-02 PROCEDURE — 665998 HH PPS REVENUE CREDIT

## 2017-08-02 PROCEDURE — 665999 HH PPS REVENUE DEBIT

## 2017-08-02 PROCEDURE — 99213 OFFICE O/P EST LOW 20 MIN: CPT | Performed by: NURSE PRACTITIONER

## 2017-08-02 RX ORDER — FLUTICASONE PROPIONATE 50 MCG
2 SPRAY, SUSPENSION (ML) NASAL DAILY
Qty: 16 G | Refills: 11 | Status: SHIPPED | OUTPATIENT
Start: 2017-08-02 | End: 2018-03-15

## 2017-08-02 RX ORDER — LORATADINE 10 MG/1
10 TABLET ORAL DAILY
Qty: 30 TAB | COMMUNITY
Start: 2017-08-02 | End: 2018-01-29

## 2017-08-02 ASSESSMENT — ENCOUNTER SYMPTOMS: EYE DISCHARGE: 1

## 2017-08-02 NOTE — PROGRESS NOTES
Subjective:      Kristin Balderrama is a 26 y.o. female who presents with Conjunctivitis and Otalgia            Conjunctivitis    Otalgia     Kristin Balderrama is a patient of Dr. Brody here today for eye and concerns.      Patient reports just this morning she noticed some crusting and itching of her left eye. Her lids were crusted together. She states it has been better since then with no further crusting and there has been no discharge. It has been mildly pruritic. She denies vision loss or eye pain. She also states she has been having some discomfort of her left ear. She has a myringotomy tube on the right side. She states the pain on the left side is not constant and comes and goes. There has been no discharge. She denies fever or chills. She denies sinus pressure although she does have some sinus congestion for which she used Afrin once. She does not take antihistamines or steroid nasal sprays.      Social History   Substance Use Topics   • Smoking status: Never Smoker    • Smokeless tobacco: Never Used   • Alcohol Use: No     Current Outpatient Prescriptions   Medication Sig Dispense Refill   • fluticasone (FLONASE) 50 MCG/ACT nasal spray Spray 2 Sprays in nose every day. 16 g 11   • loratadine (CLARITIN) 10 MG Tab Take 1 Tab by mouth every day. 30 Tab    • oxycodone-acetaminophen (PERCOCET-10)  MG Tab Take 2 Tabs by mouth 2 Times a Day. Indications: Pain     • doxycycline (VIBRAMYCIN) 100 MG Tab Take 100 mg by mouth 2 times a day. Pt started on 7/23/2017 for 10 day course for cellulitis of breast     • mupirocin (BACTROBAN) 2 % Ointment Apply 1 Application to affected area(s) every day. Pt started on 7/23/2017 for cellulitis of breast     • tobramycin (TOBREX) 0.3 % Solution ophthalmic solution Place 2 Drops in both eyes every 4 hours. Pt started on 7/4/2017 for 7 day course for ear infection.     • sitagliptin (JANUVIA) 100 MG Tab Take 1 Tab by mouth every day. 30 Tab 6   • ziprasidone (GEODON)  80 MG Cap TAKE 2 CAPSULES BY MOUTH NIGHTLY AT BEDTIME 60 Cap 5   • GRALISE 600 MG Tab Take 600 mg by mouth 3 times a day.     • nitroGLYCERIN (NITROSTAT) 0.3 MG SL tablet PLACE 1 TABLET UNDER TONGUE IF NEEDED FOR CHEST PAIN EVERY 5 MINUTES FOR 3 DOSES AS DIRECTED  0   • Mirabegron ER (MYRBETRIQ) 25 MG TABLET SR 24 HR Take 1 Tab by mouth every day.     • lactobacillus granules (LACTINEX/FLORANEX) Pack Take 1 Packet by mouth 3 times a day, with meals.     • levothyroxine (SYNTHROID) 75 MCG Tab Take 75 mcg by mouth Every morning on an empty stomach.     • furosemide (LASIX) 40 MG Tab Take 40 mg by mouth every day.     • Cranberry 400 MG Tab Take 2 Tabs by mouth every day.     • promethazine (PHENERGAN) 25 MG Tab Take 1 Tab by mouth every 6 hours as needed for Nausea/Vomiting. 30 Tab 6   • aspirin EC (ECOTRIN) 81 MG Tablet Delayed Response Take 1 Tab by mouth every day. 30 Tab 6   • baclofen (LIORESAL) 10 MG Tab Take 1 Tab by mouth 3 times a day. 90 Tab 6   • fluoxetine (PROZAC) 10 MG Cap TAKE ONE CAPSULE BY MOUTH ONCE A DAY 30 Cap 2   • Melatonin 5 MG Tab Take 10 mg by mouth every bedtime.     • vitamin D, Ergocalciferol, (DRISDOL) 08104 UNITS Cap capsule Take 50,000 Units by mouth every Friday.     • cyanocobalamin (HM VITAMIN B12) 500 MCG tablet Take 500 mcg by mouth 2 times a day.     • sodium bicarbonate 325 MG Tab Take 2 Tabs by mouth 3 times a day.     • ziprasidone (GEODON) 40 MG Cap Take 190 mg by mouth every day. Geodon for hallucinations/schizophrenia     • ziprasidone (GEODON) 40 MG Cap One cap by mouth at noon after meal. 30 Cap 2   • ziprasidone (GEODON) 80 MG Cap Take 1 Cap by mouth 2 Times a Day. 60 Cap 2   • lactulose 10 GM/15ML Solution Take 10 g by mouth 2 times a day as needed (For constipation).     • ivabradine (CORLANOR) 5 MG Tab tablet Take 1 Tab by mouth 2 times a day, with meals. 60 Tab 6   • fentanyl (DURAGESIC) 25 MCG/HR PATCH 72 HR Apply 1 Patch to skin as directed every 72 hours.       No  "current facility-administered medications for this visit.     Family History   Problem Relation Age of Onset   • Hypertension Mother    • Sleep Apnea Mother    • Heart Disease Mother    • Other Mother      hypothryod   • Hypertension Maternal Uncle    • Heart Disease Maternal Grandmother    • Hypertension Maternal Grandmother    • Other Sister      Narcolepsy;fibromyalsia;bone;nerve   • Genitourinary () Sister      endometriosis     Past Medical History   Diagnosis Date   • Scoliosis    • Multiple personality disorder    • Chronic UTI 9/18/2010   • Heart burn    • Pain 08-15-12     back, 7/10   • History of falling    • Sinus tachycardia 10/31/2013   • Urinary incontinence    • Hypertension    • Disorder of thyroid    • Obesity    • Pneumonia 2012   • ASTHMA      when around smoke   • Breath shortness      with tachycardia   • Anginal syndrome      random chest pain especially with tachycardia   • Psychosis    • Arthritis      osteo   • PCOS (polycystic ovarian syndrome)    • Gynecological disorder      PCOS   • Renal disorder      \"kidney disease, stage 1\" nephrologist, Dr. Vallejo   • Arrhythmia      \"sinus tachycardia\", cariologist, Dr. 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   • Sleep apnea      CPAP \"pulmonary doctor took me off mid year 2016\"   • Mitochondrial disease    • Fall        Review of Systems   HENT: Positive for ear pain.    Eyes: Positive for discharge.   All other systems reviewed and are negative.         Objective:     /74 mmHg  Pulse 78  Resp 16  Ht 1.575 m (5' 2.01\")  Wt 117.935 kg (260 lb)  BMI 47.54 kg/m2  SpO2 97%     Physical Exam   Constitutional: She is oriented to person, place, and time. She appears well-developed and well-nourished. No distress.   HENT:   Right myringotomy tube in place and there is no discharge in the canal. Canal mildly erythematous. Left tympanic membrane " retracted and mildly erythematous but no perforation or bulging.   Eyes: Conjunctivae are normal. Pupils are equal, round, and reactive to light. Right eye exhibits no chemosis and no discharge. Left eye exhibits no chemosis and no discharge.   No evidence of erythema or discharge.   Neurological: She is alert and oriented to person, place, and time.   Skin: Skin is warm. She is not diaphoretic. No erythema.   Psychiatric: She has a normal mood and affect. Her behavior is normal. Judgment and thought content normal.   Nursing note and vitals reviewed.              Assessment/Plan:     1. Eustachian tube dysfunction, bilateral  I do not see evidence of conjunctivitis but she does have some effusion of the left tympanic membrane with patent myringotomy tube on the right. I will have her start on Flonase nasal spray as well as over-the-counter Claritin. We discussed the possibility of antibiotics if symptoms worsen but she is allergic to multiple antibiotics so we agreed that we would wait and see how she responds to non-antibiotics first.  - fluticasone (FLONASE) 50 MCG/ACT nasal spray; Spray 2 Sprays in nose every day.  Dispense: 16 g; Refill: 11  - loratadine (CLARITIN) 10 MG Tab; Take 1 Tab by mouth every day.  Dispense: 30 Tab

## 2017-08-02 NOTE — MR AVS SNAPSHOT
"        Kristin Balderrama   2017 1:40 PM   Office Visit   MRN: [id number]    Department:  [address] [place] [address]   Dept Phone:  [phone]    Description:  Female : 1990   Provider:  AXEL Casas           Reason for Visit     Conjunctivitis noticed yesterday    Otalgia for a few days       Allergies as of 2017     Allergen Noted Reactions    Cefdinir 2016   Shortness of Breath, Itching    Tolerated 17    Depakote [Divalproex Sodium] 2010   Unspecified    Muscle spasms/muscle pain and weakness      Abilify 2013   Unspecified    Headaches/muscle twitching      Amitriptyline 10/31/2013   Unspecified    Headaches      Amoxicillin 2010   Rash    Pt states \"I get a rash\".      Ciprofloxacin 2009   Rash    Pt states \"I get a rash\".      Clindamycin 2011   Nausea    Even with food      Ees [Erythromycin] 2010   Vomiting, Nausea    Flagyl [Metronidazole Hcl] 2011   Unspecified    \"eye problems\"      Flomax [Tamsulosin Hydrochloride] 2009   Swelling    Metformin 2013   Unspecified    Increased lactic acid       Sulfa Drugs 2010   Hives, Rash    RXN=since childhood    Tape 08/15/2012   Rash    Tears skin off   coban with Tegaderm tape ok  RXN=ongoing    Vancomycin 07/10/2016   Itching    Pt becomes flushed in face and chest.   RXN=7/10/16    Cephalexin [Keflex] 2017   Rash    Pt states she gets a rash with this medication    Erythromycin 2017   Rash    .    Levofloxacin 10/27/2016   Unspecified    Leg muscle cramps    Metronidazole 2017   Rash    .    Valproic Acid 2017   Rash    .      You were diagnosed with     Eustachian tube dysfunction, bilateral   [[id number]]         Vital Signs     Blood Pressure Pulse Respirations Height Weight Body Mass Index    122/74 mmHg 78 16 1.575 m (5' 2.01\") 117.935 kg (260 lb) 47.54 kg/m2    Oxygen Saturation Smoking Status                97% Never Smoker           Basic " Information     Date Of Birth Sex Race Ethnicity Preferred Language    1990 Female White Non- English      Your appointments     Aug 02, 2017  1:40 PM   Established Patient with AXEL Casas   [facility] [address]    [address]  [address]  [place] [address]   [phone]           You will be receiving a confirmation call a few days before your appointment from our automated call confirmation system.            Aug 03, 2017 To Be Determined   AIDE-HH-HOME VISIT with Ketan Beckwith C.N.A.   [facility] (--)    [address]  [place] [address]   [phone]            Aug 03, 2017 10:00 AM   MA DX30 with RK COOPER MG 1   [facility] [place]    [address]  [place] [address]   [phone]            Aug 04, 2017  8:00 AM   SN-HH-OASIS DISCHARGE with Sinai Hooker R.N.   [facility] (--)    [address]  [place] [address]   [phone]            Aug 07, 2017  3:00 AM   AIDE-HH-HOME VISIT with Karla Marquez C.N.A.   [facility] (--)    [address]  [place] [address]   [phone]            Aug 09, 2017 To Be Determined   AIDE-HH-HOME VISIT with [facility] Aide   [facility] (--)    [address]  [place] [address]   [phone]            Aug 11, 2017  1:00 AM   AIDE-HH-HOME VISIT with Karla Marquez C.N.A.   [facility] (--)    [address]  [place] [address]   [phone]            Aug 11, 2017 10:00 AM   Follow Up Visit with Sandoval Fox M.D.   [facility] (--)    [address]  [place] [address]   [phone]           You will be receiving a confirmation call a few days before your appointment from our automated call confirmation system.            Aug 14, 2017 To Be Determined   AIDE-HH-HOME VISIT with Karla Marquez C.N.A.   [facility] (--)    [address]  [place] [address]   [phone]            Aug 16, 2017 To Be Determined   AIDE-HH-HOME VISIT with [facility] Aide   [facility] (--)    [address]  [place] [address]   [phone]            Aug 18, 2017 To Be Determined   AIDE-HH-HOME VISIT with Karla Marquez C.N.A.   [facility] (--)    [address]  [place] [address]   [phone]            Aug 21, 2017 To Be Determined   AIDE-HH-HOME VISIT with Karla Marquez C.N.A.   [facility] (--)    [address]  [place] [address]   [phone]            Aug 23, 2017 To Be Determined   AIDE-HH-HOME VISIT with [facility] Aide   [facility] (--)    [address]  [place] [address]   [phone]            Aug 24, 2017 11:30 AM   Follow Up Med Management with Ronny Burnette M.D.   [facility] [address] [place] [address])    [address] [place] [address]  [address]  [place] [address]   [phone]            Aug 25, 2017 To Be Determined   AIDE-HH-HOME VISIT with Karla Marquez C.N.A.   [facility] (--)    [address]  [place] [address]   [phone]            Aug 29, 2017  8:00 AM   Individual Therapy with Blanca Brantley L.C.S.W.   [facility]    [address]  [address]  [place] [address] [phone]            Sep 18, 2017  8:00 AM   Individual Therapy with Blanca Brantley L.C.S.W.   [facility]    [address]  [address]  [place] [address] [phone]            Oct 04, 2017 10:00 AM   Individual Therapy with Blanca Brantley L.C.S.W.   [facility]    [address]  [address]  [place] [address]   [phone]            Oct 06, 2017  7:20 AM   Established Patient with Torres Brody M.D.   [facility] [address]    [address]  [facility] [address]  [place] [address]   [phone]           You will be receiving a confirmation call a few days before your appointment from our automated call  confirmation system.              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 Low  9/18/2010 - Present    Neurogenic bladder N31.9 Low  4/2/2011 - Present    Sinus tachycardia (Chronic) R00.0 High  10/31/2013 - Present    Knee pain, right M25.561 Low  2/13/2014 - Present    Anxiety F41.9 Low  12/16/2014 - Present    Fatty liver disease, nonalcoholic K76.0 Low  1/19/2015 - Present    Progressive focal motor weakness R53.1 Low  6/28/2015 - Present    Acquired hypothyroidism E03.9 Low  11/23/2015 - Present    PCOS (polycystic ovarian syndrome) E28.2 Low  11/23/2015 - Present    H/O prior ablation treatment Z98.890   2/10/2016 - Present    Peripheral neuropathy (CMS-HCC) (Chronic) G62.9   3/6/2016 - Present    TONYA on CPAP G47.33, Z99.89 Low  3/7/2016 - Present    Morbidly obese (CMS-HCC) E66.01   3/7/2016 - Present    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   3/7/2016 - Present    Scoliosis M41.9   3/7/2016 - Present    GERD (gastroesophageal reflux disease) (Chronic) K21.9   3/7/2016 - Present    Vitamin D deficiency E55.9   5/21/2016 - Present    Chronic inflammatory arthritis (Chronic) M19.90 Medium  5/23/2016 - Present    Weakness of both lower extremities R29.898   6/22/2016 - Present    Elevated sedimentation rate R70.0   6/27/2016 - Present    Galactorrhea O92.6   7/22/2016 - Present    Psychosis, schizophrenia, simple (HCC) (Chronic) F20.89 Low Chronic 9/29/2016 - Present    Schizophrenia (CMS-HCC) F20.9 Low  10/27/2016 - Present    Chronic pain syndrome (Chronic) G89.4   10/27/2016 - Present    Bowel and bladder incontinence R32, R15.9   10/27/2016 - Present    Depression F32.9 Low  10/28/2016 - Present    HTN (hypertension) (Chronic) I10   11/1/2016 - Present    Hypovitaminosis D E55.9   11/29/2016 - Present    Leg weakness R29.898   1/4/2017 - Present    Weakness R53.1   1/22/2017 - Present     Paraparesis of both lower limbs (CMS-[facility]) G82.20 High  1/24/2017 - Present    Chronic suprapubic catheter (CMS-[facility]) (Chronic) Z93.59   2/16/2017 - Present    Leg weakness, bilateral R29.898   2/22/2017 - Present    Weakness of right upper extremity R29.898   2/23/2017 - Present    Chest pain R07.9   3/30/2017 - Present    On home oxygen therapy (Chronic) Z99.81   4/15/2017 - Present    Dysuria R30.0   5/9/2017 - Present    UTI (urinary tract infection) N39.0   5/13/2017 - Present    Weakness R53.1   5/13/2017 - Present    Enterococcus faecalis infection B95.2   5/13/2017 - Present    Fall at home W19.XXXA, Y92.099   5/13/2017 - Present    Hashimoto's encephalopathy E06.3, G93.49   5/17/2017 - Present    Rash R21   6/9/2017 - Present    IGT (impaired glucose tolerance) R73.02   7/6/2017 - Present      Health Maintenance        Date Due Completion Dates    IMM VARICELLA (CHICKENPOX) VACCINE (1 of 2 - 2 Dose Adolescent Series) 10/13/2002 ---    IMM INFLUENZA (1) 9/1/2017 10/5/2016, 9/5/2013, 12/7/2011, 11/7/2011    A1C SCREENING 1/6/2018 7/6/2017, 6/28/2017, 4/6/2017, 12/7/2016, 10/14/2016, 3/9/2016, 9/5/2014    IMM HEP A VACCINE (2 of 2 - Standard Series) 1/18/2018 7/18/2017    URINE ACR / MICROALBUMIN 5/5/2018 5/5/2017, 3/7/2017, 12/7/2016, 10/14/2016, 7/28/2016    RETINAL SCREENING 5/23/2018 5/23/2017    DIABETES MONOFILAMENT / LE EXAM 5/23/2018 5/23/2017    FASTING LIPID PROFILE 7/6/2018 7/6/2017, 3/7/2017, 2/24/2017, 12/7/2016, 10/28/2016, 10/14/2016, 3/21/2014    SERUM CREATININE 7/24/2018 7/24/2017, 7/19/2017, 7/6/2017, 6/21/2017, 5/18/2017, 5/17/2017, 5/14/2017, 5/13/2017, 5/5/2017, 4/14/2017, 4/13/2017, 4/12/2017, 4/5/2017, 3/27/2017, 3/18/2017, 3/17/2017, 3/16/2017, 3/11/2017, 3/10/2017, 3/9/2017, 3/7/2017, 2/25/2017, 2/24/2017, 2/23/2017, 2/22/2017, 1/30/2017, 1/27/2017, 1/25/2017, 1/22/2017, 1/22/2017, 1/18/2017, 1/18/2017, 12/7/2016, 12/6/2016, 11/4/2016, 11/3/2016, 11/2/2016, 11/1/2016, 10/28/2016,  "10/27/2016, 10/14/2016, 10/4/2016, 10/3/2016, 9/29/2016, 8/16/2016, 7/28/2016, 7/28/2016, 7/10/2016, 6/25/2016, 6/23/2016, 6/22/2016, 6/7/2016, 5/18/2016, 4/19/2016, 4/3/2016, 3/30/2016, 3/29/2016, 3/28/2016, 3/14/2016, 3/10/2016, 3/9/2016, 3/7/2016, 3/6/2016, 2/15/2016, 2/12/2016, 1/29/2016, 1/28/2016, 1/26/2016, 11/28/2015, 11/27/2015, 11/23/2015, 11/22/2015, 7/9/2015, 7/8/2015, 6/27/2015, 4/14/2015, 3/23/2015, 2/18/2015, 10/27/2014, 9/5/2014, 3/21/2014, 12/24/2013, 1/12/2013, 8/24/2012, 8/23/2012, 8/22/2012, 8/15/2012, 4/21/2012, 4/20/2012, 4/19/2012, 9/17/2011, 4/3/2011, 4/2/2011, 3/31/2011, 9/20/2010, 9/19/2010, 9/18/2010, 8/28/2010, 7/20/2010, 1/30/2007    PAP SMEAR 7/22/2019 7/22/2016 (Postponed), 2/19/2013 (N/S)    Override on 7/22/2016: Postponed (per pt was told could \"skip\" 2016)    Override on 2/19/2013: (N/S) ([place])    COLONOSCOPY 9/25/2023 9/25/2013    IMM DTaP/Tdap/Td Vaccine (8 - Td) 7/23/2027 7/23/2017, 8/6/2012, 9/18/2010, 3/3/1994, 5/17/1991, 5/10/1990, 3/23/1990, 1990            Current Immunizations     Dtap Vaccine 3/3/1994, 5/17/1991, 5/10/1990, 3/23/1990, 1990    HIB Vaccine(PEDVAX) 1/11/1991    HPV Quadrivalent Vaccine (GARDASIL) 10/27/2011, 5/27/2011, 3/1/2011    Hepatitis A Vaccine, Adult 7/18/2017  2:15 PM    Hepatitis B Vaccine Non-Recombivax (Ped/Adol) 1/28/2004, 10/28/2003, 10/29/1999    Influenza TIV (IM) 9/5/2013, 12/7/2011, 11/7/2011    Influenza Vaccine Adult HD 10/5/2016    MMR Vaccine 3/3/1994, 1/11/1991    OPV - Historical Data 3/3/1994, 5/17/1991, 5/10/1990, 3/23/1990, 1990    Pneumococcal Vaccine (PCV7) Historical Data 11/8/2015    Pneumococcal polysaccharide vaccine (PPSV-23) 1/23/2017  5:35 PM, 1/14/2017    TD Vaccine 9/18/2010  4:45 PM    Tdap Vaccine 7/23/2017, 8/6/2012      Below and/or attached are the medications your provider expects you to take. Review all of your home medications and newly ordered medications with your provider and/or pharmacist. " Follow medication instructions as directed by your provider and/or pharmacist. Please keep your medication list with you and share with your provider. Update the information when medications are discontinued, doses are changed, or new medications (including over-the-counter products) are added; and carry medication information at all times in the event of emergency situations     Allergies:  CEFDINIR - Shortness of Breath,Itching     DEPAKOTE - Unspecified     ABILIFY - Unspecified     AMITRIPTYLINE - Unspecified     AMOXICILLIN - Rash     CIPROFLOXACIN - Rash     CLINDAMYCIN - Nausea     EES - Vomiting,Nausea     FLAGYL - Unspecified     FLOMAX - Swelling     METFORMIN - Unspecified     SULFA DRUGS - Hives,Rash     TAPE - Rash     VANCOMYCIN - Itching     CEPHALEXIN - Rash     ERYTHROMYCIN - Rash     LEVOFLOXACIN - Unspecified     METRONIDAZOLE - Rash     VALPROIC ACID - Rash               Medications  Valid as of: August 02, 2017 -  1:30 PM    Generic Name Brand Name Tablet Size Instructions for use    Aspirin (Tablet Delayed Response) ECOTRIN 81 MG Take 1 Tab by mouth every day.        Baclofen (Tab) LIORESAL 10 MG Take 1 Tab by mouth 3 times a day.        Cranberry (Tab) Cranberry 400 MG Take 2 Tabs by mouth every day.        Cyanocobalamin (Tab) vitamin b12 500 MCG Take 500 mcg by mouth 2 times a day.        Doxycycline Hyclate (Tab) VIBRAMYCIN 100 MG Take 100 mg by mouth 2 times a day. Pt started on 7/23/2017 for 10 day course for cellulitis of breast        Ergocalciferol (Cap) DRISDOL 60646 UNITS Take 50,000 Units by mouth every Friday.        FentaNYL (PATCH 72 HR) DURAGESIC 25 MCG/HR Apply 1 Patch to skin as directed every 72 hours.        FLUoxetine HCl (Cap) PROZAC 10 MG TAKE ONE CAPSULE BY MOUTH ONCE A DAY        Fluticasone Propionate (Suspension) FLONASE 50 MCG/ACT Spray 2 Sprays in nose every day.        Furosemide (Tab) LASIX 40 MG Take 40 mg by mouth every day.        Gabapentin (Once-Daily) (Tab)  GRALISE 600 MG Take 600 mg by mouth 3 times a day.        Ivabradine HCl (Tab) CORLANOR 5 MG Take 1 Tab by mouth 2 times a day, with meals.        Lactobacillus (Pack) LACTINEX/FLORANEX  Take 1 Packet by mouth 3 times a day, with meals.        Lactulose (Solution) lactulose 10 GM/15ML Take 10 g by mouth 2 times a day as needed (For constipation).        Levothyroxine Sodium (Tab) SYNTHROID 75 MCG Take 75 mcg by mouth Every morning on an empty stomach.        Loratadine (Tab) CLARITIN 10 MG Take 1 Tab by mouth every day.        Melatonin (Tab) Melatonin 5 MG Take 10 mg by mouth every bedtime.        Mirabegron (TABLET SR 24 HR) Mirabegron ER 25 MG Take 1 Tab by mouth every day.        Mupirocin (Ointment) BACTROBAN 2 % Apply 1 Application to affected area(s) every day. Pt started on 7/23/2017 for cellulitis of breast        Nitroglycerin (SL Tab) NITROSTAT 0.3 MG PLACE 1 TABLET UNDER TONGUE IF NEEDED FOR CHEST PAIN EVERY 5 MINUTES FOR 3 DOSES AS DIRECTED        Oxycodone-Acetaminophen (Tab) PERCOCET-10  MG Take 2 Tabs by mouth 2 Times a Day. Indications: Pain        Promethazine HCl (Tab) PHENERGAN 25 MG Take 1 Tab by mouth every 6 hours as needed for Nausea/Vomiting.        SITagliptin Phosphate (Tab) JANUVIA 100 MG Take 1 Tab by mouth every day.        Sodium Bicarbonate (Tab) sodium bicarbonate 325 MG Take 2 Tabs by mouth 3 times a day.        Tobramycin (Solution) TOBREX 0.3 % Place 2 Drops in both eyes every 4 hours. Pt started on 7/4/2017 for 7 day course for ear infection.        Ziprasidone HCl (Cap) GEODON 80 MG TAKE 2 CAPSULES BY MOUTH NIGHTLY AT BEDTIME        Ziprasidone HCl (Cap) GEODON 40 MG Take 190 mg by mouth every day. Geodon for hallucinations/schizophrenia        Ziprasidone HCl (Cap) GEODON 40 MG One cap by mouth at noon after meal.        Ziprasidone HCl (Cap) GEODON 80 MG Take 1 Cap by mouth 2 Times a Day.        .                 Medicines prescribed today were sent to:     [id number] - [place], [place] - [address] [place] [address]    Phone: [phone] Fax: [phone]    Open 24 Hours?: No      Medication refill instructions:       If your prescription bottle indicates you have medication refills left, it is not necessary to call your provider’s office. Please contact your pharmacy and they will refill your medication.    If your prescription bottle indicates you do not have any refills left, you may request refills at any time through one of the following ways: The online [employer] system (except Urgent Care), by calling your provider’s office, or by asking your pharmacy to contact your provider’s office with a refill request. Medication refills are processed only during regular business hours and may not be available until the next business day. Your provider may request additional information or to have a follow-up visit with you prior to refilling your medication.   *Please Note: Medication refills are assigned a new Rx number when refilled electronically. Your pharmacy may indicate that no refills were authorized even though a new prescription for the same medication is available at the pharmacy. Please request the medicine by name with the pharmacy before contacting your provider for a refill.        Other Notes About Your Plan     DME:  [facility] / ph [phone] / fax [phone]              [employer] Access Code: Activation code not generated  Current [employer] Status: Active

## 2017-08-03 ENCOUNTER — HOME CARE VISIT (OUTPATIENT)
Dept: HOME HEALTH SERVICES | Facility: HOME HEALTHCARE | Age: 27
End: 2017-08-03
Payer: MEDICARE

## 2017-08-03 ENCOUNTER — APPOINTMENT (OUTPATIENT)
Dept: RADIOLOGY | Facility: MEDICAL CENTER | Age: 27
End: 2017-08-03
Attending: NURSE PRACTITIONER
Payer: MEDICARE

## 2017-08-03 ENCOUNTER — HOSPITAL ENCOUNTER (OUTPATIENT)
Facility: MEDICAL CENTER | Age: 27
End: 2017-08-05
Attending: EMERGENCY MEDICINE | Admitting: INTERNAL MEDICINE
Payer: MEDICARE

## 2017-08-03 VITALS
HEART RATE: 93 BPM | TEMPERATURE: 96.8 F | DIASTOLIC BLOOD PRESSURE: 69 MMHG | RESPIRATION RATE: 20 BRPM | SYSTOLIC BLOOD PRESSURE: 123 MMHG

## 2017-08-03 DIAGNOSIS — T50.901A OVERDOSE, ACCIDENTAL OR UNINTENTIONAL, INITIAL ENCOUNTER: ICD-10-CM

## 2017-08-03 LAB — EKG IMPRESSION: NORMAL

## 2017-08-03 PROCEDURE — 665998 HH PPS REVENUE CREDIT

## 2017-08-03 PROCEDURE — G0156 HHCP-SVS OF AIDE,EA 15 MIN: HCPCS

## 2017-08-03 PROCEDURE — 665999 HH PPS REVENUE DEBIT

## 2017-08-03 PROCEDURE — 99285 EMERGENCY DEPT VISIT HI MDM: CPT

## 2017-08-04 ENCOUNTER — RESOLUTE PROFESSIONAL BILLING HOSPITAL PROF FEE (OUTPATIENT)
Dept: HOSPITALIST | Facility: MEDICAL CENTER | Age: 27
End: 2017-08-04
Payer: MEDICARE

## 2017-08-04 ENCOUNTER — HOME CARE VISIT (OUTPATIENT)
Dept: HOME HEALTH SERVICES | Facility: HOME HEALTHCARE | Age: 27
End: 2017-08-04
Payer: MEDICARE

## 2017-08-04 PROBLEM — E03.9 HYPOTHYROIDISM: Chronic | Status: ACTIVE | Noted: 2017-08-04

## 2017-08-04 PROBLEM — T50.901A ACCIDENTAL OVERDOSE: Status: ACTIVE | Noted: 2017-08-04

## 2017-08-04 PROBLEM — R29.898 RIGHT LEG WEAKNESS: Status: ACTIVE | Noted: 2017-08-04

## 2017-08-04 LAB
ALBUMIN SERPL BCP-MCNC: 4 G/DL (ref 3.2–4.9)
ALBUMIN/GLOB SERPL: 1.5 G/DL
ALP SERPL-CCNC: 80 U/L (ref 30–99)
ALT SERPL-CCNC: 40 U/L (ref 2–50)
AMPHET UR QL SCN: NEGATIVE
ANION GAP SERPL CALC-SCNC: 8 MMOL/L (ref 0–11.9)
ANION GAP SERPL CALC-SCNC: 8 MMOL/L (ref 0–11.9)
APAP SERPL-MCNC: <10 UG/ML (ref 10–30)
APPEARANCE UR: CLEAR
AST SERPL-CCNC: 22 U/L (ref 12–45)
BACTERIA GENITAL QL WET PREP: NORMAL
BARBITURATES UR QL SCN: NEGATIVE
BASOPHILS # BLD AUTO: 0.6 % (ref 0–1.8)
BASOPHILS # BLD AUTO: 0.9 % (ref 0–1.8)
BASOPHILS # BLD: 0.04 K/UL (ref 0–0.12)
BASOPHILS # BLD: 0.05 K/UL (ref 0–0.12)
BENZODIAZ UR QL SCN: NEGATIVE
BILIRUB SERPL-MCNC: 0.4 MG/DL (ref 0.1–1.5)
BILIRUB UR QL STRIP.AUTO: NEGATIVE
BUN SERPL-MCNC: 13 MG/DL (ref 8–22)
BUN SERPL-MCNC: 13 MG/DL (ref 8–22)
BZE UR QL SCN: NEGATIVE
C TRACH DNA SPEC QL NAA+PROBE: NEGATIVE
CA-I SERPL-SCNC: 1.2 MMOL/L (ref 1.1–1.3)
CALCIUM SERPL-MCNC: 9.5 MG/DL (ref 8.5–10.5)
CALCIUM SERPL-MCNC: 9.5 MG/DL (ref 8.5–10.5)
CANNABINOIDS UR QL SCN: NEGATIVE
CHLORIDE SERPL-SCNC: 105 MMOL/L (ref 96–112)
CHLORIDE SERPL-SCNC: 105 MMOL/L (ref 96–112)
CHOLEST SERPL-MCNC: 169 MG/DL (ref 100–199)
CK SERPL-CCNC: 70 U/L (ref 0–154)
CO2 SERPL-SCNC: 26 MMOL/L (ref 20–33)
CO2 SERPL-SCNC: 26 MMOL/L (ref 20–33)
COLOR UR: YELLOW
CREAT SERPL-MCNC: 0.68 MG/DL (ref 0.5–1.4)
CREAT SERPL-MCNC: 0.68 MG/DL (ref 0.5–1.4)
CRP SERPL HS-MCNC: 0.48 MG/DL (ref 0–0.75)
EKG IMPRESSION: NORMAL
EOSINOPHIL # BLD AUTO: 0.14 K/UL (ref 0–0.51)
EOSINOPHIL # BLD AUTO: 0.14 K/UL (ref 0–0.51)
EOSINOPHIL NFR BLD: 2.1 % (ref 0–6.9)
EOSINOPHIL NFR BLD: 2.4 % (ref 0–6.9)
ERYTHROCYTE [DISTWIDTH] IN BLOOD BY AUTOMATED COUNT: 41.9 FL (ref 35.9–50)
ERYTHROCYTE [DISTWIDTH] IN BLOOD BY AUTOMATED COUNT: 42.1 FL (ref 35.9–50)
ERYTHROCYTE [SEDIMENTATION RATE] IN BLOOD BY WESTERGREN METHOD: 19 MM/HOUR (ref 0–20)
ETHANOL BLD-MCNC: 0 G/DL
FERRITIN SERPL-MCNC: 33.9 NG/ML (ref 10–291)
GFR SERPL CREATININE-BSD FRML MDRD: >60 ML/MIN/1.73 M 2
GLOBULIN SER CALC-MCNC: 2.6 G/DL (ref 1.9–3.5)
GLUCOSE BLD-MCNC: 111 MG/DL (ref 65–99)
GLUCOSE SERPL-MCNC: 142 MG/DL (ref 65–99)
GLUCOSE SERPL-MCNC: 142 MG/DL (ref 65–99)
GLUCOSE UR STRIP.AUTO-MCNC: NEGATIVE MG/DL
HCG UR QL: NEGATIVE
HCT VFR BLD AUTO: 37 % (ref 37–47)
HCT VFR BLD AUTO: 39 % (ref 37–47)
HDLC SERPL-MCNC: 51 MG/DL
HGB BLD-MCNC: 12.2 G/DL (ref 12–16)
HGB BLD-MCNC: 12.8 G/DL (ref 12–16)
IMM GRANULOCYTES # BLD AUTO: 0.02 K/UL (ref 0–0.11)
IMM GRANULOCYTES # BLD AUTO: 0.02 K/UL (ref 0–0.11)
IMM GRANULOCYTES NFR BLD AUTO: 0.3 % (ref 0–0.9)
IMM GRANULOCYTES NFR BLD AUTO: 0.3 % (ref 0–0.9)
KETONES UR STRIP.AUTO-MCNC: NEGATIVE MG/DL
LDLC SERPL CALC-MCNC: 76 MG/DL
LEUKOCYTE ESTERASE UR QL STRIP.AUTO: NEGATIVE
LYMPHOCYTES # BLD AUTO: 2.18 K/UL (ref 1–4.8)
LYMPHOCYTES # BLD AUTO: 2.3 K/UL (ref 1–4.8)
LYMPHOCYTES NFR BLD: 34.4 % (ref 22–41)
LYMPHOCYTES NFR BLD: 37.2 % (ref 22–41)
MAGNESIUM SERPL-MCNC: 1.8 MG/DL (ref 1.5–2.5)
MCH RBC QN AUTO: 29.5 PG (ref 27–33)
MCH RBC QN AUTO: 29.6 PG (ref 27–33)
MCHC RBC AUTO-ENTMCNC: 32.8 G/DL (ref 33.6–35)
MCHC RBC AUTO-ENTMCNC: 33 G/DL (ref 33.6–35)
MCV RBC AUTO: 89.6 FL (ref 81.4–97.8)
MCV RBC AUTO: 90.1 FL (ref 81.4–97.8)
METHADONE UR QL SCN: NEGATIVE
MICRO URNS: NORMAL
MONOCYTES # BLD AUTO: 0.49 K/UL (ref 0–0.85)
MONOCYTES # BLD AUTO: 0.58 K/UL (ref 0–0.85)
MONOCYTES NFR BLD AUTO: 8.4 % (ref 0–13.4)
MONOCYTES NFR BLD AUTO: 8.7 % (ref 0–13.4)
N GONORRHOEA DNA SPEC QL NAA+PROBE: NEGATIVE
NEUTROPHILS # BLD AUTO: 2.98 K/UL (ref 2–7.15)
NEUTROPHILS # BLD AUTO: 3.61 K/UL (ref 2–7.15)
NEUTROPHILS NFR BLD: 50.8 % (ref 44–72)
NEUTROPHILS NFR BLD: 53.9 % (ref 44–72)
NITRITE UR QL STRIP.AUTO: NEGATIVE
NRBC # BLD AUTO: 0 K/UL
NRBC # BLD AUTO: 0 K/UL
NRBC BLD AUTO-RTO: 0 /100 WBC
NRBC BLD AUTO-RTO: 0 /100 WBC
OPIATES UR QL SCN: POSITIVE
OXYCODONE UR QL SCN: POSITIVE
PCP UR QL SCN: NEGATIVE
PH UR STRIP.AUTO: 6 [PH]
PHOSPHATE SERPL-MCNC: 3.2 MG/DL (ref 2.5–4.5)
PLATELET # BLD AUTO: 191 K/UL (ref 164–446)
PLATELET # BLD AUTO: 202 K/UL (ref 164–446)
PMV BLD AUTO: 11.6 FL (ref 9–12.9)
PMV BLD AUTO: 11.7 FL (ref 9–12.9)
POTASSIUM SERPL-SCNC: 4 MMOL/L (ref 3.6–5.5)
POTASSIUM SERPL-SCNC: 4 MMOL/L (ref 3.6–5.5)
PROPOXYPH UR QL SCN: NEGATIVE
PROT SERPL-MCNC: 6.6 G/DL (ref 6–8.2)
PROT UR QL STRIP: NEGATIVE MG/DL
RBC # BLD AUTO: 4.13 M/UL (ref 4.2–5.4)
RBC # BLD AUTO: 4.33 M/UL (ref 4.2–5.4)
RBC UR QL AUTO: NEGATIVE
SALICYLATES SERPL-MCNC: 0 MG/DL (ref 15–25)
SIGNIFICANT IND 70042: NORMAL
SITE SITE: NORMAL
SODIUM SERPL-SCNC: 139 MMOL/L (ref 135–145)
SODIUM SERPL-SCNC: 139 MMOL/L (ref 135–145)
SOURCE SOURCE: NORMAL
SP GR UR REFRACTOMETRY: 1.02
SP GR UR STRIP.AUTO: 1.03
SPECIMEN SOURCE: NORMAL
TRIGL SERPL-MCNC: 209 MG/DL (ref 0–149)
TSH SERPL DL<=0.005 MIU/L-ACNC: 6.02 UIU/ML (ref 0.3–3.7)
UROBILINOGEN UR STRIP.AUTO-MCNC: 1 MG/DL
WBC # BLD AUTO: 5.9 K/UL (ref 4.8–10.8)
WBC # BLD AUTO: 6.7 K/UL (ref 4.8–10.8)

## 2017-08-04 PROCEDURE — 82728 ASSAY OF FERRITIN: CPT

## 2017-08-04 PROCEDURE — G8996 SWALLOW CURRENT STATUS: HCPCS | Mod: CK

## 2017-08-04 PROCEDURE — 87491 CHLMYD TRACH DNA AMP PROBE: CPT

## 2017-08-04 PROCEDURE — A9270 NON-COVERED ITEM OR SERVICE: HCPCS | Performed by: HOSPITALIST

## 2017-08-04 PROCEDURE — 82550 ASSAY OF CK (CPK): CPT

## 2017-08-04 PROCEDURE — 700102 HCHG RX REV CODE 250 W/ 637 OVERRIDE(OP): Performed by: HOSPITALIST

## 2017-08-04 PROCEDURE — 84100 ASSAY OF PHOSPHORUS: CPT

## 2017-08-04 PROCEDURE — 82962 GLUCOSE BLOOD TEST: CPT

## 2017-08-04 PROCEDURE — A9270 NON-COVERED ITEM OR SERVICE: HCPCS | Performed by: STUDENT IN AN ORGANIZED HEALTH CARE EDUCATION/TRAINING PROGRAM

## 2017-08-04 PROCEDURE — 80307 DRUG TEST PRSMV CHEM ANLYZR: CPT

## 2017-08-04 PROCEDURE — 36415 COLL VENOUS BLD VENIPUNCTURE: CPT

## 2017-08-04 PROCEDURE — 80061 LIPID PANEL: CPT

## 2017-08-04 PROCEDURE — 302131 K PAD MOTOR: Performed by: INTERNAL MEDICINE

## 2017-08-04 PROCEDURE — 81003 URINALYSIS AUTO W/O SCOPE: CPT | Mod: 59

## 2017-08-04 PROCEDURE — 85652 RBC SED RATE AUTOMATED: CPT

## 2017-08-04 PROCEDURE — 665999 HH PPS REVENUE DEBIT

## 2017-08-04 PROCEDURE — 700102 HCHG RX REV CODE 250 W/ 637 OVERRIDE(OP): Performed by: STUDENT IN AN ORGANIZED HEALTH CARE EDUCATION/TRAINING PROGRAM

## 2017-08-04 PROCEDURE — 92610 EVALUATE SWALLOWING FUNCTION: CPT

## 2017-08-04 PROCEDURE — 93005 ELECTROCARDIOGRAM TRACING: CPT | Performed by: EMERGENCY MEDICINE

## 2017-08-04 PROCEDURE — 700102 HCHG RX REV CODE 250 W/ 637 OVERRIDE(OP): Performed by: INTERNAL MEDICINE

## 2017-08-04 PROCEDURE — A9270 NON-COVERED ITEM OR SERVICE: HCPCS | Performed by: INTERNAL MEDICINE

## 2017-08-04 PROCEDURE — 700111 HCHG RX REV CODE 636 W/ 250 OVERRIDE (IP): Performed by: HOSPITALIST

## 2017-08-04 PROCEDURE — 86140 C-REACTIVE PROTEIN: CPT

## 2017-08-04 PROCEDURE — 81025 URINE PREGNANCY TEST: CPT

## 2017-08-04 PROCEDURE — G0378 HOSPITAL OBSERVATION PER HR: HCPCS

## 2017-08-04 PROCEDURE — 84443 ASSAY THYROID STIM HORMONE: CPT

## 2017-08-04 PROCEDURE — 700105 HCHG RX REV CODE 258: Performed by: HOSPITALIST

## 2017-08-04 PROCEDURE — G8997 SWALLOW GOAL STATUS: HCPCS | Mod: CK

## 2017-08-04 PROCEDURE — 83735 ASSAY OF MAGNESIUM: CPT

## 2017-08-04 PROCEDURE — 87591 N.GONORRHOEAE DNA AMP PROB: CPT

## 2017-08-04 PROCEDURE — 82330 ASSAY OF CALCIUM: CPT

## 2017-08-04 PROCEDURE — 665998 HH PPS REVENUE CREDIT

## 2017-08-04 PROCEDURE — 85025 COMPLETE CBC W/AUTO DIFF WBC: CPT

## 2017-08-04 PROCEDURE — 80053 COMPREHEN METABOLIC PANEL: CPT

## 2017-08-04 PROCEDURE — 302154 K THERMIA BLANKET 24X60: Performed by: INTERNAL MEDICINE

## 2017-08-04 RX ORDER — ZIPRASIDONE HYDROCHLORIDE 80 MG/1
160 CAPSULE ORAL NIGHTLY
Status: CANCELLED | OUTPATIENT
Start: 2017-08-04

## 2017-08-04 RX ORDER — ZIPRASIDONE HYDROCHLORIDE 40 MG/1
40 CAPSULE ORAL
Status: CANCELLED | OUTPATIENT
Start: 2017-08-05

## 2017-08-04 RX ORDER — ACETAMINOPHEN 325 MG/1
650 TABLET ORAL EVERY 6 HOURS PRN
Status: DISCONTINUED | OUTPATIENT
Start: 2017-08-04 | End: 2017-08-05 | Stop reason: HOSPADM

## 2017-08-04 RX ORDER — IBUPROFEN 800 MG/1
400 TABLET ORAL EVERY 6 HOURS PRN
Status: DISCONTINUED | OUTPATIENT
Start: 2017-08-04 | End: 2017-08-05 | Stop reason: HOSPADM

## 2017-08-04 RX ORDER — BISACODYL 10 MG
10 SUPPOSITORY, RECTAL RECTAL
Status: DISCONTINUED | OUTPATIENT
Start: 2017-08-04 | End: 2017-08-05 | Stop reason: HOSPADM

## 2017-08-04 RX ORDER — THIAMINE MONONITRATE (VIT B1) 100 MG
100 TABLET ORAL DAILY
Status: DISCONTINUED | OUTPATIENT
Start: 2017-08-04 | End: 2017-08-05 | Stop reason: HOSPADM

## 2017-08-04 RX ORDER — SODIUM CHLORIDE 9 MG/ML
INJECTION, SOLUTION INTRAVENOUS CONTINUOUS
Status: DISCONTINUED | OUTPATIENT
Start: 2017-08-04 | End: 2017-08-05 | Stop reason: HOSPADM

## 2017-08-04 RX ORDER — POLYETHYLENE GLYCOL 3350 17 G/17G
1 POWDER, FOR SOLUTION ORAL
Status: DISCONTINUED | OUTPATIENT
Start: 2017-08-04 | End: 2017-08-05 | Stop reason: HOSPADM

## 2017-08-04 RX ORDER — AMOXICILLIN 250 MG
2 CAPSULE ORAL 2 TIMES DAILY
Status: DISCONTINUED | OUTPATIENT
Start: 2017-08-04 | End: 2017-08-05 | Stop reason: HOSPADM

## 2017-08-04 RX ORDER — DEXTROSE MONOHYDRATE 25 G/50ML
25 INJECTION, SOLUTION INTRAVENOUS
Status: DISCONTINUED | OUTPATIENT
Start: 2017-08-04 | End: 2017-08-05 | Stop reason: HOSPADM

## 2017-08-04 RX ORDER — LEVOTHYROXINE SODIUM 0.07 MG/1
75 TABLET ORAL
Status: DISCONTINUED | OUTPATIENT
Start: 2017-08-04 | End: 2017-08-05 | Stop reason: HOSPADM

## 2017-08-04 RX ADMIN — Medication 100 MG: at 11:44

## 2017-08-04 RX ADMIN — IBUPROFEN 400 MG: 800 TABLET, FILM COATED ORAL at 22:26

## 2017-08-04 RX ADMIN — STANDARDIZED SENNA CONCENTRATE AND DOCUSATE SODIUM 2 TABLET: 8.6; 5 TABLET, FILM COATED ORAL at 20:07

## 2017-08-04 RX ADMIN — SODIUM CHLORIDE: 9 INJECTION, SOLUTION INTRAVENOUS at 15:31

## 2017-08-04 RX ADMIN — ENOXAPARIN SODIUM 40 MG: 100 INJECTION SUBCUTANEOUS at 08:44

## 2017-08-04 RX ADMIN — SODIUM CHLORIDE: 9 INJECTION, SOLUTION INTRAVENOUS at 06:15

## 2017-08-04 ASSESSMENT — ENCOUNTER SYMPTOMS
DEPRESSION: 0
DIARRHEA: 0
NERVOUS/ANXIOUS: 1
WEAKNESS: 1
MEMORY LOSS: 1
FEVER: 0
EYES NEGATIVE: 1
CONSTITUTIONAL NEGATIVE: 1
NAUSEA: 1
BLURRED VISION: 0
COUGH: 0
EYE DISCHARGE: 0
HEARTBURN: 0
EYE REDNESS: 0
MYALGIAS: 1
RESPIRATORY NEGATIVE: 1
HEADACHES: 0
HALLUCINATIONS: 0
PALPITATIONS: 0
DIZZINESS: 0
TINGLING: 1
CARDIOVASCULAR NEGATIVE: 1
BRUISES/BLEEDS EASILY: 0
EYE PAIN: 0
CONSTIPATION: 0
SENSORY CHANGE: 1
ABDOMINAL PAIN: 0
CHILLS: 0
NAUSEA: 0
NEUROLOGICAL NEGATIVE: 1
SORE THROAT: 0
FOCAL WEAKNESS: 1
VOMITING: 0
SHORTNESS OF BREATH: 0

## 2017-08-04 ASSESSMENT — PATIENT HEALTH QUESTIONNAIRE - PHQ9
9. THOUGHTS THAT YOU WOULD BE BETTER OFF DEAD, OR OF HURTING YOURSELF: SEVERAL DAYS
2. FEELING DOWN, DEPRESSED, IRRITABLE, OR HOPELESS: SEVERAL DAYS
4. FEELING TIRED OR HAVING LITTLE ENERGY: NEARLY EVERY DAY
8. MOVING OR SPEAKING SO SLOWLY THAT OTHER PEOPLE COULD HAVE NOTICED. OR THE OPPOSITE, BEING SO FIGETY OR RESTLESS THAT YOU HAVE BEEN MOVING AROUND A LOT MORE THAN USUAL: NOT AT ALL
1. LITTLE INTEREST OR PLEASURE IN DOING THINGS: NOT AT ALL
7. TROUBLE CONCENTRATING ON THINGS, SUCH AS READING THE NEWSPAPER OR WATCHING TELEVISION: SEVERAL DAYS
5. POOR APPETITE OR OVEREATING: NOT AT ALL
SUM OF ALL RESPONSES TO PHQ QUESTIONS 1-9: 7
SUM OF ALL RESPONSES TO PHQ9 QUESTIONS 1 AND 2: 1
3. TROUBLE FALLING OR STAYING ASLEEP OR SLEEPING TOO MUCH: SEVERAL DAYS
6. FEELING BAD ABOUT YOURSELF - OR THAT YOU ARE A FAILURE OR HAVE LET YOURSELF OR YOUR FAMILY DOWN: NOT AL ALL

## 2017-08-04 ASSESSMENT — LIFESTYLE VARIABLES
ALCOHOL_USE: NO
EVER_SMOKED: NEVER
SUBSTANCE_ABUSE: 0

## 2017-08-04 ASSESSMENT — PAIN SCALES - GENERAL
PAINLEVEL_OUTOF10: 9
PAINLEVEL_OUTOF10: 9
PAINLEVEL_OUTOF10: 8
PAINLEVEL_OUTOF10: 9
PAINLEVEL_OUTOF10: 8
PAINLEVEL_OUTOF10: 8
PAINLEVEL_OUTOF10: 7
PAINLEVEL_OUTOF10: 8

## 2017-08-04 NOTE — PROGRESS NOTES
Spoke with Dr. Santamaria, diet ordered, questioned about pain medications and restarting any pertinent home meds, MD states will hold off at this time due to OD and address with attending

## 2017-08-04 NOTE — ED NOTES
"Chief Complaint   Patient presents with   • Drug Overdose     BIB EMS, pt states she took an unknown quantity of unknown medications because she got confused about her medications. Per EMS the pt did this 2w ago as well, and it was believed she had a dystonic rxn. EMS gave 50mg benadryl IM. Pt axo x4. VSS   • Muscle Spasms     C/O Muscle cramps     Pt placed on monitors. Needs met at this time.    Pulse 80  Temp(Src) 36.6 °C (97.9 °F)  Resp 14  Ht 1.6 m (5' 2.99\")  Wt 117.935 kg (260 lb)  BMI 46.07 kg/m2  SpO2 97%    "

## 2017-08-04 NOTE — PROGRESS NOTES
2 rn skin check with Estefanía HASSAN, noted healing scab on right breast, patient states its from a spider bite, it is not open, heeled scar on right thigh from muscle biopsy, all skin intact, callous dry feet

## 2017-08-04 NOTE — PROGRESS NOTES
Internal Medicine Interval Note    Name Kristin Balderrama     1990   Age/Sex 26 y.o. female   MRN [id number]   Code Status FULL     After 5PM or if no immediate response to page, please call for cross-coverage  Attending/Team: Lyle/Seth See Patient List for primary contact information  Call [phone] to page    1st Call - Day Intern (R1):   Ryan 2nd Call - Day Sr. Resident (R2/R3):   Michael Hong         Reason for interval visit  (Principal Problem)   Accidental overdose    Interval Problem Daily Status Update  (24 hours)   Pt complains of weakness in UE and LE as well as pain throughout, pt also complained of a spasm in her RLE. Pt stated the pain was most severe in the RLE. Other spams/tetany pt experienced appear to have resolved. Pt was lethargic and seen sleeping prior to interviewing, however, once awake she answered questions appropriately. Pt also mentioned dysuria and stated that she often gets UTIs. Pt had some nausea overnight but she states now that this has resolved. Pt denied SI/HI, stated she overdosed due to anxiety resulting from onset of her neurological symptoms. Pt was poor historian concerning her PMH. Pt denied any other concerns or complaints.     Review of Systems   Constitutional: Positive for malaise/fatigue. Negative for fever and chills.   HENT: Negative for congestion and sore throat.    Eyes: Negative for pain, discharge and redness.   Respiratory: Negative for cough and shortness of breath.    Cardiovascular: Negative for chest pain and leg swelling.   Gastrointestinal: Negative for nausea, vomiting, abdominal pain, diarrhea and constipation.   Genitourinary: Positive for dysuria.   Musculoskeletal: Positive for myalgias and joint pain.   Skin: Negative for rash.   Neurological: Positive for tingling, sensory change, focal weakness and weakness. Negative for headaches.        Lethargy     Endo/Heme/Allergies: Does not bruise/bleed easily.  "  Psychiatric/Behavioral: Negative for depression and suicidal ideas.       Consultants/Specialty  Psych  Neuro    Disposition  Inpatient     Quality Measures  Medications reviewed, EKG reviewed, Labs reviewed and Radiology images reviewed  Andrade catheter: No Andrade      DVT Prophylaxis: Enoxaparin (Lovenox)  DVT prophylaxis - mechanical: SCDs                Physical Exam       Filed Vitals:    08/04/17 0500 08/04/17 0515 08/04/17 0738 08/04/17 1152   BP:   104/48 102/62   Pulse:  66 73 81   Temp:   36.2 °C (97.1 °F) 36.6 °C (97.9 °F)   Resp:  14 17 19   Height: 1.6 m (5' 3\")      Weight:       SpO2:  96% 95% 95%     Body mass index is 46.07 kg/(m^2). Weight: 117.935 kg (260 lb)  Oxygen Therapy:  Pulse Oximetry: 95 %, O2 (LPM): 2, O2 Delivery: Nasal Cannula    Physical Exam   Constitutional: She is oriented to person, place, and time and well-developed, well-nourished, and in no distress.   HENT:   Head: Normocephalic and atraumatic.   Eyes: Pupils are equal, round, and reactive to light.   Neck: Neck supple. No tracheal deviation present.   Cardiovascular: Normal rate and regular rhythm.    Pulmonary/Chest: Breath sounds normal.   Abdominal: Soft. She exhibits no distension. There is no tenderness.   Musculoskeletal: She exhibits tenderness.   Neurological: She is alert and oriented to person, place, and time. She displays weakness. A sensory deficit is present. GCS score is 15.   Skin: Skin is warm and dry.         Lab Data Review:         8/4/2017  4:23 PM    Recent Labs      08/04/17   0113   SODIUM  139  139   POTASSIUM  4.0  4.0   CHLORIDE  105  105   CO2  26  26   BUN  13  13   CREATININE  0.68  0.68   MAGNESIUM  1.8   PHOSPHORUS  3.2   CALCIUM  9.5  9.5       Recent Labs      08/04/17   0113   ALTSGPT  40   ASTSGOT  22   ALKPHOSPHAT  80   TBILIRUBIN  0.4   GLUCOSE  142*  142*       Recent Labs      08/04/17   0113  08/04/17   0649   RBC  4.33  4.13*   HEMOGLOBIN  12.8  12.2   HEMATOCRIT  39.0  37.0 "   PLATELETCT  202  191   FERRITIN  33.9   --        Recent Labs      08/04/17   0113  08/04/17   0649   WBC  6.7  5.9   NEUTSPOLYS  53.90  50.80   LYMPHOCYTES  34.40  37.20   MONOCYTES  8.70  8.40   EOSINOPHILS  2.10  2.40   BASOPHILS  0.60  0.90   ASTSGOT  22   --    ALTSGPT  40   --    ALKPHOSPHAT  80   --    TBILIRUBIN  0.4   --            Assessment/Plan     Extremity Weakness/Pain/Spasm  - PMH unclear but apparently acute on chronic issue  - Follows neurology as outpt, described by them as conversion disorder, but per pt other neurology group follows her at [facility] and they believe that is not the case, she states they believe 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   - Multiple workups in past; CSF, Muscle Biopsy, Imaging, Neurophysiologic studies within system have been negative thus far   - Possible PMH Hashimoto's encephalopathy   - Neurology consulted, recommended brain MRI will order and await further recs  -MRI canceled due to pt having nerve stimulation device in back which is not MRI compatible     Accidental overdose (present on admission)  - Pt poor historian, possibly took 2x dose of all medications   - UDS positive for opiates and oxycodone, on Oxycodone and Fentanyl outpt   - PMH multiple psychiatric disorders including schizophrenia, borderline personality disorder, and possibly conversion disorder, currently denies SI/HI  - Vitals stable wnl since admission  - EKG was not indicative of pathology  - Holding home pain meds due to lethargy and recent OD, holding psych meds due to recent OD and unclear med rec  - Psych consulted, will await further recs  - Will continue to monitor vitals     Hypothyroidism  -Unclear PMH, pt states she possibly had Hashimoto's thyroiditis but that recently she was told she did not   -TSH 6  -Will restart home dose of Synthroid     HTN (hypertension) (present on admission)  - BP have been soft since admission  - On furosemide as outpt, unclear why, pt states it is because she  "\"holds on to a lot of fluid\" but could not expand on this further  - Holding home Lasix due to soft BPs for now  -Will continue to monitor     Dysuria  -UA not suggestive of UTI  -Wet prep negative  -Chlamydia/Gonorrhea testing pending   -Will continue to monitor     Schizophrenia  -Holding home Geodon due to overdose  -Psych consulted, awaiting recs    Impaired Glucose Tolerance  -Holding home Januvia  -Glucose has been only mildly elevated  -Holding SSI for now, will monitor BGs and restart if they rise   "

## 2017-08-04 NOTE — ASSESSMENT & PLAN NOTE
- pt states she accidentally overdosed after waking up because she was confused, she is not sure what she took but states she took at least 1 dose of each of her home meds, similar episode 2 weeks ago  - urine drug screen positive for opiates and oxycodone  - has nausea and and muscle cramps, denies LOC, SOB, chest pain, palpitations, abdominal pain, rash  - hx of multiple psychiatric disorders including possible conversion disorder  - vitals stable  - EKG shows slightly prolonged QTc which is her baseline, sinus rhythm       Plan  - home meds held  - psych consulted  - poison control not called because it was accidental OD and pt was A&OX3, vitals were stable, if deteriorates consider calling poison control  - monitor vitals

## 2017-08-04 NOTE — CARE PLAN
Problem: Mobility  Goal: Risk for activity intolerance will decrease  Outcome: PROGRESSING SLOWER THAN EXPECTED  Patient educated on need to get out of bed, patient declines at this time due to pain, patient turning frequently in bed and assisted with repositioning, patient verbalizes understanding of need to mobilize    Problem: Pain Management  Goal: Pain level will decrease to patient’s comfort goal  Outcome: PROGRESSING AS EXPECTED  Patient having 7-9/10 pain in back, hips and legs, patient repositioned and alternating heat and Ice packs for comfort

## 2017-08-04 NOTE — THERAPY
"Speech Language Therapy Clinical Swallow Evaluation completed.    Functional Status: Patient sleeping but awoke easily with cue. She was AAOx3 with confusion regarding day/month. Per patient report, she was being seen by a home health speech therapist for dysphagia and had a diagnostic swallow test performed sometime at the beginning of the year. Patient is reportedly on a pureed/thick liquid diet at baseline. Oral motor examination revealed reduced lingual, labial, and jaw strength and ROM. Laryngeal elevation and hyolaryngeal excursion palpated as weak. Patient presented with s/sx consistent with moderate oropharyngeal and esophageal dysphagia evidenced by reported globus sensation and s/sx concerning for aspiration. Patient tolerated NTL and pureed textures with no s/sx of aspiration or reported globus sensation when taking small, single sips and bites. Reported globus sensation with large bites of puree and trials of soft solids. Patient declining further trials of soft solids stating \"I can't eat it anymore. It is getting stuck in my throat.\" Immediate coughing/choking was observed with trials of thin liquids which is concerning for aspiration. Patient educated in regards to current status, s/sx of aspiration, risk of aspiration, and SLP recs. At this time, patient appears at the level for a D1/NTL (suspect baseline diet) with adherence to posted swallow strategies.     Recommendations - Diet: Dysphagia I, Nectar Thick Liquids                             Strategies: Monitor during meals, No Straws and Head of Bed at 90 Degrees                            Medication Administration: CRUSH medication in puree     Plan of Care: Will benefit from Speech Therapy 3 times per week    Post-Acute Therapy: Discharge to home with outpatient or home health for additional skilled therapy services.    See \"Rehab Therapy-Acute\" Patient Summary Report for complete documentation. Thank you for the consult.         "

## 2017-08-04 NOTE — SENIOR ADMIT NOTE
25 y/o F with multiple psychiatric disorders/come in with accidental overdose of home meds/has polypharmacy/multiple ED visits and admissions for similar problem/again presenting with Left leg paresis/per last dc summary conversion disorder was considered/vs stable, no major lab abnormality/patient awake and alert.     Assessment:   25 y/o with polypharmacy, comes in with accidental overdose (took medications twice).        Medication List      ASK your doctor about these medications       Instructions    aspirin EC 81 MG Tbec   Commonly known as:  ECOTRIN    Take 1 Tab by mouth every day.   Dose:  81 mg       baclofen 10 MG Tabs   Commonly known as:  LIORESAL    Take 1 Tab by mouth 3 times a day.   Dose:  10 mg       Cranberry 400 MG Tabs    Take 2 Tabs by mouth every day.   Dose:  2 Tab       doxycycline 100 MG Tabs   Commonly known as:  VIBRAMYCIN    Take 100 mg by mouth 2 times a day. Pt started on 7/23/2017 for 10 day course for cellulitis of breast   Dose:  100 mg       fentanyl 25 MCG/HR Pt72   Commonly known as:  DURAGESIC    Apply 1 Patch to skin as directed every 72 hours.   Dose:  1 Patch       fluoxetine 10 MG Caps   Commonly known as:  PROZAC    TAKE ONE CAPSULE BY MOUTH ONCE A DAY       fluticasone 50 MCG/ACT nasal spray   Commonly known as:  FLONASE    Spray 2 Sprays in nose every day.   Dose:  2 Spray       furosemide 40 MG Tabs   Commonly known as:  LASIX    Take 40 mg by mouth every day.   Dose:  40 mg       GRALISE 600 MG Tabs   Generic drug:  Gabapentin (Once-Daily)    Take 600 mg by mouth 3 times a day.   Dose:  600 mg       HM VITAMIN B12 500 MCG tablet   Generic drug:  cyanocobalamin    Take 500 mcg by mouth 2 times a day.   Dose:  500 mcg       ivabradine 5 MG Tabs tablet   Commonly known as:  CORLANOR    Take 1 Tab by mouth 2 times a day, with meals.   Dose:  5 mg       lactobacillus granules Pack    Take 1 Packet by mouth 3 times a day, with meals.   Dose:  1 Packet       lactulose 10  GM/15ML Soln    Take 10 g by mouth 2 times a day as needed (For constipation).   Dose:  10 g       levothyroxine 75 MCG Tabs   Commonly known as:  SYNTHROID    Take 75 mcg by mouth Every morning on an empty stomach.   Dose:  75 mcg       loratadine 10 MG Tabs   Commonly known as:  CLARITIN    Take 1 Tab by mouth every day.   Dose:  10 mg       Melatonin 5 MG Tabs    Doctor's comments:  Takes two tabs at Bedtime (10 mg.)   Take 10 mg by mouth every bedtime.   Dose:  10 mg       mupirocin 2 % Oint   Commonly known as:  BACTROBAN    Apply 1 Application to affected area(s) every day. Pt started on 7/23/2017 for cellulitis of breast   Dose:  1 Application       MYRBETRIQ 25 MG Tb24   Generic drug:  Mirabegron ER    Take 1 Tab by mouth every day.   Dose:  1 Tab       nitroGLYCERIN 0.3 MG SL tablet   Commonly known as:  NITROSTAT    PLACE 1 TABLET UNDER TONGUE IF NEEDED FOR CHEST PAIN EVERY 5 MINUTES FOR 3 DOSES AS DIRECTED       oxycodone-acetaminophen  MG Tabs   Commonly known as:  PERCOCET-10    Take 2 Tabs by mouth 2 Times a Day. Indications: Pain   Dose:  2 Tab       promethazine 25 MG Tabs   Commonly known as:  PHENERGAN    Take 1 Tab by mouth every 6 hours as needed for Nausea/Vomiting.   Dose:  25 mg       sitagliptin 100 MG Tabs   Commonly known as:  JANUVIA    Take 1 Tab by mouth every day.   Dose:  50 mg       sodium bicarbonate 325 MG Tabs    Take 2 Tabs by mouth 3 times a day.   Dose:  650 mg       tobramycin 0.3 % Soln ophthalmic solution   Commonly known as:  TOBREX    Place 2 Drops in both eyes every 4 hours. Pt started on 7/4/2017 for 7 day course for ear infection.   Dose:  2 Drop       vitamin D (Ergocalciferol) 26493 UNITS Caps capsule   Commonly known as:  DRISDOL    Take 50,000 Units by mouth every Friday.   Dose:  97822 Units       * ziprasidone 80 MG Caps   Commonly known as:  GEODON    TAKE 2 CAPSULES BY MOUTH NIGHTLY AT BEDTIME       * ziprasidone 40 MG Caps   Commonly known as:  GEODON     Take 190 mg by mouth every day. Geodon for hallucinations/schizophrenia   Dose:  190 mg       * ziprasidone 40 MG Caps   Commonly known as:  GEODON    One cap by mouth at noon after meal.       * ziprasidone 80 MG Caps   Commonly known as:  GEODON    Take 1 Cap by mouth 2 Times a Day.   Dose:  80 mg       * Notice:  This list has 4 medication(s) that are the same as other medications prescribed for you. Read the directions carefully, and ask your doctor or other care provider to review them with you.            Plan:  - admit to obs tele  - IVF  - monitor labs closely  - monitor vitals  - poison control not called; as unsure what quantity and what amount and patient's medication list is long.   - keep NPO   - psych consult placed  - monitor for withdrawals of psych meds; consider resuming  - psych to consider simplifying psych med list  - toxicology negative for asa/acetaminophen/alcohol  - no legal hold/patient denies si/hi  - aspiration/fall/seizures precautions    Please refer to Dr. Davis's note for more details.

## 2017-08-04 NOTE — PROGRESS NOTES
Med Rec completed per patient at bedside, she stated she is taking all the medications but could not tell me when she took last dose (day or time).  Allergies reviewed

## 2017-08-04 NOTE — ASSESSMENT & PLAN NOTE
- hx of limb weakness, spasms, and paresis  - as per chart review possible conversion disorder  - R leg more tense than L leg, strength 2/5 in L leg, 5/5 in R leg and upper extremities  - decreased sensation in the lower extremities bilaterally, hyporeflexsive

## 2017-08-04 NOTE — PROGRESS NOTES
Report received, pt care assumed, tele box on. VSS, pt assessment complete. Pt aaox 4, no signs of distress noted at this time. POC discussed with pt and verbalizes no questions. Pt c/o of 9/10 pain in back, legs,butt, PRN pain med not administered at this time, page put into MD about home meds, diet and pain meds/ muscle relaxers. Pt denies any additional needs at this time. Bed in lowest position, bed alarm on, pt educated on fall risk and verbalized understanding, call light within reach, will continue to monitor.

## 2017-08-04 NOTE — PROGRESS NOTES
Patient came up on the floor via gurney with transport, vitals stable, able to voice, needs, skin check done, med rec completed, callous noted to bottom of bilateral feet, foot drop noted, old spider bite to right breast, patient reports pain 9/10, states she has been dealing with chronic pain to back hips and legs for 10 years. No orders for pain medication. Patient is npo, patient stated its hard to swallow and food gets stuck in throat.patient on tele, speech eval ordered, tele in place, bed alarm in place

## 2017-08-04 NOTE — DIETARY
NUTRITION SERVICES: BMI - Pt with BMI >40 (=46.07). Weight loss counseling not appropriate in acute care setting. RECOMMEND - Referral to outpatient nutrition services for weight management after D/C. RD will monitor for diet advcancement.
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
## 2017-08-04 NOTE — ED NOTES
"Pt states, \"I have to pee now.\" RN asked pt what works best for her at home, pt states \"bedside commode or bedpan.\" When offered bedpan pt refusing to move stating \"my muscles are seizing, if you don't get someone to help you I won't move and I will just pee all over myself.\" Assist RN and this RN at bedside to place pt on bedpan.  "

## 2017-08-04 NOTE — PROGRESS NOTES
Called [facility] pharmacy to see what the current dosage of geodon she is prescribed- takes 160mg at bedtime and 40mg after lunch

## 2017-08-04 NOTE — PROGRESS NOTES
"Spoke with patient, she stated someone told her that she would need to be cleared by psychiatry and neurology before discharge. She stated she does not want to see Dr. Sandoval Pineda or his associate Dr. Marie as she and her family feel \"They are in cahoots\", also she stated that she has an important appointment that she can't miss on Monday at [facility] and needs to be out of the hospital before then  "

## 2017-08-04 NOTE — ED PROVIDER NOTES
ED Provider Note    Scribed for Brian Slater M.D. by Talisha Cedillo. 8/4/2017, 12:08 AM.    Primary care provider: Torres Brody M.D.  Means of arrival: Ambulance  History obtained from: Patient  History limited by: None    CHIEF COMPLAINT  Chief Complaint   Patient presents with   • Drug Overdose     BIB EMS, pt states she took an unknown quantity of unknown medications because she got confused about her medications. Per EMS the pt did this 2w ago as well, and it was believed she had a dystonic rxn. EMS gave 50mg benadryl IM. Pt axo x4. VSS   • Muscle Spasms     C/O Muscle cramps       HPI  Kristin Balderrama is a 26 y.o. female who presents to the Emergency Department via ambulance following an overdose of unknown medications prior to arrival. The patient states that she overdosed secondary to confusion when she tried to take her medications for muscle cramps. She notes that her medications are divided into two baggies per day and tonight she might have taken double the dosage of her normal medications. The patient endorses a recent medication change for increasing her Geodon dosage. She reports feeling improved after EMS provided 50mg of Benadryl.     REVIEW OF SYSTEMS  See HPI for further details. All other systems are negative.   C    PAST MEDICAL HISTORY   has a past medical history of Scoliosis; Multiple personality disorder; Chronic UTI (9/18/2010); Heart burn; Pain (08-15-12); History of falling; Sinus tachycardia (10/31/2013); Urinary incontinence; Hypertension; Disorder of thyroid; Obesity; Pneumonia (2012); ASTHMA; Breath shortness; Anginal syndrome; Psychosis; Arthritis; PCOS (polycystic ovarian syndrome); Gynecological disorder; Renal disorder; Arrhythmia; Urinary bladder disorder; Tuberculosis; Sleep apnea; Mitochondrial disease; and Fall.    SURGICAL HISTORY   has past surgical history that includes neuro dest facet l/s w/ig sngl (4/21/2015); recovery (1/27/2016); delmar by laparoscopy (8/29/2010);  "lumbar fusion anterior (8/21/2012); other cardiac surgery (1/2016); tonsillectomy; bowel stimulator insertion (7/13/2016); gastroscopy with balloon dilatation (N/A, 1/4/2017); muscle biopsy (Right, 1/26/2017); and other abdominal surgery.    SOCIAL HISTORY  Social History   Substance Use Topics   • Smoking status: Never Smoker    • Smokeless tobacco: Never Used   • Alcohol Use: No      History   Drug Use No   Lives with her grandmother    FAMILY HISTORY  Family History   Problem Relation Age of Onset   • Hypertension Mother    • Sleep Apnea Mother    • Heart Disease Mother    • Other Mother      hypothryod   • Hypertension Maternal Uncle    • Heart Disease Maternal Grandmother    • Hypertension Maternal Grandmother    • Other Sister      Narcolepsy;fibromyalsia;bone;nerve   • Genitourinary () Sister      endometriosis       CURRENT MEDICATIONS  Reviewed.  See Encounter Summary.     ALLERGIES  Allergies   Allergen Reactions   • Cefdinir Shortness of Breath and Itching     Tolerated 1/18/17   • Depakote [Divalproex Sodium] Unspecified     Muscle spasms/muscle pain and weakness     • Abilify Unspecified     Headaches/muscle twitching     • Amitriptyline Unspecified     Headaches     • Amoxicillin Rash     Pt states \"I get a rash\".     • Ciprofloxacin Rash     Pt states \"I get a rash\".     • Clindamycin Nausea     Even with food     • Ees [Erythromycin] Vomiting and Nausea   • Flagyl [Metronidazole Hcl] Unspecified     \"eye problems\"     • Flomax [Tamsulosin Hydrochloride] Swelling   • Metformin Unspecified     Increased lactic acid      • Sulfa Drugs Hives and Rash     RXN=since childhood   • Tape Rash     Tears skin off   coban with Tegaderm tape ok  RXN=ongoing   • Vancomycin Itching     Pt becomes flushed in face and chest.   RXN=7/10/16   • Cephalexin [Keflex] Rash     Pt states she gets a rash with this medication   • Erythromycin Rash     .   • Levofloxacin Unspecified     Leg muscle cramps   • Metronidazole " "Rash     .   • Valproic Acid Rash     .       PHYSICAL EXAM  VITAL SIGNS: Pulse 68  Temp(Src) 36.6 °C (97.9 °F)  Resp 14  Ht 1.6 m (5' 2.99\")  Wt 117.935 kg (260 lb)  BMI 46.07 kg/m2  SpO2 94%  Constitutional: Alert in no apparent distress.  HENT: No signs of trauma, Bilateral external ears normal, Nose normal.   Eyes: Pupils are equal and reactive, Conjunctiva normal, Non-icteric.   Neck: Normal range of motion, No tenderness, Supple, No stridor.   Lymphatic: No lymphadenopathy noted.   Cardiovascular: Regular rate and rhythm, no murmurs.   Thorax & Lungs: Normal breath sounds, No respiratory distress, No wheezing, No chest tenderness.   Abdomen: Bowel sounds normal, Soft, No tenderness, No masses, No pulsatile masses. No peritoneal signs.  Skin: Warm, Dry, No erythema, No rash.   Back: No bony tenderness, No CVA tenderness.   Extremities: Intact distal pulses, No edema, No tenderness, No cyanosis  Musculoskeletal: Good range of motion in all major joints. No tenderness to palpation or major deformities noted.   Neurologic: Alert , Patient will resist to passive extension of lower extremities, no resistance with passive flexion, Normal sensory function, No focal deficits noted. Finger to nose normal, heel to shin normal, cerebellar intact, cranial nerves II-XII intact, strength 5/5 and equal bilaterally, sensation intact throughout, cooperative, appropriate   Psychiatric: Affect normal, Judgment normal, Mood normal.     DIAGNOSTIC STUDIES / PROCEDURES     LABS  Results for orders placed or performed during the hospital encounter of 08/03/17   CBC WITH DIFFERENTIAL   Result Value Ref Range    WBC 6.7 4.8 - 10.8 K/uL    RBC 4.33 4.20 - 5.40 M/uL    Hemoglobin 12.8 12.0 - 16.0 g/dL    Hematocrit 39.0 37.0 - 47.0 %    MCV 90.1 81.4 - 97.8 fL    MCH 29.6 27.0 - 33.0 pg    MCHC 32.8 (L) 33.6 - 35.0 g/dL    RDW 42.1 35.9 - 50.0 fL    Platelet Count 202 164 - 446 K/uL    MPV 11.7 9.0 - 12.9 fL    Neutrophils-Polys " 53.90 44.00 - 72.00 %    Lymphocytes 34.40 22.00 - 41.00 %    Monocytes 8.70 0.00 - 13.40 %    Eosinophils 2.10 0.00 - 6.90 %    Basophils 0.60 0.00 - 1.80 %    Immature Granulocytes 0.30 0.00 - 0.90 %    Nucleated RBC 0.00 /100 WBC    Neutrophils (Absolute) 3.61 2.00 - 7.15 K/uL    Lymphs (Absolute) 2.30 1.00 - 4.80 K/uL    Monos (Absolute) 0.58 0.00 - 0.85 K/uL    Eos (Absolute) 0.14 0.00 - 0.51 K/uL    Baso (Absolute) 0.04 0.00 - 0.12 K/uL    Immature Granulocytes (abs) 0.02 0.00 - 0.11 K/uL    NRBC (Absolute) 0.00 K/uL   Blood Alcohol   Result Value Ref Range    Diagnostic Alcohol 0.00 0.00 g/dL   Acetaminophen Level   Result Value Ref Range    Acetaminophen -Tylenol <10 10 - 30 ug/mL   SALICYLATE LEVEL   Result Value Ref Range    Salicylates, Quant. 0 (L) 15 - 25 mg/dL   BASIC METABOLIC PANEL   Result Value Ref Range    Sodium 139 135 - 145 mmol/L    Potassium 4.0 3.6 - 5.5 mmol/L    Chloride 105 96 - 112 mmol/L    Co2 26 20 - 33 mmol/L    Glucose 142 (H) 65 - 99 mg/dL    Bun 13 8 - 22 mg/dL    Creatinine 0.68 0.50 - 1.40 mg/dL    Calcium 9.5 8.5 - 10.5 mg/dL    Anion Gap 8.0 0.0 - 11.9   URINE DRUG SCREEN   Result Value Ref Range    Amphetamines Urine Negative Negative    Barbiturates Negative Negative    Benzodiazepines Negative Negative    Cocaine Metabolite Negative Negative    Methadone Negative Negative    Opiates Positive (A) Negative    Oxycodone Positive (A) Negative    Phencyclidine -Pcp Negative Negative    Propoxyphene Negative Negative    Cannabinoid Metab Negative Negative   MAGNESIUM   Result Value Ref Range    Magnesium 1.8 1.5 - 2.5 mg/dL   PHOSPHORUS   Result Value Ref Range    Phosphorus 3.2 2.5 - 4.5 mg/dL   IONIZED CALCIUM   Result Value Ref Range    Ionized Calcium 1.2 1.1 - 1.3 mmol/L   WET PREP   Result Value Ref Range    Significant Indicator NEG     Source GEN     Site VAGINAL     Wet Prep For Parasites       No yeast.  No motile Trichomonas seen.  No clue cells seen.  No WBC's seen.      CHLAMYDIA & GC BY PCR   Result Value Ref Range    Source Urine    ESTIMATED GFR   Result Value Ref Range    GFR If African American >60 >60 mL/min/1.73 m 2    GFR If Non African American >60 >60 mL/min/1.73 m 2   URINALYSIS   Result Value Ref Range    Color Yellow     Character Clear     Specific Gravity 1.030 <1.035    Ph 6.0 5.0-8.0    Glucose Negative Negative mg/dL    Ketones Negative Negative mg/dL    Protein Negative Negative mg/dL    Bilirubin Negative Negative    Urobilinogen, Urine 1.0 Negative    Nitrite Negative Negative    Leukocyte Esterase Negative Negative    Occult Blood Negative Negative    Micro Urine Req see below    EKG (ER)   Result Value Ref Range    Report       [facility].    Test Date:  2017  Pt Name:    HARLEY DE LA CRUZ              Department: ER  MRN:        [id number]                      Room:       [facility]  Gender:     F                            Technician: [id number]  :        [id number]                   Requested By:CUONG PARKER  Order #:    [id number]                    Reading MD:    Measurements  Intervals                                Axis  Rate:       68                           P:          36  FL:         152                          QRS:        46  QRSD:       92                           T:          8  QT:         464  QTc:        494    Interpretive Statements  SINUS RHYTHM  BORDERLINE T ABNORMALITIES, ANTERIOR LEADS  BORDERLINE PROLONGED QT INTERVAL  Compared to ECG 2017 10:59:59  T-wave abnormality now present  Intraventricular conduction delay no longer present  Inferior Q waves no longer present  Q waves no longer present       *Note: Due to a large number of results and/or encounters for the requested time period, some results have not been displayed. A complete set of results can be found in Results Review.     All labs were reviewed by me.    EKG  12 Lead EKG interpreted by me to show:  Sinus rhythm  Rate 68  Axis:  Normal  Intervals: Normal  No acute ST-T wave changes  QTc of 494    COURSE & MEDICAL DECISION MAKING  Pertinent Labs & Imaging studies reviewed. (See chart for details)    12:19 AM - Patient seen and examined at bedside. Ordered Blood alcohol, Acetaminophen level, Salicylate level, BMP, Urine drug screen, Magnesium, Phosphorus, Ionized calcium, CBC, EKG to evaluate her symptoms.     3:04 AM Paged [facility] Internal [facility].     Decision Making:  This is a 26 y.o. year old female who presents with complaint of possible drug overdose. Patient has long history both medical and psychiatric illness. She says that she has recently had an axonal overdose by taking too much of her medications. She believes that she may have been that she had again tonight. The paramedics prior to arrival question if there was a dystonic reaction and did give her Benadryl which the patient does believe improved her symptoms. Lab evaluation otherwise unremarkable tonight. EKG does not show any concerning findings. Given the significant difficulty history is provided by the patient given her complex medical history and being a poor historian I do believe he'll be important that the patient under ongoing observation given lack of information regarding what medications if any she took. Additionally I do believe that they'll need to be further insight into the patient's current living situation and it does not appear that she will be able to take care of herself and it does not appear that she has great oversight by her family to which she resides with. I discussed the case with the internal medicine team which is agreeable to ongoing inpatient care at this time.    DISPOSITION:  Patient will be admitted to [facility] in guarded condition.    FINAL IMPRESSION  1. Overdose, accidental or unintentional, initial encounter          Talisha RODRIGUEZ (Scribsadie), am scribing for, and in the presence of, Brian Slater M.D..    Electronically signed by: Talisha  Nguyen (Scribe), 8/4/2017    IBrian M.D. personally performed the services described in this documentation, as scribed by Talisha Cedillo in my presence, and it is both accurate and complete.    The note accurately reflects work and decisions made by me.  Brian Slater  8/4/2017  4:18 AM

## 2017-08-04 NOTE — H&P
Internal Medicine Admitting History and Physical    Name Kristin Balderrama     1990   Age/Sex 26 y.o. female   MRN [id number]   Code Status Full     After 5PM or if no immediate response to page, please call for cross-coverage  Attending/Team: Lyle/ Seth See Patient List for primary contact information  Call [phone] to page    1st Call - Day Intern (R1):   Ryan 2nd Call - Day Sr. Resident (R2/R3):   Michael Jordan       Chief Complaint:  Overdose    HPI:  Pt is a 25 y/o F with a pmh of multiple psychiatric disorders including possible conversion disorder, HTN, who presents with accidental OD. Pt states after waking up from sleep she had muscle cramps and went to take her meds for it but was feeling very confused and took all her meds at once. Her daily meds are divided into 2 baggies and she knows she took more than 2 baggies, but isn't sure how many. She says this also happened 2 weeks ago, she was very confused after waking up and took 2 baggies of her meds at once. She says she felt better after EMS gave her 50mg of Benadryl. Pt currently complaining of jaw spasms and L leg spasms. She is able to open and close her jaw and it isn't too tender, but her L leg is tense and painful since taking all her meds. She was able to walk at home prior to EMS arriving, but doesn't know why she has paresis in her L leg now. Chart review shows she has presented with similar complaints of weakness and spasms in her limbs which may be attributed to conversion disorder. She is mildly nauseous, denies LOC, chest pain, SOB, coughing, abdominal pain, diarrhea, constipation, rash.      Review of Systems   Constitutional: Negative.  Negative for fever and chills.   HENT: Negative.  Negative for sore throat.    Eyes: Negative.  Negative for blurred vision.   Respiratory: Negative.  Negative for cough and shortness of breath.    Cardiovascular: Negative.  Negative for chest pain, palpitations and leg swelling.  "  Gastrointestinal: Positive for nausea. Negative for heartburn, vomiting, abdominal pain, diarrhea and constipation.   Genitourinary: Positive for dysuria. Negative for urgency.   Musculoskeletal:        L leg spasms and tenderness   Skin: Negative.  Negative for rash.   Neurological: Negative.  Negative for dizziness and headaches.   Endo/Heme/Allergies: Negative.    Psychiatric/Behavioral: Positive for memory loss. Negative for depression, suicidal ideas, hallucinations and substance abuse. The patient is nervous/anxious.              Past Medical History:   Past Medical History   Diagnosis Date   • Scoliosis    • Multiple personality disorder    • Chronic UTI 9/18/2010   • Heart burn    • Pain 08-15-12     back, 7/10   • History of falling    • Sinus tachycardia 10/31/2013   • Urinary incontinence    • Hypertension    • Disorder of thyroid    • Obesity    • Pneumonia 2012   • ASTHMA      when around smoke   • Breath shortness      with tachycardia   • Anginal syndrome      random chest pain especially with tachycardia   • Psychosis    • Arthritis      osteo   • PCOS (polycystic ovarian syndrome)    • Gynecological disorder      PCOS   • Renal disorder      \"kidney disease, stage 1\" nephrologist, Dr. Vallejo   • Arrhythmia      \"sinus tachycardia\", cariologist, Dr. 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   • Sleep apnea      CPAP \"pulmonary doctor took me off mid year 2016\"   • Mitochondrial disease    • Fall        Past Surgical History:  Past Surgical History   Procedure Laterality Date   • Neuro dest facet l/s w/ig sngl  4/21/2015     Performed by Reza Tabor at [facility]   • Recovery  1/27/2016     Procedure: CATH LAB EP STUDY WITH SINUS NODE MODIFICATION ABHINAV;  Surgeon: [facility];  Location: SURGERY PRE-POST PROC UNIT [facility];  Service:    • Katie by laparoscopy  8/29/2010     Performed by " "SHAYY JOHNS at SURGERY [facility]   • Lumbar fusion anterior  8/21/2012     Performed by SUSIE SAWANT at SURGERY [facility]   • Other cardiac surgery  1/2016     cardiac ablation   • Tonsillectomy       tonsillectomy   • Bowel stimulator insertion  7/13/2016     Procedure: BOWEL STIMULATOR INSERTION FOR PERMANENT INTERSTIM SACRAL IMPLANT;  Surgeon: Joe Noyola M.D.;  Location: SURGERY [facility];  Service:    • Gastroscopy with balloon dilatation N/A 1/4/2017     Procedure: GASTROSCOPY WITH DILATATION;  Surgeon: Torres Vargas M.D.;  Location: SURGERY [facility];  Service:    • Muscle biopsy Right 1/26/2017     Procedure: MUSCLE BIOPSY - THIGH;  Surgeon: Isidro Vigil M.D.;  Location: SURGERY [facility];  Service:    • Other abdominal surgery         Current Outpatient Medications:  Home Medications     **Home medications have not yet been reviewed for this encounter**          Medication Allergy/Sensitivities:  Allergies   Allergen Reactions   • Cefdinir Shortness of Breath and Itching     Tolerated 1/18/17   • Depakote [Divalproex Sodium] Unspecified     Muscle spasms/muscle pain and weakness     • Abilify Unspecified     Headaches/muscle twitching     • Amitriptyline Unspecified     Headaches     • Amoxicillin Rash     Pt states \"I get a rash\".     • Ciprofloxacin Rash     Pt states \"I get a rash\".     • Clindamycin Nausea     Even with food     • Ees [Erythromycin] Vomiting and Nausea   • Flagyl [Metronidazole Hcl] Unspecified     \"eye problems\"     • Flomax [Tamsulosin Hydrochloride] Swelling   • Metformin Unspecified     Increased lactic acid      • Sulfa Drugs Hives and Rash     RXN=since childhood   • Tape Rash     Tears skin off   coban with Tegaderm tape ok  RXN=ongoing   • Vancomycin Itching     Pt becomes flushed in face and chest.   RXN=7/10/16   • Cephalexin [Keflex] Rash     Pt states she gets a rash with this medication   • Erythromycin Rash     .   • " "Levofloxacin Unspecified     Leg muscle cramps   • Metronidazole Rash     .   • Valproic Acid Rash     .         Family History:  Family History   Problem Relation Age of Onset   • Hypertension Mother    • Sleep Apnea Mother    • Heart Disease Mother    • Other Mother      hypothryod   • Hypertension Maternal Uncle    • Heart Disease Maternal Grandmother    • Hypertension Maternal Grandmother    • Other Sister      Narcolepsy;fibromyalsia;bone;nerve   • Genitourinary () Sister      endometriosis       Social History:  Social History     Social History   • Marital Status: Single     Spouse Name: N/A   • Number of Children: N/A   • Years of Education: N/A     Occupational History   • Not on file.     Social History Main Topics   • Smoking status: Never Smoker    • Smokeless tobacco: Never Used   • Alcohol Use: No   • Drug Use: No   • Sexual Activity: Not Currently     Birth Control/ Protection: Pill     Other Topics Concern   • Not on file     Social History Narrative    ** Merged History Encounter **          Living situation: at home  PCP :       Physical Exam     Filed Vitals:    08/04/17 0400 08/04/17 0415 08/04/17 0430 08/04/17 0515   Pulse: 71 70 71 66   Temp:       Resp:    14   Height:       Weight:       SpO2: 97% 96% 97% 96%     Body mass index is 46.07 kg/(m^2).  Pulse 66  Temp(Src) 36.6 °C (97.9 °F)  Resp 14  Ht 1.6 m (5' 2.99\")  Wt 117.935 kg (260 lb)  BMI 46.07 kg/m2  SpO2 96%  O2 therapy: Pulse Oximetry: 96 %    Physical Exam   Constitutional: She is oriented to person, place, and time. Vital signs are normal.   Obese   HENT:   Head: Normocephalic and atraumatic.   Mouth/Throat: Oropharynx is clear and moist.   Eyes: Conjunctivae and EOM are normal. Pupils are equal, round, and reactive to light.   Neck: Normal range of motion. No JVD present.   Cardiovascular: Normal rate, regular rhythm and normal heart sounds.    No murmur heard.  Pulmonary/Chest: Effort normal and breath sounds normal. No " respiratory distress. She has no wheezes.   Abdominal: Soft. Bowel sounds are normal. She exhibits no distension. There is no tenderness. There is no rebound.   Lymphadenopathy:     She has no cervical adenopathy.   Neurological: She is alert and oriented to person, place, and time. She has intact cranial nerves.   5/5 strength in the upper extremities bilaterally, jaw strength intact, 5/5 strength in R lower extremity, 2/5 strength in L lower extremity, no sensation in the lower extremities bilaterally, DTR's hyporeflexia    Skin: Skin is warm. No erythema.   Psychiatric: Affect normal. Her mood appears anxious. She does not exhibit a depressed mood. She expresses no homicidal and no suicidal ideation.             Data Review       Old Records Request:   Completed  Current Records review and summary: Completed    Lab Data Review:  Recent Results (from the past 24 hour(s))   EKG (ER)    Collection Time: 17  1:03 AM   Result Value Ref Range    Report       [facility] Emergency Dept[facility]    Test Date:  2017  Pt Name:    HARLEY DE LA CRUZ              Department: ER  MRN:        [id number]                      Room:       [facility]  Gender:     F                            Technician: [id number]  :        1990                   Requested By:CUONG PARKER  Order #:    [id number]                    Reading MD:    Measurements  Intervals                                Axis  Rate:       68                           P:          36  ID:         [id number]                          QRS:        46  QRSD:       92                           T:          8  QT:         464  QTc:        494    Interpretive Statements  SINUS RHYTHM  BORDERLINE T ABNORMALITIES, ANTERIOR LEADS  BORDERLINE PROLONGED QT INTERVAL  Compared to ECG 2017 10:59:59  T-wave abnormality now present  Intraventricular conduction delay no longer present  Inferior Q waves no longer present  Q waves no longer present     CBC WITH DIFFERENTIAL     Collection Time: 08/04/17  1:13 AM   Result Value Ref Range    WBC 6.7 4.8 - 10.8 K/uL    RBC 4.33 4.20 - 5.40 M/uL    Hemoglobin 12.8 12.0 - 16.0 g/dL    Hematocrit 39.0 37.0 - 47.0 %    MCV 90.1 81.4 - 97.8 fL    MCH 29.6 27.0 - 33.0 pg    MCHC 32.8 (L) 33.6 - 35.0 g/dL    RDW 42.1 35.9 - 50.0 fL    Platelet Count 202 164 - 446 K/uL    MPV 11.7 9.0 - 12.9 fL    Neutrophils-Polys 53.90 44.00 - 72.00 %    Lymphocytes 34.40 22.00 - 41.00 %    Monocytes 8.70 0.00 - 13.40 %    Eosinophils 2.10 0.00 - 6.90 %    Basophils 0.60 0.00 - 1.80 %    Immature Granulocytes 0.30 0.00 - 0.90 %    Nucleated RBC 0.00 /100 WBC    Neutrophils (Absolute) 3.61 2.00 - 7.15 K/uL    Lymphs (Absolute) 2.30 1.00 - 4.80 K/uL    Monos (Absolute) 0.58 0.00 - 0.85 K/uL    Eos (Absolute) 0.14 0.00 - 0.51 K/uL    Baso (Absolute) 0.04 0.00 - 0.12 K/uL    Immature Granulocytes (abs) 0.02 0.00 - 0.11 K/uL    NRBC (Absolute) 0.00 K/uL   Blood Alcohol    Collection Time: 08/04/17  1:13 AM   Result Value Ref Range    Diagnostic Alcohol 0.00 0.00 g/dL   Acetaminophen Level    Collection Time: 08/04/17  1:13 AM   Result Value Ref Range    Acetaminophen -Tylenol <10 10 - 30 ug/mL   SALICYLATE LEVEL    Collection Time: 08/04/17  1:13 AM   Result Value Ref Range    Salicylates, Quant. 0 (L) 15 - 25 mg/dL   BASIC METABOLIC PANEL    Collection Time: 08/04/17  1:13 AM   Result Value Ref Range    Sodium 139 135 - 145 mmol/L    Potassium 4.0 3.6 - 5.5 mmol/L    Chloride 105 96 - 112 mmol/L    Co2 26 20 - 33 mmol/L    Glucose 142 (H) 65 - 99 mg/dL    Bun 13 8 - 22 mg/dL    Creatinine 0.68 0.50 - 1.40 mg/dL    Calcium 9.5 8.5 - 10.5 mg/dL    Anion Gap 8.0 0.0 - 11.9   MAGNESIUM    Collection Time: 08/04/17  1:13 AM   Result Value Ref Range    Magnesium 1.8 1.5 - 2.5 mg/dL   PHOSPHORUS    Collection Time: 08/04/17  1:13 AM   Result Value Ref Range    Phosphorus 3.2 2.5 - 4.5 mg/dL   IONIZED CALCIUM    Collection Time: 08/04/17  1:13 AM   Result Value Ref Range     Ionized Calcium 1.2 1.1 - 1.3 mmol/L   ESTIMATED GFR    Collection Time: 08/04/17  1:13 AM   Result Value Ref Range    GFR If African American >60 >60 mL/min/1.73 m 2    GFR If Non African American >60 >60 mL/min/1.73 m 2   URINE DRUG SCREEN    Collection Time: 08/04/17  1:29 AM   Result Value Ref Range    Amphetamines Urine Negative Negative    Barbiturates Negative Negative    Benzodiazepines Negative Negative    Cocaine Metabolite Negative Negative    Methadone Negative Negative    Opiates Positive (A) Negative    Oxycodone Positive (A) Negative    Phencyclidine -Pcp Negative Negative    Propoxyphene Negative Negative    Cannabinoid Metab Negative Negative   URINALYSIS    Collection Time: 08/04/17  1:29 AM   Result Value Ref Range    Color Yellow     Character Clear     Specific Gravity 1.030 <1.035    Ph 6.0 5.0-8.0    Glucose Negative Negative mg/dL    Ketones Negative Negative mg/dL    Protein Negative Negative mg/dL    Bilirubin Negative Negative    Urobilinogen, Urine 1.0 Negative    Nitrite Negative Negative    Leukocyte Esterase Negative Negative    Occult Blood Negative Negative    Micro Urine Req see below    WET PREP    Collection Time: 08/04/17  1:40 AM   Result Value Ref Range    Significant Indicator NEG     Source GEN     Site VAGINAL     Wet Prep For Parasites       No yeast.  No motile Trichomonas seen.  No clue cells seen.  No WBC's seen.     CHLAMYDIA & GC BY PCR    Collection Time: 08/04/17  1:40 AM   Result Value Ref Range    Source Urine        Imaging/Procedures Review:    ndependant Imaging Review: Completed  No orders to display      EKG:   EKG Independant Review: Completed  QTc:494, HR: 68, Normal Sinus Rhythm, no ST/T changes     Records reviewed and summarized in current documentation             Assessment/Plan     * Accidental overdose (present on admission)  Assessment & Plan  - pt states she accidentally overdosed after waking up because she was confused, she is not sure what she  took but states she took at least 1 dose of each of her home meds, similar episode 2 weeks ago  - urine drug screen positive for opiates and oxycodone  - has nausea and and muscle cramps, denies LOC, SOB, chest pain, palpitations, abdominal pain, rash  - hx of multiple psychiatric disorders including possible conversion disorder  - vitals stable  - EKG shows slightly prolonged QTc which is her baseline, sinus rhythm       Plan  - home meds held  - psych consulted  - poison control not called because it was accidental OD and pt was A&OX3, vitals were stable, if deteriorates consider calling poison control  - monitor vitals    Right leg weakness (present on admission)  Assessment & Plan  - hx of limb weakness, spasms, and paresis  - as per chart review possible conversion disorder  - R leg more tense than L leg, strength 2/5 in L leg, 5/5 in R leg and upper extremities  - decreased sensation in the lower extremities bilaterally, hyporeflexsive     HTN (hypertension) (present on admission)  Assessment & Plan  - BP in /60  - on furosemide   - vitals stable, home meds held  - monitor vitals            Anticipated Hospital stay: Observation admit        Quality Measures  Radiology images reviewed, EKG reviewed, Labs reviewed and Medications reviewed  Andrade catheter: No Andrade      DVT Prophylaxis: Enoxaparin (Lovenox)

## 2017-08-04 NOTE — PROGRESS NOTES
Dr. Davis updated about patient preferences for neurologist, and appointment Monday at [facility]

## 2017-08-05 VITALS
HEIGHT: 63 IN | WEIGHT: 274.69 LBS | SYSTOLIC BLOOD PRESSURE: 130 MMHG | BODY MASS INDEX: 48.67 KG/M2 | TEMPERATURE: 97.4 F | DIASTOLIC BLOOD PRESSURE: 66 MMHG | RESPIRATION RATE: 17 BRPM | OXYGEN SATURATION: 99 % | HEART RATE: 97 BPM

## 2017-08-05 LAB — GLUCOSE BLD-MCNC: 108 MG/DL (ref 65–99)

## 2017-08-05 PROCEDURE — 665998 HH PPS REVENUE CREDIT

## 2017-08-05 PROCEDURE — 700102 HCHG RX REV CODE 250 W/ 637 OVERRIDE(OP): Performed by: STUDENT IN AN ORGANIZED HEALTH CARE EDUCATION/TRAINING PROGRAM

## 2017-08-05 PROCEDURE — 665999 HH PPS REVENUE DEBIT

## 2017-08-05 PROCEDURE — G0378 HOSPITAL OBSERVATION PER HR: HCPCS

## 2017-08-05 PROCEDURE — A9270 NON-COVERED ITEM OR SERVICE: HCPCS | Performed by: STUDENT IN AN ORGANIZED HEALTH CARE EDUCATION/TRAINING PROGRAM

## 2017-08-05 PROCEDURE — 99220 PR INITIAL OBSERVATION CARE,LEVL III: CPT | Mod: AI,GC | Performed by: INTERNAL MEDICINE

## 2017-08-05 PROCEDURE — 700105 HCHG RX REV CODE 258: Performed by: HOSPITALIST

## 2017-08-05 PROCEDURE — 82962 GLUCOSE BLOOD TEST: CPT

## 2017-08-05 RX ADMIN — LEVOTHYROXINE SODIUM 75 MCG: 75 TABLET ORAL at 06:09

## 2017-08-05 RX ADMIN — SODIUM CHLORIDE: 9 INJECTION, SOLUTION INTRAVENOUS at 01:24

## 2017-08-05 ASSESSMENT — PAIN SCALES - GENERAL: PAINLEVEL_OUTOF10: 8

## 2017-08-05 NOTE — PROGRESS NOTES
Bedside report completed.  Assumed pt care. Patient in bed, resting, in no apparent distress.  Safety precautions in place. Call light & personal belongings within reach.  Plan of care discussed.  Patient reports 9/10 pain, non pharmacological measures in place.  Ordering kpack for patient for heat therapy.  Patient is on 2L o2, IV is running NS @ 100 mL/hour.  No needs expressed at this time.  Will continue to monitor.

## 2017-08-05 NOTE — CARE PLAN
Problem: Communication  Goal: The ability to communicate needs accurately and effectively will improve  Outcome: PROGRESSING AS EXPECTED  Intervention: [place] patient and significant other/support system to call light to alert staff of needs  Patient oriented to surroundings and unit policies/routines.  Educated and understands the use of the call button.  Patient calls appropriately.      Problem: Infection  Goal: Will remain free from infection  Outcome: PROGRESSING AS EXPECTED  Intervention: Implement standard precautions and perform hand washing before and after patient contact  Patient educated and understands the importance of proper hand hygiene for herself, her visitors, and her care team.

## 2017-08-05 NOTE — PROGRESS NOTES
Patient upset about not receiving all of her meds.  States that she is in pain and her muscles are seizing/spasming.  Page MD, came to see patient.  AMA paperwork signed, patient stated that she understands the risks, IV and tele box removed.

## 2017-08-05 NOTE — PROGRESS NOTES
Patient awake to use commode.  States her pain is 8/10, still requesting pain medications and zofran for nausea.  Spoke with MD regarding.  No other needs expressed at this time.  K pad placed on bed for patient comfort and pain relief.  Will continue to monitor.

## 2017-08-05 NOTE — PROGRESS NOTES
Kristin Balderrama patient has chosen to leave the hospital against medical advice. The attending physician has not discharged the patient. Patient is not a risk to himself or others. I have discussed with the patient the following:  Physician has not determined patient is ready for discharge, Risks and consequences of leaving the hospital too soon and Benefit of continued hospitalization.      Discharge against medical advice form has been Signed.        Attending physician has been notified.

## 2017-08-05 NOTE — CONSULTS
"       NEUROLOGY CONSULT         CHIEF COMPLAINT:  Chief Complaint   Patient presents with   • Drug Overdose     BIB EMS, pt states she took an unknown quantity of unknown medications because she got confused about her medications. Per EMS the pt did this 2w ago as well, and it was believed she had a dystonic rxn. EMS gave 50mg benadryl IM. Pt axo x4. VSS   • Muscle Spasms     C/O Muscle cramps         Date of Service:  08/04/2017    REQUESTING PHYSICIAN: Kadeem Alvarenga M.D.,       HISTORY OF PRESENT ILLNESS:  Ms. Balderrama is a 26 year old female with a long history of episodes of lower extremity weakness followed by Dr. Stevenson who was brougth to the ED this morning because of an accidental overdose of pain medications.  Her prior admission and office notes from Dr. Stevenson were reviewed. She's had muliple epsiodes of lower extremity weakness and has had negative MRI's of the spine as well as brain and a negative EMG/NCS and muscle biopsy.  She does have a history of an anterior lumbar fusion and also has had a bowel stimulator placed. She has chronic pain and apparently overdosed accidentally as she denies any suicidal or homocidal ideation. However, she now has severe spasms in her left leg so that she can't move it which she attributes to her leg \"being tired\" from the overdose.  In the past, her lower extremity weakness has improved with IV steroids, and her last burst of steroids was several months ago.            ROS:  Positive review of systems are in BOLD  Constitutional: no fevers, chills, night sweats, weight loss  HEENT: no headache, blurred vision, loss of vision, double vision, congestion, sore throat, rhinorrhea, dyssphagia, edema   Chest: no SOB, cough, wheezing,  Heart: no chest pain, palpitation, orthopnea,  Abd: no abdominal pain, nausea, vomiting, diarrrhea, constipation,   Genitourinary: no hematuria, urinary frequency or urgency, bladder or bowel incontinence.   Extremities: no joint pain, muscle " "pain, back pain, neck pain,   Hematological: no epistaxis, easy bruising, petechia  Neurological: please see history of present illness  Psychiatric: no anxiety, depression  Extremities: no swelling, joint pain,   Endocrine: no polyuria, polydipsea, heat or cold intolerance, weight gain, weight loss  Skin: no lesions, rashes.       PAST MEDICAL HISTORY:  Past Medical History   Diagnosis Date   • Scoliosis    • Multiple personality disorder    • Chronic UTI 9/18/2010   • Heart burn    • Pain 08-15-12     back, 7/10   • History of falling    • Sinus tachycardia 10/31/2013   • Urinary incontinence    • Hypertension    • Disorder of thyroid    • Obesity    • Pneumonia 2012   • ASTHMA      when around smoke   • Breath shortness      with tachycardia   • Anginal syndrome      random chest pain especially with tachycardia   • Psychosis    • Arthritis      osteo   • PCOS (polycystic ovarian syndrome)    • Gynecological disorder      PCOS   • Renal disorder      \"kidney disease, stage 1\" nephrologist, Dr. Vallejo   • Arrhythmia      \"sinus tachycardia\", cariologist, Dr. 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   • Sleep apnea      CPAP \"pulmonary doctor took me off mid year 2016\"   • Mitochondrial disease    • Fall          ALLERGIES:  Allergies   Allergen Reactions   • Cefdinir Shortness of Breath and Itching     Tolerated 1/18/17   • Depakote [Divalproex Sodium] Unspecified     Muscle spasms/muscle pain and weakness     • Abilify Unspecified     Headaches/muscle twitching     • Amitriptyline Unspecified     Headaches     • Amoxicillin Rash     Pt states \"I get a rash\".     • Ciprofloxacin Rash     Pt states \"I get a rash\".     • Clindamycin Nausea     Even with food     • Ees [Erythromycin] Vomiting and Nausea   • Flagyl [Metronidazole Hcl] Unspecified     \"eye problems\"     • Flomax [Tamsulosin Hydrochloride] Swelling   • " Metformin Unspecified     Increased lactic acid      • Sulfa Drugs Hives and Rash     RXN=since childhood   • Tape Rash     Tears skin off   coban with Tegaderm tape ok  RXN=ongoing   • Vancomycin Itching     Pt becomes flushed in face and chest.   RXN=7/10/16   • Cephalexin [Keflex] Rash     Pt states she gets a rash with this medication   • Erythromycin Rash     .   • Levofloxacin Unspecified     Leg muscle cramps   • Metronidazole Rash     .   • Valproic Acid Rash     .         MEDICATIONS:  No current facility-administered medications on file prior to encounter.     Current Outpatient Prescriptions on File Prior to Encounter   Medication Sig Dispense Refill   • fluticasone (FLONASE) 50 MCG/ACT nasal spray Spray 2 Sprays in nose every day. 16 g 11   • loratadine (CLARITIN) 10 MG Tab Take 1 Tab by mouth every day. 30 Tab    • ziprasidone (GEODON) 40 MG Cap One cap by mouth at noon after meal. 30 Cap 2   • oxycodone-acetaminophen (PERCOCET-10)  MG Tab Take 2 Tabs by mouth 2 Times a Day. Indications: Pain     • doxycycline (VIBRAMYCIN) 100 MG Tab Take 100 mg by mouth 2 times a day. Pt started on 7/23/2017 for 10 day course for cellulitis of breast     • mupirocin (BACTROBAN) 2 % Ointment Apply 1 Application to affected area(s) every day. Pt started on 7/23/2017 for cellulitis of breast     • tobramycin (TOBREX) 0.3 % Solution ophthalmic solution Place 2 Drops in both eyes every 4 hours. Pt started on 7/4/2017 for 7 day course for ear infection.     • sitagliptin (JANUVIA) 100 MG Tab Take 1 Tab by mouth every day. 30 Tab 6   • ziprasidone (GEODON) 80 MG Cap TAKE 2 CAPSULES BY MOUTH NIGHTLY AT BEDTIME 60 Cap 5   • GRALISE 600 MG Tab Take 600 mg by mouth 3 times a day.     • nitroGLYCERIN (NITROSTAT) 0.3 MG SL tablet PLACE 1 TABLET UNDER TONGUE IF NEEDED FOR CHEST PAIN EVERY 5 MINUTES FOR 3 DOSES AS DIRECTED  0   • Mirabegron ER (MYRBETRIQ) 25 MG TABLET SR 24 HR Take 1 Tab by mouth every day.     • lactobacillus  granules (LACTINEX/FLORANEX) Pack Take 1 Packet by mouth 3 times a day, with meals.     • levothyroxine (SYNTHROID) 75 MCG Tab Take 75 mcg by mouth Every morning on an empty stomach.     • lactulose 10 GM/15ML Solution Take 10 g by mouth 2 times a day as needed (For constipation).     • furosemide (LASIX) 40 MG Tab Take 40 mg by mouth every day.     • Cranberry 400 MG Tab Take 2 Tabs by mouth every day.     • promethazine (PHENERGAN) 25 MG Tab Take 1 Tab by mouth every 6 hours as needed for Nausea/Vomiting. 30 Tab 6   • aspirin EC (ECOTRIN) 81 MG Tablet Delayed Response Take 1 Tab by mouth every day. 30 Tab 6   • baclofen (LIORESAL) 10 MG Tab Take 1 Tab by mouth 3 times a day. 90 Tab 6   • ivabradine (CORLANOR) 5 MG Tab tablet Take 1 Tab by mouth 2 times a day, with meals. 60 Tab 6   • fluoxetine (PROZAC) 10 MG Cap TAKE ONE CAPSULE BY MOUTH ONCE A DAY 30 Cap 2   • Melatonin 5 MG Tab Take 10 mg by mouth every bedtime.     • fentanyl (DURAGESIC) 25 MCG/HR PATCH 72 HR Apply 1 Patch to skin as directed every 72 hours.     • vitamin D, Ergocalciferol, (DRISDOL) 53878 UNITS Cap capsule Take 50,000 Units by mouth every Friday.     • cyanocobalamin (HM VITAMIN B12) 500 MCG tablet Take 500 mcg by mouth 2 times a day.     • sodium bicarbonate 325 MG Tab Take 2 Tabs by mouth 3 times a day.         MEDICATIONS:    Current facility-administered medications:   •  senna-docusate (PERICOLACE or SENOKOT S) 8.6-50 MG per tablet 2 Tab, 2 Tab, Oral, BID, 2 Tab at 08/04/17 2007 **AND** polyethylene glycol/lytes (MIRALAX) PACKET 1 Packet, 1 Packet, Oral, QDAY PRN **AND** magnesium hydroxide (MILK OF MAGNESIA) suspension 30 mL, 30 mL, Oral, QDAY PRN **AND** bisacodyl (DULCOLAX) suppository 10 mg, 10 mg, Rectal, QDAY PRN, Zacarias Marie M.D.  •  NS infusion, , Intravenous, Continuous, Zacarias Marie M.D., Last Rate: 100 mL/hr at 08/04/17 1531  •  enoxaparin (LOVENOX) inj 40 mg, 40 mg, Subcutaneous, DAILY, Zacarias Marie M.D., 40 mg at  08/04/17 0844  •  acetaminophen (TYLENOL) tablet 650 mg, 650 mg, Oral, Q6HRS PRN, Zacarias Marie M.D.  •  Action is required: Protocol 1073 Hypoglycemia has been implemented, , , Once **AND** Protocol 1073 Inclusion Criteria, , , CONTINUOUS **AND** Protocol 1073 NOTIFY, , , Once **AND** Protocol 1073 Initiate protocol immediately if FSBG is less than or equal to 70 mg/dL, , , CONTINUOUS **AND** glucose 4 g chewable tablet 16 g, 16 g, Oral, Q15 MIN PRN **AND** dextrose 50% (D50W) injection 25 mL, 25 mL, Intravenous, Q15 MIN PRN, Zacarias Marie M.D.  •  thiamine (THIAMINE) tablet 100 mg, 100 mg, Oral, DAILY, Kadeem Alvarenga M.D., 100 mg at 08/04/17 1144  •  levothyroxine (SYNTHROID) tablet 75 mcg, 75 mcg, Oral, AM ES, Bolivar Davis M.D.    FAMILY HISTORY:  Family History   Problem Relation Age of Onset   • Hypertension Mother    • Sleep Apnea Mother    • Heart Disease Mother    • Other Mother      hypothryod   • Hypertension Maternal Uncle    • Heart Disease Maternal Grandmother    • Hypertension Maternal Grandmother    • Other Sister      Narcolepsy;fibromyalsia;bone;nerve   • Genitourinary () Sister      endometriosis       SOCIAL HISTORY:  Social History     Social History   • Marital Status: Single     Spouse Name: N/A   • Number of Children: N/A   • Years of Education: N/A     Occupational History   • Not on file.     Social History Main Topics   • Smoking status: Never Smoker    • Smokeless tobacco: Never Used   • Alcohol Use: No   • Drug Use: No   • Sexual Activity: Not Currently     Birth Control/ Protection: Pill     Other Topics Concern   • Not on file     Social History Narrative    ** Merged History Encounter **            EXAM:  Filed Vitals:    08/04/17 0738 08/04/17 1152 08/04/17 1600 08/04/17 1900   BP: 104/48 102/62 142/54 136/73   Pulse: 73 81 82 81   Temp: 36.2 °C (97.1 °F) 36.6 °C (97.9 °F) 36.4 °C (97.6 °F) 36.1 °C (96.9 °F)   Resp: 17 19 18 17   Height:       Weight:    124.6 kg (274 lb  11.1 oz)   SpO2: 95% 95% 97% 98%     General: pt is lying in bed, NAD  HEENT: normocephalic, atraumatic, normal moist oral mucosa  EYes: anicteric, PERRLA, pupils midline  Neck: supple, no carotid bruits  Chest: CTA b/l no wheezing or rales  Heart: regular rate and rhythm, no murmurs  Abd: soft, non-tender, non distended  Extremtiies: no edema  Skin: no rashes or lesions  Neuro  General: pt is alert, oriented x 3, there is no aphasia, no dyarthria, no gross evidence of neglect, she answers questions appropriately  CN: visual fields are full to confrotation but she reports one finger more than the number of fingers I'm holding up with the right eye only (ie: if I hold up two fingers, she tells me she see's three fingers), EOMI, PERRLA,, facial sensation is intact b/l, there is no facial asymmetry, symmetrical palatal elevation, tongue is midline, sternocleidomastoid and trapezius strength are intact  Motor: normal tone. 5/5 in her upper extremities.  Her right leg is 5/5 throughout. Her l  DTR: were trace in the upper extremities. I could not elicit them in the lower extremities.   Plantar reflexes: downgoing b/l   Coordination: finger to nose and heel shin show no signs of ataxia b/l  Sensation: decreased to pinprick in her legs b/l but with no spinal level and her right arm.      LABORATORY TESTING  Lab Results   Component Value Date/Time    WBC 5.9 08/04/2017 06:49 AM    RBC 4.13* 08/04/2017 06:49 AM    HEMOGLOBIN 12.2 08/04/2017 06:49 AM    HEMATOCRIT 37.0 08/04/2017 06:49 AM    MCV 89.6 08/04/2017 06:49 AM    MCH 29.5 08/04/2017 06:49 AM    MCHC 33.0* 08/04/2017 06:49 AM    MPV 11.6 08/04/2017 06:49 AM    NEUTROPHILS-POLYS 50.80 08/04/2017 06:49 AM    LYMPHOCYTES 37.20 08/04/2017 06:49 AM    MONOCYTES 8.40 08/04/2017 06:49 AM    EOSINOPHILS 2.40 08/04/2017 06:49 AM    BASOPHILS 0.90 08/04/2017 06:49 AM            IMPRESSION AND PLAN: Kristin Balderrama is a  26 y.o. Female with a long history of lower  "extremity weakness that has previously responded to steroids, but now thought to possibly be a conversion disorder.  She was admitted today for a drug overdose and now has left leg weakness and spasms.  She unfortunately cannot get an MRI because of he neurostimulator.  Since she thinks her leg is just \"tired\" I would just monitor her overnight.  Can consider giving her IV steroids if she does not improved.     Bianca Delgado M.D.    Neurology          "
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
## 2017-08-06 ENCOUNTER — PATIENT OUTREACH (OUTPATIENT)
Dept: HEALTH INFORMATION MANAGEMENT | Facility: OTHER | Age: 27
End: 2017-08-06

## 2017-08-06 PROCEDURE — 665999 HH PPS REVENUE DEBIT

## 2017-08-06 PROCEDURE — 665998 HH PPS REVENUE CREDIT

## 2017-08-07 ENCOUNTER — OFFICE VISIT (OUTPATIENT)
Dept: BEHAVIORAL HEALTH | Facility: PHYSICIAN GROUP | Age: 27
End: 2017-08-07
Payer: MEDICARE

## 2017-08-07 VITALS
SYSTOLIC BLOOD PRESSURE: 118 MMHG | RESPIRATION RATE: 16 BRPM | HEIGHT: 62 IN | HEART RATE: 80 BPM | WEIGHT: 262 LBS | DIASTOLIC BLOOD PRESSURE: 72 MMHG | TEMPERATURE: 97.9 F | BODY MASS INDEX: 48.21 KG/M2

## 2017-08-07 DIAGNOSIS — F20.0 PARANOID SCHIZOPHRENIA (HCC): ICD-10-CM

## 2017-08-07 DIAGNOSIS — F60.3 BORDERLINE PERSONALITY DISORDER (HCC): ICD-10-CM

## 2017-08-07 PROCEDURE — 90833 PSYTX W PT W E/M 30 MIN: CPT | Performed by: PSYCHIATRY & NEUROLOGY

## 2017-08-07 PROCEDURE — 665998 HH PPS REVENUE CREDIT

## 2017-08-07 PROCEDURE — 99213 OFFICE O/P EST LOW 20 MIN: CPT | Performed by: PSYCHIATRY & NEUROLOGY

## 2017-08-07 PROCEDURE — 665999 HH PPS REVENUE DEBIT

## 2017-08-07 RX ORDER — FLUOXETINE 10 MG/1
CAPSULE ORAL
Qty: 30 CAP | Refills: 2 | Status: SHIPPED | OUTPATIENT
Start: 2017-08-07 | End: 2017-11-10 | Stop reason: SDUPTHER

## 2017-08-07 RX ORDER — ZIPRASIDONE HYDROCHLORIDE 80 MG/1
80 CAPSULE ORAL 2 TIMES DAILY
Qty: 60 CAP | Refills: 2 | Status: SHIPPED | OUTPATIENT
Start: 2017-08-07 | End: 2017-12-19 | Stop reason: SDUPTHER

## 2017-08-08 ENCOUNTER — HOME CARE VISIT (OUTPATIENT)
Dept: HOME HEALTH SERVICES | Facility: HOME HEALTHCARE | Age: 27
End: 2017-08-08
Payer: MEDICARE

## 2017-08-08 VITALS
TEMPERATURE: 96.9 F | SYSTOLIC BLOOD PRESSURE: 105 MMHG | DIASTOLIC BLOOD PRESSURE: 60 MMHG | RESPIRATION RATE: 18 BRPM | HEART RATE: 72 BPM

## 2017-08-08 PROCEDURE — G0299 HHS/HOSPICE OF RN EA 15 MIN: HCPCS

## 2017-08-08 PROCEDURE — 665998 HH PPS REVENUE CREDIT

## 2017-08-08 PROCEDURE — 665999 HH PPS REVENUE DEBIT

## 2017-08-08 ASSESSMENT — ENCOUNTER SYMPTOMS
VOMITING: DENIES
NAUSEA: DENIES
RESPIRATORY SYMPTOMS COMMENTS: NO CONCERNS AT THIS TIME
DIFFICULTY THINKING: 1
DEPRESSED MOOD: 1
THOUGHT CONTENT - SUSPICIOUS: 1

## 2017-08-08 NOTE — PROGRESS NOTES
"[facility]  PSYCHIATRIC FOLLOW-UP NOTE    Name: Kristin Balderrama  MRN: [id number]  : 1990  Age: 26 y.o.  Date of assessment: 2017  PCP: Torres Brody M.D.  Persons in attendance: Patient  REASON FOR VISIT/CHIEF COMPLAINT (as stated by Patient):  Kristin Balderrama is a 26 y.o., White female, attending follow-up appointment for   Chief Complaint   Patient presents with   • Medication Management     The patient is seen today for follow up after her accidental overdose on medications.   .      HISTORY OF PRESENT ILLNESS:    This is a 27 yo female, came today for follow up using a wheelchair. The patient was discharged from hospital yesterday and she has no medications. The patient reported that she did accidental over dose on medications. The patient was seen by psychiatry at [facility]. The patient denied any suicidal thoughts now.      PSYCHOSOCIAL CHANGES SINCE PREVIOUS CONTACT:  The patient denied any psychosis and denied suicidal thoughts.    RESPONSE TO TREATMENT:  The patient has no medication at home and she requested refills.    MEDICATION SIDE EFFECTS:  Weight gain.    REVIEW OF SYSTEMS:        Constitutional positive - obesity   Eyes negative   Ears/Nose/Mouth/Throat negative   Cardiovascular positive - hypertension   Respiratory positive - obstructive sleep apnea, chronic oxygen therapy   Gastrointestinal negative   Genitourinary positive - chronic UTI, neurogenic bladder, PCOS   Muscular negative   Integumentary positive - chronic inflammatory arthretis   Neurological positive - progressive focal motor weakness   Endocrine positive - hypothyroidism   Hematologic/Lymphatic negative       PSYCHIATRIC EXAMINATION/MENTAL STATUS  /72 mmHg  Pulse 80  Temp(Src) 36.6 °C (97.9 °F)  Resp 16  Ht 1.575 m (5' 2.01\")  Wt 118.842 kg (262 lb)  BMI 47.91 kg/m2  Participation: Active verbal participation and Attentive  Grooming:Casual  Orientation: Alert and Fully " Oriented  Eye contact: Limited  Behavior:Calm   Mood: Anxious  Affect: Anxious  Thought process: Logical and Goal-directed  Thought content:  Within normal limits  Speech: Rate within normal limits and Volume within normal limits  Perception:  Within normal limits  Memory:  Poor memory for chronology of events  Insight: Good  Judgment: Good  Family/couple interaction observations:   Other:    Current risk:    Suicide: Low   Homicide: Low   Self-harm: Moderate  Relapse: Low  Other:   Crisis Safety Plan reviewed?Yes  If evidence of imminent risk is present, intervention/plan:The patient agreed to call crisis line and goes to ER if she gets suicidal thoughts.    Medical Records/Labs/Diagnostic Tests Reviewed: yes.    Medical Records/Labs/Diagnostic Tests Ordered: none.    DIAGNOSTIC IMPRESSION(S):  1. Paranoid schizophrenia (CMS-HCC)    2. Borderline personality disorder           ASSESSMENT AND PLAN:    1. Paranoid schizophrenia.  2. Borderline personality disorder    continue   Ziprasidone 80 mg two at night.    3. Follow up in four weeks.    16 minutes were spent in psychotherapy.  (If greater than 16 minutes, add 60501 code)   Topics addressed in psychotherapy include:  We focused on psychoeducation today.  The patient agreed to monitor her symptoms effectively, identify potential triggers for relapse, and develop skills for halting escalation into depression.  The patient was taught relaxation and breathing exercise to improve her anxiety.  The patient agreed to keep good sleep cycle and daily routine.    Ronny Burnette M.D.

## 2017-08-09 ENCOUNTER — TELEPHONE (OUTPATIENT)
Dept: MEDICAL GROUP | Facility: MEDICAL CENTER | Age: 27
End: 2017-08-09

## 2017-08-09 DIAGNOSIS — R30.0 DYSURIA: ICD-10-CM

## 2017-08-09 PROCEDURE — 665999 HH PPS REVENUE DEBIT

## 2017-08-09 PROCEDURE — 665998 HH PPS REVENUE CREDIT

## 2017-08-09 NOTE — TELEPHONE ENCOUNTER
1. Caller Name: sally                                         Call Back Number: [phone] (home)         Patient approves a detailed voicemail message: yes    patient just came back from [facility] and was told she needs to get UA and Culture. Can you please order. Needs to be faxed to [phone]

## 2017-08-10 ENCOUNTER — HOME CARE VISIT (OUTPATIENT)
Dept: HOME HEALTH SERVICES | Facility: HOME HEALTHCARE | Age: 27
End: 2017-08-10
Payer: MEDICARE

## 2017-08-10 ENCOUNTER — APPOINTMENT (OUTPATIENT)
Dept: PHYSICAL THERAPY | Facility: REHABILITATION | Age: 27
End: 2017-08-10
Attending: INTERNAL MEDICINE
Payer: MEDICARE

## 2017-08-10 PROCEDURE — 665999 HH PPS REVENUE DEBIT

## 2017-08-10 PROCEDURE — 665998 HH PPS REVENUE CREDIT

## 2017-08-10 SDOH — ECONOMIC STABILITY: HOUSING INSECURITY: UNSAFE COOKING RANGE AREA: 0

## 2017-08-10 SDOH — ECONOMIC STABILITY: HOUSING INSECURITY: HOME SAFETY: PATIENT LIVES IN A SINGLE LEVEL HOME WITH HER GRANDPARENTS. HOME HAS RAMP BUILT OUTSIDE.

## 2017-08-10 SDOH — ECONOMIC STABILITY: HOUSING INSECURITY: UNSAFE APPLIANCES: 0

## 2017-08-10 ASSESSMENT — ENCOUNTER SYMPTOMS
DIFFICULTY THINKING: 1
POOR JUDGMENT: 1
AGORAPHOBIA: 1
DEPRESSED MOOD: 1
THOUGHT CONTENT - SUSPICIOUS: 1

## 2017-08-10 ASSESSMENT — ACTIVITIES OF DAILY LIVING (ADL)
OASIS_M1830: 02
HOME_HEALTH_OASIS: 01

## 2017-08-11 ENCOUNTER — APPOINTMENT (OUTPATIENT)
Dept: PHYSICAL THERAPY | Facility: REHABILITATION | Age: 27
End: 2017-08-11
Attending: INTERNAL MEDICINE
Payer: MEDICARE

## 2017-08-11 ENCOUNTER — APPOINTMENT (OUTPATIENT)
Dept: NEUROLOGY | Facility: MEDICAL CENTER | Age: 27
End: 2017-08-11
Payer: MEDICARE

## 2017-08-11 PROCEDURE — 665998 HH PPS REVENUE CREDIT

## 2017-08-11 PROCEDURE — 665999 HH PPS REVENUE DEBIT

## 2017-08-12 PROCEDURE — 665998 HH PPS REVENUE CREDIT

## 2017-08-12 PROCEDURE — 665999 HH PPS REVENUE DEBIT

## 2017-08-13 PROCEDURE — 665998 HH PPS REVENUE CREDIT

## 2017-08-13 PROCEDURE — 665999 HH PPS REVENUE DEBIT

## 2017-08-14 ENCOUNTER — APPOINTMENT (OUTPATIENT)
Dept: PHYSICAL THERAPY | Facility: REHABILITATION | Age: 27
End: 2017-08-14
Attending: INTERNAL MEDICINE
Payer: MEDICARE

## 2017-08-14 ENCOUNTER — TELEPHONE (OUTPATIENT)
Dept: CARDIOLOGY | Facility: MEDICAL CENTER | Age: 27
End: 2017-08-14

## 2017-08-14 ENCOUNTER — OFFICE VISIT (OUTPATIENT)
Dept: MEDICAL GROUP | Facility: MEDICAL CENTER | Age: 27
End: 2017-08-14
Payer: MEDICARE

## 2017-08-14 VITALS
OXYGEN SATURATION: 97 % | HEIGHT: 62 IN | HEART RATE: 71 BPM | TEMPERATURE: 97.7 F | RESPIRATION RATE: 16 BRPM | DIASTOLIC BLOOD PRESSURE: 88 MMHG | SYSTOLIC BLOOD PRESSURE: 122 MMHG | WEIGHT: 263.2 LBS | BODY MASS INDEX: 48.44 KG/M2

## 2017-08-14 DIAGNOSIS — B37.9 YEAST INFECTION: ICD-10-CM

## 2017-08-14 DIAGNOSIS — H60.501 ACUTE OTITIS EXTERNA OF RIGHT EAR, UNSPECIFIED TYPE: ICD-10-CM

## 2017-08-14 PROCEDURE — 665998 HH PPS REVENUE CREDIT

## 2017-08-14 PROCEDURE — 99213 OFFICE O/P EST LOW 20 MIN: CPT | Performed by: INTERNAL MEDICINE

## 2017-08-14 PROCEDURE — 665999 HH PPS REVENUE DEBIT

## 2017-08-14 RX ORDER — NYSTATIN 100000 U/G
1 CREAM TOPICAL 2 TIMES DAILY
Qty: 1 TUBE | Refills: 5 | Status: SHIPPED | OUTPATIENT
Start: 2017-08-14 | End: 2017-08-29

## 2017-08-14 RX ORDER — NEOMYCIN SULFATE, POLYMYXIN B SULFATE AND HYDROCORTISONE 10; 3.5; 1 MG/ML; MG/ML; [USP'U]/ML
5 SUSPENSION/ DROPS AURICULAR (OTIC) 3 TIMES DAILY
Qty: 1 BOTTLE | Refills: 0 | Status: SHIPPED | OUTPATIENT
Start: 2017-08-14 | End: 2017-08-29

## 2017-08-14 NOTE — TELEPHONE ENCOUNTER
Pt states for the last month she has been tracking her HR with her pulse ox, she dips to 45, it makes her feel drained, cold, sweaty, clammy, weak and tired. Happens approx daily, please advise, to JE

## 2017-08-14 NOTE — TELEPHONE ENCOUNTER
----- Message from Sonal Lynch sent at 8/14/2017  3:39 PM PDT -----  Regarding: patient's reporting heart rate of 40  FUENTES/Catalina      Patient says her heart rate dropped down to 40 today. She can be reached at [phone].

## 2017-08-14 NOTE — MR AVS SNAPSHOT
"        Kristin Balderrama   2017 1:40 PM   Office Visit   MRN: [id number]    Department:  [address] [place] [address]   Dept Phone:  [phone]    Description:  Female : 1990   Provider:  Torres Brody M.D.           Allergies as of 2017     Allergen Noted Reactions    Cefdinir 2016   Shortness of Breath, Itching    Tolerated 17    Depakote [Divalproex Sodium] 2010   Unspecified    Muscle spasms/muscle pain and weakness      Abilify 2013   Unspecified    Headaches/muscle twitching      Amitriptyline 10/31/2013   Unspecified    Headaches      Amoxicillin 2010   Rash    Pt states \"I get a rash\".      Ciprofloxacin 2009   Rash    Pt states \"I get a rash\".      Clindamycin 2011   Nausea    Even with food      Ees [Erythromycin] 2010   Vomiting, Nausea    Flagyl [Metronidazole Hcl] 2011   Unspecified    \"eye problems\"      Flomax [Tamsulosin Hydrochloride] 2009   Swelling    Metformin 2013   Unspecified    Increased lactic acid       Sulfa Drugs 2010   Hives, Rash    RXN=since childhood    Tape 08/15/2012   Rash    Tears skin off   coban with Tegaderm tape ok  RXN=ongoing    Vancomycin 07/10/2016   Itching    Pt becomes flushed in face and chest.   RXN=7/10/16    Cephalexin [Keflex] 2017   Rash    Pt states she gets a rash with this medication    Erythromycin 2017   Rash    .    Levofloxacin 10/27/2016   Unspecified    Leg muscle cramps    Metronidazole 2017   Rash    .    Valproic Acid 2017   Rash    .      You were diagnosed with     Yeast infection   [986492]       Acute otitis externa of right ear, unspecified type   [7917552]         Vital Signs     Blood Pressure Pulse Temperature Respirations Height Weight    122/88 mmHg 71 36.5 °C (97.7 °F) 16 1.575 m (5' 2.01\") 119.387 kg (263 lb 3.2 oz)    Body Mass Index Oxygen Saturation Smoking Status             48.13 kg/m2 97% Never Smoker          Basic " Information     Date Of Birth Sex Race Ethnicity Preferred Language    1990 Female White Non- English      Your appointments     Aug 14, 2017  1:40 PM   Established Patient with Torres Brody M.D.   [facility] [address]    [address]  [address]  [place] [address] [phone]           You will be receiving a confirmation call a few days before your appointment from our automated call confirmation system.            Aug 16, 2017  8:30 AM   MA DX30 with S ALEJANDRAARRAN MG 1   [facility] IMAGING [facility] ([place])    [address]  [place] [address]   [phone]            Aug 24, 2017 11:30 AM   Follow Up Med Management with Ronny Burnette M.D.   BEHAVIORAL HEALTH [address] [place] [address])    [address] [place] [address]  [address]  [place] [address]   [phone]            Aug 29, 2017  8:00 AM   Individual Therapy with Blanca Brantley L.C.S.W.   [facility] ([place])    [address]  [place] [address]   [phone]            Sep 18, 2017  8:00 AM   Individual Therapy with Blanca Brantley L.C.S.W.   BEHAVIORAL [facility] ([place])    [address]  [address]  [place] [address] [phone]            Oct 04, 2017 10:00 AM   Individual Therapy with Blanca Brantley L.C.S.W.   BEHAVIORAL [facility]    [address]  [address]  [place] [address] [phone]            Oct 06, 2017  7:20 AM   Established Patient with Torres Brody M.D.   [facility] [address]    [address]  [address]  [place] [address] [phone]           You will be receiving a confirmation call a few days before your appointment from our automated call confirmation system.              Problem List              ICD-10-CM Priority Class Noted - Resolved    Chronic UTI (Chronic) N39.0 Low  9/18/2010 - Present    Borderline personality disorder in adult F60.3 Low  9/18/2010 - Present    Neurogenic bladder  N31.9 Low  4/2/2011 - Present    Sinus tachycardia (Chronic) R00.0 High  10/31/2013 - Present    Knee pain, right M25.561 Low  2/13/2014 - Present    Anxiety F41.9 Low  12/16/2014 - Present    Fatty liver disease, nonalcoholic K76.0 Low  1/19/2015 - Present    Progressive focal motor weakness R53.1 Low  6/28/2015 - Present    Acquired hypothyroidism E03.9 Low  11/23/2015 - Present    PCOS (polycystic ovarian syndrome) E28.2 Low  11/23/2015 - Present    H/O prior ablation treatment Z98.890   2/10/2016 - Present    Peripheral neuropathy (CMS-HCC) (Chronic) G62.9   3/6/2016 - Present    TONYA on CPAP G47.33, Z99.89 Low  3/7/2016 - Present    Morbidly obese (CMS-HCC) E66.01   3/7/2016 - Present    Scoliosis M41.9   3/7/2016 - Present    GERD (gastroesophageal reflux disease) (Chronic) K21.9   3/7/2016 - Present    Vitamin D deficiency E55.9   5/21/2016 - Present    Chronic inflammatory arthritis (Chronic) M19.90 Medium  5/23/2016 - Present    Weakness of both lower extremities R29.898   6/22/2016 - Present    Elevated sedimentation rate R70.0   6/27/2016 - Present    Galactorrhea O92.6   7/22/2016 - Present    Psychosis, schizophrenia, simple (HCC) (Chronic) F20.89 Low Chronic 9/29/2016 - Present    Schizophrenia (CMS-HCC) F20.9 Low  10/27/2016 - Present    Chronic pain syndrome (Chronic) G89.4   10/27/2016 - Present    Bowel and bladder incontinence R32, R15.9   10/27/2016 - Present    Depression F32.9 Low  10/28/2016 - Present    HTN (hypertension) (Chronic) I10   11/1/2016 - Present    Hypovitaminosis D E55.9   11/29/2016 - Present    Leg weakness R29.898   1/4/2017 - Present    Weakness R53.1   1/22/2017 - Present    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 High  1/24/2017 - Present    Chronic suprapubic catheter (CMS-HCC) (Chronic) Z93.59   2/16/2017 - Present    Leg weakness, bilateral R29.898   2/22/2017 - Present    Weakness of right upper extremity R29.898   2/23/2017 - Present    Chest pain R07.9    3/30/2017 - Present    On home oxygen therapy (Chronic) Z99.81   4/15/2017 - Present    Dysuria R30.0   5/9/2017 - Present    UTI (urinary tract infection) N39.0   5/13/2017 - Present    Weakness R53.1   5/13/2017 - Present    Enterococcus faecalis infection B95.2   5/13/2017 - Present    Fall at home W19.XXXA, Y92.099   5/13/2017 - Present    Hashimoto's encephalopathy E06.3, G93.49   5/17/2017 - Present    Rash R21   6/9/2017 - Present    IGT (impaired glucose tolerance) R73.02   7/6/2017 - Present    Accidental overdose T50.901A   8/4/2017 - Present    Hypothyroidism (Chronic) E03.9   8/4/2017 - Present    Right leg weakness R29.898 Medium  8/4/2017 - Present      Health Maintenance        Date Due Completion Dates    IMM VARICELLA (CHICKENPOX) VACCINE (1 of 2 - 2 Dose Adolescent Series) 10/13/2002 ---    IMM INFLUENZA (1) 9/1/2017 10/5/2016, 9/5/2013, 12/7/2011, 11/7/2011    A1C SCREENING 1/6/2018 7/6/2017, 6/28/2017, 4/6/2017, 12/7/2016, 10/14/2016, 3/9/2016, 9/5/2014    IMM HEP A VACCINE (2 of 2 - Standard Series) 1/18/2018 7/18/2017    URINE ACR / MICROALBUMIN 5/5/2018 5/5/2017, 3/7/2017, 12/7/2016, 10/14/2016, 7/28/2016    RETINAL SCREENING 5/23/2018 5/23/2017    DIABETES MONOFILAMENT / LE EXAM 5/23/2018 5/23/2017    FASTING LIPID PROFILE 8/4/2018 8/4/2017, 7/6/2017, 3/7/2017, 2/24/2017, 12/7/2016, 10/28/2016, 10/14/2016, 3/21/2014    SERUM CREATININE 8/4/2018 8/4/2017, 8/4/2017, 7/24/2017, 7/19/2017, 7/6/2017, 6/21/2017, 5/18/2017, 5/17/2017, 5/14/2017, 5/13/2017, 5/5/2017, 4/14/2017, 4/13/2017, 4/12/2017, 4/5/2017, 3/27/2017, 3/18/2017, 3/17/2017, 3/16/2017, 3/11/2017, 3/10/2017, 3/9/2017, 3/7/2017, 2/25/2017, 2/24/2017, 2/23/2017, 2/22/2017, 1/30/2017, 1/27/2017, 1/25/2017, 1/22/2017, 1/22/2017, 1/18/2017, 1/18/2017, 12/7/2016, 12/6/2016, 11/4/2016, 11/3/2016, 11/2/2016, 11/1/2016, 10/28/2016, 10/27/2016, 10/14/2016, 10/4/2016, 10/3/2016, 9/29/2016, 8/16/2016, 7/28/2016, 7/28/2016, 7/10/2016, 6/25/2016,  "6/23/2016, 6/22/2016, 6/7/2016, 5/18/2016, 4/19/2016, 4/3/2016, 3/30/2016, 3/29/2016, 3/28/2016, 3/14/2016, 3/10/2016, 3/9/2016, 3/7/2016, 3/6/2016, 2/15/2016, 2/12/2016, 1/29/2016, 1/28/2016, 1/26/2016, 11/28/2015, 11/27/2015, 11/23/2015, 11/22/2015, 7/9/2015, 7/8/2015, 6/27/2015, 4/14/2015, 3/23/2015, 2/18/2015, 10/27/2014, 9/5/2014, 3/21/2014, 12/24/2013, 1/12/2013, 8/24/2012, 8/23/2012, 8/22/2012, 8/15/2012, 4/21/2012, 4/20/2012, 4/19/2012, 9/17/2011, 4/3/2011, 4/2/2011, 3/31/2011, 9/20/2010, 9/19/2010, 9/18/2010, 8/28/2010, 7/20/2010, 1/30/2007    PAP SMEAR 7/22/2019 7/22/2016 (Postponed), 2/19/2013 (N/S)    Override on 7/22/2016: Postponed (per pt was told could \"skip\" 2016)    Override on 2/19/2013: (N/S) ([facility])    COLONOSCOPY 9/25/2023 9/25/2013    IMM DTaP/Tdap/Td Vaccine (8 - Td) 7/23/2027 7/23/2017, 8/6/2012, 9/18/2010, 3/3/1994, 5/17/1991, 5/10/1990, 3/23/1990, 1990            Current Immunizations     Dtap Vaccine 3/3/1994, 5/17/1991, 5/10/1990, 3/23/1990, 1990    HIB Vaccine(PEDVAX) 1/11/1991    HPV Quadrivalent Vaccine (GARDASIL) 10/27/2011, 5/27/2011, 3/1/2011    Hepatitis A Vaccine, Adult 7/18/2017  2:15 PM    Hepatitis B Vaccine Non-Recombivax (Ped/Adol) 1/28/2004, 10/28/2003, 10/29/1999    Influenza TIV (IM) 9/5/2013, 12/7/2011, 11/7/2011    Influenza Vaccine Adult HD 10/5/2016    MMR Vaccine 3/3/1994, 1/11/1991    OPV - Historical Data 3/3/1994, 5/17/1991, 5/10/1990, 3/23/1990, 1990    Pneumococcal Vaccine (PCV7) Historical Data 11/8/2015    Pneumococcal polysaccharide vaccine (PPSV-23) 1/23/2017  5:35 PM, 1/14/2017    TD Vaccine 9/18/2010  4:45 PM    Tdap Vaccine 7/23/2017, 8/6/2012      Below and/or attached are the medications your provider expects you to take. Review all of your home medications and newly ordered medications with your provider and/or pharmacist. Follow medication instructions as directed by your provider and/or pharmacist. Please keep your medication list " with you and share with your provider. Update the information when medications are discontinued, doses are changed, or new medications (including over-the-counter products) are added; and carry medication information at all times in the event of emergency situations     Allergies:  CEFDINIR - Shortness of Breath,Itching     DEPAKOTE - Unspecified     ABILIFY - Unspecified     AMITRIPTYLINE - Unspecified     AMOXICILLIN - Rash     CIPROFLOXACIN - Rash     CLINDAMYCIN - Nausea     EES - Vomiting,Nausea     FLAGYL - Unspecified     FLOMAX - Swelling     METFORMIN - Unspecified     SULFA DRUGS - Hives,Rash     TAPE - Rash     VANCOMYCIN - Itching     CEPHALEXIN - Rash     ERYTHROMYCIN - Rash     LEVOFLOXACIN - Unspecified     METRONIDAZOLE - Rash     VALPROIC ACID - Rash               Medications  Valid as of: August 14, 2017 -  1:38 PM    Generic Name Brand Name Tablet Size Instructions for use    Aspirin (Tablet Delayed Response) ECOTRIN 81 MG Take 1 Tab by mouth every day.        Baclofen (Tab) LIORESAL 10 MG Take 1 Tab by mouth 3 times a day.        Cranberry (Tab) Cranberry 400 MG Take 2 Tabs by mouth every day.        Cyanocobalamin (Tab) vitamin b12 500 MCG Take 500 mcg by mouth 2 times a day.        Doxycycline Hyclate (Tab) VIBRAMYCIN 100 MG Take 100 mg by mouth 2 times a day. Pt started on 7/23/2017 for 10 day course for cellulitis of breast        Ergocalciferol (Cap) DRISDOL 86689 UNITS Take 50,000 Units by mouth every Friday.        FentaNYL (PATCH 72 HR) DURAGESIC 25 MCG/HR Apply 1 Patch to skin as directed every 72 hours.        FLUoxetine HCl (Cap) PROZAC 10 MG TAKE ONE CAPSULE BY MOUTH ONCE A DAY        Fluticasone Propionate (Suspension) FLONASE 50 MCG/ACT Spray 2 Sprays in nose every day.        Furosemide (Tab) LASIX 40 MG Take 40 mg by mouth every day.        Gabapentin (Once-Daily) (Tab) GRALISE 600 MG Take 600 mg by mouth 3 times a day.        Ivabradine HCl (Tab) CORLANOR 5 MG Take 1 Tab by mouth  2 times a day, with meals.        Lactobacillus (Pack) LACTINEX/FLORANEX  Take 1 Packet by mouth 3 times a day, with meals.        Lactulose (Solution) lactulose 10 GM/15ML Take 10 g by mouth 2 times a day as needed (For constipation).        Levothyroxine Sodium (Tab) SYNTHROID 75 MCG Take 75 mcg by mouth Every morning on an empty stomach.        Loratadine (Tab) CLARITIN 10 MG Take 1 Tab by mouth every day.        Melatonin (Tab) Melatonin 5 MG Take 10 mg by mouth every bedtime.        Mirabegron (TABLET SR 24 HR) Mirabegron ER 25 MG Take 1 Tab by mouth every day.        Mupirocin (Ointment) BACTROBAN 2 % Apply 1 Application to affected area(s) every day. Pt started on 7/23/2017 for cellulitis of breast        Neomycin-Polymyxin-HC (Suspension) PEDIOTIC HC 3.5-36905-7 Place 5 Drops in ear 3 times a day.        Nitroglycerin (SL Tab) NITROSTAT 0.3 MG PLACE 1 TABLET UNDER TONGUE IF NEEDED FOR CHEST PAIN EVERY 5 MINUTES FOR 3 DOSES AS DIRECTED        Nystatin (Cream) MYCOSTATIN 724851 UNIT/GM Apply 1 g to affected area(s) 2 times a day.        Oxycodone-Acetaminophen (Tab) PERCOCET-10  MG Take 2 Tabs by mouth 2 Times a Day. Indications: Pain        Promethazine HCl (Tab) PHENERGAN 25 MG Take 1 Tab by mouth every 6 hours as needed for Nausea/Vomiting.        SITagliptin Phosphate (Tab) JANUVIA 100 MG Take 1 Tab by mouth every day.        Sodium Bicarbonate (Tab) sodium bicarbonate 325 MG Take 2 Tabs by mouth 3 times a day.        Tobramycin (Solution) TOBREX 0.3 % Place 2 Drops in both eyes every 4 hours. Pt started on 7/4/2017 for 7 day course for ear infection.        Ziprasidone HCl (Cap) GEODON 80 MG TAKE 2 CAPSULES BY MOUTH NIGHTLY AT BEDTIME        Ziprasidone HCl (Cap) GEODON 40 MG One cap by mouth at noon after meal.        Ziprasidone HCl (Cap) GEODON 80 MG Take 1 Cap by mouth 2 Times a Day.        .                 Medicines prescribed today were sent to:     [id number] - [place], [place] - [address]  [facility]    [address] [facility] [place] [address]    Phone: [phone] Fax: [phone]    Open 24 Hours?: No      Medication refill instructions:       If your prescription bottle indicates you have medication refills left, it is not necessary to call your provider’s office. Please contact your pharmacy and they will refill your medication.    If your prescription bottle indicates you do not have any refills left, you may request refills at any time through one of the following ways: The online [employer] system (except Urgent Care), by calling your provider’s office, or by asking your pharmacy to contact your provider’s office with a refill request. Medication refills are processed only during regular business hours and may not be available until the next business day. Your provider may request additional information or to have a follow-up visit with you prior to refilling your medication.   *Please Note: Medication refills are assigned a new Rx number when refilled electronically. Your pharmacy may indicate that no refills were authorized even though a new prescription for the same medication is available at the pharmacy. Please request the medicine by name with the pharmacy before contacting your provider for a refill.        Other Notes About Your Plan     DME:  [facility] / ph [phone] / fax [phone]              [employer] Access Code: Activation code not generated  Current [employer] Status: Active

## 2017-08-14 NOTE — PROGRESS NOTES
CC: Buttock wound and ear discomfort.    HPI:   Kristin presents today with the following.    1. Buttock wound  Presents with complaints of discomfort in her gluteal cleft. It's appear to her rectum. There is no major drainage is slightly painful with sitting. She denies any fevers or chills.    2. Acute otitis externa of right ear, unspecified type  She is complaining of right ear pain and noticed some blood on her pillow. More in the canal in terms of a deep pain. No fevers chills.      Patient Active Problem List    Diagnosis Date Noted   • Paraparesis of both lower limbs (CMS-HCC) 01/24/2017     Priority: High   • Sinus tachycardia 10/31/2013     Priority: High   • Right leg weakness 08/04/2017     Priority: Medium   • Chronic inflammatory arthritis 05/23/2016     Priority: Medium   • Depression 10/28/2016     Priority: Low   • Schizophrenia (CMS-HCC) 10/27/2016     Priority: Low   • Psychosis, schizophrenia, simple ([facility]) 09/29/2016     Priority: Low     Class: Chronic   • TONYA on CPAP 03/07/2016     Priority: Low   • Acquired hypothyroidism 11/23/2015     Priority: Low   • PCOS (polycystic ovarian syndrome) 11/23/2015     Priority: Low   • Progressive focal motor weakness 06/28/2015     Priority: Low   • Fatty liver disease, nonalcoholic 01/19/2015     Priority: Low   • Anxiety 12/16/2014     Priority: Low   • Knee pain, right 02/13/2014     Priority: Low   • Neurogenic bladder 04/02/2011     Priority: Low   • Chronic UTI 09/18/2010     Priority: Low   • Borderline personality disorder in adult 09/18/2010     Priority: Low   • Accidental overdose 08/04/2017   • Hypothyroidism 08/04/2017   • IGT (impaired glucose tolerance) 07/06/2017   • Rash 06/09/2017   • Hashimoto's encephalopathy 05/17/2017   • UTI (urinary tract infection) 05/13/2017   • Weakness 05/13/2017   • Enterococcus faecalis infection 05/13/2017   • Fall at home 05/13/2017   • Dysuria 05/09/2017   • On home oxygen therapy 04/15/2017   • Chest pain  03/30/2017   • Weakness of right upper extremity 02/23/2017   • Leg weakness, bilateral 02/22/2017   • Chronic suprapubic catheter (CMS-HCC) 02/16/2017   • Weakness 01/22/2017   • Leg weakness 01/04/2017   • Hypovitaminosis D 11/29/2016   • HTN (hypertension) 11/01/2016   • Chronic pain syndrome 10/27/2016   • Bowel and bladder incontinence 10/27/2016   • Galactorrhea 07/22/2016   • Elevated sedimentation rate 06/27/2016   • Weakness of both lower extremities 06/22/2016   • Vitamin D deficiency 05/21/2016   • Morbidly obese (CMS-HCC) 03/07/2016   • Scoliosis 03/07/2016   • GERD (gastroesophageal reflux disease) 03/07/2016   • Peripheral neuropathy (CMS-HCC) 03/06/2016   • H/O prior ablation treatment 02/10/2016       Current Outpatient Prescriptions   Medication Sig Dispense Refill   • nystatin (MYCOSTATIN) 043112 UNIT/GM Cream topical cream Apply 1 g to affected area(s) 2 times a day. 1 Tube 5   • neomycin-polymyxin-HC (PEDIOTIC HC) 3.5-83799-5 Suspension Place 5 Drops in ear 3 times a day. 1 Bottle 0   • fluoxetine (PROZAC) 10 MG Cap TAKE ONE CAPSULE BY MOUTH ONCE A DAY 30 Cap 2   • ziprasidone (GEODON) 80 MG Cap Take 1 Cap by mouth 2 Times a Day. 60 Cap 2   • fluticasone (FLONASE) 50 MCG/ACT nasal spray Spray 2 Sprays in nose every day. 16 g 11   • loratadine (CLARITIN) 10 MG Tab Take 1 Tab by mouth every day. 30 Tab    • ziprasidone (GEODON) 40 MG Cap One cap by mouth at noon after meal. 30 Cap 2   • oxycodone-acetaminophen (PERCOCET-10)  MG Tab Take 2 Tabs by mouth 2 Times a Day. Indications: Pain     • doxycycline (VIBRAMYCIN) 100 MG Tab Take 100 mg by mouth 2 times a day. Pt started on 7/23/2017 for 10 day course for cellulitis of breast     • mupirocin (BACTROBAN) 2 % Ointment Apply 1 Application to affected area(s) every day. Pt started on 7/23/2017 for cellulitis of breast     • tobramycin (TOBREX) 0.3 % Solution ophthalmic solution Place 2 Drops in both eyes every 4 hours. Pt started on 7/4/2017 for  7 day course for ear infection.     • sitagliptin (JANUVIA) 100 MG Tab Take 1 Tab by mouth every day. 30 Tab 6   • ziprasidone (GEODON) 80 MG Cap TAKE 2 CAPSULES BY MOUTH NIGHTLY AT BEDTIME 60 Cap 5   • GRALISE 600 MG Tab Take 600 mg by mouth 3 times a day.     • nitroGLYCERIN (NITROSTAT) 0.3 MG SL tablet PLACE 1 TABLET UNDER TONGUE IF NEEDED FOR CHEST PAIN EVERY 5 MINUTES FOR 3 DOSES AS DIRECTED  0   • Mirabegron ER (MYRBETRIQ) 25 MG TABLET SR 24 HR Take 1 Tab by mouth every day.     • lactobacillus granules (LACTINEX/FLORANEX) Pack Take 1 Packet by mouth 3 times a day, with meals.     • levothyroxine (SYNTHROID) 75 MCG Tab Take 75 mcg by mouth Every morning on an empty stomach.     • lactulose 10 GM/15ML Solution Take 10 g by mouth 2 times a day as needed (For constipation).     • furosemide (LASIX) 40 MG Tab Take 40 mg by mouth every day.     • Cranberry 400 MG Tab Take 2 Tabs by mouth every day.     • promethazine (PHENERGAN) 25 MG Tab Take 1 Tab by mouth every 6 hours as needed for Nausea/Vomiting. 30 Tab 6   • aspirin EC (ECOTRIN) 81 MG Tablet Delayed Response Take 1 Tab by mouth every day. 30 Tab 6   • baclofen (LIORESAL) 10 MG Tab Take 1 Tab by mouth 3 times a day. 90 Tab 6   • ivabradine (CORLANOR) 5 MG Tab tablet Take 1 Tab by mouth 2 times a day, with meals. 60 Tab 6   • Melatonin 5 MG Tab Take 10 mg by mouth every bedtime.     • fentanyl (DURAGESIC) 25 MCG/HR PATCH 72 HR Apply 1 Patch to skin as directed every 72 hours.     • vitamin D, Ergocalciferol, (DRISDOL) 29426 UNITS Cap capsule Take 50,000 Units by mouth every Friday.     • cyanocobalamin (HM VITAMIN B12) 500 MCG tablet Take 500 mcg by mouth 2 times a day.     • sodium bicarbonate 325 MG Tab Take 2 Tabs by mouth 3 times a day.       No current facility-administered medications for this visit.         Allergies as of 08/14/2017 - Joe as Reviewed 08/14/2017   Allergen Reaction Noted   • Cefdinir Shortness of Breath and Itching 03/01/2016   •  "Depakote [divalproex sodium] Unspecified 06/14/2010   • Abilify Unspecified 01/17/2013   • Amitriptyline Unspecified 10/31/2013   • Amoxicillin Rash 09/18/2010   • Ciprofloxacin Rash 12/17/2009   • Clindamycin Nausea 02/02/2011   • Ees [erythromycin] Vomiting and Nausea 08/28/2010   • Flagyl [metronidazole hcl] Unspecified 03/31/2011   • Flomax [tamsulosin hydrochloride] Swelling 09/24/2009   • Metformin Unspecified 07/23/2013   • Sulfa drugs Hives and Rash 09/18/2010   • Tape Rash 08/15/2012   • Vancomycin Itching 07/10/2016   • Cephalexin [keflex] Rash 01/01/2017   • Erythromycin Rash 03/30/2017   • Levofloxacin Unspecified 10/27/2016   • Metronidazole Rash 03/30/2017   • Valproic acid Rash 03/30/2017        ROS: As per HPI.    /88 mmHg  Pulse 71  Temp(Src) 36.5 °C (97.7 °F)  Resp 16  Ht 1.575 m (5' 2.01\")  Wt 119.387 kg (263 lb 3.2 oz)  BMI 48.13 kg/m2  SpO2 97%    Physical Exam:  Gen:         Alert and oriented, No apparent distress.  Heent       erythema of the external canal TMs looks normal.  Neck:        No Lymphadenopathy or Bruits.  Lungs:     Clear to auscultation bilaterally  CV:          Regular rate and rhythm. No murmurs, rubs or gallops.               Ext:          No clubbing, cyanosis, edema.      Assessment and Plan.   26 y.o. female with the following issues.    1. Yeast infection  Brian looks consistent with possible yeast infection apply sunscreen nystatin creams follow-up if not improving.  - nystatin (MYCOSTATIN) 009201 UNIT/GM Cream topical cream; Apply 1 g to affected area(s) 2 times a day.  Dispense: 1 Tube; Refill: 5    2. Acute otitis externa of right ear, unspecified type  Consistent with possible otitis externa have placed on drops if not improving follow-up.  - neomycin-polymyxin-HC (PEDIOTIC HC) 3.5-31334-0 Suspension; Place 5 Drops in ear 3 times a day.  Dispense: 1 Bottle; Refill: 0        "

## 2017-08-15 PROCEDURE — 665999 HH PPS REVENUE DEBIT

## 2017-08-15 PROCEDURE — 665998 HH PPS REVENUE CREDIT

## 2017-08-16 ENCOUNTER — HOSPITAL ENCOUNTER (OUTPATIENT)
Dept: RADIOLOGY | Facility: MEDICAL CENTER | Age: 27
End: 2017-08-16
Attending: NURSE PRACTITIONER
Payer: MEDICARE

## 2017-08-16 DIAGNOSIS — N64.4 BREAST PAIN: ICD-10-CM

## 2017-08-16 DIAGNOSIS — N63.0 BREAST LUMP: ICD-10-CM

## 2017-08-16 PROCEDURE — 665998 HH PPS REVENUE CREDIT

## 2017-08-16 PROCEDURE — 76642 ULTRASOUND BREAST LIMITED: CPT | Mod: LT

## 2017-08-16 PROCEDURE — 665999 HH PPS REVENUE DEBIT

## 2017-08-17 ENCOUNTER — TELEPHONE (OUTPATIENT)
Dept: MEDICAL GROUP | Facility: MEDICAL CENTER | Age: 27
End: 2017-08-17

## 2017-08-17 ENCOUNTER — APPOINTMENT (OUTPATIENT)
Dept: PHYSICAL THERAPY | Facility: REHABILITATION | Age: 27
End: 2017-08-17
Attending: INTERNAL MEDICINE
Payer: MEDICARE

## 2017-08-17 DIAGNOSIS — M25.561 CHRONIC PAIN OF RIGHT KNEE: ICD-10-CM

## 2017-08-17 DIAGNOSIS — M19.90 CHRONIC INFLAMMATORY ARTHRITIS: Chronic | ICD-10-CM

## 2017-08-17 DIAGNOSIS — G89.29 CHRONIC PAIN OF RIGHT KNEE: ICD-10-CM

## 2017-08-17 DIAGNOSIS — R29.898 WEAKNESS OF BOTH LOWER EXTREMITIES: ICD-10-CM

## 2017-08-17 PROCEDURE — 665999 HH PPS REVENUE DEBIT

## 2017-08-17 PROCEDURE — 665998 HH PPS REVENUE CREDIT

## 2017-08-17 NOTE — TELEPHONE ENCOUNTER
1. Caller Name: Kristin Balderrama                                           Call Back Number: [phone] (home)         Patient approves a detailed voicemail message: N\A    2. SPECIFIC Action To Be Taken: Up Dated Referral to Physical Therapy, Patient wanted to now if you would place an up dated referral for a different physical therapist but not through [facility]? Please advise    3. Diagnosis/Clinical Reason for Request: Weakness of both lower extremities    4. Specialty & Provider Name/Lab/Imaging Location: N/A    5. Is appointment scheduled for requested order/referral: NO

## 2017-08-17 NOTE — TELEPHONE ENCOUNTER
[facility],  Physical Therapy at [address] [place] [address], [phone] ? Please advise

## 2017-08-18 PROCEDURE — 665998 HH PPS REVENUE CREDIT

## 2017-08-18 PROCEDURE — 665999 HH PPS REVENUE DEBIT

## 2017-08-19 PROCEDURE — 665998 HH PPS REVENUE CREDIT

## 2017-08-19 PROCEDURE — 665999 HH PPS REVENUE DEBIT

## 2017-08-20 PROCEDURE — 665998 HH PPS REVENUE CREDIT

## 2017-08-20 PROCEDURE — 665999 HH PPS REVENUE DEBIT

## 2017-08-21 PROCEDURE — 665999 HH PPS REVENUE DEBIT

## 2017-08-21 PROCEDURE — 665998 HH PPS REVENUE CREDIT

## 2017-08-22 ENCOUNTER — APPOINTMENT (OUTPATIENT)
Dept: PHYSICAL THERAPY | Facility: REHABILITATION | Age: 27
End: 2017-08-22
Attending: INTERNAL MEDICINE
Payer: MEDICARE

## 2017-08-22 ENCOUNTER — TELEPHONE (OUTPATIENT)
Dept: MEDICAL GROUP | Facility: MEDICAL CENTER | Age: 27
End: 2017-08-22

## 2017-08-22 PROCEDURE — 665998 HH PPS REVENUE CREDIT

## 2017-08-22 PROCEDURE — 665999 HH PPS REVENUE DEBIT

## 2017-08-22 NOTE — TELEPHONE ENCOUNTER
Patient spoke with Lashawn, , and Lashawn spoke with [facility] and they received the referral and will contact the patient to schedule an appointment. Lashawn spoke with patient and let her know the referral was received and they will contact her to schedule.

## 2017-08-22 NOTE — TELEPHONE ENCOUNTER
1. Caller Name: Kristin Balderrama                                           Call Back Number: [phone] (home)         Patient approves a detailed voicemail message: N\A    Patient wanted recent referral to [facility] faxed to [phone], this has been addressed as requested by the patient.

## 2017-08-23 PROCEDURE — 665998 HH PPS REVENUE CREDIT

## 2017-08-23 PROCEDURE — 665999 HH PPS REVENUE DEBIT

## 2017-08-24 ENCOUNTER — OFFICE VISIT (OUTPATIENT)
Dept: URGENT CARE | Facility: PHYSICIAN GROUP | Age: 27
End: 2017-08-24
Payer: MEDICARE

## 2017-08-24 ENCOUNTER — OFFICE VISIT (OUTPATIENT)
Dept: BEHAVIORAL HEALTH | Facility: PHYSICIAN GROUP | Age: 27
End: 2017-08-24
Payer: MEDICARE

## 2017-08-24 ENCOUNTER — APPOINTMENT (OUTPATIENT)
Dept: URGENT CARE | Facility: CLINIC | Age: 27
End: 2017-08-24
Payer: MEDICARE

## 2017-08-24 VITALS
SYSTOLIC BLOOD PRESSURE: 124 MMHG | DIASTOLIC BLOOD PRESSURE: 84 MMHG | BODY MASS INDEX: 43.82 KG/M2 | HEART RATE: 76 BPM | WEIGHT: 263 LBS | OXYGEN SATURATION: 97 % | RESPIRATION RATE: 20 BRPM | HEIGHT: 65 IN | TEMPERATURE: 98.4 F

## 2017-08-24 VITALS
HEART RATE: 74 BPM | TEMPERATURE: 98.4 F | BODY MASS INDEX: 48.58 KG/M2 | HEIGHT: 62 IN | DIASTOLIC BLOOD PRESSURE: 80 MMHG | RESPIRATION RATE: 14 BRPM | SYSTOLIC BLOOD PRESSURE: 126 MMHG | WEIGHT: 264 LBS

## 2017-08-24 DIAGNOSIS — F25.1 SCHIZOAFFECTIVE DISORDER, DEPRESSIVE TYPE (HCC): ICD-10-CM

## 2017-08-24 DIAGNOSIS — N61.0 CELLULITIS OF RIGHT BREAST: ICD-10-CM

## 2017-08-24 DIAGNOSIS — S21.001A OPEN WOUND OF BREAST, RIGHT, INITIAL ENCOUNTER: ICD-10-CM

## 2017-08-24 PROCEDURE — 99214 OFFICE O/P EST MOD 30 MIN: CPT | Performed by: NURSE PRACTITIONER

## 2017-08-24 PROCEDURE — 665999 HH PPS REVENUE DEBIT

## 2017-08-24 PROCEDURE — 665998 HH PPS REVENUE CREDIT

## 2017-08-24 PROCEDURE — 99213 OFFICE O/P EST LOW 20 MIN: CPT | Performed by: PSYCHIATRY & NEUROLOGY

## 2017-08-24 PROCEDURE — 90833 PSYTX W PT W E/M 30 MIN: CPT | Performed by: PSYCHIATRY & NEUROLOGY

## 2017-08-24 RX ORDER — DOXYCYCLINE HYCLATE 100 MG
100 TABLET ORAL 2 TIMES DAILY
Qty: 20 TAB | Refills: 0 | Status: SHIPPED | OUTPATIENT
Start: 2017-08-24 | End: 2017-09-07

## 2017-08-24 ASSESSMENT — ENCOUNTER SYMPTOMS
WEAKNESS: 0
FEVER: 0
MYALGIAS: 0
BRUISES/BLEEDS EASILY: 0
CHILLS: 0

## 2017-08-24 NOTE — PROGRESS NOTES
"Subjective:      Kristin Balderrama is a 26 y.o. female who presents with Sore            HPI  Kristin is a 26 year old female who is here for unhealed sore on right breast x 3 months. Was seen last month for same thing. Was treated for cellulitis to left breast.  had unhealing sore on left breast which she states was a \"cyst\" and an US was done with negative results found.  has been seen by dermatology and gynecology for breast sores with no resolution. H/o uncontrolled diabetes. States blood sugars can run between \"150s-300s\" depending on what she eats. Lives in group home and does not have choices of what she eats. States drainage from site is yellow with \"new\" redness around site. Denies fever, tenderness or swelling of breast. Has been covering area with bactroban and bandage but states still \"not healing\".     PMH:  has a past medical history of Scoliosis; Multiple personality disorder; Chronic UTI (9/18/2010); Heart burn; Pain (08-15-12); History of falling; Sinus tachycardia (10/31/2013); Urinary incontinence; Hypertension; Disorder of thyroid; Obesity; Pneumonia (2012); ASTHMA; Breath shortness; Anginal syndrome; Psychosis; Arthritis; PCOS (polycystic ovarian syndrome); Gynecological disorder; Renal disorder; Arrhythmia; Urinary bladder disorder; Tuberculosis; Sleep apnea; Mitochondrial disease; Fall; and Borderline personality disorder.  MEDS:   Current outpatient prescriptions:   •  doxycycline (VIBRAMYCIN) 100 MG Tab, Take 1 Tab by mouth 2 times a day., Disp: 20 Tab, Rfl: 0  •  silver sulfADIAZINE (SILVADENE) 1 % Cream, Apply small amount to affected area daily until healed, loosely cover with bandage, Disp: 20 g, Rfl: 0  •  nystatin (MYCOSTATIN) 899732 UNIT/GM Cream topical cream, Apply 1 g to affected area(s) 2 times a day., Disp: 1 Tube, Rfl: 5  •  neomycin-polymyxin-HC (PEDIOTIC HC) 3.5-38107-2 Suspension, Place 5 Drops in ear 3 times a day., Disp: 1 Bottle, Rfl: 0  •  fluoxetine " (PROZAC) 10 MG Cap, TAKE ONE CAPSULE BY MOUTH ONCE A DAY, Disp: 30 Cap, Rfl: 2  •  ziprasidone (GEODON) 80 MG Cap, Take 1 Cap by mouth 2 Times a Day., Disp: 60 Cap, Rfl: 2  •  fluticasone (FLONASE) 50 MCG/ACT nasal spray, Spray 2 Sprays in nose every day., Disp: 16 g, Rfl: 11  •  loratadine (CLARITIN) 10 MG Tab, Take 1 Tab by mouth every day., Disp: 30 Tab, Rfl:   •  ziprasidone (GEODON) 40 MG Cap, One cap by mouth at noon after meal., Disp: 30 Cap, Rfl: 2  •  oxycodone-acetaminophen (PERCOCET-10)  MG Tab, Take 2 Tabs by mouth 2 Times a Day. Indications: Pain, Disp: , Rfl:   •  doxycycline (VIBRAMYCIN) 100 MG Tab, Take 100 mg by mouth 2 times a day. Pt started on 7/23/2017 for 10 day course for cellulitis of breast, Disp: , Rfl:   •  mupirocin (BACTROBAN) 2 % Ointment, Apply 1 Application to affected area(s) every day. Pt started on 7/23/2017 for cellulitis of breast, Disp: , Rfl:   •  tobramycin (TOBREX) 0.3 % Solution ophthalmic solution, Place 2 Drops in both eyes every 4 hours. Pt started on 7/4/2017 for 7 day course for ear infection., Disp: , Rfl:   •  sitagliptin (JANUVIA) 100 MG Tab, Take 1 Tab by mouth every day., Disp: 30 Tab, Rfl: 6  •  ziprasidone (GEODON) 80 MG Cap, TAKE 2 CAPSULES BY MOUTH NIGHTLY AT BEDTIME, Disp: 60 Cap, Rfl: 5  •  GRALISE 600 MG Tab, Take 600 mg by mouth 3 times a day., Disp: , Rfl:   •  nitroGLYCERIN (NITROSTAT) 0.3 MG SL tablet, PLACE 1 TABLET UNDER TONGUE IF NEEDED FOR CHEST PAIN EVERY 5 MINUTES FOR 3 DOSES AS DIRECTED, Disp: , Rfl: 0  •  Mirabegron ER (MYRBETRIQ) 25 MG TABLET SR 24 HR, Take 1 Tab by mouth every day., Disp: , Rfl:   •  lactobacillus granules (LACTINEX/FLORANEX) Pack, Take 1 Packet by mouth 3 times a day, with meals., Disp: , Rfl:   •  levothyroxine (SYNTHROID) 75 MCG Tab, Take 75 mcg by mouth Every morning on an empty stomach., Disp: , Rfl:   •  lactulose 10 GM/15ML Solution, Take 10 g by mouth 2 times a day as needed (For constipation)., Disp: , Rfl:   •   "furosemide (LASIX) 40 MG Tab, Take 40 mg by mouth every day., Disp: , Rfl:   •  Cranberry 400 MG Tab, Take 2 Tabs by mouth every day., Disp: , Rfl:   •  promethazine (PHENERGAN) 25 MG Tab, Take 1 Tab by mouth every 6 hours as needed for Nausea/Vomiting., Disp: 30 Tab, Rfl: 6  •  aspirin EC (ECOTRIN) 81 MG Tablet Delayed Response, Take 1 Tab by mouth every day., Disp: 30 Tab, Rfl: 6  •  baclofen (LIORESAL) 10 MG Tab, Take 1 Tab by mouth 3 times a day., Disp: 90 Tab, Rfl: 6  •  ivabradine (CORLANOR) 5 MG Tab tablet, Take 1 Tab by mouth 2 times a day, with meals., Disp: 60 Tab, Rfl: 6  •  Melatonin 5 MG Tab, Take 10 mg by mouth every bedtime., Disp: , Rfl:   •  fentanyl (DURAGESIC) 25 MCG/HR PATCH 72 HR, Apply 1 Patch to skin as directed every 72 hours., Disp: , Rfl:   •  vitamin D, Ergocalciferol, (DRISDOL) 57132 UNITS Cap capsule, Take 50,000 Units by mouth every Friday., Disp: , Rfl:   •  cyanocobalamin (HM VITAMIN B12) 500 MCG tablet, Take 500 mcg by mouth 2 times a day., Disp: , Rfl:   •  sodium bicarbonate 325 MG Tab, Take 2 Tabs by mouth 3 times a day., Disp: , Rfl:   ALLERGIES:   Allergies   Allergen Reactions   • Cefdinir Shortness of Breath and Itching     Tolerated 1/18/17   • Depakote [Divalproex Sodium] Unspecified     Muscle spasms/muscle pain and weakness     • Abilify Unspecified     Headaches/muscle twitching     • Amitriptyline Unspecified     Headaches     • Amoxicillin Rash     Pt states \"I get a rash\".     • Ciprofloxacin Rash     Pt states \"I get a rash\".     • Clindamycin Nausea     Even with food     • Ees [Erythromycin] Vomiting and Nausea   • Flagyl [Metronidazole Hcl] Unspecified     \"eye problems\"     • Flomax [Tamsulosin Hydrochloride] Swelling   • Metformin Unspecified     Increased lactic acid      • Sulfa Drugs Hives and Rash     RXN=since childhood   • Tape Rash     Tears skin off   coban with Tegaderm tape ok  RXN=ongoing   • Vancomycin Itching     Pt becomes flushed in face and chest. "   RXN=7/10/16   • Cephalexin [Keflex] Rash     Pt states she gets a rash with this medication   • Erythromycin Rash     .   • Levofloxacin Unspecified     Leg muscle cramps   • Metronidazole Rash     .   • Valproic Acid Rash     .     SURGHX:   Past Surgical History   Procedure Laterality Date   • Neuro dest facet l/s w/ig sngl  4/21/2015     Performed by Reza Tabor at SURGERY [facility] ORS   • Recovery  1/27/2016     Procedure: CATH LAB EP STUDY WITH SINUS NODE MODIFICATION ABHINAV;  Surgeon: Recoveryon Surgery;  Location: SURGERY PRE-POST PROC UNIT [facility];  Service:    • Katie by laparoscopy  8/29/2010     Performed by SHAYY JOHNS at SURGERY [facility]   • Lumbar fusion anterior  8/21/2012     Performed by SUSIE SAWANT at SURGERY [facility]   • Other cardiac surgery  1/2016     cardiac ablation   • Tonsillectomy       tonsillectomy   • Bowel stimulator insertion  7/13/2016     Procedure: BOWEL STIMULATOR INSERTION FOR PERMANENT INTERSTIM SACRAL IMPLANT;  Surgeon: Joe Noyola M.D.;  Location: SURGERY [facility];  Service:    • Gastroscopy with balloon dilatation N/A 1/4/2017     Procedure: GASTROSCOPY WITH DILATATION;  Surgeon: Torres Vargas M.D.;  Location: SURGERY [facility];  Service:    • Muscle biopsy Right 1/26/2017     Procedure: MUSCLE BIOPSY - THIGH;  Surgeon: Isidro Vigil M.D.;  Location: SURGERY [facility];  Service:    • Other abdominal surgery       SOCHX:  reports that she has never smoked. She has never used smokeless tobacco. She reports that she does not drink alcohol or use illicit drugs.  FH: Family history was reviewed, no pertinent findings to report    Review of Systems   Constitutional: Negative for fever, chills and malaise/fatigue.   Musculoskeletal: Negative for myalgias.   Skin: Negative for itching and rash.   Neurological: Negative for weakness.   Endo/Heme/Allergies: Does not bruise/bleed easily.   All other systems reviewed and are  "negative.         Objective:     /84 mmHg  Pulse 76  Temp(Src) 36.9 °C (98.4 °F)  Resp 20  Ht 1.651 m (5' 5\")  Wt 119.296 kg (263 lb)  BMI 43.77 kg/m2  SpO2 97%  LMP 08/24/2015     Physical Exam   Constitutional: She is oriented to person, place, and time. Vital signs are normal. She appears well-developed and well-nourished. She is active and cooperative.  Non-toxic appearance. She does not have a sickly appearance. She does not appear ill. No distress.   HENT:   Head: Normocephalic.   Eyes: Conjunctivae and EOM are normal. Pupils are equal, round, and reactive to light.   Cardiovascular: Normal rate.    Pulmonary/Chest: Effort normal. She exhibits no mass, no tenderness, no bony tenderness, no laceration, no edema, no deformity and no swelling. Right breast exhibits no inverted nipple, no mass, no nipple discharge, no skin change and no tenderness. There is no breast swelling.       Has oblong shaped 1 cm superficial open wound with some evidence of tissue granulation, no discharge seen at this time but states has wiped this off. Redness surrounding edge of wound opening.    Genitourinary: No breast tenderness, discharge or bleeding.   Musculoskeletal: Normal range of motion.   Neurological: She is alert and oriented to person, place, and time.   Skin: Skin is warm and dry. No rash noted. She is not diaphoretic. There is erythema.   Vitals reviewed.              Assessment/Plan:     1. Open wound of breast, right, initial encounter  - silver sulfADIAZINE (SILVADENE) 1 % Cream; Apply small amount to affected area daily until healed, loosely cover with bandage  Dispense: 20 g; Refill: 0    2. Cellulitis of right breast    - doxycycline (VIBRAMYCIN) 100 MG Tab; Take 1 Tab by mouth 2 times a day.  Dispense: 20 Tab; Refill: 0    D/c Bactroban  May clean area with mild soap, do not scrub, wash with tepid water, pat dry  May use NSAID for any pain/discomfort  Monitor for increase in rash size or areas " affected, pain, swelling, redness with blistering, fever, increased drainage- re-evaluate

## 2017-08-24 NOTE — PROGRESS NOTES
"[facility]  PSYCHIATRIC FOLLOW-UP NOTE    Name: Kristin Balderrama  MRN: [id number]  : 1990  Age: 26 y.o.  Date of assessment: 2017  PCP: Torres Brody M.D.  Persons in attendance: Patient  REASON FOR VISIT/CHIEF COMPLAINT (as stated by Patient):  Kristin Balderrama is a 26 y.o., White female, attending follow-up appointment for   Chief Complaint   Patient presents with   • Medication Management     The patient is seen today for follow up and she has been sleeping a lot and feels tired but denied depression.   .      HISTORY OF PRESENT ILLNESS:    The patient came for follow up using a walker. The patient has been in bed all the time because she is feeling tired. The patient denied fever and chills. The patient denied depression. The patient does not like to go any place because of her tiredness. The patient denied psychosis. The patient has been taking geodon 80 mg one twice a day and prozac 10 mg per day.    PSYCHOSOCIAL CHANGES SINCE PREVIOUS CONTACT:  Tiredness but denied depression.    RESPONSE TO TREATMENT:  The patients physical complaints unchanged.     MEDICATION SIDE EFFECTS:  The patient has mild abnormal movement of her tongue.    REVIEW OF SYSTEMS:        Constitutional positive - obesity , chronic weakness   Eyes negative   Ears/Nose/Mouth/Throat negative   Cardiovascular positive - hypertension   Respiratory positive - obstructive sleep apnea   Gastrointestinal positive - fatty liver disease   Genitourinary positive - chronic UTI, polycystic ovarian syndrome   Muscular negative   Integumentary negative   Neurological positive - peripheral neuropathy   Endocrine positive - hypothyroidism   Hematologic/Lymphatic negative       PSYCHIATRIC EXAMINATION/MENTAL STATUS  /80 mmHg  Pulse 74  Temp(Src) 36.9 °C (98.4 °F)  Resp 14  Ht 1.575 m (5' 2.01\")  Wt 119.75 kg (264 lb)  BMI 48.27 kg/m2  Participation: Active verbal participation and " Attentive  Grooming:Casual  Orientation: Alert and Fully Oriented  Eye contact: Good  Behavior:Calm   Mood: Anxious  Affect: Anxious  Thought process: Logical and Goal-directed  Thought content:  Within normal limits  Speech: Rate within normal limits and Volume within normal limits  Perception:  Within normal limits  Memory:  No gross evidence of memory deficits  Insight: Good  Judgment: Good  Family/couple interaction observations:   Other:    Current risk:    Suicide: Low   Homicide: Low   Self-harm: Low  Relapse: Low  Other:   Crisis Safety Plan reviewed?Yes  If evidence of imminent risk is present, intervention/plan:    Medical Records/Labs/Diagnostic Tests Reviewed: yes.    Medical Records/Labs/Diagnostic Tests Ordered: none.    DIAGNOSTIC IMPRESSION(S):  1. Schizoaffective disorder, depressive type (CMS-HCC)           ASSESSMENT AND PLAN:    1. Schizoaffective disorder.  Ziprasidone 80 mg BID  Fluoxetine 10 mg per day.    2. Borderline personality disorder.  3. Follow up in six weeks.  The patient agreed to go to [facility] today.    16 minutes were spent in psychotherapy.  (If greater than 16 minutes, add 55021 code)   Topics addressed in psychotherapy include:  The patient is working on her cognitive restructuring and behavioral changes.  We talked about her maladaptive thinking activated during depression reflect negative evaluation of her self  The patient is focused on herself and that is increasing her anxiety.  taught her today how to manage her stress effectively  And use relaxation and meditation.   Ronny Burnette M.D.

## 2017-08-24 NOTE — MR AVS SNAPSHOT
"        Kristin Armstrong Tacos   2017 12:40 PM   Office Visit   MRN: [id number]    Department:  [facility]   Dept Phone:  [phone]    Description:  Female : 1990   Provider:  KURT Dove           Reason for Visit     Sore x3 months. pt has sore right breast - tried steroid, wasnt successful       Allergies as of 2017     Allergen Noted Reactions    Cefdinir 2016   Shortness of Breath, Itching    Tolerated 17    Depakote [Divalproex Sodium] 2010   Unspecified    Muscle spasms/muscle pain and weakness      Abilify 2013   Unspecified    Headaches/muscle twitching      Amitriptyline 10/31/2013   Unspecified    Headaches      Amoxicillin 2010   Rash    Pt states \"I get a rash\".      Ciprofloxacin 2009   Rash    Pt states \"I get a rash\".      Clindamycin 2011   Nausea    Even with food      Ees [Erythromycin] 2010   Vomiting, Nausea    Flagyl [Metronidazole Hcl] 2011   Unspecified    \"eye problems\"      Flomax [Tamsulosin Hydrochloride] 2009   Swelling    Metformin 2013   Unspecified    Increased lactic acid       Sulfa Drugs 2010   Hives, Rash    RXN=since childhood    Tape 08/15/2012   Rash    Tears skin off   coban with Tegaderm tape ok  RXN=ongoing    Vancomycin 07/10/2016   Itching    Pt becomes flushed in face and chest.   RXN=7/10/16    Cephalexin [Keflex] 2017   Rash    Pt states she gets a rash with this medication    Erythromycin 2017   Rash    .    Levofloxacin 10/27/2016   Unspecified    Leg muscle cramps    Metronidazole 2017   Rash    .    Valproic Acid 2017   Rash    .      You were diagnosed with     Open wound of breast, right, initial encounter   [517313]       Cellulitis of right breast   [7484257]         Vital Signs     Blood Pressure Pulse Temperature Respirations Height Weight    124/84 mmHg 76 36.9 °C (98.4 °F) 20 1.651 m (5' 5\") 119.296 kg (263 lb)    Body Mass " Index Oxygen Saturation Last Menstrual Period Smoking Status          43.77 kg/m2 97% 08/24/2015 Never Smoker         Basic Information     Date Of Birth Sex Race Ethnicity Preferred Language    1990 Female White Non- English      Your appointments     Oct 06, 2017  7:20 AM   Established Patient with Torres Brody M.D.   [facility] [address]    [address]  [address]  [place] [address]   [phone]           You will be receiving a confirmation call a few days before your appointment from our automated call confirmation system.              Problem List              ICD-10-CM Priority Class Noted - Resolved    Chronic UTI (Chronic) N39.0 Low  9/18/2010 - Present    Borderline personality disorder in adult F60.3 Low  9/18/2010 - Present    Neurogenic bladder N31.9 Low  4/2/2011 - Present    Sinus tachycardia (Chronic) R00.0 High  10/31/2013 - Present    Knee pain, right M25.561 Low  2/13/2014 - Present    Anxiety F41.9 Low  12/16/2014 - Present    Fatty liver disease, nonalcoholic K76.0 Low  1/19/2015 - Present    Progressive focal motor weakness R53.1 Low  6/28/2015 - Present    Acquired hypothyroidism E03.9 Low  11/23/2015 - Present    PCOS (polycystic ovarian syndrome) E28.2 Low  11/23/2015 - Present    H/O prior ablation treatment Z98.890   2/10/2016 - Present    Peripheral neuropathy (CMS-HCC) (Chronic) G62.9   3/6/2016 - Present    TONYA on CPAP G47.33, Z99.89 Low  3/7/2016 - Present    Morbidly obese (CMS-HCC) E66.01   3/7/2016 - Present    Scoliosis M41.9   3/7/2016 - Present    GERD (gastroesophageal reflux disease) (Chronic) K21.9   3/7/2016 - Present    Vitamin D deficiency E55.9   5/21/2016 - Present    Chronic inflammatory arthritis (Chronic) M19.90 Medium  5/23/2016 - Present    Weakness of both lower extremities R29.898   6/22/2016 - Present    Elevated sedimentation rate R70.0   6/27/2016 - Present    Galactorrhea O92.6   7/22/2016 - Present    Psychosis,  schizophrenia, simple (HCC) (Chronic) F20.89 Low Chronic 9/29/2016 - Present    Schizophrenia (CMS-HCC) F20.9 Low  10/27/2016 - Present    Chronic pain syndrome (Chronic) G89.4   10/27/2016 - Present    Bowel and bladder incontinence R32, R15.9   10/27/2016 - Present    Depression F32.9 Low  10/28/2016 - Present    HTN (hypertension) (Chronic) I10   11/1/2016 - Present    Hypovitaminosis D E55.9   11/29/2016 - Present    Leg weakness R29.898   1/4/2017 - Present    Weakness R53.1   1/22/2017 - Present    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 High  1/24/2017 - Present    Chronic suprapubic catheter (CMS-HCC) (Chronic) Z93.59   2/16/2017 - Present    Leg weakness, bilateral R29.898   2/22/2017 - Present    Weakness of right upper extremity R29.898   2/23/2017 - Present    Chest pain R07.9   3/30/2017 - Present    On home oxygen therapy (Chronic) Z99.81   4/15/2017 - Present    Dysuria R30.0   5/9/2017 - Present    UTI (urinary tract infection) N39.0   5/13/2017 - Present    Weakness R53.1   5/13/2017 - Present    Enterococcus faecalis infection B95.2   5/13/2017 - Present    Fall at home W19.XXXA, Y92.099   5/13/2017 - Present    Hashimoto's encephalopathy E06.3, G93.49   5/17/2017 - Present    Rash R21   6/9/2017 - Present    IGT (impaired glucose tolerance) R73.02   7/6/2017 - Present    Accidental overdose T50.901A   8/4/2017 - Present    Hypothyroidism (Chronic) E03.9   8/4/2017 - Present    Right leg weakness R29.898 Medium  8/4/2017 - Present      Health Maintenance        Date Due Completion Dates    IMM VARICELLA (CHICKENPOX) VACCINE (1 of 2 - 2 Dose Adolescent Series) 10/13/2002 ---    IMM INFLUENZA (1) 9/1/2017 10/5/2016, 9/5/2013, 12/7/2011, 11/7/2011    A1C SCREENING 1/6/2018 7/6/2017, 6/28/2017, 4/6/2017, 12/7/2016, 10/14/2016, 3/9/2016, 9/5/2014    IMM HEP A VACCINE (2 of 2 - Standard Series) 1/18/2018 7/18/2017    URINE ACR / MICROALBUMIN 5/5/2018 5/5/2017, 3/7/2017, 12/7/2016, 10/14/2016,  "7/28/2016    RETINAL SCREENING 5/23/2018 5/23/2017    DIABETES MONOFILAMENT / LE EXAM 5/23/2018 5/23/2017    FASTING LIPID PROFILE 8/4/2018 8/4/2017, 7/6/2017, 3/7/2017, 2/24/2017, 12/7/2016, 10/28/2016, 10/14/2016, 3/21/2014    SERUM CREATININE 8/4/2018 8/4/2017, 8/4/2017, 7/24/2017, 7/19/2017, 7/6/2017, 6/21/2017, 5/18/2017, 5/17/2017, 5/14/2017, 5/13/2017, 5/5/2017, 4/14/2017, 4/13/2017, 4/12/2017, 4/5/2017, 3/27/2017, 3/18/2017, 3/17/2017, 3/16/2017, 3/11/2017, 3/10/2017, 3/9/2017, 3/7/2017, 2/25/2017, 2/24/2017, 2/23/2017, 2/22/2017, 1/30/2017, 1/27/2017, 1/25/2017, 1/22/2017, 1/22/2017, 1/18/2017, 1/18/2017, 12/7/2016, 12/6/2016, 11/4/2016, 11/3/2016, 11/2/2016, 11/1/2016, 10/28/2016, 10/27/2016, 10/14/2016, 10/4/2016, 10/3/2016, 9/29/2016, 8/16/2016, 7/28/2016, 7/28/2016, 7/10/2016, 6/25/2016, 6/23/2016, 6/22/2016, 6/7/2016, 5/18/2016, 4/19/2016, 4/3/2016, 3/30/2016, 3/29/2016, 3/28/2016, 3/14/2016, 3/10/2016, 3/9/2016, 3/7/2016, 3/6/2016, 2/15/2016, 2/12/2016, 1/29/2016, 1/28/2016, 1/26/2016, 11/28/2015, 11/27/2015, 11/23/2015, 11/22/2015, 7/9/2015, 7/8/2015, 6/27/2015, 4/14/2015, 3/23/2015, 2/18/2015, 10/27/2014, 9/5/2014, 3/21/2014, 12/24/2013, 1/12/2013, 8/24/2012, 8/23/2012, 8/22/2012, 8/15/2012, 4/21/2012, 4/20/2012, 4/19/2012, 9/17/2011, 4/3/2011, 4/2/2011, 3/31/2011, 9/20/2010, 9/19/2010, 9/18/2010, 8/28/2010, 7/20/2010, 1/30/2007    PAP SMEAR 7/22/2019 7/22/2016 (Postponed), 2/19/2013 (N/S)    Override on 7/22/2016: Postponed (per pt was told could \"skip\" 2016)    Override on 2/19/2013: (N/S) ([place])    COLONOSCOPY 9/25/2023 9/25/2013    IMM DTaP/Tdap/Td Vaccine (8 - Td) 7/23/2027 7/23/2017, 8/6/2012, 9/18/2010, 3/3/1994, 5/17/1991, 5/10/1990, 3/23/1990, 1990            Current Immunizations     Dtap Vaccine 3/3/1994, 5/17/1991, 5/10/1990, 3/23/1990, 1990    HIB Vaccine(PEDVAX) 1/11/1991    HPV Quadrivalent Vaccine (GARDASIL) 10/27/2011, 5/27/2011, 3/1/2011    Hepatitis A Vaccine, Adult " 7/18/2017  2:15 PM    Hepatitis B Vaccine Non-Recombivax (Ped/Adol) 1/28/2004, 10/28/2003, 10/29/1999    Influenza TIV (IM) 9/5/2013, 12/7/2011, 11/7/2011    Influenza Vaccine Adult HD 10/5/2016    MMR Vaccine 3/3/1994, 1/11/1991    OPV - Historical Data 3/3/1994, 5/17/1991, 5/10/1990, 3/23/1990, 1990    Pneumococcal Vaccine (PCV7) Historical Data 11/8/2015    Pneumococcal polysaccharide vaccine (PPSV-23) 1/23/2017  5:35 PM, 1/14/2017    TD Vaccine 9/18/2010  4:45 PM    Tdap Vaccine 7/23/2017, 8/6/2012      Below and/or attached are the medications your provider expects you to take. Review all of your home medications and newly ordered medications with your provider and/or pharmacist. Follow medication instructions as directed by your provider and/or pharmacist. Please keep your medication list with you and share with your provider. Update the information when medications are discontinued, doses are changed, or new medications (including over-the-counter products) are added; and carry medication information at all times in the event of emergency situations     Allergies:  CEFDINIR - Shortness of Breath,Itching     DEPAKOTE - Unspecified     ABILIFY - Unspecified     AMITRIPTYLINE - Unspecified     AMOXICILLIN - Rash     CIPROFLOXACIN - Rash     CLINDAMYCIN - Nausea     EES - Vomiting,Nausea     FLAGYL - Unspecified     FLOMAX - Swelling     METFORMIN - Unspecified     SULFA DRUGS - Hives,Rash     TAPE - Rash     VANCOMYCIN - Itching     CEPHALEXIN - Rash     ERYTHROMYCIN - Rash     LEVOFLOXACIN - Unspecified     METRONIDAZOLE - Rash     VALPROIC ACID - Rash               Medications  Valid as of: August 24, 2017 -  3:46 PM    Generic Name Brand Name Tablet Size Instructions for use    Aspirin (Tablet Delayed Response) ECOTRIN 81 MG Take 1 Tab by mouth every day.        Baclofen (Tab) LIORESAL 10 MG Take 1 Tab by mouth 3 times a day.        Cranberry (Tab) Cranberry 400 MG Take 2 Tabs by mouth every day.         Cyanocobalamin (Tab) vitamin b12 500 MCG Take 500 mcg by mouth 2 times a day.        Doxycycline Hyclate (Tab) VIBRAMYCIN 100 MG Take 100 mg by mouth 2 times a day. Pt started on 7/23/2017 for 10 day course for cellulitis of breast        Doxycycline Hyclate (Tab) VIBRAMYCIN 100 MG Take 1 Tab by mouth 2 times a day.        Ergocalciferol (Cap) DRISDOL 27774 units Take 50,000 Units by mouth every Friday.        FentaNYL (PATCH 72 HR) DURAGESIC 25 MCG/HR Apply 1 Patch to skin as directed every 72 hours.        FLUoxetine HCl (Cap) PROZAC 10 MG TAKE ONE CAPSULE BY MOUTH ONCE A DAY        Fluticasone Propionate (Suspension) FLONASE 50 MCG/ACT Spray 2 Sprays in nose every day.        Furosemide (Tab) LASIX 40 MG Take 40 mg by mouth every day.        Gabapentin (Once-Daily) (Tab) GRALISE 600 MG Take 600 mg by mouth 3 times a day.        Ivabradine HCl (Tab) CORLANOR 5 MG Take 1 Tab by mouth 2 times a day, with meals.        Lactobacillus (Pack) LACTINEX/FLORANEX  Take 1 Packet by mouth 3 times a day, with meals.        Lactulose (Solution) lactulose 10 GM/15ML Take 10 g by mouth 2 times a day as needed (For constipation).        Levothyroxine Sodium (Tab) SYNTHROID 75 MCG Take 75 mcg by mouth Every morning on an empty stomach.        Loratadine (Tab) CLARITIN 10 MG Take 1 Tab by mouth every day.        Melatonin (Tab) Melatonin 5 MG Take 10 mg by mouth every bedtime.        Mirabegron (TABLET SR 24 HR) Mirabegron ER 25 MG Take 1 Tab by mouth every day.        Mupirocin (Ointment) BACTROBAN 2 % Apply 1 Application to affected area(s) every day. Pt started on 7/23/2017 for cellulitis of breast        Neomycin-Polymyxin-HC (Suspension) PEDIOTIC HC 3.5-00037-5 Place 5 Drops in ear 3 times a day.        Nitroglycerin (SL Tab) NITROSTAT 0.3 MG PLACE 1 TABLET UNDER TONGUE IF NEEDED FOR CHEST PAIN EVERY 5 MINUTES FOR 3 DOSES AS DIRECTED        Nystatin (Cream) MYCOSTATIN 299451 UNIT/GM Apply 1 g to affected area(s) 2 times a day.         Oxycodone-Acetaminophen (Tab) PERCOCET-10  MG Take 2 Tabs by mouth 2 Times a Day. Indications: Pain        Promethazine HCl (Tab) PHENERGAN 25 MG Take 1 Tab by mouth every 6 hours as needed for Nausea/Vomiting.        Silver Sulfadiazine (Cream) SILVADENE 1 % Apply small amount to affected area daily until healed, loosely cover with bandage        SITagliptin Phosphate (Tab) JANUVIA 100 MG Take 1 Tab by mouth every day.        Sodium Bicarbonate (Tab) sodium bicarbonate 325 MG Take 2 Tabs by mouth 3 times a day.        Tobramycin (Solution) TOBREX 0.3 % Place 2 Drops in both eyes every 4 hours. Pt started on 7/4/2017 for 7 day course for ear infection.        Ziprasidone HCl (Cap) GEODON 80 MG TAKE 2 CAPSULES BY MOUTH NIGHTLY AT BEDTIME        Ziprasidone HCl (Cap) GEODON 40 MG One cap by mouth at noon after meal.        Ziprasidone HCl (Cap) GEODON 80 MG Take 1 Cap by mouth 2 Times a Day.        .                 Medicines prescribed today were sent to:     [id number] - [place], [place] - [address] [address]    Phone: [phone] Fax: [phone]    Open 24 Hours?: No      Medication refill instructions:       If your prescription bottle indicates you have medication refills left, it is not necessary to call your provider’s office. Please contact your pharmacy and they will refill your medication.    If your prescription bottle indicates you do not have any refills left, you may request refills at any time through one of the following ways: The online [email or web address] system (except Urgent Care), by calling your provider’s office, or by asking your pharmacy to contact your provider’s office with a refill request. Medication refills are processed only during regular business hours and may not be available until the next business day. Your provider may request additional information or to have a follow-up visit with you prior to refilling your medication.   *Please  Note: Medication refills are assigned a new Rx number when refilled electronically. Your pharmacy may indicate that no refills were authorized even though a new prescription for the same medication is available at the pharmacy. Please request the medicine by name with the pharmacy before contacting your provider for a refill.        Other Notes About Your Plan     DME:  [facility] /  [phone] / fax [phone]              MyChart Access Code: Activation code not generated  Current MyChart Status: Active

## 2017-08-25 ENCOUNTER — APPOINTMENT (OUTPATIENT)
Dept: PHYSICAL THERAPY | Facility: REHABILITATION | Age: 27
End: 2017-08-25
Attending: INTERNAL MEDICINE
Payer: MEDICARE

## 2017-08-25 PROCEDURE — 665998 HH PPS REVENUE CREDIT

## 2017-08-25 PROCEDURE — 665999 HH PPS REVENUE DEBIT

## 2017-08-26 ENCOUNTER — HOSPITAL ENCOUNTER (OUTPATIENT)
Dept: LAB | Facility: MEDICAL CENTER | Age: 27
End: 2017-08-26
Attending: INTERNAL MEDICINE
Payer: MEDICARE

## 2017-08-26 DIAGNOSIS — R30.0 DYSURIA: ICD-10-CM

## 2017-08-26 LAB
APPEARANCE UR: CLEAR
BACTERIA #/AREA URNS HPF: ABNORMAL /HPF
BILIRUB UR QL STRIP.AUTO: NEGATIVE
COLOR UR: YELLOW
CULTURE IF INDICATED INDCX: YES UA CULTURE
EPI CELLS #/AREA URNS HPF: ABNORMAL /HPF
GLUCOSE UR STRIP.AUTO-MCNC: NEGATIVE MG/DL
HYALINE CASTS #/AREA URNS LPF: ABNORMAL /LPF
KETONES UR STRIP.AUTO-MCNC: NEGATIVE MG/DL
LEUKOCYTE ESTERASE UR QL STRIP.AUTO: ABNORMAL
MICRO URNS: ABNORMAL
NITRITE UR QL STRIP.AUTO: NEGATIVE
PH UR STRIP.AUTO: 5.5 [PH]
PROT UR QL STRIP: NEGATIVE MG/DL
RBC # URNS HPF: ABNORMAL /HPF
RBC UR QL AUTO: NEGATIVE
RENAL EPI CELLS #/AREA URNS HPF: ABNORMAL /HPF
SP GR UR STRIP.AUTO: 1.02
UROBILINOGEN UR STRIP.AUTO-MCNC: 0.2 MG/DL
WBC #/AREA URNS HPF: ABNORMAL /HPF

## 2017-08-26 PROCEDURE — 665998 HH PPS REVENUE CREDIT

## 2017-08-26 PROCEDURE — 81001 URINALYSIS AUTO W/SCOPE: CPT

## 2017-08-26 PROCEDURE — 87086 URINE CULTURE/COLONY COUNT: CPT

## 2017-08-26 PROCEDURE — 665999 HH PPS REVENUE DEBIT

## 2017-08-27 LAB
BACTERIA UR CULT: NORMAL
SIGNIFICANT IND 70042: NORMAL
SOURCE SOURCE: NORMAL

## 2017-08-27 PROCEDURE — 665999 HH PPS REVENUE DEBIT

## 2017-08-27 PROCEDURE — 665998 HH PPS REVENUE CREDIT

## 2017-08-28 ENCOUNTER — APPOINTMENT (OUTPATIENT)
Dept: PHYSICAL THERAPY | Facility: REHABILITATION | Age: 27
End: 2017-08-28
Attending: INTERNAL MEDICINE
Payer: MEDICARE

## 2017-08-29 ENCOUNTER — OFFICE VISIT (OUTPATIENT)
Dept: MEDICAL GROUP | Facility: MEDICAL CENTER | Age: 27
End: 2017-08-29
Payer: MEDICARE

## 2017-08-29 ENCOUNTER — APPOINTMENT (OUTPATIENT)
Dept: BEHAVIORAL HEALTH | Facility: PHYSICIAN GROUP | Age: 27
End: 2017-08-29
Payer: MEDICARE

## 2017-08-29 VITALS
OXYGEN SATURATION: 97 % | WEIGHT: 269.4 LBS | TEMPERATURE: 98.4 F | BODY MASS INDEX: 44.89 KG/M2 | DIASTOLIC BLOOD PRESSURE: 82 MMHG | HEART RATE: 80 BPM | SYSTOLIC BLOOD PRESSURE: 116 MMHG | HEIGHT: 65 IN | RESPIRATION RATE: 16 BRPM

## 2017-08-29 DIAGNOSIS — G43.409 HEMIPLEGIC MIGRAINE WITHOUT STATUS MIGRAINOSUS, NOT INTRACTABLE: ICD-10-CM

## 2017-08-29 DIAGNOSIS — Z23 INFLUENZA VACCINE NEEDED: ICD-10-CM

## 2017-08-29 PROCEDURE — G0008 ADMIN INFLUENZA VIRUS VAC: HCPCS | Performed by: INTERNAL MEDICINE

## 2017-08-29 PROCEDURE — 99214 OFFICE O/P EST MOD 30 MIN: CPT | Mod: 25 | Performed by: INTERNAL MEDICINE

## 2017-08-29 PROCEDURE — 90686 IIV4 VACC NO PRSV 0.5 ML IM: CPT | Performed by: INTERNAL MEDICINE

## 2017-08-29 NOTE — PROGRESS NOTES
CC: Follow-up hospitalization headaches.    HPI:   Kristin presents today with the following.    1. Hemiplegic 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, not intractable  Presented to an outside hospital results not available. She reports she had a severe one-sided headache with one-sided weakness. While in the hospital she reports she had imaging that was reportedly normal. When she woke in the hospital she was able to use her left side and her headache was present but significantly improved. She does not have a history of migraines specifically. She does have an undiagnosed intermittent lower extremity paralysis which previous neurologist nor Nalini, with a specific etiology. There is some question about conversion disorder. She would like to see a new neurologist and states her headache is now quite tolerable but still slightly present with some mild nausea.    2. Influenza vaccine needed        Patient Active Problem List    Diagnosis Date Noted   • Paraparesis of both lower limbs (CMS-HCC) 01/24/2017     Priority: High   • Sinus tachycardia 10/31/2013     Priority: High   • Right leg weakness 08/04/2017     Priority: Medium   • Chronic inflammatory arthritis 05/23/2016     Priority: Medium   • Depression 10/28/2016     Priority: Low   • Schizophrenia (CMS-HCC) 10/27/2016     Priority: Low   • Psychosis, schizophrenia, simple ([facility]) 09/29/2016     Priority: Low     Class: Chronic   • TONYA on CPAP 03/07/2016     Priority: Low   • Acquired hypothyroidism 11/23/2015     Priority: Low   • PCOS (polycystic ovarian syndrome) 11/23/2015     Priority: Low   • Progressive focal motor weakness 06/28/2015     Priority: Low   • Fatty liver disease, nonalcoholic 01/19/2015     Priority: Low   • Anxiety 12/16/2014     Priority: Low   • Knee pain, right 02/13/2014     Priority: Low   • Neurogenic bladder 04/02/2011     Priority: Low   • Chronic UTI 09/18/2010     Priority: Low   • Borderline personality disorder in adult 09/18/2010      Priority: Low   • Accidental overdose 08/04/2017   • Hypothyroidism 08/04/2017   • IGT (impaired glucose tolerance) 07/06/2017   • Rash 06/09/2017   • Hashimoto's encephalopathy 05/17/2017   • UTI (urinary tract infection) 05/13/2017   • Weakness 05/13/2017   • Enterococcus faecalis infection 05/13/2017   • Fall at home 05/13/2017   • Dysuria 05/09/2017   • On home oxygen therapy 04/15/2017   • Chest pain 03/30/2017   • Weakness of right upper extremity 02/23/2017   • Leg weakness, bilateral 02/22/2017   • Chronic suprapubic catheter (CMS-HCC) 02/16/2017   • Weakness 01/22/2017   • Leg weakness 01/04/2017   • Hypovitaminosis D 11/29/2016   • HTN (hypertension) 11/01/2016   • Chronic pain syndrome 10/27/2016   • Bowel and bladder incontinence 10/27/2016   • Galactorrhea 07/22/2016   • Elevated sedimentation rate 06/27/2016   • Weakness of both lower extremities 06/22/2016   • Vitamin D deficiency 05/21/2016   • Morbidly obese (CMS-HCC) 03/07/2016   • Scoliosis 03/07/2016   • GERD (gastroesophageal reflux disease) 03/07/2016   • Peripheral neuropathy (CMS-HCC) 03/06/2016   • H/O prior ablation treatment 02/10/2016       Current Outpatient Prescriptions   Medication Sig Dispense Refill   • doxycycline (VIBRAMYCIN) 100 MG Tab Take 1 Tab by mouth 2 times a day. 20 Tab 0   • fluoxetine (PROZAC) 10 MG Cap TAKE ONE CAPSULE BY MOUTH ONCE A DAY 30 Cap 2   • ziprasidone (GEODON) 80 MG Cap Take 1 Cap by mouth 2 Times a Day. 60 Cap 2   • fluticasone (FLONASE) 50 MCG/ACT nasal spray Spray 2 Sprays in nose every day. 16 g 11   • loratadine (CLARITIN) 10 MG Tab Take 1 Tab by mouth every day. 30 Tab    • oxycodone-acetaminophen (PERCOCET-10)  MG Tab Take 2 Tabs by mouth 2 Times a Day. Indications: Pain     • mupirocin (BACTROBAN) 2 % Ointment Apply 1 Application to affected area(s) every day. Pt started on 7/23/2017 for cellulitis of breast     • sitagliptin (JANUVIA) 100 MG Tab Take 1 Tab by mouth every day. 30 Tab 6   •  GRALISE 600 MG Tab Take 600 mg by mouth 3 times a day.     • nitroGLYCERIN (NITROSTAT) 0.3 MG SL tablet PLACE 1 TABLET UNDER TONGUE IF NEEDED FOR CHEST PAIN EVERY 5 MINUTES FOR 3 DOSES AS DIRECTED  0   • Mirabegron ER (MYRBETRIQ) 25 MG TABLET SR 24 HR Take 1 Tab by mouth every day.     • lactobacillus granules (LACTINEX/FLORANEX) Pack Take 1 Packet by mouth 3 times a day, with meals.     • levothyroxine (SYNTHROID) 75 MCG Tab Take 75 mcg by mouth Every morning on an empty stomach.     • lactulose 10 GM/15ML Solution Take 10 g by mouth 2 times a day as needed (For constipation).     • furosemide (LASIX) 40 MG Tab Take 40 mg by mouth every day.     • promethazine (PHENERGAN) 25 MG Tab Take 1 Tab by mouth every 6 hours as needed for Nausea/Vomiting. 30 Tab 6   • aspirin EC (ECOTRIN) 81 MG Tablet Delayed Response Take 1 Tab by mouth every day. 30 Tab 6   • baclofen (LIORESAL) 10 MG Tab Take 1 Tab by mouth 3 times a day. 90 Tab 6   • ivabradine (CORLANOR) 5 MG Tab tablet Take 1 Tab by mouth 2 times a day, with meals. 60 Tab 6   • Melatonin 5 MG Tab Take 10 mg by mouth every bedtime.     • fentanyl (DURAGESIC) 25 MCG/HR PATCH 72 HR Apply 1 Patch to skin as directed every 72 hours.     • vitamin D, Ergocalciferol, (DRISDOL) 32304 UNITS Cap capsule Take 50,000 Units by mouth every Friday.     • cyanocobalamin (HM VITAMIN B12) 500 MCG tablet Take 500 mcg by mouth 2 times a day.     • sodium bicarbonate 325 MG Tab Take 2 Tabs by mouth 3 times a day.     • silver sulfADIAZINE (SILVADENE) 1 % Cream Apply small amount to affected area daily until healed, loosely cover with bandage 20 g 0     No current facility-administered medications for this visit.          Allergies as of 08/29/2017 - Reviewed 08/29/2017   Allergen Reaction Noted   • Cefdinir Shortness of Breath and Itching 03/01/2016   • Depakote [divalproex sodium] Unspecified 06/14/2010   • Abilify Unspecified 01/17/2013   • Amitriptyline Unspecified 10/31/2013   •  "Amoxicillin Rash 09/18/2010   • Ciprofloxacin Rash 12/17/2009   • Clindamycin Nausea 02/02/2011   • Ees [erythromycin] Vomiting and Nausea 08/28/2010   • Flagyl [metronidazole hcl] Unspecified 03/31/2011   • Flomax [tamsulosin hydrochloride] Swelling 09/24/2009   • Metformin Unspecified 07/23/2013   • Sulfa drugs Hives and Rash 09/18/2010   • Tape Rash 08/15/2012   • Vancomycin Itching 07/10/2016   • Cephalexin [keflex] Rash 01/01/2017   • Erythromycin Rash 03/30/2017   • Levofloxacin Unspecified 10/27/2016   • Metronidazole Rash 03/30/2017   • Valproic acid Rash 03/30/2017        ROS: As per HPI.    /82   Pulse 80   Temp 36.9 °C (98.4 °F)   Resp 16   Ht 1.651 m (5' 5\")   Wt 122.2 kg (269 lb 6.4 oz)   LMP 08/24/2015   SpO2 97%   BMI 44.83 kg/m²     Physical Exam:  Gen:         Alert and oriented, No apparent distress.  Neck:        No Lymphadenopathy or Bruits.  Lungs:     Clear to auscultation bilaterally  CV:          Regular rate and rhythm. No murmurs, rubs or gallops.               Ext:          No clubbing, cyanosis, edema.      Assessment and Plan.   26 y.o. female with the following issues.    1. Hemiplegic 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, not intractable  We'll get hospital records to ensure full workup. At her request referring to a new neurologist. Have recommended Phenergan sure he has on hand for nausea will await hospital records.  - REFERRAL TO NEUROLOGY    2. Influenza vaccine needed    - INFLUENZA VACCINE QUAD INJ >3Y(PF)    Discussed diet exercise and weight loss strategies. Have set a goal for 15 pounds in 3 months and will followup at that time.Kristin Balderrama's body mass index is 44.83 kg/m².  Weight management topics discussed at this encounter include:exercise counseling and nutrition counseling performed.    "

## 2017-08-31 ENCOUNTER — APPOINTMENT (OUTPATIENT)
Dept: PHYSICAL THERAPY | Facility: REHABILITATION | Age: 27
End: 2017-08-31
Attending: INTERNAL MEDICINE
Payer: MEDICARE

## 2017-09-04 ENCOUNTER — APPOINTMENT (OUTPATIENT)
Dept: PHYSICAL THERAPY | Facility: REHABILITATION | Age: 27
End: 2017-09-04
Attending: INTERNAL MEDICINE
Payer: MEDICARE

## 2017-09-07 ENCOUNTER — OFFICE VISIT (OUTPATIENT)
Dept: MEDICAL GROUP | Facility: MEDICAL CENTER | Age: 27
End: 2017-09-07
Payer: MEDICARE

## 2017-09-07 ENCOUNTER — APPOINTMENT (OUTPATIENT)
Dept: PHYSICAL THERAPY | Facility: REHABILITATION | Age: 27
End: 2017-09-07
Attending: INTERNAL MEDICINE
Payer: MEDICARE

## 2017-09-07 VITALS
SYSTOLIC BLOOD PRESSURE: 140 MMHG | DIASTOLIC BLOOD PRESSURE: 90 MMHG | HEART RATE: 90 BPM | TEMPERATURE: 98.6 F | OXYGEN SATURATION: 91 % | RESPIRATION RATE: 16 BRPM

## 2017-09-07 DIAGNOSIS — G43.411 INTRACTABLE HEMIPLEGIC MIGRAINE WITH STATUS MIGRAINOSUS: ICD-10-CM

## 2017-09-07 PROCEDURE — 99214 OFFICE O/P EST MOD 30 MIN: CPT | Performed by: INTERNAL MEDICINE

## 2017-09-07 RX ORDER — SUMATRIPTAN 100 MG/1
100 TABLET, FILM COATED ORAL
Qty: 10 TAB | Refills: 3 | Status: SHIPPED | OUTPATIENT
Start: 2017-09-07 | End: 2017-09-27

## 2017-09-07 NOTE — PROGRESS NOTES
CC: Hemiplegic migraine.    HPI:   Kristin presents today with the following.    1. Intractable hemiplegic migraine with status migrainosus  Presents after being at the ER last night for similar symptoms of left-sided numbness and severe headache. She was given medications with significant improvement but after getting to the office today she began having another headache. She is quite somnolent and only minimally responsive to questions. She does respond to painful stimuli. She did have extensive imaging results are not available currently but was discharged home. She's had multiple other similar episodes in the past. She is not able to give history nor is she able to respond to stimulus and cannot walk.      Patient Active Problem List    Diagnosis Date Noted   • Paraparesis of both lower limbs (CMS-HCC) 01/24/2017     Priority: High   • Sinus tachycardia 10/31/2013     Priority: High   • Right leg weakness 08/04/2017     Priority: Medium   • Chronic inflammatory arthritis 05/23/2016     Priority: Medium   • Depression 10/28/2016     Priority: Low   • Schizophrenia (CMS-HCC) 10/27/2016     Priority: Low   • Psychosis, schizophrenia, simple (formerly [facility]) 09/29/2016     Priority: Low     Class: Chronic   • TONYA on CPAP 03/07/2016     Priority: Low   • Acquired hypothyroidism 11/23/2015     Priority: Low   • PCOS (polycystic ovarian syndrome) 11/23/2015     Priority: Low   • Progressive focal motor weakness 06/28/2015     Priority: Low   • Fatty liver disease, nonalcoholic 01/19/2015     Priority: Low   • Anxiety 12/16/2014     Priority: Low   • Knee pain, right 02/13/2014     Priority: Low   • Neurogenic bladder 04/02/2011     Priority: Low   • Chronic UTI 09/18/2010     Priority: Low   • Borderline personality disorder in adult 09/18/2010     Priority: Low   • Intractable hemiplegic migraine with status migrainosus 09/07/2017   • Accidental overdose 08/04/2017   • Hypothyroidism 08/04/2017   • IGT (impaired glucose  tolerance) 07/06/2017   • Rash 06/09/2017   • Hashimoto's encephalopathy 05/17/2017   • UTI (urinary tract infection) 05/13/2017   • Weakness 05/13/2017   • Enterococcus faecalis infection 05/13/2017   • Fall at home 05/13/2017   • Dysuria 05/09/2017   • On home oxygen therapy 04/15/2017   • Chest pain 03/30/2017   • Weakness of right upper extremity 02/23/2017   • Leg weakness, bilateral 02/22/2017   • Chronic suprapubic catheter (CMS-HCC) 02/16/2017   • Weakness 01/22/2017   • Leg weakness 01/04/2017   • Hypovitaminosis D 11/29/2016   • HTN (hypertension) 11/01/2016   • Chronic pain syndrome 10/27/2016   • Bowel and bladder incontinence 10/27/2016   • Galactorrhea 07/22/2016   • Elevated sedimentation rate 06/27/2016   • Weakness of both lower extremities 06/22/2016   • Vitamin D deficiency 05/21/2016   • Morbidly obese (CMS-HCC) 03/07/2016   • Scoliosis 03/07/2016   • GERD (gastroesophageal reflux disease) 03/07/2016   • Peripheral neuropathy (CMS-HCC) 03/06/2016   • H/O prior ablation treatment 02/10/2016       Current Outpatient Prescriptions   Medication Sig Dispense Refill   • sumatriptan (IMITREX) 100 MG tablet Take 1 Tab by mouth Once PRN for Migraine for up to 1 dose. 10 Tab 3   • silver sulfADIAZINE (SILVADENE) 1 % Cream Apply small amount to affected area daily until healed, loosely cover with bandage 20 g 0   • fluoxetine (PROZAC) 10 MG Cap TAKE ONE CAPSULE BY MOUTH ONCE A DAY 30 Cap 2   • ziprasidone (GEODON) 80 MG Cap Take 1 Cap by mouth 2 Times a Day. 60 Cap 2   • fluticasone (FLONASE) 50 MCG/ACT nasal spray Spray 2 Sprays in nose every day. 16 g 11   • loratadine (CLARITIN) 10 MG Tab Take 1 Tab by mouth every day. 30 Tab    • oxycodone-acetaminophen (PERCOCET-10)  MG Tab Take 2 Tabs by mouth 2 Times a Day. Indications: Pain     • mupirocin (BACTROBAN) 2 % Ointment Apply 1 Application to affected area(s) every day. Pt started on 7/23/2017 for cellulitis of breast     • sitagliptin (JANUVIA) 100  MG Tab Take 1 Tab by mouth every day. 30 Tab 6   • GRALISE 600 MG Tab Take 600 mg by mouth 3 times a day.     • nitroGLYCERIN (NITROSTAT) 0.3 MG SL tablet PLACE 1 TABLET UNDER TONGUE IF NEEDED FOR CHEST PAIN EVERY 5 MINUTES FOR 3 DOSES AS DIRECTED  0   • Mirabegron ER (MYRBETRIQ) 25 MG TABLET SR 24 HR Take 1 Tab by mouth every day.     • lactobacillus granules (LACTINEX/FLORANEX) Pack Take 1 Packet by mouth 3 times a day, with meals.     • levothyroxine (SYNTHROID) 75 MCG Tab Take 75 mcg by mouth Every morning on an empty stomach.     • lactulose 10 GM/15ML Solution Take 10 g by mouth 2 times a day as needed (For constipation).     • furosemide (LASIX) 40 MG Tab Take 40 mg by mouth every day.     • promethazine (PHENERGAN) 25 MG Tab Take 1 Tab by mouth every 6 hours as needed for Nausea/Vomiting. 30 Tab 6   • aspirin EC (ECOTRIN) 81 MG Tablet Delayed Response Take 1 Tab by mouth every day. 30 Tab 6   • baclofen (LIORESAL) 10 MG Tab Take 1 Tab by mouth 3 times a day. 90 Tab 6   • ivabradine (CORLANOR) 5 MG Tab tablet Take 1 Tab by mouth 2 times a day, with meals. 60 Tab 6   • Melatonin 5 MG Tab Take 10 mg by mouth every bedtime.     • fentanyl (DURAGESIC) 25 MCG/HR PATCH 72 HR Apply 1 Patch to skin as directed every 72 hours.     • vitamin D, Ergocalciferol, (DRISDOL) 04683 UNITS Cap capsule Take 50,000 Units by mouth every Friday.     • cyanocobalamin (HM VITAMIN B12) 500 MCG tablet Take 500 mcg by mouth 2 times a day.     • sodium bicarbonate 325 MG Tab Take 2 Tabs by mouth 3 times a day.       No current facility-administered medications for this visit.          Allergies as of 09/07/2017 - Reviewed 09/07/2017   Allergen Reaction Noted   • Cefdinir Shortness of Breath and Itching 03/01/2016   • Depakote [divalproex sodium] Unspecified 06/14/2010   • Abilify Unspecified 01/17/2013   • Amitriptyline Unspecified 10/31/2013   • Amoxicillin Rash 09/18/2010   • Ciprofloxacin Rash 12/17/2009   • Clindamycin Nausea  02/02/2011   • Ees [erythromycin] Vomiting and Nausea 08/28/2010   • Flagyl [metronidazole hcl] Unspecified 03/31/2011   • Flomax [tamsulosin hydrochloride] Swelling 09/24/2009   • Metformin Unspecified 07/23/2013   • Sulfa drugs Hives and Rash 09/18/2010   • Tape Rash 08/15/2012   • Vancomycin Itching 07/10/2016   • Cephalexin [keflex] Rash 01/01/2017   • Erythromycin Rash 03/30/2017   • Levofloxacin Unspecified 10/27/2016   • Metronidazole Rash 03/30/2017   • Valproic acid Rash 03/30/2017        ROS: As per HPI.    /90   Pulse 90   Temp 37 °C (98.6 °F)   Resp 16   SpO2 91%     Physical Exam:  Gen:         Alert and oriented, No apparent distress.  Neck:        No Lymphadenopathy or Bruits.  Lungs:     Clear to auscultation bilaterally  CV:          Regular rate and rhythm. No murmurs, rubs or gallops.               Ext:          No clubbing, cyanosis, edema.      Assessment and Plan.   26 y.o. female with the following issues.    1. Intractable hemiplegic migraine with status migrainosus  Given inability to arouse completely although this is likely hemiplegic migraine no medications to break the cycle have sent back to the emergency room. [facility] was called and discussed the case with [facility]'s ER doctor. We'll try to expedite referral to neurology. Have given a prescription for Imitrex as a trial for the onset of her next headache.

## 2017-09-12 ENCOUNTER — OFFICE VISIT (OUTPATIENT)
Dept: MEDICAL GROUP | Facility: MEDICAL CENTER | Age: 27
End: 2017-09-12
Payer: MEDICARE

## 2017-09-12 VITALS
OXYGEN SATURATION: 92 % | HEART RATE: 92 BPM | SYSTOLIC BLOOD PRESSURE: 122 MMHG | DIASTOLIC BLOOD PRESSURE: 80 MMHG | TEMPERATURE: 98.6 F | WEIGHT: 262 LBS | HEIGHT: 65 IN | RESPIRATION RATE: 20 BRPM | BODY MASS INDEX: 43.65 KG/M2

## 2017-09-12 DIAGNOSIS — H10.9 CONJUNCTIVITIS, UNSPECIFIED CONJUNCTIVITIS TYPE, UNSPECIFIED LATERALITY: ICD-10-CM

## 2017-09-12 DIAGNOSIS — H60.501 ACUTE OTITIS EXTERNA OF RIGHT EAR, UNSPECIFIED TYPE: ICD-10-CM

## 2017-09-12 PROCEDURE — 99213 OFFICE O/P EST LOW 20 MIN: CPT | Performed by: NURSE PRACTITIONER

## 2017-09-12 RX ORDER — POLYMYXIN B SULFATE AND TRIMETHOPRIM 1; 10000 MG/ML; [USP'U]/ML
1 SOLUTION OPHTHALMIC EVERY 4 HOURS
Qty: 10 ML | Refills: 0 | Status: SHIPPED | OUTPATIENT
Start: 2017-09-12 | End: 2017-09-19

## 2017-09-12 RX ORDER — NEOMYCIN SULFATE, POLYMYXIN B SULFATE AND HYDROCORTISONE 10; 3.5; 1 MG/ML; MG/ML; [USP'U]/ML
4 SUSPENSION/ DROPS AURICULAR (OTIC) 3 TIMES DAILY
Qty: 1 BOTTLE | Refills: 0 | Status: SHIPPED | OUTPATIENT
Start: 2017-09-12 | End: 2018-01-09

## 2017-09-12 ASSESSMENT — PAIN SCALES - GENERAL: PAINLEVEL: 8=MODERATE-SEVERE PAIN

## 2017-09-14 ENCOUNTER — OFFICE VISIT (OUTPATIENT)
Dept: NEUROLOGY | Facility: MEDICAL CENTER | Age: 27
End: 2017-09-14
Payer: MEDICARE

## 2017-09-14 VITALS
HEIGHT: 65 IN | OXYGEN SATURATION: 96 % | WEIGHT: 260 LBS | DIASTOLIC BLOOD PRESSURE: 84 MMHG | HEART RATE: 81 BPM | TEMPERATURE: 97.7 F | SYSTOLIC BLOOD PRESSURE: 122 MMHG | BODY MASS INDEX: 43.32 KG/M2

## 2017-09-14 DIAGNOSIS — G44.011 INTRACTABLE EPISODIC CLUSTER HEADACHE: ICD-10-CM

## 2017-09-14 PROCEDURE — S0020 INJECTION, BUPIVICAINE HYDRO: HCPCS | Performed by: PHYSICIAN ASSISTANT

## 2017-09-14 PROCEDURE — 99214 OFFICE O/P EST MOD 30 MIN: CPT | Mod: 25 | Performed by: PHYSICIAN ASSISTANT

## 2017-09-14 PROCEDURE — 64405 NJX AA&/STRD GR OCPL NRV: CPT | Mod: 50 | Performed by: PHYSICIAN ASSISTANT

## 2017-09-14 RX ORDER — TRIAMCINOLONE ACETONIDE 40 MG/ML
80 INJECTION, SUSPENSION INTRA-ARTICULAR; INTRAMUSCULAR ONCE
Status: COMPLETED | OUTPATIENT
Start: 2017-09-14 | End: 2017-09-14

## 2017-09-14 RX ORDER — BUPIVACAINE HYDROCHLORIDE 5 MG/ML
10 INJECTION, SOLUTION EPIDURAL; INTRACAUDAL ONCE
Status: COMPLETED | OUTPATIENT
Start: 2017-09-14 | End: 2017-09-14

## 2017-09-14 RX ORDER — SUMATRIPTAN 20 MG/1
1 SPRAY NASAL PRN
Qty: 10 EACH | Refills: 11 | Status: SHIPPED | OUTPATIENT
Start: 2017-09-14 | End: 2018-03-15

## 2017-09-14 RX ADMIN — TRIAMCINOLONE ACETONIDE 80 MG: 40 INJECTION, SUSPENSION INTRA-ARTICULAR; INTRAMUSCULAR at 14:37

## 2017-09-14 RX ADMIN — BUPIVACAINE HYDROCHLORIDE 10 ML: 5 INJECTION, SOLUTION EPIDURAL; INTRACAUDAL at 14:37

## 2017-09-14 ASSESSMENT — ENCOUNTER SYMPTOMS
TREMORS: 0
FOCAL WEAKNESS: 0
RESPIRATORY NEGATIVE: 1
BLURRED VISION: 0
EYE PAIN: 0
SENSORY CHANGE: 0
EYE DISCHARGE: 1
DOUBLE VISION: 0
SORE THROAT: 0
LOSS OF CONSCIOUSNESS: 0
PHOTOPHOBIA: 1
EYE REDNESS: 1
DIZZINESS: 0
SEIZURES: 0
CARDIOVASCULAR NEGATIVE: 1
HEADACHES: 1
CONSTITUTIONAL NEGATIVE: 1
TINGLING: 0
SPEECH CHANGE: 0

## 2017-09-14 NOTE — PROGRESS NOTES
"Subjective:      Kristin Balderrama is a 26 y.o. female who presents with Eye Drainage (since yesterday)            Reports crusting over of bilateral eyes since yesterday. And right ear pain. She does report removal of tube by her ear nose and throat doctor. She denies any other symptoms no cough no nasal drainage no shortness of breath. Denies fever nausea vomiting diarrhea      Eye Drainage   This is a new problem. The current episode started yesterday. The problem occurs constantly. Associated symptoms include headaches. Pertinent negatives include no congestion or sore throat. Associated symptoms comments: Ear pain on right. Pt has had tube removal recently.       Review of Systems   Constitutional: Negative.    HENT: Positive for ear pain. Negative for congestion, ear discharge, hearing loss, nosebleeds, sore throat and tinnitus.    Eyes: Positive for photophobia, discharge and redness. Negative for blurred vision, double vision and pain.   Respiratory: Negative.    Cardiovascular: Negative.    Skin: Negative.    Neurological: Positive for headaches. Negative for dizziness, tingling, tremors, sensory change, speech change, focal weakness, seizures and loss of consciousness.   Psychiatric/Behavioral:        Has underlying psychiatric disorder          Objective:     /80   Pulse 92   Temp 37 °C (98.6 °F)   Resp 20   Ht 1.651 m (5' 5\")   Wt 118.8 kg (262 lb)   SpO2 92%   BMI 43.60 kg/m²      Physical Exam   Constitutional: She is oriented to person, place, and time. No distress.   Obese female   HENT:   Head: Normocephalic.   Left Ear: External ear normal.   Nose: Nose normal.   Mouth/Throat: Oropharynx is clear and moist. No oropharyngeal exudate.   Right ear canal with some redness no drainage noted   Eyes: Conjunctivae and EOM are normal. Pupils are equal, round, and reactive to light. Right eye exhibits no discharge. Left eye exhibits no discharge.   Neck: Normal range of motion. Neck supple. " "  Cardiovascular: Normal rate and regular rhythm.    Pulmonary/Chest: Effort normal and breath sounds normal.   Lymphadenopathy:     She has no cervical adenopathy.   Neurological: She is alert and oriented to person, place, and time.   Skin: Skin is warm and dry. She is not diaphoretic.   Psychiatric: She has a normal mood and affect. Her behavior is normal.   Vitals reviewed.              Assessment/Plan:     1. Acute otitis externa of right ear, unspecified type  Evidence of mild otitis externa. Antibiotic drops as prescribed follow-up if no improvement.  - neomycin-polymyxin-HC (PEDIOTIC HC) 3.5-50459-2 Suspension; Place 4 Drops in ear 3 times a day.  Dispense: 1 Bottle; Refill: 0    2. Conjunctivitis, unspecified conjunctivitis type, unspecified laterality  No bacterial Conjunctivitis appreciated by me today on exam. I discussed with her that this is likely allergic conjunctivitis however she insists that it bacterial as she has the crusting in the morning with \"green goo.\"  - polymixin-trimethoprim (POLYTRIM) 36284-9.1 UNIT/ML-% Solution; Place 1 Drop in both eyes every 4 hours for 7 days.  Dispense: 10 mL; Refill: 0    Follow-up with pcp as planned follow up or sooner for new symptoms or should new problems arise      "

## 2017-09-14 NOTE — PATIENT INSTRUCTIONS
Acute/Rescue Medications:     Triptan - sumatriptan 20 mg nasal spay - use at onset of headache max 3 times week  Oxygen - 10 L via rebreather max for 15 minutes when headache starts    Daily Preventative Treatment: to consider at next visit if nerve block shots didn't work and headache continues despite having oxygen    Other:  ONB    Additional Testing: get [facility]'s records sent over here
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
## 2017-09-21 ENCOUNTER — APPOINTMENT (OUTPATIENT)
Dept: WOUND CARE | Facility: MEDICAL CENTER | Age: 27
End: 2017-09-21
Attending: INTERNAL MEDICINE
Payer: MEDICARE

## 2017-09-22 ENCOUNTER — APPOINTMENT (OUTPATIENT)
Dept: WOUND CARE | Facility: MEDICAL CENTER | Age: 27
End: 2017-09-22
Payer: MEDICARE

## 2017-09-22 ENCOUNTER — OFFICE VISIT (OUTPATIENT)
Dept: MEDICAL GROUP | Facility: MEDICAL CENTER | Age: 27
End: 2017-09-22
Payer: MEDICARE

## 2017-09-22 VITALS
TEMPERATURE: 97.8 F | OXYGEN SATURATION: 99 % | SYSTOLIC BLOOD PRESSURE: 122 MMHG | WEIGHT: 269 LBS | BODY MASS INDEX: 44.76 KG/M2 | DIASTOLIC BLOOD PRESSURE: 82 MMHG | HEART RATE: 83 BPM | RESPIRATION RATE: 16 BRPM

## 2017-09-22 DIAGNOSIS — R35.0 URINARY FREQUENCY: ICD-10-CM

## 2017-09-22 LAB
APPEARANCE UR: CLEAR
BILIRUB UR STRIP-MCNC: NORMAL MG/DL
COLOR UR AUTO: YELLOW
GLUCOSE UR STRIP.AUTO-MCNC: NORMAL MG/DL
KETONES UR STRIP.AUTO-MCNC: NORMAL MG/DL
LEUKOCYTE ESTERASE UR QL STRIP.AUTO: NORMAL
NITRITE UR QL STRIP.AUTO: NORMAL
PH UR STRIP.AUTO: 7 [PH] (ref 5–8)
PROT UR QL STRIP: NORMAL MG/DL
RBC UR QL AUTO: NORMAL
SP GR UR STRIP.AUTO: 1.02
UROBILINOGEN UR STRIP-MCNC: 0.2 MG/DL

## 2017-09-22 PROCEDURE — 81002 URINALYSIS NONAUTO W/O SCOPE: CPT | Performed by: INTERNAL MEDICINE

## 2017-09-22 PROCEDURE — 99213 OFFICE O/P EST LOW 20 MIN: CPT | Performed by: INTERNAL MEDICINE

## 2017-09-22 ASSESSMENT — PAIN SCALES - GENERAL: PAINLEVEL: 8=MODERATE-SEVERE PAIN

## 2017-09-22 NOTE — ASSESSMENT & PLAN NOTE
This patient comes in today reporting that for the last 3 or 4 days she has had some urinary frequency and mild dysuria. Nice fevers or chills or flank pain. She denies vaginal complaints. She notes that for the last few days she has been started on prednisone 70 mg daily treating optic neuritis. She denies other complaints at this time.

## 2017-09-22 NOTE — PROGRESS NOTES
Subjective:     Chief Complaint   Patient presents with   • UTI     Kristin Balderrama is a 26 y.o. female here today forEvaluation of urinary frequency.    UTI (urinary tract infection)  This patient comes in today reporting that for the last 3 or 4 days she has had some urinary frequency and mild dysuria. Nice fevers or chills or flank pain. She denies vaginal complaints. She notes that for the last few days she has been started on prednisone 70 mg daily treating optic neuritis. She denies other complaints at this time.       The encounter diagnosis was Urinary frequency.    Allergies: Cefdinir; Depakote [divalproex sodium]; Abilify; Amitriptyline; Amoxicillin; Ciprofloxacin; Clindamycin; Ees [erythromycin]; Flagyl [metronidazole hcl]; Flomax [tamsulosin hydrochloride]; Metformin; Sulfa drugs; Tape; Vancomycin; Cephalexin [keflex]; Erythromycin; Levofloxacin; Metronidazole; and Valproic acid  Current medicines (including changes today)  Current Outpatient Prescriptions   Medication Sig Dispense Refill   • sumatriptan (IMITREX) 20 MG/ACT nasal spray Spray 1 Spray in nose as needed for Migraine. 10 Each 11   • neomycin-polymyxin-HC (PEDIOTIC HC) 3.5-99219-4 Suspension Place 4 Drops in ear 3 times a day. 1 Bottle 0   • sumatriptan (IMITREX) 100 MG tablet Take 1 Tab by mouth Once PRN for Migraine for up to 1 dose. 10 Tab 3   • silver sulfADIAZINE (SILVADENE) 1 % Cream Apply small amount to affected area daily until healed, loosely cover with bandage 20 g 0   • fluoxetine (PROZAC) 10 MG Cap TAKE ONE CAPSULE BY MOUTH ONCE A DAY 30 Cap 2   • ziprasidone (GEODON) 80 MG Cap Take 1 Cap by mouth 2 Times a Day. 60 Cap 2   • fluticasone (FLONASE) 50 MCG/ACT nasal spray Spray 2 Sprays in nose every day. 16 g 11   • loratadine (CLARITIN) 10 MG Tab Take 1 Tab by mouth every day. 30 Tab    • oxycodone-acetaminophen (PERCOCET-10)  MG Tab Take 2 Tabs by mouth 2 Times a Day. Indications: Pain     • mupirocin (BACTROBAN) 2 %  Ointment Apply 1 Application to affected area(s) every day. Pt started on 7/23/2017 for cellulitis of breast     • sitagliptin (JANUVIA) 100 MG Tab Take 1 Tab by mouth every day. 30 Tab 6   • GRALISE 600 MG Tab Take 600 mg by mouth 3 times a day.     • nitroGLYCERIN (NITROSTAT) 0.3 MG SL tablet PLACE 1 TABLET UNDER TONGUE IF NEEDED FOR CHEST PAIN EVERY 5 MINUTES FOR 3 DOSES AS DIRECTED  0   • Mirabegron ER (MYRBETRIQ) 25 MG TABLET SR 24 HR Take 1 Tab by mouth every day.     • lactobacillus granules (LACTINEX/FLORANEX) Pack Take 1 Packet by mouth 3 times a day, with meals.     • levothyroxine (SYNTHROID) 75 MCG Tab Take 75 mcg by mouth Every morning on an empty stomach.     • lactulose 10 GM/15ML Solution Take 10 g by mouth 2 times a day as needed (For constipation).     • furosemide (LASIX) 40 MG Tab Take 40 mg by mouth every day.     • promethazine (PHENERGAN) 25 MG Tab Take 1 Tab by mouth every 6 hours as needed for Nausea/Vomiting. 30 Tab 6   • aspirin EC (ECOTRIN) 81 MG Tablet Delayed Response Take 1 Tab by mouth every day. 30 Tab 6   • baclofen (LIORESAL) 10 MG Tab Take 1 Tab by mouth 3 times a day. 90 Tab 6   • ivabradine (CORLANOR) 5 MG Tab tablet Take 1 Tab by mouth 2 times a day, with meals. 60 Tab 6   • Melatonin 5 MG Tab Take 10 mg by mouth every bedtime.     • fentanyl (DURAGESIC) 25 MCG/HR PATCH 72 HR Apply 1 Patch to skin as directed every 72 hours.     • vitamin D, Ergocalciferol, (DRISDOL) 59822 UNITS Cap capsule Take 50,000 Units by mouth every Friday.     • cyanocobalamin (HM VITAMIN B12) 500 MCG tablet Take 500 mcg by mouth 2 times a day.     • sodium bicarbonate 325 MG Tab Take 2 Tabs by mouth 3 times a day.       No current facility-administered medications for this visit.        She  has a past medical history of Anginal syndrome; Arrhythmia; Arthritis; ASTHMA; Borderline personality disorder; Breath shortness; Chronic UTI (9/18/2010); Disorder of thyroid; Fall; Gynecological disorder;  Headache; Heart burn; History of falling; Hypertension; Migraine; Mitochondrial disease; Multiple personality disorder; Obesity; Pain (08-15-12); PCOS (polycystic ovarian syndrome); Pneumonia (2012); Psychosis; Renal disorder; Scoliosis; Sinus tachycardia (10/31/2013); Sleep apnea; Tuberculosis; Urinary bladder disorder; and Urinary incontinence.    ROS    Patient denies significant change in strength, weight or appetite. No fevers or chills.  No indigestion, abdominal pain, or change in bowel habits.  Urinary frequency and some mild dysuria.  No new ankle swelling.       Objective:     PE:  /82   Pulse 83   Temp 36.6 °C (97.8 °F)   Resp 16   Wt 122 kg (269 lb)   SpO2 99%   BMI 44.76 kg/m²   Neck is supple without significant lymphadenopathy or masses.  Lungs are clear with normal breath sounds without wheezes or rales .  Cardiovascular: peripheral circulation is satisfactory, heart sounds are unchanged and unremarkable.  Abdomen is soft, without masses or tenderness, with normal bowel sounds.  Extremities are without significant edema, cyanosis or deformity.  Urinalysis is clear.    Assessment and Plan:   The following treatment plan was discussed  1. Urinary frequency      Treat with Azo. She also will use some Monistat cream. She will report any lack of improvement or other new symptoms.       Followup: She will keep her next scheduled appointment on October 6 or contact us as needed sooner.

## 2017-09-26 ENCOUNTER — APPOINTMENT (OUTPATIENT)
Dept: MEDICAL GROUP | Facility: MEDICAL CENTER | Age: 27
End: 2017-09-26
Payer: MEDICARE

## 2017-09-27 ENCOUNTER — OFFICE VISIT (OUTPATIENT)
Dept: MEDICAL GROUP | Facility: MEDICAL CENTER | Age: 27
End: 2017-09-27
Payer: MEDICARE

## 2017-09-27 VITALS
DIASTOLIC BLOOD PRESSURE: 80 MMHG | SYSTOLIC BLOOD PRESSURE: 118 MMHG | TEMPERATURE: 98.6 F | HEART RATE: 90 BPM | WEIGHT: 260 LBS | RESPIRATION RATE: 16 BRPM | HEIGHT: 65 IN | OXYGEN SATURATION: 93 % | BODY MASS INDEX: 43.32 KG/M2

## 2017-09-27 DIAGNOSIS — R21 RASH: ICD-10-CM

## 2017-09-27 PROCEDURE — 99214 OFFICE O/P EST MOD 30 MIN: CPT | Performed by: NURSE PRACTITIONER

## 2017-09-27 RX ORDER — FAMCICLOVIR 500 MG/1
500 TABLET ORAL 3 TIMES DAILY
Qty: 21 TAB | Refills: 0 | Status: SHIPPED | OUTPATIENT
Start: 2017-09-27 | End: 2017-10-04

## 2017-09-27 ASSESSMENT — PAIN SCALES - GENERAL: PAINLEVEL: 8=MODERATE-SEVERE PAIN

## 2017-09-27 ASSESSMENT — ENCOUNTER SYMPTOMS
CONSTITUTIONAL NEGATIVE: 1
CARDIOVASCULAR NEGATIVE: 1
NEUROLOGICAL NEGATIVE: 1
RESPIRATORY NEGATIVE: 1
EYES NEGATIVE: 1
ROS SKIN COMMENTS: SEE HPI

## 2017-09-27 NOTE — PROGRESS NOTES
"Subjective:      Kristin Balderrama is a 26 y.o. female who presents with Rash (under left breast x 2days)            Pt presents today with a two day history of painful rash across breasts and across bilateral abdoman at 11 th rib region-does not extend around to back  Denies pruritis   Denies drainage  She has not tried any topicals or any other otc or prescription medication  She denies fevers, n/v/d  She reports having chick pox as a child  She has been on several courses of oral steroids in the recent past  No eye involvement  No lesions to oropharynx       Rash         Review of Systems   Constitutional: Negative.    HENT: Negative.    Eyes: Negative.    Respiratory: Negative.    Cardiovascular: Negative.    Skin: Positive for rash. Negative for itching.        See hpi   Neurological: Negative.           Objective:     /80   Pulse 90   Temp 37 °C (98.6 °F)   Resp 16   Ht 1.651 m (5' 5\")   Wt 117.9 kg (260 lb)   SpO2 93%   BMI 43.27 kg/m²      Physical Exam   Constitutional: She is oriented to person, place, and time. No distress.   Obese female   HENT:   Head: Normocephalic.   Mouth/Throat: Oropharynx is clear and moist. No oropharyngeal exudate.   Eyes: Conjunctivae and EOM are normal. Pupils are equal, round, and reactive to light. Right eye exhibits no discharge. Left eye exhibits no discharge. No scleral icterus.   Cardiovascular: Normal rate and regular rhythm.    Pulmonary/Chest: Effort normal and breath sounds normal.   Neurological: She is alert and oriented to person, place, and time.   Skin: Skin is warm and dry. Rash noted. She is not diaphoretic.   Linear maculopapular rash across bilateral breast and at abdomen bilaterally. No vesiscles notes. No drainage.    Vitals reviewed.              Assessment/Plan:     1. Rash  Has some characteristics of zoster, although atypical presentation. Will treat for zoster.   Pt has f/u with pcp in one week.  Er precautions advised: -Any change or " worsening of signs or symptoms, patient encouraged to follow-up or report to emergency room for further evaluation. Patient understands and agrees  Patient understands and agrees to plan of care.      - famciclovir (FAMVIR) 500 MG Tab; Take 1 Tab by mouth 3 times a day for 7 days.  Dispense: 21 Tab; Refill: 0

## 2017-10-04 ENCOUNTER — OFFICE VISIT (OUTPATIENT)
Dept: MEDICAL GROUP | Facility: MEDICAL CENTER | Age: 27
End: 2017-10-04
Payer: MEDICARE

## 2017-10-04 VITALS
WEIGHT: 264.4 LBS | DIASTOLIC BLOOD PRESSURE: 80 MMHG | TEMPERATURE: 99.6 F | HEART RATE: 85 BPM | RESPIRATION RATE: 16 BRPM | SYSTOLIC BLOOD PRESSURE: 132 MMHG | BODY MASS INDEX: 44.05 KG/M2 | OXYGEN SATURATION: 95 % | HEIGHT: 65 IN

## 2017-10-04 DIAGNOSIS — I10 ESSENTIAL HYPERTENSION: Chronic | ICD-10-CM

## 2017-10-04 DIAGNOSIS — H46.9 OPTIC NEURITIS: ICD-10-CM

## 2017-10-04 DIAGNOSIS — E11.9 TYPE 2 DIABETES MELLITUS WITHOUT COMPLICATION, WITHOUT LONG-TERM CURRENT USE OF INSULIN (HCC): ICD-10-CM

## 2017-10-04 PROBLEM — R73.02 IGT (IMPAIRED GLUCOSE TOLERANCE): Status: RESOLVED | Noted: 2017-07-06 | Resolved: 2017-10-04

## 2017-10-04 PROBLEM — R29.898 LEG WEAKNESS: Status: RESOLVED | Noted: 2017-01-04 | Resolved: 2017-10-04

## 2017-10-04 PROBLEM — G43.411 INTRACTABLE HEMIPLEGIC MIGRAINE WITH STATUS MIGRAINOSUS: Status: RESOLVED | Noted: 2017-09-07 | Resolved: 2017-10-04

## 2017-10-04 LAB
HBA1C MFR BLD: 6.3 % (ref ?–5.8)
INT CON NEG: NEGATIVE
INT CON POS: POSITIVE

## 2017-10-04 PROCEDURE — 99214 OFFICE O/P EST MOD 30 MIN: CPT | Performed by: INTERNAL MEDICINE

## 2017-10-04 PROCEDURE — 83036 HEMOGLOBIN GLYCOSYLATED A1C: CPT | Performed by: INTERNAL MEDICINE

## 2017-10-04 NOTE — PROGRESS NOTES
CC: Follow-up multiple issues    HPI:   Kristin presents today with the following.    1. Essential hypertension  Blood pressure well controlled on current regimen denying any chest pain and increasing shortness of breath.    2. Type 2 diabetes mellitus without complication, without long-term current use of insulin (CMS-HCC)  She is maintained on single drug therapy is off chronic steroids despite having a recent need for problem below. A1c checked in office today at 6.3. She does check her sugars reports persistently 130. She does bring a large [facility] into the office with her to the visit today.    3. Optic neuritis  Recently diagnosed with optic neuritis. She did get a pulse dose of steroids and reports her vision is improving significantly. She is being seen by ophthalmology.      Patient Active Problem List    Diagnosis Date Noted   • Paraparesis of both lower limbs (CMS-HCC) 01/24/2017     Priority: High   • Sinus tachycardia 10/31/2013     Priority: High   • Right leg weakness 08/04/2017     Priority: Medium   • Chronic inflammatory arthritis 05/23/2016     Priority: Medium   • Depression 10/28/2016     Priority: Low   • Schizophrenia (CMS-HCC) 10/27/2016     Priority: Low   • Psychosis, schizophrenia, simple ([facility]) 09/29/2016     Priority: Low     Class: Chronic   • TONYA on CPAP 03/07/2016     Priority: Low   • Acquired hypothyroidism 11/23/2015     Priority: Low   • PCOS (polycystic ovarian syndrome) 11/23/2015     Priority: Low   • Progressive focal motor weakness 06/28/2015     Priority: Low   • Fatty liver disease, nonalcoholic 01/19/2015     Priority: Low   • Anxiety 12/16/2014     Priority: Low   • Knee pain, right 02/13/2014     Priority: Low   • Neurogenic bladder 04/02/2011     Priority: Low   • Chronic UTI 09/18/2010     Priority: Low   • Borderline personality disorder in adult 09/18/2010     Priority: Low   • Urinary frequency 09/22/2017   • Intractable episodic cluster headache  09/14/2017   • Accidental overdose 08/04/2017   • Hypothyroidism 08/04/2017   • Rash 06/09/2017   • Hashimoto's encephalopathy 05/17/2017   • UTI (urinary tract infection) 05/13/2017   • Weakness 05/13/2017   • Enterococcus faecalis infection 05/13/2017   • Fall at home 05/13/2017   • Dysuria 05/09/2017   • Type 2 diabetes mellitus without complication, without long-term current use of insulin (CMS-HCC) 04/26/2017   • On home oxygen therapy 04/15/2017   • Chest pain 03/30/2017   • Weakness of right upper extremity 02/23/2017   • Leg weakness, bilateral 02/22/2017   • Chronic suprapubic catheter (CMS-HCC) 02/16/2017   • Weakness 01/22/2017   • Hypovitaminosis D 11/29/2016   • HTN (hypertension) 11/01/2016   • Chronic pain syndrome 10/27/2016   • Bowel and bladder incontinence 10/27/2016   • Galactorrhea 07/22/2016   • Elevated sedimentation rate 06/27/2016   • Weakness of both lower extremities 06/22/2016   • Vitamin D deficiency 05/21/2016   • Morbidly obese (CMS-HCC) 03/07/2016   • Scoliosis 03/07/2016   • GERD (gastroesophageal reflux disease) 03/07/2016   • Peripheral neuropathy (CMS-HCC) 03/06/2016   • H/O prior ablation treatment 02/10/2016       Current Outpatient Prescriptions   Medication Sig Dispense Refill   • famciclovir (FAMVIR) 500 MG Tab Take 1 Tab by mouth 3 times a day for 7 days. 21 Tab 0   • sumatriptan (IMITREX) 20 MG/ACT nasal spray Spray 1 Spray in nose as needed for Migraine. 10 Each 11   • neomycin-polymyxin-HC (PEDIOTIC HC) 3.5-72629-0 Suspension Place 4 Drops in ear 3 times a day. 1 Bottle 0   • silver sulfADIAZINE (SILVADENE) 1 % Cream Apply small amount to affected area daily until healed, loosely cover with bandage 20 g 0   • fluoxetine (PROZAC) 10 MG Cap TAKE ONE CAPSULE BY MOUTH ONCE A DAY 30 Cap 2   • ziprasidone (GEODON) 80 MG Cap Take 1 Cap by mouth 2 Times a Day. 60 Cap 2   • fluticasone (FLONASE) 50 MCG/ACT nasal spray Spray 2 Sprays in nose every day. 16 g 11   • loratadine  (CLARITIN) 10 MG Tab Take 1 Tab by mouth every day. 30 Tab    • oxycodone-acetaminophen (PERCOCET-10)  MG Tab Take 2 Tabs by mouth 2 Times a Day. Indications: Pain     • mupirocin (BACTROBAN) 2 % Ointment Apply 1 Application to affected area(s) every day. Pt started on 7/23/2017 for cellulitis of breast     • sitagliptin (JANUVIA) 100 MG Tab Take 1 Tab by mouth every day. 30 Tab 6   • GRALISE 600 MG Tab Take 600 mg by mouth 3 times a day.     • nitroGLYCERIN (NITROSTAT) 0.3 MG SL tablet PLACE 1 TABLET UNDER TONGUE IF NEEDED FOR CHEST PAIN EVERY 5 MINUTES FOR 3 DOSES AS DIRECTED  0   • Mirabegron ER (MYRBETRIQ) 25 MG TABLET SR 24 HR Take 1 Tab by mouth every day.     • lactobacillus granules (LACTINEX/FLORANEX) Pack Take 1 Packet by mouth 3 times a day, with meals.     • levothyroxine (SYNTHROID) 75 MCG Tab Take 75 mcg by mouth Every morning on an empty stomach.     • lactulose 10 GM/15ML Solution Take 10 g by mouth 2 times a day as needed (For constipation).     • furosemide (LASIX) 40 MG Tab Take 40 mg by mouth every day.     • promethazine (PHENERGAN) 25 MG Tab Take 1 Tab by mouth every 6 hours as needed for Nausea/Vomiting. 30 Tab 6   • aspirin EC (ECOTRIN) 81 MG Tablet Delayed Response Take 1 Tab by mouth every day. 30 Tab 6   • baclofen (LIORESAL) 10 MG Tab Take 1 Tab by mouth 3 times a day. 90 Tab 6   • ivabradine (CORLANOR) 5 MG Tab tablet Take 1 Tab by mouth 2 times a day, with meals. 60 Tab 6   • Melatonin 5 MG Tab Take 10 mg by mouth every bedtime.     • fentanyl (DURAGESIC) 25 MCG/HR PATCH 72 HR Apply 1 Patch to skin as directed every 72 hours.     • vitamin D, Ergocalciferol, (DRISDOL) 57826 UNITS Cap capsule Take 50,000 Units by mouth every Friday.     • cyanocobalamin (HM VITAMIN B12) 500 MCG tablet Take 500 mcg by mouth 2 times a day.     • sodium bicarbonate 325 MG Tab Take 2 Tabs by mouth 3 times a day.       No current facility-administered medications for this visit.          Allergies as of  "10/04/2017 - Reviewed 10/04/2017   Allergen Reaction Noted   • Cefdinir Shortness of Breath and Itching 03/01/2016   • Depakote [divalproex sodium] Unspecified 06/14/2010   • Abilify Unspecified 01/17/2013   • Amitriptyline Unspecified 10/31/2013   • Amoxicillin Rash 09/18/2010   • Ciprofloxacin Rash 12/17/2009   • Clindamycin Nausea 02/02/2011   • Ees [erythromycin] Vomiting and Nausea 08/28/2010   • Flagyl [metronidazole hcl] Unspecified 03/31/2011   • Flomax [tamsulosin hydrochloride] Swelling 09/24/2009   • Metformin Unspecified 07/23/2013   • Sulfa drugs Hives and Rash 09/18/2010   • Tape Rash 08/15/2012   • Vancomycin Itching 07/10/2016   • Cephalexin [keflex] Rash 01/01/2017   • Erythromycin Rash 03/30/2017   • Levofloxacin Unspecified 10/27/2016   • Metronidazole Rash 03/30/2017   • Valproic acid Rash 03/30/2017        ROS: As per HPI.    /80   Pulse 85   Temp 37.6 °C (99.6 °F)   Resp 16   Ht 1.651 m (5' 5\")   Wt 119.9 kg (264 lb 6.4 oz)   SpO2 95%   BMI 44.00 kg/m²     Physical Exam:  Gen:         Alert and oriented, No apparent distress.  Neck:        No Lymphadenopathy or Bruits.  Lungs:     Clear to auscultation bilaterally  CV:          Regular rate and rhythm. No murmurs, rubs or gallops.               Ext:          No clubbing, cyanosis, edema.      Assessment and Plan.   26 y.o. female with the following issues.    1. Essential hypertension  Currently well controlled, Discuss diet, exercise and salt restriction.    2. Type 2 diabetes mellitus without complication, without long-term current use of insulin (CMS-[facility])  Currently well controlled no changes. Discussed diet and exercise recheck A1c in 6 months. Reminded about yearly eye exam.  - POCT  A1C    3. Optic neuritis  Clinically stable follow with eye specialist.      "

## 2017-10-05 ENCOUNTER — OFFICE VISIT (OUTPATIENT)
Dept: INTERNAL MEDICINE | Facility: MEDICAL CENTER | Age: 27
End: 2017-10-05
Payer: MEDICARE

## 2017-10-05 VITALS
WEIGHT: 265.2 LBS | HEART RATE: 73 BPM | SYSTOLIC BLOOD PRESSURE: 120 MMHG | OXYGEN SATURATION: 94 % | TEMPERATURE: 96.5 F | BODY MASS INDEX: 44.18 KG/M2 | HEIGHT: 65 IN | DIASTOLIC BLOOD PRESSURE: 76 MMHG

## 2017-10-05 DIAGNOSIS — L73.9 ACUTE FOLLICULITIS: ICD-10-CM

## 2017-10-05 PROCEDURE — 99213 OFFICE O/P EST LOW 20 MIN: CPT | Mod: GE | Performed by: INTERNAL MEDICINE

## 2017-10-05 RX ORDER — CHLORHEXIDINE GLUCONATE 4 G/100ML
SOLUTION TOPICAL
Qty: 240 ML | Refills: 1 | Status: SHIPPED | OUTPATIENT
Start: 2017-10-05 | End: 2018-01-09

## 2017-10-05 NOTE — PROGRESS NOTES
"      Established Patient    Kristin presents today with the following:    CC: Torso/back rash x 1 week     HPI:     Ms. Balderrama is a 26 year old female with a complex medical history who presents to clinic for a rash. Rash has been present for approximately one week. Patient was seen at her PCP and was given a course of famciclovir for possible atypical zoster. Patient was unable to complete this course secondary to allergic type symptoms including SOB and tongue swelling. Patient states that rash has now spread to the her back and buttock. She denies itch but does note pain. There has been no open sores or drainage. She denies fever or chills but does state that she \"feels hot inside\". No one else in her home has this rash and she denies any recent illness. She was recently treated with steroids for optic neuritis. Her last dose was 5 days ago. She denies any change in her soap, detergents and has not been in any hot tubs or taking baths. She denies any new medication or any recent antibiotic use.     Patient Active Problem List    Diagnosis Date Noted   • Paraparesis of both lower limbs (CMS-[facility]) 01/24/2017     Priority: High   • Sinus tachycardia 10/31/2013     Priority: High   • Right leg weakness 08/04/2017     Priority: Medium   • Chronic inflammatory arthritis 05/23/2016     Priority: Medium   • Depression 10/28/2016     Priority: Low   • Schizophrenia (CMS-[facility]) 10/27/2016     Priority: Low   • Psychosis, schizophrenia, simple ([facility]) 09/29/2016     Priority: Low     Class: Chronic   • TONYA on CPAP 03/07/2016     Priority: Low   • Acquired hypothyroidism 11/23/2015     Priority: Low   • PCOS (polycystic ovarian syndrome) 11/23/2015     Priority: Low   • Progressive focal motor weakness 06/28/2015     Priority: Low   • Fatty liver disease, nonalcoholic 01/19/2015     Priority: Low   • Anxiety 12/16/2014     Priority: Low   • Knee pain, right 02/13/2014     Priority: Low   • Neurogenic bladder 04/02/2011     " Priority: Low   • Chronic UTI 09/18/2010     Priority: Low   • Borderline personality disorder in adult 09/18/2010     Priority: Low   • Urinary frequency 09/22/2017   • Intractable episodic cluster headache 09/14/2017   • Accidental overdose 08/04/2017   • Hypothyroidism 08/04/2017   • Rash 06/09/2017   • Hashimoto's encephalopathy 05/17/2017   • UTI (urinary tract infection) 05/13/2017   • Weakness 05/13/2017   • Enterococcus faecalis infection 05/13/2017   • Fall at home 05/13/2017   • Dysuria 05/09/2017   • Type 2 diabetes mellitus without complication, without long-term current use of insulin (CMS-HCC) 04/26/2017   • On home oxygen therapy 04/15/2017   • Chest pain 03/30/2017   • Weakness of right upper extremity 02/23/2017   • Leg weakness, bilateral 02/22/2017   • Chronic suprapubic catheter (CMS-HCC) 02/16/2017   • Weakness 01/22/2017   • Hypovitaminosis D 11/29/2016   • HTN (hypertension) 11/01/2016   • Chronic pain syndrome 10/27/2016   • Bowel and bladder incontinence 10/27/2016   • Galactorrhea 07/22/2016   • Elevated sedimentation rate 06/27/2016   • Weakness of both lower extremities 06/22/2016   • Vitamin D deficiency 05/21/2016   • Morbidly obese (CMS-HCC) 03/07/2016   • Scoliosis 03/07/2016   • GERD (gastroesophageal reflux disease) 03/07/2016   • Peripheral neuropathy (CMS-HCC) 03/06/2016   • H/O prior ablation treatment 02/10/2016       Current Outpatient Prescriptions   Medication Sig Dispense Refill   • chlorhexidine (HIBICLENS) 4 % liquid Rinse the area with water. Apply the minimum amount of HIBICLENS necessary to cover the skin area and wash gently. Rinse thoroughly 240 mL 1   • sumatriptan (IMITREX) 20 MG/ACT nasal spray Spray 1 Spray in nose as needed for Migraine. 10 Each 11   • neomycin-polymyxin-HC (PEDIOTIC HC) 3.5-19279-7 Suspension Place 4 Drops in ear 3 times a day. 1 Bottle 0   • silver sulfADIAZINE (SILVADENE) 1 % Cream Apply small amount to affected area daily until healed,  loosely cover with bandage 20 g 0   • fluoxetine (PROZAC) 10 MG Cap TAKE ONE CAPSULE BY MOUTH ONCE A DAY 30 Cap 2   • ziprasidone (GEODON) 80 MG Cap Take 1 Cap by mouth 2 Times a Day. 60 Cap 2   • fluticasone (FLONASE) 50 MCG/ACT nasal spray Spray 2 Sprays in nose every day. 16 g 11   • loratadine (CLARITIN) 10 MG Tab Take 1 Tab by mouth every day. 30 Tab    • oxycodone-acetaminophen (PERCOCET-10)  MG Tab Take 2 Tabs by mouth 2 Times a Day. Indications: Pain     • mupirocin (BACTROBAN) 2 % Ointment Apply 1 Application to affected area(s) every day. Pt started on 7/23/2017 for cellulitis of breast     • sitagliptin (JANUVIA) 100 MG Tab Take 1 Tab by mouth every day. 30 Tab 6   • GRALISE 600 MG Tab Take 600 mg by mouth 3 times a day.     • nitroGLYCERIN (NITROSTAT) 0.3 MG SL tablet PLACE 1 TABLET UNDER TONGUE IF NEEDED FOR CHEST PAIN EVERY 5 MINUTES FOR 3 DOSES AS DIRECTED  0   • Mirabegron ER (MYRBETRIQ) 25 MG TABLET SR 24 HR Take 1 Tab by mouth every day.     • lactobacillus granules (LACTINEX/FLORANEX) Pack Take 1 Packet by mouth 3 times a day, with meals.     • levothyroxine (SYNTHROID) 75 MCG Tab Take 75 mcg by mouth Every morning on an empty stomach.     • lactulose 10 GM/15ML Solution Take 10 g by mouth 2 times a day as needed (For constipation).     • furosemide (LASIX) 40 MG Tab Take 40 mg by mouth every day.     • promethazine (PHENERGAN) 25 MG Tab Take 1 Tab by mouth every 6 hours as needed for Nausea/Vomiting. 30 Tab 6   • aspirin EC (ECOTRIN) 81 MG Tablet Delayed Response Take 1 Tab by mouth every day. 30 Tab 6   • baclofen (LIORESAL) 10 MG Tab Take 1 Tab by mouth 3 times a day. 90 Tab 6   • ivabradine (CORLANOR) 5 MG Tab tablet Take 1 Tab by mouth 2 times a day, with meals. 60 Tab 6   • Melatonin 5 MG Tab Take 10 mg by mouth every bedtime.     • fentanyl (DURAGESIC) 25 MCG/HR PATCH 72 HR Apply 1 Patch to skin as directed every 72 hours.     • vitamin D, Ergocalciferol, (DRISDOL) 53916 UNITS Cap  "capsule Take 50,000 Units by mouth every Friday.     • cyanocobalamin (HM VITAMIN B12) 500 MCG tablet Take 500 mcg by mouth 2 times a day.     • sodium bicarbonate 325 MG Tab Take 2 Tabs by mouth 3 times a day.       No current facility-administered medications for this visit.        ROS: As per HPI. Additional pertinent symptoms as noted below.    Constitutional: denies fever, chills, weight loss  ENT: denies congestion, rhinorrhea, sore throat or cough  Cardiovascular: denies chest pain or palpitations   Respiratory: denies SOB, cough   GI: denies abdominal pain, nausea, diarrhea   : denies urinary frequency or urgency   Musculo-skeletal: denies new joint pain or swelling   Skin: + rash on upper chest, back and buttock  Neurological: denies numbness or tingling     /76   Pulse 73   Temp 35.8 °C (96.5 °F)   Ht 1.651 m (5' 5\")   Wt 120.3 kg (265 lb 3.2 oz)   SpO2 94%   BMI 44.13 kg/m²     Physical Exam   Constitutional:  Overweight female in No distress.   Eyes: Pupils are equal, round, and reactive to light. No scleral icterus.  Neck: Neck supple. No thyromegaly present.   Cardiovascular: Normal rate, regular rhythm and normal heart sounds.  Exam reveals no gallop and no friction rub.  No murmur heard.  Pulmonary/Chest: Breath sounds normal, no wheeze  Musculoskeletal:   no edema.   Neurological: alert and oriented to person, place, and time.   Skin: + comedone like papules present on bilateral breasts, upper back and buttock region. Open wound present on each present (2/2 spider bite per pt), no drainage, surrounding erythema or fluctuance. No tenderness to palpation of lesions      Assessment and Plan    1. Acute folliculitis  - pt presents with 1 week of spreading rash like lesions that appear to be comedones. Zoster less likely due to lack of dermatome pattern and appearance of lesions.   - due to patient allergies, cleocin and erythromycin gel could not be used   - started Hibiclens wash   - pt " to return to PCP if symptoms fail to improve or worsen       Followup: Return if symptoms worsen or fail to improve.      Signed by: Kerry Benz M.D.

## 2017-10-10 ENCOUNTER — TELEPHONE (OUTPATIENT)
Dept: BEHAVIORAL HEALTH | Facility: PHYSICIAN GROUP | Age: 27
End: 2017-10-10

## 2017-10-10 ENCOUNTER — OFFICE VISIT (OUTPATIENT)
Dept: MEDICAL GROUP | Facility: MEDICAL CENTER | Age: 27
End: 2017-10-10
Payer: MEDICARE

## 2017-10-10 ENCOUNTER — OFFICE VISIT (OUTPATIENT)
Dept: URGENT CARE | Facility: PHYSICIAN GROUP | Age: 27
End: 2017-10-10
Payer: MEDICARE

## 2017-10-10 VITALS
WEIGHT: 263 LBS | RESPIRATION RATE: 16 BRPM | SYSTOLIC BLOOD PRESSURE: 118 MMHG | BODY MASS INDEX: 43.82 KG/M2 | OXYGEN SATURATION: 95 % | TEMPERATURE: 98 F | HEART RATE: 85 BPM | HEIGHT: 65 IN | DIASTOLIC BLOOD PRESSURE: 80 MMHG

## 2017-10-10 VITALS
TEMPERATURE: 99.6 F | HEIGHT: 65 IN | SYSTOLIC BLOOD PRESSURE: 126 MMHG | RESPIRATION RATE: 20 BRPM | WEIGHT: 263 LBS | DIASTOLIC BLOOD PRESSURE: 80 MMHG | BODY MASS INDEX: 43.82 KG/M2 | HEART RATE: 90 BPM | OXYGEN SATURATION: 95 %

## 2017-10-10 DIAGNOSIS — R21 RASH OF ENTIRE BODY: ICD-10-CM

## 2017-10-10 DIAGNOSIS — R21 RASH: ICD-10-CM

## 2017-10-10 LAB
HETEROPH AB SER QL LA: NEGATIVE
INT CON NEG: NEGATIVE
INT CON NEG: NEGATIVE
INT CON POS: POSITIVE
INT CON POS: POSITIVE
S PYO AG THROAT QL: NEGATIVE

## 2017-10-10 PROCEDURE — 87880 STREP A ASSAY W/OPTIC: CPT | Performed by: PHYSICIAN ASSISTANT

## 2017-10-10 PROCEDURE — 99213 OFFICE O/P EST LOW 20 MIN: CPT | Performed by: INTERNAL MEDICINE

## 2017-10-10 PROCEDURE — 86308 HETEROPHILE ANTIBODY SCREEN: CPT | Performed by: PHYSICIAN ASSISTANT

## 2017-10-10 PROCEDURE — 99214 OFFICE O/P EST MOD 30 MIN: CPT | Performed by: PHYSICIAN ASSISTANT

## 2017-10-10 RX ORDER — HYDROXYZINE PAMOATE 25 MG/1
25 CAPSULE ORAL 3 TIMES DAILY PRN
Qty: 30 CAP | Refills: 0 | Status: SHIPPED | OUTPATIENT
Start: 2017-10-10 | End: 2017-10-23 | Stop reason: SDUPTHER

## 2017-10-10 ASSESSMENT — PAIN SCALES - GENERAL: PAINLEVEL: 8=MODERATE-SEVERE PAIN

## 2017-10-10 NOTE — ASSESSMENT & PLAN NOTE
This patient returns today for further evaluation of her rash. She was seen several days ago and told to start Hibiclens but that has not seemed to help. She reports that the rash started one or 2 weeks ago and seems to be slowly spreading from her upper chest to her shoulders. It is mildly pruritic. She received an antiviral that did not seem to help. She was started on Hibiclens which she reports also has not helped significantly. The rash seems to be slowly spreading. No one around her has a similar rash. She denies other constitutional symptoms.

## 2017-10-10 NOTE — PROGRESS NOTES
Subjective:     Chief Complaint   Patient presents with   • Rash     body rash x 1week     Kristin Balderrama is a 26 y.o. female here today forFurther evaluation of a rash.    Rash  This patient returns today for further evaluation of her rash. She was seen several days ago and told to start Hibiclens but that has not seemed to help. She reports that the rash started one or 2 weeks ago and seems to be slowly spreading from her upper chest to her shoulders. It is mildly pruritic. She received an antiviral that did not seem to help. She was started on Hibiclens which she reports also has not helped significantly. The rash seems to be slowly spreading. No one around her has a similar rash. She denies other constitutional symptoms.       The encounter diagnosis was Rash.    Allergies: Cefdinir; Depakote [divalproex sodium]; Abilify; Amitriptyline; Amoxicillin; Ciprofloxacin; Clindamycin; Ees [erythromycin]; Flagyl [metronidazole hcl]; Flomax [tamsulosin hydrochloride]; Metformin; Sulfa drugs; Tape; Vancomycin; Cephalexin [keflex]; Erythromycin; Levofloxacin; Metronidazole; and Valproic acid  Current medicines (including changes today)  Current Outpatient Prescriptions   Medication Sig Dispense Refill   • chlorhexidine (HIBICLENS) 4 % liquid Rinse the area with water. Apply the minimum amount of HIBICLENS necessary to cover the skin area and wash gently. Rinse thoroughly 240 mL 1   • sumatriptan (IMITREX) 20 MG/ACT nasal spray Spray 1 Spray in nose as needed for Migraine. 10 Each 11   • neomycin-polymyxin-HC (PEDIOTIC HC) 3.5-08435-9 Suspension Place 4 Drops in ear 3 times a day. 1 Bottle 0   • silver sulfADIAZINE (SILVADENE) 1 % Cream Apply small amount to affected area daily until healed, loosely cover with bandage 20 g 0   • fluoxetine (PROZAC) 10 MG Cap TAKE ONE CAPSULE BY MOUTH ONCE A DAY 30 Cap 2   • ziprasidone (GEODON) 80 MG Cap Take 1 Cap by mouth 2 Times a Day. 60 Cap 2   • fluticasone (FLONASE) 50 MCG/ACT  nasal spray Spray 2 Sprays in nose every day. 16 g 11   • loratadine (CLARITIN) 10 MG Tab Take 1 Tab by mouth every day. 30 Tab    • oxycodone-acetaminophen (PERCOCET-10)  MG Tab Take 2 Tabs by mouth 2 Times a Day. Indications: Pain     • mupirocin (BACTROBAN) 2 % Ointment Apply 1 Application to affected area(s) every day. Pt started on 7/23/2017 for cellulitis of breast     • sitagliptin (JANUVIA) 100 MG Tab Take 1 Tab by mouth every day. 30 Tab 6   • GRALISE 600 MG Tab Take 600 mg by mouth 3 times a day.     • nitroGLYCERIN (NITROSTAT) 0.3 MG SL tablet PLACE 1 TABLET UNDER TONGUE IF NEEDED FOR CHEST PAIN EVERY 5 MINUTES FOR 3 DOSES AS DIRECTED  0   • Mirabegron ER (MYRBETRIQ) 25 MG TABLET SR 24 HR Take 1 Tab by mouth every day.     • lactobacillus granules (LACTINEX/FLORANEX) Pack Take 1 Packet by mouth 3 times a day, with meals.     • levothyroxine (SYNTHROID) 75 MCG Tab Take 75 mcg by mouth Every morning on an empty stomach.     • lactulose 10 GM/15ML Solution Take 10 g by mouth 2 times a day as needed (For constipation).     • furosemide (LASIX) 40 MG Tab Take 40 mg by mouth every day.     • promethazine (PHENERGAN) 25 MG Tab Take 1 Tab by mouth every 6 hours as needed for Nausea/Vomiting. 30 Tab 6   • aspirin EC (ECOTRIN) 81 MG Tablet Delayed Response Take 1 Tab by mouth every day. 30 Tab 6   • baclofen (LIORESAL) 10 MG Tab Take 1 Tab by mouth 3 times a day. 90 Tab 6   • ivabradine (CORLANOR) 5 MG Tab tablet Take 1 Tab by mouth 2 times a day, with meals. 60 Tab 6   • Melatonin 5 MG Tab Take 10 mg by mouth every bedtime.     • fentanyl (DURAGESIC) 25 MCG/HR PATCH 72 HR Apply 1 Patch to skin as directed every 72 hours.     • vitamin D, Ergocalciferol, (DRISDOL) 57855 UNITS Cap capsule Take 50,000 Units by mouth every Friday.     • cyanocobalamin (HM VITAMIN B12) 500 MCG tablet Take 500 mcg by mouth 2 times a day.     • sodium bicarbonate 325 MG Tab Take 2 Tabs by mouth 3 times a day.       No current  "facility-administered medications for this visit.        She  has a past medical history of Anginal syndrome; Arrhythmia; Arthritis; ASTHMA; Borderline personality disorder; Breath shortness; Chronic UTI (9/18/2010); Disorder of thyroid; Fall; Gynecological disorder; Headache(784.0); Heart burn; History of falling; Hypertension; Migraine; 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CMS-HCC); Multiple personality disorder; Obesity; Pain (08-15-12); PCOS (polycystic ovarian syndrome); Pneumonia (2012); Psychosis; Renal disorder; Scoliosis; Sinus tachycardia (10/31/2013); Sleep apnea; Tuberculosis; Urinary bladder disorder; and Urinary incontinence.    ROS    Patient denies significant change in strength, weight or appetite.  No significant lightheadedness or headaches.  No change in vision, hearing, or swallowing.  No new dyspnea, coughing, chest pain, or palpitations.  No indigestion, abdominal pain, or change in bowel habits.  No change in urinating.  No new ankle swelling.  Major complaint is rash noted above that is mildly pruritic. No other new constitutional symptoms.       Objective:     PE:  /80   Pulse 90   Temp 37.6 °C (99.6 °F)   Resp 20   Ht 1.651 m (5' 5\")   Wt 119.3 kg (263 lb)   SpO2 95%   BMI 43.77 kg/m²    Neck is supple without significant lymphadenopathy or masses.  Lungs are clear with normal breath sounds without wheezes or rales .  Cardiovascular: peripheral circulation is satisfactory, heart sounds are unchanged and unremarkable.  Abdomen is soft, without masses or tenderness, with normal bowel sounds.  Extremities are without significant edema, cyanosis or deformity.  The rash is composed of scattered pustules was prominent on her upper chest and shoulders.      Assessment and Plan:   The following treatment plan was discussed  1. Rash      Continue Hibiclens, apply benzoyl peroxide twice a day. She will report any lack of improvement. Benadryl for itching.This does look like a folliculitis. she will " report back if she is not improving.        Followup:She will follow up regularly with Dr. Brody. She will report back if she is not improving.

## 2017-10-12 ASSESSMENT — ENCOUNTER SYMPTOMS
HEADACHES: 0
FALLS: 0
CHILLS: 0
SHORTNESS OF BREATH: 0
EYE REDNESS: 0
COUGH: 0
DIARRHEA: 0
VOMITING: 0
SORE THROAT: 0
ABDOMINAL PAIN: 0
DIZZINESS: 0
EYE DISCHARGE: 0
FEVER: 0
TINGLING: 0

## 2017-10-12 NOTE — PROGRESS NOTES
"Subjective:      Kristin Balderrama is a 26 y.o. female who presents with Rash (body rash ,itchy painful)            Pt is 25 y/o female who presents with worsening rash to her body for the lst 10 days. Pt. Has been evaluated in the recent past by 3 other providers- started on hiblcens wash and then recently started on benzoyl peroxide BID, and was to follow up with her PCP in 2 days for recheck. They suspect that rash is due to folliculitis at this time- however due to allergies pt. Must treat topically.   She reports significant itching and now some of the areas are painful. She denies any hx of new meds, detergents, she is not sexually active- rash spares her palms and soles of her feet.   She denies any hx of same and notes that others around her are not with the rash.         Review of Systems   Constitutional: Negative for chills and fever.   HENT: Negative for sore throat.    Eyes: Negative for discharge and redness.   Respiratory: Negative for cough and shortness of breath.    Cardiovascular: Negative for chest pain and leg swelling.   Gastrointestinal: Negative for abdominal pain, diarrhea and vomiting.   Genitourinary: Negative for dysuria and urgency.   Musculoskeletal: Negative for falls.   Skin: Positive for itching and rash.   Neurological: Negative for dizziness, tingling and headaches.          Objective:     /80   Pulse 85   Temp 36.7 °C (98 °F)   Resp 16   Ht 1.651 m (5' 5\")   Wt 119.3 kg (263 lb)   SpO2 95%   BMI 43.77 kg/m²    PMH:  has a past medical history of Anginal syndrome; Arrhythmia; Arthritis; ASTHMA; Borderline personality disorder; Breath shortness; Chronic UTI (9/18/2010); Disorder of thyroid; Fall; Gynecological disorder; Headache(784.0); Heart burn; History of falling; Hypertension; Migraine; 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CMS-[facility]); Multiple personality disorder; Obesity; Pain (08-15-12); PCOS (polycystic ovarian syndrome); Pneumonia (2012); Psychosis; Renal disorder; " Scoliosis; Sinus tachycardia (10/31/2013); Sleep apnea; Tuberculosis; Urinary bladder disorder; and Urinary incontinence.  MEDS:   Current Outpatient Prescriptions:   •  hydrOXYzine (VISTARIL) 25 MG Cap, Take 1 Cap by mouth 3 times a day as needed for Other (May cause sedation)., Disp: 30 Cap, Rfl: 0  •  sumatriptan (IMITREX) 20 MG/ACT nasal spray, Spray 1 Spray in nose as needed for Migraine., Disp: 10 Each, Rfl: 11  •  neomycin-polymyxin-HC (PEDIOTIC HC) 3.5-53477-4 Suspension, Place 4 Drops in ear 3 times a day., Disp: 1 Bottle, Rfl: 0  •  silver sulfADIAZINE (SILVADENE) 1 % Cream, Apply small amount to affected area daily until healed, loosely cover with bandage, Disp: 20 g, Rfl: 0  •  fluoxetine (PROZAC) 10 MG Cap, TAKE ONE CAPSULE BY MOUTH ONCE A DAY, Disp: 30 Cap, Rfl: 2  •  ziprasidone (GEODON) 80 MG Cap, Take 1 Cap by mouth 2 Times a Day., Disp: 60 Cap, Rfl: 2  •  fluticasone (FLONASE) 50 MCG/ACT nasal spray, Spray 2 Sprays in nose every day., Disp: 16 g, Rfl: 11  •  loratadine (CLARITIN) 10 MG Tab, Take 1 Tab by mouth every day., Disp: 30 Tab, Rfl:   •  oxycodone-acetaminophen (PERCOCET-10)  MG Tab, Take 2 Tabs by mouth 2 Times a Day. Indications: Pain, Disp: , Rfl:   •  mupirocin (BACTROBAN) 2 % Ointment, Apply 1 Application to affected area(s) every day. Pt started on 7/23/2017 for cellulitis of breast, Disp: , Rfl:   •  sitagliptin (JANUVIA) 100 MG Tab, Take 1 Tab by mouth every day., Disp: 30 Tab, Rfl: 6  •  GRALISE 600 MG Tab, Take 600 mg by mouth 3 times a day., Disp: , Rfl:   •  nitroGLYCERIN (NITROSTAT) 0.3 MG SL tablet, PLACE 1 TABLET UNDER TONGUE IF NEEDED FOR CHEST PAIN EVERY 5 MINUTES FOR 3 DOSES AS DIRECTED, Disp: , Rfl: 0  •  Mirabegron ER (MYRBETRIQ) 25 MG TABLET SR 24 HR, Take 1 Tab by mouth every day., Disp: , Rfl:   •  lactobacillus granules (LACTINEX/FLORANEX) Pack, Take 1 Packet by mouth 3 times a day, with meals., Disp: , Rfl:   •  levothyroxine (SYNTHROID) 75 MCG Tab, Take 75  "mcg by mouth Every morning on an empty stomach., Disp: , Rfl:   •  lactulose 10 GM/15ML Solution, Take 10 g by mouth 2 times a day as needed (For constipation)., Disp: , Rfl:   •  furosemide (LASIX) 40 MG Tab, Take 40 mg by mouth every day., Disp: , Rfl:   •  promethazine (PHENERGAN) 25 MG Tab, Take 1 Tab by mouth every 6 hours as needed for Nausea/Vomiting., Disp: 30 Tab, Rfl: 6  •  aspirin EC (ECOTRIN) 81 MG Tablet Delayed Response, Take 1 Tab by mouth every day., Disp: 30 Tab, Rfl: 6  •  baclofen (LIORESAL) 10 MG Tab, Take 1 Tab by mouth 3 times a day., Disp: 90 Tab, Rfl: 6  •  ivabradine (CORLANOR) 5 MG Tab tablet, Take 1 Tab by mouth 2 times a day, with meals., Disp: 60 Tab, Rfl: 6  •  Melatonin 5 MG Tab, Take 10 mg by mouth every bedtime., Disp: , Rfl:   •  fentanyl (DURAGESIC) 25 MCG/HR PATCH 72 HR, Apply 1 Patch to skin as directed every 72 hours., Disp: , Rfl:   •  vitamin D, Ergocalciferol, (DRISDOL) 06977 UNITS Cap capsule, Take 50,000 Units by mouth every Friday., Disp: , Rfl:   •  cyanocobalamin (HM VITAMIN B12) 500 MCG tablet, Take 500 mcg by mouth 2 times a day., Disp: , Rfl:   •  sodium bicarbonate 325 MG Tab, Take 2 Tabs by mouth 3 times a day., Disp: , Rfl:   •  chlorhexidine (HIBICLENS) 4 % liquid, Rinse the area with water. Apply the minimum amount of HIBICLENS necessary to cover the skin area and wash gently. Rinse thoroughly, Disp: 240 mL, Rfl: 1  ALLERGIES:   Allergies   Allergen Reactions   • Cefdinir Shortness of Breath and Itching     Tolerated 1/18/17   • Depakote [Divalproex Sodium] Unspecified     Muscle spasms/muscle pain and weakness     • Abilify Unspecified     Headaches/muscle twitching     • Amitriptyline Unspecified     Headaches     • Amoxicillin Rash     Pt states \"I get a rash\".     • Ciprofloxacin Rash     Pt states \"I get a rash\".     • Clindamycin Nausea     Even with food     • Ees [Erythromycin] Vomiting and Nausea   • Flagyl [Metronidazole Hcl] Unspecified     \"eye " "problems\"     • Flomax [Tamsulosin Hydrochloride] Swelling   • Metformin Unspecified     Increased lactic acid      • Sulfa Drugs Hives and Rash     RXN=since childhood   • Tape Rash     Tears skin off   coban with Tegaderm tape ok  RXN=ongoing   • Vancomycin Itching     Pt becomes flushed in face and chest.   RXN=7/10/16   • Cephalexin [Keflex] Rash     Pt states she gets a rash with this medication   • Erythromycin Rash     .   • Levofloxacin Unspecified     Leg muscle cramps   • Metronidazole Rash     .   • Valproic Acid Rash     .     SURGHX:   Past Surgical History:   Procedure Laterality Date   • MUSCLE BIOPSY Right 1/26/2017    Procedure: MUSCLE BIOPSY - THIGH;  Surgeon: Isidro Vigil M.D.;  Location: SURGERY [facility];  Service:    • GASTROSCOPY WITH BALLOON DILATATION N/A 1/4/2017    Procedure: GASTROSCOPY WITH DILATATION;  Surgeon: Torres Vargas M.D.;  Location: [facility];  Service:    • BOWEL STIMULATOR INSERTION  7/13/2016    Procedure: BOWEL STIMULATOR INSERTION FOR PERMANENT INTERSTIM SACRAL IMPLANT;  Surgeon: Joe Noyola M.D.;  Location: SURGERY [facility];  Service:    • RECOVERY  1/27/2016    Procedure: CATH LAB EP STUDY WITH SINUS NODE MODIFICATION ABHINAV;  Surgeon: Recoveryonly Surgery;  Location: SURGERY PRE-POST PROC UNIT [facility];  Service:    • OTHER CARDIAC SURGERY  1/2016    cardiac ablation   • NEURO DEST FACET L/S W/IG SNGL  4/21/2015    Performed by Reza Tabor at [facility]   • LUMBAR FUSION ANTERIOR  8/21/2012    Performed by SUSIE SAWANT at [facility]   • ALYSSA BY LAPAROSCOPY  8/29/2010    Performed by SHAYY JOHNS at [facility]   • OTHER ABDOMINAL SURGERY     • TONSILLECTOMY      tonsillectomy     SOCHX:  reports that she has never smoked. She has never used smokeless tobacco. She reports that she does not drink alcohol or use drugs.  FH: Family history was reviewed, no pertinent findings to report    Physical " Exam   Constitutional: She is oriented to person, place, and time. She appears well-developed and well-nourished. No distress.   HENT:   Head: Normocephalic and atraumatic.   Mouth/Throat: No oropharyngeal exudate.   Eyes: Conjunctivae and EOM are normal. Pupils are equal, round, and reactive to light.   Neck: Normal range of motion. Neck supple. No tracheal deviation present.   Cardiovascular: Normal rate and regular rhythm.    No murmur heard.  Pulmonary/Chest: Effort normal and breath sounds normal. No respiratory distress.   Musculoskeletal: Normal range of motion. She exhibits no edema.   Neurological: She is alert and oriented to person, place, and time. Coordination normal.   Skin: Rash noted.   Scattered erythematous pinpoint/pustule like lesions- some pruritic in nature. Over all upper ext, trunk, and back. Spares the hands and feet, and face.    Psychiatric: She has a normal mood and affect. Her behavior is normal. Judgment and thought content normal.   Vitals reviewed.            POC mono and strep- negative.     Assessment/Plan:     1. Rash of entire body  - POCT Rapid Strep A  - POCT Mononucleosis (mono)  - hydrOXYzine (VISTARIL) 25 MG Cap; Take 1 Cap by mouth 3 times a day as needed for Other (May cause sedation).  Dispense: 30 Cap; Refill: 0    Negative labs- due to the diffuse nature of the rash guttate was considered.   I agree with prior providers rash appear more like a folliculitis and wash/scrubs the patient currently has should improve symptoms- however pt. Is itching and picky at several sites. Will trial hydroxyzine , and she was given polysporin to start on an area on her breast. She Is to have recheck in 2 days with PCP. She declined a referral to derm at this time.   Patient given precautionary s/sx that mandate immediate follow up and evaluation in the ED. Advised of risks of not doing so.    DDX, Supportive care, and indications for immediate follow-up discussed with patient.     Instructed to return to clinic or nearest emergency department if we are not available for any change in condition, further concerns, or worsening of symptoms.    The patient demonstrated a good understanding and agreed with the treatment plan.

## 2017-10-17 ENCOUNTER — HOSPITAL ENCOUNTER (OUTPATIENT)
Dept: LAB | Facility: MEDICAL CENTER | Age: 27
End: 2017-10-17
Attending: INTERNAL MEDICINE
Payer: MEDICARE

## 2017-10-17 ENCOUNTER — OFFICE VISIT (OUTPATIENT)
Dept: MEDICAL GROUP | Facility: MEDICAL CENTER | Age: 27
End: 2017-10-17
Payer: MEDICARE

## 2017-10-17 ENCOUNTER — HOSPITAL ENCOUNTER (OUTPATIENT)
Dept: LAB | Facility: MEDICAL CENTER | Age: 27
End: 2017-10-17
Attending: PSYCHIATRY & NEUROLOGY
Payer: MEDICARE

## 2017-10-17 VITALS
HEIGHT: 65 IN | HEART RATE: 82 BPM | BODY MASS INDEX: 45.58 KG/M2 | DIASTOLIC BLOOD PRESSURE: 70 MMHG | OXYGEN SATURATION: 97 % | WEIGHT: 273.6 LBS | RESPIRATION RATE: 16 BRPM | TEMPERATURE: 99.7 F | SYSTOLIC BLOOD PRESSURE: 104 MMHG

## 2017-10-17 DIAGNOSIS — I10 ESSENTIAL HYPERTENSION: Chronic | ICD-10-CM

## 2017-10-17 DIAGNOSIS — R25.2 LEG CRAMPS: ICD-10-CM

## 2017-10-17 DIAGNOSIS — R21 RASH: ICD-10-CM

## 2017-10-17 LAB
ANION GAP SERPL CALC-SCNC: 12 MMOL/L (ref 0–11.9)
BUN SERPL-MCNC: 19 MG/DL (ref 8–22)
CALCIUM SERPL-MCNC: 9.9 MG/DL (ref 8.5–10.5)
CHLORIDE SERPL-SCNC: 104 MMOL/L (ref 96–112)
CO2 SERPL-SCNC: 22 MMOL/L (ref 20–33)
CREAT SERPL-MCNC: 0.73 MG/DL (ref 0.5–1.4)
FOLATE SERPL-MCNC: 19.9 NG/ML
GFR SERPL CREATININE-BSD FRML MDRD: >60 ML/MIN/1.73 M 2
GLUCOSE SERPL-MCNC: 109 MG/DL (ref 65–99)
POTASSIUM SERPL-SCNC: 4 MMOL/L (ref 3.6–5.5)
SODIUM SERPL-SCNC: 138 MMOL/L (ref 135–145)
VIT B12 SERPL-MCNC: >1500 PG/ML (ref 211–911)

## 2017-10-17 PROCEDURE — 99214 OFFICE O/P EST MOD 30 MIN: CPT | Performed by: INTERNAL MEDICINE

## 2017-10-17 PROCEDURE — 80048 BASIC METABOLIC PNL TOTAL CA: CPT

## 2017-10-17 PROCEDURE — 82164 ANGIOTENSIN I ENZYME TEST: CPT

## 2017-10-17 PROCEDURE — 82607 VITAMIN B-12: CPT

## 2017-10-17 PROCEDURE — 82746 ASSAY OF FOLIC ACID SERUM: CPT

## 2017-10-17 PROCEDURE — 36415 COLL VENOUS BLD VENIPUNCTURE: CPT

## 2017-10-17 RX ORDER — CLINDAMYCIN PHOSPHATE 10 UG/ML
1 LOTION TOPICAL 2 TIMES DAILY
Qty: 1 BOTTLE | Refills: 1 | Status: SHIPPED | OUTPATIENT
Start: 2017-10-17 | End: 2017-10-31

## 2017-10-17 NOTE — PROGRESS NOTES
CC: Follow-up multiple issues    HPI:   Kristin presents today with the following.    1. Rash  Presents complaining of persistent rash. She reports is been improved was diffuse erythematous is now mostly pinpoint on neck and chin. More small bumps than anything. She is taking hydroxyzine for itching. Denies fevers or chills.    2. Leg cramps  She is reporting cramping of the legs bilaterally usually at night. She had blood work this morning and reports they can add on electrolytes which she likes to do.    3. Essential hypertension  Blood pressure currently well controlled denying any chest pain or shortness of breath no edema.      Patient Active Problem List    Diagnosis Date Noted   • Paraparesis of both lower limbs (CMS-HCC) 01/24/2017     Priority: High   • Sinus tachycardia 10/31/2013     Priority: High   • Right leg weakness 08/04/2017     Priority: Medium   • Chronic inflammatory arthritis 05/23/2016     Priority: Medium   • Depression 10/28/2016     Priority: Low   • Schizophrenia (CMS-HCC) 10/27/2016     Priority: Low   • Psychosis, schizophrenia, simple ([facility]) 09/29/2016     Priority: Low     Class: Chronic   • TONYA on CPAP 03/07/2016     Priority: Low   • Acquired hypothyroidism 11/23/2015     Priority: Low   • PCOS (polycystic ovarian syndrome) 11/23/2015     Priority: Low   • Progressive focal motor weakness 06/28/2015     Priority: Low   • Fatty liver disease, nonalcoholic 01/19/2015     Priority: Low   • Anxiety 12/16/2014     Priority: Low   • Knee pain, right 02/13/2014     Priority: Low   • Neurogenic bladder 04/02/2011     Priority: Low   • Chronic UTI 09/18/2010     Priority: Low   • Borderline personality disorder in adult 09/18/2010     Priority: Low   • Urinary frequency 09/22/2017   • Intractable episodic cluster headache 09/14/2017   • Accidental overdose 08/04/2017   • Hypothyroidism 08/04/2017   • Rash 06/09/2017   • Hashimoto's encephalopathy 05/17/2017   • UTI (urinary tract infection)  05/13/2017   • Weakness 05/13/2017   • Enterococcus faecalis infection 05/13/2017   • Fall at home 05/13/2017   • Dysuria 05/09/2017   • Type 2 diabetes mellitus without complication, without long-term current use of insulin (CMS-HCC) 04/26/2017   • On home oxygen therapy 04/15/2017   • Chest pain 03/30/2017   • Weakness of right upper extremity 02/23/2017   • Leg weakness, bilateral 02/22/2017   • Chronic suprapubic catheter (CMS-HCC) 02/16/2017   • Weakness 01/22/2017   • Hypovitaminosis D 11/29/2016   • HTN (hypertension) 11/01/2016   • Chronic pain syndrome 10/27/2016   • Bowel and bladder incontinence 10/27/2016   • Galactorrhea 07/22/2016   • Elevated sedimentation rate 06/27/2016   • Weakness of both lower extremities 06/22/2016   • Vitamin D deficiency 05/21/2016   • Morbidly obese (CMS-HCC) 03/07/2016   • Scoliosis 03/07/2016   • GERD (gastroesophageal reflux disease) 03/07/2016   • Peripheral neuropathy (CMS-HCC) 03/06/2016   • H/O prior ablation treatment 02/10/2016       Current Outpatient Prescriptions   Medication Sig Dispense Refill   • CLINDAMYCIN PHOSPHATE,TOPICAL, (CLINDAMAX) 1 % Lotion Apply 1 g to affected area(s) 2 times a day. 1 Bottle 1   • hydrOXYzine (VISTARIL) 25 MG Cap Take 1 Cap by mouth 3 times a day as needed for Other (May cause sedation). 30 Cap 0   • chlorhexidine (HIBICLENS) 4 % liquid Rinse the area with water. Apply the minimum amount of HIBICLENS necessary to cover the skin area and wash gently. Rinse thoroughly 240 mL 1   • sumatriptan (IMITREX) 20 MG/ACT nasal spray Spray 1 Spray in nose as needed for Migraine. 10 Each 11   • neomycin-polymyxin-HC (PEDIOTIC HC) 3.5-04503-9 Suspension Place 4 Drops in ear 3 times a day. 1 Bottle 0   • silver sulfADIAZINE (SILVADENE) 1 % Cream Apply small amount to affected area daily until healed, loosely cover with bandage 20 g 0   • fluoxetine (PROZAC) 10 MG Cap TAKE ONE CAPSULE BY MOUTH ONCE A DAY 30 Cap 2   • ziprasidone (GEODON) 80 MG Cap  Take 1 Cap by mouth 2 Times a Day. 60 Cap 2   • fluticasone (FLONASE) 50 MCG/ACT nasal spray Spray 2 Sprays in nose every day. 16 g 11   • loratadine (CLARITIN) 10 MG Tab Take 1 Tab by mouth every day. 30 Tab    • oxycodone-acetaminophen (PERCOCET-10)  MG Tab Take 2 Tabs by mouth 2 Times a Day. Indications: Pain     • mupirocin (BACTROBAN) 2 % Ointment Apply 1 Application to affected area(s) every day. Pt started on 7/23/2017 for cellulitis of breast     • sitagliptin (JANUVIA) 100 MG Tab Take 1 Tab by mouth every day. 30 Tab 6   • GRALISE 600 MG Tab Take 600 mg by mouth 3 times a day.     • nitroGLYCERIN (NITROSTAT) 0.3 MG SL tablet PLACE 1 TABLET UNDER TONGUE IF NEEDED FOR CHEST PAIN EVERY 5 MINUTES FOR 3 DOSES AS DIRECTED  0   • Mirabegron ER (MYRBETRIQ) 25 MG TABLET SR 24 HR Take 1 Tab by mouth every day.     • lactobacillus granules (LACTINEX/FLORANEX) Pack Take 1 Packet by mouth 3 times a day, with meals.     • levothyroxine (SYNTHROID) 75 MCG Tab Take 75 mcg by mouth Every morning on an empty stomach.     • lactulose 10 GM/15ML Solution Take 10 g by mouth 2 times a day as needed (For constipation).     • furosemide (LASIX) 40 MG Tab Take 40 mg by mouth every day.     • promethazine (PHENERGAN) 25 MG Tab Take 1 Tab by mouth every 6 hours as needed for Nausea/Vomiting. 30 Tab 6   • aspirin EC (ECOTRIN) 81 MG Tablet Delayed Response Take 1 Tab by mouth every day. 30 Tab 6   • baclofen (LIORESAL) 10 MG Tab Take 1 Tab by mouth 3 times a day. 90 Tab 6   • ivabradine (CORLANOR) 5 MG Tab tablet Take 1 Tab by mouth 2 times a day, with meals. 60 Tab 6   • Melatonin 5 MG Tab Take 10 mg by mouth every bedtime.     • fentanyl (DURAGESIC) 25 MCG/HR PATCH 72 HR Apply 1 Patch to skin as directed every 72 hours.     • vitamin D, Ergocalciferol, (DRISDOL) 56226 UNITS Cap capsule Take 50,000 Units by mouth every Friday.     • cyanocobalamin (HM VITAMIN B12) 500 MCG tablet Take 500 mcg by mouth 2 times a day.     • sodium  "bicarbonate 325 MG Tab Take 2 Tabs by mouth 3 times a day.       No current facility-administered medications for this visit.          Allergies as of 10/17/2017 - Reviewed 10/17/2017   Allergen Reaction Noted   • Cefdinir Shortness of Breath and Itching 03/01/2016   • Depakote [divalproex sodium] Unspecified 06/14/2010   • Abilify Unspecified 01/17/2013   • Amitriptyline Unspecified 10/31/2013   • Amoxicillin Rash 09/18/2010   • Ciprofloxacin Rash 12/17/2009   • Clindamycin Nausea 02/02/2011   • Ees [erythromycin] Vomiting and Nausea 08/28/2010   • Flagyl [metronidazole hcl] Unspecified 03/31/2011   • Flomax [tamsulosin hydrochloride] Swelling 09/24/2009   • Metformin Unspecified 07/23/2013   • Sulfa drugs Hives and Rash 09/18/2010   • Tape Rash 08/15/2012   • Vancomycin Itching 07/10/2016   • Cephalexin [keflex] Rash 01/01/2017   • Erythromycin Rash 03/30/2017   • Levofloxacin Unspecified 10/27/2016   • Metronidazole Rash 03/30/2017   • Valproic acid Rash 03/30/2017        ROS: As per HPI.    /70   Pulse 82   Temp 37.6 °C (99.7 °F)   Resp 16   Ht 1.651 m (5' 5\")   Wt 124.1 kg (273 lb 9.6 oz)   SpO2 97%   BMI 45.53 kg/m²      Physical Exam:  Gen:         Alert and oriented, No apparent distress.  Neck:        No Lymphadenopathy or Bruits.  Lungs:     Clear to auscultation bilaterally  CV:          Regular rate and rhythm. No murmurs, rubs or gallops.               Ext:          No clubbing, cyanosis, edema.      Assessment and Plan.   27 y.o. female with the following issues.    1. Rash  Refilled lotions she will follow up with dermatology which she can get into quickly if rash should recur.  - CLINDAMYCIN PHOSPHATE,TOPICAL, (CLINDAMAX) 1 % Lotion; Apply 1 g to affected area(s) 2 times a day.  Dispense: 1 Bottle; Refill: 1    2. Leg cramps  Checking electrolytes  - BASIC METABOLIC PANEL; Future    3. Essential hypertension  Currently well controlled, Discuss diet, exercise and salt restriction.      "

## 2017-10-19 LAB — ACE SERPL-CCNC: 20 U/L (ref 9–67)

## 2017-10-21 LAB — EKG IMPRESSION: NORMAL

## 2017-10-23 DIAGNOSIS — R21 RASH OF ENTIRE BODY: ICD-10-CM

## 2017-10-23 RX ORDER — HYDROXYZINE PAMOATE 25 MG/1
25 CAPSULE ORAL 3 TIMES DAILY PRN
Qty: 30 CAP | Refills: 3 | Status: SHIPPED | OUTPATIENT
Start: 2017-10-23 | End: 2017-11-30

## 2017-10-23 NOTE — TELEPHONE ENCOUNTER
Was the patient seen in the last year in this department? Yes     Does patient have an active prescription for medications requested? No     Received Request Via: Patient     Patient states she is still having itching and is requesting a refill

## 2017-10-24 ENCOUNTER — OFFICE VISIT (OUTPATIENT)
Dept: MEDICAL GROUP | Facility: MEDICAL CENTER | Age: 27
End: 2017-10-24
Payer: MEDICARE

## 2017-10-24 ENCOUNTER — TELEPHONE (OUTPATIENT)
Dept: CARDIOLOGY | Facility: MEDICAL CENTER | Age: 27
End: 2017-10-24

## 2017-10-24 ENCOUNTER — APPOINTMENT (OUTPATIENT)
Dept: WOUND CARE | Facility: MEDICAL CENTER | Age: 27
End: 2017-10-24
Payer: MEDICARE

## 2017-10-24 VITALS
WEIGHT: 275 LBS | DIASTOLIC BLOOD PRESSURE: 82 MMHG | HEIGHT: 65 IN | SYSTOLIC BLOOD PRESSURE: 124 MMHG | RESPIRATION RATE: 14 BRPM | TEMPERATURE: 98.6 F | OXYGEN SATURATION: 96 % | HEART RATE: 80 BPM | BODY MASS INDEX: 45.82 KG/M2

## 2017-10-24 DIAGNOSIS — L98.491 NONHEALING SKIN ULCER, LIMITED TO BREAKDOWN OF SKIN (HCC): ICD-10-CM

## 2017-10-24 DIAGNOSIS — E66.01 MORBID OBESITY WITH BMI OF 45.0-49.9, ADULT (HCC): ICD-10-CM

## 2017-10-24 DIAGNOSIS — L98.8 SKIN LESION OF BREAST: ICD-10-CM

## 2017-10-24 PROBLEM — G82.20 PARAPARESIS OF BOTH LOWER LIMBS (HCC): Status: RESOLVED | Noted: 2017-01-24 | Resolved: 2017-10-24

## 2017-10-24 PROBLEM — R30.0 DYSURIA: Status: RESOLVED | Noted: 2017-05-09 | Resolved: 2017-10-24

## 2017-10-24 PROBLEM — R53.1 WEAKNESS: Status: RESOLVED | Noted: 2017-01-22 | Resolved: 2017-10-24

## 2017-10-24 PROBLEM — R53.1 WEAKNESS: Status: RESOLVED | Noted: 2017-05-13 | Resolved: 2017-10-24

## 2017-10-24 PROBLEM — R29.898 LEG WEAKNESS, BILATERAL: Status: RESOLVED | Noted: 2017-02-22 | Resolved: 2017-10-24

## 2017-10-24 PROBLEM — E03.9 HYPOTHYROIDISM: Chronic | Status: RESOLVED | Noted: 2017-08-04 | Resolved: 2017-10-24

## 2017-10-24 PROBLEM — R29.898 RIGHT LEG WEAKNESS: Status: RESOLVED | Noted: 2017-08-04 | Resolved: 2017-10-24

## 2017-10-24 PROBLEM — N39.0 UTI (URINARY TRACT INFECTION): Status: RESOLVED | Noted: 2017-05-13 | Resolved: 2017-10-24

## 2017-10-24 PROBLEM — A49.8 ENTEROCOCCUS FAECALIS INFECTION: Status: RESOLVED | Noted: 2017-05-13 | Resolved: 2017-10-24

## 2017-10-24 PROBLEM — R35.0 URINARY FREQUENCY: Status: RESOLVED | Noted: 2017-09-22 | Resolved: 2017-10-24

## 2017-10-24 PROCEDURE — 99213 OFFICE O/P EST LOW 20 MIN: CPT | Performed by: FAMILY MEDICINE

## 2017-10-24 NOTE — PROGRESS NOTES
cc:  Skin lesions    Subjective:     Kristin Balderrama is a 27 y.o. female presentingWith skin lesions. She developed a skin lesion on her right breast about 6 months ago after spider bite. Has been slowly healing. Still somewhat red. However, she developed a new lesion on her left breast several weeks ago. Is quite painful, red, seems to be growing and is not healing, continues to bleed and ooze. Has been using Bactroban, Polysporin, Neosporin over this with no improvement. Denies any new tight clothes, detergents, soaps, lotions, injuries. Has been having subjective fevers, she reports that her normal temperature is 96 and a temperature of 99 is a fever for her.    Review of systems:  See above.       Current Outpatient Prescriptions:   •  hydrOXYzine pamoate (VISTARIL) 25 MG Cap, Take 1 Cap by mouth 3 times a day as needed for Other (May cause sedation)., Disp: 30 Cap, Rfl: 3  •  CLINDAMYCIN PHOSPHATE,TOPICAL, (CLINDAMAX) 1 % Lotion, Apply 1 g to affected area(s) 2 times a day., Disp: 1 Bottle, Rfl: 1  •  chlorhexidine (HIBICLENS) 4 % liquid, Rinse the area with water. Apply the minimum amount of HIBICLENS necessary to cover the skin area and wash gently. Rinse thoroughly, Disp: 240 mL, Rfl: 1  •  sumatriptan (IMITREX) 20 MG/ACT nasal spray, Spray 1 Spray in nose as needed for Migraine., Disp: 10 Each, Rfl: 11  •  neomycin-polymyxin-HC (PEDIOTIC HC) 3.5-77698-7 Suspension, Place 4 Drops in ear 3 times a day., Disp: 1 Bottle, Rfl: 0  •  silver sulfADIAZINE (SILVADENE) 1 % Cream, Apply small amount to affected area daily until healed, loosely cover with bandage, Disp: 20 g, Rfl: 0  •  fluoxetine (PROZAC) 10 MG Cap, TAKE ONE CAPSULE BY MOUTH ONCE A DAY, Disp: 30 Cap, Rfl: 2  •  ziprasidone (GEODON) 80 MG Cap, Take 1 Cap by mouth 2 Times a Day., Disp: 60 Cap, Rfl: 2  •  fluticasone (FLONASE) 50 MCG/ACT nasal spray, Spray 2 Sprays in nose every day., Disp: 16 g, Rfl: 11  •  loratadine (CLARITIN) 10 MG Tab, Take 1  Tab by mouth every day., Disp: 30 Tab, Rfl:   •  oxycodone-acetaminophen (PERCOCET-10)  MG Tab, Take 2 Tabs by mouth 2 Times a Day. Indications: Pain, Disp: , Rfl:   •  mupirocin (BACTROBAN) 2 % Ointment, Apply 1 Application to affected area(s) every day. Pt started on 7/23/2017 for cellulitis of breast, Disp: , Rfl:   •  sitagliptin (JANUVIA) 100 MG Tab, Take 1 Tab by mouth every day., Disp: 30 Tab, Rfl: 6  •  GRALISE 600 MG Tab, Take 600 mg by mouth 3 times a day., Disp: , Rfl:   •  nitroGLYCERIN (NITROSTAT) 0.3 MG SL tablet, PLACE 1 TABLET UNDER TONGUE IF NEEDED FOR CHEST PAIN EVERY 5 MINUTES FOR 3 DOSES AS DIRECTED, Disp: , Rfl: 0  •  Mirabegron ER (MYRBETRIQ) 25 MG TABLET SR 24 HR, Take 1 Tab by mouth every day., Disp: , Rfl:   •  lactobacillus granules (LACTINEX/FLORANEX) Pack, Take 1 Packet by mouth 3 times a day, with meals., Disp: , Rfl:   •  levothyroxine (SYNTHROID) 75 MCG Tab, Take 75 mcg by mouth Every morning on an empty stomach., Disp: , Rfl:   •  lactulose 10 GM/15ML Solution, Take 10 g by mouth 2 times a day as needed (For constipation)., Disp: , Rfl:   •  furosemide (LASIX) 40 MG Tab, Take 40 mg by mouth every day., Disp: , Rfl:   •  promethazine (PHENERGAN) 25 MG Tab, Take 1 Tab by mouth every 6 hours as needed for Nausea/Vomiting., Disp: 30 Tab, Rfl: 6  •  aspirin EC (ECOTRIN) 81 MG Tablet Delayed Response, Take 1 Tab by mouth every day., Disp: 30 Tab, Rfl: 6  •  baclofen (LIORESAL) 10 MG Tab, Take 1 Tab by mouth 3 times a day., Disp: 90 Tab, Rfl: 6  •  ivabradine (CORLANOR) 5 MG Tab tablet, Take 1 Tab by mouth 2 times a day, with meals., Disp: 60 Tab, Rfl: 6  •  Melatonin 5 MG Tab, Take 10 mg by mouth every bedtime., Disp: , Rfl:   •  fentanyl (DURAGESIC) 25 MCG/HR PATCH 72 HR, Apply 1 Patch to skin as directed every 72 hours., Disp: , Rfl:   •  vitamin D, Ergocalciferol, (DRISDOL) 13657 UNITS Cap capsule, Take 50,000 Units by mouth every Friday., Disp: , Rfl:   •  cyanocobalamin (HM  "VITAMIN B12) 500 MCG tablet, Take 500 mcg by mouth 2 times a day., Disp: , Rfl:   •  sodium bicarbonate 325 MG Tab, Take 2 Tabs by mouth 3 times a day., Disp: , Rfl:     Allergies   Allergen Reactions   • Cefdinir Shortness of Breath and Itching     Tolerated 1/18/17   • Depakote [Divalproex Sodium] Unspecified     Muscle spasms/muscle pain and weakness     • Abilify Unspecified     Headaches/muscle twitching     • Amitriptyline Unspecified     Headaches     • Amoxicillin Rash     Pt states \"I get a rash\".     • Ciprofloxacin Rash     Pt states \"I get a rash\".     • Clindamycin Nausea     Even with food     • Ees [Erythromycin] Vomiting and Nausea   • Flagyl [Metronidazole Hcl] Unspecified     \"eye problems\"     • Flomax [Tamsulosin Hydrochloride] Swelling   • Metformin Unspecified     Increased lactic acid      • Sulfa Drugs Hives and Rash     RXN=since childhood   • Tape Rash     Tears skin off   coban with Tegaderm tape ok  RXN=ongoing   • Vancomycin Itching     Pt becomes flushed in face and chest.   RXN=7/10/16   • Cephalexin [Keflex] Rash     Pt states she gets a rash with this medication   • Erythromycin Rash     .   • Levofloxacin Unspecified     Leg muscle cramps   • Metronidazole Rash     .   • Valproic Acid Rash     .       Past Medical History:   Diagnosis Date   • Anginal syndrome     random chest pain especially with tachycardia   • Arrhythmia     \"sinus tachycardia\", cariologist, Dr. Kumar; ablation 2/2016   • Arthritis     osteo   • ASTHMA     when around smoke   • Borderline personality disorder    • Breath shortness     with tachycardia   • Chronic UTI 9/18/2010   • Disorder of thyroid    • Fall    • Gynecological disorder     PCOS   • Headache(784.0)    • Heart burn    • History of falling    • Hypertension    • Migraine    • 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CMS-HCC)    • Multiple personality disorder    • Obesity    • Pain 08-15-12    back, 7/10   • PCOS (polycystic ovarian syndrome)    • Pneumonia 2012 " "  • Psychosis    • Renal disorder     \"kidney disease, stage 1\" nephrologist, Dr. 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   • Urinary bladder disorder     Suprapubic cath   • Urinary incontinence      Past Surgical History:   Procedure Laterality Date   • MUSCLE BIOPSY Right 1/26/2017    Procedure: MUSCLE BIOPSY - THIGH;  Surgeon: Isidro Vigil M.D.;  Location: SUR[facility]ERY [facility];  Service:    • GASTROSCOPY WITH BALLOON DILATATION N/A 1/4/2017    Procedure: GASTROSCOPY WITH DILATATION;  Surgeon: Torres Vargas M.D.;  Location: SURGERY [facility];  Service:    • BOWEL STIMULATOR INSERTION  7/13/2016    Procedure: BOWEL STIMULATOR INSERTION FOR PERMANENT INTERSTIM SACRAL IMPLANT;  Surgeon: Joe Noyola M.D.;  Location: SURGERY [facility];  Service:    • RECOVERY  1/27/2016    Procedure: CATH LAB EP STUDY WITH SINUS NODE MODIFICATION LA;  Surgeon: Recoveryonly Surgery;  Location: SURGERY PRE-POST PROC UNIT [facility];  Service:    • OTHER CARDIAC SURGERY  1/2016    cardiac ablation   • NEURO DEST FACET L/S W/IG SNGL  4/21/2015    Performed by Reza Tabor at [facility]   • LUMBAR FUSION ANTERIOR  8/21/2012    Performed by SUSIE SAWANT at [facility]   • ALYSSA BY LAPAROSCOPY  8/29/2010    Performed by SHAYY JOHNS at [facility]   • OTHER ABDOMINAL SURGERY     • TONSILLECTOMY      tonsillectomy     Family History   Problem Relation Age of Onset   • Hypertension Mother    • Sleep Apnea Mother    • Heart Disease Mother    • Other Mother      hypothryod   • Hypertension Maternal Uncle    • Heart Disease Maternal Grandmother    • Hypertension Maternal Grandmother    • Other Sister      Narcolepsy;fibromyalsia;bone;nerve   • Genitourinary () Sister      endometriosis     Social History     Social History   • Marital " "status: Single     Spouse name: N/A   • Number of children: N/A   • Years of education: N/A     Occupational History   • Not on file.     Social History Main Topics   • Smoking status: Never Smoker   • Smokeless tobacco: Never Used   • Alcohol use No   • Drug use: No   • Sexual activity: Not Currently     Birth control/ protection: Pill     Other Topics Concern   • Not on file     Social History Narrative    ** Merged History Encounter **            Objective:     Vitals: /82   Pulse 80   Temp 37 °C (98.6 °F)   Resp 14   Ht 1.651 m (5' 5\")   Wt 124.7 kg (275 lb)   SpO2 96%   BMI 45.76 kg/m²   General: Alert, pleasant, NAD  HEENT: Normocephalic.    Skin: Warm, dry.  Scattered erythematous maculopapular lesions across her chest. On her right breast, there are 2 healing superficial lesions, one measuring 0.5 cm x 1 cm, the other measuring 0.5 cm in diameter. On her left breast, there is a well-defined, circular 1.5 x 1 cm superficial ulcer that is oozing blood and serosanguineous fluid. No surrounding erythema, no fluctuance, no subcutaneous masses.    Assessment/Plan:     Kristin was seen today for rash.    Diagnoses and all orders for this visit:    Nonhealing skin ulcer, limited to breakdown of skin (CMS-HCC)  Skin lesion of breast  Somewhat unusual ulcer on her left breast, does not appear to be cellulitis either. Recommended that she stop the topical antibacterial creams and try petroleum jelly for now. Referral to wound clinic placed. Also recommended that she follow-up with her dermatologist  -     REFERRAL TO WOUND CLINIC    Morbid obesity with BMI of 45.0-49.9, adult (CMS-[facility])  -     Patient identified as having weight management issue.  Appropriate orders and counseling given.        Return if symptoms worsen or fail to improve.  "

## 2017-10-24 NOTE — TELEPHONE ENCOUNTER
Pt. Called to report an episode of chest pain radiating to left arm and left side of neck accompanied by rapid pulse which awoke her about 3am. Episode lasted 2 hrs. Per pt. Pt. Has no sx. Now, but heart rate=90's. Scheduled pt. To see Cassy tomorrow. If sx. Recur she will go to ER.

## 2017-10-25 ENCOUNTER — OFFICE VISIT (OUTPATIENT)
Dept: CARDIOLOGY | Facility: MEDICAL CENTER | Age: 27
End: 2017-10-25
Payer: MEDICARE

## 2017-10-25 VITALS
OXYGEN SATURATION: 94 % | SYSTOLIC BLOOD PRESSURE: 118 MMHG | DIASTOLIC BLOOD PRESSURE: 82 MMHG | BODY MASS INDEX: 45.65 KG/M2 | RESPIRATION RATE: 20 BRPM | WEIGHT: 274 LBS | HEIGHT: 65 IN | HEART RATE: 74 BPM

## 2017-10-25 DIAGNOSIS — R00.2 PALPITATIONS: ICD-10-CM

## 2017-10-25 DIAGNOSIS — R06.02 SOB (SHORTNESS OF BREATH): ICD-10-CM

## 2017-10-25 DIAGNOSIS — R07.89 CHEST PAIN RADIATING TO UPPER EXTREMITY: ICD-10-CM

## 2017-10-25 LAB — EKG IMPRESSION: NORMAL

## 2017-10-25 PROCEDURE — 93000 ELECTROCARDIOGRAM COMPLETE: CPT | Performed by: INTERNAL MEDICINE

## 2017-10-25 PROCEDURE — 99214 OFFICE O/P EST MOD 30 MIN: CPT | Performed by: NURSE PRACTITIONER

## 2017-10-25 RX ORDER — NITROGLYCERIN 0.4 MG/1
0.4 TABLET SUBLINGUAL PRN
Qty: 25 TAB | Refills: 0 | Status: SHIPPED | OUTPATIENT
Start: 2017-10-25 | End: 2018-01-09

## 2017-10-25 ASSESSMENT — ENCOUNTER SYMPTOMS
BRUISES/BLEEDS EASILY: 0
ORTHOPNEA: 0
BLOOD IN STOOL: 0
MYALGIAS: 1
NAUSEA: 0
BLURRED VISION: 1
PND: 0
PALPITATIONS: 1
NERVOUS/ANXIOUS: 0
CHILLS: 0
VOMITING: 0
FEVER: 0
WHEEZING: 0
WEAKNESS: 0
DIZZINESS: 0
STRIDOR: 0
SENSORY CHANGE: 0
SPUTUM PRODUCTION: 0
DIAPHORESIS: 0
SPEECH CHANGE: 0
FOCAL WEAKNESS: 0
HEADACHES: 0
INSOMNIA: 0
NECK PAIN: 1
HEMOPTYSIS: 0
SHORTNESS OF BREATH: 1
ABDOMINAL PAIN: 0
DEPRESSION: 0
BACK PAIN: 1
COUGH: 0
CLAUDICATION: 0
DIARRHEA: 0

## 2017-10-25 NOTE — LETTER
"     [facility]   [address], [address]  [place] [address]  Phone: [phone]  Fax: [phone]              Kristin Balderrama  1990    Encounter Date: 10/25/2017    VANIA Frost          PROGRESS NOTE:  Subjective:   Kristin Balderrama is a 27 y.o. female who presents today after experiencing 2 episodes of \"wrenching\" CP, SOB, palpitations, & shaking/loss of strength in her left arm yesterday (1st at 21:00 & 2nd at 03:00).  They each lasted approximately 2 hours, & was relieved by one SL NTG.  It took awhile for her to recover from them, as she get fatigued afterward. Nothing seems to bring it on.  In fact, the last one woke her up from sleep. She has an extensive medical history, including several episodes like this.  Of note, she is post SVT ablation by Dr. Kumar in 2016 & has had previous episodes of pAF.  Her clinic EKG today showed SR rate 71 with no ST abnormalities.  She does appear quite edematous today, although that is difficult to decipher given her body habitus.  She was reportedly taken off of her lasix 2 months ago by her kidney doctor.  I asked her to discuss getting back on the lasix with him.  She has had an echo this year which showed a preserved EF.  Her last BMP on 10/17/17 showed no lyte imbalances.  She is currently CP free.    Past Medical History:   Diagnosis Date   • Anginal syndrome     random chest pain especially with tachycardia   • Arrhythmia     \"sinus tachycardia\", cariologist, Dr. Kumar; ablation 2/2016   • Arthritis     osteo   • ASTHMA     when around smoke   • Borderline personality disorder    • Breath shortness     with tachycardia   • Chronic UTI 9/18/2010   • Disorder of thyroid    • Fall    • Gynecological disorder     PCOS   • Headache(784.0)    • Heart burn    • History of falling    • Hypertension    • Migraine    • 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CMS-HCC)    • Multiple personality disorder    • Obesity    • " "Pain 08-15-12    back, 7/10   • PCOS (polycystic ovarian syndrome)    • Pneumonia 2012   • Psychosis    • Renal disorder     \"kidney disease, stage 1\" nephrologist, Dr. 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   • Urinary bladder disorder     Suprapubic cath   • Urinary incontinence      Past Surgical History:   Procedure Laterality Date   • MUSCLE BIOPSY Right 1/26/2017    Procedure: MUSCLE BIOPSY - THIGH;  Surgeon: Isidro Vigil M.D.;  Location: [facility];  Service:    • GASTROSCOPY WITH BALLOON DILATATION N/A 1/4/2017    Procedure: GASTROSCOPY WITH DILATATION;  Surgeon: Torres Vargas M.D.;  Location: [facility];  Service:    • BOWEL STIMULATOR INSERTION  7/13/2016    Procedure: BOWEL STIMULATOR INSERTION FOR PERMANENT INTERSTIM SACRAL IMPLANT;  Surgeon: Joe Noyola M.D.;  Location: [facility];  Service:    • RECOVERY  1/27/2016    Procedure: CATH LAB EP STUDY WITH SINUS NODE MODIFICATION ABHINAV;  Surgeon: Recoveryonly Surgery;  Location: SURGERY PRE[facility] PROC UN[facility];  Service:    • OTHER CARDIAC SURGERY  1/2016    cardiac ablation   • NEURO DEST FACET L/S W/IG SNGL  4/21/2015    Performed by Reza Tabor at [facility]   • LUMBAR FUSION ANTERIOR  8/21/2012    Performed by SUSIE SAWANT at [facility]   • ALYSSA BY LAPAROSCOPY  8/29/2010    Performed by SHAYY JOHNS at [facility]   • OTHER ABDOMINAL SURGERY     • TONSILLECTOMY      tonsillectomy     Family History   Problem Relation Age of Onset   • Hypertension Mother    • Sleep Apnea Mother    • Heart Disease Mother    • Other Mother      hypothryod   • Hypertension Maternal Uncle    • Heart Disease Maternal Grandmother    • Hypertension Maternal Grandmother    • Other Sister      Narcolepsy;fibromyalsia;bone;nerve   • " "Genitourinary () Sister      endometriosis     History   Smoking Status   • Never Smoker   Smokeless Tobacco   • Never Used     Allergies   Allergen Reactions   • Cefdinir Shortness of Breath and Itching     Tolerated 1/18/17   • Depakote [Divalproex Sodium] Unspecified     Muscle spasms/muscle pain and weakness     • Abilify Unspecified     Headaches/muscle twitching     • Amitriptyline Unspecified     Headaches     • Amoxicillin Rash     Pt states \"I get a rash\".     • Ciprofloxacin Rash     Pt states \"I get a rash\".     • Clindamycin Nausea     Even with food     • Ees [Erythromycin] Vomiting and Nausea   • Flagyl [Metronidazole Hcl] Unspecified     \"eye problems\"     • Flomax [Tamsulosin Hydrochloride] Swelling   • Metformin Unspecified     Increased lactic acid      • Sulfa Drugs Hives and Rash     RXN=since childhood   • Tape Rash     Tears skin off   coban with Tegaderm tape ok  RXN=ongoing   • Vancomycin Itching     Pt becomes flushed in face and chest.   RXN=7/10/16   • Cephalexin [Keflex] Rash     Pt states she gets a rash with this medication   • Erythromycin Rash     .   • Levofloxacin Unspecified     Leg muscle cramps   • Metronidazole Rash     .   • Valproic Acid Rash     .     Outpatient Encounter Prescriptions as of 10/25/2017   Medication Sig Dispense Refill   • nitroglycerin (NITROSTAT) 0.4 MG SL Tab Place 1 Tab under tongue as needed for Chest Pain. 25 Tab 0   • ivabradine (CORLANOR) 5 MG Tab tablet Take 1 Tab by mouth 2 times a day, with meals. 60 Tab 2   • hydrOXYzine pamoate (VISTARIL) 25 MG Cap Take 1 Cap by mouth 3 times a day as needed for Other (May cause sedation). 30 Cap 3   • CLINDAMYCIN PHOSPHATE,TOPICAL, (CLINDAMAX) 1 % Lotion Apply 1 g to affected area(s) 2 times a day. 1 Bottle 1   • chlorhexidine (HIBICLENS) 4 % liquid Rinse the area with water. Apply the minimum amount of HIBICLENS necessary to cover the skin area and wash gently. Rinse thoroughly 240 mL 1   • sumatriptan " (IMITREX) 20 MG/ACT nasal spray Spray 1 Spray in nose as needed for Migraine. 10 Each 11   • neomycin-polymyxin-HC (PEDIOTIC HC) 3.5-34107-6 Suspension Place 4 Drops in ear 3 times a day. 1 Bottle 0   • silver sulfADIAZINE (SILVADENE) 1 % Cream Apply small amount to affected area daily until healed, loosely cover with bandage 20 g 0   • fluoxetine (PROZAC) 10 MG Cap TAKE ONE CAPSULE BY MOUTH ONCE A DAY 30 Cap 2   • ziprasidone (GEODON) 80 MG Cap Take 1 Cap by mouth 2 Times a Day. 60 Cap 2   • fluticasone (FLONASE) 50 MCG/ACT nasal spray Spray 2 Sprays in nose every day. 16 g 11   • loratadine (CLARITIN) 10 MG Tab Take 1 Tab by mouth every day. 30 Tab    • oxycodone-acetaminophen (PERCOCET-10)  MG Tab Take 2 Tabs by mouth 2 Times a Day. Indications: Pain     • mupirocin (BACTROBAN) 2 % Ointment Apply 1 Application to affected area(s) every day. Pt started on 7/23/2017 for cellulitis of breast     • sitagliptin (JANUVIA) 100 MG Tab Take 1 Tab by mouth every day. 30 Tab 6   • GRALISE 600 MG Tab Take 600 mg by mouth 3 times a day.     • nitroGLYCERIN (NITROSTAT) 0.3 MG SL tablet PLACE 1 TABLET UNDER TONGUE IF NEEDED FOR CHEST PAIN EVERY 5 MINUTES FOR 3 DOSES AS DIRECTED  0   • Mirabegron ER (MYRBETRIQ) 25 MG TABLET SR 24 HR Take 1 Tab by mouth every day.     • lactobacillus granules (LACTINEX/FLORANEX) Pack Take 1 Packet by mouth 3 times a day, with meals.     • levothyroxine (SYNTHROID) 75 MCG Tab Take 75 mcg by mouth Every morning on an empty stomach.     • lactulose 10 GM/15ML Solution Take 10 g by mouth 2 times a day as needed (For constipation).     • promethazine (PHENERGAN) 25 MG Tab Take 1 Tab by mouth every 6 hours as needed for Nausea/Vomiting. 30 Tab 6   • aspirin EC (ECOTRIN) 81 MG Tablet Delayed Response Take 1 Tab by mouth every day. 30 Tab 6   • baclofen (LIORESAL) 10 MG Tab Take 1 Tab by mouth 3 times a day. 90 Tab 6   • Melatonin 5 MG Tab Take 10 mg by mouth every bedtime.     • fentanyl  "(DURAGESIC) 25 MCG/HR PATCH 72 HR Apply 1 Patch to skin as directed every 72 hours.     • vitamin D, Ergocalciferol, (DRISDOL) 76869 UNITS Cap capsule Take 50,000 Units by mouth every Friday.     • cyanocobalamin (HM VITAMIN B12) 500 MCG tablet Take 500 mcg by mouth 2 times a day.     • furosemide (LASIX) 40 MG Tab Take 40 mg by mouth every day.     • [DISCONTINUED] ivabradine (CORLANOR) 5 MG Tab tablet Take 1 Tab by mouth 2 times a day, with meals. 60 Tab 6   • sodium bicarbonate 325 MG Tab Take 2 Tabs by mouth 3 times a day.       No facility-administered encounter medications on file as of 10/25/2017.      Review of Systems   Constitutional: Positive for malaise/fatigue. Negative for chills, diaphoresis and fever.   HENT: Negative for congestion and nosebleeds.    Eyes: Positive for blurred vision.        Secondary to optic neuritis     Respiratory: Positive for shortness of breath. Negative for cough, hemoptysis, sputum production, wheezing and stridor.    Cardiovascular: Positive for chest pain and palpitations. Negative for orthopnea, claudication, leg swelling and PND.   Gastrointestinal: Negative for abdominal pain, blood in stool, diarrhea, melena, nausea and vomiting.   Genitourinary: Negative for dysuria, frequency, hematuria and urgency.   Musculoskeletal: Positive for back pain, joint pain, myalgias and neck pain.   Neurological: Negative for dizziness, sensory change, speech change, focal weakness, weakness and headaches.   Endo/Heme/Allergies: Does not bruise/bleed easily.   Psychiatric/Behavioral: Negative for depression. The patient is not nervous/anxious and does not have insomnia.    All other systems reviewed and are negative.     Objective:   /82   Pulse 74   Resp 20   Ht 1.651 m (5' 5\")   Wt 124.3 kg (274 lb)   SpO2 94%   BMI 45.60 kg/m²      Physical Exam   Constitutional: She is oriented to person, place, and time. She appears well-developed and well-nourished. No distress.   "   Obese   HENT:   Head: Normocephalic and atraumatic.   Eyes: Pupils are equal, round, and reactive to light. Right eye exhibits no discharge. Left eye exhibits no discharge. No scleral icterus.   Neck: Normal range of motion. Neck supple. No JVD present.   Cardiovascular: Normal rate, regular rhythm, normal heart sounds and intact distal pulses.  Exam reveals no gallop and no friction rub.    No murmur heard.  Pulmonary/Chest: Effort normal and breath sounds normal. No stridor. No respiratory distress. She has no wheezes. She has no rales.   Abdominal: Soft. Bowel sounds are normal. She exhibits no distension. There is no tenderness. There is no rebound and no guarding.   Musculoskeletal: Normal range of motion. She exhibits edema.   BLE edema  Has walker for gait assistance   Neurological: She is alert and oriented to person, place, and time.   Skin: Skin is warm and dry. No rash noted. She is not diaphoretic. No erythema. No pallor.   Psychiatric: She has a normal mood and affect. Her behavior is normal. Judgment and thought content normal.   Nursing note and vitals reviewed.    Assessment:     1. Chest pain radiating to upper extremity  [facility] EKG (Clinic Performed)    EKG    ZIO PATCH MONITOR   2. Palpitations     3. SOB (shortness of breath)       Medical Decision Making:  Today's Assessment / Status / Plan:     Given that we don't know what rhythm she is in when these episodes occur, I've ordered a ziopatch for monitoring.  I did not make any changes to her medication at this time, although I did refill her anti-anginal agent & SL NTG. She was instructed to seek treatment in the ED if these episodes become more frequent or more bothersome.  She will f/u with Dr. Kumar in 1 month.     Collaborating MD:  Dr. Abhishek Brody M.D.  [address]  [address]  [place] [address]  VIA In Basket     Torres Kumar M.D.  [address]  [address]  [place] [address]  VIA In Basket

## 2017-10-25 NOTE — PROGRESS NOTES
"Subjective:   Kristin Balderrama is a 27 y.o. female who presents today after experiencing 2 episodes of \"wrenching\" CP, SOB, palpitations, & shaking/loss of strength in her left arm yesterday (1st at 21:00 & 2nd at 03:00).  They each lasted approximately 2 hours, & was relieved by one SL NTG.  It took awhile for her to recover from them, as she get fatigued afterward. Nothing seems to bring it on.  In fact, the last one woke her up from sleep. She has an extensive medical history, including several episodes like this.  Of note, she is post SVT ablation by Dr. Kumar in 2016 & has had previous episodes of pAF.  Her clinic EKG today showed SR rate 71 with no ST abnormalities.  She does appear quite edematous today, although that is difficult to decipher given her body habitus.  She was reportedly taken off of her lasix 2 months ago by her kidney doctor.  I asked her to discuss getting back on the lasix with him.  She has had an echo this year which showed a preserved EF.  Her last BMP on 10/17/17 showed no lyte imbalances.  She is currently CP free.    Past Medical History:   Diagnosis Date   • Anginal syndrome     random chest pain especially with tachycardia   • Arrhythmia     \"sinus tachycardia\", cariologist, Dr. Kumar; ablation 2/2016   • Arthritis     osteo   • ASTHMA     when around smoke   • Borderline personality disorder    • Breath shortness     with tachycardia   • Chronic UTI 9/18/2010   • Disorder of thyroid    • Fall    • Gynecological disorder     PCOS   • Headache(784.0)    • Heart burn    • History of falling    • Hypertension    • Migraine    • 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CMS-HCC)    • Multiple personality disorder    • Obesity    • Pain 08-15-12    back, 7/10   • PCOS (polycystic ovarian syndrome)    • Pneumonia 2012   • Psychosis    • Renal disorder     \"kidney disease, stage 1\" nephrologist, Dr. Vallejo   • Scoliosis    • Sinus tachycardia 10/31/2013   • Sleep apnea     CPAP \"pulmonary doctor took me " "off mid year 2016\"   • Tuberculosis     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   • Urinary bladder disorder     Suprapubic cath   • Urinary incontinence      Past Surgical History:   Procedure Laterality Date   • MUSCLE BIOPSY Right 1/26/2017    Procedure: MUSCLE BIOPSY - THIGH;  Surgeon: Isidro Vigil M.D.;  Location: SURGERY [facility];  Service:    • GASTROSCOPY WITH BALLOON DILATATION N/A 1/4/2017    Procedure: GASTROSCOPY WITH DILATATION;  Surgeon: Torres Vargas M.D.;  Location: SURGERY [facility];  Service:    • BOWEL STIMULATOR INSERTION  7/13/2016    Procedure: BOWEL STIMULATOR INSERTION FOR PERMANENT INTERSTIM SACRAL IMPLANT;  Surgeon: Joe Noyola M.D.;  Location: SURGERY [facility];  Service:    • RECOVERY  1/27/2016    Procedure: CATH LAB EP STUDY WITH SINUS NODE MODIFICATION LA;  Surgeon: [facility];  [place] [facility] [place]  Service:    • OTHER CARDIAC SURGERY  1/2016    cardiac ablation   • NEURO DEST FACET L/S W/IG SNGL  4/21/2015    Performed by Reza Tabor at SURGERY [facility]   • LUMBAR FUSION ANTERIOR  8/21/2012    Performed by SUSIE SAWANT at SURGERY [facility]S   • ALYSSA BY LAPAROSCOPY  8/29/2010    Performed by SHAYY JOHNS at SURGERY [facility] ORS   • OTHER ABDOMINAL SURGERY     • TONSILLECTOMY      tonsillectomy     Family History   Problem Relation Age of Onset   • Hypertension Mother    • Sleep Apnea Mother    • Heart Disease Mother    • Other Mother      hypothryod   • Hypertension Maternal Uncle    • Heart Disease Maternal Grandmother    • Hypertension Maternal Grandmother    • Other Sister      Narcolepsy;fibromyalsia;bone;nerve   • Genitourinary () Sister      endometriosis     History   Smoking Status   • Never Smoker   Smokeless Tobacco   • Never Used     Allergies   Allergen Reactions   • Cefdinir Shortness of Breath and Itching     Tolerated 1/18/17   • Depakote [Divalproex Sodium] Unspecified     Muscle " "spasms/muscle pain and weakness     • Abilify Unspecified     Headaches/muscle twitching     • Amitriptyline Unspecified     Headaches     • Amoxicillin Rash     Pt states \"I get a rash\".     • Ciprofloxacin Rash     Pt states \"I get a rash\".     • Clindamycin Nausea     Even with food     • Ees [Erythromycin] Vomiting and Nausea   • Flagyl [Metronidazole Hcl] Unspecified     \"eye problems\"     • Flomax [Tamsulosin Hydrochloride] Swelling   • Metformin Unspecified     Increased lactic acid      • Sulfa Drugs Hives and Rash     RXN=since childhood   • Tape Rash     Tears skin off   coban with Tegaderm tape ok  RXN=ongoing   • Vancomycin Itching     Pt becomes flushed in face and chest.   RXN=7/10/16   • Cephalexin [Keflex] Rash     Pt states she gets a rash with this medication   • Erythromycin Rash     .   • Levofloxacin Unspecified     Leg muscle cramps   • Metronidazole Rash     .   • Valproic Acid Rash     .     Outpatient Encounter Prescriptions as of 10/25/2017   Medication Sig Dispense Refill   • nitroglycerin (NITROSTAT) 0.4 MG SL Tab Place 1 Tab under tongue as needed for Chest Pain. 25 Tab 0   • ivabradine (CORLANOR) 5 MG Tab tablet Take 1 Tab by mouth 2 times a day, with meals. 60 Tab 2   • hydrOXYzine pamoate (VISTARIL) 25 MG Cap Take 1 Cap by mouth 3 times a day as needed for Other (May cause sedation). 30 Cap 3   • CLINDAMYCIN PHOSPHATE,TOPICAL, (CLINDAMAX) 1 % Lotion Apply 1 g to affected area(s) 2 times a day. 1 Bottle 1   • chlorhexidine (HIBICLENS) 4 % liquid Rinse the area with water. Apply the minimum amount of HIBICLENS necessary to cover the skin area and wash gently. Rinse thoroughly 240 mL 1   • sumatriptan (IMITREX) 20 MG/ACT nasal spray Spray 1 Spray in nose as needed for Migraine. 10 Each 11   • neomycin-polymyxin-HC (PEDIOTIC HC) 3.5-48681-6 Suspension Place 4 Drops in ear 3 times a day. 1 Bottle 0   • silver sulfADIAZINE (SILVADENE) 1 % Cream Apply small amount to affected area daily " until healed, loosely cover with bandage 20 g 0   • fluoxetine (PROZAC) 10 MG Cap TAKE ONE CAPSULE BY MOUTH ONCE A DAY 30 Cap 2   • ziprasidone (GEODON) 80 MG Cap Take 1 Cap by mouth 2 Times a Day. 60 Cap 2   • fluticasone (FLONASE) 50 MCG/ACT nasal spray Spray 2 Sprays in nose every day. 16 g 11   • loratadine (CLARITIN) 10 MG Tab Take 1 Tab by mouth every day. 30 Tab    • oxycodone-acetaminophen (PERCOCET-10)  MG Tab Take 2 Tabs by mouth 2 Times a Day. Indications: Pain     • mupirocin (BACTROBAN) 2 % Ointment Apply 1 Application to affected area(s) every day. Pt started on 7/23/2017 for cellulitis of breast     • sitagliptin (JANUVIA) 100 MG Tab Take 1 Tab by mouth every day. 30 Tab 6   • GRALISE 600 MG Tab Take 600 mg by mouth 3 times a day.     • nitroGLYCERIN (NITROSTAT) 0.3 MG SL tablet PLACE 1 TABLET UNDER TONGUE IF NEEDED FOR CHEST PAIN EVERY 5 MINUTES FOR 3 DOSES AS DIRECTED  0   • Mirabegron ER (MYRBETRIQ) 25 MG TABLET SR 24 HR Take 1 Tab by mouth every day.     • lactobacillus granules (LACTINEX/FLORANEX) Pack Take 1 Packet by mouth 3 times a day, with meals.     • levothyroxine (SYNTHROID) 75 MCG Tab Take 75 mcg by mouth Every morning on an empty stomach.     • lactulose 10 GM/15ML Solution Take 10 g by mouth 2 times a day as needed (For constipation).     • promethazine (PHENERGAN) 25 MG Tab Take 1 Tab by mouth every 6 hours as needed for Nausea/Vomiting. 30 Tab 6   • aspirin EC (ECOTRIN) 81 MG Tablet Delayed Response Take 1 Tab by mouth every day. 30 Tab 6   • baclofen (LIORESAL) 10 MG Tab Take 1 Tab by mouth 3 times a day. 90 Tab 6   • Melatonin 5 MG Tab Take 10 mg by mouth every bedtime.     • fentanyl (DURAGESIC) 25 MCG/HR PATCH 72 HR Apply 1 Patch to skin as directed every 72 hours.     • vitamin D, Ergocalciferol, (DRISDOL) 00349 UNITS Cap capsule Take 50,000 Units by mouth every Friday.     • cyanocobalamin (HM VITAMIN B12) 500 MCG tablet Take 500 mcg by mouth 2 times a day.     •  "furosemide (LASIX) 40 MG Tab Take 40 mg by mouth every day.     • [DISCONTINUED] ivabradine (CORLANOR) 5 MG Tab tablet Take 1 Tab by mouth 2 times a day, with meals. 60 Tab 6   • sodium bicarbonate 325 MG Tab Take 2 Tabs by mouth 3 times a day.       No facility-administered encounter medications on file as of 10/25/2017.      Review of Systems   Constitutional: Positive for malaise/fatigue. Negative for chills, diaphoresis and fever.   HENT: Negative for congestion and nosebleeds.    Eyes: Positive for blurred vision.        Secondary to optic neuritis     Respiratory: Positive for shortness of breath. Negative for cough, hemoptysis, sputum production, wheezing and stridor.    Cardiovascular: Positive for chest pain and palpitations. Negative for orthopnea, claudication, leg swelling and PND.   Gastrointestinal: Negative for abdominal pain, blood in stool, diarrhea, melena, nausea and vomiting.   Genitourinary: Negative for dysuria, frequency, hematuria and urgency.   Musculoskeletal: Positive for back pain, joint pain, myalgias and neck pain.   Neurological: Negative for dizziness, sensory change, speech change, focal weakness, weakness and headaches.   Endo/Heme/Allergies: Does not bruise/bleed easily.   Psychiatric/Behavioral: Negative for depression. The patient is not nervous/anxious and does not have insomnia.    All other systems reviewed and are negative.     Objective:   /82   Pulse 74   Resp 20   Ht 1.651 m (5' 5\")   Wt 124.3 kg (274 lb)   SpO2 94%   BMI 45.60 kg/m²     Physical Exam   Constitutional: She is oriented to person, place, and time. She appears well-developed and well-nourished. No distress.   Obese   HENT:   Head: Normocephalic and atraumatic.   Eyes: Pupils are equal, round, and reactive to light. Right eye exhibits no discharge. Left eye exhibits no discharge. No scleral icterus.   Neck: Normal range of motion. Neck supple. No JVD present.   Cardiovascular: Normal rate, regular " rhythm, normal heart sounds and intact distal pulses.  Exam reveals no gallop and no friction rub.    No murmur heard.  Pulmonary/Chest: Effort normal and breath sounds normal. No stridor. No respiratory distress. She has no wheezes. She has no rales.   Abdominal: Soft. Bowel sounds are normal. She exhibits no distension. There is no tenderness. There is no rebound and no guarding.   Musculoskeletal: Normal range of motion. She exhibits edema.   BLE edema  Has walker for gait assistance   Neurological: She is alert and oriented to person, place, and time.   Skin: Skin is warm and dry. No rash noted. She is not diaphoretic. No erythema. No pallor.   Psychiatric: She has a normal mood and affect. Her behavior is normal. Judgment and thought content normal.   Nursing note and vitals reviewed.    Assessment:     1. Chest pain radiating to upper extremity  RI EPIPHANY EKG (Clinic Performed)    EKG    ZIO PATCH MONITOR   2. Palpitations     3. SOB (shortness of breath)       Medical Decision Making:  Today's Assessment / Status / Plan:     Given that we don't know what rhythm she is in when these episodes occur, I've ordered a ziopatch for monitoring.  I did not make any changes to her medication at this time, although I did refill her anti-anginal agent & SL NTG. She was instructed to seek treatment in the ED if these episodes become more frequent or more bothersome.  She will f/u with Dr. Kumar in 1 month.     Collaborating MD:  Dr. Abhishek Andino

## 2017-10-27 ENCOUNTER — HOSPITAL ENCOUNTER (OUTPATIENT)
Facility: MEDICAL CENTER | Age: 27
End: 2017-10-27
Attending: NURSE PRACTITIONER
Payer: MEDICARE

## 2017-10-27 ENCOUNTER — NON-PROVIDER VISIT (OUTPATIENT)
Dept: WOUND CARE | Facility: MEDICAL CENTER | Age: 27
End: 2017-10-27
Attending: FAMILY MEDICINE
Payer: MEDICARE

## 2017-10-27 DIAGNOSIS — L08.9 WOUND INFECTION: ICD-10-CM

## 2017-10-27 DIAGNOSIS — L08.9 WOUND INFECTION: Primary | ICD-10-CM

## 2017-10-27 DIAGNOSIS — T14.8XXA WOUND INFECTION: Primary | ICD-10-CM

## 2017-10-27 DIAGNOSIS — T14.8XXA WOUND INFECTION: ICD-10-CM

## 2017-10-27 LAB
GRAM STN SPEC: NORMAL
SIGNIFICANT IND 70042: NORMAL
SITE SITE: NORMAL
SOURCE SOURCE: NORMAL

## 2017-10-27 PROCEDURE — 87205 SMEAR GRAM STAIN: CPT

## 2017-10-27 PROCEDURE — 87070 CULTURE OTHR SPECIMN AEROBIC: CPT

## 2017-10-27 PROCEDURE — 97602 WOUND(S) CARE NON-SELECTIVE: CPT

## 2017-10-27 NOTE — CERTIFICATION
"Advanced Wound Care  [facility]  [address]  [address]  [place] [address]  [phone] Fax: [phone]      Initial Evaluation  For Certification Period:  10/27/17 - 1/17/18      Referring Physician: MD Jose  Primary Physician:        Consulting Physicians:     EDD Starr    Wound(s): L breast  Start of Care: 10/27/17       Subjective:        HPI: Current wound started 4-5 weeks ago. Another one has been ongoing for 6 months. Has tried antibiotic ointments, but they are not healing.  Pt suspects spider bites, but didn't actually see them.              Pain:  7/10          Past Medical History:   Past Medical History:   Diagnosis Date   • Anginal syndrome     random chest pain especially with tachycardia   • Arrhythmia     \"sinus tachycardia\", cariologist, Dr. Kumar; ablation 2/2016   • Arthritis     osteo   • ASTHMA     when around smoke   • Borderline personality disorder    • Breath shortness     with tachycardia   • Chronic UTI 9/18/2010   • Disorder of thyroid    • Fall    • Gynecological disorder     PCOS   • Headache(784.0)    • Heart burn    • History of falling    • Hypertension    • Migraine    • 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CMS-HCC)    • Multiple personality disorder    • Obesity    • Pain 08-15-12    back, 7/10   • PCOS (polycystic ovarian syndrome)    • Pneumonia 2012   • Psychosis    • Renal disorder     \"kidney disease, stage 1\" nephrologist, Dr. 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   • Urinary bladder disorder     Suprapubic cath   • Urinary incontinence      Current Medications:   Current Outpatient Prescriptions:   •  nitroglycerin (NITROSTAT) 0.4 MG SL Tab, Place 1 Tab under tongue as needed for Chest Pain., Disp: 25 Tab, Rfl: 0  •  ivabradine (CORLANOR) 5 MG Tab tablet, Take 1 Tab by mouth 2 times a day, with " meals., Disp: 60 Tab, Rfl: 2  •  hydrOXYzine pamoate (VISTARIL) 25 MG Cap, Take 1 Cap by mouth 3 times a day as needed for Other (May cause sedation)., Disp: 30 Cap, Rfl: 3  •  CLINDAMYCIN PHOSPHATE,TOPICAL, (CLINDAMAX) 1 % Lotion, Apply 1 g to affected area(s) 2 times a day., Disp: 1 Bottle, Rfl: 1  •  chlorhexidine (HIBICLENS) 4 % liquid, Rinse the area with water. Apply the minimum amount of HIBICLENS necessary to cover the skin area and wash gently. Rinse thoroughly, Disp: 240 mL, Rfl: 1  •  sumatriptan (IMITREX) 20 MG/ACT nasal spray, Spray 1 Spray in nose as needed for Migraine., Disp: 10 Each, Rfl: 11  •  neomycin-polymyxin-HC (PEDIOTIC HC) 3.5-25112-8 Suspension, Place 4 Drops in ear 3 times a day., Disp: 1 Bottle, Rfl: 0  •  silver sulfADIAZINE (SILVADENE) 1 % Cream, Apply small amount to affected area daily until healed, loosely cover with bandage, Disp: 20 g, Rfl: 0  •  fluoxetine (PROZAC) 10 MG Cap, TAKE ONE CAPSULE BY MOUTH ONCE A DAY, Disp: 30 Cap, Rfl: 2  •  ziprasidone (GEODON) 80 MG Cap, Take 1 Cap by mouth 2 Times a Day., Disp: 60 Cap, Rfl: 2  •  fluticasone (FLONASE) 50 MCG/ACT nasal spray, Spray 2 Sprays in nose every day., Disp: 16 g, Rfl: 11  •  loratadine (CLARITIN) 10 MG Tab, Take 1 Tab by mouth every day., Disp: 30 Tab, Rfl:   •  oxycodone-acetaminophen (PERCOCET-10)  MG Tab, Take 2 Tabs by mouth 2 Times a Day. Indications: Pain, Disp: , Rfl:   •  mupirocin (BACTROBAN) 2 % Ointment, Apply 1 Application to affected area(s) every day. Pt started on 7/23/2017 for cellulitis of breast, Disp: , Rfl:   •  sitagliptin (JANUVIA) 100 MG Tab, Take 1 Tab by mouth every day., Disp: 30 Tab, Rfl: 6  •  GRALISE 600 MG Tab, Take 600 mg by mouth 3 times a day., Disp: , Rfl:   •  nitroGLYCERIN (NITROSTAT) 0.3 MG SL tablet, PLACE 1 TABLET UNDER TONGUE IF NEEDED FOR CHEST PAIN EVERY 5 MINUTES FOR 3 DOSES AS DIRECTED, Disp: , Rfl: 0  •  Mirabegron ER (MYRBETRIQ) 25 MG TABLET SR 24 HR, Take 1 Tab by mouth  every day., Disp: , Rfl:   •  lactobacillus granules (LACTINEX/FLORANEX) Pack, Take 1 Packet by mouth 3 times a day, with meals., Disp: , Rfl:   •  levothyroxine (SYNTHROID) 75 MCG Tab, Take 75 mcg by mouth Every morning on an empty stomach., Disp: , Rfl:   •  lactulose 10 GM/15ML Solution, Take 10 g by mouth 2 times a day as needed (For constipation)., Disp: , Rfl:   •  furosemide (LASIX) 40 MG Tab, Take 40 mg by mouth every day., Disp: , Rfl:   •  promethazine (PHENERGAN) 25 MG Tab, Take 1 Tab by mouth every 6 hours as needed for Nausea/Vomiting., Disp: 30 Tab, Rfl: 6  •  aspirin EC (ECOTRIN) 81 MG Tablet Delayed Response, Take 1 Tab by mouth every day., Disp: 30 Tab, Rfl: 6  •  baclofen (LIORESAL) 10 MG Tab, Take 1 Tab by mouth 3 times a day., Disp: 90 Tab, Rfl: 6  •  Melatonin 5 MG Tab, Take 10 mg by mouth every bedtime., Disp: , Rfl:   •  fentanyl (DURAGESIC) 25 MCG/HR PATCH 72 HR, Apply 1 Patch to skin as directed every 72 hours., Disp: , Rfl:   •  vitamin D, Ergocalciferol, (DRISDOL) 57904 UNITS Cap capsule, Take 50,000 Units by mouth every Friday., Disp: , Rfl:   •  sodium bicarbonate 325 MG Tab, Take 2 Tabs by mouth 3 times a day., Disp: , Rfl:   Allergies: Cefdinir; Depakote [divalproex sodium]; Abilify; Amitriptyline; Amoxicillin; Ciprofloxacin; Clindamycin; Ees [erythromycin]; Flagyl [metronidazole hcl]; Flomax [tamsulosin hydrochloride]; Metformin; Sulfa drugs; Tape; Vancomycin; Cephalexin [keflex]; Erythromycin; Levofloxacin; Metronidazole; and Valproic acid  Past Surgical History:   Past Surgical History:   Procedure Laterality Date   • MUSCLE BIOPSY Right 1/26/2017    Procedure: MUSCLE BIOPSY - THIGH;  Surgeon: Isidro Vigil M.D.;  Location: SURGERY [facility];  Service:    • GASTROSCOPY WITH BALLOON DILATATION N/A 1/4/2017    Procedure: GASTROSCOPY WITH DILATATION;  Surgeon: Torres Vargas M.D.;  Location: SURGERY [facility];  Service:    • BOWEL STIMULATOR INSERTION  7/13/2016     Procedure: BOWEL STIMULATOR INSERTION FOR PERMANENT INTERSTIM SACRAL IMPLANT;  Surgeon: Joe Noyola M.D.;  Location: SURGERY [facility];  Service:    • RECOVERY  1/27/2016    Procedure: CATH LAB EP STUDY WITH SINUS NODE MODIFICATION ABHINAV;  Surgeon: [facility];  Location: SURGERY PRE-POST PROC UNIT [facility] [place] [place];  Service:    • OTHER CARDIAC SURGERY  1/2016    cardiac ablation   • NEURO DEST FACET L/S W/IG SNGL  4/21/2015    Performed by Reza Tabor at [facility]   • LUMBAR FUSION ANTERIOR  8/21/2012    Performed by SUSIE SAWANT at SURGERY [facility] ORS   • ALYSSA BY LAPAROSCOPY  8/29/2010    Performed by SHAYY JOHNS at SURGERY [facility] ORS   • OTHER ABDOMINAL SURGERY     • TONSILLECTOMY      tonsillectomy       Social History:    Social History     Social History   • Marital status: Single     Spouse name: N/A   • Number of children: N/A   • Years of education: N/A     Occupational History   • Not on file.     Social History Main Topics   • Smoking status: Never Smoker   • Smokeless tobacco: Never Used   • Alcohol use No   • Drug use: No   • Sexual activity: Not Currently     Birth control/ protection: Pill     Other Topics Concern   • Not on file     Social History Narrative    ** Merged History Encounter **                Objective:      Tests and Measures: Culture obtained L breast    Orthotic, protective, supportive devices: R leg brace for foot drop    Fall Risk Assessment (vickie all that apply with an X):              65 years or older                xFall within the last 2 years, uses   xAmbulatory devices   Loss of protective sensation in feet,    Use of prostethic/orthotic, years               Presence of lower extremity/foot/toe amputation              xTaking medication that increases risk (per facility policy)       Wound Characteristics                                                    Location: L breast   Initial Evaluation  Date:10/27/17   Tissue Type and %: Pink,  viable   Periwound: intact   Drainage: Scant, ss   Exposed structures none   Wound Edges:   open   Odor: none   S&S of Infection:   none   Edema: none   Sensation: intact               Measurements: Initial Evaluation  Date:   Length (cm) 1   Width (cm) 1.2   Depth (cm) 0.2   Area (cm2) 1.2   Tract/undermine none      Wound Characteristics                                                    Location: R breast   Initial Evaluation  Date:10/27/17   Tissue Type and %: Scab   Periwound: intact   Drainage: none   Exposed structures none   Wound Edges:   closed   Odor: none   S&S of Infection:   none   Edema: none   Sensation: intact               Measurements: Initial Evaluation  Date:   Length (cm) 0.5   Width (cm) 1.7   Depth (cm) JODI   Area (cm2) 0.85   Tract/undermine       Procedures:     Debridement :  Non selective debridement with gauze and cotton tipped applicator.   Cleansed with: NS                                                                         Periwound protected with: No sting skin prep   Primary dressing: Honey colloid for both wounds   Secondary Dressing: Nonadhesive foam and hypafix tape   Other:      Patient Education: Pt. Verbalized signs and symptoms of infection, including erythema, induration, increased pain, increased drainage and fever and to go to ER if any of these occur. Pt agrees to keep dressings clean and dry. Leftover supplies given her in case there is more drainage than expected.    Professional Collaboration: None today. Note to EDD Starr, who will see pt on Tuesday, 10/31/17.      Assessment:      Wound etiology: Question of spider bites.    Wound Progress:  New eval. Looks like R wound has been healing.    Rationale for Treatment: Debride, provide moist wound environment, manage bioburden.    Patient tolerance/compliance: Pt agrees with POC and tolerated treatment well.    Complicating factors: Diabetes, obesity    Need for ongoing Advanced Wound Care services: Pt  requires wound monitoring, dressing selection and education.      Plan:      Treatment Plan and Recommendations:  Diagnosis/ICD9: L98.491 Non-healing skin ulcer  L64.9 - Skin lesion of breast    Procedures/CPT:    Frequency: 2X/week      Treatment Goals: STG 2 Weeks  LTG 4 Weeks   Granulation Tissue: 5% 10%   Decrease Necrotic Tissue to: % %   Wound Phase:      Decrease Size by: % %   Periwound:      Decrease tracts/undermining by: % %   Decrease Pain:          At the time of each visit a thorough assessment of the patient is completed to assure the  appropriateness of our plan of care.  The dressings or modalities may need to be adapted   from the original plan to address any significant changes in the wound environment.          Clinician Signature:_______________________________Date__________________      Physician Signature:______________________________Date:__________________

## 2017-10-29 LAB
BACTERIA WND AEROBE CULT: NORMAL
GRAM STN SPEC: NORMAL
SIGNIFICANT IND 70042: NORMAL
SITE SITE: NORMAL
SOURCE SOURCE: NORMAL

## 2017-10-30 ENCOUNTER — TELEPHONE (OUTPATIENT)
Dept: MEDICAL GROUP | Facility: MEDICAL CENTER | Age: 27
End: 2017-10-30

## 2017-10-30 ENCOUNTER — TELEPHONE (OUTPATIENT)
Dept: CARDIOLOGY | Facility: MEDICAL CENTER | Age: 27
End: 2017-10-30

## 2017-10-30 ENCOUNTER — NON-PROVIDER VISIT (OUTPATIENT)
Dept: CARDIOLOGY | Facility: MEDICAL CENTER | Age: 27
End: 2017-10-30
Payer: MEDICARE

## 2017-10-30 DIAGNOSIS — R00.0 TACHYCARDIA: ICD-10-CM

## 2017-10-30 NOTE — TELEPHONE ENCOUNTER
VOICEMAIL  1. Caller Name: Nasreen                      Call Back Number: [phone]    2. Message: Pharmacy called and patient is trying to refill clindamycin lotion again. She states she is having to apply all over her body. They are requesting a new rx with different sig so it can be refilled for her again. Please advise.    3. Patient approves office to leave a detailed voicemail/Epizymehart message: N\A

## 2017-10-31 ENCOUNTER — OFFICE VISIT (OUTPATIENT)
Dept: WOUND CARE | Facility: MEDICAL CENTER | Age: 27
End: 2017-10-31
Attending: FAMILY MEDICINE
Payer: MEDICARE

## 2017-10-31 DIAGNOSIS — E66.01 MORBIDLY OBESE (HCC): ICD-10-CM

## 2017-10-31 DIAGNOSIS — E11.9 TYPE 2 DIABETES MELLITUS WITHOUT COMPLICATION, WITHOUT LONG-TERM CURRENT USE OF INSULIN (HCC): ICD-10-CM

## 2017-10-31 DIAGNOSIS — S21.009D BREAST WOUND, UNSPECIFIED LATERALITY, SUBSEQUENT ENCOUNTER: Primary | ICD-10-CM

## 2017-10-31 PROCEDURE — 97602 WOUND(S) CARE NON-SELECTIVE: CPT

## 2017-10-31 PROCEDURE — 99213 OFFICE O/P EST LOW 20 MIN: CPT

## 2017-10-31 PROCEDURE — 99214 OFFICE O/P EST MOD 30 MIN: CPT | Performed by: NURSE PRACTITIONER

## 2017-10-31 ASSESSMENT — ENCOUNTER SYMPTOMS
BACK PAIN: 1
SENSORY CHANGE: 0
DEPRESSION: 1
CHILLS: 0
DIARRHEA: 0
NAUSEA: 0
VOMITING: 0
FEVER: 0
COUGH: 0
CONSTIPATION: 0
NERVOUS/ANXIOUS: 0
DIZZINESS: 0
SHORTNESS OF BREATH: 1

## 2017-10-31 NOTE — PROGRESS NOTES
"Initial Provider Assessment      DATE OF CONSULTATION: 10/31/2017    Patient seen in collaboration with wound care clinician, Beckie Kidd RN     HISTORY OF PRESENT ILLNESS:  Patient is a 27-year-old female with very complicated past medical history, that includes type 2 diabetes, schizophrenia, and morbid obesity.  She has been referred to the wound clinic for wounds to both of her breasts.  The wound to her right breast started about 6 months ago, and the wound to her left breast has been present for about 4-6 weeks.  She thinks these are from spider bites.  She denies picking or scratching at the wounds.  She lives with her grandparents.  She does not know what her blood sugars are currently averaging.  Her A1c in early October of this year was 6.3.    She began treatment in the clinic on 10/27/17, and is being seen 1-2 times per week for wound assessment and treatment.  She presents today for wound care and for an initial provider assessment.        PAST MEDICAL HISTORY:   Past Medical History:   Diagnosis Date   • Anginal syndrome     random chest pain especially with tachycardia   • Arrhythmia     \"sinus tachycardia\", cariologist, Dr. Kumar; ablation 2/2016   • Arthritis     osteo   • ASTHMA     when around smoke   • Borderline personality disorder    • Breath shortness     with tachycardia   • Chronic UTI 9/18/2010   • Disorder of thyroid    • Fall    • Gynecological disorder     PCOS   • Headache(784.0)    • Heart burn    • History of falling    • Hypertension    • Migraine    • 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CMS-HCC)    • Multiple personality disorder    • Obesity    • Pain 08-15-12    back, 7/10   • PCOS (polycystic ovarian syndrome)    • Pneumonia 2012   • Psychosis    • Renal disorder     \"kidney disease, stage 1\" nephrologist, Dr. 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 Received " treatment.   • Urinary bladder disorder     Suprapubic cath   • Urinary incontinence        PAST SURGICAL HISTORY:   Past Surgical History:   Procedure Laterality Date   • MUSCLE BIOPSY Right 1/26/2017    Procedure: MUSCLE BIOPSY - THIGH;  Surgeon: Isidro Vigil M.D.;  Location: SURGERY [facility];  Service:    • GASTROSCOPY WITH BALLOON DILATATION N/A 1/4/2017    Procedure: GASTROSCOPY WITH DILATATION;  Surgeon: Torres Vargas M.D.;  Location: SURGERY [facility];  Service:    • BOWEL STIMULATOR INSERTION  7/13/2016    Procedure: BOWEL STIMULATOR INSERTION FOR PERMANENT INTERSTIM SACRAL IMPLANT;  Surgeon: Joe Noyola M.D.;  Location: SURGERY [facility];  Service:    • RECOVERY  1/27/2016    Procedure: CATH LAB EP STUDY WITH SINUS NODE MODIFICATION LA;  Surgeon: Recoveryonly Surgery;  Location: SURGERY PRE-POST PROC UNIT [facility];  Service:    • OTHER CARDIAC SURGERY  1/2016    cardiac ablation   • NEURO DEST FACET L/S W/IG SNGL  4/21/2015    Performed by Reza Tabor at [facility]   • LUMBAR FUSION ANTERIOR  8/21/2012    Performed by SUSIE SAWANT at SURGERY [facility] ORS   • ALYSSA BY LAPAROSCOPY  8/29/2010    Performed by SHAYY JOHNS at [facility]   • OTHER ABDOMINAL SURGERY     • TONSILLECTOMY      tonsillectomy        MEDICATIONS:   Current Outpatient Prescriptions   Medication   • nitroglycerin (NITROSTAT) 0.4 MG SL Tab   • ivabradine (CORLANOR) 5 MG Tab tablet   • hydrOXYzine pamoate (VISTARIL) 25 MG Cap   • chlorhexidine (HIBICLENS) 4 % liquid   • sumatriptan (IMITREX) 20 MG/ACT nasal spray   • neomycin-polymyxin-HC (PEDIOTIC HC) 3.5-47708-6 Suspension   • silver sulfADIAZINE (SILVADENE) 1 % Cream   • fluoxetine (PROZAC) 10 MG Cap   • ziprasidone (GEODON) 80 MG Cap   • fluticasone (FLONASE) 50 MCG/ACT nasal spray   • loratadine (CLARITIN) 10 MG Tab   • oxycodone-acetaminophen (PERCOCET-10)  MG Tab   • sitagliptin (JANUVIA) 100 MG Tab   • GRALISE 600  "MG Tab   • nitroGLYCERIN (NITROSTAT) 0.3 MG SL tablet   • Mirabegron ER (MYRBETRIQ) 25 MG TABLET SR 24 HR   • lactobacillus granules (LACTINEX/FLORANEX) Pack   • levothyroxine (SYNTHROID) 75 MCG Tab   • lactulose 10 GM/15ML Solution   • furosemide (LASIX) 40 MG Tab   • promethazine (PHENERGAN) 25 MG Tab   • aspirin EC (ECOTRIN) 81 MG Tablet Delayed Response   • baclofen (LIORESAL) 10 MG Tab   • Melatonin 5 MG Tab   • fentanyl (DURAGESIC) 25 MCG/HR PATCH 72 HR   • vitamin D, Ergocalciferol, (DRISDOL) 65440 UNITS Cap capsule   • sodium bicarbonate 325 MG Tab     No current facility-administered medications for this visit.        ALLERGIES:    Allergies   Allergen Reactions   • Cefdinir Shortness of Breath and Itching     Tolerated 1/18/17   • Depakote [Divalproex Sodium] Unspecified     Muscle spasms/muscle pain and weakness     • Abilify Unspecified     Headaches/muscle twitching     • Amitriptyline Unspecified     Headaches     • Amoxicillin Rash     Pt states \"I get a rash\".     • Ciprofloxacin Rash     Pt states \"I get a rash\".     • Clindamycin Nausea     Even with food     • Ees [Erythromycin] Vomiting and Nausea   • Flagyl [Metronidazole Hcl] Unspecified     \"eye problems\"     • Flomax [Tamsulosin Hydrochloride] Swelling   • Metformin Unspecified     Increased lactic acid      • Sulfa Drugs Hives and Rash     RXN=since childhood   • Tape Rash     Tears skin off   coban with Tegaderm tape ok  RXN=ongoing   • Vancomycin Itching     Pt becomes flushed in face and chest.   RXN=7/10/16   • Cephalexin [Keflex] Rash     Pt states she gets a rash with this medication   • Erythromycin Rash     .   • Levofloxacin Unspecified     Leg muscle cramps   • Metronidazole Rash     .   • Valproic Acid Rash     .         SOCIAL HISTORY:   Social History     Social History   • Marital status: Single     Spouse name: N/A   • Number of children: N/A   • Years of education: N/A     Social History Main Topics   • Smoking status: Never " Smoker   • Smokeless tobacco: Never Used   • Alcohol use No   • Drug use: No   • Sexual activity: Not Currently     Birth control/ protection: Pill     Other Topics Concern   • Not on file     Social History Narrative    ** Merged History Encounter **               Fall Risk Assessment (vickie all that apply with an X):   65 years or older     Fall within the last 2 years, uses   Ambulatory devices  Loss of protective sensation in feet,   Use of prostethic/orthotic, years    Presence of lower extremity/foot/toe amputation   Taking medication that increases risk (per facility policy)    Interventions Recommended (if any of the above are selected):   Use of Assistive Device:________________________   Supervision with ambulation:  Caregiver   Assistance with ambulation:  Caregiver   Home safety education:  Educational material provided    REVIEW OF SYSTEMS:   Review of Systems   Constitutional: Positive for malaise/fatigue. Negative for chills and fever.   Respiratory: Positive for shortness of breath. Negative for cough.         On oxygen at home 2-4 L/min   Cardiovascular: Positive for chest pain and leg swelling.   Gastrointestinal: Negative for constipation, diarrhea, nausea and vomiting.   Musculoskeletal: Positive for back pain and joint pain.   Skin: Negative for itching.   Neurological: Negative for dizziness and sensory change.   Psychiatric/Behavioral: Positive for depression. The patient is not nervous/anxious.         No suicidal ideations.  Taking antidepressants       PHYSICAL EXAMINATION:     Physical Exam   Constitutional: She is oriented to person, place, and time and well-developed, well-nourished, and in no distress.   obese   HENT:   Head: Normocephalic.   Eyes: Pupils are equal, round, and reactive to light.   Pulmonary/Chest: Effort normal.   Musculoskeletal:   Wheelchair for mobility   Neurological: She is alert and oriented to person, place, and time.   Poor historian   Skin: Skin is warm.   Full  thickness wounds to both breasts   Psychiatric:   Odd affect         Wound Characteristics                                                    Location: L breast Initial Evaluation  Date:10/27/17 Encounter Date: 10/31/2017   Tissue Type and %: Pink, viable 100% moist pink   Periwound: intact intact   Drainage: Scant, ss Scant serosanguinous    Exposed structures none none   Wound Edges:   open Open, flat   Odor: none none   S&S of Infection:   none none   Edema: none none   Sensation: intact Intact, tender               Measurements:left breast Initial Evaluation  Date: 10/27/2017 Encounter Date: 10/31/2017   Length (cm) 1 0.8   Width (cm) 1.2 1.2   Depth (cm) 0.2 0.1   Area (cm2) 1.2 0.96cm2   Tract/undermine none none       Wound Characteristics                                                    Location: R breast Initial Evaluation  Date:10/27/17 Encounter Date: 10/31/2017   Tissue Type and %: Scab 100% moist pink   Periwound: intact intact   Drainage: none Scant serosanguinous    Exposed structures none none   Wound Edges:   closed Open, flat   Odor: none none   S&S of Infection:   none none   Edema: none none   Sensation: intact intact               Measurements: right breast Initial Evaluation  Date: 10/27/2017 Encounter Date: 10/31/2017  Proximal / distal    Length (cm) 0.5 0.3 / 0.3   Width (cm) 1.7 0.3 / 0.3   Depth (cm) JODI 0.1 / 0.1   Area (cm2) 0.85 0.09 / 0.09cm2   Tract/undermine   None         PROCEDURE: Wound care completed by Beckie    PATIENT EDUCATION  -Pt advised to go to ER for any increased redness, swelling, drainage or odor, or if he develops fever, chills, nausea or vomiting.      ASSESSMENT AND PLAN:       ICD-10-CM   1. Breast wound, unspecified laterality, subsequent encounter- Bilateral breast wounds. Unclear etiology, possibly spider bites, although this does not explain chronicity.  Wounds appear to be superficial.  Pt denies picking at or scratching her wounds.  She is to continue in  wound clinic 2x/week for debridement and assessment.  She is advised to leave her dressings alone in between visits, keep them dry.  S21.009D   2. Morbidly obese (CMS-HCC)- complicating factor, poor wound healing E66.01   3. Type 2 diabetes mellitus without complication, without long-term current use of insulin (CMS-HCC)- currently well controlled, A1c 6.3.  Adverse effects of elevated blood glucose on wound healing discussed. Pt encouraged to keep her blood sugars down.  E11.9       Time spent with patient, 30 minutes- review of history and diagnostics, wound assessment, education and counseling

## 2017-10-31 NOTE — WOUND TEAM
"Advanced Wound Care  [facility]  [address]  [address]  [place] [address]  [phone] Fax: [phone]      Encounter Note  For Certification Period: 10/27/17 - 1/17/18      Referring Physician: MD Jose  Primary Physician:        Consulting Physicians:     EDD Starr    Wound(s): L breast  Start of Care: 10/27/17       Subjective:        HPI: Current wound started 4-5 weeks ago. Another one has been ongoing for 6 months. Has tried antibiotic ointments, but they are not healing.  Pt suspects spider bites, but didn't actually see them.              Pain:  7/10          Allergies:   Allergies   Allergen Reactions   • Cefdinir Shortness of Breath and Itching     Tolerated 1/18/17   • Depakote [Divalproex Sodium] Unspecified     Muscle spasms/muscle pain and weakness     • Abilify Unspecified     Headaches/muscle twitching     • Amitriptyline Unspecified     Headaches     • Amoxicillin Rash     Pt states \"I get a rash\".     • Ciprofloxacin Rash     Pt states \"I get a rash\".     • Clindamycin Nausea     Even with food     • Ees [Erythromycin] Vomiting and Nausea   • Flagyl [Metronidazole Hcl] Unspecified     \"eye problems\"     • Flomax [Tamsulosin Hydrochloride] Swelling   • Metformin Unspecified     Increased lactic acid      • Sulfa Drugs Hives and Rash     RXN=since childhood   • Tape Rash     Tears skin off   coban with Tegaderm tape ok  RXN=ongoing   • Vancomycin Itching     Pt becomes flushed in face and chest.   RXN=7/10/16   • Cephalexin [Keflex] Rash     Pt states she gets a rash with this medication   • Erythromycin Rash     .   • Levofloxacin Unspecified     Leg muscle cramps   • Metronidazole Rash     .   • Valproic Acid Rash     .           Objective:      Tests and Measures: 10/27: Culture obtained L breast, negative as of 10/31    Orthotic, protective, supportive devices: R leg brace for foot drop    Fall Risk Assessment (vickie all that apply with an X): Completed " 10/27/2017                 Wound Characteristics                                                    Location: L breast   Initial Evaluation  Date:10/27/17 Encounter Date: 10/31/2017   Tissue Type and %: Pink, viable 100% moist pink   Periwound: intact intact   Drainage: Scant, ss Scant serosanguinous    Exposed structures none none   Wound Edges:   open Open, flat   Odor: none none   S&S of Infection:   none none   Edema: none none   Sensation: intact Intact, tender               Measurements:left breast Initial Evaluation  Date: 10/27/2017 Encounter Date: 10/31/2017   Length (cm) 1 0.8   Width (cm) 1.2 1.2   Depth (cm) 0.2 0.1   Area (cm2) 1.2 0.96cm2   Tract/undermine none none      Wound Characteristics                                                    Location: R breast   Initial Evaluation  Date:10/27/17 Encounter Date: 10/31/2017   Tissue Type and %: Scab 100% moist pink   Periwound: intact intact   Drainage: none Scant serosanguinous    Exposed structures none none   Wound Edges:   closed Open, flat   Odor: none none   S&S of Infection:   none none   Edema: none none   Sensation: intact intact               Measurements: right breast Initial Evaluation  Date: 10/27/2017 Encounter Date: 10/31/2017  Proximal / distal    Length (cm) 0.5 0.3 / 0.3   Width (cm) 1.7 0.3 / 0.3   Depth (cm) JODI 0.1 / 0.1   Area (cm2) 0.85 0.09 / 0.09cm2   Tract/undermine  None      Procedures:     Debridement :  Non selective debridement with gauze and cotton tipped applicator.   Cleansed with: Normal saline and gauze                                                                   Periwound protected with: No sting skin prep   Primary dressing: Honey colloid   Secondary Dressing: silicone adhesive foams   Other:      Patient Education: POC discussed with patient, patient agreeable to coming 2x/weekly.  Patient had mentioned that she is suppose to be on oxygen and feeling of tiredness.  Patient is not wearing oxygen at this time.   Advised patient to go back to her primary to get a prescription for travel oxygen.  Patient agreed that she will go back soon.    Professional Collaboration: Candelario PUGA for initial evaluation      Assessment:      Wound etiology: Question of spider bites.    Wound Progress:  Wound appear smaller and measurements reflect.     Rationale for Treatment: Debride, provide moist wound environment, manage bioburden.    Patient tolerance/compliance: Pt agrees with POC and tolerated treatment well.    Complicating factors: Diabetes, obesity    Need for ongoing Advanced Wound Care services: Pt requires wound monitoring, dressing selection and education.      Plan:      Treatment Plan and Recommendations:  Diagnosis/ICD9: L98.491 Non-healing skin ulcer  L64.9 - Skin lesion of breast    Procedures/CPT:    Frequency: 2X/week      Treatment Goals: STG 2 Weeks  LTG 4 Weeks   Granulation Tissue: 5% 10%   Decrease Necrotic Tissue to: % %   Wound Phase:      Decrease Size by: % %   Periwound:      Decrease tracts/undermining by: % %   Decrease Pain:          At the time of each visit a thorough assessment of the patient is completed to assure the  appropriateness of our plan of care.  The dressings or modalities may need to be adapted   from the original plan to address any significant changes in the wound environment.          Clinician Signature:_______________________________Date__________________      Physician Signature:______________________________Date:__________________

## 2017-11-01 ENCOUNTER — NON-PROVIDER VISIT (OUTPATIENT)
Dept: CARDIOLOGY | Facility: MEDICAL CENTER | Age: 27
End: 2017-11-01
Payer: MEDICARE

## 2017-11-01 DIAGNOSIS — R07.89 CHEST PAIN RADIATING TO UPPER EXTREMITY: ICD-10-CM

## 2017-11-01 DIAGNOSIS — R00.0 TACHYCARDIA: ICD-10-CM

## 2017-11-03 ENCOUNTER — HOSPITAL ENCOUNTER (OUTPATIENT)
Facility: MEDICAL CENTER | Age: 27
End: 2017-11-03
Attending: NURSE PRACTITIONER
Payer: MEDICARE

## 2017-11-03 ENCOUNTER — NON-PROVIDER VISIT (OUTPATIENT)
Dept: WOUND CARE | Facility: MEDICAL CENTER | Age: 27
End: 2017-11-03
Attending: FAMILY MEDICINE
Payer: MEDICARE

## 2017-11-03 DIAGNOSIS — S21.002D OPEN WOUND OF LEFT BREAST, SUBSEQUENT ENCOUNTER: ICD-10-CM

## 2017-11-03 LAB
GRAM STN SPEC: NORMAL
SIGNIFICANT IND 70042: NORMAL
SITE SITE: NORMAL
SOURCE SOURCE: NORMAL

## 2017-11-03 PROCEDURE — 97602 WOUND(S) CARE NON-SELECTIVE: CPT

## 2017-11-03 PROCEDURE — 87070 CULTURE OTHR SPECIMN AEROBIC: CPT

## 2017-11-03 PROCEDURE — 87205 SMEAR GRAM STAIN: CPT

## 2017-11-06 ENCOUNTER — OFFICE VISIT (OUTPATIENT)
Dept: URGENT CARE | Facility: CLINIC | Age: 27
End: 2017-11-06
Payer: MEDICARE

## 2017-11-06 VITALS
RESPIRATION RATE: 14 BRPM | HEART RATE: 74 BPM | WEIGHT: 275 LBS | DIASTOLIC BLOOD PRESSURE: 72 MMHG | BODY MASS INDEX: 45.82 KG/M2 | TEMPERATURE: 98.5 F | SYSTOLIC BLOOD PRESSURE: 120 MMHG | OXYGEN SATURATION: 96 % | HEIGHT: 65 IN

## 2017-11-06 DIAGNOSIS — S21.002A BREAST WOUND, LEFT, INITIAL ENCOUNTER: ICD-10-CM

## 2017-11-06 PROBLEM — S21.009A BREAST WOUND: Status: ACTIVE | Noted: 2017-11-06

## 2017-11-06 PROCEDURE — 99213 OFFICE O/P EST LOW 20 MIN: CPT | Performed by: NURSE PRACTITIONER

## 2017-11-06 ASSESSMENT — ENCOUNTER SYMPTOMS
FEVER: 0
BREAST PAIN: 1
SENSORY CHANGE: 0
BRUISES/BLEEDS EASILY: 0
CHILLS: 0
TINGLING: 0

## 2017-11-07 ENCOUNTER — TELEPHONE (OUTPATIENT)
Dept: CARDIOLOGY | Facility: MEDICAL CENTER | Age: 27
End: 2017-11-07

## 2017-11-07 ENCOUNTER — NON-PROVIDER VISIT (OUTPATIENT)
Dept: WOUND CARE | Facility: MEDICAL CENTER | Age: 27
End: 2017-11-07
Attending: FAMILY MEDICINE
Payer: MEDICARE

## 2017-11-07 PROCEDURE — 0298T PR EXT ECG > 48HR TO 21 DAY REVIEW AND INTERPRETATN: CPT | Performed by: INTERNAL MEDICINE

## 2017-11-07 PROCEDURE — 0296T PR EXT ECG > 48HR TO 21 DAY RCRD W/CONECT INTL RCRD: CPT | Performed by: INTERNAL MEDICINE

## 2017-11-07 PROCEDURE — 97602 WOUND(S) CARE NON-SELECTIVE: CPT

## 2017-11-07 NOTE — TELEPHONE ENCOUNTER
Lm for pt on cell, zio is ok, continue current regimen per JE, lm to call with questions, per JE report shows avg HR 60.

## 2017-11-07 NOTE — PROGRESS NOTES
"Subjective:      Kristin Balderrama is a 27 y.o. female who presents with Breast Pain (l breast bleeding from wound . x 5 weeks .)            Patient presents with:  Breast Pain: l breast bleeding from wound . x 5 weeks .    [facility] reviewed and updated as necessary in EPIC electronic record with patient today.  Medications including OTC medications reviewed with patient.        -- Cefdinir -- Shortness of Breath and Itching    --  Tolerated 1/18/17   -- Depakote (Divalproex Sodium) -- Unspecified    --  Muscle spasms/muscle pain and weakness   -- Abilify -- Unspecified    --  Headaches/muscle twitching   -- Amitriptyline -- Unspecified    --  Headaches   -- Amoxicillin -- Rash    --  Pt states \"I get a rash\".   -- Ciprofloxacin -- Rash    --  Pt states \"I get a rash\".   -- Clindamycin -- Nausea    --  Even with food   -- Ees (Erythromycin) -- Vomiting and Nausea   -- Flagyl (Metronidazole Hcl) -- Unspecified    --  \"eye problems\"   -- Flomax (Tamsulosin Hydrochloride) -- Swelling   -- Metformin -- Unspecified    --  Increased lactic acid   -- Sulfa Drugs -- Hives and Rash    --  RXN=since childhood   -- Tape -- Rash    --  Tears skin off             coban with Tegaderm tape ok             RXN=ongoing   -- Vancomycin -- Itching    --  Pt becomes flushed in face and chest.             RXN=7/10/16   -- Cephalexin (Keflex) -- Rash    --  Pt states she gets a rash with this medication   -- Erythromycin -- Rash    --  .   -- Levofloxacin -- Unspecified    --  Leg muscle cramps   -- Metronidazole -- Rash    --  .   -- Valproic Acid -- Rash    --  .        Breast Pain   This is a chronic (' I need to have my wound cauterized because it is bleeding\" ) problem. The current episode started today (1 hour PTA in urgent care). The problem has been unchanged. Pertinent negatives include no chills or fever. Nothing aggravates the symptoms. Treatments tried: changed dressing. The treatment provided mild relief.       Review of " "Systems   Constitutional: Negative for chills and fever.   Neurological: Negative for tingling and sensory change.   Endo/Heme/Allergies: Does not bruise/bleed easily.          Objective:     /72   Pulse 74   Temp 36.9 °C (98.5 °F)   Resp 14   Ht 1.651 m (5' 5\")   Wt 124.7 kg (275 lb)   LMP 11/06/2015   SpO2 96%   Breastfeeding? No   BMI 45.76 kg/m²      Physical Exam   Constitutional: She appears well-developed and well-nourished.   Skin: Skin is warm and dry.        Nursing note and vitals reviewed.        Pertinent previous visits, Labs & Imaging studies reviewed. (See  Epic chart)  Neg wound culture 11/03/17   She is seen by  wound clinic for left breast wound- ;last visit 11/03*/17  Next appt tomorrow morning.          Assessment/Plan:     1. Breast wound, left, initial encounter       Educated in pressure to control bleeding should it occur again and, cold compresses prn.   Do not change dressing. Allow wound care to change dressing tomorrow.    Hemostasis present at this time.         "

## 2017-11-07 NOTE — TELEPHONE ENCOUNTER
Patient enrolled in the Zio patch program 10/30/2017.      EOS printed and given to the DrEffie Pending signature 11/07/2017.  ( Pt only had the monitor on for 14-hours total. Notes to JE/THEODORE to see if they want the pt to have another monitor done.)

## 2017-11-07 NOTE — WOUND TEAM
"Advanced Wound Care  [facility]  [address]  [address]  [place] [address]  [phone] Fax: [phone]      Encounter Note  For Certification Period: 10/27/17 - 1/17/18      Referring Physician: MD Jose  Primary Physician:        Consulting Physicians:     EDD Starr    Wound(s): L breast  Start of Care: 10/27/17       Subjective:        HPI: Current wound started 4-5 weeks ago. Another one has been ongoing for 6 months. Has tried antibiotic ointments, but they are not healing.  Pt suspects spider bites, but didn't actually see them.              Pain:  7/10          Allergies:   Allergies   Allergen Reactions   • Cefdinir Shortness of Breath and Itching     Tolerated 1/18/17   • Depakote [Divalproex Sodium] Unspecified     Muscle spasms/muscle pain and weakness     • Abilify Unspecified     Headaches/muscle twitching     • Amitriptyline Unspecified     Headaches     • Amoxicillin Rash     Pt states \"I get a rash\".     • Ciprofloxacin Rash     Pt states \"I get a rash\".     • Clindamycin Nausea     Even with food     • Ees [Erythromycin] Vomiting and Nausea   • Flagyl [Metronidazole Hcl] Unspecified     \"eye problems\"     • Flomax [Tamsulosin Hydrochloride] Swelling   • Metformin Unspecified     Increased lactic acid      • Sulfa Drugs Hives and Rash     RXN=since childhood   • Tape Rash     Tears skin off   coban with Tegaderm tape ok  RXN=ongoing   • Vancomycin Itching     Pt becomes flushed in face and chest.   RXN=7/10/16   • Cephalexin [Keflex] Rash     Pt states she gets a rash with this medication   • Erythromycin Rash     .   • Levofloxacin Unspecified     Leg muscle cramps   • Metronidazole Rash     .   • Valproic Acid Rash     .           Objective:      Tests and Measures: 10/27: Culture obtained L breast, negative as of 10/31    Orthotic, protective, supportive devices: R leg brace for foot drop    Fall Risk Assessment (vickie all that apply with an X): Completed " 10/27/2017                 Wound Characteristics                                                    Location: L breast   Initial Evaluation  Date:10/27/17 Encounter Date: 11/07/2017   Tissue Type and %: Pink, viable 100% dark red   Periwound: intact Erythema    Drainage: Scant, ss Moderate serosanguinous   Exposed structures none none   Wound Edges:   open Open, flat   Odor: none mild   S&S of Infection:   none Erythema, increased drainage   Edema: none none   Sensation: intact Intact, tender               Measurements:left breast Initial Evaluation  Date: 10/27/2017 Encounter Date: 11/07/2017   Length (cm) 1 1.0   Width (cm) 1.2 1.8   Depth (cm) 0.2 0.1   Area (cm2) 1.2 1.8cm2   Tract/undermine none none      Wound Characteristics                                                    Location: R breast   Initial Evaluation  Date:10/27/17 Encounter Date: 11/07/2017   Tissue Type and %: Scab 100% moist pink   Periwound: intact intact   Drainage: none Scant serosanguinous    Exposed structures none none   Wound Edges:   closed Open, flat   Odor: none none   S&S of Infection:   none none   Edema: none none   Sensation: intact intact               Measurements: right breast Initial Evaluation  Date: 10/27/2017 Encounter Date: 11/07/2017     Length (cm) 0.5 0.4   Width (cm) 1.7 0.4   Depth (cm) JODI 0.1   Area (cm2) 0.85 0.16cm2   Tract/undermine  None      Procedures:     Debridement :  Non-selective debridement with gauze and cotton tipped applicator.   Cleansed with: Normal saline and gauze                                                                   Periwound protected with: No sting skin prep, moisture barrier cream   Primary dressing: Hydrofera blue   Secondary Dressing: silicone adhesive foam secured with tegaderm on right, 1/2 of superabsorbant pad secured with hypafix tape and tegaderm on left.   Other:      Patient Education: Patient's wound culture came back negative, wound continues to deteriorate, switched  dressing selection to see if a better outcome will come about next wound care appointment.    Professional Collaboration:       Assessment:      Wound etiology: Question of spider bites.    Wound Progress:  Wound measurements are larger in comparison to last on left, right proximal wound resolved, right distal wound has larger measurements.    Rationale for Treatment: Debride, provide moist wound environment, manage bioburden.    Patient tolerance/compliance: Pt agrees with POC and tolerated treatment well.    Complicating factors: Diabetes, obesity    Need for ongoing Advanced Wound Care services: Pt requires wound monitoring, dressing selection and education.      Plan:      Treatment Plan and Recommendations:  Diagnosis/ICD9: L98.491 Non-healing skin ulcer  L64.9 - Skin lesion of breast    Procedures/CPT:    Frequency: 2X/week      Treatment Goals: STG 2 Weeks  LTG 4 Weeks   Granulation Tissue: 5% 10%   Decrease Necrotic Tissue to: % %   Wound Phase:      Decrease Size by: % %   Periwound:      Decrease tracts/undermining by: % %   Decrease Pain:          At the time of each visit a thorough assessment of the patient is completed to assure the  appropriateness of our plan of care.  The dressings or modalities may need to be adapted   from the original plan to address any significant changes in the wound environment.          Clinician Signature:_______________________________Date__________________      Physician Signature:______________________________Date:__________________

## 2017-11-10 ENCOUNTER — NON-PROVIDER VISIT (OUTPATIENT)
Dept: WOUND CARE | Facility: MEDICAL CENTER | Age: 27
End: 2017-11-10
Attending: FAMILY MEDICINE
Payer: MEDICARE

## 2017-11-10 PROCEDURE — 97602 WOUND(S) CARE NON-SELECTIVE: CPT

## 2017-11-10 PROCEDURE — 99212 OFFICE O/P EST SF 10 MIN: CPT

## 2017-11-10 RX ORDER — FLUOXETINE 10 MG/1
CAPSULE ORAL
Qty: 30 CAP | Refills: 2 | Status: SHIPPED | OUTPATIENT
Start: 2017-11-10 | End: 2018-01-23 | Stop reason: SDUPTHER

## 2017-11-10 NOTE — WOUND TEAM
"Advanced Wound Care  [facility]  [address]  [address]  [place] [address]  [phone] Fax: [phone]      Encounter Note  For Certification Period: 10/27/17 - 1/17/18      Referring Physician: MD Jose  Primary Physician:        Consulting Physicians:     EDD Starr    Wound(s): L breast  Start of Care: 10/27/17       Subjective:        HPI: Current wound started 4-5 weeks ago. Another one has been ongoing for 6 months. Has tried antibiotic ointments, but they are not healing.  Pt suspects spider bites, but didn't actually see them.              Pain:  7/10          Allergies:   Allergies   Allergen Reactions   • Cefdinir Shortness of Breath and Itching     Tolerated 1/18/17   • Depakote [Divalproex Sodium] Unspecified     Muscle spasms/muscle pain and weakness     • Abilify Unspecified     Headaches/muscle twitching     • Amitriptyline Unspecified     Headaches     • Amoxicillin Rash     Pt states \"I get a rash\".     • Ciprofloxacin Rash     Pt states \"I get a rash\".     • Clindamycin Nausea     Even with food     • Ees [Erythromycin] Vomiting and Nausea   • Flagyl [Metronidazole Hcl] Unspecified     \"eye problems\"     • Flomax [Tamsulosin Hydrochloride] Swelling   • Metformin Unspecified     Increased lactic acid      • Sulfa Drugs Hives and Rash     RXN=since childhood   • Tape Rash     Tears skin off   coban with Tegaderm tape ok  RXN=ongoing   • Vancomycin Itching     Pt becomes flushed in face and chest.   RXN=7/10/16   • Cephalexin [Keflex] Rash     Pt states she gets a rash with this medication   • Erythromycin Rash     .   • Levofloxacin Unspecified     Leg muscle cramps   • Metronidazole Rash     .   • Valproic Acid Rash     .           Objective:      Tests and Measures: 10/27: Culture obtained L breast, negative as of 10/31    Orthotic, protective, supportive devices: R leg brace for foot drop    Fall Risk Assessment (vickie all that apply with an X): Completed " 10/27/2017                 Wound Characteristics                                                    Location: L breast   Initial Evaluation  Date:10/27/17 Encounter Date: 11/07/2017   Tissue Type and %: Pink, viable 100% red with granulation buds   Periwound: intact intact   Drainage: Scant, ss scant   Exposed structures none none   Wound Edges:   open Open   Odor: none none   S&S of Infection:   none none   Edema: none none   Sensation: intact Intact, tender               Measurements:left breast Initial Evaluation  Date: 10/27/2017 Encounter Date: 11/07/2017   Length (cm) 1 1.0   Width (cm) 1.2 1.8   Depth (cm) 0.2 0.1   Area (cm2) 1.2 1.8cm2   Tract/undermine none none      Wound Characteristics                                                    Location: R breast   Initial Evaluation  Date:10/27/17 Encounter Date: 11/10/2017   Tissue Type and %: Scab 100% new epithelize tissue   Periwound: intact Intact, fragile   Drainage: none none   Exposed structures none none   Wound Edges:   closed Open   Odor: none none   S&S of Infection:   none none   Edema: none none   Sensation: intact intact               Measurements: right breast Initial Evaluation  Date: 10/27/2017 Encounter Date: 11/07/2017     Length (cm) 0.5 0.4   Width (cm) 1.7 0.4   Depth (cm) JODI 0.1   Area (cm2) 0.85 0.16cm2   Tract/undermine  None      Procedures:     Debridement :  Non-selective debridement with gauze and cotton tipped applicator to left breast.   Cleansed with: NS, gauze                                                                    Periwound protected with: No sting skin prep   Primary dressing: Hydrofera blue to left breast   Secondary Dressing: tegaderm to all.    Other: none     Patient Education: educated on wound status and POC. Right breast would resolved but very fragile; covered with hypafix to be left in place for up to 7 days. Educated pt that it will start to peel back at the edges and just to leave it alone. Left breast  wound also covered with tegarderm; this is per pt request so she can shower and dressing to remain CDI. pt instr ss infection - erythema, edema, localized heat, fever/chills/N+V, when to call MD/go to ER. Pt with good understanding of all.     Patient's wound culture came back negative, wound continues to deteriorate, switched dressing selection to see if a better outcome will come about next wound care appointment.    Professional Collaboration:       Assessment:      Wound etiology: Question of spider bites.    Wound Progress:  Wound measurements are larger in comparison to last on left, right proximal wound resolved, right distal wound has larger measurements.    Rationale for Treatment: Debride, provide moist wound environment, manage bioburden.    Patient tolerance/compliance: Pt agrees with POC and tolerated treatment well.    Complicating factors: Diabetes, obesity    Need for ongoing Advanced Wound Care services: Pt requires wound monitoring, dressing selection and education.      Plan:      Treatment Plan and Recommendations:  Diagnosis/ICD9: L98.491 Non-healing skin ulcer  L64.9 - Skin lesion of breast    Procedures/CPT:    Frequency: 2X/week      Treatment Goals: STG 2 Weeks  LTG 4 Weeks   Granulation Tissue: 5% 10%   Decrease Necrotic Tissue to: % %   Wound Phase:      Decrease Size by: % %   Periwound:      Decrease tracts/undermining by: % %   Decrease Pain:          At the time of each visit a thorough assessment of the patient is completed to assure the  appropriateness of our plan of care.  The dressings or modalities may need to be adapted   from the original plan to address any significant changes in the wound environment.          Clinician Signature:_______________________________Date__________________      Physician Signature:______________________________Date:__________________

## 2017-11-14 ENCOUNTER — NON-PROVIDER VISIT (OUTPATIENT)
Dept: WOUND CARE | Facility: MEDICAL CENTER | Age: 27
End: 2017-11-14
Attending: FAMILY MEDICINE
Payer: MEDICARE

## 2017-11-14 PROCEDURE — 97602 WOUND(S) CARE NON-SELECTIVE: CPT

## 2017-11-14 NOTE — WOUND TEAM
"Advanced Wound Care  [facility]  [address]  [address]  [place] [address]  [phone] Fax: [phone]      Encounter Note  For Certification Period: 10/27/17 - 1/17/18      Referring Physician: MD Jose  Primary Physician:        Consulting Physicians:     EDD Starr    Wound(s): L breast  Start of Care: 10/27/17       Subjective:        HPI: Current wound started 4-5 weeks ago. Another one has been ongoing for 6 months. Has tried antibiotic ointments, but they are not healing.  Pt suspects spider bites, but didn't actually see them.              Pain:  7/10          Allergies:   Allergies   Allergen Reactions   • Cefdinir Shortness of Breath and Itching     Tolerated 1/18/17   • Depakote [Divalproex Sodium] Unspecified     Muscle spasms/muscle pain and weakness     • Abilify Unspecified     Headaches/muscle twitching     • Amitriptyline Unspecified     Headaches     • Amoxicillin Rash     Pt states \"I get a rash\".     • Ciprofloxacin Rash     Pt states \"I get a rash\".     • Clindamycin Nausea     Even with food     • Ees [Erythromycin] Vomiting and Nausea   • Flagyl [Metronidazole Hcl] Unspecified     \"eye problems\"     • Flomax [Tamsulosin Hydrochloride] Swelling   • Metformin Unspecified     Increased lactic acid      • Sulfa Drugs Hives and Rash     RXN=since childhood   • Tape Rash     Tears skin off   coban with Tegaderm tape ok  RXN=ongoing   • Vancomycin Itching     Pt becomes flushed in face and chest.   RXN=7/10/16   • Cephalexin [Keflex] Rash     Pt states she gets a rash with this medication   • Erythromycin Rash     .   • Levofloxacin Unspecified     Leg muscle cramps   • Metronidazole Rash     .   • Valproic Acid Rash     .           Objective:      Tests and Measures: 10/27: Culture obtained L breast, negative as of 10/31    Orthotic, protective, supportive devices: R leg brace for foot drop    Fall Risk Assessment (vickie all that apply with an X): Completed " 10/27/2017                 Wound Characteristics                                                    Location: L breast   Initial Evaluation  Date:10/27/17 Encounter Date: 11/14/2017   Tissue Type and %: Pink, viable 100%  Healthy red    Periwound: intact Intact, scar tissue   Drainage: Scant, ss scant   Exposed structures none none   Wound Edges:   open Open   Odor: none none   S&S of Infection:   none none   Edema: none none   Sensation: intact Intact               Measurements:left breast Initial Evaluation  Date: 10/27/2017 Encounter Date: 11/14/2017   Length (cm) 1 0.2   Width (cm) 1.2 0.7   Depth (cm) 0.2 0.1   Area (cm2) 1.2 0.14 cm2   Tract/undermine none none      Wound Characteristics                                                    Location: R breast (superior/inferior)   Initial Evaluation  Date:10/27/17 Encounter Date: 11/14/2017   Tissue Type and %: Scab 100% dry red scab   Periwound: intact Intact, fragile, scar tissue   Drainage: none none   Exposed structures none none   Wound Edges:   closed Open   Odor: none none   S&S of Infection:   none none   Edema: none none   Sensation: intact intact               Measurements: right breast Initial Evaluation  Date: 10/27/2017 Encounter Date: 11/14/2017      Superior/Inferior   Length (cm) 0.5 0.4 / 0.4   Width (cm) 1.7 0.6 / 0.5   Depth (cm) JODI 0.1 / 0.1   Area (cm2) 0.85 0.24 / 0.20cm2   Tract/undermine  None      Procedures:     Debridement :  Non-selective debridement with gauze and cotton tipped applicator to left and right breast.   Cleansed with: NS, gauze                                                                    Periwound protected with: No sting skin prep   Primary dressing: Hydrofera blue to left breast, secured with ad-foam then tegaderm   Secondary Dressing: Right breast  Honey colloid then ad-foam and tegaderm to cover   Other: none     Patient Education: educated on wound status and POC, Right breast wounds do not look resolved at  this time, very dry and will try honey colloid to soften and encourage epithelialization, patient verbalized understanding. Left breast wound is healing and smaller per measurements.     Patient's wound culture came back negative, wound continues to deteriorate, switched dressing selection to see if a better outcome will come about next wound care appointment.    Professional Collaboration: None      Assessment:      Wound etiology: Question of spider bites.    Wound Progress:  Wound measurements are smaller to left side, larger on right since wounds do not appear resolved at this time.     Rationale for Treatment: Debride, provide moist wound environment, manage bioburden.    Patient tolerance/compliance: Pt agrees with POC and tolerated treatment well.    Complicating factors: Diabetes, obesity    Need for ongoing Advanced Wound Care services: Pt requires wound monitoring, dressing selection and education.      Plan:      Treatment Plan and Recommendations:  Diagnosis/ICD9: L98.491 Non-healing skin ulcer  L64.9 - Skin lesion of breast    Procedures/CPT:    Frequency: 2X/week      Treatment Goals: STG 2 Weeks  LTG 4 Weeks   Granulation Tissue: 5% 10%   Decrease Necrotic Tissue to: % %   Wound Phase:      Decrease Size by: % %   Periwound:      Decrease tracts/undermining by: % %   Decrease Pain:          At the time of each visit a thorough assessment of the patient is completed to assure the  appropriateness of our plan of care.  The dressings or modalities may need to be adapted   from the original plan to address any significant changes in the wound environment.          Clinician Signature:_______________________________Date__________________      Physician Signature:______________________________Date:__________________

## 2017-11-17 ENCOUNTER — NON-PROVIDER VISIT (OUTPATIENT)
Dept: WOUND CARE | Facility: MEDICAL CENTER | Age: 27
End: 2017-11-17
Attending: FAMILY MEDICINE
Payer: MEDICARE

## 2017-11-17 PROCEDURE — 97602 WOUND(S) CARE NON-SELECTIVE: CPT

## 2017-11-17 NOTE — WOUND TEAM
"Advanced Wound Care  [facility]  [address]  [address]  [place] [address]  [phone] Fax: [phone]      Encounter Note  For Certification Period: 10/27/17 - 1/17/18      Referring Physician: MD Jose  Primary Physician:        Consulting Physicians:     EDD Starr    Wound(s): L breast  Start of Care: 10/27/17       Subjective:        HPI: Current wound started 4-5 weeks ago. Another one has been ongoing for 6 months. Has tried antibiotic ointments, but they are not healing.  Pt suspects spider bites, but didn't actually see them.              Pain:  7/10          Allergies:   Allergies   Allergen Reactions   • Cefdinir Shortness of Breath and Itching     Tolerated 1/18/17   • Depakote [Divalproex Sodium] Unspecified     Muscle spasms/muscle pain and weakness     • Abilify Unspecified     Headaches/muscle twitching     • Amitriptyline Unspecified     Headaches     • Amoxicillin Rash     Pt states \"I get a rash\".     • Ciprofloxacin Rash     Pt states \"I get a rash\".     • Clindamycin Nausea     Even with food     • Ees [Erythromycin] Vomiting and Nausea   • Flagyl [Metronidazole Hcl] Unspecified     \"eye problems\"     • Flomax [Tamsulosin Hydrochloride] Swelling   • Metformin Unspecified     Increased lactic acid      • Sulfa Drugs Hives and Rash     RXN=since childhood   • Tape Rash     Tears skin off   coban with Tegaderm tape ok  RXN=ongoing   • Vancomycin Itching     Pt becomes flushed in face and chest.   RXN=7/10/16   • Cephalexin [Keflex] Rash     Pt states she gets a rash with this medication   • Erythromycin Rash     .   • Levofloxacin Unspecified     Leg muscle cramps   • Metronidazole Rash     .   • Valproic Acid Rash     .           Objective:      Tests and Measures: 10/27: Culture obtained L breast, negative as of 10/31    Orthotic, protective, supportive devices: R leg brace for foot drop    Fall Risk Assessment (vickie all that apply with an X): Completed " 10/27/2017                 Wound Characteristics                                                    Location: L breast   Initial Evaluation  Date:10/27/17 Encounter Date: 11/17/2017   Tissue Type and %: Pink, viable 100%  Healthy red    Periwound: intact Intact, fragile,  scar tissue   Drainage: Scant, ss scant   Exposed structures none none   Wound Edges:   open Open   Odor: none none   S&S of Infection:   none none   Edema: none none   Sensation: intact Intact               Measurements:left breast Initial Evaluation  Date: 10/27/2017 Encounter Date: 11/14/2017   Length (cm) 1 0.2   Width (cm) 1.2 0.7   Depth (cm) 0.2 0.1   Area (cm2) 1.2 0.14 cm2   Tract/undermine none none      Wound Characteristics                                                    Location: R breast (superior/inferior)   Initial Evaluation  Date:10/27/17 Encounter Date: 11/17/2017   Tissue Type and %: Scab s:100% red viable tissue  I: 100% yellow slough   Periwound: intact Intact, fragile, scar tissue   Drainage: none none   Exposed structures none none   Wound Edges:   closed Open   Odor: none none   S&S of Infection:   none none   Edema: none none   Sensation: intact intact               Measurements: right breast Initial Evaluation  Date: 10/27/2017 Encounter Date: 11/14/2017      Superior/Inferior   Length (cm) 0.5 0.4 / 0.4   Width (cm) 1.7 0.6 / 0.5   Depth (cm) JODI 0.1 / 0.1   Area (cm2) 0.85 0.24 / 0.20cm2   Tract/undermine  None      Procedures:     Debridement :  Non-selective debridement with gauze to left and right breast.   Cleansed with: NS, gauze                                                                    Periwound protected with: No sting skin prep   Primary dressing: hydrogel, Honey colloid to R inferior breast   Secondary Dressing:ad-foam    Other: none     Patient Education: educated on wound status and POC, Right breast wounds no longer resolved and are larger, inferior very dry slough and will try honey colloid to  soften and encourage epithelialization, superior right and l breast wounds with healthy tissue but dry; added hydrogel. L breast wound small pinhole opening with fragile new epithial tissue. Avoiding tegaderm for time being and asked pt to keep CDI until next visit. Suggested bath instead of shower and wearing tank top or shirt to bed because she states she rolls around a lot at night so they get rubbed off.  patient verbalized agreement and understanding. Left breast wound is healing and smaller per measurements.       Professional Collaboration: None      Assessment:      Wound etiology: Question of spider bites.    Wound Progress:  Wound measurements are smaller to left side, larger on right since wounds do not appear resolved at this time.     Rationale for Treatment: Debride, provide moist wound environment, manage bioburden.    Patient tolerance/compliance: Pt agrees with POC and tolerated treatment well.    Complicating factors: Diabetes, obesity    Need for ongoing Advanced Wound Care services: Pt requires wound monitoring, dressing selection and education.      Plan:      Treatment Plan and Recommendations:  Diagnosis/ICD9: L98.491 Non-healing skin ulcer  L64.9 - Skin lesion of breast    Procedures/CPT:    Frequency: 2X/week      Treatment Goals: STG 2 Weeks  LTG 4 Weeks   Granulation Tissue: 5% 10%   Decrease Necrotic Tissue to: % %   Wound Phase:      Decrease Size by: % %   Periwound:      Decrease tracts/undermining by: % %   Decrease Pain:          At the time of each visit a thorough assessment of the patient is completed to assure the  appropriateness of our plan of care.  The dressings or modalities may need to be adapted   from the original plan to address any significant changes in the wound environment.          Clinician Signature:_______________________________Date__________________      Physician Signature:______________________________Date:__________________

## 2017-11-21 ENCOUNTER — NON-PROVIDER VISIT (OUTPATIENT)
Dept: WOUND CARE | Facility: MEDICAL CENTER | Age: 27
End: 2017-11-21
Attending: FAMILY MEDICINE
Payer: MEDICARE

## 2017-11-21 NOTE — WOUND TEAM
I called patient when she did not show up for her appointment today and she says that she did call to cancel and left a voicemail yesterday, she also cannot come in for her appointment this Friday but will be back next week.

## 2017-11-22 ENCOUNTER — HOSPITAL ENCOUNTER (OUTPATIENT)
Facility: MEDICAL CENTER | Age: 27
End: 2017-11-22
Attending: NURSE PRACTITIONER
Payer: MEDICARE

## 2017-11-22 ENCOUNTER — OFFICE VISIT (OUTPATIENT)
Dept: MEDICAL GROUP | Facility: CLINIC | Age: 27
End: 2017-11-22
Payer: MEDICARE

## 2017-11-22 VITALS
TEMPERATURE: 98.2 F | OXYGEN SATURATION: 94 % | DIASTOLIC BLOOD PRESSURE: 80 MMHG | HEART RATE: 73 BPM | SYSTOLIC BLOOD PRESSURE: 128 MMHG

## 2017-11-22 DIAGNOSIS — R39.9 UTI SYMPTOMS: ICD-10-CM

## 2017-11-22 LAB
APPEARANCE UR: CLEAR
BILIRUB UR STRIP-MCNC: NORMAL MG/DL
COLOR UR AUTO: NORMAL
GLUCOSE UR STRIP.AUTO-MCNC: NORMAL MG/DL
INT CON NEG: NEGATIVE
INT CON POS: POSITIVE
KETONES UR STRIP.AUTO-MCNC: NORMAL MG/DL
LEUKOCYTE ESTERASE UR QL STRIP.AUTO: NORMAL
NITRITE UR QL STRIP.AUTO: NORMAL
PH UR STRIP.AUTO: 6 [PH] (ref 5–8)
POC URINE PREGNANCY TEST: NEGATIVE
PROT UR QL STRIP: NORMAL MG/DL
RBC UR QL AUTO: NORMAL
SP GR UR STRIP.AUTO: 1.03
UROBILINOGEN UR STRIP-MCNC: NORMAL MG/DL

## 2017-11-22 PROCEDURE — 81025 URINE PREGNANCY TEST: CPT | Performed by: NURSE PRACTITIONER

## 2017-11-22 PROCEDURE — 99212 OFFICE O/P EST SF 10 MIN: CPT | Performed by: NURSE PRACTITIONER

## 2017-11-22 PROCEDURE — 81002 URINALYSIS NONAUTO W/O SCOPE: CPT | Performed by: NURSE PRACTITIONER

## 2017-11-22 PROCEDURE — 87086 URINE CULTURE/COLONY COUNT: CPT

## 2017-11-22 RX ORDER — NITROFURANTOIN 25; 75 MG/1; MG/1
100 CAPSULE ORAL 2 TIMES DAILY
Qty: 20 CAP | Refills: 0 | Status: SHIPPED | OUTPATIENT
Start: 2017-11-22 | End: 2017-11-27

## 2017-11-22 ASSESSMENT — ENCOUNTER SYMPTOMS
VOMITING: 0
FLANK PAIN: 1
CHILLS: 0
NAUSEA: 0
FEVER: 0

## 2017-11-22 ASSESSMENT — PATIENT HEALTH QUESTIONNAIRE - PHQ9: CLINICAL INTERPRETATION OF PHQ2 SCORE: 0

## 2017-11-22 NOTE — PROGRESS NOTES
Chief Complaint   Patient presents with   • UTI     Poss. UTI       HISTORY OF PRESENT ILLNESS: Patient is a 27 y.o. female established patient who presents today due to UTI symptoms for one day. Pt reports that she was using Mariajuana so she has been feeling very good until 12 hours ago that she started feeling burning sensation and urinary frequency, also right flank pain. She has no fever, no chills. She is not sure how long has she had symptoms because she won't know it if she is on Mariajuana. She only noticed the symptoms because she has not used Mariajuana in the past 12 hours.     She used to have suprapubic urine catheter which is removed few months ago and ever since then she has no UTI per pt.       Patient Active Problem List    Diagnosis Date Noted   • Sinus tachycardia 10/31/2013     Priority: High   • Chronic inflammatory arthritis 05/23/2016     Priority: Medium   • Depression 10/28/2016     Priority: Low   • Schizophrenia (CMS-HCC) 10/27/2016     Priority: Low   • Psychosis, schizophrenia, simple ([facility]) 09/29/2016     Priority: Low     Class: Chronic   • TONYA on CPAP 03/07/2016     Priority: Low   • Acquired hypothyroidism 11/23/2015     Priority: Low   • PCOS (polycystic ovarian syndrome) 11/23/2015     Priority: Low   • Progressive focal motor weakness 06/28/2015     Priority: Low   • Fatty liver disease, nonalcoholic 01/19/2015     Priority: Low   • Anxiety 12/16/2014     Priority: Low   • Knee pain, right 02/13/2014     Priority: Low   • Neurogenic bladder 04/02/2011     Priority: Low   • Chronic UTI 09/18/2010     Priority: Low   • Borderline personality disorder in adult 09/18/2010     Priority: Low   • Breast wound 11/06/2017   • Morbid obesity with BMI of 45.0-49.9, adult ([facility]) 10/24/2017   • Intractable episodic cluster headache 09/14/2017   • Accidental overdose 08/04/2017   • Rash 06/09/2017   • Hashimoto's encephalopathy 05/17/2017   • Fall at home 05/13/2017   • Type 2 diabetes mellitus  without complication, without long-term current use of insulin (CMS-HCC) 04/26/2017   • On home oxygen therapy 04/15/2017   • Chest pain 03/30/2017   • Weakness of right upper extremity 02/23/2017   • Chronic suprapubic catheter (CMS-HCC) 02/16/2017   • Hypovitaminosis D 11/29/2016   • HTN (hypertension) 11/01/2016   • Chronic pain syndrome 10/27/2016   • Bowel and bladder incontinence 10/27/2016   • Galactorrhea 07/22/2016   • Elevated sedimentation rate 06/27/2016   • Weakness of both lower extremities 06/22/2016   • Vitamin D deficiency 05/21/2016   • Morbidly obese (CMS-HCC) 03/07/2016   • Scoliosis 03/07/2016   • GERD (gastroesophageal reflux disease) 03/07/2016   • Peripheral neuropathy (CMS-HCC) 03/06/2016   • H/O prior ablation treatment 02/10/2016       Allergies:Cefdinir; Depakote [divalproex sodium]; Abilify; Amitriptyline; Amoxicillin; Ciprofloxacin; Clindamycin; Ees [erythromycin]; Flagyl [metronidazole hcl]; Flomax [tamsulosin hydrochloride]; Metformin; Sulfa drugs; Tape; Vancomycin; Cephalexin [keflex]; Erythromycin; Levofloxacin; Metronidazole; and Valproic acid    Current Outpatient Prescriptions   Medication Sig Dispense Refill   • nitrofurantoin monohydr macro (MACROBID) 100 MG Cap Take 1 Cap by mouth 2 times a day for 5 days. 20 Cap 0   • fluoxetine (PROZAC) 10 MG Cap TAKE ONE CAPSULE BY MOUTH ONCE A DAY 30 Cap 2   • nitroglycerin (NITROSTAT) 0.4 MG SL Tab Place 1 Tab under tongue as needed for Chest Pain. 25 Tab 0   • ivabradine (CORLANOR) 5 MG Tab tablet Take 1 Tab by mouth 2 times a day, with meals. 60 Tab 2   • hydrOXYzine pamoate (VISTARIL) 25 MG Cap Take 1 Cap by mouth 3 times a day as needed for Other (May cause sedation). 30 Cap 3   • chlorhexidine (HIBICLENS) 4 % liquid Rinse the area with water. Apply the minimum amount of HIBICLENS necessary to cover the skin area and wash gently. Rinse thoroughly 240 mL 1   • sumatriptan (IMITREX) 20 MG/ACT nasal spray Spray 1 Spray in nose as needed  for Migraine. 10 Each 11   • neomycin-polymyxin-HC (PEDIOTIC HC) 3.5-22514-2 Suspension Place 4 Drops in ear 3 times a day. 1 Bottle 0   • silver sulfADIAZINE (SILVADENE) 1 % Cream Apply small amount to affected area daily until healed, loosely cover with bandage 20 g 0   • ziprasidone (GEODON) 80 MG Cap Take 1 Cap by mouth 2 Times a Day. 60 Cap 2   • fluticasone (FLONASE) 50 MCG/ACT nasal spray Spray 2 Sprays in nose every day. 16 g 11   • loratadine (CLARITIN) 10 MG Tab Take 1 Tab by mouth every day. 30 Tab    • oxycodone-acetaminophen (PERCOCET-10)  MG Tab Take 2 Tabs by mouth 2 Times a Day. Indications: Pain     • sitagliptin (JANUVIA) 100 MG Tab Take 1 Tab by mouth every day. 30 Tab 6   • GRALISE 600 MG Tab Take 600 mg by mouth 3 times a day.     • nitroGLYCERIN (NITROSTAT) 0.3 MG SL tablet PLACE 1 TABLET UNDER TONGUE IF NEEDED FOR CHEST PAIN EVERY 5 MINUTES FOR 3 DOSES AS DIRECTED  0   • Mirabegron ER (MYRBETRIQ) 25 MG TABLET SR 24 HR Take 1 Tab by mouth every day.     • lactobacillus granules (LACTINEX/FLORANEX) Pack Take 1 Packet by mouth 3 times a day, with meals.     • levothyroxine (SYNTHROID) 75 MCG Tab Take 75 mcg by mouth Every morning on an empty stomach.     • lactulose 10 GM/15ML Solution Take 10 g by mouth 2 times a day as needed (For constipation).     • furosemide (LASIX) 40 MG Tab Take 40 mg by mouth every day.     • promethazine (PHENERGAN) 25 MG Tab Take 1 Tab by mouth every 6 hours as needed for Nausea/Vomiting. 30 Tab 6   • aspirin EC (ECOTRIN) 81 MG Tablet Delayed Response Take 1 Tab by mouth every day. 30 Tab 6   • baclofen (LIORESAL) 10 MG Tab Take 1 Tab by mouth 3 times a day. 90 Tab 6   • Melatonin 5 MG Tab Take 10 mg by mouth every bedtime.     • fentanyl (DURAGESIC) 25 MCG/HR PATCH 72 HR Apply 1 Patch to skin as directed every 72 hours.     • vitamin D, Ergocalciferol, (DRISDOL) 71461 UNITS Cap capsule Take 50,000 Units by mouth every Friday.     • sodium bicarbonate 325 MG Tab  Take 2 Tabs by mouth 3 times a day.       No current facility-administered medications for this visit.        Social History   Substance Use Topics   • Smoking status: Never Smoker   • Smokeless tobacco: Never Used   • Alcohol use No       Family Status   Relation Status   • Mother Alive    44 2016   • Maternal Uncle Alive   • Maternal Grandmother Alive   • Father Alive    bio father hx unknown   • Sister Alive   • Sister      Family History   Problem Relation Age of Onset   • Hypertension Mother    • Sleep Apnea Mother    • Heart Disease Mother    • Other Mother      hypothryod   • Hypertension Maternal Uncle    • Heart Disease Maternal Grandmother    • Hypertension Maternal Grandmother    • Other Sister      Narcolepsy;fibromyalsia;bone;nerve   • Genitourinary () Sister      endometriosis         ROS:  Review of Systems   Constitutional: Negative for chills and fever.   Gastrointestinal: Negative for nausea and vomiting.   Genitourinary: Positive for dysuria, flank pain, frequency and urgency. Negative for hematuria.        Objective:     Blood pressure 128/80, pulse 73, temperature 36.8 °C (98.2 °F), SpO2 94 %.     Physical Exam:  Physical Exam   Constitutional: She is oriented to person, place, and time and well-developed, well-nourished, and in no distress.   HENT:   Head: Normocephalic and atraumatic.   Eyes: Conjunctivae are normal.   Neck: Neck supple. No JVD present.   Cardiovascular: Normal rate.    Pulmonary/Chest: Effort normal.   Abdominal: Soft. She exhibits no distension. There is no tenderness. There is no rebound.   Right CVA tenderness but no other systemic symptoms   Musculoskeletal: Normal range of motion.   Neurological: She is alert and oriented to person, place, and time.   Vitals reviewed.        Assessment/Plan:  1. UTI symptoms  - POCT Urinalysis: small leukocyte, trace protein  - POCT PREGNANCY: negative  - URINE CULTURE(NEW); Future  - nitrofurantoin monohydr macro (MACROBID) 100 MG  Cap; Take 1 Cap by mouth 2 times a day for 5 days.  Dispense: 20 Cap; Refill: 0  Call if worsening symptoms even with abx.      Differential diagnoses and indications for immediate follow-up discussed with patient.    Instructed to return to clinic or nearest emergency department for any change in condition, further concerns, or worsening of symptoms.    The patient demonstrated a good understanding and agreed with the treatment plan.

## 2017-11-24 ENCOUNTER — APPOINTMENT (OUTPATIENT)
Dept: WOUND CARE | Facility: MEDICAL CENTER | Age: 27
End: 2017-11-24
Attending: FAMILY MEDICINE
Payer: MEDICARE

## 2017-11-24 LAB
BACTERIA UR CULT: NORMAL
SIGNIFICANT IND 70042: NORMAL
SOURCE SOURCE: NORMAL

## 2017-11-27 ENCOUNTER — OFFICE VISIT (OUTPATIENT)
Dept: MEDICAL GROUP | Facility: MEDICAL CENTER | Age: 27
End: 2017-11-27
Payer: MEDICARE

## 2017-11-27 VITALS
DIASTOLIC BLOOD PRESSURE: 70 MMHG | HEIGHT: 65 IN | WEIGHT: 281 LBS | BODY MASS INDEX: 46.82 KG/M2 | HEART RATE: 71 BPM | RESPIRATION RATE: 16 BRPM | TEMPERATURE: 97.2 F | SYSTOLIC BLOOD PRESSURE: 132 MMHG | OXYGEN SATURATION: 95 %

## 2017-11-27 DIAGNOSIS — R13.10 DYSPHAGIA, UNSPECIFIED TYPE: ICD-10-CM

## 2017-11-27 DIAGNOSIS — F51.01 PRIMARY INSOMNIA: ICD-10-CM

## 2017-11-27 PROCEDURE — 99214 OFFICE O/P EST MOD 30 MIN: CPT | Performed by: NURSE PRACTITIONER

## 2017-11-27 RX ORDER — OMEPRAZOLE 20 MG/1
20 CAPSULE, DELAYED RELEASE ORAL DAILY
Qty: 30 CAP | Refills: 5 | Status: SHIPPED | OUTPATIENT
Start: 2017-11-27 | End: 2018-03-15

## 2017-11-27 RX ORDER — ZOLPIDEM TARTRATE 5 MG/1
5 TABLET ORAL NIGHTLY PRN
Qty: 30 TAB | Refills: 1 | Status: SHIPPED | OUTPATIENT
Start: 2017-11-27 | End: 2017-12-04

## 2017-11-28 ENCOUNTER — APPOINTMENT (OUTPATIENT)
Dept: WOUND CARE | Facility: MEDICAL CENTER | Age: 27
End: 2017-11-28
Attending: FAMILY MEDICINE
Payer: MEDICARE

## 2017-11-28 ASSESSMENT — ENCOUNTER SYMPTOMS: INSOMNIA: 1

## 2017-11-28 NOTE — PROGRESS NOTES
Subjective:      Kristin Balderrama is a 27 y.o. female who presents with Other (trouble swallowing)            HPI Kristin Balderrama Is here same day visit for insomnia and throat issues.      1. Dysphagia, unspecified type  Patient reports that for the past few days she has had some difficulty with swallowing. She states it is not sore throat. She thought it might be related to postnasal drip but has been using Flonase nasal spray and antihistamines. She states she did have something similar in the past and there are notes from gastroenterology dated 12/12/16 where patient had a swallowing study which showed just mild esophageal dysmotility. She states she is able to swallow foods and fluids and has no cough. She is not currently on anything for acid reflux.    2. Primary insomnia  Patient also would like to have something to help her sleep at night. She was on chronic pain medication but states she has weaned off them. Her last prescription for narcotics was earlier this month and only for a week supply. She has trouble staying asleep.  Current Outpatient Prescriptions   Medication Sig Dispense Refill   • omeprazole (PRILOSEC) 20 MG delayed-release capsule Take 1 Cap by mouth every day. 30 Cap 5   • zolpidem (AMBIEN) 5 MG Tab Take 1 Tab by mouth at bedtime as needed for Sleep. 30 Tab 1   • fluoxetine (PROZAC) 10 MG Cap TAKE ONE CAPSULE BY MOUTH ONCE A DAY 30 Cap 2   • nitroglycerin (NITROSTAT) 0.4 MG SL Tab Place 1 Tab under tongue as needed for Chest Pain. 25 Tab 0   • ivabradine (CORLANOR) 5 MG Tab tablet Take 1 Tab by mouth 2 times a day, with meals. 60 Tab 2   • hydrOXYzine pamoate (VISTARIL) 25 MG Cap Take 1 Cap by mouth 3 times a day as needed for Other (May cause sedation). 30 Cap 3   • chlorhexidine (HIBICLENS) 4 % liquid Rinse the area with water. Apply the minimum amount of HIBICLENS necessary to cover the skin area and wash gently. Rinse thoroughly 240 mL 1   • sumatriptan (IMITREX) 20 MG/ACT  nasal spray Spray 1 Spray in nose as needed for Migraine. 10 Each 11   • neomycin-polymyxin-HC (PEDIOTIC HC) 3.5-60753-8 Suspension Place 4 Drops in ear 3 times a day. 1 Bottle 0   • silver sulfADIAZINE (SILVADENE) 1 % Cream Apply small amount to affected area daily until healed, loosely cover with bandage 20 g 0   • ziprasidone (GEODON) 80 MG Cap Take 1 Cap by mouth 2 Times a Day. 60 Cap 2   • fluticasone (FLONASE) 50 MCG/ACT nasal spray Spray 2 Sprays in nose every day. 16 g 11   • loratadine (CLARITIN) 10 MG Tab Take 1 Tab by mouth every day. 30 Tab    • sitagliptin (JANUVIA) 100 MG Tab Take 1 Tab by mouth every day. 30 Tab 6   • GRALISE 600 MG Tab Take 600 mg by mouth 3 times a day.     • nitroGLYCERIN (NITROSTAT) 0.3 MG SL tablet PLACE 1 TABLET UNDER TONGUE IF NEEDED FOR CHEST PAIN EVERY 5 MINUTES FOR 3 DOSES AS DIRECTED  0   • Mirabegron ER (MYRBETRIQ) 25 MG TABLET SR 24 HR Take 1 Tab by mouth every day.     • lactobacillus granules (LACTINEX/FLORANEX) Pack Take 1 Packet by mouth 3 times a day, with meals.     • levothyroxine (SYNTHROID) 75 MCG Tab Take 75 mcg by mouth Every morning on an empty stomach.     • lactulose 10 GM/15ML Solution Take 10 g by mouth 2 times a day as needed (For constipation).     • furosemide (LASIX) 40 MG Tab Take 40 mg by mouth every day.     • promethazine (PHENERGAN) 25 MG Tab Take 1 Tab by mouth every 6 hours as needed for Nausea/Vomiting. 30 Tab 6   • aspirin EC (ECOTRIN) 81 MG Tablet Delayed Response Take 1 Tab by mouth every day. 30 Tab 6   • baclofen (LIORESAL) 10 MG Tab Take 1 Tab by mouth 3 times a day. 90 Tab 6   • Melatonin 5 MG Tab Take 10 mg by mouth every bedtime.     • fentanyl (DURAGESIC) 25 MCG/HR PATCH 72 HR Apply 1 Patch to skin as directed every 72 hours.     • vitamin D, Ergocalciferol, (DRISDOL) 08954 UNITS Cap capsule Take 50,000 Units by mouth every Friday.     • sodium bicarbonate 325 MG Tab Take 2 Tabs by mouth 3 times a day.     • oxycodone-acetaminophen  "(PERCOCET-10)  MG Tab Take 2 Tabs by mouth 2 Times a Day. Indications: Pain       No current facility-administered medications for this visit.      Social History   Substance Use Topics   • Smoking status: Never Smoker   • Smokeless tobacco: Never Used   • Alcohol use No     Family History   Problem Relation Age of Onset   • Hypertension Mother    • Sleep Apnea Mother    • Heart Disease Mother    • Other Mother      hypothryod   • Hypertension Maternal Uncle    • Heart Disease Maternal Grandmother    • Hypertension Maternal Grandmother    • Other Sister      Narcolepsy;fibromyalsia;bone;nerve   • Genitourinary () Sister      endometriosis     Past Medical History:   Diagnosis Date   • Anginal syndrome     random chest pain especially with tachycardia   • Arrhythmia     \"sinus tachycardia\", cariologist, Dr. Kumar; ablation 2/2016   • Arthritis     osteo   • ASTHMA     when around smoke   • Borderline personality disorder    • Breath shortness     with tachycardia   • Chronic UTI 9/18/2010   • Disorder of thyroid    • Fall    • Gynecological disorder     PCOS   • Headache(784.0)    • Heart burn    • History of falling    • Hypertension    • Migraine    • 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CMS-HCC)    • Multiple personality disorder    • Obesity    • Pain 08-15-12    back, 7/10   • PCOS (polycystic ovarian syndrome)    • Pneumonia 2012   • Psychosis    • Renal disorder     \"kidney disease, stage 1\" nephrologist, Dr. 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   • Urinary bladder disorder     Suprapubic cath   • Urinary incontinence        Review of Systems   Psychiatric/Behavioral: The patient has insomnia.    All other systems reviewed and are negative.         Objective:     /70   Pulse 71   Temp 36.2 °C (97.2 °F)   Resp 16   Ht 1.651 m (5' 5\")   Wt (!) 127.5 kg (281 " lb)   SpO2 95%   BMI 46.76 kg/m²      Physical Exam   Constitutional: She is oriented to person, place, and time. She appears well-developed and well-nourished. No distress.   HENT:   Head: Normocephalic and atraumatic.   Right Ear: External ear normal.   Left Ear: External ear normal.   Nose: Nose normal.   Eyes: Right eye exhibits no discharge. Left eye exhibits no discharge.   Neck: Normal range of motion. Neck supple. No thyromegaly present.   Cardiovascular: Normal rate, regular rhythm and normal heart sounds.  Exam reveals no gallop and no friction rub.    No murmur heard.  Pulmonary/Chest: Effort normal and breath sounds normal. She has no wheezes. She has no rales.   Musculoskeletal: She exhibits no edema or tenderness.   Neurological: She is alert and oriented to person, place, and time. She displays normal reflexes.   Skin: Skin is warm and dry. No rash noted. She is not diaphoretic.   Psychiatric: She has a normal mood and affect. Her behavior is normal. Judgment and thought content normal.   Nursing note and vitals reviewed.              Assessment/Plan:     1. Dysphagia, unspecified type  Patient had a study in the last year showing only mild esophageal dysmotility. She does not feel her throat discomfort is from postnasal drip because she is using antihistamines and steroid nasal sprays but there is the possibility she has some acid reflux cause and will be started on omeprazole. I told her to eat smaller meals and smaller bites and take time chewing and swallowing. If does not improve she needs to get back to gastroenterology.  - omeprazole (PRILOSEC) 20 MG delayed-release capsule; Take 1 Cap by mouth every day.  Dispense: 30 Cap; Refill: 5    2. Primary insomnia  I reviewed with patient the pros and cons of sleep medication including Ambien. I explained that she needs to give herself 8 hour sleep if she plans on using this. She is off other controlled substances. I will leave it to her PCP to  decide whether she should stay on the medicine.  - zolpidem (AMBIEN) 5 MG Tab; Take 1 Tab by mouth at bedtime as needed for Sleep.  Dispense: 30 Tab; Refill: 1

## 2017-11-30 ENCOUNTER — OFFICE VISIT (OUTPATIENT)
Dept: MEDICAL GROUP | Facility: PHYSICIAN GROUP | Age: 27
End: 2017-11-30
Payer: MEDICARE

## 2017-11-30 ENCOUNTER — HOSPITAL ENCOUNTER (OUTPATIENT)
Facility: MEDICAL CENTER | Age: 27
End: 2017-11-30
Attending: FAMILY MEDICINE
Payer: MEDICARE

## 2017-11-30 ENCOUNTER — TELEPHONE (OUTPATIENT)
Dept: MEDICAL GROUP | Facility: MEDICAL CENTER | Age: 27
End: 2017-11-30

## 2017-11-30 VITALS
HEART RATE: 74 BPM | WEIGHT: 275.8 LBS | BODY MASS INDEX: 45.95 KG/M2 | DIASTOLIC BLOOD PRESSURE: 70 MMHG | TEMPERATURE: 98.9 F | SYSTOLIC BLOOD PRESSURE: 118 MMHG | OXYGEN SATURATION: 94 % | HEIGHT: 65 IN

## 2017-11-30 DIAGNOSIS — S21.002A OPEN WOUND OF LEFT BREAST, INITIAL ENCOUNTER: ICD-10-CM

## 2017-11-30 PROCEDURE — 99000 SPECIMEN HANDLING OFFICE-LAB: CPT | Performed by: FAMILY MEDICINE

## 2017-11-30 PROCEDURE — 87070 CULTURE OTHR SPECIMN AEROBIC: CPT

## 2017-11-30 PROCEDURE — 99213 OFFICE O/P EST LOW 20 MIN: CPT | Performed by: FAMILY MEDICINE

## 2017-11-30 PROCEDURE — 87205 SMEAR GRAM STAIN: CPT

## 2017-11-30 RX ORDER — DOXYCYCLINE HYCLATE 100 MG
100 TABLET ORAL 2 TIMES DAILY
Qty: 20 TAB | Refills: 0 | Status: SHIPPED | OUTPATIENT
Start: 2017-11-30 | End: 2017-12-04

## 2017-11-30 NOTE — TELEPHONE ENCOUNTER
1. Caller Name: Kristin                                         Call Back Number: [phone]      Patient approves a detailed voicemail message: N\A    Patient called and was given an rx from [facility] for zolpidem 5mg. Patient states it is not working and was wondering if she can increase it to 10mg. Please advise.

## 2017-12-01 LAB
GRAM STN SPEC: NORMAL
SIGNIFICANT IND 70042: NORMAL
SITE SITE: NORMAL
SOURCE SOURCE: NORMAL

## 2017-12-01 NOTE — PROGRESS NOTES
"Subjective:      Kristin Balderrama is a 27 y.o. female who presents with Wound Infection (left breast)            HPI     This is a 27-year-old white female patient of Dr. Torres Brody who is here for same-day access for wound in the left breast. She has been going to the wound care center for wounds in both breasts. She said they have already closed and so she did not keep her appointment last week. The left breast wound started to open again about 4 days ago. She is allergic to a lot of antibiotics. She said she is also allergic to tape and adhesives. She denies any fever or chills.    She was just recently seen at urgent care last week for UTI and was treated with Macrodantin for 5 days. Urine culture however came back negative for infection.    She has diabetes mellitus type 2, psychiatric disorder, asthma, hypertension, thyroid problem.    Medications reviewed.    Allergies reviewed.    Social history reviewed.    ROS     Review of systems as per history of present illness, the rest are negative.     Objective:     /70   Pulse 74   Temp 37.2 °C (98.9 °F)   Ht 1.651 m (5' 5\")   Wt (!) 125.1 kg (275 lb 12.7 oz)   SpO2 94%   BMI 45.89 kg/m²      Physical Exam     Exam an awake, alert, oriented, not in distress, morbidly obese    Breasts-shallow ulcer 13 mm in widest diameter left breast with surrounding erythema of the skin total of 3.5 cm with small open area in the center of the ulcer with serosanguineous discharge, 2 healed scabbed ulcers right breast 13 mm in size and 5 mm in size          Assessment/Plan:     1. Open wound of left breast, initial encounter  It looks like there is secondary infection with the left breast ulcer. Culture of the wound was done and specimen was packaged and sent to the lab. Patient has multiple allergies to antibiotics but not to doxycycline. I gave her prescription of doxycycline and she will take this Until we get the culture results. Advised keep the area clean " and dry. Advised to call the wound center so she can go back to start wound care again. She is very hesitant in doing that because she said she is allergic to tapes and adhesives that the wound center use. She has a follow-up appointment with Dr. Brody her PCP next week and she will keep that appointment.  - doxycycline 100 mg by mouth 2 times a day.  Dispense: 20 Tab; Refill: 0  - CULTURE WOUND W/ GRAM STAIN; Future      Please note that this dictation was created using voice recognition software. I have worked with consultants from the vendor as well as technical experts from [facility] to optimize the interface. I have made every reasonable attempt to correct obvious errors, but I expect that there are errors of grammar and possibly content I did not discover before finalizing the note.

## 2017-12-04 ENCOUNTER — NON-PROVIDER VISIT (OUTPATIENT)
Dept: WOUND CARE | Facility: MEDICAL CENTER | Age: 27
End: 2017-12-04
Attending: FAMILY MEDICINE
Payer: MEDICARE

## 2017-12-04 ENCOUNTER — TELEPHONE (OUTPATIENT)
Dept: MEDICAL GROUP | Facility: PHYSICIAN GROUP | Age: 27
End: 2017-12-04

## 2017-12-04 ENCOUNTER — OFFICE VISIT (OUTPATIENT)
Dept: MEDICAL GROUP | Facility: MEDICAL CENTER | Age: 27
End: 2017-12-04
Payer: MEDICARE

## 2017-12-04 VITALS
HEIGHT: 65 IN | BODY MASS INDEX: 46.15 KG/M2 | WEIGHT: 277 LBS | TEMPERATURE: 98.4 F | SYSTOLIC BLOOD PRESSURE: 112 MMHG | DIASTOLIC BLOOD PRESSURE: 82 MMHG | HEART RATE: 82 BPM | RESPIRATION RATE: 16 BRPM | OXYGEN SATURATION: 93 %

## 2017-12-04 DIAGNOSIS — R11.0 NAUSEA: ICD-10-CM

## 2017-12-04 DIAGNOSIS — N64.4 SORENESS BREAST: ICD-10-CM

## 2017-12-04 DIAGNOSIS — F51.01 PRIMARY INSOMNIA: ICD-10-CM

## 2017-12-04 PROBLEM — T50.901A ACCIDENTAL OVERDOSE: Status: RESOLVED | Noted: 2017-08-04 | Resolved: 2017-12-04

## 2017-12-04 PROCEDURE — 99214 OFFICE O/P EST MOD 30 MIN: CPT | Performed by: INTERNAL MEDICINE

## 2017-12-04 PROCEDURE — 99212 OFFICE O/P EST SF 10 MIN: CPT

## 2017-12-04 RX ORDER — ONDANSETRON 4 MG/1
4 TABLET, ORALLY DISINTEGRATING ORAL EVERY 6 HOURS PRN
Qty: 10 TAB | Refills: 0 | Status: SHIPPED | OUTPATIENT
Start: 2017-12-04 | End: 2018-01-05

## 2017-12-04 RX ORDER — TRAZODONE HYDROCHLORIDE 50 MG/1
50 TABLET ORAL
Qty: 30 TAB | Refills: 11 | Status: SHIPPED | OUTPATIENT
Start: 2017-12-04 | End: 2018-03-15

## 2017-12-04 RX ORDER — OXYCODONE HYDROCHLORIDE 5 MG/1
5-30 CAPSULE ORAL EVERY 4 HOURS PRN
COMMUNITY
End: 2017-12-22

## 2017-12-04 NOTE — TELEPHONE ENCOUNTER
----- Message from Kisha Londono M.D. sent at 12/4/2017  6:39 AM PST -----  Wound culture came back rare growth of mixed skin bacteria. Finish the antibiotics. Keep appointment with Dr. Brody.

## 2017-12-04 NOTE — TELEPHONE ENCOUNTER
Called Alfonso with results and she stated that she could not finish antibiotics as it made her sick at her stomach libby with food and Diarrhea

## 2017-12-04 NOTE — TELEPHONE ENCOUNTER
VOICEMAIL  1. Caller Name: Kristin Balderrama                        Call Back Number: [phone] (home)       2. Message: Called and left patient a message to call back to get lab results. LM     3. Patient approves office to leave a detailed voicemail/MyChart message: N\A

## 2017-12-04 NOTE — PROGRESS NOTES
CC: Multiple issues    HPI:   Kristin presents today with the following.    1. Soreness breast  Presents with persistent sore on her left breast. She reports almost completely healed only to reoccur. She declines picking at the area. Recent cultures show no signs of infectious etiology. She denies any fevers or chills.    2. Primary insomnia  Is complaining of difficulty sleeping Ambien was not helpful like another agent. She is still seeing pain management receiving narcotics.    3. Nausea  Is complaining of intermittent nausea is requesting refills medication.      Patient Active Problem List    Diagnosis Date Noted   • Sinus tachycardia 10/31/2013     Priority: High   • Chronic inflammatory arthritis 05/23/2016     Priority: Medium   • Depression 10/28/2016     Priority: Low   • Schizophrenia (CMS-HCC) 10/27/2016     Priority: Low   • Psychosis, schizophrenia, simple (Formerly [facility]) 09/29/2016     Priority: Low     Class: Chronic   • TONYA on CPAP 03/07/2016     Priority: Low   • Acquired hypothyroidism 11/23/2015     Priority: Low   • PCOS (polycystic ovarian syndrome) 11/23/2015     Priority: Low   • Progressive focal motor weakness 06/28/2015     Priority: Low   • Fatty liver disease, nonalcoholic 01/19/2015     Priority: Low   • Anxiety 12/16/2014     Priority: Low   • Knee pain, right 02/13/2014     Priority: Low   • Neurogenic bladder 04/02/2011     Priority: Low   • Chronic UTI 09/18/2010     Priority: Low   • Borderline personality disorder in adult 09/18/2010     Priority: Low   • Breast wound 11/06/2017   • Morbid obesity with BMI of 45.0-49.9, adult (Formerly [facility]) 10/24/2017   • Intractable episodic cluster headache 09/14/2017   • Rash 06/09/2017   • Hashimoto's encephalopathy 05/17/2017   • Fall at home 05/13/2017   • Type 2 diabetes mellitus without complication, without long-term current use of insulin (CMS-HCC) 04/26/2017   • On home oxygen therapy 04/15/2017   • Chest pain 03/30/2017   • Weakness of right upper  extremity 02/23/2017   • Chronic suprapubic catheter (CMS-HCC) 02/16/2017   • Hypovitaminosis D 11/29/2016   • HTN (hypertension) 11/01/2016   • Chronic pain syndrome 10/27/2016   • Bowel and bladder incontinence 10/27/2016   • Galactorrhea 07/22/2016   • Elevated sedimentation rate 06/27/2016   • Weakness of both lower extremities 06/22/2016   • Vitamin D deficiency 05/21/2016   • Morbidly obese (CMS-HCC) 03/07/2016   • Scoliosis 03/07/2016   • GERD (gastroesophageal reflux disease) 03/07/2016   • Peripheral neuropathy (CMS-HCC) 03/06/2016   • H/O prior ablation treatment 02/10/2016       Current Outpatient Prescriptions   Medication Sig Dispense Refill   • oxycodone 5 MG capsule Take 5-30 mg by mouth every four hours as needed.     • trazodone (DESYREL) 50 MG Tab Take 1 Tab by mouth every bedtime. 30 Tab 11   • ondansetron (ZOFRAN ODT) 4 MG TABLET DISPERSIBLE Take 1 Tab by mouth every 6 hours as needed for Nausea. 10 Tab 0   • omeprazole (PRILOSEC) 20 MG delayed-release capsule Take 1 Cap by mouth every day. 30 Cap 5   • fluoxetine (PROZAC) 10 MG Cap TAKE ONE CAPSULE BY MOUTH ONCE A DAY 30 Cap 2   • nitroglycerin (NITROSTAT) 0.4 MG SL Tab Place 1 Tab under tongue as needed for Chest Pain. 25 Tab 0   • ivabradine (CORLANOR) 5 MG Tab tablet Take 1 Tab by mouth 2 times a day, with meals. 60 Tab 2   • chlorhexidine (HIBICLENS) 4 % liquid Rinse the area with water. Apply the minimum amount of HIBICLENS necessary to cover the skin area and wash gently. Rinse thoroughly 240 mL 1   • sumatriptan (IMITREX) 20 MG/ACT nasal spray Spray 1 Spray in nose as needed for Migraine. 10 Each 11   • neomycin-polymyxin-HC (PEDIOTIC HC) 3.5-17194-7 Suspension Place 4 Drops in ear 3 times a day. 1 Bottle 0   • ziprasidone (GEODON) 80 MG Cap Take 1 Cap by mouth 2 Times a Day. 60 Cap 2   • fluticasone (FLONASE) 50 MCG/ACT nasal spray Spray 2 Sprays in nose every day. 16 g 11   • loratadine (CLARITIN) 10 MG Tab Take 1 Tab by mouth every  day. 30 Tab    • sitagliptin (JANUVIA) 100 MG Tab Take 1 Tab by mouth every day. 30 Tab 6   • GRALISE 600 MG Tab Take 600 mg by mouth 3 times a day.     • nitroGLYCERIN (NITROSTAT) 0.3 MG SL tablet PLACE 1 TABLET UNDER TONGUE IF NEEDED FOR CHEST PAIN EVERY 5 MINUTES FOR 3 DOSES AS DIRECTED  0   • Mirabegron ER (MYRBETRIQ) 25 MG TABLET SR 24 HR Take 1 Tab by mouth every day.     • lactobacillus granules (LACTINEX/FLORANEX) Pack Take 1 Packet by mouth 3 times a day, with meals.     • levothyroxine (SYNTHROID) 75 MCG Tab Take 75 mcg by mouth Every morning on an empty stomach.     • lactulose 10 GM/15ML Solution Take 10 g by mouth 2 times a day as needed (For constipation).     • furosemide (LASIX) 40 MG Tab Take 40 mg by mouth every day.     • aspirin EC (ECOTRIN) 81 MG Tablet Delayed Response Take 1 Tab by mouth every day. 30 Tab 6   • baclofen (LIORESAL) 10 MG Tab Take 1 Tab by mouth 3 times a day. 90 Tab 6   • Melatonin 5 MG Tab Take 10 mg by mouth every bedtime.     • sodium bicarbonate 325 MG Tab Take 2 Tabs by mouth 3 times a day.       No current facility-administered medications for this visit.          Allergies as of 12/04/2017 - Reviewed 12/04/2017   Allergen Reaction Noted   • Cefdinir Shortness of Breath and Itching 03/01/2016   • Depakote [divalproex sodium] Unspecified 06/14/2010   • Abilify Unspecified 01/17/2013   • Amitriptyline Unspecified 10/31/2013   • Amoxicillin Rash 09/18/2010   • Ciprofloxacin Rash 12/17/2009   • Clindamycin Nausea 02/02/2011   • Ees [erythromycin] Vomiting and Nausea 08/28/2010   • Flagyl [metronidazole hcl] Unspecified 03/31/2011   • Flomax [tamsulosin hydrochloride] Swelling 09/24/2009   • Metformin Unspecified 07/23/2013   • Sulfa drugs Hives and Rash 09/18/2010   • Tape Rash 08/15/2012   • Vancomycin Itching 07/10/2016   • Cephalexin [keflex] Rash 01/01/2017   • Erythromycin Rash 03/30/2017   • Levofloxacin Unspecified 10/27/2016   • Metronidazole Rash 03/30/2017   •  "Valproic acid Rash 03/30/2017        ROS: As per HPI.    /82   Pulse 82   Temp 36.9 °C (98.4 °F)   Resp 16   Ht 1.651 m (5' 5\")   Wt (!) 125.6 kg (277 lb)   SpO2 93%   BMI 46.10 kg/m²     Physical Exam:  Gen:         Alert and oriented, No apparent distress.  Neck:        No Lymphadenopathy or Bruits.  Lungs:     Clear to auscultation bilaterally  CV:          Regular rate and rhythm. No murmurs, rubs or gallops.               Ext:          No clubbing, cyanosis, edema.      Assessment and Plan.   27 y.o. female with the following issues.    1. Soreness breast  No obvious drainage or sign of infection she'll continue to follow with wound care.    2. Primary insomnia  Giving a trial of trazodone cautioned about side effects    3. Nausea  Refilled Zofran.  - ondansetron (ZOFRAN ODT) 4 MG TABLET DISPERSIBLE; Take 1 Tab by mouth every 6 hours as needed for Nausea.  Dispense: 10 Tab; Refill: 0      "

## 2017-12-05 NOTE — WOUND TEAM
"Advanced Wound Care  [facility]  [address]  [address]  [place] [address]  [phone] Fax: [phone]      Encounter Note  For Certification Period: 10/27/17 - 1/17/18      Referring Physician: MD Jose  Primary Physician:        Consulting Physicians:     EDD Starr    Wound(s): L breast  Start of Care: 10/27/17       Subjective: \"I haven't been able to eat in a week, I tried today and it came right back up. I've been having fever and chills.\"        HPI: Current wound started 4-5 weeks ago. Another one has been ongoing for 6 months. Has tried antibiotic ointments, but they are not healing.  Pt suspects spider bites, but didn't actually see them.              Pain:  8/10          Allergies:   Allergies   Allergen Reactions   • Cefdinir Shortness of Breath and Itching     Tolerated 1/18/17   • Depakote [Divalproex Sodium] Unspecified     Muscle spasms/muscle pain and weakness     • Abilify Unspecified     Headaches/muscle twitching     • Amitriptyline Unspecified     Headaches     • Amoxicillin Rash     Pt states \"I get a rash\".     • Ciprofloxacin Rash     Pt states \"I get a rash\".     • Clindamycin Nausea     Even with food     • Ees [Erythromycin] Vomiting and Nausea   • Flagyl [Metronidazole Hcl] Unspecified     \"eye problems\"     • Flomax [Tamsulosin Hydrochloride] Swelling   • Metformin Unspecified     Increased lactic acid      • Sulfa Drugs Hives and Rash     RXN=since childhood   • Tape Rash     Tears skin off   coban with Tegaderm tape ok  RXN=ongoing   • Vancomycin Itching     Pt becomes flushed in face and chest.   RXN=7/10/16   • Cephalexin [Keflex] Rash     Pt states she gets a rash with this medication   • Erythromycin Rash     .   • Levofloxacin Unspecified     Leg muscle cramps   • Metronidazole Rash     .   • Valproic Acid Rash     .           Objective:      Tests and Measures: 12/4/2017:/87, temp 98.2 (pt states \"normal is 96\"), HR 91 (pt states \"It's " "high, I've had an ablation for sinus tachycardia, it's usually 60\"), SPo2, 94% on RA.       Orthotic, protective, supportive devices: R leg brace for foot drop    Fall Risk Assessment (vickie all that apply with an X): Completed 10/27/2017                 Wound Characteristics                                                    Location: L breast   Initial Evaluation  Date:10/27/17 Encounter Date: 11/17/2017 Encounter Date: 12/4/2017   Tissue Type and %: Pink, viable 100%  Healthy red  100% thin scab   Periwound: intact Intact, fragile,  scar tissue erythematous, tender, scar tissue   Drainage: Scant, ss Scant Min blood and ss per pt \"all over pillows and sheets\"   Exposed structures None None none   Wound Edges:   Open Open flat   Odor: none none none   S&S of Infection:   None None Erythema, vomiting, chills, fever   Edema: None None none   Sensation: intact Intact intact               Measurements:left breast Initial Evaluation  Date: 10/27/2017 Encounter Date: 11/14/2017 Encounter Date: 12/4/2017   Length (cm) 1 0.2 0.8   Width (cm) 1.2 0.7 1.2   Depth (cm) 0.2 0.1 JODI   Area (cm2) 1.2 0.14 cm2 0.96   Tract/undermine none none none      Wound Characteristics                                                    Location: R breast (superior/inferior)   Initial Evaluation  Date:10/27/17 Encounter Date: 11/17/2017 Encounter Date: 12/4/2017   Tissue Type and %: Scab s:100% red viable tissue  I: 100% yellow slough Both thin scab   Periwound: intact Intact, fragile, scar tissue Erythema, tender, scar tissue   Drainage: None None scant   Exposed structures None None none   Wound Edges:   Closed Open open   Odor: None None none   S&S of Infection:   None None Erythema, vomiting, chills, fever   Edema: none none none   Sensation: intact intact intact               Measurements: right breast Initial Evaluation  Date: 10/27/2017 Encounter Date: 11/14/2017      Superior/Inferior Encounter Date: 12/4/2017   Length (cm) 0.5 0.4 / " 0.4 0.5/0.5   Width (cm) 1.7 0.6 / 0.5 0.4/1.2   Depth (cm) JODI 0.1 / 0.1 JODI   Area (cm2) 0.85 0.24 / 0.20cm2 0.2/0.6   Tract/undermine  None none      Procedures:     Debridement :  none   Cleansed with: none                                                                  Periwound protected with:    Primary dressing:ad foam to protect during transport to ER   Secondary    Other: none     Patient Education: Pt stated she has had fever, chills, wounds are draining on sheets, are reddened and redness is spreading. Instructed pt that EDD Bello, stated she she go directly to ER, pt agreed to have [facility] come to transport her as she has no transportation. SONYA Joe, called [facility], paramedics arrived report given: VS, as above, pt's wounds had been nearly healed, now are bigger with erythema spreading, pt has had chills, fever, nausea and vomiting for a week, and a history of being hospitalized for sepsis three times. They verbalized understanding.       Professional Collaboration: EDD Arauz, for POC.       Assessment:      Wound etiology: Question of spider bites.    Wound Progress:  Wounds may be infected, along with chills, fever, nausea, vomiting, recommendation from EDD Bello, that pt go to ER.     Rationale for Treatment: Debride, provide moist wound environment, manage bioburden.    Patient tolerance/compliance: Pt agrees with POC and tolerated treatment well.    Complicating factors: Diabetes, obesity    Need for ongoing Advanced Wound Care services: Pt requires wound monitoring, dressing selection and education.      Plan:      Treatment Plan and Recommendations:  Diagnosis/ICD9: L98.491 Non-healing skin ulcer  L64.9 - Skin lesion of breast    Procedures/CPT:    Frequency: 2X/week      Treatment Goals: STG 2 Weeks  LTG 4 Weeks   Granulation Tissue: 5% 10%   Decrease Necrotic Tissue to: % %   Wound Phase:      Decrease Size by: % %   Periwound:      Decrease tracts/undermining by: % %    Decrease Pain:          At the time of each visit a thorough assessment of the patient is completed to assure the  appropriateness of our plan of care.  The dressings or modalities may need to be adapted   from the original plan to address any significant changes in the wound environment.          Clinician Signature:_______________________________Date__________________      Physician Signature:______________________________Date:__________________

## 2017-12-06 ENCOUNTER — NON-PROVIDER VISIT (OUTPATIENT)
Dept: WOUND CARE | Facility: MEDICAL CENTER | Age: 27
End: 2017-12-06
Attending: FAMILY MEDICINE
Payer: MEDICARE

## 2017-12-06 PROCEDURE — 99212 OFFICE O/P EST SF 10 MIN: CPT

## 2017-12-06 NOTE — WOUND TEAM
"Advanced Wound Care  [facility]  [address]  [address]  [place] [address]  [phone] Fax: [phone]      Encounter Note  For Certification Period: 10/27/17 - 1/17/18      Referring Physician: MD Jose  Primary Physician:        Consulting Physicians:     EDD Starr    Wound(s): L breast  Start of Care: 10/27/17       Subjective: \"I haven't been able to eat in a week, I tried today and it came right back up. I've been having fever and chills.\"        HPI: Current wound started 4-5 weeks ago. Another one has been ongoing for 6 months. Has tried antibiotic ointments, but they are not healing.  Pt suspects spider bites, but didn't actually see them.  12/4/17 to ER for s/sx infection.  Pt reports she was given IV abx and discharged home on oral doxy x7d.             Pain:  5/10          Allergies:   Allergies   Allergen Reactions   • Cefdinir Shortness of Breath and Itching     Tolerated 1/18/17   • Depakote [Divalproex Sodium] Unspecified     Muscle spasms/muscle pain and weakness     • Abilify Unspecified     Headaches/muscle twitching     • Amitriptyline Unspecified     Headaches     • Amoxicillin Rash     Pt states \"I get a rash\".     • Ciprofloxacin Rash     Pt states \"I get a rash\".     • Clindamycin Nausea     Even with food     • Ees [Erythromycin] Vomiting and Nausea   • Flagyl [Metronidazole Hcl] Unspecified     \"eye problems\"     • Flomax [Tamsulosin Hydrochloride] Swelling   • Metformin Unspecified     Increased lactic acid      • Sulfa Drugs Hives and Rash     RXN=since childhood   • Tape Rash     Tears skin off   coban with Tegaderm tape ok  RXN=ongoing   • Vancomycin Itching     Pt becomes flushed in face and chest.   RXN=7/10/16   • Cephalexin [Keflex] Rash     Pt states she gets a rash with this medication   • Erythromycin Rash     .   • Levofloxacin Unspecified     Leg muscle cramps   • Metronidazole Rash     .   • Valproic Acid Rash     .           Objective:  " "    Tests and Measures: 12/4/2017:/87, temp 98.2 (pt states \"normal is 96\"), HR 91 (pt states \"It's high, I've had an ablation for sinus tachycardia, it's usually 60\"), SPo2, 94% on RA.       Orthotic, protective, supportive devices: R leg brace for foot drop    Fall Risk Assessment (vickie all that apply with an X): Completed 10/27/2017                 Wound Characteristics                                                    Location: L breast   Initial Evaluation  Date:10/27/17 Encounter Date: 11/17/2017 Encounter Date: 12/06/2017   Tissue Type and %: Pink, viable 100%  Healthy red  100% thin scab   Periwound: intact Intact, fragile,  scar tissue Mild erythema extending ~1cm in all directions from scab   Drainage: Scant, ss Scant none   Exposed structures None None None   Wound Edges:   Open Open closed   Odor: None None None   S&S of Infection:   None None Mild erythema - on po abx   Edema: None None None   Sensation: intact Intact intact               Measurements:left breast Initial Evaluation  Date: 10/27/2017 Encounter Date: 11/14/2017 Encounter Date: 12/4/2017   Length (cm) 1 0.2 0.8   Width (cm) 1.2 0.7 1.2   Depth (cm) 0.2 0.1 JODI   Area (cm2) 1.2 0.14 cm2 0.96   Tract/undermine none none none      Wound Characteristics                                                    Location: R breast (superior/inferior)   Initial Evaluation  Date:10/27/17 Encounter Date: 11/17/2017 Encounter Date: 12/06/2017   Tissue Type and %: Scab s:100% red viable tissue  I: 100% yellow slough Both thin scab   Periwound: intact Intact, fragile, scar tissue Mild erythema   Drainage: None None none   Exposed structures None None None   Wound Edges:   Closed Open closed   Odor: None None None   S&S of Infection:   None None Mild erythema - on po abx   Edema: none none none   Sensation: intact intact intact               Measurements: right breast Initial Evaluation  Date: 10/27/2017 Encounter Date: " 11/14/2017      Superior/Inferior Encounter Date: 12/4/2017   Length (cm) 0.5 0.4 / 0.4 0.5/0.5   Width (cm) 1.7 0.6 / 0.5 0.4/1.2   Depth (cm) JODI 0.1 / 0.1 JODI   Area (cm2) 0.85 0.24 / 0.20cm2 0.2/0.6   Tract/undermine  None none      Procedures:     Debridement :  none   Cleansed with: NS/gauze                                                                 Periwound protected with:    Primary dressing:left janelle   Secondary    Other: none     Patient Education:   12/06/17: Pt reports she is still experiencing chills at times.  Wounds without obvious s/sx infection: very mild erythema, not hot, no drainage, no edema.  Advised to continue abx as ordered and finish all doses and no dressing required currently.  Pt expresses understanding of instructions.     12/04/17Pt stated she has had fever, chills, wounds are draining on sheets, are reddened and redness is spreading. Instructed pt that EDD Bello, stated she she go directly to ER, pt agreed to have [facility] come to transport her as she has no transportation. SONYA Joe, called [facility], paramedics arrived report given: VS, as above, pt's wounds had been nearly healed, now are bigger with erythema spreading, pt has had chills, fever, nausea and vomiting for a week, and a history of being hospitalized for sepsis three times. They verbalized understanding.       Professional Collaboration: none today      Assessment:      Wound etiology: Question of spider bites.    Wound Progress:  All three sites closed with scabs.    Rationale for Treatment: open to air.  No specific treatment needed.    Patient tolerance/compliance: Pt agrees with POC and tolerated treatment well.    Complicating factors: Diabetes, obesity    Need for ongoing Advanced Wound Care services: Pt requires wound monitoring, dressing selection and education.      Plan:      Treatment Plan and Recommendations:  Diagnosis/ICD9: L98.491 Non-healing skin ulcer  L64.9 - Skin lesion of  breast    Procedures/CPT: level 2 established patient visit    Frequency: 2X/week      Treatment Goals: STG 2 Weeks  LTG 4 Weeks   Granulation Tissue: 5% 10%   Decrease Necrotic Tissue to: % %   Wound Phase:      Decrease Size by: % %   Periwound:      Decrease tracts/undermining by: % %   Decrease Pain:          At the time of each visit a thorough assessment of the patient is completed to assure the  appropriateness of our plan of care.  The dressings or modalities may need to be adapted   from the original plan to address any significant changes in the wound environment.          Clinician Signature:_______________________________Date__________________      Physician Signature:______________________________Date:__________________

## 2017-12-07 RX ORDER — GABAPENTIN 600 MG/1
600 TABLET ORAL 3 TIMES DAILY
Qty: 270 TAB | Refills: 3 | Status: SHIPPED | OUTPATIENT
Start: 2017-12-07 | End: 2018-07-14

## 2017-12-08 ENCOUNTER — OFFICE VISIT (OUTPATIENT)
Dept: URGENT CARE | Facility: CLINIC | Age: 27
End: 2017-12-08
Payer: MEDICARE

## 2017-12-08 ENCOUNTER — HOSPITAL ENCOUNTER (OUTPATIENT)
Facility: MEDICAL CENTER | Age: 27
End: 2017-12-08
Attending: NURSE PRACTITIONER
Payer: MEDICARE

## 2017-12-08 VITALS
HEART RATE: 86 BPM | RESPIRATION RATE: 16 BRPM | WEIGHT: 278 LBS | SYSTOLIC BLOOD PRESSURE: 116 MMHG | OXYGEN SATURATION: 96 % | TEMPERATURE: 98.1 F | DIASTOLIC BLOOD PRESSURE: 78 MMHG | HEIGHT: 65 IN | BODY MASS INDEX: 46.32 KG/M2

## 2017-12-08 DIAGNOSIS — N39.0 URINARY TRACT INFECTION WITH HEMATURIA, SITE UNSPECIFIED: ICD-10-CM

## 2017-12-08 DIAGNOSIS — R31.9 URINARY TRACT INFECTION WITH HEMATURIA, SITE UNSPECIFIED: ICD-10-CM

## 2017-12-08 DIAGNOSIS — J06.9 UPPER RESPIRATORY TRACT INFECTION, UNSPECIFIED TYPE: ICD-10-CM

## 2017-12-08 DIAGNOSIS — R30.0 DYSURIA: ICD-10-CM

## 2017-12-08 LAB
APPEARANCE UR: NORMAL
BILIRUB UR STRIP-MCNC: NORMAL MG/DL
COLOR UR AUTO: YELLOW
GLUCOSE UR STRIP.AUTO-MCNC: NORMAL MG/DL
KETONES UR STRIP.AUTO-MCNC: NORMAL MG/DL
LEUKOCYTE ESTERASE UR QL STRIP.AUTO: NORMAL
NITRITE UR QL STRIP.AUTO: NORMAL
PH UR STRIP.AUTO: 6.5 [PH] (ref 5–8)
PROT UR QL STRIP: NORMAL MG/DL
RBC UR QL AUTO: NORMAL
SP GR UR STRIP.AUTO: 1.03
UROBILINOGEN UR STRIP-MCNC: NORMAL MG/DL

## 2017-12-08 PROCEDURE — 87086 URINE CULTURE/COLONY COUNT: CPT

## 2017-12-08 PROCEDURE — 99214 OFFICE O/P EST MOD 30 MIN: CPT | Performed by: NURSE PRACTITIONER

## 2017-12-08 PROCEDURE — 81002 URINALYSIS NONAUTO W/O SCOPE: CPT | Performed by: NURSE PRACTITIONER

## 2017-12-08 PROCEDURE — 99000 SPECIMEN HANDLING OFFICE-LAB: CPT | Performed by: NURSE PRACTITIONER

## 2017-12-08 RX ORDER — METHYLPREDNISOLONE 4 MG/1
4 TABLET ORAL DAILY
Qty: 1 KIT | Refills: 0 | Status: SHIPPED | OUTPATIENT
Start: 2017-12-08 | End: 2018-01-05

## 2017-12-08 RX ORDER — NITROFURANTOIN 25; 75 MG/1; MG/1
100 CAPSULE ORAL EVERY 12 HOURS
Qty: 10 CAP | Refills: 0 | Status: SHIPPED | OUTPATIENT
Start: 2017-12-08 | End: 2017-12-13

## 2017-12-08 ASSESSMENT — ENCOUNTER SYMPTOMS
SPUTUM PRODUCTION: 0
FATIGUE: 0
FEVER: 0
SHORTNESS OF BREATH: 1
COUGH: 1

## 2017-12-08 NOTE — PROGRESS NOTES
"Subjective:      Kristin Balderrama is a 27 y.o. female who presents with Urinary Frequency (x 1 wk, urinary frequency, burning with urination and lower back pain) and Cough (x 2-3 days, productive cough, shortness of breath and chills)            Urinary Frequency   This is a new problem. Episode onset: reports onset of frequency and burning for the last week. Denies any fever. She reports bilateral lower back pain and not sure if it is related to urine issues. The problem occurs constantly. The problem has been unchanged. Associated symptoms include coughing and urinary symptoms. Pertinent negatives include no fatigue or fever. She has tried nothing for the symptoms.   Cough   This is a new problem. Episode onset: over the last 2-3 days has developed a cough. She reports it hurts to cough and she feels a little short of breath. The problem has been unchanged. The cough is non-productive. Associated symptoms include nasal congestion and shortness of breath. Pertinent negatives include no fever. Associated symptoms comments: She is currently on a one week regimen of doxycycline for an unrelated issue. Denies any fever or production of mucus. Reports she cannot wear her home oxygen because she is congested. She has tried nothing (currently taking ABX for a soft tissue infection) for the symptoms.       Review of Systems   Constitutional: Negative for fatigue, fever and malaise/fatigue.   Respiratory: Positive for cough and shortness of breath. Negative for sputum production.    Genitourinary: Positive for dysuria, frequency and urgency.   All other systems reviewed and are negative.    Past Medical History:   Diagnosis Date   • Anginal syndrome     random chest pain especially with tachycardia   • Arrhythmia     \"sinus tachycardia\", cariologist, Dr. Kumar; ablation 2/2016   • Arthritis     osteo   • ASTHMA     when around smoke   • Borderline personality disorder    • Breath shortness     with tachycardia   • " "Chronic UTI 9/18/2010   • Disorder of thyroid    • Fall    • Gynecological disorder     PCOS   • Headache(784.0)    • Heart burn    • History of falling    • Hypertension    • Migraine    • 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CMS-HCC)    • Multiple personality disorder    • Obesity    • Pain 08-15-12    back, 7/10   • PCOS (polycystic ovarian syndrome)    • Pneumonia 2012   • Psychosis    • Renal disorder     \"kidney disease, stage 1\" nephrologist, Dr. 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   • Urinary bladder disorder     Suprapubic cath   • Urinary incontinence       Past Surgical History:   Procedure Laterality Date   • MUSCLE BIOPSY Right 1/26/2017    Procedure: MUSCLE BIOPSY - THIGH;  Surgeon: Isidro Vigil M.D.;  Location: [facility];  Service:    • GASTROSCOPY WITH BALLOON DILATATION N/A 1/4/2017    Procedure: GASTROSCOPY WITH DILATATION;  Surgeon: Torres Vargas M.D.;  Location: [facility];  Service:    • BOWEL STIMULATOR INSERTION  7/13/2016    Procedure: BOWEL STIMULATOR INSERTION FOR PERMANENT INTERSTIM SACRAL IMPLANT;  Surgeon: Joe Noyola M.D.;  Location: [facility];  Service:    • RECOVERY  1/27/2016    Procedure: CATH LAB EP STUDY WITH SINUS NODE MODIFICATION LA;  Surgeon: [facility];  Location: SURGERY PRE-POST PROC UNIT [facility];  Service:    • OTHER CARDIAC SURGERY  1/2016    cardiac ablation   • NEURO DEST FACET L/S W/IG SNGL  4/21/2015    Performed by Reza Tabor at [facility]   • LUMBAR FUSION ANTERIOR  8/21/2012    Performed by SUSIE SAWANT at [facility]   • ALYSSA BY LAPAROSCOPY  8/29/2010    Performed by SHAYY JOHNS at [facility]   • OTHER ABDOMINAL SURGERY     • TONSILLECTOMY      tonsillectomy      Social History     Social History   • Marital status: Single " "    Spouse name: N/A   • Number of children: N/A   • Years of education: N/A     Occupational History   • Not on file.     Social History Main Topics   • Smoking status: Never Smoker   • Smokeless tobacco: Never Used   • Alcohol use No   • Drug use:      Types: Marijuana   • Sexual activity: Not Currently     Birth control/ protection: Pill     Other Topics Concern   • Not on file     Social History Narrative    ** Merged History Encounter **               Objective:     /78   Pulse 86   Temp 36.7 °C (98.1 °F)   Resp 16   Ht 1.651 m (5' 5\")   Wt (!) 126.1 kg (278 lb)   SpO2 96%   BMI 46.26 kg/m²      Physical Exam   Constitutional: She is oriented to person, place, and time. Vital signs are normal. She appears well-developed and well-nourished.   HENT:   Head: Normocephalic and atraumatic.   Nose: Rhinorrhea present.   Eyes: EOM are normal. Pupils are equal, round, and reactive to light.   Neck: Normal range of motion.   Cardiovascular: Normal rate and regular rhythm.    Pulmonary/Chest: Effort normal. She has rhonchi in the right upper field and the left upper field.   Bronchospastic cough   Abdominal: There is tenderness in the suprapubic area. There is no CVA tenderness.   Musculoskeletal: Normal range of motion.   Neurological: She is alert and oriented to person, place, and time.   Skin: Skin is warm and dry. Capillary refill takes less than 2 seconds.   Psychiatric: She has a normal mood and affect. Her speech is normal and behavior is normal. Thought content normal.   Vitals reviewed.         Lab Results   Component Value Date/Time    POCCOLOR Yellow 12/08/2017 03:30 PM    POCCOLOR Yellow 01/22/2017 06:24 AM    POCAPPEAR Cloudy 12/08/2017 03:30 PM    POCAPPEAR Cloudy (A) 01/22/2017 06:24 AM    POCLEUKEST 2+ 12/08/2017 03:30 PM    POCLEUKEST Trace (A) 01/22/2017 06:24 AM    POCNITRITE neg 12/08/2017 03:30 PM    POCNITRITE Negative 01/22/2017 06:24 AM    POCUROBILIGE neg 12/08/2017 03:30 PM    " POCPROTEIN neg 12/08/2017 03:30 PM    POCPROTEIN Negative 01/22/2017 06:24 AM    POCURPH 6.5 12/08/2017 03:30 PM    POCURPH 6.0 01/22/2017 06:24 AM    POCBLOOD 1+ 12/08/2017 03:30 PM    POCBLOOD Moderate (A) 01/22/2017 06:24 AM    POCSPGRV 1.030 12/08/2017 03:30 PM    POCSPGRV >=1.030 (A) 01/22/2017 06:24 AM    POCKETONES neg 12/08/2017 03:30 PM    POCKETONES Negative 01/22/2017 06:24 AM    POCBILIRUBIN neg 12/08/2017 03:30 PM    POCGLUCUA neg 12/08/2017 03:30 PM    POCGLUCUA 250 (A) 01/22/2017 06:24 AM           Assessment/Plan:     1. Upper respiratory tract infection, unspecified type  - MethylPREDNISolone (MEDROL DOSEPAK) 4 MG Tablet Therapy Pack; Take 1 Tab by mouth every day.  Dispense: 1 Kit; Refill: 0    2. Dysuria  - POCT Urinalysis    3. Urinary tract infection with hematuria, site unspecified  - nitrofurantoin monohydr macro (MACROBID) 100 MG Cap; Take 1 Cap by mouth every 12 hours for 5 days.  Dispense: 10 Cap; Refill: 0  - Urine Culture; Future    Advised pt to continue and finish full course of doxycycline as this will also help with any URI  She has a multitude of co-morbidities and antibiotic allergies. I advised her that if her UTI s/s do not improve she needs to follow up with her primary and potentially a specialist  Otherwise strict ER precautions for any decline in symptoms  Push water intake  Supportive care, differential diagnoses, and indications for immediate follow-up discussed with patient.    Pathogenesis of diagnosis discussed including typical length and natural progression.      Instructed to return to  or nearest emergency department if symptoms fail to improve, for any change in condition, further concerns, or new concerning symptoms.  Patient states understanding of the plan of care and discharge instructions.

## 2017-12-09 DIAGNOSIS — N39.0 URINARY TRACT INFECTION WITH HEMATURIA, SITE UNSPECIFIED: ICD-10-CM

## 2017-12-09 DIAGNOSIS — R31.9 URINARY TRACT INFECTION WITH HEMATURIA, SITE UNSPECIFIED: ICD-10-CM

## 2017-12-11 ENCOUNTER — APPOINTMENT (OUTPATIENT)
Dept: WOUND CARE | Facility: MEDICAL CENTER | Age: 27
End: 2017-12-11
Attending: FAMILY MEDICINE
Payer: MEDICARE

## 2017-12-11 ENCOUNTER — TELEPHONE (OUTPATIENT)
Dept: MEDICAL GROUP | Facility: MEDICAL CENTER | Age: 27
End: 2017-12-11

## 2017-12-11 LAB
BACTERIA UR CULT: NORMAL
SIGNIFICANT IND 70042: NORMAL
SOURCE SOURCE: NORMAL

## 2017-12-11 NOTE — TELEPHONE ENCOUNTER
1. Caller Name: self                                         Call Back Number: home      Patient approves a detailed voicemail message: N\A    Pt is having a temp of 99.0 to 99.5 and pt is concerned because pt states that that is a fever for her. She said she had tried OTC and nothing is helping her. She Is still taking Mucinex and that is not helping with her cough as well. She is wondering if there is anything else she can try?

## 2017-12-13 ENCOUNTER — OFFICE VISIT (OUTPATIENT)
Dept: MEDICAL GROUP | Facility: MEDICAL CENTER | Age: 27
End: 2017-12-13
Payer: MEDICARE

## 2017-12-13 VITALS
HEIGHT: 65 IN | OXYGEN SATURATION: 95 % | TEMPERATURE: 99.3 F | SYSTOLIC BLOOD PRESSURE: 122 MMHG | WEIGHT: 272.4 LBS | HEART RATE: 90 BPM | BODY MASS INDEX: 45.38 KG/M2 | RESPIRATION RATE: 16 BRPM | DIASTOLIC BLOOD PRESSURE: 68 MMHG

## 2017-12-13 DIAGNOSIS — R05.9 COUGH: ICD-10-CM

## 2017-12-13 PROCEDURE — 99213 OFFICE O/P EST LOW 20 MIN: CPT | Performed by: INTERNAL MEDICINE

## 2017-12-13 RX ORDER — DOXYCYCLINE HYCLATE 100 MG
100 TABLET ORAL 2 TIMES DAILY
Qty: 14 TAB | Refills: 0 | Status: SHIPPED | OUTPATIENT
Start: 2017-12-13 | End: 2017-12-20

## 2017-12-13 RX ORDER — BENZONATATE 100 MG/1
100 CAPSULE ORAL 3 TIMES DAILY PRN
Qty: 30 CAP | Refills: 0 | Status: SHIPPED | OUTPATIENT
Start: 2017-12-13 | End: 2018-01-09

## 2017-12-13 NOTE — PROGRESS NOTES
CC: Cough    HPI:   Kristin presents today with the following.    1. Cough  Presents with approximately 10 days of upper respiratory symptoms starting with initially fevers and body aches developing a productive cough of yellowish-green sputum. She does report that she is more short of breath than normal. Mild sore throat fullness in the ears.      Patient Active Problem List    Diagnosis Date Noted   • Sinus tachycardia 10/31/2013     Priority: High   • Chronic inflammatory arthritis 05/23/2016     Priority: Medium   • Depression 10/28/2016     Priority: Low   • Schizophrenia (CMS-HCC) 10/27/2016     Priority: Low   • Psychosis, schizophrenia, simple ([facility]) 09/29/2016     Priority: Low     Class: Chronic   • TONYA on CPAP 03/07/2016     Priority: Low   • Acquired hypothyroidism 11/23/2015     Priority: Low   • PCOS (polycystic ovarian syndrome) 11/23/2015     Priority: Low   • Progressive focal motor weakness 06/28/2015     Priority: Low   • Fatty liver disease, nonalcoholic 01/19/2015     Priority: Low   • Anxiety 12/16/2014     Priority: Low   • Knee pain, right 02/13/2014     Priority: Low   • Neurogenic bladder 04/02/2011     Priority: Low   • Chronic UTI 09/18/2010     Priority: Low   • Borderline personality disorder in adult 09/18/2010     Priority: Low   • Breast wound 11/06/2017   • Morbid obesity with BMI of 45.0-49.9, adult ([facility]) 10/24/2017   • Intractable episodic cluster headache 09/14/2017   • Rash 06/09/2017   • Hashimoto's encephalopathy 05/17/2017   • Fall at home 05/13/2017   • Type 2 diabetes mellitus without complication, without long-term current use of insulin (CMS-HCC) 04/26/2017   • On home oxygen therapy 04/15/2017   • Chest pain 03/30/2017   • Weakness of right upper extremity 02/23/2017   • Chronic suprapubic catheter (CMS-HCC) 02/16/2017   • Hypovitaminosis D 11/29/2016   • HTN (hypertension) 11/01/2016   • Chronic pain syndrome 10/27/2016   • Bowel and bladder incontinence 10/27/2016   •  Galactorrhea 07/22/2016   • Elevated sedimentation rate 06/27/2016   • Weakness of both lower extremities 06/22/2016   • Vitamin D deficiency 05/21/2016   • Morbidly obese (CMS-HCC) 03/07/2016   • Scoliosis 03/07/2016   • GERD (gastroesophageal reflux disease) 03/07/2016   • Peripheral neuropathy (CMS-HCC) 03/06/2016   • H/O prior ablation treatment 02/10/2016       Current Outpatient Prescriptions   Medication Sig Dispense Refill   • benzonatate (TESSALON) 100 MG Cap Take 1 Cap by mouth 3 times a day as needed for Cough. 30 Cap 0   • doxycycline (VIBRAMYCIN) 100 MG Tab Take 1 Tab by mouth 2 times a day for 7 days. 14 Tab 0   • gabapentin (NEURONTIN) 600 MG tablet Take 1 Tab by mouth 3 times a day. 270 Tab 3   • oxycodone 5 MG capsule Take 5-30 mg by mouth every four hours as needed.     • trazodone (DESYREL) 50 MG Tab Take 1 Tab by mouth every bedtime. 30 Tab 11   • omeprazole (PRILOSEC) 20 MG delayed-release capsule Take 1 Cap by mouth every day. 30 Cap 5   • fluoxetine (PROZAC) 10 MG Cap TAKE ONE CAPSULE BY MOUTH ONCE A DAY 30 Cap 2   • nitroglycerin (NITROSTAT) 0.4 MG SL Tab Place 1 Tab under tongue as needed for Chest Pain. 25 Tab 0   • ivabradine (CORLANOR) 5 MG Tab tablet Take 1 Tab by mouth 2 times a day, with meals. 60 Tab 2   • chlorhexidine (HIBICLENS) 4 % liquid Rinse the area with water. Apply the minimum amount of HIBICLENS necessary to cover the skin area and wash gently. Rinse thoroughly 240 mL 1   • sumatriptan (IMITREX) 20 MG/ACT nasal spray Spray 1 Spray in nose as needed for Migraine. 10 Each 11   • neomycin-polymyxin-HC (PEDIOTIC HC) 3.5-81734-1 Suspension Place 4 Drops in ear 3 times a day. 1 Bottle 0   • ziprasidone (GEODON) 80 MG Cap Take 1 Cap by mouth 2 Times a Day. 60 Cap 2   • fluticasone (FLONASE) 50 MCG/ACT nasal spray Spray 2 Sprays in nose every day. 16 g 11   • loratadine (CLARITIN) 10 MG Tab Take 1 Tab by mouth every day. 30 Tab    • sitagliptin (JANUVIA) 100 MG Tab Take 1 Tab by  mouth every day. 30 Tab 6   • GRALISE 600 MG Tab Take 600 mg by mouth 3 times a day.     • nitroGLYCERIN (NITROSTAT) 0.3 MG SL tablet PLACE 1 TABLET UNDER TONGUE IF NEEDED FOR CHEST PAIN EVERY 5 MINUTES FOR 3 DOSES AS DIRECTED  0   • Mirabegron ER (MYRBETRIQ) 25 MG TABLET SR 24 HR Take 1 Tab by mouth every day.     • lactobacillus granules (LACTINEX/FLORANEX) Pack Take 1 Packet by mouth 3 times a day, with meals.     • levothyroxine (SYNTHROID) 75 MCG Tab Take 75 mcg by mouth Every morning on an empty stomach.     • lactulose 10 GM/15ML Solution Take 10 g by mouth 2 times a day as needed (For constipation).     • furosemide (LASIX) 40 MG Tab Take 40 mg by mouth every day.     • aspirin EC (ECOTRIN) 81 MG Tablet Delayed Response Take 1 Tab by mouth every day. 30 Tab 6   • baclofen (LIORESAL) 10 MG Tab Take 1 Tab by mouth 3 times a day. 90 Tab 6   • Melatonin 5 MG Tab Take 10 mg by mouth every bedtime.     • sodium bicarbonate 325 MG Tab Take 2 Tabs by mouth 3 times a day.     • ondansetron (ZOFRAN ODT) 4 MG TABLET DISPERSIBLE Take 1 Tab by mouth every 6 hours as needed for Nausea. (Patient not taking: Reported on 12/13/2017) 30 Tab 6   • nitrofurantoin monohydr macro (MACROBID) 100 MG Cap Take 1 Cap by mouth every 12 hours for 5 days. (Patient not taking: Reported on 12/13/2017) 10 Cap 0   • MethylPREDNISolone (MEDROL DOSEPAK) 4 MG Tablet Therapy Pack Take 1 Tab by mouth every day. (Patient not taking: Reported on 12/13/2017) 1 Kit 0   • ondansetron (ZOFRAN ODT) 4 MG TABLET DISPERSIBLE Take 1 Tab by mouth every 6 hours as needed for Nausea. (Patient not taking: Reported on 12/13/2017) 10 Tab 0     No current facility-administered medications for this visit.          Allergies as of 12/13/2017 - Reviewed 12/13/2017   Allergen Reaction Noted   • Cefdinir Shortness of Breath and Itching 03/01/2016   • Depakote [divalproex sodium] Unspecified 06/14/2010   • Abilify Unspecified 01/17/2013   • Amitriptyline Unspecified  "10/31/2013   • Amoxicillin Rash 09/18/2010   • Ciprofloxacin Rash 12/17/2009   • Clindamycin Nausea 02/02/2011   • Ees [erythromycin] Vomiting and Nausea 08/28/2010   • Flagyl [metronidazole hcl] Unspecified 03/31/2011   • Flomax [tamsulosin hydrochloride] Swelling 09/24/2009   • Metformin Unspecified 07/23/2013   • Sulfa drugs Hives and Rash 09/18/2010   • Tape Rash 08/15/2012   • Vancomycin Itching 07/10/2016   • Cephalexin [keflex] Rash 01/01/2017   • Erythromycin Rash 03/30/2017   • Levofloxacin Unspecified 10/27/2016   • Metronidazole Rash 03/30/2017   • Valproic acid Rash 03/30/2017        ROS: As per HPI.    /68   Pulse 90   Temp 37.4 °C (99.3 °F)   Resp 16   Ht 1.651 m (5' 5\")   Wt 123.6 kg (272 lb 6.4 oz)   SpO2 95%   BMI 45.33 kg/m²     Physical Exam:  Gen:         Alert and oriented, No apparent distress.  Heent:       TMs clear bilaterally, oropharynx without erythema or exudates  Neck:        No Lymphadenopathy or Bruits.  Lungs: Mild scattered rhonchi  CV:          Regular rate and rhythm. No murmurs, rubs or gallops.               Ext:          No clubbing, cyanosis, edema.      Assessment and Plan.   27 y.o. female with the following issues.    1. Cough  Likely viral in etiology but given her somewhat suppressed respiratory status have started on antibiotic for next 7 days and given cough medication. Cautioned about side effects will follow up if not improving      "

## 2017-12-14 ENCOUNTER — NON-PROVIDER VISIT (OUTPATIENT)
Dept: WOUND CARE | Facility: MEDICAL CENTER | Age: 27
End: 2017-12-14
Attending: FAMILY MEDICINE
Payer: MEDICARE

## 2017-12-14 PROCEDURE — 99212 OFFICE O/P EST SF 10 MIN: CPT

## 2017-12-14 NOTE — CERTIFICATION
[facility]   [facility]   [address]   [address]   [place] [address]   [phone] Fax: [phone]    Discharge Note      Referring Physician: Luz Garcia MD  Wound Etiology: skin lesion  Wound location: Right and Left Breast  ICD-10:   L98.491 (ICD-10-CM) - Nonhealing skin ulcer, limited to breakdown of skin (CMS-[facility])   N64.9 (ICD-10-CM) - Skin lesion of breast   Date of Discharge: 12/14/2017    Assessment:  Discharge patient at this time secondary to wound resolution.  Pt instr dc today, keep area clean, it will be fragile for a few days, bathe and dry area gently, only ever regains a maximum of 80% of the tensile strength of the surrounding skin, remodeling of scar can continue for 6mo - a year. Contact PCP for a referral back her if any problems with area opening and draining again. Pt understands    Thank you for the referral and the opportunity to treat your patient.      Clinician Signature: _____________________________ Date:_______________

## 2017-12-20 ENCOUNTER — OFFICE VISIT (OUTPATIENT)
Dept: MEDICAL GROUP | Facility: MEDICAL CENTER | Age: 27
End: 2017-12-20
Payer: MEDICARE

## 2017-12-20 VITALS
OXYGEN SATURATION: 96 % | DIASTOLIC BLOOD PRESSURE: 82 MMHG | BODY MASS INDEX: 46.76 KG/M2 | TEMPERATURE: 98 F | RESPIRATION RATE: 16 BRPM | WEIGHT: 281 LBS | HEART RATE: 78 BPM | SYSTOLIC BLOOD PRESSURE: 126 MMHG

## 2017-12-20 DIAGNOSIS — R26.81 GAIT INSTABILITY: ICD-10-CM

## 2017-12-20 DIAGNOSIS — G44.319 ACUTE POST-TRAUMATIC HEADACHE, NOT INTRACTABLE: ICD-10-CM

## 2017-12-20 PROCEDURE — 99214 OFFICE O/P EST MOD 30 MIN: CPT | Performed by: INTERNAL MEDICINE

## 2017-12-20 RX ORDER — ZIPRASIDONE HYDROCHLORIDE 80 MG/1
CAPSULE ORAL
Qty: 60 CAP | Refills: 4 | Status: SHIPPED | OUTPATIENT
Start: 2017-12-20 | End: 2018-01-23 | Stop reason: SDUPTHER

## 2017-12-20 NOTE — PROGRESS NOTES
CC: Follow-up trauma fall    HPI:   Kristin presents today with the following.    1. Acute post-traumatic headache, not intractable  Presents after a ground-level fall hitting her head going to [facility] emergency room reporting normal CT of the head results are not available. She does have slight headache but it's improved in nature. Denied any nausea vomiting no weakness or tingling in extremities.    2. Gait instability  She is having gait instability was working with home health and has difficult time getting out of the house. She would like to get back into physical therapy with home health that she cannot transport herself      Patient Active Problem List    Diagnosis Date Noted   • Sinus tachycardia 10/31/2013     Priority: High   • Chronic inflammatory arthritis 05/23/2016     Priority: Medium   • Depression 10/28/2016     Priority: Low   • Schizophrenia (CMS-HCC) 10/27/2016     Priority: Low   • Psychosis, schizophrenia, simple ([facility]) 09/29/2016     Priority: Low     Class: Chronic   • TONYA on CPAP 03/07/2016     Priority: Low   • Acquired hypothyroidism 11/23/2015     Priority: Low   • PCOS (polycystic ovarian syndrome) 11/23/2015     Priority: Low   • Progressive focal motor weakness 06/28/2015     Priority: Low   • Fatty liver disease, nonalcoholic 01/19/2015     Priority: Low   • Anxiety 12/16/2014     Priority: Low   • Knee pain, right 02/13/2014     Priority: Low   • Neurogenic bladder 04/02/2011     Priority: Low   • Chronic UTI 09/18/2010     Priority: Low   • Borderline personality disorder in adult 09/18/2010     Priority: Low   • Breast wound 11/06/2017   • Morbid obesity with BMI of 45.0-49.9, adult ([facility]) 10/24/2017   • Intractable episodic cluster headache 09/14/2017   • Rash 06/09/2017   • Hashimoto's encephalopathy 05/17/2017   • Fall at home 05/13/2017   • Type 2 diabetes mellitus without complication, without long-term current use of insulin (CMS-HCC) 04/26/2017   • On home oxygen therapy  04/15/2017   • Chest pain 03/30/2017   • Weakness of right upper extremity 02/23/2017   • Chronic suprapubic catheter (CMS-HCC) 02/16/2017   • Hypovitaminosis D 11/29/2016   • HTN (hypertension) 11/01/2016   • Chronic pain syndrome 10/27/2016   • Bowel and bladder incontinence 10/27/2016   • Galactorrhea 07/22/2016   • Elevated sedimentation rate 06/27/2016   • Weakness of both lower extremities 06/22/2016   • Vitamin D deficiency 05/21/2016   • Morbidly obese (CMS-HCC) 03/07/2016   • Scoliosis 03/07/2016   • GERD (gastroesophageal reflux disease) 03/07/2016   • Peripheral neuropathy (CMS-HCC) 03/06/2016   • H/O prior ablation treatment 02/10/2016       Current Outpatient Prescriptions   Medication Sig Dispense Refill   • ziprasidone (GEODON) 80 MG Cap TAKE 2 CAPSULES BY MOUTH NIGHTLY AT BEDTIME 60 Cap 4   • albuterol 108 (90 Base) MCG/ACT Aero Soln inhalation aerosol Inhale 2 Puffs by mouth every 6 hours as needed for Shortness of Breath. 8.5 g 1   • benzonatate (TESSALON) 100 MG Cap Take 1 Cap by mouth 3 times a day as needed for Cough. 30 Cap 0   • gabapentin (NEURONTIN) 600 MG tablet Take 1 Tab by mouth 3 times a day. 270 Tab 3   • oxycodone 5 MG capsule Take 5-30 mg by mouth every four hours as needed.     • trazodone (DESYREL) 50 MG Tab Take 1 Tab by mouth every bedtime. 30 Tab 11   • omeprazole (PRILOSEC) 20 MG delayed-release capsule Take 1 Cap by mouth every day. 30 Cap 5   • fluoxetine (PROZAC) 10 MG Cap TAKE ONE CAPSULE BY MOUTH ONCE A DAY 30 Cap 2   • nitroglycerin (NITROSTAT) 0.4 MG SL Tab Place 1 Tab under tongue as needed for Chest Pain. 25 Tab 0   • ivabradine (CORLANOR) 5 MG Tab tablet Take 1 Tab by mouth 2 times a day, with meals. 60 Tab 2   • chlorhexidine (HIBICLENS) 4 % liquid Rinse the area with water. Apply the minimum amount of HIBICLENS necessary to cover the skin area and wash gently. Rinse thoroughly 240 mL 1   • sumatriptan (IMITREX) 20 MG/ACT nasal spray Spray 1 Spray in nose as needed  for Migraine. 10 Each 11   • neomycin-polymyxin-HC (PEDIOTIC HC) 3.5-67339-7 Suspension Place 4 Drops in ear 3 times a day. 1 Bottle 0   • fluticasone (FLONASE) 50 MCG/ACT nasal spray Spray 2 Sprays in nose every day. 16 g 11   • loratadine (CLARITIN) 10 MG Tab Take 1 Tab by mouth every day. 30 Tab    • sitagliptin (JANUVIA) 100 MG Tab Take 1 Tab by mouth every day. 30 Tab 6   • GRALISE 600 MG Tab Take 600 mg by mouth 3 times a day.     • nitroGLYCERIN (NITROSTAT) 0.3 MG SL tablet PLACE 1 TABLET UNDER TONGUE IF NEEDED FOR CHEST PAIN EVERY 5 MINUTES FOR 3 DOSES AS DIRECTED  0   • Mirabegron ER (MYRBETRIQ) 25 MG TABLET SR 24 HR Take 1 Tab by mouth every day.     • lactobacillus granules (LACTINEX/FLORANEX) Pack Take 1 Packet by mouth 3 times a day, with meals.     • levothyroxine (SYNTHROID) 75 MCG Tab Take 75 mcg by mouth Every morning on an empty stomach.     • lactulose 10 GM/15ML Solution Take 10 g by mouth 2 times a day as needed (For constipation).     • furosemide (LASIX) 40 MG Tab Take 40 mg by mouth every day.     • aspirin EC (ECOTRIN) 81 MG Tablet Delayed Response Take 1 Tab by mouth every day. 30 Tab 6   • baclofen (LIORESAL) 10 MG Tab Take 1 Tab by mouth 3 times a day. 90 Tab 6   • Melatonin 5 MG Tab Take 10 mg by mouth every bedtime.     • sodium bicarbonate 325 MG Tab Take 2 Tabs by mouth 3 times a day.     • doxycycline (VIBRAMYCIN) 100 MG Tab Take 1 Tab by mouth 2 times a day for 7 days. (Patient not taking: Reported on 12/20/2017) 14 Tab 0   • ondansetron (ZOFRAN ODT) 4 MG TABLET DISPERSIBLE Take 1 Tab by mouth every 6 hours as needed for Nausea. (Patient not taking: Reported on 12/13/2017) 30 Tab 6   • MethylPREDNISolone (MEDROL DOSEPAK) 4 MG Tablet Therapy Pack Take 1 Tab by mouth every day. (Patient not taking: Reported on 12/13/2017) 1 Kit 0   • ondansetron (ZOFRAN ODT) 4 MG TABLET DISPERSIBLE Take 1 Tab by mouth every 6 hours as needed for Nausea. (Patient not taking: Reported on 12/13/2017) 10  Tab 0     No current facility-administered medications for this visit.          Allergies as of 12/20/2017 - Reviewed 12/20/2017   Allergen Reaction Noted   • Cefdinir Shortness of Breath and Itching 03/01/2016   • Depakote [divalproex sodium] Unspecified 06/14/2010   • Abilify Unspecified 01/17/2013   • Amitriptyline Unspecified 10/31/2013   • Amoxicillin Rash 09/18/2010   • Ciprofloxacin Rash 12/17/2009   • Clindamycin Nausea 02/02/2011   • Ees [erythromycin] Vomiting and Nausea 08/28/2010   • Flagyl [metronidazole hcl] Unspecified 03/31/2011   • Flomax [tamsulosin hydrochloride] Swelling 09/24/2009   • Metformin Unspecified 07/23/2013   • Sulfa drugs Hives and Rash 09/18/2010   • Tape Rash 08/15/2012   • Vancomycin Itching 07/10/2016   • Cephalexin [keflex] Rash 01/01/2017   • Erythromycin Rash 03/30/2017   • Levofloxacin Unspecified 10/27/2016   • Metronidazole Rash 03/30/2017   • Valproic acid Rash 03/30/2017        ROS: As per HPI.    /82   Pulse 78   Temp 36.7 °C (98 °F)   Resp 16   Wt (!) 127.5 kg (281 lb)   SpO2 96%   BMI 46.76 kg/m²     Physical Exam:  Gen:         Alert and oriented, No apparent distress.  Neck:        No Lymphadenopathy or Bruits.  Lungs:     Clear to auscultation bilaterally  CV:          Regular rate and rhythm. No murmurs, rubs or gallops.               Ext:          No clubbing, cyanosis, edema.      Assessment and Plan.   27 y.o. female with the following issues.    1. Acute post-traumatic headache, not intractable  Clinically resolving without sequelae follow-up his symptoms return.    2. Gait instability  Referred to physical therapy.  - REFERRAL TO HOME HEALTH

## 2017-12-21 ENCOUNTER — HOME HEALTH ADMISSION (OUTPATIENT)
Dept: HOME HEALTH SERVICES | Facility: HOME HEALTHCARE | Age: 27
End: 2017-12-21
Payer: MEDICARE

## 2017-12-22 ENCOUNTER — HOME CARE VISIT (OUTPATIENT)
Dept: HOME HEALTH SERVICES | Facility: HOME HEALTHCARE | Age: 27
End: 2017-12-22
Payer: MEDICARE

## 2017-12-22 ENCOUNTER — PATIENT OUTREACH (OUTPATIENT)
Dept: HEALTH INFORMATION MANAGEMENT | Facility: OTHER | Age: 27
End: 2017-12-22

## 2017-12-22 VITALS
RESPIRATION RATE: 18 BRPM | TEMPERATURE: 97.8 F | SYSTOLIC BLOOD PRESSURE: 127 MMHG | HEART RATE: 77 BPM | OXYGEN SATURATION: 98 % | WEIGHT: 270 LBS | HEIGHT: 66 IN | DIASTOLIC BLOOD PRESSURE: 85 MMHG | BODY MASS INDEX: 43.39 KG/M2

## 2017-12-22 PROCEDURE — 665998 HH PPS REVENUE CREDIT

## 2017-12-22 PROCEDURE — G0493 RN CARE EA 15 MIN HH/HOSPICE: HCPCS

## 2017-12-22 PROCEDURE — 665001 SOC-HOME HEALTH

## 2017-12-22 PROCEDURE — 665999 HH PPS REVENUE DEBIT

## 2017-12-22 SDOH — ECONOMIC STABILITY: HOUSING INSECURITY: UNSAFE COOKING RANGE AREA: 0

## 2017-12-22 SDOH — ECONOMIC STABILITY: HOUSING INSECURITY: UNSAFE APPLIANCES: 0

## 2017-12-22 SDOH — ECONOMIC STABILITY: HOUSING INSECURITY
HOME SAFETY: AVE A FIRE ESCAPE PLAN DEVELOPED. PATIENT DOES NOT HAVE FLAMMABLE MATERIALS PRESENT IN THE HOME PRESENTING A FIRE HAZARD. NO EVIDENCE FOUND OF SMOKING MATERIALS PRESENT IN THE HOME.

## 2017-12-22 SDOH — ECONOMIC STABILITY: HOUSING INSECURITY
HOME SAFETY: .OXYGEN SAFETY RISK ASSESSMENT PERFORMED. PATIENT DOES HAVE A NO SMOKING SIGN POSTED IN THE HOME. PATIENT DOES NOT HAVE A WORKING FIRE EXTINGUISHER PRESENT IN THE HOME. SMOKE ALARMS ARE PRESENT AND FUNCTIONAL ON EACH LEVEL OF THE HOME. PATIENT DOES H

## 2017-12-22 ASSESSMENT — ENCOUNTER SYMPTOMS: NAUSEA: ONCE A DAY

## 2017-12-22 ASSESSMENT — PATIENT HEALTH QUESTIONNAIRE - PHQ9
1. LITTLE INTEREST OR PLEASURE IN DOING THINGS: 01
2. FEELING DOWN, DEPRESSED, IRRITABLE, OR HOPELESS: 01

## 2017-12-22 ASSESSMENT — ACTIVITIES OF DAILY LIVING (ADL)
HOME_HEALTH_OASIS: 01
OASIS_M1830: 03

## 2017-12-23 PROCEDURE — 665999 HH PPS REVENUE DEBIT

## 2017-12-23 PROCEDURE — 665998 HH PPS REVENUE CREDIT

## 2017-12-23 NOTE — PROGRESS NOTES
Received referral from [facility] for med rec. Medications reviewed. Interactions noted between ziprasidone with fluoxetine; fluoxetine with trazodone, and fluoxetine with myrbetriq for risk of qt prolongation. Patient follows with cardiology and had a recent EKG on 10/25. Continue to monitor closely for evidence of QT prolongation while on this combination. Will forward note to PCP.     Rosanne Mike, MadihaD

## 2017-12-24 ENCOUNTER — HOME CARE VISIT (OUTPATIENT)
Dept: HOME HEALTH SERVICES | Facility: HOME HEALTHCARE | Age: 27
End: 2017-12-24
Payer: MEDICARE

## 2017-12-24 VITALS
OXYGEN SATURATION: 98 % | TEMPERATURE: 97.9 F | SYSTOLIC BLOOD PRESSURE: 134 MMHG | HEART RATE: 80 BPM | DIASTOLIC BLOOD PRESSURE: 80 MMHG | RESPIRATION RATE: 18 BRPM

## 2017-12-24 PROCEDURE — G0162 HHC RN E&M PLAN SVS, 15 MIN: HCPCS

## 2017-12-24 PROCEDURE — 665999 HH PPS REVENUE DEBIT

## 2017-12-24 PROCEDURE — 665998 HH PPS REVENUE CREDIT

## 2017-12-25 PROCEDURE — 665999 HH PPS REVENUE DEBIT

## 2017-12-25 PROCEDURE — 665998 HH PPS REVENUE CREDIT

## 2017-12-26 ENCOUNTER — HOME CARE VISIT (OUTPATIENT)
Dept: HOME HEALTH SERVICES | Facility: HOME HEALTHCARE | Age: 27
End: 2017-12-26
Payer: MEDICARE

## 2017-12-26 ENCOUNTER — HOSPITAL ENCOUNTER (OUTPATIENT)
Facility: MEDICAL CENTER | Age: 27
End: 2017-12-26
Attending: INTERNAL MEDICINE
Payer: MEDICARE

## 2017-12-26 ENCOUNTER — TELEPHONE (OUTPATIENT)
Dept: HEALTH INFORMATION MANAGEMENT | Facility: OTHER | Age: 27
End: 2017-12-26

## 2017-12-26 VITALS
RESPIRATION RATE: 16 BRPM | TEMPERATURE: 96.7 F | HEART RATE: 77 BPM | SYSTOLIC BLOOD PRESSURE: 112 MMHG | DIASTOLIC BLOOD PRESSURE: 68 MMHG | OXYGEN SATURATION: 98 %

## 2017-12-26 LAB
APPEARANCE UR: ABNORMAL
BACTERIA #/AREA URNS HPF: ABNORMAL /HPF
BILIRUB UR QL STRIP.AUTO: NEGATIVE
COLOR UR: YELLOW
CULTURE IF INDICATED INDCX: NO UA CULTURE
EPI CELLS #/AREA URNS HPF: ABNORMAL /HPF
GLUCOSE UR STRIP.AUTO-MCNC: NEGATIVE MG/DL
HYALINE CASTS #/AREA URNS LPF: ABNORMAL /LPF
KETONES UR STRIP.AUTO-MCNC: NEGATIVE MG/DL
LEUKOCYTE ESTERASE UR QL STRIP.AUTO: NEGATIVE
MICRO URNS: ABNORMAL
NITRITE UR QL STRIP.AUTO: NEGATIVE
PH UR STRIP.AUTO: 5.5 [PH]
PROT UR QL STRIP: NEGATIVE MG/DL
RBC # URNS HPF: ABNORMAL /HPF
RBC UR QL AUTO: NEGATIVE
SP GR UR STRIP.AUTO: 1.02
UROBILINOGEN UR STRIP.AUTO-MCNC: 0.2 MG/DL
WBC #/AREA URNS HPF: ABNORMAL /HPF

## 2017-12-26 PROCEDURE — 665999 HH PPS REVENUE DEBIT

## 2017-12-26 PROCEDURE — 81001 URINALYSIS AUTO W/SCOPE: CPT

## 2017-12-26 PROCEDURE — 665998 HH PPS REVENUE CREDIT

## 2017-12-26 PROCEDURE — G0299 HHS/HOSPICE OF RN EA 15 MIN: HCPCS

## 2017-12-26 RX ORDER — FESOTERODINE FUMARATE 4 MG/1
4 TABLET, EXTENDED RELEASE ORAL DAILY
Qty: 90 TAB | Refills: 3 | Status: SHIPPED | OUTPATIENT
Start: 2017-12-26 | End: 2018-07-14

## 2017-12-26 ASSESSMENT — ENCOUNTER SYMPTOMS
DIFFICULTY THINKING: 1
NAUSEA: DENIES
RESPIRATORY SYMPTOMS COMMENTS: NO CONCERNS AT THE TIME OF THIS ASSESSMENT.
VOMITING: DENIES
DEPRESSED MOOD: 1

## 2017-12-26 NOTE — PROGRESS NOTES
Changing med from myrbetiq to toviaz because of drug interaction.  Lakshmi Please inform patient.

## 2017-12-26 NOTE — TELEPHONE ENCOUNTER
Medications reviewed. Interactions noted between ziprasidone with fluoxetine; fluoxetine with trazodone, and fluoxetine with myrbetriq for risk of qt prolongation. Patient follows with cardiology and had a recent EKG on 10/25. Continue to monitor closely for evidence of QT prolongation while on this combination. Will forward note to PCP.     Rosanne Mike, MadihaD

## 2017-12-27 ENCOUNTER — HOME CARE VISIT (OUTPATIENT)
Dept: HOME HEALTH SERVICES | Facility: HOME HEALTHCARE | Age: 27
End: 2017-12-27
Payer: MEDICARE

## 2017-12-27 PROCEDURE — G0156 HHCP-SVS OF AIDE,EA 15 MIN: HCPCS

## 2017-12-27 PROCEDURE — 665999 HH PPS REVENUE DEBIT

## 2017-12-27 PROCEDURE — G0299 HHS/HOSPICE OF RN EA 15 MIN: HCPCS

## 2017-12-27 PROCEDURE — 665998 HH PPS REVENUE CREDIT

## 2017-12-27 PROCEDURE — A6212 FOAM DRG <=16 SQ IN W/BORDER: HCPCS

## 2017-12-28 ENCOUNTER — HOME CARE VISIT (OUTPATIENT)
Dept: HOME HEALTH SERVICES | Facility: HOME HEALTHCARE | Age: 27
End: 2017-12-28
Payer: MEDICARE

## 2017-12-28 ENCOUNTER — HOSPITAL ENCOUNTER (OUTPATIENT)
Dept: LAB | Facility: MEDICAL CENTER | Age: 27
End: 2017-12-28
Attending: INTERNAL MEDICINE
Payer: MEDICARE

## 2017-12-28 VITALS
TEMPERATURE: 97.6 F | SYSTOLIC BLOOD PRESSURE: 138 MMHG | OXYGEN SATURATION: 98 % | HEART RATE: 78 BPM | RESPIRATION RATE: 20 BRPM | DIASTOLIC BLOOD PRESSURE: 98 MMHG

## 2017-12-28 VITALS
TEMPERATURE: 97.1 F | RESPIRATION RATE: 20 BRPM | OXYGEN SATURATION: 98 % | DIASTOLIC BLOOD PRESSURE: 79 MMHG | SYSTOLIC BLOOD PRESSURE: 128 MMHG | HEART RATE: 76 BPM

## 2017-12-28 LAB
25(OH)D3 SERPL-MCNC: 19 NG/ML (ref 30–100)
ALBUMIN SERPL BCP-MCNC: 4.4 G/DL (ref 3.2–4.9)
BASOPHILS # BLD AUTO: 0.6 % (ref 0–1.8)
BASOPHILS # BLD: 0.04 K/UL (ref 0–0.12)
BUN SERPL-MCNC: 10 MG/DL (ref 8–22)
CALCIUM SERPL-MCNC: 9.5 MG/DL (ref 8.5–10.5)
CHLORIDE SERPL-SCNC: 107 MMOL/L (ref 96–112)
CO2 SERPL-SCNC: 21 MMOL/L (ref 20–33)
CREAT SERPL-MCNC: 0.76 MG/DL (ref 0.5–1.4)
CRP SERPL HS-MCNC: 0.8 MG/DL (ref 0–0.75)
EOSINOPHIL # BLD AUTO: 0.13 K/UL (ref 0–0.51)
EOSINOPHIL NFR BLD: 1.8 % (ref 0–6.9)
ERYTHROCYTE [DISTWIDTH] IN BLOOD BY AUTOMATED COUNT: 44.3 FL (ref 35.9–50)
FERRITIN SERPL-MCNC: 36.9 NG/ML (ref 10–291)
GFR SERPL CREATININE-BSD FRML MDRD: >60 ML/MIN/1.73 M 2
GLUCOSE SERPL-MCNC: 103 MG/DL (ref 65–99)
HCT VFR BLD AUTO: 42.4 % (ref 37–47)
HGB BLD-MCNC: 14.2 G/DL (ref 12–16)
IMM GRANULOCYTES # BLD AUTO: 0.03 K/UL (ref 0–0.11)
IMM GRANULOCYTES NFR BLD AUTO: 0.4 % (ref 0–0.9)
IRON SATN MFR SERPL: 18 % (ref 15–55)
IRON SERPL-MCNC: 81 UG/DL (ref 40–170)
LYMPHOCYTES # BLD AUTO: 1.91 K/UL (ref 1–4.8)
LYMPHOCYTES NFR BLD: 26.8 % (ref 22–41)
MAGNESIUM SERPL-MCNC: 1.8 MG/DL (ref 1.5–2.5)
MCH RBC QN AUTO: 30.5 PG (ref 27–33)
MCHC RBC AUTO-ENTMCNC: 33.5 G/DL (ref 33.6–35)
MCV RBC AUTO: 91.2 FL (ref 81.4–97.8)
MONOCYTES # BLD AUTO: 0.48 K/UL (ref 0–0.85)
MONOCYTES NFR BLD AUTO: 6.7 % (ref 0–13.4)
NEUTROPHILS # BLD AUTO: 4.53 K/UL (ref 2–7.15)
NEUTROPHILS NFR BLD: 63.7 % (ref 44–72)
NRBC # BLD AUTO: 0 K/UL
NRBC BLD-RTO: 0 /100 WBC
PHOSPHATE SERPL-MCNC: 2.8 MG/DL (ref 2.5–4.5)
PLATELET # BLD AUTO: 218 K/UL (ref 164–446)
PMV BLD AUTO: 12.3 FL (ref 9–12.9)
POTASSIUM SERPL-SCNC: 4.3 MMOL/L (ref 3.6–5.5)
PTH-INTACT SERPL-MCNC: 65.6 PG/ML (ref 14–72)
RBC # BLD AUTO: 4.65 M/UL (ref 4.2–5.4)
SODIUM SERPL-SCNC: 137 MMOL/L (ref 135–145)
TIBC SERPL-MCNC: 444 UG/DL (ref 250–450)
URATE SERPL-MCNC: 6.2 MG/DL (ref 1.9–8.2)
WBC # BLD AUTO: 7.1 K/UL (ref 4.8–10.8)

## 2017-12-28 PROCEDURE — 665999 HH PPS REVENUE DEBIT

## 2017-12-28 PROCEDURE — 83735 ASSAY OF MAGNESIUM: CPT

## 2017-12-28 PROCEDURE — 86140 C-REACTIVE PROTEIN: CPT

## 2017-12-28 PROCEDURE — 83550 IRON BINDING TEST: CPT

## 2017-12-28 PROCEDURE — 85025 COMPLETE CBC W/AUTO DIFF WBC: CPT

## 2017-12-28 PROCEDURE — 84550 ASSAY OF BLOOD/URIC ACID: CPT

## 2017-12-28 PROCEDURE — G0152 HHCP-SERV OF OT,EA 15 MIN: HCPCS

## 2017-12-28 PROCEDURE — 665998 HH PPS REVENUE CREDIT

## 2017-12-28 PROCEDURE — 83540 ASSAY OF IRON: CPT

## 2017-12-28 PROCEDURE — G0151 HHCP-SERV OF PT,EA 15 MIN: HCPCS

## 2017-12-28 PROCEDURE — 82306 VITAMIN D 25 HYDROXY: CPT | Mod: GA

## 2017-12-28 PROCEDURE — 80069 RENAL FUNCTION PANEL: CPT

## 2017-12-28 PROCEDURE — 36415 COLL VENOUS BLD VENIPUNCTURE: CPT

## 2017-12-28 PROCEDURE — 83970 ASSAY OF PARATHORMONE: CPT

## 2017-12-28 PROCEDURE — 82728 ASSAY OF FERRITIN: CPT

## 2017-12-28 ASSESSMENT — ENCOUNTER SYMPTOMS
DEBILITATING PAIN: 1
POOR JUDGMENT: 1

## 2017-12-28 ASSESSMENT — ACTIVITIES OF DAILY LIVING (ADL)
TRANSPORTATION COMMENTS: FALL RISK
IADLS_COMMENTS: <!--EPICS-->SEE OT REPORT<!--EPICE-->
DRESSING_UB_ASSISTANCE: 0
TOILETING_EQUIPMENT_USED: BSC
ADLS_COMMENTS: <!--EPICS-->SEE OT REPORT<!--EPICE-->
TOILETING_ASSISTANCE: 4

## 2017-12-29 ENCOUNTER — TELEPHONE (OUTPATIENT)
Dept: MEDICAL GROUP | Facility: MEDICAL CENTER | Age: 27
End: 2017-12-29

## 2017-12-29 ENCOUNTER — HOME CARE VISIT (OUTPATIENT)
Dept: HOME HEALTH SERVICES | Facility: HOME HEALTHCARE | Age: 27
End: 2017-12-29
Payer: MEDICARE

## 2017-12-29 VITALS
TEMPERATURE: 97.6 F | RESPIRATION RATE: 20 BRPM | HEART RATE: 73 BPM | DIASTOLIC BLOOD PRESSURE: 60 MMHG | OXYGEN SATURATION: 97 % | SYSTOLIC BLOOD PRESSURE: 120 MMHG

## 2017-12-29 VITALS
SYSTOLIC BLOOD PRESSURE: 111 MMHG | HEART RATE: 87 BPM | TEMPERATURE: 97.5 F | OXYGEN SATURATION: 99 % | DIASTOLIC BLOOD PRESSURE: 62 MMHG | RESPIRATION RATE: 20 BRPM

## 2017-12-29 VITALS
RESPIRATION RATE: 20 BRPM | HEART RATE: 76 BPM | TEMPERATURE: 96.9 F | DIASTOLIC BLOOD PRESSURE: 60 MMHG | SYSTOLIC BLOOD PRESSURE: 100 MMHG | OXYGEN SATURATION: 95 %

## 2017-12-29 PROCEDURE — 665999 HH PPS REVENUE DEBIT

## 2017-12-29 PROCEDURE — 665998 HH PPS REVENUE CREDIT

## 2017-12-29 PROCEDURE — G0299 HHS/HOSPICE OF RN EA 15 MIN: HCPCS

## 2017-12-29 PROCEDURE — G0156 HHCP-SVS OF AIDE,EA 15 MIN: HCPCS

## 2017-12-29 ASSESSMENT — ENCOUNTER SYMPTOMS
DEPRESSED MOOD: 1
POOR JUDGMENT: 1

## 2017-12-29 NOTE — TELEPHONE ENCOUNTER
----- Message from Torres Brody M.D. sent at 12/28/2017  9:50 PM PST -----  Please call and tell patient nitroglycerine is for chest pain not for hear rate.   ----- Message -----  From: Abhishek Baker, PT  Sent: 12/28/2017   6:18 PM  To: Torres Brody M.D.    FYI:  On physical therapy evaluation pt had nitroglycerin. She states that she takes the medication everytime her heart rate >90bpm. Advised pt to consult with PCP and nursing on correct use of medication as taking to much can have serious side effects. Advised pt to expect HR increase with exercise as a normal physiological response.

## 2017-12-29 NOTE — TELEPHONE ENCOUNTER
Patient returned call. Advised her of information below. Patient states that PT interpreted her information incorrectly. She is taking her nitroglycerin for chest pain and not for elevated heart rate.

## 2017-12-30 VITALS
RESPIRATION RATE: 20 BRPM | HEART RATE: 73 BPM | SYSTOLIC BLOOD PRESSURE: 124 MMHG | OXYGEN SATURATION: 97 % | DIASTOLIC BLOOD PRESSURE: 66 MMHG | TEMPERATURE: 98.2 F

## 2017-12-30 PROCEDURE — 665999 HH PPS REVENUE DEBIT

## 2017-12-30 PROCEDURE — 665998 HH PPS REVENUE CREDIT

## 2017-12-30 ASSESSMENT — ENCOUNTER SYMPTOMS
RESPIRATORY SYMPTOMS COMMENTS: NO NOTED RESPIRATORY DISTRESS, LUNGS ARE CLEAR, NO ADVENTITIOUS LUNG SOUNDS NOTED, NO NOTED COUGH AND NONE REPORTED.
NAUSEA: DENIES
DEPRESSED MOOD: 1
VOMITING: DENIES
DIFFICULTY THINKING: 1

## 2017-12-31 PROCEDURE — 665998 HH PPS REVENUE CREDIT

## 2017-12-31 PROCEDURE — 665999 HH PPS REVENUE DEBIT

## 2017-12-31 SDOH — ECONOMIC STABILITY: HOUSING INSECURITY
HOME SAFETY: RAMP ENTRY. INSTRUCTED PT TO MAINTAIN CLEAR PATHWAYS FOR FALL PREVENTION AND SAFETY. PT STATES SHE USES SMALL STALL SHOWER AT TIMES FOR BATHING W/O SHOWER CHAIR, GRAB BARS. INSRUCTED TO USE NON-SLIP BATHMAT.

## 2017-12-31 ASSESSMENT — ACTIVITIES OF DAILY LIVING (ADL)
TRANSPORTATION_ASSISTANCE: 6
SHOPPING_ASSISTANCE: 6
DRESSING_LB_ASSISTANCE: 4
HOUSEKEEPING_ASSISTANCE: 6
EATING_ASSISTANCE: 0
BATHING_ASSISTANCE: 5
ORAL_CARE_ASSISTANCE: 0
TELEPHONE_ASSISTANCE: 0
GROOMING_ASSISTANCE: 0
MEAL_PREP_ASSISTANCE: 5
GROOMING_COMMENTS: STANDING AT SINK
LAUNDRY_ASSISTANCE: 5

## 2017-12-31 ASSESSMENT — ENCOUNTER SYMPTOMS: POOR JUDGMENT: 1

## 2018-01-01 ENCOUNTER — HOME CARE VISIT (OUTPATIENT)
Dept: HOME HEALTH SERVICES | Facility: HOME HEALTHCARE | Age: 28
End: 2018-01-01
Payer: MEDICARE

## 2018-01-01 VITALS
RESPIRATION RATE: 18 BRPM | HEART RATE: 73 BPM | OXYGEN SATURATION: 95 % | DIASTOLIC BLOOD PRESSURE: 96 MMHG | SYSTOLIC BLOOD PRESSURE: 142 MMHG | TEMPERATURE: 98.6 F

## 2018-01-01 VITALS
SYSTOLIC BLOOD PRESSURE: 132 MMHG | TEMPERATURE: 98.5 F | OXYGEN SATURATION: 98 % | HEART RATE: 74 BPM | RESPIRATION RATE: 18 BRPM | DIASTOLIC BLOOD PRESSURE: 70 MMHG

## 2018-01-01 PROCEDURE — G0299 HHS/HOSPICE OF RN EA 15 MIN: HCPCS

## 2018-01-01 PROCEDURE — G0152 HHCP-SERV OF OT,EA 15 MIN: HCPCS

## 2018-01-01 PROCEDURE — G0151 HHCP-SERV OF PT,EA 15 MIN: HCPCS

## 2018-01-01 PROCEDURE — 665999 HH PPS REVENUE DEBIT

## 2018-01-01 PROCEDURE — 665998 HH PPS REVENUE CREDIT

## 2018-01-01 ASSESSMENT — ENCOUNTER SYMPTOMS
THOUGHT CONTENT - SUSPICIOUS: 1
POOR JUDGMENT: 1
DEBILITATING PAIN: 1
POOR JUDGMENT: 1

## 2018-01-02 ENCOUNTER — HOME CARE VISIT (OUTPATIENT)
Dept: HOME HEALTH SERVICES | Facility: HOME HEALTHCARE | Age: 28
End: 2018-01-02
Payer: MEDICARE

## 2018-01-02 VITALS
OXYGEN SATURATION: 95 % | TEMPERATURE: 98.6 F | HEART RATE: 78 BPM | DIASTOLIC BLOOD PRESSURE: 74 MMHG | SYSTOLIC BLOOD PRESSURE: 132 MMHG | RESPIRATION RATE: 18 BRPM

## 2018-01-02 PROCEDURE — 665998 HH PPS REVENUE CREDIT

## 2018-01-02 PROCEDURE — 665999 HH PPS REVENUE DEBIT

## 2018-01-02 SDOH — ECONOMIC STABILITY: HOUSING INSECURITY: UNSAFE APPLIANCES: 0

## 2018-01-02 SDOH — ECONOMIC STABILITY: HOUSING INSECURITY: UNSAFE COOKING RANGE AREA: 0

## 2018-01-02 ASSESSMENT — ACTIVITIES OF DAILY LIVING (ADL): HOME_HEALTH_OASIS: 02

## 2018-01-03 ENCOUNTER — HOME CARE VISIT (OUTPATIENT)
Dept: HOME HEALTH SERVICES | Facility: HOME HEALTHCARE | Age: 28
End: 2018-01-03
Payer: MEDICARE

## 2018-01-03 VITALS
SYSTOLIC BLOOD PRESSURE: 120 MMHG | HEART RATE: 84 BPM | RESPIRATION RATE: 20 BRPM | OXYGEN SATURATION: 98 % | TEMPERATURE: 98.2 F | DIASTOLIC BLOOD PRESSURE: 89 MMHG

## 2018-01-03 VITALS
RESPIRATION RATE: 18 BRPM | OXYGEN SATURATION: 98 % | HEART RATE: 80 BPM | TEMPERATURE: 98.2 F | DIASTOLIC BLOOD PRESSURE: 80 MMHG | SYSTOLIC BLOOD PRESSURE: 120 MMHG

## 2018-01-03 PROCEDURE — 665998 HH PPS REVENUE CREDIT

## 2018-01-03 PROCEDURE — G0156 HHCP-SVS OF AIDE,EA 15 MIN: HCPCS

## 2018-01-03 PROCEDURE — 665999 HH PPS REVENUE DEBIT

## 2018-01-03 PROCEDURE — G0299 HHS/HOSPICE OF RN EA 15 MIN: HCPCS

## 2018-01-04 ENCOUNTER — HOME CARE VISIT (OUTPATIENT)
Dept: HOME HEALTH SERVICES | Facility: HOME HEALTHCARE | Age: 28
End: 2018-01-04
Payer: MEDICARE

## 2018-01-04 VITALS
SYSTOLIC BLOOD PRESSURE: 131 MMHG | DIASTOLIC BLOOD PRESSURE: 86 MMHG | OXYGEN SATURATION: 95 % | RESPIRATION RATE: 18 BRPM | HEART RATE: 75 BPM | TEMPERATURE: 96.8 F

## 2018-01-04 PROCEDURE — 665999 HH PPS REVENUE DEBIT

## 2018-01-04 PROCEDURE — 665998 HH PPS REVENUE CREDIT

## 2018-01-04 PROCEDURE — G0151 HHCP-SERV OF PT,EA 15 MIN: HCPCS

## 2018-01-05 ENCOUNTER — HOSPITAL ENCOUNTER (OUTPATIENT)
Facility: MEDICAL CENTER | Age: 28
End: 2018-01-05
Attending: STUDENT IN AN ORGANIZED HEALTH CARE EDUCATION/TRAINING PROGRAM
Payer: MEDICARE

## 2018-01-05 ENCOUNTER — HOME CARE VISIT (OUTPATIENT)
Dept: HOME HEALTH SERVICES | Facility: HOME HEALTHCARE | Age: 28
End: 2018-01-05
Payer: MEDICARE

## 2018-01-05 ENCOUNTER — OFFICE VISIT (OUTPATIENT)
Dept: INTERNAL MEDICINE | Facility: MEDICAL CENTER | Age: 28
End: 2018-01-05
Payer: MEDICARE

## 2018-01-05 VITALS
RESPIRATION RATE: 20 BRPM | TEMPERATURE: 98.7 F | DIASTOLIC BLOOD PRESSURE: 72 MMHG | OXYGEN SATURATION: 98 % | SYSTOLIC BLOOD PRESSURE: 134 MMHG | HEART RATE: 78 BPM

## 2018-01-05 VITALS
TEMPERATURE: 98.5 F | OXYGEN SATURATION: 94 % | HEART RATE: 74 BPM | SYSTOLIC BLOOD PRESSURE: 104 MMHG | DIASTOLIC BLOOD PRESSURE: 72 MMHG | HEIGHT: 65 IN | BODY MASS INDEX: 45.48 KG/M2 | WEIGHT: 273 LBS

## 2018-01-05 DIAGNOSIS — F12.20 MARIHUANA DEPENDENCE (HCC): ICD-10-CM

## 2018-01-05 DIAGNOSIS — R19.7 WATERY DIARRHEA: ICD-10-CM

## 2018-01-05 PROBLEM — K59.1 FUNCTIONAL DIARRHEA: Status: ACTIVE | Noted: 2018-01-05

## 2018-01-05 LAB — WBC STL QL MICRO: NORMAL

## 2018-01-05 PROCEDURE — 665998 HH PPS REVENUE CREDIT

## 2018-01-05 PROCEDURE — 665999 HH PPS REVENUE DEBIT

## 2018-01-05 PROCEDURE — 99214 OFFICE O/P EST MOD 30 MIN: CPT | Mod: GC | Performed by: INTERNAL MEDICINE

## 2018-01-05 PROCEDURE — 89055 LEUKOCYTE ASSESSMENT FECAL: CPT

## 2018-01-05 PROCEDURE — G0299 HHS/HOSPICE OF RN EA 15 MIN: HCPCS

## 2018-01-05 ASSESSMENT — ENCOUNTER SYMPTOMS
DIFFICULTY THINKING: 1
RESPIRATORY SYMPTOMS COMMENTS: NO NOTED RESPIRATORY DISTRESS, LUNGS ARE CLEAR, NO ADVENTITIOUS LUNG SOUNDS NOTED, NO NOTED COUGH AND NONE REPORTED.
VOMITING: DENIES
DEPRESSED MOOD: 1

## 2018-01-05 NOTE — PATIENT INSTRUCTIONS
Have BRAT (Bananas, Rice, Apple Sauce, Toasted bread) in your diet  Remain Hydrated with Gatorade and electrolytes  Get your stool exam  Follow up with your PCP, or call us back  If severe diarrhea or fever develops please go to nearest ED      Diarrhea  Diarrhea is frequent loose and watery bowel movements. It can cause you to feel weak and dehydrated. Dehydration can cause you to become tired and thirsty, have a dry mouth, and have decreased urination that often is dark yellow. Diarrhea is a sign of another problem, most often an infection that will not last long. In most cases, diarrhea typically lasts 2-3 days. However, it can last longer if it is a sign of something more serious. It is important to treat your diarrhea as directed by your caregiver to lessen or prevent future episodes of diarrhea.  CAUSES   Some common causes include:  · Gastrointestinal infections caused by viruses, bacteria, or parasites.  · Food poisoning or food allergies.  · Certain medicines, such as antibiotics, chemotherapy, and laxatives.  · Artificial sweeteners and fructose.  · Digestive disorders.  HOME CARE INSTRUCTIONS  · Ensure adequate fluid intake (hydration): Have 1 cup (8 oz) of fluid for each diarrhea episode. Avoid fluids that contain simple sugars or sports drinks, fruit juices, whole milk products, and sodas. Your urine should be clear or pale yellow if you are drinking enough fluids. Hydrate with an oral rehydration solution that you can purchase at pharmacies, retail stores, and online. You can prepare an oral rehydration solution at home by mixing the following ingredients together:  ¨  - tsp table salt.  ¨ ¾ tsp baking soda.  ¨  tsp salt substitute containing potassium chloride.  ¨ 1  tablespoons sugar.  ¨ 1 L (34 oz) of water.  · Certain foods and beverages may increase the speed at which food moves through the gastrointestinal (GI) tract. These foods and beverages should be avoided and include:  ¨ Caffeinated and  alcoholic beverages.  ¨ High-fiber foods, such as raw fruits and vegetables, nuts, seeds, and whole grain breads and cereals.  ¨ Foods and beverages sweetened with sugar alcohols, such as xylitol, sorbitol, and mannitol.  · Some foods may be well tolerated and may help thicken stool including:  ¨ Starchy foods, such as rice, toast, pasta, low-sugar cereal, oatmeal, grits, baked potatoes, crackers, and bagels.  ¨ Bananas.  ¨ Applesauce.  · Add probiotic-rich foods to help increase healthy bacteria in the GI tract, such as yogurt and fermented milk products.  · Wash your hands well after each diarrhea episode.  · Only take over-the-counter or prescription medicines as directed by your caregiver.  · Take a warm bath to relieve any burning or pain from frequent diarrhea episodes.  SEEK IMMEDIATE MEDICAL CARE IF:   · You are unable to keep fluids down.  · You have persistent vomiting.  · You have blood in your stool, or your stools are black and tarry.  · You do not urinate in 6-8 hours, or there is only a small amount of very dark urine.  · You have abdominal pain that increases or localizes.  · You have weakness, dizziness, confusion, or light-headedness.  · You have a severe headache.  · Your diarrhea gets worse or does not get better.  · You have a fever or persistent symptoms for more than 2-3 days.  · You have a fever and your symptoms suddenly get worse.  MAKE SURE YOU:   · Understand these instructions.  · Will watch your condition.  · Will get help right away if you are not doing well or get worse.     This information is not intended to replace advice given to you by your health care provider. Make sure you discuss any questions you have with your health care provider.     Document Released: 12/08/2003 Document Revised: 01/08/2016 Document Reviewed: 08/25/2013  [employer] Patient Education ©2016 [employer]

## 2018-01-05 NOTE — PROGRESS NOTES
Established Patient    Kristin presents today with the following:    CC: Watery diarrhea for 7 days    HPI:     Patient is a pleasant 27 yr old morbidly obese  female for an acute short visit (but new to our clinic)  as she could not get her appointment with her PCP Dr. Brody.    She is here for her watery diarrhea for 7 days, associated with crampy abdominal pain, but not associated with any blood in stools, fever, chills, burning micturition or flank pain or any nausea or vomiting or dizziness. She did not travel outside, however her aunt who brings food from outside, is a frequent traveler, and her grandmother cooks food for her does not wash hands before cooking the food and touching the raw meat. She declined any history of food or recent antibiotic use, although sometime back she had Rocephin for UTI.    Patient has been on multiple medications for several of her comorbid conditions which include diabetes mellitus ( on Januvia-Sitagliptin), morbid obesity, hypothyroidism (levothyroxine 200 µg), chronic back pain and neuropathy ( on Percocet, gabapentin, baclofen) arthritis of hips and knees, asthma (on albuterol), kidney disease and atrial fibrillation (on Ivabradine and Lasix 80 mg, NaHco3 replacement), schizophrenia and bipolar illness ( on ziprasidone 80 mg, trazodone, fluoxetine, Phenergan, Zofran,)  mitochondrial inherited myopathy, and hypovitaminosis D.    Of note patient has been taking marijuana extensively for her chronic back pain and recurrent urinary tract infection and bladder pain. She is not sure if the marijuana which she chews is contaminated.      Patient Active Problem List    Diagnosis Date Noted   • Sinus tachycardia 10/31/2013     Priority: High   • Chronic inflammatory arthritis 05/23/2016     Priority: Medium   • Depression 10/28/2016     Priority: Low   • Schizophrenia (CMS-[facility]) 10/27/2016     Priority: Low   • Psychosis, schizophrenia, simple ([facility]) 09/29/2016     Priority:  Low     Class: Chronic   • TONYA on CPAP 03/07/2016     Priority: Low   • Acquired hypothyroidism 11/23/2015     Priority: Low   • PCOS (polycystic ovarian syndrome) 11/23/2015     Priority: Low   • Progressive focal motor weakness 06/28/2015     Priority: Low   • Fatty liver disease, nonalcoholic 01/19/2015     Priority: Low   • Anxiety 12/16/2014     Priority: Low   • Knee pain, right 02/13/2014     Priority: Low   • Neurogenic bladder 04/02/2011     Priority: Low   • Chronic UTI 09/18/2010     Priority: Low   • Borderline personality disorder in adult 09/18/2010     Priority: Low   • Functional diarrhea 01/05/2018   • Breast wound 11/06/2017   • Morbid obesity with BMI of 45.0-49.9, adult ([facility]) 10/24/2017   • Intractable episodic cluster headache 09/14/2017   • Rash 06/09/2017   • Hashimoto's encephalopathy 05/17/2017   • Fall at home 05/13/2017   • Type 2 diabetes mellitus without complication, without long-term current use of insulin (CMS-HCC) 04/26/2017   • On home oxygen therapy 04/15/2017   • Chest pain 03/30/2017   • Weakness of right upper extremity 02/23/2017   • Chronic suprapubic catheter (CMS-HCC) 02/16/2017   • Hypovitaminosis D 11/29/2016   • HTN (hypertension) 11/01/2016   • Chronic pain syndrome 10/27/2016   • Bowel and bladder incontinence 10/27/2016   • Galactorrhea 07/22/2016   • Elevated sedimentation rate 06/27/2016   • Weakness of both lower extremities 06/22/2016   • Vitamin D deficiency 05/21/2016   • Morbidly obese (CMS-HCC) 03/07/2016   • Scoliosis 03/07/2016   • GERD (gastroesophageal reflux disease) 03/07/2016   • Peripheral neuropathy (CMS-HCC) 03/06/2016   • H/O prior ablation treatment 02/10/2016       Current Outpatient Prescriptions   Medication Sig Dispense Refill   • fesoterodine fumarate (TOVIAZ) 4 MG TABLET SR 24 HR Take 1 Tab by mouth every day. 90 Tab 3   • oxycodone-acetaminophen (PERCOCET-10)  MG Tab Take 1 Tab by mouth 2 times a day as needed for Severe Pain. take 1/2  pill every 2-3 days     • Diclofenac Sodium 1 % Gel Apply 1 Application to affected area(s) 1 time daily as needed (pain). alternates between this and .3     • promethazine (PHENERGAN) 25 MG Tab Take 25 mg by mouth every 6 hours as needed for Nausea/Vomiting.     • ziprasidone (GEODON) 80 MG Cap TAKE 2 CAPSULES BY MOUTH NIGHTLY AT BEDTIME 60 Cap 4   • albuterol 108 (90 Base) MCG/ACT Aero Soln inhalation aerosol Inhale 2 Puffs by mouth every 6 hours as needed for Shortness of Breath. 8.5 g 1   • gabapentin (NEURONTIN) 600 MG tablet Take 1 Tab by mouth 3 times a day. 270 Tab 3   • trazodone (DESYREL) 50 MG Tab Take 1 Tab by mouth every bedtime. 30 Tab 11   • omeprazole (PRILOSEC) 20 MG delayed-release capsule Take 1 Cap by mouth every day. 30 Cap 5   • fluoxetine (PROZAC) 10 MG Cap TAKE ONE CAPSULE BY MOUTH ONCE A DAY 30 Cap 2   • nitroglycerin (NITROSTAT) 0.4 MG SL Tab Place 1 Tab under tongue as needed for Chest Pain. 25 Tab 0   • ivabradine (CORLANOR) 5 MG Tab tablet Take 1 Tab by mouth 2 times a day, with meals. 60 Tab 2   • chlorhexidine (HIBICLENS) 4 % liquid Rinse the area with water. Apply the minimum amount of HIBICLENS necessary to cover the skin area and wash gently. Rinse thoroughly 240 mL 1   • sumatriptan (IMITREX) 20 MG/ACT nasal spray Spray 1 Spray in nose as needed for Migraine. 10 Each 11   • neomycin-polymyxin-HC (PEDIOTIC HC) 3.5-22586-5 Suspension Place 4 Drops in ear 3 times a day. 1 Bottle 0   • fluticasone (FLONASE) 50 MCG/ACT nasal spray Spray 2 Sprays in nose every day. 16 g 11   • loratadine (CLARITIN) 10 MG Tab Take 1 Tab by mouth every day. 30 Tab    • sitagliptin (JANUVIA) 100 MG Tab Take 1 Tab by mouth every day. (Patient taking differently: Take 50 mg by mouth every day.) 30 Tab 6   • levothyroxine (SYNTHROID) 75 MCG Tab Take 75 mcg by mouth Every morning on an empty stomach.     • lactulose 10 GM/15ML Solution Take 10 g by mouth 2 times a day as needed (For constipation).     •  "furosemide (LASIX) 40 MG Tab Take 40 mg by mouth every day.     • aspirin EC (ECOTRIN) 81 MG Tablet Delayed Response Take 1 Tab by mouth every day. 30 Tab 6   • baclofen (LIORESAL) 10 MG Tab Take 1 Tab by mouth 3 times a day. 90 Tab 6   • Melatonin 5 MG Tab Take 10 mg by mouth every bedtime.     • sodium bicarbonate 325 MG Tab Take 2 Tabs by mouth 3 times a day.     • benzonatate (TESSALON) 100 MG Cap Take 1 Cap by mouth 3 times a day as needed for Cough. 30 Cap 0   • ondansetron (ZOFRAN ODT) 4 MG TABLET DISPERSIBLE Take 1 Tab by mouth every 6 hours as needed for Nausea. (Patient not taking: Reported on 12/13/2017) 30 Tab 6   • nitroGLYCERIN (NITROSTAT) 0.3 MG SL tablet PLACE 1 TABLET UNDER TONGUE IF NEEDED FOR CHEST PAIN EVERY 5 MINUTES FOR 3 DOSES AS DIRECTED  0     No current facility-administered medications for this visit.        ROS: As per HPI. Additional pertinent symptoms as noted below.  Negative for chest pain, or shortness of breath  Negative for palpitations  Negative for dizziness      /72   Pulse 74   Temp 36.9 °C (98.5 °F)   Ht 1.651 m (5' 5\")   Wt 123.8 kg (273 lb)   SpO2 94%   BMI 45.43 kg/m²     Physical Exam   Constitutional:  Morbidly obese female, afebrile, anicteric, Pink moist  mucosa  oriented to person, place, and time. No distress.   Eyes: Pupils are equal, round, and reactive to light. No scleral icterus.  Neck: Neck supple. No thyromegaly present.   Cardiovascular: Normal rate, regular rhythm and normal heart sounds.  Exam reveals no gallop and no friction rub.  No murmur heard.  Pulmonary/Chest: Breath sounds normal. Chest wall is not dull to percussion.   Abdominal examination; soft, diffuse mild tenderness to deep palpation, bowel sounds normal, no distention  Musculoskeletal:   pedal edema.   Lymphadenopathy: no cervical adenopathy  Neurological: alert and oriented to person, place, and time.   Skin: No cyanosis. Nails show no clubbing.  Other:       Assessment and " Plan    1. Watery diarrhea  2. Marihuana dependence (CMS-HCC)  morbidly obese female with multiple medications and severe marijuana use, having diarrhea for 7 days with no signs of acute infection or dehydration, stable vital signs, abdominal examination  showing minimal tenderness with normal bowel sounds, her diarrhea is most likely due to either food born or medications or marihuana contamination. Patient is advised to remain hydrated with fluids such as  Gatorade electrolytes, and use BRAT diet consisting of banana, rice, apple sauce, toast. In the interim we will obtain  stool for C. Difficile antigen, (given remote history of Ceftriaxone antibiotics for UTI), stool for ova and parasites and WBCs to rule out any infective causes of diarrhea or Giardiasis. Should her symptoms get worse or develops high fever she is advised to go to the ED or call us back, and also follow-up with her PCP for her chronic medical conditions.        Signed by: Ebonie Ferguson M.D.

## 2018-01-06 ENCOUNTER — HOME CARE VISIT (OUTPATIENT)
Dept: HOME HEALTH SERVICES | Facility: HOME HEALTHCARE | Age: 28
End: 2018-01-06
Payer: MEDICARE

## 2018-01-06 VITALS
SYSTOLIC BLOOD PRESSURE: 137 MMHG | RESPIRATION RATE: 20 BRPM | TEMPERATURE: 98.2 F | DIASTOLIC BLOOD PRESSURE: 96 MMHG | HEART RATE: 77 BPM | OXYGEN SATURATION: 98 %

## 2018-01-06 PROCEDURE — 665998 HH PPS REVENUE CREDIT

## 2018-01-06 PROCEDURE — 665999 HH PPS REVENUE DEBIT

## 2018-01-06 PROCEDURE — G0156 HHCP-SVS OF AIDE,EA 15 MIN: HCPCS

## 2018-01-07 PROCEDURE — 665998 HH PPS REVENUE CREDIT

## 2018-01-07 PROCEDURE — 665999 HH PPS REVENUE DEBIT

## 2018-01-07 SDOH — ECONOMIC STABILITY: HOUSING INSECURITY: UNSAFE COOKING RANGE AREA: 0

## 2018-01-07 SDOH — ECONOMIC STABILITY: HOUSING INSECURITY: UNSAFE APPLIANCES: 0

## 2018-01-08 ENCOUNTER — HOME CARE VISIT (OUTPATIENT)
Dept: HOME HEALTH SERVICES | Facility: HOME HEALTHCARE | Age: 28
End: 2018-01-08
Payer: MEDICARE

## 2018-01-08 VITALS
TEMPERATURE: 97.8 F | HEART RATE: 80 BPM | RESPIRATION RATE: 18 BRPM | OXYGEN SATURATION: 98 % | SYSTOLIC BLOOD PRESSURE: 127 MMHG | DIASTOLIC BLOOD PRESSURE: 75 MMHG

## 2018-01-08 VITALS
OXYGEN SATURATION: 98 % | HEART RATE: 80 BPM | TEMPERATURE: 97.8 F | DIASTOLIC BLOOD PRESSURE: 80 MMHG | RESPIRATION RATE: 18 BRPM | SYSTOLIC BLOOD PRESSURE: 136 MMHG

## 2018-01-08 PROCEDURE — G0299 HHS/HOSPICE OF RN EA 15 MIN: HCPCS

## 2018-01-08 PROCEDURE — 665999 HH PPS REVENUE DEBIT

## 2018-01-08 PROCEDURE — G0156 HHCP-SVS OF AIDE,EA 15 MIN: HCPCS

## 2018-01-08 PROCEDURE — G0151 HHCP-SERV OF PT,EA 15 MIN: HCPCS

## 2018-01-08 PROCEDURE — 665998 HH PPS REVENUE CREDIT

## 2018-01-08 ASSESSMENT — ENCOUNTER SYMPTOMS
VOMITING: DENIES
RESPIRATORY SYMPTOMS COMMENTS: NO CONCERNS AT THE TIME OF THIS ASSESSMENT.
DEBILITATING PAIN: 1
DIFFICULTY THINKING: 1
DEPRESSED MOOD: 1
AGORAPHOBIA: 1

## 2018-01-08 ASSESSMENT — ACTIVITIES OF DAILY LIVING (ADL): TRANSPORTATION COMMENTS: FALL RISK

## 2018-01-09 ENCOUNTER — HOME CARE VISIT (OUTPATIENT)
Dept: HOME HEALTH SERVICES | Facility: HOME HEALTHCARE | Age: 28
End: 2018-01-09
Payer: MEDICARE

## 2018-01-09 ENCOUNTER — OFFICE VISIT (OUTPATIENT)
Dept: MEDICAL GROUP | Facility: MEDICAL CENTER | Age: 28
End: 2018-01-09
Payer: MEDICARE

## 2018-01-09 VITALS
OXYGEN SATURATION: 98 % | RESPIRATION RATE: 18 BRPM | SYSTOLIC BLOOD PRESSURE: 127 MMHG | DIASTOLIC BLOOD PRESSURE: 75 MMHG | HEART RATE: 80 BPM | TEMPERATURE: 97.8 F

## 2018-01-09 VITALS
HEIGHT: 65 IN | TEMPERATURE: 98.1 F | BODY MASS INDEX: 46.82 KG/M2 | RESPIRATION RATE: 16 BRPM | OXYGEN SATURATION: 97 % | SYSTOLIC BLOOD PRESSURE: 126 MMHG | DIASTOLIC BLOOD PRESSURE: 72 MMHG | WEIGHT: 281 LBS | HEART RATE: 87 BPM

## 2018-01-09 DIAGNOSIS — R19.7 DIARRHEA, UNSPECIFIED TYPE: ICD-10-CM

## 2018-01-09 DIAGNOSIS — R11.0 NAUSEA: ICD-10-CM

## 2018-01-09 DIAGNOSIS — G47.33 OSA (OBSTRUCTIVE SLEEP APNEA): ICD-10-CM

## 2018-01-09 DIAGNOSIS — E11.9 TYPE 2 DIABETES MELLITUS WITHOUT COMPLICATION, WITHOUT LONG-TERM CURRENT USE OF INSULIN (HCC): ICD-10-CM

## 2018-01-09 DIAGNOSIS — E03.9 ACQUIRED HYPOTHYROIDISM: ICD-10-CM

## 2018-01-09 PROCEDURE — 665998 HH PPS REVENUE CREDIT

## 2018-01-09 PROCEDURE — 99214 OFFICE O/P EST MOD 30 MIN: CPT | Performed by: INTERNAL MEDICINE

## 2018-01-09 PROCEDURE — 665999 HH PPS REVENUE DEBIT

## 2018-01-09 NOTE — PROGRESS NOTES
CC: Diarrhea and other issues.    HPI:   Kristin presents today with the following.    1. Diarrhea, unspecified type  Main complaint is diarrhea for the past 8 days. She denies any fevers or chills does have some mild nausea. She has no abdominal pain but does reports mild abdominal cramping intermittently. No recent antibiotics but has had some past month. Denies any black stool or blood in stool.     2. Nausea  One episode of vomiting nothing recurrent.    3. Acquired hypothyroidism  Maintain on medication coming due for recheck of thyroid.    4. Type 2 diabetes mellitus without complication, without long-term current use of insulin (CMS-HCC)  No longer on steroids which was most of the issue she is coming due for recheck of blood sugars.    5. TONYA (obstructive sleep apnea)  No longer on CPAP she reports her snoring is getting worse and she is not sleeping well or feeling rested in the morning. She denies any lower extremity edema no chest pain.      Patient Active Problem List    Diagnosis Date Noted   • Sinus tachycardia 10/31/2013     Priority: High   • Chronic inflammatory arthritis 05/23/2016     Priority: Medium   • Depression 10/28/2016     Priority: Low   • Schizophrenia (CMS-[facility]) 10/27/2016     Priority: Low   • Psychosis, schizophrenia, simple ([facility]) 09/29/2016     Priority: Low     Class: Chronic   • Acquired hypothyroidism 11/23/2015     Priority: Low   • PCOS (polycystic ovarian syndrome) 11/23/2015     Priority: Low   • Progressive focal motor weakness 06/28/2015     Priority: Low   • Fatty liver disease, nonalcoholic 01/19/2015     Priority: Low   • Anxiety 12/16/2014     Priority: Low   • Knee pain, right 02/13/2014     Priority: Low   • Neurogenic bladder 04/02/2011     Priority: Low   • Chronic UTI 09/18/2010     Priority: Low   • Borderline personality disorder in adult 09/18/2010     Priority: Low   • TONYA (obstructive sleep apnea) 01/09/2018   • Functional diarrhea 01/05/2018   • Breast wound  11/06/2017   • Morbid obesity with BMI of 45.0-49.9, adult ([facility]) 10/24/2017   • Intractable episodic cluster headache 09/14/2017   • Rash 06/09/2017   • Hashimoto's encephalopathy 05/17/2017   • Fall at home 05/13/2017   • Type 2 diabetes mellitus without complication, without long-term current use of insulin (CMS-HCC) 04/26/2017   • On home oxygen therapy 04/15/2017   • Chest pain 03/30/2017   • Weakness of right upper extremity 02/23/2017   • Chronic suprapubic catheter (CMS-HCC) 02/16/2017   • Hypovitaminosis D 11/29/2016   • HTN (hypertension) 11/01/2016   • Chronic pain syndrome 10/27/2016   • Bowel and bladder incontinence 10/27/2016   • Galactorrhea 07/22/2016   • Elevated sedimentation rate 06/27/2016   • Weakness of both lower extremities 06/22/2016   • Vitamin D deficiency 05/21/2016   • Morbidly obese (CMS-HCC) 03/07/2016   • Scoliosis 03/07/2016   • GERD (gastroesophageal reflux disease) 03/07/2016   • Peripheral neuropathy (CMS-HCC) 03/06/2016   • H/O prior ablation treatment 02/10/2016       Current Outpatient Prescriptions   Medication Sig Dispense Refill   • furosemide (LASIX) 40 MG Tab Take 40 mg by mouth 2 Times a Day.     • fesoterodine fumarate (TOVIAZ) 4 MG TABLET SR 24 HR Take 1 Tab by mouth every day. 90 Tab 3   • oxycodone-acetaminophen (PERCOCET-10)  MG Tab Take 1 Tab by mouth 2 times a day as needed for Severe Pain. take 1/2 pill every 2-3 days     • Diclofenac Sodium 1 % Gel Apply 1 Application to affected area(s) 1 time daily as needed (pain). alternates between this and .3     • promethazine (PHENERGAN) 25 MG Tab Take 25 mg by mouth every 6 hours as needed for Nausea/Vomiting.     • ziprasidone (GEODON) 80 MG Cap TAKE 2 CAPSULES BY MOUTH NIGHTLY AT BEDTIME 60 Cap 4   • albuterol 108 (90 Base) MCG/ACT Aero Soln inhalation aerosol Inhale 2 Puffs by mouth every 6 hours as needed for Shortness of Breath. 8.5 g 1   • ondansetron (ZOFRAN ODT) 4 MG TABLET DISPERSIBLE Take 1 Tab by  mouth every 6 hours as needed for Nausea. (Patient not taking: Reported on 12/13/2017) 30 Tab 6   • gabapentin (NEURONTIN) 600 MG tablet Take 1 Tab by mouth 3 times a day. 270 Tab 3   • trazodone (DESYREL) 50 MG Tab Take 1 Tab by mouth every bedtime. 30 Tab 11   • omeprazole (PRILOSEC) 20 MG delayed-release capsule Take 1 Cap by mouth every day. 30 Cap 5   • fluoxetine (PROZAC) 10 MG Cap TAKE ONE CAPSULE BY MOUTH ONCE A DAY 30 Cap 2   • ivabradine (CORLANOR) 5 MG Tab tablet Take 1 Tab by mouth 2 times a day, with meals. 60 Tab 2   • sumatriptan (IMITREX) 20 MG/ACT nasal spray Spray 1 Spray in nose as needed for Migraine. 10 Each 11   • fluticasone (FLONASE) 50 MCG/ACT nasal spray Spray 2 Sprays in nose every day. 16 g 11   • loratadine (CLARITIN) 10 MG Tab Take 1 Tab by mouth every day. 30 Tab    • sitagliptin (JANUVIA) 100 MG Tab Take 1 Tab by mouth every day. (Patient taking differently: Take 50 mg by mouth every day.) 30 Tab 6   • levothyroxine (SYNTHROID) 75 MCG Tab Take 75 mcg by mouth Every morning on an empty stomach.     • lactulose 10 GM/15ML Solution Take 10 g by mouth 2 times a day as needed (For constipation).     • aspirin EC (ECOTRIN) 81 MG Tablet Delayed Response Take 1 Tab by mouth every day. 30 Tab 6   • baclofen (LIORESAL) 10 MG Tab Take 1 Tab by mouth 3 times a day. 90 Tab 6   • Melatonin 5 MG Tab Take 10 mg by mouth every bedtime.       No current facility-administered medications for this visit.          Allergies as of 01/09/2018 - Reviewed 01/09/2018   Allergen Reaction Noted   • Cefdinir Shortness of Breath and Itching 03/01/2016   • Depakote [divalproex sodium] Unspecified 06/14/2010   • Abilify Unspecified 01/17/2013   • Amitriptyline Unspecified 10/31/2013   • Amoxicillin Rash 09/18/2010   • Ciprofloxacin Rash 12/17/2009   • Clindamycin Nausea 02/02/2011   • Ees [erythromycin] Vomiting and Nausea 08/28/2010   • Flagyl [metronidazole hcl] Unspecified 03/31/2011   • Flomax [tamsulosin  "hydrochloride] Swelling 09/24/2009   • Metformin Unspecified 07/23/2013   • Sulfa drugs Hives and Rash 09/18/2010   • Tape Rash 08/15/2012   • Vancomycin Itching 07/10/2016   • Cephalexin [keflex] Rash 01/01/2017   • Erythromycin Rash 03/30/2017   • Levofloxacin Unspecified 10/27/2016   • Metronidazole Rash 03/30/2017   • Valproic acid Rash 03/30/2017        ROS: As per HPI.    /72   Pulse 87   Temp 36.7 °C (98.1 °F)   Resp 16   Ht 1.651 m (5' 5\")   Wt (!) 127.5 kg (281 lb)   SpO2 97%   BMI 46.76 kg/m²     Physical Exam:  Gen:         Alert and oriented, No apparent distress.  Neck:        No Lymphadenopathy or Bruits.  Lungs:     Clear to auscultation bilaterally  CV:          Regular rate and rhythm. No murmurs, rubs or gallops.               Ext:          No clubbing, cyanosis, edema.      Assessment and Plan.   27 y.o. female with the following issues.    1. Diarrhea, unspecified type  Recommendations for hydration and have sent for C. Difficile. suspect viral in nature..  - CDIFF BY PCR; Future    2. Nausea  Medications when necessary follow-up if not improving.    3. Acquired hypothyroidism  Rechecking blood work  - TSH; Future    4. Type 2 diabetes mellitus without complication, without long-term current use of insulin (CMS-HCC)  Discussion about diet recheck labs.  - COMP METABOLIC PANEL; Future  - LIPID PROFILE; Future  - HEMOGLOBIN A1C; Future    5. TONYA (obstructive sleep apnea)  Refer to pulmonology.  - REFERRAL TO PULMONOLOGY      "

## 2018-01-10 ENCOUNTER — HOME CARE VISIT (OUTPATIENT)
Dept: HOME HEALTH SERVICES | Facility: HOME HEALTHCARE | Age: 28
End: 2018-01-10
Payer: MEDICARE

## 2018-01-10 VITALS
TEMPERATURE: 98.1 F | RESPIRATION RATE: 18 BRPM | OXYGEN SATURATION: 99 % | SYSTOLIC BLOOD PRESSURE: 124 MMHG | HEART RATE: 78 BPM | DIASTOLIC BLOOD PRESSURE: 70 MMHG

## 2018-01-10 PROCEDURE — 665998 HH PPS REVENUE CREDIT

## 2018-01-10 PROCEDURE — G0152 HHCP-SERV OF OT,EA 15 MIN: HCPCS

## 2018-01-10 PROCEDURE — G0180 MD CERTIFICATION HHA PATIENT: HCPCS | Performed by: INTERNAL MEDICINE

## 2018-01-10 PROCEDURE — 665999 HH PPS REVENUE DEBIT

## 2018-01-10 PROCEDURE — A6212 FOAM DRG <=16 SQ IN W/BORDER: HCPCS

## 2018-01-10 PROCEDURE — G0299 HHS/HOSPICE OF RN EA 15 MIN: HCPCS

## 2018-01-10 ASSESSMENT — ENCOUNTER SYMPTOMS
NAUSEA: DENIES
DEPRESSED MOOD: 1
RESPIRATORY SYMPTOMS COMMENTS: NO CONCERNS AT THE TIME OF THIS ASSESSMENT.
VOMITING: DENIES
POOR JUDGMENT: 1

## 2018-01-11 ENCOUNTER — OFFICE VISIT (OUTPATIENT)
Dept: URGENT CARE | Facility: CLINIC | Age: 28
End: 2018-01-11
Payer: MEDICARE

## 2018-01-11 ENCOUNTER — HOME CARE VISIT (OUTPATIENT)
Dept: HOME HEALTH SERVICES | Facility: HOME HEALTHCARE | Age: 28
End: 2018-01-11
Payer: MEDICARE

## 2018-01-11 ENCOUNTER — SUPERVISING PHYSICIAN REVIEW (OUTPATIENT)
Dept: URGENT CARE | Facility: CLINIC | Age: 28
End: 2018-01-11

## 2018-01-11 VITALS
TEMPERATURE: 98.6 F | SYSTOLIC BLOOD PRESSURE: 110 MMHG | HEART RATE: 81 BPM | WEIGHT: 281 LBS | RESPIRATION RATE: 16 BRPM | DIASTOLIC BLOOD PRESSURE: 60 MMHG | HEIGHT: 65 IN | OXYGEN SATURATION: 96 % | BODY MASS INDEX: 46.82 KG/M2

## 2018-01-11 VITALS
TEMPERATURE: 97.6 F | HEART RATE: 69 BPM | SYSTOLIC BLOOD PRESSURE: 137 MMHG | DIASTOLIC BLOOD PRESSURE: 93 MMHG | OXYGEN SATURATION: 98 % | RESPIRATION RATE: 18 BRPM

## 2018-01-11 VITALS
TEMPERATURE: 98.1 F | SYSTOLIC BLOOD PRESSURE: 124 MMHG | RESPIRATION RATE: 18 BRPM | HEART RATE: 78 BPM | DIASTOLIC BLOOD PRESSURE: 70 MMHG | OXYGEN SATURATION: 99 %

## 2018-01-11 DIAGNOSIS — T63.304A SPIDER BITE WOUND, UNDETERMINED INTENT, INITIAL ENCOUNTER: ICD-10-CM

## 2018-01-11 PROCEDURE — 665998 HH PPS REVENUE CREDIT

## 2018-01-11 PROCEDURE — G0151 HHCP-SERV OF PT,EA 15 MIN: HCPCS

## 2018-01-11 PROCEDURE — 99214 OFFICE O/P EST MOD 30 MIN: CPT | Performed by: NURSE PRACTITIONER

## 2018-01-11 PROCEDURE — 665999 HH PPS REVENUE DEBIT

## 2018-01-11 PROCEDURE — G0156 HHCP-SVS OF AIDE,EA 15 MIN: HCPCS

## 2018-01-11 RX ORDER — DOXYCYCLINE HYCLATE 100 MG
100 TABLET ORAL 2 TIMES DAILY
Qty: 14 TAB | Refills: 0 | Status: SHIPPED | OUTPATIENT
Start: 2018-01-11 | End: 2018-01-13

## 2018-01-11 ASSESSMENT — ACTIVITIES OF DAILY LIVING (ADL): TRANSPORTATION COMMENTS: FALL RISK

## 2018-01-11 ASSESSMENT — ENCOUNTER SYMPTOMS
SHORTNESS OF BREATH: 0
POOR JUDGMENT: 1
DIZZINESS: 0
CHILLS: 0
NAUSEA: 0
FEVER: 0
VOMITING: 0
EYE PAIN: 0
MYALGIAS: 0
SORE THROAT: 0
COUGH: 0

## 2018-01-12 ENCOUNTER — HOME CARE VISIT (OUTPATIENT)
Dept: HOME HEALTH SERVICES | Facility: HOME HEALTHCARE | Age: 28
End: 2018-01-12
Payer: MEDICARE

## 2018-01-12 VITALS
DIASTOLIC BLOOD PRESSURE: 93 MMHG | OXYGEN SATURATION: 98 % | TEMPERATURE: 97.6 F | SYSTOLIC BLOOD PRESSURE: 137 MMHG | RESPIRATION RATE: 18 BRPM | HEART RATE: 69 BPM

## 2018-01-12 PROCEDURE — G0299 HHS/HOSPICE OF RN EA 15 MIN: HCPCS

## 2018-01-12 PROCEDURE — 665998 HH PPS REVENUE CREDIT

## 2018-01-12 PROCEDURE — 665999 HH PPS REVENUE DEBIT

## 2018-01-12 NOTE — PROGRESS NOTES
"  Subjective:     Kristin Balderrama is a 27 y.o. female who presents for Breast Pain (Lt x 3 days)  Per the patient she was bitten by a black  one month ago. Patient states that her house is infested with black 's and she has multiple spider bites on her body. Patient states that the left breast wound was healing but popped open and now is gotten worse. She denies any fevers, chills.     Other   This is a new problem. The current episode started in the past 7 days. The problem occurs constantly. The problem has been gradually worsening. Associated symptoms include a rash. Pertinent negatives include no chest pain, chills, congestion, coughing, fever, myalgias, nausea, sore throat or vomiting. Nothing aggravates the symptoms. She has tried nothing for the symptoms. The treatment provided no relief.     Past Medical History:   Diagnosis Date   • Anginal syndrome     random chest pain especially with tachycardia   • Arrhythmia     \"sinus tachycardia\", cariologist, Dr. Kumar; ablation 2/2016   • Arthritis     osteo   • ASTHMA     when around smoke   • Borderline personality disorder    • Breath shortness     with tachycardia   • Chronic UTI 9/18/2010   • Disorder of thyroid    • Fall    • Gynecological disorder     PCOS   • Headache(784.0)    • Heart burn    • History of falling    • Hypertension    • Migraine    • 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CMS-HCC)    • Multiple personality disorder    • Obesity    • Pain 08-15-12    back, 7/10   • PCOS (polycystic ovarian syndrome)    • Pneumonia 2012   • Psychosis    • Renal disorder     \"kidney disease, stage 1\" nephrologist, Dr. 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   • Urinary bladder disorder     Suprapubic cath   • Urinary incontinence      Past Surgical History:   Procedure Laterality Date   • MUSCLE BIOPSY Right " 1/26/2017    Procedure: MUSCLE BIOPSY - THIGH;  Surgeon: Isidro Vigil M.D.;  Location: SURGERY [facility];  Service:    • GASTROSCOPY WITH BALLOON DILATATION N/A 1/4/2017    Procedure: GASTROSCOPY WITH DILATATION;  Surgeon: Torres Vargas M.D.;  Location: SURGERY [facility];  Service:    • BOWEL STIMULATOR INSERTION  7/13/2016    Procedure: BOWEL STIMULATOR INSERTION FOR PERMANENT INTERSTIM SACRAL IMPLANT;  Surgeon: Joe Noyola M.D.;  Location: SURGERY [facility];  Service:    • RECOVERY  1/27/2016    Procedure: CATH LAB EP STUDY WITH SINUS NODE MODIFICATION ABHINAV;  Surgeon: [facility];  Location: SURGERY PRE-POST PROC UNIT [facility];  Service:    • OTHER CARDIAC SURGERY  1/2016    cardiac ablation   • NEURO DEST FACET L/S W/IG SNGL  4/21/2015    Performed by Reza Tabor at SURGERY [facility]   • LUMBAR FUSION ANTERIOR  8/21/2012    Performed by SUSIE SAWANT at SURGERY [facility]   • ALYSSA BY LAPAROSCOPY  8/29/2010    Performed by SHAYY JOHNS at SURGERY [facility] ORS   • OTHER ABDOMINAL SURGERY     • TONSILLECTOMY      tonsillectomy     Social History     Social History   • Marital status: Single     Spouse name: N/A   • Number of children: N/A   • Years of education: N/A     Occupational History   • Not on file.     Social History Main Topics   • Smoking status: Never Smoker   • Smokeless tobacco: Never Used   • Alcohol use No   • Drug use:      Types: Marijuana   • Sexual activity: Not Currently     Birth control/ protection: Pill     Other Topics Concern   • Not on file     Social History Narrative    ** Merged History Encounter **           Family History   Problem Relation Age of Onset   • Hypertension Mother    • Sleep Apnea Mother    • Heart Disease Mother    • Other Mother      hypothryod   • Hypertension Maternal Uncle    • Heart Disease Maternal Grandmother    • Hypertension Maternal Grandmother    • Other Sister      Narcolepsy;fibromyalsia;bone;nerve   •  "Genitourinary () Sister      endometriosis    Review of Systems   Constitutional: Negative for chills and fever.   HENT: Negative for congestion and sore throat.    Eyes: Negative for pain.   Respiratory: Negative for cough and shortness of breath.    Cardiovascular: Negative for chest pain.   Gastrointestinal: Negative for nausea and vomiting.   Genitourinary: Negative for hematuria.   Musculoskeletal: Negative for myalgias.   Skin: Positive for rash.   Neurological: Negative for dizziness.     Allergies   Allergen Reactions   • Cefdinir Shortness of Breath and Itching     Tolerated 1/18/17   • Depakote [Divalproex Sodium] Unspecified     Muscle spasms/muscle pain and weakness     • Abilify Unspecified     Headaches/muscle twitching     • Amitriptyline Unspecified     Headaches     • Amoxicillin Rash     Pt states \"I get a rash\".     • Ciprofloxacin Rash     Pt states \"I get a rash\".     • Clindamycin Nausea     Even with food     • Ees [Erythromycin] Vomiting and Nausea   • Flagyl [Metronidazole Hcl] Unspecified     \"eye problems\"     • Flomax [Tamsulosin Hydrochloride] Swelling   • Metformin Unspecified     Increased lactic acid      • Sulfa Drugs Hives and Rash     RXN=since childhood   • Tape Rash     Tears skin off   coban with Tegaderm tape ok  RXN=ongoing   • Vancomycin Itching     Pt becomes flushed in face and chest.   RXN=7/10/16   • Cephalexin [Keflex] Rash     Pt states she gets a rash with this medication   • Erythromycin Rash     .   • Levofloxacin Unspecified     Leg muscle cramps   • Metronidazole Rash     .   • Valproic Acid Rash     .      Objective:   /60   Pulse 81   Temp 37 °C (98.6 °F)   Resp 16   Ht 1.651 m (5' 5\")   Wt (!) 127.5 kg (281 lb)   SpO2 96%   BMI 46.76 kg/m²   Physical Exam   Constitutional: She is oriented to person, place, and time. She appears well-developed and well-nourished. No distress.   HENT:   Head: Normocephalic and atraumatic.   Right Ear: Tympanic " membrane normal.   Left Ear: Tympanic membrane normal.   Nose: Nose normal. Right sinus exhibits no maxillary sinus tenderness and no frontal sinus tenderness. Left sinus exhibits no maxillary sinus tenderness and no frontal sinus tenderness.   Mouth/Throat: Uvula is midline, oropharynx is clear and moist and mucous membranes are normal. No posterior oropharyngeal edema, posterior oropharyngeal erythema or tonsillar abscesses. No tonsillar exudate.   Eyes: Conjunctivae and EOM are normal. Pupils are equal, round, and reactive to light. Right eye exhibits no discharge. Left eye exhibits no discharge.   Cardiovascular: Normal rate and regular rhythm.    No murmur heard.  Pulmonary/Chest: Effort normal and breath sounds normal. No respiratory distress.   Abdominal: Soft. She exhibits no distension. There is no tenderness.   Neurological: She is alert and oriented to person, place, and time. She has normal reflexes. No sensory deficit.   Skin: Skin is warm, dry and intact. There is erythema.        Circular ulcerated lesion of the left breast noted. No drainage, no pus. Erythema surrounding ulceration noted. Marked with a pen. Skin warm to the touch.   Psychiatric: She has a normal mood and affect.         Assessment/Plan:   Assessment    1. Spider bite wound, undetermined intent, initial encounter  - doxycycline (VIBRAMYCIN) 100 MG Tab; Take 1 Tab by mouth 2 times a day for 7 days.  Dispense: 14 Tab; Refill: 0    Erythema marked with a marker. Patient advised to return to clinic if redness worsens. Due to patient's significant amount of allergies to antibiotics doxycycline is appropriate antibiotic she can tolerate.     Patient given precautionary s/sx that mandate immediate follow up and evaluation in the ED. Advised of risks of not doing so.    DDX, Supportive care, and indications for immediate follow-up discussed with patient.    Instructed to return to clinic or nearest emergency department if we are not available  for any change in condition, further concerns, or worsening of symptoms.    The patient demonstrated a good understanding and agreed with the treatment plan.

## 2018-01-13 ENCOUNTER — HOME CARE VISIT (OUTPATIENT)
Dept: HOME HEALTH SERVICES | Facility: HOME HEALTHCARE | Age: 28
End: 2018-01-13
Payer: MEDICARE

## 2018-01-13 ENCOUNTER — OFFICE VISIT (OUTPATIENT)
Dept: URGENT CARE | Facility: CLINIC | Age: 28
End: 2018-01-13
Payer: MEDICARE

## 2018-01-13 VITALS
HEART RATE: 87 BPM | BODY MASS INDEX: 46.82 KG/M2 | OXYGEN SATURATION: 98 % | SYSTOLIC BLOOD PRESSURE: 134 MMHG | RESPIRATION RATE: 18 BRPM | DIASTOLIC BLOOD PRESSURE: 82 MMHG | HEIGHT: 65 IN | WEIGHT: 281 LBS | TEMPERATURE: 98 F

## 2018-01-13 DIAGNOSIS — T50.905A ADVERSE EFFECT OF DRUG, INITIAL ENCOUNTER: ICD-10-CM

## 2018-01-13 DIAGNOSIS — L08.9 SKIN INFECTION: ICD-10-CM

## 2018-01-13 PROCEDURE — 99214 OFFICE O/P EST MOD 30 MIN: CPT | Performed by: FAMILY MEDICINE

## 2018-01-13 PROCEDURE — 665999 HH PPS REVENUE DEBIT

## 2018-01-13 PROCEDURE — 665998 HH PPS REVENUE CREDIT

## 2018-01-13 RX ORDER — PROMETHAZINE HYDROCHLORIDE 25 MG/1
25 TABLET ORAL EVERY 6 HOURS PRN
Qty: 15 TAB | Refills: 0 | Status: SHIPPED | OUTPATIENT
Start: 2018-01-13 | End: 2018-03-15

## 2018-01-13 RX ORDER — CLINDAMYCIN HYDROCHLORIDE 300 MG/1
300 CAPSULE ORAL 3 TIMES DAILY
Qty: 15 CAP | Refills: 0 | Status: SHIPPED | OUTPATIENT
Start: 2018-01-13 | End: 2018-01-18

## 2018-01-13 ASSESSMENT — ENCOUNTER SYMPTOMS
FEVER: 0
ORTHOPNEA: 0
CHILLS: 0
HEMOPTYSIS: 0
SHORTNESS OF BREATH: 0
FOCAL WEAKNESS: 0
DIZZINESS: 0

## 2018-01-14 PROCEDURE — 665999 HH PPS REVENUE DEBIT

## 2018-01-14 PROCEDURE — 665998 HH PPS REVENUE CREDIT

## 2018-01-14 NOTE — PROGRESS NOTES
"Subjective:      Kristin Balderrama is a 27 y.o. female who presents with Allergic Reaction (doxycycline)    Chief Complaint   Patient presents with   • Allergic Reaction     doxycycline        - This is a very pleasant 27 y.o. female with complaints of Rx given from last visit makes her a little itchy.           ALLERGIES:  Cefdinir; Depakote [divalproex sodium]; Abilify; Amitriptyline; Amoxicillin; Ciprofloxacin; Clindamycin; Doxycycline; Ees [erythromycin]; Flagyl [metronidazole hcl]; Flomax [tamsulosin hydrochloride]; Metformin; Sulfa drugs; Tape; Vancomycin; Cephalexin [keflex]; Erythromycin; Levofloxacin; Metronidazole; and Valproic acid     PMH:  Past Medical History:   Diagnosis Date   • Anginal syndrome     random chest pain especially with tachycardia   • Arrhythmia     \"sinus tachycardia\", cariologist, Dr. Kumar; ablation 2/2016   • Arthritis     osteo   • ASTHMA     when around smoke   • Borderline personality disorder    • Breath shortness     with tachycardia   • Chronic UTI 9/18/2010   • Disorder of thyroid    • Fall    • Gynecological disorder     PCOS   • Headache(784.0)    • Heart burn    • History of falling    • Hypertension    • Migraine    • 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CMS-HCC)    • Multiple personality disorder    • Obesity    • Pain 08-15-12    back, 7/10   • PCOS (polycystic ovarian syndrome)    • Pneumonia 2012   • Psychosis    • Renal disorder     \"kidney disease, stage 1\" nephrologist, Dr. 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   • Urinary bladder disorder     Suprapubic cath   • Urinary incontinence         MEDS:    Current Outpatient Prescriptions:   •  clindamycin (CLEOCIN) 300 MG Cap, Take 1 Cap by mouth 3 times a day for 5 days., Disp: 15 Cap, Rfl: 0  •  mupirocin (BACTROBAN) 2 % Ointment, Apply 1 Application to affected area(s) 3 times a " day for 7 days., Disp: 30 g, Rfl: 2  •  promethazine (PHENERGAN) 25 MG Tab, Take 1 Tab by mouth every 6 hours as needed for Nausea/Vomiting., Disp: 15 Tab, Rfl: 0  •  non-formulary med, Take 1 Dose by mouth every four hours as needed (pain). marijuana sugar 1/4 teaspoon every 4 hrs as needed for pain, Disp: , Rfl:   •  non-formulary med, Spray 2-3 L/min in nose at bedtime as needed (shortness of breath). oxygen via nasal cannula 2 - 3L/min, Disp: , Rfl:   •  furosemide (LASIX) 40 MG Tab, Take 40 mg by mouth 2 Times a Day., Disp: , Rfl:   •  fesoterodine fumarate (TOVIAZ) 4 MG TABLET SR 24 HR, Take 1 Tab by mouth every day., Disp: 90 Tab, Rfl: 3  •  oxycodone-acetaminophen (PERCOCET-10)  MG Tab, Take 1 Tab by mouth 2 times a day as needed for Severe Pain. take 1/2 pill every 2-3 days, Disp: , Rfl:   •  Diclofenac Sodium 1 % Gel, Apply 1 Application to affected area(s) 1 time daily as needed (pain). alternates between this and .3, Disp: , Rfl:   •  ziprasidone (GEODON) 80 MG Cap, TAKE 2 CAPSULES BY MOUTH NIGHTLY AT BEDTIME, Disp: 60 Cap, Rfl: 4  •  albuterol 108 (90 Base) MCG/ACT Aero Soln inhalation aerosol, Inhale 2 Puffs by mouth every 6 hours as needed for Shortness of Breath., Disp: 8.5 g, Rfl: 1  •  ondansetron (ZOFRAN ODT) 4 MG TABLET DISPERSIBLE, Take 1 Tab by mouth every 6 hours as needed for Nausea., Disp: 30 Tab, Rfl: 6  •  gabapentin (NEURONTIN) 600 MG tablet, Take 1 Tab by mouth 3 times a day., Disp: 270 Tab, Rfl: 3  •  trazodone (DESYREL) 50 MG Tab, Take 1 Tab by mouth every bedtime., Disp: 30 Tab, Rfl: 11  •  omeprazole (PRILOSEC) 20 MG delayed-release capsule, Take 1 Cap by mouth every day., Disp: 30 Cap, Rfl: 5  •  fluoxetine (PROZAC) 10 MG Cap, TAKE ONE CAPSULE BY MOUTH ONCE A DAY, Disp: 30 Cap, Rfl: 2  •  ivabradine (CORLANOR) 5 MG Tab tablet, Take 1 Tab by mouth 2 times a day, with meals., Disp: 60 Tab, Rfl: 2  •  sumatriptan (IMITREX) 20 MG/ACT nasal spray, Spray 1 Spray in nose as  "needed for Migraine., Disp: 10 Each, Rfl: 11  •  fluticasone (FLONASE) 50 MCG/ACT nasal spray, Spray 2 Sprays in nose every day., Disp: 16 g, Rfl: 11  •  loratadine (CLARITIN) 10 MG Tab, Take 1 Tab by mouth every day., Disp: 30 Tab, Rfl:   •  sitagliptin (JANUVIA) 100 MG Tab, Take 1 Tab by mouth every day. (Patient taking differently: Take 50 mg by mouth every day.), Disp: 30 Tab, Rfl: 6  •  levothyroxine (SYNTHROID) 75 MCG Tab, Take 75 mcg by mouth Every morning on an empty stomach., Disp: , Rfl:   •  lactulose 10 GM/15ML Solution, Take 10 g by mouth 2 times a day as needed (For constipation)., Disp: , Rfl:   •  aspirin EC (ECOTRIN) 81 MG Tablet Delayed Response, Take 1 Tab by mouth every day., Disp: 30 Tab, Rfl: 6  •  baclofen (LIORESAL) 10 MG Tab, Take 1 Tab by mouth 3 times a day., Disp: 90 Tab, Rfl: 6  •  Melatonin 5 MG Tab, Take 10 mg by mouth every bedtime., Disp: , Rfl:     ** I have documented what I find to be significant in regards to past medical, social, family and surgical history  in my HPI or under PMH/PSH/FH review section, otherwise it is contributory **           HPI    Review of Systems   Constitutional: Negative for chills and fever.   Respiratory: Negative for hemoptysis and shortness of breath.    Cardiovascular: Negative for chest pain and orthopnea.   Neurological: Negative for dizziness and focal weakness.          Objective:     /82   Pulse 87   Temp 36.7 °C (98 °F)   Resp 18   Ht 1.651 m (5' 5\")   Wt (!) 127.5 kg (281 lb)   SpO2 98%   BMI 46.76 kg/m²      Physical Exam   Constitutional: She appears well-developed. No distress.   HENT:   Head: Normocephalic and atraumatic.   Eyes: Conjunctivae are normal.   Neck: Neck supple.   Cardiovascular: Regular rhythm.    No murmur heard.  Pulmonary/Chest: Effort normal. No respiratory distress.   Neurological: She is alert. She exhibits normal muscle tone.   Skin: Skin is warm and dry.   Psychiatric: She has a normal mood and affect. " Judgment normal.   Nursing note and vitals reviewed.  Lt breast: !2x3cm area of erythema.             Assessment/Plan:         1. Skin infection  clindamycin (CLEOCIN) 300 MG Cap    mupirocin (BACTROBAN) 2 % Ointment    promethazine (PHENERGAN) 25 MG Tab   2. Adverse effect of drug, initial encounter               Dx & d/c instructions discussed w/ patient and/or family members. Follow up w/ Prvt Dr or here in 3-4 days if not getting better, sooner if needed,  ER if worse and UC/PCP unavailable.        Possible side effects (i.e. Rash, GI upset/constipation, sedation, elevation of BP or sugars) of any medications given discussed.

## 2018-01-15 ENCOUNTER — HOME CARE VISIT (OUTPATIENT)
Dept: HOME HEALTH SERVICES | Facility: HOME HEALTHCARE | Age: 28
End: 2018-01-15
Payer: MEDICARE

## 2018-01-15 VITALS
SYSTOLIC BLOOD PRESSURE: 136 MMHG | DIASTOLIC BLOOD PRESSURE: 95 MMHG | RESPIRATION RATE: 18 BRPM | HEART RATE: 74 BPM | OXYGEN SATURATION: 98 % | TEMPERATURE: 98.2 F

## 2018-01-15 VITALS
OXYGEN SATURATION: 98 % | TEMPERATURE: 98.2 F | HEART RATE: 74 BPM | RESPIRATION RATE: 18 BRPM | SYSTOLIC BLOOD PRESSURE: 136 MMHG | DIASTOLIC BLOOD PRESSURE: 95 MMHG

## 2018-01-15 PROCEDURE — 665999 HH PPS REVENUE DEBIT

## 2018-01-15 PROCEDURE — A6402 STERILE GAUZE <= 16 SQ IN: HCPCS

## 2018-01-15 PROCEDURE — G0156 HHCP-SVS OF AIDE,EA 15 MIN: HCPCS

## 2018-01-15 PROCEDURE — G0151 HHCP-SERV OF PT,EA 15 MIN: HCPCS

## 2018-01-15 PROCEDURE — A6212 FOAM DRG <=16 SQ IN W/BORDER: HCPCS

## 2018-01-15 PROCEDURE — G0299 HHS/HOSPICE OF RN EA 15 MIN: HCPCS

## 2018-01-15 PROCEDURE — A6223 GAUZE >16<=48 NO W/SAL W/O B: HCPCS

## 2018-01-15 PROCEDURE — 665998 HH PPS REVENUE CREDIT

## 2018-01-15 ASSESSMENT — ENCOUNTER SYMPTOMS
DEPRESSED MOOD: 1
VOMITING: DENIES
RESPIRATORY SYMPTOMS COMMENTS: NO CONCERNS AT THE TIME OF THIS ASSESSMENT.

## 2018-01-16 VITALS
OXYGEN SATURATION: 97 % | TEMPERATURE: 98.2 F | DIASTOLIC BLOOD PRESSURE: 60 MMHG | RESPIRATION RATE: 18 BRPM | SYSTOLIC BLOOD PRESSURE: 124 MMHG | HEART RATE: 74 BPM

## 2018-01-16 VITALS
TEMPERATURE: 97.6 F | RESPIRATION RATE: 20 BRPM | HEART RATE: 72 BPM | OXYGEN SATURATION: 98 % | SYSTOLIC BLOOD PRESSURE: 134 MMHG | DIASTOLIC BLOOD PRESSURE: 78 MMHG

## 2018-01-16 PROCEDURE — 665998 HH PPS REVENUE CREDIT

## 2018-01-16 PROCEDURE — 665999 HH PPS REVENUE DEBIT

## 2018-01-16 ASSESSMENT — ENCOUNTER SYMPTOMS
NAUSEA: DENIES
DEPRESSED MOOD: 1
RESPIRATORY SYMPTOMS COMMENTS: DENIES SOB, RESP EVEN AND UNLABORED

## 2018-01-17 PROCEDURE — 665999 HH PPS REVENUE DEBIT

## 2018-01-17 PROCEDURE — 665998 HH PPS REVENUE CREDIT

## 2018-01-18 ENCOUNTER — HOME CARE VISIT (OUTPATIENT)
Dept: HOME HEALTH SERVICES | Facility: HOME HEALTHCARE | Age: 28
End: 2018-01-18
Payer: MEDICARE

## 2018-01-18 ENCOUNTER — TELEPHONE (OUTPATIENT)
Dept: MEDICAL GROUP | Facility: MEDICAL CENTER | Age: 28
End: 2018-01-18

## 2018-01-18 VITALS
DIASTOLIC BLOOD PRESSURE: 76 MMHG | RESPIRATION RATE: 16 BRPM | SYSTOLIC BLOOD PRESSURE: 136 MMHG | TEMPERATURE: 98.3 F | HEART RATE: 81 BPM | OXYGEN SATURATION: 94 %

## 2018-01-18 VITALS
DIASTOLIC BLOOD PRESSURE: 76 MMHG | HEART RATE: 81 BPM | SYSTOLIC BLOOD PRESSURE: 136 MMHG | OXYGEN SATURATION: 94 % | TEMPERATURE: 98.3 F | RESPIRATION RATE: 16 BRPM

## 2018-01-18 PROCEDURE — A6223 GAUZE >16<=48 NO W/SAL W/O B: HCPCS

## 2018-01-18 PROCEDURE — 665998 HH PPS REVENUE CREDIT

## 2018-01-18 PROCEDURE — A6402 STERILE GAUZE <= 16 SQ IN: HCPCS

## 2018-01-18 PROCEDURE — 665999 HH PPS REVENUE DEBIT

## 2018-01-18 PROCEDURE — G0151 HHCP-SERV OF PT,EA 15 MIN: HCPCS

## 2018-01-18 PROCEDURE — A6212 FOAM DRG <=16 SQ IN W/BORDER: HCPCS

## 2018-01-18 PROCEDURE — G0299 HHS/HOSPICE OF RN EA 15 MIN: HCPCS

## 2018-01-18 ASSESSMENT — ENCOUNTER SYMPTOMS
DEPRESSED MOOD: 1
POOR JUDGMENT: 1

## 2018-01-18 NOTE — TELEPHONE ENCOUNTER
1. Caller Name: Kristin                      Call Back Number: [phone] (home)       2. Message: patient got OTC hemorrhoid cream and she wanted to make sure she can use it being she is diabetic     3. Patient approves office to leave a detailed voicemail/MyChart message: N\A

## 2018-01-19 ENCOUNTER — HOME CARE VISIT (OUTPATIENT)
Dept: HOME HEALTH SERVICES | Facility: HOME HEALTHCARE | Age: 28
End: 2018-01-19
Payer: MEDICARE

## 2018-01-19 VITALS
OXYGEN SATURATION: 98 % | DIASTOLIC BLOOD PRESSURE: 73 MMHG | HEART RATE: 63 BPM | TEMPERATURE: 97.8 F | SYSTOLIC BLOOD PRESSURE: 119 MMHG | RESPIRATION RATE: 20 BRPM

## 2018-01-19 PROCEDURE — 665998 HH PPS REVENUE CREDIT

## 2018-01-19 PROCEDURE — 665999 HH PPS REVENUE DEBIT

## 2018-01-19 PROCEDURE — G0156 HHCP-SVS OF AIDE,EA 15 MIN: HCPCS

## 2018-01-20 PROCEDURE — 665999 HH PPS REVENUE DEBIT

## 2018-01-20 PROCEDURE — 665998 HH PPS REVENUE CREDIT

## 2018-01-21 PROCEDURE — 665999 HH PPS REVENUE DEBIT

## 2018-01-21 PROCEDURE — 665998 HH PPS REVENUE CREDIT

## 2018-01-22 ENCOUNTER — HOME CARE VISIT (OUTPATIENT)
Dept: HOME HEALTH SERVICES | Facility: HOME HEALTHCARE | Age: 28
End: 2018-01-22
Payer: MEDICARE

## 2018-01-22 VITALS
DIASTOLIC BLOOD PRESSURE: 74 MMHG | OXYGEN SATURATION: 99 % | TEMPERATURE: 99.8 F | SYSTOLIC BLOOD PRESSURE: 118 MMHG | RESPIRATION RATE: 20 BRPM | HEART RATE: 77 BPM

## 2018-01-22 PROCEDURE — 6650338

## 2018-01-22 PROCEDURE — A6402 STERILE GAUZE <= 16 SQ IN: HCPCS

## 2018-01-22 PROCEDURE — A6212 FOAM DRG <=16 SQ IN W/BORDER: HCPCS

## 2018-01-22 PROCEDURE — 665998 HH PPS REVENUE CREDIT

## 2018-01-22 PROCEDURE — 665999 HH PPS REVENUE DEBIT

## 2018-01-22 PROCEDURE — G0299 HHS/HOSPICE OF RN EA 15 MIN: HCPCS

## 2018-01-22 ASSESSMENT — ENCOUNTER SYMPTOMS: DEPRESSED MOOD: 1

## 2018-01-23 ENCOUNTER — OFFICE VISIT (OUTPATIENT)
Dept: BEHAVIORAL HEALTH | Facility: PHYSICIAN GROUP | Age: 28
End: 2018-01-23
Payer: MEDICARE

## 2018-01-23 ENCOUNTER — HOME CARE VISIT (OUTPATIENT)
Dept: HOME HEALTH SERVICES | Facility: HOME HEALTHCARE | Age: 28
End: 2018-01-23
Payer: MEDICARE

## 2018-01-23 VITALS
TEMPERATURE: 98.4 F | DIASTOLIC BLOOD PRESSURE: 93 MMHG | RESPIRATION RATE: 12 BRPM | OXYGEN SATURATION: 98 % | HEART RATE: 80 BPM | SYSTOLIC BLOOD PRESSURE: 133 MMHG

## 2018-01-23 VITALS
BODY MASS INDEX: 46.98 KG/M2 | HEIGHT: 65 IN | TEMPERATURE: 98.1 F | DIASTOLIC BLOOD PRESSURE: 76 MMHG | RESPIRATION RATE: 16 BRPM | SYSTOLIC BLOOD PRESSURE: 122 MMHG | WEIGHT: 282 LBS | HEART RATE: 86 BPM

## 2018-01-23 DIAGNOSIS — F25.1 SCHIZOAFFECTIVE DISORDER, DEPRESSIVE TYPE (HCC): ICD-10-CM

## 2018-01-23 DIAGNOSIS — F60.3 BORDERLINE PERSONALITY DISORDER (HCC): ICD-10-CM

## 2018-01-23 PROCEDURE — 665998 HH PPS REVENUE CREDIT

## 2018-01-23 PROCEDURE — 99213 OFFICE O/P EST LOW 20 MIN: CPT | Performed by: PSYCHIATRY & NEUROLOGY

## 2018-01-23 PROCEDURE — 665999 HH PPS REVENUE DEBIT

## 2018-01-23 PROCEDURE — 90833 PSYTX W PT W E/M 30 MIN: CPT | Performed by: PSYCHIATRY & NEUROLOGY

## 2018-01-23 PROCEDURE — G0156 HHCP-SVS OF AIDE,EA 15 MIN: HCPCS

## 2018-01-23 RX ORDER — FLUOXETINE 10 MG/1
CAPSULE ORAL
Qty: 30 CAP | Refills: 11 | Status: SHIPPED | OUTPATIENT
Start: 2018-01-23 | End: 2019-01-07 | Stop reason: SDUPTHER

## 2018-01-23 RX ORDER — ZIPRASIDONE HYDROCHLORIDE 80 MG/1
CAPSULE ORAL
Qty: 60 CAP | Refills: 11 | Status: SHIPPED | OUTPATIENT
Start: 2018-01-23 | End: 2018-07-14

## 2018-01-23 ASSESSMENT — PATIENT HEALTH QUESTIONNAIRE - PHQ9
1. LITTLE INTEREST OR PLEASURE IN DOING THINGS: 1
3. TROUBLE FALLING OR STAYING ASLEEP OR SLEEPING TOO MUCH: 1
8. MOVING OR SPEAKING SO SLOWLY THAT OTHER PEOPLE COULD HAVE NOTICED. OR THE OPPOSITE, BEING SO FIGETY OR RESTLESS THAT YOU HAVE BEEN MOVING AROUND A LOT MORE THAN USUAL: 0
4. FEELING TIRED OR HAVING LITTLE ENERGY: 1
SUM OF ALL RESPONSES TO PHQ9 QUESTIONS 1 AND 2: 2
5. POOR APPETITE OR OVEREATING: 0
2. FEELING DOWN, DEPRESSED, IRRITABLE, OR HOPELESS: 1
SUM OF ALL RESPONSES TO PHQ QUESTIONS 1-9: 4
6. FEELING BAD ABOUT YOURSELF - OR THAT YOU ARE A FAILURE OR HAVE LET YOURSELF OR YOUR FAMILY DOWN: 0
7. TROUBLE CONCENTRATING ON THINGS, SUCH AS READING THE NEWSPAPER OR WATCHING TELEVISION: 0
9. THOUGHTS THAT YOU WOULD BE BETTER OFF DEAD, OR OF HURTING YOURSELF: 0

## 2018-01-23 NOTE — PROGRESS NOTES
"[facility]  PSYCHIATRIC FOLLOW-UP NOTE    Name: Kristin Balderrama  MRN: [id number]  : 1990  Age: 27 y.o.  Date of assessment: 2018  PCP: Torres Brody M.D.  Persons in attendance: Patient  REASON FOR VISIT/CHIEF COMPLAINT (as stated by Patient):  Kristin Balderrama is a 27 y.o., White female, attending follow-up appointment for   Chief Complaint   Patient presents with   • Medication Management     I can come in every year because I can not afford the expense.   .      HISTORY OF PRESENT ILLNESS:    This is a 28 yo female, single, lives with her grand mother, seen today for follow up. The patient reported that she can come in once a year because it is expensive and she can not afford it. The patient gained weight because she has medical mrijuana card and she is using it . It is helping her with pain but increased her appetite and she gained weight. The patient has been falling down and she is using a walker.       PSYCHOSOCIAL CHANGES SINCE PREVIOUS CONTACT:  The patient denied psychosis and depression.    RESPONSE TO TREATMENT:  Geodon 80 mg one twice a day and fluoxetine 10 mg one in AM.    MEDICATION SIDE EFFECTS:  Weight gain.    REVIEW OF SYSTEMS:        Constitutional positive - obesity.    Eyes negative   Ears/Nose/Mouth/Throat negative   Cardiovascular positive - hypertension   Respiratory positive - obstructive sleep apnea   Gastrointestinal positive - fatty liver disease   Genitourinary positive - chronic UTI, polycystic ovarian syndrome.   Muscular negative   Integumentary negative   Neurological positive - neropathy   Endocrine positive - hypothyroidism, diabetes.   Hematologic/Lymphatic negative       PSYCHIATRIC EXAMINATION/MENTAL STATUS  /76   Pulse 86   Temp 36.7 °C (98.1 °F)   Resp 16   Ht 1.651 m (5' 5\")   Wt (!) 127.9 kg (282 lb)   BMI 46.93 kg/m²   Participation: Active verbal participation and Attentive  Grooming:Neat  Orientation: Alert and Fully " Oriented  Eye contact: Limited  Behavior:Calm   Mood: Euthymic  Affect: Flexible and Full range  Thought process: Logical and Goal-directed  Thought content:  Within normal limits  Speech: Rate within normal limits and Volume within normal limits  Perception:  Within normal limits  Memory:  No gross evidence of memory deficits  Insight: Good  Judgment: Good  Family/couple interaction observations:   Other:    Current risk:    Suicide: Low   Homicide: Low   Self-harm: Low  Relapse: Low  Other:   Crisis Safety Plan reviewed?Yes  If evidence of imminent risk is present, intervention/plan:    Medical Records/Labs/Diagnostic Tests Reviewed: yes.    Medical Records/Labs/Diagnostic Tests Ordered: none.    DIAGNOSTIC IMPRESSION(S):  1. Schizoaffective disorder, depressive type (CMS-HCC)    2. Borderline personality disorder           ASSESSMENT AND PLAN:  1. Schizoaffective disorder, depressed.  2. Borderline personality disorder  Ziprasidone 80 mg BID # 60 refills 11.  Fluoxetine 10 mg one a day # 30 refills 11.    3. Follow up as needed.  If symptoms get worse.     16 minutes were spent in psychotherapy.  (If greater than 16 minutes, add 77767 code)   Topics addressed in psychotherapy include:  The patient has a daily routine and keep a good sleep cycle.  Improving her self esteem.  Cognitive restructuring and behavioral changes.  Ronny Burnette M.D.

## 2018-01-24 ENCOUNTER — OFFICE VISIT (OUTPATIENT)
Dept: MEDICAL GROUP | Facility: MEDICAL CENTER | Age: 28
End: 2018-01-24
Payer: MEDICARE

## 2018-01-24 VITALS
SYSTOLIC BLOOD PRESSURE: 112 MMHG | DIASTOLIC BLOOD PRESSURE: 66 MMHG | HEIGHT: 65 IN | HEART RATE: 79 BPM | RESPIRATION RATE: 16 BRPM | TEMPERATURE: 98.5 F | BODY MASS INDEX: 46.65 KG/M2 | WEIGHT: 280 LBS | OXYGEN SATURATION: 92 %

## 2018-01-24 DIAGNOSIS — R13.19 ESOPHAGEAL DYSPHAGIA: ICD-10-CM

## 2018-01-24 DIAGNOSIS — K59.00 CONSTIPATION, UNSPECIFIED CONSTIPATION TYPE: ICD-10-CM

## 2018-01-24 PROCEDURE — 665998 HH PPS REVENUE CREDIT

## 2018-01-24 PROCEDURE — 99213 OFFICE O/P EST LOW 20 MIN: CPT | Performed by: INTERNAL MEDICINE

## 2018-01-24 PROCEDURE — 665999 HH PPS REVENUE DEBIT

## 2018-01-25 ENCOUNTER — HOME CARE VISIT (OUTPATIENT)
Dept: HOME HEALTH SERVICES | Facility: HOME HEALTHCARE | Age: 28
End: 2018-01-25
Payer: MEDICARE

## 2018-01-25 VITALS
TEMPERATURE: 97.7 F | SYSTOLIC BLOOD PRESSURE: 130 MMHG | DIASTOLIC BLOOD PRESSURE: 70 MMHG | RESPIRATION RATE: 16 BRPM | HEART RATE: 76 BPM | OXYGEN SATURATION: 96 %

## 2018-01-25 PROCEDURE — G0299 HHS/HOSPICE OF RN EA 15 MIN: HCPCS

## 2018-01-25 PROCEDURE — 665998 HH PPS REVENUE CREDIT

## 2018-01-25 PROCEDURE — 665999 HH PPS REVENUE DEBIT

## 2018-01-25 NOTE — PROGRESS NOTES
CC: Constipation and difficulty swallowing.    HPI:   Kristin presents today with the following.    1. Constipation, unspecified constipation type  Presents complaining of constipation having a bowel movement every 4-5 days. She is a longer on narcotics. She does report she is taking iron and symptoms began shortly after this. She is taking lactulose and Bhargavi lax with little success. She does very poor job of hydrating and reports she cannot drink water because it makes her nauseated.    2. Esophageal dysphagia  She does have a history of esophageal dilatation is reporting some difficulty swallowing. She does state that food sticks in her esophagus nothing that has been more than a few minutes however.      Patient Active Problem List    Diagnosis Date Noted   • Sinus tachycardia 10/31/2013     Priority: High   • Chronic inflammatory arthritis 05/23/2016     Priority: Medium   • Depression 10/28/2016     Priority: Low   • Schizophrenia (CMS-[facility]) 10/27/2016     Priority: Low   • Psychosis, schizophrenia, simple ([facility]) 09/29/2016     Priority: Low     Class: Chronic   • Acquired hypothyroidism 11/23/2015     Priority: Low   • PCOS (polycystic ovarian syndrome) 11/23/2015     Priority: Low   • Progressive focal motor weakness 06/28/2015     Priority: Low   • Fatty liver disease, nonalcoholic 01/19/2015     Priority: Low   • Anxiety 12/16/2014     Priority: Low   • Knee pain, right 02/13/2014     Priority: Low   • Neurogenic bladder 04/02/2011     Priority: Low   • Chronic UTI 09/18/2010     Priority: Low   • Borderline personality disorder in adult 09/18/2010     Priority: Low   • TONYA (obstructive sleep apnea) 01/09/2018   • Functional diarrhea 01/05/2018   • Breast wound 11/06/2017   • Morbid obesity with BMI of 45.0-49.9, adult ([facility]) 10/24/2017   • Intractable episodic cluster headache 09/14/2017   • Rash 06/09/2017   • Hashimoto's encephalopathy 05/17/2017   • Fall at home 05/13/2017   • Type 2 diabetes mellitus  without complication, without long-term current use of insulin (CMS-HCC) 04/26/2017   • On home oxygen therapy 04/15/2017   • Chest pain 03/30/2017   • Weakness of right upper extremity 02/23/2017   • Chronic suprapubic catheter (CMS-HCC) 02/16/2017   • Hypovitaminosis D 11/29/2016   • HTN (hypertension) 11/01/2016   • Chronic pain syndrome 10/27/2016   • Bowel and bladder incontinence 10/27/2016   • Galactorrhea 07/22/2016   • Elevated sedimentation rate 06/27/2016   • Weakness of both lower extremities 06/22/2016   • Vitamin D deficiency 05/21/2016   • Morbidly obese (CMS-HCC) 03/07/2016   • Scoliosis 03/07/2016   • GERD (gastroesophageal reflux disease) 03/07/2016   • Peripheral neuropathy (CMS-HCC) 03/06/2016   • H/O prior ablation treatment 02/10/2016       Current Outpatient Prescriptions   Medication Sig Dispense Refill   • fluoxetine (PROZAC) 10 MG Cap TAKE ONE CAPSULE BY MOUTH ONCE A DAY 30 Cap 11   • ziprasidone (GEODON) 80 MG Cap TAKE 2 CAPSULES BY MOUTH NIGHTLY AT BEDTIME 60 Cap 11   • promethazine (PHENERGAN) 25 MG Tab Take 1 Tab by mouth every 6 hours as needed for Nausea/Vomiting. 15 Tab 0   • furosemide (LASIX) 40 MG Tab Take 40 mg by mouth 2 Times a Day.     • fesoterodine fumarate (TOVIAZ) 4 MG TABLET SR 24 HR Take 1 Tab by mouth every day. 90 Tab 3   • Diclofenac Sodium 1 % Gel Apply 1 Application to affected area(s) 1 time daily as needed (pain). alternates between this and .3     • albuterol 108 (90 Base) MCG/ACT Aero Soln inhalation aerosol Inhale 2 Puffs by mouth every 6 hours as needed for Shortness of Breath. 8.5 g 1   • ondansetron (ZOFRAN ODT) 4 MG TABLET DISPERSIBLE Take 1 Tab by mouth every 6 hours as needed for Nausea. 30 Tab 6   • gabapentin (NEURONTIN) 600 MG tablet Take 1 Tab by mouth 3 times a day. 270 Tab 3   • trazodone (DESYREL) 50 MG Tab Take 1 Tab by mouth every bedtime. 30 Tab 11   • omeprazole (PRILOSEC) 20 MG delayed-release capsule Take 1 Cap by mouth every day. 30  Cap 5   • ivabradine (CORLANOR) 5 MG Tab tablet Take 1 Tab by mouth 2 times a day, with meals. 60 Tab 2   • sumatriptan (IMITREX) 20 MG/ACT nasal spray Spray 1 Spray in nose as needed for Migraine. 10 Each 11   • fluticasone (FLONASE) 50 MCG/ACT nasal spray Spray 2 Sprays in nose every day. 16 g 11   • loratadine (CLARITIN) 10 MG Tab Take 1 Tab by mouth every day. 30 Tab    • sitagliptin (JANUVIA) 100 MG Tab Take 1 Tab by mouth every day. (Patient taking differently: Take 50 mg by mouth every day.) 30 Tab 6   • levothyroxine (SYNTHROID) 75 MCG Tab Take 75 mcg by mouth Every morning on an empty stomach.     • lactulose 10 GM/15ML Solution Take 10 g by mouth 2 times a day as needed (For constipation).     • aspirin EC (ECOTRIN) 81 MG Tablet Delayed Response Take 1 Tab by mouth every day. 30 Tab 6   • baclofen (LIORESAL) 10 MG Tab Take 1 Tab by mouth 3 times a day. 90 Tab 6   • Melatonin 5 MG Tab Take 10 mg by mouth every bedtime.       No current facility-administered medications for this visit.          Allergies as of 01/24/2018 - Reviewed 01/24/2018   Allergen Reaction Noted   • Cefdinir Shortness of Breath and Itching 03/01/2016   • Depakote [divalproex sodium] Unspecified 06/14/2010   • Abilify Unspecified 01/17/2013   • Amitriptyline Unspecified 10/31/2013   • Amoxicillin Rash 09/18/2010   • Ciprofloxacin Rash 12/17/2009   • Clindamycin Nausea 02/02/2011   • Doxycycline Vomiting and Nausea 08/15/2012   • Ees [erythromycin] Vomiting and Nausea 08/28/2010   • Flagyl [metronidazole hcl] Unspecified 03/31/2011   • Flomax [tamsulosin hydrochloride] Swelling 09/24/2009   • Metformin Unspecified 07/23/2013   • Sulfa drugs Hives and Rash 09/18/2010   • Tape Rash 08/15/2012   • Vancomycin Itching 07/10/2016   • Wound dressing adhesive Hives 01/12/2018   • Cephalexin [keflex] Rash 01/01/2017   • Erythromycin Rash 03/30/2017   • Levofloxacin Unspecified 10/27/2016   • Metronidazole Rash 03/30/2017   • Valproic acid Rash  "03/30/2017        ROS: As per HPI.    /66   Pulse 79   Temp 36.9 °C (98.5 °F)   Resp 16   Ht 1.651 m (5' 5\")   Wt (!) 127 kg (280 lb)   SpO2 92%   BMI 46.59 kg/m²     Physical Exam:  Gen:         Alert and oriented, No apparent distress.  Neck:        No Lymphadenopathy or Bruits.  Lungs:     Clear to auscultation bilaterally  CV:          Regular rate and rhythm. No murmurs, rubs or gallops.               Ext:          No clubbing, cyanosis, edema.      Assessment and Plan.   27 y.o. female with the following issues.    1. Constipation, unspecified constipation type  Have recommended better hydration she will hold iron for the next 2 weeks to see if this improves her bowels. If not she will follow up.  - REFERRAL TO GASTROENTEROLOGY    2. Esophageal dysphagia  Have referred back to gastroenterology for possible EGD.  - REFERRAL TO GASTROENTEROLOGY      "

## 2018-01-26 ENCOUNTER — HOME CARE VISIT (OUTPATIENT)
Dept: HOME HEALTH SERVICES | Facility: HOME HEALTHCARE | Age: 28
End: 2018-01-26
Payer: MEDICARE

## 2018-01-26 VITALS
TEMPERATURE: 98.4 F | HEART RATE: 70 BPM | DIASTOLIC BLOOD PRESSURE: 96 MMHG | RESPIRATION RATE: 20 BRPM | OXYGEN SATURATION: 98 % | SYSTOLIC BLOOD PRESSURE: 137 MMHG

## 2018-01-26 PROCEDURE — G0156 HHCP-SVS OF AIDE,EA 15 MIN: HCPCS

## 2018-01-26 PROCEDURE — 665999 HH PPS REVENUE DEBIT

## 2018-01-26 PROCEDURE — 665998 HH PPS REVENUE CREDIT

## 2018-01-27 PROCEDURE — 665998 HH PPS REVENUE CREDIT

## 2018-01-27 PROCEDURE — 665999 HH PPS REVENUE DEBIT

## 2018-01-28 PROCEDURE — 665999 HH PPS REVENUE DEBIT

## 2018-01-28 PROCEDURE — 665998 HH PPS REVENUE CREDIT

## 2018-01-28 SDOH — ECONOMIC STABILITY: HOUSING INSECURITY: UNSAFE COOKING RANGE AREA: 0

## 2018-01-28 SDOH — ECONOMIC STABILITY: HOUSING INSECURITY: UNSAFE APPLIANCES: 0

## 2018-01-29 ENCOUNTER — HOME CARE VISIT (OUTPATIENT)
Dept: HOME HEALTH SERVICES | Facility: HOME HEALTHCARE | Age: 28
End: 2018-01-29
Payer: MEDICARE

## 2018-01-29 VITALS
SYSTOLIC BLOOD PRESSURE: 126 MMHG | OXYGEN SATURATION: 97 % | RESPIRATION RATE: 16 BRPM | HEART RATE: 71 BPM | TEMPERATURE: 98 F | DIASTOLIC BLOOD PRESSURE: 58 MMHG

## 2018-01-29 PROCEDURE — 665998 HH PPS REVENUE CREDIT

## 2018-01-29 PROCEDURE — 665999 HH PPS REVENUE DEBIT

## 2018-01-29 PROCEDURE — G0299 HHS/HOSPICE OF RN EA 15 MIN: HCPCS

## 2018-01-30 ENCOUNTER — TELEPHONE (OUTPATIENT)
Dept: MEDICAL GROUP | Facility: MEDICAL CENTER | Age: 28
End: 2018-01-30

## 2018-01-30 ENCOUNTER — HOME CARE VISIT (OUTPATIENT)
Dept: HOME HEALTH SERVICES | Facility: HOME HEALTHCARE | Age: 28
End: 2018-01-30
Payer: MEDICARE

## 2018-01-30 VITALS
SYSTOLIC BLOOD PRESSURE: 140 MMHG | TEMPERATURE: 99.1 F | OXYGEN SATURATION: 98 % | RESPIRATION RATE: 20 BRPM | DIASTOLIC BLOOD PRESSURE: 90 MMHG | HEART RATE: 78 BPM

## 2018-01-30 DIAGNOSIS — E66.01 MORBIDLY OBESE (HCC): ICD-10-CM

## 2018-01-30 PROCEDURE — 665998 HH PPS REVENUE CREDIT

## 2018-01-30 PROCEDURE — G0156 HHCP-SVS OF AIDE,EA 15 MIN: HCPCS

## 2018-01-30 PROCEDURE — 665999 HH PPS REVENUE DEBIT

## 2018-01-30 NOTE — TELEPHONE ENCOUNTER
1. Caller Name: HARLEY                                         Call Back Number: [phone] (home)         Patient approves a detailed voicemail message: yes    nitin called wanting to know if you can order a new walker due to her walket not helping her due to her increase in weight.walker order needs to say walker with seat attachment and brakes patient weight is 280lbs nitin will  order

## 2018-01-31 ENCOUNTER — HOSPITAL ENCOUNTER (OUTPATIENT)
Dept: LAB | Facility: MEDICAL CENTER | Age: 28
End: 2018-01-31
Attending: INTERNAL MEDICINE
Payer: MEDICARE

## 2018-01-31 ENCOUNTER — HOME CARE VISIT (OUTPATIENT)
Dept: HOME HEALTH SERVICES | Facility: HOME HEALTHCARE | Age: 28
End: 2018-01-31
Payer: MEDICARE

## 2018-01-31 VITALS
TEMPERATURE: 97 F | HEART RATE: 75 BPM | OXYGEN SATURATION: 98 % | DIASTOLIC BLOOD PRESSURE: 64 MMHG | RESPIRATION RATE: 16 BRPM | SYSTOLIC BLOOD PRESSURE: 124 MMHG

## 2018-01-31 LAB
ALBUMIN SERPL BCP-MCNC: 4.5 G/DL (ref 3.2–4.9)
ALBUMIN/GLOB SERPL: 2 G/DL
ALP SERPL-CCNC: 61 U/L (ref 30–99)
ALT SERPL-CCNC: 37 U/L (ref 2–50)
ANION GAP SERPL CALC-SCNC: 12 MMOL/L (ref 0–11.9)
AST SERPL-CCNC: 26 U/L (ref 12–45)
BILIRUB SERPL-MCNC: 0.5 MG/DL (ref 0.1–1.5)
BUN SERPL-MCNC: 11 MG/DL (ref 8–22)
CALCIUM SERPL-MCNC: 9.1 MG/DL (ref 8.5–10.5)
CHLORIDE SERPL-SCNC: 107 MMOL/L (ref 96–112)
CHOLEST SERPL-MCNC: 214 MG/DL (ref 100–199)
CO2 SERPL-SCNC: 19 MMOL/L (ref 20–33)
CREAT SERPL-MCNC: 0.67 MG/DL (ref 0.5–1.4)
EST. AVERAGE GLUCOSE BLD GHB EST-MCNC: 114 MG/DL
GLOBULIN SER CALC-MCNC: 2.3 G/DL (ref 1.9–3.5)
GLUCOSE SERPL-MCNC: 102 MG/DL (ref 65–99)
HBA1C MFR BLD: 5.6 % (ref 0–5.6)
HDLC SERPL-MCNC: 61 MG/DL
LDLC SERPL CALC-MCNC: 120 MG/DL
POTASSIUM SERPL-SCNC: 3.9 MMOL/L (ref 3.6–5.5)
PROT SERPL-MCNC: 6.8 G/DL (ref 6–8.2)
SODIUM SERPL-SCNC: 138 MMOL/L (ref 135–145)
TRIGL SERPL-MCNC: 164 MG/DL (ref 0–149)
TSH SERPL DL<=0.005 MIU/L-ACNC: 5.23 UIU/ML (ref 0.38–5.33)

## 2018-01-31 PROCEDURE — 80053 COMPREHEN METABOLIC PANEL: CPT

## 2018-01-31 PROCEDURE — 665998 HH PPS REVENUE CREDIT

## 2018-01-31 PROCEDURE — 84443 ASSAY THYROID STIM HORMONE: CPT

## 2018-01-31 PROCEDURE — 83036 HEMOGLOBIN GLYCOSYLATED A1C: CPT | Mod: GA

## 2018-01-31 PROCEDURE — 36415 COLL VENOUS BLD VENIPUNCTURE: CPT

## 2018-01-31 PROCEDURE — 80061 LIPID PANEL: CPT

## 2018-01-31 PROCEDURE — G0162 HHC RN E&M PLAN SVS, 15 MIN: HCPCS

## 2018-01-31 PROCEDURE — 665999 HH PPS REVENUE DEBIT

## 2018-01-31 SDOH — ECONOMIC STABILITY: HOUSING INSECURITY: UNSAFE APPLIANCES: 0

## 2018-01-31 SDOH — ECONOMIC STABILITY: HOUSING INSECURITY
HOME SAFETY: AND FUNCTIONAL ON EACH LEVEL OF THE HOME. PATIENT DOES HAVE A FIRE ESCAPE PLAN DEVELOPED. PATIENT DOES NOT HAVE FLAMMABLE MATERIALS PRESENT IN THE HOME PRESENTING A FIRE HAZARD. NO EVIDENCE FOUND OF SMOKING MATERIALS PRESENT IN THE HOME.

## 2018-01-31 SDOH — ECONOMIC STABILITY: HOUSING INSECURITY
HOME SAFETY: PATIENT LIVES IN A MULTI-LEVEL HOME WITH HER GRANDPARENTS.   OXYGEN SAFETY RISK ASSESSMENT PERFORMED. PATIENT DOES HAVE A NO SMOKING SIGN POSTED IN THE HOME. PATIENT DOES HAVE A WORKING FIRE EXTINGUISHER PRESENT IN THE HOME. SMOKE ALARMS ARE PRESENT

## 2018-01-31 SDOH — ECONOMIC STABILITY: HOUSING INSECURITY: UNSAFE COOKING RANGE AREA: 0

## 2018-01-31 ASSESSMENT — ENCOUNTER SYMPTOMS
DIFFICULTY THINKING: 1
DEPRESSED MOOD: 1

## 2018-01-31 ASSESSMENT — ACTIVITIES OF DAILY LIVING (ADL)
OASIS_M1830: 02
HOME_HEALTH_OASIS: 01

## 2018-02-01 PROCEDURE — 665999 HH PPS REVENUE DEBIT

## 2018-02-01 PROCEDURE — 665998 HH PPS REVENUE CREDIT

## 2018-02-02 PROCEDURE — 665998 HH PPS REVENUE CREDIT

## 2018-02-02 PROCEDURE — 665999 HH PPS REVENUE DEBIT

## 2018-02-03 PROCEDURE — 665999 HH PPS REVENUE DEBIT

## 2018-02-03 PROCEDURE — 665998 HH PPS REVENUE CREDIT

## 2018-02-04 PROCEDURE — 665998 HH PPS REVENUE CREDIT

## 2018-02-04 PROCEDURE — 665999 HH PPS REVENUE DEBIT

## 2018-02-05 DIAGNOSIS — R07.89 OTHER CHEST PAIN: ICD-10-CM

## 2018-02-05 PROCEDURE — 665999 HH PPS REVENUE DEBIT

## 2018-02-05 PROCEDURE — 665998 HH PPS REVENUE CREDIT

## 2018-02-06 PROCEDURE — 665998 HH PPS REVENUE CREDIT

## 2018-02-06 PROCEDURE — 665999 HH PPS REVENUE DEBIT

## 2018-02-06 RX ORDER — IVABRADINE 5 MG/1
TABLET, FILM COATED ORAL
Qty: 60 TAB | Refills: 0 | Status: SHIPPED | OUTPATIENT
Start: 2018-02-06 | End: 2018-02-27 | Stop reason: SDUPTHER

## 2018-02-07 PROCEDURE — 665998 HH PPS REVENUE CREDIT

## 2018-02-07 PROCEDURE — 665999 HH PPS REVENUE DEBIT

## 2018-02-08 PROCEDURE — 665998 HH PPS REVENUE CREDIT

## 2018-02-08 PROCEDURE — 665999 HH PPS REVENUE DEBIT

## 2018-02-08 RX ORDER — BACLOFEN 10 MG/1
10 TABLET ORAL 3 TIMES DAILY
Qty: 90 TAB | Refills: 5 | Status: SHIPPED | OUTPATIENT
Start: 2018-02-08 | End: 2018-07-14

## 2018-02-23 ENCOUNTER — OFFICE VISIT (OUTPATIENT)
Dept: CARDIOLOGY | Facility: MEDICAL CENTER | Age: 28
End: 2018-02-23
Payer: MEDICARE

## 2018-02-23 VITALS
OXYGEN SATURATION: 96 % | DIASTOLIC BLOOD PRESSURE: 74 MMHG | SYSTOLIC BLOOD PRESSURE: 122 MMHG | WEIGHT: 270 LBS | BODY MASS INDEX: 44.98 KG/M2 | HEIGHT: 65 IN | HEART RATE: 93 BPM

## 2018-02-23 DIAGNOSIS — R00.0 TACHYCARDIA: ICD-10-CM

## 2018-02-23 LAB — EKG IMPRESSION: NORMAL

## 2018-02-23 PROCEDURE — 93000 ELECTROCARDIOGRAM COMPLETE: CPT | Performed by: INTERNAL MEDICINE

## 2018-02-23 PROCEDURE — 99213 OFFICE O/P EST LOW 20 MIN: CPT | Performed by: INTERNAL MEDICINE

## 2018-02-23 ASSESSMENT — ENCOUNTER SYMPTOMS
PALPITATIONS: 0
WEAKNESS: 1

## 2018-02-23 NOTE — PROGRESS NOTES
"Subjective:   Kristin Balderrama is a 27 y.o. female who presents today for follow up of tachycardia and now diagnosed with unspecified mitochondrial disease.    corlanor generally works very well.  Ran out and was tachy and may have ended up in a fib.  Generally doing well.        Trying to fight weight.  Doing generally well        Past Medical History:   Diagnosis Date   • Anginal syndrome     random chest pain especially with tachycardia   • Arrhythmia     \"sinus tachycardia\", cariologist, Dr. Kumar; ablation 2/2016   • Arthritis     osteo   • ASTHMA     when around smoke   • Borderline personality disorder    • Breath shortness     with tachycardia   • Chronic UTI 9/18/2010   • Disorder of thyroid    • Fall    • Gynecological disorder     PCOS   • Headache(784.0)    • Heart burn    • History of falling    • Hypertension    • Migraine    • 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CMS-HCC)    • Multiple personality disorder    • Obesity    • Pain 08-15-12    back, 7/10   • PCOS (polycystic ovarian syndrome)    • Pneumonia 2012   • Psychosis    • Renal disorder     \"kidney disease, stage 1\" nephrologist, Dr. 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   • Urinary bladder disorder     Suprapubic cath   • Urinary incontinence      Past Surgical History:   Procedure Laterality Date   • MUSCLE BIOPSY Right 1/26/2017    Procedure: MUSCLE BIOPSY - THIGH;  Surgeon: Isidro Vigil M.D.;  Location: [facility];  Service:    • GASTROSCOPY WITH BALLOON DILATATION N/A 1/4/2017    Procedure: GASTROSCOPY WITH DILATATION;  Surgeon: Torres Vargas M.D.;  Location: [facility];  Service:    • BOWEL STIMULATOR INSERTION  7/13/2016    Procedure: BOWEL STIMULATOR INSERTION FOR PERMANENT INTERSTIM SACRAL IMPLANT;  Surgeon: Joe Noyola M.D.;  Location: SURGERY [facility];  " "Service:    • RECOVERY  1/27/2016    Procedure: CATH LAB EP STUDY WITH SINUS NODE MODIFICATION ABHINAV;  Surgeon: Lina Surgery;  Location: SURGERY PRE-POST PROC UNIT [facility];  Service:    • OTHER CARDIAC SURGERY  1/2016    cardiac ablation   • NEURO DEST FACET L/S W/IG SNGL  4/21/2015    Performed by Reza Tabor at SURGERY [facility]   • LUMBAR FUSION ANTERIOR  8/21/2012    Performed by SUSIE SAWANT at SURGERY [facility]S   • ALYSSA BY LAPAROSCOPY  8/29/2010    Performed by SHAYY JOHNS at SURGERY [facility]S   • OTHER ABDOMINAL SURGERY     • TONSILLECTOMY      tonsillectomy     Family History   Problem Relation Age of Onset   • Hypertension Mother    • Sleep Apnea Mother    • Heart Disease Mother    • Other Mother      hypothryod   • Hypertension Maternal Uncle    • Heart Disease Maternal Grandmother    • Hypertension Maternal Grandmother    • Other Sister      Narcolepsy;fibromyalsia;bone;nerve   • Genitourinary () Sister      endometriosis     History   Smoking Status   • Never Smoker   Smokeless Tobacco   • Never Used     Allergies   Allergen Reactions   • Cefdinir Shortness of Breath and Itching     Tolerated 1/18/17   • Depakote [Divalproex Sodium] Unspecified     Muscle spasms/muscle pain and weakness     • Abilify Unspecified     Headaches/muscle twitching     • Amitriptyline Unspecified     Headaches     • Amoxicillin Rash     Pt states \"I get a rash\".     • Ciprofloxacin Rash     Pt states \"I get a rash\".     • Clindamycin Nausea     Even with food     • Doxycycline Vomiting and Nausea     RXN=unknown   • Ees [Erythromycin] Vomiting and Nausea   • Flagyl [Metronidazole Hcl] Unspecified     \"eye problems\"     • Flomax [Tamsulosin Hydrochloride] Swelling   • Metformin Unspecified     Increased lactic acid      • Sulfa Drugs Hives and Rash     RXN=since childhood   • Tape Rash     Tears skin off   coban with Tegaderm tape ok  RXN=ongoing   • Vancomycin Itching     Pt becomes flushed " in face and chest.   RXN=7/10/16   • Wound Dressing Adhesive Hives     By pt report   • Cephalexin [Keflex] Rash     Pt states she gets a rash with this medication   • Erythromycin Rash     .   • Levofloxacin Unspecified     Leg muscle cramps   • Metronidazole Rash     .   • Valproic Acid Rash     .     Outpatient Encounter Prescriptions as of 2/23/2018   Medication Sig Dispense Refill   • baclofen (LIORESAL) 10 MG Tab Take 1 Tab by mouth 3 times a day. 90 Tab 5   • CORLANOR 5 MG Tab tablet TAKE ONE TABLET BY MOUTH TWICE DAILY WITH MEALS 60 Tab 0   • NON SPECIFIED Bariatric Front wheel walker with seat and breaks. 1 Each 0   • sodium bicarbonate (SODIUM BICARBONATE) 650 MG Tab Take 650 mg by mouth 3 times a day.     • non-formulary med Take 15 mL by mouth every 4 hours. CBD Oil/Sugar - every 4 hours for chornic pain     • fluoxetine (PROZAC) 10 MG Cap TAKE ONE CAPSULE BY MOUTH ONCE A DAY 30 Cap 11   • ziprasidone (GEODON) 80 MG Cap TAKE 2 CAPSULES BY MOUTH NIGHTLY AT BEDTIME 60 Cap 11   • fesoterodine fumarate (TOVIAZ) 4 MG TABLET SR 24 HR Take 1 Tab by mouth every day. 90 Tab 3   • albuterol 108 (90 Base) MCG/ACT Aero Soln inhalation aerosol Inhale 2 Puffs by mouth every 6 hours as needed for Shortness of Breath. 8.5 g 1   • gabapentin (NEURONTIN) 600 MG tablet Take 1 Tab by mouth 3 times a day. 270 Tab 3   • trazodone (DESYREL) 50 MG Tab Take 1 Tab by mouth every bedtime. 30 Tab 11   • sitagliptin (JANUVIA) 100 MG Tab Take 1 Tab by mouth every day. (Patient taking differently: Take 50 mg by mouth every day.) 30 Tab 6   • lactulose 10 GM/15ML Solution Take 10 g by mouth 2 times a day as needed (For constipation).     • aspirin EC (ECOTRIN) 81 MG Tablet Delayed Response Take 1 Tab by mouth every day. 30 Tab 6   • Melatonin 5 MG Tab Take 10 mg by mouth every bedtime.     • promethazine (PHENERGAN) 25 MG Tab Take 1 Tab by mouth every 6 hours as needed for Nausea/Vomiting. (Patient not taking: Reported on 1/29/2018) 15  "Tab 0   • ondansetron (ZOFRAN ODT) 4 MG TABLET DISPERSIBLE Take 1 Tab by mouth every 6 hours as needed for Nausea. (Patient not taking: Reported on 1/29/2018) 30 Tab 6   • omeprazole (PRILOSEC) 20 MG delayed-release capsule Take 1 Cap by mouth every day. (Patient not taking: Reported on 1/29/2018) 30 Cap 5   • sumatriptan (IMITREX) 20 MG/ACT nasal spray Spray 1 Spray in nose as needed for Migraine. (Patient not taking: Reported on 1/29/2018) 10 Each 11   • fluticasone (FLONASE) 50 MCG/ACT nasal spray Spray 2 Sprays in nose every day. (Patient not taking: Reported on 1/29/2018) 16 g 11     No facility-administered encounter medications on file as of 2/23/2018.      Review of Systems   Cardiovascular: Negative for palpitations.   Neurological: Positive for weakness.        Objective:   /74   Pulse 93   Ht 1.651 m (5' 5\")   Wt 122.5 kg (270 lb)   SpO2 96%   BMI 44.93 kg/m²     Physical Exam   Constitutional: She is oriented to person, place, and time. She appears well-developed and well-nourished. No distress.   HENT:   Head: Normocephalic and atraumatic.   Right Ear: External ear normal.   Left Ear: External ear normal.   Mouth/Throat: Oropharynx is clear and moist.   Eyes: Conjunctivae and EOM are normal. Right eye exhibits no discharge. Left eye exhibits no discharge. No scleral icterus.   Neck: Normal range of motion. Neck supple. No JVD present. No tracheal deviation present. No thyromegaly present.   Cardiovascular: Normal rate, regular rhythm, normal heart sounds and intact distal pulses.  Exam reveals no gallop and no friction rub.    No murmur heard.  Pulmonary/Chest: Effort normal and breath sounds normal. No stridor. No respiratory distress. She has no wheezes. She has no rales.   Abdominal: Soft. She exhibits no distension.   Musculoskeletal: She exhibits no edema or tenderness.   Neurological: She is alert and oriented to person, place, and time. No cranial nerve deficit. Coordination normal. "   Skin: Skin is warm and dry. No rash noted. She is not diaphoretic. No erythema. No pallor.   Psychiatric: She has a normal mood and affect. Her behavior is normal. Judgment and thought content normal.   Vitals reviewed.      Assessment:     1. Tachycardia  EKG       Medical Decision Making:  Today's Assessment / Status / Plan:   corlanor continue as it does help her sympomatically signifcantly  encuoraged strict diet to try to get some weight loss.

## 2018-02-27 DIAGNOSIS — R07.89 OTHER CHEST PAIN: ICD-10-CM

## 2018-03-08 ENCOUNTER — APPOINTMENT (OUTPATIENT)
Dept: MEDICAL GROUP | Facility: MEDICAL CENTER | Age: 28
End: 2018-03-08
Payer: MEDICARE

## 2018-03-12 ENCOUNTER — OFFICE VISIT (OUTPATIENT)
Dept: URGENT CARE | Facility: CLINIC | Age: 28
End: 2018-03-12
Payer: MEDICARE

## 2018-03-12 VITALS
BODY MASS INDEX: 44.98 KG/M2 | DIASTOLIC BLOOD PRESSURE: 70 MMHG | WEIGHT: 270 LBS | HEART RATE: 74 BPM | TEMPERATURE: 96.9 F | HEIGHT: 65 IN | RESPIRATION RATE: 16 BRPM | SYSTOLIC BLOOD PRESSURE: 124 MMHG | OXYGEN SATURATION: 98 %

## 2018-03-12 DIAGNOSIS — R21 RASH: ICD-10-CM

## 2018-03-12 PROCEDURE — 99214 OFFICE O/P EST MOD 30 MIN: CPT | Performed by: FAMILY MEDICINE

## 2018-03-12 ASSESSMENT — PAIN SCALES - GENERAL: PAINLEVEL: 6=MODERATE PAIN

## 2018-03-12 ASSESSMENT — ENCOUNTER SYMPTOMS
FOCAL WEAKNESS: 0
FEVER: 0
CHILLS: 0
DIZZINESS: 0
HEMOPTYSIS: 0
SHORTNESS OF BREATH: 0
ORTHOPNEA: 0

## 2018-03-12 NOTE — PROGRESS NOTES
"Subjective:      Kristin Balderrama is a 27 y.o. female who presents with Other (sore under breast x1 month)    Chief Complaint   Patient presents with   • Other     sore under breast x1 month        - This is a very pleasant 27 y.o. female with complaints of itchy red area under breasts x 4 wks. Also repeated picking at spots on breasts x 4 wks and they wont heal           ALLERGIES:  Cefdinir; Depakote [divalproex sodium]; Abilify; Amitriptyline; Amoxicillin; Ciprofloxacin; Clindamycin; Doxycycline; Ees [erythromycin]; Flagyl [metronidazole hcl]; Flomax [tamsulosin hydrochloride]; Metformin; Sulfa drugs; Tape; Vancomycin; Wound dressing adhesive; Cephalexin [keflex]; Erythromycin; Levofloxacin; Metronidazole; and Valproic acid     PMH:  Past Medical History:   Diagnosis Date   • Anginal syndrome     random chest pain especially with tachycardia   • Arrhythmia     \"sinus tachycardia\", cariologist, Dr. Kumar; ablation 2/2016   • Arthritis     osteo   • ASTHMA     when around smoke   • Borderline personality disorder    • Breath shortness     with tachycardia   • Chronic UTI 9/18/2010   • Disorder of thyroid    • Fall    • Gynecological disorder     PCOS   • Headache(784.0)    • Heart burn    • History of falling    • Hypertension    • Migraine    • 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CMS-HCC)    • Multiple personality disorder    • Obesity    • Pain 08-15-12    back, 7/10   • PCOS (polycystic ovarian syndrome)    • Pneumonia 2012   • Psychosis    • Renal disorder     \"kidney disease, stage 1\" nephrologist, Dr. 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   • Urinary bladder disorder     Suprapubic cath   • Urinary incontinence         MEDS:    Current Outpatient Prescriptions:   •  mupirocin (BACTROBAN) 2 % Ointment, Apply BID to infected sores x 1week, Disp: 30 g, Rfl: 0  •  " nystatin/triamcinolone (MYCOLOG) 968908-6.1 UNIT/GM-% Cream, Apply TID to itchy area under breasts x 1week, Disp: 45 g, Rfl: 0  •  ivabradine (CORLANOR) 5 MG Tab tablet, Take 1 Tab by mouth 2 times a day, with meals., Disp: 60 Tab, Rfl: 11  •  baclofen (LIORESAL) 10 MG Tab, Take 1 Tab by mouth 3 times a day., Disp: 90 Tab, Rfl: 5  •  NON SPECIFIED, Bariatric Front wheel walker with seat and breaks., Disp: 1 Each, Rfl: 0  •  sodium bicarbonate (SODIUM BICARBONATE) 650 MG Tab, Take 650 mg by mouth 3 times a day., Disp: , Rfl:   •  non-formulary med, Take 15 mL by mouth every 4 hours. CBD Oil/Sugar - every 4 hours for chornic pain, Disp: , Rfl:   •  fluoxetine (PROZAC) 10 MG Cap, TAKE ONE CAPSULE BY MOUTH ONCE A DAY, Disp: 30 Cap, Rfl: 11  •  ziprasidone (GEODON) 80 MG Cap, TAKE 2 CAPSULES BY MOUTH NIGHTLY AT BEDTIME, Disp: 60 Cap, Rfl: 11  •  promethazine (PHENERGAN) 25 MG Tab, Take 1 Tab by mouth every 6 hours as needed for Nausea/Vomiting. (Patient not taking: Reported on 1/29/2018), Disp: 15 Tab, Rfl: 0  •  fesoterodine fumarate (TOVIAZ) 4 MG TABLET SR 24 HR, Take 1 Tab by mouth every day., Disp: 90 Tab, Rfl: 3  •  albuterol 108 (90 Base) MCG/ACT Aero Soln inhalation aerosol, Inhale 2 Puffs by mouth every 6 hours as needed for Shortness of Breath., Disp: 8.5 g, Rfl: 1  •  ondansetron (ZOFRAN ODT) 4 MG TABLET DISPERSIBLE, Take 1 Tab by mouth every 6 hours as needed for Nausea. (Patient not taking: Reported on 1/29/2018), Disp: 30 Tab, Rfl: 6  •  gabapentin (NEURONTIN) 600 MG tablet, Take 1 Tab by mouth 3 times a day., Disp: 270 Tab, Rfl: 3  •  trazodone (DESYREL) 50 MG Tab, Take 1 Tab by mouth every bedtime., Disp: 30 Tab, Rfl: 11  •  omeprazole (PRILOSEC) 20 MG delayed-release capsule, Take 1 Cap by mouth every day. (Patient not taking: Reported on 1/29/2018), Disp: 30 Cap, Rfl: 5  •  sumatriptan (IMITREX) 20 MG/ACT nasal spray, Spray 1 Spray in nose as needed for Migraine. (Patient not taking: Reported on  "1/29/2018), Disp: 10 Each, Rfl: 11  •  fluticasone (FLONASE) 50 MCG/ACT nasal spray, Spray 2 Sprays in nose every day. (Patient not taking: Reported on 1/29/2018), Disp: 16 g, Rfl: 11  •  sitagliptin (JANUVIA) 100 MG Tab, Take 1 Tab by mouth every day. (Patient taking differently: Take 50 mg by mouth every day.), Disp: 30 Tab, Rfl: 6  •  lactulose 10 GM/15ML Solution, Take 10 g by mouth 2 times a day as needed (For constipation)., Disp: , Rfl:   •  aspirin EC (ECOTRIN) 81 MG Tablet Delayed Response, Take 1 Tab by mouth every day., Disp: 30 Tab, Rfl: 6  •  Melatonin 5 MG Tab, Take 10 mg by mouth every bedtime., Disp: , Rfl:     ** I have documented what I find to be significant in regards to past medical, social, family and surgical history  in my HPI or under PMH/PSH/FH review section, otherwise it is contributory **           HPI    Review of Systems   Constitutional: Negative for chills and fever.   Respiratory: Negative for hemoptysis and shortness of breath.    Cardiovascular: Negative for chest pain and orthopnea.   Neurological: Negative for dizziness and focal weakness.          Objective:     /70   Pulse 74   Temp 36.1 °C (96.9 °F)   Resp 16   Ht 1.651 m (5' 5\")   Wt 122.5 kg (270 lb)   SpO2 98%   Breastfeeding? No   BMI 44.93 kg/m²      Physical Exam   Constitutional: She appears well-developed. No distress.   HENT:   Head: Normocephalic and atraumatic.   Mouth/Throat: Oropharynx is clear and moist.   Eyes: Conjunctivae are normal.   Neck: Neck supple.   Cardiovascular: Regular rhythm.    No murmur heard.  Pulmonary/Chest: Effort normal. No respiratory distress.   Neurological: She is alert. She exhibits normal muscle tone.   Skin: Skin is warm and dry.   Psychiatric: She has a normal mood and affect. Judgment normal.   Nursing note and vitals reviewed.  ~6-8 ~1cm neurotic excoriated type lesions on mid to upper breasts, some scabbed over. Under breasts some mild erythema             "   Assessment/Plan:         1. Neurotic excoriations  mupirocin (BACTROBAN) 2 % Ointment    nystatin/triamcinolone (MYCOLOG) 896568-7.1 UNIT/GM-% Cream             Dx & d/c instructions discussed w/ patient and/or family members. Follow up w/ Prvt Dr or here in 3-4 days if not getting better, sooner if needed,  ER if worse and UC/PCP unavailable.        Possible side effects (i.e. Rash, GI upset/constipation, sedation, elevation of BP or sugars) of any medications given discussed.

## 2018-03-13 ENCOUNTER — OFFICE VISIT (OUTPATIENT)
Dept: BEHAVIORAL HEALTH | Facility: PHYSICIAN GROUP | Age: 28
End: 2018-03-13
Payer: MEDICARE

## 2018-03-13 VITALS
WEIGHT: 270 LBS | HEIGHT: 65 IN | SYSTOLIC BLOOD PRESSURE: 122 MMHG | BODY MASS INDEX: 44.98 KG/M2 | TEMPERATURE: 97.9 F | RESPIRATION RATE: 16 BRPM | HEART RATE: 78 BPM | DIASTOLIC BLOOD PRESSURE: 70 MMHG

## 2018-03-13 DIAGNOSIS — F25.1 SCHIZOAFFECTIVE DISORDER, DEPRESSIVE TYPE (HCC): ICD-10-CM

## 2018-03-13 DIAGNOSIS — F60.3 BORDERLINE PERSONALITY DISORDER (HCC): ICD-10-CM

## 2018-03-13 PROCEDURE — 90833 PSYTX W PT W E/M 30 MIN: CPT | Performed by: PSYCHIATRY & NEUROLOGY

## 2018-03-13 PROCEDURE — 99213 OFFICE O/P EST LOW 20 MIN: CPT | Performed by: PSYCHIATRY & NEUROLOGY

## 2018-03-13 RX ORDER — BUSPIRONE HYDROCHLORIDE 5 MG/1
5 TABLET ORAL 2 TIMES DAILY
Qty: 60 TAB | Refills: 5 | Status: ON HOLD | OUTPATIENT
Start: 2018-03-13 | End: 2018-08-08

## 2018-03-13 ASSESSMENT — PATIENT HEALTH QUESTIONNAIRE - PHQ9
2. FEELING DOWN, DEPRESSED, IRRITABLE, OR HOPELESS: 0
SUM OF ALL RESPONSES TO PHQ9 QUESTIONS 1 AND 2: 0
1. LITTLE INTEREST OR PLEASURE IN DOING THINGS: 0
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
## 2018-03-15 ENCOUNTER — OFFICE VISIT (OUTPATIENT)
Dept: MEDICAL GROUP | Facility: MEDICAL CENTER | Age: 28
End: 2018-03-15
Payer: MEDICARE

## 2018-03-15 VITALS
DIASTOLIC BLOOD PRESSURE: 70 MMHG | SYSTOLIC BLOOD PRESSURE: 110 MMHG | BODY MASS INDEX: 44.98 KG/M2 | TEMPERATURE: 97 F | OXYGEN SATURATION: 95 % | WEIGHT: 270 LBS | RESPIRATION RATE: 16 BRPM | HEART RATE: 80 BPM | HEIGHT: 65 IN

## 2018-03-15 DIAGNOSIS — F51.01 PRIMARY INSOMNIA: ICD-10-CM

## 2018-03-15 DIAGNOSIS — L50.9 HIVES: ICD-10-CM

## 2018-03-15 DIAGNOSIS — E11.9 TYPE 2 DIABETES MELLITUS WITHOUT COMPLICATION, WITHOUT LONG-TERM CURRENT USE OF INSULIN (HCC): ICD-10-CM

## 2018-03-15 PROCEDURE — 99214 OFFICE O/P EST MOD 30 MIN: CPT | Performed by: INTERNAL MEDICINE

## 2018-03-15 RX ORDER — TRAZODONE HYDROCHLORIDE 100 MG/1
100 TABLET ORAL
Qty: 90 TAB | Refills: 3 | Status: SHIPPED | OUTPATIENT
Start: 2018-03-15 | End: 2018-09-07

## 2018-03-15 NOTE — PROGRESS NOTES
CC: Follow-up diabetes and insomnia complaining of hives.    HPI:   Kristin presents today with the following.    1. Type 2 diabetes mellitus without complication, without long-term current use of insulin (CMS-HCC)  Presents reporting that she would prefer to do the injectables postoperative pills for diabetes. Also there is weight loss associated with the injectable that she has been on. She does tolerate it well.    2. Primary insomnia  Difficulty sleeping requesting up on trazodone. She is here with grandmother who reports she is sleeping frequently during the day making it difficult for her to sleep at night. Certainly vicious cycle and she is on multiple sedating medications.    3. Hives  She is reporting intermittent hives and itching for which she takes Benadryl again reports sedation. She has no rash today but reports it happens almost every other day she denies any new products in her home. She has more anxiety medications recently changed by psychiatry.      Patient Active Problem List    Diagnosis Date Noted   • Sinus tachycardia 10/31/2013     Priority: High   • Chronic inflammatory arthritis 05/23/2016     Priority: Medium   • Depression 10/28/2016     Priority: Low   • Schizophrenia (CMS-HCC) 10/27/2016     Priority: Low   • Psychosis, schizophrenia, simple ([facility]) 09/29/2016     Priority: Low     Class: Chronic   • Acquired hypothyroidism 11/23/2015     Priority: Low   • PCOS (polycystic ovarian syndrome) 11/23/2015     Priority: Low   • Progressive focal motor weakness 06/28/2015     Priority: Low   • Fatty liver disease, nonalcoholic 01/19/2015     Priority: Low   • Anxiety 12/16/2014     Priority: Low   • Knee pain, right 02/13/2014     Priority: Low   • Neurogenic bladder 04/02/2011     Priority: Low   • Chronic UTI 09/18/2010     Priority: Low   • Borderline personality disorder in adult 09/18/2010     Priority: Low   • TONYA (obstructive sleep apnea) 01/09/2018   • Functional diarrhea 01/05/2018   •  Breast wound 11/06/2017   • Morbid obesity with BMI of 45.0-49.9, adult ([facility]) 10/24/2017   • Intractable episodic cluster headache 09/14/2017   • Rash 06/09/2017   • Hashimoto's encephalopathy 05/17/2017   • Fall at home 05/13/2017   • Type 2 diabetes mellitus without complication, without long-term current use of insulin (CMS-HCC) 04/26/2017   • On home oxygen therapy 04/15/2017   • Chest pain 03/30/2017   • Weakness of right upper extremity 02/23/2017   • Chronic suprapubic catheter (CMS-HCC) 02/16/2017   • Hypovitaminosis D 11/29/2016   • HTN (hypertension) 11/01/2016   • Chronic pain syndrome 10/27/2016   • Bowel and bladder incontinence 10/27/2016   • Galactorrhea 07/22/2016   • Elevated sedimentation rate 06/27/2016   • Weakness of both lower extremities 06/22/2016   • Vitamin D deficiency 05/21/2016   • Morbidly obese (CMS-HCC) 03/07/2016   • Scoliosis 03/07/2016   • GERD (gastroesophageal reflux disease) 03/07/2016   • Peripheral neuropathy (CMS-HCC) 03/06/2016   • H/O prior ablation treatment 02/10/2016       Current Outpatient Prescriptions   Medication Sig Dispense Refill   • liraglutide (VICTOZA) 18 MG/3ML Solution Pen-injector injection Inject 0.3 mL as instructed every day. 3 PEN 6   • traZODone (DESYREL) 100 MG Tab Take 1 Tab by mouth every bedtime. 90 Tab 3   • busPIRone (BUSPAR) 5 MG Tab Take 1 Tab by mouth 2 times a day. 60 Tab 5   • mupirocin (BACTROBAN) 2 % Ointment Apply BID to infected sores x 1week 30 g 0   • nystatin/triamcinolone (MYCOLOG) 337682-2.1 UNIT/GM-% Cream Apply TID to itchy area under breasts x 1week 45 g 0   • ivabradine (CORLANOR) 5 MG Tab tablet Take 1 Tab by mouth 2 times a day, with meals. 60 Tab 11   • baclofen (LIORESAL) 10 MG Tab Take 1 Tab by mouth 3 times a day. 90 Tab 5   • NON SPECIFIED Bariatric Front wheel walker with seat and breaks. 1 Each 0   • sodium bicarbonate (SODIUM BICARBONATE) 650 MG Tab Take 650 mg by mouth 3 times a day.     • non-formulary med Take 15 mL  by mouth every 4 hours. CBD Oil/Sugar - every 4 hours for chornic pain     • fluoxetine (PROZAC) 10 MG Cap TAKE ONE CAPSULE BY MOUTH ONCE A DAY 30 Cap 11   • ziprasidone (GEODON) 80 MG Cap TAKE 2 CAPSULES BY MOUTH NIGHTLY AT BEDTIME 60 Cap 11   • fesoterodine fumarate (TOVIAZ) 4 MG TABLET SR 24 HR Take 1 Tab by mouth every day. 90 Tab 3   • albuterol 108 (90 Base) MCG/ACT Aero Soln inhalation aerosol Inhale 2 Puffs by mouth every 6 hours as needed for Shortness of Breath. 8.5 g 1   • gabapentin (NEURONTIN) 600 MG tablet Take 1 Tab by mouth 3 times a day. 270 Tab 3   • lactulose 10 GM/15ML Solution Take 10 g by mouth 2 times a day as needed (For constipation).     • aspirin EC (ECOTRIN) 81 MG Tablet Delayed Response Take 1 Tab by mouth every day. 30 Tab 6   • Melatonin 5 MG Tab Take 10 mg by mouth every bedtime.       No current facility-administered medications for this visit.          Allergies as of 03/15/2018 - Reviewed 03/15/2018   Allergen Reaction Noted   • Cefdinir Shortness of Breath and Itching 03/01/2016   • Depakote [divalproex sodium] Unspecified 06/14/2010   • Abilify Unspecified 01/17/2013   • Amitriptyline Unspecified 10/31/2013   • Amoxicillin Rash 09/18/2010   • Ciprofloxacin Rash 12/17/2009   • Clindamycin Nausea 02/02/2011   • Doxycycline Vomiting and Nausea 08/15/2012   • Ees [erythromycin] Vomiting and Nausea 08/28/2010   • Flagyl [metronidazole hcl] Unspecified 03/31/2011   • Flomax [tamsulosin hydrochloride] Swelling 09/24/2009   • Metformin Unspecified 07/23/2013   • Sulfa drugs Hives and Rash 09/18/2010   • Tape Rash 08/15/2012   • Vancomycin Itching 07/10/2016   • Wound dressing adhesive Hives 01/12/2018   • Cephalexin [keflex] Rash 01/01/2017   • Erythromycin Rash 03/30/2017   • Levofloxacin Unspecified 10/27/2016   • Metronidazole Rash 03/30/2017   • Valproic acid Rash 03/30/2017        ROS: Denies Chest pain, SOB, LE edema.    /70   Pulse 80   Temp 36.1 °C (97 °F)   Resp 16    "Ht 1.651 m (5' 5\")   Wt 122.5 kg (270 lb)   SpO2 95%   BMI 44.93 kg/m²      Physical Exam:  Gen:         Alert and oriented, No apparent distress.  Neck:        No Lymphadenopathy or Bruits.  Lungs:     Clear to auscultation bilaterally  CV:          Regular rate and rhythm. No murmurs, rubs or gallops.               Ext:          No clubbing, cyanosis, edema.      Assessment and Plan.   27 y.o. female with the following issues.    1. Type 2 diabetes mellitus without complication, without long-term current use of insulin (CMS-[facility])  Continuing current medication she will stop Januvia  - liraglutide (VICTOZA) 18 MG/3ML Solution Pen-injector injection; Inject 0.3 mL as instructed every day.  Dispense: 3 PEN; Refill: 6    2. Primary insomnia  Creasing trazodone to 100 mg.   - traZODone (DESYREL) 100 MG Tab; Take 1 Tab by mouth every bedtime.  Dispense: 90 Tab; Refill: 3    3. Hives  Recommend nonsedating antihistamine suspect hives mediated by anxiety she will look for new products in the home.      "

## 2018-03-16 ENCOUNTER — OFFICE VISIT (OUTPATIENT)
Dept: URGENT CARE | Facility: CLINIC | Age: 28
End: 2018-03-16
Payer: MEDICARE

## 2018-03-16 VITALS
WEIGHT: 270 LBS | RESPIRATION RATE: 16 BRPM | HEIGHT: 65 IN | DIASTOLIC BLOOD PRESSURE: 80 MMHG | OXYGEN SATURATION: 96 % | BODY MASS INDEX: 44.98 KG/M2 | SYSTOLIC BLOOD PRESSURE: 106 MMHG | HEART RATE: 79 BPM | TEMPERATURE: 98.4 F

## 2018-03-16 DIAGNOSIS — H92.01 RIGHT EAR PAIN: ICD-10-CM

## 2018-03-16 DIAGNOSIS — R30.0 DYSURIA: ICD-10-CM

## 2018-03-16 PROCEDURE — 99214 OFFICE O/P EST MOD 30 MIN: CPT | Performed by: FAMILY MEDICINE

## 2018-03-16 RX ORDER — PHENAZOPYRIDINE HYDROCHLORIDE 200 MG/1
200 TABLET, FILM COATED ORAL 3 TIMES DAILY
Qty: 6 TAB | Refills: 0 | Status: SHIPPED | OUTPATIENT
Start: 2018-03-16 | End: 2018-03-18

## 2018-03-16 RX ORDER — NITROFURANTOIN 25; 75 MG/1; MG/1
100 CAPSULE ORAL EVERY 12 HOURS
Qty: 10 CAP | Refills: 0 | Status: SHIPPED | OUTPATIENT
Start: 2018-03-16 | End: 2018-03-21

## 2018-03-16 RX ORDER — NEOMYCIN SULFATE, POLYMYXIN B SULFATE AND HYDROCORTISONE 10; 3.5; 1 MG/ML; MG/ML; [USP'U]/ML
5 SUSPENSION/ DROPS AURICULAR (OTIC) 3 TIMES DAILY
Qty: 11 ML | Refills: 0 | Status: SHIPPED | OUTPATIENT
Start: 2018-03-16 | End: 2018-03-23

## 2018-03-16 ASSESSMENT — ENCOUNTER SYMPTOMS
CHILLS: 0
HEMOPTYSIS: 0
FEVER: 0
FOCAL WEAKNESS: 0
SHORTNESS OF BREATH: 0
ORTHOPNEA: 0
DIZZINESS: 0

## 2018-03-16 NOTE — PROGRESS NOTES
"Subjective:      Kristin Balderrama is a 27 y.o. female who presents with Urinary Pain (C/o lower abd pain, dysuria, difficulty starting urination, blood clots in urine x1 day.  PT would like RT ear to be checked.)    Chief Complaint   Patient presents with   • Urinary Pain     C/o lower abd pain, dysuria, difficulty starting urination, blood clots in urine x1 day.  PT would like RT ear to be checked.        - This is a very pleasant 27 y.o. female with complaints of Rt ear pain x 2wks. No trauma/dc    - dysuria x 2 days           ALLERGIES:  Cefdinir; Depakote [divalproex sodium]; Abilify; Amitriptyline; Amoxicillin; Ciprofloxacin; Clindamycin; Doxycycline; Ees [erythromycin]; Flagyl [metronidazole hcl]; Flomax [tamsulosin hydrochloride]; Metformin; Sulfa drugs; Tape; Vancomycin; Wound dressing adhesive; Cephalexin [keflex]; Erythromycin; Levofloxacin; Metronidazole; and Valproic acid     PMH:  Past Medical History:   Diagnosis Date   • Anginal syndrome     random chest pain especially with tachycardia   • Arrhythmia     \"sinus tachycardia\", cariologist, Dr. Kumar; ablation 2/2016   • Arthritis     osteo   • ASTHMA     when around smoke   • Borderline personality disorder    • Breath shortness     with tachycardia   • Chronic UTI 9/18/2010   • Disorder of thyroid    • Fall    • Gynecological disorder     PCOS   • Headache(784.0)    • Heart burn    • History of falling    • Hypertension    • Migraine    • 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CMS-HCC)    • Multiple personality disorder    • Obesity    • Pain 08-15-12    back, 7/10   • PCOS (polycystic ovarian syndrome)    • Pneumonia 2012   • Psychosis    • Renal disorder     \"kidney disease, stage 1\" nephrologist, Dr. 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   • Urinary bladder disorder     Suprapubic cath   • Urinary incontinence  "        MEDS:    Current Outpatient Prescriptions:   •  neomycin-polymyxin-HC (PEDIOTIC HC) 3.5-12206-9 Suspension, Place 5 Drops in ear 3 times a day for 7 days., Disp: 11 mL, Rfl: 0  •  nitrofurantoin monohydr macro (MACROBID) 100 MG Cap, Take 1 Cap by mouth every 12 hours for 5 days., Disp: 10 Cap, Rfl: 0  •  phenazopyridine (PYRIDIUM) 200 MG Tab, Take 1 Tab by mouth 3 times a day for 2 days., Disp: 6 Tab, Rfl: 0  •  busPIRone (BUSPAR) 5 MG Tab, Take 1 Tab by mouth 2 times a day., Disp: 60 Tab, Rfl: 5  •  mupirocin (BACTROBAN) 2 % Ointment, Apply BID to infected sores x 1week, Disp: 30 g, Rfl: 0  •  nystatin/triamcinolone (MYCOLOG) 918969-2.1 UNIT/GM-% Cream, Apply TID to itchy area under breasts x 1week, Disp: 45 g, Rfl: 0  •  ivabradine (CORLANOR) 5 MG Tab tablet, Take 1 Tab by mouth 2 times a day, with meals., Disp: 60 Tab, Rfl: 11  •  baclofen (LIORESAL) 10 MG Tab, Take 1 Tab by mouth 3 times a day., Disp: 90 Tab, Rfl: 5  •  NON SPECIFIED, Bariatric Front wheel walker with seat and breaks., Disp: 1 Each, Rfl: 0  •  sodium bicarbonate (SODIUM BICARBONATE) 650 MG Tab, Take 650 mg by mouth 3 times a day., Disp: , Rfl:   •  non-formulary med, Take 15 mL by mouth every 4 hours. CBD Oil/Sugar - every 4 hours for chornic pain, Disp: , Rfl:   •  fluoxetine (PROZAC) 10 MG Cap, TAKE ONE CAPSULE BY MOUTH ONCE A DAY, Disp: 30 Cap, Rfl: 11  •  ziprasidone (GEODON) 80 MG Cap, TAKE 2 CAPSULES BY MOUTH NIGHTLY AT BEDTIME, Disp: 60 Cap, Rfl: 11  •  fesoterodine fumarate (TOVIAZ) 4 MG TABLET SR 24 HR, Take 1 Tab by mouth every day., Disp: 90 Tab, Rfl: 3  •  albuterol 108 (90 Base) MCG/ACT Aero Soln inhalation aerosol, Inhale 2 Puffs by mouth every 6 hours as needed for Shortness of Breath., Disp: 8.5 g, Rfl: 1  •  gabapentin (NEURONTIN) 600 MG tablet, Take 1 Tab by mouth 3 times a day., Disp: 270 Tab, Rfl: 3  •  lactulose 10 GM/15ML Solution, Take 10 g by mouth 2 times a day as needed (For constipation)., Disp: , Rfl:   •   "aspirin EC (ECOTRIN) 81 MG Tablet Delayed Response, Take 1 Tab by mouth every day., Disp: 30 Tab, Rfl: 6  •  Melatonin 5 MG Tab, Take 10 mg by mouth every bedtime., Disp: , Rfl:   •  liraglutide (VICTOZA) 18 MG/3ML Solution Pen-injector injection, Inject 0.3 mL as instructed every day., Disp: 3 PEN, Rfl: 6  •  traZODone (DESYREL) 100 MG Tab, Take 1 Tab by mouth every bedtime., Disp: 90 Tab, Rfl: 3    ** I have documented what I find to be significant in regards to past medical, social, family and surgical history  in my HPI or under PMH/PSH/FH review section, otherwise it is contributory **           HPI    Review of Systems   Constitutional: Negative for chills and fever.   Respiratory: Negative for hemoptysis and shortness of breath.    Cardiovascular: Negative for chest pain and orthopnea.   Neurological: Negative for dizziness and focal weakness.          Objective:     /80   Pulse 79   Temp 36.9 °C (98.4 °F)   Resp 16   Ht 1.651 m (5' 5\")   Wt 122.5 kg (270 lb)   SpO2 96%   BMI 44.93 kg/m²      Physical Exam   Constitutional: She appears well-developed. No distress.   HENT:   Head: Normocephalic and atraumatic.   Mouth/Throat: Oropharynx is clear and moist.   Eyes: Conjunctivae are normal.   Neck: Neck supple.   Cardiovascular: Regular rhythm.    No murmur heard.  Pulmonary/Chest: Effort normal. No respiratory distress.   Neurological: She is alert. She exhibits normal muscle tone.   Skin: Skin is warm and dry.   Psychiatric: She has a normal mood and affect. Judgment normal.   Nursing note and vitals reviewed.  Rt ear: tender tragus and EAC red tender, TM wnl             Assessment/Plan:         1. Dysuria  nitrofurantoin monohydr macro (MACROBID) 100 MG Cap    phenazopyridine (PYRIDIUM) 200 MG Tab   2. Right ear pain  neomycin-polymyxin-HC (PEDIOTIC HC) 3.5-73415-9 Suspension             Dx & d/c instructions discussed w/ patient and/or family members. Follow up w/ Prvt Dr or here in 3-4 days if " not getting better, sooner if needed,  ER if worse and UC/PCP unavailable.        Possible side effects (i.e. Rash, GI upset/constipation, sedation, elevation of BP or sugars) of any medications given discussed.

## 2018-03-17 RX ORDER — ONDANSETRON 4 MG/1
4 TABLET, ORALLY DISINTEGRATING ORAL EVERY 8 HOURS PRN
Qty: 12 TAB | Refills: 0 | Status: SHIPPED | OUTPATIENT
Start: 2018-03-17 | End: 2018-07-14

## 2018-03-21 ENCOUNTER — OFFICE VISIT (OUTPATIENT)
Dept: MEDICAL GROUP | Facility: MEDICAL CENTER | Age: 28
End: 2018-03-21
Payer: MEDICARE

## 2018-03-21 VITALS
DIASTOLIC BLOOD PRESSURE: 74 MMHG | HEART RATE: 85 BPM | SYSTOLIC BLOOD PRESSURE: 112 MMHG | RESPIRATION RATE: 16 BRPM | TEMPERATURE: 98.3 F | WEIGHT: 271 LBS | BODY MASS INDEX: 45.15 KG/M2 | OXYGEN SATURATION: 98 % | HEIGHT: 65 IN

## 2018-03-21 DIAGNOSIS — R05.9 COUGH: ICD-10-CM

## 2018-03-21 DIAGNOSIS — R11.2 NAUSEA AND VOMITING, INTRACTABILITY OF VOMITING NOT SPECIFIED, UNSPECIFIED VOMITING TYPE: ICD-10-CM

## 2018-03-21 DIAGNOSIS — R19.7 DIARRHEA, UNSPECIFIED TYPE: ICD-10-CM

## 2018-03-21 PROCEDURE — 99214 OFFICE O/P EST MOD 30 MIN: CPT | Performed by: INTERNAL MEDICINE

## 2018-03-21 RX ORDER — PROMETHAZINE HYDROCHLORIDE 25 MG/1
25 TABLET ORAL EVERY 6 HOURS PRN
Qty: 30 TAB | Refills: 6 | Status: SHIPPED | OUTPATIENT
Start: 2018-03-21 | End: 2018-07-02

## 2018-03-21 NOTE — PROGRESS NOTES
CC: Vomiting, diarrhea, cough.    HPI:   Kristin presents today with the following.    1. Nausea and vomiting, intractability of vomiting not specified, unspecified vomiting type  Presents complaining of 4 days of nausea. She has not had any new medications. She does report low-grade fevers. There is no abdominal pain no hematemesis. She denies any dizziness is now keeping fluids down. She's been taking nausea medication    2. Diarrhea, unspecified type  Having profuse diarrhea which has improved slightly over the last 24 hours.     3. Cough  Biggest concern is a cough that she was worried that she aspirated again denies any fevers or chills and the cough is fairly nonproductive. She is not having any increased difficulty breathing.      Patient Active Problem List    Diagnosis Date Noted   • Sinus tachycardia 10/31/2013     Priority: High   • Chronic inflammatory arthritis 05/23/2016     Priority: Medium   • Depression 10/28/2016     Priority: Low   • Schizophrenia (CMS-[facility]) 10/27/2016     Priority: Low   • Psychosis, schizophrenia, simple ([facility]) 09/29/2016     Priority: Low     Class: Chronic   • Acquired hypothyroidism 11/23/2015     Priority: Low   • PCOS (polycystic ovarian syndrome) 11/23/2015     Priority: Low   • Progressive focal motor weakness 06/28/2015     Priority: Low   • Fatty liver disease, nonalcoholic 01/19/2015     Priority: Low   • Anxiety 12/16/2014     Priority: Low   • Knee pain, right 02/13/2014     Priority: Low   • Neurogenic bladder 04/02/2011     Priority: Low   • Chronic UTI 09/18/2010     Priority: Low   • Borderline personality disorder in adult 09/18/2010     Priority: Low   • TONYA (obstructive sleep apnea) 01/09/2018   • Functional diarrhea 01/05/2018   • Breast wound 11/06/2017   • Morbid obesity with BMI of 45.0-49.9, adult ([facility]) 10/24/2017   • Intractable episodic cluster headache 09/14/2017   • Rash 06/09/2017   • Hashimoto's encephalopathy 05/17/2017   • Fall at home 05/13/2017    • Type 2 diabetes mellitus without complication, without long-term current use of insulin (CMS-HCC) 04/26/2017   • On home oxygen therapy 04/15/2017   • Chest pain 03/30/2017   • Weakness of right upper extremity 02/23/2017   • Chronic suprapubic catheter (CMS-HCC) 02/16/2017   • Hypovitaminosis D 11/29/2016   • HTN (hypertension) 11/01/2016   • Chronic pain syndrome 10/27/2016   • Bowel and bladder incontinence 10/27/2016   • Galactorrhea 07/22/2016   • Elevated sedimentation rate 06/27/2016   • Weakness of both lower extremities 06/22/2016   • Vitamin D deficiency 05/21/2016   • Morbidly obese (CMS-HCC) 03/07/2016   • Scoliosis 03/07/2016   • GERD (gastroesophageal reflux disease) 03/07/2016   • Peripheral neuropathy (CMS-HCC) 03/06/2016   • H/O prior ablation treatment 02/10/2016       Current Outpatient Prescriptions   Medication Sig Dispense Refill   • promethazine (PHENERGAN) 25 MG Tab Take 1 Tab by mouth every 6 hours as needed for Nausea/Vomiting. 30 Tab 6   • ondansetron (ZOFRAN ODT) 4 MG TABLET DISPERSIBLE Take 1 Tab by mouth every 8 hours as needed. 12 Tab 0   • neomycin-polymyxin-HC (PEDIOTIC HC) 3.5-14795-3 Suspension Place 5 Drops in ear 3 times a day for 7 days. 11 mL 0   • liraglutide (VICTOZA) 18 MG/3ML Solution Pen-injector injection Inject 0.3 mL as instructed every day. 3 PEN 6   • traZODone (DESYREL) 100 MG Tab Take 1 Tab by mouth every bedtime. 90 Tab 3   • busPIRone (BUSPAR) 5 MG Tab Take 1 Tab by mouth 2 times a day. 60 Tab 5   • mupirocin (BACTROBAN) 2 % Ointment Apply BID to infected sores x 1week 30 g 0   • nystatin/triamcinolone (MYCOLOG) 173324-0.1 UNIT/GM-% Cream Apply TID to itchy area under breasts x 1week 45 g 0   • ivabradine (CORLANOR) 5 MG Tab tablet Take 1 Tab by mouth 2 times a day, with meals. 60 Tab 11   • baclofen (LIORESAL) 10 MG Tab Take 1 Tab by mouth 3 times a day. 90 Tab 5   • NON SPECIFIED Bariatric Front wheel walker with seat and breaks. 1 Each 0   • sodium  bicarbonate (SODIUM BICARBONATE) 650 MG Tab Take 650 mg by mouth 3 times a day.     • non-formulary med Take 15 mL by mouth every 4 hours. CBD Oil/Sugar - every 4 hours for chornic pain     • fluoxetine (PROZAC) 10 MG Cap TAKE ONE CAPSULE BY MOUTH ONCE A DAY 30 Cap 11   • ziprasidone (GEODON) 80 MG Cap TAKE 2 CAPSULES BY MOUTH NIGHTLY AT BEDTIME 60 Cap 11   • fesoterodine fumarate (TOVIAZ) 4 MG TABLET SR 24 HR Take 1 Tab by mouth every day. 90 Tab 3   • albuterol 108 (90 Base) MCG/ACT Aero Soln inhalation aerosol Inhale 2 Puffs by mouth every 6 hours as needed for Shortness of Breath. 8.5 g 1   • gabapentin (NEURONTIN) 600 MG tablet Take 1 Tab by mouth 3 times a day. 270 Tab 3   • lactulose 10 GM/15ML Solution Take 10 g by mouth 2 times a day as needed (For constipation).     • aspirin EC (ECOTRIN) 81 MG Tablet Delayed Response Take 1 Tab by mouth every day. 30 Tab 6   • Melatonin 5 MG Tab Take 10 mg by mouth every bedtime.       No current facility-administered medications for this visit.          Allergies as of 03/21/2018 - Reviewed 03/21/2018   Allergen Reaction Noted   • Cefdinir Shortness of Breath and Itching 03/01/2016   • Depakote [divalproex sodium] Unspecified 06/14/2010   • Abilify Unspecified 01/17/2013   • Amitriptyline Unspecified 10/31/2013   • Amoxicillin Rash 09/18/2010   • Ciprofloxacin Rash 12/17/2009   • Clindamycin Nausea 02/02/2011   • Doxycycline Vomiting and Nausea 08/15/2012   • Ees [erythromycin] Vomiting and Nausea 08/28/2010   • Flagyl [metronidazole hcl] Unspecified 03/31/2011   • Flomax [tamsulosin hydrochloride] Swelling 09/24/2009   • Metformin Unspecified 07/23/2013   • Sulfa drugs Hives and Rash 09/18/2010   • Tape Rash 08/15/2012   • Vancomycin Itching 07/10/2016   • Wound dressing adhesive Hives 01/12/2018   • Cephalexin [keflex] Rash 01/01/2017   • Erythromycin Rash 03/30/2017   • Levofloxacin Unspecified 10/27/2016   • Metronidazole Rash 03/30/2017   • Valproic acid Rash  "03/30/2017        ROS: Denies Chest pain, SOB, LE edema.    /74   Pulse 85   Temp 36.8 °C (98.3 °F)   Resp 16   Ht 1.651 m (5' 5\")   Wt 122.9 kg (271 lb)   SpO2 98%   BMI 45.10 kg/m²      Physical Exam:  Gen:         Alert and oriented, No apparent distress.  Neck:        No Lymphadenopathy or Bruits.  Lungs:     Clear to auscultation bilaterally  CV:          Regular rate and rhythm. No murmurs, rubs or gallops.               Ext:          No clubbing, cyanosis, edema.      Assessment and Plan.   27 y.o. female with the following issues.    1. Nausea and vomiting, intractability of vomiting not specified, unspecified vomiting type  Have instructed patient for over-the-counter Imodium as well importance of maintaining good hydration. Recommended a bland diet and followup if diarrhea is not resolving by 2 weeks or sooner if symptoms should change or worsen. Given ER precautions if she worsens and cannot keep fluid down and display signs of dehydration.    2. Diarrhea, unspecified type  Recommended over-the-counter Imodium.    3. Cough  Lungs sound clear but if should worsen she will get x-ray.  - DX-CHEST-2 VIEWS; Future      "

## 2018-03-26 ENCOUNTER — HOSPITAL ENCOUNTER (OUTPATIENT)
Dept: RADIOLOGY | Facility: MEDICAL CENTER | Age: 28
End: 2018-03-26
Attending: INTERNAL MEDICINE
Payer: MEDICARE

## 2018-03-26 DIAGNOSIS — R05.9 COUGH: ICD-10-CM

## 2018-03-26 PROCEDURE — 71046 X-RAY EXAM CHEST 2 VIEWS: CPT

## 2018-04-01 ENCOUNTER — OFFICE VISIT (OUTPATIENT)
Dept: URGENT CARE | Facility: CLINIC | Age: 28
End: 2018-04-01
Payer: MEDICARE

## 2018-04-01 VITALS
BODY MASS INDEX: 45.15 KG/M2 | RESPIRATION RATE: 18 BRPM | SYSTOLIC BLOOD PRESSURE: 122 MMHG | HEIGHT: 65 IN | WEIGHT: 271 LBS | DIASTOLIC BLOOD PRESSURE: 88 MMHG | HEART RATE: 101 BPM | TEMPERATURE: 98 F | OXYGEN SATURATION: 95 %

## 2018-04-01 DIAGNOSIS — L08.9 TOE INFECTION: ICD-10-CM

## 2018-04-01 PROCEDURE — 99214 OFFICE O/P EST MOD 30 MIN: CPT | Performed by: PHYSICIAN ASSISTANT

## 2018-04-01 RX ORDER — CLINDAMYCIN HYDROCHLORIDE 300 MG/1
300 CAPSULE ORAL 3 TIMES DAILY
Qty: 21 CAP | Refills: 0 | Status: SHIPPED | OUTPATIENT
Start: 2018-04-01 | End: 2018-04-18

## 2018-04-01 ASSESSMENT — ENCOUNTER SYMPTOMS
CONSTITUTIONAL NEGATIVE: 1
CARDIOVASCULAR NEGATIVE: 1
GASTROINTESTINAL NEGATIVE: 1
RESPIRATORY NEGATIVE: 1

## 2018-04-01 NOTE — PROGRESS NOTES
Subjective:      Kristin Balderrama is a 27 y.o. female who presents with Toe Pain (toe infection x 3 days )            Foot Problem   This is a new problem. The current episode started in the past 7 days. The problem occurs constantly. The problem has been gradually worsening. The symptoms are aggravated by walking and standing. Treatments tried: Bactroban. The treatment provided no relief.   Patient bumped her left foot and ripped off her great toenail. No having redness and pain. History of diabetes    PMH:  has a past medical history of Anginal syndrome; Arrhythmia; Arthritis; ASTHMA; Borderline personality disorder; Breath shortness; Chronic UTI (9/18/2010); Disorder of thyroid; Fall; Gynecological disorder; Headache(784.0); Heart burn; History of falling; Hypertension; Migraine; 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CMS-[facility]); Multiple personality disorder; Obesity; Pain (08-15-12); PCOS (polycystic ovarian syndrome); Pneumonia (2012); Psychosis; Renal disorder; Scoliosis; Sinus tachycardia (10/31/2013); Sleep apnea; Tuberculosis; Urinary bladder disorder; and Urinary incontinence.  MEDS:   Current Outpatient Prescriptions:   •  clindamycin (CLEOCIN) 300 MG Cap, Take 1 Cap by mouth 3 times a day., Disp: 21 Cap, Rfl: 0  •  promethazine (PHENERGAN) 25 MG Tab, Take 1 Tab by mouth every 6 hours as needed for Nausea/Vomiting., Disp: 30 Tab, Rfl: 6  •  ondansetron (ZOFRAN ODT) 4 MG TABLET DISPERSIBLE, Take 1 Tab by mouth every 8 hours as needed., Disp: 12 Tab, Rfl: 0  •  liraglutide (VICTOZA) 18 MG/3ML Solution Pen-injector injection, Inject 0.3 mL as instructed every day., Disp: 3 PEN, Rfl: 6  •  traZODone (DESYREL) 100 MG Tab, Take 1 Tab by mouth every bedtime., Disp: 90 Tab, Rfl: 3  •  busPIRone (BUSPAR) 5 MG Tab, Take 1 Tab by mouth 2 times a day., Disp: 60 Tab, Rfl: 5  •  mupirocin (BACTROBAN) 2 % Ointment, Apply BID to infected sores x 1week, Disp: 30 g, Rfl: 0  •  nystatin/triamcinolone (MYCOLOG) 125613-7.1 UNIT/GM-%  "Cream, Apply TID to itchy area under breasts x 1week, Disp: 45 g, Rfl: 0  •  ivabradine (CORLANOR) 5 MG Tab tablet, Take 1 Tab by mouth 2 times a day, with meals., Disp: 60 Tab, Rfl: 11  •  baclofen (LIORESAL) 10 MG Tab, Take 1 Tab by mouth 3 times a day., Disp: 90 Tab, Rfl: 5  •  NON SPECIFIED, Bariatric Front wheel walker with seat and breaks., Disp: 1 Each, Rfl: 0  •  sodium bicarbonate (SODIUM BICARBONATE) 650 MG Tab, Take 650 mg by mouth 3 times a day., Disp: , Rfl:   •  fluoxetine (PROZAC) 10 MG Cap, TAKE ONE CAPSULE BY MOUTH ONCE A DAY, Disp: 30 Cap, Rfl: 11  •  ziprasidone (GEODON) 80 MG Cap, TAKE 2 CAPSULES BY MOUTH NIGHTLY AT BEDTIME, Disp: 60 Cap, Rfl: 11  •  fesoterodine fumarate (TOVIAZ) 4 MG TABLET SR 24 HR, Take 1 Tab by mouth every day., Disp: 90 Tab, Rfl: 3  •  gabapentin (NEURONTIN) 600 MG tablet, Take 1 Tab by mouth 3 times a day., Disp: 270 Tab, Rfl: 3  •  lactulose 10 GM/15ML Solution, Take 10 g by mouth 2 times a day as needed (For constipation)., Disp: , Rfl:   •  aspirin EC (ECOTRIN) 81 MG Tablet Delayed Response, Take 1 Tab by mouth every day., Disp: 30 Tab, Rfl: 6  •  Melatonin 5 MG Tab, Take 10 mg by mouth every bedtime., Disp: , Rfl:   •  non-formulary med, Take 15 mL by mouth every 4 hours. CBD Oil/Sugar - every 4 hours for chornic pain, Disp: , Rfl:   •  albuterol 108 (90 Base) MCG/ACT Aero Soln inhalation aerosol, Inhale 2 Puffs by mouth every 6 hours as needed for Shortness of Breath., Disp: 8.5 g, Rfl: 1  ALLERGIES:   Allergies   Allergen Reactions   • Cefdinir Shortness of Breath and Itching     Tolerated 1/18/17   • Depakote [Divalproex Sodium] Unspecified     Muscle spasms/muscle pain and weakness     • Abilify Unspecified     Headaches/muscle twitching     • Amitriptyline Unspecified     Headaches     • Amoxicillin Rash     Pt states \"I get a rash\".     • Ciprofloxacin Rash     Pt states \"I get a rash\".     • Clindamycin Nausea     Even with food     • Doxycycline Vomiting and " "Nausea     RXN=unknown   • Ees [Erythromycin] Vomiting and Nausea   • Flagyl [Metronidazole Hcl] Unspecified     \"eye problems\"     • Flomax [Tamsulosin Hydrochloride] Swelling   • Metformin Unspecified     Increased lactic acid      • Sulfa Drugs Hives and Rash     RXN=since childhood   • Tape Rash     Tears skin off   coban with Tegaderm tape ok  RXN=ongoing   • Vancomycin Itching     Pt becomes flushed in face and chest.   RXN=7/10/16   • Wound Dressing Adhesive Hives     By pt report   • Cephalexin [Keflex] Rash     Pt states she gets a rash with this medication   • Erythromycin Rash     .   • Levofloxacin Unspecified     Leg muscle cramps   • Metronidazole Rash     .   • Valproic Acid Rash     .     SURGHX:   Past Surgical History:   Procedure Laterality Date   • MUSCLE BIOPSY Right 1/26/2017    Procedure: MUSCLE BIOPSY - THIGH;  Surgeon: Isidro Vigil M.D.;  Location: [facility];  Service:    • GASTROSCOPY WITH BALLOON DILATATION N/A 1/4/2017    Procedure: GASTROSCOPY WITH DILATATION;  Surgeon: Torres Vargas M.D.;  Location: [facility];  Service:    • BOWEL STIMULATOR INSERTION  7/13/2016    Procedure: BOWEL STIMULATOR INSERTION FOR PERMANENT INTERSTIM SACRAL IMPLANT;  Surgeon: Joe Noyola M.D.;  Location: [facility];  Service:    • RECOVERY  1/27/2016    Procedure: CATH LAB EP STUDY WITH SINUS NODE MODIFICATION ABHINAV;  Surgeon: Recoveryonly Surgery;  Location: SURGERY PRE-POST PROC UNIT [facility];  Service:    • OTHER CARDIAC SURGERY  1/2016    cardiac ablation   • NEURO DEST FACET L/S W/IG SNGL  4/21/2015    Performed by Reza Tabor at [facility]   • LUMBAR FUSION ANTERIOR  8/21/2012    Performed by SUSIE SAWANT at [facility] ORS   • ALYSSA BY LAPAROSCOPY  8/29/2010    Performed by SHAYY JOHNS at [facility] ORS   • OTHER ABDOMINAL SURGERY     • TONSILLECTOMY      tonsillectomy     SOCHX:  reports that she has never smoked. She has " "never used smokeless tobacco. She reports that she uses drugs, including Marijuana. She reports that she does not drink alcohol.  FH: family history includes Genitourinary () in her sister; Heart Disease in her maternal grandmother and mother; Hypertension in her maternal grandmother, maternal uncle, and mother; Other in her mother and sister; Sleep Apnea in her mother.    Review of Systems   Constitutional: Negative.    HENT: Negative.    Respiratory: Negative.    Cardiovascular: Negative.    Gastrointestinal: Negative.    Skin:        Ripped toenail with infection left foot       Medications, Allergies, and current problem list reviewed today in Epic     Objective:     /88   Pulse (!) 101   Temp 36.7 °C (98 °F)   Resp 18   Ht 1.651 m (5' 5\")   Wt 122.9 kg (271 lb)   SpO2 95%   BMI 45.10 kg/m²      Physical Exam   Constitutional: She is oriented to person, place, and time. She appears well-developed and well-nourished. No distress.   HENT:   Head: Normocephalic and atraumatic.   Eyes: Conjunctivae and EOM are normal.   Neck: Normal range of motion. Neck supple.   Cardiovascular: Normal rate, regular rhythm and normal heart sounds.    Pulmonary/Chest: Effort normal and breath sounds normal. No respiratory distress. She has no wheezes.   Musculoskeletal:        Feet:    Missing toenail left great toe. Surrounding erythema and tenderness. Range of motion normal. No bony tenderness. Distal neurovascular intact.   Neurological: She is alert and oriented to person, place, and time.   Skin: Skin is warm and dry. She is not diaphoretic.   Psychiatric: She has a normal mood and affect. Her behavior is normal. Judgment and thought content normal.   Nursing note and vitals reviewed.              Assessment/Plan:     1. Toe infection  clindamycin (CLEOCIN) 300 MG Cap     Toe injury with subsequent infection. Clindamycin 3 times a day. Patient states she tolerates this medication just has mild gastric " symptoms.  OTC meds and conservative measures as discussed  Return to clinic or go to ED if symptoms worsen or persist. Indications for ED discussed at length. Patient voices understanding. Follow-up with your primary care provider in 3-5 days. Red flags discussed. All side effects of medication discussed including allergic response, GI upset, tendon injury, etc.    Please note that this dictation was created using voice recognition software. I have made every reasonable attempt to correct obvious errors, but I expect that there are errors of grammar and possibly content that I did not discover before finalizing the note.

## 2018-04-05 ENCOUNTER — PATIENT OUTREACH (OUTPATIENT)
Dept: HEALTH INFORMATION MANAGEMENT | Facility: OTHER | Age: 28
End: 2018-04-05

## 2018-04-05 NOTE — PROGRESS NOTES
1. Attempt #: 1    2. [facility] Verified: yes    3. Verify PCP: yes    4. Care Team Updated:       •   DME Company (gait device, O2, CPAP, etc.): YES       •   Other Specialists (eye doctor, derm, GYN, cardiology, endo, etc): YES    5.  Reviewed/Updated the following with patient:       •   Communication Preference Obtained? YES       •   Preferred Pharmacy? YES       •   Preferred Lab? YES       •   Family History (document living status of immediate family members and if + hx of cancer, diabetes, hypertension, hyperlipidemia, heart attack, stroke) YES. Was Abstract Encounter opened and chart updated? YES    6. [employer] Activation: already active    7. [employer] Rocio: no    8. Annual Wellness Visit Scheduling  Scheduling Status:Scheduled      9. Care Gap Scheduling (Attempt to Schedule EACH Overdue Care Gap!)     Health Maintenance Due   Topic Date Due   • Annual Wellness Visit  07/23/2017        Scheduled patient for Annual Wellness Visit    10. Patient was advised: “This is a free wellness visit. The provider will screen for medical conditions to help you stay healthy. If you have other concerns to address you may be asked to discuss these at a separate visit or there may be an additional fee.”     11. Patient was informed to arrive 15 min prior to their scheduled appointment and bring in their medication bottles.

## 2018-04-06 ENCOUNTER — OFFICE VISIT (OUTPATIENT)
Dept: MEDICAL GROUP | Facility: MEDICAL CENTER | Age: 28
End: 2018-04-06
Payer: MEDICARE

## 2018-04-06 ENCOUNTER — HOSPITAL ENCOUNTER (OUTPATIENT)
Dept: LAB | Facility: MEDICAL CENTER | Age: 28
End: 2018-04-06
Attending: INTERNAL MEDICINE
Payer: MEDICARE

## 2018-04-06 VITALS
SYSTOLIC BLOOD PRESSURE: 122 MMHG | OXYGEN SATURATION: 100 % | BODY MASS INDEX: 45.48 KG/M2 | HEIGHT: 65 IN | HEART RATE: 89 BPM | DIASTOLIC BLOOD PRESSURE: 82 MMHG | TEMPERATURE: 99.1 F | RESPIRATION RATE: 16 BRPM | WEIGHT: 273 LBS

## 2018-04-06 DIAGNOSIS — M79.674 PAIN OF TOE OF RIGHT FOOT: ICD-10-CM

## 2018-04-06 DIAGNOSIS — R19.7 DIARRHEA, UNSPECIFIED TYPE: ICD-10-CM

## 2018-04-06 DIAGNOSIS — R79.89 LFTS ABNORMAL: ICD-10-CM

## 2018-04-06 LAB
ALBUMIN SERPL BCP-MCNC: 4.4 G/DL (ref 3.2–4.9)
ALBUMIN/GLOB SERPL: 1.8 G/DL
ALP SERPL-CCNC: 67 U/L (ref 30–99)
ALT SERPL-CCNC: 93 U/L (ref 2–50)
ANION GAP SERPL CALC-SCNC: 12 MMOL/L (ref 0–11.9)
AST SERPL-CCNC: 54 U/L (ref 12–45)
BILIRUB SERPL-MCNC: 0.5 MG/DL (ref 0.1–1.5)
BUN SERPL-MCNC: 8 MG/DL (ref 8–22)
CALCIUM SERPL-MCNC: 9.6 MG/DL (ref 8.5–10.5)
CHLORIDE SERPL-SCNC: 104 MMOL/L (ref 96–112)
CO2 SERPL-SCNC: 22 MMOL/L (ref 20–33)
CREAT SERPL-MCNC: 0.7 MG/DL (ref 0.5–1.4)
GLOBULIN SER CALC-MCNC: 2.5 G/DL (ref 1.9–3.5)
GLUCOSE SERPL-MCNC: 169 MG/DL (ref 65–99)
HAV IGM SERPL QL IA: NEGATIVE
HBV CORE IGM SER QL: NEGATIVE
HBV SURFACE AB SERPL IA-ACNC: 18.79 MIU/ML (ref 0–10)
HBV SURFACE AG SER QL: NEGATIVE
HCV AB SER QL: NEGATIVE
POTASSIUM SERPL-SCNC: 3.7 MMOL/L (ref 3.6–5.5)
PROT SERPL-MCNC: 6.9 G/DL (ref 6–8.2)
SODIUM SERPL-SCNC: 138 MMOL/L (ref 135–145)

## 2018-04-06 PROCEDURE — 86706 HEP B SURFACE ANTIBODY: CPT

## 2018-04-06 PROCEDURE — 99214 OFFICE O/P EST MOD 30 MIN: CPT | Performed by: INTERNAL MEDICINE

## 2018-04-06 PROCEDURE — 80074 ACUTE HEPATITIS PANEL: CPT

## 2018-04-06 PROCEDURE — 36415 COLL VENOUS BLD VENIPUNCTURE: CPT

## 2018-04-06 PROCEDURE — 80053 COMPREHEN METABOLIC PANEL: CPT

## 2018-04-06 NOTE — PROGRESS NOTES
CC: Follow-up emergency room diarrhea no T abnormalities complaining of toe pain.    HPI:   Kristin presents today with the following.    1. LFTs abnormal  Was emergency room for persistent diarrhea she reports she had abnormal liver function tests. Records are not currently available. She denies recurrent nausea vomiting no abdominal pain.    2. Diarrhea, unspecified type  Began having persistent diarrhea she was referred to gastroenterology ever never made the appointment. She is stopping 4-5 watery stools per day.    3. Pain of toe of right foot  She is complaining of pain in her great toe with some slight redness. She denies any drainage no fevers or chills.      Patient Active Problem List    Diagnosis Date Noted   • Sinus tachycardia 10/31/2013     Priority: High   • Chronic inflammatory arthritis 05/23/2016     Priority: Medium   • Depression 10/28/2016     Priority: Low   • Schizophrenia (CMS-Formerly [facility]) 10/27/2016     Priority: Low   • Psychosis, schizophrenia, simple (Formerly [facility]) 09/29/2016     Priority: Low     Class: Chronic   • Acquired hypothyroidism 11/23/2015     Priority: Low   • PCOS (polycystic ovarian syndrome) 11/23/2015     Priority: Low   • Progressive focal motor weakness 06/28/2015     Priority: Low   • Fatty liver disease, nonalcoholic 01/19/2015     Priority: Low   • Anxiety 12/16/2014     Priority: Low   • Knee pain, right 02/13/2014     Priority: Low   • Neurogenic bladder 04/02/2011     Priority: Low   • Chronic UTI 09/18/2010     Priority: Low   • Borderline personality disorder in adult 09/18/2010     Priority: Low   • TONYA (obstructive sleep apnea) 01/09/2018   • Functional diarrhea 01/05/2018   • Breast wound 11/06/2017   • Morbid obesity with BMI of 45.0-49.9, adult (Formerly [facility]) 10/24/2017   • Intractable episodic cluster headache 09/14/2017   • Rash 06/09/2017   • Hashimoto's encephalopathy 05/17/2017   • Fall at home 05/13/2017   • Type 2 diabetes mellitus without complication, without long-term  current use of insulin (CMS-HCC) 04/26/2017   • On home oxygen therapy 04/15/2017   • Chest pain 03/30/2017   • Weakness of right upper extremity 02/23/2017   • Chronic suprapubic catheter (CMS-HCC) 02/16/2017   • Hypovitaminosis D 11/29/2016   • HTN (hypertension) 11/01/2016   • Chronic pain syndrome 10/27/2016   • Bowel and bladder incontinence 10/27/2016   • Galactorrhea 07/22/2016   • Elevated sedimentation rate 06/27/2016   • Weakness of both lower extremities 06/22/2016   • Vitamin D deficiency 05/21/2016   • Morbidly obese (CMS-HCC) 03/07/2016   • Scoliosis 03/07/2016   • GERD (gastroesophageal reflux disease) 03/07/2016   • Peripheral neuropathy (CMS-HCC) 03/06/2016   • H/O prior ablation treatment 02/10/2016       Current Outpatient Prescriptions   Medication Sig Dispense Refill   • clindamycin (CLEOCIN) 300 MG Cap Take 1 Cap by mouth 3 times a day. 21 Cap 0   • promethazine (PHENERGAN) 25 MG Tab Take 1 Tab by mouth every 6 hours as needed for Nausea/Vomiting. 30 Tab 6   • ondansetron (ZOFRAN ODT) 4 MG TABLET DISPERSIBLE Take 1 Tab by mouth every 8 hours as needed. 12 Tab 0   • liraglutide (VICTOZA) 18 MG/3ML Solution Pen-injector injection Inject 0.3 mL as instructed every day. 3 PEN 6   • traZODone (DESYREL) 100 MG Tab Take 1 Tab by mouth every bedtime. 90 Tab 3   • busPIRone (BUSPAR) 5 MG Tab Take 1 Tab by mouth 2 times a day. 60 Tab 5   • mupirocin (BACTROBAN) 2 % Ointment Apply BID to infected sores x 1week 30 g 0   • nystatin/triamcinolone (MYCOLOG) 907588-5.1 UNIT/GM-% Cream Apply TID to itchy area under breasts x 1week 45 g 0   • ivabradine (CORLANOR) 5 MG Tab tablet Take 1 Tab by mouth 2 times a day, with meals. 60 Tab 11   • baclofen (LIORESAL) 10 MG Tab Take 1 Tab by mouth 3 times a day. 90 Tab 5   • NON SPECIFIED Bariatric Front wheel walker with seat and breaks. 1 Each 0   • sodium bicarbonate (SODIUM BICARBONATE) 650 MG Tab Take 650 mg by mouth 3 times a day.     • non-formulary med Take 15 mL  by mouth every 4 hours. CBD Oil/Sugar - every 4 hours for chornic pain     • fluoxetine (PROZAC) 10 MG Cap TAKE ONE CAPSULE BY MOUTH ONCE A DAY 30 Cap 11   • ziprasidone (GEODON) 80 MG Cap TAKE 2 CAPSULES BY MOUTH NIGHTLY AT BEDTIME 60 Cap 11   • fesoterodine fumarate (TOVIAZ) 4 MG TABLET SR 24 HR Take 1 Tab by mouth every day. 90 Tab 3   • albuterol 108 (90 Base) MCG/ACT Aero Soln inhalation aerosol Inhale 2 Puffs by mouth every 6 hours as needed for Shortness of Breath. 8.5 g 1   • gabapentin (NEURONTIN) 600 MG tablet Take 1 Tab by mouth 3 times a day. 270 Tab 3   • lactulose 10 GM/15ML Solution Take 10 g by mouth 2 times a day as needed (For constipation).     • aspirin EC (ECOTRIN) 81 MG Tablet Delayed Response Take 1 Tab by mouth every day. 30 Tab 6   • Melatonin 5 MG Tab Take 10 mg by mouth every bedtime.       No current facility-administered medications for this visit.          Allergies as of 04/06/2018 - Reviewed 04/06/2018   Allergen Reaction Noted   • Cefdinir Shortness of Breath and Itching 03/01/2016   • Depakote [divalproex sodium] Unspecified 06/14/2010   • Abilify Unspecified 01/17/2013   • Amitriptyline Unspecified 10/31/2013   • Amoxicillin Rash 09/18/2010   • Ciprofloxacin Rash 12/17/2009   • Clindamycin Nausea 02/02/2011   • Doxycycline Vomiting and Nausea 08/15/2012   • Ees [erythromycin] Vomiting and Nausea 08/28/2010   • Flagyl [metronidazole hcl] Unspecified 03/31/2011   • Flomax [tamsulosin hydrochloride] Swelling 09/24/2009   • Metformin Unspecified 07/23/2013   • Sulfa drugs Hives and Rash 09/18/2010   • Tape Rash 08/15/2012   • Vancomycin Itching 07/10/2016   • Wound dressing adhesive Hives 01/12/2018   • Cephalexin [keflex] Rash 01/01/2017   • Erythromycin Rash 03/30/2017   • Levofloxacin Unspecified 10/27/2016   • Metronidazole Rash 03/30/2017   • Valproic acid Rash 03/30/2017        ROS: Denies Chest pain, SOB, LE edema.    /82   Pulse 89   Temp 37.3 °C (99.1 °F)   Resp 16    "Ht 1.651 m (5' 5\")   Wt 123.8 kg (273 lb)   SpO2 100%   BMI 45.43 kg/m²      Physical Exam:  Gen:         Alert and oriented, No apparent distress.  Neck:        No Lymphadenopathy or Bruits.  Lungs:     Clear to auscultation bilaterally  CV:          Regular rate and rhythm. No murmurs, rubs or gallops.               ABD:      Soft non tender, non distended. Normal active bowel sounds.  No  Hepatosplenomegaly, No pulsatile masses.      Ext:          No clubbing, cyanosis, edema.      Assessment and Plan.   27 y.o. female with the following issues.    1. LFTs abnormal  They were concerned for viral hepatitis have sent for blood work as well as repeat liver function tests. We'll try and get records.  - HEP C VIRUS ANTIBODY; Future  - HEP B CORE AB IGM; Future  - HEP B SURFACE ANTIGEN; Future  - HEP B SURFACE AB; Future  - HEP A AB IGM; Future  - COMP METABOLIC PANEL; Future    2. Diarrhea, unspecified type  Seney for stool studies she is encouraged to reschedule with gastroenterology.  - CULTURE STOOL; Future  - CDIFF BY PCR; Future    3. Pain of toe of right foot  Toes are diffusely mildly pink at the end is no signs of infection or drainage manage expectantly.      "

## 2018-04-11 LAB — C DIFF TOX A+B STL QL IA: NEGATIVE

## 2018-04-13 LAB
COMMENT  998281: NORMAL
DEAR DOCTOR  998282: NORMAL

## 2018-04-16 ENCOUNTER — PATIENT MESSAGE (OUTPATIENT)
Dept: HEALTH INFORMATION MANAGEMENT | Facility: OTHER | Age: 28
End: 2018-04-16

## 2018-04-16 LAB
BACTERIA SPEC CULT: NORMAL
BACTERIA SPEC CULT: NORMAL
CAMPYLOBACTER STL CULT: NORMAL
E COLI SXT STL QL IA: NEGATIVE
SALM + SHIG STL CULT: NORMAL

## 2018-04-18 ENCOUNTER — OFFICE VISIT (OUTPATIENT)
Dept: MEDICAL GROUP | Facility: MEDICAL CENTER | Age: 28
End: 2018-04-18
Payer: MEDICARE

## 2018-04-18 VITALS
OXYGEN SATURATION: 96 % | TEMPERATURE: 98.1 F | BODY MASS INDEX: 44.45 KG/M2 | RESPIRATION RATE: 14 BRPM | DIASTOLIC BLOOD PRESSURE: 78 MMHG | HEIGHT: 65 IN | HEART RATE: 87 BPM | WEIGHT: 266.8 LBS | SYSTOLIC BLOOD PRESSURE: 120 MMHG

## 2018-04-18 DIAGNOSIS — R39.15 URINARY URGENCY: ICD-10-CM

## 2018-04-18 DIAGNOSIS — N89.8 VAGINAL ITCHING: ICD-10-CM

## 2018-04-18 DIAGNOSIS — N30.01 ACUTE CYSTITIS WITH HEMATURIA: ICD-10-CM

## 2018-04-18 LAB
APPEARANCE UR: CLEAR
BILIRUB UR STRIP-MCNC: NORMAL MG/DL
COLOR UR AUTO: NORMAL
GLUCOSE UR STRIP.AUTO-MCNC: NEGATIVE MG/DL
KETONES UR STRIP.AUTO-MCNC: NEGATIVE MG/DL
LEUKOCYTE ESTERASE UR QL STRIP.AUTO: NORMAL
NITRITE UR QL STRIP.AUTO: NEGATIVE
PH UR STRIP.AUTO: 6 [PH] (ref 5–8)
PROT UR QL STRIP: 30 MG/DL
RBC UR QL AUTO: NORMAL
SP GR UR STRIP.AUTO: 1.01
UROBILINOGEN UR STRIP-MCNC: 0.2 MG/DL

## 2018-04-18 PROCEDURE — 81002 URINALYSIS NONAUTO W/O SCOPE: CPT | Performed by: NURSE PRACTITIONER

## 2018-04-18 PROCEDURE — 99214 OFFICE O/P EST MOD 30 MIN: CPT | Performed by: NURSE PRACTITIONER

## 2018-04-18 RX ORDER — NITROFURANTOIN 25; 75 MG/1; MG/1
100 CAPSULE ORAL 2 TIMES DAILY
Qty: 10 CAP | Refills: 0 | Status: SHIPPED | OUTPATIENT
Start: 2018-04-18 | End: 2018-04-27

## 2018-04-18 RX ORDER — FLUCONAZOLE 150 MG/1
150 TABLET ORAL DAILY
Qty: 2 TAB | Refills: 0 | Status: SHIPPED | OUTPATIENT
Start: 2018-04-18 | End: 2018-06-11

## 2018-04-18 NOTE — PATIENT INSTRUCTIONS
Urinary Frequency  The number of times a normal person urinates depends upon how much liquid they take in and how much liquid they are losing. If the temperature is hot and there is high humidity, then the person will sweat more and usually breathe a little more frequently. These factors decrease the amount of frequency of urination that would be considered normal.  The amount you drink is easily determined, but the amount of fluid lost is sometimes more difficult to calculate.   Fluid is lost in two ways:  · Sensible fluid loss is usually measured by the amount of urine that you get rid of. Losses of fluid can also occur with diarrhea.  · Insensible fluid loss is more difficult to measure. It is caused by evaporation. Insensible loss of fluid occurs through breathing and sweating. It usually ranges from a little less than a quart to a little more than a quart of fluid a day.  In normal temperatures and activity levels, the average person may urinate 4 to 7 times in a 24-hour period. Needing to urinate more often than that could indicate a problem. If one urinates 4 to 7 times in 24 hours and has large volumes each time, that could indicate a different problem from one who urinates 4 to 7 times a day and has small volumes. The time of urinating is also important. Most urinating should be done during the waking hours. Getting up at night to urinate frequently can indicate some problems.  CAUSES   The bladder is the organ in your lower abdomen that holds urine. Like a balloon, it swells some as it fills up. Your nerves sense this and tell you it is time to head for the bathroom. There are a number of reasons that you might feel the need to urinate more often than usual. They include:  · Urinary tract infection. This is usually associated with other signs such as burning when you urinate.  · In men, problems with the prostate (a walnut-size gland that is located near the tube that carries urine out of your body). There  are two reasons why the prostate can cause an increased frequency of urination:  ¨ An enlarged prostate that does not let the bladder empty well. If the bladder only half empties when you urinate, then it only has half the capacity to fill before you have to urinate again.  ¨ The nerves in the bladder become more hypersensitive with an increased size of the prostate even if the bladder empties completely.  · Pregnancy.  · Obesity. Excess weight is more likely to cause a problem for women than for men.  · Bladder stones or other bladder problems.  · Caffeine.  · Alcohol.  · Medications. For example, drugs that help the body get rid of extra fluid (diuretics) increase urine production. Some other medicines must be taken with lots of fluids.  · Muscle or nerve weakness. This might be the result of a spinal cord injury, a stroke, multiple sclerosis, or Parkinson disease.  · Long-standing diabetes can decrease the sensation of the bladder. This loss of sensation makes it harder to sense the bladder needs to be emptied. Over a period of years, the bladder is stretched out by constant overfilling. This weakens the bladder muscles so that the bladder does not empty well and has less capacity to fill with new urine.  · Interstitial cystitis (also called painful bladder syndrome). This condition develops because the tissues that line the inside of the bladder are inflamed (inflammation is the body's way of reacting to injury or infection). It causes pain and frequent urination. It occurs in women more often than in men.  DIAGNOSIS   · To decide what might be causing your urinary frequency, your health care provider will probably:  ¨ Ask about symptoms you have noticed.  ¨ Ask about your overall health. This will include questions about any medications you are taking.  ¨ Do a physical examination.  · Order some tests. These might include:  ¨ A blood test to check for diabetes or other health issues that could be contributing  to the problem.  ¨ Urine testing. This could measure the flow of urine and the pressure on the bladder.  ¨ A test of your neurological system (the brain, spinal cord, and nerves). This is the system that senses the need to urinate.  ¨ A bladder test to check whether it is emptying completely when you urinate.  ¨ Cystoscopy. This test uses a thin tube with a tiny camera on it. It offers a look inside your urethra and bladder to see if there are problems.  ¨ Imaging tests. You might be given a contrast dye and then asked to urinate. X-rays are taken to see how your bladder is working.  TREATMENT   It is important for you to be evaluated to determine if the amount or frequency that you have is unusual or abnormal. If it is found to be abnormal, the cause should be determined and this can usually be found out easily. Depending upon the cause, treatment could include medication, stimulation of the nerves, or surgery.  There are not too many things that you can do as an individual to change your urinary frequency. It is important that you balance the amount of fluid intake needed to compensate for your activity and the temperature. Medical problems will be diagnosed and taken care of by your physician. There is no particular bladder training such as Kegel exercises that you can do to help urinary frequency. This is an exercise that is usually recommended for people who have leaking of urine when they laugh, cough, or sneeze.  HOME CARE INSTRUCTIONS   · Take any medications your health care provider prescribed or suggested. Follow the directions carefully.  · Practice any lifestyle changes that are recommended. These might include:  ¨ Drinking less fluid or drinking at different times of the day. If you need to urinate often during the night, for example, you may need to stop drinking fluids early in the evening.  ¨ Cutting down on caffeine or alcohol. They both can make you need to urinate more often than normal. Caffeine  is found in coffee, tea, and sodas.  ¨ Losing weight, if that is recommended.  · Keep a journal or a log. You might be asked to record how much you drink and when and where you feel the need to urinate. This will also help evaluate how well the treatment provided by your physician is working.  SEEK MEDICAL CARE IF:   · Your need to urinate often gets worse.  · You feel increased pain or irritation when you urinate.  · You notice blood in your urine.  · You have questions about any medications that your health care provider recommended.  · You notice blood, pus, or swelling at the site of any test or treatment procedure.  · You develop a fever of more than 100.5°F (38.1°C).  SEEK IMMEDIATE MEDICAL CARE IF:   You develop a fever of more than 102.0°F (38.9°C).  This information is not intended to replace advice given to you by your health care provider. Make sure you discuss any questions you have with your health care provider.  Document Released: 10/14/2010 Document Revised: 01/08/2016 Document Reviewed: 07/13/2016  [employer] Interactive Patient Education © 2017 [employer]  Vaginal Yeast infection, Adult  Vaginal yeast infection is a condition that causes soreness, swelling, and redness (inflammation) of the vagina. It also causes vaginal discharge. This is a common condition. Some women get this infection frequently.  What are the causes?  This condition is caused by a change in the normal balance of the yeast (candida) and bacteria that live in the vagina. This change causes an overgrowth of yeast, which causes the inflammation.  What increases the risk?  This condition is more likely to develop in:  · Women who take antibiotic medicines.  · Women who have diabetes.  · Women who take birth control pills.  · Women who are pregnant.  · Women who douche often.  · Women who have a weak defense (immune) system.  · Women who have been taking steroid medicines for a long time.  · Women who frequently wear tight  clothing.  What are the signs or symptoms?  Symptoms of this condition include:  · White, thick vaginal discharge.  · Swelling, itching, redness, and irritation of the vagina. The lips of the vagina (vulva) may be affected as well.  · Pain or a burning feeling while urinating.  · Pain during sex.  How is this diagnosed?  This condition is diagnosed with a medical history and physical exam. This will include a pelvic exam. Your health care provider will examine a sample of your vaginal discharge under a microscope. Your health care provider may send this sample for testing to confirm the diagnosis.  How is this treated?  This condition is treated with medicine. Medicines may be over-the-counter or prescription. You may be told to use one or more of the following:  · Medicine that is taken orally.  · Medicine that is applied as a cream.  · Medicine that is inserted directly into the vagina (suppository).  Follow these instructions at home:  · Take or apply over-the-counter and prescription medicines only as told by your health care provider.  · Do not have sex until your health care provider has approved. Tell your sex partner that you have a yeast infection. That person should go to his or her health care provider if he or she develops symptoms.  · Do not wear tight clothes, such as pantyhose or tight pants.  · Avoid using tampons until your health care provider approves.  · Eat more yogurt. This may help to keep your yeast infection from returning.  · Try taking a sitz bath to help with discomfort. This is a warm water bath that is taken while you are sitting down. The water should only come up to your hips and should cover your buttocks. Do this 3-4 times per day or as told by your health care provider.  · Do not douche.  · Wear breathable, cotton underwear.  · If you have diabetes, keep your blood sugar levels under control.  Contact a health care provider if:  · You have a fever.  · Your symptoms go away and then  return.  · Your symptoms do not get better with treatment.  · Your symptoms get worse.  · You have new symptoms.  · You develop blisters in or around your vagina.  · You have blood coming from your vagina and it is not your menstrual period.  · You develop pain in your abdomen.  This information is not intended to replace advice given to you by your health care provider. Make sure you discuss any questions you have with your health care provider.  Document Released: 09/27/2006 Document Revised: 05/31/2017 Document Reviewed: 06/20/2016  [employer]" Interactive Patient Education © 2017 [employer]

## 2018-04-18 NOTE — PROGRESS NOTES
"cc:  Urinary frequency, vaginal itching and discharge    Subjective:     HPI:     Kristin Balderrama is a 27 y.o. female here to discuss the evaluation and management of:    Urinary urgency  Patient states that for the last days she's been having some urinary urgency, no pain, no ordor, no blood in her urine. Patient states it does feel like her bladder is \"pushing into her \". Patient also states that she's noticed some \"strings\" in her urine. Patient also makes note that she does have some incontinence. Denies any flank pain, low back pain, low pelvic pain, fever, chills, nausea or vomiting or diarrhea.     Vaginal itching  Patient states that she has had vaginal itching, burning and discharge for the last week. Patient states that her discharge is yellow in color. Patient states that she recently finished some clindamycin for a breast infection.        ROS:  Denies any Headache, Blurred Vision, Confusion Chest pain,  Shortness of breath,  Abdominal pain, Changes of bowel or bladder, Lower ext edema, Fevers, Nights sweats, Weight Changes, Focal weakness or numbness.  All other systems are negative. Vaginal itching, discharge, urinary urgency        Current Outpatient Prescriptions:   •  fluconazole (DIFLUCAN) 150 MG tablet, Take 1 Tab by mouth every day. Take one tablet now then one in 72 hours., Disp: 2 Tab, Rfl: 0  •  nitrofurantoin monohydr macro (MACROBID) 100 MG Cap, Take 1 Cap by mouth 2 times a day., Disp: 10 Cap, Rfl: 0  •  promethazine (PHENERGAN) 25 MG Tab, Take 1 Tab by mouth every 6 hours as needed for Nausea/Vomiting., Disp: 30 Tab, Rfl: 6  •  ondansetron (ZOFRAN ODT) 4 MG TABLET DISPERSIBLE, Take 1 Tab by mouth every 8 hours as needed., Disp: 12 Tab, Rfl: 0  •  liraglutide (VICTOZA) 18 MG/3ML Solution Pen-injector injection, Inject 0.3 mL as instructed every day., Disp: 3 PEN, Rfl: 6  •  traZODone (DESYREL) 100 MG Tab, Take 1 Tab by mouth every bedtime., Disp: 90 Tab, Rfl: 3  •  busPIRone (BUSPAR) 5 " MG Tab, Take 1 Tab by mouth 2 times a day., Disp: 60 Tab, Rfl: 5  •  mupirocin (BACTROBAN) 2 % Ointment, Apply BID to infected sores x 1week, Disp: 30 g, Rfl: 0  •  nystatin/triamcinolone (MYCOLOG) 553725-2.1 UNIT/GM-% Cream, Apply TID to itchy area under breasts x 1week, Disp: 45 g, Rfl: 0  •  ivabradine (CORLANOR) 5 MG Tab tablet, Take 1 Tab by mouth 2 times a day, with meals., Disp: 60 Tab, Rfl: 11  •  baclofen (LIORESAL) 10 MG Tab, Take 1 Tab by mouth 3 times a day., Disp: 90 Tab, Rfl: 5  •  NON SPECIFIED, Bariatric Front wheel walker with seat and breaks., Disp: 1 Each, Rfl: 0  •  sodium bicarbonate (SODIUM BICARBONATE) 650 MG Tab, Take 650 mg by mouth 3 times a day., Disp: , Rfl:   •  non-formulary med, Take 15 mL by mouth every 4 hours. CBD Oil/Sugar - every 4 hours for chornic pain, Disp: , Rfl:   •  fluoxetine (PROZAC) 10 MG Cap, TAKE ONE CAPSULE BY MOUTH ONCE A DAY, Disp: 30 Cap, Rfl: 11  •  ziprasidone (GEODON) 80 MG Cap, TAKE 2 CAPSULES BY MOUTH NIGHTLY AT BEDTIME, Disp: 60 Cap, Rfl: 11  •  fesoterodine fumarate (TOVIAZ) 4 MG TABLET SR 24 HR, Take 1 Tab by mouth every day., Disp: 90 Tab, Rfl: 3  •  albuterol 108 (90 Base) MCG/ACT Aero Soln inhalation aerosol, Inhale 2 Puffs by mouth every 6 hours as needed for Shortness of Breath., Disp: 8.5 g, Rfl: 1  •  gabapentin (NEURONTIN) 600 MG tablet, Take 1 Tab by mouth 3 times a day., Disp: 270 Tab, Rfl: 3  •  lactulose 10 GM/15ML Solution, Take 10 g by mouth 2 times a day as needed (For constipation)., Disp: , Rfl:   •  aspirin EC (ECOTRIN) 81 MG Tablet Delayed Response, Take 1 Tab by mouth every day., Disp: 30 Tab, Rfl: 6  •  Melatonin 5 MG Tab, Take 10 mg by mouth every bedtime., Disp: , Rfl:     Allergies   Allergen Reactions   • Cefdinir Shortness of Breath and Itching     Tolerated 1/18/17   • Depakote [Divalproex Sodium] Unspecified     Muscle spasms/muscle pain and weakness     • Abilify Unspecified     Headaches/muscle twitching     • Amitriptyline  "Unspecified     Headaches     • Amoxicillin Rash     Pt states \"I get a rash\".     • Ciprofloxacin Rash     Pt states \"I get a rash\".     • Clindamycin Nausea     Even with food     • Doxycycline Vomiting and Nausea     RXN=unknown   • Ees [Erythromycin] Vomiting and Nausea   • Flagyl [Metronidazole Hcl] Unspecified     \"eye problems\"     • Flomax [Tamsulosin Hydrochloride] Swelling   • Metformin Unspecified     Increased lactic acid      • Sulfa Drugs Hives and Rash     RXN=since childhood   • Tape Rash     Tears skin off   coban with Tegaderm tape ok  RXN=ongoing   • Vancomycin Itching     Pt becomes flushed in face and chest.   RXN=7/10/16   • Wound Dressing Adhesive Hives     By pt report   • Cephalexin [Keflex] Rash     Pt states she gets a rash with this medication   • Erythromycin Rash     .   • Levofloxacin Unspecified     Leg muscle cramps   • Metronidazole Rash     .   • Valproic Acid Rash     .       Past Medical History:   Diagnosis Date   • Anginal syndrome     random chest pain especially with tachycardia   • Arrhythmia     \"sinus tachycardia\", cariologist, Dr. Kumar; ablation 2/2016   • Arthritis     osteo   • ASTHMA     when around smoke   • Borderline personality disorder    • Breath shortness     with tachycardia   • Chronic UTI 9/18/2010   • Disorder of thyroid    • Fall    • Gynecological disorder     PCOS   • Headache(784.0)    • Heart burn    • History of falling    • Hypertension    • Migraine    • 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CMS-HCC)    • Multiple personality disorder    • Obesity    • Pain 08-15-12    back, 7/10   • PCOS (polycystic ovarian syndrome)    • Pneumonia 2012   • Psychosis    • Renal disorder     \"kidney disease, stage 1\" nephrologist, Dr. 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   • Urinary bladder disorder     Suprapubic cath   • Urinary " incontinence      Past Surgical History:   Procedure Laterality Date   • MUSCLE BIOPSY Right 1/26/2017    Procedure: MUSCLE BIOPSY - THIGH;  Surgeon: Isidro Vigil M.D.;  Location: SURGERY [facility];  Service:    • GASTROSCOPY WITH BALLOON DILATATION N/A 1/4/2017    Procedure: GASTROSCOPY WITH DILATATION;  Surgeon: Torres Vargas M.D.;  Location: SURGERY [facility];  Service:    • BOWEL STIMULATOR INSERTION  7/13/2016    Procedure: BOWEL STIMULATOR INSERTION FOR PERMANENT INTERSTIM SACRAL IMPLANT;  Surgeon: Joe Noyola M.D.;  Location: SURGERY [facility];  Service:    • RECOVERY  1/27/2016    Procedure: CATH LAB EP STUDY WITH SINUS NODE MODIFICATION LA;  Surgeon: Recoveryonly Surgery;  [place] [facility] [place]  Service:    • OTHER CARDIAC SURGERY  1/2016    cardiac ablation   • NEURO DEST FACET L/S W/IG SNGL  4/21/2015    Performed by Reza Tabor at SURGERY SURG[facility]   • LUMBAR FUSION ANTERIOR  8/21/2012    Performed by SUSIE SAWANT at SURGERY [facility]   • ALYSSA BY LAPAROSCOPY  8/29/2010    Performed by SHAYY JOHNS at SURGERY [facility] ORS   • OTHER ABDOMINAL SURGERY     • TONSILLECTOMY      tonsillectomy     Family History   Problem Relation Age of Onset   • Hypertension Mother    • Sleep Apnea Mother    • Heart Disease Mother    • Other Mother      hypothryod   • Hypertension Maternal Uncle    • Heart Disease Maternal Grandmother    • Hypertension Maternal Grandmother    • No Known Problems Sister    • Other Sister      Narcolepsy;fibromyalsia;bone;nerve   • Genitourinary () Sister      endometriosis     Social History     Social History   • Marital status: Single     Spouse name: N/A   • Number of children: N/A   • Years of education: N/A     Occupational History   • Not on file.     Social History Main Topics   • Smoking status: Never Smoker   • Smokeless tobacco: Never Used   • Alcohol use No   • Drug use: Yes     Types: Marijuana   • Sexual  "activity: Not Currently     Birth control/ protection: Pill     Other Topics Concern   • Not on file     Social History Narrative    ** Merged History Encounter **            Objective:     Vitals: /78   Pulse 87   Temp 36.7 °C (98.1 °F)   Resp 14   Ht 1.651 m (5' 5\")   Wt 121 kg (266 lb 12.8 oz)   SpO2 96%   BMI 44.40 kg/m²    General: Alert, pleasant, NAD  HEENT: Normocephalic.  Heart: Regular rate and rhythm.  S1 and S2 normal.  No murmurs appreciated.  Respiratory: Normal respiratory effort.  Clear to auscultation bilaterally.  Abdomen: No CVA tenderness  Skin: Warm, dry, no rashes.  Musculoskeletal: Gait is with a walker. Moves all extremities well.  Neurological: No tremors, sensation grossly intact  nus, rapid movements normal, finger-to-nose intact, CN2-12 intact  Psych:  Affect/mood is normal, judgement is good, memory is intact, grooming is appropriate.    Assessment/Plan:     Kristin was seen today for uti.    Diagnoses and all orders for this visit:    Acute cystitis with hematuria  Urinary urgency  Point-of-care urine done in clinic and showed a small leukocytes and trace blood, negative nitrates. Patient states that Macrobid is helpful in the past for her when she has had UTIs. Patient does have a long history of allergies to medications. Handout provided on UTI prevention. Discussed with patient she can start Macrobid.  -     nitrofurantoin monohydr macro (MACROBID) 100 MG Cap; Take 1 Cap by mouth 2 times a day.  -     POCT Urinalysis    Vaginal itching  Positive for vaginal itching, discharge, burning sensation on her vagina. Patient denies wanting to do the vaginal pathogens tests today. Patient states that she is following up with her GYN tomorrow and states that she will do the test tomorrow. Suspicious for a yeast infection as she did just come off a course of clindamycin.  -     fluconazole (DIFLUCAN) 150 MG tablet; Take 1 Tab by mouth every day. Take one tablet now then one in 72 " hours.           Health care Maintenance:     Return if symptoms worsen or fail to improve.    I have placed the above orders and discussed them with an approved delegating provider.  The MA is performing the below orders under the direction of Dr. Ronn CARO

## 2018-04-26 DIAGNOSIS — E11.69 DIABETES MELLITUS TYPE 2 IN OBESE (HCC): ICD-10-CM

## 2018-04-26 DIAGNOSIS — E66.9 DIABETES MELLITUS TYPE 2 IN OBESE (HCC): ICD-10-CM

## 2018-04-27 ENCOUNTER — OFFICE VISIT (OUTPATIENT)
Dept: MEDICAL GROUP | Facility: MEDICAL CENTER | Age: 28
End: 2018-04-27
Payer: MEDICARE

## 2018-04-27 VITALS
TEMPERATURE: 100.5 F | HEIGHT: 65 IN | WEIGHT: 279 LBS | SYSTOLIC BLOOD PRESSURE: 116 MMHG | RESPIRATION RATE: 16 BRPM | DIASTOLIC BLOOD PRESSURE: 70 MMHG | OXYGEN SATURATION: 95 % | HEART RATE: 86 BPM | BODY MASS INDEX: 46.48 KG/M2

## 2018-04-27 DIAGNOSIS — R05.9 COUGH: ICD-10-CM

## 2018-04-27 DIAGNOSIS — R11.0 NAUSEA: ICD-10-CM

## 2018-04-27 DIAGNOSIS — R30.0 DYSURIA: ICD-10-CM

## 2018-04-27 LAB
APPEARANCE UR: CLEAR
BILIRUB UR STRIP-MCNC: ABNORMAL MG/DL
COLOR UR AUTO: ABNORMAL
GLUCOSE UR STRIP.AUTO-MCNC: ABNORMAL MG/DL
KETONES UR STRIP.AUTO-MCNC: ABNORMAL MG/DL
LEUKOCYTE ESTERASE UR QL STRIP.AUTO: ABNORMAL
NITRITE UR QL STRIP.AUTO: ABNORMAL
PH UR STRIP.AUTO: 8.5 [PH] (ref 5–8)
PROT UR QL STRIP: ABNORMAL MG/DL
RBC UR QL AUTO: ABNORMAL
SP GR UR STRIP.AUTO: 1.02
UROBILINOGEN UR STRIP-MCNC: 0.2 MG/DL

## 2018-04-27 PROCEDURE — 99213 OFFICE O/P EST LOW 20 MIN: CPT | Performed by: INTERNAL MEDICINE

## 2018-04-27 PROCEDURE — 81002 URINALYSIS NONAUTO W/O SCOPE: CPT | Performed by: INTERNAL MEDICINE

## 2018-04-27 RX ORDER — PROMETHAZINE HYDROCHLORIDE 25 MG/1
25 SUPPOSITORY RECTAL EVERY 6 HOURS PRN
Qty: 10 SUPPOSITORY | Refills: 0 | Status: SHIPPED | OUTPATIENT
Start: 2018-04-27 | End: 2018-09-07

## 2018-04-27 NOTE — PROGRESS NOTES
CC: Cough, dysuria    HPI:   Kristin presents today with the following.    1. Cough  Reporting a cough for the past 3 days.  She reports low-grade fevers.  No earaches she does have a sore mouth because of a recent wisdom tooth extraction.  She is taking pain medications currently.    2. Dysuria  Recently treated for a yeast infection as well as UTI with Macrobid and Diflucan.  Has finished both courses of treatment and still has some pain with urination.    3. Nausea  She is complaining of nausea has thrown up twice today she is taking pain medications for her mouth.      Patient Active Problem List    Diagnosis Date Noted   • Sinus tachycardia 10/31/2013     Priority: High   • Chronic inflammatory arthritis 05/23/2016     Priority: Medium   • Depression 10/28/2016     Priority: Low   • Schizophrenia (CMS-HCC) 10/27/2016     Priority: Low   • Psychosis, schizophrenia, simple (formerly [facility]) 09/29/2016     Priority: Low     Class: Chronic   • Acquired hypothyroidism 11/23/2015     Priority: Low   • PCOS (polycystic ovarian syndrome) 11/23/2015     Priority: Low   • Progressive focal motor weakness 06/28/2015     Priority: Low   • Fatty liver disease, nonalcoholic 01/19/2015     Priority: Low   • Anxiety 12/16/2014     Priority: Low   • Knee pain, right 02/13/2014     Priority: Low   • Neurogenic bladder 04/02/2011     Priority: Low   • Chronic UTI 09/18/2010     Priority: Low   • Borderline personality disorder in adult 09/18/2010     Priority: Low   • TONYA (obstructive sleep apnea) 01/09/2018   • Functional diarrhea 01/05/2018   • Breast wound 11/06/2017   • Morbid obesity with BMI of 45.0-49.9, adult (formerly [facility]) 10/24/2017   • Intractable episodic cluster headache 09/14/2017   • Rash 06/09/2017   • Hashimoto's encephalopathy 05/17/2017   • Fall at home 05/13/2017   • Type 2 diabetes mellitus without complication, without long-term current use of insulin (CMS-HCC) 04/26/2017   • On home oxygen therapy 04/15/2017   • Chest pain  03/30/2017   • Weakness of right upper extremity 02/23/2017   • Chronic suprapubic catheter (CMS-HCC) 02/16/2017   • Hypovitaminosis D 11/29/2016   • HTN (hypertension) 11/01/2016   • Chronic pain syndrome 10/27/2016   • Bowel and bladder incontinence 10/27/2016   • Galactorrhea 07/22/2016   • Elevated sedimentation rate 06/27/2016   • Weakness of both lower extremities 06/22/2016   • Vitamin D deficiency 05/21/2016   • Morbidly obese (CMS-HCC) 03/07/2016   • Scoliosis 03/07/2016   • GERD (gastroesophageal reflux disease) 03/07/2016   • Peripheral neuropathy (CMS-HCC) 03/06/2016   • H/O prior ablation treatment 02/10/2016       Current Outpatient Prescriptions   Medication Sig Dispense Refill   • promethazine (PHENERGAN) 25 MG Suppos Insert 1 Suppository in rectum every 6 hours as needed for Nausea/Vomiting. 10 Suppository 0   • Blood Glucose Monitoring Suppl Supplies Misc Test strips order: Test strips for accucheck meter. Sig: use qday 50 Each 11   • fluconazole (DIFLUCAN) 150 MG tablet Take 1 Tab by mouth every day. Take one tablet now then one in 72 hours. 2 Tab 0   • promethazine (PHENERGAN) 25 MG Tab Take 1 Tab by mouth every 6 hours as needed for Nausea/Vomiting. 30 Tab 6   • ondansetron (ZOFRAN ODT) 4 MG TABLET DISPERSIBLE Take 1 Tab by mouth every 8 hours as needed. 12 Tab 0   • liraglutide (VICTOZA) 18 MG/3ML Solution Pen-injector injection Inject 0.3 mL as instructed every day. 3 PEN 6   • traZODone (DESYREL) 100 MG Tab Take 1 Tab by mouth every bedtime. 90 Tab 3   • busPIRone (BUSPAR) 5 MG Tab Take 1 Tab by mouth 2 times a day. 60 Tab 5   • mupirocin (BACTROBAN) 2 % Ointment Apply BID to infected sores x 1week 30 g 0   • nystatin/triamcinolone (MYCOLOG) 230667-6.1 UNIT/GM-% Cream Apply TID to itchy area under breasts x 1week 45 g 0   • ivabradine (CORLANOR) 5 MG Tab tablet Take 1 Tab by mouth 2 times a day, with meals. 60 Tab 11   • baclofen (LIORESAL) 10 MG Tab Take 1 Tab by mouth 3 times a day. 90 Tab  5   • NON SPECIFIED Bariatric Front wheel walker with seat and breaks. 1 Each 0   • sodium bicarbonate (SODIUM BICARBONATE) 650 MG Tab Take 650 mg by mouth 3 times a day.     • non-formulary med Take 15 mL by mouth every 4 hours. CBD Oil/Sugar - every 4 hours for chornic pain     • fluoxetine (PROZAC) 10 MG Cap TAKE ONE CAPSULE BY MOUTH ONCE A DAY 30 Cap 11   • ziprasidone (GEODON) 80 MG Cap TAKE 2 CAPSULES BY MOUTH NIGHTLY AT BEDTIME 60 Cap 11   • fesoterodine fumarate (TOVIAZ) 4 MG TABLET SR 24 HR Take 1 Tab by mouth every day. 90 Tab 3   • albuterol 108 (90 Base) MCG/ACT Aero Soln inhalation aerosol Inhale 2 Puffs by mouth every 6 hours as needed for Shortness of Breath. 8.5 g 1   • gabapentin (NEURONTIN) 600 MG tablet Take 1 Tab by mouth 3 times a day. 270 Tab 3   • lactulose 10 GM/15ML Solution Take 10 g by mouth 2 times a day as needed (For constipation).     • aspirin EC (ECOTRIN) 81 MG Tablet Delayed Response Take 1 Tab by mouth every day. 30 Tab 6   • Melatonin 5 MG Tab Take 10 mg by mouth every bedtime.       No current facility-administered medications for this visit.          Allergies as of 04/27/2018 - Reviewed 04/18/2018   Allergen Reaction Noted   • Cefdinir Shortness of Breath and Itching 03/01/2016   • Depakote [divalproex sodium] Unspecified 06/14/2010   • Abilify Unspecified 01/17/2013   • Amitriptyline Unspecified 10/31/2013   • Amoxicillin Rash 09/18/2010   • Ciprofloxacin Rash 12/17/2009   • Clindamycin Nausea 02/02/2011   • Doxycycline Vomiting and Nausea 08/15/2012   • Ees [erythromycin] Vomiting and Nausea 08/28/2010   • Flagyl [metronidazole hcl] Unspecified 03/31/2011   • Flomax [tamsulosin hydrochloride] Swelling 09/24/2009   • Metformin Unspecified 07/23/2013   • Sulfa drugs Hives and Rash 09/18/2010   • Tape Rash 08/15/2012   • Vancomycin Itching 07/10/2016   • Wound dressing adhesive Hives 01/12/2018   • Cephalexin [keflex] Rash 01/01/2017   • Erythromycin Rash 03/30/2017   •  Levofloxacin Unspecified 10/27/2016   • Metronidazole Rash 03/30/2017   • Valproic acid Rash 03/30/2017        ROS: Denies Chest pain, SOB, LE edema.    /82   Pulse (!) 52   Temp 37.8 °C (100 °F)   SpO2 (!) 14%     Physical Exam:  Gen:         Alert and oriented, No apparent distress.  Neck:        No Lymphadenopathy or Bruits.  Lungs:     Clear to auscultation bilaterally  CV:          Regular rate and rhythm. No murmurs, rubs or gallops.               Ext:          No clubbing, cyanosis, edema.      Assessment and Plan.   27 y.o. female with the following issues.    1. Cough  No obvious abnormalities on exam possible viral infection no antibiotics at this point.    2. Dysuria  Repeat urinalysis shows completely normal have recommended over-the-counter Azo.  - POCT Urinalysis    3. Nausea  Have given suppositories.  - promethazine (PHENERGAN) 25 MG Suppos; Insert 1 Suppository in rectum every 6 hours as needed for Nausea/Vomiting.  Dispense: 10 Suppository; Refill: 0

## 2018-05-17 ENCOUNTER — APPOINTMENT (OUTPATIENT)
Dept: MEDICAL GROUP | Facility: MEDICAL CENTER | Age: 28
End: 2018-05-17
Payer: MEDICARE

## 2018-05-24 ENCOUNTER — OFFICE VISIT (OUTPATIENT)
Dept: URGENT CARE | Facility: CLINIC | Age: 28
End: 2018-05-24
Payer: MEDICARE

## 2018-05-24 VITALS
OXYGEN SATURATION: 98 % | BODY MASS INDEX: 46.48 KG/M2 | TEMPERATURE: 97.9 F | WEIGHT: 279 LBS | HEIGHT: 65 IN | HEART RATE: 76 BPM | RESPIRATION RATE: 19 BRPM | SYSTOLIC BLOOD PRESSURE: 112 MMHG | DIASTOLIC BLOOD PRESSURE: 78 MMHG

## 2018-05-24 DIAGNOSIS — L03.313 CELLULITIS OF CHEST WALL: Primary | ICD-10-CM

## 2018-05-24 PROCEDURE — 99214 OFFICE O/P EST MOD 30 MIN: CPT | Performed by: PHYSICIAN ASSISTANT

## 2018-05-24 RX ORDER — CLINDAMYCIN HYDROCHLORIDE 300 MG/1
300 CAPSULE ORAL 3 TIMES DAILY
Qty: 30 CAP | Refills: 0 | Status: SHIPPED | OUTPATIENT
Start: 2018-05-24 | End: 2018-07-14

## 2018-05-24 NOTE — PROGRESS NOTES
"Subjective:   Pt is a 27 y.o. female who presents with Sore ((L) breast sore, popped open x 1 week)            HPI  Pt notes manipulated papules on left breast and caused redness, swelling and infection x 7 days. Pt has not taken any Rx medications for this condition. Pt states the pain is a 3/10, aching in nature and worse at night. Pt denies CP, SOB, NVD, paresthesias, headaches, dizziness, change in vision, hives, or other joint pain. The pt's medication list, problem list, and allergies have been evaluated and reviewed during today's visit.    PMH:  Past Medical History:   Diagnosis Date   • Anginal syndrome     random chest pain especially with tachycardia   • Arrhythmia     \"sinus tachycardia\", cariologist, Dr. Kumar; ablation 2/2016   • Arthritis     osteo   • ASTHMA     when around smoke   • Borderline personality disorder    • Breath shortness     with tachycardia   • Chronic UTI 9/18/2010   • Disorder of thyroid    • Fall    • Gynecological disorder     PCOS   • Headache(784.0)    • Heart burn    • History of falling    • Hypertension    • Migraine    • 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HCC)    • Multiple personality disorder    • Obesity    • Pain 08-15-12    back, 7/10   • PCOS (polycystic ovarian syndrome)    • Pneumonia 2012   • Psychosis    • Renal disorder     \"kidney disease, stage 1\" nephrologist, Dr. 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   • Urinary bladder disorder     Suprapubic cath   • Urinary incontinence        PSH:  Past Surgical History:   Procedure Laterality Date   • MUSCLE BIOPSY Right 1/26/2017    Procedure: MUSCLE BIOPSY - THIGH;  Surgeon: Isidro Vigil M.D.;  Location: SURGERY [facility];  Service:    • GASTROSCOPY WITH BALLOON DILATATION N/A 1/4/2017    Procedure: GASTROSCOPY WITH DILATATION;  Surgeon: Torres Vargas M.D.;  Location: SURGERY " [facility];  Service:    • BOWEL STIMULATOR INSERTION  7/13/2016    Procedure: BOWEL STIMULATOR INSERTION FOR PERMANENT INTERSTIM SACRAL IMPLANT;  Surgeon: Joe Noyola M.D.;  Location: SURGERY [facility];  Service:    • RECOVERY  1/27/2016    Procedure: CATH LAB EP STUDY WITH SINUS NODE MODIFICATION ABHINAV;  Surgeon: [facility];  [place] [facility]  Service:    • OTHER CARDIAC SURGERY  1/2016    cardiac ablation   • NEURO DEST FACET L/S W/IG SNGL  4/21/2015    Performed by Reza Tabor at SURGERY [facility]   • LUMBAR FUSION ANTERIOR  8/21/2012    Performed by SUSIE SAWANT at SURGERY [facility]S   • ALYSSA BY LAPAROSCOPY  8/29/2010    Performed by SHAYY JOHNS at SURGERY [facility]   • OTHER ABDOMINAL SURGERY     • TONSILLECTOMY      tonsillectomy       Fam Hx:    family history includes Genitourinary () in her sister; Heart Disease in her maternal grandmother and mother; Hypertension in her maternal grandmother, maternal uncle, and mother; No Known Problems in her sister; Other in her mother and sister; Sleep Apnea in her mother.  Family Status   Relation Status   • Mother Alive    44 2016   • Maternal Uncle Alive   • Maternal Grandmother Alive   • Father Alive    bio father hx unknown   • Sister Alive   • Sister Alive       Soc HX:  Social History     Social History   • Marital status: Single     Spouse name: N/A   • Number of children: N/A   • Years of education: N/A     Occupational History   • Not on file.     Social History Main Topics   • Smoking status: Never Smoker   • Smokeless tobacco: Never Used   • Alcohol use No   • Drug use: Yes     Types: Marijuana      Comment: edibles   • Sexual activity: Not Currently     Birth control/ protection: Pill     Other Topics Concern   • Not on file     Social History Narrative    ** Merged History Encounter **              Medications:    Current Outpatient Prescriptions:   •  clindamycin (CLEOCIN)  300 MG Cap, Take 1 Cap by mouth 3 times a day., Disp: 30 Cap, Rfl: 0  •  promethazine (PHENERGAN) 25 MG Suppos, Insert 1 Suppository in rectum every 6 hours as needed for Nausea/Vomiting., Disp: 10 Suppository, Rfl: 0  •  Blood Glucose Monitoring Suppl Supplies Misc, Test strips order: Test strips for accucheck meter. Sig: use qday, Disp: 50 Each, Rfl: 11  •  fluconazole (DIFLUCAN) 150 MG tablet, Take 1 Tab by mouth every day. Take one tablet now then one in 72 hours., Disp: 2 Tab, Rfl: 0  •  promethazine (PHENERGAN) 25 MG Tab, Take 1 Tab by mouth every 6 hours as needed for Nausea/Vomiting., Disp: 30 Tab, Rfl: 6  •  ondansetron (ZOFRAN ODT) 4 MG TABLET DISPERSIBLE, Take 1 Tab by mouth every 8 hours as needed., Disp: 12 Tab, Rfl: 0  •  liraglutide (VICTOZA) 18 MG/3ML Solution Pen-injector injection, Inject 0.3 mL as instructed every day., Disp: 3 PEN, Rfl: 6  •  traZODone (DESYREL) 100 MG Tab, Take 1 Tab by mouth every bedtime., Disp: 90 Tab, Rfl: 3  •  busPIRone (BUSPAR) 5 MG Tab, Take 1 Tab by mouth 2 times a day., Disp: 60 Tab, Rfl: 5  •  mupirocin (BACTROBAN) 2 % Ointment, Apply BID to infected sores x 1week, Disp: 30 g, Rfl: 0  •  nystatin/triamcinolone (MYCOLOG) 656875-0.1 UNIT/GM-% Cream, Apply TID to itchy area under breasts x 1week, Disp: 45 g, Rfl: 0  •  ivabradine (CORLANOR) 5 MG Tab tablet, Take 1 Tab by mouth 2 times a day, with meals., Disp: 60 Tab, Rfl: 11  •  baclofen (LIORESAL) 10 MG Tab, Take 1 Tab by mouth 3 times a day., Disp: 90 Tab, Rfl: 5  •  sodium bicarbonate (SODIUM BICARBONATE) 650 MG Tab, Take 650 mg by mouth 3 times a day., Disp: , Rfl:   •  non-formulary med, Take 15 mL by mouth every 4 hours. CBD Oil/Sugar - every 4 hours for chornic pain, Disp: , Rfl:   •  fluoxetine (PROZAC) 10 MG Cap, TAKE ONE CAPSULE BY MOUTH ONCE A DAY, Disp: 30 Cap, Rfl: 11  •  ziprasidone (GEODON) 80 MG Cap, TAKE 2 CAPSULES BY MOUTH NIGHTLY AT BEDTIME, Disp: 60 Cap, Rfl: 11  •  fesoterodine fumarate (TOVIAZ) 4  MG TABLET SR 24 HR, Take 1 Tab by mouth every day., Disp: 90 Tab, Rfl: 3  •  gabapentin (NEURONTIN) 600 MG tablet, Take 1 Tab by mouth 3 times a day., Disp: 270 Tab, Rfl: 3  •  lactulose 10 GM/15ML Solution, Take 10 g by mouth 2 times a day as needed (For constipation)., Disp: , Rfl:   •  aspirin EC (ECOTRIN) 81 MG Tablet Delayed Response, Take 1 Tab by mouth every day., Disp: 30 Tab, Rfl: 6  •  Melatonin 5 MG Tab, Take 10 mg by mouth every bedtime., Disp: , Rfl:   •  NON SPECIFIED, Bariatric Front wheel walker with seat and breaks., Disp: 1 Each, Rfl: 0  •  albuterol 108 (90 Base) MCG/ACT Aero Soln inhalation aerosol, Inhale 2 Puffs by mouth every 6 hours as needed for Shortness of Breath., Disp: 8.5 g, Rfl: 1      Allergies:  Cefdinir; Depakote [divalproex sodium]; Doxycycline; Abilify; Amitriptyline; Amoxicillin; Ciprofloxacin; Clindamycin; Ees [erythromycin]; Flagyl [metronidazole hcl]; Flomax [tamsulosin hydrochloride]; Metformin; Sulfa drugs; Tape; Vancomycin; Wound dressing adhesive; Cephalexin [keflex]; Erythromycin; Levofloxacin; Metronidazole; and Valproic acid    ROS  Constitutional: Negative for fever, chills and malaise/fatigue.   HENT: Negative for congestion and sore throat.    Eyes: Negative for blurred vision, double vision and photophobia.   Respiratory: Negative for cough and shortness of breath.    Cardiovascular: Negative for chest pain and palpitations.   Gastrointestinal: Negative for heartburn, nausea, vomiting, abdominal pain, diarrhea and constipation.   Genitourinary: Negative for dysuria and flank pain.   Musculoskeletal: Negative for joint pain and myalgias.   Skin: +for infected papules on left breast  Neurological: Negative for dizziness, tingling and headaches.   Endo/Heme/Allergies: Does not bruise/bleed easily.   Psychiatric/Behavioral: Negative for depression. The patient is not nervous/anxious.           Objective:     /78   Pulse 76   Temp 36.6 °C (97.9 °F)   Resp 19    "Ht 1.651 m (5' 5\")   Wt (!) 126.6 kg (279 lb)   SpO2 98%   Breastfeeding? No   BMI 46.43 kg/m²      Physical Exam   Skin: Skin is warm. Capillary refill takes less than 2 seconds. There is erythema.              Constitutional: PT is oriented to person, place, and time. PT appears well-developed and well-nourished. No distress.   HENT:   Head: Normocephalic and atraumatic.   Mouth/Throat: Oropharynx is clear and moist. No oropharyngeal exudate.   Eyes: Conjunctivae normal and EOM are normal. Pupils are equal, round, and reactive to light.   Neck: Normal range of motion. Neck supple. No thyromegaly present.   Cardiovascular: Normal rate, regular rhythm, normal heart sounds and intact distal pulses.  Exam reveals no gallop and no friction rub.    No murmur heard.  Pulmonary/Chest: Effort normal and breath sounds normal. No respiratory distress. PT has no wheezes. PT has no rales. Pt exhibits no tenderness.   Abdominal: Soft. Bowel sounds are normal. PT exhibits no distension and no mass. There is no tenderness. There is no rebound and no guarding.   Musculoskeletal: Normal range of motion. PT exhibits no edema and no tenderness.   Neurological: PT is alert and oriented to person, place, and time. PT has normal reflexes. No cranial nerve deficit.       Psychiatric: PT has a normal mood and affect. PT behavior is normal. Judgment and thought content normal.          Assessment/Plan:     1. Cellulitis of chest wall    - clindamycin (CLEOCIN) 300 MG Cap; Take 1 Cap by mouth 3 times a day.  Dispense: 30 Cap; Refill: 0    Pt allergic to many abx therapies and will take phenergan 30 mins prior to taking Clindamycin which only causes her nausea as a side effect  Rest, fluids encouraged.  AVS with medical info given.  Pt was in full understanding and agreement with the plan.  Follow-up as needed if symptoms worsen or fail to improve.    "

## 2018-06-05 ENCOUNTER — APPOINTMENT (OUTPATIENT)
Dept: BEHAVIORAL HEALTH | Facility: PHYSICIAN GROUP | Age: 28
End: 2018-06-05
Payer: MEDICARE

## 2018-06-11 ENCOUNTER — OFFICE VISIT (OUTPATIENT)
Dept: MEDICAL GROUP | Facility: MEDICAL CENTER | Age: 28
End: 2018-06-11
Payer: MEDICARE

## 2018-06-11 VITALS
BODY MASS INDEX: 44.93 KG/M2 | WEIGHT: 270 LBS | DIASTOLIC BLOOD PRESSURE: 76 MMHG | SYSTOLIC BLOOD PRESSURE: 124 MMHG | HEART RATE: 78 BPM | TEMPERATURE: 98.6 F | RESPIRATION RATE: 16 BRPM | OXYGEN SATURATION: 98 %

## 2018-06-11 DIAGNOSIS — M25.562 POSTERIOR LEFT KNEE PAIN: ICD-10-CM

## 2018-06-11 DIAGNOSIS — M25.561 POSTERIOR RIGHT KNEE PAIN: ICD-10-CM

## 2018-06-11 DIAGNOSIS — E66.01 MORBID OBESITY WITH BMI OF 45.0-49.9, ADULT (HCC): ICD-10-CM

## 2018-06-11 PROCEDURE — 99213 OFFICE O/P EST LOW 20 MIN: CPT | Performed by: FAMILY MEDICINE

## 2018-06-11 NOTE — PROGRESS NOTES
cc:  Knee pain    Subjective:     Kristin Balderrama is a 27 y.o. female presenting to discuss knee pain. She describes a sensation of a ball behind her knees bilaterally and some swelling. Left is worse than the right. This started about a week ago, seems to be getting worse. Associated with nothing, denies any numbness, tingling, redness, warmth. Denies any long car rides or use of immobility. Symptoms are worse when she bends the knee. There was nothing. Has tried nothing except elevating her legs.    Review of systems:  See above.       Current Outpatient Prescriptions:   •  clindamycin (CLEOCIN) 300 MG Cap, Take 1 Cap by mouth 3 times a day., Disp: 30 Cap, Rfl: 0  •  promethazine (PHENERGAN) 25 MG Suppos, Insert 1 Suppository in rectum every 6 hours as needed for Nausea/Vomiting., Disp: 10 Suppository, Rfl: 0  •  Blood Glucose Monitoring Suppl Supplies Misc, Test strips order: Test strips for accucheck meter. Sig: use qday, Disp: 50 Each, Rfl: 11  •  promethazine (PHENERGAN) 25 MG Tab, Take 1 Tab by mouth every 6 hours as needed for Nausea/Vomiting., Disp: 30 Tab, Rfl: 6  •  ondansetron (ZOFRAN ODT) 4 MG TABLET DISPERSIBLE, Take 1 Tab by mouth every 8 hours as needed., Disp: 12 Tab, Rfl: 0  •  liraglutide (VICTOZA) 18 MG/3ML Solution Pen-injector injection, Inject 0.3 mL as instructed every day., Disp: 3 PEN, Rfl: 6  •  traZODone (DESYREL) 100 MG Tab, Take 1 Tab by mouth every bedtime., Disp: 90 Tab, Rfl: 3  •  busPIRone (BUSPAR) 5 MG Tab, Take 1 Tab by mouth 2 times a day., Disp: 60 Tab, Rfl: 5  •  mupirocin (BACTROBAN) 2 % Ointment, Apply BID to infected sores x 1week, Disp: 30 g, Rfl: 0  •  nystatin/triamcinolone (MYCOLOG) 533033-0.1 UNIT/GM-% Cream, Apply TID to itchy area under breasts x 1week, Disp: 45 g, Rfl: 0  •  ivabradine (CORLANOR) 5 MG Tab tablet, Take 1 Tab by mouth 2 times a day, with meals., Disp: 60 Tab, Rfl: 11  •  baclofen (LIORESAL) 10 MG Tab, Take 1 Tab by mouth 3 times a day., Disp: 90  Tab, Rfl: 5  •  sodium bicarbonate (SODIUM BICARBONATE) 650 MG Tab, Take 650 mg by mouth 3 times a day., Disp: , Rfl:   •  non-formulary med, Take 15 mL by mouth every 4 hours. CBD Oil/Sugar - every 4 hours for chornic pain, Disp: , Rfl:   •  fluoxetine (PROZAC) 10 MG Cap, TAKE ONE CAPSULE BY MOUTH ONCE A DAY, Disp: 30 Cap, Rfl: 11  •  ziprasidone (GEODON) 80 MG Cap, TAKE 2 CAPSULES BY MOUTH NIGHTLY AT BEDTIME, Disp: 60 Cap, Rfl: 11  •  fesoterodine fumarate (TOVIAZ) 4 MG TABLET SR 24 HR, Take 1 Tab by mouth every day., Disp: 90 Tab, Rfl: 3  •  albuterol 108 (90 Base) MCG/ACT Aero Soln inhalation aerosol, Inhale 2 Puffs by mouth every 6 hours as needed for Shortness of Breath., Disp: 8.5 g, Rfl: 1  •  gabapentin (NEURONTIN) 600 MG tablet, Take 1 Tab by mouth 3 times a day., Disp: 270 Tab, Rfl: 3  •  lactulose 10 GM/15ML Solution, Take 10 g by mouth 2 times a day as needed (For constipation)., Disp: , Rfl:   •  aspirin EC (ECOTRIN) 81 MG Tablet Delayed Response, Take 1 Tab by mouth every day., Disp: 30 Tab, Rfl: 6  •  Melatonin 5 MG Tab, Take 10 mg by mouth every bedtime., Disp: , Rfl:     Allergies, past medical history, past surgical history, family history, social history reviewed and updated    Objective:     Vitals: /76   Pulse 78   Temp 37 °C (98.6 °F)   Resp 16   Wt 122.5 kg (270 lb)   SpO2 98%   BMI 44.93 kg/m²   General: Alert, pleasant, NAD  HEENT: Normocephalic.  Skin: Warm, dry, no rashes.  Musculoskeletal: Gait is normal.  Moves all extremities well.  Extremities: Mild swelling over the left lateral knee; right normal. Normal range of motion, strength bilaterally in the lower extremities. No palpable masses felt in the popliteal region. No calf pain or swelling      Assessment/Plan:     Kristin was seen today for leg pain.    Diagnoses and all orders for this visit:    Posterior right knee pain  Posterior left knee pain  Possible Baker's cyst, we'll check an ultrasound of the knees. In the  meantime, recommended elevation, light activities, stretching exercises.  -     US-EXTREMITY NON VASCULAR BILATERAL; Future    Morbid obesity with BMI of 45.0-49.9, adult (HCC)  -     Patient identified as having weight management issue.  Appropriate orders and counseling given.        Return if symptoms worsen or fail to improve.

## 2018-06-12 DIAGNOSIS — R60.9 SWELLING: ICD-10-CM

## 2018-06-12 DIAGNOSIS — M79.605 BILATERAL LEG PAIN: ICD-10-CM

## 2018-06-12 DIAGNOSIS — M79.604 BILATERAL LEG PAIN: ICD-10-CM

## 2018-06-13 ENCOUNTER — HOSPITAL ENCOUNTER (OUTPATIENT)
Dept: RADIOLOGY | Facility: MEDICAL CENTER | Age: 28
End: 2018-06-13
Attending: FAMILY MEDICINE
Payer: MEDICARE

## 2018-06-13 DIAGNOSIS — M79.605 BILATERAL LEG PAIN: ICD-10-CM

## 2018-06-13 DIAGNOSIS — R60.9 SWELLING: ICD-10-CM

## 2018-06-13 DIAGNOSIS — M79.604 BILATERAL LEG PAIN: ICD-10-CM

## 2018-06-13 PROCEDURE — 93970 EXTREMITY STUDY: CPT

## 2018-06-19 ENCOUNTER — OFFICE VISIT (OUTPATIENT)
Dept: MEDICAL GROUP | Facility: MEDICAL CENTER | Age: 28
End: 2018-06-19
Payer: MEDICARE

## 2018-06-19 VITALS
WEIGHT: 276 LBS | HEART RATE: 81 BPM | RESPIRATION RATE: 16 BRPM | TEMPERATURE: 98.7 F | OXYGEN SATURATION: 97 % | HEIGHT: 65 IN | SYSTOLIC BLOOD PRESSURE: 118 MMHG | DIASTOLIC BLOOD PRESSURE: 80 MMHG | BODY MASS INDEX: 45.98 KG/M2

## 2018-06-19 DIAGNOSIS — G47.33 OSA (OBSTRUCTIVE SLEEP APNEA): ICD-10-CM

## 2018-06-19 DIAGNOSIS — E11.9 TYPE 2 DIABETES MELLITUS WITHOUT COMPLICATION, WITHOUT LONG-TERM CURRENT USE OF INSULIN (HCC): ICD-10-CM

## 2018-06-19 DIAGNOSIS — R53.83 OTHER FATIGUE: ICD-10-CM

## 2018-06-19 LAB — RETINAL SCREEN: NEGATIVE

## 2018-06-19 PROCEDURE — 92250 FUNDUS PHOTOGRAPHY W/I&R: CPT | Mod: 26 | Performed by: INTERNAL MEDICINE

## 2018-06-19 PROCEDURE — 99214 OFFICE O/P EST MOD 30 MIN: CPT | Performed by: INTERNAL MEDICINE

## 2018-06-19 NOTE — PROGRESS NOTES
CC: Follow-up diabetes complaining of fatigue.    HPI:   Kristin presents today with the following.    1. Type 2 diabetes mellitus without complication, without long-term current use of insulin ([facility])  Presents coming due for blood work she has not completed as of yet.  She is also due for eye exam.  She is denying any hypoglycemia on current regimen.    2. Other fatigue  She is complaining of excessive tiredness and fatigue.  She is not having any blood in her stool or dark tarry stool.  Denies any changes to mood and no new changes to medication.    3. TONYA (obstructive sleep apnea)  She does have sleep apnea wearing oxygen at night.  She reports she was on a CPAP in the past some reason it was discontinued unclear reasoning.      Patient Active Problem List    Diagnosis Date Noted   • Sinus tachycardia 10/31/2013     Priority: High   • Chronic inflammatory arthritis 05/23/2016     Priority: Medium   • Depression 10/28/2016     Priority: Low   • Schizophrenia ([facility]) 10/27/2016     Priority: Low   • Psychosis, schizophrenia, simple ([facility]) 09/29/2016     Priority: Low     Class: Chronic   • Acquired hypothyroidism 11/23/2015     Priority: Low   • PCOS (polycystic ovarian syndrome) 11/23/2015     Priority: Low   • Progressive focal motor weakness 06/28/2015     Priority: Low   • Fatty liver disease, nonalcoholic 01/19/2015     Priority: Low   • Anxiety 12/16/2014     Priority: Low   • Knee pain, right 02/13/2014     Priority: Low   • Neurogenic bladder 04/02/2011     Priority: Low   • Chronic UTI 09/18/2010     Priority: Low   • Borderline personality disorder in adult 09/18/2010     Priority: Low   • TONYA (obstructive sleep apnea) 01/09/2018   • Functional diarrhea 01/05/2018   • Breast wound 11/06/2017   • Morbid obesity with BMI of 45.0-49.9, adult ([facility]) 10/24/2017   • Intractable episodic cluster headache 09/14/2017   • Rash 06/09/2017   • Hashimoto's encephalopathy 05/17/2017   • Fall at home 05/13/2017   • Type 2  diabetes mellitus without complication, without long-term current use of insulin ([facility]) 04/26/2017   • On home oxygen therapy 04/15/2017   • Chest pain 03/30/2017   • Weakness of right upper extremity 02/23/2017   • Chronic suprapubic catheter ([facility]) 02/16/2017   • Hypovitaminosis D 11/29/2016   • HTN (hypertension) 11/01/2016   • Chronic pain syndrome 10/27/2016   • Bowel and bladder incontinence 10/27/2016   • Galactorrhea 07/22/2016   • Elevated sedimentation rate 06/27/2016   • Weakness of both lower extremities 06/22/2016   • Vitamin D deficiency 05/21/2016   • Morbidly obese ([facility]) 03/07/2016   • Scoliosis 03/07/2016   • GERD (gastroesophageal reflux disease) 03/07/2016   • Peripheral neuropathy (CMS-HCC) 03/06/2016   • H/O prior ablation treatment 02/10/2016       Current Outpatient Prescriptions   Medication Sig Dispense Refill   • clindamycin (CLEOCIN) 300 MG Cap Take 1 Cap by mouth 3 times a day. 30 Cap 0   • promethazine (PHENERGAN) 25 MG Suppos Insert 1 Suppository in rectum every 6 hours as needed for Nausea/Vomiting. 10 Suppository 0   • Blood Glucose Monitoring Suppl Supplies Misc Test strips order: Test strips for accucheck meter. Sig: use qday 50 Each 11   • promethazine (PHENERGAN) 25 MG Tab Take 1 Tab by mouth every 6 hours as needed for Nausea/Vomiting. 30 Tab 6   • ondansetron (ZOFRAN ODT) 4 MG TABLET DISPERSIBLE Take 1 Tab by mouth every 8 hours as needed. 12 Tab 0   • liraglutide (VICTOZA) 18 MG/3ML Solution Pen-injector injection Inject 0.3 mL as instructed every day. 3 PEN 6   • traZODone (DESYREL) 100 MG Tab Take 1 Tab by mouth every bedtime. 90 Tab 3   • busPIRone (BUSPAR) 5 MG Tab Take 1 Tab by mouth 2 times a day. 60 Tab 5   • mupirocin (BACTROBAN) 2 % Ointment Apply BID to infected sores x 1week 30 g 0   • nystatin/triamcinolone (MYCOLOG) 554813-9.1 UNIT/GM-% Cream Apply TID to itchy area under breasts x 1week 45 g 0   • ivabradine (CORLANOR) 5 MG Tab tablet Take 1 Tab by mouth 2 times a  day, with meals. 60 Tab 11   • baclofen (LIORESAL) 10 MG Tab Take 1 Tab by mouth 3 times a day. 90 Tab 5   • sodium bicarbonate (SODIUM BICARBONATE) 650 MG Tab Take 650 mg by mouth 3 times a day.     • non-formulary med Take 15 mL by mouth every 4 hours. CBD Oil/Sugar - every 4 hours for chornic pain     • fluoxetine (PROZAC) 10 MG Cap TAKE ONE CAPSULE BY MOUTH ONCE A DAY 30 Cap 11   • ziprasidone (GEODON) 80 MG Cap TAKE 2 CAPSULES BY MOUTH NIGHTLY AT BEDTIME 60 Cap 11   • fesoterodine fumarate (TOVIAZ) 4 MG TABLET SR 24 HR Take 1 Tab by mouth every day. 90 Tab 3   • albuterol 108 (90 Base) MCG/ACT Aero Soln inhalation aerosol Inhale 2 Puffs by mouth every 6 hours as needed for Shortness of Breath. 8.5 g 1   • gabapentin (NEURONTIN) 600 MG tablet Take 1 Tab by mouth 3 times a day. 270 Tab 3   • lactulose 10 GM/15ML Solution Take 10 g by mouth 2 times a day as needed (For constipation).     • aspirin EC (ECOTRIN) 81 MG Tablet Delayed Response Take 1 Tab by mouth every day. 30 Tab 6   • Melatonin 5 MG Tab Take 10 mg by mouth every bedtime.       No current facility-administered medications for this visit.          Allergies as of 06/19/2018 - Reviewed 06/19/2018   Allergen Reaction Noted   • Cefdinir Shortness of Breath and Itching 03/01/2016   • Depakote [divalproex sodium] Unspecified 06/14/2010   • Doxycycline Anaphylaxis and Vomiting 08/15/2012   • Abilify Unspecified 01/17/2013   • Amitriptyline Unspecified 10/31/2013   • Amoxicillin Rash 09/18/2010   • Ciprofloxacin Rash 12/17/2009   • Clindamycin Nausea 02/02/2011   • Ees [erythromycin] Vomiting and Nausea 08/28/2010   • Flagyl [metronidazole hcl] Unspecified 03/31/2011   • Flomax [tamsulosin hydrochloride] Swelling 09/24/2009   • Metformin Unspecified 07/23/2013   • Sulfa drugs Hives and Rash 09/18/2010   • Tape Rash 08/15/2012   • Vancomycin Itching 07/10/2016   • Wound dressing adhesive Hives 01/12/2018   • Cephalexin [keflex] Rash 01/01/2017   •  "Erythromycin Rash 03/30/2017   • Levofloxacin Unspecified 10/27/2016   • Metronidazole Rash 03/30/2017   • Valproic acid Rash 03/30/2017        ROS: Denies Chest pain, SOB, LE edema.    /80   Pulse 81   Temp 37.1 °C (98.7 °F)   Resp 16   Ht 1.651 m (5' 5\")   Wt (!) 125.2 kg (276 lb)   SpO2 97%   BMI 45.93 kg/m²     Physical Exam:  Gen:         Alert and oriented, No apparent distress.  Neck:        No Lymphadenopathy or Bruits.  Lungs:     Clear to auscultation bilaterally  CV:          Regular rate and rhythm. No murmurs, rubs or gallops.               Ext:          No clubbing, cyanosis, edema.      Assessment and Plan.   27 y.o. female with the following issues.    1. Type 2 diabetes mellitus without complication, without long-term current use of insulin (HCC)  Discussion about diet rechecking blood work.  - POCT Retinal Eye Exam  - Diabetic Monofilament Lower Extremity Exam  - COMP METABOLIC PANEL; Future  - LIPID PROFILE; Future  - HEMOGLOBIN A1C; Future  - MICROALBUMIN CREAT RATIO URINE; Future    2. Other fatigue  Have sent for additional blood work  - TSH; Future  - CBC WITH DIFFERENTIAL; Future    3. TONYA (obstructive sleep apnea)  Certainly could be reason for #2 Place referral back to pulmonology.  - REFERRAL TO PULMONOLOGY      "

## 2018-06-22 ENCOUNTER — HOSPITAL ENCOUNTER (OUTPATIENT)
Dept: LAB | Facility: MEDICAL CENTER | Age: 28
End: 2018-06-22
Attending: NURSE PRACTITIONER
Payer: MEDICARE

## 2018-06-22 LAB
25(OH)D3 SERPL-MCNC: 12 NG/ML (ref 30–100)
ALBUMIN SERPL BCP-MCNC: 4.9 G/DL (ref 3.2–4.9)
APPEARANCE UR: ABNORMAL
BACTERIA #/AREA URNS HPF: NEGATIVE /HPF
BASOPHILS # BLD AUTO: 0.6 % (ref 0–1.8)
BASOPHILS # BLD: 0.04 K/UL (ref 0–0.12)
BILIRUB UR QL STRIP.AUTO: ABNORMAL
BUN SERPL-MCNC: 10 MG/DL (ref 8–22)
CALCIUM SERPL-MCNC: 10 MG/DL (ref 8.5–10.5)
CHLORIDE SERPL-SCNC: 105 MMOL/L (ref 96–112)
CO2 SERPL-SCNC: 19 MMOL/L (ref 20–33)
COLOR UR: ABNORMAL
CREAT SERPL-MCNC: 0.82 MG/DL (ref 0.5–1.4)
CREAT UR-MCNC: 380.2 MG/DL
EOSINOPHIL # BLD AUTO: 0.2 K/UL (ref 0–0.51)
EOSINOPHIL NFR BLD: 2.9 % (ref 0–6.9)
EPI CELLS #/AREA URNS HPF: ABNORMAL /HPF
ERYTHROCYTE [DISTWIDTH] IN BLOOD BY AUTOMATED COUNT: 41.7 FL (ref 35.9–50)
EST. AVERAGE GLUCOSE BLD GHB EST-MCNC: 148 MG/DL
FERRITIN SERPL-MCNC: 153.4 NG/ML (ref 10–291)
GLUCOSE SERPL-MCNC: 98 MG/DL (ref 65–99)
GLUCOSE UR STRIP.AUTO-MCNC: NEGATIVE MG/DL
HBA1C MFR BLD: 6.8 % (ref 0–5.6)
HCT VFR BLD AUTO: 46.8 % (ref 37–47)
HGB BLD-MCNC: 16.3 G/DL (ref 12–16)
HYALINE CASTS #/AREA URNS LPF: ABNORMAL /LPF
IMM GRANULOCYTES # BLD AUTO: 0.02 K/UL (ref 0–0.11)
IMM GRANULOCYTES NFR BLD AUTO: 0.3 % (ref 0–0.9)
IRON SATN MFR SERPL: 27 % (ref 15–55)
IRON SERPL-MCNC: 128 UG/DL (ref 40–170)
KETONES UR STRIP.AUTO-MCNC: ABNORMAL MG/DL
LEUKOCYTE ESTERASE UR QL STRIP.AUTO: ABNORMAL
LYMPHOCYTES # BLD AUTO: 2.39 K/UL (ref 1–4.8)
LYMPHOCYTES NFR BLD: 34.7 % (ref 22–41)
MAGNESIUM SERPL-MCNC: 1.8 MG/DL (ref 1.5–2.5)
MCH RBC QN AUTO: 31 PG (ref 27–33)
MCHC RBC AUTO-ENTMCNC: 34.8 G/DL (ref 33.6–35)
MCV RBC AUTO: 89 FL (ref 81.4–97.8)
MICRO URNS: ABNORMAL
MONOCYTES # BLD AUTO: 0.57 K/UL (ref 0–0.85)
MONOCYTES NFR BLD AUTO: 8.3 % (ref 0–13.4)
NEUTROPHILS # BLD AUTO: 3.67 K/UL (ref 2–7.15)
NEUTROPHILS NFR BLD: 53.2 % (ref 44–72)
NITRITE UR QL STRIP.AUTO: NEGATIVE
NRBC # BLD AUTO: 0 K/UL
NRBC BLD-RTO: 0 /100 WBC
PH UR STRIP.AUTO: 6 [PH]
PHOSPHATE SERPL-MCNC: 2.4 MG/DL (ref 2.5–4.5)
PLATELET # BLD AUTO: 244 K/UL (ref 164–446)
PMV BLD AUTO: 12.1 FL (ref 9–12.9)
POTASSIUM SERPL-SCNC: 3.9 MMOL/L (ref 3.6–5.5)
PROT UR QL STRIP: 30 MG/DL
PROT UR-MCNC: 26.7 MG/DL (ref 0–15)
PROT/CREAT UR: 70 MG/G (ref 10–107)
PTH-INTACT SERPL-MCNC: 71.6 PG/ML (ref 14–72)
RBC # BLD AUTO: 5.26 M/UL (ref 4.2–5.4)
RBC # URNS HPF: ABNORMAL /HPF
RBC UR QL AUTO: NEGATIVE
SODIUM SERPL-SCNC: 137 MMOL/L (ref 135–145)
SP GR UR STRIP.AUTO: 1.03
TIBC SERPL-MCNC: 480 UG/DL (ref 250–450)
URATE SERPL-MCNC: 9.8 MG/DL (ref 1.9–8.2)
UROBILINOGEN UR STRIP.AUTO-MCNC: 0.2 MG/DL
WBC # BLD AUTO: 6.9 K/UL (ref 4.8–10.8)
WBC #/AREA URNS HPF: ABNORMAL /HPF

## 2018-06-22 PROCEDURE — 81001 URINALYSIS AUTO W/SCOPE: CPT

## 2018-06-22 PROCEDURE — 84156 ASSAY OF PROTEIN URINE: CPT

## 2018-06-22 PROCEDURE — 85025 COMPLETE CBC W/AUTO DIFF WBC: CPT

## 2018-06-22 PROCEDURE — 83970 ASSAY OF PARATHORMONE: CPT

## 2018-06-22 PROCEDURE — 83550 IRON BINDING TEST: CPT

## 2018-06-22 PROCEDURE — 82728 ASSAY OF FERRITIN: CPT

## 2018-06-22 PROCEDURE — 84550 ASSAY OF BLOOD/URIC ACID: CPT

## 2018-06-22 PROCEDURE — 80069 RENAL FUNCTION PANEL: CPT

## 2018-06-22 PROCEDURE — 83036 HEMOGLOBIN GLYCOSYLATED A1C: CPT | Mod: GA

## 2018-06-22 PROCEDURE — 82306 VITAMIN D 25 HYDROXY: CPT

## 2018-06-22 PROCEDURE — 82570 ASSAY OF URINE CREATININE: CPT

## 2018-06-22 PROCEDURE — 83540 ASSAY OF IRON: CPT

## 2018-06-22 PROCEDURE — 36415 COLL VENOUS BLD VENIPUNCTURE: CPT

## 2018-06-22 PROCEDURE — 83735 ASSAY OF MAGNESIUM: CPT

## 2018-06-26 ENCOUNTER — OFFICE VISIT (OUTPATIENT)
Dept: PULMONOLOGY | Facility: HOSPICE | Age: 28
End: 2018-06-26
Payer: MEDICARE

## 2018-06-26 VITALS
HEART RATE: 89 BPM | TEMPERATURE: 98.6 F | SYSTOLIC BLOOD PRESSURE: 110 MMHG | OXYGEN SATURATION: 95 % | HEIGHT: 65 IN | BODY MASS INDEX: 44.98 KG/M2 | RESPIRATION RATE: 16 BRPM | DIASTOLIC BLOOD PRESSURE: 80 MMHG | WEIGHT: 270 LBS

## 2018-06-26 DIAGNOSIS — Z98.890 H/O PRIOR ABLATION TREATMENT: ICD-10-CM

## 2018-06-26 DIAGNOSIS — G47.33 OSA (OBSTRUCTIVE SLEEP APNEA): ICD-10-CM

## 2018-06-26 DIAGNOSIS — E66.01 MORBIDLY OBESE (HCC): ICD-10-CM

## 2018-06-26 DIAGNOSIS — Z99.81 ON HOME OXYGEN THERAPY: Chronic | ICD-10-CM

## 2018-06-26 PROCEDURE — 99214 OFFICE O/P EST MOD 30 MIN: CPT | Performed by: NURSE PRACTITIONER

## 2018-06-26 RX ORDER — ZOLPIDEM TARTRATE 5 MG/1
5 TABLET ORAL NIGHTLY PRN
Qty: 2 TAB | Refills: 0 | Status: SHIPPED | OUTPATIENT
Start: 2018-06-26 | End: 2018-07-06 | Stop reason: SDUPTHER

## 2018-06-27 ENCOUNTER — OFFICE VISIT (OUTPATIENT)
Dept: URGENT CARE | Facility: CLINIC | Age: 28
End: 2018-06-27
Payer: MEDICARE

## 2018-06-27 VITALS
DIASTOLIC BLOOD PRESSURE: 78 MMHG | BODY MASS INDEX: 44.98 KG/M2 | TEMPERATURE: 98.5 F | HEIGHT: 65 IN | WEIGHT: 270 LBS | SYSTOLIC BLOOD PRESSURE: 108 MMHG | OXYGEN SATURATION: 96 % | HEART RATE: 70 BPM

## 2018-06-27 DIAGNOSIS — H04.201 WATERING OF RIGHT EYE: ICD-10-CM

## 2018-06-27 DIAGNOSIS — H57.11 ACUTE RIGHT EYE PAIN: ICD-10-CM

## 2018-06-27 PROCEDURE — 99213 OFFICE O/P EST LOW 20 MIN: CPT | Performed by: NURSE PRACTITIONER

## 2018-06-27 ASSESSMENT — ENCOUNTER SYMPTOMS
BLURRED VISION: 0
MYALGIAS: 0
DIZZINESS: 0
VOMITING: 0
EYE DISCHARGE: 0
DOUBLE VISION: 0
EYE PAIN: 1
SHORTNESS OF BREATH: 0
SORE THROAT: 0
PHOTOPHOBIA: 0
EYE REDNESS: 0
NAUSEA: 0
EYE ITCHING: 0
CHILLS: 0
FEVER: 0
FOREIGN BODY SENSATION: 0

## 2018-06-27 NOTE — PROGRESS NOTES
"Chief Complaint   Patient presents with   • Apnea     Last seen by Vita 7/12/2016         HPI: This patient is a 27 y.o. female, who presents for reevaluation of sleep apnea. Patient has a extensive and complicated medical history.  She tells me she was diagnosed with mitochondrial dysfunction from [facility] 2 years ago.  She has a history of sinus tach S/P ablation, history of psychiatric disorder, HTN.    She was last seen July 12, 2016 for review of pulmonary testing.  Please see Dr. Gorman's note from that date for details    Patient also has a history of mild sleep apnea with former PSG indicating an AHI of 5.7 with minimal oxygen desaturation.  She tried CPAP therapy for some period of time but was intolerant.  She returns today reporting worsening symptoms.  She is been told that she snores loudly.  She reports a very restless sleep.  She wakes up frequently at night.  She will occasionally wake up gasping for air.  She reports EDS, she naps 2 times per day 2-3 hours each time.  She never feels rested.  There is nothing to suggest narcolepsy or cataplexy.  She has a history of chronic migraines.      Past Medical History:   Diagnosis Date   • Abdominal pain    • Anginal syndrome     random chest pain especially with tachycardia   • Apnea, sleep    • Arrhythmia     \"sinus tachycardia\", cariologist, Dr. Kumar; ablation 2/2016   • Arthritis     osteo   • ASTHMA     when around smoke   • Atrial fibrillation (HCC)    • Back pain    • Borderline personality disorder    • Breath shortness     with tachycardia   • Cardiac arrhythmia    • Chickenpox    • Chronic UTI 9/18/2010   • Cough    • Daytime sleepiness    • Depression    • Diabetes (HCC)    • Diarrhea    • Disorder of thyroid    • Fall    • Fatigue    • Frequent headaches    • Gasping for breath    • Gynecological disorder     PCOS   • Headache(784.0)    • Heart burn    • History of falling    • Hypertension    • Migraine    • 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HCC)    • Multiple " "personality disorder    • Nausea    • Obesity    • Pain 08-15-12    back, 7/10   • Painful joint    • PCOS (polycystic ovarian syndrome)    • Pneumonia 2012   • Psychosis    • Renal disorder     \"kidney disease, stage 1\" nephrologist, Dr. 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   • Urinary bladder disorder     Suprapubic cath   • Urinary incontinence    • Weakness    • Wears glasses        Social History   Substance Use Topics   • Smoking status: Never Smoker   • Smokeless tobacco: Never Used   • Alcohol use No       Family History   Problem Relation Age of Onset   • Hypertension Mother    • Sleep Apnea Mother    • Heart Disease Mother    • Other Mother      hypothryod   • Hypertension Maternal Uncle    • Heart Disease Maternal Grandmother    • Hypertension Maternal Grandmother    • No Known Problems Sister    • Other Sister      Narcolepsy;fibromyalsia;bone;nerve   • Genitourinary () Sister      endometriosis       Current medications as of today   Current Outpatient Prescriptions   Medication Sig Dispense Refill   • zolpidem (AMBIEN) 5 MG Tab Take 1 Tab by mouth at bedtime as needed for Sleep for up to 2 doses. 2 Tab 0   • clindamycin (CLEOCIN) 300 MG Cap Take 1 Cap by mouth 3 times a day. 30 Cap 0   • promethazine (PHENERGAN) 25 MG Suppos Insert 1 Suppository in rectum every 6 hours as needed for Nausea/Vomiting. 10 Suppository 0   • Blood Glucose Monitoring Suppl Supplies Misc Test strips order: Test strips for accucheck meter. Sig: use qday 50 Each 11   • promethazine (PHENERGAN) 25 MG Tab Take 1 Tab by mouth every 6 hours as needed for Nausea/Vomiting. 30 Tab 6   • ondansetron (ZOFRAN ODT) 4 MG TABLET DISPERSIBLE Take 1 Tab by mouth every 8 hours as needed. 12 Tab 0   • liraglutide " (VICTOZA) 18 MG/3ML Solution Pen-injector injection Inject 0.3 mL as instructed every day. 3 PEN 6   • traZODone (DESYREL) 100 MG Tab Take 1 Tab by mouth every bedtime. 90 Tab 3   • busPIRone (BUSPAR) 5 MG Tab Take 1 Tab by mouth 2 times a day. 60 Tab 5   • mupirocin (BACTROBAN) 2 % Ointment Apply BID to infected sores x 1week 30 g 0   • nystatin/triamcinolone (MYCOLOG) 533820-0.1 UNIT/GM-% Cream Apply TID to itchy area under breasts x 1week 45 g 0   • ivabradine (CORLANOR) 5 MG Tab tablet Take 1 Tab by mouth 2 times a day, with meals. 60 Tab 11   • baclofen (LIORESAL) 10 MG Tab Take 1 Tab by mouth 3 times a day. 90 Tab 5   • sodium bicarbonate (SODIUM BICARBONATE) 650 MG Tab Take 650 mg by mouth 3 times a day.     • non-formulary med Take 15 mL by mouth every 4 hours. CBD Oil/Sugar - every 4 hours for chornic pain     • fluoxetine (PROZAC) 10 MG Cap TAKE ONE CAPSULE BY MOUTH ONCE A DAY 30 Cap 11   • ziprasidone (GEODON) 80 MG Cap TAKE 2 CAPSULES BY MOUTH NIGHTLY AT BEDTIME 60 Cap 11   • fesoterodine fumarate (TOVIAZ) 4 MG TABLET SR 24 HR Take 1 Tab by mouth every day. 90 Tab 3   • albuterol 108 (90 Base) MCG/ACT Aero Soln inhalation aerosol Inhale 2 Puffs by mouth every 6 hours as needed for Shortness of Breath. 8.5 g 1   • gabapentin (NEURONTIN) 600 MG tablet Take 1 Tab by mouth 3 times a day. 270 Tab 3   • lactulose 10 GM/15ML Solution Take 10 g by mouth 2 times a day as needed (For constipation).     • aspirin EC (ECOTRIN) 81 MG Tablet Delayed Response Take 1 Tab by mouth every day. 30 Tab 6   • Melatonin 5 MG Tab Take 10 mg by mouth every bedtime.       No current facility-administered medications for this visit.        Allergies: Cefdinir; Depakote [divalproex sodium]; Doxycycline; Abilify; Amitriptyline; Amoxicillin; Ciprofloxacin; Clindamycin; Ees [erythromycin]; Flagyl [metronidazole hcl]; Flomax [tamsulosin hydrochloride]; Metformin; Sulfa drugs; Tape; Vancomycin; Wound dressing adhesive; Cephalexin  "[keflex]; Erythromycin; Levofloxacin; Metronidazole; and Valproic acid    Blood pressure 110/80, pulse 89, temperature 37 °C (98.6 °F), resp. rate 16, height 1.651 m (5' 5\"), weight 122.5 kg (270 lb), SpO2 95 %.      ROS: As per HPI and otherwise negative if not stated.      Physical exam:   Constitutional: Obese, in no acute distress  Eyes: PERRL  Mouth/Throat: Oropharynx is moist, clear, no lesions, Mallampati 4  Neck: supple, trachea midline  Respiratory: no intercostal retractions or accessory muscle use   Lungs auscultation: Diminished bilateral bases, otherwise clear  Cardiovascular: Regular rate rhythm no murmurs, rubs or gallops  Musculoskeletal: no clubbing or cyanosis  Skin: No rashes or lesions noted on exposed skin  Neuro: No focal deficit noted  Psychiatric: Oriented to time, person and place.     Diagnosis:  1. TONYA (obstructive sleep apnea)  POLYSOMNOGRAPHY, 4 OR MORE    zolpidem (AMBIEN) 5 MG Tab   2. On home oxygen therapy     3. H/O prior ablation treatment     4. Morbidly obese (HCC)         Plan:    I reviewed with the patient the pathophysiology of obstructive sleep apnea, as well as potential cardiac and neurologic risks associated with untreated sleep apnea. We reviewed treatment options including CPAP/BiPAP therapy, dental appliance. From the symptoms patient's describing I suspect her apnea has become more severe since her last sleep study.  She is amendable to retrial of CPAP if indicated.    Recommend PSG, split-night study if possible ASAP  Rx for Ambien was provided for the night of her test, she understands to use this for her the night of her sleep study only.  She will not mix with other sedatives or narcotics for pain control.  She will follow-up after testing to review results        "

## 2018-06-28 NOTE — PROGRESS NOTES
Subjective:   Kristin Balderrama is a 27 y.o. female who presents for Eye Problem (painful, swollen, watery )  Patient presents clinic today with complaints of her right eye being painful, watering, swollen since 5 AM this morning. Patient denies any loss of vision, redness, crusting of the right eye. Patient does not wear contacts. Patient has not seen an eye doctor in years as she tried to make an appointment and cannot get an evaluation until August. Patient denies any mechanism of injury, no foreign body present.      Eye Problem    The right eye is affected. This is a new problem. The current episode started today. The problem occurs constantly. The problem has been unchanged. There was no injury mechanism. The pain is at a severity of 3/10. The pain is mild. There is no known exposure to pink eye. She does not wear contacts. Pertinent negatives include no blurred vision, eye discharge (watering), double vision, eye redness, fever, foreign body sensation, itching, nausea, photophobia, recent URI or vomiting. She has tried nothing for the symptoms. The treatment provided no relief.     Review of Systems   Constitutional: Negative for chills and fever.   HENT: Negative for sore throat.    Eyes: Positive for pain. Negative for blurred vision, double vision, photophobia, discharge (watering), redness and itching.   Respiratory: Negative for shortness of breath.    Cardiovascular: Negative for chest pain.   Gastrointestinal: Negative for nausea and vomiting.   Genitourinary: Negative for hematuria.   Musculoskeletal: Negative for myalgias.   Skin: Negative for rash.   Neurological: Negative for dizziness.     Allergies   Allergen Reactions   • Cefdinir Shortness of Breath and Itching     Tolerated 1/18/17   • Depakote [Divalproex Sodium] Unspecified     Muscle spasms/muscle pain and weakness     • Doxycycline Anaphylaxis and Vomiting     RXN=unknown   • Abilify Unspecified     Headaches/muscle twitching     •  "Amitriptyline Unspecified     Headaches     • Amoxicillin Rash     Pt states \"I get a rash\".     • Ciprofloxacin Rash     Pt states \"I get a rash\".     • Clindamycin Nausea     Even with food     • Ees [Erythromycin] Vomiting and Nausea   • Flagyl [Metronidazole Hcl] Unspecified     \"eye problems\"     • Flomax [Tamsulosin Hydrochloride] Swelling   • Metformin Unspecified     Increased lactic acid      • Sulfa Drugs Hives and Rash     RXN=since childhood   • Tape Rash     Tears skin off   coban with Tegaderm tape ok  RXN=ongoing   • Vancomycin Itching     Pt becomes flushed in face and chest.   RXN=7/10/16   • Wound Dressing Adhesive Hives     By pt report   • Cephalexin [Keflex] Rash     Pt states she gets a rash with this medication   • Erythromycin Rash     .   • Levofloxacin Unspecified     Leg muscle cramps   • Metronidazole Rash     .   • Valproic Acid Rash     .      Objective:   /78   Pulse 70   Temp 36.9 °C (98.5 °F)   Ht 1.651 m (5' 5\")   Wt 122.5 kg (270 lb)   SpO2 96%   BMI 44.93 kg/m²   Physical Exam   Constitutional: She is oriented to person, place, and time. She appears well-developed and well-nourished. No distress.   HENT:   Head: Normocephalic and atraumatic.   Eyes: Conjunctivae, EOM and lids are normal. Pupils are equal, round, and reactive to light. Right eye exhibits no discharge, no exudate and no hordeolum. No foreign body present in the right eye. Left eye exhibits no discharge, no exudate and no hordeolum. No foreign body present in the left eye. Right conjunctiva is not injected. Right conjunctiva has no hemorrhage. Left conjunctiva is not injected. Left conjunctiva has no hemorrhage. No scleral icterus. Right eye exhibits normal extraocular motion and no nystagmus. Left eye exhibits normal extraocular motion and no nystagmus.   Visual acuity right eye 20/40, left eye 20/40.    Cardiovascular: Normal rate and regular rhythm.    No murmur heard.  Pulmonary/Chest: Effort normal " and breath sounds normal. No respiratory distress.   Abdominal: Soft. She exhibits no distension. There is no tenderness.   Neurological: She is alert and oriented to person, place, and time. She has normal reflexes. No sensory deficit.   Skin: Skin is warm and dry.   Psychiatric: She has a normal mood and affect.         Assessment/Plan:   Assessment    1. Watering of right eye  2. Acute right eye pain  Visual acuity normal, eye without redness, no discharge present, no extra ocular motions present. No lesions present. At this time advised patient will have her follow-up in the morning with [facility] as they are not open at this hour. Patient will follow up with eye specialist for thorough evaluation of right eye as she is not having acute loss of vision will not refer to the emergency room at this hour..    Differential diagnosis, natural history, supportive care, and indications for immediate follow-up discussed.

## 2018-07-02 ENCOUNTER — OFFICE VISIT (OUTPATIENT)
Dept: MEDICAL GROUP | Facility: MEDICAL CENTER | Age: 28
End: 2018-07-02
Payer: MEDICARE

## 2018-07-02 ENCOUNTER — TELEPHONE (OUTPATIENT)
Dept: MEDICAL GROUP | Facility: MEDICAL CENTER | Age: 28
End: 2018-07-02

## 2018-07-02 VITALS
BODY MASS INDEX: 45.48 KG/M2 | TEMPERATURE: 98.4 F | WEIGHT: 273 LBS | RESPIRATION RATE: 16 BRPM | OXYGEN SATURATION: 94 % | SYSTOLIC BLOOD PRESSURE: 122 MMHG | HEIGHT: 65 IN | HEART RATE: 83 BPM | DIASTOLIC BLOOD PRESSURE: 80 MMHG

## 2018-07-02 DIAGNOSIS — E11.9 TYPE 2 DIABETES MELLITUS WITHOUT COMPLICATION, WITHOUT LONG-TERM CURRENT USE OF INSULIN (HCC): ICD-10-CM

## 2018-07-02 DIAGNOSIS — R05.8 COUGH WITH EXPECTORATION: ICD-10-CM

## 2018-07-02 DIAGNOSIS — E79.0 ELEVATED BLOOD URIC ACID LEVEL: ICD-10-CM

## 2018-07-02 DIAGNOSIS — E55.9 VITAMIN D DEFICIENCY: ICD-10-CM

## 2018-07-02 DIAGNOSIS — N30.00 ACUTE CYSTITIS WITHOUT HEMATURIA: ICD-10-CM

## 2018-07-02 PROCEDURE — 99214 OFFICE O/P EST MOD 30 MIN: CPT | Performed by: NURSE PRACTITIONER

## 2018-07-02 RX ORDER — NITROFURANTOIN 25; 75 MG/1; MG/1
100 CAPSULE ORAL 2 TIMES DAILY
Qty: 14 CAP | Refills: 0 | Status: SHIPPED | OUTPATIENT
Start: 2018-07-02 | End: 2018-07-09

## 2018-07-02 RX ORDER — CHOLECALCIFEROL (VITAMIN D3) 1250 MCG
1 CAPSULE ORAL
Qty: 4 CAP | Refills: 5 | Status: SHIPPED | OUTPATIENT
Start: 2018-07-02 | End: 2018-09-07

## 2018-07-02 ASSESSMENT — ENCOUNTER SYMPTOMS
COUGH: 1
SPUTUM PRODUCTION: 1

## 2018-07-02 NOTE — PROGRESS NOTES
Subjective:      Kristin Balderrama is a 27 y.o. female who presents with Results (Urine Results)        CC: Patient hears same day visit mainly to discuss recent abnormal labs ordered by her nephrology group.    HPI Kristin Balderrama      1. Type 2 diabetes mellitus without complication, without long-term current use of insulin (HCC)  Patient is currently on Victoza and she was doing well on this up until recently with her most recent hemoglobin A1c at 6.8. She states she was on Januvia at one time and had no problems with the medicine. She thinks her blood sugars are higher now because of poor carbohydrate avoidance.    2. Elevated blood uric acid level  Lab work from nephrology shows mildly elevated blood uric acid levels with patient not complaining of gout today.    3. Vitamin D deficiency  Vitamin D levels were low at 12 and she is not currently on vitamin D.    4. Acute cystitis without hematuria  Patient's main concern is that her urinalysis came back showing  white blood cells and 2-5 red blood cells. She is prone to UTIs. She states the only antibiotic she is not allergic to his Macrobid and clindamycin.    5. Cough with expectoration  Patient also wanted her cough checked today. Her mother states she notices the patient is coughing throughout the day and expectorating clear phlegm. There was no cough noted throughout the exam. She has had no fever, chills, shortness of breath or chest pain.  Current Outpatient Prescriptions   Medication Sig Dispense Refill   • Cholecalciferol (VITAMIN D3) 25167 units Cap Take 1 Each by mouth every 7 days. 4 Cap 5   • SITagliptin (JANUVIA) 100 MG Tab Take 1 Tab by mouth every day. 30 Tab 3   • clindamycin (CLEOCIN) 300 MG Cap Take 1 Cap by mouth 3 times a day. 30 Cap 0   • promethazine (PHENERGAN) 25 MG Suppos Insert 1 Suppository in rectum every 6 hours as needed for Nausea/Vomiting. 10 Suppository 0   • Blood Glucose Monitoring Suppl Supplies Misc Test strips  order: Test strips for accucheck meter. Sig: use qday 50 Each 11   • ondansetron (ZOFRAN ODT) 4 MG TABLET DISPERSIBLE Take 1 Tab by mouth every 8 hours as needed. 12 Tab 0   • liraglutide (VICTOZA) 18 MG/3ML Solution Pen-injector injection Inject 0.3 mL as instructed every day. 3 PEN 6   • traZODone (DESYREL) 100 MG Tab Take 1 Tab by mouth every bedtime. 90 Tab 3   • busPIRone (BUSPAR) 5 MG Tab Take 1 Tab by mouth 2 times a day. 60 Tab 5   • mupirocin (BACTROBAN) 2 % Ointment Apply BID to infected sores x 1week 30 g 0   • nystatin/triamcinolone (MYCOLOG) 417103-8.1 UNIT/GM-% Cream Apply TID to itchy area under breasts x 1week 45 g 0   • ivabradine (CORLANOR) 5 MG Tab tablet Take 1 Tab by mouth 2 times a day, with meals. 60 Tab 11   • baclofen (LIORESAL) 10 MG Tab Take 1 Tab by mouth 3 times a day. 90 Tab 5   • sodium bicarbonate (SODIUM BICARBONATE) 650 MG Tab Take 650 mg by mouth 3 times a day.     • non-formulary med Take 15 mL by mouth every 4 hours. CBD Oil/Sugar - every 4 hours for chornic pain     • fluoxetine (PROZAC) 10 MG Cap TAKE ONE CAPSULE BY MOUTH ONCE A DAY 30 Cap 11   • ziprasidone (GEODON) 80 MG Cap TAKE 2 CAPSULES BY MOUTH NIGHTLY AT BEDTIME 60 Cap 11   • fesoterodine fumarate (TOVIAZ) 4 MG TABLET SR 24 HR Take 1 Tab by mouth every day. 90 Tab 3   • albuterol 108 (90 Base) MCG/ACT Aero Soln inhalation aerosol Inhale 2 Puffs by mouth every 6 hours as needed for Shortness of Breath. 8.5 g 1   • gabapentin (NEURONTIN) 600 MG tablet Take 1 Tab by mouth 3 times a day. 270 Tab 3   • lactulose 10 GM/15ML Solution Take 10 g by mouth 2 times a day as needed (For constipation).     • aspirin EC (ECOTRIN) 81 MG Tablet Delayed Response Take 1 Tab by mouth every day. 30 Tab 6   • Melatonin 5 MG Tab Take 10 mg by mouth every bedtime.     • zolpidem (AMBIEN) 5 MG Tab Take 1 Tab by mouth at bedtime as needed for Sleep for up to 2 doses. 2 Tab 0     No current facility-administered medications for this visit.   "    Social History   Substance Use Topics   • Smoking status: Never Smoker   • Smokeless tobacco: Never Used   • Alcohol use No     Past Medical History:   Diagnosis Date   • Abdominal pain    • Anginal syndrome     random chest pain especially with tachycardia   • Apnea, sleep    • Arrhythmia     \"sinus tachycardia\", cariologist, Dr. Kumar; ablation 2/2016   • Arthritis     osteo   • ASTHMA     when around smoke   • Atrial fibrillation (HCC)    • Back pain    • Borderline personality disorder    • Breath shortness     with tachycardia   • Cardiac arrhythmia    • Chickenpox    • Chronic UTI 9/18/2010   • Cough    • Daytime sleepiness    • Depression    • Diabetes (HCC)    • Diarrhea    • Disorder of thyroid    • Fall    • Fatigue    • Frequent headaches    • Gasping for breath    • Gynecological disorder     PCOS   • Headache(784.0)    • Heart burn    • History of falling    • Hypertension    • Migraine    • 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HCC)    • Multiple personality disorder    • Nausea    • Obesity    • Pain 08-15-12    back, 7/10   • Painful joint    • PCOS (polycystic ovarian syndrome)    • Pneumonia 2012   • Psychosis    • Renal disorder     \"kidney disease, stage 1\" nephrologist, Dr. 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   • Urinary bladder disorder     Suprapubic cath   • Urinary incontinence    • Weakness    • Wears glasses      Family History   Problem Relation Age of Onset   • Hypertension Mother    • Sleep Apnea Mother    • Heart Disease Mother    • Other Mother      hypothryod   • Hypertension Maternal Uncle    • Heart Disease Maternal Grandmother    • Hypertension Maternal Grandmother    • No Known Problems Sister    • Other Sister      Narcolepsy;fibromyalsia;bone;nerve   • " "Genitourinary () Sister      endometriosis       Review of Systems   Respiratory: Positive for cough and sputum production.    All other systems reviewed and are negative.         Objective:     /80   Pulse 83   Temp 36.9 °C (98.4 °F)   Resp 16   Ht 1.651 m (5' 5\")   Wt 123.8 kg (273 lb)   SpO2 94%   BMI 45.43 kg/m²      Physical Exam   Constitutional: She is oriented to person, place, and time. She appears well-developed and well-nourished. No distress.   HENT:   Head: Normocephalic and atraumatic.   Right Ear: External ear normal.   Left Ear: External ear normal.   Nose: Nose normal.   Eyes: Right eye exhibits no discharge. Left eye exhibits no discharge.   Neck: Normal range of motion. Neck supple. No thyromegaly present.   Cardiovascular: Normal rate, regular rhythm and normal heart sounds.  Exam reveals no gallop and no friction rub.    No murmur heard.  Pulmonary/Chest: Effort normal and breath sounds normal. She has no wheezes. She has no rales.   Musculoskeletal: She exhibits no edema or tenderness.   Patient uses walker for mobility.   Neurological: She is alert and oriented to person, place, and time. She displays normal reflexes.   Skin: Skin is warm and dry. No rash noted. She is not diaphoretic.   Psychiatric: She has a normal mood and affect. Her behavior is normal. Judgment and thought content normal.   Nursing note and vitals reviewed.              Assessment/Plan:     1. Type 2 diabetes mellitus without complication, without long-term current use of insulin (HCC)  Patient's hemoglobin A1c has increased to 6.8 and she states that her home blood sugar readings are sometimes in the 200 range so I will add on Januvia to her Victoza. She states she tolerated this well in the past. I was considering Jardiance but UTIs are the most common side effect of the medicine and patient is prone to UTIs.  - SITagliptin (JANUVIA) 100 MG Tab; Take 1 Tab by mouth every day.  Dispense: 30 Tab; Refill: " 3    2. Elevated blood uric acid level  Uric acid level elevated and patient may be a good candidate for allopurinol but I advised her to speak with nephrology who did the lab work about this and she has an appointment next week with them.    3. Vitamin D deficiency  Patient to get back on vitamin D because of her low vitamin D level at 12.  - Cholecalciferol (VITAMIN D3) 85903 units Cap; Take 1 Each by mouth every 7 days.  Dispense: 4 Cap; Refill: 5    4. Acute cystitis without hematuria  Patient appears to have UTI based on lab work ordered by nephrology so I will put her on Macrobid for 7 days since this is one of the only antibiotics she can tolerate.    5. Cough with expectoration  Vital signs are normal and lungs sound clear. Patient was wondering about antibiotics and I offered to send her for a chest x-ray which she declined. I advised her to monitor this for any changes. She would be limited on an antibiotic she can use and I would prefer not to put her on an antibiotic unless necessary. I told her to watch for fever, chills, increasing shortness of breath or congestion.

## 2018-07-02 NOTE — TELEPHONE ENCOUNTER
1. Caller Name: sally                      Call Back Number: [phone] (home)       2. Message: patient was seen earlier in clinic she was wondering if you could send the script for macrobid to her pharmacy     3. Patient approves office to leave a detailed voicemail/MyChart message: N\A

## 2018-07-05 ENCOUNTER — TELEPHONE (OUTPATIENT)
Dept: PULMONOLOGY | Facility: HOSPICE | Age: 28
End: 2018-07-05

## 2018-07-05 DIAGNOSIS — G47.33 OSA (OBSTRUCTIVE SLEEP APNEA): ICD-10-CM

## 2018-07-05 NOTE — TELEPHONE ENCOUNTER
Pt called stating that if she can get another Ambien Rx, she seemed to misplace it.    Please Advise

## 2018-07-06 RX ORDER — ZOLPIDEM TARTRATE 5 MG/1
5 TABLET ORAL NIGHTLY PRN
Qty: 2 TAB | Refills: 0 | Status: SHIPPED
Start: 2018-07-06 | End: 2018-07-14

## 2018-07-13 ENCOUNTER — OFFICE VISIT (OUTPATIENT)
Dept: MEDICAL GROUP | Facility: CLINIC | Age: 28
End: 2018-07-13
Payer: MEDICARE

## 2018-07-13 VITALS
TEMPERATURE: 99 F | WEIGHT: 275 LBS | RESPIRATION RATE: 16 BRPM | HEIGHT: 65 IN | DIASTOLIC BLOOD PRESSURE: 70 MMHG | HEART RATE: 80 BPM | SYSTOLIC BLOOD PRESSURE: 124 MMHG | OXYGEN SATURATION: 98 % | BODY MASS INDEX: 45.82 KG/M2

## 2018-07-13 DIAGNOSIS — M54.16 LEFT LUMBAR RADICULOPATHY: ICD-10-CM

## 2018-07-13 DIAGNOSIS — M51.16 LUMBAR DISC DISEASE WITH RADICULOPATHY: ICD-10-CM

## 2018-07-13 DIAGNOSIS — Z98.890 HISTORY OF LUMBAR SURGERY: ICD-10-CM

## 2018-07-13 PROCEDURE — 99213 OFFICE O/P EST LOW 20 MIN: CPT | Performed by: FAMILY MEDICINE

## 2018-07-13 RX ORDER — PREDNISONE 20 MG/1
20 TABLET ORAL 2 TIMES DAILY WITH MEALS
Qty: 10 TAB | Refills: 0 | Status: SHIPPED | OUTPATIENT
Start: 2018-07-13 | End: 2018-07-18

## 2018-07-13 RX ORDER — HYDROCODONE BITARTRATE AND ACETAMINOPHEN 10; 325 MG/1; MG/1
1 TABLET ORAL EVERY 4 HOURS PRN
Qty: 40 TAB | Refills: 0 | Status: SHIPPED | OUTPATIENT
Start: 2018-07-13 | End: 2018-07-20

## 2018-07-13 NOTE — PROGRESS NOTES
Chief Complaint   Patient presents with   • Back Pain       Subjective:     HPI:   Kristin Balderrama presents today with the following: Patient is known to me from having seen her a couple times in the past.  She has a very complex history.    1. Lumbar disc disease with radiculopathy/History of lumbar surgery/Left lumbar radiculopathy  Patient had lumbar surgery with Dr. Stringer in 2012.  This has been quite successful per patient.  However, she bent over a week ago and since has had searing pain down the left thigh and leg.  She is on gabapentin and that is not helping her current pain.  She would like an urgent referral back to Dr. Stringer's office for reevaluation and possible epidural or other treatment.  Describes 5 days of being unable to sleep unable to get away from this pain.  She has used ice and heat has tried stretching.  She is taking her gabapentin religiously.  Discussed a short course of steroids as a possible treatment.  Discussed 7 day course of hydrocodone as an acute treatment with further treatment per Dr. Stringer's office.  Discussed MRI with patient.  She is unable to tolerate MRI and can only do a CT.  Have ordered CT without contrast at patient's insistence.        Patient Active Problem List    Diagnosis Date Noted   • Sinus tachycardia 10/31/2013     Priority: High   • Chronic inflammatory arthritis 05/23/2016     Priority: Medium   • Depression 10/28/2016     Priority: Low   • Schizophrenia (HCC) 10/27/2016     Priority: Low   • Psychosis, schizophrenia, simple (HCC) 09/29/2016     Priority: Low     Class: Chronic   • Acquired hypothyroidism 11/23/2015     Priority: Low   • PCOS (polycystic ovarian syndrome) 11/23/2015     Priority: Low   • Progressive focal motor weakness 06/28/2015     Priority: Low   • Fatty liver disease, nonalcoholic 01/19/2015     Priority: Low   • Anxiety 12/16/2014     Priority: Low   • Knee pain, right 02/13/2014     Priority: Low   • Neurogenic bladder 04/02/2011      Priority: Low   • Chronic UTI 09/18/2010     Priority: Low   • Borderline personality disorder in adult 09/18/2010     Priority: Low   • TONYA (obstructive sleep apnea) 01/09/2018   • Functional diarrhea 01/05/2018   • Breast wound 11/06/2017   • Morbid obesity with BMI of 45.0-49.9, adult ([facility]) 10/24/2017   • Intractable episodic cluster headache 09/14/2017   • Rash 06/09/2017   • Hashimoto's encephalopathy 05/17/2017   • Fall at home 05/13/2017   • Type 2 diabetes mellitus without complication, without long-term current use of insulin ([facility]) 04/26/2017   • On home oxygen therapy 04/15/2017   • Chest pain 03/30/2017   • Weakness of right upper extremity 02/23/2017   • Chronic suprapubic catheter ([facility]) 02/16/2017   • Hypovitaminosis D 11/29/2016   • HTN (hypertension) 11/01/2016   • Chronic pain syndrome 10/27/2016   • Bowel and bladder incontinence 10/27/2016   • Galactorrhea 07/22/2016   • Elevated sedimentation rate 06/27/2016   • Weakness of both lower extremities 06/22/2016   • Vitamin D deficiency 05/21/2016   • Morbidly obese ([facility]) 03/07/2016   • Scoliosis 03/07/2016   • GERD (gastroesophageal reflux disease) 03/07/2016   • Peripheral neuropathy (CMS-HCC) 03/06/2016   • H/O prior ablation treatment 02/10/2016       Current medicines (including changes today)  Current Outpatient Prescriptions   Medication Sig Dispense Refill   • predniSONE (DELTASONE) 20 MG Tab Take 1 Tab by mouth 2 times a day, with meals for 5 days. 10 Tab 0   • HYDROcodone/acetaminophen (NORCO)  MG Tab Take 1 Tab by mouth every four hours as needed for Severe Pain for up to 7 days. 40 Tab 0   • zolpidem (AMBIEN) 5 MG Tab Take 1 Tab by mouth at bedtime as needed for Sleep for up to 2 doses. 2 Tab 0   • Cholecalciferol (VITAMIN D3) 26339 units Cap Take 1 Each by mouth every 7 days. 4 Cap 5   • SITagliptin (JANUVIA) 100 MG Tab Take 1 Tab by mouth every day. 30 Tab 3   • clindamycin (CLEOCIN) 300 MG Cap Take 1 Cap by mouth 3 times a day.  30 Cap 0   • promethazine (PHENERGAN) 25 MG Suppos Insert 1 Suppository in rectum every 6 hours as needed for Nausea/Vomiting. 10 Suppository 0   • Blood Glucose Monitoring Suppl Supplies Misc Test strips order: Test strips for accucheck meter. Sig: use qday 50 Each 11   • ondansetron (ZOFRAN ODT) 4 MG TABLET DISPERSIBLE Take 1 Tab by mouth every 8 hours as needed. 12 Tab 0   • liraglutide (VICTOZA) 18 MG/3ML Solution Pen-injector injection Inject 0.3 mL as instructed every day. 3 PEN 6   • traZODone (DESYREL) 100 MG Tab Take 1 Tab by mouth every bedtime. 90 Tab 3   • busPIRone (BUSPAR) 5 MG Tab Take 1 Tab by mouth 2 times a day. 60 Tab 5   • mupirocin (BACTROBAN) 2 % Ointment Apply BID to infected sores x 1week 30 g 0   • nystatin/triamcinolone (MYCOLOG) 039711-0.1 UNIT/GM-% Cream Apply TID to itchy area under breasts x 1week 45 g 0   • ivabradine (CORLANOR) 5 MG Tab tablet Take 1 Tab by mouth 2 times a day, with meals. 60 Tab 11   • baclofen (LIORESAL) 10 MG Tab Take 1 Tab by mouth 3 times a day. 90 Tab 5   • sodium bicarbonate (SODIUM BICARBONATE) 650 MG Tab Take 650 mg by mouth 3 times a day.     • non-formulary med Take 15 mL by mouth every 4 hours. CBD Oil/Sugar - every 4 hours for chornic pain     • fluoxetine (PROZAC) 10 MG Cap TAKE ONE CAPSULE BY MOUTH ONCE A DAY 30 Cap 11   • ziprasidone (GEODON) 80 MG Cap TAKE 2 CAPSULES BY MOUTH NIGHTLY AT BEDTIME 60 Cap 11   • fesoterodine fumarate (TOVIAZ) 4 MG TABLET SR 24 HR Take 1 Tab by mouth every day. 90 Tab 3   • albuterol 108 (90 Base) MCG/ACT Aero Soln inhalation aerosol Inhale 2 Puffs by mouth every 6 hours as needed for Shortness of Breath. 8.5 g 1   • gabapentin (NEURONTIN) 600 MG tablet Take 1 Tab by mouth 3 times a day. 270 Tab 3   • lactulose 10 GM/15ML Solution Take 10 g by mouth 2 times a day as needed (For constipation).     • aspirin EC (ECOTRIN) 81 MG Tablet Delayed Response Take 1 Tab by mouth every day. 30 Tab 6   • Melatonin 5 MG Tab Take 10 mg  "by mouth every bedtime.       No current facility-administered medications for this visit.        Allergies   Allergen Reactions   • Cefdinir Shortness of Breath and Itching     Tolerated 1/18/17   • Depakote [Divalproex Sodium] Unspecified     Muscle spasms/muscle pain and weakness     • Doxycycline Anaphylaxis and Vomiting     RXN=unknown   • Abilify Unspecified     Headaches/muscle twitching     • Amitriptyline Unspecified     Headaches     • Amoxicillin Rash     Pt states \"I get a rash\".     • Ciprofloxacin Rash     Pt states \"I get a rash\".     • Clindamycin Nausea     Even with food     • Ees [Erythromycin] Vomiting and Nausea   • Flagyl [Metronidazole Hcl] Unspecified     \"eye problems\"     • Flomax [Tamsulosin Hydrochloride] Swelling   • Metformin Unspecified     Increased lactic acid      • Sulfa Drugs Hives and Rash     RXN=since childhood   • Tape Rash     Tears skin off   coban with Tegaderm tape ok  RXN=ongoing   • Vancomycin Itching     Pt becomes flushed in face and chest.   RXN=7/10/16   • Wound Dressing Adhesive Hives     By pt report   • Cephalexin [Keflex] Rash     Pt states she gets a rash with this medication   • Erythromycin Rash     .   • Levofloxacin Unspecified     Leg muscle cramps   • Metronidazole Rash     .   • Valproic Acid Rash     .       ROS: As per HPI       Objective:     Blood pressure 124/70, pulse 80, temperature 37.2 °C (99 °F), resp. rate 16, height 1.651 m (5' 5\"), weight 124.7 kg (275 lb), SpO2 98 %, not currently breastfeeding. Body mass index is 45.76 kg/m².    Physical Exam:  Constitutional: Well-developed and well-nourished. Not diaphoretic.  She is in distress from pain. Lucid and fluent.  Skin: Skin is warm and dry. No rash noted.  Head: Atraumatic without lesions.  Eyes: Conjunctivae and extraocular motions are normal. Pupils are equal, round, and reactive to light.   Mouth/Throat: Tongue normal. Oropharynx is clear and moist. Posterior pharynx without erythema or " exudates.  Neck: No thyromegaly present. No cervical or supraclavicular lymphadenopathy. No JVD or carotid bruits appreciated  Chest: Effort normal. Clear to auscultation throughout. No adventitious sounds.   Extremities: No cyanosis, clubbing, erythema, nor edema.   Neurological: Alert and oriented x 3.  Gait is abnormal.  Patient is sitting in a very extended posture.  She walks with a walker with her back quite a extended.  This is quite an unusual walk.  Balance is not very good and the left leg does seem to be somewhat weak.  I am glad she is using the walker.  Psychiatric:  Behavior, mood, and affect are appropriate, she is in considerable distress.       Assessment and Plan:     27 y.o. female with the following issues:    1. Lumbar disc disease with radiculopathy  REFERRAL TO NEUROSURGERY    CT-LSPINE W/O PLUS RECONS    predniSONE (DELTASONE) 20 MG Tab    HYDROcodone/acetaminophen (NORCO)  MG Tab    CONSENT FOR OPIATE PRESCRIPTION   2. History of lumbar surgery  REFERRAL TO NEUROSURGERY    CT-LSPINE W/O PLUS RECONS    HYDROcodone/acetaminophen (NORCO)  MG Tab   3. Left lumbar radiculopathy  CT-LSPINE W/O PLUS RECONS    predniSONE (DELTASONE) 20 MG Tab    HYDROcodone/acetaminophen (NORCO)  MG Tab    CONSENT FOR OPIATE PRESCRIPTION         Followup: Return if symptoms worsen or fail to improve.

## 2018-07-14 ENCOUNTER — APPOINTMENT (OUTPATIENT)
Dept: RADIOLOGY | Facility: MEDICAL CENTER | Age: 28
End: 2018-07-14
Attending: EMERGENCY MEDICINE
Payer: MEDICARE

## 2018-07-14 ENCOUNTER — APPOINTMENT (OUTPATIENT)
Dept: RADIOLOGY | Facility: MEDICAL CENTER | Age: 28
End: 2018-07-14
Attending: HOSPITALIST
Payer: MEDICARE

## 2018-07-14 ENCOUNTER — HOSPITAL ENCOUNTER (OUTPATIENT)
Facility: MEDICAL CENTER | Age: 28
End: 2018-07-16
Attending: EMERGENCY MEDICINE | Admitting: HOSPITALIST
Payer: MEDICARE

## 2018-07-14 DIAGNOSIS — F41.9 ANXIETY: ICD-10-CM

## 2018-07-14 DIAGNOSIS — F60.3 BORDERLINE PERSONALITY DISORDER IN ADULT (HCC): ICD-10-CM

## 2018-07-14 DIAGNOSIS — R53.1 PROGRESSIVE FOCAL MOTOR WEAKNESS: ICD-10-CM

## 2018-07-14 PROBLEM — R29.898 LEFT LEG WEAKNESS: Status: ACTIVE | Noted: 2018-07-14

## 2018-07-14 LAB
ANION GAP SERPL CALC-SCNC: 12 MMOL/L (ref 0–11.9)
APTT PPP: 27.2 SEC (ref 24.7–36)
BASOPHILS # BLD AUTO: 0.6 % (ref 0–1.8)
BASOPHILS # BLD: 0.05 K/UL (ref 0–0.12)
BUN SERPL-MCNC: 8 MG/DL (ref 8–22)
CALCIUM SERPL-MCNC: 9.9 MG/DL (ref 8.5–10.5)
CHLORIDE SERPL-SCNC: 100 MMOL/L (ref 96–112)
CO2 SERPL-SCNC: 23 MMOL/L (ref 20–33)
CREAT SERPL-MCNC: 0.69 MG/DL (ref 0.5–1.4)
CRP SERPL HS-MCNC: 0.69 MG/DL (ref 0–0.75)
EOSINOPHIL # BLD AUTO: 0.04 K/UL (ref 0–0.51)
EOSINOPHIL NFR BLD: 0.5 % (ref 0–6.9)
ERYTHROCYTE [DISTWIDTH] IN BLOOD BY AUTOMATED COUNT: 40.6 FL (ref 35.9–50)
ERYTHROCYTE [SEDIMENTATION RATE] IN BLOOD BY WESTERGREN METHOD: 8 MM/HOUR (ref 0–20)
EST. AVERAGE GLUCOSE BLD GHB EST-MCNC: 148 MG/DL
GLUCOSE BLD-MCNC: 104 MG/DL (ref 65–99)
GLUCOSE BLD-MCNC: 130 MG/DL (ref 65–99)
GLUCOSE BLD-MCNC: 130 MG/DL (ref 65–99)
GLUCOSE BLD-MCNC: 176 MG/DL (ref 65–99)
GLUCOSE BLD-MCNC: 96 MG/DL (ref 65–99)
GLUCOSE SERPL-MCNC: 121 MG/DL (ref 65–99)
HBA1C MFR BLD: 6.8 % (ref 0–5.6)
HCG UR QL: NEGATIVE
HCT VFR BLD AUTO: 45.7 % (ref 37–47)
HGB BLD-MCNC: 15.9 G/DL (ref 12–16)
IMM GRANULOCYTES # BLD AUTO: 0.03 K/UL (ref 0–0.11)
IMM GRANULOCYTES NFR BLD AUTO: 0.4 % (ref 0–0.9)
INR PPP: 1.02 (ref 0.87–1.13)
LYMPHOCYTES # BLD AUTO: 1.92 K/UL (ref 1–4.8)
LYMPHOCYTES NFR BLD: 23.3 % (ref 22–41)
MCH RBC QN AUTO: 31.4 PG (ref 27–33)
MCHC RBC AUTO-ENTMCNC: 34.8 G/DL (ref 33.6–35)
MCV RBC AUTO: 90.3 FL (ref 81.4–97.8)
MONOCYTES # BLD AUTO: 0.54 K/UL (ref 0–0.85)
MONOCYTES NFR BLD AUTO: 6.6 % (ref 0–13.4)
NEUTROPHILS # BLD AUTO: 5.65 K/UL (ref 2–7.15)
NEUTROPHILS NFR BLD: 68.6 % (ref 44–72)
NRBC # BLD AUTO: 0 K/UL
NRBC BLD-RTO: 0 /100 WBC
PLATELET # BLD AUTO: 239 K/UL (ref 164–446)
PMV BLD AUTO: 11.3 FL (ref 9–12.9)
POTASSIUM SERPL-SCNC: 4.1 MMOL/L (ref 3.6–5.5)
PROTHROMBIN TIME: 13.1 SEC (ref 12–14.6)
RBC # BLD AUTO: 5.06 M/UL (ref 4.2–5.4)
SODIUM SERPL-SCNC: 135 MMOL/L (ref 135–145)
TSH SERPL DL<=0.005 MIU/L-ACNC: 5.32 UIU/ML (ref 0.38–5.33)
WBC # BLD AUTO: 8.2 K/UL (ref 4.8–10.8)

## 2018-07-14 PROCEDURE — 85610 PROTHROMBIN TIME: CPT

## 2018-07-14 PROCEDURE — 700102 HCHG RX REV CODE 250 W/ 637 OVERRIDE(OP): Performed by: EMERGENCY MEDICINE

## 2018-07-14 PROCEDURE — 700111 HCHG RX REV CODE 636 W/ 250 OVERRIDE (IP): Performed by: HOSPITALIST

## 2018-07-14 PROCEDURE — 700117 HCHG RX CONTRAST REV CODE 255: Performed by: HOSPITALIST

## 2018-07-14 PROCEDURE — A9270 NON-COVERED ITEM OR SERVICE: HCPCS | Performed by: HOSPITALIST

## 2018-07-14 PROCEDURE — 72131 CT LUMBAR SPINE W/O DYE: CPT

## 2018-07-14 PROCEDURE — A9270 NON-COVERED ITEM OR SERVICE: HCPCS | Performed by: NURSE PRACTITIONER

## 2018-07-14 PROCEDURE — 73562 X-RAY EXAM OF KNEE 3: CPT | Mod: LT

## 2018-07-14 PROCEDURE — G0378 HOSPITAL OBSERVATION PER HR: HCPCS

## 2018-07-14 PROCEDURE — 700102 HCHG RX REV CODE 250 W/ 637 OVERRIDE(OP): Performed by: NURSE PRACTITIONER

## 2018-07-14 PROCEDURE — 700111 HCHG RX REV CODE 636 W/ 250 OVERRIDE (IP): Performed by: NURSE PRACTITIONER

## 2018-07-14 PROCEDURE — 96372 THER/PROPH/DIAG INJ SC/IM: CPT

## 2018-07-14 PROCEDURE — 700111 HCHG RX REV CODE 636 W/ 250 OVERRIDE (IP): Performed by: EMERGENCY MEDICINE

## 2018-07-14 PROCEDURE — 36415 COLL VENOUS BLD VENIPUNCTURE: CPT

## 2018-07-14 PROCEDURE — A9270 NON-COVERED ITEM OR SERVICE: HCPCS | Performed by: EMERGENCY MEDICINE

## 2018-07-14 PROCEDURE — 62284 INJECTION FOR MYELOGRAM: CPT

## 2018-07-14 PROCEDURE — 96372 THER/PROPH/DIAG INJ SC/IM: CPT | Mod: XU

## 2018-07-14 PROCEDURE — 82962 GLUCOSE BLOOD TEST: CPT

## 2018-07-14 PROCEDURE — 81025 URINE PREGNANCY TEST: CPT

## 2018-07-14 PROCEDURE — 99285 EMERGENCY DEPT VISIT HI MDM: CPT

## 2018-07-14 PROCEDURE — 85730 THROMBOPLASTIN TIME PARTIAL: CPT

## 2018-07-14 PROCEDURE — 84443 ASSAY THYROID STIM HORMONE: CPT

## 2018-07-14 PROCEDURE — 80048 BASIC METABOLIC PNL TOTAL CA: CPT

## 2018-07-14 PROCEDURE — 700101 HCHG RX REV CODE 250: Performed by: HOSPITALIST

## 2018-07-14 PROCEDURE — 85652 RBC SED RATE AUTOMATED: CPT

## 2018-07-14 PROCEDURE — 86140 C-REACTIVE PROTEIN: CPT

## 2018-07-14 PROCEDURE — 72132 CT LUMBAR SPINE W/DYE: CPT

## 2018-07-14 PROCEDURE — 99220 PR INITIAL OBSERVATION CARE,LEVL III: CPT | Performed by: HOSPITALIST

## 2018-07-14 PROCEDURE — 83036 HEMOGLOBIN GLYCOSYLATED A1C: CPT

## 2018-07-14 PROCEDURE — 85025 COMPLETE CBC W/AUTO DIFF WBC: CPT

## 2018-07-14 PROCEDURE — 700102 HCHG RX REV CODE 250 W/ 637 OVERRIDE(OP): Performed by: HOSPITALIST

## 2018-07-14 RX ORDER — HEPARIN SODIUM 5000 [USP'U]/ML
5000 INJECTION, SOLUTION INTRAVENOUS; SUBCUTANEOUS EVERY 8 HOURS
Status: DISCONTINUED | OUTPATIENT
Start: 2018-07-14 | End: 2018-07-16 | Stop reason: HOSPADM

## 2018-07-14 RX ORDER — MORPHINE SULFATE 10 MG/ML
8 INJECTION, SOLUTION INTRAMUSCULAR; INTRAVENOUS ONCE
Status: COMPLETED | OUTPATIENT
Start: 2018-07-14 | End: 2018-07-14

## 2018-07-14 RX ORDER — ONDANSETRON 2 MG/ML
4 INJECTION INTRAMUSCULAR; INTRAVENOUS EVERY 4 HOURS PRN
Status: DISCONTINUED | OUTPATIENT
Start: 2018-07-14 | End: 2018-07-16 | Stop reason: HOSPADM

## 2018-07-14 RX ORDER — CHOLESTYRAMINE 4 G/9G
1 POWDER, FOR SUSPENSION ORAL DAILY
COMMUNITY
End: 2018-11-15

## 2018-07-14 RX ORDER — ZIPRASIDONE HYDROCHLORIDE 80 MG/1
80 CAPSULE ORAL 2 TIMES DAILY
COMMUNITY
End: 2019-01-07 | Stop reason: SDUPTHER

## 2018-07-14 RX ORDER — DEXTROSE MONOHYDRATE 25 G/50ML
25 INJECTION, SOLUTION INTRAVENOUS
Status: DISCONTINUED | OUTPATIENT
Start: 2018-07-14 | End: 2018-07-16 | Stop reason: HOSPADM

## 2018-07-14 RX ORDER — BUSPIRONE HYDROCHLORIDE 10 MG/1
5 TABLET ORAL 2 TIMES DAILY
Status: DISCONTINUED | OUTPATIENT
Start: 2018-07-14 | End: 2018-07-16 | Stop reason: HOSPADM

## 2018-07-14 RX ORDER — BACLOFEN 10 MG/1
10 TABLET ORAL 3 TIMES DAILY
Status: DISCONTINUED | OUTPATIENT
Start: 2018-07-14 | End: 2018-07-14

## 2018-07-14 RX ORDER — TRAZODONE HYDROCHLORIDE 100 MG/1
100 TABLET ORAL
Status: DISCONTINUED | OUTPATIENT
Start: 2018-07-14 | End: 2018-07-16 | Stop reason: HOSPADM

## 2018-07-14 RX ORDER — HYDROCODONE BITARTRATE AND ACETAMINOPHEN 10; 325 MG/1; MG/1
1 TABLET ORAL EVERY 4 HOURS PRN
Status: DISCONTINUED | OUTPATIENT
Start: 2018-07-14 | End: 2018-07-16 | Stop reason: HOSPADM

## 2018-07-14 RX ORDER — ZIPRASIDONE HYDROCHLORIDE 80 MG/1
80 CAPSULE ORAL 2 TIMES DAILY WITH MEALS
Status: DISCONTINUED | OUTPATIENT
Start: 2018-07-14 | End: 2018-07-16 | Stop reason: HOSPADM

## 2018-07-14 RX ORDER — PROMETHAZINE HYDROCHLORIDE 25 MG/1
12.5-25 TABLET ORAL EVERY 4 HOURS PRN
Status: DISCONTINUED | OUTPATIENT
Start: 2018-07-14 | End: 2018-07-16 | Stop reason: HOSPADM

## 2018-07-14 RX ORDER — BACLOFEN 10 MG/1
10 TABLET ORAL 2 TIMES DAILY
COMMUNITY
End: 2018-11-10 | Stop reason: SDUPTHER

## 2018-07-14 RX ORDER — PROMETHAZINE HYDROCHLORIDE 12.5 MG/1
12.5-25 SUPPOSITORY RECTAL EVERY 4 HOURS PRN
Status: DISCONTINUED | OUTPATIENT
Start: 2018-07-14 | End: 2018-07-16 | Stop reason: HOSPADM

## 2018-07-14 RX ORDER — OXYMETAZOLINE HYDROCHLORIDE 0.05 G/100ML
2 SPRAY NASAL ONCE
Status: DISCONTINUED | OUTPATIENT
Start: 2018-07-14 | End: 2018-07-14

## 2018-07-14 RX ORDER — FLUOXETINE HYDROCHLORIDE 20 MG/1
20 CAPSULE ORAL DAILY
Status: DISCONTINUED | OUTPATIENT
Start: 2018-07-14 | End: 2018-07-16 | Stop reason: HOSPADM

## 2018-07-14 RX ORDER — ONDANSETRON 4 MG/1
4 TABLET, ORALLY DISINTEGRATING ORAL ONCE
Status: COMPLETED | OUTPATIENT
Start: 2018-07-14 | End: 2018-07-14

## 2018-07-14 RX ORDER — ONDANSETRON 4 MG/1
4 TABLET, ORALLY DISINTEGRATING ORAL EVERY 4 HOURS PRN
Status: DISCONTINUED | OUTPATIENT
Start: 2018-07-14 | End: 2018-07-16 | Stop reason: HOSPADM

## 2018-07-14 RX ORDER — PREDNISOLONE ACETATE 10 MG/ML
1 SUSPENSION/ DROPS OPHTHALMIC
Status: DISCONTINUED | OUTPATIENT
Start: 2018-07-14 | End: 2018-07-16 | Stop reason: HOSPADM

## 2018-07-14 RX ORDER — GABAPENTIN 600 MG/1
600-1200 TABLET ORAL 2 TIMES DAILY
COMMUNITY
End: 2018-09-07

## 2018-07-14 RX ORDER — POLYETHYLENE GLYCOL 3350 17 G/17G
1 POWDER, FOR SOLUTION ORAL
Status: DISCONTINUED | OUTPATIENT
Start: 2018-07-14 | End: 2018-07-16 | Stop reason: HOSPADM

## 2018-07-14 RX ORDER — PREDNISOLONE ACETATE 10 MG/ML
1 SUSPENSION/ DROPS OPHTHALMIC
COMMUNITY
Start: 2018-07-05 | End: 2018-08-09

## 2018-07-14 RX ORDER — METHOCARBAMOL 750 MG/1
750 TABLET, FILM COATED ORAL ONCE
Status: COMPLETED | OUTPATIENT
Start: 2018-07-14 | End: 2018-07-14

## 2018-07-14 RX ORDER — PREDNISONE 20 MG/1
20 TABLET ORAL 2 TIMES DAILY WITH MEALS
Status: DISCONTINUED | OUTPATIENT
Start: 2018-07-14 | End: 2018-07-16 | Stop reason: HOSPADM

## 2018-07-14 RX ORDER — BISACODYL 10 MG
10 SUPPOSITORY, RECTAL RECTAL
Status: DISCONTINUED | OUTPATIENT
Start: 2018-07-14 | End: 2018-07-16 | Stop reason: HOSPADM

## 2018-07-14 RX ORDER — AMOXICILLIN 250 MG
2 CAPSULE ORAL 2 TIMES DAILY
Status: DISCONTINUED | OUTPATIENT
Start: 2018-07-14 | End: 2018-07-16 | Stop reason: HOSPADM

## 2018-07-14 RX ORDER — ZOLPIDEM TARTRATE 5 MG/1
5 TABLET ORAL NIGHTLY PRN
Status: DISCONTINUED | OUTPATIENT
Start: 2018-07-14 | End: 2018-07-16 | Stop reason: HOSPADM

## 2018-07-14 RX ORDER — GABAPENTIN 300 MG/1
600 CAPSULE ORAL 3 TIMES DAILY
Status: DISCONTINUED | OUTPATIENT
Start: 2018-07-14 | End: 2018-07-16 | Stop reason: HOSPADM

## 2018-07-14 RX ADMIN — BUSPIRONE HYDROCHLORIDE 5 MG: 10 TABLET ORAL at 16:37

## 2018-07-14 RX ADMIN — ONDANSETRON 4 MG: 4 TABLET, ORALLY DISINTEGRATING ORAL at 04:05

## 2018-07-14 RX ADMIN — METHOCARBAMOL 750 MG: 750 TABLET ORAL at 04:05

## 2018-07-14 RX ADMIN — HYDROCODONE BITARTRATE AND ACETAMINOPHEN 1 TABLET: 10; 325 TABLET ORAL at 06:48

## 2018-07-14 RX ADMIN — SITAGLIPTIN 100 MG: 100 TABLET, FILM COATED ORAL at 08:43

## 2018-07-14 RX ADMIN — PREDNISOLONE ACETATE 1 DROP: 10 SUSPENSION/ DROPS OPHTHALMIC at 17:52

## 2018-07-14 RX ADMIN — PREDNISONE 20 MG: 20 TABLET ORAL at 16:43

## 2018-07-14 RX ADMIN — ZIPRASIDONE HYDROCHLORIDE 80 MG: 80 CAPSULE ORAL at 16:38

## 2018-07-14 RX ADMIN — ONDANSETRON 4 MG: 4 TABLET, ORALLY DISINTEGRATING ORAL at 10:02

## 2018-07-14 RX ADMIN — HYDROCODONE BITARTRATE AND ACETAMINOPHEN 1 TABLET: 10; 325 TABLET ORAL at 20:46

## 2018-07-14 RX ADMIN — GABAPENTIN 600 MG: 300 CAPSULE ORAL at 16:43

## 2018-07-14 RX ADMIN — ZIPRASIDONE HYDROCHLORIDE 80 MG: 80 CAPSULE ORAL at 10:01

## 2018-07-14 RX ADMIN — TRAZODONE HYDROCHLORIDE 100 MG: 100 TABLET ORAL at 20:46

## 2018-07-14 RX ADMIN — IOHEXOL 12 ML: 180 INJECTION INTRAVENOUS at 15:15

## 2018-07-14 RX ADMIN — STANDARDIZED SENNA CONCENTRATE AND DOCUSATE SODIUM 2 TABLET: 8.6; 5 TABLET, FILM COATED ORAL at 16:37

## 2018-07-14 RX ADMIN — HEPARIN SODIUM 5000 UNITS: 5000 INJECTION, SOLUTION INTRAVENOUS; SUBCUTANEOUS at 20:46

## 2018-07-14 RX ADMIN — ASPIRIN 81 MG: 81 TABLET, COATED ORAL at 06:48

## 2018-07-14 RX ADMIN — BUSPIRONE HYDROCHLORIDE 5 MG: 10 TABLET ORAL at 08:42

## 2018-07-14 RX ADMIN — HYDROCODONE BITARTRATE AND ACETAMINOPHEN 1 TABLET: 10; 325 TABLET ORAL at 12:37

## 2018-07-14 RX ADMIN — PREDNISOLONE ACETATE 1 DROP: 10 SUSPENSION/ DROPS OPHTHALMIC at 16:35

## 2018-07-14 RX ADMIN — HYDROCODONE BITARTRATE AND ACETAMINOPHEN 1 TABLET: 10; 325 TABLET ORAL at 16:36

## 2018-07-14 RX ADMIN — PREDNISONE 20 MG: 20 TABLET ORAL at 10:01

## 2018-07-14 RX ADMIN — FLUOXETINE HYDROCHLORIDE 20 MG: 20 CAPSULE ORAL at 08:43

## 2018-07-14 RX ADMIN — HEPARIN SODIUM 5000 UNITS: 5000 INJECTION, SOLUTION INTRAVENOUS; SUBCUTANEOUS at 06:54

## 2018-07-14 RX ADMIN — GABAPENTIN 600 MG: 300 CAPSULE ORAL at 12:37

## 2018-07-14 RX ADMIN — GABAPENTIN 600 MG: 300 CAPSULE ORAL at 08:43

## 2018-07-14 RX ADMIN — MORPHINE SULFATE 8 MG: 10 INJECTION INTRAVENOUS at 04:05

## 2018-07-14 RX ADMIN — STANDARDIZED SENNA CONCENTRATE AND DOCUSATE SODIUM 2 TABLET: 8.6; 5 TABLET, FILM COATED ORAL at 06:48

## 2018-07-14 RX ADMIN — PREDNISOLONE ACETATE 1 DROP: 10 SUSPENSION/ DROPS OPHTHALMIC at 20:45

## 2018-07-14 ASSESSMENT — ENCOUNTER SYMPTOMS
DOUBLE VISION: 0
MYALGIAS: 0
BLURRED VISION: 0
FEVER: 0
HALLUCINATIONS: 0
HEARTBURN: 0
SHORTNESS OF BREATH: 0
SENSORY CHANGE: 1
HEMOPTYSIS: 0
ABDOMINAL PAIN: 0
FOCAL WEAKNESS: 1
DEPRESSION: 0
WEAKNESS: 1
DIZZINESS: 0
FLANK PAIN: 0
SPEECH CHANGE: 0
NAUSEA: 0
CHILLS: 0
COUGH: 0
PALPITATIONS: 0
EYE DISCHARGE: 0
BRUISES/BLEEDS EASILY: 0
VOMITING: 0

## 2018-07-14 ASSESSMENT — PAIN SCALES - GENERAL
PAINLEVEL_OUTOF10: 7
PAINLEVEL_OUTOF10: 10
PAINLEVEL_OUTOF10: 2
PAINLEVEL_OUTOF10: 4
PAINLEVEL_OUTOF10: 7
PAINLEVEL_OUTOF10: 9
PAINLEVEL_OUTOF10: 3
PAINLEVEL_OUTOF10: 2

## 2018-07-14 ASSESSMENT — PATIENT HEALTH QUESTIONNAIRE - PHQ9
1. LITTLE INTEREST OR PLEASURE IN DOING THINGS: NOT AT ALL
2. FEELING DOWN, DEPRESSED, IRRITABLE, OR HOPELESS: NOT AT ALL
SUM OF ALL RESPONSES TO PHQ9 QUESTIONS 1 AND 2: 0

## 2018-07-14 ASSESSMENT — LIFESTYLE VARIABLES
DO YOU DRINK ALCOHOL: NO
SUBSTANCE_ABUSE: 0

## 2018-07-14 NOTE — PROGRESS NOTES
Pt arrived on the unit A&Ox4. Pt is unable to get up from the bed per her as she says she cant walk. Pt has urinary urgency and needs to use the bedpan every 20-30 minutes. Pt is on 1L NC but says at home she is on 3L. At this time pt does not require 3L. POC reviewed and call light within reach.

## 2018-07-14 NOTE — PROGRESS NOTES
Received from ER to  via [facility] with LLE weakness to flaccid, with some foot drop and paresthesia noted, c/o back pain aggravated with LLE movement, POC discussed, LP and CT ordered, will order coags for LP, needs attended.

## 2018-07-14 NOTE — ED TRIAGE NOTES
Chief Complaint   Patient presents with   • Back Pain     sudden onset of low back pain w/ shooting pain, caused Pt to fall   • T-5000 GLF     -LOC, did not strike head, fell to knees, no knee trauma noted      Pt to yellow 65 in wheelchair. Pt BIB EMS to triage in wheelchair for above, Pt is unable to stand at this time due to the pain. Pt appears uncomfortable but not in distress. Pt reports sudden onset of pain radiated down left leg when it occurred. Pt has hx of disc replacement and fusion.

## 2018-07-14 NOTE — ED NOTES
Med rec complete per patient medication list at bedside  Allergies reviewed    Patient just started Prednisone and had only 1 dose

## 2018-07-14 NOTE — ED NOTES
Pt is being transferred at this time. Pt is stable. Night shift rn gave report to nurse. Pt was stable at time of discharge.
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
## 2018-07-14 NOTE — ED PROVIDER NOTES
"ED Provider Note    Scribed for Junie Saldana M.D. by Isela Patel. 7/14/2018, 3:37 AM.    Primary care provider: Torres Brody M.D.  Means of arrival: wheel chair and EMS  History obtained from: Patient  History limited by: none     CHIEF COMPLAINT  Chief Complaint   Patient presents with   • Back Pain     sudden onset of low back pain w/ shooting pain, caused Pt to fall   • T-5000 GLF     -LOC, did not strike head, fell to knees, no knee trauma noted        HPI  Kristin Balderrama is a 27 y.o. female with a history of asthma, back pain, hypertension, falls, mitochondrial disease, multiple personality disorder who presents to the Emergency Department via EMS for evaluation of severe low back pain onset several days ago. Patient reports experiencing 3 ground level falls today falling to her knees secondary to her pain and her legs giving out. Patient reports associated left knee pain and nausea. She did not hit her head. Patient describes her back pain as a tingling sensation and that it is sharp in nature. Her pain radiates down her left leg. Patient's back pain is exacerbated with ambulating. Patient is followed by Dr. Gutierrez, her primary care physician, who she saw today and was prescribed Vicoden for pain control. She has taken Vicoden and has tried smoking medical marijuana without relief. Additionally, patient reports straining her back secondary to bending down last week. Her last back surgery was in 2012. Patient reports she is \"constantly leaking urine\" as well. No complaints of dysuria, loss of consciousness and abdominal pain.       REVIEW OF SYSTEMS  Positives as above. Pertinent negatives include dysuria, loss of consciousness, abdominal pain  All other review of systems are negative  C    PAST MEDICAL HISTORY   has a past medical history of Abdominal pain; Anginal syndrome; Apnea, sleep; Arrhythmia; Arthritis; ASTHMA; Atrial fibrillation (HCC); Back pain; Borderline personality disorder; Breath " shortness; Cardiac arrhythmia; Chickenpox; Chronic UTI (9/18/2010); Cough; Daytime sleepiness; Depression; Diabetes (HCC); Diarrhea; Disorder of thyroid; Fall; Fatigue; Frequent headaches; Gasping for breath; Gynecological disorder; Headache(784.0); Heart burn; History of falling; Hypertension; Migraine; 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HCC); Multiple personality disorder; Nausea; Obesity; Pain (08-15-12); Painful joint; PCOS (polycystic ovarian syndrome); Pneumonia (2012); Psychosis; Renal disorder; Ringing in ears; Scoliosis; Shortness of breath; Sinus tachycardia (10/31/2013); Sleep apnea; Snoring; Tonsillitis; Tuberculosis; Urinary bladder disorder; Urinary incontinence; Weakness; and Wears glasses.    SURGICAL HISTORY   has a past surgical history that includes neuro dest facet l/s w/ig sngl (4/21/2015); recovery (1/27/2016); delmar by laparoscopy (8/29/2010); lumbar fusion anterior (8/21/2012); other cardiac surgery (1/2016); tonsillectomy; bowel stimulator insertion (7/13/2016); gastroscopy with balloon dilatation (N/A, 1/4/2017); muscle biopsy (Right, 1/26/2017); other abdominal surgery; and laminotomy.    SOCIAL HISTORY  Social History   Substance Use Topics   • Smoking status: Never Smoker   • Smokeless tobacco: Never Used   • Alcohol use No      History   Drug Use   • Types: Marijuana     Comment: edibles       FAMILY HISTORY  Family History   Problem Relation Age of Onset   • Hypertension Mother    • Sleep Apnea Mother    • Heart Disease Mother    • Other Mother      hypothryod   • Hypertension Maternal Uncle    • Heart Disease Maternal Grandmother    • Hypertension Maternal Grandmother    • No Known Problems Sister    • Other Sister      Narcolepsy;fibromyalsia;bone;nerve   • Genitourinary () Sister      endometriosis       CURRENT MEDICATIONS  Reviewed. See Encounter Summary.     ALLERGIES  Allergies   Allergen Reactions   • Cefdinir Shortness of Breath and Itching     Tolerated 1/18/17   • Depakote [Divalproex  "Sodium] Unspecified     Muscle spasms/muscle pain and weakness     • Doxycycline Anaphylaxis and Vomiting     RXN=unknown   • Abilify Unspecified     Headaches/muscle twitching     • Amitriptyline Unspecified     Headaches     • Amoxicillin Rash     Pt states \"I get a rash\".     • Ciprofloxacin Rash     Pt states \"I get a rash\".     • Clindamycin Nausea     Even with food     • Ees [Erythromycin] Vomiting and Nausea   • Flagyl [Metronidazole Hcl] Unspecified     \"eye problems\"     • Flomax [Tamsulosin Hydrochloride] Swelling   • Metformin Unspecified     Increased lactic acid      • Sulfa Drugs Hives and Rash     RXN=since childhood   • Tape Rash     Tears skin off   coban with Tegaderm tape ok  RXN=ongoing   • Vancomycin Itching     Pt becomes flushed in face and chest.   RXN=7/10/16   • Wound Dressing Adhesive Hives     By pt report   • Cephalexin [Keflex] Rash     Pt states she gets a rash with this medication   • Erythromycin Rash     .   • Levofloxacin Unspecified     Leg muscle cramps   • Metronidazole Rash     .   • Valproic Acid Rash     .       PHYSICAL EXAM  VITAL SIGNS: /81   Pulse 81   Temp 36.6 °C (97.9 °F) (Temporal)   Resp 16   Ht 1.651 m (5' 5\")   Wt 122.5 kg (270 lb)   SpO2 98%   BMI 44.93 kg/m²    Pulse ox interpretation: I interpret this pulse ox as normal.  Constitutional: Alert in no apparent distress.  HENT: Normocephalic atraumatic, MMM  Eyes: PERR, Conjunctiva normal, Non-icteric.   Neck: Normal range of motion, No tenderness, Supple, No stridor.   Lymphatic: No lymphadenopathy noted.   Cardiovascular: Regular rate and rhythm, no murmurs.   Thorax & Lungs: Normal breath sounds, No respiratory distress, No wheezing, No chest tenderness.   Abdomen: Bowel sounds normal, Soft, No tenderness, No pulsatile masses. No peritoneal signs.  Skin: Warm, Dry, No erythema, No rash.   Back: Midline lumbar tenderness, left SI tenderness, paraspinal muscle tenderness. No step offs or swelling. "   Extremities: Intact distal pulses, No edema, left knee tenderness, left patella is midline, no effusion noted. No cyanosis  Musculoskeletal: Left knee tenderness, left patella is midline, no effusion noted.  Neurologic: Alert and oriented, No focal deficits noted. 4/5 strength to the left lower extremity, 5/5 strength to the right.     DIFFERENTIAL DIAGNOSIS AND WORK UP PLAN    3:37 AM Patient seen and examined at bedside. The patient presents with low back pain and the differential diagnosis includes but is not limited to Strain, worsening disk disease, radiculopathy, muscle spasm, for the left knee: contusion vs fracture. Ordered for CT L spine, DX left knee, accu chek glucose to evaluate. Patient will be treated with morphine 8 mg, Robaxin 750 mg, Zofran ODT 4 mg for her symptoms.     DIAGNOSTIC STUDIES / PROCEDURES     LABS  Labs Reviewed   ACCU-CHEK GLUCOSE - Abnormal; Notable for the following:        Result Value    Glucose - Accu-Ck 176 (*)     All other components within normal limits   CBC WITH DIFFERENTIAL   BASIC METABOLIC PANEL   WESTERGREN SED RATE   CRP QUANTITIVE (NON-CARDIAC)   CBC WITH DIFFERENTIAL   TSH   HEMOGLOBIN A1C   POC URINE PREGNANCY     All labs were reviewed by me.    RADIOLOGY  CT-LSPINE W/O PLUS RECONS   Final Result         1.  No acute traumatic bony injury of the lumbar spine.   2.  Mild bilateral neural foraminal narrowing at L5/S1 due to facet arthrosis.   3.  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 Recommend surgical referral for further workup and management.      These findings were discussed with the patient's clinician, LUANA NEGRON, on 7/14/2018 5:17 AM.      DX-KNEE 3 VIEWS LEFT   Final Result         1.  No acute traumatic bony injury.        The radiologist's interpretation of all radiological studies have been reviewed by me.    COURSE & MEDICAL DECISION MAKING  Pertinent Labs & Imaging studies reviewed. (See  "chart for details)    5:32 AM- Reviewed the patient's lab and imaging results. 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     5:39 AM- Patient was reevaluated at bedside. Discussed lab and radiology results with the patient. Nursing staff attempted to ambulate the patient, however, her left leg kept giving out and she was unable to do so. Patient will be admitted for pain control and inability to ambulate.     6:00 AM- I discussed the patient's case and the above findings with Dr. Keith (hosptialist) who accepts the patient for admission.      Her vitals are: /81   Pulse 81   Temp 36.6 °C (97.9 °F) (Temporal)   Resp 16   Ht 1.651 m (5' 5\")   Wt 122.5 kg (270 lb)   SpO2 98%   BMI 44.93 kg/m²     This is a 27 y.o. year old female who presents with acute on chronic lower back pain rating down the left leg.  I know that she has a mitochondrial disorder however this does not seem to be worsening of a neurologic disorder at this time however she does have some mild weakness on the left greater than the right but it is associated with his left lower back pain.  The patient's CT scan is not showing any acute or worsening signs of bony injury   Patient cannot tolerate an MRI due to her stim machine but at this time she is unable to ambulate even regardless of 8 mg of morphine IM.  She will be admitted for physical therapy and further evaluation    DISPOSITION:  Patient will be admitted to Dr. Keith (hosptialist) in guarded condition.    FINAL IMPRESSION  1. Acute low back pain with left sided sciatica  2. Acute on chronic low back pain  3. Inability to ambulate     Isela RODRIGUEZ (Eva), am scribing for, and in the presence of, Junie Saldana M.D..    Electronically signed by: Isela Patel (Eva), 7/14/2018    Junie RODRIGUEZ M.D. personally performed the services described in this documentation, as scribed by Isela Patel in my presence, and it is both " accurate and complete.    The note accurately reflects work and decisions made by me.  Junie Saldana  7/14/2018  6:31 AM    This dictation has been created using voice recognition software and/or scribes. The accuracy of the dictation is limited by the abilities of the software and the expertise of the scribes. I expect there may be some errors of grammar and possibly content. I made every attempt to manually correct the errors within my dictation. However, errors related to voice recognition software and/or scribes may still exist and should be interpreted within the appropriate context.

## 2018-07-14 NOTE — ED NOTES
Pt attempted to stand and go to the restroom, pt could not bear weight onto left leg. All results posted chart up for re evaluation

## 2018-07-14 NOTE — ED TRIAGE NOTES
"Chief Complaint   Patient presents with   • Back Pain     sudden onset of low back pain w/ shooting pain, caused Pt to fall   • T-5000 GLF     -LOC, did not strike head, fell to knees, no knee trauma noted      Pt BIB EMS to triage in wheelchair for above, Pt is unable to stand at this time due to the pain. Pt appears uncomfortable but not in distress. Pt reports sudden onset of pain radiated down left leg when it occurred. Pt has hx of disc replacement and fusion. Pt placed back in lobby at this time.    Blood pressure 123/81, pulse 81, temperature 36.6 °C (97.9 °F), temperature source Temporal, resp. rate 16, height 1.651 m (5' 5\"), weight 122.5 kg (270 lb), SpO2 98 %.      "

## 2018-07-14 NOTE — H&P
Hospital Medicine History and Physical    Date of Service  7/14/2018    Chief Complaint  Chief Complaint   Patient presents with   • Back Pain     sudden onset of low back pain w/ shooting pain, caused Pt to fall   • T-5000 GLF     -LOC, did not strike head, fell to knees, no knee trauma noted        History of Presenting Illness  27 y.o. female who presented 7/14/2018 with back pain.  Patient has a history of asthma back pain hypertension falls mitochondrial disease multiple personality disorder presents with evaluation of severe low back pain.  She has been having multiple ground-level falls today secondary to her left leg giving out.  She had a back injury several days ago and she develops back pain with radiation down the left leg.  She is also complaining of left leg weakness tingling no numbness.  She states at baseline she has bowel and bladder incontinence this is unchanged.  She denies any fever chills or sweats.  She does use marijuana without relief.  She feels that she strained her back secondary to bending down last week.  No IV drug use.  No known alleviating or exacerbating factors to her symptoms.    Primary Care Physician  Torres Brody M.D.    Consultants  none    Code Status  full    Review of Systems  Review of Systems   Constitutional: Positive for malaise/fatigue. Negative for chills and fever.   HENT: Negative for congestion, hearing loss and tinnitus.    Eyes: Negative for blurred vision, double vision and discharge.   Respiratory: Negative for cough, hemoptysis and shortness of breath.    Cardiovascular: Negative for chest pain, palpitations and leg swelling.   Gastrointestinal: Negative for abdominal pain, heartburn, nausea and vomiting.   Genitourinary: Negative for dysuria and flank pain.   Musculoskeletal: Negative for joint pain and myalgias.   Skin: Negative for rash.   Neurological: Positive for sensory change, focal weakness and weakness. Negative for dizziness and speech change.  "  Endo/Heme/Allergies: Negative for environmental allergies. Does not bruise/bleed easily.   Psychiatric/Behavioral: Negative for depression, hallucinations and substance abuse.        Past Medical History  Past Medical History:   Diagnosis Date   • Sinus tachycardia 10/31/2013   • Pain 08-15-12    back, 7/10   • Pneumonia 2012   • Chronic UTI 9/18/2010   • Abdominal pain    • Anginal syndrome     random chest pain especially with tachycardia   • Apnea, sleep    • Arrhythmia     \"sinus tachycardia\", cariologist, Dr. Kumar; ablation 2/2016   • Arthritis     osteo   • ASTHMA     when around smoke   • Atrial fibrillation (HCC)    • Back pain    • Borderline personality disorder    • Breath shortness     with tachycardia   • Cardiac arrhythmia    • Chickenpox    • Cough    • Daytime sleepiness    • Depression    • Diabetes (HCC)    • Diarrhea    • Disorder of thyroid    • Fall    • Fatigue    • Frequent headaches    • Gasping for breath    • Gynecological disorder     PCOS   • Headache(784.0)    • Heart burn    • History of falling    • Hypertension    • Migraine    • 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HCC)    • Multiple personality disorder    • Nausea    • Obesity    • Painful joint    • PCOS (polycystic ovarian syndrome)    • Psychosis    • Renal disorder     \"kidney disease, stage 1\" nephrologist, Dr. 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   • Urinary bladder disorder     Suprapubic cath   • Urinary incontinence    • Weakness    • Wears glasses        Surgical History  Past Surgical History:   Procedure Laterality Date   • MUSCLE BIOPSY Right 1/26/2017    Procedure: MUSCLE BIOPSY - THIGH;  Surgeon: Isidro Vigil M.D.;  Location: SURGERY [facility];  Service:    • GASTROSCOPY WITH BALLOON DILATATION N/A 1/4/2017    Procedure: " GASTROSCOPY WITH DILATATION;  Surgeon: Torres Vargas M.D.;  Location: SURGERY [facility];  Service:    • BOWEL STIMULATOR INSERTION  7/13/2016    Procedure: BOWEL STIMULATOR INSERTION FOR PERMANENT INTERSTIM SACRAL IMPLANT;  Surgeon: Joe Noyola M.D.;  Location: SURGERY [facility];  Service:    • RECOVERY  1/27/2016    Procedure: CATH LAB EP STUDY WITH SINUS NODE MODIFICATION ABHINAV;  Surgeon: [facility];  [place] [facility] [place]  Service:    • OTHER CARDIAC SURGERY  1/2016    cardiac ablation   • NEURO DEST FACET L/S W/IG SNGL  4/21/2015    Performed by Reza Tabor at SURGERY [facility]   • LUMBAR FUSION ANTERIOR  8/21/2012    Performed by SUSIE SAWANT at SURGERY [facility]   • ALYSSA BY LAPAROSCOPY  8/29/2010    Performed by SHAYY JOHNS at SURGERY [facility]S   • LAMINOTOMY     • OTHER ABDOMINAL SURGERY     • TONSILLECTOMY      tonsillectomy       Medications  No current facility-administered medications on file prior to encounter.      Current Outpatient Prescriptions on File Prior to Encounter   Medication Sig Dispense Refill   • predniSONE (DELTASONE) 20 MG Tab Take 1 Tab by mouth 2 times a day, with meals for 5 days. 10 Tab 0   • HYDROcodone/acetaminophen (NORCO)  MG Tab Take 1 Tab by mouth every four hours as needed for Severe Pain for up to 7 days. 40 Tab 0   • zolpidem (AMBIEN) 5 MG Tab Take 1 Tab by mouth at bedtime as needed for Sleep for up to 2 doses. 2 Tab 0   • Cholecalciferol (VITAMIN D3) 80252 units Cap Take 1 Each by mouth every 7 days. 4 Cap 5   • SITagliptin (JANUVIA) 100 MG Tab Take 1 Tab by mouth every day. 30 Tab 3   • clindamycin (CLEOCIN) 300 MG Cap Take 1 Cap by mouth 3 times a day. 30 Cap 0   • promethazine (PHENERGAN) 25 MG Suppos Insert 1 Suppository in rectum every 6 hours as needed for Nausea/Vomiting. 10 Suppository 0   • Blood Glucose Monitoring Suppl Supplies Misc Test strips order: Test strips for accucheck  meter. Sig: use qday 50 Each 11   • ondansetron (ZOFRAN ODT) 4 MG TABLET DISPERSIBLE Take 1 Tab by mouth every 8 hours as needed. 12 Tab 0   • liraglutide (VICTOZA) 18 MG/3ML Solution Pen-injector injection Inject 0.3 mL as instructed every day. 3 PEN 6   • traZODone (DESYREL) 100 MG Tab Take 1 Tab by mouth every bedtime. 90 Tab 3   • busPIRone (BUSPAR) 5 MG Tab Take 1 Tab by mouth 2 times a day. 60 Tab 5   • mupirocin (BACTROBAN) 2 % Ointment Apply BID to infected sores x 1week 30 g 0   • nystatin/triamcinolone (MYCOLOG) 618186-6.1 UNIT/GM-% Cream Apply TID to itchy area under breasts x 1week 45 g 0   • ivabradine (CORLANOR) 5 MG Tab tablet Take 1 Tab by mouth 2 times a day, with meals. 60 Tab 11   • baclofen (LIORESAL) 10 MG Tab Take 1 Tab by mouth 3 times a day. 90 Tab 5   • sodium bicarbonate (SODIUM BICARBONATE) 650 MG Tab Take 650 mg by mouth 3 times a day.     • non-formulary med Take 15 mL by mouth every 4 hours. CBD Oil/Sugar - every 4 hours for chornic pain     • fluoxetine (PROZAC) 10 MG Cap TAKE ONE CAPSULE BY MOUTH ONCE A DAY 30 Cap 11   • ziprasidone (GEODON) 80 MG Cap TAKE 2 CAPSULES BY MOUTH NIGHTLY AT BEDTIME 60 Cap 11   • fesoterodine fumarate (TOVIAZ) 4 MG TABLET SR 24 HR Take 1 Tab by mouth every day. 90 Tab 3   • albuterol 108 (90 Base) MCG/ACT Aero Soln inhalation aerosol Inhale 2 Puffs by mouth every 6 hours as needed for Shortness of Breath. 8.5 g 1   • gabapentin (NEURONTIN) 600 MG tablet Take 1 Tab by mouth 3 times a day. 270 Tab 3   • lactulose 10 GM/15ML Solution Take 10 g by mouth 2 times a day as needed (For constipation).     • aspirin EC (ECOTRIN) 81 MG Tablet Delayed Response Take 1 Tab by mouth every day. 30 Tab 6   • Melatonin 5 MG Tab Take 10 mg by mouth every bedtime.         Family History  Family History   Problem Relation Age of Onset   • Hypertension Mother    • Sleep Apnea Mother    • Heart Disease Mother    • Other Mother      hypothryod   • Hypertension Maternal Uncle    •  "Heart Disease Maternal Grandmother    • Hypertension Maternal Grandmother    • No Known Problems Sister    • Other Sister      Narcolepsy;fibromyalsia;bone;nerve   • Genitourinary () Sister      endometriosis       Social History  Social History   Substance Use Topics   • Smoking status: Never Smoker   • Smokeless tobacco: Never Used   • Alcohol use No       Allergies  Allergies   Allergen Reactions   • Cefdinir Shortness of Breath and Itching     Tolerated 17   • Depakote [Divalproex Sodium] Unspecified     Muscle spasms/muscle pain and weakness     • Doxycycline Anaphylaxis and Vomiting     RXN=unknown   • Abilify Unspecified     Headaches/muscle twitching     • Amitriptyline Unspecified     Headaches     • Amoxicillin Rash     Pt states \"I get a rash\".     • Ciprofloxacin Rash     Pt states \"I get a rash\".     • Clindamycin Nausea     Even with food     • Ees [Erythromycin] Vomiting and Nausea   • Flagyl [Metronidazole Hcl] Unspecified     \"eye problems\"     • Flomax [Tamsulosin Hydrochloride] Swelling   • Metformin Unspecified     Increased lactic acid      • Sulfa Drugs Hives and Rash     RXN=since childhood   • Tape Rash     Tears skin off   coban with Tegaderm tape ok  RXN=ongoing   • Vancomycin Itching     Pt becomes flushed in face and chest.   RXN=7/10/16   • Wound Dressing Adhesive Hives     By pt report   • Cephalexin [Keflex] Rash     Pt states she gets a rash with this medication   • Erythromycin Rash     .   • Levofloxacin Unspecified     Leg muscle cramps   • Metronidazole Rash     .   • Valproic Acid Rash     .        Physical Exam  Laboratory   Hemodynamics  Temp (24hrs), Av.6 °C (97.9 °F), Min:36.6 °C (97.9 °F), Max:36.6 °C (97.9 °F)   Temperature: 36.6 °C (97.9 °F)  Pulse  Av  Min: 81  Max: 81    Blood Pressure: 123/81      Respiratory      Respiration: 16, Pulse Oximetry: 98 %             Physical Exam   Constitutional: She is oriented to person, place, and time. She appears " well-developed and well-nourished. No distress.   HENT:   Head: Normocephalic and atraumatic.   Eyes: Conjunctivae and EOM are normal. Pupils are equal, round, and reactive to light.   Neck: Normal range of motion. Neck supple. No JVD present.   Cardiovascular: Normal rate, regular rhythm, normal heart sounds and intact distal pulses.    No murmur heard.  Pulmonary/Chest: Effort normal and breath sounds normal. No respiratory distress. She has no wheezes.   Abdominal: Soft. Bowel sounds are normal. She exhibits no distension. There is no tenderness.   Musculoskeletal: Normal range of motion. She exhibits no edema.   Left leg 3-4/5 strength    Neurological: She is alert and oriented to person, place, and time. She displays normal reflexes. She exhibits normal muscle tone.   Sensory change per patient on left leg   Skin: Skin is warm and dry.   Psychiatric: She has a normal mood and affect. Her behavior is normal. Judgment and thought content normal.   Nursing note and vitals reviewed.      Recent Labs      07/14/18   0556   WBC  8.2   RBC  5.06   HEMOGLOBIN  15.9   HEMATOCRIT  45.7   MCV  90.3   MCH  31.4   MCHC  34.8   RDW  40.6   PLATELETCT  239   MPV  11.3         No results for input(s): ALTSGPT, ASTSGOT, ALKPHOSPHAT, TBILIRUBIN, DBILIRUBIN, GAMMAGT, AMYLASE, LIPASE, ALB, PREALBUMIN, GLUCOSE in the last 72 hours.              Lab Results   Component Value Date    TROPONINI <0.01 03/27/2017     Urinalysis:    Lab Results  Component Value Date/Time   SPECGRAVITY 1.028 06/22/2018 1037   GLUCOSEUR Negative 06/22/2018 1037   KETONES Trace (A) 06/22/2018 1037   NITRITE Negative 06/22/2018 1037   WBCURINE  (A) 06/22/2018 1037   RBCURINE 2-5 (A) 06/22/2018 1037   BACTERIA Negative 06/22/2018 1037   EPITHELCELL Few 06/22/2018 1037        Imaging  CT-LSPINE W/O PLUS RECONS [[id number]] Resulted: 07/14/18 0517   Order Status: Completed Updated: 07/14/18 0519   Narrative:       7/14/2018 4:52 AM    HISTORY/REASON FOR  EXAM: Atraumatic Pain/Radiculopathy    TECHNIQUE/EXAM DESCRIPTION:  CT of the lumbar spine without contrast, with reconstructions.    Transaxial scans of the lumbar spine without IV contrast. Sagittal reconstruction was performed.    Low dose optimization technique was utilized for this CT exam including automated exposure control and adjustment of the mA and/or kV according to patient size.    COMPARISON: None    FINDINGS:    There is normal alignment of the lumbar spine. No fracture or listhesis is identified. Vertebral body height is well maintained. There is anterior lumbar spine fusion seen at L4/L5. Facet arthrosis of L5/S1 is seen bilaterally, greater on the left   resulting in mild bilateral neural foraminal narrowing.    The prevertebral soft tissues and paraspinal soft tissues appear within normal limits.   The visualized thoracic and abdominal soft tissues appear within physiologic limits.    There is 2.5 x 2.1 cm masslike structure identified at the tip of the appendix.   Impression:         1.  No acute traumatic bony injury of the lumbar spine.  2.  Mild bilateral neural foraminal narrowing at L5/S1 due to facet arthrosis.  3.  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 Recommend surgical referral for further workup and management.    These findings were discussed with the patient's clinician, LUANA NEGRON, on 7/14/2018 5:17 AM.   DX-KNEE 3 VIEWS LEFT [[id number]] Resulted: 07/14/18 0447   Order Status: Completed Updated: 07/14/18 0449   Narrative:       7/14/2018 4:18 AM    HISTORY/REASON FOR EXAM: Pain/Deformity Following Trauma    TECHNIQUE/EXAM DESCRIPTION:  AP, lateral, and oblique views of the LEFT knee.    COMPARISON:  None.    FINDINGS:    The bony structures and articulations appear within normal limits without visualized fracture, subluxation, or dislocation.   Impression:         1.  No acute traumatic bony injury.        Assessment/Plan      I anticipate this patient is appropriate for observation status at this time.    * Left leg weakness   Assessment & Plan    This patient has had multiple similar complaints previously however she states this weakness is different than her normal symptoms. She is unable to bear weight 2/2 to weakness.  she has had an injury which is unable to rule out nerve compression or cauda equina given her symptoms. she has baseline bladder and urinary incontinence ordering a bladder scan to rule out possible urinary retention which would further suggest possible cauda equina.  She potentially could have disc herniation difficult to determine as she is unable to get MRI secondary to spinal stimulator.  This was discussed with Dr. Ferrell neurosurgery on call, will order CT myelogram if abnormal will need neurosurgery consultation. ESR/CRP pending.  She has been seen by neuro multiple times, but difficult to r/o compression at this time.   PT/OT eval  When clinically appropriate        TONYA (obstructive sleep apnea)- (present on admission)   Assessment & Plan    CPAP ordered         Morbid obesity with BMI of 45.0-49.9, adult ([facility])- (present on admission)   Assessment & Plan    Encouraged weight loss        Type 2 diabetes mellitus without complication, without long-term current use of insulin ([facility])- (present on admission)   Assessment & Plan    Check SSI   A1c  Resume januvia         Bowel and bladder incontinence- (present on admission)   Assessment & Plan    She does state she has bowel and bladder incontinence   Check bladder scan        GERD (gastroesophageal reflux disease)- (present on admission)   Assessment & Plan    Stable; monitor         Acquired hypothyroidism- (present on admission)   Assessment & Plan    Recheck tsh, not on levothyroxine         Progressive focal motor weakness- (present on admission)   Assessment & Plan    Hx of mitochondrial disease vs haschimotos disease?   Recheck tsh   Has improved in the past  with steroids will trial on solumedrol dose per previous recs   Would consider neuro consult         Anxiety- (present on admission)   Assessment & Plan    Resume home buspar/prozac        Borderline personality disorder in adult- (present on admission)   Assessment & Plan    Hx of             VTE prophylaxis: heparin

## 2018-07-14 NOTE — ASSESSMENT & PLAN NOTE
In-hospital FSBG goal less than 180 mg/dL  SSI qAC & qHS when eating, q6 when NPO  Glucose - Accu-Ck   Date/Time Value Ref Range Status   07/15/2018 11:00  (H) 65 - 99 mg/dL Final   07/15/2018 05:07  (H) 65 - 99 mg/dL Final   07/14/2018 08:48  (H) 65 - 99 mg/dL Final   07/14/2018 05:27  (H) 65 - 99 mg/dL Final     Lab Results   Component Value Date    HBA1C 6.8 (H) 07/14/2018

## 2018-07-14 NOTE — CARE PLAN
Problem: Knowledge Deficit  Goal: Patient/Significant other demonstrates understanding of disease process, treatment plan, medications and discharge instructions  CT Myelogram, LP, PT/OT    Problem: Elimination  Goal: Regular urinary elimination    Intervention: Monitor ability to void  Voided via bed pan, will monitor for urinary incontinence

## 2018-07-14 NOTE — ASSESSMENT & PLAN NOTE
Hx of mitochondrial disease vs haschimotos disease?   Recheck tsh   Has improved in the past with steroids will trial on solumedrol dose per previous recs   Would consider neuro consult

## 2018-07-15 LAB
ALBUMIN SERPL BCP-MCNC: 5 G/DL (ref 3.2–4.9)
ALBUMIN/GLOB SERPL: 2.2 G/DL
ALP SERPL-CCNC: 79 U/L (ref 30–99)
ALT SERPL-CCNC: 88 U/L (ref 2–50)
ANION GAP SERPL CALC-SCNC: 13 MMOL/L (ref 0–11.9)
AST SERPL-CCNC: 45 U/L (ref 12–45)
BASOPHILS # BLD AUTO: 0.4 % (ref 0–1.8)
BASOPHILS # BLD: 0.03 K/UL (ref 0–0.12)
BILIRUB SERPL-MCNC: 0.8 MG/DL (ref 0.1–1.5)
BUN SERPL-MCNC: 12 MG/DL (ref 8–22)
CALCIUM SERPL-MCNC: 9.8 MG/DL (ref 8.5–10.5)
CHLORIDE SERPL-SCNC: 101 MMOL/L (ref 96–112)
CHOLEST SERPL-MCNC: 236 MG/DL (ref 100–199)
CO2 SERPL-SCNC: 22 MMOL/L (ref 20–33)
CREAT SERPL-MCNC: 0.78 MG/DL (ref 0.5–1.4)
EOSINOPHIL # BLD AUTO: 0.01 K/UL (ref 0–0.51)
EOSINOPHIL NFR BLD: 0.1 % (ref 0–6.9)
ERYTHROCYTE [DISTWIDTH] IN BLOOD BY AUTOMATED COUNT: 40 FL (ref 35.9–50)
GLOBULIN SER CALC-MCNC: 2.3 G/DL (ref 1.9–3.5)
GLUCOSE BLD-MCNC: 124 MG/DL (ref 65–99)
GLUCOSE BLD-MCNC: 145 MG/DL (ref 65–99)
GLUCOSE BLD-MCNC: 152 MG/DL (ref 65–99)
GLUCOSE BLD-MCNC: 240 MG/DL (ref 65–99)
GLUCOSE SERPL-MCNC: 152 MG/DL (ref 65–99)
HCT VFR BLD AUTO: 45.9 % (ref 37–47)
HDLC SERPL-MCNC: 75 MG/DL
HGB BLD-MCNC: 15.8 G/DL (ref 12–16)
IMM GRANULOCYTES # BLD AUTO: 0.02 K/UL (ref 0–0.11)
IMM GRANULOCYTES NFR BLD AUTO: 0.3 % (ref 0–0.9)
LDLC SERPL CALC-MCNC: 137 MG/DL
LYMPHOCYTES # BLD AUTO: 1.17 K/UL (ref 1–4.8)
LYMPHOCYTES NFR BLD: 16 % (ref 22–41)
MCH RBC QN AUTO: 30.9 PG (ref 27–33)
MCHC RBC AUTO-ENTMCNC: 34.4 G/DL (ref 33.6–35)
MCV RBC AUTO: 89.8 FL (ref 81.4–97.8)
MONOCYTES # BLD AUTO: 0.31 K/UL (ref 0–0.85)
MONOCYTES NFR BLD AUTO: 4.2 % (ref 0–13.4)
NEUTROPHILS # BLD AUTO: 5.78 K/UL (ref 2–7.15)
NEUTROPHILS NFR BLD: 79 % (ref 44–72)
NRBC # BLD AUTO: 0 K/UL
NRBC BLD-RTO: 0 /100 WBC
PLATELET # BLD AUTO: 230 K/UL (ref 164–446)
PMV BLD AUTO: 11.3 FL (ref 9–12.9)
POTASSIUM SERPL-SCNC: 4.4 MMOL/L (ref 3.6–5.5)
PROT SERPL-MCNC: 7.3 G/DL (ref 6–8.2)
RBC # BLD AUTO: 5.11 M/UL (ref 4.2–5.4)
SODIUM SERPL-SCNC: 136 MMOL/L (ref 135–145)
TRIGL SERPL-MCNC: 121 MG/DL (ref 0–149)
WBC # BLD AUTO: 7.3 K/UL (ref 4.8–10.8)

## 2018-07-15 PROCEDURE — G0378 HOSPITAL OBSERVATION PER HR: HCPCS

## 2018-07-15 PROCEDURE — G8988 SELF CARE GOAL STATUS: HCPCS | Mod: CJ

## 2018-07-15 PROCEDURE — 700111 HCHG RX REV CODE 636 W/ 250 OVERRIDE (IP): Performed by: HOSPITALIST

## 2018-07-15 PROCEDURE — 85025 COMPLETE CBC W/AUTO DIFF WBC: CPT

## 2018-07-15 PROCEDURE — 36415 COLL VENOUS BLD VENIPUNCTURE: CPT

## 2018-07-15 PROCEDURE — 700111 HCHG RX REV CODE 636 W/ 250 OVERRIDE (IP): Performed by: NURSE PRACTITIONER

## 2018-07-15 PROCEDURE — 99225 PR SUBSEQUENT OBSERVATION CARE,LEVEL II: CPT | Performed by: HOSPITALIST

## 2018-07-15 PROCEDURE — 97166 OT EVAL MOD COMPLEX 45 MIN: CPT

## 2018-07-15 PROCEDURE — G8987 SELF CARE CURRENT STATUS: HCPCS | Mod: CK

## 2018-07-15 PROCEDURE — A9270 NON-COVERED ITEM OR SERVICE: HCPCS | Performed by: HOSPITALIST

## 2018-07-15 PROCEDURE — 80053 COMPREHEN METABOLIC PANEL: CPT

## 2018-07-15 PROCEDURE — 96375 TX/PRO/DX INJ NEW DRUG ADDON: CPT

## 2018-07-15 PROCEDURE — A9270 NON-COVERED ITEM OR SERVICE: HCPCS | Performed by: NURSE PRACTITIONER

## 2018-07-15 PROCEDURE — 96372 THER/PROPH/DIAG INJ SC/IM: CPT | Mod: XU

## 2018-07-15 PROCEDURE — 700102 HCHG RX REV CODE 250 W/ 637 OVERRIDE(OP): Performed by: HOSPITALIST

## 2018-07-15 PROCEDURE — 82962 GLUCOSE BLOOD TEST: CPT

## 2018-07-15 PROCEDURE — 700102 HCHG RX REV CODE 250 W/ 637 OVERRIDE(OP): Performed by: NURSE PRACTITIONER

## 2018-07-15 PROCEDURE — 96376 TX/PRO/DX INJ SAME DRUG ADON: CPT

## 2018-07-15 PROCEDURE — 94660 CPAP INITIATION&MGMT: CPT

## 2018-07-15 PROCEDURE — 80061 LIPID PANEL: CPT

## 2018-07-15 PROCEDURE — 96374 THER/PROPH/DIAG INJ IV PUSH: CPT

## 2018-07-15 RX ADMIN — GABAPENTIN 600 MG: 300 CAPSULE ORAL at 04:53

## 2018-07-15 RX ADMIN — HEPARIN SODIUM 5000 UNITS: 5000 INJECTION, SOLUTION INTRAVENOUS; SUBCUTANEOUS at 14:20

## 2018-07-15 RX ADMIN — HYDROCODONE BITARTRATE AND ACETAMINOPHEN 1 TABLET: 10; 325 TABLET ORAL at 21:45

## 2018-07-15 RX ADMIN — PROCHLORPERAZINE EDISYLATE 10 MG: 5 INJECTION INTRAMUSCULAR; INTRAVENOUS at 07:45

## 2018-07-15 RX ADMIN — TRAZODONE HYDROCHLORIDE 100 MG: 100 TABLET ORAL at 20:32

## 2018-07-15 RX ADMIN — GABAPENTIN 600 MG: 300 CAPSULE ORAL at 11:39

## 2018-07-15 RX ADMIN — BUSPIRONE HYDROCHLORIDE 5 MG: 10 TABLET ORAL at 04:54

## 2018-07-15 RX ADMIN — PREDNISOLONE ACETATE 1 DROP: 10 SUSPENSION/ DROPS OPHTHALMIC at 20:33

## 2018-07-15 RX ADMIN — PREDNISONE 20 MG: 20 TABLET ORAL at 09:01

## 2018-07-15 RX ADMIN — ZOLPIDEM TARTRATE 5 MG: 5 TABLET ORAL at 01:22

## 2018-07-15 RX ADMIN — HEPARIN SODIUM 5000 UNITS: 5000 INJECTION, SOLUTION INTRAVENOUS; SUBCUTANEOUS at 04:54

## 2018-07-15 RX ADMIN — PREDNISOLONE ACETATE 1 DROP: 10 SUSPENSION/ DROPS OPHTHALMIC at 08:00

## 2018-07-15 RX ADMIN — PREDNISOLONE ACETATE 1 DROP: 10 SUSPENSION/ DROPS OPHTHALMIC at 10:00

## 2018-07-15 RX ADMIN — PROCHLORPERAZINE EDISYLATE 10 MG: 5 INJECTION INTRAMUSCULAR; INTRAVENOUS at 21:45

## 2018-07-15 RX ADMIN — HEPARIN SODIUM 5000 UNITS: 5000 INJECTION, SOLUTION INTRAVENOUS; SUBCUTANEOUS at 21:45

## 2018-07-15 RX ADMIN — PREDNISOLONE ACETATE 1 DROP: 10 SUSPENSION/ DROPS OPHTHALMIC at 21:45

## 2018-07-15 RX ADMIN — STANDARDIZED SENNA CONCENTRATE AND DOCUSATE SODIUM 2 TABLET: 8.6; 5 TABLET, FILM COATED ORAL at 04:53

## 2018-07-15 RX ADMIN — ONDANSETRON 4 MG: 4 TABLET, ORALLY DISINTEGRATING ORAL at 17:24

## 2018-07-15 RX ADMIN — PREDNISOLONE ACETATE 1 DROP: 10 SUSPENSION/ DROPS OPHTHALMIC at 18:00

## 2018-07-15 RX ADMIN — HYDROCODONE BITARTRATE AND ACETAMINOPHEN 1 TABLET: 10; 325 TABLET ORAL at 04:53

## 2018-07-15 RX ADMIN — PREDNISOLONE ACETATE 1 DROP: 10 SUSPENSION/ DROPS OPHTHALMIC at 04:54

## 2018-07-15 RX ADMIN — INSULIN HUMAN 1 UNITS: 100 INJECTION, SOLUTION PARENTERAL at 20:36

## 2018-07-15 RX ADMIN — ZIPRASIDONE HYDROCHLORIDE 80 MG: 80 CAPSULE ORAL at 16:50

## 2018-07-15 RX ADMIN — PREDNISOLONE ACETATE 1 DROP: 10 SUSPENSION/ DROPS OPHTHALMIC at 02:37

## 2018-07-15 RX ADMIN — BUSPIRONE HYDROCHLORIDE 5 MG: 10 TABLET ORAL at 16:31

## 2018-07-15 RX ADMIN — HYDROCODONE BITARTRATE AND ACETAMINOPHEN 1 TABLET: 10; 325 TABLET ORAL at 17:23

## 2018-07-15 RX ADMIN — PREDNISOLONE ACETATE 1 DROP: 10 SUSPENSION/ DROPS OPHTHALMIC at 00:50

## 2018-07-15 RX ADMIN — HYDROCODONE BITARTRATE AND ACETAMINOPHEN 1 TABLET: 10; 325 TABLET ORAL at 00:49

## 2018-07-15 RX ADMIN — STANDARDIZED SENNA CONCENTRATE AND DOCUSATE SODIUM 2 TABLET: 8.6; 5 TABLET, FILM COATED ORAL at 16:31

## 2018-07-15 RX ADMIN — PREDNISOLONE ACETATE 1 DROP: 10 SUSPENSION/ DROPS OPHTHALMIC at 16:00

## 2018-07-15 RX ADMIN — SITAGLIPTIN 100 MG: 100 TABLET, FILM COATED ORAL at 04:53

## 2018-07-15 RX ADMIN — PREDNISONE 20 MG: 20 TABLET ORAL at 16:32

## 2018-07-15 RX ADMIN — PREDNISOLONE ACETATE 1 DROP: 10 SUSPENSION/ DROPS OPHTHALMIC at 14:00

## 2018-07-15 RX ADMIN — ZIPRASIDONE HYDROCHLORIDE 80 MG: 80 CAPSULE ORAL at 12:05

## 2018-07-15 RX ADMIN — INSULIN HUMAN 2 UNITS: 100 INJECTION, SOLUTION PARENTERAL at 17:49

## 2018-07-15 RX ADMIN — HYDROCODONE BITARTRATE AND ACETAMINOPHEN 1 TABLET: 10; 325 TABLET ORAL at 11:43

## 2018-07-15 RX ADMIN — PREDNISOLONE ACETATE 1 DROP: 10 SUSPENSION/ DROPS OPHTHALMIC at 04:18

## 2018-07-15 RX ADMIN — GABAPENTIN 600 MG: 300 CAPSULE ORAL at 16:32

## 2018-07-15 RX ADMIN — FLUOXETINE HYDROCHLORIDE 20 MG: 20 CAPSULE ORAL at 06:00

## 2018-07-15 RX ADMIN — PREDNISOLONE ACETATE 1 DROP: 10 SUSPENSION/ DROPS OPHTHALMIC at 12:00

## 2018-07-15 RX ADMIN — ASPIRIN 81 MG: 81 TABLET, COATED ORAL at 04:53

## 2018-07-15 RX ADMIN — PROCHLORPERAZINE EDISYLATE 10 MG: 5 INJECTION INTRAMUSCULAR; INTRAVENOUS at 14:27

## 2018-07-15 ASSESSMENT — COGNITIVE AND FUNCTIONAL STATUS - GENERAL
STANDING UP FROM CHAIR USING ARMS: A LOT
SUGGESTED CMS G CODE MODIFIER DAILY ACTIVITY: CK
DRESSING REGULAR UPPER BODY CLOTHING: A LITTLE
DRESSING REGULAR LOWER BODY CLOTHING: A LOT
SUGGESTED CMS G CODE MODIFIER DAILY ACTIVITY: CK
DRESSING REGULAR UPPER BODY CLOTHING: A LITTLE
TURNING FROM BACK TO SIDE WHILE IN FLAT BAD: A LOT
DAILY ACTIVITIY SCORE: 17
EATING MEALS: A LITTLE
CLIMB 3 TO 5 STEPS WITH RAILING: A LOT
HELP NEEDED FOR BATHING: A LOT
WALKING IN HOSPITAL ROOM: A LOT
SUGGESTED CMS G CODE MODIFIER MOBILITY: CL
MOVING TO AND FROM BED TO CHAIR: A LITTLE
DRESSING REGULAR LOWER BODY CLOTHING: A LITTLE
EATING MEALS: A LITTLE
PERSONAL GROOMING: A LITTLE
HELP NEEDED FOR BATHING: A LITTLE
MOBILITY SCORE: 13
MOVING FROM LYING ON BACK TO SITTING ON SIDE OF FLAT BED: A LOT
PERSONAL GROOMING: A LITTLE
DAILY ACTIVITIY SCORE: 15
TOILETING: A LOT
TOILETING: A LOT

## 2018-07-15 ASSESSMENT — ENCOUNTER SYMPTOMS
SHORTNESS OF BREATH: 0
FEVER: 0
HEADACHES: 0
DIZZINESS: 0
CONSTIPATION: 0
PALPITATIONS: 0
WEAKNESS: 1
DIARRHEA: 0
NAUSEA: 0
COUGH: 0
CHILLS: 0
VOMITING: 0
FOCAL WEAKNESS: 1
MYALGIAS: 0
ABDOMINAL PAIN: 0

## 2018-07-15 ASSESSMENT — PAIN SCALES - GENERAL
PAINLEVEL_OUTOF10: 6
PAINLEVEL_OUTOF10: 9
PAINLEVEL_OUTOF10: 8

## 2018-07-15 ASSESSMENT — ACTIVITIES OF DAILY LIVING (ADL): TOILETING: REQUIRES ASSIST

## 2018-07-15 NOTE — PROGRESS NOTES
Hospital Medicine Daily Progress Note    Date of Service  7/15/2018    Chief Complaint  27 y.o. female admitted 7/14/2018 with LLE weakness    Hospital Course    This is a 26 yo female with prior back surgery with ongoing lower extremity weakness, bladder, and bowel incontinence. She comes in with worsening weakness and is observed with CT myelogram. This was negative and she is evaluated by PT/OT.      Interval Problem Update  7/15 - discussed with patient and she is feeling stronger after I mention that her CT myelogram is negative. She states she is anxious to get better and she asks me when she gets to go home. She seems encouraged that it could be soon as long as she works well with PT/OT.     Consultants/Specialty  None    Disposition  Clinical    Review of Systems  Review of Systems   Constitutional: Negative for chills and fever.   Respiratory: Negative for cough and shortness of breath.    Cardiovascular: Negative for chest pain and palpitations.   Gastrointestinal: Negative for abdominal pain, constipation, diarrhea, nausea and vomiting.   Genitourinary: Negative for dysuria.   Musculoskeletal: Negative for myalgias.   Skin: Negative for itching.   Neurological: Positive for focal weakness (LLE weaker than others) and weakness. Negative for dizziness and headaches.   All other systems reviewed and are negative.       Physical Exam  Blood Pressure: 127/71   Temperature: 36.5 °C (97.7 °F)   Pulse: 74   Respiration: 16   Pulse Oximetry: 92 %     Physical Exam   Constitutional: She appears well-developed.   Markedly overnourished   HENT:   Head: Normocephalic and atraumatic.   Mouth/Throat: Oropharynx is clear and moist.   Eyes: Conjunctivae are normal. Pupils are equal, round, and reactive to light. No scleral icterus.   Cardiovascular: Normal rate, regular rhythm, normal heart sounds and intact distal pulses.    No murmur heard.  Pulmonary/Chest: Effort normal and breath sounds normal. No respiratory  distress. She has no wheezes.   Abdominal: Soft. Bowel sounds are normal. She exhibits no distension. There is no tenderness.   Musculoskeletal: Normal range of motion. She exhibits no edema or tenderness.   3/5 strength BLE   Neurological: She is alert.   Vitals reviewed.      Fluids    Intake/Output Summary (Last 24 hours) at 07/15/18 1321  Last data filed at 07/15/18 0900   Gross per 24 hour   Intake              100 ml   Output                0 ml   Net              100 ml       Laboratory  Recent Labs      07/14/18   0556  07/15/18   0245   WBC  8.2  7.3   RBC  5.06  5.11   HEMOGLOBIN  15.9  15.8   HEMATOCRIT  45.7  45.9   MCV  90.3  89.8   MCH  31.4  30.9   MCHC  34.8  34.4   RDW  40.6  40.0   PLATELETCT  239  230   MPV  11.3  11.3     Recent Labs      07/14/18   0556  07/15/18   0245   SODIUM  135  136   POTASSIUM  4.1  4.4   CHLORIDE  100  101   CO2  23  22   GLUCOSE  121*  152*   BUN  8  12   CREATININE  0.69  0.78   CALCIUM  9.9  9.8     Recent Labs      07/14/18   1154   APTT  27.2   INR  1.02         Recent Labs      07/15/18   0245   TRIGLYCERIDE  121   HDL  75   LDL  137*       Imaging  DX-LUMBAR PUNCTURE FOR CT ONLY   Final Result         Successful fluoroscopic-guided lumbar puncture with intrathecal contrast administration for subsequent CT myelogram.      CT-LSPINE WITH PLUS RECONS   Final Result      1.  Good alignment of the lumbar spine with anterior spinal fusion hardware and disc graft material in place at L4-5.      2.  Minimal diffuse annular bulging of the L3-4 disc without effacement of thecal sac.      3.  Remainder of the lumbar disc spaces appear normal. No bony foraminal stenosis.      4.  Bilateral facet arthropathy L5-S1.      CT-LSPINE W/O PLUS RECONS   Final Result         1.  No acute traumatic bony injury of the lumbar spine.   2.  Mild bilateral neural foraminal narrowing at L5/S1 due to facet arthrosis.   3.  Masslike structure at the tip of the appendix, appearance concerning  for appendiceal mucocele. Recommend surgical referral for further workup and management.      These findings were discussed with the patient's clinician, LUANA NEGRON, on 7/14/2018 5:17 AM.      DX-KNEE 3 VIEWS LEFT   Final Result         1.  No acute traumatic bony injury.           Assessment/Plan  * Left leg weakness- (present on admission)   Assessment & Plan    Myelogram negative.   Will have work with PT/OT  No acute NSG intervention necessary        TONYA (obstructive sleep apnea)- (present on admission)   Assessment & Plan    CPAP        Morbid obesity with BMI of 45.0-49.9, adult (HCC)- (present on admission)   Assessment & Plan    Outpatient weight loss.        Type 2 diabetes mellitus without complication, without long-term current use of insulin ([facility])- (present on admission)   Assessment & Plan    In-hospital FSBG goal less than 180 mg/dL  SSI qAC & qHS when eating, q6 when NPO  Glucose - Accu-Ck   Date/Time Value Ref Range Status   07/15/2018 11:00  (H) 65 - 99 mg/dL Final   07/15/2018 05:07  (H) 65 - 99 mg/dL Final   07/14/2018 08:48  (H) 65 - 99 mg/dL Final   07/14/2018 05:27  (H) 65 - 99 mg/dL Final     Lab Results   Component Value Date    HBA1C 6.8 (H) 07/14/2018             Bowel and bladder incontinence- (present on admission)   Assessment & Plan    Chronic; is there a psychiatric component?        GERD (gastroesophageal reflux disease)- (present on admission)   Assessment & Plan    Stable; monitor         Acquired hypothyroidism- (present on admission)   Assessment & Plan    Recheck tsh, not on levothyroxine         Progressive focal motor weakness- (present on admission)   Assessment & Plan    Hx of mitochondrial disease vs haschimotos disease?   Recheck tsh   Has improved in the past with steroids will trial on solumedrol dose per previous recs   Would consider neuro consult         Anxiety- (present on admission)   Assessment & Plan    Resume home buspar/prozac         Borderline personality disorder in adult- (present on admission)   Assessment & Plan    Hx of

## 2018-07-15 NOTE — PROGRESS NOTES
Pt. A/ox4. States pain is 7/10. 1L nasal cannula. IV intact. Poc discussed, bed alarm on, call light within reach.

## 2018-07-15 NOTE — DIETARY
NUTRITION SERVICES: Pt with BMI >40 (44.93). Weight loss counseling not appropriate in acute care setting. Recommend referral to outpatient nutrition services for weight management after D/C, if pt desires.

## 2018-07-15 NOTE — CARE PLAN
"Problem: Communication  Goal: The ability to communicate needs accurately and effectively will improve  Outcome: PROGRESSING AS EXPECTED  Pt. Having anxiety, calling  stating she \"needs help immediately\" repeatedly    Problem: Urinary Elimination:  Goal: Ability to reestablish a normal urinary elimination pattern will improve  Outcome: PROGRESSING SLOWER THAN EXPECTED  Pt. Unable to use anything other than bedpan r/t mobility, pt. Having urgency.      "

## 2018-07-15 NOTE — PROCEDURES
EXAMINATION:  7/14/2018 3:39 PM    HISTORY/REASON FOR EXAM:  Atraumatic Pain.  Severe back pain with numbness/paralysis to the lower extremities    TECHNIQUE/EXAM DESCRIPTION AND NUMBER OF VIEWS:  Fluoroscopic-guided lumbar puncture.    2 fluoroscopic images obtained.    Fluoroscopy time: 42 seconds    PROCEDURE:       Informed consent was obtained. A timeout was taken. With the patient in prone position, utilizing fluoroscopic guidance, satisfactory site for access was identified at the L3-4 level. This region was prepared and draped in a sterile manner. Local anesthesia with 1% lidocaine was administered. A 22  gauge spinal needle was advanced into the subarachnoid space. After return of clear CSF, 12 cc of Omnipaque 180 (for intrathecal administration) was injected with fluoroscopic guidance. The patient tolerated the procedure well with no evidence of complication.    IMPRESSION:    Successful fluoroscopic-guided lumbar puncture with intrathecal contrast administration for subsequent CT myelogram.

## 2018-07-15 NOTE — THERAPY
"Occupational Therapy Evaluation completed.   Functional Status:  Pt is a 28 y/o female who was admitted for LLE weakness. Pt reports she bent forward over the weekend, heard a pop and had L sided weakness following. Pt has had ongoing LE weakness that seems to be progressing. 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 Pt had a prior L4-5 fusion d/t insufficiency from childhood scoliosis. 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 Pt was able to sit EOB with Mod A via log roll technique, stand with Mod A. When pain occurs, pt legs collapse despite only L leg weakness/pain. Pt reaction to pain is not safe, often collapses d/t sharp shooting pain. Pt educated on use of arms and R leg support on FWW. Pt is not very active, has a hospital bed and bedside commode at home which makes up most of her daily routine. Pt lives with grandma in a [facility], reporting grandma helps her with most ADLs. Case management will need to f/u to ensure pt family can continue to care for her, otherwise patient will need post acute rehab d/t increase needs with ADL care.   Plan of Care: Will benefit from Occupational Therapy 2 times per week  Discharge Recommendations:  Equipment: Will Continue to Assess for Equipment Needs. Post-acute therapy Discharge to post acute rehab facility or home with home health for additional skilled therapy services.    See \"Rehab Therapy-Acute\" Patient Summary Report for complete documentation.    "

## 2018-07-16 ENCOUNTER — HOME HEALTH ADMISSION (OUTPATIENT)
Dept: HOME HEALTH SERVICES | Facility: HOME HEALTHCARE | Age: 28
End: 2018-07-16
Payer: MEDICARE

## 2018-07-16 VITALS
OXYGEN SATURATION: 92 % | HEIGHT: 65 IN | WEIGHT: 270 LBS | HEART RATE: 70 BPM | DIASTOLIC BLOOD PRESSURE: 63 MMHG | SYSTOLIC BLOOD PRESSURE: 120 MMHG | TEMPERATURE: 97 F | BODY MASS INDEX: 44.98 KG/M2 | RESPIRATION RATE: 18 BRPM

## 2018-07-16 LAB — GLUCOSE BLD-MCNC: 90 MG/DL (ref 65–99)

## 2018-07-16 PROCEDURE — A9270 NON-COVERED ITEM OR SERVICE: HCPCS | Performed by: HOSPITALIST

## 2018-07-16 PROCEDURE — G0378 HOSPITAL OBSERVATION PER HR: HCPCS

## 2018-07-16 PROCEDURE — 82962 GLUCOSE BLOOD TEST: CPT

## 2018-07-16 PROCEDURE — 99217 PR OBSERVATION CARE DISCHARGE: CPT | Performed by: HOSPITALIST

## 2018-07-16 PROCEDURE — 96372 THER/PROPH/DIAG INJ SC/IM: CPT

## 2018-07-16 PROCEDURE — 700111 HCHG RX REV CODE 636 W/ 250 OVERRIDE (IP): Performed by: NURSE PRACTITIONER

## 2018-07-16 PROCEDURE — 700102 HCHG RX REV CODE 250 W/ 637 OVERRIDE(OP): Performed by: NURSE PRACTITIONER

## 2018-07-16 PROCEDURE — 700102 HCHG RX REV CODE 250 W/ 637 OVERRIDE(OP): Performed by: HOSPITALIST

## 2018-07-16 PROCEDURE — 96376 TX/PRO/DX INJ SAME DRUG ADON: CPT

## 2018-07-16 PROCEDURE — A9270 NON-COVERED ITEM OR SERVICE: HCPCS | Performed by: NURSE PRACTITIONER

## 2018-07-16 PROCEDURE — 700111 HCHG RX REV CODE 636 W/ 250 OVERRIDE (IP): Performed by: HOSPITALIST

## 2018-07-16 RX ADMIN — ASPIRIN 81 MG: 81 TABLET, COATED ORAL at 06:25

## 2018-07-16 RX ADMIN — PREDNISOLONE ACETATE 1 DROP: 10 SUSPENSION/ DROPS OPHTHALMIC at 04:25

## 2018-07-16 RX ADMIN — STANDARDIZED SENNA CONCENTRATE AND DOCUSATE SODIUM 2 TABLET: 8.6; 5 TABLET, FILM COATED ORAL at 06:24

## 2018-07-16 RX ADMIN — ONDANSETRON 4 MG: 4 TABLET, ORALLY DISINTEGRATING ORAL at 08:51

## 2018-07-16 RX ADMIN — GABAPENTIN 600 MG: 300 CAPSULE ORAL at 06:24

## 2018-07-16 RX ADMIN — PREDNISOLONE ACETATE 1 DROP: 10 SUSPENSION/ DROPS OPHTHALMIC at 08:00

## 2018-07-16 RX ADMIN — PREDNISOLONE ACETATE 1 DROP: 10 SUSPENSION/ DROPS OPHTHALMIC at 00:12

## 2018-07-16 RX ADMIN — PREDNISONE 20 MG: 20 TABLET ORAL at 08:53

## 2018-07-16 RX ADMIN — HYDROCODONE BITARTRATE AND ACETAMINOPHEN 1 TABLET: 10; 325 TABLET ORAL at 06:49

## 2018-07-16 RX ADMIN — HYDROCODONE BITARTRATE AND ACETAMINOPHEN 1 TABLET: 10; 325 TABLET ORAL at 02:44

## 2018-07-16 RX ADMIN — PROCHLORPERAZINE EDISYLATE 10 MG: 5 INJECTION INTRAMUSCULAR; INTRAVENOUS at 06:48

## 2018-07-16 RX ADMIN — PREDNISOLONE ACETATE 1 DROP: 10 SUSPENSION/ DROPS OPHTHALMIC at 02:42

## 2018-07-16 RX ADMIN — PREDNISOLONE ACETATE 1 DROP: 10 SUSPENSION/ DROPS OPHTHALMIC at 06:24

## 2018-07-16 RX ADMIN — FLUOXETINE HYDROCHLORIDE 20 MG: 20 CAPSULE ORAL at 06:25

## 2018-07-16 RX ADMIN — SITAGLIPTIN 100 MG: 100 TABLET, FILM COATED ORAL at 06:24

## 2018-07-16 RX ADMIN — HEPARIN SODIUM 5000 UNITS: 5000 INJECTION, SOLUTION INTRAVENOUS; SUBCUTANEOUS at 06:24

## 2018-07-16 RX ADMIN — PREDNISOLONE ACETATE 1 DROP: 10 SUSPENSION/ DROPS OPHTHALMIC at 10:00

## 2018-07-16 RX ADMIN — BUSPIRONE HYDROCHLORIDE 5 MG: 10 TABLET ORAL at 06:25

## 2018-07-16 RX ADMIN — ZIPRASIDONE HYDROCHLORIDE 80 MG: 80 CAPSULE ORAL at 10:46

## 2018-07-16 ASSESSMENT — PAIN SCALES - GENERAL
PAINLEVEL_OUTOF10: 8
PAINLEVEL_OUTOF10: 5
PAINLEVEL_OUTOF10: 8

## 2018-07-16 NOTE — DISCHARGE PLANNING
Received Choice form at 10:07 am  Agency/Facility Name: [facility]  Referral sent per Choice form @ 10:07 am.

## 2018-07-16 NOTE — PROGRESS NOTES
Patient sat up in chair for 20 min and she states that she is dizzy and cant tolerate, very shaky and her blood sugar is 240>patient states this is just how she felt after her last lumbar puncture and she states that she could not sit upright for greater than 1 month.Patient required 2 assist back to bed even though was able to get up with 1 assist to chair. Continue to monitor

## 2018-07-16 NOTE — PROGRESS NOTES
Pt A&Ox4, reporting pain 8/10 but seemed comfortable while talking with RN, will medicate when PRN is available next.   RA during daytime, nocturnal CPAP on pt and can demonstrate how to remove.   BUE strength equal, LLE 2/5 strength, RLE 4/5 strength. No plantar flexion in LLE, minimal dorsiflexion. Numbness and tingling to LLE, no changes.   + bowel sounds, + flatus, LBM recently.   POC discussed, no further needs at this time, call light within reach, bed alarm armed.

## 2018-07-16 NOTE — DISCHARGE INSTRUCTIONS
Discharge Instructions    Discharged to home by car with relative. Discharged via wheelchair, hospital escort: Yes.  Special equipment needed: Walker    Be sure to schedule a follow-up appointment with your primary care doctor or any specialists as instructed.     Discharge Plan:   Diet Plan: Discussed  Activity Level: Discussed  Confirmed Follow up Appointment: Patient to Call and Schedule Appointment (call MD as needed)  Medication Reconciliation Updated: Yes  Influenza Vaccine Indication: Patient Refuses    I understand that a diet low in cholesterol, fat, and sodium is recommended for good health. Unless I have been given specific instructions below for another diet, I accept this instruction as my diet prescription.   Other diet: resume regular diet no concentrated sweets    Special Instructions: None    · Is patient discharged on Warfarin / Coumadin?   No     Depression / Suicide Risk    As you are discharged from this [facility] facility, it is important to learn how to keep safe from harming yourself.    Recognize the warning signs:  · Abrupt changes in personality, positive or negative- including increase in energy   · Giving away possessions  · Change in eating patterns- significant weight changes-  positive or negative  · Change in sleeping patterns- unable to sleep or sleeping all the time   · Unwillingness or inability to communicate  · Depression  · Unusual sadness, discouragement and loneliness  · Talk of wanting to die  · Neglect of personal appearance   · Rebelliousness- reckless behavior  · Withdrawal from people/activities they love  · Confusion- inability to concentrate     If you or a loved one observes any of these behaviors or has concerns about self-harm, here's what you can do:  · Talk about it- your feelings and reasons for harming yourself  · Remove any means that you might use to hurt yourself (examples: pills, rope, extension cords, firearm)  · Get professional help from the community  (Mental Health, Substance Abuse, psychological counseling)  · Do not be alone:Call your Safe Contact- someone whom you trust who will be there for you.  · Call your local CRISIS HOTLINE 471-3192 or 798-788-2660  · Call your local Children's Mobile Crisis Response Team [place] (474) 214-2359 or www.Prylos  · Call the toll free National Suicide Prevention Hotlines   · National Suicide Prevention Lifeline 664-839-EHLG (9583)  · National Hope Line Network 800-SUICIDE (811-9517)

## 2018-07-16 NOTE — DISCHARGE PLANNING
ATTN: Case Management  RE: Referral for Home Health                We would like to take this opportunity to thank you for submitting a referral for your patient to continue care with [facility]. Our skilled team is dedicated to helping all patients recover and gain independence in the home setting.            As of 7/16/2018, we have accepted the above patient into our service. A [facility] clinician will be out to see the patient within 48 hours to conduct our initial visit. If you have any questions or concerns regarding the patient’s transition to Home Health, please do not hesitate to contact us. We are open for referrals 7 days a week from 8AM to 5PM at [phone].      We look forward to collaborating with you,  [facility] Team

## 2018-07-16 NOTE — DISCHARGE PLANNING
Anticipated Discharge Disposition:   Home with [facility] (accepted)    Action:   Notified CN that pt has been accepted by [facility]    Barriers to Discharge:   none    Plan:   Dc home today.

## 2018-07-16 NOTE — DISCHARGE SUMMARY
"Discharge Summary    CHIEF COMPLAINT ON ADMISSION  Chief Complaint   Patient presents with   • Back Pain     sudden onset of low back pain w/ shooting pain, caused Pt to fall   • T-5000 GLF     -LOC, did not strike head, fell to knees, no knee trauma noted        Reason for Admission  Back pain      Admission Date  7/14/2018    CODE STATUS  Full Code    HPI & HOSPITAL COURSE    This is a 28 yo female with prior back surgery with ongoing lower extremity weakness, bladder, and bowel incontinence. She comes in with worsening weakness and is observed with CT myelogram. This was negative and she is evaluated by PT/OT.  She was felt to be a good candidate for home health and this was arranged. She was ordered a walker as well.     Therefore, she is discharged in good and stable condition to home with organized home healthcare and close outpatient follow-up.    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      \"per patients grandmother\"    Anxiety POA: Yes    Progressive focal motor weakness POA: Yes    Acquired hypothyroidism POA: Yes    GERD (gastroesophageal reflux disease) (Chronic) POA: Yes    Bowel and bladder incontinence POA: Yes    Type 2 diabetes mellitus without complication, without long-term current use of insulin (HCC) POA: Yes    Morbid obesity with BMI of 45.0-49.9, adult (HCC) POA: Yes    TONYA (obstructive sleep apnea) POA: Yes  Resolved Problems:    * No resolved hospital problems. *      FOLLOW UP  Future Appointments  Date Time Provider Department Center   7/24/2018 7:35 PM SLEEP TECH [facility] None   10/11/2018 9:00 AM VANIA Asif [facility] None     No follow-up provider specified.    MEDICATIONS ON DISCHARGE     Medication List      CONTINUE taking these medications      " Instructions   albuterol 108 (90 Base) MCG/ACT Aers inhalation aerosol   Inhale 2 Puffs by mouth every 6 hours as needed for Shortness of Breath.  Dose:  2 Puff     aspirin EC 81 MG Tbec  Commonly known as:  ECOTRIN   Take 1 Tab by mouth every day.  Dose:  81 mg     baclofen 10 MG Tabs  Commonly known as:  LIORESAL   Take 10 mg by mouth 2 times a day.  Dose:  10 mg     busPIRone 5 MG Tabs  Commonly known as:  BUSPAR   Take 1 Tab by mouth 2 times a day.  Dose:  5 mg     cholestyramine 4 g packet  Commonly known as:  QUESTRAN   Take 1 Packet by mouth every day.  Dose:  1 Packet     FLUoxetine 10 MG Caps  Commonly known as:  PROZAC   TAKE ONE CAPSULE BY MOUTH ONCE A DAY     gabapentin 600 MG tablet  Commonly known as:  NEURONTIN   Take 600-1,200 mg by mouth 2 times a day. 600 MG IN MORNING 1200 MG AT NIGHT  Dose:  600-1200 mg     HYDROcodone/acetaminophen  MG Tabs  Commonly known as:  NORCO   Take 1 Tab by mouth every four hours as needed for Severe Pain for up to 7 days.  Dose:  1 Tab     ivabradine 5 MG Tabs tablet  Commonly known as:  CORLANOR   Doctor's comments:  rx called  Take 1 Tab by mouth 2 times a day, with meals.  Dose:  5 mg     Melatonin 5 MG Tabs   Doctor's comments:  Takes two tabs at Bedtime (10 mg.)  Take 10 mg by mouth every bedtime.  Dose:  10 mg     mupirocin 2 % Oint  Commonly known as:  BACTROBAN   Apply BID to infected sores x 1week     prednisoLONE acetate 1 % Susp  Commonly known as:  PRED FORTE   Place 1 Drop in right eye every 2 hours as needed.  Dose:  1 Drop     predniSONE 20 MG Tabs  Commonly known as:  DELTASONE   Take 1 Tab by mouth 2 times a day, with meals for 5 days.  Dose:  20 mg     promethazine 25 MG Supp  Commonly known as:  PHENERGAN   Insert 1 Suppository in rectum every 6 hours as needed for Nausea/Vomiting.  Dose:  25 mg     SITagliptin 100 MG Tabs  Commonly known as:  JANUVIA   Take 1 Tab by mouth every day.  Dose:  100 mg     sodium bicarbonate 650 MG Tabs  Commonly  "known as:  SODIUM BICARBONATE   Take 650 mg by mouth 2 times a day.  Dose:  650 mg     traZODone 100 MG Tabs  Commonly known as:  DESYREL   Take 1 Tab by mouth every bedtime.  Dose:  100 mg     VICTOZA 18 MG/3ML Sopn injection  Generic drug:  liraglutide   Inject 1.8 mg as instructed every day.  Dose:  1.8 mg     Vitamin D3 61201 units Caps   Take 1 Each by mouth every 7 days.  Dose:  1 Each     ziprasidone 80 MG Caps  Commonly known as:  GEODON   Take 80 mg by mouth 2 Times a Day.  Dose:  80 mg            Allergies  Allergies   Allergen Reactions   • Cefdinir Shortness of Breath and Itching     Tolerated 1/18/17   • Depakote [Divalproex Sodium] Unspecified     Muscle spasms/muscle pain and weakness     • Doxycycline Anaphylaxis and Vomiting     RXN=unknown   • Abilify Unspecified     Headaches/muscle twitching     • Amitriptyline Unspecified     Headaches     • Amoxicillin Rash     Pt states \"I get a rash\".     • Ciprofloxacin Rash     Pt states \"I get a rash\".     • Clindamycin Nausea     Even with food     • Ees [Erythromycin] Vomiting and Nausea   • Flagyl [Metronidazole Hcl] Unspecified     \"eye problems\"     • Flomax [Tamsulosin Hydrochloride] Swelling   • Metformin Unspecified     Increased lactic acid      • Sulfa Drugs Hives and Rash     RXN=since childhood   • Tape Rash     Tears skin off   coban with Tegaderm tape ok  RXN=ongoing   • Vancomycin Itching     Pt becomes flushed in face and chest.   RXN=7/10/16   • Wound Dressing Adhesive Hives     By pt report   • Cephalexin [Keflex] Rash     Pt states she gets a rash with this medication   • Erythromycin Rash     .   • Levofloxacin Unspecified     Leg muscle cramps   • Metronidazole Rash     .   • Valproic Acid Rash     .       DIET  Orders Placed This Encounter   Procedures   • Diet Order Diabetic     Standing Status:   Standing     Number of Occurrences:   1     Order Specific Question:   Diet:     Answer:   Diabetic [3]       ACTIVITY  As " tolerated.  Weight bearing as tolerated    CONSULTATIONS  None    PROCEDURES  None    LABORATORY  Lab Results   Component Value Date    SODIUM 136 07/15/2018    POTASSIUM 4.4 07/15/2018    CHLORIDE 101 07/15/2018    CO2 22 07/15/2018    GLUCOSE 152 (H) 07/15/2018    BUN 12 07/15/2018    CREATININE 0.78 07/15/2018    CREATININE 0.75 (L) 07/20/2010    GLOMRATE >59 07/20/2010        Lab Results   Component Value Date    WBC 7.3 07/15/2018    WBC 6.1 07/20/2010    HEMOGLOBIN 15.8 07/15/2018    HEMATOCRIT 45.9 07/15/2018    PLATELETCT 230 07/15/2018        Total time of the discharge process exceeds 32 minutes.

## 2018-07-16 NOTE — DISCHARGE PLANNING
Agency/Facility Name: [facility]  Spoke To: Received in-basket message  Outcome: HH Referral has been accepted. AUGUST Strong notified via voicemail.

## 2018-07-16 NOTE — CARE PLAN
Problem: Safety  Goal: Will remain free from falls    Intervention: Implement fall precautions  Bed alarm refused, treaded socks on, call light within reach and used appropriately for assistance, will not ambulate without help.       Problem: Venous Thromboembolism (VTW)/Deep Vein Thrombosis (DVT) Prevention:  Goal: Patient will participate in Venous Thrombosis (VTE)/Deep Vein Thrombosis (DVT)Prevention Measures    Intervention: Encourage ambulation/mobilization at level directed by Physical Therapy in collaboration with Interdisciplinary Team  Patient is now ambulating to the bathroom with 2 person assist and FWW, encouraging to keep LLE flat on the ground when ambulating, cannot stand for long periods of time.       Problem: Pain Management  Goal: Pain level will decrease to patient's comfort goal    Intervention: Follow pain managment plan developed in collaboration with patient and Interdisciplinary Team  Administering PRN medications for pain per MAR. Encouraging patient to reposition frequently.

## 2018-07-16 NOTE — FACE TO FACE
Face to Face Supporting Documentation - Home Health    The encounter with this patient was in whole or in part the primary reason for home health admission.    Date of encounter:   Patient:                    MRN:                       YOB: 2019  Kristin Balderrama  [id number]  1990     Home health to see patient for:  Skilled Nursing care for assessment, interventions & education, Medical social work consult, Physical Therapy evaluation and treatment and Occupational therapy evaluation and treatment    Skilled need for:  Exacerbation of Chronic Disease State Lumbar fusion weakness    Skilled nursing interventions to include:  Comment: medication management and disease teaching    Homebound status evidenced by:  Need the aid of supportive devices such as crutches, canes, wheelchairs or walkers or Needs the assistance of another person in order to leave the home. Leaving home requires a considerable and taxing effort. There is a normal inability to leave the home.    Community Physician to provide follow up care: Torres Brody M.D.     Optional Interventions? No      I certify the face to face encounter for this home health care referral meets the CMS requirements and the encounter/clinical assessment with the patient was, in whole, or in part, for the medical condition(s) listed above, which is the primary reason for home health care. Based on my clinical findings: the service(s) are medically necessary, support the need for home health care, and the homebound criteria are met.  I certify that this patient has had a face to face encounter by myself.  Bolivar Hackett M.D. - NPI: [id number]

## 2018-07-16 NOTE — DISCHARGE PLANNING
Anticipated Discharge Disposition:   Home with HH    Action:   Met with pt about HH choice.   She chose [facility] as she was in their services.  Faxed choice to [facility].    Pt lives with grandparents.  She had [facility] previously.   She reports that her grandmother helps her with bathroom hygiene and showering  She owns DMEs, FWW,cane, wheelchair, BSC, tub transfer chair. She has a wheelchair ramp at home.       Barriers to Discharge:   Pending HH acceptance    Plan:   Dc home when HH is accepted.

## 2018-07-16 NOTE — PROGRESS NOTES
Attention of request for walker . Pt's nurse called to confirm that they would be canceling the order at this time in due regards of pt already stating they have a walker.

## 2018-07-17 ENCOUNTER — HOME CARE VISIT (OUTPATIENT)
Dept: HOME HEALTH SERVICES | Facility: HOME HEALTHCARE | Age: 28
End: 2018-07-17
Payer: MEDICARE

## 2018-07-17 ENCOUNTER — TELEPHONE (OUTPATIENT)
Dept: HEALTH INFORMATION MANAGEMENT | Facility: OTHER | Age: 28
End: 2018-07-17

## 2018-07-17 VITALS
DIASTOLIC BLOOD PRESSURE: 56 MMHG | BODY MASS INDEX: 45.25 KG/M2 | HEART RATE: 75 BPM | HEIGHT: 65 IN | TEMPERATURE: 99.1 F | WEIGHT: 271.6 LBS | OXYGEN SATURATION: 97 % | SYSTOLIC BLOOD PRESSURE: 98 MMHG | RESPIRATION RATE: 20 BRPM

## 2018-07-17 PROCEDURE — 665001 SOC-HOME HEALTH

## 2018-07-17 PROCEDURE — 665998 HH PPS REVENUE CREDIT

## 2018-07-17 PROCEDURE — 665999 HH PPS REVENUE DEBIT

## 2018-07-17 PROCEDURE — G0493 RN CARE EA 15 MIN HH/HOSPICE: HCPCS

## 2018-07-17 SDOH — ECONOMIC STABILITY: HOUSING INSECURITY: UNSAFE COOKING RANGE AREA: 0

## 2018-07-17 SDOH — ECONOMIC STABILITY: HOUSING INSECURITY
HOME SAFETY: ER PRESENT IN THE HOME., INSTRUCTED PATIENT/CAREGIVER TO GET ONE AS SOON AS POSSIBLE. SMOKE ALARMS ARE PRESENT AND FUNCTIONAL ON EACH LEVEL OF THE HOME. PATIENT DOES HAVE A FIRE ESCAPE PLAN DEVELOPED. PATIENT DOES NOT HAVE FLAMMABLE MATERIALS PRESENT

## 2018-07-17 SDOH — ECONOMIC STABILITY: HOUSING INSECURITY
HOME SAFETY: PT HAS MULTIPLE RUGS, EXTENDED OXYGEN TUBING, A CAT THAT CREATES A POTENTIAL RISK AS A TRIP/SLIP HAZARD.  OXYGEN SAFETY RISK ASSESSMENT PERFORMED. PATIENT DOES HAVE A NO SMOKING SIGN POSTED IN THE HOME. PATIENT DOES NOT HAVE A WORKING FIRE EXTINGUISH

## 2018-07-17 SDOH — ECONOMIC STABILITY: HOUSING INSECURITY: UNSAFE APPLIANCES: 0

## 2018-07-17 SDOH — ECONOMIC STABILITY: HOUSING INSECURITY: HOME SAFETY: IN THE HOME PRESENTING A FIRE HAZARD. NO EVIDENCE FOUND OF SMOKING MATERIALS PRESENT IN THE HOME.

## 2018-07-17 ASSESSMENT — ENCOUNTER SYMPTOMS
NAUSEA: EVERY DAY
SEVERE DYSPNEA: 1
DEBILITATING PAIN: 1
SHORTNESS OF BREATH: T

## 2018-07-17 ASSESSMENT — ACTIVITIES OF DAILY LIVING (ADL)
HOME_HEALTH_OASIS: 01
OASIS_M1830: 05

## 2018-07-17 ASSESSMENT — PATIENT HEALTH QUESTIONNAIRE - PHQ9
1. LITTLE INTEREST OR PLEASURE IN DOING THINGS: 00
2. FEELING DOWN, DEPRESSED, IRRITABLE, OR HOPELESS: 00

## 2018-07-18 ENCOUNTER — TELEPHONE (OUTPATIENT)
Dept: MEDICAL GROUP | Facility: MEDICAL CENTER | Age: 28
End: 2018-07-18

## 2018-07-18 PROCEDURE — 665999 HH PPS REVENUE DEBIT

## 2018-07-18 PROCEDURE — 665998 HH PPS REVENUE CREDIT

## 2018-07-18 NOTE — TELEPHONE ENCOUNTER
Referral from [facility]. Med review completed. No significant issues noted.

## 2018-07-18 NOTE — TELEPHONE ENCOUNTER
1. Caller Name: Kristin                                         Call Back Number: [phone]      Patient approves a detailed voicemail message: N\A    Patient called and has been back on Victoza and NSS Labsuvia for the last week. Today she stated she has not been feeling well and her blood sugar is 250. Please advise.

## 2018-07-19 ENCOUNTER — HOME CARE VISIT (OUTPATIENT)
Dept: HOME HEALTH SERVICES | Facility: HOME HEALTHCARE | Age: 28
End: 2018-07-19
Payer: MEDICARE

## 2018-07-19 VITALS
TEMPERATURE: 97.6 F | SYSTOLIC BLOOD PRESSURE: 92 MMHG | HEART RATE: 70 BPM | OXYGEN SATURATION: 94 % | RESPIRATION RATE: 16 BRPM | DIASTOLIC BLOOD PRESSURE: 62 MMHG

## 2018-07-19 VITALS
HEART RATE: 70 BPM | DIASTOLIC BLOOD PRESSURE: 62 MMHG | OXYGEN SATURATION: 94 % | TEMPERATURE: 97.6 F | RESPIRATION RATE: 20 BRPM | SYSTOLIC BLOOD PRESSURE: 92 MMHG

## 2018-07-19 PROCEDURE — A6209 FOAM DRSG <=16 SQ IN W/O BDR: HCPCS

## 2018-07-19 PROCEDURE — 665999 HH PPS REVENUE DEBIT

## 2018-07-19 PROCEDURE — G0151 HHCP-SERV OF PT,EA 15 MIN: HCPCS

## 2018-07-19 PROCEDURE — G0299 HHS/HOSPICE OF RN EA 15 MIN: HCPCS

## 2018-07-19 PROCEDURE — 665998 HH PPS REVENUE CREDIT

## 2018-07-19 PROCEDURE — A6250 SKIN SEAL PROTECT MOISTURIZR: HCPCS

## 2018-07-19 SDOH — ECONOMIC STABILITY: HOUSING INSECURITY: UNSAFE APPLIANCES: 0

## 2018-07-19 SDOH — ECONOMIC STABILITY: HOUSING INSECURITY: UNSAFE COOKING RANGE AREA: 0

## 2018-07-19 ASSESSMENT — ACTIVITIES OF DAILY LIVING (ADL)
TOILETING_ASSISTANCE: 5
GROOMING_ASSISTANCE: 1
ORAL_CARE_ASSISTANCE: 0
DRESSING_UB_ASSISTANCE: 5
BATHING_ASSISTANCE: 5
DRESSING_LB_ASSISTANCE: 5
EATING_ASSISTANCE: 0

## 2018-07-19 ASSESSMENT — ENCOUNTER SYMPTOMS
DEPRESSED MOOD: 1
DEBILITATING PAIN: 1
VOMITING: DENIES

## 2018-07-20 PROCEDURE — 665999 HH PPS REVENUE DEBIT

## 2018-07-20 PROCEDURE — 665998 HH PPS REVENUE CREDIT

## 2018-07-20 ASSESSMENT — ENCOUNTER SYMPTOMS
DEPRESSED MOOD: 1
DEBILITATING PAIN: 1
POOR JUDGMENT: 1

## 2018-07-20 ASSESSMENT — ACTIVITIES OF DAILY LIVING (ADL)
ADLS_COMMENTS: <!--EPICS-->SEE OT REPORT<!--EPICE-->
IADLS_COMMENTS: <!--EPICS-->SEE OT REPORT<!--EPICE-->

## 2018-07-21 ENCOUNTER — HOME CARE VISIT (OUTPATIENT)
Dept: HOME HEALTH SERVICES | Facility: HOME HEALTHCARE | Age: 28
End: 2018-07-21
Payer: MEDICARE

## 2018-07-21 VITALS
TEMPERATURE: 98.5 F | OXYGEN SATURATION: 98 % | SYSTOLIC BLOOD PRESSURE: 106 MMHG | DIASTOLIC BLOOD PRESSURE: 59 MMHG | RESPIRATION RATE: 20 BRPM | HEART RATE: 73 BPM

## 2018-07-21 PROCEDURE — 665998 HH PPS REVENUE CREDIT

## 2018-07-21 PROCEDURE — 665999 HH PPS REVENUE DEBIT

## 2018-07-21 PROCEDURE — G0156 HHCP-SVS OF AIDE,EA 15 MIN: HCPCS

## 2018-07-22 PROCEDURE — 665999 HH PPS REVENUE DEBIT

## 2018-07-22 PROCEDURE — 665998 HH PPS REVENUE CREDIT

## 2018-07-23 ENCOUNTER — HOME CARE VISIT (OUTPATIENT)
Dept: HOME HEALTH SERVICES | Facility: HOME HEALTHCARE | Age: 28
End: 2018-07-23
Payer: MEDICARE

## 2018-07-23 VITALS
RESPIRATION RATE: 18 BRPM | DIASTOLIC BLOOD PRESSURE: 60 MMHG | OXYGEN SATURATION: 95 % | SYSTOLIC BLOOD PRESSURE: 110 MMHG | HEART RATE: 65 BPM | TEMPERATURE: 98.1 F

## 2018-07-23 PROCEDURE — 665998 HH PPS REVENUE CREDIT

## 2018-07-23 PROCEDURE — 665999 HH PPS REVENUE DEBIT

## 2018-07-23 PROCEDURE — G0299 HHS/HOSPICE OF RN EA 15 MIN: HCPCS

## 2018-07-23 SDOH — ECONOMIC STABILITY: HOUSING INSECURITY: UNSAFE APPLIANCES: 0

## 2018-07-23 SDOH — ECONOMIC STABILITY: HOUSING INSECURITY: UNSAFE COOKING RANGE AREA: 0

## 2018-07-23 ASSESSMENT — ENCOUNTER SYMPTOMS
DEPRESSED MOOD: 1
SEVERE DYSPNEA: 1
DEBILITATING PAIN: 1

## 2018-07-24 ENCOUNTER — HOME CARE VISIT (OUTPATIENT)
Dept: HOME HEALTH SERVICES | Facility: HOME HEALTHCARE | Age: 28
End: 2018-07-24
Payer: MEDICARE

## 2018-07-24 ENCOUNTER — APPOINTMENT (OUTPATIENT)
Dept: SLEEP MEDICINE | Facility: MEDICAL CENTER | Age: 28
End: 2018-07-24
Attending: NURSE PRACTITIONER
Payer: MEDICARE

## 2018-07-24 VITALS
HEART RATE: 67 BPM | DIASTOLIC BLOOD PRESSURE: 60 MMHG | SYSTOLIC BLOOD PRESSURE: 100 MMHG | RESPIRATION RATE: 18 BRPM | TEMPERATURE: 97.1 F | OXYGEN SATURATION: 97 %

## 2018-07-24 PROCEDURE — 665999 HH PPS REVENUE DEBIT

## 2018-07-24 PROCEDURE — G0156 HHCP-SVS OF AIDE,EA 15 MIN: HCPCS

## 2018-07-24 PROCEDURE — 665998 HH PPS REVENUE CREDIT

## 2018-07-24 PROCEDURE — G0151 HHCP-SERV OF PT,EA 15 MIN: HCPCS

## 2018-07-24 ASSESSMENT — ENCOUNTER SYMPTOMS
POOR JUDGMENT: 1
DEBILITATING PAIN: 1

## 2018-07-25 ENCOUNTER — HOME CARE VISIT (OUTPATIENT)
Dept: HOME HEALTH SERVICES | Facility: HOME HEALTHCARE | Age: 28
End: 2018-07-25
Payer: MEDICARE

## 2018-07-25 VITALS
DIASTOLIC BLOOD PRESSURE: 60 MMHG | RESPIRATION RATE: 18 BRPM | OXYGEN SATURATION: 97 % | SYSTOLIC BLOOD PRESSURE: 100 MMHG | TEMPERATURE: 97.1 F | HEART RATE: 67 BPM

## 2018-07-25 PROCEDURE — 665999 HH PPS REVENUE DEBIT

## 2018-07-25 PROCEDURE — 665998 HH PPS REVENUE CREDIT

## 2018-07-26 ENCOUNTER — HOME CARE VISIT (OUTPATIENT)
Dept: HOME HEALTH SERVICES | Facility: HOME HEALTHCARE | Age: 28
End: 2018-07-26
Payer: MEDICARE

## 2018-07-26 PROCEDURE — 665999 HH PPS REVENUE DEBIT

## 2018-07-26 PROCEDURE — G0156 HHCP-SVS OF AIDE,EA 15 MIN: HCPCS

## 2018-07-26 PROCEDURE — 665998 HH PPS REVENUE CREDIT

## 2018-07-27 ENCOUNTER — HOME CARE VISIT (OUTPATIENT)
Dept: HOME HEALTH SERVICES | Facility: HOME HEALTHCARE | Age: 28
End: 2018-07-27
Payer: MEDICARE

## 2018-07-27 VITALS
TEMPERATURE: 97.2 F | HEART RATE: 67 BPM | SYSTOLIC BLOOD PRESSURE: 108 MMHG | OXYGEN SATURATION: 97 % | DIASTOLIC BLOOD PRESSURE: 60 MMHG | RESPIRATION RATE: 18 BRPM

## 2018-07-27 VITALS
RESPIRATION RATE: 18 BRPM | TEMPERATURE: 97.2 F | OXYGEN SATURATION: 97 % | DIASTOLIC BLOOD PRESSURE: 60 MMHG | SYSTOLIC BLOOD PRESSURE: 108 MMHG | HEART RATE: 67 BPM

## 2018-07-27 VITALS
OXYGEN SATURATION: 98 % | RESPIRATION RATE: 20 BRPM | HEART RATE: 78 BPM | DIASTOLIC BLOOD PRESSURE: 78 MMHG | TEMPERATURE: 97.1 F | SYSTOLIC BLOOD PRESSURE: 112 MMHG

## 2018-07-27 PROCEDURE — G0493 RN CARE EA 15 MIN HH/HOSPICE: HCPCS

## 2018-07-27 PROCEDURE — 665998 HH PPS REVENUE CREDIT

## 2018-07-27 PROCEDURE — 665999 HH PPS REVENUE DEBIT

## 2018-07-27 PROCEDURE — G0151 HHCP-SERV OF PT,EA 15 MIN: HCPCS

## 2018-07-27 PROCEDURE — G0180 MD CERTIFICATION HHA PATIENT: HCPCS | Performed by: INTERNAL MEDICINE

## 2018-07-27 ASSESSMENT — ENCOUNTER SYMPTOMS
RESPIRATORY SYMPTOMS COMMENTS: NO CONCERNS AT THE TIME OF THIS ASSESSMENT.
NAUSEA: DENIES
DEPRESSED MOOD: 1
POOR JUDGMENT: 1
VOMITING: DENIES
DEPRESSED MOOD: 1

## 2018-07-28 PROCEDURE — 665998 HH PPS REVENUE CREDIT

## 2018-07-28 PROCEDURE — 665999 HH PPS REVENUE DEBIT

## 2018-07-29 ENCOUNTER — HOME CARE VISIT (OUTPATIENT)
Dept: HOME HEALTH SERVICES | Facility: HOME HEALTHCARE | Age: 28
End: 2018-07-29
Payer: MEDICARE

## 2018-07-29 PROCEDURE — 665998 HH PPS REVENUE CREDIT

## 2018-07-29 PROCEDURE — 665999 HH PPS REVENUE DEBIT

## 2018-07-30 ENCOUNTER — OFFICE VISIT (OUTPATIENT)
Dept: MEDICAL GROUP | Facility: MEDICAL CENTER | Age: 28
End: 2018-07-30
Payer: MEDICARE

## 2018-07-30 ENCOUNTER — HOSPITAL ENCOUNTER (EMERGENCY)
Facility: MEDICAL CENTER | Age: 28
DRG: 600 | End: 2018-07-30
Attending: EMERGENCY MEDICINE
Payer: MEDICARE

## 2018-07-30 VITALS
DIASTOLIC BLOOD PRESSURE: 72 MMHG | HEART RATE: 83 BPM | BODY MASS INDEX: 45.82 KG/M2 | RESPIRATION RATE: 16 BRPM | TEMPERATURE: 98.2 F | SYSTOLIC BLOOD PRESSURE: 122 MMHG | HEIGHT: 65 IN | OXYGEN SATURATION: 99 % | WEIGHT: 275 LBS

## 2018-07-30 VITALS
RESPIRATION RATE: 16 BRPM | DIASTOLIC BLOOD PRESSURE: 77 MMHG | WEIGHT: 270 LBS | HEART RATE: 84 BPM | HEIGHT: 65 IN | TEMPERATURE: 98.4 F | SYSTOLIC BLOOD PRESSURE: 128 MMHG | OXYGEN SATURATION: 98 % | BODY MASS INDEX: 44.98 KG/M2

## 2018-07-30 DIAGNOSIS — N61.0 CELLULITIS OF LEFT BREAST: Primary | ICD-10-CM

## 2018-07-30 DIAGNOSIS — N61.0 CELLULITIS OF FEMALE BREAST: ICD-10-CM

## 2018-07-30 LAB
ANION GAP SERPL CALC-SCNC: 11 MMOL/L (ref 0–11.9)
BASOPHILS # BLD AUTO: 0.5 % (ref 0–1.8)
BASOPHILS # BLD: 0.04 K/UL (ref 0–0.12)
BUN SERPL-MCNC: 11 MG/DL (ref 8–22)
CALCIUM SERPL-MCNC: 9.6 MG/DL (ref 8.5–10.5)
CHLORIDE SERPL-SCNC: 106 MMOL/L (ref 96–112)
CO2 SERPL-SCNC: 19 MMOL/L (ref 20–33)
CREAT SERPL-MCNC: 0.64 MG/DL (ref 0.5–1.4)
EOSINOPHIL # BLD AUTO: 0.17 K/UL (ref 0–0.51)
EOSINOPHIL NFR BLD: 2.1 % (ref 0–6.9)
ERYTHROCYTE [DISTWIDTH] IN BLOOD BY AUTOMATED COUNT: 41.2 FL (ref 35.9–50)
GLUCOSE SERPL-MCNC: 111 MG/DL (ref 65–99)
HCT VFR BLD AUTO: 39.4 % (ref 37–47)
HGB BLD-MCNC: 13.3 G/DL (ref 12–16)
IMM GRANULOCYTES # BLD AUTO: 0.06 K/UL (ref 0–0.11)
IMM GRANULOCYTES NFR BLD AUTO: 0.8 % (ref 0–0.9)
LACTATE BLD-SCNC: 2.5 MMOL/L (ref 0.5–2)
LYMPHOCYTES # BLD AUTO: 2.26 K/UL (ref 1–4.8)
LYMPHOCYTES NFR BLD: 28.6 % (ref 22–41)
MCH RBC QN AUTO: 30.8 PG (ref 27–33)
MCHC RBC AUTO-ENTMCNC: 33.8 G/DL (ref 33.6–35)
MCV RBC AUTO: 91.2 FL (ref 81.4–97.8)
MONOCYTES # BLD AUTO: 0.66 K/UL (ref 0–0.85)
MONOCYTES NFR BLD AUTO: 8.3 % (ref 0–13.4)
NEUTROPHILS # BLD AUTO: 4.72 K/UL (ref 2–7.15)
NEUTROPHILS NFR BLD: 59.7 % (ref 44–72)
NRBC # BLD AUTO: 0 K/UL
NRBC BLD-RTO: 0 /100 WBC
PLATELET # BLD AUTO: 186 K/UL (ref 164–446)
PMV BLD AUTO: 11 FL (ref 9–12.9)
POTASSIUM SERPL-SCNC: 3.8 MMOL/L (ref 3.6–5.5)
RBC # BLD AUTO: 4.32 M/UL (ref 4.2–5.4)
SODIUM SERPL-SCNC: 136 MMOL/L (ref 135–145)
WBC # BLD AUTO: 7.9 K/UL (ref 4.8–10.8)

## 2018-07-30 PROCEDURE — 80048 BASIC METABOLIC PNL TOTAL CA: CPT

## 2018-07-30 PROCEDURE — 700101 HCHG RX REV CODE 250: Performed by: EMERGENCY MEDICINE

## 2018-07-30 PROCEDURE — 665998 HH PPS REVENUE CREDIT

## 2018-07-30 PROCEDURE — 700111 HCHG RX REV CODE 636 W/ 250 OVERRIDE (IP): Performed by: EMERGENCY MEDICINE

## 2018-07-30 PROCEDURE — 665999 HH PPS REVENUE DEBIT

## 2018-07-30 PROCEDURE — 96376 TX/PRO/DX INJ SAME DRUG ADON: CPT

## 2018-07-30 PROCEDURE — 99214 OFFICE O/P EST MOD 30 MIN: CPT | Performed by: NURSE PRACTITIONER

## 2018-07-30 PROCEDURE — 83605 ASSAY OF LACTIC ACID: CPT

## 2018-07-30 PROCEDURE — 99284 EMERGENCY DEPT VISIT MOD MDM: CPT

## 2018-07-30 PROCEDURE — 96365 THER/PROPH/DIAG IV INF INIT: CPT

## 2018-07-30 PROCEDURE — 85025 COMPLETE CBC W/AUTO DIFF WBC: CPT

## 2018-07-30 PROCEDURE — 96375 TX/PRO/DX INJ NEW DRUG ADDON: CPT

## 2018-07-30 RX ORDER — CLINDAMYCIN HYDROCHLORIDE 300 MG/1
300 CAPSULE ORAL 3 TIMES DAILY
Qty: 30 CAP | Refills: 0 | Status: SHIPPED | OUTPATIENT
Start: 2018-07-30 | End: 2018-08-02

## 2018-07-30 RX ORDER — ONDANSETRON 2 MG/ML
4 INJECTION INTRAMUSCULAR; INTRAVENOUS ONCE
Status: DISCONTINUED | OUTPATIENT
Start: 2018-07-30 | End: 2018-07-30 | Stop reason: HOSPADM

## 2018-07-30 RX ORDER — CLINDAMYCIN PHOSPHATE 600 MG/50ML
600 INJECTION, SOLUTION INTRAVENOUS ONCE
Status: COMPLETED | OUTPATIENT
Start: 2018-07-30 | End: 2018-07-30

## 2018-07-30 RX ORDER — MORPHINE SULFATE 4 MG/ML
4 INJECTION, SOLUTION INTRAMUSCULAR; INTRAVENOUS ONCE
Status: COMPLETED | OUTPATIENT
Start: 2018-07-30 | End: 2018-07-30

## 2018-07-30 RX ORDER — ONDANSETRON 2 MG/ML
4 INJECTION INTRAMUSCULAR; INTRAVENOUS ONCE
Status: COMPLETED | OUTPATIENT
Start: 2018-07-30 | End: 2018-07-30

## 2018-07-30 RX ORDER — HYDROCODONE BITARTRATE AND ACETAMINOPHEN 5; 325 MG/1; MG/1
1 TABLET ORAL EVERY 6 HOURS PRN
Qty: 14 TAB | Refills: 0 | Status: ON HOLD | OUTPATIENT
Start: 2018-07-30 | End: 2018-08-08

## 2018-07-30 RX ADMIN — MORPHINE SULFATE 4 MG: 4 INJECTION INTRAVENOUS at 18:53

## 2018-07-30 RX ADMIN — CLINDAMYCIN IN 5 PERCENT DEXTROSE 600 MG: 12 INJECTION, SOLUTION INTRAVENOUS at 18:39

## 2018-07-30 RX ADMIN — ONDANSETRON 4 MG: 2 INJECTION, SOLUTION INTRAMUSCULAR; INTRAVENOUS at 18:39

## 2018-07-30 ASSESSMENT — PAIN SCALES - GENERAL
PAINLEVEL_OUTOF10: 2
PAINLEVEL_OUTOF10: 7

## 2018-07-30 ASSESSMENT — LIFESTYLE VARIABLES: DO YOU DRINK ALCOHOL: NO

## 2018-07-30 NOTE — ED TRIAGE NOTES
Chief Complaint   Patient presents with   • Wound Infection     L breast    Pt states redness around wound is increasing

## 2018-07-30 NOTE — PROGRESS NOTES
Subjective:      Kristin Balderrama is a 27 y.o. female who presents with Other (breast wound not getting better)        CC: Patient is here today for left breast infection.    HPI Kristin Balderrama states her symptoms started about 3 weeks ago with a pimple-like area on her left breast. She states it opened and drained and she has been treating by applying alcohol and Neosporin to the area. It has progressively developed more redness and there has been scabbing of the area. There has been no nipple discharge. She states she has had similar infections in the past for which she has needed to go on antibiotics. She is a diabetic and is not always well controlled as well. She denies fever, chills, nipple discharge or masses of the breast. Nothing seems to make it better or worse.        Social History   Substance Use Topics   • Smoking status: Never Smoker   • Smokeless tobacco: Never Used   • Alcohol use No     Current Outpatient Prescriptions   Medication Sig Dispense Refill   • clindamycin (CLEOCIN) 300 MG Cap Take 1 Cap by mouth 3 times a day for 10 days. 30 Cap 0   • non-formulary med Spray 3 L/min in nose Continuous.     • baclofen (LIORESAL) 10 MG Tab Take 10 mg by mouth 2 times a day.     • gabapentin (NEURONTIN) 600 MG tablet Take 600-1,200 mg by mouth 2 times a day. 600 MG IN MORNING  1200 MG AT NIGHT      • liraglutide (VICTOZA) 18 MG/3ML Solution Pen-injector injection Inject 1.8 mg as instructed every day.     • ziprasidone (GEODON) 80 MG Cap Take 80 mg by mouth 2 Times a Day.     • cholestyramine (QUESTRAN) 4 g packet Take 1 Packet by mouth every day.     • prednisoLONE acetate (PRED FORTE) 1 % Suspension Place 1 Drop in right eye every 2 hours as needed (optic neurtis).     • Cholecalciferol (VITAMIN D3) 37135 units Cap Take 1 Each by mouth every 7 days. 4 Cap 5   • SITagliptin (JANUVIA) 100 MG Tab Take 1 Tab by mouth every day. 30 Tab 3   • promethazine (PHENERGAN) 25 MG Suppos Insert 1 Suppository  "in rectum every 6 hours as needed for Nausea/Vomiting. 10 Suppository 0   • traZODone (DESYREL) 100 MG Tab Take 1 Tab by mouth every bedtime. 90 Tab 3   • busPIRone (BUSPAR) 5 MG Tab Take 1 Tab by mouth 2 times a day. 60 Tab 5   • mupirocin (BACTROBAN) 2 % Ointment Apply BID to infected sores x 1week 30 g 0   • ivabradine (CORLANOR) 5 MG Tab tablet Take 1 Tab by mouth 2 times a day, with meals. 60 Tab 11   • sodium bicarbonate (SODIUM BICARBONATE) 650 MG Tab Take 650 mg by mouth 2 times a day.     • fluoxetine (PROZAC) 10 MG Cap TAKE ONE CAPSULE BY MOUTH ONCE A DAY 30 Cap 11   • aspirin EC (ECOTRIN) 81 MG Tablet Delayed Response Take 1 Tab by mouth every day. 30 Tab 6   • Melatonin 5 MG Tab Take 10 mg by mouth every bedtime (for sleep).     • albuterol 108 (90 Base) MCG/ACT Aero Soln inhalation aerosol Inhale 2 Puffs by mouth every 6 hours as needed for Shortness of Breath. 8.5 g 1     No current facility-administered medications for this visit.      Family History   Problem Relation Age of Onset   • Hypertension Mother    • Sleep Apnea Mother    • Heart Disease Mother    • Other Mother         hypothryod   • Hypertension Maternal Uncle    • Heart Disease Maternal Grandmother    • Hypertension Maternal Grandmother    • No Known Problems Sister    • Other Sister         Narcolepsy;fibromyalsia;bone;nerve   • Genitourinary () Sister         endometriosis     Past Medical History:   Diagnosis Date   • Abdominal pain    • Anginal syndrome     random chest pain especially with tachycardia   • Apnea, sleep    • Arrhythmia     \"sinus tachycardia\", cariologist, Dr. Kumar; ablation 2/2016   • Arthritis     osteo   • ASTHMA     when around smoke   • Atrial fibrillation (HCC)    • Back pain    • Borderline personality disorder    • Breath shortness     with tachycardia   • Cardiac arrhythmia    • Chickenpox    • Chronic UTI 9/18/2010   • Cough    • Daytime sleepiness    • Depression    • Diabetes (HCC)    • Diarrhea    • " "Disorder of thyroid    • Fall    • Fatigue    • Frequent headaches    • Gasping for breath    • Gynecological disorder     PCOS   • Headache(784.0)    • Heart burn    • History of falling    • Hypertension    • Migraine    • 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HCC)    • Multiple personality disorder    • Nausea    • Obesity    • Pain 08-15-12    back, 7/10   • Painful joint    • PCOS (polycystic ovarian syndrome)    • Pneumonia 2012   • Psychosis    • Renal disorder     \"kidney disease, stage 1\" nephrologist, Dr. 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   • Urinary bladder disorder     Suprapubic cath   • Urinary incontinence    • Weakness    • Wears glasses        Review of Systems   Skin: Positive for rash.   All other systems reviewed and are negative.         Objective:     /72   Pulse 83   Temp 36.8 °C (98.2 °F)   Resp 16   Ht 1.651 m (5' 5\")   Wt 124.7 kg (275 lb)   SpO2 99%   BMI 45.76 kg/m²      Physical Exam   Constitutional: She appears well-developed and well-nourished. No distress.   HENT:   Head: Normocephalic and atraumatic.   Right Ear: External ear normal.   Left Ear: External ear normal.   Eyes: Pupils are equal, round, and reactive to light. EOM are normal. Right eye exhibits no discharge. Left eye exhibits no discharge.   Neurological: She is alert.   Skin: Skin is dry. Rash noted. She is not diaphoretic.   There are approximately 3 scabbed areas on patient's left breast beneath the nipple and there is surrounding erythema at approximately 8 x 7 cm. There is no discharge or significant swelling. There is no nipple discharge.   Psychiatric: She has a normal mood and affect. Her behavior is normal. Judgment normal.   Nursing note and vitals reviewed.              Assessment/Plan: "     1. Cellulitis of female breast  Patient is afebrile and otherwise appears her normal self. I will start her on clindamycin and she is allergic to multiple antibiotics including doxycycline and Septra DS. She has tolerated clindamycin in the past and I advised over-the-counter probiotics while taking the medicine. She will stop it if it causes severe diarrhea. She was advised to stop using alcohol and Neosporin to the area. I suggested keeping it clean with soap and water. If it worsens over the next 24-48 hours, she is to go to the emergency room.  - clindamycin (CLEOCIN) 300 MG Cap; Take 1 Cap by mouth 3 times a day for 10 days.  Dispense: 30 Cap; Refill: 0

## 2018-07-31 ENCOUNTER — PATIENT OUTREACH (OUTPATIENT)
Dept: HEALTH INFORMATION MANAGEMENT | Facility: OTHER | Age: 28
End: 2018-07-31

## 2018-07-31 PROCEDURE — 665998 HH PPS REVENUE CREDIT

## 2018-07-31 PROCEDURE — 665999 HH PPS REVENUE DEBIT

## 2018-07-31 NOTE — ED PROVIDER NOTES
ED Provider Note    HPI: Patient is a 27-year-old female who presented to the emergency department July 30, 2018 at 3:59 PM with a chief complaint of left breast infection.    Patient was seen by primary care provider today and started on clindamycin.  She has had one dose.  Patient presented because of increasing redness.  She has had no nausea vomiting.  She denied fever chills or cough.  No fluctuance or drainage.  No shortness of breath.  Patient has a complex past medical history (see below) no other somatic complaints    Review of Systems: Positive left breast pain and erythema negative for nausea vomiting fever chills cough fluctuant drainage shortness of breath.  Review of systems reviewed with patient, all other systems negative    Past medical/surgical history: Scoliosis multiple personality disorder chronic UTI chronic back pain urinary incontinence hypertension obesity pneumonia asthma shortness of breath anginal syndrome psychosis osteoarthritis polycystic ovarian syndrome stage I kidney disease tuberculosis sleep apnea borderline personality disorder atrial fibrillation depression diabetes chickenpox fatigue frequent headaches cholecystectomy tonsillectomy    Allergies: Cefdinir Depakote doxycycline Abilify amitriptyline amoxicillin ciprofloxacin and clindamycin erythromycin Flagyl Flomax metformin sulfa tape vancomycin Keflex erythromycin levofloxacin Flagyl valproic acid    Medications: Clindamycin (first dose today) Neurontin victoza Geodon Questran Januvia trazodone Phenergan BuSpar    Social History: No history of smoking no alcohol use      Physical exam: Constitutional: Pleasant female awake alert  Vital signs: Temperature 98.2 pulse 87 respirations 16 blood pressure 137/86 pulse oximetry 96%  EYES: PERRL, EOMI, Conjunctivae and sclera normal, eyelids normal bilaterally.  Neck: Trachea midline. No cervical masses seen or palpated. Normal range of motion, supple. No meningeal signs  elicited.  Cardiac: Regular rate and rhythm. S1-S2 present. No S3 or S4 present. No murmurs, rubs, or gallops heard. No edema or varicosities were seen.   Lungs: Clear to auscultation with good aeration throughout. No wheezes, rales, or rhonchi heard. Patient's chest wall moved symmetrically with each respiratory effort. Patient was not making use of accessory muscles of respiration in breathing.  Abdomen: Soft nontender to palpation. No rebound or guarding elicited. No organomegaly identified. No pulsatile abdominal masses identified.   Musculoskeletal:  no  pain with palpitation or movement of muscle, bone or joint , no obvious musculoskeletal deformities identified.  Neurologic: alert and awake answers questions appropriately. Moves all four extremities independently, no gross focal abnormalities identified. Normal strength and motor.  Skin: Patient's left breast has no apparent cellulitic area perhaps 1 inch in diameter with a couple small satellite crusty type lesions.  Additionally, there are several small crusted type lesions medially.  With the exception of the small crusty lesions medially, the area is well within the area demarcated earlier today.  No drainage is seen.  I cannot palpate any fluctuance.  Psychiatric: Patient appeared to be somewhat anxious.  She was not delusional or hallucinating    Medical decision making: Patient patient given a dose of IV clindamycin    Laboratory studies obtained (please see lab sheet for all results) significant findings included normal white count.  Chemistry panel essentially unremarkable.  Minimal decrease in bicarbonate 19 is noted.  Patient's lactic acid minimally elevated at 2.5.    Given the normal blood pressure I doubt the patient is septic.  She will be discharged to continue her oral antibiotic regimen.  She is given a prescription for a small amount of pain medication (see below)She is advised to apply heat to the infected area.  She is to follow-up with  her primary care provider for recheck in 48-72 hours.  She is carefully counseled to return to ED for increasing pain redness drainage or any other problems    Patient verbalized understanding of these instructions and states she will comply    I reviewed prescription monitoring program for patient's narcotic use before prescribing a scheduled drug.The patient will not drink alcohol nor drive with prescribed medications. The patient will return for new or worsening symptoms and is stable at the time of discharge.        In prescribing controlled substances to this patient, I certify that I have obtained and reviewed the medical history of . I have also made a good bhavna effort to obtain applicable records from other providers who have treated the patient and records did not demonstrate any increased risk of substance abuse that would prevent me from prescribing controlled substances.      I have conducted a physical exam and documented it. I have reviewed Ms. Kristin Balderrama' prescription history as maintained by the [place] Prescription Monitoring Program.      I have assessed the patient’s risk for abuse, dependency, and addiction using the validated Opioid Risk Tool available at https://www.mdcalc.com/vkkjee-mjft-swvd-ort-narcotic-abuse.      Given the above, I believe the benefits of controlled substance therapy outweigh the risks. The reasons for prescribing controlled substances include in my professional opinion, controlled substances are the only reasonable choice for this patientAccordingly, I have discussed the risk and benefits, treatment plan, and alternative therapies with the patient.         Impression left breast cellulitis

## 2018-07-31 NOTE — ED NOTES
Assumed care of pt from waiting room. Pt to room via WC.  Changed to gown. Fall precautions in place. Call bell in reach. Family at bedside. Awaiting MD eval/orders. Ongoing monitoring.

## 2018-08-01 ENCOUNTER — HOME CARE VISIT (OUTPATIENT)
Dept: HOME HEALTH SERVICES | Facility: HOME HEALTHCARE | Age: 28
End: 2018-08-01
Payer: MEDICARE

## 2018-08-01 VITALS
RESPIRATION RATE: 17 BRPM | OXYGEN SATURATION: 98 % | TEMPERATURE: 97.2 F | HEART RATE: 77 BPM | DIASTOLIC BLOOD PRESSURE: 70 MMHG | SYSTOLIC BLOOD PRESSURE: 106 MMHG

## 2018-08-01 PROCEDURE — 665998 HH PPS REVENUE CREDIT

## 2018-08-01 PROCEDURE — G0151 HHCP-SERV OF PT,EA 15 MIN: HCPCS

## 2018-08-01 PROCEDURE — 665999 HH PPS REVENUE DEBIT

## 2018-08-01 PROCEDURE — G0156 HHCP-SVS OF AIDE,EA 15 MIN: HCPCS

## 2018-08-01 ASSESSMENT — ENCOUNTER SYMPTOMS: DEPRESSED MOOD: 1

## 2018-08-02 ENCOUNTER — HOME CARE VISIT (OUTPATIENT)
Dept: HOME HEALTH SERVICES | Facility: HOME HEALTHCARE | Age: 28
End: 2018-08-02

## 2018-08-02 ENCOUNTER — HOME CARE VISIT (OUTPATIENT)
Dept: HOME HEALTH SERVICES | Facility: HOME HEALTHCARE | Age: 28
End: 2018-08-02
Payer: MEDICARE

## 2018-08-02 ENCOUNTER — HOSPITAL ENCOUNTER (INPATIENT)
Facility: MEDICAL CENTER | Age: 28
LOS: 6 days | DRG: 600 | End: 2018-08-08
Attending: EMERGENCY MEDICINE | Admitting: HOSPITALIST
Payer: MEDICARE

## 2018-08-02 VITALS
OXYGEN SATURATION: 98 % | TEMPERATURE: 97.2 F | HEART RATE: 77 BPM | RESPIRATION RATE: 17 BRPM | DIASTOLIC BLOOD PRESSURE: 70 MMHG | SYSTOLIC BLOOD PRESSURE: 106 MMHG

## 2018-08-02 DIAGNOSIS — F60.3 BORDERLINE PERSONALITY DISORDER IN ADULT (HCC): ICD-10-CM

## 2018-08-02 DIAGNOSIS — Z99.81 ON HOME OXYGEN THERAPY: Chronic | ICD-10-CM

## 2018-08-02 DIAGNOSIS — G47.33 OSA (OBSTRUCTIVE SLEEP APNEA): ICD-10-CM

## 2018-08-02 DIAGNOSIS — R53.1 PROGRESSIVE FOCAL MOTOR WEAKNESS: ICD-10-CM

## 2018-08-02 DIAGNOSIS — F20.89 PSYCHOSIS, SCHIZOPHRENIA, SIMPLE (HCC): Chronic | ICD-10-CM

## 2018-08-02 DIAGNOSIS — M41.9 SCOLIOSIS, UNSPECIFIED SCOLIOSIS TYPE, UNSPECIFIED SPINAL REGION: ICD-10-CM

## 2018-08-02 DIAGNOSIS — E11.9 TYPE 2 DIABETES MELLITUS WITHOUT COMPLICATION, WITHOUT LONG-TERM CURRENT USE OF INSULIN (HCC): ICD-10-CM

## 2018-08-02 DIAGNOSIS — N61.0 CELLULITIS OF LEFT BREAST: ICD-10-CM

## 2018-08-02 LAB
ALBUMIN SERPL BCP-MCNC: 4.6 G/DL (ref 3.2–4.9)
ALBUMIN/GLOB SERPL: 2.3 G/DL
ALP SERPL-CCNC: 56 U/L (ref 30–99)
ALT SERPL-CCNC: 74 U/L (ref 2–50)
ANION GAP SERPL CALC-SCNC: 10 MMOL/L (ref 0–11.9)
APPEARANCE UR: CLEAR
AST SERPL-CCNC: 39 U/L (ref 12–45)
BACTERIA #/AREA URNS HPF: ABNORMAL /HPF
BASOPHILS # BLD AUTO: 0.6 % (ref 0–1.8)
BASOPHILS # BLD: 0.04 K/UL (ref 0–0.12)
BILIRUB SERPL-MCNC: 0.6 MG/DL (ref 0.1–1.5)
BILIRUB UR QL STRIP.AUTO: NEGATIVE
BUN SERPL-MCNC: 8 MG/DL (ref 8–22)
CALCIUM SERPL-MCNC: 9.7 MG/DL (ref 8.5–10.5)
CHLORIDE SERPL-SCNC: 106 MMOL/L (ref 96–112)
CO2 SERPL-SCNC: 23 MMOL/L (ref 20–33)
COLOR UR: YELLOW
CREAT SERPL-MCNC: 0.8 MG/DL (ref 0.5–1.4)
EOSINOPHIL # BLD AUTO: 0.11 K/UL (ref 0–0.51)
EOSINOPHIL NFR BLD: 1.7 % (ref 0–6.9)
EPI CELLS #/AREA URNS HPF: ABNORMAL /HPF
ERYTHROCYTE [DISTWIDTH] IN BLOOD BY AUTOMATED COUNT: 40.8 FL (ref 35.9–50)
GLOBULIN SER CALC-MCNC: 2 G/DL (ref 1.9–3.5)
GLUCOSE SERPL-MCNC: 109 MG/DL (ref 65–99)
GLUCOSE UR STRIP.AUTO-MCNC: NEGATIVE MG/DL
HCT VFR BLD AUTO: 40.1 % (ref 37–47)
HGB BLD-MCNC: 13.9 G/DL (ref 12–16)
HYALINE CASTS #/AREA URNS LPF: ABNORMAL /LPF
IMM GRANULOCYTES # BLD AUTO: 0.03 K/UL (ref 0–0.11)
IMM GRANULOCYTES NFR BLD AUTO: 0.5 % (ref 0–0.9)
KETONES UR STRIP.AUTO-MCNC: NEGATIVE MG/DL
LEUKOCYTE ESTERASE UR QL STRIP.AUTO: ABNORMAL
LYMPHOCYTES # BLD AUTO: 1.83 K/UL (ref 1–4.8)
LYMPHOCYTES NFR BLD: 28.4 % (ref 22–41)
MCH RBC QN AUTO: 31 PG (ref 27–33)
MCHC RBC AUTO-ENTMCNC: 34.7 G/DL (ref 33.6–35)
MCV RBC AUTO: 89.5 FL (ref 81.4–97.8)
MICRO URNS: ABNORMAL
MONOCYTES # BLD AUTO: 0.41 K/UL (ref 0–0.85)
MONOCYTES NFR BLD AUTO: 6.4 % (ref 0–13.4)
NEUTROPHILS # BLD AUTO: 4.02 K/UL (ref 2–7.15)
NEUTROPHILS NFR BLD: 62.4 % (ref 44–72)
NITRITE UR QL STRIP.AUTO: NEGATIVE
NRBC # BLD AUTO: 0 K/UL
NRBC BLD-RTO: 0 /100 WBC
PH UR STRIP.AUTO: 8.5 [PH]
PLATELET # BLD AUTO: 185 K/UL (ref 164–446)
PMV BLD AUTO: 11.1 FL (ref 9–12.9)
POTASSIUM SERPL-SCNC: 3.9 MMOL/L (ref 3.6–5.5)
PROT SERPL-MCNC: 6.6 G/DL (ref 6–8.2)
PROT UR QL STRIP: NEGATIVE MG/DL
RBC # BLD AUTO: 4.48 M/UL (ref 4.2–5.4)
RBC # URNS HPF: ABNORMAL /HPF
RBC UR QL AUTO: NEGATIVE
SODIUM SERPL-SCNC: 139 MMOL/L (ref 135–145)
SP GR UR STRIP.AUTO: 1.02
UROBILINOGEN UR STRIP.AUTO-MCNC: 1 MG/DL
WBC # BLD AUTO: 6.4 K/UL (ref 4.8–10.8)
WBC #/AREA URNS HPF: ABNORMAL /HPF

## 2018-08-02 PROCEDURE — 770006 HCHG ROOM/CARE - MED/SURG/GYN SEMI*

## 2018-08-02 PROCEDURE — 80053 COMPREHEN METABOLIC PANEL: CPT

## 2018-08-02 PROCEDURE — 700102 HCHG RX REV CODE 250 W/ 637 OVERRIDE(OP): Performed by: EMERGENCY MEDICINE

## 2018-08-02 PROCEDURE — 99223 1ST HOSP IP/OBS HIGH 75: CPT | Performed by: HOSPITALIST

## 2018-08-02 PROCEDURE — 36415 COLL VENOUS BLD VENIPUNCTURE: CPT

## 2018-08-02 PROCEDURE — 87086 URINE CULTURE/COLONY COUNT: CPT

## 2018-08-02 PROCEDURE — 665998 HH PPS REVENUE CREDIT

## 2018-08-02 PROCEDURE — 99285 EMERGENCY DEPT VISIT HI MDM: CPT

## 2018-08-02 PROCEDURE — A9270 NON-COVERED ITEM OR SERVICE: HCPCS | Performed by: EMERGENCY MEDICINE

## 2018-08-02 PROCEDURE — G0299 HHS/HOSPICE OF RN EA 15 MIN: HCPCS

## 2018-08-02 PROCEDURE — A9270 NON-COVERED ITEM OR SERVICE: HCPCS | Performed by: HOSPITALIST

## 2018-08-02 PROCEDURE — 700102 HCHG RX REV CODE 250 W/ 637 OVERRIDE(OP): Performed by: HOSPITALIST

## 2018-08-02 PROCEDURE — 85025 COMPLETE CBC W/AUTO DIFF WBC: CPT

## 2018-08-02 PROCEDURE — 87040 BLOOD CULTURE FOR BACTERIA: CPT

## 2018-08-02 PROCEDURE — 96365 THER/PROPH/DIAG IV INF INIT: CPT

## 2018-08-02 PROCEDURE — 700101 HCHG RX REV CODE 250: Performed by: EMERGENCY MEDICINE

## 2018-08-02 PROCEDURE — 96367 TX/PROPH/DG ADDL SEQ IV INF: CPT

## 2018-08-02 PROCEDURE — 665999 HH PPS REVENUE DEBIT

## 2018-08-02 PROCEDURE — 700111 HCHG RX REV CODE 636 W/ 250 OVERRIDE (IP): Performed by: EMERGENCY MEDICINE

## 2018-08-02 PROCEDURE — 81001 URINALYSIS AUTO W/SCOPE: CPT

## 2018-08-02 RX ORDER — GABAPENTIN 300 MG/1
600 CAPSULE ORAL DAILY
Status: DISCONTINUED | OUTPATIENT
Start: 2018-08-03 | End: 2018-08-08 | Stop reason: HOSPADM

## 2018-08-02 RX ORDER — CLINDAMYCIN HYDROCHLORIDE 300 MG/1
300 CAPSULE ORAL 3 TIMES DAILY
Status: ON HOLD | COMMUNITY
Start: 2018-07-30 | End: 2018-08-08

## 2018-08-02 RX ORDER — ACETAMINOPHEN 325 MG/1
650 TABLET ORAL EVERY 6 HOURS PRN
Status: DISCONTINUED | OUTPATIENT
Start: 2018-08-02 | End: 2018-08-08 | Stop reason: HOSPADM

## 2018-08-02 RX ORDER — CEFTRIAXONE SODIUM 2 G/50ML
2 INJECTION, SOLUTION INTRAVENOUS ONCE
Status: COMPLETED | OUTPATIENT
Start: 2018-08-02 | End: 2018-08-02

## 2018-08-02 RX ORDER — CLINDAMYCIN PHOSPHATE 600 MG/50ML
600 INJECTION, SOLUTION INTRAVENOUS ONCE
Status: COMPLETED | OUTPATIENT
Start: 2018-08-02 | End: 2018-08-02

## 2018-08-02 RX ORDER — DEXTROSE MONOHYDRATE 25 G/50ML
25 INJECTION, SOLUTION INTRAVENOUS
Status: DISCONTINUED | OUTPATIENT
Start: 2018-08-02 | End: 2018-08-08 | Stop reason: HOSPADM

## 2018-08-02 RX ORDER — HYDROCODONE BITARTRATE AND ACETAMINOPHEN 5; 325 MG/1; MG/1
1 TABLET ORAL EVERY 6 HOURS PRN
Status: DISCONTINUED | OUTPATIENT
Start: 2018-08-02 | End: 2018-08-08 | Stop reason: HOSPADM

## 2018-08-02 RX ORDER — LINEZOLID 600 MG/1
600 TABLET, FILM COATED ORAL ONCE
Status: COMPLETED | OUTPATIENT
Start: 2018-08-02 | End: 2018-08-02

## 2018-08-02 RX ORDER — GABAPENTIN 400 MG/1
1200 CAPSULE ORAL EVERY EVENING
Status: DISCONTINUED | OUTPATIENT
Start: 2018-08-02 | End: 2018-08-08 | Stop reason: HOSPADM

## 2018-08-02 RX ORDER — POLYETHYLENE GLYCOL 3350 17 G/17G
1 POWDER, FOR SOLUTION ORAL
Status: DISCONTINUED | OUTPATIENT
Start: 2018-08-02 | End: 2018-08-07

## 2018-08-02 RX ORDER — FLUOXETINE 10 MG/1
10 CAPSULE ORAL DAILY
Status: DISCONTINUED | OUTPATIENT
Start: 2018-08-03 | End: 2018-08-08 | Stop reason: HOSPADM

## 2018-08-02 RX ORDER — BISACODYL 10 MG
10 SUPPOSITORY, RECTAL RECTAL
Status: DISCONTINUED | OUTPATIENT
Start: 2018-08-02 | End: 2018-08-07

## 2018-08-02 RX ORDER — PREDNISOLONE ACETATE 10 MG/ML
1 SUSPENSION/ DROPS OPHTHALMIC
Status: DISCONTINUED | OUTPATIENT
Start: 2018-08-02 | End: 2018-08-08 | Stop reason: HOSPADM

## 2018-08-02 RX ORDER — ZIPRASIDONE HYDROCHLORIDE 80 MG/1
80 CAPSULE ORAL 2 TIMES DAILY
Status: DISCONTINUED | OUTPATIENT
Start: 2018-08-02 | End: 2018-08-08 | Stop reason: HOSPADM

## 2018-08-02 RX ORDER — GABAPENTIN 600 MG/1
600-1200 TABLET ORAL 2 TIMES DAILY
Status: DISCONTINUED | OUTPATIENT
Start: 2018-08-02 | End: 2018-08-02

## 2018-08-02 RX ORDER — BACLOFEN 10 MG/1
10 TABLET ORAL 2 TIMES DAILY
Status: DISCONTINUED | OUTPATIENT
Start: 2018-08-02 | End: 2018-08-08 | Stop reason: HOSPADM

## 2018-08-02 RX ORDER — BUSPIRONE HYDROCHLORIDE 10 MG/1
5 TABLET ORAL 2 TIMES DAILY
Status: DISCONTINUED | OUTPATIENT
Start: 2018-08-02 | End: 2018-08-03

## 2018-08-02 RX ORDER — SODIUM BICARBONATE 650 MG/1
650 TABLET ORAL 2 TIMES DAILY
Status: DISCONTINUED | OUTPATIENT
Start: 2018-08-02 | End: 2018-08-08 | Stop reason: HOSPADM

## 2018-08-02 RX ORDER — ACETAMINOPHEN 325 MG/1
650 TABLET ORAL ONCE
Status: COMPLETED | OUTPATIENT
Start: 2018-08-02 | End: 2018-08-02

## 2018-08-02 RX ORDER — AMOXICILLIN 250 MG
2 CAPSULE ORAL 2 TIMES DAILY
Status: DISCONTINUED | OUTPATIENT
Start: 2018-08-02 | End: 2018-08-07

## 2018-08-02 RX ORDER — CHOLECALCIFEROL (VITAMIN D3) 125 MCG
10 CAPSULE ORAL
Status: DISCONTINUED | OUTPATIENT
Start: 2018-08-02 | End: 2018-08-02

## 2018-08-02 RX ORDER — TRAZODONE HYDROCHLORIDE 50 MG/1
100 TABLET ORAL
Status: DISCONTINUED | OUTPATIENT
Start: 2018-08-02 | End: 2018-08-08 | Stop reason: HOSPADM

## 2018-08-02 RX ORDER — CHOLESTYRAMINE LIGHT 4 G/5.7G
1 POWDER, FOR SUSPENSION ORAL DAILY
Status: DISCONTINUED | OUTPATIENT
Start: 2018-08-03 | End: 2018-08-08 | Stop reason: HOSPADM

## 2018-08-02 RX ADMIN — ACETAMINOPHEN 650 MG: 325 TABLET, FILM COATED ORAL at 17:42

## 2018-08-02 RX ADMIN — CLINDAMYCIN IN 5 PERCENT DEXTROSE 600 MG: 12 INJECTION, SOLUTION INTRAVENOUS at 13:03

## 2018-08-02 RX ADMIN — LINEZOLID 600 MG: 600 TABLET, FILM COATED ORAL at 17:47

## 2018-08-02 RX ADMIN — TRAZODONE HYDROCHLORIDE 100 MG: 50 TABLET ORAL at 21:56

## 2018-08-02 RX ADMIN — SENNOSIDES AND DOCUSATE SODIUM 2 TABLET: 8.6; 5 TABLET ORAL at 21:53

## 2018-08-02 RX ADMIN — CEFTRIAXONE SODIUM 2 G: 2 INJECTION, SOLUTION INTRAVENOUS at 17:46

## 2018-08-02 RX ADMIN — ZIPRASIDONE HCL 80 MG: 40 CAPSULE ORAL at 21:55

## 2018-08-02 RX ADMIN — GABAPENTIN 1200 MG: 400 CAPSULE ORAL at 21:56

## 2018-08-02 RX ADMIN — BUSPIRONE HYDROCHLORIDE 5 MG: 10 TABLET ORAL at 21:58

## 2018-08-02 RX ADMIN — BACLOFEN 10 MG: 10 TABLET ORAL at 21:55

## 2018-08-02 RX ADMIN — SODIUM BICARBONATE 650 MG: 650 TABLET ORAL at 21:58

## 2018-08-02 ASSESSMENT — PAIN SCALES - GENERAL: PAINLEVEL_OUTOF10: 9

## 2018-08-02 NOTE — ED TRIAGE NOTES
"Kristin Balderrama  Chief Complaint   Patient presents with   • Wound Re-Check   • Body Aches   • N/V     Pt w/c to triage with above complaint. Pt was seen in this ED Monday for LEFT breast wound. Pt was IV abx and discharged with PO abx. Pt reports white drainage from wound this morning. Pt also report N/V, generalized body aches, and chills. Pt reports fever at home. Pt 98.9 in triage, pt denies taking fever reducer meds pta. Pt denies blood in vomit.     /90   Pulse 77   Temp 37.2 °C (98.9 °F)   Resp 18   Ht 1.651 m (5' 5\")   Wt 122.5 kg (270 lb)   SpO2 94%   BMI 44.93 kg/m²     Pt informed of triage process and encouraged to notify staff of any changes or concerns. Pt verbalized understanding of instructions. Pt placed back in lobby.     "

## 2018-08-02 NOTE — ED PROVIDER NOTES
"ED Provider Note    Scribed for Jim Sandhu D.O. by Jericho Espino. 8/2/2018  12:01 PM    Primary care provider: Torres Brody M.D.  Means of arrival: Wheel chair  History obtained from: Patient  History limited by: None    CHIEF COMPLAINT  Chief Complaint   Patient presents with   • Wound Re-Check   • Body Aches   • N/V       HPI  Kristin Balderrama is a 27 y.o. female who presents to the Emergency Department for a wound re-check after being seen four days ago with a left breast wound. She reports that she was started on IV antibiotics and discharged home with PO clindamycin 300 mg three times a day which she has been taking. She reports that she has been feeling better but this morning woke up with nausea, vomiting, body aches and chills. She reports that she checked the wound this morning and it was wet and white. The patient reports that she has a fever however on arrival here her temperature is 98.8. She denies any abdominal pain, numbness, tingling, diarrhea, urinary symptoms, nasal congestion, chest pain, shortness of breath.     REVIEW OF SYSTEMS  Pertinent positives include left breast wound (described as wet and white), nausea, vomiting, osorio aches, chills. Pertinent negatives include no abdominal pain, numbness, tingling, diarrhea, urinary symptoms, nasal congestion, chest pain, shortness of breath.  All other systems reviewed and negative.    PAST MEDICAL HISTORY  Past Medical History:   Diagnosis Date   • Abdominal pain    • Anginal syndrome     random chest pain especially with tachycardia   • Apnea, sleep    • Arrhythmia     \"sinus tachycardia\", cariologist, Dr. Kumar; ablation 2/2016   • Arthritis     osteo   • ASTHMA     when around smoke   • Atrial fibrillation (HCC)    • Back pain    • Borderline personality disorder    • Breath shortness     with tachycardia   • Cardiac arrhythmia    • Chickenpox    • Chronic UTI 9/18/2010   • Cough    • Daytime sleepiness    • Depression    • " "Diabetes (HCC)    • Diarrhea    • Disorder of thyroid    • Fall    • Fatigue    • Frequent headaches    • Gasping for breath    • Gynecological disorder     PCOS   • Headache(784.0)    • Heart burn    • History of falling    • Hypertension    • Migraine    • 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HCC)    • Multiple personality disorder    • Nausea    • Obesity    • Pain 08-15-12    back, 7/10   • Painful joint    • PCOS (polycystic ovarian syndrome)    • Pneumonia 2012   • Psychosis    • Renal disorder     \"kidney disease, stage 1\" nephrologist, Dr. 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   • Urinary bladder disorder     Suprapubic cath   • Urinary incontinence    • Weakness    • Wears glasses        SURGICAL HISTORY  Past Surgical History:   Procedure Laterality Date   • MUSCLE BIOPSY Right 1/26/2017    Procedure: MUSCLE BIOPSY - THIGH;  Surgeon: Isidro Vigil M.D.;  Location: [facility];  Service:    • GASTROSCOPY WITH BALLOON DILATATION N/A 1/4/2017    Procedure: GASTROSCOPY WITH DILATATION;  Surgeon: Torres Vargas M.D.;  Location: [facility];  Service:    • BOWEL STIMULATOR INSERTION  7/13/2016    Procedure: BOWEL STIMULATOR INSERTION FOR PERMANENT INTERSTIM SACRAL IMPLANT;  Surgeon: Joe Noyola M.D.;  Location: [facility];  Service:    • RECOVERY  1/27/2016    Procedure: CATH LAB EP STUDY WITH SINUS NODE MODIFICATION ABHINAV;  Surgeon: Recoveryonly Surgery;  Location: SURGERY PRE-POST PROC UNIT [facility];  Service:    • OTHER CARDIAC SURGERY  1/2016    cardiac ablation   • NEURO DEST FACET L/S W/IG SNGL  4/21/2015    Performed by Reza Tabor at [facility]   • LUMBAR FUSION ANTERIOR  8/21/2012    Performed by SUSIE SAWANT at [facility]   • " ALYSSA BY LAPAROSCOPY  8/29/2010    Performed by SHAYY JOHNS at SURGERY [facility]S   • LAMINOTOMY     • OTHER ABDOMINAL SURGERY     • TONSILLECTOMY      tonsillectomy        SOCIAL HISTORY  Social History   Substance Use Topics   • Smoking status: Never Smoker   • Smokeless tobacco: Never Used   • Alcohol use No      History   Drug Use   • Types: Marijuana, Inhaled     Comment: edibles, MJ       FAMILY HISTORY  Family History   Problem Relation Age of Onset   • Hypertension Mother    • Sleep Apnea Mother    • Heart Disease Mother    • Other Mother         hypothryod   • Hypertension Maternal Uncle    • Heart Disease Maternal Grandmother    • Hypertension Maternal Grandmother    • No Known Problems Sister    • Other Sister         Narcolepsy;fibromyalsia;bone;nerve   • Genitourinary () Sister         endometriosis       CURRENT MEDICATIONS  No current facility-administered medications on file prior to encounter.      Current Outpatient Prescriptions on File Prior to Encounter   Medication Sig Dispense Refill   • HYDROcodone-acetaminophen (NORCO) 5-325 MG Tab per tablet Take 1 Tab by mouth every 6 hours as needed for up to 4 days. 14 Tab 0   • baclofen (LIORESAL) 10 MG Tab Take 10 mg by mouth 2 times a day.     • gabapentin (NEURONTIN) 600 MG tablet Take 600-1,200 mg by mouth 2 times a day. 600 MG IN MORNING  1200 MG AT NIGHT      • liraglutide (VICTOZA) 18 MG/3ML Solution Pen-injector injection Inject 1.8 mg as instructed every day.     • ziprasidone (GEODON) 80 MG Cap Take 80 mg by mouth 2 Times a Day.     • cholestyramine (QUESTRAN) 4 g packet Take 1 Packet by mouth every day.     • prednisoLONE acetate (PRED FORTE) 1 % Suspension Place 1 Drop in right eye every 2 hours as needed (optic neurtis).     • Cholecalciferol (VITAMIN D3) 46065 units Cap Take 1 Each by mouth every 7 days. 4 Cap 5   • SITagliptin (JANUVIA) 100 MG Tab Take 1 Tab by mouth every day. 30 Tab 3   • promethazine (PHENERGAN) 25 MG  "Suppos Insert 1 Suppository in rectum every 6 hours as needed for Nausea/Vomiting. 10 Suppository 0   • traZODone (DESYREL) 100 MG Tab Take 1 Tab by mouth every bedtime. 90 Tab 3   • busPIRone (BUSPAR) 5 MG Tab Take 1 Tab by mouth 2 times a day. 60 Tab 5   • mupirocin (BACTROBAN) 2 % Ointment Apply BID to infected sores x 1week 30 g 0   • ivabradine (CORLANOR) 5 MG Tab tablet Take 1 Tab by mouth 2 times a day, with meals. 60 Tab 11   • sodium bicarbonate (SODIUM BICARBONATE) 650 MG Tab Take 650 mg by mouth 2 times a day.     • fluoxetine (PROZAC) 10 MG Cap TAKE ONE CAPSULE BY MOUTH ONCE A DAY 30 Cap 11   • albuterol 108 (90 Base) MCG/ACT Aero Soln inhalation aerosol Inhale 2 Puffs by mouth every 6 hours as needed for Shortness of Breath. 8.5 g 1   • aspirin EC (ECOTRIN) 81 MG Tablet Delayed Response Take 1 Tab by mouth every day. 30 Tab 6   • Melatonin 5 MG Tab Take 10 mg by mouth every bedtime (for sleep).       ALLERGIES  Allergies   Allergen Reactions   • Cefdinir Shortness of Breath and Itching     Tolerated 1/18/17   • Depakote [Divalproex Sodium] Unspecified     Muscle spasms/muscle pain and weakness     • Doxycycline Anaphylaxis and Vomiting     RXN=unknown   • Abilify Unspecified     Headaches/muscle twitching     • Amitriptyline Unspecified     Headaches     • Amoxicillin Rash     Pt states \"I get a rash\".     • Ciprofloxacin Rash     Pt states \"I get a rash\".     • Clindamycin Nausea     Even with food     • Ees [Erythromycin] Vomiting and Nausea   • Flagyl [Metronidazole Hcl] Unspecified     \"eye problems\"     • Flomax [Tamsulosin Hydrochloride] Swelling   • Metformin Unspecified     Increased lactic acid      • Sulfa Drugs Hives and Rash     RXN=since childhood   • Tape Rash     Tears skin off   coban with Tegaderm tape ok  RXN=ongoing   • Vancomycin Itching     Pt becomes flushed in face and chest.   RXN=7/10/16   • Wound Dressing Adhesive Hives     By pt report   • Cephalexin [Keflex] Rash     Pt " "states she gets a rash with this medication   • Erythromycin Rash     .   • Levofloxacin Unspecified     Leg muscle cramps   • Metronidazole Rash     .   • Valproic Acid Rash     .       PHYSICAL EXAM  VITAL SIGNS: /90   Pulse 69   Temp 37.2 °C (98.9 °F)   Resp 18   Ht 1.651 m (5' 5\")   Wt 122.5 kg (270 lb)   SpO2 96%   BMI 44.93 kg/m²     Nursing notes and vitals reviewed.  Constitutional: Well developed, Well nourished, No acute distress, Non-toxic appearance.   Eyes: PERRLA, EOMI, Conjunctiva normal, No discharge.   Cardiovascular: Normal heart rate, Normal rhythm, No murmurs, No rubs, No gallops.   Thorax & Lungs: No respiratory distress, No rales, No rhonchi, No wheezing, No chest tenderness.   Abdomen: Bowel sounds normal, Soft, No tenderness, No guarding, No rebound, No masses, No pulsatile masses.   Skin: Warm, left breast has evidence of a 4 cm diameter erythematous, not indurated, nonfluctuant lesion on the lateral breast at approximately 3:00, there is no alar discharge   Breast exam: The presence of a female nurse, the breast tissue has evidence of erythema, slight ulceration the lateral breast approximately 3:00 left breast, no aereolar discharge, the right breast is small excoriation marks but no evidences rounding erythema. Lesions are nonfluctuant, no discharge. Bedside ultrasound reveals no evidence of underlying fluid collection  Musculoskeletal: Intact distal pulses, No edema, No cyanosis, No clubbing. Good range of motion in all major joints. No tenderness to palpation or major deformities noted, no CVA tenderness, no midline back tenderness.   Neurologic: Alert & oriented x 3, Normal motor function, Normal sensory function, No focal deficits noted.  Psychiatric: Affect normal for clinical presentation.    DIAGNOSTIC STUDIES/PROCEDURES    LABS  Results for orders placed or performed during the hospital encounter of 08/02/18   CBC WITH DIFFERENTIAL   Result Value Ref Range    WBC 6.4 " 4.8 - 10.8 K/uL    RBC 4.48 4.20 - 5.40 M/uL    Hemoglobin 13.9 12.0 - 16.0 g/dL    Hematocrit 40.1 37.0 - 47.0 %    MCV 89.5 81.4 - 97.8 fL    MCH 31.0 27.0 - 33.0 pg    MCHC 34.7 33.6 - 35.0 g/dL    RDW 40.8 35.9 - 50.0 fL    Platelet Count 185 164 - 446 K/uL    MPV 11.1 9.0 - 12.9 fL    Neutrophils-Polys 62.40 44.00 - 72.00 %    Lymphocytes 28.40 22.00 - 41.00 %    Monocytes 6.40 0.00 - 13.40 %    Eosinophils 1.70 0.00 - 6.90 %    Basophils 0.60 0.00 - 1.80 %    Immature Granulocytes 0.50 0.00 - 0.90 %    Nucleated RBC 0.00 /100 WBC    Neutrophils (Absolute) 4.02 2.00 - 7.15 K/uL    Lymphs (Absolute) 1.83 1.00 - 4.80 K/uL    Monos (Absolute) 0.41 0.00 - 0.85 K/uL    Eos (Absolute) 0.11 0.00 - 0.51 K/uL    Baso (Absolute) 0.04 0.00 - 0.12 K/uL    Immature Granulocytes (abs) 0.03 0.00 - 0.11 K/uL    NRBC (Absolute) 0.00 K/uL   COMP METABOLIC PANEL   Result Value Ref Range    Sodium 139 135 - 145 mmol/L    Potassium 3.9 3.6 - 5.5 mmol/L    Chloride 106 96 - 112 mmol/L    Co2 23 20 - 33 mmol/L    Anion Gap 10.0 0.0 - 11.9    Glucose 109 (H) 65 - 99 mg/dL    Bun 8 8 - 22 mg/dL    Creatinine 0.80 0.50 - 1.40 mg/dL    Calcium 9.7 8.5 - 10.5 mg/dL    AST(SGOT) 39 12 - 45 U/L    ALT(SGPT) 74 (H) 2 - 50 U/L    Alkaline Phosphatase 56 30 - 99 U/L    Total Bilirubin 0.6 0.1 - 1.5 mg/dL    Albumin 4.6 3.2 - 4.9 g/dL    Total Protein 6.6 6.0 - 8.2 g/dL    Globulin 2.0 1.9 - 3.5 g/dL    A-G Ratio 2.3 g/dL   URINALYSIS   Result Value Ref Range    Color Yellow     Character Clear     Specific Gravity 1.023 <1.035    Ph 8.5 (A) 5.0 - 8.0    Glucose Negative Negative mg/dL    Ketones Negative Negative mg/dL    Protein Negative Negative mg/dL    Bilirubin Negative Negative    Urobilinogen, Urine 1.0 Negative    Nitrite Negative Negative    Leukocyte Esterase Small (A) Negative    Occult Blood Negative Negative    Micro Urine Req Microscopic    ESTIMATED GFR   Result Value Ref Range    GFR If African American >60 >60 mL/min/1.73 m 2     GFR If Non African American >60 >60 mL/min/1.73 m 2   URINE MICROSCOPIC (W/UA)   Result Value Ref Range    WBC 5-10 (A) /hpf    RBC 5-10 (A) /hpf    Bacteria Moderate (A) None /hpf    Epithelial Cells Many (A) /hpf    Hyaline Cast 0-2 /lpf     *Note: Due to a large number of results and/or encounters for the requested time period, some results have not been displayed. A complete set of results can be found in Results Review.     All labs reviewed by me.    COURSE & MEDICAL DECISION MAKING  Pertinent Labs & Imaging studies reviewed. (See chart for details)    12:01 PM - Patient seen and examined at bedside. Patient will be treated with Clindamycin 600 mg IV. Ordered CBC, CMP, UA to evaluate her symptoms. I spoke to the patient about her symptoms and that since she is failing outpatient antibiotics she will need to be admitted for inpatient IV antibiotics. She was agreeable with her plan of care at this time.     4:21 PM I discussed the patient's case and the above findings with Dr. Bowman (Hospitalist) who agreed to admit the patient.     5:10 PM Yamilka from Pharmacy called with antibiotic recommendations for the patient. She will get Vyvox and Rocephin.    This is a charming 27 y.o. female that presents with cellulitis of left breast as well as urinary tract infection. She is received clindamycin but has not been successful with sinus infection with that medication. For this reason, ceftriaxone as well as Vioxx has been prescribed. The patient has no evidence of abscess requiring emergent surgical debridement. She has no evidence of necrotizing fasciitis. She has a urinary tract infection the ceftriaxone cover this as well. Urine culture, blood cultures have been sent. I discussed the patient Dr. Rodriguez for hospitalization    DISPOSITION:  Patient will be admitted to Dr. Bowman in stable condition.      FINAL IMPRESSION  Breast cellulitis     I, Jericho Espino (Scribe), am scribing for, and in the presence of,  Jim Sandhu D.O    Electronically signed by: Jericho Espino (Scribe), 8/2/2018    I, Jim Sandhu D.O. personally performed the services described in this documentation, as scribed by Jericho Espino in my presence, and it is both accurate and complete.    The note accurately reflects work and decisions made by me.  Jim Sandhu  8/2/2018  6:50 PM

## 2018-08-03 ENCOUNTER — HOME CARE VISIT (OUTPATIENT)
Dept: HOME HEALTH SERVICES | Facility: HOME HEALTHCARE | Age: 28
End: 2018-08-03
Payer: MEDICARE

## 2018-08-03 VITALS
TEMPERATURE: 98.7 F | DIASTOLIC BLOOD PRESSURE: 68 MMHG | RESPIRATION RATE: 16 BRPM | SYSTOLIC BLOOD PRESSURE: 106 MMHG | HEART RATE: 78 BPM | OXYGEN SATURATION: 98 %

## 2018-08-03 PROBLEM — N61.0 CELLULITIS OF LEFT BREAST: Status: ACTIVE | Noted: 2018-08-03

## 2018-08-03 LAB
EKG IMPRESSION: NORMAL
GLUCOSE BLD-MCNC: 103 MG/DL (ref 65–99)
GLUCOSE BLD-MCNC: 106 MG/DL (ref 65–99)
GLUCOSE BLD-MCNC: 112 MG/DL (ref 65–99)
GLUCOSE BLD-MCNC: 88 MG/DL (ref 65–99)
GLUCOSE BLD-MCNC: 91 MG/DL (ref 65–99)
GRAM STN SPEC: NORMAL
SIGNIFICANT IND 70042: NORMAL
SITE SITE: NORMAL
SOURCE SOURCE: NORMAL
TROPONIN I SERPL-MCNC: <0.01 NG/ML (ref 0–0.04)
TROPONIN I SERPL-MCNC: <0.01 NG/ML (ref 0–0.04)

## 2018-08-03 PROCEDURE — 93010 ELECTROCARDIOGRAM REPORT: CPT | Performed by: INTERNAL MEDICINE

## 2018-08-03 PROCEDURE — 93005 ELECTROCARDIOGRAM TRACING: CPT | Performed by: FAMILY MEDICINE

## 2018-08-03 PROCEDURE — 87070 CULTURE OTHR SPECIMN AEROBIC: CPT

## 2018-08-03 PROCEDURE — 700111 HCHG RX REV CODE 636 W/ 250 OVERRIDE (IP): Performed by: FAMILY MEDICINE

## 2018-08-03 PROCEDURE — 700102 HCHG RX REV CODE 250 W/ 637 OVERRIDE(OP): Performed by: HOSPITALIST

## 2018-08-03 PROCEDURE — 770020 HCHG ROOM/CARE - TELE (206)

## 2018-08-03 PROCEDURE — 82962 GLUCOSE BLOOD TEST: CPT

## 2018-08-03 PROCEDURE — 700105 HCHG RX REV CODE 258: Performed by: HOSPITALIST

## 2018-08-03 PROCEDURE — 665999 HH PPS REVENUE DEBIT

## 2018-08-03 PROCEDURE — A9270 NON-COVERED ITEM OR SERVICE: HCPCS | Performed by: FAMILY MEDICINE

## 2018-08-03 PROCEDURE — 99232 SBSQ HOSP IP/OBS MODERATE 35: CPT | Performed by: FAMILY MEDICINE

## 2018-08-03 PROCEDURE — 700102 HCHG RX REV CODE 250 W/ 637 OVERRIDE(OP): Performed by: FAMILY MEDICINE

## 2018-08-03 PROCEDURE — 700111 HCHG RX REV CODE 636 W/ 250 OVERRIDE (IP): Performed by: HOSPITALIST

## 2018-08-03 PROCEDURE — 36415 COLL VENOUS BLD VENIPUNCTURE: CPT

## 2018-08-03 PROCEDURE — 665998 HH PPS REVENUE CREDIT

## 2018-08-03 PROCEDURE — 87205 SMEAR GRAM STAIN: CPT

## 2018-08-03 PROCEDURE — 700101 HCHG RX REV CODE 250: Performed by: HOSPITALIST

## 2018-08-03 PROCEDURE — A9270 NON-COVERED ITEM OR SERVICE: HCPCS | Performed by: HOSPITALIST

## 2018-08-03 PROCEDURE — 84484 ASSAY OF TROPONIN QUANT: CPT

## 2018-08-03 RX ORDER — ASPIRIN 325 MG
325 TABLET ORAL ONCE
Status: COMPLETED | OUTPATIENT
Start: 2018-08-03 | End: 2018-08-03

## 2018-08-03 RX ORDER — BUSPIRONE HYDROCHLORIDE 10 MG/1
10 TABLET ORAL 2 TIMES DAILY
Status: DISCONTINUED | OUTPATIENT
Start: 2018-08-03 | End: 2018-08-08 | Stop reason: HOSPADM

## 2018-08-03 RX ORDER — ONDANSETRON 2 MG/ML
4 INJECTION INTRAMUSCULAR; INTRAVENOUS EVERY 4 HOURS PRN
Status: DISCONTINUED | OUTPATIENT
Start: 2018-08-03 | End: 2018-08-08 | Stop reason: HOSPADM

## 2018-08-03 RX ORDER — CARVEDILOL 3.12 MG/1
3.12 TABLET ORAL 2 TIMES DAILY WITH MEALS
Status: DISCONTINUED | OUTPATIENT
Start: 2018-08-03 | End: 2018-08-05

## 2018-08-03 RX ADMIN — BUSPIRONE HYDROCHLORIDE 5 MG: 10 TABLET ORAL at 05:28

## 2018-08-03 RX ADMIN — GABAPENTIN 600 MG: 300 CAPSULE ORAL at 05:27

## 2018-08-03 RX ADMIN — GABAPENTIN 1200 MG: 400 CAPSULE ORAL at 18:08

## 2018-08-03 RX ADMIN — BACLOFEN 10 MG: 10 TABLET ORAL at 05:27

## 2018-08-03 RX ADMIN — BACLOFEN 10 MG: 10 TABLET ORAL at 18:09

## 2018-08-03 RX ADMIN — CEFTRIAXONE SODIUM 2 G: 2 INJECTION, POWDER, FOR SOLUTION INTRAMUSCULAR; INTRAVENOUS at 18:10

## 2018-08-03 RX ADMIN — SENNOSIDES AND DOCUSATE SODIUM 2 TABLET: 8.6; 5 TABLET ORAL at 05:27

## 2018-08-03 RX ADMIN — ASPIRIN 81 MG: 81 TABLET, COATED ORAL at 05:28

## 2018-08-03 RX ADMIN — MUPIROCIN 20 MG: 20 OINTMENT TOPICAL at 05:30

## 2018-08-03 RX ADMIN — SODIUM BICARBONATE 650 MG: 650 TABLET ORAL at 18:47

## 2018-08-03 RX ADMIN — ZIPRASIDONE HCL 80 MG: 40 CAPSULE ORAL at 18:47

## 2018-08-03 RX ADMIN — ZIPRASIDONE HCL 80 MG: 40 CAPSULE ORAL at 05:29

## 2018-08-03 RX ADMIN — CARVEDILOL 3.12 MG: 3.12 TABLET, FILM COATED ORAL at 18:09

## 2018-08-03 RX ADMIN — HYDROCODONE BITARTRATE AND ACETAMINOPHEN 1 TABLET: 5; 325 TABLET ORAL at 14:18

## 2018-08-03 RX ADMIN — SODIUM BICARBONATE 650 MG: 650 TABLET ORAL at 05:27

## 2018-08-03 RX ADMIN — HYDROCODONE BITARTRATE AND ACETAMINOPHEN 1 TABLET: 5; 325 TABLET ORAL at 20:42

## 2018-08-03 RX ADMIN — ONDANSETRON 4 MG: 2 INJECTION INTRAMUSCULAR; INTRAVENOUS at 18:30

## 2018-08-03 RX ADMIN — TRAZODONE HYDROCHLORIDE 100 MG: 50 TABLET ORAL at 20:42

## 2018-08-03 RX ADMIN — HYDROCODONE BITARTRATE AND ACETAMINOPHEN 1 TABLET: 5; 325 TABLET ORAL at 01:43

## 2018-08-03 RX ADMIN — FLUOXETINE 10 MG: 10 CAPSULE ORAL at 05:28

## 2018-08-03 RX ADMIN — HYDROCODONE BITARTRATE AND ACETAMINOPHEN 1 TABLET: 5; 325 TABLET ORAL at 08:14

## 2018-08-03 RX ADMIN — ASPIRIN 325 MG: 325 TABLET ORAL at 15:16

## 2018-08-03 RX ADMIN — BUSPIRONE HYDROCHLORIDE 10 MG: 10 TABLET ORAL at 18:31

## 2018-08-03 RX ADMIN — CHOLESTYRAMINE 4 G: 4 POWDER, FOR SUSPENSION ORAL at 05:32

## 2018-08-03 RX ADMIN — ONDANSETRON 4 MG: 2 INJECTION INTRAMUSCULAR; INTRAVENOUS at 11:30

## 2018-08-03 RX ADMIN — ACETAMINOPHEN 650 MG: 325 TABLET, FILM COATED ORAL at 10:21

## 2018-08-03 ASSESSMENT — ENCOUNTER SYMPTOMS
HEMOPTYSIS: 0
HEARTBURN: 0
DIARRHEA: 0
DIZZINESS: 0
COUGH: 0
TREMORS: 0
DOUBLE VISION: 0
FEVER: 0
SPEECH CHANGE: 0
HALLUCINATIONS: 0
HEADACHES: 0
FLANK PAIN: 0
WEAKNESS: 0
ORTHOPNEA: 0
SHORTNESS OF BREATH: 0
WHEEZING: 0
EYE DISCHARGE: 0
NECK PAIN: 0
SENSORY CHANGE: 0
ABDOMINAL PAIN: 0
NAUSEA: 1
SPUTUM PRODUCTION: 0
CHILLS: 0
BLURRED VISION: 0
NAUSEA: 0
EYE REDNESS: 0
CHILLS: 1
PALPITATIONS: 0
FOCAL WEAKNESS: 0
VOMITING: 0
TINGLING: 0
WEIGHT LOSS: 0
PHOTOPHOBIA: 0
BACK PAIN: 0
MYALGIAS: 1
DIAPHORESIS: 0
STRIDOR: 0
FEVER: 1
MYALGIAS: 0
VOMITING: 1

## 2018-08-03 ASSESSMENT — PATIENT HEALTH QUESTIONNAIRE - PHQ9
4. FEELING TIRED OR HAVING LITTLE ENERGY: SEVERAL DAYS
1. LITTLE INTEREST OR PLEASURE IN DOING THINGS: NOT AT ALL
9. THOUGHTS THAT YOU WOULD BE BETTER OFF DEAD, OR OF HURTING YOURSELF: NOT AT ALL
6. FEELING BAD ABOUT YOURSELF - OR THAT YOU ARE A FAILURE OR HAVE LET YOURSELF OR YOUR FAMILY DOWN: NOT AL ALL
7. TROUBLE CONCENTRATING ON THINGS, SUCH AS READING THE NEWSPAPER OR WATCHING TELEVISION: NOT AT ALL
3. TROUBLE FALLING OR STAYING ASLEEP OR SLEEPING TOO MUCH: SEVERAL DAYS
2. FEELING DOWN, DEPRESSED, IRRITABLE, OR HOPELESS: NEARLY EVERY DAY
5. POOR APPETITE OR OVEREATING: NOT AT ALL
SUM OF ALL RESPONSES TO PHQ9 QUESTIONS 1 AND 2: 3
8. MOVING OR SPEAKING SO SLOWLY THAT OTHER PEOPLE COULD HAVE NOTICED. OR THE OPPOSITE, BEING SO FIGETY OR RESTLESS THAT YOU HAVE BEEN MOVING AROUND A LOT MORE THAN USUAL: NOT AT ALL
SUM OF ALL RESPONSES TO PHQ QUESTIONS 1-9: 5

## 2018-08-03 ASSESSMENT — COGNITIVE AND FUNCTIONAL STATUS - GENERAL
STANDING UP FROM CHAIR USING ARMS: A LITTLE
SUGGESTED CMS G CODE MODIFIER MOBILITY: CK
SUGGESTED CMS G CODE MODIFIER MOBILITY: CH
DAILY ACTIVITIY SCORE: 24
SUGGESTED CMS G CODE MODIFIER DAILY ACTIVITY: CH
MOBILITY SCORE: 18
CLIMB 3 TO 5 STEPS WITH RAILING: TOTAL
MOBILITY SCORE: 24
WALKING IN HOSPITAL ROOM: A LOT

## 2018-08-03 ASSESSMENT — PAIN SCALES - GENERAL
PAINLEVEL_OUTOF10: 7
PAINLEVEL_OUTOF10: 9
PAINLEVEL_OUTOF10: 9
PAINLEVEL_OUTOF10: 8
PAINLEVEL_OUTOF10: 7
PAINLEVEL_OUTOF10: 8
PAINLEVEL_OUTOF10: 8
PAINLEVEL_OUTOF10: 0

## 2018-08-03 ASSESSMENT — LIFESTYLE VARIABLES
ALCOHOL_USE: NO
EVER_SMOKED: NEVER
SUBSTANCE_ABUSE: 0

## 2018-08-03 ASSESSMENT — COPD QUESTIONNAIRES
DO YOU EVER COUGH UP ANY MUCUS OR PHLEGM?: YES, A FEW DAYS A WEEK OR MONTH
HAVE YOU SMOKED AT LEAST 100 CIGARETTES IN YOUR ENTIRE LIFE: NO/DON'T KNOW
DURING THE PAST 4 WEEKS HOW MUCH DID YOU FEEL SHORT OF BREATH: MOST  OR ALL OF THE TIME
IN THE PAST 12 MONTHS DO YOU DO LESS THAN YOU USED TO BECAUSE OF YOUR BREATHING PROBLEMS: DISAGREE/UNSURE
COPD SCREENING SCORE: 3

## 2018-08-03 NOTE — PROGRESS NOTES
Patient transfer to tele 7. Report given to Selwyn HASSAN. All belongings with patient and accounted for.

## 2018-08-03 NOTE — DISCHARGE PLANNING
Anticipated Discharge Disposition: Home    Action: Patient was receiving H/H through [facility], will need order and face to face to resume    Barriers to Discharge: medical clearance and H/H acceptance    Plan: no current social work needs identified at this time

## 2018-08-03 NOTE — PROGRESS NOTES
[facility] Hospitalist Progress Note    Date of Service: 8/3/2018    Chief Complaint  27 y.o. female admitted 2018 with cellulitis of left breast    Interval Problem Update  none    Consultants/Specialty  none    Disposition  pending        Review of Systems   Constitutional: Negative for chills, fever and weight loss.   HENT: Negative for ear pain, hearing loss and tinnitus.    Eyes: Negative for blurred vision, double vision and photophobia.   Respiratory: Negative for cough, hemoptysis and sputum production.    Cardiovascular: Negative for chest pain, palpitations and orthopnea.   Gastrointestinal: Negative for heartburn, nausea and vomiting.   Genitourinary: Negative for dysuria, frequency and urgency.   Musculoskeletal: Negative for back pain, myalgias and neck pain.   Skin: Positive for rash (on the left breast).   Neurological: Negative for dizziness, tingling and headaches.      Physical Exam  Laboratory/Imaging   Hemodynamics  Temp (24hrs), Av.4 °C (97.6 °F), Min:36 °C (96.8 °F), Max:36.8 °C (98.3 °F)   Temperature: 36.4 °C (97.6 °F)  Pulse  Av.5  Min: 41  Max: 80    Blood Pressure: 140/73, NIBP: 150/71      Respiratory      Respiration: 18, Pulse Oximetry: 95 %        RUL Breath Sounds: Clear, RML Breath Sounds: Diminished, RLL Breath Sounds: Diminished, MARY Breath Sounds: Clear, LLL Breath Sounds: Diminished    Fluids    Intake/Output Summary (Last 24 hours) at 18 1138  Last data filed at 18 0400   Gross per 24 hour   Intake              960 ml   Output              120 ml   Net              840 ml       Nutrition  Orders Placed This Encounter   Procedures   • Diet Order Diabetic     Standing Status:   Standing     Number of Occurrences:   1     Order Specific Question:   Diet:     Answer:   Diabetic [3]     Order Specific Question:   Calorie modifications:     Answer:   1800 kcals [4]     Physical Exam   Constitutional: She is oriented to person, place, and time. She appears  well-developed and well-nourished.   HENT:   Head: Normocephalic and atraumatic.   Eyes: Pupils are equal, round, and reactive to light. Conjunctivae are normal.   Neck: Normal range of motion. Neck supple.   Cardiovascular: Normal rate and regular rhythm.    Pulmonary/Chest: Effort normal and breath sounds normal.   Abdominal: Soft. Bowel sounds are normal.   Musculoskeletal: She exhibits no edema or tenderness.   Neurological: She is alert and oriented to person, place, and time.   Skin: Skin is warm. There is erythema (minro erythem with skin abrasion on the lateral upper side of the left breast).       Recent Labs      08/02/18   1230   WBC  6.4   RBC  4.48   HEMOGLOBIN  13.9   HEMATOCRIT  40.1   MCV  89.5   MCH  31.0   MCHC  34.7   RDW  40.8   PLATELETCT  185   MPV  11.1     Recent Labs      08/02/18   1230   SODIUM  139   POTASSIUM  3.9   CHLORIDE  106   CO2  23   GLUCOSE  109*   BUN  8   CREATININE  0.80   CALCIUM  9.7                      Assessment/Plan     * Cellulitis of left breast   Assessment & Plan    Has improved  rocephin        Type 2 diabetes mellitus without complication, without long-term current use of insulin ([facility])- (present on admission)   Assessment & Plan    S.s.insulin  hema1c 6.8  Januvia        Chronic pain syndrome- (present on admission)   Assessment & Plan    Resume outpatient medications, baclofen and Neurontin        Morbid obesity with BMI of 45.0-49.9, adult ([facility])- (present on admission)   Assessment & Plan    We will need ongoing weight loss encouragement, counseling        Psychosis, schizophrenia, simple ([facility])- (present on admission)   Assessment & Plan    Without active psychosis resume outpatient medications          Quality-Core Measures   Andrade catheter::  No Andrade

## 2018-08-03 NOTE — ED NOTES
Medicated per MAR for pain along with PO abx.  IV abx infusing at this time.  Patient and family member updated on POC, continue to await admitting MD, apologized for wait time.

## 2018-08-03 NOTE — H&P
Hospital Medicine History & Physical Note    Date of Service  8/2/2018    Primary Care Physician  Torres Brody M.D.    Consultants  none    Code Status  full    Chief Complaint  Left breast swelling, warmth, pain and redness    History of Presenting Illness  27 y.o. Female history of morbid obesity, anxiety, chronic pain, CKD 1, schizophrenia, depression who presented 8/2/2018 with left breast swelling.  Onset of symptoms approximately 4 days ago.  She reports no injury or trauma.  Progressive increasing redness swelling warmth and pain there is some whitish drainage from the lesions.  Despite taking clindamycin over the past several days her symptoms have not improved.  Reports of nausea vomiting at times, fevers chills cold sweats.    Review of Systems  Review of Systems   Constitutional: Positive for chills, fever and malaise/fatigue. Negative for diaphoresis.   HENT: Negative for congestion.    Eyes: Negative for discharge and redness.   Respiratory: Negative for cough, shortness of breath, wheezing and stridor.    Cardiovascular: Negative for palpitations and leg swelling.   Gastrointestinal: Positive for nausea and vomiting. Negative for abdominal pain and diarrhea.   Genitourinary: Negative for flank pain and hematuria.   Musculoskeletal: Positive for myalgias. Negative for back pain, joint pain and neck pain.   Neurological: Negative for tremors, sensory change, speech change, focal weakness and weakness.   Psychiatric/Behavioral: Negative for hallucinations and substance abuse.       Past Medical History   has a past medical history of Abdominal pain; Anginal syndrome; Apnea, sleep; Arrhythmia; Arthritis; ASTHMA; Atrial fibrillation (HCC); Back pain; Borderline personality disorder; Breath shortness; Cardiac arrhythmia; Chickenpox; Chronic UTI (9/18/2010); Cough; Daytime sleepiness; Depression; Diabetes (HCC); Diarrhea; Disorder of thyroid; Fall; Fatigue; Frequent headaches; Gasping for breath;  "Gynecological disorder; Headache(784.0); Heart burn; History of falling; Hypertension; Migraine; 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HCC); Multiple personality disorder; Nausea; Obesity; Pain (08-15-12); Painful joint; PCOS (polycystic ovarian syndrome); Pneumonia (2012); Psychosis; Renal disorder; Ringing in ears; Scoliosis; Shortness of breath; Sinus tachycardia (10/31/2013); Sleep apnea; Snoring; Tonsillitis; Tuberculosis; Urinary bladder disorder; Urinary incontinence; Weakness; and Wears glasses.    Surgical History   has a past surgical history that includes neuro dest facet l/s w/ig sngl (4/21/2015); recovery (1/27/2016); delmar by laparoscopy (8/29/2010); lumbar fusion anterior (8/21/2012); other cardiac surgery (1/2016); tonsillectomy; bowel stimulator insertion (7/13/2016); gastroscopy with balloon dilatation (N/A, 1/4/2017); muscle biopsy (Right, 1/26/2017); other abdominal surgery; and laminotomy.     Family History  family history includes Genitourinary () in her sister; Heart Disease in her maternal grandmother and mother; Hypertension in her maternal grandmother, maternal uncle, and mother; No Known Problems in her sister; Other in her mother and sister; Sleep Apnea in her mother.     Social History   reports that she has never smoked. She has never used smokeless tobacco. She reports that she uses drugs, including Marijuana and Inhaled. She reports that she does not drink alcohol.    Allergies  Allergies   Allergen Reactions   • Cefdinir Shortness of Breath and Itching     Tolerated 1/18/17  Tolerates ceftriaxone    • Depakote [Divalproex Sodium] Unspecified     Muscle spasms/muscle pain and weakness     • Doxycycline Anaphylaxis and Vomiting     RXN=unknown   • Abilify Unspecified     Headaches/muscle twitching     • Amitriptyline Unspecified     Headaches     • Amoxicillin Rash     Pt states \"I get a rash\".     • Ciprofloxacin Rash     Pt states \"I get a rash\".     • Clindamycin Nausea     Even with food     • " "Ees [Erythromycin] Vomiting and Nausea   • Flagyl [Metronidazole Hcl] Unspecified     \"eye problems\"     • Flomax [Tamsulosin Hydrochloride] Swelling   • Metformin Unspecified     Increased lactic acid      • Sulfa Drugs Hives and Rash     RXN=since childhood   • Tape Rash     Tears skin off   coban with Tegaderm tape ok  RXN=ongoing   • Vancomycin Itching     Pt becomes flushed in face and chest.   RXN=7/10/16   • Wound Dressing Adhesive Hives     By pt report   • Cephalexin [Keflex] Rash     Pt states she gets a rash with this medication  Tolerates ceftriaxone   • Erythromycin Rash     .   • Levofloxacin Unspecified     Leg muscle cramps   • Metronidazole Rash     .   • Valproic Acid Rash     .       Medications  Prior to Admission Medications   Prescriptions Last Dose Informant Patient Reported? Taking?   Cholecalciferol (VITAMIN D3) 29566 units Cap 7/30/2018 at am Patient No No   Sig: Take 1 Each by mouth every 7 days.   HYDROcodone-acetaminophen (NORCO) 5-325 MG Tab per tablet 8/2/2018 at am  No No   Sig: Take 1 Tab by mouth every 6 hours as needed for up to 4 days.   Melatonin 5 MG Tab 8/1/2018 at pm Patient Yes No   Sig: Take 10 mg by mouth every bedtime (for sleep).   SITagliptin (JANUVIA) 100 MG Tab 8/2/2018 at am Patient No No   Sig: Take 1 Tab by mouth every day.   albuterol 108 (90 Base) MCG/ACT Aero Soln inhalation aerosol >week at unknown Patient No No   Sig: Inhale 2 Puffs by mouth every 6 hours as needed for Shortness of Breath.   aspirin EC (ECOTRIN) 81 MG Tablet Delayed Response 8/2/2018 at am Patient No No   Sig: Take 1 Tab by mouth every day.   baclofen (LIORESAL) 10 MG Tab 8/2/2018 at am Patient Yes No   Sig: Take 10 mg by mouth 2 times a day.   busPIRone (BUSPAR) 5 MG Tab 8/2/2018 at am Patient No No   Sig: Take 1 Tab by mouth 2 times a day.   cholestyramine (QUESTRAN) 4 g packet 8/2/2018 at am Patient Yes No   Sig: Take 1 Packet by mouth every day.   clindamycin (CLEOCIN) 300 MG Cap 8/2/2018 " at am  Yes Yes   Sig: Take 300 mg by mouth 3 times a day. Pt started a 10 day course on 7/30   fluoxetine (PROZAC) 10 MG Cap 8/2/2018 at am Patient No No   Sig: TAKE ONE CAPSULE BY MOUTH ONCE A DAY   gabapentin (NEURONTIN) 600 MG tablet 8/2/2018 at am Patient Yes No   Sig: Take 600-1,200 mg by mouth 2 times a day. 600 MG IN MORNING  1200 MG AT NIGHT    ivabradine (CORLANOR) 5 MG Tab tablet 8/2/2018 at am Patient No No   Sig: Take 1 Tab by mouth 2 times a day, with meals.   liraglutide (VICTOZA) 18 MG/3ML Solution Pen-injector injection 8/2/2018 at am Patient Yes No   Sig: Inject 1.8 mg as instructed every day.   mupirocin (BACTROBAN) 2 % Ointment 8/2/2018 at am Patient No No   Sig: Apply BID to infected sores x 1week   prednisoLONE acetate (PRED FORTE) 1 % Suspension 8/2/2018 at am  Yes No   Sig: Place 1 Drop in right eye every 2 hours as needed (optic neurtis).   promethazine (PHENERGAN) 25 MG Suppos 8/2/2018 at am Patient No No   Sig: Insert 1 Suppository in rectum every 6 hours as needed for Nausea/Vomiting.   sodium bicarbonate (SODIUM BICARBONATE) 650 MG Tab 8/2/2018 at am Patient Yes No   Sig: Take 650 mg by mouth 2 times a day.   traZODone (DESYREL) 100 MG Tab 8/1/2018 at pm Patient No No   Sig: Take 1 Tab by mouth every bedtime.   ziprasidone (GEODON) 80 MG Cap 8/1/2018 at pm Patient Yes No   Sig: Take 80 mg by mouth 2 Times a Day.      Facility-Administered Medications: None       Physical Exam  Blood Pressure: 131/78   Temperature: 36.8 °C (98.3 °F)   Pulse: 75   Respiration: 20   Pulse Oximetry: 91 %     Physical Exam   Constitutional: She is oriented to person, place, and time. No distress.   HENT:   Head: Normocephalic and atraumatic.   Right Ear: External ear normal.   Left Ear: External ear normal.   Nose: Nose normal.   Eyes: EOM are normal. Right eye exhibits no discharge. Left eye exhibits no discharge. No scleral icterus.   Neck: Neck supple. No JVD present.   Cardiovascular: Normal rate and  regular rhythm.    No murmur heard.  Pulmonary/Chest: Effort normal. No stridor. She has no wheezes. She has no rales.   Abdominal: Soft. Bowel sounds are normal. She exhibits no distension. There is no tenderness.   Musculoskeletal: She exhibits edema and tenderness.   Left breast with erythema swelling warmth and tenderness.  Shallow wounds with overlying scab at superior aspect, lateral.  No appreciable fluctuance   Neurological: She is alert and oriented to person, place, and time. No cranial nerve deficit.   Skin: Skin is warm and dry. She is not diaphoretic. No pallor.   Psychiatric: She has a normal mood and affect. Her behavior is normal.   Vitals reviewed.      Laboratory:  Recent Labs      08/02/18   1230   WBC  6.4   RBC  4.48   HEMOGLOBIN  13.9   HEMATOCRIT  40.1   MCV  89.5   MCH  31.0   MCHC  34.7   RDW  40.8   PLATELETCT  185   MPV  11.1     Recent Labs      08/02/18   1230   SODIUM  139   POTASSIUM  3.9   CHLORIDE  106   CO2  23   GLUCOSE  109*   BUN  8   CREATININE  0.80   CALCIUM  9.7     Recent Labs      08/02/18   1230   ALTSGPT  74*   ASTSGOT  39   ALKPHOSPHAT  56   TBILIRUBIN  0.6   GLUCOSE  109*                 Lab Results   Component Value Date    TROPONINI <0.01 03/27/2017       Urinalysis:    Recent Labs      08/02/18   1535   SPECGRAVITY  1.023   GLUCOSEUR  Negative   KETONES  Negative   NITRITE  Negative   LEUKESTERAS  Small*   WBCURINE  5-10*   RBCURINE  5-10*   BACTERIA  Moderate*   EPITHELCELL  Many*        Imaging:  No orders to display         Assessment/Plan:  I anticipate this patient will require at least two midnights for appropriate medical management, necessitating inpatient admission.    * Cellulitis of left breast   Assessment & Plan    Failed outpatient treatment, clindamycin.  She has had  bedside ultrasound with  no abscess.   She has multiple allergies .  Has tolerated IV Rocephin we will continue treatment.  Reports some drainage from her breasts, will check wound  culture        Type 2 diabetes mellitus without complication, without long-term current use of insulin ([facility])- (present on admission)   Assessment & Plan    Monitor serial Accu-Cheks, insulin sliding scale coverage as needed  Continue Januvia        Chronic pain syndrome- (present on admission)   Assessment & Plan    Resume outpatient medications, baclofen and Neurontin        Morbid obesity with BMI of 45.0-49.9, adult ([facility])- (present on admission)   Assessment & Plan    We will need ongoing weight loss encouragement, counseling        Psychosis, schizophrenia, simple ([facility])- (present on admission)   Assessment & Plan    Without active psychosis resume outpatient medications            VTE prophylaxis: SCDs

## 2018-08-03 NOTE — CARE PLAN
Problem: Communication  Goal: The ability to communicate needs accurately and effectively will improve    Intervention: [place] patient and significant other/support system to call light to alert staff of needs  Pt educated regarding plan of care and medications. All questions answered. Pt was oriented to unit routine, call light and bedside controls.       Problem: Pain Management  Goal: Pain level will decrease to patient's comfort goal  Outcome: PROGRESSING AS EXPECTED  Pt reports pain level of 9/10 located in the back, breast, knee and hip. Pt was medicated per MAR with PRN hydrocodone. Pain level has decreased and pt is able to rest comfortably.

## 2018-08-03 NOTE — PROGRESS NOTES
Patient complained of stabbing chest pain radiating to her neck, nausea, and clammy palms. Vitals obtained. Blood pressure elevated 152/81. Paged Dr. Bryson. Orders received for aspirin 325mg po once now, stat troponin, stat ekg. Lab and ekg tech notified of stat orders.

## 2018-08-03 NOTE — ASSESSMENT & PLAN NOTE
Clinically improved on ceftriaxone. Due to numerous drug allergies, I am not comfortable sending her home on a similar class oral agent without ID evaluation. Consulted ID for appropriate medication.  ID recommended midline placement and continuation of Ceftriaxone for a total of 12 days, stop date 8/12.  Please evaluate for discharge in a.m. Once outpatient plans is established by ID.

## 2018-08-03 NOTE — PROGRESS NOTES
Pt arrived to unit via ivan at 1930. Pt oriented to room, unit, and plan of care. All questions answered at this time. Call light within reach; fall precautions in place.

## 2018-08-03 NOTE — DISCHARGE PLANNING
ATTN: Case Management  RE: Referral for Home Health                We would like to take this opportunity to thank you for submitting a referral for your patient to continue care with [facility]. Our skilled team is dedicated to helping all patients recover and gain independence in the home setting.            As of 8/3/18, we have accepted the above patient into our service. A [facility] clinician will be out to see the patient within 48 hours to conduct our initial visit. If you have any questions or concerns regarding the patient’s transition to Home Health, please do not hesitate to contact us. We are open for referrals 7 days a week from 8AM to 5PM at [phone].      We look forward to collaborating with you,  [facility] Team

## 2018-08-03 NOTE — DIETARY
NUTRITION SERVICES: Pt with BMI >40 (44.76). Weight loss counseling not appropriate in acute care setting. Recommend referral to outpatient nutrition services for weight management after D/C, if pt desires.   Pt with wt loss per nutrition admit screen.  Wt in March of this year was 122.5 kg, admit wt 122 kg so no wt loss.  Pt is on an 1800 calorie diabetic diet, eating % of meals.  RD will monitor per dept policy.

## 2018-08-03 NOTE — DISCHARGE PLANNING
Anticipated Discharge Disposition: home with H/H    Action: patient has had [facility] for some time, unsure if they will take her back.  Faxed Choice to [facility]    Barriers to Discharge: medical clearance and H/H acceptance    Plan: no current social work needs identified at this time

## 2018-08-03 NOTE — PROGRESS NOTES
2 RN skin check completed at this time with SONYA Harris. Areas of concern include thick skin and calloused area on the bottom left and right foot bilaterally. Also the pt has several red circular scabs on the left breast.

## 2018-08-03 NOTE — FACE TO FACE
Face to Face Supporting Documentation - Home Health    The encounter with this patient was in whole or in part the primary reason for home health admission.    Date of encounter:   Patient:                    MRN:                       YOB: 2019  Kristin Balderrama  [id number]  1990     Home health to see patient for:  Skilled Nursing care for assessment, interventions & education    Skilled need for:  Exacerbation of Chronic Disease State health    Skilled nursing interventions to include:  Comment: none    Homebound status evidenced by:  Require the use of special transportation. Leaving home requires a considerable and taxing effort. There is a normal inability to leave the home.    Community Physician to provide follow up care: Torres Brody M.D.     Optional Interventions? No      I certify the face to face encounter for this home health care referral meets the CMS requirements and the encounter/clinical assessment with the patient was, in whole, or in part, for the medical condition(s) listed above, which is the primary reason for home health care. Based on my clinical findings: the service(s) are medically necessary, support the need for home health care, and the homebound criteria are met.  I certify that this patient has had a face to face encounter by myself.  Rolo Bryson M.D. - NPI: [id number]

## 2018-08-03 NOTE — PROGRESS NOTES
Pt complained of chest pain sharp and stabbing like in the left side of the chest with radiation to the neck.she has extensive cardiac history.as is given,1st trop is negative ekg does not show st changes.we will transfer to tele and repeat trop tonight.

## 2018-08-03 NOTE — CARE PLAN
Problem: Infection  Goal: Will remain free from infection  Outcome: PROGRESSING AS EXPECTED  Receiving IV antibiotics. Wound care consult in place. Wound culture collected.     Problem: Pain Management  Goal: Pain level will decrease to patient's comfort goal  Outcome: PROGRESSING AS EXPECTED  Prn pain medications in use. Patient states pain medication is effective.

## 2018-08-03 NOTE — DISCHARGE PLANNING
Received [facility] form at 4211  Agency/Facility Name: [facility]  Referral sent per [facility] form @ 0401

## 2018-08-04 LAB
ALBUMIN SERPL BCP-MCNC: 4.8 G/DL (ref 3.2–4.9)
ALBUMIN/GLOB SERPL: 2.3 G/DL
ALP SERPL-CCNC: 55 U/L (ref 30–99)
ALT SERPL-CCNC: 72 U/L (ref 2–50)
ANION GAP SERPL CALC-SCNC: 11 MMOL/L (ref 0–11.9)
AST SERPL-CCNC: 46 U/L (ref 12–45)
BACTERIA UR CULT: NORMAL
BASOPHILS # BLD AUTO: 0.6 % (ref 0–1.8)
BASOPHILS # BLD: 0.03 K/UL (ref 0–0.12)
BILIRUB SERPL-MCNC: 0.8 MG/DL (ref 0.1–1.5)
BUN SERPL-MCNC: 7 MG/DL (ref 8–22)
CALCIUM SERPL-MCNC: 9.4 MG/DL (ref 8.5–10.5)
CHLORIDE SERPL-SCNC: 107 MMOL/L (ref 96–112)
CO2 SERPL-SCNC: 22 MMOL/L (ref 20–33)
CREAT SERPL-MCNC: 0.74 MG/DL (ref 0.5–1.4)
EKG IMPRESSION: NORMAL
EOSINOPHIL # BLD AUTO: 0.18 K/UL (ref 0–0.51)
EOSINOPHIL NFR BLD: 3.3 % (ref 0–6.9)
ERYTHROCYTE [DISTWIDTH] IN BLOOD BY AUTOMATED COUNT: 41.4 FL (ref 35.9–50)
GLOBULIN SER CALC-MCNC: 2.1 G/DL (ref 1.9–3.5)
GLUCOSE BLD-MCNC: 100 MG/DL (ref 65–99)
GLUCOSE BLD-MCNC: 96 MG/DL (ref 65–99)
GLUCOSE BLD-MCNC: 99 MG/DL (ref 65–99)
GLUCOSE SERPL-MCNC: 101 MG/DL (ref 65–99)
HCT VFR BLD AUTO: 42.8 % (ref 37–47)
HGB BLD-MCNC: 14.6 G/DL (ref 12–16)
IMM GRANULOCYTES # BLD AUTO: 0.01 K/UL (ref 0–0.11)
IMM GRANULOCYTES NFR BLD AUTO: 0.2 % (ref 0–0.9)
LYMPHOCYTES # BLD AUTO: 2.28 K/UL (ref 1–4.8)
LYMPHOCYTES NFR BLD: 42.1 % (ref 22–41)
MCH RBC QN AUTO: 31.1 PG (ref 27–33)
MCHC RBC AUTO-ENTMCNC: 34.1 G/DL (ref 33.6–35)
MCV RBC AUTO: 91.1 FL (ref 81.4–97.8)
MONOCYTES # BLD AUTO: 0.34 K/UL (ref 0–0.85)
MONOCYTES NFR BLD AUTO: 6.3 % (ref 0–13.4)
NEUTROPHILS # BLD AUTO: 2.58 K/UL (ref 2–7.15)
NEUTROPHILS NFR BLD: 47.5 % (ref 44–72)
NRBC # BLD AUTO: 0 K/UL
NRBC BLD-RTO: 0 /100 WBC
PLATELET # BLD AUTO: 165 K/UL (ref 164–446)
PMV BLD AUTO: 11.1 FL (ref 9–12.9)
POTASSIUM SERPL-SCNC: 4.2 MMOL/L (ref 3.6–5.5)
PROT SERPL-MCNC: 6.9 G/DL (ref 6–8.2)
RBC # BLD AUTO: 4.7 M/UL (ref 4.2–5.4)
SIGNIFICANT IND 70042: NORMAL
SITE SITE: NORMAL
SODIUM SERPL-SCNC: 140 MMOL/L (ref 135–145)
SOURCE SOURCE: NORMAL
WBC # BLD AUTO: 5.4 K/UL (ref 4.8–10.8)

## 2018-08-04 PROCEDURE — 665998 HH PPS REVENUE CREDIT

## 2018-08-04 PROCEDURE — 82962 GLUCOSE BLOOD TEST: CPT | Mod: 91

## 2018-08-04 PROCEDURE — 85025 COMPLETE CBC W/AUTO DIFF WBC: CPT

## 2018-08-04 PROCEDURE — A9270 NON-COVERED ITEM OR SERVICE: HCPCS | Performed by: HOSPITALIST

## 2018-08-04 PROCEDURE — 93010 ELECTROCARDIOGRAM REPORT: CPT | Performed by: INTERNAL MEDICINE

## 2018-08-04 PROCEDURE — 80053 COMPREHEN METABOLIC PANEL: CPT

## 2018-08-04 PROCEDURE — 770006 HCHG ROOM/CARE - MED/SURG/GYN SEMI*

## 2018-08-04 PROCEDURE — 700111 HCHG RX REV CODE 636 W/ 250 OVERRIDE (IP): Performed by: FAMILY MEDICINE

## 2018-08-04 PROCEDURE — 36415 COLL VENOUS BLD VENIPUNCTURE: CPT

## 2018-08-04 PROCEDURE — 700111 HCHG RX REV CODE 636 W/ 250 OVERRIDE (IP): Performed by: HOSPITALIST

## 2018-08-04 PROCEDURE — 665999 HH PPS REVENUE DEBIT

## 2018-08-04 PROCEDURE — 700102 HCHG RX REV CODE 250 W/ 637 OVERRIDE(OP): Performed by: HOSPITALIST

## 2018-08-04 PROCEDURE — 93005 ELECTROCARDIOGRAM TRACING: CPT | Performed by: FAMILY MEDICINE

## 2018-08-04 PROCEDURE — A9270 NON-COVERED ITEM OR SERVICE: HCPCS | Performed by: FAMILY MEDICINE

## 2018-08-04 PROCEDURE — 700102 HCHG RX REV CODE 250 W/ 637 OVERRIDE(OP): Performed by: FAMILY MEDICINE

## 2018-08-04 PROCEDURE — 700105 HCHG RX REV CODE 258: Performed by: HOSPITALIST

## 2018-08-04 RX ADMIN — SODIUM BICARBONATE 650 MG: 650 TABLET ORAL at 05:36

## 2018-08-04 RX ADMIN — ZIPRASIDONE HCL 80 MG: 40 CAPSULE ORAL at 05:37

## 2018-08-04 RX ADMIN — HYDROCODONE BITARTRATE AND ACETAMINOPHEN 1 TABLET: 5; 325 TABLET ORAL at 04:24

## 2018-08-04 RX ADMIN — GABAPENTIN 600 MG: 300 CAPSULE ORAL at 05:35

## 2018-08-04 RX ADMIN — BACLOFEN 10 MG: 10 TABLET ORAL at 05:36

## 2018-08-04 RX ADMIN — TRAZODONE HYDROCHLORIDE 100 MG: 50 TABLET ORAL at 21:05

## 2018-08-04 RX ADMIN — CARVEDILOL 3.12 MG: 3.12 TABLET, FILM COATED ORAL at 08:13

## 2018-08-04 RX ADMIN — SENNOSIDES AND DOCUSATE SODIUM 2 TABLET: 8.6; 5 TABLET ORAL at 18:31

## 2018-08-04 RX ADMIN — BUSPIRONE HYDROCHLORIDE 10 MG: 10 TABLET ORAL at 05:36

## 2018-08-04 RX ADMIN — CARVEDILOL 3.12 MG: 3.12 TABLET, FILM COATED ORAL at 18:31

## 2018-08-04 RX ADMIN — MUPIROCIN 1 DOSE: 20 OINTMENT TOPICAL at 06:00

## 2018-08-04 RX ADMIN — CEFTRIAXONE SODIUM 2 G: 2 INJECTION, POWDER, FOR SOLUTION INTRAMUSCULAR; INTRAVENOUS at 18:42

## 2018-08-04 RX ADMIN — ASPIRIN 81 MG: 81 TABLET, COATED ORAL at 05:37

## 2018-08-04 RX ADMIN — ZIPRASIDONE HCL 80 MG: 40 CAPSULE ORAL at 18:31

## 2018-08-04 RX ADMIN — ACETAMINOPHEN 650 MG: 325 TABLET, FILM COATED ORAL at 18:41

## 2018-08-04 RX ADMIN — BUSPIRONE HYDROCHLORIDE 10 MG: 10 TABLET ORAL at 18:31

## 2018-08-04 RX ADMIN — ONDANSETRON 4 MG: 2 INJECTION INTRAMUSCULAR; INTRAVENOUS at 20:25

## 2018-08-04 RX ADMIN — FLUOXETINE 10 MG: 10 CAPSULE ORAL at 05:36

## 2018-08-04 RX ADMIN — SODIUM BICARBONATE 650 MG: 650 TABLET ORAL at 18:31

## 2018-08-04 RX ADMIN — ONDANSETRON 4 MG: 2 INJECTION INTRAMUSCULAR; INTRAVENOUS at 11:22

## 2018-08-04 RX ADMIN — BACLOFEN 10 MG: 10 TABLET ORAL at 18:32

## 2018-08-04 RX ADMIN — GABAPENTIN 1200 MG: 400 CAPSULE ORAL at 21:05

## 2018-08-04 RX ADMIN — SITAGLIPTIN 100 MG: 100 TABLET, FILM COATED ORAL at 05:36

## 2018-08-04 RX ADMIN — HYDROCODONE BITARTRATE AND ACETAMINOPHEN 1 TABLET: 5; 325 TABLET ORAL at 10:36

## 2018-08-04 ASSESSMENT — PATIENT HEALTH QUESTIONNAIRE - PHQ9
1. LITTLE INTEREST OR PLEASURE IN DOING THINGS: NOT AT ALL
3. TROUBLE FALLING OR STAYING ASLEEP OR SLEEPING TOO MUCH: SEVERAL DAYS
8. MOVING OR SPEAKING SO SLOWLY THAT OTHER PEOPLE COULD HAVE NOTICED. OR THE OPPOSITE, BEING SO FIGETY OR RESTLESS THAT YOU HAVE BEEN MOVING AROUND A LOT MORE THAN USUAL: NOT AT ALL
4. FEELING TIRED OR HAVING LITTLE ENERGY: SEVERAL DAYS
SUM OF ALL RESPONSES TO PHQ9 QUESTIONS 1 AND 2: 2
2. FEELING DOWN, DEPRESSED, IRRITABLE, OR HOPELESS: MORE THAN HALF THE DAYS
5. POOR APPETITE OR OVEREATING: NOT AT ALL
SUM OF ALL RESPONSES TO PHQ QUESTIONS 1-9: 4
6. FEELING BAD ABOUT YOURSELF - OR THAT YOU ARE A FAILURE OR HAVE LET YOURSELF OR YOUR FAMILY DOWN: NOT AL ALL
9. THOUGHTS THAT YOU WOULD BE BETTER OFF DEAD, OR OF HURTING YOURSELF: NOT AT ALL
7. TROUBLE CONCENTRATING ON THINGS, SUCH AS READING THE NEWSPAPER OR WATCHING TELEVISION: NOT AT ALL

## 2018-08-04 ASSESSMENT — ENCOUNTER SYMPTOMS
WEIGHT LOSS: 0
DIZZINESS: 0
CHILLS: 0
MYALGIAS: 0
TINGLING: 0
COUGH: 0
HEMOPTYSIS: 0
HEARTBURN: 0
NAUSEA: 0
PALPITATIONS: 0
PHOTOPHOBIA: 0
ORTHOPNEA: 0
FEVER: 0
DOUBLE VISION: 0
BLURRED VISION: 0
SPUTUM PRODUCTION: 0
VOMITING: 0
HEADACHES: 0
NECK PAIN: 0
BACK PAIN: 0

## 2018-08-04 ASSESSMENT — PAIN SCALES - GENERAL
PAINLEVEL_OUTOF10: 2
PAINLEVEL_OUTOF10: 3
PAINLEVEL_OUTOF10: 2
PAINLEVEL_OUTOF10: 8
PAINLEVEL_OUTOF10: 2
PAINLEVEL_OUTOF10: 6

## 2018-08-04 NOTE — PROGRESS NOTES
"Pt states, \"I'm freaking out right not\" shaking hands around head. ALEXANDRU Short called and reported to MD. Pt now appears calm, no distress.   "

## 2018-08-04 NOTE — PROGRESS NOTES
"IV removed per pt request. Pt reports, \"it is causing pain and anxiety\", also reports her grandmother is coming up to \"calm her down\".   "

## 2018-08-04 NOTE — PROGRESS NOTES
2 RN skin check  Blanching redness to bilateral ears, few scabs to breasts, L breast dressing in place, bilateral feet cracked and dry.

## 2018-08-04 NOTE — PROGRESS NOTES
[facility] Hospitalist Progress Note    Date of Service: 2018    Chief Complaint  27 y.o. female admitted 2018 with cellulitis of left breast    Interval Problem Update  Improved epr patient. She was transferred to telemetry due to complaint of chest pain. Troponin have been negative x2. She has numerous allergies complicating transition of ceftriaxone to an oral agent.     Consultants/Specialty  none    Disposition  pending        Review of Systems   Constitutional: Negative for chills, fever and weight loss.   HENT: Negative for ear pain, hearing loss and tinnitus.    Eyes: Negative for blurred vision, double vision and photophobia.   Respiratory: Negative for cough, hemoptysis and sputum production.    Cardiovascular: Negative for chest pain, palpitations and orthopnea.   Gastrointestinal: Negative for heartburn, nausea and vomiting.   Genitourinary: Negative for dysuria, frequency and urgency.   Musculoskeletal: Negative for back pain, myalgias and neck pain.   Skin: Positive for rash (on the left breast).   Neurological: Negative for dizziness, tingling and headaches.      Physical Exam  Laboratory/Imaging   Hemodynamics  Temp (24hrs), Av.4 °C (97.6 °F), Min:36.3 °C (97.3 °F), Max:36.9 °C (98.4 °F)   Temperature: 36.3 °C (97.3 °F)  Pulse  Av.3  Min: 41  Max: 87    Blood Pressure: 109/59      Respiratory      Respiration: 16, Pulse Oximetry: 96 %        RUL Breath Sounds: Clear, RML Breath Sounds: Clear, RLL Breath Sounds: Clear;Diminished, MARY Breath Sounds: Clear, LLL Breath Sounds: Clear;Diminished    Fluids    Intake/Output Summary (Last 24 hours) at 18 1639  Last data filed at 18 1300   Gross per 24 hour   Intake              330 ml   Output              450 ml   Net             -120 ml       Nutrition  Orders Placed This Encounter   Procedures   • Diet Order Diabetic     Standing Status:   Standing     Number of Occurrences:   1     Order Specific Question:   Diet:     Answer:    Diabetic [3]     Order Specific Question:   Calorie modifications:     Answer:   1800 kcals [4]     Physical Exam   Constitutional: She is oriented to person, place, and time. She appears well-developed and well-nourished.   HENT:   Head: Normocephalic and atraumatic.   Eyes: Pupils are equal, round, and reactive to light. Conjunctivae are normal.   Neck: Normal range of motion. Neck supple.   Cardiovascular: Normal rate and regular rhythm.    Pulmonary/Chest: Effort normal and breath sounds normal.   Abdominal: Soft. Bowel sounds are normal.   Musculoskeletal: She exhibits no edema or tenderness.   Neurological: She is alert and oriented to person, place, and time.   Skin: Skin is warm. No erythema (minro erythem with skin abrasion on the lateral upper side of the left breast).   No obvious erythema of any significance today.           Recent Labs      08/02/18   1230  08/04/18   0450   WBC  6.4  5.4   RBC  4.48  4.70   HEMOGLOBIN  13.9  14.6   HEMATOCRIT  40.1  42.8   MCV  89.5  91.1   MCH  31.0  31.1   MCHC  34.7  34.1   RDW  40.8  41.4   PLATELETCT  185  165   MPV  11.1  11.1     Recent Labs      08/02/18   1230  08/04/18   0450   SODIUM  139  140   POTASSIUM  3.9  4.2   CHLORIDE  106  107   CO2  23  22   GLUCOSE  109*  101*   BUN  8  7*   CREATININE  0.80  0.74   CALCIUM  9.7  9.4                      Assessment/Plan     * Cellulitis of left breast   Assessment & Plan    Clinically improved. Multiple listed drug allergies complicates transitioning to oral agent and potentially prolonging inpatient management. May need to talk to ID to assist with appropriate oral agent.   Improved on rocephin        Type 2 diabetes mellitus without complication, without long-term current use of insulin (HCC)- (present on admission)   Assessment & Plan    Continue SSI  hema1c 6.8  Januvia        Chronic pain syndrome- (present on admission)   Assessment & Plan    Resume outpatient medications, baclofen and Neurontin         Morbid obesity with BMI of 45.0-49.9, adult ([facility])- (present on admission)   Assessment & Plan    We will need ongoing weight loss encouragement, counseling        Psychosis, schizophrenia, simple (HCC)- (present on admission)   Assessment & Plan    Without active psychosis resume outpatient medications          Quality-Core Measures   Andrade catheter::  No Andrade

## 2018-08-04 NOTE — PROGRESS NOTES
Received Bedside report. Assumed care at 1900. This pt is AOx4, x1 pivot to the commode, has pain generalized. Patient and RN discussed plan of care: questions answered. Labs noted, assessment complete, patient tolerating diabetic diet. Tele box in place. Pt is on 3L of O2 via NC. Call light in place, fall precautions in place, patient educated on importance of calling for assistance. No additional needs at this time. VSS

## 2018-08-04 NOTE — CARE PLAN
Problem: Knowledge Deficit  Goal: Knowledge of disease process/condition, treatment plan, diagnostic tests, and medications will improve  Outcome: PROGRESSING AS EXPECTED  Patient is educated of disease process and condition. Treatment team has included patient in plan of care. All medications indications and side effects are explained. Patient is encouraged to ask questions. Patient indicates understanding.    Problem: Pain Management  Goal: Pain level will decrease to patient's comfort goal  Outcome: PROGRESSING SLOWER THAN EXPECTED  Patient educated to pain scale system. Patient encouraged to verbalize discomfort. Patient taught about non-pharmacological pain management.

## 2018-08-04 NOTE — PROGRESS NOTES
Report received from Carlos Alberto at bedside in S6. Pt is chest pain free at this time, in SR. Placed on zoll monitor, transported to [place] [id number]. Oriented to floor, 2 RN skin check completed. Call light within reach.

## 2018-08-05 LAB
BACTERIA WND AEROBE CULT: NORMAL
GLUCOSE BLD-MCNC: 102 MG/DL (ref 65–99)
GLUCOSE BLD-MCNC: 111 MG/DL (ref 65–99)
GLUCOSE BLD-MCNC: 115 MG/DL (ref 65–99)
GLUCOSE BLD-MCNC: 130 MG/DL (ref 65–99)
GRAM STN SPEC: NORMAL
SIGNIFICANT IND 70042: NORMAL
SITE SITE: NORMAL
SOURCE SOURCE: NORMAL

## 2018-08-05 PROCEDURE — 770006 HCHG ROOM/CARE - MED/SURG/GYN SEMI*

## 2018-08-05 PROCEDURE — 99233 SBSQ HOSP IP/OBS HIGH 50: CPT | Performed by: HOSPITALIST

## 2018-08-05 PROCEDURE — 700105 HCHG RX REV CODE 258: Performed by: HOSPITALIST

## 2018-08-05 PROCEDURE — 665999 HH PPS REVENUE DEBIT

## 2018-08-05 PROCEDURE — 700102 HCHG RX REV CODE 250 W/ 637 OVERRIDE(OP): Performed by: FAMILY MEDICINE

## 2018-08-05 PROCEDURE — 302255 BARRIER CREAM MOISTURE BAZA PROTECT (ZINC) 5OZ: Performed by: HOSPITALIST

## 2018-08-05 PROCEDURE — 700111 HCHG RX REV CODE 636 W/ 250 OVERRIDE (IP): Performed by: FAMILY MEDICINE

## 2018-08-05 PROCEDURE — 700111 HCHG RX REV CODE 636 W/ 250 OVERRIDE (IP): Performed by: HOSPITALIST

## 2018-08-05 PROCEDURE — A9270 NON-COVERED ITEM OR SERVICE: HCPCS | Performed by: HOSPITALIST

## 2018-08-05 PROCEDURE — A9270 NON-COVERED ITEM OR SERVICE: HCPCS | Performed by: FAMILY MEDICINE

## 2018-08-05 PROCEDURE — 665998 HH PPS REVENUE CREDIT

## 2018-08-05 PROCEDURE — 700102 HCHG RX REV CODE 250 W/ 637 OVERRIDE(OP): Performed by: HOSPITALIST

## 2018-08-05 PROCEDURE — 82962 GLUCOSE BLOOD TEST: CPT | Mod: 91

## 2018-08-05 RX ADMIN — CARVEDILOL 3.12 MG: 3.12 TABLET, FILM COATED ORAL at 07:55

## 2018-08-05 RX ADMIN — BUSPIRONE HYDROCHLORIDE 10 MG: 10 TABLET ORAL at 17:40

## 2018-08-05 RX ADMIN — ASPIRIN 81 MG: 81 TABLET, COATED ORAL at 05:23

## 2018-08-05 RX ADMIN — MUPIROCIN 2 %: 20 OINTMENT TOPICAL at 05:24

## 2018-08-05 RX ADMIN — SITAGLIPTIN 100 MG: 100 TABLET, FILM COATED ORAL at 05:23

## 2018-08-05 RX ADMIN — ZIPRASIDONE HCL 80 MG: 40 CAPSULE ORAL at 05:23

## 2018-08-05 RX ADMIN — SODIUM BICARBONATE 650 MG: 650 TABLET ORAL at 05:22

## 2018-08-05 RX ADMIN — FLUOXETINE 10 MG: 10 CAPSULE ORAL at 05:23

## 2018-08-05 RX ADMIN — ACETAMINOPHEN 650 MG: 325 TABLET, FILM COATED ORAL at 16:17

## 2018-08-05 RX ADMIN — HYDROCODONE BITARTRATE AND ACETAMINOPHEN 1 TABLET: 5; 325 TABLET ORAL at 10:57

## 2018-08-05 RX ADMIN — BACLOFEN 10 MG: 10 TABLET ORAL at 05:21

## 2018-08-05 RX ADMIN — TRAZODONE HYDROCHLORIDE 100 MG: 50 TABLET ORAL at 20:14

## 2018-08-05 RX ADMIN — GABAPENTIN 1200 MG: 400 CAPSULE ORAL at 17:36

## 2018-08-05 RX ADMIN — HYDROCODONE BITARTRATE AND ACETAMINOPHEN 1 TABLET: 5; 325 TABLET ORAL at 04:18

## 2018-08-05 RX ADMIN — HYDROCODONE BITARTRATE AND ACETAMINOPHEN 1 TABLET: 5; 325 TABLET ORAL at 20:22

## 2018-08-05 RX ADMIN — GABAPENTIN 600 MG: 300 CAPSULE ORAL at 05:22

## 2018-08-05 RX ADMIN — ONDANSETRON 4 MG: 2 INJECTION INTRAMUSCULAR; INTRAVENOUS at 18:24

## 2018-08-05 RX ADMIN — CEFTRIAXONE SODIUM 2 G: 2 INJECTION, POWDER, FOR SOLUTION INTRAMUSCULAR; INTRAVENOUS at 17:35

## 2018-08-05 RX ADMIN — ZIPRASIDONE HCL 80 MG: 40 CAPSULE ORAL at 17:40

## 2018-08-05 RX ADMIN — BUSPIRONE HYDROCHLORIDE 10 MG: 10 TABLET ORAL at 05:21

## 2018-08-05 RX ADMIN — SODIUM BICARBONATE 650 MG: 650 TABLET ORAL at 17:40

## 2018-08-05 RX ADMIN — MUPIROCIN 1 UNITS: 20 OINTMENT TOPICAL at 17:40

## 2018-08-05 RX ADMIN — BACLOFEN 10 MG: 10 TABLET ORAL at 17:36

## 2018-08-05 SDOH — ECONOMIC STABILITY: HOUSING INSECURITY: UNSAFE APPLIANCES: 0

## 2018-08-05 SDOH — ECONOMIC STABILITY: HOUSING INSECURITY: UNSAFE COOKING RANGE AREA: 0

## 2018-08-05 ASSESSMENT — ENCOUNTER SYMPTOMS
CHILLS: 0
HEARTBURN: 0
HEMOPTYSIS: 0
MYALGIAS: 0
NECK PAIN: 0
DIZZINESS: 0
COUGH: 0
RESPIRATORY SYMPTOMS COMMENTS: DENIES SHORTNESS OF BREATH
BACK PAIN: 0
TINGLING: 0
PHOTOPHOBIA: 0
WEIGHT LOSS: 0
DOUBLE VISION: 0
NAUSEA: 0
VOMITING: 0
FEVER: 0
PALPITATIONS: 0
ORTHOPNEA: 0
BLURRED VISION: 0
SPUTUM PRODUCTION: 0
HEADACHES: 0

## 2018-08-05 ASSESSMENT — PATIENT HEALTH QUESTIONNAIRE - PHQ9
2. FEELING DOWN, DEPRESSED, IRRITABLE, OR HOPELESS: MORE THAN HALF THE DAYS
1. LITTLE INTEREST OR PLEASURE IN DOING THINGS: NOT AT ALL
SUM OF ALL RESPONSES TO PHQ9 QUESTIONS 1 AND 2: 2

## 2018-08-05 ASSESSMENT — PAIN SCALES - GENERAL
PAINLEVEL_OUTOF10: 3
PAINLEVEL_OUTOF10: 4
PAINLEVEL_OUTOF10: 8
PAINLEVEL_OUTOF10: 3
PAINLEVEL_OUTOF10: 10
PAINLEVEL_OUTOF10: 3
PAINLEVEL_OUTOF10: 2

## 2018-08-05 NOTE — DISCHARGE INSTRUCTIONS
Discharge Instructions    Discharged to home by car with relative. Discharged via wheelchair, hospital escort: Yes.  Special equipment needed: Not Applicable    Be sure to schedule a follow-up appointment with your primary care doctor or any specialists as instructed.     Discharge Plan:   Influenza Vaccine Indication: Patient Refuses, Not indicated: Previously immunized this influenza season and > 8 years of age    I understand that a diet low in cholesterol, fat, and sodium is recommended for good health. Unless I have been given specific instructions below for another diet, I accept this instruction as my diet prescription.   Other diet: as directed    Special Instructions: None    · Is patient discharged on Warfarin / Coumadin?   No     Depression / Suicide Risk    As you are discharged from this [facility] facility, it is important to learn how to keep safe from harming yourself.    Recognize the warning signs:  · Abrupt changes in personality, positive or negative- including increase in energy   · Giving away possessions  · Change in eating patterns- significant weight changes-  positive or negative  · Change in sleeping patterns- unable to sleep or sleeping all the time   · Unwillingness or inability to communicate  · Depression  · Unusual sadness, discouragement and loneliness  · Talk of wanting to die  · Neglect of personal appearance   · Rebelliousness- reckless behavior  · Withdrawal from people/activities they love  · Confusion- inability to concentrate     If you or a loved one observes any of these behaviors or has concerns about self-harm, here's what you can do:  · Talk about it- your feelings and reasons for harming yourself  · Remove any means that you might use to hurt yourself (examples: pills, rope, extension cords, firearm)  · Get professional help from the community (Mental Health, Substance Abuse, psychological counseling)  · Do not be alone:Call your Safe Contact- someone whom you trust  who will be there for you.  · Call your local CRISIS HOTLINE 630-7048 or 816-767-1864  · Call your local Children's Mobile Crisis Response Team [place] (615) 452-3300 or www.[employer]  · Call the toll free National Suicide Prevention Hotlines   · National Suicide Prevention Lifeline 759-623-BEMQ (6734)  · National Hope Line Network 800-SUICIDE (837-9580)      Cellulitis, Adult  Cellulitis is a skin infection. The infected area is usually red and tender. This condition occurs most often in the arms and lower legs. The infection can travel to the muscles, blood, and underlying tissue and become serious. It is very important to get treated for this condition.  What are the causes?  Cellulitis is caused by bacteria. The bacteria enter through a break in the skin, such as a cut, burn, insect bite, open sore, or crack.  What increases the risk?  This condition is more likely to occur in people who:  · Have a weak defense system (immune system).  · Have open wounds on the skin such as cuts, burns, bites, and scrapes. Bacteria can enter the body through these open wounds.  · Are older.  · Have diabetes.  · Have a type of long-lasting (chronic) liver disease (cirrhosis) or kidney disease.  · Use IV drugs.  What are the signs or symptoms?  Symptoms of this condition include:  · Redness, streaking, or spotting on the skin.  · Swollen area of the skin.  · Tenderness or pain when an area of the skin is touched.  · Warm skin.  · Fever.  · Chills.  · Blisters.  How is this diagnosed?  This condition is diagnosed based on a medical history and physical exam. You may also have tests, including:  · Blood tests.  · Lab tests.  · Imaging tests.  How is this treated?  Treatment for this condition may include:  · Medicines, such as antibiotic medicines or antihistamines.  · Supportive care, such as rest and application of cold or warm cloths (cold or warm compresses) to the skin.  · Hospital care, if the condition is  severe.  The infection usually gets better within 1-2 days of treatment.  Follow these instructions at home:  · Take over-the-counter and prescription medicines only as told by your health care provider.  · If you were prescribed an antibiotic medicine, take it as told by your health care provider. Do not stop taking the antibiotic even if you start to feel better.  · Drink enough fluid to keep your urine clear or pale yellow.  · Do not touch or rub the infected area.  · Raise (elevate) the infected area above the level of your heart while you are sitting or lying down.  · Apply warm or cold compresses to the affected area as told by your health care provider.  · Keep all follow-up visits as told by your health care provider. This is important. These visits let your health care provider make sure a more serious infection is not developing.  Contact a health care provider if:  · You have a fever.  · Your symptoms do not improve within 1-2 days of starting treatment.  · Your bone or joint underneath the infected area becomes painful after the skin has healed.  · Your infection returns in the same area or another area.  · You notice a swollen bump in the infected area.  · You develop new symptoms.  · You have a general ill feeling (malaise) with muscle aches and pains.  Get help right away if:  · Your symptoms get worse.  · You feel very sleepy.  · You develop vomiting or diarrhea that persists.  · You notice red streaks coming from the infected area.  · Your red area gets larger or turns dark in color.  This information is not intended to replace advice given to you by your health care provider. Make sure you discuss any questions you have with your health care provider.  Document Released: 09/27/2006 Document Revised: 04/27/2017 Document Reviewed: 10/26/2016  [employer] Patient Education © 2017 [employer]

## 2018-08-06 ENCOUNTER — APPOINTMENT (OUTPATIENT)
Dept: RADIOLOGY | Facility: MEDICAL CENTER | Age: 28
DRG: 600 | End: 2018-08-06
Attending: INTERNAL MEDICINE
Payer: MEDICARE

## 2018-08-06 LAB — GLUCOSE BLD-MCNC: 107 MG/DL (ref 65–99)

## 2018-08-06 PROCEDURE — A9270 NON-COVERED ITEM OR SERVICE: HCPCS | Performed by: HOSPITALIST

## 2018-08-06 PROCEDURE — 700102 HCHG RX REV CODE 250 W/ 637 OVERRIDE(OP): Performed by: FAMILY MEDICINE

## 2018-08-06 PROCEDURE — 82962 GLUCOSE BLOOD TEST: CPT | Mod: 91

## 2018-08-06 PROCEDURE — 700111 HCHG RX REV CODE 636 W/ 250 OVERRIDE (IP): Performed by: FAMILY MEDICINE

## 2018-08-06 PROCEDURE — A9270 NON-COVERED ITEM OR SERVICE: HCPCS | Performed by: FAMILY MEDICINE

## 2018-08-06 PROCEDURE — 99232 SBSQ HOSP IP/OBS MODERATE 35: CPT | Performed by: HOSPITALIST

## 2018-08-06 PROCEDURE — 665999 HH PPS REVENUE DEBIT

## 2018-08-06 PROCEDURE — 665998 HH PPS REVENUE CREDIT

## 2018-08-06 PROCEDURE — 700111 HCHG RX REV CODE 636 W/ 250 OVERRIDE (IP): Performed by: HOSPITALIST

## 2018-08-06 PROCEDURE — 700102 HCHG RX REV CODE 250 W/ 637 OVERRIDE(OP): Performed by: HOSPITALIST

## 2018-08-06 PROCEDURE — 700105 HCHG RX REV CODE 258: Performed by: HOSPITALIST

## 2018-08-06 PROCEDURE — 99223 1ST HOSP IP/OBS HIGH 75: CPT | Performed by: INTERNAL MEDICINE

## 2018-08-06 PROCEDURE — 770006 HCHG ROOM/CARE - MED/SURG/GYN SEMI*

## 2018-08-06 PROCEDURE — B54NZZA ULTRASONOGRAPHY OF LEFT UPPER EXTREMITY VEINS, GUIDANCE: ICD-10-PCS | Performed by: INTERNAL MEDICINE

## 2018-08-06 PROCEDURE — 36569 INSJ PICC 5 YR+ W/O IMAGING: CPT

## 2018-08-06 PROCEDURE — 05HC33Z INSERTION OF INFUSION DEVICE INTO LEFT BASILIC VEIN, PERCUTANEOUS APPROACH: ICD-10-PCS | Performed by: INTERNAL MEDICINE

## 2018-08-06 RX ORDER — 0.9 % SODIUM CHLORIDE 0.9 %
10-20 VIAL (ML) INJECTION PRN
Status: CANCELLED | OUTPATIENT
Start: 2018-08-06

## 2018-08-06 RX ORDER — 0.9 % SODIUM CHLORIDE 0.9 %
5 VIAL (ML) INJECTION PRN
Status: CANCELLED | OUTPATIENT
Start: 2018-08-06

## 2018-08-06 RX ADMIN — BUSPIRONE HYDROCHLORIDE 10 MG: 10 TABLET ORAL at 05:10

## 2018-08-06 RX ADMIN — ZIPRASIDONE HCL 80 MG: 40 CAPSULE ORAL at 05:11

## 2018-08-06 RX ADMIN — BACLOFEN 10 MG: 10 TABLET ORAL at 18:51

## 2018-08-06 RX ADMIN — SODIUM BICARBONATE 650 MG: 650 TABLET ORAL at 18:33

## 2018-08-06 RX ADMIN — GABAPENTIN 600 MG: 300 CAPSULE ORAL at 05:09

## 2018-08-06 RX ADMIN — TRAZODONE HYDROCHLORIDE 100 MG: 50 TABLET ORAL at 20:04

## 2018-08-06 RX ADMIN — MUPIROCIN 1 APPLICATION: 20 OINTMENT TOPICAL at 05:13

## 2018-08-06 RX ADMIN — SODIUM BICARBONATE 650 MG: 650 TABLET ORAL at 05:10

## 2018-08-06 RX ADMIN — CEFTRIAXONE SODIUM 2 G: 2 INJECTION, POWDER, FOR SOLUTION INTRAMUSCULAR; INTRAVENOUS at 18:33

## 2018-08-06 RX ADMIN — ONDANSETRON 4 MG: 2 INJECTION INTRAMUSCULAR; INTRAVENOUS at 18:33

## 2018-08-06 RX ADMIN — HYDROCODONE BITARTRATE AND ACETAMINOPHEN 1 TABLET: 5; 325 TABLET ORAL at 02:20

## 2018-08-06 RX ADMIN — SITAGLIPTIN 100 MG: 100 TABLET, FILM COATED ORAL at 05:09

## 2018-08-06 RX ADMIN — ONDANSETRON 4 MG: 2 INJECTION INTRAMUSCULAR; INTRAVENOUS at 13:46

## 2018-08-06 RX ADMIN — BACLOFEN 10 MG: 10 TABLET ORAL at 05:09

## 2018-08-06 RX ADMIN — ZIPRASIDONE HCL 80 MG: 40 CAPSULE ORAL at 18:33

## 2018-08-06 RX ADMIN — GABAPENTIN 1200 MG: 400 CAPSULE ORAL at 18:33

## 2018-08-06 RX ADMIN — ASPIRIN 81 MG: 81 TABLET, COATED ORAL at 05:11

## 2018-08-06 RX ADMIN — FLUOXETINE 10 MG: 10 CAPSULE ORAL at 05:10

## 2018-08-06 RX ADMIN — ACETAMINOPHEN 650 MG: 325 TABLET, FILM COATED ORAL at 16:15

## 2018-08-06 RX ADMIN — ACETAMINOPHEN 650 MG: 325 TABLET, FILM COATED ORAL at 02:20

## 2018-08-06 RX ADMIN — ONDANSETRON 4 MG: 2 INJECTION INTRAMUSCULAR; INTRAVENOUS at 08:33

## 2018-08-06 RX ADMIN — BUSPIRONE HYDROCHLORIDE 10 MG: 10 TABLET ORAL at 19:59

## 2018-08-06 ASSESSMENT — PAIN SCALES - GENERAL
PAINLEVEL_OUTOF10: 7
PAINLEVEL_OUTOF10: 3
PAINLEVEL_OUTOF10: 3
PAINLEVEL_OUTOF10: 4
PAINLEVEL_OUTOF10: 7
PAINLEVEL_OUTOF10: 3
PAINLEVEL_OUTOF10: 10

## 2018-08-06 ASSESSMENT — ENCOUNTER SYMPTOMS
HEMOPTYSIS: 0
NAUSEA: 0
COUGH: 0
TINGLING: 0
BLURRED VISION: 0
MYALGIAS: 0
HEADACHES: 0
DIZZINESS: 0
SPUTUM PRODUCTION: 0
BACK PAIN: 0
DOUBLE VISION: 0
PHOTOPHOBIA: 0
HEARTBURN: 0
WEIGHT LOSS: 0
FEVER: 0
PALPITATIONS: 0
ORTHOPNEA: 0
NECK PAIN: 0
CHILLS: 0
VOMITING: 0

## 2018-08-06 NOTE — CONSULTS
"INFECTIOUS DISEASES INPATIENT CONSULT NOTE     Date of Service: 8/6/2018    Consult Requested By: Jesenia Perkins M.D.    Reason for Consultation: Left breast cellulitis    History of Present Illness:   Kristin Balderrama is a 27 y.o. morbidly obese woman with a history of anxiety, depression, borderline personality disorder, and schizophrenia admitted 8/2/2018 for worsening left breast erythema, pain and drainage.  Patient states she has found multiple black  spiders in her home approximately 1 month ago.  She sustained a spider bite to her left breast approximately 3 weeks ago.  She developed increasing redness, pain and swelling.  At that time she presented to the ED for further evaluation and she was discharged on oral clindamycin.  Despite several days of p.o. antibiotics patient states her erythema and drainage were worsening.  She noted purulent drainage.  No associated fevers, chills, nausea, or vomiting.  Giving worsening signs of infection she presented to the ED for further evaluation and management.  On presentation, she was afebrile without any leukocytosis.  No imaging was performed.  Given multiple antibiotic allergies she was started on IV ceftriaxone.  Blood cultures from admission are negative.  Wound cultures on her left breast are also negative to date.  Overall patient states her erythema, swelling and drainage have improved significantly while on IV antibiotics.  She is not having any diarrhea.  Infectious disease service consulted for further antibiotic recommendations.    Review Of Systems:  All other ROS were reviewed and are negative except as noted above in the HPI.    PMH:   Past Medical History:   Diagnosis Date   • Abdominal pain    • Anginal syndrome     random chest pain especially with tachycardia   • Apnea, sleep    • Arrhythmia     \"sinus tachycardia\", cariologist, Dr. Kumar; ablation 2/2016   • Arthritis     osteo   • ASTHMA     when around smoke   • Atrial fibrillation " "(Formerly [facility])    • Back pain    • Borderline personality disorder    • Breath shortness     with tachycardia   • Cardiac arrhythmia    • Chickenpox    • Chronic UTI 9/18/2010   • Cough    • Daytime sleepiness    • Depression    • Diabetes (HCC)    • Diarrhea    • Disorder of thyroid    • Fall    • Fatigue    • Frequent headaches    • Gasping for breath    • Gynecological disorder     PCOS   • Headache(784.0)    • Heart burn    • History of falling    • Hypertension    • Migraine    • 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HCC)    • Multiple personality disorder    • Nausea    • Obesity    • Pain 08-15-12    back, 7/10   • Painful joint    • PCOS (polycystic ovarian syndrome)    • Pneumonia 2012   • Psychosis    • Renal disorder     \"kidney disease, stage 1\" nephrologist, Dr. 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   • Urinary bladder disorder     Suprapubic cath   • Urinary incontinence    • Weakness    • Wears glasses        PSH:  Past Surgical History:   Procedure Laterality Date   • MUSCLE BIOPSY Right 1/26/2017    Procedure: MUSCLE BIOPSY - THIGH;  Surgeon: Isidro Vigil M.D.;  Location: [facility];  Service:    • GASTROSCOPY WITH BALLOON DILATATION N/A 1/4/2017    Procedure: GASTROSCOPY WITH DILATATION;  Surgeon: Torres Vargas M.D.;  Location: [facility];  Service:    • BOWEL STIMULATOR INSERTION  7/13/2016    Procedure: BOWEL STIMULATOR INSERTION FOR PERMANENT INTERSTIM SACRAL IMPLANT;  Surgeon: Joe Noyola M.D.;  Location: [facility];  Service:    • RECOVERY  1/27/2016    Procedure: CATH LAB EP STUDY WITH SINUS NODE MODIFICATION ABHINAV;  Surgeon: Recoveryonly Surgery;  Location: SURGERY [facility];  Service:    • OTHER CARDIAC SURGERY  1/2016    cardiac " "ablation   • NEURO DEST FACET L/S W/IG SNGL  4/21/2015    Performed by Reza Tabor at [facility]   • LUMBAR FUSION ANTERIOR  8/21/2012    Performed by SUSIE SAWANT at SUR[facility]ERY [facility] ORS   • ALYSSA BY LAPAROSCOPY  8/29/2010    Performed by SHAYY JOHNS at SUR[facility] ORS   • LAMINOTOMY     • OTHER ABDOMINAL SURGERY     • TONSILLECTOMY      tonsillectomy       FAMILY HX:  Family History   Problem Relation Age of Onset   • Hypertension Mother    • Sleep Apnea Mother    • Heart Disease Mother    • Other Mother         hypothryod   • Hypertension Maternal Uncle    • Heart Disease Maternal Grandmother    • Hypertension Maternal Grandmother    • No Known Problems Sister    • Other Sister         Narcolepsy;fibromyalsia;bone;nerve   • Genitourinary () Sister         endometriosis       SOCIAL HX:  Social History     Social History   • Marital status: Single     Spouse name: N/A   • Number of children: N/A   • Years of education: N/A     Occupational History   • Not on file.     Social History Main Topics   • Smoking status: Never Smoker   • Smokeless tobacco: Never Used   • Alcohol use No   • Drug use: Yes     Types: Marijuana, Inhaled      Comment: edibles, MJ   • Sexual activity: Not Currently     Birth control/ protection: Pill     Other Topics Concern   • Not on file     Social History Narrative    ** Merged History Encounter **          History   Smoking Status   • Never Smoker   Smokeless Tobacco   • Never Used     History   Alcohol Use No       Allergies/Intolerances:  Allergies   Allergen Reactions   • Cefdinir Shortness of Breath and Itching     Tolerated 1/18/17  Tolerates ceftriaxone    • Depakote [Divalproex Sodium] Unspecified     Muscle spasms/muscle pain and weakness     • Doxycycline Anaphylaxis and Vomiting     RXN=unknown   • Abilify Unspecified     Headaches/muscle twitching     • Amitriptyline Unspecified     Headaches     • Amoxicillin Rash     Pt states \"I get a " "rash\".     • Ciprofloxacin Rash     Pt states \"I get a rash\".     • Clindamycin Nausea     Even with food     • Ees [Erythromycin] Vomiting and Nausea   • Flagyl [Metronidazole Hcl] Unspecified     \"eye problems\"     • Flomax [Tamsulosin Hydrochloride] Swelling   • Metformin Unspecified     Increased lactic acid      • Sulfa Drugs Hives and Rash     RXN=since childhood   • Tape Rash     Tears skin off   coban with Tegaderm tape ok  RXN=ongoing   • Vancomycin Itching     Pt becomes flushed in face and chest.   RXN=7/10/16   • Wound Dressing Adhesive Hives     By pt report   • Cephalexin [Keflex] Rash     Pt states she gets a rash with this medication  Tolerates ceftriaxone   • Erythromycin Rash     .   • Levofloxacin Unspecified     Leg muscle cramps   • Metronidazole Rash     .   • Valproic Acid Rash     .       History reviewed with the patient    Other Current Medications:    Current Facility-Administered Medications:   •  ivabradine (CORLANOR) tablet 5 mg, 5 mg, Oral, BID WITH MEALS, Jesenia Perkins M.D., 5 mg at 08/05/18 1745  •  ondansetron (ZOFRAN) syringe/vial injection 4 mg, 4 mg, Intravenous, Q4HRS PRN, Rolo Bryson M.D., 4 mg at 08/06/18 0833  •  busPIRone (BUSPAR) tablet 10 mg, 10 mg, Oral, BID, Rolo Bryson M.D., 10 mg at 08/06/18 0510  •  aspirin EC (ECOTRIN) tablet 81 mg, 81 mg, Oral, DAILY, Hari Bowman M.D., 81 mg at 08/06/18 0511  •  baclofen (LIORESAL) tablet 10 mg, 10 mg, Oral, BID, Hari Bowman M.D., 10 mg at 08/06/18 0509  •  cholestyramine (QUESTRAN,PREVALITE) 4 GM powder 4 g, 1 Packet, Oral, DAILY, Hari Bowman M.D., 4 g at 08/03/18 0532  •  FLUoxetine (PROZAC) capsule 10 mg, 10 mg, Oral, DAILY, Hari Bowman M.D., 10 mg at 08/06/18 0510  •  HYDROcodone-acetaminophen (NORCO) 5-325 MG per tablet 1 Tab, 1 Tab, Oral, Q6HRS PRN, Hari Bowman M.D., 1 Tab at 08/06/18 0220  •  mupirocin (BACTROBAN) 2 % ointment, , Topical, BID, Hari Bowman M.D., 1 Application at 08/06/18 " "0513  •  prednisoLONE acetate (PRED FORTE) 1 % ophthalmic suspension 1 Drop, 1 Drop, Right Eye, Q2HRS PRN, Hari Bowman M.D.  •  SITagliptin (JANUVIA) tablet 100 mg, 100 mg, Oral, DAILY, Hari Bowman M.D., 100 mg at 08/06/18 0509  •  sodium bicarbonate tablet 650 mg, 650 mg, Oral, BID, Hari Bowman M.D., 650 mg at 08/06/18 0510  •  traZODone (DESYREL) tablet 100 mg, 100 mg, Oral, QHS, Hari Bowman M.D., 100 mg at 08/05/18 2014  •  ziprasidone (GEODON) capsule 80 mg, 80 mg, Oral, BID, Hari Bowman M.D., 80 mg at 08/06/18 0511  •  senna-docusate (PERICOLACE or SENOKOT S) 8.6-50 MG per tablet 2 Tab, 2 Tab, Oral, BID, 2 Tab at 08/04/18 1831 **AND** polyethylene glycol/lytes (MIRALAX) PACKET 1 Packet, 1 Packet, Oral, QDAY PRN **AND** magnesium hydroxide (MILK OF MAGNESIA) suspension 30 mL, 30 mL, Oral, QDAY PRN **AND** bisacodyl (DULCOLAX) suppository 10 mg, 10 mg, Rectal, QDAY PRN, Hari Bowman M.D.  •  acetaminophen (TYLENOL) tablet 650 mg, 650 mg, Oral, Q6HRS PRN, Hari Bowman M.D., 650 mg at 08/06/18 0220  •  insulin regular (HUMULIN R) injection 1-6 Units, 1-6 Units, Subcutaneous, 4X/DAY ACHS, Stopped at 08/02/18 2100 **AND** Accu-Chek ACHS, , , Q AC AND BEDTIME(S) **AND** NOTIFY MD and PharmD, , , Once **AND** glucose 4 g chewable tablet 16 g, 16 g, Oral, Q15 MIN PRN **AND** dextrose 50% (D50W) injection 25 mL, 25 mL, Intravenous, Q15 MIN PRN, Hari Bowman M.D.  •  cefTRIAXone (ROCEPHIN) 2 g in  mL IVPB, 2 g, Intravenous, Q EVENING, Hari Bowman M.D., Stopped at 08/05/18 1805  •  gabapentin (NEURONTIN) capsule 600 mg, 600 mg, Oral, DAILY, 600 mg at 08/06/18 0509 **AND** gabapentin (NEURONTIN) capsule 1,200 mg, 1,200 mg, Oral, Q EVENING, Hari Bowman M.D., 1,200 mg at 08/05/18 1082  [unfilled]    Most Recent Vital Signs:  /68   Pulse 81   Temp 36.4 °C (97.6 °F)   Resp 18   Ht 1.651 m (5' 5\")   Wt 122.8 kg (270 lb 11.6 oz)   LMP  (LMP Unknown) Comment: 2-3 years ago  SpO2 93%   " Breastfeeding? No   BMI 45.05 kg/m²   Temp  Av.6 °C (97.8 °F)  Min: 36 °C (96.8 °F)  Max: 37.2 °C (98.9 °F)    Physical Exam:  General: Morbidly obese, well-appearing, no acute distress, nontoxic  HEENT: sclera anicteric, PERRL, EOMI, MMM, no oral lesions  Neck: supple, no lymphadenopathy  Chest: CTAB, no r/r/w, normal work of breathing.  Left breast wound with minimal surrounding erythema and scant drainage.  It appears erythema is resolving overall  Cardiac: Decreased heart sounds, normal S1 S2, no m/r/g   Abdomen: + bowel sounds, soft, non-tender, non-distended, no HSM  Extremities: WWP, no edema, 2+ pulses  Skin: no rashes or worrisome lesions  Neuro: Alert and oriented times 3, non-focal exam, speech fluent, moves all extremities  Psych: Mood and affect normal    Pertinent Lab Results:  Recent Labs      18   0450   WBC  5.4      Recent Labs      18   0450   HEMOGLOBIN  14.6   HEMATOCRIT  42.8   MCV  91.1   MCH  31.1   PLATELETCT  165         Recent Labs      18   0450   SODIUM  140   POTASSIUM  4.2   CHLORIDE  107   CO2  22   CREATININE  0.74        Recent Labs      18   0450   ALBUMIN  4.8        Pertinent Micro:  Results     Procedure Component Value Units Date/Time    CULTURE WOUND W/ GRAM STAIN [[id number]] Collected:  18 0820    Order Status:  Completed Specimen:  Wound Updated:  18 1114     Gram Stain Result Few WBCs.  Rare Gram positive cocci.       Significant Indicator NEG     Source WND     Site Chest     Culture Result Wound Moderate growth mixed skin letty.    URINE CULTURE(NEW) [[id number]] Collected:  18 1535    Order Status:  Completed Specimen:  Urine Updated:  18 08     Significant Indicator NEG     Source UR     Site --     Urine Culture Mixed skin letty >100,000 cfu/mL    Narrative:       Indication for culture:->Emergency Room Patient    GRAM STAIN [[id number]] Collected:  18 0820    Order Status:  Completed Specimen:  Wound Updated:  " 08/03/18 1657     Significant Indicator .     Source WND     Site Chest     Gram Stain Result Few WBCs.  Rare Gram positive cocci.      BLOOD CULTURE [[id number]] Collected:  08/02/18 1230    Order Status:  Completed Specimen:  Blood from Peripheral Updated:  08/03/18 0843     Significant Indicator NEG     Source BLD     Site PERIPHERAL     Blood Culture No Growth    Note: Blood cultures are incubated for 5 days and  are monitored continuously.Positive blood cultures  are called to the RN and reported as soon as  they are identified.      Narrative:       1 of 2 for Blood Culture x 2 sites order. Per Hospital  Policy: Only change Specimen Src: to \"Line\" if specified by  physician order.    BLOOD CULTURE [[id number]] Collected:  08/02/18 1256    Order Status:  Completed Specimen:  Blood from Peripheral Updated:  08/03/18 0843     Significant Indicator NEG     Source BLD     Site PERIPHERAL     Blood Culture No Growth    Note: Blood cultures are incubated for 5 days and  are monitored continuously.Positive blood cultures  are called to the RN and reported as soon as  they are identified.      Narrative:       2 of 2 blood culture x2  Sites order. Per Hospital Policy:  Only change Specimen Src: to \"Line\" if specified by physician  order.    CULTURE WOUND W/ GRAM STAIN [[id number]]     Order Status:  No result Specimen:  Wound from Chest     URINALYSIS [[id number]]  (Abnormal) Collected:  08/02/18 1535    Order Status:  Completed Specimen:  Urine Updated:  08/02/18 1602     Color Yellow     Character Clear     Specific Gravity 1.023     Ph 8.5 (A)     Glucose Negative mg/dL      Ketones Negative mg/dL      Protein Negative mg/dL      Bilirubin Negative     Urobilinogen, Urine 1.0     Nitrite Negative     Leukocyte Esterase Small (A)     Occult Blood Negative     Micro Urine Req Microscopic    Narrative:       Indication for culture:->Emergency Room Patient        Blood Culture   Date Value Ref Range Status   08/02/2018   " Preliminary    No Growth    Note: Blood cultures are incubated for 5 days and  are monitored continuously.Positive blood cultures  are called to the RN and reported as soon as  they are identified.          Studies:  Ct-lspine W/o Plus Recons    Result Date: 7/14/2018 7/14/2018 4:52 AM HISTORY/REASON FOR EXAM: Atraumatic Pain/Radiculopathy TECHNIQUE/EXAM DESCRIPTION:  CT of the lumbar spine without contrast, with reconstructions. Transaxial scans of the lumbar spine without IV contrast. Sagittal reconstruction was performed. Low dose optimization technique was utilized for this CT exam including automated exposure control and adjustment of the mA and/or kV according to patient size. COMPARISON: None FINDINGS: There is normal alignment of the lumbar spine. No fracture or listhesis is identified. Vertebral body height is well maintained. There is anterior lumbar spine fusion seen at L4/L5. Facet arthrosis of L5/S1 is seen bilaterally, greater on the left resulting in mild bilateral neural foraminal narrowing. The prevertebral soft tissues and paraspinal soft tissues appear within normal limits.   The visualized thoracic and abdominal soft tissues appear within physiologic limits. There is 2.5 x 2.1 cm masslike structure identified at the tip of the appendix.     1.  No acute traumatic bony injury of the lumbar spine. 2.  Mild bilateral neural foraminal narrowing at L5/S1 due to facet arthrosis. 3.  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 Recommend surgical referral for further workup and management. These findings were discussed with the patient's clinician, LUANA NEGRON, on 7/14/2018 5:17 AM.    Ct-lspine With Plus Recons    Result Date: 7/14/2018 7/14/2018 3:31 PM HISTORY/REASON FOR EXAM: Low back pain. TECHNIQUE/EXAM DESCRIPTION AND NUMBER OF VIEWS: CT lumbar spine with contrast, with reconstructions. Thin-section axial helical images were obtained from T12  through S1 following the intrathecal administration of 12 mL of Omnipaque 180 nonionic contrast. Sagittal and coronal reconstructions were generated from the axial images. Low dose optimization technique was utilized for this CT exam including automated exposure control and adjustment of the mA and/or kV according to patient size. COMPARISON: Unenhanced CT examination of the lumbar spine FINDINGS: There is good opacification of the lumbar thecal sac following contrast injection via lumbar puncture. No lumbar vertebral body compression fracture or subluxation is present. Anterior fusion plate and screws and discectomy change is present at L4-5. T12-L1: No disc herniation or bony foraminal stenosis is present. The conus medullaris appears normal. L1-2: No disc herniation or bony foraminal stenosis. L2-3: No disc herniation or bony foraminal stenosis. L3-4: There is minimal diffuse annular bulging of the disc without significant effacement of the thecal sac. No bony foraminal stenosis. L4-5: No disc herniation or bony foraminal stenosis. L5-S1: No disc herniation or bony foraminal stenosis. There is moderate bilateral facet arthropathy at L5-S1. A single electrode courses through the upper sacrum to the presacral space.     1.  Good alignment of the lumbar spine with anterior spinal fusion hardware and disc graft material in place at L4-5. 2.  Minimal diffuse annular bulging of the L3-4 disc without effacement of thecal sac. 3.  Remainder of the lumbar disc spaces appear normal. No bony foraminal stenosis. 4.  Bilateral facet arthropathy L5-S1.    Dx-knee 3 Views Left    Result Date: 7/14/2018 7/14/2018 4:18 AM HISTORY/REASON FOR EXAM: Pain/Deformity Following Trauma TECHNIQUE/EXAM DESCRIPTION:  AP, lateral, and oblique views of the LEFT knee. COMPARISON:  None. FINDINGS: The bony structures and articulations appear within normal limits without visualized fracture, subluxation, or dislocation.     1.  No acute  traumatic bony injury.    Dx-lumbar Puncture For Ct Only    Result Date: 7/15/2018  7/14/2018 3:39 PM HISTORY/REASON FOR EXAM:  Atraumatic Pain. Severe back pain with numbness/paralysis to the lower extremities TECHNIQUE/EXAM DESCRIPTION AND NUMBER OF VIEWS: Fluoroscopic-guided lumbar puncture. 2 fluoroscopic images obtained. Fluoroscopy time: 42 seconds PROCEDURE: Informed consent was obtained. A timeout was taken. With the patient in prone position, utilizing fluoroscopic guidance, satisfactory site for access was identified at the L3-4 level. This region was prepared and draped in a sterile manner. Local anesthesia with 1% lidocaine was administered. A 22  gauge spinal needle was advanced into the subarachnoid space. After return of clear CSF, 12 cc of Omnipaque 180 (for intrathecal administration) was injected with fluoroscopic guidance. The patient tolerated the procedure well with no evidence of complication.     Successful fluoroscopic-guided lumbar puncture with intrathecal contrast administration for subsequent CT myelogram.      IMPRESSION:   1.  Left breast cellulitis   2.  Multiple psychiatric disorders  3.  Multiple antibiotic allergies      PLAN:   Kristin Balderrama is a 27 y.o. morbidly obese woman with a history of multiple psychiatric disorders and multiple antibiotic allergies admitted for worsening left breast cellulitis with failure of outpatient antibiotics.  She was started on ceftriaxone and appears to be improving clinically with decreased erythema, drainage and edema.  Oral linezolid would been a great oral option, however we will avoid this given her multiple antipsychotic medications.  Oral antibiotic options are extremely limited given her allergies to Bactrim and doxycycline.  As such we will continue her on IV ceftriaxone.  Plan for a midline and anticipate a total course of 10 days.  Plan stop date 08/12/18.  Patient would like to go [facility].       Plan of care discussed with  ERMA Perkins M.D.. Will continue to follow    Lou Calderón M.D.

## 2018-08-06 NOTE — ADDENDUM NOTE
Encounter addended by: Luz Maria Andersen on: 8/6/2018  4:03 PM<BR>    Actions taken: Charge Capture section accepted

## 2018-08-06 NOTE — PROGRESS NOTES
[facility] Hospitalist Progress Note    Date of Service: 2018    Chief Complaint  27 y.o. female admitted 2018 with cellulitis of left breast    Interval Problem Update  Improved. Difficulty with finding suitable oral agent. Plan was to discharge today; awaiting ID recommendation.     Consultants/Specialty  ID to assist with suitable oral agent on discharge due to numerous drugallergies.     Disposition  pending        Review of Systems   Constitutional: Negative for chills, fever and weight loss.   HENT: Negative for ear pain, hearing loss and tinnitus.    Eyes: Negative for blurred vision, double vision and photophobia.   Respiratory: Negative for cough, hemoptysis and sputum production.    Cardiovascular: Negative for chest pain, palpitations and orthopnea.   Gastrointestinal: Negative for heartburn, nausea and vomiting.   Genitourinary: Negative for dysuria, frequency and urgency.   Musculoskeletal: Negative for back pain, myalgias and neck pain.   Skin: Positive for rash (on the left breast).   Neurological: Negative for dizziness, tingling and headaches.      Physical Exam  Laboratory/Imaging   Hemodynamics  Temp (24hrs), Av.6 °C (97.8 °F), Min:36.2 °C (97.2 °F), Max:36.9 °C (98.5 °F)   Temperature: 36.7 °C (98.1 °F)  Pulse  Av.9  Min: 41  Max: 112    Blood Pressure: 150/92 (RN notified)      Respiratory      Respiration: 18, Pulse Oximetry: 95 %        RUL Breath Sounds: Clear, RML Breath Sounds: Clear, RLL Breath Sounds: Clear;Diminished, MARY Breath Sounds: Clear, LLL Breath Sounds: Clear;Diminished    Fluids  No intake or output data in the 24 hours ending 18    Nutrition  Orders Placed This Encounter   Procedures   • Diet Order Diabetic     Standing Status:   Standing     Number of Occurrences:   1     Order Specific Question:   Diet:     Answer:   Diabetic [3]     Order Specific Question:   Calorie modifications:     Answer:   1800 kcals [4]     Physical Exam   Constitutional: She  is oriented to person, place, and time. She appears well-developed and well-nourished.   HENT:   Head: Normocephalic and atraumatic.   Eyes: Pupils are equal, round, and reactive to light. Conjunctivae are normal.   Neck: Normal range of motion. Neck supple.   Cardiovascular: Normal rate and regular rhythm.    Pulmonary/Chest: Effort normal and breath sounds normal.   Abdominal: Soft. Bowel sounds are normal.   Musculoskeletal: She exhibits no edema or tenderness.   Neurological: She is alert and oriented to person, place, and time.   Skin: Skin is warm. No erythema (minro erythem with skin abrasion on the lateral upper side of the left breast).   No obvious erythema of any significance today. The affected breast area now looks like a an uninfected wound.            Recent Labs      08/04/18   0450   WBC  5.4   RBC  4.70   HEMOGLOBIN  14.6   HEMATOCRIT  42.8   MCV  91.1   MCH  31.1   MCHC  34.1   RDW  41.4   PLATELETCT  165   MPV  11.1     Recent Labs      08/04/18   0450   SODIUM  140   POTASSIUM  4.2   CHLORIDE  107   CO2  22   GLUCOSE  101*   BUN  7*   CREATININE  0.74   CALCIUM  9.4                      Assessment/Plan     * Cellulitis of left breast   Assessment & Plan    Clinically improved on ceftriaxone. Due to numerous drug allergies, I am not comfortable sending her home on a similar class oral agent without ID evaluation. Consulted ID for appropriate medication, opted to see patient. Awaiting ID recommendation before discharge. Otherwise, she is clinically stable for discharge.          Type 2 diabetes mellitus without complication, without long-term current use of insulin (HCC)- (present on admission)   Assessment & Plan    Continue SSI  hema1c 6.8  Januvia        Chronic pain syndrome- (present on admission)   Assessment & Plan    Resume outpatient medications, baclofen and Neurontin        Morbid obesity with BMI of 45.0-49.9, adult (HCC)- (present on admission)   Assessment & Plan    We will need  ongoing weight loss encouragement, counseling        Psychosis, schizophrenia, simple (HCC)- (present on admission)   Assessment & Plan    Without active psychosis resume outpatient medications          Quality-Core Measures   Andrade catheter::  No Andrade

## 2018-08-06 NOTE — PROGRESS NOTES
Vascular Access Team    Date of Insertion: 8/6/18  Arm Circumference: 41  Internal length: 20  External Length: 0  Vein Occupancy %: 22  Reason for Midline: IV abx  Labs: WBC 5.4, , INR 1.02, BUN 7, Cr 0.74, GFR >60    Orders confirmed, vessel patency confirmed with ultrasound. Risks and benefits of procedure explained to patient and education regarding line associated bloodstream infections provided. Questions answered.     Power Midline placed in LUE per MD order with ultrasound guidance, initial arm circumference 41cm. 4 Fr, 1 lumen Power Midline placed in right basilic vein after 1 attempt(s). 2 cc's of 1% lidocaine injected intradermally, 21 gauge microintroducer needle and modified Seldinger technique used. 20 cm catheter inserted with good blood return. Secured at 0 cm marker. Each lumen flushed without resistance with 10 mL 0.9% normal saline. Midline secured with Biopatch and Tegaderm.     Midline placement is confirmed by nurse using ultrasound and ability to flush and draw blood. Midline is appropriate for use at this time.  No X-ray is needed for placement confirmation. Pt tolerated procedure well, and verbalized minimal pain during procedure which was resolved by lidocaine injection.  Patient verbalized no pain after procedure.  Patient condition relayed to unit RN or ordering physician via this post procedure note in the EMR.      BARD Power Midline ref # [id number], Lot # [id number]

## 2018-08-06 NOTE — PROGRESS NOTES
Dr. Jefferson rounding with pt, grandmother at bedside, all questions answered.    The patient is a 31y Male complaining of

## 2018-08-07 DIAGNOSIS — N61.0 CELLULITIS OF LEFT BREAST: ICD-10-CM

## 2018-08-07 LAB
BACTERIA BLD CULT: NORMAL
BACTERIA BLD CULT: NORMAL
GLUCOSE BLD-MCNC: 129 MG/DL (ref 65–99)
GLUCOSE BLD-MCNC: 129 MG/DL (ref 65–99)
GLUCOSE BLD-MCNC: 131 MG/DL (ref 65–99)
GLUCOSE BLD-MCNC: 132 MG/DL (ref 65–99)
GLUCOSE BLD-MCNC: 139 MG/DL (ref 65–99)
SIGNIFICANT IND 70042: NORMAL
SIGNIFICANT IND 70042: NORMAL
SITE SITE: NORMAL
SITE SITE: NORMAL
SOURCE SOURCE: NORMAL
SOURCE SOURCE: NORMAL

## 2018-08-07 PROCEDURE — A9270 NON-COVERED ITEM OR SERVICE: HCPCS | Performed by: FAMILY MEDICINE

## 2018-08-07 PROCEDURE — 770006 HCHG ROOM/CARE - MED/SURG/GYN SEMI*

## 2018-08-07 PROCEDURE — 99233 SBSQ HOSP IP/OBS HIGH 50: CPT | Performed by: INTERNAL MEDICINE

## 2018-08-07 PROCEDURE — 700102 HCHG RX REV CODE 250 W/ 637 OVERRIDE(OP): Performed by: FAMILY MEDICINE

## 2018-08-07 PROCEDURE — 700111 HCHG RX REV CODE 636 W/ 250 OVERRIDE (IP): Performed by: HOSPITALIST

## 2018-08-07 PROCEDURE — 665998 HH PPS REVENUE CREDIT

## 2018-08-07 PROCEDURE — 82962 GLUCOSE BLOOD TEST: CPT | Mod: 91

## 2018-08-07 PROCEDURE — 99233 SBSQ HOSP IP/OBS HIGH 50: CPT | Performed by: HOSPITALIST

## 2018-08-07 PROCEDURE — 665999 HH PPS REVENUE DEBIT

## 2018-08-07 PROCEDURE — 700105 HCHG RX REV CODE 258: Performed by: HOSPITALIST

## 2018-08-07 PROCEDURE — 700102 HCHG RX REV CODE 250 W/ 637 OVERRIDE(OP): Performed by: HOSPITALIST

## 2018-08-07 PROCEDURE — 700111 HCHG RX REV CODE 636 W/ 250 OVERRIDE (IP): Performed by: FAMILY MEDICINE

## 2018-08-07 PROCEDURE — A9270 NON-COVERED ITEM OR SERVICE: HCPCS | Performed by: HOSPITALIST

## 2018-08-07 RX ADMIN — SODIUM BICARBONATE 650 MG: 650 TABLET ORAL at 17:46

## 2018-08-07 RX ADMIN — ZIPRASIDONE HCL 80 MG: 40 CAPSULE ORAL at 17:46

## 2018-08-07 RX ADMIN — ACETAMINOPHEN 650 MG: 325 TABLET, FILM COATED ORAL at 17:53

## 2018-08-07 RX ADMIN — ONDANSETRON 4 MG: 2 INJECTION INTRAMUSCULAR; INTRAVENOUS at 01:52

## 2018-08-07 RX ADMIN — HYDROCODONE BITARTRATE AND ACETAMINOPHEN 1 TABLET: 5; 325 TABLET ORAL at 08:17

## 2018-08-07 RX ADMIN — FLUOXETINE 10 MG: 10 CAPSULE ORAL at 06:00

## 2018-08-07 RX ADMIN — BACLOFEN 10 MG: 10 TABLET ORAL at 17:46

## 2018-08-07 RX ADMIN — ONDANSETRON 4 MG: 2 INJECTION INTRAMUSCULAR; INTRAVENOUS at 17:45

## 2018-08-07 RX ADMIN — BUSPIRONE HYDROCHLORIDE 10 MG: 10 TABLET ORAL at 17:46

## 2018-08-07 RX ADMIN — CEFTRIAXONE SODIUM 2 G: 2 INJECTION, POWDER, FOR SOLUTION INTRAMUSCULAR; INTRAVENOUS at 17:45

## 2018-08-07 RX ADMIN — GABAPENTIN 600 MG: 300 CAPSULE ORAL at 06:00

## 2018-08-07 RX ADMIN — MUPIROCIN 1 APPLICATION: 20 OINTMENT TOPICAL at 17:45

## 2018-08-07 RX ADMIN — MUPIROCIN 1 APPLICATION: 20 OINTMENT TOPICAL at 06:00

## 2018-08-07 RX ADMIN — ONDANSETRON 4 MG: 2 INJECTION INTRAMUSCULAR; INTRAVENOUS at 08:17

## 2018-08-07 RX ADMIN — HYDROCODONE BITARTRATE AND ACETAMINOPHEN 1 TABLET: 5; 325 TABLET ORAL at 01:52

## 2018-08-07 RX ADMIN — BACLOFEN 10 MG: 10 TABLET ORAL at 06:00

## 2018-08-07 RX ADMIN — GABAPENTIN 1200 MG: 400 CAPSULE ORAL at 17:46

## 2018-08-07 RX ADMIN — ASPIRIN 81 MG: 81 TABLET, COATED ORAL at 06:00

## 2018-08-07 RX ADMIN — ONDANSETRON 4 MG: 2 INJECTION INTRAMUSCULAR; INTRAVENOUS at 12:15

## 2018-08-07 RX ADMIN — SODIUM BICARBONATE 650 MG: 650 TABLET ORAL at 06:00

## 2018-08-07 RX ADMIN — HYDROCODONE BITARTRATE AND ACETAMINOPHEN 1 TABLET: 5; 325 TABLET ORAL at 14:58

## 2018-08-07 RX ADMIN — ZIPRASIDONE HCL 80 MG: 40 CAPSULE ORAL at 06:00

## 2018-08-07 RX ADMIN — SITAGLIPTIN 100 MG: 100 TABLET, FILM COATED ORAL at 06:00

## 2018-08-07 RX ADMIN — TRAZODONE HYDROCHLORIDE 100 MG: 50 TABLET ORAL at 20:25

## 2018-08-07 RX ADMIN — BUSPIRONE HYDROCHLORIDE 10 MG: 10 TABLET ORAL at 06:00

## 2018-08-07 ASSESSMENT — ENCOUNTER SYMPTOMS
VOMITING: 0
BACK PAIN: 0
NAUSEA: 0
HEADACHES: 0
SPUTUM PRODUCTION: 0
ABDOMINAL PAIN: 1
ORTHOPNEA: 0
SHORTNESS OF BREATH: 0
FEVER: 0
PHOTOPHOBIA: 0
HEARTBURN: 0
MYALGIAS: 0
PALPITATIONS: 0
COUGH: 0
DIZZINESS: 0
TINGLING: 0
WEIGHT LOSS: 0
HEMOPTYSIS: 0
DIARRHEA: 1
BLURRED VISION: 0
NECK PAIN: 0
DOUBLE VISION: 0
CHILLS: 0

## 2018-08-07 ASSESSMENT — PAIN SCALES - GENERAL
PAINLEVEL_OUTOF10: 8
PAINLEVEL_OUTOF10: 8
PAINLEVEL_OUTOF10: 7
PAINLEVEL_OUTOF10: 7
PAINLEVEL_OUTOF10: 8
PAINLEVEL_OUTOF10: 7
PAINLEVEL_OUTOF10: 7

## 2018-08-07 NOTE — PROGRESS NOTES
Infectious Disease Progress Note    Author: Lou Calderón M.D. Date & Time of service: 2018  10:55 AM    Chief Complaint:  FU left breast cellulitis    Interval History:   AF having 3-4 watery BM since on IV abx with some abd cramping  Labs Reviewed, Medications Reviewed, Radiology Reviewed and Wound Reviewed.    Review of Systems:  Review of Systems   Constitutional: Negative for chills and fever.   Respiratory: Negative for cough and shortness of breath.    Gastrointestinal: Positive for abdominal pain and diarrhea. Negative for nausea and vomiting.       Hemodynamics:  Temp (24hrs), Av.6 °C (97.8 °F), Min:36.2 °C (97.2 °F), Max:36.7 °C (98.1 °F)  Temperature: 36.2 °C (97.2 °F)  Pulse  Av.4  Min: 41  Max: 112   Blood Pressure: 135/71       Physical Exam:  Physical Exam   Constitutional: She is oriented to person, place, and time. She appears well-developed and well-nourished.   Obese   HENT:   Head: Normocephalic and atraumatic.   Eyes: Pupils are equal, round, and reactive to light. EOM are normal.   Neck: Neck supple.   Cardiovascular: Normal rate and normal heart sounds.    Pulmonary/Chest: Effort normal. She has no wheezes.   Left breast wound dressed. Scant bloody drainage. Decreased erythema overall   Abdominal: Soft. There is no tenderness.   Musculoskeletal: Normal range of motion.   LUE midline nontender   Neurological: She is alert and oriented to person, place, and time.       Meds:    Current Facility-Administered Medications:   •  ivabradine  •  ondansetron  •  busPIRone  •  aspirin EC  •  baclofen  •  cholestyramine  •  FLUoxetine  •  HYDROcodone-acetaminophen  •  mupirocin  •  prednisoLONE acetate  •  SITagliptin  •  sodium bicarbonate  •  traZODone  •  ziprasidone  •  senna-docusate **AND** polyethylene glycol/lytes **AND** magnesium hydroxide **AND** bisacodyl  •  acetaminophen  •  insulin regular **AND** Accu-Chek ACHS **AND** NOTIFY MD and PharmD **AND** glucose 4 g **AND**  dextrose 50%  •  cefTRIAXone (ROCEPHIN) IV  •  gabapentin **AND** gabapentin    Labs:  No results for input(s): WBC, RBC, HEMOGLOBIN, HEMATOCRIT, MCV, MCH, RDW, PLATELETCT, MPV, NEUTSPOLYS, LYMPHOCYTES, MONOCYTES, EOSINOPHILS, BASOPHILS, RBCMORPHOLO in the last 72 hours.  No results for input(s): SODIUM, POTASSIUM, CHLORIDE, CO2, GLUCOSE, BUN, CPKTOTAL in the last 72 hours.  No results for input(s): ALBUMIN, TBILIRUBIN, ALKPHOSPHAT, TOTPROTEIN, ALTSGPT, ASTSGOT, CREATININE in the last 72 hours.    Imaging:  Ct-lspine W/o Plus Recons    Result Date: 7/14/2018 7/14/2018 4:52 AM HISTORY/REASON FOR EXAM: Atraumatic Pain/Radiculopathy TECHNIQUE/EXAM DESCRIPTION:  CT of the lumbar spine without contrast, with reconstructions. Transaxial scans of the lumbar spine without IV contrast. Sagittal reconstruction was performed. Low dose optimization technique was utilized for this CT exam including automated exposure control and adjustment of the mA and/or kV according to patient size. COMPARISON: None FINDINGS: There is normal alignment of the lumbar spine. No fracture or listhesis is identified. Vertebral body height is well maintained. There is anterior lumbar spine fusion seen at L4/L5. Facet arthrosis of L5/S1 is seen bilaterally, greater on the left resulting in mild bilateral neural foraminal narrowing. The prevertebral soft tissues and paraspinal soft tissues appear within normal limits.   The visualized thoracic and abdominal soft tissues appear within physiologic limits. There is 2.5 x 2.1 cm masslike structure identified at the tip of the appendix.     1.  No acute traumatic bony injury of the lumbar spine. 2.  Mild bilateral neural foraminal narrowing at L5/S1 due to facet arthrosis. 3.  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 Recommend surgical referral for further workup and management. These findings were discussed with the patient's clinician, LUANA  MIGDALIA, on 7/14/2018 5:17 AM.    Ct-lspine With Plus Recons    Result Date: 7/14/2018 7/14/2018 3:31 PM HISTORY/REASON FOR EXAM: Low back pain. TECHNIQUE/EXAM DESCRIPTION AND NUMBER OF VIEWS: CT lumbar spine with contrast, with reconstructions. Thin-section axial helical images were obtained from T12 through S1 following the intrathecal administration of 12 mL of Omnipaque 180 nonionic contrast. Sagittal and coronal reconstructions were generated from the axial images. Low dose optimization technique was utilized for this CT exam including automated exposure control and adjustment of the mA and/or kV according to patient size. COMPARISON: Unenhanced CT examination of the lumbar spine FINDINGS: There is good opacification of the lumbar thecal sac following contrast injection via lumbar puncture. No lumbar vertebral body compression fracture or subluxation is present. Anterior fusion plate and screws and discectomy change is present at L4-5. T12-L1: No disc herniation or bony foraminal stenosis is present. The conus medullaris appears normal. L1-2: No disc herniation or bony foraminal stenosis. L2-3: No disc herniation or bony foraminal stenosis. L3-4: There is minimal diffuse annular bulging of the disc without significant effacement of the thecal sac. No bony foraminal stenosis. L4-5: No disc herniation or bony foraminal stenosis. L5-S1: No disc herniation or bony foraminal stenosis. There is moderate bilateral facet arthropathy at L5-S1. A single electrode courses through the upper sacrum to the presacral space.     1.  Good alignment of the lumbar spine with anterior spinal fusion hardware and disc graft material in place at L4-5. 2.  Minimal diffuse annular bulging of the L3-4 disc without effacement of thecal sac. 3.  Remainder of the lumbar disc spaces appear normal. No bony foraminal stenosis. 4.  Bilateral facet arthropathy L5-S1.    Dx-knee 3 Views Left    Result Date: 7/14/2018 7/14/2018 4:18 AM  HISTORY/REASON FOR EXAM: Pain/Deformity Following Trauma TECHNIQUE/EXAM DESCRIPTION:  AP, lateral, and oblique views of the LEFT knee. COMPARISON:  None. FINDINGS: The bony structures and articulations appear within normal limits without visualized fracture, subluxation, or dislocation.     1.  No acute traumatic bony injury.    Dx-lumbar Puncture For Ct Only    Result Date: 7/15/2018  7/14/2018 3:39 PM HISTORY/REASON FOR EXAM:  Atraumatic Pain. Severe back pain with numbness/paralysis to the lower extremities TECHNIQUE/EXAM DESCRIPTION AND NUMBER OF VIEWS: Fluoroscopic-guided lumbar puncture. 2 fluoroscopic images obtained. Fluoroscopy time: 42 seconds PROCEDURE: Informed consent was obtained. A timeout was taken. With the patient in prone position, utilizing fluoroscopic guidance, satisfactory site for access was identified at the L3-4 level. This region was prepared and draped in a sterile manner. Local anesthesia with 1% lidocaine was administered. A 22  gauge spinal needle was advanced into the subarachnoid space. After return of clear CSF, 12 cc of Omnipaque 180 (for intrathecal administration) was injected with fluoroscopic guidance. The patient tolerated the procedure well with no evidence of complication.     Successful fluoroscopic-guided lumbar puncture with intrathecal contrast administration for subsequent CT myelogram.    Ir-picc Line Placement    Result Date: 8/6/2018  HISTORY/REASON FOR EXAM:   PICC placement. TECHNIQUE/EXAM DESCRIPTION AND NUMBER OF VIEWS:   PICC line insertion with ultrasound guidance.  The procedure was performed using maximal sterile barrier technique including sterile gown, mask, cap, and donning of sterile gloves following appropriate hand hygiene and/or sterile scrub. Patient skin site was prepped with 2% Chlorhexidine solution. FINDINGS:  PICC line insertion with Ultrasound Guidance was performed by qualified nursing staff without the assistance of a Radiologist. PICC  "positioning appropriateness confirmed by 3CG technology; chest xray only needed in the instance 3CG unable to confirm placement.              Ultrasound-guided PICC placement performed by qualified nursing staff as above.       Micro:  Results     Procedure Component Value Units Date/Time    CULTURE WOUND W/ GRAM STAIN [[id number]] Collected:  08/03/18 0820    Order Status:  Completed Specimen:  Wound Updated:  08/05/18 1114     Gram Stain Result Few WBCs.  Rare Gram positive cocci.       Significant Indicator NEG     Source WND     Site Chest     Culture Result Wound Moderate growth mixed skin letty.    URINE CULTURE(NEW) [[id number]] Collected:  08/02/18 1535    Order Status:  Completed Specimen:  Urine Updated:  08/04/18 0802     Significant Indicator NEG     Source UR     Site --     Urine Culture Mixed skin letty >100,000 cfu/mL    Narrative:       Indication for culture:->Emergency Room Patient    GRAM STAIN [[id number]] Collected:  08/03/18 0820    Order Status:  Completed Specimen:  Wound Updated:  08/03/18 1657     Significant Indicator .     Source WND     Site Chest     Gram Stain Result Few WBCs.  Rare Gram positive cocci.      BLOOD CULTURE [[id number]] Collected:  08/02/18 1230    Order Status:  Completed Specimen:  Blood from Peripheral Updated:  08/03/18 0843     Significant Indicator NEG     Source BLD     Site PERIPHERAL     Blood Culture No Growth    Note: Blood cultures are incubated for 5 days and  are monitored continuously.Positive blood cultures  are called to the RN and reported as soon as  they are identified.      Narrative:       1 of 2 for Blood Culture x 2 sites order. Per Hospital  Policy: Only change Specimen Src: to \"Line\" if specified by  physician order.    BLOOD CULTURE [[id number]] Collected:  08/02/18 1256    Order Status:  Completed Specimen:  Blood from Peripheral Updated:  08/03/18 0843     Significant Indicator NEG     Source BLD     Site PERIPHERAL     Blood Culture No " "Growth    Note: Blood cultures are incubated for 5 days and  are monitored continuously.Positive blood cultures  are called to the RN and reported as soon as  they are identified.      Narrative:       2 of 2 blood culture x2  Sites order. Per Hospital Policy:  Only change Specimen Src: to \"Line\" if specified by physician  order.    CULTURE WOUND W/ GRAM STAIN [[id number]]     Order Status:  No result Specimen:  Wound from Chest     URINALYSIS [[id number]]  (Abnormal) Collected:  08/02/18 1163    Order Status:  Completed Specimen:  Urine Updated:  08/02/18 1602     Color Yellow     Character Clear     Specific Gravity 1.023     Ph 8.5 (A)     Glucose Negative mg/dL      Ketones Negative mg/dL      Protein Negative mg/dL      Bilirubin Negative     Urobilinogen, Urine 1.0     Nitrite Negative     Leukocyte Esterase Small (A)     Occult Blood Negative     Micro Urine Req Microscopic    Narrative:       Indication for culture:->Emergency Room Patient          Assessment:  Active Hospital Problems    Diagnosis   • *Cellulitis of left breast [N61.0]   • Type 2 diabetes mellitus without complication, without long-term current use of insulin ([facility]) [E11.9]   • Morbid obesity with BMI of 45.0-49.9, adult ([facility]) [E66.01, Z68.42]   • Psychosis, schizophrenia, simple ([facility]) [F20.89]   • Chronic pain syndrome [G89.4]       Plan:  Cellulitis of left breast, improving  ? 2/2 spider bite  Wound cx - mixed skin letty  No fevers  No leukocytosis  Tolerating ceftriaxone  Plan for 10 days total  Stop date 08/12/18  R-OPIC orders done on 8/6  Avoiding linezolid as pt on multiple antidepressant meds  Multiple abx allergies    Diarrhea, new  Check C diff    DM2  HgA1c 6.8   Maintain BS less than 150      Discussed with internal medicine.  "

## 2018-08-07 NOTE — DISCHARGE PLANNING
Agency/Facility Name: [facility]  Outcome: Update Infusion referral, HH Referral and Rx right-faxed to [facility].

## 2018-08-07 NOTE — CARE PLAN
Problem: Safety  Goal: Will remain free from falls    Intervention: Implement fall precautions   08/07/18 0042   OTHER   Environmental Precautions Treaded Slipper Socks on Patient;Bed in Low Position   Chair/Bed Strip Alarm Yes - Alarm On      08/07/18 0042   OTHER   Environmental Precautions Treaded Slipper Socks on Patient;Bed in Low Position   Bedrails Bedrails Closest to Bathroom Down   Chair/Bed Strip Alarm Yes - Alarm On

## 2018-08-07 NOTE — DISCHARGE PLANNING
Anticipated Discharge Disposition: Home with HH and Home infusion     Action: LSW placed call to [facility] regarding status of referral, LSW informed [facility] is awaiting information from Medicaid regarding coverage,     LSW placed call to HH regarding accceptance, LSW informed HH awaiting updated order which includes midline care     Barriers to Discharge: acceptance of [facility] and [facility]     Plan: Pt awaiting acceptance of infusion services and HH

## 2018-08-07 NOTE — DISCHARGE PLANNING
Agency/Facility Name: [facility]   Spoke To: IVONNE Conteh  Outcome: Relayed message received from Ara at [facility] . IVNONE to update this CCA with instruction on how we are to move forward.

## 2018-08-07 NOTE — PROGRESS NOTES
Changed dressing on pt's left breast, slight redness.  Pain and tenderness decreasing per pt.  Got pt up to bathroom, unsteady, shaky.  Pt sitting up in chair, call light within reach.

## 2018-08-07 NOTE — PROGRESS NOTES
Bedside report received from night shift RN, assumed pt care. Pt a&ox4, complains of 8/10 pain, medication given per MAR. Reviewed plan of care with pt. Chart and labs reviewed.  Bed in lowest position, call light within reach. Hourly rounding in place. Educated about calling for assistance.

## 2018-08-07 NOTE — FACE TO FACE
Face to Face Note  -  Durable Medical Equipment    Madhavi Stephens M.D. 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    HOME O2 Saturation Measurements:(Values must be present for Home Oxygen orders)         ,     ,         My Clinical findings support the need for the above equipment due to:  Abnormal Gait, Wound infection    Supporting Symptoms: needs home IV infusions until 8/12, PT home health for baseline FWW use.     ------------------------------------------------------------------------------------------------------------------    Face to Face Supporting Documentation - Home Health    The encounter with this patient was in whole or in part the primary reason for home health admission.    Date of encounter:   Patient:                    MRN:                       YOB: 2019  Kristin Balderrama  [id number]  1990     Home health to see patient for:  Skilled Nursing care for assessment, interventions & education, Physical Therapy evaluation and treatment and Occupational therapy evaluation and treatment    Skilled need for:  Medication Management IV infusion Rocephin via mid line until 8/12.    Skilled nursing interventions to include:  Home  IV infusion therapy    Homebound evidenced status by:  Need the aid of supportive devices such as crutches, canes, wheelchairs or walkers. Leaving home must require a considerable and taxing effort. There must exist a normal inability to leave the home.    Community Physician to provide follow up care: Torres Brody M.D.     Optional Interventions    Wound information & treatment:    Home Infusion Therapy orders:    Line/Drain/Airway:    I certify the face to face encounter for this home care referral meets the CMS requirements and the encounter/clinical assessment with the patient was, in whole, or in part, for the medical condition(s) listed above, which is the primary reason for home health care. Based on my clinical findings: the service(s) are medically necessary, support the need for home health care, and the homebound criteria are met.  I certify that this patient has had a face to face encounter by myself.  Madhavi Stephens M.D. - NPI: [id number]    *Debility, frailty and advanced age in the absence of an acute deterioration or exacerbation of a condition do not qualify a patient for home health.

## 2018-08-07 NOTE — DISCHARGE PLANNING
Anticipated Discharge Disposition: Home with OP infusion     Action: LSW placed call to infusion center regarding infusion order, LSW left message requesting call back     Barriers to Discharge: infusion appt     Plan: LSW awaiting call back

## 2018-08-07 NOTE — PROGRESS NOTES
Report received. Patient oriented x4. Pain level 3 out of 1 10 ice pack provided. Denies SOB. Fall risk interventions in place, bed in low position, and bed alarm on. Assessment completed.

## 2018-08-07 NOTE — DISCHARGE PLANNING
[facility] has received this referral. It is not being placed on hold pending OP Infusion notes placed by KRYSTIAN Gamboa. Per patient insurance she cannot have Home health along with OP services. Please advise if you would like to proceed with Home Health.    thanks

## 2018-08-07 NOTE — PROGRESS NOTES
[facility] Hospitalist Progress Note    Date of Service: 2018    Chief Complaint  27 y.o. female admitted 2018 with cellulitis of left breast after failed outpatient clindamycin theraoy    Interval Problem Update  Improved on ceftriaxone. Difficulty with finding suitable oral agent. Plan was to discharge but waiting for ID recommendation.     Consultants/Specialty  ID to assist with suitable agent on discharge due to numerous drug allergies.     Disposition  pending        Review of Systems   Constitutional: Negative for chills, fever and weight loss.   HENT: Negative for ear pain, hearing loss and tinnitus.    Eyes: Negative for blurred vision, double vision and photophobia.   Respiratory: Negative for cough, hemoptysis and sputum production.    Cardiovascular: Negative for chest pain, palpitations and orthopnea.   Gastrointestinal: Negative for heartburn, nausea and vomiting.   Genitourinary: Negative for dysuria, frequency and urgency.   Musculoskeletal: Negative for back pain, myalgias and neck pain.   Skin: Positive for rash (on the left breast).   Neurological: Negative for dizziness, tingling and headaches.      Physical Exam  Laboratory/Imaging   Hemodynamics  Temp (24hrs), Av.7 °C (98.1 °F), Min:36.4 °C (97.6 °F), Max:36.9 °C (98.4 °F)   Temperature: 36.7 °C (98 °F)  Pulse  Av.4  Min: 41  Max: 112    Blood Pressure: 134/83      Respiratory      Respiration: 16, Pulse Oximetry: 97 %        RUL Breath Sounds: Clear, RML Breath Sounds: Clear, RLL Breath Sounds: Clear;Diminished, MARY Breath Sounds: Clear, LLL Breath Sounds: Clear;Diminished    Fluids    Intake/Output Summary (Last 24 hours) at 18 1927  Last data filed at 18 1600   Gross per 24 hour   Intake              560 ml   Output              250 ml   Net              310 ml       Nutrition  Orders Placed This Encounter   Procedures   • Diet Order Diabetic     Standing Status:   Standing     Number of Occurrences:   1     Order  Specific Question:   Diet:     Answer:   Diabetic [3]     Order Specific Question:   Calorie modifications:     Answer:   1800 kcals [4]     Physical Exam   Constitutional: She is oriented to person, place, and time. She appears well-developed and well-nourished.   HENT:   Head: Normocephalic and atraumatic.   Eyes: Pupils are equal, round, and reactive to light. Conjunctivae are normal.   Neck: Normal range of motion. Neck supple.   Cardiovascular: Normal rate and regular rhythm.    Pulmonary/Chest: Effort normal and breath sounds normal.   Abdominal: Soft. Bowel sounds are normal.   Musculoskeletal: She exhibits no edema or tenderness.   Neurological: She is alert and oriented to person, place, and time.   Skin: Skin is warm. No erythema (minro erythem with skin abrasion on the lateral upper side of the left breast).   No obvious erythema of any significance today. The affected breast area now looks like a an uninfected wound.            Recent Labs      08/04/18   0450   WBC  5.4   RBC  4.70   HEMOGLOBIN  14.6   HEMATOCRIT  42.8   MCV  91.1   MCH  31.1   MCHC  34.1   RDW  41.4   PLATELETCT  165   MPV  11.1     Recent Labs      08/04/18   0450   SODIUM  140   POTASSIUM  4.2   CHLORIDE  107   CO2  22   GLUCOSE  101*   BUN  7*   CREATININE  0.74   CALCIUM  9.4                      Assessment/Plan     * Cellulitis of left breast   Assessment & Plan    Clinically improved on ceftriaxone. Due to numerous drug allergies, I am not comfortable sending her home on a similar class oral agent without ID evaluation. Consulted ID for appropriate medication.  ID recommended midline placement and continuation of Ceftriaxone for a total of 12 days, stop date 8/12.  Please evaluate for discharge in a.m. Once outpatient plans is established by ID.         Type 2 diabetes mellitus without complication, without long-term current use of insulin (HCC)- (present on admission)   Assessment & Plan    Continue SSI  hema1c 6.8  Januvia         Chronic pain syndrome- (present on admission)   Assessment & Plan    Resume outpatient medications, baclofen and Neurontin        Morbid obesity with BMI of 45.0-49.9, adult ([facility])- (present on admission)   Assessment & Plan    We will need ongoing weight loss encouragement, counseling        Psychosis, schizophrenia, simple (HCC)- (present on admission)   Assessment & Plan    Without active psychosis resume outpatient medications          Quality-Core Measures   Andrade catheter::  No Andrade

## 2018-08-07 NOTE — DISCHARGE PLANNING
Received Choice form at 12:10 pm  Agency/Facility Name: [facility]  Referral sent per Choice form @ 12:10 pm

## 2018-08-08 VITALS
WEIGHT: 272.05 LBS | RESPIRATION RATE: 18 BRPM | SYSTOLIC BLOOD PRESSURE: 140 MMHG | HEART RATE: 90 BPM | DIASTOLIC BLOOD PRESSURE: 85 MMHG | OXYGEN SATURATION: 95 % | TEMPERATURE: 97.2 F | HEIGHT: 65 IN | BODY MASS INDEX: 45.33 KG/M2

## 2018-08-08 PROBLEM — Z99.81 CHRONIC RESPIRATORY FAILURE WITH HYPOXIA, ON HOME OXYGEN THERAPY (HCC): Status: ACTIVE | Noted: 2018-08-08

## 2018-08-08 PROBLEM — R74.01 TRANSAMINITIS: Status: ACTIVE | Noted: 2018-08-08

## 2018-08-08 PROBLEM — J96.11 CHRONIC RESPIRATORY FAILURE WITH HYPOXIA, ON HOME OXYGEN THERAPY (HCC): Status: ACTIVE | Noted: 2018-08-08

## 2018-08-08 LAB
GLUCOSE BLD-MCNC: 122 MG/DL (ref 65–99)
GLUCOSE BLD-MCNC: 145 MG/DL (ref 65–99)

## 2018-08-08 PROCEDURE — 700105 HCHG RX REV CODE 258: Performed by: HOSPITALIST

## 2018-08-08 PROCEDURE — 665998 HH PPS REVENUE CREDIT

## 2018-08-08 PROCEDURE — 700102 HCHG RX REV CODE 250 W/ 637 OVERRIDE(OP): Performed by: HOSPITALIST

## 2018-08-08 PROCEDURE — 700111 HCHG RX REV CODE 636 W/ 250 OVERRIDE (IP): Performed by: FAMILY MEDICINE

## 2018-08-08 PROCEDURE — A9270 NON-COVERED ITEM OR SERVICE: HCPCS | Performed by: HOSPITALIST

## 2018-08-08 PROCEDURE — 99232 SBSQ HOSP IP/OBS MODERATE 35: CPT | Performed by: INTERNAL MEDICINE

## 2018-08-08 PROCEDURE — 700111 HCHG RX REV CODE 636 W/ 250 OVERRIDE (IP): Performed by: HOSPITALIST

## 2018-08-08 PROCEDURE — G8978 MOBILITY CURRENT STATUS: HCPCS | Mod: CI

## 2018-08-08 PROCEDURE — 82962 GLUCOSE BLOOD TEST: CPT

## 2018-08-08 PROCEDURE — 97161 PT EVAL LOW COMPLEX 20 MIN: CPT

## 2018-08-08 PROCEDURE — A9270 NON-COVERED ITEM OR SERVICE: HCPCS | Performed by: FAMILY MEDICINE

## 2018-08-08 PROCEDURE — G8980 MOBILITY D/C STATUS: HCPCS | Mod: CI

## 2018-08-08 PROCEDURE — 99239 HOSP IP/OBS DSCHRG MGMT >30: CPT | Performed by: HOSPITALIST

## 2018-08-08 PROCEDURE — 700102 HCHG RX REV CODE 250 W/ 637 OVERRIDE(OP): Performed by: FAMILY MEDICINE

## 2018-08-08 PROCEDURE — G8979 MOBILITY GOAL STATUS: HCPCS | Mod: CI

## 2018-08-08 PROCEDURE — 665999 HH PPS REVENUE DEBIT

## 2018-08-08 RX ORDER — BUSPIRONE HYDROCHLORIDE 10 MG/1
5 TABLET ORAL 2 TIMES DAILY
Qty: 60 TAB | Refills: 2 | Status: SHIPPED | OUTPATIENT
Start: 2018-08-08 | End: 2019-01-25

## 2018-08-08 RX ORDER — NICOTINE 14MG/24HR
1 PATCH, TRANSDERMAL 24 HOURS TRANSDERMAL 2 TIMES DAILY WITH MEALS
Qty: 60 CAP | Refills: 0 | Status: SHIPPED | OUTPATIENT
Start: 2018-08-08 | End: 2018-09-07

## 2018-08-08 RX ADMIN — FLUOXETINE 10 MG: 10 CAPSULE ORAL at 05:30

## 2018-08-08 RX ADMIN — HYDROCODONE BITARTRATE AND ACETAMINOPHEN 1 TABLET: 5; 325 TABLET ORAL at 07:55

## 2018-08-08 RX ADMIN — SITAGLIPTIN 100 MG: 100 TABLET, FILM COATED ORAL at 05:31

## 2018-08-08 RX ADMIN — ASPIRIN 81 MG: 81 TABLET, COATED ORAL at 05:30

## 2018-08-08 RX ADMIN — GABAPENTIN 600 MG: 300 CAPSULE ORAL at 05:30

## 2018-08-08 RX ADMIN — MUPIROCIN 1 APPLICATION: 20 OINTMENT TOPICAL at 05:31

## 2018-08-08 RX ADMIN — ZIPRASIDONE HCL 80 MG: 40 CAPSULE ORAL at 05:30

## 2018-08-08 RX ADMIN — HYDROCODONE BITARTRATE AND ACETAMINOPHEN 1 TABLET: 5; 325 TABLET ORAL at 00:24

## 2018-08-08 RX ADMIN — ACETAMINOPHEN 650 MG: 325 TABLET, FILM COATED ORAL at 03:08

## 2018-08-08 RX ADMIN — SODIUM BICARBONATE 650 MG: 650 TABLET ORAL at 05:30

## 2018-08-08 RX ADMIN — BACLOFEN 10 MG: 10 TABLET ORAL at 05:30

## 2018-08-08 RX ADMIN — BUSPIRONE HYDROCHLORIDE 10 MG: 10 TABLET ORAL at 05:30

## 2018-08-08 RX ADMIN — ACETAMINOPHEN 650 MG: 325 TABLET, FILM COATED ORAL at 12:04

## 2018-08-08 RX ADMIN — CEFTRIAXONE SODIUM 2 G: 2 INJECTION, POWDER, FOR SOLUTION INTRAMUSCULAR; INTRAVENOUS at 15:06

## 2018-08-08 RX ADMIN — ONDANSETRON 4 MG: 2 INJECTION INTRAMUSCULAR; INTRAVENOUS at 07:55

## 2018-08-08 ASSESSMENT — COGNITIVE AND FUNCTIONAL STATUS - GENERAL
MOBILITY SCORE: 21
WALKING IN HOSPITAL ROOM: A LITTLE
SUGGESTED CMS G CODE MODIFIER MOBILITY: CJ
CLIMB 3 TO 5 STEPS WITH RAILING: A LOT

## 2018-08-08 ASSESSMENT — ENCOUNTER SYMPTOMS
CHILLS: 0
COUGH: 0
ABDOMINAL PAIN: 0
DIARRHEA: 0
NAUSEA: 0
FEVER: 0
VOMITING: 0
SHORTNESS OF BREATH: 0

## 2018-08-08 ASSESSMENT — PAIN SCALES - GENERAL
PAINLEVEL_OUTOF10: 8
PAINLEVEL_OUTOF10: 8
PAINLEVEL_OUTOF10: 6
PAINLEVEL_OUTOF10: 8
PAINLEVEL_OUTOF10: 8

## 2018-08-08 ASSESSMENT — GAIT ASSESSMENTS
ASSISTIVE DEVICE: FRONT WHEEL WALKER
DISTANCE (FEET): 12
DEVIATION: ATAXIC;DECREASED BASE OF SUPPORT;OTHER (COMMENT)
GAIT LEVEL OF ASSIST: STAND BY ASSIST

## 2018-08-08 NOTE — CARE PLAN
Problem: Safety  Goal: Will remain free from falls    Intervention: Implement fall precautions   08/07/18 9587   OTHER   Environmental Precautions Treaded Slipper Socks on Patient;Report Given to Other Health Care Providers Regarding Fall Risk;Mobility Assessed & Appropriate Sign Placed;Personal Belongings, Wastebasket, Call Bell etc. in Easy Reach;Bed in Low Position;Communication Sign for Patients & Families;Transferred to [facility] Side   Bedrails Bedrails Closest to Bathroom Down   Chair/Bed Strip Alarm Yes - Alarm On         Problem: Pain Management  Goal: Pain level will decrease to patient's comfort goal  Prn pain medication provided

## 2018-08-08 NOTE — PROGRESS NOTES
Report received. Patient oriented x4. Pain level 8 out of 10 will give pain medication when due. Denies SOB. Fall risk interventions in place, bed in low position, and bed alarm on. Assessment completed.

## 2018-08-08 NOTE — DISCHARGE PLANNING
Anticipated Discharge Disposition: Home with HH and Home infusion      Action: LSW placed call to [facility] regarding status of referral, LSW left message request call back.     LSW placed call to HH regarding updated order being reviewed, LSW informed HH is able to accept once [facility] accepts Pt, LSW awaiting update from [facility].

## 2018-08-08 NOTE — THERAPY
"Physical Therapy Evaluation completed.   Bed Mobility:  Supine to Sit: Modified Independent  Transfers: Sit to Stand: Stand by Assist  Gait: Level Of Assist: Stand by Assist with Front-Wheel Walker       Plan of Care: Patient with no further skilled PT needs in the acute care setting at this time  Discharge Recommendations: Equipment: No Equipment Needed.   See \"Rehab Therapy-Acute\" Patient Summary Report for complete documentation.     Pt presented to PT with primary prevention/risk reduction for LOB/falling. Pt presented with imparied balance, imparied coordination, impaired gait, and dec activity tolerance. Current impairments and mobility appear to be near baseline. Pt was able to demonstrate SBA to Mod I for all functional mobility w/FWW use. Pt demonstrates with tremulous activity in BLE during ambulation and functional mobility, however, this is baseline per patient. Pt reports pacing herself when ambulating and normally ambulate household distances for transfers and bathroom use. Pt is in no need for acute skilled PT needs at this time as the pt appears to be at baseline at this time. Will recommend continued HH therapy services prior to d/c home with family support.   "
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
## 2018-08-08 NOTE — PROGRESS NOTES
[facility]ist Progress Note    Date of Service: 2018    Chief Complaint  27 y.o. female admitted 2018 with cellulitis of left breast after failed outpatient clindamycin theraoy    Interval Problem Update  Improved on ceftriaxone. Difficulty with finding suitable oral agent. Plan was to discharge but waiting for ID recommendation.   :  Small wound seen left breast ~1 cm diameter.  PICC line in place, no pain.    Consultants/Specialty  ID to assist with suitable agent on discharge due to numerous drug allergies.     Disposition  Ordered HH IV rocephin infusions, PICC line care, PT/OT, has FWW at home.  Lives with grandmother, has home O2 2-4 LPM NC .        Review of Systems   Constitutional: Negative for chills, fever and weight loss.   HENT: Negative for ear pain, hearing loss and tinnitus.    Eyes: Negative for blurred vision, double vision and photophobia.   Respiratory: Negative for cough, hemoptysis and sputum production.    Cardiovascular: Negative for chest pain, palpitations and orthopnea.   Gastrointestinal: Negative for heartburn, nausea and vomiting.   Genitourinary: Negative for dysuria, frequency and urgency.   Musculoskeletal: Negative for back pain, myalgias and neck pain.   Skin: Positive for rash (on the left breast).   Neurological: Negative for dizziness, tingling and headaches.      Physical Exam  Laboratory/Imaging   Hemodynamics  Temp (24hrs), Av.5 °C (97.7 °F), Min:36.2 °C (97.2 °F), Max:36.7 °C (98.1 °F)   Temperature: 36.7 °C (98 °F)  Pulse  Av.2  Min: 41  Max: 112    Blood Pressure: 116/69      Respiratory      Respiration: 18, Pulse Oximetry: 97 %        RUL Breath Sounds: Clear, RML Breath Sounds: Clear, RLL Breath Sounds: Diminished, MARY Breath Sounds: Clear, LLL Breath Sounds: Diminished    Fluids    Intake/Output Summary (Last 24 hours) at 18 8742  Last data filed at 18   Gross per 24 hour   Intake             1160 ml   Output               600 ml   Net              560 ml       Nutrition  Orders Placed This Encounter   Procedures   • Diet Order Diabetic     Standing Status:   Standing     Number of Occurrences:   1     Order Specific Question:   Diet:     Answer:   Diabetic [3]     Order Specific Question:   Calorie modifications:     Answer:   1800 kcals [4]     Physical Exam   Constitutional: She is oriented to person, place, and time. She appears well-developed and well-nourished.   HENT:   Head: Normocephalic and atraumatic.   Eyes: Pupils are equal, round, and reactive to light. Conjunctivae are normal.   Neck: Normal range of motion. Neck supple.   Cardiovascular: Normal rate and regular rhythm.    Pulmonary/Chest: Effort normal and breath sounds normal.   Abdominal: Soft. Bowel sounds are normal.   Musculoskeletal: She exhibits no edema or tenderness.   Neurological: She is alert and oriented to person, place, and time.   Skin: Skin is warm. No erythema (minro erythem with skin abrasion on the lateral upper side of the left breast).   No obvious erythema of any significance today. The affected breast area now looks like a an uninfected wound.                                     Assessment/Plan     * Cellulitis of left breast   Assessment & Plan    Clinically improved on ceftriaxone. Due to numerous drug allergies, I am not comfortable sending her home on a similar class oral agent without ID evaluation. Consulted ID for appropriate medication.  ID recommended midline placement and continuation of Ceftriaxone for a total of 12 days, stop date 8/12.  Please evaluate for discharge in a.m. Once outpatient plans is established by ID.         Type 2 diabetes mellitus without complication, without long-term current use of insulin (HCC)- (present on admission)   Assessment & Plan    Continue SSI  hema1c 6.8  Januvia        Chronic pain syndrome- (present on admission)   Assessment & Plan    Resume outpatient medications, baclofen and Neurontin         Morbid obesity with BMI of 45.0-49.9, adult ([facility])- (present on admission)   Assessment & Plan    We will need ongoing weight loss encouragement, counseling        Psychosis, schizophrenia, simple (HCC)- (present on admission)   Assessment & Plan    Without active psychosis resume outpatient medications          Quality-Core Measures   Andrade catheter::  No Andrade

## 2018-08-08 NOTE — PROGRESS NOTES
Pt. Given discharge instructions, evening IV antibiotic dose given, discussed diet, activity, follow up appointments, symptoms & management, and prescriptions sent to pharmacy, home medications returned to patient. Packet sent with patient, IV d/c'd, tele box off and all questions answered. Pt transported via wheelchair to [facility] with belongings.

## 2018-08-08 NOTE — CARE PLAN
Problem: Safety  Goal: Will remain free from falls    Intervention: Implement fall precautions   08/08/18 0947   OTHER   Environmental Precautions Treaded Slipper Socks on Patient;Personal Belongings, Wastebasket, Call Bell etc. in Easy Reach;Report Given to Other Health Care Providers Regarding Fall Risk;Bed in Low Position;Communication Sign for Patients & Families;Mobility Assessed & Appropriate Sign Placed         Problem: Pain Management  Goal: Pain level will decrease to patient's comfort goal    Intervention: Follow pain managment plan developed in collaboration with patient and Interdisciplinary Team  Pain 6/10 pt medicated per MAR

## 2018-08-08 NOTE — PROGRESS NOTES
Infectious Disease Progress Note    Author: Lou Calderón M.D. Date & Time of service: 2018  10:20 AM    Chief Complaint:  FU left breast cellulitis    Interval History:   AF having 3-4 watery BM since on IV abx with some abd cramping   AF diarrhea improved, no new issues to report, CM working on home IV abx as [facility] booked until next week  Labs Reviewed, Medications Reviewed, Radiology Reviewed and Wound Reviewed.    Review of Systems:  Review of Systems   Constitutional: Negative for chills and fever.   Respiratory: Negative for cough and shortness of breath.    Gastrointestinal: Negative for abdominal pain, diarrhea, nausea and vomiting.       Hemodynamics:  Temp (24hrs), Av.4 °C (97.6 °F), Min:36.2 °C (97.2 °F), Max:36.7 °C (98 °F)  Temperature: 36.2 °C (97.2 °F)  Pulse  Av.3  Min: 41  Max: 112   Blood Pressure: 140/85       Physical Exam:  Physical Exam   Constitutional: She is oriented to person, place, and time. She appears well-developed and well-nourished.   Obese   HENT:   Head: Normocephalic and atraumatic.   Eyes: Pupils are equal, round, and reactive to light. EOM are normal.   Neck: Neck supple.   Cardiovascular: Normal rate and normal heart sounds.    Pulmonary/Chest: Effort normal. She has no wheezes.   Left breast wound dressed.  Decreased erythema overall   Abdominal: Soft. There is no tenderness.   Musculoskeletal: Normal range of motion.   LUE midline nontender   Neurological: She is alert and oriented to person, place, and time.       Meds:    Current Facility-Administered Medications:   •  ivabradine  •  ondansetron  •  busPIRone  •  aspirin EC  •  baclofen  •  cholestyramine  •  FLUoxetine  •  HYDROcodone-acetaminophen  •  mupirocin  •  prednisoLONE acetate  •  SITagliptin  •  sodium bicarbonate  •  traZODone  •  ziprasidone  •  acetaminophen  •  insulin regular **AND** Accu-Chek ACHS **AND** NOTIFY MD and PharmD **AND** glucose 4 g **AND** dextrose 50%  •  cefTRIAXone  (ROCEPHIN) IV  •  gabapentin **AND** gabapentin    Labs:  No results for input(s): WBC, RBC, HEMOGLOBIN, HEMATOCRIT, MCV, MCH, RDW, PLATELETCT, MPV, NEUTSPOLYS, LYMPHOCYTES, MONOCYTES, EOSINOPHILS, BASOPHILS, RBCMORPHOLO in the last 72 hours.  No results for input(s): SODIUM, POTASSIUM, CHLORIDE, CO2, GLUCOSE, BUN, CPKTOTAL in the last 72 hours.  No results for input(s): ALBUMIN, TBILIRUBIN, ALKPHOSPHAT, TOTPROTEIN, ALTSGPT, ASTSGOT, CREATININE in the last 72 hours.    Imaging:  Ct-lspine W/o Plus Recons    Result Date: 7/14/2018 7/14/2018 4:52 AM HISTORY/REASON FOR EXAM: Atraumatic Pain/Radiculopathy TECHNIQUE/EXAM DESCRIPTION:  CT of the lumbar spine without contrast, with reconstructions. Transaxial scans of the lumbar spine without IV contrast. Sagittal reconstruction was performed. Low dose optimization technique was utilized for this CT exam including automated exposure control and adjustment of the mA and/or kV according to patient size. COMPARISON: None FINDINGS: There is normal alignment of the lumbar spine. No fracture or listhesis is identified. Vertebral body height is well maintained. There is anterior lumbar spine fusion seen at L4/L5. Facet arthrosis of L5/S1 is seen bilaterally, greater on the left resulting in mild bilateral neural foraminal narrowing. The prevertebral soft tissues and paraspinal soft tissues appear within normal limits.   The visualized thoracic and abdominal soft tissues appear within physiologic limits. There is 2.5 x 2.1 cm masslike structure identified at the tip of the appendix.     1.  No acute traumatic bony injury of the lumbar spine. 2.  Mild bilateral neural foraminal narrowing at L5/S1 due to facet arthrosis. 3.  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 Recommend surgical referral for further workup and management. These findings were discussed with the patient's clinician, LUANA NEGRON, on 7/14/2018 5:17  AM.    Ct-lspine With Plus Recons    Result Date: 7/14/2018 7/14/2018 3:31 PM HISTORY/REASON FOR EXAM: Low back pain. TECHNIQUE/EXAM DESCRIPTION AND NUMBER OF VIEWS: CT lumbar spine with contrast, with reconstructions. Thin-section axial helical images were obtained from T12 through S1 following the intrathecal administration of 12 mL of Omnipaque 180 nonionic contrast. Sagittal and coronal reconstructions were generated from the axial images. Low dose optimization technique was utilized for this CT exam including automated exposure control and adjustment of the mA and/or kV according to patient size. COMPARISON: Unenhanced CT examination of the lumbar spine FINDINGS: There is good opacification of the lumbar thecal sac following contrast injection via lumbar puncture. No lumbar vertebral body compression fracture or subluxation is present. Anterior fusion plate and screws and discectomy change is present at L4-5. T12-L1: No disc herniation or bony foraminal stenosis is present. The conus medullaris appears normal. L1-2: No disc herniation or bony foraminal stenosis. L2-3: No disc herniation or bony foraminal stenosis. L3-4: There is minimal diffuse annular bulging of the disc without significant effacement of the thecal sac. No bony foraminal stenosis. L4-5: No disc herniation or bony foraminal stenosis. L5-S1: No disc herniation or bony foraminal stenosis. There is moderate bilateral facet arthropathy at L5-S1. A single electrode courses through the upper sacrum to the presacral space.     1.  Good alignment of the lumbar spine with anterior spinal fusion hardware and disc graft material in place at L4-5. 2.  Minimal diffuse annular bulging of the L3-4 disc without effacement of thecal sac. 3.  Remainder of the lumbar disc spaces appear normal. No bony foraminal stenosis. 4.  Bilateral facet arthropathy L5-S1.    Dx-knee 3 Views Left    Result Date: 7/14/2018 7/14/2018 4:18 AM HISTORY/REASON FOR EXAM:  Pain/Deformity Following Trauma TECHNIQUE/EXAM DESCRIPTION:  AP, lateral, and oblique views of the LEFT knee. COMPARISON:  None. FINDINGS: The bony structures and articulations appear within normal limits without visualized fracture, subluxation, or dislocation.     1.  No acute traumatic bony injury.    Dx-lumbar Puncture For Ct Only    Result Date: 7/15/2018  7/14/2018 3:39 PM HISTORY/REASON FOR EXAM:  Atraumatic Pain. Severe back pain with numbness/paralysis to the lower extremities TECHNIQUE/EXAM DESCRIPTION AND NUMBER OF VIEWS: Fluoroscopic-guided lumbar puncture. 2 fluoroscopic images obtained. Fluoroscopy time: 42 seconds PROCEDURE: Informed consent was obtained. A timeout was taken. With the patient in prone position, utilizing fluoroscopic guidance, satisfactory site for access was identified at the L3-4 level. This region was prepared and draped in a sterile manner. Local anesthesia with 1% lidocaine was administered. A 22  gauge spinal needle was advanced into the subarachnoid space. After return of clear CSF, 12 cc of Omnipaque 180 (for intrathecal administration) was injected with fluoroscopic guidance. The patient tolerated the procedure well with no evidence of complication.     Successful fluoroscopic-guided lumbar puncture with intrathecal contrast administration for subsequent CT myelogram.    Ir-picc Line Placement    Result Date: 8/6/2018  HISTORY/REASON FOR EXAM:   PICC placement. TECHNIQUE/EXAM DESCRIPTION AND NUMBER OF VIEWS:   PICC line insertion with ultrasound guidance.  The procedure was performed using maximal sterile barrier technique including sterile gown, mask, cap, and donning of sterile gloves following appropriate hand hygiene and/or sterile scrub. Patient skin site was prepped with 2% Chlorhexidine solution. FINDINGS:  PICC line insertion with Ultrasound Guidance was performed by qualified nursing staff without the assistance of a Radiologist. PICC positioning appropriateness  "confirmed by 3CG technology; chest xray only needed in the instance 3CG unable to confirm placement.              Ultrasound-guided PICC placement performed by qualified nursing staff as above.       Micro:  Results     Procedure Component Value Units Date/Time    BLOOD CULTURE [[id number]] Collected:  08/02/18 1230    Order Status:  Completed Specimen:  Blood from Peripheral Updated:  08/07/18 1500     Significant Indicator NEG     Source BLD     Site PERIPHERAL     Blood Culture No growth after 5 days of incubation.    Narrative:       1 of 2 for Blood Culture x 2 sites order. Per Hospital  Policy: Only change Specimen Src: to \"Line\" if specified by  physician order.    BLOOD CULTURE [[id number]] Collected:  08/02/18 1256    Order Status:  Completed Specimen:  Blood from Peripheral Updated:  08/07/18 1500     Significant Indicator NEG     Source BLD     Site PERIPHERAL     Blood Culture No growth after 5 days of incubation.    Narrative:       2 of 2 blood culture x2  Sites order. Per Hospital Policy:  Only change Specimen Src: to \"Line\" if specified by physician  order.    C Diff by PCR rflx Toxin [[id number]] Collected:  08/07/18 1330    Order Status:  Canceled Specimen:  Stool from Stool     CULTURE WOUND W/ GRAM STAIN [[id number]] Collected:  08/03/18 0820    Order Status:  Completed Specimen:  Wound Updated:  08/05/18 1114     Gram Stain Result Few WBCs.  Rare Gram positive cocci.       Significant Indicator NEG     Source WND     Site Chest     Culture Result Wound Moderate growth mixed skin letty.    URINE CULTURE(NEW) [[id number]] Collected:  08/02/18 1535    Order Status:  Completed Specimen:  Urine Updated:  08/04/18 0802     Significant Indicator NEG     Source UR     Site --     Urine Culture Mixed skin letty >100,000 cfu/mL    Narrative:       Indication for culture:->Emergency Room Patient    GRAM STAIN [[id number]] Collected:  08/03/18 0820    Order Status:  Completed Specimen:  Wound Updated:  08/03/18 " 1657     Significant Indicator .     Source WND     Site Chest     Gram Stain Result Few WBCs.  Rare Gram positive cocci.      CULTURE WOUND W/ GRAM STAIN [[id number]]     Order Status:  No result Specimen:  Wound from Chest     URINALYSIS [[id number]]  (Abnormal) Collected:  08/02/18 9808    Order Status:  Completed Specimen:  Urine Updated:  08/02/18 1602     Color Yellow     Character Clear     Specific Gravity 1.023     Ph 8.5 (A)     Glucose Negative mg/dL      Ketones Negative mg/dL      Protein Negative mg/dL      Bilirubin Negative     Urobilinogen, Urine 1.0     Nitrite Negative     Leukocyte Esterase Small (A)     Occult Blood Negative     Micro Urine Req Microscopic    Narrative:       Indication for culture:->Emergency Room Patient          Assessment:  Active Hospital Problems    Diagnosis   • *Cellulitis of left breast [N61.0]   • Type 2 diabetes mellitus without complication, without long-term current use of insulin ([facility]) [E11.9]   • Morbid obesity with BMI of 45.0-49.9, adult ([facility]) [E66.01, Z68.42]   • Psychosis, schizophrenia, simple ([facility]) [F20.89]   • Chronic pain syndrome [G89.4]       Plan:  Cellulitis of left breast, improving  ? 2/2 spider bite  Wound cx - mixed skin letty  No fevers  No leukocytosis  Tolerating ceftriaxone  Plan for 10 days total  Stop date 08/12/18  [facility] orders done on 8/6  Avoiding linezolid as pt on multiple antidepressant meds  Multiple abx allergies    Diarrhea, improved  Check C diff if worsens    DM2  HgA1c 6.8   Maintain BS less than 150    Dispo: Home IV abx    OK to DC pt home once abx arranged    FU ID clinic    Discussed with internal medicine/Dr Stephens and Eliceo mgt

## 2018-08-08 NOTE — PROGRESS NOTES
Bedside report received. Pt care assumed. Pt up to edge of bed. Pt not in distress. Pt pain 6/10 and nausea medicated per MAR.  AOx 4. O2 on 2L. Safety precautions in place. Educated to call for assistance.

## 2018-08-08 NOTE — DISCHARGE PLANNING
Agency/Facility Name: [facility]  Outcome:[facility]fusion referral sent [facility] per choice form.    Agency/Facility Name: [facility]  Spoke To: Cheyanne  Outcome: Declined due to concerns with Allergies being a barrier, requiring full time care from a care giver, and unmonitored open access.

## 2018-08-09 ENCOUNTER — TELEPHONE (OUTPATIENT)
Dept: HEALTH INFORMATION MANAGEMENT | Facility: OTHER | Age: 28
End: 2018-08-09

## 2018-08-09 ENCOUNTER — HOME CARE VISIT (OUTPATIENT)
Dept: HOME HEALTH SERVICES | Facility: HOME HEALTHCARE | Age: 28
End: 2018-08-09
Payer: MEDICARE

## 2018-08-09 ENCOUNTER — PATIENT OUTREACH (OUTPATIENT)
Dept: HEALTH INFORMATION MANAGEMENT | Facility: OTHER | Age: 28
End: 2018-08-09

## 2018-08-09 VITALS
SYSTOLIC BLOOD PRESSURE: 120 MMHG | DIASTOLIC BLOOD PRESSURE: 68 MMHG | TEMPERATURE: 97.1 F | HEIGHT: 65 IN | BODY MASS INDEX: 45.38 KG/M2 | WEIGHT: 272.4 LBS | RESPIRATION RATE: 18 BRPM | HEART RATE: 88 BPM | OXYGEN SATURATION: 98 %

## 2018-08-09 PROCEDURE — 665998 HH PPS REVENUE CREDIT

## 2018-08-09 PROCEDURE — 665999 HH PPS REVENUE DEBIT

## 2018-08-09 PROCEDURE — G0493 RN CARE EA 15 MIN HH/HOSPICE: HCPCS

## 2018-08-09 SDOH — ECONOMIC STABILITY: HOUSING INSECURITY: UNSAFE COOKING RANGE AREA: 0

## 2018-08-09 SDOH — ECONOMIC STABILITY: HOUSING INSECURITY: HOME SAFETY: DERSTANDING.

## 2018-08-09 SDOH — ECONOMIC STABILITY: HOUSING INSECURITY

## 2018-08-09 SDOH — ECONOMIC STABILITY: HOUSING INSECURITY: UNSAFE APPLIANCES: 0

## 2018-08-09 ASSESSMENT — ENCOUNTER SYMPTOMS
DEPRESSED MOOD: 1
VOMITING: DENIES
POOR JUDGMENT: 1
SHORTNESS OF BREATH: T

## 2018-08-09 ASSESSMENT — ACTIVITIES OF DAILY LIVING (ADL)
OASIS_M1830: 03
HOME_HEALTH_OASIS: 01

## 2018-08-09 NOTE — TELEPHONE ENCOUNTER
Referral from [facility]. Med review completed. Interaction noted between ziprazodone and trazodone with potential to cause QTc prolongation. QTc interval was 494 on 8/4/17. Please advise.

## 2018-08-10 ENCOUNTER — TELEPHONE (OUTPATIENT)
Dept: INFECTIOUS DISEASES | Facility: MEDICAL CENTER | Age: 28
End: 2018-08-10

## 2018-08-10 ENCOUNTER — PATIENT OUTREACH (OUTPATIENT)
Dept: HEALTH INFORMATION MANAGEMENT | Facility: OTHER | Age: 28
End: 2018-08-10

## 2018-08-10 ENCOUNTER — HOME CARE VISIT (OUTPATIENT)
Dept: HOME HEALTH SERVICES | Facility: HOME HEALTHCARE | Age: 28
End: 2018-08-10
Payer: MEDICARE

## 2018-08-10 ENCOUNTER — APPOINTMENT (OUTPATIENT)
Dept: MEDICAL GROUP | Facility: MEDICAL CENTER | Age: 28
End: 2018-08-10
Payer: MEDICARE

## 2018-08-10 PROCEDURE — 665998 HH PPS REVENUE CREDIT

## 2018-08-10 PROCEDURE — 665999 HH PPS REVENUE DEBIT

## 2018-08-11 PROCEDURE — 665998 HH PPS REVENUE CREDIT

## 2018-08-11 PROCEDURE — 665999 HH PPS REVENUE DEBIT

## 2018-08-12 ENCOUNTER — HOME CARE VISIT (OUTPATIENT)
Dept: HOME HEALTH SERVICES | Facility: HOME HEALTHCARE | Age: 28
End: 2018-08-12
Payer: MEDICARE

## 2018-08-12 ENCOUNTER — HOSPITAL ENCOUNTER (EMERGENCY)
Facility: MEDICAL CENTER | Age: 28
End: 2018-08-12
Attending: EMERGENCY MEDICINE
Payer: MEDICARE

## 2018-08-12 VITALS
RESPIRATION RATE: 18 BRPM | TEMPERATURE: 97.2 F | SYSTOLIC BLOOD PRESSURE: 132 MMHG | HEART RATE: 74 BPM | WEIGHT: 270 LBS | OXYGEN SATURATION: 97 % | BODY MASS INDEX: 44.98 KG/M2 | DIASTOLIC BLOOD PRESSURE: 102 MMHG | HEIGHT: 65 IN

## 2018-08-12 DIAGNOSIS — B37.31 YEAST INFECTION INVOLVING THE VAGINA AND SURROUNDING AREA: ICD-10-CM

## 2018-08-12 PROCEDURE — 99283 EMERGENCY DEPT VISIT LOW MDM: CPT

## 2018-08-12 PROCEDURE — 665998 HH PPS REVENUE CREDIT

## 2018-08-12 PROCEDURE — 665999 HH PPS REVENUE DEBIT

## 2018-08-12 RX ORDER — NYSTATIN 100000 U/G
1 CREAM TOPICAL 2 TIMES DAILY
Qty: 1 TUBE | Refills: 3 | Status: SHIPPED | OUTPATIENT
Start: 2018-08-12 | End: 2019-03-19 | Stop reason: SDUPTHER

## 2018-08-12 RX ORDER — FLUCONAZOLE 100 MG/1
200 TABLET ORAL DAILY
Qty: 5 TAB | Refills: 0 | Status: SHIPPED | OUTPATIENT
Start: 2018-08-12 | End: 2018-08-16

## 2018-08-12 RX ORDER — FLUCONAZOLE 100 MG/1
200 TABLET ORAL DAILY
Qty: 5 TAB | Refills: 0 | Status: SHIPPED | OUTPATIENT
Start: 2018-08-12 | End: 2018-08-12

## 2018-08-12 RX ORDER — NYSTATIN 100000 U/G
1 CREAM TOPICAL 2 TIMES DAILY
Qty: 1 TUBE | Refills: 3 | Status: SHIPPED | OUTPATIENT
Start: 2018-08-12 | End: 2018-08-12

## 2018-08-12 ASSESSMENT — LIFESTYLE VARIABLES: DO YOU DRINK ALCOHOL: NO

## 2018-08-13 ENCOUNTER — HOME CARE VISIT (OUTPATIENT)
Dept: HOME HEALTH SERVICES | Facility: HOME HEALTHCARE | Age: 28
End: 2018-08-13
Payer: MEDICARE

## 2018-08-13 ENCOUNTER — PATIENT OUTREACH (OUTPATIENT)
Dept: HEALTH INFORMATION MANAGEMENT | Facility: OTHER | Age: 28
End: 2018-08-13

## 2018-08-13 ENCOUNTER — APPOINTMENT (OUTPATIENT)
Dept: RADIOLOGY | Facility: MEDICAL CENTER | Age: 28
End: 2018-08-13
Attending: EMERGENCY MEDICINE
Payer: MEDICARE

## 2018-08-13 ENCOUNTER — TELEPHONE (OUTPATIENT)
Dept: HEALTH INFORMATION MANAGEMENT | Facility: OTHER | Age: 28
End: 2018-08-13

## 2018-08-13 ENCOUNTER — OFFICE VISIT (OUTPATIENT)
Dept: INFECTIOUS DISEASES | Facility: MEDICAL CENTER | Age: 28
End: 2018-08-13
Payer: MEDICARE

## 2018-08-13 ENCOUNTER — HOSPITAL ENCOUNTER (EMERGENCY)
Facility: MEDICAL CENTER | Age: 28
End: 2018-08-14
Attending: EMERGENCY MEDICINE
Payer: MEDICARE

## 2018-08-13 VITALS
DIASTOLIC BLOOD PRESSURE: 50 MMHG | HEART RATE: 72 BPM | RESPIRATION RATE: 18 BRPM | OXYGEN SATURATION: 98 % | SYSTOLIC BLOOD PRESSURE: 118 MMHG | TEMPERATURE: 97.8 F

## 2018-08-13 VITALS
DIASTOLIC BLOOD PRESSURE: 70 MMHG | WEIGHT: 270 LBS | TEMPERATURE: 98.4 F | HEART RATE: 85 BPM | BODY MASS INDEX: 44.98 KG/M2 | OXYGEN SATURATION: 92 % | HEIGHT: 65 IN | SYSTOLIC BLOOD PRESSURE: 112 MMHG

## 2018-08-13 DIAGNOSIS — N61.0 CELLULITIS OF LEFT BREAST: ICD-10-CM

## 2018-08-13 DIAGNOSIS — E11.9 TYPE 2 DIABETES MELLITUS WITHOUT COMPLICATION, WITHOUT LONG-TERM CURRENT USE OF INSULIN (HCC): Chronic | ICD-10-CM

## 2018-08-13 DIAGNOSIS — T82.898A OCCLUSION OF PERIPHERALLY INSERTED CENTRAL CATHETER (PICC) LINE, INITIAL ENCOUNTER (HCC): ICD-10-CM

## 2018-08-13 DIAGNOSIS — F20.89 PSYCHOSIS, SCHIZOPHRENIA, SIMPLE (HCC): Chronic | ICD-10-CM

## 2018-08-13 LAB
ANION GAP SERPL CALC-SCNC: 10 MMOL/L (ref 0–11.9)
BASOPHILS # BLD AUTO: 0.4 % (ref 0–1.8)
BASOPHILS # BLD: 0.02 K/UL (ref 0–0.12)
BUN SERPL-MCNC: 8 MG/DL (ref 8–22)
CALCIUM SERPL-MCNC: 9 MG/DL (ref 8.5–10.5)
CHLORIDE SERPL-SCNC: 107 MMOL/L (ref 96–112)
CO2 SERPL-SCNC: 23 MMOL/L (ref 20–33)
CREAT SERPL-MCNC: 0.64 MG/DL (ref 0.5–1.4)
EKG IMPRESSION: NORMAL
EOSINOPHIL # BLD AUTO: 0.23 K/UL (ref 0–0.51)
EOSINOPHIL NFR BLD: 4.3 % (ref 0–6.9)
ERYTHROCYTE [DISTWIDTH] IN BLOOD BY AUTOMATED COUNT: 41.8 FL (ref 35.9–50)
GLUCOSE SERPL-MCNC: 136 MG/DL (ref 65–99)
HCT VFR BLD AUTO: 36.6 % (ref 37–47)
HGB BLD-MCNC: 12.5 G/DL (ref 12–16)
IMM GRANULOCYTES # BLD AUTO: 0.04 K/UL (ref 0–0.11)
IMM GRANULOCYTES NFR BLD AUTO: 0.8 % (ref 0–0.9)
LYMPHOCYTES # BLD AUTO: 1.96 K/UL (ref 1–4.8)
LYMPHOCYTES NFR BLD: 37 % (ref 22–41)
MCH RBC QN AUTO: 31.1 PG (ref 27–33)
MCHC RBC AUTO-ENTMCNC: 34.2 G/DL (ref 33.6–35)
MCV RBC AUTO: 91 FL (ref 81.4–97.8)
MONOCYTES # BLD AUTO: 0.35 K/UL (ref 0–0.85)
MONOCYTES NFR BLD AUTO: 6.6 % (ref 0–13.4)
NEUTROPHILS # BLD AUTO: 2.7 K/UL (ref 2–7.15)
NEUTROPHILS NFR BLD: 50.9 % (ref 44–72)
NRBC # BLD AUTO: 0 K/UL
NRBC BLD-RTO: 0 /100 WBC
PLATELET # BLD AUTO: 169 K/UL (ref 164–446)
PMV BLD AUTO: 11.9 FL (ref 9–12.9)
POTASSIUM SERPL-SCNC: 3.7 MMOL/L (ref 3.6–5.5)
RBC # BLD AUTO: 4.02 M/UL (ref 4.2–5.4)
SODIUM SERPL-SCNC: 140 MMOL/L (ref 135–145)
TROPONIN I SERPL-MCNC: <0.01 NG/ML (ref 0–0.04)
WBC # BLD AUTO: 5.3 K/UL (ref 4.8–10.8)

## 2018-08-13 PROCEDURE — 99284 EMERGENCY DEPT VISIT MOD MDM: CPT

## 2018-08-13 PROCEDURE — 80048 BASIC METABOLIC PNL TOTAL CA: CPT

## 2018-08-13 PROCEDURE — 71045 X-RAY EXAM CHEST 1 VIEW: CPT

## 2018-08-13 PROCEDURE — 99214 OFFICE O/P EST MOD 30 MIN: CPT | Performed by: INTERNAL MEDICINE

## 2018-08-13 PROCEDURE — 700111 HCHG RX REV CODE 636 W/ 250 OVERRIDE (IP)

## 2018-08-13 PROCEDURE — A6209 FOAM DRSG <=16 SQ IN W/O BDR: HCPCS

## 2018-08-13 PROCEDURE — 93005 ELECTROCARDIOGRAM TRACING: CPT | Performed by: EMERGENCY MEDICINE

## 2018-08-13 PROCEDURE — 700111 HCHG RX REV CODE 636 W/ 250 OVERRIDE (IP): Performed by: EMERGENCY MEDICINE

## 2018-08-13 PROCEDURE — 665998 HH PPS REVENUE CREDIT

## 2018-08-13 PROCEDURE — 665999 HH PPS REVENUE DEBIT

## 2018-08-13 PROCEDURE — 96372 THER/PROPH/DIAG INJ SC/IM: CPT

## 2018-08-13 PROCEDURE — 85025 COMPLETE CBC W/AUTO DIFF WBC: CPT

## 2018-08-13 PROCEDURE — G0299 HHS/HOSPICE OF RN EA 15 MIN: HCPCS

## 2018-08-13 PROCEDURE — 84484 ASSAY OF TROPONIN QUANT: CPT

## 2018-08-13 RX ORDER — HALOPERIDOL 5 MG/ML
2.5 INJECTION INTRAMUSCULAR ONCE
Status: DISCONTINUED | OUTPATIENT
Start: 2018-08-13 | End: 2018-08-13

## 2018-08-13 RX ORDER — LIDOCAINE HYDROCHLORIDE 10 MG/ML
20 INJECTION, SOLUTION INFILTRATION; PERINEURAL ONCE
Status: COMPLETED | OUTPATIENT
Start: 2018-08-14 | End: 2018-08-13

## 2018-08-13 RX ORDER — SODIUM CHLORIDE 9 MG/ML
1000 INJECTION, SOLUTION INTRAVENOUS ONCE
Status: DISCONTINUED | OUTPATIENT
Start: 2018-08-13 | End: 2018-08-13

## 2018-08-13 RX ORDER — LIDOCAINE HYDROCHLORIDE 10 MG/ML
INJECTION, SOLUTION EPIDURAL; INFILTRATION; INTRACAUDAL; PERINEURAL
Status: COMPLETED
Start: 2018-08-13 | End: 2018-08-13

## 2018-08-13 RX ORDER — DIPHENHYDRAMINE HYDROCHLORIDE 50 MG/ML
25 INJECTION INTRAMUSCULAR; INTRAVENOUS ONCE
Status: DISCONTINUED | OUTPATIENT
Start: 2018-08-13 | End: 2018-08-13

## 2018-08-13 RX ORDER — CEFTRIAXONE 2 G/1
1000 INJECTION, POWDER, FOR SOLUTION INTRAMUSCULAR; INTRAVENOUS ONCE
Status: COMPLETED | OUTPATIENT
Start: 2018-08-13 | End: 2018-08-13

## 2018-08-13 RX ADMIN — LIDOCAINE HYDROCHLORIDE 20 ML: 10 INJECTION, SOLUTION EPIDURAL; INFILTRATION; INTRACAUDAL; PERINEURAL at 23:34

## 2018-08-13 RX ADMIN — CEFTRIAXONE SODIUM 1 G: 2 INJECTION, POWDER, FOR SOLUTION INTRAMUSCULAR; INTRAVENOUS at 23:34

## 2018-08-13 RX ADMIN — LIDOCAINE HYDROCHLORIDE 20 ML: 10 INJECTION, SOLUTION INFILTRATION; PERINEURAL at 23:34

## 2018-08-13 ASSESSMENT — ENCOUNTER SYMPTOMS
RESPIRATORY SYMPTOMS COMMENTS: NO CONCERNS AT THE TIME OF THIS ASSESSMENT.
VOMITING: DENIES
DEPRESSED MOOD: 1

## 2018-08-13 ASSESSMENT — LIFESTYLE VARIABLES: DO YOU DRINK ALCOHOL: NO

## 2018-08-13 ASSESSMENT — PAIN SCALES - GENERAL: PAINLEVEL_OUTOF10: 9

## 2018-08-13 NOTE — DISCHARGE INSTRUCTIONS
Candidal Vulvovaginitis  Candidal vulvovaginitis is an infection of the vagina and vulva. The vulva is the skin around the opening of the vagina. This may cause itching and discomfort in and around the vagina.   HOME CARE  · Only take medicine as told by your doctor.  · Do not have sex (intercourse) until the infection is healed or as told by your doctor.  · Practice safe sex.  · Tell your sex partner about your infection.  · Do not douche or use tampons.  · Wear cotton underwear. Do not wear tight pants or panty hose.  · Eat yogurt. This may help treat and prevent yeast infections.  GET HELP RIGHT AWAY IF:   · You have a fever.  · Your problems get worse during treatment or do not get better in 3 days.  · You have discomfort, irritation, or itching in your vagina or vulva area.  · You have pain after sex.  · You start to get belly (abdominal) pain.  MAKE SURE YOU:  · Understand these instructions.  · Will watch your condition.  · Will get help right away if you are not doing well or get worse.  Document Released: 03/16/2010 Document Revised: 03/11/2013 Document Reviewed: 03/16/2010  [employer]® Patient Information ©2014 [employer].

## 2018-08-13 NOTE — ED NOTES
Kristin Balderrama discharged via wheelchair with mother.  Discharge instructions given and reviewed, patient educated to follow up with PCP, verbalized understanding.  Prescriptions given x 2.  All personal belongings in possession.  No questions at this time.

## 2018-08-13 NOTE — ED TRIAGE NOTES
Pt to triage c/o rash itching and red to groin and vaginal region. Pt states that she has been on Iv antibiotics for cellulitis and noted she has picc line in place. Pt states that she has been using nystatin cream without relief.

## 2018-08-13 NOTE — ED PROVIDER NOTES
ED Provider Note    Scribed for La Virk M.D. by Alfred Carter. 8/12/2018, 7:15 PM.    Primary care provider: Torres Brody M.D.  Means of arrival: Walk-in  History obtained from: Patient  History limited by: None    CHIEF COMPLAINT  Chief Complaint   Patient presents with   • Rash       HPI  Kristin Balderrama is a 27 y.o. female who presents to the Emergency Department for a rash. Patient's rash is localized to her groin and vaginal region and she describes severe pain to the area. She also believes that a previously diagnosed cellulitis rash on her left breast is worsening. Patient is regularly seen by Dr. Calderón, infectious disease, for treatment of the cellulitis and her last round of antibiotics ended today. She also had a PICC line placed in her left arm for continued treatment.     REVIEW OF SYSTEMS  Pertinent positives include vaginal rash, worsening cellulitis. E.    PAST MEDICAL HISTORY   has a past medical history of Abdominal pain; Anginal syndrome; Apnea, sleep; Arrhythmia; Arthritis; ASTHMA; Atrial fibrillation (HCC); Back pain; Borderline personality disorder; Breath shortness; Cardiac arrhythmia; Chickenpox; Chronic UTI (9/18/2010); Cough; Daytime sleepiness; Depression; Diabetes (HCC); Diarrhea; Disorder of thyroid; Fall; Fatigue; Frequent headaches; Gasping for breath; Gynecological disorder; Headache(784.0); Heart burn; History of falling; Hypertension; Migraine; 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HCC); Multiple personality disorder; Nausea; Obesity; Pain (08-15-12); Painful joint; PCOS (polycystic ovarian syndrome); Pneumonia (2012); Psychosis; Renal disorder; Ringing in ears; Scoliosis; Shortness of breath; Sinus tachycardia (10/31/2013); Sleep apnea; Snoring; Tonsillitis; Tuberculosis; Urinary bladder disorder; Urinary incontinence; Weakness; and Wears glasses.    SURGICAL HISTORY   has a past surgical history that includes neuro dest facet l/s w/ig sngl (4/21/2015); recovery (1/27/2016);  [place] by laparoscopy (8/29/2010); lumbar fusion anterior (8/21/2012); other cardiac surgery (1/2016); tonsillectomy; bowel stimulator insertion (7/13/2016); gastroscopy with balloon dilatation (N/A, 1/4/2017); muscle biopsy (Right, 1/26/2017); other abdominal surgery; and laminotomy.    SOCIAL HISTORY  Social History   Substance Use Topics   • Smoking status: Never Smoker   • Smokeless tobacco: Never Used   • Alcohol use No      History   Drug Use   • Types: Marijuana, Inhaled     Comment: edibles, MJ       FAMILY HISTORY  Family History   Problem Relation Age of Onset   • Hypertension Mother    • Sleep Apnea Mother    • Heart Disease Mother    • Other Mother         hypothryod   • Hypertension Maternal Uncle    • Heart Disease Maternal Grandmother    • Hypertension Maternal Grandmother    • No Known Problems Sister    • Other Sister         Narcolepsy;fibromyalsia;bone;nerve   • Genitourinary () Sister         endometriosis       CURRENT MEDICATIONS    Current Outpatient Prescriptions on File Prior to Encounter   Medication Sig Dispense Refill   • furosemide (LASIX) 40 MG Tab Take 40 mg by mouth every day.     • Lactobacillus (ACIDOPHILUS) 0.5 MG Tab Take 0.5 Tabs by mouth 2 Times a Day.     • cyanocobalamin ([employer] VITAMIN B-12) 500 MCG tablet Take 500 mcg by mouth every day.     • clindamycin (CLEOCIN T) 1 % Gel Apply 1 Application to affected area(s) 2 times a day.     • Sodium Chloride Flush (NORMAL SALINE FLUSH IV) 10 mL by Peripheral IV route every 24 hours.     • heparin lock flush 10 UNIT/ML Solution 10 Units by Intravenous route every 24 hours.     • busPIRone (BUSPAR) 10 MG Tab Take 0.5 Tabs by mouth 2 times a day. 60 Tab 2   • Saccharomyces boulardii (PROBIOTIC) 250 MG Cap Take 1 Cap by mouth 2 times a day, with meals for 30 days. 60 Cap 0   • baclofen (LIORESAL) 10 MG Tab Take 10 mg by mouth 2 times a day.     • gabapentin (NEURONTIN) 600 MG tablet Take 600-1,200 mg by mouth 2 times a day. 600 MG IN  "MORNING  1200 MG AT NIGHT      • liraglutide (VICTOZA) 18 MG/3ML Solution Pen-injector injection Inject 1.8 mg as instructed every day.     • ziprasidone (GEODON) 80 MG Cap Take 80 mg by mouth 2 Times a Day.     • cholestyramine (QUESTRAN) 4 g packet Take 1 Packet by mouth every day.     • Cholecalciferol (VITAMIN D3) 14737 units Cap Take 1 Each by mouth every 7 days. 4 Cap 5   • SITagliptin (JANUVIA) 100 MG Tab Take 1 Tab by mouth every day. 30 Tab 3   • promethazine (PHENERGAN) 25 MG Suppos Insert 1 Suppository in rectum every 6 hours as needed for Nausea/Vomiting. 10 Suppository 0   • traZODone (DESYREL) 100 MG Tab Take 1 Tab by mouth every bedtime. 90 Tab 3   • mupirocin (BACTROBAN) 2 % Ointment Apply BID to infected sores x 1week (Patient not taking: Reported on 8/9/2018) 30 g 0   • ivabradine (CORLANOR) 5 MG Tab tablet Take 1 Tab by mouth 2 times a day, with meals. 60 Tab 11   • sodium bicarbonate (SODIUM BICARBONATE) 650 MG Tab Take 650 mg by mouth 2 times a day.     • fluoxetine (PROZAC) 10 MG Cap TAKE ONE CAPSULE BY MOUTH ONCE A DAY 30 Cap 11   • albuterol 108 (90 Base) MCG/ACT Aero Soln inhalation aerosol Inhale 2 Puffs by mouth every 6 hours as needed for Shortness of Breath. 8.5 g 1   • aspirin EC (ECOTRIN) 81 MG Tablet Delayed Response Take 1 Tab by mouth every day. 30 Tab 6   • Melatonin 5 MG Tab Take 10 mg by mouth every bedtime (for sleep).        ALLERGIES  Allergies   Allergen Reactions   • Cefdinir Shortness of Breath and Itching     Tolerated 1/18/17  Tolerates ceftriaxone    • Depakote [Divalproex Sodium] Unspecified     Muscle spasms/muscle pain and weakness     • Doxycycline Anaphylaxis and Vomiting     RXN=unknown   • Abilify Unspecified     Headaches/muscle twitching     • Amitriptyline Unspecified     Headaches     • Amoxicillin Rash     Pt states \"I get a rash\".     • Ciprofloxacin Rash     Pt states \"I get a rash\".     • Clindamycin Nausea     Even with food     • Ees [Erythromycin] " "Vomiting and Nausea   • Flagyl [Metronidazole Hcl] Unspecified     \"eye problems\"     • Flomax [Tamsulosin Hydrochloride] Swelling   • Metformin Unspecified     Increased lactic acid      • Sulfa Drugs Hives and Rash     RXN=since childhood   • Tape Rash     Tears skin off   coban with Tegaderm tape ok  RXN=ongoing   • Vancomycin Itching     Pt becomes flushed in face and chest.   RXN=7/10/16   • Wound Dressing Adhesive Hives     By pt report   • Cephalexin [Keflex] Rash     Pt states she gets a rash with this medication  Tolerates ceftriaxone   • Erythromycin Rash     .   • Levofloxacin Unspecified     Leg muscle cramps   • Metronidazole Rash     .   • Valproic Acid Rash     .       PHYSICAL EXAM  VITAL SIGNS: /83   Pulse 87   Temp 37.3 °C (99.1 °F)   Resp 17   Ht 1.651 m (5' 5\")   Wt 122.5 kg (270 lb)   LMP  (LMP Unknown) Comment: 2-3 years ago  SpO2 98%   BMI 44.93 kg/m²     Constitutional:  Lying on bed speaking with grandmother, able to answer questions  HENT: Normocephalic, Bilateral external ears normal. Nose normal.   Eyes: Pupils are equal and reactive. Conjunctiva normal, non-icteric.   Cardiovascular:  Good Perfusion  Thorax & Lungs: Easy unlabored respirations  Abdomen:  No gross signs of peritonitis, no pain with movement   Skin: Flaking, erythematous, moist rash on bilateral inner thighs.  Extremities:   Moves all extremities. PICC line in place in left upper extremity    Neurologic: Alert, Grossly non-focal.   Psychiatric: Appropriate Mood    COURSE & MEDICAL DECISION MAKING  Nursing notes and vital signs were reviewed. (See chart for details)  The patient's history was obtained from the patient;     The patient presents with a vaginal rash, and the differential diagnosis includes but is not limited to vaginal yeast infection. Does not look like cellulitis and infection on arm appears to be healing. Infection extends into bilateral inner thighs, will treat her with fluconazole.   "     7:15 PM Informed patient that her rash is consistent with a fungal infection. Anti fungal cream and PO tablets will be effective for treatment. Patient is stable for discharge at this time. Discussed return precautions and follow up if symptoms do not improve. She agrees to the plan of care.    The patient was discharged home with an information sheet on candidal vulvovaginitis and told to return immediately for any signs or symptoms listed.  The patient agreed to the discharge precautions and follow-up plan which is documented in EPIC.     The patient will return for new or worsening symptoms and is stable at the time of discharge.    DISPOSITION:  Patient will be discharged home in stable condition.    FOLLOW UP:  Torres Brody M.D.  [address] [place] [id number] [phone]    Schedule an appointment as soon as possible for a visit in 2 days        OUTPATIENT MEDICATIONS:  New Prescriptions    FLUCONAZOLE (DIFLUCAN) 100 MG TAB    Take 2 Tabs by mouth every day for 5 days.    NYSTATIN (MYCOSTATIN) 586599 UNIT/GM CREAM TOPICAL CREAM    Apply 0.0001 g to affected area(s) 2 times a day.         FINAL IMPRESSION  1. Yeast infection involving the vagina and surrounding area          Alfred RODRIGUEZ (Eva), am scribing for, and in the presence of, La Virk M.D..    Electronically signed by: Alfred Espinosa), 8/12/2018    La RODRIGUEZ M.D. personally performed the services described in this documentation, as scribed by Alfred Carter in my presence, and it is both accurate and complete.    The note accurately reflects work and decisions made by me.  La Virk  8/12/2018  8:45 PM

## 2018-08-13 NOTE — PROGRESS NOTES
"Chief Complaint   Patient presents with   • Hospital Follow-up     left breast cellulitis       HISTORY OF PRESENT ILLNESS: Patient is a 27 y.o. female established patient who presents today for hosp f/u breast cellulitis  H/o obesity/psych disorders, DM  Cellulitis of left breast, improving  Wound cx - mixed skin letty  No fevers or leukocytosis in the hosp  Tolerating ceftriaxone (mult allergies/intolerances and concern for drug interaction with Zyvox)  Plan for 10 days total-Stop date 08/12/18  Attributes all her breast wounds to \"spiders\"    States last dose ceftriaxone yesterday and feels like infection is \"coming back\" Still has PICC  No fever or chills    Patient Active Problem List    Diagnosis Date Noted   • Left leg weakness 07/14/2018     Priority: High   • Type 2 diabetes mellitus without complication, without long-term current use of insulin ([facility]) 04/26/2017     Priority: High   • Sinus tachycardia 10/31/2013     Priority: High   • Chronic inflammatory arthritis 05/23/2016     Priority: Medium   • Morbid obesity with BMI of 45.0-49.9, adult ([facility]) 10/24/2017     Priority: Low   • Depression 10/28/2016     Priority: Low   • Schizophrenia ([facility]) 10/27/2016     Priority: Low   • Psychosis, schizophrenia, simple ([facility]) 09/29/2016     Priority: Low     Class: Chronic   • Acquired hypothyroidism 11/23/2015     Priority: Low   • PCOS (polycystic ovarian syndrome) 11/23/2015     Priority: Low   • Progressive focal motor weakness 06/28/2015     Priority: Low   • Fatty liver disease, nonalcoholic 01/19/2015     Priority: Low   • Anxiety 12/16/2014     Priority: Low   • Knee pain, right 02/13/2014     Priority: Low   • Neurogenic bladder 04/02/2011     Priority: Low   • Chronic UTI 09/18/2010     Priority: Low   • Borderline personality disorder in adult 09/18/2010     Priority: Low   • Chronic respiratory failure with hypoxia, on home oxygen therapy ([facility]) 08/08/2018   • Transaminitis 08/08/2018   • Cellulitis of " left breast 08/03/2018   • TONYA (obstructive sleep apnea) 01/09/2018   • Functional diarrhea 01/05/2018   • Breast wound 11/06/2017   • Intractable episodic cluster headache 09/14/2017   • Rash 06/09/2017   • Hashimoto's encephalopathy 05/17/2017   • Fall at home 05/13/2017   • On home oxygen therapy 04/15/2017   • Chest pain 03/30/2017   • Weakness of right upper extremity 02/23/2017   • Chronic suprapubic catheter (HCC) 02/16/2017   • Hypovitaminosis D 11/29/2016   • Chronic pain syndrome 10/27/2016   • Bowel and bladder incontinence 10/27/2016   • Galactorrhea 07/22/2016   • Elevated sedimentation rate 06/27/2016   • Weakness of both lower extremities 06/22/2016   • Vitamin D deficiency 05/21/2016   • Morbidly obese (HCC) 03/07/2016   • Scoliosis 03/07/2016   • GERD (gastroesophageal reflux disease) 03/07/2016   • Peripheral neuropathy ([facility]) 03/06/2016   • H/O prior ablation treatment 02/10/2016       Allergies:Cefdinir; Depakote [divalproex sodium]; Doxycycline; Abilify; Amitriptyline; Amoxicillin; Ciprofloxacin; Clindamycin; Ees [erythromycin]; Flagyl [metronidazole hcl]; Flomax [tamsulosin hydrochloride]; Metformin; Sulfa drugs; Tape; Vancomycin; Wound dressing adhesive; Cephalexin [keflex]; Erythromycin; Levofloxacin; Metronidazole; and Valproic acid    Current Outpatient Prescriptions   Medication Sig Dispense Refill   • fluconazole (DIFLUCAN) 100 MG Tab Take 2 Tabs by mouth every day for 5 days. 5 Tab 0   • nystatin (MYCOSTATIN) 700802 UNIT/GM Cream topical cream Apply 0.0001 g to affected area(s) 2 times a day. 1 Tube 3   • furosemide (LASIX) 40 MG Tab Take 40 mg by mouth every day.     • Lactobacillus (ACIDOPHILUS) 0.5 MG Tab Take 0.5 Tabs by mouth 2 Times a Day.     • cyanocobalamin ([employer] VITAMIN B-12) 500 MCG tablet Take 500 mcg by mouth every day.     • clindamycin (CLEOCIN T) 1 % Gel Apply 1 Application to affected area(s) 2 times a day.     • busPIRone (BUSPAR) 10 MG Tab Take 0.5 Tabs by mouth 2  times a day. 60 Tab 2   • Saccharomyces boulardii (PROBIOTIC) 250 MG Cap Take 1 Cap by mouth 2 times a day, with meals for 30 days. 60 Cap 0   • baclofen (LIORESAL) 10 MG Tab Take 10 mg by mouth 2 times a day.     • gabapentin (NEURONTIN) 600 MG tablet Take 600-1,200 mg by mouth 2 times a day. 600 MG IN MORNING  1200 MG AT NIGHT      • liraglutide (VICTOZA) 18 MG/3ML Solution Pen-injector injection Inject 1.8 mg as instructed every day.     • ziprasidone (GEODON) 80 MG Cap Take 80 mg by mouth 2 Times a Day.     • cholestyramine (QUESTRAN) 4 g packet Take 1 Packet by mouth every day.     • Cholecalciferol (VITAMIN D3) 88545 units Cap Take 1 Each by mouth every 7 days. 4 Cap 5   • SITagliptin (JANUVIA) 100 MG Tab Take 1 Tab by mouth every day. 30 Tab 3   • promethazine (PHENERGAN) 25 MG Suppos Insert 1 Suppository in rectum every 6 hours as needed for Nausea/Vomiting. 10 Suppository 0   • traZODone (DESYREL) 100 MG Tab Take 1 Tab by mouth every bedtime. 90 Tab 3   • ivabradine (CORLANOR) 5 MG Tab tablet Take 1 Tab by mouth 2 times a day, with meals. 60 Tab 11   • sodium bicarbonate (SODIUM BICARBONATE) 650 MG Tab Take 650 mg by mouth 2 times a day.     • fluoxetine (PROZAC) 10 MG Cap TAKE ONE CAPSULE BY MOUTH ONCE A DAY 30 Cap 11   • albuterol 108 (90 Base) MCG/ACT Aero Soln inhalation aerosol Inhale 2 Puffs by mouth every 6 hours as needed for Shortness of Breath. 8.5 g 1   • aspirin EC (ECOTRIN) 81 MG Tablet Delayed Response Take 1 Tab by mouth every day. 30 Tab 6   • Melatonin 5 MG Tab Take 10 mg by mouth every bedtime (for sleep).     • Sodium Chloride Flush (NORMAL SALINE FLUSH IV) 10 mL by Peripheral IV route every 24 hours.     • heparin lock flush 10 UNIT/ML Solution 10 Units by Intravenous route every 24 hours.     • mupirocin (BACTROBAN) 2 % Ointment Apply BID to infected sores x 1week (Patient not taking: Reported on 8/9/2018) 30 g 0     No current facility-administered medications for this visit.   "      Social History   Substance Use Topics   • Smoking status: Never Smoker   • Smokeless tobacco: Never Used   • Alcohol use No       Family History   Problem Relation Age of Onset   • Hypertension Mother    • Sleep Apnea Mother    • Heart Disease Mother    • Other Mother         hypothryod   • Hypertension Maternal Uncle    • Heart Disease Maternal Grandmother    • Hypertension Maternal Grandmother    • No Known Problems Sister    • Other Sister         Narcolepsy;fibromyalsia;bone;nerve   • Genitourinary () Sister         endometriosis       ROS:  Review of Systems   Constitutional: Negative for fever, chills, weight loss and malaise/fatigue.   All other systems reviewed and are negative except as in HPI.      Exam:  Blood pressure 112/70, pulse 85, temperature 36.9 °C (98.4 °F), height 1.651 m (5' 5\"), weight 122.5 kg (270 lb), SpO2 92 %.  General:  Well nourished, well developed female in NAD. Pleasant and cooperative Obese  Head: NCAT, Pupils are equal round reactive to light. Extraocular movements intact. Oropharynx is clear.  Neck: Supple.  No LAD  Pulmonary: Clear to ausculation.  No rales, rhonchi, or wheezing. Unlabored  Cardiovascular: Regular rate and rhythm   Abdominal: Soft, nontender, nondistended. Positive bowel sounds. No hepatosplenomegaly.  Skin: Multiple scars bilateral breast small erythematous papules scattered left breast lesion improved-not resolved  Extremities: no clubbing, cyanosis, or edema.  Neurologic: Alert and oriented x4. Speech is fluent without dysarthria. Cranial nerves intact. Moving all extremities. Gait within normal limits.      Assessment/Plan:  1. Cellulitis of left breast      Improved not resolved-local care discussed-as no viable oral options-extend Infusion one week   2. Type 2 diabetes mellitus without complication, without long-term current use of insulin (HCC)      Control BS to promote healing   3. Psychosis, schizophrenia, simple (HCC)      Drug interactions " preclude use of Zyvox        Ligia Rios M.D.

## 2018-08-14 VITALS
WEIGHT: 270 LBS | TEMPERATURE: 98.5 F | DIASTOLIC BLOOD PRESSURE: 59 MMHG | HEART RATE: 73 BPM | RESPIRATION RATE: 16 BRPM | OXYGEN SATURATION: 94 % | SYSTOLIC BLOOD PRESSURE: 116 MMHG | HEIGHT: 65 IN | BODY MASS INDEX: 44.98 KG/M2

## 2018-08-14 LAB — GLUCOSE BLD-MCNC: 117 MG/DL (ref 65–99)

## 2018-08-14 PROCEDURE — 82962 GLUCOSE BLOOD TEST: CPT

## 2018-08-14 PROCEDURE — 700101 HCHG RX REV CODE 250: Performed by: EMERGENCY MEDICINE

## 2018-08-14 PROCEDURE — 93971 EXTREMITY STUDY: CPT

## 2018-08-14 PROCEDURE — 665999 HH PPS REVENUE DEBIT

## 2018-08-14 PROCEDURE — 665998 HH PPS REVENUE CREDIT

## 2018-08-14 RX ORDER — LIDOCAINE HYDROCHLORIDE 20 MG/ML
3 INJECTION, SOLUTION INFILTRATION; PERINEURAL ONCE
Status: COMPLETED | OUTPATIENT
Start: 2018-08-14 | End: 2018-08-14

## 2018-08-14 RX ADMIN — LIDOCAINE HYDROCHLORIDE 3 ML: 20 INJECTION, SOLUTION INFILTRATION; PERINEURAL at 00:31

## 2018-08-14 NOTE — ED NOTES
Kristin Armstrong Balderrama discharged via wheelchair with mother.  Discharge instructions given and reviewed, patient educated to follow up with PCP, verbalized understanding.  Prescriptions given x 0.  All personal belongings in possession.  No questions at this time.

## 2018-08-14 NOTE — DISCHARGE INSTRUCTIONS
"Your laboratory tests were normal, and your ultrasound  PICC Home Guide  A peripherally inserted central catheter (PICC) is a long, thin, flexible tube that is inserted into a vein in the upper arm. It is a form of intravenous (IV) access. It is considered to be a \"central\" line because the tip of the PICC ends in a large vein in your chest. This large vein is called the superior vena cava (SVC). The PICC tip ends in the SVC because there is a lot of blood flow in the SVC. This allows medicines and IV fluids to be quickly distributed throughout the body. The PICC is inserted using a sterile technique by a specially trained nurse or physician. After the PICC is inserted, a chest X-ray exam is done to be sure it is in the correct place.   A PICC may be placed for different reasons, such as:  · To give medicines and liquid nutrition that can only be given through a central line. Examples are:  ¨ Certain antibiotic treatments.  ¨ Chemotherapy.  ¨ Total parenteral nutrition (TPN).  · To take frequent blood samples.  · To give IV fluids and blood products.  · If there is difficulty placing a peripheral intravenous (PIV) catheter.  If taken care of properly, a PICC can remain in place for several months. A PICC can also allow a person to go home from the hospital early. Medicine and PICC care can be managed at home by a family member or home health care team.  WHAT PROBLEMS CAN HAPPEN WHEN I HAVE A PICC?  Problems with a PICC can occasionally occur. These may include the following:  · A blood clot (thrombus) forming in or at the tip of the PICC. This can cause the PICC to become clogged. A clot-dissolving medicine called tissue plasminogen activator (tPA) can be given through the PICC to help break up the clot.  · Inflammation of the vein (phlebitis) in which the PICC is placed. Signs of inflammation may include redness, pain at the insertion site, red streaks, or being able to feel a \"cord\" in the vein where the PICC is " located.  · Infection in the PICC or at the insertion site. Signs of infection may include fever, chills, redness, swelling, or pus drainage from the PICC insertion site.  · PICC movement (malposition). The PICC tip may move from its original position due to excessive physical activity, forceful coughing, sneezing, or vomiting.  · A break or cut in the PICC. It is important to not use scissors near the PICC.  · Nerve or tendon irritation or injury during PICC insertion.  WHAT SHOULD I KEEP IN MIND ABOUT ACTIVITIES WHEN I HAVE A PICC?  · You may bend your arm and move it freely. If your PICC is near or at the bend of your elbow, avoid activity with repeated motion at the elbow.  · Rest at home for the remainder of the day following PICC line insertion.  · Avoid lifting heavy objects as instructed by your health care provider.  · Avoid using a crutch with the arm on the same side as your PICC. You may need to use a walker.  WHAT SHOULD I KNOW ABOUT MY PICC DRESSING?  · Keep your PICC bandage (dressing) clean and dry to prevent infection.  ¨ Ask your health care provider when you may shower. Ask your health care provider to teach you how to wrap the PICC when you do take a shower.  · Change the PICC dressing as instructed by your health care provider.  · Change your PICC dressing if it becomes loose or wet.  WHAT SHOULD I KNOW ABOUT PICC CARE?  · Check the PICC insertion site daily for leakage, redness, swelling, or pain.  · Do not take a bath, swim, or use hot tubs when you have a PICC. Cover PICC line with clear plastic wrap and tape to keep it dry while showering.  · Flush the PICC as directed by your health care provider. Let your health care provider know right away if the PICC is difficult to flush or does not flush. Do not use force to flush the PICC.  · Do not use a syringe that is less than 10 mL to flush the PICC.  · Never pull or tug on the PICC.  · Avoid blood pressure checks on the arm with the  "PICC.  · Keep your PICC identification card with you at all times.  · Do not take the PICC out yourself. Only a trained clinical professional should remove the PICC.  SEEK IMMEDIATE MEDICAL CARE IF:  · Your PICC is accidentally pulled all the way out. If this happens, cover the insertion site with a bandage or gauze dressing. Do not throw the PICC away. Your health care provider will need to inspect it.  · Your PICC was tugged or pulled and has partially come out. Do not  push the PICC back in.  · There is any type of drainage, redness, or swelling where the PICC enters the skin.  · You cannot flush the PICC, it is difficult to flush, or the PICC leaks around the insertion site when it is flushed.  · You hear a \"flushing\" sound when the PICC is flushed.  · You have pain, discomfort, or numbness in your arm, shoulder, or jaw on the same side as the PICC.  · You feel your heart \"racing\" or skipping beats.  · You notice a hole or tear in the PICC.  · You develop chills or a fever.  MAKE SURE YOU:   · Understand these instructions.  · Will watch your condition.  · Will get help right away if you are not doing well or get worse.     This information is not intended to replace advice given to you by your health care provider. Make sure you discuss any questions you have with your health care provider.     Document Released: 06/23/2004 Document Revised: 01/08/2016 Document Reviewed: 08/25/2014  Elsevier Interactive Patient Education ©2016 [employer]    "

## 2018-08-14 NOTE — ED PROVIDER NOTES
ED Provider Note    Scribed for Emery Piña M.D. by Emery Piña. 8/13/2018,  8:40 PM.    CHIEF COMPLAINT  Chief Complaint   Patient presents with   • Chest Pain     following attempt at administering home meds through PICC   • Vascular Access Problem     has been receiving home administered antibiotic regimen through PICC, unable to push meds today, arm swelled, PICC no longer functioning       HPI  Kristin Balderrama is a 27 y.o. female with an extensive history of medical and psychiatric illness who presents to the Emergency Department for pain at her PICC line site and the proximal upper arm/anterior shoulder at where she perceives the distal end of the PICC line to be.  She is getting 8 additional days of once daily ceftriaxone.  She has had no difficulty or discomfort reported the PICC line until today.  She noticed the discomfort with her last infusion.  The chest discomfort she is complaining of seems to be radiating from the arm and shoulder and was associated with an attempt to infuse her antibiotics.  At the bedside now, the PICC line draws and flushes well, the patient has pain with infusing into it.  It is not showing resistance.    REVIEW OF SYSTEMS  See HPI for further details. All other systems are negative.     PAST MEDICAL HISTORY   has a past medical history of Abdominal pain; Anginal syndrome; Apnea, sleep; Arrhythmia; Arthritis; ASTHMA; Atrial fibrillation ([facility]); Back pain; Borderline personality disorder; Breath shortness; Cardiac arrhythmia; Chickenpox; Chronic UTI (9/18/2010); Cough; Daytime sleepiness; Depression; Diabetes (HCC); Diarrhea; Disorder of thyroid; Fall; Fatigue; Frequent headaches; Gasping for breath; Gynecological disorder; Headache(784.0); Heart burn; History of falling; Hypertension; Migraine; 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HCC); Multiple personality disorder; Nausea; Obesity; Pain (08-15-12); Painful joint; PCOS (polycystic ovarian syndrome); Pneumonia (2012);  Psychosis; Renal disorder; Ringing in ears; Scoliosis; Shortness of breath; Sinus tachycardia (10/31/2013); Sleep apnea; Snoring; Tonsillitis; Tuberculosis; Urinary bladder disorder; Urinary incontinence; Weakness; and Wears glasses.    SOCIAL HISTORY  Social History     Social History Main Topics   • Smoking status: Never Smoker   • Smokeless tobacco: Never Used   • Alcohol use No   • Drug use: Yes     Types: Marijuana, Inhaled      Comment: ROJELIO brooks   • Sexual activity: Not Currently     Birth control/ protection: Pill     History   Drug Use   • Types: Marijuana, Inhaled     Comment: [email or web address]       SURGICAL HISTORY   has a past surgical history that includes neuro dest facet l/s w/ig sngl (4/21/2015); recovery (1/27/2016); [place] by laparoscopy (8/29/2010); lumbar fusion anterior (8/21/2012); other cardiac surgery (1/2016); tonsillectomy; bowel stimulator insertion (7/13/2016); gastroscopy with balloon dilatation (N/A, 1/4/2017); muscle biopsy (Right, 1/26/2017); other abdominal surgery; and laminotomy.    CURRENT MEDICATIONS  Home Medications     Reviewed by Glynn Baig R.N. (Registered Nurse) on 08/13/18 at 2029  Med List Status: Complete   Medication Last Dose Status   albuterol 108 (90 Base) MCG/ACT Aero Soln inhalation aerosol 8/13/2018 Active   aspirin EC (ECOTRIN) 81 MG Tablet Delayed Response 8/13/2018 Active   baclofen (LIORESAL) 10 MG Tab 8/13/2018 Active   busPIRone (BUSPAR) 10 MG Tab 8/13/2018 Active   Cholecalciferol (VITAMIN D3) 06320 units Cap 8/13/2018 Active   cholestyramine (QUESTRAN) 4 g packet 8/13/2018 Active   clindamycin (CLEOCIN T) 1 % Gel 8/13/2018 Active   cyanocobalamin ([facility] VITAMIN B-12) 500 MCG tablet 8/13/2018 Active   fluconazole (DIFLUCAN) 100 MG Tab 8/13/2018 Active   fluconazole (DIFLUCAN) 100 MG Tab  Active   fluoxetine (PROZAC) 10 MG Cap 8/13/2018 Active   furosemide (LASIX) 40 MG Tab 8/13/2018 Active   gabapentin (NEURONTIN) 600 MG tablet 8/13/2018 Active   heparin  "lock flush 10 UNIT/ML Solution  Active   ivabradine (CORLANOR) 5 MG Tab tablet 8/13/2018 Active   Lactobacillus (ACIDOPHILUS) 0.5 MG Tab 8/13/2018 Active   liraglutide (VICTOZA) 18 MG/3ML Solution Pen-injector injection 8/13/2018 Active   Melatonin 5 MG Tab 8/13/2018 Active   mupirocin (BACTROBAN) 2 % Ointment 8/2/2018 Active   nystatin (MYCOSTATIN) 457421 UNIT/GM Cream topical cream 8/13/2018 Active   NYSTATIN EX  Active   promethazine (PHENERGAN) 25 MG Suppos 8/13/2018 Active   Saccharomyces boulardii (PROBIOTIC) 250 MG Cap 8/13/2018 Active   SITagliptin (JANUVIA) 100 MG Tab 8/13/2018 Active   sodium bicarbonate (SODIUM BICARBONATE) 650 MG Tab 8/13/2018 Active   Sodium Chloride Flush (NORMAL SALINE FLUSH IV)  Active   traZODone (DESYREL) 100 MG Tab 8/13/2018 Active   ziprasidone (GEODON) 80 MG Cap 8/13/2018 Active                ALLERGIES  Allergies   Allergen Reactions   • Cefdinir Shortness of Breath and Itching     Tolerated 1/18/17  Tolerates ceftriaxone    • Depakote [Divalproex Sodium] Unspecified     Muscle spasms/muscle pain and weakness     • Doxycycline Anaphylaxis and Vomiting     RXN=unknown   • Abilify Unspecified     Headaches/muscle twitching     • Amitriptyline Unspecified     Headaches     • Amoxicillin Rash     Pt states \"I get a rash\".     • Ciprofloxacin Rash     Pt states \"I get a rash\".     • Clindamycin Nausea     Even with food     • Ees [Erythromycin] Vomiting and Nausea   • Flagyl [Metronidazole Hcl] Unspecified     \"eye problems\"     • Flomax [Tamsulosin Hydrochloride] Swelling   • Metformin Unspecified     Increased lactic acid      • Sulfa Drugs Hives and Rash     RXN=since childhood   • Tape Rash     Tears skin off   coban with Tegaderm tape ok  RXN=ongoing   • Vancomycin Itching     Pt becomes flushed in face and chest.   RXN=7/10/16   • Wound Dressing Adhesive Hives     By pt report   • Cephalexin [Keflex] Rash     Pt states she gets a rash with this medication  Tolerates " "ceftriaxone   • Erythromycin Rash     .   • Levofloxacin Unspecified     Leg muscle cramps   • Metronidazole Rash     .   • Valproic Acid Rash     .       PHYSICAL EXAM  VITAL SIGNS: /59   Pulse 73   Temp 36.9 °C (98.5 °F)   Resp 16   Ht 1.651 m (5' 5\")   Wt 122.5 kg (270 lb)   LMP  (LMP Unknown) Comment: 2-3 years ago  SpO2 94%   BMI 44.93 kg/m²   Pulse ox interpretation: I interpret this pulse ox as normal.  Constitutional: Alert in no apparent distress.  HENT: No signs of trauma, Bilateral external ears normal, Nose normal.   Eyes: Conjunctiva normal, Non-icteric.   Neck: Normal range of motion, Supple, No stridor.   Lymphatic: No lymphadenopathy noted.   Cardiovascular: Regular rate and rhythm, no murmurs.   Thorax & Lungs: Normal breath sounds, No respiratory distress, No wheezing, No chest tenderness.   Abdomen: Bowel sounds normal, Soft, No tenderness, No masses, No pulsatile masses. No peritoneal signs.  Skin: Warm, Dry, No erythema, No rash.   Back: No midline bony tenderness.   Extremities: Intact distal pulses, No edema, No cyanosis.  Musculoskeletal: Good range of motion in all major joints. No or major deformities noted.   Neurologic: Alert , Normal motor function, Normal sensory function, No focal deficits noted.   Psychiatric: Affect anxious, Judgment normal, Mood normal.     DIAGNOSTIC STUDIES / PROCEDURES    EKG  Interpreted by me    Rhythm:  Normal sinus rhythm   Rate: 73  BORDERLINE PROLONGED QT INTERVAL, improved from previous  Compared to ECG 08/04/2018 05:55:45  T-wave abnormality no longer present    LABS  Labs Reviewed   CBC WITH DIFFERENTIAL - Abnormal; Notable for the following:        Result Value    RBC 4.02 (*)     Hematocrit 36.6 (*)     All other components within normal limits   BASIC METABOLIC PANEL - Abnormal; Notable for the following:     Glucose 136 (*)     All other components within normal limits   TROPONIN   ESTIMATED GFR     All labs reviewed by " me.    RADIOLOGY  UE VENOUS DUPLEX (Specify in Comments Left, Right Or Bilateral)         DX-CHEST-LIMITED (1 VIEW)   Final Result      No evidence of acute cardiopulmonary process.        The radiologist's interpretation of all radiological studies have been reviewed by me.    COURSE & MEDICAL DECISION MAKING  Nursing notes, Dallin EID reviewed in chart.     8:40 PM Patient seen and examined at bedside. Differential diagnosis includes but is not limited to PICC line occlusion, upper extremity DVT, ACS.  Interestingly, her bedside nurse is not having any difficulty drawing off of her PICC line, or infusing into her PICC line.  However, this is causing the patient some significant pain, and she reports very clearly that although she has had no problem infusing her on antibiotics until today, it was very difficult to do so, and she had to push very hard, and ultimately was not able to get the antibiotics to go.  She may have had a small obstruction, perhaps a small occlusion in the line itself, that she cleared, and this may have caused some irritation.  Ordered for EKG and labs because the chest pain, and for evidence of infection, and an ultrasound of the left upper extremity to exclude a clot to evaluate.     12:47 AM the ultrasound technician reports no evidence of DVT or any abnormality to the patient's upper extremity.  I discussed this with the patient who is relieved, and we discussed that she may have cleared a small occlusion herself.  There is no indication for cath flow or any type of lysis.  However, the patient has had some pain related to using the PICC line, even while here, despite the fact that there is no resistance withdrawing or infusing.  We have skipped her PICC line dose of ceftriaxone, and administered it intramuscularly.  She can wait until tomorrow evening to do her next dose, and in hopes of relieving some of the discomfort in her PICC line, have infused 3 cc of 2% lidocaine into the PICC  line.  I let her know that this may along with some rest, relieve her symptoms, but if she is still having difficulty tomorrow, having redness, swelling, worsening pain, or any other issues, she should return for reevaluation.       The patient will return for new or worsening symptoms and is stable at the time of discharge.    The patient is referred to a primary physician for blood pressure management, diabetic screening, and for all other preventative health concerns.      DISPOSITION:  Patient will be discharged home in stable condition.    FOLLOW UP:  Torres Brody M.D.  [address] [place] [id number]  [phone]    Schedule an appointment as soon as possible for a visit        OUTPATIENT MEDICATIONS:  Discharge Medication List as of 8/14/2018 12:48 AM            FINAL IMPRESSION  1. Occlusion of peripherally inserted central catheter (PICC) line, initial encounter (HCC)

## 2018-08-14 NOTE — ED TRIAGE NOTES
"Chief Complaint   Patient presents with   • Chest Pain     following attempt at administering home meds through PICC   • Vascular Access Problem     has been receiving home administered antibiotic regimen through PICC, unable to push meds today, arm swelled, PICC no longer functioning     Pt to triage w/ family member in wheelchair for above. PICC is in LUE. Pt has PICC line in place from recent hospitalization, upon attempting home medication administration, Pt had sudden onset of arm pain and swelling near PICC insertion site, and subsequent onset of L upper chest pain. Breath sounds auscultated in all lung fields in triage, no abnormal heart sounds, Pt able to move arm, sensation intact. No erythema noted. Pt placed back in lobby at this time, charge RN notified.    Blood pressure 137/82, pulse 80, temperature 36.6 °C (97.8 °F), resp. rate 16, height 1.651 m (5' 5\"), weight 122.5 kg (270 lb), SpO2 96 %.      "

## 2018-08-15 ENCOUNTER — PATIENT OUTREACH (OUTPATIENT)
Dept: HEALTH INFORMATION MANAGEMENT | Facility: OTHER | Age: 28
End: 2018-08-15

## 2018-08-15 PROCEDURE — 665999 HH PPS REVENUE DEBIT

## 2018-08-15 PROCEDURE — 665998 HH PPS REVENUE CREDIT

## 2018-08-15 NOTE — PROGRESS NOTES
Chart reviewed.  Pt was discharged from [facility] ER 8/14/18.  Pt is currently on service with [facility] and does not qualify for discharge outreach phone call.

## 2018-08-16 ENCOUNTER — HOME CARE VISIT (OUTPATIENT)
Dept: HOME HEALTH SERVICES | Facility: HOME HEALTHCARE | Age: 28
End: 2018-08-16
Payer: MEDICARE

## 2018-08-16 ENCOUNTER — HOSPITAL ENCOUNTER (OUTPATIENT)
Facility: MEDICAL CENTER | Age: 28
End: 2018-08-16
Attending: INTERNAL MEDICINE
Payer: MEDICARE

## 2018-08-16 ENCOUNTER — OFFICE VISIT (OUTPATIENT)
Dept: MEDICAL GROUP | Facility: MEDICAL CENTER | Age: 28
End: 2018-08-16
Payer: MEDICARE

## 2018-08-16 VITALS
HEART RATE: 80 BPM | SYSTOLIC BLOOD PRESSURE: 115 MMHG | DIASTOLIC BLOOD PRESSURE: 68 MMHG | BODY MASS INDEX: 45.15 KG/M2 | TEMPERATURE: 99 F | HEIGHT: 65 IN | WEIGHT: 271 LBS | OXYGEN SATURATION: 95 %

## 2018-08-16 DIAGNOSIS — T14.8XXA WOUND INFECTION: ICD-10-CM

## 2018-08-16 DIAGNOSIS — R19.7 DIARRHEA, UNSPECIFIED TYPE: ICD-10-CM

## 2018-08-16 DIAGNOSIS — L08.9 WOUND INFECTION: ICD-10-CM

## 2018-08-16 DIAGNOSIS — B37.9 YEAST INFECTION: ICD-10-CM

## 2018-08-16 DIAGNOSIS — R11.0 NAUSEA: ICD-10-CM

## 2018-08-16 PROCEDURE — G0299 HHS/HOSPICE OF RN EA 15 MIN: HCPCS

## 2018-08-16 PROCEDURE — 87493 C DIFF AMPLIFIED PROBE: CPT

## 2018-08-16 PROCEDURE — 665998 HH PPS REVENUE CREDIT

## 2018-08-16 PROCEDURE — 665999 HH PPS REVENUE DEBIT

## 2018-08-16 PROCEDURE — 99214 OFFICE O/P EST MOD 30 MIN: CPT | Performed by: INTERNAL MEDICINE

## 2018-08-16 RX ORDER — FLUCONAZOLE 150 MG/1
150 TABLET ORAL DAILY
Qty: 2 TAB | Refills: 0 | Status: SHIPPED | OUTPATIENT
Start: 2018-08-16 | End: 2018-09-07

## 2018-08-16 NOTE — PROGRESS NOTES
CC: Follow-up hospitalization wound infection, yeast infection, nausea and diarrhea.    HPI:   Kristin presents today with the following.    1. Wound infection  Presents persistently on antibiotics through PICC line for a breast infection.  This is been a recurrent theme for her to get she has not grown any active bacteria.  She has 5 more days of antibiotics and follows up with infectious disease next week.    2. Yeast infection  While on antibiotic she is having persistent issues with yeast infection vaginally.  It also is affecting her pannus.  She is using topicals but it is .  Denies any fevers or chills and no spreading redness.    3. Nausea  She is having problems with nausea she is taking suppositories and oral medication.  Denies any true vomiting    4. Diarrhea, unspecified type  She is beginning to have some diarrhea as well.  No blood in her stool or dark tarry stool.  She is subject to diarrhea and again has been on antibiotics for some time now.      Patient Active Problem List    Diagnosis Date Noted   • Left leg weakness 07/14/2018     Priority: High   • Type 2 diabetes mellitus without complication, without long-term current use of insulin ([facility]) 04/26/2017     Priority: High   • Sinus tachycardia 10/31/2013     Priority: High   • Chronic inflammatory arthritis 05/23/2016     Priority: Medium   • Morbid obesity with BMI of 45.0-49.9, adult ([facility]) 10/24/2017     Priority: Low   • Depression 10/28/2016     Priority: Low   • Schizophrenia ([facility]) 10/27/2016     Priority: Low   • Psychosis, schizophrenia, simple ([facility]) 09/29/2016     Priority: Low     Class: Chronic   • Acquired hypothyroidism 11/23/2015     Priority: Low   • PCOS (polycystic ovarian syndrome) 11/23/2015     Priority: Low   • Progressive focal motor weakness 06/28/2015     Priority: Low   • Fatty liver disease, nonalcoholic 01/19/2015     Priority: Low   • Anxiety 12/16/2014     Priority: Low   • Knee pain, right 02/13/2014      Priority: Low   • Neurogenic bladder 04/02/2011     Priority: Low   • Chronic UTI 09/18/2010     Priority: Low   • Borderline personality disorder in adult 09/18/2010     Priority: Low   • Chronic respiratory failure with hypoxia, on home oxygen therapy ([facility]) 08/08/2018   • Transaminitis 08/08/2018   • Cellulitis of left breast 08/03/2018   • TONYA (obstructive sleep apnea) 01/09/2018   • Functional diarrhea 01/05/2018   • Breast wound 11/06/2017   • Intractable episodic cluster headache 09/14/2017   • Rash 06/09/2017   • Hashimoto's encephalopathy 05/17/2017   • Fall at home 05/13/2017   • On home oxygen therapy 04/15/2017   • Chest pain 03/30/2017   • Weakness of right upper extremity 02/23/2017   • Chronic suprapubic catheter ([facility]) 02/16/2017   • Hypovitaminosis D 11/29/2016   • Chronic pain syndrome 10/27/2016   • Bowel and bladder incontinence 10/27/2016   • Galactorrhea 07/22/2016   • Elevated sedimentation rate 06/27/2016   • Weakness of both lower extremities 06/22/2016   • Vitamin D deficiency 05/21/2016   • Morbidly obese ([facility]) 03/07/2016   • Scoliosis 03/07/2016   • GERD (gastroesophageal reflux disease) 03/07/2016   • Peripheral neuropathy ([facility]) 03/06/2016   • H/O prior ablation treatment 02/10/2016       Current Outpatient Prescriptions   Medication Sig Dispense Refill   • fluconazole (DIFLUCAN) 150 MG tablet Take 1 Tab by mouth every day. Take on day day one and day 3. 2 Tab 0   • NYSTATIN EX Apply 30 g to affected area(s) 2 Times a Day.     • nystatin (MYCOSTATIN) 606037 UNIT/GM Cream topical cream Apply 0.0001 g to affected area(s) 2 times a day. 1 Tube 3   • furosemide (LASIX) 40 MG Tab Take 40 mg by mouth every day.     • Lactobacillus (ACIDOPHILUS) 0.5 MG Tab Take 0.5 Tabs by mouth 2 Times a Day.     • cyanocobalamin ([employer] VITAMIN B-12) 500 MCG tablet Take 500 mcg by mouth every day.     • clindamycin (CLEOCIN T) 1 % Gel Apply 1 Application to affected area(s) 2 times a day.     • Sodium Chloride  Flush (NORMAL SALINE FLUSH IV) 10 mL by Peripheral IV route every 24 hours.     • heparin lock flush 10 UNIT/ML Solution 10 Units by Intravenous route every 24 hours.     • busPIRone (BUSPAR) 10 MG Tab Take 0.5 Tabs by mouth 2 times a day. 60 Tab 2   • Saccharomyces boulardii (PROBIOTIC) 250 MG Cap Take 1 Cap by mouth 2 times a day, with meals for 30 days. 60 Cap 0   • baclofen (LIORESAL) 10 MG Tab Take 10 mg by mouth 2 times a day.     • gabapentin (NEURONTIN) 600 MG tablet Take 600-1,200 mg by mouth 2 times a day. 600 MG IN MORNING  1200 MG AT NIGHT      • liraglutide (VICTOZA) 18 MG/3ML Solution Pen-injector injection Inject 1.8 mg as instructed every day.     • ziprasidone (GEODON) 80 MG Cap Take 80 mg by mouth 2 Times a Day.     • cholestyramine (QUESTRAN) 4 g packet Take 1 Packet by mouth every day.     • Cholecalciferol (VITAMIN D3) 04500 units Cap Take 1 Each by mouth every 7 days. 4 Cap 5   • promethazine (PHENERGAN) 25 MG Suppos Insert 1 Suppository in rectum every 6 hours as needed for Nausea/Vomiting. 10 Suppository 0   • traZODone (DESYREL) 100 MG Tab Take 1 Tab by mouth every bedtime. 90 Tab 3   • mupirocin (BACTROBAN) 2 % Ointment Apply BID to infected sores x 1week (Patient not taking: Reported on 8/9/2018) 30 g 0   • ivabradine (CORLANOR) 5 MG Tab tablet Take 1 Tab by mouth 2 times a day, with meals. 60 Tab 11   • sodium bicarbonate (SODIUM BICARBONATE) 650 MG Tab Take 650 mg by mouth 2 times a day.     • fluoxetine (PROZAC) 10 MG Cap TAKE ONE CAPSULE BY MOUTH ONCE A DAY 30 Cap 11   • albuterol 108 (90 Base) MCG/ACT Aero Soln inhalation aerosol Inhale 2 Puffs by mouth every 6 hours as needed for Shortness of Breath. 8.5 g 1   • aspirin EC (ECOTRIN) 81 MG Tablet Delayed Response Take 1 Tab by mouth every day. 30 Tab 6   • Melatonin 5 MG Tab Take 10 mg by mouth every bedtime (for sleep).       No current facility-administered medications for this visit.          Allergies as of 08/16/2018 - Reviewed  "08/13/2018   Allergen Reaction Noted   • Cefdinir Shortness of Breath and Itching 03/01/2016   • Depakote [divalproex sodium] Unspecified 06/14/2010   • Doxycycline Anaphylaxis and Vomiting 08/15/2012   • Abilify Unspecified 01/17/2013   • Amitriptyline Unspecified 10/31/2013   • Amoxicillin Rash 09/18/2010   • Ciprofloxacin Rash 12/17/2009   • Clindamycin Nausea 02/02/2011   • Ees [erythromycin] Vomiting and Nausea 08/28/2010   • Flagyl [metronidazole hcl] Unspecified 03/31/2011   • Flomax [tamsulosin hydrochloride] Swelling 09/24/2009   • Metformin Unspecified 07/23/2013   • Sulfa drugs Hives and Rash 09/18/2010   • Tape Rash 08/15/2012   • Vancomycin Itching 07/10/2016   • Wound dressing adhesive Hives 01/12/2018   • Cephalexin [keflex] Rash 01/01/2017   • Erythromycin Rash 03/30/2017   • Levofloxacin Unspecified 10/27/2016   • Metronidazole Rash 03/30/2017   • Valproic acid Rash 03/30/2017        ROS: Denies Chest pain, SOB, LE edema.    /68   Pulse 80   Temp 37.2 °C (99 °F)   Ht 1.651 m (5' 5\")   Wt 122.9 kg (271 lb)   LMP  (LMP Unknown) Comment: 2-3 years ago  SpO2 95%   BMI 45.10 kg/m²     Physical Exam:  Gen:         Alert and oriented, No apparent distress.  Neck:        No Lymphadenopathy or Bruits.  Lungs:     Clear to auscultation bilaterally  CV:          Regular rate and rhythm. No murmurs, rubs or gallops.               Ext:          No clubbing, cyanosis, edema.      Assessment and Plan.   27 y.o. female with the following issues.    1. Wound infection  Follow along with infectious disease    2. Yeast infection  Have given a prescription for Diflucan.  She will start the medication when she is only on antibiotics for 2 more days.  - fluconazole (DIFLUCAN) 150 MG tablet; Take 1 Tab by mouth every day. Take on day day one and day 3.  Dispense: 2 Tab; Refill: 0    3. Nausea  Continue current medications she does have medications at home    4. Diarrhea, unspecified type  Checking for C. " difficile.  - CDIFF BY PCR; Future

## 2018-08-16 NOTE — ED NOTES
Outpatient pharmacy called regarding prescriptions for nystatin cream and fluconazole. Clarified to dispense #10 of fluconazole and nystatin cream apply to affected area.    Milagro Thompson, MadihaD

## 2018-08-17 ENCOUNTER — APPOINTMENT (OUTPATIENT)
Dept: RADIOLOGY | Facility: MEDICAL CENTER | Age: 28
End: 2018-08-17
Attending: EMERGENCY MEDICINE
Payer: MEDICARE

## 2018-08-17 ENCOUNTER — HOME CARE VISIT (OUTPATIENT)
Dept: HOME HEALTH SERVICES | Facility: HOME HEALTHCARE | Age: 28
End: 2018-08-17
Payer: MEDICARE

## 2018-08-17 ENCOUNTER — HOSPITAL ENCOUNTER (EMERGENCY)
Facility: MEDICAL CENTER | Age: 28
End: 2018-08-17
Attending: EMERGENCY MEDICINE
Payer: MEDICARE

## 2018-08-17 VITALS
OXYGEN SATURATION: 95 % | HEART RATE: 72 BPM | DIASTOLIC BLOOD PRESSURE: 64 MMHG | SYSTOLIC BLOOD PRESSURE: 110 MMHG | TEMPERATURE: 97.2 F | RESPIRATION RATE: 18 BRPM | BODY MASS INDEX: 45.1 KG/M2 | HEIGHT: 65 IN

## 2018-08-17 VITALS
RESPIRATION RATE: 18 BRPM | DIASTOLIC BLOOD PRESSURE: 53 MMHG | TEMPERATURE: 97.8 F | WEIGHT: 271 LBS | HEIGHT: 65 IN | SYSTOLIC BLOOD PRESSURE: 134 MMHG | HEART RATE: 71 BPM | OXYGEN SATURATION: 98 % | BODY MASS INDEX: 45.15 KG/M2

## 2018-08-17 VITALS
OXYGEN SATURATION: 98 % | RESPIRATION RATE: 17 BRPM | SYSTOLIC BLOOD PRESSURE: 126 MMHG | DIASTOLIC BLOOD PRESSURE: 84 MMHG | TEMPERATURE: 98.9 F | HEART RATE: 82 BPM

## 2018-08-17 DIAGNOSIS — S40.011A CONTUSION OF RIGHT SHOULDER, INITIAL ENCOUNTER: ICD-10-CM

## 2018-08-17 DIAGNOSIS — R19.7 DIARRHEA, UNSPECIFIED TYPE: ICD-10-CM

## 2018-08-17 DIAGNOSIS — S80.12XA CONTUSION OF LEFT LOWER LEG, INITIAL ENCOUNTER: ICD-10-CM

## 2018-08-17 LAB
C DIFF DNA SPEC QL NAA+PROBE: NEGATIVE
C DIFF TOX GENS STL QL NAA+PROBE: NEGATIVE

## 2018-08-17 PROCEDURE — 73552 X-RAY EXAM OF FEMUR 2/>: CPT | Mod: LT

## 2018-08-17 PROCEDURE — G0299 HHS/HOSPICE OF RN EA 15 MIN: HCPCS

## 2018-08-17 PROCEDURE — A4456 ADHESIVE REMOVER, WIPES: HCPCS

## 2018-08-17 PROCEDURE — 73030 X-RAY EXAM OF SHOULDER: CPT | Mod: RT

## 2018-08-17 PROCEDURE — 700111 HCHG RX REV CODE 636 W/ 250 OVERRIDE (IP): Performed by: EMERGENCY MEDICINE

## 2018-08-17 PROCEDURE — 665999 HH PPS REVENUE DEBIT

## 2018-08-17 PROCEDURE — 700102 HCHG RX REV CODE 250 W/ 637 OVERRIDE(OP): Performed by: EMERGENCY MEDICINE

## 2018-08-17 PROCEDURE — 665998 HH PPS REVENUE CREDIT

## 2018-08-17 PROCEDURE — 99285 EMERGENCY DEPT VISIT HI MDM: CPT

## 2018-08-17 PROCEDURE — 96374 THER/PROPH/DIAG INJ IV PUSH: CPT

## 2018-08-17 PROCEDURE — 73590 X-RAY EXAM OF LOWER LEG: CPT | Mod: LT

## 2018-08-17 PROCEDURE — 72170 X-RAY EXAM OF PELVIS: CPT

## 2018-08-17 PROCEDURE — A5120 SKIN BARRIER, WIPE OR SWAB: HCPCS

## 2018-08-17 PROCEDURE — A9270 NON-COVERED ITEM OR SERVICE: HCPCS | Performed by: EMERGENCY MEDICINE

## 2018-08-17 RX ORDER — HYDROCODONE BITARTRATE AND ACETAMINOPHEN 5; 325 MG/1; MG/1
1 TABLET ORAL ONCE
Status: COMPLETED | OUTPATIENT
Start: 2018-08-17 | End: 2018-08-17

## 2018-08-17 RX ORDER — KETOROLAC TROMETHAMINE 30 MG/ML
15 INJECTION, SOLUTION INTRAMUSCULAR; INTRAVENOUS ONCE
Status: COMPLETED | OUTPATIENT
Start: 2018-08-17 | End: 2018-08-17

## 2018-08-17 RX ADMIN — KETOROLAC TROMETHAMINE 15 MG: 30 INJECTION, SOLUTION INTRAMUSCULAR at 07:30

## 2018-08-17 RX ADMIN — HYDROCODONE BITARTRATE AND ACETAMINOPHEN 1 TABLET: 5; 325 TABLET ORAL at 08:34

## 2018-08-17 SDOH — ECONOMIC STABILITY: HOUSING INSECURITY: UNSAFE APPLIANCES: 0

## 2018-08-17 SDOH — ECONOMIC STABILITY: HOUSING INSECURITY: UNSAFE COOKING RANGE AREA: 0

## 2018-08-17 SDOH — ECONOMIC STABILITY: HOUSING INSECURITY
HOME SAFETY: SN ENCOURAGED PT/CG TO REMOVE THROW RUGS, BUT PT STATES FLOOR IS TOO SLIPPERY" WITHOUT THEM. SN EDUCATED PT/CG TO PUT ON RUBBER SOLED SLIPPERS PRIOR TO AMBULATING. "

## 2018-08-17 ASSESSMENT — ENCOUNTER SYMPTOMS
RESPIRATORY SYMPTOMS COMMENTS: RESP. EVEN AND NONLABORED. NO DISTRESS.
POOR JUDGMENT: 1
VOMITING: DENIES
RESPIRATORY SYMPTOMS COMMENTS: NONE NOTED
DEPRESSED MOOD: 1

## 2018-08-17 ASSESSMENT — PAIN SCALES - GENERAL
PAINLEVEL_OUTOF10: 10
PAINLEVEL_OUTOF10: 0

## 2018-08-17 NOTE — ED NOTES
Pt placed in sling and ankle wrapped.  Attempted to placed ankle stir up and it was not wide enough to fit pt's leg.   Pt discharged, reviewed all discharge instructions including medications and follow up, pt verbalizes understanding, and denies questions.  Escorted to lobby.

## 2018-08-17 NOTE — ED NOTES
Pt assisted on bedpan. Pt had a moderate, soft BM. States she has been having intermittent episodes of n/v/d x 2 weeks since she started receiving abx through her PICC line. Pt w/ dx of breast cellulitis.

## 2018-08-17 NOTE — ED TRIAGE NOTES
Kristin Balderrama  27 y.o.  female  Chief Complaint   Patient presents with   • T-5000 GLF     Brought in by otonielsa from home. C/o right shoulder , left hip/ knee / foot pain s/p GLF. Patient stated that she was going to use the bathroom and fell. No deformity noted. Denies loc. Denies hitting head.

## 2018-08-17 NOTE — ED NOTES
Gavi fall assessment completed. Pt is High risk for fall. Interventions complete. Pt placed in yellow non slip socks, wrist band placed, green sign on door. Bed locked in low position, call light in place. Personal possessions in place.  Personal needs assessed. Safety assessed. Will monitor frequently

## 2018-08-17 NOTE — ED PROVIDER NOTES
"ED Provider Note    Scribed for Jim Sandhu D.O. by Aby Calles. 8/17/2018  6:51 AM    Primary care provider: Torres Brody M.D.  Means of arrival: Ambulance  History obtained from: Patient  History limited by: None    CHIEF COMPLAINT  •  Fall  •  Shoulder Pain    HPI   Kristin Balderrama is a 27 y.o. female who presents to the Emergency Department by ambulance for a fall and shoulder pain with an onset of today. Patient experienced a mechanical ground level fall at 3:00 AM this morning. She states she tripped on the carpet and fell onto her knees. She struck her right shoulder and is complaining of constant pain. Movement of her right shoulder and arm exacerbate her pain. She experienced a second ground level fall later this morning secondary to weakness. Associated symptoms include left hip pain, chronic back pain and one episode of bladder incontinence this morning. Negative for head injury, neck pain, chest pain or shortness of breath.      REVIEW OF SYSTEMS   Pertinent positives include mechanical ground level fall, ground level fall, right shoulder injury and pain, generalized weakness, left hip pain, chronic back pain and bladder incontinence. Pertinent negatives include no head injury, neck pain, chest pain or shortness of breath.  All other systems reviewed and negative. See HPI for further details. C.      PAST MEDICAL HISTORY  Past Medical History:   Diagnosis Date   • Abdominal pain    • Anginal syndrome     random chest pain especially with tachycardia   • Apnea, sleep    • Arrhythmia     \"sinus tachycardia\", cariologist, Dr. Kumar; ablation 2/2016   • Arthritis     osteo   • ASTHMA     when around smoke   • Atrial fibrillation (HCC)    • Back pain    • Borderline personality disorder    • Breath shortness     with tachycardia   • Cardiac arrhythmia    • Chickenpox    • Chronic UTI 9/18/2010   • Cough    • Daytime sleepiness    • Depression    • Diabetes (HCC)    • Diarrhea    • Disorder " "of thyroid    • Fall    • Fatigue    • Frequent headaches    • Gasping for breath    • Gynecological disorder     PCOS   • Headache(784.0)    • Heart burn    • History of falling    • Hypertension    • Migraine    • 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HCC)    • Multiple personality disorder    • Nausea    • Obesity    • Pain 08-15-12    back, 7/10   • Painful joint    • PCOS (polycystic ovarian syndrome)    • Pneumonia 2012   • Psychosis    • Renal disorder     \"kidney disease, stage 1\" nephrologist, Dr. 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   • Urinary bladder disorder     Suprapubic cath   • Urinary incontinence    • Weakness    • Wears glasses        SURGICAL HISTORY  Past Surgical History:   Procedure Laterality Date   • MUSCLE BIOPSY Right 1/26/2017    Procedure: MUSCLE BIOPSY - THIGH;  Surgeon: Isidro Vigil M.D.;  Location: SURGERY [facility];  Service:    • GASTROSCOPY WITH BALLOON DILATATION N/A 1/4/2017    Procedure: GASTROSCOPY WITH DILATATION;  Surgeon: Torres Vargas M.D.;  Location: [facility];  Service:    • BOWEL STIMULATOR INSERTION  7/13/2016    Procedure: BOWEL STIMULATOR INSERTION FOR PERMANENT INTERSTIM SACRAL IMPLANT;  Surgeon: Joe Noyola M.D.;  Location: [facility];  Service:    • RECOVERY  1/27/2016    Procedure: CATH LAB EP STUDY WITH SINUS NODE MODIFICATION ABHINAV;  Surgeon: Recoveryonly Surgery;  Location: SURGERY PRE-POST PROC UNIT [facility];  Service:    • OTHER CARDIAC SURGERY  1/2016    cardiac ablation   • NEURO DEST FACET L/S W/IG SNGL  4/21/2015    Performed by Reza Tabor at [facility]S   • LUMBAR FUSION ANTERIOR  8/21/2012    Performed by SUSIE SAWANT at [facility]S   • ALYSSA BY LAPAROSCOPY  8/29/2010    Performed " "by SHAYY JOHNS at SURGERY [facility]S   • LAMINOTOMY     • OTHER ABDOMINAL SURGERY     • TONSILLECTOMY      tonsillectomy        SOCIAL HISTORY  Social History   Substance Use Topics   • Smoking status: Never Smoker   • Smokeless tobacco: Never Used   • Alcohol use No      History   Drug Use   • Types: Marijuana, Inhaled     Comment: ROJELIO brooks   Patient lives with her 82 year old grandmother.      FAMILY HISTORY  Family History   Problem Relation Age of Onset   • Hypertension Mother    • Sleep Apnea Mother    • Heart Disease Mother    • Other Mother         hypothryod   • Hypertension Maternal Uncle    • Heart Disease Maternal Grandmother    • Hypertension Maternal Grandmother    • No Known Problems Sister    • Other Sister         Narcolepsy;fibromyalsia;bone;nerve   • Genitourinary () Sister         endometriosis       CURRENT MEDICATIONS  Home Medications    **Home medications have not yet been reviewed for this encounter**         ALLERGIES  Allergies   Allergen Reactions   • Cefdinir Shortness of Breath and Itching     Tolerated 1/18/17  Tolerates ceftriaxone    • Depakote [Divalproex Sodium] Unspecified     Muscle spasms/muscle pain and weakness     • Doxycycline Anaphylaxis and Vomiting     RXN=unknown   • Abilify Unspecified     Headaches/muscle twitching     • Amitriptyline Unspecified     Headaches     • Amoxicillin Rash     Pt states \"I get a rash\".     • Ciprofloxacin Rash     Pt states \"I get a rash\".     • Clindamycin Nausea     Even with food     • Ees [Erythromycin] Vomiting and Nausea   • Flagyl [Metronidazole Hcl] Unspecified     \"eye problems\"     • Flomax [Tamsulosin Hydrochloride] Swelling   • Metformin Unspecified     Increased lactic acid      • Sulfa Drugs Hives and Rash     RXN=since childhood   • Tape Rash     Tears skin off   coban with Tegaderm tape ok  RXN=ongoing   • Vancomycin Itching     Pt becomes flushed in face and chest.   RXN=7/10/16   • Wound Dressing Adhesive " "Hives     By pt report   • Cephalexin [Keflex] Rash     Pt states she gets a rash with this medication  Tolerates ceftriaxone   • Erythromycin Rash     .   • Levofloxacin Unspecified     Leg muscle cramps   • Metronidazole Rash     .   • Valproic Acid Rash     .       PHYSICAL EXAM  VITAL SIGNS: /53   Pulse 71   Temp 36.6 °C (97.8 °F)   Resp 18   Ht 1.651 m (5' 5\")   Wt 122.9 kg (271 lb)   LMP  (LMP Unknown) Comment: 2-3 years ago  SpO2 98%   BMI 45.10 kg/m²     Nursing notes and vitals reviewed.    Constitutional: Obese female Slight distress, Non-toxic appearance.   Eyes: PERRLA, EOMI, Conjunctiva normal, No discharge.   Cardiovascular: Normal heart rate, Normal rhythm, No murmurs, No rubs, No gallops.   Thorax & Lungs: No respiratory distress, No rales, No rhonchi, No wheezing, No chest tenderness.   Abdomen: Bowel sounds normal, Soft, No tenderness, No guarding, No rebound, No masses, No pulsatile masses.   Skin: Warm, Dry, No erythema, No rash.   Musculoskeletal: Intact distal pulses, No edema, No cyanosis, No clubbing. Good range of motion in all major joints. Left hip tenderness. Tenderness to left knee. Tenderness to left tib/fib and ankle. No clavicular tenderness. Left shoulder tenderness. No humeral tenderness. No major deformities noted, no CVA tenderness, no midline back tenderness.  No cervical thoracic lumbar tenderness.  Neurologic: Alert & oriented x 3, Normal motor function, Normal sensory function, No focal deficits noted. Strength intact to bilateral upper extremities. Slight decreased strength to left lower extremity.   Psychiatric anxious affect      DIAGNOSTIC STUDIES/PROCEDURES    RADIOLOGY  DX-TIBIA AND FIBULA LEFT   Final Result      No acute fracture identified.      DX-FEMUR-2+ LEFT   Final Result      No acute fracture identified.      DX-PELVIS-1 OR 2 VIEWS   Final Result      No acute fracture identified.      DX-SHOULDER 2+ RIGHT   Final Result      No acute fracture " "identified.        The radiologist's interpretation of all radiological studies have been reviewed by me.      COURSE & MEDICAL DECISION MAKING  Pertinent Labs & Imaging studies reviewed. (See chart for details)        7:01 AM Patient seen and examined at bedside. Patient presents for a fall and shoulder pain.     Initial orders in the Emergency Department included XR left tibia and fibula, XR left femur, XR pelvis and XR right shoulder.  Initial treatment in the Emergency Department included 15 mg of Toradol IV.  Social work consult.    8:24 AM Patient complains of persistent pain and will be treated with one tablet of 5-325 mg of Norco PO.    9:32 AM Nursing staff state the patient is complaining of decreased sensation to her right arm.    10:00 AM On repeat evaluation, XR results were reviewed with the patient. No evidence of fracture on XR. She may have an occult fracture and should follow up with Dr. Cantu, Orthopaedics, if she continues to experience significant pain. Nursing staff will apply a sling and ankle air splint. Tylenol and Motrin are recommended for pain. Rest, ice and elevation are recommended. The patient verbalizes understanding and will comply.  Patient is stable at the time of discharge.  Vital signs were reviewed: /53   Pulse 71   Temp 36.6 °C (97.8 °F)   Resp 18   Ht 1.651 m (5' 5\")   Wt 122.9 kg (271 lb)   LMP  (LMP Unknown) Comment: 2-3 years ago  SpO2 98%   BMI 45.10 kg/m²          Decision Making:  This is a 19-year-old female status post fall resulting in right shoulder contusion, left hip, femur, tib-fib contusion. X-rays are all negative. The patient was complaining of slight numbness to her right upper extremity but on my evaluation her ulnar, median and radial nerve intact sensation and strength she is able to move extremity at the shoulder as well as elbow and wrist without difficulty. She is placed in a sling for comfort. Initially she received a left air splint. "     Although at this point the patient does not have a fracture on x-ray there is a possibility the patient has sustained an occult fracture. For this reason, the patient is to followup with their primary care physician or orthopedist on call within one week if they continue to have pain or return sooner for increasing pain.      DISPOSITION  Patient will be discharged home in stable condition.      FOLLOW UP  [facility], Emergency Dept  [address] [id number] [phone]    If symptoms worsen    Jose Cantu M.D.  [address]  [address]  [facility] [address]  [phone]    Schedule an appointment as soon as possible for a visit in 1 week  If symptoms worsen      The patient is referred to a primary physician for blood pressure management, diabetic screening, and for all other preventative health concerns.      DIAGNOSIS  1. Contusion of right shoulder, initial encounter    2. Contusion of left lower leg, initial encounter           Aby RODRIGUEZ (Carolibe), am scribing for, and in the presence of, Jim Sandhu D.O.    Electronically signed by: Aby Calles (Scribe), 8/17/2018    IJim D.O. personally performed the services described in this documentation, as scribed by Aby Calles in my presence, and it is both accurate and complete.    The note accurately reflects work and decisions made by me.  Jim Sandhu  8/17/2018  2:46 PM

## 2018-08-17 NOTE — DISCHARGE INSTRUCTIONS
At this point you do not have evidence of a fracture on your x-ray yet you may have an occult fracture that was not seen. For this reason, followup with your primary care physician or orthopedist on call within one week for reevaluation if you continue to have significant pain or followup sooner for increasing symptoms. Place ice on your painful extremity 10 minutes at a time, 10 times a day for pain. Rest, elevate and use compression as needed.    Please follow up with a primary physician for blood pressure management, diabetic screening, and all other preventive health concerns.        Bone Bruise, Osteochondral Lesions,   Occult Trabecular Microfracture   A bone bruise is a small hidden fracture of the bone. It typically occurs with bones located close to the surface of the skin.   DIAGNOSIS  It can only be seen on special X-rays known as MRI's. This stands for Magnetic Resonance Imaging. A regular X-ray taken of a bone bruise would appear to be normal. A bone bruise is a common injury in the knee and the heel bone (calcaneus). The problems are similar to those produced by stress fractures, which are bone injuries caused by overuse. A bone bruise may also be a sign of other injuries. For example, bone bruises are commonly found where an anterior cruciate ligament (ACL) in the knee has been pulled away from the bone (ruptured). A ligament is a tough fibrous material that connects bones together to make our joints stable. Bruises of the bone last a lot longer than bruises of the muscle or tissues beneath the skin. Bone bruises can last from days to months and are often more severe and painful than other bruises.  SYMPTOMS  · The pain lasts longer than a normal bruise.   · The bruised area is difficult to use.   · There may be discoloration and/or swelling of the bruised area.   · When a bone bruise is found with injury to the anterior cruciate ligament (in the knee) there is often an increased:   · Amount of fluid  in the knee   · Time the fluid in the knee lasts.   · Number of days until you are walking normally and regaining the motion you had before the injury.   · Number of days with pain from the injury.   TREATMENTS  Because bone bruises are sudden injuries you cannot often prevent them, other than by being extremely careful. Some things you can do to improve the condition are:  · Apply ice to the sore area for 15 to 20 minutes 4 times per day while awake for the first 2 days. Put the ice in a plastic bag, and place a towel between the bag of ice and your skin.   · Keep your bruised area raised (elevated) when possible to lessen swelling.   · For Activity:   · Use crutches when necessary; do not put weight on the injured leg until you are no longer tender.   · You may walk on your affected part as the pain allows, or as instructed.   · Start weight bearing gradually on the bruised part.   · Continue to use crutches or a cane until you can stand without causing pain, or as instructed.   · If a plaster splint was applied, wear the splint until you are seen for a follow-up examination. Rest it on nothing harder than a pillow the first 24 hours. Do not put weight on it. Do not get it wet. You may take it off to take a shower or bath.   · If an air splint was applied, more air may be blown into or out of the splint as needed for comfort. You may take it off at night and to take a shower or bath.   · Wiggle your toes in the splint several times per day if you are able.   · You may have been given an elastic bandage to use with the plaster splint or alone. The splint is too tight if you have numbness, tingling or if your foot becomes cold and blue. Adjust the bandage to make it comfortable.   · Only take over-the-counter or prescription medicines for pain, discomfort, or fever as directed by your caregiver.   · Follow all instructions for follow-up with your caregiver. This includes any orthopedic referrals, physical therapy,  and rehabilitation. Any delay in obtaining necessary care could result in a delay or failure of the bones to heal.   SEEK MEDICAL CARE IF:  · You have an increase in bruising, swelling or pain.   · You notice coldness of your toes.   · You do not get pain relief with medications.   SEEK IMMEDIATE MEDICAL CARE IF:  · Your toes are numb or blue.   · You have severe pain not controlled with medications.   · If any of the problems that caused you to seek care are becoming worse.   Document Released: 01/06/2009 Document Re-Released: 01/09/2011  [employer]® Patient Information ©2011 [employer]

## 2018-08-17 NOTE — ED NOTES
Pt to the ED s/p multiple GLF's today. Pt was trying to go the bathroom when she tripped on the carpet and fell on her knees. Pt then stood, her knees became weak, and she fell, landing on her R shoulder, and then hitting her L hip, knee, and ankle. Pt is unsure whether she hit her head, but does not think she lost consciousness. 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 Pt normally uses walker to ambulate, but states she has frequent falls. Pt lives with her 82 yr old grandmother, who she says is the one who helps her out at home. States she feels like she may need more help at home. A/Ox4. Respirations even and unlabored.

## 2018-08-18 PROCEDURE — 665999 HH PPS REVENUE DEBIT

## 2018-08-18 PROCEDURE — 665998 HH PPS REVENUE CREDIT

## 2018-08-19 PROCEDURE — 665999 HH PPS REVENUE DEBIT

## 2018-08-19 PROCEDURE — 665998 HH PPS REVENUE CREDIT

## 2018-08-20 ENCOUNTER — HOME CARE VISIT (OUTPATIENT)
Dept: HOME HEALTH SERVICES | Facility: HOME HEALTHCARE | Age: 28
End: 2018-08-20
Payer: MEDICARE

## 2018-08-20 VITALS
HEART RATE: 73 BPM | DIASTOLIC BLOOD PRESSURE: 62 MMHG | OXYGEN SATURATION: 98 % | SYSTOLIC BLOOD PRESSURE: 118 MMHG | TEMPERATURE: 97.3 F | RESPIRATION RATE: 16 BRPM

## 2018-08-20 PROCEDURE — G0299 HHS/HOSPICE OF RN EA 15 MIN: HCPCS

## 2018-08-20 PROCEDURE — 665998 HH PPS REVENUE CREDIT

## 2018-08-20 PROCEDURE — 665999 HH PPS REVENUE DEBIT

## 2018-08-20 ASSESSMENT — ENCOUNTER SYMPTOMS
VOMITING: DENIES
DEPRESSED MOOD: 1
RESPIRATORY SYMPTOMS COMMENTS: NEW COUGH
NAUSEA: DENIES
DIFFICULTY THINKING: 1
THOUGHT CONTENT - SUSPICIOUS: 1

## 2018-08-21 ENCOUNTER — HOME CARE VISIT (OUTPATIENT)
Dept: HOME HEALTH SERVICES | Facility: HOME HEALTHCARE | Age: 28
End: 2018-08-21
Payer: MEDICARE

## 2018-08-21 PROCEDURE — 665998 HH PPS REVENUE CREDIT

## 2018-08-21 PROCEDURE — 665999 HH PPS REVENUE DEBIT

## 2018-08-22 ENCOUNTER — HOME CARE VISIT (OUTPATIENT)
Dept: HOME HEALTH SERVICES | Facility: HOME HEALTHCARE | Age: 28
End: 2018-08-22
Payer: MEDICARE

## 2018-08-22 ENCOUNTER — TELEPHONE (OUTPATIENT)
Dept: INFECTIOUS DISEASES | Facility: MEDICAL CENTER | Age: 28
End: 2018-08-22

## 2018-08-22 ENCOUNTER — PATIENT OUTREACH (OUTPATIENT)
Dept: HEALTH INFORMATION MANAGEMENT | Facility: OTHER | Age: 28
End: 2018-08-22

## 2018-08-22 PROCEDURE — 665998 HH PPS REVENUE CREDIT

## 2018-08-22 PROCEDURE — 665999 HH PPS REVENUE DEBIT

## 2018-08-22 NOTE — TELEPHONE ENCOUNTER
PT transferred to [facility] on 08/21/18. PT's PICC no longer in place nor on antibiotics currently for breast cellulitis. Spoke with Shawna HASSAN, they will contact our office if pt is in need of further evaluation by ID.   AMP

## 2018-08-23 ENCOUNTER — TELEPHONE (OUTPATIENT)
Dept: INFECTIOUS DISEASES | Facility: MEDICAL CENTER | Age: 28
End: 2018-08-23

## 2018-08-23 PROCEDURE — 665999 HH PPS REVENUE DEBIT

## 2018-08-23 PROCEDURE — 665998 HH PPS REVENUE CREDIT

## 2018-08-23 NOTE — TELEPHONE ENCOUNTER
Pt called stating that the wound is now closed; but the surrounding skin is becoming red, the redness is about dime sized and growing. Pt also states that there is a stabbing pain coming form the wound. Pt is currently admitted to [facility].  Please advise.  AMP

## 2018-08-24 PROCEDURE — 665999 HH PPS REVENUE DEBIT

## 2018-08-24 PROCEDURE — 665998 HH PPS REVENUE CREDIT

## 2018-08-25 PROCEDURE — 665998 HH PPS REVENUE CREDIT

## 2018-08-25 PROCEDURE — 665999 HH PPS REVENUE DEBIT

## 2018-08-26 PROCEDURE — 665999 HH PPS REVENUE DEBIT

## 2018-08-26 PROCEDURE — 665998 HH PPS REVENUE CREDIT

## 2018-08-27 PROCEDURE — 665998 HH PPS REVENUE CREDIT

## 2018-08-27 PROCEDURE — 665999 HH PPS REVENUE DEBIT

## 2018-08-28 PROCEDURE — 665998 HH PPS REVENUE CREDIT

## 2018-08-28 PROCEDURE — 665999 HH PPS REVENUE DEBIT

## 2018-08-29 PROCEDURE — 665999 HH PPS REVENUE DEBIT

## 2018-08-29 PROCEDURE — 665998 HH PPS REVENUE CREDIT

## 2018-08-30 PROCEDURE — 665999 HH PPS REVENUE DEBIT

## 2018-08-30 PROCEDURE — 665998 HH PPS REVENUE CREDIT

## 2018-08-31 PROCEDURE — 665999 HH PPS REVENUE DEBIT

## 2018-08-31 PROCEDURE — 665998 HH PPS REVENUE CREDIT

## 2018-09-01 PROCEDURE — 665999 HH PPS REVENUE DEBIT

## 2018-09-01 PROCEDURE — 665998 HH PPS REVENUE CREDIT

## 2018-09-02 PROCEDURE — 665999 HH PPS REVENUE DEBIT

## 2018-09-02 PROCEDURE — 665998 HH PPS REVENUE CREDIT

## 2018-09-03 PROCEDURE — 665999 HH PPS REVENUE DEBIT

## 2018-09-03 PROCEDURE — 665998 HH PPS REVENUE CREDIT

## 2018-09-04 ENCOUNTER — OFFICE VISIT (OUTPATIENT)
Dept: INFECTIOUS DISEASES | Facility: MEDICAL CENTER | Age: 28
End: 2018-09-04
Payer: MEDICARE

## 2018-09-04 VITALS
HEIGHT: 65 IN | DIASTOLIC BLOOD PRESSURE: 70 MMHG | SYSTOLIC BLOOD PRESSURE: 120 MMHG | BODY MASS INDEX: 41.65 KG/M2 | OXYGEN SATURATION: 95 % | TEMPERATURE: 97.7 F | WEIGHT: 250 LBS | HEART RATE: 80 BPM

## 2018-09-04 DIAGNOSIS — N61.0 CELLULITIS OF LEFT BREAST: Chronic | ICD-10-CM

## 2018-09-04 DIAGNOSIS — E11.9 TYPE 2 DIABETES MELLITUS WITHOUT COMPLICATION, WITHOUT LONG-TERM CURRENT USE OF INSULIN (HCC): Chronic | ICD-10-CM

## 2018-09-04 DIAGNOSIS — F20.89 PSYCHOSIS, SCHIZOPHRENIA, SIMPLE (HCC): Chronic | ICD-10-CM

## 2018-09-04 PROCEDURE — 665998 HH PPS REVENUE CREDIT

## 2018-09-04 PROCEDURE — 99214 OFFICE O/P EST MOD 30 MIN: CPT | Performed by: INTERNAL MEDICINE

## 2018-09-04 PROCEDURE — 665999 HH PPS REVENUE DEBIT

## 2018-09-04 RX ORDER — CEFTRIAXONE 1 G/1
1 INJECTION, POWDER, FOR SOLUTION INTRAMUSCULAR; INTRAVENOUS DAILY
Status: ON HOLD | COMMUNITY
Start: 2018-08-09 | End: 2018-09-12

## 2018-09-04 RX ORDER — PREGABALIN 75 MG/1
75 CAPSULE ORAL 3 TIMES DAILY
COMMUNITY
End: 2018-09-07

## 2018-09-04 NOTE — PROGRESS NOTES
"Chief Complaint   Patient presents with   • Follow-Up     Cellulitis of left breast       HISTORY OF PRESENT ILLNESS: Patient is a 27 y.o. female established patient who presents today for hosp f/u breast cellulitis  H/o obesity/psych disorders, DM, cellulitis of left breast  Wound cx - mixed skin letty  No fevers or leukocytosis in the hosp  Completed ceftriaxone (mult allergies/intolerances and concern for drug interaction with Zyvox) 08/12/18  Attributes all her breast wounds to \"spiders\"    States did well for awhile.  About 4 days ago was very weak, had eryhtema and lesions left breast, just felt \"horrible\"  Started on ceftriaxone IM with signif improvement in her sxs.  Erythema nearly resolved.  Appetite improved. Feels close to baseline    Patient Active Problem List    Diagnosis Date Noted   • Left leg weakness 07/14/2018     Priority: High   • Type 2 diabetes mellitus without complication, without long-term current use of insulin ([facility]) 04/26/2017     Priority: High   • Sinus tachycardia 10/31/2013     Priority: High   • Chronic inflammatory arthritis 05/23/2016     Priority: Medium   • Morbid obesity with BMI of 45.0-49.9, adult ([facility]) 10/24/2017     Priority: Low   • Depression 10/28/2016     Priority: Low   • Schizophrenia ([facility]) 10/27/2016     Priority: Low   • Psychosis, schizophrenia, simple ([facility]) 09/29/2016     Priority: Low     Class: Chronic   • Acquired hypothyroidism 11/23/2015     Priority: Low   • PCOS (polycystic ovarian syndrome) 11/23/2015     Priority: Low   • Progressive focal motor weakness 06/28/2015     Priority: Low   • Fatty liver disease, nonalcoholic 01/19/2015     Priority: Low   • Anxiety 12/16/2014     Priority: Low   • Knee pain, right 02/13/2014     Priority: Low   • Neurogenic bladder 04/02/2011     Priority: Low   • Chronic UTI 09/18/2010     Priority: Low   • Borderline personality disorder in adult 09/18/2010     Priority: Low   • Chronic respiratory failure with hypoxia, on " home oxygen therapy ([facility]) 08/08/2018   • Transaminitis 08/08/2018   • Cellulitis of left breast 08/03/2018   • TONYA (obstructive sleep apnea) 01/09/2018   • Functional diarrhea 01/05/2018   • Breast wound 11/06/2017   • Intractable episodic cluster headache 09/14/2017   • Rash 06/09/2017   • Hashimoto's encephalopathy 05/17/2017   • Fall at home 05/13/2017   • On home oxygen therapy 04/15/2017   • Chest pain 03/30/2017   • Weakness of right upper extremity 02/23/2017   • Chronic suprapubic catheter ([facility]) 02/16/2017   • Hypovitaminosis D 11/29/2016   • Chronic pain syndrome 10/27/2016   • Bowel and bladder incontinence 10/27/2016   • Galactorrhea 07/22/2016   • Elevated sedimentation rate 06/27/2016   • Weakness of both lower extremities 06/22/2016   • Vitamin D deficiency 05/21/2016   • Morbidly obese ([facility]) 03/07/2016   • Scoliosis 03/07/2016   • GERD (gastroesophageal reflux disease) 03/07/2016   • Peripheral neuropathy (CMS-HCC) 03/06/2016   • H/O prior ablation treatment 02/10/2016       Allergies:Cefdinir; Depakote [divalproex sodium]; Doxycycline; Abilify; Amitriptyline; Amoxicillin; Ciprofloxacin; Clindamycin; Ees [erythromycin]; Flagyl [metronidazole hcl]; Flomax [tamsulosin hydrochloride]; Metformin; Sulfa drugs; Tape; Vancomycin; Wound dressing adhesive; Cephalexin [keflex]; Erythromycin; Levofloxacin; Metronidazole; and Valproic acid    Current Outpatient Prescriptions   Medication Sig Dispense Refill   • pregabalin (LYRICA) 75 MG Cap Take 75 mg by mouth 3 times a day.     • cefTRIAXone (ROCEPHIN) 1 GM Recon Soln 1 g by Intramuscular route every day.     • fluconazole (DIFLUCAN) 150 MG tablet Take 1 Tab by mouth every day. Take on day day one and day 3. 2 Tab 0   • NYSTATIN EX Apply 30 g to affected area(s) 2 Times a Day.     • nystatin (MYCOSTATIN) 998859 UNIT/GM Cream topical cream Apply 0.0001 g to affected area(s) 2 times a day. 1 Tube 3   • furosemide (LASIX) 40 MG Tab Take 40 mg by mouth every day.      • Lactobacillus (ACIDOPHILUS) 0.5 MG Tab Take 0.5 Tabs by mouth 2 Times a Day.     • cyanocobalamin ([employer] VITAMIN B-12) 500 MCG tablet Take 500 mcg by mouth every day.     • clindamycin (CLEOCIN T) 1 % Gel Apply 1 Application to affected area(s) 2 times a day.     • busPIRone (BUSPAR) 10 MG Tab Take 0.5 Tabs by mouth 2 times a day. 60 Tab 2   • Saccharomyces boulardii (PROBIOTIC) 250 MG Cap Take 1 Cap by mouth 2 times a day, with meals for 30 days. 60 Cap 0   • baclofen (LIORESAL) 10 MG Tab Take 10 mg by mouth 2 times a day.     • liraglutide (VICTOZA) 18 MG/3ML Solution Pen-injector injection Inject 1.8 mg as instructed every day.     • ziprasidone (GEODON) 80 MG Cap Take 80 mg by mouth 2 Times a Day.     • cholestyramine (QUESTRAN) 4 g packet Take 1 Packet by mouth every day.     • Cholecalciferol (VITAMIN D3) 54078 units Cap Take 1 Each by mouth every 7 days. 4 Cap 5   • promethazine (PHENERGAN) 25 MG Suppos Insert 1 Suppository in rectum every 6 hours as needed for Nausea/Vomiting. 10 Suppository 0   • traZODone (DESYREL) 100 MG Tab Take 1 Tab by mouth every bedtime. 90 Tab 3   • mupirocin (BACTROBAN) 2 % Ointment Apply BID to infected sores x 1week 30 g 0   • ivabradine (CORLANOR) 5 MG Tab tablet Take 1 Tab by mouth 2 times a day, with meals. 60 Tab 11   • sodium bicarbonate (SODIUM BICARBONATE) 650 MG Tab Take 650 mg by mouth 2 times a day.     • fluoxetine (PROZAC) 10 MG Cap TAKE ONE CAPSULE BY MOUTH ONCE A DAY 30 Cap 11   • albuterol 108 (90 Base) MCG/ACT Aero Soln inhalation aerosol Inhale 2 Puffs by mouth every 6 hours as needed for Shortness of Breath. 8.5 g 1   • aspirin EC (ECOTRIN) 81 MG Tablet Delayed Response Take 1 Tab by mouth every day. 30 Tab 6   • Melatonin 5 MG Tab Take 10 mg by mouth every bedtime (for sleep).     • Sodium Chloride Flush (NORMAL SALINE FLUSH IV) 10 mL by Peripheral IV route every 24 hours.     • heparin lock flush 10 UNIT/ML Solution 10 Units by Intravenous route every 24  "hours.     • gabapentin (NEURONTIN) 600 MG tablet Take 600-1,200 mg by mouth 2 times a day. 600 MG IN MORNING  1200 MG AT NIGHT        No current facility-administered medications for this visit.        Social History   Substance Use Topics   • Smoking status: Never Smoker   • Smokeless tobacco: Never Used   • Alcohol use No       Family History   Problem Relation Age of Onset   • Hypertension Mother    • Sleep Apnea Mother    • Heart Disease Mother    • Other Mother         hypothryod   • Hypertension Maternal Uncle    • Heart Disease Maternal Grandmother    • Hypertension Maternal Grandmother    • No Known Problems Sister    • Other Sister         Narcolepsy;fibromyalsia;bone;nerve   • Genitourinary () Sister         endometriosis       ROS:  Review of Systems   Constitutional: Negative for fever, chills, weight loss and malaise/fatigue.   All other systems reviewed and are negative except as in HPI.      Exam:  Blood pressure 120/70, pulse 80, temperature 36.5 °C (97.7 °F), height 1.651 m (5' 5\"), weight 113.4 kg (250 lb), SpO2 95 %.  General:  Well nourished, well developed female in NAD. Pleasant and cooperative Obese  Head: NCAT, Pupils are equal round reactive to light. Extraocular movements intact. Oropharynx is clear.  Neck: Supple.  No LAD  Pulmonary: Clear to ausculation.  No rales, rhonchi, or wheezing. Unlabored  Cardiovascular: Regular rate and rhythm   Abdominal: Soft, nontender, nondistended. Positive bowel sounds. No hepatosplenomegaly.  Skin: Multiple scars bilateral breasts. Left breast lesions improving, shallow ulcerations-not resolved  Extremities: no clubbing, cyanosis, or edema. Brace RLE  Neurologic: Alert and oriented x4. Speech is fluent without dysarthria. Cranial nerves intact. Moving all extremities. Using WC.       Assessment/Plan:  1. Cellulitis of left breast      Cellulitis nearly resolved-still has multiple shallow open wounds left breast.  Denies scratching.  Continue IM " ceftriaxone for 10-14 day total   2. Type 2 diabetes mellitus without complication, without long-term current use of insulin (HCC)      Control BS to improve rate of healing   3. Psychosis, schizophrenia, simple (HCC)      Contributing to above        Ligia Rios M.D.

## 2018-09-05 PROCEDURE — 665998 HH PPS REVENUE CREDIT

## 2018-09-05 PROCEDURE — 665999 HH PPS REVENUE DEBIT

## 2018-09-06 PROCEDURE — 665998 HH PPS REVENUE CREDIT

## 2018-09-06 PROCEDURE — 665999 HH PPS REVENUE DEBIT

## 2018-09-07 ENCOUNTER — HOSPITAL ENCOUNTER (INPATIENT)
Facility: MEDICAL CENTER | Age: 28
LOS: 4 days | DRG: 123 | End: 2018-09-12
Attending: EMERGENCY MEDICINE | Admitting: INTERNAL MEDICINE
Payer: MEDICARE

## 2018-09-07 ENCOUNTER — APPOINTMENT (OUTPATIENT)
Dept: RADIOLOGY | Facility: MEDICAL CENTER | Age: 28
DRG: 123 | End: 2018-09-07
Attending: EMERGENCY MEDICINE
Payer: MEDICARE

## 2018-09-07 DIAGNOSIS — H57.11 PAIN OF RIGHT EYE: ICD-10-CM

## 2018-09-07 LAB
ALBUMIN SERPL BCP-MCNC: 4.4 G/DL (ref 3.2–4.9)
ALBUMIN/GLOB SERPL: 1.9 G/DL
ALP SERPL-CCNC: 58 U/L (ref 30–99)
ALT SERPL-CCNC: 42 U/L (ref 2–50)
ANION GAP SERPL CALC-SCNC: 11 MMOL/L (ref 0–11.9)
APPEARANCE UR: CLEAR
AST SERPL-CCNC: 20 U/L (ref 12–45)
BACTERIA #/AREA URNS HPF: ABNORMAL /HPF
BASOPHILS # BLD AUTO: 0.6 % (ref 0–1.8)
BASOPHILS # BLD: 0.05 K/UL (ref 0–0.12)
BILIRUB SERPL-MCNC: 0.6 MG/DL (ref 0.1–1.5)
BILIRUB UR QL STRIP.AUTO: NEGATIVE
BUN SERPL-MCNC: 11 MG/DL (ref 8–22)
CALCIUM SERPL-MCNC: 9.6 MG/DL (ref 8.5–10.5)
CHLORIDE SERPL-SCNC: 106 MMOL/L (ref 96–112)
CO2 SERPL-SCNC: 22 MMOL/L (ref 20–33)
COLOR UR: YELLOW
CREAT SERPL-MCNC: 0.71 MG/DL (ref 0.5–1.4)
CRP SERPL HS-MCNC: 0.75 MG/DL (ref 0–0.75)
EOSINOPHIL # BLD AUTO: 0.36 K/UL (ref 0–0.51)
EOSINOPHIL NFR BLD: 4.4 % (ref 0–6.9)
EPI CELLS #/AREA URNS HPF: ABNORMAL /HPF
ERYTHROCYTE [DISTWIDTH] IN BLOOD BY AUTOMATED COUNT: 42.5 FL (ref 35.9–50)
ERYTHROCYTE [SEDIMENTATION RATE] IN BLOOD BY WESTERGREN METHOD: 16 MM/HOUR (ref 0–20)
GLOBULIN SER CALC-MCNC: 2.3 G/DL (ref 1.9–3.5)
GLUCOSE SERPL-MCNC: 84 MG/DL (ref 65–99)
GLUCOSE UR STRIP.AUTO-MCNC: NEGATIVE MG/DL
HCG SERPL QL: NEGATIVE
HCT VFR BLD AUTO: 40.4 % (ref 37–47)
HGB BLD-MCNC: 13.5 G/DL (ref 12–16)
IMM GRANULOCYTES # BLD AUTO: 0.02 K/UL (ref 0–0.11)
IMM GRANULOCYTES NFR BLD AUTO: 0.2 % (ref 0–0.9)
KETONES UR STRIP.AUTO-MCNC: NEGATIVE MG/DL
LACTATE BLD-SCNC: 1.4 MMOL/L (ref 0.5–2)
LEUKOCYTE ESTERASE UR QL STRIP.AUTO: ABNORMAL
LYMPHOCYTES # BLD AUTO: 3.04 K/UL (ref 1–4.8)
LYMPHOCYTES NFR BLD: 37.4 % (ref 22–41)
MCH RBC QN AUTO: 30.3 PG (ref 27–33)
MCHC RBC AUTO-ENTMCNC: 33.4 G/DL (ref 33.6–35)
MCV RBC AUTO: 90.6 FL (ref 81.4–97.8)
MICRO URNS: ABNORMAL
MONOCYTES # BLD AUTO: 0.58 K/UL (ref 0–0.85)
MONOCYTES NFR BLD AUTO: 7.1 % (ref 0–13.4)
MUCOUS THREADS #/AREA URNS HPF: ABNORMAL /HPF
NEUTROPHILS # BLD AUTO: 4.07 K/UL (ref 2–7.15)
NEUTROPHILS NFR BLD: 50.3 % (ref 44–72)
NITRITE UR QL STRIP.AUTO: NEGATIVE
NRBC # BLD AUTO: 0 K/UL
NRBC BLD-RTO: 0 /100 WBC
PH UR STRIP.AUTO: 7.5 [PH]
PLATELET # BLD AUTO: 169 K/UL (ref 164–446)
PMV BLD AUTO: 12.1 FL (ref 9–12.9)
POTASSIUM SERPL-SCNC: 3.8 MMOL/L (ref 3.6–5.5)
PROT SERPL-MCNC: 6.7 G/DL (ref 6–8.2)
PROT UR QL STRIP: NEGATIVE MG/DL
RBC # BLD AUTO: 4.46 M/UL (ref 4.2–5.4)
RBC # URNS HPF: ABNORMAL /HPF
RBC UR QL AUTO: NEGATIVE
SODIUM SERPL-SCNC: 139 MMOL/L (ref 135–145)
SP GR UR STRIP.AUTO: 1.03
T4 FREE SERPL-MCNC: 0.86 NG/DL (ref 0.53–1.43)
TRANS CELLS #/AREA URNS HPF: ABNORMAL /HPF
TSH SERPL DL<=0.005 MIU/L-ACNC: 5.24 UIU/ML (ref 0.38–5.33)
UROBILINOGEN UR STRIP.AUTO-MCNC: 1 MG/DL
WBC # BLD AUTO: 8.1 K/UL (ref 4.8–10.8)
WBC #/AREA URNS HPF: ABNORMAL /HPF

## 2018-09-07 PROCEDURE — 84439 ASSAY OF FREE THYROXINE: CPT

## 2018-09-07 PROCEDURE — 80053 COMPREHEN METABOLIC PANEL: CPT

## 2018-09-07 PROCEDURE — 82306 VITAMIN D 25 HYDROXY: CPT

## 2018-09-07 PROCEDURE — 86140 C-REACTIVE PROTEIN: CPT

## 2018-09-07 PROCEDURE — 84703 CHORIONIC GONADOTROPIN ASSAY: CPT

## 2018-09-07 PROCEDURE — 81001 URINALYSIS AUTO W/SCOPE: CPT

## 2018-09-07 PROCEDURE — G0378 HOSPITAL OBSERVATION PER HR: HCPCS

## 2018-09-07 PROCEDURE — 83605 ASSAY OF LACTIC ACID: CPT

## 2018-09-07 PROCEDURE — 84443 ASSAY THYROID STIM HORMONE: CPT

## 2018-09-07 PROCEDURE — 85025 COMPLETE CBC W/AUTO DIFF WBC: CPT

## 2018-09-07 PROCEDURE — 85652 RBC SED RATE AUTOMATED: CPT

## 2018-09-07 PROCEDURE — 70450 CT HEAD/BRAIN W/O DYE: CPT

## 2018-09-07 PROCEDURE — 99285 EMERGENCY DEPT VISIT HI MDM: CPT

## 2018-09-07 PROCEDURE — 665999 HH PPS REVENUE DEBIT

## 2018-09-07 PROCEDURE — 665998 HH PPS REVENUE CREDIT

## 2018-09-07 PROCEDURE — 36415 COLL VENOUS BLD VENIPUNCTURE: CPT

## 2018-09-07 RX ORDER — MELATONIN 10 MG
10 CAPSULE ORAL
COMMUNITY
End: 2019-05-27

## 2018-09-07 RX ORDER — BISACODYL 10 MG
10 SUPPOSITORY, RECTAL RECTAL
Status: DISCONTINUED | OUTPATIENT
Start: 2018-09-07 | End: 2018-09-12 | Stop reason: HOSPADM

## 2018-09-07 RX ORDER — POLYETHYLENE GLYCOL 3350 17 G/17G
1 POWDER, FOR SOLUTION ORAL
Status: DISCONTINUED | OUTPATIENT
Start: 2018-09-07 | End: 2018-09-12 | Stop reason: HOSPADM

## 2018-09-07 RX ORDER — TRAZODONE HYDROCHLORIDE 100 MG/1
100 TABLET ORAL
COMMUNITY
End: 2019-04-01 | Stop reason: SDUPTHER

## 2018-09-07 RX ORDER — PREGABALIN 75 MG/1
75 CAPSULE ORAL 2 TIMES DAILY
COMMUNITY
End: 2018-09-28

## 2018-09-07 RX ORDER — CHOLESTYRAMINE LIGHT 4 G/5.7G
4 POWDER, FOR SUSPENSION ORAL
COMMUNITY
End: 2019-05-27

## 2018-09-07 RX ORDER — AMOXICILLIN 250 MG
2 CAPSULE ORAL 2 TIMES DAILY PRN
Status: DISCONTINUED | OUTPATIENT
Start: 2018-09-07 | End: 2018-09-12 | Stop reason: HOSPADM

## 2018-09-07 RX ORDER — GUAIFENESIN/DEXTROMETHORPHAN 100-10MG/5
10 SYRUP ORAL EVERY 6 HOURS PRN
Status: DISCONTINUED | OUTPATIENT
Start: 2018-09-07 | End: 2018-09-12 | Stop reason: HOSPADM

## 2018-09-07 RX ORDER — LIDOCAINE 50 MG/G
1 PATCH TOPICAL EVERY 24 HOURS
COMMUNITY
End: 2018-09-28

## 2018-09-07 RX ORDER — ACETAMINOPHEN 325 MG/1
650 TABLET ORAL EVERY 6 HOURS PRN
Status: DISCONTINUED | OUTPATIENT
Start: 2018-09-07 | End: 2018-09-08

## 2018-09-07 RX ORDER — TRIAMCINOLONE ACETONIDE 1 MG/G
1 CREAM TOPICAL 2 TIMES DAILY
COMMUNITY
End: 2018-11-15

## 2018-09-07 ASSESSMENT — PAIN SCALES - GENERAL: PAINLEVEL_OUTOF10: 9

## 2018-09-07 ASSESSMENT — LIFESTYLE VARIABLES: EVER_SMOKED: NEVER

## 2018-09-07 ASSESSMENT — PATIENT HEALTH QUESTIONNAIRE - PHQ9
1. LITTLE INTEREST OR PLEASURE IN DOING THINGS: NOT AT ALL
SUM OF ALL RESPONSES TO PHQ9 QUESTIONS 1 AND 2: 0
2. FEELING DOWN, DEPRESSED, IRRITABLE, OR HOPELESS: NOT AT ALL

## 2018-09-08 ENCOUNTER — PATIENT OUTREACH (OUTPATIENT)
Dept: HEALTH INFORMATION MANAGEMENT | Facility: OTHER | Age: 28
End: 2018-09-08

## 2018-09-08 PROBLEM — H57.11 ACUTE RIGHT EYE PAIN: Status: ACTIVE | Noted: 2018-09-08

## 2018-09-08 LAB
25(OH)D3 SERPL-MCNC: 21 NG/ML (ref 30–100)
GLUCOSE BLD-MCNC: 123 MG/DL (ref 65–99)
GLUCOSE BLD-MCNC: 147 MG/DL (ref 65–99)
GLUCOSE BLD-MCNC: 236 MG/DL (ref 65–99)
GLUCOSE BLD-MCNC: 242 MG/DL (ref 65–99)

## 2018-09-08 PROCEDURE — 96372 THER/PROPH/DIAG INJ SC/IM: CPT

## 2018-09-08 PROCEDURE — 700111 HCHG RX REV CODE 636 W/ 250 OVERRIDE (IP): Performed by: STUDENT IN AN ORGANIZED HEALTH CARE EDUCATION/TRAINING PROGRAM

## 2018-09-08 PROCEDURE — 665999 HH PPS REVENUE DEBIT

## 2018-09-08 PROCEDURE — 770006 HCHG ROOM/CARE - MED/SURG/GYN SEMI*

## 2018-09-08 PROCEDURE — A9270 NON-COVERED ITEM OR SERVICE: HCPCS | Performed by: STUDENT IN AN ORGANIZED HEALTH CARE EDUCATION/TRAINING PROGRAM

## 2018-09-08 PROCEDURE — 96366 THER/PROPH/DIAG IV INF ADDON: CPT

## 2018-09-08 PROCEDURE — 700102 HCHG RX REV CODE 250 W/ 637 OVERRIDE(OP): Performed by: STUDENT IN AN ORGANIZED HEALTH CARE EDUCATION/TRAINING PROGRAM

## 2018-09-08 PROCEDURE — 96365 THER/PROPH/DIAG IV INF INIT: CPT

## 2018-09-08 PROCEDURE — 700105 HCHG RX REV CODE 258: Performed by: STUDENT IN AN ORGANIZED HEALTH CARE EDUCATION/TRAINING PROGRAM

## 2018-09-08 PROCEDURE — 82962 GLUCOSE BLOOD TEST: CPT | Mod: 91

## 2018-09-08 PROCEDURE — 665998 HH PPS REVENUE CREDIT

## 2018-09-08 PROCEDURE — 99223 1ST HOSP IP/OBS HIGH 75: CPT | Mod: GC | Performed by: INTERNAL MEDICINE

## 2018-09-08 RX ORDER — FLUOXETINE 10 MG/1
10 CAPSULE ORAL DAILY
Status: DISCONTINUED | OUTPATIENT
Start: 2018-09-08 | End: 2018-09-12 | Stop reason: HOSPADM

## 2018-09-08 RX ORDER — ZIPRASIDONE HYDROCHLORIDE 40 MG/1
80 CAPSULE ORAL 2 TIMES DAILY
Status: DISCONTINUED | OUTPATIENT
Start: 2018-09-08 | End: 2018-09-12 | Stop reason: HOSPADM

## 2018-09-08 RX ORDER — TRAZODONE HYDROCHLORIDE 50 MG/1
100 TABLET ORAL
Status: DISCONTINUED | OUTPATIENT
Start: 2018-09-08 | End: 2018-09-12 | Stop reason: HOSPADM

## 2018-09-08 RX ORDER — PREGABALIN 75 MG/1
75 CAPSULE ORAL 2 TIMES DAILY
Status: DISCONTINUED | OUTPATIENT
Start: 2018-09-08 | End: 2018-09-08

## 2018-09-08 RX ORDER — PREGABALIN 100 MG/1
100 CAPSULE ORAL 2 TIMES DAILY
Status: DISCONTINUED | OUTPATIENT
Start: 2018-09-08 | End: 2018-09-12 | Stop reason: HOSPADM

## 2018-09-08 RX ORDER — DEXTROSE MONOHYDRATE 25 G/50ML
25 INJECTION, SOLUTION INTRAVENOUS
Status: DISCONTINUED | OUTPATIENT
Start: 2018-09-08 | End: 2018-09-12 | Stop reason: HOSPADM

## 2018-09-08 RX ORDER — BACLOFEN 10 MG/1
10 TABLET ORAL 2 TIMES DAILY
Status: DISCONTINUED | OUTPATIENT
Start: 2018-09-08 | End: 2018-09-12 | Stop reason: HOSPADM

## 2018-09-08 RX ORDER — BUSPIRONE HYDROCHLORIDE 5 MG/1
5 TABLET ORAL 2 TIMES DAILY
Status: DISCONTINUED | OUTPATIENT
Start: 2018-09-08 | End: 2018-09-12 | Stop reason: HOSPADM

## 2018-09-08 RX ORDER — CEFTRIAXONE 1 G/1
1 INJECTION, POWDER, FOR SOLUTION INTRAMUSCULAR; INTRAVENOUS DAILY
Status: DISCONTINUED | OUTPATIENT
Start: 2018-09-09 | End: 2018-09-08

## 2018-09-08 RX ORDER — CEFTRIAXONE 1 G/1
1 INJECTION, POWDER, FOR SOLUTION INTRAMUSCULAR; INTRAVENOUS DAILY
Status: DISCONTINUED | OUTPATIENT
Start: 2018-09-08 | End: 2018-09-08

## 2018-09-08 RX ORDER — IBUPROFEN 800 MG/1
400 TABLET ORAL EVERY 6 HOURS
Status: DISCONTINUED | OUTPATIENT
Start: 2018-09-08 | End: 2018-09-12 | Stop reason: HOSPADM

## 2018-09-08 RX ORDER — ACETAMINOPHEN 325 MG/1
650 TABLET ORAL EVERY 6 HOURS
Status: DISCONTINUED | OUTPATIENT
Start: 2018-09-08 | End: 2018-09-12 | Stop reason: HOSPADM

## 2018-09-08 RX ORDER — TRAMADOL HYDROCHLORIDE 50 MG/1
50 TABLET ORAL EVERY 6 HOURS PRN
Status: DISCONTINUED | OUTPATIENT
Start: 2018-09-08 | End: 2018-09-12 | Stop reason: HOSPADM

## 2018-09-08 RX ADMIN — PREGABALIN 75 MG: 75 CAPSULE ORAL at 05:43

## 2018-09-08 RX ADMIN — TRAMADOL HYDROCHLORIDE 50 MG: 50 TABLET, COATED ORAL at 23:59

## 2018-09-08 RX ADMIN — BUSPIRONE HYDROCHLORIDE 5 MG: 5 TABLET ORAL at 05:44

## 2018-09-08 RX ADMIN — BACLOFEN 10 MG: 10 TABLET ORAL at 10:27

## 2018-09-08 RX ADMIN — FLUOXETINE 10 MG: 10 CAPSULE ORAL at 05:45

## 2018-09-08 RX ADMIN — SODIUM CHLORIDE 250 MG: 9 INJECTION, SOLUTION INTRAVENOUS at 23:59

## 2018-09-08 RX ADMIN — SODIUM CHLORIDE 250 MG: 9 INJECTION, SOLUTION INTRAVENOUS at 06:35

## 2018-09-08 RX ADMIN — ACETAMINOPHEN 650 MG: 325 TABLET, FILM COATED ORAL at 17:20

## 2018-09-08 RX ADMIN — BACLOFEN 10 MG: 10 TABLET ORAL at 17:20

## 2018-09-08 RX ADMIN — TRAMADOL HYDROCHLORIDE 50 MG: 50 TABLET, COATED ORAL at 17:20

## 2018-09-08 RX ADMIN — SODIUM CHLORIDE 250 MG: 9 INJECTION, SOLUTION INTRAVENOUS at 17:20

## 2018-09-08 RX ADMIN — PREGABALIN 100 MG: 100 CAPSULE ORAL at 17:20

## 2018-09-08 RX ADMIN — BUSPIRONE HYDROCHLORIDE 5 MG: 5 TABLET ORAL at 17:20

## 2018-09-08 RX ADMIN — ASPIRIN 81 MG: 81 TABLET, COATED ORAL at 05:43

## 2018-09-08 RX ADMIN — SODIUM CHLORIDE 250 MG: 9 INJECTION, SOLUTION INTRAVENOUS at 12:30

## 2018-09-08 RX ADMIN — ZIPRASIDONE HCL 80 MG: 40 CAPSULE ORAL at 05:44

## 2018-09-08 RX ADMIN — CEFTRIAXONE SODIUM 1 G: 1 INJECTION, POWDER, FOR SOLUTION INTRAMUSCULAR; INTRAVENOUS at 05:43

## 2018-09-08 RX ADMIN — TRAZODONE HYDROCHLORIDE 100 MG: 50 TABLET ORAL at 20:42

## 2018-09-08 RX ADMIN — INSULIN LISPRO 2 UNITS: 100 INJECTION, SOLUTION INTRAVENOUS; SUBCUTANEOUS at 20:43

## 2018-09-08 RX ADMIN — ENOXAPARIN SODIUM 40 MG: 40 INJECTION SUBCUTANEOUS at 05:43

## 2018-09-08 RX ADMIN — IBUPROFEN 400 MG: 800 TABLET, FILM COATED ORAL at 10:26

## 2018-09-08 RX ADMIN — ACETAMINOPHEN 650 MG: 325 TABLET, FILM COATED ORAL at 08:48

## 2018-09-08 RX ADMIN — SODIUM CHLORIDE 250 MG: 9 INJECTION, SOLUTION INTRAVENOUS at 01:24

## 2018-09-08 RX ADMIN — ZIPRASIDONE HCL 80 MG: 40 CAPSULE ORAL at 17:20

## 2018-09-08 ASSESSMENT — ENCOUNTER SYMPTOMS
DIZZINESS: 1
DIARRHEA: 0
SENSORY CHANGE: 1
FALLS: 0
EYE REDNESS: 0
EYE PAIN: 1
SEIZURES: 0
POLYDIPSIA: 0
FEVER: 0
BLOOD IN STOOL: 0
CHILLS: 0
DOUBLE VISION: 0
CHILLS: 1
EYE DISCHARGE: 0
SHORTNESS OF BREATH: 0
SORE THROAT: 0
ABDOMINAL PAIN: 0
NAUSEA: 0
VOMITING: 1
NAUSEA: 1
SINUS PAIN: 0
BRUISES/BLEEDS EASILY: 0
FLANK PAIN: 0
COUGH: 1
NERVOUS/ANXIOUS: 0
FEVER: 1
MYALGIAS: 0
HEADACHES: 0
CONSTIPATION: 0
VOMITING: 0
COUGH: 0
BLURRED VISION: 1
DEPRESSION: 0
LOSS OF CONSCIOUSNESS: 0
INSOMNIA: 1

## 2018-09-08 ASSESSMENT — COGNITIVE AND FUNCTIONAL STATUS - GENERAL
CLIMB 3 TO 5 STEPS WITH RAILING: A LITTLE
SUGGESTED CMS G CODE MODIFIER MOBILITY: CI
DAILY ACTIVITIY SCORE: 24
SUGGESTED CMS G CODE MODIFIER DAILY ACTIVITY: CH
MOBILITY SCORE: 23

## 2018-09-08 ASSESSMENT — PAIN SCALES - GENERAL
PAINLEVEL_OUTOF10: 8
PAINLEVEL_OUTOF10: 2
PAINLEVEL_OUTOF10: 8

## 2018-09-08 ASSESSMENT — LIFESTYLE VARIABLES: SUBSTANCE_ABUSE: 0

## 2018-09-08 NOTE — PROGRESS NOTES
Pt c/o 8/10 pain R eye. Pt states the Tylenol and Motrin did not help with her pain. [facility] paged, awaiting call back.

## 2018-09-08 NOTE — ASSESSMENT & PLAN NOTE
-insulin sliding scale, full home medication records not received yet    Discharge: Con't home insulin pump

## 2018-09-08 NOTE — ASSESSMENT & PLAN NOTE
-patient on O2 at home until medicare stopped supplying  -O2 protocol and continuous O2 monitoring    Discharge: Home O2

## 2018-09-08 NOTE — ED TRIAGE NOTES
Pt BIB EMS from [facility], Pt states 8/10 right eye pain. Pt has history of optic neuritis and states this feels like the same as before.

## 2018-09-08 NOTE — PROGRESS NOTES
Admission assessment complete.  Pt resting in bed, calm and no distress.  Rates pain 9/10 in right eye.  Eye not red or swollen.  Call light in reach.  Bed alarm is on.  Will monitor

## 2018-09-08 NOTE — CARE PLAN
Problem: Pain Management  Goal: Pain level will decrease to patient's comfort goal  Outcome: NOT MET

## 2018-09-08 NOTE — H&P
"      Internal Medicine Admitting History and Physical    Note Author: Bassam Rojas M.D.       Name Kristin Balderrama     1990   Age/Sex 27 y.o. female   MRN [id number]   Code Status Full code     After 5PM or if no immediate response to page, please call for cross-coverage  Attending/Team: Olga Lidia/Harish See Patient List for primary contact information  Call [phone] to page    1st Call - Day Intern (R1):   Chai 2nd Call - Day Sr. Resident (R2/R3):   Ryan       Chief Complaint:   Right eye pain    HPI:  27F, PMH of mitochondrial disorder with dropped foot, DM, Afib, chronic back pain s/p surgery, arthritis, hemiplegic migraines, asthma, TONYA, anxiety, depression, schizophrenia, presents with right eye pain. Her right eye has been painful with a sharp \"stabbing\" sensation progressive over the last 5 days, her vision has become increasingly blurry and she has been less able to appreciate colors, exacerbated on movement and no relief with tylenol. Per patient, she had two past hospital visits for optic neuritis around /July and last September which was treated with steroid drops and resolved. She recently was admitted for left breast cellulitis and is currently on day 6 of 14 for ceftriaxone therapy was previously at [facility]. She has an Interstim device implanted in her back for bowel incontinence.    Records and full med list still pending. Partial med list reconciled.    Review of Systems   Constitutional: Positive for chills and fever.        Subjective fv/chills   HENT: Negative for ear pain, hearing loss, sinus pain and sore throat.    Eyes: Positive for blurred vision and pain. Negative for double vision, discharge and redness.   Respiratory: Positive for cough.         Chronic dry cough   Gastrointestinal: Positive for nausea and vomiting. Negative for blood in stool, constipation, diarrhea and melena.        N/v with pain, emesis yellow to brown   Genitourinary: Negative for dysuria " "and hematuria.   Musculoskeletal: Negative for joint pain and myalgias.   Skin: Negative for itching and rash.   Neurological: Positive for dizziness and sensory change. Negative for headaches.        Dizzy/lightheaded with pain, right eye decreased color perception   Psychiatric/Behavioral: Negative for depression. The patient is not nervous/anxious.         Hx of depression/anxiety but currently feels \"okay\" with medication             Past Medical History (Chronic medical problem, known complications and current treatment)    mitochondrial disorder with dropped foot  DM  Afib  chronic back pain s/p surgery  Arthritis  hemiplegic migraines  Asthma  TONYA  Anxiety  depression    Past Surgical History:  Past Surgical History:   Procedure Laterality Date   • MUSCLE BIOPSY Right 1/26/2017    Procedure: MUSCLE BIOPSY - THIGH;  Surgeon: Isidro Vigil M.D.;  Location: [facility];  Service:    • GASTROSCOPY WITH BALLOON DILATATION N/A 1/4/2017    Procedure: GASTROSCOPY WITH DILATATION;  Surgeon: Torres Vargas M.D.;  Location: [facility];  Service:    • BOWEL STIMULATOR INSERTION  7/13/2016    Procedure: BOWEL STIMULATOR INSERTION FOR PERMANENT INTERSTIM SACRAL IMPLANT;  Surgeon: Joe Noyola M.D.;  Location: [facility];  Service:    • RECOVERY  1/27/2016    Procedure: CATH LAB EP STUDY WITH SINUS NODE MODIFICATION LA;  Surgeon: Recoveryonly Surgery;  Location: SURGERY PRE-POST PROC UNIT [facility] [place];  Service:    • OTHER CARDIAC SURGERY  1/2016    cardiac ablation   • NEURO DEST FACET L/S W/IG SNGL  4/21/2015    Performed by Reza Tabor at [facility]   • LUMBAR FUSION ANTERIOR  8/21/2012    Performed by SUSIE SAWANT at [facility]   • ALYSSA BY LAPAROSCOPY  8/29/2010    Performed by SHAYY JOHNS at [facility]   • LAMINOTOMY     • OTHER ABDOMINAL SURGERY     • TONSILLECTOMY      tonsillectomy       Current Outpatient Medications:  Home " "Medications     Reviewed by Ulisses Bourne (Pharmacy Tech) on 09/07/18 at 2043  Med List Status: Complete   Medication Last Dose Status   aspirin EC (ECOTRIN) 81 MG Tablet Delayed Response 9/7/2018 Active   baclofen (LIORESAL) 10 MG Tab 9/7/2018 Active   busPIRone (BUSPAR) 10 MG Tab 9/7/2018 Active   cefTRIAXone (ROCEPHIN) 1 GM Recon Soln 9/6/2018 Active   cholestyramine (QUESTRAN) 4 g packet 9/7/2018 Active   cholestyramine (QUESTRAN,PREVALITE) 4 GM Pack 9/7/2018 Active   cyanocobalamin ([employer] VITAMIN B-12) 500 MCG tablet 9/7/2018 Active   fluoxetine (PROZAC) 10 MG Cap 9/7/2018 Active   furosemide (LASIX) 40 MG Tab 9/7/2018 Active   ivabradine (CORLANOR) 5 MG Tab tablet 9/7/2018 Active   lidocaine (LIDODERM) 5 % Patch 9/7/2018 Active   liraglutide (VICTOZA) 18 MG/3ML Solution Pen-injector injection 9/7/2018 Active   melatonin 3 MG Tab 9/6/2018 Active   nystatin (MYCOSTATIN) 229392 UNIT/GM Cream topical cream 9/6/2018 Active   pregabalin (LYRICA) 75 MG Cap 9/7/2018 Active   SITagliptin (JANUVIA) 25 MG Tab 9/7/2018 Active   sodium bicarbonate (SODIUM BICARBONATE) 650 MG Tab 9/7/2018 Active   traZODone (DESYREL) 100 MG Tab 9/6/2018 Active   triamcinolone acetonide (KENALOG) 0.1 % Cream 9/6/2018 Active   vitamin D (CHOLECALCIFEROL) 1000 UNIT Tab 9/7/2018 Active   ziprasidone (GEODON) 80 MG Cap 9/7/2018 Active                Medication Allergy/Sensitivities:  Allergies   Allergen Reactions   • Cefdinir Shortness of Breath and Itching     Tolerated 1/18/17  Tolerates ceftriaxone    • Depakote [Divalproex Sodium] Unspecified     Muscle spasms/muscle pain and weakness     • Doxycycline Anaphylaxis and Vomiting     RXN=unknown   • Abilify Unspecified     Headaches/muscle twitching     • Amitriptyline Unspecified     Headaches     • Amoxicillin Rash     Pt states \"I get a rash\".     • Ciprofloxacin Rash     Pt states \"I get a rash\".     • Clindamycin Nausea     Even with food     • Ees [Erythromycin] Vomiting and Nausea   • " "Flagyl [Metronidazole Hcl] Unspecified     \"eye problems\"     • Flomax [Tamsulosin Hydrochloride] Swelling   • Metformin Unspecified     Increased lactic acid      • Sulfa Drugs Hives and Rash     RXN=since childhood   • Tape Rash     Tears skin off   coban with Tegaderm tape ok  RXN=ongoing   • Vancomycin Itching     Pt becomes flushed in face and chest.   RXN=7/10/16   • Wound Dressing Adhesive Hives     By pt report   • Cephalexin [Keflex] Rash     Pt states she gets a rash with this medication  Tolerates ceftriaxone   • Erythromycin Rash     .   • Levofloxacin Unspecified     Leg muscle cramps   • Metronidazole Rash     .   • Valproic Acid Rash     .         Family History (mandatory)   Family History   Problem Relation Age of Onset   • Hypertension Mother    • Sleep Apnea Mother    • Heart Disease Mother    • Other Mother         hypothryod   • Hypertension Maternal Uncle    • Heart Disease Maternal Grandmother    • Hypertension Maternal Grandmother    • No Known Problems Sister    • Other Sister         Narcolepsy;fibromyalsia;bone;nerve   • Genitourinary () Sister         endometriosis       Social History (mandatory)   Social History     Social History   • Marital status: Single     Spouse name: N/A   • Number of children: N/A   • Years of education: N/A     Occupational History   • Not on file.     Social History Main Topics   • Smoking status: Never Smoker   • Smokeless tobacco: Never Used   • Alcohol use No   • Drug use: Yes     Types: Marijuana, Inhaled      Comment: edibles, MJ   • Sexual activity: Not Currently     Birth control/ protection: Pill     Other Topics Concern   • Not on file     Social History Narrative    ** Merged History Encounter **          Living situation: Lives in [place] with grandparents, aunt & uncle  PCP : Torres Brody M.D.    Physical Exam     Vitals:    09/07/18 2101 09/07/18 2108 09/08/18 0105 09/08/18 0538   BP: 142/93 124/58 123/59 132/62   Pulse: 68 78 70 79   Resp: 16 " "18 18 18   Temp:  36.6 °C (97.9 °F) 36.4 °C (97.5 °F) 36.6 °C (97.9 °F)   SpO2:  94% 95% 94%   Weight:  120 kg (264 lb 8.8 oz)     Height:  1.651 m (5' 5\")       Body mass index is 44.02 kg/m².  /62   Pulse 79   Temp 36.6 °C (97.9 °F)   Resp 18   Ht 1.651 m (5' 5\")   Wt 120 kg (264 lb 8.8 oz)   LMP 09/07/2015   SpO2 94%   Breastfeeding? No   BMI 44.02 kg/m²   O2 therapy: Pulse Oximetry: 94 %, O2 (LPM): 0, O2 Delivery: None (Room Air)    Physical Exam   Constitutional: She is oriented to person, place, and time and well-developed, well-nourished, and in no distress. No distress.   HENT:   Head: Normocephalic and atraumatic.   Mouth/Throat: Oropharynx is clear and moist. No oropharyngeal exudate.   Eyes: Pupils are equal, round, and reactive to light. Conjunctivae and EOM are normal. Right eye exhibits no discharge. Left eye exhibits no discharge. No scleral icterus.   Extreme pain on EOM of right eye but ROM intact, about 20/100 right eye, marked decrease of peripheral fields on right eye   Neck: Normal range of motion. Neck supple. No tracheal deviation present.   Cardiovascular: Normal rate, regular rhythm, normal heart sounds and intact distal pulses.  Exam reveals no gallop and no friction rub.    No murmur heard.  Pulmonary/Chest: Effort normal and breath sounds normal. No respiratory distress. She has no wheezes. She has no rales. She exhibits no tenderness.   Abdominal: Soft. Bowel sounds are normal. She exhibits no distension and no mass. There is no tenderness. There is no rebound and no guarding.   Musculoskeletal: Normal range of motion. She exhibits no edema, tenderness or deformity.   Neurological: She is alert and oriented to person, place, and time. No cranial nerve deficit. She exhibits normal muscle tone. Coordination normal.   Skin: Skin is warm and dry. No rash noted. She is not diaphoretic. No erythema. No pallor.   Psychiatric: Mood and affect normal.         Data Review       Old " Records Request:   Deferred full records not received, limited records reviewed  Current Records review/summary: Completed    Lab Data Review:  Recent Results (from the past 24 hour(s))   URINALYSIS,CULTURE IF INDICATED    Collection Time: 09/07/18  6:40 PM   Result Value Ref Range    Color Yellow     Character Clear     Specific Gravity 1.028 <1.035    Ph 7.5 5.0 - 8.0    Glucose Negative Negative mg/dL    Ketones Negative Negative mg/dL    Protein Negative Negative mg/dL    Bilirubin Negative Negative    Urobilinogen, Urine 1.0 Negative    Nitrite Negative Negative    Leukocyte Esterase Trace (A) Negative    Occult Blood Negative Negative    Micro Urine Req Microscopic    URINE MICROSCOPIC (W/UA)    Collection Time: 09/07/18  6:40 PM   Result Value Ref Range    WBC 2-5 /hpf    RBC 0-2 /hpf    Bacteria Moderate (A) None /hpf    Epithelial Cells Moderate (A) /hpf    Trans Epithelial Cells Few /hpf    Mucous Threads Few /hpf   WESTERGREN SED RATE    Collection Time: 09/07/18  6:42 PM   Result Value Ref Range    Sed Rate Westergren 16 0 - 20 mm/hour   CBC WITH DIFFERENTIAL    Collection Time: 09/07/18  6:52 PM   Result Value Ref Range    WBC 8.1 4.8 - 10.8 K/uL    RBC 4.46 4.20 - 5.40 M/uL    Hemoglobin 13.5 12.0 - 16.0 g/dL    Hematocrit 40.4 37.0 - 47.0 %    MCV 90.6 81.4 - 97.8 fL    MCH 30.3 27.0 - 33.0 pg    MCHC 33.4 (L) 33.6 - 35.0 g/dL    RDW 42.5 35.9 - 50.0 fL    Platelet Count 169 164 - 446 K/uL    MPV 12.1 9.0 - 12.9 fL    Neutrophils-Polys 50.30 44.00 - 72.00 %    Lymphocytes 37.40 22.00 - 41.00 %    Monocytes 7.10 0.00 - 13.40 %    Eosinophils 4.40 0.00 - 6.90 %    Basophils 0.60 0.00 - 1.80 %    Immature Granulocytes 0.20 0.00 - 0.90 %    Nucleated RBC 0.00 /100 WBC    Neutrophils (Absolute) 4.07 2.00 - 7.15 K/uL    Lymphs (Absolute) 3.04 1.00 - 4.80 K/uL    Monos (Absolute) 0.58 0.00 - 0.85 K/uL    Eos (Absolute) 0.36 0.00 - 0.51 K/uL    Baso (Absolute) 0.05 0.00 - 0.12 K/uL    Immature Granulocytes  (abs) 0.02 0.00 - 0.11 K/uL    NRBC (Absolute) 0.00 K/uL   COMP METABOLIC PANEL    Collection Time: 09/07/18  6:52 PM   Result Value Ref Range    Sodium 139 135 - 145 mmol/L    Potassium 3.8 3.6 - 5.5 mmol/L    Chloride 106 96 - 112 mmol/L    Co2 22 20 - 33 mmol/L    Anion Gap 11.0 0.0 - 11.9    Glucose 84 65 - 99 mg/dL    Bun 11 8 - 22 mg/dL    Creatinine 0.71 0.50 - 1.40 mg/dL    Calcium 9.6 8.5 - 10.5 mg/dL    AST(SGOT) 20 12 - 45 U/L    ALT(SGPT) 42 2 - 50 U/L    Alkaline Phosphatase 58 30 - 99 U/L    Total Bilirubin 0.6 0.1 - 1.5 mg/dL    Albumin 4.4 3.2 - 4.9 g/dL    Total Protein 6.7 6.0 - 8.2 g/dL    Globulin 2.3 1.9 - 3.5 g/dL    A-G Ratio 1.9 g/dL   LACTIC ACID    Collection Time: 09/07/18  6:52 PM   Result Value Ref Range    Lactic Acid 1.4 0.5 - 2.0 mmol/L   BETA-HCG QUALITATIVE SERUM    Collection Time: 09/07/18  6:52 PM   Result Value Ref Range    Beta-Hcg Qualitative Serum Negative Negative   TSH    Collection Time: 09/07/18  6:52 PM   Result Value Ref Range    TSH 5.240 0.380 - 5.330 uIU/mL   FREE THYROXINE    Collection Time: 09/07/18  6:52 PM   Result Value Ref Range    Free T-4 0.86 0.53 - 1.43 ng/dL   ESTIMATED GFR    Collection Time: 09/07/18  6:52 PM   Result Value Ref Range    GFR If African American >60 >60 mL/min/1.73 m 2    GFR If Non African American >60 >60 mL/min/1.73 m 2   CRP QUANTITIVE (NON-CARDIAC)    Collection Time: 09/07/18  6:52 PM   Result Value Ref Range    Stat C-Reactive Protein 0.75 0.00 - 0.75 mg/dL   ACCU-CHEK GLUCOSE    Collection Time: 09/08/18  5:42 AM   Result Value Ref Range    Glucose - Accu-Ck 123 (H) 65 - 99 mg/dL       Imaging/Procedures Review:    Independant Imaging Review: Completed  CT-HEAD W/O   Final Result      Head CT without contrast within normal limits. No evidence of acute cerebral infarction, hemorrhage or mass lesion. If there is clinical concern for optic neuritis then orbital MRI without and with contrast would be the study of choice.                           EKG:   EKG Independant Review: none  QTc:-, HR: -, Normal Sinus Rhythm, no ST/T changes -    Records reviewed and summarized in current documentation :  Yes  [employer] teaching service handout given to patient:  No         Assessment/Plan     * Acute right eye pain   Assessment & Plan    -5d of right stabbing eye pain, with decreased peripheral vision, blurry vision, decreased appreciation of colors, extreme pain on EOM, no injection   -ESR/CRP non elevated (temporal arteritis less likely)  -prior admissions for similar eye pain, prv diagnosed with optic neuritis and treated with steroid eye drops with resolution per pt  -possible optic neuritis, neuromyelitis optica, MS (all treated with steroids)  -solumedrol iv 250mg q6h x3days, prednisone po 1 mg/kg x11day & 4 day taper for presumed optic neuritis treatment  -consider MRI, patient has Interstim device for bowel stimulation to be addressed before MRI (Dr. Noyola - for possibility of turning it off for MRI)  -consider neuro consult (attempted to call, call back pending)        Cellulitis of left breast- (present on admission)   Assessment & Plan    -patient on day 6/14 of ceftriaxone, was at SN[facility], from recent admission for cellulitis  -continue ceftriaxone        Type 2 diabetes mellitus without complication, without long-term current use of insulin (HCC)- (present on admission)   Assessment & Plan    -insulin sliding scale, full home medication records not received yet        Chronic respiratory failure with hypoxia, on home oxygen therapy (HCC)- (present on admission)   Assessment & Plan    -patient on O2 at home until medicare stopped supplying, pt reports transient desats to 60% on rare occasion  -O2 protocol and continuous O2 monitoring        TONYA (obstructive sleep apnea)- (present on admission)   Assessment & Plan    -patient on O2 at home until medicare stopped supplying  -O2 protocol and continuous O2 monitoring        Chronic pain syndrome- (present  on admission)   Assessment & Plan    -continue home dose baclofen        Peripheral neuropathy (CMS-HCC)- (present on admission)   Assessment & Plan    -continue home does pregabalin        Depression- (present on admission)   Assessment & Plan    -continue home dose trazadone        Psychosis, schizophrenia, simple (HCC)- (present on admission)   Assessment & Plan    -continue home does geodon        Anxiety- (present on admission)   Assessment & Plan    -continue home dose fluoxetine            Anticipated Hospital stay: Observation admit        Quality Measures  Quality-Core Measures   Reviewed items::  Labs reviewed, Medications reviewed and Radiology images reviewed    PCP: Torres Brody M.D.

## 2018-09-08 NOTE — ED PROVIDER NOTES
"ED Provider Note    Scribed for Dino Morales M.D. by Edwin Corado. 9/7/2018, 5:59 PM.    Primary care provider: Torres Brody M.D.  Means of arrival: [place]  History obtained from: Patient  History limited by: None    CHIEF COMPLAINT  Chief Complaint   Patient presents with   • Eye Pain     right eye       HPI  Kristin Balderrama is a 27 y.o. female with a past medical history of mitochondrial disease, optic neuritis, multiple psychiatric comorbidities, and migraine who presents to the Emergency Department complaining of right eye pain onset four days ago. The patient reports that her right eye symptoms initially started with clear tearing and progressed to seeing \"colors\" and then significant eye pain. She reports associated elevated temperature, nausea, vomiting, and dizziness, as well as difficulty concentrating. Patient reports that when she closes her right eye, her left eye feels normal but she then has trouble focusing when both are open at the same time. She has had a history of chronic nausea but has not taken any of her nausea medications. She has taken Tylenol without relief of her eye pain. She is currently admitted to a rehab facility after a ground-level fall and notified the nursing facility's physician who initially thought her symptoms may be due to diabetes. After her symptoms progressed over the course of the week, the physician ultimately attempted calling multiple ophthalmologist today but all of the offices were closed. She has seen an ophthalmologist, Dr. Zee, and an optometrist, Dr. Avelar, in the past for similar symptoms. He noted she previously had inflamed and irritated eyelid and prescribed her eye drops with no relief.     REVIEW OF SYSTEMS  Pertinent positives include right eye pain, tears, irritation, mild fever, nausea, emesis, dizziness, light-headedness, and blurred vision change. Pertinent negatives include no diplopia. As above, all other systems reviewed and are " negative.   See HPI for further details.     PAST MEDICAL HISTORY   has a past medical history of Abdominal pain; Anginal syndrome; Apnea, sleep; Arrhythmia; Arthritis; ASTHMA; Atrial fibrillation (HCC); Back pain; Borderline personality disorder; Breath shortness; Cardiac arrhythmia; Chickenpox; Chronic UTI (9/18/2010); Cough; Daytime sleepiness; Depression; Diabetes (HCC); Diarrhea; Disorder of thyroid; Fall; Fatigue; Frequent headaches; Gasping for breath; Gynecological disorder; Headache(784.0); Heart burn; History of falling; Hypertension; Migraine; 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HCC); Multiple personality disorder; Nausea; Obesity; Pain (08-15-12); Painful joint; PCOS (polycystic ovarian syndrome); Pneumonia (2012); Psychosis; Renal disorder; Ringing in ears; Scoliosis; Shortness of breath; Sinus tachycardia (10/31/2013); Sleep apnea; Snoring; Tonsillitis; Tuberculosis; Urinary bladder disorder; Urinary incontinence; Weakness; and Wears glasses.    SURGICAL HISTORY   has a past surgical history that includes neuro dest facet l/s w/ig sngl (4/21/2015); recovery (1/27/2016); [place] by laparoscopy (8/29/2010); lumbar fusion anterior (8/21/2012); other cardiac surgery (1/2016); tonsillectomy; bowel stimulator insertion (7/13/2016); gastroscopy with balloon dilatation (N/A, 1/4/2017); muscle biopsy (Right, 1/26/2017); other abdominal surgery; and laminotomy.    SOCIAL HISTORY  Social History   Substance Use Topics   • Smoking status: Never Smoker   • Smokeless tobacco: Never Used   • Alcohol use No      History   Drug Use   • Types: Marijuana, Inhaled     Comment: ROJELIO brooks       FAMILY HISTORY  Family History   Problem Relation Age of Onset   • Hypertension Mother    • Sleep Apnea Mother    • Heart Disease Mother    • Other Mother         hypothryod   • Hypertension Maternal Uncle    • Heart Disease Maternal Grandmother    • Hypertension Maternal Grandmother    • No Known Problems Sister    • Other Sister          Narcolepsy;fibromyalsia;bone;nerve   • Genitourinary () Sister         endometriosis       CURRENT MEDICATIONS  Home Medications     Reviewed by Angie Barrientos R.N. (Registered Nurse) on 09/07/18 at 1730  Med List Status: Not Addressed   Medication Last Dose Status   albuterol 108 (90 Base) MCG/ACT Aero Soln inhalation aerosol 9/4/2018 Active   aspirin EC (ECOTRIN) 81 MG Tablet Delayed Response 9/4/2018 Active   baclofen (LIORESAL) 10 MG Tab 9/4/2018 Active   busPIRone (BUSPAR) 10 MG Tab 9/4/2018 Active   cefTRIAXone (ROCEPHIN) 1 GM Recon Soln 9/3/2018 Active   Cholecalciferol (VITAMIN D3) 67707 units Cap 9/4/2018 Active   cholestyramine (QUESTRAN) 4 g packet 9/4/2018 Active   clindamycin (CLEOCIN T) 1 % Gel 9/4/2018 Active   cyanocobalamin ([employer] VITAMIN B-12) 500 MCG tablet 9/4/2018 Active   fluconazole (DIFLUCAN) 150 MG tablet 9/4/2018 Active   fluoxetine (PROZAC) 10 MG Cap 9/4/2018 Active   furosemide (LASIX) 40 MG Tab 9/4/2018 Active   gabapentin (NEURONTIN) 600 MG tablet 8/13/2018 Active   heparin lock flush 10 UNIT/ML Solution  Active   ivabradine (CORLANOR) 5 MG Tab tablet 9/4/2018 Active   Lactobacillus (ACIDOPHILUS) 0.5 MG Tab 9/4/2018 Active   liraglutide (VICTOZA) 18 MG/3ML Solution Pen-injector injection 9/4/2018 Active   Melatonin 5 MG Tab 9/4/2018 Active   mupirocin (BACTROBAN) 2 % Ointment 9/4/2018 Active   nystatin (MYCOSTATIN) 018120 UNIT/GM Cream topical cream 9/4/2018 Active   NYSTATIN EX 9/4/2018 Active   pregabalin (LYRICA) 75 MG Cap 9/4/2018 Active   promethazine (PHENERGAN) 25 MG Suppos 9/4/2018 Active   Saccharomyces boulardii (PROBIOTIC) 250 MG Cap 9/4/2018 Active   sodium bicarbonate (SODIUM BICARBONATE) 650 MG Tab 9/4/2018 Active   Sodium Chloride Flush (NORMAL SALINE FLUSH IV) 8/28/2018 Active   traZODone (DESYREL) 100 MG Tab 9/4/2018 Active   ziprasidone (GEODON) 80 MG Cap 9/4/2018 Active                ALLERGIES  Allergies   Allergen Reactions   • Cefdinir Shortness of Breath  "and Itching     Tolerated 1/18/17  Tolerates ceftriaxone    • Depakote [Divalproex Sodium] Unspecified     Muscle spasms/muscle pain and weakness     • Doxycycline Anaphylaxis and Vomiting     RXN=unknown   • Abilify Unspecified     Headaches/muscle twitching     • Amitriptyline Unspecified     Headaches     • Amoxicillin Rash     Pt states \"I get a rash\".     • Ciprofloxacin Rash     Pt states \"I get a rash\".     • Clindamycin Nausea     Even with food     • Ees [Erythromycin] Vomiting and Nausea   • Flagyl [Metronidazole Hcl] Unspecified     \"eye problems\"     • Flomax [Tamsulosin Hydrochloride] Swelling   • Metformin Unspecified     Increased lactic acid      • Sulfa Drugs Hives and Rash     RXN=since childhood   • Tape Rash     Tears skin off   coban with Tegaderm tape ok  RXN=ongoing   • Vancomycin Itching     Pt becomes flushed in face and chest.   RXN=7/10/16   • Wound Dressing Adhesive Hives     By pt report   • Cephalexin [Keflex] Rash     Pt states she gets a rash with this medication  Tolerates ceftriaxone   • Erythromycin Rash     .   • Levofloxacin Unspecified     Leg muscle cramps   • Metronidazole Rash     .   • Valproic Acid Rash     .       PHYSICAL EXAM  VITAL SIGNS: /74   Pulse 75   Temp 37.1 °C (98.8 °F)   Resp 18   Ht 1.651 m (5' 5\")   Wt 119.3 kg (263 lb)   LMP  (LMP Unknown) Comment: 2-3 years ago  BMI 43.77 kg/m²   Vitals reviewed.  Constitutional: Alert in no acute distress.  HENT: No signs of trauma, Bilateral external ears normal, Nose normal, Decreased sensation in the right face.  Eyes: Pain with movement of right eye, Conjunctiva normal, Non-icteric, unable to look laterally with the left eye when both eyes are open but when the right eye is covered she is able to fully look to the left with the left eye. Patient is able to move her right eye left and right with pain when her left eye is covered. No drainage.  Neck: Normal range of motion, No tenderness, Supple, No stridor. "   Lymphatic: No lymphadenopathy noted.   Cardiovascular: Regular rate and rhythm, no murmurs.   Thorax & Lungs: Normal breath sounds, No respiratory distress, No wheezing, No chest tenderness.   Abdomen: Bowel sounds normal, Soft, No tenderness, No peritoneal signs, No masses, No pulsatile masses.   Skin: Warm, Dry, No erythema, No rash.   Extremities: Intact distal pulses, No edema, No tenderness, No cyanosis  Musculoskeletal: Good range of motion in all major joints. No tenderness to palpation or major deformities noted. Brace on left lower extremity.   Neurologic: Alert, Normal motor function, decreased sensation along the right side of the body, decreased sensation in the right face but otherwise no obvious cranial nerve deficits. Decreased strength and sensation on right side, ataxic right-sided finger to nose, no dysdiadochokinesia.   Psychiatric: Odd affect.      DIAGNOSTIC STUDIES / PROCEDURES    LABS  Labs Reviewed   CBC WITH DIFFERENTIAL - Abnormal; Notable for the following:        Result Value    MCHC 33.4 (*)     All other components within normal limits   URINALYSIS,CULTURE IF INDICATED - Abnormal; Notable for the following:     Leukocyte Esterase Trace (*)     All other components within normal limits   URINE MICROSCOPIC (W/UA) - Abnormal; Notable for the following:     Bacteria Moderate (*)     Epithelial Cells Moderate (*)     All other components within normal limits   COMP METABOLIC PANEL   LACTIC ACID   HCG QUAL SERUM   TSH   FREE THYROXINE      All labs reviewed by me.    RADIOLOGY  CT-HEAD W/O   Final Result      Head CT without contrast within normal limits. No evidence of acute cerebral infarction, hemorrhage or mass lesion. If there is clinical concern for optic neuritis then orbital MRI without and with contrast would be the study of choice.        The radiologist's interpretation of all radiological studies have been reviewed by me.    COURSE & MEDICAL DECISION MAKING  Nursing notes, VS,  PMSFHx reviewed in chart.  Differential diagnoses include but not limited to: Neoplasm vs hyperglycemia/hypoglycemia, electrolyte abnormality, Space Occupying Lesion vs. Optic Neuritis vs. Multiple Sclerosis vs. CVA vs. Less likely Orbital Cellulitis      5:59 PM Patient seen and examined at bedside. Patient arrives mildly hypertensive but afebrile with otherwise normal vital signs. Patient appears well hydrated and non-toxic. The physical exam is remarkable for decreased sensation along the entire right side of the body. She is unable to fully look left and right when both of her eyes are open but when either of her eyes is covered she is then able to look left and right with the uncovered eye which is odd. I am unsure what the etiology of her symptoms are today but will order a noncontrasted CT of her head to evaluate for neoplasm/space-occupying lesion/intracranial hemorrhage as well as labs. Ordered CT Head, CBC, TSH, Free Thyroxine, CMP, Lactic Acid, UA, and Beta-HCG to evaluate. Despite the patient's multiple neurologic complaints, she is outside the window for any lytic or interventional radiology intervention.     6:09 PM - Review of past medical records.     Labs are normal. Urinalysis reveals slight leukocyte esterase and moderate bacteria but is a contaminated specimen. She denies any dysuria so will defer antibiotic treatment until the culture returns. Thankfully, CT head is unremarkable. She will require an MRI and further workup with neuro-ophthalmology. She does not require either tonight and can be admitted for further workup.    7:31 PM - [facility] internal medicine team Paged.     7:40 PM - Spoke with [facility] internal medicine. I discussed my findings with the resident and they agree to admit the patient.     DISPOSITION:  Patient will be admitted to [facility] internal medicine in guarded condition.    FINAL IMPRESSION  1. Pain of right eye        I, Edwin Corado (Scribe), am scribing for, and in the presence of,  Dino Morales M.D..    Electronically signed by: Edwin Corado (Scribe), 9/7/2018    I, Dino Morales M.D. personally performed the services described in this documentation, as scribed by Edwin Corado in my presence, and it is both accurate and complete. C.     The note accurately reflects work and decisions made by me.  Dino Morales  9/8/2018  2:33 AM

## 2018-09-08 NOTE — PROGRESS NOTES
Paged [facility] for pain meds, pt home med of Baclofen and to see if pt could have rocephin iv instead of im.  Will await call back

## 2018-09-08 NOTE — ED NOTES
Med rec updated and complete.  Allergies reviewed. Per MARCIO from   [facility]. Pt is currently on ceftriaxone. Start   Date 09/04/18 for a 10 day course.

## 2018-09-08 NOTE — ED NOTES
Report received from Jimmy HASSAN. Pt in room resting Pt exhibits no acute distress. Pt denies any current needs. Will continue to monitor.

## 2018-09-08 NOTE — PROGRESS NOTES
"Pt stated that she told dr that she takes rocephin iv and not im.  Offered to call and see if drs would order iv.  She said it was ok and that she would take shot.  After giving pt shot, she hit bed and said \"they didn't listen to me, I take this iv\".  Advised pt would call and see if drs would order iv, along with baclofen and pain meds  "

## 2018-09-08 NOTE — ASSESSMENT & PLAN NOTE
Improving  -5d of right stabbing eye pain, with decreased peripheral vision, blurry vision, decreased appreciation of colors, extreme pain on EOM, no injection   -ESR/CRP non elevated (temporal arteritis less likely)  -prior admissions for similar eye pain, prv diagnosed with optic neuritis and treated with steroid eye drops with resolution per pt  -possible optic neuritis, neuromyelitis optica, MS (all treated with steroids)  -solumedrol iv 250mg q6h x3days, prednisone po 1 mg/kg x11day & 4 day taper for presumed optic neuritis treatment  -many labs pending  - MRI, patient has Interstim device for bowel stimulation to be addressed before MRI (Dr. Noyola - for possibility of turning it off for MRI); pending. Need pt to bring in remote to turn off her Interstim  -Neuro consulted, appreciate recommendations  -Awaiting Ab tests  -Pt refused MRI     Discharge: Pt to follow up outpt with Dr. Yousif. Pt to return to ED immediately if condition worsens. Prednisone taper

## 2018-09-08 NOTE — PROGRESS NOTES
Pt sleeping in bed.  Bed alarm is on.  Call light in reach.  No distress noted.  Bed in low position.  Will monitor

## 2018-09-08 NOTE — ASSESSMENT & PLAN NOTE
-patient on O2 at home until medicare stopped supplying, pt reports transient desats to 60% on rare occasion  -O2 protocol and continuous O2 monitoring    Discharge: Con't home O2

## 2018-09-08 NOTE — PROGRESS NOTES
Pt transported off unit via wheelchair with hospital escort. Pt belongings, chart and medications sent with patient.

## 2018-09-08 NOTE — PROGRESS NOTES
Skin assessment completed per protocol, assessment done per x 2 RNs. All bony prominences and pressure areas assessed/checked, no skin break down or skin tears or other skin break issues note or reported with assessment.   Did have some dry flaky calloused areas to posterior aspect of heels bilaterally.  Nursing staff to continue to assess and monitor skin routinely and as needed per protocol.

## 2018-09-08 NOTE — SENIOR ADMIT NOTE
Senior Admission Note    In summary: Kristin Balderrama is a 27 y.o. female with past medical history of unknown, attempting to obtain medical records; who presented to the ER with blurred vision and right eye pain. Patient relates having pain with movement, tears, irritation, dizziness, and light headedness. She makes mention that she was previously diagnosed with Optic Neuritis about a year ago, for which she was prescribed Steroid drops. She mentions these symptoms feel like those. She was supposed to follow up with Dr. Yousif but has not followed up with him. Patient is currently at [facility] after being discharged from [place], she reports having bilateral foot drop and a spinal stimulator for her colon, which she says is incompatible with MRI's. She mentions that the physician who operated on her is Dr. Noyola, and it is possible to turn off.       Physical Exam   Constitutional: She is oriented to person, place, and time. She appears well-developed and well-nourished.   HENT:   Head: Normocephalic and atraumatic.   Eyes: Conjunctivae and lids are normal. Right eye exhibits no chemosis and no discharge. Left eye exhibits no chemosis and no discharge. Right conjunctiva is not injected. Left conjunctiva is not injected. No scleral icterus. Right eye exhibits abnormal extraocular motion.   Neck: Neck supple.   Cardiovascular: Normal rate and regular rhythm.    Pulmonary/Chest: Effort normal and breath sounds normal.       Abdominal: Soft. Bowel sounds are normal.   Obese   Musculoskeletal:   Patient wearing braces bilaterally     Neurological: She is alert and oriented to person, place, and time.   Skin: Rash noted. There is erythema.            Assessment and plan in summary:    Optic neuritis??  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        For full plan, please see Intern note for details   Juan Reeder M.D.  PGY 2

## 2018-09-08 NOTE — PROGRESS NOTES
Internal Medicine Interval Note  Note Author: Glynn Paula M.D.     Name Kristin Balderrama     1990   Age/Sex 27 y.o. female   MRN [id number]   Code Status Full     After 5PM or if no immediate response to page, please call for cross-coverage  Attending/Team: Olga Lidia / Harish See Patient List for primary contact information  Call [phone] to page    1st Call - Day Intern (R1):   Chai 2nd Call - Day Sr. Resident (R2/R3):   Ryan         Reason for interval visit  (Principal Problem)   R eye pain      Interval Problem Daily Status Update  (24 hours, problem oriented, brief subjective history, new lab/imaging data pertinent to that problem)     Ms Balderrama is a 27 year old female with PMH of mitochondrial disorder with dropped foot, DM, Afib, chronic back pain s/p surgery, arthritis, hemiplegic migraines, asthma, TONYA, anxiety, depression, schizophrenia, presents with right eye pain.    Patient reports that she continues to have eye pain this morning, decreased perception of color, and blurry vision of her right eye.  This has not changed since admission the night before.  She has concerns in regards to the fact that her baclofen had not been ordered, and wanting better pain management of her right eye.    Patient's home medications ordered.    Tylenol/ibuprofen scheduled.  Pregabalin dosing increased.     Review of Systems   Constitutional: Negative for chills and fever.   HENT: Negative for ear pain and hearing loss.    Eyes: Positive for blurred vision and pain.   Respiratory: Negative for cough and shortness of breath.    Cardiovascular: Negative for chest pain and leg swelling.   Gastrointestinal: Negative for abdominal pain, diarrhea, nausea and vomiting.   Genitourinary: Negative for flank pain and hematuria.   Musculoskeletal: Negative for falls and joint pain.   Skin: Negative for itching and rash.   Neurological: Positive for dizziness. Negative for seizures and loss of  consciousness.   Endo/Heme/Allergies: Negative for polydipsia. Does not bruise/bleed easily.   Psychiatric/Behavioral: Negative for substance abuse. The patient has insomnia.        Disposition/Barriers to discharge:   To remain inpatient pending neurological evaluation.    Consultants/Specialty    PCP: Torres Brody M.D.      Quality Measures  Quality-Core Measures   Reviewed items::  EKG reviewed, Labs reviewed, Medications reviewed and Radiology images reviewed  Andrade catheter::  No Andrade  DVT prophylaxis pharmacological::  Enoxaparin (Lovenox)  Ulcer Prophylaxis::  Not indicated      Physical Exam       Vitals:    09/08/18 0105 09/08/18 0538 09/08/18 0746 09/08/18 1213   BP: 123/59 132/62 128/84 135/70   Pulse: 70 79 95 84   Resp: 18 18 19 17   Temp: 36.4 °C (97.5 °F) 36.6 °C (97.9 °F) 36.1 °C (96.9 °F) 36.2 °C (97.1 °F)   SpO2: 95% 94% 94% 93%   Weight:       Height:         Body mass index is 44.02 kg/m². Weight: 120 kg (264 lb 8.8 oz)  Oxygen Therapy:  Pulse Oximetry: 93 %, O2 (LPM): 0, O2 Delivery: None (Room Air)    Physical Exam   Constitutional: She is oriented to person, place, and time and well-developed, well-nourished, and in no distress.   HENT:   Head: Normocephalic and atraumatic.   Mouth/Throat: No oropharyngeal exudate.   Eyes: Pupils are equal, round, and reactive to light. EOM are normal. No scleral icterus.   Neck: Normal range of motion. Neck supple.   Cardiovascular: Normal rate and regular rhythm.  Exam reveals no gallop and no friction rub.    No murmur heard.  Pulmonary/Chest: Effort normal and breath sounds normal. No respiratory distress. She has no wheezes. She has no rales.   Abdominal: Soft. Bowel sounds are normal. She exhibits no distension and no mass. There is no tenderness. There is no rebound and no guarding.   Musculoskeletal: Normal range of motion. She exhibits no deformity.   Lymphadenopathy:     She has no cervical adenopathy.   Neurological: She is alert and oriented to  person, place, and time.   Skin: Skin is warm and dry. She is not diaphoretic.   Psychiatric: Mood, memory, affect and judgment normal.         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 Noyola - for possibility of turning it off for MRI)  -consider neuro consult (attempted to call, call back pending)        Cellulitis of left breast- (present on admission)   Assessment & Plan    -patient on day 6/14 of ceftriaxone, was at SNF, from recent admission for cellulitis  -continue ceftriaxone        Type 2 diabetes mellitus without complication, without long-term current use of insulin (HCC)- (present on admission)   Assessment & Plan    -insulin sliding scale, full home medication records not received yet        Chronic respiratory failure with hypoxia, on home oxygen therapy (HCC)- (present on admission)   Assessment & Plan    -patient on O2 at home until medicare stopped supplying, pt reports transient desats to 60% on rare occasion  -O2 protocol and continuous O2 monitoring        TONYA (obstructive sleep apnea)- (present on admission)   Assessment & Plan    -patient on O2 at home until medicare stopped supplying  -O2 protocol and continuous O2 monitoring        Chronic pain syndrome- (present on admission)   Assessment & Plan    -continue home dose baclofen        Peripheral  neuropathy (CMS-HCC)- (present on admission)   Assessment & Plan    -Increased her pregabalin to 100 mg twice daily.        Depression- (present on admission)   Assessment & Plan    -continue home dose trazadone        Psychosis, schizophrenia, simple (HCC)- (present on admission)   Assessment & Plan    -continue home does geodon        Anxiety- (present on admission)   Assessment & Plan    -continue home dose fluoxetine

## 2018-09-09 LAB
GLUCOSE BLD-MCNC: 147 MG/DL (ref 65–99)
GLUCOSE BLD-MCNC: 168 MG/DL (ref 65–99)
GLUCOSE BLD-MCNC: 206 MG/DL (ref 65–99)
GLUCOSE BLD-MCNC: 208 MG/DL (ref 65–99)

## 2018-09-09 PROCEDURE — 82962 GLUCOSE BLOOD TEST: CPT | Mod: 91

## 2018-09-09 PROCEDURE — 700102 HCHG RX REV CODE 250 W/ 637 OVERRIDE(OP): Performed by: STUDENT IN AN ORGANIZED HEALTH CARE EDUCATION/TRAINING PROGRAM

## 2018-09-09 PROCEDURE — 700111 HCHG RX REV CODE 636 W/ 250 OVERRIDE (IP): Performed by: STUDENT IN AN ORGANIZED HEALTH CARE EDUCATION/TRAINING PROGRAM

## 2018-09-09 PROCEDURE — A9270 NON-COVERED ITEM OR SERVICE: HCPCS | Performed by: STUDENT IN AN ORGANIZED HEALTH CARE EDUCATION/TRAINING PROGRAM

## 2018-09-09 PROCEDURE — 665999 HH PPS REVENUE DEBIT

## 2018-09-09 PROCEDURE — 665998 HH PPS REVENUE CREDIT

## 2018-09-09 PROCEDURE — 700105 HCHG RX REV CODE 258: Performed by: STUDENT IN AN ORGANIZED HEALTH CARE EDUCATION/TRAINING PROGRAM

## 2018-09-09 PROCEDURE — 770006 HCHG ROOM/CARE - MED/SURG/GYN SEMI*

## 2018-09-09 PROCEDURE — 99233 SBSQ HOSP IP/OBS HIGH 50: CPT | Mod: GC | Performed by: INTERNAL MEDICINE

## 2018-09-09 RX ORDER — CALCIUM CARBONATE 500 MG/1
500 TABLET, CHEWABLE ORAL 2 TIMES DAILY PRN
Status: DISCONTINUED | OUTPATIENT
Start: 2018-09-09 | End: 2018-09-12 | Stop reason: HOSPADM

## 2018-09-09 RX ORDER — CALCIUM CARBONATE 500 MG/1
500 TABLET, CHEWABLE ORAL ONCE
Status: COMPLETED | OUTPATIENT
Start: 2018-09-09 | End: 2018-09-09

## 2018-09-09 RX ADMIN — ENOXAPARIN SODIUM 40 MG: 40 INJECTION SUBCUTANEOUS at 05:20

## 2018-09-09 RX ADMIN — INSULIN LISPRO 1 UNITS: 100 INJECTION, SOLUTION INTRAVENOUS; SUBCUTANEOUS at 20:32

## 2018-09-09 RX ADMIN — ACETAMINOPHEN 650 MG: 325 TABLET, FILM COATED ORAL at 11:46

## 2018-09-09 RX ADMIN — ZIPRASIDONE HCL 80 MG: 40 CAPSULE ORAL at 17:12

## 2018-09-09 RX ADMIN — SODIUM CHLORIDE 250 MG: 9 INJECTION, SOLUTION INTRAVENOUS at 11:44

## 2018-09-09 RX ADMIN — ANTACID TABLETS 500 MG: 500 TABLET, CHEWABLE ORAL at 08:03

## 2018-09-09 RX ADMIN — INSULIN LISPRO 2 UNITS: 100 INJECTION, SOLUTION INTRAVENOUS; SUBCUTANEOUS at 11:46

## 2018-09-09 RX ADMIN — SODIUM CHLORIDE 250 MG: 9 INJECTION, SOLUTION INTRAVENOUS at 06:49

## 2018-09-09 RX ADMIN — BUSPIRONE HYDROCHLORIDE 5 MG: 5 TABLET ORAL at 05:18

## 2018-09-09 RX ADMIN — IBUPROFEN 400 MG: 800 TABLET, FILM COATED ORAL at 17:12

## 2018-09-09 RX ADMIN — ASPIRIN 81 MG: 81 TABLET, COATED ORAL at 05:19

## 2018-09-09 RX ADMIN — INSULIN LISPRO 2 UNITS: 100 INJECTION, SOLUTION INTRAVENOUS; SUBCUTANEOUS at 17:22

## 2018-09-09 RX ADMIN — ACETAMINOPHEN 650 MG: 325 TABLET, FILM COATED ORAL at 05:19

## 2018-09-09 RX ADMIN — CEFTRIAXONE SODIUM 1 G: 1 INJECTION, POWDER, FOR SOLUTION INTRAMUSCULAR; INTRAVENOUS at 05:20

## 2018-09-09 RX ADMIN — PREGABALIN 100 MG: 100 CAPSULE ORAL at 17:17

## 2018-09-09 RX ADMIN — IBUPROFEN 400 MG: 800 TABLET, FILM COATED ORAL at 05:19

## 2018-09-09 RX ADMIN — TRAZODONE HYDROCHLORIDE 100 MG: 50 TABLET ORAL at 20:29

## 2018-09-09 RX ADMIN — SODIUM CHLORIDE 250 MG: 9 INJECTION, SOLUTION INTRAVENOUS at 17:17

## 2018-09-09 RX ADMIN — BACLOFEN 10 MG: 10 TABLET ORAL at 17:12

## 2018-09-09 RX ADMIN — ACETAMINOPHEN 650 MG: 325 TABLET, FILM COATED ORAL at 17:27

## 2018-09-09 RX ADMIN — TRAMADOL HYDROCHLORIDE 50 MG: 50 TABLET, COATED ORAL at 10:30

## 2018-09-09 RX ADMIN — FLUOXETINE 10 MG: 10 CAPSULE ORAL at 05:19

## 2018-09-09 RX ADMIN — IBUPROFEN 400 MG: 800 TABLET, FILM COATED ORAL at 11:46

## 2018-09-09 RX ADMIN — BUSPIRONE HYDROCHLORIDE 5 MG: 5 TABLET ORAL at 17:12

## 2018-09-09 RX ADMIN — BACLOFEN 10 MG: 10 TABLET ORAL at 05:17

## 2018-09-09 RX ADMIN — ZIPRASIDONE HCL 80 MG: 40 CAPSULE ORAL at 05:18

## 2018-09-09 RX ADMIN — PREGABALIN 100 MG: 100 CAPSULE ORAL at 05:20

## 2018-09-09 ASSESSMENT — ENCOUNTER SYMPTOMS
FLANK PAIN: 0
VOMITING: 0
DIZZINESS: 1
BRUISES/BLEEDS EASILY: 0
FALLS: 0
ABDOMINAL PAIN: 0
LOSS OF CONSCIOUSNESS: 0
CHILLS: 0
POLYDIPSIA: 0
DIARRHEA: 0
BLURRED VISION: 1
SEIZURES: 0
SHORTNESS OF BREATH: 0
FEVER: 0
EYE PAIN: 1
INSOMNIA: 1
NAUSEA: 0
COUGH: 0

## 2018-09-09 ASSESSMENT — PAIN SCALES - GENERAL
PAINLEVEL_OUTOF10: 5
PAINLEVEL_OUTOF10: 7
PAINLEVEL_OUTOF10: 1

## 2018-09-09 ASSESSMENT — LIFESTYLE VARIABLES: SUBSTANCE_ABUSE: 0

## 2018-09-09 NOTE — PROGRESS NOTES
Internal Medicine Interval Note  Note Author: Glynn Paula M.D.     Name Kristin Balderrama     1990   Age/Sex 27 y.o. female   MRN [id number]   Code Status Full     After 5PM or if no immediate response to page, please call for cross-coverage  Attending/Team: Olga Lidia / Harish See Patient List for primary contact information  Call [phone] to page    1st Call - Day Intern (R1):   Chai 2nd Call - Day Sr. Resident (R2/R3):   Ryan         Reason for interval visit  (Principal Problem)   R eye pain      Interval Problem Daily Status Update  (24 hours, problem oriented, brief subjective history, new lab/imaging data pertinent to that problem)     Ms Balderrama is a 27 year old female with PMH of mitochondrial disorder with dropped foot, DM, Afib, chronic back pain s/p surgery, arthritis, hemiplegic migraines, asthma, TONYA, anxiety, depression, schizophrenia, presents with right eye pain.    Patient reports that her eye pain, blurry vision, and decreased color perception have all mildly improved this morning.  She does not have any concerns, nor complaints this a.m.  However, she is anxious about the plan of discharge.    Patient requiring 6 more days of ceftriaxone IV.  To be set up with infusion center for the remainder of her antibiotic therapy once she is discharged.    Needs appointment with neuro-ophthalmology.    Tylenol/ibuprofen scheduled.  Pregabalin dosing increased. Tramadol added PRN.     Review of Systems   Constitutional: Negative for chills and fever.   HENT: Negative for ear pain and hearing loss.    Eyes: Positive for blurred vision and pain.   Respiratory: Negative for cough and shortness of breath.    Cardiovascular: Negative for chest pain and leg swelling.   Gastrointestinal: Negative for abdominal pain, diarrhea, nausea and vomiting.   Genitourinary: Negative for flank pain and hematuria.   Musculoskeletal: Negative for falls and joint pain.   Skin: Negative for  itching and rash.   Neurological: Positive for dizziness. Negative for seizures and loss of consciousness.   Endo/Heme/Allergies: Negative for polydipsia. Does not bruise/bleed easily.   Psychiatric/Behavioral: Negative for substance abuse. The patient has insomnia.        Disposition/Barriers to discharge:   To remain inpatient pending infusion center acceptance.    Consultants/Specialty    PCP: Torres Brody M.D.      Quality Measures  Quality-Core Measures   Reviewed items::  EKG reviewed, Labs reviewed, Medications reviewed and Radiology images reviewed  Andrade catheter::  No Andrade  DVT prophylaxis pharmacological::  Enoxaparin (Lovenox)  Ulcer Prophylaxis::  Not indicated      Physical Exam       Vitals:    09/08/18 1720 09/08/18 1925 09/09/18 0335 09/09/18 0914   BP: 126/75 123/45 120/43 125/62   Pulse: (!) 110 (!) 103 86 90   Resp: 18 16 13 (!) 22   Temp: 36.2 °C (97.2 °F) 36.3 °C (97.4 °F) 36.4 °C (97.6 °F) 36.2 °C (97.1 °F)   SpO2: 92% 94% 93% 100%   Weight:  (!) 125.2 kg (276 lb 0.3 oz)     Height:         Body mass index is 45.93 kg/m². Weight: (!) 125.2 kg (276 lb 0.3 oz)  Oxygen Therapy:  Pulse Oximetry: 100 %, O2 (LPM): 0, O2 Delivery: None (Room Air)    Physical Exam   Constitutional: She is oriented to person, place, and time and well-developed, well-nourished, and in no distress.   HENT:   Head: Normocephalic and atraumatic.   Mouth/Throat: No oropharyngeal exudate.   Eyes: Pupils are equal, round, and reactive to light. EOM are normal. No scleral icterus.   Neck: Normal range of motion. Neck supple.   Cardiovascular: Normal rate and regular rhythm.  Exam reveals no gallop and no friction rub.    No murmur heard.  Pulmonary/Chest: Effort normal and breath sounds normal. No respiratory distress. She has no wheezes. She has no rales.   Abdominal: Soft. Bowel sounds are normal. She exhibits no distension and no mass. There is no tenderness. There is no rebound and no guarding.   Musculoskeletal:  Normal range of motion. She exhibits no deformity.   Lymphadenopathy:     She has no cervical adenopathy.   Neurological: She is alert and oriented to person, place, and time.   Skin: Skin is warm and dry. She is not diaphoretic.   Psychiatric: Mood, memory, affect and judgment normal.         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 Noyola - for possibility of turning it off for MRI)  -consider neuro consult (attempted to call, call back pending)        Cellulitis of left breast- (present on admission)   Assessment & Plan    -patient on day 8/14 of ceftriaxone, was at SNF, from recent admission for cellulitis  -Ceftriaxone 1 g IV daily.  -Set patient up for infusion center so upon discharge she can receive her final doses of IV antibiotics.        Type 2 diabetes mellitus without complication, without long-term current use of insulin (HCC)- (present on admission)   Assessment & Plan    -insulin sliding scale, full home medication records not received yet        Chronic respiratory failure with hypoxia, on home oxygen therapy (HCC)- (present on admission)   Assessment & Plan    -patient on O2 at home until medicare stopped supplying, pt reports transient desats to 60% on rare occasion  -O2 protocol and continuous O2 monitoring        TONYA (obstructive sleep apnea)-  (present on admission)   Assessment & Plan    -patient on O2 at home until medicare stopped supplying  -O2 protocol and continuous O2 monitoring        Chronic pain syndrome- (present on admission)   Assessment & Plan    -Baclofen 10 mg p.o. twice daily.        Peripheral neuropathy (CMS-HCC)- (present on admission)   Assessment & Plan    -Increased her pregabalin to 100 mg twice daily.        Depression- (present on admission)   Assessment & Plan    -continue home dose trazadone        Psychosis, schizophrenia, simple (HCC)- (present on admission)   Assessment & Plan    -Ziprasidone 80 mg p.o. twice daily.        Anxiety- (present on admission)   Assessment & Plan    -Fluoxetine 10 mg p.o. daily.

## 2018-09-09 NOTE — PROGRESS NOTES
Report received from night SONYA Woo at 0700, care assumed, rounded on, is in bed resting soundly with eyes closed, resp even and nonlabored, shows no s/s of having any distress or discomfort at this time. Side rails up x 2, call light in reach.

## 2018-09-09 NOTE — CARE PLAN
Problem: Safety  Goal: Will remain free from injury    Intervention: Provide assistance with mobility  Room free of clutter, clear path to bathroom. Bed locked in low position, side rails up x2, call light in reach. Threaded socks on. Educated to call light, safety awareness, and importance of calling for assistance and waiting for assistance.  Receptive to teaching and education, states understanding and is cooperative and compliant.      Problem: Knowledge Deficit  Goal: Knowledge of disease process/condition, treatment plan, diagnostic tests, and medications will improve    Intervention: Assess knowledge level of disease process/condition, treatment plan, diagnostic tests, and medications  Nursing to educate, inform and ensure that pt has understanding related to admitting dx in regards to process/condition, s/s, treatment plan, tests and medications. Provide pt with appropriate explanations, education and teaching in a language he/she is fluent in and ensure all devices needed or required for understanding and adequate communication is in place to ensure good communication and understanding.   Stated understanding of plan care and agreeable with plan of care.

## 2018-09-09 NOTE — PROGRESS NOTES
Received handoff report from SONYA Lyons and assumed pt care at 1900.  Pt resting in bed. Assessment complete. Labs reviewed. Patient and RN discussed plan of care and questions were answered. Bed in lowest and locked position. Call light and personal belongings within reach.

## 2018-09-09 NOTE — DISCHARGE SUMMARY
"        Internal Medicine Discharge Summary  Note Author: Bolivar Davis M.D.       Admit Date:  9/7/2018       Discharge Date:   09/12/18    Service:   R Internal Medicine Green Team  Attending Physician(s):   Dr Rios       Senior Resident(s):   Ryan  Edmond Resident(s):   Chai  PCP: Torres Brody M.D.      Primary Diagnosis:   Acute right eye pain    Secondary Diagnoses:                Principal Problem:    Acute right eye pain POA: Unknown  Active Problems:    Type 2 diabetes mellitus without complication, without long-term current use of insulin (HCC) POA: Yes    Cellulitis of left breast POA: Yes    Anxiety POA: Yes    Psychosis, schizophrenia, simple (HCC) (Chronic) POA: Yes    Depression POA: Yes    Peripheral neuropathy (CMS-HCC) (Chronic) POA: Yes    Chronic pain syndrome (Chronic) POA: Yes    TONYA (obstructive sleep apnea) POA: Yes    Chronic respiratory failure with hypoxia, on home oxygen therapy (HCC) POA: Yes  Resolved Problems:    * No resolved hospital problems. *      Hospital Summary (Brief Narrative):        Pt is a 27 year old female with a past medical history of mitochondrial disorder with dropped foot, DM2, Afib, chronic back pain s/p surgery, arthritis, hemiplegic migraines, asthma, TONYA, anxiety, depression, schizophrenia, that presents with right eye pain. Her right eye has been painful with a sharp \"stabbing\" sensation progressive over the last 5 days, her vision has become increasingly blurry and she has been less able to appreciate colors, exacerbated on movement and no relief with tylenol. Per patient, she had two past hospital visits for optic neuritis around June/July and last September which was treated with steroids and resolved. She recently was admitted at [facility] for left breast cellulitis and is currently on day 6 of 14 for ceftriaxone therapy was previously at [facility]. She has an Interstim device implanted in her back for bowel incontinence.   In the ED she " was continued on C3 and started on high dose IV steroids. She was then admitted for further management of her conditions.   On the floor pt responded well to her steroid therapy and antibiotics, her eye pain and vision changes improved considerably. Neurology was consulted who recommended Lyme's ab titer, ESR, CRP, AI, dsDNA, ANCA panel, NMO and anti MOG antibodies. ESR and CRP were within normal limits and the other labs remain pending. We attempted to acquire MRI however patient contacted Dr Noyola, who placed the device, and he stated it was unacquirable, we offered to investigate this further however patient refused and requested to be discharged as her symptoms had improved considerably. During her stay she finished her course of Ceftriaxone for her breast cellulitis which appeared resolved. She was then discharged on high dose steroids with plan to taper. She was instructed to follow up with her PCP within 1 week of discharge and neurology and neuro-ophthal in a timely fashion.       Patient /Hospital Summary (Details -- Problem Oriented) :          Cellulitis of left breast   Assessment & Plan    -patient on day 6/14 of ceftriaxone, was at SNF, from recent admission for cellulitis  -continue ceftriaxone        Type 2 diabetes mellitus without complication, without long-term current use of insulin ([facility])   Assessment & Plan    -insulin sliding scale, full home medication records not received yet        * Acute right eye pain   Assessment & Plan    -5d of right stabbing eye pain, with decreased peripheral vision, blurry vision, decreased appreciation of colors, extreme pain on EOM, no injection   -ESR/CRP non elevated (temporal arteritis less likely)  -prior admissions for similar eye pain, prv diagnosed with optic neuritis and treated with steroid eye drops with resolution per pt  -possible optic neuritis, neuromyelitis optica, MS (all treated with steroids)  -solumedrol iv 250mg q6h x3days, prednisone po  1 mg/kg x11day & 4 day taper for presumed optic neuritis treatment  -consider MRI, patient has Interstim device for bowel stimulation to be addressed before MRI (Dr. Noyola - for possibility of turning it off for MRI)  -consider neuro consult (attempted to call, call back pending)        Chronic respiratory failure with hypoxia, on home oxygen therapy (HCC)   Assessment & Plan    -patient on O2 at home until medicare stopped supplying, pt reports transient desats to 60% on rare occasion  -O2 protocol and continuous O2 monitoring        TONYA (obstructive sleep apnea)   Assessment & Plan    -patient on O2 at home until medicare stopped supplying  -O2 protocol and continuous O2 monitoring        Chronic pain syndrome   Assessment & Plan    -continue home dose baclofen        Peripheral neuropathy (CMS-HCC)   Assessment & Plan    -Increased her pregabalin to 100 mg twice daily.        Depression   Assessment & Plan    -continue home dose trazadone        Psychosis, schizophrenia, simple (HCC)   Assessment & Plan    -continue home does geodon        Anxiety   Assessment & Plan    -continue home dose fluoxetine            Consultants:     None    Procedures:        None    Imaging/ Testing:      CT-HEAD W/O   Final Result      Head CT without contrast within normal limits. No evidence of acute cerebral infarction, hemorrhage or mass lesion. If there is clinical concern for optic neuritis then orbital MRI without and with contrast would be the study of choice.          Discharge Medications:         Medication Reconciliation: Completed       Medication List      ASK your doctor about these medications      Instructions   aspirin EC 81 MG Tbec  Commonly known as:  ECOTRIN   Take 1 Tab by mouth every day.  Dose:  81 mg     baclofen 10 MG Tabs  Commonly known as:  LIORESAL   Take 10 mg by mouth 2 times a day.  Dose:  10 mg     busPIRone 10 MG Tabs  Commonly known as:  BUSPAR   Take 0.5 Tabs by mouth 2 times a day.  Dose:  5  mg     cefTRIAXone 1 GM Solr  Commonly known as:  ROCEPHIN   1 g by Intramuscular route every day.  Dose:  1 g     cholestyramine 4 g packet  Commonly known as:  QUESTRAN   Take 1 Packet by mouth every day.  Dose:  1 Packet     cholestyramine 4 GM Pack  Commonly known as:  QUESTRAN,PREVALITE   Take 4 g by mouth every morning.  Dose:  4 g     [employer] VITAMIN B-12 500 MCG tablet  Generic drug:  cyanocobalamin   Take 500 mcg by mouth every day.  Dose:  500 mcg     FLUoxetine 10 MG Caps  Commonly known as:  PROZAC   TAKE ONE CAPSULE BY MOUTH ONCE A DAY     furosemide 40 MG Tabs  Commonly known as:  LASIX   Take 40 mg by mouth every day.  Dose:  40 mg     ivabradine 5 MG Tabs tablet  Commonly known as:  CORLANOR   Doctor's comments:  rx called  Take 1 Tab by mouth 2 times a day, with meals.  Dose:  5 mg     lidocaine 5 % Ptch  Commonly known as:  LIDODERM   Apply 1 Patch to skin as directed every 24 hours. Apply to left hip  Dose:  1 Patch     melatonin 3 MG Tabs   Take 6 mg by mouth every bedtime.  Dose:  6 mg     nystatin 139218 UNIT/GM Crea topical cream  Commonly known as:  MYCOSTATIN   Apply 0.0001 g to affected area(s) 2 times a day.  Dose:  1 Units     pregabalin 75 MG Caps  Commonly known as:  LYRICA   Take 75 mg by mouth 2 times a day.  Dose:  75 mg     SITagliptin 25 MG Tabs  Commonly known as:  JANUVIA   Take 25 mg by mouth every day.  Dose:  25 mg     sodium bicarbonate 650 MG Tabs  Commonly known as:  SODIUM BICARBONATE   Take 650 mg by mouth 2 times a day.  Dose:  650 mg     traZODone 100 MG Tabs  Commonly known as:  DESYREL   Take 100 mg by mouth every bedtime.  Dose:  100 mg     triamcinolone acetonide 0.1 % Crea  Commonly known as:  KENALOG   Apply 1 g to affected area(s) 2 times a day. To left breast And LUE  Dose:  1 g     VICTOZA 18 MG/3ML Sopn injection  Generic drug:  liraglutide   Inject 1.8 mg as instructed every day.  Dose:  1.8 mg     vitamin D 1000 UNIT Tabs  Commonly known as:  cholecalciferol    Take 1,000 Units by mouth every day.  Dose:  1000 Units     ziprasidone 80 MG Caps  Commonly known as:  GEODON   Take 80 mg by mouth 2 Times a Day.  Dose:  80 mg             Disposition:   Discharge home    Diet:   Regular     Activity:   WBAT    Instructions:       The patient was instructed to return to the ER in the event of worsening symptoms. I have counseled the patient on the importance of compliance and the patient has agreed to proceed with all medical recommendations and follow up plan indicated above.   The patient understands that all medications come with benefits and risks. Risks may include permanent injury or death and these risks can be minimized with close reassessment and monitoring.        Primary Care Provider:    Torres Brody M.D.  Discharge summary faxed to primary care provider:  Deferred  Copy of discharge summary given to the patient: Deferred      Follow up appointment details :      Follow up with PCP in 1 week of discharge  Follow up with neurophth in 1 week of discharge  Continue steroid taper and abx    Pending Studies:        None    Time spent on discharge day patient visit, preparing discharge paperwork and arranging for patient follow up.    Summary of follow up issues:   Follow up with PCP in 1 week of discharge  Follow up with neurophth in 1 week of discharge  Continue steroid taper and abx    Discharge Time (Minutes) :    50  Hospital Course Type:  Inpatient Stay >2 midnights      Condition on Discharge    ______________________________________________________________________    Interval history/exam for day of discharge:    Pt feeling improved today, vision and eye pain improved considerably on steroids. Will DC home today with plan for timely f/u with PCP and neuro-ophthal    Vitals:    09/08/18 1213 09/08/18 1720 09/08/18 1925 09/09/18 0335   BP: 135/70 126/75 123/45 120/43   Pulse: 84 (!) 110 (!) 103 86   Resp: 17 18 16 13   Temp: 36.2 °C (97.1 °F) 36.2 °C (97.2 °F) 36.3  °C (97.4 °F) 36.4 °C (97.6 °F)   SpO2: 93% 92% 94% 93%   Weight:   (!) 125.2 kg (276 lb 0.3 oz)    Height:         Weight/BMI: Body mass index is 45.93 kg/m².  Pulse Oximetry: 93 %, O2 (LPM): 0, O2 Delivery: None (Room Air)    General: obese  CVS: RRR, no mumur   PULM: CTAB    Most Recent Labs:    Lab Results   Component Value Date/Time    WBC 8.1 09/07/2018 06:52 PM    WBC 6.1 07/20/2010 11:00 AM    RBC 4.46 09/07/2018 06:52 PM    RBC 4.38 07/20/2010 11:00 AM    HEMOGLOBIN 13.5 09/07/2018 06:52 PM    HEMATOCRIT 40.4 09/07/2018 06:52 PM    MCV 90.6 09/07/2018 06:52 PM    MCV 93 07/20/2010 11:00 AM    MCH 30.3 09/07/2018 06:52 PM    MCH 30.1 07/20/2010 11:00 AM    MCHC 33.4 (L) 09/07/2018 06:52 PM    MPV 12.1 09/07/2018 06:52 PM    NEUTSPOLYS 50.30 09/07/2018 06:52 PM    LYMPHOCYTES 37.40 09/07/2018 06:52 PM    MONOCYTES 7.10 09/07/2018 06:52 PM    EOSINOPHILS 4.40 09/07/2018 06:52 PM    BASOPHILS 0.60 09/07/2018 06:52 PM      Lab Results   Component Value Date/Time    SODIUM 139 09/07/2018 06:52 PM    POTASSIUM 3.8 09/07/2018 06:52 PM    CHLORIDE 106 09/07/2018 06:52 PM    CO2 22 09/07/2018 06:52 PM    GLUCOSE 84 09/07/2018 06:52 PM    BUN 11 09/07/2018 06:52 PM    CREATININE 0.71 09/07/2018 06:52 PM    CREATININE 0.75 (L) 07/20/2010 11:00 AM    BUNCREATRAT 19 07/20/2010 11:00 AM    GLOMRATE >59 07/20/2010 11:00 AM      Lab Results   Component Value Date/Time    ALTSGPT 42 09/07/2018 06:52 PM    ASTSGOT 20 09/07/2018 06:52 PM    ALKPHOSPHAT 58 09/07/2018 06:52 PM    TBILIRUBIN 0.6 09/07/2018 06:52 PM    DBILIRUBIN <0.1 03/25/2013 01:50 PM    LIPASE 38 04/05/2017 09:43 AM    ALBUMIN 4.4 09/07/2018 06:52 PM    ALBUMIN 4.65 05/18/2016 04:49 PM    GLOBULIN 2.3 09/07/2018 06:52 PM    INR 1.02 07/14/2018 11:54 AM     Lab Results   Component Value Date/Time    PROTHROMBTM 13.1 07/14/2018 11:54 AM    INR 1.02 07/14/2018 11:54 AM

## 2018-09-09 NOTE — DIETARY
"Nutrition services: Day 1 of admit.  Kristin Balderrama is a 27 y.o. female with admitting DX of eye pain.    Pt seen for high BMI and wt loss per admit screening.     Pt reports poor appetite/wt loss over past month, UBW is 270#. Pt states appetite is now improving and her wt has been trending up. Pt states she is eating ~50% of meals and would like to try small, frequent meals to help increase intake.       Assessment:  Height: 165.1 cm (5' 5\")  Weight: (!) 125.2 kg (276 lb 0.3 oz)  Body mass index is 45.93 kg/m².   Diet/Intake: Diabetic.     Evaluation:   1. Pt's PO intake improving and now back to UBW.   2. Pt with hx of Type II DM, FSBS: 147-242 (9/8-9/9). Pt on Lantus and SSI. Elevated glucose may be related to steroids, pt on solumedrol.   3. Pt with BMI >40.Weight loss counseling not appropriate in acute care setting.     Recommendations/Plan:  1. Will add snacks three times daily to help optimize PO intake.   2. Encourage PO Intake.   3. Document intake of all PO  as % taken in ADL's to provide interdisciplinary communication across all shifts.   4. Monitor weight.  5. Nutrition rep will continue to see patient for ongoing meal and snack preferences.    6. Adjust insulin PRN for tight glucose control.  4. Referral to outpatient nutrition services for weight management after D/C.     RD available PRN.         "

## 2018-09-09 NOTE — CARE PLAN
Problem: Communication  Goal: The ability to communicate needs accurately and effectively will improve  Outcome: PROGRESSING AS EXPECTED  Pt is able to verbalize need and uses call light appropriately.    Problem: Venous Thromboembolism (VTW)/Deep Vein Thrombosis (DVT) Prevention:  Goal: Patient will participate in Venous Thrombosis (VTE)/Deep Vein Thrombosis (DVT)Prevention Measures  Outcome: PROGRESSING AS EXPECTED  Pt receiving Lovenox.

## 2018-09-10 LAB
GLUCOSE BLD-MCNC: 133 MG/DL (ref 65–99)
GLUCOSE BLD-MCNC: 149 MG/DL (ref 65–99)
GLUCOSE BLD-MCNC: 207 MG/DL (ref 65–99)
GLUCOSE BLD-MCNC: 232 MG/DL (ref 65–99)

## 2018-09-10 PROCEDURE — 700102 HCHG RX REV CODE 250 W/ 637 OVERRIDE(OP): Performed by: STUDENT IN AN ORGANIZED HEALTH CARE EDUCATION/TRAINING PROGRAM

## 2018-09-10 PROCEDURE — 665998 HH PPS REVENUE CREDIT

## 2018-09-10 PROCEDURE — 99233 SBSQ HOSP IP/OBS HIGH 50: CPT | Mod: GC | Performed by: INTERNAL MEDICINE

## 2018-09-10 PROCEDURE — 665999 HH PPS REVENUE DEBIT

## 2018-09-10 PROCEDURE — 700105 HCHG RX REV CODE 258: Performed by: STUDENT IN AN ORGANIZED HEALTH CARE EDUCATION/TRAINING PROGRAM

## 2018-09-10 PROCEDURE — 700111 HCHG RX REV CODE 636 W/ 250 OVERRIDE (IP): Performed by: STUDENT IN AN ORGANIZED HEALTH CARE EDUCATION/TRAINING PROGRAM

## 2018-09-10 PROCEDURE — 770006 HCHG ROOM/CARE - MED/SURG/GYN SEMI*

## 2018-09-10 PROCEDURE — A9270 NON-COVERED ITEM OR SERVICE: HCPCS | Performed by: STUDENT IN AN ORGANIZED HEALTH CARE EDUCATION/TRAINING PROGRAM

## 2018-09-10 PROCEDURE — 82962 GLUCOSE BLOOD TEST: CPT

## 2018-09-10 RX ORDER — LACTOBACILLUS RHAMNOSUS GG 10B CELL
1 CAPSULE ORAL
Status: DISCONTINUED | OUTPATIENT
Start: 2018-09-11 | End: 2018-09-12 | Stop reason: HOSPADM

## 2018-09-10 RX ADMIN — BUSPIRONE HYDROCHLORIDE 5 MG: 5 TABLET ORAL at 17:24

## 2018-09-10 RX ADMIN — ACETAMINOPHEN 650 MG: 325 TABLET, FILM COATED ORAL at 12:09

## 2018-09-10 RX ADMIN — SODIUM CHLORIDE 250 MG: 9 INJECTION, SOLUTION INTRAVENOUS at 05:59

## 2018-09-10 RX ADMIN — ZIPRASIDONE HCL 80 MG: 40 CAPSULE ORAL at 17:23

## 2018-09-10 RX ADMIN — PREGABALIN 100 MG: 100 CAPSULE ORAL at 17:23

## 2018-09-10 RX ADMIN — BACLOFEN 10 MG: 10 TABLET ORAL at 06:01

## 2018-09-10 RX ADMIN — BUSPIRONE HYDROCHLORIDE 5 MG: 5 TABLET ORAL at 05:58

## 2018-09-10 RX ADMIN — ACETAMINOPHEN 650 MG: 325 TABLET, FILM COATED ORAL at 17:23

## 2018-09-10 RX ADMIN — IBUPROFEN 400 MG: 800 TABLET, FILM COATED ORAL at 05:58

## 2018-09-10 RX ADMIN — IBUPROFEN 400 MG: 800 TABLET, FILM COATED ORAL at 00:14

## 2018-09-10 RX ADMIN — SODIUM CHLORIDE 250 MG: 9 INJECTION, SOLUTION INTRAVENOUS at 00:15

## 2018-09-10 RX ADMIN — ASPIRIN 81 MG: 81 TABLET, COATED ORAL at 05:59

## 2018-09-10 RX ADMIN — ENOXAPARIN SODIUM 40 MG: 40 INJECTION SUBCUTANEOUS at 05:59

## 2018-09-10 RX ADMIN — TRAMADOL HYDROCHLORIDE 50 MG: 50 TABLET, COATED ORAL at 08:48

## 2018-09-10 RX ADMIN — TRAZODONE HYDROCHLORIDE 100 MG: 50 TABLET ORAL at 20:06

## 2018-09-10 RX ADMIN — BACLOFEN 10 MG: 10 TABLET ORAL at 17:23

## 2018-09-10 RX ADMIN — IBUPROFEN 400 MG: 800 TABLET, FILM COATED ORAL at 12:09

## 2018-09-10 RX ADMIN — SODIUM CHLORIDE 250 MG: 9 INJECTION, SOLUTION INTRAVENOUS at 17:24

## 2018-09-10 RX ADMIN — ZIPRASIDONE HCL 80 MG: 40 CAPSULE ORAL at 05:59

## 2018-09-10 RX ADMIN — IBUPROFEN 400 MG: 800 TABLET, FILM COATED ORAL at 17:23

## 2018-09-10 RX ADMIN — INSULIN LISPRO 2 UNITS: 100 INJECTION, SOLUTION INTRAVENOUS; SUBCUTANEOUS at 20:06

## 2018-09-10 RX ADMIN — INSULIN LISPRO 2 UNITS: 100 INJECTION, SOLUTION INTRAVENOUS; SUBCUTANEOUS at 12:10

## 2018-09-10 RX ADMIN — ACETAMINOPHEN 650 MG: 325 TABLET, FILM COATED ORAL at 05:57

## 2018-09-10 RX ADMIN — CEFTRIAXONE SODIUM 1 G: 1 INJECTION, POWDER, FOR SOLUTION INTRAMUSCULAR; INTRAVENOUS at 07:08

## 2018-09-10 RX ADMIN — ACETAMINOPHEN 650 MG: 325 TABLET, FILM COATED ORAL at 00:14

## 2018-09-10 RX ADMIN — PREGABALIN 100 MG: 100 CAPSULE ORAL at 05:59

## 2018-09-10 RX ADMIN — FLUOXETINE 10 MG: 10 CAPSULE ORAL at 06:00

## 2018-09-10 RX ADMIN — SODIUM CHLORIDE 250 MG: 9 INJECTION, SOLUTION INTRAVENOUS at 12:08

## 2018-09-10 ASSESSMENT — ENCOUNTER SYMPTOMS
INSOMNIA: 1
POLYDIPSIA: 0
LOSS OF CONSCIOUSNESS: 0
VOMITING: 0
DIZZINESS: 1
BRUISES/BLEEDS EASILY: 0
SEIZURES: 0
EYE PAIN: 1
FLANK PAIN: 0
FALLS: 0
CHILLS: 0
ABDOMINAL PAIN: 0
FEVER: 0
BLURRED VISION: 1
NAUSEA: 0
COUGH: 0
SHORTNESS OF BREATH: 0
DIARRHEA: 0

## 2018-09-10 ASSESSMENT — PAIN SCALES - GENERAL
PAINLEVEL_OUTOF10: 0
PAINLEVEL_OUTOF10: 8

## 2018-09-10 ASSESSMENT — LIFESTYLE VARIABLES: SUBSTANCE_ABUSE: 0

## 2018-09-10 NOTE — CARE PLAN
Problem: Infection  Goal: Will remain free from infection  Outcome: PROGRESSING AS EXPECTED  Patient remains afebrile. WBC within normal limits. No redness or skin issues noted.     Problem: Venous Thromboembolism (VTW)/Deep Vein Thrombosis (DVT) Prevention:  Goal: Patient will participate in Venous Thrombosis (VTE)/Deep Vein Thrombosis (DVT)Prevention Measures  Outcome: PROGRESSING AS EXPECTED  Patient participating in VTE prophylaxis. No s/s of abnormal bruising or bleeding or DVT. Educated on lovenox and encouraged patient to ambulate.

## 2018-09-10 NOTE — PROGRESS NOTES
Assumed patient care at 1900. Bedside report from day nurse received. Patient in bed alert and oriented x4. Patient states IV was not working any longer and concerned about placement. Nurse replaced during shift in RT forearm. Tolerated well. Continue IV therapy. Patient assisted to toilet tolerated well. SBA steady ambulating. Patient denies any current pain at this time. Patient with call light in reach, bed locked and lowest position, patient calls approprietly. Denies any further questions or concerns at this time.

## 2018-09-10 NOTE — CARE PLAN
Problem: Pain Management  Goal: Pain level will decrease to patient's comfort goal    Intervention: Follow pain managment plan developed in collaboration with patient and Interdisciplinary Team  Medicate and administer medications as ordered, scheduled, indicated, requested, or needed. Round on routinely and as needed for pain/discomfort assessment. Ensure pain/discomfort is managed and controlled this shift, provide and educate to other intervention techniques.      Problem: Safety  Goal: Will remain free from injury    Intervention: Provide assistance with mobility  Room free of clutter, clear path to bathroom. Bed locked in low position, side rails up x2, call light in reach. Threaded socks on. Educated to call light, safety awareness, and importance of calling for assistance and waiting for assistance.

## 2018-09-11 LAB
ALBUMIN SERPL BCP-MCNC: 3.3 G/DL (ref 3.2–4.9)
ALBUMIN/GLOB SERPL: 1.5 G/DL
ALP SERPL-CCNC: 44 U/L (ref 30–99)
ALT SERPL-CCNC: 34 U/L (ref 2–50)
ANION GAP SERPL CALC-SCNC: 6 MMOL/L (ref 0–11.9)
AST SERPL-CCNC: 13 U/L (ref 12–45)
BILIRUB SERPL-MCNC: 0.3 MG/DL (ref 0.1–1.5)
BUN SERPL-MCNC: 23 MG/DL (ref 8–22)
CALCIUM SERPL-MCNC: 7.9 MG/DL (ref 8.5–10.5)
CHLORIDE SERPL-SCNC: 107 MMOL/L (ref 96–112)
CO2 SERPL-SCNC: 20 MMOL/L (ref 20–33)
CREAT SERPL-MCNC: 0.75 MG/DL (ref 0.5–1.4)
GLOBULIN SER CALC-MCNC: 2.2 G/DL (ref 1.9–3.5)
GLUCOSE BLD-MCNC: 124 MG/DL (ref 65–99)
GLUCOSE BLD-MCNC: 163 MG/DL (ref 65–99)
GLUCOSE BLD-MCNC: 198 MG/DL (ref 65–99)
GLUCOSE BLD-MCNC: 205 MG/DL (ref 65–99)
GLUCOSE SERPL-MCNC: 206 MG/DL (ref 65–99)
POTASSIUM SERPL-SCNC: 3.8 MMOL/L (ref 3.6–5.5)
PROT SERPL-MCNC: 5.5 G/DL (ref 6–8.2)
SODIUM SERPL-SCNC: 133 MMOL/L (ref 135–145)

## 2018-09-11 PROCEDURE — 86038 ANTINUCLEAR ANTIBODIES: CPT

## 2018-09-11 PROCEDURE — 700111 HCHG RX REV CODE 636 W/ 250 OVERRIDE (IP): Performed by: STUDENT IN AN ORGANIZED HEALTH CARE EDUCATION/TRAINING PROGRAM

## 2018-09-11 PROCEDURE — 86225 DNA ANTIBODY NATIVE: CPT

## 2018-09-11 PROCEDURE — 36415 COLL VENOUS BLD VENIPUNCTURE: CPT

## 2018-09-11 PROCEDURE — A9270 NON-COVERED ITEM OR SERVICE: HCPCS | Performed by: STUDENT IN AN ORGANIZED HEALTH CARE EDUCATION/TRAINING PROGRAM

## 2018-09-11 PROCEDURE — 700105 HCHG RX REV CODE 258: Performed by: STUDENT IN AN ORGANIZED HEALTH CARE EDUCATION/TRAINING PROGRAM

## 2018-09-11 PROCEDURE — 700102 HCHG RX REV CODE 250 W/ 637 OVERRIDE(OP): Performed by: STUDENT IN AN ORGANIZED HEALTH CARE EDUCATION/TRAINING PROGRAM

## 2018-09-11 PROCEDURE — 700102 HCHG RX REV CODE 250 W/ 637 OVERRIDE(OP): Performed by: PHYSICAL MEDICINE & REHABILITATION

## 2018-09-11 PROCEDURE — 99232 SBSQ HOSP IP/OBS MODERATE 35: CPT | Mod: GC | Performed by: INTERNAL MEDICINE

## 2018-09-11 PROCEDURE — 86255 FLUORESCENT ANTIBODY SCREEN: CPT

## 2018-09-11 PROCEDURE — 86618 LYME DISEASE ANTIBODY: CPT

## 2018-09-11 PROCEDURE — 700102 HCHG RX REV CODE 250 W/ 637 OVERRIDE(OP): Mod: JG | Performed by: STUDENT IN AN ORGANIZED HEALTH CARE EDUCATION/TRAINING PROGRAM

## 2018-09-11 PROCEDURE — A9270 NON-COVERED ITEM OR SERVICE: HCPCS | Performed by: PHYSICAL MEDICINE & REHABILITATION

## 2018-09-11 PROCEDURE — 82962 GLUCOSE BLOOD TEST: CPT

## 2018-09-11 PROCEDURE — 80053 COMPREHEN METABOLIC PANEL: CPT

## 2018-09-11 PROCEDURE — 665998 HH PPS REVENUE CREDIT

## 2018-09-11 PROCEDURE — 770006 HCHG ROOM/CARE - MED/SURG/GYN SEMI*

## 2018-09-11 PROCEDURE — 83516 IMMUNOASSAY NONANTIBODY: CPT

## 2018-09-11 PROCEDURE — 665999 HH PPS REVENUE DEBIT

## 2018-09-11 RX ADMIN — TRAMADOL HYDROCHLORIDE 50 MG: 50 TABLET, COATED ORAL at 19:38

## 2018-09-11 RX ADMIN — ENOXAPARIN SODIUM 40 MG: 40 INJECTION SUBCUTANEOUS at 04:55

## 2018-09-11 RX ADMIN — SODIUM CHLORIDE 250 MG: 9 INJECTION, SOLUTION INTRAVENOUS at 12:52

## 2018-09-11 RX ADMIN — BACLOFEN 10 MG: 10 TABLET ORAL at 17:20

## 2018-09-11 RX ADMIN — ANTACID TABLETS 500 MG: 500 TABLET, CHEWABLE ORAL at 16:11

## 2018-09-11 RX ADMIN — ZIPRASIDONE HCL 80 MG: 40 CAPSULE ORAL at 17:20

## 2018-09-11 RX ADMIN — PREGABALIN 100 MG: 100 CAPSULE ORAL at 04:54

## 2018-09-11 RX ADMIN — FLUOXETINE 10 MG: 10 CAPSULE ORAL at 04:53

## 2018-09-11 RX ADMIN — IBUPROFEN 400 MG: 800 TABLET, FILM COATED ORAL at 00:13

## 2018-09-11 RX ADMIN — BUSPIRONE HYDROCHLORIDE 5 MG: 5 TABLET ORAL at 17:21

## 2018-09-11 RX ADMIN — INSULIN LISPRO 1 UNITS: 100 INJECTION, SOLUTION INTRAVENOUS; SUBCUTANEOUS at 17:27

## 2018-09-11 RX ADMIN — TRAMADOL HYDROCHLORIDE 50 MG: 50 TABLET, COATED ORAL at 09:22

## 2018-09-11 RX ADMIN — TRAZODONE HYDROCHLORIDE 100 MG: 50 TABLET ORAL at 19:38

## 2018-09-11 RX ADMIN — INSULIN LISPRO 1 UNITS: 100 INJECTION, SOLUTION INTRAVENOUS; SUBCUTANEOUS at 12:36

## 2018-09-11 RX ADMIN — ASPIRIN 81 MG: 81 TABLET, COATED ORAL at 04:53

## 2018-09-11 RX ADMIN — Medication 1 CAPSULE: at 08:51

## 2018-09-11 RX ADMIN — BACLOFEN 10 MG: 10 TABLET ORAL at 04:53

## 2018-09-11 RX ADMIN — IBUPROFEN 400 MG: 800 TABLET, FILM COATED ORAL at 12:49

## 2018-09-11 RX ADMIN — SODIUM CHLORIDE 250 MG: 9 INJECTION, SOLUTION INTRAVENOUS at 00:00

## 2018-09-11 RX ADMIN — ACETAMINOPHEN 650 MG: 325 TABLET, FILM COATED ORAL at 12:49

## 2018-09-11 RX ADMIN — TRAMADOL HYDROCHLORIDE 50 MG: 50 TABLET, COATED ORAL at 01:34

## 2018-09-11 RX ADMIN — PREGABALIN 100 MG: 100 CAPSULE ORAL at 17:21

## 2018-09-11 RX ADMIN — SODIUM CHLORIDE 250 MG: 9 INJECTION, SOLUTION INTRAVENOUS at 19:37

## 2018-09-11 RX ADMIN — SODIUM CHLORIDE 250 MG: 9 INJECTION, SOLUTION INTRAVENOUS at 06:25

## 2018-09-11 RX ADMIN — ZIPRASIDONE HCL 80 MG: 40 CAPSULE ORAL at 04:52

## 2018-09-11 RX ADMIN — CEFTRIAXONE SODIUM 1 G: 1 INJECTION, POWDER, FOR SOLUTION INTRAMUSCULAR; INTRAVENOUS at 04:54

## 2018-09-11 RX ADMIN — BUSPIRONE HYDROCHLORIDE 5 MG: 5 TABLET ORAL at 04:53

## 2018-09-11 RX ADMIN — ACETAMINOPHEN 650 MG: 325 TABLET, FILM COATED ORAL at 00:12

## 2018-09-11 RX ADMIN — INSULIN LISPRO 2 UNITS: 100 INJECTION, SOLUTION INTRAVENOUS; SUBCUTANEOUS at 19:44

## 2018-09-11 RX ADMIN — IBUPROFEN 400 MG: 800 TABLET, FILM COATED ORAL at 17:21

## 2018-09-11 RX ADMIN — ACETAMINOPHEN 650 MG: 325 TABLET, FILM COATED ORAL at 04:52

## 2018-09-11 RX ADMIN — ACETAMINOPHEN 650 MG: 325 TABLET, FILM COATED ORAL at 17:21

## 2018-09-11 RX ADMIN — IBUPROFEN 400 MG: 800 TABLET, FILM COATED ORAL at 04:53

## 2018-09-11 ASSESSMENT — ENCOUNTER SYMPTOMS
FALLS: 0
BRUISES/BLEEDS EASILY: 0
FLANK PAIN: 0
COUGH: 0
EYE PAIN: 1
CHILLS: 0
DIARRHEA: 0
FEVER: 0
ABDOMINAL PAIN: 0
NAUSEA: 0
SHORTNESS OF BREATH: 0
POLYDIPSIA: 0
VOMITING: 0
SEIZURES: 0
INSOMNIA: 1
LOSS OF CONSCIOUSNESS: 0
BLURRED VISION: 1
DIZZINESS: 1

## 2018-09-11 ASSESSMENT — LIFESTYLE VARIABLES: SUBSTANCE_ABUSE: 0

## 2018-09-11 ASSESSMENT — PAIN SCALES - GENERAL
PAINLEVEL_OUTOF10: 8
PAINLEVEL_OUTOF10: 6

## 2018-09-11 NOTE — CARE PLAN
Problem: Pain Management  Goal: Pain level will decrease to patient's comfort goal    Intervention: Follow pain managment plan developed in collaboration with patient and Interdisciplinary Team  Tramadol administered as per MAR to relievb patient's 8/10 right eye pain. Pt pain decreased to 3/10.      Problem: Psychosocial needs  Goal: Anxiety reduction  Light turned low, noise minimized to relieve pt anxiety and promote rest. Pt able to rest comfortably.

## 2018-09-11 NOTE — CARE PLAN
Problem: Pain Management  Goal: Pain level will decrease to patient's comfort goal  Outcome: PROGRESSING AS EXPECTED  Patient states that pain is improving in right eye. Continue scheduled tylenol and ibuprofen.     Problem: Urinary Elimination:  Goal: Ability to reestablish a normal urinary elimination pattern will improve  Outcome: PROGRESSING SLOWER THAN EXPECTED  Patient up to use restroom x 2. Patient w/o incontinence. Encouraged patient to time void to avoid incontinence episodes.

## 2018-09-11 NOTE — PROGRESS NOTES
"Received report, assumed pt care. Pt is A&O x4 resting comfortably in bed. Assessment completed. No c/o pain, pt stated \"My IV feels tender, the heat pad makes it feel better\" but pt. Denied heat pad in the moment. Side rails up x2, bed in lowest position, call light within reach. Pt. Told to call us if she needs to get OOB.  SN [facility].   "

## 2018-09-11 NOTE — DISCHARGE PLANNING
Anticipated Discharge Disposition: Home with grandparents without skilled needs.    Action: Pt anxious to go home and does not want to return to snf where she was rehabing for a ground level fall prior to admission to [facility]. Pt states she is ambulating without difficulties and grandparents can give her a ride home at time of discharge. Pt also concerned about not wanting to use all of her medicare snf days left available for her. Antibiotic stop date is 9/14/18, and pt will receive IM dose before dc per MD note.    Barriers to Discharge: Medical clearance.    Plan: RN CM to continue to assist with any dc needs that may be identified.

## 2018-09-11 NOTE — CONSULTS
"Hospital Neurology Consult:    Referring Physician: Dr. Santamaria     Reason for consultation: Optic neuritis    HPI: Kristin Bladerrama is a 27 y.o. female presenting to the hospital for optic neuritis and L breast cellulitis and consulted for Optic Neuritis. The patient states she has had 3 optic neuritis episodes total. She states this began approximately 8-9 days ago with discomfort on her R eye, with lacrimation followed by developing intense R orbital pain over the next few days, exacerbated by eye motion, as well as loss of definition of colors, specifically \"loss of red\". Her pain was up to a 10/10, described as intense stabbing over the eye. Additionally, she was also lightheaded and dizzy, as well as having at least one episode of emesis since 7 days ago. She states she's had 3 episodes of optic neuritis, always on the R eye. Her vision \"does not go back up to 100%\" after each episode. She was previously evaluated by Neuro-Ophthalmology, but has not followed up in a few years. She has a bowel/bladder stimulator placed by Dr. Noyola \"A few years ago\". She states this helps her with bowel/bladder incontinence but has not followed up with Dr. Noyola for some time. Additionally, she has a history of hemiplegic migraines and follows with Dr. Stevenson. She endorses worsening of her symptoms with heat, and she endorses some \"zaps\" over her spine with neck motion. She does not endorse other episodes of weakness or transient numbness.     ROS:    As above. All other systems reviewed and are negative.    Past Medical History:    has a past medical history of Abdominal pain; Anginal syndrome; Apnea, sleep; Arrhythmia; Arthritis; ASTHMA; Atrial fibrillation (HCC); Back pain; Borderline personality disorder; Breath shortness; Cardiac arrhythmia; Chickenpox; Chronic UTI (9/18/2010); Cough; Daytime sleepiness; Depression; Diabetes (HCC); Diarrhea; Disorder of thyroid; Fall; Fatigue; Frequent headaches; Gasping for breath; " "Gynecological disorder; Headache(784.0); Heart burn; History of falling; Hypertension; Migraine; 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HCC); Multiple personality disorder; Nausea; Obesity; Pain (08-15-12); Painful joint; PCOS (polycystic ovarian syndrome); Pneumonia (2012); Psychosis; Renal disorder; Ringing in ears; Scoliosis; Shortness of breath; Sinus tachycardia (10/31/2013); Sleep apnea; Snoring; Tonsillitis; Tuberculosis; Urinary bladder disorder; Urinary incontinence; Weakness; and Wears glasses.    FHx:  family history includes Genitourinary () in her sister; Heart Disease in her maternal grandmother and mother; Hypertension in her maternal grandmother, maternal uncle, and mother; No Known Problems in her sister; Other in her mother and sister; Sleep Apnea in her mother.    SHx:   reports that she has never smoked. She has never used smokeless tobacco. She reports that she uses drugs, including Marijuana and Inhaled. She reports that she does not drink alcohol.    Allergies:  Allergies   Allergen Reactions   • Cefdinir Shortness of Breath and Itching     Tolerated 1/18/17  Tolerates ceftriaxone    • Depakote [Divalproex Sodium] Unspecified     Muscle spasms/muscle pain and weakness     • Doxycycline Anaphylaxis and Vomiting     RXN=unknown   • Abilify Unspecified     Headaches/muscle twitching     • Amitriptyline Unspecified     Headaches     • Amoxicillin Rash     Pt states \"I get a rash\".     • Ciprofloxacin Rash     Pt states \"I get a rash\".     • Clindamycin Nausea     Even with food     • Ees [Erythromycin] Vomiting and Nausea   • Flagyl [Metronidazole Hcl] Unspecified     \"eye problems\"     • Flomax [Tamsulosin Hydrochloride] Swelling   • Metformin Unspecified     Increased lactic acid      • Sulfa Drugs Hives and Rash     RXN=since childhood   • Tape Rash     Tears skin off   coban with Tegaderm tape ok  RXN=ongoing   • Vancomycin Itching     Pt becomes flushed in face and chest.   RXN=7/10/16   • Wound Dressing " Adhesive Hives     By pt report   • Cephalexin [Keflex] Rash     Pt states she gets a rash with this medication  Tolerates ceftriaxone   • Erythromycin Rash     .   • Levofloxacin Unspecified     Leg muscle cramps   • Metronidazole Rash     .   • Valproic Acid Rash     .       Medications:    Current Facility-Administered Medications:   •  [START ON 9/11/2018] lactobacillus rhamnosus (CULTURELLE) capsule 1 Cap, 1 Cap, Oral, QDAY with Breakfast, Negin Sims M.D.  •  calcium carbonate (TUMS) chewable tab 500 mg, 500 mg, Oral, BID PRN, Bolivar Davis M.D.  •  methylPREDNISolone sod succ (SOLU-MEDROL) 250 mg in  mL IVPB, 250 mg, Intravenous, Q6HRS, Juan Reeder M.D., Stopped at 09/10/18 1308  •  aspirin EC (ECOTRIN) tablet 81 mg, 81 mg, Oral, DAILY, Juan Reeder M.D., 81 mg at 09/10/18 0559  •  busPIRone (BUSPAR) tablet 5 mg, 5 mg, Oral, BID, Juan Reeder M.D., 5 mg at 09/10/18 0558  •  FLUoxetine (PROZAC) capsule 10 mg, 10 mg, Oral, DAILY, Juan Reeder M.D., 10 mg at 09/10/18 0600  •  traZODone (DESYREL) tablet 100 mg, 100 mg, Oral, QHS, Juan Reeder M.D., 100 mg at 09/09/18 2029  •  ziprasidone (GEODON) capsule 80 mg, 80 mg, Oral, BID, Juan Reeder M.D., 80 mg at 09/10/18 0559  •  insulin lispro (HUMALOG) injection 1-6 Units, 1-6 Units, Subcutaneous, 4X/DAY ACHS, 2 Units at 09/10/18 1210 **AND** Accu-Chek ACHS, , , Q AC AND BEDTIME(S) **AND** NOTIFY MD and PharmD, , , Once **AND** glucose 4 g chewable tablet 16 g, 16 g, Oral, Q15 MIN PRN **AND** dextrose 50% (D50W) injection 25 mL, 25 mL, Intravenous, Q15 MIN PRN, Juan Reeder M.D.  •  baclofen (LIORESAL) tablet 10 mg, 10 mg, Oral, BID, Glynn Paula M.D., 10 mg at 09/10/18 0601  •  ibuprofen (MOTRIN) tablet 400 mg, 400 mg, Oral, Q6HRS, Glynn Paula M.D., 400 mg at 09/10/18 1209  •  acetaminophen (TYLENOL) tablet 650 mg, 650 mg, Oral, Q6HRS, Glynn Paula M.D., 650 mg at 09/10/18 1209  •   pregabalin (LYRICA) capsule 100 mg, 100 mg, Oral, BID, Glynn Paula M.D., 100 mg at 09/10/18 0559  •  tramadol (ULTRAM) 50 MG tablet 50 mg, 50 mg, Oral, Q6HRS PRN, Sejal Sanchez M.D., 50 mg at 09/10/18 0848  •  cefTRIAXone (ROCEPHIN) 1 g in  mL IVPB, 1 g, Intravenous, Q24HRS, Glynn Paula M.D., Stopped at 09/10/18 0738  •  ivabradine (CORLANOR) tablet 5 mg, 5 mg, Oral, BID WITH MEALS, Víctor Amin M.D., 5 mg at 09/10/18 0730  •  senna-docusate (PERICOLACE or SENOKOT S) 8.6-50 MG per tablet 2 Tab, 2 Tab, Oral, BID PRN **AND** polyethylene glycol/lytes (MIRALAX) PACKET 1 Packet, 1 Packet, Oral, QDAY PRN **AND** magnesium hydroxide (MILK OF MAGNESIA) suspension 30 mL, 30 mL, Oral, QDAY PRN **AND** bisacodyl (DULCOLAX) suppository 10 mg, 10 mg, Rectal, QDAY PRN, Bassam Rojas M.D.  •  guaiFENesin dextromethorphan (ROBITUSSIN DM) 100-10 MG/5ML syrup 10 mL, 10 mL, Oral, Q6HRS PRN, Bassam Rojas M.D.  •  enoxaparin (LOVENOX) inj 40 mg, 40 mg, Subcutaneous, DAILY, Bassam Rojas M.D., 40 mg at 09/10/18 0559    Vitals:   Vitals:    09/09/18 1930 09/10/18 0335 09/10/18 0819 09/10/18 1703   BP: 108/47 104/52 118/80 114/67   Pulse: 79 77 83 70   Resp: 16 14 17 16   Temp: 36.1 °C (97 °F) 36 °C (96.8 °F) 36.4 °C (97.5 °F) 35.9 °C (96.6 °F)   SpO2: 96% 93% 92% 99%   Weight:       Height:           Labs:  Lab Results   Component Value Date/Time    PROTHROMBTM 13.1 07/14/2018 11:54 AM    INR 1.02 07/14/2018 11:54 AM      Lab Results   Component Value Date/Time    WBC 8.1 09/07/2018 06:52 PM    WBC 6.1 07/20/2010 11:00 AM    RBC 4.46 09/07/2018 06:52 PM    RBC 4.38 07/20/2010 11:00 AM    HEMOGLOBIN 13.5 09/07/2018 06:52 PM    HEMATOCRIT 40.4 09/07/2018 06:52 PM    MCV 90.6 09/07/2018 06:52 PM    MCV 93 07/20/2010 11:00 AM    MCH 30.3 09/07/2018 06:52 PM    MCH 30.1 07/20/2010 11:00 AM    MCHC 33.4 (L) 09/07/2018 06:52 PM    MPV 12.1 09/07/2018 06:52 PM    NEUTSPOLYS 50.30 09/07/2018 06:52 PM     LYMPHOCYTES 37.40 09/07/2018 06:52 PM    MONOCYTES 7.10 09/07/2018 06:52 PM    EOSINOPHILS 4.40 09/07/2018 06:52 PM    BASOPHILS 0.60 09/07/2018 06:52 PM      Lab Results   Component Value Date/Time    SODIUM 139 09/07/2018 06:52 PM    POTASSIUM 3.8 09/07/2018 06:52 PM    CHLORIDE 106 09/07/2018 06:52 PM    CO2 22 09/07/2018 06:52 PM    GLUCOSE 84 09/07/2018 06:52 PM    BUN 11 09/07/2018 06:52 PM    CREATININE 0.71 09/07/2018 06:52 PM    CREATININE 0.75 (L) 07/20/2010 11:00 AM    BUNCREATRAT 19 07/20/2010 11:00 AM    GLOMRATE >59 07/20/2010 11:00 AM      Lab Results   Component Value Date/Time    CHOLSTRLTOT 236 (H) 07/15/2018 02:45 AM     (H) 07/15/2018 02:45 AM    HDL 75 07/15/2018 02:45 AM    TRIGLYCERIDE 121 07/15/2018 02:45 AM       Lab Results   Component Value Date/Time    ALKPHOSPHAT 58 09/07/2018 06:52 PM    ASTSGOT 20 09/07/2018 06:52 PM    ALTSGPT 42 09/07/2018 06:52 PM    TBILIRUBIN 0.6 09/07/2018 06:52 PM      No results found for: TSH  Lab Results   Component Value Date/Time    FREET4 0.86 09/07/2018 06:52 PM    FREET4 0.84 05/17/2017 10:23 AM     No results found for: FREET3    Imaging:  CT HEAD 9/7/18 reviewed personally, agree with reading no acute findings, within normal limits.     Physical Exam:     General: Well appearing 28 y/o female in NAD  Cardio: Normal S1/S2. No murmurs on auscultation.  Pulm: CTAX2. No respiratory distress.     Neurologic:  Mental Status:  AAOx4. Able to follow commands/cross midline. Speech fluent/nondysarthric. Language functions intact. No neglect/apraxia.  Cranial Nerves: PERRL. EOMi. Swinging light test positive for RAPD on R eye. Face symmetric, palate/tongue midline. Visual fields limited in OD on confrontation due to difficulty focusing. Facial sensation intact.   Motor: Normal muscle tone and bulk. Strength is 5/5 throughout w/ exception of R foot dorsiflexion 4/5 with presence of some give-way weakness on bilateral foot dorsiflexion. No abnormal  movements.  Reflexes: 2/4 throughout. Flexor plantar responses bilaterally. Present Noland's reflex bilaterally.  Coordination:  Finger-nose/heel-shin without ataxia or dysmetria.   Sensation:  Normal to pinprick, soft touch, proprioception.   Gait/Station:  Patient has a waddling gait, but is able to ambulate without assistance. No steppage gait.       Assessment/Plan:    Kristin Balderrama is a 27 y.o. female with history of recurrent ON, foot drop, bowel/bladder incontinence, chronic pain, afib, TONYA, arthritis, hemiplegic migraines, anxiety, depression and schizophrenia presenting to the hospital for optic neuritis and L breast cellulitis and consulted for optic neuritis. At this time, the patient does not appear to exhibit many UMN signs, however she does appear to have a R RAPD, which would be consistent with ON. Given that she has had recurrent episodes of this, it raises concern for a demyelinating disorder such as MS or NMO. Other potential etiologies that can cause ON could be infectious such as cat scratch disease, lyme disease, and lupus. Other rarer etiologies such as CRION may be considered. Her foot drop and leg weakness appear to be LMN in etiology, and in this regard may be due to mechanical causes with her L spine.     Plan:  -It is recommended to evaluate for possibility of obtaining an MRI scan if the neuro-stimulator is turned off. If this can be done, MR Brain, C, T and L spine should be done with and without contrast, as well as MR Orbits. Given her kidney disease, she should be cleared before getting gadolinium.  -Recommend ESR, CRP, AI, dsDNA, ANCA panel, NMO and anti MOG antibodies.   -Recommend serum testing for lyme disease and cat scratch disease  -The patient will benefit from a neuro-ophthalmology appointment.  -Consider followup of the patient's foot drops/peripheral neuropathy with an EMG.  -Continue steroid treatment for ON.  -Will need followup with Dr. Stevenson as an outpatient.    -Neurology will follow results of testing      Ronny Koroma M.D., Diplomat of the American Board of Psychiatry and Neurology  [facility]

## 2018-09-11 NOTE — PROGRESS NOTES
Internal Medicine Interval Note  Note Author: Negin Sims M.D.     Name Kristin Balderrama     1990   Age/Sex 27 y.o. female   MRN [id number]   Code Status Full     After 5PM or if no immediate response to page, please call for cross-coverage  Attending/Team: Gabriel / Harish See Patient List for primary contact information  Call [phone] to page    1st Call - Day Intern (R1):   Levi 2nd Call - Day Sr. Resident (R2/R3):   Ryan         Reason for interval visit  (Principal Problem)   R eye pain      Interval Problem Daily Status Update  (24 hours, problem oriented, brief subjective history, new lab/imaging data pertinent to that problem)     Pt does not report eye pain this morning. She states her blurry vision is improved and her ability to see colors is returning. Pt's vision in R eye is 40/20 w/ glasses. No HA/nausea/vomiting.     Pt is very anxious to go home. She says she will lose her welfare check if she spends too much time outside of her home (i.e. In hospitals).     Dr. Yousif, neuro-optho, was asked to see the patient. He will not see her until a neurology consult is done and a slew of lab tests are run. He also prefers that an MRI of the orbit is completed. Consult to neuro placed. After the consult to Benja was placed, pt stated she saw him last year for possible optic neuritis. Pt missed her appointment with him this year d/t a hospitalization for cellulitis. MRI difficult but not impossible to organize for pt bc she has an Interstim for her bowel/bladder incontinence that that needs to be turned off.     -208    This is my first day evaluating the pt for her L breast cellulitis. Per pt, the cellulitis is much improved.      Review of Systems   Constitutional: Negative for chills and fever.   HENT: Negative for ear pain and hearing loss.    Eyes: Positive for blurred vision and pain.   Respiratory: Negative for cough and shortness of breath.     Cardiovascular: Negative for chest pain and leg swelling.   Gastrointestinal: Negative for abdominal pain, diarrhea, nausea and vomiting.   Genitourinary: Negative for flank pain and hematuria.   Musculoskeletal: Negative for falls and joint pain.   Skin: Negative for itching and rash.   Neurological: Positive for dizziness. Negative for seizures and loss of consciousness.   Endo/Heme/Allergies: Negative for polydipsia. Does not bruise/bleed easily.   Psychiatric/Behavioral: Negative for substance abuse. The patient has insomnia.        Disposition/Barriers to discharge:   Pt does not need to be set up with an infusion center. She will be given the remaining few doses of abx via intramuscular injection upon d/c.   Awaiting neuro consult.       Consultants/Specialty  Neuro  Dr. Yousif-will not see pt until neurology evaluates and a slew of lab tests are run including: Quantiferon, IgG, Antiphospholipid, RPR, B12, CMP/CBC, and a few others (need to clarify). He also only wants to speak to attendings. Requested facesheet and  H&P be sent to his office, [phone]  PCP: Torres Brody M.D.      Quality Measures  Quality-Core Measures   Reviewed items::  EKG reviewed, Labs reviewed, Medications reviewed and Radiology images reviewed  Andrade catheter::  No Andrade  DVT prophylaxis pharmacological::  Enoxaparin (Lovenox)  Ulcer Prophylaxis::  Not indicated      Physical Exam       Vitals:    09/09/18 1930 09/10/18 0335 09/10/18 0819 09/10/18 1703   BP: 108/47 104/52 118/80 114/67   Pulse: 79 77 83 70   Resp: 16 14 17 16   Temp: 36.1 °C (97 °F) 36 °C (96.8 °F) 36.4 °C (97.5 °F) 35.9 °C (96.6 °F)   SpO2: 96% 93% 92% 99%   Weight:       Height:         Body mass index is 45.93 kg/m².   Oxygen Therapy:  Pulse Oximetry: 99 %, O2 (LPM): 0, O2 Delivery: None (Room Air)    Physical Exam   Constitutional: She is oriented to person, place, and time and well-developed, well-nourished, and in no distress.   HENT:   Head:  Normocephalic and atraumatic.   Mouth/Throat: No oropharyngeal exudate.   Eyes: Pupils are equal, round, and reactive to light. EOM are normal. No scleral icterus.   Neck: Normal range of motion. Neck supple.   Cardiovascular: Normal rate and regular rhythm.  Exam reveals no gallop and no friction rub.    No murmur heard.  Pulmonary/Chest: Effort normal and breath sounds normal. No respiratory distress. She has no wheezes. She has no rales.   Mild splotches of erythema predominantly on the left breast.    Abdominal: Soft. Bowel sounds are normal. She exhibits no distension and no mass. There is no tenderness. There is no rebound and no guarding.   Musculoskeletal: Normal range of motion. She exhibits no deformity.   Lymphadenopathy:     She has no cervical adenopathy.   Neurological: She is alert and oriented to person, place, and time.   Right eye vision 20/40 with glasses   Skin: Skin is warm and dry. She is not diaphoretic.   Psychiatric: Mood, memory, affect and judgment normal.         Assessment/Plan     * Acute right eye pain   Assessment & Plan    Improving  -5d of right stabbing eye pain, with decreased peripheral vision, blurry vision, decreased appreciation of colors, extreme pain on EOM, no injection   -ESR/CRP non elevated (temporal arteritis less likely)  -prior admissions for similar eye pain, prv diagnosed with optic neuritis and treated with steroid eye drops with resolution per pt  -possible optic neuritis, neuromyelitis optica, MS (all treated with steroids)  -solumedrol iv 250mg q6h x3days, prednisone po 1 mg/kg x11day & 4 day taper for presumed optic neuritis treatment  -consider MRI, patient has Interstim device for bowel stimulation to be addressed before MRI (Dr. Noyola - for possibility of turning it off for MRI)  -Neuro consulted        Cellulitis of left breast- (present on admission)   Assessment & Plan     Pt to receive Abx through Friday, 09/13. Per pt, she states she missed a dose last  Friday. If that is the case, then her last dose will be this Saturday 09/14. Review medical records   -Ceftriaxone 1 g IV daily.  -Upon d/c, receive remaining doses via IM.         Type 2 diabetes mellitus without complication, without long-term current use of insulin ([facility])- (present on admission)   Assessment & Plan    -insulin sliding scale, full home medication records not received yet        Chronic respiratory failure with hypoxia, on home oxygen therapy (HCC)- (present on admission)   Assessment & Plan    -patient on O2 at home until medicare stopped supplying, pt reports transient desats to 60% on rare occasion  -O2 protocol and continuous O2 monitoring        TONYA (obstructive sleep apnea)- (present on admission)   Assessment & Plan    -patient on O2 at home until medicare stopped supplying  -O2 protocol and continuous O2 monitoring        Chronic pain syndrome- (present on admission)   Assessment & Plan    -Baclofen 10 mg p.o. twice daily.        Peripheral neuropathy (CMS-[facility])- (present on admission)   Assessment & Plan    Lyrica 100 bid        Depression- (present on admission)   Assessment & Plan    -continue home dose trazadone        Psychosis, schizophrenia, simple ([facility])- (present on admission)   Assessment & Plan    -Ziprasidone 80 mg p.o. twice daily.        Anxiety- (present on admission)   Assessment & Plan    -Fluoxetine 10 mg p.o. daily.

## 2018-09-11 NOTE — PROGRESS NOTES
Received patient report. Patient asleep in room. Denies any eye pain currently. States she is tired. Patient denies any current needs. IV in RT forearm with no issues running TKO. Educated on POC. Patient with call light in reach, bed locked and lowest position. Continue hourly rounding.

## 2018-09-12 VITALS
HEART RATE: 58 BPM | RESPIRATION RATE: 17 BRPM | SYSTOLIC BLOOD PRESSURE: 125 MMHG | TEMPERATURE: 97.7 F | HEIGHT: 65 IN | BODY MASS INDEX: 45.99 KG/M2 | OXYGEN SATURATION: 93 % | DIASTOLIC BLOOD PRESSURE: 66 MMHG | WEIGHT: 276.02 LBS

## 2018-09-12 PROBLEM — E11.9 TYPE 2 DIABETES MELLITUS WITHOUT COMPLICATION, WITHOUT LONG-TERM CURRENT USE OF INSULIN (HCC): Status: RESOLVED | Noted: 2017-04-26 | Resolved: 2018-09-12

## 2018-09-12 PROBLEM — H57.11 ACUTE RIGHT EYE PAIN: Status: RESOLVED | Noted: 2018-09-08 | Resolved: 2018-09-12

## 2018-09-12 LAB
B BURGDOR AB SER IA-ACNC: 0.07 LIV (ref 0–1.2)
DSDNA AB TITR SER CLIF: NORMAL {TITER}
GLUCOSE BLD-MCNC: 149 MG/DL (ref 65–99)
GLUCOSE BLD-MCNC: 152 MG/DL (ref 65–99)
NUCLEAR IGG SER QL IA: NORMAL

## 2018-09-12 PROCEDURE — 700111 HCHG RX REV CODE 636 W/ 250 OVERRIDE (IP): Performed by: STUDENT IN AN ORGANIZED HEALTH CARE EDUCATION/TRAINING PROGRAM

## 2018-09-12 PROCEDURE — A9270 NON-COVERED ITEM OR SERVICE: HCPCS | Performed by: STUDENT IN AN ORGANIZED HEALTH CARE EDUCATION/TRAINING PROGRAM

## 2018-09-12 PROCEDURE — 700102 HCHG RX REV CODE 250 W/ 637 OVERRIDE(OP): Performed by: STUDENT IN AN ORGANIZED HEALTH CARE EDUCATION/TRAINING PROGRAM

## 2018-09-12 PROCEDURE — 700105 HCHG RX REV CODE 258: Performed by: STUDENT IN AN ORGANIZED HEALTH CARE EDUCATION/TRAINING PROGRAM

## 2018-09-12 PROCEDURE — 82962 GLUCOSE BLOOD TEST: CPT

## 2018-09-12 PROCEDURE — 3E0234Z INTRODUCTION OF SERUM, TOXOID AND VACCINE INTO MUSCLE, PERCUTANEOUS APPROACH: ICD-10-PCS | Performed by: PSYCHIATRY & NEUROLOGY

## 2018-09-12 PROCEDURE — 99239 HOSP IP/OBS DSCHRG MGMT >30: CPT | Mod: GC | Performed by: INTERNAL MEDICINE

## 2018-09-12 PROCEDURE — 665998 HH PPS REVENUE CREDIT

## 2018-09-12 PROCEDURE — 90471 IMMUNIZATION ADMIN: CPT

## 2018-09-12 PROCEDURE — 665999 HH PPS REVENUE DEBIT

## 2018-09-12 PROCEDURE — 90686 IIV4 VACC NO PRSV 0.5 ML IM: CPT | Performed by: PSYCHIATRY & NEUROLOGY

## 2018-09-12 PROCEDURE — 700102 HCHG RX REV CODE 250 W/ 637 OVERRIDE(OP): Performed by: PHYSICAL MEDICINE & REHABILITATION

## 2018-09-12 PROCEDURE — A9270 NON-COVERED ITEM OR SERVICE: HCPCS | Performed by: PHYSICAL MEDICINE & REHABILITATION

## 2018-09-12 PROCEDURE — 700111 HCHG RX REV CODE 636 W/ 250 OVERRIDE (IP): Performed by: PSYCHIATRY & NEUROLOGY

## 2018-09-12 RX ORDER — PREDNISONE 20 MG/1
TABLET ORAL
Qty: 39 TAB | Refills: 0 | Status: SHIPPED | OUTPATIENT
Start: 2018-09-12 | End: 2018-10-16

## 2018-09-12 RX ORDER — PREDNISONE 10 MG/1
TABLET ORAL
Qty: 9 TAB | Refills: 0 | Status: SHIPPED | OUTPATIENT
Start: 2018-09-12 | End: 2018-10-16

## 2018-09-12 RX ADMIN — ACETAMINOPHEN 650 MG: 325 TABLET, FILM COATED ORAL at 00:35

## 2018-09-12 RX ADMIN — ZIPRASIDONE HCL 80 MG: 40 CAPSULE ORAL at 04:59

## 2018-09-12 RX ADMIN — SODIUM CHLORIDE 250 MG: 9 INJECTION, SOLUTION INTRAVENOUS at 06:10

## 2018-09-12 RX ADMIN — ACETAMINOPHEN 650 MG: 325 TABLET, FILM COATED ORAL at 04:58

## 2018-09-12 RX ADMIN — Medication 1 CAPSULE: at 08:36

## 2018-09-12 RX ADMIN — IBUPROFEN 400 MG: 800 TABLET, FILM COATED ORAL at 04:59

## 2018-09-12 RX ADMIN — SODIUM CHLORIDE 250 MG: 9 INJECTION, SOLUTION INTRAVENOUS at 00:36

## 2018-09-12 RX ADMIN — TRAMADOL HYDROCHLORIDE 50 MG: 50 TABLET, COATED ORAL at 08:36

## 2018-09-12 RX ADMIN — FLUOXETINE 10 MG: 10 CAPSULE ORAL at 04:59

## 2018-09-12 RX ADMIN — PREGABALIN 100 MG: 100 CAPSULE ORAL at 04:59

## 2018-09-12 RX ADMIN — ASPIRIN 81 MG: 81 TABLET, COATED ORAL at 04:59

## 2018-09-12 RX ADMIN — BACLOFEN 10 MG: 10 TABLET ORAL at 04:58

## 2018-09-12 RX ADMIN — TRAMADOL HYDROCHLORIDE 50 MG: 50 TABLET, COATED ORAL at 03:01

## 2018-09-12 RX ADMIN — ACETAMINOPHEN 650 MG: 325 TABLET, FILM COATED ORAL at 12:27

## 2018-09-12 RX ADMIN — INSULIN LISPRO 1 UNITS: 100 INJECTION, SOLUTION INTRAVENOUS; SUBCUTANEOUS at 12:35

## 2018-09-12 RX ADMIN — CEFTRIAXONE SODIUM 1 G: 1 INJECTION, POWDER, FOR SOLUTION INTRAMUSCULAR; INTRAVENOUS at 05:01

## 2018-09-12 RX ADMIN — ENOXAPARIN SODIUM 40 MG: 40 INJECTION SUBCUTANEOUS at 04:59

## 2018-09-12 RX ADMIN — INFLUENZA A VIRUS A/MICHIGAN/45/2015 X-275 (H1N1) ANTIGEN (FORMALDEHYDE INACTIVATED), INFLUENZA A VIRUS A/SINGAPORE/INFIMH-16-0019/2016 IVR-186 (H3N2) ANTIGEN (FORMALDEHYDE INACTIVATED), INFLUENZA B VIRUS B/PHUKET/3073/2013 ANTIGEN (FORMALDEHYDE INACTIVATED), AND INFLUENZA B VIRUS B/MARYLAND/15/2016 BX-69A ANTIGEN (FORMALDEHYDE INACTIVATED) 0.5 ML: 15; 15; 15; 15 INJECTION, SUSPENSION INTRAMUSCULAR at 12:26

## 2018-09-12 RX ADMIN — BUSPIRONE HYDROCHLORIDE 5 MG: 5 TABLET ORAL at 04:59

## 2018-09-12 RX ADMIN — IBUPROFEN 400 MG: 800 TABLET, FILM COATED ORAL at 12:27

## 2018-09-12 RX ADMIN — IBUPROFEN 400 MG: 800 TABLET, FILM COATED ORAL at 00:36

## 2018-09-12 ASSESSMENT — PAIN SCALES - GENERAL
PAINLEVEL_OUTOF10: 7
PAINLEVEL_OUTOF10: 8
PAINLEVEL_OUTOF10: 7
PAINLEVEL_OUTOF10: 9

## 2018-09-12 ASSESSMENT — ENCOUNTER SYMPTOMS
NAUSEA: 0
DIZZINESS: 0
ABDOMINAL PAIN: 0
INSOMNIA: 0
LOSS OF CONSCIOUSNESS: 0
BRUISES/BLEEDS EASILY: 0
COUGH: 0
FLANK PAIN: 0
EYE PAIN: 1
FALLS: 0
CHILLS: 0
FEVER: 0
VOMITING: 0
BLURRED VISION: 1
POLYDIPSIA: 0
SEIZURES: 0
DIARRHEA: 0
SHORTNESS OF BREATH: 0

## 2018-09-12 ASSESSMENT — LIFESTYLE VARIABLES: SUBSTANCE_ABUSE: 0

## 2018-09-12 NOTE — PROGRESS NOTES
Pt called Dr. Noyola, and was informed that she could NOT have an MRI done due to the patients's interstim device.

## 2018-09-12 NOTE — DISCHARGE INSTRUCTIONS
Discharge Instructions    Discharged to home by car with relative. Discharged via walking, hospital escort: Refused.  Special equipment needed: Walker    Be sure to schedule a follow-up appointment with your primary care doctor or any specialists as instructed.     Discharge Plan:   Influenza Vaccine Indication: Not indicated: Previously immunized this influenza season and > 8 years of age  Influenza Vaccine Given - only chart on this line when given: Influenza Vaccine Given (See MAR)    I understand that a diet low in cholesterol, fat, and sodium is recommended for good health. Unless I have been given specific instructions below for another diet, I accept this instruction as my diet prescription.   Other diet: diabetic      Special Instructions: None    · Is patient discharged on Warfarin / Coumadin?   No     Depression / Suicide Risk    As you are discharged from this [facility] facility, it is important to learn how to keep safe from harming yourself.    Recognize the warning signs:  · Abrupt changes in personality, positive or negative- including increase in energy   · Giving away possessions  · Change in eating patterns- significant weight changes-  positive or negative  · Change in sleeping patterns- unable to sleep or sleeping all the time   · Unwillingness or inability to communicate  · Depression  · Unusual sadness, discouragement and loneliness  · Talk of wanting to die  · Neglect of personal appearance   · Rebelliousness- reckless behavior  · Withdrawal from people/activities they love  · Confusion- inability to concentrate     If you or a loved one observes any of these behaviors or has concerns about self-harm, here's what you can do:  · Talk about it- your feelings and reasons for harming yourself  · Remove any means that you might use to hurt yourself (examples: pills, rope, extension cords, firearm)  · Get professional help from the community (Mental Health, Substance Abuse, psychological  counseling)  · Do not be alone:Call your Safe Contact- someone whom you trust who will be there for you.  · Call your local CRISIS HOTLINE 449-7432 or 756-342-9745  · Call your local Children's Mobile Crisis Response Team [place] (684) 476-1067 or www.[employer]  · Call the toll free National Suicide Prevention Hotlines   · National Suicide Prevention Lifeline 839-907-IOMO (1478)  · National Hope Line Network 800-SUICIDE (823-9594)

## 2018-09-12 NOTE — PROGRESS NOTES
Patient in bed reports new IV painful and bruised. DC'd. Previous IV in place tender, but still patent, no redness or swelling noted. Patient Provided tramadol for back pain. Denies eye pain currently. States she hopes to go home soon. VSS. No further questions or concerns at this time.

## 2018-09-12 NOTE — PROGRESS NOTES
Internal Medicine Interval Note  Note Author: Negin Sims M.D.     Name Kristin Balderrama     1990   Age/Sex 27 y.o. female   MRN [id number]   Code Status Full     After 5PM or if no immediate response to page, please call for cross-coverage  Attending/Team: Gabriel / Harish See Patient List for primary contact information  Call [phone] to page    1st Call - Day Intern (R1):   Levi 2nd Call - Day Sr. Resident (R2/R3):   Ryan         Reason for interval visit  (Principal Problem)   R eye pain      Interval Problem Daily Status Update  (24 hours, problem oriented, brief subjective history, new lab/imaging data pertinent to that problem)     Pt reports mild eye pain this morning. Vision in right eye 20/50. Pt reports continued improvement in color vision. She says yellow is the most difficult color to see.     Pt refused MRIs this morning and stated she wants to go home. Per radiology, in order to complete MRI, a representative from the  of the pt's Interstim would be on site to ensure that the Interstim was properly turned off before proceeding with the MRIs. Pt states she talked to Dr. Noyola, who inserted her Interstim, and he told her she cannot get an MRI due metal in the device. I offered to call Dr. Noyola and pt repeatedly stated she did not want the MRI and the thought of the MRI terrified her and made her very uncomfortable.     Pt did not complain of dysuria or suprapubic tenderness today.     L breast cellulitis improved. No abx scripts will be written upon discharge.     -198 over past 24 hours.     Pt will be discharged today on a prednisone taper. Pt told to return to hospital immediately if she experiences any more eye problems.        Review of Systems   Constitutional: Negative for chills and fever.   HENT: Negative for ear pain and hearing loss.    Eyes: Positive for blurred vision and pain.   Respiratory: Negative for cough and shortness of  breath.    Cardiovascular: Negative for chest pain and leg swelling.   Gastrointestinal: Negative for abdominal pain, diarrhea, nausea and vomiting.   Genitourinary: Negative for flank pain and hematuria.   Musculoskeletal: Negative for falls and joint pain.   Skin: Negative for itching and rash.   Neurological: Negative for dizziness, seizures and loss of consciousness.   Endo/Heme/Allergies: Negative for polydipsia. Does not bruise/bleed easily.   Psychiatric/Behavioral: Negative for substance abuse. The patient does not have insomnia.        Disposition/Barriers to discharge:   Pt refused MRI today. Pt to be discharged today       Consultants/Specialty  Neuro, MD Dr. Benja Henley-will not see pt until neurology evaluates and a slew of lab tests are run including: Quantiferon, IgG, Antiphospholipid, RPR, B12, CMP/CBC, and a few others (need to clarify). He also only wants to speak to attendings. Requested facesheet and  H&P be sent to his office, [phone]  PCP: Torres Brody M.D.      Quality Measures  Quality-Core Measures   Reviewed items::  EKG reviewed, Labs reviewed, Medications reviewed and Radiology images reviewed  Andrade catheter::  No Andrade  DVT prophylaxis pharmacological::  Enoxaparin (Lovenox)  Ulcer Prophylaxis::  Not indicated      Physical Exam       Vitals:    09/11/18 1735 09/11/18 1945 09/12/18 0415 09/12/18 0830   BP: 130/71 105/62 123/64 125/66   Pulse: 63 64 65 (!) 58   Resp: 18 19 17 17   Temp: 36.6 °C (97.9 °F) 36.3 °C (97.3 °F) 36.7 °C (98 °F) 36.5 °C (97.7 °F)   SpO2: 94% 93% 94% 93%   Weight:       Height:         Body mass index is 45.93 kg/m².   Oxygen Therapy:  Pulse Oximetry: 93 %, O2 (LPM): 0, O2 Delivery: None (Room Air)    Physical Exam   Constitutional: She is oriented to person, place, and time and well-developed, well-nourished, and in no distress.   HENT:   Head: Normocephalic and atraumatic.   Mouth/Throat: No oropharyngeal exudate.   Eyes: Pupils are equal,  round, and reactive to light. EOM are normal. No scleral icterus.   Did not notice afferent defects while evaluating pupils   Neck: Normal range of motion. Neck supple.   Cardiovascular: Normal rate and regular rhythm.  Exam reveals no gallop and no friction rub.    No murmur heard.  Pulmonary/Chest: Effort normal and breath sounds normal. No respiratory distress. She has no wheezes. She has no rales.   Mild splotches of erythema predominantly on the left breast.    Abdominal: Soft. Bowel sounds are normal. She exhibits no distension and no mass. There is no tenderness. There is no rebound and no guarding.   Musculoskeletal: Normal range of motion. She exhibits no deformity.   Lymphadenopathy:     She has no cervical adenopathy.   Neurological: She is alert and oriented to person, place, and time.   Right eye vision 20/50 with glasses   Skin: Skin is warm and dry. She is not diaphoretic.   Psychiatric: Mood, memory, affect and judgment normal.         Assessment/Plan     * Acute right eye pain   Assessment & Plan    Improving  -5d of right stabbing eye pain, with decreased peripheral vision, blurry vision, decreased appreciation of colors, extreme pain on EOM, no injection   -ESR/CRP non elevated (temporal arteritis less likely)  -prior admissions for similar eye pain, prv diagnosed with optic neuritis and treated with steroid eye drops with resolution per pt  -possible optic neuritis, neuromyelitis optica, MS (all treated with steroids)  -solumedrol iv 250mg q6h x3days, prednisone po 1 mg/kg x11day & 4 day taper for presumed optic neuritis treatment  -many labs pending  - MRI, patient has Interstim device for bowel stimulation to be addressed before MRI (Dr. Noyola - for possibility of turning it off for MRI); pending. Need pt to bring in remote to turn off her Interstim  -Neuro consulted, appreciate recommendations  -Awaiting Ab tests  -Pt refused MRI     Discharge: Pt to follow up outpt with Dr. Yousif. Pt to  return to ED immediately if condition worsens. Prednisone taper        Cellulitis of left breast- (present on admission)   Assessment & Plan    Cellulitis improved. Pt was receiving Ceftriaxone 1 g IV daily.    Discharge: No abx        Type 2 diabetes mellitus without complication, without long-term current use of insulin ([facility])- (present on admission)   Assessment & Plan    -insulin sliding scale, full home medication records not received yet    Discharge: Con't home insulin pump        Chronic respiratory failure with hypoxia, on home oxygen therapy ([facility])- (present on admission)   Assessment & Plan    -patient on O2 at home until medicare stopped supplying, pt reports transient desats to 60% on rare occasion  -O2 protocol and continuous O2 monitoring    Discharge: Con't home O2        TONYA (obstructive sleep apnea)- (present on admission)   Assessment & Plan    -patient on O2 at home until medicare stopped supplying  -O2 protocol and continuous O2 monitoring    Discharge: Home O2        Chronic pain syndrome- (present on admission)   Assessment & Plan    Discharge: Baclofen 10 mg p.o. twice daily.        Peripheral neuropathy ([facility])- (present on admission)   Assessment & Plan    Discharge: Lyrica 100 bid        Depression- (present on admission)   Assessment & Plan    Discharge: continue home dose trazadone        Psychosis, schizophrenia, simple ([facility])- (present on admission)   Assessment & Plan    Discharge: Ziprasidone 80 mg p.o. twice daily.        Anxiety- (present on admission)   Assessment & Plan    Discharge: Fluoxetine 10 mg p.o. daily.

## 2018-09-12 NOTE — CARE PLAN
Problem: Pain Management  Goal: Pain level will decrease to patient's comfort goal  Outcome: NOT MET  Patient states eye pain improving. Has chronic back pain. Tramadol for pain. Educated on nonpharm techniques for pain. Provided Ice packs.     Problem: Respiratory:  Goal: Respiratory status will improve  Outcome: PROGRESSING AS EXPECTED      Problem: Urinary Elimination:  Goal: Ability to reestablish a normal urinary elimination pattern will improve  Outcome: PROGRESSING AS EXPECTED  UA ordered and will attempt to obtain per patient c/o dysuria and pelvic pain.

## 2018-09-12 NOTE — PROGRESS NOTES
Patient a+o x 4, denies sob/lightheadedness/numbness/tingling/dizziness/chest pain/n/v/d. Tolerating diet. C/o back pain throughout shift- standing tylenol/ibuprofen and prn tramadol admin a/o with good effect - ice provided in addition. Patient ambulated frequently in halls throughout shift.  Fall precautions/hourly rounding maintained, call light within reach and functioning, all items within reach.  Patient encouraged to call for assistance, poc reviewed with patient - patient discharged home - patient stated she was ready to be discharged home, ?'s/concerns answered. D/c instructions reviewed with patient. Rx x 2 provided to patient.

## 2018-09-13 ENCOUNTER — PATIENT OUTREACH (OUTPATIENT)
Dept: HEALTH INFORMATION MANAGEMENT | Facility: OTHER | Age: 28
End: 2018-09-13

## 2018-09-13 LAB
ANCA IGG TITR SER IF: NORMAL {TITER}
AQP4 H2O CHANNEL AB SERPL IA-ACNC: 0 U/ML

## 2018-09-13 PROCEDURE — 665999 HH PPS REVENUE DEBIT

## 2018-09-13 PROCEDURE — 665998 HH PPS REVENUE CREDIT

## 2018-09-13 NOTE — PROGRESS NOTES
9/13/18 1:45pm:  CM post discharge outreach call first attempt.   left requesting return call to  at [phone].    9/13/18 4:25 pm:  CM post discharge call completed.

## 2018-09-14 ENCOUNTER — HOME CARE VISIT (OUTPATIENT)
Dept: HOME HEALTH SERVICES | Facility: HOME HEALTHCARE | Age: 28
End: 2018-09-14
Payer: MEDICARE

## 2018-09-14 PROCEDURE — 665998 HH PPS REVENUE CREDIT

## 2018-09-14 PROCEDURE — 665999 HH PPS REVENUE DEBIT

## 2018-09-17 NOTE — PROGRESS NOTES
Patient Kristin Balderrama was admitted to [facility] on 8/2/18 for cellulitis of left breast.  The patient was discharged to home on 8/8/18 and instructed to follow up with her PCP.  The patient successfully filled her discharge medications and [facility]  began service on 8/9/18.  The patient followed up with her PCP on 8/16/18, however the patient continued to have difficulties and was ultimately admitted to [facility] on 8/21/18.

## 2018-09-18 ENCOUNTER — OFFICE VISIT (OUTPATIENT)
Dept: MEDICAL GROUP | Facility: MEDICAL CENTER | Age: 28
End: 2018-09-18
Payer: MEDICARE

## 2018-09-18 ENCOUNTER — PATIENT OUTREACH (OUTPATIENT)
Dept: HEALTH INFORMATION MANAGEMENT | Facility: OTHER | Age: 28
End: 2018-09-18

## 2018-09-18 VITALS
BODY MASS INDEX: 44.32 KG/M2 | OXYGEN SATURATION: 92 % | RESPIRATION RATE: 16 BRPM | DIASTOLIC BLOOD PRESSURE: 64 MMHG | HEIGHT: 65 IN | TEMPERATURE: 98.7 F | WEIGHT: 266 LBS | HEART RATE: 111 BPM | SYSTOLIC BLOOD PRESSURE: 122 MMHG

## 2018-09-18 DIAGNOSIS — H57.10 PAIN IN EYE, UNSPECIFIED LATERALITY: ICD-10-CM

## 2018-09-18 DIAGNOSIS — E11.9 TYPE 2 DIABETES MELLITUS WITHOUT COMPLICATION, WITHOUT LONG-TERM CURRENT USE OF INSULIN (HCC): ICD-10-CM

## 2018-09-18 PROCEDURE — 99495 TRANSJ CARE MGMT MOD F2F 14D: CPT | Performed by: INTERNAL MEDICINE

## 2018-09-18 NOTE — PROGRESS NOTES
Subjective:     POST DISCHARGE CALL:  Discharge Date:9/12/2018   Date of Outreach Call: 9/13/2018  4:23 PM  Now that you're home, how are you doing? Good  Comment:Pt reports she is feeling much better. States  she will contact Dr. Yousif to schedule a follow-up  Do you have questions about your medications? No    Did you fill your medications? Yes    Do you have a follow-up appointment scheduled?Yes  Comment:PCP on 9/18/18    Discharging Department: Gabi Morrissey    Number of Attempts: 2  Current or previous attempts completed within two business days of discharge? Yes  Provided education regarding treatment plan, medication, self-management, ADLs? Yes  Has patient completed Advance Directive? If yes, advise them to bring to appointment. No  Care Manager phone number provided? Yes  Is there anything else I can help you with? No    Nurses outreach call note above reviewed.      Have reviewed discharge summary as well as all imaging and labs performed while in the hospital.    CC: Follow-up hospitalization eye pain    HPI:   Kristin presents today with the following.    1. Pain in eye, unspecified laterality  Presents after being hospitalized complaining of eye pain.  She also was having decreased vision.  She was in the midst of getting treated for a cellulitis of the breast.  CT of the head was negative.  She was treated with high-dose steroids her eye issues quickly resolved.  She has been seen by neuro-ophthalmology with no specific findings.  She has no persistent symptomology.  She is supposed to follow-up with neuro-ophthalmology    2. Type 2 diabetes mellitus without complication, without long-term current use of insulin (HCC)  Blood sugars were elevated while in the hospital they are coming down at the steroids are being tapered off.  She is currently taking Januvia unknown reason why she still has this medication as she has been prescribed Victoza.      Patient Active Problem List    Diagnosis Date Noted   •  Cellulitis of left breast 08/03/2018     Priority: High   • Left leg weakness 07/14/2018     Priority: High   • Sinus tachycardia 10/31/2013     Priority: High   • Chronic inflammatory arthritis 05/23/2016     Priority: Medium   • Morbid obesity with BMI of 45.0-49.9, adult ([facility]) 10/24/2017     Priority: Low   • Depression 10/28/2016     Priority: Low   • Schizophrenia ([facility]) 10/27/2016     Priority: Low   • Psychosis, schizophrenia, simple ([facility]) 09/29/2016     Priority: Low     Class: Chronic   • Acquired hypothyroidism 11/23/2015     Priority: Low   • PCOS (polycystic ovarian syndrome) 11/23/2015     Priority: Low   • Progressive focal motor weakness 06/28/2015     Priority: Low   • Fatty liver disease, nonalcoholic 01/19/2015     Priority: Low   • Anxiety 12/16/2014     Priority: Low   • Knee pain, right 02/13/2014     Priority: Low   • Neurogenic bladder 04/02/2011     Priority: Low   • Chronic UTI 09/18/2010     Priority: Low   • Borderline personality disorder in adult 09/18/2010     Priority: Low   • Chronic respiratory failure with hypoxia, on home oxygen therapy ([facility]) 08/08/2018   • Transaminitis 08/08/2018   • TONYA (obstructive sleep apnea) 01/09/2018   • Functional diarrhea 01/05/2018   • Breast wound 11/06/2017   • Intractable episodic cluster headache 09/14/2017   • Rash 06/09/2017   • Hashimoto's encephalopathy 05/17/2017   • Fall at home 05/13/2017   • On home oxygen therapy 04/15/2017   • Chest pain 03/30/2017   • Weakness of right upper extremity 02/23/2017   • Chronic suprapubic catheter ([facility]) 02/16/2017   • Hypovitaminosis D 11/29/2016   • Chronic pain syndrome 10/27/2016   • Bowel and bladder incontinence 10/27/2016   • Galactorrhea 07/22/2016   • Elevated sedimentation rate 06/27/2016   • Weakness of both lower extremities 06/22/2016   • Vitamin D deficiency 05/21/2016   • Morbidly obese ([facility]) 03/07/2016   • Scoliosis 03/07/2016   • GERD (gastroesophageal reflux disease) 03/07/2016   • Peripheral  neuropathy ([facility]) 03/06/2016   • H/O prior ablation treatment 02/10/2016       Current Outpatient Prescriptions   Medication Sig Dispense Refill   • predniSONE (DELTASONE) 20 MG Tab Prednisone taper instructions:  60 mg x 7 days, Sept 12th-18th  50 mg x 3 days, Sept 19, 20, 21  40 mg x 3 days, Sept 22, 23,24  30 mg x 3 days, Sept 25, 26, 27  20 mg x 3 days, Sept 28, 29, 30  10 mg x 3 days, Oct 1, 2, 3 39 Tab 0   • predniSONE (DELTASONE) 10 MG Tab Prednisone taper instructions:  60 mg x 7 days, Sept 12th-18th  50 mg x 3 days, Sept 19, 20, 21  40 mg x 3 days, Sept 22, 23,24  30 mg x 3 days, Sept 25, 26, 27  20 mg x 3 days, Sept 28, 29, 30  10 mg x 3 days, Oct 1, 2, 3 9 Tab 0   • cholestyramine (QUESTRAN,PREVALITE) 4 GM Pack Take 4 g by mouth every morning.     • lidocaine (LIDODERM) 5 % Patch Apply 1 Patch to skin as directed every 24 hours. Apply to left hip     • melatonin 3 MG Tab Take 6 mg by mouth every bedtime.     • SITagliptin (JANUVIA) 25 MG Tab Take 25 mg by mouth every day.     • traZODone (DESYREL) 100 MG Tab Take 100 mg by mouth every bedtime.     • vitamin D (CHOLECALCIFEROL) 1000 UNIT Tab Take 1,000 Units by mouth every day.     • pregabalin (LYRICA) 75 MG Cap Take 75 mg by mouth 2 times a day.     • triamcinolone acetonide (KENALOG) 0.1 % Cream Apply 1 g to affected area(s) 2 times a day. To left breast  And LUE     • nystatin (MYCOSTATIN) 555116 UNIT/GM Cream topical cream Apply 0.0001 g to affected area(s) 2 times a day. 1 Tube 3   • furosemide (LASIX) 40 MG Tab Take 40 mg by mouth every day.     • cyanocobalamin ([employer] VITAMIN B-12) 500 MCG tablet Take 500 mcg by mouth every day.     • busPIRone (BUSPAR) 10 MG Tab Take 0.5 Tabs by mouth 2 times a day. 60 Tab 2   • baclofen (LIORESAL) 10 MG Tab Take 10 mg by mouth 2 times a day.     • liraglutide (VICTOZA) 18 MG/3ML Solution Pen-injector injection Inject 1.8 mg as instructed every day.     • ziprasidone (GEODON) 80 MG Cap Take 80 mg by mouth 2 Times  a Day.     • cholestyramine (QUESTRAN) 4 g packet Take 1 Packet by mouth every day.     • ivabradine (CORLANOR) 5 MG Tab tablet Take 1 Tab by mouth 2 times a day, with meals. 60 Tab 11   • sodium bicarbonate (SODIUM BICARBONATE) 650 MG Tab Take 650 mg by mouth 2 times a day.     • fluoxetine (PROZAC) 10 MG Cap TAKE ONE CAPSULE BY MOUTH ONCE A DAY 30 Cap 11   • aspirin EC (ECOTRIN) 81 MG Tablet Delayed Response Take 1 Tab by mouth every day. 30 Tab 6     No current facility-administered medications for this visit.          Allergies as of 09/18/2018 - Reviewed 09/07/2018   Allergen Reaction Noted   • Cefdinir Shortness of Breath and Itching 03/01/2016   • Depakote [divalproex sodium] Unspecified 06/14/2010   • Doxycycline Anaphylaxis and Vomiting 08/15/2012   • Abilify Unspecified 01/17/2013   • Amitriptyline Unspecified 10/31/2013   • Amoxicillin Rash 09/18/2010   • Ciprofloxacin Rash 12/17/2009   • Clindamycin Nausea 02/02/2011   • Ees [erythromycin] Vomiting and Nausea 08/28/2010   • Flagyl [metronidazole hcl] Unspecified 03/31/2011   • Flomax [tamsulosin hydrochloride] Swelling 09/24/2009   • Metformin Unspecified 07/23/2013   • Sulfa drugs Hives and Rash 09/18/2010   • Tape Rash 08/15/2012   • Vancomycin Itching 07/10/2016   • Wound dressing adhesive Hives 01/12/2018   • Cephalexin [keflex] Rash 01/01/2017   • Erythromycin Rash 03/30/2017   • Levofloxacin Unspecified 10/27/2016   • Metronidazole Rash 03/30/2017   • Valproic acid Rash 03/30/2017        ROS: Denies Chest pain, SOB, LE edema.    There were no vitals taken for this visit.    Physical Exam:  Gen:         Alert and oriented, No apparent distress.  Neck:        No Lymphadenopathy or Bruits.  Lungs:     Clear to auscultation bilaterally  CV:          Regular rate and rhythm. No murmurs, rubs or gallops.               Ext:          No clubbing, cyanosis, edema.      Assessment and Plan.   27 y.o. female with the following issues.    1. Pain in eye,  unspecified laterality  Clinically resolved follow along with specialist    2. Type 2 diabetes mellitus without complication, without long-term current use of insulin (HCC)  She is to stop the Januvia will get back on Victoza.          - Hospitalization and results reviewed with patient.   - Medications reviewed including instructions regarding high risk medications, dosing and side effects.

## 2018-09-21 LAB — TEST NAME 95000: NORMAL

## 2018-09-28 ENCOUNTER — PATIENT OUTREACH (OUTPATIENT)
Dept: HEALTH INFORMATION MANAGEMENT | Facility: OTHER | Age: 28
End: 2018-09-28

## 2018-09-28 ENCOUNTER — OFFICE VISIT (OUTPATIENT)
Dept: MEDICAL GROUP | Facility: MEDICAL CENTER | Age: 28
End: 2018-09-28
Payer: MEDICARE

## 2018-09-28 VITALS
RESPIRATION RATE: 16 BRPM | BODY MASS INDEX: 45.15 KG/M2 | HEIGHT: 65 IN | SYSTOLIC BLOOD PRESSURE: 104 MMHG | DIASTOLIC BLOOD PRESSURE: 70 MMHG | TEMPERATURE: 97.5 F | HEART RATE: 104 BPM | OXYGEN SATURATION: 92 % | WEIGHT: 271 LBS

## 2018-09-28 DIAGNOSIS — L98.8 LESION OF SKIN OF BREAST: ICD-10-CM

## 2018-09-28 DIAGNOSIS — G63 POLYNEUROPATHY ASSOCIATED WITH UNDERLYING DISEASE (HCC): Chronic | ICD-10-CM

## 2018-09-28 PROCEDURE — 99213 OFFICE O/P EST LOW 20 MIN: CPT | Performed by: FAMILY MEDICINE

## 2018-09-28 RX ORDER — CLINDAMYCIN PHOSPHATE 10 MG/G
1 GEL TOPICAL 2 TIMES DAILY
Qty: 1 TUBE | Refills: 0 | Status: SHIPPED | OUTPATIENT
Start: 2018-09-28 | End: 2018-10-10

## 2018-09-28 RX ORDER — PREGABALIN 100 MG/1
100 CAPSULE ORAL 2 TIMES DAILY
Qty: 60 CAP | Refills: 2 | Status: SHIPPED | OUTPATIENT
Start: 2018-09-28 | End: 2018-12-24

## 2018-09-28 NOTE — PROGRESS NOTES
Chief Complaint   Patient presents with   • Sore     on left breast x 1 week   • Leg Pain       Subjective:     HPI:   Kristin Balderrama presents today with the followin. Lesion of skin of breast  She has been on high-dose prednisone again.  Her skin is very thin.  She reacts poorly to most adhesives.  She has a tendency towards boils on the breasts.  She had a small 1 and a Band-Aid was placed on there.  When she removed the Band-Aid it took a piece of skin with it.  On examination this appears to be a 1 cm by half centimeter region of skin avulsion.  It is actively bleeding once the gauze is taken off.  She is using gauze and paper tape which she does not tend to react strongly to.  Her grandmother was concerned that there might be infection.  There is no erythema of the lesion or the surrounding skin.  There is no edema.  There is no purulent or serous discharge, just some dark venous blood.  This does not appear infected and I do not believe that antibiotics are indicated.  This is discussed with the patient who voices acceptance.    2. Polyneuropathy associated with underlying disease (HCC)  Patient has been on gabapentin but is finding that it is not as effective for her as it once was.  While she was in [facility] she was given Lyrica which she felt worked much better for her.  The dosing she states was 100 mg twice daily.  She would like to be changed to this.  Her kidney function is normal and I think this request is reasonable.  A prescription is written.  No history of abuse of this medication.  Patient has no history of illicit drug use.  She is using marijuana which is legal in this state for treatment of chronic pain.        Patient Active Problem List    Diagnosis Date Noted   • Cellulitis of left breast 2018     Priority: High   • Left leg weakness 2018     Priority: High   • Sinus tachycardia 10/31/2013     Priority: High   • Chronic inflammatory arthritis 2016      Priority: Medium   • Morbid obesity with BMI of 45.0-49.9, adult ([facility]) 10/24/2017     Priority: Low   • Depression 10/28/2016     Priority: Low   • Schizophrenia ([facility]) 10/27/2016     Priority: Low   • Psychosis, schizophrenia, simple ([facility]) 09/29/2016     Priority: Low     Class: Chronic   • Acquired hypothyroidism 11/23/2015     Priority: Low   • PCOS (polycystic ovarian syndrome) 11/23/2015     Priority: Low   • Progressive focal motor weakness 06/28/2015     Priority: Low   • Fatty liver disease, nonalcoholic 01/19/2015     Priority: Low   • Anxiety 12/16/2014     Priority: Low   • Knee pain, right 02/13/2014     Priority: Low   • Neurogenic bladder 04/02/2011     Priority: Low   • Chronic UTI 09/18/2010     Priority: Low   • Borderline personality disorder in adult 09/18/2010     Priority: Low   • Chronic respiratory failure with hypoxia, on home oxygen therapy ([facility]) 08/08/2018   • Transaminitis 08/08/2018   • TONYA (obstructive sleep apnea) 01/09/2018   • Functional diarrhea 01/05/2018   • Breast wound 11/06/2017   • Intractable episodic cluster headache 09/14/2017   • Rash 06/09/2017   • Hashimoto's encephalopathy 05/17/2017   • Fall at home 05/13/2017   • On home oxygen therapy 04/15/2017   • Chest pain 03/30/2017   • Weakness of right upper extremity 02/23/2017   • Chronic suprapubic catheter ([facility]) 02/16/2017   • Hypovitaminosis D 11/29/2016   • Chronic pain syndrome 10/27/2016   • Bowel and bladder incontinence 10/27/2016   • Galactorrhea 07/22/2016   • Elevated sedimentation rate 06/27/2016   • Weakness of both lower extremities 06/22/2016   • Vitamin D deficiency 05/21/2016   • Morbidly obese ([facility]) 03/07/2016   • Scoliosis 03/07/2016   • GERD (gastroesophageal reflux disease) 03/07/2016   • Peripheral neuropathy ([facility]) 03/06/2016   • H/O prior ablation treatment 02/10/2016       Current medicines (including changes today)  Current Outpatient Prescriptions   Medication Sig Dispense Refill   • clindamycin  (CLEOCIN T) 1 % Gel Apply 1 Application to affected area(s) 2 times a day. 1 Tube 0   • pregabalin (LYRICA) 100 MG Cap Take 1 Cap by mouth 2 times a day for 90 days. 60 Cap 2   • predniSONE (DELTASONE) 20 MG Tab Prednisone taper instructions:  60 mg x 7 days, Sept 12th-18th  50 mg x 3 days, Sept 19, 20, 21  40 mg x 3 days, Sept 22, 23,24  30 mg x 3 days, Sept 25, 26, 27  20 mg x 3 days, Sept 28, 29, 30  10 mg x 3 days, Oct 1, 2, 3 39 Tab 0   • predniSONE (DELTASONE) 10 MG Tab Prednisone taper instructions:  60 mg x 7 days, Sept 12th-18th  50 mg x 3 days, Sept 19, 20, 21  40 mg x 3 days, Sept 22, 23,24  30 mg x 3 days, Sept 25, 26, 27  20 mg x 3 days, Sept 28, 29, 30  10 mg x 3 days, Oct 1, 2, 3 9 Tab 0   • cholestyramine (QUESTRAN,PREVALITE) 4 GM Pack Take 4 g by mouth every morning.     • melatonin 3 MG Tab Take 6 mg by mouth every bedtime.     • traZODone (DESYREL) 100 MG Tab Take 100 mg by mouth every bedtime.     • vitamin D (CHOLECALCIFEROL) 1000 UNIT Tab Take 1,000 Units by mouth every day.     • triamcinolone acetonide (KENALOG) 0.1 % Cream Apply 1 g to affected area(s) 2 times a day. To left breast  And LUE     • nystatin (MYCOSTATIN) 050657 UNIT/GM Cream topical cream Apply 0.0001 g to affected area(s) 2 times a day. 1 Tube 3   • furosemide (LASIX) 40 MG Tab Take 40 mg by mouth every day.     • cyanocobalamin ([employer] VITAMIN B-12) 500 MCG tablet Take 500 mcg by mouth every day.     • busPIRone (BUSPAR) 10 MG Tab Take 0.5 Tabs by mouth 2 times a day. 60 Tab 2   • baclofen (LIORESAL) 10 MG Tab Take 10 mg by mouth 2 times a day.     • liraglutide (VICTOZA) 18 MG/3ML Solution Pen-injector injection Inject 1.8 mg as instructed every day.     • ziprasidone (GEODON) 80 MG Cap Take 80 mg by mouth 2 Times a Day.     • cholestyramine (QUESTRAN) 4 g packet Take 1 Packet by mouth every day.     • ivabradine (CORLANOR) 5 MG Tab tablet Take 1 Tab by mouth 2 times a day, with meals. 60 Tab 11   • sodium bicarbonate (SODIUM  "BICARBONATE) 650 MG Tab Take 650 mg by mouth 2 times a day.     • fluoxetine (PROZAC) 10 MG Cap TAKE ONE CAPSULE BY MOUTH ONCE A DAY 30 Cap 11   • aspirin EC (ECOTRIN) 81 MG Tablet Delayed Response Take 1 Tab by mouth every day. 30 Tab 6     No current facility-administered medications for this visit.        Allergies   Allergen Reactions   • Cefdinir Shortness of Breath and Itching     Tolerated 1/18/17  Tolerates ceftriaxone    • Depakote [Divalproex Sodium] Unspecified     Muscle spasms/muscle pain and weakness     • Doxycycline Anaphylaxis and Vomiting     RXN=unknown   • Abilify Unspecified     Headaches/muscle twitching     • Amitriptyline Unspecified     Headaches     • Amoxicillin Rash     Pt states \"I get a rash\".     • Ciprofloxacin Rash     Pt states \"I get a rash\".     • Clindamycin Nausea     Even with food     • Ees [Erythromycin] Vomiting and Nausea   • Flagyl [Metronidazole Hcl] Unspecified     \"eye problems\"     • Flomax [Tamsulosin Hydrochloride] Swelling   • Metformin Unspecified     Increased lactic acid      • Sulfa Drugs Hives and Rash     RXN=since childhood   • Tape Rash     Tears skin off   coban with Tegaderm tape ok  RXN=ongoing   • Vancomycin Itching     Pt becomes flushed in face and chest.   RXN=7/10/16   • Wound Dressing Adhesive Hives     By pt report   • Cephalexin [Keflex] Rash     Pt states she gets a rash with this medication  Tolerates ceftriaxone   • Erythromycin Rash     .   • Levofloxacin Unspecified     Leg muscle cramps   • Metronidazole Rash     .   • Valproic Acid Rash     .       ROS: As per HPI       Objective:     Blood pressure 104/70, pulse (!) 104, temperature 36.4 °C (97.5 °F), resp. rate 16, height 1.651 m (5' 5\"), weight 122.9 kg (271 lb), SpO2 92 %, not currently breastfeeding. Body mass index is 45.1 kg/m².    Physical Exam:  Constitutional: Well-developed and well-nourished. Not diaphoretic. No distress. Lucid and fluent.  Skin: Skin is warm and dry. No rash " noted.  Shallow skin avulsion left breast approximately 1 cm by half centimeter.  Scant amount of dark blood on her current gauze and Telfa.  When this is removed she bleeds sluggishly.  There is dark blood.  No purulence or serous drainage appreciated.  There is no erythema of the lesion or surrounding erythema or edema.  Head: Atraumatic without lesions.  Eyes: Conjunctivae and extraocular motions are normal. Pupils are equal, round, and reactive to light. No scleral icterus.   Mouth/Throat: Tongue normal. Oropharynx is clear and moist. Posterior pharynx without erythema or exudates.  Neck: Supple, trachea midline. No thyromegaly present. No cervical or supraclavicular lymphadenopathy. No JVD or carotid bruits appreciated  Cardiovascular: Regular rate and rhythm.  Normal S1, S2 without murmur appreciated.  Chest: Effort normal. Clear to auscultation throughout. No adventitious sounds.   Extremities: No cyanosis, clubbing, erythema, nor edema appreciated.  Neurological: Alert and oriented x 3.  She is using her walker.  Gait is forward leaning and broad.  Psychiatric:  Behavior, mood, and affect are appropriate.       Assessment and Plan:     27 y.o. female with the following issues:    1. Lesion of skin of breast  clindamycin (CLEOCIN T) 1 % Gel   2. Polyneuropathy associated with underlying disease (HCC)  pregabalin (LYRICA) 100 MG Cap         Followup: Return if symptoms worsen or fail to improve.

## 2018-10-01 ENCOUNTER — OFFICE VISIT (OUTPATIENT)
Dept: CARDIOLOGY | Facility: MEDICAL CENTER | Age: 28
End: 2018-10-01
Payer: MEDICARE

## 2018-10-01 VITALS
OXYGEN SATURATION: 97 % | SYSTOLIC BLOOD PRESSURE: 118 MMHG | HEIGHT: 65 IN | BODY MASS INDEX: 44.98 KG/M2 | HEART RATE: 76 BPM | DIASTOLIC BLOOD PRESSURE: 80 MMHG | WEIGHT: 270 LBS

## 2018-10-01 DIAGNOSIS — R00.2 PALPITATIONS: ICD-10-CM

## 2018-10-01 DIAGNOSIS — R00.0 SINUS TACHYCARDIA: Chronic | ICD-10-CM

## 2018-10-01 DIAGNOSIS — Z98.890 H/O PRIOR ABLATION TREATMENT: ICD-10-CM

## 2018-10-01 PROCEDURE — 99214 OFFICE O/P EST MOD 30 MIN: CPT | Performed by: INTERNAL MEDICINE

## 2018-10-01 ASSESSMENT — ENCOUNTER SYMPTOMS
SHORTNESS OF BREATH: 0
PALPITATIONS: 0
COUGH: 0
DIZZINESS: 0

## 2018-10-01 NOTE — PROGRESS NOTES
"Chief Complaint   Patient presents with   • Shortness of Breath       Subjective:   Kristin Balderrama is a 27 y.o. female who presents today for sinus tachycardia, ablation 2016 with post ablation PAF on chronic Corlonar therapy.    Last seen by Dr. Torres Kumar on 2/23/2018.    Since 2/23/2018 appointment the patient said no cardiac symptoms.  Feels that Corlonar therapy has been quite effective.  On chronic oxygen therapy.    Past Medical History:   Diagnosis Date   • Abdominal pain    • Anginal syndrome     random chest pain especially with tachycardia   • Apnea, sleep    • Arrhythmia     \"sinus tachycardia\", cariologist, Dr. Kumar; ablation 2/2016   • Arthritis     osteo   • ASTHMA     when around smoke   • Atrial fibrillation (HCC)    • Back pain    • Borderline personality disorder (HCC)    • Breath shortness     with tachycardia   • Cardiac arrhythmia    • Chickenpox    • Chronic UTI 9/18/2010   • Cough    • Daytime sleepiness    • Depression    • Diabetes (HCC)    • Diarrhea    • Disorder of thyroid    • Fall    • Fatigue    • Frequent headaches    • Gasping for breath    • Gynecological disorder     PCOS   • Headache(784.0)    • Heart burn    • History of falling    • Hypertension    • Migraine    • 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HCC)    • 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HCC)    • Nausea    • Obesity    • Pain 08-15-12    back, 7/10   • Painful joint    • PCOS (polycystic ovarian syndrome)    • Pneumonia 2012   • Psychosis (HCC)    • Renal disorder     \"kidney disease, stage 1\" nephrologist, Dr. 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   • Urinary bladder disorder     Suprapubic cath   • Urinary incontinence    • Weakness    • Wears glasses      Past Surgical History:   Procedure Laterality Date "   • MUSCLE BIOPSY Right 1/26/2017    Procedure: MUSCLE BIOPSY - THIGH;  Surgeon: Isidro Vigil M.D.;  Location: SURGERY [facility];  Service:    • GASTROSCOPY WITH BALLOON DILATATION N/A 1/4/2017    Procedure: GASTROSCOPY WITH DILATATION;  Surgeon: Torres Vargas M.D.;  Location: SURGERY [facility];  Service:    • BOWEL STIMULATOR INSERTION  7/13/2016    Procedure: BOWEL STIMULATOR INSERTION FOR PERMANENT INTERSTIM SACRAL IMPLANT;  Surgeon: Joe Noyola M.D.;  Location: SURGERY [facility];  Service:    • RECOVERY  1/27/2016    Procedure: CATH LAB EP STUDY WITH SINUS NODE MODIFICATION LA;  Surgeon: [facility];  Location: SURGERY PRE-POST PROC UNIT [facility];  Service:    • OTHER CARDIAC SURGERY  1/2016    cardiac ablation   • NEURO DEST FACET L/S W/IG SNGL  4/21/2015    Performed by Reza Tabor at SURGERY [facility]   • LUMBAR FUSION ANTERIOR  8/21/2012    Performed by SUSIE SAWANT at SURGERY [facility]   • ALYSSA BY LAPAROSCOPY  8/29/2010    Performed by SHAYY JOHNS at SURGERY [facility]   • LAMINOTOMY     • OTHER ABDOMINAL SURGERY     • TONSILLECTOMY      tonsillectomy     Family History   Problem Relation Age of Onset   • Hypertension Mother    • Sleep Apnea Mother    • Heart Disease Mother    • Other Mother         hypothryod   • Hypertension Maternal Uncle    • Heart Disease Maternal Grandmother    • Hypertension Maternal Grandmother    • No Known Problems Sister    • Other Sister         Narcolepsy;fibromyalsia;bone;nerve   • Genitourinary () Sister         endometriosis     Social History     Social History   • Marital status: Single     Spouse name: N/A   • Number of children: N/A   • Years of education: N/A     Occupational History   • Not on file.     Social History Main Topics   • Smoking status: Never Smoker   • Smokeless tobacco: Never Used   • Alcohol use No   • Drug use: Yes     Types: Marijuana, Inhaled      Comment: Medicinal edible's   • Sexual  "activity: Not Currently     Birth control/ protection: Pill     Other Topics Concern   • Not on file     Social History Narrative    ** Merged History Encounter **          Allergies   Allergen Reactions   • Cefdinir Shortness of Breath and Itching     Tolerated 1/18/17  Tolerates ceftriaxone    • Depakote [Divalproex Sodium] Unspecified     Muscle spasms/muscle pain and weakness     • Doxycycline Anaphylaxis and Vomiting     RXN=unknown   • Abilify Unspecified     Headaches/muscle twitching     • Amitriptyline Unspecified     Headaches     • Amoxicillin Rash     Pt states \"I get a rash\".     • Ciprofloxacin Rash     Pt states \"I get a rash\".     • Clindamycin Nausea     Even with food     • Ees [Erythromycin] Vomiting and Nausea   • Flagyl [Metronidazole Hcl] Unspecified     \"eye problems\"     • Flomax [Tamsulosin Hydrochloride] Swelling   • Metformin Unspecified     Increased lactic acid      • Sulfa Drugs Hives and Rash     RXN=since childhood   • Tape Rash     Tears skin off   coban with Tegaderm tape ok  RXN=ongoing   • Vancomycin Itching     Pt becomes flushed in face and chest.   RXN=7/10/16   • Wound Dressing Adhesive Hives     By pt report   • Cephalexin [Keflex] Rash     Pt states she gets a rash with this medication  Tolerates ceftriaxone   • Erythromycin Rash     .   • Levofloxacin Unspecified     Leg muscle cramps   • Metronidazole Rash     .   • Valproic Acid Rash     .     Outpatient Encounter Prescriptions as of 10/1/2018   Medication Sig Dispense Refill   • clindamycin (CLEOCIN T) 1 % Gel Apply 1 Application to affected area(s) 2 times a day. 1 Tube 0   • pregabalin (LYRICA) 100 MG Cap Take 1 Cap by mouth 2 times a day for 90 days. 60 Cap 2   • predniSONE (DELTASONE) 20 MG Tab Prednisone taper instructions:  60 mg x 7 days, Sept 12th-18th  50 mg x 3 days, Sept 19, 20, 21  40 mg x 3 days, Sept 22, 23,24  30 mg x 3 days, Sept 25, 26, 27  20 mg x 3 days, Sept 28, 29, 30  10 mg x 3 days, Oct 1, 2, 3 39 " Tab 0   • predniSONE (DELTASONE) 10 MG Tab Prednisone taper instructions:  60 mg x 7 days, Sept 12th-18th  50 mg x 3 days, Sept 19, 20, 21  40 mg x 3 days, Sept 22, 23,24  30 mg x 3 days, Sept 25, 26, 27  20 mg x 3 days, Sept 28, 29, 30  10 mg x 3 days, Oct 1, 2, 3 9 Tab 0   • cholestyramine (QUESTRAN,PREVALITE) 4 GM Pack Take 4 g by mouth every morning.     • melatonin 3 MG Tab Take 6 mg by mouth every bedtime.     • traZODone (DESYREL) 100 MG Tab Take 100 mg by mouth every bedtime.     • vitamin D (CHOLECALCIFEROL) 1000 UNIT Tab Take 1,000 Units by mouth every day.     • triamcinolone acetonide (KENALOG) 0.1 % Cream Apply 1 g to affected area(s) 2 times a day. To left breast  And LUE     • nystatin (MYCOSTATIN) 051860 UNIT/GM Cream topical cream Apply 0.0001 g to affected area(s) 2 times a day. 1 Tube 3   • furosemide (LASIX) 40 MG Tab Take 40 mg by mouth every day.     • cyanocobalamin ([employer] VITAMIN B-12) 500 MCG tablet Take 500 mcg by mouth every day.     • busPIRone (BUSPAR) 10 MG Tab Take 0.5 Tabs by mouth 2 times a day. 60 Tab 2   • baclofen (LIORESAL) 10 MG Tab Take 10 mg by mouth 2 times a day.     • liraglutide (VICTOZA) 18 MG/3ML Solution Pen-injector injection Inject 1.8 mg as instructed every day.     • ziprasidone (GEODON) 80 MG Cap Take 80 mg by mouth 2 Times a Day.     • cholestyramine (QUESTRAN) 4 g packet Take 1 Packet by mouth every day.     • ivabradine (CORLANOR) 5 MG Tab tablet Take 1 Tab by mouth 2 times a day, with meals. 60 Tab 11   • sodium bicarbonate (SODIUM BICARBONATE) 650 MG Tab Take 650 mg by mouth 2 times a day.     • fluoxetine (PROZAC) 10 MG Cap TAKE ONE CAPSULE BY MOUTH ONCE A DAY 30 Cap 11   • aspirin EC (ECOTRIN) 81 MG Tablet Delayed Response Take 1 Tab by mouth every day. 30 Tab 6     No facility-administered encounter medications on file as of 10/1/2018.      Review of Systems   Respiratory: Negative for cough and shortness of breath.    Cardiovascular: Negative for chest pain  "and palpitations.   Neurological: Negative for dizziness.        Objective:   /80 (BP Location: Left arm, Patient Position: Sitting, BP Cuff Size: Adult)   Pulse 76   Ht 1.651 m (5' 5\")   Wt 122.5 kg (270 lb)   SpO2 97%   BMI 44.93 kg/m²     Physical Exam   Constitutional: She is oriented to person, place, and time. She appears well-developed and well-nourished.   Nasal cannula.   Neck: No JVD present.   Cardiovascular: Normal rate, regular rhythm, normal heart sounds and intact distal pulses.    Pulmonary/Chest: Effort normal and breath sounds normal. No respiratory distress. She has no wheezes. She has no rales.   Abdominal:   Markedly protuberant.   Musculoskeletal: She exhibits no edema.   Neurological: She is alert and oriented to person, place, and time.   Skin: Skin is warm and dry.   Psychiatric: She has a normal mood and affect. Her behavior is normal.     03/01/2017 ECHOCARDIOGRAM  Left ventricular ejection fraction 60%.  No valvular disease.    08/13/2018 EKG: Normal sinus rhythm, rate 83.  Reviewed by myself.    Assessment:     1. Sinus tachycardia     2. Palpitations     3. H/O prior ablation treatment         Medical Decision Making:  Today's Assessment / Status / Plan:     1.  The patient's current cardiac status is stable.  2.  Continue current cardiac therapy.  3.  Follow-up with EP in 6 months.  "

## 2018-10-03 ENCOUNTER — OFFICE VISIT (OUTPATIENT)
Dept: MEDICAL GROUP | Facility: MEDICAL CENTER | Age: 28
End: 2018-10-03
Payer: MEDICARE

## 2018-10-03 VITALS
SYSTOLIC BLOOD PRESSURE: 124 MMHG | WEIGHT: 275.57 LBS | TEMPERATURE: 97.8 F | HEIGHT: 65 IN | DIASTOLIC BLOOD PRESSURE: 76 MMHG | BODY MASS INDEX: 45.91 KG/M2 | HEART RATE: 86 BPM | OXYGEN SATURATION: 95 %

## 2018-10-03 DIAGNOSIS — N61.0 CELLULITIS OF LEFT BREAST: ICD-10-CM

## 2018-10-03 PROCEDURE — 99213 OFFICE O/P EST LOW 20 MIN: CPT | Performed by: INTERNAL MEDICINE

## 2018-10-03 RX ORDER — SULFAMETHOXAZOLE AND TRIMETHOPRIM 800; 160 MG/1; MG/1
1 TABLET ORAL 2 TIMES DAILY
Qty: 10 TAB | Refills: 0 | Status: SHIPPED
Start: 2018-10-03 | End: 2018-10-04 | Stop reason: SDUPTHER

## 2018-10-03 NOTE — PROGRESS NOTES
CC: Breast infection peer    HPI:   Kristin presents today with the following.    1. Cellulitis of left breast  Presents after recent infection of the breast.  She was seen 2 days ago by an office provider and placed on clindamycin cream.  She reports the redness has spread slightly.  There is no drainage no fevers or chills no obvious lymph nodes anywhere.  She has had a serious breast infection in the past where she received IV antibiotics.  She has multiple allergies listed but most of them are nausea.  When reporting sulfa allergy she has been on Bactroban cream that did not cause any problems but again is not taking care of the infection.      Patient Active Problem List    Diagnosis Date Noted   • Cellulitis of left breast 08/03/2018     Priority: High   • Left leg weakness 07/14/2018     Priority: High   • Sinus tachycardia 10/31/2013     Priority: High   • Chronic inflammatory arthritis 05/23/2016     Priority: Medium   • Morbid obesity with BMI of 45.0-49.9, adult ([facility]) 10/24/2017     Priority: Low   • Depression 10/28/2016     Priority: Low   • Schizophrenia ([facility]) 10/27/2016     Priority: Low   • Psychosis, schizophrenia, simple ([facility]) 09/29/2016     Priority: Low     Class: Chronic   • Acquired hypothyroidism 11/23/2015     Priority: Low   • PCOS (polycystic ovarian syndrome) 11/23/2015     Priority: Low   • Progressive focal motor weakness 06/28/2015     Priority: Low   • Fatty liver disease, nonalcoholic 01/19/2015     Priority: Low   • Anxiety 12/16/2014     Priority: Low   • Knee pain, right 02/13/2014     Priority: Low   • Neurogenic bladder 04/02/2011     Priority: Low   • Chronic UTI 09/18/2010     Priority: Low   • Borderline personality disorder in adult ([facility]) 09/18/2010     Priority: Low   • Palpitations 10/01/2018   • Chronic respiratory failure with hypoxia, on home oxygen therapy ([facility]) 08/08/2018   • Transaminitis 08/08/2018   • TONYA (obstructive sleep apnea) 01/09/2018   • Functional diarrhea  01/05/2018   • Breast wound 11/06/2017   • Intractable episodic cluster headache 09/14/2017   • Rash 06/09/2017   • Hashimoto's encephalopathy 05/17/2017   • Fall at home 05/13/2017   • On home oxygen therapy 04/15/2017   • Chest pain 03/30/2017   • Weakness of right upper extremity 02/23/2017   • Chronic suprapubic catheter ([facility]) 02/16/2017   • Hypovitaminosis D 11/29/2016   • Chronic pain syndrome 10/27/2016   • Bowel and bladder incontinence 10/27/2016   • Galactorrhea 07/22/2016   • Elevated sedimentation rate 06/27/2016   • Weakness of both lower extremities 06/22/2016   • Vitamin D deficiency 05/21/2016   • Morbidly obese ([facility]) 03/07/2016   • Scoliosis 03/07/2016   • GERD (gastroesophageal reflux disease) 03/07/2016   • Peripheral neuropathy ([facility]) 03/06/2016   • H/O prior ablation treatment 02/10/2016       Current Outpatient Prescriptions   Medication Sig Dispense Refill   • sulfamethoxazole-trimethoprim (BACTRIM DS) 800-160 MG tablet Take 1 Tab by mouth 2 times a day for 5 days. 10 Tab 0   • cholestyramine (QUESTRAN,PREVALITE) 4 GM Pack Take 4 g by mouth every morning.     • clindamycin (CLEOCIN T) 1 % Gel Apply 1 Application to affected area(s) 2 times a day. 1 Tube 0   • pregabalin (LYRICA) 100 MG Cap Take 1 Cap by mouth 2 times a day for 90 days. 60 Cap 2   • predniSONE (DELTASONE) 20 MG Tab Prednisone taper instructions:  60 mg x 7 days, Sept 12th-18th  50 mg x 3 days, Sept 19, 20, 21  40 mg x 3 days, Sept 22, 23,24  30 mg x 3 days, Sept 25, 26, 27  20 mg x 3 days, Sept 28, 29, 30  10 mg x 3 days, Oct 1, 2, 3 (Patient not taking: Reported on 10/3/2018) 39 Tab 0   • predniSONE (DELTASONE) 10 MG Tab Prednisone taper instructions:  60 mg x 7 days, Sept 12th-18th  50 mg x 3 days, Sept 19, 20, 21  40 mg x 3 days, Sept 22, 23,24  30 mg x 3 days, Sept 25, 26, 27  20 mg x 3 days, Sept 28, 29, 30  10 mg x 3 days, Oct 1, 2, 3 (Patient not taking: Reported on 10/3/2018) 9 Tab 0   • melatonin 3 MG Tab Take 6 mg  by mouth every bedtime.     • traZODone (DESYREL) 100 MG Tab Take 100 mg by mouth every bedtime.     • vitamin D (CHOLECALCIFEROL) 1000 UNIT Tab Take 1,000 Units by mouth every day.     • triamcinolone acetonide (KENALOG) 0.1 % Cream Apply 1 g to affected area(s) 2 times a day. To left breast  And LUE     • nystatin (MYCOSTATIN) 564376 UNIT/GM Cream topical cream Apply 0.0001 g to affected area(s) 2 times a day. 1 Tube 3   • furosemide (LASIX) 40 MG Tab Take 40 mg by mouth every day.     • cyanocobalamin ([employer] VITAMIN B-12) 500 MCG tablet Take 500 mcg by mouth every day.     • busPIRone (BUSPAR) 10 MG Tab Take 0.5 Tabs by mouth 2 times a day. 60 Tab 2   • baclofen (LIORESAL) 10 MG Tab Take 10 mg by mouth 2 times a day.     • liraglutide (VICTOZA) 18 MG/3ML Solution Pen-injector injection Inject 1.8 mg as instructed every day.     • ziprasidone (GEODON) 80 MG Cap Take 80 mg by mouth 2 Times a Day.     • cholestyramine (QUESTRAN) 4 g packet Take 1 Packet by mouth every day.     • ivabradine (CORLANOR) 5 MG Tab tablet Take 1 Tab by mouth 2 times a day, with meals. 60 Tab 11   • sodium bicarbonate (SODIUM BICARBONATE) 650 MG Tab Take 650 mg by mouth 2 times a day.     • fluoxetine (PROZAC) 10 MG Cap TAKE ONE CAPSULE BY MOUTH ONCE A DAY 30 Cap 11   • aspirin EC (ECOTRIN) 81 MG Tablet Delayed Response Take 1 Tab by mouth every day. 30 Tab 6     No current facility-administered medications for this visit.          Allergies as of 10/03/2018 - Reviewed 10/03/2018   Allergen Reaction Noted   • Cefdinir Shortness of Breath and Itching 03/01/2016   • Depakote [divalproex sodium] Unspecified 06/14/2010   • Doxycycline Anaphylaxis and Vomiting 08/15/2012   • Abilify Unspecified 01/17/2013   • Amitriptyline Unspecified 10/31/2013   • Amoxicillin Rash 09/18/2010   • Ciprofloxacin Rash 12/17/2009   • Clindamycin Nausea 02/02/2011   • Ees [erythromycin] Vomiting and Nausea 08/28/2010   • Flagyl [metronidazole hcl] Unspecified  "03/31/2011   • Flomax [tamsulosin hydrochloride] Swelling 09/24/2009   • Metformin Unspecified 07/23/2013   • Sulfa drugs Hives and Rash 09/18/2010   • Tape Rash 08/15/2012   • Vancomycin Itching 07/10/2016   • Wound dressing adhesive Hives 01/12/2018   • Cephalexin [keflex] Rash 01/01/2017   • Erythromycin Rash 03/30/2017   • Levofloxacin Unspecified 10/27/2016   • Metronidazole Rash 03/30/2017   • Valproic acid Rash 03/30/2017        ROS: Denies Chest pain, SOB, LE edema.    /76 (BP Location: Right arm, BP Cuff Size: Large adult)   Pulse 86   Temp 36.6 °C (97.8 °F)   Ht 1.651 m (5' 5\")   Wt 125 kg (275 lb 9.2 oz)   LMP 08/19/2018 (Approximate)   SpO2 95%   BMI 45.86 kg/m²     Physical Exam:  Gen:         Alert and oriented, No apparent distress.  Neck:        No Lymphadenopathy or Bruits.  Lungs:     Clear to auscultation bilaterally  CV:          Regular rate and rhythm. No murmurs, rubs or gallops.               Ext:          No clubbing, cyanosis, edema.      Assessment and Plan.   27 y.o. female with the following issues.    1. Cellulitis of left breast  Some slight redness around the central scars which she reports is worsened have placed on Bactrim despite her allergies given the lack of feasible alternatives that she is allergic to all other antibiotics that would be possibly effective.  Have cautioned about shortness of breath to go to the ER immediately.  - sulfamethoxazole-trimethoprim (BACTRIM DS) 800-160 MG tablet; Take 1 Tab by mouth 2 times a day for 5 days.  Dispense: 10 Tab; Refill: 0      "

## 2018-10-04 ENCOUNTER — TELEPHONE (OUTPATIENT)
Dept: MEDICAL GROUP | Facility: MEDICAL CENTER | Age: 28
End: 2018-10-04

## 2018-10-04 DIAGNOSIS — N61.0 CELLULITIS OF LEFT BREAST: ICD-10-CM

## 2018-10-04 RX ORDER — SULFAMETHOXAZOLE AND TRIMETHOPRIM 800; 160 MG/1; MG/1
1 TABLET ORAL 2 TIMES DAILY
Qty: 10 TAB | Refills: 0 | Status: SHIPPED | OUTPATIENT
Start: 2018-10-04 | End: 2018-10-04 | Stop reason: SDUPTHER

## 2018-10-04 RX ORDER — SULFAMETHOXAZOLE AND TRIMETHOPRIM 800; 160 MG/1; MG/1
TABLET ORAL
Qty: 10 TAB | Refills: 0 | Status: SHIPPED | OUTPATIENT
Start: 2018-10-04 | End: 2018-10-10 | Stop reason: SDUPTHER

## 2018-10-04 NOTE — TELEPHONE ENCOUNTER
1. Caller Name: Kristin Balderrama                  Call Back Number: [phone] (home)         Patient approves a detailed voicemail message: yes    PT called in stated she just started to take the antibiotics this evening and her pulse is 115 BP is 140/115 and the redness is getting worse. I advised her to go to urgent care. She would not tell me if she was going to go or not.    Please advise

## 2018-10-05 ENCOUNTER — APPOINTMENT (OUTPATIENT)
Dept: SLEEP MEDICINE | Facility: MEDICAL CENTER | Age: 28
End: 2018-10-05
Payer: MEDICARE

## 2018-10-05 NOTE — TELEPHONE ENCOUNTER
Called PT let her know provider stated one dose of antibiotic is not going to change anything, she needs to give the antibiotic time to work.

## 2018-10-10 ENCOUNTER — OFFICE VISIT (OUTPATIENT)
Dept: MEDICAL GROUP | Facility: MEDICAL CENTER | Age: 28
End: 2018-10-10
Payer: MEDICARE

## 2018-10-10 VITALS
HEIGHT: 65 IN | RESPIRATION RATE: 16 BRPM | OXYGEN SATURATION: 91 % | DIASTOLIC BLOOD PRESSURE: 62 MMHG | SYSTOLIC BLOOD PRESSURE: 108 MMHG | HEART RATE: 93 BPM | BODY MASS INDEX: 44.82 KG/M2 | WEIGHT: 269 LBS | TEMPERATURE: 98.1 F

## 2018-10-10 DIAGNOSIS — N61.0 CELLULITIS OF LEFT BREAST: ICD-10-CM

## 2018-10-10 PROCEDURE — 99213 OFFICE O/P EST LOW 20 MIN: CPT | Performed by: INTERNAL MEDICINE

## 2018-10-10 RX ORDER — SULFAMETHOXAZOLE AND TRIMETHOPRIM 800; 160 MG/1; MG/1
1 TABLET ORAL 2 TIMES DAILY
Qty: 10 TAB | Refills: 0 | Status: SHIPPED | OUTPATIENT
Start: 2018-10-10 | End: 2018-10-16

## 2018-10-11 NOTE — PROGRESS NOTES
CC: Breast redness.    HPI:   Kristin presents today with the following.    1. Cellulitis of left breast  Resents after being 5 days of Bactrim.  Supposedly allergic to sulfa drugs.  She reports a faint rash that is not obviously visible.  No itching and no shortness of breath.  She has no fevers or chills.  The redness does not spread she still has slowly healing wounds on the breast.  She denies any thick drainage and again no other symptomology.      Patient Active Problem List    Diagnosis Date Noted   • Cellulitis of left breast 08/03/2018     Priority: High   • Left leg weakness 07/14/2018     Priority: High   • Sinus tachycardia 10/31/2013     Priority: High   • Chronic inflammatory arthritis 05/23/2016     Priority: Medium   • Morbid obesity with BMI of 45.0-49.9, adult ([facility]) 10/24/2017     Priority: Low   • Depression 10/28/2016     Priority: Low   • Schizophrenia ([facility]) 10/27/2016     Priority: Low   • Psychosis, schizophrenia, simple ([facility]) 09/29/2016     Priority: Low     Class: Chronic   • Acquired hypothyroidism 11/23/2015     Priority: Low   • PCOS (polycystic ovarian syndrome) 11/23/2015     Priority: Low   • Progressive focal motor weakness 06/28/2015     Priority: Low   • Fatty liver disease, nonalcoholic 01/19/2015     Priority: Low   • Anxiety 12/16/2014     Priority: Low   • Knee pain, right 02/13/2014     Priority: Low   • Neurogenic bladder 04/02/2011     Priority: Low   • Chronic UTI 09/18/2010     Priority: Low   • Borderline personality disorder in adult ([facility]) 09/18/2010     Priority: Low   • Palpitations 10/01/2018   • Chronic respiratory failure with hypoxia, on home oxygen therapy ([facility]) 08/08/2018   • Transaminitis 08/08/2018   • TONYA (obstructive sleep apnea) 01/09/2018   • Functional diarrhea 01/05/2018   • Breast wound 11/06/2017   • Intractable episodic cluster headache 09/14/2017   • Rash 06/09/2017   • Hashimoto's encephalopathy 05/17/2017   • Fall at home 05/13/2017   • On home  oxygen therapy 04/15/2017   • Chest pain 03/30/2017   • Weakness of right upper extremity 02/23/2017   • Chronic suprapubic catheter ([facility]) 02/16/2017   • Hypovitaminosis D 11/29/2016   • Chronic pain syndrome 10/27/2016   • Bowel and bladder incontinence 10/27/2016   • Galactorrhea 07/22/2016   • Elevated sedimentation rate 06/27/2016   • Weakness of both lower extremities 06/22/2016   • Vitamin D deficiency 05/21/2016   • Morbidly obese ([facility]) 03/07/2016   • Scoliosis 03/07/2016   • GERD (gastroesophageal reflux disease) 03/07/2016   • Peripheral neuropathy ([facility]) 03/06/2016   • H/O prior ablation treatment 02/10/2016       Current Outpatient Prescriptions   Medication Sig Dispense Refill   • sulfamethoxazole-trimethoprim (BACTRIM DS) 800-160 MG tablet Take 1 Tab by mouth 2 times a day. FOR 5 DAYS 10 Tab 0   • pregabalin (LYRICA) 100 MG Cap Take 1 Cap by mouth 2 times a day for 90 days. 60 Cap 2   • predniSONE (DELTASONE) 20 MG Tab Prednisone taper instructions:  60 mg x 7 days, Sept 12th-18th  50 mg x 3 days, Sept 19, 20, 21  40 mg x 3 days, Sept 22, 23,24  30 mg x 3 days, Sept 25, 26, 27  20 mg x 3 days, Sept 28, 29, 30  10 mg x 3 days, Oct 1, 2, 3 (Patient not taking: Reported on 10/3/2018) 39 Tab 0   • predniSONE (DELTASONE) 10 MG Tab Prednisone taper instructions:  60 mg x 7 days, Sept 12th-18th  50 mg x 3 days, Sept 19, 20, 21  40 mg x 3 days, Sept 22, 23,24  30 mg x 3 days, Sept 25, 26, 27  20 mg x 3 days, Sept 28, 29, 30  10 mg x 3 days, Oct 1, 2, 3 (Patient not taking: Reported on 10/3/2018) 9 Tab 0   • cholestyramine (QUESTRAN,PREVALITE) 4 GM Pack Take 4 g by mouth every morning.     • melatonin 3 MG Tab Take 6 mg by mouth every bedtime.     • traZODone (DESYREL) 100 MG Tab Take 100 mg by mouth every bedtime.     • vitamin D (CHOLECALCIFEROL) 1000 UNIT Tab Take 1,000 Units by mouth every day.     • triamcinolone acetonide (KENALOG) 0.1 % Cream Apply 1 g to affected area(s) 2 times a day. To left  breast  And LUE     • nystatin (MYCOSTATIN) 700581 UNIT/GM Cream topical cream Apply 0.0001 g to affected area(s) 2 times a day. 1 Tube 3   • furosemide (LASIX) 40 MG Tab Take 40 mg by mouth every day.     • cyanocobalamin ([employer] VITAMIN B-12) 500 MCG tablet Take 500 mcg by mouth every day.     • busPIRone (BUSPAR) 10 MG Tab Take 0.5 Tabs by mouth 2 times a day. 60 Tab 2   • baclofen (LIORESAL) 10 MG Tab Take 10 mg by mouth 2 times a day.     • liraglutide (VICTOZA) 18 MG/3ML Solution Pen-injector injection Inject 1.8 mg as instructed every day.     • ziprasidone (GEODON) 80 MG Cap Take 80 mg by mouth 2 Times a Day.     • cholestyramine (QUESTRAN) 4 g packet Take 1 Packet by mouth every day.     • ivabradine (CORLANOR) 5 MG Tab tablet Take 1 Tab by mouth 2 times a day, with meals. 60 Tab 11   • sodium bicarbonate (SODIUM BICARBONATE) 650 MG Tab Take 650 mg by mouth 2 times a day.     • fluoxetine (PROZAC) 10 MG Cap TAKE ONE CAPSULE BY MOUTH ONCE A DAY 30 Cap 11   • aspirin EC (ECOTRIN) 81 MG Tablet Delayed Response Take 1 Tab by mouth every day. 30 Tab 6     No current facility-administered medications for this visit.          Allergies as of 10/10/2018 - Reviewed 10/10/2018   Allergen Reaction Noted   • Cefdinir Shortness of Breath and Itching 03/01/2016   • Depakote [divalproex sodium] Unspecified 06/14/2010   • Doxycycline Anaphylaxis and Vomiting 08/15/2012   • Abilify Unspecified 01/17/2013   • Amitriptyline Unspecified 10/31/2013   • Amoxicillin Rash 09/18/2010   • Ciprofloxacin Rash 12/17/2009   • Clindamycin Nausea 02/02/2011   • Ees [erythromycin] Vomiting and Nausea 08/28/2010   • Flagyl [metronidazole hcl] Unspecified 03/31/2011   • Flomax [tamsulosin hydrochloride] Swelling 09/24/2009   • Metformin Unspecified 07/23/2013   • Sulfa drugs Hives and Rash 09/18/2010   • Tape Rash 08/15/2012   • Vancomycin Itching 07/10/2016   • Wound dressing adhesive Hives 01/12/2018   • Cephalexin [keflex] Rash 01/01/2017  "  • Erythromycin Rash 03/30/2017   • Levofloxacin Unspecified 10/27/2016   • Metronidazole Rash 03/30/2017   • Valproic acid Rash 03/30/2017        ROS: Denies Chest pain, SOB, LE edema.    /62   Pulse 93   Temp 36.7 °C (98.1 °F)   Resp 16   Ht 1.65 m (5' 4.96\")   Wt 122 kg (269 lb)   SpO2 91%   BMI 44.82 kg/m²     Physical Exam:  Gen:         Alert and oriented, No apparent distress.  Neck:        No Lymphadenopathy or Bruits.  Lungs:     Clear to auscultation bilaterally  CV:          Regular rate and rhythm. No murmurs, rubs or gallops.               Ext:          No clubbing, cyanosis, edema.      Assessment and Plan.   27 y.o. female with the following issues.    1. Cellulitis of left breast  Discussion about treatment options given her allergy profile do not feel comfortable switching to any other agent.  She has no other infectious symptoms will continue another 5 days of Bactrim.  She starts been having fevers or having exudative drainage she will presents the emergency room.  - sulfamethoxazole-trimethoprim (BACTRIM DS) 800-160 MG tablet; Take 1 Tab by mouth 2 times a day. FOR 5 DAYS  Dispense: 10 Tab; Refill: 0      "

## 2018-10-12 ENCOUNTER — PATIENT OUTREACH (OUTPATIENT)
Dept: HEALTH INFORMATION MANAGEMENT | Facility: OTHER | Age: 28
End: 2018-10-12

## 2018-10-13 ENCOUNTER — APPOINTMENT (OUTPATIENT)
Dept: RADIOLOGY | Facility: MEDICAL CENTER | Age: 28
End: 2018-10-13
Attending: EMERGENCY MEDICINE
Payer: MEDICARE

## 2018-10-13 ENCOUNTER — HOSPITAL ENCOUNTER (EMERGENCY)
Facility: MEDICAL CENTER | Age: 28
End: 2018-10-13
Attending: EMERGENCY MEDICINE
Payer: MEDICARE

## 2018-10-13 VITALS
BODY MASS INDEX: 45.03 KG/M2 | TEMPERATURE: 96.8 F | WEIGHT: 270.28 LBS | RESPIRATION RATE: 18 BRPM | HEART RATE: 70 BPM | HEIGHT: 65 IN | DIASTOLIC BLOOD PRESSURE: 66 MMHG | OXYGEN SATURATION: 97 % | SYSTOLIC BLOOD PRESSURE: 120 MMHG

## 2018-10-13 DIAGNOSIS — N61.0 CELLULITIS OF BREAST: Primary | ICD-10-CM

## 2018-10-13 DIAGNOSIS — G56.20 LESION OF ULNAR NERVE, UNSPECIFIED LATERALITY: ICD-10-CM

## 2018-10-13 LAB
ANION GAP SERPL CALC-SCNC: 11 MMOL/L (ref 0–11.9)
BASOPHILS # BLD AUTO: 0.6 % (ref 0–1.8)
BASOPHILS # BLD: 0.02 K/UL (ref 0–0.12)
BUN SERPL-MCNC: 9 MG/DL (ref 8–22)
CALCIUM SERPL-MCNC: 9.5 MG/DL (ref 8.5–10.5)
CHLORIDE SERPL-SCNC: 108 MMOL/L (ref 96–112)
CO2 SERPL-SCNC: 20 MMOL/L (ref 20–33)
CREAT SERPL-MCNC: 0.79 MG/DL (ref 0.5–1.4)
EOSINOPHIL # BLD AUTO: 0.06 K/UL (ref 0–0.51)
EOSINOPHIL NFR BLD: 1.9 % (ref 0–6.9)
ERYTHROCYTE [DISTWIDTH] IN BLOOD BY AUTOMATED COUNT: 42.5 FL (ref 35.9–50)
GLUCOSE BLD-MCNC: 105 MG/DL (ref 65–99)
GLUCOSE SERPL-MCNC: 101 MG/DL (ref 65–99)
HCT VFR BLD AUTO: 38.7 % (ref 37–47)
HGB BLD-MCNC: 13.5 G/DL (ref 12–16)
IMM GRANULOCYTES # BLD AUTO: 0.02 K/UL (ref 0–0.11)
IMM GRANULOCYTES NFR BLD AUTO: 0.6 % (ref 0–0.9)
LYMPHOCYTES # BLD AUTO: 1.11 K/UL (ref 1–4.8)
LYMPHOCYTES NFR BLD: 35.7 % (ref 22–41)
MCH RBC QN AUTO: 31.8 PG (ref 27–33)
MCHC RBC AUTO-ENTMCNC: 34.9 G/DL (ref 33.6–35)
MCV RBC AUTO: 91.1 FL (ref 81.4–97.8)
MONOCYTES # BLD AUTO: 0.31 K/UL (ref 0–0.85)
MONOCYTES NFR BLD AUTO: 10 % (ref 0–13.4)
NEUTROPHILS # BLD AUTO: 1.59 K/UL (ref 2–7.15)
NEUTROPHILS NFR BLD: 51.2 % (ref 44–72)
NRBC # BLD AUTO: 0 K/UL
NRBC BLD-RTO: 0 /100 WBC
PLATELET # BLD AUTO: 151 K/UL (ref 164–446)
PMV BLD AUTO: 11 FL (ref 9–12.9)
POTASSIUM SERPL-SCNC: 3.9 MMOL/L (ref 3.6–5.5)
RBC # BLD AUTO: 4.25 M/UL (ref 4.2–5.4)
SODIUM SERPL-SCNC: 139 MMOL/L (ref 135–145)
WBC # BLD AUTO: 3.1 K/UL (ref 4.8–10.8)

## 2018-10-13 PROCEDURE — 76604 US EXAM CHEST: CPT

## 2018-10-13 PROCEDURE — 82962 GLUCOSE BLOOD TEST: CPT

## 2018-10-13 PROCEDURE — 85025 COMPLETE CBC W/AUTO DIFF WBC: CPT

## 2018-10-13 PROCEDURE — 99284 EMERGENCY DEPT VISIT MOD MDM: CPT

## 2018-10-13 PROCEDURE — 80048 BASIC METABOLIC PNL TOTAL CA: CPT

## 2018-10-13 ASSESSMENT — PAIN SCALES - GENERAL
PAINLEVEL_OUTOF10: 8

## 2018-10-13 ASSESSMENT — LIFESTYLE VARIABLES: DO YOU DRINK ALCOHOL: NO

## 2018-10-13 NOTE — ED NOTES
Pt discharged to home.  IV removed and bandaged.  No noted swelling or redness.  Pt tolerated well.  Catheter intact.  Pt given discharge paperwork and all applicable prescriptions written by provider.  Pt to follow up with PCP if indicated in discharge instructions.  Pt verbalized understanding of discharge teaching and will return to ED if condition worsens.

## 2018-10-13 NOTE — DISCHARGE INSTRUCTIONS
Follow-up with primary care on Monday for reevaluation, medication management, close blood pressure monitoring and referral to breast clinic as needed for recurrent skin infection.  Follow-up with breast clinic as scheduled on discharge.    Continue Bactrim twice daily as previously prescribed.    Keep wounds clean, dry and intact.  Cleanse gently with warm water, soap, pat dry.  Avoid soaking in water.  Apply antibiotic ointment twice daily.    Return to the emergency department for persistent or worsening breast pain, swelling, redness, bleeding or other drainage, fever or other new concerns.

## 2018-10-13 NOTE — ED TRIAGE NOTES
Pt ambulated to room with assistance from RN, using four-wheel walker. Pain 8/10. Assessment complete. ERP notified of patient's arrival.

## 2018-10-13 NOTE — ED TRIAGE NOTES
"Kristin Patti Balderrama  28 y.o. female  Chief Complaint   Patient presents with   • Skin Lesion     Pt. has multiple lesions to her left breast for which she states she is taking Bactrim for the past two weeks but that the lesions have become worse and more infected. Some drainage noted with redness.     /67   Pulse 79   Temp 36 °C (96.8 °F)   Resp 16   Ht 1.651 m (5' 5\")   Wt 122.6 kg (270 lb 4.5 oz)   SpO2 96%   BMI 44.98 kg/m²     Pt amb to triage with steady gait for above complaint with the assistance of a walker.  Pt is alert and oriented, speaking in full sentences, follows commands and responds appropriately to questions. NAD. Resp are even and unlabored.  Pt placed in lobby. Pt educated on triage process. Pt encouraged to alert staff for any changes.  "

## 2018-10-13 NOTE — ED PROVIDER NOTES
"ED Provider Note    CHIEF COMPLAINT  Chief Complaint   Patient presents with   • Skin Lesion     Pt. has multiple lesions to her left breast for which she states she is taking Bactrim for the past two weeks but that the lesions have become worse and more infected. Some drainage noted with redness.       HPI  Kristin Balderrama is a 28 y.o. female who presents to the emergency department concern for left breast infection.  Patient was admitted in August for a breast cellulitis, received IV antibiotics with notable improvement.  Patient had some recurrence of these lesions a couple of weeks ago, she is was treated with a course of clindamycin although symptoms did not resolve entirely and therefore was started on Bactrim a couple of days ago.  Patient states she is concerned she may need IV antibiotics again.  Mild breast discomfort, worse with palpation of lesions.  Redness, scabbing without drainage.  Lesions are drying out.  No new lesions since previous evaluation.  Low-grade fever \"100\" at home.  No chest pain or shortness of breath.  No nausea or vomiting.    REVIEW OF SYSTEMS  See HPI for further details. All other systems are negative.     PAST MEDICAL HISTORY   has a past medical history of Abdominal pain; Anginal syndrome; Apnea, sleep; Arrhythmia; Arthritis; ASTHMA; Atrial fibrillation ([facility]); Back pain; Borderline personality disorder ([facility]); Breath shortness; Cardiac arrhythmia; Chickenpox; Chronic UTI (9/18/2010); Cough; Daytime sleepiness; Depression; Diabetes ([facility]); Diarrhea; Disorder of thyroid; Fall; Fatigue; Frequent headaches; Gasping for breath; Gynecological disorder; Headache(784.0); Heart burn; History of falling; Hypertension; Migraine; 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[facility]); 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[facility]); Nausea; Obesity; Pain (08-15-12); Painful joint; PCOS (polycystic ovarian syndrome); Pneumonia (2012); Psychosis ([facility]); Renal disorder; Ringing in ears; Scoliosis; Shortness of breath; Sinus " tachycardia (10/31/2013); Sleep apnea; Snoring; Tonsillitis; Tuberculosis; Urinary bladder disorder; Urinary incontinence; Weakness; and Wears glasses.    SOCIAL HISTORY  Social History     Social History Main Topics   • Smoking status: Never Smoker   • Smokeless tobacco: Never Used   • Alcohol use No   • Drug use: Yes     Frequency: 7.0 times per week     Types: Marijuana, Inhaled      Comment: Medicinal edible's   • Sexual activity: Not Currently     Birth control/ protection: Pill       SURGICAL HISTORY   has a past surgical history that includes neuro dest facet l/s w/ig sngl (4/21/2015); recovery (1/27/2016); [place] by laparoscopy (8/29/2010); lumbar fusion anterior (8/21/2012); other cardiac surgery (1/2016); tonsillectomy; bowel stimulator insertion (7/13/2016); gastroscopy with balloon dilatation (N/A, 1/4/2017); muscle biopsy (Right, 1/26/2017); other abdominal surgery; and laminotomy.    CURRENT MEDICATIONS  Home Medications     Reviewed by Jimmy Evans R.N. (Registered Nurse) on 10/13/18 at 0530  Med List Status: Partial   Medication Last Dose Status   aspirin EC (ECOTRIN) 81 MG Tablet Delayed Response Taking Active   baclofen (LIORESAL) 10 MG Tab Taking Active   busPIRone (BUSPAR) 10 MG Tab Taking Active   cholestyramine (QUESTRAN) 4 g packet Not Taking Active   cholestyramine (QUESTRAN,PREVALITE) 4 GM Pack Taking Active   cyanocobalamin ([employer] VITAMIN B-12) 500 MCG tablet Taking Active   fluoxetine (PROZAC) 10 MG Cap Taking Active   furosemide (LASIX) 40 MG Tab Taking Active   ivabradine (CORLANOR) 5 MG Tab tablet Taking Active   liraglutide (VICTOZA) 18 MG/3ML Solution Pen-injector injection Taking Active   melatonin 3 MG Tab Taking Active   nystatin (MYCOSTATIN) 949103 UNIT/GM Cream topical cream Taking Active   predniSONE (DELTASONE) 10 MG Tab Not Taking Active   predniSONE (DELTASONE) 20 MG Tab Not Taking Active   pregabalin (LYRICA) 100 MG Cap Taking Active   sodium bicarbonate (SODIUM  "BICARBONATE) 650 MG Tab Taking Active   sulfamethoxazole-trimethoprim (BACTRIM DS) 800-160 MG tablet  Active   traZODone (DESYREL) 100 MG Tab Taking Active   triamcinolone acetonide (KENALOG) 0.1 % Cream Taking Active   vitamin D (CHOLECALCIFEROL) 1000 UNIT Tab Taking Active   ziprasidone (GEODON) 80 MG Cap Taking Active                ALLERGIES  Allergies   Allergen Reactions   • Cefdinir Shortness of Breath and Itching     Tolerated 1/18/17  Tolerates ceftriaxone    • Depakote [Divalproex Sodium] Unspecified     Muscle spasms/muscle pain and weakness     • Doxycycline Anaphylaxis and Vomiting     RXN=unknown   • Abilify Unspecified     Headaches/muscle twitching     • Amitriptyline Unspecified     Headaches     • Amoxicillin Rash     Pt states \"I get a rash\".     • Ciprofloxacin Rash     Pt states \"I get a rash\".     • Clindamycin Nausea     Even with food     • Ees [Erythromycin] Vomiting and Nausea   • Flagyl [Metronidazole Hcl] Unspecified     \"eye problems\"     • Flomax [Tamsulosin Hydrochloride] Swelling   • Metformin Unspecified     Increased lactic acid      • Sulfa Drugs Hives and Rash     RXN=since childhood   • Tape Rash     Tears skin off   coban with Tegaderm tape ok  RXN=ongoing   • Vancomycin Itching     Pt becomes flushed in face and chest.   RXN=7/10/16   • Wound Dressing Adhesive Hives     By pt report   • Cephalexin [Keflex] Rash     Pt states she gets a rash with this medication  Tolerates ceftriaxone   • Erythromycin Rash     .   • Levofloxacin Unspecified     Leg muscle cramps   • Metronidazole Rash     .   • Valproic Acid Rash     .       PHYSICAL EXAM  VITAL SIGNS: /64   Pulse 72   Temp 36 °C (96.8 °F)   Resp 18   Ht 1.651 m (5' 5\")   Wt 122.6 kg (270 lb 4.5 oz)   SpO2 96%   BMI 44.98 kg/m²   Pulse ox interpretation: I interpret this pulse ox as normal.  Constitutional: Alert in no apparent distress.  Morbidly obese.  Disheveled.  HENT: Normocephalic, atraumatic. Bilateral " external ears normal, Nose normal. Moist mucous membranes.   Eyes: Pupils are equal and reactive, Conjunctiva normal.   Neck: Normal range of motion, Supple  Lymphatic: No lymphadenopathy noted.  No axillary or supraclavicular lymphadenopathy.  Cardiovascular: Normal peripheral perfusion.  Thorax & Lungs: Nonlabored respirations.  Skin: Warm, Dry.  Few dry, scabbed ulcerations on the superior left breast horizontally above the areola, 11 to 3 o'clock position.  Minimal induration underlying the largest ulceration, 2 x 3 cm at the 12 o'clock position above the area [place].  No fluctuance.  No drainage.  No bleeding.  No crepitus.  Musculoskeletal: Good range of motion in all major joints. .   Neurologic: Alert and oriented x4.  Ambulates independently.  Psychiatric: Odd affect otherwise cooperative    DIAGNOSTIC STUDIES / PROCEDURES    LABS  Results for orders placed or performed during the hospital encounter of 10/13/18   CBC WITH DIFFERENTIAL   Result Value Ref Range    WBC 3.1 (L) 4.8 - 10.8 K/uL    RBC 4.25 4.20 - 5.40 M/uL    Hemoglobin 13.5 12.0 - 16.0 g/dL    Hematocrit 38.7 37.0 - 47.0 %    MCV 91.1 81.4 - 97.8 fL    MCH 31.8 27.0 - 33.0 pg    MCHC 34.9 33.6 - 35.0 g/dL    RDW 42.5 35.9 - 50.0 fL    Platelet Count 151 (L) 164 - 446 K/uL    MPV 11.0 9.0 - 12.9 fL    Neutrophils-Polys 51.20 44.00 - 72.00 %    Lymphocytes 35.70 22.00 - 41.00 %    Monocytes 10.00 0.00 - 13.40 %    Eosinophils 1.90 0.00 - 6.90 %    Basophils 0.60 0.00 - 1.80 %    Immature Granulocytes 0.60 0.00 - 0.90 %    Nucleated RBC 0.00 /100 WBC    Neutrophils (Absolute) 1.59 (L) 2.00 - 7.15 K/uL    Lymphs (Absolute) 1.11 1.00 - 4.80 K/uL    Monos (Absolute) 0.31 0.00 - 0.85 K/uL    Eos (Absolute) 0.06 0.00 - 0.51 K/uL    Baso (Absolute) 0.02 0.00 - 0.12 K/uL    Immature Granulocytes (abs) 0.02 0.00 - 0.11 K/uL    NRBC (Absolute) 0.00 K/uL   BASIC METABOLIC PANEL   Result Value Ref Range    Sodium 139 135 - 145 mmol/L    Potassium 3.9 3.6 - 5.5  mmol/L    Chloride 108 96 - 112 mmol/L    Co2 20 20 - 33 mmol/L    Glucose 101 (H) 65 - 99 mg/dL    Bun 9 8 - 22 mg/dL    Creatinine 0.79 0.50 - 1.40 mg/dL    Calcium 9.5 8.5 - 10.5 mg/dL    Anion Gap 11.0 0.0 - 11.9   ESTIMATED GFR   Result Value Ref Range    GFR If African American >60 >60 mL/min/1.73 m 2    GFR If Non African American >60 >60 mL/min/1.73 m 2   ACCU-CHEK GLUCOSE   Result Value Ref Range    Glucose - Accu-Ck 105 (H) 65 - 99 mg/dL     *Note: Due to a large number of results and/or encounters for the requested time period, some results have not been displayed. A complete set of results can be found in Results Review.     RADIOLOGY  US-CHEST   Final Result      1.  No sonographic evidence of abscess in the area of interest in the left breast.      2.  No abnormal mass is appreciated.      3.  Minimal edema is noted just below the skin surface. Cellulitis is possible.          COURSE & MEDICAL DECISION MAKING  Nursing notes and vital signs were reviewed. (See chart for details)  The patients records were reviewed, history was obtained from the patient;     Medical record review: Multiple visits to primary care, most recently with Dr. Torres Brody 10/10/18 for evaluation and treatment of a recurrent left breast cellulitis.  Patient started on Bactrim.    ED evaluation of left breast pain and history of cellulitis, although not seen by myself previously, is quite well-appearing, evidence for healing with a dry scab lesions and minimal erythema.  Subtle persistent cellulitis.  Clinically quite consistent with a herpes zoster pattern, although duration has been greater than 2 weeks, therefore really no indication for steroids or antivirals at this time.  I do recommend continuation of the Bactrim for the previously thought cellulitis.  Labs are quite unremarkable, no leukocytosis, left shift or bandemia.  Blood glucose is quite well controlled.  Ultrasound without evidence for underlying breast abscess.   Vital signs are stable without fever tachycardia, patient was never hypotensive.  No clinical evidence for sepsis.  No indication for IV antibiotics or hospitalization.    Patient is stable for discharge at this time, anticipatory guidance provided, continue Bactrim as previously indicated, close follow-up is encouraged with primary care on Monday, appointment is also been made for patient at the breast clinic, and strict ED return instructions have been detailed. Patient is agreeable to the disposition and plan.    Patient's blood pressure was elevated in the emergency department, and has been referred to primary care for close monitoring.      FINAL IMPRESSION  (N61.0) Cellulitis of breast  (primary encounter diagnosis)      Electronically signed by: Junie Miranda, 10/13/2018 11:06 AM      This dictation was created using voice recognition software. The accuracy of the dictation is limited to the abilities of the software. I expect there may be some errors of grammar and possibly content. The nursing notes were reviewed and certain aspects of this information were incorporated into this note.

## 2018-10-14 ENCOUNTER — PATIENT OUTREACH (OUTPATIENT)
Dept: HEALTH INFORMATION MANAGEMENT | Facility: OTHER | Age: 28
End: 2018-10-14

## 2018-10-14 NOTE — PROGRESS NOTES
Placed discharge outreach phone call to pt s/p ER discharge 10/13/18.  Left voicemail providing my contact information and instructions to call with any questions or concerns.

## 2018-10-15 ENCOUNTER — PATIENT OUTREACH (OUTPATIENT)
Dept: HEALTH INFORMATION MANAGEMENT | Facility: OTHER | Age: 28
End: 2018-10-15

## 2018-10-16 ENCOUNTER — OFFICE VISIT (OUTPATIENT)
Dept: MEDICAL GROUP | Facility: MEDICAL CENTER | Age: 28
End: 2018-10-16
Payer: MEDICARE

## 2018-10-16 VITALS
WEIGHT: 270 LBS | TEMPERATURE: 97.4 F | HEIGHT: 65 IN | SYSTOLIC BLOOD PRESSURE: 112 MMHG | HEART RATE: 85 BPM | DIASTOLIC BLOOD PRESSURE: 82 MMHG | BODY MASS INDEX: 44.98 KG/M2 | OXYGEN SATURATION: 95 % | RESPIRATION RATE: 16 BRPM

## 2018-10-16 DIAGNOSIS — N64.59 BREAST SYMPTOM: ICD-10-CM

## 2018-10-16 DIAGNOSIS — N61.0 CELLULITIS OF LEFT BREAST: ICD-10-CM

## 2018-10-16 DIAGNOSIS — L03.90 RECURRENT CELLULITIS: ICD-10-CM

## 2018-10-16 PROCEDURE — 99213 OFFICE O/P EST LOW 20 MIN: CPT | Performed by: NURSE PRACTITIONER

## 2018-10-16 NOTE — PROGRESS NOTES
"CC: Referral Needed (left breast)        HPI:     Kristin is a [facility] patient, current problem is new to me, presents today for the followin. Breast symptom/Recurrent cellulitis/ Cellulitis of left breast  Here today to follow-up for recurrent left breast cellulitis.  In August she was inpatient with a PICC line receiving IV antibiotics.  Apparently she has had some recurrences was put on another dose or 2 of oral Bactrim which she tolerated well despite a possible history of a sulfa allergy.  She was evaluated in the emergency room a few days ago reassured and told to \"follow-up with the breast clinic\".  Here today for referral for a breast ultrasound to do that.  She did have a chest ultrasound while in the ER showing no abscess or mass.    These have been chronically intermittent for years.  Denies abscesses in the axillae.  States occasional she will get one in the groin.    She was last seen by infectious disease in August and given IM Rocephin in the office.    Topically at home she has been using currently on guard soap for the left breast and topical clindamycin ointment.  States these do not appear to be resolving the issue however equally does not appear to be worse as it had been in the past when she required antibiotics.  She just finished her most recent Bactrim course.  Historically she is tried chlorhexidine and Bactroban ointment no improvement with those either  States her grandmother is worried that it can get worse and she have to be in the hospital again so she is here to have it checked.        Current Outpatient Prescriptions   Medication Sig Dispense Refill   • pregabalin (LYRICA) 100 MG Cap Take 1 Cap by mouth 2 times a day for 90 days. 60 Cap 2   • cholestyramine (QUESTRAN,PREVALITE) 4 GM Pack Take 4 g by mouth every morning.     • melatonin 3 MG Tab Take 6 mg by mouth every bedtime.     • traZODone (DESYREL) 100 MG Tab Take 100 mg by mouth every bedtime.     • vitamin D " "(CHOLECALCIFEROL) 1000 UNIT Tab Take 1,000 Units by mouth every day.     • triamcinolone acetonide (KENALOG) 0.1 % Cream Apply 1 g to affected area(s) 2 times a day. To left breast  And LUE     • nystatin (MYCOSTATIN) 954784 UNIT/GM Cream topical cream Apply 0.0001 g to affected area(s) 2 times a day. 1 Tube 3   • furosemide (LASIX) 40 MG Tab Take 40 mg by mouth every day.     • cyanocobalamin ([employer] VITAMIN B-12) 500 MCG tablet Take 500 mcg by mouth every day.     • busPIRone (BUSPAR) 10 MG Tab Take 0.5 Tabs by mouth 2 times a day. 60 Tab 2   • baclofen (LIORESAL) 10 MG Tab Take 10 mg by mouth 2 times a day.     • liraglutide (VICTOZA) 18 MG/3ML Solution Pen-injector injection Inject 1.8 mg as instructed every day.     • ziprasidone (GEODON) 80 MG Cap Take 80 mg by mouth 2 Times a Day.     • cholestyramine (QUESTRAN) 4 g packet Take 1 Packet by mouth every day.     • ivabradine (CORLANOR) 5 MG Tab tablet Take 1 Tab by mouth 2 times a day, with meals. 60 Tab 11   • sodium bicarbonate (SODIUM BICARBONATE) 650 MG Tab Take 650 mg by mouth 2 times a day.     • fluoxetine (PROZAC) 10 MG Cap TAKE ONE CAPSULE BY MOUTH ONCE A DAY 30 Cap 11   • aspirin EC (ECOTRIN) 81 MG Tablet Delayed Response Take 1 Tab by mouth every day. 30 Tab 6     No current facility-administered medications for this visit.      Social History   Substance Use Topics   • Smoking status: Never Smoker   • Smokeless tobacco: Never Used   • Alcohol use No     I reviewed patients allergies, problem list and medications today in [facility].    ROS: Any/all pertinent positives listed in the HPI, otherwise all others reviewed are negative today.      /82   Pulse 85   Temp 36.3 °C (97.4 °F)   Resp 16   Ht 1.65 m (5' 4.96\")   Wt 122.5 kg (270 lb)   SpO2 95%   BMI 44.98 kg/m²     Exam:   Gen: Alert and oriented, No apparent distress. WDWN  Psych: A+Ox3, normal affect and mood  Skin: Warm, dry and intact. Good turgor   No rashes in visible areas.  Eye: " Conjunctiva clear, lids normal  ENMT: Lips without lesions, good dentition  Lungs: Unlabored respiratory effort.   Walking well with walker  Left breast has superficial erythema without any underlying indurations or abscesses.  There is no visible drainage.  There is no odor.  There does appear to be about 3 areas with some superficial peeling that largest are 2 cm in diameter.  There is some associated very local redness within this 2 cm.  It does not appear to be extending.  It is not warm to the touch.  No gross deformity with the breast    Assessment and Plan.   28 y.o. female with the following issues.    1. Breast symptom/Recurrent cellulitis/Cellulitis of left breast  Stable and I reassured the patient at this point in time I do not feel that she is a candidate for IM or oral antibiotics.  She can continue her home care regimen.  We did discuss if she has any worsening sore continues not to fully resolve that she may benefit from seeing infectious disease to follow-up for this.  Ultrasound ordered to however I feel this is unlikely to contribute to plan of care.    She may be a good candidate for trying something like Spironolactone if there is a hormonal component.  - US-BREAST COMPLETE-LEFT; Future  - REFERRAL TO INFECTIOUS DISEASE    .Over 50% of this 15minute visit was spent face to face on counseling and coordination of care regarding the above.

## 2018-10-18 NOTE — PROGRESS NOTES
Patient Kristin Balderrama was admitted to [facility] on 9/7/18 for acute right eye pain.  The patient was discharged on 9/13/18 and instructed to follow up with her PCP and Neuro Ophthalmology.  The patient successfully filled her discharge medications.  The patient followed up with her PCP on 9/18/18 and 10/10/18.  The patient was not scheduled to follow up with Neuro Ophthalmology and so this patient advocate got the patient scheduled to follow up on 10/9/18 and this appointment was kept.  The patient also followed up with Cardiology on 10/1/18.  The patient was scheduled for a sleep study and follow up with Pulmonology, however, the patient cancelled these appointment and has not rescheduled at this time.  The patient is scheduled for 4 future appointments with her PCP on 10/15/18, Breast Ultrasound on 11/27/18, Neuro Ophthalmology on 2/11/19 and Cardiology on 4/10/19.

## 2018-10-19 NOTE — PROGRESS NOTES
Monitor summary:  -ST , 16/08/36, [id number]   Refill created in a separate encounter. Please use this encounter for the patient forms that were brought in to clinic. Thank you

## 2018-10-24 ENCOUNTER — PATIENT OUTREACH (OUTPATIENT)
Dept: HEALTH INFORMATION MANAGEMENT | Facility: OTHER | Age: 28
End: 2018-10-24

## 2018-10-24 NOTE — LETTER
October 24, 2018        Kristin Balderrama  [address] [place] [address]        Dear Kristin:    My name is Marivel and I am a Nurse Care Coordinator with the [facility] Care Coordination team, Population Health.       I have been trying to reach you by telephone to offer enrollment in a program provided by [facility] that is a benefit of your insurance, which could be of help to patients who have chronic conditions.    If you would like additional information regarding these programs that may assist you with achieving your health care goals, managing your health care challenges or connecting with community resources, please contact Marivel  at [phone].          Sincerely,    Marivel Zarate RN Care Coordinator          Marivel Zarate R.N.    Electronically Signed

## 2018-10-24 NOTE — PROGRESS NOTES
VM's and letter have been sent to the patient with no response. Pt to reach out to CC RN if interested in CC program. Contact information has been provided to pt via VM and letter. Plan: CCRN to remove self from care team and resolve episode of care.

## 2018-10-25 ENCOUNTER — HOSPITAL ENCOUNTER (OUTPATIENT)
Facility: MEDICAL CENTER | Age: 28
End: 2018-10-26
Attending: EMERGENCY MEDICINE | Admitting: INTERNAL MEDICINE
Payer: MEDICARE

## 2018-10-25 ENCOUNTER — APPOINTMENT (OUTPATIENT)
Dept: RADIOLOGY | Facility: MEDICAL CENTER | Age: 28
End: 2018-10-25
Attending: EMERGENCY MEDICINE
Payer: MEDICARE

## 2018-10-25 DIAGNOSIS — L03.90 CELLULITIS, UNSPECIFIED CELLULITIS SITE: ICD-10-CM

## 2018-10-25 LAB
ALBUMIN SERPL BCP-MCNC: 4.8 G/DL (ref 3.2–4.9)
ALBUMIN/GLOB SERPL: 1.9 G/DL
ALP SERPL-CCNC: 60 U/L (ref 30–99)
ALT SERPL-CCNC: 74 U/L (ref 2–50)
ANION GAP SERPL CALC-SCNC: 11 MMOL/L (ref 0–11.9)
APPEARANCE UR: ABNORMAL
APTT PPP: 28.2 SEC (ref 24.7–36)
AST SERPL-CCNC: 35 U/L (ref 12–45)
BACTERIA #/AREA URNS HPF: NEGATIVE /HPF
BASOPHILS # BLD AUTO: 1.1 % (ref 0–1.8)
BASOPHILS # BLD: 0.06 K/UL (ref 0–0.12)
BILIRUB SERPL-MCNC: 0.7 MG/DL (ref 0.1–1.5)
BILIRUB UR QL STRIP.AUTO: NEGATIVE
BUN SERPL-MCNC: 7 MG/DL (ref 8–22)
CALCIUM SERPL-MCNC: 10.1 MG/DL (ref 8.5–10.5)
CHLORIDE SERPL-SCNC: 105 MMOL/L (ref 96–112)
CO2 SERPL-SCNC: 20 MMOL/L (ref 20–33)
COLOR UR: YELLOW
CREAT SERPL-MCNC: 0.78 MG/DL (ref 0.5–1.4)
CRP SERPL HS-MCNC: 0.64 MG/DL (ref 0–0.75)
EOSINOPHIL # BLD AUTO: 0.08 K/UL (ref 0–0.51)
EOSINOPHIL NFR BLD: 1.4 % (ref 0–6.9)
EPI CELLS #/AREA URNS HPF: ABNORMAL /HPF
ERYTHROCYTE [DISTWIDTH] IN BLOOD BY AUTOMATED COUNT: 41.5 FL (ref 35.9–50)
ERYTHROCYTE [SEDIMENTATION RATE] IN BLOOD BY WESTERGREN METHOD: 15 MM/HOUR (ref 0–20)
FERRITIN SERPL-MCNC: 124.6 NG/ML (ref 10–291)
GLOBULIN SER CALC-MCNC: 2.5 G/DL (ref 1.9–3.5)
GLUCOSE BLD-MCNC: 78 MG/DL (ref 65–99)
GLUCOSE SERPL-MCNC: 104 MG/DL (ref 65–99)
GLUCOSE UR STRIP.AUTO-MCNC: NEGATIVE MG/DL
HCT VFR BLD AUTO: 41.5 % (ref 37–47)
HGB BLD-MCNC: 14.3 G/DL (ref 12–16)
HYALINE CASTS #/AREA URNS LPF: ABNORMAL /LPF
IMM GRANULOCYTES # BLD AUTO: 0.03 K/UL (ref 0–0.11)
IMM GRANULOCYTES NFR BLD AUTO: 0.5 % (ref 0–0.9)
INR PPP: 0.99 (ref 0.87–1.13)
KETONES UR STRIP.AUTO-MCNC: NEGATIVE MG/DL
LACTATE BLD-SCNC: 1.6 MMOL/L (ref 0.5–2)
LACTATE BLD-SCNC: 2.2 MMOL/L (ref 0.5–2)
LDH SERPL L TO P-CCNC: 209 U/L (ref 107–266)
LEUKOCYTE ESTERASE UR QL STRIP.AUTO: ABNORMAL
LYMPHOCYTES # BLD AUTO: 1.82 K/UL (ref 1–4.8)
LYMPHOCYTES NFR BLD: 32.7 % (ref 22–41)
MCH RBC QN AUTO: 30.8 PG (ref 27–33)
MCHC RBC AUTO-ENTMCNC: 34.5 G/DL (ref 33.6–35)
MCV RBC AUTO: 89.4 FL (ref 81.4–97.8)
MICRO URNS: ABNORMAL
MONOCYTES # BLD AUTO: 0.38 K/UL (ref 0–0.85)
MONOCYTES NFR BLD AUTO: 6.8 % (ref 0–13.4)
NEUTROPHILS # BLD AUTO: 3.19 K/UL (ref 2–7.15)
NEUTROPHILS NFR BLD: 57.5 % (ref 44–72)
NITRITE UR QL STRIP.AUTO: NEGATIVE
NRBC # BLD AUTO: 0 K/UL
NRBC BLD-RTO: 0 /100 WBC
PH UR STRIP.AUTO: 5.5 [PH]
PLATELET # BLD AUTO: 211 K/UL (ref 164–446)
PMV BLD AUTO: 11 FL (ref 9–12.9)
POTASSIUM SERPL-SCNC: 4.1 MMOL/L (ref 3.6–5.5)
PROT SERPL-MCNC: 7.3 G/DL (ref 6–8.2)
PROT UR QL STRIP: NEGATIVE MG/DL
PROTHROMBIN TIME: 13.2 SEC (ref 12–14.6)
RBC # BLD AUTO: 4.64 M/UL (ref 4.2–5.4)
RBC # URNS HPF: ABNORMAL /HPF
RBC UR QL AUTO: NEGATIVE
SODIUM SERPL-SCNC: 136 MMOL/L (ref 135–145)
SP GR UR STRIP.AUTO: 1.02
URATE CRY #/AREA URNS HPF: POSITIVE /HPF
UROBILINOGEN UR STRIP.AUTO-MCNC: 0.2 MG/DL
WBC # BLD AUTO: 5.6 K/UL (ref 4.8–10.8)
WBC #/AREA URNS HPF: ABNORMAL /HPF

## 2018-10-25 PROCEDURE — 96376 TX/PRO/DX INJ SAME DRUG ADON: CPT

## 2018-10-25 PROCEDURE — 700101 HCHG RX REV CODE 250: Performed by: EMERGENCY MEDICINE

## 2018-10-25 PROCEDURE — 700111 HCHG RX REV CODE 636 W/ 250 OVERRIDE (IP): Performed by: STUDENT IN AN ORGANIZED HEALTH CARE EDUCATION/TRAINING PROGRAM

## 2018-10-25 PROCEDURE — 700101 HCHG RX REV CODE 250: Performed by: INTERNAL MEDICINE

## 2018-10-25 PROCEDURE — 700102 HCHG RX REV CODE 250 W/ 637 OVERRIDE(OP): Mod: JG | Performed by: STUDENT IN AN ORGANIZED HEALTH CARE EDUCATION/TRAINING PROGRAM

## 2018-10-25 PROCEDURE — 83605 ASSAY OF LACTIC ACID: CPT

## 2018-10-25 PROCEDURE — 71045 X-RAY EXAM CHEST 1 VIEW: CPT

## 2018-10-25 PROCEDURE — 36415 COLL VENOUS BLD VENIPUNCTURE: CPT

## 2018-10-25 PROCEDURE — 87040 BLOOD CULTURE FOR BACTERIA: CPT

## 2018-10-25 PROCEDURE — G0378 HOSPITAL OBSERVATION PER HR: HCPCS

## 2018-10-25 PROCEDURE — 99220 PR INITIAL OBSERVATION CARE,LEVL III: CPT | Mod: GC | Performed by: INTERNAL MEDICINE

## 2018-10-25 PROCEDURE — 700111 HCHG RX REV CODE 636 W/ 250 OVERRIDE (IP): Performed by: EMERGENCY MEDICINE

## 2018-10-25 PROCEDURE — 96374 THER/PROPH/DIAG INJ IV PUSH: CPT

## 2018-10-25 PROCEDURE — 85730 THROMBOPLASTIN TIME PARTIAL: CPT

## 2018-10-25 PROCEDURE — 76604 US EXAM CHEST: CPT

## 2018-10-25 PROCEDURE — 99285 EMERGENCY DEPT VISIT HI MDM: CPT

## 2018-10-25 PROCEDURE — A9270 NON-COVERED ITEM OR SERVICE: HCPCS | Performed by: STUDENT IN AN ORGANIZED HEALTH CARE EDUCATION/TRAINING PROGRAM

## 2018-10-25 PROCEDURE — 82962 GLUCOSE BLOOD TEST: CPT

## 2018-10-25 PROCEDURE — 82728 ASSAY OF FERRITIN: CPT

## 2018-10-25 PROCEDURE — 85610 PROTHROMBIN TIME: CPT

## 2018-10-25 PROCEDURE — 700105 HCHG RX REV CODE 258: Performed by: EMERGENCY MEDICINE

## 2018-10-25 PROCEDURE — 87086 URINE CULTURE/COLONY COUNT: CPT

## 2018-10-25 PROCEDURE — 86140 C-REACTIVE PROTEIN: CPT

## 2018-10-25 PROCEDURE — 83615 LACTATE (LD) (LDH) ENZYME: CPT

## 2018-10-25 PROCEDURE — 96375 TX/PRO/DX INJ NEW DRUG ADDON: CPT | Mod: XU

## 2018-10-25 PROCEDURE — 81001 URINALYSIS AUTO W/SCOPE: CPT

## 2018-10-25 PROCEDURE — 85025 COMPLETE CBC W/AUTO DIFF WBC: CPT

## 2018-10-25 PROCEDURE — 87502 INFLUENZA DNA AMP PROBE: CPT

## 2018-10-25 PROCEDURE — 700105 HCHG RX REV CODE 258: Performed by: STUDENT IN AN ORGANIZED HEALTH CARE EDUCATION/TRAINING PROGRAM

## 2018-10-25 PROCEDURE — 700102 HCHG RX REV CODE 250 W/ 637 OVERRIDE(OP): Performed by: STUDENT IN AN ORGANIZED HEALTH CARE EDUCATION/TRAINING PROGRAM

## 2018-10-25 PROCEDURE — 87077 CULTURE AEROBIC IDENTIFY: CPT

## 2018-10-25 PROCEDURE — 85652 RBC SED RATE AUTOMATED: CPT

## 2018-10-25 PROCEDURE — 87186 SC STD MICRODIL/AGAR DIL: CPT

## 2018-10-25 PROCEDURE — 96365 THER/PROPH/DIAG IV INF INIT: CPT | Mod: XU

## 2018-10-25 PROCEDURE — 36569 INSJ PICC 5 YR+ W/O IMAGING: CPT

## 2018-10-25 PROCEDURE — 80053 COMPREHEN METABOLIC PANEL: CPT

## 2018-10-25 PROCEDURE — 96368 THER/DIAG CONCURRENT INF: CPT | Mod: XU

## 2018-10-25 RX ORDER — SODIUM CHLORIDE 9 MG/ML
2000 INJECTION, SOLUTION INTRAVENOUS ONCE
Status: COMPLETED | OUTPATIENT
Start: 2018-10-25 | End: 2018-10-25

## 2018-10-25 RX ORDER — SODIUM CHLORIDE 9 MG/ML
30 INJECTION, SOLUTION INTRAVENOUS
Status: DISCONTINUED | OUTPATIENT
Start: 2018-10-25 | End: 2018-10-26 | Stop reason: HOSPADM

## 2018-10-25 RX ORDER — BISACODYL 10 MG
10 SUPPOSITORY, RECTAL RECTAL
Status: DISCONTINUED | OUTPATIENT
Start: 2018-10-25 | End: 2018-10-26 | Stop reason: HOSPADM

## 2018-10-25 RX ORDER — ONDANSETRON 4 MG/1
4 TABLET, ORALLY DISINTEGRATING ORAL EVERY 4 HOURS PRN
Status: DISCONTINUED | OUTPATIENT
Start: 2018-10-25 | End: 2018-10-26 | Stop reason: HOSPADM

## 2018-10-25 RX ORDER — GUAIFENESIN/DEXTROMETHORPHAN 100-10MG/5
10 SYRUP ORAL EVERY 6 HOURS PRN
Status: DISCONTINUED | OUTPATIENT
Start: 2018-10-25 | End: 2018-10-26 | Stop reason: HOSPADM

## 2018-10-25 RX ORDER — PROMETHAZINE HYDROCHLORIDE 12.5 MG/1
12.5-25 SUPPOSITORY RECTAL EVERY 4 HOURS PRN
Status: DISCONTINUED | OUTPATIENT
Start: 2018-10-25 | End: 2018-10-26 | Stop reason: HOSPADM

## 2018-10-25 RX ORDER — TRAZODONE HYDROCHLORIDE 50 MG/1
100 TABLET ORAL
Status: DISCONTINUED | OUTPATIENT
Start: 2018-10-25 | End: 2018-10-25

## 2018-10-25 RX ORDER — CLINDAMYCIN PHOSPHATE 600 MG/50ML
600 INJECTION, SOLUTION INTRAVENOUS EVERY 8 HOURS
Status: COMPLETED | OUTPATIENT
Start: 2018-10-25 | End: 2018-10-25

## 2018-10-25 RX ORDER — PROMETHAZINE HYDROCHLORIDE 25 MG/1
12.5-25 TABLET ORAL EVERY 4 HOURS PRN
Status: DISCONTINUED | OUTPATIENT
Start: 2018-10-25 | End: 2018-10-26 | Stop reason: HOSPADM

## 2018-10-25 RX ORDER — LANOLIN ALCOHOL/MO/W.PET/CERES
6 CREAM (GRAM) TOPICAL
Status: DISCONTINUED | OUTPATIENT
Start: 2018-10-25 | End: 2018-10-25

## 2018-10-25 RX ORDER — CLINDAMYCIN PHOSPHATE 600 MG/50ML
600 INJECTION, SOLUTION INTRAVENOUS EVERY 8 HOURS
Status: DISCONTINUED | OUTPATIENT
Start: 2018-10-25 | End: 2018-10-25

## 2018-10-25 RX ORDER — CHOLESTYRAMINE LIGHT 4 G/5.7G
4 POWDER, FOR SUSPENSION ORAL DAILY
Status: DISCONTINUED | OUTPATIENT
Start: 2018-10-26 | End: 2018-10-26 | Stop reason: HOSPADM

## 2018-10-25 RX ORDER — CHOLECALCIFEROL (VITAMIN D3) 125 MCG
500 CAPSULE ORAL DAILY
Status: DISCONTINUED | OUTPATIENT
Start: 2018-10-25 | End: 2018-10-26 | Stop reason: HOSPADM

## 2018-10-25 RX ORDER — CLINDAMYCIN PHOSPHATE 600 MG/50ML
600 INJECTION, SOLUTION INTRAVENOUS ONCE
Status: COMPLETED | OUTPATIENT
Start: 2018-10-25 | End: 2018-10-25

## 2018-10-25 RX ORDER — BUSPIRONE HYDROCHLORIDE 10 MG/1
5 TABLET ORAL 2 TIMES DAILY
Status: DISCONTINUED | OUTPATIENT
Start: 2018-10-25 | End: 2018-10-26 | Stop reason: HOSPADM

## 2018-10-25 RX ORDER — ACETAMINOPHEN 325 MG/1
650 TABLET ORAL EVERY 6 HOURS PRN
Status: DISCONTINUED | OUTPATIENT
Start: 2018-10-25 | End: 2018-10-26 | Stop reason: HOSPADM

## 2018-10-25 RX ORDER — FLUOXETINE 10 MG/1
10 CAPSULE ORAL DAILY
Status: DISCONTINUED | OUTPATIENT
Start: 2018-10-26 | End: 2018-10-26 | Stop reason: HOSPADM

## 2018-10-25 RX ORDER — DEXTROSE MONOHYDRATE 25 G/50ML
25 INJECTION, SOLUTION INTRAVENOUS
Status: DISCONTINUED | OUTPATIENT
Start: 2018-10-25 | End: 2018-10-26 | Stop reason: HOSPADM

## 2018-10-25 RX ORDER — SODIUM CHLORIDE 9 MG/ML
INJECTION, SOLUTION INTRAVENOUS CONTINUOUS
Status: DISCONTINUED | OUTPATIENT
Start: 2018-10-25 | End: 2018-10-26 | Stop reason: HOSPADM

## 2018-10-25 RX ORDER — ZIPRASIDONE HYDROCHLORIDE 40 MG/1
80 CAPSULE ORAL 2 TIMES DAILY
Status: DISCONTINUED | OUTPATIENT
Start: 2018-10-25 | End: 2018-10-26 | Stop reason: HOSPADM

## 2018-10-25 RX ORDER — AMOXICILLIN 250 MG
2 CAPSULE ORAL 2 TIMES DAILY
Status: DISCONTINUED | OUTPATIENT
Start: 2018-10-25 | End: 2018-10-26 | Stop reason: HOSPADM

## 2018-10-25 RX ORDER — POLYETHYLENE GLYCOL 3350 17 G/17G
1 POWDER, FOR SOLUTION ORAL
Status: DISCONTINUED | OUTPATIENT
Start: 2018-10-25 | End: 2018-10-26 | Stop reason: HOSPADM

## 2018-10-25 RX ORDER — ONDANSETRON 2 MG/ML
4 INJECTION INTRAMUSCULAR; INTRAVENOUS EVERY 4 HOURS PRN
Status: DISCONTINUED | OUTPATIENT
Start: 2018-10-25 | End: 2018-10-26 | Stop reason: HOSPADM

## 2018-10-25 RX ORDER — ACETAMINOPHEN 325 MG/1
650 TABLET ORAL EVERY 6 HOURS PRN
Status: DISCONTINUED | OUTPATIENT
Start: 2018-10-25 | End: 2018-10-25

## 2018-10-25 RX ORDER — CLINDAMYCIN HYDROCHLORIDE 150 MG/1
450 CAPSULE ORAL 4 TIMES DAILY
Status: DISCONTINUED | OUTPATIENT
Start: 2018-10-26 | End: 2018-10-26 | Stop reason: HOSPADM

## 2018-10-25 RX ORDER — SODIUM BICARBONATE 650 MG/1
650 TABLET ORAL 2 TIMES DAILY
Status: DISCONTINUED | OUTPATIENT
Start: 2018-10-25 | End: 2018-10-26 | Stop reason: HOSPADM

## 2018-10-25 RX ORDER — FUROSEMIDE 40 MG/1
40 TABLET ORAL DAILY
Status: DISCONTINUED | OUTPATIENT
Start: 2018-10-26 | End: 2018-10-26 | Stop reason: HOSPADM

## 2018-10-25 RX ORDER — BACLOFEN 10 MG/1
10 TABLET ORAL 2 TIMES DAILY
Status: DISCONTINUED | OUTPATIENT
Start: 2018-10-25 | End: 2018-10-26 | Stop reason: HOSPADM

## 2018-10-25 RX ORDER — PREGABALIN 100 MG/1
100 CAPSULE ORAL 2 TIMES DAILY
Status: DISCONTINUED | OUTPATIENT
Start: 2018-10-25 | End: 2018-10-26 | Stop reason: HOSPADM

## 2018-10-25 RX ADMIN — SODIUM CHLORIDE 2000 ML: 9 INJECTION, SOLUTION INTRAVENOUS at 15:49

## 2018-10-25 RX ADMIN — BUSPIRONE HYDROCHLORIDE 5 MG: 10 TABLET ORAL at 22:01

## 2018-10-25 RX ADMIN — SODIUM CHLORIDE: 9 INJECTION, SOLUTION INTRAVENOUS at 16:35

## 2018-10-25 RX ADMIN — CLINDAMYCIN IN 5 PERCENT DEXTROSE 600 MG: 12 INJECTION, SOLUTION INTRAVENOUS at 15:48

## 2018-10-25 RX ADMIN — BACLOFEN 10 MG: 10 TABLET ORAL at 17:00

## 2018-10-25 RX ADMIN — CLINDAMYCIN IN 5 PERCENT DEXTROSE 600 MG: 12 INJECTION, SOLUTION INTRAVENOUS at 21:30

## 2018-10-25 RX ADMIN — ONDANSETRON 4 MG: 2 INJECTION INTRAMUSCULAR; INTRAVENOUS at 16:03

## 2018-10-25 RX ADMIN — SODIUM BICARBONATE 650 MG: 650 TABLET ORAL at 17:00

## 2018-10-25 RX ADMIN — ONDANSETRON 4 MG: 2 INJECTION INTRAMUSCULAR; INTRAVENOUS at 22:12

## 2018-10-25 RX ADMIN — CYANOCOBALAMIN TAB 500 MCG 500 MCG: 500 TAB at 17:00

## 2018-10-25 RX ADMIN — CEFTRIAXONE SODIUM 2 G: 2 INJECTION, POWDER, FOR SOLUTION INTRAMUSCULAR; INTRAVENOUS at 15:48

## 2018-10-25 RX ADMIN — PREGABALIN 100 MG: 100 CAPSULE ORAL at 17:00

## 2018-10-25 RX ADMIN — ZIPRASIDONE HCL 80 MG: 40 CAPSULE ORAL at 22:04

## 2018-10-25 RX ADMIN — ASPIRIN 81 MG: 81 TABLET, COATED ORAL at 17:00

## 2018-10-25 ASSESSMENT — ENCOUNTER SYMPTOMS
COUGH: 0
SORE THROAT: 0
SHORTNESS OF BREATH: 0
NAUSEA: 1
WEIGHT LOSS: 1
MYALGIAS: 1
EYE PAIN: 1
BLOOD IN STOOL: 0
BRUISES/BLEEDS EASILY: 0
EYE DISCHARGE: 1
NERVOUS/ANXIOUS: 0
DIARRHEA: 0
DOUBLE VISION: 0
DIZZINESS: 1
FEVER: 1
DEPRESSION: 0
BACK PAIN: 0
WEAKNESS: 1
HEARTBURN: 0
POLYDIPSIA: 0
BLURRED VISION: 1
SINUS PAIN: 0
NECK PAIN: 0
HEADACHES: 0
EYE PAIN: 0
PALPITATIONS: 0
BLURRED VISION: 0
WHEEZING: 0
CHILLS: 1
ABDOMINAL PAIN: 0
CONSTIPATION: 0
PALPITATIONS: 1
VOMITING: 1

## 2018-10-25 ASSESSMENT — COGNITIVE AND FUNCTIONAL STATUS - GENERAL
MOBILITY SCORE: 20
SUGGESTED CMS G CODE MODIFIER DAILY ACTIVITY: CH
MOVING FROM LYING ON BACK TO SITTING ON SIDE OF FLAT BED: A LITTLE
DAILY ACTIVITIY SCORE: 24
SUGGESTED CMS G CODE MODIFIER MOBILITY: CJ
WALKING IN HOSPITAL ROOM: A LITTLE
TURNING FROM BACK TO SIDE WHILE IN FLAT BAD: A LITTLE
CLIMB 3 TO 5 STEPS WITH RAILING: A LITTLE

## 2018-10-25 ASSESSMENT — PATIENT HEALTH QUESTIONNAIRE - PHQ9
SUM OF ALL RESPONSES TO PHQ9 QUESTIONS 1 AND 2: 0
1. LITTLE INTEREST OR PLEASURE IN DOING THINGS: NOT AT ALL
2. FEELING DOWN, DEPRESSED, IRRITABLE, OR HOPELESS: NOT AT ALL

## 2018-10-25 ASSESSMENT — PAIN SCALES - GENERAL
PAINLEVEL_OUTOF10: 9
PAINLEVEL_OUTOF10: ASSUMED PAIN PRESENT

## 2018-10-25 ASSESSMENT — LIFESTYLE VARIABLES
EVER_SMOKED: NEVER
DO YOU DRINK ALCOHOL: NO

## 2018-10-25 NOTE — NON-PROVIDER
"      Internal Medicine Admitting History and Physical    Note Author: Ju Brownlee, Student       Name Kristin Balderrama     1990   Age/Sex 28 y.o. female   MRN [id number]   Code Status Full     After 5PM or if no immediate response to page, please call for cross-coverage  Attending/Team: Lyle Villagomez/Sam See Patient List for primary contact information  Call [phone] to page    1st Call - Day Intern (R1):   Bob 2nd Call - Day Sr. Resident (R2/R3):   Darnell       Chief Complaint:  Worsening left breast cellulitis.    HPI:  29 YO female who presented to the ED complaining of worsening left breast cellulitis. She has been on oral bactrim and topical clindamycin for the last 3 weeks. The wounds began with spider bites that \"healed\" and opened back up. She reports fever, chills, nausea and vomiting for the last four days. The vomit is described as bile and is yellow in color. She also reports extreme fatigue where she sleeps for 20 hours a day. She reports that she has had breast wounds and pain for the last ten years.  Patient was originally admitted to the hospital on 18 for left breast cellulitis. She was treated with a PICC line ceftriaxone. On  she had a follow up with infectious disease (Dr. Rios) who reported her cellulitis was healing. She was given one more week of C3 after this visit.     Patient was seen in the ED on 10/13 for the same complaint. At that time there was no evidence of cellulitis. The patient was discharged and told to continue the oral bactrim.    .Review of Systems   Constitutional: Positive for chills, fever, malaise/fatigue and weight loss.   HENT: Negative for ear pain, hearing loss and sore throat.    Eyes: Positive for blurred vision, pain and discharge.   Respiratory: Negative for cough, shortness of breath and wheezing.    Cardiovascular: Positive for palpitations. Negative for chest pain.   Gastrointestinal: Positive for nausea and vomiting. Negative " "for abdominal pain, constipation, diarrhea and heartburn.   Genitourinary: Negative for dysuria, frequency and urgency.   Musculoskeletal: Negative for back pain and neck pain.   Skin: Negative for itching and rash.   Neurological: Positive for dizziness and weakness. Negative for headaches.   Psychiatric/Behavioral: Negative for depression.             Past Medical History:   Past Medical History:   Diagnosis Date   • Abdominal pain    • Anginal syndrome     random chest pain especially with tachycardia   • Apnea, sleep    • Arrhythmia     \"sinus tachycardia\", cariologist, Dr. Kumar; ablation 2/2016   • Arthritis     osteo   • ASTHMA     when around smoke   • Atrial fibrillation (HCC)    • Back pain    • Borderline personality disorder (HCC)    • Breath shortness     with tachycardia   • Cardiac arrhythmia    • Chickenpox    • Chronic UTI 9/18/2010   • Cough    • Daytime sleepiness    • Depression    • Diabetes (HCC)    • Diarrhea    • Disorder of thyroid    • Fall    • Fatigue    • Frequent headaches    • Gasping for breath    • Gynecological disorder     PCOS   • Headache(784.0)    • Heart burn    • History of falling    • Hypertension    • Migraine    • 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HCC)    • 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HCC)    • Nausea    • Obesity    • Pain 08-15-12    back, 7/10   • Painful joint    • PCOS (polycystic ovarian syndrome)    • Pneumonia 2012   • Psychosis (HCC)    • Renal disorder     \"kidney disease, stage 1\" nephrologist, Dr. 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   • Urinary bladder disorder     Suprapubic cath   • Urinary incontinence    • Weakness    • Wears glasses        Past Surgical History:  Past Surgical History:   Procedure Laterality Date   • MUSCLE BIOPSY Right " 1/26/2017    Procedure: MUSCLE BIOPSY - THIGH;  Surgeon: Isidro Vigil M.D.;  Location: SURGERY [facility];  Service:    • GASTROSCOPY WITH BALLOON DILATATION N/A 1/4/2017    Procedure: GASTROSCOPY WITH DILATATION;  Surgeon: Torres Vargas M.D.;  Location: SURGERY [facility];  Service:    • BOWEL STIMULATOR INSERTION  7/13/2016    Procedure: BOWEL STIMULATOR INSERTION FOR PERMANENT INTERSTIM SACRAL IMPLANT;  Surgeon: Joe Noyola M.D.;  Location: SURGERY [facility];  Service:    • RECOVERY  1/27/2016    Procedure: CATH LAB EP STUDY WITH SINUS NODE MODIFICATION LA;  Surgeon: [facility];  Location[place] SUR[place] [facility] [facility];  Service:    • OTHER CARDIAC SURGERY  1/2016    cardiac ablation   • NEURO DEST FACET L/S W/IG SNGL  4/21/2015    Performed by Reza Tabor at [facility]   • LUMBAR FUSION ANTERIOR  8/21/2012    Performed by SUSIE SAWANT at SURGERY [facility] ORS   • ALYSSA BY LAPAROSCOPY  8/29/2010    Performed by SHAYY JOHNS at SURGERY [facility]   • LAMINOTOMY     • OTHER ABDOMINAL SURGERY     • TONSILLECTOMY      tonsillectomy       Current Outpatient Medications:  Home Medications     Reviewed by Lorenzo Abdul R.N. (Registered Nurse) on 10/25/18 at [facility]  Med List Status: <None>   Medication Last Dose Status   aspirin EC (ECOTRIN) 81 MG Tablet Delayed Response Taking Active   baclofen (LIORESAL) 10 MG Tab Taking Active   busPIRone (BUSPAR) 10 MG Tab Taking Active   cholestyramine (QUESTRAN) 4 g packet Not Taking Active   cholestyramine (QUESTRAN,PREVALITE) 4 GM Pack Taking Active   cyanocobalamin ([employer] VITAMIN B-12) 500 MCG tablet Taking Active   fluoxetine (PROZAC) 10 MG Cap Taking Active   furosemide (LASIX) 40 MG Tab Taking Active   ivabradine (CORLANOR) 5 MG Tab tablet Taking Active   liraglutide (VICTOZA) 18 MG/3ML Solution Pen-injector injection Taking Active   melatonin 3 MG Tab Taking Active   nystatin (MYCOSTATIN) 565833 UNIT/GM Cream  "topical cream Taking Active   pregabalin (LYRICA) 100 MG Cap Taking Active   sodium bicarbonate (SODIUM BICARBONATE) 650 MG Tab Taking Active   traZODone (DESYREL) 100 MG Tab Taking Active   triamcinolone acetonide (KENALOG) 0.1 % Cream Taking Active   vitamin D (CHOLECALCIFEROL) 1000 UNIT Tab Taking Active   ziprasidone (GEODON) 80 MG Cap Taking Active                Medication Allergy/Sensitivities:  Allergies   Allergen Reactions   • Cefdinir Shortness of Breath and Itching     Tolerated 1/18/17  Tolerates ceftriaxone    • Depakote [Divalproex Sodium] Unspecified     Muscle spasms/muscle pain and weakness     • Doxycycline Anaphylaxis and Vomiting     RXN=unknown   • Abilify Unspecified     Headaches/muscle twitching     • Amitriptyline Unspecified     Headaches     • Amoxicillin Rash     Pt states \"I get a rash\".     • Ciprofloxacin Rash     Pt states \"I get a rash\".     • Clindamycin Nausea     Even with food     • Ees [Erythromycin] Vomiting and Nausea   • Flagyl [Metronidazole Hcl] Unspecified     \"eye problems\"     • Flomax [Tamsulosin Hydrochloride] Swelling   • Metformin Unspecified     Increased lactic acid      • Sulfa Drugs Hives and Rash     RXN=since childhood  PATIENT DID FINE WITH BACTRIM X 2 COURSES 10/2018   • Tape Rash     Tears skin off   coban with Tegaderm tape ok  RXN=ongoing   • Vancomycin Itching     Pt becomes flushed in face and chest.   RXN=7/10/16   • Wound Dressing Adhesive Hives     By pt report   • Cephalexin [Keflex] Rash     Pt states she gets a rash with this medication  Tolerates ceftriaxone   • Erythromycin Rash     .   • Levofloxacin Unspecified     Leg muscle cramps   • Metronidazole Rash     .   • Valproic Acid Rash     .         Family History:  Family History   Problem Relation Age of Onset   • Hypertension Mother    • Sleep Apnea Mother    • Heart Disease Mother    • Other Mother         hypothryod   • Hypertension Maternal Uncle    • Heart Disease Maternal Grandmother  "   • Hypertension Maternal Grandmother    • No Known Problems Sister    • Other Sister         Narcolepsy;fibromyalsia;bone;nerve   • Genitourinary () Sister         endometriosis       Social History:  Social History     Social History   • Marital status: Single     Spouse name: N/A   • Number of children: N/A   • Years of education: N/A     Occupational History   • Not on file.     Social History Main Topics   • Smoking status: Never Smoker   • Smokeless tobacco: Never Used   • Alcohol use No   • Drug use: Yes     Frequency: 7.0 times per week     Types: Marijuana, Inhaled      Comment: Medicinal edible's   • Sexual activity: Not Currently     Birth control/ protection: Pill     Other Topics Concern   • Not on file     Social History Narrative    ** Merged History Encounter **          Living situation: Lives with Grandmother  PCP : Torres Brody M.D.      Physical Exam     Vitals:    10/25/18 1033 10/25/18 1034   BP: 160/98    Pulse: 84    Resp: 16    Temp: 37.5 °C (99.5 °F)    SpO2: 98%    Weight:  123 kg (271 lb 2.7 oz)     Body mass index is 45.18 kg/m².  /98   Pulse 84   Temp 37.5 °C (99.5 °F)   Resp 16   Wt 123 kg (271 lb 2.7 oz)   SpO2 98%   BMI 45.18 kg/m²   O2 therapy: Pulse Oximetry: 98 %    Physical Exam   Constitutional: She is oriented to person, place, and time and well-developed, well-nourished, and in no distress.   HENT:   Head: Normocephalic and atraumatic.   Eyes: Pupils are equal, round, and reactive to light. Conjunctivae are normal.   Neck: Normal range of motion. Neck supple.   Cardiovascular: Normal rate, regular rhythm and normal heart sounds.  Exam reveals no gallop and no friction rub.    No murmur heard.  Pulmonary/Chest: Effort normal and breath sounds normal. She has no wheezes. She has no rales. She exhibits no tenderness. Right breast exhibits no inverted nipple, no mass, no nipple discharge, no skin change and no tenderness. Left breast exhibits skin change and  tenderness. Left breast exhibits no inverted nipple, no mass and no nipple discharge. Breasts are symmetrical.   Left breast has 3 superficial wounds that are healing. There are no other skin changes. No lumps or masses are noted. No axillary lymphadenopathy.   Abdominal: Soft. Bowel sounds are normal. She exhibits no distension. There is no tenderness.   Musculoskeletal: Normal range of motion.   Lymphadenopathy:     She has no cervical adenopathy.   Neurological: She is alert and oriented to person, place, and time. No cranial nerve deficit.   Skin: No rash noted. No erythema.   Psychiatric:   Pt is emotionally liable.             Data Review       Old Records Request:  completed  Current Records review/summary: completed    Lab Data Review:  Recent Results (from the past 24 hour(s))   Lactic acid (lactate)    Collection Time: 10/25/18 11:08 AM   Result Value Ref Range    Lactic Acid 2.2 (H) 0.5 - 2.0 mmol/L   CBC WITH DIFFERENTIAL    Collection Time: 10/25/18 11:08 AM   Result Value Ref Range    WBC 5.6 4.8 - 10.8 K/uL    RBC 4.64 4.20 - 5.40 M/uL    Hemoglobin 14.3 12.0 - 16.0 g/dL    Hematocrit 41.5 37.0 - 47.0 %    MCV 89.4 81.4 - 97.8 fL    MCH 30.8 27.0 - 33.0 pg    MCHC 34.5 33.6 - 35.0 g/dL    RDW 41.5 35.9 - 50.0 fL    Platelet Count 211 164 - 446 K/uL    MPV 11.0 9.0 - 12.9 fL    Neutrophils-Polys 57.50 44.00 - 72.00 %    Lymphocytes 32.70 22.00 - 41.00 %    Monocytes 6.80 0.00 - 13.40 %    Eosinophils 1.40 0.00 - 6.90 %    Basophils 1.10 0.00 - 1.80 %    Immature Granulocytes 0.50 0.00 - 0.90 %    Nucleated RBC 0.00 /100 WBC    Neutrophils (Absolute) 3.19 2.00 - 7.15 K/uL    Lymphs (Absolute) 1.82 1.00 - 4.80 K/uL    Monos (Absolute) 0.38 0.00 - 0.85 K/uL    Eos (Absolute) 0.08 0.00 - 0.51 K/uL    Baso (Absolute) 0.06 0.00 - 0.12 K/uL    Immature Granulocytes (abs) 0.03 0.00 - 0.11 K/uL    NRBC (Absolute) 0.00 K/uL   COMP METABOLIC PANEL    Collection Time: 10/25/18 11:08 AM   Result Value Ref Range     Sodium 136 135 - 145 mmol/L    Potassium 4.1 3.6 - 5.5 mmol/L    Chloride 105 96 - 112 mmol/L    Co2 20 20 - 33 mmol/L    Anion Gap 11.0 0.0 - 11.9    Glucose 104 (H) 65 - 99 mg/dL    Bun 7 (L) 8 - 22 mg/dL    Creatinine 0.78 0.50 - 1.40 mg/dL    Calcium 10.1 8.5 - 10.5 mg/dL    AST(SGOT) 35 12 - 45 U/L    ALT(SGPT) 74 (H) 2 - 50 U/L    Alkaline Phosphatase 60 30 - 99 U/L    Total Bilirubin 0.7 0.1 - 1.5 mg/dL    Albumin 4.8 3.2 - 4.9 g/dL    Total Protein 7.3 6.0 - 8.2 g/dL    Globulin 2.5 1.9 - 3.5 g/dL    A-G Ratio 1.9 g/dL   ESTIMATED GFR    Collection Time: 10/25/18 11:08 AM   Result Value Ref Range    GFR If African American >60 >60 mL/min/1.73 m 2    GFR If Non African American >60 >60 mL/min/1.73 m 2   URINALYSIS    Collection Time: 10/25/18  1:10 PM   Result Value Ref Range    Color Yellow     Character Turbid (A)     Specific Gravity 1.020 <1.035    Ph 5.5 5.0 - 8.0    Glucose Negative Negative mg/dL    Ketones Negative Negative mg/dL    Protein Negative Negative mg/dL    Bilirubin Negative Negative    Urobilinogen, Urine 0.2 Negative    Nitrite Negative Negative    Leukocyte Esterase Small (A) Negative    Occult Blood Negative Negative    Micro Urine Req Microscopic    URINE MICROSCOPIC (W/UA)    Collection Time: 10/25/18  1:10 PM   Result Value Ref Range    WBC 2-5 /hpf    RBC 2-5 (A) /hpf    Bacteria Negative None /hpf    Epithelial Cells Few /hpf    Uric Acid Crystal Positive /hpf    Hyaline Cast 0-2 /lpf       Imaging/Procedures Review:    Independant Imaging Review: Completed  IR-PICC LINE PLACEMENT   Final Result                  Ultrasound-guided PICC placement performed by qualified nursing staff as    above.          US-CHEST   Final Result      No mass or abscess is identified in the area of palpable abnormality in the inner upper left breast.      Follow-up diagnostic evaluation of the left breast at the breast center is recommended.      DX-CHEST-PORTABLE (1 VIEW)   Final Result      No acute  cardiopulmonary process is seen.           Records reviewed and summarized in current documentation : Yes    [employer] teaching service handout given to patient: Yes           Assessment/Plan    #Left Breast Cellulitis- resolving  -Patient has extensive past history with this complaint  -No current signs of infection- afebrile, normal WBC, normal bpm, no tachypnea  -On PE there is no evidence of abscess or cellulitis. There are superficial healing wounds.   -US of L. breast shows no abscess   -Patient is very persistent on IV antibiotics  Plan:  Midline was placed.   -Give one dose of IV Clindamycin  -Observe overnight for signs of infection- fever, elevated WBC   acetaminophen for pain control    #Lactic acidosis  -Baseline level for her  Plan:  -IV saline bolus.     #Fatigue  -Pt reports sleeping 20 hours a day.   Plan:  -Discontinue melatonin   -Discontinue Trazadone    #Type 2 DM with polyneuropathy  -Chronic problem  Plan:  -Hypoglycemic protocol  -DM diet    #Stable heart related chest pain  -This is a chronic issue. No current complaints of chest pain.  Plan:   -Continue home meds- ivabradine    #Mood disorder/Schizophrenia  -Past medical history  Plan:   -Continue home medications- ziprasidone, fluoxetine    #Muscle Spasms  -Chronic problem  Plan: continue home meds- baclofen           No new Assessment & Plan notes have been filed under this hospital service since the last note was generated.  Service: Internal Medicine      Anticipated Hospital stay: Observation Admit        Quality Measures  Quality-Core Measures   Reviewed items::  Labs reviewed, Medications reviewed and Radiology images reviewed  Andrade catheter::  No Andrade  DVT prophylaxis pharmacological::  Enoxaparin (Lovenox)  Ulcer Prophylaxis::  Not indicated  Antibiotics:  Treating active infection/contamination beyond 24 hours perioperative coverage    PCP: Torres Brody M.D.

## 2018-10-25 NOTE — ED NOTES
Orders received to place PICC line for pt. Order placed in [facility], PICC team called.

## 2018-10-25 NOTE — ED TRIAGE NOTES
Chief Complaint   Patient presents with   • Wound Infection     recurrent left breast cellultitis since August. C/o increasing pain and hot to touch   • Chills     and fever 101 at home x1 week.      SIRS score 3. Orders placed per protocol. Explained triage process, to waiting room. Asked to inform RN if questions or concerns arise.

## 2018-10-25 NOTE — ED NOTES
Attempting to get IV access on pt.  Pt states ultrasound is required to get IV access. SONYA Monzon attempting to place US IV at this time.

## 2018-10-25 NOTE — SENIOR ADMIT NOTE
Senior Admission Note    In summary: Kristin Balderrama is a 28 y.o. female with past medical history of breast abscess following long history of spider bites treated with IV ceftriaxone for about two weeks during the previous hospitalization for optic neuritis then oral Bactrim with topical clindamycin cream for three weeks before presented this time to the ER with pain, tenderness in left breast associated with subjective fever 101 and chills, patient called her ID specialist who will suppose to see her on next Monday. however, patient was concerned for abscess collection and failing oral third week of Bactrim that why she came to ED.    Pertinent physical exam findings:  Mild erythema surrounding couple wounds on the left breast, associated with tenderness all over the breast on the exam. Negative for abscess, pus discharge, lymphadenopathy, nipple discharge, or bleeding.   Cushingoid face, morbid obesity, difficult to find IV line, midline placed for hydration and for one time IV antibiotics for today followed by oral clindamycin starting tomorrow     Pertinent studies:   US left breast:No mass or abscess is identified. No abnormality is identified.No mass or abscess is identified. No abnormality is identified.       Assessment and plan in summary:    1.left breast cellulitis   - Pt has no previous history of MRSA cellulitis infection  - SIRS 404 on admission:  Negative for tachycardia,  tachypnea, febrile,  leukocytosis  - Blood cultures x 2 pending, the lactic acid trend for only two times  - Repeat tomorrow CBC  - ESR, ferritin, and CRP marker    Antibiotic management   - clindamycin IV for two doses received IV ceftriaxone,  - tomorrow switched clindamycin PO  - will watch her lab, CBC, temp overnight, if abnormal will talk to ID  - bolus 2 L followed IV fluids 125ml/hr     Fever and pain management   - Tylenol           For full plan, please see Intern note for details   Manjit Patrick M.D.  PGY 3

## 2018-10-25 NOTE — ED NOTES
[facility] and PICC team at pt bedside. [facility] states pt does not need PICC line for IV antibiotic administration. PICC team agreeable to place Midline for pt, discussed between [facility] and ERP. Pt to get midline for antibiotic administration.

## 2018-10-25 NOTE — ED PROVIDER NOTES
ED Provider Note    ER PROVIDER NOTE        CHIEF COMPLAINT  Chief Complaint   Patient presents with   • Wound Infection     recurrent left breast cellultitis since August. C/o increasing pain and hot to touch   • Chills     and fever 101 at home x1 week.        HPI  Kristin Balderrama is a 28 y.o. female who presents to the emergency department complaining of recurrent fevers and breast pain.  Patient reports she has had recurrent breast cellulitis over the last month.  Has had 3 courses of Bactrim, currently on Bactrim.  Over the last 4-5 days she states she has had fever and chills.  Fever to 101 and chills.  She had no drainage from the breast.  It has become more tender and swollen.  She is also had some fatigue.  She denies any headaches, neck pain, or other rash.  No cough or dysuria.  No abdominal pain or chest pain    REVIEW OF SYSTEMS  Pertinent positives include breast pain, fever. Pertinent negatives include no headache. See HPI for details. All other systems reviewed and are negative.    PAST MEDICAL HISTORY   has a past medical history of Abdominal pain; Anginal syndrome; Apnea, sleep; Arrhythmia; Arthritis; ASTHMA; Atrial fibrillation ([facility]); Back pain; Borderline personality disorder ([facility]); Breath shortness; Cardiac arrhythmia; Chickenpox; Chronic UTI (9/18/2010); Cough; Daytime sleepiness; Depression; Diabetes ([facility]); Diarrhea; Disorder of thyroid; Fall; Fatigue; Frequent headaches; Gasping for breath; Gynecological disorder; Headache(784.0); Heart burn; History of falling; Hypertension; Migraine; 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[facility]); 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[facility]); Nausea; Obesity; Pain (08-15-12); Painful joint; PCOS (polycystic ovarian syndrome); Pneumonia (2012); Psychosis ([facility]); Renal disorder; Ringing in ears; Scoliosis; Shortness of breath; Sinus tachycardia (10/31/2013); Sleep apnea; Snoring; Tonsillitis; Tuberculosis; Urinary bladder disorder; Urinary incontinence; Weakness; and Wears  glasses.    SURGICAL HISTORY   has a past surgical history that includes neuro dest facet l/s w/ig sngl (4/21/2015); recovery (1/27/2016); [place] by laparoscopy (8/29/2010); lumbar fusion anterior (8/21/2012); other cardiac surgery (1/2016); tonsillectomy; bowel stimulator insertion (7/13/2016); gastroscopy with balloon dilatation (N/A, 1/4/2017); muscle biopsy (Right, 1/26/2017); other abdominal surgery; and laminotomy.    FAMILY HISTORY  Family History   Problem Relation Age of Onset   • Hypertension Mother    • Sleep Apnea Mother    • Heart Disease Mother    • Other Mother         hypothryod   • Hypertension Maternal Uncle    • Heart Disease Maternal Grandmother    • Hypertension Maternal Grandmother    • No Known Problems Sister    • Other Sister         Narcolepsy;fibromyalsia;bone;nerve   • Genitourinary () Sister         endometriosis       SOCIAL HISTORY  Social History     Social History   • Marital status: Single     Spouse name: N/A   • Number of children: N/A   • Years of education: N/A     Social History Main Topics   • Smoking status: Never Smoker   • Smokeless tobacco: Never Used   • Alcohol use No   • Drug use: Yes     Frequency: 7.0 times per week     Types: Marijuana, Inhaled      Comment: Medicinal edible's   • Sexual activity: Not Currently     Birth control/ protection: Pill     Other Topics Concern   • Not on file     Social History Narrative    ** Merged History Encounter **           History   Drug Use   • Frequency: 7.0 times per week   • Types: Marijuana, Inhaled     Comment: Medicinal edible's       CURRENT MEDICATIONS  Home Medications     Reviewed by Lorenzo Abdul R.N. (Registered Nurse) on 10/25/18 at [facility]  Med List Status: <None>   Medication Last Dose Status   aspirin EC (ECOTRIN) 81 MG Tablet Delayed Response Taking Active   baclofen (LIORESAL) 10 MG Tab Taking Active   busPIRone (BUSPAR) 10 MG Tab Taking Active   cholestyramine (QUESTRAN) 4 g packet Not Taking Active  "  cholestyramine (QUESTRAN,PREVALITE) 4 GM Pack Taking Active   cyanocobalamin ([employer] VITAMIN B-12) 500 MCG tablet Taking Active   fluoxetine (PROZAC) 10 MG Cap Taking Active   furosemide (LASIX) 40 MG Tab Taking Active   ivabradine (CORLANOR) 5 MG Tab tablet Taking Active   liraglutide (VICTOZA) 18 MG/3ML Solution Pen-injector injection Taking Active   melatonin 3 MG Tab Taking Active   nystatin (MYCOSTATIN) 299567 UNIT/GM Cream topical cream Taking Active   pregabalin (LYRICA) 100 MG Cap Taking Active   sodium bicarbonate (SODIUM BICARBONATE) 650 MG Tab Taking Active   traZODone (DESYREL) 100 MG Tab Taking Active   triamcinolone acetonide (KENALOG) 0.1 % Cream Taking Active   vitamin D (CHOLECALCIFEROL) 1000 UNIT Tab Taking Active   ziprasidone (GEODON) 80 MG Cap Taking Active                ALLERGIES  Allergies   Allergen Reactions   • Cefdinir Shortness of Breath and Itching     Tolerated 1/18/17  Tolerates ceftriaxone    • Depakote [Divalproex Sodium] Unspecified     Muscle spasms/muscle pain and weakness     • Doxycycline Anaphylaxis and Vomiting     RXN=unknown   • Abilify Unspecified     Headaches/muscle twitching     • Amitriptyline Unspecified     Headaches     • Amoxicillin Rash     Pt states \"I get a rash\".     • Ciprofloxacin Rash     Pt states \"I get a rash\".     • Clindamycin Nausea     Even with food     • Ees [Erythromycin] Vomiting and Nausea   • Flagyl [Metronidazole Hcl] Unspecified     \"eye problems\"     • Flomax [Tamsulosin Hydrochloride] Swelling   • Metformin Unspecified     Increased lactic acid      • Sulfa Drugs Hives and Rash     RXN=since childhood  PATIENT DID FINE WITH BACTRIM X 2 COURSES 10/2018   • Tape Rash     Tears skin off   coban with Tegaderm tape ok  RXN=ongoing   • Vancomycin Itching     Pt becomes flushed in face and chest.   RXN=7/10/16   • Wound Dressing Adhesive Hives     By pt report   • Cephalexin [Keflex] Rash     Pt states she gets a rash with this medication  Tolerates " ceftriaxone   • Erythromycin Rash     .   • Levofloxacin Unspecified     Leg muscle cramps   • Metronidazole Rash     .   • Valproic Acid Rash     .       PHYSICAL EXAM  VITAL SIGNS: /98   Pulse 84   Temp 37.5 °C (99.5 °F)   Resp 16   Wt 123 kg (271 lb 2.7 oz)   SpO2 98%   BMI 45.18 kg/m²   Pulse ox interpretation: I interpret this pulse ox as normal.    Constitutional: Alert in no apparent distress.  HENT: No signs of trauma, Bilateral external ears normal, Nose normal.   Eyes: Pupils are equal and reactive, Conjunctiva normal, Non-icteric.   Neck: Normal range of motion, No tenderness, Supple, No stridor.   Lymphatic: No lymphadenopathy noted.   Cardiovascular: Regular rate and rhythm, no murmurs.   Thorax & Lungs: Normal breath sounds, No respiratory distress, No wheezing, No chest tenderness.   Abdomen: Bowel sounds normal, Soft, No tenderness, No masses, No pulsatile masses. No peritoneal signs.  Skin: 3 lesions to left breast with surrounding erythema, approximately 1.5 cm.  No fluctuance or induration  Back: No bony tenderness, No CVA tenderness.   Extremities: Intact distal pulses, No edema, No tenderness, No cyanosis, Negative Dhiraj's sign.  Musculoskeletal: Good range of motion in all major joints. No tenderness to palpation or major deformities noted.   Neurologic: Alert , Normal motor function, Normal sensory function, No focal deficits noted.   Psychiatric: Affect normal, Judgment normal, Mood normal.     DIAGNOSTIC STUDIES / PROCEDURES        LABS  Labs Reviewed   LACTIC ACID - Abnormal; Notable for the following:        Result Value    Lactic Acid 2.2 (*)     All other components within normal limits   COMP METABOLIC PANEL - Abnormal; Notable for the following:     Glucose 104 (*)     Bun 7 (*)     ALT(SGPT) 74 (*)     All other components within normal limits   URINALYSIS - Abnormal; Notable for the following:     Character Turbid (*)     Leukocyte Esterase Small (*)     All other  "components within normal limits    Narrative:     Indication for culture:->Emergency Room Patient   CBC WITH DIFFERENTIAL   URINE CULTURE(NEW)    Narrative:     Indication for culture:->Emergency Room Patient   BLOOD CULTURE    Narrative:     Per Hospital Policy: Only change Specimen Src: to \"Line\" if  specified by physician order.   ESTIMATED GFR   INFLUENZA A/B BY PCR    Narrative:     Indication for culture:->Emergency Room Patient   URINE MICROSCOPIC (W/UA)    Narrative:     Indication for culture:->Emergency Room Patient   LACTIC ACID   BLOOD CULTURE   APTT   PROTHROMBIN TIME       All labs reviewed by me.    RADIOLOGY  US-CHEST   Final Result      No mass or abscess is identified in the area of palpable abnormality in the inner upper left breast.      Follow-up diagnostic evaluation of the left breast at the breast center is recommended.      DX-CHEST-PORTABLE (1 VIEW)   Final Result      No acute cardiopulmonary process is seen.      IR-PICC LINE PLACEMENT    (Results Pending)     The radiologist's interpretation of all radiological studies have been reviewed by me.    COURSE & MEDICAL DECISION MAKING  Nursing notes, VS, PMSFHx reviewed in chart.    11:53 AM Patient seen and examined at bedside. Patient will be treated with ceftriaxone and clindamycin. Ordered for sepsis bundle, ultrasound to evaluate her symptoms.    Discussed antibiotic selection with pharmacy due to her multiple allergies    Patient is difficult IV access.  Will pursue PICC line placement as she will need IV antibiotics and clinical condition does not necessitate a central line at this time    1:58 PM patient reevaluated and updated on results thus far    Discussed case with [facility] for admission          Decision Making:  This is a 28 y.o. female presenting with recurrent cellulitis and fevers at home.  She has failed outpatient oral antibiotics on Bactrim continue to have worsening cellulitis as well as these fevers.  At this point she will " be admitted for IV antibiotics as she is failed outpatient treatment.  She does have a slight elevation in her lactate although no leukocytosis tachycardia or hypotension suggestive of severe sepsis.  No other source detected at this time she has a negative influenza unremarkable urinalysis and chest x-ray.  Additionally her ultrasound shows no abscess or deep space infection    Patient admitted in stable condition    FINAL IMPRESSION  1. Cellulitis, unspecified cellulitis site          The note accurately reflects work and decisions made by me.  Torres Roberson  10/25/2018  2:25 PM

## 2018-10-25 NOTE — ED NOTES
"Pt ambulated to room with steady gait. Pt walks with 4WW, right foot ankle stabilization boot. Pt alert and oriented x 4. C/o open wounds to the left breast, redness, tenderness. 3 wounds noted to left breast, one pt states is from tape removal, the 2 others are \"old wounds that have opened up and they look angry\".  "

## 2018-10-25 NOTE — PROCEDURES
Vascular Access Team    Date of Insertion: 10/25/18  Arm Circumference: 41.5  Internal length: 19  External Length: 0  Vein Occupancy %: 30  Reason for Midline: lack of access   Labs: WBC 5.6, , INR not available , BUN 7, Cr 0.78, GFR >60    Orders confirmed, vessel patency confirmed with ultrasound. Risks and benefits of procedure explained to patient and education regarding line associated bloodstream infections provided. Questions answered.     Power Midline placed in LUE per MD order with ultrasound guidance, initial arm circumference 41.5cm. 4 Fr, single lumen Power Midline placed in basilic vein after 1 attempt(s). 2 cc's of 1% lidocaine injected intradermally, 21 gauge microintroducer needle and modified Seldinger technique used. 19 cm catheter inserted with good blood return. Secured at 0 cm marker. Each lumen flushed without resistance with 10 mL 0.9% normal saline. Midline secured with Biopatch and Tegaderm.     Midline placement is confirmed by nurse using ultrasound and ability to flush and draw blood. Midline is appropriate for use at this time.  No X-ray is needed for placement confirmation. Pt tolerated procedure well.  Patient condition relayed to unit RN or ordering physician via this post procedure note in the EMR.     Ultrasound images uploaded to PACS and viewable in the EMR - yes  Ultrasound imaged printed and placed in paper chart - no     BARD Power Midline ref # [id number], Lot # [id number]

## 2018-10-25 NOTE — H&P
Internal Medicine Admitting History and Physical    Note Author: Bassam Rojas M.D.       Name Kristin Balderrama     1990   Age/Sex 28 y.o. female   MRN [id number]   Code Status full     After 5PM or if no immediate response to page, please call for cross-coverage  Attending/Team: Lyle/Sam See Patient List for primary contact information  Call [phone] to page    1st Call - Day Intern (R1):   Bob 2nd Call - Day Sr. Resident (R2/R3):   Darnell       Chief Complaint:   Left breast cellulitis    HPI:  27F, PMH of mitochondrial disorder with dropped foot, DM, Afib, chronic back pain s/p surgery, arthritis, hemiplegic migraines, asthma, TONYA, anxiety, depression, schizophrenia, presents with left cellulitis. She had a recent admission for left breast cellulitis on  treated with rocephin  Until  with 4 days of home infusion, had received 1 dose of IV clindamycin and linezolid at that time. Patient presenting with 1 week of worsening cellulitis with diffusely tender left breast with 3-4x dry scabbing 1-3cm wounds without purulent discharge/warmth but some surrounding erythema. She has associated fever to 101 measured at home, chills, nausea, yellow emesis, and increased fatigue. She was scheduled to follow up with her ID physician on Monday and called her office which referred her to be evaluated today. She had completed a recent course of prednisone taper completed on 10/03 following hospitalization on  for presumed optic pathology. She had been seen outpatient and cellulitis was refractory to 10 days of bactrim and topical clindamycin/bactroban.     Review of Systems   Constitutional: Positive for chills, fever and malaise/fatigue.   HENT: Negative for ear pain, hearing loss, sinus pain and sore throat.    Eyes: Negative for blurred vision, double vision and pain.        Vision changes in left eye similar to previous admission, patient reports outpatient follow up scheduled, no  acute changes   Respiratory: Negative for cough, shortness of breath and wheezing.    Cardiovascular: Negative for chest pain and palpitations.   Gastrointestinal: Positive for nausea and vomiting. Negative for abdominal pain, blood in stool, constipation, diarrhea and melena.   Genitourinary: Negative for dysuria and hematuria.   Musculoskeletal: Positive for joint pain and myalgias.        Chronic myalgias, joint pain   Skin: Positive for itching and rash.        With left breast cellulitis   Neurological: Positive for dizziness. Negative for headaches.        Some dizziness with current presentation   Endo/Heme/Allergies: Negative for polydipsia. Does not bruise/bleed easily.   Psychiatric/Behavioral: Negative for depression. The patient is not nervous/anxious.         Medication managed             Past Medical History (Chronic medical problem, known complications and current treatment)    mitochondrial disorder with dropped foot  DM  Afib  chronic back pain s/p surgery  Arthritis  hemiplegic migraines  Asthma  TONYA  Anxiety  depression     Past Surgical History:  Past Surgical History:   Procedure Laterality Date   • MUSCLE BIOPSY Right 1/26/2017    Procedure: MUSCLE BIOPSY - THIGH;  Surgeon: Isidro Vigil M.D.;  Location: SURGERY [facility];  Service:    • GASTROSCOPY WITH BALLOON DILATATION N/A 1/4/2017    Procedure: GASTROSCOPY WITH DILATATION;  Surgeon: Torres Vargas M.D.;  Location: [facility];  Service:    • BOWEL STIMULATOR INSERTION  7/13/2016    Procedure: BOWEL STIMULATOR INSERTION FOR PERMANENT INTERSTIM SACRAL IMPLANT;  Surgeon: Joe Noyola M.D.;  Location: [facility];  Service:    • RECOVERY  1/27/2016    Procedure: CATH LAB EP STUDY WITH SINUS NODE MODIFICATION ABHINAV;  Surgeon: Recoveryonly Surgery;  Location: SURGERY PRE-POST PROC UNIT [facility] [place];  Service:    • OTHER CARDIAC SURGERY  1/2016    cardiac ablation   • NEURO DEST FACET L/S W/IG SNGL  4/21/2015    Performed  by Reza Tabor at SURGERY [facility]   • LUMBAR FUSION ANTERIOR  8/21/2012    Performed by SUSIE SAWANT at SURGERY [facility] ORS   • ALYSSA BY LAPAROSCOPY  8/29/2010    Performed by SHAYY JOHNS at SURGERY [facility] ORS   • LAMINOTOMY     • OTHER ABDOMINAL SURGERY     • TONSILLECTOMY      tonsillectomy       Current Outpatient Medications:  Home Medications     Reviewed by Consuelo Vaca R.N. (Registered Nurse) on 10/25/18 at 1709  Med List Status: Complete   Medication Last Dose Status   aspirin EC (ECOTRIN) 81 MG Tablet Delayed Response Taking Active   baclofen (LIORESAL) 10 MG Tab Taking Active   busPIRone (BUSPAR) 10 MG Tab Taking Active   cholestyramine (QUESTRAN) 4 g packet Not Taking Active   cholestyramine (QUESTRAN,PREVALITE) 4 GM Pack Taking Active   cyanocobalamin ([employer] VITAMIN B-12) 500 MCG tablet Taking Active   fluoxetine (PROZAC) 10 MG Cap Taking Active   furosemide (LASIX) 40 MG Tab Taking Active   ivabradine (CORLANOR) 5 MG Tab tablet Taking Active   liraglutide (VICTOZA) 18 MG/3ML Solution Pen-injector injection Taking Active   melatonin 3 MG Tab Taking Active   nystatin (MYCOSTATIN) 500474 UNIT/GM Cream topical cream Taking Active   pregabalin (LYRICA) 100 MG Cap Taking Active   sodium bicarbonate (SODIUM BICARBONATE) 650 MG Tab Taking Active   traZODone (DESYREL) 100 MG Tab Taking Active   triamcinolone acetonide (KENALOG) 0.1 % Cream Taking Active   vitamin D (CHOLECALCIFEROL) 1000 UNIT Tab Taking Active   ziprasidone (GEODON) 80 MG Cap Taking Active                Medication Allergy/Sensitivities:  Allergies   Allergen Reactions   • Cefdinir Shortness of Breath and Itching     Tolerated 1/18/17  Tolerates ceftriaxone    • Depakote [Divalproex Sodium] Unspecified     Muscle spasms/muscle pain and weakness     • Doxycycline Anaphylaxis and Vomiting     RXN=unknown   • Abilify Unspecified     Headaches/muscle twitching     • Amitriptyline Unspecified     Headaches     •  "Amoxicillin Rash     Pt states \"I get a rash\".     • Ciprofloxacin Rash     Pt states \"I get a rash\".     • Clindamycin Nausea     Even with food     • Ees [Erythromycin] Vomiting and Nausea   • Flagyl [Metronidazole Hcl] Unspecified     \"eye problems\"     • Flomax [Tamsulosin Hydrochloride] Swelling   • Metformin Unspecified     Increased lactic acid      • Sulfa Drugs Hives and Rash     RXN=since childhood  PATIENT DID FINE WITH BACTRIM X 2 COURSES 10/2018   • Tape Rash     Tears skin off   coban with Tegaderm tape ok  RXN=ongoing   • Vancomycin Itching     Pt becomes flushed in face and chest.   RXN=7/10/16   • Wound Dressing Adhesive Hives     By pt report   • Cephalexin [Keflex] Rash     Pt states she gets a rash with this medication  Tolerates ceftriaxone   • Erythromycin Rash     .   • Levofloxacin Unspecified     Leg muscle cramps   • Metronidazole Rash     .   • Valproic Acid Rash     .         Family History (mandatory)   Family History   Problem Relation Age of Onset   • Hypertension Mother    • Sleep Apnea Mother    • Heart Disease Mother    • Other Mother         hypothryod   • Hypertension Maternal Uncle    • Heart Disease Maternal Grandmother    • Hypertension Maternal Grandmother    • No Known Problems Sister    • Other Sister         Narcolepsy;fibromyalsia;bone;nerve   • Genitourinary () Sister         endometriosis       Social History (mandatory)   Social History     Social History   • Marital status: Single     Spouse name: N/A   • Number of children: N/A   • Years of education: N/A     Occupational History   • Not on file.     Social History Main Topics   • Smoking status: Never Smoker   • Smokeless tobacco: Never Used   • Alcohol use No   • Drug use: Yes     Frequency: 7.0 times per week     Types: Marijuana, Inhaled      Comment: Medicinal edible's   • Sexual activity: Not Currently     Birth control/ protection: Pill     Other Topics Concern   • Not on file     Social History Narrative    " ** Merged History Encounter **          Living situation: Lives in [place] with grandparents, aunt & uncle  PCP : Torres Brody M.D.    Physical Exam     Vitals:    10/25/18 1033 10/25/18 1034 10/25/18 1623   BP: 160/98  145/79   Pulse: 84  72   Resp: 16  17   Temp: 37.5 °C (99.5 °F)  36.5 °C (97.7 °F)   SpO2: 98%  97%   Weight:  123 kg (271 lb 2.7 oz)      Body mass index is 45.18 kg/m².  /79   Pulse 72   Temp 36.5 °C (97.7 °F)   Resp 17   Wt 123 kg (271 lb 2.7 oz)   SpO2 97%   Breastfeeding? No   BMI 45.18 kg/m²   O2 therapy: Pulse Oximetry: 97 %, O2 (LPM): 0, O2 Delivery: None (Room Air)    Physical Exam   Constitutional: She is oriented to person, place, and time and well-developed, well-nourished, and in no distress. No distress.   Somewhat cushingoid appearance   HENT:   Head: Normocephalic and atraumatic.   Mouth/Throat: Oropharynx is clear and moist. No oropharyngeal exudate.   MP4   Eyes: Pupils are equal, round, and reactive to light. Conjunctivae and EOM are normal. Right eye exhibits no discharge. Left eye exhibits no discharge. No scleral icterus.   Limited optic exam, no papilledema noted   Neck: Normal range of motion. Neck supple. No tracheal deviation present.   Cardiovascular: Normal rate, regular rhythm, normal heart sounds and intact distal pulses.  Exam reveals no gallop and no friction rub.    No murmur heard.  Pulmonary/Chest: Effort normal and breath sounds normal. No respiratory distress. She has no wheezes. She has no rales. She exhibits no tenderness.   Abdominal: Soft. Bowel sounds are normal. She exhibits no distension and no mass. There is no tenderness. There is no rebound and no guarding.   Abdominal striae   Musculoskeletal: Normal range of motion. She exhibits no edema, tenderness or deformity.   Neurological: She is alert and oriented to person, place, and time. She exhibits normal muscle tone. Coordination normal.   Skin: Skin is warm and dry. Rash noted. She is not  diaphoretic. There is erythema. No pallor.   diffusely tender left breast with 3-4x dry scabbing 1-3cm wounds without purulent discharge/warmth but some surrounding erythema, no nipple discharge, no masses/lympadenopathy noted on bilateral breast exam   Psychiatric: Mood and affect normal.         Data Review       Old Records Request:   Deferred  Current Records review/summary: Completed    Lab Data Review:  Recent Results (from the past 24 hour(s))   Lactic acid (lactate)    Collection Time: 10/25/18 11:08 AM   Result Value Ref Range    Lactic Acid 2.2 (H) 0.5 - 2.0 mmol/L   CBC WITH DIFFERENTIAL    Collection Time: 10/25/18 11:08 AM   Result Value Ref Range    WBC 5.6 4.8 - 10.8 K/uL    RBC 4.64 4.20 - 5.40 M/uL    Hemoglobin 14.3 12.0 - 16.0 g/dL    Hematocrit 41.5 37.0 - 47.0 %    MCV 89.4 81.4 - 97.8 fL    MCH 30.8 27.0 - 33.0 pg    MCHC 34.5 33.6 - 35.0 g/dL    RDW 41.5 35.9 - 50.0 fL    Platelet Count 211 164 - 446 K/uL    MPV 11.0 9.0 - 12.9 fL    Neutrophils-Polys 57.50 44.00 - 72.00 %    Lymphocytes 32.70 22.00 - 41.00 %    Monocytes 6.80 0.00 - 13.40 %    Eosinophils 1.40 0.00 - 6.90 %    Basophils 1.10 0.00 - 1.80 %    Immature Granulocytes 0.50 0.00 - 0.90 %    Nucleated RBC 0.00 /100 WBC    Neutrophils (Absolute) 3.19 2.00 - 7.15 K/uL    Lymphs (Absolute) 1.82 1.00 - 4.80 K/uL    Monos (Absolute) 0.38 0.00 - 0.85 K/uL    Eos (Absolute) 0.08 0.00 - 0.51 K/uL    Baso (Absolute) 0.06 0.00 - 0.12 K/uL    Immature Granulocytes (abs) 0.03 0.00 - 0.11 K/uL    NRBC (Absolute) 0.00 K/uL   COMP METABOLIC PANEL    Collection Time: 10/25/18 11:08 AM   Result Value Ref Range    Sodium 136 135 - 145 mmol/L    Potassium 4.1 3.6 - 5.5 mmol/L    Chloride 105 96 - 112 mmol/L    Co2 20 20 - 33 mmol/L    Anion Gap 11.0 0.0 - 11.9    Glucose 104 (H) 65 - 99 mg/dL    Bun 7 (L) 8 - 22 mg/dL    Creatinine 0.78 0.50 - 1.40 mg/dL    Calcium 10.1 8.5 - 10.5 mg/dL    AST(SGOT) 35 12 - 45 U/L    ALT(SGPT) 74 (H) 2 - 50 U/L     Alkaline Phosphatase 60 30 - 99 U/L    Total Bilirubin 0.7 0.1 - 1.5 mg/dL    Albumin 4.8 3.2 - 4.9 g/dL    Total Protein 7.3 6.0 - 8.2 g/dL    Globulin 2.5 1.9 - 3.5 g/dL    A-G Ratio 1.9 g/dL   ESTIMATED GFR    Collection Time: 10/25/18 11:08 AM   Result Value Ref Range    GFR If African American >60 >60 mL/min/1.73 m 2    GFR If Non African American >60 >60 mL/min/1.73 m 2   URINALYSIS    Collection Time: 10/25/18  1:10 PM   Result Value Ref Range    Color Yellow     Character Turbid (A)     Specific Gravity 1.020 <1.035    Ph 5.5 5.0 - 8.0    Glucose Negative Negative mg/dL    Ketones Negative Negative mg/dL    Protein Negative Negative mg/dL    Bilirubin Negative Negative    Urobilinogen, Urine 0.2 Negative    Nitrite Negative Negative    Leukocyte Esterase Small (A) Negative    Occult Blood Negative Negative    Micro Urine Req Microscopic    URINE MICROSCOPIC (W/UA)    Collection Time: 10/25/18  1:10 PM   Result Value Ref Range    WBC 2-5 /hpf    RBC 2-5 (A) /hpf    Bacteria Negative None /hpf    Epithelial Cells Few /hpf    Uric Acid Crystal Positive /hpf    Hyaline Cast 0-2 /lpf   LACTIC ACID    Collection Time: 10/25/18  4:50 PM   Result Value Ref Range    Lactic Acid 1.6 0.5 - 2.0 mmol/L   CRP QUANTITIVE (NON-CARDIAC)    Collection Time: 10/25/18  4:50 PM   Result Value Ref Range    Stat C-Reactive Protein 0.64 0.00 - 0.75 mg/dL   LDH    Collection Time: 10/25/18  4:50 PM   Result Value Ref Range    LDH Total 209 107 - 266 U/L   ACCU-CHEK GLUCOSE    Collection Time: 10/25/18  5:52 PM   Result Value Ref Range    Glucose - Accu-Ck 78 65 - 99 mg/dL       Imaging/Procedures Review:    Independant Imaging Review: Completed  IR-PICC LINE PLACEMENT   Final Result                  Ultrasound-guided PICC placement performed by qualified nursing staff as    above.          US-CHEST   Final Result      No mass or abscess is identified in the area of palpable abnormality in the inner upper left breast.      Follow-up  diagnostic evaluation of the left breast at the breast center is recommended.      DX-CHEST-PORTABLE (1 VIEW)   Final Result      No acute cardiopulmonary process is seen.               EKG:   EKG Independant Review: none  QTc:-, HR: -, Normal Sinus Rhythm, no ST/T changes -    Records reviewed and summarized in current documentation :  Yes  [employer] teaching service handout given to patient:  Yes         Assessment/Plan     Cellulitis of left breast- (present on admission)   Assessment & Plan    -patient with left breast cellulitis, with prior admission s/p treatment with ceftriaxone with resolution, recurrence and refractory to 10 days of bactrim and topical clindamycin after recent course of prednisone  -SIRS 0/4, lactic acid 2.2 downtrended to normal    -IV ceftriaxone and clindamycin with plan to transition to oral clindamycin on 10/26  -ESR, CRP, ferritin, LDH for inflammatory markers  -IVF bolus, maintenance fluids  -blood cx x2        TONYA (obstructive sleep apnea)- (present on admission)   Assessment & Plan    -patient on O2 at home until medicare stopped supplying  -will monitor for O2 need this admission        Chronic pain syndrome- (present on admission)   Assessment & Plan    -continue home dose baclofen            Peripheral neuropathy (CMS-HCC)- (present on admission)   Assessment & Plan    -continue home does pregabalin            Depression- (present on admission)   Assessment & Plan    -continue home dose trazadone            Psychosis, schizophrenia, simple (HCC)- (present on admission)   Assessment & Plan    -continue home does geodon        Anxiety- (present on admission)   Assessment & Plan    -continue home dose fluoxetine                Anticipated Hospital stay: Observation admit        Quality Measures  Quality-Core Measures   Reviewed items::  Labs reviewed, Medications reviewed and Radiology images reviewed  Andrade catheter::  No Andrade  DVT prophylaxis pharmacological::  Enoxaparin  ([facility])    PCP: Torres Brody M.D.

## 2018-10-25 NOTE — PROGRESS NOTES
Assumed care of pt today at 16:30. Pt is A&O x4. Pt reports pain 9/10 to Left breast, heat/ice offered. Bed alarm in use as pt states she is a high fall risk. Pt is in room near nursing station. Pt has a midline in the L upper arm running NS. Admit profile completed. Assessment completed.

## 2018-10-26 VITALS
OXYGEN SATURATION: 92 % | TEMPERATURE: 97.3 F | HEART RATE: 75 BPM | BODY MASS INDEX: 45.36 KG/M2 | WEIGHT: 272.27 LBS | HEIGHT: 65 IN | RESPIRATION RATE: 18 BRPM | DIASTOLIC BLOOD PRESSURE: 60 MMHG | SYSTOLIC BLOOD PRESSURE: 117 MMHG

## 2018-10-26 PROBLEM — R82.71 ASYMPTOMATIC BACTERIURIA: Status: ACTIVE | Noted: 2018-10-26

## 2018-10-26 LAB
ALBUMIN SERPL BCP-MCNC: 4.1 G/DL (ref 3.2–4.9)
ALBUMIN/GLOB SERPL: 1.9 G/DL
ALP SERPL-CCNC: 52 U/L (ref 30–99)
ALT SERPL-CCNC: 59 U/L (ref 2–50)
ANION GAP SERPL CALC-SCNC: 10 MMOL/L (ref 0–11.9)
AST SERPL-CCNC: 27 U/L (ref 12–45)
BASOPHILS # BLD AUTO: 1.2 % (ref 0–1.8)
BASOPHILS # BLD: 0.06 K/UL (ref 0–0.12)
BILIRUB SERPL-MCNC: 0.6 MG/DL (ref 0.1–1.5)
BUN SERPL-MCNC: 8 MG/DL (ref 8–22)
CALCIUM SERPL-MCNC: 9.2 MG/DL (ref 8.5–10.5)
CHLORIDE SERPL-SCNC: 108 MMOL/L (ref 96–112)
CO2 SERPL-SCNC: 21 MMOL/L (ref 20–33)
CREAT SERPL-MCNC: 0.82 MG/DL (ref 0.5–1.4)
EOSINOPHIL # BLD AUTO: 0.06 K/UL (ref 0–0.51)
EOSINOPHIL NFR BLD: 1.2 % (ref 0–6.9)
ERYTHROCYTE [DISTWIDTH] IN BLOOD BY AUTOMATED COUNT: 42.5 FL (ref 35.9–50)
GLOBULIN SER CALC-MCNC: 2.2 G/DL (ref 1.9–3.5)
GLUCOSE BLD-MCNC: 107 MG/DL (ref 65–99)
GLUCOSE BLD-MCNC: 135 MG/DL (ref 65–99)
GLUCOSE SERPL-MCNC: 103 MG/DL (ref 65–99)
HCT VFR BLD AUTO: 37.6 % (ref 37–47)
HGB BLD-MCNC: 12.7 G/DL (ref 12–16)
IMM GRANULOCYTES # BLD AUTO: 0.02 K/UL (ref 0–0.11)
IMM GRANULOCYTES NFR BLD AUTO: 0.4 % (ref 0–0.9)
LYMPHOCYTES # BLD AUTO: 2.12 K/UL (ref 1–4.8)
LYMPHOCYTES NFR BLD: 42.2 % (ref 22–41)
MCH RBC QN AUTO: 30.8 PG (ref 27–33)
MCHC RBC AUTO-ENTMCNC: 33.8 G/DL (ref 33.6–35)
MCV RBC AUTO: 91 FL (ref 81.4–97.8)
MONOCYTES # BLD AUTO: 0.44 K/UL (ref 0–0.85)
MONOCYTES NFR BLD AUTO: 8.8 % (ref 0–13.4)
NEUTROPHILS # BLD AUTO: 2.32 K/UL (ref 2–7.15)
NEUTROPHILS NFR BLD: 46.2 % (ref 44–72)
NRBC # BLD AUTO: 0 K/UL
NRBC BLD-RTO: 0 /100 WBC
PLATELET # BLD AUTO: 175 K/UL (ref 164–446)
PMV BLD AUTO: 10.9 FL (ref 9–12.9)
POTASSIUM SERPL-SCNC: 3.8 MMOL/L (ref 3.6–5.5)
PROT SERPL-MCNC: 6.3 G/DL (ref 6–8.2)
RBC # BLD AUTO: 4.13 M/UL (ref 4.2–5.4)
SODIUM SERPL-SCNC: 139 MMOL/L (ref 135–145)
WBC # BLD AUTO: 5 K/UL (ref 4.8–10.8)

## 2018-10-26 PROCEDURE — 80053 COMPREHEN METABOLIC PANEL: CPT

## 2018-10-26 PROCEDURE — A9270 NON-COVERED ITEM OR SERVICE: HCPCS | Performed by: INTERNAL MEDICINE

## 2018-10-26 PROCEDURE — 96376 TX/PRO/DX INJ SAME DRUG ADON: CPT

## 2018-10-26 PROCEDURE — 99217 PR OBSERVATION CARE DISCHARGE: CPT | Mod: GC | Performed by: INTERNAL MEDICINE

## 2018-10-26 PROCEDURE — A9270 NON-COVERED ITEM OR SERVICE: HCPCS | Performed by: STUDENT IN AN ORGANIZED HEALTH CARE EDUCATION/TRAINING PROGRAM

## 2018-10-26 PROCEDURE — 700102 HCHG RX REV CODE 250 W/ 637 OVERRIDE(OP): Performed by: INTERNAL MEDICINE

## 2018-10-26 PROCEDURE — 700102 HCHG RX REV CODE 250 W/ 637 OVERRIDE(OP): Mod: JG | Performed by: STUDENT IN AN ORGANIZED HEALTH CARE EDUCATION/TRAINING PROGRAM

## 2018-10-26 PROCEDURE — G0378 HOSPITAL OBSERVATION PER HR: HCPCS

## 2018-10-26 PROCEDURE — 700111 HCHG RX REV CODE 636 W/ 250 OVERRIDE (IP): Performed by: STUDENT IN AN ORGANIZED HEALTH CARE EDUCATION/TRAINING PROGRAM

## 2018-10-26 PROCEDURE — 36415 COLL VENOUS BLD VENIPUNCTURE: CPT

## 2018-10-26 PROCEDURE — 96372 THER/PROPH/DIAG INJ SC/IM: CPT

## 2018-10-26 PROCEDURE — 82962 GLUCOSE BLOOD TEST: CPT

## 2018-10-26 PROCEDURE — 85025 COMPLETE CBC W/AUTO DIFF WBC: CPT

## 2018-10-26 RX ADMIN — ASPIRIN 81 MG: 81 TABLET, COATED ORAL at 05:22

## 2018-10-26 RX ADMIN — ENOXAPARIN SODIUM 40 MG: 100 INJECTION SUBCUTANEOUS at 05:19

## 2018-10-26 RX ADMIN — FLUOXETINE 10 MG: 10 CAPSULE ORAL at 05:22

## 2018-10-26 RX ADMIN — CLINDAMYCIN HYDROCHLORIDE 450 MG: 150 CAPSULE ORAL at 05:21

## 2018-10-26 RX ADMIN — BUSPIRONE HYDROCHLORIDE 5 MG: 10 TABLET ORAL at 05:21

## 2018-10-26 RX ADMIN — ONDANSETRON 4 MG: 2 INJECTION INTRAMUSCULAR; INTRAVENOUS at 07:57

## 2018-10-26 RX ADMIN — BACLOFEN 10 MG: 10 TABLET ORAL at 05:20

## 2018-10-26 RX ADMIN — CYANOCOBALAMIN TAB 500 MCG 500 MCG: 500 TAB at 05:22

## 2018-10-26 RX ADMIN — FUROSEMIDE 40 MG: 40 TABLET ORAL at 05:21

## 2018-10-26 RX ADMIN — PREGABALIN 100 MG: 100 CAPSULE ORAL at 05:22

## 2018-10-26 RX ADMIN — ZIPRASIDONE HCL 80 MG: 40 CAPSULE ORAL at 05:20

## 2018-10-26 RX ADMIN — VITAMIN D, TAB 1000IU (100/BT) 1000 UNITS: 25 TAB at 05:21

## 2018-10-26 RX ADMIN — SODIUM BICARBONATE 650 MG: 650 TABLET ORAL at 05:20

## 2018-10-26 ASSESSMENT — ENCOUNTER SYMPTOMS
BLURRED VISION: 1
FEVER: 0
HEADACHES: 0
ABDOMINAL PAIN: 0
WHEEZING: 0
SHORTNESS OF BREATH: 0
SORE THROAT: 0
HEARTBURN: 0
EYE PAIN: 1
DOUBLE VISION: 0
NERVOUS/ANXIOUS: 0
DEPRESSION: 0
BLOOD IN STOOL: 0
ORTHOPNEA: 0
TREMORS: 0
NAUSEA: 0
WEIGHT LOSS: 0
MYALGIAS: 1
PALPITATIONS: 0
PHOTOPHOBIA: 0
PALPITATIONS: 1
DIZZINESS: 0
VOMITING: 0
POLYDIPSIA: 0
COUGH: 0
SINUS PAIN: 0
BRUISES/BLEEDS EASILY: 0
DIARRHEA: 0
TINGLING: 0
CONSTIPATION: 0
CHILLS: 0
EYE DISCHARGE: 0

## 2018-10-26 ASSESSMENT — PAIN SCALES - GENERAL: PAINLEVEL_OUTOF10: 3

## 2018-10-26 NOTE — PROGRESS NOTES
Internal Medicine Interval Note  Note Author: Bassam Rojas M.D.     Name Kristin Balderrama     1990   Age/Sex 28 y.o. female   MRN [id number]   Code Status full     After 5PM or if no immediate response to page, please call for cross-coverage  Attending/Team: Lyle/Sam See Patient List for primary contact information  Call [phone] to page    1st Call - Day Intern (R1):   Bob 2nd Call - Day Sr. Resident (R2/R3):   Darnell         Reason for interval visit  (Principal Problem)   Left breast cellulitis      Interval Problem Daily Status Update  (24 hours, problem oriented, brief subjective history, new lab/imaging data pertinent to that problem)   -27F, PMH of mitochondrial disorder with dropped foot, DM, Afib, chronic back pain s/p surgery, arthritis, hemiplegic migraines, asthma, TONYA, anxiety, depression, schizophrenia, presents with left breast cellulitis. Given IV ceftriaxone 1 dose and 2 doses of IV clindamycin, transitioned to oral clindamycin today.  -Patient continues to feel malaise, but left breast wounds show no active infection with normal levels of inflammatory markers, no leukocytosis, and normal vital signs since being admitted, US breast showing no abscess/mass  -discontinuing antibiotics and resuming prior medication regimen  -patient reports appointment with ID, Dr. Rios on Monday, to follow up at that time; will confirm appointment or schedule PCP follow up if unable to confirm  -patient citing some transient blurred vision in her left eye similar to possible optic neuritis presentation on admission in September and did complete a full course of steroid taper following admission, current inflammatory markers not supportive of a flare, patient scheduled for follow up with Dr. Yousif in 4 months      Review of Systems   Constitutional: Positive for malaise/fatigue. Negative for chills and fever.   HENT: Negative for ear pain, hearing loss, sinus pain and sore throat.   "  Eyes: Positive for blurred vision. Negative for double vision.        Vision changes in left eye similar to previous admission, patient reports outpatient follow up scheduled, no acute changes; patient clarified vision changes as transient blurry vision   Respiratory: Negative for cough, shortness of breath and wheezing.    Cardiovascular: Negative for chest pain and palpitations.   Gastrointestinal: Negative for abdominal pain, blood in stool, constipation, diarrhea, melena, nausea and vomiting.   Genitourinary: Negative for dysuria and hematuria.   Musculoskeletal: Positive for joint pain and myalgias.        Chronic myalgias, joint pain    Skin: Positive for itching and rash.        With left breast cellulitis   Neurological: Negative for dizziness and headaches.   Endo/Heme/Allergies: Negative for polydipsia. Does not bruise/bleed easily.   Psychiatric/Behavioral: Negative for depression. The patient is not nervous/anxious.         Medication managed       Disposition/Barriers to discharge:   Clinical improvement    Consultants/Specialty  none  PCP: Torres Brody M.D.      Quality Measures  Quality-Core Measures   Reviewed items::  Labs reviewed and Medications reviewed  Andrade catheter::  No Andrade  DVT prophylaxis pharmacological::  Enoxaparin (Lovenox)          Physical Exam       Vitals:    10/25/18 1935 10/25/18 2253 10/26/18 0355 10/26/18 0700   BP: 125/73 125/73 108/57 117/60   Pulse: 87 87 70 75   Resp: 18 18 18 18   Temp: 36.4 °C (97.5 °F) 36.4 °C (97.5 °F) 36.4 °C (97.6 °F) 36.3 °C (97.3 °F)   SpO2: 93% 93% 95% 92%   Weight:  123.5 kg (272 lb 4.3 oz)     Height:  1.651 m (5' 5\")       Body mass index is 45.31 kg/m². Weight: 123.5 kg (272 lb 4.3 oz)  Oxygen Therapy:  Pulse Oximetry: 92 %, O2 (LPM): 0, O2 Delivery: None (Room Air)    Physical Exam   Constitutional: She is oriented to person, place, and time and well-developed, well-nourished, and in no distress. No distress.   HENT:   Head: " Normocephalic and atraumatic.   Mouth/Throat: Oropharynx is clear and moist. No oropharyngeal exudate.   Eyes: Pupils are equal, round, and reactive to light. Conjunctivae and EOM are normal. Right eye exhibits no discharge. Left eye exhibits no discharge. No scleral icterus.   Neck: Normal range of motion. Neck supple. No tracheal deviation present.   Cardiovascular: Normal rate, regular rhythm, normal heart sounds and intact distal pulses.  Exam reveals no gallop and no friction rub.    No murmur heard.  Pulmonary/Chest: Effort normal and breath sounds normal. No respiratory distress. She has no wheezes. She has no rales. She exhibits no tenderness.   Abdominal: Soft. Bowel sounds are normal. She exhibits no distension and no mass. There is no tenderness. There is no rebound and no guarding.   Abdominal striae   Musculoskeletal: Normal range of motion. She exhibits no edema, tenderness or deformity.   Neurological: She is alert and oriented to person, place, and time. She exhibits normal muscle tone. Coordination normal.   Skin: Skin is warm and dry. Rash noted. She is not diaphoretic. There is erythema. No pallor.   diffusely tender left breast with 3-4x dry scabbing 1-3cm wounds with contraction around wound edge without purulent discharge/warmth but some limited surrounding erythema, no nipple discharge, no masses/lympadenopathy noted on bilateral breast exam    Psychiatric: Mood and affect normal.             Assessment/Plan     Cellulitis of left breast- (present on admission)   Assessment & Plan    -patient with left breast cellulitis, with prior admission s/p treatment with ceftriaxone with resolution, recurrence and refractory to 10 days of bactrim and topical clindamycin after recent course of prednisone  -ESR, CRP, ferritin, LDH for inflammatory markers wnl; no leukocytosis, afebrile and VSS  -US left breast, no abscesses/masses  -SIRS 0/4, lactic acid 2.2 downtrended to normal    -IV ceftriaxone and  clindamycin with plan to discontinue no signs of current infection  -IVF bolus, maintenance fluids  -blood cx x2, NGTD        TONYA (obstructive sleep apnea)- (present on admission)   Assessment & Plan    -patient on O2 at home until medicare stopped supplying  -will monitor for O2 need this admission        Chronic pain syndrome- (present on admission)   Assessment & Plan    -continue home dose baclofen            Peripheral neuropathy (CMS-HCC)- (present on admission)   Assessment & Plan    -continue home does pregabalin            Depression- (present on admission)   Assessment & Plan    -continue home dose trazadone            Psychosis, schizophrenia, simple (HCC)- (present on admission)   Assessment & Plan    -continue home does geodon        Anxiety- (present on admission)   Assessment & Plan    -continue home dose fluoxetine

## 2018-10-26 NOTE — PROGRESS NOTES
"Patient alert and oriented x 4. Verbalizes, \"I am frustrated not having my medication until now\". Called Pharmacy, spoke with [place] regarding complaint. Pt's medication verified and given. Safety precautions in placed. Bed on lowest position. Treaded socks on. Personal belongings within reach. Bed alarm on. Reinforced the use of call light when needing assistance.  "

## 2018-10-26 NOTE — DIETARY
NUTRITION SERVICES: Pt with morbid obesity as evidenced by BMI >40 (45.31). Weight loss counseling not appropriate in acute care setting. Recommend referral to outpatient nutrition services for weight management after D/C, if pt desires.

## 2018-10-26 NOTE — ASSESSMENT & PLAN NOTE
-patient with left breast cellulitis, with prior admission s/p treatment with ceftriaxone with resolution, recurrence and refractory to 10 days of bactrim and topical clindamycin after recent course of prednisone  -ESR, CRP, ferritin, LDH for inflammatory markers wnl; no leukocytosis, afebrile and VSS  -US left breast, no abscesses/masses  -SIRS 0/4, lactic acid 2.2 downtrended to normal    -IV ceftriaxone and clindamycin with plan to discontinue no signs of current infection  -IVF bolus, maintenance fluids  -blood cx x2, NGTD

## 2018-10-26 NOTE — PROGRESS NOTES
Pt given discharge instructions and verbalized understanding. Midline removed with no complications. Pt wheeled out by family. Pt A&Ox4 at time of DC. All questions answered. Patient's urine culture came back positive after pt had already left, MD paged.

## 2018-10-26 NOTE — PROGRESS NOTES
2 RN skin check done with Anya.  Pt had wound on her left breast and calloused right heel. Other areas of skin are intact and no open wounds noted

## 2018-10-26 NOTE — NON-PROVIDER
Internal Medicine Interval Note  Note Author: Ju Brownlee, Student     Name Kristin Balderrama     1990   Age/Sex 28 y.o. female   MRN [id number]   Code Status Full     After 5PM or if no immediate response to page, please call for cross-coverage  Attending/Team: Lyle Villagomez/Sam See Patient List for primary contact information  Call [phone] to page    1st Call - Day Intern (R1):   Bob 2nd Call - Day Sr. Resident (R2/R3):   Darnell         Reason for interval visit  (Principal Problem)   Worsening Left Breast Cellulitis    Interval Problem Daily Status Update  (24 hours)   The patient is doing fine. She is insistent on seeing infectious disease but has an appt with them on Monday. There were no significant elevations in temperature, HR, RR or WBC over night. Left breast wounds have not changed. Her grandmother does not want her to receive clindamycin so the switch was made to ceftriaxone last night. She doesn't report any increased pain or redness on the breast.     Review of Systems   Constitutional: Negative for chills, fever, malaise/fatigue and weight loss.   Eyes: Positive for blurred vision and pain. Negative for double vision, photophobia and discharge.   Respiratory: Negative for cough, shortness of breath and wheezing.    Cardiovascular: Positive for palpitations. Negative for chest pain, orthopnea and leg swelling.   Gastrointestinal: Negative for abdominal pain, constipation, diarrhea, heartburn, nausea and vomiting.   Genitourinary: Negative for dysuria, frequency, hematuria and urgency.   Skin: Negative for itching and rash.   Neurological: Negative for dizziness, tingling, tremors and headaches.   Psychiatric/Behavioral: Negative for depression.       Consultants/Specialty  PCP:Torres Brody    Disposition  Patient has no active infection. Will be discharged today with instructions to follow up with ID on Monday.     Quality Measures  Quality-Core Measures   Reviewed items::   "Labs reviewed and Medications reviewed  Andrade catheter::  No Andrade  DVT prophylaxis pharmacological::  Enoxaparin (Lovenox)  Ulcer Prophylaxis::  Not indicated  Antibiotics:  Treating active infection/contamination beyond 24 hours perioperative coverage          Physical Exam       Vitals:    10/25/18 1935 10/25/18 2253 10/26/18 0355 10/26/18 0700   BP: 125/73 125/73 108/57 117/60   Pulse: 87 87 70 75   Resp: 18 18 18 18   Temp: 36.4 °C (97.5 °F) 36.4 °C (97.5 °F) 36.4 °C (97.6 °F) 36.3 °C (97.3 °F)   SpO2: 93% 93% 95% 92%   Weight:  123.5 kg (272 lb 4.3 oz)     Height:  1.651 m (5' 5\")       Body mass index is 45.31 kg/m². Weight: 123.5 kg (272 lb 4.3 oz)  Oxygen Therapy:  Pulse Oximetry: 92 %, O2 (LPM): 0, O2 Delivery: None (Room Air)    Physical Exam  Constitutional: She is oriented to person, place, and time and well-developed, well-nourished, and in no distress.   HENT:   Head: Normocephalic and atraumatic.   Eyes: Pupils are equal, round, and reactive to light. Conjunctivae are normal.   Neck: Normal range of motion. Neck supple.   Cardiovascular: Normal rate, regular rhythm and normal heart sounds.  Exam reveals no gallop and no friction rub.    No murmur heard.  Pulmonary/Chest: Effort normal and breath sounds normal. She has no wheezes. She has no rales. She exhibits no tenderness. Right breast exhibits no inverted nipple, no mass, no nipple discharge, no skin change and no tenderness. Left breast exhibits skin change and tenderness. Left breast exhibits no inverted nipple, no mass and no nipple discharge. Breasts are symmetrical.   Left breast has 3 superficial wounds that are healing. There are no other skin changes. No lumps or masses are noted. No axillary lymphadenopathy. There are no changes to the breast compared to yesterday.  Abdominal: Soft. Bowel sounds are normal. She exhibits no distension. There is no tenderness.   Musculoskeletal: Normal range of motion.   Lymphadenopathy: She has no cervical " adenopathy.   Neurological: She is alert and oriented to person, place, and time. No cranial nerve deficit.   Skin: No rash noted. No erythema.   Psychiatric:   Pt is emotionally liable.     Lab Data Review:         10/26/2018  11:17 AM    Recent Labs      10/25/18   1108  10/26/18   0404   SODIUM  136  139   POTASSIUM  4.1  3.8   CHLORIDE  105  108   CO2  20  21   BUN  7*  8   CREATININE  0.78  0.82   CALCIUM  10.1  9.2       Recent Labs      10/25/18   1108  10/26/18   0404   ALTSGPT  74*  59*   ASTSGOT  35  27   ALKPHOSPHAT  60  52   TBILIRUBIN  0.7  0.6   GLUCOSE  104*  103*       Recent Labs      10/25/18   1108  10/25/18   1957  10/26/18   0404   RBC  4.64   --   4.13*   HEMOGLOBIN  14.3   --   12.7   HEMATOCRIT  41.5   --   37.6   PLATELETCT  211   --   175   PROTHROMBTM  13.2   --    --    APTT   --   28.2   --    INR  0.99   --    --    FERRITIN  124.6   --    --        Recent Labs      10/25/18   1108  10/26/18   0404   WBC  5.6  5.0   NEUTSPOLYS  57.50  46.20   LYMPHOCYTES  32.70  42.20*   MONOCYTES  6.80  8.80   EOSINOPHILS  1.40  1.20   BASOPHILS  1.10  1.20   ASTSGOT  35  27   ALTSGPT  74*  59*   ALKPHOSPHAT  60  52   TBILIRUBIN  0.7  0.6           Assessment/Plan:    #Left Breast Cellulitis- resolving  -Patient has extensive past history with this complaint  -No current signs of infection- afebrile, normal WBC, normal bpm, no tachypnea  -On PE there is no evidence of abscess or cellulitis. There are superficial healing wounds.   -US of L. breast shows no abscess   -Patient is very persistent on IV antibiotics. Given one dose of clindamycin and one of ceftriaxone  -There were no significant elevations in temperature, HR, RR or WBC over night. Left breast wounds have not changed  Plan:  -Discharge today with instructions to continue the bactrim until she sees infectious disease on Monday.   -Can continue acetaminophen at home for pain control     #Lactic acidosis (resolved)  -Baseline level for  her  Plan:  No action required     #Fatigue  -Pt reports sleeping 20 hours a day.   Plan:  Discharge with instructions to avoid melatonin if she is sleeping excessively.     #Type 2 DM with polyneuropathy  -Chronic problem  Plan:  -Hypoglycemic protocol  -DM diet  -Upon discharge- follow up with PCP     #Stable heart related chest pain  -This is a chronic issue. No current complaints of chest pain.  Plan:   -Continue home meds- ivabradine  -Upon discharge- follow up with PCP     #Mood disorder/Schizophrenia  -Past medical history  Plan:   -Continue home medications- ziprasidone, fluoxetine  -Upon discharge- follow up with PCP     #Muscle Spasms  -Chronic problem  Plan:   -Continue home meds- baclofen  -Upon discharge- follow up with PCP     No new Assessment & Plan notes have been filed under this hospital service since the last note was generated.  Service: Internal Medicine

## 2018-10-26 NOTE — DISCHARGE SUMMARY
Internal Medicine Discharge Summary  Note Author: Bassam Rojas M.D.       Name Kristin Balderrama     1990   Age/Sex 28 y.o. female   MRN [id number]         Admit Date:  10/25/2018       Discharge Date:   10/26/2108    Service:   [facility] Internal Medicine Gray Team  Attending Physician(s):   Lyle       Senior Resident(s):   Darnell  Edmond Resident(s):   Bob  PCP: Torres Brody M.D.      Primary Diagnosis:   Left breast cellulitis    Secondary Diagnoses:                Active Problems:    Cellulitis of left breast POA: Yes    Anxiety POA: Yes    Psychosis, schizophrenia, simple (HCC) (Chronic) POA: Yes    Depression POA: Yes    Peripheral neuropathy (CMS-HCC) (Chronic) POA: Yes    Chronic pain syndrome (Chronic) POA: Yes    TONYA (obstructive sleep apnea) POA: Yes    Asymptomatic bacteriuria POA: Unknown  Resolved Problems:    * No resolved hospital problems. *      Hospital Summary (Brief Narrative):       27F, PMH of mitochondrial disorder with dropped foot, DM, Afib, chronic back pain s/p surgery, arthritis, hemiplegic migraines, asthma, TONYA, anxiety, depression, schizophrenia, presents with left breast cellulitis. Given IV ceftriaxone 1 dose and 2 doses of IV clindamycin, transitioned to oral clindamycin on second day. A midline was placed due to difficulty placing peripheral IV. Patient showed normal levels of inflammatory markers (ESR, CRP, LDH, ferritin), no leukocytosis, was afebrile with stable vitals, and ultrasound of the left breast showed no abscess or masses. On second day of admission left breast wounds showed dry scabbing with minimal erythema without purulent discharge/warmth. Blood cultures showing no growth. Clinical picture not suggestive of active infection and antibiotics discontinued and home medication regimen restarted on discharge.  Patient reports a previously scheduled ID appointment on 10/29 with Dr. Rios, patient will follow up at that time.      Patient /Hospital  Summary (Details -- Problem Oriented) :          Cellulitis of left breast   Assessment & Plan    -patient with left breast cellulitis, with prior admission s/p treatment with ceftriaxone with resolution, recurrence and refractory to 10 days of bactrim and topical clindamycin after recent course of prednisone  -ESR, CRP, ferritin, LDH for inflammatory markers wnl; no leukocytosis, afebrile and VSS  -US left breast, no abscesses/masses  -SIRS 0/4, lactic acid 2.2 downtrended to normal    -IV ceftriaxone and clindamycin with plan to discontinue no signs of current infection  -IVF bolus, maintenance fluids  -blood cx x2, NGTD        Asymptomatic bacteriuria   Assessment & Plan    -Patient's urine culture grew Group D Enterococcus, but patient is asymptomatic  -no treatment necessary at this time, re-evaluate if symptomatic        TONYA (obstructive sleep apnea)   Assessment & Plan    -patient on O2 at home until medicare stopped supplying  -will monitor for O2 need this admission        Chronic pain syndrome   Assessment & Plan    -continue home dose baclofen            Peripheral neuropathy (CMS-HCC)   Assessment & Plan    -continue home does pregabalin            Depression   Assessment & Plan    -continue home dose trazadone            Psychosis, schizophrenia, simple (HCC)   Assessment & Plan    -continue home does geodon        Anxiety   Assessment & Plan    -continue home dose fluoxetine                Consultants:     none    Procedures:        none    Imaging/ Testing:      CXR  US-CHEST  IR-PICC LINE PLACEMENT    Discharge Medications:         Medication Reconciliation: Completed       Medication List      CONTINUE taking these medications      Instructions   aspirin EC 81 MG Tbec  Commonly known as:  ECOTRIN   Take 1 Tab by mouth every day.  Dose:  81 mg     baclofen 10 MG Tabs  Commonly known as:  LIORESAL   Take 10 mg by mouth 2 times a day.  Dose:  10 mg     busPIRone 10 MG Tabs tablet  Commonly known as:   BUSPAR   Take 0.5 Tabs by mouth 2 times a day.  Dose:  5 mg     cholestyramine 4 g packet  Commonly known as:  QUESTRAN   Take 1 Packet by mouth every day.  Dose:  1 Packet     cholestyramine 4 GM Pack  Commonly known as:  QUESTRAN,PREVALITE   Take 4 g by mouth every morning.  Dose:  4 g     [employer] VITAMIN B-12 500 MCG tablet  Generic drug:  cyanocobalamin   Take 500 mcg by mouth every day.  Dose:  500 mcg     FLUoxetine 10 MG Caps  Commonly known as:  PROZAC   TAKE ONE CAPSULE BY MOUTH ONCE A DAY     furosemide 40 MG Tabs  Commonly known as:  LASIX   Take 40 mg by mouth every day.  Dose:  40 mg     ivabradine 5 MG Tabs tablet  Commonly known as:  CORLANOR   Doctor's comments:  rx called  Take 1 Tab by mouth 2 times a day, with meals.  Dose:  5 mg     melatonin 3 MG Tabs   Take 6 mg by mouth every bedtime.  Dose:  6 mg     nystatin 272647 UNIT/GM Crea topical cream  Commonly known as:  MYCOSTATIN   Apply 0.0001 g to affected area(s) 2 times a day.  Dose:  1 Units     pregabalin 100 MG Caps  Commonly known as:  LYRICA   Doctor's comments:  Seen 9/28/18, gabapentin has failed.  This prescription is for three 30 day fills of 60 capsules daily.  Take 1 Cap by mouth 2 times a day for 90 days.  Dose:  100 mg     sodium bicarbonate 650 MG Tabs  Commonly known as:  SODIUM BICARBONATE   Take 650 mg by mouth 2 times a day.  Dose:  650 mg     traZODone 100 MG Tabs  Commonly known as:  DESYREL   Take 100 mg by mouth every bedtime.  Dose:  100 mg     triamcinolone acetonide 0.1 % Crea  Commonly known as:  KENALOG   Apply 1 g to affected area(s) 2 times a day. To left breast And LUE  Dose:  1 g     VICTOZA 18 MG/3ML Sopn injection  Generic drug:  liraglutide   Inject 1.8 mg as instructed every day.  Dose:  1.8 mg     vitamin D 1000 UNIT Tabs  Commonly known as:  cholecalciferol   Take 1,000 Units by mouth every day.  Dose:  1000 Units     ziprasidone 80 MG Caps  Commonly known as:  GEODON   Take 80 mg by mouth 2 Times a Day.  Dose:   80 mg            Can use .DISCHARGEMEDSLIST if going to another facility         Disposition:   home    Diet:   As tolerated    Activity:   As tolerated    Instructions:         The patient was instructed to return to the ER in the event of worsening symptoms. I have counseled the patient on the importance of compliance and the patient has agreed to proceed with all medical recommendations and follow up plan indicated above.   The patient understands that all medications come with benefits and risks. Risks may include permanent injury or death and these risks can be minimized with close reassessment and monitoring.        Primary Care Provider:    Torres Brody  Discharge summary faxed to primary care provider:  Deferred  Copy of discharge summary given to the patient: Deferred      Follow up appointment details :      Scheduled ID appointment on 10/29 with Dr. Rios; PCP if unable to confirm appointment    Pending Studies:        none    Time spent on discharge day patient visit, preparing discharge paperwork and arranging for patient follow up.    Summary of follow up issues:   Resolution of left breast wounds    Discharge Time (Minutes) :    50  Hospital Course Type: Observation Stay      Condition on Discharge    ______________________________________________________________________    Interval history/exam for day of discharge:    -Patient continues to feel malaise, but left breast wounds show no active infection with normal levels of inflammatory markers, no leukocytosis, and normal vital signs since being admitted, US breast showing no abscess/mass  -discontinuing antibiotics and resuming prior medication regimen  -patient reports appointment with ID, Dr. Rios on Monday, to follow up at that time; will confirm appointment or schedule PCP follow up if unable to confirm  -patient citing some transient blurred vision in her left eye similar to possible optic neuritis presentation on admission in September  and did complete a full course of steroid taper following admission, current inflammatory markers not supportive of a flare, patient scheduled for follow up with Dr. Yousif in 4 months    Physical Exam   Constitutional: She is oriented to person, place, and time and well-developed, well-nourished, and in no distress. No distress.   HENT:   Head: Normocephalic and atraumatic.   Mouth/Throat: Oropharynx is clear and moist. No oropharyngeal exudate.   Eyes: Pupils are equal, round, and reactive to light. Conjunctivae and EOM are normal. Right eye exhibits no discharge. Left eye exhibits no discharge. No scleral icterus.   Neck: Normal range of motion. Neck supple. No tracheal deviation present.   Cardiovascular: Normal rate, regular rhythm, normal heart sounds and intact distal pulses.  Exam reveals no gallop and no friction rub.    No murmur heard.  Pulmonary/Chest: Effort normal and breath sounds normal. No respiratory distress. She has no wheezes. She has no rales. She exhibits no tenderness.   Abdominal: Soft. Bowel sounds are normal. She exhibits no distension and no mass. There is no tenderness. There is no rebound and no guarding.   Abdominal striae   Musculoskeletal: Normal range of motion. She exhibits no edema, tenderness or deformity.   Neurological: She is alert and oriented to person, place, and time. She exhibits normal muscle tone. Coordination normal.   Skin: Skin is warm and dry. Rash noted. She is not diaphoretic. There is erythema. No pallor.   diffusely tender left breast with 3-4x dry scabbing 1-3cm wounds with contraction around wound edge without purulent discharge/warmth but some limited surrounding erythema, no nipple discharge, no masses/lympadenopathy noted on bilateral breast exam    Psychiatric: Mood and affect normal.         Most Recent Labs:    Lab Results   Component Value Date/Time    WBC 5.0 10/26/2018 04:04 AM    WBC 6.1 07/20/2010 11:00 AM    RBC 4.13 (L) 10/26/2018 04:04 AM     RBC 4.38 07/20/2010 11:00 AM    HEMOGLOBIN 12.7 10/26/2018 04:04 AM    HEMATOCRIT 37.6 10/26/2018 04:04 AM    MCV 91.0 10/26/2018 04:04 AM    MCV 93 07/20/2010 11:00 AM    MCH 30.8 10/26/2018 04:04 AM    MCH 30.1 07/20/2010 11:00 AM    MCHC 33.8 10/26/2018 04:04 AM    MPV 10.9 10/26/2018 04:04 AM    NEUTSPOLYS 46.20 10/26/2018 04:04 AM    LYMPHOCYTES 42.20 (H) 10/26/2018 04:04 AM    MONOCYTES 8.80 10/26/2018 04:04 AM    EOSINOPHILS 1.20 10/26/2018 04:04 AM    BASOPHILS 1.20 10/26/2018 04:04 AM      Lab Results   Component Value Date/Time    SODIUM 139 10/26/2018 04:04 AM    POTASSIUM 3.8 10/26/2018 04:04 AM    CHLORIDE 108 10/26/2018 04:04 AM    CO2 21 10/26/2018 04:04 AM    GLUCOSE 103 (H) 10/26/2018 04:04 AM    BUN 8 10/26/2018 04:04 AM    CREATININE 0.82 10/26/2018 04:04 AM    CREATININE 0.75 (L) 07/20/2010 11:00 AM    BUNCREATRAT 19 07/20/2010 11:00 AM    GLOMRATE >59 07/20/2010 11:00 AM      Lab Results   Component Value Date/Time    ALTSGPT 59 (H) 10/26/2018 04:04 AM    ASTSGOT 27 10/26/2018 04:04 AM    ALKPHOSPHAT 52 10/26/2018 04:04 AM    TBILIRUBIN 0.6 10/26/2018 04:04 AM    DBILIRUBIN <0.1 03/25/2013 01:50 PM    LIPASE 38 04/05/2017 09:43 AM    ALBUMIN 4.1 10/26/2018 04:04 AM    ALBUMIN 4.65 05/18/2016 04:49 PM    GLOBULIN 2.2 10/26/2018 04:04 AM    INR 0.99 10/25/2018 11:08 AM     Lab Results   Component Value Date/Time    PROTHROMBTM 13.2 10/25/2018 11:08 AM    INR 0.99 10/25/2018 11:08 AM

## 2018-10-26 NOTE — DISCHARGE PLANNING
Medical Social Work  PC to [facility], patient does have transport benefits.   Provided charge nurse number to [facility], who will call when transport will be here  Informed charge and patient's nurse

## 2018-10-26 NOTE — CARE PLAN
Problem: Infection  Goal: Will remain free from infection  Outcome: PROGRESSING AS EXPECTED  Pt will be discharged on oral antibiotics.    Problem: Discharge Barriers/Planning  Goal: Patient's continuum of care needs will be met  Outcome: PROGRESSING AS EXPECTED  Pt plan to discharge today.

## 2018-10-26 NOTE — ASSESSMENT & PLAN NOTE
-Patient's urine culture grew Group D Enterococcus, but patient is asymptomatic  -no treatment necessary at this time, re-evaluate if symptomatic

## 2018-10-26 NOTE — CARE PLAN
Problem: Safety  Goal: Will remain free from injury  Outcome: PROGRESSING AS EXPECTED  Bed alarm in use    Problem: Infection  Goal: Will remain free from infection  Outcome: PROGRESSING AS EXPECTED  Pt educated on the importance of infection prevention

## 2018-10-26 NOTE — DISCHARGE INSTRUCTIONS
Discharge Instructions    Discharged to home by car with relative. Discharged via wheelchair, hospital escort: Yes.  Special equipment needed: Not Applicable    Be sure to schedule a follow-up appointment with your primary care doctor or any specialists as instructed.     Discharge Plan:   Diet Plan: Discussed  Activity Level: Discussed  Confirmed Follow up Appointment: Appointment Scheduled  Confirmed Symptoms Management: Discussed  Medication Reconciliation Updated: Yes  Pneumococcal Vaccine Administered/Refused: Not given - Patient refused pneumococcal vaccine  Influenza Vaccine Indication: Not indicated: Previously immunized this influenza season and > 8 years of age    I understand that a diet low in cholesterol, fat, and sodium is recommended for good health. Unless I have been given specific instructions below for another diet, I accept this instruction as my diet prescription.   Other diet: regular    Special Instructions: None    · Is patient discharged on Warfarin / Coumadin?   No     Depression / Suicide Risk    As you are discharged from this [facility] facility, it is important to learn how to keep safe from harming yourself.    Recognize the warning signs:  · Abrupt changes in personality, positive or negative- including increase in energy   · Giving away possessions  · Change in eating patterns- significant weight changes-  positive or negative  · Change in sleeping patterns- unable to sleep or sleeping all the time   · Unwillingness or inability to communicate  · Depression  · Unusual sadness, discouragement and loneliness  · Talk of wanting to die  · Neglect of personal appearance   · Rebelliousness- reckless behavior  · Withdrawal from people/activities they love  · Confusion- inability to concentrate     If you or a loved one observes any of these behaviors or has concerns about self-harm, here's what you can do:  · Talk about it- your feelings and reasons for harming yourself  · Remove any  means that you might use to hurt yourself (examples: pills, rope, extension cords, firearm)  · Get professional help from the community (Mental Health, Substance Abuse, psychological counseling)  · Do not be alone:Call your Safe Contact- someone whom you trust who will be there for you.  · Call your local CRISIS HOTLINE 695-6275 or 778-422-0954  · Call your local Children's Mobile Crisis Response Team [place] (132) 507-4413 or www.[employer] (formerly [employer])  · Call the toll free National Suicide Prevention Hotlines   · National Suicide Prevention Lifeline 133-294-SDOR (4502)  · National Hope Line Network 800-SUICIDE (716-2698)

## 2018-10-26 NOTE — CARE PLAN
Problem: Safety  Goal: Will remain free from falls  Outcome: PROGRESSING AS EXPECTED  Reinforced the use of call light when needing assistance. Treaded socks on. Bed on lowest position. Personal belongings within reach. Clutter free environment provided.     Problem: Infection  Goal: Will remain free from infection  Outcome: PROGRESSING AS EXPECTED  The patient will remain free from infection. Reinforced the importance of hand washing

## 2018-10-29 ENCOUNTER — OFFICE VISIT (OUTPATIENT)
Dept: MEDICAL GROUP | Facility: MEDICAL CENTER | Age: 28
End: 2018-10-29
Payer: MEDICARE

## 2018-10-29 ENCOUNTER — OFFICE VISIT (OUTPATIENT)
Dept: INFECTIOUS DISEASES | Facility: MEDICAL CENTER | Age: 28
End: 2018-10-29
Payer: MEDICARE

## 2018-10-29 VITALS
RESPIRATION RATE: 16 BRPM | DIASTOLIC BLOOD PRESSURE: 72 MMHG | TEMPERATURE: 98.2 F | HEART RATE: 69 BPM | HEIGHT: 65 IN | WEIGHT: 267 LBS | OXYGEN SATURATION: 96 % | SYSTOLIC BLOOD PRESSURE: 118 MMHG | BODY MASS INDEX: 44.48 KG/M2

## 2018-10-29 VITALS
BODY MASS INDEX: 44.82 KG/M2 | TEMPERATURE: 98.4 F | HEART RATE: 86 BPM | WEIGHT: 269 LBS | DIASTOLIC BLOOD PRESSURE: 76 MMHG | HEIGHT: 65 IN | SYSTOLIC BLOOD PRESSURE: 120 MMHG | OXYGEN SATURATION: 97 %

## 2018-10-29 DIAGNOSIS — N30.00 ACUTE CYSTITIS WITHOUT HEMATURIA: ICD-10-CM

## 2018-10-29 DIAGNOSIS — E11.8 TYPE 2 DIABETES MELLITUS WITH COMPLICATION, UNSPECIFIED WHETHER LONG TERM INSULIN USE: ICD-10-CM

## 2018-10-29 DIAGNOSIS — E11.9 CONTROLLED TYPE 2 DIABETES MELLITUS WITHOUT COMPLICATION, WITHOUT LONG-TERM CURRENT USE OF INSULIN (HCC): ICD-10-CM

## 2018-10-29 DIAGNOSIS — N61.0 CELLULITIS OF LEFT BREAST: ICD-10-CM

## 2018-10-29 DIAGNOSIS — F20.89 PSYCHOSIS, SCHIZOPHRENIA, SIMPLE (HCC): Chronic | ICD-10-CM

## 2018-10-29 DIAGNOSIS — R31.9 URINARY TRACT INFECTION WITH HEMATURIA, SITE UNSPECIFIED: ICD-10-CM

## 2018-10-29 DIAGNOSIS — S21.002D WOUND OF LEFT BREAST, SUBSEQUENT ENCOUNTER: ICD-10-CM

## 2018-10-29 DIAGNOSIS — N39.0 URINARY TRACT INFECTION WITH HEMATURIA, SITE UNSPECIFIED: ICD-10-CM

## 2018-10-29 DIAGNOSIS — A49.1 ENTEROCOCCAL INFECTION: ICD-10-CM

## 2018-10-29 LAB
HBA1C MFR BLD: 5.7 % (ref ?–5.8)
INT CON NEG: NEGATIVE
INT CON POS: POSITIVE

## 2018-10-29 PROCEDURE — 99213 OFFICE O/P EST LOW 20 MIN: CPT | Performed by: NURSE PRACTITIONER

## 2018-10-29 PROCEDURE — 83036 HEMOGLOBIN GLYCOSYLATED A1C: CPT | Performed by: NURSE PRACTITIONER

## 2018-10-29 PROCEDURE — 99214 OFFICE O/P EST MOD 30 MIN: CPT | Performed by: INTERNAL MEDICINE

## 2018-10-29 RX ORDER — NITROFURANTOIN 25; 75 MG/1; MG/1
100 CAPSULE ORAL 2 TIMES DAILY
Qty: 20 CAP | Refills: 0 | Status: SHIPPED | OUTPATIENT
Start: 2018-10-29 | End: 2018-11-15

## 2018-10-29 NOTE — PROGRESS NOTES
"Chief Complaint   Patient presents with   • Follow-Up     Cellulitis of left breast       HISTORY OF PRESENT ILLNESS: Patient is a 27 y.o. female established patient who presents today for hosp f/u breast cellulitis  H/o obesity/psych disorders, DM, cellulitis of left breast  Wound cx - mixed skin letty. No fevers or leukocytosis in the hosp  Completed ceftriaxone (mult allergies/intolerances and concern for drug interaction with Zyvox) 08/12/18  Attributes all her breast wounds to \"spiders\"    States seen in ED 10/26 for one week h/o N/V, feeling lousy.  Given ceftriaxone and clinda then sent to fu here.  Breast USG neg-has new excoriated lesion left breast from \"rolling over\"  UA with crystals, few WBC, small LE Cx +Efaecium-not treated  Rodger feels terrible-has not seen PCP in \"a while\"  Patient Active Problem List    Diagnosis Date Noted   • Cellulitis of left breast 08/03/2018     Priority: High   • Left leg weakness 07/14/2018     Priority: High   • Sinus tachycardia 10/31/2013     Priority: High   • Chronic inflammatory arthritis 05/23/2016     Priority: Medium   • Morbid obesity with BMI of 45.0-49.9, adult ([facility]) 10/24/2017     Priority: Low   • Depression 10/28/2016     Priority: Low   • Schizophrenia ([facility]) 10/27/2016     Priority: Low   • Psychosis, schizophrenia, simple ([facility]) 09/29/2016     Priority: Low     Class: Chronic   • Acquired hypothyroidism 11/23/2015     Priority: Low   • PCOS (polycystic ovarian syndrome) 11/23/2015     Priority: Low   • Progressive focal motor weakness 06/28/2015     Priority: Low   • Fatty liver disease, nonalcoholic 01/19/2015     Priority: Low   • Anxiety 12/16/2014     Priority: Low   • Knee pain, right 02/13/2014     Priority: Low   • Neurogenic bladder 04/02/2011     Priority: Low   • Chronic UTI 09/18/2010     Priority: Low   • Borderline personality disorder in adult ([facility]) 09/18/2010     Priority: Low   • Asymptomatic bacteriuria 10/26/2018   • Palpitations 10/01/2018 "   • Chronic respiratory failure with hypoxia, on home oxygen therapy ([facility]) 08/08/2018   • Transaminitis 08/08/2018   • TONYA (obstructive sleep apnea) 01/09/2018   • Functional diarrhea 01/05/2018   • Breast wound 11/06/2017   • Intractable episodic cluster headache 09/14/2017   • Rash 06/09/2017   • Hashimoto's encephalopathy 05/17/2017   • Fall at home 05/13/2017   • On home oxygen therapy 04/15/2017   • Chest pain 03/30/2017   • Weakness of right upper extremity 02/23/2017   • Chronic suprapubic catheter ([facility]) 02/16/2017   • Hypovitaminosis D 11/29/2016   • Chronic pain syndrome 10/27/2016   • Bowel and bladder incontinence 10/27/2016   • Galactorrhea 07/22/2016   • Elevated sedimentation rate 06/27/2016   • Weakness of both lower extremities 06/22/2016   • Vitamin D deficiency 05/21/2016   • Morbidly obese ([facility]) 03/07/2016   • Scoliosis 03/07/2016   • GERD (gastroesophageal reflux disease) 03/07/2016   • Peripheral neuropathy (CMS-HCC) 03/06/2016   • H/O prior ablation treatment 02/10/2016       Allergies:Cefdinir; Depakote [divalproex sodium]; Doxycycline; Abilify; Amitriptyline; Amoxicillin; Ciprofloxacin; Clindamycin; Ees [erythromycin]; Flagyl [metronidazole hcl]; Flomax [tamsulosin hydrochloride]; Metformin; Sulfa drugs; Tape; Vancomycin; Wound dressing adhesive; Cephalexin [keflex]; Erythromycin; Levofloxacin; Metronidazole; and Valproic acid    Current Outpatient Prescriptions   Medication Sig Dispense Refill   • pregabalin (LYRICA) 100 MG Cap Take 1 Cap by mouth 2 times a day for 90 days. 60 Cap 2   • cholestyramine (QUESTRAN,PREVALITE) 4 GM Pack Take 4 g by mouth every morning.     • melatonin 3 MG Tab Take 6 mg by mouth every bedtime.     • traZODone (DESYREL) 100 MG Tab Take 100 mg by mouth every bedtime.     • vitamin D (CHOLECALCIFEROL) 1000 UNIT Tab Take 1,000 Units by mouth every day.     • triamcinolone acetonide (KENALOG) 0.1 % Cream Apply 1 g to affected area(s) 2 times a day. To left breast  And  CHAYOE     • nystatin (MYCOSTATIN) 760290 UNIT/GM Cream topical cream Apply 0.0001 g to affected area(s) 2 times a day. 1 Tube 3   • furosemide (LASIX) 40 MG Tab Take 40 mg by mouth every day.     • cyanocobalamin ([employer] VITAMIN B-12) 500 MCG tablet Take 500 mcg by mouth every day.     • busPIRone (BUSPAR) 10 MG Tab Take 0.5 Tabs by mouth 2 times a day. 60 Tab 2   • baclofen (LIORESAL) 10 MG Tab Take 10 mg by mouth 2 times a day.     • liraglutide (VICTOZA) 18 MG/3ML Solution Pen-injector injection Inject 1.8 mg as instructed every day.     • ziprasidone (GEODON) 80 MG Cap Take 80 mg by mouth 2 Times a Day.     • cholestyramine (QUESTRAN) 4 g packet Take 1 Packet by mouth every day.     • ivabradine (CORLANOR) 5 MG Tab tablet Take 1 Tab by mouth 2 times a day, with meals. 60 Tab 11   • sodium bicarbonate (SODIUM BICARBONATE) 650 MG Tab Take 650 mg by mouth 2 times a day.     • fluoxetine (PROZAC) 10 MG Cap TAKE ONE CAPSULE BY MOUTH ONCE A DAY 30 Cap 11   • aspirin EC (ECOTRIN) 81 MG Tablet Delayed Response Take 1 Tab by mouth every day. 30 Tab 6     No current facility-administered medications for this visit.        Social History   Substance Use Topics   • Smoking status: Never Smoker   • Smokeless tobacco: Never Used   • Alcohol use No       Family History   Problem Relation Age of Onset   • Hypertension Mother    • Sleep Apnea Mother    • Heart Disease Mother    • Other Mother         hypothryod   • Hypertension Maternal Uncle    • Heart Disease Maternal Grandmother    • Hypertension Maternal Grandmother    • No Known Problems Sister    • Other Sister         Narcolepsy;fibromyalsia;bone;nerve   • Genitourinary () Sister         endometriosis       ROS:  Review of Systems   Constitutional: Negative for fever, chills, weight loss and malaise/fatigue.   All other systems reviewed and are negative except as in HPI.      Exam:  Blood pressure 120/76, pulse 86, temperature 36.9 °C (98.4 °F), temperature source Temporal,  "height 1.651 m (5' 5\"), weight 122 kg (269 lb), SpO2 97 %, not currently breastfeeding.  General:  Well nourished, well developed female in NAD. Pleasant and cooperative Obese  Head: NCAT, Pupils are equal round reactive to light. Extraocular movements intact. Oropharynx is clear.  Neck: Supple.  No LAD  Pulmonary: Clear to ausculation.  No rales, rhonchi, or wheezing. Unlabored  Cardiovascular: Regular rate and rhythm   Abdominal: Soft, nontender, nondistended. Positive bowel sounds. No hepatosplenomegaly.  Skin: Multiple scars bilateral breasts. Left breast lesions-multiple shallow ulcerations-not resolved  Extremities: no clubbing, cyanosis, or edema. Brace RLE  Neurologic: Alert and oriented x4. Speech is fluent without dysarthria. Cranial nerves intact. Moving all extremities. Using Walker   Lab Results   Component Value Date/Time    WBC 5.0 10/26/2018 04:04 AM    WBC 6.1 07/20/2010 11:00 AM    RBC 4.13 (L) 10/26/2018 04:04 AM    RBC 4.38 07/20/2010 11:00 AM    HEMOGLOBIN 12.7 10/26/2018 04:04 AM    HEMATOCRIT 37.6 10/26/2018 04:04 AM    MCV 91.0 10/26/2018 04:04 AM    MCV 93 07/20/2010 11:00 AM    MCH 30.8 10/26/2018 04:04 AM    MCH 30.1 07/20/2010 11:00 AM    MCHC 33.8 10/26/2018 04:04 AM    MPV 10.9 10/26/2018 04:04 AM    NEUTSPOLYS 46.20 10/26/2018 04:04 AM    LYMPHOCYTES 42.20 (H) 10/26/2018 04:04 AM    MONOCYTES 8.80 10/26/2018 04:04 AM    EOSINOPHILS 1.20 10/26/2018 04:04 AM    BASOPHILS 1.20 10/26/2018 04:04 AM      Lab Results   Component Value Date/Time    SODIUM 139 10/26/2018 04:04 AM    POTASSIUM 3.8 10/26/2018 04:04 AM    CHLORIDE 108 10/26/2018 04:04 AM    CO2 21 10/26/2018 04:04 AM    GLUCOSE 103 (H) 10/26/2018 04:04 AM    BUN 8 10/26/2018 04:04 AM    CREATININE 0.82 10/26/2018 04:04 AM    CREATININE 0.75 (L) 07/20/2010 11:00 AM    BUNCREATRAT 19 07/20/2010 11:00 AM    GLOMRATE >59 07/20/2010 11:00 AM          Assessment/Plan:    1. Cellulitis of left breast   Cellulitis nearly resolved-has " multiple shallow open wounds left breast. Local care and prevention discussed  2.UTI-symptomatic with N/V, anorexia, subjective fever  Nitrofurantoin for 10 days  3. Type 2 diabetes mellitus without complication, without long-term current use of insulin (HCC)    Control BS to improve rate of healing  4. Psychosis, schizophrenia, simple (HCC)    Contributing to above    Ligia Rios M.D.

## 2018-10-29 NOTE — PROGRESS NOTES
"Subjective:      Kristin Balderrama is a 28 y.o. female who presents with Follow-Up (infections disease)        CC: Patient of Dr. Brody here same day visit for questions on diabetes and cystitis.    HPI Kristin Balderrama      1. Acute cystitis without hematuria  Patient had urine testing done a few days ago and it came back positive greater than 100,000 Enterococcus faecium.  It appears that a prescription for Macrobid was sent to her pharmacy but she was not aware of it.  It did show sensitivity to nitrofurantoin.  Currently is asymptomatic.  She is prone to UTIs.    2. Controlled type 2 diabetes mellitus without complication, without long-term current use of insulin ([facility])  Patient's last hemoglobin A1c was 6.8 in July.  She was appropriately stopped on Januvia and has only been taking Victoza daily.  She states she is allergic to metformin and if she needed Invokana in the future, she may be prone to even more UTIs.  Luckily, her hemoglobin A1c today comes back at 5.7.    3. Cellulitis of left breast  Patient has been following with infectious disease and she appears to be stable.  She reports that the skin tends to \"ripped off\" on this area on her left breast when she turns over in bed.  Social History   Substance Use Topics   • Smoking status: Never Smoker   • Smokeless tobacco: Never Used   • Alcohol use No     Current Outpatient Prescriptions   Medication Sig Dispense Refill   • nitrofurantoin monohydr macro (MACROBID) 100 MG Cap Take 1 Cap by mouth 2 times a day. 20 Cap 0   • pregabalin (LYRICA) 100 MG Cap Take 1 Cap by mouth 2 times a day for 90 days. 60 Cap 2   • cholestyramine (QUESTRAN,PREVALITE) 4 GM Pack Take 4 g by mouth every morning.     • melatonin 3 MG Tab Take 6 mg by mouth every bedtime.     • traZODone (DESYREL) 100 MG Tab Take 100 mg by mouth every bedtime.     • vitamin D (CHOLECALCIFEROL) 1000 UNIT Tab Take 1,000 Units by mouth every day.     • triamcinolone acetonide (KENALOG) 0.1 % " "Cream Apply 1 g to affected area(s) 2 times a day. To left breast  And LUE     • nystatin (MYCOSTATIN) 995487 UNIT/GM Cream topical cream Apply 0.0001 g to affected area(s) 2 times a day. 1 Tube 3   • furosemide (LASIX) 40 MG Tab Take 40 mg by mouth every day.     • cyanocobalamin ([employer] VITAMIN B-12) 500 MCG tablet Take 500 mcg by mouth every day.     • busPIRone (BUSPAR) 10 MG Tab Take 0.5 Tabs by mouth 2 times a day. 60 Tab 2   • baclofen (LIORESAL) 10 MG Tab Take 10 mg by mouth 2 times a day.     • liraglutide (VICTOZA) 18 MG/3ML Solution Pen-injector injection Inject 1.8 mg as instructed every day.     • ziprasidone (GEODON) 80 MG Cap Take 80 mg by mouth 2 Times a Day.     • cholestyramine (QUESTRAN) 4 g packet Take 1 Packet by mouth every day.     • ivabradine (CORLANOR) 5 MG Tab tablet Take 1 Tab by mouth 2 times a day, with meals. 60 Tab 11   • sodium bicarbonate (SODIUM BICARBONATE) 650 MG Tab Take 650 mg by mouth 2 times a day.     • fluoxetine (PROZAC) 10 MG Cap TAKE ONE CAPSULE BY MOUTH ONCE A DAY 30 Cap 11   • aspirin EC (ECOTRIN) 81 MG Tablet Delayed Response Take 1 Tab by mouth every day. 30 Tab 6     No current facility-administered medications for this visit.      Past Medical History:   Diagnosis Date   • Abdominal pain    • Anginal syndrome     random chest pain especially with tachycardia   • Apnea, sleep    • Arrhythmia     \"sinus tachycardia\", cariologist, Dr. Kumar; ablation 2/2016   • Arthritis     osteo   • ASTHMA     when around smoke   • Atrial fibrillation (HCC)    • Back pain    • Borderline personality disorder (HCC)    • Breath shortness     with tachycardia   • Cardiac arrhythmia    • Chickenpox    • Chronic UTI 9/18/2010   • Cough    • Daytime sleepiness    • Depression    • Diabetes (HCC)    • Diarrhea    • Disorder of thyroid    • Fall    • Fatigue    • Frequent headaches    • Gasping for breath    • Gynecological disorder     PCOS   • Headache(784.0)    • Heart burn    • History of " "falling    • Hypertension    • Migraine    • 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HCC)    • 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HCC)    • Nausea    • Obesity    • Pain 08-15-12    back, 7/10   • Painful joint    • PCOS (polycystic ovarian syndrome)    • Pneumonia 2012   • Psychosis (HCC)    • Renal disorder     \"kidney disease, stage 1\" nephrologist, Dr. 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   • Urinary bladder disorder     Suprapubic cath   • Urinary incontinence    • Weakness    • Wears glasses      Family History   Problem Relation Age of Onset   • Hypertension Mother    • Sleep Apnea Mother    • Heart Disease Mother    • Other Mother         hypothryod   • Hypertension Maternal Uncle    • Heart Disease Maternal Grandmother    • Hypertension Maternal Grandmother    • No Known Problems Sister    • Other Sister         Narcolepsy;fibromyalsia;bone;nerve   • Genitourinary () Sister         endometriosis       Review of Systems   Skin: Positive for rash.   All other systems reviewed and are negative.         Objective:     /72 (BP Location: Right arm, Patient Position: Sitting, BP Cuff Size: Adult)   Pulse 69   Temp 36.8 °C (98.2 °F)   Resp 16   Ht 1.651 m (5' 5\")   Wt 121.1 kg (267 lb)   SpO2 96%   BMI 44.43 kg/m²      Physical Exam   Constitutional: She is oriented to person, place, and time. She appears well-developed and well-nourished. No distress.   HENT:   Head: Normocephalic and atraumatic.   Right Ear: External ear normal.   Left Ear: External ear normal.   Nose: Nose normal.   Eyes: Right eye exhibits no discharge. Left eye exhibits no discharge.   Neck: Normal range of motion. Neck supple. No thyromegaly present.   Cardiovascular: Normal rate, regular rhythm and normal heart sounds.  " Exam reveals no gallop and no friction rub.    No murmur heard.  Pulmonary/Chest: Effort normal and breath sounds normal. She has no wheezes. She has no rales.   Musculoskeletal: She exhibits no edema or tenderness.   Neurological: She is alert and oriented to person, place, and time. She displays normal reflexes.   Skin: Skin is warm and dry. Rash noted. She is not diaphoretic.   Abraded area on left breast at 2 sites is now scabbed over and there is no discharge or fluctuance.   Psychiatric: She has a normal mood and affect. Her behavior is normal. Judgment and thought content normal.   Nursing note and vitals reviewed.              Assessment/Plan:     1. Acute cystitis without hematuria  Patient advised she needs to  the nitrofurantoin which was recently prescribed by infectious disease for her UTI and does show sensitivity to this.  She is not complaining of flank pain, fever or chills.    2. Controlled type 2 diabetes mellitus without complication, without long-term current use of insulin ([facility])  Hemoglobin A1c is looking well today at 5.7 so no change in diabetic medication was made and she was advised to continue trying to watch her carbohydrates which apparently has been working for her.  She will follow back in 3 months with Dr. Brody.  - POCT  A1C    3. Cellulitis of left breast  Patient advised not to touch or pick at the area since this should not keep recurring unless she is intervening.

## 2018-11-02 NOTE — WOUND TEAM
"Advanced Wound Care  [facility]  [address]  [address]  [place] [address]  [phone] Fax: [phone]      Encounter Note  For Certification Period: 10/27/17 - 1/17/18      Referring Physician: MD Jose  Primary Physician:        Consulting Physicians:     EDD Starr    Wound(s): L breast  Start of Care: 10/27/17       Subjective:        HPI: Current wound started 4-5 weeks ago. Another one has been ongoing for 6 months. Has tried antibiotic ointments, but they are not healing.  Pt suspects spider bites, but didn't actually see them.              Pain:  7/10          Allergies:   Allergies   Allergen Reactions   • Cefdinir Shortness of Breath and Itching     Tolerated 1/18/17   • Depakote [Divalproex Sodium] Unspecified     Muscle spasms/muscle pain and weakness     • Abilify Unspecified     Headaches/muscle twitching     • Amitriptyline Unspecified     Headaches     • Amoxicillin Rash     Pt states \"I get a rash\".     • Ciprofloxacin Rash     Pt states \"I get a rash\".     • Clindamycin Nausea     Even with food     • Ees [Erythromycin] Vomiting and Nausea   • Flagyl [Metronidazole Hcl] Unspecified     \"eye problems\"     • Flomax [Tamsulosin Hydrochloride] Swelling   • Metformin Unspecified     Increased lactic acid      • Sulfa Drugs Hives and Rash     RXN=since childhood   • Tape Rash     Tears skin off   coban with Tegaderm tape ok  RXN=ongoing   • Vancomycin Itching     Pt becomes flushed in face and chest.   RXN=7/10/16   • Cephalexin [Keflex] Rash     Pt states she gets a rash with this medication   • Erythromycin Rash     .   • Levofloxacin Unspecified     Leg muscle cramps   • Metronidazole Rash     .   • Valproic Acid Rash     .           Objective:      Tests and Measures: 10/27: Culture obtained L breast, negative as of 10/31    Orthotic, protective, supportive devices: R leg brace for foot drop    Fall Risk Assessment (vickie all that apply with an X): Completed " 01-Nov-2018 20:00 10/27/2017                 Wound Characteristics                                                    Location: L breast   Initial Evaluation  Date:10/27/17 Encounter Date: 10/31/2017   Tissue Type and %: Pink, viable 100% dark red   Periwound: intact Erythema    Drainage: Scant, ss Moderate serosanguinous   Exposed structures none none   Wound Edges:   open Open, flat   Odor: none mild   S&S of Infection:   none Erythema, increased drainage and odor   Edema: none none   Sensation: intact Intact, tender               Measurements:left breast Initial Evaluation  Date: 10/27/2017 Encounter Date: 10/31/2017   Length (cm) 1 0.8   Width (cm) 1.2 1.2   Depth (cm) 0.2 0.1   Area (cm2) 1.2 0.96cm2   Tract/undermine none none      Wound Characteristics                                                    Location: R breast   Initial Evaluation  Date:10/27/17 Encounter Date: 10/31/2017   Tissue Type and %: Scab 100% moist pink   Periwound: intact intact   Drainage: none Scant serosanguinous    Exposed structures none none   Wound Edges:   closed Open, flat   Odor: none none   S&S of Infection:   none none   Edema: none none   Sensation: intact intact               Measurements: right breast Initial Evaluation  Date: 10/27/2017 Encounter Date: 10/31/2017  Proximal / distal    Length (cm) 0.5 0.3 / 0.3   Width (cm) 1.7 0.3 / 0.3   Depth (cm) JODI 0.1 / 0.1   Area (cm2) 0.85 0.09 / 0.09cm2   Tract/undermine  None      Procedures:     Debridement :  Non-selective debridement with gauze and cotton tipped applicator.   Cleansed with: Normal saline and gauze                                                                   Periwound protected with: No sting skin prep   Primary dressing: Honey colloid   Secondary Dressing: silicone adhesive foams secured with tegaderm   Other:      Patient Education: Patient wound looks angry and inflamed, culture taken because of s/s of infection.  Patient agreeable to plan. Patient just started using  marijuana for pain management and has had good results.  Patient will be seeing her primary soon to speak to her regarding her oxygen needs.      Professional Collaboration: culture taken      Assessment:      Wound etiology: Question of spider bites.    Wound Progress:  Wound appear smaller and measurements reflect.     Rationale for Treatment: Debride, provide moist wound environment, manage bioburden.    Patient tolerance/compliance: Pt agrees with POC and tolerated treatment well.    Complicating factors: Diabetes, obesity    Need for ongoing Advanced Wound Care services: Pt requires wound monitoring, dressing selection and education.      Plan:      Treatment Plan and Recommendations:  Diagnosis/ICD9: L98.491 Non-healing skin ulcer  L64.9 - Skin lesion of breast    Procedures/CPT:    Frequency: 2X/week      Treatment Goals: STG 2 Weeks  LTG 4 Weeks   Granulation Tissue: 5% 10%   Decrease Necrotic Tissue to: % %   Wound Phase:      Decrease Size by: % %   Periwound:      Decrease tracts/undermining by: % %   Decrease Pain:          At the time of each visit a thorough assessment of the patient is completed to assure the  appropriateness of our plan of care.  The dressings or modalities may need to be adapted   from the original plan to address any significant changes in the wound environment.          Clinician Signature:_______________________________Date__________________      Physician Signature:______________________________Date:__________________

## 2018-11-05 ENCOUNTER — PATIENT OUTREACH (OUTPATIENT)
Dept: HEALTH INFORMATION MANAGEMENT | Facility: OTHER | Age: 28
End: 2018-11-05

## 2018-11-05 DIAGNOSIS — N39.0 CHRONIC UTI (URINARY TRACT INFECTION): ICD-10-CM

## 2018-11-05 DIAGNOSIS — E11.8 TYPE 2 DIABETES MELLITUS WITH COMPLICATION, UNSPECIFIED WHETHER LONG TERM INSULIN USE: ICD-10-CM

## 2018-11-05 DIAGNOSIS — G89.29 OTHER CHRONIC PAIN: ICD-10-CM

## 2018-11-06 ENCOUNTER — OFFICE VISIT (OUTPATIENT)
Dept: MEDICAL GROUP | Facility: MEDICAL CENTER | Age: 28
End: 2018-11-06
Payer: MEDICARE

## 2018-11-06 VITALS
SYSTOLIC BLOOD PRESSURE: 110 MMHG | BODY MASS INDEX: 45.65 KG/M2 | HEART RATE: 96 BPM | TEMPERATURE: 97.8 F | DIASTOLIC BLOOD PRESSURE: 74 MMHG | RESPIRATION RATE: 16 BRPM | HEIGHT: 65 IN | WEIGHT: 274 LBS | OXYGEN SATURATION: 93 %

## 2018-11-06 DIAGNOSIS — H57.89 EYE IRRITATION: ICD-10-CM

## 2018-11-06 DIAGNOSIS — N61.0 CELLULITIS OF LEFT BREAST: ICD-10-CM

## 2018-11-06 PROCEDURE — 99213 OFFICE O/P EST LOW 20 MIN: CPT | Performed by: INTERNAL MEDICINE

## 2018-11-06 NOTE — PROGRESS NOTES
Incoming call from Kristin regarding the [facility] program.  Kristin does wish to be enrolled in the program.  Referral placed.

## 2018-11-06 NOTE — PROGRESS NOTES
CC recurrent breast infection complaining of eye irritation    HPI:   Kristin presents today with the following.    1. Cellulitis of left breast  Presents after completing antibiotics for ulceration of the breast.  She did get treated with IV antibiotic for presumed cellulitis.  She did have a biopsy did not show any specific pathology.  She does have recurrent ulcerations however.  Do believe she may pick but she does not admit to that.  She still has some openings and sutures in place post biopsy.    2. Eye irritation  She is requesting referral to ophthalmology for what she calls swelling of her eyes does not elaborate.  No fevers or chills or changes to vision but she does report some irritation.      Patient Active Problem List    Diagnosis Date Noted   • Cellulitis of left breast 08/03/2018     Priority: High   • Left leg weakness 07/14/2018     Priority: High   • Controlled type 2 diabetes mellitus without complication, without long-term current use of insulin ([facility]) 04/26/2017     Priority: High   • Sinus tachycardia 10/31/2013     Priority: High   • Chronic inflammatory arthritis 05/23/2016     Priority: Medium   • Morbid obesity with BMI of 45.0-49.9, adult ([facility]) 10/24/2017     Priority: Low   • Depression 10/28/2016     Priority: Low   • Schizophrenia ([facility]) 10/27/2016     Priority: Low   • Psychosis, schizophrenia, simple ([facility]) 09/29/2016     Priority: Low     Class: Chronic   • Acquired hypothyroidism 11/23/2015     Priority: Low   • PCOS (polycystic ovarian syndrome) 11/23/2015     Priority: Low   • Progressive focal motor weakness 06/28/2015     Priority: Low   • Fatty liver disease, nonalcoholic 01/19/2015     Priority: Low   • Anxiety 12/16/2014     Priority: Low   • Knee pain, right 02/13/2014     Priority: Low   • Neurogenic bladder 04/02/2011     Priority: Low   • Chronic UTI 09/18/2010     Priority: Low   • Borderline personality disorder in adult ([facility]) 09/18/2010     Priority: Low   •  Asymptomatic bacteriuria 10/26/2018   • Palpitations 10/01/2018   • Chronic respiratory failure with hypoxia, on home oxygen therapy ([facility]) 08/08/2018   • Transaminitis 08/08/2018   • TONYA (obstructive sleep apnea) 01/09/2018   • Functional diarrhea 01/05/2018   • Breast wound 11/06/2017   • Intractable episodic cluster headache 09/14/2017   • Rash 06/09/2017   • Hashimoto's encephalopathy 05/17/2017   • Fall at home 05/13/2017   • On home oxygen therapy 04/15/2017   • Chest pain 03/30/2017   • Weakness of right upper extremity 02/23/2017   • Chronic suprapubic catheter ([facility]) 02/16/2017   • Hypovitaminosis D 11/29/2016   • Chronic pain syndrome 10/27/2016   • Bowel and bladder incontinence 10/27/2016   • Galactorrhea 07/22/2016   • Elevated sedimentation rate 06/27/2016   • Weakness of both lower extremities 06/22/2016   • Vitamin D deficiency 05/21/2016   • Morbidly obese ([facility]) 03/07/2016   • Scoliosis 03/07/2016   • GERD (gastroesophageal reflux disease) 03/07/2016   • Peripheral neuropathy ([facility]) 03/06/2016   • H/O prior ablation treatment 02/10/2016       Current Outpatient Prescriptions   Medication Sig Dispense Refill   • nitrofurantoin monohydr macro (MACROBID) 100 MG Cap Take 1 Cap by mouth 2 times a day. 20 Cap 0   • pregabalin (LYRICA) 100 MG Cap Take 1 Cap by mouth 2 times a day for 90 days. 60 Cap 2   • cholestyramine (QUESTRAN,PREVALITE) 4 GM Pack Take 4 g by mouth every morning.     • melatonin 3 MG Tab Take 6 mg by mouth every bedtime.     • traZODone (DESYREL) 100 MG Tab Take 100 mg by mouth every bedtime.     • vitamin D (CHOLECALCIFEROL) 1000 UNIT Tab Take 1,000 Units by mouth every day.     • triamcinolone acetonide (KENALOG) 0.1 % Cream Apply 1 g to affected area(s) 2 times a day. To left breast  And LUE     • nystatin (MYCOSTATIN) 255598 UNIT/GM Cream topical cream Apply 0.0001 g to affected area(s) 2 times a day. 1 Tube 3   • furosemide (LASIX) 40 MG Tab Take 40 mg by mouth every day.     •  cyanocobalamin ([employer] VITAMIN B-12) 500 MCG tablet Take 500 mcg by mouth every day.     • busPIRone (BUSPAR) 10 MG Tab Take 0.5 Tabs by mouth 2 times a day. 60 Tab 2   • baclofen (LIORESAL) 10 MG Tab Take 10 mg by mouth 2 times a day.     • liraglutide (VICTOZA) 18 MG/3ML Solution Pen-injector injection Inject 1.8 mg as instructed every day.     • ziprasidone (GEODON) 80 MG Cap Take 80 mg by mouth 2 Times a Day.     • cholestyramine (QUESTRAN) 4 g packet Take 1 Packet by mouth every day.     • ivabradine (CORLANOR) 5 MG Tab tablet Take 1 Tab by mouth 2 times a day, with meals. 60 Tab 11   • sodium bicarbonate (SODIUM BICARBONATE) 650 MG Tab Take 650 mg by mouth 2 times a day.     • fluoxetine (PROZAC) 10 MG Cap TAKE ONE CAPSULE BY MOUTH ONCE A DAY 30 Cap 11   • aspirin EC (ECOTRIN) 81 MG Tablet Delayed Response Take 1 Tab by mouth every day. 30 Tab 6     No current facility-administered medications for this visit.          Allergies as of 11/06/2018 - Reviewed 10/29/2018   Allergen Reaction Noted   • Cefdinir Shortness of Breath and Itching 03/01/2016   • Depakote [divalproex sodium] Unspecified 06/14/2010   • Doxycycline Anaphylaxis and Vomiting 08/15/2012   • Abilify Unspecified 01/17/2013   • Amitriptyline Unspecified 10/31/2013   • Amoxicillin Rash 09/18/2010   • Ciprofloxacin Rash 12/17/2009   • Clindamycin Nausea 02/02/2011   • Ees [erythromycin] Vomiting and Nausea 08/28/2010   • Flagyl [metronidazole hcl] Unspecified 03/31/2011   • Flomax [tamsulosin hydrochloride] Swelling 09/24/2009   • Metformin Unspecified 07/23/2013   • Sulfa drugs Hives and Rash 09/18/2010   • Tape Rash 08/15/2012   • Vancomycin Itching 07/10/2016   • Wound dressing adhesive Hives 01/12/2018   • Cephalexin [keflex] Rash 01/01/2017   • Erythromycin Rash 03/30/2017   • Levofloxacin Unspecified 10/27/2016   • Metronidazole Rash 03/30/2017   • Valproic acid Rash 03/30/2017        ROS: Denies Chest pain, SOB, LE edema.    /74   Pulse  "96   Temp 36.6 °C (97.8 °F)   Resp 16   Ht 1.65 m (5' 4.96\")   Wt 124.3 kg (274 lb)   SpO2 93%   BMI 45.65 kg/m²     Physical Exam:  Gen:         Alert and oriented, No apparent distress.  Neck:        No Lymphadenopathy or Bruits.  Lungs:     Clear to auscultation bilaterally  CV:          Regular rate and rhythm. No murmurs, rubs or gallops.               Ext:          No clubbing, cyanosis, edema.      Assessment and Plan.   28 y.o. female with the following issues.    1. Cellulitis of left breast  Biopsy did not show any specific pathology to change treatment course have referred to wound care so if she gets ulcers that can be managed closely.  - REFERRAL TO WOUND CLINIC    2. Eye irritation  Referring to ophthalmology per her request.  - REFERRAL TO OPHTHALMOLOGY      "

## 2018-11-09 ENCOUNTER — PATIENT OUTREACH (OUTPATIENT)
Dept: HEALTH INFORMATION MANAGEMENT | Facility: OTHER | Age: 28
End: 2018-11-09

## 2018-11-09 DIAGNOSIS — Z79.899 MEDICATION MANAGEMENT: Primary | ICD-10-CM

## 2018-11-09 DIAGNOSIS — Z74.8 ASSISTANCE WITH TRANSPORTATION: ICD-10-CM

## 2018-11-09 ASSESSMENT — PATIENT HEALTH QUESTIONNAIRE - PHQ9: CLINICAL INTERPRETATION OF PHQ2 SCORE: 0

## 2018-11-09 NOTE — PROGRESS NOTES
Referral received: Medicare ACO, self referral. Needs CC services for care coordination. Pt called and wants to be enrolled in the program.    Initial outgoing call for outreach to introduce self and CCM Program. Left voice message with contact information to return call.

## 2018-11-09 NOTE — LETTER
November 14, 2018        Kristin Balderrama  [address] [place] [address]        Dear Kristin:    Welcome to [facility] Community Care Management! Your team of Registered Nurses, Social Workers, and Pharmacists are partnered with your [facility] Providers to assist you with accessing resources and education to support your individual needs. As you work with your Community Care Management Team, you will be empowered to be successful with learning how to self-manage your health with the Patient-Centered goals of helping you to feel better and staying out of the hospital. This program is at no cost to you as this is a part of [facility]’s ongoing commitment to serve the people of our community.    Benefits of working with your Community Care Management Team includes:    - Comprehensive assessment by a Registered Nurse on the telephone to identify your medical and health needs.   - Telephonic review of your medications by a Care Management Pharmacist to answer any questions you may have about your medications.  - Evaluation of social needs, such as, transportation; financial; food; housing; etc. by a Care Management  to connect you with eligible resources.    Please contact us at [phone] to schedule an introductory call with your Registered Nurse. We are available 5 days a week, Monday through Friday from 8:00 a.m. - 5:00 p.m.      If you have any questions or concerns, please don't hesitate to call.        Sincerely,        Fatoumata Ramsey R.N. Care Coordinator  [phone]    Electronically Signed

## 2018-11-09 NOTE — PROGRESS NOTES
"Late entry for 11/6/2018 @ 1518 Incoming call from patient returning outreach call.     Patient reports living with 82 year old grandma, disabled uncle and an aunt. Her grandma is her primary caretaker. She states she has tried to get assistance in the home to relieve her grandma in the past but her grandma had declined at that time. Patient reports difficulty maintaining diabetic diet as she does not assist with shopping or cooking. Patient reports weight fluctuating between 264 ad 274.     Patient c/o \"Lots of eye pain.\" She has a call out to the opthalmologist and will follow up next week if no response back. She states she also feels weak and is concerned about falling in the home. She states she has had multiple falls this year. She has a walker, wc and 4 point cane to assist with mobilization. Patient states she doesn't feel very steady with 4 point cane and her walker and wc do not fit through the doorways in her home. Patient reports using furniture to steady herself while ambulating in the home. Patient c/o pain in her legs that she is \"trying to walk out;\" has resolved some. Patient states she ingests marijuana brownies for pain. Patient states she doesn't take anything else for pain. Educated patient to avoid taking any other sedating medications while ingesting marijuana brownies.     Patient states she has completed her course of antibiotics for UTI--review s/s of UTI and encouraged patient to notify PCP if any symptoms remain or worsen.     Patient stated goals/priorities:  \"Getting my body back to normal\" (reports n/v from recent antibiotics; reports flushing and nightmares from oral steroid use)  \"Get eyes working again\"  Bladder function return to baseline (recent antibiotics for UTI)    Plan:  -[facility] Referrals to Pharmacist and SW for assessment  -Food as Medicine prescription for carb controlled foods to increase access to DM appropriate foods.   -Reinforce education from previous " agencies/departments regarding DM diet, fall prevention, pain management.   -follow up in 2 weeks.

## 2018-11-10 NOTE — PATIENT INSTRUCTIONS
Fall Prevention in the Home  Falls can cause injuries and can affect people from all age groups. There are many simple things that you can do to make your home safe and to help prevent falls.  What can I do on the outside of my home?  · Regularly repair the edges of walkways and driveways and fix any cracks.  · Remove high doorway thresholds.  · Trim any shrubbery on the main path into your home.  · Use bright outdoor lighting.  · Clear walkways of debris and clutter, including tools and rocks.  · Regularly check that handrails are securely fastened and in good repair. Both sides of any steps should have handrails.  · Install guardrails along the edges of any raised decks or porches.  · Have leaves, snow, and ice cleared regularly.  · Use sand or salt on walkways during winter months.  · In the garage, clean up any spills right away, including grease or oil spills.  What can I do in the bathroom?  · Use night lights.  · Install grab bars by the toilet and in the tub and shower. Do not use towel bars as grab bars.  · Use non-skid mats or decals on the floor of the tub or shower.  · If you need to sit down while you are in the shower, use a plastic, non-slip stool.  · Keep the floor dry. Immediately clean up any water that spills on the floor.  · Remove soap buildup in the tub or shower on a regular basis.  · Attach bath mats securely with double-sided non-slip rug tape.  · Remove throw rugs and other tripping hazards from the floor.  What can I do in the bedroom?  · Use night lights.  · Make sure that a bedside light is easy to reach.  · Do not use oversized bedding that drapes onto the floor.  · Have a firm chair that has side arms to use for getting dressed.  · Remove throw rugs and other tripping hazards from the floor.  What can I do in the kitchen?  · Clean up any spills right away.  · Avoid walking on wet floors.  · Place frequently used items in easy-to-reach places.  · If you need to reach for something above  you, use a sturdy step stool that has a grab bar.  · Keep electrical cables out of the way.  · Do not use floor polish or wax that makes floors slippery. If you have to use wax, make sure that it is non-skid floor wax.  · Remove throw rugs and other tripping hazards from the floor.  What can I do in the stairways?  · Do not leave any items on the stairs.  · Make sure that there are handrails on both sides of the stairs. Fix handrails that are broken or loose. Make sure that handrails are as long as the stairways.  · Check any carpeting to make sure that it is firmly attached to the stairs. Fix any carpet that is loose or worn.  · Avoid having throw rugs at the top or bottom of stairways, or secure the rugs with carpet tape to prevent them from moving.  · Make sure that you have a light switch at the top of the stairs and the bottom of the stairs. If you do not have them, have them installed.  What are some other fall prevention tips?  · Wear closed-toe shoes that fit well and support your feet. Wear shoes that have rubber soles or low heels.  · When you use a stepladder, make sure that it is completely opened and that the sides are firmly locked. Have someone hold the ladder while you are using it. Do not climb a closed stepladder.  · Add color or contrast paint or tape to grab bars and handrails in your home. Place contrasting color strips on the first and last steps.  · Use mobility aids as needed, such as canes, walkers, scooters, and crutches.  · Turn on lights if it is dark. Replace any light bulbs that burn out.  · Set up furniture so that there are clear paths. Keep the furniture in the same spot.  · Fix any uneven floor surfaces.  · Choose a carpet design that does not hide the edge of steps of a stairway.  · Be aware of any and all pets.  · Review your medicines with your healthcare provider. Some medicines can cause dizziness or changes in blood pressure, which increase your risk of falling.  Talk with  your health care provider about other ways that you can decrease your risk of falls. This may include working with a physical therapist or  to improve your strength, balance, and endurance.  This information is not intended to replace advice given to you by your health care provider. Make sure you discuss any questions you have with your health care provider.  Document Released: 12/08/2003 Document Revised: 05/16/2017 Document Reviewed: 01/22/2016  [employer] Interactive Patient Education © 2017 [employer]      Urinary Tract Infection, Adult  A urinary tract infection (UTI) is an infection of any part of the urinary tract, which includes the kidneys, ureters, bladder, and urethra. These organs make, store, and get rid of urine in the body. UTI can be a bladder infection (cystitis) or kidney infection (pyelonephritis).  What are the causes?  This infection may be caused by fungi, viruses, or bacteria. Bacteria are the most common cause of UTIs. This condition can also be caused by repeated incomplete emptying of the bladder during urination.  What increases the risk?  This condition is more likely to develop if:  · You ignore your need to urinate or hold urine for long periods of time.  · You do not empty your bladder completely during urination.  · You wipe back to front after urinating or having a bowel movement, if you are female.  · You are uncircumcised, if you are male.  · You are constipated.  · You have a urinary catheter that stays in place (indwelling).  · You have a weak defense (immune) system.  · You have a medical condition that affects your bowels, kidneys, or bladder.  · You have diabetes.  · You take antibiotic medicines frequently or for long periods of time, and the antibiotics no longer work well against certain types of infections (antibiotic resistance).  · You take medicines that irritate your urinary tract.  · You are exposed to chemicals that irritate your urinary tract.  · You are  female.  What are the signs or symptoms?  Symptoms of this condition include:  · Fever.  · Frequent urination or passing small amounts of urine frequently.  · Needing to urinate urgently.  · Pain or burning with urination.  · Urine that smells bad or unusual.  · Cloudy urine.  · Pain in the lower abdomen or back.  · Trouble urinating.  · Blood in the urine.  · Vomiting or being less hungry than normal.  · Diarrhea or abdominal pain.  · Vaginal discharge, if you are female.  How is this diagnosed?  This condition is diagnosed with a medical history and physical exam. You will also need to provide a urine sample to test your urine. Other tests may be done, including:  · Blood tests.  · Sexually transmitted disease (STD) testing.  If you have had more than one UTI, a cystoscopy or imaging studies may be done to determine the cause of the infections.  How is this treated?  Treatment for this condition often includes a combination of two or more of the following:  · Antibiotic medicine.  · Other medicines to treat less common causes of UTI.  · Over-the-counter medicines to treat pain.  · Drinking enough water to stay hydrated.  Follow these instructions at home:  · Take over-the-counter and prescription medicines only as told by your health care provider.  · If you were prescribed an antibiotic, take it as told by your health care provider. Do not stop taking the antibiotic even if you start to feel better.  · Avoid alcohol, caffeine, tea, and carbonated beverages. They can irritate your bladder.  · Drink enough fluid to keep your urine clear or pale yellow.  · Keep all follow-up visits as told by your health care provider. This is important.  · Make sure to:  ¨ Empty your bladder often and completely. Do not hold urine for long periods of time.  ¨ Empty your bladder before and after sex.  ¨ Wipe from front to back after a bowel movement if you are female. Use each tissue one time when you wipe.  Contact a health care  provider if:  · You have back pain.  · You have a fever.  · You feel nauseous or vomit.  · Your symptoms do not get better after 3 days.  · Your symptoms go away and then return.  Get help right away if:  · You have severe back pain or lower abdominal pain.  · You are vomiting and cannot keep down any medicines or water.  This information is not intended to replace advice given to you by your health care provider. Make sure you discuss any questions you have with your health care provider.  Document Released: 09/27/2006 Document Revised: 05/31/2017 Document Reviewed: 11/07/2016  [employer] Interactive Patient Education © 2017 [employer]

## 2018-11-11 RX ORDER — BACLOFEN 10 MG/1
TABLET ORAL
Qty: 90 TAB | Refills: 4 | Status: SHIPPED | OUTPATIENT
Start: 2018-11-11 | End: 2019-05-27

## 2018-11-13 ENCOUNTER — OFFICE VISIT (OUTPATIENT)
Dept: WOUND CARE | Facility: MEDICAL CENTER | Age: 28
End: 2018-11-13
Attending: INTERNAL MEDICINE
Payer: MEDICARE

## 2018-11-13 VITALS
DIASTOLIC BLOOD PRESSURE: 88 MMHG | SYSTOLIC BLOOD PRESSURE: 154 MMHG | OXYGEN SATURATION: 94 % | HEART RATE: 75 BPM | TEMPERATURE: 97.9 F | RESPIRATION RATE: 20 BRPM

## 2018-11-13 DIAGNOSIS — E66.01 MORBIDLY OBESE (HCC): ICD-10-CM

## 2018-11-13 DIAGNOSIS — E11.9 CONTROLLED TYPE 2 DIABETES MELLITUS WITHOUT COMPLICATION, WITHOUT LONG-TERM CURRENT USE OF INSULIN (HCC): ICD-10-CM

## 2018-11-13 DIAGNOSIS — S21.002D WOUND OF LEFT BREAST, SUBSEQUENT ENCOUNTER: Primary | ICD-10-CM

## 2018-11-13 PROCEDURE — 99213 OFFICE O/P EST LOW 20 MIN: CPT

## 2018-11-13 PROCEDURE — 99203 OFFICE O/P NEW LOW 30 MIN: CPT

## 2018-11-13 PROCEDURE — 99213 OFFICE O/P EST LOW 20 MIN: CPT | Performed by: NURSE PRACTITIONER

## 2018-11-13 RX ORDER — ACYCLOVIR 200 MG/1
400 CAPSULE ORAL
COMMUNITY
End: 2018-11-15

## 2018-11-13 ASSESSMENT — ENCOUNTER SYMPTOMS
DEPRESSION: 0
CHILLS: 0
NAUSEA: 1
DIZZINESS: 0
VOMITING: 1
FEVER: 0
COUGH: 0
SHORTNESS OF BREATH: 0

## 2018-11-13 NOTE — PROGRESS NOTES
"Provider Encounter- Full Thickness wound      Patient seen in collaboration with wound care clinician,  Delicia Gao RN     HISTORY OF PRESENT ILLNESS:     START OF CARE IN CLINIC: 11/13/18     WOUND- Full thickness wounds   LOCATION: Left breast   WOUND HISTORY: Patient states she has had wounds to this breast, and occasionally to her right breast, off and on for the past 10 years.  States, \"they just pop up\".  She denies itching, or any type of picking behaviors.    PERTINENT PMH:    CURRENT TREATMENT: Morbid obesity, controlled type II diabetes, psychological disorders   LAST  WOUND CULTURE:  DATE : No current wound cultures in epic              CURRENT ABX: She is on Macrobid for UTI   MOST RECENT IMAGING: N/A       DIABETIC: Yes  Last blood glucose per pt : 150s  Most recent A1c: 5.7 DATE 10/29/18    11/13: Patient presents today for initial evaluation.  Wounds are open to air and are dry.  She is currently receiving treatment for shingles to her right breast.  She is also on Macrobid for UTI.      PAST MEDICAL HISTORY:   Past Medical History:   Diagnosis Date   • Abdominal pain    • Anginal syndrome     random chest pain especially with tachycardia   • Apnea, sleep    • Arrhythmia     \"sinus tachycardia\", cariologist, Dr. Kumar; ablation 2/2016   • Arthritis     osteo   • ASTHMA     when around smoke   • Atrial fibrillation (HCC)    • Back pain    • Borderline personality disorder (HCC)    • Breath shortness     with tachycardia   • Cardiac arrhythmia    • Chickenpox    • Chronic UTI 9/18/2010   • Cough    • Daytime sleepiness    • Depression    • Diabetes (HCC)    • Diarrhea    • Disorder of thyroid    • Fall    • Fatigue    • Frequent headaches    • Gasping for breath    • Gynecological disorder     PCOS   • Headache(784.0)    • Heart burn    • History of falling    • Hypertension    • Migraine    • 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HCC)    • 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HCC)    • Nausea    • Obesity    • Pain " "08-15-12    back, 7/10   • Painful joint    • PCOS (polycystic ovarian syndrome)    • Pneumonia 2012   • Psychosis (HCC)    • Renal disorder     \"kidney disease, stage 1\" nephrologist, Dr. 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   • Urinary bladder disorder     Suprapubic cath   • Urinary incontinence    • Weakness    • Wears glasses        PAST SURGICAL HISTORY:   Past Surgical History:   Procedure Laterality Date   • MUSCLE BIOPSY Right 1/26/2017    Procedure: MUSCLE BIOPSY - THIGH;  Surgeon: Isidro Vigil M.D.;  Location: [facility];  Service:    • GASTROSCOPY WITH BALLOON DILATATION N/A 1/4/2017    Procedure: GASTROSCOPY WITH DILATATION;  Surgeon: Torres Vargas M.D.;  Location: [facility];  Service:    • BOWEL STIMULATOR INSERTION  7/13/2016    Procedure: BOWEL STIMULATOR INSERTION FOR PERMANENT INTERSTIM SACRAL IMPLANT;  Surgeon: Joe Noyola M.D.;  Location: SURGERY [facility];  Service:    • RECOVERY  1/27/2016    Procedure: CATH LAB EP STUDY WITH SINUS NODE MODIFICATION LA;  Surgeon: Recoveryonly Surgery;  Location: SURGERY [facility] [place] [facility];  Service:    • OTHER CARDIAC SURGERY  1/2016    cardiac ablation   • NEURO DEST FACET L/S W/IG SNGL  4/21/2015    Performed by Reza Tabor at [facility]   • LUMBAR FUSION ANTERIOR  8/21/2012    Performed by SUSIE SAWANT at [facility]   • ALYSSA BY LAPAROSCOPY  8/29/2010    Performed by SHAYY JOHNS at [facility]   • LAMINOTOMY     • OTHER ABDOMINAL SURGERY     • TONSILLECTOMY      tonsillectomy        MEDICATIONS:   Current Outpatient Prescriptions   Medication   • baclofen (LIORESAL) 10 MG Tab   • nitrofurantoin monohydr macro (MACROBID) 100 MG Cap   • " "pregabalin (LYRICA) 100 MG Cap   • cholestyramine (QUESTRAN,PREVALITE) 4 GM Pack   • melatonin 3 MG Tab   • traZODone (DESYREL) 100 MG Tab   • vitamin D (CHOLECALCIFEROL) 1000 UNIT Tab   • triamcinolone acetonide (KENALOG) 0.1 % Cream   • nystatin (MYCOSTATIN) 790068 UNIT/GM Cream topical cream   • furosemide (LASIX) 40 MG Tab   • cyanocobalamin (CVS VITAMIN B-12) 500 MCG tablet   • busPIRone (BUSPAR) 10 MG Tab   • liraglutide (VICTOZA) 18 MG/3ML Solution Pen-injector injection   • ziprasidone (GEODON) 80 MG Cap   • cholestyramine (QUESTRAN) 4 g packet   • ivabradine (CORLANOR) 5 MG Tab tablet   • sodium bicarbonate (SODIUM BICARBONATE) 650 MG Tab   • fluoxetine (PROZAC) 10 MG Cap   • aspirin EC (ECOTRIN) 81 MG Tablet Delayed Response     No current facility-administered medications for this visit.        ALLERGIES:    Allergies   Allergen Reactions   • Cefdinir Shortness of Breath and Itching     Tolerated 1/18/17  Tolerates ceftriaxone    • Depakote [Divalproex Sodium] Unspecified     Muscle spasms/muscle pain and weakness     • Doxycycline Anaphylaxis and Vomiting     RXN=unknown   • Abilify Unspecified     Headaches/muscle twitching     • Amitriptyline Unspecified     Headaches     • Amoxicillin Rash     Pt states \"I get a rash\".     • Ciprofloxacin Rash     Pt states \"I get a rash\".     • Clindamycin Nausea     Even with food     • Ees [Erythromycin] Vomiting and Nausea   • Flagyl [Metronidazole Hcl] Unspecified     \"eye problems\"     • Flomax [Tamsulosin Hydrochloride] Swelling   • Metformin Unspecified     Increased lactic acid      • Sulfa Drugs Hives and Rash     RXN=since childhood  PATIENT DID FINE WITH BACTRIM X 2 COURSES 10/2018   • Tape Rash     Tears skin off   coban with Tegaderm tape ok  RXN=ongoing   • Vancomycin Itching     Pt becomes flushed in face and chest.   RXN=7/10/16   • Wound Dressing Adhesive Hives     By pt report   • Cephalexin [Keflex] Rash     Pt states she gets a rash with this " medication  Tolerates ceftriaxone   • Erythromycin Rash     .   • Levofloxacin Unspecified     Leg muscle cramps   • Metronidazole Rash     .   • Valproic Acid Rash     .         SOCIAL HISTORY:   Social History     Social History   • Marital status: Single     Spouse name: N/A   • Number of children: N/A   • Years of education: N/A     Social History Main Topics   • Smoking status: Never Smoker   • Smokeless tobacco: Never Used   • Alcohol use No   • Drug use: Yes     Frequency: 7.0 times per week     Types: Marijuana, Inhaled      Comment: Medicinal edible's   • Sexual activity: Not Currently     Birth control/ protection: Pill     Other Topics Concern   • Not on file     Social History Narrative    ** Merged History Encounter **             REVIEW OF SYSTEMS:   Review of Systems   Constitutional: Negative for chills and fever.   Respiratory: Negative for cough and shortness of breath.         She uses supplemental oxygen as needed   Cardiovascular: Negative for chest pain.   Gastrointestinal: Positive for nausea and vomiting.        States she has chronic nausea and vomiting   Genitourinary: Positive for dysuria.        Frequent UTIs.  Currently on MAcrobid   Neurological: Negative for dizziness.   Psychiatric/Behavioral: Negative for depression.       PHYSICAL EXAMINATION:     Physical Exam   Constitutional: She is oriented to person, place, and time and well-developed, well-nourished, and in no distress.   She is obese   HENT:   Head: Normocephalic.   Eyes: Pupils are equal, round, and reactive to light.   Pulmonary/Chest: Effort normal.   Musculoskeletal: Normal range of motion.   Neurological: She is alert and oriented to person, place, and time.   Skin: Skin is warm.   Shallow dry wounds to left breast  See wound documentation   Psychiatric: Affect normal.       Wound Assessment   Wound 11/13/18 L breast (Active)   Wound Image   11/13/2018  8:00 AM   Site Assessment Dry;Pink;Yellow 11/13/2018  8:00 AM    Lucretia-wound Assessment Intact 11/13/2018  8:00 AM   Margins Attached edges;Defined edges 11/13/2018  8:00 AM   Wound Length (cm) 1 cm 11/13/2018  8:00 AM   Wound Width (cm) 1.5 cm 11/13/2018  8:00 AM   Wound Depth (cm) 0 cm 11/13/2018  8:00 AM   Wound Surface Area (cm^2) 1.5 cm^2 11/13/2018  8:00 AM   Closure Secondary intention 11/13/2018  8:00 AM   Drainage Amount None 11/13/2018  8:00 AM   Non-staged Wound Description Partial thickness 11/13/2018  8:00 AM   Treatments Cleansed 11/13/2018  8:00 AM   Cleansing Normal Saline Irrigation 11/13/2018  8:00 AM   Periwound Protectant Skin Protectant wipes to Periwound 11/13/2018  8:00 AM   Dressing Options Honey Colloid;Adhesive Bandage 11/13/2018  8:00 AM   Dressing Changed New 11/13/2018  8:00 AM   Dressing Status Clean;Dry;Intact 11/13/2018  8:00 AM   Dressing Change Frequency Every 72 hrs 11/13/2018  8:00 AM   WOUND NURSE ONLY - Odor None 11/13/2018  8:00 AM   WOUND NURSE ONLY - Exposed Structures None 11/13/2018  8:00 AM   WOUND NURSE ONLY - Tissue Type and Percentage 100% dry pink/yellow crust 11/13/2018  8:00 AM     Wound photo, no debridement        PROCEDURE  Wound care completed by Shar        PATIENT EDUCATION  -Advised patient to use hypoallergenic soaps and detergents, consider changing all of her clothing to cotton only.  Assure that none of her clothing is rubbing on her breast.  -Pt advised to go to ER for any increased redness, swelling, drainage or odor, or if he develops fever, chills, nausea or vomiting.    ASSESSMENT AND PLAN:   1. Wound of left breast, subsequent encounter  Comments: Patient states wounds have been recurring off and on for 10 years.    11/13: Etiology unclear.  Patient has extensive list of allergies.  Suspect possible dermatitis.  Advised patient to use hypoallergenic soaps and detergents.  Also advised that she wear loose fitting clothing, consider the wearing cotton only garments.  Honey colloid applied to wounds today, for  autolytic debridement.  She is to to return to clinic in 1 week for reassessment.  If no improvement with above interventions, consider dermatology referral.      2. Morbidly obese ([facility])  Comments: Complicating factor.  Impaired wound healing.      3. Controlled type 2 diabetes mellitus without complication, without long-term current use of insulin ([facility])  Comments: Diabetes is currently well controlled.  A1c 5.7.

## 2018-11-14 ASSESSMENT — ENCOUNTER SYMPTOMS
LOSS OF SENSATION IN FEET: 1
OCCASIONAL FEELINGS OF UNSTEADINESS: 1
DEPRESSION: 0

## 2018-11-15 ENCOUNTER — OFFICE VISIT (OUTPATIENT)
Dept: MEDICAL GROUP | Facility: MEDICAL CENTER | Age: 28
End: 2018-11-15
Payer: MEDICARE

## 2018-11-15 VITALS
HEIGHT: 65 IN | WEIGHT: 268 LBS | HEART RATE: 66 BPM | SYSTOLIC BLOOD PRESSURE: 108 MMHG | TEMPERATURE: 97 F | DIASTOLIC BLOOD PRESSURE: 70 MMHG | OXYGEN SATURATION: 98 % | BODY MASS INDEX: 44.65 KG/M2

## 2018-11-15 DIAGNOSIS — S21.002D WOUND OF LEFT BREAST, SUBSEQUENT ENCOUNTER: ICD-10-CM

## 2018-11-15 DIAGNOSIS — E11.9 CONTROLLED TYPE 2 DIABETES MELLITUS WITHOUT COMPLICATION, WITHOUT LONG-TERM CURRENT USE OF INSULIN (HCC): ICD-10-CM

## 2018-11-15 DIAGNOSIS — M79.2 NEUROPATHIC PAIN: ICD-10-CM

## 2018-11-15 PROCEDURE — 99214 OFFICE O/P EST MOD 30 MIN: CPT | Performed by: INTERNAL MEDICINE

## 2018-11-15 RX ORDER — LIDOCAINE 50 MG/G
1 PATCH TOPICAL EVERY 24 HOURS
Qty: 10 PATCH | Refills: 2 | Status: SHIPPED | OUTPATIENT
Start: 2018-11-15 | End: 2019-05-27

## 2018-11-15 NOTE — PROGRESS NOTES
RN-TOMY Note    Subjective:     Health changes since last visit/interval Hx: States having a lot of problems with her health.  She has a new outbreak of shingles.  Medications (including changes made today)  Current Outpatient Prescriptions   Medication Sig Dispense Refill   • baclofen (LIORESAL) 10 MG Tab TAKE ONE TABLET BY MOUTH THREE TIMES DAILY 90 Tab 4   • pregabalin (LYRICA) 100 MG Cap Take 1 Cap by mouth 2 times a day for 90 days. 60 Cap 2   • cholestyramine (QUESTRAN,PREVALITE) 4 GM Pack Take 4 g by mouth every morning.     • melatonin 3 MG Tab Take 6 mg by mouth every bedtime.     • traZODone (DESYREL) 100 MG Tab Take 100 mg by mouth every bedtime.     • vitamin D (CHOLECALCIFEROL) 1000 UNIT Tab Take 1,000 Units by mouth every day.     • nystatin (MYCOSTATIN) 154297 UNIT/GM Cream topical cream Apply 0.0001 g to affected area(s) 2 times a day. 1 Tube 3   • furosemide (LASIX) 40 MG Tab Take 40 mg by mouth every day.     • cyanocobalamin (CVS VITAMIN B-12) 500 MCG tablet Take 500 mcg by mouth every day.     • busPIRone (BUSPAR) 10 MG Tab Take 0.5 Tabs by mouth 2 times a day. 60 Tab 2   • liraglutide (VICTOZA) 18 MG/3ML Solution Pen-injector injection Inject 1.8 mg as instructed every day.     • ziprasidone (GEODON) 80 MG Cap Take 80 mg by mouth 2 Times a Day.     • ivabradine (CORLANOR) 5 MG Tab tablet Take 1 Tab by mouth 2 times a day, with meals. 60 Tab 11   • sodium bicarbonate (SODIUM BICARBONATE) 650 MG Tab Take 650 mg by mouth 2 times a day.     • fluoxetine (PROZAC) 10 MG Cap TAKE ONE CAPSULE BY MOUTH ONCE A DAY 30 Cap 11   • aspirin EC (ECOTRIN) 81 MG Tablet Delayed Response Take 1 Tab by mouth every day. 30 Tab 6     No current facility-administered medications for this visit.        Taking daily ASA: Yes  Taking above medications as prescribed: yes  SIDE EFFECTS: Patient denies side effects to medications    Exercise: Up with walker around the house.  Exercise makes her heart increase and oxygen  decrease.  Diet: She eats 6 times daily and has lost some weight.  Patient's body mass index is 44.6 kg/m². Exercise and nutrition counseling were performed at this visit.      Health Maintenance:   Health Maintenance Due   Topic Date Due   • Annual Wellness Visit  07/23/2017       Immunizations:   PPSV23: Up-to-date  [id number]: Up-to-date  Tdap: Up-to-date  Flu: Up-to-date  Hep B: unknown    DM:   Last A1c:   Lab Results   Component Value Date/Time    HBA1C 5.7 10/29/2018 11:12 AM      A1C GOAL: < 6.5    Glucose monitoring frequency: Testing blood sugars daily randomly.  Her blood sugars ran high when she was on the steroids  Hypoglycemic episodes: yes - She states she feels low in the 70's.    Last Retinal Exam: on file and up-to-date  Daily Foot Exam: Yes   Routine Dental Exams: Yes    No results found for: MICROALBCALC, MALBCRT, MALBEXCR, [id number], MICROALBUR, MICRALB, UMICROALBUM, MICROALBTIM     ACR Albumin/Creatinine Ratio goal <30     HTN:   Blood pressure goal <140/<80 .   Currently Rx ACE/ARB: No    Dyslipidemia:    Lab Results   Component Value Date/Time    CHOLSTRLTOT 236 (H) 07/15/2018 02:45 AM     (H) 07/15/2018 02:45 AM    HDL 75 07/15/2018 02:45 AM    TRIGLYCERIDE 121 07/15/2018 02:45 AM       Lab Results   Component Value Date/Time    SODIUM 139 10/26/2018 04:04 AM    POTASSIUM 3.8 10/26/2018 04:04 AM    CHLORIDE 108 10/26/2018 04:04 AM    CO2 21 10/26/2018 04:04 AM    GLUCOSE 103 (H) 10/26/2018 04:04 AM    BUN 8 10/26/2018 04:04 AM    CREATININE 0.82 10/26/2018 04:04 AM    CREATININE 0.75 (L) 07/20/2010 11:00 AM    BUNCREATRAT 19 07/20/2010 11:00 AM    GLOMRATE >59 07/20/2010 11:00 AM     Lab Results   Component Value Date/Time    ALKPHOSPHAT 52 10/26/2018 04:04 AM    ASTSGOT 27 10/26/2018 04:04 AM    ALTSGPT 59 (H) 10/26/2018 04:04 AM    TBILIRUBIN 0.6 10/26/2018 04:04 AM        Currently Rx Statin: Yes    She  reports that she has never smoked. She has never used smokeless  tobacco.    Objective:     Exam:  Monofilament: done   Monofilament testing with a 10 gram force: sensation intact: intact bilaterally  Visual Inspection: Feet without maceration, ulcers, fissures.  She is retaining more fluid than usual.  Pedal pulses: intact bilaterally    Plan:     Discussed and educated on:   - All medications, side effects and compliance (discussed carefully)  - Annual eye examinations at Ophthalmology  - Home glucose monitoring emphasized  - Weight control and daily exercise    Recommended medication changes: She has cut out all regular soda and frozen dinners.

## 2018-11-15 NOTE — PROGRESS NOTES
CC: Follow-up diabetes, neuropathic pain, breast wound.    HPI:   Kristin presents today with the following.    1. Neuropathic pain  Reports recently diagnosed with shingles having armpit pain.  She has no fevers or chills no spreading redness but reports pain is 8 out of 10 in intensity around the rash.    2. Controlled type 2 diabetes mellitus without complication, without long-term current use of insulin ([facility])  Blood sugars under excellent control A1c is down to under 6 on current medications without hypoglycemia.  She met with diabetic nurse today.  Weight has decreased slightly after she quit sodas.    3. Wound of left breast, subsequent encounter  Wound on the breast is beginning to heal she will follow-up with wound care.      Patient Active Problem List    Diagnosis Date Noted   • Cellulitis of left breast 08/03/2018     Priority: High   • Left leg weakness 07/14/2018     Priority: High   • Controlled type 2 diabetes mellitus without complication, without long-term current use of insulin ([facility]) 04/26/2017     Priority: High   • Sinus tachycardia 10/31/2013     Priority: High   • Chronic inflammatory arthritis 05/23/2016     Priority: Medium   • Morbid obesity with BMI of 45.0-49.9, adult ([facility]) 10/24/2017     Priority: Low   • Depression 10/28/2016     Priority: Low   • Schizophrenia ([facility]) 10/27/2016     Priority: Low   • Psychosis, schizophrenia, simple ([facility]) 09/29/2016     Priority: Low     Class: Chronic   • Acquired hypothyroidism 11/23/2015     Priority: Low   • PCOS (polycystic ovarian syndrome) 11/23/2015     Priority: Low   • Progressive focal motor weakness 06/28/2015     Priority: Low   • Fatty liver disease, nonalcoholic 01/19/2015     Priority: Low   • Anxiety 12/16/2014     Priority: Low   • Knee pain, right 02/13/2014     Priority: Low   • Neurogenic bladder 04/02/2011     Priority: Low   • Chronic UTI 09/18/2010     Priority: Low   • Borderline personality disorder in adult ([facility]) 09/18/2010      Priority: Low   • Asymptomatic bacteriuria 10/26/2018   • Palpitations 10/01/2018   • Chronic respiratory failure with hypoxia, on home oxygen therapy ([facility]) 08/08/2018   • Transaminitis 08/08/2018   • TONYA (obstructive sleep apnea) 01/09/2018   • Functional diarrhea 01/05/2018   • Breast wound 11/06/2017   • Intractable episodic cluster headache 09/14/2017   • Rash 06/09/2017   • Hashimoto's encephalopathy 05/17/2017   • Fall at home 05/13/2017   • On home oxygen therapy 04/15/2017   • Chest pain 03/30/2017   • Weakness of right upper extremity 02/23/2017   • Chronic suprapubic catheter ([facility]) 02/16/2017   • Hypovitaminosis D 11/29/2016   • Chronic pain syndrome 10/27/2016   • Bowel and bladder incontinence 10/27/2016   • Galactorrhea 07/22/2016   • Elevated sedimentation rate 06/27/2016   • Weakness of both lower extremities 06/22/2016   • Vitamin D deficiency 05/21/2016   • Morbidly obese ([facility]) 03/07/2016   • Scoliosis 03/07/2016   • GERD (gastroesophageal reflux disease) 03/07/2016   • Peripheral neuropathy (CMS-HCC) 03/06/2016   • H/O prior ablation treatment 02/10/2016       Current Outpatient Prescriptions   Medication Sig Dispense Refill   • lidocaine (LIDODERM) 5 % Patch Apply 1 Patch to skin as directed every 24 hours. 10 Patch 2   • baclofen (LIORESAL) 10 MG Tab TAKE ONE TABLET BY MOUTH THREE TIMES DAILY 90 Tab 4   • pregabalin (LYRICA) 100 MG Cap Take 1 Cap by mouth 2 times a day for 90 days. 60 Cap 2   • cholestyramine (QUESTRAN,PREVALITE) 4 GM Pack Take 4 g by mouth every morning.     • melatonin 3 MG Tab Take 6 mg by mouth every bedtime.     • traZODone (DESYREL) 100 MG Tab Take 100 mg by mouth every bedtime.     • vitamin D (CHOLECALCIFEROL) 1000 UNIT Tab Take 1,000 Units by mouth every day.     • nystatin (MYCOSTATIN) 104812 UNIT/GM Cream topical cream Apply 0.0001 g to affected area(s) 2 times a day. 1 Tube 3   • furosemide (LASIX) 40 MG Tab Take 40 mg by mouth every day.     • cyanocobalamin ([employer]S  "VITAMIN B-12) 500 MCG tablet Take 500 mcg by mouth every day.     • busPIRone (BUSPAR) 10 MG Tab Take 0.5 Tabs by mouth 2 times a day. 60 Tab 2   • liraglutide (VICTOZA) 18 MG/3ML Solution Pen-injector injection Inject 1.8 mg as instructed every day.     • ziprasidone (GEODON) 80 MG Cap Take 80 mg by mouth 2 Times a Day.     • ivabradine (CORLANOR) 5 MG Tab tablet Take 1 Tab by mouth 2 times a day, with meals. 60 Tab 11   • sodium bicarbonate (SODIUM BICARBONATE) 650 MG Tab Take 650 mg by mouth 2 times a day.     • fluoxetine (PROZAC) 10 MG Cap TAKE ONE CAPSULE BY MOUTH ONCE A DAY 30 Cap 11   • aspirin EC (ECOTRIN) 81 MG Tablet Delayed Response Take 1 Tab by mouth every day. 30 Tab 6     No current facility-administered medications for this visit.          Allergies as of 11/15/2018 - Reviewed 11/15/2018   Allergen Reaction Noted   • Cefdinir Shortness of Breath and Itching 03/01/2016   • Depakote [divalproex sodium] Unspecified 06/14/2010   • Doxycycline Anaphylaxis and Vomiting 08/15/2012   • Abilify Unspecified 01/17/2013   • Amitriptyline Unspecified 10/31/2013   • Amoxicillin Rash 09/18/2010   • Ciprofloxacin Rash 12/17/2009   • Clindamycin Nausea 02/02/2011   • Ees [erythromycin] Vomiting and Nausea 08/28/2010   • Flagyl [metronidazole hcl] Unspecified 03/31/2011   • Flomax [tamsulosin hydrochloride] Swelling 09/24/2009   • Metformin Unspecified 07/23/2013   • Sulfa drugs Hives and Rash 09/18/2010   • Tape Rash 08/15/2012   • Vancomycin Itching 07/10/2016   • Wound dressing adhesive Hives 01/12/2018   • Cephalexin [keflex] Rash 01/01/2017   • Erythromycin Rash 03/30/2017   • Levofloxacin Unspecified 10/27/2016   • Metronidazole Rash 03/30/2017   • Valproic acid Rash 03/30/2017        ROS: Denies Chest pain, SOB, LE edema.    /70   Pulse 66   Temp 36.1 °C (97 °F)   Ht 1.651 m (5' 5\")   Wt 121.6 kg (268 lb)   SpO2 98%   BMI 44.60 kg/m²     Physical Exam:  Gen:         Alert and oriented, No apparent " distress.  Neck:        No Lymphadenopathy or Bruits.  Lungs:     Clear to auscultation bilaterally  CV:          Regular rate and rhythm. No murmurs, rubs or gallops.               Ext:          No clubbing, cyanosis, edema.      Assessment and Plan.   28 y.o. female with the following issues.    1. Neuropathic pain  Placing on Lidoderm patches for pain.  - lidocaine (LIDODERM) 5 % Patch; Apply 1 Patch to skin as directed every 24 hours.  Dispense: 10 Patch; Refill: 2    2. Controlled type 2 diabetes mellitus without complication, without long-term current use of insulin (HCC)  Currently well controlled no changes. Discussed diet and exercise recheck A1c in 6 months. Reminded about yearly eye exam.    3. Wound of left breast, subsequent encounter  Follow with wound care.

## 2018-11-16 ENCOUNTER — PATIENT OUTREACH (OUTPATIENT)
Dept: HEALTH INFORMATION MANAGEMENT | Facility: OTHER | Age: 28
End: 2018-11-16

## 2018-11-16 ENCOUNTER — TELEPHONE (OUTPATIENT)
Dept: HEALTH INFORMATION MANAGEMENT | Facility: OTHER | Age: 28
End: 2018-11-16

## 2018-11-16 NOTE — TELEPHONE ENCOUNTER
Pt applying for RTC Access. Please complete RTC Access medication professional portion if service is deemed appropriate by PCP.     · Faxed to [phone]  · Please confirm completion with this SW  · Upload to Media    Plan:  Message routed to PCP

## 2018-11-16 NOTE — PROGRESS NOTES
TC from pt.   Assessment completed as follows:   Financial limitations/difficulties  Pt currently receiving SSD/SSI is $770/mo and is Medicare/Medicaid. Is interested in cost savings via food and housing.   Food insecurities  Pt reports using foodstamps and having no funds available. Spoke about [employer] Voucher, pt agreeable to pursuing service.  Lack of housing/environmental issues  Pt interested in Section 8 housing as pt reports grandparent would like her out of home. Pt educated on open application via [employer] website.  Pt requests assistance in completing application over the phone support. Pt informed she will need to provide proof of income. SW provided pt with [employer] contact information and website to request new letter.  Transportation  Pt previously attempted to use MTM who referred her to RTC. Pt interested in establishing with RTC. Agreeable to SW assisting in application process.   Social isolation/support/(NOK) next-of-kin  Pt’s caregiver is +80 year-old grandmother she identifies as “Mom.” Per chart review an uncle and aunt are also available to pt. Pt reports having 3 additional female siblings but are unreliable supports.   Loss of independence-need assistance with ADLs/IADLs  Pt reports previously using Medicaid caregiving/homemaker services but her Grandmother “fired” the providers. Pt has not re-pursued services. Pt is agreeable to re-addressing this need.   Advanced life planning  Not on file.   Plan    TC to pt at later time/date  Message routed to PCP regarding RTC Acces Medical Professional portion  Fax to PCP (RTC Access Medical Professional portion)

## 2018-11-16 NOTE — PROGRESS NOTES
Initial Outreach from Care Coordination . Contacted pt via phone to discuss role of social service care coordination and patient’s identified social needs. Referral received from [facility] SONYA Leach regarding resources for assessment of social needs.     VM(1) left requesting return TC.    Plan:  2nd Initial Outreach TC to pt at later time/date

## 2018-11-19 ENCOUNTER — OFFICE VISIT (OUTPATIENT)
Dept: WOUND CARE | Facility: MEDICAL CENTER | Age: 28
End: 2018-11-19
Attending: INTERNAL MEDICINE
Payer: MEDICARE

## 2018-11-19 ENCOUNTER — PATIENT OUTREACH (OUTPATIENT)
Dept: HEALTH INFORMATION MANAGEMENT | Facility: OTHER | Age: 28
End: 2018-11-19

## 2018-11-19 VITALS
RESPIRATION RATE: 18 BRPM | OXYGEN SATURATION: 99 % | TEMPERATURE: 98.3 F | HEART RATE: 69 BPM | DIASTOLIC BLOOD PRESSURE: 80 MMHG | SYSTOLIC BLOOD PRESSURE: 125 MMHG

## 2018-11-19 DIAGNOSIS — E66.01 MORBIDLY OBESE (HCC): ICD-10-CM

## 2018-11-19 DIAGNOSIS — S21.002D WOUND OF LEFT BREAST, SUBSEQUENT ENCOUNTER: Primary | ICD-10-CM

## 2018-11-19 DIAGNOSIS — E11.9 CONTROLLED TYPE 2 DIABETES MELLITUS WITHOUT COMPLICATION, WITHOUT LONG-TERM CURRENT USE OF INSULIN (HCC): ICD-10-CM

## 2018-11-19 PROCEDURE — 99212 OFFICE O/P EST SF 10 MIN: CPT | Performed by: NURSE PRACTITIONER

## 2018-11-19 PROCEDURE — 99213 OFFICE O/P EST LOW 20 MIN: CPT

## 2018-11-19 ASSESSMENT — ENCOUNTER SYMPTOMS
DEPRESSION: 0
SHORTNESS OF BREATH: 0
CHILLS: 0
FEVER: 0
DIZZINESS: 0
COUGH: 0
NAUSEA: 1
VOMITING: 1

## 2018-11-19 NOTE — PATIENT INSTRUCTIONS
-Wear loose clothing to prevent rubbing on the area.     -Apply medihoney and silicone border foam if wound reopens.     -You will need a new referral if you need to be seen at [facility] again.

## 2018-11-19 NOTE — PROGRESS NOTES
"Provider Encounter- Full Thickness wound      Patient seen in collaboration with wound care clinician,  Delicia Gao RN     HISTORY OF PRESENT ILLNESS:     START OF CARE IN CLINIC: 11/13/18     WOUND- Full thickness wounds   LOCATION: Left breast   WOUND HISTORY: Patient states she has had wounds to this breast, and occasionally to her right breast, off and on for the past 10 years.  States, \"they just pop up\".  She denies itching, or any type of picking behaviors.    PERTINENT PMH:    CURRENT TREATMENT: Morbid obesity, controlled type II diabetes, psychological disorders   LAST  WOUND CULTURE:  DATE : No current wound cultures in epic              CURRENT ABX: She is on Macrobid for UTI   MOST RECENT IMAGING: N/A       DIABETIC: Yes    Most recent A1c: 5.7 DATE 10/29/18    11/13: Patient presents today for initial evaluation.  Wounds are open to air and are dry.  She is currently receiving treatment for shingles to her right breast.  She is also on Macrobid for UTI.    11/19:  Pt presents today for reassessment.  Wounds are essentially resolved.  She has been using silicone foam dressings, tolerating these well.  She is confident that she can manage these wounds on her own.  Discharge from [facility].      PAST MEDICAL HISTORY:   Past Medical History:   Diagnosis Date   • Abdominal pain    • Anginal syndrome     random chest pain especially with tachycardia   • Apnea, sleep    • Arrhythmia     \"sinus tachycardia\", cariologist, Dr. Kumar; ablation 2/2016   • Arthritis     osteo   • ASTHMA     when around smoke   • Atrial fibrillation (HCC)    • Back pain    • Borderline personality disorder (HCC)    • Breath shortness     with tachycardia   • Cardiac arrhythmia    • Chickenpox    • Chronic UTI 9/18/2010   • Cough    • Daytime sleepiness    • Depression    • Diabetes (HCC)    • Diarrhea    • Disorder of thyroid    • Fall    • Fatigue    • Frequent headaches    • Gasping for breath    • Gynecological disorder     PCOS " "  • Headache(784.0)    • Heart burn    • History of falling    • Hypertension    • Migraine    • 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HCC)    • 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HCC)    • Nausea    • Obesity    • Pain 08-15-12    back, 7/10   • Painful joint    • PCOS (polycystic ovarian syndrome)    • Pneumonia 2012   • Psychosis (HCC)    • Renal disorder     \"kidney disease, stage 1\" nephrologist, Dr. 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   • Urinary bladder disorder     Suprapubic cath   • Urinary incontinence    • Weakness    • Wears glasses        PAST SURGICAL HISTORY:   Past Surgical History:   Procedure Laterality Date   • MUSCLE BIOPSY Right 1/26/2017    Procedure: MUSCLE BIOPSY - THIGH;  Surgeon: Isidro Vigil M.D.;  Location: [facility];  Service:    • GASTROSCOPY WITH BALLOON DILATATION N/A 1/4/2017    Procedure: GASTROSCOPY WITH DILATATION;  Surgeon: Torres Vargas M.D.;  Location: [facility];  Service:    • BOWEL STIMULATOR INSERTION  7/13/2016    Procedure: BOWEL STIMULATOR INSERTION FOR PERMANENT INTERSTIM SACRAL IMPLANT;  Surgeon: Joe Noyola M.D.;  Location: [facility];  Service:    • RECOVERY  1/27/2016    Procedure: CATH LAB EP STUDY WITH SINUS NODE MODIFICATION LA;  Surgeon: Recoveryonly Surgery;  Location: SURGERY PRE-POST PROC UNIT [facility];  Service:    • OTHER CARDIAC SURGERY  1/2016    cardiac ablation   • NEURO DEST FACET L/S W/IG SNGL  4/21/2015    Performed by Reza Tabor at [facility]   • LUMBAR FUSION ANTERIOR  8/21/2012    Performed by SUSIE SAWANT at [facility]   • ALYSSA BY LAPAROSCOPY  8/29/2010    Performed by SHAYY OJHNS at [facility] ORS   • LAMINOTOMY     • OTHER ABDOMINAL SURGERY     • " "TONSILLECTOMY      tonsillectomy        MEDICATIONS:   Current Outpatient Prescriptions   Medication   • lidocaine (LIDODERM) 5 % Patch   • baclofen (LIORESAL) 10 MG Tab   • pregabalin (LYRICA) 100 MG Cap   • cholestyramine (QUESTRAN,PREVALITE) 4 GM Pack   • melatonin 3 MG Tab   • traZODone (DESYREL) 100 MG Tab   • vitamin D (CHOLECALCIFEROL) 1000 UNIT Tab   • nystatin (MYCOSTATIN) 958451 UNIT/GM Cream topical cream   • furosemide (LASIX) 40 MG Tab   • cyanocobalamin ([employer] VITAMIN B-12) 500 MCG tablet   • busPIRone (BUSPAR) 10 MG Tab   • liraglutide (VICTOZA) 18 MG/3ML Solution Pen-injector injection   • ziprasidone (GEODON) 80 MG Cap   • ivabradine (CORLANOR) 5 MG Tab tablet   • sodium bicarbonate (SODIUM BICARBONATE) 650 MG Tab   • fluoxetine (PROZAC) 10 MG Cap   • aspirin EC (ECOTRIN) 81 MG Tablet Delayed Response     No current facility-administered medications for this visit.        ALLERGIES:    Allergies   Allergen Reactions   • Cefdinir Shortness of Breath and Itching     Tolerated 1/18/17  Tolerates ceftriaxone    • Depakote [Divalproex Sodium] Unspecified     Muscle spasms/muscle pain and weakness     • Doxycycline Anaphylaxis and Vomiting     RXN=unknown   • Abilify Unspecified     Headaches/muscle twitching     • Amitriptyline Unspecified     Headaches     • Amoxicillin Rash     Pt states \"I get a rash\".     • Ciprofloxacin Rash     Pt states \"I get a rash\".     • Clindamycin Nausea     Even with food     • Ees [Erythromycin] Vomiting and Nausea   • Flagyl [Metronidazole Hcl] Unspecified     \"eye problems\"     • Flomax [Tamsulosin Hydrochloride] Swelling   • Metformin Unspecified     Increased lactic acid      • Sulfa Drugs Hives and Rash     RXN=since childhood  PATIENT DID FINE WITH BACTRIM X 2 COURSES 10/2018   • Tape Rash     Tears skin off   coban with Tegaderm tape ok  RXN=ongoing   • Vancomycin Itching     Pt becomes flushed in face and chest.   RXN=7/10/16   • Wound Dressing Adhesive Hives     By " pt report   • Cephalexin [Keflex] Rash     Pt states she gets a rash with this medication  Tolerates ceftriaxone   • Erythromycin Rash     .   • Levofloxacin Unspecified     Leg muscle cramps   • Metronidazole Rash     .   • Valproic Acid Rash     .         SOCIAL HISTORY:   Social History     Social History   • Marital status: Single     Spouse name: N/A   • Number of children: N/A   • Years of education: N/A     Social History Main Topics   • Smoking status: Never Smoker   • Smokeless tobacco: Never Used   • Alcohol use No   • Drug use: Yes     Frequency: 7.0 times per week     Types: Marijuana, Inhaled      Comment: Medicinal edible's   • Sexual activity: Not Currently     Birth control/ protection: Pill     Other Topics Concern   • Not on file     Social History Narrative    ** Merged History Encounter **             REVIEW OF SYSTEMS:   Review of Systems   Constitutional: Negative for chills and fever.   Respiratory: Negative for cough and shortness of breath.         She uses supplemental oxygen as needed   Cardiovascular: Negative for chest pain.   Gastrointestinal: Positive for nausea and vomiting.        States she has chronic nausea and vomiting   Genitourinary: Positive for dysuria.        Frequent UTIs   Neurological: Negative for dizziness.   Psychiatric/Behavioral: Negative for depression.       PHYSICAL EXAMINATION:   /80   Pulse 69   Temp 36.8 °C (98.3 °F)   Resp 18   SpO2 99%   Physical Exam   Constitutional: She is oriented to person, place, and time and well-developed, well-nourished, and in no distress.   She is obese   HENT:   Head: Normocephalic.   Eyes: Pupils are equal, round, and reactive to light.   Pulmonary/Chest: Effort normal.   Musculoskeletal: Normal range of motion.   Neurological: She is alert and oriented to person, place, and time.   Skin: Skin is warm.   Shallow dry wounds to left breast  See wound documentation   Psychiatric: Affect normal.       Wound Assessment -  refer to flowsheet    Wound photo, no debridement. Resolved            PROCEDURE  Wound care completed by Sang- silicone foam dressing for protection        PATIENT EDUCATION  -Advised patient to use hypoallergenic soaps and detergents, consider changing all of her clothing to cotton only.  Assure that none of her clothing is rubbing on her breast.  -Pt advised to go to ER for any increased redness, swelling, drainage or odor, or if he develops fever, chills, nausea or vomiting.    ASSESSMENT AND PLAN:   1. Wound of left breast, subsequent encounter  Comments: Patient states wounds have been recurring off and on for 10 years.    11/13: Etiology unclear.  Patient has extensive list of allergies.  Suspect possible dermatitis.  Advised patient to use hypoallergenic soaps and detergents.  Also advised that she wear loose fitting clothing, consider the wearing cotton only garments.  Honey colloid applied to wounds today, for autolytic debridement.  She is to to return to clinic in 1 week for reassessment.  If no improvement with above interventions, consider dermatology referral.  11/19: Wounds improved, nearly resolved.  Silicone dressings have been effective.  Patient is confident she can manage these wounds on her own from here on out.  Discharge from [facility].  Patient advised to return to clinic if her wounds deteriorate or fail to heal.    2. Morbidly obese ([facility])  Comments: Complicating factor.  Impaired wound healing.      3. Controlled type 2 diabetes mellitus without complication, without long-term current use of insulin ([facility])  Comments: Diabetes is currently well controlled.  A1c 5.7.

## 2018-11-20 ENCOUNTER — PATIENT OUTREACH (OUTPATIENT)
Dept: HEALTH INFORMATION MANAGEMENT | Facility: OTHER | Age: 28
End: 2018-11-20

## 2018-11-20 ENCOUNTER — HOSPITAL ENCOUNTER (OUTPATIENT)
Dept: LAB | Facility: MEDICAL CENTER | Age: 28
End: 2018-11-20
Attending: INTERNAL MEDICINE
Payer: MEDICARE

## 2018-11-20 LAB
25(OH)D3 SERPL-MCNC: 18 NG/ML (ref 30–100)
ALBUMIN SERPL BCP-MCNC: 4.4 G/DL (ref 3.2–4.9)
ALBUMIN/GLOB SERPL: 1.8 G/DL
ALP SERPL-CCNC: 49 U/L (ref 30–99)
ALT SERPL-CCNC: 52 U/L (ref 2–50)
AMORPH CRY #/AREA URNS HPF: PRESENT /HPF
ANION GAP SERPL CALC-SCNC: 9 MMOL/L (ref 0–11.9)
APPEARANCE UR: ABNORMAL
AST SERPL-CCNC: 27 U/L (ref 12–45)
BACTERIA #/AREA URNS HPF: ABNORMAL /HPF
BASOPHILS # BLD AUTO: 0.7 % (ref 0–1.8)
BASOPHILS # BLD: 0.04 K/UL (ref 0–0.12)
BILIRUB SERPL-MCNC: 0.5 MG/DL (ref 0.1–1.5)
BILIRUB UR QL STRIP.AUTO: NEGATIVE
BUN SERPL-MCNC: 7 MG/DL (ref 8–22)
CALCIUM SERPL-MCNC: 9.3 MG/DL (ref 8.5–10.5)
CHLORIDE SERPL-SCNC: 108 MMOL/L (ref 96–112)
CHOLEST SERPL-MCNC: 173 MG/DL (ref 100–199)
CO2 SERPL-SCNC: 20 MMOL/L (ref 20–33)
COLOR UR: YELLOW
CREAT SERPL-MCNC: 0.68 MG/DL (ref 0.5–1.4)
CREAT UR-MCNC: 199.6 MG/DL
EOSINOPHIL # BLD AUTO: 0.12 K/UL (ref 0–0.51)
EOSINOPHIL NFR BLD: 2.2 % (ref 0–6.9)
EPI CELLS #/AREA URNS HPF: ABNORMAL /HPF
ERYTHROCYTE [DISTWIDTH] IN BLOOD BY AUTOMATED COUNT: 42.4 FL (ref 35.9–50)
FASTING STATUS PATIENT QL REPORTED: NORMAL
FERRITIN SERPL-MCNC: 107.8 NG/ML (ref 10–291)
GLOBULIN SER CALC-MCNC: 2.4 G/DL (ref 1.9–3.5)
GLUCOSE SERPL-MCNC: 117 MG/DL (ref 65–99)
GLUCOSE UR STRIP.AUTO-MCNC: NEGATIVE MG/DL
HCT VFR BLD AUTO: 42.9 % (ref 37–47)
HDLC SERPL-MCNC: 50 MG/DL
HGB BLD-MCNC: 14.2 G/DL (ref 12–16)
IMM GRANULOCYTES # BLD AUTO: 0.01 K/UL (ref 0–0.11)
IMM GRANULOCYTES NFR BLD AUTO: 0.2 % (ref 0–0.9)
IRON SATN MFR SERPL: 19 % (ref 15–55)
IRON SERPL-MCNC: 83 UG/DL (ref 40–170)
KETONES UR STRIP.AUTO-MCNC: NEGATIVE MG/DL
LDLC SERPL CALC-MCNC: 96 MG/DL
LEUKOCYTE ESTERASE UR QL STRIP.AUTO: ABNORMAL
LYMPHOCYTES # BLD AUTO: 1.79 K/UL (ref 1–4.8)
LYMPHOCYTES NFR BLD: 33.5 % (ref 22–41)
MAGNESIUM SERPL-MCNC: 1.7 MG/DL (ref 1.5–2.5)
MCH RBC QN AUTO: 30.2 PG (ref 27–33)
MCHC RBC AUTO-ENTMCNC: 33.1 G/DL (ref 33.6–35)
MCV RBC AUTO: 91.3 FL (ref 81.4–97.8)
MICRO URNS: ABNORMAL
MONOCYTES # BLD AUTO: 0.43 K/UL (ref 0–0.85)
MONOCYTES NFR BLD AUTO: 8.1 % (ref 0–13.4)
MUCOUS THREADS #/AREA URNS HPF: ABNORMAL /HPF
NEUTROPHILS # BLD AUTO: 2.95 K/UL (ref 2–7.15)
NEUTROPHILS NFR BLD: 55.3 % (ref 44–72)
NITRITE UR QL STRIP.AUTO: NEGATIVE
NRBC # BLD AUTO: 0 K/UL
NRBC BLD-RTO: 0 /100 WBC
PH UR STRIP.AUTO: 6 [PH]
PHOSPHATE SERPL-MCNC: 2.1 MG/DL (ref 2.5–4.5)
PLATELET # BLD AUTO: 200 K/UL (ref 164–446)
PMV BLD AUTO: 12.1 FL (ref 9–12.9)
POTASSIUM SERPL-SCNC: 3.9 MMOL/L (ref 3.6–5.5)
PROT SERPL-MCNC: 6.8 G/DL (ref 6–8.2)
PROT UR QL STRIP: NEGATIVE MG/DL
PROT UR-MCNC: 9 MG/DL (ref 0–15)
PTH-INTACT SERPL-MCNC: 63.4 PG/ML (ref 14–72)
RBC # BLD AUTO: 4.7 M/UL (ref 4.2–5.4)
RBC UR QL AUTO: NEGATIVE
SODIUM SERPL-SCNC: 137 MMOL/L (ref 135–145)
SP GR UR STRIP.AUTO: >=1.03
TIBC SERPL-MCNC: 427 UG/DL (ref 250–450)
TRIGL SERPL-MCNC: 134 MG/DL (ref 0–149)
URATE SERPL-MCNC: 6.7 MG/DL (ref 1.9–8.2)
UROBILINOGEN UR STRIP.AUTO-MCNC: 0.2 MG/DL
WBC # BLD AUTO: 5.3 K/UL (ref 4.8–10.8)
WBC #/AREA URNS HPF: ABNORMAL /HPF

## 2018-11-20 PROCEDURE — 82306 VITAMIN D 25 HYDROXY: CPT | Mod: GA

## 2018-11-20 PROCEDURE — 83540 ASSAY OF IRON: CPT | Mod: GA

## 2018-11-20 PROCEDURE — 84100 ASSAY OF PHOSPHORUS: CPT

## 2018-11-20 PROCEDURE — 80053 COMPREHEN METABOLIC PANEL: CPT

## 2018-11-20 PROCEDURE — 83550 IRON BINDING TEST: CPT | Mod: GA

## 2018-11-20 PROCEDURE — 84550 ASSAY OF BLOOD/URIC ACID: CPT

## 2018-11-20 PROCEDURE — 82728 ASSAY OF FERRITIN: CPT | Mod: GA

## 2018-11-20 PROCEDURE — 81001 URINALYSIS AUTO W/SCOPE: CPT

## 2018-11-20 PROCEDURE — 84156 ASSAY OF PROTEIN URINE: CPT

## 2018-11-20 PROCEDURE — 80061 LIPID PANEL: CPT | Mod: GA

## 2018-11-20 PROCEDURE — 83735 ASSAY OF MAGNESIUM: CPT

## 2018-11-20 PROCEDURE — 83970 ASSAY OF PARATHORMONE: CPT

## 2018-11-20 PROCEDURE — 85025 COMPLETE CBC W/AUTO DIFF WBC: CPT

## 2018-11-20 PROCEDURE — 82570 ASSAY OF URINE CREATININE: CPT

## 2018-11-20 PROCEDURE — 36415 COLL VENOUS BLD VENIPUNCTURE: CPT

## 2018-11-20 NOTE — PROGRESS NOTES
Outbound call to patient for med rec. Left  for patient to call [phone].  1st attempt to reach patient.

## 2018-11-26 ENCOUNTER — PATIENT OUTREACH (OUTPATIENT)
Dept: HEALTH INFORMATION MANAGEMENT | Facility: OTHER | Age: 28
End: 2018-11-26

## 2018-11-26 NOTE — PROGRESS NOTES
TC from pt. Pt requesting KRYSTIAN's fax number to obtain SS Award letter. Pt also inquired about Medicaid provided emergency phones. SW provided variation of providers to include [employer] and [employer]. Pt declined additional options.     Pt anticipates fax will be received by KRYSTIAN today's date.     Plan:  TC to pt at alter time/date to confirm SS Award Letter was recieved

## 2018-11-26 NOTE — PROGRESS NOTES
Fax received from pt including SS award letter and [employer] confirmation number.     TC to pt confirming receival. Pt agreeable to SW forwarding documents on the [facility] to be added to section 8/low incoming housing application.     Faxed to [phone]. Uploaded to media.    Plan:  TC to pt at later time/date

## 2018-11-28 ENCOUNTER — PATIENT OUTREACH (OUTPATIENT)
Dept: HEALTH INFORMATION MANAGEMENT | Facility: OTHER | Age: 28
End: 2018-11-28

## 2018-11-28 ASSESSMENT — ENCOUNTER SYMPTOMS: DIABETIC SYMPTOM PROGRESSION: STABLE

## 2018-11-28 NOTE — PROGRESS NOTES
"     Situation, Background, Assessment, Recommendation     Situation    Routine outgoing TC to patient for follow up. Patient states \"I'm falling apart.\" Patient states she dislocated (non-traumatic) her hip and shoulder while laying down. She hoped it would resolve, but didn't after 4 days and went to the [facility] where she received treatment. Patient states she fell backward after her hip \"popped out\" while trying to make her bed. Denies head strike or injury.    Background       Patient provided updates on issues identified during last [facility] RN call:     Patient reports her eyes are much better, \"still a little fuzzy vision\", but overall improved.     Patient is happy with assistance from [facility] SW in receiving helping with cell phone, food, housing, RTC Access application.     Patient reports she was able to go to the food pantry with [employer] prescription to get healthy food choices, she prepared lentil soup for herself.     Assessment    Patient states she recently realized her insurance isn't covering all her medications, it didn't pay for vitamin D, lidocaine patches, sodium bicarb. Reminded patient of conversation with [facility] PharmD regarding looking into pricing for OTC versions of some of the medications that are currently not being covered by insurance. Suggested patient works with providers about possible alternatives.     Patient reports ongoing bladder and abdominal pain. Abdominal pain--not passing stool, only liquid and having sharp, stabbing pains in abdomen. Patient plans to contact Dr. Brody for appointment to assess.    Recommendation Attempted to transfer patient to scheduling department to schedule appointment for Dr. Brody but line was down. Number provided and patient states she will call later.     Future appointments per patient report:   PT tomorrow, 11/29/18   Ortho 12/10/18 to follow up on hip   Patient waiting to hear back from another provider for shoulder or hip.     Plan " to follow up in 2 weeks or earlier if needed.    Escalation Protocol: Physician Office Visit Appointment

## 2018-11-29 ENCOUNTER — HOSPITAL ENCOUNTER (OUTPATIENT)
Dept: RADIOLOGY | Facility: MEDICAL CENTER | Age: 28
End: 2018-11-29
Attending: INTERNAL MEDICINE
Payer: MEDICARE

## 2018-11-29 ENCOUNTER — OFFICE VISIT (OUTPATIENT)
Dept: MEDICAL GROUP | Facility: MEDICAL CENTER | Age: 28
End: 2018-11-29
Payer: MEDICARE

## 2018-11-29 ENCOUNTER — HOSPITAL ENCOUNTER (OUTPATIENT)
Facility: MEDICAL CENTER | Age: 28
End: 2018-11-29
Attending: INTERNAL MEDICINE
Payer: MEDICARE

## 2018-11-29 ENCOUNTER — PATIENT OUTREACH (OUTPATIENT)
Dept: HEALTH INFORMATION MANAGEMENT | Facility: OTHER | Age: 28
End: 2018-11-29

## 2018-11-29 VITALS
DIASTOLIC BLOOD PRESSURE: 84 MMHG | OXYGEN SATURATION: 98 % | SYSTOLIC BLOOD PRESSURE: 126 MMHG | HEIGHT: 65 IN | TEMPERATURE: 97.8 F | HEART RATE: 78 BPM | RESPIRATION RATE: 16 BRPM | BODY MASS INDEX: 44.82 KG/M2 | WEIGHT: 269 LBS

## 2018-11-29 DIAGNOSIS — R14.0 ABDOMINAL DISTENSION: ICD-10-CM

## 2018-11-29 DIAGNOSIS — R30.0 DYSURIA: ICD-10-CM

## 2018-11-29 DIAGNOSIS — R19.7 DIARRHEA, UNSPECIFIED TYPE: ICD-10-CM

## 2018-11-29 LAB
APPEARANCE UR: CLEAR
BILIRUB UR STRIP-MCNC: ABNORMAL MG/DL
COLOR UR AUTO: YELLOW
GLUCOSE UR STRIP.AUTO-MCNC: ABNORMAL MG/DL
KETONES UR STRIP.AUTO-MCNC: ABNORMAL MG/DL
LEUKOCYTE ESTERASE UR QL STRIP.AUTO: ABNORMAL
NITRITE UR QL STRIP.AUTO: ABNORMAL
PH UR STRIP.AUTO: 6 [PH] (ref 5–8)
PROT UR QL STRIP: ABNORMAL MG/DL
RBC UR QL AUTO: ABNORMAL
SP GR UR STRIP.AUTO: 1.02
UROBILINOGEN UR STRIP-MCNC: 0.2 MG/DL

## 2018-11-29 PROCEDURE — 87086 URINE CULTURE/COLONY COUNT: CPT

## 2018-11-29 PROCEDURE — 74019 RADEX ABDOMEN 2 VIEWS: CPT

## 2018-11-29 PROCEDURE — 81002 URINALYSIS NONAUTO W/O SCOPE: CPT | Performed by: INTERNAL MEDICINE

## 2018-11-29 PROCEDURE — 99214 OFFICE O/P EST MOD 30 MIN: CPT | Performed by: INTERNAL MEDICINE

## 2018-11-29 NOTE — PROGRESS NOTES
Referral from CC KRYSTIAN Mao stating patient is having trouble with insurance claims for medications. Outbound call to pharmacy to discuss. Pharmacy states there are no issues with insurance coverage and claims are transmitting as usual. There was a refill that was too soon to cover for trazodone as she received a 90 day supply on 9/25 and insurance will cover on 12/9. Buspirone was out of refills and request was sent to Dr. Pa for authorization. OTC medications are not covered and patient has had to pay cash for sodium bicarbinate, vitamin D and lidocaine patches. Per [facility], all other prescription medications should go through for a $0 copay.     CC: Trista aMo

## 2018-11-29 NOTE — PROGRESS NOTES
TC from pt. Pt reports [employer] insurance no longer covering costs of certain medications. This is a new event within the past month. Pt listed several medications. Pt agreeable to return TC from [facility] Pharm Deperalta for additional information and follow up by this SW regarding costs and needs once clarified.     Staffed case with Pharm Deperalta.     Plan:  TC to pt at later time date

## 2018-11-29 NOTE — PROGRESS NOTES
CC: Abdominal distention, frequent urination, loose stools.    HPI:   Kristin presents today with the following.    1. Dysuria  She does report using the restroom more frequently some slight stomach discomfort but not anything classic for UTI.  Recent urinalysis showed non-clean-catch.    2. Diarrhea, unspecified type  She is reporting loose stools as well.  She denies any fevers or chills does have some discomfort and again noting some distention.    3. Abdominal distension  Again noting her stomach is getting larger in size.  She is again moving her bowels regularly in a watery fashion has not had a formed stool for 2 weeks.      Patient Active Problem List    Diagnosis Date Noted   • Cellulitis of left breast 08/03/2018     Priority: High   • Left leg weakness 07/14/2018     Priority: High   • Controlled type 2 diabetes mellitus without complication, without long-term current use of insulin ([facility]) 04/26/2017     Priority: High   • Sinus tachycardia 10/31/2013     Priority: High   • Chronic inflammatory arthritis 05/23/2016     Priority: Medium   • Morbid obesity with BMI of 45.0-49.9, adult ([facility]) 10/24/2017     Priority: Low   • Depression 10/28/2016     Priority: Low   • Schizophrenia ([facility]) 10/27/2016     Priority: Low   • Psychosis, schizophrenia, simple ([facility]) 09/29/2016     Priority: Low     Class: Chronic   • Acquired hypothyroidism 11/23/2015     Priority: Low   • PCOS (polycystic ovarian syndrome) 11/23/2015     Priority: Low   • Progressive focal motor weakness 06/28/2015     Priority: Low   • Fatty liver disease, nonalcoholic 01/19/2015     Priority: Low   • Anxiety 12/16/2014     Priority: Low   • Knee pain, right 02/13/2014     Priority: Low   • Neurogenic bladder 04/02/2011     Priority: Low   • Chronic UTI 09/18/2010     Priority: Low   • Borderline personality disorder in adult ([facility]) 09/18/2010     Priority: Low   • Asymptomatic bacteriuria 10/26/2018   • Palpitations 10/01/2018   • Chronic  respiratory failure with hypoxia, on home oxygen therapy ([facility]) 08/08/2018   • Transaminitis 08/08/2018   • TONYA (obstructive sleep apnea) 01/09/2018   • Functional diarrhea 01/05/2018   • Breast wound 11/06/2017   • Intractable episodic cluster headache 09/14/2017   • Rash 06/09/2017   • Hashimoto's encephalopathy 05/17/2017   • Fall at home 05/13/2017   • On home oxygen therapy 04/15/2017   • Chest pain 03/30/2017   • Weakness of right upper extremity 02/23/2017   • Chronic suprapubic catheter ([facility]) 02/16/2017   • Hypovitaminosis D 11/29/2016   • Chronic pain syndrome 10/27/2016   • Bowel and bladder incontinence 10/27/2016   • Galactorrhea 07/22/2016   • Elevated sedimentation rate 06/27/2016   • Weakness of both lower extremities 06/22/2016   • Vitamin D deficiency 05/21/2016   • Morbidly obese ([facility]) 03/07/2016   • Scoliosis 03/07/2016   • GERD (gastroesophageal reflux disease) 03/07/2016   • Peripheral neuropathy (CMS-HCC) 03/06/2016   • H/O prior ablation treatment 02/10/2016       Current Outpatient Prescriptions   Medication Sig Dispense Refill   • lidocaine (LIDODERM) 5 % Patch Apply 1 Patch to skin as directed every 24 hours. 10 Patch 2   • baclofen (LIORESAL) 10 MG Tab TAKE ONE TABLET BY MOUTH THREE TIMES DAILY 90 Tab 4   • pregabalin (LYRICA) 100 MG Cap Take 1 Cap by mouth 2 times a day for 90 days. 60 Cap 2   • cholestyramine (QUESTRAN,PREVALITE) 4 GM Pack Take 4 g by mouth every morning.     • melatonin 3 MG Tab Take 6 mg by mouth every bedtime.     • traZODone (DESYREL) 100 MG Tab Take 100 mg by mouth every bedtime.     • nystatin (MYCOSTATIN) 954286 UNIT/GM Cream topical cream Apply 0.0001 g to affected area(s) 2 times a day. 1 Tube 3   • furosemide (LASIX) 40 MG Tab Take 40 mg by mouth every day.     • cyanocobalamin ([employer]S VITAMIN B-12) 500 MCG tablet Take 500 mcg by mouth every day.     • busPIRone (BUSPAR) 10 MG Tab Take 0.5 Tabs by mouth 2 times a day. 60 Tab 2   • liraglutide (VICTOZA) 18 MG/3ML  "Solution Pen-injector injection Inject 1.8 mg as instructed every day.     • ziprasidone (GEODON) 80 MG Cap Take 80 mg by mouth 2 Times a Day.     • ivabradine (CORLANOR) 5 MG Tab tablet Take 1 Tab by mouth 2 times a day, with meals. 60 Tab 11   • sodium bicarbonate (SODIUM BICARBONATE) 650 MG Tab Take 650 mg by mouth 2 times a day.     • fluoxetine (PROZAC) 10 MG Cap TAKE ONE CAPSULE BY MOUTH ONCE A DAY 30 Cap 11   • aspirin EC (ECOTRIN) 81 MG Tablet Delayed Response Take 1 Tab by mouth every day. 30 Tab 6     No current facility-administered medications for this visit.          Allergies as of 11/29/2018 - Reviewed 11/29/2018   Allergen Reaction Noted   • Cefdinir Shortness of Breath and Itching 03/01/2016   • Depakote [divalproex sodium] Unspecified 06/14/2010   • Doxycycline Anaphylaxis and Vomiting 08/15/2012   • Abilify Unspecified 01/17/2013   • Amitriptyline Unspecified 10/31/2013   • Amoxicillin Rash 09/18/2010   • Ciprofloxacin Rash 12/17/2009   • Clindamycin Nausea 02/02/2011   • Ees [erythromycin] Vomiting and Nausea 08/28/2010   • Flagyl [metronidazole hcl] Unspecified 03/31/2011   • Flomax [tamsulosin hydrochloride] Swelling 09/24/2009   • Metformin Unspecified 07/23/2013   • Sulfa drugs Hives and Rash 09/18/2010   • Tape Rash 08/15/2012   • Vancomycin Itching 07/10/2016   • Wound dressing adhesive Hives 01/12/2018   • Cephalexin [keflex] Rash 01/01/2017   • Erythromycin Rash 03/30/2017   • Levofloxacin Unspecified 10/27/2016   • Metronidazole Rash 03/30/2017   • Valproic acid Rash 03/30/2017        ROS: Denies Chest pain, SOB, LE edema.    /84 (BP Location: Right arm, Patient Position: Sitting)   Pulse 78   Temp 36.6 °C (97.8 °F)   Resp 16   Ht 1.651 m (5' 5\")   Wt 122 kg (269 lb)   SpO2 98%   BMI 44.76 kg/m²     Physical Exam:  Gen:         Alert and oriented, No apparent distress.  Neck:        No Lymphadenopathy or Bruits.  Lungs:     Clear to auscultation bilaterally  CV:          " Regular rate and rhythm. No murmurs, rubs or gallops.       ABD:      Soft non tender, non distended. Normal active bowel sounds.  No  Hepatosplenomegaly, No pulsatile masses.              Ext:          No clubbing, cyanosis, edema.      Assessment and Plan.   28 y.o. female with the following issues.    1. Dysuria  Trace amount of esterase awaiting cultures.  - URINE CULTURE(NEW); Future  - POCT Urinalysis    2. Diarrhea, unspecified type  Have sent for stool cultures.  - CDIFF BY PCR; Future  - CULTURE STOOL; Future    3. Abdominal distension  Given the distention and diarrhea confused he might have an obstruction and only being able to pass loose stool around it.  Have written for x-ray.  - [id number] VIEWS; Future

## 2018-11-30 ENCOUNTER — PATIENT OUTREACH (OUTPATIENT)
Dept: HEALTH INFORMATION MANAGEMENT | Facility: OTHER | Age: 28
End: 2018-11-30

## 2018-11-30 NOTE — PROGRESS NOTES
Request from  KRYSTIAN Weston to update patient on prescription orders. Outbound call to patient to notify of why her medications are not filled by [facility] at this time. Patient unavailable. [facility] to return call [phone].

## 2018-11-30 NOTE — PROGRESS NOTES
Inbound call from patient returning . Discussed that OTC meds were not covered at last fill. Patient states they had been covered in the past. Outbound call to pharmacy to review prior claims. La at [facility] believes the OTC meds were not billed through secondary. States she will try to reverse current claim and rebill Kristin's secondary insurance and will f/u with patient. Provided my contact info to La should any changes in prescription order be required in order for future orders to be approved through insurance. Patient notified of above.

## 2018-12-02 LAB
BACTERIA UR CULT: NORMAL
SIGNIFICANT IND 70042: NORMAL
SITE SITE: NORMAL
SOURCE SOURCE: NORMAL

## 2018-12-04 ENCOUNTER — PATIENT OUTREACH (OUTPATIENT)
Dept: HEALTH INFORMATION MANAGEMENT | Facility: OTHER | Age: 28
End: 2018-12-04

## 2018-12-04 ENCOUNTER — TELEPHONE (OUTPATIENT)
Dept: HEALTH INFORMATION MANAGEMENT | Facility: OTHER | Age: 28
End: 2018-12-04

## 2018-12-04 NOTE — TELEPHONE ENCOUNTER
Pt anticipates adopting therapeutic animal and is requesting order to provide to apartment complex. Please have PCP complete order if deemed appropriate for pt.     Plan:  Message routed to PCP

## 2018-12-04 NOTE — PROGRESS NOTES
TC from pt. Pt reports not receiving RTC Access application in mail. SW confirmed address and updated mobile phone at pt's request. SW furthered conversation with pt regarding prior issues with medication costs; she reports [facility] Pharm Maribel was able to assist in lessening charges. Pt was able to afford medications.     Pt requested order for therapeutic animal.    Mailed RTC Access application    Plan:  TC to pt at later time/date  Message routed to PCP

## 2018-12-05 ENCOUNTER — HOSPITAL ENCOUNTER (OUTPATIENT)
Facility: MEDICAL CENTER | Age: 28
End: 2018-12-05
Attending: INTERNAL MEDICINE
Payer: MEDICARE

## 2018-12-05 DIAGNOSIS — R19.7 DIARRHEA, UNSPECIFIED TYPE: ICD-10-CM

## 2018-12-05 LAB
C DIFF DNA SPEC QL NAA+PROBE: NEGATIVE
C DIFF TOX GENS STL QL NAA+PROBE: NORMAL

## 2018-12-05 PROCEDURE — 87899 AGENT NOS ASSAY W/OPTIC: CPT

## 2018-12-05 PROCEDURE — 87046 STOOL CULTR AEROBIC BACT EA: CPT

## 2018-12-05 PROCEDURE — 87324 CLOSTRIDIUM AG IA: CPT

## 2018-12-05 PROCEDURE — 87045 FECES CULTURE AEROBIC BACT: CPT

## 2018-12-05 PROCEDURE — 87493 C DIFF AMPLIFIED PROBE: CPT

## 2018-12-06 LAB
E COLI SXT1+2 STL IA: NORMAL
SIGNIFICANT IND 70042: NORMAL
SITE SITE: NORMAL
SOURCE SOURCE: NORMAL

## 2018-12-07 ENCOUNTER — HOSPITAL ENCOUNTER (OUTPATIENT)
Dept: RADIOLOGY | Facility: MEDICAL CENTER | Age: 28
End: 2018-12-07
Attending: PHYSICIAN ASSISTANT
Payer: MEDICARE

## 2018-12-07 DIAGNOSIS — M12.852: ICD-10-CM

## 2018-12-07 DIAGNOSIS — M25.552 LEFT HIP PAIN: ICD-10-CM

## 2018-12-07 PROCEDURE — 73700 CT LOWER EXTREMITY W/O DYE: CPT | Mod: LT

## 2018-12-08 LAB
BACTERIA STL CULT: NORMAL
C DIFF TOX A+B STL QL IA: NEGATIVE
E COLI SXT1+2 STL IA: NORMAL
SIGNIFICANT IND 70042: NORMAL
SITE SITE: NORMAL
SOURCE SOURCE: NORMAL

## 2018-12-10 ENCOUNTER — PATIENT OUTREACH (OUTPATIENT)
Dept: HEALTH INFORMATION MANAGEMENT | Facility: OTHER | Age: 28
End: 2018-12-10

## 2018-12-12 RX ORDER — BUSPIRONE HYDROCHLORIDE 5 MG/1
5 TABLET ORAL 2 TIMES DAILY
Qty: 60 TAB | Refills: 5 | Status: SHIPPED | OUTPATIENT
Start: 2018-12-12 | End: 2019-06-18 | Stop reason: SDUPTHER

## 2018-12-14 ENCOUNTER — PATIENT OUTREACH (OUTPATIENT)
Dept: HEALTH INFORMATION MANAGEMENT | Facility: OTHER | Age: 28
End: 2018-12-14

## 2018-12-14 ASSESSMENT — ENCOUNTER SYMPTOMS
DIABETIC SYMPTOM PROGRESSION: STABLE
DIABETIC ASSOCIATED SYMPTOMS: 1

## 2018-12-14 NOTE — PROGRESS NOTES
Situation, Background, Assessment, Recommendation     Situation    Incoming call from patient returning call from earlier this week.     Patient states she is not feeling well. Reports continued hip, abdominal, and back pain and 3 days of heavy, dark vaginal bleeding.   Background       Patient states she called for an appointment with OB/GYN Dr. Pisano but did not give office details about current situation. Appointment not available for 6-8 weeks. Patient states she has not had a period for approximately 4 years and never had associated menstrual cramps.    Assessment    Patient is concerned that a cyst was found on her CT--patient wondering if it burst if it would cause bleeding like she is experiencing.       CT-HIP W/O PLUS RECONS LEFT  Narrative: 12/7/2018 11:33 AM    HISTORY/REASON FOR EXAM: Left hip pain, arthritis, evaluating for stress fracture    TECHNIQUE/EXAM DESCRIPTION AND NUMBER OF VIEWS:  CT scan of the LEFT lower extremity without contrast and including reconstructions.    Thin-section noncontrast helical images were obtained. Coronal and sagittal reconstructions were generated from the axial images.    Up to date radiation dose reduction adjustments have been utilized to meet ALARA standards for radiation dose reduction.    COMPARISON: 1 view pelvis 8/17/2018. Left hip series 7/1/2017    FINDINGS:  The bony pelvis is intact.    Both hip joint are normal in appearance.    There are no fracture or malalignment.    The sacroiliac joints are normal in appearance.    Lumbar spine: There is anterior hardware at L4-5. There is bilateral L5 spondylolysis. There is multilevel facet arthropathy.    Abdomen: There is a 4.1 cm right adnexal cyst. This is most likely follicle or functional cyst.    In the gluteal soft tissues there is a device present extending to the left posterior pelvis    The visualized bowel is normal.  Impression: 1.  No stress fracture identified within limitations of CT.    2.  L4-5  anterior fusion. Bilateral L5 spondylolysis multilevel facet arthropathy    3.  4.1 cm right adnexal cyst which is most likely follicle or functional cyst    4.  Device present in the right gluteal soft tissues with tip extending to the left posterior pelvis    Patient states her hip is still hurting a lot; per patient, [facility] physician thinks it might be more back than hip related so she has an appointment for back specialist in February.     Patient states she missed her therapy yesterday 2/2 not feeling well. She will reschedule.     Patient states her blood sugars have been in the 110s. Patient's family does not like the food provided from the food pantry with her [employer] prescription and told her not to go back. She tried to encourage family that it gave them all an opportunity to learn how to cook with new food items. Patient states she will go back eventually.     Applications for new housing and RTC access still need to be completed by patient.    Recommendation --Recommended patient call back to OB/GYN office and give details about vaginal bleeding to try to get an earlier appointment.   --CCM to follow up with patient in 2 weeks or earlier, if needed.       Note routed to Dr. Pisano   Escalation Protocol: Physician Office Visit Appointment

## 2018-12-17 ENCOUNTER — PATIENT OUTREACH (OUTPATIENT)
Dept: HEALTH INFORMATION MANAGEMENT | Facility: OTHER | Age: 28
End: 2018-12-17

## 2018-12-17 NOTE — PROGRESS NOTES
VM received from pt requesting return TC.    TC to pt. Pt requesting assistance with RTC Access applications. SW reviewed applications and clarified question pt expressed confusion on. SW educated pt to schedule RTC Access interview and request transportation via RTC Access for it.     Plan:  TC to pt at later time/date

## 2018-12-24 ENCOUNTER — OFFICE VISIT (OUTPATIENT)
Dept: MEDICAL GROUP | Facility: MEDICAL CENTER | Age: 28
End: 2018-12-24
Payer: MEDICARE

## 2018-12-24 VITALS
HEIGHT: 65 IN | WEIGHT: 266 LBS | OXYGEN SATURATION: 97 % | HEART RATE: 69 BPM | BODY MASS INDEX: 44.32 KG/M2 | TEMPERATURE: 96.5 F | SYSTOLIC BLOOD PRESSURE: 112 MMHG | DIASTOLIC BLOOD PRESSURE: 70 MMHG | RESPIRATION RATE: 16 BRPM

## 2018-12-24 DIAGNOSIS — N39.0 CHRONIC UTI: Chronic | ICD-10-CM

## 2018-12-24 DIAGNOSIS — R53.83 FATIGUE, UNSPECIFIED TYPE: ICD-10-CM

## 2018-12-24 DIAGNOSIS — E11.9 CONTROLLED TYPE 2 DIABETES MELLITUS WITHOUT COMPLICATION, WITHOUT LONG-TERM CURRENT USE OF INSULIN (HCC): ICD-10-CM

## 2018-12-24 PROCEDURE — 99213 OFFICE O/P EST LOW 20 MIN: CPT | Performed by: INTERNAL MEDICINE

## 2018-12-24 NOTE — PROGRESS NOTES
CC: Urinary retention    HPI:   Kristin presents today with the following.    1.  Urinary retention.  Presents reporting she is only had a few dribbles of urine over the last 24 hours.  She denies any pain no pain no fevers chills or flank pain.  She is tried on multiple occasion again only reports 10 drops of urine.  Overall she feels fine however.  She has recently gone up on some of her medications.  Denies any blood again no pain    Patient Active Problem List    Diagnosis Date Noted   • Cellulitis of left breast 08/03/2018     Priority: High   • Left leg weakness 07/14/2018     Priority: High   • Controlled type 2 diabetes mellitus without complication, without long-term current use of insulin ([facility]) 04/26/2017     Priority: High   • Sinus tachycardia 10/31/2013     Priority: High   • Chronic inflammatory arthritis 05/23/2016     Priority: Medium   • Morbid obesity with BMI of 45.0-49.9, adult ([facility]) 10/24/2017     Priority: Low   • Depression 10/28/2016     Priority: Low   • Schizophrenia ([facility]) 10/27/2016     Priority: Low   • Psychosis, schizophrenia, simple ([facility]) 09/29/2016     Priority: Low     Class: Chronic   • Acquired hypothyroidism 11/23/2015     Priority: Low   • PCOS (polycystic ovarian syndrome) 11/23/2015     Priority: Low   • Progressive focal motor weakness 06/28/2015     Priority: Low   • Fatty liver disease, nonalcoholic 01/19/2015     Priority: Low   • Anxiety 12/16/2014     Priority: Low   • Knee pain, right 02/13/2014     Priority: Low   • Neurogenic bladder 04/02/2011     Priority: Low   • Chronic UTI 09/18/2010     Priority: Low   • Borderline personality disorder in adult ([facility]) 09/18/2010     Priority: Low   • Asymptomatic bacteriuria 10/26/2018   • Palpitations 10/01/2018   • Chronic respiratory failure with hypoxia, on home oxygen therapy ([facility]) 08/08/2018   • Transaminitis 08/08/2018   • TONYA (obstructive sleep apnea) 01/09/2018   • Functional diarrhea 01/05/2018   • Breast wound  11/06/2017   • Intractable episodic cluster headache 09/14/2017   • Rash 06/09/2017   • Hashimoto's encephalopathy 05/17/2017   • Fall at home 05/13/2017   • On home oxygen therapy 04/15/2017   • Chest pain 03/30/2017   • Weakness of right upper extremity 02/23/2017   • Chronic suprapubic catheter (HCC) 02/16/2017   • Hypovitaminosis D 11/29/2016   • Chronic pain syndrome 10/27/2016   • Bowel and bladder incontinence 10/27/2016   • Galactorrhea 07/22/2016   • Elevated sedimentation rate 06/27/2016   • Weakness of both lower extremities 06/22/2016   • Vitamin D deficiency 05/21/2016   • Morbidly obese (HCC) 03/07/2016   • Scoliosis 03/07/2016   • GERD (gastroesophageal reflux disease) 03/07/2016   • Peripheral neuropathy ([facility]) 03/06/2016   • H/O prior ablation treatment 02/10/2016       Current Outpatient Prescriptions   Medication Sig Dispense Refill   • LYRICA 150 MG Cap      • busPIRone (BUSPAR) 5 MG tablet Take 1 Tab by mouth 2 times a day. 60 Tab 5   • lidocaine (LIDODERM) 5 % Patch Apply 1 Patch to skin as directed every 24 hours. 10 Patch 2   • baclofen (LIORESAL) 10 MG Tab TAKE ONE TABLET BY MOUTH THREE TIMES DAILY 90 Tab 4   • cholestyramine (QUESTRAN,PREVALITE) 4 GM Pack Take 4 g by mouth every morning.     • melatonin 3 MG Tab Take 6 mg by mouth every bedtime.     • traZODone (DESYREL) 100 MG Tab Take 100 mg by mouth every bedtime.     • nystatin (MYCOSTATIN) 316956 UNIT/GM Cream topical cream Apply 0.0001 g to affected area(s) 2 times a day. 1 Tube 3   • furosemide (LASIX) 40 MG Tab Take 40 mg by mouth every day.     • cyanocobalamin ([employer] VITAMIN B-12) 500 MCG tablet Take 500 mcg by mouth every day.     • busPIRone (BUSPAR) 10 MG Tab Take 0.5 Tabs by mouth 2 times a day. 60 Tab 2   • liraglutide (VICTOZA) 18 MG/3ML Solution Pen-injector injection Inject 1.8 mg as instructed every day.     • ziprasidone (GEODON) 80 MG Cap Take 80 mg by mouth 2 Times a Day.     • ivabradine (CORLANOR) 5 MG Tab tablet  "Take 1 Tab by mouth 2 times a day, with meals. 60 Tab 11   • sodium bicarbonate (SODIUM BICARBONATE) 650 MG Tab Take 650 mg by mouth 2 times a day.     • fluoxetine (PROZAC) 10 MG Cap TAKE ONE CAPSULE BY MOUTH ONCE A DAY 30 Cap 11   • aspirin EC (ECOTRIN) 81 MG Tablet Delayed Response Take 1 Tab by mouth every day. 30 Tab 6     No current facility-administered medications for this visit.          Allergies as of 12/24/2018 - Reviewed 12/24/2018   Allergen Reaction Noted   • Cefdinir Shortness of Breath and Itching 03/01/2016   • Depakote [divalproex sodium] Unspecified 06/14/2010   • Doxycycline Anaphylaxis and Vomiting 08/15/2012   • Abilify Unspecified 01/17/2013   • Amitriptyline Unspecified 10/31/2013   • Amoxicillin Rash 09/18/2010   • Ciprofloxacin Rash 12/17/2009   • Clindamycin Nausea 02/02/2011   • Ees [erythromycin] Vomiting and Nausea 08/28/2010   • Flagyl [metronidazole hcl] Unspecified 03/31/2011   • Flomax [tamsulosin hydrochloride] Swelling 09/24/2009   • Metformin Unspecified 07/23/2013   • Sulfa drugs Hives and Rash 09/18/2010   • Tape Rash 08/15/2012   • Vancomycin Itching 07/10/2016   • Wound dressing adhesive Hives 01/12/2018   • Cephalexin [keflex] Rash 01/01/2017   • Erythromycin Rash 03/30/2017   • Levofloxacin Unspecified 10/27/2016   • Metronidazole Rash 03/30/2017   • Valproic acid Rash 03/30/2017        ROS: Denies Chest pain, SOB, LE edema.    /70 (BP Location: Left arm, Patient Position: Sitting, BP Cuff Size: Adult)   Pulse 69   Temp 35.8 °C (96.5 °F) (Temporal)   Resp 16   Ht 1.651 m (5' 5\")   Wt 120.7 kg (266 lb) Comment: Stated  SpO2 97% Comment: 2 LPM  BMI 44.26 kg/m²     Physical Exam:  Gen:         Alert and oriented, No apparent distress.  Neck:        No Lymphadenopathy or Bruits.  Lungs:     Clear to auscultation bilaterally  CV:          Regular rate and rhythm. No murmurs, rubs or gallops.               Ext:          No clubbing, cyanosis, edema.      Assessment " and Plan.   28 y.o. female with the following issues.    1.  Urinary retention  Discussion today that she likely needs to go to emergency room for catheterization.  She plans on going home and coming back if she is not urinate in the next few hours.  No obvious symptoms to get to etiology.

## 2018-12-24 NOTE — LETTER
December 24, 2018        Kristin Chahaln Balderrama        Above would benefit from  animal.                       Torres Brody MD

## 2018-12-27 ENCOUNTER — PATIENT OUTREACH (OUTPATIENT)
Dept: HEALTH INFORMATION MANAGEMENT | Facility: OTHER | Age: 28
End: 2018-12-27

## 2018-12-27 ASSESSMENT — ENCOUNTER SYMPTOMS
DIABETIC SYMPTOM PROGRESSION: STABLE
DIABETIC ASSOCIATED SYMPTOMS: 1

## 2018-12-27 NOTE — PROGRESS NOTES
"SBAR Documentation: Situation, Background, Assessment, Recommendation     Situation    Routine outgoing call to patient to follow up on her concerns from our previous call. Patient states she is doing much better.    Background       At last call, patient c/o heavy menstrual bleeding and abdominal/back pain.    Assessment    Patient states she followed up with OB/GYN for her heavy menstrual bleeding who told her to expect heavy bleeding for the first few months if her cycle was restarting after a few years.     Patient c/o urinary retention earlier this week and nearly went to the ED for catheterization but when she got home her depends were full of urine with some \"grit.\" Patient thinks \"there was a stone in there.\" Patient reports regular voiding since.     Patient reports she has been doing mostly well with DM diet, but did have a few bites of frosting this am when family was baking. She is doing her best to avoid frozen and prepared meals but is reliant upon her Grandma for most of her meals. Patient reports her weight has been stable--she states she would like to lose weight but is happy that she has not gained any either.     Overall patient in better spirits than last few calls.   Recommendation [facility] RN to follow up in 3-4 weeks, or sooner, if needed.   Escalation Protocol: No Escalation Needed    "

## 2019-01-03 ENCOUNTER — OFFICE VISIT (OUTPATIENT)
Dept: MEDICAL GROUP | Facility: MEDICAL CENTER | Age: 29
End: 2019-01-03
Payer: MEDICARE

## 2019-01-03 VITALS
HEIGHT: 65 IN | OXYGEN SATURATION: 96 % | TEMPERATURE: 98.4 F | DIASTOLIC BLOOD PRESSURE: 80 MMHG | SYSTOLIC BLOOD PRESSURE: 112 MMHG | HEART RATE: 94 BPM | WEIGHT: 264 LBS | BODY MASS INDEX: 43.99 KG/M2

## 2019-01-03 DIAGNOSIS — K21.00 GASTROESOPHAGEAL REFLUX DISEASE WITH ESOPHAGITIS: ICD-10-CM

## 2019-01-03 PROCEDURE — 99213 OFFICE O/P EST LOW 20 MIN: CPT | Performed by: INTERNAL MEDICINE

## 2019-01-03 RX ORDER — OMEPRAZOLE 20 MG/1
20 CAPSULE, DELAYED RELEASE ORAL DAILY
Qty: 90 CAP | Refills: 3 | Status: SHIPPED | OUTPATIENT
Start: 2019-01-03 | End: 2019-05-27

## 2019-01-03 NOTE — PROGRESS NOTES
CC: Reflux.    HPI:   Kristin presents today with the following.    1. Gastroesophageal reflux disease with esophagitis  Reports of episodes about breath recently seeing dentist without any major issues.  She does report recurrent ascitic type feelings in her chest as well as occasional difficulty swallowing for which she is followed by gastroenterology.  She has stopped her PPI and noticed symptoms has occurred since that time.  Denies any blood in her stool or dark tarry stool.      Patient Active Problem List    Diagnosis Date Noted   • Cellulitis of left breast 08/03/2018     Priority: High   • Left leg weakness 07/14/2018     Priority: High   • Controlled type 2 diabetes mellitus without complication, without long-term current use of insulin ([facility]) 04/26/2017     Priority: High   • Sinus tachycardia 10/31/2013     Priority: High   • Chronic inflammatory arthritis 05/23/2016     Priority: Medium   • Morbid obesity with BMI of 45.0-49.9, adult ([facility]) 10/24/2017     Priority: Low   • Depression 10/28/2016     Priority: Low   • Schizophrenia ([facility]) 10/27/2016     Priority: Low   • Psychosis, schizophrenia, simple ([facility]) 09/29/2016     Priority: Low     Class: Chronic   • Acquired hypothyroidism 11/23/2015     Priority: Low   • PCOS (polycystic ovarian syndrome) 11/23/2015     Priority: Low   • Progressive focal motor weakness 06/28/2015     Priority: Low   • Fatty liver disease, nonalcoholic 01/19/2015     Priority: Low   • Anxiety 12/16/2014     Priority: Low   • Knee pain, right 02/13/2014     Priority: Low   • Neurogenic bladder 04/02/2011     Priority: Low   • Chronic UTI 09/18/2010     Priority: Low   • Borderline personality disorder in adult ([facility]) 09/18/2010     Priority: Low   • Asymptomatic bacteriuria 10/26/2018   • Palpitations 10/01/2018   • Chronic respiratory failure with hypoxia, on home oxygen therapy ([facility]) 08/08/2018   • Transaminitis 08/08/2018   • TONYA (obstructive sleep apnea) 01/09/2018   •  Functional diarrhea 01/05/2018   • Breast wound 11/06/2017   • Intractable episodic cluster headache 09/14/2017   • Rash 06/09/2017   • Hashimoto's encephalopathy 05/17/2017   • Fall at home 05/13/2017   • On home oxygen therapy 04/15/2017   • Chest pain 03/30/2017   • Weakness of right upper extremity 02/23/2017   • Chronic suprapubic catheter (HCC) 02/16/2017   • Hypovitaminosis D 11/29/2016   • Chronic pain syndrome 10/27/2016   • Bowel and bladder incontinence 10/27/2016   • Galactorrhea 07/22/2016   • Elevated sedimentation rate 06/27/2016   • Weakness of both lower extremities 06/22/2016   • Vitamin D deficiency 05/21/2016   • Morbidly obese ([facility]) 03/07/2016   • Scoliosis 03/07/2016   • GERD (gastroesophageal reflux disease) 03/07/2016   • Peripheral neuropathy ([facility]) 03/06/2016   • H/O prior ablation treatment 02/10/2016       Current Outpatient Prescriptions   Medication Sig Dispense Refill   • omeprazole (PRILOSEC) 20 MG delayed-release capsule Take 1 Cap by mouth every day. 90 Cap 3   • LYRICA 150 MG Cap      • busPIRone (BUSPAR) 5 MG tablet Take 1 Tab by mouth 2 times a day. 60 Tab 5   • lidocaine (LIDODERM) 5 % Patch Apply 1 Patch to skin as directed every 24 hours. 10 Patch 2   • baclofen (LIORESAL) 10 MG Tab TAKE ONE TABLET BY MOUTH THREE TIMES DAILY 90 Tab 4   • cholestyramine (QUESTRAN,PREVALITE) 4 GM Pack Take 4 g by mouth every morning.     • melatonin 3 MG Tab Take 6 mg by mouth every bedtime.     • traZODone (DESYREL) 100 MG Tab Take 100 mg by mouth every bedtime.     • nystatin (MYCOSTATIN) 239820 UNIT/GM Cream topical cream Apply 0.0001 g to affected area(s) 2 times a day. 1 Tube 3   • furosemide (LASIX) 40 MG Tab Take 40 mg by mouth every day.     • cyanocobalamin ([employer] VITAMIN B-12) 500 MCG tablet Take 500 mcg by mouth every day.     • busPIRone (BUSPAR) 10 MG Tab Take 0.5 Tabs by mouth 2 times a day. 60 Tab 2   • liraglutide (VICTOZA) 18 MG/3ML Solution Pen-injector injection Inject 1.8 mg  "as instructed every day.     • ziprasidone (GEODON) 80 MG Cap Take 80 mg by mouth 2 Times a Day.     • ivabradine (CORLANOR) 5 MG Tab tablet Take 1 Tab by mouth 2 times a day, with meals. 60 Tab 11   • sodium bicarbonate (SODIUM BICARBONATE) 650 MG Tab Take 650 mg by mouth 2 times a day.     • fluoxetine (PROZAC) 10 MG Cap TAKE ONE CAPSULE BY MOUTH ONCE A DAY 30 Cap 11   • aspirin EC (ECOTRIN) 81 MG Tablet Delayed Response Take 1 Tab by mouth every day. 30 Tab 6     No current facility-administered medications for this visit.          Allergies as of 01/03/2019 - Reviewed 01/03/2019   Allergen Reaction Noted   • Cefdinir Shortness of Breath and Itching 03/01/2016   • Depakote [divalproex sodium] Unspecified 06/14/2010   • Doxycycline Anaphylaxis and Vomiting 08/15/2012   • Abilify Unspecified 01/17/2013   • Amitriptyline Unspecified 10/31/2013   • Amoxicillin Rash 09/18/2010   • Ciprofloxacin Rash 12/17/2009   • Clindamycin Nausea 02/02/2011   • Ees [erythromycin] Vomiting and Nausea 08/28/2010   • Flagyl [metronidazole hcl] Unspecified 03/31/2011   • Flomax [tamsulosin hydrochloride] Swelling 09/24/2009   • Metformin Unspecified 07/23/2013   • Sulfa drugs Hives and Rash 09/18/2010   • Tape Rash 08/15/2012   • Vancomycin Itching 07/10/2016   • Wound dressing adhesive Hives 01/12/2018   • Cephalexin [keflex] Rash 01/01/2017   • Erythromycin Rash 03/30/2017   • Levofloxacin Unspecified 10/27/2016   • Metronidazole Rash 03/30/2017   • Valproic acid Rash 03/30/2017        ROS: Denies Chest pain, SOB, LE edema.    /80 (BP Location: Right arm, Patient Position: Sitting)   Pulse 94   Temp 36.9 °C (98.4 °F)   Ht 1.651 m (5' 5\")   Wt 119.7 kg (264 lb)   SpO2 96%   BMI 43.93 kg/m²     Physical Exam:  Gen:         Alert and oriented, No apparent distress.  Neck:        No Lymphadenopathy or Bruits.  Lungs:     Clear to auscultation bilaterally  CV:          Regular rate and rhythm. No murmurs, rubs or gallops.      "          Ext:          No clubbing, cyanosis, edema.      Assessment and Plan.   28 y.o. female with the following issues.    1. Gastroesophageal reflux disease with esophagitis  Starting back on Prilosec cautioned about side effects follow-up if not improving symptoms  - omeprazole (PRILOSEC) 20 MG delayed-release capsule; Take 1 Cap by mouth every day.  Dispense: 90 Cap; Refill: 3

## 2019-01-07 ENCOUNTER — PATIENT OUTREACH (OUTPATIENT)
Dept: HEALTH INFORMATION MANAGEMENT | Facility: OTHER | Age: 29
End: 2019-01-07

## 2019-01-07 ENCOUNTER — OFFICE VISIT (OUTPATIENT)
Dept: BEHAVIORAL HEALTH | Facility: CLINIC | Age: 29
End: 2019-01-07
Payer: MEDICARE

## 2019-01-07 VITALS
HEART RATE: 90 BPM | SYSTOLIC BLOOD PRESSURE: 114 MMHG | BODY MASS INDEX: 43.65 KG/M2 | HEIGHT: 65 IN | WEIGHT: 262 LBS | DIASTOLIC BLOOD PRESSURE: 82 MMHG | RESPIRATION RATE: 16 BRPM

## 2019-01-07 DIAGNOSIS — G47.00 INSOMNIA, UNSPECIFIED TYPE: ICD-10-CM

## 2019-01-07 DIAGNOSIS — F25.1 SCHIZOAFFECTIVE DISORDER, DEPRESSIVE TYPE (HCC): ICD-10-CM

## 2019-01-07 PROCEDURE — 90833 PSYTX W PT W E/M 30 MIN: CPT | Performed by: PSYCHIATRY & NEUROLOGY

## 2019-01-07 PROCEDURE — 99213 OFFICE O/P EST LOW 20 MIN: CPT | Performed by: PSYCHIATRY & NEUROLOGY

## 2019-01-07 RX ORDER — FLUOXETINE 10 MG/1
CAPSULE ORAL
Qty: 30 CAP | Refills: 2 | Status: SHIPPED | OUTPATIENT
Start: 2019-01-07 | End: 2019-04-04 | Stop reason: SDUPTHER

## 2019-01-07 RX ORDER — ZIPRASIDONE HYDROCHLORIDE 80 MG/1
80 CAPSULE ORAL 2 TIMES DAILY
Qty: 60 CAP | Refills: 2 | Status: SHIPPED | OUTPATIENT
Start: 2019-01-07 | End: 2019-02-23 | Stop reason: SDUPTHER

## 2019-01-07 ASSESSMENT — PATIENT HEALTH QUESTIONNAIRE - PHQ9
SUM OF ALL RESPONSES TO PHQ QUESTIONS 1-9: 8
CLINICAL INTERPRETATION OF PHQ2 SCORE: 3
5. POOR APPETITE OR OVEREATING: 1 - SEVERAL DAYS

## 2019-01-07 NOTE — PROGRESS NOTES
VM received from pt.     TC to pt. Pt inquiring about [place] form received for Medicaid/Food stamps.It's requesting expenses, income (bank statements) and self-employment information. SW educated pt to obtain copy of SS award letter, current bank statement, current credit card statement and letter from grandparent of rent/utilities cost. Pt recommended to visit Welfare office in-person to address elf-employment as pt has never been self employed.    Plan:  TC to pt at later time/date

## 2019-01-08 NOTE — BH THERAPY
[facility]  PSYCHIATRIC FOLLOW-UP NOTE    Name: Kristin Balderrama  MRN: [id number]  : 1990  Age: 28 y.o.  Date of assessment: 2019  PCP: Torres Brody M.D.  Persons in attendance: Patient  Total face-to-face time: 30 minutes    REASON FOR VISIT/CHIEF COMPLAINT (as stated by Patient):  Kristin Balderrama is a 28 y.o., White female, attending follow-up appointment for   Chief Complaint   Patient presents with   • Medication Management     The patient is seen today for follow up on her depression anxiety, insomnia. and pesonality disorder.   .      HISTORY OF PRESENT ILLNESS:    This is a 27 yo female, single, seen today for follow up after 11 months. The patient made this appointment today because she has been taking geodon, prozac, buspar and trazodone. The patient has been on geodon for many years for her psychosis and borderline personality disorder. The patient has started on prozac then buspar was added. The patient came in today and she was not using her walker. The has lrightfoot drop and she has brace. The patient has been seeing her kidney doctor. The patient has been sleeping well and she denied psychosis.       PSYCHOSOCIAL CHANGES SINCE PREVIOUS CONTACT:  The patients depression and anxiety improved. The patient has been sleeping well on trazodone.  The patient has been taking geodon 80 mg bid, fluoxetine 10 mg one a day, buspar 5 mg bid, and trazodone for sleep.    RESPONSE TO TREATMENT:  The patient is taking geodon 80 mg bid, fluoxetine 10 mg one a day, buspar 5 mg bid, trazodone prn sleep.    MEDICATION SIDE EFFECTS:  Weight gain    REVIEW OF SYSTEMS:        Constitutional positive - foot drop, obesity   Eyes negative   Ears/Nose/Mouth/Throat negative   Cardiovascular positive - sinus tachycardia, hypertension   Respiratory positive - obstructive sleep apnea.   Gastrointestinal positive - fatty liver   Genitourinary positive - polycystic ovarian syndrome   Muscular  "negative   Integumentary positive - chronic inflammatory arthritis   Neurological positive - neuropathy   Endocrine positive - diabetes, hypothyroidism   Hematologic/Lymphatic negative       PSYCHIATRIC EXAMINATION/MENTAL STATUS  /82   Pulse 90   Resp 16   Ht 1.651 m (5' 5\")   Wt 118.8 kg (262 lb)   BMI 43.60 kg/m²   Participation: Active verbal participation, Attentive and Engaged  Grooming:Neat  Orientation: Alert and Fully Oriented  Eye contact: Good  Behavior:Calm   Mood: Anxious  Affect: Flexible and Full range  Thought process: Logical and Goal-directed  Thought content:  Within normal limits  Speech: Rate within normal limits and Volume within normal limits  Perception:  Within normal limits  Memory:  No gross evidence of memory deficits  Insight: Good  Judgment: Good  Family/couple interaction observations:   Other:    Current risk:    Suicide: Low   Homicide: Low   Self-harm: Low  Relapse: Low  Other:   Crisis Safety Plan reviewed?Yes  If evidence of imminent risk is present, intervention/plan:    Medical Records/Labs/Diagnostic Tests Reviewed: yes.    Medical Records/Labs/Diagnostic Tests Ordered: none.    DIAGNOSTIC IMPRESSION(S):  1. Schizoaffective disorder, depressive type (HCC)    2. Insomnia, unspecified type           ASSESSMENT AND PLAN:  1. Schizoaffective disorder, depressed type.  2. Insomnia.  3. Personality disorder.  Geodon 80 mg bid # 60 refills five.  Fluoxetine 10 mg one a day # 30 refills five.  Buspar 5 mg bid # 60 refills five.  Trazodone prn sleep.    4. Follow up in three months.  The patient has medicaid but not covered through mental health.    17. minutes were spent in psychotherapy.  (If greater than 16 minutes, add [id number] code)   Topics addressed in psychotherapy include:  Psycho education and cognitive clarifications.  We discussed her negative automatic thoughts and helped her to process it.  Educated her about meditation and relaxation method.  Ronny Burnette, " M.D.

## 2019-01-14 ENCOUNTER — DOCUMENTATION (OUTPATIENT)
Dept: BEHAVIORAL HEALTH | Facility: CLINIC | Age: 29
End: 2019-01-14

## 2019-01-16 ENCOUNTER — OFFICE VISIT (OUTPATIENT)
Dept: MEDICAL GROUP | Facility: MEDICAL CENTER | Age: 29
End: 2019-01-16
Payer: MEDICARE

## 2019-01-16 VITALS
DIASTOLIC BLOOD PRESSURE: 72 MMHG | SYSTOLIC BLOOD PRESSURE: 124 MMHG | HEART RATE: 95 BPM | BODY MASS INDEX: 44.15 KG/M2 | TEMPERATURE: 98.3 F | OXYGEN SATURATION: 97 % | WEIGHT: 265 LBS | HEIGHT: 65 IN

## 2019-01-16 DIAGNOSIS — H66.90 EAR INFECTION: ICD-10-CM

## 2019-01-16 PROCEDURE — 99213 OFFICE O/P EST LOW 20 MIN: CPT | Performed by: INTERNAL MEDICINE

## 2019-01-16 RX ORDER — AZITHROMYCIN 250 MG/1
250 TABLET, FILM COATED ORAL DAILY
Qty: 6 TAB | Refills: 0 | Status: SHIPPED | OUTPATIENT
Start: 2019-01-16 | End: 2019-01-21

## 2019-01-16 NOTE — PROGRESS NOTES
CC: Earache    HPI:   Kristin presents today with the following.    1.  Ear pain  Presents complaining of 4 days of right-sided ear pain.  It is affecting her jaw slightly.  She did see her dentist who said it was not related to any type of dental abscess.  She has no fevers or chills pain is worsening with time.  She reports feeling warm but no true fevers.  No nausea or vomiting no cough no other localizing infectious symptoms      Patient Active Problem List    Diagnosis Date Noted   • Cellulitis of left breast 08/03/2018     Priority: High   • Left leg weakness 07/14/2018     Priority: High   • Controlled type 2 diabetes mellitus without complication, without long-term current use of insulin ([facility]) 04/26/2017     Priority: High   • Sinus tachycardia 10/31/2013     Priority: High   • Chronic inflammatory arthritis 05/23/2016     Priority: Medium   • Morbid obesity with BMI of 45.0-49.9, adult ([facility]) 10/24/2017     Priority: Low   • Depression 10/28/2016     Priority: Low   • Schizophrenia ([facility]) 10/27/2016     Priority: Low   • Psychosis, schizophrenia, simple ([facility]) 09/29/2016     Priority: Low     Class: Chronic   • Acquired hypothyroidism 11/23/2015     Priority: Low   • PCOS (polycystic ovarian syndrome) 11/23/2015     Priority: Low   • Progressive focal motor weakness 06/28/2015     Priority: Low   • Fatty liver disease, nonalcoholic 01/19/2015     Priority: Low   • Anxiety 12/16/2014     Priority: Low   • Knee pain, right 02/13/2014     Priority: Low   • Neurogenic bladder 04/02/2011     Priority: Low   • Chronic UTI 09/18/2010     Priority: Low   • Borderline personality disorder in adult ([facility]) 09/18/2010     Priority: Low   • Asymptomatic bacteriuria 10/26/2018   • Palpitations 10/01/2018   • Chronic respiratory failure with hypoxia, on home oxygen therapy ([facility]) 08/08/2018   • Transaminitis 08/08/2018   • TONYA (obstructive sleep apnea) 01/09/2018   • Functional diarrhea 01/05/2018   • Breast wound 11/06/2017    • Intractable episodic cluster headache 09/14/2017   • Rash 06/09/2017   • Hashimoto's encephalopathy 05/17/2017   • Fall at home 05/13/2017   • On home oxygen therapy 04/15/2017   • Chest pain 03/30/2017   • Weakness of right upper extremity 02/23/2017   • Chronic suprapubic catheter ([facility]) 02/16/2017   • Hypovitaminosis D 11/29/2016   • Chronic pain syndrome 10/27/2016   • Bowel and bladder incontinence 10/27/2016   • Galactorrhea 07/22/2016   • Elevated sedimentation rate 06/27/2016   • Weakness of both lower extremities 06/22/2016   • Vitamin D deficiency 05/21/2016   • Morbidly obese ([facility]) 03/07/2016   • Scoliosis 03/07/2016   • GERD (gastroesophageal reflux disease) 03/07/2016   • Peripheral neuropathy ([facility]) 03/06/2016   • H/O prior ablation treatment 02/10/2016       Current Outpatient Prescriptions   Medication Sig Dispense Refill   • LYRICA 300 MG capsule      • azithromycin (ZITHROMAX Z-ASHLEY) 250 MG Tab Take 1 Tab by mouth every day for 5 days. Take 2 tabs on day 1 6 Tab 0   • ziprasidone (GEODON) 80 MG Cap Take 1 Cap by mouth 2 Times a Day. 60 Cap 2   • FLUoxetine (PROZAC) 10 MG Cap TAKE ONE CAPSULE BY MOUTH ONCE A DAY 30 Cap 2   • omeprazole (PRILOSEC) 20 MG delayed-release capsule Take 1 Cap by mouth every day. 90 Cap 3   • busPIRone (BUSPAR) 5 MG tablet Take 1 Tab by mouth 2 times a day. 60 Tab 5   • lidocaine (LIDODERM) 5 % Patch Apply 1 Patch to skin as directed every 24 hours. 10 Patch 2   • baclofen (LIORESAL) 10 MG Tab TAKE ONE TABLET BY MOUTH THREE TIMES DAILY 90 Tab 4   • cholestyramine (QUESTRAN,PREVALITE) 4 GM Pack Take 4 g by mouth every morning.     • melatonin 3 MG Tab Take 6 mg by mouth every bedtime.     • traZODone (DESYREL) 100 MG Tab Take 100 mg by mouth every bedtime.     • nystatin (MYCOSTATIN) 030250 UNIT/GM Cream topical cream Apply 0.0001 g to affected area(s) 2 times a day. 1 Tube 3   • furosemide (LASIX) 40 MG Tab Take 40 mg by mouth every day.     • cyanocobalamin ([facility]  "VITAMIN B-12) 500 MCG tablet Take 500 mcg by mouth every day.     • busPIRone (BUSPAR) 10 MG Tab Take 0.5 Tabs by mouth 2 times a day. 60 Tab 2   • liraglutide (VICTOZA) 18 MG/3ML Solution Pen-injector injection Inject 1.8 mg as instructed every day.     • ivabradine (CORLANOR) 5 MG Tab tablet Take 1 Tab by mouth 2 times a day, with meals. 60 Tab 11   • sodium bicarbonate (SODIUM BICARBONATE) 650 MG Tab Take 650 mg by mouth 2 times a day.     • aspirin EC (ECOTRIN) 81 MG Tablet Delayed Response Take 1 Tab by mouth every day. 30 Tab 6     No current facility-administered medications for this visit.          Allergies as of 01/16/2019 - Reviewed 01/16/2019   Allergen Reaction Noted   • Cefdinir Shortness of Breath and Itching 03/01/2016   • Depakote [divalproex sodium] Unspecified 06/14/2010   • Doxycycline Anaphylaxis and Vomiting 08/15/2012   • Abilify Unspecified 01/17/2013   • Amitriptyline Unspecified 10/31/2013   • Amoxicillin Rash 09/18/2010   • Ciprofloxacin Rash 12/17/2009   • Clindamycin Nausea 02/02/2011   • Ees [erythromycin] Vomiting and Nausea 08/28/2010   • Flagyl [metronidazole hcl] Unspecified 03/31/2011   • Flomax [tamsulosin hydrochloride] Swelling 09/24/2009   • Metformin Unspecified 07/23/2013   • Sulfa drugs Hives and Rash 09/18/2010   • Tape Rash 08/15/2012   • Vancomycin Itching 07/10/2016   • Wound dressing adhesive Hives 01/12/2018   • Cephalexin [keflex] Rash 01/01/2017   • Levofloxacin Unspecified 10/27/2016   • Metronidazole Rash 03/30/2017   • Valproic acid Rash 03/30/2017        ROS: Denies Chest pain, SOB, LE edema.    /72 (BP Location: Right arm, Patient Position: Sitting)   Pulse 95   Temp 36.8 °C (98.3 °F)   Ht 1.651 m (5' 5\")   Wt 120.2 kg (265 lb)   SpO2 97%   BMI 44.10 kg/m²     Physical Exam:  Gen:         Alert and oriented, No apparent distress.  Heent        TM on right erythematous slight amount of fluid behind the drum.  Neck:        No Lymphadenopathy or " Bruits.  Lungs:     Clear to auscultation bilaterally  CV:          Regular rate and rhythm. No murmurs, rubs or gallops.               Ext:          No clubbing, cyanosis, edema.      Assessment and Plan.   28 y.o. female with the following issues.    1. Ear infection  Have placed on antibiotic she is listed as having nausea from erythromycin but denies any history of anaphylaxis.    - azithromycin (ZITHROMAX Z-ASHLEY) 250 MG Tab; Take 1 Tab by mouth every day for 5 days. Take 2 tabs on day 1  Dispense: 6 Tab; Refill: 0

## 2019-01-18 ENCOUNTER — PATIENT OUTREACH (OUTPATIENT)
Dept: HEALTH INFORMATION MANAGEMENT | Facility: OTHER | Age: 29
End: 2019-01-18

## 2019-01-23 ENCOUNTER — OFFICE VISIT (OUTPATIENT)
Dept: MEDICAL GROUP | Facility: MEDICAL CENTER | Age: 29
End: 2019-01-23
Payer: MEDICARE

## 2019-01-23 ENCOUNTER — TELEPHONE (OUTPATIENT)
Dept: MEDICAL GROUP | Facility: MEDICAL CENTER | Age: 29
End: 2019-01-23

## 2019-01-23 VITALS
SYSTOLIC BLOOD PRESSURE: 114 MMHG | OXYGEN SATURATION: 95 % | HEART RATE: 83 BPM | WEIGHT: 274 LBS | DIASTOLIC BLOOD PRESSURE: 80 MMHG | BODY MASS INDEX: 45.65 KG/M2 | HEIGHT: 65 IN | TEMPERATURE: 97.9 F

## 2019-01-23 DIAGNOSIS — M79.7 FIBROMYALGIA: ICD-10-CM

## 2019-01-23 DIAGNOSIS — H66.90 EAR INFECTION: ICD-10-CM

## 2019-01-23 DIAGNOSIS — S91.301D OPEN WOUND OF RIGHT HEEL, SUBSEQUENT ENCOUNTER: ICD-10-CM

## 2019-01-23 PROBLEM — N61.0 CELLULITIS OF LEFT BREAST: Status: RESOLVED | Noted: 2018-08-03 | Resolved: 2019-01-23

## 2019-01-23 PROBLEM — R07.9 CHEST PAIN: Status: RESOLVED | Noted: 2017-03-30 | Resolved: 2019-01-23

## 2019-01-23 PROCEDURE — 99214 OFFICE O/P EST MOD 30 MIN: CPT | Performed by: INTERNAL MEDICINE

## 2019-01-23 RX ORDER — PREGABALIN 300 MG/1
300 CAPSULE ORAL 2 TIMES DAILY
Qty: 60 CAP | Refills: 0 | Status: SHIPPED | OUTPATIENT
Start: 2019-01-23 | End: 2019-02-22

## 2019-01-23 NOTE — PROGRESS NOTES
CC: Follow-up ear pain, foot wound, chronic myalgias    HPI:   Kristin presents today with the following.    1. Open wound of right heel, subsequent encounter  Present reporting she caught her heel on a nail several days ago.  No longer bleeding denies any drainage or fevers no spreading redness.  Still uncomfortable to walk on.    2.  Ear pain  Having persistent bilateral ear pain after treated with antibiotics without improvement.  She is reporting her ears are still popping.    3. Fibromyalgia  She is having persistent fibromyalgia cannot get into her pain specialist for another month is needing refills of her Lyrica.  Not currently on any other controlled substances for narcotics.  Her dosage was recently increased she will be coming due next month before she is able to be seen by pain specialist.      Patient Active Problem List    Diagnosis Date Noted   • Left leg weakness 07/14/2018     Priority: High   • Controlled type 2 diabetes mellitus without complication, without long-term current use of insulin ([facility]) 04/26/2017     Priority: High   • Sinus tachycardia 10/31/2013     Priority: High   • Chronic inflammatory arthritis 05/23/2016     Priority: Medium   • Morbid obesity with BMI of 45.0-49.9, adult ([facility]) 10/24/2017     Priority: Low   • Depression 10/28/2016     Priority: Low   • Schizophrenia ([facility]) 10/27/2016     Priority: Low   • Psychosis, schizophrenia, simple ([facility]) 09/29/2016     Priority: Low     Class: Chronic   • Acquired hypothyroidism 11/23/2015     Priority: Low   • PCOS (polycystic ovarian syndrome) 11/23/2015     Priority: Low   • Progressive focal motor weakness 06/28/2015     Priority: Low   • Fatty liver disease, nonalcoholic 01/19/2015     Priority: Low   • Anxiety 12/16/2014     Priority: Low   • Knee pain, right 02/13/2014     Priority: Low   • Neurogenic bladder 04/02/2011     Priority: Low   • Chronic UTI 09/18/2010     Priority: Low   • Borderline personality disorder in adult ([facility])  09/18/2010     Priority: Low   • Asymptomatic bacteriuria 10/26/2018   • Palpitations 10/01/2018   • Chronic respiratory failure with hypoxia, on home oxygen therapy ([facility]) 08/08/2018   • Transaminitis 08/08/2018   • TONYA (obstructive sleep apnea) 01/09/2018   • Functional diarrhea 01/05/2018   • Breast wound 11/06/2017   • Intractable episodic cluster headache 09/14/2017   • Rash 06/09/2017   • Hashimoto's encephalopathy 05/17/2017   • Fall at home 05/13/2017   • On home oxygen therapy 04/15/2017   • Weakness of right upper extremity 02/23/2017   • Chronic suprapubic catheter ([facility]) 02/16/2017   • Hypovitaminosis D 11/29/2016   • Chronic pain syndrome 10/27/2016   • Bowel and bladder incontinence 10/27/2016   • Galactorrhea 07/22/2016   • Elevated sedimentation rate 06/27/2016   • Weakness of both lower extremities 06/22/2016   • Vitamin D deficiency 05/21/2016   • Morbidly obese ([facility]) 03/07/2016   • Scoliosis 03/07/2016   • GERD (gastroesophageal reflux disease) 03/07/2016   • Peripheral neuropathy ([facility]) 03/06/2016   • H/O prior ablation treatment 02/10/2016       Current Outpatient Prescriptions   Medication Sig Dispense Refill   • pregabalin (LYRICA) 300 MG capsule Take 1 Cap by mouth 2 times a day for 30 days. 60 Cap 0   • ziprasidone (GEODON) 80 MG Cap Take 1 Cap by mouth 2 Times a Day. 60 Cap 2   • FLUoxetine (PROZAC) 10 MG Cap TAKE ONE CAPSULE BY MOUTH ONCE A DAY 30 Cap 2   • omeprazole (PRILOSEC) 20 MG delayed-release capsule Take 1 Cap by mouth every day. 90 Cap 3   • busPIRone (BUSPAR) 5 MG tablet Take 1 Tab by mouth 2 times a day. 60 Tab 5   • lidocaine (LIDODERM) 5 % Patch Apply 1 Patch to skin as directed every 24 hours. 10 Patch 2   • baclofen (LIORESAL) 10 MG Tab TAKE ONE TABLET BY MOUTH THREE TIMES DAILY 90 Tab 4   • cholestyramine (QUESTRAN,PREVALITE) 4 GM Pack Take 4 g by mouth every morning.     • melatonin 3 MG Tab Take 6 mg by mouth every bedtime.     • traZODone (DESYREL) 100 MG Tab Take 100  "mg by mouth every bedtime.     • nystatin (MYCOSTATIN) 381604 UNIT/GM Cream topical cream Apply 0.0001 g to affected area(s) 2 times a day. 1 Tube 3   • furosemide (LASIX) 40 MG Tab Take 40 mg by mouth every day.     • cyanocobalamin ([employer] VITAMIN B-12) 500 MCG tablet Take 500 mcg by mouth every day.     • busPIRone (BUSPAR) 10 MG Tab Take 0.5 Tabs by mouth 2 times a day. 60 Tab 2   • liraglutide (VICTOZA) 18 MG/3ML Solution Pen-injector injection Inject 1.8 mg as instructed every day.     • ivabradine (CORLANOR) 5 MG Tab tablet Take 1 Tab by mouth 2 times a day, with meals. 60 Tab 11   • sodium bicarbonate (SODIUM BICARBONATE) 650 MG Tab Take 650 mg by mouth 2 times a day.     • aspirin EC (ECOTRIN) 81 MG Tablet Delayed Response Take 1 Tab by mouth every day. 30 Tab 6     No current facility-administered medications for this visit.          Allergies as of 01/23/2019 - Reviewed 01/23/2019   Allergen Reaction Noted   • Cefdinir Shortness of Breath and Itching 03/01/2016   • Depakote [divalproex sodium] Unspecified 06/14/2010   • Doxycycline Anaphylaxis and Vomiting 08/15/2012   • Abilify Unspecified 01/17/2013   • Amitriptyline Unspecified 10/31/2013   • Amoxicillin Rash 09/18/2010   • Ciprofloxacin Rash 12/17/2009   • Clindamycin Nausea 02/02/2011   • Ees [erythromycin] Vomiting and Nausea 08/28/2010   • Flagyl [metronidazole hcl] Unspecified 03/31/2011   • Flomax [tamsulosin hydrochloride] Swelling 09/24/2009   • Metformin Unspecified 07/23/2013   • Sulfa drugs Hives and Rash 09/18/2010   • Tape Rash 08/15/2012   • Vancomycin Itching 07/10/2016   • Wound dressing adhesive Hives 01/12/2018   • Cephalexin [keflex] Rash 01/01/2017   • Levofloxacin Unspecified 10/27/2016   • Metronidazole Rash 03/30/2017   • Valproic acid Rash 03/30/2017        ROS: Denies Chest pain, SOB, LE edema.    /80 (BP Location: Right arm, Patient Position: Sitting)   Pulse 83   Temp 36.6 °C (97.9 °F)   Ht 1.651 m (5' 5\")   Wt 124.3 " kg (274 lb)   SpO2 95%   BMI 45.60 kg/m²     Physical Exam:  Gen:         Alert and oriented, No apparent distress.  TM            slightly retracted without erythema  Neck:        No Lymphadenopathy or Bruits.  Lungs:     Clear to auscultation bilaterally  CV:          Regular rate and rhythm. No murmurs, rubs or gallops.               Ext:          No clubbing, cyanosis, edema.      Assessment and Plan.   28 y.o. female with the following issues.    1. Open wound of right heel, subsequent encounter  Wound on foot very superficial in nature no obvious erythema or drainage referring to wound care to ensure closure  - REFERRAL TO WOUND CLINIC    2.  ear pain  Looks improved from last visit no obvious findings signs of infection symptoms persist recommend ENT.    3. Fibromyalgia  Have given a one-time refill of medication to get her to pain specialist.  - pregabalin (LYRICA) 300 MG capsule; Take 1 Cap by mouth 2 times a day for 30 days.  Dispense: 60 Cap; Refill: 0

## 2019-01-23 NOTE — TELEPHONE ENCOUNTER
Spoke with patient and she already has an ENT doctor she will go to. She has a question on a medication and would like to keep the appointment. Will not cancel for now.

## 2019-01-23 NOTE — TELEPHONE ENCOUNTER
----- Message from Torres Brody M.D. sent at 1/23/2019  8:02 AM PST -----  Regarding: please call  If ear not getting better next step is ENT will place referral and she can cancel.

## 2019-01-25 ENCOUNTER — HOSPITAL ENCOUNTER (EMERGENCY)
Facility: MEDICAL CENTER | Age: 29
End: 2019-01-25
Attending: EMERGENCY MEDICINE
Payer: MEDICARE

## 2019-01-25 ENCOUNTER — APPOINTMENT (OUTPATIENT)
Dept: RADIOLOGY | Facility: MEDICAL CENTER | Age: 29
End: 2019-01-25
Payer: MEDICARE

## 2019-01-25 ENCOUNTER — APPOINTMENT (OUTPATIENT)
Dept: RADIOLOGY | Facility: MEDICAL CENTER | Age: 29
End: 2019-01-25
Attending: EMERGENCY MEDICINE
Payer: MEDICARE

## 2019-01-25 ENCOUNTER — TELEPHONE (OUTPATIENT)
Dept: CARDIOLOGY | Facility: MEDICAL CENTER | Age: 29
End: 2019-01-25

## 2019-01-25 ENCOUNTER — NON-PROVIDER VISIT (OUTPATIENT)
Dept: WOUND CARE | Facility: MEDICAL CENTER | Age: 29
End: 2019-01-25
Attending: INTERNAL MEDICINE
Payer: MEDICARE

## 2019-01-25 VITALS
HEART RATE: 65 BPM | WEIGHT: 274 LBS | OXYGEN SATURATION: 98 % | TEMPERATURE: 98.9 F | BODY MASS INDEX: 45.65 KG/M2 | SYSTOLIC BLOOD PRESSURE: 139 MMHG | RESPIRATION RATE: 20 BRPM | DIASTOLIC BLOOD PRESSURE: 67 MMHG | HEIGHT: 65 IN

## 2019-01-25 DIAGNOSIS — N39.0 URINARY TRACT INFECTION WITHOUT HEMATURIA, SITE UNSPECIFIED: ICD-10-CM

## 2019-01-25 DIAGNOSIS — R07.9 CHEST PAIN, UNSPECIFIED TYPE: ICD-10-CM

## 2019-01-25 LAB
ALBUMIN SERPL BCP-MCNC: 4.4 G/DL (ref 3.2–4.9)
ALBUMIN/GLOB SERPL: 1.6 G/DL
ALP SERPL-CCNC: 58 U/L (ref 30–99)
ALT SERPL-CCNC: 31 U/L (ref 2–50)
ANION GAP SERPL CALC-SCNC: 9 MMOL/L (ref 0–11.9)
APPEARANCE UR: CLEAR
APTT PPP: 31.4 SEC (ref 24.7–36)
AST SERPL-CCNC: 19 U/L (ref 12–45)
BACTERIA #/AREA URNS HPF: ABNORMAL /HPF
BASOPHILS # BLD AUTO: 0.4 % (ref 0–1.8)
BASOPHILS # BLD: 0.02 K/UL (ref 0–0.12)
BILIRUB SERPL-MCNC: 0.4 MG/DL (ref 0.1–1.5)
BILIRUB UR QL STRIP.AUTO: NEGATIVE
BNP SERPL-MCNC: 22 PG/ML (ref 0–100)
BUN SERPL-MCNC: 8 MG/DL (ref 8–22)
CALCIUM SERPL-MCNC: 9.4 MG/DL (ref 8.5–10.5)
CHLORIDE SERPL-SCNC: 107 MMOL/L (ref 96–112)
CO2 SERPL-SCNC: 23 MMOL/L (ref 20–33)
COLOR UR: YELLOW
CREAT SERPL-MCNC: 0.65 MG/DL (ref 0.5–1.4)
D DIMER PPP IA.FEU-MCNC: <0.4 UG/ML (FEU) (ref 0–0.5)
EKG IMPRESSION: NORMAL
EOSINOPHIL # BLD AUTO: 0.08 K/UL (ref 0–0.51)
EOSINOPHIL NFR BLD: 1.4 % (ref 0–6.9)
EPI CELLS #/AREA URNS HPF: ABNORMAL /HPF
ERYTHROCYTE [DISTWIDTH] IN BLOOD BY AUTOMATED COUNT: 40 FL (ref 35.9–50)
GLOBULIN SER CALC-MCNC: 2.7 G/DL (ref 1.9–3.5)
GLUCOSE SERPL-MCNC: 93 MG/DL (ref 65–99)
GLUCOSE UR STRIP.AUTO-MCNC: NEGATIVE MG/DL
HCT VFR BLD AUTO: 44.3 % (ref 37–47)
HGB BLD-MCNC: 15.1 G/DL (ref 12–16)
HYALINE CASTS #/AREA URNS LPF: ABNORMAL /LPF
IMM GRANULOCYTES # BLD AUTO: 0.01 K/UL (ref 0–0.11)
IMM GRANULOCYTES NFR BLD AUTO: 0.2 % (ref 0–0.9)
INR PPP: 1.09 (ref 0.87–1.13)
KETONES UR STRIP.AUTO-MCNC: NEGATIVE MG/DL
LEUKOCYTE ESTERASE UR QL STRIP.AUTO: ABNORMAL
LIPASE SERPL-CCNC: 24 U/L (ref 11–82)
LYMPHOCYTES # BLD AUTO: 1.76 K/UL (ref 1–4.8)
LYMPHOCYTES NFR BLD: 31 % (ref 22–41)
MCH RBC QN AUTO: 30.8 PG (ref 27–33)
MCHC RBC AUTO-ENTMCNC: 34.1 G/DL (ref 33.6–35)
MCV RBC AUTO: 90.4 FL (ref 81.4–97.8)
MICRO URNS: ABNORMAL
MONOCYTES # BLD AUTO: 0.42 K/UL (ref 0–0.85)
MONOCYTES NFR BLD AUTO: 7.4 % (ref 0–13.4)
NEUTROPHILS # BLD AUTO: 3.38 K/UL (ref 2–7.15)
NEUTROPHILS NFR BLD: 59.6 % (ref 44–72)
NITRITE UR QL STRIP.AUTO: NEGATIVE
NRBC # BLD AUTO: 0 K/UL
NRBC BLD-RTO: 0 /100 WBC
PH UR STRIP.AUTO: 5.5 [PH]
PLATELET # BLD AUTO: 176 K/UL (ref 164–446)
PMV BLD AUTO: 12.2 FL (ref 9–12.9)
POTASSIUM SERPL-SCNC: 4 MMOL/L (ref 3.6–5.5)
PROT SERPL-MCNC: 7.1 G/DL (ref 6–8.2)
PROT UR QL STRIP: NEGATIVE MG/DL
PROTHROMBIN TIME: 14.3 SEC (ref 12–14.6)
RBC # BLD AUTO: 4.9 M/UL (ref 4.2–5.4)
RBC # URNS HPF: ABNORMAL /HPF
RBC UR QL AUTO: NEGATIVE
SODIUM SERPL-SCNC: 139 MMOL/L (ref 135–145)
SP GR UR STRIP.AUTO: 1.02
TROPONIN I SERPL-MCNC: <0.01 NG/ML (ref 0–0.04)
TROPONIN I SERPL-MCNC: <0.01 NG/ML (ref 0–0.04)
UROBILINOGEN UR STRIP.AUTO-MCNC: 0.2 MG/DL
WBC # BLD AUTO: 5.7 K/UL (ref 4.8–10.8)
WBC #/AREA URNS HPF: ABNORMAL /HPF

## 2019-01-25 PROCEDURE — 85730 THROMBOPLASTIN TIME PARTIAL: CPT

## 2019-01-25 PROCEDURE — 36415 COLL VENOUS BLD VENIPUNCTURE: CPT

## 2019-01-25 PROCEDURE — 83690 ASSAY OF LIPASE: CPT

## 2019-01-25 PROCEDURE — 700102 HCHG RX REV CODE 250 W/ 637 OVERRIDE(OP): Performed by: EMERGENCY MEDICINE

## 2019-01-25 PROCEDURE — 85025 COMPLETE CBC W/AUTO DIFF WBC: CPT

## 2019-01-25 PROCEDURE — 71045 X-RAY EXAM CHEST 1 VIEW: CPT

## 2019-01-25 PROCEDURE — A9270 NON-COVERED ITEM OR SERVICE: HCPCS | Performed by: EMERGENCY MEDICINE

## 2019-01-25 PROCEDURE — 93005 ELECTROCARDIOGRAM TRACING: CPT

## 2019-01-25 PROCEDURE — 85379 FIBRIN DEGRADATION QUANT: CPT

## 2019-01-25 PROCEDURE — 85610 PROTHROMBIN TIME: CPT

## 2019-01-25 PROCEDURE — 93005 ELECTROCARDIOGRAM TRACING: CPT | Performed by: EMERGENCY MEDICINE

## 2019-01-25 PROCEDURE — 99211 OFF/OP EST MAY X REQ PHY/QHP: CPT

## 2019-01-25 PROCEDURE — 87086 URINE CULTURE/COLONY COUNT: CPT

## 2019-01-25 PROCEDURE — 80053 COMPREHEN METABOLIC PANEL: CPT

## 2019-01-25 PROCEDURE — 84484 ASSAY OF TROPONIN QUANT: CPT

## 2019-01-25 PROCEDURE — 99283 EMERGENCY DEPT VISIT LOW MDM: CPT | Performed by: INTERNAL MEDICINE

## 2019-01-25 PROCEDURE — 83880 ASSAY OF NATRIURETIC PEPTIDE: CPT

## 2019-01-25 PROCEDURE — 81001 URINALYSIS AUTO W/SCOPE: CPT

## 2019-01-25 PROCEDURE — 99284 EMERGENCY DEPT VISIT MOD MDM: CPT

## 2019-01-25 RX ORDER — LACTULOSE 10 G/15ML
10 SOLUTION ORAL 2 TIMES DAILY PRN
COMMUNITY
End: 2019-05-27

## 2019-01-25 RX ORDER — ERGOCALCIFEROL 1.25 MG/1
50000 CAPSULE ORAL
COMMUNITY
End: 2019-05-20

## 2019-01-25 RX ORDER — NITROFURANTOIN 25; 75 MG/1; MG/1
100 CAPSULE ORAL 2 TIMES DAILY
Qty: 20 CAP | Refills: 0 | Status: SHIPPED | OUTPATIENT
Start: 2019-01-25 | End: 2019-02-04

## 2019-01-25 RX ORDER — ACETAMINOPHEN 325 MG/1
650 TABLET ORAL ONCE
Status: COMPLETED | OUTPATIENT
Start: 2019-01-25 | End: 2019-01-25

## 2019-01-25 RX ADMIN — ACETAMINOPHEN 650 MG: 325 TABLET, FILM COATED ORAL at 11:29

## 2019-01-25 ASSESSMENT — LIFESTYLE VARIABLES: DO YOU DRINK ALCOHOL: NO

## 2019-01-25 ASSESSMENT — PAIN DESCRIPTION - DESCRIPTORS: DESCRIPTORS: TINGLING;NUMB

## 2019-01-25 NOTE — ED PROVIDER NOTES
"ED Provider Note    CHIEF COMPLAINT  Chief Complaint   Patient presents with   • Chest Pain     x 4 days   • Arm Pain     left arm n/t   • Headache   • Weakness   • Palpitations     's x 4 days       HPI  Kristin Balderrama is a 28 y.o. female who presents complaining of chest pain, left chest described as a bubble like pressure.  She states onset was this morning, has also felt it over the past 4 days with palpitations.  Patient has history of sinus tachycardia, previous ablation.  She is on medical therapy to help suppress this.  Patient states she contacted the cardiology clinic today and was told to come to the emergency department for evaluation.  She denies fever.  No cough.  Patient has history of urinary tract infections, denies current dysuria.  She also complains of mild headache, generalized weakness.  Pain in the chest intermittently radiates to the left arm.  Last stress test was negative April 2017.  Cardiac risk factors include diabetes, obesity, vague family history.  Patient states she missed a dose of medicine for her heart by approximately 4 hours 4 days ago when the tachycardia started.  She states she is otherwise been compliant.    REVIEW OF SYSTEMS    Constitutional: Dizziness, denies fever  Respiratory: No pleuritic chest pain  Cardiac: Chest pressure, history of tachycardia  Gastrointestinal: No abdominal pain, no vomiting  Musculoskeletal: Chronic pain, pain radiated to the left arm  Neurologic: Headache.  No acute numbness or weakness  Endocrine: Diabetes       All other systems are negative.       PAST MEDICAL HISTORY  Past Medical History:   Diagnosis Date   • Abdominal pain    • Anginal syndrome     random chest pain especially with tachycardia   • Apnea, sleep    • Arrhythmia     \"sinus tachycardia\", cariologist, Dr. Kumar; ablation 2/2016   • Arthritis     osteo   • ASTHMA     when around smoke   • Atrial fibrillation (HCC)    • Back pain    • Borderline personality disorder " "([facility])    • Breath shortness     with tachycardia   • Cardiac arrhythmia    • Chickenpox    • Chronic UTI 9/18/2010   • Cough    • Daytime sleepiness    • Depression    • Diabetes ([facility])    • Diarrhea    • Disorder of thyroid    • Fall    • Fatigue    • Frequent headaches    • Gasping for breath    • Gynecological disorder     PCOS   • Headache(784.0)    • Heart burn    • History of falling    • Hypertension    • Migraine    • 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[facility])    • 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[facility])    • Nausea    • Obesity    • Pain 08-15-12    back, 7/10   • Painful joint    • PCOS (polycystic ovarian syndrome)    • Pneumonia 2012   • Psychosis ([facility])    • Renal disorder     \"kidney disease, stage 1\" nephrologist, Dr. 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   • Urinary bladder disorder     Suprapubic cath   • Urinary incontinence    • Weakness    • Wears glasses        FAMILY HISTORY  Family History   Problem Relation Age of Onset   • Hypertension Mother    • Sleep Apnea Mother    • Heart Disease Mother    • Other Mother         hypothryod   • Hypertension Maternal Uncle    • Heart Disease Maternal Grandmother    • Hypertension Maternal Grandmother    • No Known Problems Sister    • Other Sister         Narcolepsy;fibromyalsia;bone;nerve   • Genitourinary () Sister         endometriosis       SOCIAL HISTORY  Social History     Social History   • Marital status: Single     Spouse name: N/A   • Number of children: N/A   • Years of education: N/A     Social History Main Topics   • Smoking status: Never Smoker   • Smokeless tobacco: Never Used   • Alcohol use No   • Drug use: Yes     Frequency: 7.0 times per week     Types: Marijuana, Inhaled      Comment: Medicinal edible's   • Sexual activity: Not " Currently     Birth control/ protection: Pill     Other Topics Concern   • Not on file     Social History Narrative    ** Merged History Encounter **            SURGICAL HISTORY  Past Surgical History:   Procedure Laterality Date   • MUSCLE BIOPSY Right 1/26/2017    Procedure: MUSCLE BIOPSY - THIGH;  Surgeon: Isidro Vigil M.D.;  Location: SURGERY [facility];  Service:    • GASTROSCOPY WITH BALLOON DILATATION N/A 1/4/2017    Procedure: GASTROSCOPY WITH DILATATION;  Surgeon: Torres Varags M.D.;  Location: SURGERY [facility];  Service:    • BOWEL STIMULATOR INSERTION  7/13/2016    Procedure: BOWEL STIMULATOR INSERTION FOR PERMANENT INTERSTIM SACRAL IMPLANT;  Surgeon: Joe Noyola M.D.;  Location: SURGERY [facility];  Service:    • RECOVERY  1/27/2016    Procedure: CATH LAB EP STUDY WITH SINUS NODE MODIFICATION LA;  Surgeon: Recoveryonly Surgery;  Location: SURGERY PRE-POST PROC UNIT [facility];  Service:    • OTHER CARDIAC SURGERY  1/2016    cardiac ablation   • NEURO DEST FACET L/S W/IG SNGL  4/21/2015    Performed by Reza Tabor at [facility]   • LUMBAR FUSION ANTERIOR  8/21/2012    Performed by SUSIE SAWANT at SURGERY [facility]S   • ALYSSA BY LAPAROSCOPY  8/29/2010    Performed by SHAYY JOHNS at SURGERY [facility]S   • LAMINOTOMY     • OTHER ABDOMINAL SURGERY     • TONSILLECTOMY      tonsillectomy       CURRENT MEDICATIONS  Home Medications     Reviewed by Negin Elizondo R.N. (Registered Nurse) on 01/25/19 at 0958  Med List Status: Complete   Medication Last Dose Status   aspirin EC (ECOTRIN) 81 MG Tablet Delayed Response  Active   baclofen (LIORESAL) 10 MG Tab 1/25/2019 Active   busPIRone (BUSPAR) 5 MG tablet 1/25/2019 Active   cholestyramine (QUESTRAN,PREVALITE) 4 GM Pack prn Active   cyanocobalamin ([employer] VITAMIN B-12) 500 MCG tablet prn Active   FLUoxetine (PROZAC) 10 MG Cap 1/25/2019 Active   furosemide (LASIX) 40 MG Tab prn Active   ivabradine (CORLANOR) 5 MG  "Tab tablet  Active   lactulose 10 GM/15ML Solution  Active   lidocaine (LIDODERM) 5 % Patch 1/24/2019 Active   melatonin 3 MG Tab 1/24/2019 Active   nystatin (MYCOSTATIN) 965895 UNIT/GM Cream topical cream  Active   omeprazole (PRILOSEC) 20 MG delayed-release capsule 1/24/2019 Active   pregabalin (LYRICA) 300 MG capsule 1/25/2019 Active   sodium bicarbonate (SODIUM BICARBONATE) 650 MG Tab 1/25/2019 Active   traZODone (DESYREL) 100 MG Tab 1/24/2019 Active   vitamin D, Ergocalciferol, (DRISDOL) 38839 units Cap capsule 1/25/2019 Active   ziprasidone (GEODON) 80 MG Cap 1/25/2019 Active                ALLERGIES  Allergies   Allergen Reactions   • Cefdinir Shortness of Breath and Itching     Tolerated 1/18/17  Tolerates ceftriaxone    • Depakote [Divalproex Sodium] Unspecified     Muscle spasms/muscle pain and weakness     • Doxycycline Anaphylaxis and Vomiting     RXN=unknown   • Abilify Unspecified     Headaches/muscle twitching     • Amitriptyline Unspecified     Headaches     • Amoxicillin Rash     Pt states \"I get a rash\".     • Ciprofloxacin Rash     Pt states \"I get a rash\".     • Clindamycin Nausea     Even with food     • Ees [Erythromycin] Vomiting and Nausea   • Flagyl [Metronidazole Hcl] Unspecified     \"eye problems\"     • Flomax [Tamsulosin Hydrochloride] Swelling   • Metformin Unspecified     Increased lactic acid      • Sulfa Drugs Hives and Rash     RXN=since childhood  PATIENT DID FINE WITH BACTRIM X 2 COURSES 10/2018   • Tape Rash     Tears skin off   coban with Tegaderm tape ok  RXN=ongoing   • Vancomycin Itching     Pt becomes flushed in face and chest.   RXN=7/10/16   • Wound Dressing Adhesive Hives     By pt report   • Cephalexin [Keflex] Rash     Pt states she gets a rash with this medication  Tolerates ceftriaxone   • Levofloxacin Unspecified     Leg muscle cramps   • Metronidazole Rash     .   • Valproic Acid Rash     .       PHYSICAL EXAM  VITAL SIGNS: /67   Pulse 67   Temp 37.2 °C (98.9 " "°F) (Temporal)   Resp (!) 22   Ht 1.651 m (5' 5\")   Wt 124.3 kg (274 lb)   LMP 01/11/2019 (Approximate)   SpO2 95%   BMI 45.60 kg/m²   Constitutional:  Non-toxic appearance.  No distress  HENT: No facial swelling.  No epistaxis  Eyes: Anicteric, no conjunctivitis.   Pupils are equal  Cardiovascular: Normal heart rate, Normal rhythm  Pulmonary:  No wheezing, No rales.  Moderate air movement bilateral  Gastrointestinal: Soft, No tenderness, mild distention.  Skin: Warm, Dry, No cyanosis.   Neurologic: Speech is clear, follows commands, facial expression is symmetrical.  Psychiatric: Anxious, otherwise affect normal,  Mood normal.  Patient is  cooperative  Musculoskeletal: No chest wall tenderness    EKG/Labs  Results for orders placed or performed during the hospital encounter of 01/25/19   Troponin   Result Value Ref Range    Troponin I <0.01 0.00 - 0.04 ng/mL   Btype Natriuretic Peptide   Result Value Ref Range    B Natriuretic Peptide 22 0 - 100 pg/mL   CBC with Differential   Result Value Ref Range    WBC 5.7 4.8 - 10.8 K/uL    RBC 4.90 4.20 - 5.40 M/uL    Hemoglobin 15.1 12.0 - 16.0 g/dL    Hematocrit 44.3 37.0 - 47.0 %    MCV 90.4 81.4 - 97.8 fL    MCH 30.8 27.0 - 33.0 pg    MCHC 34.1 33.6 - 35.0 g/dL    RDW 40.0 35.9 - 50.0 fL    Platelet Count 176 164 - 446 K/uL    MPV 12.2 9.0 - 12.9 fL    Neutrophils-Polys 59.60 44.00 - 72.00 %    Lymphocytes 31.00 22.00 - 41.00 %    Monocytes 7.40 0.00 - 13.40 %    Eosinophils 1.40 0.00 - 6.90 %    Basophils 0.40 0.00 - 1.80 %    Immature Granulocytes 0.20 0.00 - 0.90 %    Nucleated RBC 0.00 /100 WBC    Neutrophils (Absolute) 3.38 2.00 - 7.15 K/uL    Lymphs (Absolute) 1.76 1.00 - 4.80 K/uL    Monos (Absolute) 0.42 0.00 - 0.85 K/uL    Eos (Absolute) 0.08 0.00 - 0.51 K/uL    Baso (Absolute) 0.02 0.00 - 0.12 K/uL    Immature Granulocytes (abs) 0.01 0.00 - 0.11 K/uL    NRBC (Absolute) 0.00 K/uL   Complete Metabolic Panel (CMP)   Result Value Ref Range    Sodium 139 135 - " 145 mmol/L    Potassium 4.0 3.6 - 5.5 mmol/L    Chloride 107 96 - 112 mmol/L    Co2 23 20 - 33 mmol/L    Anion Gap 9.0 0.0 - 11.9    Glucose 93 65 - 99 mg/dL    Bun 8 8 - 22 mg/dL    Creatinine 0.65 0.50 - 1.40 mg/dL    Calcium 9.4 8.5 - 10.5 mg/dL    AST(SGOT) 19 12 - 45 U/L    ALT(SGPT) 31 2 - 50 U/L    Alkaline Phosphatase 58 30 - 99 U/L    Total Bilirubin 0.4 0.1 - 1.5 mg/dL    Albumin 4.4 3.2 - 4.9 g/dL    Total Protein 7.1 6.0 - 8.2 g/dL    Globulin 2.7 1.9 - 3.5 g/dL    A-G Ratio 1.6 g/dL   Prothrombin Time   Result Value Ref Range    PT 14.3 12.0 - 14.6 sec    INR 1.09 0.87 - 1.13   APTT   Result Value Ref Range    APTT 31.4 24.7 - 36.0 sec   Lipase   Result Value Ref Range    Lipase 24 11 - 82 U/L   D-DIMER   Result Value Ref Range    D-Dimer Screen <0.40 0.00 - 0.50 ug/mL (FEU)   Troponin in two (2) hours   Result Value Ref Range    Troponin I <0.01 0.00 - 0.04 ng/mL   URINALYSIS CULTURE, IF INDICATED   Result Value Ref Range    Color Yellow     Character Clear     Specific Gravity 1.021 <1.035    Ph 5.5 5.0 - 8.0    Glucose Negative Negative mg/dL    Ketones Negative Negative mg/dL    Protein Negative Negative mg/dL    Bilirubin Negative Negative    Urobilinogen, Urine 0.2 Negative    Nitrite Negative Negative    Leukocyte Esterase Small (A) Negative    Occult Blood Negative Negative    Micro Urine Req Microscopic    ESTIMATED GFR   Result Value Ref Range    GFR If African American >60 >60 mL/min/1.73 m 2    GFR If Non African American >60 >60 mL/min/1.73 m 2   URINE MICROSCOPIC (W/UA)   Result Value Ref Range    WBC 10-20 (A) /hpf    RBC 5-10 (A) /hpf    Bacteria Few (A) None /hpf    Epithelial Cells Few /hpf    Hyaline Cast 0-2 /lpf   EKG   Result Value Ref Range    Report       [facility]    Test Date:  2019-01-25  Pt Name:    HARLEYDEANDRA DE LA CRUZ              Department:   MRN:        [id number]                      Room:  Gender:     Female                        Technician: [id number]  :        [id number]                   Requested By:ER TRIAGE PROTOCOL  Order #:    [id number]                    Reading MD:    Measurements  Intervals                                Axis  Rate:       70                           P:          19  TN:         152                          QRS:        4  QRSD:       92                           T:          -7  QT:         428  QTc:        462    Interpretive Statements  SINUS RHYTHM  BORDERLINE T ABNORMALITIES, DIFFUSE LEADS  BASELINE WANDER IN LEAD(S) I,III,aVL  Compared to ECG 2018 20:40:20  T-wave abnormality now present         RADIOLOGY/PROCEDURES  DX-CHEST-LIMITED (1 VIEW)   Final Result      No acute cardiopulmonary process is identified.            COURSE & MEDICAL DECISION MAKING  Pertinent Labs & Imaging studies reviewed. (See chart for details)  Dr. Andino from cardiology has consulted on the patient.  Patient has negative serial troponins.  Nonspecific EKG.  Etiology of her chest discomfort is unknown, she does have chronic pain with fibromyalgia.  Gastritis, esophagitis, musculoskeletal pain all possible.  Patient negative for pulmonary embolism with negative d-dimer.  Chest x-ray is clear.  Patient has evidence of urinary tract infection, has previously grown enterococcus sensitive to Macrobid on previous urine culture.  Patient will be continued on this medication, she has multiple other allergies.  Plan for follow-up with cardiology as an outpatient, return to the emergency department if worse or for any concerns.  Plan to follow-up with her primary doctor for recheck after 1 week.  Patient is stating she is required lengthy prescriptions for antibiotics in the past, she will be prescribed a 10-day course of the Macrobid, to be altered by her primary doctor as deemed appropriate.    FINAL IMPRESSION     1. Chest pain, unspecified type    2. Urinary tract infection without hematuria, site unspecified                     Electronically signed by: Laron Dean, 1/25/2019 2:27 PM

## 2019-01-25 NOTE — ED TRIAGE NOTES
".  Chief Complaint   Patient presents with   • Chest Pain     x 4 days   • Arm Pain     left arm n/t   • Headache   • Weakness   • Palpitations     's x 4 days   Sudden onset 4 days ago.  Pt states \" I did not take my pills on time. \"  + lightheadedness.  8/10 left chest pain.  No difficulty breathing.  No N/V.  Ablation in 2016.     "

## 2019-01-25 NOTE — ED TRIAGE NOTES
.  Chief Complaint   Patient presents with   • Chest Pain     x 4 days   • Arm Pain     left arm n/t   • Headache   • Weakness   • Palpitations     's x 4 days     Pt sent by cardiology. Reports non radiating pain to left chest with left arm numbness x 4 days. ekg complete on arrival

## 2019-01-25 NOTE — PATIENT INSTRUCTIONS
-Should you experience any significant changes in your wound(s), such as infection (redness, swelling, localized heat, increased pain, fever > 101 F, chills) or have any questions regarding your home care instructions, please contact the wound center at [phone]. If after hours, contact your primary care physician or go to the hospital emergency room.

## 2019-01-25 NOTE — ED NOTES
All lines and monitors disconnected.  Discharge instructions reviewed, questions answered.  Pt to lobby via wheelchair, escorted by RN.   Pt provided with prescription X 1.  Pt states all belongings in possession.

## 2019-01-25 NOTE — CERTIFICATION
"Advanced Wound Care  [facility]  [address]  [address]  [place] [address]  [phone] Fax: [phone]      Initial Evaluation  For Certification Period: 1/25/2019-4/15/2019  Start of Care: 1/25/2019          Referring Physician: Torres Brody M.D.  Primary Physician: Torres Brody M.D.            Wound(s): Right Plantar Heel       Subjective:        HPI: Patient is a 28 year old female with a partial thickness right plantar heel wound. She states that on  1/19/2019, she brushed her foot against a nail which caused the wound. She states that initially her skin was wet and peeling from drainage, so she trimmed off the dead skin herself. She has been covering the wound with gauze.            Pain: Patient reports moderate pain at wound site.          Past Medical History:  Past Medical History:   Diagnosis Date   • Abdominal pain    • Anginal syndrome     random chest pain especially with tachycardia   • Apnea, sleep    • Arrhythmia     \"sinus tachycardia\", cariologist, Dr. Kumar; ablation 2/2016   • Arthritis     osteo   • ASTHMA     when around smoke   • Atrial fibrillation (HCC)    • Back pain    • Borderline personality disorder (HCC)    • Breath shortness     with tachycardia   • Cardiac arrhythmia    • Chickenpox    • Chronic UTI 9/18/2010   • Cough    • Daytime sleepiness    • Depression    • Diabetes (HCC)    • Diarrhea    • Disorder of thyroid    • Fall    • Fatigue    • Frequent headaches    • Gasping for breath    • Gynecological disorder     PCOS   • Headache(784.0)    • Heart burn    • History of falling    • Hypertension    • Migraine    • 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HCC)    • 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HCC)    • Nausea    • Obesity    • Pain 08-15-12    back, 7/10   • Painful joint    • PCOS (polycystic ovarian syndrome)    • Pneumonia 2012   • Psychosis (HCC)    • Renal disorder     \"kidney disease, stage 1\" nephrologist, Dr. 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   • Urinary bladder disorder     Suprapubic cath   • Urinary incontinence    • Weakness    • Wears glasses        Current Medications:  Current Outpatient Prescriptions:   •  lactulose 10 GM/15ML Solution, Take 10 g by mouth 2 times a day as needed., Disp: , Rfl:   •  vitamin D, Ergocalciferol, (DRISDOL) 71483 units Cap capsule, Take 50,000 Units by mouth every 7 days., Disp: , Rfl:   •  pregabalin (LYRICA) 300 MG capsule, Take 1 Cap by mouth 2 times a day for 30 days., Disp: 60 Cap, Rfl: 0  •  ziprasidone (GEODON) 80 MG Cap, Take 1 Cap by mouth 2 Times a Day., Disp: 60 Cap, Rfl: 2  •  FLUoxetine (PROZAC) 10 MG Cap, TAKE ONE CAPSULE BY MOUTH ONCE A DAY, Disp: 30 Cap, Rfl: 2  •  omeprazole (PRILOSEC) 20 MG delayed-release capsule, Take 1 Cap by mouth every day., Disp: 90 Cap, Rfl: 3  •  busPIRone (BUSPAR) 5 MG tablet, Take 1 Tab by mouth 2 times a day., Disp: 60 Tab, Rfl: 5  •  lidocaine (LIDODERM) 5 % Patch, Apply 1 Patch to skin as directed every 24 hours., Disp: 10 Patch, Rfl: 2  •  baclofen (LIORESAL) 10 MG Tab, TAKE ONE TABLET BY MOUTH THREE TIMES DAILY, Disp: 90 Tab, Rfl: 4  •  cholestyramine (QUESTRAN,PREVALITE) 4 GM Pack, Take 4 g by mouth every morning as needed., Disp: , Rfl:   •  melatonin 3 MG Tab, Take 6 mg by mouth every bedtime., Disp: , Rfl:   •  traZODone (DESYREL) 100 MG Tab, Take 100 mg by mouth every bedtime., Disp: , Rfl:   •  nystatin (MYCOSTATIN) 987454 UNIT/GM Cream topical cream, Apply 0.0001 g to affected area(s) 2 times a day., Disp: 1 Tube, Rfl: 3  •  furosemide (LASIX) 40 MG Tab, Take 80 mg by mouth every day., Disp: , Rfl:   •  cyanocobalamin ([employer] VITAMIN B-12) 500 MCG tablet, Take 500 mcg by mouth every day., Disp: , Rfl:   •  ivabradine (CORLANOR) 5 MG Tab tablet, Take 1 Tab by " "mouth 2 times a day, with meals., Disp: 60 Tab, Rfl: 11  •  sodium bicarbonate (SODIUM BICARBONATE) 650 MG Tab, Take 650 mg by mouth 2 times a day., Disp: , Rfl:   •  aspirin EC (ECOTRIN) 81 MG Tablet Delayed Response, Take 1 Tab by mouth every day., Disp: 30 Tab, Rfl: 6    Allergies:   Allergies   Allergen Reactions   • Cefdinir Shortness of Breath and Itching     Tolerated 1/18/17  Tolerates ceftriaxone    • Depakote [Divalproex Sodium] Unspecified     Muscle spasms/muscle pain and weakness     • Doxycycline Anaphylaxis and Vomiting     RXN=unknown   • Abilify Unspecified     Headaches/muscle twitching     • Amitriptyline Unspecified     Headaches     • Amoxicillin Rash     Pt states \"I get a rash\".     • Ciprofloxacin Rash     Pt states \"I get a rash\".     • Clindamycin Nausea     Even with food     • Ees [Erythromycin] Vomiting and Nausea   • Flagyl [Metronidazole Hcl] Unspecified     \"eye problems\"     • Flomax [Tamsulosin Hydrochloride] Swelling   • Metformin Unspecified     Increased lactic acid      • Sulfa Drugs Hives and Rash     RXN=since childhood  PATIENT DID FINE WITH BACTRIM X 2 COURSES 10/2018   • Tape Rash     Tears skin off   coban with Tegaderm tape ok  RXN=ongoing   • Vancomycin Itching     Pt becomes flushed in face and chest.   RXN=7/10/16   • Wound Dressing Adhesive Hives     By pt report   • Cephalexin [Keflex] Rash     Pt states she gets a rash with this medication  Tolerates ceftriaxone   • Levofloxacin Unspecified     Leg muscle cramps   • Metronidazole Rash     .   • Valproic Acid Rash     .       Past Surgical History:   Past Surgical History:   Procedure Laterality Date   • MUSCLE BIOPSY Right 1/26/2017    Procedure: MUSCLE BIOPSY - THIGH;  Surgeon: Isidro Vigil M.D.;  Location: SURGERY [facility];  Service:    • GASTROSCOPY WITH BALLOON DILATATION N/A 1/4/2017    Procedure: GASTROSCOPY WITH DILATATION;  Surgeon: Torres Vargas M.D.;  Location: SURGERY [facility];  Service:  "   • BOWEL STIMULATOR INSERTION  7/13/2016    Procedure: BOWEL STIMULATOR INSERTION FOR PERMANENT INTERSTIM SACRAL IMPLANT;  Surgeon: Joe Noyola M.D.;  Location: SURGERY [facility];  Service:    • RECOVERY  1/27/2016    Procedure: CATH LAB EP STUDY WITH SINUS NODE MODIFICATION ABHINAV;  Surgeon: Lina Surgery;  Location: SURGERY PRE-POST PROC UNIT [facility];  Service:    • OTHER CARDIAC SURGERY  1/2016    cardiac ablation   • NEURO DEST FACET L/S W/IG SNGL  4/21/2015    Performed by Reza Tabor at SURGERY [facility]   • LUMBAR FUSION ANTERIOR  8/21/2012    Performed by SUSIE SAWANT at SURGERY [facility]   • ALYSSA BY LAPAROSCOPY  8/29/2010    Performed by SHAYY JOHNS at SURGERY [facility]   • LAMINOTOMY     • OTHER ABDOMINAL SURGERY     • TONSILLECTOMY      tonsillectomy       Social History:   Social History     Social History   • Marital status: Single     Spouse name: N/A   • Number of children: N/A   • Years of education: N/A     Occupational History   • Not on file.     Social History Main Topics   • Smoking status: Never Smoker   • Smokeless tobacco: Never Used   • Alcohol use No   • Drug use: Yes     Frequency: 7.0 times per week     Types: Marijuana, Inhaled      Comment: Medicinal edible's   • Sexual activity: Not Currently     Birth control/ protection: Pill     Other Topics Concern   • Not on file     Social History Narrative    ** Merged History Encounter **                  Objective:      Tests and Measures:  1/25/2019: Right DP pulse easily palpable 2+.    Orthotic, protective, supportive devices: Wheelchair, however patient is ambulatory in and out of the wheelchair.    Fall Risk Assessment (vickie all that apply with an X):  Completed 1/25/2019, low fall risk.       65 years or older        Fall within the last 2 years   X Uses ambulatory devices     Loss of protective sensation in feet      Use of prostethic/orthotic       Presence of lower extremity/foot/toe  amputation      Taking medication that increases risk (per facility policy)    Interventions Recommended (if any of the above are selected):   Use of Assistive Device: Wheelchair   Supervision with ambulation: Caregiver   Assistance with ambulation: Caregiver   Home safety education: Educational material provided              Wound 01/25/19 Traumatic Heel --Right Plantar Heel (Active)   Wound Image   1/25/2019  7:35 AM   Site Assessment [place] 1/25/2019  7:35 AM   Lucretia-wound Assessment Intact 1/25/2019  7:35 AM   Margins Attached edges 1/25/2019  7:35 AM   Wound Length (cm) 0.8 cm 1/25/2019  7:35 AM   Wound Width (cm) 5.5 cm 1/25/2019  7:35 AM   Wound Depth (cm) 0.2 cm 1/25/2019  7:35 AM   Wound Surface Area (cm^2) 4.4 cm^2 1/25/2019  7:35 AM   Drainage Amount Other (Comment) 1/25/2019  7:35 AM   Non-staged Wound Description Partial thickness 1/25/2019  7:35 AM   Treatments Cleansed 1/25/2019  7:35 AM   Cleansing Normal Saline Irrigation 1/25/2019  7:35 AM   Periwound Protectant Skin Protectant wipes to Periwound 1/25/2019  7:35 AM   Dressing Options Hydrofiber Silver;Calcium Alginate;Nonadhesive Foam;Hypafix Tape 1/25/2019  7:35 AM   Dressing Cleansing/Solutions Normal Saline 1/25/2019  7:35 AM   Dressing Changed New 1/25/2019  7:35 AM   WOUND NURSE ONLY - Odor None 1/25/2019  7:35 AM   WOUND NURSE ONLY - Pulses 2+;DP 1/25/2019  7:35 AM   WOUND NURSE ONLY - Exposed Structures None 1/25/2019  7:35 AM   WOUND NURSE ONLY - Tissue Type and Percentage 100% pink dry tissue 1/25/2019  7:35 AM      Procedures: Viscous lidocaine 2% applied to wound prior to treatment with ~5 minute dwell time.   Debridement: None    Cleansed with: Normal saline                                                                        Periwound protected with: Skin prep   Primary dressing: Hydrofiber Silver   Secondary Dressing: Calcium Alginate, non-adhesive foam, hypafix tape.        Patient Education: Patient educated on plan and rationale  behind dressing selection. Reviewed the s/s of infection and when to present to the ER (fever, chills, nausea/vomiting, redness that spreads from the wound, sudden increased drainage or pain), leaving dressing clean/dry/intact, and when to change the dressing (if it becomes soiled, soaked, or falls off). Patient verbalized understanding to all instructions.     Professional Collaboration: Initial evaluation note sent to referring provider via EPIC.      Assessment:      Wound etiology: Trauma    Wound Progress:  Initial Evaluation    Rationale for Treatment: Hydrofiber silver to manage bioburden, absorb exudate, and maintain moist wound environment without laterally wicking exudate therefore reducing grayson-wound maceration. Calcium Alginate and non-adhesive foam to absorb drainage.    Patient tolerance/compliance: Patient tolerated treatment well, agrees with plan of care.     Complicating factors: None    Need for ongoing Advanced Wound Care services: Patient requires skilled therapeutic wound care services for product selection, application of product, debridement, close monitoring with clinical assessment to expedite wound healing.         Plan:      Treatment Plan and Recommendations:  Diagnosis/ICD10: S91.301D (ICD-10-CM) - Open wound of right heel, subsequent encounter    Procedures/CPT: 52539    Frequency: 1x / week      Treatment Goals: STG 2 Weeks  LTG 4 Weeks   Granulation Tissue: Resolved Resolved   Decrease Necrotic Tissue to:     Wound Phase:  Maturation Maturation   Decrease Size by:     Periwound:  Intact Intact   Decrease tracts/undermining by:     Decrease Pain:          At the time of each visit a thorough assessment of the patient is completed to assure the  appropriateness of our plan of care.  The dressings or modalities may need to be adapted   from the original plan to address any significant changes in the wound environment.

## 2019-01-25 NOTE — TELEPHONE ENCOUNTER
Pt walked into clinic today complaining of racing HR for the last 4 days. She says it is racing much of the time and she can feel other palpitations even when it is not racing. She does endorse, being light headed and dizzy with some chest pressure. She states she has even fallen a couple of times, but she does not know if the dizziness is from her heart or from an ear infection. She is asking for a sooner appointment to adjust Corlanor. Advised pt that with her symptoms she needs to go be seen at the ER. Pt states that every time she goes to the ER she feels she is not listened to regarding all of her previous heart issues. Advised pt that she should still go to the ER to be seen, but to be sure to ask them to involve cardiology given her past SVT ablation and subsequent PAF. Pt states understanding and will go to the ER.

## 2019-01-26 NOTE — CONSULTS
Cardiology Consult Note:    Abhishek Andino  Date & Time note created:    1/25/2019   4:06 PM     Referring MD:  Dr. Brody    Patient ID:   Name:             Kristin Balderrama   YOB: 1990  Age:                 28 y.o.  female   MRN:               [id number]                                                             Reason for Consult:      tachycardia    History of Present Illness:    28-year-old female with numerous past medical histories including chest pain and inappropriate sinus tachycardia.  She has been on ivabradine 5 mg once per day.  She said that she has been feeling her heart going very rapidly and been having palpitations.  She is also been complaining of chest pain which radiates down her left arm which is not worsened by exertion not relieved by rest and constant all day long with no variability to it whatsoever.  She does think that may be she took her ivabradine off by 4 hours and perhaps this caused her symptoms.  She also says that perhaps she ran out of this for 4 days.    Review of Systems:      Constitutional: Denies fevers, Denies weight changes  Eyes: Denies changes in vision, no eye pain  Ears/Nose/Throat/Mouth: Denies nasal congestion or sore throat   Cardiovascular: no chest pain, + palpitations   Respiratory: no shortness of breath , Denies cough  Gastrointestinal/Hepatic: Denies abdominal pain, nausea, vomiting, diarrhea, constipation or GI bleeding   Genitourinary: Denies dysuria or frequency  Musculoskeletal/Rheum: Denies  joint pain and swelling, noedema  Skin: Denies rash  Neurological: Denies headache, confusion, memory loss or focal weakness/parasthesias  Psychiatric: denies mood disorder   Endocrine: Bria thyroid problems  Heme/Oncology/Lymph Nodes: Denies enlarged lymph nodes, denies brusing or known bleeding disorder  All other systems were reviewed and are negative (AMA/CMS criteria)                Past Medical History:   Past Medical History:  "  Diagnosis Date   • Abdominal pain    • Anginal syndrome     random chest pain especially with tachycardia   • Apnea, sleep    • Arrhythmia     \"sinus tachycardia\", cariologist, Dr. Kumar; ablation 2/2016   • Arthritis     osteo   • ASTHMA     when around smoke   • Atrial fibrillation (HCC)    • Back pain    • Borderline personality disorder (HCC)    • Breath shortness     with tachycardia   • Cardiac arrhythmia    • Chickenpox    • Chronic UTI 9/18/2010   • Cough    • Daytime sleepiness    • Depression    • Diabetes (HCC)    • Diarrhea    • Disorder of thyroid    • Fall    • Fatigue    • Frequent headaches    • Gasping for breath    • Gynecological disorder     PCOS   • Headache(784.0)    • Heart burn    • History of falling    • Hypertension    • Migraine    • 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HCC)    • 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HCC)    • Nausea    • Obesity    • Pain 08-15-12    back, 7/10   • Painful joint    • PCOS (polycystic ovarian syndrome)    • Pneumonia 2012   • Psychosis (HCC)    • Renal disorder     \"kidney disease, stage 1\" nephrologist, Dr. 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   • Urinary bladder disorder     Suprapubic cath   • Urinary incontinence    • Weakness    • Wears glasses      There are no active hospital problems to display for this patient.      Past Surgical History:  Past Surgical History:   Procedure Laterality Date   • MUSCLE BIOPSY Right 1/26/2017    Procedure: MUSCLE BIOPSY - THIGH;  Surgeon: Isidro Vigil M.D.;  Location: SURGERY [facility] ORS;  Service:    • GASTROSCOPY WITH BALLOON DILATATION N/A 1/4/2017    Procedure: GASTROSCOPY WITH DILATATION;  Surgeon: Torres Vargas M.D.;  Location: SURGERY [facility] ORS;  Service:    • BOWEL STIMULATOR INSERTION  " 7/13/2016    Procedure: BOWEL STIMULATOR INSERTION FOR PERMANENT INTERSTIM SACRAL IMPLANT;  Surgeon: Joe Noyola M.D.;  Location: SURGERY [facility];  Service:    • RECOVERY  1/27/2016    Procedure: CATH LAB EP STUDY WITH SINUS NODE MODIFICATION LA;  Surgeon: Recoveryonly Surgery;  Location: SURGERY PRE-POST PROC UNIT [facility];  Service:    • OTHER CARDIAC SURGERY  1/2016    cardiac ablation   • NEURO DEST FACET L/S W/IG SNGL  4/21/2015    Performed by Reza Tabor at SURGERY [facility]   • LUMBAR FUSION ANTERIOR  8/21/2012    Performed by SUSIE SAWANT at SURGERY [facility] ORS   • ALYSSA BY LAPAROSCOPY  8/29/2010    Performed by SHAYY JOHNS at SURGERY [facility] ORS   • LAMINOTOMY     • OTHER ABDOMINAL SURGERY     • TONSILLECTOMY      tonsillectomy       Hospital Medications:  No current facility-administered medications for this encounter.      Current Outpatient Prescriptions   Medication   • nitrofurantoin monohydr macro (MACROBID) 100 MG Cap   • lactulose 10 GM/15ML Solution   • vitamin D, Ergocalciferol, (DRISDOL) 84806 units Cap capsule   • pregabalin (LYRICA) 300 MG capsule   • ziprasidone (GEODON) 80 MG Cap   • FLUoxetine (PROZAC) 10 MG Cap   • omeprazole (PRILOSEC) 20 MG delayed-release capsule   • busPIRone (BUSPAR) 5 MG tablet   • lidocaine (LIDODERM) 5 % Patch   • baclofen (LIORESAL) 10 MG Tab   • cholestyramine (QUESTRAN,PREVALITE) 4 GM Pack   • melatonin 3 MG Tab   • traZODone (DESYREL) 100 MG Tab   • nystatin (MYCOSTATIN) 395639 UNIT/GM Cream topical cream   • furosemide (LASIX) 40 MG Tab   • cyanocobalamin ([employer] VITAMIN B-12) 500 MCG tablet   • ivabradine (CORLANOR) 5 MG Tab tablet   • sodium bicarbonate (SODIUM BICARBONATE) 650 MG Tab   • aspirin EC (ECOTRIN) 81 MG Tablet Delayed Response         Current Outpatient Medications:    (Not in a hospital admission)    Medication Allergy:  Allergies   Allergen Reactions   • Cefdinir Shortness of Breath and Itching     Tolerated  "1/18/17  Tolerates ceftriaxone    • Depakote [Divalproex Sodium] Unspecified     Muscle spasms/muscle pain and weakness     • Doxycycline Anaphylaxis and Vomiting     RXN=unknown   • Abilify Unspecified     Headaches/muscle twitching     • Amitriptyline Unspecified     Headaches     • Amoxicillin Rash     Pt states \"I get a rash\".     • Ciprofloxacin Rash     Pt states \"I get a rash\".     • Clindamycin Nausea     Even with food     • Ees [Erythromycin] Vomiting and Nausea   • Flagyl [Metronidazole Hcl] Unspecified     \"eye problems\"     • Flomax [Tamsulosin Hydrochloride] Swelling   • Metformin Unspecified     Increased lactic acid      • Sulfa Drugs Hives and Rash     RXN=since childhood  PATIENT DID FINE WITH BACTRIM X 2 COURSES 10/2018   • Tape Rash     Tears skin off   coban with Tegaderm tape ok  RXN=ongoing   • Vancomycin Itching     Pt becomes flushed in face and chest.   RXN=7/10/16   • Wound Dressing Adhesive Hives     By pt report   • Cephalexin [Keflex] Rash     Pt states she gets a rash with this medication  Tolerates ceftriaxone   • Levofloxacin Unspecified     Leg muscle cramps   • Metronidazole Rash     .   • Valproic Acid Rash     .       Family History:  Family History   Problem Relation Age of Onset   • Hypertension Mother    • Sleep Apnea Mother    • Heart Disease Mother    • Other Mother         hypothryod   • Hypertension Maternal Uncle    • Heart Disease Maternal Grandmother    • Hypertension Maternal Grandmother    • No Known Problems Sister    • Other Sister         Narcolepsy;fibromyalsia;bone;nerve   • Genitourinary () Sister         endometriosis       Social History:  Social History     Social History   • Marital status: Single     Spouse name: N/A   • Number of children: N/A   • Years of education: N/A     Occupational History   • Not on file.     Social History Main Topics   • Smoking status: Never Smoker   • Smokeless tobacco: Never Used   • Alcohol use No   • Drug use: Yes     " "Frequency: 7.0 times per week     Types: Marijuana, Inhaled      Comment: Medicinal edible's   • Sexual activity: Not Currently     Birth control/ protection: Pill     Other Topics Concern   • Not on file     Social History Narrative    ** Merged History Encounter **              Physical Exam:  Vitals/ General Appearance:   Weight/BMI: Body mass index is 45.6 kg/m².  Blood pressure 139/67, pulse 65, temperature 37.2 °C (98.9 °F), temperature source Temporal, resp. rate 20, height 1.651 m (5' 5\"), weight 124.3 kg (274 lb), last menstrual period 01/11/2019, SpO2 98 %, not currently breastfeeding.  Vitals:    01/25/19 1330 01/25/19 1400 01/25/19 1401 01/25/19 1500   BP:       Pulse: 64  65    Resp: 19 18 20 20   Temp:       TempSrc:       SpO2: 99%  98%    Weight:       Height:         Oxygen Therapy:  Pulse Oximetry: 98 %    Constitutional:   Well developed, Well nourished, No acute distress  HENMT:  Normocephalic, Atraumatic, Oropharynx moist mucous membranes, No oral exudates, Nose normal.  No thyromegaly.  Eyes:  EOMI, Conjunctiva normal, No discharge.  Neck:  Normal range of motion, No cervical tenderness,  no JVD.  Cardiovascular:  Normal heart rate, Normal rhythm, No murmurs, No rubs, No gallops.   Extremitites with intact distal pulses, no cyanosis, or edema.  Lungs:  Normal breath sounds, breath sounds clear to auscultation bilaterally,  no crackles, no wheezing.   Abdomen: Bowel sounds normal, Soft, No tenderness, No guarding, No rebound, No masses, No hepatosplenomegaly.  Skin: Warm, Dry, No erythema, No rash, no induration.  Neurologic: Alert & oriented x 3, No focal deficits noted, cranial nerves II through X are grossly intact.  Psychiatric: Affect normal, Judgment normal, Mood normal.      MDM (Data Review):     Records reviewed and summarized in current documentation    Lab Data Review:  Recent Results (from the past 24 hour(s))   EKG    Collection Time: 01/25/19  9:45 AM   Result Value Ref Range    " Report       [facility]    Test Date:  2019  Pt Name:    HARLEY DE LA CRUZ              Department: ER  MRN:        [id number]                      Room:  Gender:     Female                       Technician: [id number]  :        1990                   Requested By:ER TRIAGE PROTOCOL  Order #:    [id number]                    Reading MD: LOC BENDER MD    Measurements  Intervals                                Axis  Rate:       70                           P:          19  IL:         152                          QRS:        4  QRSD:       92                           T:          -7  QT:         428  QTc:        462    Interpretive Statements  SINUS RHYTHM  BORDERLINE T ABNORMALITIES, DIFFUSE LEADS  BASELINE WANDER IN LEAD(S) I,III,aVL  Compared to ECG 2018 20:40:20  T-wave abnormality now present    Electronically Signed On 2019 14:33:27 PST by LOC BENDER MD     Troponin    Collection Time: 19 11:09 AM   Result Value Ref Range    Troponin I <0.01 0.00 - 0.04 ng/mL   Btype Natriuretic Peptide    Collection Time: 19 11:09 AM   Result Value Ref Range    B Natriuretic Peptide 22 0 - 100 pg/mL   CBC with Differential    Collection Time: 19 11:09 AM   Result Value Ref Range    WBC 5.7 4.8 - 10.8 K/uL    RBC 4.90 4.20 - 5.40 M/uL    Hemoglobin 15.1 12.0 - 16.0 g/dL    Hematocrit 44.3 37.0 - 47.0 %    MCV 90.4 81.4 - 97.8 fL    MCH 30.8 27.0 - 33.0 pg    MCHC 34.1 33.6 - 35.0 g/dL    RDW 40.0 35.9 - 50.0 fL    Platelet Count 176 164 - 446 K/uL    MPV 12.2 9.0 - 12.9 fL    Neutrophils-Polys 59.60 44.00 - 72.00 %    Lymphocytes 31.00 22.00 - 41.00 %    Monocytes 7.40 0.00 - 13.40 %    Eosinophils 1.40 0.00 - 6.90 %    Basophils 0.40 0.00 - 1.80 %    Immature Granulocytes 0.20 0.00 - 0.90 %    Nucleated RBC 0.00 /100 WBC    Neutrophils (Absolute) 3.38 2.00 - 7.15 K/uL    Lymphs (Absolute) 1.76 1.00 - 4.80 K/uL    Monos (Absolute) 0.42 0.00 - 0.85  K/uL    Eos (Absolute) 0.08 0.00 - 0.51 K/uL    Baso (Absolute) 0.02 0.00 - 0.12 K/uL    Immature Granulocytes (abs) 0.01 0.00 - 0.11 K/uL    NRBC (Absolute) 0.00 K/uL   Complete Metabolic Panel (CMP)    Collection Time: 01/25/19 11:09 AM   Result Value Ref Range    Sodium 139 135 - 145 mmol/L    Potassium 4.0 3.6 - 5.5 mmol/L    Chloride 107 96 - 112 mmol/L    Co2 23 20 - 33 mmol/L    Anion Gap 9.0 0.0 - 11.9    Glucose 93 65 - 99 mg/dL    Bun 8 8 - 22 mg/dL    Creatinine 0.65 0.50 - 1.40 mg/dL    Calcium 9.4 8.5 - 10.5 mg/dL    AST(SGOT) 19 12 - 45 U/L    ALT(SGPT) 31 2 - 50 U/L    Alkaline Phosphatase 58 30 - 99 U/L    Total Bilirubin 0.4 0.1 - 1.5 mg/dL    Albumin 4.4 3.2 - 4.9 g/dL    Total Protein 7.1 6.0 - 8.2 g/dL    Globulin 2.7 1.9 - 3.5 g/dL    A-G Ratio 1.6 g/dL   Prothrombin Time    Collection Time: 01/25/19 11:09 AM   Result Value Ref Range    PT 14.3 12.0 - 14.6 sec    INR 1.09 0.87 - 1.13   APTT    Collection Time: 01/25/19 11:09 AM   Result Value Ref Range    APTT 31.4 24.7 - 36.0 sec   Lipase    Collection Time: 01/25/19 11:09 AM   Result Value Ref Range    Lipase 24 11 - 82 U/L   D-DIMER    Collection Time: 01/25/19 11:09 AM   Result Value Ref Range    D-Dimer Screen <0.40 0.00 - 0.50 ug/mL (FEU)   ESTIMATED GFR    Collection Time: 01/25/19 11:09 AM   Result Value Ref Range    GFR If African American >60 >60 mL/min/1.73 m 2    GFR If Non African American >60 >60 mL/min/1.73 m 2   URINALYSIS CULTURE, IF INDICATED    Collection Time: 01/25/19 11:40 AM   Result Value Ref Range    Color Yellow     Character Clear     Specific Gravity 1.021 <1.035    Ph 5.5 5.0 - 8.0    Glucose Negative Negative mg/dL    Ketones Negative Negative mg/dL    Protein Negative Negative mg/dL    Bilirubin Negative Negative    Urobilinogen, Urine 0.2 Negative    Nitrite Negative Negative    Leukocyte Esterase Small (A) Negative    Occult Blood Negative Negative    Micro Urine Req Microscopic    URINE MICROSCOPIC (W/UA)     Collection Time: 01/25/19 11:40 AM   Result Value Ref Range    WBC 10-20 (A) /hpf    RBC 5-10 (A) /hpf    Bacteria Few (A) None /hpf    Epithelial Cells Few /hpf    Hyaline Cast 0-2 /lpf   Troponin in two (2) hours    Collection Time: 01/25/19  1:15 PM   Result Value Ref Range    Troponin I <0.01 0.00 - 0.04 ng/mL       Imaging/Procedures Review:    Chest Xray:  Reviewed    EKG:   Personally reviewed by myself showing normal sinus rhythm otherwise normal EKG.    ECHO:  N/A    MDM (Assessment and Plan):     There are no active hospital problems to display for this patient.    28-year-old female with inappropriate sinus tachycardia as well as a noncardiac chest pain complaint with a normal stress test within the last 18 months.  At this point I have asked her to increase her ivabradine to 1/2 pills, 7.5 mg twice per day.  I we have also provided her a new prescription for this.  We will arrange for sooner follow-up in the EP clinic.  Otherwise she safe for discharge.    Thank for you allowing me to take part in your patient's care, please call should you have any questions or would like to discuss this patient.

## 2019-01-27 LAB
BACTERIA UR CULT: NORMAL
SIGNIFICANT IND 70042: NORMAL
SITE SITE: NORMAL
SOURCE SOURCE: NORMAL

## 2019-01-28 ENCOUNTER — PATIENT OUTREACH (OUTPATIENT)
Dept: HEALTH INFORMATION MANAGEMENT | Facility: OTHER | Age: 29
End: 2019-01-28

## 2019-01-28 ASSESSMENT — ENCOUNTER SYMPTOMS: DIABETIC SYMPTOM PROGRESSION: STABLE

## 2019-01-28 NOTE — PROGRESS NOTES
TC to pt. Pt stated heel wound has healed but still sensitive to touch. Pt has established [facility] and [employer]; she uses [employer] intermittently when she runs out of food stamps. Pt reports food she receives at [employer] is highly helpful and last 2 months when supplementing food stamps. PT will consider additional social needs and is agreeable to follow up TC by SW.    Plan:  TC to pt at later time/date  Graduate if no additional social needs are identified.

## 2019-01-31 ENCOUNTER — HOSPITAL ENCOUNTER (EMERGENCY)
Facility: MEDICAL CENTER | Age: 29
End: 2019-01-31
Attending: EMERGENCY MEDICINE
Payer: MEDICARE

## 2019-01-31 ENCOUNTER — APPOINTMENT (OUTPATIENT)
Dept: RADIOLOGY | Facility: MEDICAL CENTER | Age: 29
End: 2019-01-31
Attending: EMERGENCY MEDICINE
Payer: MEDICARE

## 2019-01-31 VITALS
OXYGEN SATURATION: 96 % | DIASTOLIC BLOOD PRESSURE: 95 MMHG | SYSTOLIC BLOOD PRESSURE: 153 MMHG | BODY MASS INDEX: 44.48 KG/M2 | RESPIRATION RATE: 16 BRPM | HEIGHT: 65 IN | HEART RATE: 88 BPM | TEMPERATURE: 98.5 F | WEIGHT: 266.98 LBS

## 2019-01-31 DIAGNOSIS — R10.30 LOWER ABDOMINAL PAIN: ICD-10-CM

## 2019-01-31 DIAGNOSIS — N94.89 ADNEXAL MASS: ICD-10-CM

## 2019-01-31 LAB
ABO GROUP BLD: NORMAL
ALBUMIN SERPL BCP-MCNC: 4.7 G/DL (ref 3.2–4.9)
ALBUMIN/GLOB SERPL: 2 G/DL
ALP SERPL-CCNC: 64 U/L (ref 30–99)
ALT SERPL-CCNC: 38 U/L (ref 2–50)
ANION GAP SERPL CALC-SCNC: 8 MMOL/L (ref 0–11.9)
APTT PPP: 29.7 SEC (ref 24.7–36)
AST SERPL-CCNC: 21 U/L (ref 12–45)
BASOPHILS # BLD AUTO: 0.7 % (ref 0–1.8)
BASOPHILS # BLD: 0.05 K/UL (ref 0–0.12)
BILIRUB SERPL-MCNC: 0.5 MG/DL (ref 0.1–1.5)
BLD GP AB SCN SERPL QL: NORMAL
BUN SERPL-MCNC: 16 MG/DL (ref 8–22)
CALCIUM SERPL-MCNC: 9.7 MG/DL (ref 8.5–10.5)
CHLORIDE SERPL-SCNC: 106 MMOL/L (ref 96–112)
CO2 SERPL-SCNC: 23 MMOL/L (ref 20–33)
CREAT SERPL-MCNC: 1.01 MG/DL (ref 0.5–1.4)
EOSINOPHIL # BLD AUTO: 0.1 K/UL (ref 0–0.51)
EOSINOPHIL NFR BLD: 1.5 % (ref 0–6.9)
ERYTHROCYTE [DISTWIDTH] IN BLOOD BY AUTOMATED COUNT: 40.1 FL (ref 35.9–50)
GLOBULIN SER CALC-MCNC: 2.3 G/DL (ref 1.9–3.5)
GLUCOSE BLD-MCNC: 85 MG/DL (ref 65–99)
GLUCOSE SERPL-MCNC: 91 MG/DL (ref 65–99)
HCG SERPL QL: NEGATIVE
HCT VFR BLD AUTO: 43.2 % (ref 37–47)
HGB BLD-MCNC: 14.8 G/DL (ref 12–16)
IMM GRANULOCYTES # BLD AUTO: 0.02 K/UL (ref 0–0.11)
IMM GRANULOCYTES NFR BLD AUTO: 0.3 % (ref 0–0.9)
INR PPP: 1 (ref 0.87–1.13)
LIPASE SERPL-CCNC: 30 U/L (ref 11–82)
LYMPHOCYTES # BLD AUTO: 1.92 K/UL (ref 1–4.8)
LYMPHOCYTES NFR BLD: 28.1 % (ref 22–41)
MCH RBC QN AUTO: 30.8 PG (ref 27–33)
MCHC RBC AUTO-ENTMCNC: 34.3 G/DL (ref 33.6–35)
MCV RBC AUTO: 89.8 FL (ref 81.4–97.8)
MONOCYTES # BLD AUTO: 0.42 K/UL (ref 0–0.85)
MONOCYTES NFR BLD AUTO: 6.1 % (ref 0–13.4)
NEUTROPHILS # BLD AUTO: 4.32 K/UL (ref 2–7.15)
NEUTROPHILS NFR BLD: 63.3 % (ref 44–72)
NRBC # BLD AUTO: 0 K/UL
NRBC BLD-RTO: 0 /100 WBC
PLATELET # BLD AUTO: 187 K/UL (ref 164–446)
PMV BLD AUTO: 12.1 FL (ref 9–12.9)
POTASSIUM SERPL-SCNC: 4 MMOL/L (ref 3.6–5.5)
PROT SERPL-MCNC: 7 G/DL (ref 6–8.2)
PROTHROMBIN TIME: 13.3 SEC (ref 12–14.6)
RBC # BLD AUTO: 4.81 M/UL (ref 4.2–5.4)
RH BLD: NORMAL
SODIUM SERPL-SCNC: 137 MMOL/L (ref 135–145)
WBC # BLD AUTO: 6.8 K/UL (ref 4.8–10.8)
WBC STL QL MICRO: NORMAL

## 2019-01-31 PROCEDURE — 87046 STOOL CULTR AEROBIC BACT EA: CPT

## 2019-01-31 PROCEDURE — 82962 GLUCOSE BLOOD TEST: CPT

## 2019-01-31 PROCEDURE — 85730 THROMBOPLASTIN TIME PARTIAL: CPT

## 2019-01-31 PROCEDURE — 87045 FECES CULTURE AEROBIC BACT: CPT

## 2019-01-31 PROCEDURE — 80053 COMPREHEN METABOLIC PANEL: CPT

## 2019-01-31 PROCEDURE — A9270 NON-COVERED ITEM OR SERVICE: HCPCS | Performed by: EMERGENCY MEDICINE

## 2019-01-31 PROCEDURE — 85025 COMPLETE CBC W/AUTO DIFF WBC: CPT

## 2019-01-31 PROCEDURE — 86900 BLOOD TYPING SEROLOGIC ABO: CPT

## 2019-01-31 PROCEDURE — 700102 HCHG RX REV CODE 250 W/ 637 OVERRIDE(OP): Performed by: EMERGENCY MEDICINE

## 2019-01-31 PROCEDURE — 74177 CT ABD & PELVIS W/CONTRAST: CPT

## 2019-01-31 PROCEDURE — 86850 RBC ANTIBODY SCREEN: CPT

## 2019-01-31 PROCEDURE — 96376 TX/PRO/DX INJ SAME DRUG ADON: CPT

## 2019-01-31 PROCEDURE — 85610 PROTHROMBIN TIME: CPT

## 2019-01-31 PROCEDURE — 89055 LEUKOCYTE ASSESSMENT FECAL: CPT

## 2019-01-31 PROCEDURE — 96375 TX/PRO/DX INJ NEW DRUG ADDON: CPT

## 2019-01-31 PROCEDURE — 71045 X-RAY EXAM CHEST 1 VIEW: CPT

## 2019-01-31 PROCEDURE — 700117 HCHG RX CONTRAST REV CODE 255: Performed by: EMERGENCY MEDICINE

## 2019-01-31 PROCEDURE — 96374 THER/PROPH/DIAG INJ IV PUSH: CPT | Mod: XU

## 2019-01-31 PROCEDURE — 84703 CHORIONIC GONADOTROPIN ASSAY: CPT

## 2019-01-31 PROCEDURE — 99285 EMERGENCY DEPT VISIT HI MDM: CPT

## 2019-01-31 PROCEDURE — 700111 HCHG RX REV CODE 636 W/ 250 OVERRIDE (IP): Performed by: EMERGENCY MEDICINE

## 2019-01-31 PROCEDURE — 86901 BLOOD TYPING SEROLOGIC RH(D): CPT

## 2019-01-31 PROCEDURE — 87899 AGENT NOS ASSAY W/OPTIC: CPT

## 2019-01-31 PROCEDURE — 83690 ASSAY OF LIPASE: CPT

## 2019-01-31 RX ORDER — DICYCLOMINE HCL 20 MG
20 TABLET ORAL ONCE
Status: COMPLETED | OUTPATIENT
Start: 2019-01-31 | End: 2019-01-31

## 2019-01-31 RX ORDER — ONDANSETRON 2 MG/ML
4 INJECTION INTRAMUSCULAR; INTRAVENOUS ONCE
Status: COMPLETED | OUTPATIENT
Start: 2019-01-31 | End: 2019-01-31

## 2019-01-31 RX ORDER — ONDANSETRON 4 MG/1
4 TABLET, ORALLY DISINTEGRATING ORAL EVERY 8 HOURS PRN
Qty: 10 TAB | Refills: 0 | Status: SHIPPED | OUTPATIENT
Start: 2019-01-31 | End: 2019-03-06 | Stop reason: SDUPTHER

## 2019-01-31 RX ORDER — DICYCLOMINE HYDROCHLORIDE 10 MG/1
10 CAPSULE ORAL EVERY 6 HOURS PRN
Qty: 20 CAP | Refills: 1 | Status: SHIPPED | OUTPATIENT
Start: 2019-01-31 | End: 2019-05-27

## 2019-01-31 RX ORDER — MORPHINE SULFATE 4 MG/ML
4 INJECTION, SOLUTION INTRAMUSCULAR; INTRAVENOUS ONCE
Status: COMPLETED | OUTPATIENT
Start: 2019-01-31 | End: 2019-01-31

## 2019-01-31 RX ADMIN — ONDANSETRON 4 MG: 2 INJECTION INTRAMUSCULAR; INTRAVENOUS at 16:34

## 2019-01-31 RX ADMIN — IOHEXOL 100 ML: 350 INJECTION, SOLUTION INTRAVENOUS at 16:51

## 2019-01-31 RX ADMIN — DICYCLOMINE HYDROCHLORIDE 20 MG: 20 TABLET ORAL at 18:49

## 2019-01-31 RX ADMIN — MORPHINE SULFATE 4 MG: 4 INJECTION INTRAVENOUS at 13:33

## 2019-01-31 RX ADMIN — ONDANSETRON 4 MG: 2 INJECTION INTRAMUSCULAR; INTRAVENOUS at 13:34

## 2019-01-31 ASSESSMENT — LIFESTYLE VARIABLES: DO YOU DRINK ALCOHOL: NO

## 2019-01-31 NOTE — ED TRIAGE NOTES
"Chief Complaint   Patient presents with   • Bloody Stools     Pt reporting loose stool with \"dark blood\" x3 days.   • Nausea   • Bloated     /95   Pulse 85   Temp 36.9 °C (98.5 °F) (Temporal)   Resp 14   Ht 1.651 m (5' 5\")   Wt 121.1 kg (266 lb 15.6 oz)   LMP 01/11/2019 (Approximate)   SpO2 97%   Breastfeeding? No   BMI 44.43 kg/m²     Pt ambulated into triage, complaints as above. Pt provided supplies for stool sample. VS as above, NAD, encouraged to return to the triage nurse or tech with any new complaints or symptoms.  "

## 2019-01-31 NOTE — ED PROVIDER NOTES
"ED Provider Note    CHIEF COMPLAINT  Chief Complaint   Patient presents with   • Bloody Stools     Pt reporting loose stool with \"dark blood\" x3 days.   • Nausea   • Bloated       HPI  Kristin Balderrama is a 28 y.o. female who presents complaining of blood in her stool and abdominal pain.    Patient states she has had bright red blood with and without stool passing between 1 tablespoon and 1 cup at a time for the last 4 days on multiple occasions every day.  She reports mucus and feels bloated.  She reports nausea but denies vomiting.  She has accompanying sharp, stabbing, nonradiating, central abdominal pain.  No aggravating or alleviating factors are reported.  She also reports mild rectal pain secondary to frequent bowel movements.  Patient admits to dizziness but denies fever, chills, history of blood in the stool although she has had episodes of chronic diarrhea in the past.  She had a colonoscopy in 2015 that was negative done by Dr[facility].    Patient was on a Z-Ilya last month for otitis media and is currently taking Macrobid since Friday for a UTI.  She had an I&D of a skin abscess in the vaginal region by her GYN, Dr. Russo, on Tuesday.      ALLERGIES  Allergies   Allergen Reactions   • Cefdinir Shortness of Breath and Itching     Tolerated 1/18/17  Tolerates ceftriaxone    • Depakote [Divalproex Sodium] Unspecified     Muscle spasms/muscle pain and weakness     • Doxycycline Anaphylaxis and Vomiting     RXN=unknown   • Abilify Unspecified     Headaches/muscle twitching     • Amitriptyline Unspecified     Headaches     • Amoxicillin Rash     Pt states \"I get a rash\".     • Ciprofloxacin Rash     Pt states \"I get a rash\".     • Clindamycin Nausea     Even with food     • Ees [Erythromycin] Vomiting and Nausea   • Flagyl [Metronidazole Hcl] Unspecified     \"eye problems\"     • Flomax [Tamsulosin Hydrochloride] Swelling   • Metformin Unspecified     Increased lactic acid      • Sulfa Drugs " Hives and Rash     RXN=since childhood  PATIENT DID FINE WITH BACTRIM X 2 COURSES 10/2018   • Tape Rash     Tears skin off   coban with Tegaderm tape ok  RXN=ongoing   • Vancomycin Itching     Pt becomes flushed in face and chest.   RXN=7/10/16   • Wound Dressing Adhesive Hives     By pt report   • Cephalexin [Keflex] Rash     Pt states she gets a rash with this medication  Tolerates ceftriaxone   • Levofloxacin Unspecified     Leg muscle cramps   • Metronidazole Rash     .   • Valproic Acid Rash     .       CURRENT MEDICATIONS  Lyrica, Geodon, Prozac, Prilosec, BuSpar    PAST MEDICAL HISTORY   has a past medical history of Abdominal pain; Anginal syndrome; Apnea, sleep; Arrhythmia; Arthritis; ASTHMA; Atrial fibrillation ([facility]); Back pain; Borderline personality disorder ([facility]); Breath shortness; Cardiac arrhythmia; Chickenpox; Chronic UTI (9/18/2010); Cough; Daytime sleepiness; Depression; Diabetes ([facility]); Diarrhea; Disorder of thyroid; Fall; Fatigue; Frequent headaches; Gasping for breath; Gynecological disorder; Headache(784.0); Heart burn; History of falling; Hypertension; Migraine; 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[facility]); 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[facility]); Nausea; Obesity; Pain (08-15-12); Painful joint; PCOS (polycystic ovarian syndrome); Pneumonia (2012); Psychosis ([facility]); Renal disorder; Ringing in ears; Scoliosis; Shortness of breath; Sinus tachycardia (10/31/2013); Sleep apnea; Snoring; Tonsillitis; Tuberculosis; Urinary bladder disorder; Urinary incontinence; Weakness; and Wears glasses.    SURGICAL HISTORY   has a past surgical history that includes neuro dest facet l/s w/ig sngl (4/21/2015); recovery (1/27/2016); delmar by laparoscopy (8/29/2010); lumbar fusion anterior (8/21/2012); other cardiac surgery (1/2016); tonsillectomy; bowel stimulator insertion (7/13/2016); gastroscopy with balloon dilatation (N/A, 1/4/2017); muscle biopsy (Right, 1/26/2017); other abdominal surgery; and laminotomy.    SOCIAL  "HISTORY  Social History     Social History Main Topics   • Smoking status: Never Smoker   • Smokeless tobacco: Never Used   • Alcohol use No   • Drug use: Yes     Frequency: 7.0 times per week     Types: Marijuana, Inhaled      Comment: Medicinal edible's   • Sexual activity: Not Currently     Birth control/ protection: Pill       Family Hx:  No history of ulcerative colitis or Crohn's disease      REVIEW OF SYSTEMS  See HPI for further details.  All other systems are negative except as above in HPI.    PHYSICAL EXAM  VITAL SIGNS: /95   Pulse 88   Temp 36.9 °C (98.5 °F) (Temporal)   Resp 16   Ht 1.651 m (5' 5\")   Wt 121.1 kg (266 lb 15.6 oz)   LMP 01/11/2019 (Approximate)   SpO2 96%   Breastfeeding? No   BMI 44.43 kg/m²     General:  WDobese, nontoxic appearing in NAD; A+Ox3; V/S as above; afebrile  Skin: warm and dry; good color; no rash  HEENT: NCAT; EOMs intact; PERRL; no scleral icterus   Neck: FROM;soft  Cardiovascular: Regular heart rate and rhythm.  No murmurs, rubs, or gallops; pulses 2+ bilaterally radially and DP areas  Lungs: Clear to auscultation with good air movement bilaterally.  No wheezes, rhonchi, or rales.   Abdomen: BS present; soft; diffuse tenderness of the mid and lower abdomen; ND; no rebound, guarding, or rigidity.  No organomegaly or pulsatile mass; no CVAT; no abscess or erythema noted over the right side of the mons/suprapubic region where the patient reports she had her abscess drained  Extremities: NEWTON x 4; ecchymosis over the right arm consistent with phlebotomy sites; no pedal edema; neg Dhiraj's  Neurologic: CNs III-XII grossly intact; speech clear; distal sensation intact; strength 5/5 UE/LEs  Psychiatric: Appropriate affect, normal mood    LABS  Results for orders placed or performed during the hospital encounter of 01/31/19   COD (ADULT)   Result Value Ref Range    ABO Grouping Only O     Rh Grouping Only POS     Antibody Screen-Cod NEG    CBC WITH DIFFERENTIAL "   Result Value Ref Range    WBC 6.8 4.8 - 10.8 K/uL    RBC 4.81 4.20 - 5.40 M/uL    Hemoglobin 14.8 12.0 - 16.0 g/dL    Hematocrit 43.2 37.0 - 47.0 %    MCV 89.8 81.4 - 97.8 fL    MCH 30.8 27.0 - 33.0 pg    MCHC 34.3 33.6 - 35.0 g/dL    RDW 40.1 35.9 - 50.0 fL    Platelet Count 187 164 - 446 K/uL    MPV 12.1 9.0 - 12.9 fL    Neutrophils-Polys 63.30 44.00 - 72.00 %    Lymphocytes 28.10 22.00 - 41.00 %    Monocytes 6.10 0.00 - 13.40 %    Eosinophils 1.50 0.00 - 6.90 %    Basophils 0.70 0.00 - 1.80 %    Immature Granulocytes 0.30 0.00 - 0.90 %    Nucleated RBC 0.00 /100 WBC    Neutrophils (Absolute) 4.32 2.00 - 7.15 K/uL    Lymphs (Absolute) 1.92 1.00 - 4.80 K/uL    Monos (Absolute) 0.42 0.00 - 0.85 K/uL    Eos (Absolute) 0.10 0.00 - 0.51 K/uL    Baso (Absolute) 0.05 0.00 - 0.12 K/uL    Immature Granulocytes (abs) 0.02 0.00 - 0.11 K/uL    NRBC (Absolute) 0.00 K/uL   COMP METABOLIC PANEL   Result Value Ref Range    Sodium 137 135 - 145 mmol/L    Potassium 4.0 3.6 - 5.5 mmol/L    Chloride 106 96 - 112 mmol/L    Co2 23 20 - 33 mmol/L    Anion Gap 8.0 0.0 - 11.9    Glucose 91 65 - 99 mg/dL    Bun 16 8 - 22 mg/dL    Creatinine 1.01 0.50 - 1.40 mg/dL    Calcium 9.7 8.5 - 10.5 mg/dL    AST(SGOT) 21 12 - 45 U/L    ALT(SGPT) 38 2 - 50 U/L    Alkaline Phosphatase 64 30 - 99 U/L    Total Bilirubin 0.5 0.1 - 1.5 mg/dL    Albumin 4.7 3.2 - 4.9 g/dL    Total Protein 7.0 6.0 - 8.2 g/dL    Globulin 2.3 1.9 - 3.5 g/dL    A-G Ratio 2.0 g/dL   LIPASE   Result Value Ref Range    Lipase 30 11 - 82 U/L   PROTHROMBIN TIME   Result Value Ref Range    PT 13.3 12.0 - 14.6 sec    INR 1.00 0.87 - 1.13   APTT   Result Value Ref Range    APTT 29.7 24.7 - 36.0 sec   STOOL WBC'S   Result Value Ref Range    Stool WBC's None seen None seen   ESTIMATED GFR   Result Value Ref Range    GFR If African American >60 >60 mL/min/1.73 m 2    GFR If Non African American >60 >60 mL/min/1.73 m 2   ACCU-CHEK GLUCOSE   Result Value Ref Range    Glucose - Accu-Ck 85  "65 - 99 mg/dL     *Note: Due to a large number of results and/or encounters for the requested time period, some results have not been displayed. A complete set of results can be found in Results Review.         IMAGING  CT-ABDOMEN-PELVIS WITH   Final Result      1.  There is no CT evidence of colitis.   2.  There is no acute inflammatory process.   3.  Gallbladder is surgically absent. There is no biliary dilatation.   4.  There is mild splenomegaly.   5.  There is a hypodense right adnexal lesion, probably cyst although measurement is fat density suggesting there may be some associated fat in the lesion versus volume averaging with adjacent pelvic fat.      DX-CHEST-PORTABLE (1 VIEW)   Final Result      Negative single view of the chest.            MEDICAL RECORD  I have reviewed patient's medical record and pertinent results are listed below.      COURSE & MEDICAL DECISION MAKING  I have reviewed any medical record information, laboratory studies and radiographic results as noted.    Kristin Balderrama is a 28 y.o. female who presents complaining of bloody diarrhea and abdominal pain.  Patient is afebrile and not tachycardic or hypotensive.  She does not report melena.  Patient does have generalized tenderness of the lower abdomen.  Given her report of symptoms, I feel she should have a CT scan to evaluate for colitis.    White blood cell count is normal, hemoglobin is normal at 14.8, no evidence of dehydration on her chemistry panel.    CT is negative for colitis.  There is a right adnexal lesion.    Pt was re-evaluated at 6:20 PM  Advised of results and plan to d/c with f/u with PCP, GYN and GI.  Patient is aware of the right adnexal lesion and is having an ultrasound with her GYN next week.  Abdominal pain return precautions were discussed.  Patient states she feels slightly \"woozy\" due to the pain.  Bentyl was administered.  Patient did tolerate p.o. challenge.    Patient felt improved and was " discharged.    HCG negative    FINAL IMPRESSION  1. Lower abdominal pain    2. Adnexal mass      Electronically signed by: Catalina Bull, 1/31/2019 1:02 PM

## 2019-01-31 NOTE — PROGRESS NOTES
"1/28/19 0909    SBAR Documentation: Situation, Background, Assessment, Recommendation     Situation    Incoming call from patient, returning call from [facility] RN last week.    Background       Patient visited ED last week for chest pain. Cardiac workup inconclusive, patient + for UTI and discharged with antibiotics.    Assessment    Patient reports she went into the ED after 4 days of HR 120s-130s and chest pain. Patient states she is feeling a little better but still has lightheadedness. BP has been labile with lowest at 100/70. Patient states she will call to schedule follow up with her cardiologist. Patient states she was in the sun for 10 minutes when waiting for the food pantry and got fatigued. Patient reports being lightheaded/dizzy when rising sometimes. Discussed postural hypotension prevention such as rising slowly, taking extra time when changing positions, sitting down when possible for tasks, gently exercise while lying down, and staying well hydrated.     Patient has appointment tomorrow with gynecologist for a suspected infected hair follicle in [place] area. Patient states \"I didn't feel it--I don't have a lot of sensation down there\" to address it sooner.     Patient on macrobid for UTI. Discussed proper hygiene to prevent UTI as well as staying well hydrated. Patient states due to her back, she cannot really wipe well. Discussed where she could purchase a wipe assist device-she is concerned about affording one. She needs to [facility] her application off at [facility] for other equipment/supplies--encouraged patient to inquire to see if they have personal care items available through [facility].       Patient able to use food as medicine prescription to supplement her food stamps. She is working on eating healthier--low sodium, low fat but has had a weight gain of 10 lbs in last 2 weeks.    Recommendation --Encouraged patient to schedule follow up appointments with PCP and cardiologist as needed and return to " ED for unresolved/worsening symptoms.   --CCM RN to follow up in 2 weeks or sooner, if needed.   --CCM RN to mail patient education on UTI in Women (Yvette) and Postural Hypotension (MAYTE COHEN)   Escalation Protocol: Physician Office Visit Appointment

## 2019-02-01 LAB
E COLI SXT1+2 STL IA: NORMAL
SIGNIFICANT IND 70042: NORMAL
SITE SITE: NORMAL
SOURCE SOURCE: NORMAL

## 2019-02-01 NOTE — ED NOTES
ERP at bedside to discuss results.  Pt reporting abd cramping.  Medication ordered prior to discharge.

## 2019-02-01 NOTE — ED NOTES
Patient was educated on discharge instructions.  Patient was informed about diagnosis, symptom management, risks, and home care instructions.  Patient verbalized understanding and signed discharge instructions. Prescriptions sent to pharmacy. Copy of discharge instructions in chart.  Patient ambulated out with steady gait.  Patient has personal belongings and PIV removed, tip intact.

## 2019-02-01 NOTE — ED NOTES
Pt medicated per MAR. Pt updated on POC.  Pt sitting in chair, will continue to monitor for abd cramping.

## 2019-02-01 NOTE — DISCHARGE INSTRUCTIONS
Follow-up with your primary care provider tomorrow for recheck and gastroenterology referral for colonoscopy for your bloody diarrhea    Follow-up with your gynecologist about the ovarian cyst    Return to the ER for worsening pain, continued bleeding, fever, or other concerns

## 2019-02-03 LAB
BACTERIA STL CULT: NORMAL
E COLI SXT1+2 STL IA: NORMAL
SIGNIFICANT IND 70042: NORMAL
SITE SITE: NORMAL
SOURCE SOURCE: NORMAL

## 2019-02-05 ENCOUNTER — DOCUMENTATION (OUTPATIENT)
Dept: BEHAVIORAL HEALTH | Facility: CLINIC | Age: 29
End: 2019-02-05

## 2019-02-08 ENCOUNTER — OFFICE VISIT (OUTPATIENT)
Dept: MEDICAL GROUP | Facility: MEDICAL CENTER | Age: 29
End: 2019-02-08
Payer: MEDICARE

## 2019-02-08 VITALS
HEIGHT: 65 IN | BODY MASS INDEX: 43.99 KG/M2 | HEART RATE: 73 BPM | DIASTOLIC BLOOD PRESSURE: 64 MMHG | OXYGEN SATURATION: 96 % | TEMPERATURE: 98.4 F | WEIGHT: 264 LBS | SYSTOLIC BLOOD PRESSURE: 122 MMHG

## 2019-02-08 DIAGNOSIS — K64.9 HEMORRHOIDS, UNSPECIFIED HEMORRHOID TYPE: ICD-10-CM

## 2019-02-08 DIAGNOSIS — R00.0 SINUS TACHYCARDIA: Chronic | ICD-10-CM

## 2019-02-08 PROCEDURE — 99214 OFFICE O/P EST MOD 30 MIN: CPT | Performed by: INTERNAL MEDICINE

## 2019-02-08 RX ORDER — HYDROCORTISONE ACETATE 25 MG/1
25 SUPPOSITORY RECTAL EVERY 12 HOURS
Qty: 10 SUPPOSITORY | Refills: 0 | Status: SHIPPED | OUTPATIENT
Start: 2019-02-08 | End: 2019-05-27

## 2019-02-08 RX ORDER — METHOCARBAMOL 500 MG/1
500 TABLET, FILM COATED ORAL 3 TIMES DAILY
COMMUNITY
End: 2019-05-27

## 2019-02-08 NOTE — PROGRESS NOTES
CC: Follow-up hemorrhoids and tachycardia.    HPI:   Kristin presents today with the following.    1. Hemorrhoids, unspecified hemorrhoid type  Presents after going to the emergency room for bloody stools was examined and noted to have internal hemorrhoids.  She has seen gastroenterology with no recommendations for banding.  She is slightly constipated.    2. Sinus tachycardia  She also has a constant sinus tachycardia for which she is on medications.  Cardiology instructed her to go to 1-1/2 pills twice a day and she is running out and cannot get a hold of cardiology.  She reports it has been helpful slowing her heart rate denying any chest pains or palpitations no dizziness from medication.      Patient Active Problem List    Diagnosis Date Noted   • Left leg weakness 07/14/2018     Priority: High   • Controlled type 2 diabetes mellitus without complication, without long-term current use of insulin ([facility]) 04/26/2017     Priority: High   • Sinus tachycardia 10/31/2013     Priority: High   • Chronic inflammatory arthritis 05/23/2016     Priority: Medium   • Morbid obesity with BMI of 45.0-49.9, adult ([facility]) 10/24/2017     Priority: Low   • Depression 10/28/2016     Priority: Low   • Schizophrenia ([facility]) 10/27/2016     Priority: Low   • Psychosis, schizophrenia, simple ([facility]) 09/29/2016     Priority: Low     Class: Chronic   • Acquired hypothyroidism 11/23/2015     Priority: Low   • PCOS (polycystic ovarian syndrome) 11/23/2015     Priority: Low   • Progressive focal motor weakness 06/28/2015     Priority: Low   • Fatty liver disease, nonalcoholic 01/19/2015     Priority: Low   • Anxiety 12/16/2014     Priority: Low   • Knee pain, right 02/13/2014     Priority: Low   • Neurogenic bladder 04/02/2011     Priority: Low   • Chronic UTI 09/18/2010     Priority: Low   • Borderline personality disorder in adult ([facility]) 09/18/2010     Priority: Low   • Asymptomatic bacteriuria 10/26/2018   • Palpitations 10/01/2018   • Chronic  respiratory failure with hypoxia, on home oxygen therapy ([facility]) 08/08/2018   • Transaminitis 08/08/2018   • TONYA (obstructive sleep apnea) 01/09/2018   • Functional diarrhea 01/05/2018   • Breast wound 11/06/2017   • Intractable episodic cluster headache 09/14/2017   • Rash 06/09/2017   • Hashimoto's encephalopathy 05/17/2017   • Fall at home 05/13/2017   • On home oxygen therapy 04/15/2017   • Weakness of right upper extremity 02/23/2017   • Chronic suprapubic catheter ([facility]) 02/16/2017   • Hypovitaminosis D 11/29/2016   • Chronic pain syndrome 10/27/2016   • Bowel and bladder incontinence 10/27/2016   • Galactorrhea 07/22/2016   • Elevated sedimentation rate 06/27/2016   • Weakness of both lower extremities 06/22/2016   • Vitamin D deficiency 05/21/2016   • Morbidly obese ([facility]) 03/07/2016   • Scoliosis 03/07/2016   • GERD (gastroesophageal reflux disease) 03/07/2016   • Peripheral neuropathy ([facility]) 03/06/2016   • H/O prior ablation treatment 02/10/2016       Current Outpatient Prescriptions   Medication Sig Dispense Refill   • methocarbamol (ROBAXIN) 500 MG Tab Take 500 mg by mouth 3 times a day.     • hydrocortisone (ANUSOL-HC) 25 MG Suppos Insert 1 Suppository in rectum every 12 hours. 10 Suppository 0   • Ivabradine HCl 7.5 MG Tab Take 7.5 mg by mouth 2 Times a Day. 60 Tab 2   • ondansetron (ZOFRAN ODT) 4 MG TABLET DISPERSIBLE Take 1 Tab by mouth every 8 hours as needed. 10 Tab 0   • dicyclomine (BENTYL) 10 MG Cap Take 1 Cap by mouth every 6 hours as needed (abdominal cramping). 20 Cap 1   • lactulose 10 GM/15ML Solution Take 10 g by mouth 2 times a day as needed.     • vitamin D, Ergocalciferol, (DRISDOL) 73125 units Cap capsule Take 50,000 Units by mouth every 7 days.     • pregabalin (LYRICA) 300 MG capsule Take 1 Cap by mouth 2 times a day for 30 days. 60 Cap 0   • ziprasidone (GEODON) 80 MG Cap Take 1 Cap by mouth 2 Times a Day. 60 Cap 2   • FLUoxetine (PROZAC) 10 MG Cap TAKE ONE CAPSULE BY MOUTH ONCE A  DAY 30 Cap 2   • omeprazole (PRILOSEC) 20 MG delayed-release capsule Take 1 Cap by mouth every day. 90 Cap 3   • busPIRone (BUSPAR) 5 MG tablet Take 1 Tab by mouth 2 times a day. 60 Tab 5   • lidocaine (LIDODERM) 5 % Patch Apply 1 Patch to skin as directed every 24 hours. 10 Patch 2   • cholestyramine (QUESTRAN,PREVALITE) 4 GM Pack Take 4 g by mouth every morning as needed.     • melatonin 3 MG Tab Take 6 mg by mouth every bedtime.     • traZODone (DESYREL) 100 MG Tab Take 100 mg by mouth every bedtime.     • nystatin (MYCOSTATIN) 912604 UNIT/GM Cream topical cream Apply 0.0001 g to affected area(s) 2 times a day. 1 Tube 3   • furosemide (LASIX) 40 MG Tab Take 80 mg by mouth every day.     • cyanocobalamin ([employer] VITAMIN B-12) 500 MCG tablet Take 500 mcg by mouth every day.     • sodium bicarbonate (SODIUM BICARBONATE) 650 MG Tab Take 650 mg by mouth 2 times a day.     • aspirin EC (ECOTRIN) 81 MG Tablet Delayed Response Take 1 Tab by mouth every day. 30 Tab 6   • baclofen (LIORESAL) 10 MG Tab TAKE ONE TABLET BY MOUTH THREE TIMES DAILY (Patient not taking: Reported on 2/8/2019) 90 Tab 4     No current facility-administered medications for this visit.          Allergies as of 02/08/2019 - Reviewed 02/08/2019   Allergen Reaction Noted   • Cefdinir Shortness of Breath and Itching 03/01/2016   • Depakote [divalproex sodium] Unspecified 06/14/2010   • Doxycycline Anaphylaxis and Vomiting 08/15/2012   • Abilify Unspecified 01/17/2013   • Amitriptyline Unspecified 10/31/2013   • Amoxicillin Rash 09/18/2010   • Ciprofloxacin Rash 12/17/2009   • Clindamycin Nausea 02/02/2011   • Ees [erythromycin] Vomiting and Nausea 08/28/2010   • Flagyl [metronidazole hcl] Unspecified 03/31/2011   • Flomax [tamsulosin hydrochloride] Swelling 09/24/2009   • Metformin Unspecified 07/23/2013   • Sulfa drugs Hives and Rash 09/18/2010   • Tape Rash 08/15/2012   • Vancomycin Itching 07/10/2016   • Wound dressing adhesive Hives 01/12/2018   •  "Cephalexin [keflex] Rash 01/01/2017   • Levofloxacin Unspecified 10/27/2016   • Metronidazole Rash 03/30/2017   • Valproic acid Rash 03/30/2017        ROS: Denies Chest pain, SOB, LE edema.    /64 (BP Location: Left arm, Patient Position: Sitting)   Pulse 73   Temp 36.9 °C (98.4 °F)   Ht 1.651 m (5' 5\")   Wt 119.7 kg (264 lb)   LMP 01/11/2019 (Approximate)   SpO2 96%   BMI 43.93 kg/m²     Physical Exam:  Gen:         Alert and oriented, No apparent distress.  Neck:        No Lymphadenopathy or Bruits.  Lungs:     Clear to auscultation bilaterally  CV:          Regular rate and rhythm. No murmurs, rubs or gallops.               Ext:          No clubbing, cyanosis, edema.      Assessment and Plan.   28 y.o. female with the following issues.    1. Hemorrhoids, unspecified hemorrhoid type  Have given prescription for suppositories recommended for increasing fluid intake and fiber.    - hydrocortisone (ANUSOL-HC) 25 MG Suppos; Insert 1 Suppository in rectum every 12 hours.  Dispense: 10 Suppository; Refill: 0    2. Sinus tachycardia  Have prescribed increased dosing as cardiology noted in their last note.  She will now be ivabradine 7.5mg bid sent prescription to pharmacy.      "

## 2019-02-12 ENCOUNTER — PATIENT OUTREACH (OUTPATIENT)
Dept: HEALTH INFORMATION MANAGEMENT | Facility: OTHER | Age: 29
End: 2019-02-12

## 2019-02-12 NOTE — PROGRESS NOTES
SBAR Documentation: Situation, Background, Assessment, Recommendation     Situation    Routine outgoing call to patient to follow up on current status.    Background       At last call, patient having dizziness/lightheadedness.     Per chart review, patient in ED for blood stool--determined to be internal hemorrhoids.    Assessment    Per patient, she is doing well this week. She has not yet been able to  new prescription for Anusol, but does not have any complaints.     Patient states her OBGYN drained the hair follicle/abcess that was concerning her at last outreach and it is doing well now.     Patient denies any other needs at this time.    Recommendation [facility] RN to follow up in 1 month or sooner, if needed.    Escalation Protocol: No Escalation Needed

## 2019-02-14 ENCOUNTER — OFFICE VISIT (OUTPATIENT)
Dept: MEDICAL GROUP | Facility: MEDICAL CENTER | Age: 29
End: 2019-02-14
Payer: MEDICARE

## 2019-02-14 VITALS
SYSTOLIC BLOOD PRESSURE: 112 MMHG | OXYGEN SATURATION: 100 % | HEART RATE: 74 BPM | HEIGHT: 65 IN | BODY MASS INDEX: 44.82 KG/M2 | TEMPERATURE: 98.5 F | WEIGHT: 269 LBS | DIASTOLIC BLOOD PRESSURE: 68 MMHG

## 2019-02-14 DIAGNOSIS — R05.9 COUGH: ICD-10-CM

## 2019-02-14 DIAGNOSIS — J02.9 SORE THROAT: ICD-10-CM

## 2019-02-14 LAB
INT CON NEG: NEGATIVE
INT CON POS: POSITIVE
S PYO AG THROAT QL: NORMAL

## 2019-02-14 PROCEDURE — 99213 OFFICE O/P EST LOW 20 MIN: CPT | Performed by: INTERNAL MEDICINE

## 2019-02-14 PROCEDURE — 87880 STREP A ASSAY W/OPTIC: CPT | Performed by: INTERNAL MEDICINE

## 2019-02-14 NOTE — PROGRESS NOTES
CC: Sore throat and cough.    HPI:   Kristin presents today with the following.    1. Cough  Presents complaining of 3 days of symptoms.  Most prominent is a sore throat.  She does complain of postnasal drip some mild congestion.  Cough is present only slightly productive of clear sputum.  She denies any objective fevers but does feel slightly warm.  Denies any difficulty breathing no other localizing infectious symptoms        Patient Active Problem List    Diagnosis Date Noted   • Left leg weakness 07/14/2018     Priority: High   • Controlled type 2 diabetes mellitus without complication, without long-term current use of insulin ([facility]) 04/26/2017     Priority: High   • Sinus tachycardia 10/31/2013     Priority: High   • Chronic inflammatory arthritis 05/23/2016     Priority: Medium   • Morbid obesity with BMI of 45.0-49.9, adult ([facility]) 10/24/2017     Priority: Low   • Depression 10/28/2016     Priority: Low   • Schizophrenia ([facility]) 10/27/2016     Priority: Low   • Psychosis, schizophrenia, simple ([facility]) 09/29/2016     Priority: Low     Class: Chronic   • Acquired hypothyroidism 11/23/2015     Priority: Low   • PCOS (polycystic ovarian syndrome) 11/23/2015     Priority: Low   • Progressive focal motor weakness 06/28/2015     Priority: Low   • Fatty liver disease, nonalcoholic 01/19/2015     Priority: Low   • Anxiety 12/16/2014     Priority: Low   • Knee pain, right 02/13/2014     Priority: Low   • Neurogenic bladder 04/02/2011     Priority: Low   • Chronic UTI 09/18/2010     Priority: Low   • Borderline personality disorder in adult ([facility]) 09/18/2010     Priority: Low   • Asymptomatic bacteriuria 10/26/2018   • Palpitations 10/01/2018   • Chronic respiratory failure with hypoxia, on home oxygen therapy ([facility]) 08/08/2018   • Transaminitis 08/08/2018   • TONYA (obstructive sleep apnea) 01/09/2018   • Functional diarrhea 01/05/2018   • Breast wound 11/06/2017   • Intractable episodic cluster headache 09/14/2017   • Rash  06/09/2017   • Hashimoto's encephalopathy 05/17/2017   • Fall at home 05/13/2017   • On home oxygen therapy 04/15/2017   • Weakness of right upper extremity 02/23/2017   • Chronic suprapubic catheter (HCC) 02/16/2017   • Hypovitaminosis D 11/29/2016   • Chronic pain syndrome 10/27/2016   • Bowel and bladder incontinence 10/27/2016   • Galactorrhea 07/22/2016   • Elevated sedimentation rate 06/27/2016   • Weakness of both lower extremities 06/22/2016   • Vitamin D deficiency 05/21/2016   • Morbidly obese (HCC) 03/07/2016   • Scoliosis 03/07/2016   • GERD (gastroesophageal reflux disease) 03/07/2016   • Peripheral neuropathy ([facility]) 03/06/2016   • H/O prior ablation treatment 02/10/2016       Current Outpatient Prescriptions   Medication Sig Dispense Refill   • methocarbamol (ROBAXIN) 500 MG Tab Take 500 mg by mouth 3 times a day.     • hydrocortisone (ANUSOL-HC) 25 MG Suppos Insert 1 Suppository in rectum every 12 hours. 10 Suppository 0   • Ivabradine HCl 7.5 MG Tab Take 7.5 mg by mouth 2 Times a Day. 60 Tab 2   • ondansetron (ZOFRAN ODT) 4 MG TABLET DISPERSIBLE Take 1 Tab by mouth every 8 hours as needed. 10 Tab 0   • dicyclomine (BENTYL) 10 MG Cap Take 1 Cap by mouth every 6 hours as needed (abdominal cramping). 20 Cap 1   • lactulose 10 GM/15ML Solution Take 10 g by mouth 2 times a day as needed.     • vitamin D, Ergocalciferol, (DRISDOL) 51433 units Cap capsule Take 50,000 Units by mouth every 7 days.     • pregabalin (LYRICA) 300 MG capsule Take 1 Cap by mouth 2 times a day for 30 days. 60 Cap 0   • ziprasidone (GEODON) 80 MG Cap Take 1 Cap by mouth 2 Times a Day. 60 Cap 2   • FLUoxetine (PROZAC) 10 MG Cap TAKE ONE CAPSULE BY MOUTH ONCE A DAY 30 Cap 2   • omeprazole (PRILOSEC) 20 MG delayed-release capsule Take 1 Cap by mouth every day. 90 Cap 3   • busPIRone (BUSPAR) 5 MG tablet Take 1 Tab by mouth 2 times a day. 60 Tab 5   • lidocaine (LIDODERM) 5 % Patch Apply 1 Patch to skin as directed every 24 hours. 10  Patch 2   • baclofen (LIORESAL) 10 MG Tab TAKE ONE TABLET BY MOUTH THREE TIMES DAILY (Patient not taking: Reported on 2/8/2019) 90 Tab 4   • cholestyramine (QUESTRAN,PREVALITE) 4 GM Pack Take 4 g by mouth every morning as needed.     • melatonin 3 MG Tab Take 6 mg by mouth every bedtime.     • traZODone (DESYREL) 100 MG Tab Take 100 mg by mouth every bedtime.     • nystatin (MYCOSTATIN) 933325 UNIT/GM Cream topical cream Apply 0.0001 g to affected area(s) 2 times a day. 1 Tube 3   • furosemide (LASIX) 40 MG Tab Take 80 mg by mouth every day.     • cyanocobalamin ([employer] VITAMIN B-12) 500 MCG tablet Take 500 mcg by mouth every day.     • sodium bicarbonate (SODIUM BICARBONATE) 650 MG Tab Take 650 mg by mouth 2 times a day.     • aspirin EC (ECOTRIN) 81 MG Tablet Delayed Response Take 1 Tab by mouth every day. 30 Tab 6     No current facility-administered medications for this visit.          Allergies as of 02/14/2019 - Reviewed 02/14/2019   Allergen Reaction Noted   • Cefdinir Shortness of Breath and Itching 03/01/2016   • Depakote [divalproex sodium] Unspecified 06/14/2010   • Doxycycline Anaphylaxis and Vomiting 08/15/2012   • Abilify Unspecified 01/17/2013   • Amitriptyline Unspecified 10/31/2013   • Amoxicillin Rash 09/18/2010   • Ciprofloxacin Rash 12/17/2009   • Clindamycin Nausea 02/02/2011   • Ees [erythromycin] Vomiting and Nausea 08/28/2010   • Flagyl [metronidazole hcl] Unspecified 03/31/2011   • Flomax [tamsulosin hydrochloride] Swelling 09/24/2009   • Metformin Unspecified 07/23/2013   • Sulfa drugs Hives and Rash 09/18/2010   • Tape Rash 08/15/2012   • Vancomycin Itching 07/10/2016   • Wound dressing adhesive Hives 01/12/2018   • Cephalexin [keflex] Rash 01/01/2017   • Levofloxacin Unspecified 10/27/2016   • Metronidazole Rash 03/30/2017   • Valproic acid Rash 03/30/2017        ROS: Denies Chest pain, SOB, LE edema.    /68 (BP Location: Left arm, Patient Position: Sitting)   Pulse 74   Temp 36.9 °C  "(98.5 °F)   Ht 1.651 m (5' 5\")   Wt 122 kg (269 lb)   SpO2 100%   BMI 44.76 kg/m²     Physical Exam:  Gen:         Alert and oriented, No apparent distress.  HEENT     TMs clear bilaterally oropharynx showing some slight exudates on tonsils no erythema.  Neck:        No Lymphadenopathy or Bruits.  Lungs:     Clear to auscultation bilaterally  CV:          Regular rate and rhythm. No murmurs, rubs or gallops.               Ext:          No clubbing, cyanosis, edema.    Rapid strep negative    Assessment and Plan.   28 y.o. female with the following issues.    1. Cough/. Sore throat  Likely viral in etiology. Have recommended over-the-counter pain control and symptom relief. Patient will followup if spiking fevers or symptoms worsen.  - POCT Rapid Strep A      "

## 2019-02-21 ENCOUNTER — PATIENT OUTREACH (OUTPATIENT)
Dept: HEALTH INFORMATION MANAGEMENT | Facility: OTHER | Age: 29
End: 2019-02-21

## 2019-02-21 ENCOUNTER — TELEPHONE (OUTPATIENT)
Dept: MEDICAL GROUP | Facility: MEDICAL CENTER | Age: 29
End: 2019-02-21

## 2019-02-21 DIAGNOSIS — R30.0 DYSURIA: ICD-10-CM

## 2019-02-21 NOTE — PROGRESS NOTES
"SBAR Documentation: Situation, Background, Assessment, Recommendation     Situation    Incoming call from patient. She had misplaced [facility] phone numbers and was verifying she had the correct contact information.    Background       Patient states she visited her PCP this week for a respiratory virus.    Assessment    Patient states she has a lot of dental bills at this time. She is going to [facility] and has additional fees of $145 + $40 sliding scale fee next week. Patient states she will have to find someone who can loan her the money as she is not able to save enough by that time.     CCM RN answered patient's questions regarding [employer] prescription-patient states she needs additional food for this month. Reminded patient that [employer] prescription allows her to visit the food pantries involved multiple times per month if needed.     Patient states she has ongoing urinary burning and bladder discomfort. She states she has her menses this week and is worried it would interfere with a UA. Encouraged patient to contact PCP office for s/s of UTI. Again discussed proper hygiene and importance of wiping front to back. Patient states due to back issues it is difficult for her to wipe front to back and that she either has to wipe back to front or \"Dab\" area off. Again encourage patient to look into a wipe aid device. Discussed female anatomy and increased risk for bacterial contamination.    Recommendation [facility] RN to follow up with patient in one month or sooner, if needed.    Escalation Protocol: No Escalation Needed (Comment: recommended patient call PCP for recommendation re: dysuria)     "

## 2019-02-21 NOTE — TELEPHONE ENCOUNTER
VOICEMAIL  1. Caller Name: Kristin                      Call Back Number: [phone]    2. Message: Patient LVM that she believes she has a UTI. She is also on her period and would like to know if she can still test and what she should do. Please advise.    3. Patient approves office to leave a detailed voicemail/MyChart message: N\A

## 2019-02-22 ENCOUNTER — HOSPITAL ENCOUNTER (OUTPATIENT)
Dept: LAB | Facility: MEDICAL CENTER | Age: 29
End: 2019-02-22
Attending: INTERNAL MEDICINE
Payer: MEDICARE

## 2019-02-22 DIAGNOSIS — R30.0 DYSURIA: ICD-10-CM

## 2019-02-22 PROCEDURE — 87086 URINE CULTURE/COLONY COUNT: CPT

## 2019-02-22 PROCEDURE — 81001 URINALYSIS AUTO W/SCOPE: CPT

## 2019-02-23 LAB
APPEARANCE UR: ABNORMAL
BACTERIA #/AREA URNS HPF: ABNORMAL /HPF
BILIRUB UR QL STRIP.AUTO: NEGATIVE
COLOR UR: ABNORMAL
EPI CELLS #/AREA URNS HPF: ABNORMAL /HPF
GLUCOSE UR STRIP.AUTO-MCNC: NEGATIVE MG/DL
KETONES UR STRIP.AUTO-MCNC: NEGATIVE MG/DL
LEUKOCYTE ESTERASE UR QL STRIP.AUTO: ABNORMAL
MICRO URNS: ABNORMAL
NITRITE UR QL STRIP.AUTO: NEGATIVE
PH UR STRIP.AUTO: 7 [PH]
PROT UR QL STRIP: 100 MG/DL
RBC # URNS HPF: >150 /HPF
RBC UR QL AUTO: ABNORMAL
SP GR UR STRIP.AUTO: 1.02
UROBILINOGEN UR STRIP.AUTO-MCNC: 1 MG/DL
WBC #/AREA URNS HPF: ABNORMAL /HPF

## 2019-02-25 ENCOUNTER — TELEPHONE (OUTPATIENT)
Dept: MEDICAL GROUP | Facility: MEDICAL CENTER | Age: 29
End: 2019-02-25

## 2019-02-25 ENCOUNTER — PATIENT OUTREACH (OUTPATIENT)
Dept: HEALTH INFORMATION MANAGEMENT | Facility: OTHER | Age: 29
End: 2019-02-25

## 2019-02-25 DIAGNOSIS — R30.0 DYSURIA: ICD-10-CM

## 2019-02-25 RX ORDER — ZIPRASIDONE HYDROCHLORIDE 80 MG/1
CAPSULE ORAL
Qty: 60 CAP | Refills: 5 | Status: SHIPPED | OUTPATIENT
Start: 2019-02-25 | End: 2019-05-27

## 2019-02-25 ASSESSMENT — ENCOUNTER SYMPTOMS: OCCASIONAL FEELINGS OF UNSTEADINESS: 1

## 2019-02-25 NOTE — PROGRESS NOTES
TC to pt. Pt reports sleeping but willing to talk. Pt inquired about [employer] frequency. SW educated pt she may attend food pantry multiple times a month within the given times/locations on sheet. Pt denied any additional social needs. [facility] possible resources DXC for Medicaid Prior Authorization of home hygiene services as pt disclosed challenges with wiping which may be leading to UTIs. Pt agreeable to receiving contact information for independent follow up if she feels service is necessary.    Goal: Kristin identifies potential community resources and services available to help meet her basic needs over the next  90 days.  Barriers: System navigation, financial limitations, physical limitations, aging primary care provider.  Interventions:  KRYSTIAN will educate Kristin on various resources within the community to support her basic needs on a fixed, low income.    11/16/2018 Educated on [employer] open list   11/16/2018 Faxed RTC Access application to PCP's office w/ routed message   11/26/2018 Medicaid-based cell coverage reviewed ([employer] and [employer] contact information provided)   01/28/2019 Pt reports using [employer]   02/25/2019 Provided [facility] Medicaid Prior Auth contact info for hygiene services ([phone])     KRYSTIAN will further conversation surrounding Kristin's social determinants and their impact on her ability to manage her medical care.   11/16/2018 Complete  Start Date: 11/16/18  End Date:  02/25/19    Plan:  KRYSTIAN will graduate pt from [facility] as no additional social needs have been identified. Pt educated on ability to self refer by calling KRYSTIAN directly or speaking with [facility] SONYA Ramsey.

## 2019-02-25 NOTE — TELEPHONE ENCOUNTER
Please see other telephone encounter. Patient notified that we are still waiting for the rest of the culture results to come back.

## 2019-02-25 NOTE — TELEPHONE ENCOUNTER
Spoke with lab and they stated the culture did not indicate that it needed to be cultured. The explanation was not clear but if you want a culture run, it will need to be ordered separately and it will be run regardless. They can still culture the patient's urine. You will need to place an order and I can call the lab and let them know. Please advise.

## 2019-02-25 NOTE — TELEPHONE ENCOUNTER
VOICEMAIL  1. Caller Name: Kristin                      Call Back Number: [phone]    2. Message: Patient called and would like to know the results of her UA and culture. Please advise.    3. Patient approves office to leave a detailed voicemail/MyChart message: N\A

## 2019-02-27 ENCOUNTER — OFFICE VISIT (OUTPATIENT)
Dept: MEDICAL GROUP | Facility: MEDICAL CENTER | Age: 29
End: 2019-02-27
Payer: MEDICARE

## 2019-02-27 ENCOUNTER — TELEPHONE (OUTPATIENT)
Dept: MEDICAL GROUP | Facility: MEDICAL CENTER | Age: 29
End: 2019-02-27

## 2019-02-27 ENCOUNTER — HOSPITAL ENCOUNTER (OUTPATIENT)
Facility: MEDICAL CENTER | Age: 29
End: 2019-02-27
Attending: INTERNAL MEDICINE
Payer: MEDICARE

## 2019-02-27 VITALS
RESPIRATION RATE: 16 BRPM | OXYGEN SATURATION: 95 % | SYSTOLIC BLOOD PRESSURE: 128 MMHG | WEIGHT: 269 LBS | DIASTOLIC BLOOD PRESSURE: 72 MMHG | HEIGHT: 65 IN | TEMPERATURE: 98.6 F | BODY MASS INDEX: 44.82 KG/M2 | HEART RATE: 82 BPM

## 2019-02-27 DIAGNOSIS — R30.0 DYSURIA: ICD-10-CM

## 2019-02-27 LAB
BACTERIA UR CULT: NORMAL
SIGNIFICANT IND 70042: NORMAL
SITE SITE: NORMAL
SOURCE SOURCE: NORMAL

## 2019-02-27 PROCEDURE — 87086 URINE CULTURE/COLONY COUNT: CPT

## 2019-02-27 PROCEDURE — 99213 OFFICE O/P EST LOW 20 MIN: CPT | Performed by: INTERNAL MEDICINE

## 2019-02-27 RX ORDER — ALBUTEROL SULFATE 90 UG/1
2 AEROSOL, METERED RESPIRATORY (INHALATION) EVERY 6 HOURS PRN
Qty: 8.5 G | Refills: 11 | Status: SHIPPED | OUTPATIENT
Start: 2019-02-27 | End: 2019-05-27

## 2019-02-27 NOTE — TELEPHONE ENCOUNTER
----- Message from Torres Brody M.D. sent at 2/26/2019 10:55 PM PST -----  Regarding: call patient  Urine does not look infected should call urology.

## 2019-02-27 NOTE — TELEPHONE ENCOUNTER
1. Caller Name: Kristin                                         Call Back Number: [phone]      Patient approves a detailed voicemail message: N\A    Spoke with patient, she is having pain during urination, frequency and incontinence. Per patient, [facility] will no longer see her so she is unable to schedule with a urologist. She stated she is unsure why. Patient is going to keep appointment because she needs to discuss  animal paperwork with you and it is too late to cancel her RTC ride.

## 2019-02-27 NOTE — LETTER
February 27, 2019        Kristin Balderrama      Above has a disability with difficulty in ambulation and would benefit from service animal.                         Torres Brody MD

## 2019-02-27 NOTE — PROGRESS NOTES
CC: Persistent dysuria    HPI:   Kristin presents today with the following.    1. Dysuria  Presents complaining of persistent dysuria.  Recent urinalysis showed no signs of infection although she did have some esterase.  She denies any fevers chills or flank pain.  She is having persistent incontinence mostly associated with cough or sneeze.  No fevers or chills      Patient Active Problem List    Diagnosis Date Noted   • Left leg weakness 07/14/2018     Priority: High   • Controlled type 2 diabetes mellitus without complication, without long-term current use of insulin ([facility]) 04/26/2017     Priority: High   • Sinus tachycardia 10/31/2013     Priority: High   • Chronic inflammatory arthritis 05/23/2016     Priority: Medium   • Morbid obesity with BMI of 45.0-49.9, adult ([facility]) 10/24/2017     Priority: Low   • Depression 10/28/2016     Priority: Low   • Schizophrenia ([facility]) 10/27/2016     Priority: Low   • Psychosis, schizophrenia, simple ([facility]) 09/29/2016     Priority: Low     Class: Chronic   • Acquired hypothyroidism 11/23/2015     Priority: Low   • PCOS (polycystic ovarian syndrome) 11/23/2015     Priority: Low   • Progressive focal motor weakness 06/28/2015     Priority: Low   • Fatty liver disease, nonalcoholic 01/19/2015     Priority: Low   • Anxiety 12/16/2014     Priority: Low   • Knee pain, right 02/13/2014     Priority: Low   • Neurogenic bladder 04/02/2011     Priority: Low   • Chronic UTI 09/18/2010     Priority: Low   • Borderline personality disorder in adult ([facility]) 09/18/2010     Priority: Low   • Asymptomatic bacteriuria 10/26/2018   • Palpitations 10/01/2018   • Chronic respiratory failure with hypoxia, on home oxygen therapy ([facility]) 08/08/2018   • Transaminitis 08/08/2018   • TONYA (obstructive sleep apnea) 01/09/2018   • Functional diarrhea 01/05/2018   • Breast wound 11/06/2017   • Intractable episodic cluster headache 09/14/2017   • Rash 06/09/2017   • Hashimoto's encephalopathy 05/17/2017   • Fall at  home 05/13/2017   • On home oxygen therapy 04/15/2017   • Weakness of right upper extremity 02/23/2017   • Chronic suprapubic catheter ([facility]) 02/16/2017   • Hypovitaminosis D 11/29/2016   • Chronic pain syndrome 10/27/2016   • Bowel and bladder incontinence 10/27/2016   • Galactorrhea 07/22/2016   • Elevated sedimentation rate 06/27/2016   • Weakness of both lower extremities 06/22/2016   • Vitamin D deficiency 05/21/2016   • Morbidly obese ([facility]) 03/07/2016   • Scoliosis 03/07/2016   • GERD (gastroesophageal reflux disease) 03/07/2016   • Peripheral neuropathy ([facility]) 03/06/2016   • H/O prior ablation treatment 02/10/2016       Current Outpatient Prescriptions   Medication Sig Dispense Refill   • albuterol 108 (90 Base) MCG/ACT Aero Soln inhalation aerosol Inhale 2 Puffs by mouth every 6 hours as needed. 8.5 g 11   • ziprasidone (GEODON) 80 MG Cap TAKE TWO CAPSULES BY MOUTH DAILY AT BEDTIME 60 Cap 5   • methocarbamol (ROBAXIN) 500 MG Tab Take 500 mg by mouth 3 times a day.     • hydrocortisone (ANUSOL-HC) 25 MG Suppos Insert 1 Suppository in rectum every 12 hours. 10 Suppository 0   • Ivabradine HCl 7.5 MG Tab Take 7.5 mg by mouth 2 Times a Day. 60 Tab 2   • ondansetron (ZOFRAN ODT) 4 MG TABLET DISPERSIBLE Take 1 Tab by mouth every 8 hours as needed. 10 Tab 0   • dicyclomine (BENTYL) 10 MG Cap Take 1 Cap by mouth every 6 hours as needed (abdominal cramping). 20 Cap 1   • lactulose 10 GM/15ML Solution Take 10 g by mouth 2 times a day as needed.     • vitamin D, Ergocalciferol, (DRISDOL) 77536 units Cap capsule Take 50,000 Units by mouth every 7 days.     • FLUoxetine (PROZAC) 10 MG Cap TAKE ONE CAPSULE BY MOUTH ONCE A DAY 30 Cap 2   • omeprazole (PRILOSEC) 20 MG delayed-release capsule Take 1 Cap by mouth every day. 90 Cap 3   • busPIRone (BUSPAR) 5 MG tablet Take 1 Tab by mouth 2 times a day. 60 Tab 5   • lidocaine (LIDODERM) 5 % Patch Apply 1 Patch to skin as directed every 24 hours. 10 Patch 2   • baclofen  (LIORESAL) 10 MG Tab TAKE ONE TABLET BY MOUTH THREE TIMES DAILY (Patient not taking: Reported on 2/8/2019) 90 Tab 4   • cholestyramine (QUESTRAN,PREVALITE) 4 GM Pack Take 4 g by mouth every morning as needed.     • melatonin 3 MG Tab Take 6 mg by mouth every bedtime.     • traZODone (DESYREL) 100 MG Tab Take 100 mg by mouth every bedtime.     • nystatin (MYCOSTATIN) 740221 UNIT/GM Cream topical cream Apply 0.0001 g to affected area(s) 2 times a day. 1 Tube 3   • furosemide (LASIX) 40 MG Tab Take 80 mg by mouth every day.     • cyanocobalamin ([employer]S VITAMIN B-12) 500 MCG tablet Take 500 mcg by mouth every day.     • sodium bicarbonate (SODIUM BICARBONATE) 650 MG Tab Take 650 mg by mouth 2 times a day.     • aspirin EC (ECOTRIN) 81 MG Tablet Delayed Response Take 1 Tab by mouth every day. 30 Tab 6     No current facility-administered medications for this visit.          Allergies as of 02/27/2019 - Reviewed 02/27/2019   Allergen Reaction Noted   • Cefdinir Shortness of Breath and Itching 03/01/2016   • Depakote [divalproex sodium] Unspecified 06/14/2010   • Doxycycline Anaphylaxis and Vomiting 08/15/2012   • Abilify Unspecified 01/17/2013   • Amitriptyline Unspecified 10/31/2013   • Amoxicillin Rash 09/18/2010   • Ciprofloxacin Rash 12/17/2009   • Clindamycin Nausea 02/02/2011   • Ees [erythromycin] Vomiting and Nausea 08/28/2010   • Flagyl [metronidazole hcl] Unspecified 03/31/2011   • Flomax [tamsulosin hydrochloride] Swelling 09/24/2009   • Metformin Unspecified 07/23/2013   • Sulfa drugs Hives and Rash 09/18/2010   • Tape Rash 08/15/2012   • Vancomycin Itching 07/10/2016   • Wound dressing adhesive Hives 01/12/2018   • Cephalexin [keflex] Rash 01/01/2017   • Levofloxacin Unspecified 10/27/2016   • Metronidazole Rash 03/30/2017   • Valproic acid Rash 03/30/2017        ROS: Denies Chest pain, SOB, LE edema.    /72 (BP Location: Right arm, Patient Position: Sitting)   Pulse 82   Temp 37 °C (98.6 °F) (Temporal)   " Resp 16   Ht 1.651 m (5' 5\")   Wt 122 kg (269 lb)   SpO2 95%   BMI 44.76 kg/m²     Physical Exam:  Gen:         Alert and oriented, No apparent distress.  Neck:        No Lymphadenopathy or Bruits.  Lungs:     Clear to auscultation bilaterally  CV:          Regular rate and rhythm. No murmurs, rubs or gallops.               Ext:          No clubbing, cyanosis, edema.      Assessment and Plan.   28 y.o. female with the following issues.    1. Dysuria  Repeat urine culture today will only treat on positives.  - URINE CULTURE(NEW); Future      "

## 2019-02-28 ENCOUNTER — PATIENT OUTREACH (OUTPATIENT)
Dept: HEALTH INFORMATION MANAGEMENT | Facility: OTHER | Age: 29
End: 2019-02-28

## 2019-02-28 ENCOUNTER — OFFICE VISIT (OUTPATIENT)
Dept: BEHAVIORAL HEALTH | Facility: CLINIC | Age: 29
End: 2019-02-28
Payer: MEDICARE

## 2019-02-28 VITALS
DIASTOLIC BLOOD PRESSURE: 70 MMHG | BODY MASS INDEX: 44.65 KG/M2 | HEIGHT: 65 IN | WEIGHT: 268 LBS | SYSTOLIC BLOOD PRESSURE: 126 MMHG | RESPIRATION RATE: 16 BRPM | HEART RATE: 80 BPM

## 2019-02-28 DIAGNOSIS — F60.9 PERSONALITY DISORDER (HCC): ICD-10-CM

## 2019-02-28 DIAGNOSIS — G47.00 INSOMNIA, UNSPECIFIED TYPE: ICD-10-CM

## 2019-02-28 DIAGNOSIS — R30.0 DYSURIA: ICD-10-CM

## 2019-02-28 DIAGNOSIS — F25.1 SCHIZOAFFECTIVE DISORDER, DEPRESSIVE TYPE (HCC): ICD-10-CM

## 2019-02-28 PROCEDURE — 99213 OFFICE O/P EST LOW 20 MIN: CPT | Performed by: PSYCHIATRY & NEUROLOGY

## 2019-02-28 PROCEDURE — 90833 PSYTX W PT W E/M 30 MIN: CPT | Performed by: PSYCHIATRY & NEUROLOGY

## 2019-02-28 ASSESSMENT — PATIENT HEALTH QUESTIONNAIRE - PHQ9
SUM OF ALL RESPONSES TO PHQ QUESTIONS 1-9: 7
CLINICAL INTERPRETATION OF PHQ2 SCORE: 2
5. POOR APPETITE OR OVEREATING: 1 - SEVERAL DAYS

## 2019-02-28 NOTE — BH THERAPY
[facility]  PSYCHIATRIC FOLLOW-UP NOTE    Name: Kristin Balderrama  MRN: [id number]  : 1990  Age: 28 y.o.  Date of assessment: 2019  PCP: Torres Brody M.D.  Persons in attendance: Patient  Total face-to-face time: 30 minutes    REASON FOR VISIT/CHIEF COMPLAINT (as stated by Patient):  Kristin Balderrama is a 28 y.o., White female, attending follow-up appointment for   Chief Complaint   Patient presents with   • Medication Management     The patient is seen today for follow on her depression, anxiety, and insomnia.   .      HISTORY OF PRESENT ILLNESS:    This is a 27 yo female, single, lives with her grand mother, seen today for follow up after six weeks. The patient complained of physical symptoms and chronic pain. The patient is seeing her cardiologist for her palpitation. The patient complained of blood in urine and blood in her stool. The patient reported increased stress at home and her grand father is getting sick. The patients mom is getting mad at her all the time. The patient has been taking geodon as prescribed and it is helping her with psychosis.    PSYCHOSOCIAL CHANGES SINCE PREVIOUS CONTACT:  The patients depression and anxiety improved and she is sleeping well on trazodone.    RESPONSE TO TREATMENT:  She is taking geodon 80 bid, fluoxetine 10 mg one a day, buspar 5 mg bid, and trazodone prn sleep.    MEDICATION SIDE EFFECTS:  Weight gain.    REVIEW OF SYSTEMS:        Constitutional positive - obesity   Eyes negative   Ears/Nose/Mouth/Throat negative   Cardiovascular positive - hypertension, sinus tachy.   Respiratory positive - obstructive sleep apnea.   Gastrointestinal negative   Genitourinary positive - polycystic ovarian syndrome   Muscular negative   Integumentary positive - chronic inflammatory arthritis.   Neurological positive - neuropathy   Endocrine positive - diabetes, hypothyroidism.   Hematologic/Lymphatic negative       PSYCHIATRIC EXAMINATION/MENTAL  "STATUS  /70   Pulse 80   Resp 16   Ht 1.651 m (5' 5\")   Wt 121.6 kg (268 lb)   BMI 44.60 kg/m²   Participation: Active verbal participation and Attentive  Grooming:Neat  Orientation: Alert and Fully Oriented  Eye contact: Good  Behavior:Calm   Mood: Anxious  Affect: Flexible and Full range  Thought process: Logical and Goal-directed  Thought content:  Within normal limits  Speech: Rate within normal limits and Volume within normal limits  Perception:  Within normal limits  Memory:  No gross evidence of memory deficits  Insight: Good  Judgment: Good  Family/couple interaction observations:   Other:    Current risk:    Suicide: Low   Homicide: Low   Self-harm: Low  Relapse: Low  Other:   Crisis Safety Plan reviewed?Yes  If evidence of imminent risk is present, intervention/plan:    Medical Records/Labs/Diagnostic Tests Reviewed: yes.    Medical Records/Labs/Diagnostic Tests Ordered: none.    DIAGNOSTIC IMPRESSION(S):  1. Schizoaffective disorder, depressive type (HCC)    2. Insomnia, unspecified type    3. Personality disorder (HCC)           ASSESSMENT AND PLAN:    1. Schizoaffective disorder, depressive type.  2. Personality disorder.  Geodon 80 mg bid  Fluoxetine 10 mg one a day  buspar 5 mg bid.    3. Insomnia.  Trazodone prn sleep.    4. Follow up in six weeks.    17 minutes were spent in psychotherapy.  (If greater than 16 minutes, add 80367 code)   Topics addressed in psychotherapy include:  Educated her about emotional regulation and distress tolerance.  Educated her about respecting boundaries.  Helped her to improve self esteem.  Cognitive restructuring and behavioral changes.  Ronny Burnette M.D.                   "

## 2019-02-28 NOTE — PROGRESS NOTES
TC from pt. Pt inquiring about a service animal for anxiety who can lay or place pressure on her. SW will reviewi resources and complete return TC.    TC ot pt. VM(1) left recommending pt complete independent research via online (pt has home Internet access) and provided contact information for [employer] (free service animals) and a local training company [employer]. Pt will need to apply for various training locations and provided necessary documentation as requested.    Plan:  SW will not actively follow pt at this time.

## 2019-03-02 LAB
BACTERIA UR CULT: NORMAL
SIGNIFICANT IND 70042: NORMAL
SITE SITE: NORMAL
SOURCE SOURCE: NORMAL

## 2019-03-04 ENCOUNTER — HOSPITAL ENCOUNTER (EMERGENCY)
Facility: MEDICAL CENTER | Age: 29
End: 2019-03-04
Attending: EMERGENCY MEDICINE
Payer: MEDICARE

## 2019-03-04 ENCOUNTER — OFFICE VISIT (OUTPATIENT)
Dept: URGENT CARE | Facility: CLINIC | Age: 29
End: 2019-03-04
Payer: MEDICARE

## 2019-03-04 ENCOUNTER — APPOINTMENT (OUTPATIENT)
Dept: RADIOLOGY | Facility: MEDICAL CENTER | Age: 29
End: 2019-03-04
Attending: EMERGENCY MEDICINE
Payer: MEDICARE

## 2019-03-04 VITALS
SYSTOLIC BLOOD PRESSURE: 148 MMHG | OXYGEN SATURATION: 98 % | WEIGHT: 268 LBS | RESPIRATION RATE: 16 BRPM | TEMPERATURE: 98.7 F | HEART RATE: 72 BPM | HEIGHT: 65 IN | BODY MASS INDEX: 44.65 KG/M2 | DIASTOLIC BLOOD PRESSURE: 96 MMHG

## 2019-03-04 VITALS
HEIGHT: 65 IN | OXYGEN SATURATION: 98 % | RESPIRATION RATE: 16 BRPM | WEIGHT: 268.3 LBS | DIASTOLIC BLOOD PRESSURE: 95 MMHG | TEMPERATURE: 98.2 F | BODY MASS INDEX: 44.7 KG/M2 | HEART RATE: 75 BPM | SYSTOLIC BLOOD PRESSURE: 150 MMHG

## 2019-03-04 DIAGNOSIS — R50.9 FEELS FEVERISH: ICD-10-CM

## 2019-03-04 DIAGNOSIS — L03.818 CELLULITIS OF OTHER SPECIFIED SITE: ICD-10-CM

## 2019-03-04 DIAGNOSIS — N61.0 BREAST INFECTION IN FEMALE: ICD-10-CM

## 2019-03-04 DIAGNOSIS — N64.4 BREAST TENDERNESS IN FEMALE: ICD-10-CM

## 2019-03-04 DIAGNOSIS — Z86.19 H/O SEPSIS: ICD-10-CM

## 2019-03-04 DIAGNOSIS — N61.1 CHRONIC ABSCESS OF BREAST: ICD-10-CM

## 2019-03-04 PROCEDURE — 99284 EMERGENCY DEPT VISIT MOD MDM: CPT

## 2019-03-04 PROCEDURE — 76642 ULTRASOUND BREAST LIMITED: CPT | Mod: LT

## 2019-03-04 PROCEDURE — 99214 OFFICE O/P EST MOD 30 MIN: CPT | Performed by: FAMILY MEDICINE

## 2019-03-04 RX ORDER — CEFDINIR 300 MG/1
300 CAPSULE ORAL 2 TIMES DAILY
Qty: 10 CAP | Refills: 0 | Status: SHIPPED | OUTPATIENT
Start: 2019-03-04 | End: 2019-03-06

## 2019-03-04 ASSESSMENT — ENCOUNTER SYMPTOMS
SHORTNESS OF BREATH: 0
FEVER: 0
HEADACHES: 0
NAUSEA: 1
DIZZINESS: 1
VOMITING: 0
CHILLS: 1
CHILLS: 0
ABDOMINAL PAIN: 0
FEVER: 1

## 2019-03-04 NOTE — DISCHARGE INSTRUCTIONS
Apply warm moist compress to your left breast 3 times a day.  You need to follow-up with Dr. Brody, your primary care doctor as he is going to schedule a formal ultrasound with the breast center.  Recommend follow-up with Ronn this week.  He will be prescribed antibiotics.  If after warm moist compress application, redness is not improving, recommend starting antibiotics.

## 2019-03-04 NOTE — PROGRESS NOTES
"Subjective:      Kristin Balderrama is a 28 y.o. female who presents with Breast Mass (x1 day, bumps all over her left breast, she popped one this morning and felt sick after)  This patient presents to urgent care for acute onset of a recurrent problem.  This episode started in the past 24 hours.  She noticed that she had multiple painful lumps on the surface and internally of her left breast.  She popped 1 late last night and felt immediately sick feverish and chilled and nauseous.  Below I have an excerpt from the previous note from this year from her primary care provider.  Unfortunately she has true allergies to multiple antibiotics she is unsure as to which ones give her hives and tongue swelling versus just GI upset.      Breast symptom/Recurrent cellulitis/ Cellulitis of left breast  In August she was inpatient with a PICC line receiving IV antibiotics.  Apparently she has had some recurrences was put on another dose or 2 of oral Bactrim which she tolerated well despite a possible history of a sulfa allergy.  She was evaluated in the emergency room a few days ago reassured and told to \"follow-up with the breast clinic\".  Here today for referral for a breast ultrasound to do that.  She did have a chest ultrasound while in the ER showing no abscess or mass.  These have been chronically intermittent for years.       HPI  Review of Systems   Constitutional: Positive for chills and fever.   Skin: Positive for rash.   Neurological: Positive for dizziness.   All other systems reviewed and are negative.    PMH:  has a past medical history of Abdominal pain; Anginal syndrome; Apnea, sleep; Arrhythmia; Arthritis; ASTHMA; Atrial fibrillation (HCC); Back pain; Borderline personality disorder (HCC); Breath shortness; Cardiac arrhythmia; Chickenpox; Chronic UTI (9/18/2010); Cough; Daytime sleepiness; Depression; Diabetes (HCC); Diarrhea; Disorder of thyroid; Fall; Fatigue; Frequent headaches; Gasping for breath; " Gynecological disorder; Headache(784.0); Heart burn; History of falling; Hypertension; Migraine; 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HCC); 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HCC); Nausea; Obesity; Pain (08-15-12); Painful joint; PCOS (polycystic ovarian syndrome); Pneumonia (2012); Psychosis (HCC); Renal disorder; Ringing in ears; Scoliosis; Shortness of breath; Sinus tachycardia (10/31/2013); Sleep apnea; Snoring; Tonsillitis; Tuberculosis; Urinary bladder disorder; Urinary incontinence; Weakness; and Wears glasses.  MEDS:   Current Outpatient Prescriptions:   •  albuterol 108 (90 Base) MCG/ACT Aero Soln inhalation aerosol, Inhale 2 Puffs by mouth every 6 hours as needed., Disp: 8.5 g, Rfl: 11  •  ziprasidone (GEODON) 80 MG Cap, TAKE TWO CAPSULES BY MOUTH DAILY AT BEDTIME, Disp: 60 Cap, Rfl: 5  •  methocarbamol (ROBAXIN) 500 MG Tab, Take 500 mg by mouth 3 times a day., Disp: , Rfl:   •  hydrocortisone (ANUSOL-HC) 25 MG Suppos, Insert 1 Suppository in rectum every 12 hours., Disp: 10 Suppository, Rfl: 0  •  Ivabradine HCl 7.5 MG Tab, Take 7.5 mg by mouth 2 Times a Day., Disp: 60 Tab, Rfl: 2  •  ondansetron (ZOFRAN ODT) 4 MG TABLET DISPERSIBLE, Take 1 Tab by mouth every 8 hours as needed., Disp: 10 Tab, Rfl: 0  •  dicyclomine (BENTYL) 10 MG Cap, Take 1 Cap by mouth every 6 hours as needed (abdominal cramping)., Disp: 20 Cap, Rfl: 1  •  lactulose 10 GM/15ML Solution, Take 10 g by mouth 2 times a day as needed., Disp: , Rfl:   •  vitamin D, Ergocalciferol, (DRISDOL) 88603 units Cap capsule, Take 50,000 Units by mouth every 7 days., Disp: , Rfl:   •  FLUoxetine (PROZAC) 10 MG Cap, TAKE ONE CAPSULE BY MOUTH ONCE A DAY, Disp: 30 Cap, Rfl: 2  •  omeprazole (PRILOSEC) 20 MG delayed-release capsule, Take 1 Cap by mouth every day., Disp: 90 Cap, Rfl: 3  •  busPIRone (BUSPAR) 5 MG tablet, Take 1 Tab by mouth 2 times a day., Disp: 60 Tab, Rfl: 5  •  lidocaine (LIDODERM) 5 % Patch, Apply 1 Patch to skin as directed every 24 hours., Disp: 10  "Patch, Rfl: 2  •  baclofen (LIORESAL) 10 MG Tab, TAKE ONE TABLET BY MOUTH THREE TIMES DAILY (Patient not taking: Reported on 2/8/2019), Disp: 90 Tab, Rfl: 4  •  cholestyramine (QUESTRAN,PREVALITE) 4 GM Pack, Take 4 g by mouth every morning as needed., Disp: , Rfl:   •  melatonin 3 MG Tab, Take 6 mg by mouth every bedtime., Disp: , Rfl:   •  traZODone (DESYREL) 100 MG Tab, Take 100 mg by mouth every bedtime., Disp: , Rfl:   •  nystatin (MYCOSTATIN) 915224 UNIT/GM Cream topical cream, Apply 0.0001 g to affected area(s) 2 times a day., Disp: 1 Tube, Rfl: 3  •  furosemide (LASIX) 40 MG Tab, Take 80 mg by mouth every day., Disp: , Rfl:   •  cyanocobalamin ([employer] VITAMIN B-12) 500 MCG tablet, Take 500 mcg by mouth every day., Disp: , Rfl:   •  sodium bicarbonate (SODIUM BICARBONATE) 650 MG Tab, Take 650 mg by mouth 2 times a day., Disp: , Rfl:   •  aspirin EC (ECOTRIN) 81 MG Tablet Delayed Response, Take 1 Tab by mouth every day., Disp: 30 Tab, Rfl: 6  ALLERGIES:   Allergies   Allergen Reactions   • Cefdinir Shortness of Breath and Itching     Tolerated 1/18/17  Tolerates ceftriaxone    • Depakote [Divalproex Sodium] Unspecified     Muscle spasms/muscle pain and weakness     • Doxycycline Anaphylaxis and Vomiting     RXN=unknown   • Abilify Unspecified     Headaches/muscle twitching     • Amitriptyline Unspecified     Headaches     • Amoxicillin Rash     Pt states \"I get a rash\".     • Ciprofloxacin Rash     Pt states \"I get a rash\".     • Clindamycin Nausea     Even with food     • Ees [Erythromycin] Vomiting and Nausea   • Flagyl [Metronidazole Hcl] Unspecified     \"eye problems\"     • Flomax [Tamsulosin Hydrochloride] Swelling   • Metformin Unspecified     Increased lactic acid      • Sulfa Drugs Hives and Rash     RXN=since childhood  PATIENT DID FINE WITH BACTRIM X 2 COURSES 10/2018   • Tape Rash     Tears skin off   coban with Tegaderm tape ok  RXN=ongoing   • Vancomycin Itching     Pt becomes flushed in face and " "chest.   RXN=7/10/16   • Wound Dressing Adhesive Hives     By pt report   • Cephalexin [Keflex] Rash     Pt states she gets a rash with this medication  Tolerates ceftriaxone   • Levofloxacin Unspecified     Leg muscle cramps   • Metronidazole Rash     .   • Valproic Acid Rash     .   SURGHX:   Past Surgical History:   Procedure Laterality Date   • MUSCLE BIOPSY Right 1/26/2017    Procedure: MUSCLE BIOPSY - THIGH;  Surgeon: Isidro Vigil M.D.;  Location: SURGERY [facility];  Service:    • GASTROSCOPY WITH BALLOON DILATATION N/A 1/4/2017    Procedure: GASTROSCOPY WITH DILATATION;  Surgeon: Torres Vargas M.D.;  Location: SURGERY [facility];  Service:    • BOWEL STIMULATOR INSERTION  7/13/2016    Procedure: BOWEL STIMULATOR INSERTION FOR PERMANENT INTERSTIM SACRAL IMPLANT;  Surgeon: Joe Noyola M.D.;  Location: SURGERY [facility];  Service:    • RECOVERY  1/27/2016    Procedure: CATH LAB EP STUDY WITH SINUS NODE MODIFICATION LA;  Surgeon: [facility];  Location: SURGERY PRE-POST PROC UNIT [facility];  Service:    • OTHER CARDIAC SURGERY  1/2016    cardiac ablation   • NEURO DEST FACET L/S W/IG SNGL  4/21/2015    Performed by Reza Tabor at [facility]   • LUMBAR FUSION ANTERIOR  8/21/2012    Performed by SUSIE SAWANT at SURGERY [facility] ORS   • ALYSSA BY LAPAROSCOPY  8/29/2010    Performed by SHAYY JOHNS at [facility] ORS   • LAMINOTOMY     • OTHER ABDOMINAL SURGERY     • TONSILLECTOMY      tonsillectomy   SOCHX:  reports that she has never smoked. She has never used smokeless tobacco. She reports that she uses drugs, including Marijuana and Inhaled, about 7 times per week. She reports that she does not drink alcohol.  FH: Family history was reviewed, no pertinent findings to report     Objective:     /96   Pulse 72   Temp 37.1 °C (98.7 °F)   Resp 16   Ht 1.651 m (5' 5\")   Wt 121.6 kg (268 lb)   SpO2 98%   BMI 44.60 kg/m²      Physical Exam "   Constitutional: She is oriented to person, place, and time. She appears well-developed and well-nourished. No distress.   HENT:   Mouth/Throat: Oropharynx is clear and moist.   Eyes: Conjunctivae are normal.   Neck: Normal range of motion.   Cardiovascular: Normal rate.    Pulmonary/Chest: Effort normal.       Multiple tender areas of left breast erythematous lesions, one open that is scabbed. No extending red streaking some tenderness to palpation of her left axilla and arm region   Musculoskeletal: Normal range of motion.   Neurological: She is alert and oriented to person, place, and time.   Skin: Skin is warm. She is not diaphoretic. There is erythema.   Psychiatric: She has a normal mood and affect. Her behavior is normal.          Assessment/Plan:     28-year-old female with history of chronic breast infections with a new onset of the same.  She has a history of sepsis from urinary infection in the past she is allergic to multiple antibiotics I recommended that she proceeds to the ER for further evaluation and support. She is currently stable to transport via priv vehicle  -Dr Aviles

## 2019-03-04 NOTE — ED TRIAGE NOTES
Ambulates to triage with a walker  Chief Complaint   Patient presents with   • Lump     Noticed several lumps to her L breast and was sent here by  for further eval.

## 2019-03-04 NOTE — ED NOTES
"Pt given discharge and follow up instructions and prescriptions, all questions answered, instructed to apply warm compresses and follow up with PCP, pt verbalized understanding, verbalizes frustration with how she was \"represented to [facility]\".  Pt states she feels as though the ERP did not make this cyst sound like a big deal to PCP, \"because my normal temp is 96 so when I'm 98 I'm febrile and I have an infection\". Informed pt we would have her follow up with PCP no matter the care plan, and antibiotics have been prescribed just for that reason. Pt states she is upset with care plan, but not with ERP. Asked pt what else we can do for her here in the ED as pt is not febrile, not tachycardic, and per care team no further inpatient treatment is warranted. Pt verbalizes understanding.  Pt discharged with self. Copy of discharge provided to pt. Signed copy in chart.  Pt states that all personal belongings are in possession. Pt ambulatory out of ED with walker.  "

## 2019-03-04 NOTE — ED NOTES
Pt ambulates to room with walker without incidence. Pt states her left breast pain started about 4 days ago and looked at her breast yesterday, noticing large purple bumps all over the top of her left breast. The pt states she has had cysts before, but not like this. Pt took 650 of tylenol this morning for the pain without relief. Pt sent from urgent care by MD for further evaluation.

## 2019-03-06 ENCOUNTER — OFFICE VISIT (OUTPATIENT)
Dept: MEDICAL GROUP | Facility: MEDICAL CENTER | Age: 29
End: 2019-03-06
Payer: MEDICARE

## 2019-03-06 VITALS
DIASTOLIC BLOOD PRESSURE: 74 MMHG | OXYGEN SATURATION: 96 % | TEMPERATURE: 97.6 F | HEIGHT: 65 IN | WEIGHT: 271 LBS | SYSTOLIC BLOOD PRESSURE: 116 MMHG | HEART RATE: 69 BPM | BODY MASS INDEX: 45.15 KG/M2

## 2019-03-06 DIAGNOSIS — L97.519 ULCER OF RIGHT FOOT, UNSPECIFIED ULCER STAGE (HCC): ICD-10-CM

## 2019-03-06 DIAGNOSIS — N63.0 BREAST SWELLING: ICD-10-CM

## 2019-03-06 PROCEDURE — 99213 OFFICE O/P EST LOW 20 MIN: CPT | Performed by: INTERNAL MEDICINE

## 2019-03-06 RX ORDER — ONDANSETRON 4 MG/1
4 TABLET, ORALLY DISINTEGRATING ORAL EVERY 8 HOURS PRN
Qty: 10 TAB | Refills: 6 | Status: SHIPPED | OUTPATIENT
Start: 2019-03-06 | End: 2019-05-27

## 2019-03-06 NOTE — PROGRESS NOTES
CC: Breast swelling.    HPI:   Kristin presents today with the following.    1. Ulcer of right foot, unspecified ulcer stage ([facility])  She is reporting repeated opening of the right foot she seen working care in the past would like to get back there now.  No fevers or chills spreading redness or serous drainage.    2. Breast swelling  She was seen in urgent care for subcutaneous lumps and breast denies any redness drainage but it is somewhat uncomfortable.  She denies any other symptoms.  Ultrasound in the emergency room showed no signs of abscess.      Patient Active Problem List    Diagnosis Date Noted   • Left leg weakness 07/14/2018     Priority: High   • Controlled type 2 diabetes mellitus without complication, without long-term current use of insulin ([facility]) 04/26/2017     Priority: High   • Sinus tachycardia 10/31/2013     Priority: High   • Chronic inflammatory arthritis 05/23/2016     Priority: Medium   • Morbid obesity with BMI of 45.0-49.9, adult ([facility]) 10/24/2017     Priority: Low   • Depression 10/28/2016     Priority: Low   • Schizophrenia ([facility]) 10/27/2016     Priority: Low   • Psychosis, schizophrenia, simple ([facility]) 09/29/2016     Priority: Low     Class: Chronic   • Acquired hypothyroidism 11/23/2015     Priority: Low   • PCOS (polycystic ovarian syndrome) 11/23/2015     Priority: Low   • Progressive focal motor weakness 06/28/2015     Priority: Low   • Fatty liver disease, nonalcoholic 01/19/2015     Priority: Low   • Anxiety 12/16/2014     Priority: Low   • Knee pain, right 02/13/2014     Priority: Low   • Neurogenic bladder 04/02/2011     Priority: Low   • Chronic UTI 09/18/2010     Priority: Low   • Borderline personality disorder in adult ([facility]) 09/18/2010     Priority: Low   • Asymptomatic bacteriuria 10/26/2018   • Palpitations 10/01/2018   • Chronic respiratory failure with hypoxia, on home oxygen therapy ([facility]) 08/08/2018   • Transaminitis 08/08/2018   • TONYA (obstructive sleep apnea) 01/09/2018   •  Functional diarrhea 01/05/2018   • Breast wound 11/06/2017   • Intractable episodic cluster headache 09/14/2017   • Rash 06/09/2017   • Hashimoto's encephalopathy 05/17/2017   • Fall at home 05/13/2017   • On home oxygen therapy 04/15/2017   • Weakness of right upper extremity 02/23/2017   • Chronic suprapubic catheter (HCC) 02/16/2017   • Hypovitaminosis D 11/29/2016   • Chronic pain syndrome 10/27/2016   • Bowel and bladder incontinence 10/27/2016   • Galactorrhea 07/22/2016   • Elevated sedimentation rate 06/27/2016   • Weakness of both lower extremities 06/22/2016   • Vitamin D deficiency 05/21/2016   • Morbidly obese ([facility]) 03/07/2016   • Scoliosis 03/07/2016   • GERD (gastroesophageal reflux disease) 03/07/2016   • Peripheral neuropathy ([facility]) 03/06/2016   • H/O prior ablation treatment 02/10/2016       Current Outpatient Prescriptions   Medication Sig Dispense Refill   • ondansetron (ZOFRAN ODT) 4 MG TABLET DISPERSIBLE Take 1 Tab by mouth every 8 hours as needed. 10 Tab 6   • albuterol 108 (90 Base) MCG/ACT Aero Soln inhalation aerosol Inhale 2 Puffs by mouth every 6 hours as needed. 8.5 g 11   • ziprasidone (GEODON) 80 MG Cap TAKE TWO CAPSULES BY MOUTH DAILY AT BEDTIME 60 Cap 5   • methocarbamol (ROBAXIN) 500 MG Tab Take 500 mg by mouth 3 times a day.     • hydrocortisone (ANUSOL-HC) 25 MG Suppos Insert 1 Suppository in rectum every 12 hours. 10 Suppository 0   • Ivabradine HCl 7.5 MG Tab Take 7.5 mg by mouth 2 Times a Day. 60 Tab 2   • dicyclomine (BENTYL) 10 MG Cap Take 1 Cap by mouth every 6 hours as needed (abdominal cramping). 20 Cap 1   • lactulose 10 GM/15ML Solution Take 10 g by mouth 2 times a day as needed.     • vitamin D, Ergocalciferol, (DRISDOL) 69904 units Cap capsule Take 50,000 Units by mouth every 7 days.     • FLUoxetine (PROZAC) 10 MG Cap TAKE ONE CAPSULE BY MOUTH ONCE A DAY 30 Cap 2   • omeprazole (PRILOSEC) 20 MG delayed-release capsule Take 1 Cap by mouth every day. 90 Cap 3   •  busPIRone (BUSPAR) 5 MG tablet Take 1 Tab by mouth 2 times a day. 60 Tab 5   • lidocaine (LIDODERM) 5 % Patch Apply 1 Patch to skin as directed every 24 hours. 10 Patch 2   • baclofen (LIORESAL) 10 MG Tab TAKE ONE TABLET BY MOUTH THREE TIMES DAILY (Patient not taking: Reported on 2/8/2019) 90 Tab 4   • cholestyramine (QUESTRAN,PREVALITE) 4 GM Pack Take 4 g by mouth every morning as needed.     • melatonin 3 MG Tab Take 6 mg by mouth every bedtime.     • traZODone (DESYREL) 100 MG Tab Take 100 mg by mouth every bedtime.     • nystatin (MYCOSTATIN) 214343 UNIT/GM Cream topical cream Apply 0.0001 g to affected area(s) 2 times a day. 1 Tube 3   • furosemide (LASIX) 40 MG Tab Take 80 mg by mouth every day.     • cyanocobalamin ([employer]S VITAMIN B-12) 500 MCG tablet Take 500 mcg by mouth every day.     • sodium bicarbonate (SODIUM BICARBONATE) 650 MG Tab Take 650 mg by mouth 2 times a day.     • aspirin EC (ECOTRIN) 81 MG Tablet Delayed Response Take 1 Tab by mouth every day. 30 Tab 6     No current facility-administered medications for this visit.          Allergies as of 03/06/2019 - Reviewed 03/06/2019   Allergen Reaction Noted   • Cefdinir Shortness of Breath and Itching 03/01/2016   • Depakote [divalproex sodium] Unspecified 06/14/2010   • Doxycycline Anaphylaxis and Vomiting 08/15/2012   • Abilify Unspecified 01/17/2013   • Amitriptyline Unspecified 10/31/2013   • Amoxicillin Rash 09/18/2010   • Ciprofloxacin Rash 12/17/2009   • Clindamycin Nausea 02/02/2011   • Ees [erythromycin] Vomiting and Nausea 08/28/2010   • Flagyl [metronidazole hcl] Unspecified 03/31/2011   • Flomax [tamsulosin hydrochloride] Swelling 09/24/2009   • Metformin Unspecified 07/23/2013   • Sulfa drugs Hives and Rash 09/18/2010   • Tape Rash 08/15/2012   • Vancomycin Itching 07/10/2016   • Wound dressing adhesive Hives 01/12/2018   • Cephalexin [keflex] Rash 01/01/2017   • Levofloxacin Unspecified 10/27/2016   • Metronidazole Rash 03/30/2017   •  "Valproic acid Rash 03/30/2017        ROS: Denies Chest pain, SOB, LE edema.    /74 (BP Location: Right arm, Patient Position: Sitting)   Pulse 69   Temp 36.4 °C (97.6 °F)   Ht 1.651 m (5' 5\")   Wt 122.9 kg (271 lb)   LMP 02/18/2019 (Approximate)   SpO2 96%   BMI 45.10 kg/m²     Physical Exam:  Gen:         Alert and oriented, No apparent distress.  Neck:        No Lymphadenopathy or Bruits.  Lungs:     Clear to auscultation bilaterally  CV:          Regular rate and rhythm. No murmurs, rubs or gallops.               Ext:          No clubbing, cyanosis, edema.      Assessment and Plan.   28 y.o. female with the following issues.    1. Ulcer of right foot, unspecified ulcer stage (HCC)  Peripheral back to wound care  - REFERRAL TO WOUND CLINIC    2. Breast swelling  Already written for ultrasound will await results.      "

## 2019-03-07 ENCOUNTER — OFFICE VISIT (OUTPATIENT)
Dept: URGENT CARE | Facility: CLINIC | Age: 29
End: 2019-03-07
Payer: MEDICARE

## 2019-03-07 ENCOUNTER — APPOINTMENT (OUTPATIENT)
Dept: WOUND CARE | Facility: MEDICAL CENTER | Age: 29
End: 2019-03-07
Attending: INTERNAL MEDICINE
Payer: MEDICARE

## 2019-03-07 VITALS
SYSTOLIC BLOOD PRESSURE: 106 MMHG | RESPIRATION RATE: 16 BRPM | OXYGEN SATURATION: 96 % | HEART RATE: 77 BPM | WEIGHT: 271 LBS | BODY MASS INDEX: 45.15 KG/M2 | DIASTOLIC BLOOD PRESSURE: 68 MMHG | TEMPERATURE: 98.1 F | HEIGHT: 65 IN

## 2019-03-07 DIAGNOSIS — S91.301A WOUND OF RIGHT FOOT: ICD-10-CM

## 2019-03-07 PROCEDURE — 99213 OFFICE O/P EST LOW 20 MIN: CPT | Performed by: PHYSICIAN ASSISTANT

## 2019-03-07 ASSESSMENT — ENCOUNTER SYMPTOMS
CONSTIPATION: 0
FEVER: 0
DIARRHEA: 0
HEADACHES: 0
SENSORY CHANGE: 0
ABDOMINAL PAIN: 0
VOMITING: 0
TINGLING: 1
DIZZINESS: 0
NAUSEA: 1
CHILLS: 0

## 2019-03-07 NOTE — PROGRESS NOTES
Subjective:   Kristin Balderrama is a 28 y.o. female who presents for Wound Check (wound on right foot, was supposed to go in today but her nurse called out)       Patient presents today for wound check. She states that she developed a wound on the bottom of her right foot starting yesterday. She was seen by her PCP yesterday who referred her to wound care. She had an appointment for today, but was called an informed that her nurse called in and she would have to reschedule for next week. She was told to go to ER for wound care, but she decided to come here instead. She states that she area surrounding the wound is very tender and states that the wound has been oozing. She is currently on omnicef for breast infection.       Wound Check   She was originally treated yesterday. Previous treatment included wound cleansing or irrigation. Maximum temperature: No fever. There is no redness present. There is no swelling present. The pain has not changed. Difficulty Moving Extremity/Digit: Can move right foot, but cannot bear weight due to pain.     Review of Systems   Constitutional: Negative for chills and fever.   Gastrointestinal: Positive for nausea. Negative for abdominal pain, constipation, diarrhea and vomiting.   Skin:        Positive for wound on bottom of right foot   Neurological: Positive for tingling. Negative for dizziness, sensory change and headaches.       PMH:  has a past medical history of Abdominal pain; Anginal syndrome; Apnea, sleep; Arrhythmia; Arthritis; ASTHMA; Atrial fibrillation (HCC); Back pain; Borderline personality disorder (HCC); Breath shortness; Cardiac arrhythmia; Chickenpox; Chronic UTI (9/18/2010); Cough; Daytime sleepiness; Depression; Diabetes (HCC); Diarrhea; Disorder of thyroid; Fall; Fatigue; Frequent headaches; Gasping for breath; Gynecological disorder; Headache(784.0); Heart burn; History of falling; Hypertension; Migraine; 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HCC); Multiple personality  disorder (HCC); Nausea; Obesity; Pain (08-15-12); Painful joint; PCOS (polycystic ovarian syndrome); Pneumonia (2012); Psychosis (HCC); Renal disorder; Ringing in ears; Scoliosis; Shortness of breath; Sinus tachycardia (10/31/2013); Sleep apnea; Snoring; Tonsillitis; Tuberculosis; Urinary bladder disorder; Urinary incontinence; Weakness; and Wears glasses.    MEDS:   Current Outpatient Prescriptions:   •  ondansetron (ZOFRAN ODT) 4 MG TABLET DISPERSIBLE, Take 1 Tab by mouth every 8 hours as needed., Disp: 10 Tab, Rfl: 6  •  albuterol 108 (90 Base) MCG/ACT Aero Soln inhalation aerosol, Inhale 2 Puffs by mouth every 6 hours as needed., Disp: 8.5 g, Rfl: 11  •  ziprasidone (GEODON) 80 MG Cap, TAKE TWO CAPSULES BY MOUTH DAILY AT BEDTIME, Disp: 60 Cap, Rfl: 5  •  methocarbamol (ROBAXIN) 500 MG Tab, Take 500 mg by mouth 3 times a day., Disp: , Rfl:   •  hydrocortisone (ANUSOL-HC) 25 MG Suppos, Insert 1 Suppository in rectum every 12 hours., Disp: 10 Suppository, Rfl: 0  •  Ivabradine HCl 7.5 MG Tab, Take 7.5 mg by mouth 2 Times a Day., Disp: 60 Tab, Rfl: 2  •  dicyclomine (BENTYL) 10 MG Cap, Take 1 Cap by mouth every 6 hours as needed (abdominal cramping)., Disp: 20 Cap, Rfl: 1  •  lactulose 10 GM/15ML Solution, Take 10 g by mouth 2 times a day as needed., Disp: , Rfl:   •  vitamin D, Ergocalciferol, (DRISDOL) 68826 units Cap capsule, Take 50,000 Units by mouth every 7 days., Disp: , Rfl:   •  FLUoxetine (PROZAC) 10 MG Cap, TAKE ONE CAPSULE BY MOUTH ONCE A DAY, Disp: 30 Cap, Rfl: 2  •  omeprazole (PRILOSEC) 20 MG delayed-release capsule, Take 1 Cap by mouth every day., Disp: 90 Cap, Rfl: 3  •  busPIRone (BUSPAR) 5 MG tablet, Take 1 Tab by mouth 2 times a day., Disp: 60 Tab, Rfl: 5  •  lidocaine (LIDODERM) 5 % Patch, Apply 1 Patch to skin as directed every 24 hours., Disp: 10 Patch, Rfl: 2  •  baclofen (LIORESAL) 10 MG Tab, TAKE ONE TABLET BY MOUTH THREE TIMES DAILY (Patient not taking: Reported on 2/8/2019), Disp: 90 Tab,  "Rfl: 4  •  cholestyramine (QUESTRAN,PREVALITE) 4 GM Pack, Take 4 g by mouth every morning as needed., Disp: , Rfl:   •  melatonin 3 MG Tab, Take 6 mg by mouth every bedtime., Disp: , Rfl:   •  traZODone (DESYREL) 100 MG Tab, Take 100 mg by mouth every bedtime., Disp: , Rfl:   •  nystatin (MYCOSTATIN) 726051 UNIT/GM Cream topical cream, Apply 0.0001 g to affected area(s) 2 times a day., Disp: 1 Tube, Rfl: 3  •  furosemide (LASIX) 40 MG Tab, Take 80 mg by mouth every day., Disp: , Rfl:   •  cyanocobalamin ([employer] VITAMIN B-12) 500 MCG tablet, Take 500 mcg by mouth every day., Disp: , Rfl:   •  sodium bicarbonate (SODIUM BICARBONATE) 650 MG Tab, Take 650 mg by mouth 2 times a day., Disp: , Rfl:   •  aspirin EC (ECOTRIN) 81 MG Tablet Delayed Response, Take 1 Tab by mouth every day., Disp: 30 Tab, Rfl: 6    ALLERGIES:   Allergies   Allergen Reactions   • Cefdinir Shortness of Breath and Itching     Tolerated 1/18/17  Tolerates ceftriaxone    • Depakote [Divalproex Sodium] Unspecified     Muscle spasms/muscle pain and weakness     • Doxycycline Anaphylaxis and Vomiting     RXN=unknown   • Abilify Unspecified     Headaches/muscle twitching     • Amitriptyline Unspecified     Headaches     • Amoxicillin Rash     Pt states \"I get a rash\".     • Ciprofloxacin Rash     Pt states \"I get a rash\".     • Clindamycin Nausea     Even with food     • Ees [Erythromycin] Vomiting and Nausea   • Flagyl [Metronidazole Hcl] Unspecified     \"eye problems\"     • Flomax [Tamsulosin Hydrochloride] Swelling   • Metformin Unspecified     Increased lactic acid      • Sulfa Drugs Hives and Rash     RXN=since childhood  PATIENT DID FINE WITH BACTRIM X 2 COURSES 10/2018   • Tape Rash     Tears skin off   coban with Tegaderm tape ok  RXN=ongoing   • Vancomycin Itching     Pt becomes flushed in face and chest.   RXN=7/10/16   • Wound Dressing Adhesive Hives     By pt report   • Cephalexin [Keflex] Rash     Pt states she gets a rash with this " "medication  Tolerates ceftriaxone   • Levofloxacin Unspecified     Leg muscle cramps   • Metronidazole Rash     .   • Valproic Acid Rash     .       SURGHX:   Past Surgical History:   Procedure Laterality Date   • MUSCLE BIOPSY Right 1/26/2017    Procedure: MUSCLE BIOPSY - THIGH;  Surgeon: Isidro Vigil M.D.;  Location: SURGERY [facility];  Service:    • GASTROSCOPY WITH BALLOON DILATATION N/A 1/4/2017    Procedure: GASTROSCOPY WITH DILATATION;  Surgeon: Torres Vargas M.D.;  Location: SURGERY [facility];  Service:    • BOWEL STIMULATOR INSERTION  7/13/2016    Procedure: BOWEL STIMULATOR INSERTION FOR PERMANENT INTERSTIM SACRAL IMPLANT;  Surgeon: Joe Noyola M.D.;  Location: SURGERY [facility];  Service:    • RECOVERY  1/27/2016    Procedure: CATH LAB EP STUDY WITH SINUS NODE MODIFICATION LA;  Surgeon: [facility];  [place] [facility]  Service:    • OTHER CARDIAC SURGERY  1/2016    cardiac ablation   • NEURO DEST FACET L/S W/IG SNGL  4/21/2015    Performed by Reza Tabor at SURGERY [facility]   • LUMBAR FUSION ANTERIOR  8/21/2012    Performed by SUSIE SAWANT at SURGERY [facility]   • ALYSSA BY LAPAROSCOPY  8/29/2010    Performed by SHAYY JOHNS at SURGERY [facility]   • LAMINOTOMY     • OTHER ABDOMINAL SURGERY     • TONSILLECTOMY      tonsillectomy       SOCHX:  reports that she has never smoked. She has never used smokeless tobacco. She reports that she uses drugs, including Marijuana and Inhaled, about 7 times per week. She reports that she does not drink alcohol.    FH: Reviewed with patient, not pertinent to this visit.     Objective:   /68   Pulse 77   Temp 36.7 °C (98.1 °F)   Resp 16   Ht 1.651 m (5' 5\")   Wt 122.9 kg (271 lb)   LMP 02/18/2019 (Approximate)   SpO2 96%   BMI 45.10 kg/m²   Physical Exam   Constitutional: She is oriented to person, place, and time. She appears well-developed and well-nourished. No " distress.   HENT:   Head: Normocephalic and atraumatic.   Nose: Nose normal.   Eyes: Conjunctivae and EOM are normal.   Neck: Normal range of motion. No tracheal deviation present.   Cardiovascular: Normal rate, regular rhythm and intact distal pulses.    Pulmonary/Chest: Effort normal. No respiratory distress.   Musculoskeletal:   ROM normal all four extremities   Neurological: She is alert and oriented to person, place, and time.   Skin: Skin is warm and dry. Capillary refill takes less than 2 seconds.   Linear wound on plantar surface of right foot near heel. Tender to palpation. Wound is clean. No discharge, erythema, warmth.   Psychiatric: She has a normal mood and affect. Her behavior is normal. Judgment and thought content normal.       Assessment/Plan:   1. Wound of right foot    - Advised to continue previously prescribed medications  - Wound cleaned and dressed. Additional dressing supplies provided  - Advised to follow up here or with PCP in 2-4 days, then follow up with wound care at previously scheduled appointment  - Advised to return sooner if s/s infection worsen or do not improve    Differential diagnosis, natural history, supportive care, and indications for immediate follow-up discussed.

## 2019-03-11 ENCOUNTER — TELEPHONE (OUTPATIENT)
Dept: MEDICAL GROUP | Facility: MEDICAL CENTER | Age: 29
End: 2019-03-11

## 2019-03-11 NOTE — TELEPHONE ENCOUNTER
1. Caller Name: Kristin Balderrama                        Call Back Number: [phone] (home)     2. Message: pt wants you to know her left are getting colder and colder has circulation issues wants to know what to do?    3. Patient approves office to leave a detailed voicemail/My Chart message: yes

## 2019-03-13 ENCOUNTER — APPOINTMENT (OUTPATIENT)
Dept: WOUND CARE | Facility: MEDICAL CENTER | Age: 29
End: 2019-03-13
Attending: INTERNAL MEDICINE
Payer: MEDICARE

## 2019-03-14 ENCOUNTER — HOSPITAL ENCOUNTER (OUTPATIENT)
Dept: RADIOLOGY | Facility: MEDICAL CENTER | Age: 29
End: 2019-03-14
Attending: INTERNAL MEDICINE
Payer: MEDICARE

## 2019-03-14 DIAGNOSIS — N64.4 BREAST TENDERNESS IN FEMALE: ICD-10-CM

## 2019-03-14 PROCEDURE — 76642 ULTRASOUND BREAST LIMITED: CPT | Mod: LT

## 2019-03-14 PROCEDURE — G0279 TOMOSYNTHESIS, MAMMO: HCPCS

## 2019-03-15 ENCOUNTER — OFFICE VISIT (OUTPATIENT)
Dept: URGENT CARE | Facility: CLINIC | Age: 29
End: 2019-03-15
Payer: MEDICARE

## 2019-03-15 VITALS
HEART RATE: 67 BPM | DIASTOLIC BLOOD PRESSURE: 78 MMHG | SYSTOLIC BLOOD PRESSURE: 124 MMHG | RESPIRATION RATE: 16 BRPM | OXYGEN SATURATION: 96 % | TEMPERATURE: 99.5 F

## 2019-03-15 DIAGNOSIS — R06.02 SHORTNESS OF BREATH: ICD-10-CM

## 2019-03-15 DIAGNOSIS — R07.9 CHEST PAIN, UNSPECIFIED TYPE: ICD-10-CM

## 2019-03-15 DIAGNOSIS — N64.4 BREAST PAIN: ICD-10-CM

## 2019-03-15 PROCEDURE — 93000 ELECTROCARDIOGRAM COMPLETE: CPT | Performed by: NURSE PRACTITIONER

## 2019-03-15 PROCEDURE — 99213 OFFICE O/P EST LOW 20 MIN: CPT | Performed by: NURSE PRACTITIONER

## 2019-03-15 RX ORDER — NORETHINDRONE 0.35 MG
KIT ORAL
COMMUNITY
Start: 2019-03-06 | End: 2019-05-27

## 2019-03-15 RX ORDER — CEFDINIR 300 MG/1
CAPSULE ORAL
COMMUNITY
Start: 2019-03-06 | End: 2019-05-14

## 2019-03-15 NOTE — PROGRESS NOTES
Chief Complaint   Patient presents with   • Chest Pain   • Shortness of Breath   • Breast Pain         HISTORY OF PRESENT ILLNESS: Patient is a 28 y.o. female who presents to urgent care today with complaints of left-sided breast pain as well as chest pain and shortness of breath.  Notes that she has had left-sided breast pain for the past several weeks.  Was seen in emergency room for such, treated for suspected infectious process with cefdinir, the patient has completed the medication.  She had an ultrasound and a mammogram of this breast yesterday, no acute findings were found.  She is concerned as today her breast feels more swollen and painful.  She otherwise denies fever, chills, malaise.  She is here today with her grandmother who does admit to her starting progesterone 3 weeks ago for lack of menses and is concerned this may be a causation of her breast swelling and pain.  In addition, she admits to bilateral chest pain over the past hour along with shortness of breath.  She has a history of atrial fibrillation, and tachycardia requiring ablation, she sees a cardiologist.  She notes that she regularly has chest pain, has been seen in the emergency room numerous times for such, today it feels similar.  She is also diabetic, but notes that her blood sugars at home have been recently in the low 100s.          Patient Active Problem List    Diagnosis Date Noted   • Left leg weakness 07/14/2018     Priority: High   • Controlled type 2 diabetes mellitus without complication, without long-term current use of insulin ([facility]) 04/26/2017     Priority: High   • Sinus tachycardia 10/31/2013     Priority: High   • Chronic inflammatory arthritis 05/23/2016     Priority: Medium   • Morbid obesity with BMI of 45.0-49.9, adult ([facility]) 10/24/2017     Priority: Low   • Depression 10/28/2016     Priority: Low   • Schizophrenia ([facility]) 10/27/2016     Priority: Low   • Psychosis, schizophrenia, simple ([facility]) 09/29/2016     Priority: Low      Class: Chronic   • Acquired hypothyroidism 11/23/2015     Priority: Low   • PCOS (polycystic ovarian syndrome) 11/23/2015     Priority: Low   • Progressive focal motor weakness 06/28/2015     Priority: Low   • Fatty liver disease, nonalcoholic 01/19/2015     Priority: Low   • Anxiety 12/16/2014     Priority: Low   • Knee pain, right 02/13/2014     Priority: Low   • Neurogenic bladder 04/02/2011     Priority: Low   • Chronic UTI 09/18/2010     Priority: Low   • Borderline personality disorder in adult ([facility]) 09/18/2010     Priority: Low   • Asymptomatic bacteriuria 10/26/2018   • Palpitations 10/01/2018   • Chronic respiratory failure with hypoxia, on home oxygen therapy ([facility]) 08/08/2018   • Transaminitis 08/08/2018   • TONYA (obstructive sleep apnea) 01/09/2018   • Functional diarrhea 01/05/2018   • Breast wound 11/06/2017   • Intractable episodic cluster headache 09/14/2017   • Rash 06/09/2017   • Hashimoto's encephalopathy 05/17/2017   • Fall at home 05/13/2017   • On home oxygen therapy 04/15/2017   • Weakness of right upper extremity 02/23/2017   • Chronic suprapubic catheter ([facility]) 02/16/2017   • Hypovitaminosis D 11/29/2016   • Chronic pain syndrome 10/27/2016   • Bowel and bladder incontinence 10/27/2016   • Galactorrhea 07/22/2016   • Elevated sedimentation rate 06/27/2016   • Weakness of both lower extremities 06/22/2016   • Vitamin D deficiency 05/21/2016   • Morbidly obese ([facility]) 03/07/2016   • Scoliosis 03/07/2016   • GERD (gastroesophageal reflux disease) 03/07/2016   • Peripheral neuropathy ([facility]) 03/06/2016   • H/O prior ablation treatment 02/10/2016       Allergies:Cefdinir; Depakote [divalproex sodium]; Doxycycline; Abilify; Amitriptyline; Amoxicillin; Ciprofloxacin; Clindamycin; Ees [erythromycin]; Flagyl [metronidazole hcl]; Flomax [tamsulosin hydrochloride]; Metformin; Sulfa drugs; Tape; Vancomycin; Wound dressing adhesive; Cephalexin [keflex]; Levofloxacin; Metronidazole; and Valproic  acid    Current Outpatient Prescriptions Ordered in [facility]   Medication Sig Dispense Refill   • cefdinir (OMNICEF) 300 MG Cap      • DEBLITANE 0.35 MG tablet      • LYRICA 300 MG capsule      • ondansetron (ZOFRAN ODT) 4 MG TABLET DISPERSIBLE Take 1 Tab by mouth every 8 hours as needed. 10 Tab 6   • albuterol 108 (90 Base) MCG/ACT Aero Soln inhalation aerosol Inhale 2 Puffs by mouth every 6 hours as needed. 8.5 g 11   • ziprasidone (GEODON) 80 MG Cap TAKE TWO CAPSULES BY MOUTH DAILY AT BEDTIME 60 Cap 5   • methocarbamol (ROBAXIN) 500 MG Tab Take 500 mg by mouth 3 times a day.     • hydrocortisone (ANUSOL-HC) 25 MG Suppos Insert 1 Suppository in rectum every 12 hours. 10 Suppository 0   • Ivabradine HCl 7.5 MG Tab Take 7.5 mg by mouth 2 Times a Day. 60 Tab 2   • dicyclomine (BENTYL) 10 MG Cap Take 1 Cap by mouth every 6 hours as needed (abdominal cramping). 20 Cap 1   • lactulose 10 GM/15ML Solution Take 10 g by mouth 2 times a day as needed.     • vitamin D, Ergocalciferol, (DRISDOL) 73236 units Cap capsule Take 50,000 Units by mouth every 7 days.     • FLUoxetine (PROZAC) 10 MG Cap TAKE ONE CAPSULE BY MOUTH ONCE A DAY 30 Cap 2   • omeprazole (PRILOSEC) 20 MG delayed-release capsule Take 1 Cap by mouth every day. 90 Cap 3   • busPIRone (BUSPAR) 5 MG tablet Take 1 Tab by mouth 2 times a day. 60 Tab 5   • lidocaine (LIDODERM) 5 % Patch Apply 1 Patch to skin as directed every 24 hours. 10 Patch 2   • baclofen (LIORESAL) 10 MG Tab TAKE ONE TABLET BY MOUTH THREE TIMES DAILY (Patient not taking: Reported on 2/8/2019) 90 Tab 4   • cholestyramine (QUESTRAN,PREVALITE) 4 GM Pack Take 4 g by mouth every morning as needed.     • melatonin 3 MG Tab Take 6 mg by mouth every bedtime.     • traZODone (DESYREL) 100 MG Tab Take 100 mg by mouth every bedtime.     • nystatin (MYCOSTATIN) 621871 UNIT/GM Cream topical cream Apply 0.0001 g to affected area(s) 2 times a day. 1 Tube 3   • furosemide (LASIX) 40 MG Tab Take 80 mg by mouth every  "day.     • cyanocobalamin ([employer]S VITAMIN B-12) 500 MCG tablet Take 500 mcg by mouth every day.     • sodium bicarbonate (SODIUM BICARBONATE) 650 MG Tab Take 650 mg by mouth 2 times a day.     • aspirin EC (ECOTRIN) 81 MG Tablet Delayed Response Take 1 Tab by mouth every day. 30 Tab 6     No current Epic-ordered facility-administered medications on file.        Past Medical History:   Diagnosis Date   • Abdominal pain    • Anginal syndrome     random chest pain especially with tachycardia   • Apnea, sleep    • Arrhythmia     \"sinus tachycardia\", cariologist, Dr. Kumar; ablation 2/2016   • Arthritis     osteo   • ASTHMA     when around smoke   • Atrial fibrillation (HCC)    • Back pain    • Borderline personality disorder (HCC)    • Breath shortness     with tachycardia   • Cardiac arrhythmia    • Chickenpox    • Chronic UTI 9/18/2010   • Cough    • Daytime sleepiness    • Depression    • Diabetes (HCC)    • Diarrhea    • Disorder of thyroid    • Fall    • Fatigue    • Frequent headaches    • Gasping for breath    • Gynecological disorder     PCOS   • Headache(784.0)    • Heart burn    • History of falling    • Hypertension    • Migraine    • 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HCC)    • 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HCC)    • Nausea    • Obesity    • Pain 08-15-12    back, 7/10   • Painful joint    • PCOS (polycystic ovarian syndrome)    • Pneumonia 2012   • Psychosis (HCC)    • Renal disorder     \"kidney disease, stage 1\" nephrologist, Dr. 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   • Urinary bladder disorder     Suprapubic cath   • Urinary incontinence    • Weakness    • Wears glasses        Social History   Substance Use Topics   • Smoking status: Never Smoker   • Smokeless tobacco: Never Used   • Alcohol use " No       Family Status   Relation Status   • Mo Alive        44 2016   • MUnc Alive   • MGMo Alive   • Fa Alive        bio father hx unknown   • Sis Alive   • Sis Alive     Family History   Problem Relation Age of Onset   • Hypertension Mother    • Sleep Apnea Mother    • Heart Disease Mother    • Other Mother         hypothryod   • Hypertension Maternal Uncle    • Heart Disease Maternal Grandmother    • Hypertension Maternal Grandmother    • No Known Problems Sister    • Other Sister         Narcolepsy;fibromyalsia;bone;nerve   • Genitourinary () Sister         endometriosis       ROS:  Review of Systems   Constitutional: Negative for fever, chills, weight loss, malaise, and fatigue.   HENT: Negative for ear pain, nosebleeds, congestion, sore throat and neck pain.    Eyes: Negative for vision changes.   Neuro: Negative for headache, sensory changes, weakness, seizure, LOC.   Cardiovascular: Positive for chest pain. Negative for palpitations, orthopnea and leg swelling.   Respiratory: Positive for shortness of breath.  Negative for cough, sputum production, and wheezing.   Gastrointestinal: Negative for abdominal pain, nausea, vomiting or diarrhea.   Genitourinary: Negative for dysuria, urgency and frequency.  Musculoskeletal: Positive for left-sided breast pain.  Negative for falls, neck pain, back pain, joint pain, myalgias.   Skin: Negative for rash, diaphoresis.     Exam:  Blood pressure 124/78, pulse 67, temperature 37.5 °C (99.5 °F), temperature source Temporal, resp. rate 16, last menstrual period 02/18/2019, SpO2 96 %, not currently breastfeeding.  General: well-nourished, well-developed female in NAD  Head: normocephalic, atraumatic  Eyes: PERRLA, no conjunctival injection, acuity grossly intact, lids normal.  Ears: normal shape and symmetry, no tenderness, no discharge. External canals are without any significant edema or erythema. Tympanic membranes are without any inflammation, no effusion. Gross  auditory acuity is intact.  Nose: symmetrical without tenderness, no discharge.  Mouth/Throat: reasonable hygiene, no erythema, exudates or tonsillar enlargement.  Neck: no masses, range of motion within normal limits, no tracheal deviation. No obvious thyroid enlargement.   Lymph: no cervical adenopathy. No supraclavicular adenopathy.   Neuro: alert and oriented. Cranial nerves 1-12 grossly intact. No sensory deficit.   Cardiovascular: regular rate and rhythm. No edema.  Pulmonary: no distress. Chest is symmetrical with respiration, no wheezes, crackles, or rhonchi.  She is wearing oxygen upon examination.  GYN: Left breast is without swelling, erythema, or signs of abscess.  There are several scattered healing lesions which appear to be former sites of localized skin infection.  No signs of infectious process upon examination today.   Musculoskeletal: no clubbing, appropriate muscle tone, patient elects to use a wheelchair.  Skin: warm, dry, no clubbing, no cyanosis, no rashes.   Psych: appropriate mood, affect, judgement.         Assessment/Plan:  1. Chest pain, unspecified type  EKG    UC AMA/Refusal of Treatment   2. Shortness of breath  EKG    UC AMA/Refusal of Treatment   3. Breast pain           12-lead study EKG interpretation, interpreted by myself.  The rhythm is sinus with a rate of 66.  There is no ectopy. There are no acute ST segment changes.  T wave abnormalities noted in V3, V4.  Interpretation: No ST segment elevation myocardial infarction, interpreted as an abnormal EKG.    Patient is here today for breast pain but also notes chest pain and shortness of breath as well.  Her left breast is without tenderness and without swelling.  I have reviewed her ultrasound and mammogram from yesterday which do not show any acute abnormalities.  I discussed with the patient her pain and swelling may be secondary to recent infection and/or recent progesterone use.  I have instructed the patient to contact her  OB/GYN to discuss.  Regarding her chest pain, she does have risk factors including obesity, tachycardia, atrial fibrillation, and 24-hour home oxygen use.  Her EKG is without significant changes from previous EKG.  Nevertheless, I discussed the patient I cannot exclude cardiac etiology in the urgent care setting, she is in agreement and understanding.  I have instructed the patient to go to the emergency department today, she is politely declining, deciding against medical advice. I discussed with the patient the risks of deciding against receiving appropriate care to include death. The patient is not intoxicated. The patient is a capable decision maker and understands the risks and benefits of her decision. While I certainly disagree with the patient's decision, I respect the patient's autonomy and will not keep her here against her will. She understands that her decision to decline further care can be reversed at any time.  Her grandmother is involved with the discussion and also understands my concern. I have asked the patient to sign an AMA form and MA to witness this signature.             Please note that this dictation was created using voice recognition software. I have made every reasonable attempt to correct obvious errors, but I expect that there are errors of grammar and possibly content that I did not discover before finalizing the note.      PER Mehta.

## 2019-03-19 ENCOUNTER — HOSPITAL ENCOUNTER (OUTPATIENT)
Dept: RADIOLOGY | Facility: MEDICAL CENTER | Age: 29
End: 2019-03-19
Attending: NURSE PRACTITIONER
Payer: MEDICARE

## 2019-03-19 ENCOUNTER — OFFICE VISIT (OUTPATIENT)
Dept: MEDICAL GROUP | Facility: MEDICAL CENTER | Age: 29
End: 2019-03-19
Payer: MEDICARE

## 2019-03-19 VITALS
SYSTOLIC BLOOD PRESSURE: 124 MMHG | RESPIRATION RATE: 16 BRPM | HEART RATE: 74 BPM | WEIGHT: 270 LBS | DIASTOLIC BLOOD PRESSURE: 70 MMHG | HEIGHT: 65 IN | TEMPERATURE: 97.1 F | OXYGEN SATURATION: 97 % | BODY MASS INDEX: 44.98 KG/M2

## 2019-03-19 DIAGNOSIS — M25.531 RIGHT WRIST PAIN: ICD-10-CM
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
## 2019-03-19 DIAGNOSIS — M25.521 RIGHT ELBOW PAIN: ICD-10-CM
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
## 2019-03-19 PROCEDURE — 73080 X-RAY EXAM OF ELBOW: CPT | Mod: RT

## 2019-03-19 PROCEDURE — 73110 X-RAY EXAM OF WRIST: CPT | Mod: RT

## 2019-03-19 PROCEDURE — 99214 OFFICE O/P EST MOD 30 MIN: CPT | Performed by: NURSE PRACTITIONER

## 2019-03-19 RX ORDER — NYSTATIN 100000 U/G
1 CREAM TOPICAL 2 TIMES DAILY
Qty: 1 TUBE | Refills: 3 | Status: SHIPPED | OUTPATIENT
Start: 2019-03-19 | End: 2019-05-27

## 2019-03-19 RX ORDER — IBUPROFEN 600 MG/1
600 TABLET ORAL EVERY 8 HOURS PRN
Qty: 15 TAB | Refills: 0 | Status: SHIPPED | OUTPATIENT
Start: 2019-03-19 | End: 2019-03-24

## 2019-03-19 NOTE — PROGRESS NOTES
"CC: Fall (fell backwards yesterday and hit head, right elbow and hit right wrist. Painful. Nystatin cream for genital areas for red spots. Would like referral to [facility] that is excepting new patients. )        HPI:     Kristin is a Brody patient, she is known to me, problems discussed today are new to me, presents today for the followin. Fall in home, initial encounter/Right elbow pain/Right wrist pain  States yesterday she felt \"backward\".  Caught herself with her right hand.  Subsequent right wrist and elbow pain.  She has been icing at home and is currently using an Ace bandage over the right wrist.  She states that she \"just wants to get function in her dominant arm again\" stating that since yesterday she has not been able to be writing or using it.  States pain on the ulnar aspect of the right wrist.  Elbow pain and unable to straighten.  Also has some shoulder pain that radiates down to the arm with a chronic issue of lots of \"tears\".    She did take 400 mg of over-the-counter ibuprofen yesterday and tolerated it fine.  Is also taking Tylenol.    Current Outpatient Prescriptions   Medication Sig Dispense Refill   • ibuprofen (MOTRIN) 600 MG Tab Take 1 Tab by mouth every 8 hours as needed for Moderate Pain or Inflammation for up to 5 days. 15 Tab 0   • nystatin (MYCOSTATIN) 991509 UNIT/GM Cream topical cream Apply 0.0001 g to affected area(s) 2 times a day. 1 Tube 3   • cefdinir (OMNICEF) 300 MG Cap      • DEBLITANE 0.35 MG tablet      • LYRICA 300 MG capsule      • ondansetron (ZOFRAN ODT) 4 MG TABLET DISPERSIBLE Take 1 Tab by mouth every 8 hours as needed. 10 Tab 6   • albuterol 108 (90 Base) MCG/ACT Aero Soln inhalation aerosol Inhale 2 Puffs by mouth every 6 hours as needed. 8.5 g 11   • ziprasidone (GEODON) 80 MG Cap TAKE TWO CAPSULES BY MOUTH DAILY AT BEDTIME 60 Cap 5   • methocarbamol (ROBAXIN) 500 MG Tab Take 500 mg by mouth 3 times a day.     • hydrocortisone (ANUSOL-HC) 25 MG Suppos Insert " 1 Suppository in rectum every 12 hours. 10 Suppository 0   • Ivabradine HCl 7.5 MG Tab Take 7.5 mg by mouth 2 Times a Day. 60 Tab 2   • dicyclomine (BENTYL) 10 MG Cap Take 1 Cap by mouth every 6 hours as needed (abdominal cramping). 20 Cap 1   • lactulose 10 GM/15ML Solution Take 10 g by mouth 2 times a day as needed.     • vitamin D, Ergocalciferol, (DRISDOL) 59649 units Cap capsule Take 50,000 Units by mouth every 7 days.     • FLUoxetine (PROZAC) 10 MG Cap TAKE ONE CAPSULE BY MOUTH ONCE A DAY 30 Cap 2   • omeprazole (PRILOSEC) 20 MG delayed-release capsule Take 1 Cap by mouth every day. 90 Cap 3   • busPIRone (BUSPAR) 5 MG tablet Take 1 Tab by mouth 2 times a day. 60 Tab 5   • lidocaine (LIDODERM) 5 % Patch Apply 1 Patch to skin as directed every 24 hours. 10 Patch 2   • baclofen (LIORESAL) 10 MG Tab TAKE ONE TABLET BY MOUTH THREE TIMES DAILY (Patient not taking: Reported on 2/8/2019) 90 Tab 4   • cholestyramine (QUESTRAN,PREVALITE) 4 GM Pack Take 4 g by mouth every morning as needed.     • melatonin 3 MG Tab Take 6 mg by mouth every bedtime.     • traZODone (DESYREL) 100 MG Tab Take 100 mg by mouth every bedtime.     • furosemide (LASIX) 40 MG Tab Take 80 mg by mouth every day.     • cyanocobalamin ([employer] VITAMIN B-12) 500 MCG tablet Take 500 mcg by mouth every day.     • sodium bicarbonate (SODIUM BICARBONATE) 650 MG Tab Take 650 mg by mouth 2 times a day.     • aspirin EC (ECOTRIN) 81 MG Tablet Delayed Response Take 1 Tab by mouth every day. 30 Tab 6     No current facility-administered medications for this visit.      Social History   Substance Use Topics   • Smoking status: Never Smoker   • Smokeless tobacco: Never Used   • Alcohol use No     I reviewed patients allergies, problem list and medications today in EPIC.    ROS: Any/all pertinent positives listed in the HPI, otherwise all others reviewed are negative today.      /70 (BP Location: Left arm, Patient Position: Sitting, BP Cuff Size: Adult)    "Pulse 74   Temp 36.2 °C (97.1 °F) (Temporal)   Resp 16   Ht 1.651 m (5' 5\")   Wt 122.5 kg (270 lb)   LMP 02/18/2019 (Approximate)   SpO2 97%   BMI 44.93 kg/m²     Exam:    Gen: Alert and oriented, No apparent distress. WDWN  Psych: A+Ox3, normal affect and mood  Skin: Warm, dry and intact. Good turgor   No rashes in visible areas.  Eye: Conjunctiva clear, lids normal  ENMT: Lips without lesions, fair dentition  Lungs: Unlabored respiratory effort.   Ext: No clubbing, cyanosis, edema.   Normal gait with her walker today    Left wrist shows no gross deformity erythema or appreciable edema in comparison to the left.  She does have restricted range of motion and appears to be sensitive in general to all aspects of the wrist with light touch.  Pulses intact.  Hand warm and dry.  Unable to straighten her right elbow.  States because there is pain in her elbow but limits to range of motion.  Again no gross deformity erythema edema of the elbow equally of the upper arm.    Assessment and Plan.   28 y.o. female with the following issues.    1. Fall in home, initial encounter/ Right elbow pain/Right wrist pain  Stable.  X-rays ordered today and she will complete them downstairs for wrist and elbow.  We will do ibuprofen 600 mg with food 3 times a day for up to 5 days.  She does currently have normal kidney function.  Tolerates ibuprofen well with multiple other allergies.  She can continue Ace bandage and continue icing.  Patient and I both suspect currently is a sprain.  - ibuprofen (MOTRIN) 600 MG Tab; Take 1 Tab by mouth every 8 hours as needed for Moderate Pain or Inflammation for up to 5 days.  Dispense: 15 Tab; Refill: 0  - DX-WRIST-COMPLETE 3+ RIGHT; Future        Request for referral to [facility] regarding her chronic idiopathic issues will be forwarded to her PCP and she will be notified via his office/assistant  "

## 2019-03-22 ENCOUNTER — TELEPHONE (OUTPATIENT)
Dept: MEDICAL GROUP | Facility: MEDICAL CENTER | Age: 29
End: 2019-03-22

## 2019-03-22 NOTE — TELEPHONE ENCOUNTER
1. Caller Name: Kristin                                         Call Back Number: [phone]      Patient approves a detailed voicemail message: N\A    Patient called and was wondering if Luba Chan sent you a note per her last office note about doing a referral to the [facility] for the patient. I do not see that it was done? Please advise. Patient already notified provider out of the office until 04/01.

## 2019-03-25 ENCOUNTER — PATIENT OUTREACH (OUTPATIENT)
Dept: HEALTH INFORMATION MANAGEMENT | Facility: OTHER | Age: 29
End: 2019-03-25

## 2019-03-25 ENCOUNTER — OFFICE VISIT (OUTPATIENT)
Dept: MEDICAL GROUP | Age: 29
End: 2019-03-25
Payer: MEDICARE

## 2019-03-25 ENCOUNTER — HOSPITAL ENCOUNTER (OUTPATIENT)
Facility: MEDICAL CENTER | Age: 29
End: 2019-03-25
Attending: INTERNAL MEDICINE
Payer: MEDICARE

## 2019-03-25 VITALS
DIASTOLIC BLOOD PRESSURE: 82 MMHG | TEMPERATURE: 98.8 F | BODY MASS INDEX: 44.98 KG/M2 | SYSTOLIC BLOOD PRESSURE: 120 MMHG | HEIGHT: 65 IN | HEART RATE: 79 BPM | WEIGHT: 270 LBS | OXYGEN SATURATION: 98 %

## 2019-03-25 DIAGNOSIS — R31.9 URINARY TRACT INFECTION WITH HEMATURIA, SITE UNSPECIFIED: ICD-10-CM

## 2019-03-25 DIAGNOSIS — N39.0 URINARY TRACT INFECTION WITH HEMATURIA, SITE UNSPECIFIED: ICD-10-CM

## 2019-03-25 DIAGNOSIS — E66.01 MORBID OBESITY WITH BMI OF 45.0-49.9, ADULT (HCC): ICD-10-CM

## 2019-03-25 DIAGNOSIS — R30.9 PAINFUL URINATION: ICD-10-CM

## 2019-03-25 DIAGNOSIS — E03.9 ACQUIRED HYPOTHYROIDISM: ICD-10-CM

## 2019-03-25 DIAGNOSIS — E11.9 CONTROLLED TYPE 2 DIABETES MELLITUS WITHOUT COMPLICATION, WITHOUT LONG-TERM CURRENT USE OF INSULIN (HCC): ICD-10-CM

## 2019-03-25 LAB
APPEARANCE UR: CLEAR
BILIRUB UR STRIP-MCNC: NEGATIVE MG/DL
COLOR UR AUTO: YELLOW
GLUCOSE UR STRIP.AUTO-MCNC: NEGATIVE MG/DL
KETONES UR STRIP.AUTO-MCNC: NEGATIVE MG/DL
LEUKOCYTE ESTERASE UR QL STRIP.AUTO: NORMAL
NITRITE UR QL STRIP.AUTO: NEGATIVE
PH UR STRIP.AUTO: 6.5 [PH] (ref 5–8)
PROT UR QL STRIP: NEGATIVE MG/DL
RBC UR QL AUTO: NORMAL
SP GR UR STRIP.AUTO: 1.02
UROBILINOGEN UR STRIP-MCNC: 0.2 MG/DL

## 2019-03-25 PROCEDURE — 87086 URINE CULTURE/COLONY COUNT: CPT

## 2019-03-25 PROCEDURE — 81002 URINALYSIS NONAUTO W/O SCOPE: CPT | Performed by: INTERNAL MEDICINE

## 2019-03-25 PROCEDURE — 99214 OFFICE O/P EST MOD 30 MIN: CPT | Performed by: INTERNAL MEDICINE

## 2019-03-25 RX ORDER — NITROFURANTOIN 25; 75 MG/1; MG/1
100 CAPSULE ORAL 2 TIMES DAILY
Qty: 20 CAP | Refills: 1 | Status: SHIPPED | OUTPATIENT
Start: 2019-03-25 | End: 2019-05-14

## 2019-03-25 ASSESSMENT — ENCOUNTER SYMPTOMS
FLANK PAIN: 1
COUGH: 0
MYALGIAS: 0
CHILLS: 0
FEVER: 0
ABDOMINAL PAIN: 1

## 2019-03-25 NOTE — PROGRESS NOTES
"Subjective:      Kristin Balderrama is a 28 y.o. female who presents with Painful Urination (x3 days )      HPI  The patient has a medical history of septic UTI's and is here for bilateral flank pain that radiates to her lower abdomen with associated urinary frequency and painful urination onset 4 days ago. She describes her painful urination as a pressure but denies any burning sensation. She also endorses droplets of dark urine that has the appearance of old blood when she urinates. She is more fatigued than normal but otherwise denies fever and chills. Additionally, patient states that she has not been drinking water as she states \"water and I don't mix.\"    She is also here for followup of chronic medical problems listed below. She has a history of diabetes which she does not take any medication for as she states this is well under control. She also reports intermittent kidney problems which she takes sodium bicarb for. The patient is compliant with medications and having no side effects from them. She is not currently taking any thyroid medication. Her thyroid was last checked in September and was normal. Patient is being followed by Dr. Daigle (Psychiatry). Denies chest pain, dyspnea, myalgias, or cough.    Patient Active Problem List   Diagnosis   • Chronic UTI   • Borderline personality disorder in adult (HCC)   • Neurogenic bladder   • Sinus tachycardia   • Knee pain, right   • Anxiety   • Fatty liver disease, nonalcoholic   • Progressive focal motor weakness   • Acquired hypothyroidism   • PCOS (polycystic ovarian syndrome)   • H/O prior ablation treatment   • Peripheral neuropathy (CMS-HCC)   • Morbidly obese ([facility])   • Scoliosis   • GERD (gastroesophageal reflux disease)   • Vitamin D deficiency   • Chronic inflammatory arthritis   • Weakness of both lower extremities   • Elevated sedimentation rate   • Galactorrhea   • Psychosis, schizophrenia, simple (HCC)   • Schizophrenia ([facility])   • Chronic pain " syndrome   • Bowel and bladder incontinence   • Depression   • Hypovitaminosis D   • Chronic suprapubic catheter ([facility])   • Weakness of right upper extremity   • On home oxygen therapy   • Controlled type 2 diabetes mellitus without complication, without long-term current use of insulin ([facility])   • Hashimoto's encephalopathy   • Rash   • Fall at home   • Intractable episodic cluster headache   • Morbid obesity with BMI of 45.0-49.9, adult ([facility])   • Breast wound   • Functional diarrhea   • TONYA (obstructive sleep apnea)   • Left leg weakness   • Chronic respiratory failure with hypoxia, on home oxygen therapy ([facility])   • Transaminitis   • Palpitations   • Asymptomatic bacteriuria       Outpatient Medications Prior to Visit   Medication Sig Dispense Refill   • nystatin (MYCOSTATIN) 667337 UNIT/GM Cream topical cream Apply 0.0001 g to affected area(s) 2 times a day. 1 Tube 3   • cefdinir (OMNICEF) 300 MG Cap      • DEBLITANE 0.35 MG tablet      • LYRICA 300 MG capsule      • ondansetron (ZOFRAN ODT) 4 MG TABLET DISPERSIBLE Take 1 Tab by mouth every 8 hours as needed. 10 Tab 6   • albuterol 108 (90 Base) MCG/ACT Aero Soln inhalation aerosol Inhale 2 Puffs by mouth every 6 hours as needed. 8.5 g 11   • ziprasidone (GEODON) 80 MG Cap TAKE TWO CAPSULES BY MOUTH DAILY AT BEDTIME 60 Cap 5   • methocarbamol (ROBAXIN) 500 MG Tab Take 500 mg by mouth 3 times a day.     • hydrocortisone (ANUSOL-HC) 25 MG Suppos Insert 1 Suppository in rectum every 12 hours. 10 Suppository 0   • Ivabradine HCl 7.5 MG Tab Take 7.5 mg by mouth 2 Times a Day. 60 Tab 2   • dicyclomine (BENTYL) 10 MG Cap Take 1 Cap by mouth every 6 hours as needed (abdominal cramping). 20 Cap 1   • lactulose 10 GM/15ML Solution Take 10 g by mouth 2 times a day as needed.     • vitamin D, Ergocalciferol, (DRISDOL) 93495 units Cap capsule Take 50,000 Units by mouth every 7 days.     • FLUoxetine (PROZAC) 10 MG Cap TAKE ONE CAPSULE BY MOUTH ONCE A DAY 30 Cap 2   • omeprazole  "(PRILOSEC) 20 MG delayed-release capsule Take 1 Cap by mouth every day. 90 Cap 3   • busPIRone (BUSPAR) 5 MG tablet Take 1 Tab by mouth 2 times a day. 60 Tab 5   • lidocaine (LIDODERM) 5 % Patch Apply 1 Patch to skin as directed every 24 hours. 10 Patch 2   • baclofen (LIORESAL) 10 MG Tab TAKE ONE TABLET BY MOUTH THREE TIMES DAILY 90 Tab 4   • cholestyramine (QUESTRAN,PREVALITE) 4 GM Pack Take 4 g by mouth every morning as needed.     • melatonin 3 MG Tab Take 6 mg by mouth every bedtime.     • traZODone (DESYREL) 100 MG Tab Take 100 mg by mouth every bedtime.     • furosemide (LASIX) 40 MG Tab Take 80 mg by mouth every day.     • cyanocobalamin ([employer] VITAMIN B-12) 500 MCG tablet Take 500 mcg by mouth every day.     • sodium bicarbonate (SODIUM BICARBONATE) 650 MG Tab Take 650 mg by mouth 2 times a day.     • aspirin EC (ECOTRIN) 81 MG Tablet Delayed Response Take 1 Tab by mouth every day. 30 Tab 6     No facility-administered medications prior to visit.         Allergies   Allergen Reactions   • Cefdinir Shortness of Breath and Itching     Tolerated 1/18/17  Tolerates ceftriaxone    • Depakote [Divalproex Sodium] Unspecified     Muscle spasms/muscle pain and weakness     • Doxycycline Anaphylaxis and Vomiting     RXN=unknown   • Abilify Unspecified     Headaches/muscle twitching     • Amitriptyline Unspecified     Headaches     • Amoxicillin Rash     Pt states \"I get a rash\".     • Ciprofloxacin Rash     Pt states \"I get a rash\".     • Clindamycin Nausea     Even with food     • Ees [Erythromycin] Vomiting and Nausea   • Flagyl [Metronidazole Hcl] Unspecified     \"eye problems\"     • Flomax [Tamsulosin Hydrochloride] Swelling   • Metformin Unspecified     Increased lactic acid      • Sulfa Drugs Hives and Rash     RXN=since childhood  PATIENT DID FINE WITH BACTRIM X 2 COURSES 10/2018   • Tape Rash     Tears skin off   coban with Tegaderm tape ok  RXN=ongoing   • Vancomycin Itching     Pt becomes flushed in face and " "chest.   RXN=7/10/16   • Wound Dressing Adhesive Hives     By pt report   • Cephalexin [Keflex] Rash     Pt states she gets a rash with this medication  Tolerates ceftriaxone   • Levofloxacin Unspecified     Leg muscle cramps   • Metronidazole Rash     .   • Valproic Acid Rash     .       Review of Systems   Constitutional: Positive for malaise/fatigue. Negative for chills and fever.   Respiratory: Negative for cough.         No dyspnea   Cardiovascular: Negative for chest pain.   Gastrointestinal: Positive for abdominal pain.   Genitourinary: Positive for flank pain, frequency and hematuria (droplets of dark blood). Negative for dysuria.        Positive for painful urination   Musculoskeletal: Negative for myalgias.   All other systems reviewed and are negative.       Objective:     /82 (BP Location: Left arm, Patient Position: Sitting, BP Cuff Size: Adult)   Pulse 79   Temp 37.1 °C (98.8 °F) (Tympanic)   Ht 1.651 m (5' 5\")   Wt 122.5 kg (270 lb)   SpO2 98%   BMI 44.93 kg/m²     Physical Exam   Constitutional: Oriented to person, place, and time. Appears well-developed and well-nourished. No distress.   Head: Normocephalic and atraumatic.   Right Ear: External ear normal.   Left Ear: External ear normal.   Nose: Nose normal.   Mouth/Throat: Oropharynx is clear and moist. No oropharyngeal exudate.   Eyes: Pupils are equal, round, and reactive to light. Conjunctivae and EOM are normal. Right eye exhibits no discharge. Left eye exhibits no discharge. No scleral icterus.   Neck: Normal range of motion. Neck supple. No JVD present. No tracheal deviation present. No thyromegaly present.   Cardiovascular: Normal rate, regular rhythm, normal heart sounds and intact distal pulses.  Exam reveals no gallop and no friction rub.    No murmur heard.  Pulmonary/Chest: Effort normal. No stridor. No respiratory distress. No wheezing or rales. No tenderness.   Abdominal: Soft. Bowel sounds are normal. No distension and " no mass. There is no tenderness. There is no rebound and no guarding. No hernia.   Musculoskeletal: Normal range of motion No edema or tenderness.   Lymphadenopathy: No cervical adenopathy.   Neurological: Alert and oriented to person, place, and time. Normal reflexes. Normal reflexes. No cranial nerve deficit. Normal muscle tone. Coordination normal.   Skin: Skin is warm and dry. No rash noted. Not diaphoretic. No erythema. No pallor.   Psychiatric: Normal mood and affect. Behavior is normal. Judgment and thought content normal.   Nursing note and vitals reviewed.      Lab Results   Component Value Date/Time    HBA1C 5.7 10/29/2018 11:12 AM    HBA1C 6.8 (H) 07/14/2018 05:56 AM     Lab Results   Component Value Date/Time    SODIUM 137 01/31/2019 01:14 PM    POTASSIUM 4.0 01/31/2019 01:14 PM    CHLORIDE 106 01/31/2019 01:14 PM    CO2 23 01/31/2019 01:14 PM    GLUCOSE 91 01/31/2019 01:14 PM    BUN 16 01/31/2019 01:14 PM    CREATININE 1.01 01/31/2019 01:14 PM    CREATININE 0.75 (L) 07/20/2010 11:00 AM    BUNCREATRAT 19 07/20/2010 11:00 AM    GLOMRATE >59 07/20/2010 11:00 AM    ALKPHOSPHAT 64 01/31/2019 01:14 PM    ASTSGOT 21 01/31/2019 01:14 PM    ALTSGPT 38 01/31/2019 01:14 PM    TBILIRUBIN 0.5 01/31/2019 01:14 PM     Lab Results   Component Value Date/Time    INR 1.00 01/31/2019 01:14 PM    INR 1.09 01/25/2019 11:09 AM    INR 0.99 10/25/2018 11:08 AM     Lab Results   Component Value Date/Time    CHOLSTRLTOT 173 11/20/2018 07:17 AM    LDL 96 11/20/2018 07:17 AM    HDL 50 11/20/2018 07:17 AM    TRIGLYCERIDE 134 11/20/2018 07:17 AM       No results found for: TESTOSTERONE  No results found for: TSH  Lab Results   Component Value Date/Time    FREET4 0.86 09/07/2018 06:52 PM    FREET4 0.84 05/17/2017 10:23 AM     Lab Results   Component Value Date/Time    URICACID 6.7 11/20/2018 07:17 AM     No components found for: VITB12  Lab Results   Component Value Date/Time    25HYDROXY 18 (L) 11/20/2018 07:17 AM    25HYDROXY 21  (L) 09/07/2018 06:52 PM          Assessment/Plan:     Patient will follow-up with Dr. Brody.     1. Urinary tract infection with hematuria, site unspecified  Patient presents with symptoms consistent with UTI. Urinalysis indicated that she does have a bladder infection. She will be started on Macrobid.   - nitrofurantoin monohyd macro (MACROBID) 100 MG Cap; Take 1 Cap by mouth 2 times a day.  Dispense: 20 Cap; Refill: 1  - URINE CULTURE(NEW); Future    2. Painful urination  See 1 above.   - POCT Urinalysis  - URINE CULTURE(NEW); Future    3. Controlled type 2 diabetes mellitus without complication, without long-term current use of insulin (HCC)  Patient states she does not take insulin or any other medications for her obesity as it is under good control.  diet/exercise/lose 15 lbs.; patient counseled     4. Acquired hypothyroidism  Under good control. Continue same regimen.     5. Morbid obesity with BMI of 45.0-49.9, adult (HCC)  diet/exercise/lose 15 lbs.; patient counseled         30 minute face-to-face encounter took place today.  More than half of this time was spent in the coordination of care of the above problems, as well as counseling.     Sinai RODRIGUEZ (Eva), am scribing for, and in the presence of, Reinier Walden M.D..    Electronically signed by: Sinai York (Eva), 3/25/2019    Reinier RODRIGUEZ M.D., personally performed the services described in this documentation, as scribed by iSnai York in my presence, and it is both accurate and complete.

## 2019-03-25 NOTE — TELEPHONE ENCOUNTER
This was sent to Dr Brody to complete.  This was discussed with the patient when I saw her last week

## 2019-03-26 ENCOUNTER — PATIENT OUTREACH (OUTPATIENT)
Dept: HEALTH INFORMATION MANAGEMENT | Facility: OTHER | Age: 29
End: 2019-03-26

## 2019-03-26 ENCOUNTER — TELEPHONE (OUTPATIENT)
Dept: HEALTH INFORMATION MANAGEMENT | Facility: OTHER | Age: 29
End: 2019-03-26

## 2019-03-26 NOTE — PROGRESS NOTES
"SBAR Documentation: Situation, Background, Assessment, Recommendation     Situation    Incoming call from patient stating she is not feeling well today and was having difficulty reaching the MA line with PCP's office.    Background       Complex medical history. Frequent UTI, sinus tachycardia with hx of ablation-followed by cardiology, chronic respiratory failure with home O2 in place.   Assessment    Patient reports new symptoms since this am: heart rate is elevated around 110, temperature is 100F, and lungs \"burning\" during respirations. Patient seen in office yesterday for painful urination. Patient is requesting return call to her cell phone ([phone]) from provider's office to discuss symptoms and plan of action.    Recommendation CCM RN will send message to provider pool requesting TC to patient to discuss symptoms.    Escalation Protocol: (Comment: Physician office phone call requested)     "

## 2019-03-26 NOTE — TELEPHONE ENCOUNTER
"Hello,     Received incoming call from Kristin Balderrama requesting a call from the office to discuss new symptoms from this am:    Patient reports heart rate is elevated around 110, temperature is 100F, and lungs \"burning\" during respirations. Patient seen in office yesterday for painful urination. Patient is requesting return call to her cell phone ([phone]) from provider's office to discuss symptoms and plan of action.     Please let me know if there is anything I can assist with.    Thank you,    Fatoumata Ramsey R.N. Care Coordinator  [facility]  [phone]          "

## 2019-03-28 LAB
BACTERIA UR CULT: NORMAL
SIGNIFICANT IND 70042: NORMAL
SITE SITE: NORMAL
SOURCE SOURCE: NORMAL

## 2019-03-29 ENCOUNTER — OFFICE VISIT (OUTPATIENT)
Dept: URGENT CARE | Facility: CLINIC | Age: 29
End: 2019-03-29
Payer: MEDICARE

## 2019-03-29 ENCOUNTER — APPOINTMENT (OUTPATIENT)
Dept: RADIOLOGY | Facility: IMAGING CENTER | Age: 29
End: 2019-03-29
Attending: PHYSICIAN ASSISTANT
Payer: MEDICARE

## 2019-03-29 VITALS
WEIGHT: 270 LBS | BODY MASS INDEX: 44.98 KG/M2 | DIASTOLIC BLOOD PRESSURE: 74 MMHG | TEMPERATURE: 99 F | RESPIRATION RATE: 16 BRPM | HEIGHT: 65 IN | OXYGEN SATURATION: 94 % | HEART RATE: 86 BPM | SYSTOLIC BLOOD PRESSURE: 124 MMHG

## 2019-03-29 DIAGNOSIS — M25.531 RIGHT WRIST PAIN: ICD-10-CM

## 2019-03-29 DIAGNOSIS — S63.501A SPRAIN OF RIGHT WRIST, INITIAL ENCOUNTER: ICD-10-CM
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
## 2019-03-29 PROCEDURE — 73110 X-RAY EXAM OF WRIST: CPT | Mod: TC,RT | Performed by: PHYSICIAN ASSISTANT

## 2019-03-29 PROCEDURE — 99214 OFFICE O/P EST MOD 30 MIN: CPT | Performed by: PHYSICIAN ASSISTANT

## 2019-03-29 RX ORDER — IBUPROFEN 600 MG/1
600 TABLET ORAL EVERY 6 HOURS PRN
COMMUNITY
End: 2019-04-09

## 2019-03-29 ASSESSMENT — ENCOUNTER SYMPTOMS
SENSORY CHANGE: 1
FALLS: 1
FOCAL WEAKNESS: 1
TINGLING: 1

## 2019-03-29 NOTE — PROGRESS NOTES
"Subjective:   Kristin Balderrama is a 28 y.o. female who presents for Wrist Injury ((R) wrist. pt states she had a previous injury to her wrist one week ago and fell on it again yesterday and landed on her (R) wrist)    This is a new problem.  Patient presents to urgent care with pain in the right wrist after fall that occurred yesterday.  She states that she tripped over a rug yesterday and fell onto an outstretched hand on the right.  She reports that she did injure this wrist 1 week ago however this is a new injury.  Patient reports that her wrist feels like it is \"crunching\".  Pain is worse with movement and better with rest.  Patient is right-hand dominant.        Wrist Injury    Associated symptoms include tingling.     Review of Systems   Musculoskeletal: Positive for falls and joint pain.   Neurological: Positive for tingling, sensory change and focal weakness.   All other systems reviewed and are negative.    Allergies   Allergen Reactions   • Cefdinir Shortness of Breath and Itching     Tolerated 1/18/17  Tolerates ceftriaxone    • Depakote [Divalproex Sodium] Unspecified     Muscle spasms/muscle pain and weakness     • Doxycycline Anaphylaxis and Vomiting     RXN=unknown   • Abilify Unspecified     Headaches/muscle twitching     • Amitriptyline Unspecified     Headaches     • Amoxicillin Rash     Pt states \"I get a rash\".     • Ciprofloxacin Rash     Pt states \"I get a rash\".     • Clindamycin Nausea     Even with food     • Ees [Erythromycin] Vomiting and Nausea   • Flagyl [Metronidazole Hcl] Unspecified     \"eye problems\"     • Flomax [Tamsulosin Hydrochloride] Swelling   • Metformin Unspecified     Increased lactic acid      • Sulfa Drugs Hives and Rash     RXN=since childhood  PATIENT DID FINE WITH BACTRIM X 2 COURSES 10/2018   • Tape Rash     Tears skin off   coban with Tegaderm tape ok  RXN=ongoing   • Vancomycin Itching     Pt becomes flushed in face and chest.   RXN=7/10/16   • Wound Dressing " "Adhesive Hives     By pt report   • Cephalexin [Keflex] Rash     Pt states she gets a rash with this medication  Tolerates ceftriaxone   • Levofloxacin Unspecified     Leg muscle cramps   • Metronidazole Rash     .   • Valproic Acid Rash     .        Objective:   /74 (BP Location: Left arm, Patient Position: Sitting, BP Cuff Size: Large adult)   Pulse 86   Temp 37.2 °C (99 °F) (Temporal)   Resp 16   Ht 1.651 m (5' 5\")   Wt 122.5 kg (270 lb)   SpO2 94%   BMI 44.93 kg/m²   Physical Exam   Constitutional: She is oriented to person, place, and time. She appears well-developed and well-nourished.   HENT:   Head: Normocephalic and atraumatic.   Right Ear: Tympanic membrane, external ear and ear canal normal.   Left Ear: Tympanic membrane, external ear and ear canal normal.   Nose: Nose normal.   Mouth/Throat: Uvula is midline, oropharynx is clear and moist and mucous membranes are normal. No oropharyngeal exudate.   Eyes: Pupils are equal, round, and reactive to light. Conjunctivae and EOM are normal.   Neck: Normal range of motion. Neck supple.   Cardiovascular: Normal rate, regular rhythm and normal heart sounds.  Exam reveals no gallop and no friction rub.    No murmur heard.  Pulses:       Radial pulses are 2+ on the right side, and 2+ on the left side.   Brisk capillary refill   Pulmonary/Chest: Effort normal and breath sounds normal. No respiratory distress. She has no wheezes. She has no rales.   Abdominal: Soft. Bowel sounds are normal. She exhibits no distension and no mass. There is no tenderness. There is no rebound and no guarding.   Musculoskeletal: She exhibits no edema or deformity.        Right wrist: She exhibits decreased range of motion, tenderness, bony tenderness and swelling. She exhibits no effusion, no crepitus, no deformity and no laceration.   Lymphadenopathy:        Head (right side): No submental, no submandibular and no tonsillar adenopathy present.        Head (left side): No " submental, no submandibular and no tonsillar adenopathy present.     She has no cervical adenopathy.        Right: No supraclavicular adenopathy present.        Left: No supraclavicular adenopathy present.   Neurological: She is alert and oriented to person, place, and time. She has normal strength. No cranial nerve deficit or sensory deficit. Coordination normal.   Skin: Skin is warm and dry. No rash noted.   Psychiatric: She has a normal mood and affect. Judgment normal.   Vitals reviewed.          Assessment/Plan:   Assessment    1. Sprain of right wrist, initial encounter  - DX-WRIST-COMPLETE 3+ RIGHT; Future    2. Fall, initial encounter  - DX-WRIST-COMPLETE 3+ RIGHT; Future    X-ray is obtained, read by radiologist and reviewed by myself.  X-rays without fracture.  Patient has a wrist sprain.  She is placed in a cockup wrist splint.  Recommend ice and elevation.  Patient may use over-the-counter ibuprofen as needed for pain.  If not improving in 10 days recommend follow-up for possible repeat imaging to rule out occult fracture.      Differential diagnosis, natural history, supportive care, and indications for immediate follow-up discussed.    Please note that this note was created using voice recognition speech to text software. Every effort has been made to correct obvious errors.  However, I expect there are errors of grammar and possibly context that were not discovered prior to finalizing the note

## 2019-03-29 NOTE — PATIENT INSTRUCTIONS
Wrist Sprain, Adult  A wrist sprain is a stretch or tear in the strong, fibrous tissues (ligaments) that connect your wrist bones. There are three types of wrist sprains:  · Grade 1. In this type of sprain, the ligament is stretched more than normal.  · Grade 2. In this type of sprain, the ligament is partially torn. You may be able to move your wrist, but not very much.  · Grade 3. In this type of sprain, the ligament or muscle is completely torn. You may find it difficult or extremely painful to move your wrist even a little.  What are the causes?  A wrist sprain can be caused by using the wrist too much during sports, exercise, or at work. It can also happen with a fall or during an accident.  What increases the risk?  This condition is more likely to occur in people:  · With a previous wrist or arm injury.  · With poor wrist strength and flexibility.  · Who play contact sports, such as football or soccer.  · Who play sports that may result in a fall, such as skateboarding, biking, skiing, or snowboarding.  · Who do not exercise regularly.  · Who use exercise equipment that does not fit well.  What are the signs or symptoms?  Symptoms of this condition include:  · Pain in the wrist, arm, or hand.  · Swelling or bruised skin near the wrist, hand, or arm. The skin may look yellow or kind of blue.  · Stiffness or trouble moving the hand.  · Hearing a pop or feeling a tear at the time of the injury.  · A warm feeling in the skin around the wrist.  How is this diagnosed?  This condition is diagnosed with a physical exam. Sometimes an X-ray is taken to make sure a bone did not break. If your health care provider thinks that you tore a ligament, he or she may order an MRI of your wrist.  How is this treated?  This condition is treated by resting and applying ice to your wrist. Additional treatment may include:  · Medicine for pain and inflammation.  · A splint to keep your wrist still (immobilized).  · Exercises to  strengthen and stretch your wrist.  · Surgery. This may be done if the ligament is completely torn.  Follow these instructions at home:  If you have a splint:  · Do not put pressure on any part of the splint until it is fully hardened. This may take several hours.  · Wear the splint as told by your health care provider. Remove it only as told by your health care provider.  · Loosen the splint if your fingers tingle, become numb, or turn cold and blue.  · If your splint is not waterproof:  ¨ Do not let it get wet.  ¨ Cover it with a watertight covering when you take a bath or a shower.  · Keep the splint clean.  Managing pain, stiffness, and swelling  · If directed, put ice on the injured area.  ¨ If you have a removable splint, remove it as told by your health care provider.  ¨ Put ice in a plastic bag.  ¨ Place a towel between your skin and the bag or between the splint and the bag.  ¨ Leave the ice on for 20 minutes, 2-3 times per day.  · Move your fingers often to avoid stiffness and to lessen swelling.  · Raise (elevate) the injured area above the level of your heart while you are sitting or lying down.  Activity  · Rest your wrist. Do not do things that cause pain.  · Return to your normal activities as told by your health care provider. Ask your health care provider what activities are safe for you.  · Do exercises as told by your health care provider.  General instructions  · Take over-the-counter and prescription medicines only as told by your health care provider.  · Do not use any products that contain nicotine or tobacco, such as cigarettes and e-cigarettes. These can delay healing. If you need help quitting, ask your health care provider.  · Ask your health care provider when it is safe to drive if you have a splint.  · Keep all follow-up visits as told by your health care provider. This is important.  Contact a health care provider if:  · Your pain, bruising, or swelling gets worse.  · Your skin becomes  red, gets a rash, or has open sores.  · Your pain does not get better or it gets worse.  Get help right away if:  · You have a new or sudden sharp pain in the hand, arm, or wrist.  · You have tingling or numbness in your hand.  · Your fingers turn white, very red, or cold and blue.  · You cannot move your fingers.  This information is not intended to replace advice given to you by your health care provider. Make sure you discuss any questions you have with your health care provider.  Document Released: 08/21/2015 Document Revised: 07/15/2017 Document Reviewed: 07/06/2017  [employer] Interactive Patient Education © 2017 [employer]

## 2019-04-01 RX ORDER — TRAZODONE HYDROCHLORIDE 100 MG/1
TABLET ORAL
Qty: 90 TAB | Refills: 2 | Status: SHIPPED | OUTPATIENT
Start: 2019-04-01 | End: 2019-09-25

## 2019-04-04 ENCOUNTER — OFFICE VISIT (OUTPATIENT)
Dept: BEHAVIORAL HEALTH | Facility: CLINIC | Age: 29
End: 2019-04-04
Payer: MEDICARE

## 2019-04-04 VITALS
WEIGHT: 269 LBS | DIASTOLIC BLOOD PRESSURE: 68 MMHG | BODY MASS INDEX: 44.82 KG/M2 | RESPIRATION RATE: 16 BRPM | HEIGHT: 65 IN | HEART RATE: 82 BPM | SYSTOLIC BLOOD PRESSURE: 124 MMHG

## 2019-04-04 DIAGNOSIS — F25.1 SCHIZOAFFECTIVE DISORDER, DEPRESSIVE TYPE (HCC): ICD-10-CM

## 2019-04-04 DIAGNOSIS — G47.00 INSOMNIA, UNSPECIFIED TYPE: ICD-10-CM

## 2019-04-04 DIAGNOSIS — F60.9 PERSONALITY DISORDER (HCC): ICD-10-CM

## 2019-04-04 PROCEDURE — 99213 OFFICE O/P EST LOW 20 MIN: CPT | Performed by: PSYCHIATRY & NEUROLOGY

## 2019-04-04 PROCEDURE — 90833 PSYTX W PT W E/M 30 MIN: CPT | Performed by: PSYCHIATRY & NEUROLOGY

## 2019-04-04 RX ORDER — FLUOXETINE 10 MG/1
CAPSULE ORAL
Qty: 30 CAP | Refills: 2 | Status: ON HOLD | OUTPATIENT
Start: 2019-04-04 | End: 2019-07-03

## 2019-04-04 ASSESSMENT — PATIENT HEALTH QUESTIONNAIRE - PHQ9
5. POOR APPETITE OR OVEREATING: 1 - SEVERAL DAYS
CLINICAL INTERPRETATION OF PHQ2 SCORE: 2
SUM OF ALL RESPONSES TO PHQ QUESTIONS 1-9: 7

## 2019-04-04 NOTE — BH THERAPY
[facility]  PSYCHIATRIC FOLLOW-UP NOTE    Name: Kristin Balderrama  MRN: [id number]  : 1990  Age: 28 y.o.  Date of assessment: 2019  PCP: Torres Brody M.D.  Persons in attendance: Patient  Total face-to-face time: 30 minutes    REASON FOR VISIT/CHIEF COMPLAINT (as stated by Patient):  Kristin Balderrama is a 28 y.o., White female, attending follow-up appointment for   Chief Complaint   Patient presents with   • Medication Management     The patient is seen today for follow up on her depression, anxiety, and insomnia.   .      HISTORY OF PRESENT ILLNESS:    This is a 27 yo female, single, lives with grand mother, seen today for follow up after six weeks.  The patient is stressed out because her aunt is going for abdominal surgery and grand mother supposed to take care of reinier. Reinier is 56 yo person double amputee and on dialysis three times a week. The patient reported her aunt can not do any thing for six weeks for her . The patient had a fall and she hurt her right arm and she is in pain all the time. The patient has been sleeping well on trazodone and her psychosis improved on two geodon. The patient reported that her anxiety and depression improved.     PSYCHOSOCIAL CHANGES SINCE PREVIOUS CONTACT:  The patient reported that her mood has been stable and her anxiety and sleep improved.    RESPONSE TO TREATMENT:  She is taking geodon 80 mg two at bed time, trazodone 100 mh one at bed time, fluoxetine 10 mg one a day, and buspar 5 mg  Bid.    MEDICATION SIDE EFFECTS:  Weight.    REVIEW OF SYSTEMS:        Constitutional positive - obesity   Eyes negative   Ears/Nose/Mouth/Throat negative   Cardiovascular positive - hypertension, sinus tachy   Respiratory positive - obstructive sleep apnea.   Gastrointestinal negative   Genitourinary positive - ploycystic ovarian syndrome.   Muscular negative   Integumentary positive - chronic inflammatory arthritis.   Neurological positive -  "neuropathy   Endocrine positive - diabetes, hypothyroidism.   Hematologic/Lymphatic negative       PSYCHIATRIC EXAMINATION/MENTAL STATUS  /68   Pulse 82   Resp 16   Ht 1.651 m (5' 5\")   Wt 122 kg (269 lb)   BMI 44.76 kg/m²   Participation: Active verbal participation and Attentive  Grooming:Casual  Orientation: Alert and Fully Oriented  Eye contact: Good  Behavior:Calm   Mood: Anxious  Affect: Anxious  Thought process: Logical and Goal-directed  Thought content:  Preoccupation, Rumination and Obsessions  Speech: Rate within normal limits and Volume within normal limits  Perception:  Within normal limits  Memory:  No gross evidence of memory deficits  Insight: Good  Judgment: Good  Family/couple interaction observations:   Other:    Current risk:    Suicide: Low   Homicide: Low   Self-harm: Low  Relapse: Low  Other:   Crisis Safety Plan reviewed?Yes  If evidence of imminent risk is present, intervention/plan:    Medical Records/Labs/Diagnostic Tests Reviewed: yes.    Medical Records/Labs/Diagnostic Tests Ordered: none.    DIAGNOSTIC IMPRESSION(S):  1. Schizoaffective disorder, depressive type (HCC)    2. Personality disorder (HCC)    3. Insomnia, unspecified type           ASSESSMENT AND PLAN:  1. Schizoaffective disorder  2. Personality disorder.  Geodon 80 mg two after dinner.  Fluoxetine 10 mg one a day.  Buspar 5 mg bid    3. Insomnia.  Trazodone  100 mg one ad mend time    4. Obesity.  Diet and exercise as tolerated.    5. Follow up in four weeks.     17 minutes were spent in psychotherapy.  (If greater than 16 minutes, add 87360 code)   Topics addressed in psychotherapy include:  Psycho education and cognitive clarifications.  Educated her about emotional and behavior regulation.  We discussed her negative automatic thoughts and helped her to process it.  Encouraged her to keep a daily routine and good sleep cycle.  Ronny Burnette M.D.                   "

## 2019-04-08 RX ORDER — SODIUM BICARBONATE 650 MG/1
650 TABLET ORAL 2 TIMES DAILY
Qty: 360 TAB | Refills: 3 | Status: SHIPPED | OUTPATIENT
Start: 2019-04-08 | End: 2019-06-29 | Stop reason: CLARIF

## 2019-04-09 ENCOUNTER — OFFICE VISIT (OUTPATIENT)
Dept: MEDICAL GROUP | Facility: MEDICAL CENTER | Age: 29
End: 2019-04-09
Payer: MEDICARE

## 2019-04-09 ENCOUNTER — HOSPITAL ENCOUNTER (OUTPATIENT)
Dept: LAB | Facility: MEDICAL CENTER | Age: 29
End: 2019-04-09
Attending: INTERNAL MEDICINE
Payer: MEDICARE

## 2019-04-09 ENCOUNTER — HOSPITAL ENCOUNTER (OUTPATIENT)
Dept: RADIOLOGY | Facility: MEDICAL CENTER | Age: 29
End: 2019-04-09
Attending: INTERNAL MEDICINE
Payer: MEDICARE

## 2019-04-09 VITALS
OXYGEN SATURATION: 97 % | SYSTOLIC BLOOD PRESSURE: 116 MMHG | HEART RATE: 68 BPM | TEMPERATURE: 97.4 F | DIASTOLIC BLOOD PRESSURE: 80 MMHG

## 2019-04-09 DIAGNOSIS — R10.9 FLANK PAIN: ICD-10-CM

## 2019-04-09 DIAGNOSIS — M79.672 LEFT FOOT PAIN: ICD-10-CM

## 2019-04-09 DIAGNOSIS — E03.9 ACQUIRED HYPOTHYROIDISM: ICD-10-CM

## 2019-04-09 DIAGNOSIS — E11.9 CONTROLLED TYPE 2 DIABETES MELLITUS WITHOUT COMPLICATION, WITHOUT LONG-TERM CURRENT USE OF INSULIN (HCC): ICD-10-CM

## 2019-04-09 LAB
EST. AVERAGE GLUCOSE BLD GHB EST-MCNC: 128 MG/DL
HBA1C MFR BLD: 6.1 % (ref 0–5.6)
TSH SERPL DL<=0.005 MIU/L-ACNC: 3.8 UIU/ML (ref 0.38–5.33)

## 2019-04-09 PROCEDURE — 84443 ASSAY THYROID STIM HORMONE: CPT

## 2019-04-09 PROCEDURE — 83036 HEMOGLOBIN GLYCOSYLATED A1C: CPT | Mod: GA

## 2019-04-09 PROCEDURE — 36415 COLL VENOUS BLD VENIPUNCTURE: CPT

## 2019-04-09 PROCEDURE — 73630 X-RAY EXAM OF FOOT: CPT | Mod: LT

## 2019-04-09 PROCEDURE — 99214 OFFICE O/P EST MOD 30 MIN: CPT | Performed by: INTERNAL MEDICINE

## 2019-04-09 RX ORDER — METHOCARBAMOL 750 MG/1
750 TABLET, FILM COATED ORAL EVERY 4 HOURS
Status: ON HOLD | COMMUNITY
End: 2019-12-31

## 2019-04-09 NOTE — PROGRESS NOTES
CC: Flank pain, foot pain, due for blood work.    HPI:   Kristin presents today with the following.    1. Flank pain  Complaining of left-sided flank pain for the last 2 weeks.  She reports it is painful with twisting and turning no changes to bowel or bladder.  No difficulty with breathing.  The pain is exactly located where her arm rest is positioned when she sits in her wheelchair.  She does not recall slumping over following over onto it.    2. Left foot pain  She is having left foot pain radiating from the heel toward her toes.  She reports it is painful to try and walk directly on it.  She is wearing slippers most of the time.  She reports she has shoe inserts but have never seen her wearing her actual tennis shoes.  Pain can be 6 out of 10 in intensity    3. Controlled type 2 diabetes mellitus without complication, without long-term current use of insulin ([facility])  She is coming due for recheck of blood sugars.  Denying any hypoglycemia not checking her sugars    4. Acquired hypothyroidism  Also coming due for recheck of thyroid no hair or skin changes.      Patient Active Problem List    Diagnosis Date Noted   • Left leg weakness 07/14/2018     Priority: High   • Controlled type 2 diabetes mellitus without complication, without long-term current use of insulin ([facility]) 04/26/2017     Priority: High   • Sinus tachycardia 10/31/2013     Priority: High   • Chronic inflammatory arthritis 05/23/2016     Priority: Medium   • Morbid obesity with BMI of 45.0-49.9, adult ([facility]) 10/24/2017     Priority: Low   • Depression 10/28/2016     Priority: Low   • Schizophrenia ([facility]) 10/27/2016     Priority: Low   • Psychosis, schizophrenia, simple ([facility]) 09/29/2016     Priority: Low     Class: Chronic   • Acquired hypothyroidism 11/23/2015     Priority: Low   • PCOS (polycystic ovarian syndrome) 11/23/2015     Priority: Low   • Progressive focal motor weakness 06/28/2015     Priority: Low   • Fatty liver disease, nonalcoholic  01/19/2015     Priority: Low   • Anxiety 12/16/2014     Priority: Low   • Knee pain, right 02/13/2014     Priority: Low   • Neurogenic bladder 04/02/2011     Priority: Low   • Chronic UTI 09/18/2010     Priority: Low   • Borderline personality disorder in adult ([facility]) 09/18/2010     Priority: Low   • Asymptomatic bacteriuria 10/26/2018   • Palpitations 10/01/2018   • Chronic respiratory failure with hypoxia, on home oxygen therapy ([facility]) 08/08/2018   • Transaminitis 08/08/2018   • TONYA (obstructive sleep apnea) 01/09/2018   • Functional diarrhea 01/05/2018   • Breast wound 11/06/2017   • Intractable episodic cluster headache 09/14/2017   • Rash 06/09/2017   • Hashimoto's encephalopathy 05/17/2017   • Fall at home 05/13/2017   • On home oxygen therapy 04/15/2017   • Weakness of right upper extremity 02/23/2017   • Chronic suprapubic catheter ([facility]) 02/16/2017   • Hypovitaminosis D 11/29/2016   • Chronic pain syndrome 10/27/2016   • Bowel and bladder incontinence 10/27/2016   • Galactorrhea 07/22/2016   • Elevated sedimentation rate 06/27/2016   • Weakness of both lower extremities 06/22/2016   • Vitamin D deficiency 05/21/2016   • Morbidly obese ([facility]) 03/07/2016   • Scoliosis 03/07/2016   • GERD (gastroesophageal reflux disease) 03/07/2016   • Peripheral neuropathy (CMS-HCC) 03/06/2016   • H/O prior ablation treatment 02/10/2016       Current Outpatient Prescriptions   Medication Sig Dispense Refill   • methocarbamol (ROBAXIN) 750 MG Tab Take 750 mg by mouth 3 times a day.     • Diclofenac Sodium (VOLTAREN) 1 % Gel Apply 1 Inch to skin as directed 4 times a day. 5 Tube 6   • sodium bicarbonate (SODIUM BICARBONATE) 650 MG Tab Take 1 Tab by mouth 2 times a day. 360 Tab 3   • FLUoxetine (PROZAC) 10 MG Cap TAKE ONE CAPSULE BY MOUTH ONCE A DAY 30 Cap 2   • traZODone (DESYREL) 100 MG Tab TAKE  ONE TABLET BY MOUTH NIGHTLY AT BEDTIME 90 Tab 2   • Diphenhydramine-APAP, sleep, (TYLENOL PM EXTRA STRENGTH PO) Take  by mouth.     •  nitrofurantoin monohyd macro (MACROBID) 100 MG Cap Take 1 Cap by mouth 2 times a day. 20 Cap 1   • nystatin (MYCOSTATIN) 122261 UNIT/GM Cream topical cream Apply 0.0001 g to affected area(s) 2 times a day. 1 Tube 3   • cefdinir (OMNICEF) 300 MG Cap      • DEBLITANE 0.35 MG tablet      • LYRICA 300 MG capsule      • ondansetron (ZOFRAN ODT) 4 MG TABLET DISPERSIBLE Take 1 Tab by mouth every 8 hours as needed. 10 Tab 6   • albuterol 108 (90 Base) MCG/ACT Aero Soln inhalation aerosol Inhale 2 Puffs by mouth every 6 hours as needed. 8.5 g 11   • ziprasidone (GEODON) 80 MG Cap TAKE TWO CAPSULES BY MOUTH DAILY AT BEDTIME 60 Cap 5   • hydrocortisone (ANUSOL-HC) 25 MG Suppos Insert 1 Suppository in rectum every 12 hours. 10 Suppository 0   • Ivabradine HCl 7.5 MG Tab Take 7.5 mg by mouth 2 Times a Day. 60 Tab 2   • dicyclomine (BENTYL) 10 MG Cap Take 1 Cap by mouth every 6 hours as needed (abdominal cramping). 20 Cap 1   • lactulose 10 GM/15ML Solution Take 10 g by mouth 2 times a day as needed.     • vitamin D, Ergocalciferol, (DRISDOL) 61057 units Cap capsule Take 50,000 Units by mouth every 7 days.     • omeprazole (PRILOSEC) 20 MG delayed-release capsule Take 1 Cap by mouth every day. 90 Cap 3   • busPIRone (BUSPAR) 5 MG tablet Take 1 Tab by mouth 2 times a day. 60 Tab 5   • lidocaine (LIDODERM) 5 % Patch Apply 1 Patch to skin as directed every 24 hours. 10 Patch 2   • baclofen (LIORESAL) 10 MG Tab TAKE ONE TABLET BY MOUTH THREE TIMES DAILY 90 Tab 4   • cholestyramine (QUESTRAN,PREVALITE) 4 GM Pack Take 4 g by mouth every morning as needed.     • melatonin 3 MG Tab Take 6 mg by mouth every bedtime.     • furosemide (LASIX) 40 MG Tab Take 80 mg by mouth every day.     • cyanocobalamin ([employer] VITAMIN B-12) 500 MCG tablet Take 500 mcg by mouth every day.     • aspirin EC (ECOTRIN) 81 MG Tablet Delayed Response Take 1 Tab by mouth every day. 30 Tab 6   • methocarbamol (ROBAXIN) 500 MG Tab Take 500 mg by mouth 3 times a day.        No current facility-administered medications for this visit.          Allergies as of 04/09/2019 - Reviewed 04/09/2019   Allergen Reaction Noted   • Cefdinir Shortness of Breath and Itching 03/01/2016   • Depakote [divalproex sodium] Unspecified 06/14/2010   • Doxycycline Anaphylaxis and Vomiting 08/15/2012   • Abilify Unspecified 01/17/2013   • Amitriptyline Unspecified 10/31/2013   • Amoxicillin Rash 09/18/2010   • Ciprofloxacin Rash 12/17/2009   • Clindamycin Nausea 02/02/2011   • Ees [erythromycin] Vomiting and Nausea 08/28/2010   • Flagyl [metronidazole hcl] Unspecified 03/31/2011   • Flomax [tamsulosin hydrochloride] Swelling 09/24/2009   • Metformin Unspecified 07/23/2013   • Sulfa drugs Hives and Rash 09/18/2010   • Tape Rash 08/15/2012   • Vancomycin Itching 07/10/2016   • Wound dressing adhesive Hives 01/12/2018   • Cephalexin [keflex] Rash 01/01/2017   • Levofloxacin Unspecified 10/27/2016   • Metronidazole Rash 03/30/2017   • Valproic acid Rash 03/30/2017        ROS: Denies Chest pain, SOB, LE edema.    /80 (BP Location: Left arm, Patient Position: Sitting)   Pulse 68   Temp 36.3 °C (97.4 °F)   SpO2 97%     Physical Exam:  Gen:         Alert and oriented, No apparent distress.  Neck:        No Lymphadenopathy or Bruits.  Lungs:     Clear to auscultation bilaterally  CV:          Regular rate and rhythm. No murmurs, rubs or gallops.               Ext:          No clubbing, cyanosis, edema.      Assessment and Plan.   28 y.o. female with the following issues.    1. Flank pain  Musculoskeletal seem to be related to positioning of her chair have made recommendations.  She has any difficulty breathing she will follow-up.    2. Left foot pain  Written for x-rays of the foot suspect plantar fasciitis placing on topical anti-inflammatory.  - DX-FOOT-COMPLETE 3+ LEFT; Future  - Diclofenac Sodium (VOLTAREN) 1 % Gel; Apply 1 Inch to skin as directed 4 times a day.  Dispense: 5 Tube; Refill: 6    3.  Controlled type 2 diabetes mellitus without complication, without long-term current use of insulin (HCC)  Recheck blood sugars.  - HEMOGLOBIN A1C; Future    4. Acquired hypothyroidism  Recheck thyroid  - TSH; Future

## 2019-04-10 ENCOUNTER — OFFICE VISIT (OUTPATIENT)
Dept: CARDIOLOGY | Facility: MEDICAL CENTER | Age: 29
End: 2019-04-10
Payer: MEDICARE

## 2019-04-10 VITALS
OXYGEN SATURATION: 94 % | RESPIRATION RATE: 20 BRPM | DIASTOLIC BLOOD PRESSURE: 66 MMHG | HEART RATE: 88 BPM | BODY MASS INDEX: 44.48 KG/M2 | HEIGHT: 65 IN | WEIGHT: 267 LBS | SYSTOLIC BLOOD PRESSURE: 114 MMHG

## 2019-04-10 DIAGNOSIS — I47.11 INAPPROPRIATE SINUS TACHYCARDIA: ICD-10-CM

## 2019-04-10 PROCEDURE — 99214 OFFICE O/P EST MOD 30 MIN: CPT | Performed by: INTERNAL MEDICINE

## 2019-04-10 ASSESSMENT — ENCOUNTER SYMPTOMS
PALPITATIONS: 0
COUGH: 0
DIZZINESS: 0
SHORTNESS OF BREATH: 0

## 2019-04-10 NOTE — PROGRESS NOTES
"Chief Complaint   Patient presents with   • Tachycardia       Subjective:   Kristin Balderrama is a 27 y.o. female who presents today for sinus tachycardia, ablation 2016 with post ablation PAF on chronic Corlonar therapy.    Last seen on 10/1/2018.    Since 10/1/2018 the patient was seen in 1/2019 for elevated heart rate and blood pressure which spontaneously improved.  Seen by Dr. Abhishek Andino, [facility] in the ER and had her increase Ivabradine 7.5 mg twice daily which she tolerates.    Since 2/23/2018 appointment the patient said no cardiac symptoms.  Feels that Corlonar therapy has been quite effective.  On chronic oxygen therapy.    Past Medical History:   Diagnosis Date   • Abdominal pain    • Anginal syndrome     random chest pain especially with tachycardia   • Apnea, sleep    • Arrhythmia     \"sinus tachycardia\", cariologist, Dr. Kumar; ablation 2/2016   • Arthritis     osteo   • ASTHMA     when around smoke   • Atrial fibrillation (HCC)    • Back pain    • Borderline personality disorder (HCC)    • Breath shortness     with tachycardia   • Cardiac arrhythmia    • Chickenpox    • Chronic UTI 9/18/2010   • Cough    • Daytime sleepiness    • Depression    • Diabetes (HCC)    • Diarrhea    • Disorder of thyroid    • Fall    • Fatigue    • Frequent headaches    • Gasping for breath    • Gynecological disorder     PCOS   • Headache(784.0)    • Heart burn    • History of falling    • Hypertension    • Migraine    • 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HCC)    • 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HCC)    • Nausea    • Obesity    • Pain 08-15-12    back, 7/10   • Painful joint    • PCOS (polycystic ovarian syndrome)    • Pneumonia 2012   • Psychosis (HCC)    • Renal disorder     \"kidney disease, stage 1\" nephrologist, Dr. 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   • Urinary bladder disorder     Suprapubic cath   • Urinary incontinence    • Weakness    • Wears glasses      Past Surgical History:   Procedure Laterality Date   • MUSCLE BIOPSY Right 1/26/2017    Procedure: MUSCLE BIOPSY - THIGH;  Surgeon: Isidro Vigil M.D.;  Location: SURGERY [facility];  Service:    • GASTROSCOPY WITH BALLOON DILATATION N/A 1/4/2017    Procedure: GASTROSCOPY WITH DILATATION;  Surgeon: Torres Vargas M.D.;  Location: SURGERY [facility];  Service:    • BOWEL STIMULATOR INSERTION  7/13/2016    Procedure: BOWEL STIMULATOR INSERTION FOR PERMANENT INTERSTIM SACRAL IMPLANT;  Surgeon: Joe Noyola M.D.;  Location: SURGERY [facility];  Service:    • RECOVERY  1/27/2016    Procedure: CATH LAB EP STUDY WITH SINUS NODE MODIFICATION LA;  Surgeon: Recoveryonly Surgery;  Location: SURGERY PRE-POST PROC UNIT [facility];  Service:    • OTHER CARDIAC SURGERY  1/2016    cardiac ablation   • NEURO DEST FACET L/S W/IG SNGL  4/21/2015    Performed by Reza Tabor at SURGERY [facility]   • LUMBAR FUSION ANTERIOR  8/21/2012    Performed by SUSIE SAWANT at SURGERY [facility]   • ALYSSA BY LAPAROSCOPY  8/29/2010    Performed by SHAYY JOHNS at SURGERY [facility]   • LAMINOTOMY     • OTHER ABDOMINAL SURGERY     • TONSILLECTOMY      tonsillectomy     Family History   Problem Relation Age of Onset   • Hypertension Mother    • Sleep Apnea Mother    • Heart Disease Mother    • Other Mother         hypothryod   • Hypertension Maternal Uncle    • Heart Disease Maternal Grandmother    • Hypertension Maternal Grandmother    • No Known Problems Sister    • Other Sister         Narcolepsy;fibromyalsia;bone;nerve   • Genitourinary () Sister         endometriosis     Social History     Social History   • Marital status: Single     Spouse name: N/A   • Number of children: N/A   • Years of education: N/A     Occupational History  "  • Not on file.     Social History Main Topics   • Smoking status: Never Smoker   • Smokeless tobacco: Never Used   • Alcohol use No   • Drug use: Yes     Frequency: 7.0 times per week     Types: Marijuana, Inhaled      Comment: Medicinal edible's   • Sexual activity: Not Currently     Birth control/ protection: Pill     Other Topics Concern   • Not on file     Social History Narrative    ** Merged History Encounter **          Allergies   Allergen Reactions   • Cefdinir Shortness of Breath and Itching     Tolerated 1/18/17  Tolerates ceftriaxone    • Depakote [Divalproex Sodium] Unspecified     Muscle spasms/muscle pain and weakness     • Doxycycline Anaphylaxis and Vomiting     RXN=unknown   • Abilify Unspecified     Headaches/muscle twitching     • Amitriptyline Unspecified     Headaches     • Amoxicillin Rash     Pt states \"I get a rash\".     • Ciprofloxacin Rash     Pt states \"I get a rash\".     • Clindamycin Nausea     Even with food     • Ees [Erythromycin] Vomiting and Nausea   • Flagyl [Metronidazole Hcl] Unspecified     \"eye problems\"     • Flomax [Tamsulosin Hydrochloride] Swelling   • Metformin Unspecified     Increased lactic acid      • Sulfa Drugs Hives and Rash     RXN=since childhood  PATIENT DID FINE WITH BACTRIM X 2 COURSES 10/2018   • Tape Rash     Tears skin off   coban with Tegaderm tape ok  RXN=ongoing   • Vancomycin Itching     Pt becomes flushed in face and chest.   RXN=7/10/16   • Wound Dressing Adhesive Hives     By pt report   • Cephalexin [Keflex] Rash     Pt states she gets a rash with this medication  Tolerates ceftriaxone   • Levofloxacin Unspecified     Leg muscle cramps   • Metronidazole Rash     .   • Valproic Acid Rash     .     Outpatient Encounter Prescriptions as of 4/10/2019   Medication Sig Dispense Refill   • Ivabradine HCl 7.5 MG Tab Take 7.5 mg by mouth 2 Times a Day. 180 Tab 3   • methocarbamol (ROBAXIN) 750 MG Tab Take 750 mg by mouth 3 times a day.     • Diclofenac " Sodium (VOLTAREN) 1 % Gel Apply 1 Inch to skin as directed 4 times a day. 5 Tube 6   • sodium bicarbonate (SODIUM BICARBONATE) 650 MG Tab Take 1 Tab by mouth 2 times a day. 360 Tab 3   • FLUoxetine (PROZAC) 10 MG Cap TAKE ONE CAPSULE BY MOUTH ONCE A DAY 30 Cap 2   • traZODone (DESYREL) 100 MG Tab TAKE  ONE TABLET BY MOUTH NIGHTLY AT BEDTIME 90 Tab 2   • Diphenhydramine-APAP, sleep, (TYLENOL PM EXTRA STRENGTH PO) Take  by mouth.     • nystatin (MYCOSTATIN) 851304 UNIT/GM Cream topical cream Apply 0.0001 g to affected area(s) 2 times a day. 1 Tube 3   • DEBLITANE 0.35 MG tablet      • LYRICA 300 MG capsule 2 times a day.     • ondansetron (ZOFRAN ODT) 4 MG TABLET DISPERSIBLE Take 1 Tab by mouth every 8 hours as needed. 10 Tab 6   • albuterol 108 (90 Base) MCG/ACT Aero Soln inhalation aerosol Inhale 2 Puffs by mouth every 6 hours as needed. 8.5 g 11   • ziprasidone (GEODON) 80 MG Cap TAKE TWO CAPSULES BY MOUTH DAILY AT BEDTIME 60 Cap 5   • lactulose 10 GM/15ML Solution Take 10 g by mouth 2 times a day as needed.     • vitamin D, Ergocalciferol, (DRISDOL) 39185 units Cap capsule Take 50,000 Units by mouth every 7 days.     • omeprazole (PRILOSEC) 20 MG delayed-release capsule Take 1 Cap by mouth every day. 90 Cap 3   • busPIRone (BUSPAR) 5 MG tablet Take 1 Tab by mouth 2 times a day. 60 Tab 5   • lidocaine (LIDODERM) 5 % Patch Apply 1 Patch to skin as directed every 24 hours. 10 Patch 2   • cholestyramine (QUESTRAN,PREVALITE) 4 GM Pack Take 4 g by mouth every morning as needed.     • Melatonin 10 MG Cap Take 10 mg by mouth every bedtime.     • furosemide (LASIX) 40 MG Tab Take 80 mg by mouth every day.     • aspirin EC (ECOTRIN) 81 MG Tablet Delayed Response Take 1 Tab by mouth every day. 30 Tab 6   • nitrofurantoin monohyd macro (MACROBID) 100 MG Cap Take 1 Cap by mouth 2 times a day. (Patient not taking: Reported on 4/10/2019) 20 Cap 1   • cefdinir (OMNICEF) 300 MG Cap      • methocarbamol (ROBAXIN) 500 MG Tab Take  "500 mg by mouth 3 times a day.     • hydrocortisone (ANUSOL-HC) 25 MG Suppos Insert 1 Suppository in rectum every 12 hours. (Patient not taking: Reported on 4/10/2019) 10 Suppository 0   • [DISCONTINUED] Ivabradine HCl 7.5 MG Tab Take 7.5 mg by mouth 2 Times a Day. 60 Tab 2   • dicyclomine (BENTYL) 10 MG Cap Take 1 Cap by mouth every 6 hours as needed (abdominal cramping). (Patient not taking: Reported on 4/10/2019) 20 Cap 1   • baclofen (LIORESAL) 10 MG Tab TAKE ONE TABLET BY MOUTH THREE TIMES DAILY (Patient not taking: Reported on 4/10/2019) 90 Tab 4   • cyanocobalamin ([employer] VITAMIN B-12) 500 MCG tablet Take 500 mcg by mouth every day.       No facility-administered encounter medications on file as of 4/10/2019.      Review of Systems   Respiratory: Negative for cough and shortness of breath.    Cardiovascular: Negative for chest pain and palpitations.   Neurological: Negative for dizziness.        Objective:   /66 (BP Location: Left arm, Patient Position: Sitting, BP Cuff Size: Large adult)   Pulse 88   Resp 20   Ht 1.651 m (5' 5\")   Wt 121.1 kg (267 lb)   SpO2 94%   BMI 44.43 kg/m²     Physical Exam   Constitutional: She is oriented to person, place, and time. She appears well-developed and well-nourished.   Nasal cannula.  Wheelchair.   Neck: No JVD present.   Cardiovascular: Normal rate, regular rhythm, normal heart sounds and intact distal pulses.    Pulmonary/Chest: Effort normal and breath sounds normal. No respiratory distress. She has no wheezes. She has no rales.   Abdominal:   Markedly protuberant.   Neurological: She is alert and oriented to person, place, and time.   Skin: Skin is warm and dry.   Psychiatric: She has a normal mood and affect. Her behavior is normal.     03/01/2017 ECHOCARDIOGRAM  Left ventricular ejection fraction 60%.  No valvular disease.    08/13/2018 EKG: Normal sinus rhythm, rate 83.  Reviewed by myself.    Assessment:     1. Inappropriate sinus tachycardia   "       Medical Decision Making:  Today's Assessment / Status / Plan:     Assessment  1.  Inappropriate sinus tachycardia.  2.  Ablation for IST, 2016.  3.  Thyroid disorder, on chronic supplementation.  4.  Sleep apnea on CPAP.  5.  Morbid obesity.    Recommendation Discussion  1.  The patient's current cardiac status is stable.  2.  Continue current cardiac therapy.  3.  Follow-up in 6 months.

## 2019-04-11 NOTE — PROGRESS NOTES
"SBAR Documentation: Situation, Background, Assessment, Recommendation     Situation    3/25/19 5734 Outgoing call to follow up on patient status. LVM.     3175 Patient returned phone call.    Background       PMH: DM2, obesity, multiple falls, chronic UTI   Assessment    Patient reports she is doing \"ok\" this month. Patient reports she had a fall resulting in a sprained wrist. Overall, frequency of falls has decreased in the last several months.     Patient has utilized community resources with the [employer] prescription to increase her carb controlled access to food. Encouraged patient to continue with changes she has made to continue to improve her health.    Recommendation Patient agreeable to discharge from [facility] RN services at this time, stating \"I'll call you if I need you.\"      Escalation Protocol: No Escalation Needed    "

## 2019-04-22 ENCOUNTER — HOSPITAL ENCOUNTER (OUTPATIENT)
Dept: LAB | Facility: MEDICAL CENTER | Age: 29
End: 2019-04-22
Attending: INTERNAL MEDICINE
Payer: MEDICARE

## 2019-04-22 LAB
ALBUMIN SERPL BCP-MCNC: 4.2 G/DL (ref 3.2–4.9)
APPEARANCE UR: CLEAR
BACTERIA #/AREA URNS HPF: NEGATIVE /HPF
BASOPHILS # BLD AUTO: 0.9 % (ref 0–1.8)
BASOPHILS # BLD: 0.05 K/UL (ref 0–0.12)
BILIRUB UR QL STRIP.AUTO: NEGATIVE
BUN SERPL-MCNC: 11 MG/DL (ref 8–22)
CALCIUM SERPL-MCNC: 9.3 MG/DL (ref 8.5–10.5)
CHLORIDE SERPL-SCNC: 108 MMOL/L (ref 96–112)
CO2 SERPL-SCNC: 20 MMOL/L (ref 20–33)
COLOR UR: YELLOW
CREAT SERPL-MCNC: 0.65 MG/DL (ref 0.5–1.4)
CREAT UR-MCNC: 132.5 MG/DL
EOSINOPHIL # BLD AUTO: 0.13 K/UL (ref 0–0.51)
EOSINOPHIL NFR BLD: 2.2 % (ref 0–6.9)
EPI CELLS #/AREA URNS HPF: ABNORMAL /HPF
ERYTHROCYTE [DISTWIDTH] IN BLOOD BY AUTOMATED COUNT: 40.9 FL (ref 35.9–50)
GLUCOSE SERPL-MCNC: 119 MG/DL (ref 65–99)
GLUCOSE UR STRIP.AUTO-MCNC: NEGATIVE MG/DL
HCT VFR BLD AUTO: 44.4 % (ref 37–47)
HGB BLD-MCNC: 14.7 G/DL (ref 12–16)
HYALINE CASTS #/AREA URNS LPF: ABNORMAL /LPF
IMM GRANULOCYTES # BLD AUTO: 0.02 K/UL (ref 0–0.11)
IMM GRANULOCYTES NFR BLD AUTO: 0.3 % (ref 0–0.9)
KETONES UR STRIP.AUTO-MCNC: NEGATIVE MG/DL
LEUKOCYTE ESTERASE UR QL STRIP.AUTO: ABNORMAL
LYMPHOCYTES # BLD AUTO: 2.1 K/UL (ref 1–4.8)
LYMPHOCYTES NFR BLD: 35.8 % (ref 22–41)
MCH RBC QN AUTO: 30.2 PG (ref 27–33)
MCHC RBC AUTO-ENTMCNC: 33.1 G/DL (ref 33.6–35)
MCV RBC AUTO: 91.4 FL (ref 81.4–97.8)
MICRO URNS: ABNORMAL
MONOCYTES # BLD AUTO: 0.32 K/UL (ref 0–0.85)
MONOCYTES NFR BLD AUTO: 5.5 % (ref 0–13.4)
NEUTROPHILS # BLD AUTO: 3.25 K/UL (ref 2–7.15)
NEUTROPHILS NFR BLD: 55.3 % (ref 44–72)
NITRITE UR QL STRIP.AUTO: NEGATIVE
NRBC # BLD AUTO: 0 K/UL
NRBC BLD-RTO: 0 /100 WBC
PH UR STRIP.AUTO: 5.5 [PH]
PHOSPHATE SERPL-MCNC: 2.7 MG/DL (ref 2.5–4.5)
PLATELET # BLD AUTO: 178 K/UL (ref 164–446)
PMV BLD AUTO: 12.5 FL (ref 9–12.9)
POTASSIUM SERPL-SCNC: 4.3 MMOL/L (ref 3.6–5.5)
PROT UR QL STRIP: NEGATIVE MG/DL
PROT UR-MCNC: 11.1 MG/DL (ref 0–15)
PROT/CREAT UR: 84 MG/G (ref 10–107)
RBC # BLD AUTO: 4.86 M/UL (ref 4.2–5.4)
RBC # URNS HPF: ABNORMAL /HPF
RBC UR QL AUTO: ABNORMAL
SODIUM SERPL-SCNC: 136 MMOL/L (ref 135–145)
SP GR UR STRIP.AUTO: 1.02
UROBILINOGEN UR STRIP.AUTO-MCNC: 0.2 MG/DL
WBC # BLD AUTO: 5.9 K/UL (ref 4.8–10.8)
WBC #/AREA URNS HPF: ABNORMAL /HPF

## 2019-04-22 PROCEDURE — 82306 VITAMIN D 25 HYDROXY: CPT | Mod: GA

## 2019-04-22 PROCEDURE — 82570 ASSAY OF URINE CREATININE: CPT

## 2019-04-22 PROCEDURE — 80069 RENAL FUNCTION PANEL: CPT

## 2019-04-22 PROCEDURE — 36415 COLL VENOUS BLD VENIPUNCTURE: CPT | Mod: GA

## 2019-04-22 PROCEDURE — 81001 URINALYSIS AUTO W/SCOPE: CPT

## 2019-04-22 PROCEDURE — 83970 ASSAY OF PARATHORMONE: CPT

## 2019-04-22 PROCEDURE — 84156 ASSAY OF PROTEIN URINE: CPT

## 2019-04-22 PROCEDURE — 85025 COMPLETE CBC W/AUTO DIFF WBC: CPT

## 2019-04-23 LAB
25(OH)D3 SERPL-MCNC: 15 NG/ML (ref 30–100)
PTH-INTACT SERPL-MCNC: 62.1 PG/ML (ref 14–72)

## 2019-04-24 ENCOUNTER — OFFICE VISIT (OUTPATIENT)
Dept: MEDICAL GROUP | Facility: MEDICAL CENTER | Age: 29
End: 2019-04-24
Payer: MEDICARE

## 2019-04-24 VITALS
DIASTOLIC BLOOD PRESSURE: 66 MMHG | SYSTOLIC BLOOD PRESSURE: 112 MMHG | TEMPERATURE: 97.7 F | HEART RATE: 69 BPM | OXYGEN SATURATION: 94 %

## 2019-04-24 DIAGNOSIS — M25.511 CHRONIC RIGHT SHOULDER PAIN: ICD-10-CM

## 2019-04-24 DIAGNOSIS — M25.531 RIGHT WRIST PAIN: ICD-10-CM

## 2019-04-24 DIAGNOSIS — G89.29 CHRONIC RIGHT SHOULDER PAIN: ICD-10-CM

## 2019-04-24 DIAGNOSIS — M72.2 PLANTAR FASCIITIS: ICD-10-CM

## 2019-04-24 PROCEDURE — 99214 OFFICE O/P EST MOD 30 MIN: CPT | Performed by: INTERNAL MEDICINE

## 2019-04-24 NOTE — PROGRESS NOTES
CC: Foot pain, shoulder pain, wrist pain.    HPI:   Kristin presents today with the following.    1. Plantar fasciitis  Has been seen by podiatry receiving injections that were somewhat helpful inserts and also written for physical therapy.  She would like to combine her shoulder with her feet therefore would like a referral from me.    2. Chronic right shoulder pain  Shoulder is popping and somewhat painful 3 out of 10 in intensity to particular with going overhead she does not have loss of range of motion.  She denies any acute injury.    3. Right wrist pain  She is complaining of right wrist pain 4 out of 10 intensity wearing a brace.  Currently not taking anything for it.  She did have x-rays several weeks ago showing no signs of fracture.  She reports pain is not improving      Patient Active Problem List    Diagnosis Date Noted   • Left leg weakness 07/14/2018     Priority: High   • Controlled type 2 diabetes mellitus without complication, without long-term current use of insulin ([facility]) 04/26/2017     Priority: High   • Sinus tachycardia 10/31/2013     Priority: High   • Chronic inflammatory arthritis 05/23/2016     Priority: Medium   • Morbid obesity with BMI of 45.0-49.9, adult ([facility]) 10/24/2017     Priority: Low   • Depression 10/28/2016     Priority: Low   • Schizophrenia ([facility]) 10/27/2016     Priority: Low   • Psychosis, schizophrenia, simple ([facility]) 09/29/2016     Priority: Low     Class: Chronic   • Acquired hypothyroidism 11/23/2015     Priority: Low   • PCOS (polycystic ovarian syndrome) 11/23/2015     Priority: Low   • Progressive focal motor weakness 06/28/2015     Priority: Low   • Fatty liver disease, nonalcoholic 01/19/2015     Priority: Low   • Anxiety 12/16/2014     Priority: Low   • Knee pain, right 02/13/2014     Priority: Low   • Neurogenic bladder 04/02/2011     Priority: Low   • Chronic UTI 09/18/2010     Priority: Low   • Borderline personality disorder in adult ([facility]) 09/18/2010     Priority:  Low   • Inappropriate sinus tachycardia 04/10/2019   • Asymptomatic bacteriuria 10/26/2018   • Palpitations 10/01/2018   • Chronic respiratory failure with hypoxia, on home oxygen therapy ([facility]) 08/08/2018   • Transaminitis 08/08/2018   • TONYA (obstructive sleep apnea) 01/09/2018   • Functional diarrhea 01/05/2018   • Breast wound 11/06/2017   • Intractable episodic cluster headache 09/14/2017   • Rash 06/09/2017   • Hashimoto's encephalopathy 05/17/2017   • Fall at home 05/13/2017   • On home oxygen therapy 04/15/2017   • Weakness of right upper extremity 02/23/2017   • Chronic suprapubic catheter ([facility]) 02/16/2017   • Hypovitaminosis D 11/29/2016   • Chronic pain syndrome 10/27/2016   • Bowel and bladder incontinence 10/27/2016   • Galactorrhea 07/22/2016   • Elevated sedimentation rate 06/27/2016   • Weakness of both lower extremities 06/22/2016   • Vitamin D deficiency 05/21/2016   • Morbidly obese ([facility]) 03/07/2016   • Scoliosis 03/07/2016   • GERD (gastroesophageal reflux disease) 03/07/2016   • Peripheral neuropathy ([facility]-H[facility]) 03/06/2016   • H/O prior ablation treatment 02/10/2016       Current Outpatient Prescriptions   Medication Sig Dispense Refill   • Ivabradine HCl 7.5 MG Tab Take 7.5 mg by mouth 2 Times a Day. 180 Tab 3   • methocarbamol (ROBAXIN) 750 MG Tab Take 750 mg by mouth 3 times a day.     • Diclofenac Sodium (VOLTAREN) 1 % Gel Apply 1 Inch to skin as directed 4 times a day. 5 Tube 6   • sodium bicarbonate (SODIUM BICARBONATE) 650 MG Tab Take 1 Tab by mouth 2 times a day. 360 Tab 3   • FLUoxetine (PROZAC) 10 MG Cap TAKE ONE CAPSULE BY MOUTH ONCE A DAY 30 Cap 2   • traZODone (DESYREL) 100 MG Tab TAKE  ONE TABLET BY MOUTH NIGHTLY AT BEDTIME 90 Tab 2   • Diphenhydramine-APAP, sleep, (TYLENOL PM EXTRA STRENGTH PO) Take  by mouth.     • nitrofurantoin monohyd macro (MACROBID) 100 MG Cap Take 1 Cap by mouth 2 times a day. 20 Cap 1   • nystatin (MYCOSTATIN) 582088 UNIT/GM Cream topical cream Apply 0.0001 g  to affected area(s) 2 times a day. 1 Tube 3   • cefdinir (OMNICEF) 300 MG Cap      • DEBLITANE 0.35 MG tablet      • LYRICA 300 MG capsule 2 times a day.     • ondansetron (ZOFRAN ODT) 4 MG TABLET DISPERSIBLE Take 1 Tab by mouth every 8 hours as needed. 10 Tab 6   • albuterol 108 (90 Base) MCG/ACT Aero Soln inhalation aerosol Inhale 2 Puffs by mouth every 6 hours as needed. 8.5 g 11   • ziprasidone (GEODON) 80 MG Cap TAKE TWO CAPSULES BY MOUTH DAILY AT BEDTIME 60 Cap 5   • methocarbamol (ROBAXIN) 500 MG Tab Take 500 mg by mouth 3 times a day.     • hydrocortisone (ANUSOL-HC) 25 MG Suppos Insert 1 Suppository in rectum every 12 hours. 10 Suppository 0   • dicyclomine (BENTYL) 10 MG Cap Take 1 Cap by mouth every 6 hours as needed (abdominal cramping). 20 Cap 1   • lactulose 10 GM/15ML Solution Take 10 g by mouth 2 times a day as needed.     • vitamin D, Ergocalciferol, (DRISDOL) 06853 units Cap capsule Take 50,000 Units by mouth every 7 days.     • omeprazole (PRILOSEC) 20 MG delayed-release capsule Take 1 Cap by mouth every day. 90 Cap 3   • busPIRone (BUSPAR) 5 MG tablet Take 1 Tab by mouth 2 times a day. 60 Tab 5   • lidocaine (LIDODERM) 5 % Patch Apply 1 Patch to skin as directed every 24 hours. 10 Patch 2   • baclofen (LIORESAL) 10 MG Tab TAKE ONE TABLET BY MOUTH THREE TIMES DAILY 90 Tab 4   • cholestyramine (QUESTRAN,PREVALITE) 4 GM Pack Take 4 g by mouth every morning as needed.     • Melatonin 10 MG Cap Take 10 mg by mouth every bedtime.     • furosemide (LASIX) 40 MG Tab Take 80 mg by mouth every day.     • cyanocobalamin ([employer] VITAMIN B-12) 500 MCG tablet Take 500 mcg by mouth every day.     • aspirin EC (ECOTRIN) 81 MG Tablet Delayed Response Take 1 Tab by mouth every day. 30 Tab 6     No current facility-administered medications for this visit.          Allergies as of 04/24/2019 - Reviewed 04/24/2019   Allergen Reaction Noted   • Cefdinir Shortness of Breath and Itching 03/01/2016   • Depakote [divalproex  sodium] Unspecified 06/14/2010   • Doxycycline Anaphylaxis and Vomiting 08/15/2012   • Abilify Unspecified 01/17/2013   • Amitriptyline Unspecified 10/31/2013   • Amoxicillin Rash 09/18/2010   • Ciprofloxacin Rash 12/17/2009   • Clindamycin Nausea 02/02/2011   • Ees [erythromycin] Vomiting and Nausea 08/28/2010   • Flagyl [metronidazole hcl] Unspecified 03/31/2011   • Flomax [tamsulosin hydrochloride] Swelling 09/24/2009   • Metformin Unspecified 07/23/2013   • Sulfa drugs Hives and Rash 09/18/2010   • Tape Rash 08/15/2012   • Vancomycin Itching 07/10/2016   • Wound dressing adhesive Hives 01/12/2018   • Cephalexin [keflex] Rash 01/01/2017   • Levofloxacin Unspecified 10/27/2016   • Metronidazole Rash 03/30/2017   • Valproic acid Rash 03/30/2017        ROS: Denies Chest pain, SOB, LE edema.    /66 (BP Location: Left arm, Patient Position: Sitting) Comment (BP Location): Forearm  Pulse 69   Temp 36.5 °C (97.7 °F)   SpO2 94%     Physical Exam:  Gen:         Alert and oriented, No apparent distress.  Neck:        No Lymphadenopathy or Bruits.  Lungs:     Clear to auscultation bilaterally  CV:          Regular rate and rhythm. No murmurs, rubs or gallops.               Ext:          No clubbing, cyanosis, edema.      Assessment and Plan.   28 y.o. female with the following issues.    1. Plantar fasciitis  Refer to physical therapy recommending topical anti-inflammatory  - REFERRAL TO PHYSICAL THERAPY Reason for Therapy: Eval/Treat/Report    2. Chronic right shoulder pain  Again refer to physical therapy  - REFERRAL TO PHYSICAL THERAPY Reason for Therapy: Eval/Treat/Report    3. Right wrist pain  Discussion today about referral for injection she declines for now will use the topical anti-inflammatory here as well and if not improving contact office for referral.

## 2019-04-29 ENCOUNTER — PHYSICAL THERAPY (OUTPATIENT)
Dept: PHYSICAL THERAPY | Facility: REHABILITATION | Age: 29
End: 2019-04-29
Attending: INTERNAL MEDICINE
Payer: MEDICARE

## 2019-04-29 DIAGNOSIS — G89.29 CHRONIC RIGHT SHOULDER PAIN: ICD-10-CM

## 2019-04-29 DIAGNOSIS — M72.2 PLANTAR FASCIITIS: ICD-10-CM

## 2019-04-29 DIAGNOSIS — M25.511 CHRONIC RIGHT SHOULDER PAIN: ICD-10-CM

## 2019-04-29 PROCEDURE — 97162 PT EVAL MOD COMPLEX 30 MIN: CPT

## 2019-04-29 PROCEDURE — 97110 THERAPEUTIC EXERCISES: CPT

## 2019-04-29 SDOH — ECONOMIC STABILITY: GENERAL: QUALITY OF LIFE: POOR

## 2019-04-29 ASSESSMENT — ENCOUNTER SYMPTOMS
QUALITY: ACHING
PAIN SCALE AT LOWEST: 0
QUALITY: STABBING
PAIN SCALE AT HIGHEST: 9
PAIN SCALE: 3

## 2019-04-29 NOTE — OP THERAPY EVALUATION
Outpatient Physical Therapy  INITIAL EVALUATION    [facility] Physical Therapy [address]  [address]  [place] [address]  Phone:  [phone]  Fax:  [phone]    Date of Evaluation: 04/29/2019    Patient: Kristin Balderrama  YOB: 1990  MRN: [id number]     Referring Provider: Torres Brody M.D.  [address] [place] [address] [address]  [place] [address]   Referring Diagnosis Plantar fasciitis [M72.2];Chronic right shoulder pain [M25.511, G89.29]     Time Calculation  Start time: 1000  Stop time: 1100 Time Calculation (min): 60 minutes     Physical Therapy Occurrence Codes    Date of onset of impairment:  4/24/19   Date physical therapy care plan established or reviewed:  4/29/19   Date physical therapy treatment started:  4/29/19          Chief Complaint: Shoulder Problem; Foot Problem; and Loss Of Balance    Visit Diagnoses     ICD-10-CM   1. Plantar fasciitis M72.2   2. Chronic right shoulder pain M25.511    G89.29         Subjective:   History of Present Illness:     Mechanism of injury:  28 year old female with multiple co-morbidities presents with chief complaints of 1  month history of R shoulder pain following fall and 3-4 month history of bilateral L>R foot pain.       Due to foot pain, she began using manual wheelchair at home and in the community 1.5 months ago (used 4WW and furniture at baseline). After her fall and shoulder injury, the patient has had increased shoulder pain with continued WC use.      She has also been wearing an AFO R for approximately 5 Years, and has drop foot in both feet although worse R>L.     The patient has a history of bowel/bladder incontinence, and reports that she has a electrical stimulation device for bowels and bladder.      Also has a history of prior R shoulder RC tears. 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       The patient has a history of several  "stays in skilled nursing following falls.         Quality of life:  Poor  Prior level of function:  Furniture walking, 4WW bariatric for commnity ambulation. Bariatric walker does not fit in her home.   Headaches:  no headaches  Sleep disturbance:  Interrupted sleep  Pain:     Current pain rating:  3 (throb when sitting)    At best pain ratin (if not wearing band sock or standing on feet)    At worst pain ratin    Location:  Under arch/heel on both feet L>R, R wrist pain (wearing a brace) \"I tore something in it\" and R superior shoulder pain    Quality:  Aching and stabbing (R shoulder \"aching\" feet \"stabbing\")    Pain timing: shoulder pain worse with activity, foot pain worse with WB.    Alleviating factors: foot pain relieved by rest and removing special socks, R shoulder relieved by rest/\"letting it calm down on its own\"    Exacerbated by: Feet = WB,  R shoulder worse when transferring and with activity.  Social Support:     Lives in:  One-story house (lives on first floor of home with her grandparents)  Treatments:     Previous treatment:  Injection treatment and physical therapy  Activities of Daily Living:     Patient reported ADL status: Patient is on supplemental 02 as needed - 2-4 litres.     Patient reports that her grandmother and a PCA  dress her and help her wash her back, bottom, legs, bathe her and prepare all her food, including cutting her food, due to wrist and shoulder pain. She also has difficulty bending due to her back fusion.    Patient's grandmother drove her here today, normally [employer] is her transportation.       Patient reports that her grandmother is taking care of 3 other relatives in the home.      Patient Goals:     Patient goals for therapy:  [place] with ADLs/IADLs, increased motion, improved balance, decreased pain and increased strength    Other patient goals:  To get back to walking around the house, to be more independent with ALDs/IADLs      Past Medical History: " "  Diagnosis Date   • Abdominal pain    • Anginal syndrome     random chest pain especially with tachycardia   • Apnea, sleep    • Arrhythmia     \"sinus tachycardia\", cariologist, Dr. Kumar; ablation 2/2016   • Arthritis     osteo   • ASTHMA     when around smoke   • Atrial fibrillation (HCC)    • Back pain    • Borderline personality disorder (HCC)    • Breath shortness     with tachycardia   • Cardiac arrhythmia    • Chickenpox    • Chronic UTI 9/18/2010   • Cough    • Daytime sleepiness    • Depression    • Diabetes (HCC)    • Diarrhea    • Disorder of thyroid    • Fall    • Fatigue    • Frequent headaches    • Gasping for breath    • Gynecological disorder     PCOS   • Headache(784.0)    • Heart burn    • History of falling    • Hypertension    • Migraine    • 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HCC)    • 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HCC)    • Nausea    • Obesity    • Pain 08-15-12    back, 7/10   • Painful joint    • PCOS (polycystic ovarian syndrome)    • Pneumonia 2012   • Psychosis (HCC)    • Renal disorder     \"kidney disease, stage 1\" nephrologist, Dr. 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   • Urinary bladder disorder     Suprapubic cath   • Urinary incontinence    • Weakness    • Wears glasses      Past Surgical History:   Procedure Laterality Date   • MUSCLE BIOPSY Right 1/26/2017    Procedure: MUSCLE BIOPSY - THIGH;  Surgeon: Isidro Vigil M.D.;  Location: SURGERY [facility] ORS;  Service:    • GASTROSCOPY WITH BALLOON DILATATION N/A 1/4/2017    Procedure: GASTROSCOPY WITH DILATATION;  Surgeon: Torres Vargas M.D.;  Location: SURGERY [facility];  Service:    • BOWEL STIMULATOR INSERTION  7/13/2016    Procedure: BOWEL STIMULATOR INSERTION FOR PERMANENT INTERSTIM SACRAL IMPLANT;  " Surgeon: Joe Noyola M.D.;  Location: SURGERY [facility];  Service:    • RECOVERY  1/27/2016    Procedure: CATH LAB EP STUDY WITH SINUS NODE MODIFICATION ABHINAV;  Surgeon: Recoveryonly Surgery;  Location: SURGERY PRE-POST PROC UNIT [facility];  Service:    • OTHER CARDIAC SURGERY  1/2016    cardiac ablation   • NEURO DEST FACET L/S W/IG SNGL  4/21/2015    Performed by Reza Tabor at SURGERY SURGICAL [facility]   • LUMBAR FUSION ANTERIOR  8/21/2012    Performed by SUSIE SAWANT at SURGERY [facility] ORS   • ALYSSA BY LAPAROSCOPY  8/29/2010    Performed by SHAYY JOHNS at SURGERY [facility] ORS   • LAMINOTOMY     • OTHER ABDOMINAL SURGERY     • TONSILLECTOMY      tonsillectomy     Social History   Substance Use Topics   • Smoking status: Never Smoker   • Smokeless tobacco: Never Used   • Alcohol use No     Family and Occupational History     Social History   • Marital status: Single     Spouse name: N/A   • Number of children: N/A   • Years of education: N/A       Objective     Static Posture     Head  Forward.    Shoulders  Rounded.    Knee   Genu valgus.   Knee (Left): Recurvatum.   Knee (Right): Recurvatum.     Cervical Screen    Cervical range of motion within normal limits    Active Range of Motion   Left Shoulder   Flexion: 140 degrees   Abduction: 140 degrees   External rotation 0°: 90 degrees   Internal rotation 90°: WFL  Horizontal abduction: WFL  Horizontal adduction: WFL    Right Shoulder   Flexion: 95 degrees with pain  Abduction: 95 degrees with pain  External rotation 0°: 90 degrees with pain  External rotation 90°: WFL  Internal rotation 90°: WFL  Horizontal abduction: WFL  Horizontal adduction: WFL    Passive Range of Motion   Left Shoulder   Normal passive range of motion    Right Shoulder   Normal passive range of motion    Strength:      Left Shoulder   Planes of Motion   Flexion: 4   Extension: 4   Abduction: 4   Adduction: 4   External rotation at 0°: 4+   External rotation at 45°: 4+    External rotation at 90°: 4+   Internal rotation at 0°: 4+     Right Shoulder   Planes of Motion   Flexion: 2+   Extension: 4   Abduction: 2+   External rotation at 0°: 4   External rotation at 45°: 4   External rotation at 90°: 4   Internal rotation at 0°: 4+     Tests     Left Shoulder   Negative drop arm and empty can.     Right Shoulder   Positive empty can.   Negative drop arm.     Activities of Daily Living:   Transfers/Mobility:     Bed/chair transfers: supervision       Bed/chair transfer equipment used: BUE support on hand rails    Sit to stand: contact guard assist    ADL Comments:  Hospital bed, trapeze, slide board, bedside commode, tub/transfer bench with back.  No grab bars in her bathroom, but there are grab bars where the tub is.     Her Drive wheelchair doesn't fit in the house, so she uses a smaller wheelchair there. She sometimes gets stuck in it.    Performs stand pivot transfers, but with supervision as she is concerned about falling and has fallen several times recently.         Therapeutic Exercises (CPT 27866):       Therapeutic Exercise Summary: Access Code: [email or web address]   URL: [email or web address]   Date: 04/29/2019   Prepared by: Ursula Escalera      Exercises  · Seated Scapular Retraction - 10 reps - 2 sets - 5 sec hold - 4x daily - 7x weekly  · Seated Bilateral Shoulder External Rotation with Resistance - 10 reps - 2 sets - 4x daily - 7x weekly          Time-based treatments/modalities:  Therapeutic exercise minutes (CPT 31478): 10 minutes       Assessment, Response and Plan:   Impairments: abnormal coordination, abnormal gait, abnormal or restricted ROM, impaired functional mobility, impaired balance, impaired physical strength, lacks appropriate home exercise program, limited ADL's, limited mobility, pain with function, safety issue and weight-bearing intolerance    Assessment details:  28 year old female presents in manual wheelchair and R AFO with chief complaints of subacute R  shoulder and wrist pain after a fall, and 3-4 month history of bilateral heel pain L>R. This exam focused on the patient's R shoulder pain, given ongoing symptom provocation with her primary means of ambulation currently (manual WC). Further examination of bilateral feet will occur at future follow up. Exam findings show likely RC pathology contributing to symptom provocation. The patient also exhibits poor body mechanics in operating her WC, with impaired scapulo-humeral rhythm and periscapular strength/endurance further contributing to symptom provocation.  Patient will benefit from skilled PT to address the above listed impairments/limitations in order to improve safety and function.  Barriers to therapy:  Psychosocial and comorbidities  Prognosis: fair    Goals:   Short Term Goals:   Patient is independent/compliant with HEP  Patient demonstrates improved body mechanics in negotiating door frames, turns with manual WC without cueing from therapist    Short term goal time span:  2-4 weeks      Long Term Goals:    QuickDASH score improved >/= MCID  R shoulder muscle performance >/= 4+/5 in all planes  R shoulder pain </= 1/10 with transfers and WC use  Patient reports no falls  PT evaluation of bilateral feet to include further goal setting/additions to POC    Long term goal time span:  6-8 weeks    Plan:   Therapy options:  Physical therapy treatment to continue  Planned therapy interventions:  Neuromuscular Re-education (CPT 51285), Gait Training (CPT 68840), Therapeutic Exercise (CPT 29590) and Therapeutic Activities (CPT 38060)  Frequency:  2x week  Duration in weeks:  8  Discussed with:  Patient      Functional Limitations and Severity Modifiers  Quickdash General Total Score: 70.45     Referring provider co-signature:  I have reviewed this plan of care and my co-signature certifies the need for services.  Certification Dates:   From 4/29/2019     To 6/29/2019    Physician Signature:  ________________________________ Date: ______________

## 2019-04-30 ASSESSMENT — ACTIVITIES OF DAILY LIVING (ADL): POOR_BALANCE: 1

## 2019-05-01 ENCOUNTER — PHYSICAL THERAPY (OUTPATIENT)
Dept: PHYSICAL THERAPY | Facility: REHABILITATION | Age: 29
End: 2019-05-01
Attending: INTERNAL MEDICINE
Payer: MEDICARE

## 2019-05-01 DIAGNOSIS — M72.2 PLANTAR FASCIITIS: ICD-10-CM

## 2019-05-01 DIAGNOSIS — M25.511 CHRONIC RIGHT SHOULDER PAIN: ICD-10-CM

## 2019-05-01 DIAGNOSIS — G89.29 CHRONIC RIGHT SHOULDER PAIN: ICD-10-CM

## 2019-05-01 PROCEDURE — 97110 THERAPEUTIC EXERCISES: CPT

## 2019-05-01 NOTE — OP THERAPY DAILY TREATMENT
"  Outpatient Physical Therapy  DAILY TREATMENT     [facility] Physical [address].  [address]  [place] [address]  Phone:  [phone]  Fax:  [phone]    Date: 05/01/2019    Patient: Kristin Balderrama  YOB: 1990  MRN: [id number]     Time Calculation  Start time: 1135  Stop time: 1215 Time Calculation (min): 40 minutes     Chief Complaint: No chief complaint on file.    Visit #: 2    SUBJECTIVE:  Shoulder is a little less painful. Is trying to be more careful in how she goes through doorways. Still primarily using wheelchair in house, but did walk from bedroom to bathroom yesterday (about 10 feet). No falls.     OBJECTIVE:  Current objective measures:   R wrist ROM WF: pain at ulnar/dorsal region of wrist, worse with active wrist extension.           Therapeutic Exercises (CPT 87578):     1. Seated marching , 1 min x 2    2. Seated unilateral ER R shoulder white band, 15 x 2    3. Seated unilateral extension to neutral R shoulder white band, 15 x 2, report of \"clicking' R shoulder    4. HEP as below      Therapeutic Exercise Summary: Access Code: [email or web address]   URL: [email or web address]   Date: 05/01/2019   Prepared by: Ursula Escalera      Exercises  · Seated Bilateral Shoulder External Rotation with Resistance - 10 reps - 3 sets - 1x daily - 7x weekly  · Seated Biceps Curl - 10 reps - 3 sets - 1x daily - 7x weekly  · Seated Isometric Wrist Extension - 10 reps - 3 sets - 1x daily - 7x weekly  · Seated Gripping Towel - 10 reps - 3 sets - 1x daily - 7x weekly  · Seated Wrist Flexion Stretch - 2 reps - 3 sets - 30 sec hold - 1x daily - 7x weekly          Time-based treatments/modalities:  Therapeutic exercise minutes (CPT 78424): 35 minutes     ASSESSMENT:   Response to treatment: Tolerating progression of shoulder/trunk exercises but quick to fatigue. Evaluate bilateral feet at next visit.     PLAN/RECOMMENDATIONS:   Plan for treatment: therapy treatment to continue " next visit.  Planned interventions for next visit: continue with current treatment. Patient will bring her bariatric walker to next visit. Will need to be met in garage with wheelchair from clinic.

## 2019-05-07 ENCOUNTER — APPOINTMENT (OUTPATIENT)
Dept: PHYSICAL THERAPY | Facility: REHABILITATION | Age: 29
End: 2019-05-07
Attending: INTERNAL MEDICINE
Payer: MEDICARE

## 2019-05-09 ENCOUNTER — TELEPHONE (OUTPATIENT)
Dept: CARDIOLOGY | Facility: MEDICAL CENTER | Age: 29
End: 2019-05-09

## 2019-05-09 ENCOUNTER — PHYSICAL THERAPY (OUTPATIENT)
Dept: PHYSICAL THERAPY | Facility: REHABILITATION | Age: 29
End: 2019-05-09
Attending: INTERNAL MEDICINE
Payer: MEDICARE

## 2019-05-09 DIAGNOSIS — G89.29 CHRONIC RIGHT SHOULDER PAIN: ICD-10-CM

## 2019-05-09 DIAGNOSIS — M25.511 CHRONIC RIGHT SHOULDER PAIN: ICD-10-CM

## 2019-05-09 DIAGNOSIS — M72.2 PLANTAR FASCIITIS: ICD-10-CM

## 2019-05-09 PROCEDURE — 97110 THERAPEUTIC EXERCISES: CPT

## 2019-05-09 NOTE — TELEPHONE ENCOUNTER
needs clearance for hand surgery   Received: Today   Message Contents   TAY Webster/Kirsten       Patient saw Dr. Enriquez at [facility] today and he is requesting a signed note from KRYSTIAN that patient is cleared to go under anesthesia for 20 minutes for her hand surgery. Dr. Enriquez's #[phone]. Pt. #[phone].      Contacted patient to acknowledge message and explain will need SW to review.      Last OV 4/10/19  Dx: Inappropriate sinus tachycardia, Ablation for IST, 2016.Thyroid disorder, on chronic supplementation.  On ASA     Last MPI 4/6/2017   Baseline ECG:NSR, diffuse T wave flattening throughout     Stress ECG: No ischemic T wave changes with peak stress     Impression: Normal stress ECG, nuclear images pending    Last Echo 3/2/2017 Normal left ventricular systolic function.     Left ventricular ejection fraction is visually estimated to be 60%.     Mild concentric left ventricular hypertrophy.    Proposed sx:  Hand    To KRYSTIAN to review and advise

## 2019-05-09 NOTE — OP THERAPY DAILY TREATMENT
Outpatient Physical Therapy  DAILY TREATMENT     [facility] Physical [address].  [address]  [place] [address]  Phone:  [phone]  Fax:  [phone]    Date: 05/09/2019    Patient: Kristin Balderrama  YOB: 1990  MRN: [id number]     Time Calculation  Start time: 1030  Stop time: 1100 Time Calculation (min): 30 minutes     Chief Complaint: Foot Problem and Shoulder Problem    Visit #: 3    SUBJECTIVE:  Presents to appointment in a wrist hard splint and says that her wrist will be scoped by a Dr at [facility]. Now unable to use R arm for wheelchair propulsion so wall walking more at home using walls. Foot pain is her chief concern now that she is not using injured shoulder due to ipsilateral wrist restrictions.     Foot pain is L>R and in heels. Non radiating.   OBJECTIVE:  Current objective measures:     AROM ankle  Ankle DF Left: 0 deg Right: -10 deg  Inversion Left: WFL Right: 10 deg  Eversion: Left: WFL Right: WFL    PROM ankle  Ankle DF 5 deg. Bilaterally  Ankle Inversion/eversion: WFL bilaterally    AROM great toe:  L WFL  R: 0 deg    PROM great toe:  WFL bilaterally    Strength:   DF: Left: 4+/5 Right: 2+/5  Inversion: Left: 45 Right: 2+/5  Eversion: 4/5 bilaterally  Great toe extension: Left: 4+/5 Right: 1/5     Tenderness to palpation:   Left: peroneal tendon, lateral malleolus, plantar fascia at heel, several other areas of myofascial tenderness throughout ankle region  Right: plantar fascia at heel        Therapeutic Exercises (CPT 16658):       Therapeutic Exercise Summary: Access Code: [email or web address]   URL: [email or web address]   Date: 05/09/2019   Prepared by: Ursula Escalera      Exercises  · Seated Ankle Circles - 10 reps - 1 sets - 3x daily - 7x weekly  · Seated Toe Raise - 10 reps - 1 sets - 3x daily - 7x weekly  · Seated Plantar Fascia Stretch - 2 reps - 30 sec hold - 3x daily - 7x weekly  · Seated Calf Stretch with Strap - 2 reps - 30 sec hold - 3x daily - 7x  weekly          Time-based treatments/modalities:  Therapeutic exercise minutes (CPT 57740): 22 minutes     ASSESSMENT:   Response to treatment: Patient started on stretching and AROM program to address ankle stiffness and likely plantar fascitis L>R. Program limited to seated activities as the patient is having difficulty WB currently. Progress may be limited by patient's poor balance and lack of appropriate assistive device in narrow hallways in her home (bariatric walker and wheelchair do not fit in home, small wheelchair is manual and patient now unable to propel herself with wrist splinted). Trial avril-forearm walker L at next visit.     PLAN/RECOMMENDATIONS:   Plan for treatment: therapy treatment to continue next visit.  Planned interventions for next visit: continue with current treatment.

## 2019-05-10 NOTE — TELEPHONE ENCOUNTER
Discussed with SW.  Patient may proceed with proposed surgery.     Letter generated and faxed to Dr. Enriquez at [phone]    Patient contacted and informed

## 2019-05-14 ENCOUNTER — APPOINTMENT (OUTPATIENT)
Dept: PHYSICAL THERAPY | Facility: REHABILITATION | Age: 29
End: 2019-05-14
Attending: INTERNAL MEDICINE
Payer: MEDICARE

## 2019-05-14 ENCOUNTER — OFFICE VISIT (OUTPATIENT)
Dept: URGENT CARE | Facility: CLINIC | Age: 29
End: 2019-05-14
Payer: MEDICARE

## 2019-05-14 ENCOUNTER — TELEPHONE (OUTPATIENT)
Dept: SCHEDULING | Facility: IMAGING CENTER | Age: 29
End: 2019-05-14

## 2019-05-14 ENCOUNTER — APPOINTMENT (OUTPATIENT)
Dept: RADIOLOGY | Facility: IMAGING CENTER | Age: 29
End: 2019-05-14
Attending: PHYSICIAN ASSISTANT
Payer: MEDICARE

## 2019-05-14 VITALS
OXYGEN SATURATION: 94 % | BODY MASS INDEX: 46.15 KG/M2 | HEART RATE: 70 BPM | WEIGHT: 277 LBS | TEMPERATURE: 98 F | SYSTOLIC BLOOD PRESSURE: 134 MMHG | HEIGHT: 65 IN | DIASTOLIC BLOOD PRESSURE: 86 MMHG | RESPIRATION RATE: 24 BRPM

## 2019-05-14 DIAGNOSIS — R06.02 SOB (SHORTNESS OF BREATH): ICD-10-CM

## 2019-05-14 DIAGNOSIS — J98.8 RTI (RESPIRATORY TRACT INFECTION): ICD-10-CM

## 2019-05-14 PROCEDURE — 71046 X-RAY EXAM CHEST 2 VIEWS: CPT | Mod: TC | Performed by: PHYSICIAN ASSISTANT

## 2019-05-14 PROCEDURE — 99214 OFFICE O/P EST MOD 30 MIN: CPT | Performed by: PHYSICIAN ASSISTANT

## 2019-05-14 RX ORDER — AZITHROMYCIN 250 MG/1
TABLET, FILM COATED ORAL
Qty: 6 TAB | Refills: 0 | Status: SHIPPED | OUTPATIENT
Start: 2019-05-14 | End: 2019-05-20

## 2019-05-14 ASSESSMENT — ENCOUNTER SYMPTOMS
DIZZINESS: 0
COUGH: 1
HEADACHES: 1
CHILLS: 0
SINUS PAIN: 0
GASTROINTESTINAL NEGATIVE: 1
SORE THROAT: 1
SHORTNESS OF BREATH: 1
RHINORRHEA: 1
MYALGIAS: 0
SPUTUM PRODUCTION: 0
WHEEZING: 0
FEVER: 0
PALPITATIONS: 0

## 2019-05-14 NOTE — TELEPHONE ENCOUNTER
Spoke with Dr. Brody and he would like her to be evaluated at . Spoke with patient and she has an appt already with [facility] at 12:40.

## 2019-05-14 NOTE — PROGRESS NOTES
Subjective:      Kristin Balderrama is a 28 y.o. female who presents with Cough (lungs hurt,deep cough-x1 week) and Shortness of Breath (pt on oxygen,stil exp S.O.B)            Cough   This is a new problem. The current episode started 1 to 4 weeks ago. The problem has been unchanged. The problem occurs every few minutes. The cough is non-productive. Associated symptoms include headaches, nasal congestion, rhinorrhea, a sore throat and shortness of breath. Pertinent negatives include no chest pain, chills, ear pain, fever, myalgias, postnasal drip or wheezing.       PMH:  has a past medical history of Abdominal pain; Anginal syndrome; Apnea, sleep; Arrhythmia; Arthritis; ASTHMA; Atrial fibrillation ([facility]); Back pain; Borderline personality disorder ([facility]); Breath shortness; Cardiac arrhythmia; Chickenpox; Chronic UTI (9/18/2010); Cough; Daytime sleepiness; Depression; Diabetes ([facility]); Diarrhea; Disorder of thyroid; Fall; Fatigue; Frequent headaches; Gasping for breath; Gynecological disorder; Headache(784.0); Heart burn; History of falling; Hypertension; Migraine; 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[facility]); 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[facility]); Nausea; Obesity; Pain (08-15-12); Painful joint; PCOS (polycystic ovarian syndrome); Pneumonia (2012); Psychosis ([facility]); Renal disorder; Ringing in ears; Scoliosis; Shortness of breath; Sinus tachycardia (10/31/2013); Sleep apnea; Snoring; Tonsillitis; Tuberculosis; Urinary bladder disorder; Urinary incontinence; Weakness; and Wears glasses.  MEDS:   Current Outpatient Prescriptions:   •  azithromycin (ZITHROMAX) 250 MG Tab, Z-monroe, Use as directed., Disp: 6 Tab, Rfl: 0  •  Ivabradine HCl 7.5 MG Tab, Take 7.5 mg by mouth 2 Times a Day., Disp: 180 Tab, Rfl: 3  •  methocarbamol (ROBAXIN) 750 MG Tab, Take 750 mg by mouth 3 times a day., Disp: , Rfl:   •  Diclofenac Sodium (VOLTAREN) 1 % Gel, Apply 1 Inch to skin as directed 4 times a day., Disp: 5 Tube, Rfl: 6  •  sodium bicarbonate (SODIUM  BICARBONATE) 650 MG Tab, Take 1 Tab by mouth 2 times a day., Disp: 360 Tab, Rfl: 3  •  FLUoxetine (PROZAC) 10 MG Cap, TAKE ONE CAPSULE BY MOUTH ONCE A DAY, Disp: 30 Cap, Rfl: 2  •  traZODone (DESYREL) 100 MG Tab, TAKE  ONE TABLET BY MOUTH NIGHTLY AT BEDTIME, Disp: 90 Tab, Rfl: 2  •  Diphenhydramine-APAP, sleep, (TYLENOL PM EXTRA STRENGTH PO), Take  by mouth., Disp: , Rfl:   •  nystatin (MYCOSTATIN) 871401 UNIT/GM Cream topical cream, Apply 0.0001 g to affected area(s) 2 times a day., Disp: 1 Tube, Rfl: 3  •  DEBLITANE 0.35 MG tablet, , Disp: , Rfl:   •  LYRICA 300 MG capsule, 2 times a day., Disp: , Rfl:   •  ondansetron (ZOFRAN ODT) 4 MG TABLET DISPERSIBLE, Take 1 Tab by mouth every 8 hours as needed., Disp: 10 Tab, Rfl: 6  •  albuterol 108 (90 Base) MCG/ACT Aero Soln inhalation aerosol, Inhale 2 Puffs by mouth every 6 hours as needed., Disp: 8.5 g, Rfl: 11  •  ziprasidone (GEODON) 80 MG Cap, TAKE TWO CAPSULES BY MOUTH DAILY AT BEDTIME, Disp: 60 Cap, Rfl: 5  •  methocarbamol (ROBAXIN) 500 MG Tab, Take 500 mg by mouth 3 times a day., Disp: , Rfl:   •  hydrocortisone (ANUSOL-HC) 25 MG Suppos, Insert 1 Suppository in rectum every 12 hours., Disp: 10 Suppository, Rfl: 0  •  dicyclomine (BENTYL) 10 MG Cap, Take 1 Cap by mouth every 6 hours as needed (abdominal cramping)., Disp: 20 Cap, Rfl: 1  •  lactulose 10 GM/15ML Solution, Take 10 g by mouth 2 times a day as needed., Disp: , Rfl:   •  vitamin D, Ergocalciferol, (DRISDOL) 04190 units Cap capsule, Take 50,000 Units by mouth every 7 days., Disp: , Rfl:   •  omeprazole (PRILOSEC) 20 MG delayed-release capsule, Take 1 Cap by mouth every day., Disp: 90 Cap, Rfl: 3  •  busPIRone (BUSPAR) 5 MG tablet, Take 1 Tab by mouth 2 times a day., Disp: 60 Tab, Rfl: 5  •  lidocaine (LIDODERM) 5 % Patch, Apply 1 Patch to skin as directed every 24 hours., Disp: 10 Patch, Rfl: 2  •  baclofen (LIORESAL) 10 MG Tab, TAKE ONE TABLET BY MOUTH THREE TIMES DAILY, Disp: 90 Tab, Rfl: 4  •   "cholestyramine (QUESTRAN,PREVALITE) 4 GM Pack, Take 4 g by mouth every morning as needed., Disp: , Rfl:   •  Melatonin 10 MG Cap, Take 10 mg by mouth every bedtime., Disp: , Rfl:   •  furosemide (LASIX) 40 MG Tab, Take 80 mg by mouth every day., Disp: , Rfl:   •  cyanocobalamin ([employer]S VITAMIN B-12) 500 MCG tablet, Take 500 mcg by mouth every day., Disp: , Rfl:   •  aspirin EC (ECOTRIN) 81 MG Tablet Delayed Response, Take 1 Tab by mouth every day., Disp: 30 Tab, Rfl: 6  ALLERGIES:   Allergies   Allergen Reactions   • Cefdinir Shortness of Breath and Itching     Tolerated 1/18/17  Tolerates ceftriaxone    • Depakote [Divalproex Sodium] Unspecified     Muscle spasms/muscle pain and weakness     • Doxycycline Anaphylaxis and Vomiting     RXN=unknown   • Abilify Unspecified     Headaches/muscle twitching     • Amitriptyline Unspecified     Headaches     • Amoxicillin Rash     Pt states \"I get a rash\".     • Ciprofloxacin Rash     Pt states \"I get a rash\".     • Clindamycin Nausea     Even with food     • Ees [Erythromycin] Vomiting and Nausea   • Flagyl [Metronidazole Hcl] Unspecified     \"eye problems\"     • Flomax [Tamsulosin Hydrochloride] Swelling   • Metformin Unspecified     Increased lactic acid      • Sulfa Drugs Hives and Rash     RXN=since childhood  PATIENT DID FINE WITH BACTRIM X 2 COURSES 10/2018   • Tape Rash     Tears skin off   coban with Tegaderm tape ok  RXN=ongoing   • Vancomycin Itching     Pt becomes flushed in face and chest.   RXN=7/10/16   • Wound Dressing Adhesive Hives     By pt report   • Cephalexin [Keflex] Rash     Pt states she gets a rash with this medication  Tolerates ceftriaxone   • Levofloxacin Unspecified     Leg muscle cramps   • Metronidazole Rash     .   • Valproic Acid Rash     .     SURGHX:   Past Surgical History:   Procedure Laterality Date   • MUSCLE BIOPSY Right 1/26/2017    Procedure: MUSCLE BIOPSY - THIGH;  Surgeon: Isidro Vigil M.D.;  Location: SURGERY [facility];  " Service:    • GASTROSCOPY WITH BALLOON DILATATION N/A 1/4/2017    Procedure: GASTROSCOPY WITH DILATATION;  Surgeon: Torres Vargas M.D.;  Location: SURGERY [facility];  Service:    • BOWEL STIMULATOR INSERTION  7/13/2016    Procedure: BOWEL STIMULATOR INSERTION FOR PERMANENT INTERSTIM SACRAL IMPLANT;  Surgeon: Joe Noyola M.D.;  Location: SURGERY [facility];  Service:    • RECOVERY  1/27/2016    Procedure: CATH LAB EP STUDY WITH SINUS NODE MODIFICATION LA;  Surgeon: [facility];  Location: SURGERY PRE-POST PROC UNIT [facility];  Service:    • OTHER CARDIAC SURGERY  1/2016    cardiac ablation   • NEURO DEST FACET L/S W/IG SNGL  4/21/2015    Performed by Reza Tabor at [facility]   • LUMBAR FUSION ANTERIOR  8/21/2012    Performed by SUSIE SAWANT at [facility]   • ALYSSA BY LAPAROSCOPY  8/29/2010    Performed by SHAYY JOHNS at SURGERY [facility]   • LAMINOTOMY     • OTHER ABDOMINAL SURGERY     • TONSILLECTOMY      tonsillectomy     SOCHX:  reports that she has never smoked. She has never used smokeless tobacco. She reports that she uses drugs, including Marijuana and Inhaled, about 7 times per week. She reports that she does not drink alcohol.  FH: family history includes Genitourinary () in her sister; Heart Disease in her maternal grandmother and mother; Hypertension in her maternal grandmother, maternal uncle, and mother; No Known Problems in her sister; Other in her mother and sister; Sleep Apnea in her mother.    Review of Systems   Constitutional: Negative for chills and fever.   HENT: Positive for rhinorrhea and sore throat. Negative for congestion, ear pain, postnasal drip and sinus pain.    Respiratory: Positive for cough and shortness of breath. Negative for sputum production and wheezing.    Cardiovascular: Negative for chest pain, palpitations and leg swelling.   Gastrointestinal: Negative.    Musculoskeletal: Negative for joint pain and myalgias.  "  Neurological: Positive for headaches. Negative for dizziness.       Medications, Allergies, and current problem list reviewed today in Epic     Objective:     /86 (BP Location: Left arm, BP Cuff Size: Large adult)   Pulse 70   Temp 36.7 °C (98 °F) (Temporal)   Resp (!) 24   Ht 1.651 m (5' 5\")   Wt (!) 125.6 kg (277 lb)   SpO2 94% Comment: with oxygen  BMI 46.10 kg/m²      Physical Exam   Constitutional: She is oriented to person, place, and time. She appears well-developed and well-nourished. No distress.   HENT:   Head: Normocephalic and atraumatic.   Right Ear: Tympanic membrane and external ear normal.   Left Ear: Tympanic membrane and external ear normal.   Nose: Nose normal.   Mouth/Throat: Oropharynx is clear and moist. No oropharyngeal exudate.   Eyes: Conjunctivae are normal. Right eye exhibits no discharge. Left eye exhibits no discharge.   Neck: Normal range of motion. Neck supple.   Cardiovascular: Normal rate, regular rhythm, normal heart sounds and intact distal pulses.    Pulmonary/Chest: Effort normal and breath sounds normal. No respiratory distress. She has no wheezes. She has no rales.   Musculoskeletal: Normal range of motion. She exhibits no edema or tenderness.   No leg swelling or tenderness bilaterally   Lymphadenopathy:     She has no cervical adenopathy.   Neurological: She is alert and oriented to person, place, and time.   Skin: Skin is warm and dry. She is not diaphoretic.   Psychiatric: She has a normal mood and affect. Her behavior is normal. Judgment and thought content normal.   Nursing note and vitals reviewed.              Assessment/Plan:     1. SOB (shortness of breath)  DX-CHEST-2 VIEWS   2. RTI (respiratory tract infection)  azithromycin (ZITHROMAX) 250 MG Tab     Xray: NEG by my read. Radiology review pending.    28-year-old female with myriad of past medical history and drug allergies presents with 1 week of cough, shortness of breath, fatigue.  Exam shows PO2 " at baseline however she is on an oxygen tank.  Increased respiratory rate otherwise vitals normal.  Pulmonary exam shows no wheezes rhonchi or rales.  X-rays ordered which was negative.  She will be treated for acute bacterial bronchitis.  Vascular exam unremarkable denies chest pain or palpitations.  OTC meds and conservative measures as discussed    Return to clinic or go to ED if symptoms worsen or persist. Indications for ED discussed at length. Patient voices understanding. Follow-up with your primary care provider in 3-5 days. Red flags discussed. All side effects of medication discussed including allergic response, GI upset, tendon injury, etc.    Please note that this dictation was created using voice recognition software. I have made every reasonable attempt to correct obvious errors, but I expect that there are errors of grammar and possibly content that I did not discover before finalizing the note.

## 2019-05-17 ENCOUNTER — HOSPITAL ENCOUNTER (OUTPATIENT)
Dept: LAB | Facility: MEDICAL CENTER | Age: 29
End: 2019-05-17
Attending: PSYCHIATRY & NEUROLOGY
Payer: MEDICARE

## 2019-05-20 ENCOUNTER — OFFICE VISIT (OUTPATIENT)
Dept: MEDICAL GROUP | Facility: MEDICAL CENTER | Age: 29
End: 2019-05-20
Payer: MEDICARE

## 2019-05-20 VITALS
DIASTOLIC BLOOD PRESSURE: 86 MMHG | WEIGHT: 269 LBS | TEMPERATURE: 98.2 F | SYSTOLIC BLOOD PRESSURE: 124 MMHG | HEART RATE: 90 BPM | OXYGEN SATURATION: 97 % | BODY MASS INDEX: 44.82 KG/M2 | HEIGHT: 65 IN

## 2019-05-20 DIAGNOSIS — R05.9 COUGH: ICD-10-CM

## 2019-05-20 PROCEDURE — 99213 OFFICE O/P EST LOW 20 MIN: CPT | Performed by: INTERNAL MEDICINE

## 2019-05-20 RX ORDER — MUPIROCIN CALCIUM 20 MG/G
1 CREAM TOPICAL 2 TIMES DAILY
Qty: 1 TUBE | Refills: 0 | Status: SHIPPED | OUTPATIENT
Start: 2019-05-20 | End: 2019-05-27

## 2019-05-20 RX ORDER — METHYLPREDNISOLONE 4 MG/1
TABLET ORAL
Qty: 21 TAB | Refills: 0 | Status: SHIPPED | OUTPATIENT
Start: 2019-05-20 | End: 2019-05-27

## 2019-05-20 NOTE — PROGRESS NOTES
CC: Cough    HPI:   Kristin presents today with the following.    1. Cough  Presents complaining of a cough for now approximately 7 days.  She was seen in urgent care reportedly given an antibiotic which she completed the course.  X-rays at that time were normal.  She reports the cough is now productive of yellowish sputum she has no fevers or chills is still feeling short of breath but satting well well on her baseline oxygen.  No new symptomology      Patient Active Problem List    Diagnosis Date Noted   • Left leg weakness 07/14/2018     Priority: High   • Controlled type 2 diabetes mellitus without complication, without long-term current use of insulin ([facility]) 04/26/2017     Priority: High   • Sinus tachycardia 10/31/2013     Priority: High   • Chronic inflammatory arthritis 05/23/2016     Priority: Medium   • Morbid obesity with BMI of 45.0-49.9, adult ([facility]) 10/24/2017     Priority: Low   • Depression 10/28/2016     Priority: Low   • Schizophrenia ([facility]) 10/27/2016     Priority: Low   • Psychosis, schizophrenia, simple ([facility]) 09/29/2016     Priority: Low     Class: Chronic   • Acquired hypothyroidism 11/23/2015     Priority: Low   • PCOS (polycystic ovarian syndrome) 11/23/2015     Priority: Low   • Progressive focal motor weakness 06/28/2015     Priority: Low   • Fatty liver disease, nonalcoholic 01/19/2015     Priority: Low   • Anxiety 12/16/2014     Priority: Low   • Knee pain, right 02/13/2014     Priority: Low   • Neurogenic bladder 04/02/2011     Priority: Low   • Chronic UTI 09/18/2010     Priority: Low   • Borderline personality disorder in adult ([facility]) 09/18/2010     Priority: Low   • Inappropriate sinus tachycardia 04/10/2019   • Asymptomatic bacteriuria 10/26/2018   • Palpitations 10/01/2018   • Chronic respiratory failure with hypoxia, on home oxygen therapy ([facility]) 08/08/2018   • Transaminitis 08/08/2018   • TONYA (obstructive sleep apnea) 01/09/2018   • Functional diarrhea 01/05/2018   • Breast wound  11/06/2017   • Intractable episodic cluster headache 09/14/2017   • Rash 06/09/2017   • Hashimoto's encephalopathy 05/17/2017   • Fall at home 05/13/2017   • On home oxygen therapy 04/15/2017   • Weakness of right upper extremity 02/23/2017   • Chronic suprapubic catheter (HCC) 02/16/2017   • Hypovitaminosis D 11/29/2016   • Chronic pain syndrome 10/27/2016   • Bowel and bladder incontinence 10/27/2016   • Galactorrhea 07/22/2016   • Elevated sedimentation rate 06/27/2016   • Weakness of both lower extremities 06/22/2016   • Vitamin D deficiency 05/21/2016   • Morbidly obese (HCC) 03/07/2016   • Scoliosis 03/07/2016   • GERD (gastroesophageal reflux disease) 03/07/2016   • Peripheral neuropathy ([facility]) 03/06/2016   • H/O prior ablation treatment 02/10/2016       Current Outpatient Prescriptions   Medication Sig Dispense Refill   • MethylPREDNISolone (MEDROL DOSEPAK) 4 MG Tablet Therapy Pack Per package insert. 21 Tab 0   • mupirocin calcium (BACTROBAN) 2 % Cream Apply 1 g to affected area(s) 2 times a day. 1 Tube 0   • Ivabradine HCl 7.5 MG Tab Take 7.5 mg by mouth 2 Times a Day. 180 Tab 3   • methocarbamol (ROBAXIN) 750 MG Tab Take 750 mg by mouth 3 times a day.     • Diclofenac Sodium (VOLTAREN) 1 % Gel Apply 1 Inch to skin as directed 4 times a day. 5 Tube 6   • sodium bicarbonate (SODIUM BICARBONATE) 650 MG Tab Take 1 Tab by mouth 2 times a day. 360 Tab 3   • FLUoxetine (PROZAC) 10 MG Cap TAKE ONE CAPSULE BY MOUTH ONCE A DAY 30 Cap 2   • traZODone (DESYREL) 100 MG Tab TAKE  ONE TABLET BY MOUTH NIGHTLY AT BEDTIME 90 Tab 2   • Diphenhydramine-APAP, sleep, (TYLENOL PM EXTRA STRENGTH PO) Take  by mouth.     • nystatin (MYCOSTATIN) 140028 UNIT/GM Cream topical cream Apply 0.0001 g to affected area(s) 2 times a day. 1 Tube 3   • DEBLITANE 0.35 MG tablet      • LYRICA 300 MG capsule 2 times a day.     • ondansetron (ZOFRAN ODT) 4 MG TABLET DISPERSIBLE Take 1 Tab by mouth every 8 hours as needed. 10 Tab 6   •  albuterol 108 (90 Base) MCG/ACT Aero Soln inhalation aerosol Inhale 2 Puffs by mouth every 6 hours as needed. 8.5 g 11   • ziprasidone (GEODON) 80 MG Cap TAKE TWO CAPSULES BY MOUTH DAILY AT BEDTIME 60 Cap 5   • hydrocortisone (ANUSOL-HC) 25 MG Suppos Insert 1 Suppository in rectum every 12 hours. 10 Suppository 0   • dicyclomine (BENTYL) 10 MG Cap Take 1 Cap by mouth every 6 hours as needed (abdominal cramping). 20 Cap 1   • lactulose 10 GM/15ML Solution Take 10 g by mouth 2 times a day as needed.     • omeprazole (PRILOSEC) 20 MG delayed-release capsule Take 1 Cap by mouth every day. 90 Cap 3   • busPIRone (BUSPAR) 5 MG tablet Take 1 Tab by mouth 2 times a day. 60 Tab 5   • lidocaine (LIDODERM) 5 % Patch Apply 1 Patch to skin as directed every 24 hours. 10 Patch 2   • baclofen (LIORESAL) 10 MG Tab TAKE ONE TABLET BY MOUTH THREE TIMES DAILY 90 Tab 4   • cholestyramine (QUESTRAN,PREVALITE) 4 GM Pack Take 4 g by mouth every morning as needed.     • Melatonin 10 MG Cap Take 10 mg by mouth every bedtime.     • furosemide (LASIX) 40 MG Tab Take 80 mg by mouth every day.     • cyanocobalamin ([employer]S VITAMIN B-12) 500 MCG tablet Take 500 mcg by mouth every day.     • aspirin EC (ECOTRIN) 81 MG Tablet Delayed Response Take 1 Tab by mouth every day. 30 Tab 6   • methocarbamol (ROBAXIN) 500 MG Tab Take 500 mg by mouth 3 times a day.       No current facility-administered medications for this visit.          Allergies as of 05/20/2019 - Reviewed 05/20/2019   Allergen Reaction Noted   • Cefdinir Shortness of Breath and Itching 03/01/2016   • Depakote [divalproex sodium] Unspecified 06/14/2010   • Doxycycline Anaphylaxis and Vomiting 08/15/2012   • Abilify Unspecified 01/17/2013   • Amitriptyline Unspecified 10/31/2013   • Amoxicillin Rash 09/18/2010   • Ciprofloxacin Rash 12/17/2009   • Clindamycin Nausea 02/02/2011   • Ees [erythromycin] Vomiting and Nausea 08/28/2010   • Flagyl [metronidazole hcl] Unspecified 03/31/2011   •  "Flomax [tamsulosin hydrochloride] Swelling 09/24/2009   • Metformin Unspecified 07/23/2013   • Sulfa drugs Hives and Rash 09/18/2010   • Tape Rash 08/15/2012   • Vancomycin Itching 07/10/2016   • Wound dressing adhesive Hives 01/12/2018   • Cephalexin [keflex] Rash 01/01/2017   • Levofloxacin Unspecified 10/27/2016   • Metronidazole Rash 03/30/2017   • Valproic acid Rash 03/30/2017        ROS: Denies Chest pain, changes to bowel, LE edema.    /86 (BP Location: Left arm, Patient Position: Sitting)   Pulse 90   Temp 36.8 °C (98.2 °F)   Ht 1.651 m (5' 5\")   Wt 122 kg (269 lb)   SpO2 97%   BMI 44.76 kg/m²     Physical Exam:  Gen:         Alert and oriented, No apparent distress.  Heent:       TMs clear bilaterally, oropharynx without erythema or exudates  Neck:        No Lymphadenopathy or Bruits.  Lungs:     Clear to auscultation bilaterally  CV:          Regular rate and rhythm. No murmurs, rubs or gallops.               Ext:          No clubbing, cyanosis, edema.      Assessment and Plan.   28 y.o. female with the following issues.    1. Cough  Given the fact that she got antibiotics with no resolution of symptoms or improvement likely related to virus.  Again x-ray is normal on exam clear today.  Have placed on a Medrol Dosepak.  - MethylPREDNISolone (MEDROL DOSEPAK) 4 MG Tablet Therapy Pack; Per package insert.  Dispense: 21 Tab; Refill: 0      "

## 2019-05-21 ENCOUNTER — APPOINTMENT (OUTPATIENT)
Dept: PHYSICAL THERAPY | Facility: REHABILITATION | Age: 29
End: 2019-05-21
Attending: INTERNAL MEDICINE
Payer: MEDICARE

## 2019-05-23 ENCOUNTER — APPOINTMENT (OUTPATIENT)
Dept: PHYSICAL THERAPY | Facility: REHABILITATION | Age: 29
End: 2019-05-23
Attending: INTERNAL MEDICINE
Payer: MEDICARE

## 2019-05-24 ENCOUNTER — HOSPITAL ENCOUNTER (OUTPATIENT)
Dept: LAB | Facility: MEDICAL CENTER | Age: 29
End: 2019-05-24
Attending: PSYCHIATRY & NEUROLOGY
Payer: MEDICARE

## 2019-05-24 LAB
ALBUMIN SERPL BCP-MCNC: 4.8 G/DL (ref 3.2–4.9)
ALBUMIN/GLOB SERPL: 1.9 G/DL
ALP SERPL-CCNC: 63 U/L (ref 30–99)
ALT SERPL-CCNC: 35 U/L (ref 2–50)
ANION GAP SERPL CALC-SCNC: 11 MMOL/L (ref 0–11.9)
AST SERPL-CCNC: 13 U/L (ref 12–45)
BILIRUB SERPL-MCNC: 0.4 MG/DL (ref 0.1–1.5)
BUN SERPL-MCNC: 18 MG/DL (ref 8–22)
CALCIUM SERPL-MCNC: 9.1 MG/DL (ref 8.5–10.5)
CHLORIDE SERPL-SCNC: 106 MMOL/L (ref 96–112)
CO2 SERPL-SCNC: 21 MMOL/L (ref 20–33)
CREAT SERPL-MCNC: 0.7 MG/DL (ref 0.5–1.4)
CRP SERPL HS-MCNC: 0.14 MG/DL (ref 0–0.75)
FOLATE SERPL-MCNC: 16 NG/ML
GLOBULIN SER CALC-MCNC: 2.5 G/DL (ref 1.9–3.5)
GLUCOSE SERPL-MCNC: 90 MG/DL (ref 65–99)
POTASSIUM SERPL-SCNC: 4 MMOL/L (ref 3.6–5.5)
PROT SERPL-MCNC: 7.3 G/DL (ref 6–8.2)
SODIUM SERPL-SCNC: 138 MMOL/L (ref 135–145)
TREPONEMA PALLIDUM IGG+IGM AB [PRESENCE] IN SERUM OR PLASMA BY IMMUNOASSAY: NON REACTIVE
VIT B12 SERPL-MCNC: 472 PG/ML (ref 211–911)

## 2019-05-24 PROCEDURE — 82164 ANGIOTENSIN I ENZYME TEST: CPT

## 2019-05-24 PROCEDURE — 36415 COLL VENOUS BLD VENIPUNCTURE: CPT

## 2019-05-24 PROCEDURE — 86255 FLUORESCENT ANTIBODY SCREEN: CPT

## 2019-05-24 PROCEDURE — 86140 C-REACTIVE PROTEIN: CPT

## 2019-05-24 PROCEDURE — 86038 ANTINUCLEAR ANTIBODIES: CPT

## 2019-05-24 PROCEDURE — 82607 VITAMIN B-12: CPT

## 2019-05-24 PROCEDURE — 83516 IMMUNOASSAY NONANTIBODY: CPT

## 2019-05-24 PROCEDURE — 86147 CARDIOLIPIN ANTIBODY EA IG: CPT | Mod: 91

## 2019-05-24 PROCEDURE — 86780 TREPONEMA PALLIDUM: CPT | Mod: GA

## 2019-05-24 PROCEDURE — 82746 ASSAY OF FOLIC ACID SERUM: CPT

## 2019-05-24 PROCEDURE — 80053 COMPREHEN METABOLIC PANEL: CPT

## 2019-05-25 LAB — ACE SERPL-CCNC: 22 U/L (ref 9–67)

## 2019-05-26 LAB
CARDIOLIPIN IGA SER IA-ACNC: 1 APL (ref 0–11)
CARDIOLIPIN IGG SER IA-ACNC: 2 GPL (ref 0–14)
CARDIOLIPIN IGM SER IA-ACNC: 0 MPL (ref 0–12)
NUCLEAR IGG SER QL IA: NORMAL
TEST NAME 95000: NORMAL

## 2019-05-27 ENCOUNTER — APPOINTMENT (OUTPATIENT)
Dept: RADIOLOGY | Facility: MEDICAL CENTER | Age: 29
DRG: 123 | End: 2019-05-27
Attending: EMERGENCY MEDICINE
Payer: MEDICARE

## 2019-05-27 ENCOUNTER — HOSPITAL ENCOUNTER (INPATIENT)
Facility: MEDICAL CENTER | Age: 29
LOS: 2 days | DRG: 123 | End: 2019-05-30
Attending: EMERGENCY MEDICINE | Admitting: HOSPITALIST
Payer: MEDICARE

## 2019-05-27 PROBLEM — H46.9 OPTIC NEURITIS: Status: ACTIVE | Noted: 2019-05-27

## 2019-05-27 LAB
ANION GAP SERPL CALC-SCNC: 10 MMOL/L (ref 0–11.9)
BASOPHILS # BLD AUTO: 0.6 % (ref 0–1.8)
BASOPHILS # BLD: 0.05 K/UL (ref 0–0.12)
BUN SERPL-MCNC: 16 MG/DL (ref 8–22)
CALCIUM SERPL-MCNC: 9.4 MG/DL (ref 8.5–10.5)
CHLORIDE SERPL-SCNC: 106 MMOL/L (ref 96–112)
CO2 SERPL-SCNC: 23 MMOL/L (ref 20–33)
CREAT SERPL-MCNC: 0.81 MG/DL (ref 0.5–1.4)
CRP SERPL HS-MCNC: 0.78 MG/DL (ref 0–0.75)
EOSINOPHIL # BLD AUTO: 0.15 K/UL (ref 0–0.51)
EOSINOPHIL NFR BLD: 1.7 % (ref 0–6.9)
ERYTHROCYTE [DISTWIDTH] IN BLOOD BY AUTOMATED COUNT: 41.8 FL (ref 35.9–50)
ERYTHROCYTE [SEDIMENTATION RATE] IN BLOOD BY WESTERGREN METHOD: 9 MM/HOUR (ref 0–20)
GLUCOSE BLD-MCNC: 101 MG/DL (ref 65–99)
GLUCOSE SERPL-MCNC: 96 MG/DL (ref 65–99)
HCT VFR BLD AUTO: 41.9 % (ref 37–47)
HGB BLD-MCNC: 14.2 G/DL (ref 12–16)
IMM GRANULOCYTES # BLD AUTO: 0.02 K/UL (ref 0–0.11)
IMM GRANULOCYTES NFR BLD AUTO: 0.2 % (ref 0–0.9)
LYMPHOCYTES # BLD AUTO: 3.15 K/UL (ref 1–4.8)
LYMPHOCYTES NFR BLD: 36.5 % (ref 22–41)
MCH RBC QN AUTO: 30.3 PG (ref 27–33)
MCHC RBC AUTO-ENTMCNC: 33.9 G/DL (ref 33.6–35)
MCV RBC AUTO: 89.3 FL (ref 81.4–97.8)
MONOCYTES # BLD AUTO: 0.51 K/UL (ref 0–0.85)
MONOCYTES NFR BLD AUTO: 5.9 % (ref 0–13.4)
NEUTROPHILS # BLD AUTO: 4.74 K/UL (ref 2–7.15)
NEUTROPHILS NFR BLD: 55.1 % (ref 44–72)
NRBC # BLD AUTO: 0 K/UL
NRBC BLD-RTO: 0 /100 WBC
PLATELET # BLD AUTO: 196 K/UL (ref 164–446)
PMV BLD AUTO: 11.3 FL (ref 9–12.9)
POTASSIUM SERPL-SCNC: 4.1 MMOL/L (ref 3.6–5.5)
PROCALCITONIN SERPL-MCNC: <0.05 NG/ML
RBC # BLD AUTO: 4.69 M/UL (ref 4.2–5.4)
SODIUM SERPL-SCNC: 139 MMOL/L (ref 135–145)
WBC # BLD AUTO: 8.6 K/UL (ref 4.8–10.8)

## 2019-05-27 PROCEDURE — 70470 CT HEAD/BRAIN W/O & W/DYE: CPT

## 2019-05-27 PROCEDURE — 700105 HCHG RX REV CODE 258: Performed by: HOSPITALIST

## 2019-05-27 PROCEDURE — G0378 HOSPITAL OBSERVATION PER HR: HCPCS

## 2019-05-27 PROCEDURE — 84145 PROCALCITONIN (PCT): CPT

## 2019-05-27 PROCEDURE — 96365 THER/PROPH/DIAG IV INF INIT: CPT

## 2019-05-27 PROCEDURE — A9270 NON-COVERED ITEM OR SERVICE: HCPCS | Performed by: HOSPITALIST

## 2019-05-27 PROCEDURE — 86140 C-REACTIVE PROTEIN: CPT

## 2019-05-27 PROCEDURE — 700117 HCHG RX CONTRAST REV CODE 255: Performed by: EMERGENCY MEDICINE

## 2019-05-27 PROCEDURE — 700111 HCHG RX REV CODE 636 W/ 250 OVERRIDE (IP): Performed by: HOSPITALIST

## 2019-05-27 PROCEDURE — 70482 CT ORBIT/EAR/FOSSA W/O&W/DYE: CPT

## 2019-05-27 PROCEDURE — 85652 RBC SED RATE AUTOMATED: CPT

## 2019-05-27 PROCEDURE — 700102 HCHG RX REV CODE 250 W/ 637 OVERRIDE(OP): Performed by: HOSPITALIST

## 2019-05-27 PROCEDURE — 85025 COMPLETE CBC W/AUTO DIFF WBC: CPT

## 2019-05-27 PROCEDURE — 99285 EMERGENCY DEPT VISIT HI MDM: CPT

## 2019-05-27 PROCEDURE — 700111 HCHG RX REV CODE 636 W/ 250 OVERRIDE (IP): Performed by: EMERGENCY MEDICINE

## 2019-05-27 PROCEDURE — 80048 BASIC METABOLIC PNL TOTAL CA: CPT

## 2019-05-27 PROCEDURE — 700101 HCHG RX REV CODE 250: Performed by: EMERGENCY MEDICINE

## 2019-05-27 PROCEDURE — 82962 GLUCOSE BLOOD TEST: CPT

## 2019-05-27 PROCEDURE — 96375 TX/PRO/DX INJ NEW DRUG ADDON: CPT

## 2019-05-27 PROCEDURE — 99220 PR INITIAL OBSERVATION CARE,LEVL III: CPT | Performed by: HOSPITALIST

## 2019-05-27 RX ORDER — OXYCODONE HYDROCHLORIDE 10 MG/1
10 TABLET ORAL
Status: DISCONTINUED | OUTPATIENT
Start: 2019-05-27 | End: 2019-05-30 | Stop reason: HOSPADM

## 2019-05-27 RX ORDER — MORPHINE SULFATE 4 MG/ML
4 INJECTION, SOLUTION INTRAMUSCULAR; INTRAVENOUS
Status: DISCONTINUED | OUTPATIENT
Start: 2019-05-27 | End: 2019-05-30 | Stop reason: HOSPADM

## 2019-05-27 RX ORDER — HYDROMORPHONE HYDROCHLORIDE 1 MG/ML
0.5 INJECTION, SOLUTION INTRAMUSCULAR; INTRAVENOUS; SUBCUTANEOUS ONCE
Status: COMPLETED | OUTPATIENT
Start: 2019-05-27 | End: 2019-05-27

## 2019-05-27 RX ORDER — OMEPRAZOLE 20 MG/1
20 CAPSULE, DELAYED RELEASE ORAL DAILY
Status: DISCONTINUED | OUTPATIENT
Start: 2019-05-28 | End: 2019-05-27

## 2019-05-27 RX ORDER — ERGOCALCIFEROL 1.25 MG/1
50000 CAPSULE ORAL
COMMUNITY
End: 2019-07-12

## 2019-05-27 RX ORDER — TRAZODONE HYDROCHLORIDE 100 MG/1
100 TABLET ORAL
Status: DISCONTINUED | OUTPATIENT
Start: 2019-05-27 | End: 2019-05-30 | Stop reason: HOSPADM

## 2019-05-27 RX ORDER — ZIPRASIDONE HYDROCHLORIDE 80 MG/1
80 CAPSULE ORAL 2 TIMES DAILY
COMMUNITY
End: 2019-09-08 | Stop reason: SDUPTHER

## 2019-05-27 RX ORDER — ZIPRASIDONE HYDROCHLORIDE 80 MG/1
80 CAPSULE ORAL 2 TIMES DAILY
Status: DISCONTINUED | OUTPATIENT
Start: 2019-05-27 | End: 2019-05-30 | Stop reason: HOSPADM

## 2019-05-27 RX ORDER — ACETAMINOPHEN AND CODEINE PHOSPHATE 120; 12 MG/5ML; MG/5ML
0.35 SOLUTION ORAL DAILY
COMMUNITY
End: 2019-08-13

## 2019-05-27 RX ORDER — BISACODYL 10 MG
10 SUPPOSITORY, RECTAL RECTAL
Status: DISCONTINUED | OUTPATIENT
Start: 2019-05-27 | End: 2019-05-30 | Stop reason: HOSPADM

## 2019-05-27 RX ORDER — PROMETHAZINE HYDROCHLORIDE 12.5 MG/1
12.5-25 SUPPOSITORY RECTAL EVERY 4 HOURS PRN
Status: DISCONTINUED | OUTPATIENT
Start: 2019-05-27 | End: 2019-05-30 | Stop reason: HOSPADM

## 2019-05-27 RX ORDER — BUSPIRONE HYDROCHLORIDE 5 MG/1
5 TABLET ORAL 2 TIMES DAILY
Status: DISCONTINUED | OUTPATIENT
Start: 2019-05-28 | End: 2019-05-30 | Stop reason: HOSPADM

## 2019-05-27 RX ORDER — PREGABALIN 75 MG/1
150 CAPSULE ORAL 2 TIMES DAILY
Status: DISCONTINUED | OUTPATIENT
Start: 2019-05-27 | End: 2019-05-30 | Stop reason: HOSPADM

## 2019-05-27 RX ORDER — PROMETHAZINE HYDROCHLORIDE 25 MG/1
25 TABLET ORAL EVERY 6 HOURS PRN
COMMUNITY
End: 2019-05-27

## 2019-05-27 RX ORDER — PROPARACAINE HYDROCHLORIDE 5 MG/ML
1 SOLUTION/ DROPS OPHTHALMIC ONCE
Status: COMPLETED | OUTPATIENT
Start: 2019-05-27 | End: 2019-05-27

## 2019-05-27 RX ORDER — ZIPRASIDONE HYDROCHLORIDE 80 MG/1
80 CAPSULE ORAL 2 TIMES DAILY
Status: DISCONTINUED | OUTPATIENT
Start: 2019-05-28 | End: 2019-05-27

## 2019-05-27 RX ORDER — POLYETHYLENE GLYCOL 3350 17 G/17G
1 POWDER, FOR SOLUTION ORAL
Status: DISCONTINUED | OUTPATIENT
Start: 2019-05-27 | End: 2019-05-30 | Stop reason: HOSPADM

## 2019-05-27 RX ORDER — ONDANSETRON 4 MG/1
4 TABLET, ORALLY DISINTEGRATING ORAL EVERY 4 HOURS PRN
Status: DISCONTINUED | OUTPATIENT
Start: 2019-05-27 | End: 2019-05-30 | Stop reason: HOSPADM

## 2019-05-27 RX ORDER — ONDANSETRON 2 MG/ML
4 INJECTION INTRAMUSCULAR; INTRAVENOUS EVERY 4 HOURS PRN
Status: DISCONTINUED | OUTPATIENT
Start: 2019-05-27 | End: 2019-05-30 | Stop reason: HOSPADM

## 2019-05-27 RX ORDER — AMOXICILLIN 250 MG
2 CAPSULE ORAL 2 TIMES DAILY
Status: DISCONTINUED | OUTPATIENT
Start: 2019-05-27 | End: 2019-05-30 | Stop reason: HOSPADM

## 2019-05-27 RX ORDER — PROMETHAZINE HYDROCHLORIDE 25 MG/1
12.5-25 TABLET ORAL EVERY 4 HOURS PRN
Status: DISCONTINUED | OUTPATIENT
Start: 2019-05-27 | End: 2019-05-30 | Stop reason: HOSPADM

## 2019-05-27 RX ORDER — FUROSEMIDE 40 MG/1
80 TABLET ORAL DAILY
Status: DISCONTINUED | OUTPATIENT
Start: 2019-05-28 | End: 2019-05-30 | Stop reason: HOSPADM

## 2019-05-27 RX ORDER — METHYLPREDNISOLONE SODIUM SUCCINATE 125 MG/2ML
1000 INJECTION, POWDER, LYOPHILIZED, FOR SOLUTION INTRAMUSCULAR; INTRAVENOUS ONCE
Status: DISCONTINUED | OUTPATIENT
Start: 2019-05-27 | End: 2019-05-27

## 2019-05-27 RX ORDER — METHOCARBAMOL 750 MG/1
750 TABLET, FILM COATED ORAL 3 TIMES DAILY
Status: DISCONTINUED | OUTPATIENT
Start: 2019-05-28 | End: 2019-05-30 | Stop reason: HOSPADM

## 2019-05-27 RX ORDER — OXYCODONE HYDROCHLORIDE 5 MG/1
5 TABLET ORAL
Status: DISCONTINUED | OUTPATIENT
Start: 2019-05-27 | End: 2019-05-30 | Stop reason: HOSPADM

## 2019-05-27 RX ORDER — FLUOXETINE 10 MG/1
10 CAPSULE ORAL DAILY
Status: DISCONTINUED | OUTPATIENT
Start: 2019-05-28 | End: 2019-05-30 | Stop reason: HOSPADM

## 2019-05-27 RX ADMIN — PROPARACAINE HYDROCHLORIDE 1 DROP: 5 SOLUTION/ DROPS OPHTHALMIC at 22:00

## 2019-05-27 RX ADMIN — SENNOSIDES, DOCUSATE SODIUM 2 TABLET: 50; 8.6 TABLET, FILM COATED ORAL at 22:33

## 2019-05-27 RX ADMIN — PREGABALIN 150 MG: 75 CAPSULE ORAL at 22:34

## 2019-05-27 RX ADMIN — IOHEXOL 100 ML: 350 INJECTION, SOLUTION INTRAVENOUS at 22:09

## 2019-05-27 RX ADMIN — HYDROMORPHONE HYDROCHLORIDE 0.5 MG: 1 INJECTION, SOLUTION INTRAMUSCULAR; INTRAVENOUS; SUBCUTANEOUS at 21:27

## 2019-05-27 RX ADMIN — SODIUM CHLORIDE 1000 MG: 9 INJECTION, SOLUTION INTRAVENOUS at 21:27

## 2019-05-27 RX ADMIN — TRAZODONE HYDROCHLORIDE 100 MG: 100 TABLET ORAL at 21:15

## 2019-05-27 ASSESSMENT — COGNITIVE AND FUNCTIONAL STATUS - GENERAL
STANDING UP FROM CHAIR USING ARMS: A LITTLE
MOBILITY SCORE: 17
MOVING FROM LYING ON BACK TO SITTING ON SIDE OF FLAT BED: A LITTLE
HELP NEEDED FOR BATHING: A LITTLE
DRESSING REGULAR LOWER BODY CLOTHING: A LITTLE
DRESSING REGULAR UPPER BODY CLOTHING: A LITTLE
DAILY ACTIVITIY SCORE: 20
WALKING IN HOSPITAL ROOM: A LITTLE
MOVING TO AND FROM BED TO CHAIR: A LITTLE
SUGGESTED CMS G CODE MODIFIER MOBILITY: CK
TOILETING: A LITTLE
SUGGESTED CMS G CODE MODIFIER DAILY ACTIVITY: CJ
CLIMB 3 TO 5 STEPS WITH RAILING: TOTAL

## 2019-05-27 ASSESSMENT — ENCOUNTER SYMPTOMS
VOMITING: 0
EYE PAIN: 1
NAUSEA: 0
EYE DISCHARGE: 0
HEARTBURN: 0
DIZZINESS: 0
BLURRED VISION: 1
PALPITATIONS: 0
MYALGIAS: 0
SPEECH CHANGE: 0
DOUBLE VISION: 1
ABDOMINAL PAIN: 0
SENSORY CHANGE: 0
SHORTNESS OF BREATH: 0
FEVER: 0
FLANK PAIN: 0
COUGH: 0
HEMOPTYSIS: 0
BRUISES/BLEEDS EASILY: 0
HALLUCINATIONS: 0
WEAKNESS: 1
DEPRESSION: 0
FOCAL WEAKNESS: 0
CHILLS: 0

## 2019-05-27 ASSESSMENT — COPD QUESTIONNAIRES
IN THE PAST 12 MONTHS DO YOU DO LESS THAN YOU USED TO BECAUSE OF YOUR BREATHING PROBLEMS: DISAGREE/UNSURE
DURING THE PAST 4 WEEKS HOW MUCH DID YOU FEEL SHORT OF BREATH: SOME OF THE TIME
DO YOU EVER COUGH UP ANY MUCUS OR PHLEGM?: NO/ONLY WITH OCCASIONAL COLDS OR INFECTIONS

## 2019-05-27 ASSESSMENT — LIFESTYLE VARIABLES
ALCOHOL_USE: NO
EVER_SMOKED: NEVER
SUBSTANCE_ABUSE: 0

## 2019-05-28 LAB
ALBUMIN SERPL BCP-MCNC: 4.3 G/DL (ref 3.2–4.9)
ALBUMIN/GLOB SERPL: 1.8 G/DL
ALP SERPL-CCNC: 75 U/L (ref 30–99)
ALT SERPL-CCNC: 35 U/L (ref 2–50)
ANION GAP SERPL CALC-SCNC: 12 MMOL/L (ref 0–11.9)
AQP4 H2O CHANNEL AB SERPL IA-ACNC: 1 U/ML
AST SERPL-CCNC: 16 U/L (ref 12–45)
BASOPHILS # BLD AUTO: 0.4 % (ref 0–1.8)
BASOPHILS # BLD: 0.03 K/UL (ref 0–0.12)
BILIRUB SERPL-MCNC: 0.4 MG/DL (ref 0.1–1.5)
BUN SERPL-MCNC: 14 MG/DL (ref 8–22)
CALCIUM SERPL-MCNC: 9.3 MG/DL (ref 8.5–10.5)
CHLORIDE SERPL-SCNC: 104 MMOL/L (ref 96–112)
CO2 SERPL-SCNC: 19 MMOL/L (ref 20–33)
CREAT SERPL-MCNC: 0.83 MG/DL (ref 0.5–1.4)
EOSINOPHIL # BLD AUTO: 0.02 K/UL (ref 0–0.51)
EOSINOPHIL NFR BLD: 0.2 % (ref 0–6.9)
ERYTHROCYTE [DISTWIDTH] IN BLOOD BY AUTOMATED COUNT: 41.2 FL (ref 35.9–50)
GLOBULIN SER CALC-MCNC: 2.4 G/DL (ref 1.9–3.5)
GLUCOSE BLD-MCNC: 122 MG/DL (ref 65–99)
GLUCOSE BLD-MCNC: 132 MG/DL (ref 65–99)
GLUCOSE BLD-MCNC: 132 MG/DL (ref 65–99)
GLUCOSE BLD-MCNC: 163 MG/DL (ref 65–99)
GLUCOSE SERPL-MCNC: 168 MG/DL (ref 65–99)
HCT VFR BLD AUTO: 43.1 % (ref 37–47)
HGB BLD-MCNC: 14.4 G/DL (ref 12–16)
IMM GRANULOCYTES # BLD AUTO: 0.04 K/UL (ref 0–0.11)
IMM GRANULOCYTES NFR BLD AUTO: 0.5 % (ref 0–0.9)
LYMPHOCYTES # BLD AUTO: 1.01 K/UL (ref 1–4.8)
LYMPHOCYTES NFR BLD: 12.5 % (ref 22–41)
MCH RBC QN AUTO: 30.3 PG (ref 27–33)
MCHC RBC AUTO-ENTMCNC: 33.4 G/DL (ref 33.6–35)
MCV RBC AUTO: 90.5 FL (ref 81.4–97.8)
MONOCYTES # BLD AUTO: 0.05 K/UL (ref 0–0.85)
MONOCYTES NFR BLD AUTO: 0.6 % (ref 0–13.4)
NEUTROPHILS # BLD AUTO: 6.94 K/UL (ref 2–7.15)
NEUTROPHILS NFR BLD: 85.8 % (ref 44–72)
NRBC # BLD AUTO: 0 K/UL
NRBC BLD-RTO: 0 /100 WBC
PLATELET # BLD AUTO: 189 K/UL (ref 164–446)
PMV BLD AUTO: 11.3 FL (ref 9–12.9)
POTASSIUM SERPL-SCNC: 4.3 MMOL/L (ref 3.6–5.5)
PROT SERPL-MCNC: 6.7 G/DL (ref 6–8.2)
RBC # BLD AUTO: 4.76 M/UL (ref 4.2–5.4)
SODIUM SERPL-SCNC: 135 MMOL/L (ref 135–145)
WBC # BLD AUTO: 8.1 K/UL (ref 4.8–10.8)

## 2019-05-28 PROCEDURE — 36415 COLL VENOUS BLD VENIPUNCTURE: CPT

## 2019-05-28 PROCEDURE — 99233 SBSQ HOSP IP/OBS HIGH 50: CPT | Performed by: HOSPITALIST

## 2019-05-28 PROCEDURE — 97165 OT EVAL LOW COMPLEX 30 MIN: CPT

## 2019-05-28 PROCEDURE — A9270 NON-COVERED ITEM OR SERVICE: HCPCS | Performed by: HOSPITALIST

## 2019-05-28 PROCEDURE — 96372 THER/PROPH/DIAG INJ SC/IM: CPT

## 2019-05-28 PROCEDURE — 80053 COMPREHEN METABOLIC PANEL: CPT

## 2019-05-28 PROCEDURE — 85025 COMPLETE CBC W/AUTO DIFF WBC: CPT

## 2019-05-28 PROCEDURE — 96366 THER/PROPH/DIAG IV INF ADDON: CPT

## 2019-05-28 PROCEDURE — G0378 HOSPITAL OBSERVATION PER HR: HCPCS

## 2019-05-28 PROCEDURE — 700105 HCHG RX REV CODE 258: Performed by: HOSPITALIST

## 2019-05-28 PROCEDURE — 700111 HCHG RX REV CODE 636 W/ 250 OVERRIDE (IP): Performed by: HOSPITALIST

## 2019-05-28 PROCEDURE — 770006 HCHG ROOM/CARE - MED/SURG/GYN SEMI*

## 2019-05-28 PROCEDURE — 700102 HCHG RX REV CODE 250 W/ 637 OVERRIDE(OP): Performed by: HOSPITALIST

## 2019-05-28 PROCEDURE — 82962 GLUCOSE BLOOD TEST: CPT | Mod: 91

## 2019-05-28 RX ORDER — SODIUM BICARBONATE 325 MG/1
325 TABLET ORAL DAILY
Status: ON HOLD | COMMUNITY
End: 2019-05-30

## 2019-05-28 RX ADMIN — ZIPRASIDONE HYDROCHLORIDE 80 MG: 80 CAPSULE ORAL at 00:19

## 2019-05-28 RX ADMIN — SENNOSIDES, DOCUSATE SODIUM 2 TABLET: 50; 8.6 TABLET, FILM COATED ORAL at 06:26

## 2019-05-28 RX ADMIN — SODIUM CHLORIDE 1000 MG: 9 INJECTION, SOLUTION INTRAVENOUS at 17:21

## 2019-05-28 RX ADMIN — BUSPIRONE HYDROCHLORIDE 5 MG: 5 TABLET ORAL at 23:36

## 2019-05-28 RX ADMIN — ASPIRIN 81 MG: 81 TABLET, DELAYED RELEASE ORAL at 06:26

## 2019-05-28 RX ADMIN — BUSPIRONE HYDROCHLORIDE 5 MG: 5 TABLET ORAL at 00:17

## 2019-05-28 RX ADMIN — FLUOXETINE 10 MG: 10 CAPSULE ORAL at 06:27

## 2019-05-28 RX ADMIN — INSULIN HUMAN 1 UNITS: 100 INJECTION, SOLUTION PARENTERAL at 23:49

## 2019-05-28 RX ADMIN — SENNOSIDES, DOCUSATE SODIUM 2 TABLET: 50; 8.6 TABLET, FILM COATED ORAL at 17:21

## 2019-05-28 RX ADMIN — FUROSEMIDE 80 MG: 40 TABLET ORAL at 06:27

## 2019-05-28 RX ADMIN — OXYCODONE HYDROCHLORIDE 10 MG: 10 TABLET ORAL at 16:10

## 2019-05-28 RX ADMIN — BUSPIRONE HYDROCHLORIDE 5 MG: 5 TABLET ORAL at 11:57

## 2019-05-28 RX ADMIN — METHOCARBAMOL TABLETS 750 MG: 750 TABLET, COATED ORAL at 17:21

## 2019-05-28 RX ADMIN — PREGABALIN 150 MG: 75 CAPSULE ORAL at 06:27

## 2019-05-28 RX ADMIN — OXYCODONE HYDROCHLORIDE 5 MG: 5 TABLET ORAL at 08:06

## 2019-05-28 RX ADMIN — OXYCODONE HYDROCHLORIDE 10 MG: 10 TABLET ORAL at 21:25

## 2019-05-28 RX ADMIN — OXYCODONE HYDROCHLORIDE 10 MG: 10 TABLET ORAL at 11:57

## 2019-05-28 RX ADMIN — ONDANSETRON 4 MG: 4 TABLET, ORALLY DISINTEGRATING ORAL at 18:59

## 2019-05-28 RX ADMIN — METHOCARBAMOL TABLETS 750 MG: 750 TABLET, COATED ORAL at 06:27

## 2019-05-28 RX ADMIN — OXYCODONE HYDROCHLORIDE 10 MG: 10 TABLET ORAL at 00:20

## 2019-05-28 RX ADMIN — ONDANSETRON 4 MG: 4 TABLET, ORALLY DISINTEGRATING ORAL at 14:25

## 2019-05-28 RX ADMIN — TRAZODONE HYDROCHLORIDE 100 MG: 100 TABLET ORAL at 21:00

## 2019-05-28 RX ADMIN — ZIPRASIDONE HYDROCHLORIDE 80 MG: 80 CAPSULE ORAL at 06:26

## 2019-05-28 RX ADMIN — INSULIN HUMAN 1 UNITS: 100 INJECTION, SOLUTION PARENTERAL at 06:31

## 2019-05-28 RX ADMIN — METHOCARBAMOL TABLETS 750 MG: 750 TABLET, COATED ORAL at 12:00

## 2019-05-28 RX ADMIN — ZIPRASIDONE HYDROCHLORIDE 80 MG: 80 CAPSULE ORAL at 17:21

## 2019-05-28 RX ADMIN — PREGABALIN 150 MG: 75 CAPSULE ORAL at 17:21

## 2019-05-28 ASSESSMENT — ENCOUNTER SYMPTOMS
MYALGIAS: 0
HEMOPTYSIS: 0
STRIDOR: 0
SPUTUM PRODUCTION: 0
LOSS OF CONSCIOUSNESS: 0
COUGH: 0
EYE REDNESS: 0
BRUISES/BLEEDS EASILY: 0
FEVER: 0
BLURRED VISION: 1
EYE DISCHARGE: 0
SEIZURES: 0
BLOOD IN STOOL: 0
DOUBLE VISION: 1
HALLUCINATIONS: 0
FOCAL WEAKNESS: 0
NERVOUS/ANXIOUS: 0
SPEECH CHANGE: 0
ABDOMINAL PAIN: 0
SHORTNESS OF BREATH: 0
EYE PAIN: 1
VOMITING: 0
PALPITATIONS: 0
FLANK PAIN: 0
DIARRHEA: 0
SORE THROAT: 0
CHILLS: 0

## 2019-05-28 ASSESSMENT — COGNITIVE AND FUNCTIONAL STATUS - GENERAL
SUGGESTED CMS G CODE MODIFIER DAILY ACTIVITY: CJ
TOILETING: A LITTLE
DAILY ACTIVITIY SCORE: 22
HELP NEEDED FOR BATHING: A LITTLE

## 2019-05-28 ASSESSMENT — ACTIVITIES OF DAILY LIVING (ADL): TOILETING: REQUIRES ASSIST

## 2019-05-28 NOTE — PROGRESS NOTES
Patient c/o headache, body aches eye is hurting. Pain 10/10. Medication given. Ice pack applied to her head. WCTM

## 2019-05-28 NOTE — ASSESSMENT & PLAN NOTE
Glycated hemoglobin 6.1%   Short acting insulin for now.  Expect blood sugars to increase with steroids  Accu-Checks, hypoglycemia protocol

## 2019-05-28 NOTE — ED TRIAGE NOTES
"Chief Complaint   Patient presents with   • Eye Pain     x2.5 weeks - L eye \"severe loss of vision and pain\"     Patient ambulatory to triage with personal FWW, grandmother at patient side.  Patient reports seen on 5/16/19 by Dr. Luis Felipe Yousif, neuro-opthamology, \"requested CT scan, steroids and update on condition\".    Explained wait time and triage process to pt. Pt placed back out in lobby, told to notify ED tech or triage RN of any changes, verbalized understanding.    "

## 2019-05-28 NOTE — PROGRESS NOTES
"2 RN's at bedside for skin assessment.   Pt presents with bilateral ulcerations to breast. Pt states, \"I have these all the times\". Ulcerations open to air.  Bilateral heels dry and flaky.  Sacrum normal color and blanching.  No other issues noted at this time.  "

## 2019-05-28 NOTE — THERAPY
"Occupational Therapy Evaluation completed.   Functional Status:  Pt extremely pleasant & well versed in her medical conditions as she has long standing impairments.  Pt was supervised for bed mobility.  Pt reports she has a LH reacher & sock aid for home use.  Pt states her grandmother lives with her & assists as needed.  Pt amb with 4WW to bathroom with supervision.  Pt was supervised for toilet transfer.  Pt provided with alternative strategies for personal hygiene after toileting.  Due to pt's body habitus & prior back sx it's very difficult for pt to reach her bottom after toileting. Pt does have some visual changes due to her recent optical neuritis, but she is able to compensate well, & is able to perform basic ADL's without additional difficulties.  Pt does not drive.   Pt encouraged to be OOB for meals & amb with staff in halls while in hospital.  Plan of Care: Patient with no further skilled OT needs in the acute care setting at this time  Discharge Recommendations:  Equipment: Toilet tissue aide . Post-acute therapy Discharge to home with outpatient or home health for additional skilled therapy services.    See \"Rehab Therapy-Acute\" Patient Summary Report for complete documentation.    "

## 2019-05-28 NOTE — PROGRESS NOTES
Hospital Medicine Daily Progress Note    Date of Service  5/28/2019    Chief Complaint  Loss of vision    Hospital Course      28 y.o. female with past medical history of diabetes, atrial fibrillation, chronic back pain, migraines admitted 5/27/2019 with optic neuritis she gets intravenous steroid after being seen by Dr Sousa.        Interval Problem Update  Slight improvement in night pain.  Still having blurred and double vision.  Continue pulse high-dose steroids.  Watch for hypertension and hyperglycemia carefully.  Blood sugars running 132-163.  Heart rate 70s-90s.  Systolic blood pressure 120s-130s over diastolic blood pressure of 50s-70s.  I personally reviewed CT had a CT orbit sella reports, no acute infarctions or hemorrhage  Discussed with patient, patient's nurse and with multidisciplinary team during rounds including , pharmacist and charge nurse.      Consultants/Specialty  Dr. Newton Neuro optho     Code Status  FULL    Disposition  TBD     Review of Systems  Review of Systems   Constitutional: Negative for chills and fever.   HENT: Negative for congestion and sore throat.    Eyes: Positive for blurred vision, double vision and pain (Slight improvement). Negative for discharge and redness.        Reduced vision in left eye    Respiratory: Negative for cough, hemoptysis, sputum production, shortness of breath and stridor.    Cardiovascular: Negative for chest pain, palpitations and leg swelling.   Gastrointestinal: Negative for abdominal pain, blood in stool, diarrhea and vomiting.   Genitourinary: Negative for flank pain, hematuria and urgency.   Musculoskeletal: Negative for myalgias.   Skin: Negative.    Neurological: Negative for speech change, focal weakness, seizures and loss of consciousness.   Endo/Heme/Allergies: Does not bruise/bleed easily.   Psychiatric/Behavioral: Negative for hallucinations and suicidal ideas. The patient is not nervous/anxious.         Physical  Exam  Temp:  [36.1 °C (97 °F)-37.3 °C (99.2 °F)] 36.7 °C (98.1 °F)  Pulse:  [70-94] 78  Resp:  [18-20] 18  BP: (123-145)/(56-92) 137/72  SpO2:  [92 %-95 %] 92 %    Physical Exam   Constitutional: She is oriented to person, place, and time. She appears well-developed and well-nourished. No distress.   HENT:   Head: Normocephalic and atraumatic.   Right Ear: External ear normal.   Left Ear: External ear normal.   Eyes: Pupils are equal, round, and reactive to light. Conjunctivae are normal. Right eye exhibits no discharge. Left eye exhibits no discharge.   Reduced vision in left eye    Neck: Normal range of motion. Neck supple. No JVD present. No tracheal deviation present. No thyromegaly present.   Cardiovascular: Exam reveals no gallop and no friction rub.    No murmur heard.  Pulmonary/Chest: Effort normal and breath sounds normal. No stridor. No respiratory distress. She has no wheezes. She has no rales. She exhibits no tenderness.   Abdominal: Soft. Bowel sounds are normal. She exhibits no distension. There is no tenderness. There is no rebound and no guarding.   Musculoskeletal: Normal range of motion. She exhibits no edema, tenderness or deformity.   Neurological: She is alert and oriented to person, place, and time. A cranial nerve deficit (Reduced vision in left eye ) is present. Coordination normal.   Reduced vision in left eye    Skin: Skin is warm and dry. No rash noted. She is not diaphoretic. No erythema. No pallor.   Psychiatric: She has a normal mood and affect. Her behavior is normal. Judgment normal.   Nursing note and vitals reviewed.      Fluids    Intake/Output Summary (Last 24 hours) at 05/28/19 1859  Last data filed at 05/28/19 1300   Gross per 24 hour   Intake                0 ml   Output              475 ml   Net             -475 ml       Laboratory  Recent Labs      05/27/19   2115  05/28/19   0232   WBC  8.6  8.1   RBC  4.69  4.76   HEMOGLOBIN  14.2  14.4   HEMATOCRIT  41.9  43.1   MCV  89.3   90.5   MCH  30.3  30.3   MCHC  33.9  33.4*   RDW  41.8  41.2   PLATELETCT  196  189   MPV  11.3  11.3     Recent Labs      05/27/19   2115  05/28/19   0232   SODIUM  139  135   POTASSIUM  4.1  4.3   CHLORIDE  106  104   CO2  23  19*   GLUCOSE  96  168*   BUN  16  14   CREATININE  0.81  0.83   CALCIUM  9.4  9.3                   Imaging  [id number] WITH & W/O   Final Result      1.  CT of the orbits without and with contrast within normal limits.      CT-HEAD WITH & W/O   Final Result      1.  Head CT without and with contrast within normal limits. No evidence of acute cerebral infarction, hemorrhage, mass lesion, or abnormal enhancement.           Assessment/Plan  * Optic neuritis   Assessment & Plan    Left eye optic neuritis Dr. Newton evaluated patient on admission and will follow along in hospital   Check inflammatory markers   Cont with IV steroids 1 gm daily x 3 days  Pain control       Controlled type 2 diabetes mellitus without complication, without long-term current use of insulin ([facility])- (present on admission)   Assessment & Plan    Glycated hemoglobin 6.1%   Short acting insulin for now.  Expect blood sugars to increase with steroids  Accu-Checks, hypoglycemia protocol       Chronic inflammatory arthritis- (present on admission)   Assessment & Plan    History of cont pain control       GERD (gastroesophageal reflux disease)- (present on admission)   Assessment & Plan    Resume home PPI       Morbidly obese (HCC)- (present on admission)   Assessment & Plan    Encouraged weight loss      Peripheral neuropathy (CMS-HCC)- (present on admission)   Assessment & Plan    Pregabalin  Fluoxetine      Depression- (present on admission)   Assessment & Plan    Resume home proazac          VTE prophylaxis: Heparin

## 2019-05-28 NOTE — CARE PLAN
Problem: Pain Management  Goal: Pain level will decrease to patient's comfort goal  Outcome: PROGRESSING AS EXPECTED  Pt reports pain relieved following pain medication.    Problem: Safety  Goal: Will remain free from injury  Outcome: PROGRESSING AS EXPECTED  Pt educated on bed alarm and call light.

## 2019-05-28 NOTE — ASSESSMENT & PLAN NOTE
Left eye optic neuritis Dr. Newton evaluated patient on admission and will follow along in hospital   Check inflammatory markers   Cont with IV steroids 1 gm daily x 3 days   Pain control, still having severe pain  Follow-up with neuro-ophthalmologist

## 2019-05-28 NOTE — ED PROVIDER NOTES
"ED Provider Note    ED Provider Note      Primary care provider: Torres Brody M.D.    CHIEF COMPLAINT  Chief Complaint   Patient presents with   • Eye Pain     x2.5 weeks - L eye \"severe loss of vision and pain\"       HPI  Kristin Balderrama is a 28 y.o. female who presents to the Emergency Department with chief complaint of left eye pain x2 and half weeks.  Patient is been followed by ophthalmologist Dr. Yousif who had suspected possible optic neuritis.  He is a work-up to this point is been fairly unremarkable.  He discussed the patient's symptoms with her prior to arrival and urged her to go to the emergency department for steroids and CAT scans.  Patient reports the pain is getting progressively worse over time she now rates it as 9 out of 10.  She states blurred vision in the affected eye.  No drainage from the eye no pain with extraocular movements no headache no altered mental status no fevers no chills no cough congestion chest pain shortness of breath no other acute symptoms or concerns at this time.      REVIEW OF SYSTEMS  10 systems reviewed and otherwise negative, pertinent positives and negatives listed in the history of present illness.    PAST MEDICAL HISTORY   has a past medical history of Abdominal pain; Anginal syndrome; Apnea, sleep; Arrhythmia; Arthritis; ASTHMA; Atrial fibrillation ([facility]); Back pain; Borderline personality disorder ([facility]); Breath shortness; Cardiac arrhythmia; Chickenpox; Chronic UTI (9/18/2010); Cough; Daytime sleepiness; Depression; Diabetes (HCC); Diarrhea; Disorder of thyroid; Fall; Fatigue; Frequent headaches; Gasping for breath; Gynecological disorder; Headache(784.0); Heart burn; History of falling; Hypertension; Migraine; 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[facility]); 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[facility]); Nausea; Obesity; Pain (08-15-12); Painful joint; PCOS (polycystic ovarian syndrome); Pneumonia (2012); Psychosis ([facility]); Renal disorder; Ringing in ears; Scoliosis; Shortness of " breath; Sinus tachycardia (10/31/2013); Sleep apnea; Snoring; Tonsillitis; Tuberculosis; Urinary bladder disorder; Urinary incontinence; Weakness; and Wears glasses.    SURGICAL HISTORY   has a past surgical history that includes neuro dest facet l/s w/ig sngl (4/21/2015); recovery (1/27/2016); delmar by laparoscopy (8/29/2010); lumbar fusion anterior (8/21/2012); other cardiac surgery (1/2016); tonsillectomy; bowel stimulator insertion (7/13/2016); gastroscopy with balloon dilatation (N/A, 1/4/2017); muscle biopsy (Right, 1/26/2017); other abdominal surgery; and laminotomy.    SOCIAL HISTORY  Social History   Substance Use Topics   • Smoking status: Never Smoker   • Smokeless tobacco: Never Used   • Alcohol use No      History   Drug Use   • Frequency: 7.0 times per week   • Types: Marijuana, Oral     Comment: Medicinal edible's       FAMILY HISTORY  Non-Contributory    CURRENT MEDICATIONS  Home Medications     Reviewed by Allison Trevizo R.N. (Registered Nurse) on 05/27/19 at 1923  Med List Status: Complete   Medication Last Dose Status   albuterol 108 (90 Base) MCG/ACT Aero Soln inhalation aerosol prn Active   aspirin EC (ECOTRIN) 81 MG Tablet Delayed Response 5/27/2019 Active   busPIRone (BUSPAR) 5 MG tablet 5/27/2019 Active   cholestyramine (QUESTRAN,PREVALITE) 4 GM Pack prn Active   cyanocobalamin ([employer] VITAMIN B-12) 500 MCG tablet not taking Active   DEBLITANE 0.35 MG tablet 5/27/2019 Active   Diclofenac Sodium (VOLTAREN) 1 % Gel 5/27/2019 Active   Diphenhydramine-APAP, sleep, (TYLENOL PM EXTRA STRENGTH PO) 5/26/2019 Active   FLUoxetine (PROZAC) 10 MG Cap 5/26/2019 Active   furosemide (LASIX) 40 MG Tab 5/27/2019 Active   Ivabradine HCl (CORLANOR) 7.5 MG Tab 5/27/2019 Active   lactulose 10 GM/15ML Solution prn Active   lidocaine (LIDODERM) 5 % Patch prn Active   LYRICA 300 MG capsule 5/27/2019 Active   Melatonin 10 MG Cap 5/26/2019 Active   methocarbamol (ROBAXIN) 750 MG Tab 5/27/2019 Active   mupirocin  "calcium (BACTROBAN) 2 % Cream prn Active   nystatin (MYCOSTATIN) 470490 UNIT/GM Cream topical cream prn Active   omeprazole (PRILOSEC) 20 MG delayed-release capsule prn Active   ondansetron (ZOFRAN ODT) 4 MG TABLET DISPERSIBLE prn Active   promethazine (PHENERGAN) 25 MG Tab prn Active   sodium bicarbonate (SODIUM BICARBONATE) 650 MG Tab 5/27/2019 Active   traZODone (DESYREL) 100 MG Tab 5/26/2019 Active   vitamin D, Ergocalciferol, (DRISDOL) 47113 units Cap capsule 5/24/2019 Active   ziprasidone (GEODON) 80 MG Cap 5/27/2019 Active                ALLERGIES  Allergies   Allergen Reactions   • Cefdinir Shortness of Breath and Itching     Tolerated 1/18/17  Tolerates ceftriaxone    • Depakote [Divalproex Sodium] Unspecified     Muscle spasms/muscle pain and weakness     • Doxycycline Anaphylaxis and Vomiting     RXN=unknown   • Abilify Unspecified     Headaches/muscle twitching     • Amitriptyline Unspecified     Headaches     • Amoxicillin Rash     Pt states \"I get a rash\".     • Ciprofloxacin Rash     Pt states \"I get a rash\".     • Clindamycin Nausea     Even with food     • Ees [Erythromycin] Vomiting and Nausea   • Flagyl [Metronidazole Hcl] Unspecified     \"eye problems\"     • Flomax [Tamsulosin Hydrochloride] Swelling   • Metformin Unspecified     Increased lactic acid      • Sulfa Drugs Hives and Rash     RXN=since childhood  PATIENT DID FINE WITH BACTRIM X 2 COURSES 10/2018   • Tape Rash     Tears skin off   coban with Tegaderm tape ok  RXN=ongoing   • Vancomycin Itching     Pt becomes flushed in face and chest.   RXN=7/10/16   • Wound Dressing Adhesive Hives     By pt report   • Cephalexin [Keflex] Rash     Pt states she gets a rash with this medication  Tolerates ceftriaxone   • Levofloxacin Unspecified     Leg muscle cramps   • Metronidazole Rash     .   • Valproic Acid Rash     .       PHYSICAL EXAM  VITAL SIGNS: /92   Pulse 72   Temp 37.3 °C (99.2 °F) (Temporal)   Resp 18   Ht 1.651 m (5' 5\")   " Wt 124.4 kg (274 lb 4 oz)   LMP 05/27/2019   SpO2 95%   Breastfeeding? No   BMI 45.64 kg/m²    Pulse ox interpretation: I interpret this pulse ox as normal.  Constitutional: Alert and oriented x 3, Distress  HEENT: Atraumatic normocephalic, pupils are equal round reactive to light extraocular movements are intact minimal papillary conjunctival erythema bilaterally.. The nares is clear, external ears are normal, mouth shows moist mucous membranes  Neck: Supple, no JVD no tracheal deviation  Cardiovascular: Regular rate and rhythm no murmur rub or gallop 2+ pulses peripherally x4  Thorax & Lungs: No respiratory distress, no wheezes rales or rhonchi, No chest tenderness.   GI: Soft nontender nondistended positive bowel sounds, no peritoneal signs, obese  Skin: Warm dry no acute rash or lesion  Musculoskeletal: Moving all extremities with full range and 5 of 5 strength, no acute  deformity  Neurologic: Cranial nerves III through XII are grossly intact, no sensory deficit, no cerebellar dysfunction   Psychiatric: Appropriate affect for situation at this time      DIAGNOSTIC STUDIES / PROCEDURES  LABS      Results for orders placed or performed during the hospital encounter of 05/27/19   CRP QUANTITIVE (NON-CARDIAC)   Result Value Ref Range    Stat C-Reactive Protein 0.78 (H) 0.00 - 0.75 mg/dL   WESTERGREN SED RATE   Result Value Ref Range    Sed Rate Westergren 9 0 - 20 mm/hour   CBC WITH DIFFERENTIAL   Result Value Ref Range    WBC 8.6 4.8 - 10.8 K/uL    RBC 4.69 4.20 - 5.40 M/uL    Hemoglobin 14.2 12.0 - 16.0 g/dL    Hematocrit 41.9 37.0 - 47.0 %    MCV 89.3 81.4 - 97.8 fL    MCH 30.3 27.0 - 33.0 pg    MCHC 33.9 33.6 - 35.0 g/dL    RDW 41.8 35.9 - 50.0 fL    Platelet Count 196 164 - 446 K/uL    MPV 11.3 9.0 - 12.9 fL    Neutrophils-Polys 55.10 44.00 - 72.00 %    Lymphocytes 36.50 22.00 - 41.00 %    Monocytes 5.90 0.00 - 13.40 %    Eosinophils 1.70 0.00 - 6.90 %    Basophils 0.60 0.00 - 1.80 %    Immature  Granulocytes 0.20 0.00 - 0.90 %    Nucleated RBC 0.00 /100 WBC    Neutrophils (Absolute) 4.74 2.00 - 7.15 K/uL    Lymphs (Absolute) 3.15 1.00 - 4.80 K/uL    Monos (Absolute) 0.51 0.00 - 0.85 K/uL    Eos (Absolute) 0.15 0.00 - 0.51 K/uL    Baso (Absolute) 0.05 0.00 - 0.12 K/uL    Immature Granulocytes (abs) 0.02 0.00 - 0.11 K/uL    NRBC (Absolute) 0.00 K/uL   BASIC METABOLIC PANEL   Result Value Ref Range    Sodium 139 135 - 145 mmol/L    Potassium 4.1 3.6 - 5.5 mmol/L    Chloride 106 96 - 112 mmol/L    Co2 23 20 - 33 mmol/L    Glucose 96 65 - 99 mg/dL    Bun 16 8 - 22 mg/dL    Creatinine 0.81 0.50 - 1.40 mg/dL    Calcium 9.4 8.5 - 10.5 mg/dL    Anion Gap 10.0 0.0 - 11.9   PROCALCITONIN   Result Value Ref Range    Procalcitonin <0.05 <0.25 ng/mL   ESTIMATED GFR   Result Value Ref Range    GFR If African American >60 >60 mL/min/1.73 m 2    GFR If Non African American >60 >60 mL/min/1.73 m 2   ACCU-CHEK GLUCOSE   Result Value Ref Range    Glucose - Accu-Ck 101 (H) 65 - 99 mg/dL     *Note: Due to a large number of results and/or encounters for the requested time period, some results have not been displayed. A complete set of results can be found in Results Review.       All labs reviewed by me.      RADIOLOGY  [id number] WITH & W/O   Final Result      1.  CT of the orbits without and with contrast within normal limits.      CT-HEAD WITH & W/O   Final Result      1.  Head CT without and with contrast within normal limits. No evidence of acute cerebral infarction, hemorrhage, mass lesion, or abnormal enhancement.        The radiologist's interpretation of all radiological studies have been reviewed by me.    COURSE & MEDICAL DECISION MAKING  Pertinent Labs & Imaging studies reviewed. (See chart for details)    8:16 PM - Patient seen and examined at bedside.         Patient noted to have slightly elevated blood pressure likely circumstantial secondary to presenting complaint. Referred to primary care physician for  "further evaluation.        Medical Decision Making:   CAT scans and labs ordered and ordered 1 g of Solu-Medrol.  Patient given half a milligram Dilaudid feeling much better pressure in the affected eye is 17mmHg.  CAT scans unremarkable as above will be admitted to the hospitalist service for ongoing treatment per request from ophthalmology to receive 3-day course of IV steroids.  Admitted in guarded condition  /92   Pulse 72   Temp 37.3 °C (99.2 °F) (Temporal)   Resp 18   Ht 1.651 m (5' 5\")   Wt 124.4 kg (274 lb 4 oz)   LMP 05/27/2019   SpO2 95%   Breastfeeding? No   BMI 45.64 kg/m²             FINAL IMPRESSION  1.  Left eye pain  2.  Optic neuritis      This dictation has been created using voice recognition software and/or scribes. The accuracy of the dictation is limited by the abilities of the software and the expertise of the scribes. I expect there may be some errors of grammar and possibly content. I made every attempt to manually correct the errors within my dictation. However, errors related to voice recognition software and/or scribes may still exist and should be interpreted within the appropriate context.            "

## 2019-05-28 NOTE — ED NOTES
Med rec update and complete.  Allergies reviewed. Met with pt at bedside and dicussed   Current medications and last doses taken.  Removed all medications that pt stated she is no longer taking.  Pt stated that some of her night medications she takes in the afternoon  Because that is when her cat allows her to sleep.

## 2019-05-28 NOTE — DIETARY
NUTRITION SERVICES: BMI - Pt with BMI >40 (=Body mass index is 45.53 kg/m².), class III (extreme) obesity. Weight loss counseling not appropriate in acute care setting. RECOMMEND - Referral to outpatient nutrition services for weight management after D/C.

## 2019-05-28 NOTE — THERAPY
"Attempted PT eval. Pt reporting she doesn't feel well, has a headache and \"all the muscles in my body are seizing up.\" RN notified. Will re-attempt eval tomorrow as able and appropriate.  "

## 2019-05-28 NOTE — H&P
Hospital Medicine History & Physical Note    Date of Service  5/27/2019    Primary Care Physician  Torres Brody M.D.    Consultants  Dr. Newton Neuro optho     Code Status  full    Chief Complaint  Visual changes     History of Presenting Illness  28 y.o. female who presented 5/27/2019 with applicator past medical history including mitochondrial disorder, diabetes, A. fib, chronic back pain, arthritis hemiplegic migraines, asthma, TONYA, anxiety, depression who presents with left eye visual changes.  This patient states she has had acute onset left eye visual changes.  She states that she has lost vision in her left eye.  She is also had pain with any eye movement.  She is also seen Dr. Van araya neuro-ophthalmologist who recommended IV steroids and CT scans and will follow here in the hospital.  She will be admitted to the hospital with optic neuritis.  No known alleviating or exacerbating factors to her symptoms.    Review of Systems  Review of Systems   Constitutional: Negative for chills and fever.   HENT: Negative for congestion, hearing loss and tinnitus.    Eyes: Positive for blurred vision, double vision and pain. Negative for discharge.   Respiratory: Negative for cough, hemoptysis and shortness of breath.    Cardiovascular: Negative for chest pain, palpitations and leg swelling.   Gastrointestinal: Negative for abdominal pain, heartburn, nausea and vomiting.   Genitourinary: Negative for dysuria and flank pain.   Musculoskeletal: Negative for joint pain and myalgias.   Skin: Negative for rash.   Neurological: Positive for weakness. Negative for dizziness, sensory change, speech change and focal weakness.   Endo/Heme/Allergies: Negative for environmental allergies. Does not bruise/bleed easily.   Psychiatric/Behavioral: Negative for depression, hallucinations and substance abuse.       Past Medical History   has a past medical history of Abdominal pain; Anginal syndrome; Apnea, sleep; Arrhythmia;  Arthritis; ASTHMA; Atrial fibrillation ([facility]); Back pain; Borderline personality disorder ([facility]); Breath shortness; Cardiac arrhythmia; Chickenpox; Chronic UTI (9/18/2010); Cough; Daytime sleepiness; Depression; Diabetes ([facility]); Diarrhea; Disorder of thyroid; Fall; Fatigue; Frequent headaches; Gasping for breath; Gynecological disorder; Headache(784.0); Heart burn; History of falling; Hypertension; Migraine; 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[facility]); 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[facility]); Nausea; Obesity; Pain (08-15-12); Painful joint; PCOS (polycystic ovarian syndrome); Pneumonia (2012); Psychosis ([facility]); Renal disorder; Ringing in ears; Scoliosis; Shortness of breath; Sinus tachycardia (10/31/2013); Sleep apnea; Snoring; Tonsillitis; Tuberculosis; Urinary bladder disorder; Urinary incontinence; Weakness; and Wears glasses.    Surgical History   has a past surgical history that includes neuro dest facet l/s w/ig sngl (4/21/2015); recovery (1/27/2016); delmar by laparoscopy (8/29/2010); lumbar fusion anterior (8/21/2012); other cardiac surgery (1/2016); tonsillectomy; bowel stimulator insertion (7/13/2016); gastroscopy with balloon dilatation (N/A, 1/4/2017); muscle biopsy (Right, 1/26/2017); other abdominal surgery; and laminotomy.     Family History  family history includes Genitourinary () in her sister; Heart Disease in her maternal grandmother and mother; Hypertension in her maternal grandmother, maternal uncle, and mother; No Known Problems in her sister; Other in her mother and sister; Sleep Apnea in her mother.     Social History   reports that she has never smoked. She has never used smokeless tobacco. She reports that she uses drugs, including Marijuana and Oral, about 7 times per week. She reports that she does not drink alcohol.    Allergies  Allergies   Allergen Reactions   • Cefdinir Shortness of Breath and Itching     Tolerated 1/18/17  Tolerates ceftriaxone    • Depakote [Divalproex Sodium] Unspecified     Muscle  "spasms/muscle pain and weakness     • Doxycycline Anaphylaxis and Vomiting     RXN=unknown   • Abilify Unspecified     Headaches/muscle twitching     • Amitriptyline Unspecified     Headaches     • Amoxicillin Rash     Pt states \"I get a rash\".     • Ciprofloxacin Rash     Pt states \"I get a rash\".     • Clindamycin Nausea     Even with food     • Ees [Erythromycin] Vomiting and Nausea   • Flagyl [Metronidazole Hcl] Unspecified     \"eye problems\"     • Flomax [Tamsulosin Hydrochloride] Swelling   • Metformin Unspecified     Increased lactic acid      • Sulfa Drugs Hives and Rash     RXN=since childhood  PATIENT DID FINE WITH BACTRIM X 2 COURSES 10/2018   • Tape Rash     Tears skin off   coban with Tegaderm tape ok  RXN=ongoing   • Vancomycin Itching     Pt becomes flushed in face and chest.   RXN=7/10/16   • Wound Dressing Adhesive Hives     By pt report   • Cephalexin [Keflex] Rash     Pt states she gets a rash with this medication  Tolerates ceftriaxone   • Levofloxacin Unspecified     Leg muscle cramps   • Metronidazole Rash     .   • Valproic Acid Rash     .       Medications  Prior to Admission Medications   Prescriptions Last Dose Informant Patient Reported? Taking?   DEBLITANE 0.35 MG tablet 2019 at Unknown time  Yes No   Diclofenac Sodium (VOLTAREN) 1 % Gel 2019 at Unknown time  No No   Sig: Apply 1 Inch to skin as directed 4 times a day.   Diphenhydramine-APAP, sleep, (TYLENOL PM EXTRA STRENGTH PO) 2019 at Unknown time  Yes No   Sig: Take  by mouth.   FLUoxetine (PROZAC) 10 MG Cap 2019 at Unknown time  No No   Sig: TAKE ONE CAPSULE BY MOUTH ONCE A DAY   Ivabradine HCl (CORLANOR) 7.5 MG Tab 2019 at Unknown time  Yes Yes   Sig: Take 7.5 mg by mouth.   LYRICA 300 MG capsule 2019 at Unknown time  Yes No   Si times a day.   Melatonin 10 MG Cap 2019 at Unknown time MAR from Other Facility Yes No   Sig: Take 10 mg by mouth every bedtime.   albuterol 108 (90 Base) MCG/ACT Aero " Soln inhalation aerosol prn  No No   Sig: Inhale 2 Puffs by mouth every 6 hours as needed.   aspirin EC (ECOTRIN) 81 MG Tablet Delayed Response 5/27/2019 at Unknown time MAR from Other Facility No No   Sig: Take 1 Tab by mouth every day.   busPIRone (BUSPAR) 5 MG tablet 5/27/2019 at Unknown time  No No   Sig: Take 1 Tab by mouth 2 times a day.   cholestyramine (QUESTRAN,PREVALITE) 4 GM Pack prn MAR from Other Facility Yes No   Sig: Take 4 g by mouth every morning as needed.   cyanocobalamin ([employer] VITAMIN B-12) 500 MCG tablet not taking MAR from Other Facility Yes No   Sig: Take 500 mcg by mouth every day.   furosemide (LASIX) 40 MG Tab 5/27/2019 at Unknown time MAR from Other Facility Yes No   Sig: Take 80 mg by mouth every day.   lactulose 10 GM/15ML Solution prn  Yes No   Sig: Take 10 g by mouth 2 times a day as needed.   lidocaine (LIDODERM) 5 % Patch prn  No No   Sig: Apply 1 Patch to skin as directed every 24 hours.   methocarbamol (ROBAXIN) 750 MG Tab 5/27/2019 at Unknown time  Yes No   Sig: Take 750 mg by mouth 3 times a day.   mupirocin calcium (BACTROBAN) 2 % Cream prn  No No   Sig: Apply 1 g to affected area(s) 2 times a day.   nystatin (MYCOSTATIN) 296174 UNIT/GM Cream topical cream prn  No No   Sig: Apply 0.0001 g to affected area(s) 2 times a day.   omeprazole (PRILOSEC) 20 MG delayed-release capsule prn  No No   Sig: Take 1 Cap by mouth every day.   ondansetron (ZOFRAN ODT) 4 MG TABLET DISPERSIBLE prn  No No   Sig: Take 1 Tab by mouth every 8 hours as needed.   promethazine (PHENERGAN) 25 MG Tab prn  Yes Yes   Sig: Take 25 mg by mouth every 6 hours as needed for Nausea/Vomiting.   sodium bicarbonate (SODIUM BICARBONATE) 650 MG Tab 5/27/2019 at Unknown time  No No   Sig: Take 1 Tab by mouth 2 times a day.   traZODone (DESYREL) 100 MG Tab 5/26/2019 at Unknown time  No No   Sig: TAKE  ONE TABLET BY MOUTH NIGHTLY AT BEDTIME   vitamin D, Ergocalciferol, (DRISDOL) 07762 units Cap capsule 5/24/2019  Yes Yes    Sig: Take 50,000 Units by mouth every 7 days.   ziprasidone (GEODON) 80 MG Cap 5/27/2019 at Unknown time  No No   Sig: TAKE TWO CAPSULES BY MOUTH DAILY AT BEDTIME      Facility-Administered Medications: None       Physical Exam  Temp:  [37.3 °C (99.2 °F)] 37.3 °C (99.2 °F)  Pulse:  [72] 72  Resp:  [18] 18  BP: (145)/(92) 145/92  SpO2:  [95 %] 95 %    Physical Exam   Constitutional: She is oriented to person, place, and time. She appears well-developed and well-nourished. She appears distressed.   HENT:   Head: Normocephalic and atraumatic.   Eyes: Pupils are equal, round, and reactive to light. Conjunctivae and EOM are normal.   Decreased visual accuity left eye   Neck: Normal range of motion. Neck supple. No JVD present.   Cardiovascular: Normal rate, regular rhythm, normal heart sounds and intact distal pulses.    No murmur heard.  Pulmonary/Chest: Effort normal and breath sounds normal. No respiratory distress. She has no wheezes.   Abdominal: Soft. Bowel sounds are normal. She exhibits no distension. There is no tenderness.   Musculoskeletal: Normal range of motion. She exhibits no edema.   Right arm splint   Neurological: She is alert and oriented to person, place, and time. She exhibits normal muscle tone.   Skin: Skin is warm and dry.   Psychiatric: She has a normal mood and affect. Her behavior is normal. Judgment and thought content normal.   Nursing note and vitals reviewed.      Laboratory:          No results for input(s): ALTSGPT, ASTSGOT, ALKPHOSPHAT, TBILIRUBIN, DBILIRUBIN, GAMMAGT, AMYLASE, LIPASE, ALB, PREALBUMIN, GLUCOSE in the last 72 hours.              No results for input(s): TROPONINI in the last 72 hours.    Urinalysis:    No results found     Imaging:  CT-HEAD WITH & W/O    (Results Pending)   [id number] WITH & W/O    (Results Pending)         Assessment/Plan:  I anticipate this patient is appropriate for observation status at this time.    * Optic neuritis   Assessment & Plan    Left  eye optic neuritis Dr. Newton evaluated patient earlier today and will follow along in hospital   Check inflammatory markers  reccomeneded CT scans given inability to get MRI due to implant   Cont with IV steroids 1 gm daily x 3 days  Pain control      Controlled type 2 diabetes mellitus without complication, without long-term current use of insulin (HCC)- (present on admission)   Assessment & Plan    SSI ordered  accu checks      Chronic inflammatory arthritis- (present on admission)   Assessment & Plan    History of cont pain control      GERD (gastroesophageal reflux disease)- (present on admission)   Assessment & Plan    Resume home PPI      Morbidly obese (HCC)- (present on admission)   Assessment & Plan    Encouraged weight loss     Peripheral neuropathy (CMS-HCC)- (present on admission)   Assessment & Plan    resyume home gabapentin      Depression- (present on admission)   Assessment & Plan    Resume home proazac          VTE prophylaxis: heparin

## 2019-05-29 ENCOUNTER — APPOINTMENT (OUTPATIENT)
Dept: PHYSICAL THERAPY | Facility: REHABILITATION | Age: 29
End: 2019-05-29
Attending: INTERNAL MEDICINE
Payer: MEDICARE

## 2019-05-29 PROBLEM — K59.00 CONSTIPATION: Status: ACTIVE | Noted: 2019-05-29

## 2019-05-29 PROBLEM — R53.1 WEAKNESS: Status: ACTIVE | Noted: 2019-05-29

## 2019-05-29 LAB
GLUCOSE BLD-MCNC: 137 MG/DL (ref 65–99)
GLUCOSE BLD-MCNC: 143 MG/DL (ref 65–99)
GLUCOSE BLD-MCNC: 161 MG/DL (ref 65–99)
GLUCOSE BLD-MCNC: 179 MG/DL (ref 65–99)
GLUCOSE BLD-MCNC: 194 MG/DL (ref 65–99)

## 2019-05-29 PROCEDURE — 700111 HCHG RX REV CODE 636 W/ 250 OVERRIDE (IP): Performed by: HOSPITALIST

## 2019-05-29 PROCEDURE — 97162 PT EVAL MOD COMPLEX 30 MIN: CPT

## 2019-05-29 PROCEDURE — A9270 NON-COVERED ITEM OR SERVICE: HCPCS | Performed by: HOSPITALIST

## 2019-05-29 PROCEDURE — 82962 GLUCOSE BLOOD TEST: CPT

## 2019-05-29 PROCEDURE — 700105 HCHG RX REV CODE 258: Performed by: HOSPITALIST

## 2019-05-29 PROCEDURE — 700102 HCHG RX REV CODE 250 W/ 637 OVERRIDE(OP): Performed by: HOSPITALIST

## 2019-05-29 PROCEDURE — 99232 SBSQ HOSP IP/OBS MODERATE 35: CPT | Performed by: HOSPITALIST

## 2019-05-29 PROCEDURE — 770006 HCHG ROOM/CARE - MED/SURG/GYN SEMI*

## 2019-05-29 RX ORDER — SODIUM BICARBONATE 650 MG/1
325 TABLET ORAL DAILY
Status: DISCONTINUED | OUTPATIENT
Start: 2019-05-29 | End: 2019-05-30 | Stop reason: HOSPADM

## 2019-05-29 RX ORDER — LACTULOSE 20 G/30ML
15 SOLUTION ORAL 3 TIMES DAILY
Status: DISCONTINUED | OUTPATIENT
Start: 2019-05-29 | End: 2019-05-30 | Stop reason: HOSPADM

## 2019-05-29 RX ADMIN — METHOCARBAMOL TABLETS 750 MG: 750 TABLET, COATED ORAL at 05:02

## 2019-05-29 RX ADMIN — INSULIN HUMAN 1 UNITS: 100 INJECTION, SOLUTION PARENTERAL at 12:12

## 2019-05-29 RX ADMIN — OXYCODONE HYDROCHLORIDE 10 MG: 10 TABLET ORAL at 12:23

## 2019-05-29 RX ADMIN — SENNOSIDES, DOCUSATE SODIUM 2 TABLET: 50; 8.6 TABLET, FILM COATED ORAL at 05:03

## 2019-05-29 RX ADMIN — PREGABALIN 150 MG: 75 CAPSULE ORAL at 17:50

## 2019-05-29 RX ADMIN — LACTULOSE 15 ML: 20 SOLUTION ORAL at 12:13

## 2019-05-29 RX ADMIN — METHOCARBAMOL TABLETS 750 MG: 750 TABLET, COATED ORAL at 17:49

## 2019-05-29 RX ADMIN — PREGABALIN 150 MG: 75 CAPSULE ORAL at 05:13

## 2019-05-29 RX ADMIN — FLUOXETINE 10 MG: 10 CAPSULE ORAL at 05:02

## 2019-05-29 RX ADMIN — BUSPIRONE HYDROCHLORIDE 5 MG: 5 TABLET ORAL at 12:13

## 2019-05-29 RX ADMIN — BUSPIRONE HYDROCHLORIDE 5 MG: 5 TABLET ORAL at 23:18

## 2019-05-29 RX ADMIN — ASPIRIN 81 MG: 81 TABLET, DELAYED RELEASE ORAL at 05:02

## 2019-05-29 RX ADMIN — METHOCARBAMOL TABLETS 750 MG: 750 TABLET, COATED ORAL at 12:14

## 2019-05-29 RX ADMIN — ZIPRASIDONE HYDROCHLORIDE 80 MG: 80 CAPSULE ORAL at 17:49

## 2019-05-29 RX ADMIN — INSULIN HUMAN 1 UNITS: 100 INJECTION, SOLUTION PARENTERAL at 23:22

## 2019-05-29 RX ADMIN — OXYCODONE HYDROCHLORIDE 5 MG: 5 TABLET ORAL at 19:43

## 2019-05-29 RX ADMIN — LACTULOSE 15 ML: 20 SOLUTION ORAL at 17:48

## 2019-05-29 RX ADMIN — ZIPRASIDONE HYDROCHLORIDE 80 MG: 80 CAPSULE ORAL at 05:02

## 2019-05-29 RX ADMIN — TRAZODONE HYDROCHLORIDE 100 MG: 100 TABLET ORAL at 19:44

## 2019-05-29 RX ADMIN — SODIUM BICARBONATE 325 MG: 650 TABLET ORAL at 12:13

## 2019-05-29 RX ADMIN — FUROSEMIDE 80 MG: 40 TABLET ORAL at 05:03

## 2019-05-29 RX ADMIN — OXYCODONE HYDROCHLORIDE 10 MG: 10 TABLET ORAL at 07:14

## 2019-05-29 RX ADMIN — SODIUM CHLORIDE 1000 MG: 9 INJECTION, SOLUTION INTRAVENOUS at 17:51

## 2019-05-29 ASSESSMENT — ENCOUNTER SYMPTOMS
COUGH: 0
BLURRED VISION: 1
SPEECH CHANGE: 0
NERVOUS/ANXIOUS: 0
SORE THROAT: 0
DOUBLE VISION: 1
HEMOPTYSIS: 0
SHORTNESS OF BREATH: 0
LOSS OF CONSCIOUSNESS: 0
EYE DISCHARGE: 0
HALLUCINATIONS: 0
BLOOD IN STOOL: 0
FLANK PAIN: 0
EYE PAIN: 1
SPUTUM PRODUCTION: 0
FEVER: 0
ABDOMINAL PAIN: 0
SEIZURES: 0
CHILLS: 0
PALPITATIONS: 0
STRIDOR: 0
DIARRHEA: 0
BRUISES/BLEEDS EASILY: 0
VOMITING: 0
FOCAL WEAKNESS: 0
MYALGIAS: 0
EYE REDNESS: 0

## 2019-05-29 ASSESSMENT — COGNITIVE AND FUNCTIONAL STATUS - GENERAL
SUGGESTED CMS G CODE MODIFIER MOBILITY: CI
CLIMB 3 TO 5 STEPS WITH RAILING: A LITTLE
MOBILITY SCORE: 23

## 2019-05-29 ASSESSMENT — GAIT ASSESSMENTS
ASSISTIVE DEVICE: 4 WHEEL WALKER
DISTANCE (FEET): 300
GAIT LEVEL OF ASSIST: SUPERVISED

## 2019-05-29 NOTE — DISCHARGE PLANNING
Anticipated Discharge Disposition:   Home with help from grandparents    Action:    Spoke to patient and she was resting in bed.  She was AAOx4.  She reported that she lives with her grandparents, and aunt and uncle.  Her grandmother assists her with toileting, dressing, bathing.  Patient stated she uses a walker or wheel chair at home.  For shopping she uses a motorized scooter.  She uses [employer] for transportation or her grandmother drives.  She has a physical and mental disability.  She c/o of pain in her left eye 6/10 and stated pain medication relieving discomfort.  She anticipates she will return home and will not need home health.    Barriers to Discharge:    Medical clearance  IV Solumedrol    Plan:    Assist with disposition as needed.    Care Transition Team Assessment    Information Source  Orientation : Oriented x 4  Information Given By: Patient  Informant's Name: Kristin Balderrama  Who is responsible for making decisions for patient? : Patient    Readmission Evaluation  Is this a readmission?: Yes - unplanned readmission    Elopement Risk  Legal Hold: No  Ambulatory or Self Mobile in Wheelchair: Yes  Disoriented: No  Psychiatric Symptoms: None  History of Wandering: No  Elopement this Admit: No  Vocalizing Wanting to Leave: No  Displays Behaviors, Body Language Wanting to Leave: No-Not at Risk for Elopement    Interdisciplinary Discharge Planning  Does Admitting Nurse Feel This Could be a Complex Discharge?: No  Primary Care Physician: Dr. Torres Brody  Lives with - Patient's Self Care Capacity:  (grandparents)  Patient or legal guardian wants to designate a caregiver (see row info): No  Support Systems: Family Member(s)  Housing / Facility: [address]  Do You Take your Prescribed Medications Regularly: Yes  Able to Return to Previous ADL's: Yes  Mobility Issues: Yes  Prior Services: Intermittent Physical Support for ADL Per Family  Patient Expects to be Discharged to:: home  Assistance Needed: No  Durable  Medical Equipment: Walker                   Vision / Hearing Impairment  Vision Impairment : Yes  Right Eye Vision: Wears Glasses  Left Eye Vision: Wears Glasses, Impaired  Hearing Impairment : No         Advance Directive  Advance Directive?: None    Domestic Abuse  Have you ever been the victim of abuse or violence?: No  Physical Abuse or Sexual Abuse: No  Verbal Abuse or Emotional Abuse: No  Possible Abuse Reported to:: Not Applicable         Discharge Risks or Barriers  Discharge risks or barriers?: No    Anticipated Discharge Information  Anticipated discharge disposition: Home  Discharge Address: [address] [place] [address] [place] [id number]  Discharge Contact Phone Number: [phone]

## 2019-05-29 NOTE — CARE PLAN
Problem: Pain Management  Goal: Pain level will decrease to patient's comfort goal  Outcome: PROGRESSING AS EXPECTED      Problem: Safety  Goal: Will remain free from injury  Outcome: PROGRESSING AS EXPECTED      Problem: Urinary Elimination:  Goal: Ability to reestablish a normal urinary elimination pattern will improve  Outcome: PROGRESSING AS EXPECTED

## 2019-05-29 NOTE — DISCHARGE PLANNING
PMR referral from Dr. Casas     Optic neuritis on going medical management, limited therapy need. Current documenttation does not support IRF level of care. Anticipate outpatient follow up when medically cleared. No physiatry consult ordered per protocol.

## 2019-05-29 NOTE — CARE PLAN
Problem: Bowel/Gastric:  Goal: Normal bowel function is maintained or improved  Outcome: PROGRESSING SLOWER THAN EXPECTED  Abdomen soft, normoactive bowel sounds x4. Tolerating diet.    Problem: Urinary Elimination:  Goal: Ability to reestablish a normal urinary elimination pattern will improve  Outcome: PROGRESSING SLOWER THAN EXPECTED  Patient occasionally incontinent of urine. Patient experiences urgency and sometimes cannot tell when she has voided.

## 2019-05-29 NOTE — THERAPY
"Physical Therapy Evaluation completed.   Bed Mobility:  Supine to Sit: Supervised  Transfers: Sit to Stand: Supervised  Gait: Level Of Assist: Supervised with 4-Wheel Walker       Plan of Care: Patient with no further skilled PT needs in the acute care setting at this time  Discharge Recommendations: Equipment: No Equipment Needed. Post-acute therapy Discharge to home with outpatient or home health for additional skilled therapy services.    Pt admitted w/ left optic neuritis.  Hx of mitochondrial disorder.  She lives w/ her grandparents in a single story house w/ ramp access.  She is essentially a household ambulator, using a w/c for the community.  She does report falling when she doesn't use her 4ww, as it doesn't fit in the home.  She reports owning a smaller walker which does fit and PT highly recommend she start using it inside the home.  No other acute PT needs.    See \"Rehab Therapy-Acute\" Patient Summary Report for complete documentation.     "

## 2019-05-29 NOTE — PROGRESS NOTES
Hospital Medicine Daily Progress Note    Date of Service  5/29/2019    Chief Complaint  Loss of vision    Hospital Course      28 y.o. female with past medical history of diabetes, atrial fibrillation, chronic back pain, migraines admitted 5/27/2019 with optic neuritis she gets intravenous steroid after being seen by Dr Sousa.  Patient was started on intravenous steroids.  She had improvement in her vision.  However, less improvement in her pain.  Blood sugars slightly increased reaching up to 194.  Blood pressures within normal limits.  The patient has generalized weakness, she has multiple reasons, obesity, reduced mobility, possible myopathy from frequent steroid courses.  Physical and Occupational Therapy evaluation recommended home health, however **          Interval Problem Update  Heart rate 60s-80s.  Saturating well on room air.  Blood pressure within normal limits.  Patient reports improvement in her visual acuity.  However, still having severe pain.  Continue pulse high-dose steroids.  Watch for hypertension and hyperglycemia carefully.  Blood sugars running 143-194.  Patient has generalized weakness, I am ordering physical and Occupational Therapy evaluation.  The patient is constipated.  I am starting lactulose.  Encourage mobility.  Discussed with patient, patient's nurse and with multidisciplinary team during rounds including , pharmacist and charge nurse.      Consultants/Specialty  Dr. Newton Neuro optho     Code Status  FULL    Disposition  Likely home tomorrow     Review of Systems  Review of Systems   Constitutional: Negative for chills and fever.   HENT: Negative for congestion and sore throat.    Eyes: Positive for blurred vision (improving ), double vision and pain. Negative for discharge and redness.        Reduced vision in left eye    Respiratory: Negative for cough, hemoptysis, sputum production, shortness of breath and stridor.    Cardiovascular: Negative for chest  pain, palpitations and leg swelling.   Gastrointestinal: Negative for abdominal pain, blood in stool, diarrhea and vomiting.   Genitourinary: Negative for flank pain, hematuria and urgency.   Musculoskeletal: Negative for myalgias.   Skin: Negative.    Neurological: Negative for speech change, focal weakness, seizures and loss of consciousness.   Endo/Heme/Allergies: Does not bruise/bleed easily.   Psychiatric/Behavioral: Negative for hallucinations and suicidal ideas. The patient is not nervous/anxious.         Physical Exam  Temp:  [36.1 °C (97 °F)-36.5 °C (97.7 °F)] 36.1 °C (97 °F)  Pulse:  [69-80] 69  Resp:  [16-18] 18  BP: (115-134)/(58-85) 134/85  SpO2:  [91 %-94 %] 92 %    Physical Exam   Constitutional: She is oriented to person, place, and time. She appears well-developed and well-nourished. No distress.   HENT:   Head: Normocephalic and atraumatic.   Right Ear: External ear normal.   Left Ear: External ear normal.   Eyes: Pupils are equal, round, and reactive to light. Conjunctivae are normal. Right eye exhibits no discharge. Left eye exhibits no discharge.   Reduced vision in left eye    Neck: Normal range of motion. Neck supple. No JVD present. No tracheal deviation present. No thyromegaly present.   Cardiovascular: Exam reveals no gallop and no friction rub.    No murmur heard.  Pulmonary/Chest: Effort normal and breath sounds normal. No stridor. No respiratory distress. She has no wheezes. She has no rales. She exhibits no tenderness.   Abdominal: Soft. Bowel sounds are normal. She exhibits no distension. There is no tenderness. There is no rebound and no guarding.   Musculoskeletal: Normal range of motion. She exhibits no edema, tenderness or deformity.   Neurological: She is alert and oriented to person, place, and time. A cranial nerve deficit (Reduced vision in left eye ) is present. Coordination normal.   Reduced vision in left eye    Skin: Skin is warm and dry. No rash noted. She is not  diaphoretic. No erythema. No pallor.   Psychiatric: She has a normal mood and affect. Her behavior is normal. Judgment normal.   Nursing note and vitals reviewed.      Fluids    Intake/Output Summary (Last 24 hours) at 05/29/19 1913  Last data filed at 05/29/19 0000   Gross per 24 hour   Intake              120 ml   Output                0 ml   Net              120 ml       Laboratory  Recent Labs      05/27/19 2115 05/28/19   0232   WBC  8.6  8.1   RBC  4.69  4.76   HEMOGLOBIN  14.2  14.4   HEMATOCRIT  41.9  43.1   MCV  89.3  90.5   MCH  30.3  30.3   MCHC  33.9  33.4*   RDW  41.8  41.2   PLATELETCT  196  189   MPV  11.3  11.3     Recent Labs      05/27/19 2115 05/28/19 0232   SODIUM  139  135   POTASSIUM  4.1  4.3   CHLORIDE  106  104   CO2  23  19*   GLUCOSE  96  168*   BUN  16  14   CREATININE  0.81  0.83   CALCIUM  9.4  9.3                   Imaging  [id number] WITH & W/O   Final Result      1.  CT of the orbits without and with contrast within normal limits.      CT-HEAD WITH & W/O   Final Result      1.  Head CT without and with contrast within normal limits. No evidence of acute cerebral infarction, hemorrhage, mass lesion, or abnormal enhancement.           Assessment/Plan  * Optic neuritis   Assessment & Plan    Left eye optic neuritis Dr. Newton evaluated patient on admission and will follow along in hospital   Check inflammatory markers   Cont with IV steroids 1 gm daily x 3 days   Pain control, still having severe pain  Follow-up with neuro-ophthalmologist      Controlled type 2 diabetes mellitus without complication, without long-term current use of insulin ([facility])- (present on admission)   Assessment & Plan    Glycated hemoglobin 6.1%   Short acting insulin for now.  Expect blood sugars to increase with steroids  Accu-Checks, hypoglycemia protocol        Chronic inflammatory arthritis- (present on admission)   Assessment & Plan    History of cont pain control       Weakness- (present on  admission)   Assessment & Plan    Multifactorial, obesity, reduced mobility, steroid with a myopathic from frequent courses of steroid  Physical and occupational therapy evaluation       Constipation- (present on admission)   Assessment & Plan    I am starting lactulose.     GERD (gastroesophageal reflux disease)- (present on admission)   Assessment & Plan    Resume home PPI       Morbidly obese (HCC)- (present on admission)   Assessment & Plan    Encouraged weight loss      Peripheral neuropathy (CMS-HCC)- (present on admission)   Assessment & Plan    Pregabalin  Fluoxetine      Depression- (present on admission)   Assessment & Plan    Resume home proazac           VTE prophylaxis: Heparin

## 2019-05-30 ENCOUNTER — PATIENT OUTREACH (OUTPATIENT)
Dept: HEALTH INFORMATION MANAGEMENT | Facility: OTHER | Age: 29
End: 2019-05-30

## 2019-05-30 ENCOUNTER — APPOINTMENT (OUTPATIENT)
Dept: RADIOLOGY | Facility: MEDICAL CENTER | Age: 29
DRG: 123 | End: 2019-05-30
Attending: PSYCHIATRY & NEUROLOGY
Payer: MEDICARE

## 2019-05-30 VITALS
WEIGHT: 275.35 LBS | RESPIRATION RATE: 18 BRPM | TEMPERATURE: 97.5 F | HEART RATE: 73 BPM | DIASTOLIC BLOOD PRESSURE: 83 MMHG | HEIGHT: 65 IN | SYSTOLIC BLOOD PRESSURE: 138 MMHG | BODY MASS INDEX: 45.88 KG/M2 | OXYGEN SATURATION: 91 %

## 2019-05-30 LAB
ANION GAP SERPL CALC-SCNC: 11 MMOL/L (ref 0–11.9)
BASOPHILS # BLD AUTO: 0.1 % (ref 0–1.8)
BASOPHILS # BLD: 0.01 K/UL (ref 0–0.12)
BUN SERPL-MCNC: 22 MG/DL (ref 8–22)
CALCIUM SERPL-MCNC: 9.2 MG/DL (ref 8.5–10.5)
CHLORIDE SERPL-SCNC: 100 MMOL/L (ref 96–112)
CO2 SERPL-SCNC: 25 MMOL/L (ref 20–33)
CREAT SERPL-MCNC: 0.86 MG/DL (ref 0.5–1.4)
EOSINOPHIL # BLD AUTO: 0 K/UL (ref 0–0.51)
EOSINOPHIL NFR BLD: 0 % (ref 0–6.9)
ERYTHROCYTE [DISTWIDTH] IN BLOOD BY AUTOMATED COUNT: 41.9 FL (ref 35.9–50)
GLUCOSE BLD-MCNC: 145 MG/DL (ref 65–99)
GLUCOSE BLD-MCNC: 173 MG/DL (ref 65–99)
GLUCOSE SERPL-MCNC: 155 MG/DL (ref 65–99)
HCT VFR BLD AUTO: 42.7 % (ref 37–47)
HGB BLD-MCNC: 14.9 G/DL (ref 12–16)
IMM GRANULOCYTES # BLD AUTO: 0.06 K/UL (ref 0–0.11)
IMM GRANULOCYTES NFR BLD AUTO: 0.7 % (ref 0–0.9)
LYMPHOCYTES # BLD AUTO: 0.9 K/UL (ref 1–4.8)
LYMPHOCYTES NFR BLD: 10.3 % (ref 22–41)
MCH RBC QN AUTO: 30.8 PG (ref 27–33)
MCHC RBC AUTO-ENTMCNC: 34.9 G/DL (ref 33.6–35)
MCV RBC AUTO: 88.2 FL (ref 81.4–97.8)
MONOCYTES # BLD AUTO: 0.16 K/UL (ref 0–0.85)
MONOCYTES NFR BLD AUTO: 1.8 % (ref 0–13.4)
NEUTROPHILS # BLD AUTO: 7.63 K/UL (ref 2–7.15)
NEUTROPHILS NFR BLD: 87.1 % (ref 44–72)
NRBC # BLD AUTO: 0 K/UL
NRBC BLD-RTO: 0 /100 WBC
PLATELET # BLD AUTO: 216 K/UL (ref 164–446)
PMV BLD AUTO: 10.9 FL (ref 9–12.9)
POTASSIUM SERPL-SCNC: 4.1 MMOL/L (ref 3.6–5.5)
RBC # BLD AUTO: 4.84 M/UL (ref 4.2–5.4)
SODIUM SERPL-SCNC: 136 MMOL/L (ref 135–145)
WBC # BLD AUTO: 8.8 K/UL (ref 4.8–10.8)

## 2019-05-30 PROCEDURE — 700102 HCHG RX REV CODE 250 W/ 637 OVERRIDE(OP): Performed by: HOSPITALIST

## 2019-05-30 PROCEDURE — 36415 COLL VENOUS BLD VENIPUNCTURE: CPT

## 2019-05-30 PROCEDURE — A9270 NON-COVERED ITEM OR SERVICE: HCPCS | Performed by: HOSPITALIST

## 2019-05-30 PROCEDURE — 85025 COMPLETE CBC W/AUTO DIFF WBC: CPT

## 2019-05-30 PROCEDURE — 700111 HCHG RX REV CODE 636 W/ 250 OVERRIDE (IP): Performed by: HOSPITALIST

## 2019-05-30 PROCEDURE — 99239 HOSP IP/OBS DSCHRG MGMT >30: CPT | Performed by: HOSPITALIST

## 2019-05-30 PROCEDURE — 80048 BASIC METABOLIC PNL TOTAL CA: CPT

## 2019-05-30 PROCEDURE — 82962 GLUCOSE BLOOD TEST: CPT | Mod: 91

## 2019-05-30 RX ORDER — PREDNISONE 20 MG/1
60 TABLET ORAL ONCE
Status: COMPLETED | OUTPATIENT
Start: 2019-05-30 | End: 2019-05-30

## 2019-05-30 RX ADMIN — SENNOSIDES, DOCUSATE SODIUM 2 TABLET: 50; 8.6 TABLET, FILM COATED ORAL at 04:49

## 2019-05-30 RX ADMIN — OXYCODONE HYDROCHLORIDE 10 MG: 10 TABLET ORAL at 14:56

## 2019-05-30 RX ADMIN — OXYCODONE HYDROCHLORIDE 10 MG: 10 TABLET ORAL at 03:34

## 2019-05-30 RX ADMIN — FLUOXETINE 10 MG: 10 CAPSULE ORAL at 04:49

## 2019-05-30 RX ADMIN — METHOCARBAMOL TABLETS 750 MG: 750 TABLET, COATED ORAL at 04:49

## 2019-05-30 RX ADMIN — INSULIN HUMAN 1 UNITS: 100 INJECTION, SOLUTION PARENTERAL at 04:56

## 2019-05-30 RX ADMIN — FUROSEMIDE 80 MG: 40 TABLET ORAL at 04:48

## 2019-05-30 RX ADMIN — OXYCODONE HYDROCHLORIDE 10 MG: 10 TABLET ORAL at 09:22

## 2019-05-30 RX ADMIN — LACTULOSE 15 ML: 20 SOLUTION ORAL at 04:48

## 2019-05-30 RX ADMIN — ASPIRIN 81 MG: 81 TABLET, DELAYED RELEASE ORAL at 04:49

## 2019-05-30 RX ADMIN — METHOCARBAMOL TABLETS 750 MG: 750 TABLET, COATED ORAL at 14:55

## 2019-05-30 RX ADMIN — BUSPIRONE HYDROCHLORIDE 5 MG: 5 TABLET ORAL at 14:56

## 2019-05-30 RX ADMIN — ZIPRASIDONE HYDROCHLORIDE 80 MG: 80 CAPSULE ORAL at 04:57

## 2019-05-30 RX ADMIN — PREGABALIN 150 MG: 75 CAPSULE ORAL at 04:48

## 2019-05-30 RX ADMIN — PREDNISONE 60 MG: 20 TABLET ORAL at 15:24

## 2019-05-30 RX ADMIN — SODIUM BICARBONATE 325 MG: 650 TABLET ORAL at 04:48

## 2019-05-30 NOTE — PROGRESS NOTES
Pt A&Ox4, flat affect, c/o 5/10 left eye sharp pain, states vision is improving, medicated per MAR for pain. Pt NEWTON, denies N/T, tolerating diet, voiding but with incontinence and urgency at times. Pt ambulating with SBA 4 wheel walker, tolerates well. Wound to left breast cleansed and dressed with adhesive foam, pt reports her wounds on her body are from her splint and scratching.

## 2019-05-30 NOTE — CARE PLAN
Problem: Pain Management  Goal: Pain level will decrease to patient's comfort goal  Outcome: PROGRESSING AS EXPECTED  Pt c/o 5/10 sharp pain to left eye   Goal is 3/10   Encouraging non pharm interventions along with oxycodone PRN       Problem: Venous Thromboembolism (VTW)/Deep Vein Thrombosis (DVT) Prevention:  Goal: Patient will participate in Venous Thrombosis (VTE)/Deep Vein Thrombosis (DVT)Prevention Measures  Outcome: PROGRESSING AS EXPECTED  SCDs in place   Encouraging ROM exercises

## 2019-05-30 NOTE — PROGRESS NOTES
Pt ambulated carpio way with walker, 270 ft x 2, tolerated well. Pt had large BM, scant amount of blood noted.

## 2019-05-30 NOTE — DISCHARGE INSTRUCTIONS
Discharge Instructions    Discharged to home by car with relative. Discharged via wheelchair, hospital escort: Refused.  Special equipment needed: Walker    Be sure to schedule a follow-up appointment with your primary care doctor or any specialists as instructed.     Discharge Plan:   Diet Plan: Discussed  Activity Level: Discussed  Confirmed Follow up Appointment: Patient to Call and Schedule Appointment  Confirmed Symptoms Management: Discussed  Medication Reconciliation Updated: Yes  Influenza Vaccine Indication: Patient Refuses    I understand that a diet low in cholesterol, fat, and sodium is recommended for good health. Unless I have been given specific instructions below for another diet, I accept this instruction as my diet prescription.   Other diet: Diabetic    Special Instructions: None    · Is patient discharged on Warfarin / Coumadin?   No     Depression / Suicide Risk    As you are discharged from this [facility] facility, it is important to learn how to keep safe from harming yourself.    Recognize the warning signs:  · Abrupt changes in personality, positive or negative- including increase in energy   · Giving away possessions  · Change in eating patterns- significant weight changes-  positive or negative  · Change in sleeping patterns- unable to sleep or sleeping all the time   · Unwillingness or inability to communicate  · Depression  · Unusual sadness, discouragement and loneliness  · Talk of wanting to die  · Neglect of personal appearance   · Rebelliousness- reckless behavior  · Withdrawal from people/activities they love  · Confusion- inability to concentrate     If you or a loved one observes any of these behaviors or has concerns about self-harm, here's what you can do:  · Talk about it- your feelings and reasons for harming yourself  · Remove any means that you might use to hurt yourself (examples: pills, rope, extension cords, firearm)  · Get professional help from the community (Mental  Health, Substance Abuse, psychological counseling)  · Do not be alone:Call your Safe Contact- someone whom you trust who will be there for you.  · Call your local CRISIS HOTLINE 612-3634 or 903-572-1859  · Call your local Children's Mobile Crisis Response Team [place] (683) 494-3566 or www.[place]  · Call the toll free National Suicide Prevention Hotlines   · National Suicide Prevention Lifeline 165-849-MJUG (2676)  · National Hope Line Network 800-SUICIDE (322-6866)       Mohs Method Verbiage: The operative site was curetted for margins and a beveled incision following the standard Mohs approach was done.  The specimen was harvested as a microscopic controlled layer and a map of the extirpated tissue was made for orientation.

## 2019-05-30 NOTE — PROGRESS NOTES
Patient discharged via wheelchair with grandmother. Discharge education provided, pt expressed understanding of education given. PIV removed, dressing applied. All belongings accounted for. Patients home medications returned. Medicated per MAR for eye pain. Prednisone given per MAR prior to discharge.

## 2019-05-30 NOTE — CARE PLAN
Problem: Safety  Goal: Will remain free from injury  Outcome: PROGRESSING AS EXPECTED  Bed locked and in lowest position. Call light and personal belongings in reach. Bed alarm in place. Hourly rounding.     Problem: Venous Thromboembolism (VTW)/Deep Vein Thrombosis (DVT) Prevention:  Goal: Patient will participate in Venous Thrombosis (VTE)/Deep Vein Thrombosis (DVT)Prevention Measures  Outcome: PROGRESSING AS EXPECTED  Pt has SCDs, encouraging ambulation.

## 2019-05-30 NOTE — ASSESSMENT & PLAN NOTE
Multifactorial, obesity, reduced mobility, steroid with a myopathic from frequent courses of steroid  Physical and occupational therapy evaluation

## 2019-05-30 NOTE — DISCHARGE SUMMARY
"Discharge Summary    CHIEF COMPLAINT ON ADMISSION  Chief Complaint   Patient presents with   • Eye Pain     x2.5 weeks - L eye \"severe loss of vision and pain\"     Reason for Admission  Eye pain and loss of vision, sent by Dr Cabral     Admission Date  5/27/2019    CODE STATUS  Full Code    HPI & HOSPITAL COURSE  28 y.o. female with past medical history of diabetes, atrial fibrillation, chronic back pain, migraines admitted 5/27/2019 with optic neuritis she gets intravenous steroid after being seen by Dr Sousa.  Patient was started on intravenous steroids.  She had improvement in her vision.  However, less improvement in her pain.  Blood sugars slightly increased reaching up to 194.  Blood pressures within normal limits.  The patient has generalized weakness, she has multiple reasons, obesity, reduced mobility, possible myopathy from frequent steroid courses.  Physical and Occupational Therapy evaluation recommended home health, however the patient refused. The patient was seen by Dr. Cabral's on 5/30/2019 and recommended a one time dose of prednisone 60 mg before discharge.  Therefore, she is discharged in fair and stable condition to home with close outpatient follow-up.  The patient met 2-midnight criteria for an inpatient stay at the time of discharge.    Discharge Date  5/30/2019     FOLLOW UP ITEMS POST DISCHARGE  Follow up with neuro ophalomology Dr. Cabral's as instructed     DISCHARGE DIAGNOSES  Principal Problem:    Optic neuritis POA: Yes  Active Problems:    Controlled type 2 diabetes mellitus without complication, without long-term current use of insulin (HCC) POA: Yes    Chronic inflammatory arthritis (Chronic) POA: Yes    Depression POA: Yes    Peripheral neuropathy (CMS-HCC) (Chronic) POA: Yes    Morbidly obese (HCC) POA: Yes    GERD (gastroesophageal reflux disease) (Chronic) POA: Yes    Constipation POA: Yes    Weakness POA: Yes  Resolved Problems:    * No resolved hospital problems. " *    FOLLOW UP  Future Appointments  Date Time Provider Department Center   6/10/2019 11:00 AM Emmy Cespedes, PT, DPT [address] [facility] [facility] [address] [facility] [address]   6/17/2019 11:30 AM Ursula Escalera, PT [address] [facility] [address] [facility] [address]   6/19/2019 11:00 AM Emmy Cespedes, PT, DPT [address] [place] [address]   7/1/2019 11:30 AM Ursula Escalera, PT [address] [place] [address]   10/2/2019 8:40 AM John Leon M.D. [facility] None     Torres Brody M.D.  [address] [facility] [address]  [facility] [address]  [phone]    Schedule an appointment as soon as possible for a visit in 1 week  As needed    Luis Felipe Yousif D.O.  [address] [facility] [address]  [facility] [address]  [phone]    Schedule an appointment as soon as possible for a visit      MEDICATIONS ON DISCHARGE     Medication List      CHANGE how you take these medications      Instructions   sodium bicarbonate 650 MG Tabs  What changed:  Another medication with the same name was removed. Continue taking this medication, and follow the directions you see here.  Commonly known as:  SODIUM BICARBONATE   Take 1 Tab by mouth 2 times a day.  Dose:  650 mg        CONTINUE taking these medications      Instructions   aspirin EC 81 MG Tbec  Commonly known as:  ECOTRIN   Take 1 Tab by mouth every day.  Dose:  81 mg     busPIRone 5 MG tablet  Commonly known as:  BUSPAR   Take 1 Tab by mouth 2 times a day.  Dose:  5 mg     DEBLITANE 0.35 MG tablet  Generic drug:  norethindrone   Take 0.35 mg by mouth every day.  Dose:  0.35 mg     Diclofenac Sodium 1 % Gel  Commonly known as:  VOLTAREN   Apply 1 Inch to skin as directed 4 times a day.  Dose:  1 Inch     FLUoxetine 10 MG Caps  Commonly known as:  PROZAC   TAKE ONE CAPSULE BY MOUTH ONCE A DAY     furosemide 40 MG Tabs  Commonly known as:  LASIX   Take 80 mg by mouth every day.  Dose:  80 mg     Ivabradine HCl 7.5 MG Tabs   Take 7.5 mg by mouth 2 Times a Day.  Dose:  7.5 mg     LYRICA 300 MG capsule  Generic drug:  pregabalin   Take 300 mg by mouth 2 times a day.  Dose:  300 mg    "  methocarbamol 750 MG Tabs  Commonly known as:  ROBAXIN   Take 750 mg by mouth 3 times a day.  Dose:  750 mg     traZODone 100 MG Tabs  Commonly known as:  DESYREL   TAKE  ONE TABLET BY MOUTH NIGHTLY AT BEDTIME     TYLENOL PM EXTRA STRENGTH PO   Take 1 Cap by mouth every day.  Dose:  1 Cap     vitamin D (Ergocalciferol) 01396 units Caps capsule  Commonly known as:  DRISDOL   Take 50,000 Units by mouth every Friday.  Dose:  63758 Units     ziprasidone 80 MG Caps  Commonly known as:  GEODON   Take 80 mg by mouth 2 Times a Day.  Dose:  80 mg            Allergies  Allergies   Allergen Reactions   • Cefdinir Shortness of Breath and Itching     Tolerated 1/18/17  Tolerates ceftriaxone    • Depakote [Divalproex Sodium] Unspecified     Muscle spasms/muscle pain and weakness     • Doxycycline Anaphylaxis and Vomiting     RXN=unknown   • Abilify Unspecified     Headaches/muscle twitching     • Amitriptyline Unspecified     Headaches     • Amoxicillin Rash     Pt states \"I get a rash\".     • Ciprofloxacin Rash     Pt states \"I get a rash\".     • Clindamycin Nausea     Even with food     • Ees [Erythromycin] Vomiting and Nausea   • Flagyl [Metronidazole Hcl] Unspecified     \"eye problems\"     • Flomax [Tamsulosin Hydrochloride] Swelling   • Metformin Unspecified     Increased lactic acid      • Sulfa Drugs Hives and Rash     RXN=since childhood  PATIENT DID FINE WITH BACTRIM X 2 COURSES 10/2018   • Tape Rash     Tears skin off   coban with Tegaderm tape ok  RXN=ongoing   • Vancomycin Itching     Pt becomes flushed in face and chest.   RXN=7/10/16   • Wound Dressing Adhesive Hives     By pt report   • Cephalexin [Keflex] Rash     Pt states she gets a rash with this medication  Tolerates ceftriaxone   • Levofloxacin Unspecified     Leg muscle cramps   • Metronidazole Rash     .   • Valproic Acid Rash     .       DIET  Orders Placed This Encounter   Procedures   • Diet Order Diabetic     Standing Status:   Standing     Number of " Occurrences:   1     Order Specific Question:   Diet:     Answer:   Diabetic [3]       ACTIVITY  As tolerated.  Weight bearing as tolerated    CONSULTATIONS  Neuro ophalomology Dr. Cabral's as instructed      PROCEDURES  N/A     LABORATORY  Lab Results   Component Value Date    SODIUM 136 05/30/2019    POTASSIUM 4.1 05/30/2019    CHLORIDE 100 05/30/2019    CO2 25 05/30/2019    GLUCOSE 155 (H) 05/30/2019    BUN 22 05/30/2019    CREATININE 0.86 05/30/2019    CREATININE 0.75 (L) 07/20/2010    GLOMRATE >59 07/20/2010        Lab Results   Component Value Date    WBC 8.8 05/30/2019    WBC 6.1 07/20/2010    HEMOGLOBIN 14.9 05/30/2019    HEMATOCRIT 42.7 05/30/2019    PLATELETCT 216 05/30/2019        Total time of the discharge process exceeds 37 minutes.

## 2019-05-30 NOTE — PROGRESS NOTES
Pt back from doctors appointment. Eating lunch, accucheck completed. No insulin required.  ordered 60 mg Prednisone to be given prior to discharge today. MD notified, order received.

## 2019-06-01 ENCOUNTER — HOSPITAL ENCOUNTER (OUTPATIENT)
Facility: MEDICAL CENTER | Age: 29
End: 2019-06-01
Attending: EMERGENCY MEDICINE
Payer: MEDICARE

## 2019-06-01 ENCOUNTER — OFFICE VISIT (OUTPATIENT)
Dept: URGENT CARE | Facility: CLINIC | Age: 29
End: 2019-06-01
Payer: MEDICARE

## 2019-06-01 VITALS
OXYGEN SATURATION: 97 % | HEIGHT: 65 IN | WEIGHT: 275 LBS | RESPIRATION RATE: 18 BRPM | HEART RATE: 76 BPM | BODY MASS INDEX: 45.82 KG/M2 | TEMPERATURE: 100 F | SYSTOLIC BLOOD PRESSURE: 122 MMHG | DIASTOLIC BLOOD PRESSURE: 68 MMHG

## 2019-06-01 DIAGNOSIS — R39.9 SYMPTOMS INVOLVING URINARY SYSTEM: ICD-10-CM

## 2019-06-01 LAB
APPEARANCE UR: NORMAL
BILIRUB UR STRIP-MCNC: NEGATIVE MG/DL
COLOR UR AUTO: NORMAL
GLUCOSE UR STRIP.AUTO-MCNC: NEGATIVE MG/DL
KETONES UR STRIP.AUTO-MCNC: NEGATIVE MG/DL
LEUKOCYTE ESTERASE UR QL STRIP.AUTO: NORMAL
NITRITE UR QL STRIP.AUTO: NORMAL
PH UR STRIP.AUTO: 7 [PH] (ref 5–8)
PROT UR QL STRIP: NEGATIVE MG/DL
RBC UR QL AUTO: NORMAL
SP GR UR STRIP.AUTO: 1.02
UROBILINOGEN UR STRIP-MCNC: 1 MG/DL

## 2019-06-01 PROCEDURE — 99213 OFFICE O/P EST LOW 20 MIN: CPT | Performed by: EMERGENCY MEDICINE

## 2019-06-01 PROCEDURE — 87186 SC STD MICRODIL/AGAR DIL: CPT

## 2019-06-01 PROCEDURE — 87077 CULTURE AEROBIC IDENTIFY: CPT

## 2019-06-01 PROCEDURE — 87086 URINE CULTURE/COLONY COUNT: CPT

## 2019-06-01 PROCEDURE — 81002 URINALYSIS NONAUTO W/O SCOPE: CPT | Performed by: EMERGENCY MEDICINE

## 2019-06-01 RX ORDER — GRANULES FOR ORAL 3 G/1
3 POWDER ORAL ONCE
Qty: 1 EACH | Refills: 0 | Status: SHIPPED | OUTPATIENT
Start: 2019-06-01 | End: 2019-06-01

## 2019-06-01 RX ORDER — PHENAZOPYRIDINE HYDROCHLORIDE 200 MG/1
200 TABLET, FILM COATED ORAL 3 TIMES DAILY PRN
Qty: 6 TAB | Refills: 0 | Status: SHIPPED | OUTPATIENT
Start: 2019-06-01 | End: 2019-06-11 | Stop reason: SDUPTHER

## 2019-06-01 ASSESSMENT — ENCOUNTER SYMPTOMS
FLANK PAIN: 0
VOMITING: 0
NAUSEA: 0

## 2019-06-01 NOTE — PROGRESS NOTES
Subjective:      Kristin Balderrama is a 28 y.o. female who presents with UTI (burning pain when peeing x 2 days )            Dysuria    This is a recurrent problem. Episode onset: 2 days. The problem occurs every urination. The problem has been gradually worsening. The quality of the pain is described as burning. Associated symptoms include hematuria and urgency. Pertinent negatives include no discharge, flank pain, frequency, hesitancy, nausea, possible pregnancy or vomiting. She has tried nothing for the symptoms. Her past medical history is significant for recurrent UTIs.   Recent hospitalization steroid use.    Review of Systems   Gastrointestinal: Negative for nausea and vomiting.   Genitourinary: Positive for dysuria, hematuria and urgency. Negative for flank pain, frequency and hesitancy.     PMH:  has a past medical history of Abdominal pain; Anginal syndrome; Apnea, sleep; Arrhythmia; Arthritis; ASTHMA; Atrial fibrillation ([facility]); Back pain; Borderline personality disorder ([facility]); Breath shortness; Bronchitis; Cardiac arrhythmia; Chickenpox; Chronic UTI (9/18/2010); Cough; Daytime sleepiness; Depression; Diabetes ([facility]); Diarrhea; Disorder of thyroid; Fall; Fatigue; Frequent headaches; Gasping for breath; Gynecological disorder; Headache(784.0); Heart burn; History of falling; Hypertension; Indigestion; Migraine; 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[facility]); 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  MEDS:   Current Outpatient Prescriptions:   •  fosfomycin (MONUROL) 3 GM Pack, Take 3 g by mouth Once for 1 dose., Disp: 1 Each, Rfl: 0  •  phenazopyridine (PYRIDIUM) 200 MG Tab, Take 1 Tab by  "mouth 3 times a day as needed., Disp: 6 Tab, Rfl: 0  •  Ivabradine HCl 7.5 MG Tab, Take 7.5 mg by mouth 2 Times a Day., Disp: , Rfl:   •  vitamin D, Ergocalciferol, (DRISDOL) 18419 units Cap capsule, Take 50,000 Units by mouth every Friday., Disp: , Rfl:   •  norethindrone (DEBLITANE) 0.35 MG tablet, Take 0.35 mg by mouth every day., Disp: , Rfl:   •  ziprasidone (GEODON) 80 MG Cap, Take 80 mg by mouth 2 Times a Day., Disp: , Rfl:   •  methocarbamol (ROBAXIN) 750 MG Tab, Take 750 mg by mouth 3 times a day., Disp: , Rfl:   •  Diclofenac Sodium (VOLTAREN) 1 % Gel, Apply 1 Inch to skin as directed 4 times a day., Disp: 5 Tube, Rfl: 6  •  sodium bicarbonate (SODIUM BICARBONATE) 650 MG Tab, Take 1 Tab by mouth 2 times a day., Disp: 360 Tab, Rfl: 3  •  FLUoxetine (PROZAC) 10 MG Cap, TAKE ONE CAPSULE BY MOUTH ONCE A DAY, Disp: 30 Cap, Rfl: 2  •  traZODone (DESYREL) 100 MG Tab, TAKE  ONE TABLET BY MOUTH NIGHTLY AT BEDTIME, Disp: 90 Tab, Rfl: 2  •  Diphenhydramine-APAP, sleep, (TYLENOL PM EXTRA STRENGTH PO), Take 1 Cap by mouth every day., Disp: , Rfl:   •  LYRICA 300 MG capsule, Take 300 mg by mouth 2 times a day., Disp: , Rfl:   •  busPIRone (BUSPAR) 5 MG tablet, Take 1 Tab by mouth 2 times a day., Disp: 60 Tab, Rfl: 5  •  furosemide (LASIX) 40 MG Tab, Take 80 mg by mouth every day., Disp: , Rfl:   •  aspirin EC (ECOTRIN) 81 MG Tablet Delayed Response, Take 1 Tab by mouth every day., Disp: 30 Tab, Rfl: 6  ALLERGIES:   Allergies   Allergen Reactions   • Cefdinir Shortness of Breath and Itching     Tolerated 1/18/17  Tolerates ceftriaxone    • Depakote [Divalproex Sodium] Unspecified     Muscle spasms/muscle pain and weakness     • Doxycycline Anaphylaxis and Vomiting     RXN=unknown   • Abilify Unspecified     Headaches/muscle twitching     • Amitriptyline Unspecified     Headaches     • Amoxicillin Rash     Pt states \"I get a rash\".     • Ciprofloxacin Rash     Pt states \"I get a rash\".     • Clindamycin Nausea     Even " "with food     • Ees [Erythromycin] Vomiting and Nausea   • Flagyl [Metronidazole Hcl] Unspecified     \"eye problems\"     • Flomax [Tamsulosin Hydrochloride] Swelling   • Metformin Unspecified     Increased lactic acid      • Sulfa Drugs Hives and Rash     RXN=since childhood  PATIENT DID FINE WITH BACTRIM X 2 COURSES 10/2018   • Tape Rash     Tears skin off   coban with Tegaderm tape ok  RXN=ongoing   • Vancomycin Itching     Pt becomes flushed in face and chest.   RXN=7/10/16   • Wound Dressing Adhesive Hives     By pt report   • Cephalexin [Keflex] Rash     Pt states she gets a rash with this medication  Tolerates ceftriaxone   • Levofloxacin Unspecified     Leg muscle cramps   • Metronidazole Rash     .   • Valproic Acid Rash     .     SURGHX:   Past Surgical History:   Procedure Laterality Date   • MUSCLE BIOPSY Right 1/26/2017    Procedure: MUSCLE BIOPSY - THIGH;  Surgeon: Isidro Vigil M.D.;  Location: [facility];  Service:    • GASTROSCOPY WITH BALLOON DILATATION N/A 1/4/2017    Procedure: GASTROSCOPY WITH DILATATION;  Surgeon: Torres Vargas M.D.;  Location: [facility];  Service:    • BOWEL STIMULATOR INSERTION  7/13/2016    Procedure: BOWEL STIMULATOR INSERTION FOR PERMANENT INTERSTIM SACRAL IMPLANT;  Surgeon: Joe Noyola M.D.;  Location: [facility];  Service:    • RECOVERY  1/27/2016    Procedure: CATH LAB EP STUDY WITH SINUS NODE MODIFICATION ABHINAV;  Surgeon: Recoveryonly Surgery;  [place] [facility] [place]  [place]:    • OTHER CARDIAC SURGERY  1/2016    cardiac ablation   • NEURO DEST FACET L/S W/IG SNGL  4/21/2015    Performed by Reza [place]   • LUMBAR FUSION ANTERIOR  8/21/2012    Performed by SUSIE SAWANT at [facility]   • ALYSSA BY LAPAROSCOPY  8/29/2010    Performed by SHAYY JOHNS at [facility]   • LAMINOTOMY     • OTHER ABDOMINAL SURGERY     • TONSILLECTOMY      tonsillectomy " "    SOCHX:  reports that she has never smoked. She has never used smokeless tobacco. She reports that she uses drugs, including Marijuana and Oral, about 7 times per week. She reports that she does not drink alcohol.  FH: family history includes Genitourinary () in her sister; Heart Disease in her maternal grandmother and mother; Hypertension in her maternal grandmother, maternal uncle, and mother; No Known Problems in her sister; Other in her mother and sister; Sleep Apnea in her mother.       Objective:     /68 (BP Location: Right arm, Patient Position: Sitting, BP Cuff Size: Adult)   Pulse 76   Temp 37.8 °C (100 °F) (Temporal)   Resp 18   Ht 1.651 m (5' 5\")   Wt 124.7 kg (275 lb)   LMP 05/27/2019   SpO2 97%   BMI 45.76 kg/m²      Physical Exam   Constitutional: She appears well-developed and well-nourished. She is cooperative. She does not have a sickly appearance. She does not appear ill. No distress.   Cardiovascular: Normal rate, regular rhythm and normal heart sounds.    Pulmonary/Chest: Effort normal and breath sounds normal.   Abdominal: Soft. She exhibits no distension. There is no tenderness. There is no CVA tenderness.   Neurological: She is alert.   Skin: Skin is warm and dry.   Psychiatric: She has a normal mood and affect.               Assessment/Plan:     1. Symptoms involving urinary system  - URINE CULTURE(NEW); Future  Positive blood, positive LE- POCT Urinalysis  - fosfomycin (MONUROL) 3 GM Pack; Take 3 g by mouth Once for 1 dose.  Dispense: 1 Each; Refill: 0  - VAGINAL PATHOGENS DNA PANEL; Future  - phenazopyridine (PYRIDIUM) 200 MG Tab; Take 1 Tab by mouth 3 times a day as needed.  Dispense: 6 Tab; Refill: 0      "

## 2019-06-02 ENCOUNTER — HOSPITAL ENCOUNTER (OUTPATIENT)
Facility: MEDICAL CENTER | Age: 29
End: 2019-06-02
Attending: EMERGENCY MEDICINE
Payer: MEDICARE

## 2019-06-02 DIAGNOSIS — R39.9 SYMPTOMS INVOLVING URINARY SYSTEM: ICD-10-CM

## 2019-06-02 LAB
CANDIDA DNA VAG QL PROBE+SIG AMP: NEGATIVE
G VAGINALIS DNA VAG QL PROBE+SIG AMP: NEGATIVE
T VAGINALIS DNA VAG QL PROBE+SIG AMP: NEGATIVE

## 2019-06-02 PROCEDURE — 87660 TRICHOMONAS VAGIN DIR PROBE: CPT

## 2019-06-02 PROCEDURE — 87480 CANDIDA DNA DIR PROBE: CPT

## 2019-06-02 PROCEDURE — 87510 GARDNER VAG DNA DIR PROBE: CPT

## 2019-06-02 NOTE — CONSULTS
DATE OF SERVICE:  05/30/2019    HISTORY OF PRESENT ILLNESS:  The patient was evaluated at [facility] on 05/30/2019.  The patient is 28 years of age and has a history of   remote presumed acute retrobulbar optic neuritis, right eye.  The patient was   last seen at the neuro-ophthalmology clinic on 05/16/2019.  At that visit, the   patient had been complaining of blurred vision associated with loss of color   vision and pain with extraocular movement.  The orbital pain was graded at an   8 on a scale of 0-10.  It is my impression that the patient may have acute   retrobulbar optic neuritis, left eye and a remote episode presumed acute   retrobulbar optic neuritis, right eye.  It was elected to obtain appropriate   laboratory studies, neuroimaging studies and give the patient a trial of   high-dose intravenous Solu-Medrol followed by an oral steroid taper.  The   patient notified the office 3 days ago 05/27/2019 and reported that her   orbital pain had increased to grade 10 on a scale of 0-10 and she had   increased pain with extraocular movement.  It was elected to admit the patient   to [facility] and obtain a CT scan of the brain and orbit and   begin treatment with high-dose intravenous Solu-Medrol.    The patient has had placement of an InterStim pacemaker (implanted unilateral   leads stimulating the S3 nerve root that is attached to a small pacemaker),   and the patient is unable to have MRI scan imaging.    ALLERGIES:  THE PATIENT HAS ALLERGIES TO MULTIPLE MEDICATIONS INCLUDING   CEFDINIR, DEPAKOTE, ABILIFY, AMITRIPTYLINE, AMOXICILLIN, CIPROFLOXACIN,   CLINDAMYCIN, DOXYCYCLINE, ERYTHROMYCIN, FLAGYL, FLOMAX, METFORMIN, SULFA   MEDICATIONS, TAPE, VANCOMYCIN, KEFLEX, LEVOFLOXACIN, AND LEVAQUIN.    MEDICATIONS:  Please refer to the hospital record.  In addition, the patient   has received 3 days of intravenous Solu-Medrol and is currently on a steroid   taper.     PAST MEDICAL HISTORY:  Type  2 diabetes mellitus, Hashimoto's thyroiditis, and   perhaps acute optic neuritis, right eye.  There is no history of hypertension.    She has a history of hemiplegic migraine.    PAST SURGICAL HISTORY:  Unchanged compared to previous examination.    FAMILY HISTORY:  Mother with history of hypertension.    SOCIAL HISTORY:  The patient does not smoke nor drink.    REVIEW OF SYSTEMS:  The patient has history of cardiac arrhythmia and   shortness of breath.    VISUAL ACUITY:  The best corrected visual acuity 05/16/2019, right eye 20/20   and left eye 20/20.    Best corrected visual acuity 05/30/2019 after receiving IV Solu-Medrol, right   eye 20/20, left eye 20/20, near J1 plus and J1.    HRR plates:  6 amps.      CONFRONTATION OF VISUAL FIELDS:  Full, normal.  Pupils 3.0.  There is a 0.3   log unit relative afferent pupillary defect, left eye.      FLICKER:  Right eye 25 Hz and left eye 22 Hz.      SLIT LAMP EXAMINATION:  Normal.  There was no cell and flare, both eyes.      TENSIONS:  20 for both.      MOTILITY EXAMINATION:  Ocular motility examination was performed.  PRIMARY GAZE:  2 prism diopters of esotropia.  RIGHT GAZE:  2 prism diopters of esotropia.  LEFT GAZE:  3 prism diopters of esotropia.  UPGAZE:  2 prism diopters of esotropia.  DOWNGAZE:  3 prism diopters of esotropia.    DILATED OPHTHALMOSCOPY:    RIGHT EYE:  Cup to disk ratio 0.45.  There is a trace temporal pallor of the   optic nerve.  The macula and peripheral retina were normal.    LEFT EYE:  Cup to disk ratio is 0.4.  The left optic nerve, macula and   peripheral retina appeared normal.  There was no evidence of disk edema.    P[place]leton visual field 30-2 right eye.  The foveal threshold was 30 dB.  The   visual field was normal.  Rest of the foveal threshold was 41 dB.  The visual   field was normal.      OCT:  The mean retinal nerve fiber layer thickness was normal right eye at 90   microns and normal left eye at 93 microns.  The macular thickness  maps were   normal both eyes.    FUNDUS PHOTOGRAPHY:  Showed a trace temporal pallor of the right optic nerve.    CT scan of the orbit with and without contrast, 05/27/2019, normal.    CT scan of the brain with and without contrast, 05/27/2019, normal.    LABORATORY STUDIES:  Obtained on 05/24/2019, the anti-MOG antibody was normal   at less than 1:10.  The comprehensive metabolic panel, vitamin B12 and folate   levels, C-reactive protein, treponema pallidum antibody, anticardiolipin   antibodies, ACE level, aquaporin-4, and AI were all normal.    ASSESSMENT:  1.  Clinically improved recurrent acute retrobulbar optic neuritis, left eye.  2.  Remote presumed acute retrobulbar optic neuritis, right eye.  3.  Low suspicion recurrent optic neuritis, right eye, 09/08/2018.  4.  Glaucoma suspect.  5.  Small angled esotropia.  6.  Type 2 diabetes mellitus.  7.  Remote history of paroxysmal atrial tachycardia (status post cardiac   ablation).  8.  Undifferentiated autoimmune disease.  9.  Moderate obesity.  10.  Status post lumbar laminectomy at the L4-L5 level in 2002.  11.  History of bowel and bladder incontinence.  12.  Status post placement of an InterStim pacemaker.  13.  Dysmorphic facies.  14.  Right common peroneal nerve palsy.  15.  Nonsmoker.    RECOMMENDATIONS:  1.  The patient has completed high-dose intravenous Solu-Medrol 1000 mg daily   for 3 days.  2.  Medrol Dosepak.  3.  Follow up with the neuro-ophthalmology clinic in 6 months.  4.  If the patient has recurrent orbital pain or visual loss, she is to notify   the office immediately.    5.  Follow up with general neurology at [facility].  6.  The risks, benefits, alternatives and expectations of above-mentioned   therapy have been discussed at length with the patient.       ____________________________________     MARITZA GROVER, DO MARKHAM / CHRISTELLE    DD:  06/02/2019 13:36:05  DT:  06/02/2019 14:14:47    D#:  [id number]  Job#:  [id number]

## 2019-06-03 NOTE — DOCUMENTATION QUERY
Critical access hospital                                                                       Query Response Note      PATIENT:               HARLEY DE LA CRUZ  ACCT #:                  [id number]  MRN:                     [id number]  :                      1990  ADMIT DATE:       2019 7:59 PM  DISCH DATE:        2019 4:04 PM  RESPONDING  PROVIDER #:        [id number]           QUERY TEXT:    Depression is documented in the Medical Record.  Please specify the type.    NOTE:  If an appropriate response is not listed below, please respond with a new note.              The patient's Clinical Indicators include:  Patient admitted with optic neuritis, has a history of depression currently treated with Prozac.    Query created by: Pratibha Limon on 6/3/2019 4:49 PM    RESPONSE TEXT:    MDD, single episode, in full remission          Electronically signed by:  MING LUND MD 6/3/2019 4:51 PM

## 2019-06-04 ENCOUNTER — OFFICE VISIT (OUTPATIENT)
Dept: MEDICAL GROUP | Facility: MEDICAL CENTER | Age: 29
End: 2019-06-04
Payer: MEDICARE

## 2019-06-04 ENCOUNTER — TELEPHONE (OUTPATIENT)
Dept: URGENT CARE | Facility: CLINIC | Age: 29
End: 2019-06-04

## 2019-06-04 VITALS
TEMPERATURE: 98.8 F | BODY MASS INDEX: 45.82 KG/M2 | OXYGEN SATURATION: 92 % | SYSTOLIC BLOOD PRESSURE: 110 MMHG | HEART RATE: 91 BPM | DIASTOLIC BLOOD PRESSURE: 72 MMHG | HEIGHT: 65 IN | WEIGHT: 275 LBS

## 2019-06-04 DIAGNOSIS — J02.9 SORE THROAT: ICD-10-CM

## 2019-06-04 LAB
INT CON NEG: NEGATIVE
INT CON POS: POSITIVE
S PYO AG THROAT QL: NORMAL

## 2019-06-04 PROCEDURE — 87880 STREP A ASSAY W/OPTIC: CPT | Performed by: INTERNAL MEDICINE

## 2019-06-04 PROCEDURE — 99213 OFFICE O/P EST LOW 20 MIN: CPT | Performed by: INTERNAL MEDICINE

## 2019-06-04 RX ORDER — AZITHROMYCIN 250 MG/1
250 TABLET, FILM COATED ORAL DAILY
Qty: 6 TAB | Refills: 0 | Status: SHIPPED | OUTPATIENT
Start: 2019-06-04 | End: 2019-06-09

## 2019-06-04 NOTE — PROGRESS NOTES
CC: Sore throat and earache.                                                                                                                                      HPI:   Kristin presents today with the following.    1. Sore throat  Presents with approximately 6 days of sore throat and earache.  Her mother had strep throat she was concerned that she contracted to herself.  She reports pain with swallowing stuffy nose and congestion as well as bubbling and pain in her ears.  Right greater than left.  She does report low-grade fevers.  She has been taking Tylenol for her symptoms.  Denying any other complaints today      Patient Active Problem List    Diagnosis Date Noted   • Left leg weakness 07/14/2018     Priority: High   • Controlled type 2 diabetes mellitus without complication, without long-term current use of insulin ([facility]) 04/26/2017     Priority: High   • Sinus tachycardia 10/31/2013     Priority: High   • Chronic inflammatory arthritis 05/23/2016     Priority: Medium   • Morbid obesity with BMI of 45.0-49.9, adult ([facility]) 10/24/2017     Priority: Low   • Depression 10/28/2016     Priority: Low   • Schizophrenia ([facility]) 10/27/2016     Priority: Low   • PCOS (polycystic ovarian syndrome) 11/23/2015     Priority: Low   • Progressive focal motor weakness 06/28/2015     Priority: Low   • Fatty liver disease, nonalcoholic 01/19/2015     Priority: Low   • Anxiety 12/16/2014     Priority: Low   • Knee pain, right 02/13/2014     Priority: Low   • Neurogenic bladder 04/02/2011     Priority: Low   • Chronic UTI 09/18/2010     Priority: Low   • Borderline personality disorder in adult ([facility]) 09/18/2010     Priority: Low   • Constipation 05/29/2019   • Weakness 05/29/2019   • Optic neuritis 05/27/2019   • Inappropriate sinus tachycardia 04/10/2019   • Asymptomatic bacteriuria 10/26/2018   • Palpitations 10/01/2018   • Chronic respiratory failure with hypoxia, on home oxygen therapy ([facility]) 08/08/2018   • Transaminitis  08/08/2018   • TONYA (obstructive sleep apnea) 01/09/2018   • Functional diarrhea 01/05/2018   • Breast wound 11/06/2017   • Intractable episodic cluster headache 09/14/2017   • Rash 06/09/2017   • Hashimoto's encephalopathy 05/17/2017   • Fall at home 05/13/2017   • On home oxygen therapy 04/15/2017   • Weakness of right upper extremity 02/23/2017   • Chronic suprapubic catheter (HCC) 02/16/2017   • Hypovitaminosis D 11/29/2016   • Chronic pain syndrome 10/27/2016   • Bowel and bladder incontinence 10/27/2016   • Galactorrhea 07/22/2016   • Elevated sedimentation rate 06/27/2016   • Weakness of both lower extremities 06/22/2016   • Vitamin D deficiency 05/21/2016   • Morbidly obese (HCC) 03/07/2016   • Scoliosis 03/07/2016   • GERD (gastroesophageal reflux disease) 03/07/2016   • Peripheral neuropathy ([facility]) 03/06/2016   • H/O prior ablation treatment 02/10/2016       Current Outpatient Prescriptions   Medication Sig Dispense Refill   • azithromycin (ZITHROMAX Z-ASHLEY) 250 MG Tab Take 1 Tab by mouth every day for 5 days. Take 2 tabs on day 1 6 Tab 0   • phenazopyridine (PYRIDIUM) 200 MG Tab Take 1 Tab by mouth 3 times a day as needed. 6 Tab 0   • Ivabradine HCl 7.5 MG Tab Take 7.5 mg by mouth 2 Times a Day.     • vitamin D, Ergocalciferol, (DRISDOL) 42394 units Cap capsule Take 50,000 Units by mouth every Friday.     • norethindrone (DEBLITANE) 0.35 MG tablet Take 0.35 mg by mouth every day.     • ziprasidone (GEODON) 80 MG Cap Take 80 mg by mouth 2 Times a Day.     • methocarbamol (ROBAXIN) 750 MG Tab Take 750 mg by mouth 3 times a day.     • Diclofenac Sodium (VOLTAREN) 1 % Gel Apply 1 Inch to skin as directed 4 times a day. 5 Tube 6   • sodium bicarbonate (SODIUM BICARBONATE) 650 MG Tab Take 1 Tab by mouth 2 times a day. 360 Tab 3   • FLUoxetine (PROZAC) 10 MG Cap TAKE ONE CAPSULE BY MOUTH ONCE A DAY 30 Cap 2   • traZODone (DESYREL) 100 MG Tab TAKE  ONE TABLET BY MOUTH NIGHTLY AT BEDTIME 90 Tab 2   •  "Diphenhydramine-APAP, sleep, (TYLENOL PM EXTRA STRENGTH PO) Take 1 Cap by mouth every day.     • LYRICA 300 MG capsule Take 300 mg by mouth 2 times a day.     • busPIRone (BUSPAR) 5 MG tablet Take 1 Tab by mouth 2 times a day. 60 Tab 5   • furosemide (LASIX) 40 MG Tab Take 80 mg by mouth every day.     • aspirin EC (ECOTRIN) 81 MG Tablet Delayed Response Take 1 Tab by mouth every day. 30 Tab 6     No current facility-administered medications for this visit.          Allergies as of 06/04/2019 - Reviewed 06/04/2019   Allergen Reaction Noted   • Cefdinir Shortness of Breath and Itching 03/01/2016   • Depakote [divalproex sodium] Unspecified 06/14/2010   • Doxycycline Anaphylaxis and Vomiting 08/15/2012   • Abilify Unspecified 01/17/2013   • Amitriptyline Unspecified 10/31/2013   • Amoxicillin Rash 09/18/2010   • Ciprofloxacin Rash 12/17/2009   • Clindamycin Nausea 02/02/2011   • Ees [erythromycin] Vomiting and Nausea 08/28/2010   • Flagyl [metronidazole hcl] Unspecified 03/31/2011   • Flomax [tamsulosin hydrochloride] Swelling 09/24/2009   • Metformin Unspecified 07/23/2013   • Sulfa drugs Hives and Rash 09/18/2010   • Tape Rash 08/15/2012   • Vancomycin Itching 07/10/2016   • Wound dressing adhesive Hives 01/12/2018   • Cephalexin [keflex] Rash 01/01/2017   • Levofloxacin Unspecified 10/27/2016   • Metronidazole Rash 03/30/2017   • Valproic acid Rash 03/30/2017        ROS: Denies Chest pain, SOB, LE edema.    /72 (BP Location: Right arm, Patient Position: Sitting)   Pulse 91   Temp 37.1 °C (98.8 °F)   Ht 1.651 m (5' 5\")   Wt 124.7 kg (275 lb)   LMP 05/27/2019   SpO2 92%   BMI 45.76 kg/m²     Physical Exam:  Gen:         Alert and oriented, No apparent distress.  TM            erythematous bilaterally right greater than left oropharynx with slight posterior erythema  Neck:        No Lymphadenopathy or Bruits.  Lungs:     Clear to auscultation bilaterally  CV:          Regular rate and rhythm. No murmurs, " rubs or gallops.               Ext:          No clubbing, cyanosis, edema.    Rapid strep is negative    Assessment and Plan.   28 y.o. female with the following issues.    1. Sore throat  Given the appearance of the ears have started on antibiotic.  Discussing her multiple antibiotic allergies erythromycin is listed she reported nausea however no shortness of breath or rash.  She is taking the medications recently and denied any difficulty breathing.  Therefore she is starting on Z-Ashley.  - POCT Rapid Strep A  - azithromycin (ZITHROMAX Z-ASHLEY) 250 MG Tab; Take 1 Tab by mouth every day for 5 days. Take 2 tabs on day 1  Dispense: 6 Tab; Refill: 0

## 2019-06-04 NOTE — TELEPHONE ENCOUNTER
Please notify patient of urine culture positive for low-grade urinary tract infection.  Bacteria sensitive to prescribed fosfomycin; use prescription provided.  Follow-up with PCP if symptoms not resolving.

## 2019-06-05 NOTE — TELEPHONE ENCOUNTER
Patient called back regarding lab results. She was happy for the phone call and had no further questions.

## 2019-06-06 ENCOUNTER — OFFICE VISIT (OUTPATIENT)
Dept: BEHAVIORAL HEALTH | Facility: CLINIC | Age: 29
End: 2019-06-06
Payer: MEDICARE

## 2019-06-06 VITALS
RESPIRATION RATE: 14 BRPM | BODY MASS INDEX: 45.98 KG/M2 | HEART RATE: 88 BPM | WEIGHT: 276 LBS | SYSTOLIC BLOOD PRESSURE: 112 MMHG | HEIGHT: 65 IN | DIASTOLIC BLOOD PRESSURE: 74 MMHG

## 2019-06-06 DIAGNOSIS — F25.1 SCHIZOAFFECTIVE DISORDER, DEPRESSIVE TYPE (HCC): ICD-10-CM

## 2019-06-06 DIAGNOSIS — F60.9 PERSONALITY DISORDER (HCC): ICD-10-CM

## 2019-06-06 DIAGNOSIS — G47.00 INSOMNIA, UNSPECIFIED TYPE: ICD-10-CM

## 2019-06-06 PROCEDURE — 99213 OFFICE O/P EST LOW 20 MIN: CPT | Performed by: PSYCHIATRY & NEUROLOGY

## 2019-06-06 PROCEDURE — 90833 PSYTX W PT W E/M 30 MIN: CPT | Performed by: PSYCHIATRY & NEUROLOGY

## 2019-06-06 ASSESSMENT — PATIENT HEALTH QUESTIONNAIRE - PHQ9: CLINICAL INTERPRETATION OF PHQ2 SCORE: 0

## 2019-06-06 NOTE — BH THERAPY
[facility]  PSYCHIATRIC FOLLOW-UP NOTE    Name: Kristin Balderrama  MRN: [id number]  : 1990  Age: 28 y.o.  Date of assessment: 2019  PCP: Torres Brody M.D.  Persons in attendance: Patient  Total face-to-face time: 30 minutes    REASON FOR VISIT/CHIEF COMPLAINT (as stated by Patient):  Kristin Balderrama is a 28 y.o., White female, attending follow-up appointment for   Chief Complaint   Patient presents with   • Medication Management     The patient is seen today for follow up on her schizoaffective disorder, perosnality disorder, and insomnia.    .      HISTORY OF PRESENT ILLNESS:    This is a 27 yo female, came for follow up with out a walker. The patient was admitted to [facility] and she was treated with steroid. The patient had optic nuritis and she was treated with IV solu medrol. The patient was on oral steroid and stopped it yesterday. The patient reported mild eye pain that started today and she agreed to call Dr. Yousif. The patient reported that this is the third time she is getting this. The patient had a lumbar tab and that was unremarkable for MS.     PSYCHOSOCIAL CHANGES SINCE PREVIOUS CONTACT:  The patient is stressed ou but her mood has been stable.    RESPONSE TO TREATMENT:  She is taking geodon 80 mg two at night, fluoxetine 10 mg one a day, buspar 5 mg bid, and trazodone 100 mg one at night.    MEDICATION SIDE EFFECTS:  Weight gain.    REVIEW OF SYSTEMS:        Constitutional positive - obesity   Eyes positive - optic neuritis.   Ears/Nose/Mouth/Throat negative   Cardiovascular positive - hypertension, sinus tachy.   Respiratory positive - obstructive sleep apnea.   Gastrointestinal negative   Genitourinary positive - polycystic ovarian syndrome.   Muscular positive - none.   Integumentary positive - chronic inflammatory arthritis.   Neurological positive - neuropathy   Endocrine positive - diabetes, hypothyroidism.   Hematologic/Lymphatic negative       PSYCHIATRIC  "EXAMINATION/MENTAL STATUS  /74   Pulse 88   Resp 14   Ht 1.651 m (5' 5\")   Wt (!) 125.2 kg (276 lb)   LMP 05/27/2019   BMI 45.93 kg/m²   Participation: Active verbal participation and Attentive  Grooming:Casual  Orientation: Alert and Fully Oriented  Eye contact: Good  Behavior:Calm   Mood: Anxious  Affect: Flexible and Full range  Thought process: Logical and Goal-directed  Thought content:  Preoccupation, Rumination and Obsessions  Speech: Rate within normal limits and Volume within normal limits  Perception:  Within normal limits  Memory:  No gross evidence of memory deficits  Insight: Good  Judgment: Good  Family/couple interaction observations:   Other:    Current risk:    Suicide: Low   Homicide: Low   Self-harm: Low  Relapse: Low  Other:   Crisis Safety Plan reviewed?Yes  If evidence of imminent risk is present, intervention/plan:    Medical Records/Labs/Diagnostic Tests Reviewed: yes.    Medical Records/Labs/Diagnostic Tests Ordered: none.    DIAGNOSTIC IMPRESSION(S):  1. Schizoaffective disorder, depressive type (HCC)    2. Personality disorder (HCC)    3. Insomnia, unspecified type           ASSESSMENT AND PLAN:    1. Schizoaffective disorder  2. Personality disorder.  Geodon 80 mg two at bed time.  Fluoxetine 10 mg one a day  Buspar 5 mg bid.    3. Insomnia.  Trazodone 100 mg one at bed time    4. Obesity  Diet and exercise.    5. Follow up in two months and we discussed there will be a change in provider at that time.    17 minutes were spent in psychotherapy.  (If greater than 16 minutes, add [id number] code)   Topics addressed in psychotherapy include:  We discussed her unresolved emotional issues and helped her with emotioanl skills.  Cognitive restructuring and behavioral changes.  Encouraged her to practice mediation and relaxation method.  Ronny Burnette M.D.                   "

## 2019-06-08 ENCOUNTER — APPOINTMENT (OUTPATIENT)
Dept: RADIOLOGY | Facility: MEDICAL CENTER | Age: 29
End: 2019-06-08
Attending: EMERGENCY MEDICINE
Payer: MEDICARE

## 2019-06-08 ENCOUNTER — HOSPITAL ENCOUNTER (EMERGENCY)
Facility: MEDICAL CENTER | Age: 29
End: 2019-06-08
Attending: EMERGENCY MEDICINE
Payer: MEDICARE

## 2019-06-08 VITALS
HEIGHT: 65 IN | TEMPERATURE: 99.3 F | SYSTOLIC BLOOD PRESSURE: 139 MMHG | OXYGEN SATURATION: 98 % | HEART RATE: 74 BPM | BODY MASS INDEX: 45.98 KG/M2 | RESPIRATION RATE: 18 BRPM | DIASTOLIC BLOOD PRESSURE: 82 MMHG | WEIGHT: 276 LBS

## 2019-06-08 DIAGNOSIS — S93.401A SPRAIN OF RIGHT ANKLE, UNSPECIFIED LIGAMENT, INITIAL ENCOUNTER: ICD-10-CM

## 2019-06-08 PROCEDURE — 96372 THER/PROPH/DIAG INJ SC/IM: CPT

## 2019-06-08 PROCEDURE — 73610 X-RAY EXAM OF ANKLE: CPT | Mod: RT

## 2019-06-08 PROCEDURE — 700111 HCHG RX REV CODE 636 W/ 250 OVERRIDE (IP): Performed by: EMERGENCY MEDICINE

## 2019-06-08 PROCEDURE — 99284 EMERGENCY DEPT VISIT MOD MDM: CPT

## 2019-06-08 RX ORDER — KETOROLAC TROMETHAMINE 30 MG/ML
60 INJECTION, SOLUTION INTRAMUSCULAR; INTRAVENOUS ONCE
Status: COMPLETED | OUTPATIENT
Start: 2019-06-08 | End: 2019-06-08

## 2019-06-08 RX ORDER — IBUPROFEN 800 MG/1
800 TABLET ORAL EVERY 8 HOURS PRN
Qty: 30 TAB | Refills: 0 | Status: SHIPPED | OUTPATIENT
Start: 2019-06-08 | End: 2019-06-29 | Stop reason: CLARIF

## 2019-06-08 RX ADMIN — KETOROLAC TROMETHAMINE 60 MG: 30 INJECTION, SOLUTION INTRAMUSCULAR; INTRAVENOUS at 07:40

## 2019-06-08 NOTE — ED TRIAGE NOTES
"Kristin Balderrama   28 y.o. female   Chief Complaint   Patient presents with   • Ankle Pain     fell at 0500 this morning, twisted her ankle and fell on the right knee      Pt to triage in WC for above complaint. Right ankle is slightly swollen and tender to touch. + CMS to extremity.     Pt is alert and oriented, speaking in full sentences, follows commands and responds appropriately to questions. NAD. Resp are even and unlabored.   Pt placed in lobby. Pt educated on triage process. Pt encouraged to alert staff for any changes.    /82   Pulse 80   Temp 37.4 °C (99.3 °F) (Temporal)   Resp 16   Ht 1.651 m (5' 5\")   Wt (!) 125.2 kg (276 lb)   LMP 05/27/2019   SpO2 98%   BMI 45.93 kg/m²     "

## 2019-06-08 NOTE — DISCHARGE INSTRUCTIONS
Ice for 24 hours then warm soaks in Epsom salts 2 times daily  Wear the Aircast splint at all times except for bathing for the next 2 weeks.  Be very cautious about getting up so that you do not fall again.  Take the medications I am prescribing with food for pain and follow-up with your primary care provider within the week for recheck.

## 2019-06-08 NOTE — ED PROVIDER NOTES
"CHIEF COMPLAINT  Chief Complaint   Patient presents with   • Ankle Pain     fell at 0500 this morning, twisted her ankle and fell on the right knee       HPI  Kristin Balderrama is a 28 y.o. female who presents with complaints of right sided ankle pain that started this morning at 5 AM. The patient was getting up to get something to eat when she tripped on carpet. She states that the pain shoots up to her knee, but denies knee pain. The patient describes her pain as a \"numbness\". The patient states that she has a history of diabetes. The patient denies taking any medication prior to arrival at the ED.    REVIEW OF SYSTEMS  See HPI for further details. All other systems are negative.    PAST MEDICAL HISTORY  Past Medical History:   Diagnosis Date   • Abdominal pain    • Anginal syndrome     random chest pain especially with tachycardia   • Apnea, sleep    • Arrhythmia     \"sinus tachycardia\", cariologist, Dr. Kumar; ablation 2/2016   • Arthritis     osteo   • ASTHMA     when around smoke   • Atrial fibrillation (HCC)    • Back pain    • Borderline personality disorder (HCC)    • Breath shortness     with tachycardia   • Bronchitis    • Cardiac arrhythmia    • Chickenpox    • Chronic UTI 9/18/2010   • Cough    • Daytime sleepiness    • Depression    • Diabetes (HCC)    • Diarrhea    • Disorder of thyroid    • Fall    • Fatigue    • Frequent headaches    • Gasping for breath    • Gynecological disorder     PCOS   • Headache(784.0)    • Heart burn    • History of falling    • Hypertension    • Indigestion    • Migraine    • 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HCC)    • 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HCC)    • Nausea    • Obesity    • Pain 08-15-12    back, 7/10   • Painful joint    • PCOS (polycystic ovarian syndrome)    • Pneumonia 2012   • Psychosis (HCC)    • Renal disorder     \"kidney disease, stage 1\" nephrologist, Dr. 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   • Urinary bladder disorder     Suprapubic cath   • Urinary incontinence    • Weakness    • Wears glasses        FAMILY HISTORY  Family History   Problem Relation Age of Onset   • Hypertension Mother    • Sleep Apnea Mother    • Heart Disease Mother    • Other Mother         hypothryod   • Hypertension Maternal Uncle    • Heart Disease Maternal Grandmother    • Hypertension Maternal Grandmother    • No Known Problems Sister    • Other Sister         Narcolepsy;fibromyalsia;bone;nerve   • Genitourinary () Sister         endometriosis       SOCIAL HISTORY  Social History     Social History   • Marital status: Single     Spouse name: N/A   • Number of children: N/A   • Years of education: N/A     Social History Main Topics   • Smoking status: Never Smoker   • Smokeless tobacco: Never Used   • Alcohol use No   • Drug use: Yes     Frequency: 7.0 times per week     Types: Marijuana, Oral      Comment: Medicinal edible's   • Sexual activity: Not Currently     Birth control/ protection: Pill     Other Topics Concern   • Not on file     Social History Narrative    ** Merged History Encounter **            SURGICAL HISTORY  Past Surgical History:   Procedure Laterality Date   • MUSCLE BIOPSY Right 1/26/2017    Procedure: MUSCLE BIOPSY - THIGH;  Surgeon: Isidro Vigil M.D.;  Location: SURGERY [facility];  Service:    • GASTROSCOPY WITH BALLOON DILATATION N/A 1/4/2017    Procedure: GASTROSCOPY WITH DILATATION;  Surgeon: Torres Vargas M.D.;  Location: SURGERY [facility];  Service:    • BOWEL STIMULATOR INSERTION  7/13/2016    Procedure: BOWEL STIMULATOR INSERTION FOR PERMANENT INTERSTIM SACRAL IMPLANT;  Surgeon: Joe Noyola M.D.;  Location: SURGERY [facility];  Service:    • RECOVERY  1/27/2016    Procedure: CATH LAB EP STUDY WITH SINUS NODE " "ELLA LA;  Surgeon: Recoveryonly Surgery;  Location: SURGERY PRE-POST PROC UNIT [facility];  Service:    • OTHER CARDIAC SURGERY  1/2016    cardiac ablation   • NEURO DEST FACET L/S W/IG SNGL  4/21/2015    Performed by Reza Tabor at SURGERY [facility]   • LUMBAR FUSION ANTERIOR  8/21/2012    Performed by SUSIE SAWANT at SURGERY [facility]S   • ALYSSA BY LAPAROSCOPY  8/29/2010    Performed by SHAYY JOHNS at SURGERY [facility] ORS   • LAMINOTOMY     • OTHER ABDOMINAL SURGERY     • TONSILLECTOMY      tonsillectomy       CURRENT MEDICATIONS  See chart    ALLERGIES  Allergies   Allergen Reactions   • Cefdinir Shortness of Breath and Itching     Tolerated 1/18/17  Tolerates ceftriaxone    • Depakote [Divalproex Sodium] Unspecified     Muscle spasms/muscle pain and weakness     • Doxycycline Anaphylaxis and Vomiting     RXN=unknown   • Abilify Unspecified     Headaches/muscle twitching     • Amitriptyline Unspecified     Headaches     • Amoxicillin Rash     Pt states \"I get a rash\".     • Ciprofloxacin Rash     Pt states \"I get a rash\".     • Clindamycin Nausea     Even with food     • Ees [Erythromycin] Vomiting and Nausea   • Flagyl [Metronidazole Hcl] Unspecified     \"eye problems\"     • Flomax [Tamsulosin Hydrochloride] Swelling   • Metformin Unspecified     Increased lactic acid      • Sulfa Drugs Hives and Rash     RXN=since childhood  PATIENT DID FINE WITH BACTRIM X 2 COURSES 10/2018   • Tape Rash     Tears skin off   coban with Tegaderm tape ok  RXN=ongoing   • Vancomycin Itching     Pt becomes flushed in face and chest.   RXN=7/10/16   • Wound Dressing Adhesive Hives     By pt report   • Cephalexin [Keflex] Rash     Pt states she gets a rash with this medication  Tolerates ceftriaxone   • Levofloxacin Unspecified     Leg muscle cramps   • Metronidazole Rash     .   • Valproic Acid Rash     .       PHYSICAL EXAM  VITAL SIGNS: /82   Pulse 80   Temp 37.4 °C (99.3 °F) (Temporal)   " "Resp 16   Ht 1.651 m (5' 5\")   Wt (!) 125.2 kg (276 lb)   LMP 05/27/2019   SpO2 98%   BMI 45.93 kg/m²     Constitutional: Patient is well developed, well nourished in mild distress.  Morbidly obese  HENT: Normocephalic, atraumatic, nose normal with no mucosal edema or drainage.   Eyes: PERRL, EOMI, Conjunctiva without erythema.   Neck: Supple with Normal range of motion in flexion, extension and lateral rotation. No tenderness along the bony prominences or paraspinal muscles.   Lymphatic: No lymphadenopathy noted.   Cardiovascular: Normal heart rate and rhythm. No murmur.  Thorax & Lungs: Clear and equal breath sounds with good excursion. No respiratory distress.  Abdomen: Bowel sounds normal in all four quadrants. Soft,nontender, no rebound , guarding, palpable masses.   Skin: Warm, Dry, No erythema, Pale with no rashes or abrasions   Back: No cervical, thoracic, or lumbosacral tenderness. No CVA tenderness.   Extremities: Peripheral pulses 4/4 No pretibial edema, No tenderness.   Musculoskeletal: Moderate soft tissue swelling on right malleolus with palpable tenderness up to the knee. Right hand and wirst in a splint neurovascular intact  Neurologic: Alert & oriented x 3, Normal motor function, Normal sensory function, No lateralizing or focal deficits noted. DTR's 4/4 bilaterally.  Psychiatric: Affect normal, Judgment normal, Mood normal.     RADIOLOGY/PROCEDURES  DX-ANKLE 3+ VIEWS RIGHT   Final Result      Negative for right ankle fracture or malalignment          COURSE & MEDICAL DECISION MAKING  Pertinent Labs & Imaging studies reviewed. (See chart for details)      7:41 AM- I evaluated the patient at bedside. We discussed the need for imaging studies including DX-ankle. The patient will be treated with Toradol 60 mg IV    8:56 AM- I reevaluated the patient at bedside. She was updated with the findings of her workup. The patient was given the plan for at home care, along with strict return precautions and " follow up. The patient's questions were answered and she is agreeable to discharge at this time.  X-rays were negative for any fracture or subluxation.  She was placed in Aircast splint but she does have a splint that she wears for chronic foot drop anyway.  I have instructed her to wear the Aircast splint for the next 2 to 3 days, use a walker or a wheelchair that she Goldy has at home to stay off of the foot until the pain improves.  Then she can go back to her normal splint.  She was given a prescription for Motrin 800 mg.  She is to take that with food and follow-up with her primary care provider within a week for recheck.  She is discharged in stable and improved condition.      FINAL IMPRESSION  1. Sprain of right ankle, unspecified ligament, initial encounter        PRESCRIPTIONS  New Prescriptions    IBUPROFEN (MOTRIN) 800 MG TAB    Take 1 Tab by mouth every 8 hours as needed for Moderate Pain.       FOLLOW UP  Torres Brody M.D.  [address] [place] [id number]  [phone]    Schedule an appointment as soon as possible for a visit in 1 week  For recheck        -DISCHARGE-       Vance RODRIGUEZ (Eva), am scribing for, and in the presence of, Sonal Bryant D.O..    Electronically signed by: Vance Sales (Eva), 6/8/2019    Sonal RODRIGUEZ D.O. personally performed the services described in this documentation, as scribed by Vance Sales in my presence, and it is both accurate and complete.

## 2019-06-08 NOTE — ED NOTES
"Pt states \"I tripped over the carpet and then couldn't put weight on my ankle and it hurt.\" swelling noted.  "

## 2019-06-08 NOTE — ED NOTES
Pt aox4, pt assisted to wheelchair and assisted in vehicle home via tech. Pt given motrin prescription. Pt understands to follow up with PT. Pt grandmother educated on splint application. No further questions.

## 2019-06-10 ENCOUNTER — PHYSICAL THERAPY (OUTPATIENT)
Dept: PHYSICAL THERAPY | Facility: REHABILITATION | Age: 29
End: 2019-06-10
Attending: INTERNAL MEDICINE
Payer: MEDICARE

## 2019-06-10 DIAGNOSIS — M25.511 CHRONIC RIGHT SHOULDER PAIN: ICD-10-CM

## 2019-06-10 DIAGNOSIS — G89.29 CHRONIC RIGHT SHOULDER PAIN: ICD-10-CM

## 2019-06-10 DIAGNOSIS — M72.2 PLANTAR FASCIITIS: ICD-10-CM

## 2019-06-10 PROCEDURE — 97110 THERAPEUTIC EXERCISES: CPT

## 2019-06-10 NOTE — OP THERAPY DISCHARGE SUMMARY
Outpatient Physical Therapy  DISCHARGE SUMMARY NOTE      [facility] Physical Therapy [address]  [address]  [place] [address]  Phone:  [phone]  Fax:  [phone]    Date of Visit: 06/10/2019    Patient: Kristin Balderrama  YOB: 1990  MRN: [id number]     Referring Provider: Torres Brody M.D.  [address] [place] [address] [address]  [place] [address]   Referring Diagnosis Other chronic pain [G89.29];Pain in right shoulder [M25.511]     Physical Therapy Occurrence Codes    Date of onset of impairment:  4/24/19   Date physical therapy care plan established or reviewed:  4/29/19   Date physical therapy treatment started:  4/29/19          Functional Limitations and Severity Modifiers          Your patient is being discharged from Physical Therapy with the following comments:   · Goals partially met    Comments:  R shoulder and L foot pain have improved significantly since initial evaluation. Patient currently most limited by her acute R ankle sprain (on 6/8/2019).     Limitations Remaining:  Transfers, WB, gait    Recommendations:  Given the patient's difficult in navigating her home environment given size of doorframes, her difficulty WB due to R ankle pain, and her frequent falls at home resulting in injury, recommend home PT, if eligible, for evaluation of R ankle pain and to address barriers to safe gait, transfers, ambulation in the home environment.       Ursula Escalera, PT    Date: 6/10/2019

## 2019-06-10 NOTE — OP THERAPY DAILY TREATMENT
Outpatient Physical Therapy  DAILY TREATMENT     [facility] Physical [address]  [address]  [place] [address]  Phone:  [phone]  Fax:  [phone]    Date: 06/10/2019    Patient: Kristin Balderrama  YOB: 1990  MRN: [id number]     Time Calculation  Start time: 1100  Stop time: 1130 Time Calculation (min): 30 minutes     Chief Complaint: Ankle Injury    Visit #: 4    SUBJECTIVE:  The patient returns for follow up after missing several appointments due to acute care admission for optic neuritis (5/27/2019 - 5/30/2019), currently on high dose steroids taper.She has also had bronchitis for several weeks, treated with antibiotics, now resolved.     Her L foot pain that initially brought her to PT has resolved, as has her R shoulder pain, and for the past couple of weeks she has been using her bariatric walker in the community, and furniture walking at home (bariatric walker doesn't fit through doorframes in her home).    However, on 6/8/2019, she got up at home and tripped on the rug. She was not wearing her R AFO at the time. She twisted her R ankle and re-injured her R wrist. She went ER and they gave her a splint for her ankle. She does not currently have an order for PT for her R ankle sprain.     She is now using her small wheelchair at home. Using the bedside commode currently and feels unsafe transferring to commode because of R ankle pain.  Also notes that she needs a wound care referral from her Doctor as her recurrent problem of sores on L breast has worsened (followed by Dermatology for this).       OBJECTIVE:  Current objective measures:         X-Ray R ankle 6/8/2019  Impression      Negative for right ankle fracture or malalignment           Therapeutic Exercises (CPT 94582):     1. Review of HEP    2. Seated scapular adduction/depressoin    3. Bilateral shoulder flexion AROM    4. Foot doming    5. Gastroc stretch seated    Therapeutic Treatments and  Modalities:     1. Therapeutic Activities (CPT 48549)    Therapeutic Treatment and Modalities Summary: Discussed strategies for safe transfers, utility of having home PT to readdress safety and ambulation in home given the patient's reported inability to use bariatric walker within the house due to small doorframes, judgement. Sit <>stand from WC, patient unable to WB R on full foot due to R ankle pain.     Time-based treatments/modalities:  Therapeutic exercise minutes (CPT 40214): 12 minutes  Therapeutic activity minutes (CPT 84990): 10 minutes         ASSESSMENT:   Response to treatment: Discharge due to partially accomplished goals and  Change in medical status.     PLAN/RECOMMENDATIONS:   Plan for treatment: discharge patient due to accomplished goals.

## 2019-06-11 ENCOUNTER — OFFICE VISIT (OUTPATIENT)
Dept: MEDICAL GROUP | Facility: MEDICAL CENTER | Age: 29
End: 2019-06-11
Payer: MEDICARE

## 2019-06-11 ENCOUNTER — TELEPHONE (OUTPATIENT)
Dept: MEDICAL GROUP | Facility: MEDICAL CENTER | Age: 29
End: 2019-06-11

## 2019-06-11 VITALS
SYSTOLIC BLOOD PRESSURE: 124 MMHG | TEMPERATURE: 99.4 F | DIASTOLIC BLOOD PRESSURE: 68 MMHG | HEART RATE: 89 BPM | HEIGHT: 65 IN | RESPIRATION RATE: 16 BRPM | OXYGEN SATURATION: 91 % | BODY MASS INDEX: 45.93 KG/M2

## 2019-06-11 DIAGNOSIS — M25.579 ACUTE ANKLE PAIN, UNSPECIFIED LATERALITY: ICD-10-CM

## 2019-06-11 DIAGNOSIS — N39.0 RECURRENT UTI: ICD-10-CM

## 2019-06-11 DIAGNOSIS — M25.572 ACUTE LEFT ANKLE PAIN: ICD-10-CM

## 2019-06-11 DIAGNOSIS — S21.009D BREAST WOUND, UNSPECIFIED LATERALITY, SUBSEQUENT ENCOUNTER: ICD-10-CM

## 2019-06-11 PROCEDURE — 99214 OFFICE O/P EST MOD 30 MIN: CPT | Performed by: INTERNAL MEDICINE

## 2019-06-11 RX ORDER — SULFAMETHOXAZOLE AND TRIMETHOPRIM 800; 160 MG/1; MG/1
1 TABLET ORAL 2 TIMES DAILY
Qty: 6 TAB | Refills: 0 | Status: SHIPPED | OUTPATIENT
Start: 2019-06-11 | End: 2019-06-14

## 2019-06-11 RX ORDER — PHENAZOPYRIDINE HYDROCHLORIDE 200 MG/1
200 TABLET, FILM COATED ORAL 3 TIMES DAILY PRN
Qty: 30 TAB | Refills: 2 | Status: SHIPPED | OUTPATIENT
Start: 2019-06-11 | End: 2019-08-29

## 2019-06-11 NOTE — TELEPHONE ENCOUNTER
VOICEMAIL  1. Caller Name: Kristin                      Call Back Number: [phone] (home)       2. Message: Requesting that the PT referral be updated to say Right Ankle. Currently has left.     3. Patient approves office to leave a detailed voicemail/MyChart message: N\A

## 2019-06-11 NOTE — PROGRESS NOTES
CC: Dysuria, breast wound, ankle pain.                                                                                                                                      HPI:   Kristin presents today with the following.    1. Recurrent UTI  Presents with recurrent UTIs recent urinalysis shows Proteus Mirabella's roughly 50,000 colonies.  She is having persistent pain is not yet been treated.  No fevers chills or flank pain no blood in the urine she has taken Bactrim on several occasions has not had any side effects although was listed as an allergic reaction    2. Breast wound, unspecified laterality, subsequent encounter  She does have persistent wounds of the breast requesting referral to wound center as they will not heal.    3. Acute left ankle pain  Recent fall seen in the emergency room ankle is splinted she is requesting referral to PT.      Patient Active Problem List    Diagnosis Date Noted   • Left leg weakness 07/14/2018     Priority: High   • Controlled type 2 diabetes mellitus without complication, without long-term current use of insulin ([facility]) 04/26/2017     Priority: High   • Sinus tachycardia 10/31/2013     Priority: High   • Chronic inflammatory arthritis 05/23/2016     Priority: Medium   • Morbid obesity with BMI of 45.0-49.9, adult ([facility]) 10/24/2017     Priority: Low   • Depression 10/28/2016     Priority: Low   • Schizophrenia ([facility]) 10/27/2016     Priority: Low   • PCOS (polycystic ovarian syndrome) 11/23/2015     Priority: Low   • Progressive focal motor weakness 06/28/2015     Priority: Low   • Fatty liver disease, nonalcoholic 01/19/2015     Priority: Low   • Anxiety 12/16/2014     Priority: Low   • Knee pain, right 02/13/2014     Priority: Low   • Neurogenic bladder 04/02/2011     Priority: Low   • Recurrent UTI 09/18/2010     Priority: Low   • Borderline personality disorder in adult ([facility]) 09/18/2010     Priority: Low   • Constipation 05/29/2019   • Weakness 05/29/2019   • Optic  neuritis 05/27/2019   • Inappropriate sinus tachycardia 04/10/2019   • Asymptomatic bacteriuria 10/26/2018   • Palpitations 10/01/2018   • Chronic respiratory failure with hypoxia, on home oxygen therapy ([facility]) 08/08/2018   • Transaminitis 08/08/2018   • TONYA (obstructive sleep apnea) 01/09/2018   • Functional diarrhea 01/05/2018   • Breast wound 11/06/2017   • Intractable episodic cluster headache 09/14/2017   • Rash 06/09/2017   • Hashimoto's encephalopathy 05/17/2017   • Fall at home 05/13/2017   • On home oxygen therapy 04/15/2017   • Weakness of right upper extremity 02/23/2017   • Chronic suprapubic catheter ([facility]) 02/16/2017   • Hypovitaminosis D 11/29/2016   • Chronic pain syndrome 10/27/2016   • Bowel and bladder incontinence 10/27/2016   • Galactorrhea 07/22/2016   • Elevated sedimentation rate 06/27/2016   • Weakness of both lower extremities 06/22/2016   • Vitamin D deficiency 05/21/2016   • Morbidly obese ([facility]) 03/07/2016   • Scoliosis 03/07/2016   • GERD (gastroesophageal reflux disease) 03/07/2016   • Peripheral neuropathy ([facility]) 03/06/2016   • H/O prior ablation treatment 02/10/2016       Current Outpatient Prescriptions   Medication Sig Dispense Refill   • phenazopyridine (PYRIDIUM) 200 MG Tab Take 1 Tab by mouth 3 times a day as needed. 30 Tab 2   • sulfamethoxazole-trimethoprim (BACTRIM DS) 800-160 MG tablet Take 1 Tab by mouth 2 times a day for 3 days. 6 Tab 0   • ibuprofen (MOTRIN) 800 MG Tab Take 1 Tab by mouth every 8 hours as needed for Moderate Pain. 30 Tab 0   • Ivabradine HCl 7.5 MG Tab Take 7.5 mg by mouth 2 Times a Day.     • vitamin D, Ergocalciferol, (DRISDOL) 57558 units Cap capsule Take 50,000 Units by mouth every Friday.     • norethindrone (DEBLITANE) 0.35 MG tablet Take 0.35 mg by mouth every day.     • ziprasidone (GEODON) 80 MG Cap Take 80 mg by mouth 2 Times a Day.     • methocarbamol (ROBAXIN) 750 MG Tab Take 750 mg by mouth 3 times a day.     • Diclofenac Sodium (VOLTAREN) 1  "% Gel Apply 1 Inch to skin as directed 4 times a day. 5 Tube 6   • sodium bicarbonate (SODIUM BICARBONATE) 650 MG Tab Take 1 Tab by mouth 2 times a day. 360 Tab 3   • FLUoxetine (PROZAC) 10 MG Cap TAKE ONE CAPSULE BY MOUTH ONCE A DAY 30 Cap 2   • traZODone (DESYREL) 100 MG Tab TAKE  ONE TABLET BY MOUTH NIGHTLY AT BEDTIME 90 Tab 2   • Diphenhydramine-APAP, sleep, (TYLENOL PM EXTRA STRENGTH PO) Take 1 Cap by mouth every day.     • LYRICA 300 MG capsule Take 300 mg by mouth 2 times a day.     • busPIRone (BUSPAR) 5 MG tablet Take 1 Tab by mouth 2 times a day. 60 Tab 5   • furosemide (LASIX) 40 MG Tab Take 80 mg by mouth every day.     • aspirin EC (ECOTRIN) 81 MG Tablet Delayed Response Take 1 Tab by mouth every day. 30 Tab 6     No current facility-administered medications for this visit.          Allergies as of 06/11/2019 - Reviewed 06/11/2019   Allergen Reaction Noted   • Cefdinir Shortness of Breath and Itching 03/01/2016   • Depakote [divalproex sodium] Unspecified 06/14/2010   • Doxycycline Anaphylaxis and Vomiting 08/15/2012   • Abilify Unspecified 01/17/2013   • Amitriptyline Unspecified 10/31/2013   • Amoxicillin Rash 09/18/2010   • Ciprofloxacin Rash 12/17/2009   • Clindamycin Nausea 02/02/2011   • Ees [erythromycin] Vomiting and Nausea 08/28/2010   • Flagyl [metronidazole hcl] Unspecified 03/31/2011   • Flomax [tamsulosin hydrochloride] Swelling 09/24/2009   • Metformin Unspecified 07/23/2013   • Tape Rash 08/15/2012   • Vancomycin Itching 07/10/2016   • Wound dressing adhesive Hives 01/12/2018   • Cephalexin [keflex] Rash 01/01/2017   • Levofloxacin Unspecified 10/27/2016   • Metronidazole Rash 03/30/2017   • Valproic acid Rash 03/30/2017        ROS: Denies Chest pain, SOB, LE edema.    /68 (BP Location: Left arm, Patient Position: Sitting, BP Cuff Size: Adult) Comment (BP Location): forearm  Pulse 89   Temp 37.4 °C (99.4 °F) (Temporal)   Resp 16   Ht 1.651 m (5' 5\")   LMP 05/27/2019   SpO2 91% "   BMI 45.93 kg/m²     Physical Exam:  Gen:         Alert and oriented, No apparent distress.  Neck:        No Lymphadenopathy or Bruits.  Lungs:     Clear to auscultation bilaterally  CV:          Regular rate and rhythm. No murmurs, rubs or gallops.               Ext:          No clubbing, cyanosis, edema.      Assessment and Plan.   28 y.o. female with the following issues.    1. Recurrent UTI  Have given Pyridium and a 3-day course of Bactrim.  - phenazopyridine (PYRIDIUM) 200 MG Tab; Take 1 Tab by mouth 3 times a day as needed.  Dispense: 30 Tab; Refill: 2  - sulfamethoxazole-trimethoprim (BACTRIM DS) 800-160 MG tablet; Take 1 Tab by mouth 2 times a day for 3 days.  Dispense: 6 Tab; Refill: 0    2. Breast wound, unspecified laterality, subsequent encounter  Breast.  - REFERRAL TO WOUND CLINIC    3. Acute left ankle pain  Placed referral to PT.  - REFERRAL TO PHYSICAL THERAPY Reason for Therapy: Eval/Treat/Report

## 2019-06-17 ENCOUNTER — PHYSICAL THERAPY (OUTPATIENT)
Dept: PHYSICAL THERAPY | Facility: REHABILITATION | Age: 29
End: 2019-06-17
Attending: INTERNAL MEDICINE
Payer: MEDICARE

## 2019-06-17 DIAGNOSIS — M25.579 ACUTE ANKLE PAIN, UNSPECIFIED LATERALITY: ICD-10-CM

## 2019-06-17 PROCEDURE — 97161 PT EVAL LOW COMPLEX 20 MIN: CPT

## 2019-06-17 SDOH — ECONOMIC STABILITY: GENERAL: QUALITY OF LIFE: GOOD

## 2019-06-17 NOTE — OP THERAPY EVALUATION
Outpatient Physical Therapy  INITIAL EVALUATION    [facility] [address]  [address]  [place] [address]  Phone:  [phone]  Fax:  [phone]    Date of Evaluation: 2019    Patient: Kristin Balderrama  YOB: 1990  MRN: [id number]     Referring Provider: Torres Brody M.D.  [address] [place] [address] [id number]  [place] [address]   Referring Diagnosis Acute ankle pain, unspecified laterality [M25.579]     Time Calculation  Start time: 1130  Stop time: 1205 Time Calculation (min): 35 minutes     Physical Therapy Occurrence Codes    Date of onset of impairment:  19   Date physical therapy care plan established or reviewed:  19   Date physical therapy treatment started:  19          Chief Complaint: No chief complaint on file.    Visit Diagnoses     ICD-10-CM   1. Acute ankle pain, unspecified laterality M25.579         Subjective:   History of Present Illness:     Mechanism of injury:  Patient with history of numerous falls with injuries most recently seen in this clinic 2019 - 6/10/2019 for L>R foot pain and R shoulder/wrist pain. Both of these conditions have since resolved.     However, on 2019, she got up at home and tripped on the rug. She was not wearing her R AFO at the time. She twisted her R ankle and re-injured her R wrist. She went ER and they gave her a splint for her ankle.    She went to the [facility] last week where they diagnosed ankle sprain with ligamentous tear ([facility] advised conservative treatment - non-surgical), and provided her with a CAM boot. Patient reports that she is now using manual wheelchair in her home at all times. As before, she owns a bariatric walker but this does not fit through doorways in her home so at baseline she furniture walks.           Quality of life:  Good  Prior level of function:  On disability   Sleep disturbance:  Not disrupted  Pain:     Current pain ratin    At best pain ratin    At worst  "pain ratin    Location:  R lateral ankle    Quality:  Aching    Pain timing:  All day    Relieving factors:  Rest and brace    Aggravating factors:  Activity, standing and walking    Progression:  Improving  Social Support:     Lives in:  One-story house    Lives with: grandmother, uncle.  Treatments:     Previous treatment:  Immobilization    Current treatment:  CAM boot  Patient Goals:     Patient goals for therapy:  Decreased edema, decreased pain, increased strength, increased motion, improved balance and independence with ADLs/IADLs    Other patient goals:  To walk in my house      Past Medical History:   Diagnosis Date   • Abdominal pain    • Anginal syndrome     random chest pain especially with tachycardia   • Apnea, sleep    • Arrhythmia     \"sinus tachycardia\", cariologist, Dr. Kumar; ablation 2016   • Arthritis     osteo   • ASTHMA     when around smoke   • Atrial fibrillation (HCC)    • Back pain    • Borderline personality disorder (HCC)    • Breath shortness     with tachycardia   • Bronchitis    • Cardiac arrhythmia    • Chickenpox    • Chronic UTI 2010   • Cough    • Daytime sleepiness    • Depression    • Diabetes (HCC)    • Diarrhea    • Disorder of thyroid    • Fall    • Fatigue    • Frequent headaches    • Gasping for breath    • Gynecological disorder     PCOS   • Headache(784.0)    • Heart burn    • History of falling    • Hypertension    • Indigestion    • Migraine    • 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HCC)    • 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HCC)    • Nausea    • Obesity    • Pain 08-15-12    back, 7/10   • Painful joint    • PCOS (polycystic ovarian syndrome)    • Pneumonia    • Psychosis (HCC)    • Renal disorder     \"kidney disease, stage 1\" nephrologist, Dr. 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   • Urinary bladder disorder     Suprapubic cath   • Urinary incontinence    • Weakness    • Wears glasses      Past Surgical History:   Procedure Laterality Date   • MUSCLE BIOPSY Right 1/26/2017    Procedure: MUSCLE BIOPSY - THIGH;  Surgeon: Isidro Vigil M.D.;  Location: SURGERY [facility];  Service:    • GASTROSCOPY WITH BALLOON DILATATION N/A 1/4/2017    Procedure: GASTROSCOPY WITH DILATATION;  Surgeon: Torres Vargas M.D.;  Location: SURGERY [facility];  Service:    • BOWEL STIMULATOR INSERTION  7/13/2016    Procedure: BOWEL STIMULATOR INSERTION FOR PERMANENT INTERSTIM SACRAL IMPLANT;  Surgeon: Joe Noyola M.D.;  Location: SURGERY [facility];  Service:    • RECOVERY  1/27/2016    Procedure: CATH LAB EP STUDY WITH SINUS NODE MODIFICATION LA;  Surgeon: Recoveryonly Surgery;  Location: SURGERY PRE-POST PROC UN[facility];  Service:    • OTHER CARDIAC SURGERY  1/2016    cardiac ablation   • NEURO DEST FACET L/S W/IG SNGL  4/21/2015    Performed by Reza Tabor at [facility]   • LUMBAR FUSION ANTERIOR  8/21/2012    Performed by SUSIE SAWANT at SURGERY [facility]   • ALYSSA BY LAPAROSCOPY  8/29/2010    Performed by SHAYY JOHNS at [facility]   • LAMINOTOMY     • OTHER ABDOMINAL SURGERY     • TONSILLECTOMY      tonsillectomy     Social History   Substance Use Topics   • Smoking status: Never Smoker   • Smokeless tobacco: Never Used   • Alcohol use No     Family and Occupational History     Social History   • Marital status: Single     Spouse name: N/A   • Number of children: N/A   • Years of education: N/A       Objective     Neurological Testing     Sensation     Ankle/Foot   Left Ankle/Foot   Intact: light touch    Right Ankle/Foot   Diminished: light touch    Active Range of Motion   Left Ankle/Foot   Dorsiflexion (ke): -5 degrees   Plantar flexion: 82 degrees   Inversion: WFL  Eversion: WFL    Right Ankle/Foot    Dorsiflexion (ke): -10 degrees with pain  Dorsiflexion (kf): with pain  Plantar flexion: 40 degrees with pain  Inversion: WFL and with pain  Eversion: WFL and with pain    Passive Range of Motion   Left Ankle/Foot    Dorsiflexion (ke): -5 degrees   Plantar flexion: 82 degrees   Inversion: WFL  Eversion: WFL  Great toe flexion: WFL  Great toe extension: WFL  Lesser toes: WFL    Right Ankle/Foot    Dorsiflexion (ke): -10 degrees with pain  Plantar flexion: 60 degrees with pain  Inversion: WFL and with pain  Eversion: WFL and with pain  Great toe flexion: WFL  Great toe extension: WFL  Lesser toes: WFL    Joint Play   Left Ankle/Foot  Joints within functional limits are the fibular head, proximal tibiofibular joint, distal tibiofibular joint, talocrural joint, subtalar joint, midfoot and forefoot.     Additional Joint Play Details  Joint mobilization testing deferred to first follow up due to pain and abbreviated exam due to incontinence    Strength:      Left Ankle/Foot   Normal strength    Additional Strength Details  Joint mobilization testing deferred to first follow up due to pain and abbreviated exam due to incontinence    Swelling   Left Ankle/Foot   Figure 8: 57 cm    Right Ankle/Foot   Figure 8: 57 cm  Ambulation     Ambulation: Level Surfaces   Ambulation with assistive device: independent (manual wheelchair)  Ambulation without assistive device: moderate assist    Additional Level Surfaces Ambulation Details  Able to transfer without LOB stand/pivot chair<>mat with and w/o CAM boot    General Comments     Ankle/Foot Comments   Swelling observed postero-lateral R ankle not adequately captured in figure 8 measurement. Additional measurements at first follow up.     Activities of Daily Living:   Transfers/Mobility:     Bed/chair transfers: independent       Bed/chair transfer equipment used: CAM boot       Wheelchair transfer equipment used: CAM boot, arm rests    Sit to stand: independent    Sit to supine:  independent    Toileting:     Toileting assistive device(s): bedside commode    Bathing:     Bathing assistive device(s): tub transfer bench and hand-held shower head    ADL Comments:  Per the patient, her grandmother is assisting her with donning/doffing her CAM boot and with all transfers chair<>commode and chair<>bed        Therapeutic Exercises (CPT 75545):       Therapeutic Exercise Summary: Access Code: [email or web address]   URL: [email or web address]   Date: 06/17/2019   Prepared by: Ursula Escalera      Exercises  · Seated Ankle Circles - 10 reps - 2 sets - 5x daily - 7x weekly  · Seated Ankle Alphabet - 10 reps - 2 sets - 5x daily - 7x weekly  · Isometric Ankle Inversion at Wall - 10 reps - 2 sets - 5 sec hold - 5x daily - 7x weekly  · Isometric Ankle Dorsiflexion and Plantarflexion - 10 reps - 2 sets - 5 sec hold - 1x daily - 7x weekly          Time-based treatments/modalities:          Assessment, Response and Plan:   Impairments: abnormal gait, abnormal muscle firing, abnormal or restricted ROM, impaired functional mobility, impaired balance, impaired physical strength, lacks appropriate home exercise program, limited ADL's, limited mobility, pain with function, safety issue, swelling and weight-bearing intolerance    Assessment details:  28 year old patient presents with subacute R ankle sprain following a fall while not wearing R AFO on 6/8/2019. Patient has had frequent falls in the past. Recent stay in Acute Care for treatment of optic neuritis and recent treatment in PT for L>R Plantar Fascitis and R shoulder pain, now resolved.     The patient will benefit from skilled PT to address associated impairments/limitations and to improve safety. Patient may also benefit from consultation with outpatient SLP to address presumed sequencing/memory and impact of same on safe transfers/mobility.   Barriers to therapy:  Psychosocial and comorbidities  Prognosis: fair    Goals:   Short Term Goals:   Patient is  independent/compliant with HEP  Patient is able to stand for 5 minutes with ankle pain </= 1/10 to facilitate safe transfers  Short term goal time span:  2-4 weeks      Long Term Goals:    LEFS score improved >/= MCID  Patient is able to ambulate 50 feet Kip with least restrictive assistive device without LOB  Patient wears R AFO at all times when ambulating  Long term goal time span:  6-8 weeks  Patient progress towards Long Term goals:  Patient reports no falls    Plan:   Therapy options:  Physical therapy treatment to continue  Planned therapy interventions:  Manual Therapy (CPT 83083), Gait Training (CPT 13302), Neuromuscular Re-education (CPT 74927) and Therapeutic Exercise (CPT 76129)  Frequency:  2x week  Duration in weeks:  6  Discussed with:  Patient      Functional Limitations and Severity Modifiers  Lower Extremity Functional Scale Total: 12.5     Referring provider co-signature:  I have reviewed this plan of care and my co-signature certifies the need for services.  Certification Dates:   From 6/17/2019     To 8/17/2019    Physician Signature: ________________________________ Date: ______________

## 2019-06-18 RX ORDER — BUSPIRONE HYDROCHLORIDE 5 MG/1
TABLET ORAL
Qty: 60 TAB | Refills: 4 | Status: SHIPPED | OUTPATIENT
Start: 2019-06-18 | End: 2019-09-20

## 2019-06-18 ASSESSMENT — ACTIVITIES OF DAILY LIVING (ADL)
AMBULATION_WITHOUT_ASSISTIVE_DEVICE: MODERATE ASSIST
POOR_BALANCE: 1
EQUIPMENT_USED_WHILE_BATHING: TUB TRANSFER BENCH
AMBULATION_WITH_ASSISTIVE_DEVICE: INDEPENDENT
EQUIPMENT_USED_WHILE_BATHING: HAND-HELD SHOWER HEAD

## 2019-06-18 ASSESSMENT — ENCOUNTER SYMPTOMS
PAIN SCALE: 4
PAIN SCALE AT HIGHEST: 9
PAIN LOCATION: R LATERAL ANKLE
ALLEVIATING FACTORS: BRACE
PAIN SCALE AT LOWEST: 4
EXACERBATED BY: WALKING
EXACERBATED BY: STANDING
ALLEVIATING FACTORS: REST
EXACERBATED BY: ACTIVITY
PAIN TIMING: ALL DAY
QUALITY: ACHING

## 2019-06-19 ENCOUNTER — NON-PROVIDER VISIT (OUTPATIENT)
Dept: WOUND CARE | Facility: MEDICAL CENTER | Age: 29
End: 2019-06-19
Attending: INTERNAL MEDICINE
Payer: MEDICARE

## 2019-06-19 ENCOUNTER — APPOINTMENT (OUTPATIENT)
Dept: PHYSICAL THERAPY | Facility: REHABILITATION | Age: 29
End: 2019-06-19
Attending: INTERNAL MEDICINE
Payer: MEDICARE

## 2019-06-19 PROCEDURE — 99211 OFF/OP EST MAY X REQ PHY/QHP: CPT

## 2019-06-19 PROCEDURE — 97597 DBRDMT OPN WND 1ST 20 CM/<: CPT

## 2019-06-19 RX ORDER — PREDNISONE 10 MG/1
10 TABLET ORAL DAILY
Status: ON HOLD | COMMUNITY
End: 2019-07-03

## 2019-06-19 RX ORDER — GABAPENTIN 300 MG/1
300 CAPSULE ORAL 4 TIMES DAILY
COMMUNITY
End: 2019-06-29 | Stop reason: CLARIF

## 2019-06-19 NOTE — CERTIFICATION
"Non Provider Encounter- Partial Thickness wounds    HISTORY OF PRESENT ILLNESS        START OF CARE IN CLINIC: 6/19/19    REFERRING PROVIDER: Torres Brody M.D.   WOUND- Partial thickness wound   LOCATION: bilateral breasts   WOUND HISTORY: Patient states she has had these wounds for over 3 months and can't get them to heal. She tried applying topical antibiotic ointment, medihoney, and then left open to air as she has not been able to afford buying supplies. Patient does not know how they originally started; but has been treated in the past at [facility] for similar wounds, as well as patient states the dermatologist recommended \"steroid shots\" but patient only went once and founds no improvement in one week so did not return.    PERTINENT PMH: Diabetic, previous history of breast wounds, Patient currently on oral steroids & tapering off dose. States her blood sugars are over 200 daily.   IMAGING: none to breasts     LAST  WOUND CULTURE:  DATE :  6/1/19- patient finished taking abx                 DIABETES: yes- Type two  MOST RECENT A1C:  6.1 6/19/19    TOBACCO USE: none; patient uses medical marijuana     FALL RISK ASSESSMENT:   65 years or older     Fall within the last 2 years   xUses ambulatory devices  Loss of protective sensation in feet   Use of prostethic/orthotic    Presence of lower extremity/foot/toe amputation   xTaking medication that increases risk (per facility policy)    Interventions Recommended (if any of the above are selected):   xUse of Assistive Device: wheelchair   Supervision with ambulation: Caregiver   Assistance with ambulation: Caregiver   Home safety education: Educational material provided      PAST MEDICAL HISTORY:   Past Medical History:   Diagnosis Date   • Abdominal pain    • Anginal syndrome     random chest pain especially with tachycardia   • Apnea, sleep    • Arrhythmia     \"sinus tachycardia\", cariologist, Dr. Kumar; ablation 2/2016   • Arthritis     osteo   • ASTHMA     when " "around smoke   • Atrial fibrillation (HCC)    • Back pain    • Borderline personality disorder (HCC)    • Breath shortness     with tachycardia   • Bronchitis    • Cardiac arrhythmia    • Chickenpox    • Chronic UTI 9/18/2010   • Cough    • Daytime sleepiness    • Depression    • Diabetes (HCC)    • Diarrhea    • Disorder of thyroid    • Fall    • Fatigue    • Frequent headaches    • Gasping for breath    • Gynecological disorder     PCOS   • Headache(784.0)    • Heart burn    • History of falling    • Hypertension    • Indigestion    • Migraine    • 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HCC)    • 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HCC)    • Nausea    • Obesity    • Pain 08-15-12    back, 7/10   • Painful joint    • PCOS (polycystic ovarian syndrome)    • Pneumonia 2012   • Psychosis (HCC)    • Renal disorder     \"kidney disease, stage 1\" nephrologist, Dr. 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   • Urinary bladder disorder     Suprapubic cath   • Urinary incontinence    • Weakness    • Wears glasses        PAST SURGICAL HISTORY:   Past Surgical History:   Procedure Laterality Date   • MUSCLE BIOPSY Right 1/26/2017    Procedure: MUSCLE BIOPSY - THIGH;  Surgeon: Isidro Vigil M.D.;  Location: [facility];  Service:    • GASTROSCOPY WITH BALLOON DILATATION N/A 1/4/2017    Procedure: GASTROSCOPY WITH DILATATION;  Surgeon: Torres Vargas M.D.;  Location: [facility];  Service:    • BOWEL STIMULATOR INSERTION  7/13/2016    Procedure: BOWEL STIMULATOR INSERTION FOR PERMANENT INTERSTIM SACRAL IMPLANT;  Surgeon: Joe Noyola M.D.;  Location: [facility];  Service:    • RECOVERY  1/27/2016    Procedure: CATH LAB EP STUDY WITH SINUS NODE MODIFICATION ABHINAV;  Surgeon: Lina" "Surgery;  Location: SURGERY PRE-POST PROC UNIT [facility];  Service:    • OTHER CARDIAC SURGERY  1/2016    cardiac ablation   • NEURO DEST FACET L/S W/IG SNGL  4/21/2015    Performed by Reza Tabor at [facility]   • LUMBAR FUSION ANTERIOR  8/21/2012    Performed by SUSIE SAWANT at [facility]   • ALYSSA BY LAPAROSCOPY  8/29/2010    Performed by SHAYY JOHNS at [facility]   • LAMINOTOMY     • OTHER ABDOMINAL SURGERY     • TONSILLECTOMY      tonsillectomy        MEDICATIONS:   Current Outpatient Prescriptions   Medication   • gabapentin (NEURONTIN) 300 MG Cap   • predniSONE (DELTASONE) 10 MG Tab   • busPIRone (BUSPAR) 5 MG tablet   • phenazopyridine (PYRIDIUM) 200 MG Tab   • ibuprofen (MOTRIN) 800 MG Tab   • Ivabradine HCl 7.5 MG Tab   • vitamin D, Ergocalciferol, (DRISDOL) 81853 units Cap capsule   • norethindrone (DEBLITANE) 0.35 MG tablet   • ziprasidone (GEODON) 80 MG Cap   • methocarbamol (ROBAXIN) 750 MG Tab   • Diclofenac Sodium (VOLTAREN) 1 % Gel   • sodium bicarbonate (SODIUM BICARBONATE) 650 MG Tab   • FLUoxetine (PROZAC) 10 MG Cap   • traZODone (DESYREL) 100 MG Tab   • Diphenhydramine-APAP, sleep, (TYLENOL PM EXTRA STRENGTH PO)   • LYRICA 300 MG capsule   • furosemide (LASIX) 40 MG Tab   • aspirin EC (ECOTRIN) 81 MG Tablet Delayed Response     No current facility-administered medications for this visit.        ALLERGIES:    Allergies   Allergen Reactions   • Cefdinir Shortness of Breath and Itching     Tolerated 1/18/17  Tolerates ceftriaxone    • Depakote [Divalproex Sodium] Unspecified     Muscle spasms/muscle pain and weakness     • Doxycycline Anaphylaxis and Vomiting     RXN=unknown   • Abilify Unspecified     Headaches/muscle twitching     • Amitriptyline Unspecified     Headaches     • Amoxicillin Rash     Pt states \"I get a rash\".     • Ciprofloxacin Rash     Pt states \"I get a rash\".     • Clindamycin Nausea     Even with food     • Ees [Erythromycin] " "Vomiting and Nausea   • Flagyl [Metronidazole Hcl] Unspecified     \"eye problems\"     • Flomax [Tamsulosin Hydrochloride] Swelling   • Metformin Unspecified     Increased lactic acid      • Tape Rash     Tears skin off  coban with Tegaderm tape ok intermittently  RXN=ongoing   • Vancomycin Itching     Pt becomes flushed in face and chest.   RXN=7/10/16   • Wound Dressing Adhesive Hives     By pt report   • Cephalexin [Keflex] Rash     Pt states she gets a rash with this medication  Tolerates ceftriaxone   • Levofloxacin Unspecified     Leg muscle cramps   • Metronidazole Rash     .   • Valproic Acid Rash     .         SOCIAL HISTORY:   Social History     Social History   • Marital status: Single     Spouse name: N/A   • Number of children: N/A   • Years of education: N/A     Social History Main Topics   • Smoking status: Never Smoker   • Smokeless tobacco: Never Used   • Alcohol use No   • Drug use: Yes     Frequency: 7.0 times per week     Types: Marijuana, Oral      Comment: Medicinal edible's   • Sexual activity: Not Currently     Birth control/ protection: Pill     Other Topics Concern   • Not on file     Social History Narrative    ** Merged History Encounter **            FAMILY HISTORY:   Family History   Problem Relation Age of Onset   • Hypertension Mother    • Sleep Apnea Mother    • Heart Disease Mother    • Other Mother         hypothryod   • Hypertension Maternal Uncle    • Heart Disease Maternal Grandmother    • Hypertension Maternal Grandmother    • No Known Problems Sister    • Other Sister         Narcolepsy;fibromyalsia;bone;nerve   • Genitourinary () Sister         endometriosis       Wound Assessment:               Wound 06/19/19 Partial thickness right breast superior lateral wound (Active)   Wound Image   6/19/2019  9:00 AM   Site Assessment Red 6/19/2019  9:00 AM   Lucretia-wound Assessment Fragile;Blanchable erythema 6/19/2019  9:00 AM   Margins Attached edges 6/19/2019  9:00 AM   Wound Length " (cm) 0.3 cm 6/19/2019  9:00 AM   Wound Width (cm) 0.7 cm 6/19/2019  9:00 AM   Wound Depth (cm) 0.1 cm 6/19/2019  9:00 AM   Wound Surface Area (cm^2) 0.21 cm^2 6/19/2019  9:00 AM   Post Wound Length (cm) 0.3 cm 6/19/2019  9:00 AM    Post Wound Width (cm) 0.7 cm 6/19/2019  9:00 AM   Post Wound Depth (cm) 0.1 cm 6/19/2019  9:00 AM   Post Wound Surface Area (cm^2) 0.21 cm^2 6/19/2019  9:00 AM   Tunneling 0 cm 6/19/2019  9:00 AM   Undermining 0 cm 6/19/2019  9:00 AM   Closure Secondary intention 6/19/2019  9:00 AM   Drainage Amount Moderate 6/19/2019  9:00 AM   Drainage Description Serosanguineous 6/19/2019  9:00 AM   Non-staged Wound Description Partial thickness 6/19/2019  9:00 AM   Treatments Cleansed;Site care 6/19/2019  9:00 AM   Cleansing Normal Saline Irrigation 6/19/2019  9:00 AM   Periwound Protectant Skin Protectant wipes to Periwound 6/19/2019  9:00 AM   Dressing Options Collagen Dressing;Hydrocolloid Thin 6/19/2019  9:00 AM   Dressing Cleansing/Solutions Not Applicable 6/19/2019  9:00 AM   Dressing Changed New 6/19/2019  9:00 AM   Dressing Status Clean;Dry;Intact 6/19/2019  9:00 AM   Dressing Change Frequency Every 72 hrs 6/19/2019  9:00 AM   WOUND NURSE ONLY - Odor None 6/19/2019  9:00 AM   WOUND NURSE ONLY - Exposed Structures None 6/19/2019  9:00 AM   WOUND NURSE ONLY - Tissue Type and Percentage 100% moist red 6/19/2019  9:00 AM       Wound 06/19/19 partial thickness right breast inferior medial wound (Active)   Wound Image   6/19/2019  9:00 AM   Site Assessment Red 6/19/2019  9:00 AM   Lucretia-wound Assessment Blanchable erythema;Fragile 6/19/2019  9:00 AM   Margins Attached edges 6/19/2019  9:00 AM   Wound Length (cm) 0.4 cm 6/19/2019  9:00 AM   Wound Width (cm) 1 cm 6/19/2019  9:00 AM   Wound Depth (cm) 0.1 cm 6/19/2019  9:00 AM   Wound Surface Area (cm^2) 0.4 cm^2 6/19/2019  9:00 AM   Post Wound Length (cm) 0.4 cm 6/19/2019  9:00 AM    Post Wound Width (cm) 1 cm 6/19/2019  9:00 AM   Post Wound Depth (cm)  0.1 cm 6/19/2019  9:00 AM   Post Wound Surface Area (cm^2) 0.4 cm^2 6/19/2019  9:00 AM   Tunneling 0 cm 6/19/2019  9:00 AM   Undermining 0 cm 6/19/2019  9:00 AM   Closure Secondary intention 6/19/2019  9:00 AM   Drainage Amount Moderate 6/19/2019  9:00 AM   Drainage Description Serosanguineous 6/19/2019  9:00 AM   Non-staged Wound Description Partial thickness 6/19/2019  9:00 AM   Treatments Cleansed;Site care 6/19/2019  9:00 AM   Cleansing Normal Saline Irrigation 6/19/2019  9:00 AM   Periwound Protectant Skin Protectant wipes to Periwound 6/19/2019  9:00 AM   Dressing Options Collagen Dressing;Hydrocolloid Thin 6/19/2019  9:00 AM   Dressing Cleansing/Solutions Not Applicable 6/19/2019  9:00 AM   Dressing Changed New 6/19/2019  9:00 AM   Dressing Status Clean;Dry;Intact 6/19/2019  9:00 AM   Dressing Change Frequency Every 72 hrs 6/19/2019  9:00 AM   WOUND NURSE ONLY - Odor None 6/19/2019  9:00 AM   WOUND NURSE ONLY - Exposed Structures None 6/19/2019  9:00 AM   WOUND NURSE ONLY - Tissue Type and Percentage 100% moist red 6/19/2019  9:00 AM       Wound 06/19/19 partial thickness left breast inferior lateral wound (Active)   Wound Image   6/19/2019  9:00 AM   Site Assessment Red;Yellow 6/19/2019  9:00 AM   Lucretia-wound Assessment Fragile;Blanchable erythema 6/19/2019  9:00 AM   Margins Attached edges 6/19/2019  9:00 AM   Wound Length (cm) 2.2 cm 6/19/2019  9:00 AM   Wound Width (cm) 4.2 cm 6/19/2019  9:00 AM   Wound Depth (cm) 0.1 cm 6/19/2019  9:00 AM   Wound Surface Area (cm^2) 9.24 cm^2 6/19/2019  9:00 AM   Post Wound Length (cm) 2.2 cm 6/19/2019  9:00 AM    Post Wound Width (cm) 4.2 cm 6/19/2019  9:00 AM   Post Wound Depth (cm) 0.1 cm 6/19/2019  9:00 AM   Post Wound Surface Area (cm^2) 9.24 cm^2 6/19/2019  9:00 AM   Tunneling 0 cm 6/19/2019  9:00 AM   Undermining 0 cm 6/19/2019  9:00 AM   Closure Secondary intention 6/19/2019  9:00 AM   Drainage Amount Moderate 6/19/2019  9:00 AM   Drainage Description  Serosanguineous 6/19/2019  9:00 AM   Non-staged Wound Description Partial thickness 6/19/2019  9:00 AM   Treatments Cleansed;Site care 6/19/2019  9:00 AM   Cleansing Normal Saline Irrigation 6/19/2019  9:00 AM   Periwound Protectant Skin Protectant wipes to Periwound 6/19/2019  9:00 AM   Dressing Options Hydrocolloid Thin;Collagen Dressing 6/19/2019  9:00 AM   Dressing Cleansing/Solutions Not Applicable 6/19/2019  9:00 AM   Dressing Changed New 6/19/2019  9:00 AM   Dressing Status Clean;Dry;Intact 6/19/2019  9:00 AM   Dressing Change Frequency Every 72 hrs 6/19/2019  9:00 AM   WOUND NURSE ONLY - Odor None 6/19/2019  9:00 AM   WOUND NURSE ONLY - Exposed Structures None 6/19/2019  9:00 AM   WOUND NURSE ONLY - Tissue Type and Percentage 50% yellow, 50% red 6/19/2019  9:00 AM       Wound 06/19/19 partial thickness left breast superior middle wound (Active)   Wound Image   6/19/2019  9:00 AM   Site Assessment Red 6/19/2019  9:00 AM   Lucretia-wound Assessment Fragile;Scar tissue 6/19/2019  9:00 AM   Margins Attached edges 6/19/2019  9:00 AM   Wound Length (cm) 0.8 cm 6/19/2019  9:00 AM   Wound Width (cm) 1.4 cm 6/19/2019  9:00 AM   Wound Depth (cm) 0.1 cm 6/19/2019  9:00 AM   Wound Surface Area (cm^2) 1.12 cm^2 6/19/2019  9:00 AM   Post Wound Length (cm) 0.8 cm 6/19/2019  9:00 AM    Post Wound Width (cm) 1.4 cm 6/19/2019  9:00 AM   Post Wound Depth (cm) 0.1 cm 6/19/2019  9:00 AM   Post Wound Surface Area (cm^2) 1.12 cm^2 6/19/2019  9:00 AM   Tunneling 0 cm 6/19/2019  9:00 AM   Undermining 0 cm 6/19/2019  9:00 AM   Closure Secondary intention 6/19/2019  9:00 AM   Drainage Amount Moderate 6/19/2019  9:00 AM   Drainage Description Serosanguineous 6/19/2019  9:00 AM   Non-staged Wound Description Partial thickness 6/19/2019  9:00 AM   Treatments Site care;Cleansed 6/19/2019  9:00 AM   Cleansing Normal Saline Irrigation 6/19/2019  9:00 AM   Periwound Protectant Skin Protectant wipes to Periwound 6/19/2019  9:00 AM   Dressing  Options Collagen Dressing;Hydrocolloid Thin 6/19/2019  9:00 AM   Dressing Cleansing/Solutions Not Applicable 6/19/2019  9:00 AM   Dressing Changed New 6/19/2019  9:00 AM   Dressing Status Clean;Dry;Intact 6/19/2019  9:00 AM   Dressing Change Frequency Every 72 hrs 6/19/2019  9:00 AM   WOUND NURSE ONLY - Odor None 6/19/2019  9:00 AM   WOUND NURSE ONLY - Exposed Structures None 6/19/2019  9:00 AM   WOUND NURSE ONLY - Tissue Type and Percentage 100% moist red 6/19/2019  9:00 AM       Wound 06/19/19 partial thickness left breast medial superior wound (Active)   Site Assessment Red 6/19/2019  9:00 AM   Lucretia-wound Assessment Fragile 6/19/2019  9:00 AM   Margins Attached edges 6/19/2019  9:00 AM   Wound Length (cm) 0.6 cm 6/19/2019  9:00 AM   Wound Width (cm) 1.4 cm 6/19/2019  9:00 AM   Wound Depth (cm) 0.1 cm 6/19/2019  9:00 AM   Wound Surface Area (cm^2) 0.84 cm^2 6/19/2019  9:00 AM   Post Wound Length (cm) 0.6 cm 6/19/2019  9:00 AM    Post Wound Width (cm) 1.4 cm 6/19/2019  9:00 AM   Post Wound Depth (cm) 0.1 cm 6/19/2019  9:00 AM   Post Wound Surface Area (cm^2) 0.84 cm^2 6/19/2019  9:00 AM   Tunneling 0 cm 6/19/2019  9:00 AM   Undermining 0 cm 6/19/2019  9:00 AM   Closure Secondary intention 6/19/2019  9:00 AM   Drainage Amount Moderate 6/19/2019  9:00 AM   Drainage Description Serosanguineous 6/19/2019  9:00 AM   Non-staged Wound Description Partial thickness 6/19/2019  9:00 AM   Treatments Cleansed;Site care 6/19/2019  9:00 AM   Cleansing Normal Saline Irrigation 6/19/2019  9:00 AM   Periwound Protectant Skin Protectant wipes to Periwound 6/19/2019  9:00 AM   Dressing Options Collagen Dressing;Hydrocolloid Thin 6/19/2019  9:00 AM   Dressing Cleansing/Solutions Not Applicable 6/19/2019  9:00 AM   Dressing Changed New 6/19/2019  9:00 AM   Dressing Status Clean;Dry;Intact 6/19/2019  9:00 AM   Dressing Change Frequency Every 72 hrs 6/19/2019  9:00 AM   WOUND NURSE ONLY - Odor None 6/19/2019  9:00 AM   WOUND NURSE ONLY -  Exposed Structures None 6/19/2019  9:00 AM   WOUND NURSE ONLY - Tissue Type and Percentage 100% moist red 6/19/2019  9:00 AM     Ordered supplies from [facility].    PATIENT EDUCATION  -Advised to go to ER for any increased redness, swelling, drainage or odor, or if patient develops fever, chills, nausea or vomiting.  -Importance of adequate nutrition for wound healing  -Increase protein intake (unless contraindicated by renal status)  -Change dressing every 72 hours or for saturation or dislodgement.  -Return in 1 week.

## 2019-06-19 NOTE — PATIENT INSTRUCTIONS
Should you experience any significant changes in your wound(s) such as infection (redness, swelling, localized heat, increased pain, fever >101 F, chills) or have any questions regarding your home care instructions, please contact the wound center [phone]. If after hours, contact your primary care physician or go the hospital emergency room.  Keep dressing clean and dry and cover while bathing. Only change dressing if over saturated, soiled or its falling off or every 72 hours as demonstrated during your appointment. Your supplies will come from a company call [employer].

## 2019-06-19 NOTE — PROCEDURES
After application of topical lidocaine, CSWD with scalpel to remove dried crust, lint and slough layer from wound beds to bilateral breasts of ~2cm2.

## 2019-06-20 ENCOUNTER — HOSPITAL ENCOUNTER (OUTPATIENT)
Facility: MEDICAL CENTER | Age: 29
End: 2019-06-20
Attending: INTERNAL MEDICINE
Payer: MEDICARE

## 2019-06-20 ENCOUNTER — OFFICE VISIT (OUTPATIENT)
Dept: MEDICAL GROUP | Facility: MEDICAL CENTER | Age: 29
End: 2019-06-20
Payer: MEDICARE

## 2019-06-20 ENCOUNTER — TELEPHONE (OUTPATIENT)
Dept: WOUND CARE | Facility: MEDICAL CENTER | Age: 29
End: 2019-06-20

## 2019-06-20 VITALS
DIASTOLIC BLOOD PRESSURE: 84 MMHG | SYSTOLIC BLOOD PRESSURE: 120 MMHG | OXYGEN SATURATION: 95 % | TEMPERATURE: 97.9 F | HEART RATE: 94 BPM

## 2019-06-20 DIAGNOSIS — S21.002D WOUND OF LEFT BREAST, SUBSEQUENT ENCOUNTER: ICD-10-CM

## 2019-06-20 PROCEDURE — 99214 OFFICE O/P EST MOD 30 MIN: CPT | Performed by: INTERNAL MEDICINE

## 2019-06-20 PROCEDURE — 87205 SMEAR GRAM STAIN: CPT

## 2019-06-20 PROCEDURE — 87070 CULTURE OTHR SPECIMN AEROBIC: CPT

## 2019-06-20 RX ORDER — SULFAMETHOXAZOLE AND TRIMETHOPRIM 800; 160 MG/1; MG/1
1 TABLET ORAL 2 TIMES DAILY
Qty: 10 TAB | Refills: 0 | Status: SHIPPED | OUTPATIENT
Start: 2019-06-20 | End: 2019-06-25

## 2019-06-20 NOTE — PROGRESS NOTES
CC: Asthma, breast wound.                                                                                                                                      HPI:   Kristin presents today with the following.    1. Moderate intermittent asthma without complication  Presents reporting this morning she woke up having difficulty breathing and was wheezing.  She is on currently 40 mg of steroids for optic neuritis.  She has been titrating down slowly.  She denies any fevers or chills or productive cough learned today that she has a cat whose litter box is in her room and the cat is there 24/7 as well.    2. Wound of left breast, subsequent encounter  She does have a persistent wound on the left breast going to wound care reporting increased drainage and redness.  She has no fevers or chills redness is not spread out from around the small portion of the wound.      Patient Active Problem List    Diagnosis Date Noted   • Left leg weakness 07/14/2018     Priority: High   • Controlled type 2 diabetes mellitus without complication, without long-term current use of insulin ([facility]) 04/26/2017     Priority: High   • Sinus tachycardia 10/31/2013     Priority: High   • Chronic inflammatory arthritis 05/23/2016     Priority: Medium   • Morbid obesity with BMI of 45.0-49.9, adult ([facility]) 10/24/2017     Priority: Low   • Depression 10/28/2016     Priority: Low   • Schizophrenia ([facility]) 10/27/2016     Priority: Low   • PCOS (polycystic ovarian syndrome) 11/23/2015     Priority: Low   • Progressive focal motor weakness 06/28/2015     Priority: Low   • Fatty liver disease, nonalcoholic 01/19/2015     Priority: Low   • Anxiety 12/16/2014     Priority: Low   • Knee pain, right 02/13/2014     Priority: Low   • Neurogenic bladder 04/02/2011     Priority: Low   • Recurrent UTI 09/18/2010     Priority: Low   • Borderline personality disorder in adult ([facility]) 09/18/2010     Priority: Low   • Moderate intermittent asthma without complication  06/20/2019   • Constipation 05/29/2019   • Weakness 05/29/2019   • Optic neuritis 05/27/2019   • Inappropriate sinus tachycardia 04/10/2019   • Asymptomatic bacteriuria 10/26/2018   • Palpitations 10/01/2018   • Chronic respiratory failure with hypoxia, on home oxygen therapy ([facility]) 08/08/2018   • Transaminitis 08/08/2018   • TONYA (obstructive sleep apnea) 01/09/2018   • Functional diarrhea 01/05/2018   • Breast wound 11/06/2017   • Intractable episodic cluster headache 09/14/2017   • Rash 06/09/2017   • Hashimoto's encephalopathy 05/17/2017   • Fall at home 05/13/2017   • On home oxygen therapy 04/15/2017   • Weakness of right upper extremity 02/23/2017   • Chronic suprapubic catheter ([facility]) 02/16/2017   • Hypovitaminosis D 11/29/2016   • Chronic pain syndrome 10/27/2016   • Bowel and bladder incontinence 10/27/2016   • Galactorrhea 07/22/2016   • Elevated sedimentation rate 06/27/2016   • Weakness of both lower extremities 06/22/2016   • Vitamin D deficiency 05/21/2016   • Morbidly obese ([facility]) 03/07/2016   • Scoliosis 03/07/2016   • GERD (gastroesophageal reflux disease) 03/07/2016   • Peripheral neuropathy ([facility]) 03/06/2016   • H/O prior ablation treatment 02/10/2016       Current Outpatient Prescriptions   Medication Sig Dispense Refill   • sulfamethoxazole-trimethoprim (BACTRIM DS) 800-160 MG tablet Take 1 Tab by mouth 2 times a day for 5 days. 10 Tab 0   • gabapentin (NEURONTIN) 300 MG Cap Take 300 mg by mouth 4 times a day.     • predniSONE (DELTASONE) 10 MG Tab Take 40 mg by mouth every day.     • busPIRone (BUSPAR) 5 MG tablet TAKE ONE TABLET BY MOUTH TWICE DAILY 60 Tab 4   • phenazopyridine (PYRIDIUM) 200 MG Tab Take 1 Tab by mouth 3 times a day as needed. 30 Tab 2   • ibuprofen (MOTRIN) 800 MG Tab Take 1 Tab by mouth every 8 hours as needed for Moderate Pain. 30 Tab 0   • Ivabradine HCl 7.5 MG Tab Take 7.5 mg by mouth 2 Times a Day.     • vitamin D, Ergocalciferol, (DRISDOL) 73026 units Cap capsule Take  50,000 Units by mouth every Friday.     • norethindrone (DEBLITANE) 0.35 MG tablet Take 0.35 mg by mouth every day.     • ziprasidone (GEODON) 80 MG Cap Take 80 mg by mouth 2 Times a Day.     • methocarbamol (ROBAXIN) 750 MG Tab Take 750 mg by mouth 3 times a day.     • Diclofenac Sodium (VOLTAREN) 1 % Gel Apply 1 Inch to skin as directed 4 times a day. 5 Tube 6   • sodium bicarbonate (SODIUM BICARBONATE) 650 MG Tab Take 1 Tab by mouth 2 times a day. 360 Tab 3   • FLUoxetine (PROZAC) 10 MG Cap TAKE ONE CAPSULE BY MOUTH ONCE A DAY 30 Cap 2   • traZODone (DESYREL) 100 MG Tab TAKE  ONE TABLET BY MOUTH NIGHTLY AT BEDTIME 90 Tab 2   • Diphenhydramine-APAP, sleep, (TYLENOL PM EXTRA STRENGTH PO) Take 1 Cap by mouth every day.     • LYRICA 300 MG capsule Take 300 mg by mouth 2 times a day.     • furosemide (LASIX) 40 MG Tab Take 80 mg by mouth every day.     • aspirin EC (ECOTRIN) 81 MG Tablet Delayed Response Take 1 Tab by mouth every day. 30 Tab 6     No current facility-administered medications for this visit.          Allergies as of 06/20/2019 - Reviewed 06/20/2019   Allergen Reaction Noted   • Cefdinir Shortness of Breath and Itching 03/01/2016   • Depakote [divalproex sodium] Unspecified 06/14/2010   • Doxycycline Anaphylaxis and Vomiting 08/15/2012   • Abilify Unspecified 01/17/2013   • Amitriptyline Unspecified 10/31/2013   • Amoxicillin Rash 09/18/2010   • Ciprofloxacin Rash 12/17/2009   • Clindamycin Nausea 02/02/2011   • Ees [erythromycin] Vomiting and Nausea 08/28/2010   • Flagyl [metronidazole hcl] Unspecified 03/31/2011   • Flomax [tamsulosin hydrochloride] Swelling 09/24/2009   • Metformin Unspecified 07/23/2013   • Tape Rash 08/15/2012   • Vancomycin Itching 07/10/2016   • Wound dressing adhesive Hives 01/12/2018   • Cephalexin [keflex] Rash 01/01/2017   • Levofloxacin Unspecified 10/27/2016   • Metronidazole Rash 03/30/2017   • Valproic acid Rash 03/30/2017        ROS: Denies Chest pain, SOB, LE  edema.    /84 (BP Location: Left arm, Patient Position: Sitting)   Pulse 94   Temp 36.6 °C (97.9 °F)   LMP 05/27/2019   SpO2 95%     Physical Exam:  Gen:         Alert and oriented, No apparent distress.  Neck:        No Lymphadenopathy or Bruits.  Lungs:     Clear to auscultation bilaterally  CV:          Regular rate and rhythm. No murmurs, rubs or gallops.               Ext:          No clubbing, cyanosis, edema.      Assessment and Plan.   28 y.o. female with the following issues.    1. Moderate intermittent asthma without complication  Clear to auscultation currently no sign of infection and steroid for the next 3 days to twice daily back to baseline dose.    2. Wound of left breast, subsequent encounter  Overwhelming for infection starting empirically on Bactrim for 5 days will await results of culture.  - CULTURE WOUND W/ GRAM STAIN; Future

## 2019-06-20 NOTE — TELEPHONE ENCOUNTER
"Patient left a message on the clinic phone that she was having lots of green-yellow drainage from her wound and that she was out of supplies.  I returned her phone call.  Patient reiterated that she was having lots of green-yellow drainage, and that there was redness around her wounds.  She does not know she is been having fevers but states she is, \"chilly\".  I advised her to go to urgent care or the ED.  She states she already had a appointment at urgent care for later today.  "

## 2019-06-21 ENCOUNTER — PHYSICAL THERAPY (OUTPATIENT)
Dept: PHYSICAL THERAPY | Facility: REHABILITATION | Age: 29
End: 2019-06-21
Attending: INTERNAL MEDICINE
Payer: MEDICARE

## 2019-06-21 DIAGNOSIS — M72.2 PLANTAR FASCIITIS: ICD-10-CM

## 2019-06-21 DIAGNOSIS — M25.579 ACUTE ANKLE PAIN, UNSPECIFIED LATERALITY: ICD-10-CM

## 2019-06-21 LAB
GRAM STN SPEC: NORMAL
SIGNIFICANT IND 70042: NORMAL
SITE SITE: NORMAL
SOURCE SOURCE: NORMAL

## 2019-06-21 PROCEDURE — 97110 THERAPEUTIC EXERCISES: CPT

## 2019-06-21 NOTE — OP THERAPY DAILY TREATMENT
Outpatient Physical Therapy  DAILY TREATMENT     [facility] Physical [address]  [address]  [place] [address]  Phone:  [phone]  Fax:  [phone]    Date: 06/21/2019    Patient: Kristin Balderrama  YOB: 1990  MRN: [id number]     Time Calculation  Start time: 1035  Stop time: 1110 Time Calculation (min): 35 minutes     Chief Complaint: Ankle Pain   Visit #: 2    SUBJECTIVE:  Patient present in manual wheelchair too small for patient, but she indicates it is the only wheelchair that allows her mobility inside her home. She reports her insurance has not provided her a wheelchair and she has another from [facility].     She continues to wear a CAM boot on the R ankle, reports she does not know how long to wear or when a follow up is with [facility]. Requested her to complete forms for for authorization for our clinic to receive [facility] notes further clarification, with possible MD follow up.     OBJECTIVE:    R DF AROM: neutral       Therapeutic Exercises (CPT 08026):     1. Transfers W/C to mat , cueing and patient demo of reach pivot vs full turn for safety. , Patient does not incorporate use of R LE with transfer    2. Ankle circles , x 10 each clockwise/counterclockwise     3. Ankle DF/ PF x 10     4. PROM R toe extension/DF/PF progressing to AAROM , x 20     5. Sitting with R foot on airex , multiplan movement as tolerated increasing movement with supported WB     Therapeutic Treatments and Modalities:     1. Neuromuscular Re-education (CPT 03961), IASTM L ankle and calf, with sensory de sensitization     Therapeutic Treatment and Modalities Summary: MHP 10 minutes with ankle, able to tolerate     Time-based treatments/modalities:  Therapeutic exercise minutes (CPT 34884): 25 minutes  Neuromusc re-ed, balance, coor, post minutes (CPT 93444): 5 minutes       Pain rating before treatment: 6      ASSESSMENT:   Response to treatment: Patient very guarded with ankle movement but  willing to participate with PT. She does demonstrate improvement with R ankle ROM. Patient may need additional potential wheelchair evaluation for improved fit and ability to safely maneuver from chair and in home.     Check with patient for forms signature to receive records from [facility].     PLAN/RECOMMENDATIONS:   Plan for treatment: therapy treatment to continue next visit.  Planned interventions for next visit: continue with current treatment.

## 2019-06-26 ENCOUNTER — NON-PROVIDER VISIT (OUTPATIENT)
Dept: WOUND CARE | Facility: MEDICAL CENTER | Age: 29
End: 2019-06-26
Attending: INTERNAL MEDICINE
Payer: MEDICARE

## 2019-06-26 ENCOUNTER — PHYSICAL THERAPY (OUTPATIENT)
Dept: PHYSICAL THERAPY | Facility: REHABILITATION | Age: 29
End: 2019-06-26
Attending: INTERNAL MEDICINE
Payer: MEDICARE

## 2019-06-26 DIAGNOSIS — M25.579 ACUTE ANKLE PAIN, UNSPECIFIED LATERALITY: ICD-10-CM

## 2019-06-26 DIAGNOSIS — M25.511 CHRONIC RIGHT SHOULDER PAIN: ICD-10-CM

## 2019-06-26 DIAGNOSIS — G89.29 CHRONIC RIGHT SHOULDER PAIN: ICD-10-CM

## 2019-06-26 DIAGNOSIS — M72.2 PLANTAR FASCIITIS: ICD-10-CM

## 2019-06-26 PROCEDURE — 97110 THERAPEUTIC EXERCISES: CPT

## 2019-06-26 PROCEDURE — 97602 WOUND(S) CARE NON-SELECTIVE: CPT

## 2019-06-26 NOTE — PROCEDURES
2% viscous lidocaine to left breast open wounds only due to pain. Non selective with NS and gauze to remove non viable tissue from wound bed.

## 2019-06-26 NOTE — OP THERAPY DAILY TREATMENT
Outpatient Physical Therapy  DAILY TREATMENT     [facility] Physical Therapy [address]  [address]  [place] [address]  Phone:  [phone]  Fax:  [phone]    Date: 06/26/2019    Patient: Kristin Balderrama  YOB: 1990  MRN: [id number]     Time Calculation  Start time: 1330  Stop time: 1400 Time Calculation (min): 30 minutes     Chief Complaint: debility/ankle injury   Visit #: 3    SUBJECTIVE:  Patient presents walking into clinic with 4WW and wearing boot.     OBJECTIVE:          Therapeutic Exercises (CPT 76471):     1. Nustep , 5 minutes     2. Supine ankle DF/ PF     3. Seated ankle circles with dynadisc     4. Sit to stand transfers safety with 4WW , supervision     5. Seated knee flexion/extension with [employer] skateboard , 2 x 10       Time-based treatments/modalities:  Therapeutic exercise minutes (CPT 47859): 30 minutes     ASSESSMENT:   Response to treatment: Some of treatment spent today assisting patient and requesting records from [facility]. Patient reports decreased endurance and unable to walk from clinic to car after treatment, using clinic WC. Patient is able to initiate more ankle exercises. Recommend with confirmed WB status, practicing safe functional mobility with transfers. Discussed with patient contacting [facility] for information on assistance for home modifications for accessibility.     PLAN/RECOMMENDATIONS:   Plan for treatment: therapy treatment to continue next visit. Progressing standing/ transfers/ gait mobility as tolerated, ankle strengthening.   Planned interventions for next visit: continue with current treatment.    Called [facility] to obtain medical records regarding ankle injury and boot. Patient signed records release form and [facility] info scanned into media tab. Patient is WBAT in boot with instructed on planned follow up with [facility] 4 weeks from 06/12 visit.

## 2019-06-27 ENCOUNTER — HOSPITAL ENCOUNTER (OUTPATIENT)
Dept: LAB | Facility: MEDICAL CENTER | Age: 29
DRG: 123 | End: 2019-06-27
Attending: NURSE PRACTITIONER
Payer: MEDICARE

## 2019-06-27 PROCEDURE — 87086 URINE CULTURE/COLONY COUNT: CPT

## 2019-06-28 ENCOUNTER — PHYSICAL THERAPY (OUTPATIENT)
Dept: PHYSICAL THERAPY | Facility: REHABILITATION | Age: 29
End: 2019-06-28
Attending: INTERNAL MEDICINE
Payer: MEDICARE

## 2019-06-28 DIAGNOSIS — M72.2 PLANTAR FASCIITIS: ICD-10-CM

## 2019-06-28 DIAGNOSIS — M25.511 CHRONIC RIGHT SHOULDER PAIN: ICD-10-CM

## 2019-06-28 DIAGNOSIS — G89.29 CHRONIC RIGHT SHOULDER PAIN: ICD-10-CM

## 2019-06-28 DIAGNOSIS — M25.579 ACUTE ANKLE PAIN, UNSPECIFIED LATERALITY: ICD-10-CM

## 2019-06-28 LAB
AMBIGUOUS DTTM AMBI4: NORMAL
SIGNIFICANT IND 70042: NORMAL
SITE SITE: NORMAL
SOURCE SOURCE: NORMAL

## 2019-06-28 PROCEDURE — 97110 THERAPEUTIC EXERCISES: CPT

## 2019-06-28 NOTE — OP THERAPY DAILY TREATMENT
Outpatient Physical Therapy  DAILY TREATMENT     [facility] Physical [address]  [address]  [place] [address]  Phone:  [phone]  Fax:  [phone]    Date: 06/28/2019    Patient: Kristin Balderrama  YOB: 1990  MRN: [id number]     Time Calculation  Start time: 0930  Stop time: 0950 Time Calculation (min): 20 minutes     Chief Complaint: No chief complaint on file.    Visit #: 4    SUBJECTIVE:  Patient reports she is not feeling well today, but willing to try PT     OBJECTIVE:  Current objective measures:  Sa02: 96% on RA   HR: 79/80   BP: 110/ 80          Therapeutic Exercises (CPT 29803):     1. Nustep,  6 minutes, level 3     2. Sit to stand x 4 with 4WW , cueing to slow movement with turns     3. Gait 2 x 15 ft with 4WW Edna , Cueing to slow movement       Therapeutic Exercise Summary: Discussed patient WBAT from [facility] notes.   Patient requested to stop treatment after 20 minutes       Time-based treatments/modalities:  Therapeutic exercise minutes (CPT 29269): 20 minutes       ASSESSMENT:   Response to treatment: Patient unable to complete session today due to not feeling well. We were able to progress gait and transfers with 4WW and now confirmed patient is WBAT.     PLAN/RECOMMENDATIONS:   Plan for treatment: therapy treatment to continue next visit.  Planned interventions for next visit: continue with current treatment.

## 2019-06-29 ENCOUNTER — APPOINTMENT (OUTPATIENT)
Dept: RADIOLOGY | Facility: MEDICAL CENTER | Age: 29
DRG: 123 | End: 2019-06-29
Attending: EMERGENCY MEDICINE
Payer: MEDICARE

## 2019-06-29 ENCOUNTER — HOSPITAL ENCOUNTER (INPATIENT)
Facility: MEDICAL CENTER | Age: 29
LOS: 4 days | DRG: 123 | End: 2019-07-03
Attending: EMERGENCY MEDICINE | Admitting: HOSPITALIST
Payer: MEDICARE

## 2019-06-29 ENCOUNTER — APPOINTMENT (OUTPATIENT)
Dept: RADIOLOGY | Facility: IMAGING CENTER | Age: 29
End: 2019-06-29
Attending: NURSE PRACTITIONER
Payer: MEDICARE

## 2019-06-29 ENCOUNTER — OFFICE VISIT (OUTPATIENT)
Dept: URGENT CARE | Facility: CLINIC | Age: 29
End: 2019-06-29
Payer: MEDICARE

## 2019-06-29 VITALS
DIASTOLIC BLOOD PRESSURE: 72 MMHG | WEIGHT: 282 LBS | HEIGHT: 65 IN | OXYGEN SATURATION: 98 % | TEMPERATURE: 99.1 F | SYSTOLIC BLOOD PRESSURE: 110 MMHG | BODY MASS INDEX: 46.98 KG/M2 | HEART RATE: 107 BPM

## 2019-06-29 DIAGNOSIS — R06.02 SOB (SHORTNESS OF BREATH): ICD-10-CM

## 2019-06-29 DIAGNOSIS — H46.9 OPTIC NEURITIS: ICD-10-CM

## 2019-06-29 DIAGNOSIS — J45.41 MODERATE PERSISTENT ASTHMA WITH EXACERBATION: ICD-10-CM

## 2019-06-29 DIAGNOSIS — S21.009A BREAST WOUND, UNSPECIFIED LATERALITY, INITIAL ENCOUNTER: ICD-10-CM

## 2019-06-29 DIAGNOSIS — H57.12 PAIN OF LEFT EYE: ICD-10-CM

## 2019-06-29 PROBLEM — F25.9 SCHIZOAFFECTIVE DISORDER (HCC): Status: ACTIVE | Noted: 2019-06-29

## 2019-06-29 LAB
ANION GAP SERPL CALC-SCNC: 14 MMOL/L (ref 0–11.9)
BASOPHILS # BLD AUTO: 0.3 % (ref 0–1.8)
BASOPHILS # BLD: 0.03 K/UL (ref 0–0.12)
BUN SERPL-MCNC: 20 MG/DL (ref 8–22)
CALCIUM SERPL-MCNC: 9.4 MG/DL (ref 8.5–10.5)
CHLORIDE SERPL-SCNC: 104 MMOL/L (ref 96–112)
CO2 SERPL-SCNC: 22 MMOL/L (ref 20–33)
CREAT SERPL-MCNC: 0.81 MG/DL (ref 0.5–1.4)
CRP SERPL HS-MCNC: 1.3 MG/L (ref 0–7.5)
EOSINOPHIL # BLD AUTO: 0.02 K/UL (ref 0–0.51)
EOSINOPHIL NFR BLD: 0.2 % (ref 0–6.9)
ERYTHROCYTE [DISTWIDTH] IN BLOOD BY AUTOMATED COUNT: 49.5 FL (ref 35.9–50)
ERYTHROCYTE [SEDIMENTATION RATE] IN BLOOD BY WESTERGREN METHOD: 2 MM/HOUR (ref 0–20)
GLUCOSE BLD-MCNC: 126 MG/DL (ref 65–99)
GLUCOSE BLD-MCNC: 173 MG/DL (ref 65–99)
GLUCOSE SERPL-MCNC: 177 MG/DL (ref 65–99)
HCT VFR BLD AUTO: 43 % (ref 37–47)
HGB BLD-MCNC: 14.1 G/DL (ref 12–16)
IMM GRANULOCYTES # BLD AUTO: 0.13 K/UL (ref 0–0.11)
IMM GRANULOCYTES NFR BLD AUTO: 1.2 % (ref 0–0.9)
LYMPHOCYTES # BLD AUTO: 1.17 K/UL (ref 1–4.8)
LYMPHOCYTES NFR BLD: 10.5 % (ref 22–41)
MCH RBC QN AUTO: 31.3 PG (ref 27–33)
MCHC RBC AUTO-ENTMCNC: 32.8 G/DL (ref 33.6–35)
MCV RBC AUTO: 95.3 FL (ref 81.4–97.8)
MONOCYTES # BLD AUTO: 0.54 K/UL (ref 0–0.85)
MONOCYTES NFR BLD AUTO: 4.8 % (ref 0–13.4)
NEUTROPHILS # BLD AUTO: 9.28 K/UL (ref 2–7.15)
NEUTROPHILS NFR BLD: 83 % (ref 44–72)
NRBC # BLD AUTO: 0 K/UL
NRBC BLD-RTO: 0 /100 WBC
PLATELET # BLD AUTO: 152 K/UL (ref 164–446)
PMV BLD AUTO: 10.4 FL (ref 9–12.9)
POTASSIUM SERPL-SCNC: 4.2 MMOL/L (ref 3.6–5.5)
RBC # BLD AUTO: 4.51 M/UL (ref 4.2–5.4)
SODIUM SERPL-SCNC: 140 MMOL/L (ref 135–145)
WBC # BLD AUTO: 11.2 K/UL (ref 4.8–10.8)

## 2019-06-29 PROCEDURE — 96375 TX/PRO/DX INJ NEW DRUG ADDON: CPT

## 2019-06-29 PROCEDURE — 71046 X-RAY EXAM CHEST 2 VIEWS: CPT | Mod: TC | Performed by: NURSE PRACTITIONER

## 2019-06-29 PROCEDURE — 80048 BASIC METABOLIC PNL TOTAL CA: CPT

## 2019-06-29 PROCEDURE — 85652 RBC SED RATE AUTOMATED: CPT

## 2019-06-29 PROCEDURE — 700117 HCHG RX CONTRAST REV CODE 255: Performed by: EMERGENCY MEDICINE

## 2019-06-29 PROCEDURE — 96365 THER/PROPH/DIAG IV INF INIT: CPT

## 2019-06-29 PROCEDURE — 700111 HCHG RX REV CODE 636 W/ 250 OVERRIDE (IP): Performed by: EMERGENCY MEDICINE

## 2019-06-29 PROCEDURE — 700111 HCHG RX REV CODE 636 W/ 250 OVERRIDE (IP): Performed by: HOSPITALIST

## 2019-06-29 PROCEDURE — 36415 COLL VENOUS BLD VENIPUNCTURE: CPT

## 2019-06-29 PROCEDURE — 94640 AIRWAY INHALATION TREATMENT: CPT | Performed by: NURSE PRACTITIONER

## 2019-06-29 PROCEDURE — 85025 COMPLETE CBC W/AUTO DIFF WBC: CPT

## 2019-06-29 PROCEDURE — 82962 GLUCOSE BLOOD TEST: CPT | Mod: 91

## 2019-06-29 PROCEDURE — A9270 NON-COVERED ITEM OR SERVICE: HCPCS | Performed by: HOSPITALIST

## 2019-06-29 PROCEDURE — 70481 CT ORBIT/EAR/FOSSA W/DYE: CPT

## 2019-06-29 PROCEDURE — 700102 HCHG RX REV CODE 250 W/ 637 OVERRIDE(OP): Performed by: EMERGENCY MEDICINE

## 2019-06-29 PROCEDURE — 770006 HCHG ROOM/CARE - MED/SURG/GYN SEMI*

## 2019-06-29 PROCEDURE — A9270 NON-COVERED ITEM OR SERVICE: HCPCS | Performed by: EMERGENCY MEDICINE

## 2019-06-29 PROCEDURE — 86141 C-REACTIVE PROTEIN HS: CPT

## 2019-06-29 PROCEDURE — 99285 EMERGENCY DEPT VISIT HI MDM: CPT

## 2019-06-29 PROCEDURE — 700105 HCHG RX REV CODE 258: Performed by: EMERGENCY MEDICINE

## 2019-06-29 PROCEDURE — 304561 HCHG STAT O2

## 2019-06-29 PROCEDURE — 99223 1ST HOSP IP/OBS HIGH 75: CPT | Performed by: HOSPITALIST

## 2019-06-29 PROCEDURE — 700102 HCHG RX REV CODE 250 W/ 637 OVERRIDE(OP): Performed by: HOSPITALIST

## 2019-06-29 PROCEDURE — 99214 OFFICE O/P EST MOD 30 MIN: CPT | Mod: 25 | Performed by: NURSE PRACTITIONER

## 2019-06-29 PROCEDURE — 94760 N-INVAS EAR/PLS OXIMETRY 1: CPT | Performed by: NURSE PRACTITIONER

## 2019-06-29 RX ORDER — HYDROCODONE BITARTRATE AND ACETAMINOPHEN 5; 325 MG/1; MG/1
2 TABLET ORAL ONCE
Status: COMPLETED | OUTPATIENT
Start: 2019-06-29 | End: 2019-06-29

## 2019-06-29 RX ORDER — METHOCARBAMOL 750 MG/1
750 TABLET, FILM COATED ORAL 3 TIMES DAILY
Status: DISCONTINUED | OUTPATIENT
Start: 2019-06-29 | End: 2019-07-03 | Stop reason: HOSPADM

## 2019-06-29 RX ORDER — PHENOL 1.4 %
10 AEROSOL, SPRAY (ML) MUCOUS MEMBRANE EVERY EVENING
COMMUNITY
End: 2020-12-16

## 2019-06-29 RX ORDER — FUROSEMIDE 40 MG/1
80 TABLET ORAL DAILY
Status: DISCONTINUED | OUTPATIENT
Start: 2019-06-29 | End: 2019-07-03 | Stop reason: HOSPADM

## 2019-06-29 RX ORDER — SODIUM BICARBONATE 650 MG/1
325 TABLET ORAL
Status: DISCONTINUED | OUTPATIENT
Start: 2019-06-29 | End: 2019-07-03 | Stop reason: HOSPADM

## 2019-06-29 RX ORDER — MORPHINE SULFATE 4 MG/ML
4 INJECTION, SOLUTION INTRAMUSCULAR; INTRAVENOUS ONCE
Status: COMPLETED | OUTPATIENT
Start: 2019-06-29 | End: 2019-06-29

## 2019-06-29 RX ORDER — ALBUTEROL SULFATE 90 UG/1
2 AEROSOL, METERED RESPIRATORY (INHALATION) EVERY 6 HOURS PRN
COMMUNITY
End: 2020-12-22 | Stop reason: SDUPTHER

## 2019-06-29 RX ORDER — TRAZODONE HYDROCHLORIDE 100 MG/1
100 TABLET ORAL
Status: DISCONTINUED | OUTPATIENT
Start: 2019-06-29 | End: 2019-07-03 | Stop reason: HOSPADM

## 2019-06-29 RX ORDER — ACETAMINOPHEN 325 MG/1
650 TABLET ORAL EVERY 6 HOURS PRN
Status: DISCONTINUED | OUTPATIENT
Start: 2019-06-29 | End: 2019-07-03 | Stop reason: HOSPADM

## 2019-06-29 RX ORDER — LACTULOSE 10 G/15ML
10 SOLUTION ORAL 2 TIMES DAILY PRN
COMMUNITY
End: 2019-07-08

## 2019-06-29 RX ORDER — BISACODYL 10 MG
10 SUPPOSITORY, RECTAL RECTAL
Status: DISCONTINUED | OUTPATIENT
Start: 2019-06-29 | End: 2019-07-03 | Stop reason: HOSPADM

## 2019-06-29 RX ORDER — SODIUM BICARBONATE 325 MG/1
325-650 TABLET ORAL 3 TIMES DAILY
Status: DISCONTINUED | OUTPATIENT
Start: 2019-06-29 | End: 2019-06-29

## 2019-06-29 RX ORDER — ACETAMINOPHEN AND CODEINE PHOSPHATE 120; 12 MG/5ML; MG/5ML
0.35 SOLUTION ORAL DAILY
Status: DISCONTINUED | OUTPATIENT
Start: 2019-06-29 | End: 2019-06-29

## 2019-06-29 RX ORDER — POLYETHYLENE GLYCOL 3350 17 G/17G
1 POWDER, FOR SOLUTION ORAL
Status: DISCONTINUED | OUTPATIENT
Start: 2019-06-29 | End: 2019-07-01

## 2019-06-29 RX ORDER — METHYLPREDNISOLONE SODIUM SUCCINATE 40 MG/ML
500 INJECTION, POWDER, LYOPHILIZED, FOR SOLUTION INTRAMUSCULAR; INTRAVENOUS DAILY
Status: DISCONTINUED | OUTPATIENT
Start: 2019-06-30 | End: 2019-06-29

## 2019-06-29 RX ORDER — ZIPRASIDONE HYDROCHLORIDE 80 MG/1
80 CAPSULE ORAL 2 TIMES DAILY
Status: DISCONTINUED | OUTPATIENT
Start: 2019-06-29 | End: 2019-07-03 | Stop reason: HOSPADM

## 2019-06-29 RX ORDER — BUSPIRONE HYDROCHLORIDE 10 MG/1
5 TABLET ORAL 2 TIMES DAILY
Status: DISCONTINUED | OUTPATIENT
Start: 2019-06-29 | End: 2019-07-03 | Stop reason: HOSPADM

## 2019-06-29 RX ORDER — ALBUTEROL SULFATE 90 UG/1
2 AEROSOL, METERED RESPIRATORY (INHALATION) EVERY 4 HOURS PRN
Status: DISCONTINUED | OUTPATIENT
Start: 2019-06-29 | End: 2019-07-03 | Stop reason: HOSPADM

## 2019-06-29 RX ORDER — PREDNISONE 10 MG/1
40 TABLET ORAL DAILY
Qty: 20 TAB | Refills: 0 | Status: CANCELLED | OUTPATIENT
Start: 2019-06-29 | End: 2019-07-04

## 2019-06-29 RX ORDER — SODIUM BICARBONATE 650 MG/1
650 TABLET ORAL EVERY MORNING
Status: DISCONTINUED | OUTPATIENT
Start: 2019-06-30 | End: 2019-07-03 | Stop reason: HOSPADM

## 2019-06-29 RX ORDER — SULFAMETHOXAZOLE AND TRIMETHOPRIM 800; 160 MG/1; MG/1
1 TABLET ORAL 2 TIMES DAILY
Status: ON HOLD | COMMUNITY
Start: 2019-06-11 | End: 2019-07-03

## 2019-06-29 RX ORDER — FLUOXETINE HYDROCHLORIDE 20 MG/1
20 CAPSULE ORAL DAILY
Status: DISCONTINUED | OUTPATIENT
Start: 2019-06-29 | End: 2019-07-03 | Stop reason: HOSPADM

## 2019-06-29 RX ORDER — SODIUM BICARBONATE 325 MG/1
650 TABLET ORAL 2 TIMES DAILY
Status: ON HOLD | COMMUNITY
End: 2019-12-31

## 2019-06-29 RX ORDER — METHYLPREDNISOLONE SODIUM SUCCINATE 125 MG/2ML
500 INJECTION, POWDER, LYOPHILIZED, FOR SOLUTION INTRAMUSCULAR; INTRAVENOUS ONCE
Status: DISCONTINUED | OUTPATIENT
Start: 2019-06-29 | End: 2019-06-29

## 2019-06-29 RX ORDER — SODIUM BICARBONATE 650 MG/1
650 TABLET ORAL EVERY EVENING
Status: DISCONTINUED | OUTPATIENT
Start: 2019-06-29 | End: 2019-07-03 | Stop reason: HOSPADM

## 2019-06-29 RX ORDER — PREGABALIN 100 MG/1
300 CAPSULE ORAL 2 TIMES DAILY
Status: DISCONTINUED | OUTPATIENT
Start: 2019-06-29 | End: 2019-07-03 | Stop reason: HOSPADM

## 2019-06-29 RX ORDER — IPRATROPIUM BROMIDE AND ALBUTEROL SULFATE 2.5; .5 MG/3ML; MG/3ML
3 SOLUTION RESPIRATORY (INHALATION) ONCE
Status: COMPLETED | OUTPATIENT
Start: 2019-06-29 | End: 2019-06-29

## 2019-06-29 RX ORDER — AMOXICILLIN 250 MG
2 CAPSULE ORAL 2 TIMES DAILY
Status: DISCONTINUED | OUTPATIENT
Start: 2019-06-29 | End: 2019-07-03 | Stop reason: HOSPADM

## 2019-06-29 RX ORDER — FUROSEMIDE 80 MG
80 TABLET ORAL
COMMUNITY
End: 2020-08-15

## 2019-06-29 RX ADMIN — ACETAMINOPHEN 650 MG: 325 TABLET, FILM COATED ORAL at 18:00

## 2019-06-29 RX ADMIN — SENNOSIDES, DOCUSATE SODIUM 2 TABLET: 50; 8.6 TABLET, FILM COATED ORAL at 16:33

## 2019-06-29 RX ADMIN — METHOCARBAMOL TABLETS 750 MG: 750 TABLET, COATED ORAL at 14:15

## 2019-06-29 RX ADMIN — SODIUM CHLORIDE 500 MG: 9 INJECTION, SOLUTION INTRAVENOUS at 12:06

## 2019-06-29 RX ADMIN — IOHEXOL 80 ML: 350 INJECTION, SOLUTION INTRAVENOUS at 12:36

## 2019-06-29 RX ADMIN — PREGABALIN 300 MG: 100 CAPSULE ORAL at 16:32

## 2019-06-29 RX ADMIN — SODIUM BICARBONATE 325 MG: 650 TABLET ORAL at 14:46

## 2019-06-29 RX ADMIN — ENOXAPARIN SODIUM 40 MG: 100 INJECTION SUBCUTANEOUS at 14:00

## 2019-06-29 RX ADMIN — BUSPIRONE HYDROCHLORIDE 5 MG: 10 TABLET ORAL at 16:33

## 2019-06-29 RX ADMIN — FLUOXETINE HYDROCHLORIDE 20 MG: 20 CAPSULE ORAL at 14:00

## 2019-06-29 RX ADMIN — FUROSEMIDE 80 MG: 40 TABLET ORAL at 14:00

## 2019-06-29 RX ADMIN — IPRATROPIUM BROMIDE AND ALBUTEROL SULFATE 3 ML: 2.5; .5 SOLUTION RESPIRATORY (INHALATION) at 09:23

## 2019-06-29 RX ADMIN — INSULIN HUMAN 3 UNITS: 100 INJECTION, SOLUTION PARENTERAL at 16:38

## 2019-06-29 RX ADMIN — TRAZODONE HYDROCHLORIDE 100 MG: 100 TABLET ORAL at 21:14

## 2019-06-29 RX ADMIN — METHOCARBAMOL TABLETS 750 MG: 750 TABLET, COATED ORAL at 16:34

## 2019-06-29 RX ADMIN — ZIPRASIDONE HYDROCHLORIDE 80 MG: 80 CAPSULE ORAL at 16:33

## 2019-06-29 RX ADMIN — ASPIRIN 81 MG: 81 TABLET, COATED ORAL at 14:45

## 2019-06-29 RX ADMIN — MORPHINE SULFATE 4 MG: 4 INJECTION INTRAVENOUS at 13:24

## 2019-06-29 RX ADMIN — HYDROCODONE BITARTRATE AND ACETAMINOPHEN 2 TABLET: 5; 325 TABLET ORAL at 11:46

## 2019-06-29 RX ADMIN — SODIUM BICARBONATE 650 MG: 650 TABLET ORAL at 16:33

## 2019-06-29 ASSESSMENT — ENCOUNTER SYMPTOMS
DIFFICULTY BREATHING: 1
NAUSEA: 0
EYE ITCHING: 0
FEVER: 0
HEMOPTYSIS: 0
FEVER: 0
EYE PAIN: 1
EYE PAIN: 1
PHOTOPHOBIA: 1
ABDOMINAL PAIN: 0
ORTHOPNEA: 0
SWOLLEN GLANDS: 0
COUGH: 0
CHILLS: 0
BLURRED VISION: 1
EYE REDNESS: 0
RHINORRHEA: 0
DIZZINESS: 0
SORE THROAT: 0
SHORTNESS OF BREATH: 1
DOUBLE VISION: 0
EYE DISCHARGE: 0
MYALGIAS: 0
CHILLS: 0
VOMITING: 0
LEG PAIN: 0
SPUTUM PRODUCTION: 0
FOREIGN BODY SENSATION: 0

## 2019-06-29 ASSESSMENT — COPD QUESTIONNAIRES
DURING THE PAST 4 WEEKS HOW MUCH DID YOU FEEL SHORT OF BREATH: SOME OF THE TIME
HAVE YOU SMOKED AT LEAST 100 CIGARETTES IN YOUR ENTIRE LIFE: NO/DON'T KNOW
DO YOU EVER COUGH UP ANY MUCUS OR PHLEGM?: YES, A FEW DAYS A WEEK OR MONTH
COPD SCREENING SCORE: 2

## 2019-06-29 ASSESSMENT — COGNITIVE AND FUNCTIONAL STATUS - GENERAL
SUGGESTED CMS G CODE MODIFIER DAILY ACTIVITY: CH
CLIMB 3 TO 5 STEPS WITH RAILING: A LITTLE
MOBILITY SCORE: 23
DAILY ACTIVITIY SCORE: 24
SUGGESTED CMS G CODE MODIFIER MOBILITY: CI

## 2019-06-29 ASSESSMENT — PATIENT HEALTH QUESTIONNAIRE - PHQ9
2. FEELING DOWN, DEPRESSED, IRRITABLE, OR HOPELESS: NOT AT ALL
1. LITTLE INTEREST OR PLEASURE IN DOING THINGS: NOT AT ALL
SUM OF ALL RESPONSES TO PHQ9 QUESTIONS 1 AND 2: 0
SUM OF ALL RESPONSES TO PHQ9 QUESTIONS 1 AND 2: 0
1. LITTLE INTEREST OR PLEASURE IN DOING THINGS: NOT AT ALL

## 2019-06-29 ASSESSMENT — LIFESTYLE VARIABLES
EVER_SMOKED: NEVER
EVER_SMOKED: NEVER
DO YOU DRINK ALCOHOL: NO

## 2019-06-29 NOTE — ED NOTES
Med rec complete per patient with medication list  Allergies reviewed    Patient completed a 3 day Bactrim course 6-14-19    Family member will bring in Corlanor    Patient applies Voltaren on wrist and feet

## 2019-06-29 NOTE — ED NOTES
Ambulatory to room with walker. Agree with triage note. Pt states she has continued to taper her prednisone and is now down to 10mg per day. Left eye pain and blurring returned two days ago. Chart up for ERP.

## 2019-06-29 NOTE — ED PROVIDER NOTES
"ED Provider Note    CHIEF COMPLAINT  Chief Complaint   Patient presents with   • Eye Pain     patient c/o left eye pain with blurry vision x 2 days. patient recently admitted to [facility] for infection to same eye.       HPI  Kristin Balderrama is a 28 y.o. female who presents with 2 days of severe left eye pain worse with movement and associated with photophobia, decreased visual acuity in the eye and decreased color vision.  History of myopia and wears glasses but not contacts.  She was diagnosed with optic neuritis earlier this spring and is on her second course of tapered prednisone after she had recurrent optic neuritis.  Her symptoms began when she transition to 10 mg a day from 20 a day 2 days ago.  No MS known.  She may have an unknown autoimmune condition.  She does have diabetes.  She has asthma and was at urgent care today is improved with albuterol.  She was told she would need increased steroids for her asthma and urgent care today.  Her ophthalmologist is Dr. Jeffery away.  She has a bladder stimulator which is why she has not had an MRI orbit and brain.    REVIEW OF SYSTEMS  Pertinent positives include: Left eye pain, vision loss and decreased color vision.  Pertinent negatives include: Fever, rhinorrhea, sore throat, cough, red eye, tearing, mucus discharge, other weakness or numbness.  10+ systems reviewed and negative.      PAST MEDICAL HISTORY  Past Medical History:   Diagnosis Date   • Abdominal pain    • Anginal syndrome     random chest pain especially with tachycardia   • Apnea, sleep    • Arrhythmia     \"sinus tachycardia\", cariologist, Dr. Kumar; ablation 2/2016   • Arthritis     osteo   • ASTHMA     when around smoke   • Atrial fibrillation (HCC)    • Back pain    • Borderline personality disorder (HCC)    • Breath shortness     with tachycardia   • Bronchitis    • Cardiac arrhythmia    • Chickenpox    • Chronic UTI 9/18/2010   • Cough    • Daytime sleepiness    • Depression    • Diabetes " "([facility])    • Diarrhea    • Disorder of thyroid    • Fall    • Fatigue    • Frequent headaches    • Gasping for breath    • Gynecological disorder     PCOS   • Headache(784.0)    • Heart burn    • History of falling    • Hypertension    • Indigestion    • Migraine    • 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HCC)    • 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HCC)    • Nausea    • Obesity    • Pain 08-15-12    back, 7/10   • Painful joint    • PCOS (polycystic ovarian syndrome)    • Pneumonia 2012   • Psychosis ([facility])    • Renal disorder     \"kidney disease, stage 1\" nephrologist, Dr. 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   • Urinary bladder disorder     Suprapubic cath   • Urinary incontinence    • Weakness    • Wears glasses        FAMILY HISTORY  Family History   Problem Relation Age of Onset   • Hypertension Mother    • Sleep Apnea Mother    • Heart Disease Mother    • Other Mother         hypothryod   • Hypertension Maternal Uncle    • Heart Disease Maternal Grandmother    • Hypertension Maternal Grandmother    • No Known Problems Sister    • Other Sister         Narcolepsy;fibromyalsia;bone;nerve   • Genitourinary () Sister         endometriosis       SOCIAL HISTORY  Social History   Substance Use Topics   • Smoking status: Never Smoker   • Smokeless tobacco: Never Used   • Alcohol use No     History   Drug Use   • Frequency: 7.0 times per week   • Types: Marijuana, Oral     Comment: Medicinal edible's       SURGICAL HISTORY  Past Surgical History:   Procedure Laterality Date   • MUSCLE BIOPSY Right 1/26/2017    Procedure: MUSCLE BIOPSY - THIGH;  Surgeon: Isidro Vigil M.D.;  Location: SURGERY [facility];  Service:    • GASTROSCOPY WITH BALLOON DILATATION N/A 1/4/2017    Procedure: GASTROSCOPY WITH DILATATION;  " Surgeon: Torres Vargas M.D.;  Location: SURGERY [facility];  Service:    • BOWEL STIMULATOR INSERTION  7/13/2016    Procedure: BOWEL STIMULATOR INSERTION FOR PERMANENT INTERSTIM SACRAL IMPLANT;  Surgeon: Joe Noyola M.D.;  Location: SURGERY [facility];  Service:    • RECOVERY  1/27/2016    Procedure: CATH LAB EP STUDY WITH SINUS NODE MODIFICATION LA;  Surgeon: [facility];  Location: SURGERY PRE-POST PROC UNIT [facility];  Service:    • OTHER CARDIAC SURGERY  1/2016    cardiac ablation   • NEURO DEST FACET L/S W/IG SNGL  4/21/2015    Performed by Reza Tabor at SURGERY [facility]   • LUMBAR FUSION ANTERIOR  8/21/2012    Performed by SUSIE SAWANT at SURGERY [facility]   • ALYSSA BY LAPAROSCOPY  8/29/2010    Performed by SHAYY JOHNS at SURGERY [facility]   • LAMINOTOMY     • OTHER ABDOMINAL SURGERY     • TONSILLECTOMY      tonsillectomy       CURRENT MEDICATIONS    Current Outpatient Prescriptions   Medication Sig Dispense Refill   • gabapentin (NEURONTIN) 300 MG Cap Take 300 mg by mouth 4 times a day.     • predniSONE (DELTASONE) 10 MG Tab Take 40 mg by mouth every day.     • busPIRone (BUSPAR) 5 MG tablet TAKE ONE TABLET BY MOUTH TWICE DAILY 60 Tab 4   • phenazopyridine (PYRIDIUM) 200 MG Tab Take 1 Tab by mouth 3 times a day as needed. 30 Tab 2   • ibuprofen (MOTRIN) 800 MG Tab Take 1 Tab by mouth every 8 hours as needed for Moderate Pain. 30 Tab 0   • Ivabradine HCl 7.5 MG Tab Take 7.5 mg by mouth 2 Times a Day.     • vitamin D, Ergocalciferol, (DRISDOL) 70483 units Cap capsule Take 50,000 Units by mouth every Friday.     • norethindrone (DEBLITANE) 0.35 MG tablet Take 0.35 mg by mouth every day.     • ziprasidone (GEODON) 80 MG Cap Take 80 mg by mouth 2 Times a Day.     • methocarbamol (ROBAXIN) 750 MG Tab Take 750 mg by mouth 3 times a day.     • Diclofenac Sodium (VOLTAREN) 1 % Gel Apply 1 Inch to skin as directed 4 times a day. 5 Tube 6   • sodium bicarbonate (SODIUM  "BICARBONATE) 650 MG Tab Take 1 Tab by mouth 2 times a day. 360 Tab 3   • FLUoxetine (PROZAC) 10 MG Cap TAKE ONE CAPSULE BY MOUTH ONCE A DAY 30 Cap 2   • traZODone (DESYREL) 100 MG Tab TAKE  ONE TABLET BY MOUTH NIGHTLY AT BEDTIME 90 Tab 2   • Diphenhydramine-APAP, sleep, (TYLENOL PM EXTRA STRENGTH PO) Take 1 Cap by mouth every day.     • LYRICA 300 MG capsule Take 300 mg by mouth 2 times a day.     • furosemide (LASIX) 40 MG Tab Take 80 mg by mouth every day.     • aspirin EC (ECOTRIN) 81 MG Tablet Delayed Response Take 1 Tab by mouth every day. 30 Tab 6       ALLERGIES  Allergies   Allergen Reactions   • Cefdinir Shortness of Breath and Itching     Tolerated 1/18/17  Tolerates ceftriaxone    • Depakote [Divalproex Sodium] Unspecified     Muscle spasms/muscle pain and weakness     • Doxycycline Anaphylaxis and Vomiting     RXN=unknown   • Abilify Unspecified     Headaches/muscle twitching     • Amitriptyline Unspecified     Headaches     • Amoxicillin Rash     Pt states \"I get a rash\".     • Ciprofloxacin Rash     Pt states \"I get a rash\".     • Clindamycin Nausea     Even with food     • Ees [Erythromycin] Vomiting and Nausea   • Flagyl [Metronidazole Hcl] Unspecified     \"eye problems\"     • Flomax [Tamsulosin Hydrochloride] Swelling   • Metformin Unspecified     Increased lactic acid      • Tape Rash     Tears skin off  coban with Tegaderm tape ok intermittently  RXN=ongoing   • Vancomycin Itching     Pt becomes flushed in face and chest.   RXN=7/10/16   • Wound Dressing Adhesive Hives     By pt report   • Cephalexin [Keflex] Rash     Pt states she gets a rash with this medication  Tolerates ceftriaxone   • Levofloxacin Unspecified     Leg muscle cramps   • Metronidazole Rash     .   • Valproic Acid Rash     .       PHYSICAL EXAM  VITAL SIGNS: /87   Pulse (!) 116   Temp 36.3 °C (97.4 °F) (Temporal)   Resp 20   Ht 1.651 m (5' 5\")   Wt (!) 128.4 kg (283 lb 1.1 oz)   SpO2 92%   BMI 47.11 kg/m²   Reviewed " and tachycardic, hypertensive, afebrile, moderately obese  Constitutional: Well developed, Well nourished, appears to be in at least mild to moderate pain.  HENT: Normocephalic, atraumatic, bilateral external ears normal, oropharynx moist, No exudates or erythema.   Eyes: Pupils 3 mm without erythema or discharge.  Positive photophobia left eye.  Mild aspheric pupillary defect.  Fundus not visualized.  Visual fields impaired by quadrants on the left compared to the right.  Could count fingers only in the left lower temporal field of the left eye..  Visual acuity with glasses 20/25 OD and 20/100 OS  Cardiovascular: Regular S1-S2 without murmur, rub, gallop.  Respiratory: No rales, rhonchi, wheeze.  Gastrointestinal: Soft, obese, nontender, nondistended.  Skin: No erythema, no rash.   Genitourinary:  No costovertebral angle tenderness.   Neurologic: Alert & oriented x 3, cranial nerves 2-12 intact except for decreased visual acuity left eye.  Moves all extremities with symmetric strength..  No focal deficit noted.  Psychiatric: Affect normal, Judgment normal, Mood normal.     DIFFERENTIAL DIAGNOSIS:  Optic neuritis, multiple sclerosis, doubt glaucoma.    RADIOLOGY/PROCEDURES  [id number] WITH   Final Result      No periorbital or retro-orbital inflammatory changes are identified.      MRI is suggested for further evaluation of patient's symptoms.      Minimal mucosal thickening in the maxillary sinuses.      Mild asymmetric mucosal thickening along the nasal turbinates on the right.             LABORATORY:  Results for orders placed or performed during the hospital encounter of 06/29/19   CBC WITH DIFFERENTIAL   Result Value Ref Range    WBC 11.2 (H) 4.8 - 10.8 K/uL    RBC 4.51 4.20 - 5.40 M/uL    Hemoglobin 14.1 12.0 - 16.0 g/dL    Hematocrit 43.0 37.0 - 47.0 %    MCV 95.3 81.4 - 97.8 fL    MCH 31.3 27.0 - 33.0 pg    MCHC 32.8 (L) 33.6 - 35.0 g/dL    RDW 49.5 35.9 - 50.0 fL    Platelet Count 152 (L) 164 - 446  K/uL    MPV 10.4 9.0 - 12.9 fL    Neutrophils-Polys 83.00 (H) 44.00 - 72.00 %    Lymphocytes 10.50 (L) 22.00 - 41.00 %    Monocytes 4.80 0.00 - 13.40 %    Eosinophils 0.20 0.00 - 6.90 %    Basophils 0.30 0.00 - 1.80 %    Immature Granulocytes 1.20 (H) 0.00 - 0.90 %    Nucleated RBC 0.00 /100 WBC    Neutrophils (Absolute) 9.28 (H) 2.00 - 7.15 K/uL    Lymphs (Absolute) 1.17 1.00 - 4.80 K/uL    Monos (Absolute) 0.54 0.00 - 0.85 K/uL    Eos (Absolute) 0.02 0.00 - 0.51 K/uL    Baso (Absolute) 0.03 0.00 - 0.12 K/uL    Immature Granulocytes (abs) 0.13 (H) 0.00 - 0.11 K/uL    NRBC (Absolute) 0.00 K/uL   Basic Metabolic Panel   Result Value Ref Range    Sodium 140 135 - 145 mmol/L    Potassium 4.2 3.6 - 5.5 mmol/L    Chloride 104 96 - 112 mmol/L    Co2 22 20 - 33 mmol/L    Glucose 177 (H) 65 - 99 mg/dL    Bun 20 8 - 22 mg/dL    Creatinine 0.81 0.50 - 1.40 mg/dL    Calcium 9.4 8.5 - 10.5 mg/dL    Anion Gap 14.0 (H) 0.0 - 11.9   WESTERGREN SED RATE   Result Value Ref Range    Sed Rate Westergren 2 0 - 20 mm/hour   CRP HIGH SENSITIVE (CARDIAC)   Result Value Ref Range    C Reactive Protein High Sensitive 1.3 0.0 - 7.5 mg/L     *Note: Due to a large number of results and/or encounters for the requested time period, some results have not been displayed. A complete set of results can be found in Results Review.       INTERVENTIONS:  Medications   methylPREDNISolone sod succ (SOLU-MEDROL) 125 MG injection 500 mg (not administered)   HYDROcodone-acetaminophen (NORCO) 5-325 MG per tablet 2 Tab (not administered)       COURSE & MEDICAL DECISION MAKING  Case discussed with Dr. Ramirez neuro-ophthalmology who recommended admission on 500 mg of Solu-Medrol IV days.  He will see the patient in consultation tomorrow.  He requested CT orbit with and without contrast.    Case discussed with Dr. Salcido hospitalist who will admit the patient    This patient presents with a recurrence of optic neuritis involving the left eye.  She has a  history of diabetes.  There is no known multiple sclerosis or other patient will be treated with high-dose IV steroids and will receive neuro-ophthalmology consultation tomorrow..    PLAN:  As above    CONDITION: Fair.    FINAL IMPRESSION  1. Optic neuritis          Electronically signed by: Joe Robertson, 6/29/2019 11:26 AM

## 2019-06-29 NOTE — ED TRIAGE NOTES
".Kristin Balderrama  .  Chief Complaint   Patient presents with   • Eye Pain     patient c/o left eye pain with blurry vision x 2 days. patient recently admitted to [facility] for infection to same eye.     Patient to triage with above complaint. .Blood Pressure: 145/87, Pulse: (!) 116, Respiration: 20, Temperature: 36.3 °C (97.4 °F), Height: 165.1 cm (5' 5\"), Weight: (!) 128.4 kg (283 lb 1.1 oz), BMI (Calculated): 47.11, BSA (Calculated): 2.4, Pulse Oximetry: 92 %, O2 Delivery: None (Room Air)    Patient to lobby and instructed to inform staff of any needs.  "

## 2019-06-29 NOTE — PROGRESS NOTES
Subjective:   Kristin Balderrama is a 28 y.o. female who presents for Breathing Problem (having a hard time breathing she has asthma and left eye )        Breathing Problem   This is a new problem. The current episode started today. The problem occurs constantly. The problem has been unchanged. Pertinent negatives include no abdominal pain, chest pain, coryza, fever, hemoptysis, leg pain, leg swelling, orthopnea, rash, rhinorrhea, sore throat, sputum production, swollen glands or vomiting. Nothing aggravates the symptoms. Risk factors include prolonged immobilization. She has tried beta agonist inhalers and oral steroids (20 mg oral prednisone this am) for the symptoms. The treatment provided no relief. Her past medical history is significant for asthma. (4L n/c, chronic resp failure)   Eye Problem    The left eye is affected. This is a new (hx of optic neuritis ) problem. The current episode started today. The problem occurs constantly. The problem has been unchanged. There was no injury mechanism. The pain is at a severity of 2/10. The pain is mild. There is no known exposure to pink eye. She does not wear contacts. Pertinent negatives include no eye discharge, double vision, eye redness, fever, foreign body sensation, itching, nausea, recent URI or vomiting. She has tried nothing for the symptoms. The treatment provided no relief.     Review of Systems   Constitutional: Negative for chills and fever.   HENT: Negative for rhinorrhea and sore throat.    Eyes: Positive for pain. Negative for double vision, discharge, redness and itching.   Respiratory: Positive for shortness of breath. Negative for cough, hemoptysis and sputum production.    Cardiovascular: Negative for chest pain, orthopnea and leg swelling.   Gastrointestinal: Negative for abdominal pain, nausea and vomiting.   Genitourinary: Negative for hematuria.   Musculoskeletal: Negative for myalgias.   Skin: Negative for rash.   Neurological: Negative for  "dizziness.     Allergies   Allergen Reactions   • Cefdinir Shortness of Breath and Itching     Tolerated 1/18/17  Tolerates ceftriaxone    • Depakote [Divalproex Sodium] Unspecified     Muscle spasms/muscle pain and weakness     • Doxycycline Anaphylaxis and Vomiting     RXN=unknown   • Abilify Unspecified     Headaches/muscle twitching     • Amitriptyline Unspecified     Headaches     • Amoxicillin Rash     Pt states \"I get a rash\".     • Ciprofloxacin Rash     Pt states \"I get a rash\".     • Clindamycin Nausea     Even with food     • Ees [Erythromycin] Vomiting and Nausea   • Flagyl [Metronidazole Hcl] Unspecified     \"eye problems\"     • Flomax [Tamsulosin Hydrochloride] Swelling   • Metformin Unspecified     Increased lactic acid      • Tape Rash     Tears skin off  coban with Tegaderm tape ok intermittently  RXN=ongoing   • Vancomycin Itching     Pt becomes flushed in face and chest.   RXN=7/10/16   • Wound Dressing Adhesive Hives     By pt report   • Cephalexin [Keflex] Rash     Pt states she gets a rash with this medication  Tolerates ceftriaxone   • Levofloxacin Unspecified     Leg muscle cramps   • Metronidazole Rash     .   • Valproic Acid Rash     .      Objective:   /72 (BP Location: Right arm, Patient Position: Sitting, BP Cuff Size: Adult)   Pulse (!) 102   Temp 37.3 °C (99.1 °F) (Temporal)   Ht 1.651 m (5' 5\")   Wt (!) 127.9 kg (282 lb)   SpO2 94%   BMI 46.93 kg/m²   Physical Exam   Constitutional: She is oriented to person, place, and time. She appears well-developed and well-nourished. No distress.   HENT:   Head: Normocephalic and atraumatic.   Right Ear: Tympanic membrane normal.   Left Ear: Tympanic membrane normal.   Nose: Nose normal. Right sinus exhibits no maxillary sinus tenderness and no frontal sinus tenderness. Left sinus exhibits no maxillary sinus tenderness and no frontal sinus tenderness.   Mouth/Throat: Uvula is midline, oropharynx is clear and moist and mucous membranes " are normal. No posterior oropharyngeal edema, posterior oropharyngeal erythema or tonsillar abscesses. No tonsillar exudate.   Eyes: Pupils are equal, round, and reactive to light. Conjunctivae, EOM and lids are normal. Right eye exhibits no discharge. Left eye exhibits no chemosis, no discharge, no exudate and no hordeolum. No foreign body present in the left eye. Right conjunctiva is not injected. Left conjunctiva is not injected. No scleral icterus. Right eye exhibits normal extraocular motion. Left eye exhibits normal extraocular motion.   Fundoscopic exam:       The left eye shows no AV nicking, no hemorrhage and no papilledema.   Right eye: 20/40  Left eye: 20/40    Cardiovascular: Normal rate and regular rhythm.    No murmur heard.  Pulmonary/Chest: Effort normal and breath sounds normal. Tachypnea noted. No respiratory distress. She has no decreased breath sounds. She has no wheezes. She has no rhonchi. She has no rales.   Abdominal: Soft. She exhibits no distension. There is no tenderness.   Neurological: She is alert and oriented to person, place, and time. She has normal reflexes. No sensory deficit.   Skin: Skin is warm, dry and intact.   Psychiatric: She has a normal mood and affect.         Assessment/Plan:     1. SOB (shortness of breath)  ipratropium-albuterol (DUONEB) nebulizer solution    DX-CHEST-2 VIEWS   2. Pain of left eye     3. Moderate persistent asthma with exacerbation       Lung sounds improved with breathing treatment. PO2 reassessed and adequate 98% on 4L.   Xray results  No acute cardiopulmonary process is identified.      Patient is a 28-year-old female with multiple comorbidities who presented with the stated above.  Patient verbalizing significant improvement post DuoNeb treatment.  Decreased work of breathing was noted.  Patient with new onset left eye pain this morning, which patient has a history of optic neuritis.   Had discussed starting patient on high-dose steroid for asthma  and eye pain however patient wishing to go to the emergency room at this time to be evaluated as she is unable to see her ophthalmologist until next week.

## 2019-06-30 ENCOUNTER — APPOINTMENT (OUTPATIENT)
Dept: RADIOLOGY | Facility: MEDICAL CENTER | Age: 29
DRG: 123 | End: 2019-06-30
Attending: INTERNAL MEDICINE
Payer: MEDICARE

## 2019-06-30 LAB
BACTERIA UR CULT: NORMAL
GLUCOSE BLD-MCNC: 133 MG/DL (ref 65–99)
GLUCOSE BLD-MCNC: 157 MG/DL (ref 65–99)
GLUCOSE BLD-MCNC: 162 MG/DL (ref 65–99)
GLUCOSE BLD-MCNC: 171 MG/DL (ref 65–99)
SIGNIFICANT IND 70042: NORMAL
SITE SITE: NORMAL
SOURCE SOURCE: NORMAL

## 2019-06-30 PROCEDURE — 99232 SBSQ HOSP IP/OBS MODERATE 35: CPT | Performed by: INTERNAL MEDICINE

## 2019-06-30 PROCEDURE — 700111 HCHG RX REV CODE 636 W/ 250 OVERRIDE (IP): Performed by: HOSPITALIST

## 2019-06-30 PROCEDURE — 700102 HCHG RX REV CODE 250 W/ 637 OVERRIDE(OP): Performed by: HOSPITALIST

## 2019-06-30 PROCEDURE — A9270 NON-COVERED ITEM OR SERVICE: HCPCS | Performed by: HOSPITALIST

## 2019-06-30 PROCEDURE — 700105 HCHG RX REV CODE 258: Performed by: HOSPITALIST

## 2019-06-30 PROCEDURE — 700102 HCHG RX REV CODE 250 W/ 637 OVERRIDE(OP): Performed by: INTERNAL MEDICINE

## 2019-06-30 PROCEDURE — A9270 NON-COVERED ITEM OR SERVICE: HCPCS | Performed by: INTERNAL MEDICINE

## 2019-06-30 PROCEDURE — 82962 GLUCOSE BLOOD TEST: CPT

## 2019-06-30 PROCEDURE — 770006 HCHG ROOM/CARE - MED/SURG/GYN SEMI*

## 2019-06-30 RX ORDER — PHENAZOPYRIDINE HYDROCHLORIDE 100 MG/1
100 TABLET, FILM COATED ORAL
Status: COMPLETED | OUTPATIENT
Start: 2019-06-30 | End: 2019-07-02

## 2019-06-30 RX ORDER — CIPROFLOXACIN HYDROCHLORIDE 3.5 MG/ML
5 SOLUTION/ DROPS TOPICAL 2 TIMES DAILY
Status: DISCONTINUED | OUTPATIENT
Start: 2019-06-30 | End: 2019-06-30

## 2019-06-30 RX ORDER — HYDROCODONE BITARTRATE AND ACETAMINOPHEN 10; 325 MG/1; MG/1
1 TABLET ORAL
Status: COMPLETED | OUTPATIENT
Start: 2019-06-30 | End: 2019-06-30

## 2019-06-30 RX ORDER — HYDROCORTISONE ACETATE 25 MG/1
25 SUPPOSITORY RECTAL EVERY 12 HOURS
Status: DISCONTINUED | OUTPATIENT
Start: 2019-06-30 | End: 2019-07-03 | Stop reason: HOSPADM

## 2019-06-30 RX ORDER — OXYCODONE HYDROCHLORIDE 5 MG/1
5 TABLET ORAL EVERY 4 HOURS PRN
Status: DISCONTINUED | OUTPATIENT
Start: 2019-06-30 | End: 2019-07-01

## 2019-06-30 RX ORDER — BENZOCAINE/MENTHOL 6 MG-10 MG
LOZENGE MUCOUS MEMBRANE DAILY
Status: DISCONTINUED | OUTPATIENT
Start: 2019-06-30 | End: 2019-06-30

## 2019-06-30 RX ORDER — CIPROFLOXACIN HYDROCHLORIDE 3.5 MG/ML
5 SOLUTION/ DROPS TOPICAL 2 TIMES DAILY
Status: DISCONTINUED | OUTPATIENT
Start: 2019-06-30 | End: 2019-07-03 | Stop reason: HOSPADM

## 2019-06-30 RX ADMIN — TRAZODONE HYDROCHLORIDE 100 MG: 100 TABLET ORAL at 21:02

## 2019-06-30 RX ADMIN — ACETAMINOPHEN 650 MG: 325 TABLET, FILM COATED ORAL at 01:13

## 2019-06-30 RX ADMIN — ENOXAPARIN SODIUM 40 MG: 100 INJECTION SUBCUTANEOUS at 05:56

## 2019-06-30 RX ADMIN — SODIUM BICARBONATE 325 MG: 650 TABLET ORAL at 14:38

## 2019-06-30 RX ADMIN — SODIUM CHLORIDE 500 MG: 9 INJECTION, SOLUTION INTRAVENOUS at 05:55

## 2019-06-30 RX ADMIN — METHOCARBAMOL TABLETS 750 MG: 750 TABLET, COATED ORAL at 05:58

## 2019-06-30 RX ADMIN — CIPROFLOXACIN HYDROCHLORIDE 5 DROP: 3.5 SOLUTION/ DROPS TOPICAL at 16:00

## 2019-06-30 RX ADMIN — POLYETHYLENE GLYCOL 3350 1 PACKET: 17 POWDER, FOR SOLUTION ORAL at 20:59

## 2019-06-30 RX ADMIN — ASPIRIN 81 MG: 81 TABLET, COATED ORAL at 06:00

## 2019-06-30 RX ADMIN — OXYCODONE HYDROCHLORIDE 5 MG: 5 TABLET ORAL at 10:35

## 2019-06-30 RX ADMIN — ZIPRASIDONE HYDROCHLORIDE 80 MG: 80 CAPSULE ORAL at 05:57

## 2019-06-30 RX ADMIN — PREGABALIN 300 MG: 100 CAPSULE ORAL at 05:57

## 2019-06-30 RX ADMIN — SODIUM BICARBONATE 650 MG: 650 TABLET ORAL at 16:37

## 2019-06-30 RX ADMIN — SODIUM BICARBONATE 650 MG: 650 TABLET ORAL at 05:57

## 2019-06-30 RX ADMIN — INSULIN HUMAN 3 UNITS: 100 INJECTION, SOLUTION PARENTERAL at 06:44

## 2019-06-30 RX ADMIN — PREGABALIN 300 MG: 100 CAPSULE ORAL at 16:37

## 2019-06-30 RX ADMIN — SENNOSIDES, DOCUSATE SODIUM 2 TABLET: 50; 8.6 TABLET, FILM COATED ORAL at 05:56

## 2019-06-30 RX ADMIN — ZIPRASIDONE HYDROCHLORIDE 80 MG: 80 CAPSULE ORAL at 16:37

## 2019-06-30 RX ADMIN — OXYCODONE HYDROCHLORIDE 5 MG: 5 TABLET ORAL at 18:48

## 2019-06-30 RX ADMIN — HYDROCORTISONE: 10 CREAM TOPICAL at 10:36

## 2019-06-30 RX ADMIN — HYDROCODONE BITARTRATE AND ACETAMINOPHEN 1 TABLET: 10; 325 TABLET ORAL at 04:08

## 2019-06-30 RX ADMIN — METHOCARBAMOL TABLETS 750 MG: 750 TABLET, COATED ORAL at 11:31

## 2019-06-30 RX ADMIN — BUSPIRONE HYDROCHLORIDE 5 MG: 10 TABLET ORAL at 16:37

## 2019-06-30 RX ADMIN — BUSPIRONE HYDROCHLORIDE 5 MG: 10 TABLET ORAL at 06:04

## 2019-06-30 RX ADMIN — FUROSEMIDE 80 MG: 40 TABLET ORAL at 06:06

## 2019-06-30 RX ADMIN — INSULIN HUMAN 3 UNITS: 100 INJECTION, SOLUTION PARENTERAL at 11:28

## 2019-06-30 RX ADMIN — SENNOSIDES, DOCUSATE SODIUM 2 TABLET: 50; 8.6 TABLET, FILM COATED ORAL at 16:37

## 2019-06-30 RX ADMIN — INSULIN HUMAN 3 UNITS: 100 INJECTION, SOLUTION PARENTERAL at 16:49

## 2019-06-30 RX ADMIN — PHENAZOPYRIDINE HYDROCHLORIDE 100 MG: 100 TABLET ORAL at 16:38

## 2019-06-30 RX ADMIN — FLUOXETINE HYDROCHLORIDE 20 MG: 20 CAPSULE ORAL at 06:05

## 2019-06-30 RX ADMIN — OXYCODONE HYDROCHLORIDE 5 MG: 5 TABLET ORAL at 23:25

## 2019-06-30 RX ADMIN — PHENAZOPYRIDINE HYDROCHLORIDE 100 MG: 100 TABLET ORAL at 10:35

## 2019-06-30 RX ADMIN — METHOCARBAMOL TABLETS 750 MG: 750 TABLET, COATED ORAL at 16:37

## 2019-06-30 RX ADMIN — OXYCODONE HYDROCHLORIDE 5 MG: 5 TABLET ORAL at 14:40

## 2019-06-30 RX ADMIN — HYDROCORTISONE ACETATE 25 MG: 25 SUPPOSITORY RECTAL at 16:39

## 2019-06-30 ASSESSMENT — ENCOUNTER SYMPTOMS
HEADACHES: 0
DEPRESSION: 0
BRUISES/BLEEDS EASILY: 0
SHORTNESS OF BREATH: 1
WEIGHT LOSS: 0
POLYDIPSIA: 0
WHEEZING: 0
TINGLING: 0
CHILLS: 0
FEVER: 0
BACK PAIN: 0
DIZZINESS: 0
ORTHOPNEA: 0
VOMITING: 0
PALPITATIONS: 0
HEARTBURN: 0
MYALGIAS: 0
ABDOMINAL PAIN: 0
NECK PAIN: 0
NAUSEA: 0
SPUTUM PRODUCTION: 0
PHOTOPHOBIA: 1
COUGH: 0
EYE PAIN: 1
HEMOPTYSIS: 0
BLURRED VISION: 1
TREMORS: 0
DOUBLE VISION: 0

## 2019-06-30 ASSESSMENT — LIFESTYLE VARIABLES: SUBSTANCE_ABUSE: 0

## 2019-06-30 NOTE — ASSESSMENT & PLAN NOTE
Patient has been treated for UTI about 2 weeks ago with Bactrim for 3 days.  Recent urine culture 6/27/2019.   Reports dysuria second day after admission  Pyridium for symptoms  Patient has outpatient follow-up with urology Dr. Rosenbaum.  No fevers-follow clinically  Fu repeat cultures.

## 2019-06-30 NOTE — ASSESSMENT & PLAN NOTE
Ms. Balderrama is not on insulin as an outpatient and no medication.  A1c 6.1.  Mildly hyperglycemic  As she now is on IV steroids, she will likely require insulin which has been ordered for sliding scale  Outpatient follow-up, advised weight loss  Outpatient monitoring, follow-up - may be candidate for metformin.

## 2019-06-30 NOTE — PROGRESS NOTES
AOx4, c/o 7/10 pain in left eye. 1x dose of Norco ordered overnight, PRN Tylenol not effective, per patient. IV Solumedrol given this AM. Ambulatory w/ FWW. 2L NC. ACHS. Diabetic diet. Call light in place, fall precautions.

## 2019-06-30 NOTE — CARE PLAN
Problem: Safety  Goal: Will remain free from falls    Intervention: Implement fall precautions  Fall precautions in place, bed alarm on. Call light and personal belongings within reach.       Problem: Pain Management  Goal: Pain level will decrease to patient's comfort goal    Intervention: Follow pain managment plan developed in collaboration with patient and Interdisciplinary Team  Pt medicated for pain per MAR.

## 2019-06-30 NOTE — DIETARY
NUTRITION SERVICES: BMI - Pt with BMI >40 (=Body mass index is 47.07 kg/m².), class III (extreme) obesity. Weight loss counseling not appropriate in acute care setting.  Wound Care consult pending: Open sore to bilat breast around nipples. Pt with hx of DM, FSBS: 126-171 (6/29-6/30), on SSI. Diet=Diabetic. Per ADL, pt eating % of last five documented meals, + snacks. PO intake appears adequate.      RECOMMEND -  1)  Referral to outpatient nutrition services for weight management after D/C.   2) Adjust insulin PRN for tight glucose control.  3) RD to follow for wound care assessment.

## 2019-06-30 NOTE — ASSESSMENT & PLAN NOTE
This is a recurrent and appears to be strong association with a decrease in her steroid taper and she has had this in the past as well.  Cannot do MRI.  CT orbits without inflammatory changes  Dr. Yousif, neuro-opthalmology has been consulted -recommend continue IV Solu-Medrol 1 g IV daily through 7/3/2019 followed by Medrol Dosepak  Additionally, recommended IVIG for 5 days 2 g/kg, which is not available unfortunately  Follow-up with Dr. Carreon he - plan to switch patient to methotrexate at some point  Patient optic neuritis is not formally confirmed and will need outpatient follow-up with neuro-ophthalmology

## 2019-06-30 NOTE — ASSESSMENT & PLAN NOTE
She is on home oxygen between 2 to 4 L 24 hours a day reportedly due to asthma  Respiratory therapy has been consulted for ongoing continuous oxygen  Continue O2 2 L at baseline

## 2019-06-30 NOTE — PROGRESS NOTES
Skin check    Pt presents with open ulcers to L breast, dressing in place.  Two healing ulcers to R breast.  R heel calloused.

## 2019-06-30 NOTE — PROGRESS NOTES
Hospital Medicine Daily Progress Note    Date of Service  6/30/2019    Chief Complaint/Hospital Course  28 y.o. female with history of obesity,  sinus tachycardia status post ablation, spinal cord stimulator for bowel and bladder incontinence, chronic oxygen dependency due to asthma, recurrent optic neuritis, admitted 6/29/2019 with left eye pain, admitted to the hospital for treatment with IV steroid for recurrent optic neuritis      Interval Problem Update  Patient left eye pain is not well controlled with Tylenol.  Ordered oxycodone  Dysuria: Ordered UA and Pyridium  Asthma: No wheezes on exam, on baseline oxygen 2 L      Consultants/Specialty  Neuro ophthalmology    Code Status  Full code    Disposition  In the hospital    Review of Systems  Review of Systems   Constitutional: Negative for chills, fever and weight loss.   HENT: Negative for ear pain, hearing loss and tinnitus.    Eyes: Positive for blurred vision, photophobia and pain. Negative for double vision.   Respiratory: Positive for shortness of breath. Negative for cough, hemoptysis, sputum production and wheezing.    Cardiovascular: Negative for chest pain, palpitations and orthopnea.   Gastrointestinal: Negative for abdominal pain, heartburn, nausea and vomiting.   Genitourinary: Positive for dysuria and hematuria. Negative for frequency and urgency.   Musculoskeletal: Positive for joint pain. Negative for back pain, myalgias and neck pain.   Skin: Negative for itching and rash.   Neurological: Negative for dizziness, tingling, tremors and headaches.   Endo/Heme/Allergies: Negative for environmental allergies and polydipsia. Does not bruise/bleed easily.   Psychiatric/Behavioral: Negative for depression, substance abuse and suicidal ideas.        Physical Exam  Temp:  [36.1 °C (96.9 °F)-36.2 °C (97.2 °F)] 36.1 °C (96.9 °F)  Pulse:  [71-93] 71  Resp:  [16] 16  BP: (115-139)/(57-89) 126/71  SpO2:  [92 %-95 %] 95 %    Physical Exam   Constitutional: She  is oriented to person, place, and time. She appears well-developed.   Obesity   HENT:   Head: Normocephalic and atraumatic.   Eyes: Pupils are equal, round, and reactive to light.   Left eye: Photophobia of the left eye.  Pupil is sluggish to react to light.  Fundus is not well visualized  Painful EOM on the left side   Neck: Normal range of motion. Neck supple.   Cardiovascular: Normal rate and regular rhythm.    Pulmonary/Chest: Effort normal and breath sounds normal. She has no decreased breath sounds. She has no wheezes. She has no rhonchi. She has no rales.   Abdominal: Soft. Bowel sounds are normal.   Musculoskeletal: Normal range of motion.   Neurological: She is alert and oriented to person, place, and time.   Skin: Skin is warm and dry. Rash noted. Rash is maculopapular.   Maculopapular rash on the back   Psychiatric: She has a normal mood and affect.   Nursing note and vitals reviewed.      Fluids    Intake/Output Summary (Last 24 hours) at 06/30/19 1434  Last data filed at 06/29/19 1800   Gross per 24 hour   Intake              800 ml   Output                0 ml   Net              800 ml       Laboratory  Recent Labs      06/29/19   1135   WBC  11.2*   RBC  4.51   HEMOGLOBIN  14.1   HEMATOCRIT  43.0   MCV  95.3   MCH  31.3   MCHC  32.8*   RDW  49.5   PLATELETCT  152*   MPV  10.4     Recent Labs      06/29/19   1135   SODIUM  140   POTASSIUM  4.2   CHLORIDE  104   CO2  22   GLUCOSE  177*   BUN  20   CREATININE  0.81   CALCIUM  9.4                   Imaging  [id number]   Final Result      No periorbital or retro-orbital inflammatory changes are identified.      MRI is suggested for further evaluation of patient's symptoms.      Minimal mucosal thickening in the maxillary sinuses.      Mild asymmetric mucosal thickening along the nasal turbinates on the right.         IR-US GUIDED PIV    (Results Pending)        Assessment/Plan  * Optic neuritis- (present on admission)   Assessment & Plan    This  is a recurrent and appears to be strong association with a decrease in her steroid taper and she has had this in the past as well  Dr. Yousif, neuro-opthalmology has been consulted and recommends 500 mg IV Solu-Medrol daily and he will see her  CT scan of the orbits (as she cannot tolerate an MRI due to her spinal cord stimulator) noted, without inflammatory changes in the periorbital space       Controlled type 2 diabetes mellitus without complication, without long-term current use of insulin ([facility])- (present on admission)   Assessment & Plan    Ms. Balderrama is not on insulin as an outpatient and no medication  Mildly hyperglycemic  As she now is on IV steroids, she will likely require insulin which has been ordered for sliding scale  She may be a good candidate for metformin as an outpatient  A1c 6.1 three months ago     Schizoaffective disorder ([facility])- (present on admission)   Assessment & Plan    She is followed by psychiatry as an outpatient and continue Geodon  Denies suicidal ideations or hallucinations     Dysuria- (present on admission)   Assessment & Plan    Patient has been treated for UTI about 2 weeks ago with Bactrim for 3 days.  Reports dysuria second day after admission  We will check UA  Pyridium for symptoms     On home oxygen therapy- (present on admission)   Assessment & Plan    She is on home oxygen between 2 to 4 L 24 hours a day reportedly due to asthma  Respiratory therapy has been consulted for ongoing continuous oxygen    On 2 L, baseline     Bowel and bladder incontinence- (present on admission)   Assessment & Plan    With a spinal cord stimulator     Arthralgia- (present on admission)   Assessment & Plan    Chronic polyarthralgia.  Continue Voltaren cream     Peripheral neuropathy ([facility])- (present on admission)   Assessment & Plan    She is on Lyrica     H/O prior ablation treatment- (present on admission)   Assessment & Plan    Is followed by Dr. Leon as an outpatient  She will  continue on Corlanr which she takes as an outpatient     Morbid obesity with BMI of 45.0-49.9, adult (HCC)- (present on admission)   Assessment & Plan    BMI is 47  Recommended outpatient weight management          VTE prophylaxis: Heparin

## 2019-06-30 NOTE — ASSESSMENT & PLAN NOTE
Is followed by Dr. Leon as an outpatient  She will continue on Corlanr which she takes as an outpatient

## 2019-06-30 NOTE — H&P
"Hospital Medicine History & Physical Note    Date of Service  6/29/2019    Primary Care Physician  Torres Brody M.D.    Consultants  Dr. Yousif, neuro-opthalmology     Code Status  full    Chief Complaint  Left eye pain    History of Presenting Illness  28 y.o. female who presented 6/29/2019 with left eye pain and vision changes.  Ms. Balderrama has an extremely complex medical history including sinus tachycardia status post ablation, spinal cord stimulator for bowel and bladder incontinence, chronic oxygen dependency due to asthma, and recent admission May 2019 with masturbation of optic neuritis for which she was treated with IV Solu-Medrol.  Previously, when she tapered her Solu-Medrol as well as prednisone she had problems with the left eye with respect to pain and vision.  2 days ago she decreased the prednisone from 20 mg daily to 10 mg daily and almost immediately started to notice \"aching\" in left eye and she developed progressive photophobia with blurry vision and decreased ability to see colors in left eye.  She presented to the emergency room with the symptoms will be treated with IV Solu-Medrol 500 mg daily per recommendations from Dr. Yousif who was called from the ER.   Ms. Balderrama states that she is not on any treatment for her diabetes at home but notes that when she gets on IV Solu-Medrol that her glucose levels tend to escalate substantially.    Review of Systems  Review of Systems   Constitutional: Negative for chills and fever.   Eyes: Positive for blurred vision, photophobia and pain.   Respiratory:        Asthma exacerbation this morning   Cardiovascular: Negative for chest pain.   Genitourinary:        Hematuria for 2 years  He was recently treated for urinary tract infection with Bactrim   Skin:        Chronic ulcerations on her breasts   All other systems reviewed and are negative.      Past Medical History   has a past medical history of Abdominal pain; Anginal syndrome; Apnea, sleep; " Arrhythmia; Arthritis; ASTHMA; Atrial fibrillation ([facility]); Back pain; Borderline personality disorder ([facility]); Breath shortness; Bronchitis; Cardiac arrhythmia; Chickenpox; Chronic UTI (9/18/2010); Cough; Daytime sleepiness; Depression; Diabetes ([facility]); Diarrhea; Disorder of thyroid; Fall; Fatigue; Frequent headaches; Gasping for breath; Gynecological disorder; Headache(784.0); Heart burn; History of falling; Hypertension; Indigestion; Migraine; 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[facility]); 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[facility]); Nausea; Obesity; Pain (08-15-12); Painful joint; PCOS (polycystic ovarian syndrome); Pneumonia (2012); Psychosis ([facility]); Renal disorder; Ringing in ears; Scoliosis; Shortness of breath; Sinus tachycardia (10/31/2013); Sleep apnea; Snoring; Tonsillitis; Tuberculosis; Urinary bladder disorder; Urinary incontinence; Weakness; and Wears glasses.    Surgical History   has a past surgical history that includes neuro dest facet l/s w/ig sngl (4/21/2015); recovery (1/27/2016); delmar by laparoscopy (8/29/2010); lumbar fusion anterior (8/21/2012); other cardiac surgery (1/2016); tonsillectomy; bowel stimulator insertion (7/13/2016); gastroscopy with balloon dilatation (N/A, 1/4/2017); muscle biopsy (Right, 1/26/2017); other abdominal surgery; and laminotomy.     Family History  family history includes Genitourinary () in her sister; Heart Disease in her maternal grandmother and mother; Hypertension in her maternal grandmother, maternal uncle, and mother; No Known Problems in her sister; Other in her mother and sister; Sleep Apnea in her mother.     Social History   reports that she has never smoked. She has never used smokeless tobacco. She reports that she uses drugs, including Marijuana and Oral, about 7 times per week. She reports that she does not drink alcohol.    Allergies  Allergies   Allergen Reactions   • Cefdinir Shortness of Breath and Itching     Tolerated 1/18/17  Tolerates ceftriaxone    • Depakote  "[Divalproex Sodium] Unspecified     Muscle spasms/muscle pain and weakness     • Doxycycline Anaphylaxis and Vomiting     RXN=unknown   • Abilify Unspecified     Headaches/muscle twitching     • Amitriptyline Unspecified     Headaches     • Amoxicillin Rash     Pt states \"I get a rash\".     • Ciprofloxacin Rash     Pt states \"I get a rash\".     • Clindamycin Nausea     Even with food     • Ees [Erythromycin] Vomiting and Nausea   • Flagyl [Metronidazole Hcl] Unspecified     \"eye problems\"     • Flomax [Tamsulosin Hydrochloride] Swelling   • Metformin Unspecified     Increased lactic acid      • Tape Rash     Tears skin off  coban with Tegaderm tape ok intermittently  RXN=ongoing   • Vancomycin Itching     Pt becomes flushed in face and chest.   RXN=7/10/16   • Wound Dressing Adhesive Hives     By pt report   • Cephalexin [Keflex] Rash     Pt states she gets a rash with this medication  Tolerates ceftriaxone   • Levofloxacin Unspecified     Leg muscle cramps   • Metronidazole Rash     .   • Valproic Acid Rash     .       Medications  Prior to Admission Medications   Prescriptions Last Dose Informant Patient Reported? Taking?   Diclofenac Sodium (VOLTAREN) 1 % Gel 6/28/2019 at 1000 Patient No No   Sig: Apply 1 Inch to skin as directed 4 times a day.   Diphenhydramine-APAP, sleep, (TYLENOL PM EXTRA STRENGTH PO) 6/28/2019 at 1400 Patient Yes No   Sig: Take 1 Cap by mouth every day.   FLUoxetine (PROZAC) 10 MG Cap 6/28/2019 at 2100 Patient No No   Sig: TAKE ONE CAPSULE BY MOUTH ONCE A DAY   LYRICA 300 MG capsule 6/28/2019 at 2100 Patient Yes No   Sig: Take 300 mg by mouth 2 times a day.   Melatonin 5 MG Tab 6/28/2019 at 1000 Patient Yes Yes   Sig: Take 10 mg by mouth every evening.   albuterol 108 (90 Base) MCG/ACT Aero Soln inhalation aerosol 6/29/2019 at 0700 Patient Yes Yes   Sig: Inhale 2 Puffs by mouth every 6 hours as needed for Shortness of Breath.   aspirin EC (ECOTRIN) 81 MG Tablet Delayed Response 6/28/2019 at " 2100 Patient No No   Sig: Take 1 Tab by mouth every day.   busPIRone (BUSPAR) 5 MG tablet 6/28/2019 at 2100 Patient No No   Sig: TAKE ONE TABLET BY MOUTH TWICE DAILY   furosemide (LASIX) 80 MG Tab 6/28/2019 at AM Patient Yes Yes   Sig: Take 80 mg by mouth every day.   ivabradine (CORLANOR) 5 MG Tab tablet 6/28/2019 at 2100 Patient Yes Yes   Sig: Take 5 mg by mouth 2 times a day, with meals.   lactulose 10 GM/15ML Solution UNK at UNK Patient Yes Yes   Sig: Take 10 g by mouth 2 times a day as needed.   methocarbamol (ROBAXIN) 750 MG Tab 6/28/2019 at 2100 Patient Yes No   Sig: Take 750 mg by mouth 3 times a day.   norethindrone (DEBLITANE) 0.35 MG tablet 6/28/2019 at 2100 Patient Yes No   Sig: Take 0.35 mg by mouth every day.   phenazopyridine (PYRIDIUM) 200 MG Tab 6/28/2019 at PRN Patient No No   Sig: Take 1 Tab by mouth 3 times a day as needed.   predniSONE (DELTASONE) 10 MG Tab 6/29/2019 at 0700 Patient Yes No   Sig: Take 10 mg by mouth every day.   sodium bicarbonate 325 MG Tab 6/28/2019 at 2100 Patient Yes Yes   Sig: Take 325-650 mg by mouth 3 times a day. 650 mg in AM  325 mg mid-afternoon  650 mg at night   sulfamethoxazole-trimethoprim (BACTRIM DS) 800-160 MG tablet 6/14/2019 at DONE  Yes Yes   Sig: Take 1 Tab by mouth 2 times a day. 3 DAYS   traZODone (DESYREL) 100 MG Tab 6/28/2019 at 1000 Patient No No   Sig: TAKE  ONE TABLET BY MOUTH NIGHTLY AT BEDTIME   vitamin D, Ergocalciferol, (DRISDOL) 76066 units Cap capsule 6/28/2019 at AM Patient Yes No   Sig: Take 50,000 Units by mouth every Friday.   ziprasidone (GEODON) 80 MG Cap 6/28/2019 at 2100 Patient Yes No   Sig: Take 80 mg by mouth 2 Times a Day.      Facility-Administered Medications: None       Physical Exam  Temp:  [36.3 °C (97.4 °F)-36.4 °C (97.5 °F)] 36.4 °C (97.5 °F)  Pulse:  [] 88  Resp:  [16-20] 16  BP: (135-145)/(75-87) 144/82  SpO2:  [92 %-95 %] 95 %    Physical Exam   Constitutional: She is oriented to person, place, and time. No distress.    HENT:   Moist mucous membranes no facial droop   Eyes:   Left pupil sluggish though reactive to light  She seems to build to move it reasonably well vertically and laterally though limited by pain  No significant injection of the eye   Neck: Normal range of motion. Neck supple.   Cardiovascular: Normal rate and regular rhythm.    No murmur heard.  Pulmonary/Chest:   Breathing, good air movement, no wheezing nor rhonchi   Abdominal: Soft. She exhibits no distension. There is no tenderness.   Musculoskeletal: She exhibits edema. She exhibits no tenderness.   Neurological: She is alert and oriented to person, place, and time.   Skin: Skin is warm and dry. No rash noted. She is not diaphoretic. There is pallor.   Psychiatric: She has a normal mood and affect. Her behavior is normal.   Nursing note and vitals reviewed.      Laboratory:  Recent Labs      06/29/19   1135   WBC  11.2*   RBC  4.51   HEMOGLOBIN  14.1   HEMATOCRIT  43.0   MCV  95.3   MCH  31.3   MCHC  32.8*   RDW  49.5   PLATELETCT  152*   MPV  10.4     Recent Labs      06/29/19   1135   SODIUM  140   POTASSIUM  4.2   CHLORIDE  104   CO2  22   GLUCOSE  177*   BUN  20   CREATININE  0.81   CALCIUM  9.4     Recent Labs      06/29/19   1135   GLUCOSE  177*                 No results for input(s): TROPONINI in the last 72 hours.    Urinalysis:    No results found     Imaging:  [id number] WITH   Final Result      No periorbital or retro-orbital inflammatory changes are identified.      MRI is suggested for further evaluation of patient's symptoms.      Minimal mucosal thickening in the maxillary sinuses.      Mild asymmetric mucosal thickening along the nasal turbinates on the right.               Assessment/Plan:  I anticipate this patient will require at least two midnights for appropriate medical management, necessitating inpatient admission.    * Optic neuritis- (present on admission)   Assessment & Plan    This is a recurrent and appears to be strong  association with a decrease in her steroid taper and she has had this in the past as well  Dr. Yousif, neuro-opthalmology has been consulted and recommends 500 mg IV Solu-Medrol daily and he will see her  He is recommended a CT scan of the orbits as she cannot tolerate an MRI due to her spinal cord stimulator  She will be admitted to the neurology floor for close neurologic checks and IV steroids     Controlled type 2 diabetes mellitus without complication, without long-term current use of insulin ([facility])- (present on admission)   Assessment & Plan    Ms. Balderrama is not on insulin as an outpatient and no medication  She is hyperglycemic upon presentation with a glucose of 177  As she now is on IV steroids, she will likely require insulin which has been ordered for sliding scale  She may be a good candidate for metformin as an outpatient     Schizoaffective disorder ([facility])- (present on admission)   Assessment & Plan    She is followed by psychiatry as an outpatient and continue Geodon     On home oxygen therapy- (present on admission)   Assessment & Plan    She is on home oxygen between 2 to 4 L 24 hours a day reportedly due to asthma  Respiratory therapy has been consulted for ongoing continuous oxygen     Bowel and bladder incontinence- (present on admission)   Assessment & Plan    With a spinal cord stimulator     Peripheral neuropathy ([facility])- (present on admission)   Assessment & Plan    She is on Lyrica     H/O prior ablation treatment- (present on admission)   Assessment & Plan    Is followed by Dr. Leon as an outpatient  She will continue on Corlanr which she takes as an outpatient     Morbid obesity with BMI of 45.0-49.9, adult ([facility])- (present on admission)   Assessment & Plan    BMI is 47         VTE prophylaxis: Lovenox

## 2019-06-30 NOTE — PROGRESS NOTES
Pt is AOx4, on 2.5L continuously, wears O2 at home. Tolerating diabetic diet. Ambulates with walker from home and SBA, gait steady, slight limp to R ankle from previous injury. Orthopedic shoe stored in closet. Medicated for pain per MAR. All fall precautions in place.

## 2019-07-01 ENCOUNTER — APPOINTMENT (OUTPATIENT)
Dept: RADIOLOGY | Facility: MEDICAL CENTER | Age: 29
DRG: 123 | End: 2019-07-01
Attending: INTERNAL MEDICINE
Payer: MEDICARE

## 2019-07-01 ENCOUNTER — APPOINTMENT (OUTPATIENT)
Dept: PHYSICAL THERAPY | Facility: REHABILITATION | Age: 29
End: 2019-07-01
Attending: INTERNAL MEDICINE
Payer: MEDICARE

## 2019-07-01 ENCOUNTER — APPOINTMENT (OUTPATIENT)
Dept: RADIOLOGY | Facility: MEDICAL CENTER | Age: 29
End: 2019-07-01
Attending: NURSE PRACTITIONER
Payer: MEDICARE

## 2019-07-01 LAB
ANION GAP SERPL CALC-SCNC: 10 MMOL/L (ref 0–11.9)
BASOPHILS # BLD AUTO: 0.1 % (ref 0–1.8)
BASOPHILS # BLD: 0.01 K/UL (ref 0–0.12)
BUN SERPL-MCNC: 20 MG/DL (ref 8–22)
CALCIUM SERPL-MCNC: 9 MG/DL (ref 8.5–10.5)
CHLORIDE SERPL-SCNC: 101 MMOL/L (ref 96–112)
CO2 SERPL-SCNC: 25 MMOL/L (ref 20–33)
CREAT SERPL-MCNC: 0.67 MG/DL (ref 0.5–1.4)
D DIMER PPP IA.FEU-MCNC: <0.4 UG/ML (FEU) (ref 0–0.5)
EKG IMPRESSION: NORMAL
EOSINOPHIL # BLD AUTO: 0 K/UL (ref 0–0.51)
EOSINOPHIL NFR BLD: 0 % (ref 0–6.9)
ERYTHROCYTE [DISTWIDTH] IN BLOOD BY AUTOMATED COUNT: 48.3 FL (ref 35.9–50)
GLUCOSE BLD-MCNC: 108 MG/DL (ref 65–99)
GLUCOSE BLD-MCNC: 140 MG/DL (ref 65–99)
GLUCOSE BLD-MCNC: 142 MG/DL (ref 65–99)
GLUCOSE BLD-MCNC: 167 MG/DL (ref 65–99)
GLUCOSE BLD-MCNC: 218 MG/DL (ref 65–99)
GLUCOSE SERPL-MCNC: 141 MG/DL (ref 65–99)
HCT VFR BLD AUTO: 39.3 % (ref 37–47)
HGB BLD-MCNC: 13.1 G/DL (ref 12–16)
IMM GRANULOCYTES # BLD AUTO: 0.11 K/UL (ref 0–0.11)
IMM GRANULOCYTES NFR BLD AUTO: 0.9 % (ref 0–0.9)
LYMPHOCYTES # BLD AUTO: 1.15 K/UL (ref 1–4.8)
LYMPHOCYTES NFR BLD: 9.3 % (ref 22–41)
MCH RBC QN AUTO: 31.2 PG (ref 27–33)
MCHC RBC AUTO-ENTMCNC: 33.3 G/DL (ref 33.6–35)
MCV RBC AUTO: 93.6 FL (ref 81.4–97.8)
MONOCYTES # BLD AUTO: 0.8 K/UL (ref 0–0.85)
MONOCYTES NFR BLD AUTO: 6.5 % (ref 0–13.4)
NEUTROPHILS # BLD AUTO: 10.23 K/UL (ref 2–7.15)
NEUTROPHILS NFR BLD: 83.2 % (ref 44–72)
NRBC # BLD AUTO: 0 K/UL
NRBC BLD-RTO: 0 /100 WBC
PLATELET # BLD AUTO: 192 K/UL (ref 164–446)
PMV BLD AUTO: 11.1 FL (ref 9–12.9)
POTASSIUM SERPL-SCNC: 4.1 MMOL/L (ref 3.6–5.5)
RBC # BLD AUTO: 4.2 M/UL (ref 4.2–5.4)
SODIUM SERPL-SCNC: 136 MMOL/L (ref 135–145)
TROPONIN I SERPL-MCNC: <0.01 NG/ML (ref 0–0.04)
WBC # BLD AUTO: 12.3 K/UL (ref 4.8–10.8)

## 2019-07-01 PROCEDURE — A9270 NON-COVERED ITEM OR SERVICE: HCPCS | Performed by: PSYCHIATRY & NEUROLOGY

## 2019-07-01 PROCEDURE — 99233 SBSQ HOSP IP/OBS HIGH 50: CPT | Performed by: INTERNAL MEDICINE

## 2019-07-01 PROCEDURE — 82962 GLUCOSE BLOOD TEST: CPT | Mod: 91

## 2019-07-01 PROCEDURE — A9270 NON-COVERED ITEM OR SERVICE: HCPCS | Performed by: HOSPITALIST

## 2019-07-01 PROCEDURE — 700105 HCHG RX REV CODE 258: Performed by: HOSPITALIST

## 2019-07-01 PROCEDURE — 80048 BASIC METABOLIC PNL TOTAL CA: CPT

## 2019-07-01 PROCEDURE — 700105 HCHG RX REV CODE 258: Performed by: INTERNAL MEDICINE

## 2019-07-01 PROCEDURE — 85379 FIBRIN DEGRADATION QUANT: CPT

## 2019-07-01 PROCEDURE — 700102 HCHG RX REV CODE 250 W/ 637 OVERRIDE(OP): Performed by: HOSPITALIST

## 2019-07-01 PROCEDURE — 85025 COMPLETE CBC W/AUTO DIFF WBC: CPT

## 2019-07-01 PROCEDURE — 93010 ELECTROCARDIOGRAM REPORT: CPT | Performed by: INTERNAL MEDICINE

## 2019-07-01 PROCEDURE — 36415 COLL VENOUS BLD VENIPUNCTURE: CPT

## 2019-07-01 PROCEDURE — 700102 HCHG RX REV CODE 250 W/ 637 OVERRIDE(OP): Performed by: INTERNAL MEDICINE

## 2019-07-01 PROCEDURE — 84484 ASSAY OF TROPONIN QUANT: CPT

## 2019-07-01 PROCEDURE — 700102 HCHG RX REV CODE 250 W/ 637 OVERRIDE(OP): Performed by: PSYCHIATRY & NEUROLOGY

## 2019-07-01 PROCEDURE — 93005 ELECTROCARDIOGRAM TRACING: CPT | Performed by: INTERNAL MEDICINE

## 2019-07-01 PROCEDURE — 770006 HCHG ROOM/CARE - MED/SURG/GYN SEMI*

## 2019-07-01 PROCEDURE — 700111 HCHG RX REV CODE 636 W/ 250 OVERRIDE (IP): Performed by: HOSPITALIST

## 2019-07-01 PROCEDURE — 700111 HCHG RX REV CODE 636 W/ 250 OVERRIDE (IP): Performed by: INTERNAL MEDICINE

## 2019-07-01 PROCEDURE — 700111 HCHG RX REV CODE 636 W/ 250 OVERRIDE (IP): Performed by: PSYCHIATRY & NEUROLOGY

## 2019-07-01 PROCEDURE — A9270 NON-COVERED ITEM OR SERVICE: HCPCS | Performed by: INTERNAL MEDICINE

## 2019-07-01 RX ORDER — OXYCODONE HYDROCHLORIDE 5 MG/1
5 TABLET ORAL EVERY 6 HOURS PRN
Status: DISCONTINUED | OUTPATIENT
Start: 2019-07-01 | End: 2019-07-03 | Stop reason: HOSPADM

## 2019-07-01 RX ORDER — FOLIC ACID 1 MG/1
1 TABLET ORAL DAILY
Status: DISCONTINUED | OUTPATIENT
Start: 2019-07-01 | End: 2019-07-03 | Stop reason: HOSPADM

## 2019-07-01 RX ORDER — ONDANSETRON 2 MG/ML
4 INJECTION INTRAMUSCULAR; INTRAVENOUS EVERY 4 HOURS PRN
Status: DISCONTINUED | OUTPATIENT
Start: 2019-07-01 | End: 2019-07-03 | Stop reason: HOSPADM

## 2019-07-01 RX ORDER — POLYETHYLENE GLYCOL 3350 17 G/17G
1 POWDER, FOR SOLUTION ORAL DAILY
Status: DISCONTINUED | OUTPATIENT
Start: 2019-07-01 | End: 2019-07-03 | Stop reason: HOSPADM

## 2019-07-01 RX ORDER — LORATADINE 10 MG/1
10 TABLET ORAL DAILY
Status: DISCONTINUED | OUTPATIENT
Start: 2019-07-01 | End: 2019-07-03 | Stop reason: HOSPADM

## 2019-07-01 RX ORDER — FOLIC ACID 1 MG/1
1 TABLET ORAL DAILY
Status: DISCONTINUED | OUTPATIENT
Start: 2019-07-02 | End: 2019-07-01

## 2019-07-01 RX ADMIN — HYDROCORTISONE ACETATE 25 MG: 25 SUPPOSITORY RECTAL at 04:28

## 2019-07-01 RX ADMIN — SODIUM CHLORIDE 500 MG: 9 INJECTION, SOLUTION INTRAVENOUS at 06:02

## 2019-07-01 RX ADMIN — ENOXAPARIN SODIUM 40 MG: 100 INJECTION SUBCUTANEOUS at 04:30

## 2019-07-01 RX ADMIN — SODIUM BICARBONATE 650 MG: 650 TABLET ORAL at 04:28

## 2019-07-01 RX ADMIN — LORATADINE 10 MG: 10 TABLET ORAL at 17:41

## 2019-07-01 RX ADMIN — HYDROCORTISONE ACETATE 25 MG: 25 SUPPOSITORY RECTAL at 17:20

## 2019-07-01 RX ADMIN — FOLIC ACID 1 MG: 1 TABLET ORAL at 21:05

## 2019-07-01 RX ADMIN — PHENAZOPYRIDINE HYDROCHLORIDE 100 MG: 100 TABLET ORAL at 07:40

## 2019-07-01 RX ADMIN — TRAZODONE HYDROCHLORIDE 100 MG: 100 TABLET ORAL at 21:05

## 2019-07-01 RX ADMIN — METHOTREXATE 5 MG: 2.5 TABLET ORAL at 21:43

## 2019-07-01 RX ADMIN — OXYCODONE HYDROCHLORIDE 5 MG: 5 TABLET ORAL at 14:38

## 2019-07-01 RX ADMIN — METHOCARBAMOL TABLETS 750 MG: 750 TABLET, COATED ORAL at 11:45

## 2019-07-01 RX ADMIN — SENNOSIDES, DOCUSATE SODIUM 2 TABLET: 50; 8.6 TABLET, FILM COATED ORAL at 04:29

## 2019-07-01 RX ADMIN — PREGABALIN 300 MG: 100 CAPSULE ORAL at 04:27

## 2019-07-01 RX ADMIN — ONDANSETRON 4 MG: 2 INJECTION INTRAMUSCULAR; INTRAVENOUS at 16:17

## 2019-07-01 RX ADMIN — CIPROFLOXACIN HYDROCHLORIDE 5 DROP: 3.5 SOLUTION/ DROPS TOPICAL at 04:27

## 2019-07-01 RX ADMIN — FUROSEMIDE 80 MG: 40 TABLET ORAL at 06:00

## 2019-07-01 RX ADMIN — CIPROFLOXACIN HYDROCHLORIDE 5 DROP: 3.5 SOLUTION/ DROPS TOPICAL at 18:00

## 2019-07-01 RX ADMIN — INSULIN HUMAN 4 UNITS: 100 INJECTION, SOLUTION PARENTERAL at 21:03

## 2019-07-01 RX ADMIN — BUSPIRONE HYDROCHLORIDE 5 MG: 10 TABLET ORAL at 17:19

## 2019-07-01 RX ADMIN — SODIUM CHLORIDE 500 MG: 9 INJECTION, SOLUTION INTRAVENOUS at 12:45

## 2019-07-01 RX ADMIN — SODIUM BICARBONATE 650 MG: 650 TABLET ORAL at 17:19

## 2019-07-01 RX ADMIN — OXYCODONE HYDROCHLORIDE 5 MG: 5 TABLET ORAL at 04:41

## 2019-07-01 RX ADMIN — OXYCODONE HYDROCHLORIDE 5 MG: 5 TABLET ORAL at 09:08

## 2019-07-01 RX ADMIN — PHENAZOPYRIDINE HYDROCHLORIDE 100 MG: 100 TABLET ORAL at 17:19

## 2019-07-01 RX ADMIN — ZIPRASIDONE HYDROCHLORIDE 80 MG: 80 CAPSULE ORAL at 04:28

## 2019-07-01 RX ADMIN — ASPIRIN 81 MG: 81 TABLET, COATED ORAL at 04:29

## 2019-07-01 RX ADMIN — BUSPIRONE HYDROCHLORIDE 5 MG: 10 TABLET ORAL at 04:28

## 2019-07-01 RX ADMIN — METHOCARBAMOL TABLETS 750 MG: 750 TABLET, COATED ORAL at 17:19

## 2019-07-01 RX ADMIN — FLUOXETINE HYDROCHLORIDE 20 MG: 20 CAPSULE ORAL at 06:00

## 2019-07-01 RX ADMIN — POLYETHYLENE GLYCOL 3350 1 PACKET: 17 POWDER, FOR SOLUTION ORAL at 11:45

## 2019-07-01 RX ADMIN — ALBUTEROL SULFATE 2 PUFF: 90 AEROSOL, METERED RESPIRATORY (INHALATION) at 06:36

## 2019-07-01 RX ADMIN — ZIPRASIDONE HYDROCHLORIDE 80 MG: 80 CAPSULE ORAL at 17:20

## 2019-07-01 RX ADMIN — SODIUM BICARBONATE 325 MG: 650 TABLET ORAL at 14:39

## 2019-07-01 RX ADMIN — PHENAZOPYRIDINE HYDROCHLORIDE 100 MG: 100 TABLET ORAL at 11:44

## 2019-07-01 RX ADMIN — PREGABALIN 300 MG: 100 CAPSULE ORAL at 17:20

## 2019-07-01 RX ADMIN — METHOCARBAMOL TABLETS 750 MG: 750 TABLET, COATED ORAL at 04:30

## 2019-07-01 ASSESSMENT — ENCOUNTER SYMPTOMS
COUGH: 0
POLYDIPSIA: 0
BLURRED VISION: 1
NAUSEA: 0
EYE PAIN: 1
HEARTBURN: 0
DEPRESSION: 0
DOUBLE VISION: 0
BRUISES/BLEEDS EASILY: 0
VOMITING: 0
HEADACHES: 0
ORTHOPNEA: 0
DIZZINESS: 0
FEVER: 0
TINGLING: 0
NECK PAIN: 0
TREMORS: 0
SHORTNESS OF BREATH: 1
ABDOMINAL PAIN: 0
PHOTOPHOBIA: 1
WEIGHT LOSS: 0
WHEEZING: 0
HEMOPTYSIS: 0
MYALGIAS: 0
PALPITATIONS: 0
SPUTUM PRODUCTION: 0
CHILLS: 0
BACK PAIN: 0

## 2019-07-01 ASSESSMENT — LIFESTYLE VARIABLES: SUBSTANCE_ABUSE: 0

## 2019-07-01 NOTE — PROGRESS NOTES
I was called to bedside as Ms. Balderrama complained of chest pain. She states that she has events like this occasionally. She is not tachycardic.   Given her age, we will monitor her condition closely and not escalate care at this time.

## 2019-07-02 ENCOUNTER — APPOINTMENT (OUTPATIENT)
Dept: PHYSICAL THERAPY | Facility: REHABILITATION | Age: 29
End: 2019-07-02
Attending: INTERNAL MEDICINE
Payer: MEDICARE

## 2019-07-02 LAB
GLUCOSE BLD-MCNC: 135 MG/DL (ref 65–99)
GLUCOSE BLD-MCNC: 146 MG/DL (ref 65–99)
GLUCOSE BLD-MCNC: 202 MG/DL (ref 65–99)

## 2019-07-02 PROCEDURE — 700111 HCHG RX REV CODE 636 W/ 250 OVERRIDE (IP): Performed by: INTERNAL MEDICINE

## 2019-07-02 PROCEDURE — A9270 NON-COVERED ITEM OR SERVICE: HCPCS | Performed by: HOSPITALIST

## 2019-07-02 PROCEDURE — A9270 NON-COVERED ITEM OR SERVICE: HCPCS | Performed by: INTERNAL MEDICINE

## 2019-07-02 PROCEDURE — 700102 HCHG RX REV CODE 250 W/ 637 OVERRIDE(OP): Performed by: INTERNAL MEDICINE

## 2019-07-02 PROCEDURE — 82962 GLUCOSE BLOOD TEST: CPT | Mod: 91

## 2019-07-02 PROCEDURE — 700102 HCHG RX REV CODE 250 W/ 637 OVERRIDE(OP): Performed by: HOSPITALIST

## 2019-07-02 PROCEDURE — 99232 SBSQ HOSP IP/OBS MODERATE 35: CPT | Performed by: HOSPITALIST

## 2019-07-02 PROCEDURE — 700105 HCHG RX REV CODE 258: Performed by: INTERNAL MEDICINE

## 2019-07-02 PROCEDURE — 700111 HCHG RX REV CODE 636 W/ 250 OVERRIDE (IP): Performed by: HOSPITALIST

## 2019-07-02 PROCEDURE — 770006 HCHG ROOM/CARE - MED/SURG/GYN SEMI*

## 2019-07-02 RX ADMIN — CIPROFLOXACIN HYDROCHLORIDE 5 DROP: 3.5 SOLUTION/ DROPS TOPICAL at 18:00

## 2019-07-02 RX ADMIN — ONDANSETRON 4 MG: 2 INJECTION INTRAMUSCULAR; INTRAVENOUS at 22:31

## 2019-07-02 RX ADMIN — SENNOSIDES, DOCUSATE SODIUM 2 TABLET: 50; 8.6 TABLET, FILM COATED ORAL at 06:15

## 2019-07-02 RX ADMIN — ASPIRIN 81 MG: 81 TABLET, COATED ORAL at 06:16

## 2019-07-02 RX ADMIN — HYDROCORTISONE ACETATE 25 MG: 25 SUPPOSITORY RECTAL at 06:16

## 2019-07-02 RX ADMIN — HYDROCORTISONE ACETATE 25 MG: 25 SUPPOSITORY RECTAL at 16:39

## 2019-07-02 RX ADMIN — POLYETHYLENE GLYCOL 3350 1 PACKET: 17 POWDER, FOR SOLUTION ORAL at 06:21

## 2019-07-02 RX ADMIN — ZIPRASIDONE HYDROCHLORIDE 80 MG: 80 CAPSULE ORAL at 16:39

## 2019-07-02 RX ADMIN — ZIPRASIDONE HYDROCHLORIDE 80 MG: 80 CAPSULE ORAL at 06:14

## 2019-07-02 RX ADMIN — FUROSEMIDE 80 MG: 40 TABLET ORAL at 06:15

## 2019-07-02 RX ADMIN — ENOXAPARIN SODIUM 40 MG: 100 INJECTION SUBCUTANEOUS at 06:13

## 2019-07-02 RX ADMIN — BUSPIRONE HYDROCHLORIDE 5 MG: 10 TABLET ORAL at 16:36

## 2019-07-02 RX ADMIN — ONDANSETRON 4 MG: 2 INJECTION INTRAMUSCULAR; INTRAVENOUS at 14:17

## 2019-07-02 RX ADMIN — SENNOSIDES, DOCUSATE SODIUM 2 TABLET: 50; 8.6 TABLET, FILM COATED ORAL at 16:38

## 2019-07-02 RX ADMIN — SODIUM BICARBONATE 650 MG: 650 TABLET ORAL at 16:38

## 2019-07-02 RX ADMIN — METHOCARBAMOL TABLETS 750 MG: 750 TABLET, COATED ORAL at 10:55

## 2019-07-02 RX ADMIN — OXYCODONE HYDROCHLORIDE 5 MG: 5 TABLET ORAL at 21:34

## 2019-07-02 RX ADMIN — OXYCODONE HYDROCHLORIDE 5 MG: 5 TABLET ORAL at 01:48

## 2019-07-02 RX ADMIN — LORATADINE 10 MG: 10 TABLET ORAL at 06:15

## 2019-07-02 RX ADMIN — TRAZODONE HYDROCHLORIDE 100 MG: 100 TABLET ORAL at 21:29

## 2019-07-02 RX ADMIN — METHOCARBAMOL TABLETS 750 MG: 750 TABLET, COATED ORAL at 16:38

## 2019-07-02 RX ADMIN — ONDANSETRON 4 MG: 2 INJECTION INTRAMUSCULAR; INTRAVENOUS at 09:13

## 2019-07-02 RX ADMIN — PREGABALIN 300 MG: 100 CAPSULE ORAL at 06:14

## 2019-07-02 RX ADMIN — OXYCODONE HYDROCHLORIDE 5 MG: 5 TABLET ORAL at 06:17

## 2019-07-02 RX ADMIN — PHENAZOPYRIDINE HYDROCHLORIDE 100 MG: 100 TABLET ORAL at 07:37

## 2019-07-02 RX ADMIN — SODIUM CHLORIDE 1000 MG: 9 INJECTION, SOLUTION INTRAVENOUS at 06:21

## 2019-07-02 RX ADMIN — FLUOXETINE HYDROCHLORIDE 20 MG: 20 CAPSULE ORAL at 06:14

## 2019-07-02 RX ADMIN — INSULIN HUMAN 4 UNITS: 100 INJECTION, SOLUTION PARENTERAL at 16:53

## 2019-07-02 RX ADMIN — ONDANSETRON 4 MG: 2 INJECTION INTRAMUSCULAR; INTRAVENOUS at 02:21

## 2019-07-02 RX ADMIN — METHOCARBAMOL TABLETS 750 MG: 750 TABLET, COATED ORAL at 06:16

## 2019-07-02 RX ADMIN — OXYCODONE HYDROCHLORIDE 5 MG: 5 TABLET ORAL at 14:17

## 2019-07-02 RX ADMIN — PREGABALIN 300 MG: 100 CAPSULE ORAL at 16:37

## 2019-07-02 RX ADMIN — BUSPIRONE HYDROCHLORIDE 5 MG: 10 TABLET ORAL at 06:15

## 2019-07-02 RX ADMIN — SODIUM BICARBONATE 325 MG: 650 TABLET ORAL at 15:11

## 2019-07-02 RX ADMIN — SODIUM BICARBONATE 650 MG: 650 TABLET ORAL at 06:15

## 2019-07-02 RX ADMIN — CIPROFLOXACIN HYDROCHLORIDE 5 DROP: 3.5 SOLUTION/ DROPS TOPICAL at 06:28

## 2019-07-02 ASSESSMENT — ENCOUNTER SYMPTOMS
WHEEZING: 0
SHORTNESS OF BREATH: 0
FLANK PAIN: 0
STRIDOR: 0
EYE PAIN: 1
TREMORS: 0
PALPITATIONS: 0
DIARRHEA: 0
FOCAL WEAKNESS: 0
COUGH: 0
WEAKNESS: 0
HALLUCINATIONS: 0
SPEECH CHANGE: 0
BLURRED VISION: 1
NECK PAIN: 0
SENSORY CHANGE: 0
BACK PAIN: 0
DIAPHORESIS: 0
VOMITING: 0
ABDOMINAL PAIN: 0
FEVER: 0
CHILLS: 0
EYE REDNESS: 0
EYE DISCHARGE: 0

## 2019-07-02 ASSESSMENT — LIFESTYLE VARIABLES: SUBSTANCE_ABUSE: 0

## 2019-07-02 NOTE — ASSESSMENT & PLAN NOTE
Patient is known to have recurrent episodes of chest pain.  She is on Ivabradin for heart rate related chest pain and following with cardiology.  Earlier symptoms atypical-no new symptoms.  EKG did not reveal acute ischemia.  Troponin and d-dimer are not elevated  Follow-up with cardiology as outpatient

## 2019-07-02 NOTE — ASSESSMENT & PLAN NOTE
Maculopapular rash on the back, associated with this pruritus-possible hypersensitivity, heat rash  Unclear etiology  Continue with Claritin Claritin  Patient is getting steroids IV  Monitor clinical response

## 2019-07-02 NOTE — PROGRESS NOTES
Hospital Medicine Daily Progress Note    Date of Service  7/1/2019    Chief Complaint/Hospital Course  28 y.o. female with history of obesity,  sinus tachycardia status post ablation, spinal cord stimulator for bowel and bladder incontinence, chronic oxygen dependency due to asthma, recurrent optic neuritis, admitted 6/29/2019 with left eye pain, admitted to the hospital for treatment with IV steroid for recurrent optic neuritis      Interval Problem Update    Patient stated oxycodone has been helping for left eye pain, however, the left eye pain is still there.  Last night she had 4-hour long episode of sharp left-sided chest pain without alleviating or aggravating factors.  This morning EKG and troponin level unremarkable per my review.  D-dimer is also not elevated  White blood cell slightly elevated.  I contacted  and discussed patient's case and points of care.  I faxed  result of  CAT scan of the orbit and HP note to his office per his request.  He recommended to continue IV methylprednisone at current dose for 4 days total.  Additionally he recommended IVIG, which is turned out to be unavailable due to national wide shortage.  Therefore, I increased dose of Solu-Medrol to 1000 mg once a day  Patient has been complaining of some nausea and Zofran as needed ordered  Patient states that her rash on the skin of the back started itching    I spent a total of 45 minutes of non face to face time performing additional research for this condition optic neuritis includes an latest guideline for treatment, reviewing medical records, discussion with  of points of care and plan of care. start time: 10:00. End time: 10:45      Consultants/Specialty  Neuro ophthalmology    Code Status  Full code    Disposition  In the hospital    Review of Systems  Review of Systems   Constitutional: Negative for chills, fever and weight loss.   HENT: Negative for ear pain, hearing loss and tinnitus.    Eyes: Positive  for blurred vision, photophobia and pain. Negative for double vision.   Respiratory: Positive for shortness of breath. Negative for cough, hemoptysis, sputum production and wheezing.    Cardiovascular: Negative for chest pain, palpitations and orthopnea.   Gastrointestinal: Negative for abdominal pain, heartburn, nausea and vomiting.   Genitourinary: Positive for dysuria and hematuria. Negative for frequency and urgency.   Musculoskeletal: Positive for joint pain. Negative for back pain, myalgias and neck pain.   Skin: Negative for itching and rash.   Neurological: Negative for dizziness, tingling, tremors and headaches.   Endo/Heme/Allergies: Negative for environmental allergies and polydipsia. Does not bruise/bleed easily.   Psychiatric/Behavioral: Negative for depression, substance abuse and suicidal ideas.        Physical Exam  Temp:  [36.4 °C (97.5 °F)-36.7 °C (98.1 °F)] 36.4 °C (97.5 °F)  Pulse:  [71-72] 72  Resp:  [16-17] 16  BP: (120-131)/(67-78) 129/67  SpO2:  [92 %-93 %] 92 %    Physical Exam   Constitutional: She is oriented to person, place, and time. She appears well-developed.   Obesity   HENT:   Head: Normocephalic and atraumatic.   Eyes: Pupils are equal, round, and reactive to light.   Left eye: Photophobia of the left eye.  Pupil is sluggish to react to light.  Fundus is not well visualized  Painful EOM on the left side   Neck: Normal range of motion. Neck supple.   Cardiovascular: Normal rate and regular rhythm.    Pulmonary/Chest: Effort normal and breath sounds normal. She has no decreased breath sounds. She has no wheezes. She has no rhonchi. She has no rales.   Abdominal: Soft. Bowel sounds are normal.   Musculoskeletal: Normal range of motion.   Neurological: She is alert and oriented to person, place, and time.   Skin: Skin is warm and dry. Rash noted. Rash is maculopapular.   Maculopapular rash on the back   Psychiatric: She has a normal mood and affect.   Nursing note and vitals  reviewed.  Examined patient on 7/1.  No significant changes noted from 6/30 except as documented    Fluids  No intake or output data in the 24 hours ending 07/01/19 1713    Laboratory  Recent Labs      06/29/19   1135 07/01/19   0318   WBC  11.2*  12.3*   RBC  4.51  4.20   HEMOGLOBIN  14.1  13.1   HEMATOCRIT  43.0  39.3   MCV  95.3  93.6   MCH  31.3  31.2   MCHC  32.8*  33.3*   RDW  49.5  48.3   PLATELETCT  152*  192   MPV  10.4  11.1     Recent Labs      06/29/19   1135  07/01/19   0318   SODIUM  140  136   POTASSIUM  4.2  4.1   CHLORIDE  104  101   CO2  22  25   GLUCOSE  177*  141*   BUN  20  20   CREATININE  0.81  0.67   CALCIUM  9.4  9.0                   Imaging  IR-US GUIDED PIV   Final Result    Ultrasound-guided PERIPHERAL IV INSERTION performed by    qualified nursing staff as above.            [id number] WITH   Final Result      No periorbital or retro-orbital inflammatory changes are identified.      MRI is suggested for further evaluation of patient's symptoms.      Minimal mucosal thickening in the maxillary sinuses.      Mild asymmetric mucosal thickening along the nasal turbinates on the right.              Assessment/Plan  * Optic neuritis- (present on admission)   Assessment & Plan    This is a recurrent and appears to be strong association with a decrease in her steroid taper and she has had this in the past as well  Dr. Yousif, neuro-opthalmology has been consulted and recommends 500 mg IV Solu-Medrol  Additionally, recommended IVIG for 5 days 2 g/kg, which is not available unfortunately  Plan to switch to p.o. steroids after completion of 4 days of IV methylprednisone.  Plan to switch patient to methotrexate at some point  Dr. Yousif would like to take patient to his clinic while she is hospitalized for the ophthalmic evaluation  Patient optic neuritis is not formally confirmed and been treated empirically as for CRION  MRI could not be done due to presence of stimulator for bowel and  bladder incontinence    CT scan of the orbits (as she cannot tolerate an MRI due to her spinal cord stimulator) noted, without inflammatory changes in the periorbital space       Controlled type 2 diabetes mellitus without complication, without long-term current use of insulin ([facility])- (present on admission)   Assessment & Plan    Ms. Balderrama is not on insulin as an outpatient and no medication  Mildly hyperglycemic  As she now is on IV steroids, she will likely require insulin which has been ordered for sliding scale  She may be a good candidate for metformin as an outpatient  A1c 6.1 three months ago     Schizoaffective disorder ([facility])- (present on admission)   Assessment & Plan    She is followed by psychiatry as an outpatient and continue Geodon  Denies suicidal ideations or hallucinations     Rash- (present on admission)   Assessment & Plan    Maculopapular rash on the back, associated with this pruritus  Unclear etiology  Started on Claritin  Patient is getting steroids IV  Monitor     Dysuria- (present on admission)   Assessment & Plan    Patient has been treated for UTI about 2 weeks ago with Bactrim for 3 days.  Reports dysuria second day after admission  We will check UA  Pyridium for symptoms     On home oxygen therapy- (present on admission)   Assessment & Plan    She is on home oxygen between 2 to 4 L 24 hours a day reportedly due to asthma  Respiratory therapy has been consulted for ongoing continuous oxygen    On 2 L, baseline     Chest pain- (present on admission)   Assessment & Plan    Patient is known to have recurrent episodes of chest pain.  She is on Ivabradin for heart rate related chest pain and following with cardiology  Her chest pain is atypical.  She has no history of ICH, but has diabetes type 2, obesity   EKG did not reveal acute ischemia.  Troponin and d-dimer are not elevated  Follow-up with cardiology as outpatient     Bowel and bladder incontinence- (present on admission)   Assessment &  Plan    With a spinal cord stimulator     Arthralgia- (present on admission)   Assessment & Plan    Chronic polyarthralgia.  Continue Voltaren cream     Peripheral neuropathy ([facility])- (present on admission)   Assessment & Plan    She is on Lyrica     H/O prior ablation treatment- (present on admission)   Assessment & Plan    Is followed by Dr. Leon as an outpatient  She will continue on Corlanr which she takes as an outpatient     Morbid obesity with BMI of 45.0-49.9, adult ([facility])- (present on admission)   Assessment & Plan    BMI is 47  Recommended outpatient weight management          VTE prophylaxis: Heparin

## 2019-07-02 NOTE — WOUND TEAM
Wound consult placed for bilateral breast wounds.  Patient being seen at outpatient clinic and  case management for obtaining referral for continued care.  Removed dressings placed by staff and measurements taken.  Covered with pieces non adhesive foam and secured with thin hydrocolloid dressing.  There are 3 wounds proximal left breast and an intact scab right breast.  These dressing are for protection until patient can be seen at outpatient clinic next week.  Plan is to discharge today or tomorrow.

## 2019-07-03 VITALS
BODY MASS INDEX: 47.13 KG/M2 | RESPIRATION RATE: 16 BRPM | OXYGEN SATURATION: 94 % | WEIGHT: 282.85 LBS | HEIGHT: 65 IN | HEART RATE: 66 BPM | TEMPERATURE: 96.5 F | SYSTOLIC BLOOD PRESSURE: 131 MMHG | DIASTOLIC BLOOD PRESSURE: 75 MMHG

## 2019-07-03 LAB
GLUCOSE BLD-MCNC: 145 MG/DL (ref 65–99)
GLUCOSE BLD-MCNC: 173 MG/DL (ref 65–99)

## 2019-07-03 PROCEDURE — 700102 HCHG RX REV CODE 250 W/ 637 OVERRIDE(OP): Performed by: HOSPITALIST

## 2019-07-03 PROCEDURE — A9270 NON-COVERED ITEM OR SERVICE: HCPCS | Performed by: INTERNAL MEDICINE

## 2019-07-03 PROCEDURE — 700102 HCHG RX REV CODE 250 W/ 637 OVERRIDE(OP): Performed by: INTERNAL MEDICINE

## 2019-07-03 PROCEDURE — 700102 HCHG RX REV CODE 250 W/ 637 OVERRIDE(OP): Performed by: PSYCHIATRY & NEUROLOGY

## 2019-07-03 PROCEDURE — A9270 NON-COVERED ITEM OR SERVICE: HCPCS | Performed by: HOSPITALIST

## 2019-07-03 PROCEDURE — A9270 NON-COVERED ITEM OR SERVICE: HCPCS | Performed by: PSYCHIATRY & NEUROLOGY

## 2019-07-03 PROCEDURE — 700111 HCHG RX REV CODE 636 W/ 250 OVERRIDE (IP): Performed by: INTERNAL MEDICINE

## 2019-07-03 PROCEDURE — 700105 HCHG RX REV CODE 258: Performed by: INTERNAL MEDICINE

## 2019-07-03 PROCEDURE — 82962 GLUCOSE BLOOD TEST: CPT

## 2019-07-03 PROCEDURE — 700111 HCHG RX REV CODE 636 W/ 250 OVERRIDE (IP): Performed by: HOSPITALIST

## 2019-07-03 PROCEDURE — 99239 HOSP IP/OBS DSCHRG MGMT >30: CPT | Performed by: HOSPITALIST

## 2019-07-03 RX ORDER — FOLIC ACID 1 MG/1
1 TABLET ORAL DAILY
Qty: 30 TAB | Refills: 2 | Status: SHIPPED | OUTPATIENT
Start: 2019-07-04 | End: 2019-08-29

## 2019-07-03 RX ORDER — FLUOXETINE HYDROCHLORIDE 20 MG/1
20 CAPSULE ORAL DAILY
Qty: 30 CAP | Refills: 2 | Status: SHIPPED | OUTPATIENT
Start: 2019-07-04 | End: 2019-07-08

## 2019-07-03 RX ORDER — PREDNISONE 10 MG/1
30 TABLET ORAL DAILY
Qty: 30 TAB | Refills: 0
Start: 2019-07-03 | End: 2019-07-08

## 2019-07-03 RX ADMIN — HYDROCORTISONE ACETATE 25 MG: 25 SUPPOSITORY RECTAL at 06:10

## 2019-07-03 RX ADMIN — SODIUM BICARBONATE 325 MG: 650 TABLET ORAL at 15:14

## 2019-07-03 RX ADMIN — FLUOXETINE HYDROCHLORIDE 20 MG: 20 CAPSULE ORAL at 06:10

## 2019-07-03 RX ADMIN — BUSPIRONE HYDROCHLORIDE 5 MG: 10 TABLET ORAL at 06:10

## 2019-07-03 RX ADMIN — SENNOSIDES, DOCUSATE SODIUM 2 TABLET: 50; 8.6 TABLET, FILM COATED ORAL at 06:10

## 2019-07-03 RX ADMIN — POLYETHYLENE GLYCOL 3350 1 PACKET: 17 POWDER, FOR SOLUTION ORAL at 06:09

## 2019-07-03 RX ADMIN — ONDANSETRON 4 MG: 2 INJECTION INTRAMUSCULAR; INTRAVENOUS at 12:38

## 2019-07-03 RX ADMIN — CIPROFLOXACIN HYDROCHLORIDE 5 DROP: 3.5 SOLUTION/ DROPS TOPICAL at 06:11

## 2019-07-03 RX ADMIN — ZIPRASIDONE HYDROCHLORIDE 80 MG: 80 CAPSULE ORAL at 06:10

## 2019-07-03 RX ADMIN — ONDANSETRON 4 MG: 2 INJECTION INTRAMUSCULAR; INTRAVENOUS at 07:39

## 2019-07-03 RX ADMIN — LORATADINE 10 MG: 10 TABLET ORAL at 06:10

## 2019-07-03 RX ADMIN — FUROSEMIDE 80 MG: 40 TABLET ORAL at 06:10

## 2019-07-03 RX ADMIN — OXYCODONE HYDROCHLORIDE 5 MG: 5 TABLET ORAL at 12:38

## 2019-07-03 RX ADMIN — ASPIRIN 81 MG: 81 TABLET, COATED ORAL at 06:09

## 2019-07-03 RX ADMIN — INSULIN HUMAN 3 UNITS: 100 INJECTION, SOLUTION PARENTERAL at 11:35

## 2019-07-03 RX ADMIN — METHOCARBAMOL TABLETS 750 MG: 750 TABLET, COATED ORAL at 11:38

## 2019-07-03 RX ADMIN — SODIUM CHLORIDE 1000 MG: 9 INJECTION, SOLUTION INTRAVENOUS at 06:10

## 2019-07-03 RX ADMIN — FOLIC ACID 1 MG: 1 TABLET ORAL at 06:09

## 2019-07-03 RX ADMIN — METHOCARBAMOL TABLETS 750 MG: 750 TABLET, COATED ORAL at 06:09

## 2019-07-03 RX ADMIN — ENOXAPARIN SODIUM 40 MG: 100 INJECTION SUBCUTANEOUS at 06:10

## 2019-07-03 RX ADMIN — OXYCODONE HYDROCHLORIDE 5 MG: 5 TABLET ORAL at 06:16

## 2019-07-03 RX ADMIN — PREGABALIN 300 MG: 100 CAPSULE ORAL at 06:08

## 2019-07-03 RX ADMIN — SODIUM BICARBONATE 650 MG: 650 TABLET ORAL at 06:09

## 2019-07-03 RX ADMIN — ACETAMINOPHEN 650 MG: 325 TABLET, FILM COATED ORAL at 11:38
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
## 2019-07-03 NOTE — DOCUMENTATION QUERY
[facility]                                                                       Query Response Note      PATIENT:               HARLEY DE LA CRUZ  ACCT #:                  [id number]  MRN:                     [id number]  :                      1990  ADMIT DATE:       2019 9:53 AM  DISCH DATE:          RESPONDING  PROVIDER #:        [id number]           QUERY TEXT:    Chronic oxygen dependency is documented in the Medical Record. Please further specify the condition this patient has    NOTE:  If an appropriate response is not listed below, please respond with a new note.      The patient's Clinical Indicators include:  chronic oxygen dependency due to asthma.    Per H/P: on 2-4 L 24 hours a day reportedly due to asthma   Per vitals flowsheet:  92% 4L,  93% 2L  Risk Factors: asthma, history of sleep apnea, morbid obesity   Treatment: supplemental 02, RT therapy per protocol    Query created by: Liza Echols on 7/3/2019 7:20 AM    RESPONSE TEXT:    Chronic Respiratory Failure          Electronically signed by:  LINDA PEOPLES MD 7/3/2019 7:32 AM

## 2019-07-03 NOTE — DISCHARGE INSTRUCTIONS
Discharge Instructions    Discharged to home by car with relative. Discharged via wheelchair, hospital escort: Refused.  Special equipment needed: Oxygen and Walker    Be sure to schedule a follow-up appointment with your primary care doctor or any specialists as instructed.     Discharge Plan:   Diet Plan: Discussed  Activity Level: Discussed  Confirmed Follow up Appointment: Appointment Scheduled  Confirmed Symptoms Management: Discussed  Medication Reconciliation Updated: Yes  Influenza Vaccine Indication: Patient Refuses    I understand that a diet low in cholesterol, fat, and sodium is recommended for good health. Unless I have been given specific instructions below for another diet, I accept this instruction as my diet prescription.   Other diet: Diabetic      Special Instructions: None    · Is patient discharged on Warfarin / Coumadin?   No     Depression / Suicide Risk    As you are discharged from this [facility] facility, it is important to learn how to keep safe from harming yourself.    Recognize the warning signs:  · Abrupt changes in personality, positive or negative- including increase in energy   · Giving away possessions  · Change in eating patterns- significant weight changes-  positive or negative  · Change in sleeping patterns- unable to sleep or sleeping all the time   · Unwillingness or inability to communicate  · Depression  · Unusual sadness, discouragement and loneliness  · Talk of wanting to die  · Neglect of personal appearance   · Rebelliousness- reckless behavior  · Withdrawal from people/activities they love  · Confusion- inability to concentrate     If you or a loved one observes any of these behaviors or has concerns about self-harm, here's what you can do:  · Talk about it- your feelings and reasons for harming yourself  · Remove any means that you might use to hurt yourself (examples: pills, rope, extension cords, firearm)  · Get professional help from the community (Mental  Health, Substance Abuse, psychological counseling)  · Do not be alone:Call your Safe Contact- someone whom you trust who will be there for you.  · Call your local CRISIS HOTLINE 155-4703 or 595-505-4689  · Call your local Children's Mobile Crisis Response Team [place] (947) 615-8436 or www.bookletmobile  · Call the toll free National Suicide Prevention Hotlines   · National Suicide Prevention Lifeline 378-510-SLPK (0806)  · National Hope Line Network 800-SUICIDE (921-8164)

## 2019-07-03 NOTE — PROGRESS NOTES
Pt discharged via wheelchair. Discharge education provided to patient and grandmother, pt expressed understanding of education. PIV removed, dressing applied. All belongings accounted for. Home medications given back to patient.

## 2019-07-03 NOTE — PROGRESS NOTES
Hospital Medicine Daily Progress Note    Date of Service  7/2/2019    Chief Complaint  28 y.o. female admitted 6/29/2019 with left eye pain and blurry vision.     Hospital Course    28-year-old female history of obesity, tachycardia post ablation, bowel and bladder incontinence with spinal stimulator O2 dependence, asthma, recurrent optic neuritis admitted with recurrent of optic neuritis.  Started on IV steroids.      Interval Problem Update    On high-dose IV pulse steroids . Reports persistent dull ache.  Blurry vision has improved.    Consultants/Specialty  Neurology Dr. Yousif       Code Status  full    Disposition  Anticipate DC home with improved symptoms       Review of Systems  Review of Systems   Constitutional: Negative for chills, diaphoresis, fever and malaise/fatigue.   HENT: Negative for congestion.    Eyes: Positive for blurred vision and pain. Negative for discharge and redness.   Respiratory: Negative for cough, shortness of breath, wheezing and stridor.    Cardiovascular: Positive for leg swelling (chronic, resolving. ). Negative for chest pain and palpitations.   Gastrointestinal: Negative for abdominal pain, diarrhea and vomiting.   Genitourinary: Positive for dysuria. Negative for flank pain and hematuria.   Musculoskeletal: Positive for joint pain. Negative for back pain and neck pain.   Skin: Positive for itching and rash.   Neurological: Negative for tremors, sensory change, speech change, focal weakness and weakness.   Psychiatric/Behavioral: Negative for hallucinations and substance abuse.        Physical Exam  Temp:  [36.2 °C (97.1 °F)-36.5 °C (97.7 °F)] 36.2 °C (97.1 °F)  Pulse:  [65-72] 70  Resp:  [14-18] 16  BP: (123-138)/(66-81) 123/71  SpO2:  [90 %-93 %] 93 %    Physical Exam   Constitutional: She is oriented to person, place, and time. No distress.   Morbidly obese  Alert, approp oriented  Wearing shades   HENT:   Head: Normocephalic and atraumatic.   Right Ear: External ear  normal.   Left Ear: External ear normal.   Nose: Nose normal.   Eyes: EOM are normal. Right eye exhibits no discharge. Left eye exhibits no discharge. No scleral icterus.   Neck: Neck supple. No JVD present.   Cardiovascular: Normal rate and regular rhythm.    No murmur heard.  Pulmonary/Chest: Effort normal. No stridor. She has no wheezes. She has no rales.   Abdominal: Soft. Bowel sounds are normal. She exhibits no distension. There is no tenderness.   Musculoskeletal: She exhibits no edema or tenderness.   Neurological: She is alert and oriented to person, place, and time.   nofocal weakness   Skin: Skin is warm and dry. Rash (macular pap rash back- mild ) noted. She is not diaphoretic. No pallor.   Psychiatric: She has a normal mood and affect. Her behavior is normal.   Vitals reviewed.      Fluids  No intake or output data in the 24 hours ending 07/02/19 1846    Laboratory  Recent Labs      07/01/19   0318   WBC  12.3*   RBC  4.20   HEMOGLOBIN  13.1   HEMATOCRIT  39.3   MCV  93.6   MCH  31.2   MCHC  33.3*   RDW  48.3   PLATELETCT  192   MPV  11.1     Recent Labs      07/01/19   0318   SODIUM  136   POTASSIUM  4.1   CHLORIDE  101   CO2  25   GLUCOSE  141*   BUN  20   CREATININE  0.67   CALCIUM  9.0                   Imaging  IR-US GUIDED PIV   Final Result    Ultrasound-guided PERIPHERAL IV INSERTION performed by    qualified nursing staff as above.            [id number] WITH   Final Result      No periorbital or retro-orbital inflammatory changes are identified.      MRI is suggested for further evaluation of patient's symptoms.      Minimal mucosal thickening in the maxillary sinuses.      Mild asymmetric mucosal thickening along the nasal turbinates on the right.              Assessment/Plan  * Optic neuritis- (present on admission)   Assessment & Plan    This is a recurrent and appears to be strong association with a decrease in her steroid taper and she has had this in the past as well.  Cannot do MRI.   CT orbits without inflammatory changes  Dr. Yousif, neuro-opthalmology has been consulted -recommend continue IV Solu-Medrol 1 g IV daily through 7/3/2019 followed by Medrol Dosepak  Additionally, recommended IVIG for 5 days 2 g/kg, which is not available unfortunately  Follow-up with Dr. Carreon he - plan to switch patient to methotrexate at some point  Patient optic neuritis is not formally confirmed and will need outpatient follow-up with neuro-ophthalmology           Controlled type 2 diabetes mellitus without complication, without long-term current use of insulin (HCC)- (present on admission)   Assessment & Plan    Ms. Balderrama is not on insulin as an outpatient and no medication.  A1c 6.1.  Mildly hyperglycemic  As she now is on IV steroids, she will likely require insulin which has been ordered for sliding scale  Outpatient follow-up, advised weight loss  Outpatient monitoring, follow-up - may be candidate for metformin.      Schizoaffective disorder (HCC)- (present on admission)   Assessment & Plan    She is followed by psychiatry as an outpatient and continue Geodon.  Stable mood       Rash- (present on admission)   Assessment & Plan    Maculopapular rash on the back, associated with this pruritus-possible hypersensitivity, heat rash  Unclear etiology  Continue with Claritin Claritin  Patient is getting steroids IV  Monitor clinical response     Dysuria- (present on admission)   Assessment & Plan    Patient has been treated for UTI about 2 weeks ago with Bactrim for 3 days.  Recent urine culture 6/27/2019.   Reports dysuria second day after admission  Pyridium for symptoms  Patient has outpatient follow-up with urology Dr. Rosenbaum.  No fevers-follow clinically  Fu repeat cultures.      On home oxygen therapy- (present on admission)   Assessment & Plan    She is on home oxygen between 2 to 4 L 24 hours a day reportedly due to asthma  Respiratory therapy has been consulted for ongoing continuous  oxygen  Continue O2 2 L at baseline     Chest pain- (present on admission)   Assessment & Plan    Patient is known to have recurrent episodes of chest pain.  She is on Ivabradin for heart rate related chest pain and following with cardiology.  Earlier symptoms atypical-no new symptoms.  EKG did not reveal acute ischemia.  Troponin and d-dimer are not elevated  Follow-up with cardiology as outpatient     Bowel and bladder incontinence- (present on admission)   Assessment & Plan    With a spinal cord stimulator     Arthralgia- (present on admission)   Assessment & Plan    Chronic polyarthralgia.  No acute joint swelling warmth or redness.   Continue Voltaren cream     Peripheral neuropathy (CMS-HCC)- (present on admission)   Assessment & Plan    She is on Lyrica     H/O prior ablation treatment- (present on admission)   Assessment & Plan    Is followed by Dr. Leon as an outpatient  She will continue on Corlanr which she takes as an outpatient     Morbid obesity with BMI of 45.0-49.9, adult ([facility])- (present on admission)   Assessment & Plan    BMI is 47  Recommended outpatient weight management          VTE prophylaxis: Lovenox    Discussed with multi disciplinary team plan of care.

## 2019-07-03 NOTE — CARE PLAN
Problem: Safety  Goal: Will remain free from injury  Outcome: PROGRESSING AS EXPECTED  Pt calls appropriately for assistance and needs.    Problem: Pain Management  Goal: Pain level will decrease to patient's comfort goal  Outcome: PROGRESSING AS EXPECTED  Pt resting following prn pain medications.

## 2019-07-03 NOTE — CARE PLAN
Problem: Safety  Goal: Will remain free from injury  Outcome: PROGRESSING AS EXPECTED  Bed locked and in lowest position. Call light and personal belongings in reach. Bed alarm in place. Hourly rounding.     Problem: Venous Thromboembolism (VTW)/Deep Vein Thrombosis (DVT) Prevention:  Goal: Patient will participate in Venous Thrombosis (VTE)/Deep Vein Thrombosis (DVT)Prevention Measures  Outcome: PROGRESSING AS EXPECTED  Pt currently refusing SCDs. Will continue to provide education and encourage use throughout shift. Lovenox for DVT prophylaxis. Encouraging ambulation as tolerated.

## 2019-07-04 NOTE — DISCHARGE SUMMARY
Hospital Medicine Discharge Note     Admit Date:  6/29/2019       Discharge Date:   7/3/2019    Attending Physician:  Hari Bowman M.D.      Diagnoses (includes active and resolved):     Principal Problem:    Optic neuritis POA: Yes  Active Problems:    Controlled type 2 diabetes mellitus without complication, without long-term current use of insulin (HCC) POA: Yes    Morbid obesity with BMI of 45.0-49.9, adult (HCC) POA: Yes    H/O prior ablation treatment POA: Yes      Overview: Sinus node modification    Peripheral neuropathy (CMS-HCC) (Chronic) POA: Yes    Arthralgia POA: Yes    Bowel and bladder incontinence POA: Yes    Chest pain POA: Yes    On home oxygen therapy (Chronic) POA: Yes    Dysuria POA: Yes    Rash POA: Yes    Schizoaffective disorder (HCC) POA: Yes      Hospital Summary (Brief Narrative):          28-year-old female history of morbid obesity, tachycardia post ablation, hematuria recently treated for Bactrim, chronic breast ulcerations, bowel and bladder incontinence with spinal stimulator O2 dependence, asthma, schizoaffective disorder , recurrent optic neuritis admitted with symptoms of left eye pain with blurry vision and light sensitivity.  Previously she had tapered off Solu-Medrol as well as prednisone.  Prior to admission tapered prednisone from 20 mg down to 10 mg and immediately noticed aching in left eye with photophobia.  She had no eye drainage, fevers.  Unable to do MRI of brain a CT of orbits revealed no localized inflammation.  Patient was consulted by Dr. Newton neuro ophthalmology.  She was placed on IV Solu-Medrol 1 g IV daily for 3 days.  She was recommended also for IVIG x5 days unfortunately not available.  She had episodes of recurrent chest pain, cardiac enzymes negative with troponin normal.  No findings for acute coronary syndrome.  Her Cloranor was increased.  She had no further chest pain symptoms.  Asthma stable with O2 requirements at baseline.  At times was anxious  possible depression component her Prozac was increased.  IV steroid therapy her eye pain and photophobia resolved.  Did not require shades.  She had a maculopapular rash on her back possible hyper sensitivity/heat sensitivity rash, slowly improved.  She has history of hematuria UA revealed large blood.  She had recently finished Bactrim with urine culture negative.  She was recommended for outpatient follow-up as previously arranged with her urologist Dr. Rosenbaum.  She had elevated white count of 12,000 likely from steroids.  She was advised trial of routine exercise, weight loss efforts.  Follow-up with provider regarding her hyperglycemia, likely component of steroid use.  Dr. Newton recommended starting patient on methotrexate on discharge weekly in addition to a slow steroid taper with prednisone.  She was advised  outpatient follow-up with neuro-ophthalmology.  On day of discharge I examined patient, discussed findings, plan of care and follow-up.  Wound care was arranged for small multiple chronic breast wounds.  Patient discharged home in stable condition.     Consultants:        Dr. Newton, Neuro- opthalmology     Imaging/ Testing:      IR-US GUIDED PIV   Final Result    Ultrasound-guided PERIPHERAL IV INSERTION performed by    qualified nursing staff as above.            [id number] WITH   Final Result      No periorbital or retro-orbital inflammatory changes are identified.      MRI is suggested for further evaluation of patient's symptoms.      Minimal mucosal thickening in the maxillary sinuses.      Mild asymmetric mucosal thickening along the nasal turbinates on the right.               Procedures:          None     Discharge Medications:             Medication List      START taking these medications      Instructions   folic acid 1 MG Tabs  Commonly known as:  FOLVITE   Take 1 Tab by mouth every day.  Dose:  1 mg     methotrexate 2.5 MG Tabs   Take 6 Tabs by mouth every 7 days.  Dose:  15 mg         CHANGE how you take these medications      Instructions   FLUoxetine 20 MG Caps  What changed:  · medication strength  · how much to take  · how to take this  · when to take this  · additional instructions  Commonly known as:  PROZAC   Take 1 Cap by mouth every day.  Dose:  20 mg     ivabradine 5 MG Tabs tablet  What changed:  how much to take  Commonly known as:  CORLANOR   Take 1.5 Tabs by mouth 2 times a day, with meals.  Dose:  7.5 mg     predniSONE 10 MG Tabs  What changed:  · how much to take  · additional instructions  Commonly known as:  DELTASONE   Take 3 Tabs by mouth every day. X 3 weeks then 25 mg daily x 3 weeks then 20 mg x 3 weeks then 15 mg daily as prescribed by Dr. Newton.  Please follow up with Dr. Newton  Dose:  30 mg        CONTINUE taking these medications      Instructions   albuterol 108 (90 Base) MCG/ACT Aers inhalation aerosol   Inhale 2 Puffs by mouth every 6 hours as needed for Shortness of Breath.  Dose:  2 Puff     aspirin EC 81 MG Tbec  Commonly known as:  ECOTRIN   Take 1 Tab by mouth every day.  Dose:  81 mg     busPIRone 5 MG tablet  Commonly known as:  BUSPAR   TAKE ONE TABLET BY MOUTH TWICE DAILY     DEBLITANE 0.35 MG tablet  Generic drug:  norethindrone   Take 0.35 mg by mouth every day.  Dose:  0.35 mg     Diclofenac Sodium 1 % Gel  Commonly known as:  VOLTAREN   Apply 1 Inch to skin as directed 4 times a day.  Dose:  1 Inch     furosemide 80 MG Tabs  Commonly known as:  LASIX   Take 80 mg by mouth every day.  Dose:  80 mg     lactulose 10 GM/15ML Soln   Take 10 g by mouth 2 times a day as needed.  Dose:  10 g     LYRICA 300 MG capsule  Generic drug:  pregabalin   Take 300 mg by mouth 2 times a day.  Dose:  300 mg     Melatonin 5 MG Tabs   Take 10 mg by mouth every evening.  Dose:  10 mg     methocarbamol 750 MG Tabs  Commonly known as:  ROBAXIN   Take 750 mg by mouth 3 times a day.  Dose:  750 mg     phenazopyridine 200 MG Tabs  Commonly known as:  PYRIDIUM   Take 1  Tab by mouth 3 times a day as needed.  Dose:  200 mg     sodium bicarbonate 325 MG Tabs   Take 325-650 mg by mouth 3 times a day. 650 mg in  mg mid-afternoon 650 mg at night  Dose:  325-650 mg     traZODone 100 MG Tabs  Commonly known as:  DESYREL   TAKE  ONE TABLET BY MOUTH NIGHTLY AT BEDTIME     TYLENOL PM EXTRA STRENGTH PO   Take 1 Cap by mouth every day.  Dose:  1 Cap     vitamin D (Ergocalciferol) 06684 units Caps capsule  Commonly known as:  DRISDOL   Take 50,000 Units by mouth every Friday.  Dose:  39307 Units     ziprasidone 80 MG Caps  Commonly known as:  GEODON   Take 80 mg by mouth 2 Times a Day.  Dose:  80 mg        STOP taking these medications    sulfamethoxazole-trimethoprim 800-160 MG tablet  Commonly known as:  BACTRIM DS               Diet:       DIET ORDERS (Through next 24h)    Cardiac             Activity:   As tolerated.      Code status:   .    Primary Care Provider:    Torres Brody M.D.    Follow up appointment details :      .  Torres Brody M.D.  [address] [facility] [address]  [place] [address]  [phone]    In 2 weeks      Luis Felipe Yousif D.O.  [address] [facility] [address]  [place] [address]  [phone]    In 4 weeks  or as scheduled     Future Appointments  Date Time Provider Department Center   7/6/2019 1:30 PM [facility]   7/9/2019 4:30 PM Emmy Cespedes, PT, DPT [address] [place] [address]   7/10/2019 7:30 AM Junie Arriaga R.N. [facility] [address] [place] [address]   7/16/2019 10:30 AM Ursula Escalera, PT [address] [place] [address]   7/17/2019 7:30 AM TAY VidesND [address] [place] [address]   7/23/2019 11:00 AM Ursula Escalera, PT [address] [place] [address]   7/25/2019 11:00 AM Ursula Escalera, PT [address] [place] [address]   8/22/2019 11:00 AM Ronny Burnette M.D. [facility] [address]   10/2/2019 8:40 AM John Leon M.D. CB None   11/1/2019 3:00 PM RUFUS Romeo None           Time spent on discharge day patient visit: 40  minutes    #################################################

## 2019-07-05 ENCOUNTER — OFFICE VISIT (OUTPATIENT)
Dept: MEDICAL GROUP | Facility: MEDICAL CENTER | Age: 29
End: 2019-07-05
Payer: MEDICARE

## 2019-07-05 VITALS
SYSTOLIC BLOOD PRESSURE: 120 MMHG | DIASTOLIC BLOOD PRESSURE: 62 MMHG | TEMPERATURE: 97.3 F | OXYGEN SATURATION: 94 % | HEART RATE: 89 BPM

## 2019-07-05 DIAGNOSIS — H46.9 OPTIC NEURITIS: ICD-10-CM

## 2019-07-05 DIAGNOSIS — E11.9 CONTROLLED TYPE 2 DIABETES MELLITUS WITHOUT COMPLICATION, WITHOUT LONG-TERM CURRENT USE OF INSULIN (HCC): ICD-10-CM

## 2019-07-05 PROBLEM — R30.0 DYSURIA: Status: RESOLVED | Noted: 2017-05-09 | Resolved: 2019-07-05

## 2019-07-05 PROBLEM — Z93.59 CHRONIC SUPRAPUBIC CATHETER (HCC): Chronic | Status: RESOLVED | Noted: 2017-02-16 | Resolved: 2019-07-05
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
## 2019-07-05 PROBLEM — F25.9 SCHIZOAFFECTIVE DISORDER (HCC): Status: RESOLVED | Noted: 2019-06-29 | Resolved: 2019-07-05

## 2019-07-05 PROBLEM — R53.1 WEAKNESS: Status: RESOLVED | Noted: 2019-05-29 | Resolved: 2019-07-05
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
## 2019-07-05 PROBLEM — K59.00 CONSTIPATION: Status: RESOLVED | Noted: 2019-05-29 | Resolved: 2019-07-05

## 2019-07-05 PROBLEM — R21 RASH: Status: RESOLVED | Noted: 2017-06-09 | Resolved: 2019-07-05

## 2019-07-05 PROBLEM — R07.9 CHEST PAIN: Status: RESOLVED | Noted: 2017-03-30 | Resolved: 2019-07-05

## 2019-07-05 PROBLEM — R82.71 ASYMPTOMATIC BACTERIURIA: Status: RESOLVED | Noted: 2018-10-26 | Resolved: 2019-07-05

## 2019-07-05 PROCEDURE — 99214 OFFICE O/P EST MOD 30 MIN: CPT | Performed by: INTERNAL MEDICINE

## 2019-07-05 RX ORDER — ONDANSETRON 4 MG/1
4 TABLET, FILM COATED ORAL EVERY 4 HOURS PRN
Qty: 20 TAB | Refills: 3 | Status: SHIPPED | OUTPATIENT
Start: 2019-07-05 | End: 2019-11-06

## 2019-07-05 NOTE — PROGRESS NOTES
CC: Hospital follow-up optic neuritis, diabetes.                                                                                                                                      HPI:   Kristin presents today with the following.    1. Optic neuritis  Presents with optic neuritis recently initiated on methotrexate.  She reports she is tolerating it well.  She does have follow-up with her eye doctor in the coming weeks.  She is also maintain on the high-dose steroid.    2. Controlled type 2 diabetes mellitus without complication, without long-term current use of insulin (formerly [facility])  Patient was doing quite well off of steroids with her A1c 6.1.  She is actually bit too early to check another one.  Well in the hospital sugars were running elevated.  She tolerated Victoza well in the past.      Patient Active Problem List    Diagnosis Date Noted   • Left leg weakness 07/14/2018     Priority: High   • Controlled type 2 diabetes mellitus without complication, without long-term current use of insulin (formerly [facility]) 04/26/2017     Priority: High   • Sinus tachycardia 10/31/2013     Priority: High   • Chronic inflammatory arthritis 05/23/2016     Priority: Medium   • Morbid obesity with BMI of 45.0-49.9, adult (formerly [facility]) 10/24/2017     Priority: Low   • Depression 10/28/2016     Priority: Low   • Schizophrenia (formerly [facility]) 10/27/2016     Priority: Low   • PCOS (polycystic ovarian syndrome) 11/23/2015     Priority: Low   • Progressive focal motor weakness 06/28/2015     Priority: Low   • Fatty liver disease, nonalcoholic 01/19/2015     Priority: Low   • Anxiety 12/16/2014     Priority: Low   • Knee pain, right 02/13/2014     Priority: Low   • Neurogenic bladder 04/02/2011     Priority: Low   • Recurrent UTI 09/18/2010     Priority: Low   • Borderline personality disorder in adult (formerly [facility]) 09/18/2010     Priority: Low   • Moderate intermittent asthma without complication 06/20/2019   • Optic neuritis 05/27/2019   • Inappropriate sinus tachycardia  04/10/2019   • Palpitations 10/01/2018   • Chronic respiratory failure with hypoxia, on home oxygen therapy ([facility]) 08/08/2018   • Transaminitis 08/08/2018   • TONYA (obstructive sleep apnea) 01/09/2018   • Functional diarrhea 01/05/2018   • Breast wound 11/06/2017   • Intractable episodic cluster headache 09/14/2017   • Hashimoto's encephalopathy 05/17/2017   • On home oxygen therapy 04/15/2017   • Weakness of right upper extremity 02/23/2017   • Hypovitaminosis D 11/29/2016   • Chronic pain syndrome 10/27/2016   • Bowel and bladder incontinence 10/27/2016   • Galactorrhea 07/22/2016   • Elevated sedimentation rate 06/27/2016   • Weakness of both lower extremities 06/22/2016   • Vitamin D deficiency 05/21/2016   • Morbidly obese ([facility]) 03/07/2016   • Scoliosis 03/07/2016   • GERD (gastroesophageal reflux disease) 03/07/2016   • Peripheral neuropathy (CMS-HCC) 03/06/2016   • H/O prior ablation treatment 02/10/2016       Current Outpatient Prescriptions   Medication Sig Dispense Refill   • Dulaglutide (TRULICITY) 0.75 MG/0.5ML Solution Pen-injector Inject 0.75 mg as instructed every 7 days. 4 PEN 6   • ondansetron (ZOFRAN) 4 MG Tab tablet Take 1 Tab by mouth every four hours as needed for Nausea/Vomiting. 20 Tab 3   • FLUoxetine (PROZAC) 20 MG Cap Take 1 Cap by mouth every day. 30 Cap 2   • folic acid (FOLVITE) 1 MG Tab Take 1 Tab by mouth every day. 30 Tab 2   • ivabradine (CORLANOR) 5 MG Tab tablet Take 1.5 Tabs by mouth 2 times a day, with meals. 60 Tab 1   • methotrexate 2.5 MG Tab Take 6 Tabs by mouth every 7 days. 30 Tab 0   • predniSONE (DELTASONE) 10 MG Tab Take 3 Tabs by mouth every day. X 3 weeks then 25 mg daily x 3 weeks then 20 mg x 3 weeks then 15 mg daily as prescribed by Dr. Newton.  Please follow up with Dr. Newton 30 Tab 0   • sodium bicarbonate 325 MG Tab Take 325-650 mg by mouth 3 times a day. 650 mg in AM  325 mg mid-afternoon  650 mg at night     • albuterol 108 (90 Base) MCG/ACT Aero Soln  inhalation aerosol Inhale 2 Puffs by mouth every 6 hours as needed for Shortness of Breath.     • lactulose 10 GM/15ML Solution Take 10 g by mouth 2 times a day as needed.     • Melatonin 5 MG Tab Take 10 mg by mouth every evening.     • furosemide (LASIX) 80 MG Tab Take 80 mg by mouth every day.     • busPIRone (BUSPAR) 5 MG tablet TAKE ONE TABLET BY MOUTH TWICE DAILY 60 Tab 4   • phenazopyridine (PYRIDIUM) 200 MG Tab Take 1 Tab by mouth 3 times a day as needed. 30 Tab 2   • vitamin D, Ergocalciferol, (DRISDOL) 40903 units Cap capsule Take 50,000 Units by mouth every Friday.     • norethindrone (DEBLITANE) 0.35 MG tablet Take 0.35 mg by mouth every day.     • ziprasidone (GEODON) 80 MG Cap Take 80 mg by mouth 2 Times a Day.     • methocarbamol (ROBAXIN) 750 MG Tab Take 750 mg by mouth 3 times a day.     • Diclofenac Sodium (VOLTAREN) 1 % Gel Apply 1 Inch to skin as directed 4 times a day. 5 Tube 6   • traZODone (DESYREL) 100 MG Tab TAKE  ONE TABLET BY MOUTH NIGHTLY AT BEDTIME 90 Tab 2   • Diphenhydramine-APAP, sleep, (TYLENOL PM EXTRA STRENGTH PO) Take 1 Cap by mouth every day.     • LYRICA 300 MG capsule Take 300 mg by mouth 2 times a day.     • aspirin EC (ECOTRIN) 81 MG Tablet Delayed Response Take 1 Tab by mouth every day. 30 Tab 6     No current facility-administered medications for this visit.          Allergies as of 07/05/2019 - Reviewed 07/05/2019   Allergen Reaction Noted   • Cefdinir Shortness of Breath and Itching 03/01/2016   • Depakote [divalproex sodium] Unspecified 06/14/2010   • Doxycycline Anaphylaxis and Vomiting 08/15/2012   • Abilify Unspecified 01/17/2013   • Amitriptyline Unspecified 10/31/2013   • Amoxicillin Rash 09/18/2010   • Ciprofloxacin Rash 12/17/2009   • Clindamycin Nausea 02/02/2011   • Ees [erythromycin] Vomiting and Nausea 08/28/2010   • Flagyl [metronidazole hcl] Unspecified 03/31/2011   • Flomax [tamsulosin hydrochloride] Swelling 09/24/2009   • Metformin Unspecified 07/23/2013    • Tape Rash 08/15/2012   • Vancomycin Itching 07/10/2016   • Wound dressing adhesive Hives 01/12/2018   • Cephalexin [keflex] Rash 01/01/2017   • Levofloxacin Unspecified 10/27/2016   • Metronidazole Rash 03/30/2017   • Valproic acid Rash 03/30/2017        ROS: Denies Chest pain, SOB, LE edema.    /62 (Patient Position: Sitting) Comment (BP Location): l. forearm  Pulse 89   Temp 36.3 °C (97.3 °F)   LMP 06/28/2019   SpO2 94%     Physical Exam:  Gen:         Alert and oriented, No apparent distress.  Neck:        No Lymphadenopathy or Bruits.  Lungs:     Clear to auscultation bilaterally  CV:          Regular rate and rhythm. No murmurs, rubs or gallops.               Ext:          No clubbing, cyanosis, edema.      Assessment and Plan.   28 y.o. female with the following issues.    1. Optic neuritis  Will need CBC and monitoring of liver with eye doctor will follow along.    2. Controlled type 2 diabetes mellitus without complication, without long-term current use of insulin (HCC)  Starting on Trulicity at lower dose she will follow-up with diabetic nurse in 1 month to titrate medications if tolerating.  - Diabetic Monofilament Lower Extremity Exam

## 2019-07-05 NOTE — CONSULTS
DATE OF SERVICE:  07/02/2019    HISTORY OF PRESENT ILLNESS:  The patient was evaluated in [facility] on 07/02/2019.  The patient is 28 years of age and has a history of   clinically improved recurrent acute retrobulbar optic neuritis, OS, following   treatment with high-dose steroids.  In addition, the patient has presumed   acute retrobulbar optic neuritis, OD.  The patient is also followed as a   glaucoma suspect.    The patient had been taking high-dose steroids with a slow taper.  The patient   developed recurrent orbital pain, OS, during the steroid taper and it was   elected to readmit the patient to [facility].  The patient had a   stat CT scan of the orbit which was normal.  She also had appropriate   laboratory studies.  Previously, the patient has had laboratory studies   including a normal anti-MOG antibody as well as a normal aquaporin-4 level.    The patient notified the office on 06/29/2019 that she was having intense   recurrent orbital pain, OS.  It was elected to readmit the patient to [facility] for treatment with IV Solu-Medrol.  The patient has received IV   Solu-Medrol 1000 mg daily for 2 days.  The patient reports that the orbital   pain has dramatically improved.  She also reported that the dark spots   involving her central vision, OS, have improved as well.    ALLERGIES:  THE PATIENT IS ALLERGIC TO MULTIPLE MEDICATIONS INCLUDING   CEFDINIR, DEPAKOTE, ABILIFY, AMITRIPTYLINE, AMOXICILLIN, CIPROFLOXACIN,   CLINDAMYCIN, DOXYCYCLINE, ERYTHROMYCIN, FLAGYL, FLOMAX, METFORMIN, SULFA   MEDICATIONS, TAPE, VANCOMYCIN, KEFLEX, LEVOFLOXACIN AND LEVAQUIN.    MEDICATIONS:  Include Solu-Medrol 1000 mg daily for 2 days.  In addition, the   patient is also taking BuSpar 5 mg b.i.d., diclofenac gel, Lovenox 1 subQ   injection daily, Prozac 20 mg p.o. daily, folic acid 1 mg p.o. daily,   ivabradine 7.5 mg b.i.d., loratadine 10 mg p.o. daily, methocarbamol 750 mg   t.i.d.,  Zofran p.r.n., sodium bicarbonate 650 mg, p.r.n. oxycodone, Desyrel   100 mg p.o. daily.    Please refer to the medication list for additional information.    PAST MEDICAL HISTORY:  Type 2 diabetes mellitus, Hashimoto's thyroiditis and   recurrent optic neuritis.  The patient also has history of hemiplegic   migraine.    PAST SURGICAL HISTORY:  Unchanged in comparison to previous examination.  The   patient has had placement of an InterStim pacemaker (implanted unilateral   leads stimulated S3 nerve root that is attached to a small pacemaker).    Unfortunately, the patient is unable to have MRI scan imaging.    SOCIAL HISTORY:  The patient does not smoke nor drinks.    PAST FAMILY HISTORY:  Mother with history of hypertension.    REVIEW OF SYSTEMS:  The patient has history of cardiac arrhythmia.    VISUAL ACUITY:  Best corrected visual acuity, 05/03/2019, OD 20/20, OS 20/20.    Best corrected visual acuity, 07/02/2019, OD 20/20, OS 20/20, near J1 plus   J1.    HRR plates correctly recognizes 6/6 color plates, OD and 4.5/6 color plates   OS.    AMSLER GRID TESTING:  OD normal.  OS, there is a blurring on the central grid   marking.    CONFRONTATION OF VISUAL FIELDS:  OD normal.  OS, there is evidence of a   superior temporal defect.  Pupils 3.0.  There is 0.3 log unit relative   afferent pupillary defect OS.    FLICKER:  22 and 26.0.    SLIT-LAMP EXAMINATION:  There was reduced tear film OU.    INTRAOCULAR PRESSURES:  21 OD and 21 OS.    MOTILITY EXAMINATION:  Normal.    DILATED OPHTHALMOSCOPY:  The cup-to-disk ratio measured 0.5.  The right optic   nerve, macula and peripheral retina appeared normal.  OS, cup-to-disk ratio   measured 0.45.  The optic nerve, macula and peripheral retina appeared   Normal OU.    MENTAL STATUS:  Cranial nerves, motor examination full normal.    CHRISTINA VISUAL FIELD:  30-2 OD, the foveal threshold was normal at 37 dB.    The visual field was normal.  OS, the foveal threshold normal at  37 dB.  The   visual field was normal.    OCT:  The mean retinal nerve fiber layer thickness was normal OD at 92 microns   and normal OS at 90 microns.  The macular thickness map was normal OU.    FUNDUS PHOTOGRAPHY:  Showed bilateral anomalous disc.    CT scan of the orbit with and without contrast was performed on 05/27/2019.    The CT scan of the orbit with and without contrast was normal.    Laboratory studies were obtained on 06/29/2019.  The CBC shows slightly   elevated hemoglobin of 11.2.  Comprehensive metabolic panel was normal with   the exception of an elevated glucose level at 177 mg/dL.    ASSESSMENT:  1. 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  2. Steroid responsive recurrent acute retrobulbar optic neuritis, left eye.  3. Remote episode of retrobulbar optic neuritis, right eye.  4. Glaucoma suspect.  5. Small angle esotropia.  6. Type 2 diabetes mellitus.  7. Undifferentiated autoimmune disease.  8. Moderate obesity.  9. History of bowel and bladder incontinence.  10. Status post placement of an InterStim pacemaker.  11. Dysmorphic facies.  12. Remote right common peroneal nerve palsy.  13. Nonsmoker.    RECOMMENDATIONS:  1. Solu-Medrol 1000 mg IV daily for 4 days (total of 4000 mg).  Following   treatment with IV Solu-Medrol, the patient will maintain prednisone 30 mg p.o.   daily, attempting to taper 5 mg every 2 weeks down to a target dosage of 10   mg p.o. daily.  2. Begin treatment with methotrexate 5 mg p.o. weekly, increasing to maximum   target dosage of 15 mg p.o. weekly.  3. Folic acid 1 mg p.o. daily.  4. Risks versus benefits.  5. CBC and comprehensive metabolic panel every 2 months.    Time spent 45 minutes in history, physical examination, diagnosis,   differential diagnosis and treatment strategies (complex patient).             ____________________________________     DO SHAHRZAD AGOSTO / CHRISTELLE    DD:  07/04/2019 15:14:16  DT:   07/04/2019 16:14:00    D#:  [id number]  Job#:  [id number]

## 2019-07-06 ENCOUNTER — HOSPITAL ENCOUNTER (OUTPATIENT)
Dept: RADIOLOGY | Facility: MEDICAL CENTER | Age: 29
End: 2019-07-06
Attending: NURSE PRACTITIONER
Payer: MEDICARE

## 2019-07-06 DIAGNOSIS — R31.21 ASYMPTOMATIC MICROSCOPIC HEMATURIA: ICD-10-CM

## 2019-07-06 PROCEDURE — 74178 CT ABD&PLV WO CNTR FLWD CNTR: CPT

## 2019-07-06 PROCEDURE — 700117 HCHG RX CONTRAST REV CODE 255: Performed by: NURSE PRACTITIONER

## 2019-07-06 RX ADMIN — IOHEXOL 100 ML: 350 INJECTION, SOLUTION INTRAVENOUS at 14:49

## 2019-07-08 ENCOUNTER — APPOINTMENT (OUTPATIENT)
Dept: RADIOLOGY | Facility: MEDICAL CENTER | Age: 29
End: 2019-07-08
Payer: MEDICARE

## 2019-07-08 ENCOUNTER — HOSPITAL ENCOUNTER (OUTPATIENT)
Dept: LAB | Facility: MEDICAL CENTER | Age: 29
End: 2019-07-08
Attending: NURSE PRACTITIONER
Payer: MEDICARE

## 2019-07-08 ENCOUNTER — HOSPITAL ENCOUNTER (EMERGENCY)
Facility: MEDICAL CENTER | Age: 29
End: 2019-07-08
Attending: EMERGENCY MEDICINE
Payer: MEDICARE

## 2019-07-08 VITALS
SYSTOLIC BLOOD PRESSURE: 124 MMHG | OXYGEN SATURATION: 95 % | HEIGHT: 65 IN | WEIGHT: 282.63 LBS | HEART RATE: 75 BPM | BODY MASS INDEX: 47.09 KG/M2 | TEMPERATURE: 97.3 F | RESPIRATION RATE: 20 BRPM | DIASTOLIC BLOOD PRESSURE: 74 MMHG

## 2019-07-08 DIAGNOSIS — R07.9 CHEST PAIN, UNSPECIFIED TYPE: ICD-10-CM

## 2019-07-08 LAB
25(OH)D3 SERPL-MCNC: 13 NG/ML (ref 30–100)
ALBUMIN SERPL BCP-MCNC: 4.1 G/DL (ref 3.2–4.9)
ALBUMIN SERPL BCP-MCNC: 4.2 G/DL (ref 3.2–4.9)
ALBUMIN/GLOB SERPL: 1.6 G/DL
ALBUMIN/GLOB SERPL: 2 G/DL
ALP SERPL-CCNC: 31 U/L (ref 30–99)
ALP SERPL-CCNC: 32 U/L (ref 30–99)
ALT SERPL-CCNC: 54 U/L (ref 2–50)
ALT SERPL-CCNC: 68 U/L (ref 2–50)
ANION GAP SERPL CALC-SCNC: 12 MMOL/L (ref 0–11.9)
ANION GAP SERPL CALC-SCNC: 13 MMOL/L (ref 0–11.9)
ANISOCYTOSIS BLD QL SMEAR: ABNORMAL
APPEARANCE UR: CLEAR
AST SERPL-CCNC: 15 U/L (ref 12–45)
AST SERPL-CCNC: 33 U/L (ref 12–45)
BASOPHILS # BLD AUTO: 0 % (ref 0–1.8)
BASOPHILS # BLD AUTO: 0.1 % (ref 0–1.8)
BASOPHILS # BLD: 0 K/UL (ref 0–0.12)
BASOPHILS # BLD: 0.01 K/UL (ref 0–0.12)
BILIRUB SERPL-MCNC: 0.9 MG/DL (ref 0.1–1.5)
BILIRUB SERPL-MCNC: 1.2 MG/DL (ref 0.1–1.5)
BILIRUB UR QL STRIP.AUTO: NEGATIVE
BUN SERPL-MCNC: 23 MG/DL (ref 8–22)
BUN SERPL-MCNC: 25 MG/DL (ref 8–22)
CALCIUM SERPL-MCNC: 8.7 MG/DL (ref 8.4–10.2)
CALCIUM SERPL-MCNC: 9.7 MG/DL (ref 8.5–10.5)
CHLORIDE SERPL-SCNC: 104 MMOL/L (ref 96–112)
CHLORIDE SERPL-SCNC: 106 MMOL/L (ref 96–112)
CO2 SERPL-SCNC: 22 MMOL/L (ref 20–33)
CO2 SERPL-SCNC: 23 MMOL/L (ref 20–33)
COLOR UR: YELLOW
CREAT SERPL-MCNC: 0.83 MG/DL (ref 0.5–1.4)
CREAT SERPL-MCNC: 0.88 MG/DL (ref 0.5–1.4)
CREAT UR-MCNC: 129 MG/DL
D DIMER PPP IA.FEU-MCNC: <0.4 UG/ML (FEU) (ref 0–0.5)
EKG IMPRESSION: NORMAL
EOSINOPHIL # BLD AUTO: 0 K/UL (ref 0–0.51)
EOSINOPHIL # BLD AUTO: 0.08 K/UL (ref 0–0.51)
EOSINOPHIL NFR BLD: 0 % (ref 0–6.9)
EOSINOPHIL NFR BLD: 0.8 % (ref 0–6.9)
ERYTHROCYTE [DISTWIDTH] IN BLOOD BY AUTOMATED COUNT: 46.7 FL (ref 35.9–50)
ERYTHROCYTE [DISTWIDTH] IN BLOOD BY AUTOMATED COUNT: 49.5 FL (ref 35.9–50)
GLOBULIN SER CALC-MCNC: 2.1 G/DL (ref 1.9–3.5)
GLOBULIN SER CALC-MCNC: 2.6 G/DL (ref 1.9–3.5)
GLUCOSE SERPL-MCNC: 132 MG/DL (ref 65–99)
GLUCOSE SERPL-MCNC: 90 MG/DL (ref 65–99)
GLUCOSE UR STRIP.AUTO-MCNC: NEGATIVE MG/DL
HCG SERPL QL: NEGATIVE
HCT VFR BLD AUTO: 40.7 % (ref 37–47)
HCT VFR BLD AUTO: 43.5 % (ref 37–47)
HGB BLD-MCNC: 13.7 G/DL (ref 12–16)
HGB BLD-MCNC: 13.8 G/DL (ref 12–16)
IMM GRANULOCYTES # BLD AUTO: 0.11 K/UL (ref 0–0.11)
IMM GRANULOCYTES NFR BLD AUTO: 1.1 % (ref 0–0.9)
KETONES UR STRIP.AUTO-MCNC: NEGATIVE MG/DL
LEUKOCYTE ESTERASE UR QL STRIP.AUTO: NEGATIVE
LYMPHOCYTES # BLD AUTO: 1.11 K/UL (ref 1–4.8)
LYMPHOCYTES # BLD AUTO: 4.52 K/UL (ref 1–4.8)
LYMPHOCYTES NFR BLD: 11 % (ref 22–41)
LYMPHOCYTES NFR BLD: 45.7 % (ref 22–41)
MAGNESIUM SERPL-MCNC: 2.1 MG/DL (ref 1.5–2.5)
MANUAL DIFF BLD: NORMAL
MCH RBC QN AUTO: 30.2 PG (ref 27–33)
MCH RBC QN AUTO: 31 PG (ref 27–33)
MCHC RBC AUTO-ENTMCNC: 31.7 G/DL (ref 33.6–35)
MCHC RBC AUTO-ENTMCNC: 33.7 G/DL (ref 33.6–35)
MCV RBC AUTO: 92.1 FL (ref 81.4–97.8)
MCV RBC AUTO: 95.2 FL (ref 81.4–97.8)
MICRO URNS: NORMAL
MONOCYTES # BLD AUTO: 0.1 K/UL (ref 0–0.85)
MONOCYTES # BLD AUTO: 0.48 K/UL (ref 0–0.85)
MONOCYTES NFR BLD AUTO: 1 % (ref 0–13.4)
MONOCYTES NFR BLD AUTO: 4.9 % (ref 0–13.4)
NEUTROPHILS # BLD AUTO: 4.69 K/UL (ref 2–7.15)
NEUTROPHILS # BLD AUTO: 8.89 K/UL (ref 2–7.15)
NEUTROPHILS NFR BLD: 47.4 % (ref 44–72)
NEUTROPHILS NFR BLD: 87 % (ref 44–72)
NEUTS BAND NFR BLD MANUAL: 1 % (ref 0–10)
NITRITE UR QL STRIP.AUTO: NEGATIVE
NRBC # BLD AUTO: 0 K/UL
NRBC # BLD AUTO: 0 K/UL
NRBC BLD-RTO: 0 /100 WBC
NRBC BLD-RTO: 0 /100 WBC
PH UR STRIP.AUTO: 6 [PH]
PHOSPHATE SERPL-MCNC: 3.4 MG/DL (ref 2.5–4.5)
PLATELET # BLD AUTO: 193 K/UL (ref 164–446)
PLATELET # BLD AUTO: 194 K/UL (ref 164–446)
PLATELET BLD QL SMEAR: NORMAL
PMV BLD AUTO: 10.9 FL (ref 9–12.9)
PMV BLD AUTO: 11 FL (ref 9–12.9)
POTASSIUM SERPL-SCNC: 3.8 MMOL/L (ref 3.6–5.5)
POTASSIUM SERPL-SCNC: 4.5 MMOL/L (ref 3.6–5.5)
PROT SERPL-MCNC: 6.3 G/DL (ref 6–8.2)
PROT SERPL-MCNC: 6.7 G/DL (ref 6–8.2)
PROT UR QL STRIP: NEGATIVE MG/DL
PROT UR-MCNC: 8.2 MG/DL (ref 0–15)
PROT/CREAT UR: 64 MG/G (ref 10–107)
PTH-INTACT SERPL-MCNC: 77.7 PG/ML (ref 14–72)
RBC # BLD AUTO: 4.42 M/UL (ref 4.2–5.4)
RBC # BLD AUTO: 4.57 M/UL (ref 4.2–5.4)
RBC BLD AUTO: PRESENT
RBC UR QL AUTO: NEGATIVE
SODIUM SERPL-SCNC: 139 MMOL/L (ref 135–145)
SODIUM SERPL-SCNC: 141 MMOL/L (ref 135–145)
SP GR UR STRIP.AUTO: 1.02
TROPONIN I SERPL-MCNC: <0.02 NG/ML (ref 0–0.04)
TROPONIN I SERPL-MCNC: <0.02 NG/ML (ref 0–0.04)
URATE SERPL-MCNC: 5.9 MG/DL (ref 1.9–8.2)
UROBILINOGEN UR STRIP.AUTO-MCNC: 0.2 MG/DL
WBC # BLD AUTO: 10.1 K/UL (ref 4.8–10.8)
WBC # BLD AUTO: 9.9 K/UL (ref 4.8–10.8)

## 2019-07-08 PROCEDURE — 93005 ELECTROCARDIOGRAM TRACING: CPT

## 2019-07-08 PROCEDURE — 84484 ASSAY OF TROPONIN QUANT: CPT

## 2019-07-08 PROCEDURE — 84703 CHORIONIC GONADOTROPIN ASSAY: CPT

## 2019-07-08 PROCEDURE — 82306 VITAMIN D 25 HYDROXY: CPT

## 2019-07-08 PROCEDURE — 71045 X-RAY EXAM CHEST 1 VIEW: CPT

## 2019-07-08 PROCEDURE — 85025 COMPLETE CBC W/AUTO DIFF WBC: CPT

## 2019-07-08 PROCEDURE — 700102 HCHG RX REV CODE 250 W/ 637 OVERRIDE(OP): Performed by: EMERGENCY MEDICINE

## 2019-07-08 PROCEDURE — 96374 THER/PROPH/DIAG INJ IV PUSH: CPT

## 2019-07-08 PROCEDURE — 84100 ASSAY OF PHOSPHORUS: CPT

## 2019-07-08 PROCEDURE — 80053 COMPREHEN METABOLIC PANEL: CPT | Mod: 91

## 2019-07-08 PROCEDURE — 84550 ASSAY OF BLOOD/URIC ACID: CPT

## 2019-07-08 PROCEDURE — 36415 COLL VENOUS BLD VENIPUNCTURE: CPT

## 2019-07-08 PROCEDURE — A9270 NON-COVERED ITEM OR SERVICE: HCPCS | Performed by: EMERGENCY MEDICINE

## 2019-07-08 PROCEDURE — 99285 EMERGENCY DEPT VISIT HI MDM: CPT

## 2019-07-08 PROCEDURE — 81003 URINALYSIS AUTO W/O SCOPE: CPT

## 2019-07-08 PROCEDURE — 83970 ASSAY OF PARATHORMONE: CPT

## 2019-07-08 PROCEDURE — 93005 ELECTROCARDIOGRAM TRACING: CPT | Performed by: EMERGENCY MEDICINE

## 2019-07-08 PROCEDURE — 84156 ASSAY OF PROTEIN URINE: CPT

## 2019-07-08 PROCEDURE — 82570 ASSAY OF URINE CREATININE: CPT

## 2019-07-08 PROCEDURE — 85007 BL SMEAR W/DIFF WBC COUNT: CPT

## 2019-07-08 PROCEDURE — 80053 COMPREHEN METABOLIC PANEL: CPT

## 2019-07-08 PROCEDURE — 83735 ASSAY OF MAGNESIUM: CPT

## 2019-07-08 PROCEDURE — 700111 HCHG RX REV CODE 636 W/ 250 OVERRIDE (IP): Performed by: EMERGENCY MEDICINE

## 2019-07-08 PROCEDURE — 85027 COMPLETE CBC AUTOMATED: CPT

## 2019-07-08 PROCEDURE — 85379 FIBRIN DEGRADATION QUANT: CPT

## 2019-07-08 RX ORDER — CIPROFLOXACIN HYDROCHLORIDE 3.5 MG/ML
1 SOLUTION/ DROPS TOPICAL
Status: SHIPPED | COMMUNITY
End: 2019-07-08

## 2019-07-08 RX ORDER — ONDANSETRON 2 MG/ML
4 INJECTION INTRAMUSCULAR; INTRAVENOUS ONCE
Status: COMPLETED | OUTPATIENT
Start: 2019-07-08 | End: 2019-07-08

## 2019-07-08 RX ORDER — LORAZEPAM 1 MG/1
1 TABLET ORAL ONCE
Status: COMPLETED | OUTPATIENT
Start: 2019-07-08 | End: 2019-07-08

## 2019-07-08 RX ORDER — RANITIDINE 300 MG/1
300 TABLET ORAL DAILY
Qty: 60 TAB | Refills: 3 | Status: SHIPPED | OUTPATIENT
Start: 2019-07-08 | End: 2019-09-25

## 2019-07-08 RX ORDER — FLUOXETINE HYDROCHLORIDE 20 MG/1
20 CAPSULE ORAL EVERY EVENING
Status: SHIPPED | COMMUNITY
End: 2019-08-07

## 2019-07-08 RX ORDER — PREDNISONE 10 MG/1
40 TABLET ORAL DAILY
Status: SHIPPED | COMMUNITY
Start: 2019-07-03 | End: 2019-08-14

## 2019-07-08 RX ADMIN — ONDANSETRON 4 MG: 2 INJECTION INTRAMUSCULAR; INTRAVENOUS at 17:16

## 2019-07-08 RX ADMIN — LORAZEPAM 1 MG: 1 TABLET ORAL at 17:16

## 2019-07-08 NOTE — ED NOTES
Pt reports she feels SOB after walking to [place], O2 sat 90%, given 3L O2. Pt reports she wears O2 at home and is supposed to have it on all the time

## 2019-07-08 NOTE — ED PROVIDER NOTES
ED Provider Note    ED Provider Note    Primary care provider: Torres Brody M.D.  Means of arrival: Walk-in  History obtained from: Patient    CHIEF COMPLAINT  Chief Complaint   Patient presents with   • Chest Pain     Reports HR in the 130's since Friday. CP described as sharp and pressure, constant. Pt reports diaphoresis, CP, SOB and dizziness. Hx ablation in 2017. Pt has hx optic neuritis for which she takes Methotrexate, just started on July 1st. Sent by Dr. Cabral     Seen at 4:17 PM.   HPI  Kristin Balderrama is a 28 y.o. female who presents to the Emergency Department left-sided nonradiating chest pressure and sharp stabbing pain that is worse with exertion for the past 72 hours.  Patient had similar pain when she was an inpatient last week, she had a negative rule out at that time.  She felt that she could not wait the pain out and presents for evaluation.  She felt that her heart was racing for the past several days with a heart rate in the 130s.  She feels that she was able to control it today and notes the tachycardia has since resolved.  She feels it may be related to the high dose steroids she was receiving or possibly the methotrexate that was started for her optic neuritis.    She did not see Dr. Marte but called him regarding the symptoms and he directed her to the emergency department.  She notes some dyspnea on exertion, shortness of breath at rest, mild nonproductive cough and chest tightness.  She notes bilateral eye pain and some slight decreased vision as well as photophobia.  This overall is unchanged since she was in the hospital and may be slightly improving.  She has had nausea without vomiting.  She denies any numbness or weakness.  She denies any back pain.  She has a history of a foot drop on the right lower leg, unchanged.    REVIEW OF SYSTEMS  See HPI,   Remainder of ROS negative.     PAST MEDICAL HISTORY   has a past medical history of Abdominal pain; Anginal syndrome;  Apnea, sleep; Arrhythmia; Arthritis; ASTHMA; Atrial fibrillation (HCC); Back pain; Borderline personality disorder ([facility]); Breath shortness; Bronchitis; Cardiac arrhythmia; Chickenpox; Chronic UTI (9/18/2010); Cough; Daytime sleepiness; Depression; Diabetes ([facility]); Diarrhea; Disorder of thyroid; Fall; Fatigue; Frequent headaches; Gasping for breath; Gynecological disorder; Headache(784.0); Heart burn; History of falling; Hypertension; Indigestion; Migraine; 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[facility]); 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    SURGICAL HISTORY   has a past surgical history that includes neuro dest facet l/s w/ig sngl (4/21/2015); recovery (1/27/2016); delmar by laparoscopy (8/29/2010); lumbar fusion anterior (8/21/2012); other cardiac surgery (1/2016); tonsillectomy; bowel stimulator insertion (7/13/2016); gastroscopy with balloon dilatation (N/A, 1/4/2017); muscle biopsy (Right, 1/26/2017); other abdominal surgery; and laminotomy.    SOCIAL HISTORY  Social History   Substance Use Topics   • Smoking status: Never Smoker   • Smokeless tobacco: Never Used   • Alcohol use No      History   Drug Use   • Frequency: 7.0 times per week   • Types: Marijuana, Oral     Comment: Medicinal edible's       FAMILY HISTORY  Family History   Problem Relation Age of Onset   • Hypertension Mother    • Sleep Apnea Mother    • Heart Disease Mother    • Other Mother         hypothryod   • Hypertension Maternal Uncle    • Heart Disease Maternal Grandmother    • Hypertension Maternal Grandmother    • No Known Problems Sister    • Other Sister         Narcolepsy;fibromyalsia;bone;nerve   • Genitourinary ()  "Sister         endometriosis       CURRENT MEDICATIONS  Reviewed.  See Encounter Summary.     ALLERGIES  Allergies   Allergen Reactions   • Cefdinir Shortness of Breath and Itching     Tolerated 1/18/17  Tolerates ceftriaxone    • Depakote [Divalproex Sodium] Unspecified     Muscle spasms/muscle pain and weakness     • Doxycycline Anaphylaxis and Vomiting     RXN=unknown   • Abilify Unspecified     Headaches/muscle twitching     • Amitriptyline Unspecified     Headaches     • Amoxicillin Rash     Pt states \"I get a rash\".     • Ciprofloxacin Rash     Pt states \"I get a rash\".     • Clindamycin Nausea     Even with food     • Ees [Erythromycin] Vomiting and Nausea   • Flagyl [Metronidazole Hcl] Unspecified     \"eye problems\"     • Flomax [Tamsulosin Hydrochloride] Swelling   • Metformin Unspecified     Increased lactic acid      • Tape Rash     Tears skin off  coban with Tegaderm tape ok intermittently  RXN=ongoing   • Vancomycin Itching     Pt becomes flushed in face and chest.   RXN=7/10/16   • Wound Dressing Adhesive Hives     By pt report   • Cephalexin [Keflex] Rash     Pt states she gets a rash with this medication  Tolerates ceftriaxone   • Levofloxacin Unspecified     Leg muscle cramps   • Metronidazole Rash     .   • Valproic Acid Rash     .       PHYSICAL EXAM  VITAL SIGNS: /74   Pulse 75   Temp 36.3 °C (97.3 °F) (Temporal)   Resp 20   Ht 1.651 m (5' 5\")   Wt (!) 128.2 kg (282 lb 10.1 oz)   LMP 06/28/2019   SpO2 95%   BMI 47.03 kg/m²   Constitutional: Awake, alert in no apparent distress.  Obese, appears older than stated age.  Slight tachypneic.  HENT: Normocephalic, Bilateral external ears normal. Nose normal.   Eyes: Conjunctiva normal, non-icteric, EOMI.    Thorax & Lungs: Clear to auscultation, slight tachypnea.    Cardiovascular: Regular rate, Regular rhythm, No murmurs, rubs or gallops.  Abdomen:  Soft, nontender, nondistended, normal active bowel sounds.   :    Skin: Visualized skin " is  Dry, No erythema, No rash.   Musculoskeletal:   No cyanosis, clubbing or edema.  Neurologic: Alert, Grossly non-focal.   Psychiatric: Normal affect, Normal mood  Lymphatic:  No cervical LAD    EKG   12 lead Interpreted by me  Rhythm:  Normal sinus rhythm   Rate: 85  Axis: normal  Ectopy: none  Conduction: normal  ST Segments: no acute change  T Waves: no acute change  Clinical Impression: Some motion artifact, otherwise unremarkable EKG without acute changes.    RADIOLOGY  DX-CHEST-PORTABLE (1 VIEW)   Final Result      No radiographic evidence of acute cardiopulmonary process.            COURSE & MEDICAL DECISION MAKING  Pertinent Labs & Imaging studies reviewed. (See chart for details)    Differential diagnoses include but are not limited to: Atypical chest pain, less likely pulmonary embolus, acute coronary syndrome.    4:17 PM - Medical record reviewed, patient seen and examined at bedside.  Patient has a history of schizoaffective disorder, optic neuritis.  She had laboratory evaluation today that was unremarkable.  She has been started on methotrexate on last hospitalization due to optic neuritis.    6 PM  -patient feels improved and would like to go home.    Decision Making:  This is a 28 y.o. year old female who presents with several days of left-sided chest pain.  She reports a tachycardia at home that was not appreciated on arrival.  She is not pregnant negative as her respiratory rate is slightly elevated and she has an oxygen demand (though this is her baseline O2 demand.)  D-dimer is negative, the patient is low well's criteria so this rules out acute pulmonary embolus.  Troponin is undetectable x2.  EKG is unremarkable.  At this time acute coronary syndrome has been ruled out.  Overall the patient is fairly low risk, her heart score is extremely low.  I recommend she follow-up with her primary care physician for additional management.  She was recently on some high-dose steroids, so it is possible  this is a gastritis/peptic ulcer disease.  For this reason I will place her on an H2 blocker.    Discharge Medications:  Discharge Medication List as of 7/8/2019  6:44 PM      START taking these medications    Details   ranitidine (ZANTAC) 300 MG tablet Take 1 Tab by mouth every day., Disp-60 Tab, R-3, Normal             The patient was discharged home (see d/c instructions) and parent was told to return immediately for any signs or symptoms listed, or any worsening at all.  The patient's parent verbally agreed to the discharge precautions and follow-up plan which is documented in EPIC.    The patient's blood pressure is elevated today. >120/80. I have referred them to primary care for follow up.       FINAL IMPRESSION  1. Chest pain, unspecified type

## 2019-07-09 ENCOUNTER — TELEPHONE (OUTPATIENT)
Dept: PHYSICAL THERAPY | Facility: REHABILITATION | Age: 29
End: 2019-07-09

## 2019-07-09 ENCOUNTER — APPOINTMENT (OUTPATIENT)
Dept: PHYSICAL THERAPY | Facility: REHABILITATION | Age: 29
End: 2019-07-09
Attending: INTERNAL MEDICINE
Payer: MEDICARE

## 2019-07-10 ENCOUNTER — NON-PROVIDER VISIT (OUTPATIENT)
Dept: WOUND CARE | Facility: MEDICAL CENTER | Age: 29
End: 2019-07-10
Attending: INTERNAL MEDICINE
Payer: MEDICARE

## 2019-07-10 ENCOUNTER — TELEPHONE (OUTPATIENT)
Dept: CARDIOLOGY | Facility: MEDICAL CENTER | Age: 29
End: 2019-07-10

## 2019-07-10 ENCOUNTER — OFFICE VISIT (OUTPATIENT)
Dept: CARDIOLOGY | Facility: MEDICAL CENTER | Age: 29
End: 2019-07-10
Payer: MEDICARE

## 2019-07-10 VITALS
OXYGEN SATURATION: 95 % | DIASTOLIC BLOOD PRESSURE: 90 MMHG | HEART RATE: 79 BPM | BODY MASS INDEX: 46.15 KG/M2 | WEIGHT: 277 LBS | HEIGHT: 65 IN | SYSTOLIC BLOOD PRESSURE: 124 MMHG

## 2019-07-10 DIAGNOSIS — I47.11 INAPPROPRIATE SINUS TACHYCARDIA: ICD-10-CM

## 2019-07-10 DIAGNOSIS — R06.00 DYSPNEA, UNSPECIFIED TYPE: ICD-10-CM

## 2019-07-10 DIAGNOSIS — R00.0 TACHYCARDIA: Primary | ICD-10-CM

## 2019-07-10 DIAGNOSIS — R00.0 SINUS TACHYCARDIA: Chronic | ICD-10-CM

## 2019-07-10 PROCEDURE — 99214 OFFICE O/P EST MOD 30 MIN: CPT | Performed by: NURSE PRACTITIONER

## 2019-07-10 PROCEDURE — 99211 OFF/OP EST MAY X REQ PHY/QHP: CPT

## 2019-07-10 PROCEDURE — 93000 ELECTROCARDIOGRAM COMPLETE: CPT | Performed by: INTERNAL MEDICINE

## 2019-07-10 RX ORDER — GABAPENTIN 300 MG/1
300 CAPSULE ORAL 4 TIMES DAILY
Status: ON HOLD | COMMUNITY
End: 2019-12-31 | Stop reason: SDUPTHER

## 2019-07-10 ASSESSMENT — ENCOUNTER SYMPTOMS
DIZZINESS: 0
TREMORS: 0
VOMITING: 0
STRIDOR: 0
DOUBLE VISION: 0
LOSS OF CONSCIOUSNESS: 0
WEIGHT LOSS: 0
COUGH: 0
TINGLING: 0
SPUTUM PRODUCTION: 0
SORE THROAT: 0
DIARRHEA: 0
FEVER: 0
SPEECH CHANGE: 0
SENSORY CHANGE: 0
CHILLS: 0
SHORTNESS OF BREATH: 1
ORTHOPNEA: 0
HEADACHES: 0
WEAKNESS: 1
PALPITATIONS: 1
PND: 0
HEARTBURN: 0
NAUSEA: 0
BLURRED VISION: 0
BLOOD IN STOOL: 0
ABDOMINAL PAIN: 0
WHEEZING: 0
FOCAL WEAKNESS: 0
HEMOPTYSIS: 0

## 2019-07-10 NOTE — CERTIFICATION
[facility]   [facility]   [address]   [address]   [place] [address]   [phone] Fax: [phone]    Discharge Note      Referring Physician: Hari Bowman - [facility] Hospitalist: will send certification to PCP  Wound Etiology: Breast wounds resolved today  Wound location: Bilateral breasts - no open wounds  ICD-10: S12.009A Breast wound, unspecified laterality, initial encounter  Date of Discharge:    Assessment:  Discharge patient at this time secondary to wound resolution. Patient states that when she was seen in the hospital by inpatient wound care team, they advised her to not take off the bandages until she was seen today for initial evaluation. Wounds are resolved today.     Pt instr dc today, keep area clean, it will be fragile for a few days, bathe and dry area gently, as scar tissue only ever regains a maximum of 80% of the tensile strength of the surrounding skin, remodeling of scar can continue for 6mo - a year. Contact PCP for a referral back here if any problems with area opening and draining again. Pt understands.    Thank you for the referral and the opportunity to treat your patient.      Clinician Signature: _____________________________ Date:_______________

## 2019-07-10 NOTE — OP THERAPY PROGRESS SUMMARY
Dr. Brody,      Patient was present to resume PT today, but due to recent hospitalization will need medical clearance. Is it ok for patient to resume PT services? Any additional precautions?     Thank you,   Emmy Cespedes, PT, DPT

## 2019-07-10 NOTE — PROGRESS NOTES
"Cardiology/Electrophysiology Follow-up Note      Subjective:   Chief Complaint:   Chief Complaint   Patient presents with   • Tachycardia     FV       Kristin Balderrama is a 28 y.o. female who presents today for follow up and reivew of her cardiac status.    She is followed by Dr. Ch, a previous patient of Dr. Kumar.  Past medical history also significant for IAST treated with corlanor, pervious ablation for sinus node modification TONYA, T2DM, schizophrenia/depression.      Today in follow up she states that she has had recent episodes of tachycardia, chest discomfort.  States that it started after starting methotrexate.  Previously tachycardia well controled with Corlanor 7.5 mg BID.  She was seen in the ED earlier this week for the above problems, heart rate was normal when she went to the ED.  She had workup wich was negative and stable from previous hospitalization/workup.  She denies dizziness, pre syncope or syncope, PND, orthopnea, or lower extremity edema.  She has chronic dyspnea and is oxygen dependent, but states dyspnea slightly worse as well.  She has neative d-dimer in the ER and CT was negatvie for PE.     She is concerned that she may be having side effects to methotrexate.       Patient endorses medication compliance.           Past Medical History:   Diagnosis Date   • Abdominal pain    • Anginal syndrome     random chest pain especially with tachycardia   • Apnea, sleep    • Arrhythmia     \"sinus tachycardia\", cariologist, Dr. Kumar; ablation 2/2016   • Arthritis     osteo   • ASTHMA     when around smoke   • Atrial fibrillation (HCC)    • Back pain    • Borderline personality disorder (HCC)    • Breath shortness     with tachycardia   • Bronchitis    • Cardiac arrhythmia    • Chickenpox    • Chronic UTI 9/18/2010   • Cough    • Daytime sleepiness    • Depression    • Diabetes (HCC)    • Diarrhea    • Disorder of thyroid    • Fall    • Fatigue    • Frequent headaches    • Gasping for " "breath    • Gynecological disorder     PCOS   • Headache(784.0)    • Heart burn    • History of falling    • Hypertension    • Indigestion    • Migraine    • 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HCC)    • 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HCC)    • Nausea    • Obesity    • Pain 08-15-12    back, 7/10   • Painful joint    • PCOS (polycystic ovarian syndrome)    • Pneumonia 2012   • Psychosis (HCC)    • Renal disorder     \"kidney disease, stage 1\" nephrologist, Dr. 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   • Urinary bladder disorder     Suprapubic cath   • Urinary incontinence    • Weakness    • Wears glasses      Past Surgical History:   Procedure Laterality Date   • MUSCLE BIOPSY Right 1/26/2017    Procedure: MUSCLE BIOPSY - THIGH;  Surgeon: Isidro Vigil M.D.;  Location: [facility];  Service:    • GASTROSCOPY WITH BALLOON DILATATION N/A 1/4/2017    Procedure: GASTROSCOPY WITH DILATATION;  Surgeon: Torres Vargas M.D.;  Location: [facility];  Service:    • BOWEL STIMULATOR INSERTION  7/13/2016    Procedure: BOWEL STIMULATOR INSERTION FOR PERMANENT INTERSTIM SACRAL IMPLANT;  Surgeon: Joe Noyola M.D.;  Location: [facility];  Service:    • RECOVERY  1/27/2016    Procedure: CATH LAB EP STUDY WITH SINUS NODE MODIFICATION LA;  Surgeon: Recoveryonly Surgery;  [place] [facility] [place]  Service:    • OTHER CARDIAC SURGERY  1/2016    cardiac ablation   • NEURO DEST FACET L/S W/IG SNGL  4/21/2015    Performed by Reza Tabor at [facility]   • LUMBAR FUSION ANTERIOR  8/21/2012    Performed by SUSIE SAWANT at [facility]   • ALYSSA BY LAPAROSCOPY  8/29/2010    Performed by SHAYY JOHNS at [facility]   • LAMINOTOMY   " "  • OTHER ABDOMINAL SURGERY     • TONSILLECTOMY      tonsillectomy     Family History   Problem Relation Age of Onset   • Hypertension Mother    • Sleep Apnea Mother    • Heart Disease Mother    • Other Mother         hypothryod   • Hypertension Maternal Uncle    • Heart Disease Maternal Grandmother    • Hypertension Maternal Grandmother    • No Known Problems Sister    • Other Sister         Narcolepsy;fibromyalsia;bone;nerve   • Genitourinary () Sister         endometriosis     Social History     Social History   • Marital status: Single     Spouse name: N/A   • Number of children: N/A   • Years of education: N/A     Occupational History   • Not on file.     Social History Main Topics   • Smoking status: Never Smoker   • Smokeless tobacco: Never Used   • Alcohol use No   • Drug use: Yes     Frequency: 7.0 times per week     Types: Marijuana, Oral      Comment: Medicinal edible's   • Sexual activity: Not Currently     Birth control/ protection: Pill     Other Topics Concern   • Not on file     Social History Narrative    ** Merged History Encounter **          Allergies   Allergen Reactions   • Cefdinir Shortness of Breath and Itching     Tolerated 1/18/17  Tolerates ceftriaxone    • Depakote [Divalproex Sodium] Unspecified     Muscle spasms/muscle pain and weakness     • Doxycycline Anaphylaxis and Vomiting     RXN=unknown   • Abilify Unspecified     Headaches/muscle twitching     • Amitriptyline Unspecified     Headaches     • Amoxicillin Rash     Pt states \"I get a rash\".     • Ciprofloxacin Rash     Pt states \"I get a rash\".     • Clindamycin Nausea     Even with food     • Ees [Erythromycin] Vomiting and Nausea   • Flagyl [Metronidazole Hcl] Unspecified     \"eye problems\"     • Flomax [Tamsulosin Hydrochloride] Swelling   • Metformin Unspecified     Increased lactic acid      • Tape Rash     Tears skin off  coban with Tegaderm tape ok intermittently  RXN=ongoing   • Vancomycin Itching     Pt becomes flushed " in face and chest.   RXN=7/10/16   • Wound Dressing Adhesive Hives     By pt report   • Cephalexin [Keflex] Rash     Pt states she gets a rash with this medication  Tolerates ceftriaxone   • Levofloxacin Unspecified     Leg muscle cramps   • Metronidazole Rash     .   • Valproic Acid Rash     .       Current Outpatient Prescriptions   Medication Sig Dispense Refill   • Diclofenac Sodium 1 % Gel Apply 1 Application to skin as directed 4 times a day. Apply's on wrist, back, ankles, and feet.     • Dulaglutide (TRULICITY) 0.75 MG/0.5ML Solution Pen-injector Inject 0.75 mg as instructed every 7 days. On Friday     • FLUoxetine (PROZAC) 20 MG Cap Take 20 mg by mouth every evening.     • methotrexate 2.5 MG Tab Take 7.5 mg by mouth every 7 days. On Sunday     • predniSONE (DELTASONE) 10 MG Tab Take 10 mg by mouth every day. Pt started on 7/3/2019  X 3 weeks then 25 mg daily x 3 weeks then 20 mg x 3 weeks then 15 mg daily as prescribed by Dr. [facility].     • CIPROFLOXACIN HCL OT Place 5 Drops in right ear 2 Times a Day. Pt started on 7/1/2019 for 5 day course.     • ranitidine (ZANTAC) 300 MG tablet Take 1 Tab by mouth every day. 60 Tab 3   • ondansetron (ZOFRAN) 4 MG Tab tablet Take 1 Tab by mouth every four hours as needed for Nausea/Vomiting. 20 Tab 3   • folic acid (FOLVITE) 1 MG Tab Take 1 Tab by mouth every day. 30 Tab 2   • ivabradine (CORLANOR) 5 MG Tab tablet Take 1.5 Tabs by mouth 2 times a day, with meals. 60 Tab 1   • sodium bicarbonate 325 MG Tab Take 325-650 mg by mouth 3 times a day. 650 mg in AM  325 mg mid-afternoon  650 mg at night     • albuterol 108 (90 Base) MCG/ACT Aero Soln inhalation aerosol Inhale 2 Puffs by mouth every 6 hours as needed for Shortness of Breath.     • Melatonin 5 MG Tab Take 10 mg by mouth every day.     • furosemide (LASIX) 80 MG Tab Take 80 mg by mouth every day.     • busPIRone (BUSPAR) 5 MG tablet TAKE ONE TABLET BY MOUTH TWICE DAILY 60 Tab 4   • phenazopyridine (PYRIDIUM) 200  "MG Tab Take 1 Tab by mouth 3 times a day as needed. 30 Tab 2   • vitamin D, Ergocalciferol, (DRISDOL) 12724 units Cap capsule Take 50,000 Units by mouth every Friday.     • norethindrone (DEBLITANE) 0.35 MG tablet Take 0.35 mg by mouth every day.     • ziprasidone (GEODON) 80 MG Cap Take 80 mg by mouth 2 Times a Day.     • methocarbamol (ROBAXIN) 750 MG Tab Take 750 mg by mouth 3 times a day.     • traZODone (DESYREL) 100 MG Tab TAKE  ONE TABLET BY MOUTH NIGHTLY AT BEDTIME 90 Tab 2   • Diphenhydramine-APAP, sleep, (TYLENOL PM EXTRA STRENGTH PO) Take 2 Caps by mouth every day.     • LYRICA 300 MG capsule Take 300 mg by mouth 2 times a day.     • aspirin EC (ECOTRIN) 81 MG Tablet Delayed Response Take 1 Tab by mouth every day. 30 Tab 6     No current facility-administered medications for this visit.        Review of Systems   Constitutional: Positive for malaise/fatigue. Negative for chills, fever and weight loss.   HENT: Negative for congestion, nosebleeds, sore throat and tinnitus.    Eyes: Negative for blurred vision and double vision.   Respiratory: Positive for shortness of breath (chronic- on home 02). Negative for cough, hemoptysis, sputum production, wheezing and stridor.    Cardiovascular: Positive for chest pain (States mild chest discomfort remains ) and palpitations. Negative for orthopnea, leg swelling and PND.   Gastrointestinal: Negative for abdominal pain, blood in stool, diarrhea, heartburn, melena, nausea and vomiting.   Skin: Negative for rash.   Neurological: Positive for weakness. Negative for dizziness, tingling, tremors, sensory change, speech change, focal weakness, loss of consciousness and headaches.     All others systems reviewed and negative.     Objective:     /90 (BP Location: Left arm, Patient Position: Sitting, BP Cuff Size: Adult)   Pulse 79   Ht 1.651 m (5' 5\")   Wt (!) 125.6 kg (277 lb)   SpO2 95%  Body mass index is 46.1 kg/m².    Physical Exam   Constitutional: She is " well-developed, well-nourished, and in no distress.   HENT:   Head: Normocephalic and atraumatic.   Eyes: Pupils are equal, round, and reactive to light. Conjunctivae and EOM are normal.   Neck: Normal range of motion. Neck supple. No JVD present.   Cardiovascular: Normal rate, regular rhythm, normal heart sounds and intact distal pulses.  Exam reveals no gallop and no friction rub.    No murmur heard.  Pulmonary/Chest: Effort normal and breath sounds normal. No respiratory distress. She has no wheezes. She has no rales. She exhibits no tenderness.   Abdominal: Soft. Bowel sounds are normal. There is no tenderness.   Musculoskeletal: She exhibits no edema.   Using walker    Neurological: She is alert. Gait normal.   Skin: Skin is warm and dry. No rash noted. No erythema.   Psychiatric: Mood, memory, affect and judgment normal.         Cardiac Imaging and Procedures Review:    EKG (7/10/19) reviewed by myself:   Sinus rhythm     Echo (3/1/17):   Left Ventricle  Contrast was used to enhance visualization of the endocardial border.   1.5 mL of contrast was administered. Normal left ventricular chamber   size. Mild concentric left ventricular hypertrophy. Normal left   ventricular systolic function. Left ventricular ejection fraction is   visually estimated to be 60%. Normal regional wall motion. Normal   diastolic function.    Right Ventricle  Normal right ventricular size and systolic function.    Right Atrium  Normal right atrial size. Vena cava is not well visualized.    Left Atrium  The left atrium is normal in size.    Mitral Valve  Structurally normal mitral valve.  Trace mitral regurgitation.    Aortic Valve  Structurally normal aortic valve. No stenosis or regurgitation seen.    Tricuspid Valve  Structurally normal tricuspid valve without significant stenosis. Trace   tricuspid regurgitation. Unable to estimate pulmonary artery pressure   due to an inadequate tricuspid regurgitant jet.    Pulmonic Valve  The  pulmonic valve is not well visualized. No stenosis or regurgitation   seen.    Pericardium  Normal pericardium without effusion.    Aorta  The aortic root is normal.    Labs (personally reviewed and notable for):   Lab Results   Component Value Date/Time    SODIUM 139 07/08/2019 03:49 PM    POTASSIUM 4.5 07/08/2019 03:49 PM    CHLORIDE 104 07/08/2019 03:49 PM    CO2 22 07/08/2019 03:49 PM    GLUCOSE 132 (H) 07/08/2019 03:49 PM    BUN 25 (H) 07/08/2019 03:49 PM    CREATININE 0.88 07/08/2019 03:49 PM         PT/INR:   Lab Results   Component Value Date/Time    PROTHROMBTM 13.3 01/31/2019 01:14 PM    INR 1.00 01/31/2019 01:14 PM       Assessment:     1. Tachycardia  EKG   2. Sinus tachycardia     3. Inappropriate sinus tachycardia         Medical Decision Making:  Today's Assessment / Status / Plan:   1. IAST:  - 12 lead EKG today in office shows sinus rhythm, rate 79.  There are no ST changes.  - I have reviewed case as well with Dr. Leon.  He recommends no additional changes at this time.  - In terms of her Methotrexate I have asked her to discuss symptoms with Dr Tee as she states started after starting medication.  She has had essentially a negative workup on recent ER visits.     - She will continue corlanor 7.5. Mg BID.    2. Dyspnea:  - Will recheck echo to evaluate effusion discussed with Dr. Yousif.     ED precautions discussed .           Plan reviewed in detail with the patient and She verbalizes understanding and is in agreement.   RTC in Oct as scheduled with Dr. Leon, sooner if clinical condition changes  Collaborating MD/ADD: PER [place].

## 2019-07-10 NOTE — LETTER
"     [facility]   [address]  [place] [address]  Phone: [phone]  Fax: [phone]              Kristin Balderrama  1990    Encounter Date: 7/10/2019    VANIA Sim          PROGRESS NOTE:  Cardiology/Electrophysiology Follow-up Note      Subjective:   Chief Complaint:   Chief Complaint   Patient presents with   • Tachycardia     FV       Kristin Balderrama is a 28 y.o. female who presents today for follow up and reivew of her cardiac status.    She is followed by Dr. Ch, a previous patient of Dr. Kumar.  Past medical history also significant for IAST treated with corlanor, pervious ablation for sinus node modification TONYA, T2DM, schizophrenia/depression.      Today in follow up she states that she has had recent episodes of tachycardia, chest discomfort.  States that it started after starting methotrexate.  Previously tachycardia well controled with Corlanor 7.5 mg BID.  She was seen in the ED earlier this week for the above problems, heart rate was normal when she went to the ED.  She had workup wich was negative and stable from previous hospitalization/workup.  She denies dizziness, pre syncope or syncope, PND, orthopnea, or lower extremity edema.  She has chronic dyspnea and is oxygen dependent, but states dyspnea slightly worse as well.  She has neative d-dimer in the ER and CT was negatvie for PE.     She is concerned that she may be having side effects to methotrexate.       Patient endorses medication compliance.           Past Medical History:   Diagnosis Date   • Abdominal pain    • Anginal syndrome     random chest pain especially with tachycardia   • Apnea, sleep    • Arrhythmia     \"sinus tachycardia\", cariologist, Dr. Kumar; ablation 2/2016   • Arthritis     osteo   • ASTHMA     when around smoke   • Atrial fibrillation (HCC)    • Back pain    • Borderline personality disorder (HCC)    • Breath shortness    " "with tachycardia   • Bronchitis    • Cardiac arrhythmia    • Chickenpox    • Chronic UTI 9/18/2010   • Cough    • Daytime sleepiness    • Depression    • Diabetes (HCC)    • Diarrhea    • Disorder of thyroid    • Fall    • Fatigue    • Frequent headaches    • Gasping for breath    • Gynecological disorder     PCOS   • Headache(784.0)    • Heart burn    • History of falling    • Hypertension    • Indigestion    • Migraine    • 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HCC)    • 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HCC)    • Nausea    • Obesity    • Pain 08-15-12    back, 7/10   • Painful joint    • PCOS (polycystic ovarian syndrome)    • Pneumonia 2012   • Psychosis (HCC)    • Renal disorder     \"kidney disease, stage 1\" nephrologist, Dr. 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   • Urinary bladder disorder     Suprapubic cath   • Urinary incontinence    • Weakness    • Wears glasses      Past Surgical History:   Procedure Laterality Date   • MUSCLE BIOPSY Right 1/26/2017    Procedure: MUSCLE BIOPSY - THIGH;  Surgeon: Isidro Vigil M.D.;  Location: [facility];  Service:    • GASTROSCOPY WITH BALLOON DILATATION N/A 1/4/2017    Procedure: GASTROSCOPY WITH DILATATION;  Surgeon: Torres Vargas M.D.;  Location: [facility];  Service:    • BOWEL STIMULATOR INSERTION  7/13/2016    Procedure: BOWEL STIMULATOR INSERTION FOR PERMANENT INTERSTIM SACRAL IMPLANT;  Surgeon: Joe Noyola M.D.;  Location: [facility];  Service:    • RECOVERY  1/27/2016    Procedure: CATH LAB EP STUDY WITH SINUS NODE MODIFICATION ABHINAV;  Surgeon: Recoveryonly Surgery;  Location: SURGERY PRE-POST PROC UNIT [facility];  Service:    • OTHER CARDIAC SURGERY  1/2016    cardiac ablation   • NEURO DEST FACET L/S W/IG SNGL  " "4/21/2015    Performed by Reza Tabor at [facility]   • LUMBAR FUSION ANTERIOR  8/21/2012    Performed by SUSIE SAWANT at [facility]   • ALYSSA BY LAPAROSCOPY  8/29/2010    Performed by SHAYY JOHNS at [facility]   • LAMINOTOMY     • OTHER ABDOMINAL SURGERY     • TONSILLECTOMY      tonsillectomy     Family History   Problem Relation Age of Onset   • Hypertension Mother    • Sleep Apnea Mother    • Heart Disease Mother    • Other Mother         hypothryod   • Hypertension Maternal Uncle    • Heart Disease Maternal Grandmother    • Hypertension Maternal Grandmother    • No Known Problems Sister    • Other Sister         Narcolepsy;fibromyalsia;bone;nerve   • Genitourinary () Sister         endometriosis     Social History     Social History   • Marital status: Single     Spouse name: N/A   • Number of children: N/A   • Years of education: N/A     Occupational History   • Not on file.     Social History Main Topics   • Smoking status: Never Smoker   • Smokeless tobacco: Never Used   • Alcohol use No   • Drug use: Yes     Frequency: 7.0 times per week     Types: Marijuana, Oral      Comment: Medicinal edible's   • Sexual activity: Not Currently     Birth control/ protection: Pill     Other Topics Concern   • Not on file     Social History Narrative    ** Merged History Encounter **          Allergies   Allergen Reactions   • Cefdinir Shortness of Breath and Itching     Tolerated 1/18/17  Tolerates ceftriaxone    • Depakote [Divalproex Sodium] Unspecified     Muscle spasms/muscle pain and weakness     • Doxycycline Anaphylaxis and Vomiting     RXN=unknown   • Abilify Unspecified     Headaches/muscle twitching     • Amitriptyline Unspecified     Headaches     • Amoxicillin Rash     Pt states \"I get a rash\".     • Ciprofloxacin Rash     Pt states \"I get a rash\".     • Clindamycin Nausea     Even with food     • Ees [Erythromycin] Vomiting and Nausea   • Flagyl " "[Metronidazole Hcl] Unspecified     \"eye problems\"     • Flomax [Tamsulosin Hydrochloride] Swelling   • Metformin Unspecified     Increased lactic acid      • Tape Rash     Tears skin off  coban with Tegaderm tape ok intermittently  RXN=ongoing   • Vancomycin Itching     Pt becomes flushed in face and chest.   RXN=7/10/16   • Wound Dressing Adhesive Hives     By pt report   • Cephalexin [Keflex] Rash     Pt states she gets a rash with this medication  Tolerates ceftriaxone   • Levofloxacin Unspecified     Leg muscle cramps   • Metronidazole Rash     .   • Valproic Acid Rash     .       Current Outpatient Prescriptions   Medication Sig Dispense Refill   • Diclofenac Sodium 1 % Gel Apply 1 Application to skin as directed 4 times a day. Apply's on wrist, back, ankles, and feet.     • Dulaglutide (TRULICITY) 0.75 MG/0.5ML Solution Pen-injector Inject 0.75 mg as instructed every 7 days. On Friday     • FLUoxetine (PROZAC) 20 MG Cap Take 20 mg by mouth every evening.     • methotrexate 2.5 MG Tab Take 7.5 mg by mouth every 7 days. On Sunday     • predniSONE (DELTASONE) 10 MG Tab Take 10 mg by mouth every day. Pt started on 7/3/2019  X 3 weeks then 25 mg daily x 3 weeks then 20 mg x 3 weeks then 15 mg daily as prescribed by Dr. Newton.     • CIPROFLOXACIN HCL OT Place 5 Drops in right ear 2 Times a Day. Pt started on 7/1/2019 for 5 day course.     • ranitidine (ZANTAC) 300 MG tablet Take 1 Tab by mouth every day. 60 Tab 3   • ondansetron (ZOFRAN) 4 MG Tab tablet Take 1 Tab by mouth every four hours as needed for Nausea/Vomiting. 20 Tab 3   • folic acid (FOLVITE) 1 MG Tab Take 1 Tab by mouth every day. 30 Tab 2   • ivabradine (CORLANOR) 5 MG Tab tablet Take 1.5 Tabs by mouth 2 times a day, with meals. 60 Tab 1   • sodium bicarbonate 325 MG Tab Take 325-650 mg by mouth 3 times a day. 650 mg in AM  325 mg mid-afternoon  650 mg at night     • albuterol 108 (90 Base) MCG/ACT Aero Soln inhalation aerosol Inhale 2 Puffs by " mouth every 6 hours as needed for Shortness of Breath.     • Melatonin 5 MG Tab Take 10 mg by mouth every day.     • furosemide (LASIX) 80 MG Tab Take 80 mg by mouth every day.     • busPIRone (BUSPAR) 5 MG tablet TAKE ONE TABLET BY MOUTH TWICE DAILY 60 Tab 4   • phenazopyridine (PYRIDIUM) 200 MG Tab Take 1 Tab by mouth 3 times a day as needed. 30 Tab 2   • vitamin D, Ergocalciferol, (DRISDOL) 03935 units Cap capsule Take 50,000 Units by mouth every Friday.     • norethindrone (DEBLITANE) 0.35 MG tablet Take 0.35 mg by mouth every day.     • ziprasidone (GEODON) 80 MG Cap Take 80 mg by mouth 2 Times a Day.     • methocarbamol (ROBAXIN) 750 MG Tab Take 750 mg by mouth 3 times a day.     • traZODone (DESYREL) 100 MG Tab TAKE  ONE TABLET BY MOUTH NIGHTLY AT BEDTIME 90 Tab 2   • Diphenhydramine-APAP, sleep, (TYLENOL PM EXTRA STRENGTH PO) Take 2 Caps by mouth every day.     • LYRICA 300 MG capsule Take 300 mg by mouth 2 times a day.     • aspirin EC (ECOTRIN) 81 MG Tablet Delayed Response Take 1 Tab by mouth every day. 30 Tab 6     No current facility-administered medications for this visit.        Review of Systems   Constitutional: Positive for malaise/fatigue. Negative for chills, fever and weight loss.   HENT: Negative for congestion, nosebleeds, sore throat and tinnitus.    Eyes: Negative for blurred vision and double vision.   Respiratory: Positive for shortness of breath (chronic- on home 02). Negative for cough, hemoptysis, sputum production, wheezing and stridor.    Cardiovascular: Positive for chest pain (States mild chest discomfort remains ) and palpitations. Negative for orthopnea, leg swelling and PND.   Gastrointestinal: Negative for abdominal pain, blood in stool, diarrhea, heartburn, melena, nausea and vomiting.   Skin: Negative for rash.   Neurological: Positive for weakness. Negative for dizziness, tingling, tremors, sensory change, speech change, focal weakness, loss of consciousness and headaches.     "    All others systems reviewed and negative.     Objective:     /90 (BP Location: Left arm, Patient Position: Sitting, BP Cuff Size: Adult)   Pulse 79   Ht 1.651 m (5' 5\")   Wt (!) 125.6 kg (277 lb)   SpO2 95%  Body mass index is 46.1 kg/m².    Physical Exam   Constitutional: She is oriented to person, place, and time and well-developed, well-nourished, and in no distress.   HENT:   Head: Normocephalic and atraumatic.   Eyes: Pupils are equal, round, and reactive to light. Conjunctivae and EOM are normal.   Neck: Normal range of motion. Neck supple. No JVD present.   Cardiovascular: Normal rate, regular rhythm, normal heart sounds and intact distal pulses.  Exam reveals no gallop and no friction rub.    No murmur heard.  Pulmonary/Chest: Effort normal and breath sounds normal. No respiratory distress. She has no wheezes. She has no rales. She exhibits no tenderness.   Abdominal: Soft. Bowel sounds are normal. There is no tenderness.   Musculoskeletal: Normal range of motion. She exhibits no edema.   Neurological: She is alert and oriented to person, place, and time.   Skin: Skin is warm and dry. No rash noted. No erythema.   Psychiatric: Mood, memory, affect and judgment normal.         Cardiac Imaging and Procedures Review:    EKG (7/10/19) reviewed by myself:   Sinus rhythm     Echo (3/1/17):   Left Ventricle  Contrast was used to enhance visualization of the endocardial border.   1.5 mL of contrast was administered. Normal left ventricular chamber   size. Mild concentric left ventricular hypertrophy. Normal left   ventricular systolic function. Left ventricular ejection fraction is   visually estimated to be 60%. Normal regional wall motion. Normal   diastolic function.    Right Ventricle  Normal right ventricular size and systolic function.    Right Atrium  Normal right atrial size. Vena cava is not well visualized.    Left Atrium  The left atrium is normal in size.    Mitral Valve  Structurally " normal mitral valve.  Trace mitral regurgitation.    Aortic Valve  Structurally normal aortic valve. No stenosis or regurgitation seen.    Tricuspid Valve  Structurally normal tricuspid valve without significant stenosis. Trace   tricuspid regurgitation. Unable to estimate pulmonary artery pressure   due to an inadequate tricuspid regurgitant jet.    Pulmonic Valve  The pulmonic valve is not well visualized. No stenosis or regurgitation   seen.    Pericardium  Normal pericardium without effusion.    Aorta  The aortic root is normal.    Labs (personally reviewed and notable for):   Lab Results   Component Value Date/Time    SODIUM 139 07/08/2019 03:49 PM    POTASSIUM 4.5 07/08/2019 03:49 PM    CHLORIDE 104 07/08/2019 03:49 PM    CO2 22 07/08/2019 03:49 PM    GLUCOSE 132 (H) 07/08/2019 03:49 PM    BUN 25 (H) 07/08/2019 03:49 PM    CREATININE 0.88 07/08/2019 03:49 PM         PT/INR:   Lab Results   Component Value Date/Time    PROTHROMBTM 13.3 01/31/2019 01:14 PM    INR 1.00 01/31/2019 01:14 PM       Assessment:     1. Tachycardia  EKG   2. Sinus tachycardia     3. Inappropriate sinus tachycardia         Medical Decision Making:  Today's Assessment / Status / Plan:   1. IAST:  - 12 lead EKG today in office shows sinus rhythm, rate 79.  There are no ST changes.  - I have reviewed case as well with Dr. Leon.  He recommends no additional changes at this time.  - In terms of her Methotrexate I have asked her to discuss symptoms with Dr Tee as she states started after starting medication.  She has had essentially a negative workup on recent ER visits.     - She will continue corlanor 7.5. Mg BID.    ED precautions discussed .           Plan reviewed in detail with the patient and She verbalizes understanding and is in agreement.   RTC in Oct as scheduled with Dr. Leon, sooner if clinical condition changes  Collaborating MD/ADD: VANIA Acuna M.D.  [address]  [facility] [address]  [facility] [address]  VIA In Basket

## 2019-07-11 ENCOUNTER — APPOINTMENT (OUTPATIENT)
Dept: RADIOLOGY | Facility: MEDICAL CENTER | Age: 29
End: 2019-07-11
Attending: EMERGENCY MEDICINE
Payer: MEDICARE

## 2019-07-11 ENCOUNTER — HOSPITAL ENCOUNTER (OUTPATIENT)
Dept: CARDIOLOGY | Facility: MEDICAL CENTER | Age: 29
End: 2019-07-11
Attending: NURSE PRACTITIONER
Payer: MEDICARE

## 2019-07-11 ENCOUNTER — TELEPHONE (OUTPATIENT)
Dept: CARDIOLOGY | Facility: MEDICAL CENTER | Age: 29
End: 2019-07-11

## 2019-07-11 ENCOUNTER — APPOINTMENT (OUTPATIENT)
Dept: PHYSICAL THERAPY | Facility: REHABILITATION | Age: 29
End: 2019-07-11
Attending: INTERNAL MEDICINE
Payer: MEDICARE

## 2019-07-11 ENCOUNTER — HOSPITAL ENCOUNTER (EMERGENCY)
Facility: MEDICAL CENTER | Age: 29
End: 2019-07-11
Attending: EMERGENCY MEDICINE
Payer: MEDICARE

## 2019-07-11 VITALS
WEIGHT: 277 LBS | SYSTOLIC BLOOD PRESSURE: 135 MMHG | HEART RATE: 80 BPM | OXYGEN SATURATION: 98 % | DIASTOLIC BLOOD PRESSURE: 84 MMHG | BODY MASS INDEX: 46.15 KG/M2 | HEIGHT: 65 IN | RESPIRATION RATE: 20 BRPM | TEMPERATURE: 98.2 F

## 2019-07-11 DIAGNOSIS — I47.11 INAPPROPRIATE SINUS TACHYCARDIA: ICD-10-CM

## 2019-07-11 DIAGNOSIS — R07.9 CHEST PAIN, UNSPECIFIED TYPE: ICD-10-CM

## 2019-07-11 DIAGNOSIS — R06.02 SHORTNESS OF BREATH: ICD-10-CM

## 2019-07-11 DIAGNOSIS — R06.00 DYSPNEA, UNSPECIFIED TYPE: ICD-10-CM

## 2019-07-11 LAB
ALBUMIN SERPL BCP-MCNC: 4.1 G/DL (ref 3.2–4.9)
ALBUMIN/GLOB SERPL: 2.2 G/DL
ALP SERPL-CCNC: 30 U/L (ref 30–99)
ALT SERPL-CCNC: 50 U/L (ref 2–50)
ANION GAP SERPL CALC-SCNC: 14 MMOL/L (ref 0–11.9)
AST SERPL-CCNC: 13 U/L (ref 12–45)
BASOPHILS # BLD AUTO: 0 % (ref 0–1.8)
BASOPHILS # BLD: 0 K/UL (ref 0–0.12)
BILIRUB SERPL-MCNC: 0.5 MG/DL (ref 0.1–1.5)
BUN SERPL-MCNC: 12 MG/DL (ref 8–22)
CALCIUM SERPL-MCNC: 8.9 MG/DL (ref 8.5–10.5)
CHLORIDE SERPL-SCNC: 102 MMOL/L (ref 96–112)
CO2 SERPL-SCNC: 20 MMOL/L (ref 20–33)
CREAT SERPL-MCNC: 0.72 MG/DL (ref 0.5–1.4)
D DIMER PPP IA.FEU-MCNC: <0.4 UG/ML (FEU) (ref 0–0.5)
EKG IMPRESSION: NORMAL
EOSINOPHIL # BLD AUTO: 0.12 K/UL (ref 0–0.51)
EOSINOPHIL NFR BLD: 0.8 % (ref 0–6.9)
ERYTHROCYTE [DISTWIDTH] IN BLOOD BY AUTOMATED COUNT: 45.6 FL (ref 35.9–50)
GLOBULIN SER CALC-MCNC: 1.9 G/DL (ref 1.9–3.5)
GLUCOSE SERPL-MCNC: 88 MG/DL (ref 65–99)
HCT VFR BLD AUTO: 37.2 % (ref 37–47)
HGB BLD-MCNC: 12.4 G/DL (ref 12–16)
LV EJECT FRACT  99904: 65
LV EJECT FRACT MOD 2C 99903: 76.57
LV EJECT FRACT MOD 4C 99902: 66.98
LV EJECT FRACT MOD BP 99901: 69.35
LYMPHOCYTES # BLD AUTO: 5.08 K/UL (ref 1–4.8)
LYMPHOCYTES NFR BLD: 34.8 % (ref 22–41)
MANUAL DIFF BLD: NORMAL
MCH RBC QN AUTO: 31 PG (ref 27–33)
MCHC RBC AUTO-ENTMCNC: 33.3 G/DL (ref 33.6–35)
MCV RBC AUTO: 93 FL (ref 81.4–97.8)
METAMYELOCYTES NFR BLD MANUAL: 0.9 %
MONOCYTES # BLD AUTO: 0.51 K/UL (ref 0–0.85)
MONOCYTES NFR BLD AUTO: 3.5 % (ref 0–13.4)
MORPHOLOGY BLD-IMP: NORMAL
NEUTROPHILS # BLD AUTO: 8.76 K/UL (ref 2–7.15)
NEUTROPHILS NFR BLD: 60 % (ref 44–72)
NRBC # BLD AUTO: 0.1 K/UL
NRBC BLD-RTO: 0.7 /100 WBC
NT-PROBNP SERPL IA-MCNC: 191 PG/ML (ref 0–125)
PLATELET # BLD AUTO: 182 K/UL (ref 164–446)
PLATELET BLD QL SMEAR: NORMAL
PMV BLD AUTO: 10.5 FL (ref 9–12.9)
POTASSIUM SERPL-SCNC: 3.5 MMOL/L (ref 3.6–5.5)
PROT SERPL-MCNC: 6 G/DL (ref 6–8.2)
RBC # BLD AUTO: 4 M/UL (ref 4.2–5.4)
RBC BLD AUTO: NORMAL
SODIUM SERPL-SCNC: 136 MMOL/L (ref 135–145)
T4 FREE SERPL-MCNC: 1.05 NG/DL (ref 0.53–1.43)
TROPONIN T SERPL-MCNC: <6 NG/L (ref 6–19)
TSH SERPL DL<=0.005 MIU/L-ACNC: 8.26 UIU/ML (ref 0.38–5.33)
WBC # BLD AUTO: 14.6 K/UL (ref 4.8–10.8)

## 2019-07-11 PROCEDURE — 84484 ASSAY OF TROPONIN QUANT: CPT

## 2019-07-11 PROCEDURE — 80053 COMPREHEN METABOLIC PANEL: CPT

## 2019-07-11 PROCEDURE — 700111 HCHG RX REV CODE 636 W/ 250 OVERRIDE (IP): Performed by: EMERGENCY MEDICINE

## 2019-07-11 PROCEDURE — 84439 ASSAY OF FREE THYROXINE: CPT

## 2019-07-11 PROCEDURE — 93005 ELECTROCARDIOGRAM TRACING: CPT

## 2019-07-11 PROCEDURE — 93005 ELECTROCARDIOGRAM TRACING: CPT | Performed by: EMERGENCY MEDICINE

## 2019-07-11 PROCEDURE — 93306 TTE W/DOPPLER COMPLETE: CPT

## 2019-07-11 PROCEDURE — 93306 TTE W/DOPPLER COMPLETE: CPT | Mod: 26 | Performed by: INTERNAL MEDICINE

## 2019-07-11 PROCEDURE — 83880 ASSAY OF NATRIURETIC PEPTIDE: CPT

## 2019-07-11 PROCEDURE — 84443 ASSAY THYROID STIM HORMONE: CPT

## 2019-07-11 PROCEDURE — 85007 BL SMEAR W/DIFF WBC COUNT: CPT

## 2019-07-11 PROCEDURE — 700117 HCHG RX CONTRAST REV CODE 255: Performed by: EMERGENCY MEDICINE

## 2019-07-11 PROCEDURE — 96374 THER/PROPH/DIAG INJ IV PUSH: CPT | Mod: XU

## 2019-07-11 PROCEDURE — 85379 FIBRIN DEGRADATION QUANT: CPT

## 2019-07-11 PROCEDURE — 99285 EMERGENCY DEPT VISIT HI MDM: CPT

## 2019-07-11 PROCEDURE — 85027 COMPLETE CBC AUTOMATED: CPT

## 2019-07-11 PROCEDURE — 71275 CT ANGIOGRAPHY CHEST: CPT

## 2019-07-11 PROCEDURE — 700102 HCHG RX REV CODE 250 W/ 637 OVERRIDE(OP): Performed by: EMERGENCY MEDICINE

## 2019-07-11 PROCEDURE — A9270 NON-COVERED ITEM OR SERVICE: HCPCS | Performed by: EMERGENCY MEDICINE

## 2019-07-11 RX ORDER — NITROGLYCERIN 0.4 MG/1
0.4 TABLET SUBLINGUAL ONCE
Status: COMPLETED | OUTPATIENT
Start: 2019-07-11 | End: 2019-07-11

## 2019-07-11 RX ORDER — SUCRALFATE 1 G/1
1 TABLET ORAL
Qty: 56 TAB | Refills: 0 | Status: SHIPPED | OUTPATIENT
Start: 2019-07-11 | End: 2019-07-25

## 2019-07-11 RX ADMIN — IOHEXOL 55 ML: 350 INJECTION, SOLUTION INTRAVENOUS at 03:52

## 2019-07-11 RX ADMIN — NITROGLYCERIN 0.4 MG: 0.4 TABLET, ORALLY DISINTEGRATING SUBLINGUAL at 03:30

## 2019-07-11 RX ADMIN — LIDOCAINE HYDROCHLORIDE 30 ML: 20 SOLUTION OROPHARYNGEAL at 03:15

## 2019-07-11 RX ADMIN — FAMOTIDINE 20 MG: 10 INJECTION INTRAVENOUS at 03:15

## 2019-07-11 ASSESSMENT — LIFESTYLE VARIABLES: DO YOU DRINK ALCOHOL: NO

## 2019-07-11 NOTE — ED PROVIDER NOTES
"                                                                          ED Provider Note    Scribed for Rahul Segura M.D. by Brook Nunes. 7/11/2019  2:33 AM    CHIEF COMPLAINT  Chief Complaint   Patient presents with   • Chest Pain     pt BIBA from home c/o chest pressure. pt was seen Monday @ [facility] with pleural effusion. scheduled to have ECHO today @0800 however pain was uncontrolled at home. 12 lead 70's NSR. /84 97% 2L NC (baseline) BGL 78       HPI  Kristin Balderrama is a 28 y.o. Female with a history including but not limited to asthma, cardiac ablation, and  a-fib who presents with worsening chest pain onset 6 days ago. Patient states her heart rate at home was 130 by pulse ox. Pain was described as \"pressure\" in quality and \"knotting\". Episodes of chest pain lasted 4 hours. She states she has taken 4 Asprin prior to arrival with no alleviation of chest pain. Pain is exacerbated with movement. Patient notes she had a CT scan taken 5 days ago due to kidney problems and hematuria. She states she is not on dialysis. Three days ago, she was seen at [facility] to see Dr. Cabral who stated she had a small pleural effusion. An echo was taken this morning. Patient states chest pain continued to persist after discharge. She states Dr. Leon is regular cardiologist. Patient endorses associated wheezing, shortness of breath, nausea, vomiting, diaphoresis, but denies any fever, or painful urination.Patient notes family history of heart disease, tachycardia, A-fib. She notes she is currently on 2 L of O2 at home. She denies history of blood clots. Patient denies smoking. She states she is currently taking corlanor.       Obtained and reviewed past medical records from which indicated patient was seen and evaluated for chest pain, shortness of breath, and dizziness. Patient is noted to have history of prior cardiac ablation and optic neuritis and is currently being treated with " Methotrexate. Prescription started in beginning of July by Dr. Cabral (neuro opthalmology). Records show 2 negative troponin, no acute ischemia on EKG, unremarkable chest xray. Patient was discharged with outpatient follow up.       REVIEW OF SYSTEMS  Constitutional: No fevers, chills, or recent illness.  Skin: No rashes or diaphoresis.  Eyes: No change in vision, no discharge.  ENT: No hearing change. No rhinorrhea or nasal congestion, no ST or difficulty swallowing.  Respiratory: No SOB. No coughing or hemoptysis. No Wheezing.  Cardiac: No CP, palpitations, edema. No PND or orthopnea.  GI: No Abdominal Pain; N/V; diarrhea, constipation. No blood in stool.  : No dysuria. No D/C. No frequency or urgency. No hesitancy.  MSK: No pain in joints or muscles. No calf pain or swelling.  Neuro: No HA or paresthesias. No focal weakness.  Psych: No SI, HI, AH, VH.  Endocrine: No polyuria or polydipsia. No heat or cold intolerance.  Heme: No easy bruising. No history of bleeding disorders or anemia.  See HPI for further details. All other systems are negative.       PAST MEDICAL HISTORY   has a past medical history of Abdominal pain; Anginal syndrome; Apnea, sleep; Arrhythmia; Arthritis; ASTHMA; Atrial fibrillation ([facility]); Back pain; Borderline personality disorder ([facility]); Breath shortness; Bronchitis; Cardiac arrhythmia; Chickenpox; Chronic UTI (9/18/2010); Cough; Daytime sleepiness; Depression; Diabetes (HCC); Diarrhea; Disorder of thyroid; Fall; Fatigue; Frequent headaches; Gasping for breath; Gynecological disorder; Headache(784.0); Heart burn; History of falling; Hypertension; Indigestion; Migraine; 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HCC); 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[facility]); Nausea; Obesity; Pain (08-15-12); Painful joint; PCOS (polycystic ovarian syndrome); Pneumonia (2012); Psychosis ([facility]); Renal disorder; Ringing in ears; Scoliosis; Shortness of breath; Sinus tachycardia (10/31/2013); Sleep apnea; Snoring; Tonsillitis;  Tuberculosis; Urinary bladder disorder; Urinary incontinence; Weakness; and Wears glasses.    SOCIAL HISTORY  Social History     Social History Main Topics   • Smoking status: Never Smoker   • Smokeless tobacco: Never Used   • Alcohol use No   • Drug use: Yes     Frequency: 7.0 times per week     Types: Marijuana, Oral      Comment: Medicinal edible's   • Sexual activity: Not Currently     Birth control/ protection: Pill       SURGICAL HISTORY   has a past surgical history that includes neuro dest facet l/s w/ig sngl (4/21/2015); recovery (1/27/2016); [place] by laparoscopy (8/29/2010); lumbar fusion anterior (8/21/2012); other cardiac surgery (1/2016); tonsillectomy; bowel stimulator insertion (7/13/2016); gastroscopy with balloon dilatation (N/A, 1/4/2017); muscle biopsy (Right, 1/26/2017); other abdominal surgery; and laminotomy.    CURRENT MEDICATIONS  Home Medications     Reviewed by Rosanne Paiz R.N. (Registered Nurse) on 07/11/19 at 0141  Med List Status: Partial   Medication Last Dose Status   albuterol 108 (90 Base) MCG/ACT Aero Soln inhalation aerosol  Active   aspirin EC (ECOTRIN) 81 MG Tablet Delayed Response  Active   busPIRone (BUSPAR) 5 MG tablet  Active   CIPROFLOXACIN HCL OT  Active   Diclofenac Sodium 1 % Gel  Active   Diphenhydramine-APAP, sleep, (TYLENOL PM EXTRA STRENGTH PO)  Active   Dulaglutide (TRULICITY) 0.75 MG/0.5ML Solution Pen-injector  Active   FLUoxetine (PROZAC) 20 MG Cap  Active   folic acid (FOLVITE) 1 MG Tab  Active   furosemide (LASIX) 80 MG Tab  Active   gabapentin (NEURONTIN) 300 MG Cap  Active   ivabradine (CORLANOR) 5 MG Tab tablet  Active   LYRICA 300 MG capsule  Active   Melatonin 5 MG Tab  Active   methocarbamol (ROBAXIN) 750 MG Tab  Active   methotrexate 2.5 MG Tab  Active   norethindrone (DEBLITANE) 0.35 MG tablet  Active   ondansetron (ZOFRAN) 4 MG Tab tablet  Active   phenazopyridine (PYRIDIUM) 200 MG Tab  Active   predniSONE (DELTASONE) 10 MG Tab  Active  "  ranitidine (ZANTAC) 300 MG tablet  Active   sodium bicarbonate 325 MG Tab  Active   traZODone (DESYREL) 100 MG Tab  Active   vitamin D, Ergocalciferol, (DRISDOL) 70481 units Cap capsule  Active   ziprasidone (GEODON) 80 MG Cap  Active                ALLERGIES  Allergies   Allergen Reactions   • Cefdinir Shortness of Breath and Itching     Tolerated 1/18/17  Tolerates ceftriaxone    • Depakote [Divalproex Sodium] Unspecified     Muscle spasms/muscle pain and weakness     • Doxycycline Anaphylaxis and Vomiting     RXN=unknown   • Abilify Unspecified     Headaches/muscle twitching     • Amitriptyline Unspecified     Headaches     • Amoxicillin Rash     Pt states \"I get a rash\".     • Ciprofloxacin Rash     Pt states \"I get a rash\".     • Clindamycin Nausea     Even with food     • Ees [Erythromycin] Vomiting and Nausea   • Flagyl [Metronidazole Hcl] Unspecified     \"eye problems\"     • Flomax [Tamsulosin Hydrochloride] Swelling   • Metformin Unspecified     Increased lactic acid      • Tape Rash     Tears skin off  coban with Tegaderm tape ok intermittently  RXN=ongoing   • Vancomycin Itching     Pt becomes flushed in face and chest.   RXN=7/10/16   • Wound Dressing Adhesive Hives     By pt report   • Cephalexin [Keflex] Rash     Pt states she gets a rash with this medication  Tolerates ceftriaxone   • Levofloxacin Unspecified     Leg muscle cramps   • Metronidazole Rash     .   • Valproic Acid Rash     .       PHYSICAL EXAM  VITAL SIGNS: /84   Pulse 83   Temp 36.8 °C (98.2 °F) (Temporal)   Resp 20   Ht 1.651 m (5' 5\")   Wt (!) 125.6 kg (277 lb)   LMP 06/28/2019   SpO2 100%   BMI 46.10 kg/m²  @JODI[[id number]::@   Pulse ox interpretation: Oxygen saturation is 90% when 2 L is turned off.  Genl: F sitting in chair comfortably, speaking clearly, Appears anxious, appears in mild distress  Head: NC/AT   ENT: Mucous membranes moist, posterior pharynx clear, uvula midline, nares patent bilaterally   Eyes: Normal " sclera, pupils equal round reactive to light  Neck: Supple, FROM, no LAD appreciated, Hirsuit faces,   Pulmonary: Nasal canula in placeCleared ausculation of upper airways. Diminished appreciation of lower lung field.Mild respiratory distress, Lungs are clear to auscultation bilaterally  Chest: No TTP  CV:  RRR, no murmur appreciated, pulses 2+ in both upper and lower extremities,  Abdomen: Has slight epigastric tendernesssoft, no rebound/guarding, no masses palpated, no vásquez's sign, no Mcburney's point tenderness.  : no CVA Tenderness  Musculoskeletal: Pain free ROM of the neck. Moving upper and lower extremities and spontaneous in coordinated fashion  Neuro: A&Ox4 (person, place, time, situation), speech fluent, gait steady, no focal deficits appreciated, No cerebellar signs. Sensation is grossly intact in the distal upper and lower extremities  5/5 strength in  and dorsiflexion/plantar flexion of the ankles  Psych: Patient has an appropriate affect and behavior.  Skin: Slighlty clammy, No rash or lesions.  No pallor or jaundice.  No cyanosis.  Warm and dry. Well perfused.    DIAGNOSTIC STUDIES / PROCEDURES    EKG  EKG performed at 0134 shows a normal sinus rhythm with a rate of  81. The intervals, axis, QRS, and ST waves are all normal. Nonspecific T wave flattening diffusely. No acute ischemia or infarction    LABS  Results for orders placed or performed during the hospital encounter of 07/11/19   CBC with Differential   Result Value Ref Range    WBC 14.6 (H) 4.8 - 10.8 K/uL    RBC 4.00 (L) 4.20 - 5.40 M/uL    Hemoglobin 12.4 12.0 - 16.0 g/dL    Hematocrit 37.2 37.0 - 47.0 %    MCV 93.0 81.4 - 97.8 fL    MCH 31.0 27.0 - 33.0 pg    MCHC 33.3 (L) 33.6 - 35.0 g/dL    RDW 45.6 35.9 - 50.0 fL    Platelet Count 182 164 - 446 K/uL    MPV 10.5 9.0 - 12.9 fL    Neutrophils-Polys 60.00 44.00 - 72.00 %    Lymphocytes 34.80 22.00 - 41.00 %    Monocytes 3.50 0.00 - 13.40 %    Eosinophils 0.80 0.00 - 6.90 %     Basophils 0.00 0.00 - 1.80 %    Nucleated RBC 0.70 /100 WBC    Neutrophils (Absolute) 8.76 (H) 2.00 - 7.15 K/uL    Lymphs (Absolute) 5.08 (H) 1.00 - 4.80 K/uL    Monos (Absolute) 0.51 0.00 - 0.85 K/uL    Eos (Absolute) 0.12 0.00 - 0.51 K/uL    Baso (Absolute) 0.00 0.00 - 0.12 K/uL    NRBC (Absolute) 0.10 K/uL   Complete Metabolic Panel (CMP)   Result Value Ref Range    Sodium 136 135 - 145 mmol/L    Potassium 3.5 (L) 3.6 - 5.5 mmol/L    Chloride 102 96 - 112 mmol/L    Co2 20 20 - 33 mmol/L    Anion Gap 14.0 (H) 0.0 - 11.9    Glucose 88 65 - 99 mg/dL    Bun 12 8 - 22 mg/dL    Creatinine 0.72 0.50 - 1.40 mg/dL    Calcium 8.9 8.5 - 10.5 mg/dL    AST(SGOT) 13 12 - 45 U/L    ALT(SGPT) 50 2 - 50 U/L    Alkaline Phosphatase 30 30 - 99 U/L    Total Bilirubin 0.5 0.1 - 1.5 mg/dL    Albumin 4.1 3.2 - 4.9 g/dL    Total Protein 6.0 6.0 - 8.2 g/dL    Globulin 1.9 1.9 - 3.5 g/dL    A-G Ratio 2.2 g/dL   TROPONIN   Result Value Ref Range    Troponin T <6 6 - 19 ng/L   DIFFERENTIAL MANUAL   Result Value Ref Range    Metamyelocytes 0.90 %    Manual Diff Status PERFORMED    PERIPHERAL SMEAR REVIEW   Result Value Ref Range    Peripheral Smear Review see below    PLATELET ESTIMATE   Result Value Ref Range    Plt Estimation Normal    MORPHOLOGY   Result Value Ref Range    RBC Morphology Normal    ESTIMATED GFR   Result Value Ref Range    GFR If African American >60 >60 mL/min/1.73 m 2    GFR If Non African American >60 >60 mL/min/1.73 m 2   D-DIMER   Result Value Ref Range    D-Dimer Screen <0.40 0.00 - 0.50 ug/mL (FEU)   TSH   Result Value Ref Range    TSH 8.260 (H) 0.380 - 5.330 uIU/mL   FREE THYROXINE   Result Value Ref Range    Free T-4 1.05 0.53 - 1.43 ng/dL   proBrain Natriuretic Peptide, NT   Result Value Ref Range    NT-proBNP 191 (H) 0 - 125 pg/mL   EKG   Result Value Ref Range    Report       [facility] Emergency Dept.    Test Date:  2019-07-11  Pt Name:    HARLEY DE LA CRUZ              Department: ER  MRN:         [id number]                      Room:        14  Gender:     Female                       Technician: [id number]  :        10                   Requested By:ER TRIAGE PROTOCOL  Order #:    [id number]                    Reading MD: Colton Kay MD    Measurements  Intervals                                Axis  Rate:       81                           P:          18  AZ:         152                          QRS:        21  QRSD:       90                           T:          -9  QT:         376  QTc:        437    Interpretive Statements  SINUS RHYTHM  BORDERLINE T ABNORMALITIES, DIFFUSE LEADS  Compared to ECG 07/10/2019 12:54:38  T-wave abnormality now present    Electronically Signed On 2019 3:55:54 PDT by Colton Kay MD       *Note: Due to a large number of results and/or encounters for the requested time period, some results have not been displayed. A complete set of results can be found in Results Review.      All labs reviewed by me.    RADIOLOGY  CT-CTA CHEST PULMONARY ARTERY W/ RECONS   Final Result      No CT evidence of pulmonary thromboembolism      New small bilateral pleural effusions      New small pericardial effusion      Similar mild splenomegaly              The radiologist's interpretations of all radiological studies have been reviewed by me.        COURSE & MEDICAL DECISION MAKING  Pertinent Labs & Imaging studies reviewed. (See chart for details)    Differential diagnoses include but not limited to: COPD, bronchitis, ACS, Angina, gisele metals, PE, medication side effects, dehydration, metabolic dysfunction.     2:33 AM - Patient seen and examined at bedside. Patient will be treated with Nitroglycerin 0.4 mg, GI cocktail 30 mL, Pepcid injection 20 mg. Ordered CT CTA chest pulmonary artery, TSH, D Dimer, CBC with differential, CMP, troponin, differential manual, Peripheral smear review, platelet estimate, morphology, estimated GFR, Free thyroxine to evaluate her symptoms.     4:10 AM  - Ordered probrain natriuretic peptide NT to evaluate symptoms.     Medical Decision Making:   Patient is a 28-year-old female, with the extensive history as noted previously who presents emergency room for continued left-sided anterior chest pain.  Chart is reviewed from her previous work-up which was grossly unremarkable.  She now reports that despite taking her medicines she has been feeling some concerning chest discomfort and shortness of breath and came in for further evaluation she is concerned about a blood clot in her lungs.    Prior pregnancy test is negative, she has soft oxygen saturations in the 90s on her baseline 2 L of oxygen.  D-dimer is negative but clinical indicators are concerning with her progressive dyspnea.  CTA of the chest is obtained and is negative for an acute pulmonary embolus.  Both previously seen small pericardial effusions and bilateral pleural effusions are noted.  Troponin is undetectable.  EKG is without acute ischemia or infarction.  At this time I do not think that this is acute coronary syndrome.  Heart score is extremely low.  BNP is slightly elevated however she is not demonstrating signs of acute fluid overload status.  When called, the patient's oxygen saturations normalized to her baseline levels.  She is planned for an outpatient echocardiogram today and is followed by Dr. Joel on a chronic basis.  It was previously thought that her initiation of mitis may have exaggerated her peptic ulcer disease.  This is very possible I do agree with previous physicians recommendation of initiation of it she has this medicine at home and will be given a coating medicine to initiate as well.    Repeat evaluations demonstrated the patient had received initial aspirin and nitro on arrival with no improvement and overall improvement following GI cocktail is somewhat reassuring.  ?  Questions are addressed and the patient will be discharged home in stable condition.    FINAL  IMPRESSION  Visit Diagnoses     ICD-10-CM   1. Chest pain, unspecified type R07.9   2. Dyspnea, unspecified type R06.00   3. Shortness of breath R06.02       Brook RODRIGUEZ (Scribe), am scribing for, and in the presence of, Rahul Segura M.D..    Electronically signed by: Brook Nunes (Carolibe), 7/11/2019    IRahul M.D. personally performed the services described in this documentation, as scribed by Brook Nunes in my presence, and it is both accurate and complete. C    The note accurately reflects work and decisions made by me.  Rahul Segura  7/11/2019  6:34 AM

## 2019-07-11 NOTE — TELEPHONE ENCOUNTER
Please arrange for echo tomorrow or Friday per conversation with Dr. Yousif .   Please let patient know that we will recheck echo for small effusion seen on CT.   Xochitl

## 2019-07-11 NOTE — ED NOTES
Pt incontinent of urine. Pt cleaned, bedding, and gown changed. purewick placed per pt request. celso well

## 2019-07-11 NOTE — ED NOTES
Pt discharged home. Assessment complete. VS stable. Pt verbalized understanding discharge instructions. Prescriptions given; pt verbalizes teaching provided. Pt waiting for grandmother to bring oxygen , walker, and clothes at this time. Pt states she will go straight to her appointment to get her scheduled ECHO. Pt in NAD breathing even and unlabored. Remains on oxygen and purewick until family arrives.

## 2019-07-11 NOTE — TELEPHONE ENCOUNTER
Notified pt of need for echo and provided with image schedulers number. Stressed to her that we would like echo done tomorrow but by the end of the week at the latest. Changed echo order to STAT. She says she will call today and set this up. Requested that she call our office if she has any issues setting this up. Discussed ED precautions in the event that her SOB increases.

## 2019-07-11 NOTE — ED NOTES
Report from SONYA Lay    Awaiting [facility] to  pt.  Pt without questions or concerns at this time.

## 2019-07-12 ENCOUNTER — OFFICE VISIT (OUTPATIENT)
Dept: MEDICAL GROUP | Facility: MEDICAL CENTER | Age: 29
End: 2019-07-12
Payer: MEDICARE

## 2019-07-12 VITALS
SYSTOLIC BLOOD PRESSURE: 114 MMHG | HEIGHT: 65 IN | OXYGEN SATURATION: 97 % | BODY MASS INDEX: 46.82 KG/M2 | HEART RATE: 82 BPM | TEMPERATURE: 97.5 F | WEIGHT: 281 LBS | DIASTOLIC BLOOD PRESSURE: 76 MMHG

## 2019-07-12 DIAGNOSIS — J90 PLEURAL EFFUSION: ICD-10-CM

## 2019-07-12 DIAGNOSIS — R07.9 CHEST PAIN, UNSPECIFIED TYPE: ICD-10-CM

## 2019-07-12 DIAGNOSIS — R06.02 SOB (SHORTNESS OF BREATH): ICD-10-CM

## 2019-07-12 DIAGNOSIS — R79.89 ELEVATED TSH: ICD-10-CM

## 2019-07-12 PROCEDURE — 99214 OFFICE O/P EST MOD 30 MIN: CPT | Performed by: INTERNAL MEDICINE

## 2019-07-12 RX ORDER — COLCHICINE 0.6 MG/1
0.6 TABLET ORAL DAILY
Qty: 14 TAB | Refills: 0 | Status: SHIPPED | OUTPATIENT
Start: 2019-07-12 | End: 2019-07-12

## 2019-07-12 NOTE — LETTER
July 12, 2019        Kristin Armstrong Balderrama        May resume physical therapy.                 Torres Brody M.D.

## 2019-07-12 NOTE — TELEPHONE ENCOUNTER
Call out to pt regarding results. No answer. LVM with details and to call back with any questions.

## 2019-07-12 NOTE — PROGRESS NOTES
CC: Follow-up hospitalization chest pain.                                                                                                                                   HPI:   Kristin presents today with the following.    1. Chest pain, unspecified type  Presents after going to the emergency room with chest pains ruled out for heart attack as well as PE.  She was found to have a very small pleural effusion as well as a possible pericardial effusion.  Troponins were negative.  EKG did show some mild T wave changes.  Echocardiogram was normal did not reveal an effusion.    2. Elevated TSH  Thyroid tested in the emergency room found to be elevated test 3 months ago were normal.  She has been on thyroid meds in the distant past.    3. SOB (shortness of breath)/. Pleural effusion  She is complaining of persistent shortness of breath and again a small effusion found on CT.  She does have probable sleep apnea but cannot do the test that she cannot sleep for long periods of time to get fully evaluated.      Patient Active Problem List    Diagnosis Date Noted   • Left leg weakness 07/14/2018     Priority: High   • Controlled type 2 diabetes mellitus without complication, without long-term current use of insulin ([facility]) 04/26/2017     Priority: High   • Sinus tachycardia 10/31/2013     Priority: High   • Chronic inflammatory arthritis 05/23/2016     Priority: Medium   • Morbid obesity with BMI of 45.0-49.9, adult ([facility]) 10/24/2017     Priority: Low   • Depression 10/28/2016     Priority: Low   • Schizophrenia ([facility]) 10/27/2016     Priority: Low   • PCOS (polycystic ovarian syndrome) 11/23/2015     Priority: Low   • Progressive focal motor weakness 06/28/2015     Priority: Low   • Fatty liver disease, nonalcoholic 01/19/2015     Priority: Low   • Anxiety 12/16/2014     Priority: Low   • Knee pain, right 02/13/2014     Priority: Low   • Neurogenic bladder 04/02/2011     Priority: Low   • Recurrent UTI 09/18/2010      Priority: Low   • Borderline personality disorder in adult ([facility]) 09/18/2010     Priority: Low   • Moderate intermittent asthma without complication 06/20/2019   • Optic neuritis 05/27/2019   • Inappropriate sinus tachycardia 04/10/2019   • Palpitations 10/01/2018   • Chronic respiratory failure with hypoxia, on home oxygen therapy ([facility]) 08/08/2018   • Transaminitis 08/08/2018   • TONYA (obstructive sleep apnea) 01/09/2018   • Functional diarrhea 01/05/2018   • Breast wound 11/06/2017   • Intractable episodic cluster headache 09/14/2017   • Hashimoto's encephalopathy 05/17/2017   • On home oxygen therapy 04/15/2017   • Weakness of right upper extremity 02/23/2017   • Hypovitaminosis D 11/29/2016   • Chronic pain syndrome 10/27/2016   • Bowel and bladder incontinence 10/27/2016   • Galactorrhea 07/22/2016   • Elevated sedimentation rate 06/27/2016   • Weakness of both lower extremities 06/22/2016   • Vitamin D deficiency 05/21/2016   • Morbidly obese ([facility]) 03/07/2016   • Scoliosis 03/07/2016   • GERD (gastroesophageal reflux disease) 03/07/2016   • Peripheral neuropathy (CMS-HCC) 03/06/2016   • H/O prior ablation treatment 02/10/2016       Current Outpatient Prescriptions   Medication Sig Dispense Refill   • sucralfate (CARAFATE) 1 GM Tab Take 1 Tab by mouth 4 Times a Day,Before Meals and at Bedtime for 14 days. 56 Tab 0   • gabapentin (NEURONTIN) 300 MG Cap Take 300 mg by mouth 4 times a day.     • Diclofenac Sodium 1 % Gel Apply 1 Application to skin as directed 4 times a day. Apply's on wrist, back, ankles, and feet.     • Dulaglutide (TRULICITY) 0.75 MG/0.5ML Solution Pen-injector Inject 0.75 mg as instructed every 7 days. On Friday     • FLUoxetine (PROZAC) 20 MG Cap Take 20 mg by mouth every evening.     • predniSONE (DELTASONE) 10 MG Tab Take 10 mg by mouth every day. Pt started on 7/3/2019  X 3 weeks then 25 mg daily x 3 weeks then 20 mg x 3 weeks then 15 mg daily as prescribed by Dr. Newton.     •  CIPROFLOXACIN HCL OT Place 5 Drops in right ear 2 Times a Day. Pt started on 7/1/2019 for 5 day course.     • ranitidine (ZANTAC) 300 MG tablet Take 1 Tab by mouth every day. 60 Tab 3   • ondansetron (ZOFRAN) 4 MG Tab tablet Take 1 Tab by mouth every four hours as needed for Nausea/Vomiting. 20 Tab 3   • folic acid (FOLVITE) 1 MG Tab Take 1 Tab by mouth every day. 30 Tab 2   • ivabradine (CORLANOR) 5 MG Tab tablet Take 1.5 Tabs by mouth 2 times a day, with meals. 60 Tab 1   • sodium bicarbonate 325 MG Tab Take 325-650 mg by mouth 3 times a day. 650 mg in AM  325 mg mid-afternoon  650 mg at night     • albuterol 108 (90 Base) MCG/ACT Aero Soln inhalation aerosol Inhale 2 Puffs by mouth every 6 hours as needed for Shortness of Breath.     • Melatonin 5 MG Tab Take 10 mg by mouth every day.     • furosemide (LASIX) 80 MG Tab Take 80 mg by mouth every day.     • busPIRone (BUSPAR) 5 MG tablet TAKE ONE TABLET BY MOUTH TWICE DAILY 60 Tab 4   • phenazopyridine (PYRIDIUM) 200 MG Tab Take 1 Tab by mouth 3 times a day as needed. 30 Tab 2   • norethindrone (DEBLITANE) 0.35 MG tablet Take 0.35 mg by mouth every day.     • ziprasidone (GEODON) 80 MG Cap Take 80 mg by mouth 2 Times a Day.     • methocarbamol (ROBAXIN) 750 MG Tab Take 750 mg by mouth 3 times a day.     • traZODone (DESYREL) 100 MG Tab TAKE  ONE TABLET BY MOUTH NIGHTLY AT BEDTIME 90 Tab 2   • Diphenhydramine-APAP, sleep, (TYLENOL PM EXTRA STRENGTH PO) Take 2 Caps by mouth every day.     • LYRICA 300 MG capsule Take 300 mg by mouth 2 times a day.     • aspirin EC (ECOTRIN) 81 MG Tablet Delayed Response Take 1 Tab by mouth every day. 30 Tab 6   • methotrexate 2.5 MG Tab Take 7.5 mg by mouth every 7 days. On Sunday       No current facility-administered medications for this visit.          Allergies as of 07/12/2019 - Reviewed 07/12/2019   Allergen Reaction Noted   • Cefdinir Shortness of Breath and Itching 03/01/2016   • Depakote [divalproex sodium] Unspecified  "06/14/2010   • Doxycycline Anaphylaxis and Vomiting 08/15/2012   • Abilify Unspecified 01/17/2013   • Amitriptyline Unspecified 10/31/2013   • Amoxicillin Rash 09/18/2010   • Ciprofloxacin Rash 12/17/2009   • Clindamycin Nausea 02/02/2011   • Ees [erythromycin] Vomiting and Nausea 08/28/2010   • Flagyl [metronidazole hcl] Unspecified 03/31/2011   • Flomax [tamsulosin hydrochloride] Swelling 09/24/2009   • Metformin Unspecified 07/23/2013   • Tape Rash 08/15/2012   • Vancomycin Itching 07/10/2016   • Wound dressing adhesive Hives 01/12/2018   • Cephalexin [keflex] Rash 01/01/2017   • Levofloxacin Unspecified 10/27/2016   • Metronidazole Rash 03/30/2017   • Valproic acid Rash 03/30/2017        ROS: Denies Chest pain, SOB, LE edema.    /76 (BP Location: Right arm, Patient Position: Sitting) Comment (BP Location): R. Forarm  Pulse 82   Temp 36.4 °C (97.5 °F)   Ht 1.651 m (5' 5\")   Wt (!) 127.5 kg (281 lb)   LMP 06/28/2019   SpO2 97%   BMI 46.76 kg/m²     Physical Exam:  Gen:         Alert and oriented, No apparent distress.  Neck:        No Lymphadenopathy or Bruits.  Lungs:     Clear to auscultation bilaterally  CV:          Regular rate and rhythm. No murmurs, rubs or gallops.               Ext:          No clubbing, cyanosis, edema.      Assessment and Plan.   28 y.o. female with the following issues.    1. Chest pain, unspecified type  Given the EKG changes and possible pericardial effusion given a short course of colchicine to see if it improves    2. Elevated TSH  Rechecking full thyroid panel.  - TSH; Future  - TRIIDOTHYRONINE; Future  - FREE THYROXINE; Future  - THYROID PEROXIDASE  (TPO) AB; Future    3. SOB (shortness of breath)/Pleural effusion  Refer to pulmonology given ER precautions for dramatic worsening of symptoms  - REFERRAL TO PULMONOLOGY        "

## 2019-07-12 NOTE — TELEPHONE ENCOUNTER
Kiana Malik, Med Ass't  Liv Aguirre, R.N.   Phone Number: [phone]             EA pt wanted to let her know that she was just prescribed sucralfate (CARAFATE).      Noted.

## 2019-07-12 NOTE — TELEPHONE ENCOUNTER
Echo from this AM as read shows normal pericardium without evidence of effusion.  I have reviewed CT report from this AM and reported effusion on CT is stable.  Sometimes there is slight difference seen on imaging modalities.  No concern for hemodynamic compromise at this time.     lan

## 2019-07-13 LAB — EKG IMPRESSION: NORMAL

## 2019-07-15 ENCOUNTER — TELEPHONE (OUTPATIENT)
Dept: MEDICAL GROUP | Facility: MEDICAL CENTER | Age: 29
End: 2019-07-15

## 2019-07-15 ENCOUNTER — TELEPHONE (OUTPATIENT)
Dept: CARDIOLOGY | Facility: MEDICAL CENTER | Age: 29
End: 2019-07-15

## 2019-07-15 NOTE — TELEPHONE ENCOUNTER
----- Message from Sonal Lynch sent at 7/15/2019 12:14 PM PDT -----  Regarding: heart rate is running high  BRANDON/Catalina      Patient asking to cut back on her Prednisone. She said her heart rate is in the 110-120s, and she's having issues with her balance. She can be reached at [phone].

## 2019-07-15 NOTE — TELEPHONE ENCOUNTER
VOICEMAIL  1. Caller Name: [facility]                       Call Back Number: [phone]    2. Message: PT called and they need a clearance to continue PT with patient after she was in the hospital. Please advise.    3. Patient approves office to leave a detailed voicemail/MyChart message: N\A

## 2019-07-15 NOTE — TELEPHONE ENCOUNTER
Pt on prednisone 30mg qd for optic neuritis, her HR is very high, she doesn't feel good, c/o dizziness, has fallen 3 times today. Advised should go to ER for eval. She states she will comply.

## 2019-07-15 NOTE — LETTER
July 16, 2019        Kristin Balderrama        May resume PT.   Not sure why this  Communication is not taking place through epic.       [phone]                Torres Brody M.D.

## 2019-07-16 ENCOUNTER — PHYSICAL THERAPY (OUTPATIENT)
Dept: PHYSICAL THERAPY | Facility: REHABILITATION | Age: 29
End: 2019-07-16
Attending: INTERNAL MEDICINE
Payer: MEDICARE

## 2019-07-16 ENCOUNTER — HOSPITAL ENCOUNTER (OUTPATIENT)
Dept: LAB | Facility: MEDICAL CENTER | Age: 29
End: 2019-07-16
Attending: INTERNAL MEDICINE
Payer: MEDICARE

## 2019-07-16 DIAGNOSIS — M25.579 ACUTE ANKLE PAIN, UNSPECIFIED LATERALITY: ICD-10-CM

## 2019-07-16 DIAGNOSIS — R79.89 ELEVATED TSH: ICD-10-CM

## 2019-07-16 LAB
T3 SERPL-MCNC: 72.7 NG/DL (ref 60–181)
T4 FREE SERPL-MCNC: 0.97 NG/DL (ref 0.53–1.43)
THYROPEROXIDASE AB SERPL-ACNC: 139.6 IU/ML (ref 0–9)
TSH SERPL DL<=0.005 MIU/L-ACNC: 7.79 UIU/ML (ref 0.38–5.33)

## 2019-07-16 PROCEDURE — 84480 ASSAY TRIIODOTHYRONINE (T3): CPT

## 2019-07-16 PROCEDURE — 36415 COLL VENOUS BLD VENIPUNCTURE: CPT | Mod: GA

## 2019-07-16 PROCEDURE — 84439 ASSAY OF FREE THYROXINE: CPT | Mod: GA

## 2019-07-16 PROCEDURE — 86376 MICROSOMAL ANTIBODY EACH: CPT

## 2019-07-16 PROCEDURE — 97110 THERAPEUTIC EXERCISES: CPT

## 2019-07-16 PROCEDURE — 84443 ASSAY THYROID STIM HORMONE: CPT | Mod: GA

## 2019-07-16 NOTE — OP THERAPY DAILY TREATMENT
"  Outpatient Physical Therapy  DAILY TREATMENT     [facility] [address].  [address]  [place] [address]  Phone:  [phone]  Fax:  [phone]    Date: 07/16/2019    Patient: Kristin Balderrama  YOB: 1990  MRN: [id number]     Time Calculation  Start time: 1030  Stop time: 1105 Time Calculation (min): 35 minutes     Chief Complaint: Loss Of Balance; Ankle Injury; and Hand Injury    Visit #: 5    SUBJECTIVE:  The patient did not go to the ED yesterday, which the NP advised her to do on the phone due to her elevated HR.    She presents to appointment today in WC on 2L 02. Reports that she adjusts it between 2-4L depending on pulse ox.     Received clearance from Dr. Brody to see the patient today in PT, but this was confirmed prior to the patient calling the NP.    The patient reports that since her last PT visit, she has fallen at least three times at home due to dizziness. \"My heart was pounding so fast\". She reports that she couldn't stay upright and she fell.     She saw her hand therapist at the [facility] this morning, who brought her down to urgent care at the [facility], who advised her that she  May have occult fractures in R fingers following most recent fall.    Endorses feeling lightheaded while sitting in chair today. Has not yet contacted [facility] to schedule home assessment due to medical issues taking up much of her time.      She has been wall walking at home as it is too painful for her hand to use small WC that fits in her home.      No caregiver at home currently excepting patient's grandmother, who is providing all support at home for the patient. Grandma is also caring for her , who has dementia and for the patient's uncle, who is a triple amputee.       OBJECTIVE:  Current objective measures:  Sa02: 96% on 2L   HR: 88bpm   BP: 132/78mmHg          Therapeutic Exercises (CPT 54169):     1. Review of HEP as below      Therapeutic Exercise Summary: " "Access Code: [email or web address]   URL: [email or web address]   Date: 07/16/2019   Prepared by: Ursula Escalera      Exercises  · Seated Ankle Circles - 10 reps - 3 sets - 1x daily - 7x weekly  · Seated Long Arc Quad - 10 reps - 3 sets - 1x daily - 7x weekly  · Seated Ankle Pumps - 10 reps - 3 sets - 1x daily - 7x weekly          Patient education: Spoke with the patient and her grandmother for >15 minutes to reinforce safety recommendations of using walker, commode, her smaller wc in the home environment. Patient's grandmother reported that the patient frequently walks with no AD in the home, although grandmother encourages her to use wc. Grandmother reports that [facility] did a home assessment several years ago and that \"there was nothing they could do, they wanted us to knock down the walls\".     Time-based treatments/modalities:  Therapeutic exercise minutes (CPT 66533): 15 minutes       ASSESSMENT:   Response to treatment: Patient continues to exhibit poor safety awareness and has not employed therapist safety recommendations. Co-morbidities contribute to recent falls, with recent episodes of dizziness as main source of LOB per patient.   Patient does report reduced R ankle pain - ankle sprain is primary diagnosis currently for PT - and has been performing the seated HEP intermittently. Recommend continued PT with emphasis on implementing recommendation for improved safety in gait and transfers.    PLAN/RECOMMENDATIONS:   Plan for treatment: therapy treatment to continue next visit.  Planned interventions for next visit: continue with current treatment.       "

## 2019-07-17 ENCOUNTER — TELEPHONE (OUTPATIENT)
Dept: MEDICAL GROUP | Facility: MEDICAL CENTER | Age: 29
End: 2019-07-17

## 2019-07-17 NOTE — TELEPHONE ENCOUNTER
1. Caller Name: Kristin                                         Call Back Number: [phone]      Patient approves a detailed voicemail message: N\A    Patient called and was wondering if the elevated TPO could cause pressure on her optic nerve. She is having eye pain again. Please advise.

## 2019-07-18 ENCOUNTER — OFFICE VISIT (OUTPATIENT)
Dept: MEDICAL GROUP | Facility: MEDICAL CENTER | Age: 29
End: 2019-07-18
Payer: MEDICARE

## 2019-07-18 VITALS
SYSTOLIC BLOOD PRESSURE: 112 MMHG | OXYGEN SATURATION: 96 % | HEART RATE: 91 BPM | DIASTOLIC BLOOD PRESSURE: 70 MMHG | TEMPERATURE: 98 F

## 2019-07-18 DIAGNOSIS — E11.9 CONTROLLED TYPE 2 DIABETES MELLITUS WITHOUT COMPLICATION, WITHOUT LONG-TERM CURRENT USE OF INSULIN (HCC): ICD-10-CM

## 2019-07-18 DIAGNOSIS — E03.9 ACQUIRED HYPOTHYROIDISM: ICD-10-CM

## 2019-07-18 PROCEDURE — 99214 OFFICE O/P EST MOD 30 MIN: CPT | Performed by: INTERNAL MEDICINE

## 2019-07-18 RX ORDER — LEVOTHYROXINE SODIUM 0.07 MG/1
75 TABLET ORAL
Qty: 90 TAB | Refills: 1 | Status: SHIPPED | OUTPATIENT
Start: 2019-07-18 | End: 2019-11-06

## 2019-07-18 RX ORDER — MYCOPHENOLATE MOFETIL 500 MG/1
1000 TABLET ORAL 2 TIMES DAILY
Status: ON HOLD | COMMUNITY
End: 2020-11-03 | Stop reason: SDUPTHER

## 2019-07-18 NOTE — PROGRESS NOTES
CC: Follow-up thyroid and diabetes                                                                                                                                      HPI:   Kristin presents today with the following.    1. Controlled type 2 diabetes mellitus without complication, without long-term current use of insulin ([facility])  Presents recently starting on Trulicity reporting tolerating well blood sugars in the 130s persistently no hypoglycemia or significant nausea.  She follows up with diabetic nurse in 2 weeks.    2. Acquired hypothyroidism  Thyroid found to be underactive with a TSH of 7 confirmed by retake and positive TPO antibodies.  She had been on meds in the past but stopped taking them for unknown reasons.  Weight is certainly difficult to manage and again would benefit from thyroid medications.      Patient Active Problem List    Diagnosis Date Noted   • Left leg weakness 07/14/2018     Priority: High   • Controlled type 2 diabetes mellitus without complication, without long-term current use of insulin ([facility]) 04/26/2017     Priority: High   • Sinus tachycardia 10/31/2013     Priority: High   • Chronic inflammatory arthritis 05/23/2016     Priority: Medium   • Morbid obesity with BMI of 45.0-49.9, adult ([facility]) 10/24/2017     Priority: Low   • Depression 10/28/2016     Priority: Low   • Schizophrenia ([facility]) 10/27/2016     Priority: Low   • PCOS (polycystic ovarian syndrome) 11/23/2015     Priority: Low   • Progressive focal motor weakness 06/28/2015     Priority: Low   • Fatty liver disease, nonalcoholic 01/19/2015     Priority: Low   • Anxiety 12/16/2014     Priority: Low   • Knee pain, right 02/13/2014     Priority: Low   • Neurogenic bladder 04/02/2011     Priority: Low   • Recurrent UTI 09/18/2010     Priority: Low   • Borderline personality disorder in adult ([facility]) 09/18/2010     Priority: Low   • Moderate intermittent asthma without complication 06/20/2019   • Optic neuritis 05/27/2019   •  Inappropriate sinus tachycardia 04/10/2019   • Palpitations 10/01/2018   • Chronic respiratory failure with hypoxia, on home oxygen therapy ([facility]) 08/08/2018   • Transaminitis 08/08/2018   • TONYA (obstructive sleep apnea) 01/09/2018   • Functional diarrhea 01/05/2018   • Breast wound 11/06/2017   • Intractable episodic cluster headache 09/14/2017   • Acquired hypothyroidism 08/04/2017   • Hashimoto's encephalopathy 05/17/2017   • On home oxygen therapy 04/15/2017   • Weakness of right upper extremity 02/23/2017   • Hypovitaminosis D 11/29/2016   • Chronic pain syndrome 10/27/2016   • Bowel and bladder incontinence 10/27/2016   • Galactorrhea 07/22/2016   • Elevated sedimentation rate 06/27/2016   • Weakness of both lower extremities 06/22/2016   • Vitamin D deficiency 05/21/2016   • Morbidly obese ([facility]) 03/07/2016   • Scoliosis 03/07/2016   • GERD (gastroesophageal reflux disease) 03/07/2016   • Peripheral neuropathy (CMS-HCC) 03/06/2016   • H/O prior ablation treatment 02/10/2016       Current Outpatient Prescriptions   Medication Sig Dispense Refill   • mycophenolate (CELLCEPT) 500 MG tablet Take 500 mg by mouth 2 times a day.     • levothyroxine (SYNTHROID) 75 MCG Tab Take 1 Tab by mouth Every morning on an empty stomach. 90 Tab 1   • sucralfate (CARAFATE) 1 GM Tab Take 1 Tab by mouth 4 Times a Day,Before Meals and at Bedtime for 14 days. 56 Tab 0   • gabapentin (NEURONTIN) 300 MG Cap Take 300 mg by mouth 4 times a day.     • Diclofenac Sodium 1 % Gel Apply 1 Application to skin as directed 4 times a day. Apply's on wrist, back, ankles, and feet.     • Dulaglutide (TRULICITY) 0.75 MG/0.5ML Solution Pen-injector Inject 0.75 mg as instructed every 7 days. On Friday     • FLUoxetine (PROZAC) 20 MG Cap Take 20 mg by mouth every evening.     • methotrexate 2.5 MG Tab Take 7.5 mg by mouth every 7 days. On Sunday     • predniSONE (DELTASONE) 10 MG Tab Take 40 mg by mouth every day. Pt started on 7/3/2019  X 3 weeks then  25 mg daily x 3 weeks then 20 mg x 3 weeks then 15 mg daily as prescribed by Dr. Newton.      • CIPROFLOXACIN HCL OT Place 5 Drops in right ear 2 Times a Day. Pt started on 7/1/2019 for 5 day course.     • ranitidine (ZANTAC) 300 MG tablet Take 1 Tab by mouth every day. 60 Tab 3   • ondansetron (ZOFRAN) 4 MG Tab tablet Take 1 Tab by mouth every four hours as needed for Nausea/Vomiting. 20 Tab 3   • folic acid (FOLVITE) 1 MG Tab Take 1 Tab by mouth every day. 30 Tab 2   • ivabradine (CORLANOR) 5 MG Tab tablet Take 1.5 Tabs by mouth 2 times a day, with meals. 60 Tab 1   • sodium bicarbonate 325 MG Tab Take 325-650 mg by mouth 3 times a day. 650 mg in AM  325 mg mid-afternoon  650 mg at night     • albuterol 108 (90 Base) MCG/ACT Aero Soln inhalation aerosol Inhale 2 Puffs by mouth every 6 hours as needed for Shortness of Breath.     • Melatonin 5 MG Tab Take 10 mg by mouth every day.     • furosemide (LASIX) 80 MG Tab Take 80 mg by mouth every day.     • busPIRone (BUSPAR) 5 MG tablet TAKE ONE TABLET BY MOUTH TWICE DAILY 60 Tab 4   • phenazopyridine (PYRIDIUM) 200 MG Tab Take 1 Tab by mouth 3 times a day as needed. 30 Tab 2   • norethindrone (DEBLITANE) 0.35 MG tablet Take 0.35 mg by mouth every day.     • ziprasidone (GEODON) 80 MG Cap Take 80 mg by mouth 2 Times a Day.     • methocarbamol (ROBAXIN) 750 MG Tab Take 750 mg by mouth 3 times a day.     • traZODone (DESYREL) 100 MG Tab TAKE  ONE TABLET BY MOUTH NIGHTLY AT BEDTIME 90 Tab 2   • Diphenhydramine-APAP, sleep, (TYLENOL PM EXTRA STRENGTH PO) Take 2 Caps by mouth every day.     • LYRICA 300 MG capsule Take 300 mg by mouth 2 times a day.     • aspirin EC (ECOTRIN) 81 MG Tablet Delayed Response Take 1 Tab by mouth every day. 30 Tab 6     No current facility-administered medications for this visit.          Allergies as of 07/18/2019 - Reviewed 07/18/2019   Allergen Reaction Noted   • Cefdinir Shortness of Breath and Itching 03/01/2016   • Depakote [divalproex  sodium] Unspecified 06/14/2010   • Doxycycline Anaphylaxis and Vomiting 08/15/2012   • Abilify Unspecified 01/17/2013   • Amitriptyline Unspecified 10/31/2013   • Amoxicillin Rash 09/18/2010   • Ciprofloxacin Rash 12/17/2009   • Clindamycin Nausea 02/02/2011   • Ees [erythromycin] Vomiting and Nausea 08/28/2010   • Flagyl [metronidazole hcl] Unspecified 03/31/2011   • Flomax [tamsulosin hydrochloride] Swelling 09/24/2009   • Metformin Unspecified 07/23/2013   • Tape Rash 08/15/2012   • Vancomycin Itching 07/10/2016   • Wound dressing adhesive Hives 01/12/2018   • Cephalexin [keflex] Rash 01/01/2017   • Levofloxacin Unspecified 10/27/2016   • Metronidazole Rash 03/30/2017   • Valproic acid Rash 03/30/2017        ROS: Denies Chest pain, SOB, LE edema.    /70 (BP Location: Left arm, Patient Position: Sitting) Comment (BP Location): L. forearm  Pulse 91   Temp 36.7 °C (98 °F)   LMP 06/28/2019   SpO2 96%     Physical Exam:  Gen:         Alert and oriented, No apparent distress.  Neck:        No Lymphadenopathy or Bruits.  Lungs:     Clear to auscultation bilaterally  CV:          Regular rate and rhythm. No murmurs, rubs or gallops.               Ext:          No clubbing, cyanosis, edema.      Assessment and Plan.   28 y.o. female with the following issues.    1. Controlled type 2 diabetes mellitus without complication, without long-term current use of insulin (HCC)  Clinically doing well no change to therapy will see diabetic nurse in 2 weeks for titration of medication  - Comp Metabolic Panel; Future  - HEMOGLOBIN A1C; Future    2. Acquired hypothyroidism  Starting on medications rechecking thyroid in 2 months.  Counseled about side effects  - levothyroxine (SYNTHROID) 75 MCG Tab; Take 1 Tab by mouth Every morning on an empty stomach.  Dispense: 90 Tab; Refill: 1

## 2019-07-20 ENCOUNTER — OFFICE VISIT (OUTPATIENT)
Dept: URGENT CARE | Facility: CLINIC | Age: 29
End: 2019-07-20
Payer: MEDICARE

## 2019-07-20 VITALS
RESPIRATION RATE: 14 BRPM | TEMPERATURE: 97.5 F | WEIGHT: 279 LBS | OXYGEN SATURATION: 96 % | SYSTOLIC BLOOD PRESSURE: 112 MMHG | BODY MASS INDEX: 46.48 KG/M2 | HEIGHT: 65 IN | HEART RATE: 103 BPM | DIASTOLIC BLOOD PRESSURE: 70 MMHG

## 2019-07-20 DIAGNOSIS — L08.9 INFECTION OF CAT SCRATCH WOUND: ICD-10-CM
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
## 2019-07-20 PROCEDURE — 99214 OFFICE O/P EST MOD 30 MIN: CPT | Performed by: NURSE PRACTITIONER

## 2019-07-20 RX ORDER — AMOXICILLIN AND CLAVULANATE POTASSIUM 875; 125 MG/1; MG/1
1 TABLET, FILM COATED ORAL 2 TIMES DAILY
Qty: 14 TAB | Refills: 0 | Status: SHIPPED | OUTPATIENT
Start: 2019-07-20 | End: 2019-08-13

## 2019-07-20 RX ORDER — COLCHICINE 0.6 MG/1
0.6 TABLET ORAL DAILY
COMMUNITY
End: 2019-08-13

## 2019-07-20 ASSESSMENT — ENCOUNTER SYMPTOMS
NAUSEA: 0
HEADACHES: 0
CHILLS: 0
FEVER: 0
DIARRHEA: 0
DIZZINESS: 0
MYALGIAS: 0

## 2019-07-20 NOTE — PROGRESS NOTES
Subjective:      Kristin Balderrama is a 28 y.o. female who presents with Other (x 1 week.  Pt. states cat scratched her abdomen and she thinks its infected. She said it is leaking and is very painful. )            HPI new. 28 year old female with possible skin infection from cat scratch. She has lesion with yellow center on her abdomen for one week. Cat UTD on shots. She denies fever, chills, myalgia, nausea or headache. She does report increased SOB since this started but she is currently on O2 for other issues.  Cefdinir; Depakote [divalproex sodium]; Doxycycline; Abilify; Amitriptyline; Amoxicillin; Ciprofloxacin; Clindamycin; Ees [erythromycin]; Flagyl [metronidazole hcl]; Flomax [tamsulosin hydrochloride]; Metformin; Tape; Vancomycin; Wound dressing adhesive; Cephalexin [keflex]; Levofloxacin; Metronidazole; and Valproic acid  Current Outpatient Prescriptions on File Prior to Visit   Medication Sig Dispense Refill   • mycophenolate (CELLCEPT) 500 MG tablet Take 500 mg by mouth 2 times a day.     • levothyroxine (SYNTHROID) 75 MCG Tab Take 1 Tab by mouth Every morning on an empty stomach. 90 Tab 1   • sucralfate (CARAFATE) 1 GM Tab Take 1 Tab by mouth 4 Times a Day,Before Meals and at Bedtime for 14 days. 56 Tab 0   • gabapentin (NEURONTIN) 300 MG Cap Take 300 mg by mouth 4 times a day.     • Diclofenac Sodium 1 % Gel Apply 1 Application to skin as directed 4 times a day. Apply's on wrist, back, ankles, and feet.     • Dulaglutide (TRULICITY) 0.75 MG/0.5ML Solution Pen-injector Inject 0.75 mg as instructed every 7 days. On Friday     • FLUoxetine (PROZAC) 20 MG Cap Take 20 mg by mouth every evening.     • predniSONE (DELTASONE) 10 MG Tab Take 40 mg by mouth every day. Pt started on 7/3/2019  X 3 weeks then 25 mg daily x 3 weeks then 20 mg x 3 weeks then 15 mg daily as prescribed by Dr. Newton.      • ranitidine (ZANTAC) 300 MG tablet Take 1 Tab by mouth every day. 60 Tab 3   • ondansetron (ZOFRAN) 4 MG Tab  tablet Take 1 Tab by mouth every four hours as needed for Nausea/Vomiting. 20 Tab 3   • folic acid (FOLVITE) 1 MG Tab Take 1 Tab by mouth every day. 30 Tab 2   • ivabradine (CORLANOR) 5 MG Tab tablet Take 1.5 Tabs by mouth 2 times a day, with meals. 60 Tab 1   • sodium bicarbonate 325 MG Tab Take 325-650 mg by mouth 3 times a day. 650 mg in AM  325 mg mid-afternoon  650 mg at night     • albuterol 108 (90 Base) MCG/ACT Aero Soln inhalation aerosol Inhale 2 Puffs by mouth every 6 hours as needed for Shortness of Breath.     • Melatonin 5 MG Tab Take 10 mg by mouth every day.     • furosemide (LASIX) 80 MG Tab Take 80 mg by mouth every day.     • busPIRone (BUSPAR) 5 MG tablet TAKE ONE TABLET BY MOUTH TWICE DAILY 60 Tab 4   • phenazopyridine (PYRIDIUM) 200 MG Tab Take 1 Tab by mouth 3 times a day as needed. 30 Tab 2   • norethindrone (DEBLITANE) 0.35 MG tablet Take 0.35 mg by mouth every day.     • ziprasidone (GEODON) 80 MG Cap Take 80 mg by mouth 2 Times a Day.     • methocarbamol (ROBAXIN) 750 MG Tab Take 750 mg by mouth 3 times a day.     • traZODone (DESYREL) 100 MG Tab TAKE  ONE TABLET BY MOUTH NIGHTLY AT BEDTIME 90 Tab 2   • Diphenhydramine-APAP, sleep, (TYLENOL PM EXTRA STRENGTH PO) Take 2 Caps by mouth every day.     • LYRICA 300 MG capsule Take 300 mg by mouth 2 times a day.     • aspirin EC (ECOTRIN) 81 MG Tablet Delayed Response Take 1 Tab by mouth every day. 30 Tab 6   • methotrexate 2.5 MG Tab Take 7.5 mg by mouth every 7 days. On Sunday     • CIPROFLOXACIN HCL OT Place 5 Drops in right ear 2 Times a Day. Pt started on 7/1/2019 for 5 day course.       No current facility-administered medications on file prior to visit.      Social History     Social History   • Marital status: Single     Spouse name: N/A   • Number of children: N/A   • Years of education: N/A     Occupational History   • Not on file.     Social History Main Topics   • Smoking status: Never Smoker   • Smokeless tobacco: Never Used   •  "Alcohol use No   • Drug use: Yes     Frequency: 7.0 times per week     Types: Marijuana, Oral      Comment: Medicinal edible's   • Sexual activity: Not Currently     Birth control/ protection: Pill     Other Topics Concern   • Not on file     Social History Narrative    ** Merged History Encounter **          family history includes Genitourinary () in her sister; Heart Disease in her maternal grandmother and mother; Hypertension in her maternal grandmother, maternal uncle, and mother; No Known Problems in her sister; Other in her mother and sister; Sleep Apnea in her mother.      Review of Systems   Constitutional: Negative for chills, fever and malaise/fatigue.   Gastrointestinal: Negative for diarrhea and nausea.   Musculoskeletal: Negative for myalgias.   Skin: Positive for rash.   Neurological: Negative for dizziness and headaches.          Objective:     /70   Pulse (!) 103   Temp 36.4 °C (97.5 °F) (Temporal)   Resp 14   Ht 1.651 m (5' 5\")   Wt (!) 126.6 kg (279 lb)   LMP 06/28/2019   SpO2 96%   BMI 46.43 kg/m²      Physical Exam   Constitutional: She is oriented to person, place, and time. She appears well-developed and well-nourished. No distress.   Cardiovascular: Normal rate, regular rhythm and normal heart sounds.    No murmur heard.  Pulmonary/Chest: Effort normal and breath sounds normal.   Musculoskeletal: Normal range of motion.   Neurological: She is alert and oriented to person, place, and time.   Skin: Skin is warm and dry.   She has wound to right side of abdomen, no swelling or surrounding erythema, no induration or discharge. Yellow dried eschar versus discharge.   Psychiatric: She has a normal mood and affect. Her behavior is normal. Thought content normal.   Nursing note and vitals reviewed.              Assessment/Plan:     1. Infection of cat scratch wound  amoxicillin-clavulanate (AUGMENTIN) 875-125 MG Tab     Patient clearly states can take PCN with benadryl without " problem.  augmentin x 7 days.  Differential diagnosis, natural history, supportive care, and indications for immediate follow-up discussed at length.

## 2019-07-23 ENCOUNTER — PHYSICAL THERAPY (OUTPATIENT)
Dept: PHYSICAL THERAPY | Facility: REHABILITATION | Age: 29
End: 2019-07-23
Attending: INTERNAL MEDICINE
Payer: MEDICARE

## 2019-07-23 DIAGNOSIS — M25.579 ACUTE ANKLE PAIN, UNSPECIFIED LATERALITY: ICD-10-CM

## 2019-07-23 PROCEDURE — 97110 THERAPEUTIC EXERCISES: CPT

## 2019-07-23 NOTE — OP THERAPY DAILY TREATMENT
Outpatient Physical Therapy  DAILY TREATMENT     [facility] [address].  [address]  [place] [address]  Phone:  [phone]  Fax:  [phone]    Date: 07/23/2019    Patient: Kristin Balderrama  YOB: 1990  MRN: [id number]     Time Calculation  Start time: 1105  Stop time: 1135 Time Calculation (min): 30 minutes     Chief Complaint: Ankle Problem    Visit #: 6    SUBJECTIVE:  Had an injection to R ankle at [facility] since last visit, which helped with pain. Presents in R ankle brace AFO today with shoe. Requested crackers at start of visit as she had not had any breakfast.     OBJECTIVE:  Current objective measures:   Sp02 92% - 95%  HR 90bpm  Patient is not on supplemental 02 today. Presents to appointment with quad cane - WC is in car.         Therapeutic Exercises (CPT 84988):     1. STS 5 x 2 walker anteriorly    2. Gait training quad cane, difficulty coordinating stepping, frequent hip AP increased balance reactions, no improvement with cueing    3. Gait training 2WW, improved gait pattern, no LOB     4. Seated marching, 20 x 2    5. Long arc quad, 10 ea. x 2    6. Lateral weight shift 20 x 1, BUE support, mild pain with R weight shift,        Time-based treatments/modalities:  Therapeutic exercise minutes (CPT 25487): 23 minutes         ASSESSMENT:   Response to treatment: Poor gait pattern with use of quad cane - increased trunk AP reaction and asymmetric christina. Unable to coordinate timing of cane placement with cueing. Given patient's continued wall walking, and continued falls and difficulty using bariatric walker or WC in her home as door frames are too small, recommended to the patient that she use regular walker in lieu of home modifications. Per patient and her grandmothers reported at last visit that they have had prior home assessment by [facility], who informed them that the only modification that they could recommend would be widening all doors in the  home. They are not in a financial position to do this.     PLAN/RECOMMENDATIONS:   Plan for treatment: therapy treatment to continue next visit.  Planned interventions for next visit: continue with current treatment Reassess ankle ROM, strength, joint mobility.

## 2019-07-24 ENCOUNTER — NON-PROVIDER VISIT (OUTPATIENT)
Dept: MEDICAL GROUP | Facility: MEDICAL CENTER | Age: 29
End: 2019-07-24
Payer: MEDICARE

## 2019-07-24 ENCOUNTER — OFFICE VISIT (OUTPATIENT)
Dept: PULMONOLOGY | Facility: HOSPICE | Age: 29
End: 2019-07-24
Payer: MEDICARE

## 2019-07-24 VITALS
OXYGEN SATURATION: 96 % | BODY MASS INDEX: 46.48 KG/M2 | DIASTOLIC BLOOD PRESSURE: 70 MMHG | WEIGHT: 279 LBS | SYSTOLIC BLOOD PRESSURE: 136 MMHG | RESPIRATION RATE: 16 BRPM | HEIGHT: 65 IN | HEART RATE: 81 BPM

## 2019-07-24 VITALS
DIASTOLIC BLOOD PRESSURE: 70 MMHG | HEIGHT: 65 IN | BODY MASS INDEX: 46.98 KG/M2 | HEART RATE: 99 BPM | TEMPERATURE: 97.8 F | SYSTOLIC BLOOD PRESSURE: 104 MMHG | OXYGEN SATURATION: 94 % | WEIGHT: 282 LBS

## 2019-07-24 DIAGNOSIS — E66.01 MORBID OBESITY WITH BMI OF 45.0-49.9, ADULT (HCC): ICD-10-CM

## 2019-07-24 DIAGNOSIS — E11.9 CONTROLLED TYPE 2 DIABETES MELLITUS WITHOUT COMPLICATION, WITHOUT LONG-TERM CURRENT USE OF INSULIN (HCC): ICD-10-CM

## 2019-07-24 DIAGNOSIS — G47.33 OSA (OBSTRUCTIVE SLEEP APNEA): ICD-10-CM

## 2019-07-24 DIAGNOSIS — R09.02 EXERCISE HYPOXEMIA: ICD-10-CM

## 2019-07-24 DIAGNOSIS — R06.02 SOB (SHORTNESS OF BREATH): ICD-10-CM

## 2019-07-24 PROCEDURE — 99211 OFF/OP EST MAY X REQ PHY/QHP: CPT | Performed by: INTERNAL MEDICINE

## 2019-07-24 PROCEDURE — 99214 OFFICE O/P EST MOD 30 MIN: CPT | Performed by: INTERNAL MEDICINE

## 2019-07-24 RX ORDER — ZOLPIDEM TARTRATE 5 MG/1
5 TABLET ORAL NIGHTLY PRN
Qty: 3 TAB | Refills: 0 | Status: SHIPPED | OUTPATIENT
Start: 2019-07-24 | End: 2019-07-25

## 2019-07-24 NOTE — PROGRESS NOTES
"RN Visit    Subjective:     Reason for visit: Here for diabetes follow up.  She is on Prednisone 40 mg daily that has made her blood sugars go up.    Exercise: Not able to walk much.  Doing physical therapy exercises as tolerated.  Diet: 4 to 6 small meals. Prednisone making her hungry.  Patient's body mass index is 46.93 kg/m². Exercise and nutrition counseling were performed at this visit.      Glucose monitoring frequency: Testing daily  's  Hypoglycemic episodes: no    Objective:     /70   Pulse 99   Temp 36.6 °C (97.8 °F)   Ht 1.651 m (5' 5\")   Wt (!) 127.9 kg (282 lb)   LMP 06/28/2019   SpO2 94%   BMI 46.93 kg/m²   Monofilament: done   Monofilament testing with a 10 gram force: sensation intact: intact bilaterally  Visual Inspection: Feet without maceration, ulcers, fissures.  Feet swollen due to needing to take her lasix.  Pedal pulses: intact bilaterally    Plan:     Discussed and educated on:   - All medications, side effects and compliance (discussed carefully)  - Annual eye examinations at Ophthalmology  - Home glucose monitoring emphasized  - Weight control and daily exercise    Recommended medication changes: She would like to have her Trulicity increased to 1.5 mg weekly.    [facility]  "

## 2019-07-24 NOTE — PROGRESS NOTES
CC:  Chief Complaint   Patient presents with   • Establish Care     Ref by ELIANE Brody MD      Shortness of breath, hypoxemia, history of asthma    HPI:   The patient is a 28 y.o. female.  With complicated past medical history including morbid obesity, hypoxemia of unclear etiology, recent hospitalization for optic neuritis currently on immunosuppressive medications, history of asthma but not taking any inhaler, history of obstructive sleep apnea not treated, hypoxemia for the last 2 years, mostly wheelchair-bound secondary to joint pain and shortness of breath, history of pericarditis thought to be secondary to methotrexate scoliosis, schizoaffective disorder.  The patient came to the as a new patient however she was seen in our clinic few years ago for asthma.    The patient gets dyspnea on with minimal exertion.  She uses oxygen started using it for the last 2 years she denies any chronic cough or wheezing.  She does not use any inhaler.  We were not able to do exertional hypoxemia testing today because the patient has lower extremity joint pain and not able to walk today.    The patient had sleep apnea years ago and she was told she has obstructive sleep apnea she never treated for that.  She told me she has very loud snoring and she has gained a lot of weight since her previous sleep studies years ago.  The patient usually sleeps in the daytime and stays up at night.    ROS:   Constitutional: Denies fevers, chills, night sweats  Eyes: Denies vision loss, pain, drainage, double vision  Ears, Nose, Throat: Denies earache, difficulty hearing, tinnitus, nasal congestion, hoarseness  Cardiovascular: Denies chest pain, tightness, palpitations, orthopnea or edema  Respiratory: Please see HPI  Sleep: Please see HPI  GI: Denies heartburn, dysphagia, nausea, abdominal pain, diarrhea or constipation  : Denies frequent urination, hematuria, discharge or painful urination  Musculoskeletal: Denies back pain, painful joints,  "sore muscles  Neurological: Denies weakness or headaches  Skin: No rashes  All other ROS were negative except what mentioned in the HPI     Past Medical History:  Past Medical History:   Diagnosis Date   • Abdominal pain    • Anginal syndrome     random chest pain especially with tachycardia   • Apnea, sleep    • Arrhythmia     \"sinus tachycardia\", cariologist, Dr. Kumar; ablation 2/2016   • Arthritis     osteo   • ASTHMA     when around smoke   • Atrial fibrillation (HCC)    • Back pain    • Borderline personality disorder (HCC)    • Breath shortness     with tachycardia   • Bronchitis    • Cardiac arrhythmia    • Chickenpox    • Chronic UTI 9/18/2010   • Cough    • Daytime sleepiness    • Depression    • Diabetes (HCC)    • Diarrhea    • Disorder of thyroid    • Fall    • Fatigue    • Frequent headaches    • Gasping for breath    • Gynecological disorder     PCOS   • Headache(784.0)    • Heart burn    • History of falling    • Hypertension    • Indigestion    • Migraine    • 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HCC)    • 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HCC)    • Nausea    • Obesity    • Pain 08-15-12    back, 7/10   • Painful joint    • PCOS (polycystic ovarian syndrome)    • Pneumonia 2012   • Psychosis (HCC)    • Renal disorder     \"kidney disease, stage 1\" nephrologist, Dr. 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   • Urinary bladder disorder     Suprapubic cath   • Urinary incontinence    • Weakness    • Wears glasses                Social History:  Social History     Social History   • Marital status: Single     Spouse name: N/A   • Number of children: N/A   • Years of education: N/A     Occupational History   • Not on file.     Social History Main Topics   • Smoking status: Never Smoker   • Smokeless " tobacco: Never Used   • Alcohol use No   • Drug use: Yes     Frequency: 7.0 times per week     Types: Marijuana, Oral      Comment: Medicinal edible's   • Sexual activity: Not Currently     Birth control/ protection: Pill     Other Topics Concern   • Not on file     Social History Narrative    ** Merged History Encounter **                  Family History:  Family History   Problem Relation Age of Onset   • Hypertension Mother    • Sleep Apnea Mother    • Heart Disease Mother    • Other Mother         hypothryod   • Hypertension Maternal Uncle    • Heart Disease Maternal Grandmother    • Hypertension Maternal Grandmother    • No Known Problems Sister    • Other Sister         Narcolepsy;fibromyalsia;bone;nerve   • Genitourinary () Sister         endometriosis       Current Outpatient Prescriptions on File Prior to Visit   Medication Sig Dispense Refill   • colchicine (COLCRYS) 0.6 MG Tab Take 0.6 mg by mouth every day.     • amoxicillin-clavulanate (AUGMENTIN) 875-125 MG Tab Take 1 Tab by mouth 2 times a day. 14 Tab 0   • mycophenolate (CELLCEPT) 500 MG tablet Take 500 mg by mouth 2 times a day.     • levothyroxine (SYNTHROID) 75 MCG Tab Take 1 Tab by mouth Every morning on an empty stomach. 90 Tab 1   • sucralfate (CARAFATE) 1 GM Tab Take 1 Tab by mouth 4 Times a Day,Before Meals and at Bedtime for 14 days. 56 Tab 0   • gabapentin (NEURONTIN) 300 MG Cap Take 300 mg by mouth 4 times a day.     • Diclofenac Sodium 1 % Gel Apply 1 Application to skin as directed 4 times a day. Apply's on wrist, back, ankles, and feet.     • Dulaglutide (TRULICITY) 0.75 MG/0.5ML Solution Pen-injector Inject 0.75 mg as instructed every 7 days. On Friday     • FLUoxetine (PROZAC) 20 MG Cap Take 20 mg by mouth every evening.     • methotrexate 2.5 MG Tab Take 7.5 mg by mouth every 7 days. On Sunday     • predniSONE (DELTASONE) 10 MG Tab Take 40 mg by mouth every day. Pt started on 7/3/2019  X 3 weeks then 25 mg daily x 3 weeks then 20  mg x 3 weeks then 15 mg daily as prescribed by Dr. Newton.      • CIPROFLOXACIN HCL OT Place 5 Drops in right ear 2 Times a Day. Pt started on 7/1/2019 for 5 day course.     • ranitidine (ZANTAC) 300 MG tablet Take 1 Tab by mouth every day. 60 Tab 3   • ondansetron (ZOFRAN) 4 MG Tab tablet Take 1 Tab by mouth every four hours as needed for Nausea/Vomiting. 20 Tab 3   • folic acid (FOLVITE) 1 MG Tab Take 1 Tab by mouth every day. 30 Tab 2   • ivabradine (CORLANOR) 5 MG Tab tablet Take 1.5 Tabs by mouth 2 times a day, with meals. 60 Tab 1   • sodium bicarbonate 325 MG Tab Take 325-650 mg by mouth 3 times a day. 650 mg in AM  325 mg mid-afternoon  650 mg at night     • Melatonin 5 MG Tab Take 10 mg by mouth every day.     • furosemide (LASIX) 80 MG Tab Take 80 mg by mouth every day.     • busPIRone (BUSPAR) 5 MG tablet TAKE ONE TABLET BY MOUTH TWICE DAILY 60 Tab 4   • phenazopyridine (PYRIDIUM) 200 MG Tab Take 1 Tab by mouth 3 times a day as needed. 30 Tab 2   • norethindrone (DEBLITANE) 0.35 MG tablet Take 0.35 mg by mouth every day.     • ziprasidone (GEODON) 80 MG Cap Take 80 mg by mouth 2 Times a Day.     • methocarbamol (ROBAXIN) 750 MG Tab Take 750 mg by mouth 3 times a day.     • traZODone (DESYREL) 100 MG Tab TAKE  ONE TABLET BY MOUTH NIGHTLY AT BEDTIME 90 Tab 2   • Diphenhydramine-APAP, sleep, (TYLENOL PM EXTRA STRENGTH PO) Take 2 Caps by mouth every day.     • LYRICA 300 MG capsule Take 300 mg by mouth 2 times a day.     • aspirin EC (ECOTRIN) 81 MG Tablet Delayed Response Take 1 Tab by mouth every day. 30 Tab 6   • albuterol 108 (90 Base) MCG/ACT Aero Soln inhalation aerosol Inhale 2 Puffs by mouth every 6 hours as needed for Shortness of Breath.       No current facility-administered medications on file prior to visit.        Allergies:   Cefdinir; Depakote [divalproex sodium]; Doxycycline; Abilify; Amitriptyline; Amoxicillin; Ciprofloxacin; Clindamycin; Ees [erythromycin]; Flagyl [metronidazole hcl];  "Flomax [tamsulosin hydrochloride]; Metformin; Tape; Vancomycin; Wound dressing adhesive; Cephalexin [keflex]; Levofloxacin; Metronidazole; and Valproic acid        Vitals:    07/24/19 0939   Height: 1.651 m (5' 5\")   Weight: (!) 126.6 kg (279 lb)   Weight % change since last entry.: 0 %   BP: 136/70   Pulse: 81   BMI (Calculated): 46.43   Resp: 16           Physical Exam:  Appearance:  in no acute distress  HEENT: Normocephalic, atraumatic, white sclera, PERRLA, oropharynx clear  Neck: No adenopathy or masses  Respiratory: no intercostal retractions or accessory muscle use  Lungs auscultation: Clear to auscultation bilaterally  Cardiovascular: Regular rate rhythm. No murmurs, rubs or gallops.  No LE edema  Abdomen: soft, nondistended  Gait: Normal  Digits: No clubbing, cyanosis  Motor: No focal deficits  Orientation: Oriented to time, person and place      DATA:    Labs:  Lab Results   Component Value Date/Time    WBC 14.6 (H) 07/11/2019 02:07 AM    WBC 6.1 07/20/2010 11:00 AM    RBC 4.00 (L) 07/11/2019 02:07 AM    RBC 4.38 07/20/2010 11:00 AM    HEMOGLOBIN 12.4 07/11/2019 02:07 AM    HEMATOCRIT 37.2 07/11/2019 02:07 AM    MCV 93.0 07/11/2019 02:07 AM    MCV 93 07/20/2010 11:00 AM    MCH 31.0 07/11/2019 02:07 AM    MCH 30.1 07/20/2010 11:00 AM    MCHC 33.3 (L) 07/11/2019 02:07 AM    MPV 10.5 07/11/2019 02:07 AM    NEUTSPOLYS 60.00 07/11/2019 02:07 AM    LYMPHOCYTES 34.80 07/11/2019 02:07 AM    MONOCYTES 3.50 07/11/2019 02:07 AM    EOSINOPHILS 0.80 07/11/2019 02:07 AM    BASOPHILS 0.00 07/11/2019 02:07 AM    ANISOCYTOSIS 1+ 07/08/2019 04:04 PM      Lab Results   Component Value Date/Time    SODIUM 136 07/11/2019 02:07 AM    POTASSIUM 3.5 (L) 07/11/2019 02:07 AM    CHLORIDE 102 07/11/2019 02:07 AM    CO2 20 07/11/2019 02:07 AM    GLUCOSE 88 07/11/2019 02:07 AM    BUN 12 07/11/2019 02:07 AM    CREATININE 0.72 07/11/2019 02:07 AM    CREATININE 0.75 (L) 07/20/2010 11:00 AM    BUNCREATRAT 19 07/20/2010 11:00 AM    " GLOMRATE >59 07/20/2010 11:00 AM                  Diagnosis:  1. TONYA (obstructive sleep apnea)  Polysomnography, 4 or More    zolpidem (AMBIEN) 5 MG Tab    REFERRAL TO OTHER   2. SOB (shortness of breath)  PULMONARY FUNCTION TESTS -Test requested: Complete Pulmonary Function Test   3. Exercise hypoxemia  Exercise Test for Bronchospasm / 6-Minute Walk   4. Morbid obesity with BMI of 45.0-49.9, adult ([facility])  OBESITY COUNSELING (No Charge): Patient identified as having weight management issue.  Appropriate orders and counseling given.        Assessment and Plan   The patient probably has severe sleep apnea.  She was diagnosed with sleep apnea years ago she gained significant amount of weight after that.  She snores loudly when she sleeps.  She is always tired and sleepy.  Usually she sleeps from 3 PM to 10 PM and then stays up late and then sleep in early morning.    -I am going to refer the patient to the sleep clinic and ordered polysomnography.    Regarding the patient's shortness of breath and hypoxemia, she does have history of asthma however she does not wheeze and does not have a chronic cough.  Asthma would not explain why she is hypoxemic.  However we were not able to confirm her hypoxemia on exertion today because she was not able to walk due to leg pain.    Before we start any treatment for her asthma I am going to order pulmonary function test, 6-minute walking test and have the patient follow-up with us after that.    Counseling on obesity and weight loss and active lifestyle was provided today                  Return in about 6 weeks (around 9/4/2019).        This note was created using voice recognition software. I apologize for any overlooked obvious grammar or  vocabulary mistake

## 2019-07-25 ENCOUNTER — PHYSICAL THERAPY (OUTPATIENT)
Dept: PHYSICAL THERAPY | Facility: REHABILITATION | Age: 29
End: 2019-07-25
Attending: INTERNAL MEDICINE
Payer: MEDICARE

## 2019-07-25 ENCOUNTER — HOSPITAL ENCOUNTER (OUTPATIENT)
Facility: MEDICAL CENTER | Age: 29
End: 2019-07-25
Attending: NURSE PRACTITIONER
Payer: MEDICARE

## 2019-07-25 ENCOUNTER — OFFICE VISIT (OUTPATIENT)
Dept: URGENT CARE | Facility: CLINIC | Age: 29
End: 2019-07-25
Payer: MEDICARE

## 2019-07-25 VITALS
DIASTOLIC BLOOD PRESSURE: 80 MMHG | BODY MASS INDEX: 46.92 KG/M2 | OXYGEN SATURATION: 95 % | SYSTOLIC BLOOD PRESSURE: 115 MMHG | RESPIRATION RATE: 16 BRPM | WEIGHT: 281.97 LBS | HEART RATE: 106 BPM | TEMPERATURE: 98.2 F

## 2019-07-25 DIAGNOSIS — Z51.89 VISIT FOR WOUND CHECK: ICD-10-CM

## 2019-07-25 DIAGNOSIS — R10.9 FLANK PAIN: ICD-10-CM

## 2019-07-25 DIAGNOSIS — M25.579 ACUTE ANKLE PAIN, UNSPECIFIED LATERALITY: ICD-10-CM

## 2019-07-25 LAB
APPEARANCE UR: CLEAR
BILIRUB UR STRIP-MCNC: NORMAL MG/DL
COLOR UR AUTO: YELLOW
GLUCOSE UR STRIP.AUTO-MCNC: 250 MG/DL
KETONES UR STRIP.AUTO-MCNC: NORMAL MG/DL
LEUKOCYTE ESTERASE UR QL STRIP.AUTO: NORMAL
NITRITE UR QL STRIP.AUTO: NORMAL
PH UR STRIP.AUTO: 5.5 [PH] (ref 5–8)
PROT UR QL STRIP: NORMAL MG/DL
RBC UR QL AUTO: NORMAL
SP GR UR STRIP.AUTO: 1.02
UROBILINOGEN UR STRIP-MCNC: 0.2 MG/DL

## 2019-07-25 PROCEDURE — 97140 MANUAL THERAPY 1/> REGIONS: CPT

## 2019-07-25 PROCEDURE — 97110 THERAPEUTIC EXERCISES: CPT

## 2019-07-25 PROCEDURE — 99213 OFFICE O/P EST LOW 20 MIN: CPT | Performed by: NURSE PRACTITIONER

## 2019-07-25 PROCEDURE — 81002 URINALYSIS NONAUTO W/O SCOPE: CPT | Performed by: NURSE PRACTITIONER

## 2019-07-25 PROCEDURE — 87086 URINE CULTURE/COLONY COUNT: CPT

## 2019-07-25 ASSESSMENT — ENCOUNTER SYMPTOMS
FLANK PAIN: 1
SHORTNESS OF BREATH: 0
NAUSEA: 0
DIZZINESS: 0
EYE PAIN: 0
VOMITING: 0
BACK PAIN: 1
FEVER: 0
CHILLS: 0
SORE THROAT: 0
MYALGIAS: 0

## 2019-07-25 NOTE — OP THERAPY DAILY TREATMENT
"  Outpatient Physical Therapy  DAILY TREATMENT     [facility] [address]  [address]  [place] [address]  Phone:  [phone]  Fax:  [phone]    Date: 07/25/2019    Patient: Kristin Balderrama  YOB: 1990  MRN: [id number]     Time Calculation  Start time: 1100  Stop time: 1130 Time Calculation (min): 30 minutes     Chief Complaint: Ankle Problem    Visit #: 7    SUBJECTIVE:  No falls since last visit. Patient presents with 2WW and wearing slippers (no brace R ankle). Left the brace at home because \"it smells and I know you want to look at my ankle more today\".     OBJECTIVE:  Current objective measures:     TTP R ATFL, mild TTP R achilles just proximal to insertion, fibularis longus/brevis proximally.   Hypomobile R talo-crural joint, R subtalar joint all directions  L ankle strength:   Inversion/eversion/PF 3/5   DF 2/5     At rest:  HR 90bpm  Sp02 96%    After STS activitiy  HR 96bpm  Sp02 90%  Increased respiratory rate    After 2 min rest  Hr 88bpm  Sp02 95%  RR WFL    Therapeutic Exercises (CPT 63240):     2. STS to 2WW , 10 x 2, cueing for hand placement, eccentric control,     3. Gait training 2WW, improved gait pattern, no LOB     Therapeutic Treatments and Modalities:     1. Manual Therapy (CPT 60240), PA/AP glides grade II-III 20 x 2 ea., subtalar distraction grade II-III 10 x 2, STM R fibularis longus/brevis, PROM L ankle DF/PF, composite inversion/eversion minimal OP 20 ea. x 1, R ankle. Patient reported reduced symptom intensity with WB after treatment.     Time-based treatments/modalities:  Manual therapy minutes (CPT 88540): 16 minutes  Therapeutic exercise minutes (CPT 13590): 10 minutes       Patient education: Discussed strategies for increased safety and reduced fall risk. Reinforced recommendation to 2WW and wear either ankle brace or AFO at all times when ambulating or standing.     ASSESSMENT:   Response to treatment: Improved gait and WB " tolerance at this visit. Gait WFL with use of 2WW, reinforced strong recommendation that patient continue to use this device, and either AFO or ankle brace when standing/walking.     PLAN/RECOMMENDATIONS:   Plan for treatment: therapy treatment to continue next visit.  Planned interventions for next visit: continue with current treatment.  TUG. Navigating obstacles with 2WW. Ambulation w/2WW with gaze change. Monitor vitals.

## 2019-07-25 NOTE — PROGRESS NOTES
"Subjective:   Kristin Balderrama is a 28 y.o. female who presents for Wound Check and Flank Pain (left side, with dysuria )  Patient is a 28-year-old female who presents clinic today for evaluation of left-sided flank pain in which she is concerned of a possible kidney stone and wound check.  Patient was recently seen and evaluated 7/20 for a cat scratched.  Patient was started on Augmentin which she has been taking as directed.  She does note improvement of the wound however has mild discomfort which she was concerned which prompted her to come in today.  Patient states that she also was recently seen by her urologist 7/6 in which an incidental finding on CT of renal calculi were noted.  Patient states that left flank pain began yesterday and has become severe.  She denies any fevers, dysuria, nausea, vomiting.  She has been taking Tylenol with minimal relief in symptoms.      HPI  Review of Systems   Constitutional: Negative for chills and fever.   HENT: Negative for sore throat.    Eyes: Negative for pain.   Respiratory: Negative for shortness of breath.    Cardiovascular: Negative for chest pain.   Gastrointestinal: Negative for nausea and vomiting.   Genitourinary: Positive for flank pain. Negative for dysuria, hematuria and urgency.   Musculoskeletal: Positive for back pain. Negative for myalgias.   Skin: Negative for rash.        Cat scratch of abdomen, wound covered with bandage     Neurological: Negative for dizziness.     Allergies   Allergen Reactions   • Cefdinir Shortness of Breath and Itching     Tolerated 1/18/17  Tolerates ceftriaxone    • Depakote [Divalproex Sodium] Unspecified     Muscle spasms/muscle pain and weakness     • Doxycycline Anaphylaxis and Vomiting     RXN=unknown   • Abilify Unspecified     Headaches/muscle twitching     • Amitriptyline Unspecified     Headaches     • Amoxicillin Rash     Pt states \"I get a rash\".     • Ciprofloxacin Rash     Pt states \"I get a rash\".     • " "Clindamycin Nausea     Even with food     • Ees [Erythromycin] Vomiting and Nausea   • Flagyl [Metronidazole Hcl] Unspecified     \"eye problems\"     • Flomax [Tamsulosin Hydrochloride] Swelling   • Metformin Unspecified     Increased lactic acid      • Tape Rash     Tears skin off  coban with Tegaderm tape ok intermittently  RXN=ongoing   • Vancomycin Itching     Pt becomes flushed in face and chest.   RXN=7/10/16   • Wound Dressing Adhesive Hives     By pt report   • Cephalexin [Keflex] Rash     Pt states she gets a rash with this medication  Tolerates ceftriaxone   • Levofloxacin Unspecified     Leg muscle cramps   • Metronidazole Rash     .   • Valproic Acid Rash     .      Objective:   LMP 06/28/2019   Physical Exam   Constitutional: She is oriented to person, place, and time. She appears well-developed and well-nourished. No distress.   HENT:   Head: Normocephalic and atraumatic.   Right Ear: Tympanic membrane normal.   Left Ear: Tympanic membrane normal.   Nose: Nose normal. Right sinus exhibits no maxillary sinus tenderness and no frontal sinus tenderness. Left sinus exhibits no maxillary sinus tenderness and no frontal sinus tenderness.   Mouth/Throat: Uvula is midline, oropharynx is clear and moist and mucous membranes are normal. No posterior oropharyngeal edema, posterior oropharyngeal erythema or tonsillar abscesses. No tonsillar exudate.   Eyes: Pupils are equal, round, and reactive to light. Conjunctivae and EOM are normal. Right eye exhibits no discharge. Left eye exhibits no discharge.   Cardiovascular: Normal rate and regular rhythm.    No murmur heard.  Pulmonary/Chest: Effort normal and breath sounds normal. No respiratory distress.   Abdominal: Soft. Bowel sounds are normal. She exhibits no distension. There is no tenderness. There is CVA tenderness. There is no rigidity, no rebound, no guarding, no tenderness at McBurney's point and negative Coon's sign.   Neurological: She is alert and " oriented to person, place, and time. She has normal reflexes. No sensory deficit.   Skin: Skin is warm, dry and intact. Lesion noted.        Psychiatric: She has a normal mood and affect.     Vitals:    07/25/19 1643   BP: 115/80   Pulse: (!) 106   Resp: 16   Temp: 36.8 °C (98.2 °F)   SpO2: 95%           Assessment/Plan:        1. Visit for wound check     2. Flank pain  POCT Urinalysis    URINE CULTURE(NEW)     Results for orders placed or performed in visit on 07/25/19   POCT Urinalysis   Result Value Ref Range    POC Color YELLOW Negative    POC Appearance CLEAR Negative    POC Leukocyte Esterase NEG Negative    POC Nitrites NEG Negative    POC Urobiligen 0.2 Negative (0.2) mg/dL    POC Protein NEG Negative mg/dL    POC Urine PH 5.5 5.0 - 8.0    POC Blood NEG Negative    POC Specific Gravity 1.020 <1.005 - >1.030    POC Ketones TRAC Negative mg/dL    POC Bilirubin NEG Negative mg/dL    POC Glucose 250 Negative mg/dL     *Note: Due to a large number of results and/or encounters for the requested time period, some results have not been displayed. A complete set of results can be found in Results Review.     Patient is a 28-year-old female patient with a stated above.  Wound appears to be healing appropriately advised patient to continue taking full course of antibiotics.  Urinalysis negative for infection, no blood noted low suspicion of possible kidney stone.  Patient did have left-sided low back pain will send urine for culture to ensure no infectious etiology to rule out pyelonephritis.  Recommended Tylenol for pain.  Patient given precautionary s/sx that mandate immediate follow up and evaluation in the ED. Advised of risks of not doing so.    DDX, Supportive care, and indications for immediate follow-up discussed with patient.    Instructed to return to clinic or nearest emergency department if we are not available for any change in condition, further concerns, or worsening of symptoms.    The patient  demonstrated a good understanding and agreed with the treatment plan.

## 2019-07-28 LAB
BACTERIA UR CULT: NORMAL
SIGNIFICANT IND 70042: NORMAL
SITE SITE: NORMAL
SOURCE SOURCE: NORMAL

## 2019-07-30 ENCOUNTER — TELEPHONE (OUTPATIENT)
Dept: PULMONOLOGY | Facility: HOSPICE | Age: 29
End: 2019-07-30

## 2019-07-30 DIAGNOSIS — R06.02 SOB (SHORTNESS OF BREATH): ICD-10-CM

## 2019-07-31 ENCOUNTER — HOSPITAL ENCOUNTER (OUTPATIENT)
Facility: MEDICAL CENTER | Age: 29
End: 2019-07-31
Attending: PHYSICIAN ASSISTANT
Payer: MEDICARE

## 2019-07-31 ENCOUNTER — OFFICE VISIT (OUTPATIENT)
Dept: URGENT CARE | Facility: CLINIC | Age: 29
End: 2019-07-31
Payer: MEDICARE

## 2019-07-31 ENCOUNTER — PHYSICAL THERAPY (OUTPATIENT)
Dept: PHYSICAL THERAPY | Facility: REHABILITATION | Age: 29
End: 2019-07-31
Attending: INTERNAL MEDICINE
Payer: MEDICARE

## 2019-07-31 VITALS
BODY MASS INDEX: 46.82 KG/M2 | HEART RATE: 88 BPM | OXYGEN SATURATION: 95 % | DIASTOLIC BLOOD PRESSURE: 82 MMHG | SYSTOLIC BLOOD PRESSURE: 126 MMHG | HEIGHT: 65 IN | WEIGHT: 281 LBS | TEMPERATURE: 98.5 F | RESPIRATION RATE: 16 BRPM

## 2019-07-31 DIAGNOSIS — L08.9 INFECTED WOUND: ICD-10-CM

## 2019-07-31 DIAGNOSIS — T14.8XXA INFECTED WOUND: ICD-10-CM

## 2019-07-31 DIAGNOSIS — M25.579 ACUTE ANKLE PAIN, UNSPECIFIED LATERALITY: ICD-10-CM

## 2019-07-31 PROCEDURE — 97110 THERAPEUTIC EXERCISES: CPT

## 2019-07-31 PROCEDURE — 87205 SMEAR GRAM STAIN: CPT

## 2019-07-31 PROCEDURE — 97112 NEUROMUSCULAR REEDUCATION: CPT

## 2019-07-31 PROCEDURE — 99214 OFFICE O/P EST MOD 30 MIN: CPT | Performed by: PHYSICIAN ASSISTANT

## 2019-07-31 PROCEDURE — 87070 CULTURE OTHR SPECIMN AEROBIC: CPT

## 2019-07-31 RX ORDER — SULFAMETHOXAZOLE AND TRIMETHOPRIM 800; 160 MG/1; MG/1
1 TABLET ORAL 2 TIMES DAILY
Qty: 14 TAB | Refills: 0 | Status: SHIPPED | OUTPATIENT
Start: 2019-07-31 | End: 2019-08-07

## 2019-07-31 RX ORDER — FLUOXETINE 10 MG/1
CAPSULE ORAL
Qty: 30 CAP | Refills: 2 | Status: SHIPPED | OUTPATIENT
Start: 2019-07-31 | End: 2019-08-07

## 2019-07-31 ASSESSMENT — ENCOUNTER SYMPTOMS
COUGH: 0
PALPITATIONS: 0
CHILLS: 0
FEVER: 0
SHORTNESS OF BREATH: 0
HEADACHES: 0
VOMITING: 0
MYALGIAS: 0
NAUSEA: 0

## 2019-07-31 NOTE — PROGRESS NOTES
"Subjective:      Kristin Balderrama is a 28 y.o. female who presents with Sore            Patient is here for a wound on her abdomen. The patient reports her cat scratched her 1 week ago. She has noticed redness and swelling around the wound. She completed a 7 days course of Augmentin. She also has been using Bactroban ointment. She continues to have a red sore. She reports bloody drainage. She has no fever but reports chills and vomiting. She has multiple drug allergies. She states she has taken Bactrim in the past and has not had issues.      Past Medical History:   Diagnosis Date   • Abdominal pain    • Anginal syndrome     random chest pain especially with tachycardia   • Apnea, sleep    • Arrhythmia     \"sinus tachycardia\", cariologist, Dr. Kumar; ablation 2/2016   • Arthritis     osteo   • ASTHMA     when around smoke   • Atrial fibrillation (HCC)    • Back pain    • Borderline personality disorder (HCC)    • Breath shortness     with tachycardia   • Bronchitis    • Cardiac arrhythmia    • Chickenpox    • Chronic UTI 9/18/2010   • Cough    • Daytime sleepiness    • Depression    • Diabetes (HCC)    • Diarrhea    • Disorder of thyroid    • Fall    • Fatigue    • Frequent headaches    • Gasping for breath    • Gynecological disorder     PCOS   • Headache(784.0)    • Heart burn    • History of falling    • Hypertension    • Indigestion    • Migraine    • 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HCC)    • 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HCC)    • Nausea    • Obesity    • Pain 08-15-12    back, 7/10   • Painful joint    • PCOS (polycystic ovarian syndrome)    • Pneumonia 2012   • Psychosis (HCC)    • Renal disorder     \"kidney disease, stage 1\" nephrologist, Dr. 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   • " Urinary bladder disorder     Suprapubic cath   • Urinary incontinence    • Weakness    • Wears glasses        Past Surgical History:   Procedure Laterality Date   • MUSCLE BIOPSY Right 1/26/2017    Procedure: MUSCLE BIOPSY - THIGH;  Surgeon: Isidro Vigil M.D.;  Location: SURGERY [facility];  Service:    • GASTROSCOPY WITH BALLOON DILATATION N/A 1/4/2017    Procedure: GASTROSCOPY WITH DILATATION;  Surgeon: Torres Vargas M.D.;  Location: SURGERY [facility];  Service:    • BOWEL STIMULATOR INSERTION  7/13/2016    Procedure: BOWEL STIMULATOR INSERTION FOR PERMANENT INTERSTIM SACRAL IMPLANT;  Surgeon: Joe Noyola M.D.;  Location: SURGERY [facility];  Service:    • RECOVERY  1/27/2016    Procedure: CATH LAB EP STUDY WITH SINUS NODE MODIFICATION LA;  Surgeon: Recoveryonly Surgery;  Location: SURGERY PRE-POST PROC [facility]IT [facility];  Service:    • OTHER CARDIAC SURGERY  1/2016    cardiac ablation   • NEURO DEST FACET L/S W/IG SNGL  4/21/2015    Performed by Reza Tabor at [facility]   • LUMBAR FUSION ANTERIOR  8/21/2012    Performed by SUSIE SAWANT at SURGERY [facility]   • ALYSSA BY LAPAROSCOPY  8/29/2010    Performed by SHAYY JOHNS at [facility]   • LAMINOTOMY     • OTHER ABDOMINAL SURGERY     • TONSILLECTOMY      tonsillectomy       Family History   Problem Relation Age of Onset   • Hypertension Mother    • Sleep Apnea Mother    • Heart Disease Mother    • Other Mother         hypothryod   • Hypertension Maternal Uncle    • Heart Disease Maternal Grandmother    • Hypertension Maternal Grandmother    • No Known Problems Sister    • Other Sister         Narcolepsy;fibromyalsia;bone;nerve   • Genitourinary () Problems Sister         endometriosis       Allergies   Allergen Reactions   • Cefdinir Shortness of Breath and Itching     Tolerated 1/18/17  Tolerates ceftriaxone    • Depakote [Divalproex Sodium] Unspecified     Muscle spasms/muscle pain and weakness     •  "Doxycycline Anaphylaxis and Vomiting     RXN=unknown   • Abilify Unspecified     Headaches/muscle twitching     • Amitriptyline Unspecified     Headaches     • Amoxicillin Rash     Pt states \"I get a rash\".     • Ciprofloxacin Rash     Pt states \"I get a rash\".     • Clindamycin Nausea     Even with food     • Ees [Erythromycin] Vomiting and Nausea   • Flagyl [Metronidazole Hcl] Unspecified     \"eye problems\"     • Flomax [Tamsulosin Hydrochloride] Swelling   • Metformin Unspecified     Increased lactic acid      • Tape Rash     Tears skin off  coban with Tegaderm tape ok intermittently  RXN=ongoing   • Vancomycin Itching     Pt becomes flushed in face and chest.   RXN=7/10/16   • Wound Dressing Adhesive Hives     By pt report   • Cephalexin [Keflex] Rash     Pt states she gets a rash with this medication  Tolerates ceftriaxone   • Levofloxacin Unspecified     Leg muscle cramps   • Metronidazole Rash     .   • Valproic Acid Rash     .       Medications, Allergies, and current problem list reviewed today in Epic    Review of Systems   Constitutional: Negative for chills, fever and malaise/fatigue.   Respiratory: Negative for cough and shortness of breath.    Cardiovascular: Negative for chest pain, palpitations and leg swelling.   Gastrointestinal: Negative for nausea and vomiting.   Musculoskeletal: Negative for myalgias.   Skin:        Wound on lower abdomen    Neurological: Negative for headaches.     All other systems reviewed and are negative.        Objective:     /82   Pulse 88   Temp 36.9 °C (98.5 °F) (Temporal)   Resp 16   Ht 1.651 m (5' 5\")   Wt (!) 127.5 kg (281 lb)   SpO2 95%   BMI 46.76 kg/m²      Physical Exam   Constitutional: She is oriented to person, place, and time. She appears well-developed and well-nourished. No distress.   HENT:   Head: Normocephalic and atraumatic.   Eyes: Conjunctivae are normal.   Cardiovascular: Normal rate.   Pulmonary/Chest: Effort normal. No respiratory " distress.   Neurological: She is alert and oriented to person, place, and time. No cranial nerve deficit.   Skin:        Psychiatric: She has a normal mood and affect. Her behavior is normal. Judgment and thought content normal.               Assessment/Plan:     1. Infected wound    - sulfamethoxazole-trimethoprim (BACTRIM DS) 800-160 MG tablet; Take 1 Tab by mouth 2 times a day for 7 days.  Dispense: 14 Tab; Refill: 0  - mupirocin (BACTROBAN) 2 % Ointment; Apply 1 Application to affected area(s) 2 times a day.  Dispense: 1 Tube; Refill: 0  - ANAEROBIC/AEROBIC/GRAM STAIN       Differential diagnoses, Supportive care, and indications for immediate follow-up discussed with patient.   Instructed to return to clinic or nearest emergency department for any change in condition, further concerns, or worsening of symptoms.    The patient demonstrated a good understanding and agreed with the treatment plan.    Ruth Ann Ramesh P.A.-C.

## 2019-07-31 NOTE — OP THERAPY DAILY TREATMENT
Outpatient Physical Therapy  DAILY TREATMENT     [facility] Physical [address]  [address]  [place] [address]  Phone:  [phone]  Fax:  [phone]    Date: 07/31/2019    Patient: Kristin Balderrama  YOB: 1990  MRN: [id number]     Time Calculation  Start time: 1100  Stop time: 1130 Time Calculation (min): 30 minutes     Chief Complaint: Ankle Problem and Back Problem    Visit #: 8        Therapeutic Exercises (CPT 64474):     2. STS to 2WW , 10 x 2, cueing for hand placement, eccentric control,     3. Gait training 2WW, improved gait pattern, no LOB     Therapeutic Treatments and Modalities:     1. Neuromuscular Re-education (CPT 40568), Obstacle course- 2WW around cones approximating width of doorways at home, No LOB, successful navigation of obstacles.     Time-based treatments/modalities:  Therapeutic exercise minutes (CPT 63964): 15 minutes  Neuromusc re-ed, balance, coor, post minutes (CPT 59927): 10 minutes       Patient education: Reinforced strategies for improving safety - hinged brace and 2WW at all times when standing/ambulating.     ASSESSMENT:   Response to treatment: Steady improvement in safety in dynamic gait and static stance with consistent use of 2WW and hinged AFO. Reinforced recommendations to continue to use these devices at all times when standing/ambulating. Patient in agreement.   PLAN/RECOMMENDATIONS:   Plan for treatment: therapy treatment to continue next visit.  Planned interventions for next visit: continue with current treatment.  Nustepper. Shuttle.  Ambulation w/2WW with gaze change.

## 2019-08-01 LAB
GRAM STN SPEC: NORMAL
SIGNIFICANT IND 70042: NORMAL
SITE SITE: NORMAL
SOURCE SOURCE: NORMAL

## 2019-08-02 ENCOUNTER — PHYSICAL THERAPY (OUTPATIENT)
Dept: PHYSICAL THERAPY | Facility: REHABILITATION | Age: 29
End: 2019-08-02
Attending: INTERNAL MEDICINE
Payer: MEDICARE

## 2019-08-02 DIAGNOSIS — M25.579 ACUTE ANKLE PAIN, UNSPECIFIED LATERALITY: ICD-10-CM

## 2019-08-02 PROCEDURE — 97110 THERAPEUTIC EXERCISES: CPT

## 2019-08-02 NOTE — OP THERAPY DAILY TREATMENT
"  Outpatient Physical Therapy  DAILY TREATMENT     [facility] Physical Therapy [address]  [address]  [place] [address]  Phone:  [phone]  Fax:  [phone]    Date: 08/02/2019    Patient: Kristin Balderrama  YOB: 1990  MRN: [id number]     Time Calculation  Start time: 0905  Stop time: 0935 Time Calculation (min): 30 minutes       Chief Complaint: Ankle Injury    Visit #: 9    SUBJECTIVE:  \"I haven't been noticing the ankle at all. My back hurts and I feel like I might have a fever.     OBJECTIVE:  Current objective measures:   /91BPM  Sp02  93%  HR 90bpm  Temp: 97.1 F          Therapeutic Exercises (CPT 55123):     1. Nustepper L1 5 min    2. Shuttle 7 c. BLE 15 x 2, RLE 3c. 15 x 1    3. W/BUE support, standing marching 20 x 2, mini-squats 20 x 2, alternating unilateral hip abduction white band 20 x 2    6. Seated trunk rotation AROM, 1 min    Patient education: Discussed d/c planning. Recommend discharge at next visit, patient is in agreement.     Time-based treatments/modalities:  Therapeutic exercise minutes (CPT 94361): 25 minutes         ASSESSMENT:   Response to treatment: Improved gait pattern with increased gait speed and no longer antalgic gait. Tolerated increase in WB intensity in therex without provocation of ankle symptoms. Ready for discharge at next visit.     PLAN/RECOMMENDATIONS:   Plan for treatment: therapy treatment to continue next visit.   Planned interventions for next visit: Review/progress HEP and discharge.       "

## 2019-08-02 NOTE — OP THERAPY DAILY TREATMENT
"\"I haven't been noticing the ankle at all. My back hurts and I feel like I might have a fever.   /91BPM  Sp02  93%  HR 90bpm  Temp: 97.1 F    Nustepper L1 5 min  Shuttle 7 c. BLE 15 x 2, RLE 3c. 15 x 1        "

## 2019-08-05 ENCOUNTER — PHYSICAL THERAPY (OUTPATIENT)
Dept: PHYSICAL THERAPY | Facility: REHABILITATION | Age: 29
End: 2019-08-05
Attending: INTERNAL MEDICINE
Payer: MEDICARE

## 2019-08-05 DIAGNOSIS — M25.579 ACUTE ANKLE PAIN, UNSPECIFIED LATERALITY: ICD-10-CM

## 2019-08-05 PROCEDURE — 97110 THERAPEUTIC EXERCISES: CPT

## 2019-08-05 NOTE — OP THERAPY DISCHARGE SUMMARY
"  Outpatient Physical Therapy  DISCHARGE SUMMARY NOTE      [facility] Physical Therapy [address]  [address]  [place] [address]  Phone:  [phone]  Fax:  [phone]    Date of Visit: 08/05/2019    Patient: Kristin Balderrama  YOB: 1990  MRN: [id number]     Referring Provider: Torres Brody M.D.  [address] [place] [address] [address]  [place] [address]   Referring Diagnosis Pain in left ankle and joints of left foot [M25.572]     Physical Therapy Occurrence Codes    Date of onset of impairment:  6/8/19   Date physical therapy care plan established or reviewed:  6/17/19   Date physical therapy treatment started:  6/17/19          Functional Assessment Used  Lower Extremity Functional Scale Total: 42.5     Your patient is being discharged from Physical Therapy with the following comments:   · Goals met    Comments:  The patient was seen for 10 visits to physical therapy to address impairments/limitations associated with R ankle sprain. The patient has progressed well in PT, albeit slowly, with hospitalizations for other conditions slowing rate at which PT goals were achieved. Patient now ambulating Kip with 2WW with no recent falls and no R ankle pain. Ready for discharge.      Limitations Remaining:  Continued fall risk due to co-morbidities and impaired safety awareness.     Recommendations:  Strongly recommended that patient use her 2WW at all times when walking/transferring to reduce her risk of falls. WC as needed but no \"wall walking\". Patient is in agreement.     Ursula Escalera, PT    Date: 8/5/2019       "
Statement Selected

## 2019-08-05 NOTE — OP THERAPY DAILY TREATMENT
Outpatient Physical Therapy  DAILY TREATMENT     [facility] Physical [address]  [address]  [place] [address]  Phone:  [phone]  Fax:  [phone]    Date: 08/05/2019    Patient: Kristin Balderrama  YOB: 1990  MRN: [id number]     Time Calculation  Start time: 1210  Stop time: 1240 Time Calculation (min): 30 minutes       Chief Complaint: Ankle Problem    Visit #: 10    SUBJECTIVE:  No longer having any R ankle pain. No falls since last follow up. Is using the walker at home as recommended.     OBJECTIVE:  Current objective measures:   Lower Extremity Functional Scale Total: 42.5       Therapeutic Exercises (CPT 18348):     1. Nustepper L5, 5 min    2. BUE support, marching 1 min x 2, alternating unilateral hip abduction 1 min x 2, mini-squats 5 x 3    Patient education: Continue to use 2WW at all times when walking. Utilize w/c when needed for longer distances or when feeling unwell. Discussed increased fall risk and recommended continued use of AD at all times to reduce this risk. Patient in agreement.     Time-based treatments/modalities:  Therapeutic exercise minutes (CPT 54737): 20 minutes       Pain rating before treatment: 0  Pain rating after treatment: 0    ASSESSMENT:   Response to treatment: The patient has met all PT goals and is ready for discharge.      PLAN/RECOMMENDATIONS:   Plan for treatment: discharge patient due to accomplished goals.

## 2019-08-06 ENCOUNTER — HOSPITAL ENCOUNTER (OUTPATIENT)
Facility: MEDICAL CENTER | Age: 29
End: 2019-08-06
Attending: NURSE PRACTITIONER
Payer: MEDICARE

## 2019-08-06 ENCOUNTER — SLEEP STUDY (OUTPATIENT)
Dept: SLEEP MEDICINE | Facility: MEDICAL CENTER | Age: 29
End: 2019-08-06
Attending: INTERNAL MEDICINE
Payer: MEDICARE

## 2019-08-06 ENCOUNTER — OFFICE VISIT (OUTPATIENT)
Dept: URGENT CARE | Facility: CLINIC | Age: 29
End: 2019-08-06
Payer: MEDICARE

## 2019-08-06 VITALS
HEART RATE: 86 BPM | RESPIRATION RATE: 18 BRPM | WEIGHT: 281 LBS | HEIGHT: 65 IN | TEMPERATURE: 99.2 F | DIASTOLIC BLOOD PRESSURE: 64 MMHG | BODY MASS INDEX: 46.82 KG/M2 | SYSTOLIC BLOOD PRESSURE: 124 MMHG | OXYGEN SATURATION: 94 %

## 2019-08-06 DIAGNOSIS — G47.33 OSA (OBSTRUCTIVE SLEEP APNEA): ICD-10-CM

## 2019-08-06 DIAGNOSIS — T14.8XXA INFECTED WOUND: ICD-10-CM

## 2019-08-06 DIAGNOSIS — L08.9 INFECTED WOUND: ICD-10-CM

## 2019-08-06 DIAGNOSIS — R31.9 URINARY TRACT INFECTION WITH HEMATURIA, SITE UNSPECIFIED: ICD-10-CM

## 2019-08-06 DIAGNOSIS — N39.0 URINARY TRACT INFECTION WITH HEMATURIA, SITE UNSPECIFIED: ICD-10-CM

## 2019-08-06 DIAGNOSIS — R11.0 NAUSEA: ICD-10-CM

## 2019-08-06 DIAGNOSIS — R10.30 LOWER ABDOMINAL PAIN: ICD-10-CM

## 2019-08-06 LAB
APPEARANCE UR: NORMAL
BILIRUB UR STRIP-MCNC: NORMAL MG/DL
COLOR UR AUTO: NORMAL
GLUCOSE UR STRIP.AUTO-MCNC: NORMAL MG/DL
KETONES UR STRIP.AUTO-MCNC: NORMAL MG/DL
LEUKOCYTE ESTERASE UR QL STRIP.AUTO: NORMAL
NITRITE UR QL STRIP.AUTO: NORMAL
PH UR STRIP.AUTO: 6.5 [PH] (ref 5–8)
PROT UR QL STRIP: NORMAL MG/DL
RBC UR QL AUTO: NORMAL
SP GR UR STRIP.AUTO: 1.02
UROBILINOGEN UR STRIP-MCNC: 0.2 MG/DL

## 2019-08-06 PROCEDURE — 95810 POLYSOM 6/> YRS 4/> PARAM: CPT | Performed by: INTERNAL MEDICINE

## 2019-08-06 PROCEDURE — 81002 URINALYSIS NONAUTO W/O SCOPE: CPT | Performed by: NURSE PRACTITIONER

## 2019-08-06 PROCEDURE — 87086 URINE CULTURE/COLONY COUNT: CPT

## 2019-08-06 PROCEDURE — 99214 OFFICE O/P EST MOD 30 MIN: CPT | Performed by: NURSE PRACTITIONER

## 2019-08-06 RX ORDER — ONDANSETRON 2 MG/ML
4 INJECTION INTRAMUSCULAR; INTRAVENOUS ONCE
Status: COMPLETED | OUTPATIENT
Start: 2019-08-06 | End: 2019-08-06

## 2019-08-06 RX ORDER — NITROFURANTOIN 25; 75 MG/1; MG/1
100 CAPSULE ORAL EVERY 12 HOURS
Qty: 10 CAP | Refills: 0 | Status: CANCELLED | OUTPATIENT
Start: 2019-08-06 | End: 2019-08-11

## 2019-08-06 RX ADMIN — ONDANSETRON 4 MG: 2 INJECTION INTRAMUSCULAR; INTRAVENOUS at 19:50

## 2019-08-06 ASSESSMENT — ENCOUNTER SYMPTOMS
NAUSEA: 1
SHORTNESS OF BREATH: 0
DIZZINESS: 0
SORE THROAT: 0
MYALGIAS: 0
ABDOMINAL PAIN: 1
ROS SKIN COMMENTS: WOUND ON ABDOMEN
VOMITING: 1
FEVER: 0
FLANK PAIN: 0
EYE PAIN: 0
CHILLS: 0

## 2019-08-07 ENCOUNTER — OFFICE VISIT (OUTPATIENT)
Dept: BEHAVIORAL HEALTH | Facility: CLINIC | Age: 29
End: 2019-08-07
Payer: MEDICARE

## 2019-08-07 ENCOUNTER — APPOINTMENT (OUTPATIENT)
Dept: RADIOLOGY | Facility: MEDICAL CENTER | Age: 29
End: 2019-08-07
Attending: EMERGENCY MEDICINE
Payer: MEDICARE

## 2019-08-07 ENCOUNTER — HOSPITAL ENCOUNTER (EMERGENCY)
Facility: MEDICAL CENTER | Age: 29
End: 2019-08-07
Attending: EMERGENCY MEDICINE
Payer: MEDICARE

## 2019-08-07 VITALS
RESPIRATION RATE: 16 BRPM | HEIGHT: 65 IN | DIASTOLIC BLOOD PRESSURE: 68 MMHG | WEIGHT: 280 LBS | BODY MASS INDEX: 46.65 KG/M2 | SYSTOLIC BLOOD PRESSURE: 122 MMHG | HEART RATE: 82 BPM

## 2019-08-07 VITALS
SYSTOLIC BLOOD PRESSURE: 147 MMHG | DIASTOLIC BLOOD PRESSURE: 64 MMHG | WEIGHT: 280.2 LBS | OXYGEN SATURATION: 98 % | TEMPERATURE: 99.1 F | HEIGHT: 65 IN | BODY MASS INDEX: 46.69 KG/M2 | RESPIRATION RATE: 18 BRPM | HEART RATE: 78 BPM

## 2019-08-07 DIAGNOSIS — R10.84 GENERALIZED ABDOMINAL PAIN: ICD-10-CM

## 2019-08-07 DIAGNOSIS — F25.1 SCHIZOAFFECTIVE DISORDER, DEPRESSIVE TYPE (HCC): ICD-10-CM

## 2019-08-07 DIAGNOSIS — G47.00 INSOMNIA, UNSPECIFIED TYPE: ICD-10-CM

## 2019-08-07 DIAGNOSIS — N39.0 URINARY TRACT INFECTION WITH HEMATURIA, SITE UNSPECIFIED: ICD-10-CM

## 2019-08-07 DIAGNOSIS — F60.9 PERSONALITY DISORDER (HCC): ICD-10-CM

## 2019-08-07 DIAGNOSIS — S50.01XA CONTUSION OF RIGHT ELBOW, INITIAL ENCOUNTER: ICD-10-CM

## 2019-08-07 DIAGNOSIS — R31.9 URINARY TRACT INFECTION WITH HEMATURIA, SITE UNSPECIFIED: ICD-10-CM

## 2019-08-07 DIAGNOSIS — R10.30 LOWER ABDOMINAL PAIN: ICD-10-CM

## 2019-08-07 LAB
ALBUMIN SERPL BCP-MCNC: 4.3 G/DL (ref 3.2–4.9)
ALBUMIN/GLOB SERPL: 2.2 G/DL
ALP SERPL-CCNC: 29 U/L (ref 30–99)
ALT SERPL-CCNC: 39 U/L (ref 2–50)
ANION GAP SERPL CALC-SCNC: 8 MMOL/L (ref 0–11.9)
APPEARANCE UR: ABNORMAL
AST SERPL-CCNC: 16 U/L (ref 12–45)
BACTERIA #/AREA URNS HPF: ABNORMAL /HPF
BASOPHILS # BLD AUTO: 0.6 % (ref 0–1.8)
BASOPHILS # BLD: 0.04 K/UL (ref 0–0.12)
BILIRUB SERPL-MCNC: 0.5 MG/DL (ref 0.1–1.5)
BILIRUB UR QL STRIP.AUTO: NEGATIVE
BUN SERPL-MCNC: 10 MG/DL (ref 8–22)
CALCIUM SERPL-MCNC: 9.3 MG/DL (ref 8.5–10.5)
CAOX CRY #/AREA URNS HPF: ABNORMAL /HPF
CHLORIDE SERPL-SCNC: 108 MMOL/L (ref 96–112)
CO2 SERPL-SCNC: 22 MMOL/L (ref 20–33)
COLOR UR: ABNORMAL
CREAT SERPL-MCNC: 0.69 MG/DL (ref 0.5–1.4)
EOSINOPHIL # BLD AUTO: 0.04 K/UL (ref 0–0.51)
EOSINOPHIL NFR BLD: 0.6 % (ref 0–6.9)
EPI CELLS #/AREA URNS HPF: ABNORMAL /HPF
ERYTHROCYTE [DISTWIDTH] IN BLOOD BY AUTOMATED COUNT: 50.4 FL (ref 35.9–50)
GLOBULIN SER CALC-MCNC: 2 G/DL (ref 1.9–3.5)
GLUCOSE SERPL-MCNC: 101 MG/DL (ref 65–99)
GLUCOSE UR STRIP.AUTO-MCNC: NEGATIVE MG/DL
HCT VFR BLD AUTO: 40.7 % (ref 37–47)
HGB BLD-MCNC: 13.6 G/DL (ref 12–16)
HYALINE CASTS #/AREA URNS LPF: ABNORMAL /LPF
IMM GRANULOCYTES # BLD AUTO: 0.06 K/UL (ref 0–0.11)
IMM GRANULOCYTES NFR BLD AUTO: 1 % (ref 0–0.9)
KETONES UR STRIP.AUTO-MCNC: NEGATIVE MG/DL
LEUKOCYTE ESTERASE UR QL STRIP.AUTO: NEGATIVE
LIPASE SERPL-CCNC: 36 U/L (ref 11–82)
LYMPHOCYTES # BLD AUTO: 1.81 K/UL (ref 1–4.8)
LYMPHOCYTES NFR BLD: 29.3 % (ref 22–41)
MCH RBC QN AUTO: 31.9 PG (ref 27–33)
MCHC RBC AUTO-ENTMCNC: 33.4 G/DL (ref 33.6–35)
MCV RBC AUTO: 95.5 FL (ref 81.4–97.8)
MICRO URNS: ABNORMAL
MONOCYTES # BLD AUTO: 0.52 K/UL (ref 0–0.85)
MONOCYTES NFR BLD AUTO: 8.4 % (ref 0–13.4)
MUCOUS THREADS #/AREA URNS HPF: ABNORMAL /HPF
NEUTROPHILS # BLD AUTO: 3.71 K/UL (ref 2–7.15)
NEUTROPHILS NFR BLD: 60.1 % (ref 44–72)
NITRITE UR QL STRIP.AUTO: NEGATIVE
NRBC # BLD AUTO: 0 K/UL
NRBC BLD-RTO: 0 /100 WBC
PH UR STRIP.AUTO: 5.5 [PH] (ref 5–8)
PLATELET # BLD AUTO: 152 K/UL (ref 164–446)
PMV BLD AUTO: 11.1 FL (ref 9–12.9)
POTASSIUM SERPL-SCNC: 3.9 MMOL/L (ref 3.6–5.5)
PROT SERPL-MCNC: 6.3 G/DL (ref 6–8.2)
PROT UR QL STRIP: NEGATIVE MG/DL
RBC # BLD AUTO: 4.26 M/UL (ref 4.2–5.4)
RBC # URNS HPF: ABNORMAL /HPF
RBC UR QL AUTO: NEGATIVE
SODIUM SERPL-SCNC: 138 MMOL/L (ref 135–145)
SP GR UR STRIP.AUTO: 1.04
UROBILINOGEN UR STRIP.AUTO-MCNC: 1 MG/DL
WBC # BLD AUTO: 6.2 K/UL (ref 4.8–10.8)
WBC #/AREA URNS HPF: ABNORMAL /HPF

## 2019-08-07 PROCEDURE — 83690 ASSAY OF LIPASE: CPT

## 2019-08-07 PROCEDURE — 80053 COMPREHEN METABOLIC PANEL: CPT

## 2019-08-07 PROCEDURE — 81001 URINALYSIS AUTO W/SCOPE: CPT

## 2019-08-07 PROCEDURE — 85025 COMPLETE CBC W/AUTO DIFF WBC: CPT

## 2019-08-07 PROCEDURE — 96374 THER/PROPH/DIAG INJ IV PUSH: CPT

## 2019-08-07 PROCEDURE — 99213 OFFICE O/P EST LOW 20 MIN: CPT | Performed by: PSYCHIATRY & NEUROLOGY

## 2019-08-07 PROCEDURE — 90833 PSYTX W PT W E/M 30 MIN: CPT | Performed by: PSYCHIATRY & NEUROLOGY

## 2019-08-07 PROCEDURE — 700111 HCHG RX REV CODE 636 W/ 250 OVERRIDE (IP): Performed by: EMERGENCY MEDICINE

## 2019-08-07 PROCEDURE — 302875 HCHG BANDAGE ACE 4 OR 6""

## 2019-08-07 PROCEDURE — 302874 HCHG BANDAGE ACE 2 OR 3""

## 2019-08-07 PROCEDURE — 94760 N-INVAS EAR/PLS OXIMETRY 1: CPT

## 2019-08-07 PROCEDURE — 99285 EMERGENCY DEPT VISIT HI MDM: CPT

## 2019-08-07 PROCEDURE — 73080 X-RAY EXAM OF ELBOW: CPT | Mod: RT

## 2019-08-07 RX ORDER — KETOROLAC TROMETHAMINE 30 MG/ML
30 INJECTION, SOLUTION INTRAMUSCULAR; INTRAVENOUS ONCE
Status: COMPLETED | OUTPATIENT
Start: 2019-08-07 | End: 2019-08-07

## 2019-08-07 RX ORDER — FLUOXETINE HYDROCHLORIDE 20 MG/1
20 CAPSULE ORAL DAILY
Qty: 30 CAP | Refills: 2 | Status: SHIPPED | OUTPATIENT
Start: 2019-08-07 | End: 2019-09-20

## 2019-08-07 RX ADMIN — KETOROLAC TROMETHAMINE 30 MG: 30 INJECTION, SOLUTION INTRAMUSCULAR; INTRAVENOUS at 10:26

## 2019-08-07 ASSESSMENT — PATIENT HEALTH QUESTIONNAIRE - PHQ9
SUM OF ALL RESPONSES TO PHQ QUESTIONS 1-9: 15
CLINICAL INTERPRETATION OF PHQ2 SCORE: 6
5. POOR APPETITE OR OVEREATING: 1 - SEVERAL DAYS

## 2019-08-07 NOTE — ED PROVIDER NOTES
ED Provider Note    CHIEF COMPLAINT  Chief Complaint   Patient presents with   • Abdominal Pain   • UTI   • Fall   • Arm Pain       HPI  Kristin Balderrama is a 28 y.o. female who presents to the emergency department through triage with family member for abdominal pain.  Patient with some generalized lower abdominal pain, radiating to bilateral hips or flanks intermittently.  Malodorous urine without dysuria, frequency or urgency.  No fever chills.  No nausea or vomiting.  Patient does have history for UTIs and kidney stones previously.  Pain is unlike prior stones.    Additionally, patient states she had a mechanical ground-level fall this morning and landed up on her right elbow.  Abrasion.  However pain with range of motion.  Denies hand pain, shoulder pain, chest pain.  Denies head injury.    REVIEW OF SYSTEMS  See HPI for further details. All other systems are negative.     PAST MEDICAL HISTORY   has a past medical history of Abdominal pain, Anginal syndrome, Apnea, sleep, Arrhythmia, Arthritis, ASTHMA, Atrial fibrillation ([facility]), Back pain, Borderline personality disorder ([facility]), Breath shortness, Bronchitis, Cardiac arrhythmia, Chickenpox, Chronic UTI (9/18/2010), Cough, Daytime sleepiness, Depression, Diabetes ([facility]), Diarrhea, Disorder of thyroid, Fall, Fatigue, Frequent headaches, Gasping for breath, Gynecological disorder, Headache(784.0), Heart burn, History of falling, Hypertension, Indigestion, Migraine, 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    SOCIAL HISTORY  Social History     Tobacco Use   • Smoking status: Never Smoker   • Smokeless  "tobacco: Never Used   Substance and Sexual Activity   • Alcohol use: No     Alcohol/week: 0.0 oz   • Drug use: Yes     Frequency: 7.0 times per week     Types: Marijuana, Oral     Comment: Medicinal edible's   • Sexual activity: Not Currently     Birth control/protection: Pill       SURGICAL HISTORY   has a past surgical history that includes neuro dest facet l/s w/ig sngl (4/21/2015); recovery (1/27/2016); [place] by laparoscopy (8/29/2010); lumbar fusion anterior (8/21/2012); other cardiac surgery (1/2016); tonsillectomy; bowel stimulator insertion (7/13/2016); gastroscopy with balloon dilatation (N/A, 1/4/2017); muscle biopsy (Right, 1/26/2017); other abdominal surgery; and laminotomy.    CURRENT MEDICATIONS  Home Medications    **Home medications have not yet been reviewed for this encounter**         ALLERGIES  Allergies   Allergen Reactions   • Cefdinir Shortness of Breath and Itching     Tolerated 1/18/17  Tolerates ceftriaxone    • Depakote [Divalproex Sodium] Unspecified     Muscle spasms/muscle pain and weakness     • Doxycycline Anaphylaxis and Vomiting     RXN=unknown   • Abilify Unspecified     Headaches/muscle twitching     • Amitriptyline Unspecified     Headaches     • Amoxicillin Rash     Pt states \"I get a rash\".     • Ciprofloxacin Rash     Pt states \"I get a rash\".     • Clindamycin Nausea     Even with food     • Ees [Erythromycin] Vomiting and Nausea   • Flagyl [Metronidazole Hcl] Unspecified     \"eye problems\"     • Flomax [Tamsulosin Hydrochloride] Swelling   • Metformin Unspecified     Increased lactic acid      • Tape Rash     Tears skin off  coban with Tegaderm tape ok intermittently  RXN=ongoing   • Vancomycin Itching     Pt becomes flushed in face and chest.   RXN=7/10/16   • Wound Dressing Adhesive Hives     By pt report   • Cephalexin [Keflex] Rash     Pt states she gets a rash with this medication  Tolerates ceftriaxone   • Levofloxacin Unspecified     Leg muscle cramps   • " "Metronidazole Rash     .   • Valproic Acid Rash     .       PHYSICAL EXAM  VITAL SIGNS: /86   Pulse 87   Temp 37.3 °C (99.1 °F) (Temporal)   Resp 18   Ht 1.651 m (5' 5\")   Wt (!) 127.1 kg (280 lb 3.3 oz)   LMP 08/01/2019   SpO2 97%   BMI 46.63 kg/m²   Pulse ox interpretation: I interpret this pulse ox as normal.  Constitutional: Alert in no apparent distress.  Odd affect.  Cooperative.  Morbidly obese.    HENT: Normocephalic, atraumatic. Bilateral external ears normal, Nose normal. Moist mucous membranes.    Eyes: Pupils are equal and reactive, Conjunctiva normal.   Neck: Normal range of motion, Supple  Lymphatic: No lymphadenopathy noted.   Cardiovascular: Regular rate and rhythm, no murmurs. Distal pulses intact.    Thorax & Lungs: Normal breath sounds.  No wheezing/rales/ronchi. No increased work of breathing  Abdomen: Obese, soft, non-distended.  Mild generalized discomfort with palpation without rebound, guarding or peritonitis.  No McBurney or Coon point tenderness.  No CVA tenderness percussion.  Skin: Warm, Dry, No erythema, No rash.   Musculoskeletal: Superficial abrasion over the right elbow.  Pain with flexion or extension.  Tenderness to palpation over the lateral epicondyles.  Strong  however, full range of motion wrist, fingers and elbow without discomfort.  2+ radial pulse, sensation intact to light touch.  Neurologic: Alert and oriented x4.  Moves 4 extremity spontaneously although limited movement of right upper extremity due to discomfort.  Psychiatric: Odd affect, cooperative.    DIAGNOSTIC STUDIES / PROCEDURES      LABS  Results for orders placed or performed during the hospital encounter of 08/07/19   CBC WITH DIFFERENTIAL   Result Value Ref Range    WBC 6.2 4.8 - 10.8 K/uL    RBC 4.26 4.20 - 5.40 M/uL    Hemoglobin 13.6 12.0 - 16.0 g/dL    Hematocrit 40.7 37.0 - 47.0 %    MCV 95.5 81.4 - 97.8 fL    MCH 31.9 27.0 - 33.0 pg    MCHC 33.4 (L) 33.6 - 35.0 g/dL    RDW 50.4 (H) " 35.9 - 50.0 fL    Platelet Count 152 (L) 164 - 446 K/uL    MPV 11.1 9.0 - 12.9 fL    Neutrophils-Polys 60.10 44.00 - 72.00 %    Lymphocytes 29.30 22.00 - 41.00 %    Monocytes 8.40 0.00 - 13.40 %    Eosinophils 0.60 0.00 - 6.90 %    Basophils 0.60 0.00 - 1.80 %    Immature Granulocytes 1.00 (H) 0.00 - 0.90 %    Nucleated RBC 0.00 /100 WBC    Neutrophils (Absolute) 3.71 2.00 - 7.15 K/uL    Lymphs (Absolute) 1.81 1.00 - 4.80 K/uL    Monos (Absolute) 0.52 0.00 - 0.85 K/uL    Eos (Absolute) 0.04 0.00 - 0.51 K/uL    Baso (Absolute) 0.04 0.00 - 0.12 K/uL    Immature Granulocytes (abs) 0.06 0.00 - 0.11 K/uL    NRBC (Absolute) 0.00 K/uL   COMP METABOLIC PANEL   Result Value Ref Range    Sodium 138 135 - 145 mmol/L    Potassium 3.9 3.6 - 5.5 mmol/L    Chloride 108 96 - 112 mmol/L    Co2 22 20 - 33 mmol/L    Anion Gap 8.0 0.0 - 11.9    Glucose 101 (H) 65 - 99 mg/dL    Bun 10 8 - 22 mg/dL    Creatinine 0.69 0.50 - 1.40 mg/dL    Calcium 9.3 8.5 - 10.5 mg/dL    AST(SGOT) 16 12 - 45 U/L    ALT(SGPT) 39 2 - 50 U/L    Alkaline Phosphatase 29 (L) 30 - 99 U/L    Total Bilirubin 0.5 0.1 - 1.5 mg/dL    Albumin 4.3 3.2 - 4.9 g/dL    Total Protein 6.3 6.0 - 8.2 g/dL    Globulin 2.0 1.9 - 3.5 g/dL    A-G Ratio 2.2 g/dL   LIPASE   Result Value Ref Range    Lipase 36 11 - 82 U/L   URINALYSIS,CULTURE IF INDICATED   Result Value Ref Range    Color DK Yellow     Character Cloudy (A)     Specific Gravity 1.042 <1.035    Ph 5.5 5.0 - 8.0    Glucose Negative Negative mg/dL    Ketones Negative Negative mg/dL    Protein Negative Negative mg/dL    Bilirubin Negative Negative    Urobilinogen, Urine 1.0 Negative    Nitrite Negative Negative    Leukocyte Esterase Negative Negative    Occult Blood Negative Negative    Micro Urine Req Microscopic    URINE MICROSCOPIC (W/UA)   Result Value Ref Range    WBC 0-2 /hpf    RBC 0-2 /hpf    Bacteria Few (A) None /hpf    Epithelial Cells Few /hpf    Mucous Threads Moderate /hpf    Ca Oxalate Crystal Few /hpf     Hyaline Cast 0-2 /lpf   ESTIMATED GFR   Result Value Ref Range    GFR If African American >60 >60 mL/min/1.73 m 2    GFR If Non African American >60 >60 mL/min/1.73 m 2     *Note: Due to a large number of results and/or encounters for the requested time period, some results have not been displayed. A complete set of results can be found in Results Review.       RADIOLOGY  DX-ELBOW-COMPLETE 3+ RIGHT   Final Result      Minimal increased prominence of the anterior posterior fat pads without obvious fracture. Finding could be related to ligamentous injury however, occult fracture is not definitely excluded. Short-term follow-up imaging and orthopedic consult is    recommended.          COURSE & MEDICAL DECISION MAKING  Nursing notes and vital signs were reviewed. (See chart for details)  The patients records were reviewed, history was obtained from the patient;     ED evaluation for abdominal pain is unrevealing.  No leukocytosis, left shift or bandemia.  No electrolyte derangement.  Lipase normal.  Urinalysis unremarkable.  No clinical evidence for appendicitis, cholecystitis, pyelonephritis.  Symptomatology is atypical for ureteral colic or ovarian torsion per nonspecific pain resolved with Toradol.  Vital signs are stable without fever tachycardia, never hypotensive.  Tolerating oral fluids without nausea or vomiting.  Additionally, right elbow x-ray does show some increased prominence of anterior and posterior fat pads without obvious fracture, however given mechanism of injury persistent pain patient has been placed in a sugar tong splint, sling and to remain nonweightbearing until seen by orthopedics.  CMS otherwise intact distally.    Patient is stable for discharge at this time, anticipatory guidance provided, nonweightbearing right upper extremity, close follow-up is encouraged with primary care and orthopedics (LATIA, Dr. Mills on-call, and patient has been seen at this clinic multiple times previously)  this, and strict ED return instructions have been detailed. Patient is agreeable to the disposition and plan.    Patient's blood pressure was elevated in the emergency department, and has been referred to primary care for close monitoring.      FINAL IMPRESSION  (R10.84) Generalized abdominal pain  (S50.01XA) Contusion of right elbow, initial encounter      Electronically signed by: Junie Miranda, 8/7/2019 11:46 AM      This dictation was created using voice recognition software. The accuracy of the dictation is limited to the abilities of the software. I expect there may be some errors of grammar and possibly content. The nursing notes were reviewed and certain aspects of this information were incorporated into this note.

## 2019-08-07 NOTE — PROGRESS NOTES
"Subjective:   Kristin Balderrama is a 28 y.o. female who presents for Abdominal Pain (lower x today nausea and vomiting )        HPI  Review of Systems   Constitutional: Negative for chills and fever.   HENT: Negative for sore throat.    Eyes: Negative for pain.   Respiratory: Negative for shortness of breath.    Cardiovascular: Negative for chest pain.   Gastrointestinal: Positive for abdominal pain, nausea and vomiting.   Genitourinary: Negative for dysuria, flank pain, frequency, hematuria and urgency.   Musculoskeletal: Negative for myalgias.   Skin: Negative for rash.        Wound on abdomen   Neurological: Negative for dizziness.      Patient is a 28-year-old female who presents clinic today for evaluation of lower abdominal pain, nausea, vomiting onset proximally 1 hour prior to arrival.  Patient complaining of discomfort of the lower abdomen.  She denies any diarrhea, coffee-ground emesis, bloody stools.  Patient stating that the wounds that she has been evaluated for multiple times seems to be progressively worsening.  She denies any fever, chills.  Patient currently on Bactrim for the infection on her abdomen.  She denies any drainage from the abdomen.  She denies any flank pain.  Patient has not taken anything for the symptoms.  Patient does have a history of recurrent UTIs.  She denies any dysuria.  Allergies   Allergen Reactions   • Cefdinir Shortness of Breath and Itching     Tolerated 1/18/17  Tolerates ceftriaxone    • Depakote [Divalproex Sodium] Unspecified     Muscle spasms/muscle pain and weakness     • Doxycycline Anaphylaxis and Vomiting     RXN=unknown   • Abilify Unspecified     Headaches/muscle twitching     • Amitriptyline Unspecified     Headaches     • Amoxicillin Rash     Pt states \"I get a rash\".     • Ciprofloxacin Rash     Pt states \"I get a rash\".     • Clindamycin Nausea     Even with food     • Ees [Erythromycin] Vomiting and Nausea   • Flagyl [Metronidazole Hcl] Unspecified     " "\"eye problems\"     • Flomax [Tamsulosin Hydrochloride] Swelling   • Metformin Unspecified     Increased lactic acid      • Tape Rash     Tears skin off  coban with Tegaderm tape ok intermittently  RXN=ongoing   • Vancomycin Itching     Pt becomes flushed in face and chest.   RXN=7/10/16   • Wound Dressing Adhesive Hives     By pt report   • Cephalexin [Keflex] Rash     Pt states she gets a rash with this medication  Tolerates ceftriaxone   • Levofloxacin Unspecified     Leg muscle cramps   • Metronidazole Rash     .   • Valproic Acid Rash     .      Objective:   /64   Pulse 86   Temp 37.3 °C (99.2 °F)   Resp 18   Ht 1.651 m (5' 5\")   Wt (!) 127.5 kg (281 lb)   SpO2 94%   BMI 46.76 kg/m²   Physical Exam   Constitutional: She is oriented to person, place, and time. She appears well-developed and well-nourished. No distress.   HENT:   Head: Normocephalic and atraumatic.   Right Ear: Tympanic membrane normal.   Left Ear: Tympanic membrane normal.   Nose: Nose normal. Right sinus exhibits no maxillary sinus tenderness and no frontal sinus tenderness. Left sinus exhibits no maxillary sinus tenderness and no frontal sinus tenderness.   Mouth/Throat: Uvula is midline, oropharynx is clear and moist and mucous membranes are normal. No posterior oropharyngeal edema, posterior oropharyngeal erythema or tonsillar abscesses. No tonsillar exudate.   Eyes: Pupils are equal, round, and reactive to light. Conjunctivae and EOM are normal. Right eye exhibits no discharge. Left eye exhibits no discharge.   Cardiovascular: Normal rate and regular rhythm.   No murmur heard.  Pulmonary/Chest: Effort normal and breath sounds normal. No respiratory distress.   Abdominal: Soft. She exhibits no distension. There is no tenderness. There is no CVA tenderness.   Neurological: She is alert and oriented to person, place, and time. She has normal reflexes. No sensory deficit.   Skin: Skin is warm, dry and intact.        Psychiatric: She " has a normal mood and affect.         Assessment/Plan:     1. Nausea  POCT Urinalysis    ondansetron (ZOFRAN) syringe/vial injection 4 mg   2. Lower abdominal pain  URINE CULTURE(NEW)   3. Infected wound     4. Urinary tract infection with hematuria, site unspecified  URINE CULTURE(NEW)     Patient is a 28-year-old female presented with the stated above.  Wound of the abdomen mildly erythematous however patient currently taking Bactrim.  Urinalysis positive leukocytes, RBCs concerning of possible UTI despite being on oral antibiotics.  Will send urine for cultures follow-up with pending results.  Advised patient to continue taking prescribed Bactrim as directed.  Will trial injectable Zofran for nausea and vomiting.  Patient's vital signs stable, without fever no signs of systemic infection or sepsis.  Patient given precautionary s/sx that mandate immediate follow up and evaluation in the ED. Advised of risks of not doing so.    DDX, Supportive care, and indications for immediate follow-up discussed with patient.    Instructed to return to clinic or nearest emergency department if we are not available for any change in condition, further concerns, or worsening of symptoms.    The patient demonstrated a good understanding and agreed with the treatment plan.

## 2019-08-07 NOTE — ED NOTES
Cleared for DC per ER MD. DC instructions provided and patient verbalizes understanding. NAD noted. VSS. Patient amb with steady gait and denies pain after intervention.

## 2019-08-07 NOTE — ED TRIAGE NOTES
27 y/o female bib wheelchair for evaluation of abd pain and a urinary tract infection, pain began yesterday, it radiates around to her left flank area, c/o pain with urination, and a strong odor to her urine. Pt states she would also like her right elbow looked at because she fell this morning causing injury to the area.

## 2019-08-07 NOTE — PROCEDURES
The patient underwent a diagnostic polysomnogram using the standard montage for measurement of parameters of sleep, respiratory events, movement abnormalities, and heart rate and rhythm.    A microphone was used to monitor snoring.  Interpretation:  Study start time was 09:55:35 PM.  Diagnostic recording time was 7h 9.5m with a total sleep time of 6h 4.5m resulting in a sleep efficiency of 84.87%%.    Sleep latency from the start of the study was 01 minutes and the latency from sleep to REM was 173 minutes.  In total,43  arousals were scored for an arousal index of 7.1.  Respiratory:  There were a total of 17 apneas consisting of 17 obstructive apneas, 0 mixed apneas, and 0 central apneas.  A total of 60 hypopneas were scored.  The apnea index was 2.80 per hour and the hypopnea index was 9.88 per hour resulting in an overall AHI of 12.67.  AHI during rem was 54.4 and AHI while supine was 12.94.  Oximetry:  There was a mean oxygen saturation of 87.0% with a minimum oxygen saturation of 69.0%.  Time spent with oxygen saturations below 89% was 318.2 minutes.  Cardiac:  The highest heart rate seen while awake was 109 BPM while the highest heart rate during sleep was 108 BPM with an average sleeping heart rate of 93 BPM.  Limb Movements:  There were a total of 1066 PLMs during sleep, of which 4 were PLMS arousals.  This resulted in a PLMS index of 175.5 and a PLMS arousal index of 0.7.    The patient slept supine throughout testing.  The sleep efficiency was normal at 85%.  Sleep architecture showed reduction in stage III sleep.  Sleep latency was reduced at 1 minute.  EKG showed normal sinus rhythm.  PLM index of 175/h.    Once asleep the patient had frequent loud snoring, associated with obstructive apneas and hypopneas.  The overall AHI was 12.7/h and associated with desaturations to a yosvany of 69% and REM sleep.  She spent 91% of the time below SPO2 of 90%.    Split-night criteria was not  met.    Interpretation:  Mild obstructive sleep apnea, AHI: 12.7 with desaturations to a yosvany of 69% SPO2.  Periodic limb movement disorder (PLMD).    Recommendations:  Treatment options for TONYA include CPAP, an oral appliance or potentially UPPP.  The patient should follow-up in the sleep clinic to discuss appropriate therapy.  Verify serum ferritin level for periodic limb movement disorder.  Following treatment of TONYA, consider reevaluating PLMD which may be a secondary sleep disorder warranting pharmacologic therapy.  In general, avoidance of alcohol, smoking, sedatives and muscle relaxants advised as these can exacerbate sleep apnea.

## 2019-08-07 NOTE — BH THERAPY
[facility]  PSYCHIATRIC FOLLOW-UP NOTE    Name: Kristin Balderrama  MRN: [id number]  : 1990  Age: 28 y.o.  Date of assessment: 2019  PCP: Torres Brody M.D.  Persons in attendance: Patient  Total face-to-face time: 30 minutes    REASON FOR VISIT/CHIEF COMPLAINT (as stated by Patient):  Kristin Balderrama is a 28 y.o., White female, attending follow-up appointment for   Chief Complaint   Patient presents with   • Depressed     The patient is seen today for follow up on her schzoaffective disorder, personality disorder, and insomnia.   .      HISTORY OF PRESENT ILLNESS:    This is a 27 yo female, single, lives with her grandmother, seen today for follow up after two months. The patient had a fall and she injured her right elbow. The patient reported that she has been depressed for three months and she increased prozac 20 mg two days ago. The patient is in pain all the time. The patient feeling sad for no reason, decreased motivation and psychomotor retardation. The patient denied any suicidal thoughts. The patient has been taking geodon 80 mg bid, buspar 5 mg bid, and trazodone 100 mg at bed time for sleep.     PSYCHOSOCIAL CHANGES SINCE PREVIOUS CONTACT:  The patient has been depressed for two months.    RESPONSE TO TREATMENT:  She is taking geodon 80 mg bid, trazodone 100 mg one at night, buspar 5 mg bid and prozac 20 mg one a day.    MEDICATION SIDE EFFECTS:  Weight gain.    REVIEW OF SYSTEMS:        Constitutional positive - obesity.   Eyes positive - history of optic neuritis.   Ears/Nose/Mouth/Throat negative   Cardiovascular positive - hypertension, pericarditis.   Respiratory positive - obstructive sleep apnea.   Gastrointestinal positive - polycystic ovarian syndrome.   Genitourinary negative   Muscular negative   Integumentary positive - chronic inflammatory arthritis.    Neurological positive - neuropathy.   Endocrine positive - diabtes, hypothyroidism.   Hematologic/Lymphatic  "negative       PSYCHIATRIC EXAMINATION/MENTAL STATUS  /68   Pulse 82   Resp 16   Ht 1.651 m (5' 5\")   Wt (!) 127 kg (280 lb)   LMP 08/01/2019   BMI 46.59 kg/m²   Participation: Active verbal participation, Attentive and Engaged  Grooming:Casual  Orientation: Alert and Fully Oriented  Eye contact: Good  Behavior:Calm   Mood: Depressed  Affect: Sad and Anxious  Thought process: Logical and Goal-directed  Thought content:  Within normal limits  Speech: Hypotalkative  Perception:  Within normal limits  Memory:  No gross evidence of memory deficits  Insight: Adequate  Judgment: Adequate  Family/couple interaction observations:   Other:    Current risk:    Suicide: Low   Homicide: Low   Self-harm: Low  Relapse: Low  Other:   Crisis Safety Plan reviewed?Yes  If evidence of imminent risk is present, intervention/plan:    Medical Records/Labs/Diagnostic Tests Reviewed: yes.    Medical Records/Labs/Diagnostic Tests Ordered: none.    DIAGNOSTIC IMPRESSION(S):  1. Schizoaffective disorder, depressive type (HCC)    2. Personality disorder (HCC)    3. Insomnia, unspecified type           ASSESSMENT AND PLAN:    1. Schizoaffective disorder, depressed type.  2. Personality disorder.  Increase fluoxetine 20 mg one a day # 30 refills two.  Geodon 80 mg bid  Buspar 5 mg bid.    3 insomnia.  Trazodone 100 mg one at night.    4. Obesity  Diet and exercise as tolerated.    5. Follow up with the new provider in two months.  We discussed termination of treatment with this provider today.    17 minutes were spent in psychotherapy.  (If greater than 16 minutes, add [id number] code)   Topics addressed in psychotherapy include:  Psychoeducation and cognitive clarifications.  Helped her with social skills, self control, and problem solving skills.  Encouraged her to keep her daily routine and a good sleep cycle.    Ronny Burnette M.D.                   "

## 2019-08-09 ENCOUNTER — OFFICE VISIT (OUTPATIENT)
Dept: MEDICAL GROUP | Facility: MEDICAL CENTER | Age: 29
End: 2019-08-09
Payer: MEDICARE

## 2019-08-09 VITALS
HEIGHT: 65 IN | OXYGEN SATURATION: 96 % | SYSTOLIC BLOOD PRESSURE: 124 MMHG | WEIGHT: 291 LBS | TEMPERATURE: 98.9 F | BODY MASS INDEX: 48.48 KG/M2 | HEART RATE: 91 BPM | DIASTOLIC BLOOD PRESSURE: 70 MMHG

## 2019-08-09 DIAGNOSIS — M79.672 PAIN IN BOTH FEET: ICD-10-CM

## 2019-08-09 DIAGNOSIS — M79.671 PAIN IN BOTH FEET: ICD-10-CM

## 2019-08-09 DIAGNOSIS — S42.401A CLOSED FRACTURE OF RIGHT ELBOW, INITIAL ENCOUNTER: ICD-10-CM

## 2019-08-09 DIAGNOSIS — S21.009D BREAST WOUND, UNSPECIFIED LATERALITY, SUBSEQUENT ENCOUNTER: ICD-10-CM

## 2019-08-09 LAB
BACTERIA UR CULT: NORMAL
SIGNIFICANT IND 70042: NORMAL
SITE SITE: NORMAL
SOURCE SOURCE: NORMAL

## 2019-08-09 PROCEDURE — 99214 OFFICE O/P EST MOD 30 MIN: CPT | Performed by: INTERNAL MEDICINE

## 2019-08-09 NOTE — PROGRESS NOTES
CC: Elbow pain, foot pain, breast wounds.                                                                                                                                      HPI:   Kristin presents today with the following.    1. Closed fracture of right elbow, initial encounter  Recently presented to the emergency room after ground-level fall she did present to orthopedics and does have a small fracture of her right elbow.  Pain is present but tolerable she is in a sling.    2. Pain in both feet  Having bilateral pain in the bottom her feet 6 out of 10 in intensity requesting medications for anti-inflammatories.  She has had plantar fasciitis in the past.    3. Breast wound, unspecified laterality, subsequent encounter  Is on breast and abdomen persistently opening not currently infected would like referral back to wound care.      Patient Active Problem List    Diagnosis Date Noted   • Left leg weakness 07/14/2018     Priority: High   • Controlled type 2 diabetes mellitus without complication, without long-term current use of insulin ([facility]) 04/26/2017     Priority: High   • Sinus tachycardia 10/31/2013     Priority: High   • Chronic inflammatory arthritis 05/23/2016     Priority: Medium   • Morbid obesity with BMI of 45.0-49.9, adult ([facility]) 10/24/2017     Priority: Low   • Depression 10/28/2016     Priority: Low   • Schizophrenia ([facility]) 10/27/2016     Priority: Low   • PCOS (polycystic ovarian syndrome) 11/23/2015     Priority: Low   • Progressive focal motor weakness 06/28/2015     Priority: Low   • Fatty liver disease, nonalcoholic 01/19/2015     Priority: Low   • Anxiety 12/16/2014     Priority: Low   • Knee pain, right 02/13/2014     Priority: Low   • Neurogenic bladder 04/02/2011     Priority: Low   • Recurrent UTI 09/18/2010     Priority: Low   • Borderline personality disorder in adult ([facility]) 09/18/2010     Priority: Low   • Moderate intermittent asthma without complication 06/20/2019   • Optic neuritis  05/27/2019   • Inappropriate sinus tachycardia 04/10/2019   • Palpitations 10/01/2018   • Chronic respiratory failure with hypoxia, on home oxygen therapy ([facility]) 08/08/2018   • Transaminitis 08/08/2018   • TONYA (obstructive sleep apnea) 01/09/2018   • Functional diarrhea 01/05/2018   • Breast wound 11/06/2017   • Intractable episodic cluster headache 09/14/2017   • Acquired hypothyroidism 08/04/2017   • Hashimoto's encephalopathy 05/17/2017   • On home oxygen therapy 04/15/2017   • Weakness of right upper extremity 02/23/2017   • Hypovitaminosis D 11/29/2016   • Chronic pain syndrome 10/27/2016   • Bowel and bladder incontinence 10/27/2016   • Galactorrhea 07/22/2016   • Elevated sedimentation rate 06/27/2016   • Weakness of both lower extremities 06/22/2016   • Vitamin D deficiency 05/21/2016   • Morbidly obese ([facility]) 03/07/2016   • Scoliosis 03/07/2016   • GERD (gastroesophageal reflux disease) 03/07/2016   • Peripheral neuropathy ([facility]) 03/06/2016   • H/O prior ablation treatment 02/10/2016       Current Outpatient Medications   Medication Sig Dispense Refill   • etonogestrel (NEXPLANON) 68 MG Implant implant Inject 1 Each as instructed Once.     • FLUoxetine (PROZAC) 20 MG Cap Take 1 Cap by mouth every day. 30 Cap 2   • mupirocin (BACTROBAN) 2 % Ointment Apply 1 Application to affected area(s) 2 times a day. 1 Tube 0   • colchicine (COLCRYS) 0.6 MG Tab Take 0.6 mg by mouth every day.     • amoxicillin-clavulanate (AUGMENTIN) 875-125 MG Tab Take 1 Tab by mouth 2 times a day. 14 Tab 0   • mycophenolate (CELLCEPT) 500 MG tablet Take 500 mg by mouth 2 times a day.     • levothyroxine (SYNTHROID) 75 MCG Tab Take 1 Tab by mouth Every morning on an empty stomach. 90 Tab 1   • gabapentin (NEURONTIN) 300 MG Cap Take 300 mg by mouth 4 times a day.     • Diclofenac Sodium 1 % Gel Apply 1 Application to skin as directed 4 times a day. Apply's on wrist, back, ankles, and feet.     • Dulaglutide (TRULICITY) 0.75 MG/0.5ML  Solution Pen-injector Inject 0.75 mg as instructed every 7 days. On Friday     • predniSONE (DELTASONE) 10 MG Tab Take 40 mg by mouth every day. Pt started on 7/3/2019  X 3 weeks then 25 mg daily x 3 weeks then 20 mg x 3 weeks then 15 mg daily as prescribed by Dr. Newton.      • ranitidine (ZANTAC) 300 MG tablet Take 1 Tab by mouth every day. 60 Tab 3   • ondansetron (ZOFRAN) 4 MG Tab tablet Take 1 Tab by mouth every four hours as needed for Nausea/Vomiting. 20 Tab 3   • folic acid (FOLVITE) 1 MG Tab Take 1 Tab by mouth every day. 30 Tab 2   • ivabradine (CORLANOR) 5 MG Tab tablet Take 1.5 Tabs by mouth 2 times a day, with meals. 60 Tab 1   • sodium bicarbonate 325 MG Tab Take 325-650 mg by mouth 3 times a day. 650 mg in AM  325 mg mid-afternoon  650 mg at night     • albuterol 108 (90 Base) MCG/ACT Aero Soln inhalation aerosol Inhale 2 Puffs by mouth every 6 hours as needed for Shortness of Breath.     • Melatonin 5 MG Tab Take 10 mg by mouth every day.     • furosemide (LASIX) 80 MG Tab Take 80 mg by mouth every day.     • busPIRone (BUSPAR) 5 MG tablet TAKE ONE TABLET BY MOUTH TWICE DAILY 60 Tab 4   • phenazopyridine (PYRIDIUM) 200 MG Tab Take 1 Tab by mouth 3 times a day as needed. 30 Tab 2   • norethindrone (DEBLITANE) 0.35 MG tablet Take 0.35 mg by mouth every day.     • ziprasidone (GEODON) 80 MG Cap Take 80 mg by mouth 2 Times a Day.     • methocarbamol (ROBAXIN) 750 MG Tab Take 750 mg by mouth 3 times a day.     • traZODone (DESYREL) 100 MG Tab TAKE  ONE TABLET BY MOUTH NIGHTLY AT BEDTIME 90 Tab 2   • Diphenhydramine-APAP, sleep, (TYLENOL PM EXTRA STRENGTH PO) Take 2 Caps by mouth every day.     • LYRICA 300 MG capsule Take 300 mg by mouth 2 times a day.     • aspirin EC (ECOTRIN) 81 MG Tablet Delayed Response Take 1 Tab by mouth every day. 30 Tab 6   • Dulaglutide 1.5 MG/0.5ML Solution Pen-injector Inject 1.5 mg as instructed every 7 days. (Patient not taking: Reported on 8/6/2019) 4 PEN 11     No  "current facility-administered medications for this visit.          Allergies as of 08/09/2019 - Reviewed 08/09/2019   Allergen Reaction Noted   • Cefdinir Shortness of Breath and Itching 03/01/2016   • Depakote [divalproex sodium] Unspecified 06/14/2010   • Doxycycline Anaphylaxis and Vomiting 08/15/2012   • Abilify Unspecified 01/17/2013   • Amitriptyline Unspecified 10/31/2013   • Amoxicillin Rash 09/18/2010   • Ciprofloxacin Rash 12/17/2009   • Clindamycin Nausea 02/02/2011   • Ees [erythromycin] Vomiting and Nausea 08/28/2010   • Flagyl [metronidazole hcl] Unspecified 03/31/2011   • Flomax [tamsulosin hydrochloride] Swelling 09/24/2009   • Metformin Unspecified 07/23/2013   • Tape Rash 08/15/2012   • Vancomycin Itching 07/10/2016   • Wound dressing adhesive Hives 01/12/2018   • Cephalexin [keflex] Rash 01/01/2017   • Levofloxacin Unspecified 10/27/2016   • Metronidazole Rash 03/30/2017   • Valproic acid Rash 03/30/2017        ROS: Denies Chest pain, SOB, LE edema.    /70 (BP Location: Left arm, Patient Position: Sitting) Comment (BP Location): forearm  Pulse 91   Temp 37.2 °C (98.9 °F)   Ht 1.651 m (5' 5\")   Wt (!) 132 kg (291 lb)   LMP 08/01/2019   SpO2 96%   BMI 48.42 kg/m²     Physical Exam:  Gen:         Alert and oriented, No apparent distress.  Neck:        No Lymphadenopathy or Bruits.  Lungs:     Clear to auscultation bilaterally  CV:          Regular rate and rhythm. No murmurs, rubs or gallops.               Ext:          No clubbing, cyanosis, edema.      Assessment and Plan.   28 y.o. female with the following issues.    1. Closed fracture of right elbow, initial encounter  Manage expectantly.    2. Pain in both feet  Commending topical anti-inflammatories referred to podiatry  - REFERRAL TO PODIATRY    3. Breast wound, unspecified laterality, subsequent encounter  Have referred to wound clinic.  She will follow-up if spikes any fevers or has any drainage  - REFERRAL TO WOUND " CLINIC

## 2019-08-11 ENCOUNTER — APPOINTMENT (OUTPATIENT)
Dept: URGENT CARE | Facility: CLINIC | Age: 29
End: 2019-08-11
Payer: MEDICARE

## 2019-08-11 ENCOUNTER — APPOINTMENT (OUTPATIENT)
Dept: RADIOLOGY | Facility: IMAGING CENTER | Age: 29
End: 2019-08-11
Attending: PHYSICIAN ASSISTANT
Payer: MEDICARE

## 2019-08-11 ENCOUNTER — OFFICE VISIT (OUTPATIENT)
Dept: URGENT CARE | Facility: CLINIC | Age: 29
End: 2019-08-11
Payer: MEDICARE

## 2019-08-11 VITALS
HEIGHT: 65 IN | OXYGEN SATURATION: 95 % | HEART RATE: 94 BPM | TEMPERATURE: 97.9 F | BODY MASS INDEX: 47.48 KG/M2 | RESPIRATION RATE: 12 BRPM | WEIGHT: 285 LBS | DIASTOLIC BLOOD PRESSURE: 78 MMHG | SYSTOLIC BLOOD PRESSURE: 116 MMHG

## 2019-08-11 DIAGNOSIS — S50.01XA CONTUSION OF RIGHT ELBOW, INITIAL ENCOUNTER: ICD-10-CM

## 2019-08-11 PROCEDURE — 73080 X-RAY EXAM OF ELBOW: CPT | Mod: TC,RT | Performed by: PHYSICIAN ASSISTANT

## 2019-08-11 PROCEDURE — 99214 OFFICE O/P EST MOD 30 MIN: CPT | Performed by: PHYSICIAN ASSISTANT

## 2019-08-12 ASSESSMENT — ENCOUNTER SYMPTOMS
TINGLING: 1
JOINT SWELLING: 1
FOCAL WEAKNESS: 0
ARTHRALGIAS: 1
SENSORY CHANGE: 0

## 2019-08-12 NOTE — PROGRESS NOTES
"Subjective:      Kristin Balderrama is a 28 y.o. female who presents with Elbow Injury (DOI 8/7 got ortho glass which was not helping they removed it and hit elbow against door on 8/9 tried heat and ice not helping and is have pain/ tingling  )    Pt PMH, SocHx, SurgHx, FamHx, Drug allergies and medications reviewed with pt/EPIC.      Family history reviewed, it is not pertinent to this complaint.           Patient presents with:  Elbow Injury: DOI 8/7 got ortho glass which was not helping they removed it and then hit elbow against door on 8/9 tried heat and ice not helping and is have pain/ tingling.  Pt would like a repeat xray as she thinks she may have really broken it this time.         Arm Injury   This is a new problem. The current episode started in the past 7 days. The problem occurs constantly. The problem has been gradually worsening. Associated symptoms include arthralgias and joint swelling. The symptoms are aggravated by bending, exertion and twisting (palpation). She has tried NSAIDs, position changes, rest and ice (rx medications) for the symptoms. The treatment provided no relief.       Review of Systems   Musculoskeletal: Positive for arthralgias, joint pain (elbow) and joint swelling.   Neurological: Positive for tingling. Negative for sensory change and focal weakness.   All other systems reviewed and are negative.         Objective:     /78   Pulse 94   Temp 36.6 °C (97.9 °F)   Resp 12   Ht 1.651 m (5' 5\")   Wt (!) 129.3 kg (285 lb)   LMP 08/01/2019   SpO2 95%   BMI 47.43 kg/m²      Physical Exam   Constitutional: She is oriented to person, place, and time. She appears well-developed and well-nourished. No distress.   HENT:   Head: Normocephalic and atraumatic.   Nose: Nose normal.   Eyes: Pupils are equal, round, and reactive to light. Conjunctivae and EOM are normal.   Neck: Normal range of motion. Neck supple.   Cardiovascular: Normal rate and intact distal pulses. "   Pulmonary/Chest: Effort normal.   Musculoskeletal:        Right elbow: She exhibits decreased range of motion. She exhibits no swelling, no effusion, no deformity and no laceration. Tenderness found. Olecranon process tenderness noted.        Arms:  Pain with flexion and extension supination and pronation.  Difficult to appreciate any swelling due to patient's body habitus.  No crepitus noted.  Distal neurovascular is intact.  Patient does have a healing abrasion to olecranon process.   Neurological: She is alert and oriented to person, place, and time.   Skin: Skin is warm and dry. Capillary refill takes less than 2 seconds.   Psychiatric: She has a normal mood and affect.   Nursing note and vitals reviewed.         X-ray:   Impression       No acute osseous abnormality.             Last Resulted: 08/11/19  6:26 PM             Assessment/Plan:     1. Contusion of right elbow, initial encounter  DX-ELBOW-COMPLETE 3+ RIGHT     ACE wrap used for compression, pt got immediate relief from ACE.     RICE TREATMENT FOR EXTREMITY INJURIES:  R-rest the extremity as much as possible while pain and swelling persist  I-ice the extremity 15 minutes every 2 hours for the first 24 hours, then 4-5 times daily   C-compress the extremity either with splint or ace wrap as directed  E-elevate the extremity to help with swelling    PT to continue taking prescription medications as prescribed.        PT should follow up with PCP in 1-2 days for re-evaluation if symptoms have not improved.  Discussed red flags and reasons to return to UC or ED.  Pt and/or family verbalized understanding of diagnosis and follow up instructions and was offered informational handout on diagnosis.  PT discharged.

## 2019-08-13 ENCOUNTER — TELEPHONE (OUTPATIENT)
Dept: PULMONOLOGY | Facility: HOSPICE | Age: 29
End: 2019-08-13

## 2019-08-13 ENCOUNTER — OFFICE VISIT (OUTPATIENT)
Dept: WOUND CARE | Facility: MEDICAL CENTER | Age: 29
DRG: 202 | End: 2019-08-13
Attending: SURGERY
Payer: MEDICARE

## 2019-08-13 ENCOUNTER — APPOINTMENT (OUTPATIENT)
Dept: PULMONOLOGY | Facility: HOSPICE | Age: 29
End: 2019-08-13
Payer: MEDICARE

## 2019-08-13 VITALS
OXYGEN SATURATION: 90 % | TEMPERATURE: 98 F | DIASTOLIC BLOOD PRESSURE: 90 MMHG | SYSTOLIC BLOOD PRESSURE: 144 MMHG | HEART RATE: 94 BPM | RESPIRATION RATE: 22 BRPM

## 2019-08-13 DIAGNOSIS — E11.9 CONTROLLED TYPE 2 DIABETES MELLITUS WITHOUT COMPLICATION, WITHOUT LONG-TERM CURRENT USE OF INSULIN (HCC): ICD-10-CM

## 2019-08-13 DIAGNOSIS — F20.9 SCHIZOPHRENIA, UNSPECIFIED TYPE (HCC): ICD-10-CM

## 2019-08-13 DIAGNOSIS — E66.01 MORBIDLY OBESE (HCC): ICD-10-CM

## 2019-08-13 DIAGNOSIS — D84.9 IMMUNOSUPPRESSED STATUS (HCC): ICD-10-CM

## 2019-08-13 DIAGNOSIS — S21.002D WOUND OF LEFT BREAST, SUBSEQUENT ENCOUNTER: ICD-10-CM

## 2019-08-13 PROCEDURE — 99212 OFFICE O/P EST SF 10 MIN: CPT

## 2019-08-13 PROCEDURE — 99213 OFFICE O/P EST LOW 20 MIN: CPT | Performed by: NURSE PRACTITIONER

## 2019-08-13 ASSESSMENT — ENCOUNTER SYMPTOMS
DEPRESSION: 0
NAUSEA: 1
VOMITING: 0
ABDOMINAL PAIN: 0
NERVOUS/ANXIOUS: 0
MYALGIAS: 1
HEADACHES: 0
SHORTNESS OF BREATH: 0
DIZZINESS: 0
DIARRHEA: 0
CHILLS: 0
COUGH: 0
FEVER: 0

## 2019-08-13 NOTE — PROGRESS NOTES
"Provider Encounter- Full Thickness wound    HISTORY OF PRESENT ILLNESS        START OF CARE IN CLINIC: 8/13/19    REFERRING PROVIDER: PCP, Dr. Torres Brody     WOUND- Full thickness wound, scabs   LOCATION: left breast, mid lower abdomen   WOUND HISTORY: was seen in the past for wounds to left breast- reporting them as being on an off for 10 years. She states they just \"open up.\" she denies itching or picking behaviors.    PREVIOUS TREATMENT: wound care. She reports being prescribed oral bactrim and bacitracin ointment approx. 1 week ago. She reports no real improvement with these.    PERTINENT PMH: Morbid obesity, controlled type II diabetes, psychological disorders. Recently diagnosed with optic neuritis. Being followed by Dr. Cabral- on CellCept and Prednisone. She does report being non compliant with medications, stating that she ran out of prednisone approx. 1 week ago. She states that she has a script but has been too busy to get to the pharmacy. Previous hx of shingles to right breast.      LAST  WOUND CULTURE:  DATE : 7/31/19. Abdomen. negative           DIABETES: yes    TOBACCO USE: denies. Stating that she does use marijuana.     8/13/19: Initial clinic/provider visit. Clinic visit with EDD Evangelista FNP-BC. Patient reports feeling well. Denies wound drainage, odor. She states that wounds to her left breast and abdomen have scabbed over. +surrounding erythema. Denies pain.  Pt not monitoring her BS. Wounds today are scabbed and essentially resolved. Patient touching wounds throughout appt.     RESULTS:   Most recent A1c:  6.1 DATE 4/9/2019    PAST MEDICAL HISTORY:   Past Medical History:   Diagnosis Date   • Abdominal pain    • Anginal syndrome     random chest pain especially with tachycardia   • Apnea, sleep    • Arrhythmia     \"sinus tachycardia\", cariologist, Dr. Kumar; ablation 2/2016   • Arthritis     osteo   • ASTHMA     when around smoke   • Atrial fibrillation (HCC)    • Back pain    • " "Borderline personality disorder (HCC)    • Breath shortness     with tachycardia   • Bronchitis    • Cardiac arrhythmia    • Chickenpox    • Chronic UTI 9/18/2010   • Cough    • Daytime sleepiness    • Depression    • Diabetes (HCC)    • Diarrhea    • Disorder of thyroid    • Fall    • Fatigue    • Frequent headaches    • Gasping for breath    • Gynecological disorder     PCOS   • Headache(784.0)    • Heart burn    • History of falling    • Hypertension    • Indigestion    • Migraine    • 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HCC)    • 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HCC)    • Nausea    • Obesity    • Pain 08-15-12    back, 7/10   • Painful joint    • PCOS (polycystic ovarian syndrome)    • Pneumonia 2012   • Psychosis (HCC)    • Renal disorder     \"kidney disease, stage 1\" nephrologist, Dr. 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   • Urinary bladder disorder     Suprapubic cath   • Urinary incontinence    • Weakness    • Wears glasses        PAST SURGICAL HISTORY:   Past Surgical History:   Procedure Laterality Date   • MUSCLE BIOPSY Right 1/26/2017    Procedure: MUSCLE BIOPSY - THIGH;  Surgeon: Isidro Vigil M.D.;  Location: SURGERY [facility];  Service:    • GASTROSCOPY WITH BALLOON DILATATION N/A 1/4/2017    Procedure: GASTROSCOPY WITH DILATATION;  Surgeon: Torres Vargas M.D.;  Location: SURGERY [facility];  Service:    • BOWEL STIMULATOR INSERTION  7/13/2016    Procedure: BOWEL STIMULATOR INSERTION FOR PERMANENT INTERSTIM SACRAL IMPLANT;  Surgeon: Joe Noyola M.D.;  Location: SURGERY [facility];  Service:    • RECOVERY  1/27/2016    Procedure: CATH LAB EP STUDY WITH SINUS NODE MODIFICATION ABHINAV;  Surgeon: [facility];  Location: SURGERY PRE-POST PROC UNIT [facility] [place];  Service:    • " OTHER CARDIAC SURGERY  1/2016    cardiac ablation   • NEURO DEST FACET L/S W/IG SNGL  4/21/2015    Performed by Reza Tabor at SURGERY [facility]   • LUMBAR FUSION ANTERIOR  8/21/2012    Performed by SUSIE SAWANT at SURGERY [facility]   • ALYSSA BY LAPAROSCOPY  8/29/2010    Performed by SHAYY JOHNS at SURGERY [facility]   • LAMINOTOMY     • OTHER ABDOMINAL SURGERY     • TONSILLECTOMY      tonsillectomy        MEDICATIONS:   Current Outpatient Medications   Medication   • etonogestrel (NEXPLANON) 68 MG Implant implant   • FLUoxetine (PROZAC) 20 MG Cap   • mupirocin (BACTROBAN) 2 % Ointment   • mycophenolate (CELLCEPT) 500 MG tablet   • levothyroxine (SYNTHROID) 75 MCG Tab   • gabapentin (NEURONTIN) 300 MG Cap   • Diclofenac Sodium 1 % Gel   • Dulaglutide (TRULICITY) 0.75 MG/0.5ML Solution Pen-injector   • predniSONE (DELTASONE) 10 MG Tab   • ranitidine (ZANTAC) 300 MG tablet   • ondansetron (ZOFRAN) 4 MG Tab tablet   • folic acid (FOLVITE) 1 MG Tab   • ivabradine (CORLANOR) 5 MG Tab tablet   • sodium bicarbonate 325 MG Tab   • albuterol 108 (90 Base) MCG/ACT Aero Soln inhalation aerosol   • Melatonin 5 MG Tab   • furosemide (LASIX) 80 MG Tab   • busPIRone (BUSPAR) 5 MG tablet   • phenazopyridine (PYRIDIUM) 200 MG Tab   • ziprasidone (GEODON) 80 MG Cap   • methocarbamol (ROBAXIN) 750 MG Tab   • traZODone (DESYREL) 100 MG Tab   • Diphenhydramine-APAP, sleep, (TYLENOL PM EXTRA STRENGTH PO)   • LYRICA 300 MG capsule   • aspirin EC (ECOTRIN) 81 MG Tablet Delayed Response     No current facility-administered medications for this visit.        ALLERGIES:    Allergies   Allergen Reactions   • Cefdinir Shortness of Breath and Itching     Tolerated 1/18/17  Tolerates ceftriaxone    • Depakote [Divalproex Sodium] Unspecified     Muscle spasms/muscle pain and weakness     • Doxycycline Anaphylaxis and Vomiting     RXN=unknown   • Abilify Unspecified     Headaches/muscle twitching     • Amitriptyline  "Unspecified     Headaches     • Amoxicillin Rash     Pt states \"I get a rash\".     • Ciprofloxacin Rash     Pt states \"I get a rash\".     • Clindamycin Nausea     Even with food     • Ees [Erythromycin] Vomiting and Nausea   • Flagyl [Metronidazole Hcl] Unspecified     \"eye problems\"     • Flomax [Tamsulosin Hydrochloride] Swelling   • Metformin Unspecified     Increased lactic acid      • Tape Rash     Tears skin off  coban with Tegaderm tape ok intermittently  RXN=ongoing   • Vancomycin Itching     Pt becomes flushed in face and chest.   RXN=7/10/16   • Wound Dressing Adhesive Hives     By pt report   • Cephalexin [Keflex] Rash     Pt states she gets a rash with this medication  Tolerates ceftriaxone   • Levofloxacin Unspecified     Leg muscle cramps   • Metronidazole Rash     .   • Valproic Acid Rash     .         SOCIAL HISTORY:   Social History     Socioeconomic History   • Marital status: Single     Spouse name: Not on file   • Number of children: Not on file   • Years of education: Not on file   • Highest education level: Not on file   Occupational History   • Not on file   Social Needs   • Financial resource strain: Not on file   • Food insecurity:     Worry: Not on file     Inability: Not on file   • Transportation needs:     Medical: Not on file     Non-medical: Not on file   Tobacco Use   • Smoking status: Never Smoker   • Smokeless tobacco: Never Used   Substance and Sexual Activity   • Alcohol use: No     Alcohol/week: 0.0 oz   • Drug use: Yes     Frequency: 7.0 times per week     Types: Marijuana, Oral     Comment: Medicinal edible's   • Sexual activity: Not Currently     Birth control/protection: Pill   Lifestyle   • Physical activity:     Days per week: Not on file     Minutes per session: Not on file   • Stress: Not on file   Relationships   • Social connections:     Talks on phone: Not on file     Gets together: Not on file     Attends Buddhism service: Not on file     Active member of club or " organization: Not on file     Attends meetings of clubs or organizations: Not on file     Relationship status: Not on file   • Intimate partner violence:     Fear of current or ex partner: Not on file     Emotionally abused: Not on file     Physically abused: Not on file     Forced sexual activity: Not on file   Other Topics Concern   • Not on file   Social History Narrative    ** Merged History Encounter **            FAMILY HISTORY:   Family History   Problem Relation Age of Onset   • Hypertension Mother    • Sleep Apnea Mother    • Heart Disease Mother    • Other Mother         hypothryod   • Hypertension Maternal Uncle    • Heart Disease Maternal Grandmother    • Hypertension Maternal Grandmother    • No Known Problems Sister    • Other Sister         Narcolepsy;fibromyalsia;bone;nerve   • Genitourinary () Problems Sister         endometriosis        REVIEW OF SYSTEMS:   Review of Systems   Constitutional: Negative for chills and fever.   Respiratory: Negative for cough and shortness of breath.    Cardiovascular: Negative for chest pain and leg swelling.   Gastrointestinal: Positive for nausea. Negative for abdominal pain, diarrhea and vomiting.        Pt reporting intermittent nausea due to her DM. She reports eating beans and tootsie roll pops so far today due to the nausea.    Genitourinary: Negative for dysuria.   Musculoskeletal: Positive for joint pain and myalgias.        Chronic pain syndrome. Diffuse chronic joint pain. Pt stating that she uses marijuana for pain.    Skin: Negative for rash.   Neurological: Negative for dizziness and headaches.   Psychiatric/Behavioral: Negative for depression and suicidal ideas. The patient is not nervous/anxious.         Pt reporting schizophrenia and borderline personality disorder. She is on multiple psychiatric medications. Per patient her psychiatrist retired and is not scheduled to establish with a new psychiatrist until October.        PHYSICAL EXAMINATION:    /90   Pulse 94   Temp 36.7 °C (98 °F) (Temporal)   Resp (!) 22   LMP 08/01/2019   SpO2 90%   Physical Exam   Constitutional: She is oriented to person, place, and time and well-developed, well-nourished, and in no distress. No distress.   obese   HENT:   Head: Normocephalic and atraumatic.   Eyes:   Eyes not assessed. Wearing dark sunglasses. Followed closely by Dr. Cabral d/t optic neuritis.    Neck: Normal range of motion. Neck supple.   Male pattern hair growth   Cardiovascular: Normal rate.   Pulmonary/Chest: Effort normal. No respiratory distress.   Musculoskeletal:   In wheelchair due to chronic pain   Neurological: She is alert and oriented to person, place, and time.   Skin: Skin is warm and dry.   Left breast/mid lower abdomen- scabs.  See wound assessment/photos   Psychiatric: Mood, memory, affect and judgment normal.       Wound Assessment     Wound 08/13/19 Breast Left breast (Active)   Wound Image   8/13/2019  1:40 PM   Site Assessment Other (Comment) 8/13/2019  1:40 PM   Lucretia-wound Assessment Intact 8/13/2019  1:40 PM   Drainage Amount None 8/13/2019  1:40 PM   Dressing Options Adhesive Foam 8/13/2019  1:40 PM   WOUND NURSE ONLY - Odor None 8/13/2019  1:40 PM   WOUND NURSE ONLY - Exposed Structures None 8/13/2019  1:40 PM   WOUND NURSE ONLY - Tissue Type and Percentage 100% scattered scabs 8/13/2019  1:40 PM       Wound 08/13/19 Abdomen Abdomen (Active)   Wound Image   8/13/2019  1:40 PM   Site Assessment Other (Comment) 8/13/2019  1:40 PM   Lucretia-wound Assessment Intact 8/13/2019  1:40 PM   Drainage Amount None 8/13/2019  1:40 PM   Dressing Options Adhesive Foam 8/13/2019  1:40 PM   WOUND NURSE ONLY - Odor None 8/13/2019  1:40 PM   WOUND NURSE ONLY - Exposed Structures None 8/13/2019  1:40 PM   WOUND NURSE ONLY - Tissue Type and Percentage 100% scabbed 8/13/2019  1:40 PM       PATIENT EDUCATION  -Advised to go to ER for any increased redness, swelling, drainage or odor, or if patient  develops fever, chills, nausea or vomiting.  -Importance of adequate nutrition for wound healing  -Increase protein intake (unless contraindicated by renal status)    ASSESSMENT AND PLAN:   1. Wound of left breast, subsequent encounter, wound to lower mid abdomen  Comments: Patient states wounds have been recurring off and on for 10 years.       8/13/19:  Etiology unclear.  Suspect component of picking/scratching. Patient has extensive list of allergies.  Suspect possible dermatitis.  Advised patient to use hypoallergenic soaps and detergents.  Also advised that she wear loose fitting clothing, consider the wearing cotton only garments. Discussed importance of not picking wounds. Keeping covered.   Wounds scabbed. Discharge from [facility]. If reoccurrence would encourage referral to dermatology  Wound care: Nonadhesive foam.     2. Morbidly obese (HCC)  Complicating factor  8/13/19: Advantages of weight loss to manage diabetes, wound healing, and for overall better health discussed      3. Controlled type 2 diabetes mellitus without complication, without long-term current use of insulin (HCC)  Complicating factor  8/13/19: Discussed importance of glucose control and compliance with DM treatment regimen and diet modifications in regards to infection and wound healing.     4. Immunosuppressed  Complicating factor  8/13/19: being followed by Dr. Cabral d/t optic neuritis. On CellCept and steroids. Non compliance with treatment. Encouraged close FU and compliance.     5. Schizophrenia  Complicating factor to above health related issues.   8/13/19: pt on multiple psychiatric medications. Pt stating that her psychiatrist retired and she is scheduled to establish with a new psychiatrist in October. Denies hallucinations/delusion. Denies suicidal/homicidal ideations.       FU with PCP for ongoing chronic medical problems. Patient advised to return to clinic if her wounds deteriorate or fail to heal.    15 min spent face to face  with patient, >50% of time spent counseling, coordinating care, reviewing records, discussing POC, educating patient      Please note that this dictation was created using voice recognition software. I have worked with technical experts from [facility], [facility] to optimize the interface.  I have made every reasonable attempt to correct obvious errors, but there may be errors of grammar and possibly content that I did not discover before finalizing the note.

## 2019-08-13 NOTE — TELEPHONE ENCOUNTER
Pt called and was c/o Rattling,Wheezing and SOB.  Pt said she has been using her inhalers and it hasn't been helping.   Scheduled pt an appt for today at 3:15 with Lisa Bertrand PA-C

## 2019-08-14 ENCOUNTER — APPOINTMENT (OUTPATIENT)
Dept: PULMONOLOGY | Facility: HOSPICE | Age: 29
End: 2019-08-14
Payer: MEDICARE

## 2019-08-14 ENCOUNTER — APPOINTMENT (OUTPATIENT)
Dept: RADIOLOGY | Facility: MEDICAL CENTER | Age: 29
DRG: 202 | End: 2019-08-14
Attending: EMERGENCY MEDICINE
Payer: MEDICARE

## 2019-08-14 ENCOUNTER — TELEPHONE (OUTPATIENT)
Dept: PULMONOLOGY | Facility: HOSPICE | Age: 29
End: 2019-08-14

## 2019-08-14 ENCOUNTER — HOSPITAL ENCOUNTER (INPATIENT)
Facility: MEDICAL CENTER | Age: 29
LOS: 6 days | DRG: 202 | End: 2019-08-21
Attending: EMERGENCY MEDICINE | Admitting: HOSPITALIST
Payer: MEDICARE

## 2019-08-14 ENCOUNTER — APPOINTMENT (OUTPATIENT)
Dept: RADIOLOGY | Facility: MEDICAL CENTER | Age: 29
DRG: 202 | End: 2019-08-14
Payer: MEDICARE

## 2019-08-14 DIAGNOSIS — Z99.81 CHRONIC RESPIRATORY FAILURE WITH HYPOXIA, ON HOME OXYGEN THERAPY (HCC): ICD-10-CM

## 2019-08-14 DIAGNOSIS — Z99.81 ON HOME OXYGEN THERAPY: Chronic | ICD-10-CM

## 2019-08-14 DIAGNOSIS — R09.02 HYPOXIA: ICD-10-CM

## 2019-08-14 DIAGNOSIS — J96.11 CHRONIC RESPIRATORY FAILURE WITH HYPOXIA, ON HOME OXYGEN THERAPY (HCC): ICD-10-CM

## 2019-08-14 DIAGNOSIS — J45.41 MODERATE PERSISTENT ASTHMA WITH ACUTE EXACERBATION: ICD-10-CM

## 2019-08-14 DIAGNOSIS — J45.901 ACUTE EXACERBATION OF CHRONIC OBSTRUCTIVE AIRWAYS DISEASE WITH ASTHMA (HCC): ICD-10-CM

## 2019-08-14 DIAGNOSIS — J40 BRONCHITIS: ICD-10-CM

## 2019-08-14 DIAGNOSIS — J44.1 ACUTE EXACERBATION OF CHRONIC OBSTRUCTIVE AIRWAYS DISEASE WITH ASTHMA (HCC): ICD-10-CM

## 2019-08-14 LAB
ALBUMIN SERPL BCP-MCNC: 4.6 G/DL (ref 3.2–4.9)
ALBUMIN/GLOB SERPL: 2 G/DL
ALP SERPL-CCNC: 33 U/L (ref 30–99)
ALT SERPL-CCNC: 43 U/L (ref 2–50)
ANION GAP SERPL CALC-SCNC: 13 MMOL/L (ref 0–11.9)
APTT PPP: 26.2 SEC (ref 24.7–36)
AST SERPL-CCNC: 18 U/L (ref 12–45)
BASOPHILS # BLD AUTO: 0.3 % (ref 0–1.8)
BASOPHILS # BLD: 0.02 K/UL (ref 0–0.12)
BILIRUB SERPL-MCNC: 0.6 MG/DL (ref 0.1–1.5)
BUN SERPL-MCNC: 12 MG/DL (ref 8–22)
CALCIUM SERPL-MCNC: 10 MG/DL (ref 8.5–10.5)
CHLORIDE SERPL-SCNC: 107 MMOL/L (ref 96–112)
CO2 SERPL-SCNC: 17 MMOL/L (ref 20–33)
CREAT SERPL-MCNC: 0.67 MG/DL (ref 0.5–1.4)
D DIMER PPP IA.FEU-MCNC: <0.4 UG/ML (FEU) (ref 0–0.5)
EKG IMPRESSION: NORMAL
EOSINOPHIL # BLD AUTO: 0.02 K/UL (ref 0–0.51)
EOSINOPHIL NFR BLD: 0.3 % (ref 0–6.9)
ERYTHROCYTE [DISTWIDTH] IN BLOOD BY AUTOMATED COUNT: 48.7 FL (ref 35.9–50)
GLOBULIN SER CALC-MCNC: 2.3 G/DL (ref 1.9–3.5)
GLUCOSE BLD-MCNC: 126 MG/DL (ref 65–99)
GLUCOSE BLD-MCNC: 189 MG/DL (ref 65–99)
GLUCOSE SERPL-MCNC: 199 MG/DL (ref 65–99)
HCT VFR BLD AUTO: 40.2 % (ref 37–47)
HGB BLD-MCNC: 13.6 G/DL (ref 12–16)
IMM GRANULOCYTES # BLD AUTO: 0.04 K/UL (ref 0–0.11)
IMM GRANULOCYTES NFR BLD AUTO: 0.7 % (ref 0–0.9)
INR PPP: 0.94 (ref 0.87–1.13)
LYMPHOCYTES # BLD AUTO: 1.09 K/UL (ref 1–4.8)
LYMPHOCYTES NFR BLD: 17.8 % (ref 22–41)
MCH RBC QN AUTO: 31.8 PG (ref 27–33)
MCHC RBC AUTO-ENTMCNC: 33.8 G/DL (ref 33.6–35)
MCV RBC AUTO: 93.9 FL (ref 81.4–97.8)
MONOCYTES # BLD AUTO: 0.22 K/UL (ref 0–0.85)
MONOCYTES NFR BLD AUTO: 3.6 % (ref 0–13.4)
NEUTROPHILS # BLD AUTO: 4.74 K/UL (ref 2–7.15)
NEUTROPHILS NFR BLD: 77.3 % (ref 44–72)
NRBC # BLD AUTO: 0.02 K/UL
NRBC BLD-RTO: 0.3 /100 WBC
NT-PROBNP SERPL IA-MCNC: 113 PG/ML (ref 0–125)
PLATELET # BLD AUTO: 197 K/UL (ref 164–446)
PMV BLD AUTO: 11.1 FL (ref 9–12.9)
POTASSIUM SERPL-SCNC: 4.1 MMOL/L (ref 3.6–5.5)
PROCALCITONIN SERPL-MCNC: <0.05 NG/ML
PROT SERPL-MCNC: 6.9 G/DL (ref 6–8.2)
PROTHROMBIN TIME: 12.7 SEC (ref 12–14.6)
RBC # BLD AUTO: 4.28 M/UL (ref 4.2–5.4)
SODIUM SERPL-SCNC: 137 MMOL/L (ref 135–145)
TROPONIN T SERPL-MCNC: <6 NG/L (ref 6–19)
TSH SERPL DL<=0.005 MIU/L-ACNC: 1.08 UIU/ML (ref 0.38–5.33)
WBC # BLD AUTO: 6.1 K/UL (ref 4.8–10.8)

## 2019-08-14 PROCEDURE — 96365 THER/PROPH/DIAG IV INF INIT: CPT

## 2019-08-14 PROCEDURE — 700102 HCHG RX REV CODE 250 W/ 637 OVERRIDE(OP): Performed by: HOSPITALIST

## 2019-08-14 PROCEDURE — 83880 ASSAY OF NATRIURETIC PEPTIDE: CPT

## 2019-08-14 PROCEDURE — 700111 HCHG RX REV CODE 636 W/ 250 OVERRIDE (IP): Performed by: HOSPITALIST

## 2019-08-14 PROCEDURE — 82962 GLUCOSE BLOOD TEST: CPT

## 2019-08-14 PROCEDURE — G0378 HOSPITAL OBSERVATION PER HR: HCPCS

## 2019-08-14 PROCEDURE — 94760 N-INVAS EAR/PLS OXIMETRY 1: CPT

## 2019-08-14 PROCEDURE — 71045 X-RAY EXAM CHEST 1 VIEW: CPT

## 2019-08-14 PROCEDURE — 93005 ELECTROCARDIOGRAM TRACING: CPT

## 2019-08-14 PROCEDURE — 96366 THER/PROPH/DIAG IV INF ADDON: CPT

## 2019-08-14 PROCEDURE — 84443 ASSAY THYROID STIM HORMONE: CPT

## 2019-08-14 PROCEDURE — 85025 COMPLETE CBC W/AUTO DIFF WBC: CPT

## 2019-08-14 PROCEDURE — 84484 ASSAY OF TROPONIN QUANT: CPT

## 2019-08-14 PROCEDURE — A9270 NON-COVERED ITEM OR SERVICE: HCPCS | Performed by: HOSPITALIST

## 2019-08-14 PROCEDURE — 85379 FIBRIN DEGRADATION QUANT: CPT

## 2019-08-14 PROCEDURE — 99220 PR INITIAL OBSERVATION CARE,LEVL III: CPT | Performed by: HOSPITALIST

## 2019-08-14 PROCEDURE — 94640 AIRWAY INHALATION TREATMENT: CPT

## 2019-08-14 PROCEDURE — 93005 ELECTROCARDIOGRAM TRACING: CPT | Performed by: EMERGENCY MEDICINE

## 2019-08-14 PROCEDURE — 80053 COMPREHEN METABOLIC PANEL: CPT

## 2019-08-14 PROCEDURE — 99285 EMERGENCY DEPT VISIT HI MDM: CPT

## 2019-08-14 PROCEDURE — 84145 PROCALCITONIN (PCT): CPT

## 2019-08-14 PROCEDURE — 700101 HCHG RX REV CODE 250: Performed by: HOSPITALIST

## 2019-08-14 PROCEDURE — 85610 PROTHROMBIN TIME: CPT

## 2019-08-14 PROCEDURE — 700101 HCHG RX REV CODE 250: Performed by: EMERGENCY MEDICINE

## 2019-08-14 PROCEDURE — 85730 THROMBOPLASTIN TIME PARTIAL: CPT

## 2019-08-14 PROCEDURE — 700111 HCHG RX REV CODE 636 W/ 250 OVERRIDE (IP): Performed by: EMERGENCY MEDICINE

## 2019-08-14 RX ORDER — FUROSEMIDE 40 MG/1
80 TABLET ORAL DAILY
Status: DISCONTINUED | OUTPATIENT
Start: 2019-08-15 | End: 2019-08-21 | Stop reason: HOSPADM

## 2019-08-14 RX ORDER — PREDNISONE 5 MG/1
15 TABLET ORAL DAILY
Status: ON HOLD | COMMUNITY
End: 2019-09-02

## 2019-08-14 RX ORDER — GABAPENTIN 300 MG/1
300 CAPSULE ORAL 4 TIMES DAILY
Status: DISCONTINUED | OUTPATIENT
Start: 2019-08-14 | End: 2019-08-21 | Stop reason: HOSPADM

## 2019-08-14 RX ORDER — ONDANSETRON 4 MG/1
4 TABLET, ORALLY DISINTEGRATING ORAL EVERY 4 HOURS PRN
Status: DISCONTINUED | OUTPATIENT
Start: 2019-08-14 | End: 2019-08-16

## 2019-08-14 RX ORDER — METHYLPREDNISOLONE SODIUM SUCCINATE 125 MG/2ML
125 INJECTION, POWDER, LYOPHILIZED, FOR SOLUTION INTRAMUSCULAR; INTRAVENOUS ONCE
Status: DISCONTINUED | OUTPATIENT
Start: 2019-08-14 | End: 2019-08-14

## 2019-08-14 RX ORDER — SULFAMETHOXAZOLE AND TRIMETHOPRIM 800; 160 MG/1; MG/1
1 TABLET ORAL EVERY 12 HOURS
Status: DISCONTINUED | OUTPATIENT
Start: 2019-08-14 | End: 2019-08-15

## 2019-08-14 RX ORDER — METHOCARBAMOL 750 MG/1
750 TABLET, FILM COATED ORAL 3 TIMES DAILY
Status: DISCONTINUED | OUTPATIENT
Start: 2019-08-14 | End: 2019-08-21 | Stop reason: HOSPADM

## 2019-08-14 RX ORDER — PREDNISONE 20 MG/1
60 TABLET ORAL DAILY
Status: DISCONTINUED | OUTPATIENT
Start: 2019-08-15 | End: 2019-08-14

## 2019-08-14 RX ORDER — FAMOTIDINE 20 MG/1
40 TABLET, FILM COATED ORAL DAILY
Status: DISCONTINUED | OUTPATIENT
Start: 2019-08-15 | End: 2019-08-21 | Stop reason: HOSPADM

## 2019-08-14 RX ORDER — FLUOXETINE 10 MG/1
20 CAPSULE ORAL DAILY
Status: DISCONTINUED | OUTPATIENT
Start: 2019-08-15 | End: 2019-08-21 | Stop reason: HOSPADM

## 2019-08-14 RX ORDER — MAGNESIUM SULFATE HEPTAHYDRATE 40 MG/ML
2 INJECTION, SOLUTION INTRAVENOUS ONCE
Status: COMPLETED | OUTPATIENT
Start: 2019-08-14 | End: 2019-08-15

## 2019-08-14 RX ORDER — PROMETHAZINE HYDROCHLORIDE 12.5 MG/1
12.5-25 SUPPOSITORY RECTAL EVERY 4 HOURS PRN
Status: DISCONTINUED | OUTPATIENT
Start: 2019-08-14 | End: 2019-08-21 | Stop reason: HOSPADM

## 2019-08-14 RX ORDER — PROMETHAZINE HYDROCHLORIDE 25 MG/1
12.5-25 TABLET ORAL EVERY 4 HOURS PRN
Status: DISCONTINUED | OUTPATIENT
Start: 2019-08-14 | End: 2019-08-21 | Stop reason: HOSPADM

## 2019-08-14 RX ORDER — BUSPIRONE HYDROCHLORIDE 10 MG/1
5 TABLET ORAL 2 TIMES DAILY
Status: DISCONTINUED | OUTPATIENT
Start: 2019-08-14 | End: 2019-08-21 | Stop reason: HOSPADM

## 2019-08-14 RX ORDER — SODIUM BICARBONATE 325 MG/1
325-650 TABLET ORAL 3 TIMES DAILY
Status: DISCONTINUED | OUTPATIENT
Start: 2019-08-14 | End: 2019-08-21 | Stop reason: HOSPADM

## 2019-08-14 RX ORDER — METHYLPREDNISOLONE SODIUM SUCCINATE 40 MG/ML
40 INJECTION, POWDER, LYOPHILIZED, FOR SOLUTION INTRAMUSCULAR; INTRAVENOUS EVERY 6 HOURS
Status: DISCONTINUED | OUTPATIENT
Start: 2019-08-15 | End: 2019-08-16

## 2019-08-14 RX ORDER — POLYETHYLENE GLYCOL 3350 17 G/17G
1 POWDER, FOR SOLUTION ORAL
Status: DISCONTINUED | OUTPATIENT
Start: 2019-08-14 | End: 2019-08-21 | Stop reason: HOSPADM

## 2019-08-14 RX ORDER — ZIPRASIDONE HYDROCHLORIDE 80 MG/1
80 CAPSULE ORAL 2 TIMES DAILY
Status: DISCONTINUED | OUTPATIENT
Start: 2019-08-14 | End: 2019-08-21 | Stop reason: HOSPADM

## 2019-08-14 RX ORDER — RANITIDINE 300 MG/1
300 TABLET ORAL DAILY
Status: DISCONTINUED | OUTPATIENT
Start: 2019-08-15 | End: 2019-08-14

## 2019-08-14 RX ORDER — IPRATROPIUM BROMIDE AND ALBUTEROL SULFATE 2.5; .5 MG/3ML; MG/3ML
3 SOLUTION RESPIRATORY (INHALATION)
Status: DISCONTINUED | OUTPATIENT
Start: 2019-08-14 | End: 2019-08-15

## 2019-08-14 RX ORDER — AMOXICILLIN 250 MG
2 CAPSULE ORAL 2 TIMES DAILY
Status: DISCONTINUED | OUTPATIENT
Start: 2019-08-14 | End: 2019-08-21 | Stop reason: HOSPADM

## 2019-08-14 RX ORDER — PREGABALIN 150 MG/1
300 CAPSULE ORAL 2 TIMES DAILY
Status: DISCONTINUED | OUTPATIENT
Start: 2019-08-14 | End: 2019-08-21 | Stop reason: HOSPADM

## 2019-08-14 RX ORDER — PREDNISONE 20 MG/1
60 TABLET ORAL ONCE
Status: COMPLETED | OUTPATIENT
Start: 2019-08-14 | End: 2019-08-14

## 2019-08-14 RX ORDER — LEVOTHYROXINE SODIUM 0.07 MG/1
75 TABLET ORAL
Status: DISCONTINUED | OUTPATIENT
Start: 2019-08-15 | End: 2019-08-21 | Stop reason: HOSPADM

## 2019-08-14 RX ORDER — BISACODYL 10 MG
10 SUPPOSITORY, RECTAL RECTAL
Status: DISCONTINUED | OUTPATIENT
Start: 2019-08-14 | End: 2019-08-21 | Stop reason: HOSPADM

## 2019-08-14 RX ORDER — TRAZODONE HYDROCHLORIDE 50 MG/1
100 TABLET ORAL
Status: DISCONTINUED | OUTPATIENT
Start: 2019-08-14 | End: 2019-08-21 | Stop reason: HOSPADM

## 2019-08-14 RX ORDER — ONDANSETRON 2 MG/ML
4 INJECTION INTRAMUSCULAR; INTRAVENOUS EVERY 4 HOURS PRN
Status: DISCONTINUED | OUTPATIENT
Start: 2019-08-14 | End: 2019-08-21 | Stop reason: HOSPADM

## 2019-08-14 RX ORDER — MYCOPHENOLATE MOFETIL 250 MG/1
500 CAPSULE ORAL 2 TIMES DAILY
Status: DISCONTINUED | OUTPATIENT
Start: 2019-08-14 | End: 2019-08-21 | Stop reason: HOSPADM

## 2019-08-14 RX ADMIN — SULFAMETHOXAZOLE AND TRIMETHOPRIM 1 TABLET: 800; 160 TABLET ORAL at 23:58

## 2019-08-14 RX ADMIN — ZIPRASIDONE HYDROCHLORIDE 80 MG: 80 CAPSULE ORAL at 23:59

## 2019-08-14 RX ADMIN — SENNOSIDES, DOCUSATE SODIUM 2 TABLET: 50; 8.6 TABLET, FILM COATED ORAL at 22:44

## 2019-08-14 RX ADMIN — BUSPIRONE HYDROCHLORIDE 5 MG: 5 TABLET ORAL at 23:59

## 2019-08-14 RX ADMIN — METHOCARBAMOL TABLETS 750 MG: 750 TABLET, COATED ORAL at 22:45

## 2019-08-14 RX ADMIN — TRAZODONE HYDROCHLORIDE 100 MG: 50 TABLET ORAL at 22:43

## 2019-08-14 RX ADMIN — IPRATROPIUM BROMIDE AND ALBUTEROL SULFATE 3 ML: .5; 3 SOLUTION RESPIRATORY (INHALATION) at 22:13

## 2019-08-14 RX ADMIN — PREDNISONE 60 MG: 20 TABLET ORAL at 18:11

## 2019-08-14 RX ADMIN — MAGNESIUM SULFATE 2 G: 2 INJECTION INTRAVENOUS at 22:51

## 2019-08-14 RX ADMIN — GABAPENTIN 300 MG: 300 CAPSULE ORAL at 22:45

## 2019-08-14 RX ADMIN — PREGABALIN 300 MG: 150 CAPSULE ORAL at 23:22

## 2019-08-14 RX ADMIN — ALBUTEROL SULFATE 2.5 MG: 2.5 SOLUTION RESPIRATORY (INHALATION) at 17:03

## 2019-08-14 ASSESSMENT — ENCOUNTER SYMPTOMS
EYE DISCHARGE: 0
HALLUCINATIONS: 0
BLURRED VISION: 0
HEARTBURN: 0
MYALGIAS: 0
WHEEZING: 1
DEPRESSION: 0
DIZZINESS: 0
SHORTNESS OF BREATH: 1
SPEECH CHANGE: 0
CHILLS: 0
SPUTUM PRODUCTION: 1
FLANK PAIN: 0
FOCAL WEAKNESS: 1
FEVER: 0
PALPITATIONS: 0
NAUSEA: 0
BRUISES/BLEEDS EASILY: 0
COUGH: 1
DOUBLE VISION: 0
ABDOMINAL PAIN: 0
HEMOPTYSIS: 0
WEAKNESS: 1
VOMITING: 0
SENSORY CHANGE: 1

## 2019-08-14 ASSESSMENT — LIFESTYLE VARIABLES
DO YOU DRINK ALCOHOL: NO
EVER HAD A DRINK FIRST THING IN THE MORNING TO STEADY YOUR NERVES TO GET RID OF A HANGOVER: NO
CONSUMPTION TOTAL: NEGATIVE
EVER_SMOKED: NEVER
EVER FELT BAD OR GUILTY ABOUT YOUR DRINKING: NO
HAVE YOU EVER FELT YOU SHOULD CUT DOWN ON YOUR DRINKING: NO
HOW MANY TIMES IN THE PAST YEAR HAVE YOU HAD 5 OR MORE DRINKS IN A DAY: 0
TOTAL SCORE: 0
TOTAL SCORE: 0
DOES PATIENT WANT TO STOP DRINKING: NO
SUBSTANCE_ABUSE: 0
AVERAGE NUMBER OF DAYS PER WEEK YOU HAVE A DRINK CONTAINING ALCOHOL: 0
ON A TYPICAL DAY WHEN YOU DRINK ALCOHOL HOW MANY DRINKS DO YOU HAVE: 0
TOTAL SCORE: 0
HAVE PEOPLE ANNOYED YOU BY CRITICIZING YOUR DRINKING: NO

## 2019-08-14 ASSESSMENT — PATIENT HEALTH QUESTIONNAIRE - PHQ9
2. FEELING DOWN, DEPRESSED, IRRITABLE, OR HOPELESS: NOT AT ALL
SUM OF ALL RESPONSES TO PHQ9 QUESTIONS 1 AND 2: 0
1. LITTLE INTEREST OR PLEASURE IN DOING THINGS: NOT AT ALL

## 2019-08-14 ASSESSMENT — COPD QUESTIONNAIRES
DO YOU EVER COUGH UP ANY MUCUS OR PHLEGM?: YES, A FEW DAYS A WEEK OR MONTH
DURING THE PAST 4 WEEKS HOW MUCH DID YOU FEEL SHORT OF BREATH: SOME OF THE TIME
COPD SCREENING SCORE: 3
HAVE YOU SMOKED AT LEAST 100 CIGARETTES IN YOUR ENTIRE LIFE: NO/DON'T KNOW

## 2019-08-14 NOTE — TELEPHONE ENCOUNTER
Lisa Bertrand P.A.-C.  Lissy Garza, Med Ass't   Caller: Unspecified (Today, 12:10 PM)             Thank you for the update

## 2019-08-14 NOTE — ED PROVIDER NOTES
"ED Provider Note    CHIEF COMPLAINT  Chief Complaint   Patient presents with   • Shortness of Breath     increasing over last 2 wks    • Chest Wall Pain   • Cough       HPI  Kristin Balderrama is a 28 y.o. female who presents for evaluation of shortness of breath.  Patient states over the last 2 weeks she is had progressive shortness of breath.  Patient has had a cough with green sputum.  Patient also describes associated wheezing.  Patient has complex past medical history including atrial fibrillation, pleural effusions, mild pericardial effusion.  She was recently worked up for pulmonary embolism which was negative.  Patient unsure whether she is had a fever.  She denies any associated: Hemoptysis, abdominal pain, vomiting, diarrhea, hematemesis, melena hematochezia, hematuria, dysuria.  No other acute symptomatology or complaints.    REVIEW OF SYSTEMS  See HPI for further details. All other systems negative.    PAST MEDICAL HISTORY  Past Medical History:   Diagnosis Date   • Abdominal pain    • Anginal syndrome     random chest pain especially with tachycardia   • Apnea, sleep    • Arrhythmia     \"sinus tachycardia\", cariologist, Dr. Kumar; ablation 2/2016   • Arthritis     osteo   • ASTHMA     when around smoke   • Atrial fibrillation (HCC)    • Back pain    • Borderline personality disorder (HCC)    • Breath shortness     with tachycardia   • Bronchitis    • Cardiac arrhythmia    • Chickenpox    • Chronic UTI 9/18/2010   • Cough    • Daytime sleepiness    • Depression    • Diabetes (HCC)    • Diarrhea    • Disorder of thyroid    • Fall    • Fatigue    • Frequent headaches    • Gasping for breath    • Gynecological disorder     PCOS   • Headache(784.0)    • Heart burn    • History of falling    • Hypertension    • Indigestion    • Migraine    • 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HCC)    • 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HCC)    • Nausea    • Obesity    • Pain 08-15-12    back, 7/10   • Painful joint    • PCOS (polycystic " "ovarian syndrome)    • Pneumonia 2012   • Psychosis (HCC)    • Renal disorder     \"kidney disease, stage 1\" nephrologist, Dr. 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   • Urinary bladder disorder     Suprapubic cath   • Urinary incontinence    • Weakness    • Wears glasses        FAMILY HISTORY  Family History   Problem Relation Age of Onset   • Hypertension Mother    • Sleep Apnea Mother    • Heart Disease Mother    • Other Mother         hypothryod   • Hypertension Maternal Uncle    • Heart Disease Maternal Grandmother    • Hypertension Maternal Grandmother    • No Known Problems Sister    • Other Sister         Narcolepsy;fibromyalsia;bone;nerve   • Genitourinary () Problems Sister         endometriosis       SOCIAL HISTORY  Non-smoker; denies alcohol use; positive marijuana use;    SURGICAL HISTORY  Past Surgical History:   Procedure Laterality Date   • MUSCLE BIOPSY Right 1/26/2017    Procedure: MUSCLE BIOPSY - THIGH;  Surgeon: Isidro Vigil M.D.;  Location: [facility];  Service:    • GASTROSCOPY WITH BALLOON DILATATION N/A 1/4/2017    Procedure: GASTROSCOPY WITH DILATATION;  Surgeon: Torres Vargas M.D.;  Location: [facility];  Service:    • BOWEL STIMULATOR INSERTION  7/13/2016    Procedure: BOWEL STIMULATOR INSERTION FOR PERMANENT INTERSTIM SACRAL IMPLANT;  Surgeon: Joe Noyola M.D.;  Location: [facility];  Service:    • RECOVERY  1/27/2016    Procedure: CATH LAB EP STUDY WITH SINUS NODE MODIFICATION ABHINAV;  Surgeon: Recoveryonly Surgery;  Location: SURGERY PRE-POST PROC UNIT [facility] [place];  Service:    • OTHER CARDIAC SURGERY  1/2016    cardiac ablation   • NEURO DEST FACET L/S W/IG SNGL  4/21/2015    Performed by Reza Tabor at [facility] SURGICAL [facility] "   • LUMBAR FUSION ANTERIOR  8/21/2012    Performed by SUSIE SAWANT at SURGERY [facility] ORS   • ALYSSA BY LAPAROSCOPY  8/29/2010    Performed by SHAYY JOHNS at SURGERY [facility] ORS   • LAMINOTOMY     • OTHER ABDOMINAL SURGERY     • TONSILLECTOMY      tonsillectomy       CURRENT MEDICATIONS  Home Medications     Reviewed by Kaitlin Lott R.N. (Registered Nurse) on 08/14/19 at 1256  Med List Status: Partial   Medication Last Dose Status   albuterol 108 (90 Base) MCG/ACT Aero Soln inhalation aerosol 8/14/2019 Active   aspirin EC (ECOTRIN) 81 MG Tablet Delayed Response 8/14/2019 Active   busPIRone (BUSPAR) 5 MG tablet 8/14/2019 Active   Diclofenac Sodium 1 % Gel 8/14/2019 Active   Diphenhydramine-APAP, sleep, (TYLENOL PM EXTRA STRENGTH PO) 8/14/2019 Active   Dulaglutide (TRULICITY) 0.75 MG/0.5ML Solution Pen-injector 8/14/2019 Active   etonogestrel (NEXPLANON) 68 MG Implant implant 8/14/2019 Active   FLUoxetine (PROZAC) 20 MG Cap 8/14/2019 Active   folic acid (FOLVITE) 1 MG Tab 8/14/2019 Active   furosemide (LASIX) 80 MG Tab 8/14/2019 Active   gabapentin (NEURONTIN) 300 MG Cap 8/14/2019 Active   ivabradine (CORLANOR) 5 MG Tab tablet 8/14/2019 Active   levothyroxine (SYNTHROID) 75 MCG Tab 8/14/2019 Active   LYRICA 300 MG capsule 8/14/2019 Active   Melatonin 5 MG Tab 8/13/2019 Active   methocarbamol (ROBAXIN) 750 MG Tab 8/14/2019 Active   mupirocin (BACTROBAN) 2 % Ointment 8/14/2019 Active   mycophenolate (CELLCEPT) 500 MG tablet 8/14/2019 Active   ondansetron (ZOFRAN) 4 MG Tab tablet 8/14/2019 Active   phenazopyridine (PYRIDIUM) 200 MG Tab 8/14/2019 Active   predniSONE (DELTASONE) 10 MG Tab 8/14/2019 Active   ranitidine (ZANTAC) 300 MG tablet 8/14/2019 Active   sodium bicarbonate 325 MG Tab 8/14/2019 Active   traZODone (DESYREL) 100 MG Tab 8/13/2019 Active   ziprasidone (GEODON) 80 MG Cap 8/14/2019 Active                ALLERGIES  Allergies   Allergen Reactions   • Cefdinir Shortness of Breath and Itching  "    Tolerated 1/18/17  Tolerates ceftriaxone    • Depakote [Divalproex Sodium] Unspecified     Muscle spasms/muscle pain and weakness     • Doxycycline Anaphylaxis and Vomiting     RXN=unknown   • Abilify Unspecified     Headaches/muscle twitching     • Amitriptyline Unspecified     Headaches     • Amoxicillin Rash     Pt states \"I get a rash\".     • Ciprofloxacin Rash     Pt states \"I get a rash\".     • Clindamycin Nausea     Even with food     • Ees [Erythromycin] Vomiting and Nausea   • Flagyl [Metronidazole Hcl] Unspecified     \"eye problems\"     • Flomax [Tamsulosin Hydrochloride] Swelling   • Metformin Unspecified     Increased lactic acid      • Tape Rash     Tears skin off  coban with Tegaderm tape ok intermittently  RXN=ongoing   • Vancomycin Itching     Pt becomes flushed in face and chest.   RXN=7/10/16   • Wound Dressing Adhesive Hives     By pt report   • Cephalexin [Keflex] Rash     Pt states she gets a rash with this medication  Tolerates ceftriaxone   • Levofloxacin Unspecified     Leg muscle cramps   • Metronidazole Rash     .   • Valproic Acid Rash     .       PHYSICAL EXAM  VITAL SIGNS: /92   Pulse 96   Temp 36.5 °C (97.7 °F) (Temporal)   Resp 18   Wt (!) 133.8 kg (294 lb 15.6 oz)   LMP 08/01/2019   SpO2 93%   BMI 49.09 kg/m²    Constitutional: 28-year-old female, obese, chronically ill-appearing, awake, oriented x3  HENT: ,Atraumatic, Bilateral external ears normal, tympanic membranes clear, Oropharynx mildly dry, No oral exudates, Nose normal.   Eyes: PERRL, EOMI, Conjunctiva normal, No discharge.   Neck: Normal range of motion, No tenderness, Supple, No stridor.   Lymphatic: No lymphadenopathy noted.   Cardiovascular: Normal heart rate, Normal rhythm, No murmurs, No rubs, No gallops.   Thorax & Lungs: Decreased breath sounds bilaterally with mild expiratory wheezing, no stridor, no rales. No chest tenderness.   Abdomen: Soft, nontender, nondistended, no organomegaly, positive bowel " sounds normal in quality. No guarding or rebound.  Skin: Good skin turgor, pink, warm, dry. No rashes, petechiae, purpura. Normal capillary refill.   Back: No tenderness, No CVA tenderness.   Extremities: Intact distal pulses, very mild bilateral lower extremity edema, No tenderness, No cyanosis, No clubbing. Vascular: Pulses are 2+, symmetric in the upper and lower extremities.  Musculoskeletal: Patient has her left lower extremity in a brace;  Neurologic: Alert & oriented x 3, Normal motor function, Normal sensory function, No gross focal deficits noted.   Psychiatric: Affect normal, Judgment normal, Mood normal.     EKG  I have interpreted: Rate 90, rhythm sinus, axis normal, HI QRS QT intervals normal, no acute STEMI, twelve-lead EKG, no acute change compared to previous tracing;    RADIOLOGY/PROCEDURES  DX-CHEST-PORTABLE (1 VIEW)   Final Result      No acute cardiopulmonary abnormality.          COURSE & MEDICAL DECISION MAKING  Pertinent Labs & Imaging studies reviewed. (See chart for details)  1.  Saline lock  2.  O2  3.  Solu-Medrol  4.  Albuterol bySVN    Laboratory studies: CBC shows white count of 6.1, 77% neutrophils, 17% lymphocytes, 3% monocytes, hemoglobin 13.6, crit 40.2; CMP shows CO2 17, random glucose 199, otherwise within normal; troponin less than 6; INR 0.94; d-dimer less than 0.40;     Discussion/consultation: At this time, the patient presents here for evaluation of shortness of breath.  Patient has findings consistent with acute bronchospasm.  Treatment was initiated as noted above.  Reevaluation revealed improved breath sounds per persistent wheezing and associated hypoxia.  I spoke with the hospitalist on-call.  The patient will be admitted for further monitoring, treatment, and care.    FINAL IMPRESSION  1. Acute exacerbation of chronic obstructive airways disease with asthma (HCC)    2. Hypoxia    3. Bronchitis          PLAN  1.  Patient will be admitted for further monitoring,  treatment, and care.    Electronically signed by: Guy G Gansert, 8/14/2019 4:25 PM

## 2019-08-14 NOTE — TELEPHONE ENCOUNTER
The patient called and she was gasping for air.  She has a scheduled appt today with Lisa Bertrand, but I told her to go to the ER as she cannot catch her breath.  She stated that she will go to the ER.

## 2019-08-14 NOTE — ED TRIAGE NOTES
Pt to triage .  Chief Complaint   Patient presents with   • Shortness of Breath     increasing over last 2 wks    • Chest Wall Pain   • Cough

## 2019-08-15 ENCOUNTER — APPOINTMENT (OUTPATIENT)
Dept: PHYSICAL THERAPY | Facility: REHABILITATION | Age: 29
End: 2019-08-15
Attending: INTERNAL MEDICINE
Payer: MEDICARE

## 2019-08-15 ENCOUNTER — PATIENT OUTREACH (OUTPATIENT)
Dept: HEALTH INFORMATION MANAGEMENT | Facility: OTHER | Age: 29
End: 2019-08-15

## 2019-08-15 LAB
ALBUMIN SERPL BCP-MCNC: 4.4 G/DL (ref 3.2–4.9)
ALBUMIN/GLOB SERPL: 1.9 G/DL
ALP SERPL-CCNC: 30 U/L (ref 30–99)
ALT SERPL-CCNC: 41 U/L (ref 2–50)
ANION GAP SERPL CALC-SCNC: 13 MMOL/L (ref 0–11.9)
AST SERPL-CCNC: 16 U/L (ref 12–45)
BASOPHILS # BLD AUTO: 0.3 % (ref 0–1.8)
BASOPHILS # BLD: 0.02 K/UL (ref 0–0.12)
BILIRUB SERPL-MCNC: 0.5 MG/DL (ref 0.1–1.5)
BUN SERPL-MCNC: 9 MG/DL (ref 8–22)
CALCIUM SERPL-MCNC: 9.6 MG/DL (ref 8.5–10.5)
CHLORIDE SERPL-SCNC: 106 MMOL/L (ref 96–112)
CO2 SERPL-SCNC: 15 MMOL/L (ref 20–33)
CREAT SERPL-MCNC: 0.57 MG/DL (ref 0.5–1.4)
EKG IMPRESSION: NORMAL
EOSINOPHIL # BLD AUTO: 0 K/UL (ref 0–0.51)
EOSINOPHIL NFR BLD: 0 % (ref 0–6.9)
ERYTHROCYTE [DISTWIDTH] IN BLOOD BY AUTOMATED COUNT: 49 FL (ref 35.9–50)
GLOBULIN SER CALC-MCNC: 2.3 G/DL (ref 1.9–3.5)
GLUCOSE BLD-MCNC: 244 MG/DL (ref 65–99)
GLUCOSE BLD-MCNC: 288 MG/DL (ref 65–99)
GLUCOSE BLD-MCNC: 314 MG/DL (ref 65–99)
GLUCOSE SERPL-MCNC: 237 MG/DL (ref 65–99)
HCT VFR BLD AUTO: 39.1 % (ref 37–47)
HGB BLD-MCNC: 12.9 G/DL (ref 12–16)
IMM GRANULOCYTES # BLD AUTO: 0.06 K/UL (ref 0–0.11)
IMM GRANULOCYTES NFR BLD AUTO: 0.9 % (ref 0–0.9)
LYMPHOCYTES # BLD AUTO: 0.57 K/UL (ref 1–4.8)
LYMPHOCYTES NFR BLD: 8.3 % (ref 22–41)
MCH RBC QN AUTO: 31.2 PG (ref 27–33)
MCHC RBC AUTO-ENTMCNC: 33 G/DL (ref 33.6–35)
MCV RBC AUTO: 94.7 FL (ref 81.4–97.8)
MONOCYTES # BLD AUTO: 0.1 K/UL (ref 0–0.85)
MONOCYTES NFR BLD AUTO: 1.5 % (ref 0–13.4)
NEUTROPHILS # BLD AUTO: 6.09 K/UL (ref 2–7.15)
NEUTROPHILS NFR BLD: 89 % (ref 44–72)
NRBC # BLD AUTO: 0 K/UL
NRBC BLD-RTO: 0 /100 WBC
PLATELET # BLD AUTO: 165 K/UL (ref 164–446)
PMV BLD AUTO: 11.1 FL (ref 9–12.9)
POTASSIUM SERPL-SCNC: 4 MMOL/L (ref 3.6–5.5)
PROT SERPL-MCNC: 6.7 G/DL (ref 6–8.2)
RBC # BLD AUTO: 4.13 M/UL (ref 4.2–5.4)
SODIUM SERPL-SCNC: 134 MMOL/L (ref 135–145)
WBC # BLD AUTO: 6.8 K/UL (ref 4.8–10.8)

## 2019-08-15 PROCEDURE — 96375 TX/PRO/DX INJ NEW DRUG ADDON: CPT

## 2019-08-15 PROCEDURE — 700102 HCHG RX REV CODE 250 W/ 637 OVERRIDE(OP): Performed by: HOSPITALIST

## 2019-08-15 PROCEDURE — 94640 AIRWAY INHALATION TREATMENT: CPT

## 2019-08-15 PROCEDURE — 700102 HCHG RX REV CODE 250 W/ 637 OVERRIDE(OP): Performed by: INTERNAL MEDICINE

## 2019-08-15 PROCEDURE — 700101 HCHG RX REV CODE 250

## 2019-08-15 PROCEDURE — 97166 OT EVAL MOD COMPLEX 45 MIN: CPT

## 2019-08-15 PROCEDURE — A9270 NON-COVERED ITEM OR SERVICE: HCPCS | Performed by: INTERNAL MEDICINE

## 2019-08-15 PROCEDURE — 700102 HCHG RX REV CODE 250 W/ 637 OVERRIDE(OP): Performed by: NURSE PRACTITIONER

## 2019-08-15 PROCEDURE — 85025 COMPLETE CBC W/AUTO DIFF WBC: CPT

## 2019-08-15 PROCEDURE — 700101 HCHG RX REV CODE 250: Performed by: HOSPITALIST

## 2019-08-15 PROCEDURE — A9270 NON-COVERED ITEM OR SERVICE: HCPCS | Performed by: NURSE PRACTITIONER

## 2019-08-15 PROCEDURE — 93005 ELECTROCARDIOGRAM TRACING: CPT | Performed by: INTERNAL MEDICINE

## 2019-08-15 PROCEDURE — 700111 HCHG RX REV CODE 636 W/ 250 OVERRIDE (IP): Performed by: INTERNAL MEDICINE

## 2019-08-15 PROCEDURE — 99233 SBSQ HOSP IP/OBS HIGH 50: CPT | Performed by: INTERNAL MEDICINE

## 2019-08-15 PROCEDURE — 700111 HCHG RX REV CODE 636 W/ 250 OVERRIDE (IP): Performed by: HOSPITALIST

## 2019-08-15 PROCEDURE — 770020 HCHG ROOM/CARE - TELE (206)

## 2019-08-15 PROCEDURE — 96376 TX/PRO/DX INJ SAME DRUG ADON: CPT

## 2019-08-15 PROCEDURE — 96372 THER/PROPH/DIAG INJ SC/IM: CPT

## 2019-08-15 PROCEDURE — 82962 GLUCOSE BLOOD TEST: CPT | Mod: 91

## 2019-08-15 PROCEDURE — A9270 NON-COVERED ITEM OR SERVICE: HCPCS | Performed by: HOSPITALIST

## 2019-08-15 PROCEDURE — 80053 COMPREHEN METABOLIC PANEL: CPT

## 2019-08-15 PROCEDURE — 93010 ELECTROCARDIOGRAM REPORT: CPT | Performed by: INTERNAL MEDICINE

## 2019-08-15 PROCEDURE — A6212 FOAM DRG <=16 SQ IN W/BORDER: HCPCS

## 2019-08-15 PROCEDURE — 700101 HCHG RX REV CODE 250: Performed by: INTERNAL MEDICINE

## 2019-08-15 RX ORDER — IPRATROPIUM BROMIDE AND ALBUTEROL SULFATE 2.5; .5 MG/3ML; MG/3ML
3 SOLUTION RESPIRATORY (INHALATION)
Status: DISCONTINUED | OUTPATIENT
Start: 2019-08-16 | End: 2019-08-17

## 2019-08-15 RX ORDER — LEVALBUTEROL INHALATION SOLUTION 1.25 MG/3ML
SOLUTION RESPIRATORY (INHALATION)
Status: COMPLETED
Start: 2019-08-15 | End: 2019-08-15

## 2019-08-15 RX ORDER — IPRATROPIUM BROMIDE AND ALBUTEROL SULFATE 2.5; .5 MG/3ML; MG/3ML
3 SOLUTION RESPIRATORY (INHALATION)
Status: DISCONTINUED | OUTPATIENT
Start: 2019-08-15 | End: 2019-08-17

## 2019-08-15 RX ORDER — MORPHINE SULFATE 4 MG/ML
2 INJECTION, SOLUTION INTRAMUSCULAR; INTRAVENOUS ONCE
Status: COMPLETED | OUTPATIENT
Start: 2019-08-15 | End: 2019-08-15

## 2019-08-15 RX ORDER — ACETAMINOPHEN 325 MG/1
650 TABLET ORAL
Status: COMPLETED | OUTPATIENT
Start: 2019-08-15 | End: 2019-08-15

## 2019-08-15 RX ORDER — TRAMADOL HYDROCHLORIDE 50 MG/1
50 TABLET ORAL EVERY 6 HOURS PRN
Status: DISCONTINUED | OUTPATIENT
Start: 2019-08-15 | End: 2019-08-21 | Stop reason: HOSPADM

## 2019-08-15 RX ORDER — LEVALBUTEROL INHALATION SOLUTION 1.25 MG/3ML
1.25 SOLUTION RESPIRATORY (INHALATION)
Status: DISCONTINUED | OUTPATIENT
Start: 2019-08-15 | End: 2019-08-21 | Stop reason: HOSPADM

## 2019-08-15 RX ORDER — IPRATROPIUM BROMIDE AND ALBUTEROL SULFATE 2.5; .5 MG/3ML; MG/3ML
SOLUTION RESPIRATORY (INHALATION)
Status: ACTIVE
Start: 2019-08-15 | End: 2019-08-16

## 2019-08-15 RX ORDER — AMOXICILLIN AND CLAVULANATE POTASSIUM 875; 125 MG/1; MG/1
1 TABLET, FILM COATED ORAL EVERY 12 HOURS
Status: COMPLETED | OUTPATIENT
Start: 2019-08-15 | End: 2019-08-20

## 2019-08-15 RX ADMIN — SODIUM BICARBONATE 650 MG: 325 TABLET ORAL at 17:08

## 2019-08-15 RX ADMIN — TRAMADOL HYDROCHLORIDE 50 MG: 50 TABLET, FILM COATED ORAL at 18:37

## 2019-08-15 RX ADMIN — ACETAMINOPHEN 650 MG: 325 TABLET, FILM COATED ORAL at 05:04

## 2019-08-15 RX ADMIN — LEVALBUTEROL 1.25 MG: 1.25 SOLUTION RESPIRATORY (INHALATION) at 14:55

## 2019-08-15 RX ADMIN — IPRATROPIUM BROMIDE AND ALBUTEROL SULFATE 3 ML: .5; 3 SOLUTION RESPIRATORY (INHALATION) at 01:51

## 2019-08-15 RX ADMIN — IPRATROPIUM BROMIDE AND ALBUTEROL SULFATE 3 ML: .5; 3 SOLUTION RESPIRATORY (INHALATION) at 18:08

## 2019-08-15 RX ADMIN — BUSPIRONE HYDROCHLORIDE 5 MG: 5 TABLET ORAL at 08:40

## 2019-08-15 RX ADMIN — METHYLPREDNISOLONE SODIUM SUCCINATE 40 MG: 40 INJECTION, POWDER, FOR SOLUTION INTRAMUSCULAR; INTRAVENOUS at 08:44

## 2019-08-15 RX ADMIN — ZIPRASIDONE HYDROCHLORIDE 80 MG: 80 CAPSULE ORAL at 08:39

## 2019-08-15 RX ADMIN — INSULIN HUMAN 4 UNITS: 100 INJECTION, SOLUTION PARENTERAL at 18:41

## 2019-08-15 RX ADMIN — SODIUM BICARBONATE 325 MG: 325 TABLET ORAL at 12:34

## 2019-08-15 RX ADMIN — SENNOSIDES, DOCUSATE SODIUM 2 TABLET: 50; 8.6 TABLET, FILM COATED ORAL at 17:09

## 2019-08-15 RX ADMIN — METHOCARBAMOL TABLETS 750 MG: 750 TABLET, COATED ORAL at 12:33

## 2019-08-15 RX ADMIN — FLUOXETINE HYDROCHLORIDE 20 MG: 20 CAPSULE ORAL at 05:09

## 2019-08-15 RX ADMIN — AMOXICILLIN AND CLAVULANATE POTASSIUM 1 TABLET: 875; 125 TABLET, FILM COATED ORAL at 19:58

## 2019-08-15 RX ADMIN — PREGABALIN 300 MG: 150 CAPSULE ORAL at 17:07

## 2019-08-15 RX ADMIN — INSULIN HUMAN 3 UNITS: 100 INJECTION, SOLUTION PARENTERAL at 12:39

## 2019-08-15 RX ADMIN — METHYLPREDNISOLONE SODIUM SUCCINATE 40 MG: 40 INJECTION, POWDER, FOR SOLUTION INTRAMUSCULAR; INTRAVENOUS at 20:13

## 2019-08-15 RX ADMIN — IPRATROPIUM BROMIDE AND ALBUTEROL SULFATE 3 ML: .5; 3 SOLUTION RESPIRATORY (INHALATION) at 23:45

## 2019-08-15 RX ADMIN — GABAPENTIN 300 MG: 300 CAPSULE ORAL at 08:37

## 2019-08-15 RX ADMIN — INSULIN HUMAN 2 UNITS: 100 INJECTION, SOLUTION PARENTERAL at 05:12

## 2019-08-15 RX ADMIN — METHOCARBAMOL TABLETS 750 MG: 750 TABLET, COATED ORAL at 05:15

## 2019-08-15 RX ADMIN — LEVOTHYROXINE SODIUM 75 MCG: 75 TABLET ORAL at 05:06

## 2019-08-15 RX ADMIN — GABAPENTIN 300 MG: 300 CAPSULE ORAL at 12:32

## 2019-08-15 RX ADMIN — FUROSEMIDE 80 MG: 40 TABLET ORAL at 05:05

## 2019-08-15 RX ADMIN — IPRATROPIUM BROMIDE AND ALBUTEROL SULFATE 3 ML: .5; 3 SOLUTION RESPIRATORY (INHALATION) at 07:40

## 2019-08-15 RX ADMIN — BUSPIRONE HYDROCHLORIDE 5 MG: 5 TABLET ORAL at 17:06

## 2019-08-15 RX ADMIN — IPRATROPIUM BROMIDE AND ALBUTEROL SULFATE 3 ML: .5; 3 SOLUTION RESPIRATORY (INHALATION) at 12:42

## 2019-08-15 RX ADMIN — MYCOPHENOLATE MOFETIL 500 MG: 250 CAPSULE ORAL at 17:09

## 2019-08-15 RX ADMIN — GABAPENTIN 300 MG: 300 CAPSULE ORAL at 17:07

## 2019-08-15 RX ADMIN — METHYLPREDNISOLONE SODIUM SUCCINATE 40 MG: 40 INJECTION, POWDER, FOR SOLUTION INTRAMUSCULAR; INTRAVENOUS at 02:08

## 2019-08-15 RX ADMIN — MYCOPHENOLATE MOFETIL 500 MG: 250 CAPSULE ORAL at 00:00

## 2019-08-15 RX ADMIN — SODIUM BICARBONATE 325 MG: 325 TABLET ORAL at 00:00

## 2019-08-15 RX ADMIN — MYCOPHENOLATE MOFETIL 500 MG: 250 CAPSULE ORAL at 08:41

## 2019-08-15 RX ADMIN — METHOCARBAMOL TABLETS 750 MG: 750 TABLET, COATED ORAL at 17:08

## 2019-08-15 RX ADMIN — METHYLPREDNISOLONE SODIUM SUCCINATE 40 MG: 40 INJECTION, POWDER, FOR SOLUTION INTRAMUSCULAR; INTRAVENOUS at 12:33

## 2019-08-15 RX ADMIN — FAMOTIDINE 40 MG: 20 TABLET ORAL at 05:05

## 2019-08-15 RX ADMIN — SODIUM BICARBONATE 650 MG: 325 TABLET ORAL at 08:42

## 2019-08-15 RX ADMIN — ASPIRIN 81 MG: 81 TABLET, COATED ORAL at 05:06

## 2019-08-15 RX ADMIN — SULFAMETHOXAZOLE AND TRIMETHOPRIM 1 TABLET: 800; 160 TABLET ORAL at 08:38

## 2019-08-15 RX ADMIN — ZIPRASIDONE HYDROCHLORIDE 80 MG: 80 CAPSULE ORAL at 17:10

## 2019-08-15 RX ADMIN — PREGABALIN 300 MG: 150 CAPSULE ORAL at 08:37

## 2019-08-15 RX ADMIN — MORPHINE SULFATE 2 MG: 4 INJECTION INTRAVENOUS at 13:57

## 2019-08-15 RX ADMIN — INSULIN HUMAN 1 UNITS: 100 INJECTION, SOLUTION PARENTERAL at 00:01

## 2019-08-15 RX ADMIN — TRAZODONE HYDROCHLORIDE 100 MG: 50 TABLET ORAL at 21:51

## 2019-08-15 RX ADMIN — TRAMADOL HYDROCHLORIDE 50 MG: 50 TABLET, FILM COATED ORAL at 08:48

## 2019-08-15 RX ADMIN — GABAPENTIN 300 MG: 300 CAPSULE ORAL at 21:51

## 2019-08-15 ASSESSMENT — ENCOUNTER SYMPTOMS
SHORTNESS OF BREATH: 1
WEAKNESS: 1
NAUSEA: 0
WHEEZING: 1
COUGH: 1
ABDOMINAL PAIN: 0
MYALGIAS: 1
NERVOUS/ANXIOUS: 1
PALPITATIONS: 1
EYE PAIN: 0
SORE THROAT: 0
EYE DISCHARGE: 0
FEVER: 0
CHILLS: 0
DIAPHORESIS: 1
VOMITING: 0
HEADACHES: 1

## 2019-08-15 ASSESSMENT — COGNITIVE AND FUNCTIONAL STATUS - GENERAL
DRESSING REGULAR LOWER BODY CLOTHING: A LITTLE
SUGGESTED CMS G CODE MODIFIER DAILY ACTIVITY: CJ
HELP NEEDED FOR BATHING: A LITTLE
TOILETING: A LITTLE
DAILY ACTIVITIY SCORE: 21

## 2019-08-15 ASSESSMENT — ACTIVITIES OF DAILY LIVING (ADL): TOILETING: INDEPENDENT

## 2019-08-15 NOTE — THERAPY
"Occupational Therapy Evaluation completed.   Functional Status: Spv w/bed mobility, declined LB dressing, spv w/sit>stand walking in room to sink w/fww and spv, completed grooming standing at sink w/spv. After ~5 min reported discomfort w/chest pressure and elevated HR. Returned to EOB, 's which is baseline from start of this session. BTB w/spv. Grandmother at bedside.    Discussed that pt has been having increased falls at home. Pt has all needed DME, however is having increasing generalized debility related to poor overall health. Encouraged pt to complete ADL's at a seated level instead of standing to conserve energy and decrease falls. Pt was receptive, pt needs significant life style changes given her age and severity of her medical issues.   Plan of Care: Will benefit from Occupational Therapy 1 times per week  Discharge Recommendations:  Equipment: Will Continue to Assess for Equipment Needs. Post-acute therapy Recommend home health transitional care for continued occupational therapy services.       See \"Rehab Therapy-Acute\" Patient Summary Report for complete documentation.      28 yr old female admitted for SOB, pt has a complex medical hx including: asthma, hypothyroidism, peripheral neuropathy, DM, obesity, TONYA chronic inflammatory arthritis, and schizophrenia. This admission pt is being treated for asthma exacerbation, at present pt is limited by activity tolerance/endurance and on going SOB, pt reports she has a WC, BSC, and shower bench and would be able to complete most ADL's in a seated position, pt reports that she was receiving care giving, but will likely not be able to afford it much longer. Pt appears to need social supports as well as assist w/life style or overall health management. Recommend HH to maximize safety at d/c   "

## 2019-08-15 NOTE — H&P
Hospital Medicine History & Physical Note    Date of Service  8/14/2019    Primary Care Physician  Torres Brody M.D.    Consultants  none    Code Status  full    Chief Complaint  Shortness of breath     History of Presenting Illness  28 y.o. female who presented 8/14/2019 with complex past medical history including asthma on home oxygen, hypothyroidism, acid reflux, peripheral neuropathy, diabetes, morbid obesity, chronic inflammatory arthritis, progressive focal motor weakness, PCOS, schizophrenia presents with shortness of breath.  This patient's had worsening shortness of breath over the last 2 weeks.  She had a cough with green sputum.  She is also had significant amount of wheezing.  She does have history of asthma and usually gets flares when she has allergies.  However asthma is getting worse.  She is becoming more short of breath.  She states she is on chronic prednisone without any improvement of her symptoms.  No fever no chills no sweats.  She will be admitted to the hospital with asthma exacerbation.    Review of Systems  Review of Systems   Constitutional: Positive for malaise/fatigue. Negative for chills and fever.   HENT: Negative for congestion, hearing loss and tinnitus.    Eyes: Negative for blurred vision, double vision and discharge.   Respiratory: Positive for cough, sputum production, shortness of breath and wheezing. Negative for hemoptysis.    Cardiovascular: Negative for chest pain, palpitations and leg swelling.   Gastrointestinal: Negative for abdominal pain, heartburn, nausea and vomiting.   Genitourinary: Negative for dysuria and flank pain.   Musculoskeletal: Negative for joint pain and myalgias.   Skin: Negative for rash.   Neurological: Positive for sensory change, focal weakness and weakness. Negative for dizziness and speech change.   Endo/Heme/Allergies: Negative for environmental allergies. Does not bruise/bleed easily.   Psychiatric/Behavioral: Negative for depression,  hallucinations and substance abuse.       Past Medical History   has a past medical history of Abdominal pain, Anginal syndrome, Apnea, sleep, Arrhythmia, Arthritis, ASTHMA, Atrial fibrillation (HCC), Back pain, Borderline personality disorder ([facility]), Breath shortness, Bronchitis, Cardiac arrhythmia, Chickenpox, Chronic UTI (9/18/2010), Cough, Daytime sleepiness, Depression, Diabetes (HCC), Diarrhea, Disorder of thyroid, Fall, Fatigue, Frequent headaches, Gasping for breath, Gynecological disorder, Headache(784.0), Heart burn, History of falling, Hypertension, Indigestion, Migraine, 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HCC), 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[facility]), Nausea, Obesity, Pain (08-15-12), Painful joint, PCOS (polycystic ovarian syndrome), Pneumonia (2012), Psychosis ([facility]), Renal disorder, Ringing in ears, Scoliosis, Shortness of breath, Sinus tachycardia (10/31/2013), Sleep apnea, Snoring, Tonsillitis, Tuberculosis, Urinary bladder disorder, Urinary incontinence, Weakness, and Wears glasses.    Surgical History   has a past surgical history that includes neuro dest facet l/s w/ig sngl (4/21/2015); recovery (1/27/2016); delmar by laparoscopy (8/29/2010); lumbar fusion anterior (8/21/2012); other cardiac surgery (1/2016); tonsillectomy; bowel stimulator insertion (7/13/2016); gastroscopy with balloon dilatation (N/A, 1/4/2017); muscle biopsy (Right, 1/26/2017); other abdominal surgery; and laminotomy.     Family History  family history includes Genitourinary () Problems in her sister; Heart Disease in her maternal grandmother and mother; Hypertension in her maternal grandmother, maternal uncle, and mother; No Known Problems in her sister; Other in her mother and sister; Sleep Apnea in her mother.     Social History   reports that she has never smoked. She has never used smokeless tobacco. She reports that she has current or past drug history. Drugs: Marijuana and Oral. Frequency: 7.00 times per week. She reports that she does  "not drink alcohol.    Allergies  Allergies   Allergen Reactions   • Cefdinir Shortness of Breath and Itching     Tolerated 1/18/17  Tolerates ceftriaxone    • Depakote [Divalproex Sodium] Unspecified     Muscle spasms/muscle pain and weakness     • Doxycycline Anaphylaxis and Vomiting     RXN=unknown   • Abilify Unspecified     Headaches/muscle twitching     • Amitriptyline Unspecified     Headaches     • Amoxicillin Rash     Pt states \"I get a rash\".     • Ciprofloxacin Rash     Pt states \"I get a rash\".     • Clindamycin Nausea     Even with food     • Ees [Erythromycin] Vomiting and Nausea   • Flagyl [Metronidazole Hcl] Unspecified     \"eye problems\"     • Flomax [Tamsulosin Hydrochloride] Swelling   • Metformin Unspecified     Increased lactic acid      • Tape Rash     Tears skin off  coban with Tegaderm tape ok intermittently  RXN=ongoing   • Vancomycin Itching     Pt becomes flushed in face and chest.   RXN=7/10/16   • Wound Dressing Adhesive Hives     By pt report   • Cephalexin [Keflex] Rash     Pt states she gets a rash with this medication  Tolerates ceftriaxone   • Levofloxacin Unspecified     Leg muscle cramps   • Metronidazole Rash     .   • Valproic Acid Rash     .       Medications  Prior to Admission Medications   Prescriptions Last Dose Informant Patient Reported? Taking?   Diclofenac Sodium 1 % Gel 8/14/2019 at Unknown time Patient Yes No   Sig: Apply 1 Application to skin as directed 4 times a day. Apply's on wrist, back, ankles, and feet.   Diphenhydramine-APAP, sleep, (TYLENOL PM EXTRA STRENGTH PO) 8/14/2019 at Unknown time Patient Yes No   Sig: Take 2 Caps by mouth every day.   Dulaglutide (TRULICITY) 0.75 MG/0.5ML Solution Pen-injector 8/14/2019 at Unknown time Patient Yes No   Sig: Inject 0.75 mg as instructed every 7 days. On Friday   FLUoxetine (PROZAC) 20 MG Cap 8/14/2019 at Unknown time  No No   Sig: Take 1 Cap by mouth every day.   LYRICA 300 MG capsule 8/14/2019 at Unknown time Patient " Yes No   Sig: Take 300 mg by mouth 2 times a day.   Melatonin 5 MG Tab 8/13/2019 at Unknown time Patient Yes No   Sig: Take 10 mg by mouth every day.   albuterol 108 (90 Base) MCG/ACT Aero Soln inhalation aerosol 8/14/2019 at Unknown time Patient Yes No   Sig: Inhale 2 Puffs by mouth every 6 hours as needed for Shortness of Breath.   aspirin EC (ECOTRIN) 81 MG Tablet Delayed Response 8/14/2019 at Unknown time Patient No No   Sig: Take 1 Tab by mouth every day.   busPIRone (BUSPAR) 5 MG tablet 8/14/2019 at Unknown time Patient No No   Sig: TAKE ONE TABLET BY MOUTH TWICE DAILY   etonogestrel (NEXPLANON) 68 MG Implant implant 8/14/2019 at Unknown time  Yes No   Sig: Inject 1 Each as instructed Once.   folic acid (FOLVITE) 1 MG Tab 8/14/2019 at Unknown time Patient No No   Sig: Take 1 Tab by mouth every day.   furosemide (LASIX) 80 MG Tab 8/14/2019 at Unknown time Patient Yes No   Sig: Take 80 mg by mouth every day.   gabapentin (NEURONTIN) 300 MG Cap 8/14/2019 at Unknown time  Yes No   Sig: Take 300 mg by mouth 4 times a day.   ivabradine (CORLANOR) 5 MG Tab tablet 8/14/2019 at Unknown time Patient No No   Sig: Take 1.5 Tabs by mouth 2 times a day, with meals.   levothyroxine (SYNTHROID) 75 MCG Tab 8/14/2019 at Unknown time  No No   Sig: Take 1 Tab by mouth Every morning on an empty stomach.   methocarbamol (ROBAXIN) 750 MG Tab 8/14/2019 at Unknown time Patient Yes No   Sig: Take 750 mg by mouth 3 times a day.   mupirocin (BACTROBAN) 2 % Ointment 8/14/2019 at Unknown time  No No   Sig: Apply 1 Application to affected area(s) 2 times a day.   mycophenolate (CELLCEPT) 500 MG tablet 8/14/2019 at Unknown time  Yes No   Sig: Take 500 mg by mouth 2 times a day.   ondansetron (ZOFRAN) 4 MG Tab tablet 8/14/2019 at Unknown time Patient No No   Sig: Take 1 Tab by mouth every four hours as needed for Nausea/Vomiting.   phenazopyridine (PYRIDIUM) 200 MG Tab 8/14/2019 at Unknown time Patient No No   Sig: Take 1 Tab by mouth 3 times  a day as needed.   predniSONE (DELTASONE) 10 MG Tab 8/14/2019 at Unknown time Patient Yes No   Sig: Take 40 mg by mouth every day. Pt started on 7/3/2019  X 3 weeks then 25 mg daily x 3 weeks then 20 mg x 3 weeks then 15 mg daily as prescribed by Dr. Newton.    ranitidine (ZANTAC) 300 MG tablet 8/14/2019 at Unknown time  No No   Sig: Take 1 Tab by mouth every day.   sodium bicarbonate 325 MG Tab 8/14/2019 at Unknown time Patient Yes No   Sig: Take 325-650 mg by mouth 3 times a day. 650 mg in AM  325 mg mid-afternoon  650 mg at night   traZODone (DESYREL) 100 MG Tab 8/13/2019 at Unknown time Patient No No   Sig: TAKE  ONE TABLET BY MOUTH NIGHTLY AT BEDTIME   ziprasidone (GEODON) 80 MG Cap 8/14/2019 at Unknown time Patient Yes No   Sig: Take 80 mg by mouth 2 Times a Day.      Facility-Administered Medications: None       Physical Exam  Temp:  [36.5 °C (97.7 °F)] 36.5 °C (97.7 °F)  Pulse:  [94-98] 98  Resp:  [18-21] 19  BP: (138-149)/(77-92) 138/86  SpO2:  [93 %-99 %] 99 %    Physical Exam   Constitutional: She is oriented to person, place, and time. She appears well-developed and well-nourished. She appears distressed.   HENT:   Head: Normocephalic and atraumatic.   Eyes: Pupils are equal, round, and reactive to light. Conjunctivae and EOM are normal.   Neck: Normal range of motion. Neck supple. No JVD present.   Cardiovascular: Normal rate, regular rhythm, normal heart sounds and intact distal pulses.   No murmur heard.  Pulmonary/Chest: She is in respiratory distress. She has wheezes.   Abdominal: Soft. Bowel sounds are normal. She exhibits no distension. There is no tenderness.   Musculoskeletal: She exhibits edema.   Right leg weakness   Neurological: She is alert and oriented to person, place, and time. She exhibits normal muscle tone.   Skin: Skin is warm and dry.   Psychiatric: She has a normal mood and affect. Her behavior is normal. Judgment and thought content normal.   Nursing note and vitals  reviewed.      Laboratory:  Recent Labs     08/14/19  1428   WBC 6.1   RBC 4.28   HEMOGLOBIN 13.6   HEMATOCRIT 40.2   MCV 93.9   MCH 31.8   MCHC 33.8   RDW 48.7   PLATELETCT 197   MPV 11.1     Recent Labs     08/14/19  1428   SODIUM 137   POTASSIUM 4.1   CHLORIDE 107   CO2 17*   GLUCOSE 199*   BUN 12   CREATININE 0.67   CALCIUM 10.0     Recent Labs     08/14/19  1428   ALTSGPT 43   ASTSGOT 18   ALKPHOSPHAT 33   TBILIRUBIN 0.6   GLUCOSE 199*     Recent Labs     08/14/19  1428   APTT 26.2   INR 0.94     Recent Labs     08/14/19  1428   NTPROBNP 113         Recent Labs     08/14/19  1428   TROPONINT <6       Urinalysis:    No results found     Imaging:  DX-CHEST-PORTABLE (1 VIEW)   Final Result      No acute cardiopulmonary abnormality.            Assessment/Plan:  I anticipate this patient is appropriate for observation status at this time.    * Asthma with exacerbation  Assessment & Plan  Acute exacerbation in patient who is immunocompromised and multiple comorbid conditions   No evidence of acute infection except skin wounds noted  IV steroids for now   Duo nebs   02 to keep sats 88-92%  Needs outpatient PFTS to be done       Breast wound- (present on admission)  Assessment & Plan  She has multiple wounds over breast and trunk seem self inflicted   Will order for empiric bactrim for now given immunosupression   Wound care consult     Controlled type 2 diabetes mellitus without complication, without long-term current use of insulin (HCC)- (present on admission)  Assessment & Plan  SSI ordered  Hold home medications   accu checks     Optic neuritis- (present on admission)  Assessment & Plan  On celcept and chronic steroids     Chronic respiratory failure with hypoxia, on home oxygen therapy (HCC)- (present on admission)  Assessment & Plan  At baseline home 02 demand   Does have respiratory distress probably needs ambulatory study prior to d/c    TONYA (obstructive sleep apnea)- (present on admission)  Assessment &  Plan  Needs follow up for CPAP machine     Acquired hypothyroidism- (present on admission)  Assessment & Plan  Resume home levothyroxine   Will recheck tsh last was elevated    GERD (gastroesophageal reflux disease)- (present on admission)  Assessment & Plan  Resume home pepcid    Peripheral neuropathy (CMS-HCC)- (present on admission)  Assessment & Plan  Resume home lyrica and gabapentin     Schizophrenia (HCC)- (present on admission)  Assessment & Plan  Hx of resume home medications     PCOS (polycystic ovarian syndrome)- (present on admission)  Assessment & Plan  Hx of     Progressive focal motor weakness- (present on admission)  Assessment & Plan  Hx of needs pt/ot evaluation     Anxiety- (present on admission)  Assessment & Plan  Resume home buspar and prozac      VTE prophylaxis: heparin

## 2019-08-15 NOTE — PROGRESS NOTES
Pt transported from ED via gurney,a/o,assessment completed,poc discussed,verbalized understanding,tolerating po well,ht meal given,hooked to the monitor,call button within reach,wll continue to monitor.

## 2019-08-15 NOTE — ASSESSMENT & PLAN NOTE
SSI ordered  Hold home medications   accu checks   -monitor sugars closely while on steroids for respiratory issues  -lantus 10 units daily, BS's are acceptable at this time

## 2019-08-15 NOTE — PROGRESS NOTES
Pt states left sided chest pain 9/10. The pain is not radiating.  She describes it as cain.   (getting RT treatments), RR 22, /60.  Dr petersen notified received T/O for EKG and RT treatment.

## 2019-08-15 NOTE — FLOWSHEET NOTE
08/15/19 1456   SVN Group   #SVN Performed Yes   Given By: Mouthpiece   Chest Exam   Respiration 20   Pulse (!) 105   Breath Sounds   RUL Breath Sounds Diminished   RML Breath Sounds Diminished   RLL Breath Sounds Diminished   MARY Breath Sounds Diminished   LLL Breath Sounds Diminished   Oximetry   Continuous Oximetry Yes   O2 Alarms Set & Reviewed Yes   Oxygen   Pulse Oximetry 96 %   O2 (LPM) 3   O2 Daily Delivery Respiratory  Silicone Nasal Cannula

## 2019-08-15 NOTE — PROGRESS NOTES
Received report from evening RN.  Pt is A/Ox4.  Respirations are even with mild labor.  Pt is able to hold conversation without SOB, per pt she is improving.  Plan of care reviewed, verbalized understanding.  Will continue to closely monitor. Call light within reach.

## 2019-08-15 NOTE — PROGRESS NOTES
[facility] Medicine Daily Progress Note    Date of Service  8/15/2019    Chief Complaint  28 y.o. female admitted 8/14/2019 with Shortness of breath.    Hospital Course    Patient is a 28-year-old female with known medical history of asthma on home oxygen, hypothyroidism, acid reflux, peripheral neuropathy, diabetes, morbid obesity, chronic inflammatory arthritis, progressive focal motor weakness, PCOS, schizophrenia and optic neuritis.  Patient presented to the emergency room with worsening shortness of breath over the last 2 weeks accompanied with productive sputum.  Patient states she noticed a significant amount of wheezing and has history of asthma with flares when she has allergies therefore presented for further evaluation and monitoring.  Patient was admitted to CDU for further work-up and monitoring.  Patient was continued on her home oxygen dosage.  RT protocol was initiated and patient was placed on IV steroids.  Chest x-ray was obtained which shows no acute cardiopulmonary abnormalities. Patient was continued on home medications and initiated on symptom management with antiemetics, analgesics and frequent breathing treatments.        Interval Problem Update  8/15- Patient continues to have increased work of breathing with frequent nebulizer treatments. Lungs remain diminished with intermittent wheezing noted. Rash noted to left breast and abdomen appears self inflicted patient to continue on bactrim. Complained of chest pain this afternoon, EKG shows tachycardia otherwise no acute findings. Monitor overnight, pending PT /OT evaluations.     Consultants/Specialty  None     Code Status  Full     Disposition  Monitor overnight     Review of Systems  Review of Systems   Constitutional: Positive for diaphoresis and malaise/fatigue. Negative for chills and fever.   HENT: Positive for congestion. Negative for sore throat.    Eyes: Negative for pain and discharge.   Respiratory: Positive for cough, shortness of  breath and wheezing.    Cardiovascular: Positive for chest pain and palpitations.   Gastrointestinal: Negative for abdominal pain, nausea and vomiting.   Genitourinary: Negative for dysuria, frequency and urgency.   Musculoskeletal: Positive for myalgias.   Neurological: Positive for weakness and headaches.   Psychiatric/Behavioral: The patient is nervous/anxious.         Physical Exam  Temp:  [36.4 °C (97.5 °F)-37.2 °C (99 °F)] 36.5 °C (97.7 °F)  Pulse:  [] 113  Resp:  [14-22] 22  BP: (115-155)/(59-88) 115/59  SpO2:  [94 %-99 %] 96 %    Physical Exam   Constitutional: She is oriented to person, place, and time. She appears well-developed. She is cooperative. She appears ill. Nasal cannula in place.   Patient appears older than stated age, resting in bed very anxious    HENT:   Mouth/Throat: Mucous membranes are dry.   Chong faced appearance    Eyes: Conjunctivae and lids are normal.   Neck: Neck supple. No tracheal tenderness present.   Cardiovascular: Regular rhythm and normal heart sounds. Tachycardia present. Exam reveals no gallop.   Pulmonary/Chest: She is in respiratory distress. She has decreased breath sounds. She has wheezes.   Abdominal: Soft. Bowel sounds are normal. She exhibits no distension. There is no tenderness.   Musculoskeletal:   NEWTON   Neurological: She is alert and oriented to person, place, and time.   Skin: Skin is warm and intact.   Wounds to left breast/ chest Picture in chart    Psychiatric: Her mood appears anxious. Her speech is rapid and/or pressured. She is hyperactive.   Nursing note and vitals reviewed.          Fluids  No intake or output data in the 24 hours ending 08/15/19 1444    Laboratory  Recent Labs     08/14/19  1428 08/15/19  0543   WBC 6.1 6.8   RBC 4.28 4.13*   HEMOGLOBIN 13.6 12.9   HEMATOCRIT 40.2 39.1   MCV 93.9 94.7   MCH 31.8 31.2   MCHC 33.8 33.0*   RDW 48.7 49.0   PLATELETCT 197 165   MPV 11.1 11.1     Recent Labs     08/14/19  1428 08/15/19  0543   SODIUM 137  134*   POTASSIUM 4.1 4.0   CHLORIDE 107 106   CO2 17* 15*   GLUCOSE 199* 237*   BUN 12 9   CREATININE 0.67 0.57   CALCIUM 10.0 9.6     Recent Labs     08/14/19  1428   APTT 26.2   INR 0.94               Imaging  DX-CHEST-PORTABLE (1 VIEW)   Final Result      No acute cardiopulmonary abnormality.           Assessment/Plan  * Asthma with exacerbation  Assessment & Plan  Acute exacerbation in patient who is immunocompromised and multiple comorbid conditions   No evidence of acute infection except skin wounds noted  IV steroids for now   Duo nebs / Xopenex   02 to keep sats 88-92%  Needs outpatient PFTS to be done       Breast wound- (present on admission)  Assessment & Plan  She has multiple wounds over breast and trunk seem self inflicted   Will order for empiric bactrim for now given immunosupression   Wound care consult     Controlled type 2 diabetes mellitus without complication, without long-term current use of insulin ([facility])- (present on admission)  Assessment & Plan  SSI ordered  Hold home medications   accu checks     Optic neuritis- (present on admission)  Assessment & Plan  On celcept and chronic steroids     Chronic respiratory failure with hypoxia, on home oxygen therapy (HCC)- (present on admission)  Assessment & Plan  At baseline home 02 demand   Does have respiratory distress probably needs ambulatory study prior to d/c  PT to evaluate     TONYA (obstructive sleep apnea)- (present on admission)  Assessment & Plan  Needs follow up for CPAP machine     Acquired hypothyroidism- (present on admission)  Assessment & Plan  Resume home levothyroxine   TSH within normal limits     GERD (gastroesophageal reflux disease)- (present on admission)  Assessment & Plan  Resume home pepcid    Peripheral neuropathy (CMS-[facility])- (present on admission)  Assessment & Plan  Resume home lyrica and gabapentin     Schizophrenia ([facility])- (present on admission)  Assessment & Plan  Hx of resume home medications     PCOS (polycystic  ovarian syndrome)- (present on admission)  Assessment & Plan  Hx of     Progressive focal motor weakness- (present on admission)  Assessment & Plan  Hx of  PT/OT evaluations pending    Anxiety- (present on admission)  Assessment & Plan  Resume home buspar and prozac       VTE prophylaxis: SCD

## 2019-08-15 NOTE — FLOWSHEET NOTE
08/15/19 0741   Interdisciplinary Plan of Care-Goals (Indications)   Bronchodilator Indications Physical Exam / Hyperinflation / Wheezing (bronchospasm)   Interdisciplinary Plan of Care-Outcomes    Bronchodilator Outcome Patient at Stable Baseline   Education   Education Yes - Pt. / Family has been Instructed in use of Respiratory Equipment   RT Assessment of Delivered Medications   Evaluation of Medication Delivery Daily Yes-- Pt /Family has been Instructed in use of Respiratory Medications and Adverse Reactions   SVN Group   #SVN Performed Yes   Given By: Mouthpiece   Date SVN Last Changed 08/15/19   Date SVN Next Change Due (Q 7 Days) 08/22/19   Chest Exam   Respiration 18   Pulse (!) 106   Breath Sounds   RUL Breath Sounds Clear   RML Breath Sounds Clear   RLL Breath Sounds Diminished   MARY Breath Sounds Clear   LLL Breath Sounds Diminished   Oximetry   Continuous Oximetry Yes   O2 Alarms Set & Reviewed Yes   Oxygen   Pulse Oximetry 98 %   O2 (LPM) 3   O2 Daily Delivery Respiratory  Silicone Nasal Cannula

## 2019-08-15 NOTE — ASSESSMENT & PLAN NOTE
She has multiple wounds over breast and trunk seem self inflicted   -on oral augmentin, appears to be tolerating well  Wound care consult

## 2019-08-15 NOTE — DISCHARGE PLANNING
Care Transition Team Assessment    Spoke with patient at bedside. Has Care giver, Lives with grandparents and Aunt and Uncle. Uses Home O2 PRN 2-4L with [facility]. Family will be ride @ D/C.    Information Source  Orientation : Oriented x 4  Information Given By: Patient    Readmission Evaluation  Is this a readmission?: No    Interdisciplinary Discharge Planning  Does Admitting Nurse Feel This Could be a Complex Discharge?: No  Primary Care Physician: Torres Brody  Lives with - Patient's Self Care Capacity: Other (Comments)(Grandparents and Aunt and Uncle)  Patient or legal guardian wants to designate a caregiver (see row info): Yes  Caregiver name: [facility]  Caregiver relationship to patient: facility  Support Systems: Family Member(s)  Housing / Facility: [address]  Do You Take your Prescribed Medications Regularly: Yes  Able to Return to Previous ADL's: Yes  Mobility Issues: Yes  Prior Services: [facility])  Patient Expects to be Discharged to:: Home  Assistance Needed: No  Durable Medical Equipment: Home Oxygen, Walker, Commode, Other - Specify(W/C, Chair Slide, Shower chair )  DME Provider / Phone: [facility]    Discharge Preparedness  What are your discharge supports?: Grandparent, Other (comment)(Aunt and Uncle)  Prior Functional Level: Uses Walker, Uses Wheelchair    Functional Assesment  Prior Functional Level: Uses Walker, Uses Wheelchair    Finances  Prescription Coverage: Yes    Anticipated Discharge Information  Anticipated discharge disposition: Home  Discharge Address: [address] [place] [address]   Discharge Contact Phone Number: [phone]

## 2019-08-15 NOTE — ASSESSMENT & PLAN NOTE
-nearly resolved, home tomorrow when nebulizer delivered  No evidence of acute infection except skin wounds noted  Continue  prednisone 40 mg daily, tolerating well, day#4, short taper at home  -BD's as outlined below  Duo nebs / Xopenex   02 to keep sats 88-92%  Needs outpatient PFTS to be done   Symbicort ordered for home regimen  -Pa lateral CXR shows B/L clear lungs

## 2019-08-16 LAB
GLUCOSE BLD-MCNC: 218 MG/DL (ref 65–99)
GLUCOSE BLD-MCNC: 242 MG/DL (ref 65–99)
GLUCOSE BLD-MCNC: 262 MG/DL (ref 65–99)
GLUCOSE BLD-MCNC: 284 MG/DL (ref 65–99)
GLUCOSE BLD-MCNC: 293 MG/DL (ref 65–99)

## 2019-08-16 PROCEDURE — 700102 HCHG RX REV CODE 250 W/ 637 OVERRIDE(OP): Performed by: INTERNAL MEDICINE

## 2019-08-16 PROCEDURE — A9270 NON-COVERED ITEM OR SERVICE: HCPCS | Performed by: INTERNAL MEDICINE

## 2019-08-16 PROCEDURE — 94640 AIRWAY INHALATION TREATMENT: CPT

## 2019-08-16 PROCEDURE — 94760 N-INVAS EAR/PLS OXIMETRY 1: CPT

## 2019-08-16 PROCEDURE — 700111 HCHG RX REV CODE 636 W/ 250 OVERRIDE (IP): Performed by: HOSPITALIST

## 2019-08-16 PROCEDURE — A9270 NON-COVERED ITEM OR SERVICE: HCPCS | Performed by: HOSPITALIST

## 2019-08-16 PROCEDURE — 700101 HCHG RX REV CODE 250: Performed by: INTERNAL MEDICINE

## 2019-08-16 PROCEDURE — 82962 GLUCOSE BLOOD TEST: CPT

## 2019-08-16 PROCEDURE — 700102 HCHG RX REV CODE 250 W/ 637 OVERRIDE(OP): Performed by: NURSE PRACTITIONER

## 2019-08-16 PROCEDURE — 99232 SBSQ HOSP IP/OBS MODERATE 35: CPT | Performed by: INTERNAL MEDICINE

## 2019-08-16 PROCEDURE — 700111 HCHG RX REV CODE 636 W/ 250 OVERRIDE (IP): Performed by: INTERNAL MEDICINE

## 2019-08-16 PROCEDURE — A9270 NON-COVERED ITEM OR SERVICE: HCPCS | Performed by: NURSE PRACTITIONER

## 2019-08-16 PROCEDURE — 700102 HCHG RX REV CODE 250 W/ 637 OVERRIDE(OP): Performed by: HOSPITALIST

## 2019-08-16 PROCEDURE — 97161 PT EVAL LOW COMPLEX 20 MIN: CPT

## 2019-08-16 PROCEDURE — 770006 HCHG ROOM/CARE - MED/SURG/GYN SEMI*

## 2019-08-16 RX ORDER — INSULIN GLARGINE 100 [IU]/ML
10 INJECTION, SOLUTION SUBCUTANEOUS EVERY EVENING
Status: DISCONTINUED | OUTPATIENT
Start: 2019-08-16 | End: 2019-08-21 | Stop reason: HOSPADM

## 2019-08-16 RX ORDER — PREDNISONE 20 MG/1
40 TABLET ORAL DAILY
Status: DISCONTINUED | OUTPATIENT
Start: 2019-08-16 | End: 2019-08-21 | Stop reason: HOSPADM

## 2019-08-16 RX ADMIN — GABAPENTIN 300 MG: 300 CAPSULE ORAL at 20:39

## 2019-08-16 RX ADMIN — SODIUM BICARBONATE 325 MG: 325 TABLET ORAL at 12:18

## 2019-08-16 RX ADMIN — PREGABALIN 300 MG: 150 CAPSULE ORAL at 06:26

## 2019-08-16 RX ADMIN — ZIPRASIDONE HYDROCHLORIDE 80 MG: 80 CAPSULE ORAL at 20:41

## 2019-08-16 RX ADMIN — INSULIN HUMAN 3 UNITS: 100 INJECTION, SOLUTION PARENTERAL at 00:48

## 2019-08-16 RX ADMIN — PREGABALIN 300 MG: 150 CAPSULE ORAL at 17:07

## 2019-08-16 RX ADMIN — FAMOTIDINE 40 MG: 20 TABLET ORAL at 06:29

## 2019-08-16 RX ADMIN — BUSPIRONE HYDROCHLORIDE 5 MG: 5 TABLET ORAL at 06:24

## 2019-08-16 RX ADMIN — TRAMADOL HYDROCHLORIDE 50 MG: 50 TABLET, FILM COATED ORAL at 14:31

## 2019-08-16 RX ADMIN — ONDANSETRON 4 MG: 2 INJECTION INTRAMUSCULAR; INTRAVENOUS at 09:32

## 2019-08-16 RX ADMIN — INSULIN HUMAN 2 UNITS: 100 INJECTION, SOLUTION PARENTERAL at 12:23

## 2019-08-16 RX ADMIN — SODIUM BICARBONATE 325 MG: 325 TABLET ORAL at 06:31

## 2019-08-16 RX ADMIN — BUSPIRONE HYDROCHLORIDE 5 MG: 5 TABLET ORAL at 17:08

## 2019-08-16 RX ADMIN — MYCOPHENOLATE MOFETIL 500 MG: 250 CAPSULE ORAL at 06:31

## 2019-08-16 RX ADMIN — METHYLPREDNISOLONE SODIUM SUCCINATE 40 MG: 40 INJECTION, POWDER, FOR SOLUTION INTRAMUSCULAR; INTRAVENOUS at 13:58

## 2019-08-16 RX ADMIN — IPRATROPIUM BROMIDE AND ALBUTEROL SULFATE 3 ML: .5; 3 SOLUTION RESPIRATORY (INHALATION) at 06:35

## 2019-08-16 RX ADMIN — FUROSEMIDE 80 MG: 40 TABLET ORAL at 06:28

## 2019-08-16 RX ADMIN — METHYLPREDNISOLONE SODIUM SUCCINATE 40 MG: 40 INJECTION, POWDER, FOR SOLUTION INTRAMUSCULAR; INTRAVENOUS at 08:24

## 2019-08-16 RX ADMIN — INSULIN GLARGINE 10 UNITS: 100 INJECTION, SOLUTION SUBCUTANEOUS at 20:38

## 2019-08-16 RX ADMIN — GABAPENTIN 300 MG: 300 CAPSULE ORAL at 12:18

## 2019-08-16 RX ADMIN — METHOCARBAMOL TABLETS 750 MG: 750 TABLET, COATED ORAL at 06:27

## 2019-08-16 RX ADMIN — INSULIN HUMAN 3 UNITS: 100 INJECTION, SOLUTION PARENTERAL at 20:34

## 2019-08-16 RX ADMIN — ONDANSETRON 4 MG: 4 TABLET, ORALLY DISINTEGRATING ORAL at 00:51

## 2019-08-16 RX ADMIN — ASPIRIN 81 MG: 81 TABLET, COATED ORAL at 06:25

## 2019-08-16 RX ADMIN — SODIUM BICARBONATE 325 MG: 325 TABLET ORAL at 17:07

## 2019-08-16 RX ADMIN — TRAMADOL HYDROCHLORIDE 50 MG: 50 TABLET, FILM COATED ORAL at 20:40

## 2019-08-16 RX ADMIN — AMOXICILLIN AND CLAVULANATE POTASSIUM 1 TABLET: 875; 125 TABLET, FILM COATED ORAL at 06:25

## 2019-08-16 RX ADMIN — ONDANSETRON 4 MG: 2 INJECTION INTRAMUSCULAR; INTRAVENOUS at 14:32

## 2019-08-16 RX ADMIN — METHYLPREDNISOLONE SODIUM SUCCINATE 40 MG: 40 INJECTION, POWDER, FOR SOLUTION INTRAMUSCULAR; INTRAVENOUS at 00:51

## 2019-08-16 RX ADMIN — MYCOPHENOLATE MOFETIL 500 MG: 250 CAPSULE ORAL at 17:07

## 2019-08-16 RX ADMIN — ZIPRASIDONE HYDROCHLORIDE 80 MG: 80 CAPSULE ORAL at 06:32

## 2019-08-16 RX ADMIN — TRAMADOL HYDROCHLORIDE 50 MG: 50 TABLET, FILM COATED ORAL at 08:31

## 2019-08-16 RX ADMIN — GABAPENTIN 300 MG: 300 CAPSULE ORAL at 08:24

## 2019-08-16 RX ADMIN — LEVOTHYROXINE SODIUM 75 MCG: 75 TABLET ORAL at 06:26

## 2019-08-16 RX ADMIN — INSULIN HUMAN 3 UNITS: 100 INJECTION, SOLUTION PARENTERAL at 17:13

## 2019-08-16 RX ADMIN — IPRATROPIUM BROMIDE AND ALBUTEROL SULFATE 3 ML: .5; 3 SOLUTION RESPIRATORY (INHALATION) at 10:54

## 2019-08-16 RX ADMIN — SENNOSIDES, DOCUSATE SODIUM 2 TABLET: 50; 8.6 TABLET, FILM COATED ORAL at 17:07

## 2019-08-16 RX ADMIN — AMOXICILLIN AND CLAVULANATE POTASSIUM 1 TABLET: 875; 125 TABLET, FILM COATED ORAL at 17:07

## 2019-08-16 RX ADMIN — PREDNISONE 40 MG: 20 TABLET ORAL at 15:46

## 2019-08-16 RX ADMIN — METHOCARBAMOL TABLETS 750 MG: 750 TABLET, COATED ORAL at 17:04

## 2019-08-16 RX ADMIN — IPRATROPIUM BROMIDE AND ALBUTEROL SULFATE 3 ML: .5; 3 SOLUTION RESPIRATORY (INHALATION) at 15:03

## 2019-08-16 RX ADMIN — GABAPENTIN 300 MG: 300 CAPSULE ORAL at 17:07

## 2019-08-16 RX ADMIN — TRAZODONE HYDROCHLORIDE 100 MG: 50 TABLET ORAL at 20:40

## 2019-08-16 RX ADMIN — INSULIN HUMAN 2 UNITS: 100 INJECTION, SOLUTION PARENTERAL at 06:22

## 2019-08-16 RX ADMIN — IPRATROPIUM BROMIDE AND ALBUTEROL SULFATE 3 ML: .5; 3 SOLUTION RESPIRATORY (INHALATION) at 20:30

## 2019-08-16 RX ADMIN — METHOCARBAMOL TABLETS 750 MG: 750 TABLET, COATED ORAL at 12:18

## 2019-08-16 RX ADMIN — POLYETHYLENE GLYCOL 3350 1 PACKET: 17 POWDER, FOR SOLUTION ORAL at 20:40

## 2019-08-16 RX ADMIN — TRAMADOL HYDROCHLORIDE 50 MG: 50 TABLET, FILM COATED ORAL at 00:51

## 2019-08-16 RX ADMIN — SENNOSIDES, DOCUSATE SODIUM 2 TABLET: 50; 8.6 TABLET, FILM COATED ORAL at 06:26

## 2019-08-16 RX ADMIN — PROMETHAZINE HYDROCHLORIDE 25 MG: 25 TABLET ORAL at 20:40

## 2019-08-16 RX ADMIN — FLUOXETINE HYDROCHLORIDE 20 MG: 20 CAPSULE ORAL at 06:28

## 2019-08-16 ASSESSMENT — ENCOUNTER SYMPTOMS
SPUTUM PRODUCTION: 1
CHILLS: 0
SHORTNESS OF BREATH: 1
VOMITING: 0
COUGH: 1
WHEEZING: 1
NAUSEA: 0
FEVER: 0
ABDOMINAL PAIN: 0

## 2019-08-16 ASSESSMENT — COGNITIVE AND FUNCTIONAL STATUS - GENERAL
SUGGESTED CMS G CODE MODIFIER MOBILITY: CI
MOBILITY SCORE: 23
CLIMB 3 TO 5 STEPS WITH RAILING: A LITTLE

## 2019-08-16 ASSESSMENT — GAIT ASSESSMENTS
GAIT LEVEL OF ASSIST: CONTACT GUARD ASSIST
ASSISTIVE DEVICE: FRONT WHEEL WALKER
DISTANCE (FEET): 40

## 2019-08-16 NOTE — THERAPY
"Physical Therapy Evaluation completed.   Bed Mobility:  Supine to Sit: Supervised  Transfers: Sit to Stand: Supervised  Gait: Level Of Assist: Contact Guard Assist with Front-Wheel Walker       Plan of Care: Patient with no further skilled PT needs in the acute care setting at this time  Discharge Recommendations: Equipment: No Equipment Needed. Post-acute therapy Currently anticipate no further skilled therapy needs once patient is discharged from the inpatient setting.  Pt presents asthma exacerbation, close to functional baseline today. Pt was supervised for OOB, transfers and ambulation but lacked endurance due to d/c feeling SOB. Pulse ox was stable on RA. Pt appears anxious, does better when slows down and takes her time. Pt has assist from family at home as needed. No further inpt PT needs. Patient will not be actively followed for physical therapy services at this time, however may be seen if requested by physician for 1 more visit within 30 days to address any discharge or equipment needs    See \"Rehab Therapy-Acute\" Patient Summary Report for complete documentation.     "

## 2019-08-16 NOTE — DIETARY
NUTRITION SERVICES: BMI - Pt with BMI >40 (=Body mass index is 47.47 kg/m².), morbid obesity. Weight loss counseling not appropriate in acute care setting.     RECOMMEND - Referral to outpatient nutrition services for weight management after D/C.

## 2019-08-16 NOTE — WOUND TEAM
Wound consult placed for evaluation of scabbed areas breast and abdomen.  Patient seen at outpatient wound clinic 8/13/19 and was instructed to wear cotton clothing, no picking and protect with foam dressings.  TC staff RN and discussed and she reports that is what patient has told her.  Staff RN is happy to provide patient with whatever she needs, probable adhesive foam.

## 2019-08-16 NOTE — PROGRESS NOTES
Assumed care of pt. Bedside report completed with CDU RN. Pt alert and oriented. Pt resting comfortably in bed. Call light within reach. Bed alarm on. Bed low and locked. Offered needs.

## 2019-08-16 NOTE — CARE PLAN
Assess for signs and symptoms of infection. Monitor vital signs and lab values. Implement standard precautions and hand washing before and after pt contact.

## 2019-08-16 NOTE — CARE PLAN
Fall precautions in place. Treaded socks on pt. Appropriate signs on doorway. IV pole on same side as bathroom. Bedrails up. Bed in lowest position and locked.  Call light and phone within reach. Patient educated on importance of calling nurses before getting out of bed, verbalizes understanding.

## 2019-08-16 NOTE — PROGRESS NOTES
2 RN skin assessment upon admission  Patient had small scab to right elbow.  Patient had one ulcer to RLQ abd  Patient had 4 ulcers on left breast.  Ulcers covered with foam dressing, all clean and intact.   Rash to back.  All other skin intact.

## 2019-08-16 NOTE — PROGRESS NOTES
Patient transferred to tele [id number] with RN and CNA with tele monitor.  Handoff given to tele 7 RN and CNA.

## 2019-08-17 PROBLEM — H92.02 LEFT EAR PAIN: Status: ACTIVE | Noted: 2019-08-17

## 2019-08-17 LAB
GLUCOSE BLD-MCNC: 135 MG/DL (ref 65–99)
GLUCOSE BLD-MCNC: 142 MG/DL (ref 65–99)
GLUCOSE BLD-MCNC: 161 MG/DL (ref 65–99)
GLUCOSE BLD-MCNC: 210 MG/DL (ref 65–99)
GLUCOSE BLD-MCNC: 268 MG/DL (ref 65–99)

## 2019-08-17 PROCEDURE — 700111 HCHG RX REV CODE 636 W/ 250 OVERRIDE (IP): Performed by: HOSPITALIST

## 2019-08-17 PROCEDURE — 82962 GLUCOSE BLOOD TEST: CPT | Mod: 91

## 2019-08-17 PROCEDURE — 700102 HCHG RX REV CODE 250 W/ 637 OVERRIDE(OP): Performed by: INTERNAL MEDICINE

## 2019-08-17 PROCEDURE — 94760 N-INVAS EAR/PLS OXIMETRY 1: CPT

## 2019-08-17 PROCEDURE — 700111 HCHG RX REV CODE 636 W/ 250 OVERRIDE (IP): Performed by: INTERNAL MEDICINE

## 2019-08-17 PROCEDURE — 700102 HCHG RX REV CODE 250 W/ 637 OVERRIDE(OP): Performed by: NURSE PRACTITIONER

## 2019-08-17 PROCEDURE — 700101 HCHG RX REV CODE 250: Performed by: INTERNAL MEDICINE

## 2019-08-17 PROCEDURE — A9270 NON-COVERED ITEM OR SERVICE: HCPCS | Performed by: NURSE PRACTITIONER

## 2019-08-17 PROCEDURE — 770006 HCHG ROOM/CARE - MED/SURG/GYN SEMI*

## 2019-08-17 PROCEDURE — 99232 SBSQ HOSP IP/OBS MODERATE 35: CPT | Performed by: INTERNAL MEDICINE

## 2019-08-17 PROCEDURE — A9270 NON-COVERED ITEM OR SERVICE: HCPCS | Performed by: HOSPITALIST

## 2019-08-17 PROCEDURE — 94640 AIRWAY INHALATION TREATMENT: CPT

## 2019-08-17 PROCEDURE — A9270 NON-COVERED ITEM OR SERVICE: HCPCS | Performed by: INTERNAL MEDICINE

## 2019-08-17 PROCEDURE — 700102 HCHG RX REV CODE 250 W/ 637 OVERRIDE(OP): Performed by: HOSPITALIST

## 2019-08-17 RX ORDER — IPRATROPIUM BROMIDE AND ALBUTEROL SULFATE 2.5; .5 MG/3ML; MG/3ML
3 SOLUTION RESPIRATORY (INHALATION)
Status: DISCONTINUED | OUTPATIENT
Start: 2019-08-17 | End: 2019-08-18 | Stop reason: ALTCHOICE

## 2019-08-17 RX ADMIN — AMOXICILLIN AND CLAVULANATE POTASSIUM 1 TABLET: 875; 125 TABLET, FILM COATED ORAL at 17:24

## 2019-08-17 RX ADMIN — SODIUM BICARBONATE 325 MG: 325 TABLET ORAL at 12:22

## 2019-08-17 RX ADMIN — INSULIN HUMAN 2 UNITS: 100 INJECTION, SOLUTION PARENTERAL at 00:32

## 2019-08-17 RX ADMIN — FLUOXETINE HYDROCHLORIDE 20 MG: 20 CAPSULE ORAL at 05:56

## 2019-08-17 RX ADMIN — METHOCARBAMOL TABLETS 750 MG: 750 TABLET, COATED ORAL at 12:22

## 2019-08-17 RX ADMIN — SENNOSIDES, DOCUSATE SODIUM 2 TABLET: 50; 8.6 TABLET, FILM COATED ORAL at 17:24

## 2019-08-17 RX ADMIN — TRAMADOL HYDROCHLORIDE 50 MG: 50 TABLET, FILM COATED ORAL at 05:53

## 2019-08-17 RX ADMIN — PREGABALIN 300 MG: 150 CAPSULE ORAL at 05:55

## 2019-08-17 RX ADMIN — BUSPIRONE HYDROCHLORIDE 5 MG: 5 TABLET ORAL at 17:23

## 2019-08-17 RX ADMIN — INSULIN HUMAN 3 UNITS: 100 INJECTION, SOLUTION PARENTERAL at 12:26

## 2019-08-17 RX ADMIN — MYCOPHENOLATE MOFETIL 500 MG: 250 CAPSULE ORAL at 17:24

## 2019-08-17 RX ADMIN — ASPIRIN 81 MG: 81 TABLET, COATED ORAL at 05:55

## 2019-08-17 RX ADMIN — MYCOPHENOLATE MOFETIL 500 MG: 250 CAPSULE ORAL at 05:47

## 2019-08-17 RX ADMIN — PROCHLORPERAZINE EDISYLATE 10 MG: 5 INJECTION INTRAMUSCULAR; INTRAVENOUS at 08:48

## 2019-08-17 RX ADMIN — METHOCARBAMOL TABLETS 750 MG: 750 TABLET, COATED ORAL at 17:23

## 2019-08-17 RX ADMIN — AMOXICILLIN AND CLAVULANATE POTASSIUM 1 TABLET: 875; 125 TABLET, FILM COATED ORAL at 05:47

## 2019-08-17 RX ADMIN — PREGABALIN 300 MG: 150 CAPSULE ORAL at 17:24

## 2019-08-17 RX ADMIN — IPRATROPIUM BROMIDE AND ALBUTEROL SULFATE 3 ML: .5; 3 SOLUTION RESPIRATORY (INHALATION) at 06:48

## 2019-08-17 RX ADMIN — SENNOSIDES, DOCUSATE SODIUM 2 TABLET: 50; 8.6 TABLET, FILM COATED ORAL at 05:54

## 2019-08-17 RX ADMIN — TRAZODONE HYDROCHLORIDE 100 MG: 50 TABLET ORAL at 21:11

## 2019-08-17 RX ADMIN — PROMETHAZINE HYDROCHLORIDE 25 MG: 25 TABLET ORAL at 18:36

## 2019-08-17 RX ADMIN — BUSPIRONE HYDROCHLORIDE 5 MG: 5 TABLET ORAL at 05:49

## 2019-08-17 RX ADMIN — IPRATROPIUM BROMIDE AND ALBUTEROL SULFATE 3 ML: .5; 3 SOLUTION RESPIRATORY (INHALATION) at 11:12

## 2019-08-17 RX ADMIN — FUROSEMIDE 80 MG: 40 TABLET ORAL at 05:48

## 2019-08-17 RX ADMIN — PREDNISONE 40 MG: 20 TABLET ORAL at 05:56

## 2019-08-17 RX ADMIN — LEVOTHYROXINE SODIUM 75 MCG: 75 TABLET ORAL at 05:55

## 2019-08-17 RX ADMIN — TRAMADOL HYDROCHLORIDE 50 MG: 50 TABLET, FILM COATED ORAL at 12:22

## 2019-08-17 RX ADMIN — TRAMADOL HYDROCHLORIDE 50 MG: 50 TABLET, FILM COATED ORAL at 18:36

## 2019-08-17 RX ADMIN — INSULIN GLARGINE 10 UNITS: 100 INJECTION, SOLUTION SUBCUTANEOUS at 17:30

## 2019-08-17 RX ADMIN — ZIPRASIDONE HYDROCHLORIDE 80 MG: 80 CAPSULE ORAL at 17:23

## 2019-08-17 RX ADMIN — ONDANSETRON 4 MG: 2 INJECTION INTRAMUSCULAR; INTRAVENOUS at 23:22

## 2019-08-17 RX ADMIN — FAMOTIDINE 40 MG: 20 TABLET ORAL at 05:55

## 2019-08-17 RX ADMIN — ZIPRASIDONE HYDROCHLORIDE 80 MG: 80 CAPSULE ORAL at 05:54

## 2019-08-17 RX ADMIN — GABAPENTIN 300 MG: 300 CAPSULE ORAL at 17:24

## 2019-08-17 RX ADMIN — SODIUM BICARBONATE 325 MG: 325 TABLET ORAL at 05:56

## 2019-08-17 RX ADMIN — IPRATROPIUM BROMIDE AND ALBUTEROL SULFATE 3 ML: .5; 3 SOLUTION RESPIRATORY (INHALATION) at 14:32

## 2019-08-17 RX ADMIN — GABAPENTIN 300 MG: 300 CAPSULE ORAL at 12:22

## 2019-08-17 RX ADMIN — SODIUM BICARBONATE 650 MG: 325 TABLET ORAL at 17:23

## 2019-08-17 RX ADMIN — GABAPENTIN 300 MG: 300 CAPSULE ORAL at 08:38

## 2019-08-17 RX ADMIN — METHOCARBAMOL TABLETS 750 MG: 750 TABLET, COATED ORAL at 05:53

## 2019-08-17 RX ADMIN — GABAPENTIN 300 MG: 300 CAPSULE ORAL at 21:11

## 2019-08-17 RX ADMIN — PROCHLORPERAZINE EDISYLATE 10 MG: 5 INJECTION INTRAMUSCULAR; INTRAVENOUS at 13:28

## 2019-08-17 RX ADMIN — IPRATROPIUM BROMIDE AND ALBUTEROL SULFATE 3 ML: .5; 3 SOLUTION RESPIRATORY (INHALATION) at 18:30

## 2019-08-17 RX ADMIN — INSULIN HUMAN 1 UNITS: 100 INJECTION, SOLUTION PARENTERAL at 08:42

## 2019-08-17 ASSESSMENT — ENCOUNTER SYMPTOMS
COUGH: 1
SHORTNESS OF BREATH: 1
VOMITING: 0
NAUSEA: 0
ABDOMINAL PAIN: 0
WHEEZING: 1
SPUTUM PRODUCTION: 1
FEVER: 0

## 2019-08-17 NOTE — PROGRESS NOTES
[facility] Medicine Daily Progress Note    Date of Service  8/16/2019    Chief Complaint  28 y.o. female admitted 8/14/2019 with asthma  Exacerbation in the setting of very complex past medical history    Hospital Course    see below      Interval Problem Update  Asthma exacerbation-feels quite wheezy still, but feels the medications are helping. Claims that her productive cough has changed to being colored green now.    Consultants/Specialty  NONE    Code Status  FCFC    Disposition  Transfer to medical    Review of Systems  Review of Systems   Constitutional: Positive for malaise/fatigue. Negative for chills and fever.   Respiratory: Positive for cough, sputum production, shortness of breath and wheezing.    Gastrointestinal: Negative for abdominal pain, nausea and vomiting.   All other systems reviewed and are negative.       Physical Exam  Temp:  [36.6 °C (97.8 °F)-37.9 °C (100.3 °F)] 37.3 °C (99.1 °F)  Pulse:  [] 100  Resp:  [18-20] 18  BP: (112-136)/(61-80) 136/77  SpO2:  [91 %-98 %] 91 %    Physical Exam   Constitutional: She is oriented to person, place, and time. She appears well-developed and well-nourished. No distress.   Obese  Body mass index is 47.47 kg/m².     HENT:   Head: Normocephalic and atraumatic.   Mouth/Throat: No oropharyngeal exudate.   Eyes: Pupils are equal, round, and reactive to light. Right eye exhibits no discharge. Left eye exhibits no discharge.   Neck: Normal range of motion. Neck supple.   Cardiovascular: Normal rate, regular rhythm, normal heart sounds and intact distal pulses.   No murmur heard.  Pulmonary/Chest: Effort normal. No stridor. No respiratory distress. She has wheezes (mild).   Speaking in full sentences   Abdominal: Soft. Bowel sounds are normal. She exhibits no distension. There is no tenderness.   Musculoskeletal: Normal range of motion. She exhibits no edema or tenderness.   Neurological: She is alert and oriented to person, place, and time. No cranial nerve  deficit.   Skin: Skin is warm and dry. No rash noted.   Psychiatric: She has a normal mood and affect.   Nursing note and vitals reviewed.      Fluids    Intake/Output Summary (Last 24 hours) at 8/16/2019 1845  Last data filed at 8/16/2019 1323  Gross per 24 hour   Intake 100 ml   Output --   Net 100 ml       Laboratory  Recent Labs     08/14/19  1428 08/15/19  0543   WBC 6.1 6.8   RBC 4.28 4.13*   HEMOGLOBIN 13.6 12.9   HEMATOCRIT 40.2 39.1   MCV 93.9 94.7   MCH 31.8 31.2   MCHC 33.8 33.0*   RDW 48.7 49.0   PLATELETCT 197 165   MPV 11.1 11.1     Recent Labs     08/14/19  1428 08/15/19  0543   SODIUM 137 134*   POTASSIUM 4.1 4.0   CHLORIDE 107 106   CO2 17* 15*   GLUCOSE 199* 237*   BUN 12 9   CREATININE 0.67 0.57   CALCIUM 10.0 9.6     Recent Labs     08/14/19  1428   APTT 26.2   INR 0.94               Imaging  DX-CHEST-PORTABLE (1 VIEW)   Final Result      No acute cardiopulmonary abnormality.           Assessment/Plan  * Asthma with exacerbation- (present on admission)  Assessment & Plan  Acute exacerbation in patient who is immunocompromised and multiple comorbid conditions, improving nicely, mild wheezing noted today  No evidence of acute infection except skin wounds noted  Switch to oral steroids, prednisone 40 mg daily  -consider ordering home nebulizer  -BD's as outlined below  Duo nebs / Xopenex   02 to keep sats 88-92%  Needs outpatient PFTS to be done       Breast wound- (present on admission)  Assessment & Plan  She has multiple wounds over breast and trunk seem self inflicted   Will order for empiric bactrim for now given immunosupression   Wound care consult     Controlled type 2 diabetes mellitus without complication, without long-term current use of insulin (HCC)- (present on admission)  Assessment & Plan  SSI ordered  Hold home medications   accu checks   -monitor sugars closely while on steroids for respiratory issues    Optic neuritis- (present on admission)  Assessment & Plan  On celcept and  chronic steroids     Chronic respiratory failure with hypoxia, on home oxygen therapy (HCC)- (present on admission)  Assessment & Plan  At baseline home 02 demand   Does have respiratory distress probably needs ambulatory study prior to d/c  PT to evaluate     TONYA (obstructive sleep apnea)- (present on admission)  Assessment & Plan  Needs follow up for CPAP machine     Acquired hypothyroidism- (present on admission)  Assessment & Plan  Resume home levothyroxine   TSH within normal limits     GERD (gastroesophageal reflux disease)- (present on admission)  Assessment & Plan  Resume home pepcid    Peripheral neuropathy (CMS-[facility])- (present on admission)  Assessment & Plan  Resume home lyrica and gabapentin     Schizophrenia ([facility])- (present on admission)  Assessment & Plan  Hx of resume home medications     PCOS (polycystic ovarian syndrome)- (present on admission)  Assessment & Plan  Hx of     Progressive focal motor weakness- (present on admission)  Assessment & Plan  Hx of  PT/OT evaluations pending    Anxiety- (present on admission)  Assessment & Plan  Resume home buspar and prozac       VTE prophylaxis: SCD's

## 2019-08-17 NOTE — CARE PLAN
Respiratory Therapy Update    Interdisciplinary Plan of Care-Goals (Indications)  Bronchodilator Indications: Physical Exam / Hyperinflation / Wheezing (bronchospasm) (08/16/19 2031)  Interdisciplinary Plan of Care-Outcomes   Bronchodilator Outcome: Patient at Stable Baseline (08/16/19 2031)    #SVN Performed: Yes (08/16/19 2031)    Cough: Productive (08/16/19 2031)  Sputum Amount: Small (08/16/19 2031)  Sputum Color: Yellow;White (08/16/19 2031)  Sputum Consistency: Thick;Thin (08/16/19 2031)    FiO2%: 21 % (08/16/19 1504)  O2 (LPM): 0 (08/16/19 2031)  O2 Daily Delivery Respiratory : Room Air with O2 Available (08/16/19 2031)    Breath Sounds  Pre/Post Intervention: Pre Intervention Assessment (08/16/19 2031)  RUL Breath Sounds: Clear (08/16/19 2031)  RML Breath Sounds: Clear (08/16/19 2031)  RLL Breath Sounds: Diminished (08/16/19 2031)  MARY Breath Sounds: Clear (08/16/19 2031)  LLL Breath Sounds: Diminished (08/16/19 2031)      Events/Summary/Plan: Pt compliant with therapy. Duo QID. Pt takes albuterol at home.

## 2019-08-17 NOTE — PROGRESS NOTES
Bedside report received and pt care assumed.  Pt is A&Ox4, sitting up in bed, and denies any additional needs at this time. Bed in lowest position, pt educated on fall risk and verbalized understanding, call light within reach.

## 2019-08-17 NOTE — PROGRESS NOTES
[facility] Medicine Daily Progress Note    Date of Service  8/17/2019    Chief Complaint  28 y.o. female admitted 8/14/2019 with asthma  Exacerbation in the setting of very complex past medical history    Hospital Course    see below      Interval Problem Update  Asthma exacerbation-breathing is improving, she is struggling with clear the bases of her lungs well.    L ear pain-continues, no drainage, mostly at the base of the ear. No muffled hearing sounds.     Consultants/Specialty  NONE    Code Status  FCFC    Disposition  Transfer to medical    Review of Systems  Review of Systems   Constitutional: Positive for malaise/fatigue (improved today). Negative for fever.   HENT: Positive for ear pain. Tinnitus: L ear.    Respiratory: Positive for cough, sputum production, shortness of breath and wheezing.         All aspects improving   Gastrointestinal: Negative for abdominal pain, nausea and vomiting.   All other systems reviewed and are negative.       Physical Exam  Temp:  [36.1 °C (96.9 °F)-37.3 °C (99.1 °F)] 36.1 °C (96.9 °F)  Pulse:  [] 78  Resp:  [16-18] 16  BP: (121-136)/(60-89) 134/89  SpO2:  [90 %-98 %] 95 %    Physical Exam   Constitutional: She is oriented to person, place, and time. She appears well-developed and well-nourished. No distress.   Obese  Body mass index is 47.47 kg/m².     HENT:   Head: Normocephalic and atraumatic.   Mouth/Throat: No oropharyngeal exudate.   L ear appears normal  L mastoid area with mild TTP  No overlying skin changes  No asymmetry noted   Eyes: Pupils are equal, round, and reactive to light. Right eye exhibits no discharge. Left eye exhibits no discharge.   Neck: Normal range of motion. Neck supple.   Cardiovascular: Normal rate, regular rhythm, normal heart sounds and intact distal pulses.   No murmur heard.  Pulmonary/Chest: Effort normal. No stridor. No respiratory distress. She has wheezes (almost none observed today).   Speaking in full sentences   Abdominal: Soft.  Bowel sounds are normal. She exhibits no distension. There is no tenderness.   Musculoskeletal: Normal range of motion. She exhibits no edema or tenderness.   Neurological: She is alert and oriented to person, place, and time. No cranial nerve deficit.   Skin: Skin is warm and dry. No rash noted.   Psychiatric: She has a normal mood and affect.   Nursing note and vitals reviewed.      Fluids    Intake/Output Summary (Last 24 hours) at 8/17/2019 1149  Last data filed at 8/16/2019 1323  Gross per 24 hour   Intake 100 ml   Output --   Net 100 ml       Laboratory  Recent Labs     08/14/19  1428 08/15/19  0543   WBC 6.1 6.8   RBC 4.28 4.13*   HEMOGLOBIN 13.6 12.9   HEMATOCRIT 40.2 39.1   MCV 93.9 94.7   MCH 31.8 31.2   MCHC 33.8 33.0*   RDW 48.7 49.0   PLATELETCT 197 165   MPV 11.1 11.1     Recent Labs     08/14/19  1428 08/15/19  0543   SODIUM 137 134*   POTASSIUM 4.1 4.0   CHLORIDE 107 106   CO2 17* 15*   GLUCOSE 199* 237*   BUN 12 9   CREATININE 0.67 0.57   CALCIUM 10.0 9.6     Recent Labs     08/14/19  1428   APTT 26.2   INR 0.94               Imaging  DX-CHEST-PORTABLE (1 VIEW)   Final Result      No acute cardiopulmonary abnormality.           Assessment/Plan  * Asthma with exacerbation- (present on admission)  Assessment & Plan  Acute exacerbation in patient who is immunocompromised and multiple comorbid conditions, improving nicely,   -almost no wheezing appreciated today  No evidence of acute infection except skin wounds noted  Switch to oral steroids, prednisone 40 mg daily, tolerating well, day#2  -consider ordering home nebulizer  -BD's as outlined below  Duo nebs / Xopenex   02 to keep sats 88-92%  Needs outpatient PFTS to be done       Breast wound- (present on admission)  Assessment & Plan  She has multiple wounds over breast and trunk seem self inflicted   -on oral augmentin, appears to be tolerating well  Wound care consult     Controlled type 2 diabetes mellitus without complication, without long-term  current use of insulin (HCC)- (present on admission)  Assessment & Plan  SSI ordered  Hold home medications   accu checks   -monitor sugars closely while on steroids for respiratory issues  -with starting lantus 10 units daily, sugars have improved now    Left ear pain- (present on admission)  Assessment & Plan  -at the mastoid, ?recent inner ear tube placed  -on oral augmentin  -no overlying skin changes  -monitor closely, if does not improve, consider CT head to R/O mastoiditis  -no drainage from ear noted, no overlying skin changes    Optic neuritis- (present on admission)  Assessment & Plan  On celcept and chronic steroids     Chronic respiratory failure with hypoxia, on home oxygen therapy (HCC)- (present on admission)  Assessment & Plan  At baseline home 02 demand   Does have respiratory distress probably needs ambulatory study prior to d/c  PT to evaluate     TONYA (obstructive sleep apnea)- (present on admission)  Assessment & Plan  Needs follow up for CPAP machine     Acquired hypothyroidism- (present on admission)  Assessment & Plan  Resume home levothyroxine   TSH within normal limits     GERD (gastroesophageal reflux disease)- (present on admission)  Assessment & Plan  Resume home pepcid    Peripheral neuropathy (CMS-[facility])- (present on admission)  Assessment & Plan  Resume home lyrica and gabapentin     Schizophrenia ([facility])- (present on admission)  Assessment & Plan  Hx of resume home medications     PCOS (polycystic ovarian syndrome)- (present on admission)  Assessment & Plan  Hx of     Progressive focal motor weakness- (present on admission)  Assessment & Plan  Hx of  PT/OT evaluations pending    Anxiety- (present on admission)  Assessment & Plan  Resume home buspar and prozac       VTE prophylaxis: SCD's

## 2019-08-17 NOTE — FACE TO FACE
Face to Face Note  -  Durable Medical Equipment    Estevan Hogan M.D. - NPI: [id number]  I certify that this patient is under my care and that they had a durable medical equipment(DME)face to face encounter by myself that meets the physician DME face-to-face encounter requirements with this patient on:    Date of encounter:   Patient:                    MRN:                       YOB: 2020  Kristin Balderrama  [id number]  1990     The encounter with the patient was in whole, or in part, for the following medical condition, which is the primary reason for durable medical equipment:  Asthma    I certify that, based on my findings, the following durable medical equipment is medically necessary:  Nebulizer.    HOME O2 Saturation Measurements:(Values must be present for Home Oxygen orders)         ,     ,         My Clinical findings support the need for the above equipment due to:  Hypoxia    Supporting Symptoms: shortness of breath

## 2019-08-17 NOTE — PROGRESS NOTES
Bedside report received, pt care assumed, tele box on.  Pt is A&Ox4, resting in bed, and denies any additional needs at this time. Bed in lowest position and call light within reach.

## 2019-08-17 NOTE — CARE PLAN
Problem: Safety  Goal: Will remain free from injury  Outcome: PROGRESSING AS EXPECTED     Problem: Safety  Goal: Will remain free from falls  Outcome: PROGRESSING AS EXPECTED     Fall precautions in place. Treaded socks on pt. Appropriate signs on doorway. IV pole on same side as bathroom. Bedrails up. Bed in lowest position and locked.  Call light and phone within reach. Patient educated on importance of calling nurses before getting out of bed, verbalizes understanding.       Problem: Infection  Goal: Will remain free from infection  Outcome: PROGRESSING AS EXPECTED    Assess for signs and symptoms of infection. Monitor vital signs and lab values. Implement standard precautions and hand washing before and after pt contact.

## 2019-08-18 ENCOUNTER — APPOINTMENT (OUTPATIENT)
Dept: RADIOLOGY | Facility: MEDICAL CENTER | Age: 29
DRG: 202 | End: 2019-08-18
Attending: INTERNAL MEDICINE
Payer: MEDICARE

## 2019-08-18 LAB
GLUCOSE BLD-MCNC: 131 MG/DL (ref 65–99)
GLUCOSE BLD-MCNC: 152 MG/DL (ref 65–99)
GLUCOSE BLD-MCNC: 172 MG/DL (ref 65–99)
GLUCOSE BLD-MCNC: 85 MG/DL (ref 65–99)

## 2019-08-18 PROCEDURE — A9270 NON-COVERED ITEM OR SERVICE: HCPCS | Performed by: HOSPITALIST

## 2019-08-18 PROCEDURE — 700102 HCHG RX REV CODE 250 W/ 637 OVERRIDE(OP): Performed by: NURSE PRACTITIONER

## 2019-08-18 PROCEDURE — 700111 HCHG RX REV CODE 636 W/ 250 OVERRIDE (IP): Performed by: HOSPITALIST

## 2019-08-18 PROCEDURE — 94760 N-INVAS EAR/PLS OXIMETRY 1: CPT

## 2019-08-18 PROCEDURE — 94640 AIRWAY INHALATION TREATMENT: CPT

## 2019-08-18 PROCEDURE — 99232 SBSQ HOSP IP/OBS MODERATE 35: CPT | Performed by: INTERNAL MEDICINE

## 2019-08-18 PROCEDURE — 700102 HCHG RX REV CODE 250 W/ 637 OVERRIDE(OP): Performed by: HOSPITALIST

## 2019-08-18 PROCEDURE — A9270 NON-COVERED ITEM OR SERVICE: HCPCS | Performed by: NURSE PRACTITIONER

## 2019-08-18 PROCEDURE — 770006 HCHG ROOM/CARE - MED/SURG/GYN SEMI*

## 2019-08-18 PROCEDURE — 700111 HCHG RX REV CODE 636 W/ 250 OVERRIDE (IP): Performed by: INTERNAL MEDICINE

## 2019-08-18 PROCEDURE — 82962 GLUCOSE BLOOD TEST: CPT

## 2019-08-18 PROCEDURE — 700101 HCHG RX REV CODE 250: Performed by: INTERNAL MEDICINE

## 2019-08-18 PROCEDURE — 700102 HCHG RX REV CODE 250 W/ 637 OVERRIDE(OP): Performed by: INTERNAL MEDICINE

## 2019-08-18 PROCEDURE — A9270 NON-COVERED ITEM OR SERVICE: HCPCS | Performed by: INTERNAL MEDICINE

## 2019-08-18 PROCEDURE — 71046 X-RAY EXAM CHEST 2 VIEWS: CPT

## 2019-08-18 RX ORDER — DIPHENHYDRAMINE HCL 25 MG
25 TABLET ORAL ONCE
Status: COMPLETED | OUTPATIENT
Start: 2019-08-18 | End: 2019-08-18

## 2019-08-18 RX ADMIN — POLYETHYLENE GLYCOL 3350 1 PACKET: 17 POWDER, FOR SOLUTION ORAL at 14:04

## 2019-08-18 RX ADMIN — TRAMADOL HYDROCHLORIDE 50 MG: 50 TABLET, FILM COATED ORAL at 03:01

## 2019-08-18 RX ADMIN — SODIUM BICARBONATE 650 MG: 325 TABLET ORAL at 17:49

## 2019-08-18 RX ADMIN — FAMOTIDINE 40 MG: 20 TABLET ORAL at 05:45

## 2019-08-18 RX ADMIN — BUSPIRONE HYDROCHLORIDE 5 MG: 5 TABLET ORAL at 17:49

## 2019-08-18 RX ADMIN — METHOCARBAMOL TABLETS 750 MG: 750 TABLET, COATED ORAL at 12:08

## 2019-08-18 RX ADMIN — ZIPRASIDONE HYDROCHLORIDE 80 MG: 80 CAPSULE ORAL at 17:47

## 2019-08-18 RX ADMIN — SODIUM BICARBONATE 325 MG: 325 TABLET ORAL at 12:09

## 2019-08-18 RX ADMIN — MYCOPHENOLATE MOFETIL 500 MG: 250 CAPSULE ORAL at 17:48

## 2019-08-18 RX ADMIN — PREGABALIN 300 MG: 150 CAPSULE ORAL at 05:47

## 2019-08-18 RX ADMIN — ZIPRASIDONE HYDROCHLORIDE 80 MG: 80 CAPSULE ORAL at 05:50

## 2019-08-18 RX ADMIN — DIPHENHYDRAMINE HCL 25 MG: 25 TABLET ORAL at 20:27

## 2019-08-18 RX ADMIN — MYCOPHENOLATE MOFETIL 500 MG: 250 CAPSULE ORAL at 05:53

## 2019-08-18 RX ADMIN — FLUOXETINE HYDROCHLORIDE 20 MG: 20 CAPSULE ORAL at 05:47

## 2019-08-18 RX ADMIN — AMOXICILLIN AND CLAVULANATE POTASSIUM 1 TABLET: 875; 125 TABLET, FILM COATED ORAL at 17:49

## 2019-08-18 RX ADMIN — GABAPENTIN 300 MG: 300 CAPSULE ORAL at 20:27

## 2019-08-18 RX ADMIN — FUROSEMIDE 80 MG: 40 TABLET ORAL at 05:46

## 2019-08-18 RX ADMIN — BUSPIRONE HYDROCHLORIDE 5 MG: 5 TABLET ORAL at 05:47

## 2019-08-18 RX ADMIN — PROCHLORPERAZINE EDISYLATE 10 MG: 5 INJECTION INTRAMUSCULAR; INTRAVENOUS at 11:18

## 2019-08-18 RX ADMIN — PROCHLORPERAZINE EDISYLATE 10 MG: 5 INJECTION INTRAMUSCULAR; INTRAVENOUS at 18:08

## 2019-08-18 RX ADMIN — TRAZODONE HYDROCHLORIDE 100 MG: 50 TABLET ORAL at 20:27

## 2019-08-18 RX ADMIN — TRAMADOL HYDROCHLORIDE 50 MG: 50 TABLET, FILM COATED ORAL at 09:37

## 2019-08-18 RX ADMIN — TRAMADOL HYDROCHLORIDE 50 MG: 50 TABLET, FILM COATED ORAL at 23:09

## 2019-08-18 RX ADMIN — GABAPENTIN 300 MG: 300 CAPSULE ORAL at 08:37

## 2019-08-18 RX ADMIN — PROCHLORPERAZINE EDISYLATE 10 MG: 5 INJECTION INTRAMUSCULAR; INTRAVENOUS at 23:17

## 2019-08-18 RX ADMIN — TRAMADOL HYDROCHLORIDE 50 MG: 50 TABLET, FILM COATED ORAL at 16:15

## 2019-08-18 RX ADMIN — INSULIN GLARGINE 10 UNITS: 100 INJECTION, SOLUTION SUBCUTANEOUS at 17:55

## 2019-08-18 RX ADMIN — PREDNISONE 40 MG: 20 TABLET ORAL at 05:45

## 2019-08-18 RX ADMIN — LEVOTHYROXINE SODIUM 75 MCG: 75 TABLET ORAL at 05:47

## 2019-08-18 RX ADMIN — PROMETHAZINE HYDROCHLORIDE 25 MG: 25 TABLET ORAL at 05:46

## 2019-08-18 RX ADMIN — GABAPENTIN 300 MG: 300 CAPSULE ORAL at 12:08

## 2019-08-18 RX ADMIN — IPRATROPIUM BROMIDE AND ALBUTEROL SULFATE 3 ML: .5; 3 SOLUTION RESPIRATORY (INHALATION) at 07:23

## 2019-08-18 RX ADMIN — INSULIN HUMAN 1 UNITS: 100 INJECTION, SOLUTION PARENTERAL at 12:12

## 2019-08-18 RX ADMIN — METHOCARBAMOL TABLETS 750 MG: 750 TABLET, COATED ORAL at 17:49

## 2019-08-18 RX ADMIN — IPRATROPIUM BROMIDE AND ALBUTEROL SULFATE 3 ML: .5; 3 SOLUTION RESPIRATORY (INHALATION) at 11:01

## 2019-08-18 RX ADMIN — METHOCARBAMOL TABLETS 750 MG: 750 TABLET, COATED ORAL at 05:44

## 2019-08-18 RX ADMIN — GABAPENTIN 300 MG: 300 CAPSULE ORAL at 17:48

## 2019-08-18 RX ADMIN — AMOXICILLIN AND CLAVULANATE POTASSIUM 1 TABLET: 875; 125 TABLET, FILM COATED ORAL at 05:46

## 2019-08-18 RX ADMIN — ASPIRIN 81 MG: 81 TABLET, COATED ORAL at 05:46

## 2019-08-18 RX ADMIN — PREGABALIN 300 MG: 150 CAPSULE ORAL at 17:49

## 2019-08-18 RX ADMIN — SENNOSIDES, DOCUSATE SODIUM 2 TABLET: 50; 8.6 TABLET, FILM COATED ORAL at 05:47

## 2019-08-18 RX ADMIN — INSULIN HUMAN 1 UNITS: 100 INJECTION, SOLUTION PARENTERAL at 08:41

## 2019-08-18 RX ADMIN — SENNOSIDES, DOCUSATE SODIUM 2 TABLET: 50; 8.6 TABLET, FILM COATED ORAL at 17:49

## 2019-08-18 RX ADMIN — SODIUM BICARBONATE 325 MG: 325 TABLET ORAL at 05:50

## 2019-08-18 ASSESSMENT — COGNITIVE AND FUNCTIONAL STATUS - GENERAL
HELP NEEDED FOR BATHING: A LITTLE
DAILY ACTIVITIY SCORE: 22
SUGGESTED CMS G CODE MODIFIER DAILY ACTIVITY: CJ
CLIMB 3 TO 5 STEPS WITH RAILING: A LITTLE
DRESSING REGULAR LOWER BODY CLOTHING: A LITTLE
MOBILITY SCORE: 21
SUGGESTED CMS G CODE MODIFIER MOBILITY: CJ
WALKING IN HOSPITAL ROOM: A LITTLE
STANDING UP FROM CHAIR USING ARMS: A LITTLE

## 2019-08-18 ASSESSMENT — ENCOUNTER SYMPTOMS
COUGH: 1
SHORTNESS OF BREATH: 1
NAUSEA: 0
SPUTUM PRODUCTION: 1
VOMITING: 0
WHEEZING: 1
FEVER: 0
ABDOMINAL PAIN: 0

## 2019-08-18 NOTE — PROGRESS NOTES
Bedside report received and pt care assumed.  Pt is A&Ox4, resting in bed. Pt ambulated to the restroom and denies any additional needs at this time. Bed in lowest position, pt educated on fall risk and verbalized understanding, call light within reach.

## 2019-08-18 NOTE — CARE PLAN
Reviewed disease process, medications, tests and procedures. Answered patient and family questions.

## 2019-08-18 NOTE — PROGRESS NOTES
Safety precautions in place. Call light within reach. Bed is low and in locked position. Hourly rounding in place.

## 2019-08-18 NOTE — PROGRESS NOTES
[facility] Medicine Daily Progress Note    Date of Service  8/18/2019    Chief Complaint  28 y.o. female admitted 8/14/2019 with asthma  Exacerbation in the setting of very complex past medical history    Hospital Course    see below      Interval Problem Update  Asthma exacerbation-she feels she is improving. Clearing low lungs.     L ear pain-feels it is improved. No secretions, no changes in hearing today.     Consultants/Specialty  NONE    Code Status  FCFC    Disposition  Transfer to medical    Review of Systems  Review of Systems   Constitutional: Positive for malaise/fatigue (improved today). Negative for fever.   HENT: Positive for ear pain. Tinnitus: L ear.    Respiratory: Positive for cough, sputum production, shortness of breath and wheezing.         All aspects improving   Gastrointestinal: Negative for abdominal pain, nausea and vomiting.   All other systems reviewed and are negative.       Physical Exam  Temp:  [36.1 °C (96.9 °F)-36.9 °C (98.4 °F)] 36.1 °C (96.9 °F)  Pulse:  [75-93] 90  Resp:  [16-20] 16  BP: (131-140)/(79-86) 140/84  SpO2:  [93 %-98 %] 94 %    Physical Exam   Constitutional: She is oriented to person, place, and time. She appears well-developed and well-nourished. No distress.   Obese  Body mass index is 47.47 kg/m².     HENT:   Head: Normocephalic and atraumatic.   Mouth/Throat: No oropharyngeal exudate.   L ear appears normal  L mastoid area with mild TTP  No overlying skin changes  No asymmetry noted  Unchanged today   Eyes: Pupils are equal, round, and reactive to light. Right eye exhibits no discharge. Left eye exhibits no discharge.   Neck: Normal range of motion. Neck supple.   Cardiovascular: Normal rate, regular rhythm, normal heart sounds and intact distal pulses.   No murmur heard.  Pulmonary/Chest: Effort normal. No stridor. She has no wheezes (almost none observed today). She has no rales.   Speaking in full sentences  Improved aeration throughout today   Abdominal: Soft.  Bowel sounds are normal. There is no tenderness.   Musculoskeletal: Normal range of motion. She exhibits no tenderness.   Neurological: She is alert and oriented to person, place, and time. No cranial nerve deficit.   Skin: Skin is warm and dry. No rash noted.   Psychiatric: She has a normal mood and affect.   Nursing note and vitals reviewed.      Fluids    Intake/Output Summary (Last 24 hours) at 8/18/2019 1316  Last data filed at 8/18/2019 0900  Gross per 24 hour   Intake 300 ml   Output --   Net 300 ml       Laboratory                        Imaging  DX-CHEST-PORTABLE (1 VIEW)   Final Result      No acute cardiopulmonary abnormality.           Assessment/Plan  * Asthma with exacerbation- (present on admission)  Assessment & Plan  Acute exacerbation in patient who is immunocompromised and multiple comorbid conditions, no wheezing appreciated  No evidence of acute infection except skin wounds noted  Continue  prednisone 40 mg daily, tolerating well, day#3  -consider ordering home nebulizer  -BD's as outlined below  Duo nebs / Xopenex   02 to keep sats 88-92%  Needs outpatient PFTS to be done   -Pa lateral CXR today      Breast wound- (present on admission)  Assessment & Plan  She has multiple wounds over breast and trunk seem self inflicted   -on oral augmentin, appears to be tolerating well  Wound care consult     Controlled type 2 diabetes mellitus without complication, without long-term current use of insulin (HCC)- (present on admission)  Assessment & Plan  SSI ordered  Hold home medications   accu checks   -monitor sugars closely while on steroids for respiratory issues  -with starting lantus 10 units daily, sugars have improved now  -remains decently controlled at this time    Left ear pain- (present on admission)  Assessment & Plan  -at the mastoid, ?recent inner ear tube placed  -on oral augmentin  -no overlying skin changes  -monitor closely, if does not improve, consider CT head to R/O mastoiditis  -no  drainage from ear noted, no overlying skin changes  -clinically improved today    Optic neuritis- (present on admission)  Assessment & Plan  On celcept and chronic steroids     Chronic respiratory failure with hypoxia, on home oxygen therapy (HCC)- (present on admission)  Assessment & Plan  At baseline home 02 demand   Does have respiratory distress probably needs ambulatory study prior to d/c  PT to evaluate     TONYA (obstructive sleep apnea)- (present on admission)  Assessment & Plan  Needs follow up for CPAP machine     Acquired hypothyroidism- (present on admission)  Assessment & Plan  Resume home levothyroxine   TSH within normal limits     GERD (gastroesophageal reflux disease)- (present on admission)  Assessment & Plan  Resume home pepcid    Peripheral neuropathy (CMS-HCC)- (present on admission)  Assessment & Plan  Resume home lyrica and gabapentin     Schizophrenia ([facility])- (present on admission)  Assessment & Plan  Hx of resume home medications     PCOS (polycystic ovarian syndrome)- (present on admission)  Assessment & Plan  Hx of     Progressive focal motor weakness- (present on admission)  Assessment & Plan  Hx of  PT/OT evaluations pending    Anxiety- (present on admission)  Assessment & Plan  Resume home buspar and prozac       VTE prophylaxis: SCD's

## 2019-08-18 NOTE — DISCHARGE PLANNING
Received Choice form at 1110  Agency/Facility Name: [facility]   Referral sent per Choice form at 1113

## 2019-08-19 LAB
GLUCOSE BLD-MCNC: 116 MG/DL (ref 65–99)
GLUCOSE BLD-MCNC: 143 MG/DL (ref 65–99)
GLUCOSE BLD-MCNC: 158 MG/DL (ref 65–99)

## 2019-08-19 PROCEDURE — 94640 AIRWAY INHALATION TREATMENT: CPT

## 2019-08-19 PROCEDURE — A9270 NON-COVERED ITEM OR SERVICE: HCPCS | Performed by: INTERNAL MEDICINE

## 2019-08-19 PROCEDURE — A9270 NON-COVERED ITEM OR SERVICE: HCPCS | Performed by: NURSE PRACTITIONER

## 2019-08-19 PROCEDURE — 99232 SBSQ HOSP IP/OBS MODERATE 35: CPT | Performed by: INTERNAL MEDICINE

## 2019-08-19 PROCEDURE — 700102 HCHG RX REV CODE 250 W/ 637 OVERRIDE(OP): Performed by: HOSPITALIST

## 2019-08-19 PROCEDURE — 770006 HCHG ROOM/CARE - MED/SURG/GYN SEMI*

## 2019-08-19 PROCEDURE — A9270 NON-COVERED ITEM OR SERVICE: HCPCS | Performed by: HOSPITALIST

## 2019-08-19 PROCEDURE — 700101 HCHG RX REV CODE 250: Performed by: NURSE PRACTITIONER

## 2019-08-19 PROCEDURE — 94760 N-INVAS EAR/PLS OXIMETRY 1: CPT

## 2019-08-19 PROCEDURE — 700111 HCHG RX REV CODE 636 W/ 250 OVERRIDE (IP): Performed by: INTERNAL MEDICINE

## 2019-08-19 PROCEDURE — 700102 HCHG RX REV CODE 250 W/ 637 OVERRIDE(OP): Performed by: INTERNAL MEDICINE

## 2019-08-19 PROCEDURE — 82962 GLUCOSE BLOOD TEST: CPT

## 2019-08-19 PROCEDURE — 700111 HCHG RX REV CODE 636 W/ 250 OVERRIDE (IP): Performed by: HOSPITALIST

## 2019-08-19 PROCEDURE — 700102 HCHG RX REV CODE 250 W/ 637 OVERRIDE(OP): Performed by: NURSE PRACTITIONER

## 2019-08-19 RX ADMIN — INSULIN GLARGINE 10 UNITS: 100 INJECTION, SOLUTION SUBCUTANEOUS at 17:15

## 2019-08-19 RX ADMIN — GABAPENTIN 300 MG: 300 CAPSULE ORAL at 20:34

## 2019-08-19 RX ADMIN — FAMOTIDINE 40 MG: 20 TABLET ORAL at 05:15

## 2019-08-19 RX ADMIN — PROCHLORPERAZINE EDISYLATE 10 MG: 5 INJECTION INTRAMUSCULAR; INTRAVENOUS at 05:10

## 2019-08-19 RX ADMIN — TRAMADOL HYDROCHLORIDE 50 MG: 50 TABLET, FILM COATED ORAL at 17:16

## 2019-08-19 RX ADMIN — ONDANSETRON 4 MG: 2 INJECTION INTRAMUSCULAR; INTRAVENOUS at 10:24

## 2019-08-19 RX ADMIN — GABAPENTIN 300 MG: 300 CAPSULE ORAL at 11:30

## 2019-08-19 RX ADMIN — TRAMADOL HYDROCHLORIDE 50 MG: 50 TABLET, FILM COATED ORAL at 11:30

## 2019-08-19 RX ADMIN — SENNOSIDES, DOCUSATE SODIUM 2 TABLET: 50; 8.6 TABLET, FILM COATED ORAL at 05:15

## 2019-08-19 RX ADMIN — BUSPIRONE HYDROCHLORIDE 5 MG: 5 TABLET ORAL at 05:14

## 2019-08-19 RX ADMIN — TRAZODONE HYDROCHLORIDE 100 MG: 50 TABLET ORAL at 20:34

## 2019-08-19 RX ADMIN — PREGABALIN 300 MG: 150 CAPSULE ORAL at 17:24

## 2019-08-19 RX ADMIN — MYCOPHENOLATE MOFETIL 500 MG: 250 CAPSULE ORAL at 05:15

## 2019-08-19 RX ADMIN — SODIUM BICARBONATE 650 MG: 325 TABLET ORAL at 17:15

## 2019-08-19 RX ADMIN — TRAMADOL HYDROCHLORIDE 50 MG: 50 TABLET, FILM COATED ORAL at 23:20

## 2019-08-19 RX ADMIN — METHOCARBAMOL TABLETS 750 MG: 750 TABLET, COATED ORAL at 11:30

## 2019-08-19 RX ADMIN — TRAMADOL HYDROCHLORIDE 50 MG: 50 TABLET, FILM COATED ORAL at 05:14

## 2019-08-19 RX ADMIN — PREDNISONE 40 MG: 20 TABLET ORAL at 05:14

## 2019-08-19 RX ADMIN — MYCOPHENOLATE MOFETIL 500 MG: 250 CAPSULE ORAL at 17:18

## 2019-08-19 RX ADMIN — METHOCARBAMOL TABLETS 750 MG: 750 TABLET, COATED ORAL at 05:14

## 2019-08-19 RX ADMIN — LEVALBUTEROL 1.25 MG: 1.25 SOLUTION RESPIRATORY (INHALATION) at 18:12

## 2019-08-19 RX ADMIN — ASPIRIN 81 MG: 81 TABLET, COATED ORAL at 05:15

## 2019-08-19 RX ADMIN — FLUOXETINE HYDROCHLORIDE 20 MG: 20 CAPSULE ORAL at 05:14

## 2019-08-19 RX ADMIN — METHOCARBAMOL TABLETS 750 MG: 750 TABLET, COATED ORAL at 17:15

## 2019-08-19 RX ADMIN — PREGABALIN 300 MG: 150 CAPSULE ORAL at 05:15

## 2019-08-19 RX ADMIN — GABAPENTIN 300 MG: 300 CAPSULE ORAL at 17:16

## 2019-08-19 RX ADMIN — FUROSEMIDE 80 MG: 40 TABLET ORAL at 05:15

## 2019-08-19 RX ADMIN — SODIUM BICARBONATE 650 MG: 325 TABLET ORAL at 05:14

## 2019-08-19 RX ADMIN — INSULIN HUMAN 1 UNITS: 100 INJECTION, SOLUTION PARENTERAL at 11:31

## 2019-08-19 RX ADMIN — AMOXICILLIN AND CLAVULANATE POTASSIUM 1 TABLET: 875; 125 TABLET, FILM COATED ORAL at 05:15

## 2019-08-19 RX ADMIN — ZIPRASIDONE HYDROCHLORIDE 80 MG: 80 CAPSULE ORAL at 05:14

## 2019-08-19 RX ADMIN — GABAPENTIN 300 MG: 300 CAPSULE ORAL at 08:13

## 2019-08-19 RX ADMIN — LEVOTHYROXINE SODIUM 75 MCG: 75 TABLET ORAL at 05:15

## 2019-08-19 RX ADMIN — ZIPRASIDONE HYDROCHLORIDE 80 MG: 80 CAPSULE ORAL at 17:20

## 2019-08-19 RX ADMIN — BUSPIRONE HYDROCHLORIDE 5 MG: 5 TABLET ORAL at 17:16

## 2019-08-19 RX ADMIN — SODIUM BICARBONATE 650 MG: 325 TABLET ORAL at 11:30

## 2019-08-19 RX ADMIN — ONDANSETRON 4 MG: 2 INJECTION INTRAMUSCULAR; INTRAVENOUS at 17:15

## 2019-08-19 RX ADMIN — AMOXICILLIN AND CLAVULANATE POTASSIUM 1 TABLET: 875; 125 TABLET, FILM COATED ORAL at 17:15

## 2019-08-19 ASSESSMENT — ENCOUNTER SYMPTOMS
COUGH: 1
SHORTNESS OF BREATH: 1
DIARRHEA: 0
WHEEZING: 0
CHILLS: 0
FEVER: 0
SPUTUM PRODUCTION: 0

## 2019-08-19 NOTE — PROGRESS NOTES
[facility] Medicine Daily Progress Note    Date of Service  8/19/2019    Chief Complaint  28 y.o. female admitted 8/14/2019 with asthma  Exacerbation in the setting of very complex past medical history    Hospital Course    see below      Interval Problem Update  Asthma exacerbation-nearer her baseline. Still requiring some oxygen. Feels that her coughing has improved    L ear pain-now both ears hurt, but no secretions. Afebrile.     Consultants/Specialty  NONE    Code Status  FCFC    Disposition  Transfer to medical    Review of Systems  Review of Systems   Constitutional: Negative for chills, fever and malaise/fatigue.   HENT: Positive for ear pain (b/l). Tinnitus: L ear.    Respiratory: Positive for cough and shortness of breath. Negative for sputum production and wheezing.         More improvement noted   Gastrointestinal: Negative for diarrhea.   Genitourinary: Negative for dysuria.   All other systems reviewed and are negative.       Physical Exam  Temp:  [36 °C (96.8 °F)-36.6 °C (97.8 °F)] 36.2 °C (97.1 °F)  Pulse:  [74-98] 90  Resp:  [18-20] 18  BP: (110-137)/(68-78) 110/68  SpO2:  [89 %-95 %] 90 %    Physical Exam   Constitutional: She is oriented to person, place, and time. She appears well-developed and well-nourished. No distress.   Obese  Body mass index is 47.47 kg/m².     HENT:   Head: Normocephalic and atraumatic.   No external ear abnormalities noted today   Eyes: Pupils are equal, round, and reactive to light. No scleral icterus.   Neck: Normal range of motion. Neck supple.   Cardiovascular: Normal rate, regular rhythm, normal heart sounds and intact distal pulses.   No murmur heard.  Pulmonary/Chest: Effort normal. No stridor. No respiratory distress.   Speaking in full sentences  Decent aeration noted today   Abdominal: Soft. Bowel sounds are normal. She exhibits no distension.   Musculoskeletal: Normal range of motion. She exhibits no edema.   Neurological: She is alert and oriented to person,  place, and time. Coordination normal.   Skin: Skin is warm and dry. No rash noted.   Psychiatric: She has a normal mood and affect.   Nursing note and vitals reviewed.                    Fluids    Intake/Output Summary (Last 24 hours) at 8/19/2019 1620  Last data filed at 8/19/2019 0830  Gross per 24 hour   Intake 240 ml   Output --   Net 240 ml       Laboratory                        Imaging  DX-CHEST-2 VIEWS   Final Result         1. No significant interval change. No pulmonary infiltrates or consolidations are noted.      DX-CHEST-PORTABLE (1 VIEW)   Final Result      No acute cardiopulmonary abnormality.           Assessment/Plan  * Asthma with exacerbation- (present on admission)  Assessment & Plan  -nearly resolved, home soon  No evidence of acute infection except skin wounds noted  Continue  prednisone 40 mg daily, tolerating well, day#4, short taper at home  -consider ordering home nebulizer  -BD's as outlined below  Duo nebs / Xopenex   02 to keep sats 88-92%  Needs outpatient PFTS to be done   -Pa lateral CXR today, personally reviewed by me shows B/L clear lungs      Breast wound- (present on admission)  Assessment & Plan  She has multiple wounds over breast and trunk seem self inflicted   -on oral augmentin, appears to be tolerating well  Wound care consult     Controlled type 2 diabetes mellitus without complication, without long-term current use of insulin (HCC)- (present on admission)  Assessment & Plan  SSI ordered  Hold home medications   accu checks   -monitor sugars closely while on steroids for respiratory issues  -with starting lantus 10 units daily, BS's are acceptable at this time    Left ear pain- (present on admission)  Assessment & Plan  -no bilateral,no e/o active infection, monitor  -if worsens, consider CT scan to r/o mastoiditis    Optic neuritis- (present on admission)  Assessment & Plan  On celcept and chronic steroids     Chronic respiratory failure with hypoxia, on home oxygen  therapy (HCC)- (present on admission)  Assessment & Plan  At baseline home 02 demand   Does have respiratory distress probably needs ambulatory study prior to d/c  PT to evaluate     TONYA (obstructive sleep apnea)- (present on admission)  Assessment & Plan  Needs follow up for CPAP machine     Acquired hypothyroidism- (present on admission)  Assessment & Plan  Resume home levothyroxine   TSH within normal limits     GERD (gastroesophageal reflux disease)- (present on admission)  Assessment & Plan  Resume home pepcid    Peripheral neuropathy (CMS-[facility])- (present on admission)  Assessment & Plan  Resume home lyrica and gabapentin     Schizophrenia ([facility])- (present on admission)  Assessment & Plan  Hx of resume home medications     PCOS (polycystic ovarian syndrome)- (present on admission)  Assessment & Plan  Hx of     Progressive focal motor weakness- (present on admission)  Assessment & Plan  Hx of  PT/OT evaluations pending    Anxiety- (present on admission)  Assessment & Plan  Resume home buspar and prozac       VTE prophylaxis: SCD's

## 2019-08-19 NOTE — CARE PLAN
Problem: Bowel/Gastric:  Goal: Will not experience complications related to bowel motility  Outcome: PROGRESSING AS EXPECTED  Flowsheets (Taken 8/18/2019 2045)  Last BM: 08/18/19  Denies abd pain/cramping. No problems verbalized at this time.    Problem: Pain Management  Goal: Pain level will decrease to patient's comfort goal  Outcome: PROGRESSING AS EXPECTED  Flowsheets  Taken 8/18/2019 2045  Non Verbal Scale : Calm;Sleeping  Taken 8/18/2019 2241  Pain Rating Scale (NPRS): 2   C/o pain, but denies need for medication. Pt is sleeping well.

## 2019-08-19 NOTE — PROGRESS NOTES
1 insulin unit given to pt. Had Nurse apprentice scan in medications, MAR did not trigger second RN to co-sign. Second RN insulin verification done with SONYA Vallecillo

## 2019-08-19 NOTE — PROGRESS NOTES
Report called to [place] [facility] RN. Pt updated on POC and verbalizes understanding. Awaiting transport to [facility]. Will continue to assess and monitor.

## 2019-08-19 NOTE — CARE PLAN
Problem: Safety  Goal: Will remain free from injury  Outcome: PROGRESSING AS EXPECTED  Bed locked and in lowest position. Call light within reach. Hourly rounding      Problem: Pain Management  Goal: Pain level will decrease to patient's comfort goal  Outcome: PROGRESSING AS EXPECTED  Pt states use of tramadol for pain. Pt medicated per MAR

## 2019-08-20 ENCOUNTER — APPOINTMENT (OUTPATIENT)
Dept: RADIOLOGY | Facility: MEDICAL CENTER | Age: 29
DRG: 202 | End: 2019-08-20
Attending: NURSE PRACTITIONER
Payer: MEDICARE

## 2019-08-20 LAB
APPEARANCE UR: ABNORMAL
BACTERIA #/AREA URNS HPF: NEGATIVE /HPF
BILIRUB UR QL STRIP.AUTO: NEGATIVE
COLOR UR: YELLOW
EPI CELLS #/AREA URNS HPF: NEGATIVE /HPF
GLUCOSE BLD-MCNC: 103 MG/DL (ref 65–99)
GLUCOSE BLD-MCNC: 111 MG/DL (ref 65–99)
GLUCOSE BLD-MCNC: 125 MG/DL (ref 65–99)
GLUCOSE BLD-MCNC: 134 MG/DL (ref 65–99)
GLUCOSE BLD-MCNC: 225 MG/DL (ref 65–99)
GLUCOSE UR STRIP.AUTO-MCNC: NEGATIVE MG/DL
HYALINE CASTS #/AREA URNS LPF: NORMAL /LPF
KETONES UR STRIP.AUTO-MCNC: NEGATIVE MG/DL
LEUKOCYTE ESTERASE UR QL STRIP.AUTO: NEGATIVE
MICRO URNS: ABNORMAL
NITRITE UR QL STRIP.AUTO: NEGATIVE
PH UR STRIP.AUTO: 7.5 [PH] (ref 5–8)
PROT UR QL STRIP: NEGATIVE MG/DL
RBC # URNS HPF: NORMAL /HPF
RBC UR QL AUTO: NEGATIVE
SP GR UR STRIP.AUTO: 1.01
UROBILINOGEN UR STRIP.AUTO-MCNC: 0.2 MG/DL
WBC #/AREA URNS HPF: NORMAL /HPF

## 2019-08-20 PROCEDURE — A9270 NON-COVERED ITEM OR SERVICE: HCPCS | Performed by: HOSPITALIST

## 2019-08-20 PROCEDURE — 81001 URINALYSIS AUTO W/SCOPE: CPT

## 2019-08-20 PROCEDURE — A9270 NON-COVERED ITEM OR SERVICE: HCPCS | Performed by: INTERNAL MEDICINE

## 2019-08-20 PROCEDURE — 700102 HCHG RX REV CODE 250 W/ 637 OVERRIDE(OP): Performed by: INTERNAL MEDICINE

## 2019-08-20 PROCEDURE — 700102 HCHG RX REV CODE 250 W/ 637 OVERRIDE(OP): Performed by: HOSPITALIST

## 2019-08-20 PROCEDURE — A9270 NON-COVERED ITEM OR SERVICE: HCPCS | Performed by: NURSE PRACTITIONER

## 2019-08-20 PROCEDURE — 770006 HCHG ROOM/CARE - MED/SURG/GYN SEMI*

## 2019-08-20 PROCEDURE — 82962 GLUCOSE BLOOD TEST: CPT | Mod: 91

## 2019-08-20 PROCEDURE — 700111 HCHG RX REV CODE 636 W/ 250 OVERRIDE (IP): Performed by: INTERNAL MEDICINE

## 2019-08-20 PROCEDURE — 700102 HCHG RX REV CODE 250 W/ 637 OVERRIDE(OP): Performed by: NURSE PRACTITIONER

## 2019-08-20 PROCEDURE — 76775 US EXAM ABDO BACK WALL LIM: CPT

## 2019-08-20 PROCEDURE — 99232 SBSQ HOSP IP/OBS MODERATE 35: CPT | Performed by: HOSPITALIST

## 2019-08-20 PROCEDURE — 700111 HCHG RX REV CODE 636 W/ 250 OVERRIDE (IP): Performed by: HOSPITALIST

## 2019-08-20 RX ORDER — BUDESONIDE AND FORMOTEROL FUMARATE DIHYDRATE 160; 4.5 UG/1; UG/1
2 AEROSOL RESPIRATORY (INHALATION)
Status: DISCONTINUED | OUTPATIENT
Start: 2019-08-20 | End: 2019-08-21 | Stop reason: HOSPADM

## 2019-08-20 RX ORDER — HYDROCODONE BITARTRATE AND ACETAMINOPHEN 5; 325 MG/1; MG/1
1-2 TABLET ORAL EVERY 6 HOURS PRN
Status: DISCONTINUED | OUTPATIENT
Start: 2019-08-20 | End: 2019-08-21 | Stop reason: HOSPADM

## 2019-08-20 RX ORDER — HYDROCODONE BITARTRATE AND ACETAMINOPHEN 10; 325 MG/1; MG/1
1 TABLET ORAL EVERY 6 HOURS PRN
Status: DISCONTINUED | OUTPATIENT
Start: 2019-08-20 | End: 2019-08-20

## 2019-08-20 RX ADMIN — METHOCARBAMOL TABLETS 750 MG: 750 TABLET, COATED ORAL at 11:37

## 2019-08-20 RX ADMIN — METHOCARBAMOL TABLETS 750 MG: 750 TABLET, COATED ORAL at 17:26

## 2019-08-20 RX ADMIN — PROCHLORPERAZINE EDISYLATE 5 MG: 5 INJECTION INTRAMUSCULAR; INTRAVENOUS at 14:35

## 2019-08-20 RX ADMIN — METHOCARBAMOL TABLETS 750 MG: 750 TABLET, COATED ORAL at 05:25

## 2019-08-20 RX ADMIN — GABAPENTIN 300 MG: 300 CAPSULE ORAL at 07:49

## 2019-08-20 RX ADMIN — BUDESONIDE AND FORMOTEROL FUMARATE DIHYDRATE 2 PUFF: 160; 4.5 AEROSOL RESPIRATORY (INHALATION) at 17:27

## 2019-08-20 RX ADMIN — SODIUM BICARBONATE 650 MG: 325 TABLET ORAL at 11:36

## 2019-08-20 RX ADMIN — AMOXICILLIN AND CLAVULANATE POTASSIUM 1 TABLET: 875; 125 TABLET, FILM COATED ORAL at 05:25

## 2019-08-20 RX ADMIN — SODIUM BICARBONATE 650 MG: 325 TABLET ORAL at 18:00

## 2019-08-20 RX ADMIN — INSULIN HUMAN 2 UNITS: 100 INJECTION, SOLUTION PARENTERAL at 11:35

## 2019-08-20 RX ADMIN — FUROSEMIDE 80 MG: 40 TABLET ORAL at 05:25

## 2019-08-20 RX ADMIN — GABAPENTIN 300 MG: 300 CAPSULE ORAL at 17:00

## 2019-08-20 RX ADMIN — MYCOPHENOLATE MOFETIL 500 MG: 250 CAPSULE ORAL at 05:26

## 2019-08-20 RX ADMIN — TRAMADOL HYDROCHLORIDE 50 MG: 50 TABLET, FILM COATED ORAL at 21:17

## 2019-08-20 RX ADMIN — PROCHLORPERAZINE EDISYLATE 10 MG: 5 INJECTION INTRAMUSCULAR; INTRAVENOUS at 20:40

## 2019-08-20 RX ADMIN — BUSPIRONE HYDROCHLORIDE 5 MG: 5 TABLET ORAL at 05:25

## 2019-08-20 RX ADMIN — TRAMADOL HYDROCHLORIDE 50 MG: 50 TABLET, FILM COATED ORAL at 05:25

## 2019-08-20 RX ADMIN — TRAMADOL HYDROCHLORIDE 50 MG: 50 TABLET, FILM COATED ORAL at 11:37

## 2019-08-20 RX ADMIN — FAMOTIDINE 40 MG: 20 TABLET ORAL at 06:00

## 2019-08-20 RX ADMIN — GABAPENTIN 300 MG: 300 CAPSULE ORAL at 21:17

## 2019-08-20 RX ADMIN — SENNOSIDES, DOCUSATE SODIUM 2 TABLET: 50; 8.6 TABLET, FILM COATED ORAL at 17:24

## 2019-08-20 RX ADMIN — FLUOXETINE HYDROCHLORIDE 20 MG: 20 CAPSULE ORAL at 05:26

## 2019-08-20 RX ADMIN — PREDNISONE 40 MG: 20 TABLET ORAL at 05:24

## 2019-08-20 RX ADMIN — BUSPIRONE HYDROCHLORIDE 5 MG: 5 TABLET ORAL at 17:26

## 2019-08-20 RX ADMIN — ZIPRASIDONE HYDROCHLORIDE 80 MG: 80 CAPSULE ORAL at 05:24

## 2019-08-20 RX ADMIN — PREGABALIN 300 MG: 150 CAPSULE ORAL at 05:33

## 2019-08-20 RX ADMIN — MYCOPHENOLATE MOFETIL 500 MG: 250 CAPSULE ORAL at 17:24

## 2019-08-20 RX ADMIN — SODIUM BICARBONATE 650 MG: 325 TABLET ORAL at 05:24

## 2019-08-20 RX ADMIN — LEVOTHYROXINE SODIUM 75 MCG: 75 TABLET ORAL at 05:25

## 2019-08-20 RX ADMIN — ZIPRASIDONE HYDROCHLORIDE 80 MG: 80 CAPSULE ORAL at 17:37

## 2019-08-20 RX ADMIN — SENNOSIDES, DOCUSATE SODIUM 2 TABLET: 50; 8.6 TABLET, FILM COATED ORAL at 05:25

## 2019-08-20 RX ADMIN — POLYETHYLENE GLYCOL 3350 1 PACKET: 17 POWDER, FOR SOLUTION ORAL at 11:52

## 2019-08-20 RX ADMIN — PREGABALIN 300 MG: 150 CAPSULE ORAL at 17:25

## 2019-08-20 RX ADMIN — ONDANSETRON 4 MG: 2 INJECTION INTRAMUSCULAR; INTRAVENOUS at 02:47

## 2019-08-20 RX ADMIN — HYDROCODONE BITARTRATE AND ACETAMINOPHEN 1 TABLET: 5; 325 TABLET ORAL at 17:25

## 2019-08-20 RX ADMIN — GABAPENTIN 300 MG: 300 CAPSULE ORAL at 11:37

## 2019-08-20 RX ADMIN — INSULIN GLARGINE 10 UNITS: 100 INJECTION, SOLUTION SUBCUTANEOUS at 17:30

## 2019-08-20 RX ADMIN — ASPIRIN 81 MG: 81 TABLET, COATED ORAL at 05:24

## 2019-08-20 RX ADMIN — TRAZODONE HYDROCHLORIDE 100 MG: 50 TABLET ORAL at 21:17

## 2019-08-20 RX ADMIN — ONDANSETRON 4 MG: 2 INJECTION INTRAMUSCULAR; INTRAVENOUS at 09:57

## 2019-08-20 ASSESSMENT — ENCOUNTER SYMPTOMS
NEUROLOGICAL NEGATIVE: 1
SPUTUM PRODUCTION: 1
WHEEZING: 0
CHILLS: 0
VOMITING: 0
SHORTNESS OF BREATH: 1
DIARRHEA: 0
COUGH: 1
NAUSEA: 0
FEVER: 0
CONSTIPATION: 0
PALPITATIONS: 0
ABDOMINAL PAIN: 0
FLANK PAIN: 1

## 2019-08-20 NOTE — DISCHARGE PLANNING
Agency/Facility Name: [facility]  Spoke To: Dayo  Outcome: [facility] states they requested an order last week and have not yet received one. Length of need must state 99 months. LSW notified.

## 2019-08-20 NOTE — CARE PLAN
Problem: Communication  Goal: The ability to communicate needs accurately and effectively will improve  Outcome: PROGRESSING AS EXPECTED   Patient POC being communicated with patient and family.     Problem: Safety  Goal: Will remain free from falls  Outcome: PROGRESSING AS EXPECTED   Patient calls appropriately for assistance, bed in locked and lowest position, call light within reach.

## 2019-08-21 ENCOUNTER — HOME HEALTH ADMISSION (OUTPATIENT)
Dept: HOME HEALTH SERVICES | Facility: HOME HEALTHCARE | Age: 29
End: 2019-08-21
Payer: MEDICARE

## 2019-08-21 ENCOUNTER — PATIENT OUTREACH (OUTPATIENT)
Dept: HEALTH INFORMATION MANAGEMENT | Facility: OTHER | Age: 29
End: 2019-08-21

## 2019-08-21 VITALS
OXYGEN SATURATION: 96 % | HEART RATE: 81 BPM | HEIGHT: 65 IN | WEIGHT: 276.9 LBS | DIASTOLIC BLOOD PRESSURE: 73 MMHG | SYSTOLIC BLOOD PRESSURE: 128 MMHG | BODY MASS INDEX: 46.13 KG/M2 | RESPIRATION RATE: 19 BRPM | TEMPERATURE: 97.6 F

## 2019-08-21 LAB
GLUCOSE BLD-MCNC: 112 MG/DL (ref 65–99)
GLUCOSE BLD-MCNC: 125 MG/DL (ref 65–99)
GLUCOSE BLD-MCNC: 205 MG/DL (ref 65–99)

## 2019-08-21 PROCEDURE — 700102 HCHG RX REV CODE 250 W/ 637 OVERRIDE(OP): Performed by: HOSPITALIST

## 2019-08-21 PROCEDURE — 82962 GLUCOSE BLOOD TEST: CPT

## 2019-08-21 PROCEDURE — A9270 NON-COVERED ITEM OR SERVICE: HCPCS | Performed by: NURSE PRACTITIONER

## 2019-08-21 PROCEDURE — 700111 HCHG RX REV CODE 636 W/ 250 OVERRIDE (IP): Performed by: HOSPITALIST

## 2019-08-21 PROCEDURE — 700102 HCHG RX REV CODE 250 W/ 637 OVERRIDE(OP): Performed by: NURSE PRACTITIONER

## 2019-08-21 PROCEDURE — 51798 US URINE CAPACITY MEASURE: CPT

## 2019-08-21 PROCEDURE — 700111 HCHG RX REV CODE 636 W/ 250 OVERRIDE (IP): Performed by: INTERNAL MEDICINE

## 2019-08-21 PROCEDURE — 99239 HOSP IP/OBS DSCHRG MGMT >30: CPT | Performed by: HOSPITALIST

## 2019-08-21 PROCEDURE — A9270 NON-COVERED ITEM OR SERVICE: HCPCS | Performed by: HOSPITALIST

## 2019-08-21 PROCEDURE — 97535 SELF CARE MNGMENT TRAINING: CPT

## 2019-08-21 RX ORDER — BUDESONIDE AND FORMOTEROL FUMARATE DIHYDRATE 160; 4.5 UG/1; UG/1
2 AEROSOL RESPIRATORY (INHALATION) 2 TIMES DAILY
Qty: 1 INHALER | Refills: 0 | Status: SHIPPED | OUTPATIENT
Start: 2019-08-21 | End: 2019-08-23 | Stop reason: SDUPTHER

## 2019-08-21 RX ORDER — LEVALBUTEROL INHALATION SOLUTION 1.25 MG/3ML
1.25 SOLUTION RESPIRATORY (INHALATION) EVERY 4 HOURS PRN
Qty: 24 ML | Refills: 0 | Status: SHIPPED | OUTPATIENT
Start: 2019-08-21 | End: 2019-08-23

## 2019-08-21 RX ORDER — HYDROCODONE BITARTRATE AND ACETAMINOPHEN 5; 325 MG/1; MG/1
1-2 TABLET ORAL EVERY 6 HOURS PRN
Qty: 20 TAB | Refills: 0 | Status: SHIPPED | OUTPATIENT
Start: 2019-08-21 | End: 2019-08-21

## 2019-08-21 RX ORDER — HYDROCODONE BITARTRATE AND ACETAMINOPHEN 5; 325 MG/1; MG/1
1-2 TABLET ORAL EVERY 6 HOURS PRN
Qty: 20 TAB | Refills: 0 | Status: SHIPPED | OUTPATIENT
Start: 2019-08-21 | End: 2019-08-24

## 2019-08-21 RX ORDER — PROMETHAZINE HYDROCHLORIDE 12.5 MG/1
12.5-25 TABLET ORAL EVERY 4 HOURS PRN
Qty: 30 TAB | Refills: 0 | Status: SHIPPED | OUTPATIENT
Start: 2019-08-21 | End: 2019-08-26

## 2019-08-21 RX ADMIN — SENNOSIDES, DOCUSATE SODIUM 2 TABLET: 50; 8.6 TABLET, FILM COATED ORAL at 05:39

## 2019-08-21 RX ADMIN — GABAPENTIN 300 MG: 300 CAPSULE ORAL at 09:56

## 2019-08-21 RX ADMIN — INSULIN HUMAN 2 UNITS: 100 INJECTION, SOLUTION PARENTERAL at 11:04

## 2019-08-21 RX ADMIN — MYCOPHENOLATE MOFETIL 500 MG: 250 CAPSULE ORAL at 05:39

## 2019-08-21 RX ADMIN — HYDROCODONE BITARTRATE AND ACETAMINOPHEN 2 TABLET: 5; 325 TABLET ORAL at 08:30

## 2019-08-21 RX ADMIN — GABAPENTIN 300 MG: 300 CAPSULE ORAL at 13:30

## 2019-08-21 RX ADMIN — SODIUM BICARBONATE 650 MG: 325 TABLET ORAL at 06:00

## 2019-08-21 RX ADMIN — FAMOTIDINE 40 MG: 20 TABLET ORAL at 05:39

## 2019-08-21 RX ADMIN — SODIUM BICARBONATE 650 MG: 325 TABLET ORAL at 11:31

## 2019-08-21 RX ADMIN — PROCHLORPERAZINE EDISYLATE 10 MG: 5 INJECTION INTRAMUSCULAR; INTRAVENOUS at 05:38

## 2019-08-21 RX ADMIN — PREGABALIN 300 MG: 150 CAPSULE ORAL at 05:39

## 2019-08-21 RX ADMIN — PREDNISONE 40 MG: 20 TABLET ORAL at 05:40

## 2019-08-21 RX ADMIN — ASPIRIN 81 MG: 81 TABLET, COATED ORAL at 05:40

## 2019-08-21 RX ADMIN — TRAMADOL HYDROCHLORIDE 50 MG: 50 TABLET, FILM COATED ORAL at 10:35

## 2019-08-21 RX ADMIN — ZIPRASIDONE HYDROCHLORIDE 80 MG: 80 CAPSULE ORAL at 05:43

## 2019-08-21 RX ADMIN — HYDROCODONE BITARTRATE AND ACETAMINOPHEN 2 TABLET: 5; 325 TABLET ORAL at 14:28

## 2019-08-21 RX ADMIN — METHOCARBAMOL TABLETS 750 MG: 750 TABLET, COATED ORAL at 06:00

## 2019-08-21 RX ADMIN — METHOCARBAMOL TABLETS 750 MG: 750 TABLET, COATED ORAL at 11:31

## 2019-08-21 RX ADMIN — BUSPIRONE HYDROCHLORIDE 5 MG: 5 TABLET ORAL at 05:40

## 2019-08-21 RX ADMIN — LEVOTHYROXINE SODIUM 75 MCG: 75 TABLET ORAL at 05:40

## 2019-08-21 RX ADMIN — HYDROCODONE BITARTRATE AND ACETAMINOPHEN 1 TABLET: 5; 325 TABLET ORAL at 02:35

## 2019-08-21 RX ADMIN — FLUOXETINE HYDROCHLORIDE 20 MG: 20 CAPSULE ORAL at 05:40

## 2019-08-21 RX ADMIN — FUROSEMIDE 80 MG: 40 TABLET ORAL at 05:40

## 2019-08-21 RX ADMIN — PROCHLORPERAZINE EDISYLATE 10 MG: 5 INJECTION INTRAMUSCULAR; INTRAVENOUS at 13:26

## 2019-08-21 RX ADMIN — TRAMADOL HYDROCHLORIDE 50 MG: 50 TABLET, FILM COATED ORAL at 04:19

## 2019-08-21 ASSESSMENT — COGNITIVE AND FUNCTIONAL STATUS - GENERAL
DRESSING REGULAR LOWER BODY CLOTHING: A LITTLE
HELP NEEDED FOR BATHING: A LITTLE
DAILY ACTIVITIY SCORE: 22
SUGGESTED CMS G CODE MODIFIER DAILY ACTIVITY: CJ

## 2019-08-21 NOTE — CARE PLAN
Problem: Safety  Goal: Will remain free from falls  Outcome: PROGRESSING AS EXPECTED   Fall risk assessment completed.     Problem: Pain Management  Goal: Pain level will decrease to patient's comfort goal  Outcome: PROGRESSING AS EXPECTED   Medicated for pain per mar.

## 2019-08-21 NOTE — DISCHARGE PLANNING
Anticipated Discharge Disposition: Home with HH and DME    Action: LSW received HH choice from the Pt for [facility].    Barriers to Discharge: HH acceptance    Plan: follow up on HH referral

## 2019-08-21 NOTE — DISCHARGE PLANNING
Agency/Facility Name: [facility]  Spoke To: Dayo  Outcome: [facility] will deliver nebulizer to the home today. They ask that the patient call to set up delivery time as soon as they get home from the hospital. Bedside RN and LSW notified.

## 2019-08-21 NOTE — DISCHARGE SUMMARY
Discharge Summary    CHIEF COMPLAINT ON ADMISSION  Chief Complaint   Patient presents with   • Shortness of Breath     increasing over last 2 wks    • Chest Wall Pain   • Cough       Reason for Admission  CHENCHO FRANCO     Admission Date  8/14/2019    CODE STATUS  Full Code    HPI & HOSPITAL COURSE  28 y.o. female admitted 8/14/2019 with a complex medical history. She had an asthma exacerbation and required oxygen above her home dose.  She developed a painful left flank, worse on inspiration and to palpation. Her renal ultrasound and urinalysis were negative. She likely strained a thoracic muscle from coughing during her asthma exacerbation. Her chest xray was clear. She was also having bilateral ear pain and throbbing during this admission and states she had an ear tube placed with Dr. Chan one week ago. The wounds on the patient's breasts are improving and granulating. Her optic neuritis symptoms have been stable this admission. She states she has weakness in her legs, palpitations, dizziness and nausea. She denies numbness. She states zofran has not been working well but the compazine has been effective. She has been occasionally tachycardic, highest heart rate of 113 this admission. She is likely close to her baseline.    Therefore, she is discharged in good and stable condition to home with organized home healthcare and close outpatient follow-up.    The patient met 2-midnight criteria for an inpatient stay at the time of discharge.    Discharge Date  8/21/19    FOLLOW UP ITEMS POST DISCHARGE  Home Health  Please use handheld Albuterol inhaler for shortness of breath until nebulizer arrives.  Need a CPAP machine  Follow up with Dr. Chan if ear pain persists    DISCHARGE DIAGNOSES  Principal Problem:    Asthma with exacerbation POA: Yes  Active Problems:    Controlled type 2 diabetes mellitus without complication, without long-term current use of insulin (HCC) POA: Yes    Breast wound POA: Yes    Peripheral  neuropathy (CMS-HCC) (Chronic) POA: Yes    GERD (gastroesophageal reflux disease) (Chronic) POA: Yes    Acquired hypothyroidism (Chronic) POA: Yes    TONYA (obstructive sleep apnea) POA: Yes    Chronic respiratory failure with hypoxia, on home oxygen therapy ([facility]) POA: Yes    Optic neuritis POA: Yes    Left ear pain POA: Yes    Anxiety POA: Yes    Progressive focal motor weakness POA: Yes    PCOS (polycystic ovarian syndrome) POA: Yes    Schizophrenia ([facility]) POA: Yes  Resolved Problems:    Recurrent UTI (Chronic) POA: Yes      Overview: Chronic UTI on macrodantin      FOLLOW UP  Future Appointments   Date Time Provider Department Center   8/23/2019 11:20 AM Torres Brody M.D. [address]   9/5/2019  7:45 AM PFT-RM1 PULM None   9/5/2019  8:45 AM SPIROMETRY/6MW PULM None   9/5/2019  9:15 AM Ignacia Herrera M.D. PULM None   10/2/2019  8:40 AM John Leon M.D. [facility] None   10/2/2019  9:00 AM Torres Brody M.D. [address]   10/31/2019  8:20 AM Shahriar Moncada M.D. [facility] None   11/1/2019  3:00 PM Crissy Serrato M.D. [facility] None     No follow-up provider specified.    MEDICATIONS ON DISCHARGE     Medication List      START taking these medications      Instructions   budesonide-formoterol 160-4.5 MCG/ACT Aero  Commonly known as:  SYMBICORT   Inhale 2 Puffs by mouth 2 Times a Day.  Dose:  2 Puff     HYDROcodone-acetaminophen 5-325 MG Tabs per tablet  Commonly known as:  NORCO   Take 1-2 Tabs by mouth every 6 hours as needed for up to 3 days.  Dose:  1-2 Tab     levalbuterol 1.25 MG/3ML Nebu  Commonly known as:  XOPENEX   3 mL by Nebulization route every four hours as needed for Shortness of Breath for up to 30 days.  Dose:  1.25 mg     promethazine 12.5 MG tablet  Commonly known as:  PHENERGAN   Take 1-2 Tabs by mouth every four hours as needed for Nausea/Vomiting (if no relief from ondansetron or if ondansetron is not ordered) for up to 5 days.  Dose:  12.5-25 mg        CONTINUE taking these medications       Instructions   albuterol 108 (90 Base) MCG/ACT Aers inhalation aerosol   Inhale 2 Puffs by mouth every 6 hours as needed for Shortness of Breath.  Dose:  2 Puff     aspirin EC 81 MG Tbec  Commonly known as:  ECOTRIN   Take 1 Tab by mouth every day.  Dose:  81 mg     busPIRone 5 MG tablet  Commonly known as:  BUSPAR   TAKE ONE TABLET BY MOUTH TWICE DAILY     CELLCEPT 500 MG tablet  Generic drug:  mycophenolate   Take 500-1,000 mg by mouth 2 times a day. 500 mg in AM and 1000 mg at HS  Dose:  500-1,000 mg     Diclofenac Sodium 1 % Gel   Apply 1 Application to skin as directed 4 times a day. Apply's on wrist, back, ankles, and feet.  Dose:  1 Application     FLUoxetine 20 MG Caps  Commonly known as:  PROZAC   Take 1 Cap by mouth every day.  Dose:  20 mg     folic acid 1 MG Tabs  Commonly known as:  FOLVITE   Take 1 Tab by mouth every day.  Dose:  1 mg     furosemide 80 MG Tabs  Commonly known as:  LASIX   Take 80 mg by mouth every day.  Dose:  80 mg     gabapentin 300 MG Caps  Commonly known as:  NEURONTIN   Take 300 mg by mouth 4 times a day.  Dose:  300 mg     ivabradine 5 MG Tabs tablet  Commonly known as:  CORLANOR   Take 1.5 Tabs by mouth 2 times a day, with meals.  Dose:  7.5 mg     levothyroxine 75 MCG Tabs  Commonly known as:  SYNTHROID   Take 1 Tab by mouth Every morning on an empty stomach.  Dose:  75 mcg     LYRICA 300 MG capsule  Generic drug:  pregabalin   Take 300 mg by mouth 2 times a day.  Dose:  300 mg     Melatonin 5 MG Tabs   Take 10 mg by mouth every day.  Dose:  10 mg     methocarbamol 750 MG Tabs  Commonly known as:  ROBAXIN   Take 750 mg by mouth 3 times a day.  Dose:  750 mg     NEXPLANON 68 MG Impl implant  Generic drug:  etonogestrel   Inject 1 Each as instructed Once.  Dose:  1 Each     ondansetron 4 MG Tabs tablet  Commonly known as:  ZOFRAN   Take 1 Tab by mouth every four hours as needed for Nausea/Vomiting.  Dose:  4 mg     phenazopyridine 200 MG Tabs  Commonly known as:  PYRIDIUM    "Take 1 Tab by mouth 3 times a day as needed.  Dose:  200 mg     predniSONE 20 MG Tabs  Commonly known as:  DELTASONE   Take 20 mg by mouth every day.  Dose:  20 mg     ranitidine 300 MG tablet  Commonly known as:  ZANTAC   Take 1 Tab by mouth every day.  Dose:  300 mg     sodium bicarbonate 325 MG Tabs   Take 325-650 mg by mouth 3 times a day. 650 mg in AM  325 mg mid-afternoon  650 mg at night  Dose:  325-650 mg     traZODone 100 MG Tabs  Commonly known as:  DESYREL   TAKE  ONE TABLET BY MOUTH NIGHTLY AT BEDTIME     TRULICITY 0.75 MG/0.5ML Sopn  Generic drug:  Dulaglutide   Inject 0.75 mg as instructed every Friday.  Dose:  0.75 mg     TYLENOL PM EXTRA STRENGTH PO   Take 2 Caps by mouth every day.  Dose:  2 Cap     ziprasidone 80 MG Caps  Commonly known as:  GEODON   Take 80 mg by mouth 2 Times a Day.  Dose:  80 mg            Allergies  Allergies   Allergen Reactions   • Cefdinir Shortness of Breath and Itching     Tolerated 1/18/17  Tolerates ceftriaxone    • Depakote [Divalproex Sodium] Unspecified     Muscle spasms/muscle pain and weakness     • Doxycycline Anaphylaxis and Vomiting     RXN=unknown   • Amitriptyline Unspecified     Headaches     • Aripiprazole [Abilify] Unspecified     Headaches/muscle twitching     • Ciprofloxacin Rash     Pt states \"I get a rash\".     • Clindamycin Nausea     Even with food     • Ees [Erythromycin] Vomiting and Nausea   • Flagyl [Metronidazole Hcl] Unspecified     \"eye problems\"     • Flomax [Tamsulosin Hydrochloride] Swelling   • Metformin Unspecified     Increased lactic acid      • Tape Rash     Tears skin off  coban with Tegaderm tape ok intermittently  RXN=ongoing   • Vancomycin Itching     Pt becomes flushed in face and chest.   RXN=7/10/16   • Wound Dressing Adhesive Hives     By pt report   • Cephalexin [Keflex] Rash     Pt states she gets a rash with this medication  Tolerates ceftriaxone   • Divalproex Sodium [Valproic Acid] Rash     .   • Levofloxacin Unspecified     " Leg muscle cramps   • Metronidazole Rash     .   • Tamsulosin Hcl        DIET  Orders Placed This Encounter   Procedures   • Diet Order Cardiac, Diabetic     Standing Status:   Standing     Number of Occurrences:   1     Order Specific Question:   Diet:     Answer:   Cardiac [6]     Order Specific Question:   Diet:     Answer:   Diabetic [3]       ACTIVITY  As tolerated.  Weight bearing as tolerated    LABORATORY  Lab Results   Component Value Date    SODIUM 134 (L) 08/15/2019    POTASSIUM 4.0 08/15/2019    CHLORIDE 106 08/15/2019    CO2 15 (L) 08/15/2019    GLUCOSE 237 (H) 08/15/2019    BUN 9 08/15/2019    CREATININE 0.57 08/15/2019    CREATININE 0.75 (L) 07/20/2010    GLOMRATE >59 07/20/2010        Lab Results   Component Value Date    WBC 6.8 08/15/2019    WBC 6.1 07/20/2010    HEMOGLOBIN 12.9 08/15/2019    HEMATOCRIT 39.1 08/15/2019    PLATELETCT 165 08/15/2019        Total time of the discharge process exceeds 31 minutes.

## 2019-08-21 NOTE — PROGRESS NOTES
[facility] Medicine Daily Progress Note    Date of Service  8/20/2019    Chief Complaint  Shortness of breath    Hospital Course    28 y.o. female admitted 8/14/2019 with a complex medical history. She had an asthma exacerbation and required oxygen above her home dose.       Interval Problem Update  Now at her respiratory baseline. Feels her breathing has improved, but has new left flank pain. Bilateral ear throbbing, but no secretions. Afebrile. She states she has urinary frequency, urgency and burning on urination.    Consultants/Specialty  NONE    Code Status  Full    Disposition  Home when nebulizer DME delivered    Review of Systems  Review of Systems   Constitutional: Negative for chills, fever and malaise/fatigue.   HENT: Positive for ear pain (b/l). Negative for hearing loss and tinnitus (L ear).    Respiratory: Positive for cough, sputum production and shortness of breath. Negative for wheezing.    Cardiovascular: Positive for chest pain. Negative for palpitations.        Pleuritic pain on inspiration   Gastrointestinal: Negative for abdominal pain, constipation, diarrhea, nausea and vomiting.   Genitourinary: Positive for dysuria, flank pain, frequency and urgency. Negative for hematuria.   Neurological: Negative.    All other systems reviewed and are negative.       Physical Exam  Temp:  [35.8 °C (96.5 °F)-36.3 °C (97.4 °F)] 36.1 °C (96.9 °F)  Pulse:  [76-93] 93  Resp:  [16-18] 16  BP: (110-127)/(71-72) 123/71  SpO2:  [96 %-99 %] 97 %    Physical Exam   Constitutional: She is oriented to person, place, and time. Vital signs are normal. She appears well-developed and well-nourished. No distress.   Obese  Body mass index is 47.47 kg/m².     HENT:   Head: Normocephalic and atraumatic.   Right Ear: External ear normal.   Left Ear: External ear normal.   No external ear abnormalities noted today   Eyes: Pupils are equal, round, and reactive to light. No scleral icterus.   Neck: Normal range of motion. Neck  supple.   Cardiovascular: Normal rate, regular rhythm, normal heart sounds and intact distal pulses.   No murmur heard.  Pulmonary/Chest: Effort normal. No stridor. No respiratory distress. She has decreased breath sounds in the right lower field and the left lower field. She has no wheezes.   Abdominal: Soft. Bowel sounds are normal. She exhibits no distension. There is no tenderness. There is CVA tenderness.   Left   Musculoskeletal: Normal range of motion. She exhibits no edema.   Neurological: She is alert and oriented to person, place, and time. Coordination normal.   Skin: Skin is warm and dry. No rash noted.   Psychiatric: She has a normal mood and affect. Her speech is normal and behavior is normal. Thought content normal.   Nursing note and vitals reviewed.                    Fluids    Intake/Output Summary (Last 24 hours) at 8/20/2019 1720  Last data filed at 8/20/2019 1200  Gross per 24 hour   Intake 240 ml   Output --   Net 240 ml       Laboratory                        Imaging  US-RENAL   Final Result      No hydronephrosis.      DX-CHEST-2 VIEWS   Final Result         1. No significant interval change. No pulmonary infiltrates or consolidations are noted.      DX-CHEST-PORTABLE (1 VIEW)   Final Result      No acute cardiopulmonary abnormality.           Assessment/Plan  * Asthma with exacerbation- (present on admission)  Assessment & Plan  -nearly resolved, home tomorrow when nebulizer delivered  No evidence of acute infection except skin wounds noted  Continue  prednisone 40 mg daily, tolerating well, day#4, short taper at home  -BD's as outlined below  Duo nebs / Xopenex   02 to keep sats 88-92%  Needs outpatient PFTS to be done   Symbicort ordered for home regimen  -Pa lateral CXR shows B/L clear lungs      Breast wound- (present on admission)  Assessment & Plan  She has multiple wounds over breast and trunk seem self inflicted   -on oral augmentin, appears to be tolerating well  Wound care consult      Controlled type 2 diabetes mellitus without complication, without long-term current use of insulin ([facility])- (present on admission)  Assessment & Plan  SSI ordered  Hold home medications   accu checks   -monitor sugars closely while on steroids for respiratory issues  -lantus 10 units daily, BS's are acceptable at this time    Left ear pain- (present on admission)  Assessment & Plan  -no bilateral,no e/o active infection, monitor  -if worsens, consider CT scan to r/o mastoiditis    Optic neuritis- (present on admission)  Assessment & Plan  On celcept and chronic steroids     Chronic respiratory failure with hypoxia, on home oxygen therapy (HCC)- (present on admission)  Assessment & Plan  At baseline home 02 demand   PT evaluated and determined no acute needs    TONYA (obstructive sleep apnea)- (present on admission)  Assessment & Plan  Needs follow up for CPAP machine     Acquired hypothyroidism- (present on admission)  Assessment & Plan  continue home levothyroxine   TSH within normal limits     GERD (gastroesophageal reflux disease)- (present on admission)  Assessment & Plan  continue home pepcid    Peripheral neuropathy (CMS-[facility])- (present on admission)  Assessment & Plan  continued home lyrica and gabapentin     Schizophrenia ([facility])- (present on admission)  Assessment & Plan  Hx of, home medications resumed    PCOS (polycystic ovarian syndrome)- (present on admission)  Assessment & Plan  Hx of     Progressive focal motor weakness- (present on admission)  Assessment & Plan  Hx of  PT eval states no acute needs at this time    Anxiety- (present on admission)  Assessment & Plan  Continue home buspar and prozac       VTE prophylaxis: SCD's

## 2019-08-21 NOTE — DISCHARGE INSTRUCTIONS
Wear oxygen as prescribed.  Take all medications as prescribed.   Follow up with primary care Doctor in one week.      Discharge Instructions    Discharged to home by car with relative. Discharged via wheelchair, hospital escort: Yes.  Special equipment needed: Not Applicable    Be sure to schedule a follow-up appointment with your primary care doctor or any specialists as instructed.     Discharge Plan:   Diet Plan: Discussed  Activity Level: Discussed  Confirmed Follow up Appointment: Patient to Call and Schedule Appointment  Confirmed Symptoms Management: Discussed  Medication Reconciliation Updated: Yes  Influenza Vaccine Indication: Not indicated: Previously immunized this influenza season and > 8 years of age    I understand that a diet low in cholesterol, fat, and sodium is recommended for good health. Unless I have been given specific instructions below for another diet, I accept this instruction as my diet prescription.   Other diet: Cardiac, diabetic    Special Instructions: None    · Is patient discharged on Warfarin / Coumadin?   No     Depression / Suicide Risk    As you are discharged from this [facility] facility, it is important to learn how to keep safe from harming yourself.    Recognize the warning signs:  · Abrupt changes in personality, positive or negative- including increase in energy   · Giving away possessions  · Change in eating patterns- significant weight changes-  positive or negative  · Change in sleeping patterns- unable to sleep or sleeping all the time   · Unwillingness or inability to communicate  · Depression  · Unusual sadness, discouragement and loneliness  · Talk of wanting to die  · Neglect of personal appearance   · Rebelliousness- reckless behavior  · Withdrawal from people/activities they love  · Confusion- inability to concentrate     If you or a loved one observes any of these behaviors or has concerns about self-harm, here's what you can do:  · Talk about it- your  feelings and reasons for harming yourself  · Remove any means that you might use to hurt yourself (examples: pills, rope, extension cords, firearm)  · Get professional help from the community (Mental Health, Substance Abuse, psychological counseling)  · Do not be alone:Call your Safe Contact- someone whom you trust who will be there for you.  · Call your local CRISIS HOTLINE 293-6657 or 367-809-2470  · Call your local Children's Mobile Crisis Response Team [place] (182) 337-4662 or [employer]  · Call the toll free National Suicide Prevention Hotlines   · National Suicide Prevention Lifeline 702-160-VUYK (8596)  · [employer] Line Network 800-SUICIDE (121-1921)      Asthma, Adult    Asthma is a condition of the lungs in which the airways tighten and narrow. Asthma can make it hard to breathe. Asthma cannot be cured, but medicine and lifestyle changes can help control it. Asthma may be started (triggered) by:  · Animal skin flakes (dander).  · Dust.  · Cockroaches.  · Pollen.  · Mold.  · Smoke.  · Cleaning products.  · Hair sprays or aerosol sprays.  · Paint fumes or strong smells.  · Cold air, weather changes, and winds.  · Crying or laughing hard.  · Stress.  · Certain medicines or drugs.  · Foods, such as dried fruit, potato chips, and sparkling grape juice.  · Infections or conditions (colds, flu).  · Exercise.  · Certain medical conditions or diseases.  · Exercise or tiring activities.  Follow these instructions at home:  · Take medicine as told by your doctor.  · Use a peak flow meter as told by your doctor. A peak flow meter is a tool that measures how well the lungs are working.  · Record and keep track of the peak flow meter's readings.  · Understand and use the asthma action plan. An asthma action plan is a written plan for taking care of your asthma and treating your attacks.  · To help prevent asthma attacks:  ¨ Do not smoke. Stay away from secondhand smoke.  ¨ Change your heating and air  conditioning filter often.  ¨ Limit your use of fireplaces and wood stoves.  ¨ Get rid of pests (such as roaches and mice) and their droppings.  ¨ Throw away plants if you see mold on them.  ¨ Clean your floors. Dust regularly. Use cleaning products that do not smell.  ¨ Have someone vacuum when you are not home. Use a vacuum  with a HEPA filter if possible.  ¨ Replace carpet with wood, tile, or vinyl jodi. Carpet can trap animal skin flakes and dust.  ¨ Use allergy-proof pillows, mattress covers, and box spring covers.  ¨ Wash bed sheets and blankets every week in hot water and dry them in a dryer.  ¨ Use blankets that are made of polyester or cotton.  ¨ Clean bathrooms and sandi with bleach. If possible, have someone repaint the walls in these rooms with mold-resistant paint. Keep out of the rooms that are being cleaned and painted.  ¨ Wash hands often.  Contact a doctor if:  · You have make a whistling sound when breaking (wheeze), have shortness of breath, or have a cough even if taking medicine to prevent attacks.  · The colored mucus you cough up (sputum) is thicker than usual.  · The colored mucus you cough up changes from clear or white to yellow, green, gray, or bloody.  · You have problems from the medicine you are taking such as:  ¨ A rash.  ¨ Itching.  ¨ Swelling.  ¨ Trouble breathing.  · You need reliever medicines more than 2-3 times a week.  · Your peak flow measurement is still at 50-79% of your personal best after following the action plan for 1 hour.  · You have a fever.  Get help right away if:  · You seem to be worse and are not responding to medicine during an asthma attack.  · You are short of breath even at rest.  · You get short of breath when doing very little activity.  · You have trouble eating, drinking, or talking.  · You have chest pain.  · You have a fast heartbeat.  · Your lips or fingernails start to turn blue.  · You are light-headed, dizzy, or faint.  · Your peak flow  is less than 50% of your personal best.  This information is not intended to replace advice given to you by your health care provider. Make sure you discuss any questions you have with your health care provider.  Document Released: 06/05/2009 Document Revised: 05/25/2017 Document Reviewed: 07/17/2014  Elsevier Interactive Patient Education © 2017 Elsevier Inc.

## 2019-08-21 NOTE — THERAPY
"Occupational Therapy Treatment completed with focus on ADLs, ADL transfers and patient education.  Functional Status:  Pt seen for OT tx. Pt at this time is completing BADLs w/ supv. Pt reports at home she has help to assist w/ ADLs as needed. Pt however, reports that she feels below her baseline and feels weaker. Pt requiring increased seated rest breaks d/t decreased activity tolerance and SOB. Educated about energy conservation techniques. Pt verbalized different strategies that could assist her. She continues to demonstrate increased work of breathing and decreased activity tolerance during session requiring seated rest breaks between tasks impacting ability to complete tasks in a timely manner. Encouraged pt to continue getting up w/ nrsg staff as tolerated to increase strength and activity tolerance. Continues to be receptive to education.  Plan of Care: Pt has met current goals in acute care. Will discuss POC w/ OT regarding baseline, goals and frequency.   Discharge Recommendations:  Equipment No Equipment Needed. Post-acute therapy Discharge to home with outpatient or home health for additional skilled therapy services.    See \"Rehab Therapy-Acute\" Patient Summary Report for complete documentation.   "

## 2019-08-21 NOTE — DISCHARGE PLANNING
ATTN: Case Management  RE: Referral for Home Health    Reason for referral denial: Patient is unsafe per RN Supervisor              Unfortunately, we are not able to accept this referral for the reason listed above. If further clarity is needed, our Transitional Care Specialists are available to discuss any barriers to service at [phone].      We look forward to collaborating with you in the future,  [facility]

## 2019-08-21 NOTE — FACE TO FACE
Face to Face Supporting Documentation - Home Health    The encounter with this patient was in whole or in part the primary reason for home health admission.    Date of encounter:   Patient:                    MRN:                       YOB: 2020  Kristin Balderrama  [id number]  1990     Home health to see patient for:  Skilled Nursing care for assessment, interventions & education    Skilled need for:  Exacerbation of Chronic Disease State asthma    Skilled nursing interventions to include:  Comment: assessment and medication management    Homebound status evidenced by:  Need the aid of supportive devices such as crutches, canes, wheelchairs or walkers or Needs the assistance of another person in order to leave the home. Leaving home requires a considerable and taxing effort. There is a normal inability to leave the home.    Community Physician to provide follow up care: Torres Brody M.D.     Optional Interventions? No      I certify the face to face encounter for this home health care referral meets the CMS requirements and the encounter/clinical assessment with the patient was, in whole, or in part, for the medical condition(s) listed above, which is the primary reason for home health care. Based on my clinical findings: the service(s) are medically necessary, support the need for home health care, and the homebound criteria are met.  I certify that this patient has had a face to face encounter by myself.  PER [place]. - NPI: [id number]

## 2019-08-21 NOTE — DISCHARGE PLANNING
Received Choice form at [phone]  Agency/Facility Name: [facility]  Referral sent per Choice form @ [phone]

## 2019-08-21 NOTE — DISCHARGE PLANNING
Agency/Facility Name: [facility]  Spoke To: Khanh  Outcome: Order still not received. This CCA faxed order again at 9690.

## 2019-08-21 NOTE — PROGRESS NOTES
Doing well this am.  Medicated for pain per mar.  Pain to lower back, 6/10.  Ambulates with SBA, gait is steady.  O2 in place at 3lpm via NC, O2 sats remain greater than 90%.  Patient encouraged to increase PO intake of Water.  Fresh water pitcher provided to patient.  PIV patent, SL.  Skin intact.  POC discussed, pt agrees and verbalizes understanding.  Hourly rounding in place, call light is within reach.  Assessment completed as charted.

## 2019-08-22 ENCOUNTER — HOSPITAL ENCOUNTER (EMERGENCY)
Facility: MEDICAL CENTER | Age: 29
End: 2019-08-22
Attending: EMERGENCY MEDICINE
Payer: MEDICARE

## 2019-08-22 ENCOUNTER — APPOINTMENT (OUTPATIENT)
Dept: RADIOLOGY | Facility: MEDICAL CENTER | Age: 29
End: 2019-08-22
Attending: EMERGENCY MEDICINE
Payer: MEDICARE

## 2019-08-22 ENCOUNTER — PATIENT OUTREACH (OUTPATIENT)
Dept: HEALTH INFORMATION MANAGEMENT | Facility: OTHER | Age: 29
End: 2019-08-22

## 2019-08-22 VITALS
TEMPERATURE: 97.9 F | RESPIRATION RATE: 20 BRPM | DIASTOLIC BLOOD PRESSURE: 79 MMHG | OXYGEN SATURATION: 89 % | SYSTOLIC BLOOD PRESSURE: 149 MMHG | BODY MASS INDEX: 45.93 KG/M2 | WEIGHT: 276 LBS | HEART RATE: 99 BPM

## 2019-08-22 DIAGNOSIS — J45.41 MODERATE PERSISTENT ASTHMA WITH ACUTE EXACERBATION: ICD-10-CM

## 2019-08-22 PROCEDURE — 700111 HCHG RX REV CODE 636 W/ 250 OVERRIDE (IP): Performed by: EMERGENCY MEDICINE

## 2019-08-22 PROCEDURE — 94640 AIRWAY INHALATION TREATMENT: CPT

## 2019-08-22 PROCEDURE — 700101 HCHG RX REV CODE 250: Performed by: EMERGENCY MEDICINE

## 2019-08-22 PROCEDURE — 71045 X-RAY EXAM CHEST 1 VIEW: CPT

## 2019-08-22 PROCEDURE — 99284 EMERGENCY DEPT VISIT MOD MDM: CPT

## 2019-08-22 RX ORDER — PREDNISONE 20 MG/1
60 TABLET ORAL ONCE
Status: COMPLETED | OUTPATIENT
Start: 2019-08-22 | End: 2019-08-22

## 2019-08-22 RX ORDER — LEVALBUTEROL INHALATION SOLUTION 1.25 MG/3ML
1.25 SOLUTION RESPIRATORY (INHALATION)
Status: COMPLETED | OUTPATIENT
Start: 2019-08-22 | End: 2019-08-22

## 2019-08-22 RX ORDER — ALBUTEROL SULFATE 2.5 MG/3ML
2.5 SOLUTION RESPIRATORY (INHALATION) EVERY 4 HOURS PRN
Qty: 100 ML | Refills: 3 | Status: SHIPPED | OUTPATIENT
Start: 2019-08-22 | End: 2019-09-25

## 2019-08-22 RX ADMIN — LEVALBUTEROL 1.25 MG: 1.25 SOLUTION RESPIRATORY (INHALATION) at 11:21

## 2019-08-22 RX ADMIN — PREDNISONE 60 MG: 20 TABLET ORAL at 11:32

## 2019-08-22 NOTE — ED NOTES
Med rec complete per patient  allergies reviewed    Patient got new rx's from  8-21-19 but didn't pick any up but Symbicort and couldn't figure out how to use it

## 2019-08-22 NOTE — ED TRIAGE NOTES
Chief Complaint   Patient presents with   • Shortness of Breath     Pt BIB EMS for increased SOB/end expiratory wheezing noted. pt reports hx of asthma, 1x NPPB/Duo Neb given PTA.

## 2019-08-22 NOTE — PROGRESS NOTES
Discharge instructions and prescription given to patient.  Home medication returned to patient.  IV removed, patient tolerated well.  All questions answered at this time.  Transport called to take patient to car.  All personal belongings taken with patient.

## 2019-08-22 NOTE — ED NOTES
Pt given discharge instructions/prescription given/ home care instructions explained, pt verbalized understanding of instructions given/pt understands the importance of follow up, pt to [facility] morrow.

## 2019-08-22 NOTE — ED PROVIDER NOTES
ED Provider Note    Scribed for Emanuel Díaz M.D. by Brook Nunes. 8/22/2019  10:58 AM    Primary care provider: Torres Brody M.D.  Means of arrival: Ambulance  History obtained from: Patient  History limited by: None    CHIEF COMPLAINT  Chief Complaint   Patient presents with   • Shortness of Breath     Pt BIB EMS for increased SOB/end expiratory wheezing noted. pt reports hx of asthma, 1x NPPB/Duo Neb given PTA.       HPI  Kristin Balderrama is a 28 y.o. female with a history including but not limited to schizophrenia who presents to the Emergency Department via ambulance for evaluation of shortness of breath onset this morning. She states she was laying down when onset of shortness of breath occurred. She describes hearing audible wheezing and called EMS. The patient states she had two treatment of albuterol en route to the hospital with mild alleviation. Currently she shortness of breath is mildly improving, and wheezing has resolved. The patient endorses nausea, but denies any associated fever, chest pain, or vomiting.  The patient states she was discharged from [facility] ED yesterday, however symptoms of shortness of breath. She states she does not have nebulizer due to complications with insurance. She states she is currently taking 20 mg of Prednisone and is weaning down from 40 mg of Prednisone at the hospital. Patient notes she is able to tolerate PO/solids and liquids.       REVIEW OF SYSTEMS  Pertinent positives include shortness of breath, nausea. Pertinent negatives include no fever, chest pain, or vomiting.  All other systems reviewed and negative.    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    SURGICAL HISTORY   has a past surgical history that includes neuro dest facet l/s w/ig sngl (4/21/2015); recovery (1/27/2016); delmar by laparoscopy (8/29/2010); lumbar fusion anterior (8/21/2012); other cardiac surgery (1/2016); tonsillectomy; bowel stimulator insertion (7/13/2016); gastroscopy with balloon dilatation (N/A, 1/4/2017); muscle biopsy (Right, 1/26/2017); other abdominal surgery; and laminotomy.    SOCIAL HISTORY  Social History     Tobacco Use   • Smoking status: Never Smoker   • Smokeless tobacco: Never Used   Substance Use Topics   • Alcohol use: No     Alcohol/week: 0.0 oz   • Drug use: Yes     Frequency: 7.0 times per week     Types: Marijuana, Oral     Comment: Medicinal edible's      Social History     Substance and Sexual Activity   Drug Use Yes   • Frequency: 7.0 times per week   • Types: Marijuana, Oral    Comment: Medicinal edible's       FAMILY HISTORY  Family History   Problem Relation Age of Onset   • Hypertension Mother    • Sleep Apnea Mother    • Heart Disease Mother    • Other Mother         hypothryod   • Hypertension Maternal Uncle    • Heart Disease Maternal Grandmother    • Hypertension Maternal Grandmother    • No Known Problems Sister    • Other Sister         Narcolepsy;fibromyalsia;bone;nerve   • Genitourinary () Problems Sister         endometriosis       CURRENT  MEDICATIONS  Home Medications     Reviewed by Ulisses Alatorre (Pharmacy Tech) on 08/22/19 at 1240  Med List Status: Complete   Medication Last Dose Status   albuterol 108 (90 Base) MCG/ACT Aero Soln inhalation aerosol 8/22/2019 Active   aspirin EC (ECOTRIN) 81 MG Tablet Delayed Response 8/21/2019 Active   budesonide-formoterol (SYMBICORT) 160-4.5 MCG/ACT Aerosol NEW RX Active   busPIRone (BUSPAR) 5 MG tablet 8/21/2019 Active   Diclofenac Sodium 1 % Gel 8/21/2019 Active   Diphenhydramine-APAP, sleep, (TYLENOL PM EXTRA STRENGTH PO) 8/21/2019 Active   Dulaglutide (TRULICITY) 0.75 MG/0.5ML Solution Pen-injector 8/16/2019 Active   etonogestrel (NEXPLANON) 68 MG Implant implant CONT Active   FLUoxetine (PROZAC) 20 MG Cap 8/21/2019 Active   folic acid (FOLVITE) 1 MG Tab 8/21/2019 Active   furosemide (LASIX) 80 MG Tab 8/21/2019 Active   gabapentin (NEURONTIN) 300 MG Cap 8/21/2019 Active   HYDROcodone-acetaminophen (NORCO) 5-325 MG Tab per tablet NEW RX Active   ivabradine (CORLANOR) 5 MG Tab tablet  Active   levalbuterol (XOPENEX) 1.25 MG/3ML Nebu Soln NEW RX Active   levothyroxine (SYNTHROID) 75 MCG Tab 8/21/2019 Active   LYRICA 300 MG capsule 8/21/2019 Active   Melatonin 5 MG Tab 8/21/2019 Active   methocarbamol (ROBAXIN) 750 MG Tab 8/21/2019 Active   mycophenolate (CELLCEPT) 500 MG tablet 8/21/2019 Active   ondansetron (ZOFRAN) 4 MG Tab tablet 8/22/2019 Active   phenazopyridine (PYRIDIUM) 200 MG Tab 8/21/2019 Active   predniSONE (DELTASONE) 20 MG Tab 8/21/2019 Active   promethazine (PHENERGAN) 12.5 MG tablet NEW RX Active   ranitidine (ZANTAC) 300 MG tablet 8/21/2019 Active   sodium bicarbonate 325 MG Tab 8/21/2019 Active   traZODone (DESYREL) 100 MG Tab 8/21/2019 Active   ziprasidone (GEODON) 80 MG Cap 8/21/2019 Active                ALLERGIES  Allergies   Allergen Reactions   • Cefdinir Shortness of Breath and Itching     Tolerated 1/18/17  Tolerates ceftriaxone    • Depakote [Divalproex Sodium] Unspecified      "Muscle spasms/muscle pain and weakness     • Doxycycline Anaphylaxis and Vomiting     RXN=unknown   • Amitriptyline Unspecified     Headaches     • Aripiprazole [Abilify] Unspecified     Headaches/muscle twitching     • Ciprofloxacin Rash     Pt states \"I get a rash\".     • Clindamycin Nausea     Even with food     • Ees [Erythromycin] Vomiting and Nausea   • Flagyl [Metronidazole Hcl] Unspecified     \"eye problems\"     • Flomax [Tamsulosin Hydrochloride] Swelling   • Metformin Unspecified     Increased lactic acid      • Tape Rash     Tears skin off  coban with Tegaderm tape ok intermittently  RXN=ongoing   • Vancomycin Itching     Pt becomes flushed in face and chest.   RXN=7/10/16   • Wound Dressing Adhesive Hives     By pt report   • Cephalexin [Keflex] Rash     Pt states she gets a rash with this medication  Tolerates ceftriaxone   • Divalproex Sodium [Valproic Acid] Rash     .   • Levofloxacin Unspecified     Leg muscle cramps   • Metronidazole Rash     .   • Tamsulosin Hcl        PHYSICAL EXAM  VITAL SIGNS: /91   Pulse 92   Temp 36.6 °C (97.9 °F) (Temporal)   Resp (!) 26   Wt (!) 125.2 kg (276 lb)   LMP 08/01/2019   BMI 45.93 kg/m²     Constitutional: Well developed, Well nourished, Morbidly obese, Mild respiratory distress, Non-toxic appearance.   HENT: Normocephalic, Atraumatic, Bilateral external ears normal, Oropharynx moist, No oral exudates.   Eyes: PERRLA, EOMI, Conjunctiva normal, No discharge.   Neck: No tenderness, Supple, No stridor.   Lymphatic: No lymphadenopathy noted.   Cardiovascular: Normal heart rate, Normal rhythm.   Thorax & Lungs: Decreased breath sounds throughout. TachypneicClear to auscultation bilaterally, No wheezing, No crackles.   Abdomen: Abdomen obese, Soft, No tenderness, No masses, No pulsatile masses.   Skin: Warm, Dry, No erythema, No rash.   Extremities:, No edema No cyanosis.   Musculoskeletal: No tenderness to palpation or major deformities noted.  Intact " distal pulses  Neurologic: Awake and alert but slightly sleepy Moves all extremities spontaneously.  Psychiatric: Affect normal, Judgment normal, Mood normal.     LABS  Labs Reviewed - No data to display  All labs reviewed by me.      RADIOLOGY  DX-CHEST-PORTABLE (1 VIEW)   Final Result      No acute cardiopulmonary abnormality.        The radiologist's interpretation of all radiological studies have been reviewed by me.      COURSE & MEDICAL DECISION MAKING  Pertinent Labs & Imaging studies reviewed. (See chart for details)    I reviewed the patient's medical records which showed history of asthma. The patient was admitted 8 days ago and discharged yesterday. The patient has a history of schizophrenia.     10:58 AM - Patient seen and examined at bedside. Patient will be treated with Prednisone 60 mg and Xopenex 1.25 mg. Ordered DX chest to evaluate her symptoms.     Patient was reevaluated at bedside. The patient is resting comfortably in bed with stable vital signs. Patient feels improved after nebulizer treatment. Discussed radiology results with the patient and informed them that results were unremarkable. Chest xray did not indicate any evidence of pneumonia.The patient will be discharged home at this time with Albuterol prescription and advised to return to the ED for any new or worsening symptoms of shortness of breath. The patient is understanding and agreeable with discharge.      Decision Making:  Patient is coming in [place] to shortness of breath.  The patient just left the hospital yesterday secondary to an asthma exacerbation.  The patient is a extremely hard IV access.  The patient artery received 2 breathing treatments with moderate to severe improvement of the patient's breathing.  Give the patient another breathing treatment, increase the patient steroids, chest x-ray was negative, repeat evaluation shows the patient is feeling improved, wishes to be discharged home.  Patient return with worsening  symptoms, will increase her prednisone over the next few days.    The patient will return for new or worsening symptoms and is stable at the time of discharge. The patient is referred to a primary physician for blood pressure management, diabetic screening, and for all other preventative health concerns.      DISPOSITION:  Patient will be discharged home in stable condition.    FOLLOW UP:  [facility], Emergency Dept  [address] [place]  [place] [id number] [phone]    If symptoms worsen    Torres Brody M.D.  [address]  [facility] [id number] [phone]            OUTPATIENT MEDICATIONS:  Discharge Medication List as of 8/22/2019  1:10 PM      START taking these medications    Details   albuterol (PROVENTIL) 2.5mg/3ml Nebu Soln solution for nebulization 3 mL by Nebulization route every four hours as needed for Shortness of Breath., Disp-100 mL, R-3, Normal               FINAL IMPRESSION  1. Moderate persistent asthma with acute exacerbation          IBrook (Eva), am scribing for, and in the presence of, Emanuel Díaz M.D..    Electronically signed by: Brook Nunes (Eva), 8/22/2019    IEmanuel M.D. personally performed the services described in this documentation, as scribed by Brook Nunes in my presence, and it is both accurate and complete. C    The note accurately reflects work and decisions made by me.  Emanuel Díaz  8/22/2019  6:10 PM

## 2019-08-22 NOTE — FLOWSHEET NOTE
08/22/19 1121   Interdisciplinary Plan of Care-Goals (Indications)   Bronchodilator Indications Physical Exam / Hyperinflation / Wheezing (bronchospasm)   Interdisciplinary Plan of Care-Outcomes    Bronchodilator Outcome Improved Patient Appearance with Decreased use of Accessory Muscles   Education   Education Yes - Pt. / Family has been Instructed in use of Respiratory Medications and Adverse Reactions   RT Assessment of Delivered Medications   Evaluation of Medication Delivery Daily Yes-- Pt /Family has been Instructed in use of Respiratory Medications and Adverse Reactions   SVN Group   #SVN Performed Yes   Given By: Mask   Date SVN Last Changed 08/22/19   Date SVN Next Change Due (Q 7 Days) 08/29/19   Respiratory WDL   Respiratory (WDL) X   Chest Exam   Work Of Breathing / Effort Mild   Respiration 20   Pulse 93   Breath Sounds   Pre/Post Intervention Pre Intervention Assessment   RUL Breath Sounds Clear   RML Breath Sounds Clear;Diminished   RLL Breath Sounds Diminished   MARY Breath Sounds Clear   LLL Breath Sounds Diminished   Oximetry   Continuous Oximetry Yes   O2 Alarms Set & Reviewed Yes   Oxygen   Pulse Oximetry 93 %   O2 (LPM) 2   O2 Daily Delivery Respiratory  Silicone Nasal Cannula

## 2019-08-23 ENCOUNTER — OFFICE VISIT (OUTPATIENT)
Dept: MEDICAL GROUP | Facility: MEDICAL CENTER | Age: 29
End: 2019-08-23
Payer: MEDICARE

## 2019-08-23 VITALS
HEIGHT: 65 IN | OXYGEN SATURATION: 96 % | SYSTOLIC BLOOD PRESSURE: 120 MMHG | TEMPERATURE: 97 F | HEART RATE: 88 BPM | BODY MASS INDEX: 46.48 KG/M2 | DIASTOLIC BLOOD PRESSURE: 60 MMHG | WEIGHT: 279 LBS

## 2019-08-23 DIAGNOSIS — J45.41 MODERATE PERSISTENT ASTHMA WITH ACUTE EXACERBATION: ICD-10-CM

## 2019-08-23 DIAGNOSIS — E11.9 CONTROLLED TYPE 2 DIABETES MELLITUS WITHOUT COMPLICATION, WITHOUT LONG-TERM CURRENT USE OF INSULIN (HCC): ICD-10-CM

## 2019-08-23 PROCEDURE — 99214 OFFICE O/P EST MOD 30 MIN: CPT | Performed by: INTERNAL MEDICINE

## 2019-08-23 RX ORDER — BUDESONIDE AND FORMOTEROL FUMARATE DIHYDRATE 160; 4.5 UG/1; UG/1
2 AEROSOL RESPIRATORY (INHALATION) 2 TIMES DAILY
Qty: 1 INHALER | Refills: 11 | Status: SHIPPED | OUTPATIENT
Start: 2019-08-23 | End: 2019-11-05

## 2019-08-23 RX ORDER — BUDESONIDE AND FORMOTEROL FUMARATE DIHYDRATE 160; 4.5 UG/1; UG/1
2 AEROSOL RESPIRATORY (INHALATION) 2 TIMES DAILY
Qty: 1 INHALER | Refills: 0 | Status: SHIPPED | OUTPATIENT
Start: 2019-08-23 | End: 2019-08-23

## 2019-08-23 NOTE — PROGRESS NOTES
Subjective:         POST DISCHARGE CALL:  Discharge Date:8/21/2019   Date of Outreach Call: 8/22/2019  9:47 AM  Now that you're home, how are you doing? Fair  Comment:Pt reports she does get short of breath.  Discussed using nebulizer machine. Pt states she didn't pick  up medication for nebulizer. States insurance won't cover.  Pt will notify Pulm provider to see about alternative med.  Do you have questions about your medications? No    Did you fill your medications? No  Comment:Pt states she has her medications except  Xoponex. Pt will contact pulmonary for an alternative med     Do you have a follow-up appointment scheduled?Yes  Comment:PCP 8/23    Discharging Department: Orthopedics    Number of Attempts: 1  Current or previous attempts completed within two business days of discharge? Yes  Provided education regarding treatment plan, medication, self-management, ADLs? Yes  Has patient completed Advance Directive? If yes, advise them to bring to appointment. No  Care Manager phone number provided? Yes  Is there anything else I can help you with? No  Comment:Pt reports home care was denied.     Nurses outreach call note above reviewed.  Steroids    Have reviewed discharge summary as well as all imaging and labs performed while in the hospital.    CC: Follow-up hospitalization follow-up hospitalization asthma exacerbation.    HPI:   Kristin presents today with the following.    1. Moderate persistent asthma with acute exacerbation  Since after being hospitalized in the again seen in the emergency room yesterday for shortness of breath.  She was given a nebulizer treatment with significant improvement.  She is also been placed on long-acting preventative medication.  Steroids are still at 40 medic grams she is complaining of mental fog with them.    2. Controlled type 2 diabetes mellitus without complication, without long-term current use of insulin (HCC)  Blood sugars have gone up as a result of the steroid she is  compliant with her medications.      Patient Active Problem List    Diagnosis Date Noted   • Left leg weakness 07/14/2018     Priority: High   • Breast wound 11/06/2017     Priority: High   • Controlled type 2 diabetes mellitus without complication, without long-term current use of insulin ([facility]) 04/26/2017     Priority: High   • Sinus tachycardia 10/31/2013     Priority: High   • Morbid obesity with BMI of 45.0-49.9, adult ([facility]) 10/24/2017     Priority: Low   • Depression 10/28/2016     Priority: Low   • Schizophrenia ([facility]) 10/27/2016     Priority: Low   • PCOS (polycystic ovarian syndrome) 11/23/2015     Priority: Low   • Progressive focal motor weakness 06/28/2015     Priority: Low   • Fatty liver disease, nonalcoholic 01/19/2015     Priority: Low   • Anxiety 12/16/2014     Priority: Low   • Knee pain, right 02/13/2014     Priority: Low   • Neurogenic bladder 04/02/2011     Priority: Low   • Borderline personality disorder in adult ([facility]) 09/18/2010     Priority: Low   • Left ear pain 08/17/2019   • Asthma with exacerbation 08/14/2019   • Moderate intermittent asthma without complication 06/20/2019   • Optic neuritis 05/27/2019   • Inappropriate sinus tachycardia 04/10/2019   • Palpitations 10/01/2018   • Chronic respiratory failure with hypoxia, on home oxygen therapy ([facility]) 08/08/2018   • Transaminitis 08/08/2018   • TONYA (obstructive sleep apnea) 01/09/2018   • Functional diarrhea 01/05/2018   • Intractable episodic cluster headache 09/14/2017   • Acquired hypothyroidism 08/04/2017   • Hashimoto's encephalopathy 05/17/2017   • On home oxygen therapy 04/15/2017   • Weakness of right upper extremity 02/23/2017   • Hypovitaminosis D 11/29/2016   • Chronic pain syndrome 10/27/2016   • Bowel and bladder incontinence 10/27/2016   • Galactorrhea 07/22/2016   • Elevated sedimentation rate 06/27/2016   • Weakness of both lower extremities 06/22/2016   • Vitamin D deficiency 05/21/2016   • Morbidly obese ([facility]) 03/07/2016    • Scoliosis 03/07/2016   • GERD (gastroesophageal reflux disease) 03/07/2016   • Peripheral neuropathy (CMS-HCC) 03/06/2016   • H/O prior ablation treatment 02/10/2016       Current Outpatient Medications   Medication Sig Dispense Refill   • budesonide-formoterol (SYMBICORT) 160-4.5 MCG/ACT Aerosol Inhale 2 Puffs by mouth 2 Times a Day. 1 Inhaler 11   • NON SPECIFIED Spacer for MDI 1 Each 0   • albuterol (PROVENTIL) 2.5mg/3ml Nebu Soln solution for nebulization 3 mL by Nebulization route every four hours as needed for Shortness of Breath. 100 mL 3   • promethazine (PHENERGAN) 12.5 MG tablet Take 1-2 Tabs by mouth every four hours as needed for Nausea/Vomiting (if no relief from ondansetron or if ondansetron is not ordered) for up to 5 days. 30 Tab 0   • HYDROcodone-acetaminophen (NORCO) 5-325 MG Tab per tablet Take 1-2 Tabs by mouth every 6 hours as needed for up to 3 days. 20 Tab 0   • predniSONE (DELTASONE) 20 MG Tab Take 20 mg by mouth every day.     • etonogestrel (NEXPLANON) 68 MG Implant implant Inject 1 Each as instructed Once.     • FLUoxetine (PROZAC) 20 MG Cap Take 1 Cap by mouth every day. 30 Cap 2   • mycophenolate (CELLCEPT) 500 MG tablet Take 500-1,000 mg by mouth 2 times a day. 500 mg in AM and 1000 mg at HS     • levothyroxine (SYNTHROID) 75 MCG Tab Take 1 Tab by mouth Every morning on an empty stomach. 90 Tab 1   • gabapentin (NEURONTIN) 300 MG Cap Take 300 mg by mouth 4 times a day.     • Diclofenac Sodium 1 % Gel Apply 1 Application to skin as directed 4 times a day. Apply's on wrist, back, ankles, and feet.     • Dulaglutide (TRULICITY) 0.75 MG/0.5ML Solution Pen-injector Inject 0.75 mg as instructed every Friday.     • ranitidine (ZANTAC) 300 MG tablet Take 1 Tab by mouth every day. 60 Tab 3   • ondansetron (ZOFRAN) 4 MG Tab tablet Take 1 Tab by mouth every four hours as needed for Nausea/Vomiting. 20 Tab 3   • folic acid (FOLVITE) 1 MG Tab Take 1 Tab by mouth every day. 30 Tab 2   •  ivabradine (CORLANOR) 5 MG Tab tablet Take 1.5 Tabs by mouth 2 times a day, with meals. 60 Tab 1   • sodium bicarbonate 325 MG Tab Take 325-650 mg by mouth 3 times a day. 650 mg in AM  325 mg mid-afternoon  650 mg at night     • albuterol 108 (90 Base) MCG/ACT Aero Soln inhalation aerosol Inhale 2 Puffs by mouth every 6 hours as needed for Shortness of Breath.     • Melatonin 5 MG Tab Take 10 mg by mouth every day.     • furosemide (LASIX) 80 MG Tab Take 80 mg by mouth every day.     • busPIRone (BUSPAR) 5 MG tablet TAKE ONE TABLET BY MOUTH TWICE DAILY 60 Tab 4   • phenazopyridine (PYRIDIUM) 200 MG Tab Take 1 Tab by mouth 3 times a day as needed. 30 Tab 2   • ziprasidone (GEODON) 80 MG Cap Take 80 mg by mouth 2 Times a Day.     • methocarbamol (ROBAXIN) 750 MG Tab Take 750 mg by mouth 3 times a day.     • traZODone (DESYREL) 100 MG Tab TAKE  ONE TABLET BY MOUTH NIGHTLY AT BEDTIME 90 Tab 2   • Diphenhydramine-APAP, sleep, (TYLENOL PM EXTRA STRENGTH PO) Take 2 Caps by mouth every day.     • LYRICA 300 MG capsule Take 300 mg by mouth 2 times a day.     • aspirin EC (ECOTRIN) 81 MG Tablet Delayed Response Take 1 Tab by mouth every day. 30 Tab 6     No current facility-administered medications for this visit.          Allergies as of 08/23/2019 - Reviewed 08/23/2019   Allergen Reaction Noted   • Cefdinir Shortness of Breath and Itching 03/01/2016   • Depakote [divalproex sodium] Unspecified 06/14/2010   • Doxycycline Anaphylaxis and Vomiting 08/15/2012   • Amitriptyline Unspecified 10/31/2013   • Aripiprazole [abilify] Unspecified 01/17/2013   • Ciprofloxacin Rash 12/17/2009   • Clindamycin Nausea 02/02/2011   • Ees [erythromycin] Vomiting and Nausea 08/28/2010   • Flagyl [metronidazole hcl] Unspecified 03/31/2011   • Flomax [tamsulosin hydrochloride] Swelling 09/24/2009   • Metformin Unspecified 07/23/2013   • Tape Rash 08/15/2012   • Vancomycin Itching 07/10/2016   • Wound dressing adhesive Hives 01/12/2018   •  "Cephalexin [keflex] Rash 01/01/2017   • Divalproex sodium [valproic acid] Rash 03/30/2017   • Levofloxacin Unspecified 10/27/2016   • Metronidazole Rash 03/30/2017   • Tamsulosin hcl  09/19/2010        ROS: Denies Chest pain, orthopnea, LE edema.    /60 (BP Location: Left arm, Patient Position: Sitting)   Pulse 88   Temp 36.1 °C (97 °F)   Ht 1.651 m (5' 5\")   Wt (!) 126.6 kg (279 lb)   LMP 08/01/2019   SpO2 96%   BMI 46.43 kg/m²     Physical Exam:  Gen:         Alert and oriented, No apparent distress.  Neck:        No Lymphadenopathy or Bruits.  Lungs:     Clear to auscultation bilaterally  CV:          Regular rate and rhythm. No murmurs, rubs or gallops.               Ext:          No clubbing, cyanosis, edema.      Assessment and Plan.   28 y.o. female with the following issues.    1. Moderate persistent asthma with acute exacerbation  Have added Symbicort with a spacer as her technique is probably somewhat lacking she is not wheezing today continue to titrate on steroids back to baseline dosage.    2. Controlled type 2 diabetes mellitus without complication, without long-term current use of insulin (HCC)  No change to therapy continue to monitor          - Hospitalization and results reviewed with patient.   - Medications reviewed including instructions regarding high risk medications, dosing and side effects.      "

## 2019-08-24 ENCOUNTER — APPOINTMENT (OUTPATIENT)
Dept: RADIOLOGY | Facility: MEDICAL CENTER | Age: 29
End: 2019-08-24
Attending: EMERGENCY MEDICINE
Payer: MEDICARE

## 2019-08-24 ENCOUNTER — HOSPITAL ENCOUNTER (EMERGENCY)
Facility: MEDICAL CENTER | Age: 29
End: 2019-08-24
Attending: EMERGENCY MEDICINE
Payer: MEDICARE

## 2019-08-24 VITALS
WEIGHT: 279 LBS | OXYGEN SATURATION: 97 % | SYSTOLIC BLOOD PRESSURE: 125 MMHG | TEMPERATURE: 98.1 F | BODY MASS INDEX: 46.43 KG/M2 | DIASTOLIC BLOOD PRESSURE: 56 MMHG | HEART RATE: 91 BPM | RESPIRATION RATE: 18 BRPM

## 2019-08-24 DIAGNOSIS — J45.909 MODERATE ASTHMA WITHOUT COMPLICATION, UNSPECIFIED WHETHER PERSISTENT: ICD-10-CM

## 2019-08-24 LAB
ALBUMIN SERPL BCP-MCNC: 4 G/DL (ref 3.2–4.9)
ALBUMIN/GLOB SERPL: 2 G/DL
ALP SERPL-CCNC: 31 U/L (ref 30–99)
ALT SERPL-CCNC: 41 U/L (ref 2–50)
ANION GAP SERPL CALC-SCNC: 11 MMOL/L (ref 0–11.9)
AST SERPL-CCNC: 22 U/L (ref 12–45)
BASOPHILS # BLD AUTO: 0 % (ref 0–1.8)
BASOPHILS # BLD: 0 K/UL (ref 0–0.12)
BILIRUB SERPL-MCNC: 0.3 MG/DL (ref 0.1–1.5)
BUN SERPL-MCNC: 21 MG/DL (ref 8–22)
CALCIUM SERPL-MCNC: 9.2 MG/DL (ref 8.5–10.5)
CHLORIDE SERPL-SCNC: 103 MMOL/L (ref 96–112)
CO2 SERPL-SCNC: 22 MMOL/L (ref 20–33)
CREAT SERPL-MCNC: 0.97 MG/DL (ref 0.5–1.4)
EOSINOPHIL # BLD AUTO: 0 K/UL (ref 0–0.51)
EOSINOPHIL NFR BLD: 0 % (ref 0–6.9)
ERYTHROCYTE [DISTWIDTH] IN BLOOD BY AUTOMATED COUNT: 48.4 FL (ref 35.9–50)
GLOBULIN SER CALC-MCNC: 2 G/DL (ref 1.9–3.5)
GLUCOSE SERPL-MCNC: 281 MG/DL (ref 65–99)
HCT VFR BLD AUTO: 41.2 % (ref 37–47)
HGB BLD-MCNC: 13.2 G/DL (ref 12–16)
LYMPHOCYTES # BLD AUTO: 1.22 K/UL (ref 1–4.8)
LYMPHOCYTES NFR BLD: 21.1 % (ref 22–41)
MANUAL DIFF BLD: NORMAL
MCH RBC QN AUTO: 31.9 PG (ref 27–33)
MCHC RBC AUTO-ENTMCNC: 32 G/DL (ref 33.6–35)
MCV RBC AUTO: 99.5 FL (ref 81.4–97.8)
MONOCYTES # BLD AUTO: 0.2 K/UL (ref 0–0.85)
MONOCYTES NFR BLD AUTO: 3.5 % (ref 0–13.4)
MORPHOLOGY BLD-IMP: NORMAL
NEUTROPHILS # BLD AUTO: 4.37 K/UL (ref 2–7.15)
NEUTROPHILS NFR BLD: 75.4 % (ref 44–72)
NRBC # BLD AUTO: 0 K/UL
NRBC BLD-RTO: 0 /100 WBC
PLATELET # BLD AUTO: 148 K/UL (ref 164–446)
PMV BLD AUTO: 11.7 FL (ref 9–12.9)
POTASSIUM SERPL-SCNC: 3.9 MMOL/L (ref 3.6–5.5)
PROT SERPL-MCNC: 6 G/DL (ref 6–8.2)
RBC # BLD AUTO: 4.14 M/UL (ref 4.2–5.4)
RBC BLD AUTO: NORMAL
SODIUM SERPL-SCNC: 136 MMOL/L (ref 135–145)
WBC # BLD AUTO: 5.8 K/UL (ref 4.8–10.8)

## 2019-08-24 PROCEDURE — 94640 AIRWAY INHALATION TREATMENT: CPT

## 2019-08-24 PROCEDURE — 80053 COMPREHEN METABOLIC PANEL: CPT

## 2019-08-24 PROCEDURE — 700101 HCHG RX REV CODE 250: Performed by: EMERGENCY MEDICINE

## 2019-08-24 PROCEDURE — 71045 X-RAY EXAM CHEST 1 VIEW: CPT

## 2019-08-24 PROCEDURE — A9270 NON-COVERED ITEM OR SERVICE: HCPCS | Performed by: EMERGENCY MEDICINE

## 2019-08-24 PROCEDURE — 700111 HCHG RX REV CODE 636 W/ 250 OVERRIDE (IP): Performed by: EMERGENCY MEDICINE

## 2019-08-24 PROCEDURE — 85027 COMPLETE CBC AUTOMATED: CPT

## 2019-08-24 PROCEDURE — 700102 HCHG RX REV CODE 250 W/ 637 OVERRIDE(OP): Performed by: EMERGENCY MEDICINE

## 2019-08-24 PROCEDURE — 96374 THER/PROPH/DIAG INJ IV PUSH: CPT

## 2019-08-24 PROCEDURE — 85007 BL SMEAR W/DIFF WBC COUNT: CPT

## 2019-08-24 PROCEDURE — 99285 EMERGENCY DEPT VISIT HI MDM: CPT

## 2019-08-24 RX ORDER — OXYCODONE AND ACETAMINOPHEN 10; 325 MG/1; MG/1
1 TABLET ORAL ONCE
Status: COMPLETED | OUTPATIENT
Start: 2019-08-24 | End: 2019-08-24

## 2019-08-24 RX ORDER — IPRATROPIUM BROMIDE AND ALBUTEROL SULFATE 2.5; .5 MG/3ML; MG/3ML
3 SOLUTION RESPIRATORY (INHALATION) EVERY 6 HOURS PRN
Qty: 60 BULLET | Refills: 1 | Status: ON HOLD | OUTPATIENT
Start: 2019-08-24 | End: 2020-09-14

## 2019-08-24 RX ORDER — METHYLPREDNISOLONE SODIUM SUCCINATE 125 MG/2ML
125 INJECTION, POWDER, LYOPHILIZED, FOR SOLUTION INTRAMUSCULAR; INTRAVENOUS ONCE
Status: COMPLETED | OUTPATIENT
Start: 2019-08-24 | End: 2019-08-24

## 2019-08-24 RX ADMIN — ALBUTEROL SULFATE 2.5 MG: 2.5 SOLUTION RESPIRATORY (INHALATION) at 20:02

## 2019-08-24 RX ADMIN — METHYLPREDNISOLONE SODIUM SUCCINATE 125 MG: 125 INJECTION, POWDER, FOR SOLUTION INTRAMUSCULAR; INTRAVENOUS at 20:00

## 2019-08-24 RX ADMIN — OXYCODONE HYDROCHLORIDE AND ACETAMINOPHEN 1 TABLET: 10; 325 TABLET ORAL at 22:01

## 2019-08-24 RX ADMIN — IPRATROPIUM BROMIDE 0.5 MG: 0.5 SOLUTION RESPIRATORY (INHALATION) at 20:02

## 2019-08-25 NOTE — ED NOTES
Pt states she is ready to go.  Discharge paperwork provided.  Pt wheeled out of er in [facility].

## 2019-08-25 NOTE — ED PROVIDER NOTES
"ED Provider Note    CHIEF COMPLAINT  Chief Complaint   Patient presents with   • Shortness of Breath       HPI  Kristin Balderrama is a 28 y.o. female who presents to emerge from today with exacerbation worsening of her asthma.  Patient was seen here several days ago for the same thing she was placed on prednisone and nebulized treatments.  She states that she does not understand any triggers other than she does have a new cat at home may be causing worsening of her asthma she had some wheezing which did not respond to breathing treatment.  Denies chest pain no fever chills no changes to bowel or bladder.  Symptoms occurred earlier today at home the context of normal activities.    REVIEW OF SYSTEMS  See HPI for further details. All other systems are negative.     PAST MEDICAL HISTORY  Past Medical History:   Diagnosis Date   • Sinus tachycardia 10/31/2013   • Pain 08-15-12    back, 7/10   • Pneumonia 2012   • Chronic UTI 9/18/2010   • Abdominal pain    • Anginal syndrome     random chest pain especially with tachycardia   • Apnea, sleep    • Arrhythmia     \"sinus tachycardia\", cariologist, Dr. Kumar; ablation 2/2016   • Arthritis     osteo   • ASTHMA     when around smoke   • Atrial fibrillation (HCC)    • Back pain    • Borderline personality disorder (HCC)    • Breath shortness     with tachycardia   • Bronchitis    • Cardiac arrhythmia    • Chickenpox    • Cough    • Daytime sleepiness    • Depression    • Diabetes (HCC)    • Diarrhea    • Disorder of thyroid    • Fall    • Fatigue    • Frequent headaches    • Gasping for breath    • Gynecological disorder     PCOS   • Headache(784.0)    • Heart burn    • History of falling    • Hypertension    • Indigestion    • Migraine    • 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HCC)    • 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HCC)    • Nausea    • Obesity    • Painful joint    • PCOS (polycystic ovarian syndrome)    • Psychosis (HCC)    • Renal disorder     \"kidney disease, stage 1\" nephrologist, " "Dr. 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   • Urinary bladder disorder     Suprapubic cath   • Urinary incontinence    • Weakness    • Wears glasses        FAMILY HISTORY  [unfilled]    SOCIAL HISTORY  Social History     Socioeconomic History   • Marital status: Single     Spouse name: Not on file   • Number of children: Not on file   • Years of education: Not on file   • Highest education level: Not on file   Occupational History   • Not on file   Social Needs   • Financial resource strain: Not on file   • Food insecurity:     Worry: Not on file     Inability: Not on file   • Transportation needs:     Medical: Not on file     Non-medical: Not on file   Tobacco Use   • Smoking status: Never Smoker   • Smokeless tobacco: Never Used   Substance and Sexual Activity   • Alcohol use: No     Alcohol/week: 0.0 oz   • Drug use: Yes     Frequency: 7.0 times per week     Types: Marijuana, Oral     Comment: Medicinal edible's   • Sexual activity: Not Currently     Birth control/protection: Pill   Lifestyle   • Physical activity:     Days per week: Not on file     Minutes per session: Not on file   • Stress: Not on file   Relationships   • Social connections:     Talks on phone: Not on file     Gets together: Not on file     Attends Taoist service: Not on file     Active member of club or organization: Not on file     Attends meetings of clubs or organizations: Not on file     Relationship status: Not on file   • Intimate partner violence:     Fear of current or ex partner: Not on file     Emotionally abused: Not on file     Physically abused: Not on file     Forced sexual activity: Not on file   Other Topics Concern   • Not on file   Social History Narrative    ** Merged History Encounter **            SURGICAL HISTORY  Past Surgical " History:   Procedure Laterality Date   • MUSCLE BIOPSY Right 1/26/2017    Procedure: MUSCLE BIOPSY - THIGH;  Surgeon: Isidro Vigil M.D.;  Location: SURGERY [facility];  Service:    • GASTROSCOPY WITH BALLOON DILATATION N/A 1/4/2017    Procedure: GASTROSCOPY WITH DILATATION;  Surgeon: Torres Vargas M.D.;  Location: SURGERY [facility];  Service:    • BOWEL STIMULATOR INSERTION  7/13/2016    Procedure: BOWEL STIMULATOR INSERTION FOR PERMANENT INTERSTIM SACRAL IMPLANT;  Surgeon: Joe Noyola M.D.;  Location: SURGERY [facility];  Service:    • RECOVERY  1/27/2016    Procedure: CATH LAB EP STUDY WITH SINUS NODE MODIFICATION LA;  Surgeon: Recoveryonly Surgery;  Location: SURGERY PRE-POST PROC UNIT [facility];  Service:    • OTHER CARDIAC SURGERY  1/2016    cardiac ablation   • NEURO DEST FACET L/S W/IG SNGL  4/21/2015    Performed by Reza Tabor at [facility]   • LUMBAR FUSION ANTERIOR  8/21/2012    Performed by SUSIE SAWANT at SURGERY [facility]   • ALYSSA BY LAPAROSCOPY  8/29/2010    Performed by SHAYY JOHNS at [facility]   • LAMINOTOMY     • OTHER ABDOMINAL SURGERY     • TONSILLECTOMY      tonsillectomy       CURRENT MEDICATIONS  Home Medications     Reviewed by Bertha Santiago R.N. (Registered Nurse) on 08/24/19 at 1857  Med List Status: Partial   Medication Last Dose Status   albuterol (PROVENTIL) 2.5mg/3ml Nebu Soln solution for nebulization  Active   albuterol 108 (90 Base) MCG/ACT Aero Soln inhalation aerosol  Active   aspirin EC (ECOTRIN) 81 MG Tablet Delayed Response  Active   budesonide-formoterol (SYMBICORT) 160-4.5 MCG/ACT Aerosol  Active   busPIRone (BUSPAR) 5 MG tablet  Active   Diclofenac Sodium 1 % Gel  Active   Diphenhydramine-APAP, sleep, (TYLENOL PM EXTRA STRENGTH PO)  Active   Dulaglutide (TRULICITY) 0.75 MG/0.5ML Solution Pen-injector  Active   etonogestrel (NEXPLANON) 68 MG Implant implant  Active   FLUoxetine (PROZAC) 20 MG Cap  Active  "  folic acid (FOLVITE) 1 MG Tab  Active   furosemide (LASIX) 80 MG Tab  Active   gabapentin (NEURONTIN) 300 MG Cap  Active   HYDROcodone-acetaminophen (NORCO) 5-325 MG Tab per tablet  Active   ivabradine (CORLANOR) 5 MG Tab tablet  Active   levothyroxine (SYNTHROID) 75 MCG Tab  Active   LYRICA 300 MG capsule  Active   Melatonin 5 MG Tab  Active   methocarbamol (ROBAXIN) 750 MG Tab  Active   mycophenolate (CELLCEPT) 500 MG tablet  Active   NON SPECIFIED  Active   ondansetron (ZOFRAN) 4 MG Tab tablet  Active   phenazopyridine (PYRIDIUM) 200 MG Tab  Active   predniSONE (DELTASONE) 20 MG Tab  Active   promethazine (PHENERGAN) 12.5 MG tablet  Active   ranitidine (ZANTAC) 300 MG tablet  Active   sodium bicarbonate 325 MG Tab  Active   traZODone (DESYREL) 100 MG Tab  Active   ziprasidone (GEODON) 80 MG Cap  Active                ALLERGIES  Allergies   Allergen Reactions   • Cefdinir Shortness of Breath and Itching     Tolerated 1/18/17  Tolerates ceftriaxone    • Depakote [Divalproex Sodium] Unspecified     Muscle spasms/muscle pain and weakness     • Doxycycline Anaphylaxis and Vomiting     RXN=unknown   • Amitriptyline Unspecified     Headaches     • Aripiprazole [Abilify] Unspecified     Headaches/muscle twitching     • Ciprofloxacin Rash     Pt states \"I get a rash\".     • Clindamycin Nausea     Even with food     • Ees [Erythromycin] Vomiting and Nausea   • Flagyl [Metronidazole Hcl] Unspecified     \"eye problems\"     • Flomax [Tamsulosin Hydrochloride] Swelling   • Metformin Unspecified     Increased lactic acid      • Tape Rash     Tears skin off  coban with Tegaderm tape ok intermittently  RXN=ongoing   • Vancomycin Itching     Pt becomes flushed in face and chest.   RXN=7/10/16   • Wound Dressing Adhesive Hives     By pt report   • Cephalexin [Keflex] Rash     Pt states she gets a rash with this medication  Tolerates ceftriaxone   • Divalproex Sodium [Valproic Acid] Rash     .   • Levofloxacin Unspecified     Leg " muscle cramps   • Metronidazole Rash     .   • Tamsulosin Hcl        PHYSICAL EXAM  VITAL SIGNS: /56   Pulse 91   Temp 36.7 °C (98.1 °F) (Temporal)   Resp 18   Wt (!) 126.6 kg (279 lb)   LMP 08/01/2019   SpO2 97%   BMI 46.43 kg/m²       Constitutional: Well developed, Well nourished,  acute distress, Non-toxic appearance.   HENT: Normocephalic, Atraumatic, Bilateral external ears normal, Oropharynx moist, No oral exudates, Nose normal.   Eyes: PERRLA, EOMI, Conjunctiva normal, No discharge.   Neck: Normal range of motion, No tenderness, Supple, No stridor.   Lymphatic: No lymphadenopathy noted.   Cardiovascular: Normal heart rate, Normal rhythm, No murmurs, No rubs, No gallops.   Thorax & Lungs: End expiratory wheezing which cleared with treatment, No respiratory distress, wheezing, No chest tenderness.   Abdomen: Bowel sounds normal, Soft, No tenderness, No masses, No pulsatile masses.   Skin: Warm, Dry, No erythema, No rash.   Back: No tenderness, No CVA tenderness.   Extremities: Intact distal pulses, No edema, No tenderness, No cyanosis, No clubbing.   Musculoskeletal: Good range of motion in all major joints. No tenderness to palpation or major deformities noted.   Neurologic: Alert & oriented x 3, Normal motor function, Normal sensory function, No focal deficits noted.   Psychiatric: Affect normal, Judgment normal, Mood normal.     RADIOLOGY/PROCEDURES  DX-CHEST-PORTABLE (1 VIEW)   Final Result      No acute cardiopulmonary abnormality. No change.            COURSE & MEDICAL DECISION MAKING  Pertinent Labs & Imaging studies reviewed. (See chart for details)  Patient's white count elevated but she is recently placed on prednisone may be effect of the steroid.  Chest x-ray show no evidence of any acute process.  Patient was given albuterol/Atrovent with resolution of her symptoms and given a dose of Solu-Medrol IV push.  Replaced albuterol with duo nebulizer/albuterol/Atrovent for home continue on  her steroid taper discussed having cat removed from her house as this may be a trigger we will follow-up with her primary care physician return if further problems discharged in stable and improved condition as above to home with patient and her mother verbalized above instructions need for follow-up.    FINAL IMPRESSION  1.  Acute exacerbation of asthma  2.   3.         Electronically signed by: Armand Mcgill, 8/25/2019 2:31 AM

## 2019-08-25 NOTE — ED NOTES
Pt brought in by [facility] from home for asthma attack. Pt c/o SOB. Albuterol neb treatment at home x 4 prior to EMS arrival. Pt was admitted here last week for same symptoms, d/c home, returned back to ED on 08/22, d/c home and is now back again for same symptoms.     Pt was given 2 duo nebs PTA. Pt able to speak in 4-5 word sentences. A/o x4.

## 2019-08-29 ENCOUNTER — APPOINTMENT (OUTPATIENT)
Dept: RADIOLOGY | Facility: MEDICAL CENTER | Age: 29
DRG: 202 | End: 2019-08-29
Attending: EMERGENCY MEDICINE
Payer: MEDICARE

## 2019-08-29 ENCOUNTER — HOSPITAL ENCOUNTER (INPATIENT)
Facility: MEDICAL CENTER | Age: 29
LOS: 4 days | DRG: 202 | End: 2019-09-02
Attending: EMERGENCY MEDICINE | Admitting: INTERNAL MEDICINE
Payer: MEDICARE

## 2019-08-29 DIAGNOSIS — E87.20 LACTIC ACIDOSIS: ICD-10-CM

## 2019-08-29 DIAGNOSIS — N39.0 URINARY TRACT INFECTION WITHOUT HEMATURIA, SITE UNSPECIFIED: ICD-10-CM

## 2019-08-29 DIAGNOSIS — J45.41 MODERATE PERSISTENT ASTHMA WITH ACUTE EXACERBATION: ICD-10-CM

## 2019-08-29 PROBLEM — E11.65 UNCONTROLLED TYPE 2 DIABETES MELLITUS WITH HYPERGLYCEMIA (HCC): Status: ACTIVE | Noted: 2019-08-29

## 2019-08-29 LAB
ALBUMIN SERPL BCP-MCNC: 4.4 G/DL (ref 3.2–4.9)
ALBUMIN/GLOB SERPL: 2.1 G/DL
ALP SERPL-CCNC: 33 U/L (ref 30–99)
ALT SERPL-CCNC: 49 U/L (ref 2–50)
ANION GAP SERPL CALC-SCNC: 15 MMOL/L (ref 0–11.9)
APPEARANCE UR: ABNORMAL
APTT PPP: 23.2 SEC (ref 24.7–36)
AST SERPL-CCNC: 15 U/L (ref 12–45)
BACTERIA #/AREA URNS HPF: ABNORMAL /HPF
BASOPHILS # BLD AUTO: 0.2 % (ref 0–1.8)
BASOPHILS # BLD: 0.02 K/UL (ref 0–0.12)
BILIRUB SERPL-MCNC: 0.4 MG/DL (ref 0.1–1.5)
BILIRUB UR QL STRIP.AUTO: NEGATIVE
BUN SERPL-MCNC: 21 MG/DL (ref 8–22)
CALCIUM SERPL-MCNC: 9.1 MG/DL (ref 8.5–10.5)
CHLORIDE SERPL-SCNC: 109 MMOL/L (ref 96–112)
CO2 SERPL-SCNC: 15 MMOL/L (ref 20–33)
COLOR UR: YELLOW
CREAT SERPL-MCNC: 1.02 MG/DL (ref 0.5–1.4)
EOSINOPHIL # BLD AUTO: 0.01 K/UL (ref 0–0.51)
EOSINOPHIL NFR BLD: 0.1 % (ref 0–6.9)
EPI CELLS #/AREA URNS HPF: NEGATIVE /HPF
ERYTHROCYTE [DISTWIDTH] IN BLOOD BY AUTOMATED COUNT: 47.5 FL (ref 35.9–50)
EST. AVERAGE GLUCOSE BLD GHB EST-MCNC: 126 MG/DL
GLOBULIN SER CALC-MCNC: 2.1 G/DL (ref 1.9–3.5)
GLUCOSE BLD-MCNC: 177 MG/DL (ref 65–99)
GLUCOSE SERPL-MCNC: 232 MG/DL (ref 65–99)
GLUCOSE UR STRIP.AUTO-MCNC: 250 MG/DL
HBA1C MFR BLD: 6 % (ref 0–5.6)
HCT VFR BLD AUTO: 44 % (ref 37–47)
HGB BLD-MCNC: 14.2 G/DL (ref 12–16)
HYALINE CASTS #/AREA URNS LPF: ABNORMAL /LPF
IMM GRANULOCYTES # BLD AUTO: 0.14 K/UL (ref 0–0.11)
IMM GRANULOCYTES NFR BLD AUTO: 1.4 % (ref 0–0.9)
INR PPP: 0.94 (ref 0.87–1.13)
INR PPP: 0.95 (ref 0.87–1.13)
KETONES UR STRIP.AUTO-MCNC: 15 MG/DL
LACTATE BLD-SCNC: 2.9 MMOL/L (ref 0.5–2)
LACTATE BLD-SCNC: 3 MMOL/L (ref 0.5–2)
LEUKOCYTE ESTERASE UR QL STRIP.AUTO: ABNORMAL
LYMPHOCYTES # BLD AUTO: 1.16 K/UL (ref 1–4.8)
LYMPHOCYTES NFR BLD: 11.9 % (ref 22–41)
MCH RBC QN AUTO: 31.6 PG (ref 27–33)
MCHC RBC AUTO-ENTMCNC: 32.3 G/DL (ref 33.6–35)
MCV RBC AUTO: 97.8 FL (ref 81.4–97.8)
MICRO URNS: ABNORMAL
MONOCYTES # BLD AUTO: 0.22 K/UL (ref 0–0.85)
MONOCYTES NFR BLD AUTO: 2.2 % (ref 0–13.4)
NEUTROPHILS # BLD AUTO: 8.23 K/UL (ref 2–7.15)
NEUTROPHILS NFR BLD: 84.2 % (ref 44–72)
NITRITE UR QL STRIP.AUTO: NEGATIVE
NRBC # BLD AUTO: 0 K/UL
NRBC BLD-RTO: 0 /100 WBC
NT-PROBNP SERPL IA-MCNC: 18 PG/ML (ref 0–125)
PH UR STRIP.AUTO: 7 [PH] (ref 5–8)
PLATELET # BLD AUTO: 145 K/UL (ref 164–446)
PMV BLD AUTO: 11 FL (ref 9–12.9)
POTASSIUM SERPL-SCNC: 3.8 MMOL/L (ref 3.6–5.5)
PROT SERPL-MCNC: 6.5 G/DL (ref 6–8.2)
PROT UR QL STRIP: NEGATIVE MG/DL
PROTHROMBIN TIME: 12.7 SEC (ref 12–14.6)
PROTHROMBIN TIME: 12.9 SEC (ref 12–14.6)
RBC # BLD AUTO: 4.5 M/UL (ref 4.2–5.4)
RBC # URNS HPF: ABNORMAL /HPF
RBC UR QL AUTO: NEGATIVE
SODIUM SERPL-SCNC: 139 MMOL/L (ref 135–145)
SP GR UR STRIP.AUTO: 1.03
TROPONIN T SERPL-MCNC: <6 NG/L (ref 6–19)
TSH SERPL DL<=0.005 MIU/L-ACNC: 1.18 UIU/ML (ref 0.38–5.33)
UROBILINOGEN UR STRIP.AUTO-MCNC: 0.2 MG/DL
WBC # BLD AUTO: 9.8 K/UL (ref 4.8–10.8)
WBC #/AREA URNS HPF: ABNORMAL /HPF

## 2019-08-29 PROCEDURE — 700111 HCHG RX REV CODE 636 W/ 250 OVERRIDE (IP): Performed by: INTERNAL MEDICINE

## 2019-08-29 PROCEDURE — 85610 PROTHROMBIN TIME: CPT

## 2019-08-29 PROCEDURE — 84443 ASSAY THYROID STIM HORMONE: CPT

## 2019-08-29 PROCEDURE — 87040 BLOOD CULTURE FOR BACTERIA: CPT

## 2019-08-29 PROCEDURE — A9270 NON-COVERED ITEM OR SERVICE: HCPCS | Performed by: INTERNAL MEDICINE

## 2019-08-29 PROCEDURE — 99285 EMERGENCY DEPT VISIT HI MDM: CPT

## 2019-08-29 PROCEDURE — 87077 CULTURE AEROBIC IDENTIFY: CPT | Mod: 91

## 2019-08-29 PROCEDURE — 700105 HCHG RX REV CODE 258: Performed by: EMERGENCY MEDICINE

## 2019-08-29 PROCEDURE — 99223 1ST HOSP IP/OBS HIGH 75: CPT | Performed by: INTERNAL MEDICINE

## 2019-08-29 PROCEDURE — 81001 URINALYSIS AUTO W/SCOPE: CPT

## 2019-08-29 PROCEDURE — 87086 URINE CULTURE/COLONY COUNT: CPT

## 2019-08-29 PROCEDURE — 700101 HCHG RX REV CODE 250

## 2019-08-29 PROCEDURE — 83036 HEMOGLOBIN GLYCOSYLATED A1C: CPT

## 2019-08-29 PROCEDURE — 770006 HCHG ROOM/CARE - MED/SURG/GYN SEMI*

## 2019-08-29 PROCEDURE — 700102 HCHG RX REV CODE 250 W/ 637 OVERRIDE(OP): Performed by: INTERNAL MEDICINE

## 2019-08-29 PROCEDURE — 84484 ASSAY OF TROPONIN QUANT: CPT

## 2019-08-29 PROCEDURE — 85730 THROMBOPLASTIN TIME PARTIAL: CPT

## 2019-08-29 PROCEDURE — 94640 AIRWAY INHALATION TREATMENT: CPT

## 2019-08-29 PROCEDURE — 83605 ASSAY OF LACTIC ACID: CPT

## 2019-08-29 PROCEDURE — 82962 GLUCOSE BLOOD TEST: CPT

## 2019-08-29 PROCEDURE — 36415 COLL VENOUS BLD VENIPUNCTURE: CPT

## 2019-08-29 PROCEDURE — 94760 N-INVAS EAR/PLS OXIMETRY 1: CPT

## 2019-08-29 PROCEDURE — 80053 COMPREHEN METABOLIC PANEL: CPT

## 2019-08-29 PROCEDURE — 304561 HCHG STAT O2

## 2019-08-29 PROCEDURE — 85025 COMPLETE CBC W/AUTO DIFF WBC: CPT

## 2019-08-29 PROCEDURE — 87186 SC STD MICRODIL/AGAR DIL: CPT

## 2019-08-29 PROCEDURE — 83880 ASSAY OF NATRIURETIC PEPTIDE: CPT

## 2019-08-29 PROCEDURE — 71045 X-RAY EXAM CHEST 1 VIEW: CPT

## 2019-08-29 RX ORDER — ONDANSETRON 4 MG/1
4 TABLET, ORALLY DISINTEGRATING ORAL EVERY 4 HOURS PRN
Status: DISCONTINUED | OUTPATIENT
Start: 2019-08-29 | End: 2019-09-02 | Stop reason: HOSPADM

## 2019-08-29 RX ORDER — GABAPENTIN 300 MG/1
300 CAPSULE ORAL 4 TIMES DAILY
Status: DISCONTINUED | OUTPATIENT
Start: 2019-08-29 | End: 2019-09-02 | Stop reason: HOSPADM

## 2019-08-29 RX ORDER — NITROFURANTOIN 25; 75 MG/1; MG/1
100 CAPSULE ORAL 2 TIMES DAILY WITH MEALS
Status: DISCONTINUED | OUTPATIENT
Start: 2019-08-29 | End: 2019-08-30

## 2019-08-29 RX ORDER — UREA 10 %
800 LOTION (ML) TOPICAL DAILY
COMMUNITY
End: 2020-02-24

## 2019-08-29 RX ORDER — FAMOTIDINE 20 MG/1
20 TABLET, FILM COATED ORAL 2 TIMES DAILY
Status: DISCONTINUED | OUTPATIENT
Start: 2019-08-30 | End: 2019-09-02 | Stop reason: HOSPADM

## 2019-08-29 RX ORDER — DOXYCYCLINE 100 MG/1
100 TABLET ORAL EVERY 12 HOURS
Status: DISCONTINUED | OUTPATIENT
Start: 2019-08-29 | End: 2019-08-29

## 2019-08-29 RX ORDER — FOLIC ACID 1 MG/1
1 TABLET ORAL DAILY
Status: DISCONTINUED | OUTPATIENT
Start: 2019-08-30 | End: 2019-09-02 | Stop reason: HOSPADM

## 2019-08-29 RX ORDER — CHOLECALCIFEROL (VITAMIN D3) 125 MCG
10 CAPSULE ORAL DAILY
Status: DISCONTINUED | OUTPATIENT
Start: 2019-08-30 | End: 2019-08-29

## 2019-08-29 RX ORDER — FUROSEMIDE 40 MG/1
80 TABLET ORAL DAILY
Status: DISCONTINUED | OUTPATIENT
Start: 2019-08-30 | End: 2019-09-02 | Stop reason: HOSPADM

## 2019-08-29 RX ORDER — SODIUM CHLORIDE, SODIUM LACTATE, POTASSIUM CHLORIDE, AND CALCIUM CHLORIDE .6; .31; .03; .02 G/100ML; G/100ML; G/100ML; G/100ML
500 INJECTION, SOLUTION INTRAVENOUS
Status: DISCONTINUED | OUTPATIENT
Start: 2019-08-29 | End: 2019-09-02 | Stop reason: HOSPADM

## 2019-08-29 RX ORDER — LEVOTHYROXINE SODIUM 0.07 MG/1
75 TABLET ORAL
Status: DISCONTINUED | OUTPATIENT
Start: 2019-08-30 | End: 2019-09-02 | Stop reason: HOSPADM

## 2019-08-29 RX ORDER — SODIUM BICARBONATE 650 MG/1
650 TABLET ORAL 2 TIMES DAILY
Status: DISCONTINUED | OUTPATIENT
Start: 2019-08-29 | End: 2019-09-02 | Stop reason: HOSPADM

## 2019-08-29 RX ORDER — IPRATROPIUM BROMIDE AND ALBUTEROL SULFATE 2.5; .5 MG/3ML; MG/3ML
3 SOLUTION RESPIRATORY (INHALATION)
Status: DISCONTINUED | OUTPATIENT
Start: 2019-08-29 | End: 2019-09-02 | Stop reason: HOSPADM

## 2019-08-29 RX ORDER — SODIUM CHLORIDE, SODIUM LACTATE, POTASSIUM CHLORIDE, AND CALCIUM CHLORIDE .6; .31; .03; .02 G/100ML; G/100ML; G/100ML; G/100ML
1000 INJECTION, SOLUTION INTRAVENOUS ONCE
Status: COMPLETED | OUTPATIENT
Start: 2019-08-29 | End: 2019-08-29

## 2019-08-29 RX ORDER — ZIPRASIDONE HYDROCHLORIDE 80 MG/1
80 CAPSULE ORAL 2 TIMES DAILY WITH MEALS
Status: DISCONTINUED | OUTPATIENT
Start: 2019-08-30 | End: 2019-09-02 | Stop reason: HOSPADM

## 2019-08-29 RX ORDER — AMOXICILLIN 250 MG
2 CAPSULE ORAL 2 TIMES DAILY
Status: DISCONTINUED | OUTPATIENT
Start: 2019-08-29 | End: 2019-09-02 | Stop reason: HOSPADM

## 2019-08-29 RX ORDER — ACETAMINOPHEN 325 MG/1
650 TABLET ORAL EVERY 4 HOURS PRN
Status: DISCONTINUED | OUTPATIENT
Start: 2019-08-29 | End: 2019-09-02 | Stop reason: HOSPADM

## 2019-08-29 RX ORDER — METHYLPREDNISOLONE SODIUM SUCCINATE 40 MG/ML
40 INJECTION, POWDER, LYOPHILIZED, FOR SOLUTION INTRAMUSCULAR; INTRAVENOUS EVERY 6 HOURS
Status: DISCONTINUED | OUTPATIENT
Start: 2019-08-29 | End: 2019-08-29

## 2019-08-29 RX ORDER — TRAZODONE HYDROCHLORIDE 50 MG/1
100 TABLET ORAL
Status: DISCONTINUED | OUTPATIENT
Start: 2019-08-29 | End: 2019-09-02 | Stop reason: HOSPADM

## 2019-08-29 RX ORDER — BISACODYL 10 MG
10 SUPPOSITORY, RECTAL RECTAL
Status: DISCONTINUED | OUTPATIENT
Start: 2019-08-29 | End: 2019-09-02 | Stop reason: HOSPADM

## 2019-08-29 RX ORDER — RANITIDINE 300 MG/1
300 TABLET ORAL DAILY
Status: DISCONTINUED | OUTPATIENT
Start: 2019-08-30 | End: 2019-08-29

## 2019-08-29 RX ORDER — LACTOBACILLUS RHAMNOSUS GG 10B CELL
1 CAPSULE ORAL
Status: DISCONTINUED | OUTPATIENT
Start: 2019-08-30 | End: 2019-09-02 | Stop reason: HOSPADM

## 2019-08-29 RX ORDER — INSULIN GLARGINE 100 [IU]/ML
10 INJECTION, SOLUTION SUBCUTANEOUS EVERY EVENING
Status: DISCONTINUED | OUTPATIENT
Start: 2019-08-29 | End: 2019-09-02 | Stop reason: HOSPADM

## 2019-08-29 RX ORDER — POLYETHYLENE GLYCOL 3350 17 G/17G
1 POWDER, FOR SOLUTION ORAL
Status: DISCONTINUED | OUTPATIENT
Start: 2019-08-29 | End: 2019-09-02 | Stop reason: HOSPADM

## 2019-08-29 RX ORDER — SODIUM BICARBONATE 325 MG/1
325 TABLET ORAL
Status: DISCONTINUED | OUTPATIENT
Start: 2019-08-30 | End: 2019-09-02 | Stop reason: HOSPADM

## 2019-08-29 RX ORDER — ALBUTEROL SULFATE 90 UG/1
2 AEROSOL, METERED RESPIRATORY (INHALATION) EVERY 6 HOURS PRN
Status: DISCONTINUED | OUTPATIENT
Start: 2019-08-29 | End: 2019-08-29

## 2019-08-29 RX ORDER — IPRATROPIUM BROMIDE AND ALBUTEROL SULFATE 2.5; .5 MG/3ML; MG/3ML
3 SOLUTION RESPIRATORY (INHALATION)
Status: DISCONTINUED | OUTPATIENT
Start: 2019-08-29 | End: 2019-08-31

## 2019-08-29 RX ORDER — METHOCARBAMOL 500 MG/1
750 TABLET, FILM COATED ORAL 3 TIMES DAILY
Status: DISCONTINUED | OUTPATIENT
Start: 2019-08-29 | End: 2019-09-02 | Stop reason: HOSPADM

## 2019-08-29 RX ORDER — BUSPIRONE HYDROCHLORIDE 10 MG/1
5 TABLET ORAL 2 TIMES DAILY
Status: DISCONTINUED | OUTPATIENT
Start: 2019-08-29 | End: 2019-09-02 | Stop reason: HOSPADM

## 2019-08-29 RX ORDER — METHYLPREDNISOLONE SODIUM SUCCINATE 125 MG/2ML
125 INJECTION, POWDER, LYOPHILIZED, FOR SOLUTION INTRAMUSCULAR; INTRAVENOUS EVERY 6 HOURS
Status: DISPENSED | OUTPATIENT
Start: 2019-08-29 | End: 2019-09-01

## 2019-08-29 RX ORDER — BUDESONIDE AND FORMOTEROL FUMARATE DIHYDRATE 160; 4.5 UG/1; UG/1
2 AEROSOL RESPIRATORY (INHALATION)
Status: DISCONTINUED | OUTPATIENT
Start: 2019-08-29 | End: 2019-09-02 | Stop reason: HOSPADM

## 2019-08-29 RX ORDER — PREGABALIN 150 MG/1
300 CAPSULE ORAL 2 TIMES DAILY
Status: DISCONTINUED | OUTPATIENT
Start: 2019-08-29 | End: 2019-09-02 | Stop reason: HOSPADM

## 2019-08-29 RX ORDER — MYCOPHENOLATE MOFETIL 250 MG/1
1000 CAPSULE ORAL 2 TIMES DAILY
Status: DISCONTINUED | OUTPATIENT
Start: 2019-08-29 | End: 2019-09-02 | Stop reason: HOSPADM

## 2019-08-29 RX ORDER — GUAIFENESIN/DEXTROMETHORPHAN 100-10MG/5
10 SYRUP ORAL EVERY 6 HOURS PRN
Status: DISCONTINUED | OUTPATIENT
Start: 2019-08-29 | End: 2019-09-02 | Stop reason: HOSPADM

## 2019-08-29 RX ORDER — FLUOXETINE HYDROCHLORIDE 20 MG/1
20 CAPSULE ORAL DAILY
Status: DISCONTINUED | OUTPATIENT
Start: 2019-08-30 | End: 2019-09-02 | Stop reason: HOSPADM

## 2019-08-29 RX ADMIN — NITROFURANTOIN MONOHYDRATE/MACROCRYSTALLINE 100 MG: 25; 75 CAPSULE ORAL at 23:35

## 2019-08-29 RX ADMIN — ACETAMINOPHEN 650 MG: 325 TABLET, FILM COATED ORAL at 23:35

## 2019-08-29 RX ADMIN — SODIUM CHLORIDE, POTASSIUM CHLORIDE, SODIUM LACTATE AND CALCIUM CHLORIDE 1000 ML: 600; 310; 30; 20 INJECTION, SOLUTION INTRAVENOUS at 17:12

## 2019-08-29 RX ADMIN — PREGABALIN 300 MG: 150 CAPSULE ORAL at 22:33

## 2019-08-29 RX ADMIN — IPRATROPIUM BROMIDE AND ALBUTEROL SULFATE 3 ML: .5; 3 SOLUTION RESPIRATORY (INHALATION) at 20:17

## 2019-08-29 RX ADMIN — INSULIN HUMAN 1 UNITS: 100 INJECTION, SOLUTION PARENTERAL at 22:40

## 2019-08-29 RX ADMIN — METHYLPREDNISOLONE SODIUM SUCCINATE 125 MG: 125 INJECTION, POWDER, FOR SOLUTION INTRAMUSCULAR; INTRAVENOUS at 22:32

## 2019-08-29 RX ADMIN — IVABRADINE 7.5 MG: 5 TABLET, FILM COATED ORAL at 23:35

## 2019-08-29 RX ADMIN — TRAZODONE HYDROCHLORIDE 100 MG: 50 TABLET ORAL at 23:35

## 2019-08-29 RX ADMIN — METHOCARBAMOL TABLETS 750 MG: 750 TABLET, COATED ORAL at 22:32

## 2019-08-29 RX ADMIN — BUSPIRONE HYDROCHLORIDE 5 MG: 10 TABLET ORAL at 23:35

## 2019-08-29 RX ADMIN — SODIUM BICARBONATE 650 MG: 650 TABLET ORAL at 22:34

## 2019-08-29 RX ADMIN — MYCOPHENOLATE MOFETIL 1000 MG: 250 CAPSULE ORAL at 22:33

## 2019-08-29 RX ADMIN — IPRATROPIUM BROMIDE AND ALBUTEROL SULFATE 3 ML: .5; 3 SOLUTION RESPIRATORY (INHALATION) at 22:54

## 2019-08-29 RX ADMIN — GABAPENTIN 300 MG: 300 CAPSULE ORAL at 22:33

## 2019-08-29 ASSESSMENT — COGNITIVE AND FUNCTIONAL STATUS - GENERAL
DRESSING REGULAR LOWER BODY CLOTHING: A LOT
CLIMB 3 TO 5 STEPS WITH RAILING: A LOT
SUGGESTED CMS G CODE MODIFIER MOBILITY: CL
DRESSING REGULAR UPPER BODY CLOTHING: A LOT
STANDING UP FROM CHAIR USING ARMS: A LOT
SUGGESTED CMS G CODE MODIFIER DAILY ACTIVITY: CL
MOVING FROM LYING ON BACK TO SITTING ON SIDE OF FLAT BED: A LOT
EATING MEALS: A LOT
DAILY ACTIVITIY SCORE: 12
TOILETING: A LOT
MOBILITY SCORE: 12
TURNING FROM BACK TO SIDE WHILE IN FLAT BAD: A LOT
MOVING TO AND FROM BED TO CHAIR: A LOT
HELP NEEDED FOR BATHING: A LOT
PERSONAL GROOMING: A LOT
WALKING IN HOSPITAL ROOM: A LOT

## 2019-08-29 ASSESSMENT — ENCOUNTER SYMPTOMS
EYE PAIN: 0
HEMOPTYSIS: 0
PALPITATIONS: 0
HEADACHES: 0
EYE REDNESS: 0
SHORTNESS OF BREATH: 1
NERVOUS/ANXIOUS: 0
VOMITING: 0
DIZZINESS: 0
INSOMNIA: 0
FALLS: 0
TREMORS: 0
DIARRHEA: 0
SPUTUM PRODUCTION: 1
CHILLS: 0
WEAKNESS: 0
FOCAL WEAKNESS: 0
ABDOMINAL PAIN: 0
BLOOD IN STOOL: 0
LOSS OF CONSCIOUSNESS: 0
CONSTIPATION: 0
FEVER: 0
NAUSEA: 0
SEIZURES: 0
WHEEZING: 1
MYALGIAS: 0
COUGH: 1

## 2019-08-29 ASSESSMENT — LIFESTYLE VARIABLES
HAVE PEOPLE ANNOYED YOU BY CRITICIZING YOUR DRINKING: NO
EVER_SMOKED: NEVER
EVER FELT BAD OR GUILTY ABOUT YOUR DRINKING: NO
EVER_SMOKED: NEVER
TOTAL SCORE: 0
AVERAGE NUMBER OF DAYS PER WEEK YOU HAVE A DRINK CONTAINING ALCOHOL: 0
TOTAL SCORE: 0
TOTAL SCORE: 0
CONSUMPTION TOTAL: NEGATIVE
ON A TYPICAL DAY WHEN YOU DRINK ALCOHOL HOW MANY DRINKS DO YOU HAVE: 0
HOW MANY TIMES IN THE PAST YEAR HAVE YOU HAD 5 OR MORE DRINKS IN A DAY: 0
HAVE YOU EVER FELT YOU SHOULD CUT DOWN ON YOUR DRINKING: NO
EVER HAD A DRINK FIRST THING IN THE MORNING TO STEADY YOUR NERVES TO GET RID OF A HANGOVER: NO
ALCOHOL_USE: NO

## 2019-08-29 ASSESSMENT — COPD QUESTIONNAIRES
DURING THE PAST 4 WEEKS HOW MUCH DID YOU FEEL SHORT OF BREATH: SOME OF THE TIME
COPD SCREENING SCORE: 3
DO YOU EVER COUGH UP ANY MUCUS OR PHLEGM?: YES, A FEW DAYS A WEEK OR MONTH
HAVE YOU SMOKED AT LEAST 100 CIGARETTES IN YOUR ENTIRE LIFE: NO/DON'T KNOW

## 2019-08-29 NOTE — ED TRIAGE NOTES
"Pt arrived via EMS with asthma exacerbation. Pt administered duo-nebs and albuterol prior to calling EMS with no improvement. Pt given duonebx2, 2g magnesium, 125mg solu-medrol, albuterol x2 in route via EMS with minimal improvement. Pt states her lungs feel \"more open\". Pt was recently hospitalized for similar symptoms.     Pt states she has been coughing up brown phlem for the past week.     Pt audibly wheezing with labored breathing. Breathing TX in process from EMS. Patient is alert and oriented x3.   "

## 2019-08-29 NOTE — ED NOTES
Pt ambulatory with one assist to restroom. Pt relays that her urine has been odorus. ED provider notified

## 2019-08-30 ENCOUNTER — APPOINTMENT (OUTPATIENT)
Dept: RADIOLOGY | Facility: MEDICAL CENTER | Age: 29
DRG: 202 | End: 2019-08-30
Attending: HOSPITALIST
Payer: MEDICARE

## 2019-08-30 PROBLEM — E87.20 METABOLIC ACIDOSIS: Status: ACTIVE | Noted: 2019-08-30

## 2019-08-30 PROBLEM — J96.20 ACUTE AND CHRONIC RESPIRATORY FAILURE (ACUTE-ON-CHRONIC) (HCC): Status: ACTIVE | Noted: 2019-08-30

## 2019-08-30 PROBLEM — J96.21 ACUTE ON CHRONIC RESPIRATORY FAILURE WITH HYPOXIA (HCC): Status: ACTIVE | Noted: 2019-08-30

## 2019-08-30 LAB
ANION GAP SERPL CALC-SCNC: 13 MMOL/L (ref 0–11.9)
ANION GAP SERPL CALC-SCNC: 16 MMOL/L (ref 0–11.9)
BASE EXCESS BLDA CALC-SCNC: -9 MMOL/L (ref -4–3)
BODY TEMPERATURE: ABNORMAL CENTIGRADE
BUN SERPL-MCNC: 12 MG/DL (ref 8–22)
BUN SERPL-MCNC: 14 MG/DL (ref 8–22)
CALCIUM SERPL-MCNC: 9.2 MG/DL (ref 8.5–10.5)
CALCIUM SERPL-MCNC: 9.2 MG/DL (ref 8.5–10.5)
CHLORIDE SERPL-SCNC: 106 MMOL/L (ref 96–112)
CHLORIDE SERPL-SCNC: 106 MMOL/L (ref 96–112)
CO2 SERPL-SCNC: 13 MMOL/L (ref 20–33)
CO2 SERPL-SCNC: 16 MMOL/L (ref 20–33)
CREAT SERPL-MCNC: 0.71 MG/DL (ref 0.5–1.4)
CREAT SERPL-MCNC: 0.74 MG/DL (ref 0.5–1.4)
ERYTHROCYTE [DISTWIDTH] IN BLOOD BY AUTOMATED COUNT: 46.8 FL (ref 35.9–50)
GLUCOSE BLD-MCNC: 184 MG/DL (ref 65–99)
GLUCOSE BLD-MCNC: 188 MG/DL (ref 65–99)
GLUCOSE BLD-MCNC: 211 MG/DL (ref 65–99)
GLUCOSE BLD-MCNC: 223 MG/DL (ref 65–99)
GLUCOSE SERPL-MCNC: 178 MG/DL (ref 65–99)
GLUCOSE SERPL-MCNC: 197 MG/DL (ref 65–99)
HCO3 BLDA-SCNC: 13 MMOL/L (ref 17–25)
HCT VFR BLD AUTO: 38.8 % (ref 37–47)
HGB BLD-MCNC: 12.8 G/DL (ref 12–16)
LACTATE BLD-SCNC: 3 MMOL/L (ref 0.5–2)
LACTATE BLD-SCNC: 4.1 MMOL/L (ref 0.5–2)
LACTATE BLD-SCNC: 4.4 MMOL/L (ref 0.5–2)
MCH RBC QN AUTO: 32.2 PG (ref 27–33)
MCHC RBC AUTO-ENTMCNC: 33 G/DL (ref 33.6–35)
MCV RBC AUTO: 97.7 FL (ref 81.4–97.8)
PCO2 BLDA: 19.2 MMHG (ref 26–37)
PH BLDA: 7.44 [PH] (ref 7.4–7.5)
PHOSPHATE SERPL-MCNC: 2.2 MG/DL (ref 2.5–4.5)
PLATELET # BLD AUTO: 150 K/UL (ref 164–446)
PMV BLD AUTO: 11.1 FL (ref 9–12.9)
PO2 BLDA: 159.6 MMHG (ref 64–87)
POTASSIUM SERPL-SCNC: 4.4 MMOL/L (ref 3.6–5.5)
POTASSIUM SERPL-SCNC: 4.5 MMOL/L (ref 3.6–5.5)
PROCALCITONIN SERPL-MCNC: <0.05 NG/ML
RBC # BLD AUTO: 3.97 M/UL (ref 4.2–5.4)
SAO2 % BLDA: 98.7 % (ref 93–99)
SODIUM SERPL-SCNC: 135 MMOL/L (ref 135–145)
SODIUM SERPL-SCNC: 135 MMOL/L (ref 135–145)
WBC # BLD AUTO: 12 K/UL (ref 4.8–10.8)

## 2019-08-30 PROCEDURE — 83605 ASSAY OF LACTIC ACID: CPT | Mod: 91

## 2019-08-30 PROCEDURE — 82962 GLUCOSE BLOOD TEST: CPT | Mod: 91

## 2019-08-30 PROCEDURE — 94760 N-INVAS EAR/PLS OXIMETRY 1: CPT

## 2019-08-30 PROCEDURE — 700102 HCHG RX REV CODE 250 W/ 637 OVERRIDE(OP): Performed by: INTERNAL MEDICINE

## 2019-08-30 PROCEDURE — 80048 BASIC METABOLIC PNL TOTAL CA: CPT

## 2019-08-30 PROCEDURE — 82803 BLOOD GASES ANY COMBINATION: CPT

## 2019-08-30 PROCEDURE — 84145 PROCALCITONIN (PCT): CPT

## 2019-08-30 PROCEDURE — 85027 COMPLETE CBC AUTOMATED: CPT

## 2019-08-30 PROCEDURE — 700105 HCHG RX REV CODE 258: Performed by: HOSPITALIST

## 2019-08-30 PROCEDURE — 99221 1ST HOSP IP/OBS SF/LOW 40: CPT | Performed by: INTERNAL MEDICINE

## 2019-08-30 PROCEDURE — 700101 HCHG RX REV CODE 250

## 2019-08-30 PROCEDURE — 94640 AIRWAY INHALATION TREATMENT: CPT

## 2019-08-30 PROCEDURE — 36415 COLL VENOUS BLD VENIPUNCTURE: CPT

## 2019-08-30 PROCEDURE — 700111 HCHG RX REV CODE 636 W/ 250 OVERRIDE (IP): Performed by: HOSPITALIST

## 2019-08-30 PROCEDURE — 700102 HCHG RX REV CODE 250 W/ 637 OVERRIDE(OP): Performed by: HOSPITALIST

## 2019-08-30 PROCEDURE — A9270 NON-COVERED ITEM OR SERVICE: HCPCS | Performed by: HOSPITALIST

## 2019-08-30 PROCEDURE — 71045 X-RAY EXAM CHEST 1 VIEW: CPT

## 2019-08-30 PROCEDURE — 700111 HCHG RX REV CODE 636 W/ 250 OVERRIDE (IP): Performed by: INTERNAL MEDICINE

## 2019-08-30 PROCEDURE — 84100 ASSAY OF PHOSPHORUS: CPT

## 2019-08-30 PROCEDURE — 99291 CRITICAL CARE FIRST HOUR: CPT | Performed by: HOSPITALIST

## 2019-08-30 PROCEDURE — 5A09357 ASSISTANCE WITH RESPIRATORY VENTILATION, LESS THAN 24 CONSECUTIVE HOURS, CONTINUOUS POSITIVE AIRWAY PRESSURE: ICD-10-PCS | Performed by: INTERNAL MEDICINE

## 2019-08-30 PROCEDURE — 94660 CPAP INITIATION&MGMT: CPT

## 2019-08-30 PROCEDURE — 770020 HCHG ROOM/CARE - TELE (206)

## 2019-08-30 PROCEDURE — A9270 NON-COVERED ITEM OR SERVICE: HCPCS | Performed by: INTERNAL MEDICINE

## 2019-08-30 RX ORDER — AZITHROMYCIN 250 MG/1
500 TABLET, FILM COATED ORAL DAILY
Status: COMPLETED | OUTPATIENT
Start: 2019-08-30 | End: 2019-09-01

## 2019-08-30 RX ORDER — SODIUM CHLORIDE 9 MG/ML
500 INJECTION, SOLUTION INTRAVENOUS ONCE
Status: COMPLETED | OUTPATIENT
Start: 2019-08-30 | End: 2019-08-30

## 2019-08-30 RX ORDER — MAGNESIUM SULFATE HEPTAHYDRATE 40 MG/ML
2 INJECTION, SOLUTION INTRAVENOUS ONCE
Status: COMPLETED | OUTPATIENT
Start: 2019-08-30 | End: 2019-08-30

## 2019-08-30 RX ADMIN — SENNOSIDES, DOCUSATE SODIUM 2 TABLET: 50; 8.6 TABLET, FILM COATED ORAL at 17:38

## 2019-08-30 RX ADMIN — LEVOTHYROXINE SODIUM 75 MCG: 75 TABLET ORAL at 05:41

## 2019-08-30 RX ADMIN — BUSPIRONE HYDROCHLORIDE 5 MG: 10 TABLET ORAL at 17:40

## 2019-08-30 RX ADMIN — METHOCARBAMOL TABLETS 750 MG: 750 TABLET, COATED ORAL at 05:42

## 2019-08-30 RX ADMIN — IPRATROPIUM BROMIDE AND ALBUTEROL SULFATE 3 ML: .5; 3 SOLUTION RESPIRATORY (INHALATION) at 21:50

## 2019-08-30 RX ADMIN — ACETAMINOPHEN 650 MG: 325 TABLET, FILM COATED ORAL at 23:34

## 2019-08-30 RX ADMIN — GABAPENTIN 300 MG: 300 CAPSULE ORAL at 17:39

## 2019-08-30 RX ADMIN — SODIUM BICARBONATE 650 MG: 650 TABLET ORAL at 05:41

## 2019-08-30 RX ADMIN — IPRATROPIUM BROMIDE AND ALBUTEROL SULFATE 3 ML: .5; 3 SOLUTION RESPIRATORY (INHALATION) at 06:52

## 2019-08-30 RX ADMIN — FUROSEMIDE 80 MG: 40 TABLET ORAL at 08:31

## 2019-08-30 RX ADMIN — INSULIN HUMAN 2 UNITS: 100 INJECTION, SOLUTION PARENTERAL at 21:26

## 2019-08-30 RX ADMIN — FAMOTIDINE 20 MG: 20 TABLET ORAL at 05:42

## 2019-08-30 RX ADMIN — INSULIN GLARGINE 10 UNITS: 100 INJECTION, SOLUTION SUBCUTANEOUS at 17:01

## 2019-08-30 RX ADMIN — SODIUM BICARBONATE 325 MG: 650 TABLET ORAL at 16:03

## 2019-08-30 RX ADMIN — ACETAMINOPHEN 650 MG: 325 TABLET, FILM COATED ORAL at 05:41

## 2019-08-30 RX ADMIN — CEFTRIAXONE SODIUM 2 G: 2 INJECTION, POWDER, FOR SOLUTION INTRAMUSCULAR; INTRAVENOUS at 09:26

## 2019-08-30 RX ADMIN — BUSPIRONE HYDROCHLORIDE 5 MG: 10 TABLET ORAL at 05:42

## 2019-08-30 RX ADMIN — IPRATROPIUM BROMIDE AND ALBUTEROL SULFATE 3 ML: .5; 3 SOLUTION RESPIRATORY (INHALATION) at 19:26

## 2019-08-30 RX ADMIN — BUDESONIDE AND FORMOTEROL FUMARATE DIHYDRATE 2 PUFF: 160; 4.5 AEROSOL RESPIRATORY (INHALATION) at 06:51

## 2019-08-30 RX ADMIN — IVABRADINE 7.5 MG: 5 TABLET, FILM COATED ORAL at 17:40

## 2019-08-30 RX ADMIN — AZITHROMYCIN 500 MG: 250 TABLET, FILM COATED ORAL at 09:26

## 2019-08-30 RX ADMIN — INSULIN HUMAN 1 UNITS: 100 INJECTION, SOLUTION PARENTERAL at 12:21

## 2019-08-30 RX ADMIN — METHOCARBAMOL TABLETS 750 MG: 750 TABLET, COATED ORAL at 21:21

## 2019-08-30 RX ADMIN — METHOCARBAMOL TABLETS 750 MG: 750 TABLET, COATED ORAL at 12:26

## 2019-08-30 RX ADMIN — TRAZODONE HYDROCHLORIDE 100 MG: 50 TABLET ORAL at 21:21

## 2019-08-30 RX ADMIN — NITROFURANTOIN MONOHYDRATE/MACROCRYSTALLINE 100 MG: 25; 75 CAPSULE ORAL at 08:31

## 2019-08-30 RX ADMIN — ENOXAPARIN SODIUM 40 MG: 100 INJECTION SUBCUTANEOUS at 05:42

## 2019-08-30 RX ADMIN — GABAPENTIN 300 MG: 300 CAPSULE ORAL at 21:21

## 2019-08-30 RX ADMIN — METHYLPREDNISOLONE SODIUM SUCCINATE 125 MG: 125 INJECTION, POWDER, FOR SOLUTION INTRAMUSCULAR; INTRAVENOUS at 18:00

## 2019-08-30 RX ADMIN — FLUOXETINE HYDROCHLORIDE 20 MG: 20 CAPSULE ORAL at 05:41

## 2019-08-30 RX ADMIN — ONDANSETRON 4 MG: 4 TABLET, ORALLY DISINTEGRATING ORAL at 08:38

## 2019-08-30 RX ADMIN — ACETAMINOPHEN 650 MG: 325 TABLET, FILM COATED ORAL at 16:02

## 2019-08-30 RX ADMIN — INSULIN HUMAN 1 UNITS: 100 INJECTION, SOLUTION PARENTERAL at 17:02

## 2019-08-30 RX ADMIN — FOLIC ACID 1 MG: 1 TABLET ORAL at 05:42

## 2019-08-30 RX ADMIN — ZIPRASIDONE HYDROCHLORIDE 80 MG: 80 CAPSULE ORAL at 08:31

## 2019-08-30 RX ADMIN — GABAPENTIN 300 MG: 300 CAPSULE ORAL at 08:31

## 2019-08-30 RX ADMIN — INSULIN HUMAN 1 UNITS: 100 INJECTION, SOLUTION PARENTERAL at 09:12

## 2019-08-30 RX ADMIN — MYCOPHENOLATE MOFETIL 1000 MG: 250 CAPSULE ORAL at 05:41

## 2019-08-30 RX ADMIN — MAGNESIUM SULFATE 2 G: 2 INJECTION INTRAVENOUS at 12:05

## 2019-08-30 RX ADMIN — Medication 1 CAPSULE: at 08:31

## 2019-08-30 RX ADMIN — BUDESONIDE AND FORMOTEROL FUMARATE DIHYDRATE 2 PUFF: 160; 4.5 AEROSOL RESPIRATORY (INHALATION) at 19:26

## 2019-08-30 RX ADMIN — METHYLPREDNISOLONE SODIUM SUCCINATE 125 MG: 125 INJECTION, POWDER, FOR SOLUTION INTRAMUSCULAR; INTRAVENOUS at 23:34

## 2019-08-30 RX ADMIN — IPRATROPIUM BROMIDE AND ALBUTEROL SULFATE 3 ML: .5; 3 SOLUTION RESPIRATORY (INHALATION) at 12:04

## 2019-08-30 RX ADMIN — SENNOSIDES, DOCUSATE SODIUM 2 TABLET: 50; 8.6 TABLET, FILM COATED ORAL at 05:41

## 2019-08-30 RX ADMIN — ZIPRASIDONE HYDROCHLORIDE 80 MG: 80 CAPSULE ORAL at 17:58

## 2019-08-30 RX ADMIN — IVABRADINE 7.5 MG: 5 TABLET, FILM COATED ORAL at 05:42

## 2019-08-30 RX ADMIN — MYCOPHENOLATE MOFETIL 1000 MG: 250 CAPSULE ORAL at 17:39

## 2019-08-30 RX ADMIN — SODIUM BICARBONATE 650 MG: 650 TABLET ORAL at 17:38

## 2019-08-30 RX ADMIN — GABAPENTIN 300 MG: 300 CAPSULE ORAL at 12:23

## 2019-08-30 RX ADMIN — METHYLPREDNISOLONE SODIUM SUCCINATE 125 MG: 125 INJECTION, POWDER, FOR SOLUTION INTRAMUSCULAR; INTRAVENOUS at 05:41

## 2019-08-30 RX ADMIN — IPRATROPIUM BROMIDE AND ALBUTEROL SULFATE 3 ML: .5; 3 SOLUTION RESPIRATORY (INHALATION) at 03:14

## 2019-08-30 RX ADMIN — PREGABALIN 300 MG: 150 CAPSULE ORAL at 05:41

## 2019-08-30 RX ADMIN — PREGABALIN 300 MG: 150 CAPSULE ORAL at 17:38

## 2019-08-30 RX ADMIN — SODIUM CHLORIDE 500 ML: 9 INJECTION, SOLUTION INTRAVENOUS at 03:02

## 2019-08-30 RX ADMIN — ASPIRIN 81 MG: 81 TABLET, COATED ORAL at 05:42

## 2019-08-30 RX ADMIN — METHYLPREDNISOLONE SODIUM SUCCINATE 125 MG: 125 INJECTION, POWDER, FOR SOLUTION INTRAMUSCULAR; INTRAVENOUS at 12:00

## 2019-08-30 RX ADMIN — FAMOTIDINE 20 MG: 20 TABLET ORAL at 17:39

## 2019-08-30 ASSESSMENT — LIFESTYLE VARIABLES: SUBSTANCE_ABUSE: 0

## 2019-08-30 ASSESSMENT — ENCOUNTER SYMPTOMS
EYE REDNESS: 0
FEVER: 0
CHILLS: 0
FLANK PAIN: 0
PALPITATIONS: 0
NECK PAIN: 0
CONSTIPATION: 0
DIAPHORESIS: 1
SENSORY CHANGE: 0
DIARRHEA: 0
TREMORS: 0
SPUTUM PRODUCTION: 0
WEAKNESS: 1
SHORTNESS OF BREATH: 1
VOMITING: 0
BLOOD IN STOOL: 0
NAUSEA: 0
WEIGHT LOSS: 0
BACK PAIN: 1
ABDOMINAL PAIN: 0
ORTHOPNEA: 0
STRIDOR: 0
EYE DISCHARGE: 0
FOCAL WEAKNESS: 0
WHEEZING: 1
BACK PAIN: 0
HALLUCINATIONS: 0
COUGH: 1
SPEECH CHANGE: 0

## 2019-08-30 NOTE — PROGRESS NOTES
Case discussed with ICU team-  Dr. Henley and pulmonary .  BP, heart rate stable.  No Aloc. Patient ok for Tele floor with Bipap.  Pulmonary to consult .

## 2019-08-30 NOTE — H&P
Hospital Medicine History & Physical Note    Date of Service  8/29/2019    Primary Care Physician  Torres Brody M.D.    Consultants      Code Status  full    Chief Complaint  Asthma      History of Presenting Illness  28 y.o. female who presented 8/29/2019 with Asthma  History of asthma  History of recurrent optic neuritis on Cellcept. Also on chronic prednisone.  Morbidly obese and diabetic.  At noon today, she became short of breath. She was wheezing. She also has been coughing only minmal phlegm but she felt she could not bring it up. When she does spit, it is brownish in color. Aunt was sick got in contact with her. No recent travel. She said she had pneumonia and flu shots but she is on Cellcept> she has not had flu shot for this year.     At the ED, she is afebrile and hemodynamically stable.  CXR showed:  No acute cardiopulmonary abnormality.  No leukocytosis. Lactic acidosis, mild. Hyperglycemia.  She improved with breathing treatments but was still wheezy and not at baseline.   When I saw her at ED, no acute distress. Anxious. Wheezing.    Review of Systems  Review of Systems   Constitutional: Negative for chills and fever.   HENT: Negative for congestion, hearing loss and nosebleeds.    Eyes: Negative for pain and redness.   Respiratory: Positive for cough, sputum production, shortness of breath and wheezing. Negative for hemoptysis.    Cardiovascular: Negative for chest pain and palpitations.   Gastrointestinal: Negative for abdominal pain, blood in stool, constipation, diarrhea, nausea and vomiting.   Genitourinary: Negative for dysuria, frequency and hematuria.   Musculoskeletal: Negative for falls, joint pain and myalgias.   Skin: Negative for rash.   Neurological: Negative for dizziness, tremors, focal weakness, seizures, loss of consciousness, weakness and headaches.   Psychiatric/Behavioral: The patient is not nervous/anxious and does not have insomnia.    All other systems reviewed and are  negative.      Past Medical History   has a past medical history of Abdominal pain, Anginal syndrome, Apnea, sleep, Arrhythmia, Arthritis, ASTHMA, Atrial fibrillation (HCC), Back pain, Borderline personality disorder (HCC), Breath shortness, Bronchitis, Cardiac arrhythmia, Chickenpox, Chronic UTI (9/18/2010), Cough, Daytime sleepiness, Depression, Diabetes (HCC), Diarrhea, Disorder of thyroid, Fall, Fatigue, Frequent headaches, Gasping for breath, Gynecological disorder, Headache(784.0), Heart burn, History of falling, Hypertension, Indigestion, Migraine, 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HCC), 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HCC), Nausea, Obesity, Pain (08-15-12), Painful joint, PCOS (polycystic ovarian syndrome), Pneumonia (2012), Psychosis ([facility]), Renal disorder, Ringing in ears, Scoliosis, Shortness of breath, Sinus tachycardia (10/31/2013), Sleep apnea, Snoring, Tonsillitis, Tuberculosis, Urinary bladder disorder, Urinary incontinence, Weakness, and Wears glasses.    Surgical History   has a past surgical history that includes neuro dest facet l/s w/ig sngl (4/21/2015); recovery (1/27/2016); [place] by laparoscopy (8/29/2010); lumbar fusion anterior (8/21/2012); other cardiac surgery (1/2016); tonsillectomy; bowel stimulator insertion (7/13/2016); gastroscopy with balloon dilatation (N/A, 1/4/2017); muscle biopsy (Right, 1/26/2017); other abdominal surgery; and laminotomy.     Family History  family history includes Genitourinary () Problems in her sister; Heart Disease in her maternal grandmother and mother; Hypertension in her maternal grandmother, maternal uncle, and mother; No Known Problems in her sister; Other in her mother and sister; Sleep Apnea in her mother.     Social History   reports that she has never smoked. She has never used smokeless tobacco. She reports that she has current or past drug history. Drugs: Marijuana and Oral. Frequency: 7.00 times per week. She reports that she does not drink  "alcohol.    Allergies  Allergies   Allergen Reactions   • Cefdinir Shortness of Breath and Itching     Tolerated 1/18/17  Tolerates ceftriaxone    • Depakote [Divalproex Sodium] Unspecified     Muscle spasms/muscle pain and weakness     • Doxycycline Anaphylaxis and Vomiting     RXN=unknown   • Amitriptyline Unspecified     Headaches     • Aripiprazole [Abilify] Unspecified     Headaches/muscle twitching     • Ciprofloxacin Rash     Pt states \"I get a rash\".     • Clindamycin Nausea     Even with food     • Ees [Erythromycin] Vomiting and Nausea   • Flagyl [Metronidazole Hcl] Unspecified     \"eye problems\"     • Flomax [Tamsulosin Hydrochloride] Swelling   • Metformin Unspecified     Increased lactic acid      • Tape Rash     Tears skin off  coban with Tegaderm tape ok intermittently  RXN=ongoing   • Vancomycin Itching     Pt becomes flushed in face and chest.   RXN=7/10/16   • Wound Dressing Adhesive Hives     By pt report   • Cephalexin [Keflex] Rash     Pt states she gets a rash with this medication  Tolerates ceftriaxone   • Divalproex Sodium [Valproic Acid] Rash     .   • Levofloxacin Unspecified     Leg muscle cramps   • Metronidazole Rash     .   • Tamsulosin Hcl        Medications  Prior to Admission Medications   Prescriptions Last Dose Informant Patient Reported? Taking?   Diclofenac Sodium 1 % Gel 8/28/2019 at PRN Patient Yes No   Sig: Apply 1 Application to skin as directed 4 times a day as needed. Apply's on wrist, back, ankles, and feet.    Diphenhydramine-APAP, sleep, (TYLENOL PM EXTRA STRENGTH PO) 8/28/2019 at PM Patient Yes No   Sig: Take 2 Caps by mouth every evening.   Dulaglutide (TRULICITY) 0.75 MG/0.5ML Solution Pen-injector 8/23/2019 at UNK Patient Yes No   Sig: Inject 0.75 mg as instructed every Friday.   FLUoxetine (PROZAC) 20 MG Cap 8/29/2019 at AM Patient No No   Sig: Take 1 Cap by mouth every day.   Folic Acid 0.8 MG Cap 8/29/2019 at AM Patient Yes Yes   Sig: Take 0.8 mg by mouth every " day.   Ivabradine HCl (CORLANOR) 7.5 MG Tab 8/29/2019 at AM Patient Yes Yes   Sig: Take 7.5 mg by mouth 2 Times a Day.   LYRICA 300 MG capsule 8/29/2019 at AM Patient Yes No   Sig: Take 300 mg by mouth 2 times a day.   Melatonin 5 MG Tab 8/29/2019 at 1000 Patient Yes No   Sig: Take 10 mg by mouth every day.   albuterol (PROVENTIL) 2.5mg/3ml Nebu Soln solution for nebulization 8/29/2019 at 1300 Patient No No   Sig: 3 mL by Nebulization route every four hours as needed for Shortness of Breath.   albuterol 108 (90 Base) MCG/ACT Aero Soln inhalation aerosol 8/29/2019 at 1230 Patient Yes No   Sig: Inhale 2 Puffs by mouth every 6 hours as needed for Shortness of Breath.   aspirin EC (ECOTRIN) 81 MG Tablet Delayed Response 8/29/2019 at AM Patient No No   Sig: Take 1 Tab by mouth every day.   budesonide-formoterol (SYMBICORT) 160-4.5 MCG/ACT Aerosol 8/29/2019 at AM Patient No No   Sig: Inhale 2 Puffs by mouth 2 Times a Day.   busPIRone (BUSPAR) 5 MG tablet 8/29/2019 at AM Patient No No   Sig: TAKE ONE TABLET BY MOUTH TWICE DAILY   etonogestrel (NEXPLANON) 68 MG Implant implant PLACED JULY 2019 at PLACED JULY 2019 Patient Yes No   Sig: Inject 1 Each as instructed Once.   furosemide (LASIX) 80 MG Tab 8/29/2019 at AM Patient Yes No   Sig: Take 80 mg by mouth every day.   gabapentin (NEURONTIN) 300 MG Cap 8/29/2019 at AFTERNOON Patient Yes No   Sig: Take 300 mg by mouth 4 times a day.   ipratropium-albuterol (DUONEB) 0.5-2.5 (3) MG/3ML nebulizer solution 8/29/2019 at 1200 Patient No No   Sig: 3 mL by Nebulization route every 6 hours as needed for Shortness of Breath.   levothyroxine (SYNTHROID) 75 MCG Tab 8/29/2019 at AM Patient No No   Sig: Take 1 Tab by mouth Every morning on an empty stomach.   methocarbamol (ROBAXIN) 750 MG Tab 8/29/2019 at AM Patient Yes No   Sig: Take 750 mg by mouth 3 times a day.   mycophenolate (CELLCEPT) 500 MG tablet 8/29/2019 at AM Patient Yes No   Sig: Take 1,000 mg by mouth 2 times a day.    ondansetron (ZOFRAN) 4 MG Tab tablet 8/29/2019 at AM Patient No No   Sig: Take 1 Tab by mouth every four hours as needed for Nausea/Vomiting.   predniSONE (DELTASONE) 5 MG Tab 8/29/2019 at AM Patient Yes No   Sig: Take 15 mg by mouth every day.   ranitidine (ZANTAC) 300 MG tablet 8/29/2019 at AM Patient No No   Sig: Take 1 Tab by mouth every day.   sodium bicarbonate 325 MG Tab 8/29/2019 at AM Patient Yes No   Sig: Take 325-650 mg by mouth 3 times a day. 650 mg in AM  325 mg mid-afternoon  650 mg at night   traZODone (DESYREL) 100 MG Tab 8/28/2019 at PM Patient No No   Sig: TAKE  ONE TABLET BY MOUTH NIGHTLY AT BEDTIME   ziprasidone (GEODON) 80 MG Cap 8/29/2019 at AM Patient Yes No   Sig: Take 80 mg by mouth 2 Times a Day.      Facility-Administered Medications: None       Physical Exam  Temp:  [37 °C (98.6 °F)] 37 °C (98.6 °F)  Pulse:  [] 94  Resp:  [22] 22  BP: (116-144)/(60-75) 116/60  SpO2:  [90 %-98 %] 93 %    Physical Exam   Constitutional: She appears well-developed.   HENT:   Head: Normocephalic and atraumatic.   Eyes: Conjunctivae are normal. No scleral icterus.   Neck: Normal range of motion. Neck supple.   Cardiovascular: Normal rate and regular rhythm. Exam reveals no gallop and no friction rub.   No murmur heard.  Pulmonary/Chest: Effort normal. No respiratory distress. She has wheezes. She has no rales.   Abdominal: Soft. Bowel sounds are normal. She exhibits distension (Obses, soft). There is no tenderness. There is no rebound and no guarding.   Musculoskeletal: She exhibits no edema or tenderness.   Neurological: She is alert.   Skin: Skin is warm.   Psychiatric: She has a normal mood and affect. Her behavior is normal.   Anxious       Laboratory:  Recent Labs     08/29/19  1427   WBC 9.8   RBC 4.50   HEMOGLOBIN 14.2   HEMATOCRIT 44.0   MCV 97.8   MCH 31.6   MCHC 32.3*   RDW 47.5   PLATELETCT 145*   MPV 11.0     Recent Labs     08/29/19  1427   SODIUM 139   POTASSIUM 3.8   CHLORIDE 109   CO2  15*   GLUCOSE 232*   BUN 21   CREATININE 1.02   CALCIUM 9.1     Recent Labs     08/29/19  1427   ALTSGPT 49   ASTSGOT 15   ALKPHOSPHAT 33   TBILIRUBIN 0.4   GLUCOSE 232*     Recent Labs     08/29/19  1439   APTT 23.2*   INR 0.94     Recent Labs     08/29/19  1427   NTPROBNP 18         Recent Labs     08/29/19  1427   TROPONINT <6       Urinalysis:    Recent Labs     08/29/19  1453   SPECGRAVITY 1.031   GLUCOSEUR 250*   KETONES 15*   NITRITE Negative   LEUKESTERAS Moderate*   WBCURINE 100-150*   RBCURINE 2-5*   BACTERIA Few*   EPITHELCELL Negative        Imaging:  DX-CHEST-PORTABLE (1 VIEW)   Final Result      No acute cardiopulmonary abnormality.            Assessment/Plan:  I anticipate this patient will require at least two midnights for appropriate medical management, necessitating inpatient admission.    * Moderate persistent asthma with acute exacerbation  Assessment & Plan  Resp (breathing treatments) and O2 per protocol  IV steroids  PO antibiotics, follow procalcitonin  Pulmonary rehab referral      Uncontrolled type 2 diabetes mellitus with hyperglycemia (HCC)  Assessment & Plan  Continue basal insulin  Ordred SS insulin  Ordered A1c      UTI (urinary tract infection)  Assessment & Plan  Ordered nitrofurantoin  Ordered urine culture  Ordered IV fluids    Chronic pain syndrome- (present on admission)  Assessment & Plan  Continue her pain medications, psychotropics at current doses. This has been verified by pharmacy. She mentions she takes these medications and follows Pain clinic    Morbid obesity with BMI of 45.0-49.9, adult (HCC)- (present on admission)  Assessment & Plan  Body mass index is 46.81 kg/m².  When better, encourage healthy diet, activity      VTE prophylaxis: Lovenox SQ  Reviewed vitals, labs, imaging, staff notes.  Discussed assessment and plan with Kristin Balderrama. Provided reassurance.   Discussed with ED physician

## 2019-08-30 NOTE — PROGRESS NOTES
Lab called this RN with a lactic result of 4.4. This RN paged Hospitalist on-call, Dr. Fernandes to update her on pt status. 500mL bolus of NS ordered with a lactic recheck at 0600.

## 2019-08-30 NOTE — ASSESSMENT & PLAN NOTE
Continue her pain medications, psychotropics at current doses. This has been verified by pharmacy. She mentions she takes these medications and follows Pain clinic  sched robaxin, neurotin

## 2019-08-30 NOTE — ED NOTES
Pt transported off unit by ED tech, VSS, GCS 15. Pt has wheezes present, per dayshift RN this has been patients baseline.

## 2019-08-30 NOTE — PROGRESS NOTES
Received report from Kevin HASSAN. Assumed care at 1100. This pt is AOx4, ambulatory up to bedside commode with SBA, voiding adequately, reports baseline chronic back pain, meds ordered. Patient and RN discussed plan of care: questions answered. Labs noted Lactic at 4.1, assessment complete, patient tolerating regular diet. Tele box in place. Pt is on 6L of O2 via NC. Call light in place, fall precautions in place, patient educated on importance of calling for assistance. No additional needs at this time. VSS

## 2019-08-30 NOTE — PROGRESS NOTES
Rasta from Lab called with critical result of Lactic Acid at 4.1. Critical lab result read back to Rasta.   Dr. Bowman paged of critical lab result at 4.1. Awaiting call back from Dr. Bowman

## 2019-08-30 NOTE — RESPIRATORY CARE
COPD EDUCATION by COPD CLINICAL EDUCATOR  8/30/2019 at 6:17 AM by Ben Abad     Patient reviewed by COPD education team. Patient does not have a history or diagnosis of COPD and is a non-smoker, therefore does not qualify for the COPD program.

## 2019-08-30 NOTE — PROGRESS NOTES
"2 RN skin check complete.     Pt has wound located on mid abdomen. Healing and scabbed over; MARK with no drainage.  Pt has several small \"scratch\" wounds on left breast; all MARK with no drainage.  Pt has scattered scratches and bruises throughout extremities.    Pt able to turn self side to side. Waffle cushion in place. All other skin preventions in place.       "

## 2019-08-30 NOTE — ASSESSMENT & PLAN NOTE
Suspect due to asthma exacerbation with persistent wheezing, increased tachypnea, dyspnea despite multiple bronchodilator therapy, steroids  Give IV magnesium  Continue IV Solu-Medrol, aggressive DuoNeb bronchodilator therapy  RT protocol  -doing clinically better and nearly back to her baseline, pulm following

## 2019-08-30 NOTE — FLOWSHEET NOTE
Respiratory Therapy Update    Interdisciplinary Plan of Care-Goals (Indications)  Bronchodilator Indications: History / Diagnosis;Physical Exam / Hyperinflation / Wheezing (bronchospasm);Strong Subjective / Objective Improvement (08/30/19 1201)  Interdisciplinary Plan of Care-Outcomes   Bronchodilator Outcome: Diminished Wheezing and Volume of Air Movement Increased;Improved Vital Signs and Measures of Gas Exchange;Patient at Stable Baseline (08/30/19 1201)          #SVN Performed: Yes (08/30/19 1201)    Cough: Moist;Non Productive (08/30/19 1201)  Sputum Amount: Unable to Evaluate (08/30/19 1201)  Sputum Color: Unable to Evaluate (08/30/19 1201)  Sputum Consistency: Unable to Evaluate (08/30/19 1201)                  O2 (LPM): 6 (08/30/19 1201)  O2 Daily Delivery Respiratory : OxyMask (08/30/19 1201)    Breath Sounds  Pre/Post Intervention: Pre Intervention Assessment (08/30/19 1201)  RUL Breath Sounds: Expiratory Wheezes (08/30/19 1201)  RML Breath Sounds: Diminished;Expiratory Wheezes (08/30/19 1201)  RLL Breath Sounds: Diminished;Expiratory Wheezes (08/30/19 1201)  MARY Breath Sounds: Expiratory Wheezes (08/30/19 1201)  LLL Breath Sounds: Diminished;Expiratory Wheezes (08/30/19 1201)        Events/Summary/Plan: Pt. transfered from .  SVN done via m/p. Pt. alert. (08/30/19 1201)

## 2019-08-30 NOTE — CARE PLAN
Problem: Communication  Goal: The ability to communicate needs accurately and effectively will improve  Outcome: PROGRESSING AS EXPECTED    Pt updated on POC and all questions answered at this time. Hourly rounding in place.      Problem: Safety  Goal: Will remain free from injury  Outcome: PROGRESSING AS EXPECTED    Proper fall precautions in place. Call light within reach and encouraged to use. Hourly rounding in practice.

## 2019-08-30 NOTE — ASSESSMENT & PLAN NOTE
- Clinically improving with no wheezing noted and good air entry   - Cont. symbicort, singulair, and scheduled duonebs at this time  - Check daily peak flow   - If discharged, she will need slow two weeks prednisone taper regimen   - Scheduled to follow up in Pulmonary clinic on 09/05  - Need outpatient follow up closely in the pulmonary clinic to assess for anti-IgE and anti-IL5 candidacy   - Encourage CPAP compliance

## 2019-08-30 NOTE — PROGRESS NOTES
Patient was transferred from [place] [id number] for higher level of care. Increase SOB requiring closer observation. She is awake and alert on 6 liter mask with sat's at 95 %. Informed of safety and call system. In a sinus tach at 115. Will observe closely. Failed IV's to right foot.

## 2019-08-30 NOTE — ASSESSMENT & PLAN NOTE
Scheduled Lantus  Monitor serial Accu-Cheks provide sliding scale insulin coverage as needed.  Follow-up hemoglobin A1c  -sugars are doing ok while on increased dose of steroids

## 2019-08-30 NOTE — PROGRESS NOTES
Spoke to Dr Quinonez regarding ABG results. Stated that pt is okay to stay on 6L of O2 via facemask and consider a CPAP during the night for TONYA. Pt's 02 sat at 96%

## 2019-08-30 NOTE — PROGRESS NOTES
[facility] Medicine Daily Progress Note    Date of Service  8/30/2019    Chief Complaint  28 y.o. female admitted 8/29/2019 with worsening shortness of breath, fatigue.    Hospital Course    History of asthma, chronic respite failure O2 dependent 2-4  L nasal cannula, morbid obesity, chronic edema ,  DM II chronic back pain,  optic neuritis on CellCept, chronic prednisone followed by Dr. Newton admitted with findings of asthma exacerbation, UTI.  Patient started on IV steroids, bronchodilator therapy.     Interval Problem Update    Overnight with findings of acute on chronic course of failure-O2 requirements increased to 6 L mask.  Increased tachypnea /  dyspnea with persistent wheeze, cough.  Lactic acidosis increased to 4.4 and required multiple IV fluid boluses.  Increasing generalized edema.    Consultants/Specialty    Plan pulmonary consultation    Code Status  Full code    Disposition    Plan transfer to ICU    Review of Systems  Review of Systems   Constitutional: Positive for diaphoresis and malaise/fatigue. Negative for chills and fever.   HENT: Positive for congestion.    Eyes: Negative for discharge and redness.   Respiratory: Positive for cough, shortness of breath and wheezing. Negative for stridor.    Cardiovascular: Positive for leg swelling. Negative for chest pain and palpitations.   Gastrointestinal: Negative for abdominal pain, blood in stool, constipation, diarrhea and vomiting.   Genitourinary: Negative for flank pain and hematuria.   Musculoskeletal: Positive for back pain. Negative for joint pain and neck pain.        Chronic   Neurological: Positive for weakness (Generalized). Negative for tremors, sensory change, speech change and focal weakness.   Psychiatric/Behavioral: Negative for hallucinations and substance abuse.        Physical Exam  Temp:  [36.2 °C (97.1 °F)-37 °C (98.6 °F)] 36.7 °C (98 °F)  Pulse:  [] 116  Resp:  [20-22] 20  BP: (116-151)/(53-76) 151/71  SpO2:  [90 %-98 %] 98  %    Physical Exam   Constitutional: She is oriented to person, place, and time.   Tachypneic with exertion   HENT:   Head: Normocephalic and atraumatic.   Right Ear: External ear normal.   Left Ear: External ear normal.   Nose: Nose normal.   Eyes: EOM are normal. Right eye exhibits no discharge. Left eye exhibits no discharge. No scleral icterus.   Neck: Neck supple. No JVD present.   Cardiovascular:   No murmur heard.  Tachycardic, regular  2+ symmetric radial pulses  Distal extremities warm, perfusing no cyanosis   Pulmonary/Chest: No stridor. She has wheezes (Faint bilateral expiratory wheezes, diminished breath sounds at bases). She has no rales.   Abdominal: Soft. Bowel sounds are normal. She exhibits no distension. There is no tenderness.   Musculoskeletal: She exhibits edema (Generalized 2-3+, cushingoid appearance).   Peripheral line left foot   Neurological: She is alert and oriented to person, place, and time.   No gross focal weakness   Skin: Skin is warm and dry. She is not diaphoretic. No pallor.   Psychiatric: She has a normal mood and affect. Her behavior is normal.   Vitals reviewed.      Fluids    Intake/Output Summary (Last 24 hours) at 8/30/2019 0809  Last data filed at 8/30/2019 0400  Gross per 24 hour   Intake 980 ml   Output --   Net 980 ml       Laboratory  Recent Labs     08/29/19  1427 08/30/19  0215   WBC 9.8 12.0*   RBC 4.50 3.97*   HEMOGLOBIN 14.2 12.8   HEMATOCRIT 44.0 38.8   MCV 97.8 97.7   MCH 31.6 32.2   MCHC 32.3* 33.0*   RDW 47.5 46.8   PLATELETCT 145* 150*   MPV 11.0 11.1     Recent Labs     08/29/19  1427 08/30/19  0215   SODIUM 139 135   POTASSIUM 3.8 4.4   CHLORIDE 109 106   CO2 15* 16*   GLUCOSE 232* 178*   BUN 21 14   CREATININE 1.02 0.71   CALCIUM 9.1 9.2     Recent Labs     08/29/19  1439 08/29/19  2257   APTT 23.2*  --    INR 0.94 0.95               Imaging  DX-CHEST-PORTABLE (1 VIEW)   Final Result      No acute cardiopulmonary abnormality.           Assessment/Plan  *  Acute and chronic respiratory failure (acute-on-chronic) (Formerly [facility])  Assessment & Plan  Suspect due to asthma exacerbation with persistent wheezing, increased tachypnea, dyspnea despite multiple bronchodilator therapy, steroids  Give IV magnesium  Continue IV Solu-Medrol, aggressive DuoNeb bronchodilator therapy  RT protocol  Schedule Symbicort  Check phosphorus  She is requiring increased O2 support on 6 L oxygen mask-patient may benefit from BiPAP.  Repeat stat chest x-ray.  Transfer to ICU. Discussed with pulmonary to consult.     Moderate persistent asthma with acute exacerbation  Assessment & Plan  Clinically worsened with  increased O2 requirements.  Initial chest x-ray without infiltrate.  IV steroids, DuoNeb every 4 hours  IV magnesium  sched symbicort.  Start empiric IV Rocephin, Azithromycin  as she has tolerated these in the past ( Reviewed drug use hx w pharmacy )   Check procalcitonin level.   Repeat CXR  Check Influe  Pulmonary input, eval for Bipap .       Metabolic acidosis  Assessment & Plan  Due to lactic acidosis   Treat as below.    Uncontrolled type 2 diabetes mellitus with hyperglycemia (Formerly [facility])  Assessment & Plan  Scheduled Lantus  Monitor serial Accu-Cheks provide sliding scale insulin coverage as needed.  Follow-up hemoglobin A1c      Optic neuritis- (present on admission)  Assessment & Plan  Follow up with Dr. Newton , neuro opth  Continue home medications     UTI (urinary tract infection)  Assessment & Plan  Start Rocephin. Dc Macrobid.       Chronic pain syndrome- (present on admission)  Assessment & Plan  Continue her pain medications, psychotropics at current doses. This has been verified by pharmacy. She mentions she takes these medications and follows Pain clinic  sched robaxin, neurotin       Lactic acidosis  Assessment & Plan  Likely from acute on chronic resp failure. BP stable. Differential includes developing Sepsis in immunocompromised patient.   Start sepsis protocols   Emp IV  rocephin, doxy  IVFs per sepss guidelines.  Fu cultures   Fu serial lactate  Procal level  Repeat CXR to eval for new infiltrates  Treat resp failure to facilitate perfusion   Monitor in ICU>      Morbid obesity with BMI of 45.0-49.9, adult (HCC)- (present on admission)  Assessment & Plan  Body mass index is 46.81 kg/m².  When better, encourage healthy diet, activity    Anxiety- (present on admission)  Assessment & Plan  Continue Buspar and Prozac.    Borderline personality disorder in adult (HCC)- (present on admission)  Assessment & Plan  HX of per records.   Continue with home psychiatric meds        VTE prophylaxis: lovenox       Patient currently is critically ill.     The very real possibilty of a deterioration of this patient's condition required the highest level of my preparedness for sudden, emergent intervention.  I provided critical care services, which included medication orders, frequent reevaluations of the patient's condition and response to treatment, ordering and reviewing test results, and discussing the case with various consultants.  The critical care time associated with the care of the patient was 40  minutes. Review chart for interventions. This time is exclusive of any other billable procedures.

## 2019-08-30 NOTE — ASSESSMENT & PLAN NOTE
- Encourage CPAP compliance at night   - Will need outpatient CPAP titration study once discharged

## 2019-08-30 NOTE — DIETARY
NUTRITION SERVICES: BMI - Pt with BMI >40 (=Body mass index is 46.81 kg/m².), morbid obesity. Weight loss counseling not appropriate in acute care setting. RECOMMEND - Referral to outpatient nutrition services for weight management after D/C.

## 2019-08-30 NOTE — CONSULTS
Pulmonary Consultation    Date of consult: 8/30/2019    Referring Physician  Hari Bowman M.D.    Reason for Consultation  Acute on chronic hypoxia respiratory failure    History of Presenting Illness  28 y.o. female who presented 8/29/2019 with shortness of breath. Patient has a significant history of morbid obesity, severe joint arthritis with limited mobility, chronic hypoxemia on 4 liters NC, optic neuritis on chronic immunosuppressive therapy and prednisone 15 mg, and history of self reported asthma, presented with worsening shortness of breath. She was recently admitted two weeks ago for similar complaints and treated for asthma exacerbation. Patient state since discharge, she reports feeling better but noted over the past few days she started having intermittent cough and running nose. Her shortness of breath worsen and she tried using her albuterol nebulizer but has minimum improvement. She presented to the ED yesterday and was admitted for asthma exacerbation. She was started on scheduled duoneb q4hr, solumedrol 125 mg VI q6hr, and symbicort. Pulmonary is consulted for frequent asthma exacerbation.    Currently, patient reports feeling better. Cough is intermittent but usually nonproductive. Denies fever/chills, N/V, or chest pain. She reports that she was diagnosed with asthma as a child. She had PFTs done back in 2008 showing normal ratio with no evidence of bronchodilator response. She was recently diagnosed with mild sleep apnea with AHI 12.7 on 08/06 and is awaiting for sleep titration study. She is a nonsmoker and has no second hand smoke. She has one cat at home. Admits to having seasonal allergies.       Code Status  Full Code    Review of Systems  Review of Systems   Constitutional: Negative for chills, fever and weight loss.   Respiratory: Positive for cough, shortness of breath and wheezing. Negative for sputum production.    Cardiovascular: Negative for palpitations, orthopnea and leg swelling.    Gastrointestinal: Negative for abdominal pain, diarrhea, nausea and vomiting.   Genitourinary: Negative for dysuria.   Musculoskeletal: Negative for back pain.   Skin: Negative for rash.       Past Medical History   has a past medical history of Abdominal pain, Anginal syndrome, Apnea, sleep, Arrhythmia, Arthritis, ASTHMA, Atrial fibrillation (Formerly [facility]), Back pain, Borderline personality disorder (Formerly [facility]), Breath shortness, Bronchitis, Cardiac arrhythmia, Chickenpox, Chronic UTI (9/18/2010), Cough, Daytime sleepiness, Depression, Diabetes (Formerly [facility]), Diarrhea, Disorder of thyroid, Fall, Fatigue, Frequent headaches, Gasping for breath, Gynecological disorder, Headache(784.0), Heart burn, History of falling, Hypertension, Indigestion, Migraine, 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Formerly [facility]), 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    Surgical History   has a past surgical history that includes neuro dest facet l/s w/ig sngl (4/21/2015); recovery (1/27/2016); delmar by laparoscopy (8/29/2010); lumbar fusion anterior (8/21/2012); other cardiac surgery (1/2016); tonsillectomy; bowel stimulator insertion (7/13/2016); gastroscopy with balloon dilatation (N/A, 1/4/2017); muscle biopsy (Right, 1/26/2017); other abdominal surgery; and laminotomy.    Family History  family history includes Genitourinary () Problems in her sister; Heart Disease in her maternal grandmother and mother; Hypertension in her maternal grandmother, maternal uncle, and mother; No Known Problems in her sister; Other in her mother and sister; Sleep Apnea in her mother.    Social History   reports that she has never smoked. She has never used  smokeless tobacco. She reports that she has current or past drug history. Drugs: Marijuana and Oral. Frequency: 7.00 times per week. She reports that she does not drink alcohol.    Medications  Home Medications     Reviewed by Ulisses Nina (Pharmacy Tech) on 08/29/19 at 1430  Med List Status: Complete   Medication Last Dose Status   albuterol (PROVENTIL) 2.5mg/3ml Nebu Soln solution for nebulization 8/29/2019 Active   albuterol 108 (90 Base) MCG/ACT Aero Soln inhalation aerosol 8/29/2019 Active   aspirin EC (ECOTRIN) 81 MG Tablet Delayed Response 8/29/2019 Active   budesonide-formoterol (SYMBICORT) 160-4.5 MCG/ACT Aerosol 8/29/2019 Active   busPIRone (BUSPAR) 5 MG tablet 8/29/2019 Active   Diclofenac Sodium 1 % Gel 8/28/2019 Active   Diphenhydramine-APAP, sleep, (TYLENOL PM EXTRA STRENGTH PO) 8/28/2019 Active   Dulaglutide (TRULICITY) 0.75 MG/0.5ML Solution Pen-injector 8/23/2019 Active   etonogestrel (NEXPLANON) 68 MG Implant implant PLACED JULY 2019 Active   FLUoxetine (PROZAC) 20 MG Cap 8/29/2019 Active   Folic Acid 0.8 MG Cap 8/29/2019 Active   furosemide (LASIX) 80 MG Tab 8/29/2019 Active   gabapentin (NEURONTIN) 300 MG Cap 8/29/2019 Active   ipratropium-albuterol (DUONEB) 0.5-2.5 (3) MG/3ML nebulizer solution 8/29/2019 Active   Ivabradine HCl (CORLANOR) 7.5 MG Tab 8/29/2019 Active   levothyroxine (SYNTHROID) 75 MCG Tab 8/29/2019 Active   LYRICA 300 MG capsule 8/29/2019 Active   Melatonin 5 MG Tab 8/29/2019 Active   methocarbamol (ROBAXIN) 750 MG Tab 8/29/2019 Active   mycophenolate (CELLCEPT) 500 MG tablet 8/29/2019 Active   ondansetron (ZOFRAN) 4 MG Tab tablet 8/29/2019 Active   predniSONE (DELTASONE) 5 MG Tab 8/29/2019 Active   ranitidine (ZANTAC) 300 MG tablet 8/29/2019 Active   sodium bicarbonate 325 MG Tab 8/29/2019 Active   traZODone (DESYREL) 100 MG Tab 8/28/2019 Active   ziprasidone (GEODON) 80 MG Cap 8/29/2019 Active              Current Facility-Administered Medications   Medication Dose Route  Frequency Provider Last Rate Last Dose   • cefTRIAXone (ROCEPHIN) 2 g in  mL IVPB  2 g Intravenous Q24HRS Hari Bowman M.D. 200 mL/hr at 08/30/19 0926 2 g at 08/30/19 0926   • azithromycin (ZITHROMAX) tablet 500 mg  500 mg Oral DAILY Hari Bowman M.D.   500 mg at 08/30/19 0926   • magnesium sulfate IVPB premix 2 g  2 g Intravenous Once Hari Bowman M.D.       • albuterol (PROVENTIL) 2.5mg/0.5ml nebulizer solution 2.5 mg  2.5 mg Nebulization Q4H PRN (RT) Dmitriy Harper M.D.       • aspirin EC (ECOTRIN) tablet 81 mg  81 mg Oral DAILY Dmitriy Harper M.D.   81 mg at 08/30/19 0542   • budesonide-formoterol (SYMBICORT) 160-4.5 MCG/ACT inhaler 2 Puff  2 Puff Inhalation BID (RT) Dmitriy Harper M.D.   2 Puff at 08/30/19 0651   • busPIRone (BUSPAR) tablet 5 mg  5 mg Oral BID Dmitriy Harper M.D.   5 mg at 08/30/19 0542   • FLUoxetine (PROZAC) capsule 20 mg  20 mg Oral DAILY Dmitriy Harper M.D.   20 mg at 08/30/19 0541   • senna-docusate (PERICOLACE or SENOKOT S) 8.6-50 MG per tablet 2 Tab  2 Tab Oral BID Dmitriy Harper M.D.   2 Tab at 08/30/19 0541    And   • polyethylene glycol/lytes (MIRALAX) PACKET 1 Packet  1 Packet Oral QDAY PRN Dmitriy Harper M.D.        And   • magnesium hydroxide (MILK OF MAGNESIA) suspension 30 mL  30 mL Oral QDAY PRN Dmitriy Harper M.D.        And   • bisacodyl (DULCOLAX) suppository 10 mg  10 mg Rectal QDAY PRN Dmitriy Harper M.D.       • enoxaparin (LOVENOX) inj 40 mg  40 mg Subcutaneous DAILY Dmitriy Harper M.D.   40 mg at 08/30/19 0542   • guaiFENesin dextromethorphan (ROBITUSSIN DM) 100-10 MG/5ML syrup 10 mL  10 mL Oral Q6HRS PRN Dmitriy Harper M.D.       • Respiratory Care per Protocol   Nebulization Continuous RT Dmitriy Harper M.D.       • lactobacillus rhamnosus (CULTURELLE) capsule 1 Cap  1 Cap Oral QDAY with Breakfast Dmitriy Harper M.D.   1 Cap at 08/30/19 0831   • lactated ringers infusion (BOLUS)  500 mL Intravenous Once PRN Dmitriy HELTON  RUFUS Harper       • nitrofurantoin monohyd macro (MACROBID) capsule CAPS 100 mg  100 mg Oral BID WITH MEALS Dmitriy Harper M.D.   100 mg at 08/30/19 0831   • insulin regular (HUMULIN R) injection 1-6 Units  1-6 Units Subcutaneous 4X/DAY ACHS Dmitriy Harper M.D.   1 Units at 08/30/19 0912    And   • glucose 4 g chewable tablet 16 g  16 g Oral Q15 MIN PRN Dmitriy Harper M.D.        And   • DEXTROSE 10% BOLUS 250 mL  250 mL Intravenous Q15 MIN PRN Dmitriy Harper M.D.       • acetaminophen (TYLENOL) tablet 650 mg  650 mg Oral Q4HRS PRN Dmitriy Harper M.D.   650 mg at 08/30/19 0541   • methylPREDNISolone sod succ (SOLU-MEDROL) 125 MG injection 125 mg  125 mg Intravenous Q6HRS Dmitriy Harper M.D.   125 mg at 08/30/19 0541   • insulin glargine (LANTUS) injection 10 Units  10 Units Subcutaneous Q EVENING Dmitriy Harper M.D.       • ipratropium-albuterol (DUONEB) nebulizer solution  3 mL Nebulization Q4HRS (RT) Tamiko Jernigan M.D.   3 mL at 08/30/19 0652   • folic acid (FOLVITE) tablet 1 mg  1 mg Oral DAILY Dmitriy Harper M.D.   1 mg at 08/30/19 0542   • gabapentin (NEURONTIN) capsule 300 mg  300 mg Oral 4X/DAY Dmitriy Harper M.D.   300 mg at 08/30/19 0831   • ipratropium-albuterol (DUONEB) nebulizer solution  3 mL Nebulization Q6HRS PRN (RT) Dmitriy Harper M.D.       • ivabradine (CORLANOR) tablet 7.5 mg  7.5 mg Oral BID Dmitriy Harper M.D.   7.5 mg at 08/30/19 0542   • levothyroxine (SYNTHROID) tablet 75 mcg  75 mcg Oral AM ES Dmitriy Harper M.D.   75 mcg at 08/30/19 0541   • furosemide (LASIX) tablet 80 mg  80 mg Oral DAILY Dmitriy Harper M.D.   80 mg at 08/30/19 0831   • methocarbamol (ROBAXIN) tablet 750 mg  750 mg Oral TID Dmitriy Harper M.D.   750 mg at 08/30/19 0542   • mycophenolate (CELLCEPT) capsule 1,000 mg  1,000 mg Oral BID Dmitriy Harper M.D.   1,000 mg at 08/30/19 0541   • ondansetron (ZOFRAN ODT) dispertab 4 mg  4 mg Oral Q4HRS PRN Dmitriy Harper M.D.   4  "mg at 08/30/19 0838   • sodium bicarbonate tablet 650 mg  650 mg Oral BID Dmitriy Harper M.D.   650 mg at 08/30/19 0541   • traZODone (DESYREL) tablet 100 mg  100 mg Oral QHS Dmitriy Harper M.D.   100 mg at 08/29/19 2335   • ziprasidone (GEODON) capsule 80 mg  80 mg Oral BID WITH MEALS Dmitriy Harper M.D.   80 mg at 08/30/19 0831   • pregabalin (LYRICA) capsule 300 mg  300 mg Oral BID Dmitriy Harper M.D.   300 mg at 08/30/19 0541   • famotidine (PEPCID) tablet 20 mg  20 mg Oral BID Dmitriy Harper M.D.   20 mg at 08/30/19 0542   • sodium bicarbonate tablet 325 mg  325 mg Oral Daily-1400 Dmitriy Harper M.D.           Allergies  Allergies   Allergen Reactions   • Cefdinir Shortness of Breath and Itching     Tolerated 1/18/17  Tolerates ceftriaxone    • Depakote [Divalproex Sodium] Unspecified     Muscle spasms/muscle pain and weakness     • Doxycycline Anaphylaxis and Vomiting     RXN=unknown   • Amitriptyline Unspecified     Headaches     • Aripiprazole [Abilify] Unspecified     Headaches/muscle twitching     • Ciprofloxacin Rash     Pt states \"I get a rash\".     • Clindamycin Nausea     Even with food     • Ees [Erythromycin] Vomiting and Nausea   • Flagyl [Metronidazole Hcl] Unspecified     \"eye problems\"     • Flomax [Tamsulosin Hydrochloride] Swelling   • Metformin Unspecified     Increased lactic acid      • Tape Rash     Tears skin off  coban with Tegaderm tape ok intermittently  RXN=ongoing   • Vancomycin Itching     Pt becomes flushed in face and chest.   RXN=7/10/16   • Wound Dressing Adhesive Hives     By pt report   • Cephalexin [Keflex] Rash     Pt states she gets a rash with this medication  Tolerates ceftriaxone   • Divalproex Sodium [Valproic Acid] Rash     .   • Levofloxacin Unspecified     Leg muscle cramps   • Metronidazole Rash     .   • Tamsulosin Hcl        Vital Signs last 24 hours  Temp:  [36.2 °C (97.1 °F)-37 °C (98.6 °F)] 36.7 °C (98 °F)  Pulse:  [] 116  Resp:  [20-22] " 20  BP: (116-151)/(53-76) 151/71  SpO2:  [90 %-98 %] 98 %    Physical Exam  Physical Exam   Constitutional: She is oriented to person, place, and time. She appears well-developed and well-nourished.   Eyes: Pupils are equal, round, and reactive to light. EOM are normal.   Neck: Normal range of motion. Neck supple.   Cardiovascular: Normal rate and regular rhythm. Exam reveals no gallop and no friction rub.   No murmur heard.  Pulmonary/Chest:   Sitting up and speaking in full sentence. Not in tripod position. Good aearation without any wheezes or rales.    Abdominal: Soft. Bowel sounds are normal. She exhibits no distension. There is no tenderness. There is no rebound.   Musculoskeletal: Normal range of motion. She exhibits no edema.   Neurological: She is alert and oriented to person, place, and time. No cranial nerve deficit. Coordination normal.       Fluids    Intake/Output Summary (Last 24 hours) at 8/30/2019 1012  Last data filed at 8/30/2019 0400  Gross per 24 hour   Intake 980 ml   Output --   Net 980 ml     Labs:  Recent Labs     08/29/19 1427 08/30/19 0215   WBC 9.8 12.0*   RBC 4.50 3.97*   HEMOGLOBIN 14.2 12.8   HEMATOCRIT 44.0 38.8   MCV 97.8 97.7   MCH 31.6 32.2   MCHC 32.3* 33.0*   RDW 47.5 46.8   PLATELETCT 145* 150*   MPV 11.0 11.1     Recent Labs     08/29/19  1439 08/29/19  2257   APTT 23.2*  --    INR 0.94 0.95             Recent Labs     08/29/19  1427 08/30/19  0215   SODIUM 139 135   POTASSIUM 3.8 4.4   CHLORIDE 109 106   CO2 15* 16*   GLUCOSE 232* 178*   BUN 21 14     Recent Labs     08/29/19  1427 08/30/19  0215   SODIUM 139 135   POTASSIUM 3.8 4.4   CHLORIDE 109 106   CO2 15* 16*   BUN 21 14   CREATININE 1.02 0.71   CALCIUM 9.1 9.2     No results found. However, due to the size of the patient record, not all encounters were searched. Please check Results Review for a complete set of results.      Imaging:   DX-CHEST-PORTABLE (1 VIEW)   Final Result      No acute cardiopulmonary  abnormality.      DX-CHEST-PORTABLE (1 VIEW)    (Results Pending)         Imaging  CXR reviewed 08/29: no dense consolidation or effusion noted.     CT chest reviewed 07/11/19: normal pulmonary vasculature with no evidence of filling defect. Mildly engorged vessels and trace of small bilateral pleural effusion.    Echo 03/2019:   Compared to the images of the prior study done - 3/1/17.  Technically difficult study - adequate information is obtained.   Normal left ventricular systolic function.  Left ventricular ejection fraction is visually estimated to be 65%.  Normal left ventricular wall thickness.  Normal left atrial size.  Trace mitral regurgitation.  Structurally normal aortic valve without significant stenosis or   regurgitation.  Estimated right ventricular systolic pressure  is 30 mmHg.  Trace tricuspid regurgitation.  Normal right ventricular size.  Assessment/Plan  * Acute on chronic respiratory failure with hypoxia (HCC)  Assessment & Plan  Likely secondary to asthma exacerbation given clinical presentation    Cont asthma treatment as above  Monitor I/O  Wean supplemental oxygen for goal SPO2 >88%       Moderate persistent asthma with acute exacerbation  Assessment & Plan  - Last hospital admission for asthma exacerbation was two weeks ago. Triggers include URI vs environmental (?cat).  - Clinically improving IV solumedrol, symbicort, and scheduled duonebs at this time  - Please add singulair 10 mg once daily  - Given frequent hospitalization for asthma exacerbation, patient may be a future candidacy for anti-IL5 vs omalizumab. However, patient will need to be off of steroids for at least two weeks and get repeat CBC with differentials and IgE level as steroids is known to decrease eosinophil counts and IgE levels.   - Will need full PFTs with methacholine challenge test   - Need CPAP therapy for her TONYA as this can worsen her asthma   - Encourage PT/OT and OOB as tolerated     TONYA (obstructive sleep  apnea)- (present on admission)  Assessment & Plan  - Confirmed with recent sleep study showing AHI 12.7 which is consistent with mild sleep apnea   - Recommend starting CPAP 8 cmH20 and outpatient for CPAP titration study        Discussed patient condition and risk of morbidity and/or mortality with RN and Dr. Bowman.

## 2019-08-30 NOTE — ASSESSMENT & PLAN NOTE
-continues to improve even further today  IV steroids, DuoNeb every 4 hours  IV magnesium  sched symbicort.  Start empiric IV Rocephin, Azithromycin  as she has tolerated these in the past ( Reviewed drug use hx w pharmacy )   -Procalc mildly elevated  -pulm following  -added singulair  -stop check LA since influenced by albuterol use  -will do slow prednisone taper at home

## 2019-08-30 NOTE — PROGRESS NOTES
Assumed care of pt, report given.  A+O x 4, VSS, and on 2-3L O2 NC.  Pt has scabbed and healing wound on mid abdomen; MARK with no drainage.   Pt has scattered abrasions and bruising.  Pt tolerating regular diabetic diet; denies N/V at this time.   +void  -BM (PTA)  Pt ambulates with a 1 assist up to the commode.  Pt updated on POC and all questions answered at this time.  Bed in lowest position, call light within reach, and no current needs.

## 2019-08-31 LAB
ANION GAP SERPL CALC-SCNC: 14 MMOL/L (ref 0–11.9)
BACTERIA UR CULT: ABNORMAL
BACTERIA UR CULT: ABNORMAL
BASOPHILS # BLD AUTO: 0.1 % (ref 0–1.8)
BASOPHILS # BLD: 0.01 K/UL (ref 0–0.12)
BUN SERPL-MCNC: 19 MG/DL (ref 8–22)
CALCIUM SERPL-MCNC: 9.3 MG/DL (ref 8.5–10.5)
CHLORIDE SERPL-SCNC: 103 MMOL/L (ref 96–112)
CO2 SERPL-SCNC: 20 MMOL/L (ref 20–33)
CREAT SERPL-MCNC: 0.88 MG/DL (ref 0.5–1.4)
EOSINOPHIL # BLD AUTO: 0 K/UL (ref 0–0.51)
EOSINOPHIL NFR BLD: 0 % (ref 0–6.9)
ERYTHROCYTE [DISTWIDTH] IN BLOOD BY AUTOMATED COUNT: 46.5 FL (ref 35.9–50)
GLUCOSE BLD-MCNC: 201 MG/DL (ref 65–99)
GLUCOSE BLD-MCNC: 234 MG/DL (ref 65–99)
GLUCOSE BLD-MCNC: 247 MG/DL (ref 65–99)
GLUCOSE BLD-MCNC: 301 MG/DL (ref 65–99)
GLUCOSE SERPL-MCNC: 238 MG/DL (ref 65–99)
HCT VFR BLD AUTO: 38.7 % (ref 37–47)
HGB BLD-MCNC: 12.6 G/DL (ref 12–16)
IMM GRANULOCYTES # BLD AUTO: 0.14 K/UL (ref 0–0.11)
IMM GRANULOCYTES NFR BLD AUTO: 1.2 % (ref 0–0.9)
LACTATE BLD-SCNC: 5.4 MMOL/L (ref 0.5–2)
LACTATE BLD-SCNC: 5.9 MMOL/L (ref 0.5–2)
LYMPHOCYTES # BLD AUTO: 0.62 K/UL (ref 1–4.8)
LYMPHOCYTES NFR BLD: 5.3 % (ref 22–41)
MCH RBC QN AUTO: 31.6 PG (ref 27–33)
MCHC RBC AUTO-ENTMCNC: 32.6 G/DL (ref 33.6–35)
MCV RBC AUTO: 97 FL (ref 81.4–97.8)
MONOCYTES # BLD AUTO: 0.27 K/UL (ref 0–0.85)
MONOCYTES NFR BLD AUTO: 2.3 % (ref 0–13.4)
NEUTROPHILS # BLD AUTO: 10.66 K/UL (ref 2–7.15)
NEUTROPHILS NFR BLD: 91.1 % (ref 44–72)
NRBC # BLD AUTO: 0 K/UL
NRBC BLD-RTO: 0 /100 WBC
PLATELET # BLD AUTO: 139 K/UL (ref 164–446)
PMV BLD AUTO: 11.3 FL (ref 9–12.9)
POTASSIUM SERPL-SCNC: 3.9 MMOL/L (ref 3.6–5.5)
RBC # BLD AUTO: 3.99 M/UL (ref 4.2–5.4)
SIGNIFICANT IND 70042: ABNORMAL
SITE SITE: ABNORMAL
SODIUM SERPL-SCNC: 137 MMOL/L (ref 135–145)
SOURCE SOURCE: ABNORMAL
WBC # BLD AUTO: 11.7 K/UL (ref 4.8–10.8)

## 2019-08-31 PROCEDURE — 94640 AIRWAY INHALATION TREATMENT: CPT

## 2019-08-31 PROCEDURE — 700102 HCHG RX REV CODE 250 W/ 637 OVERRIDE(OP): Performed by: INTERNAL MEDICINE

## 2019-08-31 PROCEDURE — A9270 NON-COVERED ITEM OR SERVICE: HCPCS | Performed by: INTERNAL MEDICINE

## 2019-08-31 PROCEDURE — 700111 HCHG RX REV CODE 636 W/ 250 OVERRIDE (IP): Performed by: INTERNAL MEDICINE

## 2019-08-31 PROCEDURE — 85025 COMPLETE CBC W/AUTO DIFF WBC: CPT

## 2019-08-31 PROCEDURE — 700101 HCHG RX REV CODE 250: Performed by: INTERNAL MEDICINE

## 2019-08-31 PROCEDURE — 700111 HCHG RX REV CODE 636 W/ 250 OVERRIDE (IP): Performed by: HOSPITALIST

## 2019-08-31 PROCEDURE — A9270 NON-COVERED ITEM OR SERVICE: HCPCS | Performed by: HOSPITALIST

## 2019-08-31 PROCEDURE — 94660 CPAP INITIATION&MGMT: CPT

## 2019-08-31 PROCEDURE — 700101 HCHG RX REV CODE 250

## 2019-08-31 PROCEDURE — 94760 N-INVAS EAR/PLS OXIMETRY 1: CPT

## 2019-08-31 PROCEDURE — 36415 COLL VENOUS BLD VENIPUNCTURE: CPT

## 2019-08-31 PROCEDURE — 99233 SBSQ HOSP IP/OBS HIGH 50: CPT | Performed by: INTERNAL MEDICINE

## 2019-08-31 PROCEDURE — 82962 GLUCOSE BLOOD TEST: CPT | Mod: 91

## 2019-08-31 PROCEDURE — 83605 ASSAY OF LACTIC ACID: CPT | Mod: 91

## 2019-08-31 PROCEDURE — 80048 BASIC METABOLIC PNL TOTAL CA: CPT

## 2019-08-31 PROCEDURE — 99232 SBSQ HOSP IP/OBS MODERATE 35: CPT | Performed by: INTERNAL MEDICINE

## 2019-08-31 PROCEDURE — 700102 HCHG RX REV CODE 250 W/ 637 OVERRIDE(OP): Performed by: HOSPITALIST

## 2019-08-31 PROCEDURE — 770020 HCHG ROOM/CARE - TELE (206)

## 2019-08-31 PROCEDURE — 700105 HCHG RX REV CODE 258: Performed by: HOSPITALIST

## 2019-08-31 RX ORDER — IPRATROPIUM BROMIDE AND ALBUTEROL SULFATE 2.5; .5 MG/3ML; MG/3ML
3 SOLUTION RESPIRATORY (INHALATION)
Status: DISCONTINUED | OUTPATIENT
Start: 2019-08-31 | End: 2019-09-01

## 2019-08-31 RX ORDER — HYDROCODONE BITARTRATE AND ACETAMINOPHEN 5; 325 MG/1; MG/1
1 TABLET ORAL ONCE
Status: COMPLETED | OUTPATIENT
Start: 2019-08-31 | End: 2019-08-31

## 2019-08-31 RX ORDER — ALBUTEROL SULFATE 90 UG/1
2 AEROSOL, METERED RESPIRATORY (INHALATION) EVERY 4 HOURS PRN
Status: DISCONTINUED | OUTPATIENT
Start: 2019-08-31 | End: 2019-09-02 | Stop reason: HOSPADM

## 2019-08-31 RX ORDER — POLYETHYLENE GLYCOL 3350 17 G/17G
1 POWDER, FOR SOLUTION ORAL DAILY
Status: DISCONTINUED | OUTPATIENT
Start: 2019-08-31 | End: 2019-09-02 | Stop reason: HOSPADM

## 2019-08-31 RX ORDER — IPRATROPIUM BROMIDE AND ALBUTEROL SULFATE 2.5; .5 MG/3ML; MG/3ML
3 SOLUTION RESPIRATORY (INHALATION)
Status: DISCONTINUED | OUTPATIENT
Start: 2019-08-31 | End: 2019-08-31

## 2019-08-31 RX ORDER — MONTELUKAST SODIUM 10 MG/1
10 TABLET ORAL NIGHTLY
Status: DISCONTINUED | OUTPATIENT
Start: 2019-08-31 | End: 2019-09-02 | Stop reason: HOSPADM

## 2019-08-31 RX ORDER — ONDANSETRON 2 MG/ML
4 INJECTION INTRAMUSCULAR; INTRAVENOUS EVERY 6 HOURS PRN
Status: DISCONTINUED | OUTPATIENT
Start: 2019-08-31 | End: 2019-09-02 | Stop reason: HOSPADM

## 2019-08-31 RX ADMIN — FOLIC ACID 1 MG: 1 TABLET ORAL at 05:21

## 2019-08-31 RX ADMIN — SODIUM BICARBONATE 325 MG: 650 TABLET ORAL at 13:30

## 2019-08-31 RX ADMIN — POLYETHYLENE GLYCOL 3350 1 PACKET: 17 POWDER, FOR SOLUTION ORAL at 19:36

## 2019-08-31 RX ADMIN — ASPIRIN 81 MG: 81 TABLET, COATED ORAL at 05:19

## 2019-08-31 RX ADMIN — IVABRADINE 7.5 MG: 5 TABLET, FILM COATED ORAL at 05:22

## 2019-08-31 RX ADMIN — ONDANSETRON 4 MG: 2 INJECTION INTRAMUSCULAR; INTRAVENOUS at 19:29

## 2019-08-31 RX ADMIN — GABAPENTIN 300 MG: 300 CAPSULE ORAL at 12:01

## 2019-08-31 RX ADMIN — LEVOTHYROXINE SODIUM 75 MCG: 75 TABLET ORAL at 05:24

## 2019-08-31 RX ADMIN — ZIPRASIDONE HYDROCHLORIDE 80 MG: 80 CAPSULE ORAL at 07:56

## 2019-08-31 RX ADMIN — IPRATROPIUM BROMIDE AND ALBUTEROL SULFATE 3 ML: .5; 3 SOLUTION RESPIRATORY (INHALATION) at 07:36

## 2019-08-31 RX ADMIN — IPRATROPIUM BROMIDE AND ALBUTEROL SULFATE 3 ML: .5; 3 SOLUTION RESPIRATORY (INHALATION) at 10:32

## 2019-08-31 RX ADMIN — IPRATROPIUM BROMIDE AND ALBUTEROL SULFATE 3 ML: .5; 3 SOLUTION RESPIRATORY (INHALATION) at 19:04

## 2019-08-31 RX ADMIN — SODIUM BICARBONATE 650 MG: 650 TABLET ORAL at 17:21

## 2019-08-31 RX ADMIN — BUDESONIDE AND FORMOTEROL FUMARATE DIHYDRATE 2 PUFF: 160; 4.5 AEROSOL RESPIRATORY (INHALATION) at 19:08

## 2019-08-31 RX ADMIN — MYCOPHENOLATE MOFETIL 1000 MG: 250 CAPSULE ORAL at 17:21

## 2019-08-31 RX ADMIN — METHYLPREDNISOLONE SODIUM SUCCINATE 125 MG: 125 INJECTION, POWDER, FOR SOLUTION INTRAMUSCULAR; INTRAVENOUS at 17:20

## 2019-08-31 RX ADMIN — MAGNESIUM HYDROXIDE 30 ML: 400 SUSPENSION ORAL at 23:01

## 2019-08-31 RX ADMIN — METHYLPREDNISOLONE SODIUM SUCCINATE 125 MG: 125 INJECTION, POWDER, FOR SOLUTION INTRAMUSCULAR; INTRAVENOUS at 12:01

## 2019-08-31 RX ADMIN — FAMOTIDINE 20 MG: 20 TABLET ORAL at 05:19

## 2019-08-31 RX ADMIN — ACETAMINOPHEN 650 MG: 325 TABLET, FILM COATED ORAL at 23:01

## 2019-08-31 RX ADMIN — SENNOSIDES, DOCUSATE SODIUM 2 TABLET: 50; 8.6 TABLET, FILM COATED ORAL at 05:23

## 2019-08-31 RX ADMIN — IVABRADINE 7.5 MG: 5 TABLET, FILM COATED ORAL at 17:19

## 2019-08-31 RX ADMIN — IPRATROPIUM BROMIDE AND ALBUTEROL SULFATE 3 ML: .5; 3 SOLUTION RESPIRATORY (INHALATION) at 15:39

## 2019-08-31 RX ADMIN — ACETAMINOPHEN 650 MG: 325 TABLET, FILM COATED ORAL at 07:56

## 2019-08-31 RX ADMIN — ZIPRASIDONE HYDROCHLORIDE 80 MG: 80 CAPSULE ORAL at 17:20

## 2019-08-31 RX ADMIN — METHYLPREDNISOLONE SODIUM SUCCINATE 125 MG: 125 INJECTION, POWDER, FOR SOLUTION INTRAMUSCULAR; INTRAVENOUS at 05:20

## 2019-08-31 RX ADMIN — INSULIN HUMAN 2 UNITS: 100 INJECTION, SOLUTION PARENTERAL at 12:01

## 2019-08-31 RX ADMIN — Medication 1 CAPSULE: at 07:56

## 2019-08-31 RX ADMIN — HYDROCODONE BITARTRATE AND ACETAMINOPHEN 1 TABLET: 5; 325 TABLET ORAL at 19:29

## 2019-08-31 RX ADMIN — METHYLPREDNISOLONE SODIUM SUCCINATE 125 MG: 125 INJECTION, POWDER, FOR SOLUTION INTRAMUSCULAR; INTRAVENOUS at 23:01

## 2019-08-31 RX ADMIN — GABAPENTIN 300 MG: 300 CAPSULE ORAL at 08:20

## 2019-08-31 RX ADMIN — MONTELUKAST 10 MG: 10 TABLET, FILM COATED ORAL at 21:09

## 2019-08-31 RX ADMIN — FLUOXETINE HYDROCHLORIDE 20 MG: 20 CAPSULE ORAL at 05:19

## 2019-08-31 RX ADMIN — FAMOTIDINE 20 MG: 20 TABLET ORAL at 17:21

## 2019-08-31 RX ADMIN — FUROSEMIDE 80 MG: 40 TABLET ORAL at 05:19

## 2019-08-31 RX ADMIN — TRAZODONE HYDROCHLORIDE 100 MG: 50 TABLET ORAL at 21:09

## 2019-08-31 RX ADMIN — INSULIN HUMAN 4 UNITS: 100 INJECTION, SOLUTION PARENTERAL at 08:26

## 2019-08-31 RX ADMIN — SODIUM BICARBONATE 650 MG: 650 TABLET ORAL at 05:20

## 2019-08-31 RX ADMIN — METHOCARBAMOL TABLETS 750 MG: 750 TABLET, COATED ORAL at 13:29

## 2019-08-31 RX ADMIN — BUSPIRONE HYDROCHLORIDE 5 MG: 10 TABLET ORAL at 05:23

## 2019-08-31 RX ADMIN — ONDANSETRON 4 MG: 4 TABLET, ORALLY DISINTEGRATING ORAL at 03:35

## 2019-08-31 RX ADMIN — ONDANSETRON 4 MG: 4 TABLET, ORALLY DISINTEGRATING ORAL at 15:36

## 2019-08-31 RX ADMIN — SENNOSIDES, DOCUSATE SODIUM 2 TABLET: 50; 8.6 TABLET, FILM COATED ORAL at 17:21

## 2019-08-31 RX ADMIN — IPRATROPIUM BROMIDE AND ALBUTEROL SULFATE 3 ML: .5; 3 SOLUTION RESPIRATORY (INHALATION) at 02:52

## 2019-08-31 RX ADMIN — INSULIN GLARGINE 10 UNITS: 100 INJECTION, SOLUTION SUBCUTANEOUS at 17:26

## 2019-08-31 RX ADMIN — PREGABALIN 300 MG: 150 CAPSULE ORAL at 17:20

## 2019-08-31 RX ADMIN — METHOCARBAMOL TABLETS 750 MG: 750 TABLET, COATED ORAL at 05:21

## 2019-08-31 RX ADMIN — POLYETHYLENE GLYCOL 3350 1 PACKET: 17 POWDER, FOR SOLUTION ORAL at 11:45

## 2019-08-31 RX ADMIN — CEFTRIAXONE SODIUM 2 G: 2 INJECTION, POWDER, FOR SOLUTION INTRAMUSCULAR; INTRAVENOUS at 05:18

## 2019-08-31 RX ADMIN — INSULIN HUMAN 2 UNITS: 100 INJECTION, SOLUTION PARENTERAL at 17:25

## 2019-08-31 RX ADMIN — MYCOPHENOLATE MOFETIL 1000 MG: 250 CAPSULE ORAL at 05:20

## 2019-08-31 RX ADMIN — BUSPIRONE HYDROCHLORIDE 5 MG: 10 TABLET ORAL at 17:20

## 2019-08-31 RX ADMIN — GABAPENTIN 300 MG: 300 CAPSULE ORAL at 21:09

## 2019-08-31 RX ADMIN — ALBUTEROL SULFATE 2 PUFF: 90 AEROSOL, METERED RESPIRATORY (INHALATION) at 13:32

## 2019-08-31 RX ADMIN — AZITHROMYCIN 500 MG: 250 TABLET, FILM COATED ORAL at 05:24

## 2019-08-31 RX ADMIN — ACETAMINOPHEN 650 MG: 325 TABLET, FILM COATED ORAL at 13:31

## 2019-08-31 RX ADMIN — IPRATROPIUM BROMIDE AND ALBUTEROL SULFATE 3 ML: .5; 3 SOLUTION RESPIRATORY (INHALATION) at 22:13

## 2019-08-31 RX ADMIN — PREGABALIN 300 MG: 150 CAPSULE ORAL at 05:22

## 2019-08-31 RX ADMIN — GABAPENTIN 300 MG: 300 CAPSULE ORAL at 17:21

## 2019-08-31 RX ADMIN — INSULIN HUMAN 2 UNITS: 100 INJECTION, SOLUTION PARENTERAL at 21:11

## 2019-08-31 RX ADMIN — BUDESONIDE AND FORMOTEROL FUMARATE DIHYDRATE 2 PUFF: 160; 4.5 AEROSOL RESPIRATORY (INHALATION) at 08:22

## 2019-08-31 RX ADMIN — ENOXAPARIN SODIUM 40 MG: 100 INJECTION SUBCUTANEOUS at 05:19

## 2019-08-31 RX ADMIN — METHOCARBAMOL TABLETS 750 MG: 750 TABLET, COATED ORAL at 21:08

## 2019-08-31 ASSESSMENT — ENCOUNTER SYMPTOMS
SPEECH CHANGE: 0
EYE DISCHARGE: 0
SPUTUM PRODUCTION: 1
HEMOPTYSIS: 0
PALPITATIONS: 0
DIAPHORESIS: 1
EYE REDNESS: 0
WHEEZING: 1
CHILLS: 0
MYALGIAS: 0
STRIDOR: 0
CLAUDICATION: 0
ABDOMINAL PAIN: 0
COUGH: 1
VOMITING: 0
WEAKNESS: 1
NECK PAIN: 0
HALLUCINATIONS: 0
SHORTNESS OF BREATH: 1
BACK PAIN: 1
NAUSEA: 0
FEVER: 0

## 2019-08-31 ASSESSMENT — LIFESTYLE VARIABLES: SUBSTANCE_ABUSE: 0

## 2019-08-31 NOTE — PROGRESS NOTES
Bedside report received, patient care assumed. Pt is A&Ox4, VSS, assessment complete. Tele box in place. C/o mild back pain at this time. POC reviewed and questions answered. Bed in locked and low position, fall precautions in place. Call light in reach. Educated to call for assistance.

## 2019-08-31 NOTE — CARE PLAN
Problem: Infection  Goal: Will remain free from infection  Outcome: PROGRESSING AS EXPECTED   Pt educated on importance of hand hygiene and verbalized understanding. Hand washing performed before and after patient contact. No other s/sx of infection noted.     Problem: Pain Management  Goal: Pain level will decrease to patient's comfort goal  Outcome: PROGRESSING AS EXPECTED   Discussed with patient best ways to manage pain, interventions in place. Given PRN pain medications as well as non-pharmacologic methods in place. Working to reach manageable pain level to achieve maximum comfort. Pt verbalized understanding of pain management and treatment.

## 2019-08-31 NOTE — ASSESSMENT & PLAN NOTE
No clinical sign of infection at this time to warrant type A lactic acidosis. Given her frequent nebs treatment, this is likely secondary to type B lactic acidosis which is well known to be associated with albuterol, especially if the lactic acid is being checked while patient is getting the nebulizer treatment. No signs of hypoperfusion noted and clinically patient is getting better with improvement in oxygen. Would recommend to stop trending lactic acid and monitor patient closely using clinical signs.

## 2019-08-31 NOTE — PROGRESS NOTES
Hospital Medicine Daily Progress Note    Date of Service  9/1/2019    Chief Complaint  28 y.o. female admitted 8/29/2019 with worsening shortness of breath, fatigue.    Hospital Course    History of asthma, chronic respite failure O2 dependent 2-4  L nasal cannula, morbid obesity, chronic edema ,  DM II chronic back pain,  optic neuritis on CellCept, chronic prednisone followed by Dr. Newton admitted with findings of asthma exacerbation, UTI.  Patient started on IV steroids, bronchodilator therapy.     Interval Problem Update    Overnight with findings of acute on chronic course of failure-o2 requirements remain quite high. She feels worse this AM. LA to be over 5. She thinks she might have aspirated some food this AM during breakfast since she was choking intermittently.    Consultants/Specialty   pulmonary consultation    Code Status  Full code    Disposition  TELE, very low threshold to move to the ICU    Review of Systems  Review of Systems   Constitutional: Positive for diaphoresis and malaise/fatigue. Negative for fever.        She feels worse today   HENT: Positive for congestion. Negative for hearing loss.    Eyes: Negative for discharge and redness.   Respiratory: Positive for cough, shortness of breath and wheezing. Negative for stridor.         Worsened today   Cardiovascular: Positive for leg swelling. Negative for chest pain.   Gastrointestinal: Negative for nausea and vomiting.   Musculoskeletal: Positive for back pain. Negative for joint pain.        Chronic   Neurological: Positive for weakness (Generalized). Negative for speech change.   Psychiatric/Behavioral: Negative for hallucinations and substance abuse.   All other systems reviewed and are negative.       Physical Exam  Temp:  [36.1 °C (97 °F)-36.8 °C (98.2 °F)] 36.7 °C (98.1 °F)  Pulse:  [81-98] 89  Resp:  [18-22] 20  BP: (137-151)/(68-84) 147/78  SpO2:  [95 %-97 %] 97 %    Physical Exam   Constitutional: She is oriented to person, place,  and time.   Tachypneic with exertion   HENT:   Head: Normocephalic and atraumatic.   Right Ear: External ear normal.   Left Ear: External ear normal.   Nose: Nose normal.   Eyes: EOM are normal. Right eye exhibits no discharge. Left eye exhibits no discharge.   Neck: Neck supple.   Cardiovascular:   No murmur heard.  Tachycardic, regular  2+ symmetric radial pulses  Distal extremities warm, perfusing no cyanosis   Pulmonary/Chest: No stridor. No respiratory distress. She has wheezes (Faint bilateral expiratory wheezes, diminished breath sounds at bases). She has no rales.   Diffuse, expiratory  Mild tachypnea  -appears anxious, but speaking in full sentences   Abdominal: Soft. Bowel sounds are normal. There is no tenderness.   Musculoskeletal: She exhibits edema (Generalized 2-3+, cushingoid appearance).   Peripheral line left foot   Neurological: She is alert and oriented to person, place, and time.   No gross focal weakness   Skin: Skin is warm and dry. No pallor.   Vitals reviewed.      Fluids    Intake/Output Summary (Last 24 hours) at 9/1/2019 0709  Last data filed at 9/1/2019 0639  Gross per 24 hour   Intake 790 ml   Output 1625 ml   Net -835 ml       Laboratory  Recent Labs     08/29/19  1427 08/30/19  0215 08/31/19  0422   WBC 9.8 12.0* 11.7*   RBC 4.50 3.97* 3.99*   HEMOGLOBIN 14.2 12.8 12.6   HEMATOCRIT 44.0 38.8 38.7   MCV 97.8 97.7 97.0   MCH 31.6 32.2 31.6   MCHC 32.3* 33.0* 32.6*   RDW 47.5 46.8 46.5   PLATELETCT 145* 150* 139*   MPV 11.0 11.1 11.3     Recent Labs     08/30/19  0215 08/30/19  1055 08/31/19  0422   SODIUM 135 135 137   POTASSIUM 4.4 4.5 3.9   CHLORIDE 106 106 103   CO2 16* 13* 20   GLUCOSE 178* 197* 238*   BUN 14 12 19   CREATININE 0.71 0.74 0.88   CALCIUM 9.2 9.2 9.3     Recent Labs     08/29/19  1439 08/29/19  2257   APTT 23.2*  --    INR 0.94 0.95               Imaging  DX-CHEST-PORTABLE (1 VIEW)   Final Result      No acute cardiac or pulmonary abnormalities are identified.       DX-CHEST-PORTABLE (1 VIEW)   Final Result      No acute cardiopulmonary abnormality.           Assessment/Plan  * Acute on chronic respiratory failure with hypoxia (HCC)- (present on admission)  Assessment & Plan  Suspect due to asthma exacerbation with persistent wheezing, increased tachypnea, dyspnea despite multiple bronchodilator therapy, steroids  Give IV magnesium  Continue IV Solu-Medrol, aggressive DuoNeb bronchodilator therapy  RT protocol  -doing clinically better, pulm following    Moderate persistent asthma with acute exacerbation- (present on admission)  Assessment & Plan  -clinically improving somewhat today  IV steroids, DuoNeb every 4 hours  IV magnesium  sched symbicort.  Start empiric IV Rocephin, Azithromycin  as she has tolerated these in the past ( Reviewed drug use hx w pharmacy )   -Procalc mildly elevated  -pulm following  -added singulair  -stop check LA since influenced by albuterol use      Metabolic acidosis- (present on admission)  Assessment & Plan  Due to lactic acidosis   Treat as below.    Uncontrolled type 2 diabetes mellitus with hyperglycemia (HCC)- (present on admission)  Assessment & Plan  Scheduled Lantus  Monitor serial Accu-Cheks provide sliding scale insulin coverage as needed.  Follow-up hemoglobin A1c      Optic neuritis- (present on admission)  Assessment & Plan  Follow up with Dr. Newton , neuro opth  Continue home medications     UTI (urinary tract infection)- (present on admission)  Assessment & Plan  Start Rocephin. Dc Macrobid.       Chronic pain syndrome- (present on admission)  Assessment & Plan  Continue her pain medications, psychotropics at current doses. This has been verified by pharmacy. She mentions she takes these medications and follows Pain clinic  sched robaxin, neurotin       Lactic acidosis- (present on admission)  Assessment & Plan  -as noted above, stop checking, clinically overall doing better      Morbid obesity with BMI of 45.0-49.9, adult  ([facility])- (present on admission)  Assessment & Plan  Body mass index is 46.81 kg/m².  When better, encourage healthy diet, activity    Anxiety- (present on admission)  Assessment & Plan  Continue Buspar and Prozac.    Borderline personality disorder in adult (HCC)- (present on admission)  Assessment & Plan  HX of per records.   Continue with home psychiatric meds        VTE prophylaxis: lovenox

## 2019-08-31 NOTE — CARE PLAN
Problem: Communication  Goal: The ability to communicate needs accurately and effectively will improve  Outcome: PROGRESSING AS EXPECTED   Patient educated to utilize call light. Patient and family oriented to hospital room. Patient encouraged to ask questions about plan of care. Patient effectively uses call light and is involved in POC.       Problem: Safety  Goal: Will remain free from injury  Outcome: PROGRESSING AS EXPECTED   Patient's risk for injury and falls assessed. Appropriate safety precautions in place. Patient educated to utilize call light for needs. Patient verbalizes understanding.

## 2019-08-31 NOTE — PROGRESS NOTES
Pulmonary Progress Note    Date of admission  8/29/2019    Chief Complaint  28 y.o. female admitted 8/29/2019 with acute asthma exacerbation     Hospital Course    28 y.o. female who presented 8/29/2019 with shortness of breath. Patient has a significant history of morbid obesity, severe joint arthritis with limited mobility, chronic hypoxemia on 4 liters NC, optic neuritis on chronic immunosuppressive therapy and prednisone 15 mg, and history of self reported asthma, presented with worsening shortness of breath. She was recently admitted two weeks ago for similar complaints and treated for asthma exacerbation. Patient state since discharge, she reports feeling better but noted over the past few days she started having intermittent cough and running nose. Her shortness of breath worsen and she tried using her albuterol nebulizer but has minimum improvement. She presented to the ED yesterday and was admitted for asthma exacerbation. She was started on scheduled duoneb q4hr, solumedrol 125 mg VI q6hr, and symbicort. Pulmonary is consulted for frequent asthma exacerbation.     Currently, patient reports feeling better. Cough is intermittent but usually nonproductive. Denies fever/chills, N/V, or chest pain. She reports that she was diagnosed with asthma as a child. She had PFTs done back in 2008 showing normal ratio with no evidence of bronchodilator response. She was recently diagnosed with mild sleep apnea with AHI 12.7 on 08/06 and is awaiting for sleep titration study. She is a nonsmoker and has no second hand smoke. She has one cat at home. Admits to having seasonal allergies.       Interval Problem Update  Reviewed last 24 hour events:  Oxygen down to 4 liters. Lactic acid 5.9 while patient was getting her nebulizer treatment. Patient reports feeling better. She tolerated CPAP for 4 hours last night. She still has a nonproductive cough and wheezing. Denies fever/chills, N/V, chest pain, or abdominal pain.      Review of Systems  Review of Systems   Constitutional: Negative for chills and fever.   Respiratory: Positive for cough, sputum production, shortness of breath and wheezing. Negative for hemoptysis.    Cardiovascular: Negative for chest pain, palpitations, claudication and leg swelling.   Gastrointestinal: Negative for abdominal pain, nausea and vomiting.   Genitourinary: Negative for dysuria and urgency.   Musculoskeletal: Negative for myalgias and neck pain.   Skin: Negative for rash.        Vital Signs for last 24 hours   Temp:  [36.6 °C (97.8 °F)-37.4 °C (99.3 °F)] 36.6 °C (97.8 °F)  Pulse:  [82-98] 96  Resp:  [18-24] 22  BP: (129-152)/(61-88) 138/74  SpO2:  [94 %-98 %] 95 %       Physical Exam   Physical Exam   Constitutional: She is oriented to person, place, and time. She appears well-developed and well-nourished.   HENT:   Head: Normocephalic and atraumatic.   Eyes: Pupils are equal, round, and reactive to light. EOM are normal.   Neck: Normal range of motion.   Cardiovascular: Regular rhythm, normal heart sounds and intact distal pulses. Exam reveals no friction rub.   Pulmonary/Chest: Effort normal.   Mild expiratory wheezing with good air entry. No rales or crackles.    Abdominal: Soft. Bowel sounds are normal. She exhibits no distension. There is no tenderness.   Musculoskeletal: Normal range of motion.   Neurological: She is alert and oriented to person, place, and time. No cranial nerve deficit. Coordination normal.   Skin: Skin is warm and dry. No erythema.   Psychiatric: She has a normal mood and affect.       Medications  Current Facility-Administered Medications   Medication Dose Route Frequency Provider Last Rate Last Dose   • montelukast (SINGULAIR) tablet 10 mg  10 mg Oral Nightly Estevan Hogan M.D.       • polyethylene glycol/lytes (MIRALAX) PACKET 1 Packet  1 Packet Oral DAILY Estevan Hogan M.D.       • ipratropium-albuterol (DUONEB) nebulizer solution  3 mL Nebulization Q4HRS (RT)  Dmitriy Harper M.D.       • albuterol inhaler 2 Puff  2 Puff Inhalation Q4HRS PRN Dmitriy Harper M.D.       • cefTRIAXone (ROCEPHIN) 2 g in  mL IVPB  2 g Intravenous Q24HRS Hari Bowman M.D.   Stopped at 08/31/19 0548   • azithromycin (ZITHROMAX) tablet 500 mg  500 mg Oral DAILY Hari Bowman M.D.   500 mg at 08/31/19 0524   • albuterol (PROVENTIL) 2.5mg/0.5ml nebulizer solution 2.5 mg  2.5 mg Nebulization Q4H PRN (RT) Dmitriy Harper M.D.       • aspirin EC (ECOTRIN) tablet 81 mg  81 mg Oral DAILY Dmitriy Harper M.D.   81 mg at 08/31/19 0519   • budesonide-formoterol (SYMBICORT) 160-4.5 MCG/ACT inhaler 2 Puff  2 Puff Inhalation BID (RT) Dmitriy Harper M.D.   2 Puff at 08/31/19 0822   • busPIRone (BUSPAR) tablet 5 mg  5 mg Oral BID Dmitriy Harper M.D.   5 mg at 08/31/19 0523   • FLUoxetine (PROZAC) capsule 20 mg  20 mg Oral DAILY Dmitriy Harper M.D.   20 mg at 08/31/19 0519   • senna-docusate (PERICOLACE or SENOKOT S) 8.6-50 MG per tablet 2 Tab  2 Tab Oral BID Dmitriy Harper M.D.   2 Tab at 08/31/19 0523    And   • polyethylene glycol/lytes (MIRALAX) PACKET 1 Packet  1 Packet Oral QDAY PRN Dmitriy Harper M.D.        And   • magnesium hydroxide (MILK OF MAGNESIA) suspension 30 mL  30 mL Oral QDAY PRN Dmitriy Harper M.D.        And   • bisacodyl (DULCOLAX) suppository 10 mg  10 mg Rectal QDAY PRN Dmitriy Harper M.D.       • enoxaparin (LOVENOX) inj 40 mg  40 mg Subcutaneous DAILY Dmitriy Harper M.D.   40 mg at 08/31/19 0519   • guaiFENesin dextromethorphan (ROBITUSSIN DM) 100-10 MG/5ML syrup 10 mL  10 mL Oral Q6HRS PRN Dmitriy Harper M.D.       • Respiratory Care per Protocol   Nebulization Continuous RT Dmitriy Harper M.D.       • lactobacillus rhamnosus (CULTURELLE) capsule 1 Cap  1 Cap Oral QDAY with Breakfast Dmitriy Harper M.D.   1 Cap at 08/31/19 1074   • lactated ringers infusion (BOLUS)  500 mL Intravenous Once PRN Dmitriy Harper M.D.       • insulin  regular (HUMULIN R) injection 1-6 Units  1-6 Units Subcutaneous 4X/DAY ACHS Dmitriy Harper M.D.   4 Units at 08/31/19 0826    And   • glucose 4 g chewable tablet 16 g  16 g Oral Q15 MIN PRN Dmitriy Harper M.D.        And   • DEXTROSE 10% BOLUS 250 mL  250 mL Intravenous Q15 MIN PRN Dmitriy Harper M.D.       • acetaminophen (TYLENOL) tablet 650 mg  650 mg Oral Q4HRS PRN Dmitriy Harper M.D.   650 mg at 08/31/19 0756   • methylPREDNISolone sod succ (SOLU-MEDROL) 125 MG injection 125 mg  125 mg Intravenous Q6HRS Dmitriy Harper M.D.   125 mg at 08/31/19 0520   • insulin glargine (LANTUS) injection 10 Units  10 Units Subcutaneous Q EVENING Dmitriy Harper M.D.   10 Units at 08/30/19 1701   • folic acid (FOLVITE) tablet 1 mg  1 mg Oral DAILY Dmitriy Harper M.D.   1 mg at 08/31/19 0521   • gabapentin (NEURONTIN) capsule 300 mg  300 mg Oral 4X/DAY Dmitriy Harper M.D.   300 mg at 08/31/19 0820   • ipratropium-albuterol (DUONEB) nebulizer solution  3 mL Nebulization Q6HRS PRN (RT) Dmitriy Harper M.D.       • ivabradine (CORLANOR) tablet 7.5 mg  7.5 mg Oral BID Dmitriy Harper M.D.   7.5 mg at 08/31/19 0522   • levothyroxine (SYNTHROID) tablet 75 mcg  75 mcg Oral AM ES Dmitriy Harper M.D.   75 mcg at 08/31/19 0524   • furosemide (LASIX) tablet 80 mg  80 mg Oral DAILY Dmitriy Harper M.D.   80 mg at 08/31/19 0519   • methocarbamol (ROBAXIN) tablet 750 mg  750 mg Oral TID Dmitriy Harper M.D.   750 mg at 08/31/19 0521   • mycophenolate (CELLCEPT) capsule 1,000 mg  1,000 mg Oral BID Dmitriy Harper M.D.   1,000 mg at 08/31/19 0520   • ondansetron (ZOFRAN ODT) dispertab 4 mg  4 mg Oral Q4HRS PRN Dmitriy Harper M.D.   4 mg at 08/31/19 0335   • sodium bicarbonate tablet 650 mg  650 mg Oral BID Dmitriy Harper M.D.   650 mg at 08/31/19 0520   • traZODone (DESYREL) tablet 100 mg  100 mg Oral QHS Dmitriy Harper M.D.   100 mg at 08/30/19 2121   • ziprasidone (GEODON) capsule 80 mg  80 mg Oral  BID WITH MEALS Dmitriy Harper M.D.   80 mg at 08/31/19 0756   • pregabalin (LYRICA) capsule 300 mg  300 mg Oral BID Dmitriy Harper M.D.   300 mg at 08/31/19 0522   • famotidine (PEPCID) tablet 20 mg  20 mg Oral BID Dmitriy Harper M.D.   20 mg at 08/31/19 0519   • sodium bicarbonate tablet 325 mg  325 mg Oral Daily-1400 Dmitriy Harper M.D.   325 mg at 08/30/19 1603       Fluids    Intake/Output Summary (Last 24 hours) at 8/31/2019 1103  Last data filed at 8/31/2019 0851  Gross per 24 hour   Intake 240 ml   Output 1000 ml   Net -760 ml       Laboratory  Recent Labs     08/30/19  1239   DYFBM45J 7.44   [id number] 19.2*   [id number] 159.6*   [id number] 98.7   ARTHCO3 13*   ARTBE -9*         Recent Labs     08/30/19 0215 08/30/19  1055 08/31/19  0422   SODIUM 135 135 137   POTASSIUM 4.4 4.5 3.9   CHLORIDE 106 106 103   CO2 16* 13* 20   BUN 14 12 19   CREATININE 0.71 0.74 0.88   PHOSPHORUS  --  2.2*  --    CALCIUM 9.2 9.2 9.3     Recent Labs     08/29/19 1427 08/30/19 0215 08/30/19  1055 08/31/19  0422   ALTSGPT 49  --   --   --    ASTSGOT 15  --   --   --    ALKPHOSPHAT 33  --   --   --    TBILIRUBIN 0.4  --   --   --    GLUCOSE 232* 178* 197* 238*     Recent Labs     08/29/19 1427 08/30/19 0215 08/31/19  0422   WBC 9.8 12.0* 11.7*   NEUTSPOLYS 84.20*  --  91.10*   LYMPHOCYTES 11.90*  --  5.30*   MONOCYTES 2.20  --  2.30   EOSINOPHILS 0.10  --  0.00   BASOPHILS 0.20  --  0.10   ASTSGOT 15  --   --    ALTSGPT 49  --   --    ALKPHOSPHAT 33  --   --    TBILIRUBIN 0.4  --   --      Recent Labs     08/29/19  1427 08/29/19  1439 08/29/19  2257 08/30/19  0215 08/31/19  0422   RBC 4.50  --   --  3.97* 3.99*   HEMOGLOBIN 14.2  --   --  12.8 12.6   HEMATOCRIT 44.0  --   --  38.8 38.7   PLATELETCT 145*  --   --  150* 139*   PROTHROMBTM  --  12.7 12.9  --   --    APTT  --  23.2*  --   --   --    INR  --  0.94 0.95  --   --        Imaging  No new imaging since admission    Assessment/Plan  * Acute on chronic respiratory  failure with hypoxia (HCC)  Assessment & Plan  Resolved. Back to baseline 2-4 liters oxymask.   Encourage IS and OOB as tolerated       Moderate persistent asthma with acute exacerbation  Assessment & Plan  - Clinically improving with mild wheezing with good air entry   - Cont. IV solumedrol, symbicort, singulair, and scheduled duonebs at this time  - Check daily peak flow   - Need outpatient follow up closely in the pulmonary clinic to assess for anti-IgE and anti-IL5 candidacy   - Encourage CPAP compliance       TONYA (obstructive sleep apnea)- (present on admission)  Assessment & Plan  - Encourage CPAP compliance at night   - Will need outpatient CPAP titration study once discharged       Lactic acidosis  Assessment & Plan  No clinical sign of infection at this time to warrant type A lactic acidosis. Given her frequent nebs treatment, this is likely secondary to type B lactic acidosis which is well known to be associated with albuterol, especially if the lactic acid is being checked while patient is getting the nebulizer treatment. No signs of hypoperfusion noted and clinically patient is getting better with improvement in oxygen. Would recommend to stop trending lactic acid and monitor patient closely using clinical signs.        I have performed a physical exam and reviewed and updated ROS and Plan today (8/31/2019). In review of yesterday's note (8/30/2019), there are no changes except as documented above.     Discussed patient condition and treatment plan with Dr. Tim.

## 2019-08-31 NOTE — PROGRESS NOTES
Dr. Quinonez of pulmonary notified of patient's lactic acid level of 5.9, increased respirations, shortness of breath, and chest tightness. Per MD, patient is to have lactic acid levels drawn after patient has completed breathing treatments. MD to assess patient shortly at bedside.

## 2019-08-31 NOTE — PROGRESS NOTES
Tc from Lab called with critical result of Lactic Acid at 5.9. Critical lab result read back to Tc.   Dr. Hogan notified of critical lab result at 08:30.  Critical lab result read back by Dr. Hogan.

## 2019-08-31 NOTE — CARE PLAN
Problem: Bronchoconstriction:  Goal: Improve in air movement and diminished wheezing  Outcome: PROGRESSING SLOWER THAN EXPECTED   Duoneb QID, pt requesting PRN tx an hour and half after tx was given. Pt had increased wheezing and work of breathing.

## 2019-08-31 NOTE — PROGRESS NOTES
Tc from Lab called with critical result of Lactic acid at 5.4. Critical lab result read back to Tc. Per Dr. Quinonez orders, okay to d/c lactic acid draws because of the albuterol breathing treatments are increasing the lactic acid level.

## 2019-09-01 LAB
GLUCOSE BLD-MCNC: 138 MG/DL (ref 65–99)
GLUCOSE BLD-MCNC: 140 MG/DL (ref 65–99)
GLUCOSE BLD-MCNC: 161 MG/DL (ref 65–99)
GLUCOSE BLD-MCNC: 184 MG/DL (ref 65–99)
GLUCOSE BLD-MCNC: 254 MG/DL (ref 65–99)

## 2019-09-01 PROCEDURE — 82962 GLUCOSE BLOOD TEST: CPT | Mod: 91

## 2019-09-01 PROCEDURE — 99232 SBSQ HOSP IP/OBS MODERATE 35: CPT | Performed by: INTERNAL MEDICINE

## 2019-09-01 PROCEDURE — 700111 HCHG RX REV CODE 636 W/ 250 OVERRIDE (IP): Performed by: INTERNAL MEDICINE

## 2019-09-01 PROCEDURE — 700101 HCHG RX REV CODE 250: Performed by: INTERNAL MEDICINE

## 2019-09-01 PROCEDURE — 700102 HCHG RX REV CODE 250 W/ 637 OVERRIDE(OP): Performed by: HOSPITALIST

## 2019-09-01 PROCEDURE — A9270 NON-COVERED ITEM OR SERVICE: HCPCS | Performed by: INTERNAL MEDICINE

## 2019-09-01 PROCEDURE — A9270 NON-COVERED ITEM OR SERVICE: HCPCS | Performed by: HOSPITALIST

## 2019-09-01 PROCEDURE — 700111 HCHG RX REV CODE 636 W/ 250 OVERRIDE (IP): Performed by: HOSPITALIST

## 2019-09-01 PROCEDURE — 700102 HCHG RX REV CODE 250 W/ 637 OVERRIDE(OP): Performed by: INTERNAL MEDICINE

## 2019-09-01 PROCEDURE — 700105 HCHG RX REV CODE 258: Performed by: HOSPITALIST

## 2019-09-01 PROCEDURE — 770020 HCHG ROOM/CARE - TELE (206)

## 2019-09-01 PROCEDURE — 700105 HCHG RX REV CODE 258

## 2019-09-01 PROCEDURE — 94760 N-INVAS EAR/PLS OXIMETRY 1: CPT

## 2019-09-01 PROCEDURE — 94640 AIRWAY INHALATION TREATMENT: CPT

## 2019-09-01 PROCEDURE — 94660 CPAP INITIATION&MGMT: CPT

## 2019-09-01 RX ORDER — TRAMADOL HYDROCHLORIDE 50 MG/1
50 TABLET ORAL EVERY 6 HOURS PRN
Status: DISCONTINUED | OUTPATIENT
Start: 2019-09-01 | End: 2019-09-02 | Stop reason: HOSPADM

## 2019-09-01 RX ORDER — PROCHLORPERAZINE EDISYLATE 5 MG/ML
10 INJECTION INTRAMUSCULAR; INTRAVENOUS EVERY 6 HOURS PRN
Status: DISCONTINUED | OUTPATIENT
Start: 2019-09-01 | End: 2019-09-02 | Stop reason: HOSPADM

## 2019-09-01 RX ORDER — IPRATROPIUM BROMIDE AND ALBUTEROL SULFATE 2.5; .5 MG/3ML; MG/3ML
3 SOLUTION RESPIRATORY (INHALATION)
Status: DISCONTINUED | OUTPATIENT
Start: 2019-09-02 | End: 2019-09-02 | Stop reason: HOSPADM

## 2019-09-01 RX ORDER — SODIUM CHLORIDE 9 MG/ML
INJECTION, SOLUTION INTRAVENOUS
Status: COMPLETED
Start: 2019-09-01 | End: 2019-09-02

## 2019-09-01 RX ADMIN — ONDANSETRON 4 MG: 2 INJECTION INTRAMUSCULAR; INTRAVENOUS at 03:06

## 2019-09-01 RX ADMIN — IPRATROPIUM BROMIDE AND ALBUTEROL SULFATE 3 ML: .5; 3 SOLUTION RESPIRATORY (INHALATION) at 02:35

## 2019-09-01 RX ADMIN — POLYETHYLENE GLYCOL 3350 1 PACKET: 17 POWDER, FOR SOLUTION ORAL at 05:15

## 2019-09-01 RX ADMIN — BUSPIRONE HYDROCHLORIDE 5 MG: 10 TABLET ORAL at 05:16

## 2019-09-01 RX ADMIN — SODIUM BICARBONATE 650 MG: 650 TABLET ORAL at 05:15

## 2019-09-01 RX ADMIN — PREGABALIN 300 MG: 150 CAPSULE ORAL at 17:32

## 2019-09-01 RX ADMIN — IPRATROPIUM BROMIDE AND ALBUTEROL SULFATE 3 ML: .5; 3 SOLUTION RESPIRATORY (INHALATION) at 16:09

## 2019-09-01 RX ADMIN — Medication 1 CAPSULE: at 08:32

## 2019-09-01 RX ADMIN — GABAPENTIN 300 MG: 300 CAPSULE ORAL at 21:28

## 2019-09-01 RX ADMIN — GABAPENTIN 300 MG: 300 CAPSULE ORAL at 17:30

## 2019-09-01 RX ADMIN — FAMOTIDINE 20 MG: 20 TABLET ORAL at 17:30

## 2019-09-01 RX ADMIN — METHOCARBAMOL TABLETS 750 MG: 750 TABLET, COATED ORAL at 15:09

## 2019-09-01 RX ADMIN — SENNOSIDES, DOCUSATE SODIUM 2 TABLET: 50; 8.6 TABLET, FILM COATED ORAL at 05:13

## 2019-09-01 RX ADMIN — ACETAMINOPHEN 650 MG: 325 TABLET, FILM COATED ORAL at 05:13

## 2019-09-01 RX ADMIN — FLUOXETINE HYDROCHLORIDE 20 MG: 20 CAPSULE ORAL at 05:15

## 2019-09-01 RX ADMIN — IVABRADINE 7.5 MG: 5 TABLET, FILM COATED ORAL at 17:31

## 2019-09-01 RX ADMIN — PREGABALIN 300 MG: 150 CAPSULE ORAL at 05:14

## 2019-09-01 RX ADMIN — ZIPRASIDONE HYDROCHLORIDE 80 MG: 80 CAPSULE ORAL at 17:31

## 2019-09-01 RX ADMIN — TRAZODONE HYDROCHLORIDE 100 MG: 50 TABLET ORAL at 21:28

## 2019-09-01 RX ADMIN — SENNOSIDES, DOCUSATE SODIUM 2 TABLET: 50; 8.6 TABLET, FILM COATED ORAL at 17:32

## 2019-09-01 RX ADMIN — SODIUM BICARBONATE 325 MG: 650 TABLET ORAL at 15:09

## 2019-09-01 RX ADMIN — SODIUM BICARBONATE 325 MG: 650 TABLET ORAL at 17:34

## 2019-09-01 RX ADMIN — FOLIC ACID 1 MG: 1 TABLET ORAL at 05:15

## 2019-09-01 RX ADMIN — ONDANSETRON 4 MG: 2 INJECTION INTRAMUSCULAR; INTRAVENOUS at 17:27

## 2019-09-01 RX ADMIN — BUDESONIDE AND FORMOTEROL FUMARATE DIHYDRATE 2 PUFF: 160; 4.5 AEROSOL RESPIRATORY (INHALATION) at 07:33

## 2019-09-01 RX ADMIN — IPRATROPIUM BROMIDE AND ALBUTEROL SULFATE 3 ML: .5; 3 SOLUTION RESPIRATORY (INHALATION) at 07:30

## 2019-09-01 RX ADMIN — GABAPENTIN 300 MG: 300 CAPSULE ORAL at 12:10

## 2019-09-01 RX ADMIN — IPRATROPIUM BROMIDE AND ALBUTEROL SULFATE 3 ML: .5; 3 SOLUTION RESPIRATORY (INHALATION) at 19:21

## 2019-09-01 RX ADMIN — BUDESONIDE AND FORMOTEROL FUMARATE DIHYDRATE 2 PUFF: 160; 4.5 AEROSOL RESPIRATORY (INHALATION) at 21:28

## 2019-09-01 RX ADMIN — FUROSEMIDE 80 MG: 40 TABLET ORAL at 05:16

## 2019-09-01 RX ADMIN — MYCOPHENOLATE MOFETIL 1000 MG: 250 CAPSULE ORAL at 05:16

## 2019-09-01 RX ADMIN — BUSPIRONE HYDROCHLORIDE 5 MG: 10 TABLET ORAL at 17:30

## 2019-09-01 RX ADMIN — CEFTRIAXONE SODIUM 2 G: 2 INJECTION, POWDER, FOR SOLUTION INTRAMUSCULAR; INTRAVENOUS at 05:05

## 2019-09-01 RX ADMIN — PROCHLORPERAZINE EDISYLATE 10 MG: 5 INJECTION INTRAMUSCULAR; INTRAVENOUS at 09:28

## 2019-09-01 RX ADMIN — ACETAMINOPHEN 650 MG: 325 TABLET, FILM COATED ORAL at 12:10

## 2019-09-01 RX ADMIN — FAMOTIDINE 20 MG: 20 TABLET ORAL at 05:13

## 2019-09-01 RX ADMIN — ASPIRIN 81 MG: 81 TABLET, COATED ORAL at 05:13

## 2019-09-01 RX ADMIN — METHYLPREDNISOLONE SODIUM SUCCINATE 125 MG: 125 INJECTION, POWDER, FOR SOLUTION INTRAMUSCULAR; INTRAVENOUS at 05:13

## 2019-09-01 RX ADMIN — MYCOPHENOLATE MOFETIL 1000 MG: 250 CAPSULE ORAL at 17:32

## 2019-09-01 RX ADMIN — METHOCARBAMOL TABLETS 750 MG: 750 TABLET, COATED ORAL at 05:13

## 2019-09-01 RX ADMIN — IPRATROPIUM BROMIDE AND ALBUTEROL SULFATE 3 ML: .5; 3 SOLUTION RESPIRATORY (INHALATION) at 12:13

## 2019-09-01 RX ADMIN — INSULIN HUMAN 3 UNITS: 100 INJECTION, SOLUTION PARENTERAL at 12:24

## 2019-09-01 RX ADMIN — MONTELUKAST 10 MG: 10 TABLET, FILM COATED ORAL at 21:28

## 2019-09-01 RX ADMIN — SODIUM CHLORIDE 250 ML: 9 INJECTION, SOLUTION INTRAVENOUS at 05:01

## 2019-09-01 RX ADMIN — ENOXAPARIN SODIUM 40 MG: 100 INJECTION SUBCUTANEOUS at 05:15

## 2019-09-01 RX ADMIN — INSULIN HUMAN 1 UNITS: 100 INJECTION, SOLUTION PARENTERAL at 08:32

## 2019-09-01 RX ADMIN — ZIPRASIDONE HYDROCHLORIDE 80 MG: 80 CAPSULE ORAL at 08:32

## 2019-09-01 RX ADMIN — IVABRADINE 7.5 MG: 5 TABLET, FILM COATED ORAL at 05:15

## 2019-09-01 RX ADMIN — TRAMADOL HYDROCHLORIDE 50 MG: 50 TABLET, FILM COATED ORAL at 17:30

## 2019-09-01 RX ADMIN — GABAPENTIN 300 MG: 300 CAPSULE ORAL at 08:32

## 2019-09-01 RX ADMIN — AZITHROMYCIN 500 MG: 250 TABLET, FILM COATED ORAL at 05:13

## 2019-09-01 RX ADMIN — TRAMADOL HYDROCHLORIDE 50 MG: 50 TABLET, FILM COATED ORAL at 09:28

## 2019-09-01 RX ADMIN — METHOCARBAMOL TABLETS 750 MG: 750 TABLET, COATED ORAL at 21:28

## 2019-09-01 RX ADMIN — LEVOTHYROXINE SODIUM 75 MCG: 75 TABLET ORAL at 05:13

## 2019-09-01 ASSESSMENT — ENCOUNTER SYMPTOMS
WHEEZING: 1
FEVER: 0
BACK PAIN: 1
NECK PAIN: 0
HEMOPTYSIS: 0
EYE REDNESS: 0
SPEECH CHANGE: 0
SPUTUM PRODUCTION: 1
HALLUCINATIONS: 0
VOMITING: 0
DIAPHORESIS: 0
COUGH: 1
WEAKNESS: 1
HEADACHES: 0
EYE DISCHARGE: 0
NAUSEA: 0
PALPITATIONS: 0
CLAUDICATION: 0
DIZZINESS: 0
CHILLS: 0
MYALGIAS: 0
SHORTNESS OF BREATH: 1
ABDOMINAL PAIN: 0

## 2019-09-01 ASSESSMENT — LIFESTYLE VARIABLES: SUBSTANCE_ABUSE: 0

## 2019-09-01 NOTE — PROGRESS NOTES
Assumed care of patient. A&O x4. 3L O2. On Tele. sleeping in bed during bedside report. Call light within reach. Bed locked and in lowest position. Labs reviewed. hourly rounding throughout shift.

## 2019-09-01 NOTE — PROGRESS NOTES
Received Bedside report. Assumed care at 0700. This pt is AOx4, ambulatory with SBA, voiding adequately, reports no pain currently, Norco given last night for pain. Patient and RN discussed plan of care: questions answered. Labs noted, assessment complete, patient tolerating regular diet. Tele box in place. Pt is on 3L of O2 via NC. Call light in place, fall precautions in place, patient educated on importance of calling for assistance. No additional needs at this time. VSS

## 2019-09-01 NOTE — CARE PLAN
Problem: Communication  Goal: The ability to communicate needs accurately and effectively will improve  Outcome: PROGRESSING AS EXPECTED     Problem: Safety  Goal: Will remain free from injury  Outcome: PROGRESSING AS EXPECTED  Goal: Will remain free from falls  Outcome: PROGRESSING AS EXPECTED     Problem: Infection  Goal: Will remain free from infection  Outcome: PROGRESSING AS EXPECTED     Problem: Venous Thromboembolism (VTW)/Deep Vein Thrombosis (DVT) Prevention:  Goal: Patient will participate in Venous Thrombosis (VTE)/Deep Vein Thrombosis (DVT)Prevention Measures  Outcome: PROGRESSING AS EXPECTED     Problem: Bowel/Gastric:  Goal: Will not experience complications related to bowel motility  Outcome: PROGRESSING AS EXPECTED     Problem: Knowledge Deficit  Goal: Knowledge of disease process/condition, treatment plan, diagnostic tests, and medications will improve  Outcome: PROGRESSING AS EXPECTED  Goal: Knowledge of the prescribed therapeutic regimen will improve  Outcome: PROGRESSING AS EXPECTED     Problem: Discharge Barriers/Planning  Goal: Patient's continuum of care needs will be met  Outcome: PROGRESSING AS EXPECTED     Problem: Pain Management  Goal: Pain level will decrease to patient's comfort goal  Outcome: PROGRESSING AS EXPECTED     Problem: Respiratory:  Goal: Respiratory status will improve  Outcome: PROGRESSING AS EXPECTED     Problem: Fluid Volume:  Goal: Will maintain balanced intake and output  Outcome: PROGRESSING AS EXPECTED     Problem: Skin Integrity  Goal: Risk for impaired skin integrity will decrease  Outcome: PROGRESSING AS EXPECTED     Problem: Psychosocial Needs:  Goal: Level of anxiety will decrease  Outcome: PROGRESSING AS EXPECTED     Problem: Urinary Elimination:  Goal: Ability to reestablish a normal urinary elimination pattern will improve  Outcome: PROGRESSING AS EXPECTED

## 2019-09-01 NOTE — PROGRESS NOTES
Pulmonary Progress Note    Date of admission  8/29/2019    Chief Complaint  28 y.o. female admitted 8/29/2019 with acute asthma exacerbation     Hospital Course    28 y.o. female who presented 8/29/2019 with shortness of breath. Patient has a significant history of morbid obesity, severe joint arthritis with limited mobility, chronic hypoxemia on 4 liters NC, optic neuritis on chronic immunosuppressive therapy and prednisone 15 mg, and history of self reported asthma, presented with worsening shortness of breath. She was recently admitted two weeks ago for similar complaints and treated for asthma exacerbation. Patient state since discharge, she reports feeling better but noted over the past few days she started having intermittent cough and running nose. Her shortness of breath worsen and she tried using her albuterol nebulizer but has minimum improvement. She presented to the ED yesterday and was admitted for asthma exacerbation. She was started on scheduled duoneb q4hr, solumedrol 125 mg VI q6hr, and symbicort. Pulmonary is consulted for frequent asthma exacerbation.     Currently, patient reports feeling better. Cough is intermittent but usually nonproductive. Denies fever/chills, N/V, or chest pain. She reports that she was diagnosed with asthma as a child. She had PFTs done back in 2008 showing normal ratio with no evidence of bronchodilator response. She was recently diagnosed with mild sleep apnea with AHI 12.7 on 08/06 and is awaiting for sleep titration study. She is a nonsmoker and has no second hand smoke. She has one cat at home. Admits to having seasonal allergies.       Interval Problem Update  Reviewed last 24 hour events:  Oxygen down to 4 liters. No acute issues overnight. Patient feels a lot better today compare to yesterday. She still exhibit intermittent productive cough consisting of yellow phlegm and wheezing.  Denies chest pain, N/V, fever/chills, abdominal pain.     Review of Systems  Review  of Systems   Constitutional: Negative for chills and fever.   Respiratory: Positive for cough, sputum production, shortness of breath and wheezing. Negative for hemoptysis.    Cardiovascular: Negative for chest pain, palpitations, claudication and leg swelling.   Gastrointestinal: Negative for abdominal pain, nausea and vomiting.   Genitourinary: Negative for dysuria and urgency.   Musculoskeletal: Negative for myalgias and neck pain.   Skin: Negative for rash.        Vital Signs for last 24 hours   Temp:  [36.1 °C (97 °F)-36.8 °C (98.2 °F)] 36.7 °C (98.1 °F)  Pulse:  [81-96] 88  Resp:  [18-22] 18  BP: (137-151)/(68-84) 147/78  SpO2:  [95 %-97 %] 96 %       Physical Exam   Physical Exam   Constitutional: She is oriented to person, place, and time. She appears well-developed and well-nourished.   HENT:   Head: Normocephalic and atraumatic.   Eyes: Pupils are equal, round, and reactive to light. EOM are normal.   Neck: Normal range of motion.   Cardiovascular: Normal rate, regular rhythm, normal heart sounds and intact distal pulses. Exam reveals no gallop and no friction rub.   No murmur heard.  Pulmonary/Chest: Effort normal.   Good air entry with no wheezing noted. No rales or ronchi.    Abdominal: Soft. Bowel sounds are normal. She exhibits no distension. There is no tenderness.   Musculoskeletal: Normal range of motion.   Neurological: She is alert and oriented to person, place, and time. No cranial nerve deficit. Coordination normal.   Skin: Skin is warm and dry. No erythema.   Psychiatric: She has a normal mood and affect.       Medications  Current Facility-Administered Medications   Medication Dose Route Frequency Provider Last Rate Last Dose   • tramadol (ULTRAM) 50 MG tablet 50 mg  50 mg Oral Q6HRS PRN Estevan Hogan M.D.       • prochlorperazine (COMPAZINE) injection 10 mg  10 mg Intravenous Q6HRS PRN Estevan Hogan M.D.       • montelukast (SINGULAIR) tablet 10 mg  10 mg Oral Nightly Estevan NAPIER  RUFUS Hogan   10 mg at 08/31/19 2109   • polyethylene glycol/lytes (MIRALAX) PACKET 1 Packet  1 Packet Oral DAILY Estevan Hogan M.D.   1 Packet at 09/01/19 0515   • ipratropium-albuterol (DUONEB) nebulizer solution  3 mL Nebulization Q4HRS (RT) Dmitryi Harper M.D.   3 mL at 09/01/19 0730   • albuterol inhaler 2 Puff  2 Puff Inhalation Q4HRS PRN Dmitriy Harper M.D.   2 Puff at 08/31/19 1332   • ondansetron (ZOFRAN) syringe/vial injection 4 mg  4 mg Intravenous Q6HRS PRN Estevan Hogan M.D.   4 mg at 09/01/19 0306   • cefTRIAXone (ROCEPHIN) 2 g in  mL IVPB  2 g Intravenous Q24HRS Hari Bowman M.D.   Stopped at 09/01/19 0535   • albuterol (PROVENTIL) 2.5mg/0.5ml nebulizer solution 2.5 mg  2.5 mg Nebulization Q4H PRN (RT) Dmitriy Harper M.D.       • aspirin EC (ECOTRIN) tablet 81 mg  81 mg Oral DAILY Dmitriy Harper M.D.   81 mg at 09/01/19 0513   • budesonide-formoterol (SYMBICORT) 160-4.5 MCG/ACT inhaler 2 Puff  2 Puff Inhalation BID (RT) Dmitriy Harper M.D.   2 Puff at 09/01/19 0733   • busPIRone (BUSPAR) tablet 5 mg  5 mg Oral BID Dmitriy Harper M.D.   5 mg at 09/01/19 0516   • FLUoxetine (PROZAC) capsule 20 mg  20 mg Oral DAILY Dmitriy Harper M.D.   20 mg at 09/01/19 0515   • senna-docusate (PERICOLACE or SENOKOT S) 8.6-50 MG per tablet 2 Tab  2 Tab Oral BID Dmitriy Harper M.D.   2 Tab at 09/01/19 0513    And   • polyethylene glycol/lytes (MIRALAX) PACKET 1 Packet  1 Packet Oral QDAY PRN Dmitriy Harper M.D.   1 Packet at 08/31/19 1936    And   • magnesium hydroxide (MILK OF MAGNESIA) suspension 30 mL  30 mL Oral QDAY PRN Dmitriy Harper M.D.   30 mL at 08/31/19 2301    And   • bisacodyl (DULCOLAX) suppository 10 mg  10 mg Rectal QDAY PRN Dmitriy Harper M.D.       • enoxaparin (LOVENOX) inj 40 mg  40 mg Subcutaneous DAILY Dmitriy Harper M.D.   40 mg at 09/01/19 0515   • guaiFENesin dextromethorphan (ROBITUSSIN DM) 100-10 MG/5ML syrup 10 mL  10 mL Oral Q6HRS PRN  Dmitriy Harper M.D.       • Respiratory Care per Protocol   Nebulization Continuous RT Dmitriy Harper M.D.       • lactobacillus rhamnosus (CULTURELLE) capsule 1 Cap  1 Cap Oral QDAY with Breakfast Dmitriy Harper M.D.   1 Cap at 09/01/19 0832   • lactated ringers infusion (BOLUS)  500 mL Intravenous Once PRN Dmitriy Harepr M.D.       • insulin regular (HUMULIN R) injection 1-6 Units  1-6 Units Subcutaneous 4X/DAY ACHS Dmitriy Harper M.D.   1 Units at 09/01/19 0832    And   • glucose 4 g chewable tablet 16 g  16 g Oral Q15 MIN PRN Dmitriy Harper M.D.        And   • DEXTROSE 10% BOLUS 250 mL  250 mL Intravenous Q15 MIN PRN Dmitriy Harper M.D.       • acetaminophen (TYLENOL) tablet 650 mg  650 mg Oral Q4HRS PRN Dmitriy Harper M.D.   650 mg at 09/01/19 0513   • methylPREDNISolone sod succ (SOLU-MEDROL) 125 MG injection 125 mg  125 mg Intravenous Q6HRS Dmitriy Harper M.D.   125 mg at 09/01/19 0513   • insulin glargine (LANTUS) injection 10 Units  10 Units Subcutaneous Q EVENING Dmitriy Harper M.D.   10 Units at 08/31/19 1726   • folic acid (FOLVITE) tablet 1 mg  1 mg Oral DAILY Dmitriy Harper M.D.   1 mg at 09/01/19 0515   • gabapentin (NEURONTIN) capsule 300 mg  300 mg Oral 4X/DAY Dmitriy Harpre M.D.   300 mg at 09/01/19 0832   • ipratropium-albuterol (DUONEB) nebulizer solution  3 mL Nebulization Q6HRS PRN (RT) Dmitriy Harper M.D.       • ivabradine (CORLANOR) tablet 7.5 mg  7.5 mg Oral BID Dmitriy Harper M.D.   7.5 mg at 09/01/19 0515   • levothyroxine (SYNTHROID) tablet 75 mcg  75 mcg Oral AM ES Dmitriy Harper M.D.   75 mcg at 09/01/19 0513   • furosemide (LASIX) tablet 80 mg  80 mg Oral DAILY Dmitriy Harper M.D.   80 mg at 09/01/19 0516   • methocarbamol (ROBAXIN) tablet 750 mg  750 mg Oral TID Dmitriy Harper M.D.   750 mg at 09/01/19 0513   • mycophenolate (CELLCEPT) capsule 1,000 mg  1,000 mg Oral BID Dmitriy Harper M.D.   1,000 mg at 09/01/19 0516   •  ondansetron (ZOFRAN ODT) dispertab 4 mg  4 mg Oral Q4HRS PRN Dmitriy Harper M.D.   4 mg at 08/31/19 1536   • sodium bicarbonate tablet 650 mg  650 mg Oral BID Dmitriy Harper M.D.   650 mg at 09/01/19 0515   • traZODone (DESYREL) tablet 100 mg  100 mg Oral QHS Dmitriy Harper M.D.   100 mg at 08/31/19 2109   • ziprasidone (GEODON) capsule 80 mg  80 mg Oral BID WITH MEALS Dmitriy Harper M.D.   80 mg at 09/01/19 0832   • pregabalin (LYRICA) capsule 300 mg  300 mg Oral BID Dmitriy Harper M.D.   300 mg at 09/01/19 0514   • famotidine (PEPCID) tablet 20 mg  20 mg Oral BID Dmitriy Harper M.D.   20 mg at 09/01/19 0513   • sodium bicarbonate tablet 325 mg  325 mg Oral Daily-1400 Dmitriy Harper M.D.   325 mg at 08/31/19 1330       Fluids    Intake/Output Summary (Last 24 hours) at 9/1/2019 0841  Last data filed at 9/1/2019 0639  Gross per 24 hour   Intake 790 ml   Output 1275 ml   Net -485 ml       Laboratory  Recent Labs     08/30/19  1239   IMJIR62V 7.44   [id number] 19.2*   [id number] 159.6*   [id number] 98.7   ARTHCO3 13*   ARTBE -9*         Recent Labs     08/30/19  0215 08/30/19  1055 08/31/19  0422   SODIUM 135 135 137   POTASSIUM 4.4 4.5 3.9   CHLORIDE 106 106 103   CO2 16* 13* 20   BUN 14 12 19   CREATININE 0.71 0.74 0.88   PHOSPHORUS  --  2.2*  --    CALCIUM 9.2 9.2 9.3     Recent Labs     08/29/19  1427 08/30/19  0215 08/30/19  1055 08/31/19  0422   ALTSGPT 49  --   --   --    ASTSGOT 15  --   --   --    ALKPHOSPHAT 33  --   --   --    TBILIRUBIN 0.4  --   --   --    GLUCOSE 232* 178* 197* 238*     Recent Labs     08/29/19  1427 08/30/19  0215 08/31/19  0422   WBC 9.8 12.0* 11.7*   NEUTSPOLYS 84.20*  --  91.10*   LYMPHOCYTES 11.90*  --  5.30*   MONOCYTES 2.20  --  2.30   EOSINOPHILS 0.10  --  0.00   BASOPHILS 0.20  --  0.10   ASTSGOT 15  --   --    ALTSGPT 49  --   --    ALKPHOSPHAT 33  --   --    TBILIRUBIN 0.4  --   --      Recent Labs     08/29/19  1427 08/29/19  1439 08/29/19  9509  08/30/19  0215 08/31/19  0422   RBC 4.50  --   --  3.97* 3.99*   HEMOGLOBIN 14.2  --   --  12.8 12.6   HEMATOCRIT 44.0  --   --  38.8 38.7   PLATELETCT 145*  --   --  150* 139*   PROTHROMBTM  --  12.7 12.9  --   --    APTT  --  23.2*  --   --   --    INR  --  0.94 0.95  --   --        Imaging  No new imaging since admission    Assessment/Plan  * Acute on chronic respiratory failure with hypoxia (HCC)- (present on admission)  Assessment & Plan  Resolved. Back to baseline 2-4 liters oxymask.   Encourage IS and OOB as tolerated       Moderate persistent asthma with acute exacerbation- (present on admission)  Assessment & Plan  - Clinically improving with no wheezing noted and good air entry   - Cont. Steroids, symbicort, singulair, and scheduled duonebs at this time  - Check daily peak flow   - If discharged, she will need slow two weeks prednisone taper regimen and taper it down to 15 mg which is her baseline steroid dose for her optic neuritis. Also please discharge her on these asthma regimen - spiriva, symbicort, and singulair.   - Scheduled to follow up in Pulmonary clinic on 09/05  - Need outpatient follow up closely in the pulmonary clinic to assess for anti-IgE and anti-IL5 candidacy once off all steroids   - Encourage CPAP compliance       TONYA (obstructive sleep apnea)- (present on admission)  Assessment & Plan  - Encourage CPAP compliance use   - Will need outpatient CPAP titration study once discharged       I have performed a physical exam and reviewed and updated ROS and Plan today (9/1/2019). In review of yesterday's note (8/31/2019), there are no changes except as documented above.     Discussed patient condition and treatment plan with Dr. Tim.

## 2019-09-01 NOTE — PROGRESS NOTES
Hospital Medicine Daily Progress Note    Date of Service  9/1/2019    Chief Complaint  28 y.o. female admitted 8/29/2019 with worsening shortness of breath, fatigue.    Hospital Course    History of asthma, chronic respite failure O2 dependent 2-4  L nasal cannula, morbid obesity, chronic edema ,  DM II chronic back pain,  optic neuritis on CellCept, chronic prednisone followed by Dr. Newton admitted with findings of asthma exacerbation, UTI.  Patient started on IV steroids, bronchodilator therapy.     Interval Problem Update    Asthma exacerbation-feels better today. Pain control is better. Breathing is much improved now.     Consultants/Specialty   pulmonary consultation    Code Status  Full code    Disposition  TELE and then eventually home    Review of Systems  Review of Systems   Constitutional: Negative for chills, diaphoresis and malaise/fatigue.        Feels better today   HENT: Positive for congestion (improved). Negative for hearing loss and tinnitus.    Eyes: Negative for discharge and redness.   Respiratory: Positive for cough, shortness of breath and wheezing.         Improved today   Cardiovascular: Negative for palpitations and leg swelling.   Gastrointestinal: Negative for nausea and vomiting.   Musculoskeletal: Positive for back pain. Negative for joint pain.        Chronic   Neurological: Positive for weakness (Generalized). Negative for dizziness, speech change and headaches.   Psychiatric/Behavioral: Negative for hallucinations and substance abuse.   All other systems reviewed and are negative.       Physical Exam  Temp:  [36.1 °C (97 °F)-36.8 °C (98.2 °F)] 36.7 °C (98.1 °F)  Pulse:  [81-94] 94  Resp:  [17-20] 17  BP: (137-151)/(68-84) 140/79  SpO2:  [95 %-97 %] 97 %    Physical Exam   Constitutional: She is oriented to person, place, and time.   Sitting upright on commode speaking in full sentences, much more comfortable appearing today   HENT:   Head: Normocephalic and atraumatic.   Right  Ear: External ear normal.   Left Ear: External ear normal.   Nose: Nose normal.   Eyes: EOM are normal. No scleral icterus.   Neck: Neck supple.   Cardiovascular:   No murmur heard.  Tachycardic, regular  2+ symmetric radial pulses  Distal extremities warm, perfusing no cyanosis   Pulmonary/Chest: No stridor. No respiratory distress. She has wheezes (Faint bilateral expiratory wheezes, diminished breath sounds at bases). She has no rales.   Improved wheezing today   Abdominal: Soft. Bowel sounds are normal. She exhibits no distension.   Musculoskeletal: She exhibits edema (Generalized 2-3+, cushingoid appearance). She exhibits no tenderness.   Peripheral line left foot   Neurological: She is alert and oriented to person, place, and time.   No gross focal weakness   Skin: Skin is warm and dry. No pallor.   Vitals reviewed.      Fluids    Intake/Output Summary (Last 24 hours) at 9/1/2019 1142  Last data filed at 9/1/2019 0900  Gross per 24 hour   Intake 750 ml   Output 1450 ml   Net -700 ml       Laboratory  Recent Labs     08/29/19  1427 08/30/19  0215 08/31/19  0422   WBC 9.8 12.0* 11.7*   RBC 4.50 3.97* 3.99*   HEMOGLOBIN 14.2 12.8 12.6   HEMATOCRIT 44.0 38.8 38.7   MCV 97.8 97.7 97.0   MCH 31.6 32.2 31.6   MCHC 32.3* 33.0* 32.6*   RDW 47.5 46.8 46.5   PLATELETCT 145* 150* 139*   MPV 11.0 11.1 11.3     Recent Labs     08/30/19  0215 08/30/19  1055 08/31/19  0422   SODIUM 135 135 137   POTASSIUM 4.4 4.5 3.9   CHLORIDE 106 106 103   CO2 16* 13* 20   GLUCOSE 178* 197* 238*   BUN 14 12 19   CREATININE 0.71 0.74 0.88   CALCIUM 9.2 9.2 9.3     Recent Labs     08/29/19  1439 08/29/19  2257   APTT 23.2*  --    INR 0.94 0.95               Imaging  DX-CHEST-PORTABLE (1 VIEW)   Final Result      No acute cardiac or pulmonary abnormalities are identified.      DX-CHEST-PORTABLE (1 VIEW)   Final Result      No acute cardiopulmonary abnormality.           Assessment/Plan  * Acute on chronic respiratory failure with hypoxia  ([facility])- (present on admission)  Assessment & Plan  Suspect due to asthma exacerbation with persistent wheezing, increased tachypnea, dyspnea despite multiple bronchodilator therapy, steroids  Give IV magnesium  Continue IV Solu-Medrol, aggressive DuoNeb bronchodilator therapy  RT protocol  -doing clinically better and nearly back to her baseline, pulm following    Moderate persistent asthma with acute exacerbation- (present on admission)  Assessment & Plan  -continues to improve even further today  IV steroids, DuoNeb every 4 hours  IV magnesium  sched symbicort.  Start empiric IV Rocephin, Azithromycin  as she has tolerated these in the past ( Reviewed drug use hx w pharmacy )   -Procalc mildly elevated  -pulm following  -added singulair  -stop check LA since influenced by albuterol use  -will do slow prednisone taper at home      Metabolic acidosis- (present on admission)  Assessment & Plan  Due to lactic acidosis   Treat as below.    Uncontrolled type 2 diabetes mellitus with hyperglycemia ([facility])- (present on admission)  Assessment & Plan  Scheduled Lantus  Monitor serial Accu-Cheks provide sliding scale insulin coverage as needed.  Follow-up hemoglobin A1c  -sugars are doing ok while on increased dose of steroids      Optic neuritis- (present on admission)  Assessment & Plan  Follow up with Dr. Newton , neuro opth  Continue home medications     UTI (urinary tract infection)- (present on admission)  Assessment & Plan  Start Rocephin. Dc Macrobid.       Chronic pain syndrome- (present on admission)  Assessment & Plan  Continue her pain medications, psychotropics at current doses. This has been verified by pharmacy. She mentions she takes these medications and follows Pain clinic  sched robaxin, neurotin       Lactic acidosis- (present on admission)  Assessment & Plan  -as noted above, stop checking, clinically overall doing better      Morbid obesity with BMI of 45.0-49.9, adult ([facility])- (present on  admission)  Assessment & Plan  Body mass index is 46.81 kg/m².  When better, encourage healthy diet, activity    Anxiety- (present on admission)  Assessment & Plan  Continue Buspar and Prozac.    Borderline personality disorder in adult (HCC)- (present on admission)  Assessment & Plan  HX of per records.   Continue with home psychiatric meds        VTE prophylaxis: lovenox

## 2019-09-02 VITALS
HEART RATE: 88 BPM | TEMPERATURE: 97.8 F | BODY MASS INDEX: 47.09 KG/M2 | HEIGHT: 65 IN | DIASTOLIC BLOOD PRESSURE: 72 MMHG | WEIGHT: 282.63 LBS | OXYGEN SATURATION: 92 % | SYSTOLIC BLOOD PRESSURE: 126 MMHG | RESPIRATION RATE: 19 BRPM

## 2019-09-02 PROBLEM — E87.20 METABOLIC ACIDOSIS: Status: RESOLVED | Noted: 2019-08-30 | Resolved: 2019-09-02

## 2019-09-02 PROBLEM — H46.9 OPTIC NEURITIS: Status: RESOLVED | Noted: 2019-05-27 | Resolved: 2019-09-02

## 2019-09-02 PROBLEM — N39.0 UTI (URINARY TRACT INFECTION): Status: RESOLVED | Noted: 2017-05-13 | Resolved: 2019-09-02

## 2019-09-02 PROBLEM — J45.41 MODERATE PERSISTENT ASTHMA WITH ACUTE EXACERBATION: Status: RESOLVED | Noted: 2019-08-14 | Resolved: 2019-09-02

## 2019-09-02 PROBLEM — J96.21 ACUTE ON CHRONIC RESPIRATORY FAILURE WITH HYPOXIA (HCC): Status: RESOLVED | Noted: 2019-08-30 | Resolved: 2019-09-02

## 2019-09-02 LAB — GLUCOSE BLD-MCNC: 98 MG/DL (ref 65–99)

## 2019-09-02 PROCEDURE — A9270 NON-COVERED ITEM OR SERVICE: HCPCS | Performed by: INTERNAL MEDICINE

## 2019-09-02 PROCEDURE — 99239 HOSP IP/OBS DSCHRG MGMT >30: CPT | Performed by: INTERNAL MEDICINE

## 2019-09-02 PROCEDURE — 94640 AIRWAY INHALATION TREATMENT: CPT

## 2019-09-02 PROCEDURE — 700111 HCHG RX REV CODE 636 W/ 250 OVERRIDE (IP): Performed by: HOSPITALIST

## 2019-09-02 PROCEDURE — 700111 HCHG RX REV CODE 636 W/ 250 OVERRIDE (IP): Performed by: INTERNAL MEDICINE

## 2019-09-02 PROCEDURE — 700102 HCHG RX REV CODE 250 W/ 637 OVERRIDE(OP): Performed by: INTERNAL MEDICINE

## 2019-09-02 PROCEDURE — 82962 GLUCOSE BLOOD TEST: CPT

## 2019-09-02 PROCEDURE — 700101 HCHG RX REV CODE 250: Performed by: INTERNAL MEDICINE

## 2019-09-02 PROCEDURE — 700105 HCHG RX REV CODE 258: Performed by: HOSPITALIST

## 2019-09-02 RX ORDER — TIOTROPIUM BROMIDE 18 UG/1
18 CAPSULE ORAL; RESPIRATORY (INHALATION) DAILY
Qty: 30 CAP | Refills: 3 | Status: SHIPPED | OUTPATIENT
Start: 2019-09-02 | End: 2019-09-06 | Stop reason: SDUPTHER

## 2019-09-02 RX ORDER — MONTELUKAST SODIUM 10 MG/1
10 TABLET ORAL DAILY
Qty: 30 TAB | Refills: 1 | Status: SHIPPED | OUTPATIENT
Start: 2019-09-02 | End: 2019-09-06 | Stop reason: SDUPTHER

## 2019-09-02 RX ORDER — PREDNISONE 20 MG/1
TABLET ORAL
Qty: 30 TAB | Refills: 0 | Status: SHIPPED | OUTPATIENT
Start: 2019-09-02 | End: 2019-09-25

## 2019-09-02 RX ADMIN — ZIPRASIDONE HYDROCHLORIDE 80 MG: 80 CAPSULE ORAL at 07:25

## 2019-09-02 RX ADMIN — PREGABALIN 300 MG: 150 CAPSULE ORAL at 05:10

## 2019-09-02 RX ADMIN — GABAPENTIN 300 MG: 300 CAPSULE ORAL at 07:25

## 2019-09-02 RX ADMIN — METHOCARBAMOL TABLETS 750 MG: 750 TABLET, COATED ORAL at 05:14

## 2019-09-02 RX ADMIN — IPRATROPIUM BROMIDE AND ALBUTEROL SULFATE 3 ML: .5; 3 SOLUTION RESPIRATORY (INHALATION) at 06:40

## 2019-09-02 RX ADMIN — MYCOPHENOLATE MOFETIL 1000 MG: 250 CAPSULE ORAL at 05:14

## 2019-09-02 RX ADMIN — FOLIC ACID 1 MG: 1 TABLET ORAL at 05:12

## 2019-09-02 RX ADMIN — ONDANSETRON 4 MG: 2 INJECTION INTRAMUSCULAR; INTRAVENOUS at 01:43

## 2019-09-02 RX ADMIN — POLYETHYLENE GLYCOL 3350 1 PACKET: 17 POWDER, FOR SOLUTION ORAL at 05:12

## 2019-09-02 RX ADMIN — IVABRADINE 7.5 MG: 5 TABLET, FILM COATED ORAL at 05:13

## 2019-09-02 RX ADMIN — PROCHLORPERAZINE EDISYLATE 10 MG: 5 INJECTION INTRAMUSCULAR; INTRAVENOUS at 07:25

## 2019-09-02 RX ADMIN — FUROSEMIDE 80 MG: 40 TABLET ORAL at 05:11

## 2019-09-02 RX ADMIN — FLUOXETINE HYDROCHLORIDE 20 MG: 20 CAPSULE ORAL at 05:10

## 2019-09-02 RX ADMIN — TRAMADOL HYDROCHLORIDE 50 MG: 50 TABLET, FILM COATED ORAL at 07:25

## 2019-09-02 RX ADMIN — Medication 1 CAPSULE: at 07:25

## 2019-09-02 RX ADMIN — FAMOTIDINE 20 MG: 20 TABLET ORAL at 05:12

## 2019-09-02 RX ADMIN — SODIUM BICARBONATE 650 MG: 650 TABLET ORAL at 05:11

## 2019-09-02 RX ADMIN — BUDESONIDE AND FORMOTEROL FUMARATE DIHYDRATE 2 PUFF: 160; 4.5 AEROSOL RESPIRATORY (INHALATION) at 06:42

## 2019-09-02 RX ADMIN — SENNOSIDES, DOCUSATE SODIUM 2 TABLET: 50; 8.6 TABLET, FILM COATED ORAL at 05:11

## 2019-09-02 RX ADMIN — LEVOTHYROXINE SODIUM 75 MCG: 75 TABLET ORAL at 05:12

## 2019-09-02 RX ADMIN — BUSPIRONE HYDROCHLORIDE 5 MG: 10 TABLET ORAL at 05:11

## 2019-09-02 RX ADMIN — ENOXAPARIN SODIUM 40 MG: 100 INJECTION SUBCUTANEOUS at 05:12

## 2019-09-02 RX ADMIN — TRAMADOL HYDROCHLORIDE 50 MG: 50 TABLET, FILM COATED ORAL at 01:10

## 2019-09-02 RX ADMIN — ASPIRIN 81 MG: 81 TABLET, COATED ORAL at 05:19

## 2019-09-02 RX ADMIN — ACETAMINOPHEN 650 MG: 325 TABLET, FILM COATED ORAL at 05:10

## 2019-09-02 RX ADMIN — CEFTRIAXONE SODIUM 2 G: 2 INJECTION, POWDER, FOR SOLUTION INTRAMUSCULAR; INTRAVENOUS at 05:13

## 2019-09-02 NOTE — PROGRESS NOTES
Patient ready for bed, placed on CPAP, RT notified to review set up.    01:10 patient placed back on nasal cannula per her request.

## 2019-09-02 NOTE — PROGRESS NOTES
Received Bedside report. Assumed care at 0700. This pt is AOx4, ambulatory with SBA, voiding appropriately, no s/s of pain at this moment. Patient and RN discussed plan of care: questions answered. Labs noted, assessment complete, patient tolerating regular diet. Tele box in place. Pt is on 3Lof O2 via NC. Call light in place, fall precautions in place, patient educated on importance of calling for assistance. No additional needs at this time. VSS

## 2019-09-02 NOTE — DISCHARGE SUMMARY
"Discharge Summary    CHIEF COMPLAINT ON ADMISSION  Chief Complaint   Patient presents with   • Asthma       Reason for Admission  EMS     Admission Date  8/29/2019    CODE STATUS  Full Code    HPI & HOSPITAL COURSE  This is a 28 y.o. female here with above medical issues. She presented to the hospital again with acute on chronic asthma exacerbation and was placed on high dose IV steroids. She also had a mild UTI and was placed on macrobid for that. Initially she did ok but then her respiratory status worsened and she was transferred to the telemetry floor. Pulmonary was consulted. She did not require bipap but her asthma regimen was intensified. Singulair and spiriva were both added with beneficial effect. Her blood cultures remain no growth to date. Her oxygen status returned to baseline. She already has a home nebulizer. Patient will be placed on steroid taper and return to her chronic baseline of 15 mg daily.        Therefore, she is discharged in fair and stable condition to home with close outpatient follow-up.    The patient met 2-midnight criteria for an inpatient stay at the time of discharge.    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      \"per patients grandmother\"    Anxiety POA: Yes    Morbid obesity with BMI of 45.0-49.9, adult (HCC) POA: Yes  Resolved Problems:    Moderate persistent asthma with acute exacerbation POA: Yes    Lactic acidosis POA: Yes    UTI (urinary tract infection) POA: Yes    Optic " neuritis POA: Yes    Metabolic acidosis POA: Yes      FOLLOW UP  Future Appointments   Date Time Provider Department Center   9/5/2019  7:45 AM PFT-RM1 [facility] None   9/5/2019  8:45 AM SPIROMETRY/6MW [facility] None   9/5/2019  9:15 AM Ignacia Herrera M.D. [facility] None   9/6/2019  9:00 AM Torres Brody M.D. [address]   10/2/2019  8:40 AM John Leon M.D. [facility] None   10/2/2019  9:00 AM Torres Brody M.D. [address]   10/31/2019  8:20 AM Shahriar Moncada M.D. [facility] None   11/1/2019  3:00 PM Crissy Serrato M.D. [facility] None     No follow-up provider specified.    MEDICATIONS ON DISCHARGE     Medication List      START taking these medications      Instructions   montelukast 10 MG Tabs  Commonly known as:  SINGULAIR   Take 1 Tab by mouth every day.  Dose:  10 mg     tiotropium 18 MCG Caps  Commonly known as:  SPIRIVA   Inhale 1 Cap by mouth every day.  Dose:  18 mcg        CHANGE how you take these medications      Instructions   predniSONE 20 MG Tabs  What changed:    · medication strength  · how much to take  · how to take this  · when to take this  · additional instructions  Commonly known as:  DELTASONE   Take 30 mg qday X5days, then 20 mg qday X5days, then baseline 15 mg qday indefinitely.        CONTINUE taking these medications      Instructions   * albuterol 108 (90 Base) MCG/ACT Aers inhalation aerosol   Inhale 2 Puffs by mouth every 6 hours as needed for Shortness of Breath.  Dose:  2 Puff     * albuterol 2.5mg/3ml Nebu solution for nebulization  Commonly known as:  PROVENTIL   3 mL by Nebulization route every four hours as needed for Shortness of Breath.  Dose:  2.5 mg     aspirin EC 81 MG Tbec  Commonly known as:  ECOTRIN   Take 1 Tab by mouth every day.  Dose:  81 mg     budesonide-formoterol 160-4.5 MCG/ACT Aero  Commonly known as:  SYMBICORT   Inhale 2 Puffs by mouth 2 Times a Day.  Dose:  2 Puff     busPIRone 5 MG tablet  Commonly known as:  BUSPAR   TAKE ONE TABLET BY MOUTH TWICE DAILY      CELLCEPT 500 MG tablet  Generic drug:  mycophenolate   Take 1,000 mg by mouth 2 times a day.  Dose:  1,000 mg     CORLANOR 7.5 MG Tabs  Generic drug:  Ivabradine HCl   Take 7.5 mg by mouth 2 Times a Day.  Dose:  7.5 mg     Diclofenac Sodium 1 % Gel   Apply 1 Application to skin as directed 4 times a day as needed. Apply's on wrist, back, ankles, and feet.  Dose:  1 Application     FLUoxetine 20 MG Caps  Commonly known as:  PROZAC   Take 1 Cap by mouth every day.  Dose:  20 mg     Folic Acid 0.8 MG Caps   Take 0.8 mg by mouth every day.  Dose:  0.8 mg     furosemide 80 MG Tabs  Commonly known as:  LASIX   Take 80 mg by mouth every day.  Dose:  80 mg     gabapentin 300 MG Caps  Commonly known as:  NEURONTIN   Take 300 mg by mouth 4 times a day.  Dose:  300 mg     ipratropium-albuterol 0.5-2.5 (3) MG/3ML nebulizer solution  Commonly known as:  DUONEB   3 mL by Nebulization route every 6 hours as needed for Shortness of Breath.  Dose:  3 mL     levothyroxine 75 MCG Tabs  Commonly known as:  SYNTHROID   Take 1 Tab by mouth Every morning on an empty stomach.  Dose:  75 mcg     LYRICA 300 MG capsule  Generic drug:  pregabalin   Take 300 mg by mouth 2 times a day.  Dose:  300 mg     Melatonin 5 MG Tabs   Take 10 mg by mouth every day.  Dose:  10 mg     methocarbamol 750 MG Tabs  Commonly known as:  ROBAXIN   Take 750 mg by mouth 3 times a day.  Dose:  750 mg     NEXPLANON 68 MG Impl implant  Generic drug:  etonogestrel   Inject 1 Each as instructed Once.  Dose:  1 Each     ondansetron 4 MG Tabs tablet  Commonly known as:  ZOFRAN   Take 1 Tab by mouth every four hours as needed for Nausea/Vomiting.  Dose:  4 mg     ranitidine 300 MG tablet  Commonly known as:  ZANTAC   Take 1 Tab by mouth every day.  Dose:  300 mg     sodium bicarbonate 325 MG Tabs   Take 325-650 mg by mouth 3 times a day. 650 mg in AM  325 mg mid-afternoon  650 mg at night  Dose:  325-650 mg     traZODone 100 MG Tabs  Commonly known as:  DESYREL   TAKE   "ONE TABLET BY MOUTH NIGHTLY AT BEDTIME     TRULICITY 0.75 MG/0.5ML Sopn  Generic drug:  Dulaglutide   Inject 0.75 mg as instructed every Friday.  Dose:  0.75 mg     TYLENOL PM EXTRA STRENGTH PO   Take 2 Caps by mouth every evening.  Dose:  2 Cap     ziprasidone 80 MG Caps  Commonly known as:  GEODON   Take 80 mg by mouth 2 Times a Day.  Dose:  80 mg         * This list has 2 medication(s) that are the same as other medications prescribed for you. Read the directions carefully, and ask your doctor or other care provider to review them with you.                Allergies  Allergies   Allergen Reactions   • Cefdinir Shortness of Breath and Itching     Tolerated 1/18/17  Tolerates ceftriaxone    • Depakote [Divalproex Sodium] Unspecified     Muscle spasms/muscle pain and weakness     • Doxycycline Anaphylaxis and Vomiting     RXN=unknown   • Amitriptyline Unspecified     Headaches     • Aripiprazole [Abilify] Unspecified     Headaches/muscle twitching     • Ciprofloxacin Rash     Pt states \"I get a rash\".     • Clindamycin Nausea     Even with food     • Ees [Erythromycin] Vomiting and Nausea   • Flagyl [Metronidazole Hcl] Unspecified     \"eye problems\"     • Flomax [Tamsulosin Hydrochloride] Swelling   • Metformin Unspecified     Increased lactic acid      • Tape Rash     Tears skin off  coban with Tegaderm tape ok intermittently  RXN=ongoing   • Vancomycin Itching     Pt becomes flushed in face and chest.   RXN=7/10/16   • Wound Dressing Adhesive Hives     By pt report   • Cephalexin [Keflex] Rash     Pt states she gets a rash with this medication  Tolerates ceftriaxone   • Divalproex Sodium [Valproic Acid] Rash     .   • Levofloxacin Unspecified     Leg muscle cramps   • Metronidazole Rash     .   • Tamsulosin Hcl        DIET  Orders Placed This Encounter   Procedures   • Diet Order Regular     Standing Status:   Standing     Number of Occurrences:   1     Order Specific Question:   Diet:     Answer:   Regular [1] "       ACTIVITY  As tolerated.  Weight bearing as tolerated    CONSULTATIONS  Pulmonary    PROCEDURES  DX-CHEST-PORTABLE (1 VIEW)   Final Result      No acute cardiac or pulmonary abnormalities are identified.      DX-CHEST-PORTABLE (1 VIEW)   Final Result      No acute cardiopulmonary abnormality.            LABORATORY  Lab Results   Component Value Date    SODIUM 137 08/31/2019    POTASSIUM 3.9 08/31/2019    CHLORIDE 103 08/31/2019    CO2 20 08/31/2019    GLUCOSE 238 (H) 08/31/2019    BUN 19 08/31/2019    CREATININE 0.88 08/31/2019    CREATININE 0.75 (L) 07/20/2010    GLOMRATE >59 07/20/2010        Lab Results   Component Value Date    WBC 11.7 (H) 08/31/2019    WBC 6.1 07/20/2010    HEMOGLOBIN 12.6 08/31/2019    HEMATOCRIT 38.7 08/31/2019    PLATELETCT 139 (L) 08/31/2019        Total time of the discharge process exceeds 45 minutes.

## 2019-09-02 NOTE — PROGRESS NOTES
Report obtained from SONYA Booth. Patient resting in bed, call light in reach. Bed in low and locked position. Patient is on telemetry and O2. POC and whiteboard updated. No needs identified at this time.

## 2019-09-02 NOTE — DISCHARGE INSTRUCTIONS
Discharge Instructions per Estevan Hogan M.D.    Discharge patient Home  Diet diabetic with 9-13 servings of fruits and vegetables  Activities as tolerated  Follow ups with PCP in 7-10 days, call for appointment  Meds per med rec sheet  No smoking, no alcohol, no caffeine  Wear seat belt in motorized vehicle  Take medications as perscribed  Keep appointments  If symptoms worsen call PCP, 911 or urgent care.      DIET: as noted above    ACTIVITY: as tolerated    DIAGNOSIS: asthma excaerbation    Return to ER if worsening shortness of breath    Discharge Instructions    Discharged to home by car with relative. Discharged via wheelchair, hospital escort: Refused.  Special equipment needed: Oxygen    Be sure to schedule a follow-up appointment with your primary care doctor or any specialists as instructed.     Discharge Plan:   Diet Plan: Discussed  Activity Level: Discussed  Confirmed Follow up Appointment: Patient to Call and Schedule Appointment  Confirmed Symptoms Management: Discussed  Medication Reconciliation Updated: Yes  Influenza Vaccine Indication: Not indicated: Previously immunized this influenza season and > 8 years of age    I understand that a diet low in cholesterol, fat, and sodium is recommended for good health. Unless I have been given specific instructions below for another diet, I accept this instruction as my diet prescription.   Other diet: none    Special Instructions: None    · Is patient discharged on Warfarin / Coumadin?   No     Depression / Suicide Risk    As you are discharged from this [facility] facility, it is important to learn how to keep safe from harming yourself.    Recognize the warning signs:  · Abrupt changes in personality, positive or negative- including increase in energy   · Giving away possessions  · Change in eating patterns- significant weight changes-  positive or negative  · Change in sleeping patterns- unable to sleep or sleeping all the time   · Unwillingness  or inability to communicate  · Depression  · Unusual sadness, discouragement and loneliness  · Talk of wanting to die  · Neglect of personal appearance   · Rebelliousness- reckless behavior  · Withdrawal from people/activities they love  · Confusion- inability to concentrate     If you or a loved one observes any of these behaviors or has concerns about self-harm, here's what you can do:  · Talk about it- your feelings and reasons for harming yourself  · Remove any means that you might use to hurt yourself (examples: pills, rope, extension cords, firearm)  · Get professional help from the community (Mental Health, Substance Abuse, psychological counseling)  · Do not be alone:Call your Safe Contact- someone whom you trust who will be there for you.  · Call your local CRISIS HOTLINE 666-1883 or 521-703-8336  · Call your local Children's Mobile Crisis Response Team [place] (152) 805-3449 or www.SoupQubes  · Call the toll free National Suicide Prevention Hotlines   · National Suicide Prevention Lifeline 426-421-VIUA (7268)  · National Hope Line Network 800-SUICIDE (764-9403)      Asthma, Adult  Asthma is a recurring condition in which the airways tighten and narrow. Asthma can make it difficult to breathe. It can cause coughing, wheezing, and shortness of breath. Asthma episodes, also called asthma attacks, range from minor to life-threatening. Asthma cannot be cured, but medicines and lifestyle changes can help control it.  What are the causes?  Asthma is believed to be caused by inherited (genetic) and environmental factors, but its exact cause is unknown. Asthma may be triggered by allergens, lung infections, or irritants in the air. Asthma triggers are different for each person. Common triggers include:  · Animal dander.  · Dust mites.  · Cockroaches.  · Pollen from trees or grass.  · Mold.  · Smoke.  · Air pollutants such as dust, household , hair sprays, aerosol sprays, paint fumes, strong  chemicals, or strong odors.  · Cold air, weather changes, and winds (which increase molds and pollens in the air).  · Strong emotional expressions such as crying or laughing hard.  · Stress.  · Certain medicines (such as aspirin) or types of drugs (such as beta-blockers).  · Sulfites in foods and drinks. Foods and drinks that may contain sulfites include dried fruit, potato chips, and sparkling grape juice.  · Infections or inflammatory conditions such as the flu, a cold, or an inflammation of the nasal membranes (rhinitis).  · Gastroesophageal reflux disease (GERD).  · Exercise or strenuous activity.  What are the signs or symptoms?  Symptoms may occur immediately after asthma is triggered or many hours later. Symptoms include:  · Wheezing.  · Excessive nighttime or early morning coughing.  · Frequent or severe coughing with a common cold.  · Chest tightness.  · Shortness of breath.  How is this diagnosed?  The diagnosis of asthma is made by a review of your medical history and a physical exam. Tests may also be performed. These may include:  · Lung function studies. These tests show how much air you breathe in and out.  · Allergy tests.  · Imaging tests such as X-rays.  How is this treated?  Asthma cannot be cured, but it can usually be controlled. Treatment involves identifying and avoiding your asthma triggers. It also involves medicines. There are 2 classes of medicine used for asthma treatment:  · Controller medicines. These prevent asthma symptoms from occurring. They are usually taken every day.  · Reliever or rescue medicines. These quickly relieve asthma symptoms. They are used as needed and provide short-term relief.  Your health care provider will help you create an asthma action plan. An asthma action plan is a written plan for managing and treating your asthma attacks. It includes a list of your asthma triggers and how they may be avoided. It also includes information on when medicines should be taken  and when their dosage should be changed. An action plan may also involve the use of a device called a peak flow meter. A peak flow meter measures how well the lungs are working. It helps you monitor your condition.  Follow these instructions at home:  · Take medicines only as directed by your health care provider. Speak with your health care provider if you have questions about how or when to take the medicines.  · Use a peak flow meter as directed by your health care provider. Record and keep track of readings.  · Understand and use the action plan to help minimize or stop an asthma attack without needing to seek medical care.  · Control your home environment in the following ways to help prevent asthma attacks:  ¨ Do not smoke. Avoid being exposed to secondhand smoke.  ¨ Change your heating and air conditioning filter regularly.  ¨ Limit your use of fireplaces and wood stoves.  ¨ Get rid of pests (such as roaches and mice) and their droppings.  ¨ Throw away plants if you see mold on them.  ¨ Clean your floors and dust regularly. Use unscented cleaning products.  ¨ Try to have someone else vacuum for you regularly. Stay out of rooms while they are being vacuumed and for a short while afterward. If you vacuum, use a dust mask from a hardware store, a double-layered or microfilter vacuum  bag, or a vacuum  with a HEPA filter.  ¨ Replace carpet with wood, tile, or vinyl jodi. Carpet can trap dander and dust.  ¨ Use allergy-proof pillows, mattress covers, and box spring covers.  ¨ Wash bed sheets and blankets every week in hot water and dry them in a dryer.  ¨ Use blankets that are made of polyester or cotton.  ¨ Clean bathrooms and sandi with bleach. If possible, have someone repaint the walls in these rooms with mold-resistant paint. Keep out of the rooms that are being cleaned and painted.  ¨ Wash hands frequently.  Contact a health care provider if:  · You have wheezing, shortness of breath, or  a cough even if taking medicine to prevent attacks.  · The colored mucus you cough up (sputum) is thicker than usual.  · Your sputum changes from clear or white to yellow, green, gray, or bloody.  · You have any problems that may be related to the medicines you are taking (such as a rash, itching, swelling, or trouble breathing).  · You are using a reliever medicine more than 2-3 times per week.  · Your peak flow is still at 50-79% of your personal best after following your action plan for 1 hour.  · You have a fever.  Get help right away if:  · You seem to be getting worse and are unresponsive to treatment during an asthma attack.  · You are short of breath even at rest.  · You get short of breath when doing very little physical activity.  · You have difficulty eating, drinking, or talking due to asthma symptoms.  · You develop chest pain.  · You develop a fast heartbeat.  · You have a bluish color to your lips or fingernails.  · You are light-headed, dizzy, or faint.  · Your peak flow is less than 50% of your personal best.  This information is not intended to replace advice given to you by your health care provider. Make sure you discuss any questions you have with your health care provider.  Document Released: 12/18/2006 Document Revised: 05/31/2017 Document Reviewed: 07/17/2014  [employer] Interactive Patient Education © 2017 [employer]      Montelukast oral tablets  What is this medicine?  MONTELUKAST (mon te LOO kast) is used to prevent and treat the symptoms of asthma. It is also used to treat allergies. Do not use for an acute asthma attack.  This medicine may be used for other purposes; ask your health care provider or pharmacist if you have questions.  COMMON BRAND NAME(S): Singulair  What should I tell my health care provider before I take this medicine?  They need to know if you have any of these conditions:  -liver disease  -an unusual or allergic reaction to montelukast, other medicines, foods, dyes,  or preservatives  -pregnant or trying to get pregnant  -breast-feeding  How should I use this medicine?  This medicine should be given by mouth. Follow the directions on the prescription label. Take this medicine at the same time every day. You may take this medicine with or without meals. Do not chew the tablets. Do not stop taking your medicine unless your doctor tells you to.  Talk to your pediatrician regarding the use of this medicine in children. Special care may be needed. While this drug may be prescribed for children as young as 15 years of age for selected conditions, precautions do apply.  Overdosage: If you think you have taken too much of this medicine contact a poison control center or emergency room at once.  NOTE: This medicine is only for you. Do not share this medicine with others.  What if I miss a dose?  If you miss a dose, take it as soon as you can. If it is almost time for your next dose, take only that dose. Do not take double or extra doses.  What may interact with this medicine?  -anti-infectives like rifampin and rifabutin  -medicines for diabetes like rosiglitazone and repaglinide  -medicines for seizures like phenytoin, phenobarbital, and carbamazepine  -paclitaxel  This list may not describe all possible interactions. Give your health care provider a list of all the medicines, herbs, non-prescription drugs, or dietary supplements you use. Also tell them if you smoke, drink alcohol, or use illegal drugs. Some items may interact with your medicine.  What should I watch for while using this medicine?  Visit your doctor or health care professional for regular checks on your progress. Tell your doctor or health care professional if your allergy or asthma symptoms do not improve. Take your medicine even when you do not have symptoms. Do not stop taking any of your medicine(s) unless your doctor tells you to.  If you have asthma, talk to your doctor about what to do in an acute asthma attack.  Always have your inhaled rescue medicine for asthma attacks with you.  Patients and their families should watch for new or worsening thoughts of suicide or depression. Also watch for sudden changes in feelings such as feeling anxious, agitated, panicky, irritable, hostile, aggressive, impulsive, severely restless, overly excited and hyperactive, or not being able to sleep. Any worsening of mood or thoughts of suicide or dying should be reported to your health care professional right away.  What side effects may I notice from receiving this medicine?  Side effects that you should report to your doctor or health care professional as soon as possible:  -allergic reactions like skin rash or hives, or swelling of the face, lips, or tongue  -breathing problems  -confusion  -dark urine  -fever or infection  -flu-like symptoms  -hallucinations  -painful lumps under the skin  -pain, tingling, numbness in the hands or feet  -sinus pain or swelling  -suicidal thoughts or other mood changes  -tremors  -trouble sleeping  -uncontrolled muscle movements  -unusual bleeding or bruising  -yellowing of the eyes or skin  Side effects that usually do not require medical attention (report to your doctor or health care professional if they continue or are bothersome):  -cough  -dizziness  -drowsiness  -headache  -nightmares  -stomach upset  -stuffy nose  This list may not describe all possible side effects. Call your doctor for medical advice about side effects. You may report side effects to FDA at 3-363-FDA-7200.  Where should I keep my medicine?  Keep out of the reach of children.  Store at room temperature between 15 and 30 degrees C (59 and 86 degrees F). Protect from light and moisture. Keep this medicine in the original bottle. Throw away any unused medicine after the expiration date.  NOTE: This sheet is a summary. It may not cover all possible information. If you have questions about this medicine, talk to your doctor,  pharmacist, or health care provider.  © 2018 Elsevier/Gold Standard (2016-12-19 09:40:44)      Tiotropium inhalation powder  What is this medicine?  TIOTROPIUM (isidro oh TRO pee um) is a bronchodilator. It helps open up the airways in your lungs to make it easier to breathe. This medicine is used to treat chronic obstructive pulmonary disease (COPD), including emphysema and chronic bronchitis. Do not use this medicine for an acute attack.  This medicine may be used for other purposes; ask your health care provider or pharmacist if you have questions.  COMMON BRAND NAME(S): Spiriva HandiHaler  What should I tell my health care provider before I take this medicine?  They need to know if you have any of these conditions:  -bladder problems or difficulty passing urine  -glaucoma  -kidney disease  -prostate trouble  -an unusual or allergic reaction to tiotropium, ipratropium, atropine, other medicines, lactose, foods, dyes, or preservatives  -pregnant or trying to get pregnant  -breast-feeding  How should I use this medicine?  This medicine is used in a special inhaler. Do NOT swallow the capsules. Do NOT use a spacer device. Follow the directions on the prescription label. Take your medicine at regular intervals. Do not take it more often than directed. Do not stop taking except on your doctor's advice. Make sure that you are using your inhaler correctly. Ask your doctor or health care provider if you have any questions. Small pieces of the capsule may get in your mouth or throat when you breathe in your medicine. This is normal and should not hurt you.  Talk to your pediatrician regarding the use of this medicine in children. Special care may be needed.  Overdosage: If you think you have taken too much of this medicine contact a poison control center or emergency room at once.  NOTE: This medicine is only for you. Do not share this medicine with others.  What if I miss a dose?  If you miss a dose, use it as soon as you  can. If it is almost time for your next dose, use only that dose and continue with your regular schedule. Do not use double or extra doses.  What may interact with this medicine?  This medicine may also interact with the following medications:  -atropine  -antihistamines for allergy, cough and cold  -certain medicines for bladder problems like oxybutynin, tolterodine  -certain medicines for stomach problems like dicyclomine, hyoscyamine  -certain medicines for travel sickness like scopolamine  -certain medicines for Parkinson's disease like benztropine, trihexyphenidyl  -ipratropium  This list may not describe all possible interactions. Give your health care provider a list of all the medicines, herbs, non-prescription drugs, or dietary supplements you use. Also tell them if you smoke, drink alcohol, or use illegal drugs. Some items may interact with your medicine.  What should I watch for while using this medicine?  Visit your doctor or health care professional for regular checks on your progress. Tell your doctor if your symptoms do not improve. Do not use more medicine than directed. If your symptoms get worse while you are using this medicine, call your doctor right away.  Do not get the this medicine in your eyes. It can cause irritation, pain, or blurred vision.  You may get dizzy. Do not drive, use machinery, or do anything that needs mental alertness until you know how this medicine affects you. Do not stand or sit up quickly, especially if you are an older patient. This reduces the risk of dizzy or fainting spells.  Clean the inhaler as directed in the patient information sheet that comes with this medicine.  What side effects may I notice from receiving this medicine?  Side effects that you should report to your doctor or health care professional as soon as possible:  -allergic reactions like skin rash or hives, swelling of the face, lips, or tongue  -breathing problems  -changes in vision  -chest  pain  -fast heartbeat  -infection or flu-like symptoms  -trouble passing urine or change in the amount of urine  Side effects that usually do not require medical attention (report to your doctor or health care professional if they continue or are bothersome):  -constipation  -cough  -dizziness  -dry mouth  -headache  -muscle pain  -sore throat  -stomach upset  This list may not describe all possible side effects. Call your doctor for medical advice about side effects. You may report side effects to FDA at 5-603-DAM-1246.  Where should I keep my medicine?  Keep out of the reach of children.  Store at room temperature between 15 and 30 degrees C (59 and 86 degrees F). Protect from humidity. Keep capsules in the foil pack until you are ready to use. Throw away any unused medicine after the expiration date.  NOTE: This sheet is a summary. It may not cover all possible information. If you have questions about this medicine, talk to your doctor, pharmacist, or health care provider.  © 2018 Elsevier/Gold Standard (2015-09-30 13:19:07)

## 2019-09-02 NOTE — CARE PLAN
Problem: Communication  Goal: The ability to communicate needs accurately and effectively will improve  9/2/2019 0708 by Emmy Handley R.N.  Outcome: PROGRESSING AS EXPECTED  9/1/2019 1713 by Emmy Handley R.N.  Outcome: PROGRESSING AS EXPECTED   Patient educated to utilize call light. Patient and family oriented to hospital room. Patient encouraged to ask questions about plan of care. Patient effectively uses call light and is involved in POC.       Problem: Knowledge Deficit  Goal: Knowledge of disease process/condition, treatment plan, diagnostic tests, and medications will improve  Outcome: PROGRESSING AS EXPECTED   Patient is educated of disease process and condition. Treatment team has included patient in plan of care. All medications indications and side effects are explained. Patient is encouraged to ask questions. Patient indicates understanding.

## 2019-09-02 NOTE — PROGRESS NOTES
Patient discharged to home via car with relative. Discharge paperwork was discussed with the patient. LDAs were removed. Tele monitor disconnected. Pt prescriptions sent to pharmacy. Patient escorted out in wheelchair.

## 2019-09-04 ENCOUNTER — PATIENT OUTREACH (OUTPATIENT)
Dept: HEALTH INFORMATION MANAGEMENT | Facility: OTHER | Age: 29
End: 2019-09-04

## 2019-09-05 ENCOUNTER — NON-PROVIDER VISIT (OUTPATIENT)
Dept: PULMONOLOGY | Facility: HOSPICE | Age: 29
End: 2019-09-05
Attending: INTERNAL MEDICINE
Payer: MEDICARE

## 2019-09-05 ENCOUNTER — OFFICE VISIT (OUTPATIENT)
Dept: PULMONOLOGY | Facility: HOSPICE | Age: 29
End: 2019-09-05
Payer: MEDICARE

## 2019-09-05 VITALS — BODY MASS INDEX: 46.93 KG/M2 | WEIGHT: 282 LBS

## 2019-09-05 VITALS
SYSTOLIC BLOOD PRESSURE: 112 MMHG | OXYGEN SATURATION: 96 % | RESPIRATION RATE: 20 BRPM | TEMPERATURE: 98.1 F | HEIGHT: 64 IN | BODY MASS INDEX: 47.46 KG/M2 | HEART RATE: 82 BPM | WEIGHT: 278 LBS | DIASTOLIC BLOOD PRESSURE: 64 MMHG

## 2019-09-05 VITALS — WEIGHT: 278 LBS | BODY MASS INDEX: 48.47 KG/M2

## 2019-09-05 DIAGNOSIS — H46.9 OPTIC NEURITIS: ICD-10-CM

## 2019-09-05 DIAGNOSIS — R79.89 LACTATE BLOOD INCREASED: ICD-10-CM

## 2019-09-05 DIAGNOSIS — E66.01 CLASS 3 SEVERE OBESITY WITH BODY MASS INDEX (BMI) OF 45.0 TO 49.9 IN ADULT, UNSPECIFIED OBESITY TYPE, UNSPECIFIED WHETHER SERIOUS COMORBIDITY PRESENT (HCC): ICD-10-CM

## 2019-09-05 DIAGNOSIS — R06.02 SOB (SHORTNESS OF BREATH): ICD-10-CM

## 2019-09-05 DIAGNOSIS — R09.02 EXERCISE HYPOXEMIA: ICD-10-CM

## 2019-09-05 DIAGNOSIS — J45.50 SEVERE PERSISTENT ASTHMA, UNSPECIFIED WHETHER COMPLICATED: ICD-10-CM

## 2019-09-05 PROCEDURE — 94726 PLETHYSMOGRAPHY LUNG VOLUMES: CPT | Performed by: INTERNAL MEDICINE

## 2019-09-05 PROCEDURE — 94618 PULMONARY STRESS TESTING: CPT | Performed by: INTERNAL MEDICINE

## 2019-09-05 PROCEDURE — 94729 DIFFUSING CAPACITY: CPT | Performed by: INTERNAL MEDICINE

## 2019-09-05 PROCEDURE — 94060 EVALUATION OF WHEEZING: CPT | Performed by: INTERNAL MEDICINE

## 2019-09-05 PROCEDURE — 99214 OFFICE O/P EST MOD 30 MIN: CPT | Mod: 25 | Performed by: INTERNAL MEDICINE

## 2019-09-05 ASSESSMENT — 6 MINUTE WALK TEST (6MWT)
TOTAL REST TIME: 0
PERCEIVED BREATHLESSNESS AT 1 MIN: 2
PERCEIVED FATIGUE AT 3 MIN: 2
O2 SAT PERCENT ROOM AIR: 95
HEART RATE: 89
HEART RATE AT 1 MIN: 100
SAO2 AT 4 MIN: 93
PERCEIVED FATIGUE AT 1 MIN: 3
SAO2 AT 2 MIN: 94
SITTING BLOOD PRESSURE: 130/90
PERCEIVED FATIGUE AT 4 MIN: 2
PERCEIVED FATIGUE AT 5 MIN: 3
AMBULATES WITH O2: WITHOUT O2
SAO2 AT 1 MIN: 94
PERCEIVED BREATHLESSNESS AT 5 MIN: 3
HEART RATE AT 4 MIN: 97
HEART RATE AT 2 MIN: 103
PERCEIVED FATIGUE AT 2 MIN: 3
SAO2 AT 1 MIN: 93
HEART RATE AT 6 MIN: 105
PERCEIVED BREATHLESSNESS AT 2 MIN: 2
SAO2 AT 2 MIN: 94
HEART RATE AT 3 MIN: 102
HEART RATE AT 2 MIN: 88
BLOOD PRESSURE AT 1 MIN: 115/75
HEART RATE AT 5 MIN: 102
PERCENT OF NORMAL WALKED: 44
BLOOD PRESSURE: RIGHT ARM
NUMBER OF RESTS: 0
SAO2 AT 3 MIN: 94
PERCEIVED BREATHLESSNESS AT 4 MIN: 3
HEART RATE AT 1 MIN: 87
PERCEIVED BREATHLESSNESS AT 1 MIN: 0.5
BLOOD PRESSURE AT 2 MIN: 112/80
SAO2 AT 6 MIN: 94
PERCEIVED FATIGUE AT 2 MIN: 1
PERCEIVED BREATHLESSNESS AT 3 MIN: 3
PERCEIVED FATIGUE AT 6 MIN: 3
PERCEIVED BREATHLESSNESS AT 2 MIN: 0
SAO2 AT 5 MIN: 95
PERCEIVED BREATHLESSNESS AT 6 MIN: 3
PERCEIVED FATIGUE AT 1 MIN: 1

## 2019-09-05 ASSESSMENT — PULMONARY FUNCTION TESTS
FEV1_PREDICTED: 3.14
FEV1/FVC_PREDICTED: 84
FEV1_PERCENT_CHANGE: 1
FVC: 2.87
FEV1: 2.49
FEV1/FVC: 88
FEV1_PERCENT_PREDICTED: 81
FEV1_LLN: 2.62
FEV1/FVC_PERCENT_CHANGE: 0
FEV1/FVC_PERCENT_PREDICTED: 105
FEV1/FVC: 88.85
FVC_PERCENT_PREDICTED: 76
FEV1/FVC: 88
FEV1/FVC_PERCENT_PREDICTED: 105
FEV1_PERCENT_CHANGE: 2
FVC_LLN: 3.11
FEV1/FVC_PERCENT_LLN: 70
FEV1_PERCENT_PREDICTED: 79
FEV1: 2.55
FEV1/FVC_PERCENT_CHANGE: 200
FEV1/FVC_PERCENT_PREDICTED: 84
FVC_PREDICTED: 3.72
FEV1/FVC_PERCENT_PREDICTED: 104
FVC: 2.83
FEV1/FVC_PERCENT_PREDICTED: 104
FVC_PERCENT_PREDICTED: 77
FEV1/FVC: 89

## 2019-09-05 ASSESSMENT — ENCOUNTER SYMPTOMS
DIARRHEA: 0
BACK PAIN: 0
DIZZINESS: 0
CHILLS: 0
CONSTIPATION: 0
SPUTUM PRODUCTION: 0
WEIGHT LOSS: 0
NAUSEA: 0
SHORTNESS OF BREATH: 1
EYE REDNESS: 0
NECK PAIN: 0
ABDOMINAL PAIN: 0
DOUBLE VISION: 0
ORTHOPNEA: 0
HEADACHES: 0
WHEEZING: 1
CLAUDICATION: 0
FEVER: 0
DEPRESSION: 0
COUGH: 0
STRIDOR: 0
TREMORS: 0
DIAPHORESIS: 0
WEAKNESS: 0
HEARTBURN: 0
SPEECH CHANGE: 0
VOMITING: 0
PHOTOPHOBIA: 0
PALPITATIONS: 0
EYE PAIN: 0
FALLS: 0
MYALGIAS: 0
PND: 0
HEMOPTYSIS: 0
EYE DISCHARGE: 0
SORE THROAT: 0
BLURRED VISION: 0
FOCAL WEAKNESS: 0
SINUS PAIN: 0

## 2019-09-05 NOTE — PROCEDURES
Technician: Faye Velásquez RRT   Good patient effort & cooperation.  The results of this test meet the ATS/ERS standards for acceptability & reproducibility.  Test was performed on the [employer] Body Plethysmograph-Elite DX system.  Predicted values were [facility]-3 for spirometry, [facility] for DLCO, ITS for Lung Volumes.  The DLCO was uncorrected for Hgb.  A bronchodilator of Ventolin HFA -2puffs via spacer administered.  DLCO performed during dilation period.    Interpretation;   1.  Baseline spirometry shows mild reduction in FEV1 at 79% of predicted and FVC at 76% of predicted with an FEV1/FVC ratio of 88.  2.  There is no significant bronchodilator response.  3.  Lung volumes are low normal with total lung capacity at 83% of predicted.  4.  DLCO is low normal at 85% of predicted.  Pulmonary function tests are consistent with mild restrictive process and may be secondary to obesity.  Suggest clinical correlation.

## 2019-09-05 NOTE — PROGRESS NOTES
Chief Complaint   Patient presents with   • Follow-Up     last seen 7/24/19 HOS DX ASTHMA 8/29-9/1   • Results     PFT 60 9/5/19, 6 MW          HPI: This patient is a 28 y.o. female whom is followed in our clinic for asthma last seen by Dr. Estrada on 7/24/19.  The patient has a complicated past medical history including optic neuritis for which she is on immunosuppressive therapy with CellCept and chronic prednisone.  She also suffers from hypothyroid, chronic kidney disorder, unspecified weakness with suspected metabolic disorder and asthma.  She was recently diagnosed with mild obstructive sleep apnea after referral to sleep medicine following her first visit with us in July.  AHI was roughly 13.  She is pending follow-up to start therapy for this.  With regards to her asthma, this is a childhood diagnosis which was relatively well controlled however over the past several months she has had multiple visits to the emergency department for worsening asthma symptoms.  Most recently she was admitted from August 29 to September 1 with presumed acute asthma exacerbation.  Her laboratories during that admission were notable for mild acute metabolic acidosis with a lactate level anywhere from 4-6.  Her ABG done on August 30 showed pH of 7.44, PCO2 of 19 and PaO2 of 160.  She is currently on 30 mg of prednisone with plans to taper to 15 before she follows up with MD regarding her optic neuritis for which she is maintained on chronic steroids.  Pulmonary function test obtained today show mild airflow restriction, no significant bronchodilator response, low normal lung volumes and low normal DLCO.  She did not desaturate on 6-minute walk test.  She tells me today that her symptoms are well controlled.  She does have a history of exertional hypoxemia for which she has supplemental oxygen at home and etiology has been unclear to this point.  Chest imaging including chest x-ray from August 30 has been unremarkable.  A CT  "angiogram obtained on July 11 of the chest showed no parenchymal lung disease.  Chest x-ray from August 30 showed hypoventilation.  Echocardiogram from July 11 showed normal systolic and diastolic function with RVSP of 30.  With regards to asthma regimen, she is currently on high-dose Symbicort, Singulair, Spiriva Respimat and as needed albuterol via MDI and nebulizer which she uses at least once per day.  Triggers for her asthma in the past have included cigarette smoke and changes in the weather.    Past Medical History:   Diagnosis Date   • Abdominal pain    • Anginal syndrome     random chest pain especially with tachycardia   • Apnea, sleep    • Arrhythmia     \"sinus tachycardia\", cariologist, Dr. Kumar; ablation 2/2016   • Arthritis     osteo   • ASTHMA     when around smoke   • Atrial fibrillation (HCC)    • Back pain    • Borderline personality disorder (HCC)    • Breath shortness     with tachycardia   • Bronchitis    • Cardiac arrhythmia    • Chickenpox    • Chronic UTI 9/18/2010   • Cough    • Daytime sleepiness    • Depression    • Diabetes (HCC)    • Diarrhea    • Disorder of thyroid    • Fall    • Fatigue    • Frequent headaches    • Gasping for breath    • Gynecological disorder     PCOS   • Headache(784.0)    • Heart burn    • History of falling    • Hypertension    • Indigestion    • Migraine    • 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HCC)    • 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HCC)    • Nausea    • Obesity    • Pain 08-15-12    back, 7/10   • Painful joint    • PCOS (polycystic ovarian syndrome)    • Pneumonia 2012   • Psychosis (HCC)    • Renal disorder     \"kidney disease, stage 1\" nephrologist, Dr. 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   • Urinary bladder disorder     Suprapubic cath   • " Urinary incontinence    • Weakness    • Wears glasses        Social History     Socioeconomic History   • Marital status: Single     Spouse name: Not on file   • Number of children: Not on file   • Years of education: Not on file   • Highest education level: Not on file   Occupational History   • Not on file   Social Needs   • Financial resource strain: Not on file   • Food insecurity:     Worry: Not on file     Inability: Not on file   • Transportation needs:     Medical: Not on file     Non-medical: Not on file   Tobacco Use   • Smoking status: Never Smoker   • Smokeless tobacco: Never Used   Substance and Sexual Activity   • Alcohol use: No     Alcohol/week: 0.0 oz   • Drug use: Yes     Frequency: 7.0 times per week     Types: Marijuana, Oral     Comment: Medicinal edible's   • Sexual activity: Not Currently     Birth control/protection: Pill   Lifestyle   • Physical activity:     Days per week: Not on file     Minutes per session: Not on file   • Stress: Not on file   Relationships   • Social connections:     Talks on phone: Not on file     Gets together: Not on file     Attends [employer] service: Not on file     Active member of club or organization: Not on file     Attends meetings of clubs or organizations: Not on file     Relationship status: Not on file   • Intimate partner violence:     Fear of current or ex partner: Not on file     Emotionally abused: Not on file     Physically abused: Not on file     Forced sexual activity: Not on file   Other Topics Concern   • Not on file   Social History Narrative    ** Merged History Encounter **            Family History   Problem Relation Age of Onset   • Hypertension Mother    • Sleep Apnea Mother    • Heart Disease Mother    • Other Mother         hypothryod   • Hypertension Maternal Uncle    • Heart Disease Maternal Grandmother    • Hypertension Maternal Grandmother    • No Known Problems Sister    • Other Sister         Narcolepsy;fibromyalsia;bone;nerve   •  Genitourinary () Problems Sister         endometriosis       Current Outpatient Medications on File Prior to Visit   Medication Sig Dispense Refill   • montelukast (SINGULAIR) 10 MG Tab Take 1 Tab by mouth every day. 30 Tab 1   • tiotropium (SPIRIVA) 18 MCG Cap Inhale 1 Cap by mouth every day. 30 Cap 3   • predniSONE (DELTASONE) 20 MG Tab Take 30 mg qday X5days, then 20 mg qday X5days, then baseline 15 mg qday indefinitely. 30 Tab 0   • Folic Acid 0.8 MG Cap Take 0.8 mg by mouth every day.     • Ivabradine HCl (CORLANOR) 7.5 MG Tab Take 7.5 mg by mouth 2 Times a Day.     • ipratropium-albuterol (DUONEB) 0.5-2.5 (3) MG/3ML nebulizer solution 3 mL by Nebulization route every 6 hours as needed for Shortness of Breath. 60 Bullet 1   • budesonide-formoterol (SYMBICORT) 160-4.5 MCG/ACT Aerosol Inhale 2 Puffs by mouth 2 Times a Day. 1 Inhaler 11   • albuterol (PROVENTIL) 2.5mg/3ml Nebu Soln solution for nebulization 3 mL by Nebulization route every four hours as needed for Shortness of Breath. 100 mL 3   • etonogestrel (NEXPLANON) 68 MG Implant implant Inject 1 Each as instructed Once.     • FLUoxetine (PROZAC) 20 MG Cap Take 1 Cap by mouth every day. 30 Cap 2   • mycophenolate (CELLCEPT) 500 MG tablet Take 1,000 mg by mouth 2 times a day.     • levothyroxine (SYNTHROID) 75 MCG Tab Take 1 Tab by mouth Every morning on an empty stomach. 90 Tab 1   • gabapentin (NEURONTIN) 300 MG Cap Take 300 mg by mouth 4 times a day.     • Diclofenac Sodium 1 % Gel Apply 1 Application to skin as directed 4 times a day as needed. Apply's on wrist, back, ankles, and feet.      • Dulaglutide (TRULICITY) 0.75 MG/0.5ML Solution Pen-injector Inject 0.75 mg as instructed every Friday.     • ranitidine (ZANTAC) 300 MG tablet Take 1 Tab by mouth every day. 60 Tab 3   • ondansetron (ZOFRAN) 4 MG Tab tablet Take 1 Tab by mouth every four hours as needed for Nausea/Vomiting. 20 Tab 3   • sodium bicarbonate 325 MG Tab Take 325-650 mg by mouth 3 times  a day. 650 mg in AM  325 mg mid-afternoon  650 mg at night     • albuterol 108 (90 Base) MCG/ACT Aero Soln inhalation aerosol Inhale 2 Puffs by mouth every 6 hours as needed for Shortness of Breath.     • Melatonin 5 MG Tab Take 10 mg by mouth every day.     • furosemide (LASIX) 80 MG Tab Take 80 mg by mouth every day.     • busPIRone (BUSPAR) 5 MG tablet TAKE ONE TABLET BY MOUTH TWICE DAILY 60 Tab 4   • ziprasidone (GEODON) 80 MG Cap Take 80 mg by mouth 2 Times a Day.     • methocarbamol (ROBAXIN) 750 MG Tab Take 750 mg by mouth 3 times a day.     • traZODone (DESYREL) 100 MG Tab TAKE  ONE TABLET BY MOUTH NIGHTLY AT BEDTIME 90 Tab 2   • Diphenhydramine-APAP, sleep, (TYLENOL PM EXTRA STRENGTH PO) Take 2 Caps by mouth every evening.     • LYRICA 300 MG capsule Take 300 mg by mouth 2 times a day.     • aspirin EC (ECOTRIN) 81 MG Tablet Delayed Response Take 1 Tab by mouth every day. 30 Tab 6     No current facility-administered medications on file prior to visit.        Cefdinir; Depakote [divalproex sodium]; Doxycycline; Amitriptyline; Aripiprazole [abilify]; Ciprofloxacin; Clindamycin; Ees [erythromycin]; Flagyl [metronidazole hcl]; Flomax [tamsulosin hydrochloride]; Metformin; Tape; Vancomycin; Wound dressing adhesive; Cephalexin [keflex]; Divalproex sodium [valproic acid]; Levofloxacin; Metronidazole; and Tamsulosin hcl      ROS:   Review of Systems   Constitutional: Positive for malaise/fatigue. Negative for chills, diaphoresis, fever and weight loss.   HENT: Negative for congestion, ear discharge, ear pain, hearing loss, nosebleeds, sinus pain, sore throat and tinnitus.    Eyes: Negative for blurred vision, double vision, photophobia, pain, discharge and redness.   Respiratory: Positive for shortness of breath and wheezing. Negative for cough, hemoptysis, sputum production and stridor.    Cardiovascular: Negative for chest pain, palpitations, orthopnea, claudication, leg swelling and PND.   Gastrointestinal:  "Negative for abdominal pain, constipation, diarrhea, heartburn, nausea and vomiting.   Genitourinary: Negative for dysuria and urgency.   Musculoskeletal: Negative for back pain, falls, joint pain, myalgias and neck pain.   Skin: Negative for itching and rash.   Neurological: Negative for dizziness, tremors, speech change, focal weakness, weakness and headaches.   Endo/Heme/Allergies: Negative for environmental allergies.   Psychiatric/Behavioral: Negative for depression.       /64 (BP Location: Left arm, Patient Position: Sitting, BP Cuff Size: Large adult)   Pulse 82   Temp 36.7 °C (98.1 °F) (Temporal)   Resp 20   Ht 1.613 m (5' 3.5\")   Wt (!) 126.1 kg (278 lb)   SpO2 96%   Physical Exam   Constitutional: She is oriented to person, place, and time. She appears well-developed and well-nourished.   Morbidly obese   HENT:   Head: Normocephalic and atraumatic.   Right Ear: External ear normal.   Left Ear: External ear normal.   Mouth/Throat: Oropharynx is clear and moist. No oropharyngeal exudate.   Eyes: Pupils are equal, round, and reactive to light. Conjunctivae and EOM are normal. No scleral icterus.   Neck: Normal range of motion. Neck supple. No JVD present. No tracheal deviation present.   Cardiovascular: Normal rate, regular rhythm and normal heart sounds. Exam reveals no gallop and no friction rub.   No murmur heard.  Pulmonary/Chest: Effort normal and breath sounds normal. No accessory muscle usage or stridor. No respiratory distress. She has no wheezes. She has no rales.   Abdominal: Soft. She exhibits no distension.   obese   Musculoskeletal: Normal range of motion. She exhibits no edema, tenderness or deformity.   Lymphadenopathy:     She has no cervical adenopathy.   Neurological: She is alert and oriented to person, place, and time. No cranial nerve deficit. Gait normal.   Skin: Skin is warm and dry. No rash noted. No cyanosis. Nails show no clubbing.   Psychiatric: She has a normal mood and " affect.       PFTs as reviewed by me personally: as per hPI    Imaging as reviewed by me personally:  As per HPI    Assessment:  1. Severe persistent asthma, unspecified whether complicated  IGE SERUM    VITAMIN D,25 HYDROXY   2. Class 3 severe obesity with body mass index (BMI) of 45.0 to 49.9 in adult, unspecified obesity type, unspecified whether serious comorbidity present (HCC)     3. Optic neuritis     4. Lactate blood increased         Plan:  1.  This is chronic and poorly controlled.  It does not appear that she has had elevated levels of eosinophils however I do not see an IgE level.  Additionally she has a history of low vitamin D which has been associated with poorly controlled asthma.  We will check a serum IgE and 25 hydroxy vitamin D and consideration for biologic therapy and supplementation respectively.  We will continue Symbicort, Spiriva, Singulair and as needed short acting bronchodilators.  I agree with Dr. Mondor that her hypoxia is not likely explained by her asthma but suspect she has element of obesity hypoventilation syndrome.  Certainly she does not display hypoxia on testing done today.  2.  This is chronic and as per above suspect contributes to some obesity hypoventilation syndrome.  Certainly the patient would benefit from both increased activity and weight loss if amenable.  Her history of potential metabolic disorder is not clear as she was denied specific genetic testing recommended at [facility] in 2016 unfortunately.  She may benefit from healthy lifestyle program enrollment.  3.  Patient is on chronic CellCept as well as prednisone for this.  It is interesting that she has can continue to have asthma flares despite chronic prednisone.  She will see her ophthalmologist regarding prednisone dosing when she reaches 15 mg on her current taper.  We will continue to try to manage her asthma with adjunctive agents as per above.  4.  Per notes from [facility] neurology the patient has had chronically  elevated lactic acid which was concerning for mitochondrial disorder.  She has never had formal genetic testing for this due to being denied given Medicaid status.  Her most recent elevations were in the acute setting of presumed asthma attack and mildly elevated.  Certainly you can see mild elevations of serum lactate in the setting of severe asthma attack.  I am not sure what to make of this at this time however we will attempt to control her asthma symptoms as per above.  She may benefit from referral to a tertiary center if we are unable to control her symptoms.  Return in about 4 weeks (around 10/3/2019) for asthma, labs.

## 2019-09-05 NOTE — PROCEDURES
Pt started and ended 6MWT on RA. Though pt was fatigued during the last half of the test, total distance walked at 800ft. w/ no stops.    Interpretation;  There is no significant desaturation with ambulation.  There is suggestion of decreased exercise tolerance given total distance walked is only 44% of predicted.

## 2019-09-06 ENCOUNTER — HOSPITAL ENCOUNTER (OUTPATIENT)
Dept: LAB | Facility: MEDICAL CENTER | Age: 29
End: 2019-09-06
Attending: INTERNAL MEDICINE
Payer: MEDICARE

## 2019-09-06 ENCOUNTER — OFFICE VISIT (OUTPATIENT)
Dept: MEDICAL GROUP | Facility: MEDICAL CENTER | Age: 29
End: 2019-09-06
Payer: MEDICARE

## 2019-09-06 VITALS
DIASTOLIC BLOOD PRESSURE: 68 MMHG | TEMPERATURE: 97.4 F | SYSTOLIC BLOOD PRESSURE: 126 MMHG | HEART RATE: 87 BPM | BODY MASS INDEX: 47.97 KG/M2 | WEIGHT: 281 LBS | HEIGHT: 64 IN | OXYGEN SATURATION: 96 %

## 2019-09-06 DIAGNOSIS — J45.51 SEVERE PERSISTENT ASTHMA WITH EXACERBATION: ICD-10-CM

## 2019-09-06 DIAGNOSIS — Z23 NEED FOR VACCINATION: ICD-10-CM

## 2019-09-06 DIAGNOSIS — Z99.81 CHRONIC RESPIRATORY FAILURE WITH HYPOXIA, ON HOME OXYGEN THERAPY (HCC): ICD-10-CM

## 2019-09-06 DIAGNOSIS — J45.50 SEVERE PERSISTENT ASTHMA, UNSPECIFIED WHETHER COMPLICATED: ICD-10-CM

## 2019-09-06 DIAGNOSIS — J96.11 CHRONIC RESPIRATORY FAILURE WITH HYPOXIA, ON HOME OXYGEN THERAPY (HCC): ICD-10-CM

## 2019-09-06 PROBLEM — G44.011 INTRACTABLE EPISODIC CLUSTER HEADACHE: Status: RESOLVED | Noted: 2017-09-14 | Resolved: 2019-09-06

## 2019-09-06 PROBLEM — R74.01 TRANSAMINITIS: Status: RESOLVED | Noted: 2018-08-08 | Resolved: 2019-09-06

## 2019-09-06 PROBLEM — H92.02 LEFT EAR PAIN: Status: RESOLVED | Noted: 2019-08-17 | Resolved: 2019-09-06

## 2019-09-06 LAB — 25(OH)D3 SERPL-MCNC: 13 NG/ML (ref 30–100)

## 2019-09-06 PROCEDURE — 82785 ASSAY OF IGE: CPT

## 2019-09-06 PROCEDURE — 90670 PCV13 VACCINE IM: CPT | Performed by: INTERNAL MEDICINE

## 2019-09-06 PROCEDURE — 82306 VITAMIN D 25 HYDROXY: CPT | Mod: GA

## 2019-09-06 PROCEDURE — 36415 COLL VENOUS BLD VENIPUNCTURE: CPT | Mod: GA

## 2019-09-06 PROCEDURE — 99214 OFFICE O/P EST MOD 30 MIN: CPT | Performed by: INTERNAL MEDICINE

## 2019-09-06 PROCEDURE — G0009 ADMIN PNEUMOCOCCAL VACCINE: HCPCS | Performed by: INTERNAL MEDICINE

## 2019-09-06 RX ORDER — TIOTROPIUM BROMIDE 18 UG/1
18 CAPSULE ORAL; RESPIRATORY (INHALATION) DAILY
Qty: 90 CAP | Refills: 3 | Status: SHIPPED
Start: 2019-09-06 | End: 2020-08-15

## 2019-09-06 RX ORDER — MONTELUKAST SODIUM 10 MG/1
10 TABLET ORAL DAILY
Qty: 90 TAB | Refills: 3 | Status: SHIPPED | OUTPATIENT
Start: 2019-09-06 | End: 2019-11-06

## 2019-09-06 NOTE — PROGRESS NOTES
Subjective:         POST DISCHARGE CALL:  Discharge Date:9/2/2019   Date of Outreach Call: 9/4/2019  1:20 PM  Now that you're home, how are you doing? Fair  Comment:Pt reports no current problems. States she is  feeling okay today.  Do you have questions about your medications? No    Did you fill your medications? Yes    Do you have a follow-up appointment scheduled?Yes  Comment:PCP 9/6/19    Discharging Department: Telemetry 7    Number of Attempts: 1  Current or previous attempts completed within two business days of discharge? Yes  Provided education regarding treatment plan, medication, self-management, ADLs? Yes  Has patient completed Advance Directive? If yes, advise them to bring to appointment. No  Care Manager phone number provided? Yes  Is there anything else I can help you with? No    Nurses outreach call note above reviewed.      Have reviewed discharge summary as well as all imaging and labs performed while in the hospital.    CC: Follow-up hospitalization follow-up hospitalization asthma.    HPI:   Kristin presents today with the following.    1. Chronic respiratory failure with hypoxia, on home oxygen therapy (HCC)/ Severe persistent asthma with exacerbation  Presents after repeated hospitalization for asthma.  She reports her breathing is now back to baseline she is using her albuterol once per day but nowhere near as severe.  She is on high-dose steroids already for her optic neuritis.  She was added Spiriva.  She seen pulmonology adding IgE levels.  She also had a UTI while in the hospital only symptoms did not grow out any bacteria she denies any further urinary complaints.    3. Need for vaccination  He has not had a Prevnar 13.      Patient Active Problem List    Diagnosis Date Noted   • Left leg weakness 07/14/2018     Priority: High   • Breast wound 11/06/2017     Priority: High   • Controlled type 2 diabetes mellitus without complication, without long-term current use of insulin ([facility])  04/26/2017     Priority: High   • Sinus tachycardia 10/31/2013     Priority: High   • Morbid obesity with BMI of 45.0-49.9, adult ([facility]) 10/24/2017     Priority: Low   • Depression 10/28/2016     Priority: Low   • Schizophrenia ([facility]) 10/27/2016     Priority: Low   • PCOS (polycystic ovarian syndrome) 11/23/2015     Priority: Low   • Progressive focal motor weakness 06/28/2015     Priority: Low   • Fatty liver disease, nonalcoholic 01/19/2015     Priority: Low   • Anxiety 12/16/2014     Priority: Low   • Knee pain, right 02/13/2014     Priority: Low   • Neurogenic bladder 04/02/2011     Priority: Low   • Borderline personality disorder in adult ([facility]) 09/18/2010     Priority: Low   • Uncontrolled type 2 diabetes mellitus with hyperglycemia ([facility]) 08/29/2019   • Moderate intermittent asthma without complication 06/20/2019   • Inappropriate sinus tachycardia 04/10/2019   • Palpitations 10/01/2018   • Chronic respiratory failure with hypoxia, on home oxygen therapy ([facility]) 08/08/2018   • TONYA (obstructive sleep apnea) 01/09/2018   • Functional diarrhea 01/05/2018   • Acquired hypothyroidism 08/04/2017   • Hashimoto's encephalopathy 05/17/2017   • Weakness of right upper extremity 02/23/2017   • Hypovitaminosis D 11/29/2016   • Chronic pain syndrome 10/27/2016   • Bowel and bladder incontinence 10/27/2016   • Galactorrhea 07/22/2016   • Weakness of both lower extremities 06/22/2016   • Morbidly obese ([facility]) 03/07/2016   • Scoliosis 03/07/2016   • GERD (gastroesophageal reflux disease) 03/07/2016   • Peripheral neuropathy (CMS-HCC) 03/06/2016   • H/O prior ablation treatment 02/10/2016       Current Outpatient Medications   Medication Sig Dispense Refill   • montelukast (SINGULAIR) 10 MG Tab Take 1 Tab by mouth every day. 90 Tab 3   • tiotropium (SPIRIVA) 18 MCG Cap Inhale 1 Cap by mouth every day. 90 Cap 3   • predniSONE (DELTASONE) 20 MG Tab Take 30 mg qday X5days, then 20 mg qday X5days, then baseline 15 mg qday indefinitely.  30 Tab 0   • Folic Acid 0.8 MG Cap Take 0.8 mg by mouth every day.     • Ivabradine HCl (CORLANOR) 7.5 MG Tab Take 7.5 mg by mouth 2 Times a Day.     • ipratropium-albuterol (DUONEB) 0.5-2.5 (3) MG/3ML nebulizer solution 3 mL by Nebulization route every 6 hours as needed for Shortness of Breath. 60 Bullet 1   • budesonide-formoterol (SYMBICORT) 160-4.5 MCG/ACT Aerosol Inhale 2 Puffs by mouth 2 Times a Day. 1 Inhaler 11   • albuterol (PROVENTIL) 2.5mg/3ml Nebu Soln solution for nebulization 3 mL by Nebulization route every four hours as needed for Shortness of Breath. 100 mL 3   • etonogestrel (NEXPLANON) 68 MG Implant implant Inject 1 Each as instructed Once.     • FLUoxetine (PROZAC) 20 MG Cap Take 1 Cap by mouth every day. 30 Cap 2   • mycophenolate (CELLCEPT) 500 MG tablet Take 1,000 mg by mouth 2 times a day.     • levothyroxine (SYNTHROID) 75 MCG Tab Take 1 Tab by mouth Every morning on an empty stomach. 90 Tab 1   • gabapentin (NEURONTIN) 300 MG Cap Take 300 mg by mouth 4 times a day.     • Diclofenac Sodium 1 % Gel Apply 1 Application to skin as directed 4 times a day as needed. Apply's on wrist, back, ankles, and feet.      • Dulaglutide (TRULICITY) 0.75 MG/0.5ML Solution Pen-injector Inject 0.75 mg as instructed every Friday.     • ranitidine (ZANTAC) 300 MG tablet Take 1 Tab by mouth every day. 60 Tab 3   • ondansetron (ZOFRAN) 4 MG Tab tablet Take 1 Tab by mouth every four hours as needed for Nausea/Vomiting. 20 Tab 3   • sodium bicarbonate 325 MG Tab Take 325-650 mg by mouth 3 times a day. 650 mg in AM  325 mg mid-afternoon  650 mg at night     • albuterol 108 (90 Base) MCG/ACT Aero Soln inhalation aerosol Inhale 2 Puffs by mouth every 6 hours as needed for Shortness of Breath.     • Melatonin 5 MG Tab Take 10 mg by mouth every day.     • furosemide (LASIX) 80 MG Tab Take 80 mg by mouth every day.     • busPIRone (BUSPAR) 5 MG tablet TAKE ONE TABLET BY MOUTH TWICE DAILY 60 Tab 4   • ziprasidone (GEODON)  "80 MG Cap Take 80 mg by mouth 2 Times a Day.     • methocarbamol (ROBAXIN) 750 MG Tab Take 750 mg by mouth 3 times a day.     • traZODone (DESYREL) 100 MG Tab TAKE  ONE TABLET BY MOUTH NIGHTLY AT BEDTIME 90 Tab 2   • Diphenhydramine-APAP, sleep, (TYLENOL PM EXTRA STRENGTH PO) Take 2 Caps by mouth every evening.     • LYRICA 300 MG capsule Take 300 mg by mouth 2 times a day.     • aspirin EC (ECOTRIN) 81 MG Tablet Delayed Response Take 1 Tab by mouth every day. 30 Tab 6     No current facility-administered medications for this visit.          Allergies as of 09/06/2019 - Reviewed 09/06/2019   Allergen Reaction Noted   • Cefdinir Shortness of Breath and Itching 03/01/2016   • Depakote [divalproex sodium] Unspecified 06/14/2010   • Doxycycline Anaphylaxis and Vomiting 08/15/2012   • Amitriptyline Unspecified 10/31/2013   • Aripiprazole [abilify] Unspecified 01/17/2013   • Ciprofloxacin Rash 12/17/2009   • Clindamycin Nausea 02/02/2011   • Ees [erythromycin] Vomiting and Nausea 08/28/2010   • Flagyl [metronidazole hcl] Unspecified 03/31/2011   • Flomax [tamsulosin hydrochloride] Swelling 09/24/2009   • Metformin Unspecified 07/23/2013   • Tape Rash 08/15/2012   • Vancomycin Itching 07/10/2016   • Wound dressing adhesive Hives 01/12/2018   • Cephalexin [keflex] Rash 01/01/2017   • Divalproex sodium [valproic acid] Rash 03/30/2017   • Levofloxacin Unspecified 10/27/2016   • Metronidazole Rash 03/30/2017   • Tamsulosin hcl  09/19/2010        ROS: Denies Chest pain, SOB, LE edema.    /68 (BP Location: Right arm, Patient Position: Sitting) Comment (BP Location): right forearm  Pulse 87   Temp 36.3 °C (97.4 °F)   Ht 1.613 m (5' 3.5\")   Wt (!) 127.5 kg (281 lb)   SpO2 96%   BMI 49.00 kg/m²     Physical Exam:  Gen:         Alert and oriented, No apparent distress.  Neck:        No Lymphadenopathy or Bruits.  Lungs:     Clear to auscultation bilaterally  CV:          Regular rate and rhythm. No murmurs, rubs or " espinoza.               Ext:          No clubbing, cyanosis, edema.      Assessment and Plan.   28 y.o. female with the following issues.    1. Chronic respiratory failure with hypoxia, on home oxygen therapy (HCC)/Severe persistent asthma with exacerbation  Have given her medications to last her for 3 months continue to follow along with pulmonology.  Steroids as dictated for optic neuritis.  - montelukast (SINGULAIR) 10 MG Tab; Take 1 Tab by mouth every day.  Dispense: 90 Tab; Refill: 3  - tiotropium (SPIRIVA) 18 MCG Cap; Inhale 1 Cap by mouth every day.  Dispense: 90 Cap; Refill: 3         Need for vaccination    - Pneumococcal Conjugate Vaccine 13-Valent          - Hospitalization and results reviewed with patient.   - Medications reviewed including instructions regarding high risk medications, dosing and side effects.

## 2019-09-08 LAB — IGE SERPL-ACNC: 16 KU/L

## 2019-09-13 ENCOUNTER — TELEPHONE (OUTPATIENT)
Dept: PULMONOLOGY | Facility: HOSPICE | Age: 29
End: 2019-09-13

## 2019-09-13 ENCOUNTER — APPOINTMENT (OUTPATIENT)
Dept: RADIOLOGY | Facility: MEDICAL CENTER | Age: 29
End: 2019-09-13
Attending: EMERGENCY MEDICINE
Payer: MEDICARE

## 2019-09-13 ENCOUNTER — HOSPITAL ENCOUNTER (EMERGENCY)
Facility: MEDICAL CENTER | Age: 29
End: 2019-09-13
Attending: EMERGENCY MEDICINE
Payer: MEDICARE

## 2019-09-13 VITALS
BODY MASS INDEX: 46.65 KG/M2 | TEMPERATURE: 98.2 F | SYSTOLIC BLOOD PRESSURE: 132 MMHG | HEART RATE: 96 BPM | DIASTOLIC BLOOD PRESSURE: 52 MMHG | WEIGHT: 280 LBS | HEIGHT: 65 IN | RESPIRATION RATE: 28 BRPM | OXYGEN SATURATION: 95 %

## 2019-09-13 DIAGNOSIS — F41.9 ANXIETY: ICD-10-CM

## 2019-09-13 DIAGNOSIS — J45.901 MODERATE ASTHMA WITH ACUTE EXACERBATION, UNSPECIFIED WHETHER PERSISTENT: ICD-10-CM

## 2019-09-13 LAB
ALBUMIN SERPL BCP-MCNC: 4.3 G/DL (ref 3.2–4.9)
ALBUMIN/GLOB SERPL: 2.5 G/DL
ALP SERPL-CCNC: 34 U/L (ref 30–99)
ALT SERPL-CCNC: 58 U/L (ref 2–50)
ANION GAP SERPL CALC-SCNC: 15 MMOL/L (ref 0–11.9)
AST SERPL-CCNC: 14 U/L (ref 12–45)
BASOPHILS # BLD AUTO: 0.3 % (ref 0–1.8)
BASOPHILS # BLD: 0.02 K/UL (ref 0–0.12)
BILIRUB SERPL-MCNC: 0.4 MG/DL (ref 0.1–1.5)
BUN SERPL-MCNC: 18 MG/DL (ref 8–22)
CALCIUM SERPL-MCNC: 8.9 MG/DL (ref 8.5–10.5)
CHLORIDE SERPL-SCNC: 108 MMOL/L (ref 96–112)
CO2 SERPL-SCNC: 16 MMOL/L (ref 20–33)
CREAT SERPL-MCNC: 0.93 MG/DL (ref 0.5–1.4)
EOSINOPHIL # BLD AUTO: 0.01 K/UL (ref 0–0.51)
EOSINOPHIL NFR BLD: 0.1 % (ref 0–6.9)
ERYTHROCYTE [DISTWIDTH] IN BLOOD BY AUTOMATED COUNT: 47.6 FL (ref 35.9–50)
GLOBULIN SER CALC-MCNC: 1.7 G/DL (ref 1.9–3.5)
GLUCOSE SERPL-MCNC: 240 MG/DL (ref 65–99)
HCT VFR BLD AUTO: 39.4 % (ref 37–47)
HGB BLD-MCNC: 12.6 G/DL (ref 12–16)
IMM GRANULOCYTES # BLD AUTO: 0.09 K/UL (ref 0–0.11)
IMM GRANULOCYTES NFR BLD AUTO: 1.1 % (ref 0–0.9)
LYMPHOCYTES # BLD AUTO: 1.14 K/UL (ref 1–4.8)
LYMPHOCYTES NFR BLD: 14.6 % (ref 22–41)
MCH RBC QN AUTO: 31.7 PG (ref 27–33)
MCHC RBC AUTO-ENTMCNC: 32 G/DL (ref 33.6–35)
MCV RBC AUTO: 99.2 FL (ref 81.4–97.8)
MONOCYTES # BLD AUTO: 0.32 K/UL (ref 0–0.85)
MONOCYTES NFR BLD AUTO: 4.1 % (ref 0–13.4)
NEUTROPHILS # BLD AUTO: 6.25 K/UL (ref 2–7.15)
NEUTROPHILS NFR BLD: 79.8 % (ref 44–72)
NRBC # BLD AUTO: 0 K/UL
NRBC BLD-RTO: 0 /100 WBC
PLATELET # BLD AUTO: 127 K/UL (ref 164–446)
PMV BLD AUTO: 10.7 FL (ref 9–12.9)
POTASSIUM SERPL-SCNC: 3.8 MMOL/L (ref 3.6–5.5)
PROT SERPL-MCNC: 6 G/DL (ref 6–8.2)
RBC # BLD AUTO: 3.97 M/UL (ref 4.2–5.4)
SODIUM SERPL-SCNC: 139 MMOL/L (ref 135–145)
WBC # BLD AUTO: 7.8 K/UL (ref 4.8–10.8)

## 2019-09-13 PROCEDURE — 700101 HCHG RX REV CODE 250: Performed by: EMERGENCY MEDICINE

## 2019-09-13 PROCEDURE — 700111 HCHG RX REV CODE 636 W/ 250 OVERRIDE (IP): Performed by: EMERGENCY MEDICINE

## 2019-09-13 PROCEDURE — 80053 COMPREHEN METABOLIC PANEL: CPT

## 2019-09-13 PROCEDURE — 96374 THER/PROPH/DIAG INJ IV PUSH: CPT

## 2019-09-13 PROCEDURE — 85025 COMPLETE CBC W/AUTO DIFF WBC: CPT

## 2019-09-13 PROCEDURE — 96375 TX/PRO/DX INJ NEW DRUG ADDON: CPT

## 2019-09-13 PROCEDURE — 304561 HCHG STAT O2

## 2019-09-13 PROCEDURE — 99285 EMERGENCY DEPT VISIT HI MDM: CPT

## 2019-09-13 PROCEDURE — 71045 X-RAY EXAM CHEST 1 VIEW: CPT

## 2019-09-13 PROCEDURE — 94640 AIRWAY INHALATION TREATMENT: CPT

## 2019-09-13 RX ORDER — LORAZEPAM 2 MG/ML
1 INJECTION INTRAMUSCULAR ONCE
Status: COMPLETED | OUTPATIENT
Start: 2019-09-13 | End: 2019-09-13

## 2019-09-13 RX ORDER — METHYLPREDNISOLONE SODIUM SUCCINATE 125 MG/2ML
125 INJECTION, POWDER, LYOPHILIZED, FOR SOLUTION INTRAMUSCULAR; INTRAVENOUS ONCE
Status: COMPLETED | OUTPATIENT
Start: 2019-09-13 | End: 2019-09-13

## 2019-09-13 RX ADMIN — ALBUTEROL SULFATE 15 MG: 5 SOLUTION RESPIRATORY (INHALATION) at 14:31

## 2019-09-13 RX ADMIN — LORAZEPAM 1 MG: 2 INJECTION INTRAMUSCULAR; INTRAVENOUS at 15:36

## 2019-09-13 RX ADMIN — METHYLPREDNISOLONE SODIUM SUCCINATE 125 MG: 125 INJECTION, POWDER, FOR SOLUTION INTRAMUSCULAR; INTRAVENOUS at 14:53

## 2019-09-13 NOTE — ED NOTES
No more audible upper airway wheezing noted. Pt states to be feeling better. Pt medicated per orders, see MAR.

## 2019-09-13 NOTE — TELEPHONE ENCOUNTER
1. Caller Name: Kristin                      Call Back Number: [phone] (home)       2. Message: Pt called and was c/o wheezing and chest tightness since 10am this morning. She did her tx twice and used her rescue inhalers and its not helping.     Told pt to go to the ER to get evaluated. Pt understood.

## 2019-09-13 NOTE — ED PROVIDER NOTES
"ED Provider Note     Scribed for Sonal Bryant D.O. by Brook Nunes. 9/13/2019, 2:16 PM.     Primary care provider: Torres Brody M.D.  Means of arrival: Ambulance         History obtained from: Patient  History limited by: None    CHIEF COMPLAINT  Chief Complaint   Patient presents with   • Shortness of Breath     Hx of asthma; home meds not working       HPI  Kristin Balderrama is a 28 y.o. female with a history of asthma who presents to the emergency Department via for evaluation of shortness of breath onset 10 AM this morning. She states she has used her rescue inhaler twice and 1 nebulizer treatment with no alleviation. She endorses an associated productive cough and sputum production since her last two admissions. She notes her most recent admission was one week ago for similar complaints. Patient notes she is currently taking 15 mg Prednisone and is also taking Keflex. She states she is followed by Dr. Diaz (pulmonology). Patient denies any fever or chills.     REVIEW OF SYSTEMS  Pertinent positives include shortness of breath, cough, sputum production. Pertinent negatives include no fever or chills. .   See HPI for further details. All other systems are negative.    PAST MEDICAL HISTORY  Past Medical History:   Diagnosis Date   • Abdominal pain    • Anginal syndrome     random chest pain especially with tachycardia   • Apnea, sleep    • Arrhythmia     \"sinus tachycardia\", cariologist, Dr. Kumar; ablation 2/2016   • Arthritis     osteo   • ASTHMA     when around smoke   • Atrial fibrillation (HCC)    • Back pain    • Borderline personality disorder (HCC)    • Breath shortness     with tachycardia   • Bronchitis    • Cardiac arrhythmia    • Chickenpox    • Chronic UTI 9/18/2010   • Cough    • Daytime sleepiness    • Depression    • Diabetes (HCC)    • Diarrhea    • Disorder of thyroid    • Fall    • Fatigue    • Frequent headaches    • Gasping for breath    • Gynecological disorder     PCOS   • " "Headache(784.0)    • Heart burn    • History of falling    • Hypertension    • Indigestion    • Migraine    • 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HCC)    • 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HCC)    • Nausea    • Obesity    • Pain 08-15-12    back, 7/10   • Painful joint    • PCOS (polycystic ovarian syndrome)    • Pneumonia 2012   • Psychosis (HCC)    • Renal disorder     \"kidney disease, stage 1\" nephrologist, Dr. 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   • Urinary bladder disorder     Suprapubic cath   • Urinary incontinence    • Weakness    • Wears glasses        FAMILY HISTORY  Family History   Problem Relation Age of Onset   • Hypertension Mother    • Sleep Apnea Mother    • Heart Disease Mother    • Other Mother         hypothryod   • Hypertension Maternal Uncle    • Heart Disease Maternal Grandmother    • Hypertension Maternal Grandmother    • No Known Problems Sister    • Other Sister         Narcolepsy;fibromyalsia;bone;nerve   • Genitourinary () Problems Sister         endometriosis       SOCIAL HISTORY  Social History     Tobacco Use   • Smoking status: Never Smoker   • Smokeless tobacco: Never Used   Substance Use Topics   • Alcohol use: No     Alcohol/week: 0.0 oz   • Drug use: Yes     Frequency: 7.0 times per week     Types: Marijuana, Oral     Comment: Medicinal edible's      Social History     Substance and Sexual Activity   Drug Use Yes   • Frequency: 7.0 times per week   • Types: Marijuana, Oral    Comment: Medicinal edible's       SURGICAL HISTORY  Past Surgical History:   Procedure Laterality Date   • MUSCLE BIOPSY Right 1/26/2017    Procedure: MUSCLE BIOPSY - THIGH;  Surgeon: Isidro Vigil M.D.;  Location: SURGERY [facility];  Service:    • GASTROSCOPY WITH BALLOON DILATATION N/A " 1/4/2017    Procedure: GASTROSCOPY WITH DILATATION;  Surgeon: Torres Vargas M.D.;  Location: SURGERY [facility];  Service:    • BOWEL STIMULATOR INSERTION  7/13/2016    Procedure: BOWEL STIMULATOR INSERTION FOR PERMANENT INTERSTIM SACRAL IMPLANT;  Surgeon: Joe Noyola M.D.;  Location: SURGERY [facility];  Service:    • RECOVERY  1/27/2016    Procedure: CATH LAB EP STUDY WITH SINUS NODE MODIFICATION LA;  Surgeon: [facility];  [place] [facility]  Service:    • OTHER CARDIAC SURGERY  1/2016    cardiac ablation   • NEURO DEST FACET L/S W/IG SNGL  4/21/2015    Performed by Reza Tabor at SURGERY [facility]   • LUMBAR FUSION ANTERIOR  8/21/2012    Performed by SUSIE SAWANT at SURGERY [facility]S   • ALYSSA BY LAPAROSCOPY  8/29/2010    Performed by SHAYY JOHNS at SURGERY [facility] ORS   • LAMINOTOMY     • OTHER ABDOMINAL SURGERY     • TONSILLECTOMY      tonsillectomy       CURRENT MEDICATIONS  Current Outpatient Medications:   •  ziprasidone (GEODON) 80 MG Cap, Take 1 Cap by mouth 2 Times a Day., Disp: 60 Cap, Rfl: 1  •  montelukast (SINGULAIR) 10 MG Tab, Take 1 Tab by mouth every day., Disp: 90 Tab, Rfl: 3  •  tiotropium (SPIRIVA) 18 MCG Cap, Inhale 1 Cap by mouth every day., Disp: 90 Cap, Rfl: 3  •  predniSONE (DELTASONE) 20 MG Tab, Take 30 mg qday X5days, then 20 mg qday X5days, then baseline 15 mg qday indefinitely., Disp: 30 Tab, Rfl: 0  •  Folic Acid 0.8 MG Cap, Take 0.8 mg by mouth every day., Disp: , Rfl:   •  Ivabradine HCl (CORLANOR) 7.5 MG Tab, Take 7.5 mg by mouth 2 Times a Day., Disp: , Rfl:   •  ipratropium-albuterol (DUONEB) 0.5-2.5 (3) MG/3ML nebulizer solution, 3 mL by Nebulization route every 6 hours as needed for Shortness of Breath., Disp: 60 Bullet, Rfl: 1  •  budesonide-formoterol (SYMBICORT) 160-4.5 MCG/ACT Aerosol, Inhale 2 Puffs by mouth 2 Times a Day., Disp: 1 Inhaler, Rfl: 11  •  albuterol (PROVENTIL) 2.5mg/3ml Nebu Soln  solution for nebulization, 3 mL by Nebulization route every four hours as needed for Shortness of Breath., Disp: 100 mL, Rfl: 3  •  etonogestrel (NEXPLANON) 68 MG Implant implant, Inject 1 Each as instructed Once., Disp: , Rfl:   •  FLUoxetine (PROZAC) 20 MG Cap, Take 1 Cap by mouth every day., Disp: 30 Cap, Rfl: 2  •  mycophenolate (CELLCEPT) 500 MG tablet, Take 1,000 mg by mouth 2 times a day., Disp: , Rfl:   •  levothyroxine (SYNTHROID) 75 MCG Tab, Take 1 Tab by mouth Every morning on an empty stomach., Disp: 90 Tab, Rfl: 1  •  gabapentin (NEURONTIN) 300 MG Cap, Take 300 mg by mouth 4 times a day., Disp: , Rfl:   •  Diclofenac Sodium 1 % Gel, Apply 1 Application to skin as directed 4 times a day as needed. Apply's on wrist, back, ankles, and feet. , Disp: , Rfl:   •  Dulaglutide (TRULICITY) 0.75 MG/0.5ML Solution Pen-injector, Inject 0.75 mg as instructed every Friday., Disp: , Rfl:   •  ranitidine (ZANTAC) 300 MG tablet, Take 1 Tab by mouth every day., Disp: 60 Tab, Rfl: 3  •  ondansetron (ZOFRAN) 4 MG Tab tablet, Take 1 Tab by mouth every four hours as needed for Nausea/Vomiting., Disp: 20 Tab, Rfl: 3  •  sodium bicarbonate 325 MG Tab, Take 325-650 mg by mouth 3 times a day. 650 mg in  mg mid-afternoon 650 mg at night, Disp: , Rfl:   •  albuterol 108 (90 Base) MCG/ACT Aero Soln inhalation aerosol, Inhale 2 Puffs by mouth every 6 hours as needed for Shortness of Breath., Disp: , Rfl:   •  Melatonin 5 MG Tab, Take 10 mg by mouth every day., Disp: , Rfl:   •  furosemide (LASIX) 80 MG Tab, Take 80 mg by mouth every day., Disp: , Rfl:   •  busPIRone (BUSPAR) 5 MG tablet, TAKE ONE TABLET BY MOUTH TWICE DAILY, Disp: 60 Tab, Rfl: 4  •  methocarbamol (ROBAXIN) 750 MG Tab, Take 750 mg by mouth 3 times a day., Disp: , Rfl:   •  traZODone (DESYREL) 100 MG Tab, TAKE  ONE TABLET BY MOUTH NIGHTLY AT BEDTIME, Disp: 90 Tab, Rfl: 2  •  Diphenhydramine-APAP, sleep, (TYLENOL PM EXTRA STRENGTH PO), Take 2 Caps by mouth every  "evening., Disp: , Rfl:   •  LYRICA 300 MG capsule, Take 300 mg by mouth 2 times a day., Disp: , Rfl:   •  aspirin EC (ECOTRIN) 81 MG Tablet Delayed Response, Take 1 Tab by mouth every day., Disp: 30 Tab, Rfl: 6    ALLERGIES  Allergies   Allergen Reactions   • Cefdinir Shortness of Breath and Itching     Tolerated 1/18/17  Tolerates ceftriaxone    • Depakote [Divalproex Sodium] Unspecified     Muscle spasms/muscle pain and weakness     • Doxycycline Anaphylaxis and Vomiting     RXN=unknown   • Amitriptyline Unspecified     Headaches     • Aripiprazole [Abilify] Unspecified     Headaches/muscle twitching     • Ciprofloxacin Rash     Pt states \"I get a rash\".     • Clindamycin Nausea     Even with food     • Ees [Erythromycin] Vomiting and Nausea   • Flagyl [Metronidazole Hcl] Unspecified     \"eye problems\"     • Flomax [Tamsulosin Hydrochloride] Swelling   • Metformin Unspecified     Increased lactic acid      • Tape Rash     Tears skin off  coban with Tegaderm tape ok intermittently  RXN=ongoing   • Vancomycin Itching     Pt becomes flushed in face and chest.   RXN=7/10/16   • Wound Dressing Adhesive Hives     By pt report   • Cephalexin [Keflex] Rash     Pt states she gets a rash with this medication  Tolerates ceftriaxone   • Divalproex Sodium [Valproic Acid] Rash     .   • Levofloxacin Unspecified     Leg muscle cramps   • Metronidazole Rash     .   • Tamsulosin Hcl      Results for orders placed or performed during the hospital encounter of 09/13/19   CBC WITH DIFFERENTIAL   Result Value Ref Range    WBC 7.8 4.8 - 10.8 K/uL    RBC 3.97 (L) 4.20 - 5.40 M/uL    Hemoglobin 12.6 12.0 - 16.0 g/dL    Hematocrit 39.4 37.0 - 47.0 %    MCV 99.2 (H) 81.4 - 97.8 fL    MCH 31.7 27.0 - 33.0 pg    MCHC 32.0 (L) 33.6 - 35.0 g/dL    RDW 47.6 35.9 - 50.0 fL    Platelet Count 127 (L) 164 - 446 K/uL    MPV 10.7 9.0 - 12.9 fL    Neutrophils-Polys 79.80 (H) 44.00 - 72.00 %    Lymphocytes 14.60 (L) 22.00 - 41.00 %    Monocytes 4.10 " "0.00 - 13.40 %    Eosinophils 0.10 0.00 - 6.90 %    Basophils 0.30 0.00 - 1.80 %    Immature Granulocytes 1.10 (H) 0.00 - 0.90 %    Nucleated RBC 0.00 /100 WBC    Neutrophils (Absolute) 6.25 2.00 - 7.15 K/uL    Lymphs (Absolute) 1.14 1.00 - 4.80 K/uL    Monos (Absolute) 0.32 0.00 - 0.85 K/uL    Eos (Absolute) 0.01 0.00 - 0.51 K/uL    Baso (Absolute) 0.02 0.00 - 0.12 K/uL    Immature Granulocytes (abs) 0.09 0.00 - 0.11 K/uL    NRBC (Absolute) 0.00 K/uL   COMP METABOLIC PANEL   Result Value Ref Range    Sodium 139 135 - 145 mmol/L    Potassium 3.8 3.6 - 5.5 mmol/L    Chloride 108 96 - 112 mmol/L    Co2 16 (L) 20 - 33 mmol/L    Anion Gap 15.0 (H) 0.0 - 11.9    Glucose 240 (H) 65 - 99 mg/dL    Bun 18 8 - 22 mg/dL    Creatinine 0.93 0.50 - 1.40 mg/dL    Calcium 8.9 8.5 - 10.5 mg/dL    AST(SGOT) 14 12 - 45 U/L    ALT(SGPT) 58 (H) 2 - 50 U/L    Alkaline Phosphatase 34 30 - 99 U/L    Total Bilirubin 0.4 0.1 - 1.5 mg/dL    Albumin 4.3 3.2 - 4.9 g/dL    Total Protein 6.0 6.0 - 8.2 g/dL    Globulin 1.7 (L) 1.9 - 3.5 g/dL    A-G Ratio 2.5 g/dL   ESTIMATED GFR   Result Value Ref Range    GFR If African American >60 >60 mL/min/1.73 m 2    GFR If Non African American >60 >60 mL/min/1.73 m 2     *Note: Due to a large number of results and/or encounters for the requested time period, some results have not been displayed. A complete set of results can be found in Results Review.       PHYSICAL EXAM  VITAL SIGNS: /63   Pulse 92   Resp (!) 32   Ht 1.651 m (5' 5\")   Wt (!) 127 kg (280 lb)   SpO2 98%   BMI 46.59 kg/m²     Constitutional: Patient is a morbidly obese female in moderate distress from her breathing.  She appears to be anxious with audible wheezing.  HENT: Normocephalic, atraumatic. Bilateral external auditory canals normal. Nose normal. Oropharynx moist without erythema or exudates.  Eyes: PERRL, EOMI, Conjunctiva without erythema.   Neck: Supple. Normal range of motion in flexion, extension and lateral rotation. " No tenderness. No stridor  Lymphatic: No lymphadenopathy noted.   Cardiovascular: Tachycaridc.heart rate and Regular rhythm. No murmur.  Thorax & Lungs: Diminished breath sounds with tight expiratory wheezes diffuse. Able to speak full sentences. Accessory muscle use with sternal retraction and audible wheezing. Moderate respiratory distress. No rhonchi or rales. No chest tenderness  Abdomen: Obese abdomen. Bowel sounds normal in all four quadrants. Soft,nontender, no rebound , guarding, palpable masses.   Skin: Warm, Dry   Extremities: Peripheral pulses 4/4 No edema, No tenderness   Musculoskeletal: Normal range of motion in all major joints.   Neurologic: Alert & oriented x 3, Normal motor function, Normal sensory function  Psychiatric: Affect normal, Judgment normal, Mood anxious..       DIAGNOSTICS/PROCEDURES    LABS     Results for orders placed or performed during the hospital encounter of 09/13/19   CBC WITH DIFFERENTIAL   Result Value Ref Range    WBC 7.8 4.8 - 10.8 K/uL    RBC 3.97 (L) 4.20 - 5.40 M/uL    Hemoglobin 12.6 12.0 - 16.0 g/dL    Hematocrit 39.4 37.0 - 47.0 %    MCV 99.2 (H) 81.4 - 97.8 fL    MCH 31.7 27.0 - 33.0 pg    MCHC 32.0 (L) 33.6 - 35.0 g/dL    RDW 47.6 35.9 - 50.0 fL    Platelet Count 127 (L) 164 - 446 K/uL    MPV 10.7 9.0 - 12.9 fL    Neutrophils-Polys 79.80 (H) 44.00 - 72.00 %    Lymphocytes 14.60 (L) 22.00 - 41.00 %    Monocytes 4.10 0.00 - 13.40 %    Eosinophils 0.10 0.00 - 6.90 %    Basophils 0.30 0.00 - 1.80 %    Immature Granulocytes 1.10 (H) 0.00 - 0.90 %    Nucleated RBC 0.00 /100 WBC    Neutrophils (Absolute) 6.25 2.00 - 7.15 K/uL    Lymphs (Absolute) 1.14 1.00 - 4.80 K/uL    Monos (Absolute) 0.32 0.00 - 0.85 K/uL    Eos (Absolute) 0.01 0.00 - 0.51 K/uL    Baso (Absolute) 0.02 0.00 - 0.12 K/uL    Immature Granulocytes (abs) 0.09 0.00 - 0.11 K/uL    NRBC (Absolute) 0.00 K/uL   COMP METABOLIC PANEL   Result Value Ref Range    Sodium 139 135 - 145 mmol/L    Potassium 3.8 3.6 - 5.5  mmol/L    Chloride 108 96 - 112 mmol/L    Co2 16 (L) 20 - 33 mmol/L    Anion Gap 15.0 (H) 0.0 - 11.9    Glucose 240 (H) 65 - 99 mg/dL    Bun 18 8 - 22 mg/dL    Creatinine 0.93 0.50 - 1.40 mg/dL    Calcium 8.9 8.5 - 10.5 mg/dL    AST(SGOT) 14 12 - 45 U/L    ALT(SGPT) 58 (H) 2 - 50 U/L    Alkaline Phosphatase 34 30 - 99 U/L    Total Bilirubin 0.4 0.1 - 1.5 mg/dL    Albumin 4.3 3.2 - 4.9 g/dL    Total Protein 6.0 6.0 - 8.2 g/dL    Globulin 1.7 (L) 1.9 - 3.5 g/dL    A-G Ratio 2.5 g/dL   ESTIMATED GFR   Result Value Ref Range    GFR If African American >60 >60 mL/min/1.73 m 2    GFR If Non African American >60 >60 mL/min/1.73 m 2     *Note: Due to a large number of results and/or encounters for the requested time period, some results have not been displayed. A complete set of results can be found in Results Review.       Labs reviewed by me      RADIOLOGY/PROCEDURES  DX-CHEST-PORTABLE (1 VIEW)   Final Result      No acute cardiopulmonary abnormality.        Results and radiologist interpretation reviewed by me.     COURSE & MEDICAL DECISION MAKING  Pertinent Labs & Imaging studies reviewed. (See chart for details)    2:16 PM - Patient seen and evaluated at bedside. Ordered for CMP, CBC with differential, DX chest to evaluate. Patient will be treated with Proventil 15 mg and Solu medrol 125 mg injection for her symptoms. Differential diagnoses include, but are not limited to: anxiety, asthma exacerbation, bronchitis.     3:23 PM Patient was reevaluated at bedside. Patient is resting in bed. She is feeling mildly improved, however is still short of breath. She will be treated with Ativan for anxiety.     5:05 PM Patient was reevaluated at bedside. Patient is feeling improved. Discussed lab  and radiology  results with the patient and informed them that results were reassuring. I recommended the patient to drink plenty of fluids, place a cool mist humidifier at the bedside, no dairy products for the next several days,  continue her current nebulized treatments and her steroids and follow up with their regular doctor.The patient will return for new or worsening symptoms and is stable at the time of discharge..      DISPOSITION:  Patient will be discharged home in stable condition.    FOLLOW UP:  Torres Brody M.D.  [address] [place] [address]  [place] [address]  [phone]    Schedule an appointment as soon as possible for a visit in 1 week  As needed, If symptoms worsen        FINAL IMPRESSION  1. Moderate asthma with acute exacerbation, unspecified whether persistent    2. Anxiety         Brook RODRIGUEZ (Carolibe), am scribing for, and in the presence of, Sonal Bryant D.O..    Electronically signed by: Brook Nunes (Eva), 9/13/2019    Sonal RODRIGUEZ D.O. personally performed the services described in this documentation, as scribed by Brook Nunes in my presence, and it is both accurate and complete. C    The note accurately reflects work and decisions made by me.  Sonal Bryant  9/13/2019  7:33 PM

## 2019-09-13 NOTE — ED NOTES
Pt ambulated to the restroom with steady gait. Pt back to bed without incident and placed back on monitor.

## 2019-09-13 NOTE — ED NOTES
PIV placed. Pt tolerated well. Blood obtained and sent. Pt still has upper airway wheezing despite SVN admin. Few ice chips provided and blanket. Mother at bedside. No other needs expressed at this time. Pending results.

## 2019-09-13 NOTE — ED NOTES
Pt presents to ED via EMS for c/o an asthma attack. Pt states she tried her rescue INH at home without success. Lung sounds are clear however pt has upper airway wheezing. Pt talking in 1-2 word sentences and is unable to complete a full sentence at this time. Pt placed in gown and on monitor.

## 2019-09-13 NOTE — FLOWSHEET NOTE
09/13/19 1433   Interdisciplinary Plan of Care-Goals (Indications)   Bronchodilator Indications Physical Exam / Hyperinflation / Wheezing (bronchospasm)   Interdisciplinary Plan of Care-Outcomes    Bronchodilator Outcome Improved Vital Signs and Measures of Gas Exchange;Improved Patient Appearance with Decreased use of Accessory Muscles   Education   Education Yes - Pt. / Family has been Instructed in use of Respiratory Medications and Adverse Reactions   RT Assessment of Delivered Medications   Evaluation of Medication Delivery Daily Yes-- Pt /Family has been Instructed in use of Respiratory Medications and Adverse Reactions   SVN Group   #SVN Performed Yes   Given By: Mask   Date SVN Last Changed 09/13/19   Date SVN Next Change Due (Q 7 Days) 09/20/19   Continuous Nebulizer Group   Continuous Nebulizer Q 1 Hour Yes   #Bronchodilator Yes   Respiratory WDL   Respiratory (WDL) X   Chest Exam   Work Of Breathing / Effort Mild;Increased Work of Breathing   Respiration (!) 28   Pulse 91   Breath Sounds   Pre/Post Intervention Pre Intervention Assessment   RUL Breath Sounds Diminished   RML Breath Sounds Diminished   RLL Breath Sounds Diminished   MARY Breath Sounds Diminished   LLL Breath Sounds Diminished   Oximetry   Continuous Oximetry Yes   O2 Alarms Set & Reviewed Yes   Oxygen   Pulse Oximetry 97 %   O2 (LPM) 2   O2 Daily Delivery Respiratory  Nasal Cannula

## 2019-09-14 NOTE — ED NOTES
PIV removed. Catheter intact. Dressing applied. Bleeding controlled. D/C instructions reviewed with pt. Pt states understanding and need for follow-up. Pt left via wheelchair. Mother at side and will drive home.

## 2019-09-14 NOTE — DISCHARGE INSTRUCTIONS
Continue your current treatment regimen at home  Cool mist humidifier at your bedside  No dairy products for the next 3 to 4 days  Make sure that you are drinking plenty of water every day even if you have to mix lemon, lime or orange in it to make it palatable.  You need to drink at least 64 ounces of water per day.  This will help with the mucus plugging that you get from being asthmatic.  Follow-up with your doctor this next week for recheck and return if any problems or worsening i.e. fever greater than 101, productive cough or worsening symptoms

## 2019-09-17 ENCOUNTER — HOSPITAL ENCOUNTER (OUTPATIENT)
Facility: MEDICAL CENTER | Age: 29
End: 2019-09-17
Attending: INTERNAL MEDICINE
Payer: MEDICARE

## 2019-09-17 ENCOUNTER — OFFICE VISIT (OUTPATIENT)
Dept: MEDICAL GROUP | Facility: MEDICAL CENTER | Age: 29
End: 2019-09-17
Payer: MEDICARE

## 2019-09-17 VITALS
HEIGHT: 65 IN | WEIGHT: 277.6 LBS | HEART RATE: 87 BPM | OXYGEN SATURATION: 95 % | SYSTOLIC BLOOD PRESSURE: 110 MMHG | BODY MASS INDEX: 46.25 KG/M2 | TEMPERATURE: 98.5 F | DIASTOLIC BLOOD PRESSURE: 64 MMHG

## 2019-09-17 DIAGNOSIS — R30.0 DYSURIA: ICD-10-CM

## 2019-09-17 PROBLEM — R29.898 WEAKNESS OF RIGHT UPPER EXTREMITY: Status: RESOLVED | Noted: 2017-02-23 | Resolved: 2019-09-17

## 2019-09-17 PROBLEM — R29.898 LEFT LEG WEAKNESS: Status: RESOLVED | Noted: 2018-07-14 | Resolved: 2019-09-17

## 2019-09-17 LAB
APPEARANCE UR: NORMAL
BILIRUB UR STRIP-MCNC: NORMAL MG/DL
COLOR UR AUTO: NORMAL
GLUCOSE UR STRIP.AUTO-MCNC: NORMAL MG/DL
KETONES UR STRIP.AUTO-MCNC: NORMAL MG/DL
LEUKOCYTE ESTERASE UR QL STRIP.AUTO: NORMAL
NITRITE UR QL STRIP.AUTO: NORMAL
PH UR STRIP.AUTO: 5 [PH] (ref 5–8)
PROT UR QL STRIP: NORMAL MG/DL
RBC UR QL AUTO: NORMAL
SP GR UR STRIP.AUTO: >=1.03
UROBILINOGEN UR STRIP-MCNC: NORMAL MG/DL

## 2019-09-17 PROCEDURE — 99214 OFFICE O/P EST MOD 30 MIN: CPT | Performed by: INTERNAL MEDICINE

## 2019-09-17 PROCEDURE — 87086 URINE CULTURE/COLONY COUNT: CPT

## 2019-09-17 PROCEDURE — 81002 URINALYSIS NONAUTO W/O SCOPE: CPT | Performed by: INTERNAL MEDICINE

## 2019-09-17 NOTE — PROGRESS NOTES
CC: Dysuria and follow-up asthma.                                                                                                                                      HPI:   Kristin presents today with the following.    1. Dysuria  Presents reporting some mild discomfort with urination.  She is currently on K flex for a skin infection from her dermatologist.  Denies any current fevers or chills no flank pain she does have frequent urinary complaints and not always infected.    2. Moderate intermittent asthma without complication  She was seen again in the emergency room for asthma exacerbation which was felt secondary to at least partial anxiety.  She does report her breathing is at baseline still maintain on prednisone denying any other breathing complaints today.  Reviewing hospital records no other major findings while in the emergency room.      Patient Active Problem List    Diagnosis Date Noted   • Breast wound 11/06/2017     Priority: High   • Controlled type 2 diabetes mellitus without complication, without long-term current use of insulin (Formerly [facility]) 04/26/2017     Priority: High   • Sinus tachycardia 10/31/2013     Priority: High   • Morbid obesity with BMI of 45.0-49.9, adult (Formerly [facility]) 10/24/2017     Priority: Low   • Depression 10/28/2016     Priority: Low   • Schizophrenia (Formerly [facility]) 10/27/2016     Priority: Low   • PCOS (polycystic ovarian syndrome) 11/23/2015     Priority: Low   • Progressive focal motor weakness 06/28/2015     Priority: Low   • Fatty liver disease, nonalcoholic 01/19/2015     Priority: Low   • Anxiety 12/16/2014     Priority: Low   • Knee pain, right 02/13/2014     Priority: Low   • Neurogenic bladder 04/02/2011     Priority: Low   • Borderline personality disorder in adult (Formerly [facility]) 09/18/2010     Priority: Low   • Uncontrolled type 2 diabetes mellitus with hyperglycemia (Formerly [facility]) 08/29/2019   • Moderate intermittent asthma without complication 06/20/2019   • Inappropriate sinus tachycardia  04/10/2019   • Palpitations 10/01/2018   • Chronic respiratory failure with hypoxia, on home oxygen therapy ([facility]) 08/08/2018   • TONYA (obstructive sleep apnea) 01/09/2018   • Functional diarrhea 01/05/2018   • Acquired hypothyroidism 08/04/2017   • Hashimoto's encephalopathy 05/17/2017   • Hypovitaminosis D 11/29/2016   • Bowel and bladder incontinence 10/27/2016   • Galactorrhea 07/22/2016   • Weakness of both lower extremities 06/22/2016   • Morbidly obese ([facility]) 03/07/2016   • Scoliosis 03/07/2016   • GERD (gastroesophageal reflux disease) 03/07/2016   • Peripheral neuropathy (CMS-HCC) 03/06/2016   • H/O prior ablation treatment 02/10/2016       Current Outpatient Medications   Medication Sig Dispense Refill   • ziprasidone (GEODON) 80 MG Cap Take 1 Cap by mouth 2 Times a Day. 60 Cap 1   • montelukast (SINGULAIR) 10 MG Tab Take 1 Tab by mouth every day. 90 Tab 3   • tiotropium (SPIRIVA) 18 MCG Cap Inhale 1 Cap by mouth every day. 90 Cap 3   • predniSONE (DELTASONE) 20 MG Tab Take 30 mg qday X5days, then 20 mg qday X5days, then baseline 15 mg qday indefinitely. 30 Tab 0   • Folic Acid 0.8 MG Cap Take 0.8 mg by mouth every day.     • Ivabradine HCl (CORLANOR) 7.5 MG Tab Take 7.5 mg by mouth 2 Times a Day.     • ipratropium-albuterol (DUONEB) 0.5-2.5 (3) MG/3ML nebulizer solution 3 mL by Nebulization route every 6 hours as needed for Shortness of Breath. 60 Bullet 1   • budesonide-formoterol (SYMBICORT) 160-4.5 MCG/ACT Aerosol Inhale 2 Puffs by mouth 2 Times a Day. 1 Inhaler 11   • albuterol (PROVENTIL) 2.5mg/3ml Nebu Soln solution for nebulization 3 mL by Nebulization route every four hours as needed for Shortness of Breath. 100 mL 3   • etonogestrel (NEXPLANON) 68 MG Implant implant Inject 1 Each as instructed Once.     • FLUoxetine (PROZAC) 20 MG Cap Take 1 Cap by mouth every day. 30 Cap 2   • mycophenolate (CELLCEPT) 500 MG tablet Take 1,000 mg by mouth 2 times a day.     • levothyroxine (SYNTHROID) 75 MCG Tab  Take 1 Tab by mouth Every morning on an empty stomach. 90 Tab 1   • gabapentin (NEURONTIN) 300 MG Cap Take 300 mg by mouth 4 times a day.     • Diclofenac Sodium 1 % Gel Apply 1 Application to skin as directed 4 times a day as needed. Apply's on wrist, back, ankles, and feet.      • Dulaglutide (TRULICITY) 0.75 MG/0.5ML Solution Pen-injector Inject 0.75 mg as instructed every Friday.     • ranitidine (ZANTAC) 300 MG tablet Take 1 Tab by mouth every day. 60 Tab 3   • ondansetron (ZOFRAN) 4 MG Tab tablet Take 1 Tab by mouth every four hours as needed for Nausea/Vomiting. 20 Tab 3   • sodium bicarbonate 325 MG Tab Take 325-650 mg by mouth 3 times a day. 650 mg in AM  325 mg mid-afternoon  650 mg at night     • albuterol 108 (90 Base) MCG/ACT Aero Soln inhalation aerosol Inhale 2 Puffs by mouth every 6 hours as needed for Shortness of Breath.     • Melatonin 5 MG Tab Take 10 mg by mouth every day.     • furosemide (LASIX) 80 MG Tab Take 80 mg by mouth every day.     • busPIRone (BUSPAR) 5 MG tablet TAKE ONE TABLET BY MOUTH TWICE DAILY 60 Tab 4   • methocarbamol (ROBAXIN) 750 MG Tab Take 750 mg by mouth 3 times a day.     • traZODone (DESYREL) 100 MG Tab TAKE  ONE TABLET BY MOUTH NIGHTLY AT BEDTIME 90 Tab 2   • Diphenhydramine-APAP, sleep, (TYLENOL PM EXTRA STRENGTH PO) Take 2 Caps by mouth every evening.     • LYRICA 300 MG capsule Take 300 mg by mouth 2 times a day.     • aspirin EC (ECOTRIN) 81 MG Tablet Delayed Response Take 1 Tab by mouth every day. 30 Tab 6     No current facility-administered medications for this visit.          Allergies as of 09/17/2019 - Reviewed 09/17/2019   Allergen Reaction Noted   • Cefdinir Shortness of Breath and Itching 03/01/2016   • Depakote [divalproex sodium] Unspecified 06/14/2010   • Doxycycline Anaphylaxis and Vomiting 08/15/2012   • Amitriptyline Unspecified 10/31/2013   • Aripiprazole [abilify] Unspecified 01/17/2013   • Ciprofloxacin Rash 12/17/2009   • Clindamycin Nausea  "02/02/2011   • Ees [erythromycin] Vomiting and Nausea 08/28/2010   • Flagyl [metronidazole hcl] Unspecified 03/31/2011   • Flomax [tamsulosin hydrochloride] Swelling 09/24/2009   • Metformin Unspecified 07/23/2013   • Tape Rash 08/15/2012   • Vancomycin Itching 07/10/2016   • Wound dressing adhesive Hives 01/12/2018   • Cephalexin [keflex] Rash 01/01/2017   • Divalproex sodium [valproic acid] Rash 03/30/2017   • Levofloxacin Unspecified 10/27/2016   • Metronidazole Rash 03/30/2017   • Tamsulosin hcl  09/19/2010        ROS: Denies Chest pain, SOB, LE edema.    /64 (BP Location: Right arm, Patient Position: Sitting, BP Cuff Size: Adult)   Pulse 87   Temp 36.9 °C (98.5 °F) (Temporal)   Ht 1.651 m (5' 5\")   Wt (!) 125.9 kg (277 lb 9.6 oz)   SpO2 95%   BMI 46.20 kg/m²     Physical Exam:  Gen:         Alert and oriented, No apparent distress.  Neck:        No Lymphadenopathy or Bruits.  Lungs:     Clear to auscultation bilaterally  CV:          Regular rate and rhythm. No murmurs, rubs or gallops.               Ext:          No clubbing, cyanosis, edema.      Assessment and Plan.   28 y.o. female with the following issues.    1. Dysuria  Urine does show positive for esterase will await cultures before treating given that she is already on K flex.  - URINE CULTURE(NEW); Future  - POCT Urinalysis    2. Moderate intermittent asthma without complication  Clinically improving continue to monitor.      "

## 2019-09-18 DIAGNOSIS — R30.0 DYSURIA: ICD-10-CM

## 2019-09-20 ENCOUNTER — TELEPHONE (OUTPATIENT)
Dept: MEDICAL GROUP | Facility: MEDICAL CENTER | Age: 29
End: 2019-09-20

## 2019-09-20 ENCOUNTER — TELEPHONE (OUTPATIENT)
Dept: BEHAVIORAL HEALTH | Facility: CLINIC | Age: 29
End: 2019-09-20

## 2019-09-20 LAB
BACTERIA UR CULT: NORMAL
SIGNIFICANT IND 70042: NORMAL
SITE SITE: NORMAL
SOURCE SOURCE: NORMAL

## 2019-09-20 RX ORDER — FLUOXETINE HYDROCHLORIDE 40 MG/1
40 CAPSULE ORAL DAILY
Qty: 30 CAP | Refills: 1 | Status: SHIPPED | OUTPATIENT
Start: 2019-09-20 | End: 2019-11-06

## 2019-09-20 RX ORDER — BUSPIRONE HYDROCHLORIDE 10 MG/1
10 TABLET ORAL 2 TIMES DAILY
Qty: 60 TAB | Refills: 1 | Status: SHIPPED | OUTPATIENT
Start: 2019-09-20 | End: 2019-11-06

## 2019-09-20 NOTE — TELEPHONE ENCOUNTER
Spoke with the patient who has been feeling a lot more anxious.  Upon chart review she was in the hospital less than a month ago for asthma exacerbation for which she received IV steroids.  She is currently on 15 mg oral steroids and she states that it is forward for asthma and optic neuritis.  She is compliant with her psychotropic medications but anxiety has been really overwhelming lately.  Discussed increasing her dose of Prozac to 40 mg and Buspar to 10 mg twice daily and she was agreeable.    ----- Message from Maria Esther Morales sent at 9/20/2019  8:21 AM PDT -----  Regarding: ANXIETY  Contact: [phone]  DR NAPIER PT - CALLED VERY PANICKY, STATING HER ANXIETY IS OFF THE CHARTS - CAUSING ASTHMA TO ACT UP, SHE IS PICKING AWAY AT HER FINGERNAILS, SHE HAD 2 PANIC ATTACKS LAST NIGHT, STATED THE BUSPAR IS NOT WORKING, ONLY ON 5 MG, WANTS TO INCREASE IT IF POSSIBLE. OTHER 2 MEDS ARE FINE.

## 2019-09-20 NOTE — TELEPHONE ENCOUNTER
VOICEMAIL  1. Caller Name: Kristin                      Call Back Number: [phone]    2. Message: Patient called and is running a fever of 101. She just had her flu shot the other day and was wondering when she needed to start to be concerned. Spoke with Dr. Brody and he stated it is not from the flu shot. He last urine culture from 09/17/2019 was negative also for UTI. Relayed message to patient.    3. Patient approves office to leave a detailed voicemail/MyChart message: N\A

## 2019-09-25 ENCOUNTER — HOSPITAL ENCOUNTER (OUTPATIENT)
Dept: LAB | Facility: MEDICAL CENTER | Age: 29
DRG: 189 | End: 2019-09-25
Attending: NURSE PRACTITIONER
Payer: MEDICARE

## 2019-09-25 ENCOUNTER — HOSPITAL ENCOUNTER (INPATIENT)
Facility: MEDICAL CENTER | Age: 29
LOS: 6 days | DRG: 189 | End: 2019-10-01
Attending: EMERGENCY MEDICINE | Admitting: HOSPITALIST
Payer: MEDICARE

## 2019-09-25 ENCOUNTER — APPOINTMENT (OUTPATIENT)
Dept: RADIOLOGY | Facility: MEDICAL CENTER | Age: 29
DRG: 189 | End: 2019-09-25
Attending: EMERGENCY MEDICINE
Payer: MEDICARE

## 2019-09-25 DIAGNOSIS — R06.02 SOB (SHORTNESS OF BREATH): ICD-10-CM

## 2019-09-25 DIAGNOSIS — R10.30 LOWER ABDOMINAL PAIN: ICD-10-CM

## 2019-09-25 DIAGNOSIS — R06.1 STRIDOR: ICD-10-CM

## 2019-09-25 DIAGNOSIS — R53.81 DEBILITY: ICD-10-CM

## 2019-09-25 LAB
25(OH)D3 SERPL-MCNC: 20 NG/ML (ref 30–100)
ALBUMIN SERPL BCP-MCNC: 4.6 G/DL (ref 3.2–4.9)
ALBUMIN SERPL BCP-MCNC: 4.9 G/DL (ref 3.2–4.9)
ALBUMIN/GLOB SERPL: 2.4 G/DL
ALBUMIN/GLOB SERPL: 2.7 G/DL
ALP SERPL-CCNC: 32 U/L (ref 30–99)
ALP SERPL-CCNC: 33 U/L (ref 30–99)
ALT SERPL-CCNC: 41 U/L (ref 2–50)
ALT SERPL-CCNC: 43 U/L (ref 2–50)
ANION GAP SERPL CALC-SCNC: 14 MMOL/L (ref 0–11.9)
ANION GAP SERPL CALC-SCNC: 14 MMOL/L (ref 0–11.9)
APPEARANCE UR: ABNORMAL
AST SERPL-CCNC: 15 U/L (ref 12–45)
AST SERPL-CCNC: 29 U/L (ref 12–45)
BACTERIA #/AREA URNS HPF: NEGATIVE /HPF
BASOPHILS # BLD AUTO: 0.3 % (ref 0–1.8)
BASOPHILS # BLD AUTO: 0.4 % (ref 0–1.8)
BASOPHILS # BLD: 0.03 K/UL (ref 0–0.12)
BASOPHILS # BLD: 0.03 K/UL (ref 0–0.12)
BILIRUB SERPL-MCNC: 0.4 MG/DL (ref 0.1–1.5)
BILIRUB SERPL-MCNC: 0.5 MG/DL (ref 0.1–1.5)
BILIRUB UR QL STRIP.AUTO: NEGATIVE
BLOOD CULTURE HOLD CXBCH: NORMAL
BUN SERPL-MCNC: 11 MG/DL (ref 8–22)
BUN SERPL-MCNC: 9 MG/DL (ref 8–22)
CALCIUM SERPL-MCNC: 9.6 MG/DL (ref 8.5–10.5)
CALCIUM SERPL-MCNC: 9.7 MG/DL (ref 8.5–10.5)
CHLORIDE SERPL-SCNC: 107 MMOL/L (ref 96–112)
CHLORIDE SERPL-SCNC: 110 MMOL/L (ref 96–112)
CO2 SERPL-SCNC: 16 MMOL/L (ref 20–33)
CO2 SERPL-SCNC: 20 MMOL/L (ref 20–33)
COLOR UR: YELLOW
CREAT SERPL-MCNC: 0.8 MG/DL (ref 0.5–1.4)
CREAT SERPL-MCNC: 0.81 MG/DL (ref 0.5–1.4)
EOSINOPHIL # BLD AUTO: 0.2 K/UL (ref 0–0.51)
EOSINOPHIL # BLD AUTO: 0.21 K/UL (ref 0–0.51)
EOSINOPHIL NFR BLD: 2.3 % (ref 0–6.9)
EOSINOPHIL NFR BLD: 2.7 % (ref 0–6.9)
EPI CELLS #/AREA URNS HPF: ABNORMAL /HPF
ERYTHROCYTE [DISTWIDTH] IN BLOOD BY AUTOMATED COUNT: 46.5 FL (ref 35.9–50)
ERYTHROCYTE [DISTWIDTH] IN BLOOD BY AUTOMATED COUNT: 48.4 FL (ref 35.9–50)
GLOBULIN SER CALC-MCNC: 1.8 G/DL (ref 1.9–3.5)
GLOBULIN SER CALC-MCNC: 1.9 G/DL (ref 1.9–3.5)
GLUCOSE BLD-MCNC: 131 MG/DL (ref 65–99)
GLUCOSE BLD-MCNC: 148 MG/DL (ref 65–99)
GLUCOSE BLD-MCNC: 155 MG/DL (ref 65–99)
GLUCOSE SERPL-MCNC: 140 MG/DL (ref 65–99)
GLUCOSE SERPL-MCNC: 89 MG/DL (ref 65–99)
GLUCOSE UR STRIP.AUTO-MCNC: NEGATIVE MG/DL
HCT VFR BLD AUTO: 42.8 % (ref 37–47)
HCT VFR BLD AUTO: 43.8 % (ref 37–47)
HGB BLD-MCNC: 13.8 G/DL (ref 12–16)
HGB BLD-MCNC: 14 G/DL (ref 12–16)
IMM GRANULOCYTES # BLD AUTO: 0.06 K/UL (ref 0–0.11)
IMM GRANULOCYTES # BLD AUTO: 0.07 K/UL (ref 0–0.11)
IMM GRANULOCYTES NFR BLD AUTO: 0.7 % (ref 0–0.9)
IMM GRANULOCYTES NFR BLD AUTO: 0.9 % (ref 0–0.9)
KETONES UR STRIP.AUTO-MCNC: NEGATIVE MG/DL
LEUKOCYTE ESTERASE UR QL STRIP.AUTO: NEGATIVE
LYMPHOCYTES # BLD AUTO: 2.89 K/UL (ref 1–4.8)
LYMPHOCYTES # BLD AUTO: 3.24 K/UL (ref 1–4.8)
LYMPHOCYTES NFR BLD: 33.2 % (ref 22–41)
LYMPHOCYTES NFR BLD: 42 % (ref 22–41)
MAGNESIUM SERPL-MCNC: 2 MG/DL (ref 1.5–2.5)
MCH RBC QN AUTO: 31.7 PG (ref 27–33)
MCH RBC QN AUTO: 32.4 PG (ref 27–33)
MCHC RBC AUTO-ENTMCNC: 32 G/DL (ref 33.6–35)
MCHC RBC AUTO-ENTMCNC: 32.2 G/DL (ref 33.6–35)
MCV RBC AUTO: 101.4 FL (ref 81.4–97.8)
MCV RBC AUTO: 98.2 FL (ref 81.4–97.8)
MICRO URNS: ABNORMAL
MONOCYTES # BLD AUTO: 0.45 K/UL (ref 0–0.85)
MONOCYTES # BLD AUTO: 0.45 K/UL (ref 0–0.85)
MONOCYTES NFR BLD AUTO: 5.2 % (ref 0–13.4)
MONOCYTES NFR BLD AUTO: 5.8 % (ref 0–13.4)
MUCOUS THREADS #/AREA URNS HPF: ABNORMAL /HPF
NEUTROPHILS # BLD AUTO: 3.71 K/UL (ref 2–7.15)
NEUTROPHILS # BLD AUTO: 5.08 K/UL (ref 2–7.15)
NEUTROPHILS NFR BLD: 48.2 % (ref 44–72)
NEUTROPHILS NFR BLD: 58.3 % (ref 44–72)
NITRITE UR QL STRIP.AUTO: NEGATIVE
NRBC # BLD AUTO: 0 K/UL
NRBC # BLD AUTO: 0 K/UL
NRBC BLD-RTO: 0 /100 WBC
NRBC BLD-RTO: 0 /100 WBC
PH UR STRIP.AUTO: 5.5 [PH] (ref 5–8)
PHOSPHATE SERPL-MCNC: 2.8 MG/DL (ref 2.5–4.5)
PLATELET # BLD AUTO: 181 K/UL (ref 164–446)
PLATELET # BLD AUTO: 189 K/UL (ref 164–446)
PMV BLD AUTO: 11.3 FL (ref 9–12.9)
PMV BLD AUTO: 11.3 FL (ref 9–12.9)
POTASSIUM SERPL-SCNC: 3.8 MMOL/L (ref 3.6–5.5)
POTASSIUM SERPL-SCNC: 4.1 MMOL/L (ref 3.6–5.5)
PROT SERPL-MCNC: 6.5 G/DL (ref 6–8.2)
PROT SERPL-MCNC: 6.7 G/DL (ref 6–8.2)
PROT UR QL STRIP: NEGATIVE MG/DL
PTH-INTACT SERPL-MCNC: 49.8 PG/ML (ref 14–72)
RBC # BLD AUTO: 4.32 M/UL (ref 4.2–5.4)
RBC # BLD AUTO: 4.36 M/UL (ref 4.2–5.4)
RBC # URNS HPF: ABNORMAL /HPF
RBC UR QL AUTO: ABNORMAL
SODIUM SERPL-SCNC: 140 MMOL/L (ref 135–145)
SODIUM SERPL-SCNC: 141 MMOL/L (ref 135–145)
SP GR UR STRIP.AUTO: 1.03
URATE SERPL-MCNC: 5 MG/DL (ref 1.9–8.2)
UROBILINOGEN UR STRIP.AUTO-MCNC: 0.2 MG/DL
WBC # BLD AUTO: 7.7 K/UL (ref 4.8–10.8)
WBC # BLD AUTO: 8.7 K/UL (ref 4.8–10.8)
WBC #/AREA URNS HPF: ABNORMAL /HPF

## 2019-09-25 PROCEDURE — 84100 ASSAY OF PHOSPHORUS: CPT

## 2019-09-25 PROCEDURE — 82306 VITAMIN D 25 HYDROXY: CPT

## 2019-09-25 PROCEDURE — 99223 1ST HOSP IP/OBS HIGH 75: CPT | Performed by: HOSPITALIST

## 2019-09-25 PROCEDURE — 70360 X-RAY EXAM OF NECK: CPT

## 2019-09-25 PROCEDURE — 99291 CRITICAL CARE FIRST HOUR: CPT

## 2019-09-25 PROCEDURE — 700111 HCHG RX REV CODE 636 W/ 250 OVERRIDE (IP): Performed by: INTERNAL MEDICINE

## 2019-09-25 PROCEDURE — 81001 URINALYSIS AUTO W/SCOPE: CPT

## 2019-09-25 PROCEDURE — 94760 N-INVAS EAR/PLS OXIMETRY 1: CPT

## 2019-09-25 PROCEDURE — 83735 ASSAY OF MAGNESIUM: CPT

## 2019-09-25 PROCEDURE — 94640 AIRWAY INHALATION TREATMENT: CPT

## 2019-09-25 PROCEDURE — 84550 ASSAY OF BLOOD/URIC ACID: CPT

## 2019-09-25 PROCEDURE — 71045 X-RAY EXAM CHEST 1 VIEW: CPT

## 2019-09-25 PROCEDURE — 304561 HCHG STAT O2

## 2019-09-25 PROCEDURE — 85025 COMPLETE CBC W/AUTO DIFF WBC: CPT

## 2019-09-25 PROCEDURE — A9270 NON-COVERED ITEM OR SERVICE: HCPCS | Performed by: HOSPITALIST

## 2019-09-25 PROCEDURE — 82570 ASSAY OF URINE CREATININE: CPT

## 2019-09-25 PROCEDURE — 700101 HCHG RX REV CODE 250

## 2019-09-25 PROCEDURE — 36415 COLL VENOUS BLD VENIPUNCTURE: CPT

## 2019-09-25 PROCEDURE — 83970 ASSAY OF PARATHORMONE: CPT

## 2019-09-25 PROCEDURE — 770022 HCHG ROOM/CARE - ICU (200)

## 2019-09-25 PROCEDURE — 80053 COMPREHEN METABOLIC PANEL: CPT

## 2019-09-25 PROCEDURE — 84156 ASSAY OF PROTEIN URINE: CPT

## 2019-09-25 PROCEDURE — 700101 HCHG RX REV CODE 250: Performed by: HOSPITALIST

## 2019-09-25 PROCEDURE — 80053 COMPREHEN METABOLIC PANEL: CPT | Mod: 91

## 2019-09-25 PROCEDURE — 85025 COMPLETE CBC W/AUTO DIFF WBC: CPT | Mod: 91

## 2019-09-25 PROCEDURE — 82962 GLUCOSE BLOOD TEST: CPT | Mod: 91

## 2019-09-25 PROCEDURE — 96374 THER/PROPH/DIAG INJ IV PUSH: CPT

## 2019-09-25 PROCEDURE — 700111 HCHG RX REV CODE 636 W/ 250 OVERRIDE (IP): Performed by: HOSPITALIST

## 2019-09-25 PROCEDURE — 700102 HCHG RX REV CODE 250 W/ 637 OVERRIDE(OP): Performed by: HOSPITALIST

## 2019-09-25 PROCEDURE — 304155 HCHG HELIOX Q2H

## 2019-09-25 PROCEDURE — 99291 CRITICAL CARE FIRST HOUR: CPT | Performed by: INTERNAL MEDICINE

## 2019-09-25 PROCEDURE — 700111 HCHG RX REV CODE 636 W/ 250 OVERRIDE (IP): Performed by: EMERGENCY MEDICINE

## 2019-09-25 RX ORDER — CHOLECALCIFEROL (VITAMIN D3) 125 MCG
10 CAPSULE ORAL DAILY
Status: DISCONTINUED | OUTPATIENT
Start: 2019-09-25 | End: 2019-09-25

## 2019-09-25 RX ORDER — GABAPENTIN 300 MG/1
300 CAPSULE ORAL 4 TIMES DAILY
Status: DISCONTINUED | OUTPATIENT
Start: 2019-09-25 | End: 2019-10-01 | Stop reason: HOSPADM

## 2019-09-25 RX ORDER — TRAZODONE HYDROCHLORIDE 100 MG/1
100 TABLET ORAL EVERY EVENING
Status: ON HOLD | COMMUNITY
End: 2019-12-31 | Stop reason: SDUPTHER

## 2019-09-25 RX ORDER — IPRATROPIUM BROMIDE AND ALBUTEROL SULFATE 2.5; .5 MG/3ML; MG/3ML
3 SOLUTION RESPIRATORY (INHALATION)
Status: DISCONTINUED | OUTPATIENT
Start: 2019-09-25 | End: 2019-09-27

## 2019-09-25 RX ORDER — PREDNISONE 20 MG/1
20 TABLET ORAL DAILY
Status: DISCONTINUED | OUTPATIENT
Start: 2019-09-25 | End: 2019-10-01 | Stop reason: HOSPADM

## 2019-09-25 RX ORDER — TRAZODONE HYDROCHLORIDE 50 MG/1
100 TABLET ORAL
Status: DISCONTINUED | OUTPATIENT
Start: 2019-09-25 | End: 2019-10-01 | Stop reason: HOSPADM

## 2019-09-25 RX ORDER — CEPHALEXIN 500 MG/1
500 CAPSULE ORAL 3 TIMES DAILY
Status: ON HOLD | COMMUNITY
End: 2019-10-01

## 2019-09-25 RX ORDER — FOLIC ACID 1 MG/1
1 TABLET ORAL DAILY
Status: DISCONTINUED | OUTPATIENT
Start: 2019-09-26 | End: 2019-10-01 | Stop reason: HOSPADM

## 2019-09-25 RX ORDER — RANITIDINE 300 MG/1
300 TABLET ORAL EVERY MORNING
COMMUNITY
End: 2020-02-24

## 2019-09-25 RX ORDER — SODIUM BICARBONATE 650 MG/1
650 TABLET ORAL 2 TIMES DAILY
Status: DISCONTINUED | OUTPATIENT
Start: 2019-09-25 | End: 2019-10-01 | Stop reason: HOSPADM

## 2019-09-25 RX ORDER — AMOXICILLIN 250 MG
2 CAPSULE ORAL 2 TIMES DAILY
Status: DISCONTINUED | OUTPATIENT
Start: 2019-09-25 | End: 2019-10-01 | Stop reason: HOSPADM

## 2019-09-25 RX ORDER — TIOTROPIUM BROMIDE 18 UG/1
1 CAPSULE ORAL; RESPIRATORY (INHALATION) DAILY
Status: DISCONTINUED | OUTPATIENT
Start: 2019-09-25 | End: 2019-10-01 | Stop reason: HOSPADM

## 2019-09-25 RX ORDER — FUROSEMIDE 40 MG/1
80 TABLET ORAL DAILY
Status: DISCONTINUED | OUTPATIENT
Start: 2019-09-26 | End: 2019-10-01 | Stop reason: HOSPADM

## 2019-09-25 RX ORDER — MYCOPHENOLATE MOFETIL 250 MG/1
1000 CAPSULE ORAL 2 TIMES DAILY
Status: DISCONTINUED | OUTPATIENT
Start: 2019-09-25 | End: 2019-10-01 | Stop reason: HOSPADM

## 2019-09-25 RX ORDER — IPRATROPIUM BROMIDE AND ALBUTEROL SULFATE 2.5; .5 MG/3ML; MG/3ML
SOLUTION RESPIRATORY (INHALATION)
Status: COMPLETED
Start: 2019-09-25 | End: 2019-09-25

## 2019-09-25 RX ORDER — FAMOTIDINE 20 MG/1
20 TABLET, FILM COATED ORAL 2 TIMES DAILY
Status: DISCONTINUED | OUTPATIENT
Start: 2019-09-25 | End: 2019-10-01 | Stop reason: HOSPADM

## 2019-09-25 RX ORDER — PREDNISONE 10 MG/1
10 TABLET ORAL EVERY MORNING
COMMUNITY
End: 2019-12-09

## 2019-09-25 RX ORDER — BISACODYL 10 MG
10 SUPPOSITORY, RECTAL RECTAL
Status: DISCONTINUED | OUTPATIENT
Start: 2019-09-25 | End: 2019-10-01 | Stop reason: HOSPADM

## 2019-09-25 RX ORDER — METHOCARBAMOL 750 MG/1
750 TABLET, FILM COATED ORAL 3 TIMES DAILY
Status: DISCONTINUED | OUTPATIENT
Start: 2019-09-25 | End: 2019-10-01 | Stop reason: HOSPADM

## 2019-09-25 RX ORDER — ACETAMINOPHEN 325 MG/1
650 TABLET ORAL EVERY 6 HOURS PRN
Status: DISCONTINUED | OUTPATIENT
Start: 2019-09-25 | End: 2019-10-01 | Stop reason: HOSPADM

## 2019-09-25 RX ORDER — TRAMADOL HYDROCHLORIDE 50 MG/1
50 TABLET ORAL EVERY 6 HOURS PRN
Status: DISCONTINUED | OUTPATIENT
Start: 2019-09-25 | End: 2019-10-01 | Stop reason: HOSPADM

## 2019-09-25 RX ORDER — MONTELUKAST SODIUM 10 MG/1
10 TABLET ORAL DAILY
Status: DISCONTINUED | OUTPATIENT
Start: 2019-09-25 | End: 2019-10-01 | Stop reason: HOSPADM

## 2019-09-25 RX ORDER — POLYETHYLENE GLYCOL 3350 17 G/17G
1 POWDER, FOR SOLUTION ORAL
Status: DISCONTINUED | OUTPATIENT
Start: 2019-09-25 | End: 2019-10-01 | Stop reason: HOSPADM

## 2019-09-25 RX ORDER — BUDESONIDE AND FORMOTEROL FUMARATE DIHYDRATE 160; 4.5 UG/1; UG/1
2 AEROSOL RESPIRATORY (INHALATION) 2 TIMES DAILY
Status: DISCONTINUED | OUTPATIENT
Start: 2019-09-25 | End: 2019-10-01 | Stop reason: HOSPADM

## 2019-09-25 RX ORDER — FLUOXETINE HYDROCHLORIDE 20 MG/1
40 CAPSULE ORAL DAILY
Status: DISCONTINUED | OUTPATIENT
Start: 2019-09-26 | End: 2019-10-01 | Stop reason: HOSPADM

## 2019-09-25 RX ORDER — RANITIDINE 300 MG/1
300 TABLET ORAL DAILY
Status: DISCONTINUED | OUTPATIENT
Start: 2019-09-25 | End: 2019-09-25

## 2019-09-25 RX ORDER — ONDANSETRON 2 MG/ML
4 INJECTION INTRAMUSCULAR; INTRAVENOUS EVERY 4 HOURS PRN
Status: DISCONTINUED | OUTPATIENT
Start: 2019-09-25 | End: 2019-10-01 | Stop reason: HOSPADM

## 2019-09-25 RX ORDER — ZIPRASIDONE HYDROCHLORIDE 40 MG/1
80 CAPSULE ORAL 2 TIMES DAILY
Status: DISCONTINUED | OUTPATIENT
Start: 2019-09-25 | End: 2019-10-01 | Stop reason: HOSPADM

## 2019-09-25 RX ORDER — PREGABALIN 150 MG/1
300 CAPSULE ORAL 2 TIMES DAILY
Status: DISCONTINUED | OUTPATIENT
Start: 2019-09-25 | End: 2019-10-01 | Stop reason: HOSPADM

## 2019-09-25 RX ORDER — LEVOTHYROXINE SODIUM 0.07 MG/1
75 TABLET ORAL
Status: DISCONTINUED | OUTPATIENT
Start: 2019-09-26 | End: 2019-10-01 | Stop reason: HOSPADM

## 2019-09-25 RX ORDER — BUSPIRONE HYDROCHLORIDE 10 MG/1
10 TABLET ORAL 2 TIMES DAILY
Status: DISCONTINUED | OUTPATIENT
Start: 2019-09-25 | End: 2019-10-01 | Stop reason: HOSPADM

## 2019-09-25 RX ORDER — LACTULOSE 20 G/30ML
15 SOLUTION ORAL 2 TIMES DAILY PRN
COMMUNITY
End: 2019-11-06

## 2019-09-25 RX ORDER — LORAZEPAM 2 MG/ML
1 INJECTION INTRAMUSCULAR ONCE
Status: COMPLETED | OUTPATIENT
Start: 2019-09-25 | End: 2019-09-25

## 2019-09-25 RX ADMIN — PREDNISONE 20 MG: 20 TABLET ORAL at 14:35

## 2019-09-25 RX ADMIN — ZIPRASIDONE HYDROCHLORIDE 80 MG: 80 CAPSULE ORAL at 17:51

## 2019-09-25 RX ADMIN — METHOCARBAMOL TABLETS 750 MG: 750 TABLET, COATED ORAL at 15:28

## 2019-09-25 RX ADMIN — BUDESONIDE AND FORMOTEROL FUMARATE DIHYDRATE 2 PUFF: 160; 4.5 AEROSOL RESPIRATORY (INHALATION) at 17:50

## 2019-09-25 RX ADMIN — IPRATROPIUM BROMIDE AND ALBUTEROL SULFATE 3 ML: .5; 3 SOLUTION RESPIRATORY (INHALATION) at 12:35

## 2019-09-25 RX ADMIN — IPRATROPIUM BROMIDE AND ALBUTEROL SULFATE 3 ML: .5; 3 SOLUTION RESPIRATORY (INHALATION) at 14:44

## 2019-09-25 RX ADMIN — BUSPIRONE HYDROCHLORIDE 10 MG: 10 TABLET ORAL at 17:51

## 2019-09-25 RX ADMIN — INSULIN HUMAN 2 UNITS: 100 INJECTION, SOLUTION PARENTERAL at 18:01

## 2019-09-25 RX ADMIN — SODIUM BICARBONATE 650 MG: 650 TABLET ORAL at 17:52

## 2019-09-25 RX ADMIN — SENNOSIDES, DOCUSATE SODIUM 2 TABLET: 50; 8.6 TABLET, FILM COATED ORAL at 17:56

## 2019-09-25 RX ADMIN — PREGABALIN 300 MG: 150 CAPSULE ORAL at 20:28

## 2019-09-25 RX ADMIN — TRAZODONE HYDROCHLORIDE 100 MG: 50 TABLET ORAL at 20:58

## 2019-09-25 RX ADMIN — METHOCARBAMOL TABLETS 750 MG: 750 TABLET, COATED ORAL at 20:58

## 2019-09-25 RX ADMIN — LORAZEPAM 1 MG: 2 INJECTION INTRAMUSCULAR; INTRAVENOUS at 09:55

## 2019-09-25 RX ADMIN — ONDANSETRON 4 MG: 2 INJECTION INTRAMUSCULAR; INTRAVENOUS at 16:19

## 2019-09-25 RX ADMIN — MYCOPHENOLATE MOFETIL 1000 MG: 250 CAPSULE ORAL at 17:52

## 2019-09-25 RX ADMIN — GABAPENTIN 300 MG: 300 CAPSULE ORAL at 17:56

## 2019-09-25 RX ADMIN — TIOTROPIUM BROMIDE 1 CAPSULE: 18 CAPSULE ORAL; RESPIRATORY (INHALATION) at 16:25

## 2019-09-25 RX ADMIN — TRAMADOL HYDROCHLORIDE 50 MG: 50 TABLET ORAL at 20:58

## 2019-09-25 RX ADMIN — IPRATROPIUM BROMIDE AND ALBUTEROL SULFATE 3 ML: .5; 3 SOLUTION RESPIRATORY (INHALATION) at 09:11

## 2019-09-25 RX ADMIN — MONTELUKAST 10 MG: 10 TABLET, FILM COATED ORAL at 16:19

## 2019-09-25 RX ADMIN — IPRATROPIUM BROMIDE AND ALBUTEROL SULFATE 3 ML: .5; 3 SOLUTION RESPIRATORY (INHALATION) at 19:20

## 2019-09-25 RX ADMIN — TRAMADOL HYDROCHLORIDE 50 MG: 50 TABLET ORAL at 14:35

## 2019-09-25 RX ADMIN — ENOXAPARIN SODIUM 40 MG: 100 INJECTION SUBCUTANEOUS at 16:19

## 2019-09-25 RX ADMIN — GABAPENTIN 300 MG: 300 CAPSULE ORAL at 14:35

## 2019-09-25 RX ADMIN — FAMOTIDINE 20 MG: 20 TABLET ORAL at 17:56

## 2019-09-25 RX ADMIN — GABAPENTIN 300 MG: 300 CAPSULE ORAL at 20:58

## 2019-09-25 ASSESSMENT — ENCOUNTER SYMPTOMS
DEPRESSION: 0
CHILLS: 0
NECK PAIN: 1
BACK PAIN: 1
HEADACHES: 0
DOUBLE VISION: 0
COUGH: 1
SPUTUM PRODUCTION: 0
EYE REDNESS: 0
SORE THROAT: 0
LOSS OF CONSCIOUSNESS: 0
FOCAL WEAKNESS: 0
DIZZINESS: 0
PALPITATIONS: 0
VOMITING: 0
DIARRHEA: 0
SHORTNESS OF BREATH: 1
NAUSEA: 0
EYE DISCHARGE: 0
FEVER: 0
ABDOMINAL PAIN: 0
BLURRED VISION: 0
WHEEZING: 1

## 2019-09-25 ASSESSMENT — LIFESTYLE VARIABLES
TOTAL SCORE: 0
ALCOHOL_USE: NO
EVER FELT BAD OR GUILTY ABOUT YOUR DRINKING: NO
EVER_SMOKED: NEVER
EVER HAD A DRINK FIRST THING IN THE MORNING TO STEADY YOUR NERVES TO GET RID OF A HANGOVER: NO
TOTAL SCORE: 0
CONSUMPTION TOTAL: INCOMPLETE
HAVE YOU EVER FELT YOU SHOULD CUT DOWN ON YOUR DRINKING: NO
TOTAL SCORE: 0
HAVE PEOPLE ANNOYED YOU BY CRITICIZING YOUR DRINKING: NO
EVER_SMOKED: NEVER

## 2019-09-25 ASSESSMENT — COPD QUESTIONNAIRES
COPD SCREENING SCORE: 1
DO YOU EVER COUGH UP ANY MUCUS OR PHLEGM?: NO/ONLY WITH OCCASIONAL COLDS OR INFECTIONS
HAVE YOU SMOKED AT LEAST 100 CIGARETTES IN YOUR ENTIRE LIFE: NO/DON'T KNOW
DURING THE PAST 4 WEEKS HOW MUCH DID YOU FEEL SHORT OF BREATH: SOME OF THE TIME

## 2019-09-25 NOTE — ASSESSMENT & PLAN NOTE
This is been waxing and waning  FEES, concerning for vocal cord dysfunction, her ENT doctor is Dr Oconnor, F/U outpt

## 2019-09-25 NOTE — ED NOTES
Assumed care of pt from prior RN Ernesto. Pt remains on monitor, in no acute distress, toleration Heliox well, denies needs at this time.

## 2019-09-25 NOTE — CONSULTS
Critical Care Consultation    Date of consult: 9/25/2019    Referring Physician  Dr. Shen    Reason for Consultation  Acute on chronic respiratory failure    History of Presenting Illness  28 y.o. Female with asthma, optic neuritis on immunosuppressive therapy with CellCept and steroid taper, psychiatric disorder(anxiety and possible schizophrenia), morbid obesity, hypothyroidism, suspected metabolic disorder, significant debility, mild TONYA with AHI of 13, chronic pain, exertional hypoxia, who presented 9/25/2019 with S OB that started this a.m.  Indicates she had exposure to cigarette smoke and subsequently developed wheezing and shortness of breath.  Denies any current fever, chills, sweats, productive cough, CP, nausea, vomiting, or lower extremity edema.  She attempted to utilize her nebulizers without significant benefit and proceeded to come to the ED.    ED course  Labs with mild AGMA, no leukocytosis or left shift, patient was given DuoNeb's x2 and 1 mg Ativan followed by initiation of heliox.  Imaging did not show obvious abnormality.      Code Status  Full Code    Review of Systems  Review of Systems   Constitutional: Negative for chills, fever and malaise/fatigue.   HENT: Negative for congestion.    Eyes: Negative for discharge and redness.   Respiratory: Positive for cough, shortness of breath and wheezing. Negative for sputum production.    Cardiovascular: Negative for chest pain and palpitations.   Gastrointestinal: Negative for abdominal pain, nausea and vomiting.   Neurological: Negative for focal weakness.   Psychiatric/Behavioral: Negative for depression.   All other systems reviewed and are negative.      Past Medical History   has a past medical history of Abdominal pain, Anginal syndrome, Apnea, sleep, Arrhythmia, Arthritis, ASTHMA, Atrial fibrillation (HCC), Back pain, Borderline personality disorder (HCC), Breath shortness, Bronchitis, Cardiac arrhythmia, Chickenpox, Chronic UTI (9/18/2010),  Cough, Daytime sleepiness, Depression, Diabetes (HCC), Diarrhea, Disorder of thyroid, Fall, Fatigue, Frequent headaches, Gasping for breath, Gynecological disorder, Headache(784.0), Heart burn, History of falling, Hypertension, Indigestion, Migraine, 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HCC), 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[facility]), Nausea, Obesity, Pain (08-15-12), Painful joint, PCOS (polycystic ovarian syndrome), Pneumonia (2012), Psychosis ([facility]), Renal disorder, Ringing in ears, Scoliosis, Shortness of breath, Sinus tachycardia (10/31/2013), Sleep apnea, Snoring, Tonsillitis, Tuberculosis, Urinary bladder disorder, Urinary incontinence, Weakness, and Wears glasses.    Surgical History   has a past surgical history that includes neuro dest facet l/s w/ig sngl (4/21/2015); recovery (1/27/2016); [place] by laparoscopy (8/29/2010); lumbar fusion anterior (8/21/2012); other cardiac surgery (1/2016); tonsillectomy; bowel stimulator insertion (7/13/2016); gastroscopy with balloon dilatation (N/A, 1/4/2017); muscle biopsy (Right, 1/26/2017); other abdominal surgery; and laminotomy.    Family History  family history includes Genitourinary () Problems in her sister; Heart Disease in her maternal grandmother and mother; Hypertension in her maternal grandmother, maternal uncle, and mother; No Known Problems in her sister; Other in her mother and sister; Sleep Apnea in her mother.    Social History   reports that she has never smoked. She has never used smokeless tobacco. She reports that she has current or past drug history. Drugs: Marijuana and Oral. Frequency: 7.00 times per week. She reports that she does not drink alcohol.    Medications  Home Medications     Reviewed by Ulisses Noyola (Pharmacy Tech) on 09/25/19 at 1240  Med List Status: Complete   Medication Last Dose Status   albuterol 108 (90 Base) MCG/ACT Aero Soln inhalation aerosol 9/25/2019 Active   aspirin EC (ECOTRIN) 81 MG Tablet Delayed Response 9/25/2019 Active    budesonide-formoterol (SYMBICORT) 160-4.5 MCG/ACT Aerosol 9/24/2019 Active   busPIRone (BUSPAR) 10 MG Tab tablet 9/25/2019 Active   cephALEXin (KEFLEX) 500 MG Cap 9/25/2019 Active   Diclofenac Sodium (VOLTAREN) 1 % Gel 9/24/2019 Active   diphenhydrAMINE-APAP, sleep, (TYLENOL PM EXTRA STRENGTH)  MG Tab 9/24/2019 Active   Dulaglutide (TRULICITY) 0.75 MG/0.5ML Solution Pen-injector 9/20/2019 Active   etonogestrel (NEXPLANON) 68 MG Implant implant unknown Active   fluoxetine (PROZAC) 40 MG capsule 9/25/2019 Active   Folic Acid 0.8 MG Cap 9/25/2019 Active   furosemide (LASIX) 80 MG Tab 9/25/2019 Active   gabapentin (NEURONTIN) 300 MG Cap 9/25/2019 Active   ipratropium-albuterol (DUONEB) 0.5-2.5 (3) MG/3ML nebulizer solution 9/25/2019 Active   Ivabradine HCl (CORLANOR) 7.5 MG Tab 9/25/2019 Active   lactulose 20 GM/30ML Solution 9/24/2019 Active   levothyroxine (SYNTHROID) 75 MCG Tab 9/25/2019 Active   LYRICA 300 MG capsule 9/25/2019 Active   Melatonin 5 MG Tab 9/24/2019 Active   methocarbamol (ROBAXIN) 750 MG Tab 9/25/2019 Active   montelukast (SINGULAIR) 10 MG Tab 9/24/2019 Active   mycophenolate (CELLCEPT) 500 MG tablet 9/25/2019 Active   ondansetron (ZOFRAN) 4 MG Tab tablet 9/24/2019 Active   predniSONE (DELTASONE) 10 MG Tab 9/25/2019 Active   ranitidine (ZANTAC) 300 MG tablet 9/25/2019 Active   sodium bicarbonate 325 MG Tab 9/25/2019 Active   tiotropium (SPIRIVA) 18 MCG Cap 9/24/2019 Active   traZODone (DESYREL) 100 MG Tab 9/24/2019 Active   ziprasidone (GEODON) 80 MG Cap 9/25/2019 Active              Current Facility-Administered Medications   Medication Dose Route Frequency Provider Last Rate Last Dose   • ziprasidone (GEODON) capsule 80 mg  80 mg Oral BID Ruben Renee D.O.       • traZODone (DESYREL) tablet 100 mg  100 mg Oral QHS Ruben Renee D.O.       • tiotropium (SPIRIVA) 18 MCG inhalation capsule 1 Cap  1 Cap Inhalation DAILY Ruben Renee D.O.       • sodium  bicarbonate tablet 650 mg  650 mg Oral BID Ruben Renee D.O.       • mycophenolate (CELLCEPT) capsule 1,000 mg  1,000 mg Oral BID Ruben Renee D.O.       • montelukast (SINGULAIR) tablet 10 mg  10 mg Oral DAILY Ruben Renee D.O.       • methocarbamol (ROBAXIN) tablet 750 mg  750 mg Oral TID Ruben Renee D.O.       • pregabalin (LYRICA) capsule 300 mg  300 mg Oral BID Ruben Renee D.O.       • [START ON 9/26/2019] levothyroxine (SYNTHROID) tablet 75 mcg  75 mcg Oral AM ES Ruben Renee D.O.       • Ivabradine HCl TABS 7.5 mg  7.5 mg Oral BID Ruben Renee D.O.       • gabapentin (NEURONTIN) capsule 300 mg  300 mg Oral 4X/DAY Ruben Renee D.O.       • [START ON 9/26/2019] furosemide (LASIX) tablet 80 mg  80 mg Oral DAILY Ruben Renee D.O.       • [START ON 9/26/2019] folic acid (FOLVITE) tablet 1 mg  1 mg Oral DAILY Ruben Renee D.O.       • [START ON 9/26/2019] FLUoxetine (PROZAC) capsule 40 mg  40 mg Oral DAILY Ruben Renee D.O.       • [START ON 9/27/2019] Dulaglutide SOPN 0.75 mg  0.75 mg Subcutaneous Q FRIDAY Ruben Renee D.O.       • diphenhydrAMINE-APAP (sleep)  MG TABS 1 Cap  1 Cap Oral Q EVENING Ruben Renee D.O.       • busPIRone (BUSPAR) tablet 10 mg  10 mg Oral BID Ruben Renee D.O.       • budesonide-formoterol (SYMBICORT) 160-4.5 MCG/ACT inhaler 2 Puff  2 Puff Inhalation BID Ruben Renee D.O.       • [START ON 9/26/2019] aspirin EC (ECOTRIN) tablet 81 mg  81 mg Oral DAILY Ruben Renee D.O.       • senna-docusate (PERICOLACE or SENOKOT S) 8.6-50 MG per tablet 2 Tab  2 Tab Oral BID Ruben Renee D.O.        And   • polyethylene glycol/lytes (MIRALAX) PACKET 1 Packet  1 Packet Oral QDAY PRN Ruben Renee D.O.        And   • magnesium hydroxide (MILK OF MAGNESIA) suspension 30 mL  30 mL Oral QDAY PRN Ruben  "OLMAN Renee        And   • bisacodyl (DULCOLAX) suppository 10 mg  10 mg Rectal QDAY PRN Ruben Renee D.O.       • Respiratory Care per Protocol   Nebulization Continuous RT Ruben Renee D.O.       • enoxaparin (LOVENOX) inj 40 mg  40 mg Subcutaneous DAILY Ruben Renee D.O.       • acetaminophen (TYLENOL) tablet 650 mg  650 mg Oral Q6HRS PRN Ruben Renee D.O.       • tramadol (ULTRAM) 50 MG tablet 50 mg  50 mg Oral Q6HRS PRN Ruben Renee D.O.       • predniSONE (DELTASONE) tablet 20 mg  20 mg Oral DAILY Ruben Renee D.O.       • ipratropium-albuterol (DUONEB) nebulizer solution  3 mL Nebulization 4X/DAY (RT) Ruben Renee D.O.   3 mL at 09/25/19 1235   • famotidine (PEPCID) tablet 20 mg  20 mg Oral BID Ruben Renee D.O.           Allergies  Allergies   Allergen Reactions   • Cefdinir Shortness of Breath and Itching     Tolerated 1/18/17  Tolerates ceftriaxone    • Depakote [Divalproex Sodium] Unspecified     Muscle spasms/muscle pain and weakness     • Doxycycline Anaphylaxis and Vomiting     RXN=unknown   • Amitriptyline Unspecified     Headaches     • Aripiprazole [Abilify] Unspecified     Headaches/muscle twitching     • Ciprofloxacin Rash     Pt states \"I get a rash\".     • Clindamycin Nausea     Even with food     • Ees [Erythromycin] Vomiting and Nausea   • Flagyl [Metronidazole Hcl] Unspecified     \"eye problems\"     • Flomax [Tamsulosin Hydrochloride] Swelling   • Metformin Unspecified     Increased lactic acid      • Tape Rash     Tears skin off  coban with Tegaderm tape ok intermittently  RXN=ongoing   • Vancomycin Itching     Pt becomes flushed in face and chest.   RXN=7/10/16   • Wound Dressing Adhesive Hives     By pt report   • Cephalexin [Keflex] Rash     Pt states she gets a rash with this medication  Tolerates ceftriaxone   • Divalproex Sodium [Valproic Acid] Rash     .   • Levofloxacin Unspecified     " Leg muscle cramps   • Metronidazole Rash     .   • Tamsulosin Hcl        Vital Signs last 24 hours  Temp:  [36.2 °C (97.2 °F)-36.4 °C (97.5 °F)] 36.2 °C (97.2 °F)  Pulse:  [] 125  Resp:  [20-26] 20  BP: (133-163)/() 158/100  SpO2:  [92 %-98 %] 96 %    Physical Exam  Physical Exam   Constitutional:   Morbidly obese and chronically ill-appearing   HENT:   Head: Normocephalic and atraumatic.   Eyes: Pupils are equal, round, and reactive to light. Conjunctivae are normal.   Neck: No JVD present.   Large neck girth, upper airway wheeze   Cardiovascular:   No murmur heard.  Tachycardic   Pulmonary/Chest: She is in respiratory distress. She has no rales.   Lungs clear to auscultation with good air movement bilaterally, wheeze heart in upper airway and ?  Provoked   Abdominal: Soft. She exhibits no distension. There is no tenderness.   Musculoskeletal: She exhibits edema (Trace).   Lymphadenopathy:     She has no cervical adenopathy.   Neurological: No cranial nerve deficit.   Skin: Skin is warm and dry. She is not diaphoretic.   Psychiatric: She has a normal mood and affect.   Nursing note and vitals reviewed.      Fluids  No intake or output data in the 24 hours ending 09/25/19 1355    Laboratory  Recent Results (from the past 48 hour(s))   VITAMIN D,25 HYDROXY    Collection Time: 09/25/19  6:12 AM   Result Value Ref Range    25-Hydroxy   Vitamin D 25 20 (L) 30 - 100 ng/mL   URINALYSIS    Collection Time: 09/25/19  6:12 AM   Result Value Ref Range    Micro Urine Req Microscopic    CBC WITH DIFFERENTIAL    Collection Time: 09/25/19  6:12 AM   Result Value Ref Range    WBC 7.7 4.8 - 10.8 K/uL    RBC 4.32 4.20 - 5.40 M/uL    Hemoglobin 14.0 12.0 - 16.0 g/dL    Hematocrit 43.8 37.0 - 47.0 %    .4 (H) 81.4 - 97.8 fL    MCH 32.4 27.0 - 33.0 pg    MCHC 32.0 (L) 33.6 - 35.0 g/dL    RDW 48.4 35.9 - 50.0 fL    Platelet Count 189 164 - 446 K/uL    MPV 11.3 9.0 - 12.9 fL    Neutrophils-Polys 48.20 44.00 - 72.00 %     Lymphocytes 42.00 (H) 22.00 - 41.00 %    Monocytes 5.80 0.00 - 13.40 %    Eosinophils 2.70 0.00 - 6.90 %    Basophils 0.40 0.00 - 1.80 %    Immature Granulocytes 0.90 0.00 - 0.90 %    Nucleated RBC 0.00 /100 WBC    Neutrophils (Absolute) 3.71 2.00 - 7.15 K/uL    Lymphs (Absolute) 3.24 1.00 - 4.80 K/uL    Monos (Absolute) 0.45 0.00 - 0.85 K/uL    Eos (Absolute) 0.21 0.00 - 0.51 K/uL    Baso (Absolute) 0.03 0.00 - 0.12 K/uL    Immature Granulocytes (abs) 0.07 0.00 - 0.11 K/uL    NRBC (Absolute) 0.00 K/uL   Blood Culture,Hold    Collection Time: 09/25/19  9:10 AM   Result Value Ref Range    Blood Culture Hold Collected    CBC WITH DIFFERENTIAL    Collection Time: 09/25/19  9:10 AM   Result Value Ref Range    WBC 8.7 4.8 - 10.8 K/uL    RBC 4.36 4.20 - 5.40 M/uL    Hemoglobin 13.8 12.0 - 16.0 g/dL    Hematocrit 42.8 37.0 - 47.0 %    MCV 98.2 (H) 81.4 - 97.8 fL    MCH 31.7 27.0 - 33.0 pg    MCHC 32.2 (L) 33.6 - 35.0 g/dL    RDW 46.5 35.9 - 50.0 fL    Platelet Count 181 164 - 446 K/uL    MPV 11.3 9.0 - 12.9 fL    Neutrophils-Polys 58.30 44.00 - 72.00 %    Lymphocytes 33.20 22.00 - 41.00 %    Monocytes 5.20 0.00 - 13.40 %    Eosinophils 2.30 0.00 - 6.90 %    Basophils 0.30 0.00 - 1.80 %    Immature Granulocytes 0.70 0.00 - 0.90 %    Nucleated RBC 0.00 /100 WBC    Neutrophils (Absolute) 5.08 2.00 - 7.15 K/uL    Lymphs (Absolute) 2.89 1.00 - 4.80 K/uL    Monos (Absolute) 0.45 0.00 - 0.85 K/uL    Eos (Absolute) 0.20 0.00 - 0.51 K/uL    Baso (Absolute) 0.03 0.00 - 0.12 K/uL    Immature Granulocytes (abs) 0.06 0.00 - 0.11 K/uL    NRBC (Absolute) 0.00 K/uL   CMP    Collection Time: 09/25/19  9:10 AM   Result Value Ref Range    Sodium 140 135 - 145 mmol/L    Potassium 4.1 3.6 - 5.5 mmol/L    Chloride 110 96 - 112 mmol/L    Co2 16 (L) 20 - 33 mmol/L    Anion Gap 14.0 (H) 0.0 - 11.9    Glucose 140 (H) 65 - 99 mg/dL    Bun 11 8 - 22 mg/dL    Creatinine 0.80 0.50 - 1.40 mg/dL    Calcium 9.7 8.5 - 10.5 mg/dL    AST(SGOT) 29 12 - 45  U/L    ALT(SGPT) 43 2 - 50 U/L    Alkaline Phosphatase 32 30 - 99 U/L    Total Bilirubin 0.4 0.1 - 1.5 mg/dL    Albumin 4.9 3.2 - 4.9 g/dL    Total Protein 6.7 6.0 - 8.2 g/dL    Globulin 1.8 (L) 1.9 - 3.5 g/dL    A-G Ratio 2.7 g/dL   ESTIMATED GFR    Collection Time: 09/25/19  9:10 AM   Result Value Ref Range    GFR If African American >60 >60 mL/min/1.73 m 2    GFR If Non African American >60 >60 mL/min/1.73 m 2   ACCU-CHEK GLUCOSE    Collection Time: 09/25/19  1:30 PM   Result Value Ref Range    Glucose - Accu-Ck 131 (H) 65 - 99 mg/dL       Imaging  DX-NECK FOR SOFT TISSUE   Final Result    Examination limited by patient body habitus.   No prevertebral/retropharyngeal edema is identified.         DX-CHEST-PORTABLE (1 VIEW)   Final Result      No acute cardiopulmonary process is seen.          Assessment/Plan  * Chronic respiratory failure with hypoxia, on home oxygen therapy (HCC)- (present on admission)  Assessment & Plan  Cta 7/2019 without acute findings, IGE wnl, current exam with upper airway findings (exacerbated by stimulation)   RT/O2 protocols  Scheduled and PRN nebulizers  Initiate HF NC followed by NIPPV if ineffective  Stop heliox  FEES to evaluate for possible vocal cord function  If evidence of airway compromise-low threshold for intubation for airway protection  No current evidence of angioedema or epiglottitis/infectious process    Controlled type 2 diabetes mellitus without complication, without long-term current use of insulin (HCC)- (present on admission)  Assessment & Plan  A1c 6  SSI and FS BS    Optic neuritis  Assessment & Plan  On CellCept and prednisone taper    TONYA (obstructive sleep apnea)- (present on admission)  Assessment & Plan  Mild per most recent sleep study  Minimize narcotic and sedating medications    Acquired hypothyroidism- (present on admission)  Assessment & Plan  Continue with replacement therapy, TSH less than month ago WNL    GERD (gastroesophageal reflux disease)-  (present on admission)  Assessment & Plan  Keep head of bed upright  Pepcid    Morbidly obese (HCC)- (present on admission)  Assessment & Plan  Dietary consultation and weight loss suggested  On chronic steroid therapy for optic neuritis    Anxiety- (present on admission)  Assessment & Plan  Continue home regimen      Discussed patient condition and risk of morbidity and/or mortality with Hospitalist, RN, RT, Pharmacy and Patient.    The patient remains critically ill.  Critical care time = 45 minutes in directly providing and coordinating critical care and extensive data review.  No time overlap and excludes procedures.

## 2019-09-25 NOTE — THERAPY
"Order received for a FEES due to \"Pt with stridor ?vocal cord dysfunction.\"  A Fiberoptic Endoscope Evaluation of Swallow is a diagnostic assessment to evaluate swallowing function in patients with suspected dysphagia.  Please consult ENT to assess the vocal cords if concerned about dysfunction.  Should the patient have difficulty swallowing, a referral for speech services would be appropriate.  Left message with RN in the ER.    "

## 2019-09-25 NOTE — ED NOTES
"Patient states that the helium tank is making a \"thunk sound\". Rn in room when tank made sound. RT called to assess the tank.   "

## 2019-09-25 NOTE — ASSESSMENT & PLAN NOTE
- Asthma an unlikely explanation for her chronic O2 dependence. PFTs from earlier this month showing borderline restrictive pattern, no BD response, and borderline low DLCO.  - In setting of normal lung parenchyma seen on recent CT, this PFT pattern is suggestive of extrathoracic restriction/hypoventilation likely due to her body habitus/pickwickian syndrome, cannot rule out a contributing neuromuscular disease. Can consider MIPS/MEPS as outpatient.  - Furthermore, the suspected underlying metabolic disorder may also be contributing to her chronic hypoxic respiratory failure due to increased tissue O2 extraction    Plan:  - Currently she is on her baseline O2 requirements, continue to titrate to maintain > 92%  - Cont incentive spirometry, mobilization, nebulizers, RT/O2 protocol  - Consider MIPS/MEPs as outpatient for aforementioned issues

## 2019-09-25 NOTE — H&P
Hospital Medicine History & Physical Note    Date of Service  9/25/2019    Primary Care Physician  Torres Brody M.D.    Consultants  Pulmonology    Code Status  Full    Chief Complaint  Wheezing    History of Presenting Illness  28 y.o. female who who has an extensive previous medical history including asthma.  She was recently started on immunosuppressive therapy for optic neuritis by Dr. Cabral.  She was also recently discharged from [facility] where she was treated with steroids for an asthma exacerbation.  She is currently on 10 mg of prednisone.  She presents with complaint of stridor and wheezing.  The symptoms began in the a.m.  She denies any fever or chills, she has no increase in her cough.    Review of Systems  Review of Systems   Constitutional: Negative for chills and fever.   HENT: Negative for nosebleeds and sore throat.    Eyes: Negative for blurred vision and double vision.   Respiratory: Positive for cough, shortness of breath and wheezing.    Cardiovascular: Negative for chest pain, palpitations and leg swelling.   Gastrointestinal: Negative for abdominal pain, diarrhea, nausea and vomiting.   Genitourinary: Negative for dysuria and urgency.   Musculoskeletal: Positive for back pain and neck pain.   Skin: Negative for rash.   Neurological: Negative for dizziness, loss of consciousness and headaches.       Past Medical History   has a past medical history of Abdominal pain, Anginal syndrome, Apnea, sleep, Arrhythmia, Arthritis, ASTHMA, Atrial fibrillation ([facility]), Back pain, Borderline personality disorder ([facility]), Breath shortness, Bronchitis, Cardiac arrhythmia, Chickenpox, Chronic UTI (9/18/2010), Cough, Daytime sleepiness, Depression, Diabetes ([facility]), Diarrhea, Disorder of thyroid, Fall, Fatigue, Frequent headaches, Gasping for breath, Gynecological disorder, Headache(784.0), Heart burn, History of falling, Hypertension, Indigestion, Migraine, 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[facility]), Multiple  "personality disorder (HCC), Nausea, Obesity, Pain (08-15-12), Painful joint, PCOS (polycystic ovarian syndrome), Pneumonia (2012), Psychosis (HCC), Renal disorder, Ringing in ears, Scoliosis, Shortness of breath, Sinus tachycardia (10/31/2013), Sleep apnea, Snoring, Tonsillitis, Tuberculosis, Urinary bladder disorder, Urinary incontinence, Weakness, and Wears glasses.    Surgical History   has a past surgical history that includes neuro dest facet l/s w/ig sngl (4/21/2015); recovery (1/27/2016); delmar by laparoscopy (8/29/2010); lumbar fusion anterior (8/21/2012); other cardiac surgery (1/2016); tonsillectomy; bowel stimulator insertion (7/13/2016); gastroscopy with balloon dilatation (N/A, 1/4/2017); muscle biopsy (Right, 1/26/2017); other abdominal surgery; and laminotomy.     Family History  family history includes Genitourinary () Problems in her sister; Heart Disease in her maternal grandmother and mother; Hypertension in her maternal grandmother, maternal uncle, and mother; No Known Problems in her sister; Other in her mother and sister; Sleep Apnea in her mother.     Social History   reports that she has never smoked. She has never used smokeless tobacco. She reports that she has current or past drug history. Drugs: Marijuana and Oral. Frequency: 7.00 times per week. She reports that she does not drink alcohol.    Allergies  Allergies   Allergen Reactions   • Cefdinir Shortness of Breath and Itching     Tolerated 1/18/17  Tolerates ceftriaxone    • Depakote [Divalproex Sodium] Unspecified     Muscle spasms/muscle pain and weakness     • Doxycycline Anaphylaxis and Vomiting     RXN=unknown   • Amitriptyline Unspecified     Headaches     • Aripiprazole [Abilify] Unspecified     Headaches/muscle twitching     • Ciprofloxacin Rash     Pt states \"I get a rash\".     • Clindamycin Nausea     Even with food     • Ees [Erythromycin] Vomiting and Nausea   • Flagyl [Metronidazole Hcl] Unspecified     \"eye problems\"   "   • Flomax [Tamsulosin Hydrochloride] Swelling   • Metformin Unspecified     Increased lactic acid      • Tape Rash     Tears skin off  coban with Tegaderm tape ok intermittently  RXN=ongoing   • Vancomycin Itching     Pt becomes flushed in face and chest.   RXN=7/10/16   • Wound Dressing Adhesive Hives     By pt report   • Cephalexin [Keflex] Rash     Pt states she gets a rash with this medication  Tolerates ceftriaxone   • Divalproex Sodium [Valproic Acid] Rash     .   • Levofloxacin Unspecified     Leg muscle cramps   • Metronidazole Rash     .   • Tamsulosin Hcl        Medications  Prior to Admission Medications   Prescriptions Last Dose Informant Patient Reported? Taking?   Diclofenac Sodium (VOLTAREN) 1 % Gel 9/24/2019 at Unknown time  Yes Yes   Sig: Apply 1 Application to skin as directed 4 times a day as needed (on wrists, back, ankles, and feet).   Dulaglutide (TRULICITY) 0.75 MG/0.5ML Solution Pen-injector 9/20/2019 at Unknown time Patient Yes No   Sig: Inject 1.5 mg as instructed every Friday.   Folic Acid 0.8 MG Cap 9/25/2019 at 0830 Patient Yes No   Sig: Take 0.8 mg by mouth every day.   Ivabradine HCl (CORLANOR) 7.5 MG Tab 9/25/2019 at 0100 Patient Yes No   Sig: Take 7.5 mg by mouth 2 Times a Day.   LYRICA 300 MG capsule 9/25/2019 at 0100 Patient Yes No   Sig: Take 300 mg by mouth 2 times a day.   Melatonin 5 MG Tab 9/24/2019 at 1100 Patient Yes No   Sig: Take 5 mg by mouth every morning.   albuterol 108 (90 Base) MCG/ACT Aero Soln inhalation aerosol 9/25/2019 at Unknown time Patient Yes No   Sig: Inhale 2 Puffs by mouth every 6 hours as needed for Shortness of Breath.   aspirin EC (ECOTRIN) 81 MG Tablet Delayed Response 9/25/2019 at 0800 Patient No No   Sig: Take 1 Tab by mouth every day.   budesonide-formoterol (SYMBICORT) 160-4.5 MCG/ACT Aerosol 9/24/2019 at Unknown time Patient No No   Sig: Inhale 2 Puffs by mouth 2 Times a Day.   busPIRone (BUSPAR) 10 MG Tab tablet 9/25/2019 at 0100  No No   Sig:  Take 1 Tab by mouth 2 times a day.   cephALEXin (KEFLEX) 500 MG Cap 9/25/2019 at 0830  Yes Yes   Sig: Take 500 mg by mouth 3 times a day. For 2 weeks   possisble start date: 9/17/19   diphenhydrAMINE-APAP, sleep, (TYLENOL PM EXTRA STRENGTH)  MG Tab 9/24/2019 at 1100  Yes Yes   Sig: Take 2 Tabs by mouth every morning.   etonogestrel (NEXPLANON) 68 MG Implant implant unknown at Unknown time Patient Yes No   Sig: Inject 1 Each as instructed Once.   fluoxetine (PROZAC) 40 MG capsule 9/25/2019 at 0100  No No   Sig: Take 1 Cap by mouth every day.   furosemide (LASIX) 80 MG Tab 9/25/2019 at 0100 Patient Yes No   Sig: Take 80 mg by mouth every day.   gabapentin (NEURONTIN) 300 MG Cap 9/25/2019 at 0100 Patient Yes No   Sig: Take 300 mg by mouth 4 times a day.   ipratropium-albuterol (DUONEB) 0.5-2.5 (3) MG/3ML nebulizer solution 9/25/2019 at 0830 Patient No No   Sig: 3 mL by Nebulization route every 6 hours as needed for Shortness of Breath.   lactulose 20 GM/30ML Solution 9/24/2019 at Unknown time  Yes Yes   Sig: Take 15 mL by mouth 2 times a day as needed. Indications: Chronic Constipation   levothyroxine (SYNTHROID) 75 MCG Tab 9/25/2019 at 0400 Patient No No   Sig: Take 1 Tab by mouth Every morning on an empty stomach.   methocarbamol (ROBAXIN) 750 MG Tab 9/25/2019 at 0100 Patient Yes No   Sig: Take 750 mg by mouth 3 times a day.   montelukast (SINGULAIR) 10 MG Tab 9/24/2019 at 2100  No No   Sig: Take 1 Tab by mouth every day.   mycophenolate (CELLCEPT) 500 MG tablet 9/25/2019 at 0400 Patient Yes No   Sig: Take 1,000 mg by mouth 2 times a day.   ondansetron (ZOFRAN) 4 MG Tab tablet 9/24/2019 at Unknown time Patient No No   Sig: Take 1 Tab by mouth every four hours as needed for Nausea/Vomiting.   predniSONE (DELTASONE) 10 MG Tab 9/25/2019 at 0830  Yes Yes   Sig: Take 10 mg by mouth every morning.   ranitidine (ZANTAC) 300 MG tablet 9/25/2019 at 0830  Yes Yes   Sig: Take 300 mg by mouth every morning.   sodium  bicarbonate 325 MG Tab 9/25/2019 at 0100 Patient Yes No   Sig: Take 650 mg by mouth 2 Times a Day.   tiotropium (SPIRIVA) 18 MCG Cap 9/24/2019 at 1200  No No   Sig: Inhale 1 Cap by mouth every day.   traZODone (DESYREL) 100 MG Tab 9/24/2019 at 1100  Yes Yes   Sig: Take 100 mg by mouth every morning.   ziprasidone (GEODON) 80 MG Cap 9/25/2019 at 0100  No No   Sig: Take 1 Cap by mouth 2 Times a Day.      Facility-Administered Medications: None       Physical Exam  Temp:  [36.2 °C (97.2 °F)-36.4 °C (97.5 °F)] 36.2 °C (97.2 °F)  Pulse:  [] 114  Resp:  [15-26] 20  BP: (133-163)/() 136/76  SpO2:  [92 %-98 %] 94 %    Physical Exam   Constitutional: She is oriented to person, place, and time. She appears well-developed and well-nourished. No distress.   HENT:   Head: Normocephalic and atraumatic.   Nose: Nose normal.   Mouth/Throat: Oropharynx is clear and moist.   Eyes: Conjunctivae are normal. No scleral icterus.   Neck: No JVD present.   Primarily expiratory stridor   Cardiovascular: Normal rate and regular rhythm.   No murmur heard.  Pulmonary/Chest: Effort normal. Stridor present. No respiratory distress. She has no wheezes. She has no rales.   Abdominal: Soft. There is no tenderness. There is no rebound and no guarding.   Musculoskeletal: She exhibits edema.   Neurological: She is alert and oriented to person, place, and time.   Skin: Skin is warm and dry. No rash noted. She is not diaphoretic.   Psychiatric: She has a normal mood and affect. Thought content normal.   Nursing note and vitals reviewed.      Laboratory:  Recent Labs     09/25/19  0612 09/25/19  0910   WBC 7.7 8.7   RBC 4.32 4.36   HEMOGLOBIN 14.0 13.8   HEMATOCRIT 43.8 42.8   .4* 98.2*   MCH 32.4 31.7   MCHC 32.0* 32.2*   RDW 48.4 46.5   PLATELETCT 189 181   MPV 11.3 11.3     Recent Labs     09/25/19  0612 09/25/19  0910   SODIUM 141 140   POTASSIUM 3.8 4.1   CHLORIDE 107 110   CO2 20 16*   GLUCOSE 89 140*   BUN 9 11   CREATININE 0.81  0.80   CALCIUM 9.6 9.7     Recent Labs     09/25/19  0612 09/25/19  0910   ALTSGPT 41 43   ASTSGOT 15 29   ALKPHOSPHAT 33 32   TBILIRUBIN 0.5 0.4   GLUCOSE 89 140*         No results for input(s): NTPROBNP in the last 72 hours.      No results for input(s): TROPONINT in the last 72 hours.    Urinalysis:    Recent Labs     09/25/19  0612   SPECGRAVITY 1.026   GLUCOSEUR Negative   KETONES Negative   NITRITE Negative   LEUKESTERAS Negative   WBCURINE 10-20*   RBCURINE 5-10*   BACTERIA Negative   EPITHELCELL Moderate*        Imaging:  DX-NECK FOR SOFT TISSUE   Final Result    Examination limited by patient body habitus.   No prevertebral/retropharyngeal edema is identified.         DX-CHEST-PORTABLE (1 VIEW)   Final Result      No acute cardiopulmonary process is seen.            Assessment/Plan:  I anticipate this patient will require at least two midnights for appropriate medical management, necessitating inpatient admission.    * Chronic respiratory failure with hypoxia, on home oxygen therapy (HCC)- (present on admission)  Assessment & Plan  Patient is maintained on montelukast and Spiriva  O2 and respiratory protocols  Pulmonology consult  Continue 10 mg of prednisone    Controlled type 2 diabetes mellitus without complication, without long-term current use of insulin (HCC)- (present on admission)  Assessment & Plan  The patient's last A1c 1 month ago was 6.0.  Continue her dulaglutide and cover with sliding scale    Stridor  Assessment & Plan  Patient's exam is somewhat inconsistent.  In addition her stridor is primarily expiratory.  We will admit to the ICU for close observation.  Pulmonology is consulted.  We will ask speech to do a fees study in the a.m.    Optic neuritis  Assessment & Plan  Continue CellCept    TONYA (obstructive sleep apnea)- (present on admission)  Assessment & Plan  Followed outpatient by pulmonology.  Currently not on CPAP    Acquired hypothyroidism- (present on admission)  Assessment &  Plan  Continue Synthroid    GERD (gastroesophageal reflux disease)- (present on admission)  Assessment & Plan  Continue H2    H/O prior ablation treatment- (present on admission)  Assessment & Plan  Patient is a previous history of SVT and is status post ablation.  Continue Ivabradine    Anxiety- (present on admission)  Assessment & Plan  Continue the patient's outpatient psychiatric medication regimen      VTE prophylaxis: Enoxaparin

## 2019-09-25 NOTE — ED NOTES
"Pt to bedside commode, BM noted, RN assisted pt, pt feeling \"shaky\" blood sugar checked. ICU RN and RT at bedside for transfer, pt in stable condition.   "

## 2019-09-25 NOTE — ASSESSMENT & PLAN NOTE
Patient is maintained on montelukast and Spiriva  She has been on high flow oxygen  Her baseline oxygen is 2-4 L NC  O2 and respiratory protocols  Pulmonology consulted  Continue prednisone  Patient's pulmonary doctor is Dr Herrera, has follow-up with him on 10/4

## 2019-09-25 NOTE — ED PROVIDER NOTES
"ED Provider Note    CHIEF COMPLAINT  Chief Complaint   Patient presents with   • Shortness of Breath     since 7am. has hx of asthma. used 2 duoneb treatment w/o relief. has audible wheezing. noted increased work of breathing.        HPI  Kristin Balderrama is a 28 y.o. female with a history of asthma on home O2 3 L followed by pulmonology/Emily currently on 10 mg of prednisone taper who presents complaining of shortness of breath/wheezing.  Patient states her symptoms began acutely this morning after being exposed to tobacco smoke--she states her  is a smoker.  Patient used to nebulizers at home prior to arrival without relief.    Patient denies fever, chills, hemoptysis, leg swelling, calf pain.      ALLERGIES  Allergies   Allergen Reactions   • Cefdinir Shortness of Breath and Itching     Tolerated 1/18/17  Tolerates ceftriaxone    • Depakote [Divalproex Sodium] Unspecified     Muscle spasms/muscle pain and weakness     • Doxycycline Anaphylaxis and Vomiting     RXN=unknown   • Amitriptyline Unspecified     Headaches     • Aripiprazole [Abilify] Unspecified     Headaches/muscle twitching     • Ciprofloxacin Rash     Pt states \"I get a rash\".     • Clindamycin Nausea     Even with food     • Ees [Erythromycin] Vomiting and Nausea   • Flagyl [Metronidazole Hcl] Unspecified     \"eye problems\"     • Flomax [Tamsulosin Hydrochloride] Swelling   • Metformin Unspecified     Increased lactic acid      • Tape Rash     Tears skin off  coban with Tegaderm tape ok intermittently  RXN=ongoing   • Vancomycin Itching     Pt becomes flushed in face and chest.   RXN=7/10/16   • Wound Dressing Adhesive Hives     By pt report   • Cephalexin [Keflex] Rash     Pt states she gets a rash with this medication  Tolerates ceftriaxone   • Divalproex Sodium [Valproic Acid] Rash     .   • Levofloxacin Unspecified     Leg muscle cramps   • Metronidazole Rash     .   • Tamsulosin Hcl        CURRENT MEDICATIONS  Home Medications  "    Reviewed by Vanessa Bone R.N. (Registered Nurse) on 09/25/19 at 0907  Med List Status: <None>   Medication Last Dose Status   albuterol (PROVENTIL) 2.5mg/3ml Nebu Soln solution for nebulization  Active   albuterol 108 (90 Base) MCG/ACT Aero Soln inhalation aerosol  Active   aspirin EC (ECOTRIN) 81 MG Tablet Delayed Response  Active   budesonide-formoterol (SYMBICORT) 160-4.5 MCG/ACT Aerosol  Active   busPIRone (BUSPAR) 10 MG Tab tablet  Active   Diclofenac Sodium 1 % Gel  Active   Diphenhydramine-APAP, sleep, (TYLENOL PM EXTRA STRENGTH PO)  Active   Dulaglutide (TRULICITY) 0.75 MG/0.5ML Solution Pen-injector  Active   etonogestrel (NEXPLANON) 68 MG Implant implant  Active   fluoxetine (PROZAC) 40 MG capsule  Active   Folic Acid 0.8 MG Cap  Active   furosemide (LASIX) 80 MG Tab  Active   gabapentin (NEURONTIN) 300 MG Cap  Active   ipratropium-albuterol (DUONEB) 0.5-2.5 (3) MG/3ML nebulizer solution  Active   Ivabradine HCl (CORLANOR) 7.5 MG Tab  Active   levothyroxine (SYNTHROID) 75 MCG Tab  Active   LYRICA 300 MG capsule  Active   Melatonin 5 MG Tab  Active   methocarbamol (ROBAXIN) 750 MG Tab  Active   montelukast (SINGULAIR) 10 MG Tab  Active   mycophenolate (CELLCEPT) 500 MG tablet  Active   ondansetron (ZOFRAN) 4 MG Tab tablet  Active   predniSONE (DELTASONE) 20 MG Tab  Active   ranitidine (ZANTAC) 300 MG tablet  Active   sodium bicarbonate 325 MG Tab  Active   tiotropium (SPIRIVA) 18 MCG Cap  Active   traZODone (DESYREL) 100 MG Tab  Active   ziprasidone (GEODON) 80 MG Cap  Active                PAST MEDICAL HISTORY   has a past medical history of Abdominal pain, Anginal syndrome, Apnea, sleep, Arrhythmia, Arthritis, ASTHMA, Atrial fibrillation (HCC), Back pain, Borderline personality disorder (HCC), Breath shortness, Bronchitis, Cardiac arrhythmia, Chickenpox, Chronic UTI (9/18/2010), Cough, Daytime sleepiness, Depression, Diabetes (HCC), Diarrhea, Disorder of thyroid, Fall, Fatigue, Frequent headaches,  Gasping for breath, Gynecological disorder, Headache(784.0), Heart burn, History of falling, Hypertension, Indigestion, Migraine, 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HCC), 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HCC), Nausea, Obesity, Pain (08-15-12), Painful joint, PCOS (polycystic ovarian syndrome), Pneumonia (2012), Psychosis (HCC), Renal disorder, Ringing in ears, Scoliosis, Shortness of breath, Sinus tachycardia (10/31/2013), Sleep apnea, Snoring, Tonsillitis, Tuberculosis, Urinary bladder disorder, Urinary incontinence, Weakness, and Wears glasses.    SURGICAL HISTORY   has a past surgical history that includes neuro dest facet l/s w/ig sngl (4/21/2015); recovery (1/27/2016); delmar by laparoscopy (8/29/2010); lumbar fusion anterior (8/21/2012); other cardiac surgery (1/2016); tonsillectomy; bowel stimulator insertion (7/13/2016); gastroscopy with balloon dilatation (N/A, 1/4/2017); muscle biopsy (Right, 1/26/2017); other abdominal surgery; and laminotomy.    SOCIAL HISTORY  Social History     Tobacco Use   • Smoking status: Never Smoker   • Smokeless tobacco: Never Used   Substance and Sexual Activity   • Alcohol use: No     Alcohol/week: 0.0 oz   • Drug use: Yes     Frequency: 7.0 times per week     Types: Marijuana, Oral     Comment: Medicinal edible's   • Sexual activity: Not Currently     Birth control/protection: Pill       REVIEW OF SYSTEMS  See HPI for further details.  All other systems are negative except as above in HPI.      PHYSICAL EXAM  VITAL SIGNS: /74   Pulse 90   Temp 36.4 °C (97.5 °F) (Temporal)   Resp 20   Wt (!) 129.6 kg (285 lb 11.5 oz)   SpO2 96%   BMI 47.55 kg/m²     General:  WD obese, tachypneic, audible high-pitched upper airway noise on expiration; A+Ox3; V/S as above  Skin: warm and dry; pale color; acneiform rash  HEENT: Cushingoid face; AT; EOMs intact; PERRL; no scleral icterus   Neck: FROM; no meningismus, no LAD; expiratory stridor  Cardiovascular: Regular heart rate and rhythm.  No  murmurs, rubs, or gallops; pulses 2+ bilaterally radially   Lungs: Upper airway sounds noted; clear to auscultation with good air movement bilaterally.  No wheezes, rhonchi, or rales.   Abdomen: BS present; soft; NTND; no rebound, guarding, or rigidity.  No organomegaly or pulsatile mass  Extremities: NEWTON x 4; no e/o trauma; no pedal edema; neg Dhiraj's  Neurologic: CNs III-XII grossly intact; speech clear; distal sensation intact; strength 5/5 UE/LEs  Psychiatric: Appropriate affect, normal mood    LABS  Results for orders placed or performed during the hospital encounter of 09/25/19   Blood Culture,Hold   Result Value Ref Range    Blood Culture Hold Collected    CBC WITH DIFFERENTIAL   Result Value Ref Range    WBC 8.7 4.8 - 10.8 K/uL    RBC 4.36 4.20 - 5.40 M/uL    Hemoglobin 13.8 12.0 - 16.0 g/dL    Hematocrit 42.8 37.0 - 47.0 %    MCV 98.2 (H) 81.4 - 97.8 fL    MCH 31.7 27.0 - 33.0 pg    MCHC 32.2 (L) 33.6 - 35.0 g/dL    RDW 46.5 35.9 - 50.0 fL    Platelet Count 181 164 - 446 K/uL    MPV 11.3 9.0 - 12.9 fL    Neutrophils-Polys 58.30 44.00 - 72.00 %    Lymphocytes 33.20 22.00 - 41.00 %    Monocytes 5.20 0.00 - 13.40 %    Eosinophils 2.30 0.00 - 6.90 %    Basophils 0.30 0.00 - 1.80 %    Immature Granulocytes 0.70 0.00 - 0.90 %    Nucleated RBC 0.00 /100 WBC    Neutrophils (Absolute) 5.08 2.00 - 7.15 K/uL    Lymphs (Absolute) 2.89 1.00 - 4.80 K/uL    Monos (Absolute) 0.45 0.00 - 0.85 K/uL    Eos (Absolute) 0.20 0.00 - 0.51 K/uL    Baso (Absolute) 0.03 0.00 - 0.12 K/uL    Immature Granulocytes (abs) 0.06 0.00 - 0.11 K/uL    NRBC (Absolute) 0.00 K/uL   CMP   Result Value Ref Range    Sodium 140 135 - 145 mmol/L    Potassium 4.1 3.6 - 5.5 mmol/L    Chloride 110 96 - 112 mmol/L    Co2 16 (L) 20 - 33 mmol/L    Anion Gap 14.0 (H) 0.0 - 11.9    Glucose 140 (H) 65 - 99 mg/dL    Bun 11 8 - 22 mg/dL    Creatinine 0.80 0.50 - 1.40 mg/dL    Calcium 9.7 8.5 - 10.5 mg/dL    AST(SGOT) 29 12 - 45 U/L    ALT(SGPT) 43 2 - 50 U/L     Alkaline Phosphatase 32 30 - 99 U/L    Total Bilirubin 0.4 0.1 - 1.5 mg/dL    Albumin 4.9 3.2 - 4.9 g/dL    Total Protein 6.7 6.0 - 8.2 g/dL    Globulin 1.8 (L) 1.9 - 3.5 g/dL    A-G Ratio 2.7 g/dL   ESTIMATED GFR   Result Value Ref Range    GFR If African American >60 >60 mL/min/1.73 m 2    GFR If Non African American >60 >60 mL/min/1.73 m 2   ACCU-CHEK GLUCOSE   Result Value Ref Range    Glucose - Accu-Ck 131 (H) 65 - 99 mg/dL     *Note: Due to a large number of results and/or encounters for the requested time period, some results have not been displayed. A complete set of results can be found in Results Review.           IMAGING  DX-NECK FOR SOFT TISSUE   Final Result    Examination limited by patient body habitus.   No prevertebral/retropharyngeal edema is identified.         DX-CHEST-PORTABLE (1 VIEW)   Final Result      No acute cardiopulmonary process is seen.          MEDICAL RECORD  I have reviewed patient's medical record and pertinent results are listed below.      COURSE & MEDICAL DECISION MAKING  I have reviewed any medical record information, laboratory studies and radiographic results as noted.    Kristin Balderrama is a 28 y.o. female with a history of asthma who presents complaining of shortness of breath.  Patient has expiratory stridor on exam.  There are no wheezes.  Labs were not obtained initially given that the patient appeared to have an isolated respiratory issue, asthma exacerbation versus vocal cord dysfunction.    I do not suspect sepsis given no fever, sputum, or other symptoms/signs of infection.    Patient was given a DuoNeb with persistent stridor.  Heliox was initiated.    Chest x-ray demonstrates no acute process.  I obtain a soft tissue of the neck given the stridor which was negative for any obvious pathology.      Pt was re-evaluated at 10:02 AM  Patient feels improved.  Stridor noted when I entered the room which disappears when she speaks to me.    11:40 AM  Patient  reevaluated.  She states she had difficulty breathing once off the heliox.  I discussed the case with pulmonology who feels ENT should see the patient.  Patient states she has seen Dr. Chan in the past.    11:50 AM  I discussed the case with Dr. Shen who agrees to admit the patient.  I advised the pulmonologist on-call, Dr. Estrada, that the patient would be admitted.  He will consult on the patient.      FINAL IMPRESSION  1. SOB (shortness of breath)     2. Stridor              Electronically signed by: Catalina uBll, 9/25/2019 9:11 AM

## 2019-09-25 NOTE — ASSESSMENT & PLAN NOTE
She gained about 20lbs since starting prednisone last year, however she was still ~260lbs prior to prednisone  Seen at [facility] for metabolic disorder    Plan:  - Encourage behavioral and dietary modification for weight loss and wean prednisone as outpatient  - Nutritionist consultation

## 2019-09-25 NOTE — ED NOTES
Pt. Sitting on bed receiving breathing treatment, pt. On cardiac monitor, pt. Mother at bedside, safety precautions maintained, will continue to monitor.

## 2019-09-25 NOTE — ED TRIAGE NOTES
Chief Complaint   Patient presents with   • Shortness of Breath     since 7am. has hx of asthma. used 2 duoneb treatment w/o relief. has audible wheezing. noted increased work of breathing.      Has audible wheezing. Charge notified.

## 2019-09-25 NOTE — ASSESSMENT & PLAN NOTE
The patient's last A1c 1 month ago was 6.0.  Continue her dulaglutide and cover with sliding scale

## 2019-09-26 LAB
CREAT UR-MCNC: 150.8 MG/DL
GLUCOSE BLD-MCNC: 107 MG/DL (ref 65–99)
GLUCOSE BLD-MCNC: 148 MG/DL (ref 65–99)
GLUCOSE BLD-MCNC: 156 MG/DL (ref 65–99)
GLUCOSE BLD-MCNC: 170 MG/DL (ref 65–99)
PROT UR-MCNC: 9.6 MG/DL (ref 0–15)
PROT/CREAT UR: 64 MG/G (ref 10–107)

## 2019-09-26 PROCEDURE — 92612 ENDOSCOPY SWALLOW (FEES) VID: CPT

## 2019-09-26 PROCEDURE — 700111 HCHG RX REV CODE 636 W/ 250 OVERRIDE (IP): Performed by: HOSPITALIST

## 2019-09-26 PROCEDURE — 99233 SBSQ HOSP IP/OBS HIGH 50: CPT | Performed by: INTERNAL MEDICINE

## 2019-09-26 PROCEDURE — 99232 SBSQ HOSP IP/OBS MODERATE 35: CPT | Performed by: HOSPITALIST

## 2019-09-26 PROCEDURE — 700101 HCHG RX REV CODE 250: Performed by: HOSPITALIST

## 2019-09-26 PROCEDURE — 770006 HCHG ROOM/CARE - MED/SURG/GYN SEMI*

## 2019-09-26 PROCEDURE — 94640 AIRWAY INHALATION TREATMENT: CPT

## 2019-09-26 PROCEDURE — 700102 HCHG RX REV CODE 250 W/ 637 OVERRIDE(OP): Performed by: HOSPITALIST

## 2019-09-26 PROCEDURE — A9270 NON-COVERED ITEM OR SERVICE: HCPCS | Performed by: HOSPITALIST

## 2019-09-26 PROCEDURE — 94760 N-INVAS EAR/PLS OXIMETRY 1: CPT

## 2019-09-26 PROCEDURE — 700111 HCHG RX REV CODE 636 W/ 250 OVERRIDE (IP): Performed by: INTERNAL MEDICINE

## 2019-09-26 PROCEDURE — 82962 GLUCOSE BLOOD TEST: CPT | Mod: 91

## 2019-09-26 PROCEDURE — 700101 HCHG RX REV CODE 250

## 2019-09-26 RX ORDER — IPRATROPIUM BROMIDE AND ALBUTEROL SULFATE 2.5; .5 MG/3ML; MG/3ML
SOLUTION RESPIRATORY (INHALATION)
Status: COMPLETED
Start: 2019-09-26 | End: 2019-09-26

## 2019-09-26 RX ORDER — IPRATROPIUM BROMIDE AND ALBUTEROL SULFATE 2.5; .5 MG/3ML; MG/3ML
3 SOLUTION RESPIRATORY (INHALATION)
Status: DISCONTINUED | OUTPATIENT
Start: 2019-09-26 | End: 2019-10-01 | Stop reason: HOSPADM

## 2019-09-26 RX ORDER — LORAZEPAM 1 MG/1
1 TABLET ORAL 3 TIMES DAILY PRN
Status: DISCONTINUED | OUTPATIENT
Start: 2019-09-26 | End: 2019-10-01 | Stop reason: HOSPADM

## 2019-09-26 RX ADMIN — PREGABALIN 300 MG: 150 CAPSULE ORAL at 17:50

## 2019-09-26 RX ADMIN — GABAPENTIN 300 MG: 300 CAPSULE ORAL at 21:44

## 2019-09-26 RX ADMIN — TRAMADOL HYDROCHLORIDE 50 MG: 50 TABLET ORAL at 16:10

## 2019-09-26 RX ADMIN — BUSPIRONE HYDROCHLORIDE 10 MG: 10 TABLET ORAL at 17:51

## 2019-09-26 RX ADMIN — FAMOTIDINE 20 MG: 20 TABLET ORAL at 05:26

## 2019-09-26 RX ADMIN — LEVOTHYROXINE SODIUM 75 MCG: 75 TABLET ORAL at 05:25

## 2019-09-26 RX ADMIN — INSULIN HUMAN 2 UNITS: 100 INJECTION, SOLUTION PARENTERAL at 17:38

## 2019-09-26 RX ADMIN — METHOCARBAMOL TABLETS 750 MG: 750 TABLET, COATED ORAL at 12:20

## 2019-09-26 RX ADMIN — IPRATROPIUM BROMIDE AND ALBUTEROL SULFATE 3 ML: .5; 3 SOLUTION RESPIRATORY (INHALATION) at 01:47

## 2019-09-26 RX ADMIN — TRAZODONE HYDROCHLORIDE 100 MG: 50 TABLET ORAL at 21:44

## 2019-09-26 RX ADMIN — GABAPENTIN 300 MG: 300 CAPSULE ORAL at 17:52

## 2019-09-26 RX ADMIN — FLUOXETINE HYDROCHLORIDE 40 MG: 20 CAPSULE ORAL at 05:26

## 2019-09-26 RX ADMIN — TRAMADOL HYDROCHLORIDE 50 MG: 50 TABLET ORAL at 03:50

## 2019-09-26 RX ADMIN — ZIPRASIDONE HYDROCHLORIDE 80 MG: 80 CAPSULE ORAL at 05:27

## 2019-09-26 RX ADMIN — PREGABALIN 300 MG: 150 CAPSULE ORAL at 05:44

## 2019-09-26 RX ADMIN — BUSPIRONE HYDROCHLORIDE 10 MG: 10 TABLET ORAL at 05:27

## 2019-09-26 RX ADMIN — IPRATROPIUM BROMIDE AND ALBUTEROL SULFATE 3 ML: .5; 3 SOLUTION RESPIRATORY (INHALATION) at 18:43

## 2019-09-26 RX ADMIN — TIOTROPIUM BROMIDE 1 CAPSULE: 18 CAPSULE ORAL; RESPIRATORY (INHALATION) at 12:19

## 2019-09-26 RX ADMIN — FOLIC ACID 1 MG: 1 TABLET ORAL at 05:25

## 2019-09-26 RX ADMIN — ONDANSETRON 4 MG: 2 INJECTION INTRAMUSCULAR; INTRAVENOUS at 13:10

## 2019-09-26 RX ADMIN — FAMOTIDINE 20 MG: 20 TABLET ORAL at 17:50

## 2019-09-26 RX ADMIN — BUDESONIDE AND FORMOTEROL FUMARATE DIHYDRATE 2 PUFF: 160; 4.5 AEROSOL RESPIRATORY (INHALATION) at 17:45

## 2019-09-26 RX ADMIN — ONDANSETRON 4 MG: 2 INJECTION INTRAMUSCULAR; INTRAVENOUS at 07:19

## 2019-09-26 RX ADMIN — ENOXAPARIN SODIUM 40 MG: 100 INJECTION SUBCUTANEOUS at 05:31

## 2019-09-26 RX ADMIN — IPRATROPIUM BROMIDE AND ALBUTEROL SULFATE 3 ML: .5; 3 SOLUTION RESPIRATORY (INHALATION) at 10:15

## 2019-09-26 RX ADMIN — IPRATROPIUM BROMIDE AND ALBUTEROL SULFATE 3 ML: .5; 3 SOLUTION RESPIRATORY (INHALATION) at 06:16

## 2019-09-26 RX ADMIN — BUDESONIDE AND FORMOTEROL FUMARATE DIHYDRATE 2 PUFF: 160; 4.5 AEROSOL RESPIRATORY (INHALATION) at 05:31

## 2019-09-26 RX ADMIN — METHOCARBAMOL TABLETS 750 MG: 750 TABLET, COATED ORAL at 21:44

## 2019-09-26 RX ADMIN — SODIUM BICARBONATE 650 MG: 650 TABLET ORAL at 05:26

## 2019-09-26 RX ADMIN — SENNOSIDES, DOCUSATE SODIUM 2 TABLET: 50; 8.6 TABLET, FILM COATED ORAL at 05:22

## 2019-09-26 RX ADMIN — GABAPENTIN 300 MG: 300 CAPSULE ORAL at 12:20

## 2019-09-26 RX ADMIN — FUROSEMIDE 80 MG: 40 TABLET ORAL at 05:24

## 2019-09-26 RX ADMIN — SENNOSIDES, DOCUSATE SODIUM 2 TABLET: 50; 8.6 TABLET, FILM COATED ORAL at 17:51

## 2019-09-26 RX ADMIN — PREDNISONE 20 MG: 20 TABLET ORAL at 05:27

## 2019-09-26 RX ADMIN — METHOCARBAMOL TABLETS 750 MG: 750 TABLET, COATED ORAL at 07:19

## 2019-09-26 RX ADMIN — INSULIN HUMAN 2 UNITS: 100 INJECTION, SOLUTION PARENTERAL at 11:01

## 2019-09-26 RX ADMIN — ONDANSETRON 4 MG: 2 INJECTION INTRAMUSCULAR; INTRAVENOUS at 01:08

## 2019-09-26 RX ADMIN — GABAPENTIN 300 MG: 300 CAPSULE ORAL at 10:08

## 2019-09-26 RX ADMIN — ZIPRASIDONE HYDROCHLORIDE 80 MG: 80 CAPSULE ORAL at 17:51

## 2019-09-26 RX ADMIN — SODIUM BICARBONATE 650 MG: 650 TABLET ORAL at 17:50

## 2019-09-26 RX ADMIN — MYCOPHENOLATE MOFETIL 1000 MG: 250 CAPSULE ORAL at 17:52

## 2019-09-26 RX ADMIN — MYCOPHENOLATE MOFETIL 1000 MG: 250 CAPSULE ORAL at 05:29

## 2019-09-26 RX ADMIN — ASPIRIN 81 MG: 81 TABLET, COATED ORAL at 05:26

## 2019-09-26 RX ADMIN — IPRATROPIUM BROMIDE AND ALBUTEROL SULFATE 3 ML: .5; 3 SOLUTION RESPIRATORY (INHALATION) at 15:59

## 2019-09-26 RX ADMIN — LORAZEPAM 1 MG: 1 TABLET ORAL at 15:40

## 2019-09-26 RX ADMIN — TRAMADOL HYDROCHLORIDE 50 MG: 50 TABLET ORAL at 10:08

## 2019-09-26 ASSESSMENT — ENCOUNTER SYMPTOMS
BACK PAIN: 0
ABDOMINAL PAIN: 0
SPUTUM PRODUCTION: 0
SHORTNESS OF BREATH: 1
FEVER: 0
COUGH: 1
BRUISES/BLEEDS EASILY: 0
BLURRED VISION: 0
NERVOUS/ANXIOUS: 1
DEPRESSION: 0
SINUS PAIN: 0
WHEEZING: 1
VOMITING: 0
CHILLS: 0
NAUSEA: 0
HEMOPTYSIS: 0
DIZZINESS: 0

## 2019-09-26 NOTE — CARE PLAN
Problem: Safety  Goal: Will remain free from injury  Outcome: PROGRESSING AS EXPECTED     Problem: Communication  Goal: The ability to communicate needs accurately and effectively will improve  Outcome: PROGRESSING AS EXPECTED     Problem: Infection  Goal: Will remain free from infection  Outcome: PROGRESSING AS EXPECTED     Problem: Venous Thromboembolism (VTW)/Deep Vein Thrombosis (DVT) Prevention:  Goal: Patient will participate in Venous Thrombosis (VTE)/Deep Vein Thrombosis (DVT)Prevention Measures  Outcome: PROGRESSING AS EXPECTED     Problem: Bowel/Gastric:  Goal: Normal bowel function is maintained or improved  Outcome: PROGRESSING AS EXPECTED     Problem: Knowledge Deficit  Goal: Knowledge of disease process/condition, treatment plan, diagnostic tests, and medications will improve  Outcome: PROGRESSING AS EXPECTED     Problem: Respiratory:  Goal: Respiratory status will improve  Outcome: PROGRESSING AS EXPECTED     Problem: Knowledge Deficit  Goal: Knowledge of the prescribed therapeutic regimen will improve  Outcome: PROGRESSING AS EXPECTED     Problem: Fluid Volume:  Goal: Will maintain balanced intake and output  Outcome: PROGRESSING AS EXPECTED     Problem: Mobility  Goal: Risk for activity intolerance will decrease  Outcome: PROGRESSING AS EXPECTED     Problem: Pain Management  Goal: Pain level will decrease to patient's comfort goal  Outcome: PROGRESSING AS EXPECTED     Problem: Psychosocial Needs:  Goal: Level of anxiety will decrease  Outcome: PROGRESSING AS EXPECTED

## 2019-09-26 NOTE — THERAPY
"Speech Language Therapy FEES completed.  Functional Status: The patient reports occasional coughing and aspiration at home \"they had me on a puree diet but that was years ago when they thought I had a stroke.\" She also reports esophageal issues where the food gets stuck and she has to drink a lot of fluids to push it down. Barium swallow in 2016 reported mild esophageal dysmotility. Patient reports dilatation completed with GI as an outpatient. She currently reports that she has a high pitched squeaking when she is breathing. I do not detect this at this time.     MD at bedside discussing FEES order.     Patient in agreement and FEES initiated. No glottal obstruction seen. Pharyngeal walls moving symmetrically. She was given ice, thins, thick, puree, pudding, and soft mixed textures. No aspiration was noted. However, penetration was seen with thin liquids due to a delayed initiation of the swallow. She had an immediate cough when thin liquids trickled onto the arytenoids as well as into the laryngeal vestibule during the swallow. Penetration was noted twice during the evaluation.     After she coughed she started to wheeze. Still no stridor detected. Her wheezing was during exhalation and vocal folds were in a partially abducted position. Wheezing was intermittent. SpO2 remained at 96% throughout the study.     Towards the end of the evaluation she was noted to be belching and reported globus. The scope was removed. Within 3 minutes she had regurgitated food/liquid that she was given for the study. Results discussed with patient, RN and MD.     Recommend to continue with regular textures but to be upright in a chair or on the edge of the couch/bed. (She reports aspiration episodes happen when she is very tired and trying to eat/drink in bed). Swallow precautions instructed.     Consider GI consult.       Recommendations - Diet: Diet / Liquid Recommendation: Regular, Thin Liquid                          Strategies: " "Head of Bed at 90 Degrees                          Medication Administration: Medication Administration : Whole with Liquid Wash    Plan of Care: Will benefit from Speech Therapy 2 times per week  Post-Acute Therapy: Anticipate that the patient will have no further speech therapy needs after discharge from the hospital.      See \"Rehab Therapy-Acute\" Patient Summary Report for complete documentation.   "

## 2019-09-26 NOTE — DIETARY
NUTRITION SERVICES: BMI - Pt with BMI >40 (=Body mass index is 47.62 kg/m².), morbid obesity. Weight loss counseling not appropriate in acute care setting. RECOMMEND - Referral to outpatient nutrition services for weight management after D/C.

## 2019-09-26 NOTE — CARE PLAN
Problem: Respiratory:  Goal: Respiratory status will improve  Note:   Collaboration w/ RT. Continuous O2 monitoring in place. HFNC in use. Lung sounds assessed and documented      Problem: Psychosocial Needs:  Goal: Level of anxiety will decrease  Note:   Reassurance provided. Pt frequently checked on. Television utilized for distraction

## 2019-09-26 NOTE — CARE PLAN
Problem: Communication  Goal: The ability to communicate needs accurately and effectively will improve  Outcome: PROGRESSING AS EXPECTED  Note:   Pt is encouraged to voice feelings, therapeutic communication was utilized.      Problem: Knowledge Deficit  Goal: Knowledge of disease process/condition, treatment plan, diagnostic tests, and medications will improve  Outcome: PROGRESSING AS EXPECTED

## 2019-09-26 NOTE — DISCHARGE PLANNING
Care Transition Team Assessment  This is the patient's 4 th admit this year, has had numerous ER visits.  Patient believes she is in poor health due to her back surgery.  Strong family support, lives with her grandparents.  Uses [facility] on [address] for her pharmacy, uses a walker/wheelchair, O 2 provider is [facility] and is on 2-4 liters.      Information Source  Orientation : Oriented x 4  Information Given By: Patient  Informant's Name: Vivienne Mclean [phone]  Who is responsible for making decisions for patient? : Patient    Readmission Evaluation  Is this a readmission?: Yes - unplanned readmission  Why do you think you were readmitted?: (asthma)  Was an appointment arranged for you prior to discharge?: No  Were there new prescriptions you were supposed to fill after you were discharged?: Yes, prescriptions filled  Did you understand your discharge instructions?: Yes  Did you have enough support after your last discharge?: Yes    Elopement Risk  Legal Hold: No  Ambulatory or Self Mobile in Wheelchair: No-Not an Elopement Risk  Disoriented: No  Psychiatric Symptoms: None  History of Wandering: No  Elopement this Admit: No  Vocalizing Wanting to Leave: No  Displays Behaviors, Body Language Wanting to Leave: No-Not at Risk for Elopement  Elopement Risk: Not at Risk for Elopement    Interdisciplinary Discharge Planning  Does Admitting Nurse Feel This Could be a Complex Discharge?: Yes  Lives with - Patient's Self Care Capacity: Other (Comments)(patient lives with grandparent)  Patient or legal guardian wants to designate a caregiver (see row info): No  Support Systems: Family Member(s), Friends / Neighbors  Housing / Facility: 1 Story House  Do You Take your Prescribed Medications Regularly: Yes  Able to Return to Previous ADL's: Future Time w/Therapy  Mobility Issues: Yes  Prior Services: Skilled Home Health Services(previously at skilled nursing [facility] [place])  Assistance Needed: Yes  Durable Medical  Equipment: Home Oxygen, Walker    Discharge Preparedness  What is your plan after discharge?: Uncertain - pending medical team collaboration  What are your discharge supports?: Grandparent, Parent  Prior Functional Level: Ambulatory, Uses Walker, Uses Wheelchair  Difficulity with ADLs: Walking  Difficulty with ADLs Comment: (fused back, only walk short distance)  Difficulity with IADLs: Driving  Difficulity with IADL Comments: (family drives me)    Functional Assesment  Prior Functional Level: Ambulatory, Uses Walker, Uses Wheelchair    Finances  Financial Barriers to Discharge: No(SSD $780 plus $200 in food stamps)  Prescription Coverage: Yes    Vision / Hearing Impairment  Vision Impairment : Yes  Right Eye Vision: Impaired, Wears Glasses  Left Eye Vision: Impaired, Wears Glasses  Hearing Impairment : No  Does Pt Need Special Equipment for the Hearing Impaired?: No         Advance Directive  Advance Directive?: None  Advance Directive offered?: AD Booklet refused    Domestic Abuse  Have you ever been the victim of abuse or violence?: No  Physical Abuse or Sexual Abuse: No  Verbal Abuse or Emotional Abuse: No  Possible Abuse Reported to:: Not Applicable    Psychological Assessment  History of Substance Abuse: None  History of Psychiatric Problems: No  Non-compliant with Treatment: No  Newly Diagnosed Illness: No    Discharge Risks or Barriers  Discharge risks or barriers?: Complex medical needs  Patient risk factors: Complex medical needs    Anticipated Discharge Information  Anticipated discharge disposition: Home  Discharge Address: (face sheet)

## 2019-09-26 NOTE — PROGRESS NOTES
Pt arrived to unit via wheelchair at 1200. Pt oriented to room, unit, and plan of care. Pt is A&Ox4, Pt is on 4 LO2 via nasal cannula,  in place. SPO2% is 94%. All questions answered at this time. Lung sounds and respiratory effort assessed regularly.Call light within reach; fall precautions in place.

## 2019-09-26 NOTE — PROGRESS NOTES
Hospital Medicine Daily Progress Note    Date of Service  9/26/2019    Chief Complaint  28 y.o. female admitted 9/25/2019 with wheezing.    Hospital Course    Ms. Balderrama has a past medical history of optic neuritis as well as asthma.  She was recently discharged from [facility] due to asthma exacerbation and is on a prednisone taper now at 10 mg daily.  She presented to the emergency room with complaints of stridor and wheezing that started the morning of presentation.  Given the degree of her symptoms, she has been admitted to the intensive care unit with critical care consultation.      Interval Problem Update  9/26: Patient seen and evaluated in the intensive care unit. She is on high flow oxygen. She is on high dose lasix. She is scheduled for a FEES today. She is on 3-4 liters oxygen at home. She has been on keflex for folliculitis. I discussed with speech path. She feels much better.     Consultants/Specialty  Critical care. I discussed with Dr. Gonda on ICU Hot Rounds.     Code Status  full    Disposition  Medical     Review of Systems  Review of Systems   Constitutional: Negative for chills and fever.   Respiratory: Positive for shortness of breath and wheezing.    Cardiovascular: Negative for chest pain.   Gastrointestinal: Negative for nausea.   All other systems reviewed and are negative.       Physical Exam  Temp:  [36.1 °C (97 °F)-36.4 °C (97.5 °F)] 36.1 °C (97 °F)  Pulse:  [] 20  Resp:  [13-36] 13  BP: (108-163)/() 138/89  SpO2:  [91 %-98 %] 96 %    Physical Exam   Constitutional: She is oriented to person, place, and time. No distress.   Obese    Neck: Neck supple.   Cardiovascular: Normal rate and regular rhythm.   No murmur heard.  Pulmonary/Chest: Effort normal and breath sounds normal. She has no wheezes. She has no rales.   Abdominal: Soft. She exhibits no distension. There is no tenderness.   Neurological: She is alert and oriented to person, place, and time.   Skin:  Skin is warm and dry. She is not diaphoretic. There is pallor.   Nursing note and vitals reviewed.      Fluids    Intake/Output Summary (Last 24 hours) at 9/26/2019 0652  Last data filed at 9/26/2019 0600  Gross per 24 hour   Intake 1000 ml   Output 2350 ml   Net -1350 ml       Laboratory  Recent Labs     09/25/19  0612 09/25/19  0910   WBC 7.7 8.7   RBC 4.32 4.36   HEMOGLOBIN 14.0 13.8   HEMATOCRIT 43.8 42.8   .4* 98.2*   MCH 32.4 31.7   MCHC 32.0* 32.2*   RDW 48.4 46.5   PLATELETCT 189 181   MPV 11.3 11.3     Recent Labs     09/25/19  0612 09/25/19  0910   SODIUM 141 140   POTASSIUM 3.8 4.1   CHLORIDE 107 110   CO2 20 16*   GLUCOSE 89 140*   BUN 9 11   CREATININE 0.81 0.80   CALCIUM 9.6 9.7                   Imaging  DX-NECK FOR SOFT TISSUE   Final Result    Examination limited by patient body habitus.   No prevertebral/retropharyngeal edema is identified.         DX-CHEST-PORTABLE (1 VIEW)   Final Result      No acute cardiopulmonary process is seen.           Assessment/Plan  * Chronic respiratory failure with hypoxia, on home oxygen therapy (HCC)- (present on admission)  Assessment & Plan  Patient is maintained on montelukast and Spiriva  She has been on high flow oxygen  O2 and respiratory protocols  Pulmonology consulted  Continue 10 mg of prednisone    Controlled type 2 diabetes mellitus without complication, without long-term current use of insulin ([facility])- (present on admission)  Assessment & Plan  The patient's last A1c 1 month ago was 6.0.  Continue her dulaglutide and cover with sliding scale    Stridor  Assessment & Plan  This is been waxing and waning  Speech pathology will perform a FEES to evaluate her swallowing  If her condition worsens, ENT will be consulted.     Optic neuritis  Assessment & Plan  Continue CellCept  Followed by Dr. Yousif    TONYA (obstructive sleep apnea)- (present on admission)  Assessment & Plan  Followed outpatient by pulmonology.  Currently not on CPAP    Acquired  hypothyroidism- (present on admission)  Assessment & Plan  Continue Synthroid    GERD (gastroesophageal reflux disease)- (present on admission)  Assessment & Plan  Continue H2    Morbidly obese (HCC)- (present on admission)  Assessment & Plan  BMI 47    H/O prior ablation treatment- (present on admission)  Assessment & Plan  Patient is a previous history of SVT and is status post ablation.  Continue Ivabradine    Schizophrenia (HCC)- (present on admission)  Assessment & Plan  Continue Geodon, Buspar, and Prozac    Anxiety- (present on admission)  Assessment & Plan  Continue the patient's outpatient psychiatric medication regimen       VTE prophylaxis: lovenox

## 2019-09-26 NOTE — PROGRESS NOTES
Pt is having stridor and difficulty breathing. Pt appears anxious. Ativan PRN was administered per MAR.  is in place Symbicort was administered per MAR. Pt is on 4 L O2 via nasal cannula. SPO2% is 96%. RT was called for breathing treatment.

## 2019-09-26 NOTE — PROGRESS NOTES
Critical Care Progress Note    Date of admission  9/25/2019    Chief Complaint  28 y.o. female admitted 9/25/2019 with SOB    Hospital Course    28 y.o. Female with asthma, optic neuritis on immunosuppressive therapy with CellCept and steroid taper, psychiatric disorder(anxiety and possible schizophrenia), morbid obesity, hypothyroidism, suspected metabolic disorder, significant debility, mild TONYA with AHI of 13, chronic pain, exertional hypoxia, who presented 9/25/2019 with S OB that started this a.m.  Indicates she had exposure to cigarette smoke and subsequently developed wheezing and shortness of breath.  Denies any current fever, chills, sweats, productive cough, CP, nausea, vomiting, or lower extremity edema.  She attempted to utilize her nebulizers without significant benefit and proceeded to come to the ED.     ED course  Labs with mild AGMA, no leukocytosis or left shift, patient was given DuoNeb's x2 and 1 mg Ativan followed by initiation of heliox.  Imaging did not show obvious abnormality        Interval Problem Update  Reviewed last 24 hour events:   - remains on HFNC   - AAOx4   - sinus tach with activity   - Tm 38.2   - celso diet, BM today   - good UOP on lasix   - no labs   - FEES vs ENT consult    Review of Systems  Review of Systems   Constitutional: Negative for chills, fever and malaise/fatigue.   HENT: Negative for sinus pain.    Eyes: Negative for blurred vision.   Respiratory: Positive for cough, shortness of breath and wheezing. Negative for hemoptysis and sputum production.    Cardiovascular: Negative for chest pain and leg swelling.   Gastrointestinal: Negative for abdominal pain, nausea and vomiting.   Genitourinary: Negative for dysuria.   Musculoskeletal: Negative for back pain.   Skin: Negative for rash.   Neurological: Negative for dizziness.   Endo/Heme/Allergies: Does not bruise/bleed easily.   Psychiatric/Behavioral: Negative for depression. The patient is nervous/anxious.    All  other systems reviewed and are negative.       Vital Signs for last 24 hours   Temp:  [36.1 °C (97 °F)-36.4 °C (97.5 °F)] 36.1 °C (97 °F)  Pulse:  [] 20  Resp:  [13-36] 13  BP: (108-163)/() 138/89  SpO2:  [91 %-98 %] 96 %    Respiratory Information for the last 24 hours   HFNC 20 lpm, 30% (4 lpm n/c baseline)    Physical Exam   Physical Exam   Constitutional: She is oriented to person, place, and time. She appears well-developed and well-nourished. No distress.   Obese, anxious   HENT:   Head: Normocephalic and atraumatic.   Nose: Nose normal.   Mouth/Throat: Oropharynx is clear and moist.   Eyes: Pupils are equal, round, and reactive to light. Conjunctivae are normal. No scleral icterus.   Neck: Neck supple. No JVD present. No tracheal deviation present.   Cardiovascular: Regular rhythm.  No extrasystoles are present. Tachycardia present. PMI is not displaced. Exam reveals no distant heart sounds and no decreased pulses.   No murmur heard.  Pulmonary/Chest: Effort normal. No accessory muscle usage or stridor. No tachypnea. No respiratory distress. She has no decreased breath sounds. She has no wheezes. She has no rhonchi.   Upper airway inspiratory wheeze with exertion that goes away with talking and being reassured   Abdominal: Soft. Bowel sounds are normal. She exhibits no distension. There is no tenderness. There is no rebound.   Musculoskeletal: She exhibits no edema or tenderness.   Neurological: She is alert and oriented to person, place, and time. No cranial nerve deficit. She exhibits normal muscle tone.   Skin: Skin is warm and dry. No pallor.   Psychiatric: Her behavior is normal. Thought content normal.   anxious   Nursing note and vitals reviewed.      Medications  Current Facility-Administered Medications   Medication Dose Route Frequency Provider Last Rate Last Dose   • ziprasidone (GEODON) capsule 80 mg  80 mg Oral BID Ruben Renee D.O.   80 mg at 09/26/19 0527   • traZODone  (DESYREL) tablet 100 mg  100 mg Oral QHS Ruben Renee, D.O.   100 mg at 09/25/19 2058   • tiotropium (SPIRIVA) 18 MCG inhalation capsule 1 Cap  1 Cap Inhalation DAILY Ruben Renee, D.O.   Stopped at 09/26/19 0600   • sodium bicarbonate tablet 650 mg  650 mg Oral BID Ruben Renee D.O.   650 mg at 09/26/19 0526   • mycophenolate (CELLCEPT) capsule 1,000 mg  1,000 mg Oral BID Ruben Renee, D.O.   1,000 mg at 09/26/19 0529   • montelukast (SINGULAIR) tablet 10 mg  10 mg Oral DAILY Ruben Renee, D.O.   Stopped at 09/26/19 0600   • methocarbamol (ROBAXIN) tablet 750 mg  750 mg Oral TID Ruben Renee, D.O.   750 mg at 09/26/19 0719   • pregabalin (LYRICA) capsule 300 mg  300 mg Oral BID Ruben Renee, D.O.   300 mg at 09/26/19 0544   • levothyroxine (SYNTHROID) tablet 75 mcg  75 mcg Oral AM ES Ruben Renee, D.O.   75 mcg at 09/26/19 0525   • gabapentin (NEURONTIN) capsule 300 mg  300 mg Oral 4X/DAY Ruben Renee, D.O.   300 mg at 09/25/19 2058   • furosemide (LASIX) tablet 80 mg  80 mg Oral DAILY Ruben Renee, D.O.   80 mg at 09/26/19 0524   • folic acid (FOLVITE) tablet 1 mg  1 mg Oral DAILY Ruben Renee, D.O.   1 mg at 09/26/19 0525   • FLUoxetine (PROZAC) capsule 40 mg  40 mg Oral DAILY Ruben Renee, D.O.   40 mg at 09/26/19 0526   • busPIRone (BUSPAR) tablet 10 mg  10 mg Oral BID Ruben Renee, D.O.   10 mg at 09/26/19 0527   • budesonide-formoterol (SYMBICORT) 160-4.5 MCG/ACT inhaler 2 Puff  2 Puff Inhalation BID ABBY HardinO.   2 Puff at 09/26/19 0531   • aspirin EC (ECOTRIN) tablet 81 mg  81 mg Oral DAILY ANTONI Hardin.O.   81 mg at 09/26/19 0526   • senna-docusate (PERICOLACE or SENOKOT S) 8.6-50 MG per tablet 2 Tab  2 Tab Oral BID ABBY HardinO.   2 Tab at 09/26/19 0522    And   • polyethylene glycol/lytes (MIRALAX) PACKET 1 Packet  1 Packet Oral  QDAY PRN ANTONI Hardin.O.        And   • magnesium hydroxide (MILK OF MAGNESIA) suspension 30 mL  30 mL Oral QDAY PRN Ruben Renee D.O.        And   • bisacodyl (DULCOLAX) suppository 10 mg  10 mg Rectal QDAY PRN Ruben Renee D.O.       • Respiratory Care per Protocol   Nebulization Continuous RT ANTONI Hardin.O.       • enoxaparin (LOVENOX) inj 40 mg  40 mg Subcutaneous DAILY Ruben Renee, D.O.   40 mg at 09/26/19 0531   • acetaminophen (TYLENOL) tablet 650 mg  650 mg Oral Q6HRS PRN Ruben Renee D.O.       • tramadol (ULTRAM) 50 MG tablet 50 mg  50 mg Oral Q6HRS PRN Ruben Renee D.O.   50 mg at 09/26/19 0350   • predniSONE (DELTASONE) tablet 20 mg  20 mg Oral DAILY Ruben Renee D.O.   20 mg at 09/26/19 0527   • ipratropium-albuterol (DUONEB) nebulizer solution  3 mL Nebulization 4X/DAY (RT) Ruben Renee D.O.   3 mL at 09/26/19 0616   • famotidine (PEPCID) tablet 20 mg  20 mg Oral BID Ruben Renee D.O.   20 mg at 09/26/19 0526   • insulin regular (HUMULIN R) injection 2-9 Units  2-9 Units Subcutaneous 4X/DAY ACHANTONI Segovia.O.   Stopped at 09/25/19 2100    And   • glucose 4 g chewable tablet 16 g  16 g Oral Q15 MIN PRN ANTONI Hardin.O.        And   • DEXTROSE 10% BOLUS 250 mL  250 mL Intravenous Q15 MIN PRN ANTONI Hardin.O.       • ondansetron (ZOFRAN) syringe/vial injection 4 mg  4 mg Intravenous Q4HRS PRN Emery Cao M.D.   4 mg at 09/26/19 0719   • Ivabradine HCl TABS 7.5 mg  7.5 mg Oral BID Ruben Renee D.O.   7.5 mg at 09/26/19 0600       Fluids    Intake/Output Summary (Last 24 hours) at 9/26/2019 0722  Last data filed at 9/26/2019 0600  Gross per 24 hour   Intake 1000 ml   Output 2350 ml   Net -1350 ml       Laboratory          Recent Labs     09/25/19  0612 09/25/19  0910   SODIUM 141 140   POTASSIUM 3.8 4.1   CHLORIDE 107 110   CO2 20 16*   BUN  9 11   CREATININE 0.81 0.80   MAGNESIUM 2.0  --    PHOSPHORUS 2.8  --    CALCIUM 9.6 9.7     Recent Labs     09/25/19  0612 09/25/19  0910   ALTSGPT 41 43   ASTSGOT 15 29   ALKPHOSPHAT 33 32   TBILIRUBIN 0.5 0.4   GLUCOSE 89 140*     Recent Labs     09/25/19  0612 09/25/19  0910   WBC 7.7 8.7   NEUTSPOLYS 48.20 58.30   LYMPHOCYTES 42.00* 33.20   MONOCYTES 5.80 5.20   EOSINOPHILS 2.70 2.30   BASOPHILS 0.40 0.30   ASTSGOT 15 29   ALTSGPT 41 43   ALKPHOSPHAT 33 32   TBILIRUBIN 0.5 0.4     Recent Labs     09/25/19  0612 09/25/19  0910   RBC 4.32 4.36   HEMOGLOBIN 14.0 13.8   HEMATOCRIT 43.8 42.8   PLATELETCT 189 181       Imaging  X-Ray:  I have personally reviewed the images and compared with prior images. and My impression is: No acute cardiopulmonary process.  Neck soft tissue x-ray within normal limits as well.    Assessment/Plan  * Chronic respiratory failure with hypoxia, on home oxygen therapy (HCC)- (present on admission)  Assessment & Plan  Likely due to vocal cord dysfunction with underlying asthma and pickwickian syndrome  SLP to perform fees to evaluate vocal cord function  Wean off high flow and continue to titrate oxygen to home 4 L/min  RT/O2 protocol  Encourage incentive spirometry, out of bed to chair    Controlled type 2 diabetes mellitus without complication, without long-term current use of insulin (HCC)- (present on admission)  Assessment & Plan  Continue insulin sliding scale  Diabetic diet    Stridor  Assessment & Plan  Likely due to vocal cord dysfunction  Fees with possible need for ENT consultation  PRN Ativan, reassurance    Optic neuritis  Assessment & Plan  On CellCept and prednisone taper    TONYA (obstructive sleep apnea)- (present on admission)  Assessment & Plan  Mild per most recent sleep study  Minimize narcotic and sedating medications    Acquired hypothyroidism- (present on admission)  Assessment & Plan  Synthroid 75 mcg oral daily    GERD (gastroesophageal reflux disease)- (present on  admission)  Assessment & Plan  Keep head of bed upright  Pepcid  GERD education provided    Morbidly obese (HCC)- (present on admission)  Assessment & Plan  Nutritionist consultation  Encourage behavioral and dietary modification for weight loss    Anxiety- (present on admission)  Assessment & Plan  Continue home regimen       VTE:  Lovenox  Ulcer: Not Indicated  Lines: None    I have performed a physical exam and reviewed and updated ROS and Plan today (9/26/2019). In review of yesterday's note (9/25/2019), there are no changes except as documented above.     Discussed patient condition and risk of morbidity and/or mortality with Hospitalist, RN, RT, Therapies, Pharmacy, Charge nurse / hot rounds and Patient

## 2019-09-27 LAB
GLUCOSE BLD-MCNC: 119 MG/DL (ref 65–99)
GLUCOSE BLD-MCNC: 124 MG/DL (ref 65–99)
GLUCOSE BLD-MCNC: 127 MG/DL (ref 65–99)
GLUCOSE BLD-MCNC: 159 MG/DL (ref 65–99)

## 2019-09-27 PROCEDURE — 700111 HCHG RX REV CODE 636 W/ 250 OVERRIDE (IP): Performed by: HOSPITALIST

## 2019-09-27 PROCEDURE — 700102 HCHG RX REV CODE 250 W/ 637 OVERRIDE(OP): Performed by: HOSPITALIST

## 2019-09-27 PROCEDURE — 87086 URINE CULTURE/COLONY COUNT: CPT

## 2019-09-27 PROCEDURE — 82962 GLUCOSE BLOOD TEST: CPT | Mod: 91

## 2019-09-27 PROCEDURE — 99233 SBSQ HOSP IP/OBS HIGH 50: CPT | Performed by: HOSPITALIST

## 2019-09-27 PROCEDURE — 770006 HCHG ROOM/CARE - MED/SURG/GYN SEMI*

## 2019-09-27 PROCEDURE — 302151 K-PAD 14X20: Performed by: HOSPITALIST

## 2019-09-27 PROCEDURE — 700101 HCHG RX REV CODE 250: Performed by: HOSPITALIST

## 2019-09-27 PROCEDURE — A9270 NON-COVERED ITEM OR SERVICE: HCPCS | Performed by: HOSPITALIST

## 2019-09-27 PROCEDURE — 700111 HCHG RX REV CODE 636 W/ 250 OVERRIDE (IP): Performed by: INTERNAL MEDICINE

## 2019-09-27 PROCEDURE — 94640 AIRWAY INHALATION TREATMENT: CPT

## 2019-09-27 PROCEDURE — 94760 N-INVAS EAR/PLS OXIMETRY 1: CPT

## 2019-09-27 RX ORDER — IPRATROPIUM BROMIDE AND ALBUTEROL SULFATE 2.5; .5 MG/3ML; MG/3ML
3 SOLUTION RESPIRATORY (INHALATION)
Status: DISCONTINUED | OUTPATIENT
Start: 2019-09-27 | End: 2019-09-30

## 2019-09-27 RX ADMIN — IPRATROPIUM BROMIDE AND ALBUTEROL SULFATE 3 ML: .5; 3 SOLUTION RESPIRATORY (INHALATION) at 17:43

## 2019-09-27 RX ADMIN — METHOCARBAMOL TABLETS 750 MG: 750 TABLET, COATED ORAL at 14:17

## 2019-09-27 RX ADMIN — PREGABALIN 300 MG: 150 CAPSULE ORAL at 05:26

## 2019-09-27 RX ADMIN — MONTELUKAST 10 MG: 10 TABLET, FILM COATED ORAL at 05:26

## 2019-09-27 RX ADMIN — TRAZODONE HYDROCHLORIDE 100 MG: 50 TABLET ORAL at 21:26

## 2019-09-27 RX ADMIN — GABAPENTIN 300 MG: 300 CAPSULE ORAL at 14:17

## 2019-09-27 RX ADMIN — IPRATROPIUM BROMIDE AND ALBUTEROL SULFATE 3 ML: .5; 3 SOLUTION RESPIRATORY (INHALATION) at 04:12

## 2019-09-27 RX ADMIN — IPRATROPIUM BROMIDE AND ALBUTEROL SULFATE 3 ML: .5; 3 SOLUTION RESPIRATORY (INHALATION) at 06:23

## 2019-09-27 RX ADMIN — METHOCARBAMOL TABLETS 750 MG: 750 TABLET, COATED ORAL at 10:00

## 2019-09-27 RX ADMIN — GABAPENTIN 300 MG: 300 CAPSULE ORAL at 17:42

## 2019-09-27 RX ADMIN — ZIPRASIDONE HYDROCHLORIDE 80 MG: 80 CAPSULE ORAL at 05:28

## 2019-09-27 RX ADMIN — TRAMADOL HYDROCHLORIDE 50 MG: 50 TABLET ORAL at 08:20

## 2019-09-27 RX ADMIN — TRAMADOL HYDROCHLORIDE 50 MG: 50 TABLET ORAL at 22:51

## 2019-09-27 RX ADMIN — GABAPENTIN 300 MG: 300 CAPSULE ORAL at 21:26

## 2019-09-27 RX ADMIN — BUDESONIDE AND FORMOTEROL FUMARATE DIHYDRATE 2 PUFF: 160; 4.5 AEROSOL RESPIRATORY (INHALATION) at 05:28

## 2019-09-27 RX ADMIN — PREGABALIN 300 MG: 150 CAPSULE ORAL at 17:42

## 2019-09-27 RX ADMIN — SODIUM BICARBONATE 650 MG: 650 TABLET ORAL at 17:43

## 2019-09-27 RX ADMIN — BUDESONIDE AND FORMOTEROL FUMARATE DIHYDRATE 2 PUFF: 160; 4.5 AEROSOL RESPIRATORY (INHALATION) at 21:24

## 2019-09-27 RX ADMIN — ENOXAPARIN SODIUM 40 MG: 100 INJECTION SUBCUTANEOUS at 05:28

## 2019-09-27 RX ADMIN — FAMOTIDINE 20 MG: 20 TABLET ORAL at 17:43

## 2019-09-27 RX ADMIN — FAMOTIDINE 20 MG: 20 TABLET ORAL at 05:25

## 2019-09-27 RX ADMIN — SENNOSIDES, DOCUSATE SODIUM 2 TABLET: 50; 8.6 TABLET, FILM COATED ORAL at 05:27

## 2019-09-27 RX ADMIN — INSULIN HUMAN 2 UNITS: 100 INJECTION, SOLUTION PARENTERAL at 12:26

## 2019-09-27 RX ADMIN — ASPIRIN 81 MG: 81 TABLET, COATED ORAL at 05:25

## 2019-09-27 RX ADMIN — SENNOSIDES, DOCUSATE SODIUM 2 TABLET: 50; 8.6 TABLET, FILM COATED ORAL at 17:43

## 2019-09-27 RX ADMIN — METHOCARBAMOL TABLETS 750 MG: 750 TABLET, COATED ORAL at 21:25

## 2019-09-27 RX ADMIN — ONDANSETRON 4 MG: 2 INJECTION INTRAMUSCULAR; INTRAVENOUS at 05:22

## 2019-09-27 RX ADMIN — MYCOPHENOLATE MOFETIL 1000 MG: 250 CAPSULE ORAL at 17:42

## 2019-09-27 RX ADMIN — TRAMADOL HYDROCHLORIDE 50 MG: 50 TABLET ORAL at 14:20

## 2019-09-27 RX ADMIN — MYCOPHENOLATE MOFETIL 1000 MG: 250 CAPSULE ORAL at 05:27

## 2019-09-27 RX ADMIN — FUROSEMIDE 80 MG: 40 TABLET ORAL at 05:25

## 2019-09-27 RX ADMIN — ENOXAPARIN SODIUM 40 MG: 100 INJECTION SUBCUTANEOUS at 17:41

## 2019-09-27 RX ADMIN — FLUOXETINE HYDROCHLORIDE 40 MG: 20 CAPSULE ORAL at 05:25

## 2019-09-27 RX ADMIN — PREDNISONE 20 MG: 20 TABLET ORAL at 05:26

## 2019-09-27 RX ADMIN — SODIUM BICARBONATE 650 MG: 650 TABLET ORAL at 05:26

## 2019-09-27 RX ADMIN — TRAMADOL HYDROCHLORIDE 50 MG: 50 TABLET ORAL at 00:12

## 2019-09-27 RX ADMIN — ZIPRASIDONE HYDROCHLORIDE 80 MG: 80 CAPSULE ORAL at 17:42

## 2019-09-27 RX ADMIN — LORAZEPAM 1 MG: 1 TABLET ORAL at 12:22

## 2019-09-27 RX ADMIN — LORAZEPAM 1 MG: 1 TABLET ORAL at 04:02

## 2019-09-27 RX ADMIN — FOLIC ACID 1 MG: 1 TABLET ORAL at 05:25

## 2019-09-27 RX ADMIN — GABAPENTIN 300 MG: 300 CAPSULE ORAL at 10:00

## 2019-09-27 RX ADMIN — BUSPIRONE HYDROCHLORIDE 10 MG: 10 TABLET ORAL at 17:42

## 2019-09-27 RX ADMIN — IPRATROPIUM BROMIDE AND ALBUTEROL SULFATE 3 ML: .5; 3 SOLUTION RESPIRATORY (INHALATION) at 10:37

## 2019-09-27 RX ADMIN — TIOTROPIUM BROMIDE 1 CAPSULE: 18 CAPSULE ORAL; RESPIRATORY (INHALATION) at 05:28

## 2019-09-27 RX ADMIN — IPRATROPIUM BROMIDE AND ALBUTEROL SULFATE 3 ML: .5; 3 SOLUTION RESPIRATORY (INHALATION) at 14:08

## 2019-09-27 RX ADMIN — BUSPIRONE HYDROCHLORIDE 10 MG: 10 TABLET ORAL at 05:25

## 2019-09-27 RX ADMIN — VITAMIN D, TAB 1000IU (100/BT) 1000 UNITS: 25 TAB at 12:22

## 2019-09-27 RX ADMIN — LEVOTHYROXINE SODIUM 75 MCG: 75 TABLET ORAL at 05:26

## 2019-09-27 RX ADMIN — IPRATROPIUM BROMIDE AND ALBUTEROL SULFATE 3 ML: .5; 3 SOLUTION RESPIRATORY (INHALATION) at 22:16

## 2019-09-27 ASSESSMENT — ENCOUNTER SYMPTOMS
CHILLS: 0
BACK PAIN: 1
TINGLING: 0
HEADACHES: 0
NERVOUS/ANXIOUS: 1
BLURRED VISION: 0
CONSTIPATION: 0
FOCAL WEAKNESS: 0
MYALGIAS: 1
FEVER: 0
NAUSEA: 0
ABDOMINAL PAIN: 1
PHOTOPHOBIA: 0
TREMORS: 0
DIARRHEA: 0
WHEEZING: 1
NECK PAIN: 1
SHORTNESS OF BREATH: 1
DEPRESSION: 0

## 2019-09-27 NOTE — CONSULTS
"Pulmonary Consultation       Patient ID:   Name:             Kristin Balderrama   YOB: 1990  Age:                 28 y.o.  female   MRN:               [id number]                                                  Reason of Consult:  Stridor    Requesting physician:  Dr Sosa    History of Present Illness:    28-year-old female with a past medical history of optic neuritis as well as asthma, patient was recently discharged from hospital with an asthma exacerbation and was on a prednisone taper.  On a bus patient was exposed to smoke and developed stridor and wheezing.  Patient was admitted to the ICU and noted improvement with breathing treatments and increased dose of prednisone.    Last night patient mentions having a episode of stridor, she mentions that her stridor is not related to her anxiety attacks.  She attributes it to something in the air that causes the stridor.  Patient underwent an F EES today that did not note any overt vocal cord dysfunction.  We were consulted to evaluate stridor and asthma.    ROS:  Complete ROS was done and was negative except what mentioned in HPI     Past Medical History:  Past Medical History:   Diagnosis Date   • Abdominal pain    • Anginal syndrome     random chest pain especially with tachycardia   • Apnea, sleep    • Arrhythmia     \"sinus tachycardia\", cariologist, Dr. Kumar; ablation 2/2016   • Arthritis     osteo   • ASTHMA     when around smoke   • Atrial fibrillation (HCC)    • Back pain    • Borderline personality disorder (HCC)    • Breath shortness     with tachycardia   • Bronchitis    • Cardiac arrhythmia    • Chickenpox    • Chronic UTI 9/18/2010   • Cough    • Daytime sleepiness    • Depression    • Diabetes (HCC)    • Diarrhea    • Disorder of thyroid    • Fall    • Fatigue    • Frequent headaches    • Gasping for breath    • Gynecological disorder     PCOS   • Headache(784.0)    • Heart burn    • History of falling    • Hypertension    • " "Indigestion    • Migraine    • 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HCC)    • 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HCC)    • Nausea    • Obesity    • Pain 08-15-12    back, 7/10   • Painful joint    • PCOS (polycystic ovarian syndrome)    • Pneumonia 2012   • Psychosis (HCC)    • Renal disorder     \"kidney disease, stage 1\" nephrologist, Dr. 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   • Urinary bladder disorder     Suprapubic cath   • Urinary incontinence    • Weakness    • Wears glasses        Past surgical history:  Past Surgical History:   Procedure Laterality Date   • MUSCLE BIOPSY Right 1/26/2017    Procedure: MUSCLE BIOPSY - THIGH;  Surgeon: Isidro Vigil M.D.;  Location: [facility];  Service:    • GASTROSCOPY WITH BALLOON DILATATION N/A 1/4/2017    Procedure: GASTROSCOPY WITH DILATATION;  Surgeon: Torres Vargas M.D.;  Location: [facility];  Service:    • BOWEL STIMULATOR INSERTION  7/13/2016    Procedure: BOWEL STIMULATOR INSERTION FOR PERMANENT INTERSTIM SACRAL IMPLANT;  Surgeon: Joe Noyola M.D.;  Location: [facility];  Service:    • RECOVERY  1/27/2016    Procedure: CATH LAB EP STUDY WITH SINUS NODE MODIFICATION LA;  Surgeon: Recoveryonly Surgery;  Location: SURGERY PRE-[facility] PROC [facility];  Service:    • OTHER CARDIAC SURGERY  1/2016    cardiac ablation   • NEURO DEST FACET L/S W/IG SNGL  4/21/2015    Performed by Reza Tabor at [facility]   • LUMBAR FUSION ANTERIOR  8/21/2012    Performed by SUSIE SAWANT at [facility]   • ALYSSA BY LAPAROSCOPY  8/29/2010    Performed by SHAYY JOHNS at [facility]   • LAMINOTOMY     • OTHER ABDOMINAL SURGERY     • TONSILLECTOMY      tonsillectomy       Family History:  Family " History   Problem Relation Age of Onset   • Hypertension Mother    • Sleep Apnea Mother    • Heart Disease Mother    • Other Mother         hypothryod   • Hypertension Maternal Uncle    • Heart Disease Maternal Grandmother    • Hypertension Maternal Grandmother    • No Known Problems Sister    • Other Sister         Narcolepsy;fibromyalsia;bone;nerve   • Genitourinary () Problems Sister         endometriosis       Hospital Medications:    Current Facility-Administered Medications:   •  ipratropium-albuterol (DUONEB) nebulizer solution, 3 mL, Nebulization, Q4HRS (RT), Ruben Renee D.O., 3 mL at 09/27/19 1408  •  vitamin D (cholecalciferol) tablet 1,000 Units, 1,000 Units, Oral, DAILY, Ruth Ann Sosa M.D., 1,000 Units at 09/27/19 1222  •  enoxaparin (LOVENOX) inj 40 mg, 40 mg, Subcutaneous, Q12HRS, Ruth Ann Sosa M.D.  •  LORazepam (ATIVAN) tablet 1 mg, 1 mg, Oral, TID PRN, Ruth Ann Sosa M.D., 1 mg at 09/27/19 1222  •  ipratropium-albuterol (DUONEB) nebulizer solution, 3 mL, Nebulization, Q4H PRN (RT), Ruben Renee D.O., 3 mL at 09/27/19 0412  •  ziprasidone (GEODON) capsule 80 mg, 80 mg, Oral, BID, Ruben Renee D.O., 80 mg at 09/27/19 0528  •  traZODone (DESYREL) tablet 100 mg, 100 mg, Oral, QHS, Ruben Renee D.O., 100 mg at 09/26/19 2144  •  tiotropium (SPIRIVA) 18 MCG inhalation capsule 1 Cap, 1 Cap, Inhalation, DAILY, Ruben Renee D.O., 1 Cap at 09/27/19 0528  •  sodium bicarbonate tablet 650 mg, 650 mg, Oral, BID, Ruben Renee D.O., 650 mg at 09/27/19 0526  •  mycophenolate (CELLCEPT) capsule 1,000 mg, 1,000 mg, Oral, BID, Ruben Renee D.O., 1,000 mg at 09/27/19 0527  •  montelukast (SINGULAIR) tablet 10 mg, 10 mg, Oral, DAILY, Ruben Renee D.O., 10 mg at 09/27/19 0526  •  methocarbamol (ROBAXIN) tablet 750 mg, 750 mg, Oral, TID, Ruben Renee D.O., 750 mg at 09/27/19 1417  •  pregabalin (LYRICA) capsule 300 mg,  300 mg, Oral, BID, Ruben Renee D.O., 300 mg at 09/27/19 0526  •  levothyroxine (SYNTHROID) tablet 75 mcg, 75 mcg, Oral, AM ES, Ruben Renee D.O., 75 mcg at 09/27/19 0526  •  gabapentin (NEURONTIN) capsule 300 mg, 300 mg, Oral, 4X/DAY, Ruben Renee D.O., 300 mg at 09/27/19 1417  •  furosemide (LASIX) tablet 80 mg, 80 mg, Oral, DAILY, Ruben Renee D.O., 80 mg at 09/27/19 0525  •  folic acid (FOLVITE) tablet 1 mg, 1 mg, Oral, DAILY, Ruben Renee D.O., 1 mg at 09/27/19 0525  •  FLUoxetine (PROZAC) capsule 40 mg, 40 mg, Oral, DAILY, Ruben Renee D.O., 40 mg at 09/27/19 0525  •  busPIRone (BUSPAR) tablet 10 mg, 10 mg, Oral, BID, Ruben Renee D.O., 10 mg at 09/27/19 0525  •  budesonide-formoterol (SYMBICORT) 160-4.5 MCG/ACT inhaler 2 Puff, 2 Puff, Inhalation, BID, ANTONI Hardin.O., 2 Puff at 09/27/19 0528  •  aspirin EC (ECOTRIN) tablet 81 mg, 81 mg, Oral, DAILY, Ruben Rneee D.O., 81 mg at 09/27/19 0525  •  senna-docusate (PERICOLACE or SENOKOT S) 8.6-50 MG per tablet 2 Tab, 2 Tab, Oral, BID, 2 Tab at 09/27/19 0527 **AND** polyethylene glycol/lytes (MIRALAX) PACKET 1 Packet, 1 Packet, Oral, QDAY PRN **AND** magnesium hydroxide (MILK OF MAGNESIA) suspension 30 mL, 30 mL, Oral, QDAY PRN **AND** bisacodyl (DULCOLAX) suppository 10 mg, 10 mg, Rectal, QDAY PRN, ABBY HardinO.  •  Respiratory Care per Protocol, , Nebulization, Continuous RT, Ruben Renee D.O.  •  acetaminophen (TYLENOL) tablet 650 mg, 650 mg, Oral, Q6HRS PRN, ANTONI Hardin.O.  •  tramadol (ULTRAM) 50 MG tablet 50 mg, 50 mg, Oral, Q6HRS PRN, ANTONI Hardin.O., 50 mg at 09/27/19 1420  •  predniSONE (DELTASONE) tablet 20 mg, 20 mg, Oral, DAILY, ANTONI Hardin.O., 20 mg at 09/27/19 0526  •  famotidine (PEPCID) tablet 20 mg, 20 mg, Oral, BID, ANTONI Hardin.O., 20 mg at 09/27/19 0525  •  insulin  regular (HUMULIN R) injection 2-9 Units, 2-9 Units, Subcutaneous, 4X/DAY ACHS, 2 Units at 09/27/19 1226 **AND** Accu-Chek ACHS, , , Q AC AND BEDTIME(S) **AND** NOTIFY MD and PharmD, , , Once **AND** glucose 4 g chewable tablet 16 g, 16 g, Oral, Q15 MIN PRN **AND** DEXTROSE 10% BOLUS 250 mL, 250 mL, Intravenous, Q15 MIN PRN, Ruben Renee D.O.  •  ondansetron (ZOFRAN) syringe/vial injection 4 mg, 4 mg, Intravenous, Q4HRS PRN, Emery Cao M.D., 4 mg at 09/27/19 0522  •  Ivabradine HCl TABS 7.5 mg, 7.5 mg, Oral, BID, Ruben Renee D.O., 7.5 mg at 09/27/19 0526    Current Outpatient Medications:  Medications Prior to Admission   Medication Sig Dispense Refill Last Dose   • lactulose 20 GM/30ML Solution Take 15 mL by mouth 2 times a day as needed. Indications: Chronic Constipation   9/24/2019 at Unknown time   • traZODone (DESYREL) 100 MG Tab Take 100 mg by mouth every morning.   9/24/2019 at 1100   • predniSONE (DELTASONE) 10 MG Tab Take 10 mg by mouth every morning.   9/25/2019 at 0830   • cephALEXin (KEFLEX) 500 MG Cap Take 500 mg by mouth 3 times a day. For 2 weeks   possisble start date: 9/17/19 9/25/2019 at 0830   • ranitidine (ZANTAC) 300 MG tablet Take 300 mg by mouth every morning.   9/25/2019 at 0830   • diphenhydrAMINE-APAP, sleep, (TYLENOL PM EXTRA STRENGTH)  MG Tab Take 2 Tabs by mouth every morning.   9/24/2019 at 1100   • Diclofenac Sodium (VOLTAREN) 1 % Gel Apply 1 Application to skin as directed 4 times a day as needed (on wrists, back, ankles, and feet).   9/24/2019 at Unknown time   • busPIRone (BUSPAR) 10 MG Tab tablet Take 1 Tab by mouth 2 times a day. 60 Tab 1 9/25/2019 at 0100   • fluoxetine (PROZAC) 40 MG capsule Take 1 Cap by mouth every day. 30 Cap 1 9/25/2019 at 0100   • ziprasidone (GEODON) 80 MG Cap Take 1 Cap by mouth 2 Times a Day. 60 Cap 1 9/25/2019 at 0100   • montelukast (SINGULAIR) 10 MG Tab Take 1 Tab by mouth every day. 90 Tab 3 9/24/2019 at 2100   •  tiotropium (SPIRIVA) 18 MCG Cap Inhale 1 Cap by mouth every day. 90 Cap 3 9/24/2019 at 1200   • Folic Acid 0.8 MG Cap Take 0.8 mg by mouth every day.   9/25/2019 at 0830   • Ivabradine HCl (CORLANOR) 7.5 MG Tab Take 7.5 mg by mouth 2 Times a Day.   9/25/2019 at 0100   • ipratropium-albuterol (DUONEB) 0.5-2.5 (3) MG/3ML nebulizer solution 3 mL by Nebulization route every 6 hours as needed for Shortness of Breath. 60 Bullet 1 9/25/2019 at 0830   • budesonide-formoterol (SYMBICORT) 160-4.5 MCG/ACT Aerosol Inhale 2 Puffs by mouth 2 Times a Day. 1 Inhaler 11 9/24/2019 at Unknown time   • etonogestrel (NEXPLANON) 68 MG Implant implant Inject 1 Each as instructed Once.   unknown at Unknown time   • mycophenolate (CELLCEPT) 500 MG tablet Take 1,000 mg by mouth 2 times a day.   9/25/2019 at 0400   • levothyroxine (SYNTHROID) 75 MCG Tab Take 1 Tab by mouth Every morning on an empty stomach. 90 Tab 1 9/25/2019 at 0400   • gabapentin (NEURONTIN) 300 MG Cap Take 300 mg by mouth 4 times a day.   9/25/2019 at 0100   • Dulaglutide (TRULICITY) 0.75 MG/0.5ML Solution Pen-injector Inject 1.5 mg as instructed every Friday.   9/20/2019 at Unknown time   • ondansetron (ZOFRAN) 4 MG Tab tablet Take 1 Tab by mouth every four hours as needed for Nausea/Vomiting. 20 Tab 3 9/24/2019 at Unknown time   • sodium bicarbonate 325 MG Tab Take 650 mg by mouth 2 Times a Day.   9/25/2019 at 0100   • albuterol 108 (90 Base) MCG/ACT Aero Soln inhalation aerosol Inhale 2 Puffs by mouth every 6 hours as needed for Shortness of Breath.   9/25/2019 at Unknown time   • Melatonin 5 MG Tab Take 5 mg by mouth every morning.   9/24/2019 at 1100   • furosemide (LASIX) 80 MG Tab Take 80 mg by mouth every day.   9/25/2019 at 0100   • methocarbamol (ROBAXIN) 750 MG Tab Take 750 mg by mouth 3 times a day.   9/25/2019 at 0100   • LYRICA 300 MG capsule Take 300 mg by mouth 2 times a day.   9/25/2019 at 0100   • aspirin EC (ECOTRIN) 81 MG Tablet Delayed Response Take 1  "Tab by mouth every day. 30 Tab 6 9/25/2019 at 0800       Medication Allergy:  Allergies   Allergen Reactions   • Cefdinir Shortness of Breath and Itching     Tolerated 1/18/17  Tolerates ceftriaxone    • Depakote [Divalproex Sodium] Unspecified     Muscle spasms/muscle pain and weakness     • Doxycycline Anaphylaxis and Vomiting     RXN=unknown   • Amitriptyline Unspecified     Headaches     • Aripiprazole [Abilify] Unspecified     Headaches/muscle twitching     • Ciprofloxacin Rash     Pt states \"I get a rash\".     • Clindamycin Nausea     Even with food     • Ees [Erythromycin] Vomiting and Nausea   • Flagyl [Metronidazole Hcl] Unspecified     \"eye problems\"     • Flomax [Tamsulosin Hydrochloride] Swelling   • Metformin Unspecified     Increased lactic acid      • Tape Rash     Tears skin off  coban with Tegaderm tape ok intermittently  RXN=ongoing   • Vancomycin Itching     Pt becomes flushed in face and chest.   RXN=7/10/16   • Wound Dressing Adhesive Hives     By pt report   • Cephalexin [Keflex] Rash     Pt states she gets a rash with this medication  Tolerates ceftriaxone   • Divalproex Sodium [Valproic Acid] Rash     .   • Levofloxacin Unspecified     Leg muscle cramps   • Metronidazole Rash     .   • Tamsulosin Hcl              Objective:   Vitals/ General Appearance:   Weight/BMI: Body mass index is 47.62 kg/m².  /76   Pulse (!) 106   Temp 36.3 °C (97.4 °F) (Temporal)   Resp 18   Ht 1.651 m (5' 5\")   Wt (!) 129.8 kg (286 lb 2.5 oz)   SpO2 94%   Vitals:    09/27/19 0623 09/27/19 0710 09/27/19 1038 09/27/19 1408   BP:  131/76     Pulse: 92 (!) 104 (!) 101 (!) 106   Resp: 20 20 18 18   Temp:  36.3 °C (97.4 °F)     TempSrc:  Temporal     SpO2: 95% 94% 93% 94%   Weight:       Height:         Oxygen Therapy:  Pulse Oximetry: 94 %, O2 (LPM): 3, O2 Delivery: Silicone Nasal Cannula    Constitutional: Anxious.  Well developed, Well nourished obese.  HENMT: Hirsutism.  Normocephalic, Atraumatic, " Oropharynx moist mucous membranes, No oral exudates, Nose normal.  No thyromegaly.  Eyes:  EOMI, Conjunctiva normal, No discharge.  Neck:  Normal range of motion, No cervical tenderness,  no JVD.  Cardiovascular:  Normal heart rate, Normal rhythm, No murmurs, No rubs, No gallops.   Extremitites with intact distal pulses, no cyanosis, or edema.  Lungs:  Normal breath sounds, breath sounds clear to auscultation bilaterally,  no crackles, no wheezing.   Abdomen: Bowel sounds normal, Soft, No tenderness, No guarding, No rebound, No masses, No hepatosplenomegaly.  Skin: Warm, Dry, No erythema, No rash, no induration.  Neurologic: Alert & oriented x 3, No focal deficits noted, cranial nerves II through X are grossly intact.  Psychiatric: Anxious.    Labs:  Recent Labs     09/25/19  0612 09/25/19  0910   WBC 7.7 8.7   RBC 4.32 4.36   HEMOGLOBIN 14.0 13.8   HEMATOCRIT 43.8 42.8   .4* 98.2*   MCH 32.4 31.7   MCHC 32.0* 32.2*   RDW 48.4 46.5   PLATELETCT 189 181   MPV 11.3 11.3                 Recent Labs     09/25/19  0612 09/25/19  0910   SODIUM 141 140   POTASSIUM 3.8 4.1   CHLORIDE 107 110   CO2 20 16*   GLUCOSE 89 140*   BUN 9 11     Recent Labs     09/25/19  0612 09/25/19  0910   SODIUM 141 140   POTASSIUM 3.8 4.1   CHLORIDE 107 110   CO2 20 16*   BUN 9 11   CREATININE 0.81 0.80   MAGNESIUM 2.0  --    PHOSPHORUS 2.8  --    CALCIUM 9.6 9.7     No results found. However, due to the size of the patient record, not all encounters were searched. Please check Results Review for a complete set of results.      Imaging:   DX-NECK FOR SOFT TISSUE   Final Result    Examination limited by patient body habitus.   No prevertebral/retropharyngeal edema is identified.         DX-CHEST-PORTABLE (1 VIEW)   Final Result      No acute cardiopulmonary process is seen.              Assessment and Plan:  Principal Problem:    Chronic respiratory failure with hypoxia, on home oxygen therapy (HCC) POA: Yes  Active Problems:     Controlled type 2 diabetes mellitus without complication, without long-term current use of insulin ([facility]) POA: Yes    Lower abdominal pain POA: Unknown    H/O prior ablation treatment POA: Yes      Overview: Sinus node modification    Morbidly obese (HCC) POA: Yes    GERD (gastroesophageal reflux disease) (Chronic) POA: Yes    Weakness of both lower extremities POA: Yes    Acquired hypothyroidism (Chronic) POA: Yes    TONYA (obstructive sleep apnea) POA: Yes    Optic neuritis POA: Unknown    Stridor POA: Unknown    Anxiety POA: Yes    Schizophrenia (HCC) POA: Yes  Resolved Problems:    * No resolved hospital problems. *    This is a patient with chronic respiratory failure with hypoxia secondary to asthma.  Patient presented with asthma attack and developed stridor, at which point we were consulted.  Upon examination it was noted that patient did not have stridor or active wheezing.  Patient underwent an F EES test by speech pathology while patient was having wheezing and stridor and no overt vocal cord abnormalities were noted.  Patient is currently being treated with prednisone, montelukast, Spiriva, Symbicort, and oxygen with respiratory protocols.  Plan  -Continue bronchodilators with [place] cast, and taper prednisone  -Patient will need ENT for this stridor secondary to vocal cord dysfunction, if patient has vocal cord dysfunction patient would benefit from speech pathology.  Patient can be seen by ENT as outpatient.

## 2019-09-27 NOTE — PROGRESS NOTES
Assumed care of pt, discussed plan of care. A&Ox4. On 4L O2 via NC, tolerating well. Expiratory wheezes heard, diminished.  in place. Continent of bowel, bladder. BSC in use. Assist x1, FWW. On diabetic diet, takes pills whole. IV, CDI, SL. Fall precautions in place; bed lowest position, treaded socks at bedside, call light within reach. All needs met at this time.

## 2019-09-27 NOTE — CARE PLAN
Problem: Communication  Goal: The ability to communicate needs accurately and effectively will improve  Outcome: PROGRESSING AS EXPECTED     Problem: Safety  Goal: Will remain free from injury  Outcome: PROGRESSING AS EXPECTED  Goal: Will remain free from falls  Outcome: PROGRESSING AS EXPECTED     Problem: Infection  Goal: Will remain free from infection  Outcome: PROGRESSING AS EXPECTED     Problem: Respiratory:  Goal: Respiratory status will improve  Outcome: PROGRESSING AS EXPECTED     Problem: Pain Management  Goal: Pain level will decrease to patient's comfort goal  Outcome: PROGRESSING AS EXPECTED

## 2019-09-27 NOTE — CARE PLAN
Problem: Communication  Goal: The ability to communicate needs accurately and effectively will improve  Outcome: PROGRESSING AS EXPECTED  Note:   Educated pt on plan of care, scheduled/prn medications. Educated on calling for assistance. Educated on blood sugar checks/SSI. Answered all questions and concerns. Pt able to make needs known.     Problem: Respiratory:  Goal: Respiratory status will improve  Outcome: PROGRESSING AS EXPECTED  Note:   Pt currently on 4L O2, baseline 3-4L. Tolerating well.  in place for monitoring. RT on board, prn neb txts available. Expiratory wheezing. Dyspnea with exertion. CTM respiratory status.

## 2019-09-27 NOTE — PROGRESS NOTES
"2 RN skin check complete with SONYA Rocha.   Devices in place Nasal cannula.  Skin assessed under devices yes.  Confirmed pressure ulcers found on none.  New potential pressure ulcers noted on None. Wound consult placed not yet  The following interventions in place pillows in use for support and positioning. Pt was encouraged to elevate BLE    Pt has a scratch on right lower abdomen, pt stated \"this is from my cat\" Pt has Left breast ulcer. R heel has open wound area on calloused foot area. Per pt report old fissure opened up.   "

## 2019-09-27 NOTE — PROGRESS NOTES
Hospital Medicine Daily Progress Note    Date of Service  9/27/2019    Chief Complaint  28 y.o. female admitted 9/25/2019 with wheezing.    Hospital Course    Ms. Balderrama has a past medical history of optic neuritis as well as asthma.  She was recently discharged from [facility] due to asthma exacerbation and is on a prednisone taper now at 10 mg daily.  She presented to the emergency room with complaints of stridor and wheezing that started the morning of presentation.  Given the degree of her symptoms, she has been admitted to the intensive care unit with critical care consultation.      Interval Problem Update  9/26: Patient seen and evaluated in the intensive care unit. She is on high flow oxygen. She is on high dose lasix. She is scheduled for a FEES today. She is on 3-4 liters oxygen at home. She has been on keflex for folliculitis. I discussed with speech path. She feels much better.     9/27: seen and examined this morning, patient has complains of continued stridor at times which makes her more anxious, also c/o of back, hip and leg pain b/l from laying in bed, also with b/l LE weakness, also c/o suprapubic pain and urinary sx  Stridor comes and goes, she doesn't  notice with with her anxiety attacks    Consultants/Specialty  Critical care.     Code Status  full    Disposition  Medical     Review of Systems  Review of Systems   Constitutional: Positive for malaise/fatigue. Negative for chills and fever.   Eyes: Negative for blurred vision and photophobia.   Respiratory: Positive for shortness of breath and wheezing.         Stridor   Cardiovascular: Negative for chest pain and leg swelling.   Gastrointestinal: Positive for abdominal pain. Negative for constipation, diarrhea and nausea.   Genitourinary: Negative for dysuria, hematuria and urgency.   Musculoskeletal: Positive for back pain, joint pain, myalgias and neck pain.   Neurological: Negative for tingling, tremors, focal weakness and  headaches.   Psychiatric/Behavioral: Negative for depression. The patient is nervous/anxious.         Physical Exam  Temp:  [36.3 °C (97.4 °F)-37.2 °C (99 °F)] 36.3 °C (97.4 °F)  Pulse:  [] 101  Resp:  [17-22] 18  BP: (130-153)/(68-84) 131/76  SpO2:  [93 %-98 %] 93 %    Physical Exam   Constitutional: She is oriented to person, place, and time. No distress.   Obese    HENT:   Head: Normocephalic and atraumatic.   Mouth/Throat: Oropharynx is clear and moist.   Neck: Neck supple. No JVD present. No thyromegaly present.   Cardiovascular: Normal rate and regular rhythm.   No murmur heard.  Pulmonary/Chest: Effort normal and breath sounds normal. She has no wheezes. She has no rales.   Abdominal: Soft. She exhibits no distension. There is tenderness.   Musculoskeletal: Normal range of motion. She exhibits tenderness.   B/l LE strength is 3/5   Lymphadenopathy:     She has no cervical adenopathy.   Neurological: She is alert and oriented to person, place, and time. She exhibits normal muscle tone.   Skin: Skin is warm and dry. She is not diaphoretic. There is pallor.   Psychiatric: She has a normal mood and affect. Her behavior is normal.   Nursing note and vitals reviewed.      Fluids    Intake/Output Summary (Last 24 hours) at 9/27/2019 1129  Last data filed at 9/27/2019 0954  Gross per 24 hour   Intake 2834 ml   Output 350 ml   Net 2484 ml       Laboratory  Recent Labs     09/25/19  0612 09/25/19  0910   WBC 7.7 8.7   RBC 4.32 4.36   HEMOGLOBIN 14.0 13.8   HEMATOCRIT 43.8 42.8   .4* 98.2*   MCH 32.4 31.7   MCHC 32.0* 32.2*   RDW 48.4 46.5   PLATELETCT 189 181   MPV 11.3 11.3     Recent Labs     09/25/19  0612 09/25/19  0910   SODIUM 141 140   POTASSIUM 3.8 4.1   CHLORIDE 107 110   CO2 20 16*   GLUCOSE 89 140*   BUN 9 11   CREATININE 0.81 0.80   CALCIUM 9.6 9.7                   Imaging  DX-NECK FOR SOFT TISSUE   Final Result    Examination limited by patient body habitus.   No prevertebral/retropharyngeal  edema is identified.         DX-CHEST-PORTABLE (1 VIEW)   Final Result      No acute cardiopulmonary process is seen.           Assessment/Plan  * Chronic respiratory failure with hypoxia, on home oxygen therapy ([facility])- (present on admission)  Assessment & Plan  Patient is maintained on montelukast and Spiriva  She has been on high flow oxygen  O2 and respiratory protocols  Pulmonology consulted  Continue 10 mg of prednisone    On her BL 4L o2 now    Controlled type 2 diabetes mellitus without complication, without long-term current use of insulin ([facility])- (present on admission)  Assessment & Plan  The patient's last A1c 1 month ago was 6.0.  Continue her dulaglutide and cover with sliding scale    Stridor  Assessment & Plan  This is been waxing and waning  Speech pathology did a FEES to evaluate her swallowing, consider GI consult, however will obtain barium studies first  If her condition worsens, ENT will be consulted.     Optic neuritis  Assessment & Plan  Continue CellCept  Followed by Dr. Yousif    TONYA (obstructive sleep apnea)- (present on admission)  Assessment & Plan  Followed outpatient by pulmonology.  Currently not on CPAP    Acquired hypothyroidism- (present on admission)  Assessment & Plan  Continue Synthroid    Weakness of both lower extremities- (present on admission)  Assessment & Plan  Morbid obesity vs debility  Will have PT/Ot see her    GERD (gastroesophageal reflux disease)- (present on admission)  Assessment & Plan  Continue H2    Morbidly obese ([facility])- (present on admission)  Assessment & Plan  BMI 47    H/O prior ablation treatment- (present on admission)  Assessment & Plan  Patient is a previous history of SVT and is status post ablation.  Continue Ivabradine    Lower abdominal pain  Assessment & Plan  Suprapubic, UA on 9/25 showed some WBC, will send for cultures  Monitor       Schizophrenia ([facility])- (present on admission)  Assessment & Plan  Continue Geodon, Buspar, and Prozac    Anxiety-  (present on admission)  Assessment & Plan  Continue the patient's outpatient psychiatric medication regimen       VTE prophylaxis: lovenox    Plan of care discussed with patient and nursing in detail today

## 2019-09-27 NOTE — DOCUMENTATION QUERY
"                                                                         [facility]                                                                       Query Response Note      PATIENT:               HARLEY DE LA CRUZ  ACCT #:                  [id number]  MRN:                     [id number]  :                      1990  ADMIT DATE:       2019 9:02 AM  DISCH DATE:          RESPONDING  PROVIDER #:        [id number]           QUERY TEXT:    There is conflicting documentation in the medical record related respiratory failure acuity.      \"Acute on chronic respiratory failure\" is documented as the reason for consultation in the Pulmonary Consult Note dated .      \"Chronic respiratory failure with hypoxia\" is documented in the H&P and Progress Notes.    Based on treatment, clinical findings and risk factors, can this documentation be further clarified?    The patient's Clinical Indicators include:  H&P: Chronic respiratory failure with hypoxia; stridor; DM 2; TONYA; anxiety; recent asthma exacerbation   CXR: No acute cardiopulmonary process   Pulse Oximetry: 91% - 98%   3 L - 20 L per high flow nasal cannula; FiO2%: 30   Pulse Oximetry: 92% - 97%   3 L - 20 L per high flow nasal cannula; FiO2%: 30  Treatment: Pulmonary consult; nebulizer; oxygen; RT protocol; FEES  Risk Factors: Asthma; stridor; TONYA; obesity; home oxygen dependence  Options provided:   -- Chronic respiratory failure with hypoxia   -- Acute on chronic respiratory failure with hypoxia   -- Unable to determine      Query created by: Camelia Rubio on 2019 7:26 AM    RESPONSE TEXT:    Acute on chronic respiratory failure with hypoxia          Electronically signed by:  KIMMY RENEE 2019 11:52 AM              "

## 2019-09-27 NOTE — CARE PLAN
Respiratory Therapy Update    Interdisciplinary Plan of Care-Goals (Indications)  Bronchodilator Indications: History / Diagnosis (09/26/19 1845)  Interdisciplinary Plan of Care-Outcomes   Bronchodilator Outcome: Patient at Stable Baseline (09/26/19 1845)    #SVN Performed: Yes (09/26/19 1845)    Cough: Non Productive;Productive;Strong (09/26/19 1845)  Sputum Amount: Unable to Evaluate (09/26/19 1845)  Sputum Color: Unable to Evaluate (09/26/19 1845)  Sputum Consistency: Unable to Evaluate (09/26/19 1845)    Heated Hi Flow Nasal Cannula (HHFNC): Yes (09/26/19 0618)  FiO2 (HHFNC): 30 (09/26/19 0618)  Flowrate (HHFNC): 20 (09/26/19 0618)    FiO2%: 30 % (09/26/19 0900)  O2 (LPM): 4 (09/26/19 1915)  O2 Daily Delivery Respiratory : Silicone Nasal Cannula (09/26/19 1845)    Breath Sounds  Pre/Post Intervention: Pre Intervention Assessment (09/26/19 1845)  RUL Breath Sounds: Expiratory Wheeze  RML Breath Sounds: Expiratory Wheeze  RLL Breath Sounds: Expiratory Wheeze  MARY Breath Sounds: Expiratory Wheeze  LLL Breath Sounds: Expiratory Wheeze    Events/Summary/Plan: Duo QID, Symbicort BID, Spiriva once a day. Pt compliant with therapy.

## 2019-09-27 NOTE — ASSESSMENT & PLAN NOTE
Stridor with forced expiratory maneuvers  Reported history of asthma, however no corroborated on most recent PFTs  ? vocal cord dysfunction, although FEES normal    Plan:  - Cont symbicort/spiriva/singulair, duonebs BID and albuterol HFA as needed  - Cont vit D supplementation  - From a pulmonary perspective, can wean off prednisone- although would check with optho as she was initially started on it for optic neuritis   - f/u with Dr Oconnor ENT as outpatient for further workup of ? VCD  - Discussed SLP and cognitive behavioral therapy for VCD with patient, she is agreeable, can be coordinated as outpatient  - Pt to followup with Dr Herrera in pulm clinic 10/4/19  - Cont PPI and ranitidine

## 2019-09-28 LAB
GLUCOSE BLD-MCNC: 112 MG/DL (ref 65–99)
GLUCOSE BLD-MCNC: 115 MG/DL (ref 65–99)
GLUCOSE BLD-MCNC: 132 MG/DL (ref 65–99)
GLUCOSE BLD-MCNC: 152 MG/DL (ref 65–99)

## 2019-09-28 PROCEDURE — 700111 HCHG RX REV CODE 636 W/ 250 OVERRIDE (IP): Performed by: INTERNAL MEDICINE

## 2019-09-28 PROCEDURE — 770006 HCHG ROOM/CARE - MED/SURG/GYN SEMI*

## 2019-09-28 PROCEDURE — A9270 NON-COVERED ITEM OR SERVICE: HCPCS | Performed by: HOSPITALIST

## 2019-09-28 PROCEDURE — 82962 GLUCOSE BLOOD TEST: CPT | Mod: 91

## 2019-09-28 PROCEDURE — 700111 HCHG RX REV CODE 636 W/ 250 OVERRIDE (IP): Performed by: HOSPITALIST

## 2019-09-28 PROCEDURE — 700101 HCHG RX REV CODE 250: Performed by: HOSPITALIST

## 2019-09-28 PROCEDURE — 94640 AIRWAY INHALATION TREATMENT: CPT

## 2019-09-28 PROCEDURE — 94760 N-INVAS EAR/PLS OXIMETRY 1: CPT

## 2019-09-28 PROCEDURE — 700102 HCHG RX REV CODE 250 W/ 637 OVERRIDE(OP): Performed by: HOSPITALIST

## 2019-09-28 PROCEDURE — 99232 SBSQ HOSP IP/OBS MODERATE 35: CPT | Performed by: HOSPITALIST

## 2019-09-28 RX ADMIN — FOLIC ACID 1 MG: 1 TABLET ORAL at 06:22

## 2019-09-28 RX ADMIN — FLUOXETINE HYDROCHLORIDE 40 MG: 20 CAPSULE ORAL at 06:22

## 2019-09-28 RX ADMIN — ONDANSETRON 4 MG: 2 INJECTION INTRAMUSCULAR; INTRAVENOUS at 13:38

## 2019-09-28 RX ADMIN — PREGABALIN 300 MG: 150 CAPSULE ORAL at 18:06

## 2019-09-28 RX ADMIN — IPRATROPIUM BROMIDE AND ALBUTEROL SULFATE 3 ML: .5; 3 SOLUTION RESPIRATORY (INHALATION) at 20:01

## 2019-09-28 RX ADMIN — IPRATROPIUM BROMIDE AND ALBUTEROL SULFATE 3 ML: .5; 3 SOLUTION RESPIRATORY (INHALATION) at 14:48

## 2019-09-28 RX ADMIN — FAMOTIDINE 20 MG: 20 TABLET ORAL at 18:07

## 2019-09-28 RX ADMIN — METHOCARBAMOL TABLETS 750 MG: 750 TABLET, COATED ORAL at 15:54

## 2019-09-28 RX ADMIN — VITAMIN D, TAB 1000IU (100/BT) 1000 UNITS: 25 TAB at 06:24

## 2019-09-28 RX ADMIN — ACETAMINOPHEN 650 MG: 325 TABLET, FILM COATED ORAL at 11:40

## 2019-09-28 RX ADMIN — METHOCARBAMOL TABLETS 750 MG: 750 TABLET, COATED ORAL at 20:03

## 2019-09-28 RX ADMIN — SENNOSIDES, DOCUSATE SODIUM 2 TABLET: 50; 8.6 TABLET, FILM COATED ORAL at 18:06

## 2019-09-28 RX ADMIN — BUSPIRONE HYDROCHLORIDE 10 MG: 10 TABLET ORAL at 18:07

## 2019-09-28 RX ADMIN — FAMOTIDINE 20 MG: 20 TABLET ORAL at 06:22

## 2019-09-28 RX ADMIN — SODIUM BICARBONATE 650 MG: 650 TABLET ORAL at 06:24

## 2019-09-28 RX ADMIN — PREDNISONE 20 MG: 20 TABLET ORAL at 06:24

## 2019-09-28 RX ADMIN — INSULIN HUMAN 2 UNITS: 100 INJECTION, SOLUTION PARENTERAL at 18:10

## 2019-09-28 RX ADMIN — ZIPRASIDONE HYDROCHLORIDE 80 MG: 80 CAPSULE ORAL at 06:24

## 2019-09-28 RX ADMIN — METHOCARBAMOL TABLETS 750 MG: 750 TABLET, COATED ORAL at 08:04

## 2019-09-28 RX ADMIN — FUROSEMIDE 80 MG: 40 TABLET ORAL at 06:22

## 2019-09-28 RX ADMIN — MYCOPHENOLATE MOFETIL 1000 MG: 250 CAPSULE ORAL at 18:06

## 2019-09-28 RX ADMIN — GABAPENTIN 300 MG: 300 CAPSULE ORAL at 22:38

## 2019-09-28 RX ADMIN — LORAZEPAM 1 MG: 1 TABLET ORAL at 20:03

## 2019-09-28 RX ADMIN — ACETAMINOPHEN 650 MG: 325 TABLET, FILM COATED ORAL at 18:48

## 2019-09-28 RX ADMIN — ENOXAPARIN SODIUM 40 MG: 100 INJECTION SUBCUTANEOUS at 18:06

## 2019-09-28 RX ADMIN — ASPIRIN 81 MG: 81 TABLET, COATED ORAL at 06:22

## 2019-09-28 RX ADMIN — TRAMADOL HYDROCHLORIDE 50 MG: 50 TABLET ORAL at 15:57

## 2019-09-28 RX ADMIN — PREGABALIN 300 MG: 150 CAPSULE ORAL at 06:24

## 2019-09-28 RX ADMIN — BUDESONIDE AND FORMOTEROL FUMARATE DIHYDRATE 2 PUFF: 160; 4.5 AEROSOL RESPIRATORY (INHALATION) at 18:22

## 2019-09-28 RX ADMIN — ENOXAPARIN SODIUM 40 MG: 100 INJECTION SUBCUTANEOUS at 06:23

## 2019-09-28 RX ADMIN — BUSPIRONE HYDROCHLORIDE 10 MG: 10 TABLET ORAL at 06:22

## 2019-09-28 RX ADMIN — IPRATROPIUM BROMIDE AND ALBUTEROL SULFATE 3 ML: .5; 3 SOLUTION RESPIRATORY (INHALATION) at 11:36

## 2019-09-28 RX ADMIN — TIOTROPIUM BROMIDE 1 CAPSULE: 18 CAPSULE ORAL; RESPIRATORY (INHALATION) at 06:25

## 2019-09-28 RX ADMIN — GABAPENTIN 300 MG: 300 CAPSULE ORAL at 09:36

## 2019-09-28 RX ADMIN — LEVOTHYROXINE SODIUM 75 MCG: 75 TABLET ORAL at 06:22

## 2019-09-28 RX ADMIN — ZIPRASIDONE HYDROCHLORIDE 80 MG: 80 CAPSULE ORAL at 18:06

## 2019-09-28 RX ADMIN — IPRATROPIUM BROMIDE AND ALBUTEROL SULFATE 3 ML: .5; 3 SOLUTION RESPIRATORY (INHALATION) at 07:51

## 2019-09-28 RX ADMIN — ACETAMINOPHEN 650 MG: 325 TABLET, FILM COATED ORAL at 01:09

## 2019-09-28 RX ADMIN — MONTELUKAST 10 MG: 10 TABLET, FILM COATED ORAL at 06:22

## 2019-09-28 RX ADMIN — ONDANSETRON 4 MG: 2 INJECTION INTRAMUSCULAR; INTRAVENOUS at 06:13

## 2019-09-28 RX ADMIN — TRAMADOL HYDROCHLORIDE 50 MG: 50 TABLET ORAL at 22:38

## 2019-09-28 RX ADMIN — MYCOPHENOLATE MOFETIL 1000 MG: 250 CAPSULE ORAL at 06:24

## 2019-09-28 RX ADMIN — TRAMADOL HYDROCHLORIDE 50 MG: 50 TABLET ORAL at 08:04

## 2019-09-28 RX ADMIN — TRAZODONE HYDROCHLORIDE 100 MG: 50 TABLET ORAL at 20:04

## 2019-09-28 RX ADMIN — GABAPENTIN 300 MG: 300 CAPSULE ORAL at 13:37

## 2019-09-28 RX ADMIN — BUDESONIDE AND FORMOTEROL FUMARATE DIHYDRATE 2 PUFF: 160; 4.5 AEROSOL RESPIRATORY (INHALATION) at 06:25

## 2019-09-28 RX ADMIN — SODIUM BICARBONATE 650 MG: 650 TABLET ORAL at 18:06

## 2019-09-28 RX ADMIN — GABAPENTIN 300 MG: 300 CAPSULE ORAL at 18:07

## 2019-09-28 RX ADMIN — SENNOSIDES, DOCUSATE SODIUM 2 TABLET: 50; 8.6 TABLET, FILM COATED ORAL at 06:24

## 2019-09-28 ASSESSMENT — ENCOUNTER SYMPTOMS
HEADACHES: 0
STRIDOR: 0
SINUS PAIN: 0
NECK PAIN: 1
BLURRED VISION: 0
DIARRHEA: 0
NAUSEA: 0
SHORTNESS OF BREATH: 1
WHEEZING: 1
BACK PAIN: 1
FOCAL WEAKNESS: 0
FEVER: 0
MYALGIAS: 1
ABDOMINAL PAIN: 1
DEPRESSION: 0
CONSTIPATION: 0
TINGLING: 0
PHOTOPHOBIA: 0
SORE THROAT: 0
CHILLS: 0
TREMORS: 0
NERVOUS/ANXIOUS: 1

## 2019-09-28 NOTE — PROGRESS NOTES
Hospital Medicine Daily Progress Note    Date of Service  9/28/2019    Chief Complaint  28 y.o. female admitted 9/25/2019 with wheezing.    Hospital Course    Ms. Balderrama has a past medical history of optic neuritis as well as asthma.  She was recently discharged from [facility] due to asthma exacerbation and is on a prednisone taper now at 10 mg daily.  She presented to the emergency room with complaints of stridor and wheezing that started the morning of presentation.  Given the degree of her symptoms, she has been admitted to the intensive care unit with critical care consultation.  Baseline oxygen needs 2-4 L          Interval Problem Update  Heart rate 90s-100, blood pressure within normal limits.  Requiring 3-4 L of oxygen to achieve adequate saturation  Still having shortness of breath however slightly better compared to yesterday continue incentive spirometer  Pulmonary have been consulted and following.  Patient is very weak, PT/OT ordered, pending  Discussed with patient, patient's nurse and charge nurse.      Consultants/Specialty  Critical care.   Pulmonary    Code Status  Full    Disposition  TBD, pending PT/OT    Review of Systems  Review of Systems   Constitutional: Positive for malaise/fatigue. Negative for chills and fever.   HENT: Negative for sinus pain and sore throat.    Eyes: Negative for blurred vision and photophobia.   Respiratory: Positive for shortness of breath (Improving) and wheezing (Improving). Negative for stridor.         Stridor   Cardiovascular: Negative for chest pain and leg swelling.   Gastrointestinal: Positive for abdominal pain. Negative for constipation, diarrhea and nausea.   Genitourinary: Negative for dysuria, hematuria and urgency.   Musculoskeletal: Positive for back pain, joint pain, myalgias and neck pain.   Neurological: Negative for tingling, tremors, focal weakness and headaches.   Psychiatric/Behavioral: Negative for depression. The patient is  nervous/anxious.         Physical Exam  Temp:  [36.4 °C (97.5 °F)-37.1 °C (98.7 °F)] 36.6 °C (97.9 °F)  Pulse:  [] 94  Resp:  [16-22] 16  BP: (142-145)/(78-95) 142/95  SpO2:  [94 %-97 %] 94 %    Physical Exam   Constitutional: She is oriented to person, place, and time. No distress.   Obese    HENT:   Head: Normocephalic and atraumatic.   Mouth/Throat: Oropharynx is clear and moist.   Eyes: Right eye exhibits no discharge. Left eye exhibits no discharge. No scleral icterus.   Neck: Neck supple. No JVD present. No thyromegaly present.   Cardiovascular: Normal rate and regular rhythm.   No murmur heard.  Pulmonary/Chest: Effort normal and breath sounds normal. She has no wheezes. She has no rales.   Abdominal: Soft. She exhibits no distension. There is tenderness.   Musculoskeletal: Normal range of motion. She exhibits tenderness.   B/l LE strength is 3/5   Lymphadenopathy:     She has no cervical adenopathy.   Neurological: She is alert and oriented to person, place, and time. She exhibits normal muscle tone.   Skin: Skin is warm and dry. She is not diaphoretic. There is pallor.   Psychiatric: She has a normal mood and affect. Her behavior is normal.   Anxious   Nursing note and vitals reviewed.      Fluids    Intake/Output Summary (Last 24 hours) at 9/28/2019 1544  Last data filed at 9/28/2019 0045  Gross per 24 hour   Intake 660 ml   Output --   Net 660 ml       Laboratory                        Imaging  DX-NECK FOR SOFT TISSUE   Final Result    Examination limited by patient body habitus.   No prevertebral/retropharyngeal edema is identified.         DX-CHEST-PORTABLE (1 VIEW)   Final Result      No acute cardiopulmonary process is seen.           Assessment/Plan  * Chronic respiratory failure with hypoxia, on home oxygen therapy ([facility])- (present on admission)  Assessment & Plan  Patient is maintained on montelukast and Spiriva  She has been on high flow oxygen  Her baseline oxygen is 2-4 L NC  O2 and  respiratory protocols  Pulmonology consulted  Continue prednisone     Controlled type 2 diabetes mellitus without complication, without long-term current use of insulin ([facility])- (present on admission)  Assessment & Plan  The patient's last A1c 1 month ago was 6.0.  Continue her dulaglutide and cover with sliding scale     Stridor  Assessment & Plan  This is been waxing and waning  Speech pathology did a FEES to evaluate her swallowing.     Optic neuritis  Assessment & Plan  Continue CellCept  Followed by Dr. Yousif     TONYA (obstructive sleep apnea)- (present on admission)  Assessment & Plan  Followed outpatient by pulmonology.  Currently not on CPAP    Weakness of both lower extremities- (present on admission)  Assessment & Plan  Multifactorial, obesity, shortness of breath  Physical and occupational therapy evaluation     Morbidly obese (HCC)- (present on admission)  Assessment & Plan  BMI 47     H/O prior ablation treatment- (present on admission)  Assessment & Plan  Patient is a previous history of SVT and is status post ablation.  Continue Ivabradine    Lower abdominal pain  Assessment & Plan  Suprapubic, UA on 9/25 showed some WBC, will send for cultures  Monitor      Acquired hypothyroidism- (present on admission)  Assessment & Plan  Continue Synthroid    Schizophrenia (HCC)- (present on admission)  Assessment & Plan  Continue Geodon, Buspar, and Prozac    GERD (gastroesophageal reflux disease)- (present on admission)  Assessment & Plan  Continue H2    Anxiety- (present on admission)  Assessment & Plan  Continue the patient's outpatient psychiatric medication regimen      VTE prophylaxis: lovenox

## 2019-09-28 NOTE — PROGRESS NOTES
Assumed care of patient at change of shift.  During change of shift report, patient (pt) sleeping in bed, on 3L via NC Diminished lung sounds.  Assistance 1 for bedside commode.  Front wheel walker with ambulation.  Fall precautions in place- bed lowest position, non-slip socks, call light and personal items in reach.  No other needs at this time.

## 2019-09-28 NOTE — CARE PLAN
Problem: Safety  Goal: Will remain free from falls  Outcome: PROGRESSING AS EXPECTED     Problem: Respiratory:  Goal: Respiratory status will improve  Outcome: PROGRESSING SLOWER THAN EXPECTED  Note:   Shortness of breath on exertion.  Continues on 3L O2.  RT on board with neb treatments.  Continue to take home med regiment.

## 2019-09-29 PROBLEM — E87.6 HYPOKALEMIA: Status: ACTIVE | Noted: 2019-09-29

## 2019-09-29 PROBLEM — E11.9 CONTROLLED TYPE 2 DIABETES MELLITUS WITHOUT COMPLICATION, WITHOUT LONG-TERM CURRENT USE OF INSULIN (HCC): Status: RESOLVED | Noted: 2017-04-26 | Resolved: 2019-09-29

## 2019-09-29 PROBLEM — E03.9 ACQUIRED HYPOTHYROIDISM: Chronic | Status: RESOLVED | Noted: 2017-08-04 | Resolved: 2019-09-29

## 2019-09-29 LAB
ANION GAP SERPL CALC-SCNC: 14 MMOL/L (ref 0–11.9)
BACTERIA UR CULT: NORMAL
BASOPHILS # BLD AUTO: 0.6 % (ref 0–1.8)
BASOPHILS # BLD: 0.04 K/UL (ref 0–0.12)
BUN SERPL-MCNC: 20 MG/DL (ref 8–22)
CALCIUM SERPL-MCNC: 9.6 MG/DL (ref 8.5–10.5)
CHLORIDE SERPL-SCNC: 100 MMOL/L (ref 96–112)
CK SERPL-CCNC: 22 U/L (ref 0–154)
CO2 SERPL-SCNC: 26 MMOL/L (ref 20–33)
CREAT SERPL-MCNC: 0.97 MG/DL (ref 0.5–1.4)
EOSINOPHIL # BLD AUTO: 0.04 K/UL (ref 0–0.51)
EOSINOPHIL NFR BLD: 0.6 % (ref 0–6.9)
ERYTHROCYTE [DISTWIDTH] IN BLOOD BY AUTOMATED COUNT: 45.6 FL (ref 35.9–50)
GLUCOSE BLD-MCNC: 125 MG/DL (ref 65–99)
GLUCOSE BLD-MCNC: 130 MG/DL (ref 65–99)
GLUCOSE BLD-MCNC: 138 MG/DL (ref 65–99)
GLUCOSE BLD-MCNC: 174 MG/DL (ref 65–99)
GLUCOSE SERPL-MCNC: 109 MG/DL (ref 65–99)
HCT VFR BLD AUTO: 40.3 % (ref 37–47)
HGB BLD-MCNC: 13.3 G/DL (ref 12–16)
IMM GRANULOCYTES # BLD AUTO: 0.06 K/UL (ref 0–0.11)
IMM GRANULOCYTES NFR BLD AUTO: 0.8 % (ref 0–0.9)
LYMPHOCYTES # BLD AUTO: 2.58 K/UL (ref 1–4.8)
LYMPHOCYTES NFR BLD: 35.8 % (ref 22–41)
MAGNESIUM SERPL-MCNC: 2.2 MG/DL (ref 1.5–2.5)
MCH RBC QN AUTO: 32 PG (ref 27–33)
MCHC RBC AUTO-ENTMCNC: 33 G/DL (ref 33.6–35)
MCV RBC AUTO: 96.9 FL (ref 81.4–97.8)
MONOCYTES # BLD AUTO: 0.77 K/UL (ref 0–0.85)
MONOCYTES NFR BLD AUTO: 10.7 % (ref 0–13.4)
NEUTROPHILS # BLD AUTO: 3.72 K/UL (ref 2–7.15)
NEUTROPHILS NFR BLD: 51.5 % (ref 44–72)
NRBC # BLD AUTO: 0 K/UL
NRBC BLD-RTO: 0 /100 WBC
PLATELET # BLD AUTO: 161 K/UL (ref 164–446)
PMV BLD AUTO: 11.2 FL (ref 9–12.9)
POTASSIUM SERPL-SCNC: 3.3 MMOL/L (ref 3.6–5.5)
RBC # BLD AUTO: 4.16 M/UL (ref 4.2–5.4)
SIGNIFICANT IND 70042: NORMAL
SITE SITE: NORMAL
SODIUM SERPL-SCNC: 140 MMOL/L (ref 135–145)
SOURCE SOURCE: NORMAL
WBC # BLD AUTO: 7.2 K/UL (ref 4.8–10.8)

## 2019-09-29 PROCEDURE — A9270 NON-COVERED ITEM OR SERVICE: HCPCS | Performed by: INTERNAL MEDICINE

## 2019-09-29 PROCEDURE — 99233 SBSQ HOSP IP/OBS HIGH 50: CPT | Performed by: INTERNAL MEDICINE

## 2019-09-29 PROCEDURE — A9270 NON-COVERED ITEM OR SERVICE: HCPCS | Performed by: HOSPITALIST

## 2019-09-29 PROCEDURE — 82550 ASSAY OF CK (CPK): CPT

## 2019-09-29 PROCEDURE — 94760 N-INVAS EAR/PLS OXIMETRY 1: CPT

## 2019-09-29 PROCEDURE — 700101 HCHG RX REV CODE 250: Performed by: HOSPITALIST

## 2019-09-29 PROCEDURE — 94640 AIRWAY INHALATION TREATMENT: CPT

## 2019-09-29 PROCEDURE — 97162 PT EVAL MOD COMPLEX 30 MIN: CPT

## 2019-09-29 PROCEDURE — 82962 GLUCOSE BLOOD TEST: CPT | Mod: 91

## 2019-09-29 PROCEDURE — 700111 HCHG RX REV CODE 636 W/ 250 OVERRIDE (IP): Performed by: HOSPITALIST

## 2019-09-29 PROCEDURE — 770006 HCHG ROOM/CARE - MED/SURG/GYN SEMI*

## 2019-09-29 PROCEDURE — 700102 HCHG RX REV CODE 250 W/ 637 OVERRIDE(OP): Performed by: HOSPITALIST

## 2019-09-29 PROCEDURE — 85025 COMPLETE CBC W/AUTO DIFF WBC: CPT

## 2019-09-29 PROCEDURE — 84425 ASSAY OF VITAMIN B-1: CPT

## 2019-09-29 PROCEDURE — 36415 COLL VENOUS BLD VENIPUNCTURE: CPT

## 2019-09-29 PROCEDURE — 83735 ASSAY OF MAGNESIUM: CPT

## 2019-09-29 PROCEDURE — 700111 HCHG RX REV CODE 636 W/ 250 OVERRIDE (IP): Performed by: INTERNAL MEDICINE

## 2019-09-29 PROCEDURE — 700102 HCHG RX REV CODE 250 W/ 637 OVERRIDE(OP): Performed by: INTERNAL MEDICINE

## 2019-09-29 PROCEDURE — 80048 BASIC METABOLIC PNL TOTAL CA: CPT

## 2019-09-29 PROCEDURE — 302146: Performed by: HOSPITALIST

## 2019-09-29 PROCEDURE — 99232 SBSQ HOSP IP/OBS MODERATE 35: CPT | Performed by: HOSPITALIST

## 2019-09-29 RX ORDER — POTASSIUM CHLORIDE 20 MEQ/1
40 TABLET, EXTENDED RELEASE ORAL ONCE
Status: COMPLETED | OUTPATIENT
Start: 2019-09-29 | End: 2019-09-29

## 2019-09-29 RX ADMIN — ZIPRASIDONE HYDROCHLORIDE 80 MG: 80 CAPSULE ORAL at 17:29

## 2019-09-29 RX ADMIN — METHOCARBAMOL TABLETS 750 MG: 750 TABLET, COATED ORAL at 20:46

## 2019-09-29 RX ADMIN — GABAPENTIN 300 MG: 300 CAPSULE ORAL at 17:29

## 2019-09-29 RX ADMIN — IPRATROPIUM BROMIDE AND ALBUTEROL SULFATE 3 ML: .5; 3 SOLUTION RESPIRATORY (INHALATION) at 00:21

## 2019-09-29 RX ADMIN — FAMOTIDINE 20 MG: 20 TABLET ORAL at 17:29

## 2019-09-29 RX ADMIN — POTASSIUM CHLORIDE 40 MEQ: 1500 TABLET, EXTENDED RELEASE ORAL at 07:58

## 2019-09-29 RX ADMIN — INSULIN HUMAN 2 UNITS: 100 INJECTION, SOLUTION PARENTERAL at 12:24

## 2019-09-29 RX ADMIN — ENOXAPARIN SODIUM 40 MG: 100 INJECTION SUBCUTANEOUS at 05:34

## 2019-09-29 RX ADMIN — ACETAMINOPHEN 650 MG: 325 TABLET, FILM COATED ORAL at 01:15

## 2019-09-29 RX ADMIN — BUSPIRONE HYDROCHLORIDE 10 MG: 10 TABLET ORAL at 17:29

## 2019-09-29 RX ADMIN — TRAZODONE HYDROCHLORIDE 100 MG: 50 TABLET ORAL at 20:46

## 2019-09-29 RX ADMIN — BUSPIRONE HYDROCHLORIDE 10 MG: 10 TABLET ORAL at 05:36

## 2019-09-29 RX ADMIN — IPRATROPIUM BROMIDE AND ALBUTEROL SULFATE 3 ML: .5; 3 SOLUTION RESPIRATORY (INHALATION) at 15:20

## 2019-09-29 RX ADMIN — FUROSEMIDE 80 MG: 40 TABLET ORAL at 05:36

## 2019-09-29 RX ADMIN — GABAPENTIN 300 MG: 300 CAPSULE ORAL at 20:46

## 2019-09-29 RX ADMIN — ONDANSETRON 4 MG: 2 INJECTION INTRAMUSCULAR; INTRAVENOUS at 01:15

## 2019-09-29 RX ADMIN — METHOCARBAMOL TABLETS 750 MG: 750 TABLET, COATED ORAL at 14:57

## 2019-09-29 RX ADMIN — FLUOXETINE HYDROCHLORIDE 40 MG: 20 CAPSULE ORAL at 05:35

## 2019-09-29 RX ADMIN — IPRATROPIUM BROMIDE AND ALBUTEROL SULFATE 3 ML: .5; 3 SOLUTION RESPIRATORY (INHALATION) at 07:42

## 2019-09-29 RX ADMIN — GABAPENTIN 300 MG: 300 CAPSULE ORAL at 09:30

## 2019-09-29 RX ADMIN — ENOXAPARIN SODIUM 40 MG: 100 INJECTION SUBCUTANEOUS at 17:28

## 2019-09-29 RX ADMIN — TRAMADOL HYDROCHLORIDE 50 MG: 50 TABLET ORAL at 18:34

## 2019-09-29 RX ADMIN — IPRATROPIUM BROMIDE AND ALBUTEROL SULFATE 3 ML: .5; 3 SOLUTION RESPIRATORY (INHALATION) at 03:27

## 2019-09-29 RX ADMIN — PREGABALIN 300 MG: 150 CAPSULE ORAL at 17:29

## 2019-09-29 RX ADMIN — BUDESONIDE AND FORMOTEROL FUMARATE DIHYDRATE 2 PUFF: 160; 4.5 AEROSOL RESPIRATORY (INHALATION) at 17:29

## 2019-09-29 RX ADMIN — VITAMIN D, TAB 1000IU (100/BT) 1000 UNITS: 25 TAB at 05:36

## 2019-09-29 RX ADMIN — SENNOSIDES, DOCUSATE SODIUM 2 TABLET: 50; 8.6 TABLET, FILM COATED ORAL at 17:29

## 2019-09-29 RX ADMIN — METHOCARBAMOL TABLETS 750 MG: 750 TABLET, COATED ORAL at 09:31

## 2019-09-29 RX ADMIN — TRAMADOL HYDROCHLORIDE 50 MG: 50 TABLET ORAL at 05:35

## 2019-09-29 RX ADMIN — ACETAMINOPHEN 650 MG: 325 TABLET, FILM COATED ORAL at 09:30

## 2019-09-29 RX ADMIN — MYCOPHENOLATE MOFETIL 1000 MG: 250 CAPSULE ORAL at 17:29

## 2019-09-29 RX ADMIN — SENNOSIDES, DOCUSATE SODIUM 2 TABLET: 50; 8.6 TABLET, FILM COATED ORAL at 05:36

## 2019-09-29 RX ADMIN — LEVOTHYROXINE SODIUM 75 MCG: 75 TABLET ORAL at 05:36

## 2019-09-29 RX ADMIN — ASPIRIN 81 MG: 81 TABLET, COATED ORAL at 05:36

## 2019-09-29 RX ADMIN — ACETAMINOPHEN 650 MG: 325 TABLET, FILM COATED ORAL at 16:28

## 2019-09-29 RX ADMIN — TIOTROPIUM BROMIDE 1 CAPSULE: 18 CAPSULE ORAL; RESPIRATORY (INHALATION) at 05:35

## 2019-09-29 RX ADMIN — FOLIC ACID 1 MG: 1 TABLET ORAL at 05:36

## 2019-09-29 RX ADMIN — MYCOPHENOLATE MOFETIL 1000 MG: 250 CAPSULE ORAL at 05:33

## 2019-09-29 RX ADMIN — IPRATROPIUM BROMIDE AND ALBUTEROL SULFATE 3 ML: .5; 3 SOLUTION RESPIRATORY (INHALATION) at 11:19

## 2019-09-29 RX ADMIN — PREDNISONE 20 MG: 20 TABLET ORAL at 05:35

## 2019-09-29 RX ADMIN — BUDESONIDE AND FORMOTEROL FUMARATE DIHYDRATE 2 PUFF: 160; 4.5 AEROSOL RESPIRATORY (INHALATION) at 05:36

## 2019-09-29 RX ADMIN — LORAZEPAM 1 MG: 1 TABLET ORAL at 13:19

## 2019-09-29 RX ADMIN — TRAMADOL HYDROCHLORIDE 50 MG: 50 TABLET ORAL at 11:44

## 2019-09-29 RX ADMIN — FAMOTIDINE 20 MG: 20 TABLET ORAL at 05:35

## 2019-09-29 RX ADMIN — SODIUM BICARBONATE 650 MG: 650 TABLET ORAL at 05:35

## 2019-09-29 RX ADMIN — ONDANSETRON 4 MG: 2 INJECTION INTRAMUSCULAR; INTRAVENOUS at 12:20

## 2019-09-29 RX ADMIN — MONTELUKAST 10 MG: 10 TABLET, FILM COATED ORAL at 05:34

## 2019-09-29 RX ADMIN — SODIUM BICARBONATE 650 MG: 650 TABLET ORAL at 17:29

## 2019-09-29 RX ADMIN — GABAPENTIN 300 MG: 300 CAPSULE ORAL at 14:57

## 2019-09-29 RX ADMIN — ZIPRASIDONE HYDROCHLORIDE 80 MG: 80 CAPSULE ORAL at 05:34

## 2019-09-29 RX ADMIN — PREGABALIN 300 MG: 150 CAPSULE ORAL at 05:36

## 2019-09-29 RX ADMIN — THERA TABS 1 TABLET: TAB at 09:30

## 2019-09-29 ASSESSMENT — ENCOUNTER SYMPTOMS
VOMITING: 0
HALLUCINATIONS: 0
BRUISES/BLEEDS EASILY: 1
PALPITATIONS: 0
COUGH: 1
NAUSEA: 0
BLURRED VISION: 0
MYALGIAS: 1
WEIGHT LOSS: 0
MEMORY LOSS: 0
DIZZINESS: 0
ORTHOPNEA: 0
POLYDIPSIA: 0
FALLS: 1
SENSORY CHANGE: 0
FOCAL WEAKNESS: 0
HEADACHES: 0
NERVOUS/ANXIOUS: 1
CONSTIPATION: 0
PHOTOPHOBIA: 0
FEVER: 0
TINGLING: 0
SORE THROAT: 0
DEPRESSION: 0
NERVOUS/ANXIOUS: 0
EYE DISCHARGE: 0
LOSS OF CONSCIOUSNESS: 0
ABDOMINAL PAIN: 1
DIARRHEA: 0
SPUTUM PRODUCTION: 0
NECK PAIN: 1
SINUS PAIN: 0
CHILLS: 0
DEPRESSION: 1
BACK PAIN: 1
STRIDOR: 1
INSOMNIA: 0
EYE PAIN: 0
ABDOMINAL PAIN: 0
TREMORS: 1
SHORTNESS OF BREATH: 1
SORE THROAT: 1
WHEEZING: 1
HEMOPTYSIS: 0
STRIDOR: 0

## 2019-09-29 ASSESSMENT — COGNITIVE AND FUNCTIONAL STATUS - GENERAL
DRESSING REGULAR LOWER BODY CLOTHING: A LOT
PERSONAL GROOMING: A LITTLE
SUGGESTED CMS G CODE MODIFIER MOBILITY: CL
MOVING FROM LYING ON BACK TO SITTING ON SIDE OF FLAT BED: A LOT
MOBILITY SCORE: 14
STANDING UP FROM CHAIR USING ARMS: A LOT
SUGGESTED CMS G CODE MODIFIER MOBILITY: CL
CLIMB 3 TO 5 STEPS WITH RAILING: TOTAL
MOVING FROM LYING ON BACK TO SITTING ON SIDE OF FLAT BED: UNABLE
CLIMB 3 TO 5 STEPS WITH RAILING: TOTAL
DAILY ACTIVITIY SCORE: 18
HELP NEEDED FOR BATHING: A LITTLE
CLIMB 3 TO 5 STEPS WITH RAILING: A LOT
MOBILITY SCORE: 13
SUGGESTED CMS G CODE MODIFIER MOBILITY: CM
TOILETING: A LITTLE
MOVING TO AND FROM BED TO CHAIR: A LITTLE
TURNING FROM BACK TO SIDE WHILE IN FLAT BAD: A LITTLE
HELP NEEDED FOR BATHING: A LITTLE
STANDING UP FROM CHAIR USING ARMS: A LOT
WALKING IN HOSPITAL ROOM: A LOT
PERSONAL GROOMING: A LITTLE
WALKING IN HOSPITAL ROOM: A LOT
TURNING FROM BACK TO SIDE WHILE IN FLAT BAD: A LITTLE
TOILETING: A LITTLE
MOVING TO AND FROM BED TO CHAIR: A LOT
MOVING TO AND FROM BED TO CHAIR: UNABLE
TURNING FROM BACK TO SIDE WHILE IN FLAT BAD: UNABLE
WALKING IN HOSPITAL ROOM: A LOT
DRESSING REGULAR LOWER BODY CLOTHING: A LOT
MOBILITY SCORE: 8
SUGGESTED CMS G CODE MODIFIER DAILY ACTIVITY: CK
DRESSING REGULAR UPPER BODY CLOTHING: A LITTLE
MOVING FROM LYING ON BACK TO SITTING ON SIDE OF FLAT BED: A LITTLE
STANDING UP FROM CHAIR USING ARMS: A LOT

## 2019-09-29 ASSESSMENT — GAIT ASSESSMENTS
DEVIATION: DECREASED BASE OF SUPPORT
GAIT LEVEL OF ASSIST: MINIMAL ASSIST
ASSISTIVE DEVICE: FRONT WHEEL WALKER
DISTANCE (FEET): 5

## 2019-09-29 ASSESSMENT — LIFESTYLE VARIABLES
TOTAL SCORE: 0
CONSUMPTION TOTAL: NEGATIVE
SUBSTANCE_ABUSE: 0
TOTAL SCORE: 0
EVER FELT BAD OR GUILTY ABOUT YOUR DRINKING: NO
HAVE YOU EVER FELT YOU SHOULD CUT DOWN ON YOUR DRINKING: NO
TOTAL SCORE: 0
ALCOHOL_USE: NO
EVER HAD A DRINK FIRST THING IN THE MORNING TO STEADY YOUR NERVES TO GET RID OF A HANGOVER: NO
HAVE PEOPLE ANNOYED YOU BY CRITICIZING YOUR DRINKING: NO
HOW MANY TIMES IN THE PAST YEAR HAVE YOU HAD 5 OR MORE DRINKS IN A DAY: 0
ON A TYPICAL DAY WHEN YOU DRINK ALCOHOL HOW MANY DRINKS DO YOU HAVE: 0
AVERAGE NUMBER OF DAYS PER WEEK YOU HAVE A DRINK CONTAINING ALCOHOL: 0

## 2019-09-29 NOTE — PROGRESS NOTES
Pulmonary Progress Note    Date of admission  9/25/2019    Date of service  9/29/2019    Chief Complaint  28 y.o. female admitted 9/25/2019 for shortness of breath. 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 She had been exposed to cigarette smoke which is a known trigger for her respiratory issues and subsequently developed wheezing and shortness of breath. Denies any current fever, chills, sweats, productive cough, CP, nausea, vomiting, or lower extremity edema.  She attempted to utilize her nebulizers without significant benefit and proceeded to come to the ED. She is currently tapering prednisone after a recent asthma exacerbation and is on chronic low-dose prednisone for the optic neuritis. Her asthma is managed on spiriva, singulair and symbicort. She recently established care with Dr Herrera in the pulmonary clinic earlier this month on 9/5/19, where in-clinic PFTs were notable for:    - FEV1 79%   - FVC 76%  - FEV1/FVC ratio of 88.   - No significant bronchodilator response.  - TLC 83%  - DLCO 85%    Vit D levels were checked and noted to be markedly low, has since been started on supplementation  IgE level was checked and normal    She had a CTA of the chest in 7/2019 showing no parenchymal abnormalities but notable for small bilateral plueral and pericardial effusions. TTE at that time was also performed and normal.    She has multiple specialists following her care:  1. ENT- Dr Oconnor  2. Cardiology- paroxysmal atrial fibrillation requiring ablations  3. GI- Mild esophageal dysmotility, undergone esophageal dilations in the past  4. Metabolic: suspected metabolic/mitochondrial  disorder due to chronic lactic acidosis investigated at [facility], pt was unable to undergo complete testing due to financial constraints and insurance issues.   5. Pulm- Dr Herrera as noted above    Hospital Course  Kristin was admitted to the ICU due to severe hypoxia requiring high-flow supplemental O2 via HFNC. She was noted to have an upper airway inspiratory wheeze with exertion that was reducible when talking/reassurance. A soft tissue neck XR and CXR were normal.     She underwent a FEES to evaluate for VCD, contributing to underlying asthma and pickwickian syndrome. During FEES had penetration of thin liquids due to delayed initiation to swallow that resulted in immediate cough and wheeze. SLP noted vocal cords were in partially abducted position at this time and maintained adequate saturations. After study she regurgitated food/liquid that was given during the study.    Interval Problem Update  Reviewed last 24 hour events:  Sinus tachycardia, stable  Supplemental O2 requirements back to baseline 3LNC  Reporting quad and calf muscle tenderness and pain, awaiting PT/OT eval  States stridor and shortness of breath at baseline  Asking to be discharged however acknowledges need to be seen by PT/OT first for safe discharge/fall risk    Review of Systems  Review of Systems   Constitutional: Negative for chills, fever and weight loss (+20lb weight gain last year since starting prednisone.).   HENT: Positive for sore throat. Negative for congestion and sinus pain.    Eyes: Negative for pain and discharge.   Respiratory: Positive for cough, shortness of breath, wheezing and stridor. Negative for hemoptysis and sputum production.    Cardiovascular: Positive for chest pain. Negative for palpitations, orthopnea and leg swelling.   Gastrointestinal: Negative for abdominal pain, diarrhea, nausea and vomiting.   Genitourinary: Negative for dysuria, frequency and urgency.   Musculoskeletal: Positive for back pain, falls, joint  pain and myalgias.   Skin: Negative for rash.   Neurological: Negative for dizziness, sensory change, focal weakness, loss of consciousness and headaches.   Endo/Heme/Allergies: Positive for environmental allergies. Negative for polydipsia. Bruises/bleeds easily.   Psychiatric/Behavioral: Positive for depression. Negative for hallucinations, memory loss, substance abuse and suicidal ideas. The patient is nervous/anxious. The patient does not have insomnia.    All other systems reviewed and are negative.     Vital Signs for last 24 hours   Temp:  [36.2 °C (97.2 °F)-36.7 °C (98 °F)] 36.7 °C (98 °F)  Pulse:  [101-115] 106  Resp:  [16-24] 18  BP: (110-130)/(64-71) 130/71  SpO2:  [90 %-96 %] 90 %     Physical Exam   Physical Exam   Constitutional: She is oriented to person, place, and time. She appears well-developed and well-nourished. No distress.   obese   HENT:   Mouth/Throat: Oropharynx is clear and moist. No oropharyngeal exudate.   Eyes: Conjunctivae are normal. Right eye exhibits no discharge. Left eye exhibits no discharge. No scleral icterus.   Neck: Normal range of motion. Neck supple. No JVD present. No tracheal deviation present. No thyromegaly present.   Mild upper airway wheeze during forced expiratory maneuvers.   Cardiovascular: Regular rhythm and normal heart sounds. Exam reveals no friction rub.   No murmur heard.  Tachycardic, regular rhythm   Pulmonary/Chest: Effort normal. No stridor (no stridor during quiet tidal breathing.). No respiratory distress. She has no wheezes. She has no rales.   Abdominal: Soft. Bowel sounds are normal. She exhibits no distension. There is no tenderness. There is no rebound.   globus   Musculoskeletal: Normal range of motion. She exhibits no edema.   Lymphadenopathy:     She has no cervical adenopathy.   Neurological: She is alert and oriented to person, place, and time. No cranial nerve deficit. Coordination normal.   Skin: Skin is warm. No rash noted. No erythema.    Psychiatric:   anxious   Nursing note and vitals reviewed.    Medications  Current Facility-Administered Medications   Medication Dose Route Frequency Provider Last Rate Last Dose   • potassium chloride SA (Kdur) tablet 40 mEq  40 mEq Oral Once Kena Pritchard M.D.       • ipratropium-albuterol (DUONEB) nebulizer solution  3 mL Nebulization Q4HRS (RT) ANTONI Hardin.OEffie   3 mL at 09/29/19 0742   • vitamin D (cholecalciferol) tablet 1,000 Units  1,000 Units Oral DAILY Ruth Ann Sosa M.D.   1,000 Units at 09/29/19 0536   • enoxaparin (LOVENOX) inj 40 mg  40 mg Subcutaneous Q12HRS Ruth Ann Sosa M.D.   40 mg at 09/29/19 0534   • LORazepam (ATIVAN) tablet 1 mg  1 mg Oral TID PRN Ruth Ann Sosa M.D.   1 mg at 09/28/19 2003   • ipratropium-albuterol (DUONEB) nebulizer solution  3 mL Nebulization Q4H PRN (RT) Ruben Renee D.O.   3 mL at 09/27/19 1743   • ziprasidone (GEODON) capsule 80 mg  80 mg Oral BID Ruben Renee D.O.   80 mg at 09/29/19 0534   • traZODone (DESYREL) tablet 100 mg  100 mg Oral QHS Ruben Renee D.O.   100 mg at 09/28/19 2004   • tiotropium (SPIRIVA) 18 MCG inhalation capsule 1 Cap  1 Cap Inhalation DAILY Ruben Renee D.O.   1 Cap at 09/29/19 0535   • sodium bicarbonate tablet 650 mg  650 mg Oral BID Ruben Renee D.O.   650 mg at 09/29/19 0535   • mycophenolate (CELLCEPT) capsule 1,000 mg  1,000 mg Oral BID Ruben Renee D.O.   1,000 mg at 09/29/19 0533   • montelukast (SINGULAIR) tablet 10 mg  10 mg Oral DAILY Ruben Renee D.O.   10 mg at 09/29/19 0534   • methocarbamol (ROBAXIN) tablet 750 mg  750 mg Oral TID Ruben Renee D.O.   750 mg at 09/28/19 2003   • pregabalin (LYRICA) capsule 300 mg  300 mg Oral BID Ruben Renee D.O.   300 mg at 09/29/19 0536   • levothyroxine (SYNTHROID) tablet 75 mcg  75 mcg Oral AM ES Ruben Renee D.O.   75 mcg at 09/29/19 0536   • gabapentin (NEURONTIN)  capsule 300 mg  300 mg Oral 4X/DAY Ruben Renee D.O.   300 mg at 09/28/19 2238   • furosemide (LASIX) tablet 80 mg  80 mg Oral DAILY Ruben Renee, D.O.   80 mg at 09/29/19 0536   • folic acid (FOLVITE) tablet 1 mg  1 mg Oral DAILY Ruben Renee, D.O.   1 mg at 09/29/19 0536   • FLUoxetine (PROZAC) capsule 40 mg  40 mg Oral DAILY Ruben Renee, D.O.   40 mg at 09/29/19 0535   • busPIRone (BUSPAR) tablet 10 mg  10 mg Oral BID Ruben Renee D.O.   10 mg at 09/29/19 0536   • budesonide-formoterol (SYMBICORT) 160-4.5 MCG/ACT inhaler 2 Puff  2 Puff Inhalation BID ANTONI Hardin.O.   2 Puff at 09/29/19 0536   • aspirin EC (ECOTRIN) tablet 81 mg  81 mg Oral DAILY Ruben Renee, D.O.   81 mg at 09/29/19 0536   • senna-docusate (PERICOLACE or SENOKOT S) 8.6-50 MG per tablet 2 Tab  2 Tab Oral BID Ruben Renee D.O.   2 Tab at 09/29/19 0536    And   • polyethylene glycol/lytes (MIRALAX) PACKET 1 Packet  1 Packet Oral QDAY PRN ANTONI Hardin.O.        And   • magnesium hydroxide (MILK OF MAGNESIA) suspension 30 mL  30 mL Oral QDAY PRN ANTONI Hardin.O.        And   • bisacodyl (DULCOLAX) suppository 10 mg  10 mg Rectal QDAY PRN ANTONI Hardin.O.       • Respiratory Care per Protocol   Nebulization Continuous RT ANTONI Hardin.O.       • acetaminophen (TYLENOL) tablet 650 mg  650 mg Oral Q6HRS PRN ANTONI Hardin.O.   650 mg at 09/29/19 0115   • tramadol (ULTRAM) 50 MG tablet 50 mg  50 mg Oral Q6HRS PRN Ruben Renee D.O.   50 mg at 09/29/19 0535   • predniSONE (DELTASONE) tablet 20 mg  20 mg Oral DAILY Ruben Renee D.O.   20 mg at 09/29/19 0535   • famotidine (PEPCID) tablet 20 mg  20 mg Oral BID Ruben Renee D.O.   20 mg at 09/29/19 0535   • insulin regular (HUMULIN R) injection 2-9 Units  2-9 Units Subcutaneous 4X/DAY ANTONI Rubio.[email or web address]   Stopped at  09/28/19 2100    And   • glucose 4 g chewable tablet 16 g  16 g Oral Q15 MIN PRN Ruben Renee D.MADELIN.        And   • DEXTROSE 10% BOLUS 250 mL  250 mL Intravenous Q15 MIN PRN Ruben Renee D.O.       • ondansetron (ZOFRAN) syringe/vial injection 4 mg  4 mg Intravenous Q4HRS PRN Emery Cao M.D.   4 mg at 09/29/19 0115   • Ivabradine HCl TABS 7.5 mg  7.5 mg Oral BID Ruben Renee D.O.   7.5 mg at 09/29/19 0600     Fluids    Intake/Output Summary (Last 24 hours) at 9/29/2019 0753  Last data filed at 9/29/2019 0400  Gross per 24 hour   Intake 600 ml   Output 610 ml   Net -10 ml     Laboratory          Recent Labs     09/29/19  0148   SODIUM 140   POTASSIUM 3.3*   CHLORIDE 100   CO2 26   BUN 20   CREATININE 0.97   MAGNESIUM 2.2   CALCIUM 9.6     Recent Labs     09/29/19  0148   GLUCOSE 109*     Recent Labs     09/29/19  0148   WBC 7.2   NEUTSPOLYS 51.50   LYMPHOCYTES 35.80   MONOCYTES 10.70   EOSINOPHILS 0.60   BASOPHILS 0.60     Recent Labs     09/29/19 0148   RBC 4.16*   HEMOGLOBIN 13.3   HEMATOCRIT 40.3   PLATELETCT 161*     Imaging  X-Ray:  I have personally reviewed the images and compared with prior images.  Echo:   Reviewed    Assessment/Plan  * Chronic respiratory failure with hypoxia, on home oxygen therapy (HCC)- (present on admission)  Assessment & Plan  - Asthma an unlikely explanation for her chronic O2 dependence. PFTs from earlier this month showing borderline restrictive pattern, no BD response, and borderline low DLCO.  - In setting of normal lung parenchyma seen on recent CT, this PFT pattern is suggestive of extrathoracic restriction/hypoventilation likely due to her body habitus/pickwickian syndrome, cannot rule out a contributing neuromuscular disease. Can consider MIPS/MEPS as outpatient.  - Furthermore, the suspected underlying metabolic disorder may also be contributing to her chronic hypoxic respiratory failure due to increased tissue O2 extraction    Plan:  -  Currently she is on her baseline O2 requirements, continue to titrate to maintain > 92%  - Cont incentive spirometry, mobilization, nebulizers, RT/O2 protocol  - Consider MIPS/MEPs as outpatient for aforementioned issues      Stridor  Assessment & Plan  Stridor with forced expiratory maneuvers  Reported history of asthma, however no corroborated on most recent PFTs  ? vocal cord dysfunction, although FEES normal    Plan:  - Cont symbicort/spiriva/singulair, duonebs BID and albuterol HFA as needed  - Cont vit D supplementation  - From a pulmonary perspective, can wean off prednisone- although would check with optho as she was initially started on it for optic neuritis   - f/u with Dr Oconnor ENT as outpatient for further workup of ? VCD  - Discussed SLP and cognitive behavioral therapy for VCD with patient, she is agreeable, can be coordinated as outpatient  - Pt to followup with Dr Herrera in pulm clinic 10/4/19  - Cont PPI and ranitidine      Weakness of both lower extremities- (present on admission)  Assessment & Plan  In setting of suspected underlying metabolic disorder, chronic lactic acidosis    Plan:  - check CPK, B1 level (ordered)  - start MVI  - agree with PT/OT eval prior to discharge  - encouraged re-establishing care with metabolic disorders clinic at tertiary center for further investigation, high suspicion for underlying metabolic/genetic syndrome    TONYA (obstructive sleep apnea)- (present on admission)  Assessment & Plan  Mild per most recent sleep study  Minimize narcotic and sedating medications    Morbidly obese (HCC)- (present on admission)  Assessment & Plan  She gained about 20lbs since starting prednisone last year, however she was still ~260lbs prior to prednisone  Seen at [facility] for metabolic disorder    Plan:  - Encourage behavioral and dietary modification for weight loss and wean prednisone as outpatient  - Nutritionist consultation    Optic neuritis  Assessment & Plan  On CellCept and  prednisone taper    GERD (gastroesophageal reflux disease)- (present on admission)  Assessment & Plan  Keep head of bed upright  Pepcid  GERD education provided    Anxiety- (present on admission)  Assessment & Plan  Continue home regimen    VTE:  Lovenox  Ulcer: H2 Antagonist and PPI  Lines: None    I have performed a physical exam and reviewed and updated ROS and Plan today (9/29/2019). In review of yesterday's note (9/28/2019), there are no changes except as documented above.     Discussed patient condition and risk of morbidity and/or mortality with Hospitalist, RN and Patient  __________  Rob Bernal MD  Pulmonary and Critical Care Medicine  [facility]

## 2019-09-29 NOTE — CARE PLAN
Problem: Communication  Goal: The ability to communicate needs accurately and effectively will improve  Outcome: PROGRESSING AS EXPECTED     Problem: Safety  Goal: Will remain free from injury  Outcome: PROGRESSING AS EXPECTED  Goal: Will remain free from falls  Outcome: PROGRESSING AS EXPECTED: Pt uses call light appropriately.      Problem: Infection  Goal: Will remain free from infection  Outcome: PROGRESSING AS EXPECTED     Problem: Bowel/Gastric:  Goal: Normal bowel function is maintained or improved  Outcome: PROGRESSING AS EXPECTED: Pt. Having regular BMs  Goal: Will not experience complications related to bowel motility  Outcome: PROGRESSING AS EXPECTED     Problem: Respiratory:  Goal: Respiratory status will improve  Outcome: PROGRESSING AS EXPECTED: Pt has returned to baseline home O2.

## 2019-09-29 NOTE — ASSESSMENT & PLAN NOTE
In setting of suspected underlying metabolic disorder, chronic lactic acidosis    Plan:  - check CPK, B1 level (ordered)  - start MVI  - agree with PT/OT eval prior to discharge  - encouraged re-establishing care with metabolic disorders clinic at tertiary center for further investigation, high suspicion for underlying metabolic/genetic syndrome

## 2019-09-29 NOTE — CARE PLAN
Problem: Communication  Goal: The ability to communicate needs accurately and effectively will improve  Outcome: PROGRESSING AS EXPECTED     Problem: Safety  Goal: Will remain free from injury  Outcome: PROGRESSING AS EXPECTED  Goal: Will remain free from falls  Outcome: PROGRESSING AS EXPECTED     Problem: Respiratory:  Goal: Respiratory status will improve  Outcome: PROGRESSING AS EXPECTED     Problem: Pain Management  Goal: Pain level will decrease to patient's comfort goal  Outcome: PROGRESSING AS EXPECTED

## 2019-09-29 NOTE — PROGRESS NOTES
Hospital Medicine Daily Progress Note    Date of Service  9/29/2019    Chief Complaint  28 y.o. female admitted 9/25/2019 with wheezing.    Hospital Course    Ms. Balderrama has a past medical history of optic neuritis as well as asthma.  She was recently discharged from [facility] due to asthma exacerbation and is on a prednisone taper now at 10 mg daily.  She presented to the emergency room with complaints of stridor and wheezing that started the morning of presentation.  Given the degree of her symptoms, she has been admitted to the intensive care unit with critical care consultation.  Baseline oxygen needs 2-4 L  Patient's pulmonary doctor is Dr Herrera, has follow-up with him on 10/4, patient's ENT doctor is Dr Oconnor         Interval Problem Update  Shortness of breath slightly better.  Patient reports generalized weakness.  Intermittent tremors, anxious.  Hypokalemia potassium 3.3, I am replacing, continue to monitor.  Requiring 3 L of oxygen to achieve adequate saturation, encourage incentive spirometer, try to wean off as able.  Pulmonary following.  Patient is very weak, PT/OT ordered, pending  Discussed with patient, patient's nurse and charge nurse.       Consultants/Specialty  Critical care.   Pulmonary    Code Status  Full    Disposition  TBD, pending PT/OT    Review of Systems  Review of Systems   Constitutional: Positive for malaise/fatigue. Negative for chills and fever.   HENT: Negative for sinus pain and sore throat.    Eyes: Negative for blurred vision and photophobia.   Respiratory: Positive for shortness of breath (Improving) and wheezing (Improving). Negative for stridor.         Stridor   Cardiovascular: Negative for chest pain and leg swelling.   Gastrointestinal: Positive for abdominal pain. Negative for constipation, diarrhea and nausea.   Genitourinary: Negative for dysuria, hematuria and urgency.   Musculoskeletal: Positive for back pain, joint pain, myalgias and neck pain.    Neurological: Positive for tremors. Negative for tingling, focal weakness and headaches.   Psychiatric/Behavioral: Negative for depression. The patient is not nervous/anxious (improving ).         Physical Exam  Temp:  [36.2 °C (97.2 °F)-36.7 °C (98 °F)] 36.6 °C (97.8 °F)  Pulse:  [101-115] 101  Resp:  [16-24] 20  BP: (110-130)/(64-71) 130/69  SpO2:  [90 %-96 %] 93 %    Physical Exam   Constitutional: She is oriented to person, place, and time. No distress.   Obese, moon face    HENT:   Head: Normocephalic and atraumatic.   Mouth/Throat: Oropharynx is clear and moist.   Eyes: Right eye exhibits no discharge. Left eye exhibits no discharge. No scleral icterus.   Neck: Neck supple. No JVD present. No thyromegaly present.   Cardiovascular: Normal rate and regular rhythm.   No murmur heard.  Pulmonary/Chest: Effort normal and breath sounds normal. No stridor. She has no wheezes. She has no rales.   Abdominal: Soft. She exhibits no distension. There is tenderness.   Musculoskeletal: Normal range of motion. She exhibits tenderness.   B/l LE strength is 3/5   Lymphadenopathy:     She has no cervical adenopathy.   Neurological: She is alert and oriented to person, place, and time. She exhibits normal muscle tone.   Skin: Skin is warm and dry. She is not diaphoretic. There is pallor.   Psychiatric: She has a normal mood and affect. Her behavior is normal.   Anxious   Nursing note and vitals reviewed.      Fluids    Intake/Output Summary (Last 24 hours) at 9/29/2019 1353  Last data filed at 9/29/2019 1030  Gross per 24 hour   Intake 1680 ml   Output 610 ml   Net 1070 ml       Laboratory  Recent Labs     09/29/19  0148   WBC 7.2   RBC 4.16*   HEMOGLOBIN 13.3   HEMATOCRIT 40.3   MCV 96.9   MCH 32.0   MCHC 33.0*   RDW 45.6   PLATELETCT 161*   MPV 11.2     Recent Labs     09/29/19  0148   SODIUM 140   POTASSIUM 3.3*   CHLORIDE 100   CO2 26   GLUCOSE 109*   BUN 20   CREATININE 0.97   CALCIUM 9.6                    Imaging  DX-NECK FOR SOFT TISSUE   Final Result    Examination limited by patient body habitus.   No prevertebral/retropharyngeal edema is identified.         DX-CHEST-PORTABLE (1 VIEW)   Final Result      No acute cardiopulmonary process is seen.           Assessment/Plan  * Chronic respiratory failure with hypoxia, on home oxygen therapy ([facility])- (present on admission)  Assessment & Plan  Patient is maintained on montelukast and Spiriva  She has been on high flow oxygen  Her baseline oxygen is 2-4 L NC  O2 and respiratory protocols  Pulmonology consulted  Continue prednisone  Patient's pulmonary doctor is Dr Herrera, has follow-up with him on 10/4     Stridor  Assessment & Plan  This is been waxing and waning  FEES, concerning for vocal cord dysfunction, her ENT doctor is Dr Oconnor, F/U outpt     Weakness of both lower extremities- (present on admission)  Assessment & Plan  Multifactorial, obesity, shortness of breath  Physical and occupational therapy evaluation     Hypokalemia- (present on admission)  Assessment & Plan  Replacing, checking magnesium   Continue to monitor replace as needed     TONYA (obstructive sleep apnea)- (present on admission)  Assessment & Plan  Followed outpatient by pulmonology.  Currently not on CPAP    Morbidly obese ([facility])- (present on admission)  Assessment & Plan  BMI 47     H/O prior ablation treatment- (present on admission)  Assessment & Plan  Patient is a previous history of SVT and is status post ablation.  Continue Ivabradine    Lower abdominal pain  Assessment & Plan  Suprapubic, UA on 9/25 showed some WBC, will send for cultures  Monitor      Optic neuritis  Assessment & Plan  Continue CellCept  Followed by Dr. Yousif      Schizophrenia ([facility])  Assessment & Plan  Continue Geodon, Buspar, and Prozac    GERD (gastroesophageal reflux disease)- (present on admission)  Assessment & Plan  Continue H2    Anxiety- (present on admission)  Assessment & Plan  Continue the patient's  outpatient psychiatric medication regimen      VTE prophylaxis: lovenox

## 2019-09-29 NOTE — THERAPY
"Physical Therapy Evaluation completed.   Bed Mobility:  Supine to Sit: Moderate Assist  Transfers: Sit to Stand: Contact Guard Assist  Gait: Level Of Assist: Minimal Assist with Front-Wheel Walker       Plan of Care: Will benefit from Physical Therapy 2 times per week  Discharge Recommendations: Equipment: No Equipment Needed. Post-acute therapy Discharge to a transitional care facility for continued skilled therapy services.    See \"Rehab Therapy-Acute\" Patient Summary Report for complete documentation.       Pt is a 27 y/o female presenting to acute setting with wheezing.  Pt very verbose and self limiting immediately upon therapy entering room.  She was only willing to perform EOB activity as she was \"certain to fall.\"  Pt was able to perform 6 sit to stand transfers up to FWW, initially very fast and erratically but was able to control pacing with verbal cuing.  Once in standing, Pt demonstrated full body shaking in a semi squat position for several minutes before quickly sitting back down onto the bed.  With each sit to stand, the shaking decreased to eventually nothing and Pt was able to perform lateral side stepping with FWW up the bed once verbal cued and sequencing provided to complete task.  Pts mobility improves once distracted from task at hand if Pt is engaged with another individual or having a conversation.  At this time, Pt is not safe to return home demonstrating her current mobility level and safety awareness.  Recommend DC to post acute transitional care setting for continued therapy before attempting to return home.  PT to continue working with Pt in acute setting to maximize functional mobility and safety with activity.   "

## 2019-09-30 PROBLEM — R53.81 DEBILITY: Status: ACTIVE | Noted: 2019-09-30

## 2019-09-30 LAB
ANION GAP SERPL CALC-SCNC: 12 MMOL/L (ref 0–11.9)
BUN SERPL-MCNC: 23 MG/DL (ref 8–22)
CALCIUM SERPL-MCNC: 9.5 MG/DL (ref 8.5–10.5)
CHLORIDE SERPL-SCNC: 100 MMOL/L (ref 96–112)
CO2 SERPL-SCNC: 26 MMOL/L (ref 20–33)
CREAT SERPL-MCNC: 0.91 MG/DL (ref 0.5–1.4)
GLUCOSE BLD-MCNC: 125 MG/DL (ref 65–99)
GLUCOSE BLD-MCNC: 135 MG/DL (ref 65–99)
GLUCOSE BLD-MCNC: 177 MG/DL (ref 65–99)
GLUCOSE SERPL-MCNC: 127 MG/DL (ref 65–99)
MAGNESIUM SERPL-MCNC: 2.1 MG/DL (ref 1.5–2.5)
PHOSPHATE SERPL-MCNC: 4 MG/DL (ref 2.5–4.5)
POTASSIUM SERPL-SCNC: 3 MMOL/L (ref 3.6–5.5)
SODIUM SERPL-SCNC: 138 MMOL/L (ref 135–145)

## 2019-09-30 PROCEDURE — 770006 HCHG ROOM/CARE - MED/SURG/GYN SEMI*

## 2019-09-30 PROCEDURE — 83735 ASSAY OF MAGNESIUM: CPT

## 2019-09-30 PROCEDURE — 700111 HCHG RX REV CODE 636 W/ 250 OVERRIDE (IP): Performed by: HOSPITALIST

## 2019-09-30 PROCEDURE — 99232 SBSQ HOSP IP/OBS MODERATE 35: CPT | Performed by: HOSPITALIST

## 2019-09-30 PROCEDURE — 700102 HCHG RX REV CODE 250 W/ 637 OVERRIDE(OP): Performed by: INTERNAL MEDICINE

## 2019-09-30 PROCEDURE — 82962 GLUCOSE BLOOD TEST: CPT | Mod: 91

## 2019-09-30 PROCEDURE — 94640 AIRWAY INHALATION TREATMENT: CPT

## 2019-09-30 PROCEDURE — 700111 HCHG RX REV CODE 636 W/ 250 OVERRIDE (IP): Performed by: INTERNAL MEDICINE

## 2019-09-30 PROCEDURE — 99232 SBSQ HOSP IP/OBS MODERATE 35: CPT | Performed by: INTERNAL MEDICINE

## 2019-09-30 PROCEDURE — 36415 COLL VENOUS BLD VENIPUNCTURE: CPT

## 2019-09-30 PROCEDURE — A9270 NON-COVERED ITEM OR SERVICE: HCPCS | Performed by: HOSPITALIST

## 2019-09-30 PROCEDURE — 80048 BASIC METABOLIC PNL TOTAL CA: CPT

## 2019-09-30 PROCEDURE — 84100 ASSAY OF PHOSPHORUS: CPT

## 2019-09-30 PROCEDURE — 94760 N-INVAS EAR/PLS OXIMETRY 1: CPT

## 2019-09-30 PROCEDURE — 97166 OT EVAL MOD COMPLEX 45 MIN: CPT

## 2019-09-30 PROCEDURE — A9270 NON-COVERED ITEM OR SERVICE: HCPCS | Performed by: INTERNAL MEDICINE

## 2019-09-30 PROCEDURE — 700102 HCHG RX REV CODE 250 W/ 637 OVERRIDE(OP): Performed by: HOSPITALIST

## 2019-09-30 PROCEDURE — 700101 HCHG RX REV CODE 250: Performed by: INTERNAL MEDICINE

## 2019-09-30 RX ORDER — ALBUTEROL SULFATE 90 UG/1
2 AEROSOL, METERED RESPIRATORY (INHALATION)
Status: DISCONTINUED | OUTPATIENT
Start: 2019-09-30 | End: 2019-10-01 | Stop reason: HOSPADM

## 2019-09-30 RX ORDER — IPRATROPIUM BROMIDE AND ALBUTEROL SULFATE 2.5; .5 MG/3ML; MG/3ML
3 SOLUTION RESPIRATORY (INHALATION)
Status: DISCONTINUED | OUTPATIENT
Start: 2019-09-30 | End: 2019-10-01 | Stop reason: HOSPADM

## 2019-09-30 RX ORDER — POTASSIUM CHLORIDE 20 MEQ/1
40 TABLET, EXTENDED RELEASE ORAL ONCE
Status: COMPLETED | OUTPATIENT
Start: 2019-09-30 | End: 2019-09-30

## 2019-09-30 RX ADMIN — ASPIRIN 81 MG: 81 TABLET, COATED ORAL at 05:37

## 2019-09-30 RX ADMIN — ZIPRASIDONE HYDROCHLORIDE 80 MG: 80 CAPSULE ORAL at 05:36

## 2019-09-30 RX ADMIN — TRAMADOL HYDROCHLORIDE 50 MG: 50 TABLET ORAL at 07:54

## 2019-09-30 RX ADMIN — ENOXAPARIN SODIUM 40 MG: 100 INJECTION SUBCUTANEOUS at 17:23

## 2019-09-30 RX ADMIN — TRAMADOL HYDROCHLORIDE 50 MG: 50 TABLET ORAL at 13:59

## 2019-09-30 RX ADMIN — ONDANSETRON 4 MG: 2 INJECTION INTRAMUSCULAR; INTRAVENOUS at 04:07

## 2019-09-30 RX ADMIN — MYCOPHENOLATE MOFETIL 1000 MG: 250 CAPSULE ORAL at 05:36

## 2019-09-30 RX ADMIN — BUSPIRONE HYDROCHLORIDE 10 MG: 10 TABLET ORAL at 17:24

## 2019-09-30 RX ADMIN — THERA TABS 1 TABLET: TAB at 05:37

## 2019-09-30 RX ADMIN — ALBUTEROL SULFATE 2 PUFF: 90 AEROSOL, METERED RESPIRATORY (INHALATION) at 01:31

## 2019-09-30 RX ADMIN — POTASSIUM CHLORIDE 40 MEQ: 1500 TABLET, EXTENDED RELEASE ORAL at 08:01

## 2019-09-30 RX ADMIN — MONTELUKAST 10 MG: 10 TABLET, FILM COATED ORAL at 05:37

## 2019-09-30 RX ADMIN — TRAMADOL HYDROCHLORIDE 50 MG: 50 TABLET ORAL at 22:49

## 2019-09-30 RX ADMIN — BUDESONIDE AND FORMOTEROL FUMARATE DIHYDRATE 2 PUFF: 160; 4.5 AEROSOL RESPIRATORY (INHALATION) at 05:42

## 2019-09-30 RX ADMIN — TRAZODONE HYDROCHLORIDE 100 MG: 50 TABLET ORAL at 19:47

## 2019-09-30 RX ADMIN — SODIUM BICARBONATE 650 MG: 650 TABLET ORAL at 05:37

## 2019-09-30 RX ADMIN — ACETAMINOPHEN 650 MG: 325 TABLET, FILM COATED ORAL at 12:04

## 2019-09-30 RX ADMIN — PREGABALIN 300 MG: 150 CAPSULE ORAL at 17:24

## 2019-09-30 RX ADMIN — VITAMIN D, TAB 1000IU (100/BT) 1000 UNITS: 25 TAB at 05:37

## 2019-09-30 RX ADMIN — FOLIC ACID 1 MG: 1 TABLET ORAL at 05:37

## 2019-09-30 RX ADMIN — BUSPIRONE HYDROCHLORIDE 10 MG: 10 TABLET ORAL at 05:37

## 2019-09-30 RX ADMIN — POLYETHYLENE GLYCOL 3350 1 PACKET: 17 POWDER, FOR SOLUTION ORAL at 15:21

## 2019-09-30 RX ADMIN — ACETAMINOPHEN 650 MG: 325 TABLET, FILM COATED ORAL at 04:07

## 2019-09-30 RX ADMIN — IPRATROPIUM BROMIDE AND ALBUTEROL SULFATE 3 ML: .5; 3 SOLUTION RESPIRATORY (INHALATION) at 15:23

## 2019-09-30 RX ADMIN — SENNOSIDES, DOCUSATE SODIUM 2 TABLET: 50; 8.6 TABLET, FILM COATED ORAL at 05:37

## 2019-09-30 RX ADMIN — GABAPENTIN 300 MG: 300 CAPSULE ORAL at 19:49

## 2019-09-30 RX ADMIN — FAMOTIDINE 20 MG: 20 TABLET ORAL at 05:37

## 2019-09-30 RX ADMIN — LORAZEPAM 1 MG: 1 TABLET ORAL at 15:41

## 2019-09-30 RX ADMIN — MYCOPHENOLATE MOFETIL 1000 MG: 250 CAPSULE ORAL at 17:30

## 2019-09-30 RX ADMIN — INSULIN HUMAN 2 UNITS: 100 INJECTION, SOLUTION PARENTERAL at 08:46

## 2019-09-30 RX ADMIN — GABAPENTIN 300 MG: 300 CAPSULE ORAL at 12:04

## 2019-09-30 RX ADMIN — FLUOXETINE HYDROCHLORIDE 40 MG: 20 CAPSULE ORAL at 10:52

## 2019-09-30 RX ADMIN — FUROSEMIDE 80 MG: 40 TABLET ORAL at 05:37

## 2019-09-30 RX ADMIN — LEVOTHYROXINE SODIUM 75 MCG: 75 TABLET ORAL at 05:37

## 2019-09-30 RX ADMIN — SENNOSIDES, DOCUSATE SODIUM 2 TABLET: 50; 8.6 TABLET, FILM COATED ORAL at 17:24

## 2019-09-30 RX ADMIN — TRAMADOL HYDROCHLORIDE 50 MG: 50 TABLET ORAL at 01:22

## 2019-09-30 RX ADMIN — ENOXAPARIN SODIUM 40 MG: 100 INJECTION SUBCUTANEOUS at 05:37

## 2019-09-30 RX ADMIN — ACETAMINOPHEN 650 MG: 325 TABLET, FILM COATED ORAL at 19:47

## 2019-09-30 RX ADMIN — METHOCARBAMOL TABLETS 750 MG: 750 TABLET, COATED ORAL at 07:54

## 2019-09-30 RX ADMIN — GABAPENTIN 300 MG: 300 CAPSULE ORAL at 17:24

## 2019-09-30 RX ADMIN — IPRATROPIUM BROMIDE AND ALBUTEROL SULFATE 3 ML: .5; 3 SOLUTION RESPIRATORY (INHALATION) at 11:47

## 2019-09-30 RX ADMIN — BUDESONIDE AND FORMOTEROL FUMARATE DIHYDRATE 2 PUFF: 160; 4.5 AEROSOL RESPIRATORY (INHALATION) at 17:31

## 2019-09-30 RX ADMIN — METHOCARBAMOL TABLETS 750 MG: 750 TABLET, COATED ORAL at 19:47

## 2019-09-30 RX ADMIN — ZIPRASIDONE HYDROCHLORIDE 80 MG: 80 CAPSULE ORAL at 17:24

## 2019-09-30 RX ADMIN — IPRATROPIUM BROMIDE AND ALBUTEROL SULFATE 3 ML: .5; 3 SOLUTION RESPIRATORY (INHALATION) at 07:28

## 2019-09-30 RX ADMIN — ONDANSETRON 4 MG: 2 INJECTION INTRAMUSCULAR; INTRAVENOUS at 12:04

## 2019-09-30 RX ADMIN — PREDNISONE 20 MG: 20 TABLET ORAL at 05:37

## 2019-09-30 RX ADMIN — METHOCARBAMOL TABLETS 750 MG: 750 TABLET, COATED ORAL at 13:59

## 2019-09-30 RX ADMIN — PREGABALIN 300 MG: 150 CAPSULE ORAL at 05:36

## 2019-09-30 RX ADMIN — IPRATROPIUM BROMIDE AND ALBUTEROL SULFATE 3 ML: .5; 3 SOLUTION RESPIRATORY (INHALATION) at 19:38

## 2019-09-30 RX ADMIN — TIOTROPIUM BROMIDE 1 CAPSULE: 18 CAPSULE ORAL; RESPIRATORY (INHALATION) at 05:42

## 2019-09-30 RX ADMIN — GABAPENTIN 300 MG: 300 CAPSULE ORAL at 07:55

## 2019-09-30 RX ADMIN — FAMOTIDINE 20 MG: 20 TABLET ORAL at 17:24

## 2019-09-30 RX ADMIN — SODIUM BICARBONATE 650 MG: 650 TABLET ORAL at 17:24

## 2019-09-30 RX ADMIN — ALBUTEROL SULFATE 2 PUFF: 90 AEROSOL, METERED RESPIRATORY (INHALATION) at 04:39

## 2019-09-30 ASSESSMENT — ENCOUNTER SYMPTOMS
BLURRED VISION: 0
LOSS OF CONSCIOUSNESS: 0
STRIDOR: 0
VOMITING: 0
PALPITATIONS: 0
MEMORY LOSS: 0
PHOTOPHOBIA: 0
EYE PAIN: 0
SPUTUM PRODUCTION: 0
TREMORS: 0
DEPRESSION: 0
COUGH: 1
CHILLS: 0
FALLS: 1
SINUS PAIN: 0
TINGLING: 0
NERVOUS/ANXIOUS: 1
INSOMNIA: 0
HEMOPTYSIS: 0
HEADACHES: 0
POLYDIPSIA: 0
WEIGHT LOSS: 0
NECK PAIN: 1
EYE DISCHARGE: 0
STRIDOR: 1
ORTHOPNEA: 0
HALLUCINATIONS: 0
SORE THROAT: 0
NERVOUS/ANXIOUS: 0
NAUSEA: 0
ABDOMINAL PAIN: 0
DIARRHEA: 0
DEPRESSION: 1
FEVER: 0
SHORTNESS OF BREATH: 1
MYALGIAS: 1
FOCAL WEAKNESS: 0
WHEEZING: 1
SENSORY CHANGE: 0
SORE THROAT: 1
BRUISES/BLEEDS EASILY: 1
BACK PAIN: 1
CONSTIPATION: 0
DIZZINESS: 0

## 2019-09-30 ASSESSMENT — COGNITIVE AND FUNCTIONAL STATUS - GENERAL
HELP NEEDED FOR BATHING: A LOT
DRESSING REGULAR LOWER BODY CLOTHING: A LOT
SUGGESTED CMS G CODE MODIFIER DAILY ACTIVITY: CK
DRESSING REGULAR UPPER BODY CLOTHING: A LITTLE
DAILY ACTIVITIY SCORE: 18
TOILETING: A LITTLE

## 2019-09-30 ASSESSMENT — PATIENT HEALTH QUESTIONNAIRE - PHQ9
SUM OF ALL RESPONSES TO PHQ9 QUESTIONS 1 AND 2: 0
2. FEELING DOWN, DEPRESSED, IRRITABLE, OR HOPELESS: NOT AT ALL
1. LITTLE INTEREST OR PLEASURE IN DOING THINGS: NOT AT ALL

## 2019-09-30 ASSESSMENT — LIFESTYLE VARIABLES: SUBSTANCE_ABUSE: 0

## 2019-09-30 ASSESSMENT — PAIN SCALES - WONG BAKER
WONGBAKER_NUMERICALRESPONSE: DOESN'T HURT AT ALL
WONGBAKER_NUMERICALRESPONSE: DOESN'T HURT AT ALL

## 2019-09-30 ASSESSMENT — ACTIVITIES OF DAILY LIVING (ADL): TOILETING: REQUIRES ASSIST

## 2019-09-30 NOTE — DISCHARGE PLANNING
Anticipated Discharge Disposition: Skilled    Action: patient is aware of the recommendations of PT/OT and is willing to go.  Patient selected [facility], wants to be close to her home. As her grandmother doesn't like to drive much.   Faxed Choice to [facility]  PASRR: [id number]  LOC: [phone]      Barriers to Discharge: acceptance    Plan: follow up with [facility] for acceptance

## 2019-09-30 NOTE — PROGRESS NOTES
Received report from day shift RN. Patient sitting up in wheelchair near nurse's station eating supper. Denies pain. Discussed plan of care for the night.

## 2019-09-30 NOTE — WOUND TEAM
In to see patient for wound consult of left lower ABD scab.  Patient states it is originally from a cat scratch but is taking a long time to heal.  Helped patient back to her room, area assessed, small scab 1x1.5.  Mild erythema, no drainage.  Patient is worried about healing and infection.  Cleansed with NS and gauze gently, covered with adhesive foam.  Will write order for dressing change.  No advanced wound care needs at this time.

## 2019-09-30 NOTE — DISCHARGE PLANNING
Received Choice form at [phone]  Agency/Facility Name: [facility]  Referral sent per Choice form @ [phone]

## 2019-09-30 NOTE — PROGRESS NOTES
"Patient continues with complaints of SOA, requesting nebulizer treatment. Spoke with RT x2. Per RT patient was reevaluated and order for routine nebulizer treatments was discontinued. Patient still has PRN order, however RT is attempting to wean patient down on amount of treatments she is receiving. Ordered PRN albuterol inhaler instead. Spoke with patient regarding matter. Patient stated \"It's not going to work, but whatever I'll try it.\" Ordered from pharmacy at this time.   "

## 2019-09-30 NOTE — PROGRESS NOTES
Bedside report received from off-going night shift nurse at this time. Patient is calm, alert, in bed. Denies pain. Care plan discussed with off-going nurse, and patient involvement. Patient questions revolving care plan addressed, denies further questions of the care plan at this time.     Bed in low position, wheels locked, call light within reach of patient. Room tidy, and floor is free of clutter.

## 2019-09-30 NOTE — DISCHARGE PLANNING
Agency/Facility Name: [facility]  Spoke To: Kj  Outcome: Patient referral declined due to no private rooms and care exceeds capacity.    Agency[facility]Facility Name: [facility]  Spoke To: Trini  Outcome: Patient referral received, under review.    Agency/Facility Name: [facility]  Spoke To: Ness  Outcome: Patient referral received.  Ness will call back with update.

## 2019-09-30 NOTE — PROGRESS NOTES
Hospital Medicine Daily Progress Note    Date of Service  9/30/2019    Chief Complaint  28 y.o. female admitted 9/25/2019 with wheezing.    Hospital Course        24 years old female with past medical history of obesity, optic neuritis, poorly controlled asthma on steroids, multiple admissions for asthma exacerbation, recently discharged from [facility] for the same.  Admitted September 25 with stridor and wheezing, initially admitted to the intensive care unit with critical care consultation.  Her baseline oxygen needs 2-4 L.  Patient's pulmonary doctor is Dr Herrera, has follow-up with him on 10/4, patient's ENT doctor is Dr Oconnor.          Interval Problem Update  Heart rate mostly 90s, requiring 3 L of oxygen to achieve adequate saturation, encourage incentive spirometer, try to wean off as tolerable.  Encourage ambulation.  Her weakness is better today.  She is agreeable to go to skilled nursing facility, I discussed with care coordination, working on choice.  Hypokalemia, potassium of 3, I am replacing, continue to monitor, check magnesium.  Magnesium 2.1.  Blood sugars 135-177  Discussed with patient, patient's nurse and with multidisciplinary team during rounds including , and charge nurse.     Consultants/Specialty  Critical care.   Pulmonary    Code Status  Full    Disposition  SNF, after being cleared by pulmonary    Review of Systems  Review of Systems   Constitutional: Positive for malaise/fatigue. Negative for chills and fever.   HENT: Negative for sinus pain and sore throat.    Eyes: Negative for blurred vision and photophobia.   Respiratory: Positive for shortness of breath (Improving) and wheezing (Improving). Negative for stridor.         Stridor   Cardiovascular: Negative for chest pain and leg swelling.   Gastrointestinal: Negative for abdominal pain (Resolved), constipation, diarrhea and nausea.   Genitourinary: Negative for dysuria, hematuria and urgency.    Musculoskeletal: Positive for back pain, joint pain, myalgias and neck pain.   Neurological: Negative for tingling, tremors (Improved), focal weakness and headaches.   Psychiatric/Behavioral: Negative for depression. The patient is not nervous/anxious (improving ).         Physical Exam  Temp:  [36.2 °C (97.2 °F)-37.2 °C (99 °F)] 36.3 °C (97.3 °F)  Pulse:  [] 96  Resp:  [16-20] 20  BP: (123-149)/(75-84) 141/80  SpO2:  [92 %-98 %] 98 %    Physical Exam   Constitutional: She is oriented to person, place, and time. No distress.   Obese, moon face    HENT:   Head: Normocephalic and atraumatic.   Mouth/Throat: Oropharynx is clear and moist.   Eyes: Right eye exhibits no discharge. Left eye exhibits no discharge. No scleral icterus.   Neck: Neck supple. No JVD present. No thyromegaly present.   Cardiovascular: Normal rate and regular rhythm.   No murmur heard.  Pulmonary/Chest: Effort normal and breath sounds normal. No stridor. She has no wheezes. She has no rales.   Abdominal: Soft. She exhibits no distension. There is tenderness.   Musculoskeletal: Normal range of motion.   B/l LE strength is 3/5   Lymphadenopathy:     She has no cervical adenopathy.   Neurological: She is alert and oriented to person, place, and time. She exhibits normal muscle tone.   Skin: Skin is warm and dry. She is not diaphoretic. There is pallor.   Psychiatric: She has a normal mood and affect. Her behavior is normal.   Anxious   Nursing note and vitals reviewed.      Fluids    Intake/Output Summary (Last 24 hours) at 9/30/2019 1453  Last data filed at 9/30/2019 1409  Gross per 24 hour   Intake 1860 ml   Output --   Net 1860 ml       Laboratory  Recent Labs     09/29/19 0148   WBC 7.2   RBC 4.16*   HEMOGLOBIN 13.3   HEMATOCRIT 40.3   MCV 96.9   MCH 32.0   MCHC 33.0*   RDW 45.6   PLATELETCT 161*   MPV 11.2     Recent Labs     09/29/19  0148 09/30/19  0232   SODIUM 140 138   POTASSIUM 3.3* 3.0*   CHLORIDE 100 100   CO2 26 26   GLUCOSE  109* 127*   BUN 20 23*   CREATININE 0.97 0.91   CALCIUM 9.6 9.5                   Imaging  DX-NECK FOR SOFT TISSUE   Final Result    Examination limited by patient body habitus.   No prevertebral/retropharyngeal edema is identified.         DX-CHEST-PORTABLE (1 VIEW)   Final Result      No acute cardiopulmonary process is seen.           Assessment/Plan  * Chronic respiratory failure with hypoxia, on home oxygen therapy (HCC)- (present on admission)  Assessment & Plan  Patient is maintained on montelukast and Spiriva  She has been on high flow oxygen  Her baseline oxygen is 2-4 L NC  O2 and respiratory protocols  Pulmonology consulted  Continue prednisone  Patient's pulmonary doctor is Dr Herrera, has follow-up with him on 10/4     Stridor  Assessment & Plan  This is been waxing and waning  FEES, concerning for vocal cord dysfunction, her ENT doctor is Dr Oconnor, F/U outpt     Weakness of both lower extremities- (present on admission)  Assessment & Plan  Multifactorial, obesity, shortness of breath  Physical and occupational therapy evaluation > SNF     Hypokalemia- (present on admission)  Assessment & Plan  Replacing, Mg WNL 9/30/2019   Continue to monitor replace as needed     TONYA (obstructive sleep apnea)- (present on admission)  Assessment & Plan  Followed outpatient by pulmonology.  Currently not on CPAP    Morbidly obese (HCC)- (present on admission)  Assessment & Plan  BMI 47     H/O prior ablation treatment- (present on admission)  Assessment & Plan  Patient is a previous history of SVT and is status post ablation.  Continue Ivabradine    Debility- (present on admission)  Assessment & Plan  Multifactorial, age, obesity, asthma exacerbation   Physical and occupational therapy evaluation     Lower abdominal pain  Assessment & Plan  Suprapubic, UA on 9/25 showed some WBC, will send for cultures  Monitor      Optic neuritis  Assessment & Plan  Continue CellCept  Followed by Dr. Yousif      Schizophrenia  ([facility])  Assessment & Plan  Continue Geodon, Buspar, and Prozac    GERD (gastroesophageal reflux disease)- (present on admission)  Assessment & Plan  Continue H2    Anxiety- (present on admission)  Assessment & Plan  Continue the patient's outpatient psychiatric medication regimen      VTE prophylaxis: lovenox

## 2019-09-30 NOTE — CARE PLAN
Problem: Communication  Goal: The ability to communicate needs accurately and effectively will improve  Outcome: MET     Problem: Safety  Goal: Will remain free from injury  Outcome: MET

## 2019-09-30 NOTE — CARE PLAN
Problem: Communication  Goal: The ability to communicate needs accurately and effectively will improve  Outcome: PROGRESSING AS EXPECTED     Problem: Safety  Goal: Will remain free from injury  Outcome: PROGRESSING AS EXPECTED  Goal: Will remain free from falls  Outcome: PROGRESSING AS EXPECTED     Problem: Pain Management  Goal: Pain level will decrease to patient's comfort goal  Outcome: PROGRESSING AS EXPECTED     Problem: Mobility  Goal: Risk for activity intolerance will decrease  Outcome: PROGRESSING SLOWER THAN EXPECTED

## 2019-09-30 NOTE — RESPIRATORY CARE
Pt was reassessed to QID SVN and is being seen by RT routinely, NOC treatments are PRN, will continue to monitor and assess as indicated

## 2019-10-01 VITALS
DIASTOLIC BLOOD PRESSURE: 78 MMHG | HEART RATE: 96 BPM | WEIGHT: 273.15 LBS | TEMPERATURE: 97.7 F | BODY MASS INDEX: 45.51 KG/M2 | RESPIRATION RATE: 18 BRPM | OXYGEN SATURATION: 95 % | HEIGHT: 65 IN | SYSTOLIC BLOOD PRESSURE: 128 MMHG

## 2019-10-01 LAB
ANION GAP SERPL CALC-SCNC: 13 MMOL/L (ref 0–11.9)
BUN SERPL-MCNC: 22 MG/DL (ref 8–22)
CALCIUM SERPL-MCNC: 9.4 MG/DL (ref 8.5–10.5)
CHLORIDE SERPL-SCNC: 99 MMOL/L (ref 96–112)
CO2 SERPL-SCNC: 26 MMOL/L (ref 20–33)
CREAT SERPL-MCNC: 0.89 MG/DL (ref 0.5–1.4)
GLUCOSE BLD-MCNC: 123 MG/DL (ref 65–99)
GLUCOSE BLD-MCNC: 160 MG/DL (ref 65–99)
GLUCOSE SERPL-MCNC: 99 MG/DL (ref 65–99)
MAGNESIUM SERPL-MCNC: 2 MG/DL (ref 1.5–2.5)
POTASSIUM SERPL-SCNC: 3 MMOL/L (ref 3.6–5.5)
SODIUM SERPL-SCNC: 138 MMOL/L (ref 135–145)

## 2019-10-01 PROCEDURE — 80048 BASIC METABOLIC PNL TOTAL CA: CPT

## 2019-10-01 PROCEDURE — A9270 NON-COVERED ITEM OR SERVICE: HCPCS | Performed by: HOSPITALIST

## 2019-10-01 PROCEDURE — 82962 GLUCOSE BLOOD TEST: CPT

## 2019-10-01 PROCEDURE — 700111 HCHG RX REV CODE 636 W/ 250 OVERRIDE (IP): Performed by: INTERNAL MEDICINE

## 2019-10-01 PROCEDURE — 36415 COLL VENOUS BLD VENIPUNCTURE: CPT

## 2019-10-01 PROCEDURE — 700102 HCHG RX REV CODE 250 W/ 637 OVERRIDE(OP): Performed by: HOSPITALIST

## 2019-10-01 PROCEDURE — A9270 NON-COVERED ITEM OR SERVICE: HCPCS | Performed by: INTERNAL MEDICINE

## 2019-10-01 PROCEDURE — 99239 HOSP IP/OBS DSCHRG MGMT >30: CPT | Performed by: INTERNAL MEDICINE

## 2019-10-01 PROCEDURE — 83735 ASSAY OF MAGNESIUM: CPT

## 2019-10-01 PROCEDURE — 700111 HCHG RX REV CODE 636 W/ 250 OVERRIDE (IP): Performed by: HOSPITALIST

## 2019-10-01 PROCEDURE — 700102 HCHG RX REV CODE 250 W/ 637 OVERRIDE(OP): Performed by: INTERNAL MEDICINE

## 2019-10-01 RX ORDER — ACETAMINOPHEN 325 MG/1
650 TABLET ORAL EVERY 6 HOURS PRN
Qty: 30 TAB | Refills: 0
Start: 2019-10-01 | End: 2019-11-06

## 2019-10-01 RX ORDER — TRAMADOL HYDROCHLORIDE 50 MG/1
50 TABLET ORAL EVERY 6 HOURS PRN
Qty: 5 TAB | Refills: 0 | Status: SHIPPED | OUTPATIENT
Start: 2019-10-01 | End: 2019-10-03

## 2019-10-01 RX ADMIN — METHOCARBAMOL TABLETS 750 MG: 750 TABLET, COATED ORAL at 09:14

## 2019-10-01 RX ADMIN — PREDNISONE 20 MG: 20 TABLET ORAL at 04:07

## 2019-10-01 RX ADMIN — TRAMADOL HYDROCHLORIDE 50 MG: 50 TABLET ORAL at 10:39

## 2019-10-01 RX ADMIN — GABAPENTIN 300 MG: 300 CAPSULE ORAL at 09:14

## 2019-10-01 RX ADMIN — INSULIN HUMAN 2 UNITS: 100 INJECTION, SOLUTION PARENTERAL at 12:00

## 2019-10-01 RX ADMIN — FUROSEMIDE 80 MG: 40 TABLET ORAL at 04:08

## 2019-10-01 RX ADMIN — MONTELUKAST 10 MG: 10 TABLET, FILM COATED ORAL at 04:08

## 2019-10-01 RX ADMIN — ALBUTEROL SULFATE 2 PUFF: 90 AEROSOL, METERED RESPIRATORY (INHALATION) at 00:17

## 2019-10-01 RX ADMIN — SODIUM BICARBONATE 650 MG: 650 TABLET ORAL at 04:07

## 2019-10-01 RX ADMIN — VITAMIN D, TAB 1000IU (100/BT) 1000 UNITS: 25 TAB at 04:08

## 2019-10-01 RX ADMIN — TIOTROPIUM BROMIDE 1 CAPSULE: 18 CAPSULE ORAL; RESPIRATORY (INHALATION) at 04:12

## 2019-10-01 RX ADMIN — FAMOTIDINE 20 MG: 20 TABLET ORAL at 04:08

## 2019-10-01 RX ADMIN — THERA TABS 1 TABLET: TAB at 04:07

## 2019-10-01 RX ADMIN — ENOXAPARIN SODIUM 40 MG: 100 INJECTION SUBCUTANEOUS at 04:06

## 2019-10-01 RX ADMIN — ASPIRIN 81 MG: 81 TABLET, COATED ORAL at 04:07

## 2019-10-01 RX ADMIN — FLUOXETINE HYDROCHLORIDE 40 MG: 20 CAPSULE ORAL at 04:09

## 2019-10-01 RX ADMIN — FOLIC ACID 1 MG: 1 TABLET ORAL at 04:06

## 2019-10-01 RX ADMIN — BUSPIRONE HYDROCHLORIDE 10 MG: 10 TABLET ORAL at 04:06

## 2019-10-01 RX ADMIN — ACETAMINOPHEN 650 MG: 325 TABLET, FILM COATED ORAL at 02:49

## 2019-10-01 RX ADMIN — LEVOTHYROXINE SODIUM 75 MCG: 75 TABLET ORAL at 04:08

## 2019-10-01 RX ADMIN — MYCOPHENOLATE MOFETIL 1000 MG: 250 CAPSULE ORAL at 04:09

## 2019-10-01 RX ADMIN — ONDANSETRON 4 MG: 2 INJECTION INTRAMUSCULAR; INTRAVENOUS at 06:12

## 2019-10-01 RX ADMIN — PREGABALIN 300 MG: 150 CAPSULE ORAL at 04:07

## 2019-10-01 RX ADMIN — BUDESONIDE AND FORMOTEROL FUMARATE DIHYDRATE 2 PUFF: 160; 4.5 AEROSOL RESPIRATORY (INHALATION) at 04:02

## 2019-10-01 RX ADMIN — ZIPRASIDONE HYDROCHLORIDE 80 MG: 80 CAPSULE ORAL at 04:08

## 2019-10-01 RX ADMIN — SENNOSIDES, DOCUSATE SODIUM 2 TABLET: 50; 8.6 TABLET, FILM COATED ORAL at 04:08

## 2019-10-01 NOTE — THERAPY
"Occupational Therapy Evaluation completed.   Functional Status:    29 y/o female admitted to hospital for wheezing and SOB. Patient requesting to use toilet on entry, initially wanting to use bed pan 2/2 BLE weakness, though was agreeable to BSC with encouragement. Pt completed bed mobility with Min A. When she went to stand, she did it quickly and then immediately presented with very erratic jolting movements before sitting back down. Patient guided through moving slowly and safely and was able to SPT to BSC with CGA. Cleaned self supv, and then requested to get into w/c, which she did with Min A. She donned her socks with supv and her sweater with Min A. Patient then propelled self down the carpio to eat lunch with other patients on the floor. Will follow patient 2x/wk, would recommend psych consult.     Plan of Care: Will benefit from Occupational Therapy 2 times per week  Discharge Recommendations:  Equipment: Will Continue to Assess for Equipment Needs. Post-acute therapy Recommend post-acute placement for additional occupational therapy services prior to discharge home. Patient can tolerate post-acute therapies at a 5x/week frequency.      See \"Rehab Therapy-Acute\" Patient Summary Report for complete documentation.    "

## 2019-10-01 NOTE — PROGRESS NOTES
Pulmonary Progress Note    Date of admission  9/25/2019    Date of service  9/29/2019    Chief Complaint  28 y.o. female admitted 9/25/2019 for shortness of breath. 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 She had been exposed to cigarette smoke which is a known trigger for her respiratory issues and subsequently developed wheezing and shortness of breath. Denies any current fever, chills, sweats, productive cough, CP, nausea, vomiting, or lower extremity edema.  She attempted to utilize her nebulizers without significant benefit and proceeded to come to the ED. She is currently tapering prednisone after a recent asthma exacerbation and is on chronic low-dose prednisone for the optic neuritis. Her asthma is managed on spiriva, singulair and symbicort. She recently established care with Dr Herrera in the pulmonary clinic earlier this month on 9/5/19, where in-clinic PFTs were notable for:    - FEV1 79%   - FVC 76%  - FEV1/FVC ratio of 88.   - No significant bronchodilator response.  - TLC 83%  - DLCO 85%    Vit D levels were checked and noted to be markedly low, has since been started on supplementation  IgE level was checked and normal    She had a CTA of the chest in 7/2019 showing no parenchymal abnormalities but notable for small bilateral plueral and pericardial effusions. TTE at that time was also performed and normal.    She has multiple specialists following her care:  1. ENT- Dr Oconnor  2. Cardiology- paroxysmal atrial fibrillation requiring ablations  3. GI- Mild esophageal dysmotility, undergone esophageal dilations in the past  4. Metabolic: suspected metabolic/mitochondrial  disorder due to chronic lactic acidosis investigated at [facility], pt was unable to undergo complete testing due to financial constraints and insurance issues.   5. Pulm- Dr Herrera as noted above    Hospital Course  Kristin was admitted to the ICU due to severe hypoxia requiring high-flow supplemental O2 via HFNC. She was noted to have an upper airway inspiratory wheeze with exertion that was reducible when talking/reassurance. A soft tissue neck XR and CXR were normal.     She underwent a FEES to evaluate for VCD, contributing to underlying asthma and pickwickian syndrome. During FEES had penetration of thin liquids due to delayed initiation to swallow that resulted in immediate cough and wheeze. SLP noted vocal cords were in partially abducted position at this time and maintained adequate saturations. After study she regurgitated food/liquid that was given during the study.    Interval Problem Update  Reviewed last 24 hour events:  Sinus tachycardia, stable  Supplemental O2 requirements back to baseline 3LNC  Reporting quad and calf muscle tenderness and pain, awaiting PT/OT eval  States stridor and shortness of breath at baseline  Asking to be discharged however acknowledges need to be seen by PT/OT first for safe discharge/fall risk    Review of Systems  Review of Systems   Constitutional: Negative for chills, fever and weight loss (+20lb weight gain last year since starting prednisone.).   HENT: Positive for sore throat. Negative for congestion and sinus pain.    Eyes: Negative for pain and discharge.   Respiratory: Positive for cough, shortness of breath, wheezing and stridor. Negative for hemoptysis and sputum production.    Cardiovascular: Positive for chest pain. Negative for palpitations, orthopnea and leg swelling.   Gastrointestinal: Negative for abdominal pain, diarrhea, nausea and vomiting.   Genitourinary: Negative for dysuria, frequency and urgency.   Musculoskeletal: Positive for back pain, falls, joint  pain and myalgias.   Skin: Negative for rash.   Neurological: Negative for dizziness, sensory change, focal weakness, loss of consciousness and headaches.   Endo/Heme/Allergies: Positive for environmental allergies. Negative for polydipsia. Bruises/bleeds easily.   Psychiatric/Behavioral: Positive for depression. Negative for hallucinations, memory loss, substance abuse and suicidal ideas. The patient is nervous/anxious. The patient does not have insomnia.    All other systems reviewed and are negative.     Vital Signs for last 24 hours   Temp:  [36.3 °C (97.3 °F)-37.2 °C (99 °F)] 36.7 °C (98.1 °F)  Pulse:  [] 107  Resp:  [16-20] 19  BP: (123-147)/(78-88) 147/88  SpO2:  [92 %-98 %] 95 %     Physical Exam   Physical Exam   Constitutional: She is oriented to person, place, and time. She appears well-developed and well-nourished. No distress.   obese   HENT:   Mouth/Throat: Oropharynx is clear and moist. No oropharyngeal exudate.   Eyes: Conjunctivae are normal. Right eye exhibits no discharge. Left eye exhibits no discharge. No scleral icterus.   Neck: Normal range of motion. Neck supple. No JVD present. No tracheal deviation present. No thyromegaly present.   Mild upper airway wheeze during forced expiratory maneuvers.   Cardiovascular: Regular rhythm and normal heart sounds. Exam reveals no friction rub.   No murmur heard.  Tachycardic, regular rhythm   Pulmonary/Chest: Effort normal. No stridor (no stridor during quiet tidal breathing.). No respiratory distress. She has no wheezes. She has no rales.   Abdominal: Soft. Bowel sounds are normal. She exhibits no distension. There is no tenderness. There is no rebound.   globus   Musculoskeletal: Normal range of motion. She exhibits no edema.   Lymphadenopathy:     She has no cervical adenopathy.   Neurological: She is alert and oriented to person, place, and time. No cranial nerve deficit. Coordination normal.   Skin: Skin is warm. No rash noted. No erythema.    Psychiatric:   anxious   Nursing note and vitals reviewed.    Medications  Current Facility-Administered Medications   Medication Dose Route Frequency Provider Last Rate Last Dose   • ipratropium-albuterol (DUONEB) nebulizer solution  3 mL Nebulization 4X/DAY (RT) Rob Bernal M.D.   3 mL at 09/30/19 1938   • albuterol inhaler 2 Puff  2 Puff Inhalation Q2HRS PRN Kena Pritchard M.D.   2 Puff at 09/30/19 0439   • multivitamin (THERAGRAN) tablet 1 Tab  1 Tab Oral DAILY Rob Bernal M.D.   1 Tab at 09/30/19 0537   • vitamin D (cholecalciferol) tablet 1,000 Units  1,000 Units Oral DAILY Ruth Ann Sosa M.D.   1,000 Units at 09/30/19 0537   • enoxaparin (LOVENOX) inj 40 mg  40 mg Subcutaneous Q12HRS Ruth Ann Sosa M.D.   40 mg at 09/30/19 1723   • LORazepam (ATIVAN) tablet 1 mg  1 mg Oral TID PRN Ruth Ann Sosa M.D.   1 mg at 09/30/19 1541   • ipratropium-albuterol (DUONEB) nebulizer solution  3 mL Nebulization Q4H PRN (RT) ABBY HardinOEffie   3 mL at 09/27/19 1743   • ziprasidone (GEODON) capsule 80 mg  80 mg Oral BID Ruben Renee D.O.   80 mg at 09/30/19 1724   • traZODone (DESYREL) tablet 100 mg  100 mg Oral QHS Ruben Renee D.O.   100 mg at 09/30/19 1947   • tiotropium (SPIRIVA) 18 MCG inhalation capsule 1 Cap  1 Cap Inhalation DAILY ANTONI Hardin.O.   1 Cap at 09/30/19 0542   • sodium bicarbonate tablet 650 mg  650 mg Oral BID Ruben Renee D.O.   650 mg at 09/30/19 1724   • mycophenolate (CELLCEPT) capsule 1,000 mg  1,000 mg Oral BID Ruben Renee D.O.   1,000 mg at 09/30/19 1730   • montelukast (SINGULAIR) tablet 10 mg  10 mg Oral DAILY Ruben Renee, D.O.   10 mg at 09/30/19 0537   • methocarbamol (ROBAXIN) tablet 750 mg  750 mg Oral TID Ruben Renee D.O.   750 mg at 09/30/19 1947   • pregabalin (LYRICA) capsule 300 mg  300 mg Oral BID Ruben Renee D.O.   300 mg at 09/30/19 1724   • levothyroxine  (SYNTHROID) tablet 75 mcg  75 mcg Oral AM ES Ruben Renee D.O.   75 mcg at 09/30/19 0537   • gabapentin (NEURONTIN) capsule 300 mg  300 mg Oral 4X/DAY Ruben Renee D.O.   300 mg at 09/30/19 1949   • furosemide (LASIX) tablet 80 mg  80 mg Oral DAILY Ruben Renee D.O.   80 mg at 09/30/19 0537   • folic acid (FOLVITE) tablet 1 mg  1 mg Oral DAILY Ruben Renee, D.O.   1 mg at 09/30/19 0537   • FLUoxetine (PROZAC) capsule 40 mg  40 mg Oral DAILY Ruben Renee D.O.   40 mg at 09/30/19 1052   • busPIRone (BUSPAR) tablet 10 mg  10 mg Oral BID Ruben Renee D.O.   10 mg at 09/30/19 1724   • budesonide-formoterol (SYMBICORT) 160-4.5 MCG/ACT inhaler 2 Puff  2 Puff Inhalation BID ANTONI Hardin.O.   2 Puff at 09/30/19 1731   • aspirin EC (ECOTRIN) tablet 81 mg  81 mg Oral DAILY Ruben Renee D.O.   81 mg at 09/30/19 0537   • senna-docusate (PERICOLACE or SENOKOT S) 8.6-50 MG per tablet 2 Tab  2 Tab Oral BID ANTONI Hardin.O.   2 Tab at 09/30/19 1724    And   • polyethylene glycol/lytes (MIRALAX) PACKET 1 Packet  1 Packet Oral QDAY PRN ANTONI Hardin.O.   1 Packet at 09/30/19 1521    And   • magnesium hydroxide (MILK OF MAGNESIA) suspension 30 mL  30 mL Oral QDAY PRN ANTONI Hardin.OEffie        And   • bisacodyl (DULCOLAX) suppository 10 mg  10 mg Rectal QDAY PRN ANTONI Hardin.O.       • Respiratory Care per Protocol   Nebulization Continuous RT ANTONI Hardin.O.       • acetaminophen (TYLENOL) tablet 650 mg  650 mg Oral Q6HRS PRN Ruben Renee D.O.   650 mg at 09/30/19 1947   • tramadol (ULTRAM) 50 MG tablet 50 mg  50 mg Oral Q6HRS PRN Ruben Renee D.O.   50 mg at 09/30/19 1359   • predniSONE (DELTASONE) tablet 20 mg  20 mg Oral DAILY Ruben Renee D.O.   20 mg at 09/30/19 0537   • famotidine (PEPCID) tablet 20 mg  20 mg Oral BID Ruben Renee D.O.   20  mg at 09/30/19 1724   • insulin regular (HUMULIN R) injection 2-9 Units  2-9 Units Subcutaneous 4X/DAY ACHS Ruben Renee D.O.   Stopped at 09/30/19 1100    And   • glucose 4 g chewable tablet 16 g  16 g Oral Q15 MIN PRN Ruben Renee D.O.        And   • DEXTROSE 10% BOLUS 250 mL  250 mL Intravenous Q15 MIN PRN Ruben Renee D.O.       • ondansetron (ZOFRAN) syringe/vial injection 4 mg  4 mg Intravenous Q4HRS PRN Emery Cao M.D.   4 mg at 09/30/19 1204   • Ivabradine HCl TABS 7.5 mg  7.5 mg Oral BID ANTONI [email or web address]   7.5 mg at 09/30/19 1800     Fluids    Intake/Output Summary (Last 24 hours) at 9/30/2019 2224  Last data filed at 9/30/2019 1409  Gross per 24 hour   Intake 1500 ml   Output --   Net 1500 ml     Laboratory      Recent Labs     09/29/19  1441   CPKTOTAL 22     Recent Labs     09/29/19  0148 09/30/19  0232   SODIUM 140 138   POTASSIUM 3.3* 3.0*   CHLORIDE 100 100   CO2 26 26   BUN 20 23*   CREATININE 0.97 0.91   MAGNESIUM 2.2 2.1   PHOSPHORUS  --  4.0   CALCIUM 9.6 9.5     Recent Labs     09/29/19  0148 09/30/19  0232   GLUCOSE 109* 127*     Recent Labs     09/29/19  0148   WBC 7.2   NEUTSPOLYS 51.50   LYMPHOCYTES 35.80   MONOCYTES 10.70   EOSINOPHILS 0.60   BASOPHILS 0.60     Recent Labs     09/29/19  0148   RBC 4.16*   HEMOGLOBIN 13.3   HEMATOCRIT 40.3   PLATELETCT 161*     Imaging  X-Ray:  I have personally reviewed the images and compared with prior images.  Echo:   Reviewed    Assessment/Plan  * Chronic respiratory failure with hypoxia, on home oxygen therapy (HCC)- (present on admission)  Assessment & Plan  - Asthma an unlikely explanation for her chronic O2 dependence. PFTs from earlier this month showing borderline restrictive pattern, no BD response, and borderline low DLCO.  - In setting of normal lung parenchyma seen on recent CT, this PFT pattern is suggestive of extrathoracic restriction/hypoventilation likely due to her body  habitus/pickwickian syndrome, cannot rule out a contributing neuromuscular disease. Can consider MIPS/MEPS as outpatient.  - Furthermore, the suspected underlying metabolic disorder may also be contributing to her chronic hypoxic respiratory failure due to increased tissue O2 extraction    Plan:  - Currently she is on her baseline O2 requirements, continue to titrate to maintain > 92%  - Cont incentive spirometry, mobilization, nebulizers, RT/O2 protocol  - Consider MIPS/MEPs as outpatient for aforementioned issues      Stridor  Assessment & Plan  Stridor with forced expiratory maneuvers  Reported history of asthma, however no corroborated on most recent PFTs  ? vocal cord dysfunction, although FEES normal    Plan:  - Cont symbicort/spiriva/singulair, duonebs BID and albuterol HFA as needed  - Cont vit D supplementation  - From a pulmonary perspective, can wean off prednisone- although would check with optho as she was initially started on it for optic neuritis   - f/u with Dr Oconnor ENT as outpatient for further workup of ? VCD  - Discussed SLP and cognitive behavioral therapy for VCD with patient, she is agreeable, can be coordinated as outpatient  - Pt to followup with Dr Herrera in pulm clinic 10/4/19  - Cont PPI and ranitidine      Weakness of both lower extremities- (present on admission)  Assessment & Plan  In setting of suspected underlying metabolic disorder, chronic lactic acidosis    Plan:  - check CPK, B1 level (ordered)  - start MVI  - agree with PT/OT eval prior to discharge  - encouraged re-establishing care with metabolic disorders clinic at tertiary center for further investigation, high suspicion for underlying metabolic/genetic syndrome    TONYA (obstructive sleep apnea)- (present on admission)  Assessment & Plan  Mild per most recent sleep study  Minimize narcotic and sedating medications      GERD (gastroesophageal reflux disease)- (present on admission)  Assessment & Plan  Keep head of bed  upright  Pepcid  GERD education provided      9/30  No change in assessment and plan from yesterday   Pt is planned to be discharged to SNF for rehab   I asked the patient to keep her apt with Dr Herrera in the pulmonary clinic scheduled this Friday  Probably she may benefit from speech therapy given the finding of FEES study   Cont prednisone, change to her baseline home dose. No wheezing in exam today    Pulmonary will sing off. Please call for any questions        I have performed a physical exam and reviewed and updated ROS and Plan today (9/30/2019). In review of yesterday's note (9/29/2019), there are no changes except as documented above.     Discussed patient condition and risk of morbidity and/or mortality with Hospitalist and Patient

## 2019-10-01 NOTE — DISCHARGE SUMMARY
Discharge Summary    CHIEF COMPLAINT ON ADMISSION  Chief Complaint   Patient presents with   • Shortness of Breath     since 7am. has hx of asthma. used 2 duoneb treatment w/o relief. has audible wheezing. noted increased work of breathing.        Reason for Admission  SOB      CODE STATUS  Full Code    HPI & HOSPITAL COURSE  24 years old female with past medical history of obesity, optic neuritis, poorly controlled asthma on steroids, multiple admissions for asthma exacerbation, recently discharged from [facility] for the same.  Admitted September 25 with stridor and wheezing, initially admitted to the intensive care unit with critical care consultation.  Her baseline oxygen needs 2-4 L.  Patient's pulmonary doctor is Dr Herrera, has follow-up with him on 10/4, patient's ENT doctor is Dr Oconnor.    Patient was monitored by pulmonologist and continue breathing treatment, oral steroid, and oxygen supplement.  Patient's breathing condition gradually back to baseline.  Pulmonologist recommend patient to continue follow-up as outpatient.  Continue steroid at home dose.  Detailed hospital stay please see below.    * Chronic respiratory failure with hypoxia, on home oxygen therapy (HCC)- (present on admission)  Assessment & Plan  Patient is maintained on montelukast and Spiriva  She has been on high flow oxygen  Her baseline oxygen is 2-4 L NC  O2 and respiratory protocols  Pulmonology consulted  Continue prednisone  Patient's pulmonary doctor is Dr Herrera, has follow-up with him on 10/4     Stridor- (present on admission)  Assessment & Plan  This is been waxing and waning  FEES, concerning for vocal cord dysfunction, her ENT doctor is Dr Oconnor, F/U outpt     Weakness of both lower extremities- (present on admission)  Assessment & Plan  Multifactorial, obesity, shortness of breath  Physical and occupational therapy evaluation > SNF     Hypokalemia- (present on admission)  Assessment & Plan  Replacing, Mg WNL  9/30/2019   Continue to monitor replace as needed     TONYA (obstructive sleep apnea)- (present on admission)  Assessment & Plan  Followed outpatient by pulmonology.  Currently not on CPAP    Morbidly obese (HCC)- (present on admission)  Assessment & Plan  BMI 47     H/O prior ablation treatment- (present on admission)  Assessment & Plan  Patient is a previous history of SVT and is status post ablation.  Continue Ivabradine    Debility- (present on admission)  Assessment & Plan  Multifactorial, age, obesity, asthma exacerbation   Physical and occupational therapy evaluation     Lower abdominal pain- (present on admission)  Assessment & Plan  Suprapubic, UA on 9/25 showed some WBC, will send for cultures  Monitor      Optic neuritis- (present on admission)  Assessment & Plan  Continue CellCept  Followed by Dr. Yousif      Schizophrenia ([facility])  Assessment & Plan  Continue Geodon, Buspar, and Prozac    GERD (gastroesophageal reflux disease)- (present on admission)  Assessment & Plan  Continue H2    Anxiety- (present on admission)  Assessment & Plan  Continue the patient's outpatient psychiatric medication regimen            Therefore, she is discharged in fair and stable condition to skilled nursing facility.    The patient met 2-midnight criteria for an inpatient stay at the time of discharge.      FOLLOW UP ITEMS POST DISCHARGE  none    DISCHARGE DIAGNOSES  Principal Problem:    Chronic respiratory failure with hypoxia, on home oxygen therapy ([facility]) POA: Yes  Active Problems:    Weakness of both lower extremities POA: Yes    Stridor POA: Yes    H/O prior ablation treatment POA: Yes      Overview: Sinus node modification    Morbidly obese (HCC) POA: Yes    TONYA (obstructive sleep apnea) POA: Yes    Hypokalemia POA: Yes    Lower abdominal pain POA: Yes    Debility POA: Yes    Anxiety POA: Yes    GERD (gastroesophageal reflux disease) (Chronic) POA: Yes    Optic neuritis POA: Yes  Resolved Problems:    * No resolved  hospital problems. *      FOLLOW UP  Future Appointments   Date Time Provider Department Center   10/4/2019  8:15 AM Ignacia Herrera M.D. PULM None   10/18/2019  3:00 PM La Lutz M.D. [facility] [address]   10/31/2019  8:20 AM Shahriar Moncada M.D. PSCR None   11/1/2019  3:00 PM Crissy Serrato M.D. [facility] None     [facility]  [address] [address]  [phone]        Torres Brody M.D.  [address]  [facility] [address]  [phone]    In 2 weeks        MEDICATIONS ON DISCHARGE     Medication List      START taking these medications      Instructions   acetaminophen 325 MG Tabs  Commonly known as:  TYLENOL   Take 2 Tabs by mouth every 6 hours as needed (Mild Pain; (Pain scale 1-3); Temp greater than 100.5 F).  Dose:  650 mg     tramadol 50 MG Tabs  Commonly known as:  ULTRAM   Take 1 Tab by mouth every 6 hours as needed for up to 2 days.  Dose:  50 mg        CONTINUE taking these medications      Instructions   albuterol 108 (90 Base) MCG/ACT Aers inhalation aerosol   Inhale 2 Puffs by mouth every 6 hours as needed for Shortness of Breath.  Dose:  2 Puff     aspirin EC 81 MG Tbec  Commonly known as:  ECOTRIN   Take 1 Tab by mouth every day.  Dose:  81 mg     budesonide-formoterol 160-4.5 MCG/ACT Aero  Commonly known as:  SYMBICORT   Inhale 2 Puffs by mouth 2 Times a Day.  Dose:  2 Puff     busPIRone 10 MG Tabs tablet  Commonly known as:  BUSPAR   Doctor's comments:  Dose increase, okay for an early refill  Take 1 Tab by mouth 2 times a day.  Dose:  10 mg     CELLCEPT 500 MG tablet  Generic drug:  mycophenolate   Take 1,000 mg by mouth 2 times a day.  Dose:  1,000 mg     CORLANOR 7.5 MG Tabs  Generic drug:  Ivabradine HCl   Take 7.5 mg by mouth 2 Times a Day.  Dose:  7.5 mg     fluoxetine 40 MG capsule  Commonly known as:  PROZAC   Doctor's comments:  Dose increase, okay for an early refill  Take 1 Cap by mouth every day.  Dose:  40 mg     Folic Acid 0.8 MG Caps   Take 0.8 mg  by mouth every day.  Dose:  0.8 mg     furosemide 80 MG Tabs  Commonly known as:  LASIX   Take 80 mg by mouth every day.  Dose:  80 mg     gabapentin 300 MG Caps  Commonly known as:  NEURONTIN   Take 300 mg by mouth 4 times a day.  Dose:  300 mg     ipratropium-albuterol 0.5-2.5 (3) MG/3ML nebulizer solution  Commonly known as:  DUONEB   3 mL by Nebulization route every 6 hours as needed for Shortness of Breath.  Dose:  3 mL     lactulose 20 GM/30ML Soln   Take 15 mL by mouth 2 times a day as needed. Indications: Chronic Constipation  Dose:  15 mL     levothyroxine 75 MCG Tabs  Commonly known as:  SYNTHROID   Take 1 Tab by mouth Every morning on an empty stomach.  Dose:  75 mcg     LYRICA 300 MG capsule  Generic drug:  pregabalin   Take 300 mg by mouth 2 times a day.  Dose:  300 mg     Melatonin 5 MG Tabs   Take 5 mg by mouth every morning.  Dose:  5 mg     methocarbamol 750 MG Tabs  Commonly known as:  ROBAXIN   Take 750 mg by mouth 3 times a day.  Dose:  750 mg     montelukast 10 MG Tabs  Commonly known as:  SINGULAIR   Take 1 Tab by mouth every day.  Dose:  10 mg     NEXPLANON 68 MG Impl implant  Generic drug:  etonogestrel   Inject 1 Each as instructed Once.  Dose:  1 Each     ondansetron 4 MG Tabs tablet  Commonly known as:  ZOFRAN   Take 1 Tab by mouth every four hours as needed for Nausea/Vomiting.  Dose:  4 mg     predniSONE 10 MG Tabs  Commonly known as:  DELTASONE   Take 10 mg by mouth every morning.  Dose:  10 mg     sodium bicarbonate 325 MG Tabs   Take 650 mg by mouth 2 Times a Day.  Dose:  650 mg     tiotropium 18 MCG Caps  Commonly known as:  SPIRIVA   Inhale 1 Cap by mouth every day.  Dose:  18 mcg     traZODone 100 MG Tabs  Commonly known as:  DESYREL   Take 100 mg by mouth every morning.  Dose:  100 mg     TRULICITY 0.75 MG/0.5ML Sopn  Generic drug:  Dulaglutide   Inject 1.5 mg as instructed every Friday.  Dose:  1.5 mg     TYLENOL PM EXTRA STRENGTH  MG Tabs  Generic drug:   "diphenhydrAMINE-APAP (sleep)   Take 2 Tabs by mouth every morning.  Dose:  2 Tab     VOLTAREN 1 % Gel  Generic drug:  Diclofenac Sodium   Apply 1 Application to skin as directed 4 times a day as needed (on wrists, back, ankles, and feet).  Dose:  1 Application     ZANTAC 300 MG tablet  Generic drug:  ranitidine   Take 300 mg by mouth every morning.  Dose:  300 mg     ziprasidone 80 MG Caps  Commonly known as:  GEODON   Take 1 Cap by mouth 2 Times a Day.  Dose:  80 mg        STOP taking these medications    cephALEXin 500 MG Caps  Commonly known as:  KEFLEX            Allergies  Allergies   Allergen Reactions   • Cefdinir Shortness of Breath and Itching     Tolerated 1/18/17  Tolerates ceftriaxone    • Depakote [Divalproex Sodium] Unspecified     Muscle spasms/muscle pain and weakness     • Doxycycline Anaphylaxis and Vomiting     RXN=unknown   • Amitriptyline Unspecified     Headaches     • Aripiprazole [Abilify] Unspecified     Headaches/muscle twitching     • Ciprofloxacin Rash     Pt states \"I get a rash\".     • Clindamycin Nausea     Even with food     • Ees [Erythromycin] Vomiting and Nausea   • Flagyl [Metronidazole Hcl] Unspecified     \"eye problems\"     • Flomax [Tamsulosin Hydrochloride] Swelling   • Metformin Unspecified     Increased lactic acid      • Tape Rash     Tears skin off  coban with Tegaderm tape ok intermittently  RXN=ongoing   • Vancomycin Itching     Pt becomes flushed in face and chest.   RXN=7/10/16   • Wound Dressing Adhesive Hives     By pt report   • Cephalexin [Keflex] Rash     Pt states she gets a rash with this medication  Tolerates ceftriaxone   • Divalproex Sodium [Valproic Acid] Rash     .   • Levofloxacin Unspecified     Leg muscle cramps   • Metronidazole Rash     .   • Tamsulosin Hcl        DIET  Orders Placed This Encounter   Procedures   • Diet Order Diabetic     Standing Status:   Standing     Number of Occurrences:   1     Order Specific Question:   Diet:     Answer:   " Diabetic [3]       ACTIVITY  As tolerated.  Weight bearing as tolerated    LINES, DRAINS, AND WOUNDS  This is an automated list. Peripheral IVs will be removed prior to discharge.       Wound 08/13/19 Breast Four skin sore/ulcers on left breast. (Active)   Site Assessment Clean;Dry;Intact 10/1/2019  8:45 AM   Lucretia-wound Assessment Dry;Pink 10/1/2019  8:45 AM   Margins Attached edges 9/30/2019  8:00 AM   Closure Open to air 9/30/2019  8:00 AM   Drainage Amount None 9/30/2019  8:00 AM   Cleansing Not Applicable 9/30/2019  8:00 AM   Dressing Options Open to Air 10/1/2019  8:45 AM       Wound 08/13/19 Abdomen ulcer (Active)   Site Assessment Clean;Dry;Red 10/1/2019  8:45 AM   Lucretia-wound Assessment Dry;Intact 10/1/2019  8:45 AM   Margins Unattached edges 10/1/2019  8:45 AM   Closure Open to air 9/29/2019  7:45 AM   Drainage Amount None 10/1/2019  8:45 AM   Drainage Description Sanguineous 9/27/2019  8:00 AM   Cleansing Normal Saline Irrigation 10/1/2019  8:45 AM   Dressing Options Nonadhesive Foam 10/1/2019  8:45 AM   Dressing Cleansing/Solutions Normal Saline 10/1/2019  8:45 AM   Dressing Changed Changed 10/1/2019  8:45 AM   Dressing Status Clean;Dry;Intact 10/1/2019  8:45 AM                  MENTAL STATUS ON TRANSFER  Level of Consciousness: Alert  Orientation : Oriented x 4  Speech: Speech Clear    CONSULTATIONS  pulm    PROCEDURES  None    DX-NECK FOR SOFT TISSUE   Final Result    Examination limited by patient body habitus.   No prevertebral/retropharyngeal edema is identified.         DX-CHEST-PORTABLE (1 VIEW)   Final Result      No acute cardiopulmonary process is seen.          PE:  Gen: AAOx3, NAD  Eyes: PELLA  Neck: no JVD, no lymphadenopathy  Cardia: RRR, no mrg  Lungs: CTAB, no rales, rhonci or wheezing  Abd: NABS, soft, non extended, no mass  EXT: No C/C/E, peripheral pulse 2+ b/l  Neuro: CN II-XII intact, non focal, reflex 2+ symmetrical  Skin: Intact, no lesion, warm  Psych:  Appropriate.      LABORATORY  Lab Results   Component Value Date    SODIUM 138 10/01/2019    POTASSIUM 3.0 (L) 10/01/2019    CHLORIDE 99 10/01/2019    CO2 26 10/01/2019    GLUCOSE 99 10/01/2019    BUN 22 10/01/2019    CREATININE 0.89 10/01/2019    CREATININE 0.75 (L) 07/20/2010    GLOMRATE >59 07/20/2010        Lab Results   Component Value Date    WBC 7.2 09/29/2019    WBC 6.1 07/20/2010    HEMOGLOBIN 13.3 09/29/2019    HEMATOCRIT 40.3 09/29/2019    PLATELETCT 161 (L) 09/29/2019        Total time of the discharge process exceeds 38 minutes.

## 2019-10-01 NOTE — CARE PLAN
Pt had multiple bowel movements this a.m .  No decrease in bowel motility noted.  Pt has episodes of SOB and requests her inhaler, or breathing txt from [facility].

## 2019-10-01 NOTE — PROGRESS NOTES
Called [facility] and gave report to Brook on the patient arriving around 1400.

## 2019-10-01 NOTE — DISCHARGE PLANNING
Agency/Facility Name: [facility]  Spoke To: Trini  Outcome: Nilsa from [facility] will see patient today, 10/1 to determine skilled need.    [facility]: [facility]  Outcome: Left a voice message regarding patients referral.  Requested a call back.

## 2019-10-01 NOTE — DISCHARGE PLANNING
Agency/Facility Name: [facility]  Spoke To: Trini  Outcome: Patient accepted.      Received Transport Form @ 110  Spoke to Jose @ [facility]    Transport is scheduled for 10/01 @1400 going to [facility].    @1111  Notified Trini at [facility] that transport is set up for 1400 with [facility].

## 2019-10-01 NOTE — DISCHARGE PLANNING
Anticipated Discharge Disposition: Skilled     Action: Patient has been accepted at [place], patient/nurse and Dr informed  Faxed transport communication to [facility] for a 2:00 transport time to [facility]    Barriers to Discharge: n/a    Plan: n/a

## 2019-10-01 NOTE — PROGRESS NOTES
Went over discharge paper work with patient. Went over all medication and paper RX for tramadol given. Control substance consent signed. IV removed. Patient collected belonging. Report called to Brook HASSAN at [place].

## 2019-10-01 NOTE — CARE PLAN
Problem: Safety  Goal: Will remain free from falls  Outcome: PROGRESSING AS EXPECTED  Intervention: Assess risk factors for falls  Flowsheets (Taken 10/1/2019 1217)  Pt Calls for Assistance: Yes  History of fall: 0  Mobility Status Assessment: 2-2 Healthcare Providers Required for Assistance with Ambulation & Transfer  Note:   Fall precautions in place. Call light within reach. Educate patient on hourly rounding and toiletry needs.      Problem: Discharge Barriers/Planning  Goal: Patient's continuum of care needs will be met  Outcome: PROGRESSING AS EXPECTED

## 2019-10-01 NOTE — DISCHARGE INSTRUCTIONS
Discharge Instructions    Discharged to [place]  by medical transportation with escort. Discharged via ambulance, hospital escort: Yes.  Special equipment needed: Walker    Be sure to schedule a follow-up appointment with your primary care doctor or any specialists as instructed.     Discharge Plan:   Diet Plan: Discussed  Activity Level: Discussed  Confirmed Symptoms Management: Discussed  Influenza Vaccine Indication: Not indicated: Previously immunized this influenza season and > 8 years of age    I understand that a diet low in cholesterol, fat, and sodium is recommended for good health. Unless I have been given specific instructions below for another diet, I accept this instruction as my diet prescription.   Other diet: regular diabetic     Special Instructions: None    · Is patient discharged on Warfarin / Coumadin?   No     Depression / Suicide Risk    As [facility] are [facility], it is important to learn how to keep safe from harming yourself.    Recognize the warning signs:  · Abrupt changes in personality, positive or negative- including increase in energy   · Giving away possessions  · Change in eating patterns- significant weight changes-  positive or negative  · Change in sleeping patterns- unable to sleep or sleeping all the time   · Unwillingness or inability to communicate  · Depression  · Unusual sadness, discouragement and loneliness  · Talk of wanting to die  · Neglect of personal appearance   · Rebelliousness- reckless behavior  · Withdrawal from people/activities they love  · Confusion- inability to concentrate     If you or a loved one observes any of these behaviors or has concerns about self-harm, here's what you can do:  · Talk about it- your feelings and reasons for harming yourself  · Remove any means that you might use to hurt yourself (examples: pills, rope, extension cords, firearm)  · Get professional help from the community (Mental Health, Substance Abuse,  psychological counseling)  · Do not be alone:Call your Safe Contact- someone whom you trust who will be there for you.  · Call your local CRISIS HOTLINE 530-2164 or 883-982-8278  · Call your local Children's Mobile Crisis Response Team [place] (982) 704-7172 or www.Widdle  · Call the toll free National Suicide Prevention Hotlines   · National Suicide Prevention Lifeline 248-358-MCED (6843)  · [employer] Hope Line Network 800-SUICIDE (121-0509)      Cerebral Hypoxia, Adult  Cerebral hypoxia occurs when your brain does not get enough oxygen. Cerebral hypoxia is a medical emergency, and it requires breathing assistance with oxygen to prevent permanent brain damage. Without enough oxygen, brain cells can start to die within five minutes.  The effects of cerebral hypoxia can range from mild to very severe and can be short-term or long-term.  What are the causes?  There are many possible causes of cerebral hypoxia. Some common causes include:  · Choking.  · Drowning.  · Strangulation.  · Suffocation.  · Inhaling smoke.  · Carbon monoxide poisoning.  · Head or neck trauma.  · Irregular heartbeats or cardiac arrest.  · Severe asthma attack.  · Complications from general anesthetic.  · Any disease or condition that prevents movement of the muscles that you need in order to breathe.  · Drug overdose.  · Being at high altitudes.  · Blood clot in the lung (pulmonary embolism, or PE).  What are the signs or symptoms?  Symptoms of cerebral hypoxia depend on the severity and the length of time that the brain has been without oxygen. Symptoms from a short period of cerebral hypoxia may include:  · Confusion.  · Lightheadedness.  · Tremors.  · Not being able to pay attention.  · Clumsiness.  · Headaches.  · Short term memory loss.  · Poor judgment.  Symptoms from a longer period of cerebral hypoxia may include:  · Pale or blue skin or a blue color to the lips and fingernails (cyanosis).  · Seizure.  · A state of deep  unconsciousness (coma).  · Confusion.  · Numbness or weakness on one side of your body.  · Slurred speech.  Severe complications of cerebral hypoxia may include:  · Recurrent seizures.  · Limb or extremity spasticity.  · Difficulty with eating or breathing that requires a feeding tube or breathing support.  · Memory loss.  · Tremors (myoclonus).  · Changes in personality.  · Weak limbs.  · Death.  How is this diagnosed?  This condition is diagnosed based on your medical history, symptoms, and a physical exam. You may have tests to confirm the diagnosis and determine how severe your condition is. These may include:  · Blood tests to check the oxygen level in your blood.  · Chest X-ray.  · Imaging studies of the heart and brain, including a CT scan or MRI.  · A test to measure the electrical activity of your heart (electrocardiogram, or EKG).  · A test to measure the electrical activity of your brain (electroencephalogram, or EEG).  How is this treated?  Emergency treatment of cerebral hypoxia starts with taking measures to restore oxygen to your brain and to keep your body functioning (life support). You may:  · Have a breathing tube put in to keep your airway open (intubation).  · Get oxygen.  · Have a machine to help you breathe (mechanical ventilation).  · Get fluids or blood through an IV tube.  · Take medicines to control blood pressure, heart rate, and seizures.  Your treatment may also include steps to prevent brain swelling (cerebral edema).  Long-term treatment of cerebral hypoxia depends on the amount of brain damage. Treatment may include:  · Physical therapy.  · Mental health therapy.  · Nutritional therapy.  Follow these instructions at home:  What you need to do at home will depend on the severity of the cerebral hypoxia. Follow the instructions that your health care provider gives to you. In general:  · Keep all follow-up visits as told by your health care provider. This is important. This includes any  rehabilitation that your health care provider may suggest, such as:  ¨ Physical therapy.  ¨ Mental health therapy.  ¨ Nutritional therapy.  · Learn as much as you can about your condition and work closely with your health care providers.  · Take over-the-counter and prescription medicines only as told by your health care provider.  · Return to your normal activities as told by your health care providers. Ask your health care providers what activities are safe for you.  Contact a health care provider if:  · You have any new symptoms.  Get help right away if:  · You have any sudden change in symptoms.  · Your have cyanosis.  · Your have a seizure.  · You pass out.  · You have chest pain or difficulty breathing.  This information is not intended to replace advice given to you by your health care provider. Make sure you discuss any questions you have with your health care provider.  Document Released: 12/08/2003 Document Revised: 07/07/2017 Document Reviewed: 04/28/2017  [employer] Interactive Patient Education © 2017 [employer]

## 2019-10-01 NOTE — DISCHARGE PLANNING
Agency/Facility Name: [facility]  Spoke To: Voice message from [place]  Outcome: Patients referral declined.  Patient has Medicare/Medicaid and SNF is not Medicaid certified.

## 2019-10-01 NOTE — PROGRESS NOTES
Pt received in bed AAOx4. Able to make all needs known. Continent/Incontinent of bladder and bowel. Positive bowel sounds in all quadrants. Abd soft, and round. Pt had BM x2 this early a.m. C/o generalized pain. Pt administered tylenol as ordered with decrease in pain level.  Pt lung sounds diminished, has cough.  Pt. Showed nurse tan sputum with small blood clot.  No pain or discomfort noted at this time. Will continue to monitor and follow plan of care.

## 2019-10-02 LAB — GLUCOSE BLD-MCNC: 115 MG/DL (ref 65–99)

## 2019-10-03 LAB — VIT B1 BLD-MCNC: 98 NMOL/L (ref 70–180)

## 2019-10-04 ENCOUNTER — OFFICE VISIT (OUTPATIENT)
Dept: PULMONOLOGY | Facility: HOSPICE | Age: 29
End: 2019-10-04
Payer: MEDICARE

## 2019-10-04 VITALS
BODY MASS INDEX: 44.48 KG/M2 | OXYGEN SATURATION: 98 % | WEIGHT: 267 LBS | HEIGHT: 65 IN | HEART RATE: 110 BPM | SYSTOLIC BLOOD PRESSURE: 134 MMHG | DIASTOLIC BLOOD PRESSURE: 78 MMHG | RESPIRATION RATE: 16 BRPM

## 2019-10-04 DIAGNOSIS — G47.33 OSA (OBSTRUCTIVE SLEEP APNEA): ICD-10-CM

## 2019-10-04 DIAGNOSIS — J38.3 VOCAL CORD DYSFUNCTION: ICD-10-CM

## 2019-10-04 DIAGNOSIS — R79.89 LACTATE BLOOD INCREASED: ICD-10-CM

## 2019-10-04 DIAGNOSIS — H46.9 OPTIC NEURITIS: ICD-10-CM

## 2019-10-04 DIAGNOSIS — E66.01 CLASS 3 SEVERE OBESITY WITH BODY MASS INDEX (BMI) OF 45.0 TO 49.9 IN ADULT, UNSPECIFIED OBESITY TYPE, UNSPECIFIED WHETHER SERIOUS COMORBIDITY PRESENT (HCC): ICD-10-CM

## 2019-10-04 DIAGNOSIS — J96.11 CHRONIC RESPIRATORY FAILURE WITH HYPOXIA (HCC): ICD-10-CM

## 2019-10-04 DIAGNOSIS — J45.50 SEVERE PERSISTENT ASTHMA, UNSPECIFIED WHETHER COMPLICATED: ICD-10-CM

## 2019-10-04 PROCEDURE — 99214 OFFICE O/P EST MOD 30 MIN: CPT | Performed by: INTERNAL MEDICINE

## 2019-10-04 ASSESSMENT — ENCOUNTER SYMPTOMS
EYE REDNESS: 0
WEAKNESS: 0
HEADACHES: 0
DIZZINESS: 0
PND: 0
DIAPHORESIS: 0
BACK PAIN: 0
HEMOPTYSIS: 0
SPEECH CHANGE: 0
STRIDOR: 1
SHORTNESS OF BREATH: 1
WEIGHT LOSS: 0
MYALGIAS: 0
PALPITATIONS: 0
WHEEZING: 1
DOUBLE VISION: 0
ABDOMINAL PAIN: 0
TREMORS: 0
VOMITING: 0
FOCAL WEAKNESS: 0
NECK PAIN: 0
FALLS: 0
PHOTOPHOBIA: 0
ORTHOPNEA: 0
HEARTBURN: 1
NAUSEA: 0
EYE PAIN: 0
SINUS PAIN: 0
BLURRED VISION: 0
SORE THROAT: 0
CLAUDICATION: 0
FEVER: 0
DIARRHEA: 0
SPUTUM PRODUCTION: 0
CHILLS: 0
CONSTIPATION: 0
EYE DISCHARGE: 0
DEPRESSION: 0
COUGH: 0

## 2019-10-04 NOTE — PROGRESS NOTES
Chief Complaint   Patient presents with   • Follow-Up     TONYA         HPI: This patient is a 28 y.o. female whom is followed in our clinic for severe persistent asthma last seen by me on 9/5/19.  The patient has a complicated past medical history including optic neuritis for which she is on immunosuppressive therapy with CellCept and chronic prednisone.  She also suffers from hypothyroid, chronic kidney disorder, unspecified weakness with suspected metabolic (? Mitochondrial) disorder and asthma.  She was recently diagnosed with mild obstructive sleep apnea after referral to sleep medicine following her first visit with us in July.  AHI was roughly 13.  She is pending follow-up to start therapy for this.  With regards to her asthma, this is a childhood diagnosis which was relatively well controlled however over the past several months she has had multiple visits to the emergency department for worsening asthma symptoms.  She had been hospitalized from August 29 to September 1 at which point she was noted to have mildly elevated lactic acid from 4-6 and ABG at that time showed metabolic acidosis with respiratory compensation with a pH of 7.44, PCO2 of 19 and PaO2 of 160 on supplemental oxygen.  She is on chronic prednisone for her optic neuritis.  Pulmonary function test on September 5 showed mild airflow restriction, no bronchodilator response, low normal lung volumes and low normal DLCO.  She did not desaturate on 6-minute walk test but does require supplemental oxygen with activity and has for some time.  Her regimen includes Symbicort 160, Spiriva Respimat, Singulair, as needed short acting bronchodilators in the chronic prednisone for which is actually prescribed for her optic neuritis.  Since our last visit she has been hospitalized with brief stay in the ICU for presumed acute asthma exacerbation with suspected vocal cord dysfunction.  She was discharged to rehab where she is currently and has a list of  "medications with her today but states they have been changed including Symbicort to Breo, not receiving Zantac which was reportedly discontinued.  We did check a vitamin D and IgE after our first appointment and she was noted to have low vitamin D and apparently started on supplementation although patient is not aware of this.  IgE was within normal limits and eosinophils have been normal.  She tells me she has had episodes of acute asthma exacerbation on 2 occasions since being at rehab.  She recently saw ENT who did feel she had evidence of vocal cord dysfunction and recommended swallow therapy given concern for aspiration.  The patient is reporting significant gastroesophageal reflux symptoms today.  No fevers, chills, night sweats, weight loss.    Past Medical History:   Diagnosis Date   • Abdominal pain    • Anginal syndrome     random chest pain especially with tachycardia   • Apnea, sleep    • Arrhythmia     \"sinus tachycardia\", cariologist, Dr. Kumar; ablation 2/2016   • Arthritis     osteo   • ASTHMA     when around smoke   • Atrial fibrillation (HCC)    • Back pain    • Borderline personality disorder (HCC)    • Breath shortness     with tachycardia   • Bronchitis    • Cardiac arrhythmia    • Chickenpox    • Chronic UTI 9/18/2010   • Cough    • Daytime sleepiness    • Depression    • Diabetes (HCC)    • Diarrhea    • Disorder of thyroid    • Fall    • Fatigue    • Frequent headaches    • Gasping for breath    • Gynecological disorder     PCOS   • Headache(784.0)    • Heart burn    • History of falling    • Hypertension    • Indigestion    • Migraine    • 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HCC)    • 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HCC)    • Nausea    • Obesity    • Pain 08-15-12    back, 7/10   • Painful joint    • PCOS (polycystic ovarian syndrome)    • Pneumonia 2012   • Psychosis (HCC)    • Renal disorder     \"kidney disease, stage 1\" nephrologist, Dr. Vallejo   • Ringing in ears    • Scoliosis    • Shortness of breath " "   • Sinus tachycardia 10/31/2013   • Sleep apnea     CPAP \"pulmonary doctor took me off mid year 2016\"   • Snoring    • Tonsillitis    • Tuberculosis     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   • Urinary bladder disorder     Suprapubic cath   • Urinary incontinence    • Weakness    • Wears glasses        Social History     Socioeconomic History   • Marital status: Single     Spouse name: Not on file   • Number of children: Not on file   • Years of education: Not on file   • Highest education level: Not on file   Occupational History   • Not on file   Social Needs   • Financial resource strain: Not on file   • Food insecurity:     Worry: Not on file     Inability: Not on file   • Transportation needs:     Medical: Not on file     Non-medical: Not on file   Tobacco Use   • Smoking status: Never Smoker   • Smokeless tobacco: Never Used   Substance and Sexual Activity   • Alcohol use: No     Alcohol/week: 0.0 oz   • Drug use: Yes     Frequency: 7.0 times per week     Types: Marijuana, Oral     Comment: Medicinal edible's   • Sexual activity: Not Currently     Birth control/protection: Pill   Lifestyle   • Physical activity:     Days per week: Not on file     Minutes per session: Not on file   • Stress: Not on file   Relationships   • Social connections:     Talks on phone: Not on file     Gets together: Not on file     Attends Church service: Not on file     Active member of club or organization: Not on file     Attends meetings of clubs or organizations: Not on file     Relationship status: Not on file   • Intimate partner violence:     Fear of current or ex partner: Not on file     Emotionally abused: Not on file     Physically abused: Not on file     Forced sexual activity: Not on file   Other Topics Concern   • Not on file   Social History Narrative    ** Merged History Encounter **            Family History   Problem Relation Age of Onset   • Hypertension Mother    • Sleep Apnea Mother    • Heart Disease " Mother    • Other Mother         hypothryod   • Hypertension Maternal Uncle    • Heart Disease Maternal Grandmother    • Hypertension Maternal Grandmother    • No Known Problems Sister    • Other Sister         Narcolepsy;fibromyalsia;bone;nerve   • Genitourinary () Problems Sister         endometriosis       Current Outpatient Medications on File Prior to Visit   Medication Sig Dispense Refill   • acetaminophen (TYLENOL) 325 MG Tab Take 2 Tabs by mouth every 6 hours as needed (Mild Pain; (Pain scale 1-3); Temp greater than 100.5 F). 30 Tab 0   • lactulose 20 GM/30ML Solution Take 15 mL by mouth 2 times a day as needed. Indications: Chronic Constipation     • traZODone (DESYREL) 100 MG Tab Take 100 mg by mouth every morning.     • predniSONE (DELTASONE) 10 MG Tab Take 10 mg by mouth every morning.     • ranitidine (ZANTAC) 300 MG tablet Take 300 mg by mouth every morning.     • diphenhydrAMINE-APAP, sleep, (TYLENOL PM EXTRA STRENGTH)  MG Tab Take 2 Tabs by mouth every morning.     • Diclofenac Sodium (VOLTAREN) 1 % Gel Apply 1 Application to skin as directed 4 times a day as needed (on wrists, back, ankles, and feet).     • busPIRone (BUSPAR) 10 MG Tab tablet Take 1 Tab by mouth 2 times a day. 60 Tab 1   • fluoxetine (PROZAC) 40 MG capsule Take 1 Cap by mouth every day. 30 Cap 1   • ziprasidone (GEODON) 80 MG Cap Take 1 Cap by mouth 2 Times a Day. 60 Cap 1   • montelukast (SINGULAIR) 10 MG Tab Take 1 Tab by mouth every day. 90 Tab 3   • tiotropium (SPIRIVA) 18 MCG Cap Inhale 1 Cap by mouth every day. 90 Cap 3   • Folic Acid 0.8 MG Cap Take 0.8 mg by mouth every day.     • Ivabradine HCl (CORLANOR) 7.5 MG Tab Take 7.5 mg by mouth 2 Times a Day.     • ipratropium-albuterol (DUONEB) 0.5-2.5 (3) MG/3ML nebulizer solution 3 mL by Nebulization route every 6 hours as needed for Shortness of Breath. 60 Bullet 1   • etonogestrel (NEXPLANON) 68 MG Implant implant Inject 1 Each as instructed Once.     • mycophenolate  (CELLCEPT) 500 MG tablet Take 1,000 mg by mouth 2 times a day.     • levothyroxine (SYNTHROID) 75 MCG Tab Take 1 Tab by mouth Every morning on an empty stomach. 90 Tab 1   • gabapentin (NEURONTIN) 300 MG Cap Take 300 mg by mouth 4 times a day.     • Dulaglutide (TRULICITY) 0.75 MG/0.5ML Solution Pen-injector Inject 1.5 mg as instructed every Friday.     • ondansetron (ZOFRAN) 4 MG Tab tablet Take 1 Tab by mouth every four hours as needed for Nausea/Vomiting. 20 Tab 3   • sodium bicarbonate 325 MG Tab Take 650 mg by mouth 2 Times a Day.     • albuterol 108 (90 Base) MCG/ACT Aero Soln inhalation aerosol Inhale 2 Puffs by mouth every 6 hours as needed for Shortness of Breath.     • Melatonin 5 MG Tab Take 5 mg by mouth every morning.     • furosemide (LASIX) 80 MG Tab Take 80 mg by mouth every day.     • methocarbamol (ROBAXIN) 750 MG Tab Take 750 mg by mouth 3 times a day.     • LYRICA 300 MG capsule Take 300 mg by mouth 2 times a day.     • aspirin EC (ECOTRIN) 81 MG Tablet Delayed Response Take 1 Tab by mouth every day. 30 Tab 6   • budesonide-formoterol (SYMBICORT) 160-4.5 MCG/ACT Aerosol Inhale 2 Puffs by mouth 2 Times a Day. (Patient not taking: Reported on 10/4/2019) 1 Inhaler 11     No current facility-administered medications on file prior to visit.        Cefdinir; Depakote [divalproex sodium]; Doxycycline; Amitriptyline; Aripiprazole [abilify]; Ciprofloxacin; Clindamycin; Ees [erythromycin]; Flagyl [metronidazole hcl]; Flomax [tamsulosin hydrochloride]; Metformin; Tape; Vancomycin; Wound dressing adhesive; Cephalexin [keflex]; Divalproex sodium [valproic acid]; Levofloxacin; Metronidazole; and Tamsulosin hcl      ROS:   Review of Systems   Constitutional: Negative for chills, diaphoresis, fever, malaise/fatigue and weight loss.   HENT: Negative for congestion, ear discharge, ear pain, hearing loss, nosebleeds, sinus pain, sore throat and tinnitus.    Eyes: Negative for blurred vision, double vision,  "photophobia, pain, discharge and redness.   Respiratory: Positive for shortness of breath, wheezing and stridor. Negative for cough, hemoptysis and sputum production.    Cardiovascular: Negative for chest pain, palpitations, orthopnea, claudication, leg swelling and PND.   Gastrointestinal: Positive for heartburn. Negative for abdominal pain, constipation, diarrhea, nausea and vomiting.   Genitourinary: Negative for dysuria and urgency.   Musculoskeletal: Negative for back pain, falls, joint pain, myalgias and neck pain.   Skin: Negative for itching and rash.   Neurological: Negative for dizziness, tremors, speech change, focal weakness, weakness and headaches.   Endo/Heme/Allergies: Negative for environmental allergies.   Psychiatric/Behavioral: Negative for depression.       /78 (BP Location: Left arm, Patient Position: Sitting, BP Cuff Size: Large adult)   Pulse (!) 110   Resp 16   Ht 1.651 m (5' 5\")   Wt 121.1 kg (267 lb)   SpO2 98%   Physical Exam   Constitutional: She is oriented to person, place, and time. She appears well-developed and well-nourished. No distress.   Morbid obesity   HENT:   Head: Normocephalic and atraumatic.   Left Ear: External ear normal.   Mouth/Throat: Oropharynx is clear and moist. No oropharyngeal exudate.   Eyes: Pupils are equal, round, and reactive to light. Conjunctivae and EOM are normal. No scleral icterus.   Neck: Normal range of motion. Neck supple. No JVD present. No tracheal deviation present.   Cardiovascular: Normal rate, regular rhythm and normal heart sounds. Exam reveals no gallop and no friction rub.   No murmur heard.  Pulmonary/Chest: Breath sounds normal. No accessory muscle usage. No respiratory distress. She has no wheezes. She has no rales.   Abdominal: Soft. She exhibits no distension. There is no tenderness.   obese   Musculoskeletal: Normal range of motion. She exhibits edema. She exhibits no tenderness or deformity.   Trace b/l LE edema   "   Lymphadenopathy:     She has no cervical adenopathy.   Neurological: She is alert and oriented to person, place, and time. No cranial nerve deficit. Gait normal.   Skin: Skin is warm and dry. No rash noted. No cyanosis. Nails show no clubbing.   Psychiatric: She has a normal mood and affect.       PFTs as reviewed by me personally: as per HPI    Assessment:  1. Vocal cord dysfunction  REFERRAL TO SPEECH THERAPY Reason for Therapy: Eval/Treat/Report   2. Severe persistent asthma, unspecified whether complicated     3. Class 3 severe obesity with body mass index (BMI) of 45.0 to 49.9 in adult, unspecified obesity type, unspecified whether serious comorbidity present (HCC)     4. TONYA (obstructive sleep apnea)     5. Optic neuritis     6. Lactate blood increased     7. Chronic respiratory failure with hypoxia (HCC)         Plan:  1.  Patient recently saw ENT with some evidence of vocal cord dysfunction or inappropriate spasming and some concern for aspiration.  He recommended speech therapy evaluation which she is not formally getting at rehab.  I will refer to speech therapy at [facility].  I would also recommend cognitive behavioral therapy.  2.  I am not sure that her recurrent episodes are always due to asthma attack versus a combination of asthma and vocal cord dysfunction.  She has many coexisting conditions such as obesity, vitamin D deficiency and gastroesophageal reflux disease all which can worsen her asthma control.  I may am recommending ongoing vitamin D supplementation 800 to 1000 units/day given recent level of 20.  I am recommending we continue her regimen of Symbicort 160, not Breo and Spiriva Respimat, Singulair and as needed short acting bronchodilators via nebulizer or MDI.  I would recommend resuming antacid therapy and lifestyle modifications to prevent reflux such as elevating her head of bed, remaining upright after meals and avoiding foods that worsen reflux.  3.  This is likely contributing to  her poor asthma control as I suspect she has some element of hypoventilation.  She would benefit from increased activity even in the absence of weight loss.  I would recommend formal nutritional evaluation rather than guessing at calorie level which it sounds as though they have her to low at rehab.  4.  This is mild and she has not yet seen sleep for follow-up to start therapy but does have supplemental oxygen at night which I recommend she continue.  5.  Followed in ophthalmology for this.  I am leaving prednisone recommendations up to them.  6.  Patient has had mildly elevated lactates in the setting of asthma attack which as per my last note can happen in the severe asthma attack but she has also been evaluated at [facility] with suspicion for mitochondrial disorder but was unable to undergo formal genetic testing.  I do think she would benefit from an evaluation particularly if there is a treatment that could be offered her.  In the meantime we will continue to attempt to improve management of her comorbid conditions.  Certainly lactic acidosis is not going to help her respiratory condition as the compensation would be respiratory in nature and I suspect at times she cannot meet the demands.  7.  Patient with low normal DLCO therefore I suspect her symptoms are in part related to obesity hypoventilation.  As per above she would benefit from increased activity even in the absence of weight loss.  Unfortunately her pulmonary function test do not qualify her for pulmonary rehab however she is receiving rehab at rehab facility currently.  Continue supplemental oxygen with activity.  Okay to wean if SaO2 greater than 90.    Return in about 6 weeks (around 11/15/2019) for asthma.

## 2019-10-16 ENCOUNTER — HOME HEALTH ADMISSION (OUTPATIENT)
Dept: HOME HEALTH SERVICES | Facility: HOME HEALTHCARE | Age: 29
End: 2019-10-16
Payer: MEDICARE

## 2019-10-18 ENCOUNTER — APPOINTMENT (OUTPATIENT)
Dept: BEHAVIORAL HEALTH | Facility: CLINIC | Age: 29
End: 2019-10-18
Payer: MEDICARE

## 2019-10-22 ENCOUNTER — OFFICE VISIT (OUTPATIENT)
Dept: MEDICAL GROUP | Facility: MEDICAL CENTER | Age: 29
End: 2019-10-22
Payer: MEDICARE

## 2019-10-22 VITALS
WEIGHT: 282 LBS | TEMPERATURE: 97.2 F | BODY MASS INDEX: 46.98 KG/M2 | HEIGHT: 65 IN | OXYGEN SATURATION: 94 % | DIASTOLIC BLOOD PRESSURE: 64 MMHG | HEART RATE: 96 BPM | SYSTOLIC BLOOD PRESSURE: 112 MMHG

## 2019-10-22 DIAGNOSIS — E87.6 HYPOKALEMIA: ICD-10-CM

## 2019-10-22 DIAGNOSIS — E11.65 UNCONTROLLED TYPE 2 DIABETES MELLITUS WITH HYPERGLYCEMIA (HCC): ICD-10-CM

## 2019-10-22 PROBLEM — E66.01 MORBID OBESITY WITH BMI OF 45.0-49.9, ADULT (HCC): Status: RESOLVED | Noted: 2017-10-24 | Resolved: 2019-10-22

## 2019-10-22 PROBLEM — R06.1 STRIDOR: Status: RESOLVED | Noted: 2019-09-25 | Resolved: 2019-10-22

## 2019-10-22 PROCEDURE — 99214 OFFICE O/P EST MOD 30 MIN: CPT | Performed by: INTERNAL MEDICINE

## 2019-10-22 RX ORDER — POTASSIUM CHLORIDE 20 MEQ/1
20 TABLET, EXTENDED RELEASE ORAL 2 TIMES DAILY
Qty: 90 TAB | Refills: 3 | Status: SHIPPED | OUTPATIENT
Start: 2019-10-22 | End: 2019-11-06

## 2019-10-22 NOTE — PROGRESS NOTES
CC: Hospital follow-up asthma, hypokalemia, diabetes.                                                                                                                                      HPI:   Kristin presents today with the following.    1. Moderate intermittent asthma without complication  Presents after recent hospitalization for repeated asthmatic exacerbation she reports her breathing is still somewhat strained but doing well staying on top of her albuterol and maintain on steroid.  She is also on Levaquin for an inner ear infection.  Denying any current nausea or vomiting reports she is having intermittent problems breathing but had just recently got a nebulizer treatment so is breathing fine.    2. Hypokalemia  Repeatedly hypokalemic she is on replacement.  She is needing repeat labs did not have a magnesium checked.    3. Uncontrolled type 2 diabetes mellitus with hyperglycemia ([facility])  Reports blood sugars have started climbing up at home slightly she is not checking her sugars because of her fingers hurting.  Last A1c was 6.      Patient Active Problem List    Diagnosis Date Noted   • Chronic respiratory failure with hypoxia, on home oxygen therapy ([facility]) 08/08/2018     Priority: High   • Breast wound 11/06/2017     Priority: High   • Weakness of both lower extremities 06/22/2016     Priority: High   • Sinus tachycardia 10/31/2013     Priority: High   • Hypokalemia 09/29/2019     Priority: Medium   • TONYA (obstructive sleep apnea) 01/09/2018     Priority: Medium   • Morbidly obese ([facility]) 03/07/2016     Priority: Medium   • H/O prior ablation treatment 02/10/2016     Priority: Medium   • Optic neuritis 05/27/2019     Priority: Low   • Depression 10/28/2016     Priority: Low   • Schizophrenia ([facility]) 10/27/2016     Priority: Low   • GERD (gastroesophageal reflux disease) 03/07/2016     Priority: Low   • PCOS (polycystic ovarian syndrome) 11/23/2015     Priority: Low   • Progressive focal motor weakness  06/28/2015     Priority: Low   • Fatty liver disease, nonalcoholic 01/19/2015     Priority: Low   • Anxiety 12/16/2014     Priority: Low   • Knee pain, right 02/13/2014     Priority: Low   • Neurogenic bladder 04/02/2011     Priority: Low   • Borderline personality disorder in adult ([facility]) 09/18/2010     Priority: Low   • Debility 09/30/2019   • Uncontrolled type 2 diabetes mellitus with hyperglycemia ([facility]) 08/29/2019   • Moderate intermittent asthma without complication 06/20/2019   • Inappropriate sinus tachycardia 04/10/2019   • Palpitations 10/01/2018   • Functional diarrhea 01/05/2018   • Hashimoto's encephalopathy 05/17/2017   • Hypovitaminosis D 11/29/2016   • Bowel and bladder incontinence 10/27/2016   • Galactorrhea 07/22/2016   • Scoliosis 03/07/2016   • Peripheral neuropathy (CMS-HCC) 03/06/2016   • Lower abdominal pain 04/02/2011       Current Outpatient Medications   Medication Sig Dispense Refill   • potassium chloride SA (KDUR) 20 MEQ Tab CR Take 1 Tab by mouth 2 times a day. 90 Tab 3   • acetaminophen (TYLENOL) 325 MG Tab Take 2 Tabs by mouth every 6 hours as needed (Mild Pain; (Pain scale 1-3); Temp greater than 100.5 F). 30 Tab 0   • lactulose 20 GM/30ML Solution Take 15 mL by mouth 2 times a day as needed. Indications: Chronic Constipation     • traZODone (DESYREL) 100 MG Tab Take 100 mg by mouth every morning.     • predniSONE (DELTASONE) 10 MG Tab Take 10 mg by mouth every morning.     • ranitidine (ZANTAC) 300 MG tablet Take 300 mg by mouth every morning.     • diphenhydrAMINE-APAP, sleep, (TYLENOL PM EXTRA STRENGTH)  MG Tab Take 2 Tabs by mouth every morning.     • Diclofenac Sodium (VOLTAREN) 1 % Gel Apply 1 Application to skin as directed 4 times a day as needed (on wrists, back, ankles, and feet).     • busPIRone (BUSPAR) 10 MG Tab tablet Take 1 Tab by mouth 2 times a day. 60 Tab 1   • fluoxetine (PROZAC) 40 MG capsule Take 1 Cap by mouth every day. 30 Cap 1   • ziprasidone (GEODON) 80  MG Cap Take 1 Cap by mouth 2 Times a Day. 60 Cap 1   • montelukast (SINGULAIR) 10 MG Tab Take 1 Tab by mouth every day. 90 Tab 3   • tiotropium (SPIRIVA) 18 MCG Cap Inhale 1 Cap by mouth every day. 90 Cap 3   • Folic Acid 0.8 MG Cap Take 0.8 mg by mouth every day.     • Ivabradine HCl (CORLANOR) 7.5 MG Tab Take 7.5 mg by mouth 2 Times a Day.     • ipratropium-albuterol (DUONEB) 0.5-2.5 (3) MG/3ML nebulizer solution 3 mL by Nebulization route every 6 hours as needed for Shortness of Breath. 60 Bullet 1   • budesonide-formoterol (SYMBICORT) 160-4.5 MCG/ACT Aerosol Inhale 2 Puffs by mouth 2 Times a Day. (Patient not taking: Reported on 10/4/2019) 1 Inhaler 11   • etonogestrel (NEXPLANON) 68 MG Implant implant Inject 1 Each as instructed Once.     • mycophenolate (CELLCEPT) 500 MG tablet Take 1,000 mg by mouth 2 times a day.     • levothyroxine (SYNTHROID) 75 MCG Tab Take 1 Tab by mouth Every morning on an empty stomach. 90 Tab 1   • gabapentin (NEURONTIN) 300 MG Cap Take 300 mg by mouth 4 times a day.     • Dulaglutide (TRULICITY) 0.75 MG/0.5ML Solution Pen-injector Inject 1.5 mg as instructed every Friday.     • ondansetron (ZOFRAN) 4 MG Tab tablet Take 1 Tab by mouth every four hours as needed for Nausea/Vomiting. 20 Tab 3   • sodium bicarbonate 325 MG Tab Take 650 mg by mouth 2 Times a Day.     • albuterol 108 (90 Base) MCG/ACT Aero Soln inhalation aerosol Inhale 2 Puffs by mouth every 6 hours as needed for Shortness of Breath.     • Melatonin 5 MG Tab Take 5 mg by mouth every morning.     • furosemide (LASIX) 80 MG Tab Take 80 mg by mouth every day.     • methocarbamol (ROBAXIN) 750 MG Tab Take 750 mg by mouth 3 times a day.     • LYRICA 300 MG capsule Take 300 mg by mouth 2 times a day.     • aspirin EC (ECOTRIN) 81 MG Tablet Delayed Response Take 1 Tab by mouth every day. 30 Tab 6     No current facility-administered medications for this visit.          Allergies as of 10/22/2019 - Reviewed 10/22/2019   Allergen  "Reaction Noted   • Cefdinir Shortness of Breath and Itching 03/01/2016   • Depakote [divalproex sodium] Unspecified 06/14/2010   • Doxycycline Anaphylaxis and Vomiting 08/15/2012   • Amitriptyline Unspecified 10/31/2013   • Aripiprazole [abilify] Unspecified 01/17/2013   • Ciprofloxacin Rash 12/17/2009   • Clindamycin Nausea 02/02/2011   • Ees [erythromycin] Vomiting and Nausea 08/28/2010   • Flagyl [metronidazole hcl] Unspecified 03/31/2011   • Flomax [tamsulosin hydrochloride] Swelling 09/24/2009   • Metformin Unspecified 07/23/2013   • Tape Rash 08/15/2012   • Vancomycin Itching 07/10/2016   • Wound dressing adhesive Hives 01/12/2018   • Cephalexin [keflex] Rash 01/01/2017   • Divalproex sodium [valproic acid] Rash 03/30/2017   • Levofloxacin Unspecified 10/27/2016   • Metronidazole Rash 03/30/2017   • Tamsulosin hcl  09/19/2010        ROS: Denies Chest pain, SOB, LE edema.    /64 (BP Location: Right arm, Patient Position: Sitting) Comment (BP Location): R. Forearm  Pulse 96   Temp 36.2 °C (97.2 °F)   Ht 1.651 m (5' 5\")   Wt (!) 127.9 kg (282 lb)   SpO2 94%   BMI 46.93 kg/m²     Physical Exam:  Gen:         Alert and oriented, No apparent distress.  Neck:        No Lymphadenopathy or Bruits.  Lungs:     Clear to auscultation bilaterally  CV:          Regular rate and rhythm. No murmurs, rubs or gallops.               Ext:          No clubbing, cyanosis, edema.      Assessment and Plan.   29 y.o. female with the following issues.    1. Moderate intermittent asthma without complication  Currently doing well but still somewhat labile she is trying to get into pulmonology continue current inhalers and steroid.    2. Hypokalemia  Checking blood work refilled medication also adding magnesium.  - potassium chloride SA (KDUR) 20 MEQ Tab CR; Take 1 Tab by mouth 2 times a day.  Dispense: 90 Tab; Refill: 3  - Basic Metabolic Panel; Future  - MAGNESIUM; Future    3. Uncontrolled type 2 diabetes mellitus with " hyperglycemia (HCC)  In checking sugars repeat A1c in approximately 1 month.

## 2019-10-25 ENCOUNTER — HOSPITAL ENCOUNTER (OUTPATIENT)
Facility: MEDICAL CENTER | Age: 29
End: 2019-10-25
Attending: INTERNAL MEDICINE
Payer: MEDICARE

## 2019-10-25 LAB
ANION GAP SERPL CALC-SCNC: 14 MMOL/L (ref 0–11.9)
BUN SERPL-MCNC: 11 MG/DL (ref 8–22)
CALCIUM SERPL-MCNC: 9.5 MG/DL (ref 8.5–10.5)
CHLORIDE SERPL-SCNC: 107 MMOL/L (ref 96–112)
CO2 SERPL-SCNC: 18 MMOL/L (ref 20–33)
CREAT SERPL-MCNC: 0.92 MG/DL (ref 0.5–1.4)
GLUCOSE SERPL-MCNC: 174 MG/DL (ref 65–99)
MAGNESIUM SERPL-MCNC: 1.7 MG/DL (ref 1.5–2.5)
POTASSIUM SERPL-SCNC: 3.8 MMOL/L (ref 3.6–5.5)
SODIUM SERPL-SCNC: 139 MMOL/L (ref 135–145)

## 2019-10-25 PROCEDURE — 80048 BASIC METABOLIC PNL TOTAL CA: CPT

## 2019-10-25 PROCEDURE — 83735 ASSAY OF MAGNESIUM: CPT

## 2019-10-28 ENCOUNTER — TELEPHONE (OUTPATIENT)
Dept: MEDICAL GROUP | Facility: MEDICAL CENTER | Age: 29
End: 2019-10-28

## 2019-10-28 ENCOUNTER — HOSPITAL ENCOUNTER (EMERGENCY)
Facility: MEDICAL CENTER | Age: 29
End: 2019-10-28
Attending: EMERGENCY MEDICINE
Payer: MEDICARE

## 2019-10-28 ENCOUNTER — APPOINTMENT (OUTPATIENT)
Dept: RADIOLOGY | Facility: MEDICAL CENTER | Age: 29
End: 2019-10-28
Attending: EMERGENCY MEDICINE
Payer: MEDICARE

## 2019-10-28 ENCOUNTER — APPOINTMENT (OUTPATIENT)
Dept: RADIOLOGY | Facility: MEDICAL CENTER | Age: 29
End: 2019-10-28
Payer: MEDICARE

## 2019-10-28 VITALS
RESPIRATION RATE: 16 BRPM | TEMPERATURE: 98.3 F | HEART RATE: 76 BPM | BODY MASS INDEX: 46.65 KG/M2 | DIASTOLIC BLOOD PRESSURE: 80 MMHG | HEIGHT: 65 IN | SYSTOLIC BLOOD PRESSURE: 145 MMHG | WEIGHT: 280 LBS | OXYGEN SATURATION: 96 %

## 2019-10-28 DIAGNOSIS — E87.20 LACTIC ACIDOSIS: ICD-10-CM

## 2019-10-28 DIAGNOSIS — H60.63 CHRONIC NON-INFECTIVE OTITIS EXTERNA OF BOTH EARS, UNSPECIFIED TYPE: ICD-10-CM

## 2019-10-28 DIAGNOSIS — L03.115 CELLULITIS OF RIGHT LOWER EXTREMITY: ICD-10-CM

## 2019-10-28 DIAGNOSIS — S70.02XA CONTUSION OF LEFT HIP, INITIAL ENCOUNTER: ICD-10-CM
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
## 2019-10-28 DIAGNOSIS — R19.7 DIARRHEA OF PRESUMED INFECTIOUS ORIGIN: ICD-10-CM

## 2019-10-28 LAB
ALBUMIN SERPL BCP-MCNC: 4.8 G/DL (ref 3.2–4.9)
ALBUMIN/GLOB SERPL: 2.3 G/DL
ALP SERPL-CCNC: 40 U/L (ref 30–99)
ALT SERPL-CCNC: 35 U/L (ref 2–50)
ANION GAP SERPL CALC-SCNC: 13 MMOL/L (ref 0–11.9)
APPEARANCE UR: CLEAR
AST SERPL-CCNC: 15 U/L (ref 12–45)
BASOPHILS # BLD AUTO: 0.6 % (ref 0–1.8)
BASOPHILS # BLD: 0.04 K/UL (ref 0–0.12)
BILIRUB SERPL-MCNC: 0.4 MG/DL (ref 0.1–1.5)
BILIRUB UR QL STRIP.AUTO: NEGATIVE
BUN SERPL-MCNC: 11 MG/DL (ref 8–22)
CALCIUM SERPL-MCNC: 9.1 MG/DL (ref 8.5–10.5)
CHLORIDE SERPL-SCNC: 107 MMOL/L (ref 96–112)
CO2 SERPL-SCNC: 20 MMOL/L (ref 20–33)
COLOR UR: YELLOW
CREAT SERPL-MCNC: 0.91 MG/DL (ref 0.5–1.4)
EOSINOPHIL # BLD AUTO: 0.03 K/UL (ref 0–0.51)
EOSINOPHIL NFR BLD: 0.4 % (ref 0–6.9)
ERYTHROCYTE [DISTWIDTH] IN BLOOD BY AUTOMATED COUNT: 42.7 FL (ref 35.9–50)
FLUAV RNA SPEC QL NAA+PROBE: NEGATIVE
FLUBV RNA SPEC QL NAA+PROBE: NEGATIVE
GLOBULIN SER CALC-MCNC: 2.1 G/DL (ref 1.9–3.5)
GLUCOSE SERPL-MCNC: 141 MG/DL (ref 65–99)
GLUCOSE UR STRIP.AUTO-MCNC: NEGATIVE MG/DL
HCT VFR BLD AUTO: 43.3 % (ref 37–47)
HGB BLD-MCNC: 14.7 G/DL (ref 12–16)
IMM GRANULOCYTES # BLD AUTO: 0.03 K/UL (ref 0–0.11)
IMM GRANULOCYTES NFR BLD AUTO: 0.4 % (ref 0–0.9)
KETONES UR STRIP.AUTO-MCNC: ABNORMAL MG/DL
LACTATE BLD-SCNC: 2.3 MMOL/L (ref 0.5–2)
LACTATE BLD-SCNC: 2.8 MMOL/L (ref 0.5–2)
LEUKOCYTE ESTERASE UR QL STRIP.AUTO: NEGATIVE
LYMPHOCYTES # BLD AUTO: 1.79 K/UL (ref 1–4.8)
LYMPHOCYTES NFR BLD: 25.9 % (ref 22–41)
MCH RBC QN AUTO: 31.3 PG (ref 27–33)
MCHC RBC AUTO-ENTMCNC: 33.9 G/DL (ref 33.6–35)
MCV RBC AUTO: 92.1 FL (ref 81.4–97.8)
MICRO URNS: ABNORMAL
MONOCYTES # BLD AUTO: 0.53 K/UL (ref 0–0.85)
MONOCYTES NFR BLD AUTO: 7.7 % (ref 0–13.4)
NEUTROPHILS # BLD AUTO: 4.49 K/UL (ref 2–7.15)
NEUTROPHILS NFR BLD: 65 % (ref 44–72)
NITRITE UR QL STRIP.AUTO: NEGATIVE
NRBC # BLD AUTO: 0 K/UL
NRBC BLD-RTO: 0 /100 WBC
PH UR STRIP.AUTO: 5 [PH] (ref 5–8)
PLATELET # BLD AUTO: 206 K/UL (ref 164–446)
PMV BLD AUTO: 11.8 FL (ref 9–12.9)
POTASSIUM SERPL-SCNC: 3.8 MMOL/L (ref 3.6–5.5)
PROT SERPL-MCNC: 6.9 G/DL (ref 6–8.2)
PROT UR QL STRIP: NEGATIVE MG/DL
RBC # BLD AUTO: 4.7 M/UL (ref 4.2–5.4)
RBC UR QL AUTO: NEGATIVE
SODIUM SERPL-SCNC: 140 MMOL/L (ref 135–145)
SP GR UR STRIP.AUTO: 1.04
UROBILINOGEN UR STRIP.AUTO-MCNC: 1 MG/DL
WBC # BLD AUTO: 6.9 K/UL (ref 4.8–10.8)

## 2019-10-28 PROCEDURE — A9270 NON-COVERED ITEM OR SERVICE: HCPCS | Performed by: STUDENT IN AN ORGANIZED HEALTH CARE EDUCATION/TRAINING PROGRAM

## 2019-10-28 PROCEDURE — 36415 COLL VENOUS BLD VENIPUNCTURE: CPT

## 2019-10-28 PROCEDURE — 87086 URINE CULTURE/COLONY COUNT: CPT

## 2019-10-28 PROCEDURE — 700102 HCHG RX REV CODE 250 W/ 637 OVERRIDE(OP): Performed by: STUDENT IN AN ORGANIZED HEALTH CARE EDUCATION/TRAINING PROGRAM

## 2019-10-28 PROCEDURE — 99285 EMERGENCY DEPT VISIT HI MDM: CPT

## 2019-10-28 PROCEDURE — 85025 COMPLETE CBC W/AUTO DIFF WBC: CPT

## 2019-10-28 PROCEDURE — 87502 INFLUENZA DNA AMP PROBE: CPT

## 2019-10-28 PROCEDURE — 71045 X-RAY EXAM CHEST 1 VIEW: CPT

## 2019-10-28 PROCEDURE — 83605 ASSAY OF LACTIC ACID: CPT

## 2019-10-28 PROCEDURE — 87040 BLOOD CULTURE FOR BACTERIA: CPT

## 2019-10-28 PROCEDURE — 81003 URINALYSIS AUTO W/O SCOPE: CPT

## 2019-10-28 PROCEDURE — 80053 COMPREHEN METABOLIC PANEL: CPT

## 2019-10-28 RX ORDER — AMOXICILLIN AND CLAVULANATE POTASSIUM 875; 125 MG/1; MG/1
1 TABLET, FILM COATED ORAL 2 TIMES DAILY
Qty: 20 TAB | Refills: 0 | Status: SHIPPED | OUTPATIENT
Start: 2019-10-28 | End: 2019-11-06

## 2019-10-28 RX ORDER — HYDROCODONE BITARTRATE AND ACETAMINOPHEN 5; 325 MG/1; MG/1
1 TABLET ORAL ONCE
Status: COMPLETED | OUTPATIENT
Start: 2019-10-28 | End: 2019-10-28

## 2019-10-28 RX ADMIN — HYDROCODONE BITARTRATE AND ACETAMINOPHEN 1 TABLET: 5; 325 TABLET ORAL at 22:05

## 2019-10-28 NOTE — TELEPHONE ENCOUNTER
1. Caller Name: Kristin                      Call Back Number: [phone] (home)       2. Message: patient states she is getting worse and would like to an order for the lab to get lactic acid drawn.    3. Patient approves office to leave a detailed voicemail/MyChart message: yes

## 2019-10-29 NOTE — ED NOTES
Patient to triage via wheelchair.    IV access placed and blood rainbow and cultures x 2 drawn per protocol and sent to lab.    Specimen container provided and instructed on clean catch urine sample - verbalized understanding.    Triage process explained to patient, apologized for wait time, and [facility] [place] [facility].

## 2019-10-29 NOTE — ED TRIAGE NOTES
"Chief Complaint   Patient presents with   • Foot Swelling     +redness, heel right   • Leg Pain     \"burning sensation r>l   • Fever     \"\" degrees     Pt wheeled to triage with above complaints.   Sepsis score=3, protocol ordered  "

## 2019-10-29 NOTE — ED PROVIDER NOTES
"ED Provider Note    Scribed for Awilda Marte M.D. by Katarzyna Zayas. 10/28/2019, 9:00 PM.    Primary care provider: Torres Brody M.D.  Means of arrival: Walk in  History obtained from: Patient   History limited by: None    CHIEF COMPLAINT  Chief Complaint   Patient presents with   • Foot Swelling     +redness, heel right   • Leg Pain     \"burning sensation r>l   • Fever     \"\" degrees       HPI  Kristin Balderrama is a 29 y.o. female who presents to the Emergency Department for evaluation of right heal redness and pain that began a few days ago. [facility] looked at the foot and marked the border of the redness. The redness has slightly spread past the border. Patient said a large amount of skin peeled off of her right heel with bleeding 5 days ago. Patient also reports moderate bilateral leg pain described as burning. She reports some confusion stating her \"head has been muddled\" for the past 5 days . Patient endorses additional symptoms of fever with a max recorded temp of 100 °F, diaphoresis, increased sleeping up to 20 hours a day, generalized swelling, abdominal pain, nausea, vomiting, and dark watery diarrhea. Patient denies any chills. Patient states the lasix did not help her swelling.  Patient was on Levaquin a week ago for an ear infection. She denies any tabacco or alcohol use. She endorses medical marijuana use. Patient took 1000 mg tylenol and 800 mg Advil prior to arrival.     REVIEW OF SYSTEMS  Pertinent positives include heel redness and swelling, bilateral leg pain, confusion, fever, diaphoresis, increased sleeping, generalized swelling, abdominal pain, nausea, vomiting, and diarrhea. Pertinent negatives include no chills. As above, all other systems reviewed and are negative.   See HPI for further details.     PAST MEDICAL HISTORY  Past Medical History:   Diagnosis Date   • Abdominal pain    • Anginal syndrome     random chest pain especially with tachycardia   • Apnea, sleep " "   • Arrhythmia     \"sinus tachycardia\", cariologist, Dr. Kumar; ablation 2/2016   • Arthritis     osteo   • ASTHMA     when around smoke   • Atrial fibrillation (HCC)    • Back pain    • Borderline personality disorder (HCC)    • Breath shortness     with tachycardia   • Bronchitis    • Cardiac arrhythmia    • Chickenpox    • Chronic UTI 9/18/2010   • Cough    • Daytime sleepiness    • Depression    • Diabetes (HCC)    • Diarrhea    • Disorder of thyroid    • Fall    • Fatigue    • Frequent headaches    • Gasping for breath    • Gynecological disorder     PCOS   • Headache(784.0)    • Heart burn    • History of falling    • Hypertension    • Indigestion    • Migraine    • 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HCC)    • 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HCC)    • Nausea    • Obesity    • Pain 08-15-12    back, 7/10   • Painful joint    • PCOS (polycystic ovarian syndrome)    • Pneumonia 2012   • Psychosis (HCC)    • Renal disorder     \"kidney disease, stage 1\" nephrologist, Dr. 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   • Urinary bladder disorder     Suprapubic cath   • Urinary incontinence    • Weakness    • Wears glasses        SURGICAL HISTORY  Past Surgical History:   Procedure Laterality Date   • MUSCLE BIOPSY Right 1/26/2017    Procedure: MUSCLE BIOPSY - THIGH;  Surgeon: Isidro Vigil M.D.;  Location: [facility];  Service:    • GASTROSCOPY WITH BALLOON DILATATION N/A 1/4/2017    Procedure: GASTROSCOPY WITH DILATATION;  Surgeon: Torres Vargas M.D.;  Location: [facility];  Service:    • BOWEL STIMULATOR INSERTION  7/13/2016    Procedure: BOWEL STIMULATOR INSERTION FOR PERMANENT INTERSTIM SACRAL IMPLANT;  Surgeon: Joe Noyola M.D.;  Location: [facility];  Service:    • " RECOVERY  1/27/2016    Procedure: CATH LAB EP STUDY WITH SINUS NODE MODIFICATION ABHINAV;  Surgeon: Lina Surgery;  Location: SURGERY PRE-POST PROC UNIT [facility] [place];  Service:    • OTHER CARDIAC SURGERY  1/2016    cardiac ablation   • NEURO DEST FACET L/S W/IG SNGL  4/21/2015    Performed by Reza Tabor at SUR[facility]ERY [facility]   • LUMBAR FUSION ANTERIOR  8/21/2012    Performed by SUSIE SAWANT at SURGERY [facility] ORS   • ALYSSA BY LAPAROSCOPY  8/29/2010    Performed by SHAYY JOHNS at SURGERY [facility] ORS   • LAMINOTOMY     • OTHER ABDOMINAL SURGERY     • TONSILLECTOMY      tonsillectomy       SOCIAL HISTORY  Social History     Tobacco Use   • Smoking status: Never Smoker   • Smokeless tobacco: Never Used   Substance Use Topics   • Alcohol use: No     Alcohol/week: 0.0 oz   • Drug use: Yes     Frequency: 7.0 times per week     Types: Marijuana, Oral     Comment: Medicinal edible's      Social History     Substance and Sexual Activity   Drug Use Yes   • Frequency: 7.0 times per week   • Types: Marijuana, Oral    Comment: Medicinal edible's       FAMILY HISTORY  Family History   Problem Relation Age of Onset   • Hypertension Mother    • Sleep Apnea Mother    • Heart Disease Mother    • Other Mother         hypothryod   • Hypertension Maternal Uncle    • Heart Disease Maternal Grandmother    • Hypertension Maternal Grandmother    • No Known Problems Sister    • Other Sister         Narcolepsy;fibromyalsia;bone;nerve   • Genitourinary () Problems Sister         endometriosis       CURRENT MEDICATIONS  Home Medications     Reviewed by Kaitlin Ibarra R.N. (Registered Nurse) on 10/28/19 at 1830  Med List Status: Unable to Obtain   Medication Last Dose Status   acetaminophen (TYLENOL) 325 MG Tab  Active   albuterol 108 (90 Base) MCG/ACT Aero Soln inhalation aerosol  Active   aspirin EC (ECOTRIN) 81 MG Tablet Delayed Response  Active   budesonide-formoterol (SYMBICORT) 160-4.5 MCG/ACT Aerosol  Active  "  busPIRone (BUSPAR) 10 MG Tab tablet  Active   Diclofenac Sodium (VOLTAREN) 1 % Gel  Active   diphenhydrAMINE-APAP, sleep, (TYLENOL PM EXTRA STRENGTH)  MG Tab  Active   Dulaglutide (TRULICITY) 0.75 MG/0.5ML Solution Pen-injector  Active   etonogestrel (NEXPLANON) 68 MG Implant implant  Active   fluoxetine (PROZAC) 40 MG capsule  Active   Folic Acid 0.8 MG Cap  Active   furosemide (LASIX) 80 MG Tab  Active   gabapentin (NEURONTIN) 300 MG Cap  Active   ipratropium-albuterol (DUONEB) 0.5-2.5 (3) MG/3ML nebulizer solution  Active   Ivabradine HCl (CORLANOR) 7.5 MG Tab  Active   lactulose 20 GM/30ML Solution  Active   levothyroxine (SYNTHROID) 75 MCG Tab  Active   LYRICA 300 MG capsule  Active   Melatonin 5 MG Tab  Active   methocarbamol (ROBAXIN) 750 MG Tab  Active   montelukast (SINGULAIR) 10 MG Tab  Active   mycophenolate (CELLCEPT) 500 MG tablet  Active   ondansetron (ZOFRAN) 4 MG Tab tablet  Active   potassium chloride SA (KDUR) 20 MEQ Tab CR  Active   predniSONE (DELTASONE) 10 MG Tab  Active   ranitidine (ZANTAC) 300 MG tablet  Active   sodium bicarbonate 325 MG Tab  Active   tiotropium (SPIRIVA) 18 MCG Cap  Active   traZODone (DESYREL) 100 MG Tab  Active   ziprasidone (GEODON) 80 MG Cap  Active                ALLERGIES  Allergies   Allergen Reactions   • Cefdinir Shortness of Breath and Itching     Tolerated 1/18/17  Tolerates ceftriaxone    • Depakote [Divalproex Sodium] Unspecified     Muscle spasms/muscle pain and weakness     • Doxycycline Anaphylaxis and Vomiting     RXN=unknown   • Amitriptyline Unspecified     Headaches     • Aripiprazole [Abilify] Unspecified     Headaches/muscle twitching     • Clindamycin Nausea     Even with food     • Ees [Erythromycin] Vomiting and Nausea   • Flagyl [Metronidazole Hcl] Unspecified     \"eye problems\"     • Flomax [Tamsulosin Hydrochloride] Swelling   • Levaquin Unspecified     Severe muscle cramps in legs  RXN=unknown   • Metformin Unspecified     Increased " "lactic acid      • Tape Rash     Tears skin off  coban with Tegaderm tape ok intermittently  RXN=ongoing   • Vancomycin Itching     Pt becomes flushed in face and chest.   RXN=7/10/16   • Wound Dressing Adhesive Hives     By pt report   • Cephalexin [Keflex] Rash     Pt states she gets a rash with this medication  Tolerates ceftriaxone   • Divalproex Sodium [Valproic Acid] Rash     .   • Erythromycin Rash     .   • Levofloxacin Unspecified     Leg muscle cramps   • Metronidazole Rash     .   • Tamsulosin Hcl        PHYSICAL EXAM  VITAL SIGNS: /85   Pulse 91   Temp 37.1 °C (98.7 °F) (Oral)   Resp 20   Ht 1.651 m (5' 5\")   Wt (!) 127 kg (280 lb)   SpO2 95%   BMI 46.59 kg/m²   Vitals reviewed.    Consitutional: Obese. Well-developed, morbidly obese, chronically ill-appearing. Negative for: distress.  HENT: Normocephalic, right external ear normal, left external ear normal, oropharynx clear and moist. Bilateral ear cannals erythemitous. Bilateral eustation tubes. No impaction.   Eyes: Conjunctivae normal, extraocular movements normal. Negative for: discharge in right and left eye, icterus.  Neck: Range of motion normal, supple. Negative for cervical adenopathy.  Cardiovascular: Normal rate, regular rhythm, heart sounds normal, intact distal pulses. Negative for: murmur, rub, gallop.  Pulmonary/Chest Wall: Effort normal, breath sounds normal. Negative for: respiratory distress, wheezes, rales, rhonchi.   Abdominal: Soft, bowel sounds normal. Diffuse mild tenderness to palpation. Negative for: distention, tenderness, rebound, guarding.  Musculoskeletal: Normal range of motion. Negative for edema. Left hip tender to palpation with no bruising.  Neurological: Alert and oriented x3. No focal deficits.  Skin: Warm, dry. Negative for rash. Three 1cm abrasion one with purulent discharge on abdomen. Right heel abrasions that are healing, slightly warmer and more erythemitus than surrounding skin.   Psych: " Mood/affect normal, behavior normal, judgment questionable      DIAGNOSTIC STUDIES / PROCEDURES    LABS  Results for orders placed or performed during the hospital encounter of 10/28/19   Lactic acid (lactate)   Result Value Ref Range    Lactic Acid 2.8 (H) 0.5 - 2.0 mmol/L   Lactic acid (lactate): Repeat if initial lactic acid result is greater than 2   Result Value Ref Range    Lactic Acid 2.3 (H) 0.5 - 2.0 mmol/L   CBC WITH DIFFERENTIAL   Result Value Ref Range    WBC 6.9 4.8 - 10.8 K/uL    RBC 4.70 4.20 - 5.40 M/uL    Hemoglobin 14.7 12.0 - 16.0 g/dL    Hematocrit 43.3 37.0 - 47.0 %    MCV 92.1 81.4 - 97.8 fL    MCH 31.3 27.0 - 33.0 pg    MCHC 33.9 33.6 - 35.0 g/dL    RDW 42.7 35.9 - 50.0 fL    Platelet Count 206 164 - 446 K/uL    MPV 11.8 9.0 - 12.9 fL    Neutrophils-Polys 65.00 44.00 - 72.00 %    Lymphocytes 25.90 22.00 - 41.00 %    Monocytes 7.70 0.00 - 13.40 %    Eosinophils 0.40 0.00 - 6.90 %    Basophils 0.60 0.00 - 1.80 %    Immature Granulocytes 0.40 0.00 - 0.90 %    Nucleated RBC 0.00 /100 WBC    Neutrophils (Absolute) 4.49 2.00 - 7.15 K/uL    Lymphs (Absolute) 1.79 1.00 - 4.80 K/uL    Monos (Absolute) 0.53 0.00 - 0.85 K/uL    Eos (Absolute) 0.03 0.00 - 0.51 K/uL    Baso (Absolute) 0.04 0.00 - 0.12 K/uL    Immature Granulocytes (abs) 0.03 0.00 - 0.11 K/uL    NRBC (Absolute) 0.00 K/uL   COMP METABOLIC PANEL   Result Value Ref Range    Sodium 140 135 - 145 mmol/L    Potassium 3.8 3.6 - 5.5 mmol/L    Chloride 107 96 - 112 mmol/L    Co2 20 20 - 33 mmol/L    Anion Gap 13.0 (H) 0.0 - 11.9    Glucose 141 (H) 65 - 99 mg/dL    Bun 11 8 - 22 mg/dL    Creatinine 0.91 0.50 - 1.40 mg/dL    Calcium 9.1 8.5 - 10.5 mg/dL    AST(SGOT) 15 12 - 45 U/L    ALT(SGPT) 35 2 - 50 U/L    Alkaline Phosphatase 40 30 - 99 U/L    Total Bilirubin 0.4 0.1 - 1.5 mg/dL    Albumin 4.8 3.2 - 4.9 g/dL    Total Protein 6.9 6.0 - 8.2 g/dL    Globulin 2.1 1.9 - 3.5 g/dL    A-G Ratio 2.3 g/dL   URINALYSIS   Result Value Ref Range    Color  Yellow     Character Clear     Specific Gravity 1.036 <1.035    Ph 5.0 5.0 - 8.0    Glucose Negative Negative mg/dL    Ketones Trace (A) Negative mg/dL    Protein Negative Negative mg/dL    Bilirubin Negative Negative    Urobilinogen, Urine 1.0 Negative    Nitrite Negative Negative    Leukocyte Esterase Negative Negative    Occult Blood Negative Negative    Micro Urine Req see below    URINE CULTURE(NEW)   Result Value Ref Range    Significant Indicator NEG     Source UR     Site -     Culture Result Culture in progress.    BLOOD CULTURE   Result Value Ref Range    Significant Indicator NEG     Source BLD     Site PERIPHERAL     Culture Result       No Growth  Note: Blood cultures are incubated for 5 days and  are monitored continuously.Positive blood cultures  are called to the RN and reported as soon as  they are identified.     BLOOD CULTURE   Result Value Ref Range    Significant Indicator NEG     Source BLD     Site PERIPHERAL     Culture Result       No Growth  Note: Blood cultures are incubated for 5 days and  are monitored continuously.Positive blood cultures  are called to the RN and reported as soon as  they are identified.     Influenza A/B By PCR (Adult - Flu Only)   Result Value Ref Range    Influenza virus A RNA Negative Negative    Influenza virus B, PCR Negative Negative   ESTIMATED GFR   Result Value Ref Range    GFR If African American >60 >60 mL/min/1.73 m 2    GFR If Non African American >60 >60 mL/min/1.73 m 2     *Note: Due to a large number of results and/or encounters for the requested time period, some results have not been displayed. A complete set of results can be found in Results Review.     All labs reviewed by me.    RADIOLOGY  DX-CHEST-PORTABLE (1 VIEW)   Final Result      No acute cardiopulmonary abnormality.        The radiologist's interpretation of all radiological studies have been reviewed by me.    COURSE & MEDICAL DECISION MAKING  Nursing notes, VS, PMSFHx reviewed in  chart.    Obtained and reviewed past medical records where in and intensivist stated that the patient will have a chronic lactic acidosis due to her level of asthma and debility.    9:00 PM Patient seen and examined at bedside. The patient presents with some complaints of skin infections and chronic ear pain and the differential diagnosis includes but is not limited to influenza, urinary tract infection, C. difficile, otitis media/externa. Ordered DX-Chest, Lactic Acid, Estimated GFR, Influenza A/B by PCR, CBC w/ differential, CMP, Urinalysis, Urine Culture, Blood culture, C diff by PCR. Patient will be treated with Hydrocodone-acetaminophen 352 mg for her symptoms.      9:45 PM - Patient was reevaluated at bedside. Discussed lab results with the patient and informed them that everything looked normal.  Spoke to the ER pharmacist to decide on the appropriate antibiotic.  I informed her we will prescribe her vancomycin for possible C. Diff and augmentin. Patient is agreeable to the plan of care.     10:19 PM - Patient was reevaluated at bedside. Discussed antibiotic concerns with patient and some instructions for care of her foot. I also instructed the patient to collect a stool sample and take it to one of the [facility] labs since she was unable to produce one here. Patient verbalizes understanding to the plan for discharge.     The patient will return for new or worsening symptoms and is stable at the time of discharge.    The patient is referred to a primary physician for blood pressure management, diabetic screening, and for all other preventative health concerns.      DISPOSITION:  Patient will be discharged home in stable condition.    FOLLOW UP:  Torres Brody M.D.  [address] [place], [place] [address]  [place] [id number]  [phone]    Schedule an appointment as soon as possible for a visit         OUTPATIENT MEDICATIONS:  Discharge Medication List as of 10/28/2019 10:34 PM      START taking these medications    Details    Vancomycin HCl (VANCOMYCIN 50 MG/ML) 50 mg/mL Solution Take 2.5 mL by mouth every 6 hours for 7 days., Disp-70 mL, R-0, Print Rx Paper      amoxicillin-clavulanate (AUGMENTIN) 875-125 MG Tab Take 1 Tab by mouth 2 times a day for 10 days., Disp-20 Tab, R-0, Normal               FINAL IMPRESSION  1. Cellulitis of right lower extremity Acute   2. Cat scratch Acute   3. Lactic acidosis Chronic   4. Chronic non-infective otitis externa of both ears, unspecified type    5. Diarrhea of presumed infectious origin    6. Contusion of left hip, initial encounter          Katarzyna RODRIGUEZ (Scribsadie), am scribing for, and in the presence of, Awilda Marte M.D..    Electronically signed by: Katarzyna Zayas (Eva), 10/28/2019    Awilda RODRIGUEZ M.D. personally performed the services described in this documentation, as scribed by Katarzyna Zayas in my presence, and it is both accurate and complete.    C    The note accurately reflects work and decisions made by me.  Awilda Marte  10/29/2019  3:59 PM

## 2019-10-29 NOTE — ED NOTES
The patient has been provided with discharge education and information.  The patient was also provided with instructions on follow up care and return precautions.  The patient verbalizes understanding of discharge instructions, follow up care, and return precautions.  All questions have been answered.  One RX written and one e script by REGAN.  Adenike OCAMPO, good color and appropriate at time of discharge.  Patient wheeled chaired out of department. Family driving patient home.

## 2019-10-30 ENCOUNTER — HOSPITAL ENCOUNTER (OUTPATIENT)
Facility: MEDICAL CENTER | Age: 29
End: 2019-10-30
Attending: EMERGENCY MEDICINE
Payer: MEDICARE

## 2019-10-30 LAB
BACTERIA UR CULT: NORMAL
C DIFF DNA SPEC QL NAA+PROBE: NEGATIVE
C DIFF TOX GENS STL QL NAA+PROBE: NEGATIVE
SIGNIFICANT IND 70042: NORMAL
SITE SITE: NORMAL
SOURCE SOURCE: NORMAL

## 2019-10-30 PROCEDURE — 87493 C DIFF AMPLIFIED PROBE: CPT

## 2019-10-31 ENCOUNTER — APPOINTMENT (OUTPATIENT)
Dept: SLEEP MEDICINE | Facility: MEDICAL CENTER | Age: 29
End: 2019-10-31
Payer: MEDICARE

## 2019-11-04 ENCOUNTER — APPOINTMENT (OUTPATIENT)
Dept: BEHAVIORAL HEALTH | Facility: CLINIC | Age: 29
End: 2019-11-04
Payer: MEDICARE

## 2019-11-04 ENCOUNTER — TELEPHONE (OUTPATIENT)
Dept: MEDICAL GROUP | Facility: MEDICAL CENTER | Age: 29
End: 2019-11-04

## 2019-11-04 NOTE — TELEPHONE ENCOUNTER
VOICEMAIL  1. Caller Name: Kristin                      Call Back Number: [phone]    2. Message: Patient has had a headache and worsening diarrhea since starting Vancomycin. She is wondering if she should stop it. Please advise.    3. Patient approves office to leave a detailed voicemail/MyChart message: N\A

## 2019-11-05 ENCOUNTER — OFFICE VISIT (OUTPATIENT)
Dept: MEDICAL GROUP | Facility: MEDICAL CENTER | Age: 29
End: 2019-11-05
Payer: MEDICARE

## 2019-11-05 VITALS
SYSTOLIC BLOOD PRESSURE: 114 MMHG | HEART RATE: 82 BPM | BODY MASS INDEX: 47.15 KG/M2 | WEIGHT: 283 LBS | TEMPERATURE: 99 F | DIASTOLIC BLOOD PRESSURE: 64 MMHG | HEIGHT: 65 IN | OXYGEN SATURATION: 100 %

## 2019-11-05 DIAGNOSIS — L08.9 FOOT INFECTION: ICD-10-CM

## 2019-11-05 DIAGNOSIS — R19.7 DIARRHEA, UNSPECIFIED TYPE: ICD-10-CM

## 2019-11-05 PROCEDURE — 99213 OFFICE O/P EST LOW 20 MIN: CPT | Performed by: INTERNAL MEDICINE

## 2019-11-05 NOTE — PROGRESS NOTES
CC: Follow-up hospitalization diarrhea and foot infection.                                                                                                                                      HPI:   Kristin presents today with the following.    1. Diarrhea, unspecified type  Presented to the hospital diarrhea concerns for possible C. difficile was placed on vancomycin however toxin comes back negative.  She is still having diarrhea she thinks as related to antibiotics.  It is improving slightly.    2. Foot infection  She was placed on double coverage of Augmentin and vancomycin she is having abdominal complaints that she is had with vancomycin in the past would like to stop.  The foot is almost completely resolved there is no further redness or drainage and there is no opening she states is back to baseline.      Patient Active Problem List    Diagnosis Date Noted   • Chronic respiratory failure with hypoxia, on home oxygen therapy ([facility]) 08/08/2018     Priority: High   • Breast wound 11/06/2017     Priority: High   • Weakness of both lower extremities 06/22/2016     Priority: High   • Sinus tachycardia 10/31/2013     Priority: High   • Hypokalemia 09/29/2019     Priority: Medium   • TONYA (obstructive sleep apnea) 01/09/2018     Priority: Medium   • Morbidly obese ([facility]) 03/07/2016     Priority: Medium   • H/O prior ablation treatment 02/10/2016     Priority: Medium   • Optic neuritis 05/27/2019     Priority: Low   • Depression 10/28/2016     Priority: Low   • Schizophrenia ([facility]) 10/27/2016     Priority: Low   • GERD (gastroesophageal reflux disease) 03/07/2016     Priority: Low   • PCOS (polycystic ovarian syndrome) 11/23/2015     Priority: Low   • Progressive focal motor weakness 06/28/2015     Priority: Low   • Fatty liver disease, nonalcoholic 01/19/2015     Priority: Low   • Anxiety 12/16/2014     Priority: Low   • Knee pain, right 02/13/2014     Priority: Low   • Neurogenic bladder 04/02/2011     Priority:  Low   • Borderline personality disorder in adult ([facility]) 09/18/2010     Priority: Low   • Debility 09/30/2019   • Uncontrolled type 2 diabetes mellitus with hyperglycemia ([facility]) 08/29/2019   • Moderate intermittent asthma without complication 06/20/2019   • Inappropriate sinus tachycardia 04/10/2019   • Palpitations 10/01/2018   • Functional diarrhea 01/05/2018   • Hashimoto's encephalopathy 05/17/2017   • Hypovitaminosis D 11/29/2016   • Bowel and bladder incontinence 10/27/2016   • Galactorrhea 07/22/2016   • Scoliosis 03/07/2016   • Peripheral neuropathy (CMS-HCC) 03/06/2016   • Lower abdominal pain 04/02/2011       Current Outpatient Medications   Medication Sig Dispense Refill   • amoxicillin-clavulanate (AUGMENTIN) 875-125 MG Tab Take 1 Tab by mouth 2 times a day for 10 days. 20 Tab 0   • potassium chloride SA (KDUR) 20 MEQ Tab CR Take 1 Tab by mouth 2 times a day. 90 Tab 3   • acetaminophen (TYLENOL) 325 MG Tab Take 2 Tabs by mouth every 6 hours as needed (Mild Pain; (Pain scale 1-3); Temp greater than 100.5 F). 30 Tab 0   • lactulose 20 GM/30ML Solution Take 15 mL by mouth 2 times a day as needed. Indications: Chronic Constipation     • traZODone (DESYREL) 100 MG Tab Take 100 mg by mouth every morning.     • predniSONE (DELTASONE) 10 MG Tab Take 10 mg by mouth every morning.     • ranitidine (ZANTAC) 300 MG tablet Take 300 mg by mouth every morning.     • diphenhydrAMINE-APAP, sleep, (TYLENOL PM EXTRA STRENGTH)  MG Tab Take 2 Tabs by mouth every morning.     • Diclofenac Sodium (VOLTAREN) 1 % Gel Apply 1 Application to skin as directed 4 times a day as needed (on wrists, back, ankles, and feet).     • busPIRone (BUSPAR) 10 MG Tab tablet Take 1 Tab by mouth 2 times a day. 60 Tab 1   • fluoxetine (PROZAC) 40 MG capsule Take 1 Cap by mouth every day. 30 Cap 1   • ziprasidone (GEODON) 80 MG Cap Take 1 Cap by mouth 2 Times a Day. 60 Cap 1   • montelukast (SINGULAIR) 10 MG Tab Take 1 Tab by mouth every day. 90  Tab 3   • tiotropium (SPIRIVA) 18 MCG Cap Inhale 1 Cap by mouth every day. 90 Cap 3   • Folic Acid 0.8 MG Cap Take 0.8 mg by mouth every day.     • Ivabradine HCl (CORLANOR) 7.5 MG Tab Take 7.5 mg by mouth 2 Times a Day.     • ipratropium-albuterol (DUONEB) 0.5-2.5 (3) MG/3ML nebulizer solution 3 mL by Nebulization route every 6 hours as needed for Shortness of Breath. 60 Bullet 1   • etonogestrel (NEXPLANON) 68 MG Implant implant Inject 1 Each as instructed Once.     • mycophenolate (CELLCEPT) 500 MG tablet Take 1,000 mg by mouth 2 times a day.     • levothyroxine (SYNTHROID) 75 MCG Tab Take 1 Tab by mouth Every morning on an empty stomach. 90 Tab 1   • gabapentin (NEURONTIN) 300 MG Cap Take 300 mg by mouth 4 times a day.     • Dulaglutide (TRULICITY) 0.75 MG/0.5ML Solution Pen-injector Inject 1.5 mg as instructed every Friday.     • ondansetron (ZOFRAN) 4 MG Tab tablet Take 1 Tab by mouth every four hours as needed for Nausea/Vomiting. 20 Tab 3   • sodium bicarbonate 325 MG Tab Take 650 mg by mouth 2 Times a Day.     • albuterol 108 (90 Base) MCG/ACT Aero Soln inhalation aerosol Inhale 2 Puffs by mouth every 6 hours as needed for Shortness of Breath.     • Melatonin 5 MG Tab Take 5 mg by mouth every morning.     • furosemide (LASIX) 80 MG Tab Take 80 mg by mouth every day.     • methocarbamol (ROBAXIN) 750 MG Tab Take 750 mg by mouth 3 times a day.     • LYRICA 300 MG capsule Take 300 mg by mouth 2 times a day.     • aspirin EC (ECOTRIN) 81 MG Tablet Delayed Response Take 1 Tab by mouth every day. 30 Tab 6     No current facility-administered medications for this visit.          Allergies as of 11/05/2019 - Reviewed 11/05/2019   Allergen Reaction Noted   • Cefdinir Shortness of Breath and Itching 03/01/2016   • Depakote [divalproex sodium] Unspecified 06/14/2010   • Doxycycline Anaphylaxis and Vomiting 08/15/2012   • Amitriptyline Unspecified 10/31/2013   • Aripiprazole [abilify] Unspecified 01/17/2013   •  "Clindamycin Nausea 02/02/2011   • Ees [erythromycin] Vomiting and Nausea 08/28/2010   • Flagyl [metronidazole hcl] Unspecified 03/31/2011   • Flomax [tamsulosin hydrochloride] Swelling 09/24/2009   • Levaquin Unspecified 10/31/2013   • Metformin Unspecified 07/23/2013   • Tape Rash 08/15/2012   • Vancomycin Itching 07/10/2016   • Wound dressing adhesive Hives 01/12/2018   • Cephalexin [keflex] Rash 01/01/2017   • Divalproex sodium [valproic acid] Rash 03/30/2017   • Erythromycin Rash 03/30/2017   • Levofloxacin Unspecified 10/27/2016   • Metronidazole Rash 03/30/2017   • Tamsulosin hcl  09/19/2010        ROS: Denies Chest pain, SOB, LE edema.    /64 (BP Location: Right arm, Patient Position: Sitting)   Pulse 82   Temp 37.2 °C (99 °F)   Ht 1.651 m (5' 5\")   Wt (!) 128.4 kg (283 lb)   SpO2 100%   BMI 47.09 kg/m²     Physical Exam:  Gen:         Alert and oriented, No apparent distress.  Neck:        No Lymphadenopathy or Bruits.  Lungs:     Clear to auscultation bilaterally  CV:          Regular rate and rhythm. No murmurs, rubs or gallops.               Ext:          No clubbing, cyanosis, edema.      Assessment and Plan.   29 y.o. female with the following issues.    1. Diarrhea, unspecified type  Have given it test for C. difficile if she does not resolve from her diarrhea when antibiotics are stopped.  - Cdiff By PCR Rflx Toxin; Future    2. Foot infection  Discussion about continuing full course of vancomycin she does not want to do so but is willing to continue Augmentin contact office if any spreading infectious symptoms should occur.      "

## 2019-11-06 ENCOUNTER — APPOINTMENT (OUTPATIENT)
Dept: RADIOLOGY | Facility: MEDICAL CENTER | Age: 29
DRG: 565 | End: 2019-11-06
Attending: EMERGENCY MEDICINE
Payer: MEDICARE

## 2019-11-06 ENCOUNTER — HOSPITAL ENCOUNTER (INPATIENT)
Facility: MEDICAL CENTER | Age: 29
LOS: 6 days | DRG: 565 | End: 2019-11-12
Attending: EMERGENCY MEDICINE | Admitting: INTERNAL MEDICINE
Payer: MEDICARE

## 2019-11-06 DIAGNOSIS — M25.561 CHRONIC PAIN OF RIGHT KNEE: ICD-10-CM

## 2019-11-06 DIAGNOSIS — E55.9 HYPOVITAMINOSIS D: ICD-10-CM

## 2019-11-06 DIAGNOSIS — B35.1 ONYCHOMYCOSIS: ICD-10-CM

## 2019-11-06 DIAGNOSIS — E11.8 CONTROLLED TYPE 2 DIABETES MELLITUS WITH COMPLICATION, WITH LONG-TERM CURRENT USE OF INSULIN (HCC): ICD-10-CM

## 2019-11-06 DIAGNOSIS — K59.1 FUNCTIONAL DIARRHEA: ICD-10-CM

## 2019-11-06 DIAGNOSIS — N64.3 GALACTORRHEA: ICD-10-CM

## 2019-11-06 DIAGNOSIS — F20.9 SCHIZOPHRENIA, UNSPECIFIED TYPE (HCC): ICD-10-CM

## 2019-11-06 DIAGNOSIS — Z86.79 HISTORY OF ATRIAL FIBRILLATION: ICD-10-CM

## 2019-11-06 DIAGNOSIS — G47.33 OSA (OBSTRUCTIVE SLEEP APNEA): ICD-10-CM

## 2019-11-06 DIAGNOSIS — E06.3 HASHIMOTO'S ENCEPHALOPATHY: ICD-10-CM

## 2019-11-06 DIAGNOSIS — R00.2 PALPITATIONS: ICD-10-CM

## 2019-11-06 DIAGNOSIS — G63 POLYNEUROPATHY ASSOCIATED WITH UNDERLYING DISEASE (HCC): Chronic | ICD-10-CM

## 2019-11-06 DIAGNOSIS — M79.672 HEEL PAIN, BILATERAL: ICD-10-CM

## 2019-11-06 DIAGNOSIS — L03.115 CELLULITIS OF RIGHT LOWER EXTREMITY: ICD-10-CM

## 2019-11-06 DIAGNOSIS — R10.30 LOWER ABDOMINAL PAIN: ICD-10-CM

## 2019-11-06 DIAGNOSIS — J96.11 CHRONIC RESPIRATORY FAILURE WITH HYPOXIA, ON HOME OXYGEN THERAPY (HCC): ICD-10-CM

## 2019-11-06 DIAGNOSIS — R26.2 INABILITY TO AMBULATE DUE TO RIGHT ANKLE OR FOOT: ICD-10-CM

## 2019-11-06 DIAGNOSIS — E28.2 PCOS (POLYCYSTIC OVARIAN SYNDROME): ICD-10-CM

## 2019-11-06 DIAGNOSIS — R15.9 BOWEL AND BLADDER INCONTINENCE: ICD-10-CM

## 2019-11-06 DIAGNOSIS — M79.671 HEEL PAIN, BILATERAL: ICD-10-CM

## 2019-11-06 DIAGNOSIS — G89.29 CHRONIC PAIN OF RIGHT KNEE: ICD-10-CM

## 2019-11-06 DIAGNOSIS — R32 BOWEL AND BLADDER INCONTINENCE: ICD-10-CM

## 2019-11-06 DIAGNOSIS — S21.009D BREAST WOUND, UNSPECIFIED LATERALITY, SUBSEQUENT ENCOUNTER: ICD-10-CM

## 2019-11-06 DIAGNOSIS — F60.3 BORDERLINE PERSONALITY DISORDER IN ADULT (HCC): ICD-10-CM

## 2019-11-06 DIAGNOSIS — Z99.81 CHRONIC RESPIRATORY FAILURE WITH HYPOXIA, ON HOME OXYGEN THERAPY (HCC): ICD-10-CM

## 2019-11-06 DIAGNOSIS — R29.898 WEAKNESS OF BOTH LOWER EXTREMITIES: ICD-10-CM

## 2019-11-06 DIAGNOSIS — M41.9 SCOLIOSIS, UNSPECIFIED SCOLIOSIS TYPE, UNSPECIFIED SPINAL REGION: ICD-10-CM

## 2019-11-06 DIAGNOSIS — Z79.4 CONTROLLED TYPE 2 DIABETES MELLITUS WITH COMPLICATION, WITH LONG-TERM CURRENT USE OF INSULIN (HCC): ICD-10-CM

## 2019-11-06 DIAGNOSIS — G93.49 HASHIMOTO'S ENCEPHALOPATHY: ICD-10-CM

## 2019-11-06 DIAGNOSIS — D84.9 IMMUNOCOMPROMISED (HCC): ICD-10-CM

## 2019-11-06 DIAGNOSIS — H46.9 OPTIC NEURITIS: ICD-10-CM

## 2019-11-06 DIAGNOSIS — F32.5 MAJOR DEPRESSIVE DISORDER WITH SINGLE EPISODE, IN FULL REMISSION (HCC): ICD-10-CM

## 2019-11-06 DIAGNOSIS — K21.9 GASTROESOPHAGEAL REFLUX DISEASE WITHOUT ESOPHAGITIS: Chronic | ICD-10-CM

## 2019-11-06 DIAGNOSIS — Z98.890 H/O PRIOR ABLATION TREATMENT: ICD-10-CM

## 2019-11-06 DIAGNOSIS — E66.01 MORBIDLY OBESE (HCC): ICD-10-CM

## 2019-11-06 DIAGNOSIS — F41.9 ANXIETY: ICD-10-CM

## 2019-11-06 DIAGNOSIS — R53.81 DEBILITY: ICD-10-CM

## 2019-11-06 DIAGNOSIS — R53.1 PROGRESSIVE FOCAL MOTOR WEAKNESS: ICD-10-CM

## 2019-11-06 DIAGNOSIS — E03.8 OTHER SPECIFIED HYPOTHYROIDISM: ICD-10-CM

## 2019-11-06 DIAGNOSIS — K76.0 FATTY LIVER DISEASE, NONALCOHOLIC: ICD-10-CM

## 2019-11-06 DIAGNOSIS — N31.9 NEUROGENIC BLADDER: ICD-10-CM

## 2019-11-06 LAB
ALBUMIN SERPL BCP-MCNC: 4.3 G/DL (ref 3.2–4.9)
ALBUMIN/GLOB SERPL: 2.3 G/DL
ALP SERPL-CCNC: 43 U/L (ref 30–99)
ALT SERPL-CCNC: 37 U/L (ref 2–50)
ANION GAP SERPL CALC-SCNC: 13 MMOL/L (ref 0–11.9)
AST SERPL-CCNC: 18 U/L (ref 12–45)
BASOPHILS # BLD AUTO: 0.5 % (ref 0–1.8)
BASOPHILS # BLD: 0.03 K/UL (ref 0–0.12)
BILIRUB SERPL-MCNC: 0.4 MG/DL (ref 0.1–1.5)
BUN SERPL-MCNC: 8 MG/DL (ref 8–22)
CALCIUM SERPL-MCNC: 9.2 MG/DL (ref 8.5–10.5)
CHLORIDE SERPL-SCNC: 109 MMOL/L (ref 96–112)
CO2 SERPL-SCNC: 19 MMOL/L (ref 20–33)
CREAT SERPL-MCNC: 0.64 MG/DL (ref 0.5–1.4)
CRP SERPL HS-MCNC: 1.66 MG/DL (ref 0–0.75)
EOSINOPHIL # BLD AUTO: 0.07 K/UL (ref 0–0.51)
EOSINOPHIL NFR BLD: 1.1 % (ref 0–6.9)
ERYTHROCYTE [DISTWIDTH] IN BLOOD BY AUTOMATED COUNT: 43.2 FL (ref 35.9–50)
ERYTHROCYTE [SEDIMENTATION RATE] IN BLOOD BY WESTERGREN METHOD: 13 MM/HOUR (ref 0–20)
GLOBULIN SER CALC-MCNC: 1.9 G/DL (ref 1.9–3.5)
GLUCOSE BLD-MCNC: 130 MG/DL (ref 65–99)
GLUCOSE SERPL-MCNC: 109 MG/DL (ref 65–99)
HCT VFR BLD AUTO: 39.3 % (ref 37–47)
HGB BLD-MCNC: 13.5 G/DL (ref 12–16)
IMM GRANULOCYTES # BLD AUTO: 0.03 K/UL (ref 0–0.11)
IMM GRANULOCYTES NFR BLD AUTO: 0.5 % (ref 0–0.9)
INR PPP: 0.93 (ref 0.87–1.13)
LYMPHOCYTES # BLD AUTO: 1.58 K/UL (ref 1–4.8)
LYMPHOCYTES NFR BLD: 25.2 % (ref 22–41)
MCH RBC QN AUTO: 31.9 PG (ref 27–33)
MCHC RBC AUTO-ENTMCNC: 34.4 G/DL (ref 33.6–35)
MCV RBC AUTO: 92.9 FL (ref 81.4–97.8)
MONOCYTES # BLD AUTO: 0.52 K/UL (ref 0–0.85)
MONOCYTES NFR BLD AUTO: 8.3 % (ref 0–13.4)
NEUTROPHILS # BLD AUTO: 4.04 K/UL (ref 2–7.15)
NEUTROPHILS NFR BLD: 64.4 % (ref 44–72)
NRBC # BLD AUTO: 0 K/UL
NRBC BLD-RTO: 0 /100 WBC
PLATELET # BLD AUTO: 192 K/UL (ref 164–446)
PMV BLD AUTO: 11.9 FL (ref 9–12.9)
POTASSIUM SERPL-SCNC: 3.9 MMOL/L (ref 3.6–5.5)
PROT SERPL-MCNC: 6.2 G/DL (ref 6–8.2)
PROTHROMBIN TIME: 12.7 SEC (ref 12–14.6)
RBC # BLD AUTO: 4.23 M/UL (ref 4.2–5.4)
SODIUM SERPL-SCNC: 141 MMOL/L (ref 135–145)
WBC # BLD AUTO: 6.3 K/UL (ref 4.8–10.8)

## 2019-11-06 PROCEDURE — 700102 HCHG RX REV CODE 250 W/ 637 OVERRIDE(OP): Performed by: EMERGENCY MEDICINE

## 2019-11-06 PROCEDURE — 85025 COMPLETE CBC W/AUTO DIFF WBC: CPT

## 2019-11-06 PROCEDURE — 73630 X-RAY EXAM OF FOOT: CPT | Mod: LT

## 2019-11-06 PROCEDURE — 82962 GLUCOSE BLOOD TEST: CPT

## 2019-11-06 PROCEDURE — 700105 HCHG RX REV CODE 258: Performed by: EMERGENCY MEDICINE

## 2019-11-06 PROCEDURE — 85652 RBC SED RATE AUTOMATED: CPT

## 2019-11-06 PROCEDURE — 99285 EMERGENCY DEPT VISIT HI MDM: CPT

## 2019-11-06 PROCEDURE — 80053 COMPREHEN METABOLIC PANEL: CPT

## 2019-11-06 PROCEDURE — 99223 1ST HOSP IP/OBS HIGH 75: CPT | Performed by: INTERNAL MEDICINE

## 2019-11-06 PROCEDURE — 700102 HCHG RX REV CODE 250 W/ 637 OVERRIDE(OP): Performed by: INTERNAL MEDICINE

## 2019-11-06 PROCEDURE — 85610 PROTHROMBIN TIME: CPT

## 2019-11-06 PROCEDURE — 36415 COLL VENOUS BLD VENIPUNCTURE: CPT

## 2019-11-06 PROCEDURE — 96367 TX/PROPH/DG ADDL SEQ IV INF: CPT

## 2019-11-06 PROCEDURE — 96366 THER/PROPH/DIAG IV INF ADDON: CPT

## 2019-11-06 PROCEDURE — A9270 NON-COVERED ITEM OR SERVICE: HCPCS | Performed by: INTERNAL MEDICINE

## 2019-11-06 PROCEDURE — 700111 HCHG RX REV CODE 636 W/ 250 OVERRIDE (IP): Performed by: EMERGENCY MEDICINE

## 2019-11-06 PROCEDURE — 96365 THER/PROPH/DIAG IV INF INIT: CPT

## 2019-11-06 PROCEDURE — 700111 HCHG RX REV CODE 636 W/ 250 OVERRIDE (IP): Performed by: INTERNAL MEDICINE

## 2019-11-06 PROCEDURE — 86140 C-REACTIVE PROTEIN: CPT

## 2019-11-06 PROCEDURE — A9270 NON-COVERED ITEM OR SERVICE: HCPCS | Performed by: EMERGENCY MEDICINE

## 2019-11-06 PROCEDURE — 73630 X-RAY EXAM OF FOOT: CPT | Mod: RT

## 2019-11-06 PROCEDURE — 770006 HCHG ROOM/CARE - MED/SURG/GYN SEMI*

## 2019-11-06 PROCEDURE — 87040 BLOOD CULTURE FOR BACTERIA: CPT

## 2019-11-06 RX ORDER — MONTELUKAST SODIUM 10 MG/1
10 TABLET ORAL DAILY
COMMUNITY
End: 2020-02-24

## 2019-11-06 RX ORDER — IPRATROPIUM BROMIDE AND ALBUTEROL SULFATE 2.5; .5 MG/3ML; MG/3ML
3 SOLUTION RESPIRATORY (INHALATION) EVERY 6 HOURS PRN
Status: DISCONTINUED | OUTPATIENT
Start: 2019-11-06 | End: 2019-11-12 | Stop reason: HOSPADM

## 2019-11-06 RX ORDER — POTASSIUM CHLORIDE 20 MEQ/1
20 TABLET, EXTENDED RELEASE ORAL 2 TIMES DAILY
Status: DISCONTINUED | OUTPATIENT
Start: 2019-11-06 | End: 2019-11-12 | Stop reason: HOSPADM

## 2019-11-06 RX ORDER — METHOCARBAMOL 750 MG/1
750 TABLET, FILM COATED ORAL 3 TIMES DAILY
Status: DISCONTINUED | OUTPATIENT
Start: 2019-11-06 | End: 2019-11-12 | Stop reason: HOSPADM

## 2019-11-06 RX ORDER — BISACODYL 10 MG
10 SUPPOSITORY, RECTAL RECTAL
Status: DISCONTINUED | OUTPATIENT
Start: 2019-11-06 | End: 2019-11-12 | Stop reason: HOSPADM

## 2019-11-06 RX ORDER — ZIPRASIDONE HYDROCHLORIDE 80 MG/1
80 CAPSULE ORAL 2 TIMES DAILY
Status: DISCONTINUED | OUTPATIENT
Start: 2019-11-06 | End: 2019-11-12 | Stop reason: HOSPADM

## 2019-11-06 RX ORDER — PROCHLORPERAZINE EDISYLATE 5 MG/ML
5-10 INJECTION INTRAMUSCULAR; INTRAVENOUS EVERY 4 HOURS PRN
Status: DISCONTINUED | OUTPATIENT
Start: 2019-11-06 | End: 2019-11-12 | Stop reason: HOSPADM

## 2019-11-06 RX ORDER — PROMETHAZINE HYDROCHLORIDE 25 MG/1
12.5-25 TABLET ORAL EVERY 4 HOURS PRN
Status: DISCONTINUED | OUTPATIENT
Start: 2019-11-06 | End: 2019-11-12 | Stop reason: HOSPADM

## 2019-11-06 RX ORDER — PREGABALIN 300 MG/1
300 CAPSULE ORAL 2 TIMES DAILY
Status: ON HOLD | COMMUNITY
End: 2019-12-31

## 2019-11-06 RX ORDER — LEVOTHYROXINE SODIUM 0.07 MG/1
75 TABLET ORAL
Status: DISCONTINUED | OUTPATIENT
Start: 2019-11-07 | End: 2019-11-12 | Stop reason: HOSPADM

## 2019-11-06 RX ORDER — FLUOXETINE HYDROCHLORIDE 40 MG/1
40 CAPSULE ORAL DAILY
Status: DISCONTINUED | OUTPATIENT
Start: 2019-11-07 | End: 2019-11-06

## 2019-11-06 RX ORDER — OXYCODONE HYDROCHLORIDE 5 MG/1
5 TABLET ORAL ONCE
Status: COMPLETED | OUTPATIENT
Start: 2019-11-06 | End: 2019-11-06

## 2019-11-06 RX ORDER — RANITIDINE 300 MG/1
300 TABLET ORAL EVERY MORNING
Status: DISCONTINUED | OUTPATIENT
Start: 2019-11-07 | End: 2019-11-06

## 2019-11-06 RX ORDER — AMOXICILLIN AND CLAVULANATE POTASSIUM 875; 125 MG/1; MG/1
1 TABLET, FILM COATED ORAL 2 TIMES DAILY
Status: ON HOLD | COMMUNITY
Start: 2019-10-31 | End: 2019-11-07

## 2019-11-06 RX ORDER — TIOTROPIUM BROMIDE 18 UG/1
1 CAPSULE ORAL; RESPIRATORY (INHALATION) DAILY
Status: DISCONTINUED | OUTPATIENT
Start: 2019-11-07 | End: 2019-11-12 | Stop reason: HOSPADM

## 2019-11-06 RX ORDER — ACETAMINOPHEN 325 MG/1
650 TABLET ORAL EVERY 6 HOURS PRN
Status: DISCONTINUED | OUTPATIENT
Start: 2019-11-06 | End: 2019-11-12 | Stop reason: HOSPADM

## 2019-11-06 RX ORDER — BUSPIRONE HYDROCHLORIDE 10 MG/1
10 TABLET ORAL 2 TIMES DAILY
COMMUNITY
End: 2020-02-11

## 2019-11-06 RX ORDER — BUSPIRONE HYDROCHLORIDE 10 MG/1
10 TABLET ORAL 2 TIMES DAILY
Status: DISCONTINUED | OUTPATIENT
Start: 2019-11-06 | End: 2019-11-07

## 2019-11-06 RX ORDER — ZIPRASIDONE HYDROCHLORIDE 80 MG/1
80 CAPSULE ORAL 2 TIMES DAILY
Status: ON HOLD | COMMUNITY
End: 2019-12-31 | Stop reason: SDUPTHER

## 2019-11-06 RX ORDER — AMOXICILLIN 250 MG
2 CAPSULE ORAL 2 TIMES DAILY
Status: DISCONTINUED | OUTPATIENT
Start: 2019-11-06 | End: 2019-11-12 | Stop reason: HOSPADM

## 2019-11-06 RX ORDER — PREGABALIN 150 MG/1
300 CAPSULE ORAL 2 TIMES DAILY
Status: DISCONTINUED | OUTPATIENT
Start: 2019-11-06 | End: 2019-11-12 | Stop reason: HOSPADM

## 2019-11-06 RX ORDER — GABAPENTIN 300 MG/1
300 CAPSULE ORAL 4 TIMES DAILY
Status: DISCONTINUED | OUTPATIENT
Start: 2019-11-06 | End: 2019-11-10

## 2019-11-06 RX ORDER — FOLIC ACID 1 MG/1
1 TABLET ORAL DAILY
Status: DISCONTINUED | OUTPATIENT
Start: 2019-11-07 | End: 2019-11-12 | Stop reason: HOSPADM

## 2019-11-06 RX ORDER — MONTELUKAST SODIUM 10 MG/1
10 TABLET ORAL DAILY
Status: DISCONTINUED | OUTPATIENT
Start: 2019-11-07 | End: 2019-11-12 | Stop reason: HOSPADM

## 2019-11-06 RX ORDER — OXYCODONE HYDROCHLORIDE 5 MG/1
5 TABLET ORAL
Status: DISCONTINUED | OUTPATIENT
Start: 2019-11-06 | End: 2019-11-10

## 2019-11-06 RX ORDER — LEVOTHYROXINE SODIUM 0.07 MG/1
75 TABLET ORAL
Status: ON HOLD | COMMUNITY
End: 2020-03-24

## 2019-11-06 RX ORDER — FUROSEMIDE 40 MG/1
80 TABLET ORAL DAILY
Status: DISCONTINUED | OUTPATIENT
Start: 2019-11-07 | End: 2019-11-12 | Stop reason: HOSPADM

## 2019-11-06 RX ORDER — SODIUM CHLORIDE 9 MG/ML
INJECTION, SOLUTION INTRAVENOUS CONTINUOUS
Status: DISCONTINUED | OUTPATIENT
Start: 2019-11-06 | End: 2019-11-07

## 2019-11-06 RX ORDER — ONDANSETRON 4 MG/1
4 TABLET, FILM COATED ORAL EVERY 4 HOURS PRN
Status: DISCONTINUED | OUTPATIENT
Start: 2019-11-06 | End: 2019-11-06

## 2019-11-06 RX ORDER — INSULIN GLARGINE 100 [IU]/ML
0.2 INJECTION, SOLUTION SUBCUTANEOUS EVERY EVENING
Status: DISCONTINUED | OUTPATIENT
Start: 2019-11-06 | End: 2019-11-07

## 2019-11-06 RX ORDER — PREDNISONE 10 MG/1
10 TABLET ORAL EVERY MORNING
Status: DISCONTINUED | OUTPATIENT
Start: 2019-11-07 | End: 2019-11-12 | Stop reason: HOSPADM

## 2019-11-06 RX ORDER — ONDANSETRON 4 MG/1
4 TABLET, ORALLY DISINTEGRATING ORAL EVERY 6 HOURS PRN
COMMUNITY
End: 2020-07-10

## 2019-11-06 RX ORDER — POLYETHYLENE GLYCOL 3350 17 G/17G
1 POWDER, FOR SOLUTION ORAL
Status: DISCONTINUED | OUTPATIENT
Start: 2019-11-06 | End: 2019-11-12 | Stop reason: HOSPADM

## 2019-11-06 RX ORDER — FLUCONAZOLE 100 MG/1
200 TABLET ORAL ONCE
Status: COMPLETED | OUTPATIENT
Start: 2019-11-06 | End: 2019-11-06

## 2019-11-06 RX ORDER — LACTULOSE 20 G/30ML
15 SOLUTION ORAL 2 TIMES DAILY PRN
Status: DISCONTINUED | OUTPATIENT
Start: 2019-11-06 | End: 2019-11-06

## 2019-11-06 RX ORDER — MYCOPHENOLATE MOFETIL 250 MG/1
1000 CAPSULE ORAL 2 TIMES DAILY
Status: DISCONTINUED | OUTPATIENT
Start: 2019-11-06 | End: 2019-11-12 | Stop reason: HOSPADM

## 2019-11-06 RX ORDER — POTASSIUM CHLORIDE 20 MEQ/1
40 TABLET, EXTENDED RELEASE ORAL 2 TIMES DAILY
COMMUNITY
End: 2021-01-05

## 2019-11-06 RX ORDER — HYDROMORPHONE HYDROCHLORIDE 1 MG/ML
0.5 INJECTION, SOLUTION INTRAMUSCULAR; INTRAVENOUS; SUBCUTANEOUS
Status: DISCONTINUED | OUTPATIENT
Start: 2019-11-06 | End: 2019-11-10

## 2019-11-06 RX ORDER — TRAZODONE HYDROCHLORIDE 50 MG/1
100 TABLET ORAL EVERY EVENING
Status: DISCONTINUED | OUTPATIENT
Start: 2019-11-06 | End: 2019-11-12 | Stop reason: HOSPADM

## 2019-11-06 RX ORDER — ACETAMINOPHEN 325 MG/1
650 TABLET ORAL ONCE
Status: COMPLETED | OUTPATIENT
Start: 2019-11-06 | End: 2019-11-06

## 2019-11-06 RX ORDER — FLUCONAZOLE 100 MG/1
150 TABLET ORAL DAILY
Status: DISCONTINUED | OUTPATIENT
Start: 2019-11-07 | End: 2019-11-07

## 2019-11-06 RX ORDER — CHOLECALCIFEROL (VITAMIN D3) 125 MCG
5 CAPSULE ORAL NIGHTLY PRN
Status: DISCONTINUED | OUTPATIENT
Start: 2019-11-06 | End: 2019-11-06

## 2019-11-06 RX ORDER — SODIUM CHLORIDE, SODIUM LACTATE, POTASSIUM CHLORIDE, AND CALCIUM CHLORIDE .6; .31; .03; .02 G/100ML; G/100ML; G/100ML; G/100ML
500 INJECTION, SOLUTION INTRAVENOUS
Status: DISCONTINUED | OUTPATIENT
Start: 2019-11-06 | End: 2019-11-12 | Stop reason: HOSPADM

## 2019-11-06 RX ORDER — ONDANSETRON 2 MG/ML
4 INJECTION INTRAMUSCULAR; INTRAVENOUS EVERY 4 HOURS PRN
Status: DISCONTINUED | OUTPATIENT
Start: 2019-11-06 | End: 2019-11-12 | Stop reason: HOSPADM

## 2019-11-06 RX ORDER — SODIUM BICARBONATE 650 MG/1
650 TABLET ORAL 2 TIMES DAILY
Status: DISCONTINUED | OUTPATIENT
Start: 2019-11-06 | End: 2019-11-12 | Stop reason: HOSPADM

## 2019-11-06 RX ORDER — PROMETHAZINE HYDROCHLORIDE 12.5 MG/1
12.5-25 SUPPOSITORY RECTAL EVERY 4 HOURS PRN
Status: DISCONTINUED | OUTPATIENT
Start: 2019-11-06 | End: 2019-11-12 | Stop reason: HOSPADM

## 2019-11-06 RX ORDER — ONDANSETRON 4 MG/1
4 TABLET, ORALLY DISINTEGRATING ORAL EVERY 4 HOURS PRN
Status: DISCONTINUED | OUTPATIENT
Start: 2019-11-06 | End: 2019-11-12 | Stop reason: HOSPADM

## 2019-11-06 RX ORDER — TRAZODONE HYDROCHLORIDE 50 MG/1
100 TABLET ORAL EVERY MORNING
Status: DISCONTINUED | OUTPATIENT
Start: 2019-11-07 | End: 2019-11-06

## 2019-11-06 RX ORDER — ALBUTEROL SULFATE 90 UG/1
2 AEROSOL, METERED RESPIRATORY (INHALATION) EVERY 6 HOURS PRN
Status: DISCONTINUED | OUTPATIENT
Start: 2019-11-06 | End: 2019-11-12 | Stop reason: HOSPADM

## 2019-11-06 RX ORDER — OXYCODONE HYDROCHLORIDE 10 MG/1
10 TABLET ORAL
Status: DISCONTINUED | OUTPATIENT
Start: 2019-11-06 | End: 2019-11-07

## 2019-11-06 RX ADMIN — FLUCONAZOLE 200 MG: 100 TABLET ORAL at 16:18

## 2019-11-06 RX ADMIN — ACETAMINOPHEN 650 MG: 325 TABLET, FILM COATED ORAL at 16:18

## 2019-11-06 RX ADMIN — MYCOPHENOLATE MOFETIL 1000 MG: 250 CAPSULE ORAL at 21:42

## 2019-11-06 RX ADMIN — VANCOMYCIN HYDROCHLORIDE 3 G: 500 INJECTION, POWDER, LYOPHILIZED, FOR SOLUTION INTRAVENOUS at 17:03

## 2019-11-06 RX ADMIN — CEFEPIME 2 G: 2 INJECTION, POWDER, FOR SOLUTION INTRAVENOUS at 16:18

## 2019-11-06 RX ADMIN — SODIUM BICARBONATE 650 MG: 650 TABLET ORAL at 21:41

## 2019-11-06 RX ADMIN — ZIPRASIDONE HYDROCHLORIDE 80 MG: 80 CAPSULE ORAL at 21:42

## 2019-11-06 RX ADMIN — POTASSIUM CHLORIDE 20 MEQ: 20 TABLET, EXTENDED RELEASE ORAL at 20:21

## 2019-11-06 RX ADMIN — BUSPIRONE HYDROCHLORIDE 10 MG: 10 TABLET ORAL at 20:14

## 2019-11-06 RX ADMIN — OXYCODONE HYDROCHLORIDE 10 MG: 10 TABLET ORAL at 20:22

## 2019-11-06 RX ADMIN — TRAZODONE HYDROCHLORIDE 100 MG: 100 TABLET ORAL at 23:28

## 2019-11-06 RX ADMIN — GABAPENTIN 300 MG: 300 CAPSULE ORAL at 20:14

## 2019-11-06 RX ADMIN — VANCOMYCIN HYDROCHLORIDE 125 MG: 10 INJECTION, POWDER, LYOPHILIZED, FOR SOLUTION INTRAVENOUS at 21:43

## 2019-11-06 RX ADMIN — PREGABALIN 300 MG: 150 CAPSULE ORAL at 20:14

## 2019-11-06 RX ADMIN — OXYCODONE HYDROCHLORIDE 5 MG: 5 TABLET ORAL at 16:18

## 2019-11-06 RX ADMIN — METHOCARBAMOL TABLETS 750 MG: 750 TABLET, COATED ORAL at 21:41

## 2019-11-06 ASSESSMENT — ENCOUNTER SYMPTOMS
HEADACHES: 0
DIARRHEA: 0
LOSS OF CONSCIOUSNESS: 0
COUGH: 0
BLOOD IN STOOL: 0
WHEEZING: 0
INSOMNIA: 0
WEAKNESS: 0
TREMORS: 0
HEMOPTYSIS: 0
NERVOUS/ANXIOUS: 0
MYALGIAS: 1
ABDOMINAL PAIN: 0
CHILLS: 1
VOMITING: 0
FOCAL WEAKNESS: 0
EYE PAIN: 0
SHORTNESS OF BREATH: 0
SEIZURES: 0
NAUSEA: 0
EYE REDNESS: 0
CONSTIPATION: 0
FEVER: 1
DIZZINESS: 0
PALPITATIONS: 0
FALLS: 0

## 2019-11-06 ASSESSMENT — COGNITIVE AND FUNCTIONAL STATUS - GENERAL
DRESSING REGULAR UPPER BODY CLOTHING: A LITTLE
CLIMB 3 TO 5 STEPS WITH RAILING: TOTAL
STANDING UP FROM CHAIR USING ARMS: A LOT
MOBILITY SCORE: 10
WALKING IN HOSPITAL ROOM: TOTAL
MOVING TO AND FROM BED TO CHAIR: A LITTLE
TURNING FROM BACK TO SIDE WHILE IN FLAT BAD: A LOT
SUGGESTED CMS G CODE MODIFIER DAILY ACTIVITY: CK
DRESSING REGULAR LOWER BODY CLOTHING: A LITTLE
TOILETING: A LOT
HELP NEEDED FOR BATHING: A LOT
DAILY ACTIVITIY SCORE: 18
SUGGESTED CMS G CODE MODIFIER MOBILITY: CL
MOVING FROM LYING ON BACK TO SITTING ON SIDE OF FLAT BED: UNABLE

## 2019-11-06 ASSESSMENT — LIFESTYLE VARIABLES
TOTAL SCORE: 0
AVERAGE NUMBER OF DAYS PER WEEK YOU HAVE A DRINK CONTAINING ALCOHOL: 0
TOTAL SCORE: 0
HAVE PEOPLE ANNOYED YOU BY CRITICIZING YOUR DRINKING: NO
EVER HAD A DRINK FIRST THING IN THE MORNING TO STEADY YOUR NERVES TO GET RID OF A HANGOVER: NO
EVER FELT BAD OR GUILTY ABOUT YOUR DRINKING: NO
EVER_SMOKED: NEVER
DO YOU DRINK ALCOHOL: NO
ON A TYPICAL DAY WHEN YOU DRINK ALCOHOL HOW MANY DRINKS DO YOU HAVE: 0
HOW MANY TIMES IN THE PAST YEAR HAVE YOU HAD 5 OR MORE DRINKS IN A DAY: 0
ALCOHOL_USE: NO
CONSUMPTION TOTAL: NEGATIVE
HAVE YOU EVER FELT YOU SHOULD CUT DOWN ON YOUR DRINKING: NO
TOTAL SCORE: 0

## 2019-11-06 NOTE — ED TRIAGE NOTES
Chief Complaint   Patient presents with   • Foot Pain     Pt in wheelchair to triage with above complaint.  Pt states she was seen at this facility last week and was dx with bilateral foot cellulitis.  Pt was given antibiotics at home.  Pt states yesterday she saw her primary care physician and was told that it was cleared up and that she could stop the antibx.  Pt states it is too painful to stand on her feet and she has been falling due to the pain in her feet.  Pt has bilateral foot swelling and redness to her heels.     Pt instructed to triage process and advised to alert staff of any changes.  Pt returned to [facility].  Pt states understanding.

## 2019-11-06 NOTE — ED PROVIDER NOTES
"Scribed for Jimmy Abbott M.D. by Chase Bernal. 11/6/2019  2:55 PM    Primary care provider: Torres Brody M.D.  Means of arrival: EMS  History obtained from: Patient  History limited by: None    CHIEF COMPLAINT  Chief Complaint   Patient presents with   • Foot Pain       HPI  Kristin Balderrama is a 29 y.o. female who presents to the Emergency Department for acute, significant bilateral lower foot/heel pain onset this morning. Patient was discharged last week for bilateral cellulitis/concerns for C. diff and prescribed Augmentin and Vancomycin. She was seen by her PCP yesterday after reports averse side effects of vancomycin, and they informed the patient her infection was cleared. Today, the patient reports that her foot pain is worsening and she fell to the ground earlier today due to the pain. She describes the pain as \"burning\", and has erythema and warmth to both feet. Patient additionally reports running a low grade fever PTA, which was alleviated with Tylenol. Denies any purulent discharge or abdominal pain. She notes that she usually goes barefoot, and denies any new footwear.     REVIEW OF SYSTEMS  Pertinent positives include: bilateral foot pain with erythema and warmth, tactile fever (resolved). Pertinent negatives include: purulent discharge or abdominal pain. See history of present illness. All other systems are negative.     PAST MEDICAL HISTORY   has a past medical history of Abdominal pain, Anginal syndrome, Apnea, sleep, Arrhythmia, Arthritis, ASTHMA, Atrial fibrillation (HCC), Back pain, Borderline personality disorder (HCC), Breath shortness, Bronchitis, Cardiac arrhythmia, Chickenpox, Chronic UTI (9/18/2010), Cough, Daytime sleepiness, Depression, Diabetes (HCC), Diarrhea, Disorder of thyroid, Fall, Fatigue, Frequent headaches, Gasping for breath, Gynecological disorder, Headache(784.0), Heart burn, History of falling, Hypertension, Indigestion, Migraine, 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HCC), " 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HCC), Nausea, Obesity, Pain (08-15-12), Painful joint, PCOS (polycystic ovarian syndrome), Pneumonia (2012), Psychosis ([facility]), Renal disorder, Ringing in ears, Scoliosis, Shortness of breath, Sinus tachycardia (10/31/2013), Sleep apnea, Snoring, Tonsillitis, Tuberculosis, Urinary bladder disorder, Urinary incontinence, Weakness, and Wears glasses.    SURGICAL HISTORY   has a past surgical history that includes neuro dest facet l/s w/ig sngl (4/21/2015); recovery (1/27/2016); delmar by laparoscopy (8/29/2010); lumbar fusion anterior (8/21/2012); other cardiac surgery (1/2016); tonsillectomy; bowel stimulator insertion (7/13/2016); gastroscopy with balloon dilatation (N/A, 1/4/2017); muscle biopsy (Right, 1/26/2017); other abdominal surgery; and laminotomy.    SOCIAL HISTORY  Social History     Tobacco Use   • Smoking status: Never Smoker   • Smokeless tobacco: Never Used   Substance Use Topics   • Alcohol use: No     Alcohol/week: 0.0 oz   • Drug use: Yes     Frequency: 7.0 times per week     Types: Marijuana, Oral     Comment: Medicinal edible's      Social History     Substance and Sexual Activity   Drug Use Yes   • Frequency: 7.0 times per week   • Types: Marijuana, Oral    Comment: Medicinal edible's       FAMILY HISTORY  Family History   Problem Relation Age of Onset   • Hypertension Mother    • Sleep Apnea Mother    • Heart Disease Mother    • Other Mother         hypothryod   • Hypertension Maternal Uncle    • Heart Disease Maternal Grandmother    • Hypertension Maternal Grandmother    • No Known Problems Sister    • Other Sister         Narcolepsy;fibromyalsia;bone;nerve   • Genitourinary () Problems Sister         endometriosis       CURRENT MEDICATIONS  Current Outpatient Medications:   •  amoxicillin-clavulanate (AUGMENTIN) 875-125 MG Tab, Take 1 Tab by mouth 2 times a day for 10 days., Disp: 20 Tab, Rfl: 0  •  potassium chloride SA (KDUR) 20 MEQ Tab CR, Take 1 Tab by mouth 2  times a day., Disp: 90 Tab, Rfl: 3  •  acetaminophen (TYLENOL) 325 MG Tab, Take 2 Tabs by mouth every 6 hours as needed (Mild Pain; (Pain scale 1-3); Temp greater than 100.5 F)., Disp: 30 Tab, Rfl: 0  •  lactulose 20 GM/30ML Solution, Take 15 mL by mouth 2 times a day as needed. Indications: Chronic Constipation, Disp: , Rfl:   •  traZODone (DESYREL) 100 MG Tab, Take 100 mg by mouth every morning., Disp: , Rfl:   •  predniSONE (DELTASONE) 10 MG Tab, Take 10 mg by mouth every morning., Disp: , Rfl:   •  ranitidine (ZANTAC) 300 MG tablet, Take 300 mg by mouth every morning., Disp: , Rfl:   •  diphenhydrAMINE-APAP, sleep, (TYLENOL PM EXTRA STRENGTH)  MG Tab, Take 2 Tabs by mouth every morning., Disp: , Rfl:   •  Diclofenac Sodium (VOLTAREN) 1 % Gel, Apply 1 Application to skin as directed 4 times a day as needed (on wrists, back, ankles, and feet)., Disp: , Rfl:   •  busPIRone (BUSPAR) 10 MG Tab tablet, Take 1 Tab by mouth 2 times a day., Disp: 60 Tab, Rfl: 1  •  fluoxetine (PROZAC) 40 MG capsule, Take 1 Cap by mouth every day., Disp: 30 Cap, Rfl: 1  •  ziprasidone (GEODON) 80 MG Cap, Take 1 Cap by mouth 2 Times a Day., Disp: 60 Cap, Rfl: 1  •  montelukast (SINGULAIR) 10 MG Tab, Take 1 Tab by mouth every day., Disp: 90 Tab, Rfl: 3  •  tiotropium (SPIRIVA) 18 MCG Cap, Inhale 1 Cap by mouth every day., Disp: 90 Cap, Rfl: 3  •  Folic Acid 0.8 MG Cap, Take 0.8 mg by mouth every day., Disp: , Rfl:   •  Ivabradine HCl (CORLANOR) 7.5 MG Tab, Take 7.5 mg by mouth 2 Times a Day., Disp: , Rfl:   •  ipratropium-albuterol (DUONEB) 0.5-2.5 (3) MG/3ML nebulizer solution, 3 mL by Nebulization route every 6 hours as needed for Shortness of Breath., Disp: 60 Bullet, Rfl: 1  •  etonogestrel (NEXPLANON) 68 MG Implant implant, Inject 1 Each as instructed Once., Disp: , Rfl:   •  mycophenolate (CELLCEPT) 500 MG tablet, Take 1,000 mg by mouth 2 times a day., Disp: , Rfl:   •  levothyroxine (SYNTHROID) 75 MCG Tab, Take 1 Tab by mouth  "Every morning on an empty stomach., Disp: 90 Tab, Rfl: 1  •  gabapentin (NEURONTIN) 300 MG Cap, Take 300 mg by mouth 4 times a day., Disp: , Rfl:   •  Dulaglutide (TRULICITY) 0.75 MG/0.5ML Solution Pen-injector, Inject 1.5 mg as instructed every Friday., Disp: , Rfl:   •  ondansetron (ZOFRAN) 4 MG Tab tablet, Take 1 Tab by mouth every four hours as needed for Nausea/Vomiting., Disp: 20 Tab, Rfl: 3  •  sodium bicarbonate 325 MG Tab, Take 650 mg by mouth 2 Times a Day., Disp: , Rfl:   •  albuterol 108 (90 Base) MCG/ACT Aero Soln inhalation aerosol, Inhale 2 Puffs by mouth every 6 hours as needed for Shortness of Breath., Disp: , Rfl:   •  Melatonin 5 MG Tab, Take 5 mg by mouth every morning., Disp: , Rfl:   •  furosemide (LASIX) 80 MG Tab, Take 80 mg by mouth every day., Disp: , Rfl:   •  methocarbamol (ROBAXIN) 750 MG Tab, Take 750 mg by mouth 3 times a day., Disp: , Rfl:   •  LYRICA 300 MG capsule, Take 300 mg by mouth 2 times a day., Disp: , Rfl:   •  aspirin EC (ECOTRIN) 81 MG Tablet Delayed Response, Take 1 Tab by mouth every day., Disp: 30 Tab, Rfl: 6    ALLERGIES  Allergies   Allergen Reactions   • Cefdinir Shortness of Breath and Itching     Tolerated 1/18/17  Tolerates ceftriaxone    • Depakote [Divalproex Sodium] Unspecified     Muscle spasms/muscle pain and weakness     • Doxycycline Anaphylaxis and Vomiting     RXN=unknown   • Amitriptyline Unspecified     Headaches     • Aripiprazole [Abilify] Unspecified     Headaches/muscle twitching     • Clindamycin Nausea     Even with food     • Ees [Erythromycin] Vomiting and Nausea   • Flagyl [Metronidazole Hcl] Unspecified     \"eye problems\"     • Flomax [Tamsulosin Hydrochloride] Swelling   • Levaquin Unspecified     Severe muscle cramps in legs  RXN=unknown   • Metformin Unspecified     Increased lactic acid      • Tape Rash     Tears skin off  coban with Tegaderm tape ok intermittently  RXN=ongoing   • Vancomycin Itching     Pt becomes flushed in face and " "chest.   RXN=7/10/16   • Wound Dressing Adhesive Hives     By pt report   • Cephalexin [Keflex] Rash     Pt states she gets a rash with this medication  Tolerates ceftriaxone   • Divalproex Sodium [Valproic Acid] Rash     .   • Erythromycin Rash     .   • Levofloxacin Unspecified     Leg muscle cramps   • Metronidazole Rash     .   • Tamsulosin Hcl        PHYSICAL EXAM  VITAL SIGNS: /91   Pulse 75   Temp 36.6 °C (97.9 °F) (Temporal)   Resp 16   Ht 1.651 m (5' 5\")   Wt (!) 128.4 kg (283 lb)   SpO2 98%   BMI 47.09 kg/m²     Constitutional: Alert in no apparent distress.  HENT: No signs of trauma, Bilateral external ears normal, Nose normal. Uvula midline.   Eyes: Pupils are equal and reactive, Conjunctiva normal, Non-icteric.   Neck: Normal range of motion, No tenderness, Supple, No stridor.   Lymphatic: No lymphadenopathy noted.   Cardiovascular: Regular rate and rhythm, no murmurs.   Thorax & Lungs: Normal breath sounds, No respiratory distress, No wheezing, No chest tenderness.   Abdomen:  Soft, No tenderness, No peritoneal signs, No masses, No pulsatile masses.   Skin: Warm, Dry, R> L heel erythema. Broken skin and onchomycosis of b/l feet.   Back: No bony tenderness, No CVA tenderness.   Extremities: Intact distal pulses, No edema, No tenderness, No cyanosis.  Musculoskeletal: Good range of motion in all major joints. No tenderness to palpation or major deformities noted.   Neurologic: Alert , Normal motor function, Normal sensory function, No focal deficits noted.   Psychiatric: Affect normal, Judgment normal, Mood normal.     DIAGNOSTIC STUDIES / PROCEDURES    LABS  Labs Reviewed   COMP METABOLIC PANEL - Abnormal; Notable for the following components:       Result Value    Co2 19 (*)     Anion Gap 13.0 (*)     Glucose 109 (*)     All other components within normal limits   CRP QUANTITIVE (NON-CARDIAC) - Abnormal; Notable for the following components:    Stat C-Reactive Protein 1.66 (*)     All " "other components within normal limits   CBC WITH DIFFERENTIAL   WESTERGREN SED RATE   BLOOD CULTURE    Narrative:     Per Hospital Policy: Only change Specimen Src: to \"Line\" if  specified by physician order.   BLOOD CULTURE    Narrative:     Per Hospital Policy: Only change Specimen Src: to \"Line\" if  specified by physician order.   ESTIMATED GFR      All labs reviewed by me.      RADIOLOGY  DX-FOOT-COMPLETE 3+ RIGHT   Final Result      1.  No radiographic evidence for osteomyelitis.      2.  Small calcaneal bone spurs.      DX-FOOT-COMPLETE 3+ LEFT   Final Result      1.  No radiographic evidence of osteomyelitis.      2.  Small calcaneal bone spur.        The radiologist's interpretation of all radiological studies have been reviewed by me.    COURSE & MEDICAL DECISION MAKING  Nursing notes, VS, PMSFHx reviewed in chart.    29 y.o. female p/w chief complaint of bilateral lower foot pain.    2:55 PM Patient seen and examined at bedside. Patient reated with Tylenol 650 mg, Roxicodone 5 mg, and Vancomycin.     4:42 PM Hospitalist called    4:59 PM I discussed the patient's case and the above findings with Dr. Harper (Hospitalist) who agrees to admit the patient into his care.    The differential diagnoses include but are not limited to:     Hx and PE c/w cellulitis  Pt w/ no crepitus or tissue necrosis to suggest concern for Necrotizing fascitis at this time  Ordered DX feet bilat and blood work.  FROM of ankle w/o pain. Doubt septic joint at this time.   Isolated to foot at this time.   Patient with home physical therapy and she states that pain is worsened to the point where she is unable to continue to ambulate at home.   Discussed antibiotic regiment with pharmacy who agrees with IV Vanc.     DISPOSITION:  Patient will be admitted to Dr. Harper in guarded condition.    FINAL IMPRESSION  1. Cellulitis of right upper extremity          IChase (Scribe), am scribing for, and in the presence of, Jimmy LAWRENCE " RUFUS Abbott.    Electronically signed by: Chase Bernal (Scribe), 11/6/2019    IJimmy M.D. personally performed the services described in this documentation, as scribed by Chase Bernal in my presence, and it is both accurate and complete.    C.    The note accurately reflects work and decisions made by me.  Jimmy Abbott  11/6/2019  5:35 PM

## 2019-11-07 ENCOUNTER — APPOINTMENT (OUTPATIENT)
Dept: RADIOLOGY | Facility: MEDICAL CENTER | Age: 29
DRG: 565 | End: 2019-11-07
Attending: INTERNAL MEDICINE
Payer: MEDICARE

## 2019-11-07 PROBLEM — B35.1 ONYCHOMYCOSIS: Status: RESOLVED | Noted: 2019-11-06 | Resolved: 2019-11-07

## 2019-11-07 PROBLEM — E11.65 UNCONTROLLED TYPE 2 DIABETES MELLITUS WITH HYPERGLYCEMIA (HCC): Status: RESOLVED | Noted: 2019-08-29 | Resolved: 2019-11-07

## 2019-11-07 PROBLEM — E87.6 HYPOKALEMIA: Status: RESOLVED | Noted: 2019-09-29 | Resolved: 2019-11-07

## 2019-11-07 PROBLEM — I47.11 INAPPROPRIATE SINUS TACHYCARDIA (HCC): Status: RESOLVED | Noted: 2019-04-10 | Resolved: 2019-11-07

## 2019-11-07 LAB
ANION GAP SERPL CALC-SCNC: 11 MMOL/L (ref 0–11.9)
APPEARANCE UR: ABNORMAL
BACTERIA #/AREA URNS HPF: NEGATIVE /HPF
BILIRUB UR QL STRIP.AUTO: NEGATIVE
BUN SERPL-MCNC: 7 MG/DL (ref 8–22)
CALCIUM SERPL-MCNC: 8.8 MG/DL (ref 8.5–10.5)
CAOX CRY #/AREA URNS HPF: ABNORMAL /HPF
CHLORIDE SERPL-SCNC: 110 MMOL/L (ref 96–112)
CO2 SERPL-SCNC: 19 MMOL/L (ref 20–33)
COLOR UR: YELLOW
CREAT SERPL-MCNC: 0.62 MG/DL (ref 0.5–1.4)
EPI CELLS #/AREA URNS HPF: ABNORMAL /HPF
ERYTHROCYTE [DISTWIDTH] IN BLOOD BY AUTOMATED COUNT: 46 FL (ref 35.9–50)
GLUCOSE BLD-MCNC: 105 MG/DL (ref 65–99)
GLUCOSE BLD-MCNC: 122 MG/DL (ref 65–99)
GLUCOSE BLD-MCNC: 133 MG/DL (ref 65–99)
GLUCOSE BLD-MCNC: 140 MG/DL (ref 65–99)
GLUCOSE SERPL-MCNC: 129 MG/DL (ref 65–99)
GLUCOSE UR STRIP.AUTO-MCNC: NEGATIVE MG/DL
HCT VFR BLD AUTO: 40.1 % (ref 37–47)
HGB BLD-MCNC: 12.9 G/DL (ref 12–16)
HYALINE CASTS #/AREA URNS LPF: ABNORMAL /LPF
KETONES UR STRIP.AUTO-MCNC: NEGATIVE MG/DL
LEUKOCYTE ESTERASE UR QL STRIP.AUTO: ABNORMAL
MCH RBC QN AUTO: 31.1 PG (ref 27–33)
MCHC RBC AUTO-ENTMCNC: 32.2 G/DL (ref 33.6–35)
MCV RBC AUTO: 96.6 FL (ref 81.4–97.8)
MICRO URNS: ABNORMAL
NITRITE UR QL STRIP.AUTO: NEGATIVE
PH UR STRIP.AUTO: 5 [PH] (ref 5–8)
PLATELET # BLD AUTO: 179 K/UL (ref 164–446)
PMV BLD AUTO: 11.9 FL (ref 9–12.9)
POTASSIUM SERPL-SCNC: 3.8 MMOL/L (ref 3.6–5.5)
PROT UR QL STRIP: NEGATIVE MG/DL
RBC # BLD AUTO: 4.15 M/UL (ref 4.2–5.4)
RBC # URNS HPF: ABNORMAL /HPF
RBC UR QL AUTO: ABNORMAL
SODIUM SERPL-SCNC: 140 MMOL/L (ref 135–145)
SP GR UR STRIP.AUTO: 1.03
UROBILINOGEN UR STRIP.AUTO-MCNC: 0.2 MG/DL
WBC # BLD AUTO: 6.6 K/UL (ref 4.8–10.8)
WBC #/AREA URNS HPF: ABNORMAL /HPF

## 2019-11-07 PROCEDURE — 81001 URINALYSIS AUTO W/SCOPE: CPT

## 2019-11-07 PROCEDURE — 700102 HCHG RX REV CODE 250 W/ 637 OVERRIDE(OP): Performed by: INTERNAL MEDICINE

## 2019-11-07 PROCEDURE — A9270 NON-COVERED ITEM OR SERVICE: HCPCS | Performed by: INTERNAL MEDICINE

## 2019-11-07 PROCEDURE — 73700 CT LOWER EXTREMITY W/O DYE: CPT | Mod: RT

## 2019-11-07 PROCEDURE — 94640 AIRWAY INHALATION TREATMENT: CPT

## 2019-11-07 PROCEDURE — 770006 HCHG ROOM/CARE - MED/SURG/GYN SEMI*

## 2019-11-07 PROCEDURE — 36415 COLL VENOUS BLD VENIPUNCTURE: CPT

## 2019-11-07 PROCEDURE — 85027 COMPLETE CBC AUTOMATED: CPT

## 2019-11-07 PROCEDURE — 94760 N-INVAS EAR/PLS OXIMETRY 1: CPT

## 2019-11-07 PROCEDURE — 99233 SBSQ HOSP IP/OBS HIGH 50: CPT | Performed by: INTERNAL MEDICINE

## 2019-11-07 PROCEDURE — 700111 HCHG RX REV CODE 636 W/ 250 OVERRIDE (IP): Performed by: INTERNAL MEDICINE

## 2019-11-07 PROCEDURE — 700105 HCHG RX REV CODE 258: Performed by: INTERNAL MEDICINE

## 2019-11-07 PROCEDURE — 700101 HCHG RX REV CODE 250: Performed by: INTERNAL MEDICINE

## 2019-11-07 PROCEDURE — 73700 CT LOWER EXTREMITY W/O DYE: CPT | Mod: LT

## 2019-11-07 PROCEDURE — 99223 1ST HOSP IP/OBS HIGH 75: CPT | Performed by: INTERNAL MEDICINE

## 2019-11-07 PROCEDURE — 80048 BASIC METABOLIC PNL TOTAL CA: CPT

## 2019-11-07 PROCEDURE — 82962 GLUCOSE BLOOD TEST: CPT

## 2019-11-07 RX ORDER — FLUOXETINE HYDROCHLORIDE 20 MG/1
40 CAPSULE ORAL DAILY
Status: DISCONTINUED | OUTPATIENT
Start: 2019-11-07 | End: 2019-11-12 | Stop reason: HOSPADM

## 2019-11-07 RX ORDER — BUSPIRONE HYDROCHLORIDE 10 MG/1
10 TABLET ORAL 2 TIMES DAILY
Status: DISCONTINUED | OUTPATIENT
Start: 2019-11-07 | End: 2019-11-12 | Stop reason: HOSPADM

## 2019-11-07 RX ADMIN — OXYCODONE HYDROCHLORIDE 5 MG: 5 TABLET ORAL at 20:47

## 2019-11-07 RX ADMIN — IPRATROPIUM BROMIDE AND ALBUTEROL SULFATE 3 ML: .5; 3 SOLUTION RESPIRATORY (INHALATION) at 21:20

## 2019-11-07 RX ADMIN — SENNOSIDES AND DOCUSATE SODIUM 2 TABLET: 8.6; 5 TABLET ORAL at 17:42

## 2019-11-07 RX ADMIN — MYCOPHENOLATE MOFETIL 1000 MG: 250 CAPSULE ORAL at 04:47

## 2019-11-07 RX ADMIN — VANCOMYCIN HYDROCHLORIDE 2000 MG: 500 INJECTION, POWDER, LYOPHILIZED, FOR SOLUTION INTRAVENOUS at 18:07

## 2019-11-07 RX ADMIN — METHOCARBAMOL TABLETS 750 MG: 750 TABLET, COATED ORAL at 04:48

## 2019-11-07 RX ADMIN — GABAPENTIN 300 MG: 300 CAPSULE ORAL at 08:16

## 2019-11-07 RX ADMIN — ONDANSETRON 4 MG: 2 INJECTION INTRAMUSCULAR; INTRAVENOUS at 00:01

## 2019-11-07 RX ADMIN — METHOCARBAMOL TABLETS 750 MG: 750 TABLET, COATED ORAL at 13:01

## 2019-11-07 RX ADMIN — OXYCODONE HYDROCHLORIDE 5 MG: 5 TABLET ORAL at 14:48

## 2019-11-07 RX ADMIN — POTASSIUM CHLORIDE 20 MEQ: 20 TABLET, EXTENDED RELEASE ORAL at 04:48

## 2019-11-07 RX ADMIN — ALBUTEROL SULFATE 2 PUFF: 90 AEROSOL, METERED RESPIRATORY (INHALATION) at 16:08

## 2019-11-07 RX ADMIN — SODIUM BICARBONATE 650 MG: 650 TABLET ORAL at 04:48

## 2019-11-07 RX ADMIN — MONTELUKAST 10 MG: 10 TABLET, FILM COATED ORAL at 04:47

## 2019-11-07 RX ADMIN — TRAZODONE HYDROCHLORIDE 100 MG: 100 TABLET ORAL at 17:43

## 2019-11-07 RX ADMIN — ZIPRASIDONE HYDROCHLORIDE 80 MG: 80 CAPSULE ORAL at 17:46

## 2019-11-07 RX ADMIN — SODIUM BICARBONATE 650 MG: 650 TABLET ORAL at 17:47

## 2019-11-07 RX ADMIN — PREGABALIN 300 MG: 150 CAPSULE ORAL at 04:48

## 2019-11-07 RX ADMIN — CEFEPIME 2 G: 2 INJECTION, POWDER, FOR SOLUTION INTRAVENOUS at 03:39

## 2019-11-07 RX ADMIN — GABAPENTIN 300 MG: 300 CAPSULE ORAL at 20:47

## 2019-11-07 RX ADMIN — METHOCARBAMOL TABLETS 750 MG: 750 TABLET, COATED ORAL at 19:27

## 2019-11-07 RX ADMIN — GABAPENTIN 300 MG: 300 CAPSULE ORAL at 17:42

## 2019-11-07 RX ADMIN — ONDANSETRON 4 MG: 2 INJECTION INTRAMUSCULAR; INTRAVENOUS at 08:12

## 2019-11-07 RX ADMIN — FUROSEMIDE 80 MG: 40 TABLET ORAL at 04:47

## 2019-11-07 RX ADMIN — ENOXAPARIN SODIUM 40 MG: 100 INJECTION SUBCUTANEOUS at 04:53

## 2019-11-07 RX ADMIN — MYCOPHENOLATE MOFETIL 1000 MG: 250 CAPSULE ORAL at 17:43

## 2019-11-07 RX ADMIN — VANCOMYCIN HYDROCHLORIDE 125 MG: 10 INJECTION, POWDER, LYOPHILIZED, FOR SOLUTION INTRAVENOUS at 17:41

## 2019-11-07 RX ADMIN — ALBUTEROL SULFATE 2 PUFF: 90 AEROSOL, METERED RESPIRATORY (INHALATION) at 02:10

## 2019-11-07 RX ADMIN — VANCOMYCIN HYDROCHLORIDE 125 MG: 10 INJECTION, POWDER, LYOPHILIZED, FOR SOLUTION INTRAVENOUS at 04:53

## 2019-11-07 RX ADMIN — FLUOXETINE HYDROCHLORIDE 40 MG: 20 CAPSULE ORAL at 13:15

## 2019-11-07 RX ADMIN — PREGABALIN 300 MG: 150 CAPSULE ORAL at 17:43

## 2019-11-07 RX ADMIN — BUSPIRONE HYDROCHLORIDE 10 MG: 10 TABLET ORAL at 04:48

## 2019-11-07 RX ADMIN — ZIPRASIDONE HYDROCHLORIDE 80 MG: 80 CAPSULE ORAL at 04:48

## 2019-11-07 RX ADMIN — FOLIC ACID 1 MG: 1 TABLET ORAL at 04:47

## 2019-11-07 RX ADMIN — TIOTROPIUM BROMIDE 1 CAPSULE: 18 CAPSULE ORAL; RESPIRATORY (INHALATION) at 04:56

## 2019-11-07 RX ADMIN — LEVOTHYROXINE SODIUM 75 MCG: 75 TABLET ORAL at 04:47

## 2019-11-07 RX ADMIN — OXYCODONE HYDROCHLORIDE 10 MG: 10 TABLET ORAL at 06:35

## 2019-11-07 RX ADMIN — ASPIRIN 81 MG: 81 TABLET, COATED ORAL at 04:47

## 2019-11-07 RX ADMIN — OXYCODONE HYDROCHLORIDE 10 MG: 10 TABLET ORAL at 00:14

## 2019-11-07 RX ADMIN — PREDNISONE 10 MG: 10 TABLET ORAL at 04:48

## 2019-11-07 RX ADMIN — SODIUM CHLORIDE: 9 INJECTION, SOLUTION INTRAVENOUS at 00:06

## 2019-11-07 RX ADMIN — POTASSIUM CHLORIDE 20 MEQ: 20 TABLET, EXTENDED RELEASE ORAL at 17:43

## 2019-11-07 RX ADMIN — ACETAMINOPHEN 650 MG: 325 TABLET, FILM COATED ORAL at 11:41

## 2019-11-07 RX ADMIN — BUSPIRONE HYDROCHLORIDE 10 MG: 10 TABLET ORAL at 17:42

## 2019-11-07 RX ADMIN — SENNOSIDES AND DOCUSATE SODIUM 2 TABLET: 8.6; 5 TABLET ORAL at 04:48

## 2019-11-07 RX ADMIN — FLUCONAZOLE 150 MG: 100 TABLET ORAL at 04:47

## 2019-11-07 RX ADMIN — GABAPENTIN 300 MG: 300 CAPSULE ORAL at 13:01

## 2019-11-07 RX ADMIN — INSULIN LISPRO 9 UNITS: 100 INJECTION, SOLUTION INTRAVENOUS; SUBCUTANEOUS at 09:12

## 2019-11-07 ASSESSMENT — ENCOUNTER SYMPTOMS
CHILLS: 0
HEADACHES: 1
SHORTNESS OF BREATH: 0
NAUSEA: 1
DIARRHEA: 0
FEVER: 0
BLURRED VISION: 0
PALPITATIONS: 0
COUGH: 0
DIZZINESS: 0
MYALGIAS: 1
HEARTBURN: 0
VOMITING: 0
NERVOUS/ANXIOUS: 1
ABDOMINAL PAIN: 0

## 2019-11-07 NOTE — ASSESSMENT & PLAN NOTE
-No on therapeutic AC at home.   -Can be induced by corlanor. Will need outpatient follow up for this.   -Continue home furosemide with potassium supplementation.   -Echo 11/8/19: EF 55%, normal echo.

## 2019-11-07 NOTE — PROGRESS NOTES
Received bedside report from Night RN. Pt awake and alert. Bed alarm in use.Complains of leg pain at this time. Will administer pain medications as ordered. Placed pur wick urine management divide. Will administer inslin as ordered. Discussed plan of care

## 2019-11-07 NOTE — PROGRESS NOTES
"Pharmacy Kinetics 29 y.o. female on vancomycin day # 1 2019    Received Vancomycin 3,000 mg IV loading dose at 17:03 PM  Provider specified end date:     Indication for Treatment: cellulitis      Pertinent history per medical record: Admitted on 2019 for SSTI.    Kristin Balderrama is a 29 y.o. female who presented to the ER with bilateral foot/heel pain, which began this morning. She was seen in the ER on 10/28 for similar complaints and discharged on Augmentin and PO Vancomycin for potential C. Diff diarrhea (C. Diff PCR from 10/30 was negative for C. Diff infection). Her R and L foot x-rays taken today showed no radiographic evidence for osteomyelitis. On physical exam, there may be some component of onychomycosis.  Of note, the patient is on mycophenolate and and oral prednisone daily for optic neuritis.      Other antibiotics/antifungals:   · Cefepime 2g IV Q12h   · Vancomycin 125 mg PO BID  · Fluconazole 150 mg PO daily     Allergies: Cefdinir; Depakote [divalproex sodium]; Doxycycline; Amitriptyline; Aripiprazole [abilify]; Clindamycin; Ees [erythromycin]; Flagyl [metronidazole hcl]; Flomax [tamsulosin hydrochloride]; Levaquin; Metformin; Tape; Vancomycin; Wound dressing adhesive; Cephalexin [keflex]; Divalproex sodium [valproic acid]; Erythromycin; Levofloxacin; Metronidazole; and Tamsulosin hcl   List concerns for renal function: BMI 47.09 kg/m²    Pertinent cultures to date:   19: Blood culture x 2 - in process   19: Blood culture x 2 - in process     MRSA nares swab if pneumonia is a concern (ordered/positive/negative/n-a): n-a    Recent Labs     19  1530   WBC 6.3   NEUTSPOLYS 64.40     Recent Labs     19  1530   BUN 8   CREATININE 0.64   ALBUMIN 4.3     /67   Pulse 78   Temp 36.6 °C (97.9 °F) (Temporal)   Resp 16   Ht 1.651 m (5' 5\")   Wt (!) 128.4 kg (283 lb)   SpO2 98%  Temp (24hrs), Av.6 °C (97.9 °F), Min:36.6 °C (97.9 °F), Max:36.6 °C (97.9 " °F)    A/P   1. Vancomycin dose change: 2,000 mg IV Q12h (05:00, 17:00)  2. Next vancomycin level: Trough in 1-2 days (not yet ordered)  3. Goal trough: 12-16 mcg/mL  4. Comments: Vancomycin ~16mg/kg IV Q12h maintenance dose ordered based on historical vancomycin regimens. Concerns for renal accumulation of vancomycin listed above. A trough will be obtained at steady state. Pharmacy will continue to follow and recommend de-escalation of antibiotics as appropriate.     Melissa Dudley, PharmD, BCPS

## 2019-11-07 NOTE — PROGRESS NOTES
· 2 RN skin check complete with SONYA Ricardo.   · Devices in place PIV, BP cuff.  · Skin assessed under devices yes.  · Confirmed pressure ulcers found on n/a.  · New potential pressure ulcers noted on n/a. Wound consult placed? no. Photo uploaded? no.   · Bilateral redness noted to patients feet - cellulitis  · Redness but blanching to patient's sacrum  · Callous to bilateral heels  · 3 abrasions noted to patient's abdomen - patient states they are from cat scratches  · 2 abrasions noted to patient's left breast - patient states they are from cat scratches  · The following interventions are in place  Pressure redistribution mattress, pillows for repositioning, patient able to turn independently in bed.

## 2019-11-07 NOTE — PROGRESS NOTES
Hospital Medicine Daily Progress Note    Date of Service  11/7/2019    Chief Complaint  29 y.o. female admitted 11/6/2019 with foot pain     Hospital Course    29-year-old female with complicated history of borderline personality, schizophrenia, type 2 diabetes, morbid obesity,  IAST treated with corlanor, pervious ablation for sinus node modification TONYA, optic neuritis and immunosuppression therapy presented with bilateral foot pain.  She was just discharged a week ago concerning for cellulitis and C. difficile.  She was prescribed Augmentin and vancomycin oral.  Pain was getting worse.  She was describing burning sensation.  Significant pain during touch.  No purulent discharges.  She usually goes barefoot and she does not wear footwear.  She has history of MRSA from breast abscess.  No leukocytosis.  Afebrile. No redness on following morning. significant pain on light touch. CT scan for further eval to rule out fracture. She was placed on cefepime and vancomycin. She has h/o ioana syndrome, but she had been able to tolerate slow infusion. ID consulted        Interval Problem Update  No redness, very sleepy. Reports pain. Vitals stable    Consultants/Specialty  ID     Code Status  Full code    Disposition  Inpatient     Review of Systems  Review of Systems   Constitutional: Positive for malaise/fatigue. Negative for chills and fever.   Eyes: Negative for blurred vision.   Respiratory: Negative for cough and shortness of breath.    Cardiovascular: Negative for chest pain, palpitations and leg swelling.   Gastrointestinal: Positive for nausea. Negative for abdominal pain, diarrhea, heartburn and vomiting.   Genitourinary: Negative for urgency.   Musculoskeletal: Positive for joint pain and myalgias.   Neurological: Positive for headaches. Negative for dizziness.   Psychiatric/Behavioral: The patient is nervous/anxious.         Physical Exam  Temp:  [36.7 °C (98.1 °F)-37.2 °C (98.9 °F)] 36.9 °C (98.4 °F)  Pulse:   [73-99] 99  Resp:  [16-20] 16  BP: (100-145)/(49-83) 145/78  SpO2:  [95 %-98 %] 97 %    Physical Exam  Vitals signs and nursing note reviewed.   Constitutional:       General: She is not in acute distress.     Appearance: Normal appearance. She is obese. She is not diaphoretic.      Comments: sommonelent    HENT:      Head: Normocephalic.      Right Ear: External ear normal.      Left Ear: External ear normal.      Nose: No congestion.   Eyes:      Extraocular Movements: Extraocular movements intact.   Neck:      Musculoskeletal: Neck supple. No neck rigidity.   Cardiovascular:      Rate and Rhythm: Normal rate and regular rhythm.      Heart sounds: No murmur.   Pulmonary:      Effort: Pulmonary effort is normal. No respiratory distress.      Breath sounds: Normal breath sounds. No stridor. No wheezing or rhonchi.   Abdominal:      General: Abdomen is flat. There is no distension.      Palpations: Abdomen is soft. There is no mass.      Tenderness: There is no tenderness.      Hernia: No hernia is present.   Musculoskeletal:         General: Tenderness present. No swelling, deformity or signs of injury.      Comments: Painful when touching her heels    Skin:     Coloration: Skin is not jaundiced.   Neurological:      General: No focal deficit present.      Mental Status: She is alert and oriented to person, place, and time.   Psychiatric:         Mood and Affect: Mood normal.         Behavior: Behavior normal.         Fluids    Intake/Output Summary (Last 24 hours) at 11/7/2019 1606  Last data filed at 11/7/2019 1159  Gross per 24 hour   Intake 300 ml   Output 1700 ml   Net -1400 ml       Laboratory  Recent Labs     11/06/19  1530 11/07/19  0241   WBC 6.3 6.6   RBC 4.23 4.15*   HEMOGLOBIN 13.5 12.9   HEMATOCRIT 39.3 40.1   MCV 92.9 96.6   MCH 31.9 31.1   MCHC 34.4 32.2*   RDW 43.2 46.0   PLATELETCT 192 179   MPV 11.9 11.9     Recent Labs     11/06/19  1530 11/07/19  0241   SODIUM 141 140   POTASSIUM 3.9 3.8  "  CHLORIDE 109 110   CO2 19* 19*   GLUCOSE 109* 129*   BUN 8 7*   CREATININE 0.64 0.62   CALCIUM 9.2 8.8     Recent Labs     11/06/19  1530   INR 0.93               Imaging  CT-FOOT W/O PLUS RECONS LEFT   Final Result      1.  No acute fracture or dislocation.      2.  No soft tissue fluid collection or abscess is identified.      3.  No bone erosions are appreciated.      DX-FOOT-COMPLETE 3+ RIGHT   Final Result      1.  No radiographic evidence for osteomyelitis.      2.  Small calcaneal bone spurs.      DX-FOOT-COMPLETE 3+ LEFT   Final Result      1.  No radiographic evidence of osteomyelitis.      2.  Small calcaneal bone spur.      CT-FOOT W/O PLUS RECONS RIGHT    (Results Pending)        Assessment/Plan  * Bilateral heel cellulitis failed outpatient therapy  Assessment & Plan  At home she was on augmentin ,    continue Cefepime, vancomycin for now. I did discuss with Dr. 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 difficile prophylaxis was recommended.  Stop diflucan.very toxic   Does not feel air, necrotizing fascitis, cannot feel abscess   CT scan for further eval     History of atrial fibrillation and cardiomyopathy?  Assessment & Plan  Not on anticoagulation  On aspirin and ivadarbine  On LAsix with K supplementation  COntinue these medications   IAST treated with corlanor, pervious ablation for sinus node modification  Continue corlanor     Immunocompromised (HCC)  Assessment & Plan  On Cellcept and prednisone for optic neuritis and \"poor immune system\".  Avoid probiotics.      Peripheral neuropathy and chornic pain syndrome (CMS-HCC)- (present on admission)  Assessment & Plan  On Lyrica and gabapentin  Continue RObaxin    Other specified hypothyroidism  Assessment & Plan  Continue Synthroid.    Diabetes type 2, controlled (HCC)  Assessment & Plan  Continue just ISSS   Resume trulicity tomorrow if still in the hospital   accucheck   Hypoglycemia protocol   Last a1c " 6.0        VTE prophylaxis: lovenox

## 2019-11-07 NOTE — CARE PLAN
Problem: Safety  Goal: Will remain free from injury  Outcome: PROGRESSING AS EXPECTED   Safety precautions in use including use of call bell for assistance, bed in lowest position, bed/chair alarm utilized and use of treaded socks. Objects in room moved to allow access to bathroom     Problem: Infection  Goal: Will remain free from infection  Outcome: PROGRESSING AS EXPECTED     Problem: Skin Integrity  Goal: Risk for impaired skin integrity will decrease  Outcome: PROGRESSING AS EXPECTED   Pressure relieving mattress in use, turned q 2 hrs, barrier cream in use.

## 2019-11-07 NOTE — ASSESSMENT & PLAN NOTE
-Review of imaging revealed bilateral calcaneal bone spurs only. No abscess, fracture, or osteomyelitis.   -Started scheduled NSAIDS. Gabapentin increased yesterday by previous MD. Use narcotics only for breakthrough pain. Avoid IV narcs.   -Will need continued PT to help with relief.

## 2019-11-07 NOTE — ASSESSMENT & PLAN NOTE
-Continue home lyrica & gabapentin. Gabapentin dose increased by previous MD.   -Continue home robaxin.

## 2019-11-07 NOTE — ED NOTES
Pt had an episode of urinary incontinence, cleaned up and repositioned for comfort. Pt being transferred to floor.

## 2019-11-07 NOTE — DIETARY
NUTRITION SERVICES:  Pt with BMI 47.09. Morbid obesity. Weight loss counseling not appropriate in acute care setting.     RECOMMENDATION: Referral to outpatient nutrition services for weight management after D/C.

## 2019-11-07 NOTE — CARE PLAN
Problem: Communication  Goal: The ability to communicate needs accurately and effectively will improve  Outcome: PROGRESSING AS EXPECTED  Patient oriented to unit and new room. Patient provided welcome packet, and care pack. Pt appropriately demonstrates how to utilize the call light, pt able to make needs known.      Problem: Skin Integrity  Goal: Risk for impaired skin integrity will decrease  Outcome: PROGRESSING AS EXPECTED  Patient states that she is unable to turn in bed. Q2 turns implemented, 2 RN skin assessment completed, pressure redistribution mattress, pillows for positioning, pillows used to float heels, silicone oxygen tubing in use.

## 2019-11-07 NOTE — ASSESSMENT & PLAN NOTE
-Maintained on trulicity at home.   -Has not required insulin this admission. SSI & accuchecks therefore discontinued today.   -A1C 2 months ago was 6%.  -Continue treatment for neuropathy.

## 2019-11-07 NOTE — H&P
"Hospital Medicine History & Physical Note    Date of Service  11/6/2019    Primary Care Physician  Torres Brody M.D.    Consultants      Code Status  full    Chief Complaint  Foot Pain        History of Presenting Illness  29 y.o. 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 On Lasix, aspirin and ivadrabine. July 2019 echo: Normal left ventricular systolic function.  Left ventricular ejection fraction is visually estimated to be 65%.  Normal left ventricular wall thickness.  Normal left atrial size.  Trace mitral regurgitation.  Structurally normal aortic valve without significant stenosis or   regurgitation.  Estimated right ventricular systolic pressure  is 30 mmHg.  Trace tricuspid regurgitation.  Normal right ventricular size.  Also chronic pain and neuropathy  Presented with Foot Pain  Was at the ED last Thursday for foot pain. Diagnosed with cellulitis. Discharged on Augmentin with oral vancomycin for C diff prophylaxis because she complained of diarrhea.  Her foot pain and cellulitis felt worse so she returns. She also complained of fevers, chills and mild nonproductive cough.    At the ED, she is afebrile and normotensive.  Foot Xrays showed diffuse edema but no bone or joint involvement or mention of gas on the tissues.  No leukocytosis.   When I saw her at ED, braden cute distress. Mild erythema bilateral heels, swelling and tenderness noted.    Review of Systems  Review of Systems   Constitutional: Positive for chills, fever and malaise/fatigue.   HENT: Negative for congestion, hearing loss and nosebleeds.    Eyes: Negative for pain and redness.   Respiratory: Negative for cough, hemoptysis, shortness of breath and wheezing.    Cardiovascular: Negative for chest pain and palpitations. "   Gastrointestinal: Negative for abdominal pain, blood in stool, constipation, diarrhea, nausea and vomiting.   Genitourinary: Negative for dysuria, frequency and hematuria.   Musculoskeletal: Positive for myalgias. Negative for falls and joint pain.   Skin: Positive for rash.   Neurological: Negative for dizziness, tremors, focal weakness, seizures, loss of consciousness, weakness and headaches.   Psychiatric/Behavioral: The patient is not nervous/anxious and does not have insomnia.    All other systems reviewed and are negative.      Past Medical History   has a past medical history of Abdominal pain, Anginal syndrome, Apnea, sleep, Arrhythmia, Arthritis, ASTHMA, Atrial fibrillation ([facility]ly [facility]), Back pain, Borderline personality disorder ([facility]), Breath shortness, Bronchitis, Cardiac arrhythmia, Chickenpox, Chronic UTI (9/18/2010), Cough, Daytime sleepiness, Depression, Diabetes ([facility]), Diarrhea, Disorder of thyroid, Fall, Fatigue, Frequent headaches, Gasping for breath, Gynecological disorder, Headache(784.0), Heart burn, History of falling, Hypertension, Indigestion, Migraine, 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[facility]), 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    Surgical History   has a past surgical history that includes neuro dest facet l/s w/ig sngl (4/21/2015); recovery (1/27/2016); dlemar by laparoscopy (8/29/2010); lumbar fusion anterior (8/21/2012); other cardiac surgery (1/2016); tonsillectomy; bowel stimulator insertion (7/13/2016); gastroscopy with balloon dilatation (N/A, 1/4/2017); muscle biopsy (Right, 1/26/2017); other abdominal surgery; and laminotomy.     Family History  family history  "includes Genitourinary () Problems in her sister; Heart Disease in her maternal grandmother and mother; Hypertension in her maternal grandmother, maternal uncle, and mother; No Known Problems in her sister; Other in her mother and sister; Sleep Apnea in her mother.     Social History   reports that she has never smoked. She has never used smokeless tobacco. She reports current drug use. Frequency: 7.00 times per week. Drugs: Marijuana and Oral. She reports that she does not drink alcohol.    Allergies  Allergies   Allergen Reactions   • Cefdinir Shortness of Breath and Itching     Tolerated 1/18/17  Tolerates ceftriaxone    • Depakote [Divalproex Sodium] Unspecified     Muscle spasms/muscle pain and weakness     • Doxycycline Anaphylaxis and Vomiting     RXN=unknown   • Amitriptyline Unspecified     Headaches     • Aripiprazole [Abilify] Unspecified     Headaches/muscle twitching     • Clindamycin Nausea     Even with food     • Ees [Erythromycin] Vomiting and Nausea   • Flagyl [Metronidazole Hcl] Unspecified     \"eye problems\"     • Flomax [Tamsulosin Hydrochloride] Swelling   • Levaquin Unspecified     Severe muscle cramps in legs  RXN=unknown   • Metformin Unspecified     Increased lactic acid      • Tape Rash     Tears skin off  coban with Tegaderm tape ok intermittently  RXN=ongoing   • Vancomycin Itching     Pt becomes flushed in face and chest.   RXN=7/10/16   • Wound Dressing Adhesive Hives     By pt report   • Cephalexin [Keflex] Rash     Pt states she gets a rash with this medication  Tolerates ceftriaxone   • Divalproex Sodium [Valproic Acid] Rash     .   • Erythromycin Rash     .   • Levofloxacin Unspecified     Leg muscle cramps   • Metronidazole Rash     .   • Tamsulosin Hcl        Medications  Prior to Admission Medications   Prescriptions Last Dose Informant Patient Reported? Taking?   Diclofenac Sodium (VOLTAREN) 1 % Gel   Yes No   Sig: Apply 1 Application to skin as directed 4 times a day as " needed (on wrists, back, ankles, and feet).   Dulaglutide (TRULICITY) 0.75 MG/0.5ML Solution Pen-injector  Patient Yes No   Sig: Inject 1.5 mg as instructed every Friday.   Folic Acid 0.8 MG Cap  Patient Yes No   Sig: Take 0.8 mg by mouth every day.   Ivabradine HCl (CORLANOR) 7.5 MG Tab  Patient Yes No   Sig: Take 7.5 mg by mouth 2 Times a Day.   LYRICA 300 MG capsule  Patient Yes No   Sig: Take 300 mg by mouth 2 times a day.   Melatonin 5 MG Tab  Patient Yes No   Sig: Take 5 mg by mouth every morning.   albuterol 108 (90 Base) MCG/ACT Aero Soln inhalation aerosol  Patient Yes No   Sig: Inhale 2 Puffs by mouth every 6 hours as needed for Shortness of Breath.   aspirin EC (ECOTRIN) 81 MG Tablet Delayed Response  Patient No No   Sig: Take 1 Tab by mouth every day.   busPIRone (BUSPAR) 10 MG Tab tablet   No No   Sig: Take 1 Tab by mouth 2 times a day.   diphenhydrAMINE-APAP, sleep, (TYLENOL PM EXTRA STRENGTH)  MG Tab   Yes No   Sig: Take 2 Tabs by mouth every morning.   etonogestrel (NEXPLANON) 68 MG Implant implant  Patient Yes No   Sig: Inject 1 Each as instructed Once.   fluoxetine (PROZAC) 40 MG capsule   No No   Sig: Take 1 Cap by mouth every day.   furosemide (LASIX) 80 MG Tab  Patient Yes No   Sig: Take 80 mg by mouth every day.   gabapentin (NEURONTIN) 300 MG Cap  Patient Yes No   Sig: Take 300 mg by mouth 4 times a day.   ipratropium-albuterol (DUONEB) 0.5-2.5 (3) MG/3ML nebulizer solution  Patient No No   Sig: 3 mL by Nebulization route every 6 hours as needed for Shortness of Breath.   lactulose 20 GM/30ML Solution   Yes No   Sig: Take 15 mL by mouth 2 times a day as needed. Indications: Chronic Constipation   levothyroxine (SYNTHROID) 75 MCG Tab  Patient No No   Sig: Take 1 Tab by mouth Every morning on an empty stomach.   methocarbamol (ROBAXIN) 750 MG Tab  Patient Yes No   Sig: Take 750 mg by mouth 3 times a day.   montelukast (SINGULAIR) 10 MG Tab   No No   Sig: Take 1 Tab by mouth every day.    mycophenolate (CELLCEPT) 500 MG tablet  Patient Yes No   Sig: Take 1,000 mg by mouth 2 times a day.   ondansetron (ZOFRAN) 4 MG Tab tablet  Patient No No   Sig: Take 1 Tab by mouth every four hours as needed for Nausea/Vomiting.   potassium chloride SA (KDUR) 20 MEQ Tab CR   No No   Sig: Take 1 Tab by mouth 2 times a day.   predniSONE (DELTASONE) 10 MG Tab   Yes No   Sig: Take 10 mg by mouth every morning.   ranitidine (ZANTAC) 300 MG tablet   Yes No   Sig: Take 300 mg by mouth every morning.   sodium bicarbonate 325 MG Tab  Patient Yes No   Sig: Take 650 mg by mouth 2 Times a Day.   tiotropium (SPIRIVA) 18 MCG Cap   No No   Sig: Inhale 1 Cap by mouth every day.   traZODone (DESYREL) 100 MG Tab   Yes No   Sig: Take 100 mg by mouth every morning.   ziprasidone (GEODON) 80 MG Cap   No No   Sig: Take 1 Cap by mouth 2 Times a Day.      Facility-Administered Medications: None       Physical Exam  Temp:  [36.6 °C (97.9 °F)] 36.6 °C (97.9 °F)  Pulse:  [75] 75  Resp:  [16] 16  BP: (147)/(91) 147/91  SpO2:  [98 %] 98 %    Physical Exam  Vitals signs and nursing note reviewed.   Constitutional:       General: She is not in acute distress.     Appearance: She is obese.   HENT:      Head: Normocephalic and atraumatic.      Right Ear: External ear normal.      Left Ear: External ear normal.      Nose: Nose normal.      Mouth/Throat:      Mouth: Mucous membranes are moist.   Eyes:      General: No scleral icterus.     Conjunctiva/sclera: Conjunctivae normal.   Neck:      Musculoskeletal: Normal range of motion and neck supple. No neck rigidity.   Cardiovascular:      Rate and Rhythm: Normal rate and regular rhythm.      Pulses: Normal pulses.      Heart sounds: Murmur present. No friction rub. No gallop.    Pulmonary:      Effort: Pulmonary effort is normal. No respiratory distress.      Breath sounds: Normal breath sounds. No stridor. No wheezing, rhonchi or rales.   Chest:      Chest wall: No tenderness.   Abdominal:       General: Abdomen is flat. Bowel sounds are normal. There is no distension.      Palpations: Abdomen is soft.      Tenderness: There is no tenderness. There is no guarding or rebound.   Musculoskeletal: Normal range of motion.         General: Swelling (bilateral heels) and tenderness (bilateral heels) present. No deformity.   Skin:     General: Skin is warm.      Coloration: Skin is not jaundiced.   Neurological:      General: No focal deficit present.      Mental Status: She is alert and oriented to person, place, and time. Mental status is at baseline.   Psychiatric:         Mood and Affect: Mood normal.         Behavior: Behavior normal.         Thought Content: Thought content normal.         Judgment: Judgment normal.         Laboratory:  Recent Labs     11/06/19  1530   WBC 6.3   RBC 4.23   HEMOGLOBIN 13.5   HEMATOCRIT 39.3   MCV 92.9   MCH 31.9   MCHC 34.4   RDW 43.2   PLATELETCT 192   MPV 11.9     Recent Labs     11/06/19  1530   SODIUM 141   POTASSIUM 3.9   CHLORIDE 109   CO2 19*   GLUCOSE 109*   BUN 8   CREATININE 0.64   CALCIUM 9.2     Recent Labs     11/06/19  1530   ALTSGPT 37   ASTSGOT 18   ALKPHOSPHAT 43   TBILIRUBIN 0.4   GLUCOSE 109*         No results for input(s): NTPROBNP in the last 72 hours.      No results for input(s): TROPONINT in the last 72 hours.    Urinalysis:    No results found     Imaging:  DX-FOOT-COMPLETE 3+ RIGHT   Final Result      1.  No radiographic evidence for osteomyelitis.      2.  Small calcaneal bone spurs.      DX-FOOT-COMPLETE 3+ LEFT   Final Result      1.  No radiographic evidence of osteomyelitis.      2.  Small calcaneal bone spur.            Assessment/Plan:  I anticipate this patient will require at least two midnights for appropriate medical management, necessitating inpatient admission.    * Bilateral heel failed outpatient therapy  Assessment & Plan  Per ID pharmacy and EDP, Cefepime, vancomycin recommended  Slow rate of vancomycin for reports of Dante  "syndrome  Oral vanco for C. difficile prophylaxis was recommended.  Port of entry could be onychomycosis so treating that.    History of atrial fibrillation and cardiomyopathy?  Assessment & Plan  Not on anticoagulation  On aspirin and ivadarbine  On LAsix with K supplementation  COntinue these medications    Immunocompromised (HCC)  Assessment & Plan  On Cellcept and prednisone for optic neuritis and \"poor immune system\".  Avoid probiotics.      Onychomycosis  Assessment & Plan  Fluconazole 150mg for 3 months recommended.    Peripheral neuropathy and chornic pain syndrome (CMS-HCC)- (present on admission)  Assessment & Plan  On Lyrica and gabapentin  Continue RObaxin    Other specified hypothyroidism  Assessment & Plan  Continue Synthroid.    Diabetes type 2, controlled (HCC)  Assessment & Plan  Ordered SS insulin      VTE prophylaxis: Lovenox SQ  Reviewed vitals, labs, imaging, staff notes.  Discussed assessment and plan with Kristin Balderrama   Discussed with ED physician and Pharmacy    "

## 2019-11-07 NOTE — ED NOTES
Med Rec Updated and Complete per Pt at bedside  Allergies Reviewed    Pt currently on a 10-day course of Augmentin 875-125mg twice daily Starting 10/31/19 Due to End 11/09/19. Pt also on a 7-day course of ORAL Vancomycin 50mg/ml (125mg every 6 hours) Starting 10/31/19 Ending 11/07/19.    Pt on a maintenance dose of Prednisone 10mg daily.

## 2019-11-07 NOTE — PROGRESS NOTES
Pt escorted to unit via gurney with transport. Slide board utilized to transfer patient to hospital bed. Pt oriented to unit, provided care pack and welcome packet. Plan of care discussed and all questions answered. Fall precautions in place - bed is in low and locked position, call light/belongings within reach, bed alarm on. Pt appropriately demonstrates how to utilize the call light.

## 2019-11-08 ENCOUNTER — APPOINTMENT (OUTPATIENT)
Dept: RADIOLOGY | Facility: MEDICAL CENTER | Age: 29
DRG: 565 | End: 2019-11-08
Attending: INTERNAL MEDICINE
Payer: MEDICARE

## 2019-11-08 ENCOUNTER — APPOINTMENT (OUTPATIENT)
Dept: CARDIOLOGY | Facility: MEDICAL CENTER | Age: 29
DRG: 565 | End: 2019-11-08
Attending: INTERNAL MEDICINE
Payer: MEDICARE

## 2019-11-08 ENCOUNTER — PATIENT OUTREACH (OUTPATIENT)
Dept: HEALTH INFORMATION MANAGEMENT | Facility: OTHER | Age: 29
End: 2019-11-08

## 2019-11-08 LAB
ALBUMIN SERPL BCP-MCNC: 4.4 G/DL (ref 3.2–4.9)
ALBUMIN/GLOB SERPL: 2.2 G/DL
ALP SERPL-CCNC: 42 U/L (ref 30–99)
ALT SERPL-CCNC: 40 U/L (ref 2–50)
ANION GAP SERPL CALC-SCNC: 13 MMOL/L (ref 0–11.9)
AST SERPL-CCNC: 21 U/L (ref 12–45)
BASOPHILS # BLD AUTO: 0.2 % (ref 0–1.8)
BASOPHILS # BLD: 0.01 K/UL (ref 0–0.12)
BILIRUB SERPL-MCNC: 0.4 MG/DL (ref 0.1–1.5)
BUN SERPL-MCNC: 8 MG/DL (ref 8–22)
CALCIUM SERPL-MCNC: 9.4 MG/DL (ref 8.5–10.5)
CHLORIDE SERPL-SCNC: 104 MMOL/L (ref 96–112)
CHOLEST SERPL-MCNC: 150 MG/DL (ref 100–199)
CO2 SERPL-SCNC: 22 MMOL/L (ref 20–33)
CREAT SERPL-MCNC: 0.88 MG/DL (ref 0.5–1.4)
EKG IMPRESSION: NORMAL
EKG IMPRESSION: NORMAL
EOSINOPHIL # BLD AUTO: 0.08 K/UL (ref 0–0.51)
EOSINOPHIL NFR BLD: 1.3 % (ref 0–6.9)
ERYTHROCYTE [DISTWIDTH] IN BLOOD BY AUTOMATED COUNT: 43.9 FL (ref 35.9–50)
GLOBULIN SER CALC-MCNC: 2 G/DL (ref 1.9–3.5)
GLUCOSE BLD-MCNC: 118 MG/DL (ref 65–99)
GLUCOSE BLD-MCNC: 148 MG/DL (ref 65–99)
GLUCOSE BLD-MCNC: 157 MG/DL (ref 65–99)
GLUCOSE SERPL-MCNC: 150 MG/DL (ref 65–99)
HCT VFR BLD AUTO: 40.4 % (ref 37–47)
HDLC SERPL-MCNC: 50 MG/DL
HGB BLD-MCNC: 13.4 G/DL (ref 12–16)
IMM GRANULOCYTES # BLD AUTO: 0.02 K/UL (ref 0–0.11)
IMM GRANULOCYTES NFR BLD AUTO: 0.3 % (ref 0–0.9)
LDLC SERPL CALC-MCNC: 70 MG/DL
LV EJECT FRACT  99904: 55
LYMPHOCYTES # BLD AUTO: 1.75 K/UL (ref 1–4.8)
LYMPHOCYTES NFR BLD: 28.5 % (ref 22–41)
MCH RBC QN AUTO: 31.1 PG (ref 27–33)
MCHC RBC AUTO-ENTMCNC: 33.2 G/DL (ref 33.6–35)
MCV RBC AUTO: 93.7 FL (ref 81.4–97.8)
MONOCYTES # BLD AUTO: 0.44 K/UL (ref 0–0.85)
MONOCYTES NFR BLD AUTO: 7.2 % (ref 0–13.4)
NEUTROPHILS # BLD AUTO: 3.83 K/UL (ref 2–7.15)
NEUTROPHILS NFR BLD: 62.5 % (ref 44–72)
NRBC # BLD AUTO: 0 K/UL
NRBC BLD-RTO: 0 /100 WBC
PLATELET # BLD AUTO: 174 K/UL (ref 164–446)
PMV BLD AUTO: 11.6 FL (ref 9–12.9)
POTASSIUM SERPL-SCNC: 3.6 MMOL/L (ref 3.6–5.5)
PROT SERPL-MCNC: 6.4 G/DL (ref 6–8.2)
RBC # BLD AUTO: 4.31 M/UL (ref 4.2–5.4)
SODIUM SERPL-SCNC: 139 MMOL/L (ref 135–145)
TRIGL SERPL-MCNC: 149 MG/DL (ref 0–149)
TROPONIN T SERPL-MCNC: <6 NG/L (ref 6–19)
TROPONIN T SERPL-MCNC: <6 NG/L (ref 6–19)
TSH SERPL DL<=0.005 MIU/L-ACNC: 3.57 UIU/ML (ref 0.38–5.33)
VANCOMYCIN TROUGH SERPL-MCNC: 17.4 UG/ML (ref 10–20)
WBC # BLD AUTO: 6.1 K/UL (ref 4.8–10.8)

## 2019-11-08 PROCEDURE — 700111 HCHG RX REV CODE 636 W/ 250 OVERRIDE (IP): Performed by: INTERNAL MEDICINE

## 2019-11-08 PROCEDURE — 93306 TTE W/DOPPLER COMPLETE: CPT | Mod: 26 | Performed by: INTERNAL MEDICINE

## 2019-11-08 PROCEDURE — 93005 ELECTROCARDIOGRAM TRACING: CPT | Performed by: INTERNAL MEDICINE

## 2019-11-08 PROCEDURE — 80202 ASSAY OF VANCOMYCIN: CPT

## 2019-11-08 PROCEDURE — 84484 ASSAY OF TROPONIN QUANT: CPT

## 2019-11-08 PROCEDURE — 93306 TTE W/DOPPLER COMPLETE: CPT

## 2019-11-08 PROCEDURE — 80061 LIPID PANEL: CPT

## 2019-11-08 PROCEDURE — 99233 SBSQ HOSP IP/OBS HIGH 50: CPT | Performed by: INTERNAL MEDICINE

## 2019-11-08 PROCEDURE — 770020 HCHG ROOM/CARE - TELE (206)

## 2019-11-08 PROCEDURE — 99232 SBSQ HOSP IP/OBS MODERATE 35: CPT | Performed by: INTERNAL MEDICINE

## 2019-11-08 PROCEDURE — 93010 ELECTROCARDIOGRAM REPORT: CPT | Performed by: INTERNAL MEDICINE

## 2019-11-08 PROCEDURE — 71045 X-RAY EXAM CHEST 1 VIEW: CPT

## 2019-11-08 PROCEDURE — 82962 GLUCOSE BLOOD TEST: CPT | Mod: 91

## 2019-11-08 PROCEDURE — 700102 HCHG RX REV CODE 250 W/ 637 OVERRIDE(OP): Performed by: INTERNAL MEDICINE

## 2019-11-08 PROCEDURE — 36415 COLL VENOUS BLD VENIPUNCTURE: CPT

## 2019-11-08 PROCEDURE — 80053 COMPREHEN METABOLIC PANEL: CPT

## 2019-11-08 PROCEDURE — A9270 NON-COVERED ITEM OR SERVICE: HCPCS | Performed by: INTERNAL MEDICINE

## 2019-11-08 PROCEDURE — 84443 ASSAY THYROID STIM HORMONE: CPT

## 2019-11-08 PROCEDURE — 85025 COMPLETE CBC W/AUTO DIFF WBC: CPT

## 2019-11-08 RX ORDER — ASPIRIN 325 MG
325 TABLET ORAL DAILY
Status: DISCONTINUED | OUTPATIENT
Start: 2019-11-08 | End: 2019-11-08

## 2019-11-08 RX ORDER — ASPIRIN 300 MG/1
300 SUPPOSITORY RECTAL DAILY
Status: DISCONTINUED | OUTPATIENT
Start: 2019-11-08 | End: 2019-11-08

## 2019-11-08 RX ORDER — NITROGLYCERIN 0.4 MG/1
0.4 TABLET SUBLINGUAL
Status: DISCONTINUED | OUTPATIENT
Start: 2019-11-08 | End: 2019-11-12 | Stop reason: HOSPADM

## 2019-11-08 RX ORDER — IBUPROFEN 600 MG/1
600 TABLET ORAL EVERY 6 HOURS PRN
Status: DISCONTINUED | OUTPATIENT
Start: 2019-11-08 | End: 2019-11-11

## 2019-11-08 RX ORDER — ASPIRIN 81 MG/1
324 TABLET, CHEWABLE ORAL DAILY
Status: DISCONTINUED | OUTPATIENT
Start: 2019-11-08 | End: 2019-11-08

## 2019-11-08 RX ADMIN — TRAZODONE HYDROCHLORIDE 100 MG: 100 TABLET ORAL at 17:21

## 2019-11-08 RX ADMIN — PREDNISONE 10 MG: 10 TABLET ORAL at 04:54

## 2019-11-08 RX ADMIN — METHOCARBAMOL TABLETS 750 MG: 750 TABLET, COATED ORAL at 12:28

## 2019-11-08 RX ADMIN — GABAPENTIN 300 MG: 300 CAPSULE ORAL at 16:54

## 2019-11-08 RX ADMIN — PREGABALIN 300 MG: 150 CAPSULE ORAL at 04:54

## 2019-11-08 RX ADMIN — MONTELUKAST 10 MG: 10 TABLET, FILM COATED ORAL at 04:54

## 2019-11-08 RX ADMIN — FUROSEMIDE 80 MG: 40 TABLET ORAL at 04:53

## 2019-11-08 RX ADMIN — MYCOPHENOLATE MOFETIL 1000 MG: 250 CAPSULE ORAL at 04:52

## 2019-11-08 RX ADMIN — HYDROMORPHONE HYDROCHLORIDE 0.5 MG: 1 INJECTION, SOLUTION INTRAMUSCULAR; INTRAVENOUS; SUBCUTANEOUS at 00:38

## 2019-11-08 RX ADMIN — METHOCARBAMOL TABLETS 750 MG: 750 TABLET, COATED ORAL at 17:28

## 2019-11-08 RX ADMIN — GABAPENTIN 300 MG: 300 CAPSULE ORAL at 20:49

## 2019-11-08 RX ADMIN — ZIPRASIDONE HYDROCHLORIDE 80 MG: 80 CAPSULE ORAL at 17:27

## 2019-11-08 RX ADMIN — ASPIRIN 81 MG 324 MG: 81 TABLET ORAL at 04:53

## 2019-11-08 RX ADMIN — SODIUM BICARBONATE 650 MG: 650 TABLET ORAL at 04:53

## 2019-11-08 RX ADMIN — BUSPIRONE HYDROCHLORIDE 10 MG: 10 TABLET ORAL at 17:21

## 2019-11-08 RX ADMIN — FOLIC ACID 1 MG: 1 TABLET ORAL at 04:54

## 2019-11-08 RX ADMIN — MYCOPHENOLATE MOFETIL 1000 MG: 250 CAPSULE ORAL at 17:28

## 2019-11-08 RX ADMIN — VANCOMYCIN HYDROCHLORIDE 125 MG: 10 INJECTION, POWDER, LYOPHILIZED, FOR SOLUTION INTRAVENOUS at 04:52

## 2019-11-08 RX ADMIN — SENNOSIDES AND DOCUSATE SODIUM 2 TABLET: 8.6; 5 TABLET ORAL at 05:04

## 2019-11-08 RX ADMIN — LEVOTHYROXINE SODIUM 75 MCG: 75 TABLET ORAL at 04:53

## 2019-11-08 RX ADMIN — OXYCODONE HYDROCHLORIDE 5 MG: 5 TABLET ORAL at 16:54

## 2019-11-08 RX ADMIN — NITROGLYCERIN 0.4 MG: 0.4 TABLET, ORALLY DISINTEGRATING SUBLINGUAL at 02:53

## 2019-11-08 RX ADMIN — OXYCODONE HYDROCHLORIDE 5 MG: 5 TABLET ORAL at 12:25

## 2019-11-08 RX ADMIN — ENOXAPARIN SODIUM 40 MG: 100 INJECTION SUBCUTANEOUS at 04:52

## 2019-11-08 RX ADMIN — POTASSIUM CHLORIDE 20 MEQ: 20 TABLET, EXTENDED RELEASE ORAL at 17:21

## 2019-11-08 RX ADMIN — SODIUM BICARBONATE 650 MG: 650 TABLET ORAL at 17:21

## 2019-11-08 RX ADMIN — OXYCODONE HYDROCHLORIDE 5 MG: 5 TABLET ORAL at 20:50

## 2019-11-08 RX ADMIN — BUSPIRONE HYDROCHLORIDE 10 MG: 10 TABLET ORAL at 04:52

## 2019-11-08 RX ADMIN — PREGABALIN 300 MG: 150 CAPSULE ORAL at 17:21

## 2019-11-08 RX ADMIN — POTASSIUM CHLORIDE 20 MEQ: 20 TABLET, EXTENDED RELEASE ORAL at 04:54

## 2019-11-08 RX ADMIN — METHOCARBAMOL TABLETS 750 MG: 750 TABLET, COATED ORAL at 04:52

## 2019-11-08 RX ADMIN — OXYCODONE HYDROCHLORIDE 5 MG: 5 TABLET ORAL at 02:53

## 2019-11-08 RX ADMIN — ZIPRASIDONE HYDROCHLORIDE 80 MG: 80 CAPSULE ORAL at 04:52

## 2019-11-08 RX ADMIN — TIOTROPIUM BROMIDE 1 CAPSULE: 18 CAPSULE ORAL; RESPIRATORY (INHALATION) at 04:54

## 2019-11-08 RX ADMIN — GABAPENTIN 300 MG: 300 CAPSULE ORAL at 12:25

## 2019-11-08 RX ADMIN — FLUOXETINE HYDROCHLORIDE 40 MG: 20 CAPSULE ORAL at 04:53

## 2019-11-08 RX ADMIN — GABAPENTIN 300 MG: 300 CAPSULE ORAL at 10:41

## 2019-11-08 ASSESSMENT — COGNITIVE AND FUNCTIONAL STATUS - GENERAL
SUGGESTED CMS G CODE MODIFIER DAILY ACTIVITY: CK
DAILY ACTIVITIY SCORE: 14
MOVING FROM LYING ON BACK TO SITTING ON SIDE OF FLAT BED: A LOT
WALKING IN HOSPITAL ROOM: A LOT
HELP NEEDED FOR BATHING: A LOT
STANDING UP FROM CHAIR USING ARMS: A LOT
TURNING FROM BACK TO SIDE WHILE IN FLAT BAD: A LOT
SUGGESTED CMS G CODE MODIFIER MOBILITY: CL
MOVING TO AND FROM BED TO CHAIR: A LOT
MOBILITY SCORE: 12
DRESSING REGULAR UPPER BODY CLOTHING: A LOT
DRESSING REGULAR LOWER BODY CLOTHING: A LOT
TOILETING: A LOT
CLIMB 3 TO 5 STEPS WITH RAILING: A LOT
PERSONAL GROOMING: A LOT

## 2019-11-08 ASSESSMENT — ENCOUNTER SYMPTOMS
MYALGIAS: 1
NERVOUS/ANXIOUS: 1
HEADACHES: 0
BLURRED VISION: 0
DIZZINESS: 0
NAUSEA: 0
NAUSEA: 1
CHILLS: 0
DIARRHEA: 0
HEARTBURN: 0
PALPITATIONS: 0
COUGH: 0
VOMITING: 0
HEADACHES: 1
FALLS: 0
FEVER: 0
ABDOMINAL PAIN: 0
SHORTNESS OF BREATH: 0

## 2019-11-08 ASSESSMENT — PAIN SCALES - WONG BAKER
WONGBAKER_NUMERICALRESPONSE: HURTS JUST A LITTLE BIT
WONGBAKER_NUMERICALRESPONSE: HURTS JUST A LITTLE BIT

## 2019-11-08 NOTE — PROGRESS NOTES
Infectious Disease Progress Note    Author: Lou Calderón M.D. Date & Time of service: 2019  10:42 AM    Chief Complaint:  Follow-up for bilateral heel pain    Interval History:  29-year-old woman with a history of psychiatric disorders, on diagnostic rheumatological disorder optic neuritis on CellCept and chronic low-dose steroids admitted for reported cellulitis of bilateral heels.     afebrile WBC 6.1.  Patient continues to complain of bilateral heel pain.  CT scan negative for abscess, infection or osteomyelitis.  Denies diarrhea    Labs Reviewed, Radiology Reviewed and Wound Reviewed.    Review of Systems:  Review of Systems   Constitutional: Positive for malaise/fatigue. Negative for chills and fever.   Gastrointestinal: Negative for abdominal pain, diarrhea, nausea and vomiting.   Musculoskeletal: Positive for myalgias.   Neurological: Negative for dizziness and headaches.   All other systems reviewed and are negative.      Hemodynamics:  Temp (24hrs), Av.6 °C (97.8 °F), Min:36.1 °C (97 °F), Max:36.9 °C (98.4 °F)  Temperature: 36.1 °C (97 °F)  Pulse  Av.4  Min: 71  Max: 103Heart Rate (Monitored): 98  Blood Pressure: 117/73       Physical Exam:  Physical Exam  Constitutional:       Appearance: She is obese.   HENT:      Head: Normocephalic and atraumatic.      Mouth/Throat:      Mouth: Mucous membranes are moist.      Pharynx: No oropharyngeal exudate.   Eyes:      Extraocular Movements: Extraocular movements intact.      Pupils: Pupils are equal, round, and reactive to light.   Neck:      Musculoskeletal: Normal range of motion.   Cardiovascular:      Rate and Rhythm: Normal rate and regular rhythm.   Pulmonary:      Effort: Pulmonary effort is normal.      Breath sounds: Normal breath sounds.   Abdominal:      Palpations: Abdomen is soft.   Musculoskeletal:         General: Tenderness present.      Right lower leg: Edema present.      Left lower leg: Edema present.      Comments:  Calluses on both heels.  There is no erythema present.  No open lesions   Neurological:      Mental Status: She is alert and oriented to person, place, and time.         Meds:    Current Facility-Administered Medications:   •  aspirin **OR** aspirin **OR** aspirin  •  nitroglycerin  •  busPIRone  •  Ivabradine HCl  •  FLUoxetine  •  albuterol  •  folic acid  •  gabapentin  •  ipratropium-albuterol  •  levothyroxine  •  pregabalin  •  methocarbamol  •  montelukast  •  mycophenolate  •  potassium chloride SA  •  furosemide  •  predniSONE  •  sodium bicarbonate  •  tiotropium  •  ziprasidone  •  senna-docusate **AND** polyethylene glycol/lytes **AND** magnesium hydroxide **AND** bisacodyl  •  enoxaparin  •  acetaminophen  •  Notify provider if pain remains uncontrolled **AND** Use the numeric rating scale (NRS-11) on regular floors and Critical-Care Pain Observation Tool (CPOT) on ICUs/Trauma to assess pain **AND** Pulse Ox (Oximetry) **AND** Pharmacy Consult Request **AND** If patient difficult to arouse and/or has respiratory depression, stop any opiates that are currently infusing and call a Rapid Response. **AND** oxyCODONE immediate-release **AND** [DISCONTINUED] oxyCODONE immediate-release **AND** HYDROmorphone  •  ondansetron  •  ondansetron  •  promethazine  •  promethazine  •  prochlorperazine  •  [DISCONTINUED] insulin glargine **AND** [DISCONTINUED] insulin lispro **AND** insulin lispro **AND** Accu-Chek ACHS **AND** NOTIFY MD and PharmD **AND** glucose **AND** dextrose 10% bolus  •  LR  •  traZODone    Labs:  Recent Labs     11/06/19  1530 11/07/19  0241 11/08/19  0014   WBC 6.3 6.6 6.1   RBC 4.23 4.15* 4.31   HEMOGLOBIN 13.5 12.9 13.4   HEMATOCRIT 39.3 40.1 40.4   MCV 92.9 96.6 93.7   MCH 31.9 31.1 31.1   RDW 43.2 46.0 43.9   PLATELETCT 192 179 174   MPV 11.9 11.9 11.6   NEUTSPOLYS 64.40  --  62.50   LYMPHOCYTES 25.20  --  28.50   MONOCYTES 8.30  --  7.20   EOSINOPHILS 1.10  --  1.30   BASOPHILS 0.50  --   0.20     Recent Labs     11/06/19  1530 11/07/19  0241 11/08/19  0014   SODIUM 141 140 139   POTASSIUM 3.9 3.8 3.6   CHLORIDE 109 110 104   CO2 19* 19* 22   GLUCOSE 109* 129* 150*   BUN 8 7* 8     Recent Labs     11/06/19  1530 11/07/19  0241 11/08/19  0014   ALBUMIN 4.3  --  4.4   TBILIRUBIN 0.4  --  0.4   ALKPHOSPHAT 43  --  42   TOTPROTEIN 6.2  --  6.4   ALTSGPT 37  --  40   ASTSGOT 18  --  21   CREATININE 0.64 0.62 0.88       Imaging:  Ct-foot W/o Plus Recons Right    Result Date: 11/7/2019 11/7/2019 3:28 PM HISTORY/REASON FOR EXAM:  Right foot pain and swelling. TECHNIQUE/ EXAM DESCRIPTION: CT scan of the RIGHT ankle/foot without contrast, with reconstructions. Thin-section helical noncontrast images were obtained from the distal tibia/fibula through the forefoot. Sagittal and coronal reconstructions were generated from the axial images. Up to date radiation dose reduction adjustments have been utilized to meet ALARA standards for radiation dose reduction. COMPARISON:  None. FINDINGS: There is no evidence of acute fracture. There is no evidence of dislocation. No marginal bone erosions are identified. No bone destruction is noted. Tibia and fibula appear to be intact. No acute fracture or bone erosion of the tarsal bones is appreciated. Metatarsal bones and phalanges appear to be intact. No soft tissue mass or fluid collection is identified. No soft tissue emphysema is appreciated.     1.  No acute fracture or dislocation. 2.  No soft tissue fluid collection or abscess is appreciated. 3.  No bone erosions are identified.    Ct-foot W/o Plus Recons Left    Result Date: 11/7/2019 11/7/2019 3:28 PM HISTORY/REASON FOR EXAM:  Foot pain and foot swelling. TECHNIQUE/ EXAM DESCRIPTION: CT scan of the LEFT ankle/foot without contrast, with reconstructions. Thin-section helical noncontrast images were obtained from the distal tibia/fibula through the forefoot. Sagittal and coronal reconstructions were generated from  the axial images. Up to date radiation dose reduction adjustments have been utilized to meet ALARA standards for radiation dose reduction. COMPARISON:  None. FINDINGS: There is no evidence of acute fracture of the distal tibia or fibula. Talus and calcaneus appear to be intact. Other tarsal bones are intact. No fracture of the metatarsal bones or phalanges are appreciated. No cortical disruption is identified. No bone erosion or bone destruction is appreciated. No soft tissue mass or fluid collection is identified.     1.  No acute fracture or dislocation. 2.  No soft tissue fluid collection or abscess is identified. 3.  No bone erosions are appreciated.    Dx-chest-portable (1 View)    Result Date: 11/8/2019 11/8/2019 12:07 AM HISTORY/REASON FOR EXAM: Chest Pain TECHNIQUE/EXAM DESCRIPTION:  Single AP view of the chest. COMPARISON: October 28, 2019 FINDINGS: The cardiac silhouette appears within normal limits. The mediastinal contour appears within normal limits.  The central pulmonary vasculature appears normal. Bilateral lung volumes are diminished.  Bilateral lungs are clear. No significant pleural effusions are identified. The bony structures appear age-appropriate.     1.  No acute cardiopulmonary disease.    Dx-chest-portable (1 View)    Result Date: 10/28/2019  10/28/2019 8:36 PM HISTORY/REASON FOR EXAM:  possible sepsis fever and cough. TECHNIQUE/EXAM DESCRIPTION AND NUMBER OF VIEWS: Single portable view of the chest. COMPARISON: 9/25/2019 FINDINGS: The heart, mediastinum, and kay are unremarkable. The lungs are well expanded and show no evidence of infiltrate or other acute process. There is no obvious pleural effusion. The bony thorax is grossly normal.     No acute cardiopulmonary abnormality.    Dx-foot-complete 3+ Left    Result Date: 11/6/2019 11/6/2019 3:52 PM HISTORY/REASON FOR EXAM:  Atraumatic Pain/Swelling/Deformity Left foot pain TECHNIQUE/EXAM DESCRIPTION AND NUMBER OF VIEWS: 3  "nonweightbearing views of the LEFT foot. COMPARISON:  None FINDINGS:  No acute fracture or malalignment. No bony destruction. There is diffuse soft tissue swelling. There is a small enthesophyte projecting from the plantar surface of the calcaneus.     1.  No radiographic evidence of osteomyelitis. 2.  Small calcaneal bone spur.    Dx-foot-complete 3+ Right    Result Date: 11/6/2019 11/6/2019 3:52 PM HISTORY/REASON FOR EXAM:  Atraumatic Pain/Swelling/Deformity Foot pain and redness TECHNIQUE/EXAM DESCRIPTION AND NUMBER OF VIEWS: 3 nonweightbearing views of the RIGHT foot. COMPARISON:  None FINDINGS:  No acute fracture or malalignment. Small enthesophytes project from the calcaneus. There is diffuse soft tissue edema.     1.  No radiographic evidence for osteomyelitis. 2.  Small calcaneal bone spurs.      Micro:  Results     Procedure Component Value Units Date/Time    BLOOD CULTURE x2 [[id number]] Collected:  11/06/19 1530    Order Status:  Completed Specimen:  Blood from Peripheral Updated:  11/07/19 0718     Significant Indicator NEG     Source BLD     Site PERIPHERAL     Culture Result No Growth  Note: Blood cultures are incubated for 5 days and  are monitored continuously.Positive blood cultures  are called to the RN and reported as soon as  they are identified.      Narrative:       Per Hospital Policy: Only change Specimen Src: to \"Line\" if  specified by physician order.  No site indicated    BLOOD CULTURE x2 [[id number]] Collected:  11/06/19 1544    Order Status:  Completed Specimen:  Blood from Peripheral Updated:  11/07/19 0718     Significant Indicator NEG     Source BLD     Site PERIPHERAL     Culture Result No Growth  Note: Blood cultures are incubated for 5 days and  are monitored continuously.Positive blood cultures  are called to the RN and reported as soon as  they are identified.      Narrative:       Per Hospital Policy: Only change Specimen Src: to \"Line\" if  specified by physician order.  Right " Hand    Urinalysis [[id number]]  (Abnormal) Collected:  11/07/19 0255    Order Status:  Completed Specimen:  Urine Updated:  11/07/19 0352     Color Yellow     Character Cloudy     Specific Gravity 1.033     Ph 5.0     Glucose Negative mg/dL      Ketones Negative mg/dL      Protein Negative mg/dL      Bilirubin Negative     Urobilinogen, Urine 0.2     Nitrite Negative     Leukocyte Esterase Trace     Occult Blood Moderate     Micro Urine Req Microscopic    Narrative:       If not done within the last 24 hours    Blood Culture [[id number]] Collected:  11/06/19 0000    Order Status:  Canceled Specimen:  Other from Peripheral     Blood Culture [[id number]] Collected:  11/06/19 0000    Order Status:  Canceled Specimen:  Other from Peripheral           Assessment:  Active Hospital Problems    Diagnosis   • *Bilateral heel cellulitis failed outpatient therapy [L03.90]   • Diabetes type 2, controlled ([facility]) [E11.9]   • Immunocompromised ([facility]) [D89.9]   • History of atrial fibrillation and cardiomyopathy? [Z86.79]   • Peripheral neuropathy and chornic pain syndrome (CMS-HCC) [G62.9]   • Other specified hypothyroidism [E03.8]       Plan:  Bilateral heel pain  Afebrile  No leukocytosis  Xrays - neg  CT scans-negative  ESR normal  Clinically patient does not have any evidence of soft tissue or deep infection  DC IV vancomycin  Her pain is out of proportion to her clinical exam, may benefit from nerve conduction studies and neurology evaluation    Type 2 diabetes mellitus, controlled  Last hemoglobin A1c 6 in August    Optic neuritis  On CellCept    Immunocompromised   On CellCept and low-dose prednisone    Plan of care discussed with internal medicine/Dr. Maloney.  Will sign off.  Please reconsult if needed

## 2019-11-08 NOTE — PROGRESS NOTES
I was called for chest pain and tachycardia  Given patient has a history of ablation for SVT there was concern that this could be a cardiac event.  Ordered echo, TSH and EKG.  Ordered troponins, telemetry, ASA, lipid panel.  Transferred to telemetry.

## 2019-11-08 NOTE — CONSULTS
"DATE OF SERVICE:  11/07/2019    INFECTIOUS DISEASE CONSULTATION    REASON FOR CONSULT:  Bilateral heel cellulitis.    CONSULTING PHYSICIAN:  Mario Estrada MD    HISTORY OF PRESENT ILLNESS:  This is a 29-year-old white female who is known   to the ID service from prior admissions, who is currently being admitted on   11/04/2019 due to the acute onset of new bilateral heel pain.  She had   previously been treated for recurrent cellulitis of the breast, which has   since resolved.  She states her CellCept was recently increased due to optic   neuritis and is very concerned about having a serious infection.  She states   she was in her usual state of health until about a week ago when she tore the   callus on her right heel and then developed some erythema and tenderness   related to that and then it \"spread to the left heel.\"  The pain was described   as 15/15 whenever she stood, improved with raising her feet and not bearing   weight. She also states that her feet are much colder than normal, \"like ice\".   She denied any associated fever, chills, nausea, vomiting, or   diarrhea.  She did go to the ED on Thursday prior to admission for foot pain   and was given the diagnosis of cellulitis and discharged on Augmentin with   oral vancomycin for Clostridium difficile because she did have complaints of   diarrhea as well.  Because she did not improve, she came back to the hospital   for further evaluation and management, although she had complaints of fever in   the ED, none is documented.  She had regular x-rays, which showed soft tissue   swelling.  She has been started on IV vancomycin and cefepime as well as   fluconazole.  Infectious disease is consulted for antibiotic recommendations   and management.    REVIEW OF SYSTEMS:  Positive for the persistent heel pain and anxiety related   to it.  Otherwise, all other systems are reviewed and are negative.    ALLERGIES:  SHE HAS AN EXTENSIVE LIST OF ALLERGIES AND DRUG " "INTOLERANCES.  SHE   STATES SHE IS ALLERGIC TO CEFDINIR; HOWEVER, SHE TOLERATED CEPHALOSPORINS.    SHE HAS VOMITING WITH DOXYCYCLINE. \"EYE PROBLEMS\" WITH FLAGYL.  NAUSEA AND   VOMITING WITH ERYTHROMYCIN.  NAUSEA WITH CLINDAMYCIN.  MUSCLE CRAMPS WITH   LEVAQUIN, SHE HAS HAD A HISTORY OF RED MAN SYNDROME.  ALSO STATES SHE HAD A   RASH WITH KEFLEX.  SOME OF HER DRUG ALLERGIES ARE DUPLICATED AND INTOLERANCES   HAVE BEEN DUPLICATED.    PAST MEDICAL HISTORY:  Borderline personality disorder, asthma, diabetes,   polycystic ovarian syndrome, hypothyroidism, prior latent TB infection that   was treated, and chronic pain.    PAST SURGICAL HISTORY:  Lap delmar, lumbar fusion, suprapubic catheter, prior   rectal pacemaker.    FAMILY HISTORY:  Hypertension, sleep apnea, heart disease, and hypothyroidism.    SOCIAL HISTORY:  She has used marijuana edibles.  No IV drugs.  No other   illicit drug use.  No smoking.    PHYSICAL EXAMINATION:  GENERAL:  She is a pale, well-nourished, well-developed female in no acute   distress.  VITAL SIGNS:  She has been afebrile since admission, current temperature is   98.7, blood pressure 127/64, pulse 90, respiratory rate 20, oxygen saturation   95% on 1 liter nasal cannula.  She weighs 128 kilos.  Her BMI is 47.  HEENT:  Normocephalic and atraumatic.  Pupils are equal, round, and reactive   to light.  Extraocular movements are intact.  Oropharynx is clear.  NECK:  Supple.  There is no JVD or stridor.  CARDIOVASCULAR:  Regular rate and rhythm.  Heart sounds are distant.  CHEST:  Clear to auscultation bilaterally, unlabored.  There is no wheezing or   rhonchi.  Breath sounds are distant.  ABDOMEN:  Obese, soft, nontender, and nondistended.  There is no rebound or   guarding.  EXTREMITIES:  Show bilateral lower extremity swelling.  She has thick calluses   on bilateral heels with some surrounding faint erythema.  There is no   extension of the legs.  No deformity. Feet are neither hot nor " cold  NEUROLOGIC:  She is awake, alert, and oriented.  Speech is fluent without   dysarthria.  Cranial nerves are intact.  She is moving all extremities.  PSYCHIATRIC:  Behavior is normal.  Affect is anxious.    LABORATORY DATA:  Current labs show white blood cell count is 6.6, H and H   12.9 and 40, and platelets 179.  She has no left shift.  Sed rate is 13.    Sodium is 140, potassium 3.8, chloride 110, bicarbonate 19, glucose 129, BUN   7, and creatinine 0.62.    Urinalysis shows moderate blood, 20-50 wbc's, 10-20 rbc's, moderate epi and   few calcium oxalate stones.    Blood cultures x2 are negative.    C. diff on 10/30/2019 was negative.    Micro reviewed.  She did test positive for C. diff by PCR on 12/05/2018, but   toxin was negative.    DIAGNOSTIC DATA:  X-rays of the right foot showed no osteomyelitis and   calcaneal bone spurs with diffuse soft tissue edema.    X-rays of the left foot showed calcaneal bone spur.  No osteomyelitis.    Diffuse soft tissue swelling.    ASSESSMENT AND PLAN:  This is a 29-year-old female with a    psychiatric disorder, undiagnosed rheumatologic disorder, on CellCept due to optic neuritis as well as   chronic low-dose steroids, admitted with reported cellulitis of bilateral   heels.  Pictures were reviewed in media and no brandon cellulitis is seen, nor on   exam today or on admission.  She does have faint erythema surrounding her   calluses.  Feet are not abnormally hot nor cold.  She denies any trauma or acute injury.  Her x-rays were   remarkable for bone spurs.  No visible fracture or osteomyelitis.  No   overlying wounds to suggest osteomyelitis.  I do recommend getting CT scan to   verify that there is no underlying fracture.  I would consider also nerve   conduction studies because pain is out of proportion to physical findings and sensation of feeling unusually cold.  I   doubt this is osteo, but pending CT results, we will hold off discontinuation   of vancomycin. Will  discontinue the cefepime as there are no open wounds and concern for gram negative etiology is low.  Continue to monitor   renal function closely while she is on the vancomycin.  I do not recommend   oral vancomycin at this time.  Her prior diarrhea has resolved.  Her   Clostridium difficile is negative.  She is immunocompromised, so she may not   manifest the normal erythema that would be associated with cellulitis;   however, the location on her heels and bilateral presentation with improvement with   elevation of her legs are suggestive of a stasis or an inflammatory process.  She   does have chronic neuropathy, chronic pain, and likely has hyperesthesia.    She is on Lyrica, gabapentin, and Robaxin.  Continue to monitor.  She is   diabetic.  She has no current wounds on her feet, so defer LPS evaluation for now.    We will need good blood sugar control to promote healing.  I discussed with   internal medicine.    Thank you and we will follow with you.       ____________________________________     MD DELFIN GARRETT / CHRISTELLE    DD:  11/07/2019 14:36:37  DT:  11/07/2019 18:26:03    D#:  [id number]  Job#:  [id number]

## 2019-11-08 NOTE — PROGRESS NOTES
Pharmacy Kinetics 29 y.o. female on vancomycin day # 2     11/7/2019    Currently on Vancomycin 2000 mg iv q 12hr  Provider specified end date: 11/13    Indication for Treatment: Bilateral heel cellulitis     Pertinent history per medical record:   Admitted on 11/6/2019 due to bilateral foot pain which began in the morning. Patient was recently seen in the ER for similar cellulitis and was discharged on Augmentin for the cellulitis and PO vancomycin due to concern of C. difficile. Patient reported back due to the foot pain becoming even worse. Patient is afebrile and has no leukocytosis and patient's right and left foot x-rays on 11/06 did not show radiographic evidence for osteomyelitis. Patient does have a history pertinent for MRSA due to breast abscess and a history of red man syndrome but is able to tolerate vancomycin when given via slow infusion.     Other antibiotics:   Cefepime 2 g IV q 12h  Vancomycin 125 mg PO BID       Allergies: Cefdinir; Depakote [divalproex sodium]; Doxycycline; Amitriptyline; Aripiprazole [abilify]; Clindamycin; Ees [erythromycin]; Flagyl [metronidazole hcl]; Flomax [tamsulosin hydrochloride]; Levaquin; Metformin; Tape; Vancomycin; Wound dressing adhesive; Cephalexin [keflex]; Divalproex sodium [valproic acid]; Erythromycin; Levofloxacin; Metronidazole; and Tamsulosin hcl     List concerns for renal function: BMI 47.09 kg/m²    Pertinent cultures to date:   11/6/19: Blood culture x 2 - Negative to date   11/6/19: Blood culture x 2 - Negative to date     MRSA nares swab if pneumonia is a concern (ordered/positive/negative/n-a): n-a    Recent Labs     11/06/19  1530 11/07/19  0241   WBC 6.3 6.6   NEUTSPOLYS 64.40  --      Recent Labs     11/06/19  1530 11/07/19  0241   BUN 8 7*   CREATININE 0.64 0.62   ALBUMIN 4.3  --      No results for input(s): VANCOTROUGH, VANCOPEAK, VANCORANDOM in the last 72 hours.    Intake/Output Summary (Last 24 hours) at 11/7/2019 7044  Last data filed at  "2019 1159  Gross per 24 hour   Intake 300 ml   Output 1700 ml   Net -1400 ml      /78   Pulse 100   Temp 36.9 °C (98.4 °F) (Temporal)   Resp 16   Ht 1.651 m (5' 5\")   Wt (!) 128.4 kg (283 lb)   SpO2 96%  Temp (24hrs), Av.9 °C (98.5 °F), Min:36.7 °C (98.1 °F), Max:37.2 °C (98.9 °F)      A/P   1. Vancomycin dose change: Continue with 2,000 mg IV Q12h (0500, 1700)  2. Next vancomycin level: 19 at 0430 before the 4th dose   3. Goal trough: 12-16 mcg/mL  4. Comments: Patient continues to have stable renal function, only major concern for renal function and accumulation is BMI, 16 mg/kg dosing is still appropriate. Patient continues to be afebrile and continues to have no leukocytosis. Only cultures that have resulted so far have been blood cultures which are both negative to date. Trough level ordered for  before the 4th dose. Pharmacy will continue to follow culture results, kidney function, vancomycin levels, and will plan for de-escalation of antibiotics as appropriate.     Josafat Triana PharmD   "

## 2019-11-08 NOTE — PROGRESS NOTES
Patient is upset that nebulizer treatments are PRN and not scheduled. Patient educated and respiratory notified.

## 2019-11-08 NOTE — PROGRESS NOTES
Report received from day RNMELINDA. Assumed care of pt at 1900. Pt is A&Ox4 on room air. Patient reporting intense pain in her feet, refer to flowsheet for documentation, and MAR for medication administration. Plan of care discussed and all questions answered. Patient appears restless. Fall precautions in place - bed is in low and locked position, call light/belongings within reach, bed alarm on.

## 2019-11-08 NOTE — CODE DOCUMENTATION
Patient complaining of chest pain radiating to left shoulder, up left neck. Lab at bedside, EKG at bedside.

## 2019-11-08 NOTE — PROGRESS NOTES
Rapid response called at 1152, CXR ordered, MD paged.  Pt reporting pressure on chest, with radiating pain to right shoulder.   Pt denies shortness of breath, but reports increased pain with inhalation.

## 2019-11-08 NOTE — PROGRESS NOTES
Pt care assumed, Patient transferred to 812-1. Tele box on.  Pt denies any additional needs at this time. Bed in lowest position, bed alarm on pt educated on fall risk and verbalized understanding, call light within reach.

## 2019-11-09 LAB
GLUCOSE BLD-MCNC: 102 MG/DL (ref 65–99)
GLUCOSE BLD-MCNC: 107 MG/DL (ref 65–99)
GLUCOSE BLD-MCNC: 122 MG/DL (ref 65–99)
GLUCOSE BLD-MCNC: 95 MG/DL (ref 65–99)

## 2019-11-09 PROCEDURE — A9270 NON-COVERED ITEM OR SERVICE: HCPCS | Performed by: INTERNAL MEDICINE

## 2019-11-09 PROCEDURE — 700102 HCHG RX REV CODE 250 W/ 637 OVERRIDE(OP): Performed by: INTERNAL MEDICINE

## 2019-11-09 PROCEDURE — 97162 PT EVAL MOD COMPLEX 30 MIN: CPT

## 2019-11-09 PROCEDURE — 99232 SBSQ HOSP IP/OBS MODERATE 35: CPT | Performed by: INTERNAL MEDICINE

## 2019-11-09 PROCEDURE — 82962 GLUCOSE BLOOD TEST: CPT | Mod: 91

## 2019-11-09 PROCEDURE — 700111 HCHG RX REV CODE 636 W/ 250 OVERRIDE (IP): Performed by: INTERNAL MEDICINE

## 2019-11-09 PROCEDURE — 770020 HCHG ROOM/CARE - TELE (206)

## 2019-11-09 PROCEDURE — 97165 OT EVAL LOW COMPLEX 30 MIN: CPT

## 2019-11-09 RX ADMIN — SODIUM BICARBONATE 650 MG: 650 TABLET ORAL at 04:58

## 2019-11-09 RX ADMIN — TIOTROPIUM BROMIDE 1 CAPSULE: 18 CAPSULE ORAL; RESPIRATORY (INHALATION) at 05:00

## 2019-11-09 RX ADMIN — ENOXAPARIN SODIUM 40 MG: 100 INJECTION SUBCUTANEOUS at 05:00

## 2019-11-09 RX ADMIN — BUSPIRONE HYDROCHLORIDE 10 MG: 10 TABLET ORAL at 16:43

## 2019-11-09 RX ADMIN — LEVOTHYROXINE SODIUM 75 MCG: 75 TABLET ORAL at 04:58

## 2019-11-09 RX ADMIN — ZIPRASIDONE HYDROCHLORIDE 80 MG: 80 CAPSULE ORAL at 04:58

## 2019-11-09 RX ADMIN — PREDNISONE 10 MG: 10 TABLET ORAL at 04:58

## 2019-11-09 RX ADMIN — FLUOXETINE HYDROCHLORIDE 40 MG: 20 CAPSULE ORAL at 04:59

## 2019-11-09 RX ADMIN — ZIPRASIDONE HYDROCHLORIDE 80 MG: 80 CAPSULE ORAL at 16:44

## 2019-11-09 RX ADMIN — HYDROMORPHONE HYDROCHLORIDE 0.5 MG: 1 INJECTION, SOLUTION INTRAMUSCULAR; INTRAVENOUS; SUBCUTANEOUS at 20:23

## 2019-11-09 RX ADMIN — SODIUM BICARBONATE 650 MG: 650 TABLET ORAL at 16:44

## 2019-11-09 RX ADMIN — MYCOPHENOLATE MOFETIL 1000 MG: 250 CAPSULE ORAL at 16:43

## 2019-11-09 RX ADMIN — GABAPENTIN 300 MG: 300 CAPSULE ORAL at 17:00

## 2019-11-09 RX ADMIN — POTASSIUM CHLORIDE 20 MEQ: 20 TABLET, EXTENDED RELEASE ORAL at 04:59

## 2019-11-09 RX ADMIN — POTASSIUM CHLORIDE 20 MEQ: 20 TABLET, EXTENDED RELEASE ORAL at 16:44

## 2019-11-09 RX ADMIN — FUROSEMIDE 80 MG: 40 TABLET ORAL at 05:00

## 2019-11-09 RX ADMIN — METHOCARBAMOL TABLETS 750 MG: 750 TABLET, COATED ORAL at 05:00

## 2019-11-09 RX ADMIN — GABAPENTIN 300 MG: 300 CAPSULE ORAL at 09:35

## 2019-11-09 RX ADMIN — OXYCODONE HYDROCHLORIDE 5 MG: 5 TABLET ORAL at 09:35

## 2019-11-09 RX ADMIN — OXYCODONE HYDROCHLORIDE 5 MG: 5 TABLET ORAL at 04:58

## 2019-11-09 RX ADMIN — OXYCODONE HYDROCHLORIDE 5 MG: 5 TABLET ORAL at 12:37

## 2019-11-09 RX ADMIN — METHOCARBAMOL TABLETS 750 MG: 750 TABLET, COATED ORAL at 11:07

## 2019-11-09 RX ADMIN — BUSPIRONE HYDROCHLORIDE 10 MG: 10 TABLET ORAL at 05:00

## 2019-11-09 RX ADMIN — GABAPENTIN 300 MG: 300 CAPSULE ORAL at 12:37

## 2019-11-09 RX ADMIN — OXYCODONE HYDROCHLORIDE 5 MG: 5 TABLET ORAL at 16:44

## 2019-11-09 RX ADMIN — METHOCARBAMOL TABLETS 750 MG: 750 TABLET, COATED ORAL at 16:43

## 2019-11-09 RX ADMIN — TRAZODONE HYDROCHLORIDE 100 MG: 100 TABLET ORAL at 16:44

## 2019-11-09 RX ADMIN — PREGABALIN 300 MG: 150 CAPSULE ORAL at 16:43

## 2019-11-09 RX ADMIN — SENNOSIDES AND DOCUSATE SODIUM 2 TABLET: 8.6; 5 TABLET ORAL at 05:00

## 2019-11-09 RX ADMIN — MONTELUKAST 10 MG: 10 TABLET, FILM COATED ORAL at 04:59

## 2019-11-09 RX ADMIN — GABAPENTIN 300 MG: 300 CAPSULE ORAL at 20:23

## 2019-11-09 RX ADMIN — MYCOPHENOLATE MOFETIL 1000 MG: 250 CAPSULE ORAL at 04:57

## 2019-11-09 RX ADMIN — FOLIC ACID 1 MG: 1 TABLET ORAL at 04:59

## 2019-11-09 RX ADMIN — PREGABALIN 300 MG: 150 CAPSULE ORAL at 04:59

## 2019-11-09 ASSESSMENT — COGNITIVE AND FUNCTIONAL STATUS - GENERAL
MOVING TO AND FROM BED TO CHAIR: UNABLE
DRESSING REGULAR LOWER BODY CLOTHING: A LOT
DAILY ACTIVITIY SCORE: 14
WALKING IN HOSPITAL ROOM: A LITTLE
TOILETING: A LOT
TURNING FROM BACK TO SIDE WHILE IN FLAT BAD: UNABLE
DRESSING REGULAR UPPER BODY CLOTHING: A LOT
SUGGESTED CMS G CODE MODIFIER DAILY ACTIVITY: CK
PERSONAL GROOMING: A LOT
MOBILITY SCORE: 11
HELP NEEDED FOR BATHING: A LOT
CLIMB 3 TO 5 STEPS WITH RAILING: A LOT
STANDING UP FROM CHAIR USING ARMS: A LITTLE
SUGGESTED CMS G CODE MODIFIER MOBILITY: CL
MOVING FROM LYING ON BACK TO SITTING ON SIDE OF FLAT BED: UNABLE

## 2019-11-09 ASSESSMENT — ENCOUNTER SYMPTOMS
FALLS: 0
HEARTBURN: 0
PALPITATIONS: 0
COUGH: 0
DIZZINESS: 0
NAUSEA: 0
VOMITING: 0
DIARRHEA: 0
HEADACHES: 0
MYALGIAS: 1
NERVOUS/ANXIOUS: 1
BLURRED VISION: 0
FEVER: 0
CHILLS: 0
SHORTNESS OF BREATH: 0
ABDOMINAL PAIN: 0

## 2019-11-09 ASSESSMENT — ACTIVITIES OF DAILY LIVING (ADL): TOILETING: REQUIRES ASSIST

## 2019-11-09 ASSESSMENT — GAIT ASSESSMENTS: GAIT LEVEL OF ASSIST: REFUSED

## 2019-11-09 NOTE — THERAPY
"Occupational Therapy Evaluation completed.   Functional Status:  Angelita sit>stand, refused functional txf but has completed with nursing, Vincenzo toileting hygiene, Vincenzo PAULSON dress, set-up seated grooming (but requests assist), self-limiting  Plan of Care: Will benefit from Occupational Therapy 2 times per week  Discharge Recommendations:  Equipment: Will Continue to Assess for Equipment Needs. Post-acute therapy Recommend home health transitional care for continued occupational therapy services. Recommend pt consider increased hired help as burden of care is too great on her grandmother. Pt refuses group home recommendation.     See \"Rehab Therapy-Acute\" Patient Summary Report for complete documentation.    Pt is 28yo female with pmhx significant for psych disorders, DMII, peripheral neuropathy and chronic pain syndrome, presents with pain in bilateral heels. Pt reports supportive home environment with DME in place, a wheelchair that fits in her home and extensive history of HH OT/PT. Pt presents to OT kayla very concerned that \"they might send me home because I don't have a medical reason to be here\". Pt lives with her elderly grandmother who has trouble providing degree of care pt requests, however functionally pt appears much more capable of self-care than she chooses to engage in.  Pt demonstrated sit>stand from bedside chair (pt moved to bedside chair with nursing assisted stand-pivot transfer), c/o pain but completed full upright stand and tolerated +30 seconds static standing. Pt demonstrated adequate strength/AROM in BUEs to reach above her head however reports caregivers bathe her completely, wash her hair, dress her, and occasionally feed her because she is \"too weak\". Pt is very accustomed to being taken care of by caregivers/her grandmother. Recommend pt consider paid caregivers to provide level of care she requests as the burden of care is too much on her elderly grandmother. Pt is physically capable of " completing functional transfers and managing self-care ADLs from a seated position in her wheelchair with adaptive equipment as needed, limited by chronic pain and lack of motivation for independence. Acute OT to encourage pts engagement in self care and teach compensatory strategies to reduce burden of care on grandmother.

## 2019-11-09 NOTE — PROGRESS NOTES
Hospital Medicine Daily Progress Note    Date of Service  11/9/2019    Chief Complaint  29 y.o. female admitted 11/6/2019 with foot pain     Hospital Course    29-year-old female with complicated history of borderline personality, schizophrenia, type 2 diabetes, morbid obesity,  IAST treated with corlanor, previous ablation for sinus node modification TONYA, optic neuritis and immunosuppression therapy presented with bilateral foot pain.  She was just discharged a week ago concerning for cellulitis and C. difficile.  She was prescribed Augmentin and vancomycin oral (c diff negative).  Pain was getting worse.  She was describing burning sensation.  Significant pain during touch.  No purulent discharges.  She usually goes barefoot and she does not wear footwear.  She has history of MRSA from breast abscess.  No leukocytosis.  Afebrile. No redness on following morning. significant pain on light touch. CT scan for further eval to rule out fracture. She was placed on cefepime and vancomycin. She has h/o ioana syndrome, but she had been able to tolerate slow infusion. ID consulted and did not think she had any cellulitis or infection and recommending stopping all antibiotics.         Interval Problem Update  - no acute events overnight  - worked with PT/OT this am but no note, sounds like at baseline and needs wheelchair  - still having heel pain today  - eating/drinking well  - on RA, no SOB, no chest pain      Consultants/Specialty  ID     Code Status  Full code    Disposition  Inpatient, then likely SNF/rehab vs     Review of Systems  Review of Systems   Constitutional: Positive for malaise/fatigue. Negative for chills and fever.   Eyes: Negative for blurred vision.   Respiratory: Negative for cough and shortness of breath.    Cardiovascular: Negative for chest pain, palpitations and leg swelling.   Gastrointestinal: Negative for abdominal pain, diarrhea, heartburn, nausea and vomiting.   Genitourinary: Negative for  urgency.   Musculoskeletal: Positive for joint pain and myalgias. Negative for falls.   Neurological: Negative for dizziness and headaches.   Psychiatric/Behavioral: The patient is nervous/anxious.         Physical Exam  Temp:  [36.2 °C (97.1 °F)-36.6 °C (97.8 °F)] 36.3 °C (97.4 °F)  Pulse:  [76-90] 78  Resp:  [16-17] 16  BP: (108-129)/(57-76) 108/60  SpO2:  [90 %-98 %] 90 %    Physical Exam  Vitals signs and nursing note reviewed.   Constitutional:       General: She is not in acute distress.     Appearance: Normal appearance. She is obese. She is not diaphoretic.   HENT:      Head: Normocephalic.      Comments: Coarse facial features and stigmata of Cushing's     Right Ear: External ear normal.      Left Ear: External ear normal.      Nose: No congestion.   Eyes:      Extraocular Movements: Extraocular movements intact.   Neck:      Musculoskeletal: Neck supple. No neck rigidity.   Cardiovascular:      Rate and Rhythm: Normal rate and regular rhythm.      Heart sounds: No murmur.   Pulmonary:      Effort: Pulmonary effort is normal. No respiratory distress.      Breath sounds: Normal breath sounds. No stridor. No wheezing or rhonchi.   Abdominal:      General: Abdomen is flat. There is no distension.      Palpations: Abdomen is soft. There is no mass.      Tenderness: There is no tenderness.      Hernia: No hernia is present.   Musculoskeletal:         General: Tenderness present. No swelling, deformity or signs of injury.      Comments: Painful when touching any part of her foot and heels (even very soft touch), no swelling, does have severe pain with passive dorsiflexion and odes feel tight   Skin:     Coloration: Skin is not jaundiced.   Neurological:      General: No focal deficit present.      Mental Status: She is alert and oriented to person, place, and time.   Psychiatric:         Mood and Affect: Mood normal.         Behavior: Behavior normal.         Fluids    Intake/Output Summary (Last 24 hours) at  11/9/2019 1203  Last data filed at 11/9/2019 0835  Gross per 24 hour   Intake 240 ml   Output 400 ml   Net -160 ml       Laboratory  Recent Labs     11/06/19  1530 11/07/19  0241 11/08/19  0014   WBC 6.3 6.6 6.1   RBC 4.23 4.15* 4.31   HEMOGLOBIN 13.5 12.9 13.4   HEMATOCRIT 39.3 40.1 40.4   MCV 92.9 96.6 93.7   MCH 31.9 31.1 31.1   MCHC 34.4 32.2* 33.2*   RDW 43.2 46.0 43.9   PLATELETCT 192 179 174   MPV 11.9 11.9 11.6     Recent Labs     11/06/19  1530 11/07/19  0241 11/08/19  0014   SODIUM 141 140 139   POTASSIUM 3.9 3.8 3.6   CHLORIDE 109 110 104   CO2 19* 19* 22   GLUCOSE 109* 129* 150*   BUN 8 7* 8   CREATININE 0.64 0.62 0.88   CALCIUM 9.2 8.8 9.4     Recent Labs     11/06/19  1530   INR 0.93         Recent Labs     11/08/19  0430   TRIGLYCERIDE 149   HDL 50   LDL 70       Imaging  EC-ECHOCARDIOGRAM COMPLETE W/O CONT   Final Result      DX-CHEST-PORTABLE (1 VIEW)   Final Result         1.  No acute cardiopulmonary disease.      CT-FOOT W/O PLUS RECONS RIGHT   Final Result      1.  No acute fracture or dislocation.      2.  No soft tissue fluid collection or abscess is appreciated.      3.  No bone erosions are identified.      CT-FOOT W/O PLUS RECONS LEFT   Final Result      1.  No acute fracture or dislocation.      2.  No soft tissue fluid collection or abscess is identified.      3.  No bone erosions are appreciated.      DX-FOOT-COMPLETE 3+ RIGHT   Final Result      1.  No radiographic evidence for osteomyelitis.      2.  Small calcaneal bone spurs.      DX-FOOT-COMPLETE 3+ LEFT   Final Result      1.  No radiographic evidence of osteomyelitis.      2.  Small calcaneal bone spur.           Assessment/Plan  * Heel pain, bilateral  Assessment & Plan  Previous recent history of cellulitis, at home she was on augmentin , however pain persisted so returned to ED  Initially on cefepime, vancomycin infectious disease consulted and discontinued on 11/8  Negative C. difficile testing discontinue oral vancomycin as  "well  Initially placed on fluconazole however discontinued given concern for possible toxicities  CT scan without evidence of acute infection  I wonder about possible plantar fasciitis vs chronic pain syndrome vs ideopathic peripheral neuropathy (DM very well controlled), will start NSAIDs (renal function normal), consulted PT/OT  Patient very nervous about taking care of herself at home, will f/u with PT/OT regarding dc planning    History of atrial fibrillation and cardiomyopathy?  Assessment & Plan  Not on anticoagulation  On aspirin and ivadarbine  On LAsix with K supplementation  COntinue these medications   IAST treated with corlanor, pervious ablation for sinus node modification  Continue corlanor   Echo 11/8: EF 55%, normal echo    Immunocompromised (HCC)  Assessment & Plan  On Cellcept and prednisone for optic neuritis and \"poor immune system\".  Avoid probiotics.      Peripheral neuropathy and chornic pain syndrome (CMS-HCC)- (present on admission)  Assessment & Plan  On Lyrica and gabapentin  Continue RObaxin    Other specified hypothyroidism  Assessment & Plan  Continue Synthroid.    Diabetes type 2, controlled (HCC)  Assessment & Plan  Continue just ISSS   Home regimen: trtherese montes   Hypoglycemia protocol   Last a1c 6.0        VTE prophylaxis: lovenox       "

## 2019-11-09 NOTE — PROGRESS NOTES
Hospital Medicine Daily Progress Note    Date of Service  11/8/2019    Chief Complaint  29 y.o. female admitted 11/6/2019 with foot pain     Hospital Course    29-year-old female with complicated history of borderline personality, schizophrenia, type 2 diabetes, morbid obesity,  IAST treated with corlanor, previous ablation for sinus node modification TONYA, optic neuritis and immunosuppression therapy presented with bilateral foot pain.  She was just discharged a week ago concerning for cellulitis and C. difficile.  She was prescribed Augmentin and vancomycin oral (c diff negative).  Pain was getting worse.  She was describing burning sensation.  Significant pain during touch.  No purulent discharges.  She usually goes barefoot and she does not wear footwear.  She has history of MRSA from breast abscess.  No leukocytosis.  Afebrile. No redness on following morning. significant pain on light touch. CT scan for further eval to rule out fracture. She was placed on cefepime and vancomycin. She has h/o ioana syndrome, but she had been able to tolerate slow infusion. ID consulted and did not think she had any cellulitis or infection and recommending stopping all antibiotics.         Interval Problem Update  -Transfer to telemetry overnight for chest pain, cardiac biomarkers negative, EKG without acute ST/T wave changes  - Echocardiogram normal  - Very upset when I walked in the room because she was worried we were going to discharge her when she was having severe pain and unable to walk, reports that her grandma lives with her and is unable to take care of her  -Reports severe lower extremity pain can barely walk, sharp pain around heels, redness improved  -Infectious disease signed off and discontinued all antibiotics as no concern for acute infection      Consultants/Specialty  ID     Code Status  Full code    Disposition  Inpatient     Review of Systems  Review of Systems   Constitutional: Positive for malaise/fatigue.  Negative for chills and fever.   Eyes: Negative for blurred vision.   Respiratory: Negative for cough and shortness of breath.    Cardiovascular: Negative for chest pain, palpitations and leg swelling.   Gastrointestinal: Positive for nausea. Negative for abdominal pain, diarrhea, heartburn and vomiting.   Genitourinary: Negative for urgency.   Musculoskeletal: Positive for joint pain and myalgias. Negative for falls.   Neurological: Positive for headaches. Negative for dizziness.   Psychiatric/Behavioral: The patient is nervous/anxious.         Physical Exam  Temp:  [36.1 °C (97 °F)-36.7 °C (98 °F)] 36.3 °C (97.4 °F)  Pulse:  [] 76  Resp:  [16-22] 17  BP: (101-139)/(63-74) 127/72  SpO2:  [91 %-99 %] 98 %    Physical Exam  Vitals signs and nursing note reviewed.   Constitutional:       General: She is not in acute distress.     Appearance: Normal appearance. She is obese. She is not diaphoretic.   HENT:      Head: Normocephalic.      Comments: Coarse facial features and stigmata of Cushing's     Right Ear: External ear normal.      Left Ear: External ear normal.      Nose: No congestion.   Eyes:      Extraocular Movements: Extraocular movements intact.   Neck:      Musculoskeletal: Neck supple. No neck rigidity.   Cardiovascular:      Rate and Rhythm: Normal rate and regular rhythm.      Heart sounds: No murmur.   Pulmonary:      Effort: Pulmonary effort is normal. No respiratory distress.      Breath sounds: Normal breath sounds. No stridor. No wheezing or rhonchi.   Abdominal:      General: Abdomen is flat. There is no distension.      Palpations: Abdomen is soft. There is no mass.      Tenderness: There is no tenderness.      Hernia: No hernia is present.   Musculoskeletal:         General: Tenderness present. No swelling, deformity or signs of injury.      Comments: Painful when touching her heels    Skin:     Coloration: Skin is not jaundiced.   Neurological:      General: No focal deficit present.       Mental Status: She is alert and oriented to person, place, and time.   Psychiatric:         Mood and Affect: Mood normal.         Behavior: Behavior normal.         Fluids    Intake/Output Summary (Last 24 hours) at 11/8/2019 1937  Last data filed at 11/8/2019 0100  Gross per 24 hour   Intake 980 ml   Output --   Net 980 ml       Laboratory  Recent Labs     11/06/19  1530 11/07/19  0241 11/08/19  0014   WBC 6.3 6.6 6.1   RBC 4.23 4.15* 4.31   HEMOGLOBIN 13.5 12.9 13.4   HEMATOCRIT 39.3 40.1 40.4   MCV 92.9 96.6 93.7   MCH 31.9 31.1 31.1   MCHC 34.4 32.2* 33.2*   RDW 43.2 46.0 43.9   PLATELETCT 192 179 174   MPV 11.9 11.9 11.6     Recent Labs     11/06/19  1530 11/07/19  0241 11/08/19  0014   SODIUM 141 140 139   POTASSIUM 3.9 3.8 3.6   CHLORIDE 109 110 104   CO2 19* 19* 22   GLUCOSE 109* 129* 150*   BUN 8 7* 8   CREATININE 0.64 0.62 0.88   CALCIUM 9.2 8.8 9.4     Recent Labs     11/06/19  1530   INR 0.93         Recent Labs     11/08/19  0430   TRIGLYCERIDE 149   HDL 50   LDL 70       Imaging  EC-ECHOCARDIOGRAM COMPLETE W/O CONT   Final Result      DX-CHEST-PORTABLE (1 VIEW)   Final Result         1.  No acute cardiopulmonary disease.      CT-FOOT W/O PLUS RECONS RIGHT   Final Result      1.  No acute fracture or dislocation.      2.  No soft tissue fluid collection or abscess is appreciated.      3.  No bone erosions are identified.      CT-FOOT W/O PLUS RECONS LEFT   Final Result      1.  No acute fracture or dislocation.      2.  No soft tissue fluid collection or abscess is identified.      3.  No bone erosions are appreciated.      DX-FOOT-COMPLETE 3+ RIGHT   Final Result      1.  No radiographic evidence for osteomyelitis.      2.  Small calcaneal bone spurs.      DX-FOOT-COMPLETE 3+ LEFT   Final Result      1.  No radiographic evidence of osteomyelitis.      2.  Small calcaneal bone spur.           Assessment/Plan  * Heel pain, bilateral  Assessment & Plan  Previous recent history of cellulitis, at home she  "was on augmentin , however pain persisted so returned to ED  Initially on cefepime, vancomycin infectious disease consulted and discontinued on 11/8  Negative C. difficile testing discontinue oral vancomycin as well  Initially placed on fluconazole however discontinued given concern for possible toxicities  CT scan without evidence of acute infection  I wonder about possible plantar fasciitis vs chronic pain syndrome, will start NSAIDs (renal function normal) and consult PT/OT    History of atrial fibrillation and cardiomyopathy?  Assessment & Plan  Not on anticoagulation  On aspirin and ivadarbine  On LAsix with K supplementation  COntinue these medications   IAST treated with corlanor, pervious ablation for sinus node modification  Continue corlanor   Echo 11/8: EF 55%, normal echo    Immunocompromised (HCC)  Assessment & Plan  On Cellcept and prednisone for optic neuritis and \"poor immune system\".  Avoid probiotics.      Peripheral neuropathy and chornic pain syndrome (CMS-HCC)- (present on admission)  Assessment & Plan  On Lyrica and gabapentin  Continue RObaxin    Other specified hypothyroidism  Assessment & Plan  Continue Synthroid.    Diabetes type 2, controlled (HCC)  Assessment & Plan  Continue just ISSS   Resume trulicity tomorrow if still in the hospital   accucheck   Hypoglycemia protocol   Last a1c 6.0        VTE prophylaxis: lovenox       "

## 2019-11-09 NOTE — THERAPY
"Physical Therapy Evaluation completed.   Bed Mobility:  Supine to Sit: (received in chair)  Transfers: Sit to Stand: Minimal Assist  Gait: Level Of Assist: declined   Plan of Care: Will benefit from Physical Therapy 2 times per week  Discharge Recommendations: Equipment: Will Continue to Assess for Equipment Needs.     Pt presents with impaired activity tolerance, strength and pain associated with c/c of LE pain. Pt is well known to this therapist due to frequent admits over the past few years with a variety of c/c and inconsistencies (this is pt's 17th presentation including ER visits this year per chart review) ; bilateral LE pain is inconsistent, palpation and pain pattern not consistent with plantar fascitis, bilateral LE onset uncommon, pain usually is worse in AM upon first steps, pt describes constant hypersenstiviity throughout feet with spontaneous onset. Pt demonstrate inconsistencies in physical exam, during MMT poor effort with quad strength demo at 3-/5 but able to stand without knee buckling though did appear to purposefully tremor. Would recommend psych consult at this time; pt wishes to participate in placement/SNF; pt has w/c at home, home health and all equipment needed but feels incapable of caring for self; could dc home at w/c level with continued home health vs placement per medical team POC. Will follow.         See \"Rehab Therapy-Acute\" Patient Summary Report for complete documentation.     "

## 2019-11-09 NOTE — PROGRESS NOTES
2 RN skin check completed with Kin RN    Wound noted on left breast and abdomen,  Bruise noted on left buttock,  Cellulitis and callouses noted on BL heels,

## 2019-11-09 NOTE — PROGRESS NOTES
Assumed care; bedside report received from night shift RN. Pt on the monitor. Orders reviewed, call light within reach, and hourly rounding in place. POC addressed with patient, no additional questions at this time.

## 2019-11-10 LAB
GLUCOSE BLD-MCNC: 119 MG/DL (ref 65–99)
GLUCOSE BLD-MCNC: 123 MG/DL (ref 65–99)
GLUCOSE BLD-MCNC: 89 MG/DL (ref 65–99)
GLUCOSE BLD-MCNC: 90 MG/DL (ref 65–99)

## 2019-11-10 PROCEDURE — 770006 HCHG ROOM/CARE - MED/SURG/GYN SEMI*

## 2019-11-10 PROCEDURE — A9270 NON-COVERED ITEM OR SERVICE: HCPCS | Performed by: INTERNAL MEDICINE

## 2019-11-10 PROCEDURE — 700111 HCHG RX REV CODE 636 W/ 250 OVERRIDE (IP): Performed by: INTERNAL MEDICINE

## 2019-11-10 PROCEDURE — 99232 SBSQ HOSP IP/OBS MODERATE 35: CPT | Performed by: INTERNAL MEDICINE

## 2019-11-10 PROCEDURE — 700102 HCHG RX REV CODE 250 W/ 637 OVERRIDE(OP): Performed by: INTERNAL MEDICINE

## 2019-11-10 PROCEDURE — 82962 GLUCOSE BLOOD TEST: CPT | Mod: 91

## 2019-11-10 RX ORDER — DIPHENHYDRAMINE HCL 25 MG
25 TABLET ORAL ONCE
Status: COMPLETED | OUTPATIENT
Start: 2019-11-10 | End: 2019-11-10

## 2019-11-10 RX ORDER — GABAPENTIN 400 MG/1
400 CAPSULE ORAL 4 TIMES DAILY
Status: DISCONTINUED | OUTPATIENT
Start: 2019-11-10 | End: 2019-11-12 | Stop reason: HOSPADM

## 2019-11-10 RX ORDER — OXYCODONE HYDROCHLORIDE 5 MG/1
5 TABLET ORAL EVERY 6 HOURS PRN
Status: DISCONTINUED | OUTPATIENT
Start: 2019-11-10 | End: 2019-11-12 | Stop reason: HOSPADM

## 2019-11-10 RX ADMIN — POTASSIUM CHLORIDE 20 MEQ: 20 TABLET, EXTENDED RELEASE ORAL at 05:17

## 2019-11-10 RX ADMIN — ZIPRASIDONE HYDROCHLORIDE 80 MG: 80 CAPSULE ORAL at 05:17

## 2019-11-10 RX ADMIN — FOLIC ACID 1 MG: 1 TABLET ORAL at 05:16

## 2019-11-10 RX ADMIN — PREGABALIN 300 MG: 150 CAPSULE ORAL at 05:17

## 2019-11-10 RX ADMIN — OXYCODONE HYDROCHLORIDE 5 MG: 5 TABLET ORAL at 05:17

## 2019-11-10 RX ADMIN — BUSPIRONE HYDROCHLORIDE 10 MG: 10 TABLET ORAL at 16:57

## 2019-11-10 RX ADMIN — OXYCODONE HYDROCHLORIDE 5 MG: 5 TABLET ORAL at 15:35

## 2019-11-10 RX ADMIN — IBUPROFEN 600 MG: 600 TABLET ORAL at 13:15

## 2019-11-10 RX ADMIN — ENOXAPARIN SODIUM 40 MG: 100 INJECTION SUBCUTANEOUS at 05:16

## 2019-11-10 RX ADMIN — GABAPENTIN 400 MG: 400 CAPSULE ORAL at 20:04

## 2019-11-10 RX ADMIN — GABAPENTIN 300 MG: 300 CAPSULE ORAL at 11:38

## 2019-11-10 RX ADMIN — METHOCARBAMOL TABLETS 750 MG: 750 TABLET, COATED ORAL at 16:57

## 2019-11-10 RX ADMIN — PREDNISONE 10 MG: 10 TABLET ORAL at 05:17

## 2019-11-10 RX ADMIN — TRAZODONE HYDROCHLORIDE 100 MG: 100 TABLET ORAL at 16:57

## 2019-11-10 RX ADMIN — OXYCODONE HYDROCHLORIDE 5 MG: 5 TABLET ORAL at 12:13

## 2019-11-10 RX ADMIN — ZIPRASIDONE HYDROCHLORIDE 80 MG: 80 CAPSULE ORAL at 16:56

## 2019-11-10 RX ADMIN — METHOCARBAMOL TABLETS 750 MG: 750 TABLET, COATED ORAL at 13:15

## 2019-11-10 RX ADMIN — SODIUM BICARBONATE 650 MG: 650 TABLET ORAL at 16:57

## 2019-11-10 RX ADMIN — MYCOPHENOLATE MOFETIL 1000 MG: 250 CAPSULE ORAL at 16:57

## 2019-11-10 RX ADMIN — MYCOPHENOLATE MOFETIL 1000 MG: 250 CAPSULE ORAL at 05:17

## 2019-11-10 RX ADMIN — FLUOXETINE HYDROCHLORIDE 40 MG: 20 CAPSULE ORAL at 05:16

## 2019-11-10 RX ADMIN — LEVOTHYROXINE SODIUM 75 MCG: 75 TABLET ORAL at 05:17

## 2019-11-10 RX ADMIN — POTASSIUM CHLORIDE 20 MEQ: 20 TABLET, EXTENDED RELEASE ORAL at 16:57

## 2019-11-10 RX ADMIN — FUROSEMIDE 80 MG: 40 TABLET ORAL at 05:17

## 2019-11-10 RX ADMIN — TIOTROPIUM BROMIDE 1 CAPSULE: 18 CAPSULE ORAL; RESPIRATORY (INHALATION) at 05:17

## 2019-11-10 RX ADMIN — MONTELUKAST 10 MG: 10 TABLET, FILM COATED ORAL at 05:17

## 2019-11-10 RX ADMIN — SODIUM BICARBONATE 650 MG: 650 TABLET ORAL at 05:17

## 2019-11-10 RX ADMIN — DIPHENHYDRAMINE HCL 25 MG: 25 TABLET ORAL at 18:56

## 2019-11-10 RX ADMIN — SENNOSIDES AND DOCUSATE SODIUM 2 TABLET: 8.6; 5 TABLET ORAL at 05:17

## 2019-11-10 RX ADMIN — OXYCODONE HYDROCHLORIDE 5 MG: 5 TABLET ORAL at 08:53

## 2019-11-10 RX ADMIN — GABAPENTIN 400 MG: 400 CAPSULE ORAL at 16:57

## 2019-11-10 RX ADMIN — BUSPIRONE HYDROCHLORIDE 10 MG: 10 TABLET ORAL at 05:16

## 2019-11-10 RX ADMIN — METHOCARBAMOL TABLETS 750 MG: 750 TABLET, COATED ORAL at 05:17

## 2019-11-10 RX ADMIN — PREGABALIN 300 MG: 150 CAPSULE ORAL at 16:57

## 2019-11-10 RX ADMIN — OXYCODONE HYDROCHLORIDE 5 MG: 5 TABLET ORAL at 00:36

## 2019-11-10 ASSESSMENT — ENCOUNTER SYMPTOMS
MYALGIAS: 1
FALLS: 0
PALPITATIONS: 0
BLURRED VISION: 0
VOMITING: 0
HEARTBURN: 0
NAUSEA: 0
DIZZINESS: 0
SHORTNESS OF BREATH: 0
CHILLS: 0
ABDOMINAL PAIN: 0
FEVER: 0
DIARRHEA: 0
COUGH: 0
HEADACHES: 0

## 2019-11-10 NOTE — PROGRESS NOTES
"Hospital Medicine Daily Progress Note    Date of Service  11/10/2019    Chief Complaint  29 y.o. female admitted 11/6/2019 with foot pain     Hospital Course    29-year-old female with complicated history of borderline personality, schizophrenia, type 2 diabetes, morbid obesity,  IAST treated with corlanor, previous ablation for sinus node modification TONYA, optic neuritis and immunosuppression therapy presented with bilateral foot pain.  She was just discharged a week ago concerning for cellulitis and C. difficile.  She was prescribed Augmentin and vancomycin oral (c diff negative).  Pain was getting worse.  She was describing burning sensation.  Significant pain during touch.  No purulent discharges.  She usually goes barefoot and she does not wear footwear.  She has history of MRSA from breast abscess.  No leukocytosis.  Afebrile. No redness on following morning. significant pain on light touch. CT scan for further eval to rule out fracture. She was placed on cefepime and vancomycin. She has h/o oiana syndrome, but she had been able to tolerate slow infusion. ID consulted and did not think she had any cellulitis or infection and recommending stopping all antibiotics.         Interval Problem Update  - severe pain overnight, requested dilaudid  - lost IV today  - was able to pivot and move from commode to bed with only holding \"3 fingers\" from Rn  - talked with Rn (Marcos) who said patient was able to take normal steps from commode to bed earlier in the day then when Grandmother was there she was very shaky/weak  - still reports severe pain  - echo normal yesterday  - chest pain resolved  - tele normal without arrhythmias  - eating/drinking normally  - patient and grandmother (at bedside) do not think that she is safe at home and Grandmother currently unable to take care of her given weakness and instability and pain      Consultants/Specialty  ID   Psych    Code Status  Full code    Disposition  Inpatient, then " likely SNF vs HH  Can transfer to medical floor today    Review of Systems  Review of Systems   Constitutional: Negative for chills and fever.   Eyes: Negative for blurred vision.   Respiratory: Negative for cough and shortness of breath.    Cardiovascular: Negative for chest pain, palpitations and leg swelling.   Gastrointestinal: Negative for abdominal pain, diarrhea, heartburn, nausea and vomiting.   Genitourinary: Negative for urgency.   Musculoskeletal: Positive for joint pain and myalgias. Negative for falls.   Neurological: Negative for dizziness and headaches.        Physical Exam  Temp:  [36 °C (96.8 °F)-36.4 °C (97.5 °F)] 36.2 °C (97.1 °F)  Pulse:  [75-82] 82  Resp:  [16-17] 17  BP: (121-129)/(67-79) 123/79  SpO2:  [91 %-97 %] 93 %    Physical Exam  Vitals signs and nursing note reviewed.   Constitutional:       General: She is not in acute distress.     Appearance: Normal appearance. She is obese. She is not diaphoretic.   HENT:      Head: Normocephalic.      Comments: Coarse facial features and stigmata of Cushing's     Right Ear: External ear normal.      Left Ear: External ear normal.      Nose: No congestion.   Eyes:      Extraocular Movements: Extraocular movements intact.   Neck:      Musculoskeletal: Neck supple. No neck rigidity.   Cardiovascular:      Rate and Rhythm: Normal rate and regular rhythm.      Heart sounds: No murmur.   Pulmonary:      Effort: Pulmonary effort is normal. No respiratory distress.      Breath sounds: Normal breath sounds. No stridor. No wheezing or rhonchi.   Abdominal:      General: Abdomen is flat. There is no distension.      Palpations: Abdomen is soft. There is no mass.      Tenderness: There is no tenderness.      Hernia: No hernia is present.   Musculoskeletal:         General: Tenderness present. No swelling, deformity or signs of injury.      Comments: Painful when touching any part of her foot and heels (even very soft touch), no swelling, does have severe pain  with passive dorsiflexion   Skin:     Coloration: Skin is not jaundiced.   Neurological:      General: No focal deficit present.      Mental Status: She is alert and oriented to person, place, and time.   Psychiatric:         Mood and Affect: Mood normal.         Behavior: Behavior normal.         Fluids    Intake/Output Summary (Last 24 hours) at 11/10/2019 1527  Last data filed at 11/9/2019 1733  Gross per 24 hour   Intake 240 ml   Output 250 ml   Net -10 ml       Laboratory  Recent Labs     11/08/19  0014   WBC 6.1   RBC 4.31   HEMOGLOBIN 13.4   HEMATOCRIT 40.4   MCV 93.7   MCH 31.1   MCHC 33.2*   RDW 43.9   PLATELETCT 174   MPV 11.6     Recent Labs     11/08/19  0014   SODIUM 139   POTASSIUM 3.6   CHLORIDE 104   CO2 22   GLUCOSE 150*   BUN 8   CREATININE 0.88   CALCIUM 9.4             Recent Labs     11/08/19  0430   TRIGLYCERIDE 149   HDL 50   LDL 70       Imaging  EC-ECHOCARDIOGRAM COMPLETE W/O CONT   Final Result      DX-CHEST-PORTABLE (1 VIEW)   Final Result         1.  No acute cardiopulmonary disease.      CT-FOOT W/O PLUS RECONS RIGHT   Final Result      1.  No acute fracture or dislocation.      2.  No soft tissue fluid collection or abscess is appreciated.      3.  No bone erosions are identified.      CT-FOOT W/O PLUS RECONS LEFT   Final Result      1.  No acute fracture or dislocation.      2.  No soft tissue fluid collection or abscess is identified.      3.  No bone erosions are appreciated.      DX-FOOT-COMPLETE 3+ RIGHT   Final Result      1.  No radiographic evidence for osteomyelitis.      2.  Small calcaneal bone spurs.      DX-FOOT-COMPLETE 3+ LEFT   Final Result      1.  No radiographic evidence of osteomyelitis.      2.  Small calcaneal bone spur.           Assessment/Plan  * Heel pain, bilateral  Assessment & Plan  Previous recent history of cellulitis, at home she was on augmentin , however pain persisted so returned to ED  Initially on cefepime, vancomycin infectious disease consulted and  "discontinued on 11/8  Negative C. difficile testing discontinue oral vancomycin as well  Initially placed on fluconazole however discontinued given concern for possible toxicities  CT scan without evidence of acute infection  I wonder about possible chronic pain syndrome vs ideopathic peripheral neuropathy (DM very well controlled) vs conversion disorder (looking back in chart she is a long/strong history of conversion disorder)  - will start NSAIDs (renal function normal), wean off narcotics (IV out and will not replace), can increase gabapentin today  - consulted PT/OT who said patient at her baseline, has wheelchair at home, will place SNF referral given grandmother's concerns  - consult psych    History of atrial fibrillation and cardiomyopathy?  Assessment & Plan  Not on anticoagulation  On aspirin and ivadarbine  On LAsix with K supplementation  COntinue these medications   IAST treated with corlanor, pervious ablation for sinus node modification  Continue corlanor   Echo 11/8: EF 55%, normal echo    Immunocompromised (HCC)  Assessment & Plan  On Cellcept and prednisone for optic neuritis and \"poor immune system\".  Avoid probiotics.      Peripheral neuropathy and chornic pain syndrome (CMS-HCC)- (present on admission)  Assessment & Plan  On Lyrica and gabapentin, will increase gabapentin slightly and wean off dilaudid and oxycodone  Continue Robaxin    Other specified hypothyroidism  Assessment & Plan  Continue Synthroid.    Diabetes type 2, controlled (HCC)  Assessment & Plan  Continue just ISSS   Home regimen: trulicity  accucheck   Hypoglycemia protocol   Last a1c 6.0        VTE prophylaxis: lovenox       "

## 2019-11-11 LAB
BACTERIA BLD CULT: NORMAL
BACTERIA BLD CULT: NORMAL
GLUCOSE BLD-MCNC: 108 MG/DL (ref 65–99)
GLUCOSE BLD-MCNC: 114 MG/DL (ref 65–99)
GLUCOSE BLD-MCNC: 132 MG/DL (ref 65–99)
GLUCOSE BLD-MCNC: 139 MG/DL (ref 65–99)
SIGNIFICANT IND 70042: NORMAL
SIGNIFICANT IND 70042: NORMAL
SITE SITE: NORMAL
SITE SITE: NORMAL
SOURCE SOURCE: NORMAL
SOURCE SOURCE: NORMAL

## 2019-11-11 PROCEDURE — 99223 1ST HOSP IP/OBS HIGH 75: CPT | Performed by: PSYCHIATRY & NEUROLOGY

## 2019-11-11 PROCEDURE — 700102 HCHG RX REV CODE 250 W/ 637 OVERRIDE(OP): Performed by: INTERNAL MEDICINE

## 2019-11-11 PROCEDURE — 770006 HCHG ROOM/CARE - MED/SURG/GYN SEMI*

## 2019-11-11 PROCEDURE — A9270 NON-COVERED ITEM OR SERVICE: HCPCS | Performed by: INTERNAL MEDICINE

## 2019-11-11 PROCEDURE — 99232 SBSQ HOSP IP/OBS MODERATE 35: CPT | Performed by: INTERNAL MEDICINE

## 2019-11-11 PROCEDURE — 700111 HCHG RX REV CODE 636 W/ 250 OVERRIDE (IP): Performed by: INTERNAL MEDICINE

## 2019-11-11 PROCEDURE — 700102 HCHG RX REV CODE 250 W/ 637 OVERRIDE(OP): Performed by: NURSE PRACTITIONER

## 2019-11-11 PROCEDURE — A9270 NON-COVERED ITEM OR SERVICE: HCPCS | Performed by: NURSE PRACTITIONER

## 2019-11-11 PROCEDURE — 82962 GLUCOSE BLOOD TEST: CPT | Mod: 91

## 2019-11-11 RX ORDER — OMEPRAZOLE 20 MG/1
20 CAPSULE, DELAYED RELEASE ORAL DAILY
Status: DISCONTINUED | OUTPATIENT
Start: 2019-11-11 | End: 2019-11-12 | Stop reason: HOSPADM

## 2019-11-11 RX ORDER — IBUPROFEN 600 MG/1
600 TABLET ORAL 3 TIMES DAILY
Status: DISCONTINUED | OUTPATIENT
Start: 2019-11-11 | End: 2019-11-12 | Stop reason: HOSPADM

## 2019-11-11 RX ADMIN — OXYCODONE HYDROCHLORIDE 5 MG: 5 TABLET ORAL at 15:45

## 2019-11-11 RX ADMIN — PREGABALIN 300 MG: 150 CAPSULE ORAL at 17:36

## 2019-11-11 RX ADMIN — GABAPENTIN 400 MG: 400 CAPSULE ORAL at 17:37

## 2019-11-11 RX ADMIN — GABAPENTIN 400 MG: 400 CAPSULE ORAL at 20:04

## 2019-11-11 RX ADMIN — ACETAMINOPHEN 650 MG: 325 TABLET, FILM COATED ORAL at 13:45

## 2019-11-11 RX ADMIN — PREDNISONE 10 MG: 10 TABLET ORAL at 04:59

## 2019-11-11 RX ADMIN — OXYCODONE HYDROCHLORIDE 5 MG: 5 TABLET ORAL at 21:31

## 2019-11-11 RX ADMIN — ACETAMINOPHEN 650 MG: 325 TABLET, FILM COATED ORAL at 20:04

## 2019-11-11 RX ADMIN — SODIUM BICARBONATE 650 MG: 650 TABLET ORAL at 17:36

## 2019-11-11 RX ADMIN — SODIUM BICARBONATE 650 MG: 650 TABLET ORAL at 04:59

## 2019-11-11 RX ADMIN — POTASSIUM CHLORIDE 20 MEQ: 20 TABLET, EXTENDED RELEASE ORAL at 04:59

## 2019-11-11 RX ADMIN — LEVOTHYROXINE SODIUM 75 MCG: 75 TABLET ORAL at 04:59

## 2019-11-11 RX ADMIN — OXYCODONE HYDROCHLORIDE 5 MG: 5 TABLET ORAL at 08:59

## 2019-11-11 RX ADMIN — FOLIC ACID 1 MG: 1 TABLET ORAL at 04:59

## 2019-11-11 RX ADMIN — IBUPROFEN 600 MG: 600 TABLET ORAL at 17:36

## 2019-11-11 RX ADMIN — TRAZODONE HYDROCHLORIDE 100 MG: 100 TABLET ORAL at 17:37

## 2019-11-11 RX ADMIN — ZIPRASIDONE HYDROCHLORIDE 80 MG: 80 CAPSULE ORAL at 04:59

## 2019-11-11 RX ADMIN — PREGABALIN 300 MG: 150 CAPSULE ORAL at 04:59

## 2019-11-11 RX ADMIN — GABAPENTIN 400 MG: 400 CAPSULE ORAL at 13:45

## 2019-11-11 RX ADMIN — METHOCARBAMOL TABLETS 750 MG: 750 TABLET, COATED ORAL at 11:06

## 2019-11-11 RX ADMIN — IBUPROFEN 600 MG: 600 TABLET ORAL at 11:06

## 2019-11-11 RX ADMIN — FLUOXETINE HYDROCHLORIDE 40 MG: 20 CAPSULE ORAL at 04:59

## 2019-11-11 RX ADMIN — MONTELUKAST 10 MG: 10 TABLET, FILM COATED ORAL at 04:59

## 2019-11-11 RX ADMIN — ALBUTEROL SULFATE 2 PUFF: 90 AEROSOL, METERED RESPIRATORY (INHALATION) at 11:02

## 2019-11-11 RX ADMIN — POTASSIUM CHLORIDE 20 MEQ: 20 TABLET, EXTENDED RELEASE ORAL at 17:37

## 2019-11-11 RX ADMIN — GABAPENTIN 400 MG: 400 CAPSULE ORAL at 08:59

## 2019-11-11 RX ADMIN — OMEPRAZOLE 20 MG: 20 CAPSULE, DELAYED RELEASE ORAL at 11:06

## 2019-11-11 RX ADMIN — METHOCARBAMOL TABLETS 750 MG: 750 TABLET, COATED ORAL at 04:59

## 2019-11-11 RX ADMIN — OXYCODONE HYDROCHLORIDE 5 MG: 5 TABLET ORAL at 01:25

## 2019-11-11 RX ADMIN — BUSPIRONE HYDROCHLORIDE 10 MG: 10 TABLET ORAL at 17:37

## 2019-11-11 RX ADMIN — FUROSEMIDE 80 MG: 40 TABLET ORAL at 04:59

## 2019-11-11 RX ADMIN — BUSPIRONE HYDROCHLORIDE 10 MG: 10 TABLET ORAL at 05:06

## 2019-11-11 RX ADMIN — ENOXAPARIN SODIUM 40 MG: 100 INJECTION SUBCUTANEOUS at 05:00

## 2019-11-11 RX ADMIN — TIOTROPIUM BROMIDE 1 CAPSULE: 18 CAPSULE ORAL; RESPIRATORY (INHALATION) at 05:00

## 2019-11-11 RX ADMIN — METHOCARBAMOL TABLETS 750 MG: 750 TABLET, COATED ORAL at 17:37

## 2019-11-11 RX ADMIN — ZIPRASIDONE HYDROCHLORIDE 80 MG: 80 CAPSULE ORAL at 17:37

## 2019-11-11 RX ADMIN — MYCOPHENOLATE MOFETIL 1000 MG: 250 CAPSULE ORAL at 04:59

## 2019-11-11 RX ADMIN — MYCOPHENOLATE MOFETIL 1000 MG: 250 CAPSULE ORAL at 17:36

## 2019-11-11 ASSESSMENT — ENCOUNTER SYMPTOMS
NERVOUS/ANXIOUS: 0
PALPITATIONS: 0
HEADACHES: 0
FEVER: 0
DIZZINESS: 0
FALLS: 0
INSOMNIA: 0
SPEECH CHANGE: 0
WHEEZING: 0
NAUSEA: 0
CHILLS: 0
DEPRESSION: 0
VOMITING: 0
MYALGIAS: 0
SENSORY CHANGE: 0
SHORTNESS OF BREATH: 0
ABDOMINAL PAIN: 0
TINGLING: 1
COUGH: 0

## 2019-11-11 NOTE — ASSESSMENT & PLAN NOTE
-Chronic & stable. On her home level of O2.   -Continue home spiriva, singulair, and neb treatments.

## 2019-11-11 NOTE — CARE PLAN
Problem: Communication  Goal: The ability to communicate needs accurately and effectively will improve  Outcome: PROGRESSING AS EXPECTED     Problem: Safety  Goal: Will remain free from injury  Outcome: PROGRESSING AS EXPECTED     Problem: Venous Thromboembolism (VTW)/Deep Vein Thrombosis (DVT) Prevention:  Goal: Patient will participate in Venous Thrombosis (VTE)/Deep Vein Thrombosis (DVT)Prevention Measures  Outcome: PROGRESSING AS EXPECTED     Problem: Fluid Volume:  Goal: Will maintain balanced intake and output  Outcome: PROGRESSING AS EXPECTED     Problem: Discharge Barriers/Planning  Goal: Patient's continuum of care needs will be met  Outcome: PROGRESSING SLOWER THAN EXPECTED     Problem: Pain Management  Goal: Pain level will decrease to patient's comfort goal  Outcome: PROGRESSING SLOWER THAN EXPECTED  Intervention: Follow pain managment plan developed in collaboration with patient and Interdisciplinary Team  Note:   Pt needs education to establish proper pain management goals given comorbidities      Problem: Mobility  Goal: Risk for activity intolerance will decrease  Outcome: PROGRESSING SLOWER THAN EXPECTED  Intervention: Assess and monitor signs of activity intolerance  Note:   Pt to increase activity and ambulatory distance by 15ft in order to ambulate to the restroom

## 2019-11-11 NOTE — PROGRESS NOTES
Received report from SONYA Rodríguez. Pt arrived on the unit/ Full assessment complete. Oriented pt to the unit. All needs met at this time per pt

## 2019-11-11 NOTE — PROGRESS NOTES
Hospital Medicine Daily Progress Note    Date of Service  11/11/2019    Chief Complaint  Heel pain    Hospital Course   Ms. Balderrama is a chronically ill 29-year-old female well known to our service due to frequent admissions who has a history of borderline personality disorder, schizophrenia, type 2 diabetes, morbid obesity, IAST treated with corlanor, previous ablation for sinus node modification, TONYA, optic neuritis on immunosuppression therapy who presented to the ED on 11/6/19 with bilateral heel pain.  She was just discharged a week ago after having concern for cellulitis and C. difficile.  She was prescribed augmentin and vancomycin oral (c diff negative). However, her pain was getting worse. On exam she had significant pain to the touch, but no purulent drainage. Of significance, she usually walks around barefoot. 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 She was placed on cefepime and vancomycin pending ID consultation, but they did not think she had cellulitis or infection and recommended stopping all antibiotics. She was monitored on telemetry due to her cardiac history and chronic chest pain, where no arrhythmias were noted, troponins were negative, and her echocardiogram was normal. Her grandmother is her known support system and primary caregiver, though she feels she is currently unable to care for the patient. Psychiatry was consulted on 11/10/19 due to concern for conversion disorder.        Interval Problem Update  Still complaining of bilateral foot pain, all the way around, more prominent in the heel areas, worse with putting any pressure on them. Review of imaging showed bilateral calcaneal bone spurs. Started on scheduled NSAIDS today. There was no evidence of osteomyelitis, abscess, or fracture.   She otherwise feels good. Is concerned that her grandmother won't be able to take care of her at  "home, though PT/OT are recommending home with home health.     No labs done today.     Tmax 99F, HR 70s, SBP teens, O2 sats WNL on 2 LPM of O2 via NC.     Consultants/Specialty  Infectious disease  Psychiatry    Code Status  Full code    Disposition  PT/OT recommending HH. Pt wants SNF. Refused [facility] rec.   Psych consult still pending.     Review of Systems  Review of Systems   Constitutional: Positive for malaise/fatigue (\"I'm tired all the time\"). Negative for chills and fever.   HENT: Negative for congestion.    Respiratory: Negative for cough, shortness of breath and wheezing.    Cardiovascular: Negative for chest pain, palpitations and leg swelling.   Gastrointestinal: Negative for abdominal pain, nausea and vomiting.   Genitourinary: Negative for dysuria and urgency.   Musculoskeletal: Negative for falls, joint pain and myalgias.        + bilateral heel pain   Neurological: Positive for tingling (BLE, chronic, currently at baseline). Negative for dizziness, sensory change, speech change and headaches.   Psychiatric/Behavioral: Negative for depression. The patient is not nervous/anxious and does not have insomnia (hypersomnia).       Physical Exam  Temp:  [36.1 °C (96.9 °F)-37.2 °C (99 °F)] 36.1 °C (97 °F)  Pulse:  [73-83] 78  Resp:  [14-18] 15  BP: (110-132)/(65-84) 119/77  SpO2:  [94 %-96 %] 96 %    Physical Exam  Vitals signs and nursing note reviewed.   Constitutional:       General: She is sleeping. She is not in acute distress.     Appearance: Normal appearance. She is morbidly obese. She is ill-appearing (chronically ill-appearing).      Interventions: Nasal cannula in place.      Comments: On 2 LPM of O2 via NC.    HENT:      Head: Normocephalic and atraumatic.      Right Ear: External ear normal.      Left Ear: External ear normal.      Nose: No congestion.   Neck:      Musculoskeletal: Normal range of motion and neck supple.   Cardiovascular:      Rate and Rhythm: Normal rate and regular rhythm.      " Heart sounds: Normal heart sounds.   Pulmonary:      Effort: Pulmonary effort is normal. No respiratory distress.      Breath sounds: Normal breath sounds.   Abdominal:      General: Bowel sounds are normal.      Palpations: Abdomen is soft.      Tenderness: There is no tenderness.   Musculoskeletal:         General: Tenderness present. No swelling, deformity or signs of injury.      Comments: Painful when touching any part of her foot and heels (even very soft touch), no swelling, does have severe pain with passive dorsiflexion   Skin:     General: Skin is warm and dry.      Coloration: Skin is pale.   Neurological:      General: No focal deficit present.      Mental Status: She is oriented to person, place, and time and easily aroused.      GCS: GCS eye subscore is 4. GCS verbal subscore is 5. GCS motor subscore is 6.   Psychiatric:         Attention and Perception: Attention and perception normal.         Mood and Affect: Mood normal.         Speech: Speech normal.         Behavior: Behavior normal. Behavior is cooperative.         Cognition and Memory: Cognition and memory normal.         Judgment: Judgment normal.     Fluids    Intake/Output Summary (Last 24 hours) at 11/11/2019 1006  Last data filed at 11/11/2019 0443  Gross per 24 hour   Intake 120 ml   Output no documentation   Net 120 ml     Laboratory    Imaging  EC-ECHOCARDIOGRAM COMPLETE W/O CONT   Final Result      DX-CHEST-PORTABLE (1 VIEW)   Final Result         1.  No acute cardiopulmonary disease.      CT-FOOT W/O PLUS RECONS RIGHT   Final Result      1.  No acute fracture or dislocation.      2.  No soft tissue fluid collection or abscess is appreciated.      3.  No bone erosions are identified.      CT-FOOT W/O PLUS RECONS LEFT   Final Result      1.  No acute fracture or dislocation.      2.  No soft tissue fluid collection or abscess is identified.      3.  No bone erosions are appreciated.      DX-FOOT-COMPLETE 3+ RIGHT   Final Result      1.   "No radiographic evidence for osteomyelitis.      2.  Small calcaneal bone spurs.      DX-FOOT-COMPLETE 3+ LEFT   Final Result      1.  No radiographic evidence of osteomyelitis.      2.  Small calcaneal bone spur.         Assessment/Plan  * Heel pain, bilateral- (present on admission)  Assessment & Plan  -Review of imaging revealed bilateral calcaneal bone spurs only. No abscess, fracture, or osteomyelitis.   -Started scheduled NSAIDS. Gabapentin increased yesterday by previous MD. Use narcotics only for breakthrough pain. Avoid IV narcs.   -Will need continued PT to help with relief.    Chronic respiratory failure with hypoxia, on home oxygen therapy (HCC)- (present on admission)  Assessment & Plan  -Secondary to uncontrolled asthma per patient report. Is on 2-4 LPM at home at baseline.   -Continue I.S. while hospitalized.   -Ambulate as tolerated, please encourage.     History of atrial fibrillation and cardiomyopathy?- (present on admission)  Assessment & Plan  -No on therapeutic AC at home.   -Can be induced by corlanor. Will need outpatient follow up for this.   -Continue home furosemide with potassium supplementation.   -Echo 11/8/19: EF 55%, normal echo.    Immunocompromised (HCC)- (present on admission)  Assessment & Plan  -On cellcept and prednisone for optic neuritis and \"poor immune system\".  -WBC normal today.     Moderate intermittent asthma without complication- (present on admission)  Assessment & Plan  -Chronic & stable. On her home level of O2.   -Continue home spiriva, singulair, and neb treatments.     Acquired hypothyroidism- (present on admission)  Assessment & Plan  -Chronic & stable.   -Continue home levothyroxine.     Diabetes type 2, controlled ([facility])- (present on admission)  Assessment & Plan  -Maintained on trulicity at home.   -Has not required insulin this admission. SSI & accuchecks therefore discontinued today.   -A1C 2 months ago was 6%.  -Continue treatment for neuropathy. "     Schizophrenia (HCC)- (present on admission)  Assessment & Plan  -Continue home geodon & buspar.   -Psych eval pending.     GERD (gastroesophageal reflux disease)- (present on admission)  Assessment & Plan  -Started omeprazole since she has a history of GERD & was started on scheduled NSAIDS today.     Peripheral neuropathy and chornic pain syndrome (CMS-HCC)- (present on admission)  Assessment & Plan  -Continue home lyrica & gabapentin. Gabapentin dose increased by previous MD.   -Continue home robaxin.    Borderline personality disorder in adult (HCC)- (present on admission)  Assessment & Plan  -Continue geodon & buspar.   -Psych consulted by previous MD.     VTE prophylaxis: Lovenox       Electronically signed by:  Cassy Olmedo, MSN, RN, APRN, ACNPC-AG, CCRN  Nurse Practitioner, [facility]  Work # [phone]  Cell # [phone] (call, text, or tiger text)    11/11/2019    10:11 AM
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
## 2019-11-11 NOTE — PROGRESS NOTES
Report called to receiving RN on [id number], transport at bedside, chart and medications sent with patient.

## 2019-11-11 NOTE — ASSESSMENT & PLAN NOTE
-Secondary to uncontrolled asthma per patient report. Is on 2-4 LPM at home at baseline.   -Continue I.S. while hospitalized.   -Ambulate as tolerated, please encourage.

## 2019-11-11 NOTE — DISCHARGE PLANNING
Care Transition Team Assessment  Anticipated Discharge Disposition: Home with Home Health    Action: Met with patient to discuss discharge plans, patient states she lives with her Grandparents and has a wheelchair at home, has oxygen with [employer] and is current with [facility]. Choice form signed for [facility] and faxed to [facility].     Barriers to Discharge: medical clearance and HH re-acceptance.     Plan: Home with Home Health     Information Source  Orientation : Oriented x 4  Information Given By: Patient  Informant's Name: Kristin  Who is responsible for making decisions for patient? : Patient         Elopement Risk  Legal Hold: No  Ambulatory or Self Mobile in Wheelchair: No-Not an Elopement Risk  Disoriented: No  Psychiatric Symptoms: None  History of Wandering: No  Elopement this Admit: No  Vocalizing Wanting to Leave: No  Displays Behaviors, Body Language Wanting to Leave: No-Not at Risk for Elopement  Elopement Risk: Not at Risk for Elopement    Interdisciplinary Discharge Planning  Does Admitting Nurse Feel This Could be a Complex Discharge?: No  Primary Care Physician: Torres Brody  Lives with - Patient's Self Care Capacity: Other (Comments)(grandmother)  Patient or legal guardian wants to designate a caregiver (see row info): No  Support Systems: Family Member(s)  Housing / Facility: [address]  Do You Take your Prescribed Medications Regularly: Yes  Able to Return to Previous ADL's: Future Time w/Therapy  Mobility Issues: Yes  Prior Services: Skilled Home Health Services  Patient Expects to be Discharged to:: Home  Assistance Needed: No  Durable Medical Equipment: Home Oxygen, Walker, Other - Specify(Cane and wheelchair)    Discharge Preparedness  What is your plan after discharge?: Home health care  What are your discharge supports?: Grandparent  Prior Functional Level: Needs Assist with Activities of Daily Living    Functional Assesment  Prior Functional Level: Needs Assist with Activities of Daily  Living    Finances  Financial Barriers to Discharge: No  Prescription Coverage: Yes    Vision / Hearing Impairment  Vision Impairment : Yes  Right Eye Vision: Wears Glasses  Left Eye Vision: Wears Glasses  Hearing Impairment : No  Does Pt Need Special Equipment for the Hearing Impaired?: No              Domestic Abuse  Have you ever been the victim of abuse or violence?: No  Physical Abuse or Sexual Abuse: No  Verbal Abuse or Emotional Abuse: No  Possible Abuse Reported to:: Not Applicable    Psychological Assessment  History of Substance Abuse: None  History of Psychiatric Problems: Yes  Newly Diagnosed Illness: No    Discharge Risks or Barriers  Patient risk factors: Complex medical needs    Anticipated Discharge Information  Anticipated discharge disposition: [facility]  Discharge Address: [address] [place] [address] [place] Iverson  Discharge Contact Phone Number: [phone]

## 2019-11-11 NOTE — CARE PLAN
Problem: Communication  Goal: The ability to communicate needs accurately and effectively will improve  Outcome: PROGRESSING AS EXPECTED  Note:   A/Ox4. Able to make needs known.      Problem: Safety  Goal: Will remain free from injury  Outcome: PROGRESSING AS EXPECTED  Note:   Pt calls appropriately. Gait is very unsteady. Pt is labile with her gait and how she ambulates.      Problem: Bowel/Gastric:  Goal: Normal bowel function is maintained or improved  Outcome: PROGRESSING AS EXPECTED  Note:   BM x2 thus far. Soft large. Denies constipation or bloating.      Problem: Fluid Volume:  Goal: Will maintain balanced intake and output  Outcome: PROGRESSING AS EXPECTED  Note:   No IVF. Drinks just coke does not want water. Adequate PO intake regarding food.      Problem: Pain Management  Goal: Pain level will decrease to patient's comfort goal  Outcome: PROGRESSING AS EXPECTED  Note:   Requests pain medication fx. Scheduled motrin, gabapentin, robaxin, lyrica on board. Provided as charted in emar. PRN oxycodone and tylenol provided. C/O pain is to bilat feet.      Problem: Mobility  Goal: Risk for activity intolerance will decrease  Outcome: PROGRESSING AS EXPECTED  Note:   Pt does jerking movements throughout body when ambulating. When standing she jumps up, rather than a pushing up on the bed with arms. Unsteady gait. FWW x1 person pivot tx to commode.      Problem: Skin Integrity  Goal: Risk for impaired skin integrity will decrease  Outcome: PROGRESSING AS EXPECTED  Note:   Repositions self in bed independently.      Problem: Respiratory:  Goal: Respiratory status will improve  Outcome: PROGRESSING AS EXPECTED  Note:   Denies SOB. LSCL. O2 sats >90% on 2L.     1300: Pt stated increased SOB. O2 sats ready 98-99%. Albuterol inhaler provided. N/C increased to 3L. Pt states relief.      Problem: Psychosocial Needs:  Goal: Level of anxiety will decrease  Outcome: PROGRESSING AS EXPECTED  Note:   Pt vocalizing wanting  to go to SNF rather than home.      Problem: Urinary Elimination:  Goal: Ability to reestablish a normal urinary elimination pattern will improve  Outcome: PROGRESSING AS EXPECTED  Note:   Voiding appropriately.       no

## 2019-11-11 NOTE — CARE PLAN
Problem: Safety  Goal: Will remain free from injury  Outcome: PROGRESSING AS EXPECTED  Goal: Will remain free from falls  Outcome: PROGRESSING AS EXPECTED   Pt uses call light approprietly.   Problem: Pain Management  Goal: Pain level will decrease to patient's comfort goal  Outcome: PROGRESSING AS EXPECTED  Pt has minimal  pain or need for pain meds.

## 2019-11-11 NOTE — DISCHARGE PLANNING
Received Choice form at 1130  Agency/Facility Name: [facility]   Referral sent per Choice form at 1140

## 2019-11-11 NOTE — PROGRESS NOTES
"Pt c/o generalized skin irritation and itchiness \"all over\". Assessed, no visible redness noted. Dicussed with . One time dose ordered from Benadryl.    "

## 2019-11-11 NOTE — PROGRESS NOTES
2 RN Skin Check    2 RN skin check complete.   Devices in place:N/A.  Skin assessed under devices: N/A.  Confirmed pressure ulcers found on: N/A.  New potential pressure ulcers noted on N/A. Wound consult placed NA.  The following interventions in place: pillows for repositioning, pt turns from side to side independently.    PT has 2 scabs to right side of abdomen, Bilat heel redness/callous, small abrasion under left breast.

## 2019-11-11 NOTE — FACE TO FACE
Face to Face Supporting Documentation - Home Health    The encounter with this patient was in whole or in part the primary reason for home health admission.    Date of encounter:   Patient:                    MRN:                       YOB: 2020  Kristin Balderrama  [id number]  1990     Home health to see patient for:  Skilled Nursing care for assessment, interventions & education, Physical Therapy evaluation and treatment and Occupational therapy evaluation and treatment    Skilled need for:  New Onset Medical Diagnosis Bilateral heel pain    Skilled nursing interventions to include:  Comment: PT/OT    Homebound status evidenced by:  Need the aid of supportive devices such as crutches, canes, wheelchairs or walkers, Require the use of special transportation or Needs the assistance of another person in order to leave the home. Leaving home requires a considerable and taxing effort. There is a normal inability to leave the home.    Community Physician to provide follow up care: Torres Brody M.D.     Optional Interventions? No      I certify the face to face encounter for this home health care referral meets the CMS requirements and the encounter/clinical assessment with the patient was, in whole, or in part, for the medical condition(s) listed above, which is the primary reason for home health care. Based on my clinical findings: the service(s) are medically necessary, support the need for home health care, and the homebound criteria are met.  I certify that this patient has had a face to face encounter by myself.  VANIA Frost - NPI: [id number]

## 2019-11-11 NOTE — DISCHARGE PLANNING
Agency/Facility Name: [facility]  Spoke To: Nilsa  Outcome: Patient resumption of care, patient accepted.

## 2019-11-12 VITALS
HEIGHT: 65 IN | OXYGEN SATURATION: 95 % | HEART RATE: 89 BPM | DIASTOLIC BLOOD PRESSURE: 81 MMHG | SYSTOLIC BLOOD PRESSURE: 119 MMHG | BODY MASS INDEX: 45.55 KG/M2 | WEIGHT: 273.37 LBS | TEMPERATURE: 97.5 F | RESPIRATION RATE: 16 BRPM

## 2019-11-12 LAB
ANION GAP SERPL CALC-SCNC: 9 MMOL/L (ref 0–11.9)
BASOPHILS # BLD AUTO: 0.4 % (ref 0–1.8)
BASOPHILS # BLD: 0.03 K/UL (ref 0–0.12)
BUN SERPL-MCNC: 15 MG/DL (ref 8–22)
CALCIUM SERPL-MCNC: 9.2 MG/DL (ref 8.5–10.5)
CHLORIDE SERPL-SCNC: 102 MMOL/L (ref 96–112)
CO2 SERPL-SCNC: 25 MMOL/L (ref 20–33)
CREAT SERPL-MCNC: 0.89 MG/DL (ref 0.5–1.4)
EOSINOPHIL # BLD AUTO: 0.09 K/UL (ref 0–0.51)
EOSINOPHIL NFR BLD: 1.2 % (ref 0–6.9)
ERYTHROCYTE [DISTWIDTH] IN BLOOD BY AUTOMATED COUNT: 41.5 FL (ref 35.9–50)
GLUCOSE BLD-MCNC: 101 MG/DL (ref 65–99)
GLUCOSE SERPL-MCNC: 116 MG/DL (ref 65–99)
HCT VFR BLD AUTO: 38.7 % (ref 37–47)
HGB BLD-MCNC: 13.2 G/DL (ref 12–16)
IMM GRANULOCYTES # BLD AUTO: 0.03 K/UL (ref 0–0.11)
IMM GRANULOCYTES NFR BLD AUTO: 0.4 % (ref 0–0.9)
LYMPHOCYTES # BLD AUTO: 2.46 K/UL (ref 1–4.8)
LYMPHOCYTES NFR BLD: 33.4 % (ref 22–41)
MAGNESIUM SERPL-MCNC: 2 MG/DL (ref 1.5–2.5)
MCH RBC QN AUTO: 31.3 PG (ref 27–33)
MCHC RBC AUTO-ENTMCNC: 34.1 G/DL (ref 33.6–35)
MCV RBC AUTO: 91.7 FL (ref 81.4–97.8)
MONOCYTES # BLD AUTO: 0.57 K/UL (ref 0–0.85)
MONOCYTES NFR BLD AUTO: 7.7 % (ref 0–13.4)
NEUTROPHILS # BLD AUTO: 4.18 K/UL (ref 2–7.15)
NEUTROPHILS NFR BLD: 56.9 % (ref 44–72)
NRBC # BLD AUTO: 0 K/UL
NRBC BLD-RTO: 0 /100 WBC
PLATELET # BLD AUTO: 184 K/UL (ref 164–446)
PMV BLD AUTO: 11.9 FL (ref 9–12.9)
POTASSIUM SERPL-SCNC: 3.9 MMOL/L (ref 3.6–5.5)
RBC # BLD AUTO: 4.22 M/UL (ref 4.2–5.4)
SODIUM SERPL-SCNC: 136 MMOL/L (ref 135–145)
WBC # BLD AUTO: 7.4 K/UL (ref 4.8–10.8)

## 2019-11-12 PROCEDURE — 700102 HCHG RX REV CODE 250 W/ 637 OVERRIDE(OP): Performed by: INTERNAL MEDICINE

## 2019-11-12 PROCEDURE — 36415 COLL VENOUS BLD VENIPUNCTURE: CPT

## 2019-11-12 PROCEDURE — A9270 NON-COVERED ITEM OR SERVICE: HCPCS | Performed by: INTERNAL MEDICINE

## 2019-11-12 PROCEDURE — A9270 NON-COVERED ITEM OR SERVICE: HCPCS | Performed by: NURSE PRACTITIONER

## 2019-11-12 PROCEDURE — 83735 ASSAY OF MAGNESIUM: CPT

## 2019-11-12 PROCEDURE — 80048 BASIC METABOLIC PNL TOTAL CA: CPT

## 2019-11-12 PROCEDURE — 85025 COMPLETE CBC W/AUTO DIFF WBC: CPT

## 2019-11-12 PROCEDURE — 99239 HOSP IP/OBS DSCHRG MGMT >30: CPT | Performed by: HOSPITALIST

## 2019-11-12 PROCEDURE — 82962 GLUCOSE BLOOD TEST: CPT

## 2019-11-12 PROCEDURE — 700111 HCHG RX REV CODE 636 W/ 250 OVERRIDE (IP): Performed by: INTERNAL MEDICINE

## 2019-11-12 PROCEDURE — 700102 HCHG RX REV CODE 250 W/ 637 OVERRIDE(OP): Performed by: NURSE PRACTITIONER

## 2019-11-12 RX ORDER — FLUOXETINE HYDROCHLORIDE 40 MG/1
40 CAPSULE ORAL DAILY
Start: 2019-11-13 | End: 2019-11-30

## 2019-11-12 RX ORDER — OXYCODONE HYDROCHLORIDE 5 MG/1
5 TABLET ORAL EVERY 8 HOURS PRN
Qty: 9 TAB | Refills: 0 | Status: SHIPPED | OUTPATIENT
Start: 2019-11-12 | End: 2019-11-15

## 2019-11-12 RX ORDER — IBUPROFEN 600 MG/1
600 TABLET ORAL 3 TIMES DAILY
Qty: 21 TAB | Refills: 0 | Status: SHIPPED | OUTPATIENT
Start: 2019-11-12 | End: 2019-11-19

## 2019-11-12 RX ADMIN — OXYCODONE HYDROCHLORIDE 5 MG: 5 TABLET ORAL at 05:57

## 2019-11-12 RX ADMIN — PREGABALIN 300 MG: 150 CAPSULE ORAL at 05:57

## 2019-11-12 RX ADMIN — ENOXAPARIN SODIUM 40 MG: 100 INJECTION SUBCUTANEOUS at 05:59

## 2019-11-12 RX ADMIN — POTASSIUM CHLORIDE 20 MEQ: 20 TABLET, EXTENDED RELEASE ORAL at 05:57

## 2019-11-12 RX ADMIN — LEVOTHYROXINE SODIUM 75 MCG: 75 TABLET ORAL at 05:57

## 2019-11-12 RX ADMIN — FLUOXETINE HYDROCHLORIDE 40 MG: 20 CAPSULE ORAL at 05:59

## 2019-11-12 RX ADMIN — FOLIC ACID 1 MG: 1 TABLET ORAL at 05:58

## 2019-11-12 RX ADMIN — PREDNISONE 10 MG: 10 TABLET ORAL at 05:59

## 2019-11-12 RX ADMIN — MONTELUKAST 10 MG: 10 TABLET, FILM COATED ORAL at 05:59

## 2019-11-12 RX ADMIN — ZIPRASIDONE HYDROCHLORIDE 80 MG: 80 CAPSULE ORAL at 05:59

## 2019-11-12 RX ADMIN — GABAPENTIN 400 MG: 400 CAPSULE ORAL at 09:54

## 2019-11-12 RX ADMIN — FUROSEMIDE 80 MG: 40 TABLET ORAL at 05:58

## 2019-11-12 RX ADMIN — MYCOPHENOLATE MOFETIL 1000 MG: 250 CAPSULE ORAL at 05:58

## 2019-11-12 RX ADMIN — TIOTROPIUM BROMIDE 1 CAPSULE: 18 CAPSULE ORAL; RESPIRATORY (INHALATION) at 06:32

## 2019-11-12 RX ADMIN — BUSPIRONE HYDROCHLORIDE 10 MG: 10 TABLET ORAL at 05:59

## 2019-11-12 RX ADMIN — SODIUM BICARBONATE 650 MG: 650 TABLET ORAL at 05:58

## 2019-11-12 RX ADMIN — METHOCARBAMOL TABLETS 750 MG: 750 TABLET, COATED ORAL at 05:58

## 2019-11-12 RX ADMIN — IBUPROFEN 600 MG: 600 TABLET ORAL at 05:59

## 2019-11-12 RX ADMIN — OMEPRAZOLE 20 MG: 20 CAPSULE, DELAYED RELEASE ORAL at 05:59

## 2019-11-12 NOTE — DISCHARGE INSTRUCTIONS
Discharge Instructions per Kena Pritchard M.D.  Follow-up with primary care provider within 7 days.  DIAGNOSIS: Bilateral heel pain secondary to calcaneal bone spurs   Return to ER if you develop any of the following symptoms fever, chills, weakness, not feeling well, rash, bleeding, blurred vision, palpitations, cough, pain in any part of your body, shortness of breath, nausea, vomiting, diarrhea, difficulty with urination, dizziness, headache, seizures, loss of consciousness or if you develop any new symptoms.         -Take all medications as prescribed   -Drink lots of water while on ibuprofen   -Follow up with your PCP within 1 week of hospital discharge   -Follow up with psychiatry after discharge. Inpatient psychiatry recommending changing your medications from Geodon to Risperidone due to having a prolonged QTc interval.   -Resume home health. Will need continued PT for symptom relief of bone spurs.     DIET: Diabetic     ACTIVITY: Exercise encouraged     DIAGNOSIS: Bilateral heel bone spurs     Return to ER if symptoms persist or worsen.        Discharge Instructions    Discharged to home by taxi with relative. Discharged via wheelchair, hospital escort: Yes.  Special equipment needed: Not Applicable    Be sure to schedule a follow-up appointment with your primary care doctor or any specialists as instructed.     Discharge Plan:   Diet Plan: Discussed  Activity Level: Discussed  Confirmed Follow up Appointment: Patient to Call and Schedule Appointment  Medication Reconciliation Updated: Yes  Influenza Vaccine Indication: Not indicated: Previously immunized this influenza season and > 8 years of age    I understand that a diet low in cholesterol, fat, and sodium is recommended for good health. Unless I have been given specific instructions below for another diet, I accept this instruction as my diet prescription.   Other diet: Diabetic      Special Instructions: None    · Is patient discharged on  Warfarin / Coumadin?   No     Depression / Suicide Risk    As you are discharged from this [facility] facility, it is important to learn how to keep safe from harming yourself.    Recognize the warning signs:  · Abrupt changes in personality, positive or negative- including increase in energy   · Giving away possessions  · Change in eating patterns- significant weight changes-  positive or negative  · Change in sleeping patterns- unable to sleep or sleeping all the time   · Unwillingness or inability to communicate  · Depression  · Unusual sadness, discouragement and loneliness  · Talk of wanting to die  · Neglect of personal appearance   · Rebelliousness- reckless behavior  · Withdrawal from people/activities they love  · Confusion- inability to concentrate     If you or a loved one observes any of these behaviors or has concerns about self-harm, here's what you can do:  · Talk about it- your feelings and reasons for harming yourself  · Remove any means that you might use to hurt yourself (examples: pills, rope, extension cords, firearm)  · Get professional help from the community (Mental Health, Substance Abuse, psychological counseling)  · Do not be alone:Call your Safe Contact- someone whom you trust who will be there for you.  · Call your local CRISIS HOTLINE 608-0736 or 854-513-3245  · Call your local Children's Mobile Crisis Response Team [place] (241) 340-4061 or www.Octavian  · Call the toll free National Suicide Prevention Hotlines   · National Suicide Prevention Lifeline 957-037-BGBH (3041)  · National Hope Line Network 800-SUICIDE (810-8620)

## 2019-11-12 NOTE — CARE PLAN
Problem: Knowledge Deficit  Goal: Knowledge of disease process/condition, treatment plan, diagnostic tests, and medications will improve  Outcome: PROGRESSING SLOWER THAN EXPECTED  Goal: Knowledge of the prescribed therapeutic regimen will improve  Outcome: PROGRESSING SLOWER THAN EXPECTED  Pt educated on the importance of ambulation and treatment plan.     Problem: Mobility  Goal: Risk for activity intolerance will decrease  Outcome: PROGRESSING SLOWER THAN EXPECTED  Pt is unsteady requires 1-2 assist, pt educated on importance of ambulation.

## 2019-11-12 NOTE — PROGRESS NOTES
All aspects of D/C paperwork discussed. Discussed new medications, medication regimen, script for oxy in hand,, S&S to report, outpt resources, outpt appts, tx to wheelchair with minimal assist. Grandmother at bedside. No questions or concerns voiced. Left without signs of distress.

## 2019-11-12 NOTE — DISCHARGE SUMMARY
Discharge Summary    CHIEF COMPLAINT ON ADMISSION  Chief Complaint   Patient presents with   • Foot Pain     Reason for Admission  Bilateral heel pain    Admission Date  11/6/2019    CODE STATUS  Full Code    HPI & HOSPITAL COURSE  Ms. Balderrama is a chronically ill 29-year-old female well known to our service due to frequent admissions who has a history of borderline personality disorder, schizophrenia, type 2 diabetes, morbid obesity, IAST treated with corlanor, previous ablation for sinus node modification, TONYA, optic neuritis on immunosuppression therapy who presented to the ED on 11/6/19 with bilateral heel pain.  She was just discharged a week ago after having concern for cellulitis and C. difficile.  She was prescribed augmentin and vancomycin oral (c diff negative). However, her pain was getting worse. On exam she had significant pain to the touch, but no purulent drainage. Of significance, she usually walks around barefoot. 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 She was placed on cefepime and vancomycin pending ID consultation, but they did not think she had cellulitis or infection and recommended stopping all antibiotics. She was monitored on telemetry due to her cardiac history and chronic chest pain, where no arrhythmias were noted, troponins were negative, and her echocardiogram was normal.  Review of imaging on 11/11/2019 showed bilateral calcaneal bone spurs.  She was then placed on scheduled ibuprofen in addition to working with PT/OT who are recommending back home with home health.  She was previously on service with [facility] who has reaccepted her. Psychiatry was consulted on 11/10/19 due to concern for conversion disorder but didn't make any inpatient medication changes. However, they would like her to follow-up with outpatient psychiatry for possible changes from Geodon to Risperidone  "due to her having a prolonged QTc interval.       Therefore, she is discharged in good and stable condition to home with organized home healthcare and close outpatient follow-up.    The patient met 2-midnight criteria for an inpatient stay at the time of discharge.    --------------------------------------------------------------------------------------  Discharge Instructions per Cassy Olmedo, A.P.REffieN.  -Take all medications as prescribed  -Drink lots of water while on ibuprofen  -Follow up with your PCP within 1 week of hospital discharge  -Follow up with psychiatry after discharge. Inpatient psychiatry recommending changing your medications from Geodon to Risperidone due to having a prolonged QTc interval.   -Resume home health. Will need continued PT for symptom relief of bone spurs.     DIET: Diabetic    ACTIVITY: Exercise encouraged    DIAGNOSIS: Bilateral heel bone spurs    Return to ER if symptoms persist or worsen.  ---------------------------------------------------------------------------------------    Discharge Date  11/12/2019    FOLLOW UP ITEMS POST DISCHARGE  PCP in 1 week.  Psychiatry at first available.  Pain management as previously scheduled.    DISCHARGE DIAGNOSES  Principal Problem:    Bilateral heel pain secondary to calcaneal bone spurs POA: Yes  Active Problems:    Chronic respiratory failure with hypoxia, on home oxygen therapy (HCC) POA: Yes    Borderline personality disorder in adult ([facility]) POA: Yes      Overview: 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      \"per patients grandmother\"    Peripheral neuropathy and chornic pain syndrome (CMS-HCC) (Chronic) POA: Yes    GERD (gastroesophageal reflux disease) (Chronic) POA: Yes    Schizophrenia (HCC) POA: Yes    Diabetes type 2, controlled ([facility]) POA: Yes    Acquired hypothyroidism (Chronic) POA: Yes    Moderate intermittent asthma without complication POA: Yes    Immunocompromised (HCC) POA: Yes   "  History of atrial fibrillation and cardiomyopathy? POA: Yes  Resolved Problems:    * No resolved hospital problems. *    FOLLOW UP  Future Appointments   Date Time Provider Department Center   11/12/2019 12:00 PM Torres Brody M.D. [address]   11/15/2019  9:45 AM Ignacia eHrrera M.D. [facility] None   3/9/2020  1:20 PM Shahriar Moncada M.D. [facility] None     Torres Brody M.D.  [address]  [facility] [address]  [facility] [address]  [phone]    Schedule an appointment as soon as possible for a visit in 1 week    MEDICATIONS ON DISCHARGE     Medication List      Start taking these medications      Instructions   fluoxetine 40 MG capsule  Start taking on:  November 13, 2019  Commonly known as:  PROZAC   Doctor's comments:  Pt on prior to admission  Take 1 Cap by mouth every day.  Dose:  40 mg     ibuprofen 600 MG Tabs  Commonly known as:  MOTRIN   Doctor's comments:  Drink lots of water while on this medication  Take 1 Tab by mouth 3 times a day for 7 days.  Dose:  600 mg     oxyCODONE immediate-release 5 MG Tabs  Commonly known as:  ROXICODONE   Take 1 Tab by mouth every 8 hours as needed for Severe Pain (for breakthrough pain only) for up to 3 days.  Dose:  5 mg        Continue taking these medications      Instructions   albuterol 108 (90 Base) MCG/ACT Aers inhalation aerosol   Inhale 2 Puffs by mouth every 6 hours as needed for Shortness of Breath.  Dose:  2 Puff     aspirin EC 81 MG Tbec  Commonly known as:  ECOTRIN   Take 81 mg by mouth every day.  Dose:  81 mg     busPIRone 10 MG Tabs tablet  Commonly known as:  BUSPAR   Take 10 mg by mouth 2 times a day.  Dose:  10 mg     CELLCEPT 500 MG tablet  Generic drug:  mycophenolate   Take 1,000 mg by mouth 2 times a day.  Dose:  1,000 mg     CORLANOR 7.5 MG Tabs  Generic drug:  Ivabradine HCl   Take 7.5 mg by mouth 2 Times a Day.  Dose:  7.5 mg     folic acid 800 MCG tablet  Commonly known as:  FOLVITE   Take 800 mg by mouth every day.  Dose:  800 mg     furosemide 80 MG  Tabs  Commonly known as:  LASIX   Take 80 mg by mouth every day.  Dose:  80 mg     gabapentin 300 MG Caps  Commonly known as:  NEURONTIN   Take 300 mg by mouth 4 times a day.  Dose:  300 mg     ipratropium-albuterol 0.5-2.5 (3) MG/3ML nebulizer solution  Commonly known as:  DUONEB   3 mL by Nebulization route every 6 hours as needed for Shortness of Breath.  Dose:  3 mL     levothyroxine 75 MCG Tabs  Commonly known as:  SYNTHROID   Take 75 mcg by mouth Every morning on an empty stomach.  Dose:  75 mcg     LYRICA 300 MG capsule  Generic drug:  pregabalin   Take 300 mg by mouth 2 Times a Day.  Dose:  300 mg     Melatonin 5 MG Tabs   Take 10 mg by mouth every evening.  Dose:  10 mg     methocarbamol 750 MG Tabs  Commonly known as:  ROBAXIN   Take 750 mg by mouth 3 times a day.  Dose:  750 mg     montelukast 10 MG Tabs  Commonly known as:  SINGULAIR   Take 10 mg by mouth every day.  Dose:  10 mg     NEXPLANON 68 MG Impl implant  Generic drug:  etonogestrel   Inject 1 Each as instructed Once.  Dose:  1 Each     ondansetron 4 MG Tbdp  Commonly known as:  ZOFRAN ODT   Take 4 mg by mouth every 6 hours as needed for Nausea.  Dose:  4 mg     potassium chloride SA 20 MEQ Tbcr  Commonly known as:  Kdur   Take 20 mEq by mouth 2 times a day.  Dose:  20 mEq     predniSONE 10 MG Tabs  Commonly known as:  DELTASONE   Take 10 mg by mouth every morning. Maintenance Medication.  Dose:  10 mg     sodium bicarbonate 325 MG Tabs   Take 650 mg by mouth 2 Times a Day.  Dose:  650 mg     tiotropium 18 MCG Caps  Commonly known as:  SPIRIVA   Inhale 1 Cap by mouth every day.  Dose:  18 mcg     traZODone 100 MG Tabs  Commonly known as:  DESYREL   Take 100 mg by mouth every evening.  Dose:  100 mg     TRULICITY 1.5 MG/0.5ML Sopn  Generic drug:  Dulaglutide   Inject 1.5 mg as instructed every Friday.  Dose:  1.5 mg     TYLENOL PM EXTRA STRENGTH  MG Tabs  Generic drug:  diphenhydrAMINE-APAP (sleep)   Take 2 Tabs by mouth every  "evening.  Dose:  2 Tab     VOLTAREN 1 % Gel  Generic drug:  Diclofenac Sodium   Apply 1 Application to skin as directed 4 times a day as needed (on wrists, back, ankles, and feet).  Dose:  1 Application     ZANTAC 300 MG tablet  Generic drug:  ranitidine   Take 300 mg by mouth every morning.  Dose:  300 mg     ziprasidone 80 MG Caps  Commonly known as:  GEODON   Take 80 mg by mouth 2 Times a Day.  Dose:  80 mg        Stop taking these medications    vancomycin 50 mg/mL 50 mg/mL Soln          Allergies  Allergies   Allergen Reactions   • Cefdinir Shortness of Breath and Itching     Tolerated 1/18/17  Tolerates ceftriaxone    • Depakote [Divalproex Sodium] Unspecified     Muscle spasms/muscle pain and weakness     • Doxycycline Anaphylaxis and Vomiting     RXN=unknown   • Amitriptyline Unspecified     Headaches     • Aripiprazole [Abilify] Unspecified     Headaches/muscle twitching     • Clindamycin Nausea     Even with food     • Ees [Erythromycin] Vomiting and Nausea   • Flagyl [Metronidazole Hcl] Unspecified     \"eye problems\"     • Flomax [Tamsulosin Hydrochloride] Swelling   • Levaquin Unspecified     Severe muscle cramps in legs  RXN=unknown   • Metformin Unspecified     Increased lactic acid      • Tape Rash     Tears skin off  coban with Tegaderm tape ok intermittently  RXN=ongoing   • Vancomycin Itching     Pt becomes flushed in face and chest.   RXN=7/10/16   • Wound Dressing Adhesive Hives     By pt report   • Cephalexin [Keflex] Rash     Pt states she gets a rash with this medication  Tolerates ceftriaxone   • Divalproex Sodium [Valproic Acid] Rash     .   • Erythromycin Rash     .   • Levofloxacin Unspecified     Leg muscle cramps   • Metronidazole Rash     .   • Tamsulosin Hcl      DIET  Orders Placed This Encounter   Procedures   • Diet Order Cardiac     Standing Status:   Standing     Number of Occurrences:   1     Order Specific Question:   Diet:     Answer:   Cardiac [6]     Order Specific Question:  "  Miscellaneous modifications:     Answer:   No Decaf, No Caffeine(for test) [11]     ACTIVITY  As tolerated.  Exercise encouraged.  Weight bearing as tolerated    CONSULTATIONS  Psychiatry  Infectious disease    PROCEDURES  None    LABORATORY  Lab Results   Component Value Date    SODIUM 136 11/12/2019    POTASSIUM 3.9 11/12/2019    CHLORIDE 102 11/12/2019    CO2 25 11/12/2019    GLUCOSE 116 (H) 11/12/2019    BUN 15 11/12/2019    CREATININE 0.89 11/12/2019    CREATININE 0.75 (L) 07/20/2010    GLOMRATE >59 07/20/2010      Lab Results   Component Value Date    WBC 7.4 11/12/2019    WBC 6.1 07/20/2010    HEMOGLOBIN 13.2 11/12/2019    HEMATOCRIT 38.7 11/12/2019    PLATELETCT 184 11/12/2019      Total time of the discharge process exceeds 40 minutes.      Electronically signed by:  Cassy Olmedo, MSN, RN, APRN, ACNPC-AG, CCRN  Nurse Practitioner, [facility]  Work # [phone]  Cell # [phone] (call, text, or tiger text)    11/12/2019    8:21 AM

## 2019-11-14 ENCOUNTER — PATIENT OUTREACH (OUTPATIENT)
Dept: MEDICAL GROUP | Facility: MEDICAL CENTER | Age: 29
End: 2019-11-14

## 2019-11-14 NOTE — PROGRESS NOTES
11/14/19 10:15am:  CM post discharge outreach call first attempt.   left requesting return call to  at [phone].    11/14/19 3:45pm:  CM post discharge call second attempt.  No answer.  CM unable to complete post discharge call at this time.

## 2019-11-15 ENCOUNTER — APPOINTMENT (OUTPATIENT)
Dept: PULMONOLOGY | Facility: HOSPICE | Age: 29
End: 2019-11-15
Payer: MEDICARE

## 2019-11-18 ENCOUNTER — OFFICE VISIT (OUTPATIENT)
Dept: MEDICAL GROUP | Facility: MEDICAL CENTER | Age: 29
End: 2019-11-18
Payer: MEDICARE

## 2019-11-18 VITALS
HEART RATE: 89 BPM | BODY MASS INDEX: 48.82 KG/M2 | OXYGEN SATURATION: 98 % | DIASTOLIC BLOOD PRESSURE: 62 MMHG | TEMPERATURE: 98.2 F | HEIGHT: 65 IN | SYSTOLIC BLOOD PRESSURE: 114 MMHG | WEIGHT: 293 LBS

## 2019-11-18 DIAGNOSIS — E11.8 CONTROLLED TYPE 2 DIABETES MELLITUS WITH COMPLICATION, WITH LONG-TERM CURRENT USE OF INSULIN (HCC): ICD-10-CM

## 2019-11-18 DIAGNOSIS — G63 POLYNEUROPATHY ASSOCIATED WITH UNDERLYING DISEASE (HCC): Chronic | ICD-10-CM

## 2019-11-18 DIAGNOSIS — M79.89 FOOT SWELLING: ICD-10-CM

## 2019-11-18 DIAGNOSIS — Z79.4 CONTROLLED TYPE 2 DIABETES MELLITUS WITH COMPLICATION, WITH LONG-TERM CURRENT USE OF INSULIN (HCC): ICD-10-CM

## 2019-11-18 PROCEDURE — 99495 TRANSJ CARE MGMT MOD F2F 14D: CPT | Performed by: INTERNAL MEDICINE

## 2019-11-18 RX ORDER — LANCETS 30 GAUGE
EACH MISCELLANEOUS
Qty: 100 EACH | Refills: 11 | Status: SHIPPED
Start: 2019-11-18 | End: 2019-11-30

## 2019-11-18 NOTE — PROGRESS NOTES
Subjective:         POST DISCHARGE CALL:  Discharge Date:9/2/2019   Date of Outreach Call: 9/4/2019  1:20 PM  Now that you're home, how are you doing? Fair  Comment:Pt reports no current problems. States she is  feeling okay today.  Do you have questions about your medications? No    Did you fill your medications? Yes    Do you have a follow-up appointment scheduled?Yes  Comment:PCP 9/6/19    Discharging Department: Telemetry 7    Number of Attempts: 1  Current or previous attempts completed within two business days of discharge? Yes  Provided education regarding treatment plan, medication, self-management, ADLs? Yes  Has patient completed Advance Directive? If yes, advise them to bring to appointment. No  Care Manager phone number provided? Yes  Is there anything else I can help you with? No    Nurses outreach call note above reviewed.      Have reviewed discharge summary as well as all imaging and labs performed while in the hospital.    CC: Follow-up hospitalization hospital follow-up foot pain with concerns for cellulitis, neuropathy, diabetes.    HPI:   Kristin presents today with the following.    1. Foot swelling  Presents with questionable history of cellulitis ID did not the diagnosis.  Her redness is significantly improved she is no longer on antibiotics no longer having diarrhea.  She was treated with vancomycin.    2. Polyneuropathy associated with underlying disease ([facility])  Is having considerable foot pain therefore reports she cannot wear foot wear.  She is maintained on medications.    3. Controlled type 2 diabetes mellitus with complication, with long-term current use of insulin ([facility])  Requesting testing supplies she is tolerating Trulicity well and wants weekly basis helping to control appetite weight is trending down slightly.      Patient Active Problem List    Diagnosis Date Noted   • Breast wound 11/06/2017     Priority: High   • Weakness of both lower extremities 06/22/2016     Priority: High    • Bilateral heel pain secondary to calcaneal bone spurs 03/28/2016     Priority: High   • Chronic respiratory failure with hypoxia, on home oxygen therapy ([facility]) 08/08/2018     Priority: Medium   • TONYA (obstructive sleep apnea) 01/09/2018     Priority: Medium   • Morbidly obese ([facility]) 03/07/2016     Priority: Medium   • H/O prior ablation treatment 02/10/2016     Priority: Medium   • Immunocompromised ([facility]) 11/06/2019     Priority: Low   • History of atrial fibrillation and cardiomyopathy? 11/06/2019     Priority: Low   • Moderate intermittent asthma without complication 06/20/2019     Priority: Low   • Optic neuritis 05/27/2019     Priority: Low   • Acquired hypothyroidism 08/04/2017     Priority: Low   • Diabetes type 2, controlled ([facility]) 04/26/2017     Priority: Low   • Depression 10/28/2016     Priority: Low   • Schizophrenia ([facility]) 10/27/2016     Priority: Low   • GERD (gastroesophageal reflux disease) 03/07/2016     Priority: Low   • Peripheral neuropathy and chornic pain syndrome ([facility]) 03/06/2016     Priority: Low   • PCOS (polycystic ovarian syndrome) 11/23/2015     Priority: Low   • Progressive focal motor weakness 06/28/2015     Priority: Low   • Fatty liver disease, nonalcoholic 01/19/2015     Priority: Low   • Anxiety 12/16/2014     Priority: Low   • Knee pain, right 02/13/2014     Priority: Low   • Neurogenic bladder 04/02/2011     Priority: Low   • Borderline personality disorder in adult ([facility]) 09/18/2010     Priority: Low   • Debility 09/30/2019   • Palpitations 10/01/2018   • Functional diarrhea 01/05/2018   • Hashimoto's encephalopathy 05/17/2017   • Bowel and bladder incontinence 10/27/2016   • Galactorrhea 07/22/2016   • Scoliosis 03/07/2016       Current Outpatient Medications   Medication Sig Dispense Refill   • Blood Glucose Test Strips Test strips order: Test strips for care chest meter. Sig: use bid 100 Each 11   • Lancets Lancets order: Lancets for diabetic testing meter. Sig: use bid 100  Each 11   • ibuprofen (MOTRIN) 600 MG Tab Take 1 Tab by mouth 3 times a day for 7 days. 21 Tab 0   • FLUoxetine (PROZAC) 40 MG capsule Take 1 Cap by mouth every day.     • aspirin EC (ECOTRIN) 81 MG Tablet Delayed Response Take 81 mg by mouth every day.     • busPIRone (BUSPAR) 10 MG Tab tablet Take 10 mg by mouth 2 times a day.     • levothyroxine (SYNTHROID) 75 MCG Tab Take 75 mcg by mouth Every morning on an empty stomach.     • pregabalin (LYRICA) 300 MG capsule Take 300 mg by mouth 2 Times a Day.     • montelukast (SINGULAIR) 10 MG Tab Take 10 mg by mouth every day.     • ondansetron (ZOFRAN ODT) 4 MG TABLET DISPERSIBLE Take 4 mg by mouth every 6 hours as needed for Nausea.     • potassium chloride SA (KDUR) 20 MEQ Tab CR Take 20 mEq by mouth 2 times a day.     • ziprasidone (GEODON) 80 MG Cap Take 80 mg by mouth 2 Times a Day.     • traZODone (DESYREL) 100 MG Tab Take 100 mg by mouth every evening.     • predniSONE (DELTASONE) 10 MG Tab Take 10 mg by mouth every morning. Maintenance Medication.     • ranitidine (ZANTAC) 300 MG tablet Take 300 mg by mouth every morning.     • diphenhydrAMINE-APAP, sleep, (TYLENOL PM EXTRA STRENGTH)  MG Tab Take 2 Tabs by mouth every evening.     • Diclofenac Sodium (VOLTAREN) 1 % Gel Apply 1 Application to skin as directed 4 times a day as needed (on wrists, back, ankles, and feet).     • tiotropium (SPIRIVA) 18 MCG Cap Inhale 1 Cap by mouth every day. 90 Cap 3   • folic acid (FOLVITE) 800 MCG tablet Take 800 mg by mouth every day.     • Ivabradine HCl (CORLANOR) 7.5 MG Tab Take 7.5 mg by mouth 2 Times a Day.     • ipratropium-albuterol (DUONEB) 0.5-2.5 (3) MG/3ML nebulizer solution 3 mL by Nebulization route every 6 hours as needed for Shortness of Breath. 60 Bullet 1   • etonogestrel (NEXPLANON) 68 MG Implant implant Inject 1 Each as instructed Once.     • mycophenolate (CELLCEPT) 500 MG tablet Take 1,000 mg by mouth 2 times a day.     • gabapentin (NEURONTIN) 300 MG  "Cap Take 300 mg by mouth 4 times a day.     • Dulaglutide (TRULICITY) 1.5 MG/0.5ML Solution Pen-injector Inject 1.5 mg as instructed every Friday.     • sodium bicarbonate 325 MG Tab Take 650 mg by mouth 2 Times a Day.     • albuterol 108 (90 Base) MCG/ACT Aero Soln inhalation aerosol Inhale 2 Puffs by mouth every 6 hours as needed for Shortness of Breath.     • Melatonin 5 MG Tab Take 10 mg by mouth every evening.     • furosemide (LASIX) 80 MG Tab Take 80 mg by mouth every day.     • methocarbamol (ROBAXIN) 750 MG Tab Take 750 mg by mouth 3 times a day.       No current facility-administered medications for this visit.          Allergies as of 11/18/2019 - Reviewed 11/07/2019   Allergen Reaction Noted   • Cefdinir Shortness of Breath and Itching 03/01/2016   • Depakote [divalproex sodium] Unspecified 06/14/2010   • Doxycycline Anaphylaxis and Vomiting 08/15/2012   • Amitriptyline Unspecified 10/31/2013   • Aripiprazole [abilify] Unspecified 01/17/2013   • Clindamycin Nausea 02/02/2011   • Ees [erythromycin] Vomiting and Nausea 08/28/2010   • Flagyl [metronidazole hcl] Unspecified 03/31/2011   • Flomax [tamsulosin hydrochloride] Swelling 09/24/2009   • Levaquin Unspecified 10/31/2013   • Metformin Unspecified 07/23/2013   • Tape Rash 08/15/2012   • Vancomycin Itching 07/10/2016   • Wound dressing adhesive Hives 01/12/2018   • Cephalexin [keflex] Rash 01/01/2017   • Divalproex sodium [valproic acid] Rash 03/30/2017   • Erythromycin Rash 03/30/2017   • Levofloxacin Unspecified 10/27/2016   • Metronidazole Rash 03/30/2017   • Tamsulosin hcl  09/19/2010        ROS: Denies Chest pain, SOB, LE edema.    /62 (BP Location: Left arm, Patient Position: Sitting, BP Cuff Size: Large adult)   Pulse 89   Temp 36.8 °C (98.2 °F) (Temporal)   Ht 1.651 m (5' 5\")   Wt (!) 170.1 kg (375 lb)   LMP 07/01/2019 (LMP Unknown)   SpO2 98%   BMI 62.40 kg/m²     Physical Exam:  Gen:         Alert and oriented, No apparent " distress.  Neck:        No Lymphadenopathy or Bruits.  Lungs:     Clear to auscultation bilaterally  CV:          Regular rate and rhythm. No murmurs, rubs or gallops.               Ext:          No clubbing, cyanosis, 1+ bilateral edema.      Assessment and Plan.   29 y.o. female with the following issues.    1. Foot swelling  Again thought not to be infectious manage expectantly continue diuretics    2. Polyneuropathy associated with underlying disease ([facility])  Persistent pain discussion about medication changes she will continue her current regimen    3. Controlled type 2 diabetes mellitus with complication, with long-term current use of insulin ([facility])  Well-controlled as of last check tolerating Trulicity have given use testing supplies.  - Blood Glucose Test Strips; Test strips order: Test strips for care chest meter. Sig: use bid  Dispense: 100 Each; Refill: 11  - Lancets; Lancets order: Lancets for diabetic testing meter. Sig: use bid  Dispense: 100 Each; Refill: 11          - Hospitalization and results reviewed with patient.   - Medications reviewed including instructions regarding high risk medications, dosing and side effects.

## 2019-11-21 ENCOUNTER — TELEPHONE (OUTPATIENT)
Dept: CARDIOLOGY | Facility: MEDICAL CENTER | Age: 29
End: 2019-11-21

## 2019-11-21 ENCOUNTER — APPOINTMENT (OUTPATIENT)
Dept: PULMONOLOGY | Facility: HOSPICE | Age: 29
End: 2019-11-21
Payer: MEDICARE

## 2019-11-21 NOTE — TELEPHONE ENCOUNTER
"Patient states that that she was instructed to call for weight gain by the hospitalist when she was discharged recently.  She had a follow up visit with Dr. Brody on the 18th (Monday) and he noted 1+ edema of ankles bilaterally at that time and weight of 170#.  She states that she is not SOB, but \"just told to call because of my weight.\"    Discharge summary ([facility]) from 11/12:  She was monitored on telemetry due to her cardiac history and chronic chest pain, where no arrhythmias were noted, troponins were negative, and her echocardiogram was normal.    And:  --Follow up with psychiatry after discharge. Inpatient psychiatry recommending changing your medications from Geodon to Risperidone due to having a prolonged QTc interval.    --Drink lots of water while on ibuprofen    To Xochitl LIRIANO to advise.  Patient is taking Furosemide 80 mg /day.  There are no openings for you or Dr. Leon within 2 weeks.           "

## 2019-11-21 NOTE — TELEPHONE ENCOUNTER
EA      Patient said she has a weight gain of 6 pounds in the last 2 days and the lasix isn't working. She can be reached at [phone].

## 2019-11-21 NOTE — TELEPHONE ENCOUNTER
Edema may be secondary to some of her other medications vs increased water intake.  Echo relatively normal in the hospital.    She can either touch base with PCP or be seen by any provider here if needed.  Xochitl

## 2019-11-25 ENCOUNTER — TELEPHONE (OUTPATIENT)
Dept: MEDICAL GROUP | Facility: MEDICAL CENTER | Age: 29
End: 2019-11-25

## 2019-11-25 NOTE — TELEPHONE ENCOUNTER
1. Caller Name: Chanel from [facility]                                         Call Back Number: [phone]      Patient approves a detailed voicemail message: N\A    Chanel would like a verbal to continue PT for pt for Fall risk and for weakness? 2 times weekly for 4 weeks?

## 2019-11-30 ENCOUNTER — APPOINTMENT (OUTPATIENT)
Dept: RADIOLOGY | Facility: MEDICAL CENTER | Age: 29
DRG: 103 | End: 2019-11-30
Attending: EMERGENCY MEDICINE
Payer: MEDICARE

## 2019-11-30 ENCOUNTER — HOSPITAL ENCOUNTER (INPATIENT)
Facility: MEDICAL CENTER | Age: 29
LOS: 1 days | DRG: 103 | End: 2019-12-02
Attending: EMERGENCY MEDICINE | Admitting: HOSPITALIST
Payer: MEDICARE

## 2019-11-30 DIAGNOSIS — S09.90XS CLOSED HEAD INJURY, SEQUELA: ICD-10-CM

## 2019-11-30 PROBLEM — S09.90XA CLOSED HEAD INJURY: Status: ACTIVE | Noted: 2019-11-30

## 2019-11-30 PROBLEM — E83.51 HYPOCALCEMIA: Status: ACTIVE | Noted: 2019-11-30

## 2019-11-30 LAB
ALBUMIN SERPL BCP-MCNC: 3.4 G/DL (ref 3.2–4.9)
ALBUMIN/GLOB SERPL: 2.4 G/DL
ALP SERPL-CCNC: 36 U/L (ref 30–99)
ALT SERPL-CCNC: 18 U/L (ref 2–50)
ANION GAP SERPL CALC-SCNC: 9 MMOL/L (ref 0–11.9)
APPEARANCE UR: CLEAR
AST SERPL-CCNC: 10 U/L (ref 12–45)
BACTERIA #/AREA URNS HPF: NEGATIVE /HPF
BASOPHILS # BLD AUTO: 0.5 % (ref 0–1.8)
BASOPHILS # BLD: 0.03 K/UL (ref 0–0.12)
BILIRUB SERPL-MCNC: 0.4 MG/DL (ref 0.1–1.5)
BILIRUB UR QL STRIP.AUTO: NEGATIVE
BUN SERPL-MCNC: 8 MG/DL (ref 8–22)
CALCIUM SERPL-MCNC: 7.3 MG/DL (ref 8.5–10.5)
CHLORIDE SERPL-SCNC: 117 MMOL/L (ref 96–112)
CO2 SERPL-SCNC: 17 MMOL/L (ref 20–33)
COLOR UR: YELLOW
CREAT SERPL-MCNC: 0.62 MG/DL (ref 0.5–1.4)
EOSINOPHIL # BLD AUTO: 0.11 K/UL (ref 0–0.51)
EOSINOPHIL NFR BLD: 1.7 % (ref 0–6.9)
EPI CELLS #/AREA URNS HPF: NEGATIVE /HPF
ERYTHROCYTE [DISTWIDTH] IN BLOOD BY AUTOMATED COUNT: 42.5 FL (ref 35.9–50)
GLOBULIN SER CALC-MCNC: 1.4 G/DL (ref 1.9–3.5)
GLUCOSE SERPL-MCNC: 83 MG/DL (ref 65–99)
GLUCOSE UR STRIP.AUTO-MCNC: NEGATIVE MG/DL
HCT VFR BLD AUTO: 36.4 % (ref 37–47)
HGB BLD-MCNC: 12 G/DL (ref 12–16)
HYALINE CASTS #/AREA URNS LPF: ABNORMAL /LPF
IMM GRANULOCYTES # BLD AUTO: 0.02 K/UL (ref 0–0.11)
IMM GRANULOCYTES NFR BLD AUTO: 0.3 % (ref 0–0.9)
KETONES UR STRIP.AUTO-MCNC: NEGATIVE MG/DL
LEUKOCYTE ESTERASE UR QL STRIP.AUTO: ABNORMAL
LYMPHOCYTES # BLD AUTO: 1.98 K/UL (ref 1–4.8)
LYMPHOCYTES NFR BLD: 31.3 % (ref 22–41)
MCH RBC QN AUTO: 30.5 PG (ref 27–33)
MCHC RBC AUTO-ENTMCNC: 33 G/DL (ref 33.6–35)
MCV RBC AUTO: 92.6 FL (ref 81.4–97.8)
MICRO URNS: ABNORMAL
MONOCYTES # BLD AUTO: 0.44 K/UL (ref 0–0.85)
MONOCYTES NFR BLD AUTO: 7 % (ref 0–13.4)
NEUTROPHILS # BLD AUTO: 3.74 K/UL (ref 2–7.15)
NEUTROPHILS NFR BLD: 59.2 % (ref 44–72)
NITRITE UR QL STRIP.AUTO: NEGATIVE
NRBC # BLD AUTO: 0 K/UL
NRBC BLD-RTO: 0 /100 WBC
PH UR STRIP.AUTO: 5 [PH] (ref 5–8)
PLATELET # BLD AUTO: 173 K/UL (ref 164–446)
PMV BLD AUTO: 11.9 FL (ref 9–12.9)
POTASSIUM SERPL-SCNC: 3.1 MMOL/L (ref 3.6–5.5)
PROT SERPL-MCNC: 4.8 G/DL (ref 6–8.2)
PROT UR QL STRIP: NEGATIVE MG/DL
RBC # BLD AUTO: 3.93 M/UL (ref 4.2–5.4)
RBC # URNS HPF: ABNORMAL /HPF
RBC UR QL AUTO: ABNORMAL
SODIUM SERPL-SCNC: 143 MMOL/L (ref 135–145)
SP GR UR STRIP.AUTO: 1.02
UROBILINOGEN UR STRIP.AUTO-MCNC: 0.2 MG/DL
WBC # BLD AUTO: 6.3 K/UL (ref 4.8–10.8)
WBC #/AREA URNS HPF: ABNORMAL /HPF

## 2019-11-30 PROCEDURE — 70450 CT HEAD/BRAIN W/O DYE: CPT

## 2019-11-30 PROCEDURE — 99220 PR INITIAL OBSERVATION CARE,LEVL III: CPT | Performed by: HOSPITALIST

## 2019-11-30 PROCEDURE — 700111 HCHG RX REV CODE 636 W/ 250 OVERRIDE (IP): Performed by: EMERGENCY MEDICINE

## 2019-11-30 PROCEDURE — 96375 TX/PRO/DX INJ NEW DRUG ADDON: CPT

## 2019-11-30 PROCEDURE — 700111 HCHG RX REV CODE 636 W/ 250 OVERRIDE (IP): Performed by: HOSPITALIST

## 2019-11-30 PROCEDURE — G0378 HOSPITAL OBSERVATION PER HR: HCPCS

## 2019-11-30 PROCEDURE — 99285 EMERGENCY DEPT VISIT HI MDM: CPT

## 2019-11-30 PROCEDURE — 87086 URINE CULTURE/COLONY COUNT: CPT

## 2019-11-30 PROCEDURE — 96374 THER/PROPH/DIAG INJ IV PUSH: CPT

## 2019-11-30 PROCEDURE — 72070 X-RAY EXAM THORAC SPINE 2VWS: CPT

## 2019-11-30 PROCEDURE — 81001 URINALYSIS AUTO W/SCOPE: CPT

## 2019-11-30 PROCEDURE — 87077 CULTURE AEROBIC IDENTIFY: CPT

## 2019-11-30 PROCEDURE — 72100 X-RAY EXAM L-S SPINE 2/3 VWS: CPT

## 2019-11-30 PROCEDURE — 85025 COMPLETE CBC W/AUTO DIFF WBC: CPT

## 2019-11-30 PROCEDURE — A9270 NON-COVERED ITEM OR SERVICE: HCPCS | Performed by: HOSPITALIST

## 2019-11-30 PROCEDURE — 700102 HCHG RX REV CODE 250 W/ 637 OVERRIDE(OP): Performed by: HOSPITALIST

## 2019-11-30 PROCEDURE — 80053 COMPREHEN METABOLIC PANEL: CPT

## 2019-11-30 RX ORDER — PROMETHAZINE HYDROCHLORIDE 25 MG/1
12.5-25 TABLET ORAL EVERY 4 HOURS PRN
Status: DISCONTINUED | OUTPATIENT
Start: 2019-11-30 | End: 2019-11-30

## 2019-11-30 RX ORDER — HEPARIN SODIUM 5000 [USP'U]/ML
5000 INJECTION, SOLUTION INTRAVENOUS; SUBCUTANEOUS EVERY 8 HOURS
Status: DISCONTINUED | OUTPATIENT
Start: 2019-11-30 | End: 2019-12-02 | Stop reason: HOSPADM

## 2019-11-30 RX ORDER — BISACODYL 10 MG
10 SUPPOSITORY, RECTAL RECTAL
Status: DISCONTINUED | OUTPATIENT
Start: 2019-11-30 | End: 2019-12-02 | Stop reason: HOSPADM

## 2019-11-30 RX ORDER — SODIUM CHLORIDE 9 MG/ML
INJECTION, SOLUTION INTRAVENOUS CONTINUOUS
Status: DISCONTINUED | OUTPATIENT
Start: 2019-11-30 | End: 2019-12-02 | Stop reason: HOSPADM

## 2019-11-30 RX ORDER — IPRATROPIUM BROMIDE AND ALBUTEROL SULFATE 2.5; .5 MG/3ML; MG/3ML
3 SOLUTION RESPIRATORY (INHALATION) EVERY 6 HOURS PRN
Status: DISCONTINUED | OUTPATIENT
Start: 2019-11-30 | End: 2019-12-02 | Stop reason: HOSPADM

## 2019-11-30 RX ORDER — ONDANSETRON 4 MG/1
4 TABLET, ORALLY DISINTEGRATING ORAL EVERY 4 HOURS PRN
Status: DISCONTINUED | OUTPATIENT
Start: 2019-11-30 | End: 2019-11-30

## 2019-11-30 RX ORDER — MONTELUKAST SODIUM 10 MG/1
10 TABLET ORAL DAILY
Status: DISCONTINUED | OUTPATIENT
Start: 2019-12-01 | End: 2019-12-02 | Stop reason: HOSPADM

## 2019-11-30 RX ORDER — FLUOXETINE HYDROCHLORIDE 40 MG/1
40 CAPSULE ORAL DAILY
COMMUNITY
End: 2020-02-25

## 2019-11-30 RX ORDER — AMOXICILLIN 250 MG
2 CAPSULE ORAL 2 TIMES DAILY
Status: DISCONTINUED | OUTPATIENT
Start: 2019-11-30 | End: 2019-12-02 | Stop reason: HOSPADM

## 2019-11-30 RX ORDER — MYCOPHENOLATE MOFETIL 250 MG/1
1000 CAPSULE ORAL 2 TIMES DAILY
Status: DISCONTINUED | OUTPATIENT
Start: 2019-11-30 | End: 2019-12-02 | Stop reason: HOSPADM

## 2019-11-30 RX ORDER — CALCIUM GLUCONATE 94 MG/ML
1 INJECTION, SOLUTION INTRAVENOUS ONCE
Status: COMPLETED | OUTPATIENT
Start: 2019-11-30 | End: 2019-11-30

## 2019-11-30 RX ORDER — PROMETHAZINE HYDROCHLORIDE 25 MG/1
12.5-25 SUPPOSITORY RECTAL EVERY 4 HOURS PRN
Status: DISCONTINUED | OUTPATIENT
Start: 2019-11-30 | End: 2019-11-30

## 2019-11-30 RX ORDER — METHOCARBAMOL 750 MG/1
750 TABLET, FILM COATED ORAL 3 TIMES DAILY
Status: DISCONTINUED | OUTPATIENT
Start: 2019-11-30 | End: 2019-12-02 | Stop reason: HOSPADM

## 2019-11-30 RX ORDER — GABAPENTIN 300 MG/1
300 CAPSULE ORAL 4 TIMES DAILY
Status: DISCONTINUED | OUTPATIENT
Start: 2019-11-30 | End: 2019-12-02 | Stop reason: HOSPADM

## 2019-11-30 RX ORDER — PROCHLORPERAZINE EDISYLATE 5 MG/ML
5-10 INJECTION INTRAMUSCULAR; INTRAVENOUS EVERY 4 HOURS PRN
Status: DISCONTINUED | OUTPATIENT
Start: 2019-11-30 | End: 2019-11-30

## 2019-11-30 RX ORDER — PREDNISONE 10 MG/1
10 TABLET ORAL EVERY MORNING
Status: DISCONTINUED | OUTPATIENT
Start: 2019-12-01 | End: 2019-12-02 | Stop reason: HOSPADM

## 2019-11-30 RX ORDER — KETOROLAC TROMETHAMINE 30 MG/ML
15 INJECTION, SOLUTION INTRAMUSCULAR; INTRAVENOUS ONCE
Status: COMPLETED | OUTPATIENT
Start: 2019-11-30 | End: 2019-11-30

## 2019-11-30 RX ORDER — PREGABALIN 100 MG/1
300 CAPSULE ORAL 2 TIMES DAILY
Status: DISCONTINUED | OUTPATIENT
Start: 2019-11-30 | End: 2019-12-02 | Stop reason: HOSPADM

## 2019-11-30 RX ORDER — LEVOTHYROXINE SODIUM 0.07 MG/1
75 TABLET ORAL
Status: DISCONTINUED | OUTPATIENT
Start: 2019-12-01 | End: 2019-12-02 | Stop reason: HOSPADM

## 2019-11-30 RX ORDER — FLUOXETINE HYDROCHLORIDE 20 MG/1
40 CAPSULE ORAL DAILY
Status: DISCONTINUED | OUTPATIENT
Start: 2019-12-01 | End: 2019-12-02 | Stop reason: HOSPADM

## 2019-11-30 RX ORDER — ONDANSETRON 2 MG/ML
4 INJECTION INTRAMUSCULAR; INTRAVENOUS EVERY 4 HOURS PRN
Status: DISCONTINUED | OUTPATIENT
Start: 2019-11-30 | End: 2019-11-30

## 2019-11-30 RX ORDER — ZIPRASIDONE HYDROCHLORIDE 80 MG/1
80 CAPSULE ORAL 2 TIMES DAILY
Status: DISCONTINUED | OUTPATIENT
Start: 2019-11-30 | End: 2019-12-02 | Stop reason: HOSPADM

## 2019-11-30 RX ORDER — ONDANSETRON 4 MG/1
4 TABLET, ORALLY DISINTEGRATING ORAL ONCE
Status: COMPLETED | OUTPATIENT
Start: 2019-11-30 | End: 2019-11-30

## 2019-11-30 RX ORDER — POTASSIUM CHLORIDE 20 MEQ/1
40 TABLET, EXTENDED RELEASE ORAL ONCE
Status: COMPLETED | OUTPATIENT
Start: 2019-11-30 | End: 2019-12-01

## 2019-11-30 RX ORDER — MORPHINE SULFATE 4 MG/ML
4 INJECTION, SOLUTION INTRAMUSCULAR; INTRAVENOUS
Status: DISCONTINUED | OUTPATIENT
Start: 2019-11-30 | End: 2019-12-01

## 2019-11-30 RX ORDER — OXYCODONE HYDROCHLORIDE 5 MG/1
5 TABLET ORAL
Status: DISCONTINUED | OUTPATIENT
Start: 2019-11-30 | End: 2019-12-01

## 2019-11-30 RX ORDER — TRAZODONE HYDROCHLORIDE 100 MG/1
100 TABLET ORAL EVERY EVENING
Status: DISCONTINUED | OUTPATIENT
Start: 2019-11-30 | End: 2019-12-02 | Stop reason: HOSPADM

## 2019-11-30 RX ORDER — ALBUTEROL SULFATE 90 UG/1
2 AEROSOL, METERED RESPIRATORY (INHALATION) EVERY 4 HOURS PRN
Status: DISCONTINUED | OUTPATIENT
Start: 2019-11-30 | End: 2019-12-02 | Stop reason: HOSPADM

## 2019-11-30 RX ORDER — BUSPIRONE HYDROCHLORIDE 10 MG/1
10 TABLET ORAL 2 TIMES DAILY
Status: DISCONTINUED | OUTPATIENT
Start: 2019-11-30 | End: 2019-12-02 | Stop reason: HOSPADM

## 2019-11-30 RX ORDER — FAMOTIDINE 20 MG/1
40 TABLET, FILM COATED ORAL EVERY MORNING
Status: DISCONTINUED | OUTPATIENT
Start: 2019-12-01 | End: 2019-12-02 | Stop reason: HOSPADM

## 2019-11-30 RX ORDER — POLYETHYLENE GLYCOL 3350 17 G/17G
1 POWDER, FOR SOLUTION ORAL
Status: DISCONTINUED | OUTPATIENT
Start: 2019-11-30 | End: 2019-12-02 | Stop reason: HOSPADM

## 2019-11-30 RX ORDER — OXYCODONE HYDROCHLORIDE 10 MG/1
10 TABLET ORAL
Status: DISCONTINUED | OUTPATIENT
Start: 2019-11-30 | End: 2019-12-01

## 2019-11-30 RX ADMIN — CALCIUM GLUCONATE 1 G: 98 INJECTION, SOLUTION INTRAVENOUS at 21:45

## 2019-11-30 RX ADMIN — TRAZODONE HYDROCHLORIDE 100 MG: 100 TABLET ORAL at 23:45

## 2019-11-30 RX ADMIN — METHOCARBAMOL TABLETS 750 MG: 750 TABLET, COATED ORAL at 23:46

## 2019-11-30 RX ADMIN — ONDANSETRON 4 MG: 4 TABLET, ORALLY DISINTEGRATING ORAL at 17:52

## 2019-11-30 RX ADMIN — PREGABALIN 300 MG: 100 CAPSULE ORAL at 23:46

## 2019-11-30 RX ADMIN — HEPARIN SODIUM 5000 UNITS: 5000 INJECTION, SOLUTION INTRAVENOUS; SUBCUTANEOUS at 23:47

## 2019-11-30 RX ADMIN — KETOROLAC TROMETHAMINE 15 MG: 30 INJECTION, SOLUTION INTRAMUSCULAR at 19:38

## 2019-11-30 RX ADMIN — BUSPIRONE HYDROCHLORIDE 10 MG: 10 TABLET ORAL at 23:46

## 2019-11-30 RX ADMIN — GABAPENTIN 300 MG: 300 CAPSULE ORAL at 23:47

## 2019-11-30 RX ADMIN — SENNOSIDES AND DOCUSATE SODIUM 2 TABLET: 8.6; 5 TABLET ORAL at 23:45

## 2019-11-30 RX ADMIN — MORPHINE SULFATE 4 MG: 4 INJECTION INTRAVENOUS at 23:14

## 2019-11-30 ASSESSMENT — COPD QUESTIONNAIRES
DURING THE PAST 4 WEEKS HOW MUCH DID YOU FEEL SHORT OF BREATH: SOME OF THE TIME
COPD SCREENING SCORE: 3
HAVE YOU SMOKED AT LEAST 100 CIGARETTES IN YOUR ENTIRE LIFE: NO/DON'T KNOW
DO YOU EVER COUGH UP ANY MUCUS OR PHLEGM?: YES, A FEW DAYS A WEEK OR MONTH

## 2019-11-30 ASSESSMENT — ENCOUNTER SYMPTOMS
PALPITATIONS: 0
HEMOPTYSIS: 0
PHOTOPHOBIA: 1
ABDOMINAL PAIN: 0
DOUBLE VISION: 0
NAUSEA: 1
SHORTNESS OF BREATH: 0
BRUISES/BLEEDS EASILY: 0
FOCAL WEAKNESS: 0
COUGH: 0
VOMITING: 1
EYE DISCHARGE: 0
SPEECH CHANGE: 0
FLANK PAIN: 0
HALLUCINATIONS: 0
MYALGIAS: 0
BLURRED VISION: 0
CHILLS: 0
HEADACHES: 1
FEVER: 0
HEARTBURN: 0
DEPRESSION: 0
DIZZINESS: 0
SENSORY CHANGE: 0
WEAKNESS: 0

## 2019-11-30 ASSESSMENT — LIFESTYLE VARIABLES
DO YOU DRINK ALCOHOL: NO
EVER_SMOKED: NEVER
SUBSTANCE_ABUSE: 0

## 2019-12-01 ENCOUNTER — APPOINTMENT (OUTPATIENT)
Dept: RADIOLOGY | Facility: MEDICAL CENTER | Age: 29
DRG: 103 | End: 2019-12-01
Attending: HOSPITALIST
Payer: MEDICARE

## 2019-12-01 LAB
ALBUMIN SERPL BCP-MCNC: 3.7 G/DL (ref 3.2–4.9)
ALBUMIN/GLOB SERPL: 2.1 G/DL
ALP SERPL-CCNC: 39 U/L (ref 30–99)
ALT SERPL-CCNC: 22 U/L (ref 2–50)
ANION GAP SERPL CALC-SCNC: 11 MMOL/L (ref 0–11.9)
AST SERPL-CCNC: 13 U/L (ref 12–45)
BASOPHILS # BLD AUTO: 0.5 % (ref 0–1.8)
BASOPHILS # BLD: 0.03 K/UL (ref 0–0.12)
BILIRUB SERPL-MCNC: 0.4 MG/DL (ref 0.1–1.5)
BUN SERPL-MCNC: 9 MG/DL (ref 8–22)
CALCIUM SERPL-MCNC: 8.8 MG/DL (ref 8.5–10.5)
CHLORIDE SERPL-SCNC: 110 MMOL/L (ref 96–112)
CO2 SERPL-SCNC: 19 MMOL/L (ref 20–33)
CREAT SERPL-MCNC: 0.73 MG/DL (ref 0.5–1.4)
EKG IMPRESSION: NORMAL
EOSINOPHIL # BLD AUTO: 0.11 K/UL (ref 0–0.51)
EOSINOPHIL NFR BLD: 1.9 % (ref 0–6.9)
ERYTHROCYTE [DISTWIDTH] IN BLOOD BY AUTOMATED COUNT: 42.6 FL (ref 35.9–50)
GLOBULIN SER CALC-MCNC: 1.8 G/DL (ref 1.9–3.5)
GLUCOSE BLD-MCNC: 89 MG/DL (ref 65–99)
GLUCOSE BLD-MCNC: 94 MG/DL (ref 65–99)
GLUCOSE BLD-MCNC: 94 MG/DL (ref 65–99)
GLUCOSE BLD-MCNC: 98 MG/DL (ref 65–99)
GLUCOSE SERPL-MCNC: 101 MG/DL (ref 65–99)
HCT VFR BLD AUTO: 36.7 % (ref 37–47)
HGB BLD-MCNC: 12.3 G/DL (ref 12–16)
IMM GRANULOCYTES # BLD AUTO: 0.02 K/UL (ref 0–0.11)
IMM GRANULOCYTES NFR BLD AUTO: 0.3 % (ref 0–0.9)
LYMPHOCYTES # BLD AUTO: 2.09 K/UL (ref 1–4.8)
LYMPHOCYTES NFR BLD: 36.3 % (ref 22–41)
MCH RBC QN AUTO: 31.1 PG (ref 27–33)
MCHC RBC AUTO-ENTMCNC: 33.5 G/DL (ref 33.6–35)
MCV RBC AUTO: 92.7 FL (ref 81.4–97.8)
MONOCYTES # BLD AUTO: 0.42 K/UL (ref 0–0.85)
MONOCYTES NFR BLD AUTO: 7.3 % (ref 0–13.4)
NEUTROPHILS # BLD AUTO: 3.08 K/UL (ref 2–7.15)
NEUTROPHILS NFR BLD: 53.7 % (ref 44–72)
NRBC # BLD AUTO: 0 K/UL
NRBC BLD-RTO: 0 /100 WBC
PLATELET # BLD AUTO: 166 K/UL (ref 164–446)
PMV BLD AUTO: 12.1 FL (ref 9–12.9)
POTASSIUM SERPL-SCNC: 4 MMOL/L (ref 3.6–5.5)
PROT SERPL-MCNC: 5.5 G/DL (ref 6–8.2)
RBC # BLD AUTO: 3.96 M/UL (ref 4.2–5.4)
SODIUM SERPL-SCNC: 140 MMOL/L (ref 135–145)
WBC # BLD AUTO: 5.8 K/UL (ref 4.8–10.8)

## 2019-12-01 PROCEDURE — 700111 HCHG RX REV CODE 636 W/ 250 OVERRIDE (IP): Performed by: HOSPITALIST

## 2019-12-01 PROCEDURE — 85025 COMPLETE CBC W/AUTO DIFF WBC: CPT

## 2019-12-01 PROCEDURE — 36415 COLL VENOUS BLD VENIPUNCTURE: CPT

## 2019-12-01 PROCEDURE — 99223 1ST HOSP IP/OBS HIGH 75: CPT | Mod: GC | Performed by: PSYCHIATRY & NEUROLOGY

## 2019-12-01 PROCEDURE — 700102 HCHG RX REV CODE 250 W/ 637 OVERRIDE(OP): Performed by: HOSPITALIST

## 2019-12-01 PROCEDURE — A9270 NON-COVERED ITEM OR SERVICE: HCPCS | Performed by: HOSPITALIST

## 2019-12-01 PROCEDURE — 93010 ELECTROCARDIOGRAM REPORT: CPT | Performed by: INTERNAL MEDICINE

## 2019-12-01 PROCEDURE — 770001 HCHG ROOM/CARE - MED/SURG/GYN PRIV*

## 2019-12-01 PROCEDURE — 96372 THER/PROPH/DIAG INJ SC/IM: CPT

## 2019-12-01 PROCEDURE — 93005 ELECTROCARDIOGRAM TRACING: CPT | Performed by: STUDENT IN AN ORGANIZED HEALTH CARE EDUCATION/TRAINING PROGRAM

## 2019-12-01 PROCEDURE — 700105 HCHG RX REV CODE 258: Performed by: HOSPITALIST

## 2019-12-01 PROCEDURE — 96376 TX/PRO/DX INJ SAME DRUG ADON: CPT

## 2019-12-01 PROCEDURE — 80053 COMPREHEN METABOLIC PANEL: CPT

## 2019-12-01 PROCEDURE — 82962 GLUCOSE BLOOD TEST: CPT

## 2019-12-01 PROCEDURE — 99232 SBSQ HOSP IP/OBS MODERATE 35: CPT | Performed by: HOSPITALIST

## 2019-12-01 PROCEDURE — 99222 1ST HOSP IP/OBS MODERATE 55: CPT | Performed by: PSYCHIATRY & NEUROLOGY

## 2019-12-01 RX ORDER — ACETAMINOPHEN 325 MG/1
650 TABLET ORAL EVERY 12 HOURS PRN
Status: DISCONTINUED | OUTPATIENT
Start: 2019-12-01 | End: 2019-12-02 | Stop reason: HOSPADM

## 2019-12-01 RX ORDER — ONDANSETRON 2 MG/ML
4 INJECTION INTRAMUSCULAR; INTRAVENOUS EVERY 4 HOURS PRN
Status: DISCONTINUED | OUTPATIENT
Start: 2019-12-01 | End: 2019-12-02 | Stop reason: HOSPADM

## 2019-12-01 RX ADMIN — HEPARIN SODIUM 5000 UNITS: 5000 INJECTION, SOLUTION INTRAVENOUS; SUBCUTANEOUS at 05:05

## 2019-12-01 RX ADMIN — BUSPIRONE HYDROCHLORIDE 10 MG: 10 TABLET ORAL at 05:01

## 2019-12-01 RX ADMIN — PREGABALIN 300 MG: 100 CAPSULE ORAL at 05:01

## 2019-12-01 RX ADMIN — FAMOTIDINE 40 MG: 20 TABLET ORAL at 05:03

## 2019-12-01 RX ADMIN — ZIPRASIDONE HYDROCHLORIDE 80 MG: 80 CAPSULE ORAL at 05:08

## 2019-12-01 RX ADMIN — ASPIRIN 81 MG: 81 TABLET, COATED ORAL at 05:01

## 2019-12-01 RX ADMIN — ONDANSETRON 4 MG: 2 INJECTION INTRAMUSCULAR; INTRAVENOUS at 15:26

## 2019-12-01 RX ADMIN — MORPHINE SULFATE 4 MG: 4 INJECTION INTRAVENOUS at 03:34

## 2019-12-01 RX ADMIN — METHOCARBAMOL TABLETS 750 MG: 750 TABLET, COATED ORAL at 17:19

## 2019-12-01 RX ADMIN — HEPARIN SODIUM 5000 UNITS: 5000 INJECTION, SOLUTION INTRAVENOUS; SUBCUTANEOUS at 21:28

## 2019-12-01 RX ADMIN — OXYCODONE HYDROCHLORIDE 10 MG: 10 TABLET ORAL at 01:03

## 2019-12-01 RX ADMIN — METHOCARBAMOL TABLETS 750 MG: 750 TABLET, COATED ORAL at 05:04

## 2019-12-01 RX ADMIN — MONTELUKAST 10 MG: 10 TABLET, FILM COATED ORAL at 05:05

## 2019-12-01 RX ADMIN — ZIPRASIDONE HYDROCHLORIDE 80 MG: 80 CAPSULE ORAL at 21:28

## 2019-12-01 RX ADMIN — LEVOTHYROXINE SODIUM 75 MCG: 75 TABLET ORAL at 05:03

## 2019-12-01 RX ADMIN — BUSPIRONE HYDROCHLORIDE 10 MG: 10 TABLET ORAL at 17:19

## 2019-12-01 RX ADMIN — PREDNISONE 10 MG: 10 TABLET ORAL at 05:05

## 2019-12-01 RX ADMIN — PREGABALIN 300 MG: 100 CAPSULE ORAL at 17:18

## 2019-12-01 RX ADMIN — GABAPENTIN 300 MG: 300 CAPSULE ORAL at 13:02

## 2019-12-01 RX ADMIN — HEPARIN SODIUM 5000 UNITS: 5000 INJECTION, SOLUTION INTRAVENOUS; SUBCUTANEOUS at 14:00

## 2019-12-01 RX ADMIN — METHOCARBAMOL TABLETS 750 MG: 750 TABLET, COATED ORAL at 11:45

## 2019-12-01 RX ADMIN — POTASSIUM CHLORIDE 40 MEQ: 1500 TABLET, EXTENDED RELEASE ORAL at 00:01

## 2019-12-01 RX ADMIN — ACETAMINOPHEN 650 MG: 325 TABLET, FILM COATED ORAL at 13:20

## 2019-12-01 RX ADMIN — GABAPENTIN 300 MG: 300 CAPSULE ORAL at 09:36

## 2019-12-01 RX ADMIN — SODIUM CHLORIDE: 9 INJECTION, SOLUTION INTRAVENOUS at 01:01

## 2019-12-01 RX ADMIN — OXYCODONE HYDROCHLORIDE 10 MG: 10 TABLET ORAL at 07:28

## 2019-12-01 RX ADMIN — ONDANSETRON 4 MG: 2 INJECTION INTRAMUSCULAR; INTRAVENOUS at 11:05

## 2019-12-01 RX ADMIN — GABAPENTIN 300 MG: 300 CAPSULE ORAL at 21:28

## 2019-12-01 RX ADMIN — SENNOSIDES AND DOCUSATE SODIUM 2 TABLET: 8.6; 5 TABLET ORAL at 17:19

## 2019-12-01 RX ADMIN — MYCOPHENOLATE MOFETIL 1000 MG: 250 CAPSULE ORAL at 18:54

## 2019-12-01 RX ADMIN — FLUOXETINE HYDROCHLORIDE 40 MG: 20 CAPSULE ORAL at 05:05

## 2019-12-01 RX ADMIN — MYCOPHENOLATE MOFETIL 1000 MG: 250 CAPSULE ORAL at 05:08

## 2019-12-01 RX ADMIN — TRAZODONE HYDROCHLORIDE 100 MG: 100 TABLET ORAL at 17:19

## 2019-12-01 RX ADMIN — GABAPENTIN 300 MG: 300 CAPSULE ORAL at 17:19

## 2019-12-01 RX ADMIN — ZIPRASIDONE HYDROCHLORIDE 80 MG: 80 CAPSULE ORAL at 00:14

## 2019-12-01 RX ADMIN — SENNOSIDES AND DOCUSATE SODIUM 2 TABLET: 8.6; 5 TABLET ORAL at 05:02

## 2019-12-01 RX ADMIN — MYCOPHENOLATE MOFETIL 1000 MG: 250 CAPSULE ORAL at 00:02

## 2019-12-01 ASSESSMENT — ENCOUNTER SYMPTOMS
SHORTNESS OF BREATH: 0
FEVER: 0
DIAPHORESIS: 0
WEAKNESS: 0
NAUSEA: 1
CONSTITUTIONAL NEGATIVE: 1
HEADACHES: 1
DEPRESSION: 0
BRUISES/BLEEDS EASILY: 0
ABDOMINAL PAIN: 0
VOMITING: 0
COUGH: 0
FOCAL WEAKNESS: 0
RESPIRATORY NEGATIVE: 1
WEIGHT LOSS: 0
MUSCULOSKELETAL NEGATIVE: 1
PALPITATIONS: 0
EYES NEGATIVE: 1
MYALGIAS: 0
HALLUCINATIONS: 1
BLURRED VISION: 0
CARDIOVASCULAR NEGATIVE: 1
DIZZINESS: 0
CHILLS: 0
SORE THROAT: 0

## 2019-12-01 ASSESSMENT — LIFESTYLE VARIABLES: SUBSTANCE_ABUSE: 0

## 2019-12-01 NOTE — PROGRESS NOTES
"Hospital Medicine Daily Progress Note    Date of Service  12/1/2019    Chief Complaint  29 y.o. female admitted 11/30/2019 with head trauma    Hospital Course    Patient is a 29 y.o. female who presented 11/30/2019 with past medical history of asthma, borderline personality disorder, diabetes, TONYA, PCOS, morbid obesity, schizophrenia who presents with ground-level fall and head trauma.  This patient slipped and fell in the hallway at home.  She had no preceding symptoms.  She states she fell backwards and struck the back of her head.      Interval Problem Update  She reports mild intermittent nausea, no emesis, headache 4/10, no photophobia, she states she continues to see \"lights\" but only when she closes her eyes.  She reports \"auditory hallucinations\" she states she is hearing \"thunder\" and that this is different than her normal auditory hallucinations which are generally voices.  The headache is \"dull and aching, in the back\" also reports chronic back pain  Denies numbness or tingling, denies SI, denies sob  Ros otherwise negative    Consultants/Specialty  Psych  Neuro    Code Status  full    Disposition  tbd    Review of Systems  Review of Systems   Constitutional: Negative.  Negative for chills, diaphoresis, fever, malaise/fatigue and weight loss.   HENT: Negative.  Negative for sore throat.    Eyes: Negative.  Negative for blurred vision.   Respiratory: Negative.  Negative for cough and shortness of breath.    Cardiovascular: Negative.  Negative for chest pain, palpitations and leg swelling.   Gastrointestinal: Positive for nausea. Negative for abdominal pain and vomiting.   Genitourinary: Negative.  Negative for dysuria.   Musculoskeletal: Negative.  Negative for myalgias.   Skin: Negative.  Negative for itching and rash.   Neurological: Positive for headaches. Negative for dizziness, focal weakness and weakness.   Endo/Heme/Allergies: Negative.  Does not bruise/bleed easily.   Psychiatric/Behavioral: " Positive for hallucinations. Negative for depression, substance abuse and suicidal ideas.   All other systems reviewed and are negative.       Physical Exam  Temp:  [36.3 °C (97.4 °F)-36.5 °C (97.7 °F)] 36.4 °C (97.5 °F)  Pulse:  [64-92] 90  Resp:  [12-18] 18  BP: ()/(49-80) 113/58  SpO2:  [91 %-97 %] 91 %    Physical Exam  Vitals signs and nursing note reviewed. Exam conducted with a chaperone present.   Constitutional:       General: She is not in acute distress.     Appearance: Normal appearance. She is obese. She is not diaphoretic.   HENT:      Head: Normocephalic.      Nose: Nose normal.      Mouth/Throat:      Mouth: Mucous membranes are moist.   Eyes:      Pupils: Pupils are equal, round, and reactive to light.   Cardiovascular:      Rate and Rhythm: Normal rate and regular rhythm.      Pulses: Normal pulses.      Heart sounds: Normal heart sounds.   Pulmonary:      Effort: Pulmonary effort is normal.      Breath sounds: Normal breath sounds.   Abdominal:      General: Abdomen is flat. Bowel sounds are normal.      Palpations: Abdomen is soft.   Musculoskeletal: Normal range of motion.         General: No swelling or deformity.   Skin:     General: Skin is warm and dry.      Capillary Refill: Capillary refill takes less than 2 seconds.   Neurological:      General: No focal deficit present.      Mental Status: She is alert and oriented to person, place, and time.      Cranial Nerves: No cranial nerve deficit.   Psychiatric:         Attention and Perception: She is inattentive. She perceives auditory and visual hallucinations.         Mood and Affect: Affect is blunt and flat.         Speech: Speech is tangential.         Behavior: Behavior is withdrawn.         Thought Content: Thought content is not paranoid. Thought content does not include homicidal or suicidal ideation.         Fluids    Intake/Output Summary (Last 24 hours) at 12/1/2019 1251  Last data filed at 12/1/2019 0800  Gross per 24 hour    Intake 240 ml   Output --   Net 240 ml       Laboratory  Recent Labs     11/30/19 2010 12/01/19  0319   WBC 6.3 5.8   RBC 3.93* 3.96*   HEMOGLOBIN 12.0 12.3   HEMATOCRIT 36.4* 36.7*   MCV 92.6 92.7   MCH 30.5 31.1   MCHC 33.0* 33.5*   RDW 42.5 42.6   PLATELETCT 173 166   MPV 11.9 12.1     Recent Labs     11/30/19 2010 12/01/19 0319   SODIUM 143 140   POTASSIUM 3.1* 4.0   CHLORIDE 117* 110   CO2 17* 19*   GLUCOSE 83 101*   BUN 8 9   CREATININE 0.62 0.73   CALCIUM 7.3* 8.8                   Imaging  IR-US GUIDED PIV   Final Result    Ultrasound-guided PERIPHERAL IV INSERTION performed by    qualified nursing staff as above.            CT-HEAD W/O   Final Result      No evidence of acute intracranial process.      DX-LUMBAR SPINE-2 OR 3 VIEWS   Final Result      1.  No evidence of fracture.      2.  Surgical change.      3.  Mild degenerative change.      DX-THORACIC SPINE-2 VIEWS   Final Result      No evidence of fracture or dislocation.           Assessment/Plan  * Closed head injury  Assessment & Plan  Closed head injury, with reported changes in mentation   Non specific  She tells me she is confused but is axox3  Query post -concussive syndrome  Neuro following  Unable to get MRI  DC narcotics as likely contributing to headache and confusion      Hypokalemia  Assessment & Plan  Resolved    TONYA (obstructive sleep apnea)- (present on admission)  Assessment & Plan  Continue cpap      Morbidly obese ([facility])- (present on admission)  Assessment & Plan  Weight loss program encouraged as outpatient    Hypocalcemia  Assessment & Plan  resolved    Moderate intermittent asthma without complication- (present on admission)  Assessment & Plan  Not in acute exacerbation   resp care protocol ordered   Resume home montelukast     Acquired hypothyroidism- (present on admission)  Assessment & Plan  continue home levothyroxine     Diabetes type 2, controlled ([facility])- (present on admission)  Assessment & Plan  Continue SSI  accu  "checks     Schizophrenia ([facility])- (present on admission)  Assessment & Plan  Could be contributing to presenting sx  Reports auditory and visual hallucinations  Psych to eval    GERD (gastroesophageal reflux disease)- (present on admission)  Assessment & Plan  Continue home pepcid   No current sx    PCOS (polycystic ovarian syndrome)- (present on admission)  Assessment & Plan  continue home medications       Borderline personality disorder in adult ([facility])- (present on admission)  Assessment & Plan  Continue home medications  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

## 2019-12-01 NOTE — ED NOTES
Patient awake alert and oriented x 4, Glascow 15, bed in low position, call light within reach, on room air, attached to cardiac monitor, unlabored breathing noted, no cough noted, interacts with staff, interactions noted as appropriate, family at bedside.

## 2019-12-01 NOTE — ASSESSMENT & PLAN NOTE
Closed head injury, with reported changes in mentation   Non specific  She tells me she is confused but is axox3  Query post -concussive syndrome  Neuro following  Unable to get MRI  DC narcotics as likely contributing to headache and confusion

## 2019-12-01 NOTE — ED TRIAGE NOTES
"Pt brought in by ems reporting pt was walking down the hallway and slipped and fell. Pt hit back of head and family reports pt is now acting altered. Pt is developmentally delayed. When asked to open eyes pt says \"they are open\" while eyes are closed. When asked the year pt reports she does not know. Unknown baseline. Family on their way.  "
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
## 2019-12-01 NOTE — THERAPY
Occupational Therapy Contact Note:    OT orders received, upon arrival pt had just had a large amount of emisis. Will hold and round back as able.       Ignacia Jones,  Pager: [phone]

## 2019-12-01 NOTE — ED NOTES
Med Rec Updated and Complete per Pt at bedside   Allergies Reviewed  No PO ABX last 14 days.    Pt reports she has not started taking the script for Oxycodone given to her upon her most recent discharge.    Pt taking LD ASA daily.    Pt also reports she is not carrying her Corlanor on her person, but if necessary, she can have it brought to her by a family member.    Pt taking a maintenance dose of Prednisone 10mg daily.

## 2019-12-01 NOTE — ED PROVIDER NOTES
"ED Provider Note    Scribed for Jim Sandhu D.O. by Laron Chandler. 11/30/2019  5:36 PM    Primary care provider: Torres Brody M.D.  Means of arrival: EMS  History obtained from: patient  History limited by: none    CHIEF COMPLAINT  Chief Complaint   Patient presents with   • Fall       HPI  Kristin Balderrama is a 29 y.o. female with a history of a developmental delay who presents to the Emergency Department via EMS secondary to a slip and fall in a hallway at home. She was reported to hit the back of her head. She was also having a moderate headache, nausea, back pain and dyspnea following the fall. Her back pain is exacerbated by moving her legs. She is accompanied by her family who state that the patient is not acting normally. Further history of present illness cannot be obtained due to the patient's AMS.    REVIEW OF SYSTEMS  Pertinent positives include ground level trip/fall, headache, nausea, back pain, and dyspnea. Further ROS cannot be obtained due to the patient's AMS.       PAST MEDICAL HISTORY  Past Medical History:   Diagnosis Date   • Abdominal pain    • Anginal syndrome     random chest pain especially with tachycardia   • Apnea, sleep    • Arrhythmia     \"sinus tachycardia\", cariologist, Dr. Kumar; ablation 2/2016   • Arthritis     osteo   • ASTHMA     when around smoke   • Atrial fibrillation (HCC)    • Back pain    • Borderline personality disorder (HCC)    • Breath shortness     with tachycardia   • Bronchitis    • Cardiac arrhythmia    • Chickenpox    • Chronic UTI 9/18/2010   • Cough    • Daytime sleepiness    • Depression    • Diabetes (HCC)    • Diarrhea    • Disorder of thyroid    • Fall    • Fatigue    • Frequent headaches    • Gasping for breath    • Gynecological disorder     PCOS   • Headache(784.0)    • Heart burn    • History of falling    • Hypertension    • Indigestion    • Migraine    • 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HCC)    • 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HCC)    • Nausea  " "  • Obesity    • Pain 08-15-12    back, 7/10   • Painful joint    • PCOS (polycystic ovarian syndrome)    • Pneumonia 2012   • Psychosis (HCC)    • Renal disorder     \"kidney disease, stage 1\" nephrologist, Dr. 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   • Urinary bladder disorder     Suprapubic cath   • Urinary incontinence    • Weakness    • Wears glasses        SURGICAL HISTORY  Past Surgical History:   Procedure Laterality Date   • MUSCLE BIOPSY Right 1/26/2017    Procedure: MUSCLE BIOPSY - THIGH;  Surgeon: Isidro Vigil M.D.;  Location: SURGERY [facility];  Service:    • GASTROSCOPY WITH BALLOON DILATATION N/A 1/4/2017    Procedure: GASTROSCOPY WITH DILATATION;  Surgeon: Torres Vargas M.D.;  Location: [facility];  Service:    • BOWEL STIMULATOR INSERTION  7/13/2016    Procedure: BOWEL STIMULATOR INSERTION FOR PERMANENT INTERSTIM SACRAL IMPLANT;  Surgeon: Joe Noyola M.D.;  Location: SURGERY [facility];  Service:    • RECOVERY  1/27/2016    Procedure: CATH LAB EP STUDY WITH SINUS NODE MODIFICATION ABHINAV;  Surgeon: Recoveryonly Surgery;  [place] [facility] [place]  Service:    • OTHER CARDIAC SURGERY  1/2016    cardiac ablation   • NEURO DEST FACET L/S W/IG SNGL  4/21/2015    Performed by Reza Tabor at [facility]   • LUMBAR FUSION ANTERIOR  8/21/2012    Performed by SUSIE SAWANT at [facility]   • ALYSSA BY LAPAROSCOPY  8/29/2010    Performed by SHAYY JOHNS at [facility]   • LAMINOTOMY     • OTHER ABDOMINAL SURGERY     • TONSILLECTOMY      tonsillectomy        SOCIAL HISTORY  Social History     Tobacco Use   • Smoking status: Never Smoker   • Smokeless tobacco: Never Used   Substance Use Topics   • " Alcohol use: No     Alcohol/week: 0.0 oz   • Drug use: Yes     Frequency: 7.0 times per week     Types: Marijuana, Oral     Comment: Medicinal edible's      Social History     Substance and Sexual Activity   Drug Use Yes   • Frequency: 7.0 times per week   • Types: Marijuana, Oral    Comment: Medicinal edible's       FAMILY HISTORY  Family History   Problem Relation Age of Onset   • Hypertension Mother    • Sleep Apnea Mother    • Heart Disease Mother    • Other Mother         hypothryod   • Hypertension Maternal Uncle    • Heart Disease Maternal Grandmother    • Hypertension Maternal Grandmother    • No Known Problems Sister    • Other Sister         Narcolepsy;fibromyalsia;bone;nerve   • Genitourinary () Problems Sister         endometriosis       CURRENT MEDICATIONS  Current Outpatient Medications:   •  Blood Glucose Test Strips, Test strips order: Test strips for care chest meter. Sig: use bid, Disp: 100 Each, Rfl: 11  •  Lancets, Lancets order: Lancets for diabetic testing meter. Sig: use bid, Disp: 100 Each, Rfl: 11  •  FLUoxetine (PROZAC) 40 MG capsule, Take 1 Cap by mouth every day., Disp: , Rfl:   •  aspirin EC (ECOTRIN) 81 MG Tablet Delayed Response, Take 81 mg by mouth every day., Disp: , Rfl:   •  busPIRone (BUSPAR) 10 MG Tab tablet, Take 10 mg by mouth 2 times a day., Disp: , Rfl:   •  levothyroxine (SYNTHROID) 75 MCG Tab, Take 75 mcg by mouth Every morning on an empty stomach., Disp: , Rfl:   •  pregabalin (LYRICA) 300 MG capsule, Take 300 mg by mouth 2 Times a Day., Disp: , Rfl:   •  montelukast (SINGULAIR) 10 MG Tab, Take 10 mg by mouth every day., Disp: , Rfl:   •  ondansetron (ZOFRAN ODT) 4 MG TABLET DISPERSIBLE, Take 4 mg by mouth every 6 hours as needed for Nausea., Disp: , Rfl:   •  potassium chloride SA (KDUR) 20 MEQ Tab CR, Take 20 mEq by mouth 2 times a day., Disp: , Rfl:   •  ziprasidone (GEODON) 80 MG Cap, Take 80 mg by mouth 2 Times a Day., Disp: , Rfl:   •  traZODone (DESYREL) 100 MG  Tab, Take 100 mg by mouth every evening., Disp: , Rfl:   •  predniSONE (DELTASONE) 10 MG Tab, Take 10 mg by mouth every morning. Maintenance Medication., Disp: , Rfl:   •  ranitidine (ZANTAC) 300 MG tablet, Take 300 mg by mouth every morning., Disp: , Rfl:   •  diphenhydrAMINE-APAP, sleep, (TYLENOL PM EXTRA STRENGTH)  MG Tab, Take 2 Tabs by mouth every evening., Disp: , Rfl:   •  Diclofenac Sodium (VOLTAREN) 1 % Gel, Apply 1 Application to skin as directed 4 times a day as needed (on wrists, back, ankles, and feet)., Disp: , Rfl:   •  tiotropium (SPIRIVA) 18 MCG Cap, Inhale 1 Cap by mouth every day., Disp: 90 Cap, Rfl: 3  •  folic acid (FOLVITE) 800 MCG tablet, Take 800 mg by mouth every day., Disp: , Rfl:   •  Ivabradine HCl (CORLANOR) 7.5 MG Tab, Take 7.5 mg by mouth 2 Times a Day., Disp: , Rfl:   •  ipratropium-albuterol (DUONEB) 0.5-2.5 (3) MG/3ML nebulizer solution, 3 mL by Nebulization route every 6 hours as needed for Shortness of Breath., Disp: 60 Bullet, Rfl: 1  •  etonogestrel (NEXPLANON) 68 MG Implant implant, Inject 1 Each as instructed Once., Disp: , Rfl:   •  mycophenolate (CELLCEPT) 500 MG tablet, Take 1,000 mg by mouth 2 times a day., Disp: , Rfl:   •  gabapentin (NEURONTIN) 300 MG Cap, Take 300 mg by mouth 4 times a day., Disp: , Rfl:   •  Dulaglutide (TRULICITY) 1.5 MG/0.5ML Solution Pen-injector, Inject 1.5 mg as instructed every Friday., Disp: , Rfl:   •  sodium bicarbonate 325 MG Tab, Take 650 mg by mouth 2 Times a Day., Disp: , Rfl:   •  albuterol 108 (90 Base) MCG/ACT Aero Soln inhalation aerosol, Inhale 2 Puffs by mouth every 6 hours as needed for Shortness of Breath., Disp: , Rfl:   •  Melatonin 5 MG Tab, Take 10 mg by mouth every evening., Disp: , Rfl:   •  furosemide (LASIX) 80 MG Tab, Take 80 mg by mouth every day., Disp: , Rfl:   •  methocarbamol (ROBAXIN) 750 MG Tab, Take 750 mg by mouth 3 times a day., Disp: , Rfl:      ALLERGIES  Allergies   Allergen Reactions   • Cefdinir  "Shortness of Breath and Itching     Tolerated 1/18/17  Tolerates ceftriaxone    • Depakote [Divalproex Sodium] Unspecified     Muscle spasms/muscle pain and weakness     • Doxycycline Anaphylaxis and Vomiting     RXN=unknown   • Amitriptyline Unspecified     Headaches     • Aripiprazole [Abilify] Unspecified     Headaches/muscle twitching     • Clindamycin Nausea     Even with food     • Ees [Erythromycin] Vomiting and Nausea   • Flagyl [Metronidazole Hcl] Unspecified     \"eye problems\"     • Flomax [Tamsulosin Hydrochloride] Swelling   • Levaquin Unspecified     Severe muscle cramps in legs  RXN=unknown   • Metformin Unspecified     Increased lactic acid      • Tape Rash     Tears skin off  coban with Tegaderm tape ok intermittently  RXN=ongoing   • Vancomycin Itching     Pt becomes flushed in face and chest.   RXN=7/10/16   • Wound Dressing Adhesive Hives     By pt report   • Cephalexin [Keflex] Rash     Pt states she gets a rash with this medication  Tolerates ceftriaxone   • Divalproex Sodium [Valproic Acid] Rash     .   • Erythromycin Rash     .   • Levofloxacin Unspecified     Leg muscle cramps   • Metronidazole Rash     .   • Tamsulosin Hcl        PHYSICAL EXAM  VITAL SIGNS: /79   Pulse 67   Temp 36.4 °C (97.5 °F) (Temporal)   Resp 12   Ht 1.651 m (5' 5\")   Wt (!) 170.1 kg (375 lb)   LMP 07/01/2019 (LMP Unknown)   SpO2 97%   BMI 62.40 kg/m²     Nursing notes and vitals reviewed.  Constitutional: Well developed, Well nourished, No acute distress, Non-toxic appearance.   Eyes: PERRLA, EOMI, Conjunctiva normal, No discharge.   Cardiovascular: Normal heart rate, Normal rhythm, No murmurs, No rubs, No gallops.   Thorax & Lungs: No respiratory distress, No rales, No rhonchi, No wheezing, No chest tenderness.   Abdomen: Bowel sounds normal, Soft, No tenderness, No guarding, No rebound, No masses, No pulsatile masses.   Skin: Warm, Dry, No erythema, No rash.   Musculoskeletal: Intact distal pulses, No " edema, No cyanosis, No clubbing. Good range of motion in all major joints. No tenderness to palpation or major deformities noted, no CVA tenderness, no midline back tenderness.   Neurologic: Alert & oriented x 3, patient is able to lift her arms and legs with strength 5/5 upper lower extremes bilaterally, sensation intact upper lower Extre's bilaterally, DTRs are 2/4 lower extremes bladder  Psychiatric: Affect normal for clinical presentation.    DIAGNOSTIC STUDIES/PROCEDURES    LABS    All labs reviewed by me.    RADIOLOGY  CT-HEAD W/O   Final Result      No evidence of acute intracranial process.      DX-LUMBAR SPINE-2 OR 3 VIEWS   Final Result      1.  No evidence of fracture.      2.  Surgical change.      3.  Mild degenerative change.      DX-THORACIC SPINE-2 VIEWS   Final Result      No evidence of fracture or dislocation.        The radiologist's interpretation of all radiological studies have been reviewed by me.    COURSE & MEDICAL DECISION MAKING  Pertinent Labs & Imaging studies reviewed. (See chart for details)    5:36 PM - Patient seen and examined at bedside. Patient will be treated with Zofran 4 mg. Ordered CT-head, dx-thoracic spine, and dx-lumbar spine to evaluate her symptoms.     This is a charming 29 y.o. female that presents after having a ground-level fall resulting in profound nausea, vomiting.  The patient no loss of consciousness she has no focal neurological deficits with strength and sensation intact upper lower extremities.  She is slightly confused on my evaluation and vomiting therefore CT scan of the brain was completed.  CT scan of the brain was negative for intracranial hemorrhage.  She also complained of thoracic and lumbar spine tenderness.  She has no step-off deformities and x-rays were negative for fracture.  She had no cervical spine tenderness or step-off deformity.  The patient continued to have nausea and vomiting after receiving Zofran and seeing flashing lights and do  believe she has a significant closed head injury.  We have observed her for several hours and I do not believe the patient will be able to be discharged safely home.  For this reason, I discussed the patient Dr. Keith, who graciously will hospitalize the patient for further evaluation and management of her closed head injury, back strain.  The patient does not have evidence of cauda equina syndrome, is not have evidence of a surgical condition currently.    FINAL IMPRESSION  Closed head injury  Thoracic contusion  Lumbar contusion   ILaron (Scribe), am scribing for, and in the presence of, Jim Sandhu D.O    Electronically signed by: Laron Chandler (Carolibe), 11/30/2019    IJim D.O. personally performed the services described in this documentation, as scribed by Laron Chandler in my presence, and it is both accurate and complete. C    The note accurately reflects work and decisions made by me.  Jim Sandhu  11/30/2019  8:27 PM

## 2019-12-01 NOTE — DIETARY
"NUTRITION SERVICES: BMI - Pt with BMI >40 (=Body mass index is 62.4 kg/m².), morbid obesity. Wt is 170.1 kg per bedscale on 11/30, ht is 1.65 m (5'5\"). Weight loss counseling not appropriate in acute care setting. Pt with wounds to breast and abd, no wound consult entered and wounds appear to be small scabs/sores per pictures on 11/7.     RECOMMEND -   1) Referral to outpatient nutrition services for weight management after D/C.   2) Consult wound care PRN for wounds as needed.    RD available PRN.     "

## 2019-12-01 NOTE — ED NOTES
Family updated that physician would not like pt to have any pain medication until CT has resulted due to the reports of altered mentation.

## 2019-12-01 NOTE — ED NOTES
Awaiting MD. Family at bedside. Pt placed on monitor. NSR on monitor. Pt not oriented which is not normal as reported by family. Concepcion Novoa RN notified face to face at 1718. Will continue to monitor.

## 2019-12-01 NOTE — ASSESSMENT & PLAN NOTE
"Continue home medications  Reported h/o \"multiple personality disorder with hallucinations\" could be contributing  Psych to eval  "

## 2019-12-01 NOTE — CONSULTS
Neurology Initial Consult H&P  Neurohospitalist Service, [facility]    Referring Physician: Lucía Gil M.D.    Chief Complaint   Patient presents with   • Fall       HPI: 29-year-old female who suffered a fall from standing yesterday.  Unclear she had loss of consciousness but she does have severe memory loss from the event.  CT head yesterday in the ER was unremarkable.  Today she continues to have throbbing sharp-like pain bilaterally.  She has vision changes seen stars at times.  She also complains of vertigo-like sensation when she moves too much.  She has a history of migraine headaches in the past but has not had one in many years recently.  No new focal numbness or weakness.  She has a very, he denies any history.  One is chronic relapsing bilateral optic neuritis in which he sees neuropathy ophthalmology for she is currently on CellCept and prednisone for this.  She had multiple admissions for IV Solu-Medrol for flareups last one being in May 2019.  She had a negative NMO and anti MOG antibodies in the past.  She states that her vision changes today are not like her other optic neuritis episodes in the past.  She cannot have an MRI brain due to a spinal stimulator placed in the lumbar spine.  Patient also suffers significant amount of psychiatry disorders including borderline and depression she is on multiple medications for this.  She also has chronic pain disorder she is on Lyrica and Neurontin for this also.    Review of systems: In addition to what is detailed in the HPI above, (and scanned into the chart if and when applicable), all other systems reviewed and are negative.    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    FHx:  family history includes Genitourinary () Problems in her sister; Heart Disease in her maternal grandmother and mother; Hypertension in her maternal grandmother, maternal uncle, and mother; No Known Problems in her sister; Other in her mother and sister; Sleep Apnea in her mother.    SHx:   reports that she has never smoked. She has never used smokeless tobacco. She reports current drug use. Frequency: 7.00 times per week. Drugs: Marijuana and Oral. She reports that she does not drink alcohol.    Allergies:  Allergies   Allergen Reactions   • Cefdinir Shortness of Breath and Itching     Tolerated 1/18/17  Tolerates ceftriaxone    • Depakote [Divalproex Sodium] Unspecified     Muscle spasms/muscle pain and weakness     • Doxycycline Anaphylaxis and Vomiting     RXN=unknown   • Amitriptyline Unspecified     Headaches     • Aripiprazole [Abilify] Unspecified     Headaches/muscle twitching     • Clindamycin Nausea     Even with food     • Ees [Erythromycin] Vomiting and Nausea   • Flagyl [Metronidazole Hcl] Unspecified     \"eye problems\"     • Flomax [Tamsulosin Hydrochloride] Swelling   • Levaquin Unspecified     Severe muscle cramps in legs  RXN=unknown   • Metformin Unspecified     Increased lactic acid      • Tape Rash     Tears skin off  coban with Tegaderm tape ok " intermittently  RXN=ongoing   • Vancomycin Itching     Pt becomes flushed in face and chest.   RXN=7/10/16   • Wound Dressing Adhesive Hives     By pt report   • Cephalexin [Keflex] Rash     Pt states she gets a rash with this medication  Tolerates ceftriaxone   • Divalproex Sodium [Valproic Acid] Rash     .   • Erythromycin Rash     .   • Levofloxacin Unspecified     Leg muscle cramps   • Metronidazole Rash     .   • Tamsulosin Hcl        Medications:    Current Facility-Administered Medications:   •  ondansetron (ZOFRAN) syringe/vial injection 4 mg, 4 mg, Intravenous, Q4HRS PRN, Lucía Gil M.D., 4 mg at 12/01/19 1105  •  senna-docusate (PERICOLACE or SENOKOT S) 8.6-50 MG per tablet 2 Tab, 2 Tab, Oral, BID, 2 Tab at 12/01/19 0502 **AND** polyethylene glycol/lytes (MIRALAX) PACKET 1 Packet, 1 Packet, Oral, QDAY PRN **AND** magnesium hydroxide (MILK OF MAGNESIA) suspension 30 mL, 30 mL, Oral, QDAY PRN **AND** bisacodyl (DULCOLAX) suppository 10 mg, 10 mg, Rectal, QDAY PRN, Sri Keith M.D.  •  NS infusion, , Intravenous, Continuous, Sri Keith M.D., Last Rate: 100 mL/hr at 12/01/19 0101  •  heparin injection 5,000 Units, 5,000 Units, Subcutaneous, Q8HRS, Sri Keith M.D., 5,000 Units at 12/01/19 0505  •  Notify provider if pain remains uncontrolled, , , CONTINUOUS **AND** Use the numeric rating scale (NRS-11) on regular floors and Critical-Care Pain Observation Tool (CPOT) on ICUs/Trauma to assess pain, , , CONTINUOUS **AND** Pulse Ox (Oximetry), , , CONTINUOUS **AND** Pharmacy Consult Request ...Pain Management Review 1 Each, 1 Each, Other, PHARMACY TO DOSE **AND** If patient difficult to arouse and/or has respiratory depression, stop any opiates that are currently infusing and call a Rapid Response., , , CONTINUOUS **AND** [DISCONTINUED] oxyCODONE immediate-release (ROXICODONE) tablet 5 mg, 5 mg, Oral, Q3HRS PRN **AND** [DISCONTINUED] oxyCODONE immediate-release (ROXICODONE) tablet 10 mg, 10  mg, Oral, Q3HRS PRN, 10 mg at 12/01/19 0728 **AND** [DISCONTINUED] morphine (pf) 4 MG/ML injection 4 mg, 4 mg, Intravenous, Q3HRS PRN, Sri Keith M.D., 4 mg at 12/01/19 0334  •  aspirin EC (ECOTRIN) tablet 81 mg, 81 mg, Oral, DAILY, Sri Keith M.D., 81 mg at 12/01/19 0501  •  Respiratory Care per Protocol, , Nebulization, Continuous RT, Sri Keith M.D.  •  busPIRone (BUSPAR) tablet 10 mg, 10 mg, Oral, BID, Sri Keith M.D., 10 mg at 12/01/19 0501  •  FLUoxetine (PROZAC) capsule 40 mg, 40 mg, Oral, DAILY, Sri Keith M.D., 40 mg at 12/01/19 0505  •  gabapentin (NEURONTIN) capsule 300 mg, 300 mg, Oral, 4X/DAY, Sri Keith M.D., 300 mg at 12/01/19 0936  •  ipratropium-albuterol (DUONEB) nebulizer solution, 3 mL, Nebulization, Q6HRS PRN, Sri Keith M.D.  •  levothyroxine (SYNTHROID) tablet 75 mcg, 75 mcg, Oral, AM ES, Sri Keith M.D., 75 mcg at 12/01/19 0503  •  methocarbamol (ROBAXIN) tablet 750 mg, 750 mg, Oral, TID, Sri Keith M.D., 750 mg at 12/01/19 1145  •  montelukast (SINGULAIR) tablet 10 mg, 10 mg, Oral, DAILY, Sri Keith M.D., 10 mg at 12/01/19 0505  •  mycophenolate (CELLCEPT) capsule 1,000 mg, 1,000 mg, Oral, BID, Sri Keith M.D., 1,000 mg at 12/01/19 0508  •  predniSONE (DELTASONE) tablet 10 mg, 10 mg, Oral, QAM, Sri Keith M.D., 10 mg at 12/01/19 0505  •  pregabalin (LYRICA) capsule 300 mg, 300 mg, Oral, BID, Sri Keith M.D., 300 mg at 12/01/19 0501  •  famotidine (PEPCID) tablet 40 mg, 40 mg, Oral, QAM, Sri Keith M.D., 40 mg at 12/01/19 0503  •  ziprasidone (GEODON) capsule 80 mg, 80 mg, Oral, BID, Sri Keith M.D., 80 mg at 12/01/19 0508  •  traZODone (DESYREL) tablet 100 mg, 100 mg, Oral, Q EVENING, Sri Keith M.D., 100 mg at 11/30/19 2345  •  insulin regular (HUMULIN R) injection 1-6 Units, 1-6 Units, Subcutaneous, Q6HRS, Stopped at 12/01/19 0000 **AND** Accu-Chek Q6 if NPO, , , Q6H **AND**  NOTIFY MD and PharmD, , , Once **AND** glucose 4 g chewable tablet 16 g, 16 g, Oral, Q15 MIN PRN **AND** DEXTROSE 10% BOLUS 250 mL, 250 mL, Intravenous, Q15 MIN PRN, Sri Keith M.D.  •  albuterol inhaler 2 Puff, 2 Puff, Inhalation, Q4HRS PRN, Sri Keith M.D.    Physical Examination:     Vitals:    11/30/19 2305 12/01/19 0401 12/01/19 0503 12/01/19 0800   BP:   (!) 98/49 113/58   Pulse: 67 92  90   Resp: 15 17  18   Temp:  36.3 °C (97.4 °F)  36.4 °C (97.5 °F)   TempSrc:  Temporal  Temporal   SpO2: 94% 93%  91%   Weight:       Height:           General: Patient is awake and in no acute distress  Eyes: examination of optic disks not indicated at this time  CV: RRR    NEUROLOGICAL EXAM:     Mental status: Patient is awake alert she knows recently now she also date.  We came the room she appeared to be resting no obvious signs of pain.  Speech and language: speech is clear and fluent. The patient is able to name and repeat.  Cranial nerve exam: Pupils are equal, round and reactive to light bilaterally. Visual fields are full. Extraocular muscles are intact. Sensation in the face is intact to light touch. Face is symmetric. Hearing to finger rub equal. Palate elevates symmetrically. Shoulder shrug is full. Tongue is midline.  Motor exam: She has very poor effort in all 4 extremities but appears to be normal.  She does have a sling about edema and skin breakdown in the lower extremities.  Sensory exam: No sensory deficits identified   Deep tendon reflexes: Areflexic bilateral patellar's possible 1+ of bilateral biceps toes are equal bilaterally.  Coordination: no ataxia   Gait: deferred patient is currently bedbound.    Objective Data:    Labs:  Lab Results   Component Value Date/Time    PROTHROMBTM 12.7 11/06/2019 03:30 PM    INR 0.93 11/06/2019 03:30 PM      Lab Results   Component Value Date/Time    WBC 5.8 12/01/2019 03:19 AM    WBC 6.1 07/20/2010 11:00 AM    RBC 3.96 (L) 12/01/2019 03:19 AM    RBC 4.38  07/20/2010 11:00 AM    HEMOGLOBIN 12.3 12/01/2019 03:19 AM    HEMATOCRIT 36.7 (L) 12/01/2019 03:19 AM    MCV 92.7 12/01/2019 03:19 AM    MCV 93 07/20/2010 11:00 AM    MCH 31.1 12/01/2019 03:19 AM    MCH 30.1 07/20/2010 11:00 AM    MCHC 33.5 (L) 12/01/2019 03:19 AM    MPV 12.1 12/01/2019 03:19 AM    NEUTSPOLYS 53.70 12/01/2019 03:19 AM    LYMPHOCYTES 36.30 12/01/2019 03:19 AM    MONOCYTES 7.30 12/01/2019 03:19 AM    EOSINOPHILS 1.90 12/01/2019 03:19 AM    BASOPHILS 0.50 12/01/2019 03:19 AM    ANISOCYTOSIS 1+ 07/08/2019 04:04 PM      Lab Results   Component Value Date/Time    SODIUM 140 12/01/2019 03:19 AM    POTASSIUM 4.0 12/01/2019 03:19 AM    CHLORIDE 110 12/01/2019 03:19 AM    CO2 19 (L) 12/01/2019 03:19 AM    GLUCOSE 101 (H) 12/01/2019 03:19 AM    BUN 9 12/01/2019 03:19 AM    CREATININE 0.73 12/01/2019 03:19 AM    CREATININE 0.75 (L) 07/20/2010 11:00 AM    BUNCREATRAT 19 07/20/2010 11:00 AM    GLOMRATE >59 07/20/2010 11:00 AM      Lab Results   Component Value Date/Time    CHOLSTRLTOT 150 11/08/2019 04:30 AM    LDL 70 11/08/2019 04:30 AM    HDL 50 11/08/2019 04:30 AM    TRIGLYCERIDE 149 11/08/2019 04:30 AM       Lab Results   Component Value Date/Time    ALKPHOSPHAT 39 12/01/2019 03:19 AM    ASTSGOT 13 12/01/2019 03:19 AM    ALTSGPT 22 12/01/2019 03:19 AM    TBILIRUBIN 0.4 12/01/2019 03:19 AM        Imaging/Testing:  IR-US GUIDED PIV   Final Result    Ultrasound-guided PERIPHERAL IV INSERTION performed by    qualified nursing staff as above.            CT-HEAD W/O   Final Result      No evidence of acute intracranial process.      DX-LUMBAR SPINE-2 OR 3 VIEWS   Final Result      1.  No evidence of fracture.      2.  Surgical change.      3.  Mild degenerative change.      DX-THORACIC SPINE-2 VIEWS   Final Result      No evidence of fracture or dislocation.            Assessment and Plan:    29-year-old female suffered a fall from standing yesterday.  Most likely suffered a severe concussion yesterday.  She cannot  have an MRI due to implanted device in the LS-spine.  I am not sure what to offer her at this time in terms of pain management.  She is on multiple medications including Lyrica and Neurontin at this time.  She is also on Geodon, Depakote, Elavil, Abilify, BuSpar, and trazodone at home.  Given this combination of medications I really do not have anything else to offer her to add in terms of pain management.  I do not think the patient is having a current flare of her optic neuritis and she is currently on CellCept and prednisone still.  I think this time from neurologic standpoint recommend this rest and monitoring.  She does develop increased vision changes we could consider IV steroids at that time.        The evaluation of the patient, and recommended management, was discussed with the resident staff.     Reinier Hamilton MD  Board Certified Neurology, ABPN  (t) [phone]

## 2019-12-01 NOTE — H&P
Hospital Medicine History & Physical Note    Date of Service  11/30/2019    Primary Care Physician  Torres Brody M.D.    Consultants  none    Code Status  full    Chief Complaint  Head trauma     History of Presenting Illness  29 y.o. female who presented 11/30/2019 with past medical history of asthma, borderline personality disorder, diabetes, TONYA, PCOS, morbid obesity, schizophrenia who presents with ground-level fall and head trauma.  This patient slipped and fell in the hallway at home.  She had no preceding symptoms.  She states she fell backwards and struck the back of her head.  Now she is having moderate headache nausea multiple episodes of vomiting and dry heaving.  She is also had some back pain.  She is also seeing spots.  She has light sensitivity and sound sensitivity.  She will be admitted to the hospital with presumed concussion.  Otherwise no known alleviating or exacerbating factors to her symptoms.    Review of Systems  Review of Systems   Constitutional: Positive for malaise/fatigue. Negative for chills and fever.   HENT: Negative for congestion, hearing loss and tinnitus.    Eyes: Positive for photophobia. Negative for blurred vision, double vision and discharge.   Respiratory: Negative for cough, hemoptysis and shortness of breath.    Cardiovascular: Negative for chest pain, palpitations and leg swelling.   Gastrointestinal: Positive for nausea and vomiting. Negative for abdominal pain and heartburn.   Genitourinary: Negative for dysuria and flank pain.   Musculoskeletal: Negative for joint pain and myalgias.   Skin: Negative for rash.   Neurological: Positive for headaches. Negative for dizziness, sensory change, speech change, focal weakness and weakness.   Endo/Heme/Allergies: Negative for environmental allergies. Does not bruise/bleed easily.   Psychiatric/Behavioral: Negative for depression, hallucinations and substance abuse.       Past Medical History   has a past medical history of  Abdominal pain, Anginal syndrome, Apnea, sleep, Arrhythmia, Arthritis, ASTHMA, Atrial fibrillation (HCC), Back pain, Borderline personality disorder (HCC), Breath shortness, Bronchitis, Cardiac arrhythmia, Chickenpox, Chronic UTI (9/18/2010), Cough, Daytime sleepiness, Depression, Diabetes (HCC), Diarrhea, Disorder of thyroid, Fall, Fatigue, Frequent headaches, Gasping for breath, Gynecological disorder, Headache(784.0), Heart burn, History of falling, Hypertension, Indigestion, Migraine, 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[facility]), 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    Surgical History   has a past surgical history that includes neuro dest facet l/s w/ig sngl (4/21/2015); recovery (1/27/2016); [place] by laparoscopy (8/29/2010); lumbar fusion anterior (8/21/2012); other cardiac surgery (1/2016); tonsillectomy; bowel stimulator insertion (7/13/2016); gastroscopy with balloon dilatation (N/A, 1/4/2017); muscle biopsy (Right, 1/26/2017); other abdominal surgery; and laminotomy.     Family History  family history includes Genitourinary () Problems in her sister; Heart Disease in her maternal grandmother and mother; Hypertension in her maternal grandmother, maternal uncle, and mother; No Known Problems in her sister; Other in her mother and sister; Sleep Apnea in her mother.     Social History   reports that she has never smoked. She has never used smokeless tobacco. She reports current drug use. Frequency: 7.00 times per week. Drugs: Marijuana and Oral. She reports that she does not drink alcohol.    Allergies  Allergies   Allergen Reactions   • Cefdinir Shortness of Breath and Itching      "Tolerated 1/18/17  Tolerates ceftriaxone    • Depakote [Divalproex Sodium] Unspecified     Muscle spasms/muscle pain and weakness     • Doxycycline Anaphylaxis and Vomiting     RXN=unknown   • Amitriptyline Unspecified     Headaches     • Aripiprazole [Abilify] Unspecified     Headaches/muscle twitching     • Clindamycin Nausea     Even with food     • Ees [Erythromycin] Vomiting and Nausea   • Flagyl [Metronidazole Hcl] Unspecified     \"eye problems\"     • Flomax [Tamsulosin Hydrochloride] Swelling   • Levaquin Unspecified     Severe muscle cramps in legs  RXN=unknown   • Metformin Unspecified     Increased lactic acid      • Tape Rash     Tears skin off  coban with Tegaderm tape ok intermittently  RXN=ongoing   • Vancomycin Itching     Pt becomes flushed in face and chest.   RXN=7/10/16   • Wound Dressing Adhesive Hives     By pt report   • Cephalexin [Keflex] Rash     Pt states she gets a rash with this medication  Tolerates ceftriaxone   • Divalproex Sodium [Valproic Acid] Rash     .   • Erythromycin Rash     .   • Levofloxacin Unspecified     Leg muscle cramps   • Metronidazole Rash     .   • Tamsulosin Hcl        Medications  Prior to Admission Medications   Prescriptions Last Dose Informant Patient Reported? Taking?   Diclofenac Sodium (VOLTAREN) 1 % Gel  Patient Yes No   Sig: Apply 1 Application to skin as directed 4 times a day as needed (on wrists, back, ankles, and feet).   Dulaglutide (TRULICITY) 1.5 MG/0.5ML Solution Pen-injector  Patient Yes No   Sig: Inject 1.5 mg as instructed every Friday.   FLUoxetine (PROZAC) 40 MG capsule   No No   Sig: Take 1 Cap by mouth every day.   Ivabradine HCl (CORLANOR) 7.5 MG Tab  Patient Yes No   Sig: Take 7.5 mg by mouth 2 Times a Day.   Melatonin 5 MG Tab  Patient Yes No   Sig: Take 10 mg by mouth every evening.   albuterol 108 (90 Base) MCG/ACT Aero Soln inhalation aerosol  Patient Yes No   Sig: Inhale 2 Puffs by mouth every 6 hours as needed for Shortness of Breath. "   aspirin EC (ECOTRIN) 81 MG Tablet Delayed Response  Patient Yes No   Sig: Take 81 mg by mouth every day.   busPIRone (BUSPAR) 10 MG Tab tablet  Patient Yes No   Sig: Take 10 mg by mouth 2 times a day.   diphenhydrAMINE-APAP, sleep, (TYLENOL PM EXTRA STRENGTH)  MG Tab  Patient Yes No   Sig: Take 2 Tabs by mouth every evening.   etonogestrel (NEXPLANON) 68 MG Implant implant  Patient Yes No   Sig: Inject 1 Each as instructed Once.   folic acid (FOLVITE) 800 MCG tablet  Patient Yes No   Sig: Take 800 mg by mouth every day.   furosemide (LASIX) 80 MG Tab  Patient Yes No   Sig: Take 80 mg by mouth every day.   gabapentin (NEURONTIN) 300 MG Cap  Patient Yes No   Sig: Take 300 mg by mouth 4 times a day.   ipratropium-albuterol (DUONEB) 0.5-2.5 (3) MG/3ML nebulizer solution  Patient No No   Sig: 3 mL by Nebulization route every 6 hours as needed for Shortness of Breath.   levothyroxine (SYNTHROID) 75 MCG Tab  Patient Yes No   Sig: Take 75 mcg by mouth Every morning on an empty stomach.   methocarbamol (ROBAXIN) 750 MG Tab  Patient Yes No   Sig: Take 750 mg by mouth 3 times a day.   montelukast (SINGULAIR) 10 MG Tab  Patient Yes No   Sig: Take 10 mg by mouth every day.   mycophenolate (CELLCEPT) 500 MG tablet  Patient Yes No   Sig: Take 1,000 mg by mouth 2 times a day.   ondansetron (ZOFRAN ODT) 4 MG TABLET DISPERSIBLE  Patient Yes No   Sig: Take 4 mg by mouth every 6 hours as needed for Nausea.   potassium chloride SA (KDUR) 20 MEQ Tab CR  Patient Yes No   Sig: Take 20 mEq by mouth 2 times a day.   predniSONE (DELTASONE) 10 MG Tab  Patient Yes No   Sig: Take 10 mg by mouth every morning. Maintenance Medication.   pregabalin (LYRICA) 300 MG capsule  Patient Yes No   Sig: Take 300 mg by mouth 2 Times a Day.   ranitidine (ZANTAC) 300 MG tablet  Patient Yes No   Sig: Take 300 mg by mouth every morning.   sodium bicarbonate 325 MG Tab  Patient Yes No   Sig: Take 650 mg by mouth 2 Times a Day.   tiotropium (SPIRIVA) 18  MCG Cap  Patient No No   Sig: Inhale 1 Cap by mouth every day.   traZODone (DESYREL) 100 MG Tab  Patient Yes No   Sig: Take 100 mg by mouth every evening.   ziprasidone (GEODON) 80 MG Cap  Patient Yes No   Sig: Take 80 mg by mouth 2 Times a Day.      Facility-Administered Medications: None       Physical Exam  Temp:  [36.4 °C (97.5 °F)] 36.4 °C (97.5 °F)  Pulse:  [64-67] 64  Resp:  [12-16] 16  BP: (138-150)/(79-80) 150/80  SpO2:  [96 %-97 %] 96 %    Physical Exam  Vitals signs reviewed.   Constitutional:       General: She is in acute distress.      Appearance: Normal appearance.   HENT:      Head: Normocephalic and atraumatic.      Nose: No congestion.   Eyes:      Extraocular Movements: Extraocular movements intact.      Pupils: Pupils are equal, round, and reactive to light.   Neck:      Musculoskeletal: Normal range of motion and neck supple. No muscular tenderness.   Cardiovascular:      Rate and Rhythm: Normal rate and regular rhythm.      Pulses: Normal pulses.      Heart sounds: Normal heart sounds. No murmur.   Pulmonary:      Effort: Pulmonary effort is normal. No respiratory distress.      Breath sounds: Normal breath sounds. No stridor.   Abdominal:      General: Bowel sounds are normal. There is no distension.      Palpations: Abdomen is soft.      Tenderness: There is no tenderness.   Musculoskeletal:         General: No swelling or deformity.   Skin:     General: Skin is warm and dry.      Capillary Refill: Capillary refill takes less than 2 seconds.   Neurological:      Mental Status: She is alert.      Comments: Oriented x 2, slowed speech.   Psychiatric:         Mood and Affect: Mood normal.         Behavior: Behavior normal.         Thought Content: Thought content normal.         Judgment: Judgment normal.         Laboratory:          No results for input(s): ALTSGPT, ASTSGOT, ALKPHOSPHAT, TBILIRUBIN, DBILIRUBIN, GAMMAGT, AMYLASE, LIPASE, ALB, PREALBUMIN, GLUCOSE in the last 72 hours.      No  results for input(s): NTPROBNP in the last 72 hours.      No results for input(s): TROPONINT in the last 72 hours.    Urinalysis:    No results found     Imaging:  CT-HEAD W/O   Final Result      No evidence of acute intracranial process.      DX-LUMBAR SPINE-2 OR 3 VIEWS   Final Result      1.  No evidence of fracture.      2.  Surgical change.      3.  Mild degenerative change.      DX-THORACIC SPINE-2 VIEWS   Final Result      No evidence of fracture or dislocation.            Assessment/Plan:  I anticipate this patient is appropriate for observation status at this time.    * Closed head injury  Assessment & Plan  Closed head injury, with now changes in mentation   CT head unremarkable   Suspect this patients symptoms are due to concussion  Cont with IVF, anti emetics, pain control  Neuro checks   Reassess in am, may need neuro outpatient follow up if symptoms persist     Hypokalemia  Assessment & Plan  Replace and recheck labs    TONYA (obstructive sleep apnea)- (present on admission)  Assessment & Plan  cpap      Morbidly obese ([facility])- (present on admission)  Assessment & Plan  Encourage weight loss when mentation improves    Hypocalcemia  Assessment & Plan  Replace and recheck labs    Moderate intermittent asthma without complication- (present on admission)  Assessment & Plan  Not in acute exacerbation   resp care protocol ordered   Resume home montelukast     Acquired hypothyroidism- (present on admission)  Assessment & Plan  Resume home levothyroxine     Diabetes type 2, controlled ([facility])- (present on admission)  Assessment & Plan  SSI ordered   accu checks     Schizophrenia ([facility])- (present on admission)  Assessment & Plan  Resume home medications     GERD (gastroesophageal reflux disease)- (present on admission)  Assessment & Plan  Resume home pepcid     PCOS (polycystic ovarian syndrome)- (present on admission)  Assessment & Plan  Resume home medications     Borderline personality disorder in adult ([facility])-  (present on admission)  Assessment & Plan  Hx of resume home medications      VTE prophylaxis: heparin

## 2019-12-01 NOTE — ED NOTES
Patient resting in bed, sleeping, no signs of pain or distress, unlabored breathing noted, attached to cardiac monitor, bed in low position, call light within reach, family at bedside, repositioned for comfort.

## 2019-12-02 ENCOUNTER — PATIENT OUTREACH (OUTPATIENT)
Dept: HEALTH INFORMATION MANAGEMENT | Facility: OTHER | Age: 29
End: 2019-12-02

## 2019-12-02 VITALS
OXYGEN SATURATION: 99 % | DIASTOLIC BLOOD PRESSURE: 51 MMHG | SYSTOLIC BLOOD PRESSURE: 98 MMHG | TEMPERATURE: 98.3 F | HEIGHT: 65 IN | WEIGHT: 293 LBS | HEART RATE: 80 BPM | RESPIRATION RATE: 18 BRPM | BODY MASS INDEX: 48.82 KG/M2

## 2019-12-02 PROBLEM — E87.6 HYPOKALEMIA: Status: RESOLVED | Noted: 2019-09-29 | Resolved: 2019-12-02

## 2019-12-02 LAB
BACTERIA UR CULT: NORMAL
GLUCOSE BLD-MCNC: 102 MG/DL (ref 65–99)
GLUCOSE BLD-MCNC: 84 MG/DL (ref 65–99)
GLUCOSE BLD-MCNC: 92 MG/DL (ref 65–99)
SIGNIFICANT IND 70042: NORMAL
SITE SITE: NORMAL
SOURCE SOURCE: NORMAL

## 2019-12-02 PROCEDURE — A9270 NON-COVERED ITEM OR SERVICE: HCPCS | Performed by: HOSPITALIST

## 2019-12-02 PROCEDURE — 700102 HCHG RX REV CODE 250 W/ 637 OVERRIDE(OP): Performed by: HOSPITALIST

## 2019-12-02 PROCEDURE — 97165 OT EVAL LOW COMPLEX 30 MIN: CPT

## 2019-12-02 PROCEDURE — 82962 GLUCOSE BLOOD TEST: CPT

## 2019-12-02 PROCEDURE — 700111 HCHG RX REV CODE 636 W/ 250 OVERRIDE (IP): Performed by: HOSPITALIST

## 2019-12-02 PROCEDURE — 99239 HOSP IP/OBS DSCHRG MGMT >30: CPT | Performed by: HOSPITALIST

## 2019-12-02 PROCEDURE — 97161 PT EVAL LOW COMPLEX 20 MIN: CPT

## 2019-12-02 PROCEDURE — 96372 THER/PROPH/DIAG INJ SC/IM: CPT

## 2019-12-02 RX ORDER — PROMETHAZINE HYDROCHLORIDE 25 MG/1
25 SUPPOSITORY RECTAL EVERY 6 HOURS PRN
Status: DISCONTINUED | OUTPATIENT
Start: 2019-12-02 | End: 2019-12-02 | Stop reason: HOSPADM

## 2019-12-02 RX ORDER — PROMETHAZINE HYDROCHLORIDE 25 MG/1
25 SUPPOSITORY RECTAL EVERY 8 HOURS PRN
Qty: 10 SUPPOSITORY | Refills: 0 | Status: SHIPPED
Start: 2019-12-02 | End: 2019-12-13

## 2019-12-02 RX ORDER — IBUPROFEN 200 MG
200 TABLET ORAL EVERY 6 HOURS PRN
Status: DISCONTINUED | OUTPATIENT
Start: 2019-12-02 | End: 2019-12-02 | Stop reason: HOSPADM

## 2019-12-02 RX ADMIN — LEVOTHYROXINE SODIUM 75 MCG: 75 TABLET ORAL at 06:00

## 2019-12-02 RX ADMIN — MYCOPHENOLATE MOFETIL 1000 MG: 250 CAPSULE ORAL at 05:56

## 2019-12-02 RX ADMIN — GABAPENTIN 300 MG: 300 CAPSULE ORAL at 09:40

## 2019-12-02 RX ADMIN — MONTELUKAST 10 MG: 10 TABLET, FILM COATED ORAL at 05:51

## 2019-12-02 RX ADMIN — METHOCARBAMOL TABLETS 750 MG: 750 TABLET, COATED ORAL at 05:50

## 2019-12-02 RX ADMIN — GABAPENTIN 300 MG: 300 CAPSULE ORAL at 12:49

## 2019-12-02 RX ADMIN — PREGABALIN 300 MG: 100 CAPSULE ORAL at 05:50

## 2019-12-02 RX ADMIN — PROMETHAZINE HYDROCHLORIDE 25 MG: 25 SUPPOSITORY RECTAL at 09:40

## 2019-12-02 RX ADMIN — FLUOXETINE HYDROCHLORIDE 40 MG: 20 CAPSULE ORAL at 05:50

## 2019-12-02 RX ADMIN — BUSPIRONE HYDROCHLORIDE 10 MG: 10 TABLET ORAL at 05:50

## 2019-12-02 RX ADMIN — ACETAMINOPHEN 650 MG: 325 TABLET, FILM COATED ORAL at 01:24

## 2019-12-02 RX ADMIN — PREDNISONE 10 MG: 10 TABLET ORAL at 05:52

## 2019-12-02 RX ADMIN — ZIPRASIDONE HYDROCHLORIDE 80 MG: 80 CAPSULE ORAL at 05:56

## 2019-12-02 RX ADMIN — ASPIRIN 81 MG: 81 TABLET, COATED ORAL at 05:51

## 2019-12-02 RX ADMIN — SENNOSIDES AND DOCUSATE SODIUM 2 TABLET: 8.6; 5 TABLET ORAL at 05:56

## 2019-12-02 RX ADMIN — FAMOTIDINE 40 MG: 20 TABLET ORAL at 05:51

## 2019-12-02 RX ADMIN — IBUPROFEN 200 MG: 200 TABLET, FILM COATED ORAL at 10:55

## 2019-12-02 RX ADMIN — HEPARIN SODIUM 5000 UNITS: 5000 INJECTION, SOLUTION INTRAVENOUS; SUBCUTANEOUS at 05:50

## 2019-12-02 RX ADMIN — METHOCARBAMOL TABLETS 750 MG: 750 TABLET, COATED ORAL at 12:49

## 2019-12-02 ASSESSMENT — GAIT ASSESSMENTS
DISTANCE (FEET): 60
ASSISTIVE DEVICE: HAND HELD ASSIST
DEVIATION: BRADYKINETIC;DECREASED HEEL STRIKE;DECREASED TOE OFF
GAIT LEVEL OF ASSIST: MINIMAL ASSIST

## 2019-12-02 ASSESSMENT — COGNITIVE AND FUNCTIONAL STATUS - GENERAL
SUGGESTED CMS G CODE MODIFIER MOBILITY: CK
TURNING FROM BACK TO SIDE WHILE IN FLAT BAD: A LITTLE
MOVING TO AND FROM BED TO CHAIR: A LITTLE
MOVING FROM LYING ON BACK TO SITTING ON SIDE OF FLAT BED: A LITTLE
SUGGESTED CMS G CODE MODIFIER DAILY ACTIVITY: CH
STANDING UP FROM CHAIR USING ARMS: A LITTLE
CLIMB 3 TO 5 STEPS WITH RAILING: A LOT
MOBILITY SCORE: 17
DAILY ACTIVITIY SCORE: 24
WALKING IN HOSPITAL ROOM: A LITTLE

## 2019-12-02 NOTE — DISCHARGE INSTRUCTIONS
Post-Concussion Syndrome  Introduction  Post-concussion syndrome is the symptoms that can occur after a head injury. These symptoms can last from weeks to months.  Follow these instructions at home:  · Take medicines only as told by your doctor.  Do not take aspirin.  · Sleep with your head raised to help with headaches.  · Avoid activities that can cause another head injury.  ¨ Do not play contact sports like football, hockey, soccer, or basketball.  ¨ Do not do other risky activities like downhill skiing, martial arts, or horseback riding until your doctor says it is okay.  · Keep all follow-up visits as told by your doctor. This is important.  Contact a doctor if:  · You have a harder time:  ¨ Paying attention.  ¨ Focusing.  ¨ Remembering.  ¨ Learning new information.  ¨ Dealing with stress.  · You need more time to complete tasks.  · You are easily bothered (irritable).  · You have more symptoms.  Get help if you have any of these symptoms for more than two weeks after your injury:  · Long-lasting (chronic) headaches.  · Dizziness.  · Trouble balancing.  · Feeling sick to your stomach (nauseous).  · Trouble with your vision.  · Noise or light bothers you more.  · Depression.  · Mood swings.  · Feeling worried (anxious).  · Easily bothered.  · Memory problems.  · Trouble concentrating or paying attention.  · Sleep problems.  · Feeling tired all of the time.  Get help right away if:  · You feel confused.  · You feel very sleepy.  · You are hard to wake up.  · You feel sick to your stomach.  · You keep throwing up (vomiting).  · You feel like you are moving when you are not (vertigo).  · Your eyes move back and forth very quickly.  · You start shaking (convulsing) or pass out (faint).  · You have very bad headaches that do not get better with medicine.  · You cannot use your arms or legs like normal.  · One of the black centers of your eyes (pupils) is bigger than the other.  · You have clear or bloody fluid coming  from your nose or ears.  · Your problems get worse, not better.  This information is not intended to replace advice given to you by your health care provider. Make sure you discuss any questions you have with your health care provider.  Document Released: 01/25/2006 Document Revised: 05/25/2017 Document Reviewed: 03/25/2015  © 2017 [employer]  Discharge Instructions    Discharged to home by car with relative. Discharged via wheelchair, hospital escort: Yes.  Special equipment needed: Home Health    Be sure to schedule a follow-up appointment with your primary care doctor or any specialists as instructed.     Discharge Plan:   Diet Plan: Discussed  Activity Level: Discussed  Confirmed Follow up Appointment: Appointment Scheduled  Confirmed Symptoms Management: Discussed  Medication Reconciliation Updated: Yes    I understand that a diet low in cholesterol, fat, and sodium is recommended for good health. Unless I have been given specific instructions below for another diet, I accept this instruction as my diet prescription.   Other diet: regular    Special Instructions: None    · Is patient discharged on Warfarin / Coumadin?   No     Depression / Suicide Risk    As you are discharged from this [facility] facility, it is important to learn how to keep safe from harming yourself.    Recognize the warning signs:  · Abrupt changes in personality, positive or negative- including increase in energy   · Giving away possessions  · Change in eating patterns- significant weight changes-  positive or negative  · Change in sleeping patterns- unable to sleep or sleeping all the time   · Unwillingness or inability to communicate  · Depression  · Unusual sadness, discouragement and loneliness  · Talk of wanting to die  · Neglect of personal appearance   · Rebelliousness- reckless behavior  · Withdrawal from people/activities they love  · Confusion- inability to concentrate     If you or a loved one observes any of these behaviors  or has concerns about self-harm, here's what you can do:  · Talk about it- your feelings and reasons for harming yourself  · Remove any means that you might use to hurt yourself (examples: pills, rope, extension cords, firearm)  · Get professional help from the community (Mental Health, Substance Abuse, psychological counseling)  · Do not be alone:Call your Safe Contact- someone whom you trust who will be there for you.  · Call your local CRISIS HOTLINE 343-7834 or 188-396-7277  · Call your local Children's Mobile Crisis Response Team [place] (702) 571-9770 or www.[employer]  · Call the toll free National Suicide Prevention Hotlines   · National Suicide Prevention Lifeline 345-989-BMOZ (8505)  · National Hope Line Network 800-SUICIDE (719-6516)

## 2019-12-02 NOTE — DISCHARGE PLANNING
Received Choice form at [phone]  Agency/Facility Name: [facility]  Referral sent per Choice form @ [phone]

## 2019-12-02 NOTE — PROGRESS NOTES
Night shift:    Pt is A&Ox 4  Ambulated: No (Doing stand-pivot to commode, pt not supporting all of her own weight with her legs. Unclear reason.)    Voiding: Yes (bedside commode)  Last BM:  12/1  Pain: C/o back and headache pain, but also seems accepting that meds were dc'd

## 2019-12-02 NOTE — DISCHARGE SUMMARY
"Discharge Summary    CHIEF COMPLAINT ON ADMISSION  Chief Complaint   Patient presents with   • Fall       Reason for Admission  EMS G-27     Admission Date  11/30/2019    CODE STATUS  Full Code    HPI & HOSPITAL COURSE  Patient is a 29 y.o. female who presented 11/30/2019 with past medical history of asthma, borderline personality disorder, diabetes, TONYA, PCOS, morbid obesity, schizophrenia who presents with ground-level fall and head trauma.  This patient slipped and fell in the hallway at home.  She had no preceding symptoms.  She states she fell backwards and struck the back of her head. She was evaluated by both neurology and psychiatry and is likely suffering from post concussive syndrome.  Her headache has nearly resolved, it is rated as a 1/10 at time of discharge.  She has mild nausea and is tolerating a diabetic diet.  Her auditory and visual hallucinations have resolved and she is eager to discharge back to home.  She was cleared by PT and OT and will continue to be followed by home health.  She agrees to return to the ER if needed.    Therefore, she is discharged in fair and stable condition to home with organized home healthcare and close outpatient follow-up.    The patient met 2-midnight criteria for an inpatient stay at the time of discharge.    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      \"per patients grandmother\"    GERD (gastroesophageal reflux disease) (Chronic) POA: Yes    Schizophrenia (HCC) POA: Yes    Diabetes type 2, controlled (HCC) POA: Yes    Acquired hypothyroidism (Chronic) POA: Yes    Moderate intermittent asthma " without complication POA: Yes  Resolved Problems:    Hypokalemia POA: Unknown    PCOS (polycystic ovarian syndrome) POA: Yes      FOLLOW UP  Future Appointments   Date Time Provider Department Center   12/6/2019  3:40 PM AXEL Padilla [facility] None   1/7/2020  3:00 PM La Lutz M.D. [facility] [address]   1/16/2020  1:45 PM Ignacia Herrera M.D. [facility] None   2/25/2020  9:20 AM Torres Brody M.D. [address]   3/9/2020  1:20 PM Shahriar Moncada M.D. [facility] None     No follow-up provider specified.    MEDICATIONS ON DISCHARGE     Medication List      START taking these medications      Instructions   promethazine 25 MG Supp  Commonly known as:  PHENERGAN   Insert 1 Suppository in rectum every 8 hours as needed for Nausea/Vomiting.  Dose:  25 mg        CONTINUE taking these medications      Instructions   albuterol 108 (90 Base) MCG/ACT Aers inhalation aerosol   Inhale 2 Puffs by mouth every 6 hours as needed for Shortness of Breath.  Dose:  2 Puff     aspirin EC 81 MG Tbec  Commonly known as:  ECOTRIN   Take 81 mg by mouth every day.  Dose:  81 mg     busPIRone 10 MG Tabs tablet  Commonly known as:  BUSPAR   Take 10 mg by mouth 2 times a day.  Dose:  10 mg     CELLCEPT 500 MG tablet  Generic drug:  mycophenolate   Take 1,000 mg by mouth 2 times a day.  Dose:  1,000 mg     CORLANOR 7.5 MG Tabs  Generic drug:  Ivabradine HCl   Take 7.5 mg by mouth 2 Times a Day.  Dose:  7.5 mg     folic acid 800 MCG tablet  Commonly known as:  FOLVITE   Take 800 mg by mouth every day.  Dose:  800 mg     furosemide 80 MG Tabs  Commonly known as:  LASIX   Take 80 mg by mouth every day.  Dose:  80 mg     gabapentin 300 MG Caps  Commonly known as:  NEURONTIN   Take 300 mg by mouth 4 times a day.  Dose:  300 mg     ipratropium-albuterol 0.5-2.5 (3) MG/3ML nebulizer solution  Commonly known as:  DUONEB   3 mL by Nebulization route every 6 hours as needed for Shortness of Breath.  Dose:  3 mL     levothyroxine 75 MCG  Tabs  Commonly known as:  SYNTHROID   Take 75 mcg by mouth Every morning on an empty stomach.  Dose:  75 mcg     LYRICA 300 MG capsule  Generic drug:  pregabalin   Take 300 mg by mouth 2 Times a Day.  Dose:  300 mg     Melatonin 5 MG Tabs   Take 10 mg by mouth every evening.  Dose:  10 mg     methocarbamol 750 MG Tabs  Commonly known as:  ROBAXIN   Take 750 mg by mouth 3 times a day.  Dose:  750 mg     montelukast 10 MG Tabs  Commonly known as:  SINGULAIR   Take 10 mg by mouth every day.  Dose:  10 mg     NEXPLANON 68 MG Impl implant  Generic drug:  etonogestrel   Inject 1 Each as instructed Once.  Dose:  1 Each     ondansetron 4 MG Tbdp  Commonly known as:  ZOFRAN ODT   Take 4 mg by mouth every 6 hours as needed for Nausea.  Dose:  4 mg     potassium chloride SA 20 MEQ Tbcr  Commonly known as:  Kdur   Take 20 mEq by mouth 2 times a day.  Dose:  20 mEq     predniSONE 10 MG Tabs  Commonly known as:  DELTASONE   Take 10 mg by mouth every morning. Maintenance Medication.  Dose:  10 mg     PROZAC 40 MG capsule  Generic drug:  fluoxetine   Take 40 mg by mouth every day.  Dose:  40 mg     sodium bicarbonate 325 MG Tabs   Take 650 mg by mouth 2 Times a Day.  Dose:  650 mg     tiotropium 18 MCG Caps  Commonly known as:  SPIRIVA   Inhale 1 Cap by mouth every day.  Dose:  18 mcg     traZODone 100 MG Tabs  Commonly known as:  DESYREL   Take 100 mg by mouth every evening.  Dose:  100 mg     TRULICITY 1.5 MG/0.5ML Sopn  Generic drug:  Dulaglutide   Inject 1.5 mg as instructed every Friday.  Dose:  1.5 mg     TYLENOL PM EXTRA STRENGTH  MG Tabs  Generic drug:  diphenhydrAMINE-APAP (sleep)   Take 2 Tabs by mouth every evening.  Dose:  2 Tab     VOLTAREN 1 % Gel  Generic drug:  Diclofenac Sodium   Apply 1 Application to skin as directed 4 times a day as needed (on wrists, back, ankles, and feet).  Dose:  1 Application     ZANTAC 300 MG tablet  Generic drug:  ranitidine   Take 300 mg by mouth every morning.  Dose:  300 mg    "  ziprasidone 80 MG Caps  Commonly known as:  GEODON   Take 80 mg by mouth 2 Times a Day.  Dose:  80 mg            Allergies  Allergies   Allergen Reactions   • Cefdinir Shortness of Breath and Itching     Tolerated 1/18/17  Tolerates ceftriaxone    • Depakote [Divalproex Sodium] Unspecified     Muscle spasms/muscle pain and weakness     • Doxycycline Anaphylaxis and Vomiting     RXN=unknown   • Amitriptyline Unspecified     Headaches     • Aripiprazole [Abilify] Unspecified     Headaches/muscle twitching     • Clindamycin Nausea     Even with food     • Ees [Erythromycin] Vomiting and Nausea   • Flagyl [Metronidazole Hcl] Unspecified     \"eye problems\"     • Flomax [Tamsulosin Hydrochloride] Swelling   • Levaquin Unspecified     Severe muscle cramps in legs  RXN=unknown   • Metformin Unspecified     Increased lactic acid      • Tape Rash     Tears skin off  coban with Tegaderm tape ok intermittently  RXN=ongoing   • Vancomycin Itching     Pt becomes flushed in face and chest.   RXN=7/10/16   • Wound Dressing Adhesive Hives     By pt report   • Cephalexin [Keflex] Rash     Pt states she gets a rash with this medication  Tolerates ceftriaxone   • Divalproex Sodium [Valproic Acid] Rash     .   • Erythromycin Rash     .   • Levofloxacin Unspecified     Leg muscle cramps   • Metronidazole Rash     .   • Tamsulosin Hcl        DIET  Orders Placed This Encounter   Procedures   • Diet Order Diabetic     Standing Status:   Standing     Number of Occurrences:   1     Order Specific Question:   Diet:     Answer:   Diabetic [3]       ACTIVITY  As tolerated.    CONSULTATIONS  Neurology  Psych    PROCEDURES      LABORATORY  Lab Results   Component Value Date    SODIUM 140 12/01/2019    POTASSIUM 4.0 12/01/2019    CHLORIDE 110 12/01/2019    CO2 19 (L) 12/01/2019    GLUCOSE 101 (H) 12/01/2019    BUN 9 12/01/2019    CREATININE 0.73 12/01/2019    CREATININE 0.75 (L) 07/20/2010    GLOMRATE >59 07/20/2010        Lab Results   Component " Value Date    WBC 5.8 12/01/2019    WBC 6.1 07/20/2010    HEMOGLOBIN 12.3 12/01/2019    HEMATOCRIT 36.7 (L) 12/01/2019    PLATELETCT 166 12/01/2019        Total time of the discharge process exceeds 45 minutes.

## 2019-12-02 NOTE — FACE TO FACE
Face to Face Supporting Documentation - Home Health    The encounter with this patient was in whole or in part the primary reason for home health admission.    Date of encounter:   Patient:                    MRN:                       YOB: 2020  Kristin Balderrama  [id number]  1990     Home health to see patient for:  Skilled Nursing care for assessment, interventions & education    Skilled need for:  Recent Deterioration of Health Status post concussion    Skilled nursing interventions to include:  Comment: exam, vitals    Homebound status evidenced by:  Needs the assistance of another person in order to leave the home. Leaving home requires a considerable and taxing effort. There is a normal inability to leave the home.    Community Physician to provide follow up care: Torres Brody M.D.     Optional Interventions? No      I certify the face to face encounter for this home health care referral meets the CMS requirements and the encounter/clinical assessment with the patient was, in whole, or in part, for the medical condition(s) listed above, which is the primary reason for home health care. Based on my clinical findings: the service(s) are medically necessary, support the need for home health care, and the homebound criteria are met.  I certify that this patient has had a face to face encounter by myself.  Lucía Gil M.D. - NPI: [id number]

## 2019-12-03 ENCOUNTER — PATIENT OUTREACH (OUTPATIENT)
Dept: MEDICAL GROUP | Facility: MEDICAL CENTER | Age: 29
End: 2019-12-03

## 2019-12-03 NOTE — THERAPY
"Physical Therapy Evaluation completed.   Bed Mobility:  Supine to Sit: Supervised  Transfers: Sit to Stand: Supervised  Gait: Level Of Assist: Minimal Assist with hand held assist       Plan of Care: Patient with no further skilled PT needs in the acute care setting at this time  Discharge Recommendations: Equipment: No Equipment Needed. Post-acute therapy: Recommend home health transitional care for continued physical therapy services.     Ms. Balderrama is a 28 y/o female who was admitted secondary to ground level fall and head trauma. Pt demonstrated bed mobility, transfers, and gait with FWW at supervision. At EOB, LE strength and sensation were assessed. Sensation equal bilateral with neuropathy at baseline. R LE weakness 4-/5, L LE 5-/5. During ambulation, unilateral HHA was required to simulate FWW as patient was unable to  FWW secondary to casting of the L UE after a prior fall. Pt demonstrates decreased christina and limitation in distance though reports this is baseline ambulation distance. Pt report main form of mobility is WC. Pt reports she has had two recent falls, one being she fell from bed and another from slipping on the hardwood floors in the house. Pt was educated on use of assistive device for decreased fall risk. Pt reports she has ongoing home health services at home at a frequency of 2x/week. Due to patient performing functional mobility at baseline, no additional acute PT is needed, DC needs only. Anticipate pt with DC home with cont home health services. Recommend cont home health services for additional therapy pertaining to safety with mobility for reduced risk of recurrent falls.     See \"Rehab Therapy-Acute\" Patient Summary Report for complete documentation.     "

## 2019-12-03 NOTE — PROGRESS NOTES
12/3/19 10:40am:  CM post discharge outreach call first attempt.  VM left requesting return call to  at [phone].    12/4/19 10:00am:  CM post discharge call second attempt.  VM left requesting return call to  at [phone].

## 2019-12-04 ENCOUNTER — APPOINTMENT (OUTPATIENT)
Dept: RADIOLOGY | Facility: MEDICAL CENTER | Age: 29
End: 2019-12-04
Attending: EMERGENCY MEDICINE
Payer: MEDICARE

## 2019-12-04 ENCOUNTER — HOSPITAL ENCOUNTER (EMERGENCY)
Facility: MEDICAL CENTER | Age: 29
End: 2019-12-05
Attending: EMERGENCY MEDICINE
Payer: MEDICARE

## 2019-12-04 DIAGNOSIS — F07.81 POST CONCUSSION SYNDROME: ICD-10-CM

## 2019-12-04 LAB
ALBUMIN SERPL BCP-MCNC: 4.5 G/DL (ref 3.2–4.9)
ALBUMIN/GLOB SERPL: 2.3 G/DL
ALP SERPL-CCNC: 48 U/L (ref 30–99)
ALT SERPL-CCNC: 35 U/L (ref 2–50)
ANION GAP SERPL CALC-SCNC: 13 MMOL/L (ref 0–11.9)
AST SERPL-CCNC: 17 U/L (ref 12–45)
BASOPHILS # BLD AUTO: 0.6 % (ref 0–1.8)
BASOPHILS # BLD: 0.04 K/UL (ref 0–0.12)
BILIRUB SERPL-MCNC: 0.4 MG/DL (ref 0.1–1.5)
BUN SERPL-MCNC: 16 MG/DL (ref 8–22)
CALCIUM SERPL-MCNC: 9.5 MG/DL (ref 8.5–10.5)
CHLORIDE SERPL-SCNC: 106 MMOL/L (ref 96–112)
CO2 SERPL-SCNC: 20 MMOL/L (ref 20–33)
CREAT SERPL-MCNC: 0.89 MG/DL (ref 0.5–1.4)
EOSINOPHIL # BLD AUTO: 0.1 K/UL (ref 0–0.51)
EOSINOPHIL NFR BLD: 1.5 % (ref 0–6.9)
ERYTHROCYTE [DISTWIDTH] IN BLOOD BY AUTOMATED COUNT: 43.2 FL (ref 35.9–50)
GLOBULIN SER CALC-MCNC: 2 G/DL (ref 1.9–3.5)
GLUCOSE BLD-MCNC: 123 MG/DL (ref 65–99)
GLUCOSE SERPL-MCNC: 112 MG/DL (ref 65–99)
HCG SERPL QL: NEGATIVE
HCT VFR BLD AUTO: 40.5 % (ref 37–47)
HGB BLD-MCNC: 13.5 G/DL (ref 12–16)
IMM GRANULOCYTES # BLD AUTO: 0.02 K/UL (ref 0–0.11)
IMM GRANULOCYTES NFR BLD AUTO: 0.3 % (ref 0–0.9)
LACTATE BLD-SCNC: 2.2 MMOL/L (ref 0.5–2)
LYMPHOCYTES # BLD AUTO: 1.95 K/UL (ref 1–4.8)
LYMPHOCYTES NFR BLD: 30.1 % (ref 22–41)
MCH RBC QN AUTO: 30.8 PG (ref 27–33)
MCHC RBC AUTO-ENTMCNC: 33.3 G/DL (ref 33.6–35)
MCV RBC AUTO: 92.5 FL (ref 81.4–97.8)
MONOCYTES # BLD AUTO: 0.61 K/UL (ref 0–0.85)
MONOCYTES NFR BLD AUTO: 9.4 % (ref 0–13.4)
NEUTROPHILS # BLD AUTO: 3.75 K/UL (ref 2–7.15)
NEUTROPHILS NFR BLD: 58.1 % (ref 44–72)
NRBC # BLD AUTO: 0 K/UL
NRBC BLD-RTO: 0 /100 WBC
PLATELET # BLD AUTO: 191 K/UL (ref 164–446)
PMV BLD AUTO: 11.9 FL (ref 9–12.9)
POTASSIUM SERPL-SCNC: 3.7 MMOL/L (ref 3.6–5.5)
PROT SERPL-MCNC: 6.5 G/DL (ref 6–8.2)
RBC # BLD AUTO: 4.38 M/UL (ref 4.2–5.4)
SODIUM SERPL-SCNC: 139 MMOL/L (ref 135–145)
WBC # BLD AUTO: 6.5 K/UL (ref 4.8–10.8)

## 2019-12-04 PROCEDURE — 82962 GLUCOSE BLOOD TEST: CPT

## 2019-12-04 PROCEDURE — 96374 THER/PROPH/DIAG INJ IV PUSH: CPT

## 2019-12-04 PROCEDURE — 87077 CULTURE AEROBIC IDENTIFY: CPT

## 2019-12-04 PROCEDURE — 85025 COMPLETE CBC W/AUTO DIFF WBC: CPT

## 2019-12-04 PROCEDURE — 87186 SC STD MICRODIL/AGAR DIL: CPT

## 2019-12-04 PROCEDURE — 99285 EMERGENCY DEPT VISIT HI MDM: CPT

## 2019-12-04 PROCEDURE — 700111 HCHG RX REV CODE 636 W/ 250 OVERRIDE (IP): Performed by: EMERGENCY MEDICINE

## 2019-12-04 PROCEDURE — 81001 URINALYSIS AUTO W/SCOPE: CPT

## 2019-12-04 PROCEDURE — 83605 ASSAY OF LACTIC ACID: CPT

## 2019-12-04 PROCEDURE — 84703 CHORIONIC GONADOTROPIN ASSAY: CPT

## 2019-12-04 PROCEDURE — 36415 COLL VENOUS BLD VENIPUNCTURE: CPT

## 2019-12-04 PROCEDURE — 80053 COMPREHEN METABOLIC PANEL: CPT

## 2019-12-04 PROCEDURE — 70450 CT HEAD/BRAIN W/O DYE: CPT

## 2019-12-04 PROCEDURE — 96375 TX/PRO/DX INJ NEW DRUG ADDON: CPT

## 2019-12-04 PROCEDURE — 87086 URINE CULTURE/COLONY COUNT: CPT

## 2019-12-04 PROCEDURE — 700105 HCHG RX REV CODE 258: Performed by: EMERGENCY MEDICINE

## 2019-12-04 PROCEDURE — 700111 HCHG RX REV CODE 636 W/ 250 OVERRIDE (IP)

## 2019-12-04 RX ORDER — SODIUM CHLORIDE 9 MG/ML
1000 INJECTION, SOLUTION INTRAVENOUS ONCE
Status: COMPLETED | OUTPATIENT
Start: 2019-12-04 | End: 2019-12-05

## 2019-12-04 RX ORDER — KETOROLAC TROMETHAMINE 30 MG/ML
15 INJECTION, SOLUTION INTRAMUSCULAR; INTRAVENOUS ONCE
Status: COMPLETED | OUTPATIENT
Start: 2019-12-04 | End: 2019-12-04

## 2019-12-04 RX ORDER — ONDANSETRON 2 MG/ML
4 INJECTION INTRAMUSCULAR; INTRAVENOUS ONCE
Status: COMPLETED | OUTPATIENT
Start: 2019-12-04 | End: 2019-12-04

## 2019-12-04 RX ORDER — KETOROLAC TROMETHAMINE 30 MG/ML
INJECTION, SOLUTION INTRAMUSCULAR; INTRAVENOUS
Status: COMPLETED
Start: 2019-12-04 | End: 2019-12-04

## 2019-12-04 RX ADMIN — ONDANSETRON 4 MG: 2 INJECTION INTRAMUSCULAR; INTRAVENOUS at 22:32

## 2019-12-04 RX ADMIN — SODIUM CHLORIDE 1000 ML: 9 INJECTION, SOLUTION INTRAVENOUS at 22:28

## 2019-12-04 RX ADMIN — KETOROLAC TROMETHAMINE 15 MG: 30 INJECTION, SOLUTION INTRAMUSCULAR at 22:34

## 2019-12-04 RX ADMIN — KETOROLAC TROMETHAMINE 15 MG: 30 INJECTION, SOLUTION INTRAMUSCULAR; INTRAVENOUS at 22:34

## 2019-12-04 NOTE — LETTER
12/8/2019               Kristin Armstrong Balderrama  [address] [address]        Dear Kristin (MR#[id number])    This letter is sent in regards to your, recent visit to the [facility] Emergency Department on 12/4/2019.  During the visit, tests were performed to assist the physician in a medical diagnosis.  A review of those tests requires that we notify you of the following:    Your urine culture was POSITIVE for a bacteria called Klebsiella pneumoniae. The antibiotic prescribed for you (amoxicillin/clavulanate) should be active to treat this bacteria. IT IS IMPORTANT THAT YOU CONTINUE TAKING YOUR ANTIBIOTIC UNTIL IT IS FINISHED.      Please feel free to contact me at the number below if you have any questions or concerns. Thank you for your cooperation in the matter.    Sincerely,  ED Culture Follow-Up Staff  Mickie Beltre, PharmD    [facility], Emergency Department  [address] [place] [id number]  [phone](ED Culture Line)  [phone]

## 2019-12-05 VITALS
BODY MASS INDEX: 46.65 KG/M2 | HEIGHT: 65 IN | RESPIRATION RATE: 16 BRPM | SYSTOLIC BLOOD PRESSURE: 105 MMHG | WEIGHT: 280 LBS | OXYGEN SATURATION: 96 % | TEMPERATURE: 98.1 F | DIASTOLIC BLOOD PRESSURE: 66 MMHG | HEART RATE: 77 BPM

## 2019-12-05 LAB
APPEARANCE UR: ABNORMAL
BACTERIA #/AREA URNS HPF: NEGATIVE /HPF
BILIRUB UR QL STRIP.AUTO: NEGATIVE
COLOR UR: YELLOW
EPI CELLS #/AREA URNS HPF: ABNORMAL /HPF
GLUCOSE UR STRIP.AUTO-MCNC: NEGATIVE MG/DL
HYALINE CASTS #/AREA URNS LPF: ABNORMAL /LPF
KETONES UR STRIP.AUTO-MCNC: ABNORMAL MG/DL
LEUKOCYTE ESTERASE UR QL STRIP.AUTO: ABNORMAL
MICRO URNS: ABNORMAL
NITRITE UR QL STRIP.AUTO: NEGATIVE
PH UR STRIP.AUTO: 5 [PH] (ref 5–8)
PROT UR QL STRIP: NEGATIVE MG/DL
RBC # URNS HPF: ABNORMAL /HPF
RBC UR QL AUTO: ABNORMAL
SP GR UR STRIP.AUTO: 1.04
UROBILINOGEN UR STRIP.AUTO-MCNC: 0.2 MG/DL
WBC #/AREA URNS HPF: ABNORMAL /HPF

## 2019-12-05 PROCEDURE — 700102 HCHG RX REV CODE 250 W/ 637 OVERRIDE(OP): Performed by: EMERGENCY MEDICINE

## 2019-12-05 PROCEDURE — A9270 NON-COVERED ITEM OR SERVICE: HCPCS | Performed by: EMERGENCY MEDICINE

## 2019-12-05 RX ORDER — AMOXICILLIN AND CLAVULANATE POTASSIUM 875; 125 MG/1; MG/1
1 TABLET, FILM COATED ORAL ONCE
Status: COMPLETED | OUTPATIENT
Start: 2019-12-05 | End: 2019-12-05

## 2019-12-05 RX ORDER — AMOXICILLIN AND CLAVULANATE POTASSIUM 875; 125 MG/1; MG/1
1 TABLET, FILM COATED ORAL 2 TIMES DAILY
Qty: 14 TAB | Refills: 0 | Status: SHIPPED | OUTPATIENT
Start: 2019-12-05 | End: 2019-12-09

## 2019-12-05 RX ORDER — ONDANSETRON 4 MG/1
4 TABLET, ORALLY DISINTEGRATING ORAL EVERY 6 HOURS PRN
Qty: 10 TAB | Refills: 0 | Status: SHIPPED | OUTPATIENT
Start: 2019-12-05 | End: 2019-12-09

## 2019-12-05 RX ADMIN — AMOXICILLIN AND CLAVULANATE POTASSIUM 1 TABLET: 875; 125 TABLET, FILM COATED ORAL at 01:30

## 2019-12-05 NOTE — ED NOTES
Pt ambulatory to bathroom with assistance from RN. Pt ambulated back from bathroom without assistance, tolerated well.

## 2019-12-05 NOTE — DISCHARGE INSTRUCTIONS
You were seen in the ER for headache, nausea, vision changes after an admission for a concussion last week.  Your symptoms are most consistent with postconcussive syndrome which we discussed at length in the emergency department.  After concussions, we recommend something called brain rest.  This means that if you are performing an activity even if it is playing on your phone, reading, or watching TV and developed symptoms you should back off until you feel comfortable again.  Sometimes, this means resting quietly in a darkened room until your symptoms resolve.  I have given you a prescription for Zofran which is a nausea medication, please take it as directed.  You can take Tylenol and/or ibuprofen as directed on the bottle for headache control.  Please refrain from any activities where you can injure your head again until your symptoms have completely resolved.  It is very important that you follow-up with your primary care physician within 48 hours for a recheck.  You should return immediately to the ER with any new or worsening symptoms.

## 2019-12-05 NOTE — ED NOTES
Pt brought from [facility] to [place] [address] via wheelchair with family accompanying. Pt walked from wheelchair to bed without assistance, tolerated well. Pt placed in hospital gown and is resting in bed with even and unlabored breaths, in no apparent distress at this time. Will continue to monitor pt while awaiting orders.

## 2019-12-05 NOTE — ED NOTES
Discharge instructions given to pt. Prescriptions handed to pt at bedside. Pt educated, verbalizes understanding. All belongings accounted for. Pt wheeled out of ED with family at side. PIV removed and dressing applied.

## 2019-12-05 NOTE — ED PROVIDER NOTES
"ED Provider Note    CHIEF COMPLAINT  Chief Complaint   Patient presents with   • Slurred Speech     Patient brought to triage via wc, c/o slurred speech, dizziness and \"black dots\" in her eyes that started two hours ago. Patient was discharged monday after being treated for a concussion. Aox4, GCS 15, neuro intact.    • Dizziness       HPI  Kristin Balderrama is a 29 y.o. female with multiple underlying psychiatric and medical comorbidities who presents with a chief complaint of slurred speech. Patient has a history of frequent falls and reportedly suffered a ground level fall several days ago with head trauma. She was hospitalized and diagnosed with a concussion. She was discharged and was doing well until 2 hours prior to arrival when she developed dizziness, slurred speech, diffuse headache, nausea, and vomiting after awakening from a nap. She was prescribed phenergan suppositories during her last hospitalization but cannot afford them so she has not taken any medication for her symptoms. She denies any new head trauma. This is not the worst headache of her life and there was not an acute or thunderclap onset. She denies any focal weakness or numbness. No fevers, neck pain or stiffness, or chest pain. Denies alcohol or drug use.    REVIEW OF SYSTEMS  See HPI for further details. Headache. Dizziness. Vision disturbance. Nausea. Vomiting. All other systems are negative.     PAST MEDICAL HISTORY   has a past medical history of Abdominal pain, Anginal syndrome, Apnea, sleep, Arrhythmia, Arthritis, ASTHMA, Atrial fibrillation (HCC), Back pain, Borderline personality disorder (HCC), Breath shortness, Bronchitis, Cardiac arrhythmia, Chickenpox, Chronic UTI (9/18/2010), Cough, Daytime sleepiness, Depression, Diabetes (HCC), Diarrhea, Disorder of thyroid, Fall, Fatigue, Frequent headaches, Gasping for breath, Gynecological disorder, Headache(784.0), Heart burn, History of falling, Hypertension, Indigestion, Migraine, " 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HCC), 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HCC), Nausea, Obesity, Pain (08-15-12), Painful joint, PCOS (polycystic ovarian syndrome), Pneumonia (2012), Psychosis (HCC), Renal disorder, Ringing in ears, Scoliosis, Shortness of breath, Sinus tachycardia (10/31/2013), Sleep apnea, Snoring, Tonsillitis, Tuberculosis, Urinary bladder disorder, Urinary incontinence, Weakness, and Wears glasses.    SOCIAL HISTORY  Social History     Tobacco Use   • Smoking status: Never Smoker   • Smokeless tobacco: Never Used   Substance and Sexual Activity   • Alcohol use: No     Alcohol/week: 0.0 oz   • Drug use: Yes     Frequency: 7.0 times per week     Types: Marijuana, Oral     Comment: Medicinal edible's   • Sexual activity: Not Currently     Birth control/protection: Pill       SURGICAL HISTORY   has a past surgical history that includes neuro dest facet l/s w/ig sngl (4/21/2015); recovery (1/27/2016); [place] by laparoscopy (8/29/2010); lumbar fusion anterior (8/21/2012); other cardiac surgery (1/2016); tonsillectomy; bowel stimulator insertion (7/13/2016); gastroscopy with balloon dilatation (N/A, 1/4/2017); muscle biopsy (Right, 1/26/2017); other abdominal surgery; and laminotomy.    CURRENT MEDICATIONS  Home Medications     Reviewed by Mely Bruno R.N. (Registered Nurse) on 12/04/19 at 2113  Med List Status: <None>   Medication Last Dose Status   albuterol 108 (90 Base) MCG/ACT Aero Soln inhalation aerosol  Active   aspirin EC (ECOTRIN) 81 MG Tablet Delayed Response  Active   busPIRone (BUSPAR) 10 MG Tab tablet  Active   Diclofenac Sodium (VOLTAREN) 1 % Gel  Active   diphenhydrAMINE-APAP, sleep, (TYLENOL PM EXTRA STRENGTH)  MG Tab  Active   Dulaglutide (TRULICITY) 1.5 MG/0.5ML Solution Pen-injector  Active   etonogestrel (NEXPLANON) 68 MG Implant implant  Active   fluoxetine (PROZAC) 40 MG capsule  Active   folic acid (FOLVITE) 800 MCG tablet  Active   furosemide (LASIX) 80 MG Tab  Active  "  gabapentin (NEURONTIN) 300 MG Cap  Active   ipratropium-albuterol (DUONEB) 0.5-2.5 (3) MG/3ML nebulizer solution  Active   Ivabradine HCl (CORLANOR) 7.5 MG Tab  Active   levothyroxine (SYNTHROID) 75 MCG Tab  Active   Melatonin 5 MG Tab  Active   methocarbamol (ROBAXIN) 750 MG Tab  Active   montelukast (SINGULAIR) 10 MG Tab  Active   mycophenolate (CELLCEPT) 500 MG tablet  Active   ondansetron (ZOFRAN ODT) 4 MG TABLET DISPERSIBLE  Active   potassium chloride SA (KDUR) 20 MEQ Tab CR  Active   predniSONE (DELTASONE) 10 MG Tab  Active   pregabalin (LYRICA) 300 MG capsule  Active   promethazine (PHENERGAN) 25 MG Suppos  Active   ranitidine (ZANTAC) 300 MG tablet  Active   sodium bicarbonate 325 MG Tab  Active   tiotropium (SPIRIVA) 18 MCG Cap  Active   traZODone (DESYREL) 100 MG Tab  Active   ziprasidone (GEODON) 80 MG Cap  Active                ALLERGIES  Allergies   Allergen Reactions   • Cefdinir Shortness of Breath and Itching     Tolerated 1/18/17  Tolerates ceftriaxone    • Depakote [Divalproex Sodium] Unspecified     Muscle spasms/muscle pain and weakness     • Doxycycline Anaphylaxis and Vomiting     RXN=unknown   • Amitriptyline Unspecified     Headaches     • Aripiprazole [Abilify] Unspecified     Headaches/muscle twitching     • Clindamycin Nausea     Even with food     • Ees [Erythromycin] Vomiting and Nausea   • Flagyl [Metronidazole Hcl] Unspecified     \"eye problems\"     • Flomax [Tamsulosin Hydrochloride] Swelling   • Levaquin Unspecified     Severe muscle cramps in legs  RXN=unknown   • Metformin Unspecified     Increased lactic acid      • Tape Rash     Tears skin off  coban with Tegaderm tape ok intermittently  RXN=ongoing   • Vancomycin Itching     Pt becomes flushed in face and chest.   RXN=7/10/16   • Wound Dressing Adhesive Hives     By pt report   • Cephalexin [Keflex] Rash     Pt states she gets a rash with this medication  Tolerates ceftriaxone   • Divalproex Sodium [Valproic Acid] Rash     . " "  • Erythromycin Rash     .   • Levofloxacin Unspecified     Leg muscle cramps   • Metronidazole Rash     .   • Tamsulosin Hcl        PHYSICAL EXAM  VITAL SIGNS: /97   Pulse 81   Temp 36.7 °C (98.1 °F) (Oral)   Resp 18   Ht 1.651 m (5' 5\")   Wt (!) 127 kg (280 lb)   LMP 07/01/2019 (LMP Unknown)   SpO2 97%   BMI 46.59 kg/m²    Pulse ox interpretation: I interpret this pulse ox as normal.  Constitutional: Alert in no apparent distress.  HENT: No signs of trauma, Bilateral external ears normal, tubes in bilateral ears without drainage, no mastoid erythema or tenderness to percussion, no pain elicited with movement of the pinna bilaterally, Nose normal. Dry mucous membranes.  Eyes: Pupils are equal and reactive, Conjunctiva normal, Non-icteric.   Neck: Normal range of motion, No tenderness, Supple, No stridor.   Lymphatic: No lymphadenopathy noted.   Cardiovascular: Regular rate and rhythm, no murmurs.   Thorax & Lungs: Normal breath sounds, No respiratory distress, No wheezing, No chest tenderness.   Abdomen: Bowel sounds normal, Soft, No tenderness, No masses, No pulsatile masses. No peritoneal signs.  Skin: Warm, Dry, No erythema, No rash.   Back: Normal alignment.  Extremities: Intact distal pulses, No edema, No tenderness, No cyanosis.  Musculoskeletal: Good range of motion in all major joints. No tenderness to palpation or major deformities noted.   Neurologic: Alert, normal speech, normal vision, CN II-XII grossly intact, normal finger to nose, no pronator drift, no dysdiadochokinesia, normal strength in all extremities, sensation at baseline (h/o lower extremity neuropathy) in all extremities. Normal gait and stance.  Psychiatric: Affect normal, Judgment normal, Mood normal.       DIAGNOSTIC STUDIES / PROCEDURES    LABS  CBC  CMP  Lactic acid  HCG  UA    RADIOLOGY  CT head    COURSE & MEDICAL DECISION MAKING  Pertinent Labs & Imaging studies reviewed. (See chart for details)  This is a 29 year old " female with multiple underlying psychiatric and medical comorbidities and a recent diagnosis of post-concussion syndrome who is here with dizziness, nausea, vomiting, headache, vision disturbances. DDx includes, but is not limited to, hypoglycemia, migraine headache, infection, CVA, ICH, post-concussion syndrome, BPPV, labyrinthitis, AOM. Arrives AF with normal VS. Appears dehydrated with dry mucous membranes but non-toxic. She is alert and oriented. She has a normal and non-focal neurologic exam including normal vision, speech, and gait. Visual acuity was performed and was 20/70 in both eyes with OS: 20/70 and OD: 20/40. She does not have her corrective lenses with her.    Review of records reveals that the patient was admitted to this facility a week ago for head trauma after a ground level fall. She was evaluated by neurology and psychiatry and was felt to be suffering from post concussive syndrome.    Patient's CBC is normal. Metabolic panel reveals slightly elevated AG to 13, glucose of 112, and lactate of 2.2. I suspect the AG and LA is due to dehydration as the patient's mucous membranes are quite dry. A CT of the head was performed to rule out new ICH and SAH. It did not reveal acute abnormality. Fluid was noted (again) in the left mastoid air cells indicating history of OM or mastoiditis. Patient has tubes in bilateral ears. She has no mastoid tenderness, erythema, and there is no pain with manipulation of the pinna. She does complain of bilateral ear pain, face pain, and thick nasal discharge. She has maxillary sinus tenderness bilaterally and grandmother would like a prescription for abx for sinusitis. She has multiple allergies but has tolerated augmentin in the past so was given a dose in the ED.    Upon reevaluation, patient appears to be resting comfortably. She and grandmother are eager to go home. No lab or imaging abnormalities that require further workup. Patient has a normal neuro exam. She is  tolerating PO without difficulty; no episodes of emesis in the ED. She has ambulated twice without assistance. I suspect her symptoms are a continuation of her recently diagnosed post-concussive syndrome. She has a follow up appointment with her PCP on Friday. I will give her a prescription for zofran as she cannot afford the phenergan. Also given a prescription for augmentin. Discharged in good and stable condition.    The patient will not drink alcohol nor drive with prescribed medications. The patient will return for worsening symptoms and is stable at the time of discharge. The patient verbalizes understanding and will comply.    HYDRATION  HYDRATION: Based on the patient's presentation of Dehydration the patient was given IV fluids. IV Hydration was used because oral hydration was not adequate alone. Upon recheck following hydration, the patient was improved mucous membranes.    FINAL IMPRESSION  1. Post concussion syndrome    Electronically signed by: Dino Morales, 12/4/2019 9:46 PM

## 2019-12-05 NOTE — ED NOTES
Pt resting in bed talking to grandma at bedside. No distress is noted and this time and pt's breaths are even and unlabored. Pt and grandma updated regarding test results and pending plan of care, demonstrated understanding. Dr. Morales notified that all pt's test results are back. Will continue to monitor while awaiting further orders.

## 2019-12-05 NOTE — ED TRIAGE NOTES
"Chief Complaint   Patient presents with   • Slurred Speech     Patient brought to triage via wc, c/o slurred speech, dizziness and \"black dots\" in her eyes that started two hours ago. Patient was discharged monday after being treated for a concussion. Aox4, GCS 15, neuro intact.    • Dizziness     /97   Pulse 81   Temp 36.7 °C (98.1 °F) (Oral)   Resp 18   Ht 1.651 m (5' 5\")   Wt (!) 127 kg (280 lb)   SpO2 97%     FSBG 123, patient educated on ed triage process, instructed to notify staff of any new or worsening symptoms, [facility] apologized for [facility] times   "

## 2019-12-05 NOTE — ED NOTES
IV fluid started and meds given for pain and nausea, as ordered. Pt informed of need for urine sample and states that she can't provide one right now but she will let staff know when she is able to.

## 2019-12-06 ENCOUNTER — OFFICE VISIT (OUTPATIENT)
Dept: MEDICAL GROUP | Facility: MEDICAL CENTER | Age: 29
End: 2019-12-06
Payer: MEDICARE

## 2019-12-06 VITALS
OXYGEN SATURATION: 96 % | HEART RATE: 85 BPM | DIASTOLIC BLOOD PRESSURE: 70 MMHG | TEMPERATURE: 98.8 F | SYSTOLIC BLOOD PRESSURE: 112 MMHG

## 2019-12-06 DIAGNOSIS — S09.90XS CLOSED HEAD INJURY, SEQUELA: ICD-10-CM

## 2019-12-06 DIAGNOSIS — R30.0 DYSURIA: ICD-10-CM

## 2019-12-06 PROCEDURE — 99214 OFFICE O/P EST MOD 30 MIN: CPT | Performed by: INTERNAL MEDICINE

## 2019-12-06 NOTE — PROGRESS NOTES
Subjective:         POST DISCHARGE CALL:  Discharge Date:12/2/2019   Date of Outreach Call: 12/4/2019 10:24 AM  Now that you're home, how are you doing? Fair  Comment:Pt reports she feels about the same as when she  was hospitalized. Reports she is eating and taking fluids.  Reports she is using her wheelchair to assist with mobility.  Do you have questions about your medications? No    Did you fill your medications? No    Do you have a follow-up appointment scheduled?Yes    Discharging Department: Neurosurgery    Number of Attempts: 3  Current or previous attempts completed within two business days of discharge? Yes  Provided education regarding treatment plan, medication, self-management, ADLs? Yes  Has patient completed Advance Directive? If yes, advise them to bring to appointment. No  Care Manager phone number provided? Yes  Is there anything else I can help you with? No    Nurses outreach call note above reviewed.      Have reviewed discharge summary as well as all imaging and labs performed while in the hospital.    CC: Follow-up hospitalization head injury dysuria    HPI:   Kristin presents today with the following.    1. Closed head injury, sequela  Presents after ground-level fall hitting her head no obvious signs of bleeding.  She went back to the emergency room for headaches and dehydration.  She reports mild headaches improving in nature.  She was given an antibiotic Augmentin is now having diarrhea.  This is based off the CT findings she does not recall if she was having symptoms prior to her head injury.    2. Dysuria  Also had an abnormal urine she can never do a true clean catch she does have less than 50,000 colonies of bacteria.      Patient Active Problem List    Diagnosis Date Noted   • Breast wound 11/06/2017     Priority: High   • Weakness of both lower extremities 06/22/2016     Priority: High   • Bilateral heel pain secondary to calcaneal bone spurs 03/28/2016     Priority: High   • TONYA  (obstructive sleep apnea) 01/09/2018     Priority: Medium   • Morbidly obese ([facility]) 03/07/2016     Priority: Medium   • H/O prior ablation treatment 02/10/2016     Priority: Medium   • Immunocompromised ([facility]) 11/06/2019     Priority: Low   • History of atrial fibrillation and cardiomyopathy? 11/06/2019     Priority: Low   • Moderate intermittent asthma without complication 06/20/2019     Priority: Low   • Optic neuritis 05/27/2019     Priority: Low   • Acquired hypothyroidism 08/04/2017     Priority: Low   • Diabetes type 2, controlled ([facility]) 04/26/2017     Priority: Low   • Depression 10/28/2016     Priority: Low   • Schizophrenia ([facility]) 10/27/2016     Priority: Low   • GERD (gastroesophageal reflux disease) 03/07/2016     Priority: Low   • Peripheral neuropathy and chornic pain syndrome ([facility]) 03/06/2016     Priority: Low   • Fatty liver disease, nonalcoholic 01/19/2015     Priority: Low   • Anxiety 12/16/2014     Priority: Low   • Knee pain, right 02/13/2014     Priority: Low   • Neurogenic bladder 04/02/2011     Priority: Low   • Borderline personality disorder in adult ([facility]) 09/18/2010     Priority: Low   • Closed head injury 11/30/2019   • Hypocalcemia 11/30/2019   • Debility 09/30/2019   • Palpitations 10/01/2018   • Functional diarrhea 01/05/2018   • Bowel and bladder incontinence 10/27/2016   • Galactorrhea 07/22/2016   • Scoliosis 03/07/2016       Current Outpatient Medications   Medication Sig Dispense Refill   • ondansetron (ZOFRAN ODT) 4 MG TABLET DISPERSIBLE Take 1 Tab by mouth every 6 hours as needed. 10 Tab 0   • amoxicillin-clavulanate (AUGMENTIN) 875-125 MG Tab Take 1 Tab by mouth 2 times a day for 7 days. 14 Tab 0   • promethazine (PHENERGAN) 25 MG Suppos Insert 1 Suppository in rectum every 8 hours as needed for Nausea/Vomiting. 10 Suppository 0   • fluoxetine (PROZAC) 40 MG capsule Take 40 mg by mouth every day.     • aspirin EC (ECOTRIN) 81 MG Tablet Delayed Response Take 81 mg by mouth every  day.     • busPIRone (BUSPAR) 10 MG Tab tablet Take 10 mg by mouth 2 times a day.     • levothyroxine (SYNTHROID) 75 MCG Tab Take 75 mcg by mouth Every morning on an empty stomach.     • pregabalin (LYRICA) 300 MG capsule Take 300 mg by mouth 2 Times a Day.     • montelukast (SINGULAIR) 10 MG Tab Take 10 mg by mouth every day.     • ondansetron (ZOFRAN ODT) 4 MG TABLET DISPERSIBLE Take 4 mg by mouth every 6 hours as needed for Nausea.     • potassium chloride SA (KDUR) 20 MEQ Tab CR Take 20 mEq by mouth 2 times a day.     • ziprasidone (GEODON) 80 MG Cap Take 80 mg by mouth 2 Times a Day.     • traZODone (DESYREL) 100 MG Tab Take 100 mg by mouth every evening.     • predniSONE (DELTASONE) 10 MG Tab Take 10 mg by mouth every morning. Maintenance Medication.     • ranitidine (ZANTAC) 300 MG tablet Take 300 mg by mouth every morning.     • diphenhydrAMINE-APAP, sleep, (TYLENOL PM EXTRA STRENGTH)  MG Tab Take 2 Tabs by mouth every evening.     • Diclofenac Sodium (VOLTAREN) 1 % Gel Apply 1 Application to skin as directed 4 times a day as needed (on wrists, back, ankles, and feet).     • tiotropium (SPIRIVA) 18 MCG Cap Inhale 1 Cap by mouth every day. 90 Cap 3   • folic acid (FOLVITE) 800 MCG tablet Take 800 mg by mouth every day.     • Ivabradine HCl (CORLANOR) 7.5 MG Tab Take 7.5 mg by mouth 2 Times a Day.     • ipratropium-albuterol (DUONEB) 0.5-2.5 (3) MG/3ML nebulizer solution 3 mL by Nebulization route every 6 hours as needed for Shortness of Breath. 60 Bullet 1   • etonogestrel (NEXPLANON) 68 MG Implant implant Inject 1 Each as instructed Once.     • mycophenolate (CELLCEPT) 500 MG tablet Take 1,000 mg by mouth 2 times a day.     • gabapentin (NEURONTIN) 300 MG Cap Take 300 mg by mouth 4 times a day.     • Dulaglutide (TRULICITY) 1.5 MG/0.5ML Solution Pen-injector Inject 1.5 mg as instructed every Friday.     • sodium bicarbonate 325 MG Tab Take 650 mg by mouth 2 Times a Day.     • albuterol 108 (90 Base)  MCG/ACT Aero Soln inhalation aerosol Inhale 2 Puffs by mouth every 6 hours as needed for Shortness of Breath.     • Melatonin 5 MG Tab Take 10 mg by mouth every evening.     • furosemide (LASIX) 80 MG Tab Take 80 mg by mouth every day.     • methocarbamol (ROBAXIN) 750 MG Tab Take 750 mg by mouth 3 times a day.       No current facility-administered medications for this visit.          Allergies as of 12/06/2019 - Reviewed 12/06/2019   Allergen Reaction Noted   • Cefdinir Shortness of Breath and Itching 03/01/2016   • Depakote [divalproex sodium] Unspecified 06/14/2010   • Doxycycline Anaphylaxis and Vomiting 08/15/2012   • Amitriptyline Unspecified 10/31/2013   • Aripiprazole [abilify] Unspecified 01/17/2013   • Clindamycin Nausea 02/02/2011   • Ees [erythromycin] Vomiting and Nausea 08/28/2010   • Flagyl [metronidazole hcl] Unspecified 03/31/2011   • Flomax [tamsulosin hydrochloride] Swelling 09/24/2009   • Levaquin Unspecified 10/31/2013   • Metformin Unspecified 07/23/2013   • Tape Rash 08/15/2012   • Vancomycin Itching 07/10/2016   • Wound dressing adhesive Hives 01/12/2018   • Cephalexin [keflex] Rash 01/01/2017   • Divalproex sodium [valproic acid] Rash 03/30/2017   • Erythromycin Rash 03/30/2017   • Levofloxacin Unspecified 10/27/2016   • Metronidazole Rash 03/30/2017   • Tamsulosin hcl  09/19/2010        ROS: Denies Chest pain, SOB, LE edema.    /70 (BP Location: Left arm, Patient Position: Sitting)   Pulse 85   Temp 37.1 °C (98.8 °F)   LMP 07/01/2019 (LMP Unknown)   SpO2 96%     Physical Exam:  Gen:         Alert and oriented, No apparent distress.  Neck:        No Lymphadenopathy or Bruits.  Lungs:     Clear to auscultation bilaterally  CV:          Regular rate and rhythm. No murmurs, rubs or gallops.               Ext:          No clubbing, cyanosis, edema.      Assessment and Plan.   29 y.o. female with the following issues.    1. Closed head injury, sequela  Discussion about antibiotic  whether it is needed or not if she does not recall significant symptoms prior to her head injury stop antibiotic.  Recommend good hydration.    2. Dysuria  Again urinalysis growing what looks like a likely E. coli and low colony numbers.  Up to her whether she continues antibiotic or not.          - Hospitalization and results reviewed with patient.   - Medications reviewed including instructions regarding high risk medications, dosing and side effects.

## 2019-12-07 LAB
BACTERIA UR CULT: ABNORMAL
SIGNIFICANT IND 70042: ABNORMAL
SITE SITE: ABNORMAL
SOURCE SOURCE: ABNORMAL

## 2019-12-08 NOTE — PROGRESS NOTES
ED Positive Culture Follow-up/Notification Note:    Date: 12/8/19     Patient seen in the ED on 12/4/2019 for slurred speech, dizziness, HA, nausea and vomiting, black dots in eyes after recent admission for concussion. Patient complained of face pain, and thick nasal discharge with bilateral maxillary sinus tenderness.   1. Post concussion syndrome       Discharge Medication List as of 12/5/2019  1:25 AM      START taking these medications    Details   !! ondansetron (ZOFRAN ODT) 4 MG TABLET DISPERSIBLE Take 1 Tab by mouth every 6 hours as needed., Disp-10 Tab, R-0, Print Rx Paper      amoxicillin-clavulanate (AUGMENTIN) 875-125 MG Tab Take 1 Tab by mouth 2 times a day for 7 days., Disp-14 Tab, R-0, Print Rx Paper       !! - Potential duplicate medications found. Please discuss with provider.          Allergies: Cefdinir; Depakote [divalproex sodium]; Doxycycline; Amitriptyline; Aripiprazole [abilify]; Clindamycin; Ees [erythromycin]; Flagyl [metronidazole hcl]; Flomax [tamsulosin hydrochloride]; Levaquin; Metformin; Tape; Vancomycin; Wound dressing adhesive; Cephalexin [keflex]; Divalproex sodium [valproic acid]; Erythromycin; Levofloxacin; Metronidazole; and Tamsulosin hcl     Vitals:    12/04/19 2331 12/05/19 0001 12/05/19 0031 12/05/19 0101   BP: 109/58 106/55 110/55 105/66   Pulse: 81 79 71 77   Resp:  16     Temp:       TempSrc:       SpO2: 96% 96% 98% 96%   Weight:       Height:           Final cultures:   Results     Procedure Component Value Units Date/Time    URINE CULTURE(NEW) [[id number]]  (Abnormal)  (Susceptibility) Collected:  12/04/19 5466    Order Status:  Completed Specimen:  Urine Updated:  12/07/19 1632     Significant Indicator POS     Source UR     Site -     Culture Result Mixed skin letty 10-50,000 cfu/mL      Klebsiella pneumoniae  10-50,000 cfu/mL        Diphtheroids  ,000 cfu/mL      Susceptibility     Klebsiella pneumoniae (1)     Antibiotic Interpretation Microscan Method Status     Ampicillin Resistant >16 mcg/mL TYLER Final    Ceftriaxone Sensitive <=1 mcg/mL TYLER Final    Ceftazidime Sensitive <=1 mcg/mL TYLER Final    Cefotaxime Sensitive <=2 mcg/mL TYLER Final    Cefazolin Sensitive <=2 mcg/mL TYLER Final    Ciprofloxacin Sensitive <=1 mcg/mL TYLER Final    Ampicillin/sulbactam Sensitive <=8/4 mcg/mL TYLER Final    Cefepime Sensitive <=2 mcg/mL TYLER Final    Tobramycin Sensitive <=4 mcg/mL TYLER Final    Cefotetan Sensitive <=16 mcg/mL TYLER Final    Nitrofurantoin Intermediate 64 mcg/mL TYELR Final    Gentamicin Sensitive <=4 mcg/mL TYLER Final    Levofloxacin Sensitive <=2 mcg/mL TYLER Final    Pip/Tazobactam Sensitive <=16 mcg/mL TYLER Final    Trimeth/Sulfa Sensitive <=2/38 mcg/mL TYLER Final                   URINALYSIS,CULTURE IF INDICATED [[id number]]  (Abnormal) Collected:  12/04/19 3654    Order Status:  Completed Specimen:  Urine, Clean Catch Updated:  12/05/19 0002     Color Yellow     Character Cloudy     Specific Gravity 1.045     Ph 5.0     Glucose Negative mg/dL      Ketones Trace mg/dL      Protein Negative mg/dL      Bilirubin Negative     Urobilinogen, Urine 0.2     Nitrite Negative     Leukocyte Esterase Small     Occult Blood Moderate     Micro Urine Req Microscopic          Plan:   Although patient did not complain of urinary symptoms, isolated organism is sensitive to prescribed sinusitis treatment. Will send letter informing patient of results and encourage compliance.     Mickie Beltre

## 2019-12-09 ENCOUNTER — APPOINTMENT (OUTPATIENT)
Dept: RADIOLOGY | Facility: MEDICAL CENTER | Age: 29
End: 2019-12-09
Attending: EMERGENCY MEDICINE
Payer: MEDICARE

## 2019-12-09 ENCOUNTER — HOSPITAL ENCOUNTER (EMERGENCY)
Facility: MEDICAL CENTER | Age: 29
End: 2019-12-09
Attending: EMERGENCY MEDICINE
Payer: MEDICARE

## 2019-12-09 VITALS
HEART RATE: 87 BPM | SYSTOLIC BLOOD PRESSURE: 150 MMHG | DIASTOLIC BLOOD PRESSURE: 79 MMHG | TEMPERATURE: 98.4 F | BODY MASS INDEX: 46.65 KG/M2 | HEIGHT: 65 IN | WEIGHT: 280 LBS | RESPIRATION RATE: 18 BRPM | OXYGEN SATURATION: 95 %

## 2019-12-09 DIAGNOSIS — J06.9 VIRAL UPPER RESPIRATORY TRACT INFECTION: ICD-10-CM

## 2019-12-09 DIAGNOSIS — J45.41 MODERATE PERSISTENT ASTHMA WITH ACUTE EXACERBATION: ICD-10-CM

## 2019-12-09 DIAGNOSIS — N39.0 ACUTE UTI: ICD-10-CM

## 2019-12-09 LAB
ANION GAP SERPL CALC-SCNC: 16 MMOL/L (ref 0–11.9)
APPEARANCE UR: CLEAR
BACTERIA #/AREA URNS HPF: ABNORMAL /HPF
BASOPHILS # BLD AUTO: 0.4 % (ref 0–1.8)
BASOPHILS # BLD: 0.02 K/UL (ref 0–0.12)
BILIRUB UR QL STRIP.AUTO: NEGATIVE
BUN SERPL-MCNC: 10 MG/DL (ref 8–22)
CALCIUM SERPL-MCNC: 8.8 MG/DL (ref 8.5–10.5)
CHLORIDE SERPL-SCNC: 110 MMOL/L (ref 96–112)
CO2 SERPL-SCNC: 16 MMOL/L (ref 20–33)
COLOR UR: YELLOW
COMMENT 1642: NORMAL
CREAT SERPL-MCNC: 0.78 MG/DL (ref 0.5–1.4)
EOSINOPHIL # BLD AUTO: 0.08 K/UL (ref 0–0.51)
EOSINOPHIL NFR BLD: 1.5 % (ref 0–6.9)
EPI CELLS #/AREA URNS HPF: ABNORMAL /HPF
ERYTHROCYTE [DISTWIDTH] IN BLOOD BY AUTOMATED COUNT: 43.6 FL (ref 35.9–50)
FLUAV RNA SPEC QL NAA+PROBE: NEGATIVE
FLUBV RNA SPEC QL NAA+PROBE: NEGATIVE
GLUCOSE SERPL-MCNC: 172 MG/DL (ref 65–99)
GLUCOSE UR STRIP.AUTO-MCNC: 100 MG/DL
HCG SERPL QL: NEGATIVE
HCT VFR BLD AUTO: 40 % (ref 37–47)
HGB BLD-MCNC: 13.4 G/DL (ref 12–16)
HYALINE CASTS #/AREA URNS LPF: ABNORMAL /LPF
IMM GRANULOCYTES # BLD AUTO: 0.02 K/UL (ref 0–0.11)
IMM GRANULOCYTES NFR BLD AUTO: 0.4 % (ref 0–0.9)
KETONES UR STRIP.AUTO-MCNC: 15 MG/DL
LEUKOCYTE ESTERASE UR QL STRIP.AUTO: ABNORMAL
LYMPHOCYTES # BLD AUTO: 1.46 K/UL (ref 1–4.8)
LYMPHOCYTES NFR BLD: 27.9 % (ref 22–41)
MCH RBC QN AUTO: 31.2 PG (ref 27–33)
MCHC RBC AUTO-ENTMCNC: 33.5 G/DL (ref 33.6–35)
MCV RBC AUTO: 93 FL (ref 81.4–97.8)
MICRO URNS: ABNORMAL
MONOCYTES # BLD AUTO: 0.29 K/UL (ref 0–0.85)
MONOCYTES NFR BLD AUTO: 5.5 % (ref 0–13.4)
MORPHOLOGY BLD-IMP: NORMAL
NEUTROPHILS # BLD AUTO: 3.37 K/UL (ref 2–7.15)
NEUTROPHILS NFR BLD: 64.3 % (ref 44–72)
NITRITE UR QL STRIP.AUTO: NEGATIVE
NRBC # BLD AUTO: 0 K/UL
NRBC BLD-RTO: 0 /100 WBC
PH UR STRIP.AUTO: 6.5 [PH] (ref 5–8)
PLATELET # BLD AUTO: 138 K/UL (ref 164–446)
PMV BLD AUTO: 12.3 FL (ref 9–12.9)
POTASSIUM SERPL-SCNC: 3.6 MMOL/L (ref 3.6–5.5)
PROT UR QL STRIP: NEGATIVE MG/DL
RBC # BLD AUTO: 4.3 M/UL (ref 4.2–5.4)
RBC # URNS HPF: ABNORMAL /HPF
RBC UR QL AUTO: ABNORMAL
SODIUM SERPL-SCNC: 142 MMOL/L (ref 135–145)
SP GR UR STRIP.AUTO: 1.02
UROBILINOGEN UR STRIP.AUTO-MCNC: 0.2 MG/DL
WBC # BLD AUTO: 5.2 K/UL (ref 4.8–10.8)
WBC #/AREA URNS HPF: ABNORMAL /HPF

## 2019-12-09 PROCEDURE — 85025 COMPLETE CBC W/AUTO DIFF WBC: CPT

## 2019-12-09 PROCEDURE — 700101 HCHG RX REV CODE 250: Performed by: EMERGENCY MEDICINE

## 2019-12-09 PROCEDURE — 93005 ELECTROCARDIOGRAM TRACING: CPT | Performed by: EMERGENCY MEDICINE

## 2019-12-09 PROCEDURE — 81001 URINALYSIS AUTO W/SCOPE: CPT

## 2019-12-09 PROCEDURE — 94640 AIRWAY INHALATION TREATMENT: CPT

## 2019-12-09 PROCEDURE — 99285 EMERGENCY DEPT VISIT HI MDM: CPT

## 2019-12-09 PROCEDURE — 700111 HCHG RX REV CODE 636 W/ 250 OVERRIDE (IP): Performed by: EMERGENCY MEDICINE

## 2019-12-09 PROCEDURE — 84703 CHORIONIC GONADOTROPIN ASSAY: CPT

## 2019-12-09 PROCEDURE — 94760 N-INVAS EAR/PLS OXIMETRY 1: CPT

## 2019-12-09 PROCEDURE — 96374 THER/PROPH/DIAG INJ IV PUSH: CPT

## 2019-12-09 PROCEDURE — 304561 HCHG STAT O2

## 2019-12-09 PROCEDURE — 80048 BASIC METABOLIC PNL TOTAL CA: CPT

## 2019-12-09 PROCEDURE — 87502 INFLUENZA DNA AMP PROBE: CPT

## 2019-12-09 PROCEDURE — 71045 X-RAY EXAM CHEST 1 VIEW: CPT

## 2019-12-09 RX ORDER — METHYLPREDNISOLONE SODIUM SUCCINATE 125 MG/2ML
125 INJECTION, POWDER, LYOPHILIZED, FOR SOLUTION INTRAMUSCULAR; INTRAVENOUS ONCE
Status: COMPLETED | OUTPATIENT
Start: 2019-12-09 | End: 2019-12-09

## 2019-12-09 RX ORDER — BUDESONIDE AND FORMOTEROL FUMARATE DIHYDRATE 160; 4.5 UG/1; UG/1
2 AEROSOL RESPIRATORY (INHALATION) 2 TIMES DAILY
COMMUNITY
End: 2020-07-10

## 2019-12-09 RX ORDER — LORAZEPAM 2 MG/ML
0.5 INJECTION INTRAMUSCULAR ONCE
Status: COMPLETED | OUTPATIENT
Start: 2019-12-09 | End: 2019-12-09

## 2019-12-09 RX ORDER — PREDNISONE 20 MG/1
60 TABLET ORAL DAILY
Qty: 15 TAB | Refills: 0 | Status: SHIPPED | OUTPATIENT
Start: 2019-12-09 | End: 2019-12-13

## 2019-12-09 RX ORDER — SULFAMETHOXAZOLE AND TRIMETHOPRIM 800; 160 MG/1; MG/1
1 TABLET ORAL EVERY 12 HOURS
Qty: 6 TAB | Refills: 0 | Status: SHIPPED | OUTPATIENT
Start: 2019-12-09 | End: 2019-12-12

## 2019-12-09 RX ORDER — IPRATROPIUM BROMIDE AND ALBUTEROL SULFATE 2.5; .5 MG/3ML; MG/3ML
6 SOLUTION RESPIRATORY (INHALATION)
Status: COMPLETED | OUTPATIENT
Start: 2019-12-09 | End: 2019-12-09

## 2019-12-09 RX ADMIN — ALBUTEROL SULFATE 5 MG: 2.5 SOLUTION RESPIRATORY (INHALATION) at 19:18

## 2019-12-09 RX ADMIN — METHYLPREDNISOLONE SODIUM SUCCINATE 125 MG: 125 INJECTION, POWDER, FOR SOLUTION INTRAMUSCULAR; INTRAVENOUS at 18:51

## 2019-12-09 RX ADMIN — IPRATROPIUM BROMIDE AND ALBUTEROL SULFATE 6 ML: .5; 3 SOLUTION RESPIRATORY (INHALATION) at 17:16

## 2019-12-09 RX ADMIN — LORAZEPAM 0.5 MG: 2 INJECTION INTRAMUSCULAR; INTRAVENOUS at 18:52

## 2019-12-09 ASSESSMENT — LIFESTYLE VARIABLES: EVER_SMOKED: NEVER

## 2019-12-09 ASSESSMENT — COPD QUESTIONNAIRES
DO YOU EVER COUGH UP ANY MUCUS OR PHLEGM?: YES, EVERY DAY
COPD SCREENING SCORE: 6
DURING THE PAST 4 WEEKS HOW MUCH DID YOU FEEL SHORT OF BREATH: MOST  OR ALL OF THE TIME
HAVE YOU SMOKED AT LEAST 100 CIGARETTES IN YOUR ENTIRE LIFE: NO/DON'T KNOW

## 2019-12-10 NOTE — ED PROVIDER NOTES
ED Provider Note    Scribed for Glynn Hinds M.D. by Azam Zuniga. 12/9/2019, 5:05 PM.    Primary care provider: Torres Brody M.D.  Means of arrival: EMS  History obtained from: patient  History limited by: none    CHIEF COMPLAINT  Chief Complaint   Patient presents with   • Shortness of Breath       HPI  Kristin Balderrama is a 29 y.o. female who presents to the Emergency Department via EMS for shortness of breath onset at 2:30 today. She is on 2 to 4L of oxygen. She has associated cough, rhinorrhea, dysuria, nausea, right leg pain, and vomiting. She states that she currently has a concussion and UTI. She denies any fever. She has asthma and denies any history of blood clots in legs or lungs.      REVIEW OF SYSTEMS  Pertinent positives include shortness of breath, cough, rhinorrhea, dysuria, nausea, right leg pain, and vomiting . Pertinent negatives include fever. All other systems negative.    PAST MEDICAL HISTORY   has a past medical history of Abdominal pain, Anginal syndrome, Apnea, sleep, Arrhythmia, Arthritis, ASTHMA, Atrial fibrillation (HCC), Back pain, Borderline personality disorder ([facility]), Breath shortness, Bronchitis, Cardiac arrhythmia, Chickenpox, Chronic UTI (9/18/2010), Cough, Daytime sleepiness, Depression, Diabetes (HCC), Diarrhea, Disorder of thyroid, Fall, Fatigue, Frequent headaches, Gasping for breath, Gynecological disorder, Headache(784.0), Heart burn, History of falling, Hypertension, Indigestion, Migraine, 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HCC), 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[facility]), Nausea, Obesity, Pain (08-15-12), Painful joint, PCOS (polycystic ovarian syndrome), Pneumonia (2012), Psychosis ([facility]), Renal disorder, Ringing in ears, Scoliosis, Shortness of breath, Sinus tachycardia (10/31/2013), Sleep apnea, Snoring, Tonsillitis, Tuberculosis, Urinary bladder disorder, Urinary incontinence, Weakness, and Wears glasses.    SURGICAL HISTORY   has a past surgical history that includes neuro  dest facet l/s w/ig sngl (4/21/2015); recovery (1/27/2016); [place] by laparoscopy (8/29/2010); lumbar fusion anterior (8/21/2012); other cardiac surgery (1/2016); tonsillectomy; bowel stimulator insertion (7/13/2016); gastroscopy with balloon dilatation (N/A, 1/4/2017); muscle biopsy (Right, 1/26/2017); other abdominal surgery; and laminotomy.    SOCIAL HISTORY  Social History     Tobacco Use   • Smoking status: Never Smoker   • Smokeless tobacco: Never Used   Substance Use Topics   • Alcohol use: No     Alcohol/week: 0.0 oz   • Drug use: Yes     Frequency: 7.0 times per week     Types: Marijuana, Oral     Comment: Medicinal edible's      Social History     Substance and Sexual Activity   Drug Use Yes   • Frequency: 7.0 times per week   • Types: Marijuana, Oral    Comment: Medicinal edible's       FAMILY HISTORY  Family History   Problem Relation Age of Onset   • Hypertension Mother    • Sleep Apnea Mother    • Heart Disease Mother    • Other Mother         hypothryod   • Hypertension Maternal Uncle    • Heart Disease Maternal Grandmother    • Hypertension Maternal Grandmother    • No Known Problems Sister    • Other Sister         Narcolepsy;fibromyalsia;bone;nerve   • Genitourinary () Problems Sister         endometriosis       CURRENT MEDICATIONS  Home Medications     Reviewed by Tawny Polo, Pharmacy Intern (Pharmacy Intern) on 12/09/19 at 1703  Med List Status: Complete   Medication Last Dose Status   albuterol 108 (90 Base) MCG/ACT Aero Soln inhalation aerosol 12/9/2019 Active   aspirin EC (ECOTRIN) 81 MG Tablet Delayed Response 12/9/2019 Active   budesonide-formoterol (SYMBICORT) 160-4.5 MCG/ACT Aerosol 12/9/2019 Active   busPIRone (BUSPAR) 10 MG Tab tablet 12/9/2019 Active   Diclofenac Sodium (VOLTAREN) 1 % Gel 12/9/2019 Active   diphenhydrAMINE-APAP, sleep, (TYLENOL PM EXTRA STRENGTH)  MG Tab 12/8/2019 Active   Dulaglutide (TRULICITY) 1.5 MG/0.5ML Solution Pen-injector 12/6/2019 Active  "  etonogestrel (NEXPLANON) 68 MG Implant implant  Active   fluoxetine (PROZAC) 40 MG capsule 12/9/2019 Active   folic acid (FOLVITE) 800 MCG tablet 12/9/2019 Active   furosemide (LASIX) 80 MG Tab 12/9/2019 Active   gabapentin (NEURONTIN) 300 MG Cap 12/9/2019 Active   ipratropium-albuterol (DUONEB) 0.5-2.5 (3) MG/3ML nebulizer solution 12/9/2019 Active   Ivabradine HCl (CORLANOR) 7.5 MG Tab 12/9/2019 Active   levothyroxine (SYNTHROID) 75 MCG Tab 12/9/2019 Active   Melatonin 5 MG Tab 12/8/2019 Active   methocarbamol (ROBAXIN) 750 MG Tab 12/9/2019 Active   montelukast (SINGULAIR) 10 MG Tab 12/9/2019 Active   mycophenolate (CELLCEPT) 500 MG tablet 12/9/2019 Active   ondansetron (ZOFRAN ODT) 4 MG TABLET DISPERSIBLE PRN Active   potassium chloride SA (KDUR) 20 MEQ Tab CR 12/9/2019 Active   predniSONE (DELTASONE) 10 MG Tab 12/9/2019 Active   pregabalin (LYRICA) 300 MG capsule 12/9/2019 Active   promethazine (PHENERGAN) 25 MG Suppos PRN Active   ranitidine (ZANTAC) 300 MG tablet 12/9/2019 Active   sodium bicarbonate 325 MG Tab 12/9/2019 Active   tiotropium (SPIRIVA) 18 MCG Cap 12/9/2019 Active   traZODone (DESYREL) 100 MG Tab 12/8/2019 Active   ziprasidone (GEODON) 80 MG Cap 12/9/2019 Active                ALLERGIES  Allergies   Allergen Reactions   • Cefdinir Shortness of Breath and Itching     Tolerated 1/18/17  Tolerates ceftriaxone  Tolerated augmentin 8/2019    • Depakote [Divalproex Sodium] Unspecified     Muscle spasms/muscle pain and weakness     • Doxycycline Anaphylaxis and Vomiting     RXN=unknown   • Amitriptyline Unspecified     Headaches     • Aripiprazole [Abilify] Unspecified     Headaches/muscle twitching     • Clindamycin Nausea     Even with food     • Ees [Erythromycin] Vomiting and Nausea   • Flagyl [Metronidazole Hcl] Unspecified     \"eye problems\"     • Flomax [Tamsulosin Hydrochloride] Swelling   • Levaquin Unspecified     Severe muscle cramps in legs  RXN=unknown   • Metformin Unspecified     " "Increased lactic acid      • Tape Rash     Tears skin off  coban with Tegaderm tape ok intermittently  RXN=ongoing   • Vancomycin Itching     Pt becomes flushed in face and chest.   RXN=7/10/16   • Wound Dressing Adhesive Hives     By pt report   • Cephalexin [Keflex] Rash     Pt states she gets a rash with this medication  Tolerates ceftriaxone       PHYSICAL EXAM  VITAL SIGNS: /56   Pulse 90   Temp 36.9 °C (98.4 °F) (Temporal)   Resp (!) 22   Ht 1.651 m (5' 5\")   Wt (!) 127 kg (280 lb)   LMP 07/01/2019 (LMP Unknown)   SpO2 99%   BMI 46.59 kg/m²     Constitutional: Well developed, Well nourished, mild distress.   HENT: Normocephalic, Atraumatic, Oropharynx moist.   Eyes: Conjunctiva normal, No discharge.   Neck: Supple, No stridor  Cardiovascular: Normal rhythm, No murmurs, equal pulses. Tachycardic rate  Pulmonary: No rales, No rhonchi. mild respritory distress, bilateral wheezing mostly upper airway  Chest: No chest wall tenderness or deformity.   Abdomen:Soft, No tenderness, No masses, no rebound, no guarding.   Back: No CVA tenderness.   Musculoskeletal: No major deformities noted, No tenderness. No edema in legs, no calf tenderness, no cords  Skin: Warm, Dry, No erythema, No rash.   Neurologic: Alert & oriented x 3, Normal motor function,  No focal deficits noted.   Psychiatric: Affect normal, Judgment normal, Mood normal.     LABS  Results for orders placed or performed during the hospital encounter of 12/09/19   URINALYSIS (UA)   Result Value Ref Range    Color Yellow     Character Clear     Specific Gravity 1.025 <1.035    Ph 6.5 5.0 - 8.0    Glucose 100 (A) Negative mg/dL    Ketones 15 (A) Negative mg/dL    Protein Negative Negative mg/dL    Bilirubin Negative Negative    Urobilinogen, Urine 0.2 Negative    Nitrite Negative Negative    Leukocyte Esterase Small (A) Negative    Occult Blood Large (A) Negative    Micro Urine Req Microscopic    HCG QUAL SERUM   Result Value Ref Range    Beta-Hcg " Qualitative Serum Negative Negative   CBC w/ Differential   Result Value Ref Range    WBC 5.2 4.8 - 10.8 K/uL    RBC 4.30 4.20 - 5.40 M/uL    Hemoglobin 13.4 12.0 - 16.0 g/dL    Hematocrit 40.0 37.0 - 47.0 %    MCV 93.0 81.4 - 97.8 fL    MCH 31.2 27.0 - 33.0 pg    MCHC 33.5 (L) 33.6 - 35.0 g/dL    RDW 43.6 35.9 - 50.0 fL    Platelet Count 138 (L) 164 - 446 K/uL    MPV 12.3 9.0 - 12.9 fL    Neutrophils-Polys 64.30 44.00 - 72.00 %    Lymphocytes 27.90 22.00 - 41.00 %    Monocytes 5.50 0.00 - 13.40 %    Eosinophils 1.50 0.00 - 6.90 %    Basophils 0.40 0.00 - 1.80 %    Immature Granulocytes 0.40 0.00 - 0.90 %    Nucleated RBC 0.00 /100 WBC    Neutrophils (Absolute) 3.37 2.00 - 7.15 K/uL    Lymphs (Absolute) 1.46 1.00 - 4.80 K/uL    Monos (Absolute) 0.29 0.00 - 0.85 K/uL    Eos (Absolute) 0.08 0.00 - 0.51 K/uL    Baso (Absolute) 0.02 0.00 - 0.12 K/uL    Immature Granulocytes (abs) 0.02 0.00 - 0.11 K/uL    NRBC (Absolute) 0.00 K/uL   Basic Metabolic Panel (BMP)   Result Value Ref Range    Sodium 142 135 - 145 mmol/L    Potassium 3.6 3.6 - 5.5 mmol/L    Chloride 110 96 - 112 mmol/L    Co2 16 (L) 20 - 33 mmol/L    Glucose 172 (H) 65 - 99 mg/dL    Bun 10 8 - 22 mg/dL    Creatinine 0.78 0.50 - 1.40 mg/dL    Calcium 8.8 8.5 - 10.5 mg/dL    Anion Gap 16.0 (H) 0.0 - 11.9   Influenza A/B By PCR (Adult - Flu Only)   Result Value Ref Range    Influenza virus A RNA Negative Negative    Influenza virus B, PCR Negative Negative   URINE MICROSCOPIC (W/UA)   Result Value Ref Range    WBC 10-20 (A) /hpf    RBC 10-20 (A) /hpf    Bacteria Few (A) None /hpf    Epithelial Cells Few /hpf    Hyaline Cast 0-2 /lpf   ESTIMATED GFR   Result Value Ref Range    GFR If African American >60 >60 mL/min/1.73 m 2    GFR If Non African American >60 >60 mL/min/1.73 m 2   PERIPHERAL SMEAR REVIEW   Result Value Ref Range    Peripheral Smear Review see below    DIFFERENTIAL COMMENT   Result Value Ref Range    Comments-Diff see below      *Note: Due to a  large number of results and/or encounters for the requested time period, some results have not been displayed. A complete set of results can be found in Results Review.       All labs reviewed by me.      RADIOLOGY  DX-CHEST-PORTABLE (1 VIEW)   Final Result      Hypoinflation without other evidence for acute cardiopulmonary disease.        The radiologist's interpretation of all radiological studies have been reviewed by me.    COURSE & MEDICAL DECISION MAKING  Pertinent Labs & Imaging studies reviewed. (See chart for details)    Reviewed Chart previous history:  Seen 12/4/19 in ED for a headache and slurred speech.     5:05 PM - Patient seen and examined at bedside. Patient will be treated with Duoneb nebulizer solution. Ordered Dx-chest, influenza A/B by PCR, CBC with diff, BMP, UA, EKG, and HCG qual to evaluate her symptoms. The differential diagnoses include but are not limited to: asthma exasperation, Pneumonia, or influenza. I informed the patient that I will have her receive a breathing treatment and will review her labs and radiology before any further intervention.     6:19 PM I reviewed the patient's test results which were reassuring. I informed her that I will give her another breathing treatment, and see how she responds. I also informed her that her influenza test was negative. I will also treat her with ativan injection 0.5 mg.     7:51 Patient was reevaluated at bedside after her second breathing treatment. There is no longer wheezing and the patient feels much better. I informed her for the plan of discharge and to return for any new or worsening symptoms. Patient verbalizes understanding and agreement to this plan of care.      Medical Decision Making: At this point time I think the patient has some reactive airway disease likely induced by a viral upper respiratory tract infection.  Patient also seems to have a urinary tract infection.  Patient was given several breathing treatments with that her  wheezing is gone away we will go ahead and place her on steroids.  For her UTI we will place her on Bactrim.  I do think there is a component of anxiety therefore patient was given Ativan for the     The patient will return for new or worsening symptoms and is stable at the time of discharge.    The patient is referred to a primary physician for blood pressure management, diabetic screening, and for all other preventative health concerns.        DISPOSITION:  Patient will be discharged home in stable condition.    FOLLOW UP:  Torres Brody M.D.  [address] [place] [id number]  [phone]    Schedule an appointment as soon as possible for a visit in 2 days        OUTPATIENT MEDICATIONS:  Discharge Medication List as of 12/9/2019  8:45 PM      START taking these medications    Details   sulfamethoxazole-trimethoprim (BACTRIM DS) 800-160 MG tablet Take 1 Tab by mouth every 12 hours for 3 days., Disp-6 Tab, R-0, Print Rx Paper            FINAL IMPRESSION  1. Moderate persistent asthma with acute exacerbation    2. Viral upper respiratory tract infection    3. Acute UTI          Azam RODRIGUEZ), am scribing for, and in the presence of, Glynn Hinds M.D.    Electronically signed by: Azam Espinosa), 12/9/2019    Glynn RODRIGUEZ M.D. personally performed the services described in this documentation, as scribed by Azam Zuniga in my presence, and it is both accurate and complete. C.    The note accurately reflects work and decisions made by me.  Glynn Hinds  12/10/2019  1:30 AM

## 2019-12-10 NOTE — FLOWSHEET NOTE
08/15/19 1243   [employer]   #SVN Performed Yes   Given By: Mouthpiece   Chest Exam   Respiration 16   Pulse (!) 104   Breath Sounds   RUL Breath Sounds Diminished   RML Breath Sounds Diminished   RLL Breath Sounds Diminished   MARY Breath Sounds Diminished   LLL Breath Sounds Diminished   Secretions   Cough Dry   Oxygen   Pulse Oximetry 96 %   O2 (LPM) 3   O2 Daily Delivery Respiratory  Silicone Nasal Cannula      Called patient.  No answer.  Left message on answering machine to call back.     PSR if patient calls back please advise patient that the hip MRI order was placed but we are not able to obtain an MRI of the knee.  Patient is seeing Dr. Ordoñez for the knee and any additional imaging of the knee will need to be determined by Dr. Ordoñez.

## 2019-12-10 NOTE — ED NOTES
The patient has been provided with discharge education and information.  The patient was also provided with instructions on follow up care and return precautions.  The patient verbalizes understanding of discharge instructions, follow up care, and return precautions.  All questions have been answered.  Bactrim and Prednisone RX written by Adenike CADE, good color and appropriate at time of discharge.  Patient wheeled out of department.

## 2019-12-10 NOTE — ED NOTES
Med rec complete per pt at bedside   Allergies reviewed  Pt started an Augmentin last week, took 2 doses and was told by PCP to stop

## 2019-12-10 NOTE — DISCHARGE INSTRUCTIONS
Return if you have increasing shortness of breath, productive cough, cough up blood, abdominal pain or fever that will not go down with Tylenol or Ibuprofen.

## 2019-12-10 NOTE — ED TRIAGE NOTES
30 y/o female bib ambulance from home for evaluation of sob. Pt states her symptoms began aprox 90 minutes ago. She used her home nebulizer and inhaler without relief of symptoms. Pt was given 1 albuterol and 1 racemic epi treatment- per EMS, the racemic epi helped the sob briefly. Pt has expiratory wheezing upon arrival to ED. sats 100% on 4L which pt wears intermittently at home (2-4L). Able to speak in 3-4 word sentences.

## 2019-12-11 ENCOUNTER — TELEPHONE (OUTPATIENT)
Dept: MEDICAL GROUP | Facility: MEDICAL CENTER | Age: 29
End: 2019-12-11

## 2019-12-12 NOTE — TELEPHONE ENCOUNTER
VOICEMAIL  1. Caller Name: Vonda                      Call Back Number: [phone]    2. Message: Home care saw patient today and she was just in the ER for virual bronchitis. She is complaining of SOB. She has done 4 puffs on her albuterol inhaler and 3 nebulizer treatments today. She is on 5L of O2. Her saturation was 99%, pulse 84, BP was 150/98 and respirations were 24. She was given ER precautions by the nurse.    3. Patient approves office to leave a detailed voicemail/MyChart message: N\A

## 2019-12-13 ENCOUNTER — APPOINTMENT (OUTPATIENT)
Dept: RADIOLOGY | Facility: MEDICAL CENTER | Age: 29
DRG: 074 | End: 2019-12-13
Attending: EMERGENCY MEDICINE
Payer: MEDICARE

## 2019-12-13 ENCOUNTER — HOSPITAL ENCOUNTER (INPATIENT)
Facility: MEDICAL CENTER | Age: 29
LOS: 16 days | DRG: 074 | End: 2019-12-31
Attending: EMERGENCY MEDICINE | Admitting: INTERNAL MEDICINE
Payer: MEDICARE

## 2019-12-13 DIAGNOSIS — E66.01 MORBIDLY OBESE (HCC): ICD-10-CM

## 2019-12-13 DIAGNOSIS — M25.551 PAIN OF RIGHT HIP JOINT: ICD-10-CM

## 2019-12-13 DIAGNOSIS — R53.81 DEBILITY: ICD-10-CM

## 2019-12-13 DIAGNOSIS — M25.561 CHRONIC PAIN OF RIGHT KNEE: ICD-10-CM

## 2019-12-13 DIAGNOSIS — G89.4 CHRONIC PAIN SYNDROME: ICD-10-CM

## 2019-12-13 DIAGNOSIS — R55 CARDIAC SYNCOPE: ICD-10-CM

## 2019-12-13 DIAGNOSIS — G47.33 OSA (OBSTRUCTIVE SLEEP APNEA): ICD-10-CM

## 2019-12-13 DIAGNOSIS — G89.29 CHRONIC PAIN OF RIGHT KNEE: ICD-10-CM

## 2019-12-13 DIAGNOSIS — M79.672 HEEL PAIN, BILATERAL: ICD-10-CM

## 2019-12-13 DIAGNOSIS — M79.671 HEEL PAIN, BILATERAL: ICD-10-CM
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
## 2019-12-13 DIAGNOSIS — N31.9 NEUROGENIC BLADDER: ICD-10-CM

## 2019-12-13 LAB
ALBUMIN SERPL BCP-MCNC: 4.4 G/DL (ref 3.2–4.9)
ALBUMIN/GLOB SERPL: 1.8 G/DL
ALP SERPL-CCNC: 52 U/L (ref 30–99)
ALT SERPL-CCNC: 28 U/L (ref 2–50)
ANION GAP SERPL CALC-SCNC: 12 MMOL/L (ref 0–11.9)
AST SERPL-CCNC: 29 U/L (ref 12–45)
BASOPHILS # BLD AUTO: 0.1 % (ref 0–1.8)
BASOPHILS # BLD: 0.01 K/UL (ref 0–0.12)
BILIRUB SERPL-MCNC: 0.4 MG/DL (ref 0.1–1.5)
BUN SERPL-MCNC: 14 MG/DL (ref 8–22)
CALCIUM SERPL-MCNC: 9.5 MG/DL (ref 8.5–10.5)
CHLORIDE SERPL-SCNC: 106 MMOL/L (ref 96–112)
CK SERPL-CCNC: 36 U/L (ref 0–154)
CO2 SERPL-SCNC: 16 MMOL/L (ref 20–33)
COMMENT 1642: NORMAL
CREAT SERPL-MCNC: 0.78 MG/DL (ref 0.5–1.4)
EKG IMPRESSION: NORMAL
EOSINOPHIL # BLD AUTO: 0.01 K/UL (ref 0–0.51)
EOSINOPHIL NFR BLD: 0.1 % (ref 0–6.9)
ERYTHROCYTE [DISTWIDTH] IN BLOOD BY AUTOMATED COUNT: 44 FL (ref 35.9–50)
GLOBULIN SER CALC-MCNC: 2.4 G/DL (ref 1.9–3.5)
GLUCOSE BLD-MCNC: 123 MG/DL (ref 65–99)
GLUCOSE SERPL-MCNC: 237 MG/DL (ref 65–99)
HCT VFR BLD AUTO: 41.5 % (ref 37–47)
HGB BLD-MCNC: 13.5 G/DL (ref 12–16)
IMM GRANULOCYTES # BLD AUTO: 0.07 K/UL (ref 0–0.11)
IMM GRANULOCYTES NFR BLD AUTO: 0.9 % (ref 0–0.9)
LYMPHOCYTES # BLD AUTO: 0.78 K/UL (ref 1–4.8)
LYMPHOCYTES NFR BLD: 10.1 % (ref 22–41)
MCH RBC QN AUTO: 30.6 PG (ref 27–33)
MCHC RBC AUTO-ENTMCNC: 32.5 G/DL (ref 33.6–35)
MCV RBC AUTO: 94.1 FL (ref 81.4–97.8)
MONOCYTES # BLD AUTO: 0.35 K/UL (ref 0–0.85)
MONOCYTES NFR BLD AUTO: 4.5 % (ref 0–13.4)
MORPHOLOGY BLD-IMP: NORMAL
NEUTROPHILS # BLD AUTO: 6.49 K/UL (ref 2–7.15)
NEUTROPHILS NFR BLD: 84.3 % (ref 44–72)
NRBC # BLD AUTO: 0 K/UL
NRBC BLD-RTO: 0 /100 WBC
PLATELET # BLD AUTO: 174 K/UL (ref 164–446)
PLATELET BLD QL SMEAR: NORMAL
PMV BLD AUTO: 12.2 FL (ref 9–12.9)
POTASSIUM SERPL-SCNC: 5.1 MMOL/L (ref 3.6–5.5)
PROT SERPL-MCNC: 6.8 G/DL (ref 6–8.2)
RBC # BLD AUTO: 4.41 M/UL (ref 4.2–5.4)
SODIUM SERPL-SCNC: 134 MMOL/L (ref 135–145)
TROPONIN T SERPL-MCNC: <6 NG/L (ref 6–19)
WBC # BLD AUTO: 7.7 K/UL (ref 4.8–10.8)

## 2019-12-13 PROCEDURE — 80053 COMPREHEN METABOLIC PANEL: CPT

## 2019-12-13 PROCEDURE — G0378 HOSPITAL OBSERVATION PER HR: HCPCS

## 2019-12-13 PROCEDURE — 700111 HCHG RX REV CODE 636 W/ 250 OVERRIDE (IP): Performed by: EMERGENCY MEDICINE

## 2019-12-13 PROCEDURE — 93005 ELECTROCARDIOGRAM TRACING: CPT | Performed by: EMERGENCY MEDICINE

## 2019-12-13 PROCEDURE — 96374 THER/PROPH/DIAG INJ IV PUSH: CPT

## 2019-12-13 PROCEDURE — 99220 PR INITIAL OBSERVATION CARE,LEVL III: CPT | Performed by: INTERNAL MEDICINE

## 2019-12-13 PROCEDURE — A9270 NON-COVERED ITEM OR SERVICE: HCPCS | Performed by: INTERNAL MEDICINE

## 2019-12-13 PROCEDURE — 99285 EMERGENCY DEPT VISIT HI MDM: CPT

## 2019-12-13 PROCEDURE — 71045 X-RAY EXAM CHEST 1 VIEW: CPT

## 2019-12-13 PROCEDURE — 85025 COMPLETE CBC W/AUTO DIFF WBC: CPT

## 2019-12-13 PROCEDURE — 82550 ASSAY OF CK (CPK): CPT

## 2019-12-13 PROCEDURE — 82962 GLUCOSE BLOOD TEST: CPT

## 2019-12-13 PROCEDURE — 84484 ASSAY OF TROPONIN QUANT: CPT

## 2019-12-13 PROCEDURE — 700102 HCHG RX REV CODE 250 W/ 637 OVERRIDE(OP): Performed by: INTERNAL MEDICINE

## 2019-12-13 PROCEDURE — 700102 HCHG RX REV CODE 250 W/ 637 OVERRIDE(OP): Performed by: EMERGENCY MEDICINE

## 2019-12-13 PROCEDURE — A9270 NON-COVERED ITEM OR SERVICE: HCPCS | Performed by: EMERGENCY MEDICINE

## 2019-12-13 PROCEDURE — 700111 HCHG RX REV CODE 636 W/ 250 OVERRIDE (IP): Performed by: INTERNAL MEDICINE

## 2019-12-13 PROCEDURE — 93005 ELECTROCARDIOGRAM TRACING: CPT

## 2019-12-13 RX ORDER — PROCHLORPERAZINE EDISYLATE 5 MG/ML
5-10 INJECTION INTRAMUSCULAR; INTRAVENOUS EVERY 4 HOURS PRN
Status: DISCONTINUED | OUTPATIENT
Start: 2019-12-13 | End: 2019-12-14

## 2019-12-13 RX ORDER — LEVOTHYROXINE SODIUM 0.07 MG/1
75 TABLET ORAL
Status: DISCONTINUED | OUTPATIENT
Start: 2019-12-14 | End: 2019-12-31 | Stop reason: HOSPADM

## 2019-12-13 RX ORDER — ZIPRASIDONE HYDROCHLORIDE 80 MG/1
80 CAPSULE ORAL 2 TIMES DAILY
Status: DISCONTINUED | OUTPATIENT
Start: 2019-12-13 | End: 2019-12-29

## 2019-12-13 RX ORDER — TIOTROPIUM BROMIDE 18 UG/1
1 CAPSULE ORAL; RESPIRATORY (INHALATION) DAILY
Status: DISCONTINUED | OUTPATIENT
Start: 2019-12-14 | End: 2019-12-31 | Stop reason: HOSPADM

## 2019-12-13 RX ORDER — BUTALBITAL, ACETAMINOPHEN AND CAFFEINE 50; 325; 40 MG/1; MG/1; MG/1
1 TABLET ORAL EVERY 6 HOURS PRN
Status: DISCONTINUED | OUTPATIENT
Start: 2019-12-13 | End: 2019-12-31 | Stop reason: HOSPADM

## 2019-12-13 RX ORDER — SODIUM BICARBONATE 650 MG/1
650 TABLET ORAL 2 TIMES DAILY
Status: DISCONTINUED | OUTPATIENT
Start: 2019-12-13 | End: 2019-12-27

## 2019-12-13 RX ORDER — POTASSIUM CHLORIDE 20 MEQ/1
20 TABLET, EXTENDED RELEASE ORAL 2 TIMES DAILY
Status: DISCONTINUED | OUTPATIENT
Start: 2019-12-13 | End: 2019-12-31 | Stop reason: HOSPADM

## 2019-12-13 RX ORDER — ACETAMINOPHEN 325 MG/1
650 TABLET ORAL ONCE
Status: COMPLETED | OUTPATIENT
Start: 2019-12-13 | End: 2019-12-13

## 2019-12-13 RX ORDER — FUROSEMIDE 40 MG/1
80 TABLET ORAL DAILY
Status: DISCONTINUED | OUTPATIENT
Start: 2019-12-14 | End: 2019-12-31 | Stop reason: HOSPADM

## 2019-12-13 RX ORDER — PREDNISONE 20 MG/1
60 TABLET ORAL DAILY
Status: ON HOLD | COMMUNITY
End: 2019-12-31

## 2019-12-13 RX ORDER — PROMETHAZINE HYDROCHLORIDE 25 MG/1
12.5-25 SUPPOSITORY RECTAL EVERY 4 HOURS PRN
Status: DISCONTINUED | OUTPATIENT
Start: 2019-12-13 | End: 2019-12-14

## 2019-12-13 RX ORDER — ACETAMINOPHEN 325 MG/1
650 TABLET ORAL EVERY 6 HOURS PRN
Status: DISCONTINUED | OUTPATIENT
Start: 2019-12-13 | End: 2019-12-31 | Stop reason: HOSPADM

## 2019-12-13 RX ORDER — BISACODYL 10 MG
10 SUPPOSITORY, RECTAL RECTAL
Status: DISCONTINUED | OUTPATIENT
Start: 2019-12-13 | End: 2019-12-31 | Stop reason: HOSPADM

## 2019-12-13 RX ORDER — ONDANSETRON 4 MG/1
4 TABLET, ORALLY DISINTEGRATING ORAL EVERY 4 HOURS PRN
Status: DISCONTINUED | OUTPATIENT
Start: 2019-12-13 | End: 2019-12-31 | Stop reason: HOSPADM

## 2019-12-13 RX ORDER — PREGABALIN 150 MG/1
300 CAPSULE ORAL 2 TIMES DAILY
Status: DISCONTINUED | OUTPATIENT
Start: 2019-12-13 | End: 2019-12-15

## 2019-12-13 RX ORDER — PROMETHAZINE HYDROCHLORIDE 25 MG/1
12.5-25 TABLET ORAL EVERY 4 HOURS PRN
Status: DISCONTINUED | OUTPATIENT
Start: 2019-12-13 | End: 2019-12-14

## 2019-12-13 RX ORDER — TRAZODONE HYDROCHLORIDE 100 MG/1
100 TABLET ORAL EVERY EVENING
Status: DISCONTINUED | OUTPATIENT
Start: 2019-12-13 | End: 2019-12-18

## 2019-12-13 RX ORDER — MYCOPHENOLATE MOFETIL 250 MG/1
1000 CAPSULE ORAL 2 TIMES DAILY
Status: DISCONTINUED | OUTPATIENT
Start: 2019-12-13 | End: 2019-12-31 | Stop reason: HOSPADM

## 2019-12-13 RX ORDER — AMOXICILLIN AND CLAVULANATE POTASSIUM 875; 125 MG/1; MG/1
1 TABLET, FILM COATED ORAL 2 TIMES DAILY
Status: ON HOLD | COMMUNITY
Start: 2019-12-05 | End: 2019-12-31

## 2019-12-13 RX ORDER — ONDANSETRON 2 MG/ML
4 INJECTION INTRAMUSCULAR; INTRAVENOUS ONCE
Status: COMPLETED | OUTPATIENT
Start: 2019-12-13 | End: 2019-12-13

## 2019-12-13 RX ORDER — METHOCARBAMOL 750 MG/1
750 TABLET, FILM COATED ORAL 3 TIMES DAILY
Status: DISCONTINUED | OUTPATIENT
Start: 2019-12-14 | End: 2019-12-14

## 2019-12-13 RX ORDER — GABAPENTIN 300 MG/1
300 CAPSULE ORAL 4 TIMES DAILY
Status: DISCONTINUED | OUTPATIENT
Start: 2019-12-13 | End: 2019-12-18

## 2019-12-13 RX ORDER — BUDESONIDE AND FORMOTEROL FUMARATE DIHYDRATE 160; 4.5 UG/1; UG/1
2 AEROSOL RESPIRATORY (INHALATION) 2 TIMES DAILY
Status: DISCONTINUED | OUTPATIENT
Start: 2019-12-13 | End: 2019-12-31 | Stop reason: HOSPADM

## 2019-12-13 RX ORDER — SULFAMETHOXAZOLE AND TRIMETHOPRIM 800; 160 MG/1; MG/1
1 TABLET ORAL 2 TIMES DAILY
Status: ON HOLD | COMMUNITY
Start: 2019-12-09 | End: 2019-12-31

## 2019-12-13 RX ORDER — ONDANSETRON 2 MG/ML
4 INJECTION INTRAMUSCULAR; INTRAVENOUS EVERY 4 HOURS PRN
Status: DISCONTINUED | OUTPATIENT
Start: 2019-12-13 | End: 2019-12-17

## 2019-12-13 RX ORDER — FAMOTIDINE 20 MG/1
20 TABLET, FILM COATED ORAL EVERY MORNING
Status: DISCONTINUED | OUTPATIENT
Start: 2019-12-14 | End: 2019-12-31 | Stop reason: HOSPADM

## 2019-12-13 RX ORDER — FLUOXETINE HYDROCHLORIDE 20 MG/1
40 CAPSULE ORAL DAILY
Status: DISCONTINUED | OUTPATIENT
Start: 2019-12-14 | End: 2019-12-31 | Stop reason: HOSPADM

## 2019-12-13 RX ORDER — BUSPIRONE HYDROCHLORIDE 10 MG/1
10 TABLET ORAL 2 TIMES DAILY
Status: DISCONTINUED | OUTPATIENT
Start: 2019-12-13 | End: 2019-12-31 | Stop reason: HOSPADM

## 2019-12-13 RX ORDER — AMOXICILLIN 250 MG
2 CAPSULE ORAL 2 TIMES DAILY
Status: DISCONTINUED | OUTPATIENT
Start: 2019-12-13 | End: 2019-12-31 | Stop reason: HOSPADM

## 2019-12-13 RX ORDER — POLYETHYLENE GLYCOL 3350 17 G/17G
1 POWDER, FOR SOLUTION ORAL
Status: DISCONTINUED | OUTPATIENT
Start: 2019-12-13 | End: 2019-12-31 | Stop reason: HOSPADM

## 2019-12-13 RX ORDER — DIPHENHYDRAMINE HCL 25 MG
25 TABLET ORAL NIGHTLY PRN
Status: DISCONTINUED | OUTPATIENT
Start: 2019-12-13 | End: 2019-12-31 | Stop reason: HOSPADM

## 2019-12-13 RX ORDER — ENALAPRILAT 1.25 MG/ML
1.25 INJECTION INTRAVENOUS EVERY 6 HOURS PRN
Status: DISCONTINUED | OUTPATIENT
Start: 2019-12-13 | End: 2019-12-31 | Stop reason: HOSPADM

## 2019-12-13 RX ADMIN — SENNOSIDES AND DOCUSATE SODIUM 2 TABLET: 8.6; 5 TABLET ORAL at 22:40

## 2019-12-13 RX ADMIN — MYCOPHENOLATE MOFETIL 1000 MG: 250 CAPSULE ORAL at 22:44

## 2019-12-13 RX ADMIN — BUTALBITAL, ACETAMINOPHEN, AND CAFFEINE 1 TABLET: 50; 325; 40 TABLET ORAL at 22:42

## 2019-12-13 RX ADMIN — BUSPIRONE HYDROCHLORIDE 10 MG: 10 TABLET ORAL at 22:47

## 2019-12-13 RX ADMIN — BUDESONIDE AND FORMOTEROL FUMARATE DIHYDRATE 2 PUFF: 160; 4.5 AEROSOL RESPIRATORY (INHALATION) at 22:38

## 2019-12-13 RX ADMIN — ONDANSETRON 4 MG: 2 INJECTION INTRAMUSCULAR; INTRAVENOUS at 18:55

## 2019-12-13 RX ADMIN — ZIPRASIDONE HYDROCHLORIDE 80 MG: 80 CAPSULE ORAL at 22:46

## 2019-12-13 RX ADMIN — POTASSIUM CHLORIDE 20 MEQ: 20 TABLET, EXTENDED RELEASE ORAL at 22:41

## 2019-12-13 RX ADMIN — ACETAMINOPHEN 650 MG: 325 TABLET, FILM COATED ORAL at 19:29

## 2019-12-13 RX ADMIN — SODIUM BICARBONATE 650 MG: 650 TABLET ORAL at 22:43

## 2019-12-13 RX ADMIN — TRAZODONE HYDROCHLORIDE 100 MG: 100 TABLET ORAL at 22:41

## 2019-12-13 RX ADMIN — GABAPENTIN 300 MG: 300 CAPSULE ORAL at 22:40

## 2019-12-13 ASSESSMENT — LIFESTYLE VARIABLES
EVER_SMOKED: NEVER
TOTAL SCORE: 0
ON A TYPICAL DAY WHEN YOU DRINK ALCOHOL HOW MANY DRINKS DO YOU HAVE: 0
ALCOHOL_USE: NO
CONSUMPTION TOTAL: NEGATIVE
EVER FELT BAD OR GUILTY ABOUT YOUR DRINKING: NO
TOTAL SCORE: 0
EVER HAD A DRINK FIRST THING IN THE MORNING TO STEADY YOUR NERVES TO GET RID OF A HANGOVER: NO
TOTAL SCORE: 0
HAVE YOU EVER FELT YOU SHOULD CUT DOWN ON YOUR DRINKING: NO
AVERAGE NUMBER OF DAYS PER WEEK YOU HAVE A DRINK CONTAINING ALCOHOL: 0
HAVE PEOPLE ANNOYED YOU BY CRITICIZING YOUR DRINKING: NO
HOW MANY TIMES IN THE PAST YEAR HAVE YOU HAD 5 OR MORE DRINKS IN A DAY: 0

## 2019-12-14 ENCOUNTER — APPOINTMENT (OUTPATIENT)
Dept: RADIOLOGY | Facility: MEDICAL CENTER | Age: 29
DRG: 074 | End: 2019-12-14
Attending: INTERNAL MEDICINE
Payer: MEDICARE

## 2019-12-14 PROBLEM — R40.4 ALTERED LEVEL OF CONSCIOUSNESS: Status: ACTIVE | Noted: 2019-12-14

## 2019-12-14 PROBLEM — E11.65 CONTROLLED TYPE 2 DIABETES MELLITUS WITH HYPERGLYCEMIA (HCC): Status: ACTIVE | Noted: 2017-04-26

## 2019-12-14 LAB
AMPHET UR QL SCN: NEGATIVE
ANION GAP SERPL CALC-SCNC: 10 MMOL/L (ref 0–11.9)
BARBITURATES UR QL SCN: POSITIVE
BENZODIAZ UR QL SCN: NEGATIVE
BUN SERPL-MCNC: 14 MG/DL (ref 8–22)
BZE UR QL SCN: NEGATIVE
CALCIUM SERPL-MCNC: 9.2 MG/DL (ref 8.5–10.5)
CANNABINOIDS UR QL SCN: NEGATIVE
CHLORIDE SERPL-SCNC: 106 MMOL/L (ref 96–112)
CO2 SERPL-SCNC: 23 MMOL/L (ref 20–33)
CREAT SERPL-MCNC: 0.68 MG/DL (ref 0.5–1.4)
ERYTHROCYTE [DISTWIDTH] IN BLOOD BY AUTOMATED COUNT: 42 FL (ref 35.9–50)
GLUCOSE BLD-MCNC: 92 MG/DL (ref 65–99)
GLUCOSE BLD-MCNC: 93 MG/DL (ref 65–99)
GLUCOSE BLD-MCNC: 93 MG/DL (ref 65–99)
GLUCOSE BLD-MCNC: 98 MG/DL (ref 65–99)
GLUCOSE SERPL-MCNC: 81 MG/DL (ref 65–99)
HCT VFR BLD AUTO: 38.5 % (ref 37–47)
HGB BLD-MCNC: 13.2 G/DL (ref 12–16)
MCH RBC QN AUTO: 31.2 PG (ref 27–33)
MCHC RBC AUTO-ENTMCNC: 34.3 G/DL (ref 33.6–35)
MCV RBC AUTO: 91 FL (ref 81.4–97.8)
METHADONE UR QL SCN: NEGATIVE
OPIATES UR QL SCN: NEGATIVE
OXYCODONE UR QL SCN: NEGATIVE
PCP UR QL SCN: NEGATIVE
PLATELET # BLD AUTO: 166 K/UL (ref 164–446)
PMV BLD AUTO: 11.7 FL (ref 9–12.9)
POTASSIUM SERPL-SCNC: 3.9 MMOL/L (ref 3.6–5.5)
PROPOXYPH UR QL SCN: NEGATIVE
RBC # BLD AUTO: 4.23 M/UL (ref 4.2–5.4)
SODIUM SERPL-SCNC: 139 MMOL/L (ref 135–145)
WBC # BLD AUTO: 7.3 K/UL (ref 4.8–10.8)

## 2019-12-14 PROCEDURE — 99225 PR SUBSEQUENT OBSERVATION CARE,LEVEL II: CPT | Performed by: INTERNAL MEDICINE

## 2019-12-14 PROCEDURE — 96372 THER/PROPH/DIAG INJ SC/IM: CPT

## 2019-12-14 PROCEDURE — 700111 HCHG RX REV CODE 636 W/ 250 OVERRIDE (IP): Performed by: NURSE PRACTITIONER

## 2019-12-14 PROCEDURE — 97161 PT EVAL LOW COMPLEX 20 MIN: CPT | Performed by: PHYSICAL THERAPIST

## 2019-12-14 PROCEDURE — 80307 DRUG TEST PRSMV CHEM ANLYZR: CPT

## 2019-12-14 PROCEDURE — G0378 HOSPITAL OBSERVATION PER HR: HCPCS

## 2019-12-14 PROCEDURE — 80048 BASIC METABOLIC PNL TOTAL CA: CPT

## 2019-12-14 PROCEDURE — A9270 NON-COVERED ITEM OR SERVICE: HCPCS | Performed by: INTERNAL MEDICINE

## 2019-12-14 PROCEDURE — 70450 CT HEAD/BRAIN W/O DYE: CPT

## 2019-12-14 PROCEDURE — 82962 GLUCOSE BLOOD TEST: CPT

## 2019-12-14 PROCEDURE — 700101 HCHG RX REV CODE 250: Performed by: INTERNAL MEDICINE

## 2019-12-14 PROCEDURE — 36415 COLL VENOUS BLD VENIPUNCTURE: CPT

## 2019-12-14 PROCEDURE — 94640 AIRWAY INHALATION TREATMENT: CPT

## 2019-12-14 PROCEDURE — 700102 HCHG RX REV CODE 250 W/ 637 OVERRIDE(OP): Performed by: INTERNAL MEDICINE

## 2019-12-14 PROCEDURE — 700111 HCHG RX REV CODE 636 W/ 250 OVERRIDE (IP): Performed by: INTERNAL MEDICINE

## 2019-12-14 PROCEDURE — 85027 COMPLETE CBC AUTOMATED: CPT

## 2019-12-14 RX ORDER — ALBUTEROL SULFATE 90 UG/1
2 AEROSOL, METERED RESPIRATORY (INHALATION) EVERY 4 HOURS PRN
Status: DISCONTINUED | OUTPATIENT
Start: 2019-12-14 | End: 2019-12-31 | Stop reason: HOSPADM

## 2019-12-14 RX ORDER — AMOXICILLIN AND CLAVULANATE POTASSIUM 875; 125 MG/1; MG/1
1 TABLET, FILM COATED ORAL EVERY 12 HOURS
Status: COMPLETED | OUTPATIENT
Start: 2019-12-14 | End: 2019-12-23

## 2019-12-14 RX ADMIN — POTASSIUM CHLORIDE 20 MEQ: 20 TABLET, EXTENDED RELEASE ORAL at 17:10

## 2019-12-14 RX ADMIN — TIOTROPIUM BROMIDE 1 CAPSULE: 18 CAPSULE ORAL; RESPIRATORY (INHALATION) at 05:31

## 2019-12-14 RX ADMIN — FUROSEMIDE 80 MG: 40 TABLET ORAL at 05:34

## 2019-12-14 RX ADMIN — MYCOPHENOLATE MOFETIL 1000 MG: 250 CAPSULE ORAL at 17:10

## 2019-12-14 RX ADMIN — POTASSIUM CHLORIDE 20 MEQ: 20 TABLET, EXTENDED RELEASE ORAL at 05:36

## 2019-12-14 RX ADMIN — AMOXICILLIN AND CLAVULANATE POTASSIUM 1 TABLET: 875; 125 TABLET, FILM COATED ORAL at 09:40

## 2019-12-14 RX ADMIN — PREGABALIN 300 MG: 150 CAPSULE ORAL at 05:35

## 2019-12-14 RX ADMIN — ONDANSETRON 4 MG: 4 TABLET, ORALLY DISINTEGRATING ORAL at 11:13

## 2019-12-14 RX ADMIN — ASPIRIN 81 MG: 81 TABLET, COATED ORAL at 05:33

## 2019-12-14 RX ADMIN — BUTALBITAL, ACETAMINOPHEN, AND CAFFEINE 1 TABLET: 50; 325; 40 TABLET ORAL at 12:22

## 2019-12-14 RX ADMIN — METHOCARBAMOL TABLETS 750 MG: 750 TABLET, COATED ORAL at 05:36

## 2019-12-14 RX ADMIN — BUTALBITAL, ACETAMINOPHEN, AND CAFFEINE 1 TABLET: 50; 325; 40 TABLET ORAL at 05:38

## 2019-12-14 RX ADMIN — BUTALBITAL, ACETAMINOPHEN, AND CAFFEINE 1 TABLET: 50; 325; 40 TABLET ORAL at 22:17

## 2019-12-14 RX ADMIN — ZIPRASIDONE HYDROCHLORIDE 80 MG: 80 CAPSULE ORAL at 05:41

## 2019-12-14 RX ADMIN — ZIPRASIDONE HYDROCHLORIDE 80 MG: 80 CAPSULE ORAL at 17:14

## 2019-12-14 RX ADMIN — ONDANSETRON 4 MG: 4 TABLET, ORALLY DISINTEGRATING ORAL at 04:05

## 2019-12-14 RX ADMIN — SODIUM BICARBONATE 650 MG: 650 TABLET ORAL at 17:10

## 2019-12-14 RX ADMIN — BUDESONIDE AND FORMOTEROL FUMARATE DIHYDRATE 2 PUFF: 160; 4.5 AEROSOL RESPIRATORY (INHALATION) at 17:10

## 2019-12-14 RX ADMIN — POLYETHYLENE GLYCOL 3350 1 PACKET: 17 POWDER, FOR SOLUTION ORAL at 17:14

## 2019-12-14 RX ADMIN — SENNOSIDES AND DOCUSATE SODIUM 2 TABLET: 8.6; 5 TABLET ORAL at 05:33

## 2019-12-14 RX ADMIN — BUDESONIDE AND FORMOTEROL FUMARATE DIHYDRATE 2 PUFF: 160; 4.5 AEROSOL RESPIRATORY (INHALATION) at 05:29

## 2019-12-14 RX ADMIN — FAMOTIDINE 20 MG: 20 TABLET ORAL at 05:35

## 2019-12-14 RX ADMIN — BUSPIRONE HYDROCHLORIDE 10 MG: 10 TABLET ORAL at 05:42

## 2019-12-14 RX ADMIN — BUSPIRONE HYDROCHLORIDE 10 MG: 10 TABLET ORAL at 17:10

## 2019-12-14 RX ADMIN — TRAZODONE HYDROCHLORIDE 100 MG: 100 TABLET ORAL at 17:10

## 2019-12-14 RX ADMIN — AMOXICILLIN AND CLAVULANATE POTASSIUM 1 TABLET: 875; 125 TABLET, FILM COATED ORAL at 17:10

## 2019-12-14 RX ADMIN — GABAPENTIN 300 MG: 300 CAPSULE ORAL at 17:10

## 2019-12-14 RX ADMIN — FLUOXETINE HYDROCHLORIDE 40 MG: 20 CAPSULE ORAL at 05:33

## 2019-12-14 RX ADMIN — GABAPENTIN 300 MG: 300 CAPSULE ORAL at 11:13

## 2019-12-14 RX ADMIN — SODIUM BICARBONATE 650 MG: 650 TABLET ORAL at 05:37

## 2019-12-14 RX ADMIN — SENNOSIDES AND DOCUSATE SODIUM 2 TABLET: 8.6; 5 TABLET ORAL at 17:10

## 2019-12-14 RX ADMIN — GABAPENTIN 300 MG: 300 CAPSULE ORAL at 09:39

## 2019-12-14 RX ADMIN — GABAPENTIN 300 MG: 300 CAPSULE ORAL at 22:17

## 2019-12-14 RX ADMIN — ALBUTEROL SULFATE 2.5 MG: 2.5 SOLUTION RESPIRATORY (INHALATION) at 13:18

## 2019-12-14 RX ADMIN — MYCOPHENOLATE MOFETIL 1000 MG: 250 CAPSULE ORAL at 05:39

## 2019-12-14 RX ADMIN — LEVOTHYROXINE SODIUM 75 MCG: 75 TABLET ORAL at 05:41

## 2019-12-14 RX ADMIN — ENOXAPARIN SODIUM 40 MG: 100 INJECTION SUBCUTANEOUS at 11:14

## 2019-12-14 RX ADMIN — ACETAMINOPHEN 650 MG: 325 TABLET, FILM COATED ORAL at 11:14

## 2019-12-14 ASSESSMENT — LIFESTYLE VARIABLES
EVER_SMOKED: NEVER
SUBSTANCE_ABUSE: 0

## 2019-12-14 ASSESSMENT — ENCOUNTER SYMPTOMS
BACK PAIN: 0
ABDOMINAL PAIN: 1
VOMITING: 0
CONSTIPATION: 1
TREMORS: 0
EYE DISCHARGE: 0
SEIZURES: 0
SINUS PAIN: 0
BLOOD IN STOOL: 0
SENSORY CHANGE: 1
BLURRED VISION: 1
EYE REDNESS: 0
FLANK PAIN: 0
SORE THROAT: 0
PALPITATIONS: 0
DEPRESSION: 0
SPEECH CHANGE: 0
HEARTBURN: 0
EYE PAIN: 1
FEVER: 0
FALLS: 1
CHILLS: 0
NERVOUS/ANXIOUS: 0
NECK PAIN: 0
DIARRHEA: 0
DIAPHORESIS: 0
DIZZINESS: 1
PHOTOPHOBIA: 1
WHEEZING: 0
MEMORY LOSS: 1
WEAKNESS: 0
FOCAL WEAKNESS: 0
MYALGIAS: 1
NAUSEA: 1
SPUTUM PRODUCTION: 0
WEIGHT LOSS: 0
HEADACHES: 1
COUGH: 0
SHORTNESS OF BREATH: 0
TINGLING: 1

## 2019-12-14 ASSESSMENT — COPD QUESTIONNAIRES
DURING THE PAST 4 WEEKS HOW MUCH DID YOU FEEL SHORT OF BREATH: MOST  OR ALL OF THE TIME
DO YOU EVER COUGH UP ANY MUCUS OR PHLEGM?: YES, A FEW DAYS A WEEK OR MONTH
COPD SCREENING SCORE: 3
HAVE YOU SMOKED AT LEAST 100 CIGARETTES IN YOUR ENTIRE LIFE: NO/DON'T KNOW

## 2019-12-14 ASSESSMENT — COGNITIVE AND FUNCTIONAL STATUS - GENERAL
SUGGESTED CMS G CODE MODIFIER MOBILITY: CJ
MOVING FROM LYING ON BACK TO SITTING ON SIDE OF FLAT BED: A LITTLE
MOBILITY SCORE: 20
STANDING UP FROM CHAIR USING ARMS: A LITTLE
CLIMB 3 TO 5 STEPS WITH RAILING: A LOT

## 2019-12-14 ASSESSMENT — PATIENT HEALTH QUESTIONNAIRE - PHQ9
2. FEELING DOWN, DEPRESSED, IRRITABLE, OR HOPELESS: NOT AT ALL
1. LITTLE INTEREST OR PLEASURE IN DOING THINGS: NOT AT ALL
2. FEELING DOWN, DEPRESSED, IRRITABLE, OR HOPELESS: NOT AT ALL
SUM OF ALL RESPONSES TO PHQ9 QUESTIONS 1 AND 2: 0
1. LITTLE INTEREST OR PLEASURE IN DOING THINGS: NOT AT ALL
SUM OF ALL RESPONSES TO PHQ9 QUESTIONS 1 AND 2: 0

## 2019-12-14 ASSESSMENT — GAIT ASSESSMENTS
GAIT LEVEL OF ASSIST: MINIMAL ASSIST
DEVIATION: BRADYKINETIC;SHUFFLED GAIT;DECREASED HEEL STRIKE;DECREASED TOE OFF;INCREASED BASE OF SUPPORT
DISTANCE (FEET): 35
ASSISTIVE DEVICE: FRONT WHEEL WALKER

## 2019-12-14 NOTE — ED PROVIDER NOTES
ED Provider Note    HPI: Patient is a 29-year-old female who presented to the emergency department the care of her family December 13, 2019 at 5:23 PM with a chief complaint of a syncopal episode.    Patient's mother arrived home and found the patient down on the floor.  The patient had no memory of the event.  The mother did not witness any seizure activity.  Maximal amount of downtime 30 minutes.  The patient not had a fever chills or cough.  No complaints of chest pain shortness of breath or abdominal pain.  She has not had any complaints of headache neck stiffness or photophobia.  No incontinence occurred.  No other somatic    Review of Systems: Positive possible syncopal episode with no memory event negative for headache neck stiffness photophobia seizure activity incontinence fever chills cough chest pain shortness of breath abdominal pain.  Review of systems reviewed with patient, all other systems negative    Past medical/surgical history: Scoliosis multiple personality disorder chronic QT sinus arrhythmia pneumonia asthma psychosis polycystic ovarian syndrome stage I renal insufficiency sinus tachycardia status post ablation 2016 sleep apnea borderline personality atrial fibrillation depression diabetes abdominal pain headache cholecystectomy elevated BMI    Medications: Symbicort prednisone Phenergan Prozac BuSpar Synthroid Lyrica Singulair potassium chloride Geodon trazodone Zantac Spiriva Neurontin albuterol Robaxin    Allergies: Cefdinir tolerates Augmentin ceftriaxone Depakote doxycycline amitriptyline Abilify clindamycin erythromycin Flagyl Flomax Levaquin metformin vancomycin Keflex    Social History: No history of smoking or alcohol use edible marijuana      Physical exam: Constitutional: Somewhat obese female awake alert appears anxious  Vital signs: Temperature 96.8 pulse 76 respirations 18 blood pressure 139/75 pulse oximetry 93%  EYES: PERRL, EOMI, Conjunctivae and sclera normal, eyelids normal  bilaterally.  Neck: Trachea midline. No cervical masses seen or palpated. Normal range of motion, supple. No meningeal signs elicited.  Cardiac: Regular rate and rhythm. S1-S2 present. No S3 or S4 present. No murmurs, rubs, or gallops heard. No edema or varicosities were seen.   Lungs: Clear to auscultation with good aeration throughout. No wheezes, rales, or rhonchi heard. Patient's chest wall moved symmetrically with each respiratory effort. Patient was not making use of accessory muscles of respiration in breathing.  Abdomen: Soft nontender to palpation. No rebound or guarding elicited. No organomegaly identified. No pulsatile abdominal masses identified.   Musculoskeletal:  no  pain with palpitation or movement of muscle, bone or joint , no obvious musculoskeletal deformities identified.  Neurologic: alert and awake answers questions appropriately. Moves all four extremities independently, no gross focal abnormalities identified. Normal strength and motor.  Skin: no rash or lesion seen, no palpable dermatologic lesions identified.  Psychiatric: Appear to be somewhat anxious.  She was not delusional or hallucinating  Medical decision making: EKG obtained on arrival (interpretation) twelve-lead EKG sinus rhythm rate 82.  Morphology P waves cures complexes T waves unremarkable.  No evidence of ST elevation or depression.  R wave progression normal.  Interpreted as an unremarkable EKG for acute findings    Laboratory studies obtained (please see lab sheet for all results) significant findings include normal troponin mild acidosis bicarb 16 which could indicate a seizure.  Blood sugar elevated to 37 normal CBC    Chest x-ray obtained, no acute abnormalities were seen    Patient complained of nausea with a headache and was given Zofran and Tylenol with some improvement    Patient will be admitted by the hospital service for further evaluation.  At this time the cause of her episode is unclear but given the low bicarb  I am worried this could represent a seizure that was not witnessed.  The patient has not had a fever chills.  Her laboratory studies are otherwise reassuring and the patient does not appear to be infected.    Further care and hospital course per attending physician summary    Impression syncope, possible cardiac versus seizure  2) nausea  3.)  Headache

## 2019-12-14 NOTE — PROGRESS NOTES
[facility] Medicine Daily Progress Note    Date of Service  12/14/2019    Chief Complaint  Altered level consciousness    Hospital Course    29 y.o. female presented 12/13/2019 with altered level consciousness.    In the emergency department the patient was mostly nonverbal, information obtained from her grandmother who was the one who found her.  She states whenever she left she was sleeping but just prior to this had been normal.  She states she had some shortness of breath, she does have a history of asthma.  She did use breathing treatments and a home nurse helped with her breathing and it did seem to normalize.  Her grandmother left, was gone around 90 minutes, upon returning home she found her down, unconscious.  She did call 911.  She did attempt to wake up her granddaughter for quite some time and eventually she did wake up but seemed very groggy and confused.  She states she did just finish her antibiotics for her UTI/bronchitis, otherwise has been taking her usual home medications.  She has not been missing any medications nor taking extra of her medications. Suspect polypharmacy related altered mental status/encephalopathy.  Underlying history of psychiatric issues and multiple, recurrent hospitalizations.  Robaxin can cause most of her symptoms, medication stopped.  CT of head suspicious for mastoiditis     Interval Problem Update  This morning the patient reports 8/10 frontal headache, unsteadiness, new blurry vision, persistent dizziness, bilateral lower extremity neuropathy, not feeling well, nausea, constipation and not being ready for discharge home. She denies shortness of breath, chest pain, dysuria. She ambulated to the bathroom with nursing supervision without incident.     Consultants/Specialty  None     Code Status  Full    Disposition  Likely home when symptoms continue to improve    Review of Systems  Review of Systems   Constitutional: Positive for malaise/fatigue. Negative for chills,  diaphoresis, fever and weight loss.   HENT: Negative for congestion, ear discharge, ear pain, hearing loss, nosebleeds, sinus pain, sore throat and tinnitus.    Eyes: Positive for blurred vision, photophobia and pain. Negative for discharge and redness.   Respiratory: Negative for cough, sputum production, shortness of breath and wheezing.    Cardiovascular: Negative for chest pain, palpitations and leg swelling.   Gastrointestinal: Positive for abdominal pain, constipation and nausea. Negative for blood in stool, diarrhea, heartburn, melena and vomiting.   Genitourinary: Negative for dysuria, flank pain, frequency, hematuria and urgency.   Musculoskeletal: Positive for falls and myalgias. Negative for back pain, joint pain and neck pain.   Skin: Negative for itching and rash.   Neurological: Positive for dizziness, tingling, sensory change and headaches. Negative for tremors, speech change, focal weakness, seizures and weakness.   Psychiatric/Behavioral: Positive for memory loss. Negative for depression and substance abuse. The patient is not nervous/anxious.         Physical Exam  Temp:  [36 °C (96.8 °F)-37.1 °C (98.7 °F)] 36.3 °C (97.3 °F)  Pulse:  [71-93] 85  Resp:  [16-25] 18  BP: (120-152)/(58-79) 123/63  SpO2:  [90 %-96 %] 96 %    Physical Exam  Vitals signs and nursing note reviewed.   Constitutional:       General: She is awake. She is in acute distress.      Appearance: She is well-developed. She is morbidly obese. She is not ill-appearing or diaphoretic.   HENT:      Head: Normocephalic and atraumatic.      Right Ear: External ear normal.      Left Ear: External ear normal.      Nose: Nose normal. No congestion or rhinorrhea.      Mouth/Throat:      Mouth: Mucous membranes are moist.      Pharynx: No oropharyngeal exudate.   Eyes:      General: No scleral icterus.        Right eye: No discharge.         Left eye: No discharge.      Extraocular Movements: Extraocular movements intact.       Conjunctiva/sclera: Conjunctivae normal.      Pupils: Pupils are equal, round, and reactive to light.   Neck:      Musculoskeletal: Normal range of motion and neck supple.      Trachea: No tracheal deviation.   Cardiovascular:      Rate and Rhythm: Normal rate and regular rhythm.      Pulses: Normal pulses.      Heart sounds: Normal heart sounds. No murmur. No friction rub. No gallop.    Pulmonary:      Effort: Pulmonary effort is normal. No respiratory distress.      Breath sounds: Normal breath sounds. No stridor. No wheezing or rales.   Chest:      Chest wall: No tenderness.   Abdominal:      General: Bowel sounds are normal. There is no distension.      Palpations: Abdomen is soft.      Tenderness: There is no tenderness. There is no right CVA tenderness, left CVA tenderness or guarding.   Musculoskeletal: Normal range of motion.         General: No tenderness, deformity or signs of injury.      Right lower leg: Edema present.      Left lower leg: Edema present.      Comments: trace   Lymphadenopathy:      Cervical: No cervical adenopathy.   Skin:     General: Skin is warm and dry.      Coloration: Skin is pale. Skin is not jaundiced.      Findings: No erythema or rash.   Neurological:      Mental Status: She is alert and oriented to person, place, and time.      Motor: No weakness.      Coordination: Coordination normal.      Gait: Gait normal.      Comments: alert   Psychiatric:         Attention and Perception: Attention normal.         Mood and Affect: Mood is anxious and depressed.         Speech: Speech is delayed.         Behavior: Behavior is slowed. Behavior is cooperative.         Cognition and Memory: Cognition is impaired. Memory is impaired.         Fluids    Intake/Output Summary (Last 24 hours) at 12/14/2019 1000  Last data filed at 12/13/2019 2155  Gross per 24 hour   Intake 240 ml   Output --   Net 240 ml       Laboratory  Recent Labs     12/13/19  1850 12/14/19  0503   WBC 7.7 7.3   RBC 4.41  4.23   HEMOGLOBIN 13.5 13.2   HEMATOCRIT 41.5 38.5   MCV 94.1 91.0   MCH 30.6 31.2   MCHC 32.5* 34.3   RDW 44.0 42.0   PLATELETCT 174 166   MPV 12.2 11.7     Recent Labs     12/13/19  1850 12/14/19  0503   SODIUM 134* 139   POTASSIUM 5.1 3.9   CHLORIDE 106 106   CO2 16* 23   GLUCOSE 237* 81   BUN 14 14   CREATININE 0.78 0.68   CALCIUM 9.5 9.2                   Imaging  CT-HEAD W/O   Final Result         1.  No acute intracranial abnormality.   2.  Stable partial opacification of left mastoid air cells, consider effusion or component of mastoiditis as clinically appropriate      DX-CHEST-PORTABLE (1 VIEW)   Final Result      No evidence of acute cardiopulmonary process.           Assessment/Plan  * Altered level of consciousness- (present on admission)  Assessment & Plan  -Patient is on multiple medications that could cause this, likely polypharmacy  -She does have a history of accidental overdose  -Family denies this but patient not answering questions in ED.  -continue same dose of home meds other than discontinued Robaxin, monitor for change in symptoms.  -No seizure activity, less likely to be seizure. CPK= 36  -chest x-ray: no acute cardiopulmonary process  -Head CT suspicious for mastoiditis.  Recent upper respiratory infection status post antibiotic course.  -PT/OT evaluation  -Transfer to neurology unit    Morbidly obese (HCC)- (present on admission)  Assessment & Plan  -Needs diet and exercise adjustment    Acquired hypothyroidism- (present on admission)  Assessment & Plan  -Continue home Synthroid at 75 mcg daily  -Most recent TSH was approximately 1 month ago was normal at 3.57    Type 2 diabetes mellitus with hyperglycemia, without long-term current use of insulin ([facility])- (present on admission)  Assessment & Plan  -Hold home med  -Start insulin sliding scale  -Adjust as needed    Depression- (present on admission)  Assessment & Plan  -Continue home Prozac    GERD (gastroesophageal reflux disease)- (present  on admission)  Assessment & Plan  -Continue H2 blocker    Borderline personality disorder in adult (HCC)- (present on admission)  Assessment & Plan  -Continue home meds with no increase       VTE prophylaxis: Lovenox

## 2019-12-14 NOTE — PROGRESS NOTES
Called the pt's mother to see if she could bring in the pt's ivabradine HCI medication when she comes back to the hospital in the morning, per pharmacy's request.  Emmy said that she would be able to do that.

## 2019-12-14 NOTE — ED NOTES
"Pt called questioning, \"Is someone mad at me\"? Pt said she is \"confused\" and thinks maybe staff is upset because she comes to the hospital frequently. Reassured pt, closed door to room.   "

## 2019-12-14 NOTE — ASSESSMENT & PLAN NOTE
12/28-12/29 - given use of concurrent bicarb and ppi...  Patient's entire absorptive pathway starting in her stomach has been completely distorted.  - Previous Plans by Other Providers -  Continue with Pepcid

## 2019-12-14 NOTE — ED TRIAGE NOTES
"Chief Complaint   Patient presents with   • Syncope     found down by mother, states that she last saw her 30min prior.    • Dizziness     /77   Pulse 86   Temp 36 °C (96.8 °F) (Temporal)   Resp 16   Ht 1.651 m (5' 5\")   Wt 122.5 kg (270 lb)   SpO2 94%   BMI 44.93 kg/m²     BIB EMS, PT presents to the ED, she was found down by her mother, face down. Mother states that last saw her in bed 30 min prior to coming home and seeing her on the ground. It took her less then a min to wake her up. Pt states she doesn't remember the event, she was seen here recently for a concussion.     Has nausea.     Denies vomiting.      Charge notified.     "

## 2019-12-14 NOTE — ED NOTES
Pt up to commode in room- very unstable on her feet, leaning on staff to pivot safely. Reported dizziness when standing. Also reported dysuria, said she was diagnosed with UTI- finished last dose of Bactrim last night.   Reports no improvement with headache pain.

## 2019-12-14 NOTE — ASSESSMENT & PLAN NOTE
12/24-12/29 -stable  - Previous Plans by Other Providers -  Continue Insulin-sliding scale, accu-checks and hypoglycemia protocol.  Continue with Consistent Carbohydrate diet   Hold home dose of trulicity   Results from last 7 days   Lab Units 12/29/19  1723 12/29/19  1234 12/29/19  0912 12/28/19  2100 12/28/19  1621 12/28/19  1150 12/28/19  0726 12/27/19  2048   ACCU CHECK GLUCOSE 788 mg/dL 103* 110* 125* 93 111* 143* 114* 84

## 2019-12-14 NOTE — H&P
Hospital Medicine History & Physical Note    Date of Service  12/13/2019    Primary Care Physician  Torres Brody M.D.    Consultants  None    Code Status  Full    Chief Complaint  Altered level consciousness    History of Presenting Illness  29 y.o. female who presented 12/13/2019 with altered level consciousness.  At this point in time, patient is mostly nonverbal, information obtained from her grandmother who was the one who found her.  She states whenever she left she was sleeping but just prior to this had been normal.  She states this morning she had some shortness of breath, she does have a history of asthma.  She did use breathing treatments and a home nurse helped with her breathing and it did seem to normalize.  Her grandmother left, was gone around 90 minutes, upon returning home she found her down, unconscious.  She did call 911.  She did attempt to wake up her granddaughter for quite some time and eventually she did wake up but seemed very groggy and confused.  She states she did just finish her antibiotics for her UTI/bronchitis, otherwise has been taking her usual home medications.  She has not been missing any medications nor taking extra of her medications.  I did discuss the case including labs and imaging with the ER physician.    Review of Systems  Review of Systems   Unable to perform ROS: Acuity of condition       Past Medical History   has a past medical history of Abdominal pain, Anginal syndrome, Apnea, sleep, Arrhythmia, Arthritis, ASTHMA, Atrial fibrillation (HCC), Back pain, Borderline personality disorder ([facility]), Breath shortness, Bronchitis, Cardiac arrhythmia, Chickenpox, Chronic UTI (9/18/2010), Cough, Daytime sleepiness, Depression, Diabetes (HCC), Diarrhea, Disorder of thyroid, Fall, Fatigue, Frequent headaches, Gasping for breath, Gynecological disorder, Headache(784.0), Heart burn, History of falling, Hypertension, Indigestion, Migraine, 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HCC), Multiple  "personality disorder (HCC), Nausea, Obesity, Pain (08-15-12), Painful joint, PCOS (polycystic ovarian syndrome), Pneumonia (2012), Psychosis (HCC), Renal disorder, Ringing in ears, Scoliosis, Shortness of breath, Sinus tachycardia (10/31/2013), Sleep apnea, Snoring, Tonsillitis, Tuberculosis, Urinary bladder disorder, Urinary incontinence, Weakness, and Wears glasses.    Surgical History   has a past surgical history that includes neuro dest facet l/s w/ig sngl (4/21/2015); recovery (1/27/2016); delmar by laparoscopy (8/29/2010); lumbar fusion anterior (8/21/2012); other cardiac surgery (1/2016); tonsillectomy; bowel stimulator insertion (7/13/2016); gastroscopy with balloon dilatation (N/A, 1/4/2017); muscle biopsy (Right, 1/26/2017); other abdominal surgery; and laminotomy.     Family History  family history includes Genitourinary () Problems in her sister; Heart Disease in her maternal grandmother and mother; Hypertension in her maternal grandmother, maternal uncle, and mother; No Known Problems in her sister; Other in her mother and sister; Sleep Apnea in her mother.     Social History   reports that she has never smoked. She has never used smokeless tobacco. She reports current drug use. Frequency: 7.00 times per week. Drugs: Marijuana and Oral. She reports that she does not drink alcohol.    Allergies  Allergies   Allergen Reactions   • Cefdinir Shortness of Breath and Itching     Tolerated 1/18/17  Tolerates ceftriaxone  Tolerated augmentin 8/2019    • Depakote [Divalproex Sodium] Unspecified     Muscle spasms/muscle pain and weakness     • Doxycycline Anaphylaxis and Vomiting     RXN=unknown   • Amitriptyline Unspecified     Headaches     • Aripiprazole [Abilify] Unspecified     Headaches/muscle twitching     • Clindamycin Nausea     Even with food     • Ees [Erythromycin] Vomiting and Nausea   • Flagyl [Metronidazole Hcl] Unspecified     \"eye problems\"     • Flomax [Tamsulosin Hydrochloride] Swelling   • " Levaquin Unspecified     Severe muscle cramps in legs  RXN=unknown   • Metformin Unspecified     Increased lactic acid      • Tape Rash     Tears skin off  coban with Tegaderm tape ok intermittently  RXN=ongoing   • Vancomycin Itching     Pt becomes flushed in face and chest.   RXN=7/10/16   • Wound Dressing Adhesive Hives     By pt report   • Cephalexin [Keflex] Rash     Pt states she gets a rash with this medication  Tolerates ceftriaxone       Medications  Prior to Admission Medications   Prescriptions Last Dose Informant Patient Reported? Taking?   Diclofenac Sodium (VOLTAREN) 1 % Gel  Patient Yes No   Sig: Apply 1 Application to skin as directed 4 times a day as needed (on wrists, back, ankles, and feet).   Dulaglutide (TRULICITY) 1.5 MG/0.5ML Solution Pen-injector  Patient Yes No   Sig: Inject 1.5 mg as instructed every Friday.   Ivabradine HCl (CORLANOR) 7.5 MG Tab  Patient Yes No   Sig: Take 7.5 mg by mouth 2 Times a Day.   Melatonin 5 MG Tab  Patient Yes No   Sig: Take 10 mg by mouth every evening.   albuterol 108 (90 Base) MCG/ACT Aero Soln inhalation aerosol  Patient Yes No   Sig: Inhale 2 Puffs by mouth every 6 hours as needed for Shortness of Breath.   aspirin EC (ECOTRIN) 81 MG Tablet Delayed Response  Patient Yes No   Sig: Take 81 mg by mouth every day.   budesonide-formoterol (SYMBICORT) 160-4.5 MCG/ACT Aerosol  Patient Yes No   Sig: Inhale 2 Puffs by mouth 2 Times a Day.   busPIRone (BUSPAR) 10 MG Tab tablet  Patient Yes No   Sig: Take 10 mg by mouth 2 times a day.   diphenhydrAMINE-APAP, sleep, (TYLENOL PM EXTRA STRENGTH)  MG Tab  Patient Yes No   Sig: Take 2 Tabs by mouth every evening.   etonogestrel (NEXPLANON) 68 MG Implant implant  Patient Yes No   Sig: Inject 1 Each as instructed Once.   fluoxetine (PROZAC) 40 MG capsule  Patient Yes No   Sig: Take 40 mg by mouth every day.   folic acid (FOLVITE) 800 MCG tablet  Patient Yes No   Sig: Take 800 mg by mouth every day.   furosemide (LASIX)  80 MG Tab  Patient Yes No   Sig: Take 80 mg by mouth every day.   gabapentin (NEURONTIN) 300 MG Cap  Patient Yes No   Sig: Take 300 mg by mouth 4 times a day.   ipratropium-albuterol (DUONEB) 0.5-2.5 (3) MG/3ML nebulizer solution  Patient No No   Sig: 3 mL by Nebulization route every 6 hours as needed for Shortness of Breath.   levothyroxine (SYNTHROID) 75 MCG Tab  Patient Yes No   Sig: Take 75 mcg by mouth Every morning on an empty stomach.   methocarbamol (ROBAXIN) 750 MG Tab  Patient Yes No   Sig: Take 750 mg by mouth 3 times a day.   montelukast (SINGULAIR) 10 MG Tab  Patient Yes No   Sig: Take 10 mg by mouth every day.   mycophenolate (CELLCEPT) 500 MG tablet  Patient Yes No   Sig: Take 1,000 mg by mouth 2 times a day.   ondansetron (ZOFRAN ODT) 4 MG TABLET DISPERSIBLE  Patient Yes No   Sig: Take 4 mg by mouth every 6 hours as needed for Nausea.   potassium chloride SA (KDUR) 20 MEQ Tab CR  Patient Yes No   Sig: Take 20 mEq by mouth 2 times a day.   predniSONE (DELTASONE) 20 MG Tab   No No   Sig: Take 3 Tabs by mouth every day for 5 days.   pregabalin (LYRICA) 300 MG capsule  Patient Yes No   Sig: Take 300 mg by mouth 2 Times a Day.   promethazine (PHENERGAN) 25 MG Suppos  Patient No No   Sig: Insert 1 Suppository in rectum every 8 hours as needed for Nausea/Vomiting.   ranitidine (ZANTAC) 300 MG tablet  Patient Yes No   Sig: Take 300 mg by mouth every morning.   sodium bicarbonate 325 MG Tab  Patient Yes No   Sig: Take 650 mg by mouth 2 Times a Day.   tiotropium (SPIRIVA) 18 MCG Cap  Patient No No   Sig: Inhale 1 Cap by mouth every day.   traZODone (DESYREL) 100 MG Tab  Patient Yes No   Sig: Take 100 mg by mouth every evening.   ziprasidone (GEODON) 80 MG Cap  Patient Yes No   Sig: Take 80 mg by mouth 2 Times a Day.      Facility-Administered Medications: None       Physical Exam  Temp:  [36 °C (96.8 °F)] 36 °C (96.8 °F)  Pulse:  [72-93] 80  Resp:  [16-25] 20  BP: (133-152)/(75-79) 152/79  SpO2:  [90 %-94 %]  92 %    Physical Exam  Vitals signs and nursing note reviewed.   Constitutional:       General: She is not in acute distress.     Appearance: She is well-developed. She is obese. She is not diaphoretic.   HENT:      Head: Normocephalic and atraumatic.      Right Ear: External ear normal.      Left Ear: External ear normal.      Nose: Nose normal. No congestion or rhinorrhea.      Mouth/Throat:      Mouth: Mucous membranes are moist.      Pharynx: No oropharyngeal exudate.   Eyes:      General: No scleral icterus.        Right eye: No discharge.         Left eye: No discharge.      Extraocular Movements: Extraocular movements intact.   Neck:      Musculoskeletal: Neck supple.      Trachea: No tracheal deviation.   Cardiovascular:      Rate and Rhythm: Normal rate and regular rhythm.      Heart sounds: No murmur. No friction rub. No gallop.    Pulmonary:      Effort: Pulmonary effort is normal. No respiratory distress.      Breath sounds: Normal breath sounds. No stridor. No wheezing or rales.   Abdominal:      General: Bowel sounds are normal. There is no distension.      Palpations: Abdomen is soft.   Musculoskeletal: Normal range of motion.         General: No tenderness.      Right lower leg: Edema present.      Left lower leg: Edema present.   Lymphadenopathy:      Cervical: No cervical adenopathy.   Skin:     General: Skin is warm and dry.      Coloration: Skin is jaundiced.      Findings: No erythema or rash.   Neurological:      Comments: Somnolent, arousable but not very verbal    Psychiatric:         Speech: Speech is delayed.         Behavior: Behavior is slowed.         Cognition and Memory: Cognition is impaired. Memory is impaired.         Laboratory:  Recent Labs     12/13/19  1850   WBC 7.7   RBC 4.41   HEMOGLOBIN 13.5   HEMATOCRIT 41.5   MCV 94.1   MCH 30.6   MCHC 32.5*   RDW 44.0   PLATELETCT 174   MPV 12.2     Recent Labs     12/13/19  1850   SODIUM 134*   POTASSIUM 5.1   CHLORIDE 106   CO2 16*    GLUCOSE 237*   BUN 14   CREATININE 0.78   CALCIUM 9.5     Recent Labs     12/13/19  1850   ALTSGPT 28   ASTSGOT 29   ALKPHOSPHAT 52   TBILIRUBIN 0.4   GLUCOSE 237*         No results for input(s): NTPROBNP in the last 72 hours.      Recent Labs     12/13/19  1850   TROPONINT <6       Urinalysis:    No results found     Imaging:  DX-CHEST-PORTABLE (1 VIEW)   Final Result      No evidence of acute cardiopulmonary process.      CT-HEAD W/O    (Results Pending)         Assessment/Plan:  I anticipate this patient is appropriate for observation status at this time.    * Altered level of consciousness- (present on admission)  Assessment & Plan  -Patient is on multiple medications that could cause this  -She does have a history of accidental overdose  -Family denies this but patient currently is not answering questions  -I will continue her home meds with no increase, mainly continuing her home meds to see if there is any worsening  -No seizure activity, less likely to be seizure but monitor and obtain a CPK  -I did personally review her chest x-ray, noted no acute cardiopulmonary process    Morbidly obese (HCC)- (present on admission)  Assessment & Plan  -Needs diet and exercise adjustment    Acquired hypothyroidism- (present on admission)  Assessment & Plan  -Continue home Synthroid at 75 mcg daily  -Most recent TSH was approximately 1 month ago was normal at 3.57    Type 2 diabetes mellitus with hyperglycemia, without long-term current use of insulin ([facility])- (present on admission)  Assessment & Plan  -Hold home med  -Start insulin sliding scale  -Adjust as needed    Depression- (present on admission)  Assessment & Plan  -Continue home Prozac    GERD (gastroesophageal reflux disease)- (present on admission)  Assessment & Plan  -Continue H2 blocker    Borderline personality disorder in adult (HCC)- (present on admission)  Assessment & Plan  -Continue home meds with no increase      VTE prophylaxis: SCDs

## 2019-12-14 NOTE — PROGRESS NOTES
I have personally seen and examined / evaluated the patient, discussed the patient's evaluation & management plan with EDD Sanon and I have reviewed the note below.     I agree with the findings, history, examination and assessment / plan as listed below with changes/addendum as listed below by me in my addendum / attestation separetely.     Suspect polypharmacy related altered mental status/encephalopathy.  Patient reports persistent dizziness, not feeling well and not being ready for discharge home.  Underlying multiple, recurrent hospitalizations.  Underlying history of psychiatric issues.  Concern for polypharmacy/altered mental status, not an ideal candidate for ongoing CDU hospitalization/stay.  Recommend transfer to neurology regular nursing floor, PT/OT evaluations.Recommend stopping Robaxin as can cause most of her symptoms.  Concern for mastoiditis, continue Augmentin, outpatient ENT follow-up.  No other infectious concerns apparent, laboratory/imaging parameters stable.     Discussed with bed control - transfer to neurology RNF       Earl Meehan M.D.  12/14/19  8:08 AM

## 2019-12-14 NOTE — ASSESSMENT & PLAN NOTE
"12/29- Patient peaked at 126kg during admission, down to 121.5 since discontinuing her Dr. Pepper and letting the lasix do what it does... patient lost 10 pounds while laying in bed doing absolutely nothing except urinating since plan started 2 days ago. Obesity may have a component of profound fluid overload from Dr. Pepper intake...  12/28 - Stable.  12/27 - Continued discussion about what we had talked about.  Further discussion about how the patient's prescribing cascade is also playing into this.  She hates that she has 5-6 different kinds of doctors and spends all of her time at doctor appointments.  Interested in discussion about beginning to taper medications in the outpatient and that it would require finding a physician willing to start this process.  Discussion that it could take 18 months in a clinic to help give her her life back  12/26 - Patient consumes a lot of soda.  Possibly up to a gallon/day of total fluid intake.  Known to have consumed at least 80oz of diet dr. Greco in a single day while lying in bed with shaky legs.  Symptoms that will get worse as she weakens and further debilitates while laying in bed.  Patient already on lasix, discussion with patient regarding fluid volume intake, she fully agrees that she feels like she's bogged down by water instead of something similar to the behavior or butter. While she does have a bit extra fluff, likely from the sugar or artificial sweateners in the diet sodas, her \"morbid obesity\" is multi-factorial and won't be fixed with \"diet and exercise.\"  Patient is already on lasix, discussed with her to limit her fluid intake for a couple days here and see what happens.    Body mass index is 46.26 kg/m².    .  "

## 2019-12-14 NOTE — PROGRESS NOTES
Assumed patient care, walk patient to bathroom using a walker. Stand by assist. Encourage to use bathroom , increase mobility.

## 2019-12-14 NOTE — ASSESSMENT & PLAN NOTE
"Highly suspect this is secondary to polypharmacy, patient is on a lot of sedating medications. She does have a history of accidental overdose in the past but denies this. No seizure activity noted, no evidence of rhabdomyolysis. Currently mentation has resolved, is AAOx4 past 2 days. He had a long discussion in terms of decreasing her sedating medications.  12/17. No changes in her pain regimen and sedatives. Follow up to [facility].  12/18. Discussed with Dr. Sabillon. Not very clear diagnoses but managed for functional transverse myelitis, possible optic neuritis, seen by Dr. Newton. Spine stimulator limits further evaluation for other causes of enceiphalopathy/weakness/falls such as MRIs. Taper of gabapentin and transition to Trileptal. Modenalfil also started. Monitor her symptoms of weakness causing her to fall  12/19. May have a functional component but Dr. Chowdary feels there could be neurologic disease. He recommended formal neurology consult to r/o transverse myelitis or CNS lesions and see if it is acceptable to get a CT myelogram since MRI is contraindicated due to her gastric pacer/stimulator, Discussed with Neurology. Has had CT myelogram in the past per their records and seen Dr. Fox. Has been nondiagnostic. Currently no other possible myelopathy diagnosis. Symptomatology also inconsistent as she has reflexes. She has been worked up for myopathies; on Dr. Fox's notes in the past, muscle biopsy was nondiagnostic.  In the past IV Solumedrol was given and per Dr. Fox, shpowed some benefit for a diagnosis of \"paralysis\" and \"lower motor neuron etiology\". 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 This was 2 years ago however and recently she has been experiencing weakness and falls again. Currently agree that no need for CT myelogram right now, will evaluate if high dose steroids will be " of any benefit otherwise she will have to follow-up with Dr. Fox. Ordered 3d course of Solu-medrol to see if there is any benefit, as Neurology recommended no harm to try what worked in the past.  12/20. Discussed plan with grandmother and patient. Will do the steroid trial. Poor IV access so switch to prednisone 60x 1 followe dby pred 10 as per Dr. Chowdary's outpatient regimen for optic neuritis NOS. 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 Inpatient Neurology has no further comment and recommnd following Dr. Fox, neurology for further management. Meanwhile grandmother admits she cannot care for her 24/7 so she is awaiting SNF/rehab/ SW working on it.  12/22. Ordered supplements. Dietary and Speech consults  12/23. Consulted Dr. Ramires, neurology for second opinion. 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 She has a history of back surgery, obesity and she feels she needs anesthesia (does not want bedside LP) therefore ordered head CT followed by IR guided LP. Ordered encephalitis panel. Left message to Dr. Chowdary who isn't in the office today/vacation.  12/24 - Lumbar puncture today.  Patient's mentation is quite improved.  States she needs to have her grandmother be her caregiver.  Her grandmother is struggling to take care of her grandfather with dementia.  Patient wants to go to a rehab/nursing facility.  Agree with potential for this to be polypharmacy.  Neuro pending  12/25 - LP complete - Opening pressures of 25, neuro started acetazolamide. Symptomatically, ALOC is considered resolved.  12/26-12/29 - Resolved, although given patient's extent of polypharmacy from appropriate use of prescribed medications (prescribing cascade) I'm not even sure she knows what an unaltered level of consciousness would be like.

## 2019-12-14 NOTE — ASSESSMENT & PLAN NOTE
12/29 - decreasing geodon to 40mg bid.  Patient struggles to even remain awake/wake-up in the morning after her 0600 dose of geodon.  12/24-12/28 - stable  - Previous Plans by Other Providers -  Continue with home dose of Prozac, Geodon and trazodone

## 2019-12-14 NOTE — PROGRESS NOTES
Did walk again using a walker, working with PT this morning. Update plan of care for floor transfer.

## 2019-12-14 NOTE — ASSESSMENT & PLAN NOTE
12/24-12/29 - stable  - Previous Plans by Other Providers -  Resume home dose of Synthroid  Last month her TSH was within normal limits

## 2019-12-14 NOTE — PROGRESS NOTES
Pt brought up to the CDU from the Red pod of the ED via gurney by an ACLS RN with a monitor.  Report received.  Patient A & O  X 4.  No apparent signs of distress.  Safety precautions in place.  Patient educated to call for assistance.  Will continue to monitor.

## 2019-12-14 NOTE — PROGRESS NOTES
Pt transferred from CDU, [facility], resting comfortably in bed.     2 RN skin check with Arjun RN    Generalized healing scabs on abdomen with generalized bruising from 'cat scratches'.     Two healing scabs on L breast again from 'cat scratches'.     Skin otherwise intact, sacrum, heels, elbows pink and blanching.

## 2019-12-14 NOTE — CARE PLAN
Problem: Safety  Goal: Will remain free from falls  Outcome: MET     Problem: Knowledge Deficit  Goal: Knowledge of disease process/condition, treatment plan, diagnostic tests, and medications will improve  Outcome: MET

## 2019-12-15 LAB
GLUCOSE BLD-MCNC: 105 MG/DL (ref 65–99)
GLUCOSE BLD-MCNC: 108 MG/DL (ref 65–99)
GLUCOSE BLD-MCNC: 85 MG/DL (ref 65–99)
GLUCOSE BLD-MCNC: 91 MG/DL (ref 65–99)

## 2019-12-15 PROCEDURE — A9270 NON-COVERED ITEM OR SERVICE: HCPCS | Performed by: INTERNAL MEDICINE

## 2019-12-15 PROCEDURE — 700102 HCHG RX REV CODE 250 W/ 637 OVERRIDE(OP): Performed by: INTERNAL MEDICINE

## 2019-12-15 PROCEDURE — 700105 HCHG RX REV CODE 258: Performed by: HOSPITALIST

## 2019-12-15 PROCEDURE — 700102 HCHG RX REV CODE 250 W/ 637 OVERRIDE(OP): Performed by: HOSPITALIST

## 2019-12-15 PROCEDURE — 94640 AIRWAY INHALATION TREATMENT: CPT

## 2019-12-15 PROCEDURE — 94760 N-INVAS EAR/PLS OXIMETRY 1: CPT

## 2019-12-15 PROCEDURE — 770006 HCHG ROOM/CARE - MED/SURG/GYN SEMI*

## 2019-12-15 PROCEDURE — 700111 HCHG RX REV CODE 636 W/ 250 OVERRIDE (IP): Performed by: NURSE PRACTITIONER

## 2019-12-15 PROCEDURE — A9270 NON-COVERED ITEM OR SERVICE: HCPCS | Performed by: HOSPITALIST

## 2019-12-15 PROCEDURE — 700111 HCHG RX REV CODE 636 W/ 250 OVERRIDE (IP): Performed by: INTERNAL MEDICINE

## 2019-12-15 PROCEDURE — 99233 SBSQ HOSP IP/OBS HIGH 50: CPT | Performed by: HOSPITALIST

## 2019-12-15 PROCEDURE — 97166 OT EVAL MOD COMPLEX 45 MIN: CPT

## 2019-12-15 PROCEDURE — 82962 GLUCOSE BLOOD TEST: CPT

## 2019-12-15 PROCEDURE — 700101 HCHG RX REV CODE 250: Performed by: INTERNAL MEDICINE

## 2019-12-15 PROCEDURE — 96372 THER/PROPH/DIAG INJ SC/IM: CPT

## 2019-12-15 PROCEDURE — 700111 HCHG RX REV CODE 636 W/ 250 OVERRIDE (IP): Performed by: HOSPITALIST

## 2019-12-15 PROCEDURE — 96376 TX/PRO/DX INJ SAME DRUG ADON: CPT

## 2019-12-15 PROCEDURE — 96375 TX/PRO/DX INJ NEW DRUG ADDON: CPT

## 2019-12-15 RX ORDER — KETOROLAC TROMETHAMINE 30 MG/ML
15 INJECTION, SOLUTION INTRAMUSCULAR; INTRAVENOUS EVERY 6 HOURS PRN
Status: DISCONTINUED | OUTPATIENT
Start: 2019-12-15 | End: 2019-12-17

## 2019-12-15 RX ORDER — PREGABALIN 100 MG/1
300 CAPSULE ORAL 2 TIMES DAILY
Status: DISCONTINUED | OUTPATIENT
Start: 2019-12-15 | End: 2019-12-28

## 2019-12-15 RX ORDER — MECLIZINE HYDROCHLORIDE 25 MG/1
12.5 TABLET ORAL 3 TIMES DAILY PRN
Status: DISCONTINUED | OUTPATIENT
Start: 2019-12-15 | End: 2019-12-31 | Stop reason: HOSPADM

## 2019-12-15 RX ORDER — SODIUM CHLORIDE, SODIUM LACTATE, POTASSIUM CHLORIDE, CALCIUM CHLORIDE 600; 310; 30; 20 MG/100ML; MG/100ML; MG/100ML; MG/100ML
1000 INJECTION, SOLUTION INTRAVENOUS CONTINUOUS
Status: DISCONTINUED | OUTPATIENT
Start: 2019-12-15 | End: 2019-12-17

## 2019-12-15 RX ADMIN — ONDANSETRON 4 MG: 2 INJECTION INTRAMUSCULAR; INTRAVENOUS at 01:34

## 2019-12-15 RX ADMIN — BUSPIRONE HYDROCHLORIDE 10 MG: 10 TABLET ORAL at 17:15

## 2019-12-15 RX ADMIN — DIPHENHYDRAMINE HYDROCHLORIDE 25 MG: 25 TABLET ORAL at 20:32

## 2019-12-15 RX ADMIN — MYCOPHENOLATE MOFETIL 1000 MG: 250 CAPSULE ORAL at 20:32

## 2019-12-15 RX ADMIN — SODIUM BICARBONATE 650 MG: 650 TABLET ORAL at 05:45

## 2019-12-15 RX ADMIN — KETOROLAC TROMETHAMINE 15 MG: 30 INJECTION, SOLUTION INTRAMUSCULAR at 13:52

## 2019-12-15 RX ADMIN — SODIUM BICARBONATE 650 MG: 650 TABLET ORAL at 17:15

## 2019-12-15 RX ADMIN — ONDANSETRON 4 MG: 2 INJECTION INTRAMUSCULAR; INTRAVENOUS at 08:11

## 2019-12-15 RX ADMIN — TRAZODONE HYDROCHLORIDE 100 MG: 100 TABLET ORAL at 17:15

## 2019-12-15 RX ADMIN — POTASSIUM CHLORIDE 20 MEQ: 20 TABLET, EXTENDED RELEASE ORAL at 18:00

## 2019-12-15 RX ADMIN — BUDESONIDE AND FORMOTEROL FUMARATE DIHYDRATE 2 PUFF: 160; 4.5 AEROSOL RESPIRATORY (INHALATION) at 05:44

## 2019-12-15 RX ADMIN — BUDESONIDE AND FORMOTEROL FUMARATE DIHYDRATE 2 PUFF: 160; 4.5 AEROSOL RESPIRATORY (INHALATION) at 17:26

## 2019-12-15 RX ADMIN — ONDANSETRON 4 MG: 2 INJECTION INTRAMUSCULAR; INTRAVENOUS at 17:26

## 2019-12-15 RX ADMIN — PREGABALIN 300 MG: 100 CAPSULE ORAL at 17:15

## 2019-12-15 RX ADMIN — PREGABALIN 300 MG: 100 CAPSULE ORAL at 05:52

## 2019-12-15 RX ADMIN — SODIUM CHLORIDE, POTASSIUM CHLORIDE, SODIUM LACTATE AND CALCIUM CHLORIDE 1000 ML: 600; 310; 30; 20 INJECTION, SOLUTION INTRAVENOUS at 20:36

## 2019-12-15 RX ADMIN — FLUOXETINE HYDROCHLORIDE 40 MG: 20 CAPSULE ORAL at 05:44

## 2019-12-15 RX ADMIN — ASPIRIN 81 MG: 81 TABLET, COATED ORAL at 05:44

## 2019-12-15 RX ADMIN — SODIUM CHLORIDE, POTASSIUM CHLORIDE, SODIUM LACTATE AND CALCIUM CHLORIDE 1000 ML: 600; 310; 30; 20 INJECTION, SOLUTION INTRAVENOUS at 13:52

## 2019-12-15 RX ADMIN — KETOROLAC TROMETHAMINE 15 MG: 30 INJECTION, SOLUTION INTRAMUSCULAR at 20:33

## 2019-12-15 RX ADMIN — FAMOTIDINE 20 MG: 20 TABLET ORAL at 05:44

## 2019-12-15 RX ADMIN — BUTALBITAL, ACETAMINOPHEN, AND CAFFEINE 1 TABLET: 50; 325; 40 TABLET ORAL at 20:32

## 2019-12-15 RX ADMIN — AMOXICILLIN AND CLAVULANATE POTASSIUM 1 TABLET: 875; 125 TABLET, FILM COATED ORAL at 17:15

## 2019-12-15 RX ADMIN — POTASSIUM CHLORIDE 20 MEQ: 20 TABLET, EXTENDED RELEASE ORAL at 05:45

## 2019-12-15 RX ADMIN — ALBUTEROL SULFATE 2.5 MG: 2.5 SOLUTION RESPIRATORY (INHALATION) at 01:49

## 2019-12-15 RX ADMIN — ALBUTEROL SULFATE 2.5 MG: 2.5 SOLUTION RESPIRATORY (INHALATION) at 18:50

## 2019-12-15 RX ADMIN — MYCOPHENOLATE MOFETIL 1000 MG: 250 CAPSULE ORAL at 05:45

## 2019-12-15 RX ADMIN — BUTALBITAL, ACETAMINOPHEN, AND CAFFEINE 1 TABLET: 50; 325; 40 TABLET ORAL at 13:09

## 2019-12-15 RX ADMIN — ALBUTEROL SULFATE 2 PUFF: 90 AEROSOL, METERED RESPIRATORY (INHALATION) at 20:34

## 2019-12-15 RX ADMIN — GABAPENTIN 300 MG: 300 CAPSULE ORAL at 13:04

## 2019-12-15 RX ADMIN — BUSPIRONE HYDROCHLORIDE 10 MG: 10 TABLET ORAL at 05:45

## 2019-12-15 RX ADMIN — ZIPRASIDONE HYDROCHLORIDE 80 MG: 80 CAPSULE ORAL at 17:26

## 2019-12-15 RX ADMIN — AMOXICILLIN AND CLAVULANATE POTASSIUM 1 TABLET: 875; 125 TABLET, FILM COATED ORAL at 05:44

## 2019-12-15 RX ADMIN — ENOXAPARIN SODIUM 40 MG: 100 INJECTION SUBCUTANEOUS at 05:44

## 2019-12-15 RX ADMIN — TIOTROPIUM BROMIDE 1 CAPSULE: 18 CAPSULE ORAL; RESPIRATORY (INHALATION) at 05:44

## 2019-12-15 RX ADMIN — GABAPENTIN 300 MG: 300 CAPSULE ORAL at 09:30

## 2019-12-15 RX ADMIN — MECLIZINE HYDROCHLORIDE 12.5 MG: 25 TABLET ORAL at 17:15

## 2019-12-15 RX ADMIN — BUTALBITAL, ACETAMINOPHEN, AND CAFFEINE 1 TABLET: 50; 325; 40 TABLET ORAL at 05:44

## 2019-12-15 RX ADMIN — FUROSEMIDE 80 MG: 40 TABLET ORAL at 05:45

## 2019-12-15 RX ADMIN — ZIPRASIDONE HYDROCHLORIDE 80 MG: 80 CAPSULE ORAL at 05:44

## 2019-12-15 RX ADMIN — SENNOSIDES AND DOCUSATE SODIUM 2 TABLET: 8.6; 5 TABLET ORAL at 05:43

## 2019-12-15 RX ADMIN — SENNOSIDES AND DOCUSATE SODIUM 2 TABLET: 8.6; 5 TABLET ORAL at 17:15

## 2019-12-15 RX ADMIN — GABAPENTIN 300 MG: 300 CAPSULE ORAL at 20:32

## 2019-12-15 RX ADMIN — GABAPENTIN 300 MG: 300 CAPSULE ORAL at 17:16

## 2019-12-15 RX ADMIN — LEVOTHYROXINE SODIUM 75 MCG: 75 TABLET ORAL at 05:43

## 2019-12-15 ASSESSMENT — ENCOUNTER SYMPTOMS
DIZZINESS: 0
BLOOD IN STOOL: 0
SHORTNESS OF BREATH: 0
CONSTIPATION: 0
CLAUDICATION: 0
TINGLING: 0
HEMOPTYSIS: 0
ORTHOPNEA: 0
FEVER: 0
MEMORY LOSS: 0
HEADACHES: 0
MYALGIAS: 1
EYE PAIN: 0
BACK PAIN: 0
NERVOUS/ANXIOUS: 1
STRIDOR: 0
VOMITING: 0
TREMORS: 0
SPEECH CHANGE: 0
ABDOMINAL PAIN: 1
PALPITATIONS: 0
PND: 0
WEAKNESS: 1
SORE THROAT: 0
SPUTUM PRODUCTION: 0
BLURRED VISION: 0
COUGH: 0
DIARRHEA: 0
PHOTOPHOBIA: 0
NECK PAIN: 0
SENSORY CHANGE: 0
HEARTBURN: 1
CHILLS: 0
DOUBLE VISION: 0
DEPRESSION: 0
NAUSEA: 1

## 2019-12-15 ASSESSMENT — COGNITIVE AND FUNCTIONAL STATUS - GENERAL
TOILETING: A LITTLE
DAILY ACTIVITIY SCORE: 18
HELP NEEDED FOR BATHING: A LITTLE
DRESSING REGULAR UPPER BODY CLOTHING: A LITTLE
DRESSING REGULAR LOWER BODY CLOTHING: A LOT
SUGGESTED CMS G CODE MODIFIER DAILY ACTIVITY: CK
PERSONAL GROOMING: A LITTLE

## 2019-12-15 ASSESSMENT — ACTIVITIES OF DAILY LIVING (ADL): TOILETING: REQUIRES ASSIST

## 2019-12-15 NOTE — DIETARY
NUTRITION SERVICES: BMI - Pt with BMI >40 (=Body mass index is 46.26 kg/m².), morbid obesity. Weight loss counseling not appropriate in acute care setting. Wounds/ulcer to left breast and abd noted to be healed per doc flow sheet. Weight loss reported on admission. Malnutrition Screening Tool score <2. No poor PO  Noted per admit screen and pt eating % of last two documented meals per ADL.  Nutrition assessment not indicated at this time.     RECOMMEND - Referral to outpatient nutrition services for weight management after D/C.  Consult wound care if needed. RD will monitor per department guidelines. Please consult RD for nutrition concerns, RD available PRN.

## 2019-12-15 NOTE — PROGRESS NOTES
Assumed care of patient at 0700.    Received report from night RN.    Pt alert and oriented x 4, has complaints of dizziness, lightheadedness, and nasuea. Nausea medication given per order.   Complained of heart palpitations/discomfort states its from not taking the medication Corlanor for a few days now.  Discussed with Dr. Hendrix and he ordered her that medication.  Gave pt the medication per order and pt feels better now.  Pt resting comfortably in bed.   Bed alarm on.  Hourly rounding implemented.

## 2019-12-15 NOTE — PROGRESS NOTES
Hospital Medicine Daily Progress Note    Date of Service  12/15/2019    Chief Complaint  29 y.o. female admitted 12/13/2019 with AMS    Hospital Course    per HPI  29 y.o. female who presented 12/13/2019 with altered level consciousness.  At this point in time, patient is mostly nonverbal, information obtained from her grandmother who was the one who found her.  She states whenever she left she was sleeping but just prior to this had been normal.  She states this morning she had some shortness of breath, she does have a history of asthma.  She did use breathing treatments and a home nurse helped with her breathing and it did seem to normalize.  Her grandmother left, was gone around 90 minutes, upon returning home she found her down, unconscious.  She did call 911.  She did attempt to wake up her granddaughter for quite some time and eventually she did wake up but seemed very groggy and confused.  She states she did just finish her antibiotics for her UTI/bronchitis, otherwise has been taking her usual home medications.  She has not been missing any medications nor taking extra of her medications.  I did discuss the case including labs and imaging with the ER physician.             Interval Problem Update  Patient is complaining of having dizziness, she feels dehydrated and just overall not feeling well.  Denies any fevers or chills.  Is complaining of lower extremity spasms as well.    Consultants/Specialty  None    Code Status  Full code    Disposition  TBD    Review of Systems  Review of Systems   Constitutional: Positive for malaise/fatigue. Negative for chills and fever.   HENT: Negative for congestion, hearing loss, sore throat and tinnitus.    Eyes: Negative for blurred vision, double vision, photophobia and pain.   Respiratory: Negative for cough, hemoptysis, sputum production, shortness of breath and stridor.    Cardiovascular: Negative for chest pain, palpitations, orthopnea, claudication and PND.    Gastrointestinal: Positive for abdominal pain, heartburn and nausea. Negative for blood in stool, constipation, diarrhea, melena and vomiting.   Genitourinary: Negative for dysuria, frequency and urgency.   Musculoskeletal: Positive for myalgias. Negative for back pain and neck pain.   Neurological: Positive for weakness. Negative for dizziness, tingling, tremors, sensory change, speech change and headaches.   Psychiatric/Behavioral: Negative for depression, memory loss and suicidal ideas. The patient is nervous/anxious.    All other systems reviewed and are negative.       Physical Exam  Temp:  [36.1 °C (97 °F)-36.7 °C (98.1 °F)] 36.4 °C (97.6 °F)  Pulse:  [] 114  Resp:  [16-20] 16  BP: (121-137)/(70-78) 121/70  SpO2:  [94 %-96 %] 94 %    Physical Exam  Vitals signs and nursing note reviewed.   Constitutional:       General: She is not in acute distress.     Appearance: Normal appearance. She is obese. She is not ill-appearing.   HENT:      Head: Normocephalic and atraumatic.      Nose: No congestion.      Mouth/Throat:      Mouth: Mucous membranes are dry.      Pharynx: Oropharynx is clear. No oropharyngeal exudate or posterior oropharyngeal erythema.   Eyes:      Extraocular Movements: Extraocular movements intact.      Conjunctiva/sclera: Conjunctivae normal.      Pupils: Pupils are equal, round, and reactive to light.   Neck:      Musculoskeletal: Normal range of motion and neck supple. No neck rigidity.   Cardiovascular:      Rate and Rhythm: Normal rate.      Pulses: Normal pulses.      Heart sounds: No murmur.   Pulmonary:      Effort: Pulmonary effort is normal. No respiratory distress.      Breath sounds: Normal breath sounds. No wheezing or rales.   Abdominal:      General: Abdomen is flat. Bowel sounds are normal. There is no distension.      Palpations: Abdomen is soft.      Tenderness: There is no tenderness. There is no guarding.   Musculoskeletal: Normal range of motion.         General: No  swelling or tenderness.   Skin:     General: Skin is warm and dry.      Capillary Refill: Capillary refill takes less than 2 seconds.      Coloration: Skin is not pale.   Neurological:      General: No focal deficit present.      Mental Status: She is alert and oriented to person, place, and time. Mental status is at baseline.      Cranial Nerves: No cranial nerve deficit.   Psychiatric:      Comments: anxious         Fluids  No intake or output data in the 24 hours ending 12/15/19 1117    Laboratory  Recent Labs     12/13/19  1850 12/14/19  0503   WBC 7.7 7.3   RBC 4.41 4.23   HEMOGLOBIN 13.5 13.2   HEMATOCRIT 41.5 38.5   MCV 94.1 91.0   MCH 30.6 31.2   MCHC 32.5* 34.3   RDW 44.0 42.0   PLATELETCT 174 166   MPV 12.2 11.7     Recent Labs     12/13/19  1850 12/14/19  0503   SODIUM 134* 139   POTASSIUM 5.1 3.9   CHLORIDE 106 106   CO2 16* 23   GLUCOSE 237* 81   BUN 14 14   CREATININE 0.78 0.68   CALCIUM 9.5 9.2                   Imaging  CT-HEAD W/O   Final Result         1.  No acute intracranial abnormality.   2.  Stable partial opacification of left mastoid air cells, consider effusion or component of mastoiditis as clinically appropriate      DX-CHEST-PORTABLE (1 VIEW)   Final Result      No evidence of acute cardiopulmonary process.           Assessment/Plan  * Altered level of consciousness- (present on admission)  Assessment & Plan  Highly suspect this is secondary to polypharmacy, patient is on a lot of sedating medications  She does have a history of accidental overdose in the past but denies this  No seizure activity noted, no evidence of rhabdomyolysis  Currently mentation has resolved, she is alert oriented x4  He had a long discussion in terms of decreasing her sedating medications.  Continue with neuro checks    Morbidly obese (HCC)- (present on admission)  Assessment & Plan  Body mass index is 46.26 kg/m².  Encourage healthy lifestyle and physical activity.    Acquired hypothyroidism- (present on  admission)  Assessment & Plan  Resume home dose of Synthroid  Last month her TSH was within normal limits    Type 2 diabetes mellitus with hyperglycemia, without long-term current use of insulin ([facility])- (present on admission)  Assessment & Plan  Continue Insulin-sliding scale, accu-checks and hypoglycemia protocol.  Continue with Consistent Carbohydrate diet   Hold home dose of trulicity     Depression- (present on admission)  Assessment & Plan  Resume home dose of Prozac    GERD (gastroesophageal reflux disease)- (present on admission)  Assessment & Plan  Continue with Pepcid    Borderline personality disorder in adult ([facility])- (present on admission)  Assessment & Plan  Continue with home dose of Prozac, Geodon and trazodone    Mastoiditis ([facility])- (present on admission)  Assessment & Plan  Continue with home dose of Prozac, Geodon and trazodone  CT head shows Stable partial opacification of left mastoid air cells, consider effusion or component of mastoiditis as clinically appropriate  Resume augmentin.      Asthma([facility])- (present on admission)  Assessment & Plan  Controlled on Symbicort, montelukast      The total time spent face to face with this patient was about 35 mins of which 60% of time was spent on counseling, review of records including pertinent lab data and studies, as well as discussing diagnostic evaluation and work up, planned therapeutic interventions and future disposition of care. Where indicated, the assessment and plan reflect discussion of patient with consultants, other healthcare providers, family members, and additional research needed to obtain further information in formulating the plan of care of this patient.       VTE prophylaxis: Lovenox

## 2019-12-15 NOTE — THERAPY
"Physical Therapy Evaluation completed.   Bed Mobility:  Supine to Sit: Supervised  Transfers: Sit to Stand: Supervised  Gait: Level Of Assist: Minimal Assist with Front-Wheel Walker       Plan of Care: Will benefit from Physical Therapy 3 times per week  Discharge Recommendations: Equipment: Will Continue to Assess for Equipment Needs. Post-acute therapy Discharge to home with outpatient or home health for additional skilled therapy services.    See \"Rehab Therapy-Acute\" Patient Summary Report for complete documentation.     "

## 2019-12-15 NOTE — CARE PLAN
Problem: Safety  Goal: Will remain free from injury  Outcome: PROGRESSING AS EXPECTED  Goal: Will remain free from falls  Outcome: PROGRESSING AS EXPECTED     Problem: Infection  Goal: Will remain free from infection  Outcome: PROGRESSING AS EXPECTED     Problem: Knowledge Deficit  Goal: Knowledge of the prescribed therapeutic regimen will improve  Outcome: PROGRESSING AS EXPECTED  Goal: Knowledge of disease process/condition, treatment plan, diagnostic tests, and medications will improve  Outcome: PROGRESSING AS EXPECTED     Problem: Discharge Barriers/Planning  Goal: Patient's continuum of care needs will be met  Outcome: PROGRESSING AS EXPECTED     Problem: Venous Thromboembolism (VTW)/Deep Vein Thrombosis (DVT) Prevention:  Goal: Patient will participate in Venous Thrombosis (VTE)/Deep Vein Thrombosis (DVT)Prevention Measures  Outcome: PROGRESSING AS EXPECTED     Problem: Pain Management  Goal: Pain level will decrease to patient's comfort goal  Outcome: PROGRESSING AS EXPECTED

## 2019-12-16 PROBLEM — H70.90 MASTOIDITIS: Status: ACTIVE | Noted: 2019-12-16

## 2019-12-16 LAB
ALBUMIN SERPL BCP-MCNC: 4.5 G/DL (ref 3.2–4.9)
ALBUMIN/GLOB SERPL: 1.8 G/DL
ALP SERPL-CCNC: 54 U/L (ref 30–99)
ALT SERPL-CCNC: 30 U/L (ref 2–50)
ANION GAP SERPL CALC-SCNC: 13 MMOL/L (ref 0–11.9)
AST SERPL-CCNC: 14 U/L (ref 12–45)
BASOPHILS # BLD AUTO: 0.4 % (ref 0–1.8)
BASOPHILS # BLD: 0.04 K/UL (ref 0–0.12)
BILIRUB SERPL-MCNC: 0.8 MG/DL (ref 0.1–1.5)
BUN SERPL-MCNC: 16 MG/DL (ref 8–22)
CALCIUM SERPL-MCNC: 9.3 MG/DL (ref 8.5–10.5)
CHLORIDE SERPL-SCNC: 102 MMOL/L (ref 96–112)
CO2 SERPL-SCNC: 21 MMOL/L (ref 20–33)
CREAT SERPL-MCNC: 0.99 MG/DL (ref 0.5–1.4)
EOSINOPHIL # BLD AUTO: 0.17 K/UL (ref 0–0.51)
EOSINOPHIL NFR BLD: 1.5 % (ref 0–6.9)
ERYTHROCYTE [DISTWIDTH] IN BLOOD BY AUTOMATED COUNT: 42.7 FL (ref 35.9–50)
GLOBULIN SER CALC-MCNC: 2.5 G/DL (ref 1.9–3.5)
GLUCOSE BLD-MCNC: 111 MG/DL (ref 65–99)
GLUCOSE BLD-MCNC: 119 MG/DL (ref 65–99)
GLUCOSE BLD-MCNC: 129 MG/DL (ref 65–99)
GLUCOSE BLD-MCNC: 161 MG/DL (ref 65–99)
GLUCOSE SERPL-MCNC: 172 MG/DL (ref 65–99)
HCT VFR BLD AUTO: 43.7 % (ref 37–47)
HGB BLD-MCNC: 14.9 G/DL (ref 12–16)
IMM GRANULOCYTES # BLD AUTO: 0.06 K/UL (ref 0–0.11)
IMM GRANULOCYTES NFR BLD AUTO: 0.5 % (ref 0–0.9)
LYMPHOCYTES # BLD AUTO: 1.65 K/UL (ref 1–4.8)
LYMPHOCYTES NFR BLD: 14.8 % (ref 22–41)
MCH RBC QN AUTO: 31 PG (ref 27–33)
MCHC RBC AUTO-ENTMCNC: 34.1 G/DL (ref 33.6–35)
MCV RBC AUTO: 91 FL (ref 81.4–97.8)
MONOCYTES # BLD AUTO: 0.54 K/UL (ref 0–0.85)
MONOCYTES NFR BLD AUTO: 4.8 % (ref 0–13.4)
NEUTROPHILS # BLD AUTO: 8.68 K/UL (ref 2–7.15)
NEUTROPHILS NFR BLD: 78 % (ref 44–72)
NRBC # BLD AUTO: 0 K/UL
NRBC BLD-RTO: 0 /100 WBC
PLATELET # BLD AUTO: 202 K/UL (ref 164–446)
PMV BLD AUTO: 12.1 FL (ref 9–12.9)
POTASSIUM SERPL-SCNC: 3.6 MMOL/L (ref 3.6–5.5)
PROT SERPL-MCNC: 7 G/DL (ref 6–8.2)
RBC # BLD AUTO: 4.8 M/UL (ref 4.2–5.4)
SODIUM SERPL-SCNC: 136 MMOL/L (ref 135–145)
WBC # BLD AUTO: 11.1 K/UL (ref 4.8–10.8)

## 2019-12-16 PROCEDURE — 80053 COMPREHEN METABOLIC PANEL: CPT

## 2019-12-16 PROCEDURE — 700111 HCHG RX REV CODE 636 W/ 250 OVERRIDE (IP): Performed by: NURSE PRACTITIONER

## 2019-12-16 PROCEDURE — 700101 HCHG RX REV CODE 250: Performed by: INTERNAL MEDICINE

## 2019-12-16 PROCEDURE — 94640 AIRWAY INHALATION TREATMENT: CPT

## 2019-12-16 PROCEDURE — 82962 GLUCOSE BLOOD TEST: CPT | Mod: 91

## 2019-12-16 PROCEDURE — 85025 COMPLETE CBC W/AUTO DIFF WBC: CPT

## 2019-12-16 PROCEDURE — 96372 THER/PROPH/DIAG INJ SC/IM: CPT

## 2019-12-16 PROCEDURE — 700102 HCHG RX REV CODE 250 W/ 637 OVERRIDE(OP): Performed by: INTERNAL MEDICINE

## 2019-12-16 PROCEDURE — 36415 COLL VENOUS BLD VENIPUNCTURE: CPT

## 2019-12-16 PROCEDURE — 94760 N-INVAS EAR/PLS OXIMETRY 1: CPT

## 2019-12-16 PROCEDURE — A9270 NON-COVERED ITEM OR SERVICE: HCPCS | Performed by: HOSPITALIST

## 2019-12-16 PROCEDURE — 700111 HCHG RX REV CODE 636 W/ 250 OVERRIDE (IP): Performed by: HOSPITALIST

## 2019-12-16 PROCEDURE — 700111 HCHG RX REV CODE 636 W/ 250 OVERRIDE (IP): Performed by: INTERNAL MEDICINE

## 2019-12-16 PROCEDURE — 700102 HCHG RX REV CODE 250 W/ 637 OVERRIDE(OP): Performed by: HOSPITALIST

## 2019-12-16 PROCEDURE — A9270 NON-COVERED ITEM OR SERVICE: HCPCS | Performed by: INTERNAL MEDICINE

## 2019-12-16 PROCEDURE — 99232 SBSQ HOSP IP/OBS MODERATE 35: CPT | Performed by: HOSPITALIST

## 2019-12-16 PROCEDURE — 770006 HCHG ROOM/CARE - MED/SURG/GYN SEMI*

## 2019-12-16 PROCEDURE — 96376 TX/PRO/DX INJ SAME DRUG ADON: CPT

## 2019-12-16 RX ADMIN — PREGABALIN 300 MG: 100 CAPSULE ORAL at 04:48

## 2019-12-16 RX ADMIN — ZIPRASIDONE HYDROCHLORIDE 80 MG: 80 CAPSULE ORAL at 17:02

## 2019-12-16 RX ADMIN — ONDANSETRON 4 MG: 2 INJECTION INTRAMUSCULAR; INTRAVENOUS at 07:39

## 2019-12-16 RX ADMIN — SODIUM BICARBONATE 650 MG: 650 TABLET ORAL at 04:47

## 2019-12-16 RX ADMIN — GABAPENTIN 300 MG: 300 CAPSULE ORAL at 09:13

## 2019-12-16 RX ADMIN — ALBUTEROL SULFATE 2 PUFF: 90 AEROSOL, METERED RESPIRATORY (INHALATION) at 07:40

## 2019-12-16 RX ADMIN — BUTALBITAL, ACETAMINOPHEN, AND CAFFEINE 1 TABLET: 50; 325; 40 TABLET ORAL at 10:37

## 2019-12-16 RX ADMIN — KETOROLAC TROMETHAMINE 15 MG: 30 INJECTION, SOLUTION INTRAMUSCULAR at 02:39

## 2019-12-16 RX ADMIN — BUTALBITAL, ACETAMINOPHEN, AND CAFFEINE 1 TABLET: 50; 325; 40 TABLET ORAL at 04:47

## 2019-12-16 RX ADMIN — TIOTROPIUM BROMIDE 1 CAPSULE: 18 CAPSULE ORAL; RESPIRATORY (INHALATION) at 04:51

## 2019-12-16 RX ADMIN — BUSPIRONE HYDROCHLORIDE 10 MG: 10 TABLET ORAL at 04:49

## 2019-12-16 RX ADMIN — POTASSIUM CHLORIDE 20 MEQ: 20 TABLET, EXTENDED RELEASE ORAL at 04:47

## 2019-12-16 RX ADMIN — MYCOPHENOLATE MOFETIL 1000 MG: 250 CAPSULE ORAL at 05:06

## 2019-12-16 RX ADMIN — ONDANSETRON 4 MG: 2 INJECTION INTRAMUSCULAR; INTRAVENOUS at 17:53

## 2019-12-16 RX ADMIN — MECLIZINE HYDROCHLORIDE 12.5 MG: 25 TABLET ORAL at 11:51

## 2019-12-16 RX ADMIN — FLUOXETINE HYDROCHLORIDE 40 MG: 20 CAPSULE ORAL at 04:49

## 2019-12-16 RX ADMIN — ALBUTEROL SULFATE 2.5 MG: 2.5 SOLUTION RESPIRATORY (INHALATION) at 08:07

## 2019-12-16 RX ADMIN — MYCOPHENOLATE MOFETIL 1000 MG: 250 CAPSULE ORAL at 17:02

## 2019-12-16 RX ADMIN — ALBUTEROL SULFATE 2 PUFF: 90 AEROSOL, METERED RESPIRATORY (INHALATION) at 11:52

## 2019-12-16 RX ADMIN — GABAPENTIN 300 MG: 300 CAPSULE ORAL at 17:02

## 2019-12-16 RX ADMIN — ONDANSETRON 4 MG: 2 INJECTION INTRAMUSCULAR; INTRAVENOUS at 12:42

## 2019-12-16 RX ADMIN — FAMOTIDINE 20 MG: 20 TABLET ORAL at 04:48

## 2019-12-16 RX ADMIN — AMOXICILLIN AND CLAVULANATE POTASSIUM 1 TABLET: 875; 125 TABLET, FILM COATED ORAL at 04:47

## 2019-12-16 RX ADMIN — AMOXICILLIN AND CLAVULANATE POTASSIUM 1 TABLET: 875; 125 TABLET, FILM COATED ORAL at 17:01

## 2019-12-16 RX ADMIN — PREGABALIN 300 MG: 100 CAPSULE ORAL at 17:02

## 2019-12-16 RX ADMIN — GABAPENTIN 300 MG: 300 CAPSULE ORAL at 13:45

## 2019-12-16 RX ADMIN — ENOXAPARIN SODIUM 40 MG: 100 INJECTION SUBCUTANEOUS at 04:56

## 2019-12-16 RX ADMIN — BUSPIRONE HYDROCHLORIDE 10 MG: 10 TABLET ORAL at 18:00

## 2019-12-16 RX ADMIN — LEVOTHYROXINE SODIUM 75 MCG: 75 TABLET ORAL at 04:51

## 2019-12-16 RX ADMIN — SENNOSIDES AND DOCUSATE SODIUM 2 TABLET: 8.6; 5 TABLET ORAL at 17:06

## 2019-12-16 RX ADMIN — FUROSEMIDE 80 MG: 40 TABLET ORAL at 04:55

## 2019-12-16 RX ADMIN — KETOROLAC TROMETHAMINE 15 MG: 30 INJECTION, SOLUTION INTRAMUSCULAR at 09:17

## 2019-12-16 RX ADMIN — TRAZODONE HYDROCHLORIDE 100 MG: 100 TABLET ORAL at 17:06

## 2019-12-16 RX ADMIN — GABAPENTIN 300 MG: 300 CAPSULE ORAL at 21:45

## 2019-12-16 RX ADMIN — ASPIRIN 81 MG: 81 TABLET, COATED ORAL at 04:48

## 2019-12-16 RX ADMIN — ZIPRASIDONE HYDROCHLORIDE 80 MG: 80 CAPSULE ORAL at 04:47

## 2019-12-16 RX ADMIN — KETOROLAC TROMETHAMINE 15 MG: 30 INJECTION, SOLUTION INTRAMUSCULAR at 17:04

## 2019-12-16 RX ADMIN — BUDESONIDE AND FORMOTEROL FUMARATE DIHYDRATE 2 PUFF: 160; 4.5 AEROSOL RESPIRATORY (INHALATION) at 17:01

## 2019-12-16 RX ADMIN — MECLIZINE HYDROCHLORIDE 12.5 MG: 25 TABLET ORAL at 04:47

## 2019-12-16 RX ADMIN — SODIUM BICARBONATE 650 MG: 650 TABLET ORAL at 17:03

## 2019-12-16 RX ADMIN — BUDESONIDE AND FORMOTEROL FUMARATE DIHYDRATE 2 PUFF: 160; 4.5 AEROSOL RESPIRATORY (INHALATION) at 04:48

## 2019-12-16 RX ADMIN — POTASSIUM CHLORIDE 20 MEQ: 20 TABLET, EXTENDED RELEASE ORAL at 17:02

## 2019-12-16 RX ADMIN — DIPHENHYDRAMINE HYDROCHLORIDE 25 MG: 25 TABLET ORAL at 21:45

## 2019-12-16 ASSESSMENT — ENCOUNTER SYMPTOMS
STRIDOR: 0
BACK PAIN: 1
CLAUDICATION: 0
NERVOUS/ANXIOUS: 0
WEAKNESS: 0
SENSORY CHANGE: 0
NECK PAIN: 0
SHORTNESS OF BREATH: 0
BLURRED VISION: 0
HEARTBURN: 0
SPUTUM PRODUCTION: 0
ABDOMINAL PAIN: 0
DOUBLE VISION: 0
PND: 0
DIZZINESS: 0
PHOTOPHOBIA: 0
BLOOD IN STOOL: 0
NAUSEA: 1
EYE PAIN: 0
SORE THROAT: 0
CHILLS: 0
SPEECH CHANGE: 0
FEVER: 0
MYALGIAS: 1
HEADACHES: 0
ORTHOPNEA: 0
DEPRESSION: 0
TREMORS: 0
DIARRHEA: 0
TINGLING: 0
CONSTIPATION: 0
HEMOPTYSIS: 0
COUGH: 0
MEMORY LOSS: 0
PALPITATIONS: 0
VOMITING: 0

## 2019-12-16 ASSESSMENT — PATIENT HEALTH QUESTIONNAIRE - PHQ9
2. FEELING DOWN, DEPRESSED, IRRITABLE, OR HOPELESS: NOT AT ALL
1. LITTLE INTEREST OR PLEASURE IN DOING THINGS: NOT AT ALL
SUM OF ALL RESPONSES TO PHQ9 QUESTIONS 1 AND 2: 0

## 2019-12-16 NOTE — ASSESSMENT & PLAN NOTE
12/24-12/29 - stable  - Previous Plans by Other Providers -  CT head shows Stable partial opacification of left mastoid air cells, consider effusion or component of mastoiditis as clinically appropriate  Resume augmentin.

## 2019-12-16 NOTE — CARE PLAN
Problem: Safety  Goal: Will remain free from injury  Outcome: PROGRESSING AS EXPECTED  Goal: Will remain free from falls  Outcome: PROGRESSING AS EXPECTED     Problem: Psychosocial Needs:  Goal: Level of anxiety will decrease  Outcome: PROGRESSING SLOWER THAN EXPECTED     Problem: Mobility  Goal: Risk for activity intolerance will decrease  Outcome: PROGRESSING SLOWER THAN EXPECTED

## 2019-12-16 NOTE — CARE PLAN
Problem: Bronchoconstriction:  Goal: Improve in air movement and diminished wheezing  Outcome: PROGRESSING AS EXPECTED   PT called for PRN tx shortly after rescue inhaler given, pt request for another tx anyways.  Pt state tx helped.  100% on 2 L titrated to 1

## 2019-12-16 NOTE — THERAPY
"Occupational Therapy Evaluation completed.   Functional Status:  SPV sit>stand, Angelita standing balance 2/2 odd presentation of balance, CGA stand pivot transfers, SPV seated grooming (face wash, hair brush), completed seated shower with [facility] [place] staff this morning  Plan of Care: DC needs only  Discharge Recommendations:  Equipment: No Equipment Needed. Post-acute therapy Recommend home health transitional care for continued occupational therapy services.     See \"Rehab Therapy-Acute\" Patient Summary Report for complete documentation.    Pt is 30yo female admitted with AMS, found down by her grandmother, per chart suspect polypharmacy. Pt known to this OT from previous admission, pt has multiple recent admissions for similar complaints. Pt completed sit>stand without difficulty, upon standing with FWW pt began to bounce up and down, seemingly jumping slightly, and sway forward and backwards, no LOB.  Presentation in standing does not appear consistent with neurological symptoms, pt had no involuntary movements or shaking in any extremity while seated. With verbal cuing pt reduced degree of bouncing/shaking in standing and demonstrated a few side steps. Pt completed stand pivot txf x2, chair>EOB and back to chair, attempt to ambulate to BR was deferred 2/2 inconsistent presentation of functional balance. Pt reports she owns all DME to increase home safety including wheelchair, BSC, tub transfer bench, grab bars, reacher, sock aid, long handled sponge, hospital bed, and slide board. Pt perseverative on medication changes and requesting doses/treatments of various medications, pt reminded of OTs role in assessing functional independence and assured pt her RN and MD will manager her medications appropriately. Pt baseline fluctuates as she reports she has been bed bound, w/c bound and ambulatory household distances  w/FWW with hx falls in recent months. Pt appears able to dc home at w/c level with caregiver support 2/2 fall " risk while ambulating. OT will remain available for d/c needs only. HHOT to address higher level IADL deficits upon d/c.

## 2019-12-16 NOTE — PROGRESS NOTES
Assumed care of pt report received from day shift. Pt in bed no signs of distress. Padma from laboratory called to inform RN of WBC=79.8 Dr. Way informed no new orders at this time. Will continue to monitor.

## 2019-12-16 NOTE — PROGRESS NOTES
Hospital Medicine Daily Progress Note    Date of Service  12/16/2019    Chief Complaint  29 y.o. female admitted 12/13/2019 with AMS    Hospital Course    per HPI  29 y.o. female who presented 12/13/2019 with altered level consciousness.  At this point in time, patient is mostly nonverbal, information obtained from her grandmother who was the one who found her.  She states whenever she left she was sleeping but just prior to this had been normal.  She states this morning she had some shortness of breath, she does have a history of asthma.  She did use breathing treatments and a home nurse helped with her breathing and it did seem to normalize.  Her grandmother left, was gone around 90 minutes, upon returning home she found her down, unconscious.  She did call 911.  She did attempt to wake up her granddaughter for quite some time and eventually she did wake up but seemed very groggy and confused.  She states she did just finish her antibiotics for her UTI/bronchitis, otherwise has been taking her usual home medications.  She has not been missing any medications nor taking extra of her medications.  I did discuss the case including labs and imaging with the ER physician.             Interval Problem Update  Patient is still complaining of mild nausea, having back spasms which is chronic, 7/10 in severity at times. Feels like she needs more time to feel better. Denies fevers/chills, denies dysuria or diarrhea.    Consultants/Specialty  None    Code Status  Full code    Disposition  TBD    Review of Systems  Review of Systems   Constitutional: Positive for malaise/fatigue. Negative for chills and fever.   HENT: Negative for congestion, hearing loss, sore throat and tinnitus.    Eyes: Negative for blurred vision, double vision, photophobia and pain.   Respiratory: Negative for cough, hemoptysis, sputum production, shortness of breath and stridor.    Cardiovascular: Negative for chest pain, palpitations, orthopnea,  claudication and PND.   Gastrointestinal: Positive for nausea. Negative for abdominal pain, blood in stool, constipation, diarrhea, heartburn, melena and vomiting.   Genitourinary: Negative for dysuria, frequency and urgency.   Musculoskeletal: Positive for back pain (spasms) and myalgias. Negative for neck pain.   Neurological: Negative for dizziness, tingling, tremors, sensory change, speech change, weakness and headaches.   Psychiatric/Behavioral: Negative for depression, memory loss and suicidal ideas. The patient is not nervous/anxious.    All other systems reviewed and are negative.       Physical Exam  Temp:  [36.4 °C (97.6 °F)-37.2 °C (98.9 °F)] 36.4 °C (97.6 °F)  Pulse:  [] 81  Resp:  [16-21] 21  BP: ()/(58-60) 107/58  SpO2:  [93 %-98 %] 98 %    Physical Exam  Vitals signs and nursing note reviewed.   Constitutional:       General: She is not in acute distress.     Appearance: Normal appearance. She is obese. She is not ill-appearing.   HENT:      Head: Normocephalic and atraumatic.      Right Ear: Tympanic membrane normal.      Left Ear: Tympanic membrane normal.      Nose: No congestion.      Mouth/Throat:      Mouth: Mucous membranes are dry.      Pharynx: Oropharynx is clear. No oropharyngeal exudate or posterior oropharyngeal erythema.   Eyes:      Extraocular Movements: Extraocular movements intact.      Conjunctiva/sclera: Conjunctivae normal.      Pupils: Pupils are equal, round, and reactive to light.   Neck:      Musculoskeletal: Normal range of motion and neck supple. No neck rigidity.   Cardiovascular:      Rate and Rhythm: Normal rate.      Pulses: Normal pulses.      Heart sounds: No murmur.   Pulmonary:      Effort: Pulmonary effort is normal. No respiratory distress.      Breath sounds: Normal breath sounds. No wheezing or rales.   Abdominal:      General: Abdomen is flat. Bowel sounds are normal. There is no distension.      Palpations: Abdomen is soft.      Tenderness: There is  no tenderness. There is no guarding.   Musculoskeletal: Normal range of motion.         General: No swelling or tenderness.   Skin:     General: Skin is warm and dry.      Capillary Refill: Capillary refill takes less than 2 seconds.      Coloration: Skin is not pale.   Neurological:      General: No focal deficit present.      Mental Status: She is alert and oriented to person, place, and time. Mental status is at baseline.      Cranial Nerves: No cranial nerve deficit.   Psychiatric:      Comments: Very anxious         Fluids    Intake/Output Summary (Last 24 hours) at 12/16/2019 1057  Last data filed at 12/16/2019 0700  Gross per 24 hour   Intake --   Output 700 ml   Net -700 ml       Laboratory  Recent Labs     12/13/19  1850 12/14/19  0503 12/16/19  0556   WBC 7.7 7.3 11.1*   RBC 4.41 4.23 4.80   HEMOGLOBIN 13.5 13.2 14.9   HEMATOCRIT 41.5 38.5 43.7   MCV 94.1 91.0 91.0   MCH 30.6 31.2 31.0   MCHC 32.5* 34.3 34.1   RDW 44.0 42.0 42.7   PLATELETCT 174 166 202   MPV 12.2 11.7 12.1     Recent Labs     12/13/19  1850 12/14/19  0503 12/16/19  0556   SODIUM 134* 139 136   POTASSIUM 5.1 3.9 3.6   CHLORIDE 106 106 102   CO2 16* 23 21   GLUCOSE 237* 81 172*   BUN 14 14 16   CREATININE 0.78 0.68 0.99   CALCIUM 9.5 9.2 9.3                   Imaging  CT-HEAD W/O   Final Result         1.  No acute intracranial abnormality.   2.  Stable partial opacification of left mastoid air cells, consider effusion or component of mastoiditis as clinically appropriate      DX-CHEST-PORTABLE (1 VIEW)   Final Result      No evidence of acute cardiopulmonary process.           Assessment/Plan  * Altered level of consciousness- (present on admission)  Assessment & Plan  Highly suspect this is secondary to polypharmacy, patient is on a lot of sedating medications  She does have a history of accidental overdose in the past but denies this  No seizure activity noted, no evidence of rhabdomyolysis  Currently mentation has resolved, is AAOx4  past 2 days.  He had a long discussion in terms of decreasing her sedating medications.    Morbidly obese (HCC)- (present on admission)  Assessment & Plan  Body mass index is 46.26 kg/m².  Encourage healthy lifestyle and physical activity.    ASTHMA  Assessment & Plan  Controlled on Symbicort, montelukast  Continue RT protocol, duo nebs, Pep therapy if warranted, and incentive spirometry.       Mastoiditis  Assessment & Plan  Continue with home dose of Prozac, Geodon and trazodone  CT head shows Stable partial opacification of left mastoid air cells, consider effusion or component of mastoiditis as clinically appropriate  Resume augmentin.    Acquired hypothyroidism- (present on admission)  Assessment & Plan  Resume home dose of Synthroid  Last month her TSH was within normal limits    Type 2 diabetes mellitus with hyperglycemia, without long-term current use of insulin ([facility])- (present on admission)  Assessment & Plan  Continue Insulin-sliding scale, accu-checks and hypoglycemia protocol.  Continue with Consistent Carbohydrate diet   Hold home dose of trulicity     Depression- (present on admission)  Assessment & Plan  Resume home dose of Prozac    GERD (gastroesophageal reflux disease)- (present on admission)  Assessment & Plan  Continue with Pepcid    Borderline personality disorder in adult ([facility])- (present on admission)  Assessment & Plan  Continue with home dose of Prozac, Geodon and trazodone      Patient plan of care discussed at multidisplinary team rounds and with patient and R.N at [facility].      VTE prophylaxis: Lovenox

## 2019-12-16 NOTE — ASSESSMENT & PLAN NOTE
12/26-12/29 - Patient on 5 respiratory medications for a problem very likely exacerbated by fluid overload from daily soda intake.  Singulair also may be a neuro-active medication as well adding one more neuroactive medication to the list.  12/25 - Patient's 6-pack of 20oz dr aguilar is gone... might be a daily overhydration total body interstitial edema from severe soda intake component to her asthma...  12/24 - stable. Patient had a 6 pack of 20oz bottles of diet dr pepper on the table.  - Previous Plans by Other Providers -  Controlled on Symbicort, montelukast  Continue RT protocol, duo nebs, Pep therapy if warranted, and incentive spirometry.

## 2019-12-17 ENCOUNTER — APPOINTMENT (OUTPATIENT)
Dept: RADIOLOGY | Facility: MEDICAL CENTER | Age: 29
DRG: 074 | End: 2019-12-17
Attending: INTERNAL MEDICINE
Payer: MEDICARE

## 2019-12-17 PROBLEM — Z86.69 HISTORY OF OPTIC NEURITIS: Status: ACTIVE | Noted: 2019-12-17

## 2019-12-17 LAB
ANION GAP SERPL CALC-SCNC: 8 MMOL/L (ref 0–11.9)
APPEARANCE UR: CLEAR
BILIRUB UR QL STRIP.AUTO: NEGATIVE
BUN SERPL-MCNC: 10 MG/DL (ref 8–22)
CALCIUM SERPL-MCNC: 8.6 MG/DL (ref 8.5–10.5)
CHLORIDE SERPL-SCNC: 105 MMOL/L (ref 96–112)
CO2 SERPL-SCNC: 22 MMOL/L (ref 20–33)
COLOR UR: YELLOW
CREAT SERPL-MCNC: 0.81 MG/DL (ref 0.5–1.4)
CRP SERPL HS-MCNC: 4.16 MG/DL (ref 0–0.75)
EKG IMPRESSION: NORMAL
ERYTHROCYTE [DISTWIDTH] IN BLOOD BY AUTOMATED COUNT: 43.4 FL (ref 35.9–50)
ERYTHROCYTE [SEDIMENTATION RATE] IN BLOOD BY WESTERGREN METHOD: 14 MM/HOUR (ref 0–20)
GLUCOSE BLD-MCNC: 120 MG/DL (ref 65–99)
GLUCOSE BLD-MCNC: 120 MG/DL (ref 65–99)
GLUCOSE BLD-MCNC: 141 MG/DL (ref 65–99)
GLUCOSE BLD-MCNC: 166 MG/DL (ref 65–99)
GLUCOSE SERPL-MCNC: 175 MG/DL (ref 65–99)
GLUCOSE UR STRIP.AUTO-MCNC: NEGATIVE MG/DL
HCT VFR BLD AUTO: 38.8 % (ref 37–47)
HGB BLD-MCNC: 12.8 G/DL (ref 12–16)
KETONES UR STRIP.AUTO-MCNC: NEGATIVE MG/DL
LEUKOCYTE ESTERASE UR QL STRIP.AUTO: NEGATIVE
MCH RBC QN AUTO: 30.6 PG (ref 27–33)
MCHC RBC AUTO-ENTMCNC: 33 G/DL (ref 33.6–35)
MCV RBC AUTO: 92.8 FL (ref 81.4–97.8)
MICRO URNS: NORMAL
NITRITE UR QL STRIP.AUTO: NEGATIVE
PH UR STRIP.AUTO: 5.5 [PH] (ref 5–8)
PLATELET # BLD AUTO: 154 K/UL (ref 164–446)
PMV BLD AUTO: 12.3 FL (ref 9–12.9)
POTASSIUM SERPL-SCNC: 3.5 MMOL/L (ref 3.6–5.5)
PROT UR QL STRIP: NEGATIVE MG/DL
RBC # BLD AUTO: 4.18 M/UL (ref 4.2–5.4)
RBC UR QL AUTO: NEGATIVE
SODIUM SERPL-SCNC: 135 MMOL/L (ref 135–145)
SP GR UR STRIP.AUTO: 1.02
UROBILINOGEN UR STRIP.AUTO-MCNC: 0.2 MG/DL
WBC # BLD AUTO: 5.9 K/UL (ref 4.8–10.8)

## 2019-12-17 PROCEDURE — 86140 C-REACTIVE PROTEIN: CPT

## 2019-12-17 PROCEDURE — 36415 COLL VENOUS BLD VENIPUNCTURE: CPT

## 2019-12-17 PROCEDURE — 700102 HCHG RX REV CODE 250 W/ 637 OVERRIDE(OP): Performed by: INTERNAL MEDICINE

## 2019-12-17 PROCEDURE — 73501 X-RAY EXAM HIP UNI 1 VIEW: CPT | Mod: RT

## 2019-12-17 PROCEDURE — 700102 HCHG RX REV CODE 250 W/ 637 OVERRIDE(OP): Performed by: HOSPITALIST

## 2019-12-17 PROCEDURE — 700111 HCHG RX REV CODE 636 W/ 250 OVERRIDE (IP): Performed by: INTERNAL MEDICINE

## 2019-12-17 PROCEDURE — 700111 HCHG RX REV CODE 636 W/ 250 OVERRIDE (IP): Performed by: HOSPITALIST

## 2019-12-17 PROCEDURE — 82962 GLUCOSE BLOOD TEST: CPT

## 2019-12-17 PROCEDURE — 93005 ELECTROCARDIOGRAM TRACING: CPT | Performed by: INTERNAL MEDICINE

## 2019-12-17 PROCEDURE — 85027 COMPLETE CBC AUTOMATED: CPT

## 2019-12-17 PROCEDURE — 770006 HCHG ROOM/CARE - MED/SURG/GYN SEMI*

## 2019-12-17 PROCEDURE — 700101 HCHG RX REV CODE 250: Performed by: INTERNAL MEDICINE

## 2019-12-17 PROCEDURE — A9270 NON-COVERED ITEM OR SERVICE: HCPCS | Performed by: INTERNAL MEDICINE

## 2019-12-17 PROCEDURE — 700111 HCHG RX REV CODE 636 W/ 250 OVERRIDE (IP): Performed by: NURSE PRACTITIONER

## 2019-12-17 PROCEDURE — 80048 BASIC METABOLIC PNL TOTAL CA: CPT

## 2019-12-17 PROCEDURE — 93010 ELECTROCARDIOGRAM REPORT: CPT | Performed by: INTERNAL MEDICINE

## 2019-12-17 PROCEDURE — 99232 SBSQ HOSP IP/OBS MODERATE 35: CPT | Performed by: INTERNAL MEDICINE

## 2019-12-17 PROCEDURE — A9270 NON-COVERED ITEM OR SERVICE: HCPCS | Performed by: HOSPITALIST

## 2019-12-17 PROCEDURE — 85652 RBC SED RATE AUTOMATED: CPT

## 2019-12-17 PROCEDURE — 94640 AIRWAY INHALATION TREATMENT: CPT

## 2019-12-17 PROCEDURE — 97110 THERAPEUTIC EXERCISES: CPT

## 2019-12-17 PROCEDURE — 81003 URINALYSIS AUTO W/O SCOPE: CPT

## 2019-12-17 PROCEDURE — 97530 THERAPEUTIC ACTIVITIES: CPT

## 2019-12-17 RX ORDER — IBUPROFEN 800 MG/1
800 TABLET ORAL 3 TIMES DAILY PRN
Status: DISCONTINUED | OUTPATIENT
Start: 2019-12-17 | End: 2019-12-31 | Stop reason: HOSPADM

## 2019-12-17 RX ORDER — PREDNISONE 10 MG/1
10 TABLET ORAL DAILY
Status: DISCONTINUED | OUTPATIENT
Start: 2019-12-17 | End: 2019-12-19

## 2019-12-17 RX ADMIN — TIOTROPIUM BROMIDE 1 CAPSULE: 18 CAPSULE ORAL; RESPIRATORY (INHALATION) at 06:00

## 2019-12-17 RX ADMIN — ALBUTEROL SULFATE 2 PUFF: 90 AEROSOL, METERED RESPIRATORY (INHALATION) at 01:11

## 2019-12-17 RX ADMIN — FLUOXETINE HYDROCHLORIDE 40 MG: 20 CAPSULE ORAL at 05:34

## 2019-12-17 RX ADMIN — ASPIRIN 81 MG: 81 TABLET, COATED ORAL at 05:20

## 2019-12-17 RX ADMIN — BUTALBITAL, ACETAMINOPHEN, AND CAFFEINE 1 TABLET: 50; 325; 40 TABLET ORAL at 17:49

## 2019-12-17 RX ADMIN — POTASSIUM CHLORIDE 20 MEQ: 20 TABLET, EXTENDED RELEASE ORAL at 17:08

## 2019-12-17 RX ADMIN — GABAPENTIN 300 MG: 300 CAPSULE ORAL at 08:01

## 2019-12-17 RX ADMIN — KETOROLAC TROMETHAMINE 15 MG: 30 INJECTION, SOLUTION INTRAMUSCULAR at 08:01

## 2019-12-17 RX ADMIN — MECLIZINE HYDROCHLORIDE 12.5 MG: 25 TABLET ORAL at 10:20

## 2019-12-17 RX ADMIN — PREGABALIN 300 MG: 100 CAPSULE ORAL at 05:20

## 2019-12-17 RX ADMIN — TRAZODONE HYDROCHLORIDE 100 MG: 100 TABLET ORAL at 17:09

## 2019-12-17 RX ADMIN — MYCOPHENOLATE MOFETIL 1000 MG: 250 CAPSULE ORAL at 12:46

## 2019-12-17 RX ADMIN — BUDESONIDE AND FORMOTEROL FUMARATE DIHYDRATE 2 PUFF: 160; 4.5 AEROSOL RESPIRATORY (INHALATION) at 05:21

## 2019-12-17 RX ADMIN — MYCOPHENOLATE MOFETIL 1000 MG: 250 CAPSULE ORAL at 22:04

## 2019-12-17 RX ADMIN — ENOXAPARIN SODIUM 40 MG: 100 INJECTION SUBCUTANEOUS at 05:19

## 2019-12-17 RX ADMIN — ALBUTEROL SULFATE 2.5 MG: 2.5 SOLUTION RESPIRATORY (INHALATION) at 04:48

## 2019-12-17 RX ADMIN — SODIUM BICARBONATE 650 MG: 650 TABLET ORAL at 05:19

## 2019-12-17 RX ADMIN — LEVOTHYROXINE SODIUM 75 MCG: 75 TABLET ORAL at 05:34

## 2019-12-17 RX ADMIN — KETOROLAC TROMETHAMINE 15 MG: 30 INJECTION, SOLUTION INTRAMUSCULAR at 01:48

## 2019-12-17 RX ADMIN — BUTALBITAL, ACETAMINOPHEN, AND CAFFEINE 1 TABLET: 50; 325; 40 TABLET ORAL at 01:09

## 2019-12-17 RX ADMIN — BUDESONIDE AND FORMOTEROL FUMARATE DIHYDRATE 2 PUFF: 160; 4.5 AEROSOL RESPIRATORY (INHALATION) at 17:09

## 2019-12-17 RX ADMIN — BUTALBITAL, ACETAMINOPHEN, AND CAFFEINE 1 TABLET: 50; 325; 40 TABLET ORAL at 11:32

## 2019-12-17 RX ADMIN — ONDANSETRON 4 MG: 2 INJECTION INTRAMUSCULAR; INTRAVENOUS at 07:19

## 2019-12-17 RX ADMIN — BUSPIRONE HYDROCHLORIDE 10 MG: 10 TABLET ORAL at 17:09

## 2019-12-17 RX ADMIN — GABAPENTIN 300 MG: 300 CAPSULE ORAL at 17:08

## 2019-12-17 RX ADMIN — GABAPENTIN 300 MG: 300 CAPSULE ORAL at 14:21

## 2019-12-17 RX ADMIN — BUSPIRONE HYDROCHLORIDE 10 MG: 10 TABLET ORAL at 05:33

## 2019-12-17 RX ADMIN — PREGABALIN 300 MG: 100 CAPSULE ORAL at 17:08

## 2019-12-17 RX ADMIN — ZIPRASIDONE HYDROCHLORIDE 80 MG: 80 CAPSULE ORAL at 05:20

## 2019-12-17 RX ADMIN — INSULIN HUMAN 1 UNITS: 100 INJECTION, SOLUTION PARENTERAL at 08:06

## 2019-12-17 RX ADMIN — AMOXICILLIN AND CLAVULANATE POTASSIUM 1 TABLET: 875; 125 TABLET, FILM COATED ORAL at 05:20

## 2019-12-17 RX ADMIN — SODIUM BICARBONATE 650 MG: 650 TABLET ORAL at 17:08

## 2019-12-17 RX ADMIN — FAMOTIDINE 20 MG: 20 TABLET ORAL at 05:33

## 2019-12-17 RX ADMIN — DIPHENHYDRAMINE HYDROCHLORIDE 25 MG: 25 TABLET ORAL at 22:04

## 2019-12-17 RX ADMIN — ALBUTEROL SULFATE 2 PUFF: 90 AEROSOL, METERED RESPIRATORY (INHALATION) at 13:21

## 2019-12-17 RX ADMIN — PREDNISONE 10 MG: 10 TABLET ORAL at 17:09

## 2019-12-17 RX ADMIN — GABAPENTIN 300 MG: 300 CAPSULE ORAL at 22:04

## 2019-12-17 RX ADMIN — IBUPROFEN 800 MG: 800 TABLET, FILM COATED ORAL at 22:16

## 2019-12-17 RX ADMIN — AMOXICILLIN AND CLAVULANATE POTASSIUM 1 TABLET: 875; 125 TABLET, FILM COATED ORAL at 17:08

## 2019-12-17 RX ADMIN — FUROSEMIDE 80 MG: 40 TABLET ORAL at 05:34

## 2019-12-17 RX ADMIN — POTASSIUM CHLORIDE 20 MEQ: 20 TABLET, EXTENDED RELEASE ORAL at 05:20

## 2019-12-17 RX ADMIN — MECLIZINE HYDROCHLORIDE 12.5 MG: 25 TABLET ORAL at 05:19

## 2019-12-17 RX ADMIN — ZIPRASIDONE HYDROCHLORIDE 80 MG: 80 CAPSULE ORAL at 17:13

## 2019-12-17 ASSESSMENT — ENCOUNTER SYMPTOMS
BLURRED VISION: 0
TINGLING: 0
ORTHOPNEA: 0
PHOTOPHOBIA: 0
DIARRHEA: 0
HEADACHES: 0
ABDOMINAL PAIN: 0
HEMOPTYSIS: 0
HEARTBURN: 0
SORE THROAT: 0
MEMORY LOSS: 0
SENSORY CHANGE: 0
TREMORS: 0
DOUBLE VISION: 0
CONSTIPATION: 0
BACK PAIN: 1
BLOOD IN STOOL: 0
SHORTNESS OF BREATH: 0
NECK PAIN: 0
CHILLS: 0
DIZZINESS: 0
PALPITATIONS: 0
NERVOUS/ANXIOUS: 0
EYE PAIN: 0
FEVER: 0
COUGH: 0
SPEECH CHANGE: 0
WEAKNESS: 0
MYALGIAS: 1
CLAUDICATION: 0
STRIDOR: 0
VOMITING: 0
NAUSEA: 1
PND: 0
SPUTUM PRODUCTION: 0
DEPRESSION: 0

## 2019-12-17 ASSESSMENT — GAIT ASSESSMENTS: GAIT LEVEL OF ASSIST: UNABLE TO PARTICIPATE

## 2019-12-17 ASSESSMENT — COGNITIVE AND FUNCTIONAL STATUS - GENERAL
CLIMB 3 TO 5 STEPS WITH RAILING: TOTAL
WALKING IN HOSPITAL ROOM: A LOT
MOBILITY SCORE: 17
STANDING UP FROM CHAIR USING ARMS: A LITTLE
MOVING FROM LYING ON BACK TO SITTING ON SIDE OF FLAT BED: A LITTLE
SUGGESTED CMS G CODE MODIFIER MOBILITY: CK

## 2019-12-17 NOTE — ASSESSMENT & PLAN NOTE
12/24-12/29 - stable  - Previous Plans by Other Providers -  Per her report and she follows Dr. Wilkins.  12/22. Call Dr. Yoder tomorrow.

## 2019-12-17 NOTE — DISCHARGE PLANNING
[facility] Transitional Care Coordination     Referral from:  Dr. Harper  Facesheet indicates: Medicare.   Potential Rehab Diagnosis: Not applicable as pt DOES not have a dx to support IRF level of .      Physiatry consultation Denied per protocol.      Thank you for the referral.

## 2019-12-17 NOTE — THERAPY
"Physical Therapy Treatment completed.   Bed Mobility:  Supine to Sit: Supervised  Transfers: Sit to Stand: Supervised  Gait: Level Of Assist: Unable to Participate with Front-Wheel Walker       Plan of Care: Will benefit from Physical Therapy 3 times per week  Discharge Recommendations: Equipment: Will Continue to Assess for Equipment Needs. Post-acute therapy Recommend post-acute placement for continued physical therapy services prior to discharge home. Patient can tolerate post-acute therapies at a 5x/week frequency. (Pt appears to want post acute)       See \"Rehab Therapy-Acute\" Patient Summary Report for complete documentation.     Pt presenting w/ decreased and inconsistent functional mobility. Pt able to perform bed mobility w/out assist. She maintains balance EOB and performs seated exercises w/out LOB. Pt able to get to standing w/out assist. Once standing, pt demonstrates exaggerated tremulous activity in her LE's. Per RN, pt has been ambulating to the BR however w/ therapy, pt barely able to perform standing marching. Pt had multiple LOB to the R requiring MaxA to correct. Pt inconsistent w/ strength presentation. She was able to demonstrate full strength but not use it functionally. Pt is very perseverative on rehab and appeared to get upset w/ the possibility of going home. Pts deficits appear behavioral rather than physical. Pt might benefit from psych consult.  "

## 2019-12-17 NOTE — CARE PLAN
Problem: Safety  Goal: Will remain free from injury  Outcome: PROGRESSING AS EXPECTED  Goal: Will remain free from falls  Outcome: PROGRESSING AS EXPECTED     Problem: Discharge Barriers/Planning  Goal: Patient's continuum of care needs will be met  Outcome: PROGRESSING AS EXPECTED     Problem: Psychosocial Needs:  Goal: Level of anxiety will decrease  Outcome: PROGRESSING SLOWER THAN EXPECTED

## 2019-12-17 NOTE — PROGRESS NOTES
Report received from day shift RN. Pt resting in bed no signs of distress will continue to monitor.

## 2019-12-17 NOTE — PROGRESS NOTES
"Hospital Medicine Daily Progress Note    Date of Service  12/17/2019    Chief Complaint  29 y.o. female admitted 12/13/2019 with AMS    Hospital Course    per HPI  29 y.o. female who presented 12/13/2019 with altered level consciousness.  At this point in time, patient is mostly nonverbal, information obtained from her grandmother who was the one who found her.  She states whenever she left she was sleeping but just prior to this had been normal.  She states this morning she had some shortness of breath, she does have a history of asthma.  She did use breathing treatments and a home nurse helped with her breathing and it did seem to normalize.  Her grandmother left, was gone around 90 minutes, upon returning home she found her down, unconscious.  She did call 911.  She did attempt to wake up her granddaughter for quite some time and eventually she did wake up but seemed very groggy and confused.  She states she did just finish her antibiotics for her UTI/bronchitis, otherwise has been taking her usual home medications.  She has not been missing any medications nor taking extra of her medications.  I did discuss the case including labs and imaging with the ER physician.             Interval Problem Update  Patient is still complaining of mild nausea, having back spasms which is chronic, 7/10 in severity at times. Feels like she needs more time to feel better. Denies fevers/chills, denies dysuria or diarrhea.  12/17.   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 Newton, Neuro-Ophthalmology.  History of atrial fibrillation and cardiomyopathy? On Lasix, aspirin and ivadrabine. July 2019 echo: Normal left ventricular systolic function.  Left ventricular ejection fraction is visually estimated to be 65%.  Normal left ventricular wall " thickness.  Normal left atrial size.  Trace mitral regurgitation.  Structurally normal aortic valve without significant stenosis or   regurgitation.  Estimated right ventricular systolic pressure  is 30 mmHg.  Trace tricuspid regurgitation.  Normal right ventricular size.  Also chronic pain and neuropathy    Patient fell per nursing and now mentions she has intractable R hip pain and can't walk. Ordered Hip XR and that turned out to be normal, no arthritis.  Later she mentioned she choked on a piece of meat and is short of breath, She however speaks full sentences, not hypoxic, not wheezing.   She is on several pain medications and anti-inflammatories; sees Pain Clinic and Dr. Cabral, Neuroophthalmology. She mentions she is on low dose steroids prescribed to her by Dr. Wilkins.    Consultants/Specialty  None    Code Status  Full code    Disposition  PT/OT reevaluated.  Because of fall risk they recommend rehab or SNF.  Grandmother wanted her home however she is 79 years old and she did not argue when patient pointed that out.    Review of Systems  Review of Systems   Constitutional: Positive for malaise/fatigue. Negative for chills and fever.   HENT: Negative for congestion, hearing loss, sore throat and tinnitus.    Eyes: Negative for blurred vision, double vision, photophobia and pain.   Respiratory: Negative for cough, hemoptysis, sputum production, shortness of breath and stridor.    Cardiovascular: Negative for chest pain, palpitations, orthopnea, claudication and PND.   Gastrointestinal: Positive for nausea. Negative for abdominal pain, blood in stool, constipation, diarrhea, heartburn, melena and vomiting.   Genitourinary: Negative for dysuria, frequency and urgency.   Musculoskeletal: Positive for back pain (spasms) and myalgias. Negative for neck pain.   Neurological: Negative for dizziness, tingling, tremors, sensory change, speech change, weakness and headaches.   Psychiatric/Behavioral: Negative for  depression, memory loss and suicidal ideas. The patient is not nervous/anxious.    All other systems reviewed and are negative.       Physical Exam  Temp:  [36.1 °C (97 °F)-37 °C (98.6 °F)] 37 °C (98.6 °F)  Pulse:  [84-89] 88  Resp:  [16-20] 18  BP: (102-142)/(48-79) 112/67  SpO2:  [94 %-97 %] 94 %    Physical Exam  Vitals signs and nursing note reviewed.   Constitutional:       General: She is not in acute distress.     Appearance: Normal appearance. She is obese. She is not ill-appearing.   HENT:      Head: Normocephalic and atraumatic.      Right Ear: Tympanic membrane normal.      Left Ear: Tympanic membrane normal.      Nose: No congestion.      Mouth/Throat:      Mouth: Mucous membranes are dry.      Pharynx: Oropharynx is clear. No oropharyngeal exudate or posterior oropharyngeal erythema.   Eyes:      Extraocular Movements: Extraocular movements intact.      Conjunctiva/sclera: Conjunctivae normal.      Pupils: Pupils are equal, round, and reactive to light.   Neck:      Musculoskeletal: Normal range of motion and neck supple. No neck rigidity.   Cardiovascular:      Rate and Rhythm: Normal rate.      Pulses: Normal pulses.      Heart sounds: No murmur.   Pulmonary:      Effort: Pulmonary effort is normal. No respiratory distress.      Breath sounds: Normal breath sounds. No wheezing or rales.   Abdominal:      General: Abdomen is flat. Bowel sounds are normal. There is no distension.      Palpations: Abdomen is soft.      Tenderness: There is no tenderness. There is no guarding.   Musculoskeletal: Normal range of motion.         General: No swelling or tenderness.      Comments: Nonspecific myalgias, arthralgias out of proportion to exam   Skin:     General: Skin is warm and dry.      Capillary Refill: Capillary refill takes less than 2 seconds.      Coloration: Skin is not pale.   Neurological:      General: No focal deficit present.      Mental Status: She is alert and oriented to person, place, and time.  Mental status is at baseline.      Cranial Nerves: No cranial nerve deficit.   Psychiatric:      Comments: Very anxious         Fluids    Intake/Output Summary (Last 24 hours) at 12/17/2019 1506  Last data filed at 12/17/2019 0706  Gross per 24 hour   Intake 400 ml   Output 100 ml   Net 300 ml       Laboratory  Recent Labs     12/16/19  0556 12/17/19  0756   WBC 11.1* 5.9   RBC 4.80 4.18*   HEMOGLOBIN 14.9 12.8   HEMATOCRIT 43.7 38.8   MCV 91.0 92.8   MCH 31.0 30.6   MCHC 34.1 33.0*   RDW 42.7 43.4   PLATELETCT 202 154*   MPV 12.1 12.3     Recent Labs     12/16/19  0556 12/17/19  0756   SODIUM 136 135   POTASSIUM 3.6 3.5*   CHLORIDE 102 105   CO2 21 22   GLUCOSE 172* 175*   BUN 16 10   CREATININE 0.99 0.81   CALCIUM 9.3 8.6                   Imaging  DX-HIP-UNILATERAL-WITH PELVIS-1 VIEW RIGHT   Final Result      No acute osseous abnormality.      CT-HEAD W/O   Final Result         1.  No acute intracranial abnormality.   2.  Stable partial opacification of left mastoid air cells, consider effusion or component of mastoiditis as clinically appropriate      DX-CHEST-PORTABLE (1 VIEW)   Final Result      No evidence of acute cardiopulmonary process.           Assessment/Plan  * Altered level of consciousness- (present on admission)  Assessment & Plan  Highly suspect this is secondary to polypharmacy, patient is on a lot of sedating medications  She does have a history of accidental overdose in the past but denies this  No seizure activity noted, no evidence of rhabdomyolysis  Currently mentation has resolved, is AAOx4 past 2 days.  He had a long discussion in terms of decreasing her sedating medications.  12/17. No changes in her pain regimen and sedatives  Follow up to her Pain Clinic.    Morbidly obese (HCC)- (present on admission)  Assessment & Plan  Body mass index is 46.26 kg/m².  Encourage healthy lifestyle and physical activity.    History of optic neuritis  Assessment & Plan  Per her report and she follows   [facility].    ASTHMA  Assessment & Plan  Controlled on Symbicort, montelukast  Continue RT protocol, duo nebs, Pep therapy if warranted, and incentive spirometry.       Mastoiditis  Assessment & Plan  Continue with home dose of Prozac, Geodon and trazodone  CT head shows Stable partial opacification of left mastoid air cells, consider effusion or component of mastoiditis as clinically appropriate  Resume augmentin.    Immunocompromised (HCC)- (present on admission)  Assessment & Plan  On steroids and immunosuppressants for optic neuritis followed byu Dr. Newton, NeuroOphthalmology    Acquired hypothyroidism- (present on admission)  Assessment & Plan  Resume home dose of Synthroid  Last month her TSH was within normal limits    Type 2 diabetes mellitus with hyperglycemia, without long-term current use of insulin ([facility])- (present on admission)  Assessment & Plan  Continue Insulin-sliding scale, accu-checks and hypoglycemia protocol.  Continue with Consistent Carbohydrate diet   Hold home dose of trulicity     Depression- (present on admission)  Assessment & Plan  Resume home dose of Prozac    GERD (gastroesophageal reflux disease)- (present on admission)  Assessment & Plan  Continue with Pepcid    Borderline personality disorder in adult (HCC)- (present on admission)  Assessment & Plan  Continue with home dose of Prozac, Geodon and trazodone      Patient plan of care discussed at multidisplinary team rounds and with patient and R.N at [facility].      VTE prophylaxis: Lovenox     Reviewed vitals, labs, imaging, staff notes.  Discussed assessment and plan with Kristin Balderrama   Discussed with < RN and social work

## 2019-12-17 NOTE — RESPIRATORY CARE
RN called for PRN tx around 1455.  RN stated pt feels mucousy.  RN stated pt not in distress and already had rescue inhaler and not in distress.  RT informed RN at rapid, and can come assess pt at later time, since not emergent.  Arrived to pt up using restroom talking to their grandmother in no distress, and MAR shows that rescue inhaler has not been given since 1152am.  SVN not given since no indication.  PT very confused, cannot remember home tx regimen.  Pt already receiving maintenance inhalers and has PRN inhaler and nebulizer order.  No need to increase regimen at this time.

## 2019-12-17 NOTE — PROGRESS NOTES
"Pt had rough night. Up off and on throughout shift. Up to bathroom assist X1 with FWW. Exaggerated, unsteady gait, appears to be anxiety related. Pt frequently uses call light multiple times an hour. Complained of SOB, PRN respiratory meds administered. Pt anxious and demanding when roommate receiving care. Stated this morning \"I need to go home today. I've wanted to go home for days but I wasn't ready but I'm really going to talk them into it today.\" Encouraged self care, safety, and anxiety reduction throughout out. Will continue to monitor.   "

## 2019-12-17 NOTE — ASSESSMENT & PLAN NOTE
12/24-12/29 - stable  - Previous Plans by Other Providers -  On steroids and immunosuppressants for optic neuritis followed byrahel Newton, NeuroOphthalmology

## 2019-12-17 NOTE — FACE TO FACE
Face to Face Supporting Documentation - Home Health    The encounter with this patient was in whole or in part the primary reason for home health admission.    Date of encounter:   Patient:                    MRN:                       YOB: 2020  Kristin Balderrama  [id number]  1990     Home health to see patient for:  Skilled Nursing care for assessment, interventions & education, Physical Therapy evaluation and treatment and Occupational therapy evaluation and treatment    Skilled need for:  Medication Management med mgmt, need for PT/OT services    Skilled nursing interventions to include:  Comment: med megmt, recurrent falls?    Homebound status evidenced by:  Needs the assistance of another person in order to leave the home. Leaving home requires a considerable and taxing effort. There is a normal inability to leave the home.    Community Physician to provide follow up care: Torres Brody M.D.     Optional Interventions? No      I certify the face to face encounter for this home health care referral meets the CMS requirements and the encounter/clinical assessment with the patient was, in whole, or in part, for the medical condition(s) listed above, which is the primary reason for home health care. Based on my clinical findings: the service(s) are medically necessary, support the need for home health care, and the homebound criteria are met.  I certify that this patient has had a face to face encounter by myself.  Dmitriy Harper M.D. - NPI: [id number]

## 2019-12-18 LAB
GLUCOSE BLD-MCNC: 124 MG/DL (ref 65–99)
GLUCOSE BLD-MCNC: 147 MG/DL (ref 65–99)
GLUCOSE BLD-MCNC: 149 MG/DL (ref 65–99)
GLUCOSE BLD-MCNC: 176 MG/DL (ref 65–99)

## 2019-12-18 PROCEDURE — 82962 GLUCOSE BLOOD TEST: CPT

## 2019-12-18 PROCEDURE — 700102 HCHG RX REV CODE 250 W/ 637 OVERRIDE(OP): Performed by: INTERNAL MEDICINE

## 2019-12-18 PROCEDURE — A9270 NON-COVERED ITEM OR SERVICE: HCPCS | Performed by: INTERNAL MEDICINE

## 2019-12-18 PROCEDURE — 700111 HCHG RX REV CODE 636 W/ 250 OVERRIDE (IP): Performed by: INTERNAL MEDICINE

## 2019-12-18 PROCEDURE — 99222 1ST HOSP IP/OBS MODERATE 55: CPT | Mod: GC | Performed by: PSYCHIATRY & NEUROLOGY

## 2019-12-18 PROCEDURE — 700102 HCHG RX REV CODE 250 W/ 637 OVERRIDE(OP): Performed by: HOSPITALIST

## 2019-12-18 PROCEDURE — 94640 AIRWAY INHALATION TREATMENT: CPT

## 2019-12-18 PROCEDURE — 99233 SBSQ HOSP IP/OBS HIGH 50: CPT | Performed by: INTERNAL MEDICINE

## 2019-12-18 PROCEDURE — 94760 N-INVAS EAR/PLS OXIMETRY 1: CPT

## 2019-12-18 PROCEDURE — 700102 HCHG RX REV CODE 250 W/ 637 OVERRIDE(OP): Performed by: PSYCHIATRY & NEUROLOGY

## 2019-12-18 PROCEDURE — A9270 NON-COVERED ITEM OR SERVICE: HCPCS | Performed by: HOSPITALIST

## 2019-12-18 PROCEDURE — 700101 HCHG RX REV CODE 250: Performed by: INTERNAL MEDICINE

## 2019-12-18 PROCEDURE — 770006 HCHG ROOM/CARE - MED/SURG/GYN SEMI*

## 2019-12-18 PROCEDURE — 700111 HCHG RX REV CODE 636 W/ 250 OVERRIDE (IP): Performed by: NURSE PRACTITIONER

## 2019-12-18 PROCEDURE — A9270 NON-COVERED ITEM OR SERVICE: HCPCS | Performed by: PSYCHIATRY & NEUROLOGY

## 2019-12-18 RX ORDER — GABAPENTIN 100 MG/1
200 CAPSULE ORAL 4 TIMES DAILY
Status: DISCONTINUED | OUTPATIENT
Start: 2019-12-18 | End: 2019-12-31 | Stop reason: HOSPADM

## 2019-12-18 RX ORDER — TRAZODONE HYDROCHLORIDE 100 MG/1
50 TABLET ORAL EVERY EVENING
Status: DISCONTINUED | OUTPATIENT
Start: 2019-12-18 | End: 2019-12-31 | Stop reason: HOSPADM

## 2019-12-18 RX ORDER — MODAFINIL 100 MG/1
100 TABLET ORAL EVERY MORNING
Status: DISCONTINUED | OUTPATIENT
Start: 2019-12-19 | End: 2019-12-31 | Stop reason: HOSPADM

## 2019-12-18 RX ORDER — OXCARBAZEPINE 150 MG/1
150 TABLET, FILM COATED ORAL 2 TIMES DAILY
Status: DISCONTINUED | OUTPATIENT
Start: 2019-12-18 | End: 2019-12-31 | Stop reason: HOSPADM

## 2019-12-18 RX ADMIN — LEVOTHYROXINE SODIUM 75 MCG: 75 TABLET ORAL at 04:23

## 2019-12-18 RX ADMIN — ALBUTEROL SULFATE 2 PUFF: 90 AEROSOL, METERED RESPIRATORY (INHALATION) at 08:57

## 2019-12-18 RX ADMIN — OXCARBAZEPINE 150 MG: 150 TABLET, FILM COATED ORAL at 18:00

## 2019-12-18 RX ADMIN — BUDESONIDE AND FORMOTEROL FUMARATE DIHYDRATE 2 PUFF: 160; 4.5 AEROSOL RESPIRATORY (INHALATION) at 04:20

## 2019-12-18 RX ADMIN — BUSPIRONE HYDROCHLORIDE 10 MG: 10 TABLET ORAL at 17:39

## 2019-12-18 RX ADMIN — ZIPRASIDONE HYDROCHLORIDE 80 MG: 80 CAPSULE ORAL at 04:19

## 2019-12-18 RX ADMIN — BUSPIRONE HYDROCHLORIDE 10 MG: 10 TABLET ORAL at 04:21

## 2019-12-18 RX ADMIN — PREGABALIN 300 MG: 100 CAPSULE ORAL at 17:40

## 2019-12-18 RX ADMIN — MYCOPHENOLATE MOFETIL 1000 MG: 250 CAPSULE ORAL at 17:39

## 2019-12-18 RX ADMIN — ALBUTEROL SULFATE 2.5 MG: 2.5 SOLUTION RESPIRATORY (INHALATION) at 16:23

## 2019-12-18 RX ADMIN — POTASSIUM CHLORIDE 20 MEQ: 20 TABLET, EXTENDED RELEASE ORAL at 17:40

## 2019-12-18 RX ADMIN — ONDANSETRON 4 MG: 4 TABLET, ORALLY DISINTEGRATING ORAL at 04:53

## 2019-12-18 RX ADMIN — ZIPRASIDONE HYDROCHLORIDE 80 MG: 80 CAPSULE ORAL at 17:40

## 2019-12-18 RX ADMIN — BUTALBITAL, ACETAMINOPHEN, AND CAFFEINE 1 TABLET: 50; 325; 40 TABLET ORAL at 04:19

## 2019-12-18 RX ADMIN — AMOXICILLIN AND CLAVULANATE POTASSIUM 1 TABLET: 875; 125 TABLET, FILM COATED ORAL at 17:39

## 2019-12-18 RX ADMIN — FLUOXETINE HYDROCHLORIDE 40 MG: 20 CAPSULE ORAL at 04:22

## 2019-12-18 RX ADMIN — MYCOPHENOLATE MOFETIL 1000 MG: 250 CAPSULE ORAL at 04:20

## 2019-12-18 RX ADMIN — ALBUTEROL SULFATE 2 PUFF: 90 AEROSOL, METERED RESPIRATORY (INHALATION) at 04:02

## 2019-12-18 RX ADMIN — AMOXICILLIN AND CLAVULANATE POTASSIUM 1 TABLET: 875; 125 TABLET, FILM COATED ORAL at 04:19

## 2019-12-18 RX ADMIN — FAMOTIDINE 20 MG: 20 TABLET ORAL at 04:22

## 2019-12-18 RX ADMIN — TRAZODONE HYDROCHLORIDE 50 MG: 100 TABLET ORAL at 20:22

## 2019-12-18 RX ADMIN — SODIUM BICARBONATE 650 MG: 650 TABLET ORAL at 04:19

## 2019-12-18 RX ADMIN — ENOXAPARIN SODIUM 40 MG: 100 INJECTION SUBCUTANEOUS at 04:21

## 2019-12-18 RX ADMIN — PREDNISONE 10 MG: 10 TABLET ORAL at 04:23

## 2019-12-18 RX ADMIN — IBUPROFEN 800 MG: 800 TABLET, FILM COATED ORAL at 17:41

## 2019-12-18 RX ADMIN — GABAPENTIN 200 MG: 100 CAPSULE ORAL at 13:00

## 2019-12-18 RX ADMIN — GABAPENTIN 200 MG: 100 CAPSULE ORAL at 17:39

## 2019-12-18 RX ADMIN — DIPHENHYDRAMINE HYDROCHLORIDE 25 MG: 25 TABLET ORAL at 20:21

## 2019-12-18 RX ADMIN — FUROSEMIDE 80 MG: 40 TABLET ORAL at 04:22

## 2019-12-18 RX ADMIN — BUDESONIDE AND FORMOTEROL FUMARATE DIHYDRATE 2 PUFF: 160; 4.5 AEROSOL RESPIRATORY (INHALATION) at 18:00

## 2019-12-18 RX ADMIN — IBUPROFEN 800 MG: 800 TABLET, FILM COATED ORAL at 09:00

## 2019-12-18 RX ADMIN — SODIUM BICARBONATE 650 MG: 650 TABLET ORAL at 17:40

## 2019-12-18 RX ADMIN — ASPIRIN 81 MG: 81 TABLET, COATED ORAL at 04:18

## 2019-12-18 RX ADMIN — GABAPENTIN 300 MG: 300 CAPSULE ORAL at 09:00

## 2019-12-18 RX ADMIN — MECLIZINE HYDROCHLORIDE 12.5 MG: 25 TABLET ORAL at 12:49

## 2019-12-18 RX ADMIN — GABAPENTIN 200 MG: 100 CAPSULE ORAL at 20:21

## 2019-12-18 RX ADMIN — BUTALBITAL, ACETAMINOPHEN, AND CAFFEINE 1 TABLET: 50; 325; 40 TABLET ORAL at 12:45

## 2019-12-18 RX ADMIN — TIOTROPIUM BROMIDE 1 CAPSULE: 18 CAPSULE ORAL; RESPIRATORY (INHALATION) at 04:23

## 2019-12-18 RX ADMIN — PREGABALIN 300 MG: 100 CAPSULE ORAL at 04:18

## 2019-12-18 RX ADMIN — POTASSIUM CHLORIDE 20 MEQ: 20 TABLET, EXTENDED RELEASE ORAL at 04:18

## 2019-12-18 ASSESSMENT — ENCOUNTER SYMPTOMS
CONSTIPATION: 0
SHORTNESS OF BREATH: 0
BLURRED VISION: 0
STRIDOR: 0
TREMORS: 0
NAUSEA: 1
NERVOUS/ANXIOUS: 0
EYE PAIN: 0
PND: 0
HEMOPTYSIS: 0
BLOOD IN STOOL: 0
VOMITING: 0
HEADACHES: 0
NECK PAIN: 0
CHILLS: 0
SORE THROAT: 0
BACK PAIN: 1
PHOTOPHOBIA: 0
SPEECH CHANGE: 0
SPUTUM PRODUCTION: 0
SENSORY CHANGE: 0
HEARTBURN: 0
FEVER: 0
DIARRHEA: 0
MYALGIAS: 1
ABDOMINAL PAIN: 0
PALPITATIONS: 0
TINGLING: 0
DEPRESSION: 0
DOUBLE VISION: 0
CLAUDICATION: 0
DIZZINESS: 0
ORTHOPNEA: 0
MEMORY LOSS: 0
WEAKNESS: 0
COUGH: 0

## 2019-12-18 NOTE — CARE PLAN
Problem: Safety  Goal: Will remain free from injury  Outcome: PROGRESSING AS EXPECTED     Problem: Infection  Goal: Will remain free from infection  Outcome: PROGRESSING AS EXPECTED     Problem: Safety  Goal: Will remain free from falls  12/18/2019 0021 by Ignacai Way R.N.  Outcome: PROGRESSING SLOWER THAN EXPECTED  12/18/2019 0021 by Ignacia Way R.N.  Outcome: PROGRESSING AS EXPECTED     Problem: Psychosocial Needs:  Goal: Level of anxiety will decrease  Outcome: PROGRESSING SLOWER THAN EXPECTED

## 2019-12-18 NOTE — PROGRESS NOTES
Pt states that she choked on roast beef for lunch.  Pt threw up.  Oxygen saturation is % on room air.  MD notified.  Pt states that she was put on a pureed diet with thickened liquids prior hospitalization and was suppose to continue with that at home.  Pt states that her family refuses to thicken her liquids for her.  Pt put on pureed diet with nectar thicken liquids.  Will continue to monitor.

## 2019-12-18 NOTE — PROGRESS NOTES
Pt complained of heart palpitations, and  feeling clammy.  VSS.  Pt not clammy feeling/looking.  Dr. Harper made aware and and EKG STAT was ordered.

## 2019-12-18 NOTE — PROGRESS NOTES
"Pt was ambulating with this nurse to the bathroom with FWW. Pt had no issues throughout night with ambulation or complaints of pain. RN suggested that she try and work with therapy today 30 minutes earlier. While walking, pt stated that her hip was in severe pain as she walked, said \"I'm going down!\" and proceeded to kneel on to her left knee, then her right knee and sit on the ground with her legs out. Charge was notified. Pt was assisted back to the chair. No injury noted. Complaining of pain. Vitals stable. MD notified. Pt insisted she call mother, RN was in room during call. Will encourage safety, continue to monitor and report to oncoming RN.  "

## 2019-12-18 NOTE — PSYCHIATRY
Full note to follow.     Pt has a diagnosis of TM, plus functional symptoms from the past. Also has a dx of optic neuritis, TONYA, due to stimulator we can't do anymore MRIs which is very unfortunate. She is also very fused to the dependent/ sick role, which may be a partial response to depression and difficulty coping with symptoms, and has a diagnosis of schizoaffective disroder.     Recommend cross taper from gabapentin to trileptal.   Recommend adding modafinil 100mg daily for mood and for TONYA.   Unlikely to cause hallucinations, and she doesn't appear to be internally stimulated at this time.          None

## 2019-12-18 NOTE — PROGRESS NOTES
Assumed care of patient at 0700.    Received report from night RN.    Pt alert and oriented x 4.  Pt in chair with chair alarm on.  Pt states she fell asleep and fell out of the chair onto the floor on her right hip.  Pt complaining of right sided hip pain.  Pt's chair alarm was alarming and a RN found pt on the floor.  Pt able to get herself up back to the chair. Dr. Harper notified,. Xray of right hip ordered.  Pt's grandmother notified.  Charge RN notified.  Pharmacy notified.  Will continue to monitor.  Chair alarm on.  Hourly rounding implemented.

## 2019-12-19 LAB
GLUCOSE BLD-MCNC: 109 MG/DL (ref 65–99)
GLUCOSE BLD-MCNC: 122 MG/DL (ref 65–99)
GLUCOSE BLD-MCNC: 98 MG/DL (ref 65–99)

## 2019-12-19 PROCEDURE — 700102 HCHG RX REV CODE 250 W/ 637 OVERRIDE(OP): Performed by: INTERNAL MEDICINE

## 2019-12-19 PROCEDURE — 700111 HCHG RX REV CODE 636 W/ 250 OVERRIDE (IP): Performed by: INTERNAL MEDICINE

## 2019-12-19 PROCEDURE — 700102 HCHG RX REV CODE 250 W/ 637 OVERRIDE(OP): Performed by: HOSPITALIST

## 2019-12-19 PROCEDURE — 99233 SBSQ HOSP IP/OBS HIGH 50: CPT | Performed by: INTERNAL MEDICINE

## 2019-12-19 PROCEDURE — 700105 HCHG RX REV CODE 258: Performed by: INTERNAL MEDICINE

## 2019-12-19 PROCEDURE — 99222 1ST HOSP IP/OBS MODERATE 55: CPT | Performed by: NURSE PRACTITIONER

## 2019-12-19 PROCEDURE — A9270 NON-COVERED ITEM OR SERVICE: HCPCS | Performed by: HOSPITALIST

## 2019-12-19 PROCEDURE — A9270 NON-COVERED ITEM OR SERVICE: HCPCS | Performed by: INTERNAL MEDICINE

## 2019-12-19 PROCEDURE — 770006 HCHG ROOM/CARE - MED/SURG/GYN SEMI*

## 2019-12-19 PROCEDURE — 700102 HCHG RX REV CODE 250 W/ 637 OVERRIDE(OP): Performed by: PSYCHIATRY & NEUROLOGY

## 2019-12-19 PROCEDURE — 700111 HCHG RX REV CODE 636 W/ 250 OVERRIDE (IP): Performed by: NURSE PRACTITIONER

## 2019-12-19 PROCEDURE — 97110 THERAPEUTIC EXERCISES: CPT

## 2019-12-19 PROCEDURE — 82962 GLUCOSE BLOOD TEST: CPT

## 2019-12-19 PROCEDURE — A9270 NON-COVERED ITEM OR SERVICE: HCPCS | Performed by: PSYCHIATRY & NEUROLOGY

## 2019-12-19 PROCEDURE — 97530 THERAPEUTIC ACTIVITIES: CPT

## 2019-12-19 RX ADMIN — BUTALBITAL, ACETAMINOPHEN, AND CAFFEINE 1 TABLET: 50; 325; 40 TABLET ORAL at 14:53

## 2019-12-19 RX ADMIN — MECLIZINE HYDROCHLORIDE 12.5 MG: 25 TABLET ORAL at 18:03

## 2019-12-19 RX ADMIN — ZIPRASIDONE HYDROCHLORIDE 80 MG: 80 CAPSULE ORAL at 04:38

## 2019-12-19 RX ADMIN — IBUPROFEN 800 MG: 800 TABLET, FILM COATED ORAL at 18:05

## 2019-12-19 RX ADMIN — BUSPIRONE HYDROCHLORIDE 10 MG: 10 TABLET ORAL at 18:05

## 2019-12-19 RX ADMIN — SODIUM CHLORIDE 1000 MG: 9 INJECTION, SOLUTION INTRAVENOUS at 22:27

## 2019-12-19 RX ADMIN — OXCARBAZEPINE 150 MG: 150 TABLET, FILM COATED ORAL at 04:42

## 2019-12-19 RX ADMIN — ONDANSETRON 4 MG: 4 TABLET, ORALLY DISINTEGRATING ORAL at 18:02

## 2019-12-19 RX ADMIN — IBUPROFEN 800 MG: 800 TABLET, FILM COATED ORAL at 03:53

## 2019-12-19 RX ADMIN — BUSPIRONE HYDROCHLORIDE 10 MG: 10 TABLET ORAL at 04:35

## 2019-12-19 RX ADMIN — FAMOTIDINE 20 MG: 20 TABLET ORAL at 04:36

## 2019-12-19 RX ADMIN — SENNOSIDES AND DOCUSATE SODIUM 2 TABLET: 8.6; 5 TABLET ORAL at 04:42

## 2019-12-19 RX ADMIN — ALBUTEROL SULFATE 2 PUFF: 90 AEROSOL, METERED RESPIRATORY (INHALATION) at 13:32

## 2019-12-19 RX ADMIN — MECLIZINE HYDROCHLORIDE 12.5 MG: 25 TABLET ORAL at 08:01

## 2019-12-19 RX ADMIN — PREDNISONE 10 MG: 10 TABLET ORAL at 04:43

## 2019-12-19 RX ADMIN — MODAFINIL 100 MG: 100 TABLET ORAL at 05:04

## 2019-12-19 RX ADMIN — AMOXICILLIN AND CLAVULANATE POTASSIUM 1 TABLET: 875; 125 TABLET, FILM COATED ORAL at 18:04

## 2019-12-19 RX ADMIN — AMOXICILLIN AND CLAVULANATE POTASSIUM 1 TABLET: 875; 125 TABLET, FILM COATED ORAL at 04:39

## 2019-12-19 RX ADMIN — ACETAMINOPHEN 650 MG: 325 TABLET, FILM COATED ORAL at 11:23

## 2019-12-19 RX ADMIN — GABAPENTIN 200 MG: 100 CAPSULE ORAL at 18:17

## 2019-12-19 RX ADMIN — FUROSEMIDE 80 MG: 40 TABLET ORAL at 04:33

## 2019-12-19 RX ADMIN — GABAPENTIN 200 MG: 100 CAPSULE ORAL at 12:24

## 2019-12-19 RX ADMIN — TRAZODONE HYDROCHLORIDE 50 MG: 100 TABLET ORAL at 18:04

## 2019-12-19 RX ADMIN — MYCOPHENOLATE MOFETIL 1000 MG: 250 CAPSULE ORAL at 06:00

## 2019-12-19 RX ADMIN — ASPIRIN 81 MG: 81 TABLET, COATED ORAL at 04:36

## 2019-12-19 RX ADMIN — GABAPENTIN 200 MG: 100 CAPSULE ORAL at 22:10

## 2019-12-19 RX ADMIN — POTASSIUM CHLORIDE 20 MEQ: 20 TABLET, EXTENDED RELEASE ORAL at 18:05

## 2019-12-19 RX ADMIN — ONDANSETRON 4 MG: 4 TABLET, ORALLY DISINTEGRATING ORAL at 11:23

## 2019-12-19 RX ADMIN — PREGABALIN 300 MG: 100 CAPSULE ORAL at 18:03

## 2019-12-19 RX ADMIN — GABAPENTIN 200 MG: 100 CAPSULE ORAL at 08:01

## 2019-12-19 RX ADMIN — POTASSIUM CHLORIDE 20 MEQ: 20 TABLET, EXTENDED RELEASE ORAL at 04:43

## 2019-12-19 RX ADMIN — SODIUM BICARBONATE 650 MG: 650 TABLET ORAL at 04:40

## 2019-12-19 RX ADMIN — ACETAMINOPHEN 650 MG: 325 TABLET, FILM COATED ORAL at 18:02

## 2019-12-19 RX ADMIN — MYCOPHENOLATE MOFETIL 1000 MG: 250 CAPSULE ORAL at 21:48

## 2019-12-19 RX ADMIN — OXCARBAZEPINE 150 MG: 150 TABLET, FILM COATED ORAL at 18:02

## 2019-12-19 RX ADMIN — BUTALBITAL, ACETAMINOPHEN, AND CAFFEINE 1 TABLET: 50; 325; 40 TABLET ORAL at 08:45

## 2019-12-19 RX ADMIN — ACETAMINOPHEN 650 MG: 325 TABLET, FILM COATED ORAL at 02:19

## 2019-12-19 RX ADMIN — ZIPRASIDONE HYDROCHLORIDE 80 MG: 80 CAPSULE ORAL at 18:05

## 2019-12-19 RX ADMIN — ENOXAPARIN SODIUM 40 MG: 100 INJECTION SUBCUTANEOUS at 04:48

## 2019-12-19 RX ADMIN — SODIUM BICARBONATE 650 MG: 650 TABLET ORAL at 18:05

## 2019-12-19 RX ADMIN — PREGABALIN 300 MG: 100 CAPSULE ORAL at 04:32

## 2019-12-19 RX ADMIN — IBUPROFEN 800 MG: 800 TABLET, FILM COATED ORAL at 11:23

## 2019-12-19 RX ADMIN — TIOTROPIUM BROMIDE 1 CAPSULE: 18 CAPSULE ORAL; RESPIRATORY (INHALATION) at 04:51

## 2019-12-19 RX ADMIN — BUDESONIDE AND FORMOTEROL FUMARATE DIHYDRATE 2 PUFF: 160; 4.5 AEROSOL RESPIRATORY (INHALATION) at 04:46

## 2019-12-19 RX ADMIN — FLUOXETINE HYDROCHLORIDE 40 MG: 20 CAPSULE ORAL at 04:31

## 2019-12-19 RX ADMIN — BUDESONIDE AND FORMOTEROL FUMARATE DIHYDRATE 2 PUFF: 160; 4.5 AEROSOL RESPIRATORY (INHALATION) at 18:09

## 2019-12-19 RX ADMIN — LEVOTHYROXINE SODIUM 75 MCG: 75 TABLET ORAL at 04:39

## 2019-12-19 RX ADMIN — MECLIZINE HYDROCHLORIDE 12.5 MG: 25 TABLET ORAL at 14:53

## 2019-12-19 ASSESSMENT — ENCOUNTER SYMPTOMS
ABDOMINAL PAIN: 0
WEAKNESS: 0
HEMOPTYSIS: 0
NAUSEA: 1
HEADACHES: 0
TINGLING: 0
NECK PAIN: 0
BLOOD IN STOOL: 0
PND: 0
COUGH: 0
BACK PAIN: 1
SENSORY CHANGE: 0
CHILLS: 0
CLAUDICATION: 0
SPUTUM PRODUCTION: 0
MEMORY LOSS: 0
FEVER: 0
MYALGIAS: 1
CONSTIPATION: 0
SHORTNESS OF BREATH: 0
ORTHOPNEA: 0
HEARTBURN: 0
DIARRHEA: 0
STRIDOR: 0
TREMORS: 0
EYE PAIN: 0
DEPRESSION: 0
PHOTOPHOBIA: 0
PALPITATIONS: 0
VOMITING: 0
SORE THROAT: 0
BLURRED VISION: 0
SPEECH CHANGE: 0
DOUBLE VISION: 0
DIZZINESS: 0
NERVOUS/ANXIOUS: 0

## 2019-12-19 ASSESSMENT — COGNITIVE AND FUNCTIONAL STATUS - GENERAL
CLIMB 3 TO 5 STEPS WITH RAILING: TOTAL
MOVING FROM LYING ON BACK TO SITTING ON SIDE OF FLAT BED: A LOT
STANDING UP FROM CHAIR USING ARMS: A LITTLE
WALKING IN HOSPITAL ROOM: A LOT
MOBILITY SCORE: 16
SUGGESTED CMS G CODE MODIFIER MOBILITY: CK

## 2019-12-19 ASSESSMENT — GAIT ASSESSMENTS
DISTANCE (FEET): 15
GAIT LEVEL OF ASSIST: MODERATE ASSIST
ASSISTIVE DEVICE: FRONT WHEEL WALKER
DEVIATION: BRADYKINETIC;STEP TO;DECREASED BASE OF SUPPORT

## 2019-12-19 NOTE — CONSULTS
Chief Complaint   Patient presents with   • Syncope     found down by mother, states that she last saw her 30min prior.    • Dizziness       Problem List Items Addressed This Visit     Bilateral heel pain secondary to calcaneal bone spurs    Relevant Orders    REFERRAL TO PHYSIATRY (PMR)    REFERRAL TO SKILLED NURSING FACILITY    Morbidly obese (HCC)     Body mass index is 46.26 kg/m².  Encourage healthy lifestyle and physical activity.         Relevant Medications    sodium bicarbonate tablet 650 mg    insulin regular (HUMULIN R) injection 1-6 Units    glucose 4 g chewable tablet 16 g    modafinil (PROVIGIL) tablet 100 mg    Other Relevant Orders    REFERRAL TO PHYSIATRY (PMR)    Neurogenic bladder    Relevant Orders    REFERRAL TO PHYSIATRY (PMR)    REFERRAL TO SKILLED NURSING FACILITY    Knee pain, right    Relevant Orders    REFERRAL TO PHYSIATRY (PMR)    REFERRAL TO SKILLED NURSING FACILITY      Other Visit Diagnoses     Cardiac syncope        possible vs seizure    Relevant Medications    furosemide (LASIX) tablet 80 mg    enalaprilat (VASOTEC) injection 1.25 mg    enoxaparin (LOVENOX) inj 40 mg    Ivabradine HCl TABS 7.5 mg    Fall, initial encounter        Relevant Orders    REFERRAL TO PHYSIATRY (PMR)    Pain of right hip joint        Relevant Orders    REFERRAL TO PHYSIATRY (PMR)    Chronic pain syndrome          Neurology Consultation     History of present illness:  This is a 29-year old female with an extensive PMhx, most significant for schizophrenia, borderline personality, multiple personality disorder, depression, morbid obesity, osteo arthritis, multiple hospital presentations this year, and long-term disability for which a specific diagnosis has yet to be found (?functional transverse myelitis, ?optic neuritis) who again presented to [facility] on 12/15/19 for a chief complaint of fall from her wheelchair; patient was apparently found down on the ground unresponsive by her grandmother. Was  "reportedly confused for several hours following the event, event itself was however unwitnessed. Of note, patient presented here last month (November 2019) for a fall from her bed, at that time was presumed to have concussion and discharged home. Patient adds that over the last month, she feels that she has functionally declined; was previously able to ambulate, however now cannot without assistance.   Of note, patient has had EXTENSIVE work ups in the past with no specific diagnosis; though to have functional transverse myelitis (though note reflexes present) vs mitochondrial disease vs conversion disorder/functional. She is unable to undergo MRI secondary to a gastric stimulator, which patient reports was placed \"several year ago\" because she lost control of her bowel and bladder (also unexplained).     Neurology consulted by Dr. Dmitriy handy to further evaluate the above.     Past medical history:   Past Medical History:   Diagnosis Date   • Abdominal pain    • Anginal syndrome     random chest pain especially with tachycardia   • Apnea, sleep    • Arrhythmia     \"sinus tachycardia\", cariologist, Dr. Kumar; ablation 2/2016   • Arthritis     osteo   • ASTHMA     when around smoke   • Atrial fibrillation (HCC)    • Back pain    • Borderline personality disorder (HCC)    • Breath shortness     with tachycardia   • Bronchitis    • Cardiac arrhythmia    • Chickenpox    • Chronic UTI 9/18/2010   • Cough    • Daytime sleepiness    • Depression    • Diabetes (HCC)    • Diarrhea    • Disorder of thyroid    • Fall    • Fatigue    • Frequent headaches    • Gasping for breath    • Gynecological disorder     PCOS   • Headache(784.0)    • Heart burn    • History of falling    • Hypertension    • Indigestion    • Migraine    • 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HCC)    • 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HCC)    • Nausea    • Obesity    • Pain 08-15-12    back, 7/10   • Painful joint    • PCOS (polycystic ovarian syndrome)    • Pneumonia 2012 " "  • Psychosis (HCC)    • Renal disorder     \"kidney disease, stage 1\" nephrologist, Dr. 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   • Urinary bladder disorder     Suprapubic cath   • Urinary incontinence    • Weakness    • Wears glasses        Past surgical history:   Past Surgical History:   Procedure Laterality Date   • MUSCLE BIOPSY Right 1/26/2017    Procedure: MUSCLE BIOPSY - THIGH;  Surgeon: Isidro Vigil M.D.;  Location: SURGERY [facility];  Service:    • GASTROSCOPY WITH BALLOON DILATATION N/A 1/4/2017    Procedure: GASTROSCOPY WITH DILATATION;  Surgeon: Torres Vargas M.D.;  Location: [facility];  Service:    • BOWEL STIMULATOR INSERTION  7/13/2016    Procedure: BOWEL STIMULATOR INSERTION FOR PERMANENT INTERSTIM SACRAL IMPLANT;  Surgeon: Joe Noyola M.D.;  Location: SURGERY [facility];  Service:    • RECOVERY  1/27/2016    Procedure: CATH LAB EP STUDY WITH SINUS NODE MODIFICATION ABHINAV;  Surgeon: Recoveryonly Surgery;  Location: SURGERY PRE-POST PROC UNIT [facility];  Service:    • OTHER CARDIAC SURGERY  1/2016    cardiac ablation   • NEURO DEST FACET L/S W/IG SNGL  4/21/2015    Performed by Reza Tabor at [facility]   • LUMBAR FUSION ANTERIOR  8/21/2012    Performed by SUSIE SAWANT at [facility]   • ALYSSA BY LAPAROSCOPY  8/29/2010    Performed by SHAYY JOHNS at [facility] OR[facility]   • LAMINOTOMY     • OTHER ABDOMINAL SURGERY     • TONSILLECTOMY      tonsillectomy       Family history:   Family History   Problem Relation Age of Onset   • Hypertension Mother    • Sleep Apnea Mother    • Heart Disease Mother    • Other Mother         hypothryod   • Hypertension Maternal Uncle    • Heart Disease Maternal Grandmother    • " Hypertension Maternal Grandmother    • No Known Problems Sister    • Other Sister         Narcolepsy;fibromyalsia;bone;nerve   • Genitourinary () Problems Sister         endometriosis       Social history:   Social History     Socioeconomic History   • Marital status: Single     Spouse name: Not on file   • Number of children: Not on file   • Years of education: Not on file   • Highest education level: Not on file   Occupational History   • Not on file   Social Needs   • Financial resource strain: Not on file   • Food insecurity:     Worry: Not on file     Inability: Not on file   • Transportation needs:     Medical: Not on file     Non-medical: Not on file   Tobacco Use   • Smoking status: Never Smoker   • Smokeless tobacco: Never Used   Substance and Sexual Activity   • Alcohol use: No     Alcohol/week: 0.0 oz   • Drug use: Yes     Frequency: 7.0 times per week     Types: Marijuana, Oral     Comment: Medicinal edible's   • Sexual activity: Not Currently     Birth control/protection: Pill   Lifestyle   • Physical activity:     Days per week: Not on file     Minutes per session: Not on file   • Stress: Not on file   Relationships   • Social connections:     Talks on phone: Not on file     Gets together: Not on file     Attends [employer] service: Not on file     Active member of club or organization: Not on file     Attends meetings of clubs or organizations: Not on file     Relationship status: Not on file   • Intimate partner violence:     Fear of current or ex partner: Not on file     Emotionally abused: Not on file     Physically abused: Not on file     Forced sexual activity: Not on file   Other Topics Concern   • Not on file   Social History Narrative    ** Merged History Encounter **            Current medications:   Current Facility-Administered Medications   Medication Dose   • OXcarbazepine (TRILEPTAL) tablet 150 mg  150 mg   • gabapentin (NEURONTIN) capsule 200 mg  200 mg   • traZODone (DESYREL) tablet  50 mg  50 mg   • modafinil (PROVIGIL) tablet 100 mg  100 mg   • ibuprofen (MOTRIN) tablet 800 mg  800 mg   • predniSONE (DELTASONE) tablet 10 mg  10 mg   • pregabalin (LYRICA) capsule 300 mg  300 mg   • Ivabradine HCl TABS 7.5 mg  7.5 mg   • meclizine (ANTIVERT) tablet 12.5 mg  12.5 mg   • amoxicillin-clavulanate (AUGMENTIN) 875-125 MG per tablet 1 Tab  1 Tab   • albuterol inhaler 2 Puff  2 Puff   • albuterol (PROVENTIL) 2.5mg/0.5ml nebulizer solution 2.5 mg  2.5 mg   • enoxaparin (LOVENOX) inj 40 mg  40 mg   • aspirin EC (ECOTRIN) tablet 81 mg  81 mg   • budesonide-formoterol (SYMBICORT) 160-4.5 MCG/ACT inhaler 2 Puff  2 Puff   • busPIRone (BUSPAR) tablet 10 mg  10 mg   • FLUoxetine (PROZAC) capsule 40 mg  40 mg   • furosemide (LASIX) tablet 80 mg  80 mg   • levothyroxine (SYNTHROID) tablet 75 mcg  75 mcg   • mycophenolate (CELLCEPT) capsule 1,000 mg  1,000 mg   • potassium chloride SA (Kdur) tablet 20 mEq  20 mEq   • famotidine (PEPCID) tablet 20 mg  20 mg   • sodium bicarbonate tablet 650 mg  650 mg   • tiotropium (SPIRIVA) 18 MCG inhalation capsule 1 Cap  1 Cap   • ziprasidone (GEODON) capsule 80 mg  80 mg   • senna-docusate (PERICOLACE or SENOKOT S) 8.6-50 MG per tablet 2 Tab  2 Tab    And   • polyethylene glycol/lytes (MIRALAX) PACKET 1 Packet  1 Packet    And   • magnesium hydroxide (MILK OF MAGNESIA) suspension 30 mL  30 mL    And   • bisacodyl (DULCOLAX) suppository 10 mg  10 mg   • acetaminophen (TYLENOL) tablet 650 mg  650 mg   • enalaprilat (VASOTEC) injection 1.25 mg  1.25 mg   • ondansetron (ZOFRAN ODT) dispertab 4 mg  4 mg   • insulin regular (HUMULIN R) injection 1-6 Units  1-6 Units    And   • glucose 4 g chewable tablet 16 g  16 g    And   • DEXTROSE 10% BOLUS 250 mL  250 mL   • acetaminophen/caffeine/butalbital 325-40-50 mg (FIORICET) -40 MG per tablet 1 Tab  1 Tab   • diphenhydrAMINE (BENADRYL) tablet/capsule 25 mg  25 mg   • Respiratory Care per Protocol         Medication  "Allergy:  Allergies   Allergen Reactions   • Cefdinir Shortness of Breath and Itching     Tolerated 1/18/17  Tolerates ceftriaxone  Tolerated augmentin 8/2019    • Depakote [Divalproex Sodium] Unspecified     Muscle spasms/muscle pain and weakness     • Doxycycline Anaphylaxis and Vomiting     RXN=unknown   • Amitriptyline Unspecified     Headaches     • Aripiprazole [Abilify] Unspecified     Headaches/muscle twitching     • Clindamycin Nausea     Even with food     • Ees [Erythromycin] Vomiting and Nausea   • Flagyl [Metronidazole Hcl] Unspecified     \"eye problems\"     • Flomax [Tamsulosin Hydrochloride] Swelling   • Levaquin Unspecified     Severe muscle cramps in legs  RXN=unknown   • Metformin Unspecified     Increased lactic acid      • Tape Rash     Tears skin off  coban with Tegaderm tape ok intermittently  RXN=ongoing   • Vancomycin Itching     Pt becomes flushed in face and chest.   RXN=7/10/16   • Wound Dressing Adhesive Hives     By pt report   • Cephalexin [Keflex] Rash     Pt states she gets a rash with this medication  Tolerates ceftriaxone       Review of systems:   As noted extensively above; otherwise all systems reviewed and determined to be negative/non contributory.     Physical examination:   Vitals:    12/18/19 1628 12/18/19 2000 12/19/19 0400 12/19/19 1200   BP:  119/67 143/89 129/63   Pulse: 96 77 73 76   Resp: 16 16 16 20   Temp:  36.9 °C (98.5 °F) 36.9 °C (98.5 °F) 36.4 °C (97.5 °F)   TempSrc:  Temporal Temporal Temporal   SpO2: 98% 92% 92% 93%   Weight:       Height:         General: Patient in no acute distress, pleasant and cooperative.  HEENT: Normocephalic, no signs of acute trauma.   Neck: supple, no meningeal signs or carotid bruits. There is normal range of motion. No tenderness on exam.   Chest: clear to auscultation. No cough.   CV: RRR, no murmurs.   Skin: no signs of acute rashes or trauma.   Musculoskeletal: joints exhibit full range of motion, without any pain to palpation. " There are no signs of joint or muscle swelling. There is no tenderness to deep palpation of muscles.   Psychiatric: No hallucinatory behavior. Denies symptoms of depression or suicidal ideation. Mood and affect appear normal on exam.     NEUROLOGICAL EXAM:   Mental status, orientation: Awake, alert and fully oriented.   Speech and language: speech is clear and fluent. The patient is able to name, repeat and comprehend.   Cranial nerve exam: Pupils are 3-4 mm bilaterally and equally reactive to light. Visual fields are intact by confrontation. There is no nystagmus on primary or secondary gaze. Intact full EOM in all directions of gaze. Face appears symmetric. Sensation in the face is intact to light touch. Tongue is midline and without any signs of tongue biting or fasciculations.Shoulder shrug is intact bilaterally.   Motor exam: Strength is 5/5 in all extremities; only with mild distal BLE weakness/weak dorsiflexion bilaterally. Tone is normal, no obvious rigidity or flaccidity. No abnormal movements were seen on exam.   Sensory exam reveals normal sense of light touch and pinprick in all extremities.   Deep tendon reflexes:  2+ to 3 throughout; only with decreased ankle reflexes bilaterally.. Plantar responses are flexor BL. There is no clonus.   Coordination: shows a normal finger-nose-finger. Normal rapidly alternating movements.   Gait: Not assessed as patient is unable.       ANCILLARY DATA REVIEWED:     Lab Data Review:  Recent Results (from the past 24 hour(s))   ACCU-CHEK GLUCOSE    Collection Time: 12/18/19  6:49 PM   Result Value Ref Range    Glucose - Accu-Ck 176 (H) 65 - 99 mg/dL   ACCU-CHEK GLUCOSE    Collection Time: 12/18/19  8:22 PM   Result Value Ref Range    Glucose - Accu-Ck 149 (H) 65 - 99 mg/dL   ACCU-CHEK GLUCOSE    Collection Time: 12/19/19  7:48 AM   Result Value Ref Range    Glucose - Accu-Ck 98 65 - 99 mg/dL   ACCU-CHEK GLUCOSE    Collection Time: 12/19/19 11:22 AM   Result Value Ref  Range    Glucose - Accu-Ck 122 (H) 65 - 99 mg/dL       Labs reviewed by me.     Records reviewed:   Reviewed records from many previous hospital presentations and outpatient visits.       Imaging reviewed by me:     DX-HIP-UNILATERAL-WITH PELVIS-1 VIEW RIGHT   Final Result      No acute osseous abnormality.      CT-HEAD W/O   Final Result         1.  No acute intracranial abnormality.   2.  Stable partial opacification of left mastoid air cells, consider effusion or component of mastoiditis as clinically appropriate      DX-CHEST-PORTABLE (1 VIEW)   Final Result      No evidence of acute cardiopulmonary process.          ASSESSMENT AND PLAN:  29-year old female with an extensive PMhx, most significant for schizophrenia, borderline personality, multiple personality disorder, depression, morbid obesity, osteo arthritis, multiple hospital presentations this year (with over 20 Neurology encounters over the past 2 years) and long-term disability for which a specific diagnosis has yet to be found (?functional transverse myelitis, ?optic neuritis, functional/conversion disorder) who again presented to [facility] on 12/15/19 for a chief complaint of fall from her wheelchair; found unresponsive; now awake and alert. CT head at time of presentation unremarkable. During interview, patient admits to recent functional decline, increasing falls, and now inability to ambulate.     As was noted above, patient has what appears to be a fluctuating, long-term disability; she has had extensive neurological work up over the past several years-- including EMG/NCS,  PET scans, CT myelogram, CSF studies, etc.-- that has been ultimately unrevealing; would benefit from MRI however cannot undergo MRI secondary to an implanted GI simulator (that was apparently placed for unexplained loss of bladder/bowel function). Per review of Dr. Fox's notes, she did test positive for microsomal antibodies; She was also recommended for  mitochondrial testing, however has been unable to do so secondary to cost.    Per my examination of the patient while laying in bed, she has normal reflexes (despite her large body habitus), only objective finding was impaired dorsiflexion bilaterally, however have concern for (at least partially) functional component as she has give-way weakness on exam as well.     Our neurology service was again called to give further recommendations regarding her work-up; at this late juncture, feel that study such as CT myelogram will not change any management plans for the patient and feel that it will likely be low yield. Will defer decision to pursue myelogram to primary team. Also per Dr. Fox's notes, patient has benefited from IV solumedrol in the past; may consider 1-3 day course of IV solumedrol to assess for improvement/functional improvement.     Would also recommend medication reconciliation, as patient is on numerous psychotropics, analgesics, muscle relaxants, neuropathy medications, and others that may certainly be contributing/related to her falls and functional decline. Recommend PT/OT, physiatry for rehab. Psychiatry is following the patient as well, appreciate recs and input.    The above has been discussed with Dr. Rincon. Please call with further questions.     Cande Jennings MSN, BSN, AGNP-C  [facility]

## 2019-12-19 NOTE — DISCHARGE PLANNING
Received Choice form at 2610  Agency/Facility Name: [facility], [facility], [facility], [facility].  Referral sent per Choice form @ 3504

## 2019-12-19 NOTE — PROGRESS NOTES
Bedside report received, pt care assumed, tele box on.  Pt is A&Ox4, resting in bed, and denies any additional needs at this time. Bed in lowest position and call light within reach.   Dr Carballo

## 2019-12-19 NOTE — CARE PLAN
Problem: Safety  Goal: Will remain free from injury  Outcome: PROGRESSING AS EXPECTED  Note:   Bed alarm on, educated on fall risk     Problem: Communication  Goal: The ability to communicate needs accurately and effectively will improve  Outcome: PROGRESSING AS EXPECTED  Note:   Able to voice needs clearly
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
## 2019-12-19 NOTE — PROGRESS NOTES
Bilateral bruising noted on Pt.'s abdomen. Pt. Reports that the area is tender. 2+ edema in bilateral lower extremities.

## 2019-12-19 NOTE — DISCHARGE PLANNING
Anticipated Discharge Disposition:   SNF vs [facility]    Action:   RN CM met with patient to complete assessment as well as discussion of discharge planning.  Patient is requesting intense therapies at rehab.  SNF choices made; while discussing options, patient requested to be sent to [facility].  RN CM and patient reviewed choice forms for SNF as well as acute rehab.      Patient requested information on Advance Directive.  RN CM provided advance directive to patient.  RN CM to contact palliative care for assistance with advance directive.  Patient also wants to discuss DNR options.  RN CM to request palliative referral from physician.    Barriers to Discharge:   Medical clearance  SNF vs Acute Rehab acceptance    Plan:   RN CM faxed choice forms to [facility].  RN CM provided advance directive to patient as requested.  RN CM to request order for palliative care to meet with patient as well as patient's grandmother.

## 2019-12-19 NOTE — PROGRESS NOTES
"Hospital Medicine Daily Progress Note    Date of Service  12/19/2019    Chief Complaint  29 y.o. female admitted 12/13/2019 with AMS    Hospital Course    per HPI  29 y.o. female who presented 12/13/2019 with altered level consciousness.  At this point in time, patient is mostly nonverbal, information obtained from her grandmother who was the one who found her.  She states whenever she left she was sleeping but just prior to this had been normal.  She states this morning she had some shortness of breath, she does have a history of asthma.  She did use breathing treatments and a home nurse helped with her breathing and it did seem to normalize.  Her grandmother left, was gone around 90 minutes, upon returning home she found her down, unconscious.  She did call 911.  She did attempt to wake up her granddaughter for quite some time and eventually she did wake up but seemed very groggy and confused.  She states she did just finish her antibiotics for her UTI/bronchitis, otherwise has been taking her usual home medications.  She has not been missing any medications nor taking extra of her medications.  I did discuss the case including labs and imaging with the ER physician.             Interval Problem Update  Patient is still complaining of mild nausea, having back spasms which is chronic, 7/10 in severity at times. Feels like she needs more time to feel better. Denies fevers/chills, denies dysuria or diarrhea.  12/17.   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 Newton, Neuro-Ophthalmology.  History of atrial fibrillation and cardiomyopathy? On Lasix, aspirin and ivadrabine. July 2019 echo: Normal left ventricular systolic function.  Left ventricular ejection fraction is visually estimated to be 65%.  Normal left ventricular wall " "thickness.  Normal left atrial size.  Trace mitral regurgitation.  Structurally normal aortic valve without significant stenosis or   regurgitation.  Estimated right ventricular systolic pressure  is 30 mmHg.  Trace tricuspid regurgitation.  Normal right ventricular size.  Also chronic pain and neuropathy  Patient fell per nursing and now mentions she has intractable R hip pain and can't walk. Ordered Hip XR and that turned out to be normal, no arthritis.  Later she mentioned she choked on a piece of meat and is short of breath, She however speaks full sentences, not hypoxic, not wheezing.   She is on several pain medications and anti-inflammatories; sees Pain Clinic and Dr. Cabral, Neuroophthalmology. She mentions she is on low dose steroids prescribed to her by Dr. Wilkins.  12/18. Patient \"fell to knees\" again.  Discussed with Dr. Sabillon in depth.  12/19. Unchanged nonspecific complaints of pain and weakness.  I called dr. Chowdary, who is following her for optic neuritis.  I then called Dr. Rincon, and Cande Jennings, Neurology    Consultants/Specialty  Psychiatry    Code Status  Full code    Disposition  PT/OT reevaluated.  Because of fall risk they recommend rehab or SNF.  Grandmother wanted her home however she is 80 years old and she did not argue when patient pointed that out.  12/18. Plan for rehab or SNF    Review of Systems  Review of Systems   Constitutional: Positive for malaise/fatigue. Negative for chills and fever.   HENT: Negative for congestion, hearing loss, sore throat and tinnitus.    Eyes: Negative for blurred vision, double vision, photophobia and pain.   Respiratory: Negative for cough, hemoptysis, sputum production, shortness of breath and stridor.    Cardiovascular: Negative for chest pain, palpitations, orthopnea, claudication and PND.   Gastrointestinal: Positive for nausea. Negative for abdominal pain, blood in stool, constipation, diarrhea, heartburn, melena and vomiting. "   Genitourinary: Negative for dysuria, frequency and urgency.   Musculoskeletal: Positive for back pain (spasms) and myalgias. Negative for neck pain.   Neurological: Negative for dizziness, tingling, tremors, sensory change, speech change, weakness and headaches.   Psychiatric/Behavioral: Negative for depression, memory loss and suicidal ideas. The patient is not nervous/anxious.    All other systems reviewed and are negative.       Physical Exam  Temp:  [36.2 °C (97.1 °F)-36.9 °C (98.5 °F)] 36.4 °C (97.5 °F)  Pulse:  [73-96] 76  Resp:  [16-20] 20  BP: (119-143)/(63-89) 129/63  SpO2:  [92 %-98 %] 93 %    Physical Exam  Vitals signs and nursing note reviewed.   Constitutional:       General: She is not in acute distress.     Appearance: Normal appearance. She is obese. She is not ill-appearing.   HENT:      Head: Normocephalic and atraumatic.      Right Ear: Tympanic membrane normal.      Left Ear: Tympanic membrane normal.      Nose: No congestion.      Mouth/Throat:      Mouth: Mucous membranes are dry.      Pharynx: Oropharynx is clear. No oropharyngeal exudate or posterior oropharyngeal erythema.   Eyes:      Extraocular Movements: Extraocular movements intact.      Conjunctiva/sclera: Conjunctivae normal.      Pupils: Pupils are equal, round, and reactive to light.   Neck:      Musculoskeletal: Normal range of motion and neck supple. No neck rigidity.   Cardiovascular:      Rate and Rhythm: Normal rate.      Pulses: Normal pulses.      Heart sounds: No murmur.   Pulmonary:      Effort: Pulmonary effort is normal. No respiratory distress.      Breath sounds: Normal breath sounds. No wheezing or rales.   Abdominal:      General: Abdomen is flat. Bowel sounds are normal. There is no distension.      Palpations: Abdomen is soft.      Tenderness: There is no tenderness. There is no guarding.   Musculoskeletal: Normal range of motion.         General: Tenderness (nonspecific myalgias) present. No swelling.       Comments: Nonspecific myalgias, arthralgias out of proportion to exam   Skin:     General: Skin is warm and dry.      Capillary Refill: Capillary refill takes less than 2 seconds.      Coloration: Skin is not pale.   Neurological:      General: No focal deficit present.      Mental Status: She is alert and oriented to person, place, and time. Mental status is at baseline.      Cranial Nerves: No cranial nerve deficit.      Comments: Odd insight at times   Psychiatric:      Comments: Anxious and needy behavior  Mentioned she has auditory hallucinations         Fluids    Intake/Output Summary (Last 24 hours) at 12/19/2019 1447  Last data filed at 12/19/2019 0900  Gross per 24 hour   Intake 640 ml   Output 1 ml   Net 639 ml       Laboratory  Recent Labs     12/17/19  0756   WBC 5.9   RBC 4.18*   HEMOGLOBIN 12.8   HEMATOCRIT 38.8   MCV 92.8   MCH 30.6   MCHC 33.0*   RDW 43.4   PLATELETCT 154*   MPV 12.3     Recent Labs     12/17/19  0756   SODIUM 135   POTASSIUM 3.5*   CHLORIDE 105   CO2 22   GLUCOSE 175*   BUN 10   CREATININE 0.81   CALCIUM 8.6                   Imaging  DX-HIP-UNILATERAL-WITH PELVIS-1 VIEW RIGHT   Final Result      No acute osseous abnormality.      CT-HEAD W/O   Final Result         1.  No acute intracranial abnormality.   2.  Stable partial opacification of left mastoid air cells, consider effusion or component of mastoiditis as clinically appropriate      DX-CHEST-PORTABLE (1 VIEW)   Final Result      No evidence of acute cardiopulmonary process.           Assessment/Plan  * Altered level of consciousness- (present on admission)  Assessment & Plan  Highly suspect this is secondary to polypharmacy, patient is on a lot of sedating medications  She does have a history of accidental overdose in the past but denies this  No seizure activity noted, no evidence of rhabdomyolysis  Currently mentation has resolved, is AAOx4 past 2 days.  He had a long discussion in terms of decreasing her sedating  "medications.  12/17. No changes in her pain regimen and sedatives  Follow up to her Pain Clinic.  12/18. Discussed with Dr. Sabillon  Not very clear diagnoses but managed for functional transverse myelitis, possible optic neuritis, seen by Dr. Newton  Spine stimulator limits further evaluation for other causes of enceiphalopathy/weakness/falls such as MRIs.  Taper of gabapentin and transition to Trileptal  Modenalfil also started  Monitor her symptoms of weakness causing her to fall  12/19. May have a functional component but Dr. Chowdary feels there could be neurologic disease  He recommended formal neurology consult to r/o transverse myelitis or CNS lesions and see if it is acceptable to get a CT myelogram since MRI is contraindicated due to her gastric pacer/stimulator  Discussed with Neurology.   Has had CT myelogram in the past per their records and seen Dr. Fox. Has been nondiagnostic.  Currently no other possible myelopathy diagnosis.  Symptomatology also inconsistent as she has reflexes  She has been worked up for myopathies; on Dr. Fox's notes in the past, muscle biopsy was nondiagnostic.  In the past IV Solumedrol was given and per Dr. Fox, shpowed some benefit for a diagnosis of \"paralysis\" and \"lower motor neuron etiology\". This was 2 years ago however and recently she has been experiencing weakness and falls again.  Currently agree that no need for CT myelogram right now, will evaluate if high dose steroids will be of any benefit otherwise she will have to follow-up with Dr. Fox.  Ordered 3d course of Solu-medrol to see if there is any benefit.    Morbidly obese (HCC)- (present on admission)  Assessment & Plan  Body mass index is 46.26 kg/m².  Encourage healthy lifestyle and physical activity.    History of optic neuritis  Assessment & Plan  Per her report and she follows Dr. Wilkins.    ASTHMA  Assessment & Plan  Controlled on Symbicort, montelukast  Continue RT protocol, duo nebs, " Pep therapy if warranted, and incentive spirometry.       Mastoiditis  Assessment & Plan  CT head shows Stable partial opacification of left mastoid air cells, consider effusion or component of mastoiditis as clinically appropriate  Resume augmentin.    Immunocompromised ([facility])- (present on admission)  Assessment & Plan  On steroids and immunosuppressants for optic neuritis followed byu Dr. Newton, NeuroOphthalmology    Acquired hypothyroidism- (present on admission)  Assessment & Plan  Resume home dose of Synthroid  Last month her TSH was within normal limits    Type 2 diabetes mellitus with hyperglycemia, without long-term current use of insulin ([facility])- (present on admission)  Assessment & Plan  Continue Insulin-sliding scale, accu-checks and hypoglycemia protocol.  Continue with Consistent Carbohydrate diet   Hold home dose of trulicity     Depression- (present on admission)  Assessment & Plan  Resume home dose of Prozac    GERD (gastroesophageal reflux disease)- (present on admission)  Assessment & Plan  Continue with Pepcid    Borderline personality disorder in adult ([facility])- (present on admission)  Assessment & Plan  Continue with home dose of Prozac, Geodon and trazodone      Patient plan of care discussed at multidisplinary team rounds and with patient and R.N at [facility].      VTE prophylaxis: Lovenox     Reviewed vitals, labs, imaging, staff notes.  Discussed assessment and plan with Kristin Balderrama   Discussed with RN and social work  Discussed with Psychiatry  Discussed with Neurology

## 2019-12-19 NOTE — DISCHARGE PLANNING
Agency/Facility Name: [facility]  Spoke To: Sun  Outcome: Patient declined- care exceeds capacity.

## 2019-12-19 NOTE — PROGRESS NOTES
"Hospital Medicine Daily Progress Note    Date of Service  12/18/2019    Chief Complaint  29 y.o. female admitted 12/13/2019 with AMS    Hospital Course    per HPI  29 y.o. female who presented 12/13/2019 with altered level consciousness.  At this point in time, patient is mostly nonverbal, information obtained from her grandmother who was the one who found her.  She states whenever she left she was sleeping but just prior to this had been normal.  She states this morning she had some shortness of breath, she does have a history of asthma.  She did use breathing treatments and a home nurse helped with her breathing and it did seem to normalize.  Her grandmother left, was gone around 90 minutes, upon returning home she found her down, unconscious.  She did call 911.  She did attempt to wake up her granddaughter for quite some time and eventually she did wake up but seemed very groggy and confused.  She states she did just finish her antibiotics for her UTI/bronchitis, otherwise has been taking her usual home medications.  She has not been missing any medications nor taking extra of her medications.  I did discuss the case including labs and imaging with the ER physician.             Interval Problem Update  Patient is still complaining of mild nausea, having back spasms which is chronic, 7/10 in severity at times. Feels like she needs more time to feel better. Denies fevers/chills, denies dysuria or diarrhea.  12/17.   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 Newton, Neuro-Ophthalmology.  History of atrial fibrillation and cardiomyopathy? On Lasix, aspirin and ivadrabine. July 2019 echo: Normal left ventricular systolic function.  Left ventricular ejection fraction is visually estimated to be 65%.  Normal left ventricular wall " "thickness.  Normal left atrial size.  Trace mitral regurgitation.  Structurally normal aortic valve without significant stenosis or   regurgitation.  Estimated right ventricular systolic pressure  is 30 mmHg.  Trace tricuspid regurgitation.  Normal right ventricular size.  Also chronic pain and neuropathy  Patient fell per nursing and now mentions she has intractable R hip pain and can't walk. Ordered Hip XR and that turned out to be normal, no arthritis.  Later she mentioned she choked on a piece of meat and is short of breath, She however speaks full sentences, not hypoxic, not wheezing.   She is on several pain medications and anti-inflammatories; sees Pain Clinic and Dr. Cabral, Neuroophthalmology. She mentions she is on low dose steroids prescribed to her by Dr. Wilkins.  12/18. Patient \"fell to knees\" again.  Discussed with Dr. Sabillon in depth.    Consultants/Specialty  Psychiatry    Code Status  Full code    Disposition  PT/OT reevaluated.  Because of fall risk they recommend rehab or SNF.  Grandmother wanted her home however she is 80 years old and she did not argue when patient pointed that out.  12/18. Plan for rehab or SNF    Review of Systems  Review of Systems   Constitutional: Positive for malaise/fatigue. Negative for chills and fever.   HENT: Negative for congestion, hearing loss, sore throat and tinnitus.    Eyes: Negative for blurred vision, double vision, photophobia and pain.   Respiratory: Negative for cough, hemoptysis, sputum production, shortness of breath and stridor.    Cardiovascular: Negative for chest pain, palpitations, orthopnea, claudication and PND.   Gastrointestinal: Positive for nausea. Negative for abdominal pain, blood in stool, constipation, diarrhea, heartburn, melena and vomiting.   Genitourinary: Negative for dysuria, frequency and urgency.   Musculoskeletal: Positive for back pain (spasms) and myalgias. Negative for neck pain.   Neurological: Negative for dizziness, " tingling, tremors, sensory change, speech change, weakness and headaches.   Psychiatric/Behavioral: Negative for depression, memory loss and suicidal ideas. The patient is not nervous/anxious.    All other systems reviewed and are negative.       Physical Exam  Temp:  [36.2 °C (97.1 °F)-36.7 °C (98 °F)] 36.2 °C (97.1 °F)  Pulse:  [75-96] 96  Resp:  [16-20] 16  BP: (109-142)/(52-85) 142/85  SpO2:  [90 %-98 %] 98 %    Physical Exam  Vitals signs and nursing note reviewed.   Constitutional:       General: She is not in acute distress.     Appearance: Normal appearance. She is obese. She is not ill-appearing.   HENT:      Head: Normocephalic and atraumatic.      Right Ear: Tympanic membrane normal.      Left Ear: Tympanic membrane normal.      Nose: No congestion.      Mouth/Throat:      Mouth: Mucous membranes are dry.      Pharynx: Oropharynx is clear. No oropharyngeal exudate or posterior oropharyngeal erythema.   Eyes:      Extraocular Movements: Extraocular movements intact.      Conjunctiva/sclera: Conjunctivae normal.      Pupils: Pupils are equal, round, and reactive to light.   Neck:      Musculoskeletal: Normal range of motion and neck supple. No neck rigidity.   Cardiovascular:      Rate and Rhythm: Normal rate.      Pulses: Normal pulses.      Heart sounds: No murmur.   Pulmonary:      Effort: Pulmonary effort is normal. No respiratory distress.      Breath sounds: Normal breath sounds. No wheezing or rales.   Abdominal:      General: Abdomen is flat. Bowel sounds are normal. There is no distension.      Palpations: Abdomen is soft.      Tenderness: There is no tenderness. There is no guarding.   Musculoskeletal: Normal range of motion.         General: No swelling or tenderness.      Comments: Nonspecific myalgias, arthralgias out of proportion to exam   Skin:     General: Skin is warm and dry.      Capillary Refill: Capillary refill takes less than 2 seconds.      Coloration: Skin is not pale.    Neurological:      General: No focal deficit present.      Mental Status: She is alert and oriented to person, place, and time. Mental status is at baseline.      Cranial Nerves: No cranial nerve deficit.      Comments: Odd insight at times   Psychiatric:      Comments: Anxious and needy behavior  Mentioned she has auditory hallucinations         Fluids  No intake or output data in the 24 hours ending 12/18/19 1732    Laboratory  Recent Labs     12/16/19  0556 12/17/19  0756   WBC 11.1* 5.9   RBC 4.80 4.18*   HEMOGLOBIN 14.9 12.8   HEMATOCRIT 43.7 38.8   MCV 91.0 92.8   MCH 31.0 30.6   MCHC 34.1 33.0*   RDW 42.7 43.4   PLATELETCT 202 154*   MPV 12.1 12.3     Recent Labs     12/16/19  0556 12/17/19  0756   SODIUM 136 135   POTASSIUM 3.6 3.5*   CHLORIDE 102 105   CO2 21 22   GLUCOSE 172* 175*   BUN 16 10   CREATININE 0.99 0.81   CALCIUM 9.3 8.6                   Imaging  DX-HIP-UNILATERAL-WITH PELVIS-1 VIEW RIGHT   Final Result      No acute osseous abnormality.      CT-HEAD W/O   Final Result         1.  No acute intracranial abnormality.   2.  Stable partial opacification of left mastoid air cells, consider effusion or component of mastoiditis as clinically appropriate      DX-CHEST-PORTABLE (1 VIEW)   Final Result      No evidence of acute cardiopulmonary process.           Assessment/Plan  * Altered level of consciousness- (present on admission)  Assessment & Plan  Highly suspect this is secondary to polypharmacy, patient is on a lot of sedating medications  She does have a history of accidental overdose in the past but denies this  No seizure activity noted, no evidence of rhabdomyolysis  Currently mentation has resolved, is AAOx4 past 2 days.  He had a long discussion in terms of decreasing her sedating medications.  12/17. No changes in her pain regimen and sedatives  Follow up to her Pain Clinic.  12/18. Discussed with Dr. Sabillon  Not very clear diagnoses but managed for functional transverse myelitis,  possible optic neuritis, seen by Dr. Newton  Spine stimulator limits further evaluation for other causes of enceiphalopathy/weakness/falls such as MRIs.  Taper of gabapentin and transition to Trileptal  Modenalfil also started  Monitor her symptoms of weakness causing her to fall    Morbidly obese ([facility])- (present on admission)  Assessment & Plan  Body mass index is 46.26 kg/m².  Encourage healthy lifestyle and physical activity.    History of optic neuritis  Assessment & Plan  Per her report and she follows Dr. Wilkins.    ASTHMA  Assessment & Plan  Controlled on Symbicort, montelukast  Continue RT protocol, duo nebs, Pep therapy if warranted, and incentive spirometry.       Mastoiditis  Assessment & Plan  CT head shows Stable partial opacification of left mastoid air cells, consider effusion or component of mastoiditis as clinically appropriate  Resume augmentin.    Immunocompromised ([facility])- (present on admission)  Assessment & Plan  On steroids and immunosuppressants for optic neuritis followed byu Dr. Newton, NeuroOphthalmology    Acquired hypothyroidism- (present on admission)  Assessment & Plan  Resume home dose of Synthroid  Last month her TSH was within normal limits    Type 2 diabetes mellitus with hyperglycemia, without long-term current use of insulin ([facility])- (present on admission)  Assessment & Plan  Continue Insulin-sliding scale, accu-checks and hypoglycemia protocol.  Continue with Consistent Carbohydrate diet   Hold home dose of trulicity     Depression- (present on admission)  Assessment & Plan  Resume home dose of Prozac    GERD (gastroesophageal reflux disease)- (present on admission)  Assessment & Plan  Continue with Pepcid    Borderline personality disorder in adult ([facility])- (present on admission)  Assessment & Plan  Continue with home dose of Prozac, Geodon and trazodone      Patient plan of care discussed at multidisplinary team rounds and with patient and R.N at beside.      VTE prophylaxis:  Lovenox     Reviewed vitals, labs, imaging, staff notes.  Discussed assessment and plan with Kristin Balderrama   Discussed with RN and social work  Discussed with Psychiatry

## 2019-12-19 NOTE — CARE PLAN
Problem: Safety  Goal: Will remain free from falls  Outcome: PROGRESSING AS EXPECTED  Note:   Pt. is unsteady on her feet and requires the use of a front wheel walker and two person assist. Pt. Reported dizziness upon sitting up from a supine position. Pt. Was instructed to dangle feet prior to standing. Room has adequate lighting, is free of clutter, call light is within reach, and bed is locked and in the lowest position. Fall precautions reinforced. Will continue to hourly round.         Problem: Communication  Goal: The ability to communicate needs accurately and effectively will improve  Outcome: PROGRESSING AS EXPECTED  Note:   Pt. Is A&O x 4. Follows commands and responds appropriately. Pt. Has reported pain, which we are currently treating with medication and heat packs. Will continue to round hourly and encourage the Pt. To ask questions and voice feelings.

## 2019-12-19 NOTE — CARE PLAN
Problem: Knowledge Deficit  Goal: Knowledge of disease process/condition, treatment plan, diagnostic tests, and medications will improve  Outcome: PROGRESSING AS EXPECTED     Problem: Communication  Goal: The ability to communicate needs accurately and effectively will improve  Outcome: PROGRESSING AS EXPECTED

## 2019-12-20 ENCOUNTER — APPOINTMENT (OUTPATIENT)
Dept: RADIOLOGY | Facility: MEDICAL CENTER | Age: 29
DRG: 074 | End: 2019-12-20
Attending: INTERNAL MEDICINE
Payer: MEDICARE

## 2019-12-20 PROBLEM — Z71.89 ADVANCED CARE PLANNING/COUNSELING DISCUSSION: Status: ACTIVE | Noted: 2019-12-20

## 2019-12-20 LAB
GLUCOSE BLD-MCNC: 103 MG/DL (ref 65–99)
GLUCOSE BLD-MCNC: 146 MG/DL (ref 65–99)
GLUCOSE BLD-MCNC: 154 MG/DL (ref 65–99)
GLUCOSE BLD-MCNC: 155 MG/DL (ref 65–99)
GLUCOSE BLD-MCNC: 158 MG/DL (ref 65–99)

## 2019-12-20 PROCEDURE — A9270 NON-COVERED ITEM OR SERVICE: HCPCS | Performed by: INTERNAL MEDICINE

## 2019-12-20 PROCEDURE — 700102 HCHG RX REV CODE 250 W/ 637 OVERRIDE(OP): Performed by: INTERNAL MEDICINE

## 2019-12-20 PROCEDURE — A9270 NON-COVERED ITEM OR SERVICE: HCPCS | Performed by: PSYCHIATRY & NEUROLOGY

## 2019-12-20 PROCEDURE — A9270 NON-COVERED ITEM OR SERVICE: HCPCS | Performed by: HOSPITALIST

## 2019-12-20 PROCEDURE — 700102 HCHG RX REV CODE 250 W/ 637 OVERRIDE(OP): Performed by: HOSPITALIST

## 2019-12-20 PROCEDURE — 700111 HCHG RX REV CODE 636 W/ 250 OVERRIDE (IP): Performed by: NURSE PRACTITIONER

## 2019-12-20 PROCEDURE — 700105 HCHG RX REV CODE 258: Performed by: INTERNAL MEDICINE

## 2019-12-20 PROCEDURE — 82962 GLUCOSE BLOOD TEST: CPT | Mod: 91

## 2019-12-20 PROCEDURE — 770006 HCHG ROOM/CARE - MED/SURG/GYN SEMI*

## 2019-12-20 PROCEDURE — 700111 HCHG RX REV CODE 636 W/ 250 OVERRIDE (IP): Performed by: INTERNAL MEDICINE

## 2019-12-20 PROCEDURE — 700102 HCHG RX REV CODE 250 W/ 637 OVERRIDE(OP): Performed by: PSYCHIATRY & NEUROLOGY

## 2019-12-20 PROCEDURE — 99232 SBSQ HOSP IP/OBS MODERATE 35: CPT | Performed by: INTERNAL MEDICINE

## 2019-12-20 RX ORDER — PREDNISONE 10 MG/1
10 TABLET ORAL DAILY
Status: DISCONTINUED | OUTPATIENT
Start: 2019-12-21 | End: 2019-12-31 | Stop reason: HOSPADM

## 2019-12-20 RX ADMIN — BUSPIRONE HYDROCHLORIDE 10 MG: 10 TABLET ORAL at 17:44

## 2019-12-20 RX ADMIN — IBUPROFEN 800 MG: 800 TABLET, FILM COATED ORAL at 20:29

## 2019-12-20 RX ADMIN — PREGABALIN 300 MG: 100 CAPSULE ORAL at 17:44

## 2019-12-20 RX ADMIN — AMOXICILLIN AND CLAVULANATE POTASSIUM 1 TABLET: 875; 125 TABLET, FILM COATED ORAL at 06:00

## 2019-12-20 RX ADMIN — AMOXICILLIN AND CLAVULANATE POTASSIUM 1 TABLET: 875; 125 TABLET, FILM COATED ORAL at 17:43

## 2019-12-20 RX ADMIN — GABAPENTIN 200 MG: 100 CAPSULE ORAL at 08:34

## 2019-12-20 RX ADMIN — OXCARBAZEPINE 150 MG: 150 TABLET, FILM COATED ORAL at 17:44

## 2019-12-20 RX ADMIN — BUTALBITAL, ACETAMINOPHEN, AND CAFFEINE 1 TABLET: 50; 325; 40 TABLET ORAL at 08:35

## 2019-12-20 RX ADMIN — BUSPIRONE HYDROCHLORIDE 10 MG: 10 TABLET ORAL at 06:04

## 2019-12-20 RX ADMIN — POTASSIUM CHLORIDE 20 MEQ: 20 TABLET, EXTENDED RELEASE ORAL at 06:10

## 2019-12-20 RX ADMIN — ZIPRASIDONE HYDROCHLORIDE 80 MG: 80 CAPSULE ORAL at 06:03

## 2019-12-20 RX ADMIN — PREDNISONE 60 MG: 50 TABLET ORAL at 15:17

## 2019-12-20 RX ADMIN — LEVOTHYROXINE SODIUM 75 MCG: 75 TABLET ORAL at 06:07

## 2019-12-20 RX ADMIN — BUTALBITAL, ACETAMINOPHEN, AND CAFFEINE 1 TABLET: 50; 325; 40 TABLET ORAL at 23:19

## 2019-12-20 RX ADMIN — FUROSEMIDE 80 MG: 40 TABLET ORAL at 06:06

## 2019-12-20 RX ADMIN — DIPHENHYDRAMINE HYDROCHLORIDE 25 MG: 25 TABLET ORAL at 20:29

## 2019-12-20 RX ADMIN — TIOTROPIUM BROMIDE 1 CAPSULE: 18 CAPSULE ORAL; RESPIRATORY (INHALATION) at 06:05

## 2019-12-20 RX ADMIN — SODIUM BICARBONATE 650 MG: 650 TABLET ORAL at 17:44

## 2019-12-20 RX ADMIN — MODAFINIL 100 MG: 100 TABLET ORAL at 06:11

## 2019-12-20 RX ADMIN — ACETAMINOPHEN 650 MG: 325 TABLET, FILM COATED ORAL at 17:42

## 2019-12-20 RX ADMIN — ACETAMINOPHEN 650 MG: 325 TABLET, FILM COATED ORAL at 10:47

## 2019-12-20 RX ADMIN — DIPHENHYDRAMINE HYDROCHLORIDE 25 MG: 25 TABLET ORAL at 00:12

## 2019-12-20 RX ADMIN — ACETAMINOPHEN 650 MG: 325 TABLET, FILM COATED ORAL at 00:12

## 2019-12-20 RX ADMIN — MECLIZINE HYDROCHLORIDE 12.5 MG: 25 TABLET ORAL at 08:35

## 2019-12-20 RX ADMIN — PREGABALIN 300 MG: 100 CAPSULE ORAL at 06:08

## 2019-12-20 RX ADMIN — ZIPRASIDONE HYDROCHLORIDE 80 MG: 80 CAPSULE ORAL at 17:44

## 2019-12-20 RX ADMIN — ENOXAPARIN SODIUM 40 MG: 100 INJECTION SUBCUTANEOUS at 06:16

## 2019-12-20 RX ADMIN — GABAPENTIN 200 MG: 100 CAPSULE ORAL at 15:17

## 2019-12-20 RX ADMIN — ONDANSETRON 4 MG: 4 TABLET, ORALLY DISINTEGRATING ORAL at 16:39

## 2019-12-20 RX ADMIN — TRAZODONE HYDROCHLORIDE 50 MG: 100 TABLET ORAL at 17:46

## 2019-12-20 RX ADMIN — BUDESONIDE AND FORMOTEROL FUMARATE DIHYDRATE 2 PUFF: 160; 4.5 AEROSOL RESPIRATORY (INHALATION) at 06:14

## 2019-12-20 RX ADMIN — IBUPROFEN 800 MG: 800 TABLET, FILM COATED ORAL at 07:39

## 2019-12-20 RX ADMIN — INSULIN HUMAN 1 UNITS: 100 INJECTION, SOLUTION PARENTERAL at 20:36

## 2019-12-20 RX ADMIN — MYCOPHENOLATE MOFETIL 1000 MG: 250 CAPSULE ORAL at 17:45

## 2019-12-20 RX ADMIN — GABAPENTIN 200 MG: 100 CAPSULE ORAL at 17:44

## 2019-12-20 RX ADMIN — SENNOSIDES AND DOCUSATE SODIUM 2 TABLET: 8.6; 5 TABLET ORAL at 17:44

## 2019-12-20 RX ADMIN — FLUOXETINE HYDROCHLORIDE 40 MG: 20 CAPSULE ORAL at 06:04

## 2019-12-20 RX ADMIN — POTASSIUM CHLORIDE 20 MEQ: 20 TABLET, EXTENDED RELEASE ORAL at 17:45

## 2019-12-20 RX ADMIN — INSULIN HUMAN 1 UNITS: 100 INJECTION, SOLUTION PARENTERAL at 17:36

## 2019-12-20 RX ADMIN — GABAPENTIN 200 MG: 100 CAPSULE ORAL at 20:29

## 2019-12-20 RX ADMIN — SODIUM BICARBONATE 650 MG: 650 TABLET ORAL at 06:07

## 2019-12-20 RX ADMIN — SODIUM CHLORIDE 1000 MG: 9 INJECTION, SOLUTION INTRAVENOUS at 06:24

## 2019-12-20 RX ADMIN — OXCARBAZEPINE 150 MG: 150 TABLET, FILM COATED ORAL at 06:04

## 2019-12-20 RX ADMIN — BUDESONIDE AND FORMOTEROL FUMARATE DIHYDRATE 2 PUFF: 160; 4.5 AEROSOL RESPIRATORY (INHALATION) at 17:45

## 2019-12-20 RX ADMIN — MYCOPHENOLATE MOFETIL 1000 MG: 250 CAPSULE ORAL at 07:38

## 2019-12-20 RX ADMIN — MECLIZINE HYDROCHLORIDE 12.5 MG: 25 TABLET ORAL at 23:19

## 2019-12-20 RX ADMIN — ASPIRIN 81 MG: 81 TABLET, COATED ORAL at 06:15

## 2019-12-20 RX ADMIN — FAMOTIDINE 20 MG: 20 TABLET ORAL at 06:08

## 2019-12-20 ASSESSMENT — ENCOUNTER SYMPTOMS
HEADACHES: 0
TREMORS: 0
DOUBLE VISION: 0
TINGLING: 0
VOMITING: 0
MYALGIAS: 1
DEPRESSION: 0
CHILLS: 0
EYE PAIN: 0
HEMOPTYSIS: 0
WEAKNESS: 0
BLURRED VISION: 0
DIZZINESS: 0
PND: 0
NAUSEA: 1
SENSORY CHANGE: 0
BACK PAIN: 1
SORE THROAT: 0
PHOTOPHOBIA: 0
ABDOMINAL PAIN: 0
NECK PAIN: 0
SHORTNESS OF BREATH: 0
HEARTBURN: 0
PALPITATIONS: 0
CLAUDICATION: 0
MEMORY LOSS: 0
CONSTIPATION: 0
FEVER: 0
SPUTUM PRODUCTION: 0
COUGH: 0
NERVOUS/ANXIOUS: 0
SPEECH CHANGE: 0
ORTHOPNEA: 0
DIARRHEA: 0
BLOOD IN STOOL: 0
STRIDOR: 0

## 2019-12-20 NOTE — DISCHARGE PLANNING
Anticipated Discharge Disposition:   SNF vs Rehab    Action:   RN CM contacted palliative care for consult order for advance directive assistance as well as DNR options.    Barriers to Discharge:   Medical Clearance   Discharge disposition/placement    Plan:   RN CM contacted [facility] (x [phone]) for consult for advance directive assistance and DNR options.

## 2019-12-20 NOTE — THERAPY
"Physical Therapy Treatment completed.   Bed Mobility:  Supine to Sit: Supervised  Transfers: Sit to Stand: Minimal Assist  Gait: Level Of Assist: Moderate Assist with Front-Wheel Walker second person for safety        Plan of Care: Will benefit from Physical Therapy 3 times per week  Discharge Recommendations: Equipment: Will Continue to Assess for Equipment Needs. Post-acute therapy Recommend post-acute placement for continued physical therapy services prior to discharge home. Patient can tolerate post-acute therapies at a 5x/week frequency.       See \"Rehab Therapy-Acute\" Patient Summary Report for complete documentation.     Pt was pleasant and motivated to participate in therapy session. Pt very motivated that she is \"going to do her best\". She completed bed mobility with SPV. Performed seated exercises for stengthening, practiced multiple sit to stands for strenghtening and side stepping for pre gait. Utilized fww and Gloria. She was able to progress to 15ft of gait with modA and second person for safety. She prefers therapist provide facilitation at her pelvis as this helps her pain. Per RN and chart review pt has had multiple falls recently with nursing. After about 5ft of gait she became tremulous but no overt LOB noted. And with vc to use BUE on fww and take deep breaths pt was less shaky during rest of gait. Continue to recommend post acute placement at [facility] as she is a high fall risk.   "

## 2019-12-20 NOTE — CONSULTS
*Palliative Care requested for Advanced Directive and POLST form completion only. This consult did not include a full discussion on goals of care. If the team is desiring a full consult, please call [phone] and alert the Palliative Care team. Please do not place another order.*      Advanced Directive and POSLT form reviewed with pt and Grandmother Vivienne at bedside. Pt choose Vivienne hoff DPOA, Emmy Hudson as 1st Alt and Valeria Kebede as 2nd Alt. Statements of desires #2, 3, & 5. Awaiting Notary signature.     POLST completed for DNR, Selective Treatment and trial period of 1 month for Tube Feedings. POLST awaiting MD signature.     Per Dr. Harper, pt is not able to make medical decisions at this time. Will consult Psychiatry to see if pt has capacity. Documents in PC office awaiting updates form Psychiatry.

## 2019-12-20 NOTE — CARE PLAN
Problem: Safety  Goal: Will remain free from injury  Outcome: PROGRESSING AS EXPECTED  Note:   Pt is unsteady on her feet, requires front wheel walker and one person assist. Room has adequate lighting, is free of clutter, call light is within reach, and bed is locked and in the lowest position. Will continue to hourly round.         Problem: Communication  Goal: The ability to communicate needs accurately and effectively will improve  Outcome: PROGRESSING AS EXPECTED  Note:   Pt. Is A&O x 4. Follows commands and responds appropriately. Will continue to round hourly and encourage the Pt. To ask questions and voice feelings.

## 2019-12-20 NOTE — DISCHARGE PLANNING
Agency/Facility Name: [facility]  Outcome: Patient declined- psych needs.    Agency/Facility Name: [facility]  Outcome: Patient declined- care exceeds capacity.    Agency/Facility Name: [facility]  Spoke To: Ness  Outcome: Referral pending review. Ness will call back with update on status of referral.    Agency/Facility Name: [facility]  Spoke To: Bhavana  Outcome: Patient declined due to non-contracted insurance provider.

## 2019-12-20 NOTE — PROGRESS NOTES
Pt A&Ox4, c/o 8/10 hip/back/knee pain, medicating per MAR, encouraging heat packs and repositioning. No vision issues, pt able to read whiteboard clearly, intermittent numbness and tingling to LLE. Pt tolerating diet, no need for zofran this evening. Pt reports having leg spasms, jerky movements while ambulating up to bathroom, high fall risk, pt needing much encouragement to focus with ambulating. Pt had full bed bath and linen change, had incontinent BM episode in bed.     USGIV placed to LFA for solumedrol.

## 2019-12-20 NOTE — CARE PLAN
Problem: Safety  Goal: Will remain free from injury  Outcome: PROGRESSING AS EXPECTED  Goal: Will remain free from falls  Outcome: PROGRESSING AS EXPECTED     Problem: Infection  Goal: Will remain free from infection  Outcome: PROGRESSING AS EXPECTED     Problem: Respiratory:  Goal: Respiratory status will improve  Outcome: PROGRESSING AS EXPECTED     Problem: Discharge Barriers/Planning  Goal: Patient's continuum of care needs will be met  Outcome: PROGRESSING AS EXPECTED     Problem: Communication  Goal: The ability to communicate needs accurately and effectively will improve  Outcome: PROGRESSING AS EXPECTED     Problem: Venous Thromboembolism (VTW)/Deep Vein Thrombosis (DVT) Prevention:  Goal: Patient will participate in Venous Thrombosis (VTE)/Deep Vein Thrombosis (DVT)Prevention Measures  Outcome: PROGRESSING AS EXPECTED     Problem: Knowledge Deficit  Goal: Knowledge of the prescribed therapeutic regimen will improve  Note:   Difficult to assess progress made, responses are inconsistent

## 2019-12-21 LAB
GLUCOSE BLD-MCNC: 108 MG/DL (ref 65–99)
GLUCOSE BLD-MCNC: 120 MG/DL (ref 65–99)
GLUCOSE BLD-MCNC: 127 MG/DL (ref 65–99)
GLUCOSE BLD-MCNC: 84 MG/DL (ref 65–99)

## 2019-12-21 PROCEDURE — 700102 HCHG RX REV CODE 250 W/ 637 OVERRIDE(OP): Performed by: INTERNAL MEDICINE

## 2019-12-21 PROCEDURE — A9270 NON-COVERED ITEM OR SERVICE: HCPCS | Performed by: HOSPITALIST

## 2019-12-21 PROCEDURE — 700111 HCHG RX REV CODE 636 W/ 250 OVERRIDE (IP): Performed by: INTERNAL MEDICINE

## 2019-12-21 PROCEDURE — 700102 HCHG RX REV CODE 250 W/ 637 OVERRIDE(OP): Performed by: HOSPITALIST

## 2019-12-21 PROCEDURE — A9270 NON-COVERED ITEM OR SERVICE: HCPCS | Performed by: INTERNAL MEDICINE

## 2019-12-21 PROCEDURE — 700102 HCHG RX REV CODE 250 W/ 637 OVERRIDE(OP): Performed by: PSYCHIATRY & NEUROLOGY

## 2019-12-21 PROCEDURE — 700111 HCHG RX REV CODE 636 W/ 250 OVERRIDE (IP): Performed by: NURSE PRACTITIONER

## 2019-12-21 PROCEDURE — 99232 SBSQ HOSP IP/OBS MODERATE 35: CPT | Performed by: INTERNAL MEDICINE

## 2019-12-21 PROCEDURE — A9270 NON-COVERED ITEM OR SERVICE: HCPCS | Performed by: PSYCHIATRY & NEUROLOGY

## 2019-12-21 PROCEDURE — 82962 GLUCOSE BLOOD TEST: CPT | Mod: 91

## 2019-12-21 PROCEDURE — 770006 HCHG ROOM/CARE - MED/SURG/GYN SEMI*

## 2019-12-21 RX ADMIN — OXCARBAZEPINE 150 MG: 150 TABLET, FILM COATED ORAL at 05:10

## 2019-12-21 RX ADMIN — ENOXAPARIN SODIUM 40 MG: 100 INJECTION SUBCUTANEOUS at 05:12

## 2019-12-21 RX ADMIN — POTASSIUM CHLORIDE 20 MEQ: 20 TABLET, EXTENDED RELEASE ORAL at 05:10

## 2019-12-21 RX ADMIN — ZIPRASIDONE HYDROCHLORIDE 80 MG: 80 CAPSULE ORAL at 05:11

## 2019-12-21 RX ADMIN — GABAPENTIN 200 MG: 100 CAPSULE ORAL at 13:57

## 2019-12-21 RX ADMIN — BUSPIRONE HYDROCHLORIDE 10 MG: 10 TABLET ORAL at 05:11

## 2019-12-21 RX ADMIN — ACETAMINOPHEN 650 MG: 325 TABLET, FILM COATED ORAL at 05:12

## 2019-12-21 RX ADMIN — POTASSIUM CHLORIDE 20 MEQ: 20 TABLET, EXTENDED RELEASE ORAL at 17:00

## 2019-12-21 RX ADMIN — BUTALBITAL, ACETAMINOPHEN, AND CAFFEINE 1 TABLET: 50; 325; 40 TABLET ORAL at 21:21

## 2019-12-21 RX ADMIN — ASPIRIN 81 MG: 81 TABLET, COATED ORAL at 05:31

## 2019-12-21 RX ADMIN — ACETAMINOPHEN 650 MG: 325 TABLET, FILM COATED ORAL at 13:57

## 2019-12-21 RX ADMIN — GABAPENTIN 200 MG: 100 CAPSULE ORAL at 20:35

## 2019-12-21 RX ADMIN — BUDESONIDE AND FORMOTEROL FUMARATE DIHYDRATE 2 PUFF: 160; 4.5 AEROSOL RESPIRATORY (INHALATION) at 05:13

## 2019-12-21 RX ADMIN — TRAZODONE HYDROCHLORIDE 50 MG: 100 TABLET ORAL at 17:00

## 2019-12-21 RX ADMIN — MODAFINIL 100 MG: 100 TABLET ORAL at 05:11

## 2019-12-21 RX ADMIN — PREDNISONE 10 MG: 10 TABLET ORAL at 05:10

## 2019-12-21 RX ADMIN — SODIUM BICARBONATE 650 MG: 650 TABLET ORAL at 05:10

## 2019-12-21 RX ADMIN — OXCARBAZEPINE 150 MG: 150 TABLET, FILM COATED ORAL at 17:00

## 2019-12-21 RX ADMIN — FLUOXETINE HYDROCHLORIDE 40 MG: 20 CAPSULE ORAL at 05:10

## 2019-12-21 RX ADMIN — FAMOTIDINE 20 MG: 20 TABLET ORAL at 05:11

## 2019-12-21 RX ADMIN — SODIUM BICARBONATE 650 MG: 650 TABLET ORAL at 17:00

## 2019-12-21 RX ADMIN — MECLIZINE HYDROCHLORIDE 12.5 MG: 25 TABLET ORAL at 10:48

## 2019-12-21 RX ADMIN — GABAPENTIN 200 MG: 100 CAPSULE ORAL at 08:19

## 2019-12-21 RX ADMIN — BUDESONIDE AND FORMOTEROL FUMARATE DIHYDRATE 2 PUFF: 160; 4.5 AEROSOL RESPIRATORY (INHALATION) at 17:08

## 2019-12-21 RX ADMIN — AMOXICILLIN AND CLAVULANATE POTASSIUM 1 TABLET: 875; 125 TABLET, FILM COATED ORAL at 05:10

## 2019-12-21 RX ADMIN — MYCOPHENOLATE MOFETIL 1000 MG: 250 CAPSULE ORAL at 17:00

## 2019-12-21 RX ADMIN — IBUPROFEN 800 MG: 800 TABLET, FILM COATED ORAL at 08:20

## 2019-12-21 RX ADMIN — PREGABALIN 300 MG: 100 CAPSULE ORAL at 05:11

## 2019-12-21 RX ADMIN — LEVOTHYROXINE SODIUM 75 MCG: 75 TABLET ORAL at 05:12

## 2019-12-21 RX ADMIN — AMOXICILLIN AND CLAVULANATE POTASSIUM 1 TABLET: 875; 125 TABLET, FILM COATED ORAL at 17:00

## 2019-12-21 RX ADMIN — ZIPRASIDONE HYDROCHLORIDE 80 MG: 80 CAPSULE ORAL at 17:00

## 2019-12-21 RX ADMIN — ALBUTEROL SULFATE 2 PUFF: 90 AEROSOL, METERED RESPIRATORY (INHALATION) at 05:12

## 2019-12-21 RX ADMIN — FUROSEMIDE 80 MG: 40 TABLET ORAL at 05:11

## 2019-12-21 RX ADMIN — BUSPIRONE HYDROCHLORIDE 10 MG: 10 TABLET ORAL at 17:00

## 2019-12-21 RX ADMIN — IBUPROFEN 800 MG: 800 TABLET, FILM COATED ORAL at 23:06

## 2019-12-21 RX ADMIN — MYCOPHENOLATE MOFETIL 1000 MG: 250 CAPSULE ORAL at 05:12

## 2019-12-21 RX ADMIN — TIOTROPIUM BROMIDE 1 CAPSULE: 18 CAPSULE ORAL; RESPIRATORY (INHALATION) at 05:10

## 2019-12-21 RX ADMIN — PREGABALIN 300 MG: 100 CAPSULE ORAL at 16:59

## 2019-12-21 RX ADMIN — GABAPENTIN 200 MG: 100 CAPSULE ORAL at 17:00

## 2019-12-21 ASSESSMENT — ENCOUNTER SYMPTOMS
DIARRHEA: 0
BLOOD IN STOOL: 0
BLURRED VISION: 0
EYE PAIN: 0
SORE THROAT: 0
ORTHOPNEA: 0
TREMORS: 0
BACK PAIN: 1
PHOTOPHOBIA: 0
HEARTBURN: 0
NAUSEA: 1
SPEECH CHANGE: 0
DOUBLE VISION: 0
DEPRESSION: 0
PALPITATIONS: 0
STRIDOR: 0
MEMORY LOSS: 0
NERVOUS/ANXIOUS: 0
FEVER: 0
HEMOPTYSIS: 0
CONSTIPATION: 0
PND: 0
SENSORY CHANGE: 0
CLAUDICATION: 0
HEADACHES: 0
WEAKNESS: 0
SHORTNESS OF BREATH: 0
TINGLING: 0
SPUTUM PRODUCTION: 0
DIZZINESS: 0
MYALGIAS: 1
VOMITING: 0
CHILLS: 0
ABDOMINAL PAIN: 0
COUGH: 0
NECK PAIN: 0

## 2019-12-21 NOTE — CARE PLAN
Problem: Safety  Goal: Will remain free from falls  Outcome: PROGRESSING AS EXPECTED     Problem: Bowel/Gastric:  Goal: Normal bowel function is maintained or improved  Outcome: PROGRESSING AS EXPECTED     Problem: Mobility  Goal: Risk for activity intolerance will decrease  Outcome: PROGRESSING AS EXPECTED     Problem: Respiratory:  Goal: Respiratory status will improve  Outcome: PROGRESSING SLOWER THAN EXPECTED     Problem: Pain Management  Goal: Pain level will decrease to patient's comfort goal  Outcome: PROGRESSING SLOWER THAN EXPECTED

## 2019-12-21 NOTE — PROGRESS NOTES
"Hospital Medicine Daily Progress Note    Date of Service  12/20/2019    Chief Complaint  29 y.o. female admitted 12/13/2019 with AMS    Hospital Course    per HPI  29 y.o. female who presented 12/13/2019 with altered level consciousness.  At this point in time, patient is mostly nonverbal, information obtained from her grandmother who was the one who found her.  She states whenever she left she was sleeping but just prior to this had been normal.  She states this morning she had some shortness of breath, she does have a history of asthma.  She did use breathing treatments and a home nurse helped with her breathing and it did seem to normalize.  Her grandmother left, was gone around 90 minutes, upon returning home she found her down, unconscious.  She did call 911.  She did attempt to wake up her granddaughter for quite some time and eventually she did wake up but seemed very groggy and confused.  She states she did just finish her antibiotics for her UTI/bronchitis, otherwise has been taking her usual home medications.  She has not been missing any medications nor taking extra of her medications.  I did discuss the case including labs and imaging with the ER physician.             Interval Problem Update  Patient is still complaining of mild nausea, having back spasms which is chronic, 7/10 in severity at times. Feels like she needs more time to feel better. Denies fevers/chills, denies dysuria or diarrhea.  12/17.   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 Newton, Neuro-Ophthalmology.  History of atrial fibrillation and cardiomyopathy? On Lasix, aspirin and ivadrabine. July 2019 echo: Normal left ventricular systolic function.  Left ventricular ejection fraction is visually estimated to be 65%.  Normal left ventricular wall " "thickness.  Normal left atrial size.  Trace mitral regurgitation.  Structurally normal aortic valve without significant stenosis or   regurgitation.  Estimated right ventricular systolic pressure  is 30 mmHg.  Trace tricuspid regurgitation.  Normal right ventricular size.  Also chronic pain and neuropathy  Patient fell per nursing and now mentions she has intractable R hip pain and can't walk. Ordered Hip XR and that turned out to be normal, no arthritis.  Later she mentioned she choked on a piece of meat and is short of breath, She however speaks full sentences, not hypoxic, not wheezing.   She is on several pain medications and anti-inflammatories; sees Pain Clinic and Dr. Cabral, Neuroophthalmology. She mentions she is on low dose steroids prescribed to her by Dr. Wilkins.  12/18. Patient \"fell to knees\" again.  Discussed with Dr. Sabillon in depth.  12/19. Unchanged nonspecific complaints of pain and weakness.  I called dr. Chowdary, who is following her for optic neuritis.  I then called Dr. Rincon, and Cande Jennings, Neurology  12/20. No issues or complaints currently. Grandmother at bedside with questions.  SW/Palliative asking about competency eval as patient desires to be DNR.  For now I toyn josemanuel Speech for cognitive eval. Psych has been involved so may need formal neurocognitive testing for competency      Consultants/Specialty  Psychiatry    Code Status  Full code    Disposition  PT/OT reevaluated.  Because of fall risk they recommend rehab or SNF.  Grandmother wanted her home however she is 80 years old and she did not argue when patient pointed that out.  12/18. Plan for rehab or SNF  12/20. Will need to finish high dose steroid trial and have formal neurocognitive eval for competency.    Review of Systems  Review of Systems   Constitutional: Positive for malaise/fatigue. Negative for chills and fever.   HENT: Negative for congestion, hearing loss, sore throat and tinnitus.    Eyes: Negative for " blurred vision, double vision, photophobia and pain.   Respiratory: Negative for cough, hemoptysis, sputum production, shortness of breath and stridor.    Cardiovascular: Negative for chest pain, palpitations, orthopnea, claudication and PND.   Gastrointestinal: Positive for nausea. Negative for abdominal pain, blood in stool, constipation, diarrhea, heartburn, melena and vomiting.   Genitourinary: Negative for dysuria, frequency and urgency.   Musculoskeletal: Positive for back pain (spasms) and myalgias. Negative for neck pain.   Neurological: Negative for dizziness, tingling, tremors, sensory change, speech change, weakness and headaches.   Psychiatric/Behavioral: Negative for depression, memory loss and suicidal ideas. The patient is not nervous/anxious.    All other systems reviewed and are negative.       Physical Exam  Temp:  [36.3 °C (97.4 °F)-36.8 °C (98.3 °F)] 36.8 °C (98.3 °F)  Pulse:  [89-94] 94  Resp:  [16-18] 16  BP: (102-118)/(49-67) 102/49  SpO2:  [92 %-98 %] 92 %    Physical Exam  Vitals signs and nursing note reviewed.   Constitutional:       General: She is not in acute distress.     Appearance: Normal appearance. She is obese. She is not ill-appearing.   HENT:      Head: Normocephalic and atraumatic.      Right Ear: Tympanic membrane normal.      Left Ear: Tympanic membrane normal.      Nose: No congestion.      Mouth/Throat:      Mouth: Mucous membranes are dry.      Pharynx: Oropharynx is clear. No oropharyngeal exudate or posterior oropharyngeal erythema.   Eyes:      Extraocular Movements: Extraocular movements intact.      Conjunctiva/sclera: Conjunctivae normal.      Pupils: Pupils are equal, round, and reactive to light.   Neck:      Musculoskeletal: Normal range of motion and neck supple. No neck rigidity.   Cardiovascular:      Rate and Rhythm: Normal rate.      Pulses: Normal pulses.      Heart sounds: No murmur.   Pulmonary:      Effort: Pulmonary effort is normal. No respiratory  distress.      Breath sounds: Normal breath sounds. No wheezing or rales.   Abdominal:      General: Abdomen is flat. Bowel sounds are normal. There is no distension.      Palpations: Abdomen is soft.      Tenderness: There is no tenderness. There is no guarding.   Musculoskeletal: Normal range of motion.         General: Tenderness (nonspecific myalgias) present. No swelling.      Comments: Nonspecific myalgias, arthralgias out of proportion to exam   Skin:     General: Skin is warm and dry.      Capillary Refill: Capillary refill takes less than 2 seconds.      Coloration: Skin is not pale.   Neurological:      General: No focal deficit present.      Mental Status: She is alert and oriented to person, place, and time. Mental status is at baseline.      Cranial Nerves: No cranial nerve deficit.      Comments: Odd insight at times   Psychiatric:      Comments: Anxious and needy behavior  Mentioned she has auditory hallucinations  12/20. Currently more stable moods.         Fluids    Intake/Output Summary (Last 24 hours) at 12/20/2019 1728  Last data filed at 12/20/2019 0418  Gross per 24 hour   Intake 800 ml   Output --   Net 800 ml       Laboratory                        Imaging  DX-HIP-UNILATERAL-WITH PELVIS-1 VIEW RIGHT   Final Result      No acute osseous abnormality.      CT-HEAD W/O   Final Result         1.  No acute intracranial abnormality.   2.  Stable partial opacification of left mastoid air cells, consider effusion or component of mastoiditis as clinically appropriate      DX-CHEST-PORTABLE (1 VIEW)   Final Result      No evidence of acute cardiopulmonary process.           Assessment/Plan  * Altered level of consciousness- (present on admission)  Assessment & Plan  Highly suspect this is secondary to polypharmacy, patient is on a lot of sedating medications  She does have a history of accidental overdose in the past but denies this  No seizure activity noted, no evidence of rhabdomyolysis  Currently  "mentation has resolved, is AAOx4 past 2 days.  He had a long discussion in terms of decreasing her sedating medications.  12/17. No changes in her pain regimen and sedatives  Follow up to her Pain Clinic.  12/18. Discussed with Dr. Sabillon  Not very clear diagnoses but managed for functional transverse myelitis, possible optic neuritis, seen by Dr. Newton  Spine stimulator limits further evaluation for other causes of enceiphalopathy/weakness/falls such as MRIs.  Taper of gabapentin and transition to Trileptal  Modenalfil also started  Monitor her symptoms of weakness causing her to fall  12/19. May have a functional component but Dr. Chowdary feels there could be neurologic disease  He recommended formal neurology consult to r/o transverse myelitis or CNS lesions and see if it is acceptable to get a CT myelogram since MRI is contraindicated due to her gastric pacer/stimulator  Discussed with Neurology.   Has had CT myelogram in the past per their records and seen Dr. Fox. Has been nondiagnostic.  Currently no other possible myelopathy diagnosis.  Symptomatology also inconsistent as she has reflexes  She has been worked up for myopathies; on Dr. Fox's notes in the past, muscle biopsy was nondiagnostic.  In the past IV Solumedrol was given and per Dr. Fox, shpowed some benefit for a diagnosis of \"paralysis\" and \"lower motor neuron etiology\". According to patient she has been also [facility] and she may possibly have a mitochondrial type mypopathy or neuromyopathy. This was 2 years ago however and recently she has been experiencing weakness and falls again.  Currently agree that no need for CT myelogram right now, will evaluate if high dose steroids will be of any benefit otherwise she will have to follow-up with Dr. Fox.  Ordered 3d course of Solu-medrol to see if there is any benefit, as Neurology recommended no harm to try what worked in the past.  12/20.  Discussed plan with " grandmother and patient  Will do the steroid trial. Poor IV access so switch to prednisone 60x 1 followe dby pred 10 as per Dr. 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 Inpatient Neurology has no further comment and recommnd following Dr. Fox, neurology for further management  Meanwhile grandmother admits she cannot care for her 24/7 so she is awaiting SNF/rehab/ SW working on it.    Advanced care planning/counseling discussion  Assessment & Plan  12/20. Patient has indicated to Palliative that she wants to be DNR  Currently she does not have a diagnosis to say that she is terminal  She also has been followed by Psychiatry and has had altered mentation in the past  I explained to Palliative and SW that a formal neuro cognitive eval needs to be done for competency as I can only assess capacity  SLP to do cognitive eval and will let me know if formal cognitive eval needed, and if so I will consult Psychology.    Morbidly obese (HCC)- (present on admission)  Assessment & Plan  Body mass index is 46.26 kg/m².  Encourage healthy lifestyle and physical activity.    History of optic neuritis  Assessment & Plan  Per her report and she follows Dr. Wilkins.    ASTHMA  Assessment & Plan  Controlled on Symbicort, montelukast  Continue RT protocol, duo nebs, Pep therapy if warranted, and incentive spirometry.       Mastoiditis  Assessment & Plan  CT head shows Stable partial opacification of left mastoid air cells, consider effusion or component of mastoiditis as clinically appropriate  Resume augmentin.    Immunocompromised (HCC)- (present on admission)  Assessment & Plan  On steroids and immunosuppressants for optic neuritis followed byu Dr. Newton, NeuroOphthalmology    Acquired hypothyroidism- (present on admission)  Assessment & Plan  Resume home dose of Synthroid  Last month her TSH was within normal  limits    Type 2 diabetes mellitus with hyperglycemia, without long-term current use of insulin ([facility])- (present on admission)  Assessment & Plan  Continue Insulin-sliding scale, accu-checks and hypoglycemia protocol.  Continue with Consistent Carbohydrate diet   Hold home dose of trulicity     Depression- (present on admission)  Assessment & Plan  Resume home dose of Prozac    GERD (gastroesophageal reflux disease)- (present on admission)  Assessment & Plan  Continue with Pepcid    Borderline personality disorder in adult ([facility])- (present on admission)  Assessment & Plan  Continue with home dose of Prozac, Geodon and trazodone      Patient plan of care discussed at multidisplinary team rounds and with patient and R.N at [facility].      VTE prophylaxis: Lovenox     Reviewed vitals, labs, imaging, staff notes.  Discussed assessment and plan with Kristin Balderrama   Discussed with RN and social work  Discussed with Psychiatry  Discussed with Neurology

## 2019-12-21 NOTE — ASSESSMENT & PLAN NOTE
12/20. Patient has indicated to Palliative that she wants to be DNR. Currently she does not have a diagnosis to say that she is terminal. She also has been followed by Psychiatry and has had altered mentation in the past. I explained to Palliative and SW that a formal neuro cognitive eval needs to be done for competency as I can only assess capacity. SLP to do cognitive eval and will let me know if formal cognitive eval needed, and if so I will consult Psychology.  12/21. Speech for cognitive eval, and if they recommend formal testing for competency will have Psychology evaluate., By definition: Competency is a global assessment and legal determination made by a  in court. Capacity is a functional assessment and a clinical determination about a specific decision that can be made by any clinician familiar with a patient’s case. Hospitalists frequently encounter situations in which a patient’s capacity is called into question; in most cases, this is a determination a hospitalist can make independent of consultants.  12/23. Awaiting SLP for cognitive eval. Reconsult Psych to assess if there is any behavioral component to affect her competency. Psychology consulted; depends on SLP and Psychiatry. If cognitive eval normal and no behaviporal issue/depression or suicidality from the aove evaluating services then DNR/DNI POLST and documentation for competency can be signed.  12/24-12/29 - Stable for now.  Patient really struggling more with self-defeating and choices of continuing to be victim to her past hurts.  Palliative care states patient desires to be a DNR.

## 2019-12-21 NOTE — DOCUMENTATION QUERY
[facility]                                                                       Query Response Note      PATIENT:               HARLEY DE LA CRUZ  ACCT #:                  [id number]  MRN:                     [id number]  :                      1990  ADMIT DATE:       2019 5:58 PM  DISCH DATE:          RESPONDING  PROVIDER #:        [id number]           QUERY TEXT:    Altered mental status is documented in the Medical Record.  Can a more specific diagnosis be provided?    NOTE:  If the appropriate response is not listed below, please respond with a new note.    The patient's Clinical Indicators include:  Admitted  with AMS, mostly nonverbal.  Found down, did wake up but seemed very groggy/ confused  ALOC, highly suspect secondary to polypharmacy, pt is on a lot of sedating medications  Currently mentation has resolved, AAOX4 past 2 days  Risk Factors: polypharmacy, mastoiditis  Treatment: decreasing sedating medications, augmentin, neuro checks  Options provided:   -- Acute metabolic encephalopathy   -- Acute toxic encephalopathy   -- Acute alcoholic encephalopathy   -- Acute encephalopathy due to other medical condition, (please specify other medical condition)   -- Delirium due to known physiological condition, (please specify the known physiological condition)   -- Delirium of unknown etiology   -- Unable to determine      Query created by: Liza Echols on 2019 1:58 PM    RESPONSE TEXT:    Unable to determine          Electronically signed by:  ANDREAS HARRIS 2019 7:44 AM

## 2019-12-22 LAB
GLUCOSE BLD-MCNC: 103 MG/DL (ref 65–99)
GLUCOSE BLD-MCNC: 124 MG/DL (ref 65–99)
GLUCOSE BLD-MCNC: 83 MG/DL (ref 65–99)
GLUCOSE BLD-MCNC: 89 MG/DL (ref 65–99)

## 2019-12-22 PROCEDURE — A9270 NON-COVERED ITEM OR SERVICE: HCPCS | Performed by: INTERNAL MEDICINE

## 2019-12-22 PROCEDURE — 700102 HCHG RX REV CODE 250 W/ 637 OVERRIDE(OP): Performed by: HOSPITALIST

## 2019-12-22 PROCEDURE — 99232 SBSQ HOSP IP/OBS MODERATE 35: CPT | Performed by: INTERNAL MEDICINE

## 2019-12-22 PROCEDURE — 700102 HCHG RX REV CODE 250 W/ 637 OVERRIDE(OP): Performed by: INTERNAL MEDICINE

## 2019-12-22 PROCEDURE — 700111 HCHG RX REV CODE 636 W/ 250 OVERRIDE (IP): Performed by: INTERNAL MEDICINE

## 2019-12-22 PROCEDURE — 700102 HCHG RX REV CODE 250 W/ 637 OVERRIDE(OP): Performed by: PSYCHIATRY & NEUROLOGY

## 2019-12-22 PROCEDURE — A9270 NON-COVERED ITEM OR SERVICE: HCPCS | Performed by: PSYCHIATRY & NEUROLOGY

## 2019-12-22 PROCEDURE — 82962 GLUCOSE BLOOD TEST: CPT | Mod: 91

## 2019-12-22 PROCEDURE — 770006 HCHG ROOM/CARE - MED/SURG/GYN SEMI*

## 2019-12-22 PROCEDURE — A9270 NON-COVERED ITEM OR SERVICE: HCPCS | Performed by: HOSPITALIST

## 2019-12-22 PROCEDURE — 700111 HCHG RX REV CODE 636 W/ 250 OVERRIDE (IP): Performed by: NURSE PRACTITIONER

## 2019-12-22 RX ADMIN — GABAPENTIN 200 MG: 100 CAPSULE ORAL at 20:52

## 2019-12-22 RX ADMIN — SODIUM BICARBONATE 650 MG: 650 TABLET ORAL at 04:47

## 2019-12-22 RX ADMIN — AMOXICILLIN AND CLAVULANATE POTASSIUM 1 TABLET: 875; 125 TABLET, FILM COATED ORAL at 16:59

## 2019-12-22 RX ADMIN — ACETAMINOPHEN 650 MG: 325 TABLET, FILM COATED ORAL at 09:26

## 2019-12-22 RX ADMIN — ACETAMINOPHEN 650 MG: 325 TABLET, FILM COATED ORAL at 20:52

## 2019-12-22 RX ADMIN — BUSPIRONE HYDROCHLORIDE 10 MG: 10 TABLET ORAL at 16:59

## 2019-12-22 RX ADMIN — SODIUM BICARBONATE 650 MG: 650 TABLET ORAL at 16:59

## 2019-12-22 RX ADMIN — FAMOTIDINE 20 MG: 20 TABLET ORAL at 04:47

## 2019-12-22 RX ADMIN — MYCOPHENOLATE MOFETIL 1000 MG: 250 CAPSULE ORAL at 05:48

## 2019-12-22 RX ADMIN — GABAPENTIN 200 MG: 100 CAPSULE ORAL at 10:49

## 2019-12-22 RX ADMIN — IBUPROFEN 800 MG: 800 TABLET, FILM COATED ORAL at 14:10

## 2019-12-22 RX ADMIN — BUDESONIDE AND FORMOTEROL FUMARATE DIHYDRATE 2 PUFF: 160; 4.5 AEROSOL RESPIRATORY (INHALATION) at 04:48

## 2019-12-22 RX ADMIN — FLUOXETINE HYDROCHLORIDE 40 MG: 20 CAPSULE ORAL at 04:59

## 2019-12-22 RX ADMIN — POTASSIUM CHLORIDE 20 MEQ: 20 TABLET, EXTENDED RELEASE ORAL at 04:46

## 2019-12-22 RX ADMIN — ZIPRASIDONE HYDROCHLORIDE 80 MG: 80 CAPSULE ORAL at 04:46

## 2019-12-22 RX ADMIN — GABAPENTIN 200 MG: 100 CAPSULE ORAL at 16:59

## 2019-12-22 RX ADMIN — IBUPROFEN 800 MG: 800 TABLET, FILM COATED ORAL at 23:39

## 2019-12-22 RX ADMIN — GABAPENTIN 200 MG: 100 CAPSULE ORAL at 11:41

## 2019-12-22 RX ADMIN — BUSPIRONE HYDROCHLORIDE 10 MG: 10 TABLET ORAL at 04:47

## 2019-12-22 RX ADMIN — ALBUTEROL SULFATE 2 PUFF: 90 AEROSOL, METERED RESPIRATORY (INHALATION) at 04:48

## 2019-12-22 RX ADMIN — MYCOPHENOLATE MOFETIL 1000 MG: 250 CAPSULE ORAL at 20:51

## 2019-12-22 RX ADMIN — ENOXAPARIN SODIUM 40 MG: 100 INJECTION SUBCUTANEOUS at 04:48

## 2019-12-22 RX ADMIN — PREGABALIN 300 MG: 100 CAPSULE ORAL at 04:46

## 2019-12-22 RX ADMIN — LEVOTHYROXINE SODIUM 75 MCG: 75 TABLET ORAL at 04:47

## 2019-12-22 RX ADMIN — PREGABALIN 300 MG: 100 CAPSULE ORAL at 16:59

## 2019-12-22 RX ADMIN — OXCARBAZEPINE 150 MG: 150 TABLET, FILM COATED ORAL at 17:00

## 2019-12-22 RX ADMIN — POTASSIUM CHLORIDE 20 MEQ: 20 TABLET, EXTENDED RELEASE ORAL at 16:59

## 2019-12-22 RX ADMIN — AMOXICILLIN AND CLAVULANATE POTASSIUM 1 TABLET: 875; 125 TABLET, FILM COATED ORAL at 04:48

## 2019-12-22 RX ADMIN — MODAFINIL 100 MG: 100 TABLET ORAL at 04:48

## 2019-12-22 RX ADMIN — ZIPRASIDONE HYDROCHLORIDE 80 MG: 80 CAPSULE ORAL at 16:59

## 2019-12-22 RX ADMIN — MECLIZINE HYDROCHLORIDE 12.5 MG: 25 TABLET ORAL at 11:41

## 2019-12-22 RX ADMIN — BUDESONIDE AND FORMOTEROL FUMARATE DIHYDRATE 2 PUFF: 160; 4.5 AEROSOL RESPIRATORY (INHALATION) at 17:07

## 2019-12-22 RX ADMIN — TIOTROPIUM BROMIDE 1 CAPSULE: 18 CAPSULE ORAL; RESPIRATORY (INHALATION) at 04:46

## 2019-12-22 RX ADMIN — PREDNISONE 10 MG: 10 TABLET ORAL at 04:47

## 2019-12-22 RX ADMIN — TRAZODONE HYDROCHLORIDE 50 MG: 100 TABLET ORAL at 16:59

## 2019-12-22 RX ADMIN — ASPIRIN 81 MG: 81 TABLET, COATED ORAL at 04:48

## 2019-12-22 RX ADMIN — FUROSEMIDE 80 MG: 40 TABLET ORAL at 04:47

## 2019-12-22 RX ADMIN — OXCARBAZEPINE 150 MG: 150 TABLET, FILM COATED ORAL at 04:46

## 2019-12-22 ASSESSMENT — ENCOUNTER SYMPTOMS
NERVOUS/ANXIOUS: 0
PHOTOPHOBIA: 0
TINGLING: 0
NECK PAIN: 0
BLOOD IN STOOL: 0
VOMITING: 0
TREMORS: 0
NAUSEA: 1
ORTHOPNEA: 0
EYE PAIN: 0
DEPRESSION: 0
SENSORY CHANGE: 0
MEMORY LOSS: 0
DOUBLE VISION: 0
PND: 0
MYALGIAS: 1
FEVER: 0
SORE THROAT: 0
HEADACHES: 0
COUGH: 0
PALPITATIONS: 0
CLAUDICATION: 0
HEARTBURN: 0
BACK PAIN: 1
SPUTUM PRODUCTION: 0
DIZZINESS: 0
DIARRHEA: 0
HEMOPTYSIS: 0
CHILLS: 0
WEAKNESS: 0
SHORTNESS OF BREATH: 0
ABDOMINAL PAIN: 0
CONSTIPATION: 0
STRIDOR: 0
SPEECH CHANGE: 0
BLURRED VISION: 0

## 2019-12-22 NOTE — CARE PLAN
Problem: Safety  Goal: Will remain free from injury  Outcome: PROGRESSING AS EXPECTED  Goal: Will remain free from falls  Outcome: PROGRESSING AS EXPECTED     Problem: Infection  Goal: Will remain free from infection  Outcome: PROGRESSING AS EXPECTED     Problem: Respiratory:  Goal: Respiratory status will improve  Outcome: PROGRESSING AS EXPECTED     Problem: Medication  Goal: Compliance with prescribed medication will improve  Outcome: PROGRESSING AS EXPECTED     Problem: Knowledge Deficit  Goal: Knowledge of the prescribed therapeutic regimen will improve  Outcome: PROGRESSING AS EXPECTED  Goal: Knowledge of disease process/condition, treatment plan, diagnostic tests, and medications will improve  Outcome: PROGRESSING AS EXPECTED     Problem: Discharge Barriers/Planning  Goal: Patient's continuum of care needs will be met  Outcome: PROGRESSING AS EXPECTED     Problem: Communication  Goal: The ability to communicate needs accurately and effectively will improve  Outcome: PROGRESSING AS EXPECTED     Problem: Venous Thromboembolism (VTW)/Deep Vein Thrombosis (DVT) Prevention:  Goal: Patient will participate in Venous Thrombosis (VTE)/Deep Vein Thrombosis (DVT)Prevention Measures  Outcome: PROGRESSING AS EXPECTED     Problem: Bowel/Gastric:  Goal: Normal bowel function is maintained or improved  Outcome: PROGRESSING AS EXPECTED

## 2019-12-22 NOTE — PROGRESS NOTES
Today and yesterday pt was active in ambulating to bathroom, and was participatory in bedside exercises with this nurse.  Pt complaints discussed with MD.

## 2019-12-22 NOTE — PROGRESS NOTES
"Hospital Medicine Daily Progress Note    Date of Service  12/21/2019    Chief Complaint  29 y.o. female admitted 12/13/2019 with AMS    Hospital Course    per HPI  29 y.o. female who presented 12/13/2019 with altered level consciousness.  At this point in time, patient is mostly nonverbal, information obtained from her grandmother who was the one who found her.  She states whenever she left she was sleeping but just prior to this had been normal.  She states this morning she had some shortness of breath, she does have a history of asthma.  She did use breathing treatments and a home nurse helped with her breathing and it did seem to normalize.  Her grandmother left, was gone around 90 minutes, upon returning home she found her down, unconscious.  She did call 911.  She did attempt to wake up her granddaughter for quite some time and eventually she did wake up but seemed very groggy and confused.  She states she did just finish her antibiotics for her UTI/bronchitis, otherwise has been taking her usual home medications.  She has not been missing any medications nor taking extra of her medications.  I did discuss the case including labs and imaging with the ER physician.             Interval Problem Update  Patient is still complaining of mild nausea, having back spasms which is chronic, 7/10 in severity at times. Feels like she needs more time to feel better. Denies fevers/chills, denies dysuria or diarrhea.  12/17.   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 Newton, Neuro-Ophthalmology.  History of atrial fibrillation and cardiomyopathy? On Lasix, aspirin and ivadrabine. July 2019 echo: Normal left ventricular systolic function.  Left ventricular ejection fraction is visually estimated to be 65%.  Normal left ventricular wall " "thickness.  Normal left atrial size.  Trace mitral regurgitation.  Structurally normal aortic valve without significant stenosis or   regurgitation.  Estimated right ventricular systolic pressure  is 30 mmHg.  Trace tricuspid regurgitation.  Normal right ventricular size.  Also chronic pain and neuropathy  Patient fell per nursing and now mentions she has intractable R hip pain and can't walk. Ordered Hip XR and that turned out to be normal, no arthritis.  Later she mentioned she choked on a piece of meat and is short of breath, She however speaks full sentences, not hypoxic, not wheezing.   She is on several pain medications and anti-inflammatories; sees Pain Clinic and Dr. Cabral, Neuroophthalmology. She mentions she is on low dose steroids prescribed to her by Dr. Wilkins.  12/18. Patient \"fell to knees\" again.  Discussed with Dr. Sabillon in depth.  12/19. Unchanged nonspecific complaints of pain and weakness.  I called dr. Chowdary, who is following her for optic neuritis.  I then called Dr. Rincon, and Cande Jennings, Neurology  12/20. No issues or complaints currently. Grandmother at bedside with questions.  SW/Palliative asking about competency eval as patient desires to be DNR.  For now I tony josemanuel Speech for cognitive eval. Psych has been involved so may need formal neurocognitive testing for competency  12/21. No new issues or concerns when I saw her  Later nurse reports she is having \"vision problems\" similar to optic neuritis flare. Of note, she is on high dose prednisone.       Consultants/Specialty  Psychiatry    Code Status  Full code    Disposition  PT/OT reevaluated.  Because of fall risk they recommend rehab or SNF.  Grandmother wanted her home however she is 80 years old and she did not argue when patient pointed that out.  12/18. Plan for rehab or SNF  12/20. Will need to finish high dose steroid trial and have formal neurocognitive eval for competency.    Review of Systems  Review of Systems "   Constitutional: Positive for malaise/fatigue. Negative for chills and fever.   HENT: Negative for congestion, hearing loss, sore throat and tinnitus.    Eyes: Negative for blurred vision, double vision, photophobia and pain.   Respiratory: Negative for cough, hemoptysis, sputum production, shortness of breath and stridor.    Cardiovascular: Negative for chest pain, palpitations, orthopnea, claudication and PND.   Gastrointestinal: Positive for nausea. Negative for abdominal pain, blood in stool, constipation, diarrhea, heartburn, melena and vomiting.   Genitourinary: Negative for dysuria, frequency and urgency.   Musculoskeletal: Positive for back pain (spasms) and myalgias. Negative for neck pain.   Neurological: Negative for dizziness, tingling, tremors, sensory change, speech change, weakness and headaches.   Psychiatric/Behavioral: Negative for depression, memory loss and suicidal ideas. The patient is not nervous/anxious.    All other systems reviewed and are negative.       Physical Exam  Temp:  [36.4 °C (97.5 °F)-37.2 °C (98.9 °F)] 36.4 °C (97.5 °F)  Pulse:  [82-84] 82  Resp:  [15-18] 18  BP: (113-119)/(60-66) 113/60  SpO2:  [86 %-97 %] 97 %    Physical Exam  Vitals signs and nursing note reviewed.   Constitutional:       General: She is not in acute distress.     Appearance: Normal appearance. She is obese. She is not ill-appearing.   HENT:      Head: Normocephalic and atraumatic.      Right Ear: Tympanic membrane normal.      Left Ear: Tympanic membrane normal.      Nose: No congestion.      Mouth/Throat:      Mouth: Mucous membranes are dry.      Pharynx: Oropharynx is clear. No oropharyngeal exudate or posterior oropharyngeal erythema.   Eyes:      Extraocular Movements: Extraocular movements intact.      Conjunctiva/sclera: Conjunctivae normal.      Pupils: Pupils are equal, round, and reactive to light.      Comments: Monspecific vision complaints.   Neck:      Musculoskeletal: Normal range of motion  and neck supple. No neck rigidity.   Cardiovascular:      Rate and Rhythm: Normal rate.      Pulses: Normal pulses.      Heart sounds: No murmur.   Pulmonary:      Effort: Pulmonary effort is normal. No respiratory distress.      Breath sounds: Normal breath sounds. No wheezing or rales.   Abdominal:      General: Abdomen is flat. Bowel sounds are normal. There is no distension.      Palpations: Abdomen is soft.      Tenderness: There is no tenderness. There is no guarding.   Musculoskeletal: Normal range of motion.         General: Tenderness (nonspecific myalgias) present. No swelling.      Comments: Nonspecific myalgias, arthralgias out of proportion to exam   Skin:     General: Skin is warm and dry.      Capillary Refill: Capillary refill takes less than 2 seconds.      Coloration: Skin is not pale.   Neurological:      General: No focal deficit present.      Mental Status: She is alert and oriented to person, place, and time. Mental status is at baseline.      Cranial Nerves: No cranial nerve deficit.      Comments: Odd insight at times   Psychiatric:      Comments: Anxious and needy behavior  Mentioned she has auditory hallucinations  12/20. Currently more stable moods.  12/21. Anxious again.         Fluids    Intake/Output Summary (Last 24 hours) at 12/21/2019 1647  Last data filed at 12/21/2019 0800  Gross per 24 hour   Intake 320 ml   Output --   Net 320 ml       Laboratory                        Imaging  DX-HIP-UNILATERAL-WITH PELVIS-1 VIEW RIGHT   Final Result      No acute osseous abnormality.      CT-HEAD W/O   Final Result         1.  No acute intracranial abnormality.   2.  Stable partial opacification of left mastoid air cells, consider effusion or component of mastoiditis as clinically appropriate      DX-CHEST-PORTABLE (1 VIEW)   Final Result      No evidence of acute cardiopulmonary process.           Assessment/Plan  * Altered level of consciousness- (present on admission)  Assessment &  "Plan  Highly suspect this is secondary to polypharmacy, patient is on a lot of sedating medications  She does have a history of accidental overdose in the past but denies this  No seizure activity noted, no evidence of rhabdomyolysis  Currently mentation has resolved, is AAOx4 past 2 days.  He had a long discussion in terms of decreasing her sedating medications.  12/17. No changes in her pain regimen and sedatives  Follow up to her Pain Clinic.  12/18. Discussed with Dr. Sabillon  Not very clear diagnoses but managed for functional transverse myelitis, possible optic neuritis, seen by Dr. Newton  Spine stimulator limits further evaluation for other causes of enceiphalopathy/weakness/falls such as MRIs.  Taper of gabapentin and transition to Trileptal  Modenalfil also started  Monitor her symptoms of weakness causing her to fall  12/19. May have a functional component but Dr. Chowdary feels there could be neurologic disease  He recommended formal neurology consult to r/o transverse myelitis or CNS lesions and see if it is acceptable to get a CT myelogram since MRI is contraindicated due to her gastric pacer/stimulator  Discussed with Neurology.   Has had CT myelogram in the past per their records and seen Dr. Fox. Has been nondiagnostic.  Currently no other possible myelopathy diagnosis.  Symptomatology also inconsistent as she has reflexes  She has been worked up for myopathies; on Dr. Fox's notes in the past, muscle biopsy was nondiagnostic.  In the past IV Solumedrol was given and per Dr. Fox, shpowed some benefit for a diagnosis of \"paralysis\" and \"lower motor neuron etiology\". According to patient she [facility] and she may possibly have a mitochondrial type mypopathy or neuromyopathy. This was 2 years ago however and recently she has been experiencing weakness and falls again.  Currently agree that no need for CT myelogram right now, will evaluate if high dose " steroids will be of any benefit otherwise she will have to follow-up with Dr. Fox.  Ordered 3d course of Solu-medrol to see if there is any benefit, as Neurology recommended no harm to try what worked in the past.  12/20.  Discussed plan with grandmother and patient  Will do the steroid trial. Poor IV access so switch to prednisone 60x 1 followe dby pred 10 as per Dr. 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 Inpatient Neurology has no further comment and recommnd following Dr. Fox, neurology for further management  Meanwhile grandmother admits she cannot care for her 24/7 so she is awaiting SNF/rehab/ SW working on it.    Advanced care planning/counseling discussion  Assessment & Plan  12/20. Patient has indicated to Palliative that she wants to be DNR  Currently she does not have a diagnosis to say that she is terminal  She also has been followed by Psychiatry and has had altered mentation in the past  I explained to Palliative and SW that a formal neuro cognitive eval needs to be done for competency as I can only assess capacity  SLP to do cognitive eval and will let me know if formal cognitive eval needed, and if so I will consult Psychology.  12/21. Speech for cognitive eval, and if they recommend formal testing for competency will have Psychology evaluate.,  By definition:   Competency is a global assessment and legal determination made by a  in court. Capacity is a functional assessment and a clinical determination about a specific decision that can be made by any clinician familiar with a patient’s case. Hospitalists frequently encounter situations in which a patient’s capacity is called into question; in most cases, this is a determination a hospitalist can make independent of consultants.      Morbidly obese (HCC)- (present on admission)  Assessment & Plan  Body mass index is 46.26  kg/m².  Encourage healthy lifestyle and physical activity.    History of optic neuritis  Assessment & Plan  Per her report and she follows Dr. Wilkins.    ASTHMA  Assessment & Plan  Controlled on Symbicort, montelukast  Continue RT protocol, duo nebs, Pep therapy if warranted, and incentive spirometry.       Mastoiditis  Assessment & Plan  CT head shows Stable partial opacification of left mastoid air cells, consider effusion or component of mastoiditis as clinically appropriate  Resume augmentin.    Immunocompromised (HCC)- (present on admission)  Assessment & Plan  On steroids and immunosuppressants for optic neuritis followed byu Dr. Newton, NeuroOphthalmology    Acquired hypothyroidism- (present on admission)  Assessment & Plan  Resume home dose of Synthroid  Last month her TSH was within normal limits    Type 2 diabetes mellitus with hyperglycemia, without long-term current use of insulin ([facility])- (present on admission)  Assessment & Plan  Continue Insulin-sliding scale, accu-checks and hypoglycemia protocol.  Continue with Consistent Carbohydrate diet   Hold home dose of trulicity     Depression- (present on admission)  Assessment & Plan  Resume home dose of Prozac    GERD (gastroesophageal reflux disease)- (present on admission)  Assessment & Plan  Continue with Pepcid    Borderline personality disorder in adult (HCC)- (present on admission)  Assessment & Plan  Continue with home dose of Prozac, Geodon and trazodone      Patient plan of care discussed at multidisplinary team rounds and with patient and R.N at [facility].      VTE prophylaxis: Lovenox     Reviewed vitals, labs, imaging, staff notes.  Discussed assessment and plan with Kristin Balderrama   Discussed with RN and social work  Discussed with Psychiatry  Discussed with Neurology  Discussed with Palliative.

## 2019-12-23 ENCOUNTER — APPOINTMENT (OUTPATIENT)
Dept: RADIOLOGY | Facility: MEDICAL CENTER | Age: 29
DRG: 074 | End: 2019-12-23
Attending: INTERNAL MEDICINE
Payer: MEDICARE

## 2019-12-23 LAB
ANION GAP SERPL CALC-SCNC: 8 MMOL/L (ref 0–11.9)
BUN SERPL-MCNC: 17 MG/DL (ref 8–22)
CALCIUM SERPL-MCNC: 9 MG/DL (ref 8.5–10.5)
CHLORIDE SERPL-SCNC: 106 MMOL/L (ref 96–112)
CO2 SERPL-SCNC: 25 MMOL/L (ref 20–33)
CREAT SERPL-MCNC: 0.75 MG/DL (ref 0.5–1.4)
ERYTHROCYTE [DISTWIDTH] IN BLOOD BY AUTOMATED COUNT: 42.9 FL (ref 35.9–50)
GLUCOSE BLD-MCNC: 106 MG/DL (ref 65–99)
GLUCOSE BLD-MCNC: 112 MG/DL (ref 65–99)
GLUCOSE BLD-MCNC: 130 MG/DL (ref 65–99)
GLUCOSE BLD-MCNC: 91 MG/DL (ref 65–99)
GLUCOSE SERPL-MCNC: 89 MG/DL (ref 65–99)
HCT VFR BLD AUTO: 40.9 % (ref 37–47)
HGB BLD-MCNC: 13.4 G/DL (ref 12–16)
MCH RBC QN AUTO: 30.4 PG (ref 27–33)
MCHC RBC AUTO-ENTMCNC: 32.8 G/DL (ref 33.6–35)
MCV RBC AUTO: 92.7 FL (ref 81.4–97.8)
PLATELET # BLD AUTO: 145 K/UL (ref 164–446)
PMV BLD AUTO: 11 FL (ref 9–12.9)
POTASSIUM SERPL-SCNC: 3.4 MMOL/L (ref 3.6–5.5)
RBC # BLD AUTO: 4.41 M/UL (ref 4.2–5.4)
SODIUM SERPL-SCNC: 139 MMOL/L (ref 135–145)
WBC # BLD AUTO: 7.4 K/UL (ref 4.8–10.8)

## 2019-12-23 PROCEDURE — 85027 COMPLETE CBC AUTOMATED: CPT

## 2019-12-23 PROCEDURE — 700111 HCHG RX REV CODE 636 W/ 250 OVERRIDE (IP): Performed by: NURSE PRACTITIONER

## 2019-12-23 PROCEDURE — A9270 NON-COVERED ITEM OR SERVICE: HCPCS | Performed by: HOSPITALIST

## 2019-12-23 PROCEDURE — A9270 NON-COVERED ITEM OR SERVICE: HCPCS | Performed by: INTERNAL MEDICINE

## 2019-12-23 PROCEDURE — 700102 HCHG RX REV CODE 250 W/ 637 OVERRIDE(OP): Performed by: INTERNAL MEDICINE

## 2019-12-23 PROCEDURE — 700111 HCHG RX REV CODE 636 W/ 250 OVERRIDE (IP): Performed by: INTERNAL MEDICINE

## 2019-12-23 PROCEDURE — 770006 HCHG ROOM/CARE - MED/SURG/GYN SEMI*

## 2019-12-23 PROCEDURE — 82962 GLUCOSE BLOOD TEST: CPT | Mod: 91

## 2019-12-23 PROCEDURE — 99233 SBSQ HOSP IP/OBS HIGH 50: CPT | Performed by: INTERNAL MEDICINE

## 2019-12-23 PROCEDURE — 80048 BASIC METABOLIC PNL TOTAL CA: CPT

## 2019-12-23 PROCEDURE — 97530 THERAPEUTIC ACTIVITIES: CPT

## 2019-12-23 PROCEDURE — 700102 HCHG RX REV CODE 250 W/ 637 OVERRIDE(OP): Performed by: PSYCHIATRY & NEUROLOGY

## 2019-12-23 PROCEDURE — 99232 SBSQ HOSP IP/OBS MODERATE 35: CPT | Mod: GC | Performed by: PSYCHIATRY & NEUROLOGY

## 2019-12-23 PROCEDURE — 700102 HCHG RX REV CODE 250 W/ 637 OVERRIDE(OP): Performed by: HOSPITALIST

## 2019-12-23 PROCEDURE — 92610 EVALUATE SWALLOWING FUNCTION: CPT

## 2019-12-23 PROCEDURE — 36415 COLL VENOUS BLD VENIPUNCTURE: CPT

## 2019-12-23 PROCEDURE — A9270 NON-COVERED ITEM OR SERVICE: HCPCS | Performed by: PSYCHIATRY & NEUROLOGY

## 2019-12-23 PROCEDURE — 70450 CT HEAD/BRAIN W/O DYE: CPT

## 2019-12-23 PROCEDURE — 97116 GAIT TRAINING THERAPY: CPT

## 2019-12-23 RX ADMIN — SODIUM BICARBONATE 650 MG: 650 TABLET ORAL at 05:45

## 2019-12-23 RX ADMIN — GABAPENTIN 200 MG: 100 CAPSULE ORAL at 09:15

## 2019-12-23 RX ADMIN — ACETAMINOPHEN 650 MG: 325 TABLET, FILM COATED ORAL at 05:52

## 2019-12-23 RX ADMIN — SODIUM BICARBONATE 650 MG: 650 TABLET ORAL at 17:29

## 2019-12-23 RX ADMIN — GABAPENTIN 200 MG: 100 CAPSULE ORAL at 14:42

## 2019-12-23 RX ADMIN — ASPIRIN 81 MG: 81 TABLET, COATED ORAL at 05:45

## 2019-12-23 RX ADMIN — SENNOSIDES AND DOCUSATE SODIUM 2 TABLET: 8.6; 5 TABLET ORAL at 05:42

## 2019-12-23 RX ADMIN — TRAZODONE HYDROCHLORIDE 50 MG: 100 TABLET ORAL at 17:29

## 2019-12-23 RX ADMIN — PREGABALIN 300 MG: 100 CAPSULE ORAL at 17:27

## 2019-12-23 RX ADMIN — AMOXICILLIN AND CLAVULANATE POTASSIUM 1 TABLET: 875; 125 TABLET, FILM COATED ORAL at 17:30

## 2019-12-23 RX ADMIN — DIPHENHYDRAMINE HYDROCHLORIDE 25 MG: 25 TABLET ORAL at 21:52

## 2019-12-23 RX ADMIN — IBUPROFEN 800 MG: 800 TABLET, FILM COATED ORAL at 21:52

## 2019-12-23 RX ADMIN — GABAPENTIN 200 MG: 100 CAPSULE ORAL at 21:52

## 2019-12-23 RX ADMIN — MECLIZINE HYDROCHLORIDE 12.5 MG: 25 TABLET ORAL at 09:15

## 2019-12-23 RX ADMIN — ONDANSETRON 4 MG: 4 TABLET, ORALLY DISINTEGRATING ORAL at 07:35

## 2019-12-23 RX ADMIN — LEVOTHYROXINE SODIUM 75 MCG: 75 TABLET ORAL at 05:43

## 2019-12-23 RX ADMIN — ZIPRASIDONE HYDROCHLORIDE 80 MG: 80 CAPSULE ORAL at 05:52

## 2019-12-23 RX ADMIN — OXCARBAZEPINE 150 MG: 150 TABLET, FILM COATED ORAL at 05:44

## 2019-12-23 RX ADMIN — OXCARBAZEPINE 150 MG: 150 TABLET, FILM COATED ORAL at 17:39

## 2019-12-23 RX ADMIN — BUSPIRONE HYDROCHLORIDE 10 MG: 10 TABLET ORAL at 05:43

## 2019-12-23 RX ADMIN — MYCOPHENOLATE MOFETIL 1000 MG: 250 CAPSULE ORAL at 05:52

## 2019-12-23 RX ADMIN — BUDESONIDE AND FORMOTEROL FUMARATE DIHYDRATE 2 PUFF: 160; 4.5 AEROSOL RESPIRATORY (INHALATION) at 17:40

## 2019-12-23 RX ADMIN — GABAPENTIN 200 MG: 100 CAPSULE ORAL at 17:28

## 2019-12-23 RX ADMIN — AMOXICILLIN AND CLAVULANATE POTASSIUM 1 TABLET: 875; 125 TABLET, FILM COATED ORAL at 05:44

## 2019-12-23 RX ADMIN — PREDNISONE 10 MG: 10 TABLET ORAL at 05:43

## 2019-12-23 RX ADMIN — BUDESONIDE AND FORMOTEROL FUMARATE DIHYDRATE 2 PUFF: 160; 4.5 AEROSOL RESPIRATORY (INHALATION) at 05:45

## 2019-12-23 RX ADMIN — FAMOTIDINE 20 MG: 20 TABLET ORAL at 05:44

## 2019-12-23 RX ADMIN — PREGABALIN 300 MG: 100 CAPSULE ORAL at 05:42

## 2019-12-23 RX ADMIN — MODAFINIL 100 MG: 100 TABLET ORAL at 05:43

## 2019-12-23 RX ADMIN — FLUOXETINE HYDROCHLORIDE 40 MG: 20 CAPSULE ORAL at 05:43

## 2019-12-23 RX ADMIN — FUROSEMIDE 80 MG: 40 TABLET ORAL at 05:45

## 2019-12-23 RX ADMIN — MYCOPHENOLATE MOFETIL 1000 MG: 250 CAPSULE ORAL at 17:44

## 2019-12-23 RX ADMIN — ENOXAPARIN SODIUM 40 MG: 100 INJECTION SUBCUTANEOUS at 05:44

## 2019-12-23 RX ADMIN — BUSPIRONE HYDROCHLORIDE 10 MG: 10 TABLET ORAL at 17:27

## 2019-12-23 RX ADMIN — POTASSIUM CHLORIDE 20 MEQ: 20 TABLET, EXTENDED RELEASE ORAL at 05:43

## 2019-12-23 RX ADMIN — SENNOSIDES AND DOCUSATE SODIUM 2 TABLET: 8.6; 5 TABLET ORAL at 17:30

## 2019-12-23 RX ADMIN — ZIPRASIDONE HYDROCHLORIDE 80 MG: 80 CAPSULE ORAL at 17:39

## 2019-12-23 RX ADMIN — TIOTROPIUM BROMIDE 1 CAPSULE: 18 CAPSULE ORAL; RESPIRATORY (INHALATION) at 05:44

## 2019-12-23 RX ADMIN — IBUPROFEN 800 MG: 800 TABLET, FILM COATED ORAL at 11:59

## 2019-12-23 RX ADMIN — POTASSIUM CHLORIDE 20 MEQ: 20 TABLET, EXTENDED RELEASE ORAL at 17:31

## 2019-12-23 ASSESSMENT — ENCOUNTER SYMPTOMS
MEMORY LOSS: 0
BLURRED VISION: 0
DIZZINESS: 0
VOMITING: 0
DEPRESSION: 0
BACK PAIN: 1
MYALGIAS: 0
HEARTBURN: 0
FEVER: 0
SHORTNESS OF BREATH: 0
COUGH: 0
BLURRED VISION: 1
CONSTIPATION: 0
NERVOUS/ANXIOUS: 0
NAUSEA: 1
TREMORS: 0
STRIDOR: 0
TINGLING: 1
BACK PAIN: 0
PND: 0
DIARRHEA: 0
SPUTUM PRODUCTION: 0
WEAKNESS: 0
CLAUDICATION: 0
PALPITATIONS: 0
MYALGIAS: 1
WEAKNESS: 1
HEADACHES: 0
NECK PAIN: 0
ORTHOPNEA: 0
SORE THROAT: 0
DOUBLE VISION: 0
SPEECH CHANGE: 0
PHOTOPHOBIA: 0
CONSTIPATION: 1
ABDOMINAL PAIN: 0
SENSORY CHANGE: 0
TINGLING: 0
SENSORY CHANGE: 1
EYE PAIN: 0
HEMOPTYSIS: 0
CHILLS: 0
BLOOD IN STOOL: 0

## 2019-12-23 ASSESSMENT — COGNITIVE AND FUNCTIONAL STATUS - GENERAL
SUGGESTED CMS G CODE MODIFIER MOBILITY: CK
MOVING FROM LYING ON BACK TO SITTING ON SIDE OF FLAT BED: A LOT
WALKING IN HOSPITAL ROOM: A LOT
CLIMB 3 TO 5 STEPS WITH RAILING: TOTAL
MOBILITY SCORE: 16
STANDING UP FROM CHAIR USING ARMS: A LITTLE

## 2019-12-23 ASSESSMENT — GAIT ASSESSMENTS
ASSISTIVE DEVICE: FRONT WHEEL WALKER
DISTANCE (FEET): 15
GAIT LEVEL OF ASSIST: MODERATE ASSIST

## 2019-12-23 NOTE — CARE PLAN
Problem: Safety  Goal: Will remain free from injury  Outcome: PROGRESSING AS EXPECTED     Problem: Infection  Goal: Will remain free from infection  Outcome: PROGRESSING AS EXPECTED     Problem: Respiratory:  Goal: Respiratory status will improve  Outcome: PROGRESSING AS EXPECTED    Sleep apnea, on  at night, maintaining sats > 90% without O2

## 2019-12-23 NOTE — CONSULTS
Neurology Initial Consult H&P  Neurohospitalist Service, [facility]    Referring Physician: Dmitriy Harper M.D.    Chief Complaint   Patient presents with   • Syncope     found down by mother, states that she last saw her 30min prior.    • Dizziness       HPI: Kristin Balderrama is a 29 y.o. female with history of optic neuritis presenting to the hospital for blurred vision and facial sensation changes and consulted for management    Review of systems: In addition to what is detailed in the HPI above, (and scanned into the chart if and when applicable), all other systems reviewed and are negative.    Past Medical History:    has a past medical history of Abdominal pain, Anginal syndrome, Apnea, sleep, Arrhythmia, Arthritis, ASTHMA, Atrial fibrillation (HCC), Back pain, Borderline personality disorder (HCC), Breath shortness, Bronchitis, Cardiac arrhythmia, Chickenpox, Chronic UTI (9/18/2010), Cough, Daytime sleepiness, Depression, Diabetes (HCC), Diarrhea, Disorder of thyroid, Fall, Fatigue, Frequent headaches, Gasping for breath, Gynecological disorder, Headache(784.0), Heart burn, History of falling, Hypertension, Indigestion, Migraine, 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HCC), 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HCC), Nausea, Obesity, Pain (08-15-12), Painful joint, PCOS (polycystic ovarian syndrome), Pneumonia (2012), Psychosis (HCC), Renal disorder, Ringing in ears, Scoliosis, Shortness of breath, Sinus tachycardia (10/31/2013), Sleep apnea, Snoring, Tonsillitis, Tuberculosis, Urinary bladder disorder, Urinary incontinence, Weakness, and Wears glasses.    FHx:  family history includes Genitourinary () Problems in her sister; Heart Disease in her maternal grandmother and mother; Hypertension in her maternal grandmother, maternal uncle, and mother; No Known Problems in her sister; Other in her mother and sister; Sleep Apnea in her mother.    SHx:   reports that she has never smoked. She has never  "used smokeless tobacco. She reports current drug use. Frequency: 7.00 times per week. Drugs: Marijuana and Oral. She reports that she does not drink alcohol.    Allergies:  Allergies   Allergen Reactions   • Cefdinir Shortness of Breath and Itching     Tolerated 1/18/17  Tolerates ceftriaxone  Tolerated augmentin 8/2019    • Depakote [Divalproex Sodium] Unspecified     Muscle spasms/muscle pain and weakness     • Doxycycline Anaphylaxis and Vomiting     RXN=unknown   • Amitriptyline Unspecified     Headaches     • Aripiprazole [Abilify] Unspecified     Headaches/muscle twitching     • Clindamycin Nausea     Even with food     • Ees [Erythromycin] Vomiting and Nausea   • Flagyl [Metronidazole Hcl] Unspecified     \"eye problems\"     • Flomax [Tamsulosin Hydrochloride] Swelling   • Levaquin Unspecified     Severe muscle cramps in legs  RXN=unknown   • Metformin Unspecified     Increased lactic acid      • Tape Rash     Tears skin off  coban with Tegaderm tape ok intermittently  RXN=ongoing   • Vancomycin Itching     Pt becomes flushed in face and chest.   RXN=7/10/16   • Wound Dressing Adhesive Hives     By pt report   • Cephalexin [Keflex] Rash     Pt states she gets a rash with this medication  Tolerates ceftriaxone       Medications:    Current Facility-Administered Medications:   •  predniSONE (DELTASONE) tablet 10 mg, 10 mg, Oral, DAILY, Dmitriy Harper M.D., 10 mg at 12/23/19 0543  •  OXcarbazepine (TRILEPTAL) tablet 150 mg, 150 mg, Oral, BID, Alexandria Sigala M.D., 150 mg at 12/23/19 0544  •  gabapentin (NEURONTIN) capsule 200 mg, 200 mg, Oral, 4X/DAY, Alexandria Sigala M.D., 200 mg at 12/23/19 0915  •  traZODone (DESYREL) tablet 50 mg, 50 mg, Oral, Q EVENING, Alexandria Sigala M.D., 50 mg at 12/22/19 1659  •  modafinil (PROVIGIL) tablet 100 mg, 100 mg, Oral, QAM, Alexandria Sigala M.D., 100 mg at 12/23/19 0543  •  ibuprofen (MOTRIN) tablet 800 mg, 800 mg, Oral, TID " PRN, Dmitriy Harper M.D., 800 mg at 12/22/19 2339  •  pregabalin (LYRICA) capsule 300 mg, 300 mg, Oral, BID, ABBY YoonO., 300 mg at 12/23/19 0542  •  Ivabradine HCl TABS 7.5 mg, 7.5 mg, Oral, BID, Beto Hendrix M.D., 7.5 mg at 12/23/19 0554  •  meclizine (ANTIVERT) tablet 12.5 mg, 12.5 mg, Oral, TID PRN, Beto Hendrix M.D., 12.5 mg at 12/23/19 0915  •  amoxicillin-clavulanate (AUGMENTIN) 875-125 MG per tablet 1 Tab, 1 Tab, Oral, Q12HRS, Earl Meehan M.D., 1 Tab at 12/23/19 0544  •  albuterol inhaler 2 Puff, 2 Puff, Inhalation, Q4HRS PRN, ABBY YoonOEffie, 2 Puff at 12/22/19 0448  •  albuterol (PROVENTIL) 2.5mg/0.5ml nebulizer solution 2.5 mg, 2.5 mg, Nebulization, Q4H PRN (RT), ABBY YoonO., 2.5 mg at 12/18/19 1623  •  enoxaparin (LOVENOX) inj 40 mg, 40 mg, Subcutaneous, DAILY, VANIA Sanon, 40 mg at 12/23/19 0544  •  aspirin EC (ECOTRIN) tablet 81 mg, 81 mg, Oral, DAILY, ABBY YoonO., 81 mg at 12/23/19 0545  •  budesonide-formoterol (SYMBICORT) 160-4.5 MCG/ACT inhaler 2 Puff, 2 Puff, Inhalation, BID, ABBY YoonO., 2 Puff at 12/23/19 0545  •  busPIRone (BUSPAR) tablet 10 mg, 10 mg, Oral, BID, ABBY YoonO., 10 mg at 12/23/19 0543  •  FLUoxetine (PROZAC) capsule 40 mg, 40 mg, Oral, DAILY, John Albrecht D.O., 40 mg at 12/23/19 0543  •  furosemide (LASIX) tablet 80 mg, 80 mg, Oral, DAILY, John Albrecht D.O., 80 mg at 12/23/19 0545  •  levothyroxine (SYNTHROID) tablet 75 mcg, 75 mcg, Oral, AM ES, John Albrecht D.O., 75 mcg at 12/23/19 0543  •  mycophenolate (CELLCEPT) capsule 1,000 mg, 1,000 mg, Oral, BID, John Albrecht D.O., 1,000 mg at 12/23/19 0552  •  potassium chloride SA (Kdur) tablet 20 mEq, 20 mEq, Oral, BID, John Albrecht D.O., 20 mEq at 12/23/19 0543  •  famotidine (PEPCID) tablet 20 mg, 20 mg, Oral, QAM, John Albrecht D.O., 20 mg at 12/23/19 0544  •  sodium bicarbonate tablet 650 mg, 650 mg, Oral, BID, John Albrecht,  D.O., 650 mg at 12/23/19 0545  •  tiotropium (SPIRIVA) 18 MCG inhalation capsule 1 Cap, 1 Cap, Inhalation, DAILY, John Albrecht D.O., 1 Cap at 12/23/19 0544  •  ziprasidone (GEODON) capsule 80 mg, 80 mg, Oral, BID, ANTONI Yoon.O., 80 mg at 12/23/19 0552  •  senna-docusate (PERICOLACE or SENOKOT S) 8.6-50 MG per tablet 2 Tab, 2 Tab, Oral, BID, 2 Tab at 12/23/19 0542 **AND** polyethylene glycol/lytes (MIRALAX) PACKET 1 Packet, 1 Packet, Oral, QDAY PRN, 1 Packet at 12/14/19 1714 **AND** magnesium hydroxide (MILK OF MAGNESIA) suspension 30 mL, 30 mL, Oral, QDAY PRN **AND** bisacodyl (DULCOLAX) suppository 10 mg, 10 mg, Rectal, QDAY PRN, ANTONI Yoon.O.  •  acetaminophen (TYLENOL) tablet 650 mg, 650 mg, Oral, Q6HRS PRN, ANTONI Yoon.O., 650 mg at 12/23/19 0552  •  enalaprilat (VASOTEC) injection 1.25 mg, 1.25 mg, Intravenous, Q6HRS PRN, ANTONI Yoon.O.  •  ondansetron (ZOFRAN ODT) dispertab 4 mg, 4 mg, Oral, Q4HRS PRN, ANTONI Yoon.O., 4 mg at 12/23/19 0735  •  insulin regular (HUMULIN R) injection 1-6 Units, 1-6 Units, Subcutaneous, 4X/DAY ACHS, Stopped at 12/21/19 0700 **AND** Accu-Chek ACHS, , , Q AC AND BEDTIME(S) **AND** NOTIFY MD and PharmD, , , Once **AND** glucose 4 g chewable tablet 16 g, 16 g, Oral, Q15 MIN PRN **AND** DEXTROSE 10% BOLUS 250 mL, 250 mL, Intravenous, Q15 MIN PRN, ANTONI Yoon.O.  •  acetaminophen/caffeine/butalbital 325-40-50 mg (FIORICET) -40 MG per tablet 1 Tab, 1 Tab, Oral, Q6HRS PRN, John Albrecht D.O., 1 Tab at 12/21/19 2121  •  diphenhydrAMINE (BENADRYL) tablet/capsule 25 mg, 25 mg, Oral, HS PRN, ABBY YoonO., 25 mg at 12/20/19 2029  •  Respiratory Care per Protocol, , Nebulization, Continuous RT, John Albrecht D.O.    Physical Examination:     Vitals:    12/22/19 1600 12/22/19 2000 12/23/19 0400 12/23/19 0730   BP: 112/57 123/66 137/79 128/72   Pulse: 78 77 70 78   Resp: 18 20 20 19   Temp: (!) 35.8 °C (96.4 °F) 36.2 °C (97.1  °F) 36.1 °C (97 °F) 36.4 °C (97.6 °F)   TempSrc: Temporal Temporal Temporal Temporal   SpO2: 92% 96% 98% 96%   Weight:       Height:           General: Patient is awake and in no acute distress  Eyes: examination of optic disks not indicated at this time  CV: RRR    NEUROLOGICAL EXAM:     Mental status: Awake, alert and fully oriented, follows commands  Speech and language: speech is clear and fluent. The patient is able to name and repeat.  Cranial nerve exam: Pupils are equal, round and reactive to light bilaterally. Visual fields are full. Decreased acuity bilat Extraocular muscles are intact. Sensation in the face is intact to light touch. Face is symmetric. Hearing to finger rub equal. Palate elevates symmetrically. Shoulder shrug is full. Tongue is midline.  Motor exam: Weakness legs right > left 3+/5 HF 1/5 ADF bilateral. Tone is normal. No abnormal movements were seen on exam.  Sensory exam: No sensory deficits identified   Deep tendon reflexes:  Hyper reflexic left LE, bilateral babinskis  Gait: deferred weakness    Objective Data:    Labs:  Lab Results   Component Value Date/Time    PROTHROMBTM 12.7 11/06/2019 03:30 PM    INR 0.93 11/06/2019 03:30 PM      Lab Results   Component Value Date/Time    WBC 7.4 12/23/2019 03:52 AM    WBC 6.1 07/20/2010 11:00 AM    RBC 4.41 12/23/2019 03:52 AM    RBC 4.38 07/20/2010 11:00 AM    HEMOGLOBIN 13.4 12/23/2019 03:52 AM    HEMATOCRIT 40.9 12/23/2019 03:52 AM    MCV 92.7 12/23/2019 03:52 AM    MCV 93 07/20/2010 11:00 AM    MCH 30.4 12/23/2019 03:52 AM    MCH 30.1 07/20/2010 11:00 AM    MCHC 32.8 (L) 12/23/2019 03:52 AM    MPV 11.0 12/23/2019 03:52 AM    NEUTSPOLYS 78.00 (H) 12/16/2019 05:56 AM    LYMPHOCYTES 14.80 (L) 12/16/2019 05:56 AM    MONOCYTES 4.80 12/16/2019 05:56 AM    EOSINOPHILS 1.50 12/16/2019 05:56 AM    BASOPHILS 0.40 12/16/2019 05:56 AM    ANISOCYTOSIS 1+ 07/08/2019 04:04 PM      Lab Results   Component Value Date/Time    SODIUM 139 12/23/2019 03:52 AM     POTASSIUM 3.4 (L) 12/23/2019 03:52 AM    CHLORIDE 106 12/23/2019 03:52 AM    CO2 25 12/23/2019 03:52 AM    GLUCOSE 89 12/23/2019 03:52 AM    BUN 17 12/23/2019 03:52 AM    CREATININE 0.75 12/23/2019 03:52 AM    CREATININE 0.75 (L) 07/20/2010 11:00 AM    BUNCREATRAT 19 07/20/2010 11:00 AM    GLOMRATE >59 07/20/2010 11:00 AM      Lab Results   Component Value Date/Time    CHOLSTRLTOT 150 11/08/2019 04:30 AM    LDL 70 11/08/2019 04:30 AM    HDL 50 11/08/2019 04:30 AM    TRIGLYCERIDE 149 11/08/2019 04:30 AM       Lab Results   Component Value Date/Time    ALKPHOSPHAT 54 12/16/2019 05:56 AM    ASTSGOT 14 12/16/2019 05:56 AM    ALTSGPT 30 12/16/2019 05:56 AM    TBILIRUBIN 0.8 12/16/2019 05:56 AM        Imaging/Testing:  Unable to get MRIs due to bowel and bladder stim.  CT head reviewed without obvious lesion.  Assessment and Plan:    Kristin Baledrrama is a 29 y.o. female with relevant history of optic neuritis presenting for whom neurology was consulted to address management.  2 years ago presented with ON.  Neurologist Dr Yousif.  On cellcept 1000 mg BID and pred taper currently on 10 mg daily.  Started cellcept 500 mg BID 3 to 4 months ago and increased dose to current wright 4 to 6 weeks ago.  Unclear if current sxs are ON recurrence or possibly opportunistic infection given exposure to DMT.  Pt also on neuro active meds ie gabapentin lyrica and VPA per notes.  Pt could have side effects. VPA level reasonable.  LP recommended with MS profile and infection profile.  Opening pressure recommended.  Given body habitus and bowel bladder stim rec LP under IR.  Solumedrol dosing or IVIG may be warranted.    Plan:    The evaluation of the patient, and recommended management, was discussed with the resident staff.     Jose Ramires MD  Board Certified Neurology, ABPN  Board Certified Vascular Neurology, ABPN  t) [phone]

## 2019-12-23 NOTE — PROGRESS NOTES
"Hospital Medicine Daily Progress Note    Date of Service  12/22/2019    Chief Complaint  29 y.o. female admitted 12/13/2019 with AMS    Hospital Course    per HPI  29 y.o. female who presented 12/13/2019 with altered level consciousness.  At this point in time, patient is mostly nonverbal, information obtained from her grandmother who was the one who found her.  She states whenever she left she was sleeping but just prior to this had been normal.  She states this morning she had some shortness of breath, she does have a history of asthma.  She did use breathing treatments and a home nurse helped with her breathing and it did seem to normalize.  Her grandmother left, was gone around 90 minutes, upon returning home she found her down, unconscious.  She did call 911.  She did attempt to wake up her granddaughter for quite some time and eventually she did wake up but seemed very groggy and confused.  She states she did just finish her antibiotics for her UTI/bronchitis, otherwise has been taking her usual home medications.  She has not been missing any medications nor taking extra of her medications.  I did discuss the case including labs and imaging with the ER physician.             Interval Problem Update  Patient is still complaining of mild nausea, having back spasms which is chronic, 7/10 in severity at times. Feels like she needs more time to feel better. Denies fevers/chills, denies dysuria or diarrhea.  12/17.   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 Newton, Neuro-Ophthalmology.  History of atrial fibrillation and cardiomyopathy? On Lasix, aspirin and ivadrabine. July 2019 echo: Normal left ventricular systolic function.  Left ventricular ejection fraction is visually estimated to be 65%.  Normal left ventricular wall " "thickness.  Normal left atrial size.  Trace mitral regurgitation.  Structurally normal aortic valve without significant stenosis or   regurgitation.  Estimated right ventricular systolic pressure  is 30 mmHg.  Trace tricuspid regurgitation.  Normal right ventricular size.  Also chronic pain and neuropathy  Patient fell per nursing and now mentions she has intractable R hip pain and can't walk. Ordered Hip XR and that turned out to be normal, no arthritis.  Later she mentioned she choked on a piece of meat and is short of breath, She however speaks full sentences, not hypoxic, not wheezing.   She is on several pain medications and anti-inflammatories; sees Pain Clinic and Dr. Cabral, Neuroophthalmology. She mentions she is on low dose steroids prescribed to her by Dr. Wilkins.  12/18. Patient \"fell to knees\" again.  Discussed with Dr. Sabillon in depth.  12/19. Unchanged nonspecific complaints of pain and weakness.  I called dr. Chowdary, who is following her for optic neuritis.  I then called Dr. Rincon, and Cande Jennings, Neurology  12/20. No issues or complaints currently. Grandmother at bedside with questions.  SW/Palliative asking about competency eval as patient desires to be DNR.  For now I tony josemanuel Speech for cognitive eval. Psych has been involved so may need formal neurocognitive testing for competency  12/21. No new issues or concerns when I saw her  Later nurse reports she is having \"vision problems\" similar to optic neuritis flare. Of note, she is on high dose prednisone.   12/22. She feels she has \"eye sypmtoms\" and paresthesias  Her grandmother worried about her eating that she is not getting enough because she needs solid foods. She is on a pureed diet.      Consultants/Specialty  Psychiatry    Code Status  Full code    Disposition  PT/OT reevaluated.  Because of fall risk they recommend rehab or SNF.  Grandmother wanted her home however she is 80 years old and she did not argue when patient " pointed that out.  12/18. Plan for rehab or SNF  12/20. Will need to finish high dose steroid trial and have formal neurocognitive eval for competency.    Review of Systems  Review of Systems   Constitutional: Positive for malaise/fatigue. Negative for chills and fever.   HENT: Negative for congestion, hearing loss, sore throat and tinnitus.    Eyes: Negative for blurred vision, double vision, photophobia and pain.   Respiratory: Negative for cough, hemoptysis, sputum production, shortness of breath and stridor.    Cardiovascular: Negative for chest pain, palpitations, orthopnea, claudication and PND.   Gastrointestinal: Positive for nausea. Negative for abdominal pain, blood in stool, constipation, diarrhea, heartburn, melena and vomiting.   Genitourinary: Negative for dysuria, frequency and urgency.   Musculoskeletal: Positive for back pain (spasms) and myalgias. Negative for neck pain.   Neurological: Negative for dizziness, tingling, tremors, sensory change, speech change, weakness and headaches.   Psychiatric/Behavioral: Negative for depression, memory loss and suicidal ideas. The patient is not nervous/anxious.    All other systems reviewed and are negative.       Physical Exam  Temp:  [35.8 °C (96.4 °F)-37.4 °C (99.4 °F)] 35.8 °C (96.4 °F)  Pulse:  [67-78] 78  Resp:  [18-20] 18  BP: (104-126)/(51-69) 112/57  SpO2:  [92 %-96 %] 92 %    Physical Exam  Vitals signs and nursing note reviewed.   Constitutional:       General: She is not in acute distress.     Appearance: Normal appearance. She is obese. She is not ill-appearing.   HENT:      Head: Normocephalic and atraumatic.      Right Ear: Tympanic membrane normal.      Left Ear: Tympanic membrane normal.      Nose: No congestion.      Mouth/Throat:      Mouth: Mucous membranes are dry.      Pharynx: Oropharynx is clear. No oropharyngeal exudate or posterior oropharyngeal erythema.   Eyes:      Extraocular Movements: Extraocular movements intact.       Conjunctiva/sclera: Conjunctivae normal.      Pupils: Pupils are equal, round, and reactive to light.      Comments: Monspecific vision complaints.   Neck:      Musculoskeletal: Normal range of motion and neck supple. No neck rigidity.   Cardiovascular:      Rate and Rhythm: Normal rate.      Pulses: Normal pulses.      Heart sounds: No murmur.   Pulmonary:      Effort: Pulmonary effort is normal. No respiratory distress.      Breath sounds: Normal breath sounds. No wheezing or rales.   Abdominal:      General: Abdomen is flat. Bowel sounds are normal. There is no distension.      Palpations: Abdomen is soft.      Tenderness: There is no tenderness. There is no guarding.   Musculoskeletal: Normal range of motion.         General: Tenderness (nonspecific myalgias) present. No swelling.      Comments: Nonspecific myalgias, arthralgias out of proportion to exam   Skin:     General: Skin is warm and dry.      Capillary Refill: Capillary refill takes less than 2 seconds.      Coloration: Skin is not pale.   Neurological:      General: No focal deficit present.      Mental Status: She is alert and oriented to person, place, and time. Mental status is at baseline.      Cranial Nerves: No cranial nerve deficit.      Comments: Odd insight at times   Psychiatric:      Comments: Anxious and needy behavior  Mentioned she has auditory hallucinations  12/20. Currently more stable moods.  12/21. Anxious again.  12/22. Better mood.         Fluids    Intake/Output Summary (Last 24 hours) at 12/22/2019 1745  Last data filed at 12/22/2019 1200  Gross per 24 hour   Intake 760 ml   Output 200 ml   Net 560 ml       Laboratory                        Imaging  DX-HIP-UNILATERAL-WITH PELVIS-1 VIEW RIGHT   Final Result      No acute osseous abnormality.      CT-HEAD W/O   Final Result         1.  No acute intracranial abnormality.   2.  Stable partial opacification of left mastoid air cells, consider effusion or component of mastoiditis as  "clinically appropriate      DX-CHEST-PORTABLE (1 VIEW)   Final Result      No evidence of acute cardiopulmonary process.           Assessment/Plan  * Altered level of consciousness- (present on admission)  Assessment & Plan  Highly suspect this is secondary to polypharmacy, patient is on a lot of sedating medications  She does have a history of accidental overdose in the past but denies this  No seizure activity noted, no evidence of rhabdomyolysis  Currently mentation has resolved, is AAOx4 past 2 days.  He had a long discussion in terms of decreasing her sedating medications.  12/17. No changes in her pain regimen and sedatives  Follow up to her Pain Clinic.  12/18. Discussed with Dr. Sabillon  Not very clear diagnoses but managed for functional transverse myelitis, possible optic neuritis, seen by Dr. Newton  Spine stimulator limits further evaluation for other causes of enceiphalopathy/weakness/falls such as MRIs.  Taper of gabapentin and transition to Trileptal  Modenalfil also started  Monitor her symptoms of weakness causing her to fall  12/19. May have a functional component but Dr. Chowdary feels there could be neurologic disease  He recommended formal neurology consult to r/o transverse myelitis or CNS lesions and see if it is acceptable to get a CT myelogram since MRI is contraindicated due to her gastric pacer/stimulator  Discussed with Neurology.   Has had CT myelogram in the past per their records and seen Dr. Fox. Has been nondiagnostic.  Currently no other possible myelopathy diagnosis.  Symptomatology also inconsistent as she has reflexes  She has been worked up for myopathies; on Dr. Fox's notes in the past, muscle biopsy was nondiagnostic.  In the past IV Solumedrol was given and per Dr. Fox, shpowed some benefit for a diagnosis of \"paralysis\" and \"lower motor neuron etiology\". According to patient she has been also referred to [facility] before and she may possibly have a " mitochondrial type mypopathy or neuromyopathy. This was 2 years ago however and recently she has been experiencing weakness and falls again.  Currently agree that no need for CT myelogram right now, will evaluate if high dose steroids will be of any benefit otherwise she will have to follow-up with Dr. Fox.  Ordered 3d course of Solu-medrol to see if there is any benefit, as Neurology recommended no harm to try what worked in the past.  12/20.  Discussed plan with grandmother and patient  Will do the steroid trial. Poor IV access so switch to prednisone 60x 1 followe dby pred 10 as per Dr. 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 Inpatient Neurology has no further comment and recommnd following Dr. Fox, neurology for further management  Meanwhile grandmother admits she cannot care for her 24/7 so she is awaiting SNF/rehab/ SW working on it.  12/22. Ordered supplements. Dietary and Speech consults    Advanced care planning/counseling discussion  Assessment & Plan  12/20. Patient has indicated to Palliative that she wants to be DNR  Currently she does not have a diagnosis to say that she is terminal  She also has been followed by Psychiatry and has had altered mentation in the past  I explained to Palliative and SW that a formal neuro cognitive eval needs to be done for competency as I can only assess capacity  SLP to do cognitive eval and will let me know if formal cognitive eval needed, and if so I will consult Psychology.  12/21. Speech for cognitive eval, and if they recommend formal testing for competency will have Psychology evaluate.,  By definition:   Competency is a global assessment and legal determination made by a  in court. Capacity is a functional assessment and a clinical determination about a specific decision that can be made by any clinician familiar with a patient’s case.  Hospitalists frequently encounter situations in which a patient’s capacity is called into question; in most cases, this is a determination a hospitalist can make independent of consultants.      Morbidly obese (HCC)- (present on admission)  Assessment & Plan  Body mass index is 46.26 kg/m².  Encourage healthy lifestyle and physical activity.    History of optic neuritis  Assessment & Plan  Per her report and she follows Dr. Wilkins.  12/22. Call Dr. Yoder tomorrow.    ASTHMA  Assessment & Plan  Controlled on Symbicort, montelukast  Continue RT protocol, duo nebs, Pep therapy if warranted, and incentive spirometry.       Mastoiditis  Assessment & Plan  CT head shows Stable partial opacification of left mastoid air cells, consider effusion or component of mastoiditis as clinically appropriate  Resume augmentin.    Immunocompromised (HCC)- (present on admission)  Assessment & Plan  On steroids and immunosuppressants for optic neuritis followed byu Dr. Newton, NeuroOphthalmology    Acquired hypothyroidism- (present on admission)  Assessment & Plan  Resume home dose of Synthroid  Last month her TSH was within normal limits    Type 2 diabetes mellitus with hyperglycemia, without long-term current use of insulin ([facility])- (present on admission)  Assessment & Plan  Continue Insulin-sliding scale, accu-checks and hypoglycemia protocol.  Continue with Consistent Carbohydrate diet   Hold home dose of trulicity     Depression- (present on admission)  Assessment & Plan  Resume home dose of Prozac    GERD (gastroesophageal reflux disease)- (present on admission)  Assessment & Plan  Continue with Pepcid    Borderline personality disorder in adult ([facility])- (present on admission)  Assessment & Plan  Continue with home dose of Prozac, Geodon and trazodone      Patient plan of care discussed at multidisplinary team rounds and with patient and R.N at beside.      VTE prophylaxis: Lovenox     Reviewed vitals, labs, imaging, staff  notes.  Discussed assessment and plan with Kristin Balderrama   Discussed with RN and social work  Discussed with Psychiatry  Discussed with Neurology  Discussed with Palliative.

## 2019-12-23 NOTE — CARE PLAN
Problem: Safety  Goal: Will remain free from injury  Outcome: PROGRESSING AS EXPECTED  Goal: Will remain free from falls  Outcome: PROGRESSING AS EXPECTED     Problem: Infection  Goal: Will remain free from infection  Outcome: PROGRESSING AS EXPECTED     Problem: Respiratory:  Goal: Respiratory status will improve  Outcome: PROGRESSING AS EXPECTED     Problem: Medication  Goal: Compliance with prescribed medication will improve  Outcome: PROGRESSING AS EXPECTED     Problem: Knowledge Deficit  Goal: Knowledge of the prescribed therapeutic regimen will improve  Outcome: PROGRESSING AS EXPECTED  Goal: Knowledge of disease process/condition, treatment plan, diagnostic tests, and medications will improve  Outcome: PROGRESSING AS EXPECTED     Problem: Discharge Barriers/Planning  Goal: Patient's continuum of care needs will be met  Outcome: PROGRESSING AS EXPECTED     Problem: Communication  Goal: The ability to communicate needs accurately and effectively will improve  Outcome: PROGRESSING AS EXPECTED     Problem: Venous Thromboembolism (VTW)/Deep Vein Thrombosis (DVT) Prevention:  Goal: Patient will participate in Venous Thrombosis (VTE)/Deep Vein Thrombosis (DVT)Prevention Measures  Outcome: PROGRESSING AS EXPECTED     Problem: Bowel/Gastric:  Goal: Normal bowel function is maintained or improved  Outcome: PROGRESSING SLOWER THAN EXPECTED  Note:   Pt has a few too many stools

## 2019-12-23 NOTE — PROGRESS NOTES
"Hospital Medicine Daily Progress Note    Date of Service  12/23/2019    Chief Complaint  29 y.o. female admitted 12/13/2019 with AMS    Hospital Course    per HPI  29 y.o. female who presented 12/13/2019 with altered level consciousness.  At this point in time, patient is mostly nonverbal, information obtained from her grandmother who was the one who found her.  She states whenever she left she was sleeping but just prior to this had been normal.  She states this morning she had some shortness of breath, she does have a history of asthma.  She did use breathing treatments and a home nurse helped with her breathing and it did seem to normalize.  Her grandmother left, was gone around 90 minutes, upon returning home she found her down, unconscious.  She did call 911.  She did attempt to wake up her granddaughter for quite some time and eventually she did wake up but seemed very groggy and confused.  She states she did just finish her antibiotics for her UTI/bronchitis, otherwise has been taking her usual home medications.  She has not been missing any medications nor taking extra of her medications.  I did discuss the case including labs and imaging with the ER physician.             Interval Problem Update  Patient is still complaining of mild nausea, having back spasms which is chronic, 7/10 in severity at times. Feels like she needs more time to feel better. Denies fevers/chills, denies dysuria or diarrhea.  12/17.   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 Newton, Neuro-Ophthalmology.  History of atrial fibrillation and cardiomyopathy? On Lasix, aspirin and ivadrabine. July 2019 echo: Normal left ventricular systolic function.  Left ventricular ejection fraction is visually estimated to be 65%.  Normal left ventricular wall " "thickness.  Normal left atrial size.  Trace mitral regurgitation.  Structurally normal aortic valve without significant stenosis or   regurgitation.  Estimated right ventricular systolic pressure  is 30 mmHg.  Trace tricuspid regurgitation.  Normal right ventricular size.  Also chronic pain and neuropathy  Patient fell per nursing and now mentions she has intractable R hip pain and can't walk. Ordered Hip XR and that turned out to be normal, no arthritis.  Later she mentioned she choked on a piece of meat and is short of breath, She however speaks full sentences, not hypoxic, not wheezing.   She is on several pain medications and anti-inflammatories; sees Pain Clinic and Dr. Cabral, Neuroophthalmology. She mentions she is on low dose steroids prescribed to her by Dr. Wilkins.  12/18. Patient \"fell to knees\" again.  Discussed with Dr. Sabillon in depth.  12/19. Unchanged nonspecific complaints of pain and weakness.  I called dr. Chowdary, who is following her for optic neuritis.  I then called Dr. Rincon, and Cande Jennings, Neurology  12/20. No issues or complaints currently. Grandmother at bedside with questions.  SW/Palliative asking about competency eval as patient desires to be DNR.  For now I tony josemanuel Speech for cognitive eval. Psych has been involved so may need formal neurocognitive testing for competency  12/21. No new issues or concerns when I saw her  Later nurse reports she is having \"vision problems\" similar to optic neuritis flare. Of note, she is on high dose prednisone.   12/22. She feels she has \"eye sypmtoms\" and paresthesias  Her grandmother worried about her eating that she is not getting enough because she needs solid foods. She is on a pureed diet.      Consultants/Specialty  Psychiatry    Code Status  Full code    Disposition  PT/OT reevaluated.  Because of fall risk they recommend rehab or SNF.  Grandmother wanted her home however she is 80 years old and she did not argue when patient " pointed that out.  12/18. Plan for rehab or SNF  12/20. Will need to finish high dose steroid trial and have formal neurocognitive eval for competency.    Review of Systems  Review of Systems   Constitutional: Positive for malaise/fatigue. Negative for chills and fever.   HENT: Negative for congestion, hearing loss, sore throat and tinnitus.    Eyes: Negative for blurred vision, double vision, photophobia and pain.   Respiratory: Negative for cough, hemoptysis, sputum production, shortness of breath and stridor.    Cardiovascular: Negative for chest pain, palpitations, orthopnea, claudication and PND.   Gastrointestinal: Positive for nausea. Negative for abdominal pain, blood in stool, constipation, diarrhea, heartburn, melena and vomiting.   Genitourinary: Negative for dysuria, frequency and urgency.   Musculoskeletal: Positive for back pain (spasms) and myalgias. Negative for neck pain.   Neurological: Negative for dizziness, tingling, tremors, sensory change, speech change, weakness and headaches.   Psychiatric/Behavioral: Negative for depression, memory loss and suicidal ideas. The patient is not nervous/anxious.    All other systems reviewed and are negative.       Physical Exam  Temp:  [35.8 °C (96.4 °F)-36.4 °C (97.6 °F)] 36.4 °C (97.6 °F)  Pulse:  [70-78] 78  Resp:  [18-20] 19  BP: (112-137)/(57-79) 128/72  SpO2:  [92 %-98 %] 96 %    Physical Exam  Vitals signs and nursing note reviewed.   Constitutional:       General: She is not in acute distress.     Appearance: Normal appearance. She is obese. She is not ill-appearing.   HENT:      Head: Normocephalic and atraumatic.      Right Ear: Tympanic membrane normal.      Left Ear: Tympanic membrane normal.      Nose: No congestion.      Mouth/Throat:      Mouth: Mucous membranes are dry.      Pharynx: Oropharynx is clear. No oropharyngeal exudate or posterior oropharyngeal erythema.   Eyes:      Extraocular Movements: Extraocular movements intact.       Conjunctiva/sclera: Conjunctivae normal.      Pupils: Pupils are equal, round, and reactive to light.      Comments: Monspecific vision complaints.   Neck:      Musculoskeletal: Normal range of motion and neck supple. No neck rigidity.   Cardiovascular:      Rate and Rhythm: Normal rate.      Pulses: Normal pulses.      Heart sounds: No murmur.   Pulmonary:      Effort: Pulmonary effort is normal. No respiratory distress.      Breath sounds: Normal breath sounds. No wheezing or rales.   Abdominal:      General: Abdomen is flat. Bowel sounds are normal. There is no distension.      Palpations: Abdomen is soft.      Tenderness: There is no tenderness. There is no guarding.   Musculoskeletal: Normal range of motion.         General: Tenderness (nonspecific myalgias) present. No swelling.      Comments: Nonspecific myalgias, arthralgias out of proportion to exam   Skin:     General: Skin is warm and dry.      Capillary Refill: Capillary refill takes less than 2 seconds.      Coloration: Skin is not pale.   Neurological:      General: No focal deficit present.      Mental Status: She is alert and oriented to person, place, and time. Mental status is at baseline.      Cranial Nerves: No cranial nerve deficit.      Comments: Odd insight at times   Psychiatric:      Comments: Anxious and needy behavior  Mentioned she has auditory hallucinations  12/20. Currently more stable moods.  12/21. Anxious again.  12/22. Better mood.         Fluids  No intake or output data in the 24 hours ending 12/23/19 1507    Laboratory  Recent Labs     12/23/19  0352   WBC 7.4   RBC 4.41   HEMOGLOBIN 13.4   HEMATOCRIT 40.9   MCV 92.7   MCH 30.4   MCHC 32.8*   RDW 42.9   PLATELETCT 145*   MPV 11.0     Recent Labs     12/23/19  0352   SODIUM 139   POTASSIUM 3.4*   CHLORIDE 106   CO2 25   GLUCOSE 89   BUN 17   CREATININE 0.75   CALCIUM 9.0                   Imaging  CT-HEAD W/O   Final Result      No acute intracranial abnormality.       DX-HIP-UNILATERAL-WITH PELVIS-1 VIEW RIGHT   Final Result      No acute osseous abnormality.      CT-HEAD W/O   Final Result         1.  No acute intracranial abnormality.   2.  Stable partial opacification of left mastoid air cells, consider effusion or component of mastoiditis as clinically appropriate      DX-CHEST-PORTABLE (1 VIEW)   Final Result      No evidence of acute cardiopulmonary process.      DX-LUMBAR PUNCTURE FOR DIAGNOSIS    (Results Pending)        Assessment/Plan  * Altered level of consciousness- (present on admission)  Assessment & Plan  Highly suspect this is secondary to polypharmacy, patient is on a lot of sedating medications  She does have a history of accidental overdose in the past but denies this  No seizure activity noted, no evidence of rhabdomyolysis  Currently mentation has resolved, is AAOx4 past 2 days.  He had a long discussion in terms of decreasing her sedating medications.  12/17. No changes in her pain regimen and sedatives  Follow up to her Pain Clinic.  12/18. Discussed with Dr. Sabillon  Not very clear diagnoses but managed for functional transverse myelitis, possible optic neuritis, seen by Dr. Newton  Spine stimulator limits further evaluation for other causes of enceiphalopathy/weakness/falls such as MRIs.  Taper of gabapentin and transition to Trileptal  Modenalfil also started  Monitor her symptoms of weakness causing her to fall  12/19. May have a functional component but Dr. Chowdary feels there could be neurologic disease  He recommended formal neurology consult to r/o transverse myelitis or CNS lesions and see if it is acceptable to get a CT myelogram since MRI is contraindicated due to her gastric pacer/stimulator  Discussed with Neurology.   Has had CT myelogram in the past per their records and seen Dr. Fox. Has been nondiagnostic.  Currently no other possible myelopathy diagnosis.  Symptomatology also inconsistent as she has reflexes  She has been  "worked up for myopathies; on Dr. Fox's notes in the past, muscle biopsy was nondiagnostic.  In the past IV Solumedrol was given and per Dr. Fox, shpowed some benefit for a diagnosis of \"paralysis\" and \"lower motor neuron etiology\". According to patient [facility] and she may possibly have a mitochondrial type mypopathy or neuromyopathy. This was 2 years ago however and recently she has been experiencing weakness and falls again.  Currently agree that no need for CT myelogram right now, will evaluate if high dose steroids will be of any benefit otherwise she will have to follow-up with Dr. Fox.  Ordered 3d course of Solu-medrol to see if there is any benefit, as Neurology recommended no harm to try what worked in the past.  12/20.  Discussed plan with grandmother and patient  Will do the steroid trial. Poor IV access so switch to prednisone 60x 1 followe dby pred 10 as per Dr. 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 Inpatient Neurology has no further comment and recommnd following Dr. Fox, neurology for further management  Meanwhile grandmother admits she cannot care for her 24/7 so she is awaiting SNF/rehab/ SW working on it.  12/22. Ordered supplements. Dietary and Speech consults  12/23. Consulted Dr. Ramires, neurology for second opinion.  He is concerned for opportunistic disease of the spine as she is on immunosuppressants  She has a history of back surgery, obesity and she feels she needs anesthesia (does not want bedside LP) therefore ordered head CT followed by IR guided LP. Ordered encephalitis panel.  Left message to Dr. Chowdary who isn't in the office today/vacation.    Advanced care planning/counseling discussion  Assessment & Plan  12/20. Patient has indicated to Palliative that she wants to be DNR  Currently she does not have a diagnosis to " say that she is terminal  She also has been followed by Psychiatry and has had altered mentation in the past  I explained to Palliative and SW that a formal neuro cognitive eval needs to be done for competency as I can only assess capacity  SLP to do cognitive eval and will let me know if formal cognitive eval needed, and if so I will consult Psychology.  12/21. Speech for cognitive eval, and if they recommend formal testing for competency will have Psychology evaluate.,  By definition:   Competency is a global assessment and legal determination made by a  in court. Capacity is a functional assessment and a clinical determination about a specific decision that can be made by any clinician familiar with a patient’s case. Hospitalists frequently encounter situations in which a patient’s capacity is called into question; in most cases, this is a determination a hospitalist can make independent of consultants.  12/23. Awaiting SLP for cognitive eval  Reconsult Psych to assess if there is any behavioral component to affect her competency  Psychology consulted; depends on SLP and Psychiatry  If cognitive eval normal and no behaviporal issue/depression or suicidality from the aove evaluating services then DNR/DNI POLST and documentation for competency can be signed.      Morbidly obese (HCC)- (present on admission)  Assessment & Plan  Body mass index is 46.26 kg/m².  Encourage healthy lifestyle and physical activity.    History of optic neuritis  Assessment & Plan  Per her report and she follows Dr. Wilkins.  12/22. Call Dr. Yoder tomorrow.    ASTHMA  Assessment & Plan  Controlled on Symbicort, montelukast  Continue RT protocol, duo nebs, Pep therapy if warranted, and incentive spirometry.       Mastoiditis  Assessment & Plan  CT head shows Stable partial opacification of left mastoid air cells, consider effusion or component of mastoiditis as clinically appropriate  Resume augmentin.    Immunocompromised (HCC)-  (present on admission)  Assessment & Plan  On steroids and immunosuppressants for optic neuritis followed byu Dr. Newton, NeuroOphthalmology    Acquired hypothyroidism- (present on admission)  Assessment & Plan  Resume home dose of Synthroid  Last month her TSH was within normal limits    Type 2 diabetes mellitus with hyperglycemia, without long-term current use of insulin (HCC)- (present on admission)  Assessment & Plan  Continue Insulin-sliding scale, accu-checks and hypoglycemia protocol.  Continue with Consistent Carbohydrate diet   Hold home dose of trulicity     Depression- (present on admission)  Assessment & Plan  Resume home dose of Prozac    GERD (gastroesophageal reflux disease)- (present on admission)  Assessment & Plan  Continue with Pepcid    Borderline personality disorder in adult (HCC)- (present on admission)  Assessment & Plan  Continue with home dose of Prozac, Geodon and trazodone      Patient plan of care discussed at multidisplinary team rounds and with patient and R.N at [facility].      VTE prophylaxis: Lovenox     Reviewed vitals, labs, imaging, staff notes.  Discussed assessment and plan with Kristin Balderrama   Discussed with RN and social work  Discussed with Psychiatry  Discussed with Neurology  Discussed with Palliative.  Discussed with Psychology

## 2019-12-23 NOTE — PROGRESS NOTES
Assumed care at 1900. AOx4, c/o headache, medicated per MAR. C/o intermittent blurry vision. Able to ambulate to the bathroom with 2 person assist and FWW. Patient is unsteady and needs to be redirected to walk steadily. Updated on POC, VSS, hourly rounding in place.

## 2019-12-23 NOTE — PSYCHIATRY
"PSYCHIATRIC FOLLOW UP:    Reason for admission: Multiple ground-level falls with loss of consciousness.  Altered mental status  Reason for consult:unusual request for DNR/DNI. may have behavioral component. Please assist on our evaluationg for Competency. Psychology also to be consulted (dr. Reeder)   Requesting Physician: Dmitriy Harper M.D.  Supervising Physician: Dr. Unique M.D.  Source of Information: patient report and medical record  Legal Hold Status: Not indicated    Subjective:  Patient is a 29 y.o. female being seen for psychiatric follow up.  On interview, patient reports that she would like to receive the designation DO NOT INTUBATE and DO NOT RESUSCITATE.  Patient reports that she is wanting to make this decision for some time.  She has had repeated conversations with her cardiologist who has told her that if her heart were to fail, CPR would not be very beneficial in her case.  She is able to demonstrate an appropriate understanding of what DO NOT INTUBATE and DO NOT RESUSCITATE means.  She understands the consequences of this could result in her death.  She also demonstrates understanding of the benefits of resuscitation and intubation noting that it could prolong her life.  However, she reports that the risk of a decreased quality of life is concerning to her.  She believes that the benefits of receiving the designation DO NOT INTUBATE and DO NOT RESUSCITATE outweigh the risks.  She denies any current depressive symptoms.  She denies any thoughts of wanting to be dead, suicidal ideations or self-harm ideations.  She is looking forward to attending rehab and has been adherent with her treatment here in the hospital.    Patient reports an adequate appetite.  She does report some possible side effects since starting the topiramate including transient weakness and sensory changes.  She denies any SI/HI/AVH.  She denies any depressive symptoms and reports her mood as \"happy.\"    Review of Systems " "  Constitutional: Negative for chills and fever.   HENT: Negative for hearing loss and sore throat.    Eyes: Positive for blurred vision. Negative for double vision.   Respiratory: Negative for cough and shortness of breath.    Cardiovascular: Negative for chest pain and palpitations.   Gastrointestinal: Positive for constipation and nausea. Negative for diarrhea and vomiting.   Genitourinary: Positive for frequency. Negative for dysuria.   Musculoskeletal: Negative for back pain and myalgias.   Skin: Negative for itching and rash.   Neurological: Positive for tingling, sensory change and weakness.   Psychiatric/Behavioral: Negative for depression and suicidal ideas.       Psychiatric Examination:   Vitals: /72   Pulse 78   Temp 36.4 °C (97.6 °F) (Temporal)   Resp 19   Ht 1.651 m (5' 5\")   Wt (!) 126.1 kg (278 lb)   SpO2 96%   BMI 46.26 kg/m²   Musculoskeletal: No abnormal movements noted  Appearance: well-developed, well-nourished and no apparent distress, cooperative, engaged, friendly, pleasant and good eye contact  Thoughts: No overt delusions noted and patient denies SI and HI, linear, coherent and organized  Speech: regular rate, rhythm, volume, tone, and syntax  Mood: \"Happy\"  Affect: euthymic and congruent with mood  SI/HI: Denies SI and HI  Alert/Oriented: alert and oriented  Memory: Able to recall 3/3 words after several minutes  Fund of Knowledge: adequate  Insight/Judgement into symptoms: good  Neurological Testing: MMSE performed and wnl    Medications (currently prescribed at [facility]):  Current Facility-Administered Medications   Medication Dose Route Frequency Provider Last Rate Last Dose   • predniSONE (DELTASONE) tablet 10 mg  10 mg Oral DAILY Dmitriy Harper M.D.   10 mg at 12/23/19 0543   • OXcarbazepine (TRILEPTAL) tablet 150 mg  150 mg Oral BID Alexandria Sigala M.D.   150 mg at 12/23/19 0544   • gabapentin (NEURONTIN) capsule 200 mg  200 mg Oral 4X/DAY Alexandria BECKMAN" RUFUS Sigala   200 mg at 12/23/19 0915   • traZODone (DESYREL) tablet 50 mg  50 mg Oral Q EVENING Alexandria Sigala M.D.   50 mg at 12/22/19 1659   • modafinil (PROVIGIL) tablet 100 mg  100 mg Oral QAM Alexandria Sigala M.D.   100 mg at 12/23/19 0543   • ibuprofen (MOTRIN) tablet 800 mg  800 mg Oral TID PRN Dmitriy Harper M.D.   800 mg at 12/22/19 2339   • pregabalin (LYRICA) capsule 300 mg  300 mg Oral BID ABBY YoonOEffie   300 mg at 12/23/19 0542   • Ivabradine HCl TABS 7.5 mg  7.5 mg Oral BID Beto Hendrix M.D.   7.5 mg at 12/23/19 0554   • meclizine (ANTIVERT) tablet 12.5 mg  12.5 mg Oral TID PRN Beto Hendrix M.D.   12.5 mg at 12/23/19 0915   • amoxicillin-clavulanate (AUGMENTIN) 875-125 MG per tablet 1 Tab  1 Tab Oral Q12HRS Earl Meehan M.D.   1 Tab at 12/23/19 0544   • albuterol inhaler 2 Puff  2 Puff Inhalation Q4HRS PRN ABBY YoonOEffie   2 Puff at 12/22/19 0448   • albuterol (PROVENTIL) 2.5mg/0.5ml nebulizer solution 2.5 mg  2.5 mg Nebulization Q4H PRN (RT) ABBY YoonOEffie   2.5 mg at 12/18/19 1623   • enoxaparin (LOVENOX) inj 40 mg  40 mg Subcutaneous DAILY Jairo Mares A.P.R.N.   40 mg at 12/23/19 0544   • aspirin EC (ECOTRIN) tablet 81 mg  81 mg Oral DAILY ABBY YoonO.   81 mg at 12/23/19 0545   • budesonide-formoterol (SYMBICORT) 160-4.5 MCG/ACT inhaler 2 Puff  2 Puff Inhalation BID ABBY YoonO.   2 Puff at 12/23/19 0545   • busPIRone (BUSPAR) tablet 10 mg  10 mg Oral BID ABBY YoonO.   10 mg at 12/23/19 0543   • FLUoxetine (PROZAC) capsule 40 mg  40 mg Oral DAILY ABBY YoonO.   40 mg at 12/23/19 0543   • furosemide (LASIX) tablet 80 mg  80 mg Oral DAILY ABBY YoonO.   80 mg at 12/23/19 0545   • levothyroxine (SYNTHROID) tablet 75 mcg  75 mcg Oral AM ES ABBY YoonO.   75 mcg at 12/23/19 0543   • mycophenolate (CELLCEPT) capsule 1,000 mg  1,000 mg Oral BID ABBY YoonO.   1,000  mg at 12/23/19 0552   • potassium chloride SA (Kdur) tablet 20 mEq  20 mEq Oral BID ABBY YoonOEffie   20 mEq at 12/23/19 0543   • famotidine (PEPCID) tablet 20 mg  20 mg Oral QAM ABBY YoonO.   20 mg at 12/23/19 0544   • sodium bicarbonate tablet 650 mg  650 mg Oral BID ABBY YoonOEffie   650 mg at 12/23/19 0545   • tiotropium (SPIRIVA) 18 MCG inhalation capsule 1 Cap  1 Cap Inhalation DAILY ANTONI Yoon.O.   1 Cap at 12/23/19 0544   • ziprasidone (GEODON) capsule 80 mg  80 mg Oral BID ABBY YoonOEffie   80 mg at 12/23/19 0552   • senna-docusate (PERICOLACE or SENOKOT S) 8.6-50 MG per tablet 2 Tab  2 Tab Oral BID ABBY YoonO.   2 Tab at 12/23/19 0542    And   • polyethylene glycol/lytes (MIRALAX) PACKET 1 Packet  1 Packet Oral QDAY PRN ANTONI Yoon.O.   1 Packet at 12/14/19 1714    And   • magnesium hydroxide (MILK OF MAGNESIA) suspension 30 mL  30 mL Oral QDAY PRN John Albrecht D.O.        And   • bisacodyl (DULCOLAX) suppository 10 mg  10 mg Rectal QDAY PRN ABBY YoonO.       • acetaminophen (TYLENOL) tablet 650 mg  650 mg Oral Q6HRS PRN ABBY YoonO.   650 mg at 12/23/19 0552   • enalaprilat (VASOTEC) injection 1.25 mg  1.25 mg Intravenous Q6HRS PRN ABBY YoonO.       • ondansetron (ZOFRAN ODT) dispertab 4 mg  4 mg Oral Q4HRS PRN ABBY YoonO.   4 mg at 12/23/19 0735   • insulin regular (HUMULIN R) injection 1-6 Units  1-6 Units Subcutaneous 4X/DAY ACHS John Albrecht D.O.   Stopped at 12/21/19 0700    And   • glucose 4 g chewable tablet 16 g  16 g Oral Q15 MIN PRN John Albrecht D.O.        And   • DEXTROSE 10% BOLUS 250 mL  250 mL Intravenous Q15 MIN PRN John Albrecht D.O.       • acetaminophen/caffeine/butalbital 325-40-50 mg (FIORICET) -40 MG per tablet 1 Tab  1 Tab Oral Q6HRS PRN John Albrecht D.O.   1 Tab at 12/21/19 2121   • diphenhydrAMINE (BENADRYL) tablet/capsule 25 mg  25 mg Oral HS PRN John Albrecht,  D.O.   25 mg at 12/20/19 2029   • Respiratory Care per Protocol   Nebulization Continuous RT John Albrecht D.O.            Labs:  Recent Labs     12/23/19  0352   WBC 7.4   RBC 4.41   HEMOGLOBIN 13.4   HEMATOCRIT 40.9   MCV 92.7   MCH 30.4   MCHC 32.8*   RDW 42.9   PLATELETCT 145*   MPV 11.0     Recent Labs     12/23/19  0352   SODIUM 139   POTASSIUM 3.4*   CHLORIDE 106   CO2 25   GLUCOSE 89   BUN 17   CREATININE 0.75   CALCIUM 9.0                        Recent Results (from the past 72 hour(s))   ACCU-CHEK GLUCOSE    Collection Time: 12/20/19 12:16 PM   Result Value Ref Range    Glucose - Accu-Ck 154 (H) 65 - 99 mg/dL   ACCU-CHEK GLUCOSE    Collection Time: 12/20/19  4:41 PM   Result Value Ref Range    Glucose - Accu-Ck 158 (H) 65 - 99 mg/dL   ACCU-CHEK GLUCOSE    Collection Time: 12/20/19  8:18 PM   Result Value Ref Range    Glucose - Accu-Ck 155 (H) 65 - 99 mg/dL   ACCU-CHEK GLUCOSE    Collection Time: 12/21/19  8:19 AM   Result Value Ref Range    Glucose - Accu-Ck 127 (H) 65 - 99 mg/dL   ACCU-CHEK GLUCOSE    Collection Time: 12/21/19 12:09 PM   Result Value Ref Range    Glucose - Accu-Ck 108 (H) 65 - 99 mg/dL   ACCU-CHEK GLUCOSE    Collection Time: 12/21/19  5:11 PM   Result Value Ref Range    Glucose - Accu-Ck 120 (H) 65 - 99 mg/dL   ACCU-CHEK GLUCOSE    Collection Time: 12/21/19  8:29 PM   Result Value Ref Range    Glucose - Accu-Ck 84 65 - 99 mg/dL   ACCU-CHEK GLUCOSE    Collection Time: 12/22/19  8:22 AM   Result Value Ref Range    Glucose - Accu-Ck 103 (H) 65 - 99 mg/dL   ACCU-CHEK GLUCOSE    Collection Time: 12/22/19 11:45 AM   Result Value Ref Range    Glucose - Accu-Ck 124 (H) 65 - 99 mg/dL   ACCU-CHEK GLUCOSE    Collection Time: 12/22/19  4:45 PM   Result Value Ref Range    Glucose - Accu-Ck 89 65 - 99 mg/dL   ACCU-CHEK GLUCOSE    Collection Time: 12/22/19  8:55 PM   Result Value Ref Range    Glucose - Accu-Ck 83 65 - 99 mg/dL   CBC WITHOUT DIFFERENTIAL    Collection Time: 12/23/19  3:52 AM   Result  Value Ref Range    WBC 7.4 4.8 - 10.8 K/uL    RBC 4.41 4.20 - 5.40 M/uL    Hemoglobin 13.4 12.0 - 16.0 g/dL    Hematocrit 40.9 37.0 - 47.0 %    MCV 92.7 81.4 - 97.8 fL    MCH 30.4 27.0 - 33.0 pg    MCHC 32.8 (L) 33.6 - 35.0 g/dL    RDW 42.9 35.9 - 50.0 fL    Platelet Count 145 (L) 164 - 446 K/uL    MPV 11.0 9.0 - 12.9 fL   Basic Metabolic Panel    Collection Time: 12/23/19  3:52 AM   Result Value Ref Range    Sodium 139 135 - 145 mmol/L    Potassium 3.4 (L) 3.6 - 5.5 mmol/L    Chloride 106 96 - 112 mmol/L    Co2 25 20 - 33 mmol/L    Glucose 89 65 - 99 mg/dL    Bun 17 8 - 22 mg/dL    Creatinine 0.75 0.50 - 1.40 mg/dL    Calcium 9.0 8.5 - 10.5 mg/dL    Anion Gap 8.0 0.0 - 11.9   ESTIMATED GFR    Collection Time: 12/23/19  3:52 AM   Result Value Ref Range    GFR If African American >60 >60 mL/min/1.73 m 2    GFR If Non African American >60 >60 mL/min/1.73 m 2   ACCU-CHEK GLUCOSE    Collection Time: 12/23/19  9:09 AM   Result Value Ref Range    Glucose - Accu-Ck 112 (H) 65 - 99 mg/dL          Assessment:  Patient is a 29-year-old female with a longstanding psychiatric history and many comorbid medical problems who is requesting the designation of DO NOT RESUSCITATE and DO NOT INTUBATE.  Patient is able to demonstrate an appropriate understanding of her current health condition.  She is able to explain the reasoning behind her current hospitalization as well as the hospital course and future treatment plans.  She demonstrates an appropriate understanding of the risks, benefits and alternatives of receiving the designation DNI/DNR.  She denies any suicidal ideations or depressive symptoms.  She appears euthymic and her affect and is future oriented.  She exhibits good insight and judgment and displays no impairments on mental status exam.    Capacity is an assessment of a specific ability and the patient's ability to make a decision regarding a specific issue. A determination of incapacity does not have a specific time  frame associated with it.     Pt is capacitated to make the decision to institute a DNR/DNI for herself. In order to hold an  incapacitated pt on a medical hold, there must ALSO be an imminent  risk of death and/or disability to be determined by the treatment team.The final decision to hold or not hold is up to the treatment team.     If there are any questions, please call Risk Management at Pending sale to [facility] and/or consult the [employer] literature.     Diagnosis:  -Schizophrenia (by hx)  -Borderline PD  -Unspecified mood disorder  -EVELIA  -Rule out functional neurological disorder  -Rule out Somatic symptom disorder       Medical:  -Type 2 diabetes  -Obstructive sleep apnea  -GERD  -Chronic pain syndrome  -Possible transverse myelitis    Plan:  1. Legal Hold: Not indicated  2. Continue BuSpar 10 mg twice daily for anxiety, continue Prozac 40 mg daily for mood, continue Geodon 80 mg p.o. twice daily for psychosis, continue trazodone 50 mg nightly for sleep, continue Trileptal 150 mg twice daily for mood stabilization, continue modafinil 100 mg every morning for energy. No medication changes to be made until neurology workup including CT and LP are complete.   3. PRN medications as ordered  4. Disposition to be determined by primary team  5. No collateral obtained at this time      Thank you for this consult.  We will follow.

## 2019-12-23 NOTE — PSYCHIATRY
PSYCHOLOGY NOTE: Consult received for psychotherapy. Psychiatry also seeing patient. Will defer to psychiatry on whether or not patient is in need of psychotherapy services in the acute care setting. Thank you.

## 2019-12-23 NOTE — PROGRESS NOTES
Pt had no changes to her neurological symptoms today.  Strength waxes and wanes, ataxia continues, vision changes continue.

## 2019-12-24 ENCOUNTER — APPOINTMENT (OUTPATIENT)
Dept: RADIOLOGY | Facility: MEDICAL CENTER | Age: 29
DRG: 074 | End: 2019-12-24
Attending: INTERNAL MEDICINE
Payer: MEDICARE

## 2019-12-24 LAB
ANION GAP SERPL CALC-SCNC: 12 MMOL/L (ref 0–11.9)
BUN SERPL-MCNC: 17 MG/DL (ref 8–22)
BURR CELLS/RBC NFR CSF MANUAL: <1 %
CALCIUM SERPL-MCNC: 9 MG/DL (ref 8.5–10.5)
CHLORIDE SERPL-SCNC: 107 MMOL/L (ref 96–112)
CLARITY CSF: CLEAR
CO2 SERPL-SCNC: 20 MMOL/L (ref 20–33)
COLOR CSF: COLORLESS
CREAT SERPL-MCNC: 0.87 MG/DL (ref 0.5–1.4)
ERYTHROCYTE [DISTWIDTH] IN BLOOD BY AUTOMATED COUNT: 43.2 FL (ref 35.9–50)
GLUCOSE BLD-MCNC: 112 MG/DL (ref 65–99)
GLUCOSE BLD-MCNC: 113 MG/DL (ref 65–99)
GLUCOSE BLD-MCNC: 86 MG/DL (ref 65–99)
GLUCOSE CSF-MCNC: 75 MG/DL (ref 40–80)
GLUCOSE SERPL-MCNC: 101 MG/DL (ref 65–99)
GRAM STN SPEC: NORMAL
HCT VFR BLD AUTO: 42.8 % (ref 37–47)
HGB BLD-MCNC: 13.7 G/DL (ref 12–16)
INR PPP: 0.93 (ref 0.87–1.13)
LYMPHOCYTES NFR CSF: 73 %
MCH RBC QN AUTO: 29.8 PG (ref 27–33)
MCHC RBC AUTO-ENTMCNC: 32 G/DL (ref 33.6–35)
MCV RBC AUTO: 93.2 FL (ref 81.4–97.8)
MONONUC CELLS NFR CSF: 27 %
PLATELET # BLD AUTO: 150 K/UL (ref 164–446)
PMV BLD AUTO: 11.3 FL (ref 9–12.9)
POTASSIUM SERPL-SCNC: 3.7 MMOL/L (ref 3.6–5.5)
PROT CSF-MCNC: 24 MG/DL (ref 15–45)
PROTHROMBIN TIME: 12.6 SEC (ref 12–14.6)
RBC # BLD AUTO: 4.59 M/UL (ref 4.2–5.4)
RBC # CSF: 1 CELLS/UL
SIGNIFICANT IND 70042: NORMAL
SITE SITE: NORMAL
SODIUM SERPL-SCNC: 139 MMOL/L (ref 135–145)
SOURCE SOURCE: NORMAL
SPECIMEN VOL CSF: 14 ML
TUBE # CSF: 3
TUBE # CSF: 4
WBC # BLD AUTO: 7.4 K/UL (ref 4.8–10.8)
WBC # CSF: 1 CELLS/UL (ref 0–10)

## 2019-12-24 PROCEDURE — 87205 SMEAR GRAM STAIN: CPT

## 2019-12-24 PROCEDURE — 62270 DX LMBR SPI PNXR: CPT

## 2019-12-24 PROCEDURE — 700111 HCHG RX REV CODE 636 W/ 250 OVERRIDE (IP): Performed by: PSYCHIATRY & NEUROLOGY

## 2019-12-24 PROCEDURE — 85610 PROTHROMBIN TIME: CPT

## 2019-12-24 PROCEDURE — 99232 SBSQ HOSP IP/OBS MODERATE 35: CPT | Performed by: STUDENT IN AN ORGANIZED HEALTH CARE EDUCATION/TRAINING PROGRAM

## 2019-12-24 PROCEDURE — 009U3ZX DRAINAGE OF SPINAL CANAL, PERCUTANEOUS APPROACH, DIAGNOSTIC: ICD-10-PCS | Performed by: INTERNAL MEDICINE

## 2019-12-24 PROCEDURE — A9270 NON-COVERED ITEM OR SERVICE: HCPCS | Performed by: HOSPITALIST

## 2019-12-24 PROCEDURE — 80048 BASIC METABOLIC PNL TOTAL CA: CPT

## 2019-12-24 PROCEDURE — 89051 BODY FLUID CELL COUNT: CPT

## 2019-12-24 PROCEDURE — 99232 SBSQ HOSP IP/OBS MODERATE 35: CPT | Performed by: PSYCHIATRY & NEUROLOGY

## 2019-12-24 PROCEDURE — 87070 CULTURE OTHR SPECIMN AEROBIC: CPT

## 2019-12-24 PROCEDURE — 700102 HCHG RX REV CODE 250 W/ 637 OVERRIDE(OP): Performed by: PSYCHIATRY & NEUROLOGY

## 2019-12-24 PROCEDURE — 82962 GLUCOSE BLOOD TEST: CPT | Mod: 91

## 2019-12-24 PROCEDURE — 85027 COMPLETE CBC AUTOMATED: CPT

## 2019-12-24 PROCEDURE — 700111 HCHG RX REV CODE 636 W/ 250 OVERRIDE (IP): Performed by: INTERNAL MEDICINE

## 2019-12-24 PROCEDURE — 770006 HCHG ROOM/CARE - MED/SURG/GYN SEMI*

## 2019-12-24 PROCEDURE — 700102 HCHG RX REV CODE 250 W/ 637 OVERRIDE(OP): Performed by: HOSPITALIST

## 2019-12-24 PROCEDURE — 83916 OLIGOCLONAL BANDS: CPT

## 2019-12-24 PROCEDURE — A9270 NON-COVERED ITEM OR SERVICE: HCPCS | Performed by: INTERNAL MEDICINE

## 2019-12-24 PROCEDURE — B01B1ZZ FLUOROSCOPY OF SPINAL CORD USING LOW OSMOLAR CONTRAST: ICD-10-PCS | Performed by: INTERNAL MEDICINE

## 2019-12-24 PROCEDURE — 87483 CNS DNA AMP PROBE TYPE 12-25: CPT

## 2019-12-24 PROCEDURE — 84157 ASSAY OF PROTEIN OTHER: CPT

## 2019-12-24 PROCEDURE — A9270 NON-COVERED ITEM OR SERVICE: HCPCS | Performed by: PSYCHIATRY & NEUROLOGY

## 2019-12-24 PROCEDURE — 700102 HCHG RX REV CODE 250 W/ 637 OVERRIDE(OP): Performed by: INTERNAL MEDICINE

## 2019-12-24 PROCEDURE — 82945 GLUCOSE OTHER FLUID: CPT

## 2019-12-24 PROCEDURE — 36415 COLL VENOUS BLD VENIPUNCTURE: CPT

## 2019-12-24 RX ORDER — LORAZEPAM 2 MG/ML
1 INJECTION INTRAMUSCULAR PRN
Status: DISCONTINUED | OUTPATIENT
Start: 2019-12-24 | End: 2019-12-31 | Stop reason: HOSPADM

## 2019-12-24 RX ADMIN — SODIUM BICARBONATE 650 MG: 650 TABLET ORAL at 04:09

## 2019-12-24 RX ADMIN — GABAPENTIN 200 MG: 100 CAPSULE ORAL at 20:43

## 2019-12-24 RX ADMIN — BUSPIRONE HYDROCHLORIDE 10 MG: 10 TABLET ORAL at 04:09

## 2019-12-24 RX ADMIN — ZIPRASIDONE HYDROCHLORIDE 80 MG: 80 CAPSULE ORAL at 04:09

## 2019-12-24 RX ADMIN — MYCOPHENOLATE MOFETIL 1000 MG: 250 CAPSULE ORAL at 17:29

## 2019-12-24 RX ADMIN — FLUOXETINE HYDROCHLORIDE 40 MG: 20 CAPSULE ORAL at 04:09

## 2019-12-24 RX ADMIN — FUROSEMIDE 80 MG: 40 TABLET ORAL at 04:09

## 2019-12-24 RX ADMIN — LORAZEPAM 1 MG: 2 INJECTION INTRAMUSCULAR; INTRAVENOUS at 10:55

## 2019-12-24 RX ADMIN — TIOTROPIUM BROMIDE 1 CAPSULE: 18 CAPSULE ORAL; RESPIRATORY (INHALATION) at 06:33

## 2019-12-24 RX ADMIN — ZIPRASIDONE HYDROCHLORIDE 80 MG: 80 CAPSULE ORAL at 17:32

## 2019-12-24 RX ADMIN — PREGABALIN 300 MG: 100 CAPSULE ORAL at 17:30

## 2019-12-24 RX ADMIN — BUSPIRONE HYDROCHLORIDE 10 MG: 10 TABLET ORAL at 17:30

## 2019-12-24 RX ADMIN — SODIUM BICARBONATE 650 MG: 650 TABLET ORAL at 17:30

## 2019-12-24 RX ADMIN — ASPIRIN 81 MG: 81 TABLET, COATED ORAL at 04:09

## 2019-12-24 RX ADMIN — PREGABALIN 300 MG: 100 CAPSULE ORAL at 04:09

## 2019-12-24 RX ADMIN — ACETAMINOPHEN 650 MG: 325 TABLET, FILM COATED ORAL at 04:09

## 2019-12-24 RX ADMIN — GABAPENTIN 200 MG: 100 CAPSULE ORAL at 14:01

## 2019-12-24 RX ADMIN — LEVOTHYROXINE SODIUM 75 MCG: 75 TABLET ORAL at 04:09

## 2019-12-24 RX ADMIN — ACETAMINOPHEN 650 MG: 325 TABLET, FILM COATED ORAL at 15:41

## 2019-12-24 RX ADMIN — PREDNISONE 10 MG: 10 TABLET ORAL at 04:09

## 2019-12-24 RX ADMIN — OXCARBAZEPINE 150 MG: 150 TABLET, FILM COATED ORAL at 17:37

## 2019-12-24 RX ADMIN — FAMOTIDINE 20 MG: 20 TABLET ORAL at 04:10

## 2019-12-24 RX ADMIN — BUDESONIDE AND FORMOTEROL FUMARATE DIHYDRATE 2 PUFF: 160; 4.5 AEROSOL RESPIRATORY (INHALATION) at 04:10

## 2019-12-24 RX ADMIN — POTASSIUM CHLORIDE 20 MEQ: 20 TABLET, EXTENDED RELEASE ORAL at 04:10

## 2019-12-24 RX ADMIN — IBUPROFEN 800 MG: 800 TABLET, FILM COATED ORAL at 07:58

## 2019-12-24 RX ADMIN — BUDESONIDE AND FORMOTEROL FUMARATE DIHYDRATE 2 PUFF: 160; 4.5 AEROSOL RESPIRATORY (INHALATION) at 17:29

## 2019-12-24 RX ADMIN — TRAZODONE HYDROCHLORIDE 50 MG: 100 TABLET ORAL at 17:32

## 2019-12-24 RX ADMIN — SENNOSIDES AND DOCUSATE SODIUM 2 TABLET: 8.6; 5 TABLET ORAL at 04:10

## 2019-12-24 RX ADMIN — OXCARBAZEPINE 150 MG: 150 TABLET, FILM COATED ORAL at 04:10

## 2019-12-24 RX ADMIN — MODAFINIL 100 MG: 100 TABLET ORAL at 04:09

## 2019-12-24 RX ADMIN — MECLIZINE HYDROCHLORIDE 12.5 MG: 25 TABLET ORAL at 07:58

## 2019-12-24 RX ADMIN — GABAPENTIN 200 MG: 100 CAPSULE ORAL at 07:57

## 2019-12-24 RX ADMIN — MYCOPHENOLATE MOFETIL 1000 MG: 250 CAPSULE ORAL at 04:19

## 2019-12-24 RX ADMIN — GABAPENTIN 200 MG: 100 CAPSULE ORAL at 17:29

## 2019-12-24 RX ADMIN — POTASSIUM CHLORIDE 20 MEQ: 20 TABLET, EXTENDED RELEASE ORAL at 17:30

## 2019-12-24 NOTE — PROGRESS NOTES
Neurology Progress Note  Neurohospitalist Service, [facility]    Referring Physician: Francois Santamaria D.O.    Chief Complaint   Patient presents with   • Syncope     found down by mother, states that she last saw her 30min prior.    • Dizziness       HPI: Refer to initial documented Neurology H&P, as detailed in the patient's chart.    Interval History 12/24/19: Worsened blurred vision    Review of systems: In addition to what is detailed in the HPI and/or updated in the interval history, all other systems reviewed and are negative.    Past Medical History:    has a past medical history of Abdominal pain, Anginal syndrome, Apnea, sleep, Arrhythmia, Arthritis, ASTHMA, Atrial fibrillation (HCC), Back pain, Borderline personality disorder (HCC), Breath shortness, Bronchitis, Cardiac arrhythmia, Chickenpox, Chronic UTI (9/18/2010), Cough, Daytime sleepiness, Depression, Diabetes (HCC), Diarrhea, Disorder of thyroid, Fall, Fatigue, Frequent headaches, Gasping for breath, Gynecological disorder, Headache(784.0), Heart burn, History of falling, Hypertension, Indigestion, Migraine, 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HCC), 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HCC), Nausea, Obesity, Pain (08-15-12), Painful joint, PCOS (polycystic ovarian syndrome), Pneumonia (2012), Psychosis (HCC), Renal disorder, Ringing in ears, Scoliosis, Shortness of breath, Sinus tachycardia (10/31/2013), Sleep apnea, Snoring, Tonsillitis, Tuberculosis, Urinary bladder disorder, Urinary incontinence, Weakness, and Wears glasses.    FHx:  family history includes Genitourinary () Problems in her sister; Heart Disease in her maternal grandmother and mother; Hypertension in her maternal grandmother, maternal uncle, and mother; No Known Problems in her sister; Other in her mother and sister; Sleep Apnea in her mother.    SHx:   reports that she has never smoked. She has never used smokeless tobacco. She reports current drug use. Frequency: 7.00  times per week. Drugs: Marijuana and Oral. She reports that she does not drink alcohol.    Medications:    Current Facility-Administered Medications:   •  LORazepam (ATIVAN) injection 1 mg, 1 mg, Intramuscular, PRN, Jose Ramires M.D., 1 mg at 12/24/19 1055  •  predniSONE (DELTASONE) tablet 10 mg, 10 mg, Oral, DAILY, Dmitriy Harper M.D., 10 mg at 12/24/19 0409  •  OXcarbazepine (TRILEPTAL) tablet 150 mg, 150 mg, Oral, BID, Alexandria Sigala M.D., 150 mg at 12/24/19 0410  •  gabapentin (NEURONTIN) capsule 200 mg, 200 mg, Oral, 4X/DAY, Alexandria Sigala M.D., 200 mg at 12/24/19 0757  •  traZODone (DESYREL) tablet 50 mg, 50 mg, Oral, Q EVENING, Alexandria Sigala M.D., 50 mg at 12/23/19 1729  •  modafinil (PROVIGIL) tablet 100 mg, 100 mg, Oral, QAM, Alexandria Sigala M.D., 100 mg at 12/24/19 0409  •  ibuprofen (MOTRIN) tablet 800 mg, 800 mg, Oral, TID PRN, Dmitriy Harper M.D., 800 mg at 12/24/19 0758  •  pregabalin (LYRICA) capsule 300 mg, 300 mg, Oral, BID, ANTONI Yoon.NICK, 300 mg at 12/24/19 0409  •  Ivabradine HCl TABS 7.5 mg, 7.5 mg, Oral, BID, Beto Hendrix M.D., 7.5 mg at 12/24/19 0410  •  meclizine (ANTIVERT) tablet 12.5 mg, 12.5 mg, Oral, TID PRN, Beto Hendrix M.D., 12.5 mg at 12/24/19 0758  •  albuterol inhaler 2 Puff, 2 Puff, Inhalation, Q4HRS PRN, John Albrecht D.O., 2 Puff at 12/22/19 0448  •  albuterol (PROVENTIL) 2.5mg/0.5ml nebulizer solution 2.5 mg, 2.5 mg, Nebulization, Q4H PRN (RT), John Albrecth D.O., 2.5 mg at 12/18/19 1623  •  enoxaparin (LOVENOX) inj 40 mg, 40 mg, Subcutaneous, DAILY, VANIA Sanon, Stopped at 12/24/19 0600  •  aspirin EC (ECOTRIN) tablet 81 mg, 81 mg, Oral, DAILY, John Albrecht D.O., 81 mg at 12/24/19 0409  •  budesonide-formoterol (SYMBICORT) 160-4.5 MCG/ACT inhaler 2 Puff, 2 Puff, Inhalation, BID, John Albrecht D.O., 2 Puff at 12/24/19 0410  •  busPIRone (BUSPAR) tablet 10 mg, 10 mg, Oral, BID,  John Albrecht D.O., 10 mg at 12/24/19 0409  •  FLUoxetine (PROZAC) capsule 40 mg, 40 mg, Oral, DAILY, ANTONI Yoon.O., 40 mg at 12/24/19 0409  •  furosemide (LASIX) tablet 80 mg, 80 mg, Oral, DAILY, ANTONI Yoon.O., 80 mg at 12/24/19 0409  •  levothyroxine (SYNTHROID) tablet 75 mcg, 75 mcg, Oral, AM ES, John Albrecht D.O., 75 mcg at 12/24/19 0409  •  mycophenolate (CELLCEPT) capsule 1,000 mg, 1,000 mg, Oral, BID, ANTONI Yoon.O., 1,000 mg at 12/24/19 0419  •  potassium chloride SA (Kdur) tablet 20 mEq, 20 mEq, Oral, BID, ANTONI Yoon.O., 20 mEq at 12/24/19 0410  •  famotidine (PEPCID) tablet 20 mg, 20 mg, Oral, QAM, Jhon Albrecht D.O., 20 mg at 12/24/19 0410  •  sodium bicarbonate tablet 650 mg, 650 mg, Oral, BID, ANTONI Yoon.O., 650 mg at 12/24/19 0409  •  tiotropium (SPIRIVA) 18 MCG inhalation capsule 1 Cap, 1 Cap, Inhalation, DAILY, ANTONI Yoon.O., 1 Cap at 12/24/19 0633  •  ziprasidone (GEODON) capsule 80 mg, 80 mg, Oral, BID, John Albrecht D.O., 80 mg at 12/24/19 0409  •  senna-docusate (PERICOLACE or SENOKOT S) 8.6-50 MG per tablet 2 Tab, 2 Tab, Oral, BID, 2 Tab at 12/24/19 0410 **AND** polyethylene glycol/lytes (MIRALAX) PACKET 1 Packet, 1 Packet, Oral, QDAY PRN, 1 Packet at 12/14/19 1714 **AND** magnesium hydroxide (MILK OF MAGNESIA) suspension 30 mL, 30 mL, Oral, QDAY PRN **AND** bisacodyl (DULCOLAX) suppository 10 mg, 10 mg, Rectal, QDAY PRN, John Albrecht D.O.  •  acetaminophen (TYLENOL) tablet 650 mg, 650 mg, Oral, Q6HRS PRN, John Albrecht D.O., 650 mg at 12/24/19 0409  •  enalaprilat (VASOTEC) injection 1.25 mg, 1.25 mg, Intravenous, Q6HRS PRN, John Albrecht D.O.  •  ondansetron (ZOFRAN ODT) dispertab 4 mg, 4 mg, Oral, Q4HRS PRN, John Albrecht D.O., 4 mg at 12/23/19 0735  •  insulin regular (HUMULIN R) injection 1-6 Units, 1-6 Units, Subcutaneous, 4X/DAY ACHS, Stopped at 12/21/19 0700 **AND** Accu-Chek ACHS, , , Q AC AND BEDTIME(S) **AND**  NOTIFY MD and PharmD, , , Once **AND** glucose 4 g chewable tablet 16 g, 16 g, Oral, Q15 MIN PRN **AND** DEXTROSE 10% BOLUS 250 mL, 250 mL, Intravenous, Q15 MIN PRN, John Albrecht D.O.  •  acetaminophen/caffeine/butalbital 325-40-50 mg (FIORICET) -40 MG per tablet 1 Tab, 1 Tab, Oral, Q6HRS PRN, John Albrecht D.O., 1 Tab at 12/21/19 2121  •  diphenhydrAMINE (BENADRYL) tablet/capsule 25 mg, 25 mg, Oral, HS PRN, John Albrecht D.O., 25 mg at 12/23/19 2152  •  Respiratory Care per Protocol, , Nebulization, Continuous RT, John Albrecht D.O.    Physical Examination:     Vitals:    12/23/19 1700 12/23/19 2000 12/24/19 0400 12/24/19 0800   BP: 139/77 118/79 120/69 114/70   Pulse: 82 (!) 105 99 75   Resp: 17 18 16 18   Temp: 36.2 °C (97.2 °F) 36.7 °C (98 °F) 36.9 °C (98.4 °F) 36.6 °C (97.8 °F)   TempSrc: Temporal Temporal Temporal Temporal   SpO2: 93% 92% 95% 94%   Weight:       Height:           General: Patient is awake and in no acute distress  Eyes: examination of optic disks not indicated at this time  CV: RRR    NEUROLOGICAL EXAM:     Mental status: Awake, alert and fully oriented, follows commands  Speech and language: speech is clear and fluent. The patient is able to name and repeat.  Cranial nerve exam: Pupils are equal, round and reactive to light bilaterally. Visual fields are full. Extraocular muscles are intact. Sensation in the face is intact to light touch. Face is symmetric. Hearing to finger rub equal. Palate elevates symmetrically. Shoulder shrug is full. Tongue is midline.  Motor exam: Strength is 5/5 in UE extremities both distally and proximally LE 1/5 HF and ADF . Tone is normal. No abnormal movements were seen on exam.  Sensory exam: No sensory deficits identified   Deep tendon reflexes:  Hyper reflexic left > right LE left clonus.  Coordination: no ataxia   Gait: deferred weakness    Objective Data:    Labs:  Lab Results   Component Value Date/Time    PROTHROMBTM 12.6 12/24/2019  05:05 AM    INR 0.93 12/24/2019 05:05 AM      Lab Results   Component Value Date/Time    WBC 7.4 12/24/2019 05:05 AM    WBC 6.1 07/20/2010 11:00 AM    RBC 4.59 12/24/2019 05:05 AM    RBC 4.38 07/20/2010 11:00 AM    HEMOGLOBIN 13.7 12/24/2019 05:05 AM    HEMATOCRIT 42.8 12/24/2019 05:05 AM    MCV 93.2 12/24/2019 05:05 AM    MCV 93 07/20/2010 11:00 AM    MCH 29.8 12/24/2019 05:05 AM    MCH 30.1 07/20/2010 11:00 AM    MCHC 32.0 (L) 12/24/2019 05:05 AM    MPV 11.3 12/24/2019 05:05 AM    NEUTSPOLYS 78.00 (H) 12/16/2019 05:56 AM    LYMPHOCYTES 14.80 (L) 12/16/2019 05:56 AM    MONOCYTES 4.80 12/16/2019 05:56 AM    EOSINOPHILS 1.50 12/16/2019 05:56 AM    BASOPHILS 0.40 12/16/2019 05:56 AM    ANISOCYTOSIS 1+ 07/08/2019 04:04 PM      Lab Results   Component Value Date/Time    SODIUM 139 12/24/2019 05:05 AM    POTASSIUM 3.7 12/24/2019 05:05 AM    CHLORIDE 107 12/24/2019 05:05 AM    CO2 20 12/24/2019 05:05 AM    GLUCOSE 101 (H) 12/24/2019 05:05 AM    BUN 17 12/24/2019 05:05 AM    CREATININE 0.87 12/24/2019 05:05 AM    CREATININE 0.75 (L) 07/20/2010 11:00 AM    BUNCREATRAT 19 07/20/2010 11:00 AM    GLOMRATE >59 07/20/2010 11:00 AM      Lab Results   Component Value Date/Time    CHOLSTRLTOT 150 11/08/2019 04:30 AM    LDL 70 11/08/2019 04:30 AM    HDL 50 11/08/2019 04:30 AM    TRIGLYCERIDE 149 11/08/2019 04:30 AM       Lab Results   Component Value Date/Time    ALKPHOSPHAT 54 12/16/2019 05:56 AM    ASTSGOT 14 12/16/2019 05:56 AM    ALTSGPT 30 12/16/2019 05:56 AM    TBILIRUBIN 0.8 12/16/2019 05:56 AM        Imaging/Testing:      Assessment and Plan:    Kristin Balderrama is a 29 y.o. female with relevant history of optic neuritis. Possible MS vs secondary mimic ie autoimmune or mitochondrial disease. On cellcept 1000 mg BID and pred taper currently on 10 mg daily. Unclear if new vision disturbance and LE worsening sxs are due to reactivation of inflammation or demyelination or the possibility of opportunistic infection with  ongoing DMT.  Rec LP pending with MS profile and parainfectious vectors including SCOTT virus HTLV1 West Nile Lyme Varicella and HSV.  Another possibility is sxs could be due to plethora of neuroactive meds.  MRI brain C and Tspine prior to bowel bladder stim in 2015 appear nl per my review.  Plan:    The evaluation of the patient, and recommended management, was discussed with the resident staff. I have performed a physical exam and reviewed and updated ROS and Plan today (12/24/2019). In review of yesterday's note (12/23/2019), there are no changes except as documented above.    Jose Ramires MD  Board Certified Neurology, ABPN  Board Certified Vascular Neurology, ABPN  (t) [phone]

## 2019-12-24 NOTE — CARE PLAN
Problem: Safety  Goal: Will remain free from injury  Outcome: PROGRESSING AS EXPECTED  Goal: Will remain free from falls  Outcome: PROGRESSING AS EXPECTED     Problem: Infection  Goal: Will remain free from infection  Outcome: PROGRESSING AS EXPECTED     Problem: Medication  Goal: Compliance with prescribed medication will improve  Outcome: PROGRESSING AS EXPECTED     Problem: Knowledge Deficit  Goal: Knowledge of the prescribed therapeutic regimen will improve  Outcome: PROGRESSING AS EXPECTED

## 2019-12-24 NOTE — CARE PLAN
Problem: Safety  Goal: Will remain free from injury  Outcome: PROGRESSING AS EXPECTED     Problem: Respiratory:  Goal: Respiratory status will improve  Outcome: PROGRESSING AS EXPECTED    On , maintaining sats > 95% on RA     Problem: Medication  Goal: Compliance with prescribed medication will improve  Outcome: PROGRESSING AS EXPECTED

## 2019-12-24 NOTE — THERAPY
"Speech Language Therapy Clinical Swallow Evaluation completed.  Functional Status: Patient currently on Dys1/NTL diet d/t reporting that she was choking on roast beef. Patient reported she eats a regular diet at home, but openly admitted that she often eats/drinks laying back and with rapid intake. Patient educated on swallow precautions and risk for aspiration. Patient's oral motor evaluation was WNL. Patient consumed PO trials of single ice chips, NTL via tsp and cup sip, pudding, soft solids, mixed consistencies, and thin liquids via cup sip and straw. Patient had no s/sx of aspiration on any consistencies trialed. Patient declined PO trials of crackers, reporting that she often \"has trouble\" with breads and tough meats. Patient reported preference for softer foods d/t same and declined mechanical soft diet. Laryngeal elevation palpated as complete and no changes in vocal quality noted. At this time, recommend patient upgrade to Dys2/thin liquid diet with intermittent supervision and use of swallow precautions. RN aware. SLP to follow. Of note, per MD, okay to cancel cognitive evaluation, as patient had psych evaluation for capacity today and does not appear to have cognitive deficits.     Recommendations - Diet: Dysphagia II, Thin Liquid                          Strategies: Monitor during meals and Head of Bed at 90 Degrees                          Medication Administration: Whole with Liquid Wash  Plan of Care: Will benefit from Speech Therapy 3 times per week during acute stay   Post-Acute Therapy: Anticipate that the patient will have no further speech therapy needs after discharge from the hospital.    See \"Rehab Therapy-Acute\" Patient Summary Report for complete documentation.   "

## 2019-12-24 NOTE — THERAPY
"Physical Therapy Treatment completed.   Bed Mobility:  Supine to Sit: Supervised  Transfers: Sit to Stand: Minimal Assist  Gait: Level Of Assist: Moderate Assist with Front-Wheel Walker       Plan of Care: Will benefit from Physical Therapy 3 times per week  Discharge Recommendations: Equipment: Will Continue to Assess for Equipment Needs. Post-acute therapy Recommend post-acute placement for continued physical therapy services prior to discharge home. Patient can tolerate post-acute therapies at a 5x/week frequency.       See \"Rehab Therapy-Acute\" Patient Summary Report for complete documentation.     Pt progressing well w/ therapy. Pt continues to be very tremulous during gait. She can only tolerate short bouts of gait before she just sits. Pt had multiple lateral LOB during gait requiring ModA to correct. Pt is very heavy footed during gait and kept c/o the  on the bottom of the sock aggravating her neuropathy. Pt requires a lot of cues to focus on gait as she gets easily distracted and has LOB. Pt continues to be a high fall risk and will benefit from post acute therapy.  "

## 2019-12-24 NOTE — PALLIATIVE CARE
Palliative Care follow-up  The certificate of competency and POLST form was signed by Dr. Santamaria.  Pt's AD was notarized.  POLST form scanned into pt's chart.          Thank you for allowing Palliative Care to participate in this patient's care. Please feel free to call [phone] with any questions or concerns.

## 2019-12-24 NOTE — CARE PLAN
Problem: Safety  Goal: Will remain free from injury  Outcome: PROGRESSING AS EXPECTED  Goal: Will remain free from falls  Outcome: PROGRESSING AS EXPECTED     Problem: Knowledge Deficit  Goal: Knowledge of the prescribed therapeutic regimen will improve  Outcome: PROGRESSING AS EXPECTED  Goal: Knowledge of disease process/condition, treatment plan, diagnostic tests, and medications will improve  Outcome: PROGRESSING AS EXPECTED     Problem: Psychosocial Needs:  Goal: Level of anxiety will decrease  Outcome: PROGRESSING AS EXPECTED

## 2019-12-25 PROBLEM — J45.20 MILD INTERMITTENT ASTHMA WITHOUT COMPLICATION: Status: ACTIVE | Noted: 2019-12-16

## 2019-12-25 LAB
GLUCOSE BLD-MCNC: 115 MG/DL (ref 65–99)
GLUCOSE BLD-MCNC: 121 MG/DL (ref 65–99)
GLUCOSE BLD-MCNC: 132 MG/DL (ref 65–99)
GLUCOSE BLD-MCNC: 165 MG/DL (ref 65–99)

## 2019-12-25 PROCEDURE — 700102 HCHG RX REV CODE 250 W/ 637 OVERRIDE(OP): Performed by: PSYCHIATRY & NEUROLOGY

## 2019-12-25 PROCEDURE — 700111 HCHG RX REV CODE 636 W/ 250 OVERRIDE (IP): Performed by: INTERNAL MEDICINE

## 2019-12-25 PROCEDURE — A9270 NON-COVERED ITEM OR SERVICE: HCPCS | Performed by: PSYCHIATRY & NEUROLOGY

## 2019-12-25 PROCEDURE — A9270 NON-COVERED ITEM OR SERVICE: HCPCS | Performed by: INTERNAL MEDICINE

## 2019-12-25 PROCEDURE — 99232 SBSQ HOSP IP/OBS MODERATE 35: CPT | Performed by: PSYCHIATRY & NEUROLOGY

## 2019-12-25 PROCEDURE — 700102 HCHG RX REV CODE 250 W/ 637 OVERRIDE(OP): Performed by: INTERNAL MEDICINE

## 2019-12-25 PROCEDURE — 770006 HCHG ROOM/CARE - MED/SURG/GYN SEMI*

## 2019-12-25 PROCEDURE — A9270 NON-COVERED ITEM OR SERVICE: HCPCS | Performed by: HOSPITALIST

## 2019-12-25 PROCEDURE — 82962 GLUCOSE BLOOD TEST: CPT | Mod: 91

## 2019-12-25 PROCEDURE — 99232 SBSQ HOSP IP/OBS MODERATE 35: CPT | Performed by: STUDENT IN AN ORGANIZED HEALTH CARE EDUCATION/TRAINING PROGRAM

## 2019-12-25 PROCEDURE — 700102 HCHG RX REV CODE 250 W/ 637 OVERRIDE(OP): Performed by: HOSPITALIST

## 2019-12-25 PROCEDURE — 700111 HCHG RX REV CODE 636 W/ 250 OVERRIDE (IP): Performed by: NURSE PRACTITIONER

## 2019-12-25 RX ORDER — ACETAZOLAMIDE 500 MG/1
500 CAPSULE, EXTENDED RELEASE ORAL DAILY
Status: DISCONTINUED | OUTPATIENT
Start: 2019-12-25 | End: 2019-12-31 | Stop reason: HOSPADM

## 2019-12-25 RX ORDER — ACETAZOLAMIDE 250 MG/1
250 TABLET ORAL ONCE
Status: COMPLETED | OUTPATIENT
Start: 2019-12-25 | End: 2019-12-25

## 2019-12-25 RX ADMIN — GABAPENTIN 200 MG: 100 CAPSULE ORAL at 12:04

## 2019-12-25 RX ADMIN — TRAZODONE HYDROCHLORIDE 50 MG: 100 TABLET ORAL at 17:53

## 2019-12-25 RX ADMIN — IBUPROFEN 800 MG: 800 TABLET, FILM COATED ORAL at 08:11

## 2019-12-25 RX ADMIN — FUROSEMIDE 80 MG: 40 TABLET ORAL at 04:02

## 2019-12-25 RX ADMIN — SODIUM BICARBONATE 650 MG: 650 TABLET ORAL at 04:02

## 2019-12-25 RX ADMIN — LEVOTHYROXINE SODIUM 75 MCG: 75 TABLET ORAL at 04:02

## 2019-12-25 RX ADMIN — MYCOPHENOLATE MOFETIL 1000 MG: 250 CAPSULE ORAL at 04:01

## 2019-12-25 RX ADMIN — ZIPRASIDONE HYDROCHLORIDE 80 MG: 80 CAPSULE ORAL at 04:04

## 2019-12-25 RX ADMIN — SENNOSIDES AND DOCUSATE SODIUM 2 TABLET: 8.6; 5 TABLET ORAL at 04:03

## 2019-12-25 RX ADMIN — BUTALBITAL, ACETAMINOPHEN, AND CAFFEINE 1 TABLET: 50; 325; 40 TABLET ORAL at 12:17

## 2019-12-25 RX ADMIN — GABAPENTIN 200 MG: 100 CAPSULE ORAL at 17:55

## 2019-12-25 RX ADMIN — BUSPIRONE HYDROCHLORIDE 10 MG: 10 TABLET ORAL at 04:03

## 2019-12-25 RX ADMIN — FAMOTIDINE 20 MG: 20 TABLET ORAL at 04:02

## 2019-12-25 RX ADMIN — ACETAMINOPHEN 650 MG: 325 TABLET, FILM COATED ORAL at 03:40

## 2019-12-25 RX ADMIN — FLUOXETINE HYDROCHLORIDE 40 MG: 20 CAPSULE ORAL at 04:02

## 2019-12-25 RX ADMIN — INSULIN HUMAN 1 UNITS: 100 INJECTION, SOLUTION PARENTERAL at 08:21

## 2019-12-25 RX ADMIN — POTASSIUM CHLORIDE 20 MEQ: 20 TABLET, EXTENDED RELEASE ORAL at 04:02

## 2019-12-25 RX ADMIN — GABAPENTIN 200 MG: 100 CAPSULE ORAL at 19:18

## 2019-12-25 RX ADMIN — BUSPIRONE HYDROCHLORIDE 10 MG: 10 TABLET ORAL at 17:56

## 2019-12-25 RX ADMIN — BUDESONIDE AND FORMOTEROL FUMARATE DIHYDRATE 2 PUFF: 160; 4.5 AEROSOL RESPIRATORY (INHALATION) at 04:03

## 2019-12-25 RX ADMIN — PREGABALIN 300 MG: 100 CAPSULE ORAL at 17:54

## 2019-12-25 RX ADMIN — SENNOSIDES AND DOCUSATE SODIUM 2 TABLET: 8.6; 5 TABLET ORAL at 17:55

## 2019-12-25 RX ADMIN — ENOXAPARIN SODIUM 40 MG: 100 INJECTION SUBCUTANEOUS at 04:01

## 2019-12-25 RX ADMIN — ACETAZOLAMIDE 250 MG: 250 TABLET ORAL at 19:20

## 2019-12-25 RX ADMIN — MYCOPHENOLATE MOFETIL 1000 MG: 250 CAPSULE ORAL at 19:19

## 2019-12-25 RX ADMIN — POTASSIUM CHLORIDE 20 MEQ: 20 TABLET, EXTENDED RELEASE ORAL at 17:56

## 2019-12-25 RX ADMIN — MECLIZINE HYDROCHLORIDE 12.5 MG: 25 TABLET ORAL at 12:05

## 2019-12-25 RX ADMIN — ACETAMINOPHEN 650 MG: 325 TABLET, FILM COATED ORAL at 23:56

## 2019-12-25 RX ADMIN — BUDESONIDE AND FORMOTEROL FUMARATE DIHYDRATE 2 PUFF: 160; 4.5 AEROSOL RESPIRATORY (INHALATION) at 19:19

## 2019-12-25 RX ADMIN — ZIPRASIDONE HYDROCHLORIDE 80 MG: 80 CAPSULE ORAL at 18:11

## 2019-12-25 RX ADMIN — OXCARBAZEPINE 150 MG: 150 TABLET, FILM COATED ORAL at 04:04

## 2019-12-25 RX ADMIN — TIOTROPIUM BROMIDE 1 CAPSULE: 18 CAPSULE ORAL; RESPIRATORY (INHALATION) at 04:06

## 2019-12-25 RX ADMIN — ASPIRIN 81 MG: 81 TABLET, COATED ORAL at 04:03

## 2019-12-25 RX ADMIN — SODIUM BICARBONATE 650 MG: 650 TABLET ORAL at 17:56

## 2019-12-25 RX ADMIN — OXCARBAZEPINE 150 MG: 150 TABLET, FILM COATED ORAL at 17:55

## 2019-12-25 RX ADMIN — MODAFINIL 100 MG: 100 TABLET ORAL at 04:07

## 2019-12-25 RX ADMIN — PREGABALIN 300 MG: 100 CAPSULE ORAL at 04:02

## 2019-12-25 RX ADMIN — PREDNISONE 10 MG: 10 TABLET ORAL at 04:03

## 2019-12-25 ASSESSMENT — ENCOUNTER SYMPTOMS
NERVOUS/ANXIOUS: 0
WEAKNESS: 1
FEVER: 0
HEADACHES: 1
TINGLING: 1
DEPRESSION: 0

## 2019-12-25 NOTE — PROGRESS NOTES
Neurology Progress Note  Neurohospitalist Service, [facility]    Referring Physician: Francois Santamaria D.O.    Chief Complaint   Patient presents with   • Syncope     found down by mother, states that she last saw her 30min prior.    • Dizziness       HPI: Refer to initial documented Neurology H&P, as detailed in the patient's chart.    Interval History 12/25/19:   Improved sxs less HA and better vision    Review of systems: In addition to what is detailed in the HPI and/or updated in the interval history, all other systems reviewed and are negative.    Past Medical History:    has a past medical history of Abdominal pain, Anginal syndrome, Apnea, sleep, Arrhythmia, Arthritis, ASTHMA, Atrial fibrillation (HCC), Back pain, Borderline personality disorder (HCC), Breath shortness, Bronchitis, Cardiac arrhythmia, Chickenpox, Chronic UTI (9/18/2010), Cough, Daytime sleepiness, Depression, Diabetes (HCC), Diarrhea, Disorder of thyroid, Fall, Fatigue, Frequent headaches, Gasping for breath, Gynecological disorder, Headache(784.0), Heart burn, History of falling, Hypertension, Indigestion, Migraine, 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HCC), 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HCC), Nausea, Obesity, Pain (08-15-12), Painful joint, PCOS (polycystic ovarian syndrome), Pneumonia (2012), Psychosis (HCC), Renal disorder, Ringing in ears, Scoliosis, Shortness of breath, Sinus tachycardia (10/31/2013), Sleep apnea, Snoring, Tonsillitis, Tuberculosis, Urinary bladder disorder, Urinary incontinence, Weakness, and Wears glasses.    FHx:  family history includes Genitourinary () Problems in her sister; Heart Disease in her maternal grandmother and mother; Hypertension in her maternal grandmother, maternal uncle, and mother; No Known Problems in her sister; Other in her mother and sister; Sleep Apnea in her mother.    SHx:   reports that she has never smoked. She has never used smokeless tobacco. She reports current drug use.  Frequency: 7.00 times per week. Drugs: Marijuana and Oral. She reports that she does not drink alcohol.    Medications:    Current Facility-Administered Medications:   •  acetaZOLAMIDE (DIAMOX) tablet 250 mg, 250 mg, Oral, Once, Jose Ramires M.D.  •  [START ON 12/26/2019] acetaZOLAMIDE SR (DIAMOX) capsule 500 mg, 500 mg, Oral, DAILY, Jose Ramires M.D.  •  LORazepam (ATIVAN) injection 1 mg, 1 mg, Intramuscular, PRN, Jose Ramires M.D., 1 mg at 12/24/19 1055  •  predniSONE (DELTASONE) tablet 10 mg, 10 mg, Oral, DAILY, Dmitriy Harper M.D., 10 mg at 12/25/19 0403  •  OXcarbazepine (TRILEPTAL) tablet 150 mg, 150 mg, Oral, BID, Alexandria Sigala M.D., 150 mg at 12/25/19 0404  •  gabapentin (NEURONTIN) capsule 200 mg, 200 mg, Oral, 4X/DAY, Alexandria Sigala M.D., 200 mg at 12/25/19 1204  •  traZODone (DESYREL) tablet 50 mg, 50 mg, Oral, Q EVENING, Alexandria Sigala M.D., 50 mg at 12/24/19 1732  •  modafinil (PROVIGIL) tablet 100 mg, 100 mg, Oral, QAM, Alexandria Sigala M.D., 100 mg at 12/25/19 0407  •  ibuprofen (MOTRIN) tablet 800 mg, 800 mg, Oral, TID PRN, Dmitriy Harper M.D., 800 mg at 12/25/19 0811  •  pregabalin (LYRICA) capsule 300 mg, 300 mg, Oral, BID, John Albrecht D.O., 300 mg at 12/25/19 0402  •  Ivabradine HCl TABS 7.5 mg, 7.5 mg, Oral, BID, Beto Hendrix M.D., 7.5 mg at 12/25/19 0407  •  meclizine (ANTIVERT) tablet 12.5 mg, 12.5 mg, Oral, TID PRN, Beto Hendrix M.D., 12.5 mg at 12/25/19 1205  •  albuterol inhaler 2 Puff, 2 Puff, Inhalation, Q4HRS PRN, John Albrecht D.O., 2 Puff at 12/22/19 0448  •  albuterol (PROVENTIL) 2.5mg/0.5ml nebulizer solution 2.5 mg, 2.5 mg, Nebulization, Q4H PRN (RT), John Albrecht D.O., 2.5 mg at 12/18/19 1623  •  enoxaparin (LOVENOX) inj 40 mg, 40 mg, Subcutaneous, DAILY, VANIA Sanon, 40 mg at 12/25/19 0401  •  aspirin EC (ECOTRIN) tablet 81 mg, 81 mg, Oral, DAILY, John Albrecht D.O., 81 mg at  12/25/19 0403  •  budesonide-formoterol (SYMBICORT) 160-4.5 MCG/ACT inhaler 2 Puff, 2 Puff, Inhalation, BID, John Albrecht D.O., 2 Puff at 12/25/19 0403  •  busPIRone (BUSPAR) tablet 10 mg, 10 mg, Oral, BID, John Albrecht D.O., 10 mg at 12/25/19 0403  •  FLUoxetine (PROZAC) capsule 40 mg, 40 mg, Oral, DAILY, John Albrecht D.O., 40 mg at 12/25/19 0402  •  furosemide (LASIX) tablet 80 mg, 80 mg, Oral, DAILY, John Albrecht D.O., 80 mg at 12/25/19 0402  •  levothyroxine (SYNTHROID) tablet 75 mcg, 75 mcg, Oral, AM ES, John Albrecht D.O., 75 mcg at 12/25/19 0402  •  mycophenolate (CELLCEPT) capsule 1,000 mg, 1,000 mg, Oral, BID, John Albrecht D.O., 1,000 mg at 12/25/19 0401  •  potassium chloride SA (Kdur) tablet 20 mEq, 20 mEq, Oral, BID, John Albrecht D.O., 20 mEq at 12/25/19 0402  •  famotidine (PEPCID) tablet 20 mg, 20 mg, Oral, QAM, John Albrecht D.O., 20 mg at 12/25/19 0402  •  sodium bicarbonate tablet 650 mg, 650 mg, Oral, BID, John Albrecht D.O., 650 mg at 12/25/19 0402  •  tiotropium (SPIRIVA) 18 MCG inhalation capsule 1 Cap, 1 Cap, Inhalation, DAILY, John Albrecht D.O., 1 Cap at 12/25/19 0406  •  ziprasidone (GEODON) capsule 80 mg, 80 mg, Oral, BID, John Albrecht D.O., 80 mg at 12/25/19 0404  •  senna-docusate (PERICOLACE or SENOKOT S) 8.6-50 MG per tablet 2 Tab, 2 Tab, Oral, BID, 2 Tab at 12/25/19 0403 **AND** polyethylene glycol/lytes (MIRALAX) PACKET 1 Packet, 1 Packet, Oral, QDAY PRN, 1 Packet at 12/14/19 1714 **AND** magnesium hydroxide (MILK OF MAGNESIA) suspension 30 mL, 30 mL, Oral, QDAY PRN **AND** bisacodyl (DULCOLAX) suppository 10 mg, 10 mg, Rectal, QDAY PRN, ABBY YoonO.  •  acetaminophen (TYLENOL) tablet 650 mg, 650 mg, Oral, Q6HRS PRN, John Albrecht D.O., 650 mg at 12/25/19 0340  •  enalaprilat (VASOTEC) injection 1.25 mg, 1.25 mg, Intravenous, Q6HRS PRN, ANTONI Yoon.O.  •  ondansetron (ZOFRAN ODT) dispertab 4 mg, 4 mg, Oral, Q4HRS PRN, John SHORT  OLMAN Albrecht, 4 mg at 12/23/19 0735  •  insulin regular (HUMULIN R) injection 1-6 Units, 1-6 Units, Subcutaneous, 4X/DAY ACHS, Stopped at 12/25/19 1100 **AND** Accu-Chek ACHS, , , Q AC AND BEDTIME(S) **AND** NOTIFY MD and PharmD, , , Once **AND** glucose 4 g chewable tablet 16 g, 16 g, Oral, Q15 MIN PRN **AND** DEXTROSE 10% BOLUS 250 mL, 250 mL, Intravenous, Q15 MIN PRN, John Albrecht D.O.  •  acetaminophen/caffeine/butalbital 325-40-50 mg (FIORICET) -40 MG per tablet 1 Tab, 1 Tab, Oral, Q6HRS PRN, John Albrecht D.O., 1 Tab at 12/25/19 1217  •  diphenhydrAMINE (BENADRYL) tablet/capsule 25 mg, 25 mg, Oral, HS PRN, ABBY YoonO., 25 mg at 12/23/19 2152  •  Respiratory Care per Protocol, , Nebulization, Continuous RT, John Albrecht D.O.    Physical Examination:     Vitals:    12/24/19 1700 12/24/19 2000 12/25/19 0400 12/25/19 0700   BP: 109/58 111/55 124/60 121/68   Pulse: 71 74 75 79   Resp: 18 18 18 18   Temp: 36.6 °C (97.8 °F) 36.2 °C (97.1 °F) 36.3 °C (97.3 °F) 36.4 °C (97.5 °F)   TempSrc: Temporal Temporal Temporal Temporal   SpO2: 92% 93% 95% 93%   Weight:       Height:           General: Patient is awake and in no acute distress  Eyes: examination of optic disks not indicated at this time  CV: RRR    NEUROLOGICAL EXAM:     Mental status: Awake, alert and fully oriented, follows commands  Speech and language: speech is clear and fluent. The patient is able to name and repeat.  Cranial nerve exam: Pupils are equal, round and reactive to light bilaterally. Visual fields are full. Extraocular muscles are intact. Sensation in the face is intact to light touch. Face is symmetric. Hearing to finger rub equal. Palate elevates symmetrically. Shoulder shrug is full. Tongue is midline.  Motor exam: Strength is 5/5 in all upper extremities both distally and proximally. LE HF KF KE ADF APF weakness 1-2/5 Tone is normal. No abnormal movements were seen on exam.  Sensory exam: No sensory deficits  identified   Deep tendon reflexes:  Hyperreflexia LE bilat Left > right clonus on leftCoordination: no ataxia   Gait: deferred not tested    Objective Data:    Labs:  Lab Results   Component Value Date/Time    PROTHROMBTM 12.6 12/24/2019 05:05 AM    INR 0.93 12/24/2019 05:05 AM      Lab Results   Component Value Date/Time    WBC 7.4 12/24/2019 05:05 AM    WBC 6.1 07/20/2010 11:00 AM    RBC 4.59 12/24/2019 05:05 AM    RBC 4.38 07/20/2010 11:00 AM    HEMOGLOBIN 13.7 12/24/2019 05:05 AM    HEMATOCRIT 42.8 12/24/2019 05:05 AM    MCV 93.2 12/24/2019 05:05 AM    MCV 93 07/20/2010 11:00 AM    MCH 29.8 12/24/2019 05:05 AM    MCH 30.1 07/20/2010 11:00 AM    MCHC 32.0 (L) 12/24/2019 05:05 AM    MPV 11.3 12/24/2019 05:05 AM    NEUTSPOLYS 78.00 (H) 12/16/2019 05:56 AM    LYMPHOCYTES 14.80 (L) 12/16/2019 05:56 AM    MONOCYTES 4.80 12/16/2019 05:56 AM    EOSINOPHILS 1.50 12/16/2019 05:56 AM    BASOPHILS 0.40 12/16/2019 05:56 AM    ANISOCYTOSIS 1+ 07/08/2019 04:04 PM      Lab Results   Component Value Date/Time    SODIUM 139 12/24/2019 05:05 AM    POTASSIUM 3.7 12/24/2019 05:05 AM    CHLORIDE 107 12/24/2019 05:05 AM    CO2 20 12/24/2019 05:05 AM    GLUCOSE 101 (H) 12/24/2019 05:05 AM    BUN 17 12/24/2019 05:05 AM    CREATININE 0.87 12/24/2019 05:05 AM    CREATININE 0.75 (L) 07/20/2010 11:00 AM    BUNCREATRAT 19 07/20/2010 11:00 AM    GLOMRATE >59 07/20/2010 11:00 AM      Lab Results   Component Value Date/Time    CHOLSTRLTOT 150 11/08/2019 04:30 AM    LDL 70 11/08/2019 04:30 AM    HDL 50 11/08/2019 04:30 AM    TRIGLYCERIDE 149 11/08/2019 04:30 AM       Lab Results   Component Value Date/Time    ALKPHOSPHAT 54 12/16/2019 05:56 AM    ASTSGOT 14 12/16/2019 05:56 AM    ALTSGPT 30 12/16/2019 05:56 AM    TBILIRUBIN 0.8 12/16/2019 05:56 AM        Imaging/Testing:  CSF reviewed    Assessment and Plan:    Kristin Balderrama is a 29 y.o. female with relevant history of optic neuritis. On cellecept and pred.  New visual sxs and LE pain and  weakness.  LP done to exclude active inflammation or opportunistic infection.  Thus far tests nl. Pending MS profile and parainfectious vectors.  OP 25 cm H2O suggestion possible pseudotumor.  Pt did notice less HA and improved vision after LP. Started diamox and will see if repeat LP needed or possible optic nerve sheath fenestration.    Plan:    The evaluation of the patient, and recommended management, was discussed with the resident staff. I have performed a physical exam and reviewed and updated ROS and Plan today (12/25/2019). In review of yesterday's note (12/24/2019), there are no changes except as documented above.    Jose Ramires MD  Board Certified Neurology, ABPN  Board Certified Vascular Neurology, ABPN  (t) [phone]

## 2019-12-25 NOTE — PROGRESS NOTES
[facility] Medicine Daily Progress Note    Date of Service: 12/24/2019   Hospital Day: 9 Pat. Class: Inpatient     Chief Complaint   Patient presents with   • Syncope     found down by mother, states that she last saw her 30min prior.    • Dizziness    Interval Problem Update  Patient was seen and evaluated today for Syncope  Disposition: Remain IP   Hospital Course     ER Course  Patient's mother arrived home and found the patient down on the floor.  The patient had no memory of the event.  The mother did not witness any seizure activity.  Maximal amount of downtime 30 minutes.  The patient not had a fever chills or cough.  No complaints of chest pain shortness of breath or abdominal pain.  She has not had any complaints of headache neck stiffness or photophobia.  No incontinence occurred.  No other somatic    Admission Day Course  29 y.o. female who presented 12/13/2019 with altered level consciousness.  At this point in time, patient is mostly nonverbal, information obtained from her grandmother who was the one who found her.  She states whenever she left she was sleeping but just prior to this had been normal.  She states this morning she had some shortness of breath, she does have a history of asthma.  She did use breathing treatments and a home nurse helped with her breathing and it did seem to normalize.  Her grandmother left, was gone around 90 minutes, upon returning home she found her down, unconscious.  She did call 911.  She did attempt to wake up her granddaughter for quite some time and eventually she did wake up but seemed very groggy and confused.  She states she did just finish her antibiotics for her UTI/bronchitis, otherwise has been taking her usual home medications.  She has not been missing any medications nor taking extra of her medications.  I did discuss the case including labs and imaging with the ER physician.  12/14 - This morning the patient reports 8/10 frontal headache, unsteadiness, new  "blurry vision, persistent dizziness, bilateral lower extremity neuropathy, not feeling well, nausea, constipation and not being ready for discharge home. She denies shortness of breath, chest pain, dysuria. She ambulated to the bathroom with nursing supervision without incident  12/15 - Patient is complaining of having dizziness, she feels dehydrated and just overall not feeling well.  Denies any fevers or chills.  Is complaining of lower extremity spasms as well.  12/16 - Patient is still complaining of mild nausea, having back spasms which is chronic, 7/10 in severity at times. Feels like she needs more time to feel better. Denies fevers/chills, denies dysuria or diarrhea.  12/17. ON chart review: History of cellulitis in the breast. 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 Immunocompromised, on prednisone and Cellcept for optic neuritis and \"poor immune system\"; assumably she follows Dr. Newton, Neuro-Ophthalmology. History of atrial fibrillation and cardiomyopathy? On Lasix, aspirin and ivadrabine. July 2019 echo: Normal left ventricular systolic function..Left ventricular ejection fraction is visually estimated to be 65%.. Normal left ventricular wall thickness. Normal left atrial size..Trace mitral regurgitation. Structurally normal aortic valve without significant stenosis or Regurgitation. Estimated right ventricular systolic pressure  is 30 mmHg. Trace tricuspid regurgitation.. Normal right ventricular size. Also chronic pain and neuropathy. Patient fell per nursing and now mentions she has intractable R hip pain and can't walk. Ordered Hip XR and that turned out to be normal, no arthritis. Later she mentioned she choked on a piece of meat and is short of breath, She however speaks full sentences, not hypoxic, not wheezing. . She is on several pain medications and anti-inflammatories; sees Pain Clinic and Dr. Cabral, Neuroophthalmology. She mentions she is on low dose steroids prescribed to her by  " "Herchwe.  12/18. Patient \"fell to knees\" again. Discussed with Dr. Sabillon in depth.  12/19. Unchanged nonspecific complaints of pain and weakness. I called dr. Chowdary, who is following her for optic neuritis. I then called Dr. Rincon, and Cande Jennings, Neurology  12/20. No issues or complaints currently. Grandmother at bedside with questions.. SW/Palliative asking about competency eval as patient desires to be DNR.. For now I tony lorder Speech for cognitive eval. Psych has been involved so may need formal neurocognitive testing for competency  12/21. No new issues or concerns when I saw her. Later nurse reports she is having \"vision problems\" similar to optic neuritis flare. Of note, she is on high dose prednisone.   12/22. She feels she has \"eye sypmtoms\" and paresthesias. Her grandmother worried about her eating that she is not getting enough because she needs solid foods. She is on a pureed diet.   12/23 - Patient is still complaining of mild nausea, having back spasms which is chronic, 7/10 in severity at times. Feels like she needs more time to feel better. Denies fevers/chills, denies dysuria or diarrhea  12/24 - Patient waiting to be transferred to nursing home to have someone take care of her.. no new developments. Patient consumes excessive amount of diet dr aguilar.      Code Status: Full Code  Consultants/Specialty:  Psych, Nuroloyg, Palliative Care Procedures During Hospitalization:  None   Lines, Drains, Airways, Wounds  Lines:  Drains:  Airway:  Wounds: VTE prophylaxis: Lovenox        Diet Order Diabetic    Assessment/Plan  * Altered level of consciousness- (present on admission)  Assessment & Plan  Highly suspect this is secondary to polypharmacy, patient is on a lot of sedating medications  She does have a history of accidental overdose in the past but denies this  No seizure activity noted, no evidence of rhabdomyolysis  Currently mentation has resolved, is AAOx4 past 2 days.  He had a long " "discussion in terms of decreasing her sedating medications.  12/17. No changes in her pain regimen and sedatives  Follow up to her Pain Clinic.  12/18. Discussed with Dr. Sabillon  Not very clear diagnoses but managed for functional transverse myelitis, possible optic neuritis, seen by Dr. Newton  Spine stimulator limits further evaluation for other causes of enceiphalopathy/weakness/falls such as MRIs.  Taper of gabapentin and transition to Trileptal  Modenalfil also started  Monitor her symptoms of weakness causing her to fall  12/19. May have a functional component but Dr. Chowdary feels there could be neurologic disease  He recommended formal neurology consult to r/o transverse myelitis or CNS lesions and see if it is acceptable to get a CT myelogram since MRI is contraindicated due to her gastric pacer/stimulator  Discussed with Neurology.   Has had CT myelogram in the past per their records and seen Dr. Fox. Has been nondiagnostic.  Currently no other possible myelopathy diagnosis.  Symptomatology also inconsistent as she has reflexes  She has been worked up for myopathies; on Dr. Fox's notes in the past, muscle biopsy was nondiagnostic.  In the past IV Solumedrol was given and per Dr. Fox, shpowed some benefit for a diagnosis of \"paralysis\" and \"lower motor neuron etiology\". According to patient she has been also referred to [facility] before and she may possibly have a mitochondrial type mypopathy or neuromyopathy. This was 2 years ago however and recently she has been experiencing weakness and falls again.  Currently agree that no need for CT myelogram right now, will evaluate if high dose steroids will be of any benefit otherwise she will have to follow-up with Dr. Fox.  Ordered 3d course of Solu-medrol to see if there is any benefit, as Neurology recommended no harm to try what worked in the past.  12/20.  Discussed plan with grandmother and patient  Will do the steroid trial. Poor IV " access so switch to prednisone 60x 1 followe dby pred 10 as per Dr. 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 Inpatient Neurology has no further comment and recommnd following Dr. Fox, neurology for further management  Meanwhile grandmother admits she cannot care for her 24/7 so she is awaiting SNF/rehab/ SW working on it.  12/22. Ordered supplements. Dietary and Speech consults  12/23. Consulted Dr. Ramires, neurology for second opinion. 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 She has a history of back surgery, obesity and she feels she needs anesthesia (does not want bedside LP) therefore ordered head CT followed by IR guided LP. Ordered encephalitis panel. Left message to Dr. Chowdary who isn't in the office today/vacation.  12/24 - Lumbar puncture today.  Patient's mentation is quite improved.  States she needs to have her grandmother be her caregiver.  Her grandmother is struggling to take care of her grandfather with dementia.  Patient wants to go to a rehab/nursing facility.  Agree with potential for this to be polypharmacy.  Neuro pending    [facility] care planning/counseling discussion  Assessment & Plan  12/20. Patient has indicated to Palliative that she wants to be DNR  Currently she does not have a diagnosis to say that she is terminal  She also has been followed by Psychiatry and has had altered mentation in the past  I explained to Palliative and SW that a formal neuro cognitive eval needs to be done for competency as I can only assess capacity  SLP to do cognitive eval and will let me know if formal cognitive eval needed, and if so I will consult Psychology.  12/21. Speech for cognitive eval, and if they recommend formal testing for competency will have Psychology evaluate.,  By definition:   Competency is a global assessment and legal  determination made by a  in court. Capacity is a functional assessment and a clinical determination about a specific decision that can be made by any clinician familiar with a patient’s case. Hospitalists frequently encounter situations in which a patient’s capacity is called into question; in most cases, this is a determination a hospitalist can make independent of consultants.  12/23. Awaiting SLP for cognitive eval. Reconsult Psych to assess if there is any behavioral component to affect her competency. Psychology consulted; depends on SLP and Psychiatry. If cognitive eval normal and no behaviporal issue/depression or suicidality from the aove evaluating services then DNR/DNI POLST and documentation for competency can be signed.  12/24 - Stable for now.  Patient really struggling more with self-defeating and choices of continuing to be victim to her past hurts.  Palliative care states patient desires to be a DNR.      Morbidly obese (HCC)- (present on admission)  Assessment & Plan  Body mass index is 46.26 kg/m².  Encourage healthy lifestyle and physical activity.    History of optic neuritis  Assessment & Plan  12/24 - stable  - Previous Plans by Other Providers -  Per her report and she follows Dr. Wilkins.  12/22. Call Dr. Yoder tomorrow.    Mild intermittent asthma without complication  Assessment & Plan  12/24 - stable. Patient had a 6 pack of 20oz bottles of diet dr pepper on the table.  - Previous Plans by Other Providers -  Controlled on Symbicort, montelukast  Continue RT protocol, duo nebs, Pep therapy if warranted, and incentive spirometry.       Mastoiditis  Assessment & Plan  12/24 - stable  - Previous Plans by Other Providers -  CT head shows Stable partial opacification of left mastoid air cells, consider effusion or component of mastoiditis as clinically appropriate  Resume augmentin.    Immunocompromised (HCC)- (present on admission)  Assessment & Plan  12/24 - stable  - Previous Plans  by Other Providers -  On steroids and immunosuppressants for optic neuritis followed byu Dr. Newton, NeuroOphthalmology    Acquired hypothyroidism- (present on admission)  Assessment & Plan  12/24 - stable  - Previous Plans by Other Providers -  Resume home dose of Synthroid  Last month her TSH was within normal limits    Type 2 diabetes mellitus with hyperglycemia, without long-term current use of insulin (HCC)- (present on admission)  Assessment & Plan  12/24 -stable  - Previous Plans by Other Providers -  Continue Insulin-sliding scale, accu-checks and hypoglycemia protocol.  Continue with Consistent Carbohydrate diet   Hold home dose of trulicity   Results from last 7 days   Lab Units 12/25/19  1804 12/25/19  1208 12/25/19  0813 12/24/19  2045 12/24/19  1053 12/24/19  0750 12/23/19  2102 12/23/19  1725   ACCU CHECK GLUCOSE 788 mg/dL 132* 121* 165* 86 113* 112* 106* 91         Depression- (present on admission)  Assessment & Plan  12/24 - stable  - Previous Plans by Other Providers -  Resume home dose of Prozac    GERD (gastroesophageal reflux disease)- (present on admission)  Assessment & Plan  Continue with Pepcid    Borderline personality disorder in adult (HCC)- (present on admission)  Assessment & Plan  12/24 - stable  - Previous Plans by Other Providers -  Continue with home dose of Prozac, Geodon and trazodone         Laboratory  Results from last 7 days   Lab Units 12/24/19  0505 12/23/19  0352   WBC 1501 K/uL 7.4 7.4   HGB 1503 g/dL 13.7 13.4   HCT 1504 % 42.8 40.9   PLATELET COUNT 1518 K/uL 150* 145*    Results from last 7 days   Lab Units 12/24/19  0505 12/23/19  0352   SODIUM 101 mmol/L 139 139   POTASSIUM 102 mmol/L 3.7 3.4*   CHLORIDE 103 mmol/L 107 106   CO2 104 mmol/L 20 25   ANION GAP ANION  12.0* 8.0    mg/dL 17 17   CREATININE 109 mg/dL 0.87 0.75   CALCIUM 105 mg/dL 9.0 9.0   GLUCOSE 112 mg/dL 101* 89   IF ALYSSA AMER 503474 mL/min/1.73 m 2 >60 >60   IF NON  AMER 109GFRB  mL/min/1.73 m 2 >60 >60             Intake/Output Summary (Last 24 hours) at 12/24/2019 2053  Last data filed at 12/24/2019 1200  Gross per 24 hour   Intake 1182 ml   Output --   Net 1182 ml    Vital Signs  Temp:  [36.2 °C (97.1 °F)-36.9 °C (98.4 °F)] 36.2 °C (97.1 °F)  Pulse:  [71-99] 74  Resp:  [16-18] 18  BP: (109-120)/(55-70) 111/55  SpO2:  [92 %-95 %] 93 %     Review of Systems  Review of Systems   Constitutional: Negative for fever and malaise/fatigue.   Skin: Negative for itching and rash.   Neurological: Positive for tingling, weakness and headaches.   Psychiatric/Behavioral: Negative for depression. The patient is not nervous/anxious.     Physical Exam  Physical Exam   Constitutional: She is oriented to person, place, and time. No distress.   Profoundly watery body habitus   Musculoskeletal:         General: Edema present. No deformity.   Neurological: She is alert and oriented to person, place, and time.   Skin: Skin is warm and dry. She is not diaphoretic.   Psychiatric:   Odd affect. Wants her grandmother to take care of her while she has to take care of her grand father with dementia.  She's 29 years old and expects her grandmother to be her caregiver.. not the other way around...   Nursing note and vitals reviewed.       Medications  predniSONE, 10 mg, Oral, DAILY  OXcarbazepine, 150 mg, Oral, BID  gabapentin, 200 mg, Oral, 4X/DAY  traZODone, 50 mg, Oral, Q EVENING  modafinil, 100 mg, Oral, QAM  pregabalin, 300 mg, Oral, BID  Ivabradine HCl, 7.5 mg, Oral, BID  enoxaparin (LOVENOX) injection, 40 mg, Subcutaneous, DAILY  aspirin EC, 81 mg, Oral, DAILY  budesonide-formoterol, 2 Puff, Inhalation, BID  busPIRone, 10 mg, Oral, BID  FLUoxetine, 40 mg, Oral, DAILY  furosemide, 80 mg, Oral, DAILY  levothyroxine, 75 mcg, Oral, AM ES  mycophenolate, 1,000 mg, Oral, BID  potassium chloride SA, 20 mEq, Oral, BID  famotidine, 20 mg, Oral, QAM  sodium bicarbonate, 650 mg, Oral, BID  tiotropium, 1 Cap, Inhalation,  DAILY  ziprasidone, 80 mg, Oral, BID  senna-docusate, 2 Tab, Oral, BID  insulin regular, 1-6 Units, Subcutaneous, 4X/DAY ACHS         Medications were reviewed today.     Imaging  DX-LUMBAR PUNCTURE FOR DIAGNOSIS   Final Result      1.  Fluoroscopic-guided lumbar puncture as described above.   2.  Opening pressure of 25 mm of water.      CT-HEAD W/O   Final Result      No acute intracranial abnormality.      DX-HIP-UNILATERAL-WITH PELVIS-1 VIEW RIGHT   Final Result      No acute osseous abnormality.      CT-HEAD W/O   Final Result         1.  No acute intracranial abnormality.   2.  Stable partial opacification of left mastoid air cells, consider effusion or component of mastoiditis as clinically appropriate      DX-CHEST-PORTABLE (1 VIEW)   Final Result      No evidence of acute cardiopulmonary process.

## 2019-12-25 NOTE — PROGRESS NOTES
Aaox4.as of this time calm & pleasant with me.no complaints as of this time.patient able to sleep.continuing hourly rounding.

## 2019-12-25 NOTE — DISCHARGE PLANNING
A choice form was received for [facility].  [facility] already declined due to Medicaid.    Inpatient Rehab was received.  There is not a referral to physiatry in the workque.  Referral can't be sent at this time.    RN SARA GRIJALVA notified via [employer].

## 2019-12-25 NOTE — PALLIATIVE CARE
Palliative Care follow-up  Pt's AD was scanned into pt's chart.  LSW provided original AD to pt.      Thank you for allowing Palliative Care to participate in this patient's care. Please feel free to call [phone] with any questions or concerns.

## 2019-12-26 LAB
GLUCOSE BLD-MCNC: 104 MG/DL (ref 65–99)
GLUCOSE BLD-MCNC: 107 MG/DL (ref 65–99)
GLUCOSE BLD-MCNC: 122 MG/DL (ref 65–99)
GLUCOSE BLD-MCNC: 139 MG/DL (ref 65–99)

## 2019-12-26 PROCEDURE — 99232 SBSQ HOSP IP/OBS MODERATE 35: CPT | Performed by: PSYCHIATRY & NEUROLOGY

## 2019-12-26 PROCEDURE — A9270 NON-COVERED ITEM OR SERVICE: HCPCS | Performed by: PSYCHIATRY & NEUROLOGY

## 2019-12-26 PROCEDURE — 700111 HCHG RX REV CODE 636 W/ 250 OVERRIDE (IP): Performed by: INTERNAL MEDICINE

## 2019-12-26 PROCEDURE — 700102 HCHG RX REV CODE 250 W/ 637 OVERRIDE(OP): Performed by: INTERNAL MEDICINE

## 2019-12-26 PROCEDURE — 99232 SBSQ HOSP IP/OBS MODERATE 35: CPT | Performed by: STUDENT IN AN ORGANIZED HEALTH CARE EDUCATION/TRAINING PROGRAM

## 2019-12-26 PROCEDURE — A9270 NON-COVERED ITEM OR SERVICE: HCPCS | Performed by: INTERNAL MEDICINE

## 2019-12-26 PROCEDURE — 700102 HCHG RX REV CODE 250 W/ 637 OVERRIDE(OP): Performed by: PSYCHIATRY & NEUROLOGY

## 2019-12-26 PROCEDURE — 770006 HCHG ROOM/CARE - MED/SURG/GYN SEMI*

## 2019-12-26 PROCEDURE — 700111 HCHG RX REV CODE 636 W/ 250 OVERRIDE (IP): Performed by: NURSE PRACTITIONER

## 2019-12-26 PROCEDURE — 82962 GLUCOSE BLOOD TEST: CPT | Mod: 91

## 2019-12-26 RX ADMIN — SENNOSIDES AND DOCUSATE SODIUM 2 TABLET: 8.6; 5 TABLET ORAL at 04:22

## 2019-12-26 RX ADMIN — GABAPENTIN 200 MG: 100 CAPSULE ORAL at 17:24

## 2019-12-26 RX ADMIN — IBUPROFEN 800 MG: 800 TABLET, FILM COATED ORAL at 01:26

## 2019-12-26 RX ADMIN — ZIPRASIDONE HYDROCHLORIDE 80 MG: 80 CAPSULE ORAL at 04:22

## 2019-12-26 RX ADMIN — ACETAMINOPHEN 650 MG: 325 TABLET, FILM COATED ORAL at 07:57

## 2019-12-26 RX ADMIN — POTASSIUM CHLORIDE 20 MEQ: 20 TABLET, EXTENDED RELEASE ORAL at 04:22

## 2019-12-26 RX ADMIN — ENOXAPARIN SODIUM 40 MG: 100 INJECTION SUBCUTANEOUS at 04:23

## 2019-12-26 RX ADMIN — ACETAZOLAMIDE 500 MG: 500 CAPSULE, EXTENDED RELEASE ORAL at 17:27

## 2019-12-26 RX ADMIN — FUROSEMIDE 80 MG: 40 TABLET ORAL at 04:23

## 2019-12-26 RX ADMIN — ASPIRIN 81 MG: 81 TABLET, COATED ORAL at 04:21

## 2019-12-26 RX ADMIN — MODAFINIL 100 MG: 100 TABLET ORAL at 04:22

## 2019-12-26 RX ADMIN — FLUOXETINE HYDROCHLORIDE 40 MG: 20 CAPSULE ORAL at 04:23

## 2019-12-26 RX ADMIN — TIOTROPIUM BROMIDE 1 CAPSULE: 18 CAPSULE ORAL; RESPIRATORY (INHALATION) at 04:22

## 2019-12-26 RX ADMIN — ZIPRASIDONE HYDROCHLORIDE 80 MG: 80 CAPSULE ORAL at 17:24

## 2019-12-26 RX ADMIN — LEVOTHYROXINE SODIUM 75 MCG: 75 TABLET ORAL at 04:23

## 2019-12-26 RX ADMIN — OXCARBAZEPINE 150 MG: 150 TABLET, FILM COATED ORAL at 17:26

## 2019-12-26 RX ADMIN — GABAPENTIN 200 MG: 100 CAPSULE ORAL at 19:03

## 2019-12-26 RX ADMIN — BUDESONIDE AND FORMOTEROL FUMARATE DIHYDRATE 2 PUFF: 160; 4.5 AEROSOL RESPIRATORY (INHALATION) at 17:22

## 2019-12-26 RX ADMIN — TRAZODONE HYDROCHLORIDE 50 MG: 100 TABLET ORAL at 17:23

## 2019-12-26 RX ADMIN — SODIUM BICARBONATE 650 MG: 650 TABLET ORAL at 17:24

## 2019-12-26 RX ADMIN — BUDESONIDE AND FORMOTEROL FUMARATE DIHYDRATE 2 PUFF: 160; 4.5 AEROSOL RESPIRATORY (INHALATION) at 04:21

## 2019-12-26 RX ADMIN — SENNOSIDES AND DOCUSATE SODIUM 2 TABLET: 8.6; 5 TABLET ORAL at 17:27

## 2019-12-26 RX ADMIN — BUTALBITAL, ACETAMINOPHEN, AND CAFFEINE 1 TABLET: 50; 325; 40 TABLET ORAL at 14:26

## 2019-12-26 RX ADMIN — PREDNISONE 10 MG: 10 TABLET ORAL at 04:22

## 2019-12-26 RX ADMIN — PREGABALIN 300 MG: 100 CAPSULE ORAL at 04:22

## 2019-12-26 RX ADMIN — BUSPIRONE HYDROCHLORIDE 10 MG: 10 TABLET ORAL at 04:21

## 2019-12-26 RX ADMIN — MYCOPHENOLATE MOFETIL 1000 MG: 250 CAPSULE ORAL at 04:23

## 2019-12-26 RX ADMIN — ALBUTEROL SULFATE 2 PUFF: 90 AEROSOL, METERED RESPIRATORY (INHALATION) at 04:21

## 2019-12-26 RX ADMIN — GABAPENTIN 200 MG: 100 CAPSULE ORAL at 12:17

## 2019-12-26 RX ADMIN — MYCOPHENOLATE MOFETIL 1000 MG: 250 CAPSULE ORAL at 17:23

## 2019-12-26 RX ADMIN — PREGABALIN 300 MG: 100 CAPSULE ORAL at 17:24

## 2019-12-26 RX ADMIN — GABAPENTIN 200 MG: 100 CAPSULE ORAL at 07:55

## 2019-12-26 RX ADMIN — OXCARBAZEPINE 150 MG: 150 TABLET, FILM COATED ORAL at 04:22

## 2019-12-26 RX ADMIN — POTASSIUM CHLORIDE 20 MEQ: 20 TABLET, EXTENDED RELEASE ORAL at 17:27

## 2019-12-26 RX ADMIN — IBUPROFEN 800 MG: 800 TABLET, FILM COATED ORAL at 11:10

## 2019-12-26 RX ADMIN — FAMOTIDINE 20 MG: 20 TABLET ORAL at 04:23

## 2019-12-26 RX ADMIN — SODIUM BICARBONATE 650 MG: 650 TABLET ORAL at 04:23

## 2019-12-26 RX ADMIN — BUSPIRONE HYDROCHLORIDE 10 MG: 10 TABLET ORAL at 17:26

## 2019-12-26 RX ADMIN — DIPHENHYDRAMINE HYDROCHLORIDE 25 MG: 25 TABLET ORAL at 19:02

## 2019-12-26 NOTE — PROGRESS NOTES
Kristin has been stable on this shift. She has not complained of chest pain or shortness of breath. VSS.

## 2019-12-26 NOTE — CARE PLAN
Problem: Safety  Goal: Will remain free from injury  Outcome: PROGRESSING AS EXPECTED  Note:   Safety education provided. Safety precautions in place.      Problem: Discharge Barriers/Planning  Goal: Patient's continuum of care needs will be met  Outcome: PROGRESSING SLOWER THAN EXPECTED

## 2019-12-26 NOTE — PROGRESS NOTES
Neurology Progress Note  Neurohospitalist Service, [facility]    Referring Physician: Francois Santamaria D.O.    Chief Complaint   Patient presents with   • Syncope     found down by mother, states that she last saw her 30min prior.    • Dizziness       HPI: Refer to initial documented Neurology H&P, as detailed in the patient's chart.    Interval History 12/26/19: Enjoying a pie this morning    Review of systems: In addition to what is detailed in the HPI and/or updated in the interval history, all other systems reviewed and are negative.    Past Medical History:    has a past medical history of Abdominal pain, Anginal syndrome, Apnea, sleep, Arrhythmia, Arthritis, ASTHMA, Atrial fibrillation (HCC), Back pain, Borderline personality disorder (HCC), Breath shortness, Bronchitis, Cardiac arrhythmia, Chickenpox, Chronic UTI (9/18/2010), Cough, Daytime sleepiness, Depression, Diabetes (HCC), Diarrhea, Disorder of thyroid, Fall, Fatigue, Frequent headaches, Gasping for breath, Gynecological disorder, Headache(784.0), Heart burn, History of falling, Hypertension, Indigestion, Migraine, 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HCC), 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HCC), Nausea, Obesity, Pain (08-15-12), Painful joint, PCOS (polycystic ovarian syndrome), Pneumonia (2012), Psychosis (HCC), Renal disorder, Ringing in ears, Scoliosis, Shortness of breath, Sinus tachycardia (10/31/2013), Sleep apnea, Snoring, Tonsillitis, Tuberculosis, Urinary bladder disorder, Urinary incontinence, Weakness, and Wears glasses.    FHx:  family history includes Genitourinary () Problems in her sister; Heart Disease in her maternal grandmother and mother; Hypertension in her maternal grandmother, maternal uncle, and mother; No Known Problems in her sister; Other in her mother and sister; Sleep Apnea in her mother.    SHx:   reports that she has never smoked. She has never used smokeless tobacco. She reports current drug use. Frequency: 7.00  times per week. Drugs: Marijuana and Oral. She reports that she does not drink alcohol.    Medications:    Current Facility-Administered Medications:   •  acetaZOLAMIDE SR (DIAMOX) capsule 500 mg, 500 mg, Oral, DAILY, Jose Ramires M.D., Stopped at 12/25/19 1930  •  LORazepam (ATIVAN) injection 1 mg, 1 mg, Intramuscular, PRN, Jose Ramires M.D., 1 mg at 12/24/19 1055  •  predniSONE (DELTASONE) tablet 10 mg, 10 mg, Oral, DAILY, Dmitriy Harper M.D., 10 mg at 12/26/19 0422  •  OXcarbazepine (TRILEPTAL) tablet 150 mg, 150 mg, Oral, BID, Alexandria Sigala M.D., 150 mg at 12/26/19 0422  •  gabapentin (NEURONTIN) capsule 200 mg, 200 mg, Oral, 4X/DAY, Alexandria Sigala M.D., 200 mg at 12/26/19 0755  •  traZODone (DESYREL) tablet 50 mg, 50 mg, Oral, Q EVENING, Alexandria Sigala M.D., 50 mg at 12/25/19 1753  •  modafinil (PROVIGIL) tablet 100 mg, 100 mg, Oral, QAM, Alexandria Sigala M.D., 100 mg at 12/26/19 0422  •  ibuprofen (MOTRIN) tablet 800 mg, 800 mg, Oral, TID PRN, Dmitriy Harper M.D., 800 mg at 12/26/19 0126  •  pregabalin (LYRICA) capsule 300 mg, 300 mg, Oral, BID, John Albrecht D.O., 300 mg at 12/26/19 0422  •  Ivabradine HCl TABS 7.5 mg, 7.5 mg, Oral, BID, Beto Hendrix M.D., 7.5 mg at 12/26/19 0600  •  meclizine (ANTIVERT) tablet 12.5 mg, 12.5 mg, Oral, TID PRN, Beto Hendrix M.D., 12.5 mg at 12/25/19 1205  •  albuterol inhaler 2 Puff, 2 Puff, Inhalation, Q4HRS PRN, John Albrecht D.O., 2 Puff at 12/26/19 0421  •  albuterol (PROVENTIL) 2.5mg/0.5ml nebulizer solution 2.5 mg, 2.5 mg, Nebulization, Q4H PRN (RT), John Albrecht D.O., 2.5 mg at 12/18/19 1623  •  enoxaparin (LOVENOX) inj 40 mg, 40 mg, Subcutaneous, DAILY, VANIA Sanon, 40 mg at 12/26/19 0423  •  aspirin EC (ECOTRIN) tablet 81 mg, 81 mg, Oral, DAILY, John Albrecht D.O., 81 mg at 12/26/19 0421  •  budesonide-formoterol (SYMBICORT) 160-4.5 MCG/ACT inhaler 2 Puff, 2 Puff,  Inhalation, BID, ANTONI Yoon.O., 2 Puff at 12/26/19 0421  •  busPIRone (BUSPAR) tablet 10 mg, 10 mg, Oral, BID, John Albrecht D.O., 10 mg at 12/26/19 0421  •  FLUoxetine (PROZAC) capsule 40 mg, 40 mg, Oral, DAILY, ANTONI Yoon.O., 40 mg at 12/26/19 0423  •  furosemide (LASIX) tablet 80 mg, 80 mg, Oral, DAILY, John Albrecht D.O., 80 mg at 12/26/19 0423  •  levothyroxine (SYNTHROID) tablet 75 mcg, 75 mcg, Oral, AM ES, John Albrecht D.O., 75 mcg at 12/26/19 0423  •  mycophenolate (CELLCEPT) capsule 1,000 mg, 1,000 mg, Oral, BID, ANTONI Yoon.O., 1,000 mg at 12/26/19 0423  •  potassium chloride SA (Kdur) tablet 20 mEq, 20 mEq, Oral, BID, John Albrecht D.O., 20 mEq at 12/26/19 0422  •  famotidine (PEPCID) tablet 20 mg, 20 mg, Oral, QAM, John Albrecht D.O., 20 mg at 12/26/19 0423  •  sodium bicarbonate tablet 650 mg, 650 mg, Oral, BID, ANTONI Yoon.O., 650 mg at 12/26/19 0423  •  tiotropium (SPIRIVA) 18 MCG inhalation capsule 1 Cap, 1 Cap, Inhalation, DAILY, ANTONI Yoon.O., 1 Cap at 12/26/19 0422  •  ziprasidone (GEODON) capsule 80 mg, 80 mg, Oral, BID, John Albrecht D.O., 80 mg at 12/26/19 0422  •  senna-docusate (PERICOLACE or SENOKOT S) 8.6-50 MG per tablet 2 Tab, 2 Tab, Oral, BID, 2 Tab at 12/26/19 0422 **AND** polyethylene glycol/lytes (MIRALAX) PACKET 1 Packet, 1 Packet, Oral, QDAY PRN, 1 Packet at 12/14/19 1714 **AND** magnesium hydroxide (MILK OF MAGNESIA) suspension 30 mL, 30 mL, Oral, QDAY PRN **AND** bisacodyl (DULCOLAX) suppository 10 mg, 10 mg, Rectal, QDAY PRN, ANTONI Yoon.O.  •  acetaminophen (TYLENOL) tablet 650 mg, 650 mg, Oral, Q6HRS PRN, ABBY YoonO., 650 mg at 12/26/19 0757  •  enalaprilat (VASOTEC) injection 1.25 mg, 1.25 mg, Intravenous, Q6HRS PRN, ANTONI Yoon.O.  •  ondansetron (ZOFRAN ODT) dispertab 4 mg, 4 mg, Oral, Q4HRS PRN, ANTONI Yoon.O., 4 mg at 12/23/19 0735  •  insulin regular (HUMULIN R) injection 1-6 Units,  1-6 Units, Subcutaneous, 4X/DAY ACHS, Stopped at 12/25/19 1100 **AND** Accu-Chek ACHS, , , Q AC AND BEDTIME(S) **AND** NOTIFY MD and PharmD, , , Once **AND** glucose 4 g chewable tablet 16 g, 16 g, Oral, Q15 MIN PRN **AND** DEXTROSE 10% BOLUS 250 mL, 250 mL, Intravenous, Q15 MIN PRN, John Albrecht D.O.  •  acetaminophen/caffeine/butalbital 325-40-50 mg (FIORICET) -40 MG per tablet 1 Tab, 1 Tab, Oral, Q6HRS PRN, John Albrecht D.O., 1 Tab at 12/25/19 1217  •  diphenhydrAMINE (BENADRYL) tablet/capsule 25 mg, 25 mg, Oral, HS PRN, ANTONI Yoon.O., 25 mg at 12/23/19 2152  •  Respiratory Care per Protocol, , Nebulization, Continuous RT, John Albrecht D.O.    Physical Examination:     Vitals:    12/25/19 1914 12/25/19 2000 12/26/19 0400 12/26/19 0815   BP: 109/60 137/89 111/58 109/57   Pulse: 91 90 71 77   Resp: 18 18 18 16   Temp: 36.4 °C (97.6 °F) 36.7 °C (98 °F) 37.4 °C (99.3 °F) 36.3 °C (97.4 °F)   TempSrc: Temporal Temporal Temporal Temporal   SpO2: 93% 96% 91% 93%   Weight:       Height:           General: Patient is awake and in no acute distress  Eyes: examination of optic disks not indicated at this time  CV: RRR    NEUROLOGICAL EXAM:     Mental status: Awake, alert and fully oriented, follows commands  Speech and language: speech is clear and fluent. The patient is able to name and repeat.  Cranial nerve exam: Pupils are equal, round and reactive to light bilaterally. Visual fields are full. Extraocular muscles are intact. Sensation in the face is intact to light touch. Face is symmetric. Hearing to finger rub equal. Palate elevates symmetrically. Shoulder shrug is full. Tongue is midline.  Motor exam: Strength is 5/5 in all extremities both distally and proximally for UE.  LE limited HF KF KE ADF APF,1-2/5 Tone is normal. No abnormal movements were seen on exam.  Sensory exam: No sensory deficits identified   Deep tendon reflexes:  Hyper reflexia left UE > right with clonus on  left  Coordination: no ataxia   Gait: deferred not tested    Objective Data:    Labs:  Lab Results   Component Value Date/Time    PROTHROMBTM 12.6 12/24/2019 05:05 AM    INR 0.93 12/24/2019 05:05 AM      Lab Results   Component Value Date/Time    WBC 7.4 12/24/2019 05:05 AM    WBC 6.1 07/20/2010 11:00 AM    RBC 4.59 12/24/2019 05:05 AM    RBC 4.38 07/20/2010 11:00 AM    HEMOGLOBIN 13.7 12/24/2019 05:05 AM    HEMATOCRIT 42.8 12/24/2019 05:05 AM    MCV 93.2 12/24/2019 05:05 AM    MCV 93 07/20/2010 11:00 AM    MCH 29.8 12/24/2019 05:05 AM    MCH 30.1 07/20/2010 11:00 AM    MCHC 32.0 (L) 12/24/2019 05:05 AM    MPV 11.3 12/24/2019 05:05 AM    NEUTSPOLYS 78.00 (H) 12/16/2019 05:56 AM    LYMPHOCYTES 14.80 (L) 12/16/2019 05:56 AM    MONOCYTES 4.80 12/16/2019 05:56 AM    EOSINOPHILS 1.50 12/16/2019 05:56 AM    BASOPHILS 0.40 12/16/2019 05:56 AM    ANISOCYTOSIS 1+ 07/08/2019 04:04 PM      Lab Results   Component Value Date/Time    SODIUM 139 12/24/2019 05:05 AM    POTASSIUM 3.7 12/24/2019 05:05 AM    CHLORIDE 107 12/24/2019 05:05 AM    CO2 20 12/24/2019 05:05 AM    GLUCOSE 101 (H) 12/24/2019 05:05 AM    BUN 17 12/24/2019 05:05 AM    CREATININE 0.87 12/24/2019 05:05 AM    CREATININE 0.75 (L) 07/20/2010 11:00 AM    BUNCREATRAT 19 07/20/2010 11:00 AM    GLOMRATE >59 07/20/2010 11:00 AM      Lab Results   Component Value Date/Time    CHOLSTRLTOT 150 11/08/2019 04:30 AM    LDL 70 11/08/2019 04:30 AM    HDL 50 11/08/2019 04:30 AM    TRIGLYCERIDE 149 11/08/2019 04:30 AM       Lab Results   Component Value Date/Time    ALKPHOSPHAT 54 12/16/2019 05:56 AM    ASTSGOT 14 12/16/2019 05:56 AM    ALTSGPT 30 12/16/2019 05:56 AM    TBILIRUBIN 0.8 12/16/2019 05:56 AM        Imaging/Testing:    Assessment and Plan:    Kristin Balderrama is a 29 y.o. female with relevant history of optic neuritis bilat on pred and cellcept with new sxs of vision changes and HA.  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 Placed on diamox after procedure nd  pts HA and vision sxs seem t be stable.  May need repeat LP or increase in dose of diamox or ultimately optic nerve sheath fenestration.  Will need to discuss with pts primary neurologist to see if can taper off pred as weight gain and steroids can contribute to pseudotumor. No evidence of spinal inflammation or opportunistic infection.  Pts diet will need to be addressed as well.  Plan:    The evaluation of the patient, and recommended management, was discussed with the resident staff. I have performed a physical exam and reviewed and updated ROS and Plan today (12/26/2019). In review of yesterday's note (12/25/2019), there are no changes except as documented above.    Jose Ramires MD  Board Certified Neurology, ABPN  Board Certified Vascular Neurology, ABPN  (t) [phone]

## 2019-12-26 NOTE — CARE PLAN
Problem: Safety  Goal: Will remain free from falls  Outcome: PROGRESSING AS EXPECTED     Problem: Respiratory:  Goal: Respiratory status will improve  Outcome: PROGRESSING AS EXPECTED     Problem: Mobility  Goal: Risk for activity intolerance will decrease  Outcome: PROGRESSING AS EXPECTED

## 2019-12-26 NOTE — CARE PLAN
Problem: Safety  Goal: Will remain free from injury  Outcome: PROGRESSING AS EXPECTED     Problem: Medication  Goal: Compliance with prescribed medication will improve  Outcome: PROGRESSING AS EXPECTED     Problem: Knowledge Deficit  Goal: Knowledge of disease process/condition, treatment plan, diagnostic tests, and medications will improve  Outcome: PROGRESSING AS EXPECTED     Problem: Communication  Goal: The ability to communicate needs accurately and effectively will improve  Outcome: PROGRESSING AS EXPECTED

## 2019-12-26 NOTE — PROGRESS NOTES
[facility] Medicine Daily Progress Note    Date of Service: 12/25/2019   Hospital Day: 10 Pat. Class: Inpatient     Chief Complaint   Patient presents with   • Syncope     found down by mother, states that she last saw her 30min prior.    • Dizziness    Interval Problem Update  Patient was seen and evaluated today for Syncope  Disposition: Remain IP   Hospital Course     ER Course  Patient's mother arrived home and found the patient down on the floor.  The patient had no memory of the event.  The mother did not witness any seizure activity.  Maximal amount of downtime 30 minutes.  The patient not had a fever chills or cough.  No complaints of chest pain shortness of breath or abdominal pain.  She has not had any complaints of headache neck stiffness or photophobia.  No incontinence occurred.  No other somatic    Admission Day Course  29 y.o. female who presented 12/13/2019 with altered level consciousness.  At this point in time, patient is mostly nonverbal, information obtained from her grandmother who was the one who found her.  She states whenever she left she was sleeping but just prior to this had been normal.  She states this morning she had some shortness of breath, she does have a history of asthma.  She did use breathing treatments and a home nurse helped with her breathing and it did seem to normalize.  Her grandmother left, was gone around 90 minutes, upon returning home she found her down, unconscious.  She did call 911.  She did attempt to wake up her granddaughter for quite some time and eventually she did wake up but seemed very groggy and confused.  She states she did just finish her antibiotics for her UTI/bronchitis, otherwise has been taking her usual home medications.  She has not been missing any medications nor taking extra of her medications.  I did discuss the case including labs and imaging with the ER physician.  12/14 - This morning the patient reports 8/10 frontal headache, unsteadiness,  "new blurry vision, persistent dizziness, bilateral lower extremity neuropathy, not feeling well, nausea, constipation and not being ready for discharge home. She denies shortness of breath, chest pain, dysuria. She ambulated to the bathroom with nursing supervision without incident  12/15 - Patient is complaining of having dizziness, she feels dehydrated and just overall not feeling well.  Denies any fevers or chills.  Is complaining of lower extremity spasms as well.  12/16 - Patient is still complaining of mild nausea, having back spasms which is chronic, 7/10 in severity at times. Feels like she needs more time to feel better. Denies fevers/chills, denies dysuria or diarrhea.  12/17. ON chart review: History of cellulitis in the breast. 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 Immunocompromised, on prednisone and Cellcept for optic neuritis and \"poor immune system\"; assumably she follows Dr. Newton, Neuro-Ophthalmology. History of atrial fibrillation and cardiomyopathy? On Lasix, aspirin and ivadrabine. July 2019 echo: Normal left ventricular systolic function..Left ventricular ejection fraction is visually estimated to be 65%.. Normal left ventricular wall thickness. Normal left atrial size..Trace mitral regurgitation. Structurally normal aortic valve without significant stenosis or Regurgitation. Estimated right ventricular systolic pressure  is 30 mmHg. Trace tricuspid regurgitation.. Normal right ventricular size. Also chronic pain and neuropathy. Patient fell per nursing and now mentions she has intractable R hip pain and can't walk. Ordered Hip XR and that turned out to be normal, no arthritis. Later she mentioned she choked on a piece of meat and is short of breath, She however speaks full sentences, not hypoxic, not wheezing. . She is on several pain medications and anti-inflammatories; sees Pain Clinic and Dr. Cabral, Neuroophthalmology. She mentions she is on low dose steroids prescribed to her by " "Dr. Wilkins.  12/18. Patient \"fell to knees\" again. Discussed with Dr. Sabillon in depth.  12/19. Unchanged nonspecific complaints of pain and weakness. I called dr. Chowdary, who is following her for optic neuritis. I then called Dr. Rincon, and Cande Jennings, Neurology  12/20. No issues or complaints currently. Grandmother at bedside with questions.. SW/Palliative asking about competency eval as patient desires to be DNR.. For now I tony josemanuel Speech for cognitive eval. Psych has been involved so may need formal neurocognitive testing for competency  12/21. No new issues or concerns when I saw her. Later nurse reports she is having \"vision problems\" similar to optic neuritis flare. Of note, she is on high dose prednisone.   12/22. She feels she has \"eye sypmtoms\" and paresthesias. Her grandmother worried about her eating that she is not getting enough because she needs solid foods. She is on a pureed diet.   12/23 - Patient is still complaining of mild nausea, having back spasms which is chronic, 7/10 in severity at times. Feels like she needs more time to feel better. Denies fevers/chills, denies dysuria or diarrhea  12/24 - Patient waiting to be transferred to nursing home to have someone take care of her.. no new developments. Patient consumes excessive amount of diet dr aguilar.  12/25 - Awaiting placement.  Patient awaiting rehab.  Neurology concerned this is very much a poly-pharmacy problem, to which I am in complete agreement.  Will gently address concerns about this with the patient tomorrow.      Code Status: DNAR/DNI  Consultants/Specialty:  Psych, Neurology, Palliative Care Procedures During Hospitalization:  None   Lines, Drains, Airways, Wounds  Lines:  Drains:  Airway:  Wounds: VTE prophylaxis: Lovenox        Diet Order Diabetic    Assessment/Plan  * Altered level of consciousness- (present on admission)  Assessment & Plan  Highly suspect this is secondary to polypharmacy, patient is on a lot of " "sedating medications. She does have a history of accidental overdose in the past but denies this. No seizure activity noted, no evidence of rhabdomyolysis. Currently mentation has resolved, is AAOx4 past 2 days. He had a long discussion in terms of decreasing her sedating medications.  12/17. No changes in her pain regimen and sedatives. Follow up to her Pain Clinic.  12/18. Discussed with Dr. Sabillon. Not very clear diagnoses but managed for functional transverse myelitis, possible optic neuritis, seen by Dr. Newton. Spine stimulator limits further evaluation for other causes of enceiphalopathy/weakness/falls such as MRIs. Taper of gabapentin and transition to Trileptal. Modenalfil also started. Monitor her symptoms of weakness causing her to fall  12/19. May have a functional component but Dr. Chowdary feels there could be neurologic disease. He recommended formal neurology consult to r/o transverse myelitis or CNS lesions and see if it is acceptable to get a CT myelogram since MRI is contraindicated due to her gastric pacer/stimulator, Discussed with Neurology. Has had CT myelogram in the past per their records and seen Dr. Fox. Has been nondiagnostic. Currently no other possible myelopathy diagnosis. Symptomatology also inconsistent as she has reflexes. She has been worked up for myopathies; on Dr. Fox's notes in the past, muscle biopsy was nondiagnostic.  In the past IV Solumedrol was given and per Dr. Fox, shpowed some benefit for a diagnosis of \"paralysis\" and \"lower motor neuron etiology\". According to patient she [facility] and she may possibly have a mitochondrial type mypopathy or neuromyopathy. This was 2 years ago however and recently she has been experiencing weakness and falls again. Currently agree that no need for CT myelogram right now, will evaluate if high dose steroids will be of any benefit otherwise she will have to follow-up with Dr. Fox. " Ordered 3d course of Solu-medrol to see if there is any benefit, as Neurology recommended no harm to try what worked in the past.  12/20. Discussed plan with grandmother and patient. Will do the steroid trial. Poor IV access so switch to prednisone 60x 1 followe dby pred 10 as per Dr. Chowdary's outpatient regimen for optic neuritis NOS. 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 Inpatient Neurology has no further comment and recommnd following Dr. Fox, neurology for further management. Meanwhile grandmother admits she cannot care for her 24/7 so she is awaiting SNF/rehab/ SW working on it.  12/22. Ordered supplements. Dietary and Speech consults  12/23. Consulted Dr. Ramires, neurology for second opinion. 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 She has a history of back surgery, obesity and she feels she needs anesthesia (does not want bedside LP) therefore ordered head CT followed by IR guided LP. Ordered encephalitis panel. Left message to Dr. Chowdary who isn't in the office today/vacation.  12/24 - Lumbar puncture today.  Patient's mentation is quite improved.  States she needs to have her grandmother be her caregiver.  Her grandmother is struggling to take care of her grandfather with dementia.  Patient wants to go to a rehab/nursing facility.  Agree with potential for this to be polypharmacy.  Neuro pending  12/25 - LP complete - Opening pressures of 25, neuro started acetazolamide. Symptomatically, ALOC is considered resolved.    Advanced care planning/counseling discussion  Assessment & Plan  12/20. Patient has indicated to Palliative that she wants to be DNR. Currently she does not have a diagnosis to say that she is terminal. She also has been followed by Psychiatry and has had altered mentation in the past. I explained to Palliative and SW that a formal neuro cognitive eval needs to be done for  competency as I can only assess capacity. SLP to do cognitive eval and will let me know if formal cognitive eval needed, and if so I will consult Psychology.  12/21. Speech for cognitive eval, and if they recommend formal testing for competency will have Psychology evaluate., By definition: Competency is a global assessment and legal determination made by a  in court. Capacity is a functional assessment and a clinical determination about a specific decision that can be made by any clinician familiar with a patient’s case. Hospitalists frequently encounter situations in which a patient’s capacity is called into question; in most cases, this is a determination a hospitalist can make independent of consultants.  12/23. Awaiting SLP for cognitive eval. Reconsult Psych to assess if there is any behavioral component to affect her competency. Psychology consulted; depends on SLP and Psychiatry. If cognitive eval normal and no behaviporal issue/depression or suicidality from the aove evaluating services then DNR/DNI POLST and documentation for competency can be signed.  12/24-12/25 - Stable for now.  Patient really struggling more with self-defeating and choices of continuing to be victim to her past hurts.  Palliative care states patient desires to be a DNR.      Morbidly obese (HCC)- (present on admission)  Assessment & Plan  Body mass index is 46.26 kg/m².  Encourage healthy lifestyle and physical activity.    History of optic neuritis  Assessment & Plan  12/24-12/25 - stable  - Previous Plans by Other Providers -  Per her report and she follows Dr. Wilkins.  12/22. Call Dr. Yoder tomorrow.    Mild intermittent asthma without complication  Assessment & Plan  12/25 - Patient's 6-pack of 20oz dr aguilar is gone... might be a daily overhydration total body interstitial edema from severe soda intake component to her asthma...  12/24 - stable. Patient had a 6 pack of 20oz bottles of diet dr pepper on the table.  -  Previous Plans by Other Providers -  Controlled on Symbicort, montelukast  Continue RT protocol, duo nebs, Pep therapy if warranted, and incentive spirometry.       Mastoiditis  Assessment & Plan  12/24-12/25 - stable  - Previous Plans by Other Providers -  CT head shows Stable partial opacification of left mastoid air cells, consider effusion or component of mastoiditis as clinically appropriate  Resume augmentin.    Immunocompromised (HCC)- (present on admission)  Assessment & Plan  12/24-12/24 - stable  - Previous Plans by Other Providers -  On steroids and immunosuppressants for optic neuritis followed byrahel Newton, NeuroOphthalmology    Acquired hypothyroidism- (present on admission)  Assessment & Plan  12/24-12/25 - stable  - Previous Plans by Other Providers -  Resume home dose of Synthroid  Last month her TSH was within normal limits    Type 2 diabetes mellitus with hyperglycemia, without long-term current use of insulin (HCC)- (present on admission)  Assessment & Plan  12/24-12/25 -stable  - Previous Plans by Other Providers -  Continue Insulin-sliding scale, accu-checks and hypoglycemia protocol.  Continue with Consistent Carbohydrate diet   Hold home dose of trulicity   Results from last 7 days   Lab Units 12/25/19  1804 12/25/19  1208 12/25/19  0813 12/24/19  2045 12/24/19  1053 12/24/19  0750 12/23/19  2102 12/23/19  1725   ACCU CHECK GLUCOSE 788 mg/dL 132* 121* 165* 86 113* 112* 106* 91         Depression- (present on admission)  Assessment & Plan  12/24-12/25 - stable  - Previous Plans by Other Providers -  Resume home dose of Prozac    GERD (gastroesophageal reflux disease)- (present on admission)  Assessment & Plan  Continue with Pepcid    Borderline personality disorder in adult (HCC)- (present on admission)  Assessment & Plan  12/24-12/25 - stable  - Previous Plans by Other Providers -  Continue with home dose of Prozac, Geodon and trazodone         Laboratory  Results from last 7 days   Lab  Units 12/24/19  0505 12/23/19  0352   WBC 1501 K/uL 7.4 7.4   HGB 1503 g/dL 13.7 13.4   HCT 1504 % 42.8 40.9   PLATELET COUNT 1518 K/uL 150* 145*    Results from last 7 days   Lab Units 12/24/19  0505 12/23/19  0352   SODIUM 101 mmol/L 139 139   POTASSIUM 102 mmol/L 3.7 3.4*   CHLORIDE 103 mmol/L 107 106   CO2 104 mmol/L 20 25   ANION GAP ANION  12.0* 8.0    mg/dL 17 17   CREATININE 109 mg/dL 0.87 0.75   CALCIUM 105 mg/dL 9.0 9.0   GLUCOSE 112 mg/dL 101* 89   IF ALYSSA AMER [id number] mL/min/1.73 m 2 >60 >60   IF NON AFRICAN AMER [id number] mL/min/1.73 m 2 >60 >60             Intake/Output Summary (Last 24 hours) at 12/25/2019 2049  Last data filed at 12/25/2019 1255  Gross per 24 hour   Intake 760 ml   Output --   Net 760 ml    Vital Signs  Temp:  [36.2 °C (97.1 °F)-36.7 °C (98 °F)] 36.7 °C (98 °F)  Pulse:  [] 90  Resp:  [18] 18  BP: (109-137)/(60-89) 137/89  SpO2:  [93 %-96 %] 96 %     Review of Systems  Review of Systems   Constitutional: Negative for fever and malaise/fatigue.   Skin: Negative for itching and rash.   Neurological: Positive for tingling, weakness and headaches.   Psychiatric/Behavioral: Negative for depression. The patient is not nervous/anxious.     Physical Exam  Physical Exam   Constitutional: She is oriented to person, place, and time. No distress.   Profoundly watery body habitus   Musculoskeletal:         General: Edema present. No deformity.   Neurological: She is alert and oriented to person, place, and time.   Skin: Skin is warm and dry. She is not diaphoretic.   Psychiatric:   Odd affect. Wants her grandmother to take care of her while she has to take care of her grand father with dementia.  She's 29 years old and expects her grandmother to be her caregiver.. not the other way around...   Nursing note and vitals reviewed.       Medications  acetaZOLAMIDE SR, 500 mg, Oral, DAILY  predniSONE, 10 mg, Oral, DAILY  OXcarbazepine, 150 mg, Oral, BID  gabapentin, 200 mg, Oral,  4X/DAY  traZODone, 50 mg, Oral, Q EVENING  modafinil, 100 mg, Oral, QAM  pregabalin, 300 mg, Oral, BID  Ivabradine HCl, 7.5 mg, Oral, BID  enoxaparin (LOVENOX) injection, 40 mg, Subcutaneous, DAILY  aspirin EC, 81 mg, Oral, DAILY  budesonide-formoterol, 2 Puff, Inhalation, BID  busPIRone, 10 mg, Oral, BID  FLUoxetine, 40 mg, Oral, DAILY  furosemide, 80 mg, Oral, DAILY  levothyroxine, 75 mcg, Oral, AM ES  mycophenolate, 1,000 mg, Oral, BID  potassium chloride SA, 20 mEq, Oral, BID  famotidine, 20 mg, Oral, QAM  sodium bicarbonate, 650 mg, Oral, BID  tiotropium, 1 Cap, Inhalation, DAILY  ziprasidone, 80 mg, Oral, BID  senna-docusate, 2 Tab, Oral, BID  insulin regular, 1-6 Units, Subcutaneous, 4X/DAY ACHS         Medications were reviewed today.     Imaging  DX-LUMBAR PUNCTURE FOR DIAGNOSIS   Final Result      1.  Fluoroscopic-guided lumbar puncture as described above.   2.  Opening pressure of 25 mm of water.      CT-HEAD W/O   Final Result      No acute intracranial abnormality.      DX-HIP-UNILATERAL-WITH PELVIS-1 VIEW RIGHT   Final Result      No acute osseous abnormality.      CT-HEAD W/O   Final Result         1.  No acute intracranial abnormality.   2.  Stable partial opacification of left mastoid air cells, consider effusion or component of mastoiditis as clinically appropriate      DX-CHEST-PORTABLE (1 VIEW)   Final Result      No evidence of acute cardiopulmonary process.

## 2019-12-27 PROBLEM — N39.490 OVERFLOW INCONTINENCE OF URINE: Status: ACTIVE | Noted: 2019-12-27

## 2019-12-27 PROBLEM — R31.0 FRANK HEMATURIA: Status: ACTIVE | Noted: 2019-12-27

## 2019-12-27 PROBLEM — R35.0 INCREASED FREQUENCY OF URINATION: Status: ACTIVE | Noted: 2019-12-27

## 2019-12-27 PROBLEM — R31.29 MICROSCOPIC HEMATURIA: Status: ACTIVE | Noted: 2019-06-13

## 2019-12-27 PROBLEM — N39.46 MIXED STRESS AND URGE URINARY INCONTINENCE: Status: ACTIVE | Noted: 2019-12-27

## 2019-12-27 PROBLEM — N39.41 URGE INCONTINENCE OF URINE: Status: ACTIVE | Noted: 2019-12-27

## 2019-12-27 PROBLEM — N39.3 STRESS INCONTINENCE: Status: ACTIVE | Noted: 2019-12-27

## 2019-12-27 LAB
BACTERIA CSF CULT: NORMAL
C GATTII+NEOFOR DNA CSF QL NAA+NON-PROBE: NOT DETECTED
CMV DNA CSF QL NAA+NON-PROBE: NOT DETECTED
E COLI K1 DNA CSF QL NAA+NON-PROBE: NOT DETECTED
EV RNA CSF QL NAA+NON-PROBE: NOT DETECTED
GLUCOSE BLD-MCNC: 118 MG/DL (ref 65–99)
GLUCOSE BLD-MCNC: 119 MG/DL (ref 65–99)
GLUCOSE BLD-MCNC: 139 MG/DL (ref 65–99)
GLUCOSE BLD-MCNC: 84 MG/DL (ref 65–99)
GP B STREP DNA CSF QL NAA+NON-PROBE: NOT DETECTED
GRAM STN SPEC: NORMAL
HAEM INFLU DNA CSF QL NAA+NON-PROBE: NOT DETECTED
HHV6 DNA CSF QL NAA+NON-PROBE: NOT DETECTED
HSV1 DNA CSF QL NAA+NON-PROBE: NOT DETECTED
HSV2 DNA CSF QL NAA+NON-PROBE: NOT DETECTED
L MONOCYTOG DNA CSF QL NAA+NON-PROBE: NOT DETECTED
N MEN DNA CSF QL NAA+NON-PROBE: NOT DETECTED
PARECHOVIRUS A RNA CSF QL NAA+NON-PROBE: NOT DETECTED
S PNEUM DNA CSF QL NAA+NON-PROBE: NOT DETECTED
SIGNIFICANT IND 70042: NORMAL
SITE SITE: NORMAL
SOURCE SOURCE: NORMAL
VZV DNA CSF QL NAA+NON-PROBE: NOT DETECTED

## 2019-12-27 PROCEDURE — 99232 SBSQ HOSP IP/OBS MODERATE 35: CPT | Performed by: STUDENT IN AN ORGANIZED HEALTH CARE EDUCATION/TRAINING PROGRAM

## 2019-12-27 PROCEDURE — A9270 NON-COVERED ITEM OR SERVICE: HCPCS | Performed by: INTERNAL MEDICINE

## 2019-12-27 PROCEDURE — 82962 GLUCOSE BLOOD TEST: CPT

## 2019-12-27 PROCEDURE — 770006 HCHG ROOM/CARE - MED/SURG/GYN SEMI*

## 2019-12-27 PROCEDURE — 97530 THERAPEUTIC ACTIVITIES: CPT

## 2019-12-27 PROCEDURE — 700102 HCHG RX REV CODE 250 W/ 637 OVERRIDE(OP): Performed by: INTERNAL MEDICINE

## 2019-12-27 PROCEDURE — A9270 NON-COVERED ITEM OR SERVICE: HCPCS | Performed by: HOSPITALIST

## 2019-12-27 PROCEDURE — 700102 HCHG RX REV CODE 250 W/ 637 OVERRIDE(OP): Performed by: HOSPITALIST

## 2019-12-27 PROCEDURE — 700111 HCHG RX REV CODE 636 W/ 250 OVERRIDE (IP): Performed by: INTERNAL MEDICINE

## 2019-12-27 PROCEDURE — A9270 NON-COVERED ITEM OR SERVICE: HCPCS | Performed by: PSYCHIATRY & NEUROLOGY

## 2019-12-27 PROCEDURE — 700102 HCHG RX REV CODE 250 W/ 637 OVERRIDE(OP): Performed by: PSYCHIATRY & NEUROLOGY

## 2019-12-27 PROCEDURE — 97116 GAIT TRAINING THERAPY: CPT

## 2019-12-27 PROCEDURE — 700111 HCHG RX REV CODE 636 W/ 250 OVERRIDE (IP): Performed by: NURSE PRACTITIONER

## 2019-12-27 PROCEDURE — 99232 SBSQ HOSP IP/OBS MODERATE 35: CPT | Performed by: PSYCHIATRY & NEUROLOGY

## 2019-12-27 RX ADMIN — PREDNISONE 10 MG: 10 TABLET ORAL at 04:35

## 2019-12-27 RX ADMIN — SENNOSIDES AND DOCUSATE SODIUM 2 TABLET: 8.6; 5 TABLET ORAL at 17:36

## 2019-12-27 RX ADMIN — FUROSEMIDE 80 MG: 40 TABLET ORAL at 04:35

## 2019-12-27 RX ADMIN — FLUOXETINE HYDROCHLORIDE 40 MG: 20 CAPSULE ORAL at 04:35

## 2019-12-27 RX ADMIN — OXCARBAZEPINE 150 MG: 150 TABLET, FILM COATED ORAL at 17:38

## 2019-12-27 RX ADMIN — BUSPIRONE HYDROCHLORIDE 10 MG: 10 TABLET ORAL at 17:36

## 2019-12-27 RX ADMIN — ZIPRASIDONE HYDROCHLORIDE 80 MG: 80 CAPSULE ORAL at 04:34

## 2019-12-27 RX ADMIN — GABAPENTIN 200 MG: 100 CAPSULE ORAL at 07:57

## 2019-12-27 RX ADMIN — MODAFINIL 100 MG: 100 TABLET ORAL at 04:34

## 2019-12-27 RX ADMIN — PREGABALIN 300 MG: 100 CAPSULE ORAL at 17:37

## 2019-12-27 RX ADMIN — BUDESONIDE AND FORMOTEROL FUMARATE DIHYDRATE 2 PUFF: 160; 4.5 AEROSOL RESPIRATORY (INHALATION) at 17:35

## 2019-12-27 RX ADMIN — ALBUTEROL SULFATE 2 PUFF: 90 AEROSOL, METERED RESPIRATORY (INHALATION) at 04:36

## 2019-12-27 RX ADMIN — ACETAMINOPHEN 650 MG: 325 TABLET, FILM COATED ORAL at 05:08

## 2019-12-27 RX ADMIN — ZIPRASIDONE HYDROCHLORIDE 80 MG: 80 CAPSULE ORAL at 17:39

## 2019-12-27 RX ADMIN — FAMOTIDINE 20 MG: 20 TABLET ORAL at 04:35

## 2019-12-27 RX ADMIN — ACETAZOLAMIDE 500 MG: 500 CAPSULE, EXTENDED RELEASE ORAL at 17:36

## 2019-12-27 RX ADMIN — SODIUM BICARBONATE 650 MG: 650 TABLET ORAL at 04:34

## 2019-12-27 RX ADMIN — GABAPENTIN 200 MG: 100 CAPSULE ORAL at 21:24

## 2019-12-27 RX ADMIN — ENOXAPARIN SODIUM 40 MG: 100 INJECTION SUBCUTANEOUS at 04:35

## 2019-12-27 RX ADMIN — ACETAMINOPHEN 650 MG: 325 TABLET, FILM COATED ORAL at 11:41

## 2019-12-27 RX ADMIN — TIOTROPIUM BROMIDE 1 CAPSULE: 18 CAPSULE ORAL; RESPIRATORY (INHALATION) at 04:36

## 2019-12-27 RX ADMIN — BUDESONIDE AND FORMOTEROL FUMARATE DIHYDRATE 2 PUFF: 160; 4.5 AEROSOL RESPIRATORY (INHALATION) at 04:36

## 2019-12-27 RX ADMIN — ACETAMINOPHEN 650 MG: 325 TABLET, FILM COATED ORAL at 21:24

## 2019-12-27 RX ADMIN — POTASSIUM CHLORIDE 20 MEQ: 20 TABLET, EXTENDED RELEASE ORAL at 04:35

## 2019-12-27 RX ADMIN — ASPIRIN 81 MG: 81 TABLET, COATED ORAL at 04:35

## 2019-12-27 RX ADMIN — POTASSIUM CHLORIDE 20 MEQ: 20 TABLET, EXTENDED RELEASE ORAL at 17:37

## 2019-12-27 RX ADMIN — TRAZODONE HYDROCHLORIDE 50 MG: 100 TABLET ORAL at 17:36

## 2019-12-27 RX ADMIN — LEVOTHYROXINE SODIUM 75 MCG: 75 TABLET ORAL at 04:35

## 2019-12-27 RX ADMIN — OXCARBAZEPINE 150 MG: 150 TABLET, FILM COATED ORAL at 04:34

## 2019-12-27 RX ADMIN — MYCOPHENOLATE MOFETIL 1000 MG: 250 CAPSULE ORAL at 05:38

## 2019-12-27 RX ADMIN — IBUPROFEN 800 MG: 800 TABLET, FILM COATED ORAL at 08:00

## 2019-12-27 RX ADMIN — MECLIZINE HYDROCHLORIDE 12.5 MG: 25 TABLET ORAL at 07:57

## 2019-12-27 RX ADMIN — PREGABALIN 300 MG: 100 CAPSULE ORAL at 04:34

## 2019-12-27 RX ADMIN — SENNOSIDES AND DOCUSATE SODIUM 2 TABLET: 8.6; 5 TABLET ORAL at 04:34

## 2019-12-27 RX ADMIN — GABAPENTIN 200 MG: 100 CAPSULE ORAL at 17:37

## 2019-12-27 RX ADMIN — BUSPIRONE HYDROCHLORIDE 10 MG: 10 TABLET ORAL at 04:48

## 2019-12-27 RX ADMIN — GABAPENTIN 200 MG: 100 CAPSULE ORAL at 11:41

## 2019-12-27 RX ADMIN — MYCOPHENOLATE MOFETIL 1000 MG: 250 CAPSULE ORAL at 21:24

## 2019-12-27 ASSESSMENT — ENCOUNTER SYMPTOMS
NERVOUS/ANXIOUS: 0
HEADACHES: 1
WEAKNESS: 1
TINGLING: 1
DEPRESSION: 0
FEVER: 0

## 2019-12-27 ASSESSMENT — COGNITIVE AND FUNCTIONAL STATUS - GENERAL
CLIMB 3 TO 5 STEPS WITH RAILING: A LOT
MOVING FROM LYING ON BACK TO SITTING ON SIDE OF FLAT BED: A LITTLE
STANDING UP FROM CHAIR USING ARMS: A LITTLE
SUGGESTED CMS G CODE MODIFIER MOBILITY: CK
WALKING IN HOSPITAL ROOM: A LITTLE
MOBILITY SCORE: 19

## 2019-12-27 ASSESSMENT — GAIT ASSESSMENTS
DEVIATION: BRADYKINETIC;SHUFFLED GAIT;OTHER (COMMENT)
DISTANCE (FEET): 20
GAIT LEVEL OF ASSIST: MINIMAL ASSIST
ASSISTIVE DEVICE: FRONT WHEEL WALKER

## 2019-12-27 NOTE — THERAPY
"Physical Therapy Treatment completed.   Bed Mobility:  Supine to Sit: Supervised  Transfers: Sit to Stand: Supervised  Gait: Level Of Assist: Minimal Assist with Front-Wheel Walker       Plan of Care: Will benefit from Physical Therapy 3 times per week  Discharge Recommendations: Equipment: Will Continue to Assess for Equipment Needs.     See \"Rehab Therapy-Acute\" Patient Summary Report for complete documentation.     Pt continues to progress w/ therapy. Pt is able to increase her tolerance to mobility. Pt able to ambulate 20 feet w/ the FWW. Pt has very heavy foot placement and states it's d/t her legs feeling heavy. Pt w/ a steady gait pattern when she is distracted. As soon as she focuses on how she is walking or sees someone she begins to get tremulous and lose balance laterally. Pt able to self correct more w/ lateral LOB. Pt w/ very good strength at the EOB for LE exercises. She was able to perform slow eccentrically controlled movements but has a hard time transferring it to functional mobility. Pt less upset when discussing DC home and that she would be able to go home physically at a w/c level however is truly concerned about needing 24/7 caregivers and that is why she needs to go to post acute therapy.  "

## 2019-12-27 NOTE — PROGRESS NOTES
[facility] Medicine Daily Progress Note    Date of Service: 12/26/2019   Hospital Day: 12 Pat. Class: Inpatient     Chief Complaint   Patient presents with   • Syncope     found down by mother, states that she last saw her 30min prior.    • Dizziness    Interval Problem Update  Patient was seen and evaluated today for Syncope  Disposition: Remain IP   Hospital Course     ER Course  Patient's mother arrived home and found the patient down on the floor.  The patient had no memory of the event.  The mother did not witness any seizure activity.  Maximal amount of downtime 30 minutes.  The patient not had a fever chills or cough.  No complaints of chest pain shortness of breath or abdominal pain.  She has not had any complaints of headache neck stiffness or photophobia.  No incontinence occurred.  No other somatic    Admission Day Course  29 y.o. female who presented 12/13/2019 with altered level consciousness.  At this point in time, patient is mostly nonverbal, information obtained from her grandmother who was the one who found her.  She states whenever she left she was sleeping but just prior to this had been normal.  She states this morning she had some shortness of breath, she does have a history of asthma.  She did use breathing treatments and a home nurse helped with her breathing and it did seem to normalize.  Her grandmother left, was gone around 90 minutes, upon returning home she found her down, unconscious.  She did call 911.  She did attempt to wake up her granddaughter for quite some time and eventually she did wake up but seemed very groggy and confused.  She states she did just finish her antibiotics for her UTI/bronchitis, otherwise has been taking her usual home medications.  She has not been missing any medications nor taking extra of her medications.  I did discuss the case including labs and imaging with the ER physician.  12/14 - This morning the patient reports 8/10 frontal headache, unsteadiness,  "new blurry vision, persistent dizziness, bilateral lower extremity neuropathy, not feeling well, nausea, constipation and not being ready for discharge home. She denies shortness of breath, chest pain, dysuria. She ambulated to the bathroom with nursing supervision without incident  12/15 - Patient is complaining of having dizziness, she feels dehydrated and just overall not feeling well.  Denies any fevers or chills.  Is complaining of lower extremity spasms as well.  12/16 - Patient is still complaining of mild nausea, having back spasms which is chronic, 7/10 in severity at times. Feels like she needs more time to feel better. Denies fevers/chills, denies dysuria or diarrhea.  12/17. ON chart review: History of cellulitis in the breast. 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 Immunocompromised, on prednisone and Cellcept for optic neuritis and \"poor immune system\"; assumably she follows Dr. Newton, Neuro-Ophthalmology. History of atrial fibrillation and cardiomyopathy? On Lasix, aspirin and ivadrabine. July 2019 echo: Normal left ventricular systolic function..Left ventricular ejection fraction is visually estimated to be 65%.. Normal left ventricular wall thickness. Normal left atrial size..Trace mitral regurgitation. Structurally normal aortic valve without significant stenosis or Regurgitation. Estimated right ventricular systolic pressure  is 30 mmHg. Trace tricuspid regurgitation.. Normal right ventricular size. Also chronic pain and neuropathy. Patient fell per nursing and now mentions she has intractable R hip pain and can't walk. Ordered Hip XR and that turned out to be normal, no arthritis. Later she mentioned she choked on a piece of meat and is short of breath, She however speaks full sentences, not hypoxic, not wheezing. . She is on several pain medications and anti-inflammatories; sees Pain Clinic and Dr. Cabral, Neuroophthalmology. She mentions she is on low dose steroids prescribed to her by " "Dr. Wilkins.  12/18. Patient \"fell to knees\" again. Discussed with Dr. Sabillon in depth.  12/19. Unchanged nonspecific complaints of pain and weakness. I called dr. Chowdary, who is following her for optic neuritis. I then called Dr. Rincon, and Cande Jennings, Neurology  12/20. No issues or complaints currently. Grandmother at bedside with questions.. SW/Palliative asking about competency eval as patient desires to be DNR.. For now I tony er Speech for cognitive eval. Psych has been involved so may need formal neurocognitive testing for competency  12/21. No new issues or concerns when I saw her. Later nurse reports she is having \"vision problems\" similar to optic neuritis flare. Of note, she is on high dose prednisone.   12/22. She feels she has \"eye sypmtoms\" and paresthesias. Her grandmother worried about her eating that she is not getting enough because she needs solid foods. She is on a pureed diet.   12/23 - Patient is still complaining of mild nausea, having back spasms which is chronic, 7/10 in severity at times. Feels like she needs more time to feel better. Denies fevers/chills, denies dysuria or diarrhea  12/24 - Patient waiting to be transferred to nursing home to have someone take care of her.. no new developments. Patient consumes excessive amount of diet dr aguilar.  12/25 - Awaiting placement.  Patient awaiting rehab.  Neurology concerned this is very much a poly-pharmacy problem, to which I am in complete agreement.  Will gently address concerns about this with the patient tomorrow.  12/26 - Patient very amenable to discussion today about how the prescribing cascade has negatively affected her life.  Awaiting placement, but ultimately she needs a taper plan.  A skilled nursing facility with a physician willing to start it may be the most appropriate place to do this.    Code Status: DNAR/DNI  Consultants/Specialty:  Psych, Neurology, Palliative Care Procedures During Hospitalization:  None " "  Lines, Drains, Airways, Wounds  Lines:  Drains:  Airway:  Wounds: VTE prophylaxis: Lovenox        Diet Order Diabetic    Assessment/Plan  * Altered level of consciousness- (present on admission)  Assessment & Plan  Highly suspect this is secondary to polypharmacy, patient is on a lot of sedating medications. She does have a history of accidental overdose in the past but denies this. No seizure activity noted, no evidence of rhabdomyolysis. Currently mentation has resolved, is AAOx4 past 2 days. He had a long discussion in terms of decreasing her sedating medications.  12/17. No changes in her pain regimen and sedatives. Follow up to her Pain Clinic.  12/18. Discussed with Dr. Sabillon. Not very clear diagnoses but managed for functional transverse myelitis, possible optic neuritis, seen by Dr. Newton. Spine stimulator limits further evaluation for other causes of enceiphalopathy/weakness/falls such as MRIs. Taper of gabapentin and transition to Trileptal. Modenalfil also started. Monitor her symptoms of weakness causing her to fall  12/19. May have a functional component but Dr. Chowdary feels there could be neurologic disease. He recommended formal neurology consult to r/o transverse myelitis or CNS lesions and see if it is acceptable to get a CT myelogram since MRI is contraindicated due to her gastric pacer/stimulator, Discussed with Neurology. Has had CT myelogram in the past per their records and seen Dr. Fox. Has been nondiagnostic. Currently no other possible myelopathy diagnosis. Symptomatology also inconsistent as she has reflexes. She has been worked up for myopathies; on Dr. Fox's notes in the past, muscle biopsy was nondiagnostic.  In the past IV Solumedrol was given and per Dr. Fox, shpowed some benefit for a diagnosis of \"paralysis\" and \"lower motor neuron etiology\". 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 This was 2 years ago however and recently she has been experiencing weakness and falls again. Currently agree that no need for CT myelogram right now, will evaluate if high dose steroids will be of any benefit otherwise she will have to follow-up with Dr. Fox. Ordered 3d course of Solu-medrol to see if there is any benefit, as Neurology recommended no harm to try what worked in the past.  12/20. Discussed plan with grandmother and patient. Will do the steroid trial. Poor IV access so switch to prednisone 60x 1 followe dby pred 10 as per Dr. Chowdary's outpatient regimen for optic neuritis NOS. 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 Inpatient Neurology has no further comment and recommnd following Dr. Fox, neurology for further management. Meanwhile grandmother admits she cannot care for her 24/7 so she is awaiting SNF/rehab/ SW working on it.  12/22. Ordered supplements. Dietary and Speech consults  12/23. Consulted Dr. Ramires, neurology for second opinion. 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 She has a history of back surgery, obesity and she feels she needs anesthesia (does not want bedside LP) therefore ordered head CT followed by IR guided LP. Ordered encephalitis panel. Left message to Dr. Chowdary who isn't in the office today/vacation.  12/24 - Lumbar puncture today.  Patient's mentation is quite improved.  States she needs to have her grandmother be her caregiver.  Her grandmother is struggling to take care of her grandfather with dementia.  Patient wants to go to a rehab/nursing facility.  Agree with potential for this to be polypharmacy.  Neuro pending  12/25 - LP complete - Opening pressures of 25, neuro started acetazolamide. Symptomatically, ALOC is considered resolved.  12/26 - Resolved, although given patient's extent of polypharmacy from appropriate use  of prescribed medications (prescribing cascade) I'm not even sure she knows what an unaltered level of consciousness would be like.    Advanced care planning/counseling discussion  Assessment & Plan  12/20. Patient has indicated to Palliative that she wants to be DNR. Currently she does not have a diagnosis to say that she is terminal. She also has been followed by Psychiatry and has had altered mentation in the past. I explained to Palliative and SW that a formal neuro cognitive eval needs to be done for competency as I can only assess capacity. SLP to do cognitive eval and will let me know if formal cognitive eval needed, and if so I will consult Psychology.  12/21. Speech for cognitive eval, and if they recommend formal testing for competency will have Psychology evaluate., By definition: Competency is a global assessment and legal determination made by a  in court. Capacity is a functional assessment and a clinical determination about a specific decision that can be made by any clinician familiar with a patient’s case. Hospitalists frequently encounter situations in which a patient’s capacity is called into question; in most cases, this is a determination a hospitalist can make independent of consultants.  12/23. Awaiting SLP for cognitive eval. Reconsult Psych to assess if there is any behavioral component to affect her competency. Psychology consulted; depends on SLP and Psychiatry. If cognitive eval normal and no behaviporal issue/depression or suicidality from the aove evaluating services then DNR/DNI POLST and documentation for competency can be signed.  12/24-12/26 - Stable for now.  Patient really struggling more with self-defeating and choices of continuing to be victim to her past hurts.  Palliative care states patient desires to be a DNR.      Polypharmacy  Assessment & Plan  12/26 - This is polypharmacy and a prescribing cascade.  Home med-list is an absurdity.  Home med list does not include her  "\"medical marijuana.\" Patient takes a hand full of pills two time daily.  If we saw a person do this and didn't know them, we would rush them to the hospital as an intentional overdose with medications like this at this dose and number, yet she does this twice a day as directed.  Sodium Bicarb - taken for \"acidic blood\" she has been told.   Aside from the fact that this medication will fundamentally alter the pharmacodynamics of all the other medications she is taking one at a time, it will alter all of them.  Nevermind the upset stomach she'll get from taking this many medications while chasing them all with 40oz of Dr. Pepper everyday.  Lyrica and Gabapentin at her doses is simply not indicated.  Nevermind that both of these medications are enhancers to the effects of other medications with neuroactive mechanisms with street values.  Fluoxetine, buspar, geodon, and trazadone - four medications for these conditions is not supported by any evidence whatsoever to control these conditions.  Geodon is a medication that's used for agitation/combative behaviors in the acute care setting and here we have her on it daily, at large dose, and she comes in with altered level of consciousness?  That's what this medication does.  It sedates.  Add Lyrica and Gabapentin to it.  Of course she's sluggish and now on thyroid medication, which is unlikely to be absorbed properly anyway.  Her gut edema from her profound fluid overload/total body interstitial edema will prevent absorption of these medications predictably anyway as their pharmacodynamics are tested on \"healthy\" people.  Certainly not on people taking bicarb two times daily. Benadryl and ranitidine will also alter how the stomach/duodenum function in pharmacodynamics/kiinetics individually, and she's on both. Patient desperately needs to have her PCP draw the line and start discontinuing these medications deliberately and methodically.    predniSONE (DELTASONE) 10 MG Tab " Take 10 mg by mouth every day. Dmitriy Harper M.D. Not Ordered   budesonide-formoterol (SYMBICORT) 160-4.5 MCG/ACT Aerosol Inhale 2 Puffs by mouth 2 Times a Day. John Albrecht D.O. Reordered   fluoxetine (PROZAC) 40 MG capsule Take 40 mg by mouth every day. John Albrecht D.O. Reordered   aspirin EC (ECOTRIN) 81 MG Tablet Delayed Response Take 81 mg by mouth every day. John Albrecht D.O. Reordered   busPIRone (BUSPAR) 10 MG Tab tablet Take 10 mg by mouth 2 times a day. John Albrecht D.O. Reordered   levothyroxine (SYNTHROID) 75 MCG Tab Take 75 mcg by mouth Every morning on an empty stomach. John Albrecht D.O. Reordered   pregabalin (LYRICA) 300 MG capsule Take 300 mg by mouth 2 Times a Day. John Albrecht D.O. Not Ordered   montelukast (SINGULAIR) 10 MG Tab Take 10 mg by mouth every day. John Albrecht D.O. Not Ordered   ondansetron (ZOFRAN ODT) 4 MG TABLET DISPERSIBLE Take 4 mg by mouth every 6 hours as needed for Nausea. John Albrecht D.O. Not Ordered   potassium chloride SA (KDUR) 20 MEQ Tab CR Take 20 mEq by mouth 2 times a day. John Albrecht D.O. Reordered   ziprasidone (GEODON) 80 MG Cap Take 80 mg by mouth 2 Times a Day. John Albrecht D.O. Reordered   traZODone (DESYREL) 100 MG Tab Take 100 mg by mouth every evening. John Albrecht D.O. Not Ordered   ranitidine (ZANTAC) 300 MG tablet Take 300 mg by mouth every morning. John Albrecht D.O. Reordered   diphenhydrAMINE-APAP, sleep, (TYLENOL PM EXTRA STRENGTH)  MG Tab Take 2 Tabs by mouth every evening. John Albrecht D.O. Not Ordered   Diclofenac Sodium (VOLTAREN) 1 % Gel Apply 1 Application to skin as directed 4 times a day as needed (on wrists, back, ankles, and feet). John Albrecht D.O. Not Ordered   tiotropium (SPIRIVA) 18 MCG Cap Inhale 1 Cap by mouth every day. John Albrecht D.O. Reordered   folic acid (FOLVITE) 800 MCG tablet Take 800 mg by mouth every day. John Albrecht D.O. Not Ordered   Ivabradine HCl  (CORLANOR) 7.5 MG Tab Take 7.5 mg by mouth 2 Times a Day. John Albrecht D.O. Not Ordered   ipratropium-albuterol (DUONEB) 0.5-2.5 (3) MG/3ML nebulizer solution 3 mL by Nebulization route every 6 hours as needed for Shortness of Breath. John Albrecht D.O. Not Ordered   etonogestrel (NEXPLANON) 68 MG Implant implant Inject 1 Each as instructed Once. John Albrecth D.O. Not Ordered   mycophenolate (CELLCEPT) 500 MG tablet Take 1,000 mg by mouth 2 times a day. John Albrecht D.O. Reordered   gabapentin (NEURONTIN) 300 MG Cap Take 300 mg by mouth 4 times a day. John Albrecht D.O. Not Ordered   Dulaglutide (TRULICITY) 1.5 MG/0.5ML Solution Pen-injector Inject 1.5 mg as instructed every Friday. John Albrecht D.O. Not Ordered   sodium bicarbonate 325 MG Tab Take 650 mg by mouth 2 Times a Day. John Albrecht D.O. Reordered   albuterol 108 (90 Base) MCG/ACT Aero Soln inhalation aerosol Inhale 2 Puffs by mouth every 6 hours as needed for Shortness of Breath. John Albrecht D.O. Not Ordered   Melatonin 5 MG Tab Take 10 mg by mouth every evening. John Albrecht D.O. Not Ordered   furosemide (LASIX) 80 MG Tab Take 80 mg by mouth every day. John Albrecht D.O. Reordered   methocarbamol (ROBAXIN) 750 MG Tab Take 750 mg by mouth every 4 hours. John Albrecht D.O. Not Ordered         Morbidly obese (HCC)- (present on admission)  Assessment & Plan  12/26 - Patient consumes a lot of soda.  Possibly up to a gallon/day of total fluid intake.  Known to have consumed at least 80oz of diet dr. Greco in a single day while lying in bed with shaky legs.  Symptoms that will get worse as she weakens and further debilitates while laying in bed.  Patient already on lasix, discussion with patient regarding fluid volume intake, she fully agrees that she feels like she's bogged down by water instead of something similar to the behavior or butter. While she does have a bit extra fluff, likely from the sugar or artificial  "sweateners in the diet sodas, her \"morbid obesity\" is multi-factorial and won't be fixed with \"diet and exercise.\"  Patient is already on lasix, discussed with her to limit her fluid intake for a couple days here and see what happens.    Body mass index is 46.26 kg/m².    History of optic neuritis- (present on admission)  Assessment & Plan  12/24-12/26 - stable  - Previous Plans by Other Providers -  Per her report and she follows Dr. Wilkins.  12/22. Call Dr. Yoder tomorrow.    Mild intermittent asthma without complication- (present on admission)  Assessment & Plan  12/26-12/26 - Patient on 5 respiratory medications for a problem very likely exacerbated by fluid overload from daily soda intake.  Singulair also may be a neuro-active medication as well adding one more neuroactive medication to the list.  12/25 - Patient's 6-pack of 20oz dr aguilar is gone... might be a daily overhydration total body interstitial edema from severe soda intake component to her asthma...  12/24 - stable. Patient had a 6 pack of 20oz bottles of diet dr pepper on the table.  - Previous Plans by Other Providers -  Controlled on Symbicort, montelukast  Continue RT protocol, duo nebs, Pep therapy if warranted, and incentive spirometry.       Mastoiditis  Assessment & Plan  12/24-12/26 - stable  - Previous Plans by Other Providers -  CT head shows Stable partial opacification of left mastoid air cells, consider effusion or component of mastoiditis as clinically appropriate  Resume augmentin.    Immunocompromised (HCC)- (present on admission)  Assessment & Plan  12/24-12/26 - stable  - Previous Plans by Other Providers -  On steroids and immunosuppressants for optic neuritis followed byu Dr. Newton, NeuroOphthalmology    Acquired hypothyroidism- (present on admission)  Assessment & Plan  12/24-12/26 - stable  - Previous Plans by Other Providers -  Resume home dose of Synthroid  Last month her TSH was within normal limits    Type 2 " diabetes mellitus with hyperglycemia, without long-term current use of insulin (HCC)- (present on admission)  Assessment & Plan  12/24-12/26 -stable  - Previous Plans by Other Providers -  Continue Insulin-sliding scale, accu-checks and hypoglycemia protocol.  Continue with Consistent Carbohydrate diet   Hold home dose of trulicity   Results from last 7 days   Lab Units 12/26/19  2027 12/26/19  1632 12/26/19  1155 12/26/19  0743 12/25/19  2108 12/25/19  1804 12/25/19  1208 12/25/19  0813   ACCU CHECK GLUCOSE 788 mg/dL 122* 104* 107* 139* 115* 132* 121* 165*         Depression- (present on admission)  Assessment & Plan  12/24-12/26 - stable  - Previous Plans by Other Providers -  Resume home dose of Prozac    GERD (gastroesophageal reflux disease)- (present on admission)  Assessment & Plan  Continue with Pepcid    Borderline personality disorder in adult (HCC)- (present on admission)  Assessment & Plan  12/24-12/26 - stable  - Previous Plans by Other Providers -  Continue with home dose of Prozac, Geodon and trazodone         Laboratory  Results from last 7 days   Lab Units 12/24/19  0505 12/23/19  0352   WBC 1501 K/uL 7.4 7.4   HGB 1503 g/dL 13.7 13.4   HCT 1504 % 42.8 40.9   PLATELET COUNT 1518 K/uL 150* 145*    Results from last 7 days   Lab Units 12/24/19  0505 12/23/19  0352   SODIUM 101 mmol/L 139 139   POTASSIUM 102 mmol/L 3.7 3.4*   CHLORIDE 103 mmol/L 107 106   CO2 104 mmol/L 20 25   ANION GAP ANION  12.0* 8.0    mg/dL 17 17   CREATININE 109 mg/dL 0.87 0.75   CALCIUM 105 mg/dL 9.0 9.0   GLUCOSE 112 mg/dL 101* 89   IF ALYSSA AMER 847879 mL/min/1.73 m 2 >60 >60   IF NON AFRICAN AMER 109GFRB mL/min/1.73 m 2 >60 >60            Vital Signs Reviewed and Stable       Review of Systems  Review of Systems   Constitutional: Negative for fever and malaise/fatigue.   Skin: Negative for itching and rash.   Neurological: Positive for tingling, weakness and headaches.   Psychiatric/Behavioral: Negative for  depression. The patient is not nervous/anxious.     Physical Exam  Physical Exam   Constitutional: She is oriented to person, place, and time. No distress.   Profoundly watery body habitus   Musculoskeletal:         General: Edema present. No deformity.   Neurological: She is alert and oriented to person, place, and time.   Skin: Skin is warm and dry. She is not diaphoretic.   Psychiatric:   Odd affect. Wants her grandmother to take care of her while she has to take care of her grand father with dementia.  She's 29 years old and expects her grandmother to be her caregiver.. not the other way around...   Nursing note and vitals reviewed.       Medications  acetaZOLAMIDE SR, 500 mg, Oral, DAILY  predniSONE, 10 mg, Oral, DAILY  OXcarbazepine, 150 mg, Oral, BID  gabapentin, 200 mg, Oral, 4X/DAY  traZODone, 50 mg, Oral, Q EVENING  modafinil, 100 mg, Oral, QAM  pregabalin, 300 mg, Oral, BID  Ivabradine HCl, 7.5 mg, Oral, BID  enoxaparin (LOVENOX) injection, 40 mg, Subcutaneous, DAILY  aspirin EC, 81 mg, Oral, DAILY  budesonide-formoterol, 2 Puff, Inhalation, BID  busPIRone, 10 mg, Oral, BID  FLUoxetine, 40 mg, Oral, DAILY  furosemide, 80 mg, Oral, DAILY  levothyroxine, 75 mcg, Oral, AM ES  mycophenolate, 1,000 mg, Oral, BID  potassium chloride SA, 20 mEq, Oral, BID  famotidine, 20 mg, Oral, QAM  tiotropium, 1 Cap, Inhalation, DAILY  ziprasidone, 80 mg, Oral, BID  senna-docusate, 2 Tab, Oral, BID  insulin regular, 1-6 Units, Subcutaneous, 4X/DAY ACHS         Medications were reviewed today.     Imaging  DX-LUMBAR PUNCTURE FOR DIAGNOSIS   Final Result      1.  Fluoroscopic-guided lumbar puncture as described above.   2.  Opening pressure of 25 mm of water.      CT-HEAD W/O   Final Result      No acute intracranial abnormality.      DX-HIP-UNILATERAL-WITH PELVIS-1 VIEW RIGHT   Final Result      No acute osseous abnormality.      CT-HEAD W/O   Final Result         1.  No acute intracranial abnormality.   2.  Stable partial  opacification of left mastoid air cells, consider effusion or component of mastoiditis as clinically appropriate      DX-CHEST-PORTABLE (1 VIEW)   Final Result      No evidence of acute cardiopulmonary process.

## 2019-12-27 NOTE — CARE PLAN
Problem: Safety  Goal: Will remain free from injury  Outcome: PROGRESSING AS EXPECTED  Goal: Will remain free from falls  Outcome: PROGRESSING AS EXPECTED     Problem: Discharge Barriers/Planning  Goal: Patient's continuum of care needs will be met  Outcome: PROGRESSING SLOWER THAN EXPECTED     Problem: Pain Management  Goal: Pain level will decrease to patient's comfort goal  Outcome: PROGRESSING SLOWER THAN EXPECTED

## 2019-12-27 NOTE — PROGRESS NOTES
[facility] Medicine Daily Progress Note    Date of Service: 12/27/2019   Hospital Day: 12 Pat. Class: Inpatient     Chief Complaint   Patient presents with   • Syncope     found down by mother, states that she last saw her 30min prior.    • Dizziness    Interval Problem Update  Patient was seen and evaluated today for Syncope  Disposition: Remain IP   Hospital Course     ER Course  Patient's mother arrived home and found the patient down on the floor.  The patient had no memory of the event.  The mother did not witness any seizure activity.  Maximal amount of downtime 30 minutes.  The patient not had a fever chills or cough.  No complaints of chest pain shortness of breath or abdominal pain.  She has not had any complaints of headache neck stiffness or photophobia.  No incontinence occurred.  No other somatic    Admission Day Course  29 y.o. female who presented 12/13/2019 with altered level consciousness.  At this point in time, patient is mostly nonverbal, information obtained from her grandmother who was the one who found her.  She states whenever she left she was sleeping but just prior to this had been normal.  She states this morning she had some shortness of breath, she does have a history of asthma.  She did use breathing treatments and a home nurse helped with her breathing and it did seem to normalize.  Her grandmother left, was gone around 90 minutes, upon returning home she found her down, unconscious.  She did call 911.  She did attempt to wake up her granddaughter for quite some time and eventually she did wake up but seemed very groggy and confused.  She states she did just finish her antibiotics for her UTI/bronchitis, otherwise has been taking her usual home medications.  She has not been missing any medications nor taking extra of her medications.  I did discuss the case including labs and imaging with the ER physician.  12/14 - This morning the patient reports 8/10 frontal headache, unsteadiness,  "new blurry vision, persistent dizziness, bilateral lower extremity neuropathy, not feeling well, nausea, constipation and not being ready for discharge home. She denies shortness of breath, chest pain, dysuria. She ambulated to the bathroom with nursing supervision without incident  12/15 - Patient is complaining of having dizziness, she feels dehydrated and just overall not feeling well.  Denies any fevers or chills.  Is complaining of lower extremity spasms as well.  12/16 - Patient is still complaining of mild nausea, having back spasms which is chronic, 7/10 in severity at times. Feels like she needs more time to feel better. Denies fevers/chills, denies dysuria or diarrhea.  12/17. ON chart review: History of cellulitis in the breast. 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 Immunocompromised, on prednisone and Cellcept for optic neuritis and \"poor immune system\"; assumably she follows Dr. Newton, Neuro-Ophthalmology. History of atrial fibrillation and cardiomyopathy? On Lasix, aspirin and ivadrabine. July 2019 echo: Normal left ventricular systolic function..Left ventricular ejection fraction is visually estimated to be 65%.. Normal left ventricular wall thickness. Normal left atrial size..Trace mitral regurgitation. Structurally normal aortic valve without significant stenosis or Regurgitation. Estimated right ventricular systolic pressure  is 30 mmHg. Trace tricuspid regurgitation.. Normal right ventricular size. Also chronic pain and neuropathy. Patient fell per nursing and now mentions she has intractable R hip pain and can't walk. Ordered Hip XR and that turned out to be normal, no arthritis. Later she mentioned she choked on a piece of meat and is short of breath, She however speaks full sentences, not hypoxic, not wheezing. . She is on several pain medications and anti-inflammatories; sees Pain Clinic and Dr. Cabral, Neuroophthalmology. She mentions she is on low dose steroids prescribed to her by " "Dr. Wilkins.  12/18. Patient \"fell to knees\" again. Discussed with Dr. Sabillon in depth.  12/19. Unchanged nonspecific complaints of pain and weakness. I called dr. Chowdary, who is following her for optic neuritis. I then called Dr. Rincon, and Cande Jennings, Neurology  12/20. No issues or complaints currently. Grandmother at bedside with questions.. SW/Palliative asking about competency eval as patient desires to be DNR.. For now I tony er Speech for cognitive eval. Psych has been involved so may need formal neurocognitive testing for competency  12/21. No new issues or concerns when I saw her. Later nurse reports she is having \"vision problems\" similar to optic neuritis flare. Of note, she is on high dose prednisone.   12/22. She feels she has \"eye sypmtoms\" and paresthesias. Her grandmother worried about her eating that she is not getting enough because she needs solid foods. She is on a pureed diet.   12/23 - Patient is still complaining of mild nausea, having back spasms which is chronic, 7/10 in severity at times. Feels like she needs more time to feel better. Denies fevers/chills, denies dysuria or diarrhea  12/24 - Patient waiting to be transferred to nursing home to have someone take care of her.. no new developments. Patient consumes excessive amount of diet dr aguilar.  12/25 - Awaiting placement.  Patient awaiting rehab.  Neurology concerned this is very much a poly-pharmacy problem, to which I am in complete agreement.  Will gently address concerns about this with the patient tomorrow.  12/26 - Patient very amenable to discussion today about how the prescribing cascade has negatively affected her life.  Awaiting placement, but ultimately she needs a taper plan.  A skilled nursing facility with a physician willing to start it may be the most appropriate place to do this.  12/27 - Caffeine withdrawal headache.  Patient expressed her gratitude to me for caring.  Patient's grandmother got rid of all " the Dr. Pepper.  Patient sitting upright in the bed today for the first time since I have been seeing her.    Code Status: DNAR/DNI  Consultants/Specialty:  Psych, Neurology, Palliative Care Procedures During Hospitalization:  None   Lines, Drains, Airways, Wounds  Lines:  Drains:  Airway:  Wounds: VTE prophylaxis: Lovenox        Diet Order Diabetic    Assessment/Plan  * Altered level of consciousness- (present on admission)  Assessment & Plan  Highly suspect this is secondary to polypharmacy, patient is on a lot of sedating medications. She does have a history of accidental overdose in the past but denies this. No seizure activity noted, no evidence of rhabdomyolysis. Currently mentation has resolved, is AAOx4 past 2 days. He had a long discussion in terms of decreasing her sedating medications.  12/17. No changes in her pain regimen and sedatives. Follow up to her Pain Clinic.  12/18. Discussed with Dr. Sabillon. Not very clear diagnoses but managed for functional transverse myelitis, possible optic neuritis, seen by Dr. Newton. Spine stimulator limits further evaluation for other causes of enceiphalopathy/weakness/falls such as MRIs. Taper of gabapentin and transition to Trileptal. Modenalfil also started. Monitor her symptoms of weakness causing her to fall  12/19. May have a functional component but Dr. Chowdary feels there could be neurologic disease. He recommended formal neurology consult to r/o transverse myelitis or CNS lesions and see if it is acceptable to get a CT myelogram since MRI is contraindicated due to her gastric pacer/stimulator, Discussed with Neurology. Has had CT myelogram in the past per their records and seen Dr. Fox. Has been nondiagnostic. Currently no other possible myelopathy diagnosis. Symptomatology also inconsistent as she has reflexes. She has been worked up for myopathies; on Dr. Fox's notes in the past, muscle biopsy was nondiagnostic.  In the past IV Solumedrol was  "given and per Dr. Fox, shpowed some benefit for a diagnosis of \"paralysis\" and \"lower motor neuron etiology\". 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 This was 2 years ago however and recently she has been experiencing weakness and falls again. Currently agree that no need for CT myelogram right now, will evaluate if high dose steroids will be of any benefit otherwise she will have to follow-up with Dr. Fox. Ordered 3d course of Solu-medrol to see if there is any benefit, as Neurology recommended no harm to try what worked in the past.  12/20. Discussed plan with grandmother and patient. Will do the steroid trial. Poor IV access so switch to prednisone 60x 1 followe dby pred 10 as per Dr. Chowdary's outpatient regimen for optic neuritis NOS. 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 Inpatient Neurology has no further comment and recommnd following Dr. Fox, neurology for further management. Meanwhile grandmother admits she cannot care for her 24/7 so she is awaiting SNF/rehab/ SW working on it.  12/22. Ordered supplements. Dietary and Speech consults  12/23. Consulted Dr. Ramires, neurology for second opinion. 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 She has a history of back surgery, obesity and she feels she needs anesthesia (does not want bedside LP) therefore ordered head CT followed by IR guided LP. Ordered encephalitis panel. Left message to Dr. Chowdary who isn't in the office today/vacation.  12/24 - Lumbar puncture today.  Patient's mentation is quite improved.  States she needs to have her grandmother be her caregiver.  Her grandmother is struggling to take care of her grandfather with dementia.  Patient wants to go to a rehab/nursing facility.  Agree with potential for this to be polypharmacy.  " Neuro pending  12/25 - LP complete - Opening pressures of 25, neuro started acetazolamide. Symptomatically, ALOC is considered resolved.  12/26-12/27 - Resolved, although given patient's extent of polypharmacy from appropriate use of prescribed medications (prescribing cascade) I'm not even sure she knows what an unaltered level of consciousness would be like.    Advanced care planning/counseling discussion  Assessment & Plan  12/20. Patient has indicated to Palliative that she wants to be DNR. Currently she does not have a diagnosis to say that she is terminal. She also has been followed by Psychiatry and has had altered mentation in the past. I explained to Palliative and SW that a formal neuro cognitive eval needs to be done for competency as I can only assess capacity. SLP to do cognitive eval and will let me know if formal cognitive eval needed, and if so I will consult Psychology.  12/21. Speech for cognitive eval, and if they recommend formal testing for competency will have Psychology evaluate., By definition: Competency is a global assessment and legal determination made by a  in court. Capacity is a functional assessment and a clinical determination about a specific decision that can be made by any clinician familiar with a patient’s case. Hospitalists frequently encounter situations in which a patient’s capacity is called into question; in most cases, this is a determination a hospitalist can make independent of consultants.  12/23. Awaiting SLP for cognitive eval. Reconsult Psych to assess if there is any behavioral component to affect her competency. Psychology consulted; depends on SLP and Psychiatry. If cognitive eval normal and no behaviporal issue/depression or suicidality from the aove evaluating services then DNR/DNI POLST and documentation for competency can be signed.  12/24-12/27 - Stable for now.  Patient really struggling more with self-defeating and choices of continuing to be victim to  "her past hurts.  Palliative care states patient desires to be a DNR.      Polypharmacy  Assessment & Plan  12/27 - While inpatient patient had modafinil, trileptal meclizine and acetazolamide added. Bicarb discontinued today.  12/26 - This is polypharmacy and a prescribing cascade.  Home med-list is an absurdity.  Home med list does not include her \"medical marijuana.\" Patient takes a hand full of pills two time daily.  If we saw a person do this and didn't know them, we would rush them to the hospital as an intentional overdose with medications like this at this dose and number, yet she does this twice a day as directed.  Sodium Bicarb - taken for \"acidic blood\" she has been told.   Aside from the fact that this medication will fundamentally alter the pharmacodynamics of all the other medications she is taking one at a time, it will alter all of them.  Nevermind the upset stomach she'll get from taking this many medications while chasing them all with 40oz of Dr. Pepper everyday.  Lyrica and Gabapentin at her doses is simply not indicated.  Nevermind that both of these medications are enhancers to the effects of other medications with neuroactive mechanisms with street values.  Fluoxetine, buspar, geodon, and trazadone - four medications for these conditions is not supported by any evidence whatsoever to control these conditions.  Geodon is a medication that's used for agitation/combative behaviors in the acute care setting and here we have her on it daily, at large dose, and she comes in with altered level of consciousness?  That's what this medication does.  It sedates.  Add Lyrica and Gabapentin to it.  Of course she's sluggish and now on thyroid medication, which is unlikely to be absorbed properly anyway.  Her gut edema from her profound fluid overload/total body interstitial edema will prevent absorption of these medications predictably anyway as their pharmacodynamics are tested on \"healthy\" people.  " Certainly not on people taking bicarb two times daily. Benadryl and ranitidine will also alter how the stomach/duodenum function in pharmacodynamics/kiinetics individually, and she's on both. Patient desperately needs to have her PCP draw the line and start discontinuing these medications deliberately and methodically.    predniSONE (DELTASONE) 10 MG Tab Take 10 mg by mouth every day. Dmitriy Harper M.D. Not Ordered   budesonide-formoterol (SYMBICORT) 160-4.5 MCG/ACT Aerosol Inhale 2 Puffs by mouth 2 Times a Day. John Albrecht D.O. Reordered   fluoxetine (PROZAC) 40 MG capsule Take 40 mg by mouth every day. John Albrecht D.O. Reordered   aspirin EC (ECOTRIN) 81 MG Tablet Delayed Response Take 81 mg by mouth every day. John Albrecht D.O. Reordered   busPIRone (BUSPAR) 10 MG Tab tablet Take 10 mg by mouth 2 times a day. John Albrecht D.O. Reordered   levothyroxine (SYNTHROID) 75 MCG Tab Take 75 mcg by mouth Every morning on an empty stomach. John Albrecht D.O. Reordered   pregabalin (LYRICA) 300 MG capsule Take 300 mg by mouth 2 Times a Day. John Albrecht D.O. Not Ordered   montelukast (SINGULAIR) 10 MG Tab Take 10 mg by mouth every day. John Albrecht D.O. Not Ordered   ondansetron (ZOFRAN ODT) 4 MG TABLET DISPERSIBLE Take 4 mg by mouth every 6 hours as needed for Nausea. John Albrecht D.O. Not Ordered   potassium chloride SA (KDUR) 20 MEQ Tab CR Take 20 mEq by mouth 2 times a day. John Albrecht D.O. Reordered   ziprasidone (GEODON) 80 MG Cap Take 80 mg by mouth 2 Times a Day. John Albrecht D.O. Reordered   traZODone (DESYREL) 100 MG Tab Take 100 mg by mouth every evening. John Albrecht D.O. Not Ordered   ranitidine (ZANTAC) 300 MG tablet Take 300 mg by mouth every morning. John Albrecht D.O. Reordered   diphenhydrAMINE-APAP, sleep, (TYLENOL PM EXTRA STRENGTH)  MG Tab Take 2 Tabs by mouth every evening. John Albrecht D.O. Not Ordered   Diclofenac Sodium (VOLTAREN) 1 %  Gel Apply 1 Application to skin as directed 4 times a day as needed (on wrists, back, ankles, and feet). John Albrecht D.O. Not Ordered   tiotropium (SPIRIVA) 18 MCG Cap Inhale 1 Cap by mouth every day. John Albrecht D.O. Reordered   folic acid (FOLVITE) 800 MCG tablet Take 800 mg by mouth every day. John Albrecht D.O. Not Ordered   Ivabradine HCl (CORLANOR) 7.5 MG Tab Take 7.5 mg by mouth 2 Times a Day. John Albrecht D.O. Not Ordered   ipratropium-albuterol (DUONEB) 0.5-2.5 (3) MG/3ML nebulizer solution 3 mL by Nebulization route every 6 hours as needed for Shortness of Breath. John Albrecht D.O. Not Ordered   etonogestrel (NEXPLANON) 68 MG Implant implant Inject 1 Each as instructed Once. John Albrecht D.O. Not Ordered   mycophenolate (CELLCEPT) 500 MG tablet Take 1,000 mg by mouth 2 times a day. John Albrecht D.O. Reordered   gabapentin (NEURONTIN) 300 MG Cap Take 300 mg by mouth 4 times a day. John Albrecht D.O. Not Ordered   Dulaglutide (TRULICITY) 1.5 MG/0.5ML Solution Pen-injector Inject 1.5 mg as instructed every Friday. John Albrecht D.O. Not Ordered   sodium bicarbonate 325 MG Tab Take 650 mg by mouth 2 Times a Day. John Albrecht D.O. Reordered   albuterol 108 (90 Base) MCG/ACT Aero Soln inhalation aerosol Inhale 2 Puffs by mouth every 6 hours as needed for Shortness of Breath. John Albrecht D.O. Not Ordered   Melatonin 5 MG Tab Take 10 mg by mouth every evening. John Albrecht D.O. Not Ordered   furosemide (LASIX) 80 MG Tab Take 80 mg by mouth every day. John Albrecht D.O. Reordered   methocarbamol (ROBAXIN) 750 MG Tab Take 750 mg by mouth every 4 hours. John Albrecht D.O. Not Ordered         Morbidly obese (HCC)- (present on admission)  Assessment & Plan  12/27 - Continued discussion about what we had talked about.  Further discussion about how the patient's prescribing cascade is also playing into this.  She hates that she has 5-6 different kinds of doctors and  "spends all of her time at doctor appointments.  Interested in discussion about beginning to taper medications in the outpatient and that it would require finding a physician willing to start this process.  Discussion that it could take 18 months in a clinic to help give her her life back  12/26 - Patient consumes a lot of soda.  Possibly up to a gallon/day of total fluid intake.  Known to have consumed at least 80oz of jennifer Greco in a single day while lying in bed with shaky legs.  Symptoms that will get worse as she weakens and further debilitates while laying in bed.  Patient already on lasix, discussion with patient regarding fluid volume intake, she fully agrees that she feels like she's bogged down by water instead of something similar to the behavior or butter. While she does have a bit extra fluff, likely from the sugar or artificial sweateners in the diet sodas, her \"morbid obesity\" is multi-factorial and won't be fixed with \"diet and exercise.\"  Patient is already on lasix, discussed with her to limit her fluid intake for a couple days here and see what happens.    Body mass index is 46.26 kg/m².    .    History of optic neuritis- (present on admission)  Assessment & Plan  12/24-12/27 - stable  - Previous Plans by Other Providers -  Per her report and she follows Dr. Wilkins.  12/22. Call Dr. Yoder tomorrow.    Mild intermittent asthma without complication- (present on admission)  Assessment & Plan  12/26-12/27 - Patient on 5 respiratory medications for a problem very likely exacerbated by fluid overload from daily soda intake.  Singulair also may be a neuro-active medication as well adding one more neuroactive medication to the list.  12/25 - Patient's 6-pack of 20oz dr greco is gone... might be a daily overhydration total body interstitial edema from severe soda intake component to her asthma...  12/24 - stable. Patient had a 6 pack of 20oz bottles of diet dr pepper on the table.  - Previous " Plans by Other Providers -  Controlled on Symbicort, montelukast  Continue RT protocol, duo nebs, Pep therapy if warranted, and incentive spirometry.       Mastoiditis  Assessment & Plan  12/24-12/27 - stable  - Previous Plans by Other Providers -  CT head shows Stable partial opacification of left mastoid air cells, consider effusion or component of mastoiditis as clinically appropriate  Resume augmentin.    Immunocompromised (HCC)- (present on admission)  Assessment & Plan  12/24-12/27 - stable  - Previous Plans by Other Providers -  On steroids and immunosuppressants for optic neuritis followed byrahel Newton, NeuroOphthalmology    Acquired hypothyroidism- (present on admission)  Assessment & Plan  12/24-12/27 - stable  - Previous Plans by Other Providers -  Resume home dose of Synthroid  Last month her TSH was within normal limits    Type 2 diabetes mellitus with hyperglycemia, without long-term current use of insulin (HCC)- (present on admission)  Assessment & Plan  12/24-12/27 -stable  - Previous Plans by Other Providers -  Continue Insulin-sliding scale, accu-checks and hypoglycemia protocol.  Continue with Consistent Carbohydrate diet   Hold home dose of trulicity   Results from last 7 days   Lab Units 12/26/19  2027 12/26/19  1632 12/26/19  1155 12/26/19  0743 12/25/19  2108 12/25/19  1804 12/25/19  1208 12/25/19  0813   ACCU CHECK GLUCOSE 788 mg/dL 122* 104* 107* 139* 115* 132* 121* 165*         Depression- (present on admission)  Assessment & Plan  12/24-12/27 - stable  - Previous Plans by Other Providers -  Resume home dose of Prozac    GERD (gastroesophageal reflux disease)- (present on admission)  Assessment & Plan  Continue with Pepcid    Borderline personality disorder in adult (HCC)- (present on admission)  Assessment & Plan  12/24-12/27 - stable  - Previous Plans by Other Providers -  Continue with home dose of Prozac, Geodon and trazodone         Laboratory  Results from last 7 days   Lab Units  12/24/19  0505 12/23/19  0352   WBC 1501 K/uL 7.4 7.4   HGB 1503 g/dL 13.7 13.4   HCT 1504 % 42.8 40.9   PLATELET COUNT 1518 K/uL 150* 145*    Results from last 7 days   Lab Units 12/24/19  0505 12/23/19  0352   SODIUM 101 mmol/L 139 139   POTASSIUM 102 mmol/L 3.7 3.4*   CHLORIDE 103 mmol/L 107 106   CO2 104 mmol/L 20 25   ANION GAP ANION  12.0* 8.0    mg/dL 17 17   CREATININE 109 mg/dL 0.87 0.75   CALCIUM 105 mg/dL 9.0 9.0   GLUCOSE 112 mg/dL 101* 89   IF ALYSSA AMER 240984 mL/min/1.73 m 2 >60 >60   IF NON AFRICAN AMER [id number] mL/min/1.73 m 2 >60 >60             Intake/Output Summary (Last 24 hours) at 12/27/2019 0439  Last data filed at 12/26/2019 1330  Gross per 24 hour   Intake 240 ml   Output --   Net 240 ml    Vital Signs  Temp:  [36 °C (96.8 °F)-37.2 °C (98.9 °F)] 37.2 °C (98.9 °F)  Pulse:  [71-87] 71  Resp:  [16-20] 16  BP: (104-139)/(56-95) 110/62  SpO2:  [92 %-94 %] 94 %     Review of Systems  Review of Systems   Constitutional: Negative for fever and malaise/fatigue.   Skin: Negative for itching and rash.   Neurological: Positive for tingling, weakness and headaches.   Psychiatric/Behavioral: Negative for depression. The patient is not nervous/anxious.     Physical Exam  Physical Exam   Constitutional: She is oriented to person, place, and time. No distress.   Profoundly watery body habitus   Musculoskeletal:         General: Edema present. No deformity.   Neurological: She is alert and oriented to person, place, and time.   Skin: Skin is warm and dry. She is not diaphoretic.   Psychiatric:   Odd affect. Wants her grandmother to take care of her while she has to take care of her grand father with dementia.  She's 29 years old and expects her grandmother to be her caregiver.. not the other way around...   Nursing note and vitals reviewed.       Medications  acetaZOLAMIDE SR, 500 mg, Oral, DAILY  predniSONE, 10 mg, Oral, DAILY  OXcarbazepine, 150 mg, Oral, BID  gabapentin, 200 mg, Oral,  4X/DAY  traZODone, 50 mg, Oral, Q EVENING  modafinil, 100 mg, Oral, QAM  pregabalin, 300 mg, Oral, BID  Ivabradine HCl, 7.5 mg, Oral, BID  enoxaparin (LOVENOX) injection, 40 mg, Subcutaneous, DAILY  aspirin EC, 81 mg, Oral, DAILY  budesonide-formoterol, 2 Puff, Inhalation, BID  busPIRone, 10 mg, Oral, BID  FLUoxetine, 40 mg, Oral, DAILY  furosemide, 80 mg, Oral, DAILY  levothyroxine, 75 mcg, Oral, AM ES  mycophenolate, 1,000 mg, Oral, BID  potassium chloride SA, 20 mEq, Oral, BID  famotidine, 20 mg, Oral, QAM  sodium bicarbonate, 650 mg, Oral, BID  tiotropium, 1 Cap, Inhalation, DAILY  ziprasidone, 80 mg, Oral, BID  senna-docusate, 2 Tab, Oral, BID  insulin regular, 1-6 Units, Subcutaneous, 4X/DAY ACHS         Medications were reviewed today.     Imaging  DX-LUMBAR PUNCTURE FOR DIAGNOSIS   Final Result      1.  Fluoroscopic-guided lumbar puncture as described above.   2.  Opening pressure of 25 mm of water.      CT-HEAD W/O   Final Result      No acute intracranial abnormality.      DX-HIP-UNILATERAL-WITH PELVIS-1 VIEW RIGHT   Final Result      No acute osseous abnormality.      CT-HEAD W/O   Final Result         1.  No acute intracranial abnormality.   2.  Stable partial opacification of left mastoid air cells, consider effusion or component of mastoiditis as clinically appropriate      DX-CHEST-PORTABLE (1 VIEW)   Final Result      No evidence of acute cardiopulmonary process.

## 2019-12-27 NOTE — ASSESSMENT & PLAN NOTE
"12/29 - patient complained of increased back pain, but patent is quite non-ambulatory and chemically bed ridden.  Wants to keep new dose of Lyrica.  Notes that she wanted to take a nap (at 0930) in the morning, reminded her she just took a nap , called night time, states she had been given a medication to help her stay awake.  This seems ridiculous given she has been prescribed Geodon, which has powerful sedative properties twice per day, it's no wonder she has such a Dr. Pepper habit to even function. Discussion with pharmacy.  Decreasing Geodon to 40mg bid.  Recommendations would be to start to taper medication after 4 weeks of no effect from medication. Patient's been in the hospital two weeks and can hardly walk.  Safe to say this medication isn't having a desirable effect.  12/28 - Lyrica decreased from 300 bid to 150mg bid today.  12/27 - While inpatient patient had modafinil, trileptal meclizine and acetazolamide added. Bicarb discontinued today.  12/26 - This is polypharmacy and a prescribing cascade.  Home med-list is an absurdity.  Home med list does not include her \"medical marijuana.\" Patient takes a hand full of pills two time daily.  If we saw a person do this and didn't know them, we would rush them to the hospital as an intentional overdose with medications like this at this dose and number, yet she does this twice a day as directed.  Sodium Bicarb - taken for \"acidic blood\" she has been told.   Aside from the fact that this medication will fundamentally alter the pharmacodynamics of all the other medications she is taking one at a time, it will alter all of them.  Nevermind the upset stomach she'll get from taking this many medications while chasing them all with 40oz of Dr. Pepper everyday.  Lyrica and Gabapentin at her doses is simply not indicated.  Nevermind that both of these medications are enhancers to the effects of other medications with neuroactive mechanisms with street values.  " "Fluoxetine, buspar, geodon, and trazadone - four medications for these conditions is not supported by any evidence whatsoever to control these conditions.  Geodon is a medication that's used for agitation/combative behaviors in the acute care setting and here we have her on it daily, at large dose, and she comes in with altered level of consciousness?  That's what this medication does.  It sedates.  Add Lyrica and Gabapentin to it.  Of course she's sluggish and now on thyroid medication, which is unlikely to be absorbed properly anyway.  Her gut edema from her profound fluid overload/total body interstitial edema will prevent absorption of these medications predictably anyway as their pharmacodynamics are tested on \"healthy\" people.  Certainly not on people taking bicarb two times daily. Benadryl and ranitidine will also alter how the stomach/duodenum function in pharmacodynamics/kiinetics individually, and she's on both. Patient desperately needs to have her PCP draw the line and start discontinuing these medications deliberately and methodically.    predniSONE (DELTASONE) 10 MG Tab Take 10 mg by mouth every day. Dmitriy Harper M.D. Not Ordered   budesonide-formoterol (SYMBICORT) 160-4.5 MCG/ACT Aerosol Inhale 2 Puffs by mouth 2 Times a Day. John Albrecht D.O. Reordered   fluoxetine (PROZAC) 40 MG capsule Take 40 mg by mouth every day. John Albrecht D.O. Reordered   aspirin EC (ECOTRIN) 81 MG Tablet Delayed Response Take 81 mg by mouth every day. John Albrecht D.O. Reordered   busPIRone (BUSPAR) 10 MG Tab tablet Take 10 mg by mouth 2 times a day. John Albrecht D.O. Reordered   levothyroxine (SYNTHROID) 75 MCG Tab Take 75 mcg by mouth Every morning on an empty stomach. John Albrecht D.O. Reordered   pregabalin (LYRICA) 300 MG capsule Take 300 mg by mouth 2 Times a Day. John Albrecht D.O. Not Ordered   montelukast (SINGULAIR) 10 MG Tab Take 10 mg by mouth every day. John Albrecht D.O. Not " Ordered   ondansetron (ZOFRAN ODT) 4 MG TABLET DISPERSIBLE Take 4 mg by mouth every 6 hours as needed for Nausea. John Albrecht D.O. Not Ordered   potassium chloride SA (KDUR) 20 MEQ Tab CR Take 20 mEq by mouth 2 times a day. John Albrecht D.O. Reordered   ziprasidone (GEODON) 80 MG Cap Take 80 mg by mouth 2 Times a Day. John Albrecht D.O. Reordered   traZODone (DESYREL) 100 MG Tab Take 100 mg by mouth every evening. Jonh Albrecht D.O. Not Ordered   ranitidine (ZANTAC) 300 MG tablet Take 300 mg by mouth every morning. John Albrecht D.O. Reordered   diphenhydrAMINE-APAP, sleep, (TYLENOL PM EXTRA STRENGTH)  MG Tab Take 2 Tabs by mouth every evening. John Albrecht D.O. Not Ordered   Diclofenac Sodium (VOLTAREN) 1 % Gel Apply 1 Application to skin as directed 4 times a day as needed (on wrists, back, ankles, and feet). John Albrecht D.O. Not Ordered   tiotropium (SPIRIVA) 18 MCG Cap Inhale 1 Cap by mouth every day. John Albrecht D.O. Reordered   folic acid (FOLVITE) 800 MCG tablet Take 800 mg by mouth every day. John Albrecht D.O. Not Ordered   Ivabradine HCl (CORLANOR) 7.5 MG Tab Take 7.5 mg by mouth 2 Times a Day. John Albrecht D.O. Not Ordered   ipratropium-albuterol (DUONEB) 0.5-2.5 (3) MG/3ML nebulizer solution 3 mL by Nebulization route every 6 hours as needed for Shortness of Breath. John Albrecht D.O. Not Ordered   etonogestrel (NEXPLANON) 68 MG Implant implant Inject 1 Each as instructed Once. John Albrecht D.O. Not Ordered   mycophenolate (CELLCEPT) 500 MG tablet Take 1,000 mg by mouth 2 times a day. John Albrecht D.O. Reordered   gabapentin (NEURONTIN) 300 MG Cap Take 300 mg by mouth 4 times a day. John Albrecht D.O. Not Ordered   Dulaglutide (TRULICITY) 1.5 MG/0.5ML Solution Pen-injector Inject 1.5 mg as instructed every Friday. John Albrecht D.O. Not Ordered   sodium bicarbonate 325 MG Tab Take 650 mg by mouth 2 Times a Day. John Albrecht D.O. Reordered    albuterol 108 (90 Base) MCG/ACT Aero Soln inhalation aerosol Inhale 2 Puffs by mouth every 6 hours as needed for Shortness of Breath. John Albrecht D.O. Not Ordered   Melatonin 5 MG Tab Take 10 mg by mouth every evening. John Albrecht D.O. Not Ordered   furosemide (LASIX) 80 MG Tab Take 80 mg by mouth every day. John Albrecht D.O. Reordered   methocarbamol (ROBAXIN) 750 MG Tab Take 750 mg by mouth every 4 hours. John Albrecht D.O. Not Ordered

## 2019-12-28 LAB
GLUCOSE BLD-MCNC: 111 MG/DL (ref 65–99)
GLUCOSE BLD-MCNC: 114 MG/DL (ref 65–99)
GLUCOSE BLD-MCNC: 143 MG/DL (ref 65–99)
GLUCOSE BLD-MCNC: 93 MG/DL (ref 65–99)

## 2019-12-28 PROCEDURE — 700102 HCHG RX REV CODE 250 W/ 637 OVERRIDE(OP): Performed by: PSYCHIATRY & NEUROLOGY

## 2019-12-28 PROCEDURE — 700111 HCHG RX REV CODE 636 W/ 250 OVERRIDE (IP): Performed by: INTERNAL MEDICINE

## 2019-12-28 PROCEDURE — A9270 NON-COVERED ITEM OR SERVICE: HCPCS | Performed by: PSYCHIATRY & NEUROLOGY

## 2019-12-28 PROCEDURE — 82962 GLUCOSE BLOOD TEST: CPT | Mod: 91

## 2019-12-28 PROCEDURE — 770006 HCHG ROOM/CARE - MED/SURG/GYN SEMI*

## 2019-12-28 PROCEDURE — A9270 NON-COVERED ITEM OR SERVICE: HCPCS | Performed by: INTERNAL MEDICINE

## 2019-12-28 PROCEDURE — 99232 SBSQ HOSP IP/OBS MODERATE 35: CPT | Performed by: STUDENT IN AN ORGANIZED HEALTH CARE EDUCATION/TRAINING PROGRAM

## 2019-12-28 PROCEDURE — A9270 NON-COVERED ITEM OR SERVICE: HCPCS | Performed by: STUDENT IN AN ORGANIZED HEALTH CARE EDUCATION/TRAINING PROGRAM

## 2019-12-28 PROCEDURE — 700102 HCHG RX REV CODE 250 W/ 637 OVERRIDE(OP): Performed by: INTERNAL MEDICINE

## 2019-12-28 PROCEDURE — 700102 HCHG RX REV CODE 250 W/ 637 OVERRIDE(OP): Performed by: STUDENT IN AN ORGANIZED HEALTH CARE EDUCATION/TRAINING PROGRAM

## 2019-12-28 PROCEDURE — 99232 SBSQ HOSP IP/OBS MODERATE 35: CPT | Performed by: PSYCHIATRY & NEUROLOGY

## 2019-12-28 PROCEDURE — 700111 HCHG RX REV CODE 636 W/ 250 OVERRIDE (IP): Performed by: NURSE PRACTITIONER

## 2019-12-28 RX ORDER — PREGABALIN 75 MG/1
150 CAPSULE ORAL 2 TIMES DAILY
Status: DISCONTINUED | OUTPATIENT
Start: 2019-12-28 | End: 2019-12-31 | Stop reason: HOSPADM

## 2019-12-28 RX ADMIN — BUDESONIDE AND FORMOTEROL FUMARATE DIHYDRATE 2 PUFF: 160; 4.5 AEROSOL RESPIRATORY (INHALATION) at 04:19

## 2019-12-28 RX ADMIN — ACETAMINOPHEN 650 MG: 325 TABLET, FILM COATED ORAL at 21:27

## 2019-12-28 RX ADMIN — SENNOSIDES AND DOCUSATE SODIUM 2 TABLET: 8.6; 5 TABLET ORAL at 17:27

## 2019-12-28 RX ADMIN — DIPHENHYDRAMINE HYDROCHLORIDE 25 MG: 25 TABLET ORAL at 21:27

## 2019-12-28 RX ADMIN — MODAFINIL 100 MG: 100 TABLET ORAL at 04:19

## 2019-12-28 RX ADMIN — PREDNISONE 10 MG: 10 TABLET ORAL at 04:18

## 2019-12-28 RX ADMIN — ZIPRASIDONE HYDROCHLORIDE 80 MG: 80 CAPSULE ORAL at 17:28

## 2019-12-28 RX ADMIN — TIOTROPIUM BROMIDE 1 CAPSULE: 18 CAPSULE ORAL; RESPIRATORY (INHALATION) at 04:19

## 2019-12-28 RX ADMIN — ASPIRIN 81 MG: 81 TABLET, COATED ORAL at 04:18

## 2019-12-28 RX ADMIN — ACETAMINOPHEN 650 MG: 325 TABLET, FILM COATED ORAL at 10:16

## 2019-12-28 RX ADMIN — POTASSIUM CHLORIDE 20 MEQ: 20 TABLET, EXTENDED RELEASE ORAL at 17:30

## 2019-12-28 RX ADMIN — ACETAZOLAMIDE 500 MG: 500 CAPSULE, EXTENDED RELEASE ORAL at 17:30

## 2019-12-28 RX ADMIN — ENOXAPARIN SODIUM 40 MG: 100 INJECTION SUBCUTANEOUS at 04:18

## 2019-12-28 RX ADMIN — GABAPENTIN 200 MG: 100 CAPSULE ORAL at 12:10

## 2019-12-28 RX ADMIN — BUTALBITAL, ACETAMINOPHEN, AND CAFFEINE 1 TABLET: 50; 325; 40 TABLET ORAL at 07:43

## 2019-12-28 RX ADMIN — BUDESONIDE AND FORMOTEROL FUMARATE DIHYDRATE 2 PUFF: 160; 4.5 AEROSOL RESPIRATORY (INHALATION) at 17:27

## 2019-12-28 RX ADMIN — MYCOPHENOLATE MOFETIL 1000 MG: 250 CAPSULE ORAL at 18:25

## 2019-12-28 RX ADMIN — ZIPRASIDONE HYDROCHLORIDE 80 MG: 80 CAPSULE ORAL at 04:19

## 2019-12-28 RX ADMIN — OXCARBAZEPINE 150 MG: 150 TABLET, FILM COATED ORAL at 04:19

## 2019-12-28 RX ADMIN — IBUPROFEN 800 MG: 800 TABLET, FILM COATED ORAL at 12:37

## 2019-12-28 RX ADMIN — FLUOXETINE HYDROCHLORIDE 40 MG: 20 CAPSULE ORAL at 04:18

## 2019-12-28 RX ADMIN — BUSPIRONE HYDROCHLORIDE 10 MG: 10 TABLET ORAL at 04:18

## 2019-12-28 RX ADMIN — GABAPENTIN 200 MG: 100 CAPSULE ORAL at 17:27

## 2019-12-28 RX ADMIN — GABAPENTIN 200 MG: 100 CAPSULE ORAL at 07:43

## 2019-12-28 RX ADMIN — BUSPIRONE HYDROCHLORIDE 10 MG: 10 TABLET ORAL at 17:28

## 2019-12-28 RX ADMIN — LEVOTHYROXINE SODIUM 75 MCG: 75 TABLET ORAL at 04:18

## 2019-12-28 RX ADMIN — PREGABALIN 150 MG: 75 CAPSULE ORAL at 17:28

## 2019-12-28 RX ADMIN — PREGABALIN 300 MG: 100 CAPSULE ORAL at 04:19

## 2019-12-28 RX ADMIN — FUROSEMIDE 80 MG: 40 TABLET ORAL at 04:19

## 2019-12-28 RX ADMIN — OXCARBAZEPINE 150 MG: 150 TABLET, FILM COATED ORAL at 17:30

## 2019-12-28 RX ADMIN — FAMOTIDINE 20 MG: 20 TABLET ORAL at 04:18

## 2019-12-28 RX ADMIN — TRAZODONE HYDROCHLORIDE 50 MG: 100 TABLET ORAL at 17:28

## 2019-12-28 RX ADMIN — POTASSIUM CHLORIDE 20 MEQ: 20 TABLET, EXTENDED RELEASE ORAL at 04:19

## 2019-12-28 RX ADMIN — SENNOSIDES AND DOCUSATE SODIUM 2 TABLET: 8.6; 5 TABLET ORAL at 04:19

## 2019-12-28 RX ADMIN — GABAPENTIN 200 MG: 100 CAPSULE ORAL at 21:27

## 2019-12-28 RX ADMIN — MYCOPHENOLATE MOFETIL 1000 MG: 250 CAPSULE ORAL at 04:18

## 2019-12-28 ASSESSMENT — ENCOUNTER SYMPTOMS
NERVOUS/ANXIOUS: 0
HEADACHES: 1
DEPRESSION: 0
FEVER: 0
TINGLING: 1
WEAKNESS: 1

## 2019-12-28 NOTE — PROGRESS NOTES
Neurology Progress Note  Neurohospitalist Service, [facility]    Referring Physician: Francois Santamaria D.O.    Chief Complaint   Patient presents with   • Syncope     found down by mother, states that she last saw her 30min prior.    • Dizziness       HPI: Refer to initial documented Neurology H&P, as detailed in the patient's chart.    Interval History 12/27/19:  HA worse today    Review of systems: In addition to what is detailed in the HPI and/or updated in the interval history, all other systems reviewed and are negative.    Past Medical History:    has a past medical history of Abdominal pain, Anginal syndrome, Apnea, sleep, Arrhythmia, Arthritis, ASTHMA, Atrial fibrillation (HCC), Back pain, Borderline personality disorder (HCC), Breath shortness, Bronchitis, Cardiac arrhythmia, Chickenpox, Chronic UTI (9/18/2010), Cough, Daytime sleepiness, Depression, Diabetes (HCC), Diarrhea, Disorder of thyroid, Fall, Fatigue, Frequent headaches, Gasping for breath, Gynecological disorder, Headache(784.0), Heart burn, History of falling, Hypertension, Indigestion, Migraine, 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HCC), 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HCC), Nausea, Obesity, Pain (08-15-12), Painful joint, PCOS (polycystic ovarian syndrome), Pneumonia (2012), Psychosis (HCC), Renal disorder, Ringing in ears, Scoliosis, Shortness of breath, Sinus tachycardia (10/31/2013), Sleep apnea, Snoring, Tonsillitis, Tuberculosis, Urinary bladder disorder, Urinary incontinence, Weakness, and Wears glasses.    FHx:  family history includes Genitourinary () Problems in her sister; Heart Disease in her maternal grandmother and mother; Hypertension in her maternal grandmother, maternal uncle, and mother; No Known Problems in her sister; Other in her mother and sister; Sleep Apnea in her mother.    SHx:   reports that she has never smoked. She has never used smokeless tobacco. She reports current drug use. Frequency: 7.00 times per  week. Drugs: Marijuana and Oral. She reports that she does not drink alcohol.    Medications:    Current Facility-Administered Medications:   •  acetaZOLAMIDE SR (DIAMOX) capsule 500 mg, 500 mg, Oral, DAILY, Jose Ramires M.D., 500 mg at 12/26/19 1727  •  LORazepam (ATIVAN) injection 1 mg, 1 mg, Intramuscular, PRN, Jose Ramires M.D., 1 mg at 12/24/19 1055  •  predniSONE (DELTASONE) tablet 10 mg, 10 mg, Oral, DAILY, Dmitriy Harper M.D., 10 mg at 12/27/19 0435  •  OXcarbazepine (TRILEPTAL) tablet 150 mg, 150 mg, Oral, BID, Alexandria Sigala M.D., 150 mg at 12/27/19 0434  •  gabapentin (NEURONTIN) capsule 200 mg, 200 mg, Oral, 4X/DAY, Alexandria Sigala M.D., 200 mg at 12/27/19 1141  •  traZODone (DESYREL) tablet 50 mg, 50 mg, Oral, Q EVENING, Alexandria Sigala M.D., 50 mg at 12/26/19 1723  •  modafinil (PROVIGIL) tablet 100 mg, 100 mg, Oral, QAM, Alexandria Sigala M.D., 100 mg at 12/27/19 0434  •  ibuprofen (MOTRIN) tablet 800 mg, 800 mg, Oral, TID PRN, Dmitriy Harper M.D., 800 mg at 12/27/19 0800  •  pregabalin (LYRICA) capsule 300 mg, 300 mg, Oral, BID, John Albrecht D.O., 300 mg at 12/27/19 0434  •  Ivabradine HCl TABS 7.5 mg, 7.5 mg, Oral, BID, Beto Hendrix M.D., 7.5 mg at 12/27/19 0600  •  meclizine (ANTIVERT) tablet 12.5 mg, 12.5 mg, Oral, TID PRN, Beto Hendrix M.D., 12.5 mg at 12/27/19 0757  •  albuterol inhaler 2 Puff, 2 Puff, Inhalation, Q4HRS PRN, John Albrecht D.O., 2 Puff at 12/27/19 0436  •  albuterol (PROVENTIL) 2.5mg/0.5ml nebulizer solution 2.5 mg, 2.5 mg, Nebulization, Q4H PRN (RT), John Albrecht D.O., 2.5 mg at 12/18/19 1623  •  enoxaparin (LOVENOX) inj 40 mg, 40 mg, Subcutaneous, DAILY, VANIA Sanon, 40 mg at 12/27/19 0435  •  aspirin EC (ECOTRIN) tablet 81 mg, 81 mg, Oral, DAILY, John Albrecht D.O., 81 mg at 12/27/19 0435  •  budesonide-formoterol (SYMBICORT) 160-4.5 MCG/ACT inhaler 2 Puff, 2 Puff, Inhalation, BID,  John Albrecht D.O., 2 Puff at 12/27/19 0436  •  busPIRone (BUSPAR) tablet 10 mg, 10 mg, Oral, BID, John Albrecht D.O., 10 mg at 12/27/19 0448  •  FLUoxetine (PROZAC) capsule 40 mg, 40 mg, Oral, DAILY, ANTONI Yoon.O., 40 mg at 12/27/19 0435  •  furosemide (LASIX) tablet 80 mg, 80 mg, Oral, DAILY, John Albrecht D.O., 80 mg at 12/27/19 0435  •  levothyroxine (SYNTHROID) tablet 75 mcg, 75 mcg, Oral, AM ES, ANTONI Yoon.O., 75 mcg at 12/27/19 0435  •  mycophenolate (CELLCEPT) capsule 1,000 mg, 1,000 mg, Oral, BID, ANTONI Yoon.O., 1,000 mg at 12/27/19 0538  •  potassium chloride SA (Kdur) tablet 20 mEq, 20 mEq, Oral, BID, ANTONI Yoon.O., 20 mEq at 12/27/19 0435  •  famotidine (PEPCID) tablet 20 mg, 20 mg, Oral, QAM, John Albrecht D.O., 20 mg at 12/27/19 0435  •  tiotropium (SPIRIVA) 18 MCG inhalation capsule 1 Cap, 1 Cap, Inhalation, DAILY, ANTONI Yoon.O., 1 Cap at 12/27/19 0436  •  ziprasidone (GEODON) capsule 80 mg, 80 mg, Oral, BID, John Albrecht D.O., 80 mg at 12/27/19 0434  •  senna-docusate (PERICOLACE or SENOKOT S) 8.6-50 MG per tablet 2 Tab, 2 Tab, Oral, BID, 2 Tab at 12/27/19 0434 **AND** polyethylene glycol/lytes (MIRALAX) PACKET 1 Packet, 1 Packet, Oral, QDAY PRN, 1 Packet at 12/14/19 1714 **AND** magnesium hydroxide (MILK OF MAGNESIA) suspension 30 mL, 30 mL, Oral, QDAY PRN **AND** bisacodyl (DULCOLAX) suppository 10 mg, 10 mg, Rectal, QDAY PRN, ABBY YoonO.  •  acetaminophen (TYLENOL) tablet 650 mg, 650 mg, Oral, Q6HRS PRN, John Albrecht D.O., 650 mg at 12/27/19 1141  •  enalaprilat (VASOTEC) injection 1.25 mg, 1.25 mg, Intravenous, Q6HRS PRN, John Albrecht D.O.  •  ondansetron (ZOFRAN ODT) dispertab 4 mg, 4 mg, Oral, Q4HRS PRN, ABBY YoonOEffie, 4 mg at 12/23/19 0735  •  insulin regular (HUMULIN R) injection 1-6 Units, 1-6 Units, Subcutaneous, 4X/DAY ACHS, Stopped at 12/25/19 1100 **AND** Accu-Chek ACHS, , , Q AC AND BEDTIME(S) **AND**  NOTIFY MD and PharmD, , , Once **AND** glucose 4 g chewable tablet 16 g, 16 g, Oral, Q15 MIN PRN **AND** DEXTROSE 10% BOLUS 250 mL, 250 mL, Intravenous, Q15 MIN PRN, John Albrecht D.O.  •  acetaminophen/caffeine/butalbital 325-40-50 mg (FIORICET) -40 MG per tablet 1 Tab, 1 Tab, Oral, Q6HRS PRN, John Albrecht D.O., 1 Tab at 12/26/19 1426  •  diphenhydrAMINE (BENADRYL) tablet/capsule 25 mg, 25 mg, Oral, HS PRN, John Albrecht D.O., 25 mg at 12/26/19 1902  •  Respiratory Care per Protocol, , Nebulization, Continuous RT, John Albrecht D.O.    Physical Examination:     Vitals:    12/26/19 1520 12/26/19 2000 12/27/19 0400 12/27/19 0800   BP: 104/56 139/95 110/62 130/68   Pulse: 87 85 71 75   Resp: 17 20 16 16   Temp: 36 °C (96.8 °F) 36.3 °C (97.4 °F) 37.2 °C (98.9 °F) 36.6 °C (97.9 °F)   TempSrc: Temporal Temporal Temporal Temporal   SpO2: 93% 92% 94% 94%   Weight:       Height:           General: Patient is awake and in no acute distress  Eyes: examination of optic disks not indicated at this time  CV: RRR    NEUROLOGICAL EXAM:     Mental status: Awake, alert and fully oriented, follows commands  Speech and language: speech is clear and fluent. The patient is able to name and repeat.  Cranial nerve exam: Pupils are equal, round and reactive to light bilaterally. Visual fields are full. Extraocular muscles are intact. Sensation in the face is intact to light touch. Face is symmetric. Hearing to finger rub equal. Palate elevates symmetrically. Shoulder shrug is full. Tongue is midline.  Motor exam: Strength is 5/5 in all extremities both distally and proximally. Except LE left > right weaknessTone is normal. No abnormal movements were seen on exam.  Sensory exam: No sensory deficits identified   Deep tendon reflexes:  Hyper reflexia LE bilat left clonus  Coordination: no ataxia   Gait: deferred not tested    Objective Data:    Labs:  Lab Results   Component Value Date/Time    PROTHROMBTM 12.6 12/24/2019  05:05 AM    INR 0.93 12/24/2019 05:05 AM      Lab Results   Component Value Date/Time    WBC 7.4 12/24/2019 05:05 AM    WBC 6.1 07/20/2010 11:00 AM    RBC 4.59 12/24/2019 05:05 AM    RBC 4.38 07/20/2010 11:00 AM    HEMOGLOBIN 13.7 12/24/2019 05:05 AM    HEMATOCRIT 42.8 12/24/2019 05:05 AM    MCV 93.2 12/24/2019 05:05 AM    MCV 93 07/20/2010 11:00 AM    MCH 29.8 12/24/2019 05:05 AM    MCH 30.1 07/20/2010 11:00 AM    MCHC 32.0 (L) 12/24/2019 05:05 AM    MPV 11.3 12/24/2019 05:05 AM    NEUTSPOLYS 78.00 (H) 12/16/2019 05:56 AM    LYMPHOCYTES 14.80 (L) 12/16/2019 05:56 AM    MONOCYTES 4.80 12/16/2019 05:56 AM    EOSINOPHILS 1.50 12/16/2019 05:56 AM    BASOPHILS 0.40 12/16/2019 05:56 AM    ANISOCYTOSIS 1+ 07/08/2019 04:04 PM      Lab Results   Component Value Date/Time    SODIUM 139 12/24/2019 05:05 AM    POTASSIUM 3.7 12/24/2019 05:05 AM    CHLORIDE 107 12/24/2019 05:05 AM    CO2 20 12/24/2019 05:05 AM    GLUCOSE 101 (H) 12/24/2019 05:05 AM    BUN 17 12/24/2019 05:05 AM    CREATININE 0.87 12/24/2019 05:05 AM    CREATININE 0.75 (L) 07/20/2010 11:00 AM    BUNCREATRAT 19 07/20/2010 11:00 AM    GLOMRATE >59 07/20/2010 11:00 AM      Lab Results   Component Value Date/Time    CHOLSTRLTOT 150 11/08/2019 04:30 AM    LDL 70 11/08/2019 04:30 AM    HDL 50 11/08/2019 04:30 AM    TRIGLYCERIDE 149 11/08/2019 04:30 AM       Lab Results   Component Value Date/Time    ALKPHOSPHAT 54 12/16/2019 05:56 AM    ASTSGOT 14 12/16/2019 05:56 AM    ALTSGPT 30 12/16/2019 05:56 AM    TBILIRUBIN 0.8 12/16/2019 05:56 AM        Imaging/Testing:      Assessment and Plan:    Kristin Balderrama is a 29 y.o. female with relevant history of optic neuritis and LE weakness and clonus. Suspected demyelination.  Unable to get MRI due to bwel bladder stim. On cellcept and pred.  Has now worse than yesterday after LP significantly helped HA.  Unclear if spinal HA or return of increased CSF pressure. 25 cm H20 per report on LP.  Gave diamox and pt was doing well  yesterday now Has returned.  However pts mother also took away her diet dr bender which she drinks several per day and there may be a withdrawal HA. Will give caffeine and see if she improves.  If not will consider repeat LP with OP. Pt obese and on pred with significant increased risk of pseudotumor. Will discuss possible optic nerve sheath fenestration depending on sxs progression and response to therapies. Pt already on lasix will follow electrolytes. Pt on several neuroactive meds that can cause neuro side effects as well.  Will consider ONBs for ON.    Plan:    The evaluation of the patient, and recommended management, was discussed with the resident staff. I have performed a physical exam and reviewed and updated ROS and Plan today (12/27/2019). In review of yesterday's note (12/26/2019), there are no changes except as documented above.    Jose Ramires MD  Board Certified Neurology, ABPN  Board Certified Vascular Neurology, ABPN  (t) [phone]

## 2019-12-28 NOTE — CARE PLAN
Problem: Psychosocial Needs:  Goal: Level of anxiety will decrease  Outcome: PROGRESSING AS EXPECTED     Problem: Mobility  Goal: Risk for activity intolerance will decrease  Outcome: PROGRESSING AS EXPECTED

## 2019-12-28 NOTE — DISCHARGE PLANNING
Anticipated Discharge Disposition: SNF    Action: SW updated by Dr. Santamaria that patient is medically cleared to discharge to SNF when one is arranged. SW met with patient bedside to discuss discharge plan.  Per patient is gong to [facility].  KRYSTIAN unable to find a completed referral to [facility], so completed choice form was faxed to [facility] for referral follow up.      Barriers to Discharge: accepting SNF     Plan: Care Coordination to continue to monitor and assist as needed.

## 2019-12-28 NOTE — CARE PLAN
Problem: Safety  Goal: Will remain free from injury  Outcome: PROGRESSING AS EXPECTED  Goal: Will remain free from falls  Outcome: PROGRESSING AS EXPECTED     Problem: Respiratory:  Goal: Respiratory status will improve  Outcome: PROGRESSING AS EXPECTED    On  at night, supplemental oxygen as needed for sleep apnea

## 2019-12-29 LAB
GLUCOSE BLD-MCNC: 103 MG/DL (ref 65–99)
GLUCOSE BLD-MCNC: 110 MG/DL (ref 65–99)
GLUCOSE BLD-MCNC: 125 MG/DL (ref 65–99)
GLUCOSE BLD-MCNC: 129 MG/DL (ref 65–99)
OLIGOCLONAL BANDS CSF ELPH-IMP: NORMAL
OLIGOCLONAL BANDS CSF IEF: 0 BANDS (ref 0–1)
OLIGOCLONAL BANDS.IT SER+CSF QL: NEGATIVE

## 2019-12-29 PROCEDURE — 82962 GLUCOSE BLOOD TEST: CPT

## 2019-12-29 PROCEDURE — 99232 SBSQ HOSP IP/OBS MODERATE 35: CPT | Performed by: STUDENT IN AN ORGANIZED HEALTH CARE EDUCATION/TRAINING PROGRAM

## 2019-12-29 PROCEDURE — 700102 HCHG RX REV CODE 250 W/ 637 OVERRIDE(OP): Performed by: INTERNAL MEDICINE

## 2019-12-29 PROCEDURE — A9270 NON-COVERED ITEM OR SERVICE: HCPCS | Performed by: INTERNAL MEDICINE

## 2019-12-29 PROCEDURE — A9270 NON-COVERED ITEM OR SERVICE: HCPCS | Performed by: STUDENT IN AN ORGANIZED HEALTH CARE EDUCATION/TRAINING PROGRAM

## 2019-12-29 PROCEDURE — 700111 HCHG RX REV CODE 636 W/ 250 OVERRIDE (IP): Performed by: NURSE PRACTITIONER

## 2019-12-29 PROCEDURE — 700111 HCHG RX REV CODE 636 W/ 250 OVERRIDE (IP): Performed by: INTERNAL MEDICINE

## 2019-12-29 PROCEDURE — 700102 HCHG RX REV CODE 250 W/ 637 OVERRIDE(OP): Performed by: PSYCHIATRY & NEUROLOGY

## 2019-12-29 PROCEDURE — A9270 NON-COVERED ITEM OR SERVICE: HCPCS | Performed by: PSYCHIATRY & NEUROLOGY

## 2019-12-29 PROCEDURE — 770006 HCHG ROOM/CARE - MED/SURG/GYN SEMI*

## 2019-12-29 PROCEDURE — 700102 HCHG RX REV CODE 250 W/ 637 OVERRIDE(OP): Performed by: STUDENT IN AN ORGANIZED HEALTH CARE EDUCATION/TRAINING PROGRAM

## 2019-12-29 RX ORDER — ZIPRASIDONE HYDROCHLORIDE 40 MG/1
40 CAPSULE ORAL 2 TIMES DAILY
Status: DISCONTINUED | OUTPATIENT
Start: 2019-12-30 | End: 2019-12-31 | Stop reason: HOSPADM

## 2019-12-29 RX ADMIN — BUDESONIDE AND FORMOTEROL FUMARATE DIHYDRATE 2 PUFF: 160; 4.5 AEROSOL RESPIRATORY (INHALATION) at 04:33

## 2019-12-29 RX ADMIN — OXCARBAZEPINE 150 MG: 150 TABLET, FILM COATED ORAL at 04:34

## 2019-12-29 RX ADMIN — OXCARBAZEPINE 150 MG: 150 TABLET, FILM COATED ORAL at 17:37

## 2019-12-29 RX ADMIN — ZIPRASIDONE HYDROCHLORIDE 80 MG: 80 CAPSULE ORAL at 04:33

## 2019-12-29 RX ADMIN — FUROSEMIDE 80 MG: 40 TABLET ORAL at 04:33

## 2019-12-29 RX ADMIN — LEVOTHYROXINE SODIUM 75 MCG: 75 TABLET ORAL at 04:34

## 2019-12-29 RX ADMIN — MODAFINIL 100 MG: 100 TABLET ORAL at 04:34

## 2019-12-29 RX ADMIN — GABAPENTIN 200 MG: 100 CAPSULE ORAL at 09:16

## 2019-12-29 RX ADMIN — PREGABALIN 150 MG: 75 CAPSULE ORAL at 04:34

## 2019-12-29 RX ADMIN — FLUOXETINE HYDROCHLORIDE 40 MG: 20 CAPSULE ORAL at 04:33

## 2019-12-29 RX ADMIN — TRAZODONE HYDROCHLORIDE 50 MG: 100 TABLET ORAL at 17:15

## 2019-12-29 RX ADMIN — PREDNISONE 10 MG: 10 TABLET ORAL at 04:33

## 2019-12-29 RX ADMIN — BUSPIRONE HYDROCHLORIDE 10 MG: 10 TABLET ORAL at 17:17

## 2019-12-29 RX ADMIN — ACETAMINOPHEN 650 MG: 325 TABLET, FILM COATED ORAL at 04:33

## 2019-12-29 RX ADMIN — GABAPENTIN 200 MG: 100 CAPSULE ORAL at 22:16

## 2019-12-29 RX ADMIN — GABAPENTIN 200 MG: 100 CAPSULE ORAL at 12:31

## 2019-12-29 RX ADMIN — ZIPRASIDONE HYDROCHLORIDE 80 MG: 80 CAPSULE ORAL at 17:14

## 2019-12-29 RX ADMIN — GABAPENTIN 200 MG: 100 CAPSULE ORAL at 17:15

## 2019-12-29 RX ADMIN — SENNOSIDES AND DOCUSATE SODIUM 2 TABLET: 8.6; 5 TABLET ORAL at 17:14

## 2019-12-29 RX ADMIN — BUSPIRONE HYDROCHLORIDE 10 MG: 10 TABLET ORAL at 04:33

## 2019-12-29 RX ADMIN — ENOXAPARIN SODIUM 40 MG: 100 INJECTION SUBCUTANEOUS at 04:33

## 2019-12-29 RX ADMIN — MYCOPHENOLATE MOFETIL 1000 MG: 250 CAPSULE ORAL at 04:33

## 2019-12-29 RX ADMIN — BUDESONIDE AND FORMOTEROL FUMARATE DIHYDRATE 2 PUFF: 160; 4.5 AEROSOL RESPIRATORY (INHALATION) at 17:18

## 2019-12-29 RX ADMIN — ACETAMINOPHEN 650 MG: 325 TABLET, FILM COATED ORAL at 22:16

## 2019-12-29 RX ADMIN — POTASSIUM CHLORIDE 20 MEQ: 20 TABLET, EXTENDED RELEASE ORAL at 04:34

## 2019-12-29 RX ADMIN — FAMOTIDINE 20 MG: 20 TABLET ORAL at 04:34

## 2019-12-29 RX ADMIN — POTASSIUM CHLORIDE 20 MEQ: 20 TABLET, EXTENDED RELEASE ORAL at 17:17

## 2019-12-29 RX ADMIN — SENNOSIDES AND DOCUSATE SODIUM 2 TABLET: 8.6; 5 TABLET ORAL at 04:33

## 2019-12-29 RX ADMIN — PREGABALIN 150 MG: 75 CAPSULE ORAL at 17:16

## 2019-12-29 RX ADMIN — TIOTROPIUM BROMIDE 1 CAPSULE: 18 CAPSULE ORAL; RESPIRATORY (INHALATION) at 06:00

## 2019-12-29 RX ADMIN — ACETAMINOPHEN 650 MG: 325 TABLET, FILM COATED ORAL at 12:31

## 2019-12-29 RX ADMIN — ACETAZOLAMIDE 500 MG: 500 CAPSULE, EXTENDED RELEASE ORAL at 17:14

## 2019-12-29 RX ADMIN — ASPIRIN 81 MG: 81 TABLET, COATED ORAL at 04:33

## 2019-12-29 RX ADMIN — MYCOPHENOLATE MOFETIL 1000 MG: 250 CAPSULE ORAL at 17:37

## 2019-12-29 ASSESSMENT — ENCOUNTER SYMPTOMS
WEAKNESS: 1
TINGLING: 1
DEPRESSION: 0
HEADACHES: 1
NERVOUS/ANXIOUS: 0
FEVER: 0

## 2019-12-29 NOTE — PROGRESS NOTES
[facility] Medicine Daily Progress Note    Date of Service: 12/28/2019   Hospital Day: 13 Pat. Class: Inpatient     Chief Complaint   Patient presents with   • Syncope     found down by mother, states that she last saw her 30min prior.    • Dizziness    Interval Problem Update  Patient was seen and evaluated today for Syncope  Disposition: Remain IP   Hospital Course     ER Course  Patient's mother arrived home and found the patient down on the floor.  The patient had no memory of the event.  The mother did not witness any seizure activity.  Maximal amount of downtime 30 minutes.  The patient not had a fever chills or cough.  No complaints of chest pain shortness of breath or abdominal pain.  She has not had any complaints of headache neck stiffness or photophobia.  No incontinence occurred.  No other somatic    Admission Day Course  29 y.o. female who presented 12/13/2019 with altered level consciousness.  At this point in time, patient is mostly nonverbal, information obtained from her grandmother who was the one who found her.  She states whenever she left she was sleeping but just prior to this had been normal.  She states this morning she had some shortness of breath, she does have a history of asthma.  She did use breathing treatments and a home nurse helped with her breathing and it did seem to normalize.  Her grandmother left, was gone around 90 minutes, upon returning home she found her down, unconscious.  She did call 911.  She did attempt to wake up her granddaughter for quite some time and eventually she did wake up but seemed very groggy and confused.  She states she did just finish her antibiotics for her UTI/bronchitis, otherwise has been taking her usual home medications.  She has not been missing any medications nor taking extra of her medications.  I did discuss the case including labs and imaging with the ER physician.  12/14 - This morning the patient reports 8/10 frontal headache, unsteadiness,  "new blurry vision, persistent dizziness, bilateral lower extremity neuropathy, not feeling well, nausea, constipation and not being ready for discharge home. She denies shortness of breath, chest pain, dysuria. She ambulated to the bathroom with nursing supervision without incident  12/15 - Patient is complaining of having dizziness, she feels dehydrated and just overall not feeling well.  Denies any fevers or chills.  Is complaining of lower extremity spasms as well.  12/16 - Patient is still complaining of mild nausea, having back spasms which is chronic, 7/10 in severity at times. Feels like she needs more time to feel better. Denies fevers/chills, denies dysuria or diarrhea.  12/17. ON chart review: History of cellulitis in the breast. 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 Immunocompromised, on prednisone and Cellcept for optic neuritis and \"poor immune system\"; assumably she follows Dr. Newton, Neuro-Ophthalmology. History of atrial fibrillation and cardiomyopathy? On Lasix, aspirin and ivadrabine. July 2019 echo: Normal left ventricular systolic function..Left ventricular ejection fraction is visually estimated to be 65%.. Normal left ventricular wall thickness. Normal left atrial size..Trace mitral regurgitation. Structurally normal aortic valve without significant stenosis or Regurgitation. Estimated right ventricular systolic pressure  is 30 mmHg. Trace tricuspid regurgitation.. Normal right ventricular size. Also chronic pain and neuropathy. Patient fell per nursing and now mentions she has intractable R hip pain and can't walk. Ordered Hip XR and that turned out to be normal, no arthritis. Later she mentioned she choked on a piece of meat and is short of breath, She however speaks full sentences, not hypoxic, not wheezing. . She is on several pain medications and anti-inflammatories; sees Pain Clinic and Dr. Cabral, Neuroophthalmology. She mentions she is on low dose steroids prescribed to her by " "Dr. Wilkins.  12/18. Patient \"fell to knees\" again. Discussed with Dr. Sabillon in depth.  12/19. Unchanged nonspecific complaints of pain and weakness. I called dr. Chowdary, who is following her for optic neuritis. I then called Dr. Rincon, and Cande Jennings, Neurology  12/20. No issues or complaints currently. Grandmother at bedside with questions.. SW/Palliative asking about competency eval as patient desires to be DNR.. For now I tony er Speech for cognitive eval. Psych has been involved so may need formal neurocognitive testing for competency  12/21. No new issues or concerns when I saw her. Later nurse reports she is having \"vision problems\" similar to optic neuritis flare. Of note, she is on high dose prednisone.   12/22. She feels she has \"eye sypmtoms\" and paresthesias. Her grandmother worried about her eating that she is not getting enough because she needs solid foods. She is on a pureed diet.   12/23 - Patient is still complaining of mild nausea, having back spasms which is chronic, 7/10 in severity at times. Feels like she needs more time to feel better. Denies fevers/chills, denies dysuria or diarrhea  12/24 - Patient waiting to be transferred to nursing home to have someone take care of her.. no new developments. Patient consumes excessive amount of diet dr aguilar.  12/25 - Awaiting placement.  Patient awaiting rehab.  Neurology concerned this is very much a poly-pharmacy problem, to which I am in complete agreement.  Will gently address concerns about this with the patient tomorrow.  12/26 - Patient very amenable to discussion today about how the prescribing cascade has negatively affected her life.  Awaiting placement, but ultimately she needs a taper plan.  A skilled nursing facility with a physician willing to start it may be the most appropriate place to do this.  12/27 - Caffeine withdrawal headache.  Patient expressed her gratitude to me for caring.  Patient's grandmother got rid of all " the Dr. Pepper.  Patient sitting upright in the bed today for the first time since I have been seeing her.  12/28 - Neurology is calling this Trigeminal Neuralgia and is clearing patient to discharge.  Agree.  Placement now cleared.  Changed lyrica from 300 bid to 150 bid.    Code Status: DNAR/DNI  Consultants/Specialty:  Psych, Neurology, Palliative Care Procedures During Hospitalization:  None   Lines, Drains, Airways, Wounds  Lines:  Drains:  Airway:  Wounds: VTE prophylaxis: Lovenox        Diet Order Diabetic    Assessment/Plan  * Altered level of consciousness- (present on admission)  Assessment & Plan  Highly suspect this is secondary to polypharmacy, patient is on a lot of sedating medications. She does have a history of accidental overdose in the past but denies this. No seizure activity noted, no evidence of rhabdomyolysis. Currently mentation has resolved, is AAOx4 past 2 days. He had a long discussion in terms of decreasing her sedating medications.  12/17. No changes in her pain regimen and sedatives. Follow up to her Pain Clinic.  12/18. Discussed with Dr. Sabillon. Not very clear diagnoses but managed for functional transverse myelitis, possible optic neuritis, seen by Dr. Newton. Spine stimulator limits further evaluation for other causes of enceiphalopathy/weakness/falls such as MRIs. Taper of gabapentin and transition to Trileptal. Modenalfil also started. Monitor her symptoms of weakness causing her to fall  12/19. May have a functional component but Dr. Chowdary feels there could be neurologic disease. He recommended formal neurology consult to r/o transverse myelitis or CNS lesions and see if it is acceptable to get a CT myelogram since MRI is contraindicated due to her gastric pacer/stimulator, Discussed with Neurology. Has had CT myelogram in the past per their records and seen Dr. Fox. Has been nondiagnostic. Currently no other possible myelopathy diagnosis. Symptomatology also  "inconsistent as she has reflexes. She has been worked up for myopathies; on Dr. Fox's notes in the past, muscle biopsy was nondiagnostic.  In the past IV Solumedrol was given and per Dr. Fox, shpowed some benefit for a diagnosis of \"paralysis\" and \"lower motor neuron etiology\". 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 This was 2 years ago however and recently she has been experiencing weakness and falls again. Currently agree that no need for CT myelogram right now, will evaluate if high dose steroids will be of any benefit otherwise she will have to follow-up with Dr. Fox. Ordered 3d course of Solu-medrol to see if there is any benefit, as Neurology recommended no harm to try what worked in the past.  12/20. Discussed plan with grandmother and patient. Will do the steroid trial. Poor IV access so switch to prednisone 60x 1 followe dby pred 10 as per Dr. Chowdary's outpatient regimen for optic neuritis NOS. 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 Inpatient Neurology has no further comment and recommnd following Dr. Fox, neurology for further management. Meanwhile grandmother admits she cannot care for her 24/7 so she is awaiting SNF/rehab/ SW working on it.  12/22. Ordered supplements. Dietary and Speech consults  12/23. Consulted Dr. Ramires, neurology for second opinion. 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 She has a history of back surgery, obesity and she feels she needs anesthesia (does not want bedside LP) therefore ordered head CT followed by IR guided LP. Ordered encephalitis panel. Left message to Dr. Chowdary who isn't in the office today/vacation.  12/24 - Lumbar puncture today.  Patient's mentation is quite improved.  States she needs to have her grandmother be her caregiver.  Her " grandmother is struggling to take care of her grandfather with dementia.  Patient wants to go to a rehab/nursing facility.  Agree with potential for this to be polypharmacy.  Neuro pending  12/25 - LP complete - Opening pressures of 25, neuro started acetazolamide. Symptomatically, ALOC is considered resolved.  12/26-12/28 - Resolved, although given patient's extent of polypharmacy from appropriate use of prescribed medications (prescribing cascade) I'm not even sure she knows what an unaltered level of consciousness would be like.    Advanced care planning/counseling discussion  Assessment & Plan  12/20. Patient has indicated to Palliative that she wants to be DNR. Currently she does not have a diagnosis to say that she is terminal. She also has been followed by Psychiatry and has had altered mentation in the past. I explained to Palliative and SW that a formal neuro cognitive eval needs to be done for competency as I can only assess capacity. SLP to do cognitive eval and will let me know if formal cognitive eval needed, and if so I will consult Psychology.  12/21. Speech for cognitive eval, and if they recommend formal testing for competency will have Psychology evaluate., By definition: Competency is a global assessment and legal determination made by a  in court. Capacity is a functional assessment and a clinical determination about a specific decision that can be made by any clinician familiar with a patient’s case. Hospitalists frequently encounter situations in which a patient’s capacity is called into question; in most cases, this is a determination a hospitalist can make independent of consultants.  12/23. Awaiting SLP for cognitive eval. Reconsult Psych to assess if there is any behavioral component to affect her competency. Psychology consulted; depends on SLP and Psychiatry. If cognitive eval normal and no behaviporal issue/depression or suicidality from the aove evaluating services then DNR/DNI  "POLST and documentation for competency can be signed.  12/24-12/28 - Stable for now.  Patient really struggling more with self-defeating and choices of continuing to be victim to her past hurts.  Palliative care states patient desires to be a DNR.      Polypharmacy- (present on admission)  Assessment & Plan  12/28 - Lyrica decreased from 300 bid to 150mg bid today.  12/27 - While inpatient patient had modafinil, trileptal meclizine and acetazolamide added. Bicarb discontinued today.  12/26 - This is polypharmacy and a prescribing cascade.  Home med-list is an absurdity.  Home med list does not include her \"medical marijuana.\" Patient takes a hand full of pills two time daily.  If we saw a person do this and didn't know them, we would rush them to the hospital as an intentional overdose with medications like this at this dose and number, yet she does this twice a day as directed.  Sodium Bicarb - taken for \"acidic blood\" she has been told.   Aside from the fact that this medication will fundamentally alter the pharmacodynamics of all the other medications she is taking one at a time, it will alter all of them.  Nevermind the upset stomach she'll get from taking this many medications while chasing them all with 40oz of Dr. Pepper everyday.  Lyrica and Gabapentin at her doses is simply not indicated.  Nevermind that both of these medications are enhancers to the effects of other medications with neuroactive mechanisms with street values.  Fluoxetine, buspar, geodon, and trazadone - four medications for these conditions is not supported by any evidence whatsoever to control these conditions.  Geodon is a medication that's used for agitation/combative behaviors in the acute care setting and here we have her on it daily, at large dose, and she comes in with altered level of consciousness?  That's what this medication does.  It sedates.  Add Lyrica and Gabapentin to it.  Of course she's sluggish and now on thyroid " "medication, which is unlikely to be absorbed properly anyway.  Her gut edema from her profound fluid overload/total body interstitial edema will prevent absorption of these medications predictably anyway as their pharmacodynamics are tested on \"healthy\" people.  Certainly not on people taking bicarb two times daily. Benadryl and ranitidine will also alter how the stomach/duodenum function in pharmacodynamics/kiinetics individually, and she's on both. Patient desperately needs to have her PCP draw the line and start discontinuing these medications deliberately and methodically.    predniSONE (DELTASONE) 10 MG Tab Take 10 mg by mouth every day. Dmitriy Harper M.D. Not Ordered   budesonide-formoterol (SYMBICORT) 160-4.5 MCG/ACT Aerosol Inhale 2 Puffs by mouth 2 Times a Day. John Albrecht D.O. Reordered   fluoxetine (PROZAC) 40 MG capsule Take 40 mg by mouth every day. John Albrecht D.O. Reordered   aspirin EC (ECOTRIN) 81 MG Tablet Delayed Response Take 81 mg by mouth every day. John Albrecht D.O. Reordered   busPIRone (BUSPAR) 10 MG Tab tablet Take 10 mg by mouth 2 times a day. John Albrecht D.O. Reordered   levothyroxine (SYNTHROID) 75 MCG Tab Take 75 mcg by mouth Every morning on an empty stomach. John Albrecht D.O. Reordered   pregabalin (LYRICA) 300 MG capsule Take 300 mg by mouth 2 Times a Day. John Albrecht D.O. Not Ordered   montelukast (SINGULAIR) 10 MG Tab Take 10 mg by mouth every day. John Albrecht D.O. Not Ordered   ondansetron (ZOFRAN ODT) 4 MG TABLET DISPERSIBLE Take 4 mg by mouth every 6 hours as needed for Nausea. John Albrecht D.O. Not Ordered   potassium chloride SA (KDUR) 20 MEQ Tab CR Take 20 mEq by mouth 2 times a day. John Albrecht D.O. Reordered   ziprasidone (GEODON) 80 MG Cap Take 80 mg by mouth 2 Times a Day. John Albrecht D.O. Reordered   traZODone (DESYREL) 100 MG Tab Take 100 mg by mouth every evening. John R Albrecht, D.O. Not Ordered   ranitidine (ZANTAC) " 300 MG tablet Take 300 mg by mouth every morning. John Albrecht D.O. Reordered   diphenhydrAMINE-APAP, sleep, (TYLENOL PM EXTRA STRENGTH)  MG Tab Take 2 Tabs by mouth every evening. John Albrecht D.O. Not Ordered   Diclofenac Sodium (VOLTAREN) 1 % Gel Apply 1 Application to skin as directed 4 times a day as needed (on wrists, back, ankles, and feet). John Albrecht D.O. Not Ordered   tiotropium (SPIRIVA) 18 MCG Cap Inhale 1 Cap by mouth every day. John Albrecht D.O. Reordered   folic acid (FOLVITE) 800 MCG tablet Take 800 mg by mouth every day. John Albrecht D.O. Not Ordered   Ivabradine HCl (CORLANOR) 7.5 MG Tab Take 7.5 mg by mouth 2 Times a Day. John Albrecht D.O. Not Ordered   ipratropium-albuterol (DUONEB) 0.5-2.5 (3) MG/3ML nebulizer solution 3 mL by Nebulization route every 6 hours as needed for Shortness of Breath. John Albrecht D.O. Not Ordered   etonogestrel (NEXPLANON) 68 MG Implant implant Inject 1 Each as instructed Once. John Albrecht D.O. Not Ordered   mycophenolate (CELLCEPT) 500 MG tablet Take 1,000 mg by mouth 2 times a day. John Albrecht D.O. Reordered   gabapentin (NEURONTIN) 300 MG Cap Take 300 mg by mouth 4 times a day. John Albrecht D.O. Not Ordered   Dulaglutide (TRULICITY) 1.5 MG/0.5ML Solution Pen-injector Inject 1.5 mg as instructed every Friday. John Albrecht D.O. Not Ordered   sodium bicarbonate 325 MG Tab Take 650 mg by mouth 2 Times a Day. John Albrecht D.O. Reordered   albuterol 108 (90 Base) MCG/ACT Aero Soln inhalation aerosol Inhale 2 Puffs by mouth every 6 hours as needed for Shortness of Breath. John Albrecht D.O. Not Ordered   Melatonin 5 MG Tab Take 10 mg by mouth every evening. John Albrecht D.O. Not Ordered   furosemide (LASIX) 80 MG Tab Take 80 mg by mouth every day. John Albrecht D.O. Reordered   methocarbamol (ROBAXIN) 750 MG Tab Take 750 mg by mouth every 4 hours. John Albrecht D.O. Not Ordered         Morbidly obese  "([facility])- (present on admission)  Assessment & Plan  12/28 - Stable.  12/27 - Continued discussion about what we had talked about.  Further discussion about how the patient's prescribing cascade is also playing into this.  She hates that she has 5-6 different kinds of doctors and spends all of her time at doctor appointments.  Interested in discussion about beginning to taper medications in the outpatient and that it would require finding a physician willing to start this process.  Discussion that it could take 18 months in a clinic to help give her her life back  12/26 - Patient consumes a lot of soda.  Possibly up to a gallon/day of total fluid intake.  Known to have consumed at least 80oz of diet dr. Greco in a single day while lying in bed with shaky legs.  Symptoms that will get worse as she weakens and further debilitates while laying in bed.  Patient already on lasix, discussion with patient regarding fluid volume intake, she fully agrees that she feels like she's bogged down by water instead of something similar to the behavior or butter. While she does have a bit extra fluff, likely from the sugar or artificial sweateners in the diet sodas, her \"morbid obesity\" is multi-factorial and won't be fixed with \"diet and exercise.\"  Patient is already on lasix, discussed with her to limit her fluid intake for a couple days here and see what happens.    Body mass index is 46.26 kg/m².    .    History of optic neuritis- (present on admission)  Assessment & Plan  12/24-12/28 - stable  - Previous Plans by Other Providers -  Per her report and she follows Dr. Wilkins.  12/22. Call Dr. Yoder tomorrow.    Mild intermittent asthma without complication- (present on admission)  Assessment & Plan  12/26-12/28 - Patient on 5 respiratory medications for a problem very likely exacerbated by fluid overload from daily soda intake.  Singulair also may be a neuro-active medication as well adding one more neuroactive medication to " the list.  12/25 - Patient's 6-pack of 20oz dr aguilar is gone... might be a daily overhydration total body interstitial edema from severe soda intake component to her asthma...  12/24 - stable. Patient had a 6 pack of 20oz bottles of diet dr pepper on the table.  - Previous Plans by Other Providers -  Controlled on Symbicort, montelukast  Continue RT protocol, duo nebs, Pep therapy if warranted, and incentive spirometry.       Mastoiditis  Assessment & Plan  12/24-12/28 - stable  - Previous Plans by Other Providers -  CT head shows Stable partial opacification of left mastoid air cells, consider effusion or component of mastoiditis as clinically appropriate  Resume augmentin.    Immunocompromised (HCC)- (present on admission)  Assessment & Plan  12/24-12/28 - stable  - Previous Plans by Other Providers -  On steroids and immunosuppressants for optic neuritis followed byu Dr. Newton, NeuroOphthalmology    Acquired hypothyroidism- (present on admission)  Assessment & Plan  12/24-12/28 - stable  - Previous Plans by Other Providers -  Resume home dose of Synthroid  Last month her TSH was within normal limits    Type 2 diabetes mellitus with hyperglycemia, without long-term current use of insulin (HCC)- (present on admission)  Assessment & Plan  12/24-12/28 -stable  - Previous Plans by Other Providers -  Continue Insulin-sliding scale, accu-checks and hypoglycemia protocol.  Continue with Consistent Carbohydrate diet   Hold home dose of trulicity   Results from last 7 days   Lab Units 12/26/19  2027 12/26/19  1632 12/26/19  1155 12/26/19  0743 12/25/19  2108 12/25/19  1804 12/25/19  1208 12/25/19  0813   ACCU CHECK GLUCOSE 788 mg/dL 122* 104* 107* 139* 115* 132* 121* 165*         Depression- (present on admission)  Assessment & Plan  12/24-12/28 - stable  - Previous Plans by Other Providers -  Resume home dose of Prozac    GERD (gastroesophageal reflux disease)- (present on admission)  Assessment & Plan  12/28 - given  use of concurrent bicarb and ppi...  Patient's entire absorptive pathway starting in her stomach has been completely distorted.  - Previous Plans by Other Providers -  Continue with Pepcid    Borderline personality disorder in adult (HCC)- (present on admission)  Assessment & Plan  12/24-12/28 - stable  - Previous Plans by Other Providers -  Continue with home dose of Prozac, Geodon and trazodone         Laboratory  Results from last 7 days   Lab Units 12/24/19  0505 12/23/19  0352   WBC 1501 K/uL 7.4 7.4   HGB 1503 g/dL 13.7 13.4   HCT 1504 % 42.8 40.9   PLATELET COUNT 1518 K/uL 150* 145*    Results from last 7 days   Lab Units 12/24/19  0505 12/23/19  0352   SODIUM 101 mmol/L 139 139   POTASSIUM 102 mmol/L 3.7 3.4*   CHLORIDE 103 mmol/L 107 106   CO2 104 mmol/L 20 25   ANION GAP ANION  12.0* 8.0    mg/dL 17 17   CREATININE 109 mg/dL 0.87 0.75   CALCIUM 105 mg/dL 9.0 9.0   GLUCOSE 112 mg/dL 101* 89   IF ALYSSA AMER 259092 mL/min/1.73 m 2 >60 >60   IF NON AFRICAN AMER [id number] mL/min/1.73 m 2 >60 >60             Intake/Output Summary (Last 24 hours) at 12/28/2019 2143  Last data filed at 12/28/2019 1330  Gross per 24 hour   Intake 720 ml   Output --   Net 720 ml    Vital Signs  Temp:  [36.1 °C (97 °F)-36.4 °C (97.5 °F)] 36.4 °C (97.5 °F)  Pulse:  [70-87] 87  Resp:  [16-20] 20  BP: (109-118)/(66-72) 109/66  SpO2:  [90 %-94 %] 94 %     Review of Systems  Review of Systems   Constitutional: Negative for fever and malaise/fatigue.   Skin: Negative for itching and rash.   Neurological: Positive for tingling, weakness and headaches.   Psychiatric/Behavioral: Negative for depression. The patient is not nervous/anxious.     Physical Exam  Physical Exam   Constitutional: She is oriented to person, place, and time. No distress.   Profoundly watery body habitus   Musculoskeletal:         General: Edema present. No deformity.   Neurological: She is alert and oriented to person, place, and time.   Skin: Skin is warm and  dry. She is not diaphoretic.   Psychiatric:   Odd affect. Wants her grandmother to take care of her while she has to take care of her grand father with dementia.  She's 29 years old and expects her grandmother to be her caregiver.. not the other way around...   Nursing note and vitals reviewed.       Medications  pregabalin, 150 mg, Oral, BID  acetaZOLAMIDE SR, 500 mg, Oral, DAILY  predniSONE, 10 mg, Oral, DAILY  OXcarbazepine, 150 mg, Oral, BID  gabapentin, 200 mg, Oral, 4X/DAY  traZODone, 50 mg, Oral, Q EVENING  modafinil, 100 mg, Oral, QAM  Ivabradine HCl, 7.5 mg, Oral, BID  enoxaparin (LOVENOX) injection, 40 mg, Subcutaneous, DAILY  aspirin EC, 81 mg, Oral, DAILY  budesonide-formoterol, 2 Puff, Inhalation, BID  busPIRone, 10 mg, Oral, BID  FLUoxetine, 40 mg, Oral, DAILY  furosemide, 80 mg, Oral, DAILY  levothyroxine, 75 mcg, Oral, AM ES  mycophenolate, 1,000 mg, Oral, BID  potassium chloride SA, 20 mEq, Oral, BID  famotidine, 20 mg, Oral, QAM  tiotropium, 1 Cap, Inhalation, DAILY  ziprasidone, 80 mg, Oral, BID  senna-docusate, 2 Tab, Oral, BID  insulin regular, 1-6 Units, Subcutaneous, 4X/DAY ACHS         Medications were reviewed today.     Imaging  DX-LUMBAR PUNCTURE FOR DIAGNOSIS   Final Result      1.  Fluoroscopic-guided lumbar puncture as described above.   2.  Opening pressure of 25 mm of water.      CT-HEAD W/O   Final Result      No acute intracranial abnormality.      DX-HIP-UNILATERAL-WITH PELVIS-1 VIEW RIGHT   Final Result      No acute osseous abnormality.      CT-HEAD W/O   Final Result         1.  No acute intracranial abnormality.   2.  Stable partial opacification of left mastoid air cells, consider effusion or component of mastoiditis as clinically appropriate      DX-CHEST-PORTABLE (1 VIEW)   Final Result      No evidence of acute cardiopulmonary process.

## 2019-12-29 NOTE — PROGRESS NOTES
[facility] Medicine Daily Progress Note    Date of Service: 12/29/2019   Hospital Day: 14 Pat. Class: Inpatient     Chief Complaint   Patient presents with   • Syncope     found down by mother, states that she last saw her 30min prior.    • Dizziness    Interval Problem Update  Patient was seen and evaluated today for Syncope  Disposition: Remain IP   Hospital Course     ER Course  Patient's mother arrived home and found the patient down on the floor.  The patient had no memory of the event.  The mother did not witness any seizure activity.  Maximal amount of downtime 30 minutes.  The patient not had a fever chills or cough.  No complaints of chest pain shortness of breath or abdominal pain.  She has not had any complaints of headache neck stiffness or photophobia.  No incontinence occurred.  No other somatic    Admission Day Course  29 y.o. female who presented 12/13/2019 with altered level consciousness.  At this point in time, patient is mostly nonverbal, information obtained from her grandmother who was the one who found her.  She states whenever she left she was sleeping but just prior to this had been normal.  She states this morning she had some shortness of breath, she does have a history of asthma.  She did use breathing treatments and a home nurse helped with her breathing and it did seem to normalize.  Her grandmother left, was gone around 90 minutes, upon returning home she found her down, unconscious.  She did call 911.  She did attempt to wake up her granddaughter for quite some time and eventually she did wake up but seemed very groggy and confused.  She states she did just finish her antibiotics for her UTI/bronchitis, otherwise has been taking her usual home medications.  She has not been missing any medications nor taking extra of her medications.  I did discuss the case including labs and imaging with the ER physician.  12/14 - This morning the patient reports 8/10 frontal headache, unsteadiness,  "new blurry vision, persistent dizziness, bilateral lower extremity neuropathy, not feeling well, nausea, constipation and not being ready for discharge home. She denies shortness of breath, chest pain, dysuria. She ambulated to the bathroom with nursing supervision without incident  12/15 - Patient is complaining of having dizziness, she feels dehydrated and just overall not feeling well.  Denies any fevers or chills.  Is complaining of lower extremity spasms as well.  12/16 - Patient is still complaining of mild nausea, having back spasms which is chronic, 7/10 in severity at times. Feels like she needs more time to feel better. Denies fevers/chills, denies dysuria or diarrhea.  12/17. ON chart review: History of cellulitis in the breast. 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 Immunocompromised, on prednisone and Cellcept for optic neuritis and \"poor immune system\"; assumably she follows Dr. Newton, Neuro-Ophthalmology. History of atrial fibrillation and cardiomyopathy? On Lasix, aspirin and ivadrabine. July 2019 echo: Normal left ventricular systolic function..Left ventricular ejection fraction is visually estimated to be 65%.. Normal left ventricular wall thickness. Normal left atrial size..Trace mitral regurgitation. Structurally normal aortic valve without significant stenosis or Regurgitation. Estimated right ventricular systolic pressure  is 30 mmHg. Trace tricuspid regurgitation.. Normal right ventricular size. Also chronic pain and neuropathy. Patient fell per nursing and now mentions she has intractable R hip pain and can't walk. Ordered Hip XR and that turned out to be normal, no arthritis. Later she mentioned she choked on a piece of meat and is short of breath, She however speaks full sentences, not hypoxic, not wheezing. . She is on several pain medications and anti-inflammatories; sees Pain Clinic and Dr. Cabral, Neuroophthalmology. She mentions she is on low dose steroids prescribed to her by " "Dr. Wilkins.  12/18. Patient \"fell to knees\" again. Discussed with Dr. Sabillon in depth.  12/19. Unchanged nonspecific complaints of pain and weakness. I called dr. Chowdary, who is following her for optic neuritis. I then called Dr. Rincon, and Cande Jennings, Neurology  12/20. No issues or complaints currently. Grandmother at bedside with questions.. SW/Palliative asking about competency eval as patient desires to be DNR.. For now I tony er Speech for cognitive eval. Psych has been involved so may need formal neurocognitive testing for competency  12/21. No new issues or concerns when I saw her. Later nurse reports she is having \"vision problems\" similar to optic neuritis flare. Of note, she is on high dose prednisone.   12/22. She feels she has \"eye sypmtoms\" and paresthesias. Her grandmother worried about her eating that she is not getting enough because she needs solid foods. She is on a pureed diet.   12/23 - Patient is still complaining of mild nausea, having back spasms which is chronic, 7/10 in severity at times. Feels like she needs more time to feel better. Denies fevers/chills, denies dysuria or diarrhea  12/24 - Patient waiting to be transferred to nursing home to have someone take care of her.. no new developments. Patient consumes excessive amount of diet dr aguilar.  12/25 - Awaiting placement.  Patient awaiting rehab.  Neurology concerned this is very much a poly-pharmacy problem, to which I am in complete agreement.  Will gently address concerns about this with the patient tomorrow.  12/26 - Patient very amenable to discussion today about how the prescribing cascade has negatively affected her life.  Awaiting placement, but ultimately she needs a taper plan.  A skilled nursing facility with a physician willing to start it may be the most appropriate place to do this.  12/27 - Caffeine withdrawal headache.  Patient expressed her gratitude to me for caring.  Patient's grandmother got rid of all " the Dr. Pepper.  Patient sitting upright in the bed today for the first time since I have been seeing her.  12/28 - Neurology is calling this Trigeminal Neuralgia and is clearing patient to discharge.  Agree.  Placement now cleared.  Changed lyrica from 300 bid to 150 bid.  12/29 - decreasing geodon to 40mg bid. Patient can hardly get out of bed at 0930 and needs a nap... 0600 dosing of geodon perhaps causing her to be sleepy and difficulty with ambulation depending on when PT/OT evaluate patient.    Code Status: DNAR/DNI  Consultants/Specialty:  Psych, Neurology, Palliative Care Procedures During Hospitalization:  None   Lines, Drains, Airways, Wounds  Lines:  Drains:  Airway:  Wounds: VTE prophylaxis: Lovenox        Diet Order Diabetic    Assessment/Plan  * Altered level of consciousness- (present on admission)  Assessment & Plan  Highly suspect this is secondary to polypharmacy, patient is on a lot of sedating medications. She does have a history of accidental overdose in the past but denies this. No seizure activity noted, no evidence of rhabdomyolysis. Currently mentation has resolved, is AAOx4 past 2 days. He had a long discussion in terms of decreasing her sedating medications.  12/17. No changes in her pain regimen and sedatives. Follow up to her Pain Clinic.  12/18. Discussed with Dr. Sabillon. Not very clear diagnoses but managed for functional transverse myelitis, possible optic neuritis, seen by Dr. Newton. Spine stimulator limits further evaluation for other causes of enceiphalopathy/weakness/falls such as MRIs. Taper of gabapentin and transition to Trileptal. Modenalfil also started. Monitor her symptoms of weakness causing her to fall  12/19. May have a functional component but Dr. Chowdary feels there could be neurologic disease. He recommended formal neurology consult to r/o transverse myelitis or CNS lesions and see if it is acceptable to get a CT myelogram since MRI is contraindicated due to  "her gastric pacer/stimulator, Discussed with Neurology. Has had CT myelogram in the past per their records and seen Dr. Fox. Has been nondiagnostic. Currently no other possible myelopathy diagnosis. Symptomatology also inconsistent as she has reflexes. She has been worked up for myopathies; on Dr. Fox's notes in the past, muscle biopsy was nondiagnostic.  In the past IV Solumedrol was given and per Dr. Fox, shpowed some benefit for a diagnosis of \"paralysis\" and \"lower motor neuron etiology\". 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 This was 2 years ago however and recently she has been experiencing weakness and falls again. Currently agree that no need for CT myelogram right now, will evaluate if high dose steroids will be of any benefit otherwise she will have to follow-up with Dr. Fox. Ordered 3d course of Solu-medrol to see if there is any benefit, as Neurology recommended no harm to try what worked in the past.  12/20. Discussed plan with grandmother and patient. Will do the steroid trial. Poor IV access so switch to prednisone 60x 1 followe dby pred 10 as per Dr. Chowdary's outpatient regimen for optic neuritis NOS. 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 Inpatient Neurology has no further comment and recommnd following Dr. Fox, neurology for further management. Meanwhile grandmother admits she cannot care for her 24/7 so she is awaiting SNF/rehab/ SW working on it.  12/22. Ordered supplements. Dietary and Speech consults  12/23. Consulted Dr. Ramires, neurology for second opinion. 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 She has a history of back surgery, obesity and she feels she needs anesthesia (does not want bedside LP) therefore ordered head CT followed by IR guided LP. Ordered " encephalitis panel. Left message to Dr. Chowdary who isn't in the office today/vacation.  12/24 - Lumbar puncture today.  Patient's mentation is quite improved.  States she needs to have her grandmother be her caregiver.  Her grandmother is struggling to take care of her grandfather with dementia.  Patient wants to go to a rehab/nursing facility.  Agree with potential for this to be polypharmacy.  Neuro pending  12/25 - LP complete - Opening pressures of 25, neuro started acetazolamide. Symptomatically, ALOC is considered resolved.  12/26-12/29 - Resolved, although given patient's extent of polypharmacy from appropriate use of prescribed medications (prescribing cascade) I'm not even sure she knows what an unaltered level of consciousness would be like.    Advanced care planning/counseling discussion- (present on admission)  Assessment & Plan  12/20. Patient has indicated to Palliative that she wants to be DNR. Currently she does not have a diagnosis to say that she is terminal. She also has been followed by Psychiatry and has had altered mentation in the past. I explained to Palliative and SW that a formal neuro cognitive eval needs to be done for competency as I can only assess capacity. SLP to do cognitive eval and will let me know if formal cognitive eval needed, and if so I will consult Psychology.  12/21. Speech for cognitive eval, and if they recommend formal testing for competency will have Psychology evaluate., By definition: Competency is a global assessment and legal determination made by a  in court. Capacity is a functional assessment and a clinical determination about a specific decision that can be made by any clinician familiar with a patient’s case. Hospitalists frequently encounter situations in which a patient’s capacity is called into question; in most cases, this is a determination a hospitalist can make independent of consultants.  12/23. Awaiting SLP for cognitive eval. Reconsult Psych  "to assess if there is any behavioral component to affect her competency. Psychology consulted; depends on SLP and Psychiatry. If cognitive eval normal and no behaviporal issue/depression or suicidality from the aove evaluating services then DNR/DNI POLST and documentation for competency can be signed.  12/24-12/29 - Stable for now.  Patient really struggling more with self-defeating and choices of continuing to be victim to her past hurts.  Palliative care states patient desires to be a DNR.      Polypharmacy- (present on admission)  Assessment & Plan  12/29 - patient complained of increased back pain, but patent is quite non-ambulatory and chemically bed ridden.  Wants to keep new dose of Lyrica.  Notes that she wanted to take a nap (at 0930) in the morning, reminded her she just took a nap , called night time, states she had been given a medication to help her stay awake.  This seems ridiculous given she has been prescribed Geodon, which has powerful sedative properties twice per day, it's no wonder she has such a Dr. Pepper habit to even function. Discussion with pharmacy.  Decreasing Geodon to 40mg bid.  Recommendations would be to start to taper medication after 4 weeks of no effect from medication. Patient's been in the hospital two weeks and can hardly walk.  Safe to say this medication isn't having a desirable effect.  12/28 - Lyrica decreased from 300 bid to 150mg bid today.  12/27 - While inpatient patient had modafinil, trileptal meclizine and acetazolamide added. Bicarb discontinued today.  12/26 - This is polypharmacy and a prescribing cascade.  Home med-list is an absurdity.  Home med list does not include her \"medical marijuana.\" Patient takes a hand full of pills two time daily.  If we saw a person do this and didn't know them, we would rush them to the hospital as an intentional overdose with medications like this at this dose and number, yet she does this twice a day as directed.  Sodium Bicarb - " "taken for \"acidic blood\" she has been told.   Aside from the fact that this medication will fundamentally alter the pharmacodynamics of all the other medications she is taking one at a time, it will alter all of them.  Nevermind the upset stomach she'll get from taking this many medications while chasing them all with 40oz of Dr. Pepper everyday.  Lyrica and Gabapentin at her doses is simply not indicated.  Nevermind that both of these medications are enhancers to the effects of other medications with neuroactive mechanisms with street values.  Fluoxetine, buspar, geodon, and trazadone - four medications for these conditions is not supported by any evidence whatsoever to control these conditions.  Geodon is a medication that's used for agitation/combative behaviors in the acute care setting and here we have her on it daily, at large dose, and she comes in with altered level of consciousness?  That's what this medication does.  It sedates.  Add Lyrica and Gabapentin to it.  Of course she's sluggish and now on thyroid medication, which is unlikely to be absorbed properly anyway.  Her gut edema from her profound fluid overload/total body interstitial edema will prevent absorption of these medications predictably anyway as their pharmacodynamics are tested on \"healthy\" people.  Certainly not on people taking bicarb two times daily. Benadryl and ranitidine will also alter how the stomach/duodenum function in pharmacodynamics/kiinetics individually, and she's on both. Patient desperately needs to have her PCP draw the line and start discontinuing these medications deliberately and methodically.    predniSONE (DELTASONE) 10 MG Tab Take 10 mg by mouth every day. Dmitriy Harper M.D. Not Ordered   budesonide-formoterol (SYMBICORT) 160-4.5 MCG/ACT Aerosol Inhale 2 Puffs by mouth 2 Times a Day. John Albrecht D.O. Reordered   fluoxetine (PROZAC) 40 MG capsule Take 40 mg by mouth every day. John Albrecht D.O. Reordered "   aspirin EC (ECOTRIN) 81 MG Tablet Delayed Response Take 81 mg by mouth every day. John Albrecht D.O. Reordered   busPIRone (BUSPAR) 10 MG Tab tablet Take 10 mg by mouth 2 times a day. John Albrecht D.O. Reordered   levothyroxine (SYNTHROID) 75 MCG Tab Take 75 mcg by mouth Every morning on an empty stomach. John Albrecht D.O. Reordered   pregabalin (LYRICA) 300 MG capsule Take 300 mg by mouth 2 Times a Day. John Albrecht D.O. Not Ordered   montelukast (SINGULAIR) 10 MG Tab Take 10 mg by mouth every day. John Albrecht D.O. Not Ordered   ondansetron (ZOFRAN ODT) 4 MG TABLET DISPERSIBLE Take 4 mg by mouth every 6 hours as needed for Nausea. John Albrecht D.O. Not Ordered   potassium chloride SA (KDUR) 20 MEQ Tab CR Take 20 mEq by mouth 2 times a day. John Albrecht D.O. Reordered   ziprasidone (GEODON) 80 MG Cap Take 80 mg by mouth 2 Times a Day. John Albrecht D.O. Reordered   traZODone (DESYREL) 100 MG Tab Take 100 mg by mouth every evening. John Albrecht D.O. Not Ordered   ranitidine (ZANTAC) 300 MG tablet Take 300 mg by mouth every morning. John Albrecht D.O. Reordered   diphenhydrAMINE-APAP, sleep, (TYLENOL PM EXTRA STRENGTH)  MG Tab Take 2 Tabs by mouth every evening. John Albrecht D.O. Not Ordered   Diclofenac Sodium (VOLTAREN) 1 % Gel Apply 1 Application to skin as directed 4 times a day as needed (on wrists, back, ankles, and feet). John Albrecht D.O. Not Ordered   tiotropium (SPIRIVA) 18 MCG Cap Inhale 1 Cap by mouth every day. John Albrecht D.O. Reordered   folic acid (FOLVITE) 800 MCG tablet Take 800 mg by mouth every day. John Albrecht D.O. Not Ordered   Ivabradine HCl (CORLANOR) 7.5 MG Tab Take 7.5 mg by mouth 2 Times a Day. John Albrecht D.O. Not Ordered   ipratropium-albuterol (DUONEB) 0.5-2.5 (3) MG/3ML nebulizer solution 3 mL by Nebulization route every 6 hours as needed for Shortness of Breath. John Albrecht D.O. Not Ordered   etonogestrel  (NEXPLANON) 68 MG Implant implant Inject 1 Each as instructed Once. John Albrecht D.O. Not Ordered   mycophenolate (CELLCEPT) 500 MG tablet Take 1,000 mg by mouth 2 times a day. John Albrecht D.O. Reordered   gabapentin (NEURONTIN) 300 MG Cap Take 300 mg by mouth 4 times a day. John Albrecht D.O. Not Ordered   Dulaglutide (TRULICITY) 1.5 MG/0.5ML Solution Pen-injector Inject 1.5 mg as instructed every Friday. John Albrecht D.O. Not Ordered   sodium bicarbonate 325 MG Tab Take 650 mg by mouth 2 Times a Day. John Albrecht D.O. Reordered   albuterol 108 (90 Base) MCG/ACT Aero Soln inhalation aerosol Inhale 2 Puffs by mouth every 6 hours as needed for Shortness of Breath. John Albrecht D.O. Not Ordered   Melatonin 5 MG Tab Take 10 mg by mouth every evening. John Albrecht D.O. Not Ordered   furosemide (LASIX) 80 MG Tab Take 80 mg by mouth every day. John Albrecht D.O. Reordered   methocarbamol (ROBAXIN) 750 MG Tab Take 750 mg by mouth every 4 hours. John Albrecht D.O. Not Ordered         Morbidly obese (HCC)- (present on admission)  Assessment & Plan  12/29- Patient peaked at 126kg during admission, down to 121.5 since discontinuing her Dr. Pepper and letting the lasix do what it does... patient lost 10 pounds while laying in bed doing absolutely nothing except urinating since plan started 2 days ago. Obesity may have a component of profound fluid overload from Dr. Pepper intake...  12/28 - Stable.  12/27 - Continued discussion about what we had talked about.  Further discussion about how the patient's prescribing cascade is also playing into this.  She hates that she has 5-6 different kinds of doctors and spends all of her time at doctor appointments.  Interested in discussion about beginning to taper medications in the outpatient and that it would require finding a physician willing to start this process.  Discussion that it could take 18 months in a clinic to help give her her life  "back  12/26 - Patient consumes a lot of soda.  Possibly up to a gallon/day of total fluid intake.  Known to have consumed at least 80oz of jennifer Greco in a single day while lying in bed with shaky legs.  Symptoms that will get worse as she weakens and further debilitates while laying in bed.  Patient already on lasix, discussion with patient regarding fluid volume intake, she fully agrees that she feels like she's bogged down by water instead of something similar to the behavior or butter. While she does have a bit extra fluff, likely from the sugar or artificial sweateners in the diet sodas, her \"morbid obesity\" is multi-factorial and won't be fixed with \"diet and exercise.\"  Patient is already on lasix, discussed with her to limit her fluid intake for a couple days here and see what happens.    Body mass index is 46.26 kg/m².    .    History of optic neuritis- (present on admission)  Assessment & Plan  12/24-12/29 - stable  - Previous Plans by Other Providers -  Per her report and she follows Dr. Wilkins.  12/22. Call Dr. Yoder tomorrow.    Mild intermittent asthma without complication- (present on admission)  Assessment & Plan  12/26-12/29 - Patient on 5 respiratory medications for a problem very likely exacerbated by fluid overload from daily soda intake.  Singulair also may be a neuro-active medication as well adding one more neuroactive medication to the list.  12/25 - Patient's 6-pack of 20oz dr greco is gone... might be a daily overhydration total body interstitial edema from severe soda intake component to her asthma...  12/24 - stable. Patient had a 6 pack of 20oz bottles of diet dr pepper on the table.  - Previous Plans by Other Providers -  Controlled on Symbicort, montelukast  Continue RT protocol, duo nebs, Pep therapy if warranted, and incentive spirometry.       Mastoiditis  Assessment & Plan  12/24-12/29 - stable  - Previous Plans by Other Providers -  CT head shows Stable partial " opacification of left mastoid air cells, consider effusion or component of mastoiditis as clinically appropriate  Resume augmentin.    Immunocompromised (HCC)- (present on admission)  Assessment & Plan  12/24-12/29 - stable  - Previous Plans by Other Providers -  On steroids and immunosuppressants for optic neuritis followed byu Dr. Newotn, NeuroOphthalmology    Acquired hypothyroidism- (present on admission)  Assessment & Plan  12/24-12/29 - stable  - Previous Plans by Other Providers -  Resume home dose of Synthroid  Last month her TSH was within normal limits    Type 2 diabetes mellitus with hyperglycemia, without long-term current use of insulin (HCC)- (present on admission)  Assessment & Plan  12/24-12/29 -stable  - Previous Plans by Other Providers -  Continue Insulin-sliding scale, accu-checks and hypoglycemia protocol.  Continue with Consistent Carbohydrate diet   Hold home dose of trulicity   Results from last 7 days   Lab Units 12/29/19  1723 12/29/19  1234 12/29/19  0912 12/28/19  2100 12/28/19  1621 12/28/19  1150 12/28/19  0726 12/27/19  2048   ACCU CHECK GLUCOSE 788 mg/dL 103* 110* 125* 93 111* 143* 114* 84         Depression- (present on admission)  Assessment & Plan  12/24-12/29 - stable  - Previous Plans by Other Providers -  Resume home dose of Prozac    GERD (gastroesophageal reflux disease)- (present on admission)  Assessment & Plan  12/28-12/29 - given use of concurrent bicarb and ppi...  Patient's entire absorptive pathway starting in her stomach has been completely distorted.  - Previous Plans by Other Providers -  Continue with Pepcid    Borderline personality disorder in adult (HCC)- (present on admission)  Assessment & Plan  12/29 - decreasing geodon to 40mg bid.  Patient struggles to even remain awake/wake-up in the morning after her 0600 dose of geodon.  12/24-12/28 - stable  - Previous Plans by Other Providers -  Continue with home dose of Prozac, Geodon and trazodone          Laboratory  Results from last 7 days   Lab Units 12/24/19  0505 12/23/19  0352   WBC 1501 K/uL 7.4 7.4   HGB 1503 g/dL 13.7 13.4   HCT 1504 % 42.8 40.9   PLATELET COUNT 1518 K/uL 150* 145*    Results from last 7 days   Lab Units 12/24/19  0505 12/23/19  0352   SODIUM 101 mmol/L 139 139   POTASSIUM 102 mmol/L 3.7 3.4*   CHLORIDE 103 mmol/L 107 106   CO2 104 mmol/L 20 25   ANION GAP ANION  12.0* 8.0    mg/dL 17 17   CREATININE 109 mg/dL 0.87 0.75   CALCIUM 105 mg/dL 9.0 9.0   GLUCOSE 112 mg/dL 101* 89   IF ALYSSA AMER 114757 mL/min/1.73 m 2 >60 >60   IF NON AFRICAN AMER [id number] mL/min/1.73 m 2 >60 >60             Intake/Output Summary (Last 24 hours) at 12/29/2019 0637  Last data filed at 12/28/2019 1330  Gross per 24 hour   Intake 720 ml   Output --   Net 720 ml    Vital Signs  Temp:  [36.2 °C (97.2 °F)-36.4 °C (97.5 °F)] 36.2 °C (97.2 °F)  Pulse:  [67-87] 67  Resp:  [16-20] 20  BP: (109-124)/(66-75) 124/75  SpO2:  [90 %-95 %] 95 %     Review of Systems  Review of Systems   Constitutional: Negative for fever and malaise/fatigue.   Skin: Negative for itching and rash.   Neurological: Positive for tingling, weakness and headaches.   Psychiatric/Behavioral: Negative for depression. The patient is not nervous/anxious.     Physical Exam  Physical Exam   Constitutional: She is oriented to person, place, and time. No distress.   Profoundly watery body habitus   Musculoskeletal:         General: Edema present. No deformity.   Neurological: She is alert and oriented to person, place, and time.   Skin: Skin is warm and dry. She is not diaphoretic.   Psychiatric:   Odd affect. Wants her grandmother to take care of her while she has to take care of her grand father with dementia.  She's 29 years old and expects her grandmother to be her caregiver.. not the other way around...   Nursing note and vitals reviewed.       Medications  pregabalin, 150 mg, Oral, BID  acetaZOLAMIDE SR, 500 mg, Oral, DAILY  predniSONE, 10 mg,  Oral, DAILY  OXcarbazepine, 150 mg, Oral, BID  gabapentin, 200 mg, Oral, 4X/DAY  traZODone, 50 mg, Oral, Q EVENING  modafinil, 100 mg, Oral, QAM  Ivabradine HCl, 7.5 mg, Oral, BID  enoxaparin (LOVENOX) injection, 40 mg, Subcutaneous, DAILY  aspirin EC, 81 mg, Oral, DAILY  budesonide-formoterol, 2 Puff, Inhalation, BID  busPIRone, 10 mg, Oral, BID  FLUoxetine, 40 mg, Oral, DAILY  furosemide, 80 mg, Oral, DAILY  levothyroxine, 75 mcg, Oral, AM ES  mycophenolate, 1,000 mg, Oral, BID  potassium chloride SA, 20 mEq, Oral, BID  famotidine, 20 mg, Oral, QAM  tiotropium, 1 Cap, Inhalation, DAILY  ziprasidone, 80 mg, Oral, BID  senna-docusate, 2 Tab, Oral, BID  insulin regular, 1-6 Units, Subcutaneous, 4X/DAY ACHS         Medications were reviewed today.     Imaging  DX-LUMBAR PUNCTURE FOR DIAGNOSIS   Final Result      1.  Fluoroscopic-guided lumbar puncture as described above.   2.  Opening pressure of 25 mm of water.      CT-HEAD W/O   Final Result      No acute intracranial abnormality.      DX-HIP-UNILATERAL-WITH PELVIS-1 VIEW RIGHT   Final Result      No acute osseous abnormality.      CT-HEAD W/O   Final Result         1.  No acute intracranial abnormality.   2.  Stable partial opacification of left mastoid air cells, consider effusion or component of mastoiditis as clinically appropriate      DX-CHEST-PORTABLE (1 VIEW)   Final Result      No evidence of acute cardiopulmonary process.

## 2019-12-29 NOTE — PROGRESS NOTES
Neurology Progress Note  Neurohospitalist Service, [facility]    Referring Physician: Francois Santamaria D.O.    Chief Complaint   Patient presents with   • Syncope     found down by mother, states that she last saw her 30min prior.    • Dizziness       HPI: Refer to initial documented Neurology H&P, as detailed in the patient's chart.    Interval History 12/28/19:  Contd HA    Review of systems: In addition to what is detailed in the HPI and/or updated in the interval history, all other systems reviewed and are negative.    Past Medical History:    has a past medical history of Abdominal pain, Anginal syndrome, Apnea, sleep, Arrhythmia, Arthritis, ASTHMA, Atrial fibrillation (HCC), Back pain, Borderline personality disorder (HCC), Breath shortness, Bronchitis, Cardiac arrhythmia, Chickenpox, Chronic UTI (9/18/2010), Cough, Daytime sleepiness, Depression, Diabetes (HCC), Diarrhea, Disorder of thyroid, Fall, Fatigue, Frequent headaches, Gasping for breath, Gynecological disorder, Headache(784.0), Heart burn, History of falling, Hypertension, Indigestion, Migraine, 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HCC), 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HCC), Nausea, Obesity, Pain (08-15-12), Painful joint, PCOS (polycystic ovarian syndrome), Pneumonia (2012), Psychosis (HCC), Renal disorder, Ringing in ears, Scoliosis, Shortness of breath, Sinus tachycardia (10/31/2013), Sleep apnea, Snoring, Tonsillitis, Tuberculosis, Urinary bladder disorder, Urinary incontinence, Weakness, and Wears glasses.    FHx:  family history includes Genitourinary () Problems in her sister; Heart Disease in her maternal grandmother and mother; Hypertension in her maternal grandmother, maternal uncle, and mother; No Known Problems in her sister; Other in her mother and sister; Sleep Apnea in her mother.    SHx:   reports that she has never smoked. She has never used smokeless tobacco. She reports current drug use. Frequency: 7.00 times per week.  Drugs: Marijuana and Oral. She reports that she does not drink alcohol.    Medications:    Current Facility-Administered Medications:   •  pregabalin (LYRICA) capsule 150 mg, 150 mg, Oral, BID, Francois Santamaria D.O., 150 mg at 12/28/19 1728  •  acetaZOLAMIDE SR (DIAMOX) capsule 500 mg, 500 mg, Oral, DAILY, Jose Ramires M.D., 500 mg at 12/28/19 1730  •  LORazepam (ATIVAN) injection 1 mg, 1 mg, Intramuscular, PRN, Jose Ramires M.D., 1 mg at 12/24/19 1055  •  predniSONE (DELTASONE) tablet 10 mg, 10 mg, Oral, DAILY, Dmitriy Harper M.D., 10 mg at 12/28/19 0418  •  OXcarbazepine (TRILEPTAL) tablet 150 mg, 150 mg, Oral, BID, Alexandria Sigala M.D., 150 mg at 12/28/19 1730  •  gabapentin (NEURONTIN) capsule 200 mg, 200 mg, Oral, 4X/DAY, Alexandria Sigala M.D., 200 mg at 12/28/19 1727  •  traZODone (DESYREL) tablet 50 mg, 50 mg, Oral, Q EVENING, Alexandria Sigala M.D., 50 mg at 12/28/19 1728  •  modafinil (PROVIGIL) tablet 100 mg, 100 mg, Oral, QAM, Alexandria Sigala M.D., 100 mg at 12/28/19 0419  •  ibuprofen (MOTRIN) tablet 800 mg, 800 mg, Oral, TID PRN, Dmitriy Harper M.D., 800 mg at 12/28/19 1237  •  Ivabradine HCl TABS 7.5 mg, 7.5 mg, Oral, BID, Beto Hendrix M.D., 7.5 mg at 12/28/19 1800  •  meclizine (ANTIVERT) tablet 12.5 mg, 12.5 mg, Oral, TID PRN, Beto Hendrix M.D., 12.5 mg at 12/27/19 0757  •  albuterol inhaler 2 Puff, 2 Puff, Inhalation, Q4HRS PRN, John Albrecth D.O., 2 Puff at 12/27/19 0436  •  albuterol (PROVENTIL) 2.5mg/0.5ml nebulizer solution 2.5 mg, 2.5 mg, Nebulization, Q4H PRN (RT), John Albrecht D.O., 2.5 mg at 12/18/19 1623  •  enoxaparin (LOVENOX) inj 40 mg, 40 mg, Subcutaneous, DAILY, VANIA Sanon, 40 mg at 12/28/19 0418  •  aspirin EC (ECOTRIN) tablet 81 mg, 81 mg, Oral, DAILY, John Albrecht D.O., 81 mg at 12/28/19 0418  •  budesonide-formoterol (SYMBICORT) 160-4.5 MCG/ACT inhaler 2 Puff, 2 Puff, Inhalation, BID, John  HAN Albrecht D.O., 2 Puff at 12/28/19 1727  •  busPIRone (BUSPAR) tablet 10 mg, 10 mg, Oral, BID, John Albrecht D.O., 10 mg at 12/28/19 1728  •  FLUoxetine (PROZAC) capsule 40 mg, 40 mg, Oral, DAILY, ANTONI Yoon.O., 40 mg at 12/28/19 0418  •  furosemide (LASIX) tablet 80 mg, 80 mg, Oral, DAILY, John Albrecht D.O., 80 mg at 12/28/19 0419  •  levothyroxine (SYNTHROID) tablet 75 mcg, 75 mcg, Oral, AM ES, ANTONI Yoon.O., 75 mcg at 12/28/19 0418  •  mycophenolate (CELLCEPT) capsule 1,000 mg, 1,000 mg, Oral, BID, ANTONI Yoon.O., 1,000 mg at 12/28/19 1825  •  potassium chloride SA (Kdur) tablet 20 mEq, 20 mEq, Oral, BID, John Albrecht D.O., 20 mEq at 12/28/19 1730  •  famotidine (PEPCID) tablet 20 mg, 20 mg, Oral, QAM, John Albrecht D.O., 20 mg at 12/28/19 0418  •  tiotropium (SPIRIVA) 18 MCG inhalation capsule 1 Cap, 1 Cap, Inhalation, DAILY, ANTONI Yoon.O., 1 Cap at 12/28/19 0419  •  ziprasidone (GEODON) capsule 80 mg, 80 mg, Oral, BID, John Albrecht D.O., 80 mg at 12/28/19 1728  •  senna-docusate (PERICOLACE or SENOKOT S) 8.6-50 MG per tablet 2 Tab, 2 Tab, Oral, BID, 2 Tab at 12/28/19 1727 **AND** polyethylene glycol/lytes (MIRALAX) PACKET 1 Packet, 1 Packet, Oral, QDAY PRN, 1 Packet at 12/14/19 1714 **AND** magnesium hydroxide (MILK OF MAGNESIA) suspension 30 mL, 30 mL, Oral, QDAY PRN **AND** bisacodyl (DULCOLAX) suppository 10 mg, 10 mg, Rectal, QDAY PRN, John Albrecht D.O.  •  acetaminophen (TYLENOL) tablet 650 mg, 650 mg, Oral, Q6HRS PRN, John Albrecht D.O., 650 mg at 12/28/19 1016  •  enalaprilat (VASOTEC) injection 1.25 mg, 1.25 mg, Intravenous, Q6HRS PRN, John Albrecht D.O.  •  ondansetron (ZOFRAN ODT) dispertab 4 mg, 4 mg, Oral, Q4HRS PRN, ABBY YoonOEffie, 4 mg at 12/23/19 0735  •  insulin regular (HUMULIN R) injection 1-6 Units, 1-6 Units, Subcutaneous, 4X/DAY ACHS, Stopped at 12/25/19 1100 **AND** Accu-Chek ACHS, , , Q AC AND BEDTIME(S) **AND** NOTIFY MD  and PharmD, , , Once **AND** glucose 4 g chewable tablet 16 g, 16 g, Oral, Q15 MIN PRN **AND** DEXTROSE 10% BOLUS 250 mL, 250 mL, Intravenous, Q15 MIN PRN, John Albrecht D.O.  •  acetaminophen/caffeine/butalbital 325-40-50 mg (FIORICET) -40 MG per tablet 1 Tab, 1 Tab, Oral, Q6HRS PRN, John Albrecht D.O., 1 Tab at 12/28/19 0743  •  diphenhydrAMINE (BENADRYL) tablet/capsule 25 mg, 25 mg, Oral, HS PRN, John Albrecht D.O., 25 mg at 12/26/19 1902  •  Respiratory Care per Protocol, , Nebulization, Continuous RT, John Albrecht D.O.    Physical Examination:     Vitals:    12/28/19 0400 12/28/19 0725 12/28/19 1420 12/28/19 1605   BP: 116/67 118/72  112/70   Pulse: 70 76  83   Resp: 16 16  16   Temp: 36.1 °C (97 °F) 36.3 °C (97.3 °F)  36.4 °C (97.5 °F)   TempSrc: Temporal Temporal  Temporal   SpO2: 94% 90%  94%   Weight:   121.5 kg (267 lb 13.7 oz)    Height:           General: Patient is awake and in no acute distress  Eyes: examination of optic disks not indicated at this time  CV: RRR    NEUROLOGICAL EXAM:     Mental status: Awake, alert and fully oriented, follows commands  Speech and language: speech is clear and fluent. The patient is able to name and repeat.  Cranial nerve exam: Pupils are equal, round and reactive to light bilaterally. Visual fields are full. Extraocular muscles are intact. Sensation in the face is intact to light touch. Face is symmetric. Hearing to finger rub equal. Palate elevates symmetrically. Shoulder shrug is full. Tongue is midline.  Motor exam: LE weakness Tone is normal. No abnormal movements were seen on exam.  Sensory exam: No sensory deficits identified   Deep tendon reflexes: increased bilat LE with left clonus  Coordination: no ataxia   Gait: deferred not tested    Objective Data:    Labs:  Lab Results   Component Value Date/Time    PROTHROMBTM 12.6 12/24/2019 05:05 AM    INR 0.93 12/24/2019 05:05 AM      Lab Results   Component Value Date/Time    WBC 7.4 12/24/2019  05:05 AM    WBC 6.1 07/20/2010 11:00 AM    RBC 4.59 12/24/2019 05:05 AM    RBC 4.38 07/20/2010 11:00 AM    HEMOGLOBIN 13.7 12/24/2019 05:05 AM    HEMATOCRIT 42.8 12/24/2019 05:05 AM    MCV 93.2 12/24/2019 05:05 AM    MCV 93 07/20/2010 11:00 AM    MCH 29.8 12/24/2019 05:05 AM    MCH 30.1 07/20/2010 11:00 AM    MCHC 32.0 (L) 12/24/2019 05:05 AM    MPV 11.3 12/24/2019 05:05 AM    NEUTSPOLYS 78.00 (H) 12/16/2019 05:56 AM    LYMPHOCYTES 14.80 (L) 12/16/2019 05:56 AM    MONOCYTES 4.80 12/16/2019 05:56 AM    EOSINOPHILS 1.50 12/16/2019 05:56 AM    BASOPHILS 0.40 12/16/2019 05:56 AM    ANISOCYTOSIS 1+ 07/08/2019 04:04 PM      Lab Results   Component Value Date/Time    SODIUM 139 12/24/2019 05:05 AM    POTASSIUM 3.7 12/24/2019 05:05 AM    CHLORIDE 107 12/24/2019 05:05 AM    CO2 20 12/24/2019 05:05 AM    GLUCOSE 101 (H) 12/24/2019 05:05 AM    BUN 17 12/24/2019 05:05 AM    CREATININE 0.87 12/24/2019 05:05 AM    CREATININE 0.75 (L) 07/20/2010 11:00 AM    BUNCREATRAT 19 07/20/2010 11:00 AM    GLOMRATE >59 07/20/2010 11:00 AM      Lab Results   Component Value Date/Time    CHOLSTRLTOT 150 11/08/2019 04:30 AM    LDL 70 11/08/2019 04:30 AM    HDL 50 11/08/2019 04:30 AM    TRIGLYCERIDE 149 11/08/2019 04:30 AM       Lab Results   Component Value Date/Time    ALKPHOSPHAT 54 12/16/2019 05:56 AM    ASTSGOT 14 12/16/2019 05:56 AM    ALTSGPT 30 12/16/2019 05:56 AM    TBILIRUBIN 0.8 12/16/2019 05:56 AM        Imaging/Testing:      Assessment and Plan:    Kristin Balderrama is a 29 y.o. female with relevant history of optic neuritis and LE weakness and spasticity possible demyelination with worsened vision.  LP resolved HA with some help with post procedure diamox. Has returned and didn't respond to caffeine challenge after pt abruptly stopped her significant Dr Pepper intake.  On exam sxs consistent with ON. Will rec getting outpt ONBs.  No evidence of spinal leak HA or return ofpseudotumor. Pt may need anotehr LP or possibly optic nerve  sheath fenestration depending on sxs response.  On pred Pt may need to titrate off and lose weight to help CSF ICP increase.  Plan:    The evaluation of the patient, and recommended management, was discussed with the resident staff. I have performed a physical exam and reviewed and updated ROS and Plan today (12/28/2019). In review of yesterday's note (12/27/2019), there are no changes except as documented above.    Jose Ramires MD  Board Certified Neurology, ABPN  Board Certified Vascular Neurology, ABPN  (t) [phone]

## 2019-12-29 NOTE — CARE PLAN
Problem: Safety  Goal: Will remain free from injury  Outcome: PROGRESSING AS EXPECTED  Goal: Will remain free from falls  Outcome: PROGRESSING AS EXPECTED     Problem: Respiratory:  Goal: Respiratory status will improve  Outcome: PROGRESSING AS EXPECTED     Problem: Communication  Goal: The ability to communicate needs accurately and effectively will improve  Outcome: PROGRESSING AS EXPECTED

## 2019-12-30 LAB
GLUCOSE BLD-MCNC: 125 MG/DL (ref 65–99)
GLUCOSE BLD-MCNC: 158 MG/DL (ref 65–99)
GLUCOSE BLD-MCNC: 169 MG/DL (ref 65–99)
GLUCOSE BLD-MCNC: 91 MG/DL (ref 65–99)

## 2019-12-30 PROCEDURE — 92526 ORAL FUNCTION THERAPY: CPT

## 2019-12-30 PROCEDURE — A9270 NON-COVERED ITEM OR SERVICE: HCPCS | Performed by: INTERNAL MEDICINE

## 2019-12-30 PROCEDURE — 82962 GLUCOSE BLOOD TEST: CPT | Mod: 91

## 2019-12-30 PROCEDURE — 97116 GAIT TRAINING THERAPY: CPT

## 2019-12-30 PROCEDURE — 97530 THERAPEUTIC ACTIVITIES: CPT

## 2019-12-30 PROCEDURE — A9270 NON-COVERED ITEM OR SERVICE: HCPCS | Performed by: PSYCHIATRY & NEUROLOGY

## 2019-12-30 PROCEDURE — A9270 NON-COVERED ITEM OR SERVICE: HCPCS | Performed by: STUDENT IN AN ORGANIZED HEALTH CARE EDUCATION/TRAINING PROGRAM

## 2019-12-30 PROCEDURE — 700111 HCHG RX REV CODE 636 W/ 250 OVERRIDE (IP): Performed by: NURSE PRACTITIONER

## 2019-12-30 PROCEDURE — 700102 HCHG RX REV CODE 250 W/ 637 OVERRIDE(OP): Performed by: INTERNAL MEDICINE

## 2019-12-30 PROCEDURE — 700102 HCHG RX REV CODE 250 W/ 637 OVERRIDE(OP): Performed by: STUDENT IN AN ORGANIZED HEALTH CARE EDUCATION/TRAINING PROGRAM

## 2019-12-30 PROCEDURE — 770006 HCHG ROOM/CARE - MED/SURG/GYN SEMI*

## 2019-12-30 PROCEDURE — 700111 HCHG RX REV CODE 636 W/ 250 OVERRIDE (IP): Performed by: INTERNAL MEDICINE

## 2019-12-30 PROCEDURE — 700102 HCHG RX REV CODE 250 W/ 637 OVERRIDE(OP): Performed by: PSYCHIATRY & NEUROLOGY

## 2019-12-30 PROCEDURE — 700101 HCHG RX REV CODE 250: Performed by: HOSPITALIST

## 2019-12-30 PROCEDURE — 99232 SBSQ HOSP IP/OBS MODERATE 35: CPT | Performed by: HOSPITALIST

## 2019-12-30 RX ORDER — LIDOCAINE 50 MG/G
1 PATCH TOPICAL EVERY 24 HOURS
Status: DISCONTINUED | OUTPATIENT
Start: 2019-12-30 | End: 2019-12-31 | Stop reason: HOSPADM

## 2019-12-30 RX ADMIN — PREGABALIN 150 MG: 75 CAPSULE ORAL at 05:49

## 2019-12-30 RX ADMIN — POTASSIUM CHLORIDE 20 MEQ: 20 TABLET, EXTENDED RELEASE ORAL at 05:49

## 2019-12-30 RX ADMIN — FAMOTIDINE 20 MG: 20 TABLET ORAL at 05:49

## 2019-12-30 RX ADMIN — BUDESONIDE AND FORMOTEROL FUMARATE DIHYDRATE 2 PUFF: 160; 4.5 AEROSOL RESPIRATORY (INHALATION) at 05:49

## 2019-12-30 RX ADMIN — MODAFINIL 100 MG: 100 TABLET ORAL at 05:49

## 2019-12-30 RX ADMIN — BUSPIRONE HYDROCHLORIDE 10 MG: 10 TABLET ORAL at 17:42

## 2019-12-30 RX ADMIN — INSULIN HUMAN 1 UNITS: 100 INJECTION, SOLUTION PARENTERAL at 11:05

## 2019-12-30 RX ADMIN — LEVOTHYROXINE SODIUM 75 MCG: 75 TABLET ORAL at 05:49

## 2019-12-30 RX ADMIN — ACETAMINOPHEN 650 MG: 325 TABLET, FILM COATED ORAL at 17:43

## 2019-12-30 RX ADMIN — GABAPENTIN 200 MG: 100 CAPSULE ORAL at 20:06

## 2019-12-30 RX ADMIN — PREGABALIN 150 MG: 75 CAPSULE ORAL at 17:42

## 2019-12-30 RX ADMIN — FLUOXETINE HYDROCHLORIDE 40 MG: 20 CAPSULE ORAL at 05:49

## 2019-12-30 RX ADMIN — IBUPROFEN 800 MG: 800 TABLET, FILM COATED ORAL at 20:06

## 2019-12-30 RX ADMIN — SENNOSIDES AND DOCUSATE SODIUM 2 TABLET: 8.6; 5 TABLET ORAL at 05:49

## 2019-12-30 RX ADMIN — TRAZODONE HYDROCHLORIDE 50 MG: 100 TABLET ORAL at 17:42

## 2019-12-30 RX ADMIN — MYCOPHENOLATE MOFETIL 1000 MG: 250 CAPSULE ORAL at 20:06

## 2019-12-30 RX ADMIN — ZIPRASIDONE HYDROCHLORIDE 40 MG: 40 CAPSULE ORAL at 08:58

## 2019-12-30 RX ADMIN — GABAPENTIN 200 MG: 100 CAPSULE ORAL at 17:43

## 2019-12-30 RX ADMIN — ASPIRIN 81 MG: 81 TABLET, COATED ORAL at 05:49

## 2019-12-30 RX ADMIN — OXCARBAZEPINE 150 MG: 150 TABLET, FILM COATED ORAL at 17:43

## 2019-12-30 RX ADMIN — GABAPENTIN 200 MG: 100 CAPSULE ORAL at 06:29

## 2019-12-30 RX ADMIN — BUSPIRONE HYDROCHLORIDE 10 MG: 10 TABLET ORAL at 05:49

## 2019-12-30 RX ADMIN — TIOTROPIUM BROMIDE 1 CAPSULE: 18 CAPSULE ORAL; RESPIRATORY (INHALATION) at 05:48

## 2019-12-30 RX ADMIN — PREDNISONE 10 MG: 10 TABLET ORAL at 05:49

## 2019-12-30 RX ADMIN — ALBUTEROL SULFATE 2 PUFF: 90 AEROSOL, METERED RESPIRATORY (INHALATION) at 08:03

## 2019-12-30 RX ADMIN — ENOXAPARIN SODIUM 40 MG: 100 INJECTION SUBCUTANEOUS at 05:49

## 2019-12-30 RX ADMIN — ZIPRASIDONE HYDROCHLORIDE 40 MG: 40 CAPSULE ORAL at 20:07

## 2019-12-30 RX ADMIN — SENNOSIDES AND DOCUSATE SODIUM 2 TABLET: 8.6; 5 TABLET ORAL at 17:43

## 2019-12-30 RX ADMIN — INSULIN HUMAN 1 UNITS: 100 INJECTION, SOLUTION PARENTERAL at 20:07

## 2019-12-30 RX ADMIN — BUDESONIDE AND FORMOTEROL FUMARATE DIHYDRATE 2 PUFF: 160; 4.5 AEROSOL RESPIRATORY (INHALATION) at 17:43

## 2019-12-30 RX ADMIN — POTASSIUM CHLORIDE 20 MEQ: 20 TABLET, EXTENDED RELEASE ORAL at 17:43

## 2019-12-30 RX ADMIN — OXCARBAZEPINE 150 MG: 150 TABLET, FILM COATED ORAL at 05:49

## 2019-12-30 RX ADMIN — IBUPROFEN 800 MG: 800 TABLET, FILM COATED ORAL at 12:58

## 2019-12-30 RX ADMIN — MYCOPHENOLATE MOFETIL 1000 MG: 250 CAPSULE ORAL at 06:29

## 2019-12-30 RX ADMIN — GABAPENTIN 200 MG: 100 CAPSULE ORAL at 11:09

## 2019-12-30 RX ADMIN — FUROSEMIDE 80 MG: 40 TABLET ORAL at 05:48

## 2019-12-30 RX ADMIN — LIDOCAINE 1 PATCH: 50 PATCH TOPICAL at 12:58

## 2019-12-30 RX ADMIN — ACETAZOLAMIDE 500 MG: 500 CAPSULE, EXTENDED RELEASE ORAL at 17:43

## 2019-12-30 RX ADMIN — ACETAMINOPHEN 650 MG: 325 TABLET, FILM COATED ORAL at 05:48

## 2019-12-30 ASSESSMENT — COGNITIVE AND FUNCTIONAL STATUS - GENERAL
MOBILITY SCORE: 19
CLIMB 3 TO 5 STEPS WITH RAILING: A LOT
WALKING IN HOSPITAL ROOM: A LITTLE
SUGGESTED CMS G CODE MODIFIER MOBILITY: CK
STANDING UP FROM CHAIR USING ARMS: A LITTLE
MOVING FROM LYING ON BACK TO SITTING ON SIDE OF FLAT BED: A LITTLE

## 2019-12-30 ASSESSMENT — GAIT ASSESSMENTS
DEVIATION: BRADYKINETIC;SHUFFLED GAIT;OTHER (COMMENT)
GAIT LEVEL OF ASSIST: MINIMAL ASSIST
DISTANCE (FEET): 50
ASSISTIVE DEVICE: FRONT WHEEL WALKER

## 2019-12-30 ASSESSMENT — ENCOUNTER SYMPTOMS
DEPRESSION: 0
WEAKNESS: 1
TINGLING: 1
HEADACHES: 1
NERVOUS/ANXIOUS: 0
FEVER: 0

## 2019-12-30 NOTE — PROGRESS NOTES
[facility] Medicine Daily Progress Note    Date of Service: 12/30/2019   Hospital Day: 15 Pat. Class: Inpatient     Chief Complaint   Patient presents with   • Syncope     found down by mother, states that she last saw her 30min prior.    • Dizziness    Interval Problem Update  Patient was seen and evaluated today for Syncope  Disposition: Remain IP   Hospital Course     ER Course  Patient's mother arrived home and found the patient down on the floor.  The patient had no memory of the event.  The mother did not witness any seizure activity.  Maximal amount of downtime 30 minutes.  The patient not had a fever chills or cough.  No complaints of chest pain shortness of breath or abdominal pain.  She has not had any complaints of headache neck stiffness or photophobia.  No incontinence occurred.  No other somatic    Admission Day Course  29 y.o. female who presented 12/13/2019 with altered level consciousness.  At this point in time, patient is mostly nonverbal, information obtained from her grandmother who was the one who found her.  She states whenever she left she was sleeping but just prior to this had been normal.  She states this morning she had some shortness of breath, she does have a history of asthma.  She did use breathing treatments and a home nurse helped with her breathing and it did seem to normalize.  Her grandmother left, was gone around 90 minutes, upon returning home she found her down, unconscious.  She did call 911.  She did attempt to wake up her granddaughter for quite some time and eventually she did wake up but seemed very groggy and confused.  She states she did just finish her antibiotics for her UTI/bronchitis, otherwise has been taking her usual home medications.  She has not been missing any medications nor taking extra of her medications.  I did discuss the case including labs and imaging with the ER physician.  12/14 - This morning the patient reports 8/10 frontal headache, unsteadiness,  "new blurry vision, persistent dizziness, bilateral lower extremity neuropathy, not feeling well, nausea, constipation and not being ready for discharge home. She denies shortness of breath, chest pain, dysuria. She ambulated to the bathroom with nursing supervision without incident  12/15 - Patient is complaining of having dizziness, she feels dehydrated and just overall not feeling well.  Denies any fevers or chills.  Is complaining of lower extremity spasms as well.  12/16 - Patient is still complaining of mild nausea, having back spasms which is chronic, 7/10 in severity at times. Feels like she needs more time to feel better. Denies fevers/chills, denies dysuria or diarrhea.  12/17. ON chart review: History of cellulitis in the breast. 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 Immunocompromised, on prednisone and Cellcept for optic neuritis and \"poor immune system\"; assumably she follows Dr. Newton, Neuro-Ophthalmology. History of atrial fibrillation and cardiomyopathy? On Lasix, aspirin and ivadrabine. July 2019 echo: Normal left ventricular systolic function..Left ventricular ejection fraction is visually estimated to be 65%.. Normal left ventricular wall thickness. Normal left atrial size..Trace mitral regurgitation. Structurally normal aortic valve without significant stenosis or Regurgitation. Estimated right ventricular systolic pressure  is 30 mmHg. Trace tricuspid regurgitation.. Normal right ventricular size. Also chronic pain and neuropathy. Patient fell per nursing and now mentions she has intractable R hip pain and can't walk. Ordered Hip XR and that turned out to be normal, no arthritis. Later she mentioned she choked on a piece of meat and is short of breath, She however speaks full sentences, not hypoxic, not wheezing. . She is on several pain medications and anti-inflammatories; sees Pain Clinic and Dr. Cabral, Neuroophthalmology. She mentions she is on low dose steroids prescribed to her by " "Dr. Wilkins.  12/18. Patient \"fell to knees\" again. Discussed with Dr. Sabillon in depth.  12/19. Unchanged nonspecific complaints of pain and weakness. I called dr. Chowdary, who is following her for optic neuritis. I then called Dr. Rincon, and Cande Jennings, Neurology  12/20. No issues or complaints currently. Grandmother at bedside with questions.. SW/Palliative asking about competency eval as patient desires to be DNR.. For now I tony er Speech for cognitive eval. Psych has been involved so may need formal neurocognitive testing for competency  12/21. No new issues or concerns when I saw her. Later nurse reports she is having \"vision problems\" similar to optic neuritis flare. Of note, she is on high dose prednisone.   12/22. She feels she has \"eye sypmtoms\" and paresthesias. Her grandmother worried about her eating that she is not getting enough because she needs solid foods. She is on a pureed diet.   12/23 - Patient is still complaining of mild nausea, having back spasms which is chronic, 7/10 in severity at times. Feels like she needs more time to feel better. Denies fevers/chills, denies dysuria or diarrhea  12/24 - Patient waiting to be transferred to nursing home to have someone take care of her.. no new developments. Patient consumes excessive amount of diet dr aguilar.  12/25 - Awaiting placement.  Patient awaiting rehab.  Neurology concerned this is very much a poly-pharmacy problem, to which I am in complete agreement.  Will gently address concerns about this with the patient tomorrow.  12/26 - Patient very amenable to discussion today about how the prescribing cascade has negatively affected her life.  Awaiting placement, but ultimately she needs a taper plan.  A skilled nursing facility with a physician willing to start it may be the most appropriate place to do this.  12/27 - Caffeine withdrawal headache.  Patient expressed her gratitude to me for caring.  Patient's grandmother got rid of all " the Dr. Pepper.  Patient sitting upright in the bed today for the first time since I have been seeing her.  12/28 - Neurology is calling this Trigeminal Neuralgia and is clearing patient to discharge.  Agree.  Placement now cleared.  Changed lyrica from 300 bid to 150 bid.  12/29 - decreasing geodon to 40mg bid. Patient can hardly get out of bed at 0930 and needs a nap... 0600 dosing of geodon perhaps causing her to be sleepy and difficulty with ambulation depending on when PT/OT evaluate patient.  12/30: Patient still complains about significant months of back pain.  I added lidocaine and ibuprofen.  The patient is being evaluated for optic nerve fenestration and Diamox will be continued.  Code Status: DNAR/DNI  Consultants/Specialty:  Psych, Neurology, Palliative Care Procedures During Hospitalization:  None   Lines, Drains, Airways, Wounds  Lines:  Drains:  Airway:  Wounds: VTE prophylaxis: Lovenox        Diet Order Diabetic    Assessment/Plan  * Altered level of consciousness- (present on admission)  Assessment & Plan  Highly suspect this is secondary to polypharmacy, patient is on a lot of sedating medications. She does have a history of accidental overdose in the past but denies this. No seizure activity noted, no evidence of rhabdomyolysis. Currently mentation has resolved, is AAOx4 past 2 days. He had a long discussion in terms of decreasing her sedating medications.  12/17. No changes in her pain regimen and sedatives. Follow up to her Pain Clinic.  12/18. Discussed with Dr. Sabillon. Not very clear diagnoses but managed for functional transverse myelitis, possible optic neuritis, seen by Dr. Newton. Spine stimulator limits further evaluation for other causes of enceiphalopathy/weakness/falls such as MRIs. Taper of gabapentin and transition to Trileptal. Modenalfil also started. Monitor her symptoms of weakness causing her to fall  12/19. May have a functional component but Dr. Chowdary feels there  "could be neurologic disease. He recommended formal neurology consult to r/o transverse myelitis or CNS lesions and see if it is acceptable to get a CT myelogram since MRI is contraindicated due to her gastric pacer/stimulator, Discussed with Neurology. Has had CT myelogram in the past per their records and seen Dr. Fox. Has been nondiagnostic. Currently no other possible myelopathy diagnosis. Symptomatology also inconsistent as she has reflexes. She has been worked up for myopathies; on Dr. Fox's notes in the past, muscle biopsy was nondiagnostic.  In the past IV Solumedrol was given and per Dr. Fox, shpowed some benefit for a diagnosis of \"paralysis\" and \"lower motor neuron etiology\". 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 This was 2 years ago however and recently she has been experiencing weakness and falls again. Currently agree that no need for CT myelogram right now, will evaluate if high dose steroids will be of any benefit otherwise she will have to follow-up with Dr. oFx. Ordered 3d course of Solu-medrol to see if there is any benefit, as Neurology recommended no harm to try what worked in the past.  12/20. Discussed plan with grandmother and patient. Will do the steroid trial. Poor IV access so switch to prednisone 60x 1 followe dby pred 10 as per Dr. Chowdary's outpatient regimen for optic neuritis NOS. 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 Inpatient Neurology has no further comment and recommnd following Dr. Fox, neurology for further management. Meanwhile grandmother admits she cannot care for her 24/7 so she is awaiting SNF/rehab/ SW working on it.  12/22. Ordered supplements. Dietary and Speech consults  12/23. Consulted Dr. Ramires, neurology for second opinion. He is concerned for opportunistic disease of the spine as she " is on immunosuppressants. She has a history of back surgery, obesity and she feels she needs anesthesia (does not want bedside LP) therefore ordered head CT followed by IR guided LP. Ordered encephalitis panel. Left message to Dr. Chowdary who isn't in the office today/vacation.  12/24 - Lumbar puncture today.  Patient's mentation is quite improved.  States she needs to have her grandmother be her caregiver.  Her grandmother is struggling to take care of her grandfather with dementia.  Patient wants to go to a rehab/nursing facility.  Agree with potential for this to be polypharmacy.  Neuro pending  12/25 - LP complete - Opening pressures of 25, neuro started acetazolamide. Symptomatically, ALOC is considered resolved.  12/26-12/29 - Resolved, although given patient's extent of polypharmacy from appropriate use of prescribed medications (prescribing cascade) I'm not even sure she knows what an unaltered level of consciousness would be like.    Advanced care planning/counseling discussion- (present on admission)  Assessment & Plan  12/20. Patient has indicated to Palliative that she wants to be DNR. Currently she does not have a diagnosis to say that she is terminal. She also has been followed by Psychiatry and has had altered mentation in the past. I explained to Palliative and SW that a formal neuro cognitive eval needs to be done for competency as I can only assess capacity. SLP to do cognitive eval and will let me know if formal cognitive eval needed, and if so I will consult Psychology.  12/21. Speech for cognitive eval, and if they recommend formal testing for competency will have Psychology evaluate., By definition: Competency is a global assessment and legal determination made by a  in court. Capacity is a functional assessment and a clinical determination about a specific decision that can be made by any clinician familiar with a patient’s case. Hospitalists frequently encounter situations in which a  "patient’s capacity is called into question; in most cases, this is a determination a hospitalist can make independent of consultants.  12/23. Awaiting SLP for cognitive eval. Reconsult Psych to assess if there is any behavioral component to affect her competency. Psychology consulted; depends on SLP and Psychiatry. If cognitive eval normal and no behaviporal issue/depression or suicidality from the aove evaluating services then DNR/DNI POLST and documentation for competency can be signed.  12/24-12/29 - Stable for now.  Patient really struggling more with self-defeating and choices of continuing to be victim to her past hurts.  Palliative care states patient desires to be a DNR.      Polypharmacy- (present on admission)  Assessment & Plan  12/29 - patient complained of increased back pain, but patent is quite non-ambulatory and chemically bed ridden.  Wants to keep new dose of Lyrica.  Notes that she wanted to take a nap (at 0930) in the morning, reminded her she just took a nap , called night time, states she had been given a medication to help her stay awake.  This seems ridiculous given she has been prescribed Geodon, which has powerful sedative properties twice per day, it's no wonder she has such a Dr. Pepper habit to even function. Discussion with pharmacy.  Decreasing Geodon to 40mg bid.  Recommendations would be to start to taper medication after 4 weeks of no effect from medication. Patient's been in the hospital two weeks and can hardly walk.  Safe to say this medication isn't having a desirable effect.  12/28 - Lyrica decreased from 300 bid to 150mg bid today.  12/27 - While inpatient patient had modafinil, trileptal meclizine and acetazolamide added. Bicarb discontinued today.  12/26 - This is polypharmacy and a prescribing cascade.  Home med-list is an absurdity.  Home med list does not include her \"medical marijuana.\" Patient takes a hand full of pills two time daily.  If we saw a person do this and " "didn't know them, we would rush them to the hospital as an intentional overdose with medications like this at this dose and number, yet she does this twice a day as directed.  Sodium Bicarb - taken for \"acidic blood\" she has been told.   Aside from the fact that this medication will fundamentally alter the pharmacodynamics of all the other medications she is taking one at a time, it will alter all of them.  Nevermind the upset stomach she'll get from taking this many medications while chasing them all with 40oz of Dr. Pepper everyday.  Lyrica and Gabapentin at her doses is simply not indicated.  Nevermind that both of these medications are enhancers to the effects of other medications with neuroactive mechanisms with street values.  Fluoxetine, buspar, geodon, and trazadone - four medications for these conditions is not supported by any evidence whatsoever to control these conditions.  Geodon is a medication that's used for agitation/combative behaviors in the acute care setting and here we have her on it daily, at large dose, and she comes in with altered level of consciousness?  That's what this medication does.  It sedates.  Add Lyrica and Gabapentin to it.  Of course she's sluggish and now on thyroid medication, which is unlikely to be absorbed properly anyway.  Her gut edema from her profound fluid overload/total body interstitial edema will prevent absorption of these medications predictably anyway as their pharmacodynamics are tested on \"healthy\" people.  Certainly not on people taking bicarb two times daily. Benadryl and ranitidine will also alter how the stomach/duodenum function in pharmacodynamics/kiinetics individually, and she's on both. Patient desperately needs to have her PCP draw the line and start discontinuing these medications deliberately and methodically.    predniSONE (DELTASONE) 10 MG Tab Take 10 mg by mouth every day. Dmitriy Harper M.D. Not Ordered   budesonide-formoterol (SYMBICORT) " 160-4.5 MCG/ACT Aerosol Inhale 2 Puffs by mouth 2 Times a Day. John Albrecht D.O. Reordered   fluoxetine (PROZAC) 40 MG capsule Take 40 mg by mouth every day. John Albrecht D.O. Reordered   aspirin EC (ECOTRIN) 81 MG Tablet Delayed Response Take 81 mg by mouth every day. John Albrecht D.O. Reordered   busPIRone (BUSPAR) 10 MG Tab tablet Take 10 mg by mouth 2 times a day. John Albrecht D.O. Reordered   levothyroxine (SYNTHROID) 75 MCG Tab Take 75 mcg by mouth Every morning on an empty stomach. John Albrecht D.O. Reordered   pregabalin (LYRICA) 300 MG capsule Take 300 mg by mouth 2 Times a Day. John Albrecht D.O. Not Ordered   montelukast (SINGULAIR) 10 MG Tab Take 10 mg by mouth every day. John Albrecht D.O. Not Ordered   ondansetron (ZOFRAN ODT) 4 MG TABLET DISPERSIBLE Take 4 mg by mouth every 6 hours as needed for Nausea. John Albrecht D.O. Not Ordered   potassium chloride SA (KDUR) 20 MEQ Tab CR Take 20 mEq by mouth 2 times a day. John Albrecht D.O. Reordered   ziprasidone (GEODON) 80 MG Cap Take 80 mg by mouth 2 Times a Day. John Albrecht D.O. Reordered   traZODone (DESYREL) 100 MG Tab Take 100 mg by mouth every evening. John Albrecht D.O. Not Ordered   ranitidine (ZANTAC) 300 MG tablet Take 300 mg by mouth every morning. John Albrecht D.O. Reordered   diphenhydrAMINE-APAP, sleep, (TYLENOL PM EXTRA STRENGTH)  MG Tab Take 2 Tabs by mouth every evening. John Albrecht D.O. Not Ordered   Diclofenac Sodium (VOLTAREN) 1 % Gel Apply 1 Application to skin as directed 4 times a day as needed (on wrists, back, ankles, and feet). John Albrecht D.O. Not Ordered   tiotropium (SPIRIVA) 18 MCG Cap Inhale 1 Cap by mouth every day. John Albrecht D.O. Reordered   folic acid (FOLVITE) 800 MCG tablet Take 800 mg by mouth every day. John Albrecht D.O. Not Ordered   Ivabradine HCl (CORLANOR) 7.5 MG Tab Take 7.5 mg by mouth 2 Times a Day. John Albrecht D.O. Not Ordered    ipratropium-albuterol (DUONEB) 0.5-2.5 (3) MG/3ML nebulizer solution 3 mL by Nebulization route every 6 hours as needed for Shortness of Breath. John Albrecht D.O. Not Ordered   etonogestrel (NEXPLANON) 68 MG Implant implant Inject 1 Each as instructed Once. John Albrecht D.O. Not Ordered   mycophenolate (CELLCEPT) 500 MG tablet Take 1,000 mg by mouth 2 times a day. John Albrecht D.O. Reordered   gabapentin (NEURONTIN) 300 MG Cap Take 300 mg by mouth 4 times a day. John Albrecht D.O. Not Ordered   Dulaglutide (TRULICITY) 1.5 MG/0.5ML Solution Pen-injector Inject 1.5 mg as instructed every Friday. John Albrecht D.O. Not Ordered   sodium bicarbonate 325 MG Tab Take 650 mg by mouth 2 Times a Day. John Albrecht D.O. Reordered   albuterol 108 (90 Base) MCG/ACT Aero Soln inhalation aerosol Inhale 2 Puffs by mouth every 6 hours as needed for Shortness of Breath. John Albrecht D.O. Not Ordered   Melatonin 5 MG Tab Take 10 mg by mouth every evening. John Albrecht D.O. Not Ordered   furosemide (LASIX) 80 MG Tab Take 80 mg by mouth every day. John Albrecht D.O. Reordered   methocarbamol (ROBAXIN) 750 MG Tab Take 750 mg by mouth every 4 hours. John Albrecht D.O. Not Ordered         Morbidly obese (HCC)- (present on admission)  Assessment & Plan  12/29- Patient peaked at 126kg during admission, down to 121.5 since discontinuing her Dr. Pepper and letting the lasix do what it does... patient lost 10 pounds while laying in bed doing absolutely nothing except urinating since plan started 2 days ago. Obesity may have a component of profound fluid overload from Dr. Pepper intake...  12/28 - Stable.  12/27 - Continued discussion about what we had talked about.  Further discussion about how the patient's prescribing cascade is also playing into this.  She hates that she has 5-6 different kinds of doctors and spends all of her time at doctor appointments.  Interested in discussion about beginning to taper  "medications in the outpatient and that it would require finding a physician willing to start this process.  Discussion that it could take 18 months in a clinic to help give her her life back  12/26 - Patient consumes a lot of soda.  Possibly up to a gallon/day of total fluid intake.  Known to have consumed at least 80oz of jennifer Greco in a single day while lying in bed with shaky legs.  Symptoms that will get worse as she weakens and further debilitates while laying in bed.  Patient already on lasix, discussion with patient regarding fluid volume intake, she fully agrees that she feels like she's bogged down by water instead of something similar to the behavior or butter. While she does have a bit extra fluff, likely from the sugar or artificial sweateners in the diet sodas, her \"morbid obesity\" is multi-factorial and won't be fixed with \"diet and exercise.\"  Patient is already on lasix, discussed with her to limit her fluid intake for a couple days here and see what happens.    Body mass index is 46.26 kg/m².    .    History of optic neuritis- (present on admission)  Assessment & Plan  12/24-12/29 - stable  - Previous Plans by Other Providers -  Per her report and she follows Dr. Wilkins.  12/22. Call Dr. Yoder tomorrow.    Mild intermittent asthma without complication- (present on admission)  Assessment & Plan  12/26-12/29 - Patient on 5 respiratory medications for a problem very likely exacerbated by fluid overload from daily soda intake.  Singulair also may be a neuro-active medication as well adding one more neuroactive medication to the list.  12/25 - Patient's 6-pack of 20oz dr greco is gone... might be a daily overhydration total body interstitial edema from severe soda intake component to her asthma...  12/24 - stable. Patient had a 6 pack of 20oz bottles of diet dr pepper on the table.  - Previous Plans by Other Providers -  Controlled on Symbicort, montelukast  Continue RT protocol, duo nebs, Pep " therapy if warranted, and incentive spirometry.       Mastoiditis  Assessment & Plan  12/24-12/29 - stable  - Previous Plans by Other Providers -  CT head shows Stable partial opacification of left mastoid air cells, consider effusion or component of mastoiditis as clinically appropriate  Resume augmentin.    Immunocompromised (HCC)- (present on admission)  Assessment & Plan  12/24-12/29 - stable  - Previous Plans by Other Providers -  On steroids and immunosuppressants for optic neuritis followed byu Dr. Newton, NeuroOphthalmology    Acquired hypothyroidism- (present on admission)  Assessment & Plan  12/24-12/29 - stable  - Previous Plans by Other Providers -  Resume home dose of Synthroid  Last month her TSH was within normal limits    Type 2 diabetes mellitus with hyperglycemia, without long-term current use of insulin (HCC)- (present on admission)  Assessment & Plan  12/24-12/29 -stable  - Previous Plans by Other Providers -  Continue Insulin-sliding scale, accu-checks and hypoglycemia protocol.  Continue with Consistent Carbohydrate diet   Hold home dose of trulicity   Results from last 7 days   Lab Units 12/29/19  1723 12/29/19  1234 12/29/19  0912 12/28/19  2100 12/28/19  1621 12/28/19  1150 12/28/19  0726 12/27/19  2048   ACCU CHECK GLUCOSE 788 mg/dL 103* 110* 125* 93 111* 143* 114* 84         Depression- (present on admission)  Assessment & Plan  12/24-12/29 - stable  - Previous Plans by Other Providers -  Resume home dose of Prozac    GERD (gastroesophageal reflux disease)- (present on admission)  Assessment & Plan  12/28-12/29 - given use of concurrent bicarb and ppi...  Patient's entire absorptive pathway starting in her stomach has been completely distorted.  - Previous Plans by Other Providers -  Continue with Pepcid    Borderline personality disorder in adult (HCC)- (present on admission)  Assessment & Plan  12/29 - decreasing geodon to 40mg bid.  Patient struggles to even remain awake/wake-up in  the morning after her 0600 dose of geodon.  12/24-12/28 - stable  - Previous Plans by Other Providers -  Continue with home dose of Prozac, Geodon and trazodone         Laboratory  Results from last 7 days   Lab Units 12/24/19  0505   WBC 1501 K/uL 7.4   HGB 1503 g/dL 13.7   HCT 1504 % 42.8   PLATELET COUNT 1518 K/uL 150*    Results from last 7 days   Lab Units 12/24/19  0505   SODIUM 101 mmol/L 139   POTASSIUM 102 mmol/L 3.7   CHLORIDE 103 mmol/L 107   CO2 104 mmol/L 20   ANION GAP ANION  12.0*    mg/dL 17   CREATININE 109 mg/dL 0.87   CALCIUM 105 mg/dL 9.0   GLUCOSE 112 mg/dL 101*   IF ALYSSA AMER [id number] mL/min/1.73 m 2 >60   IF NON  AMER 109GFRB mL/min/1.73 m 2 >60             Intake/Output Summary (Last 24 hours) at 12/30/2019 1524  Last data filed at 12/30/2019 1000  Gross per 24 hour   Intake 440 ml   Output --   Net 440 ml    Vital Signs  Temp:  [36.1 °C (97 °F)-36.8 °C (98.2 °F)] 36.3 °C (97.4 °F)  Pulse:  [73-83] 79  Resp:  [16-19] 16  BP: (109-129)/(44-79) 121/58  SpO2:  [94 %-97 %] 97 %     Review of Systems  Review of Systems   Constitutional: Negative for fever and malaise/fatigue.   Skin: Negative for itching and rash.   Neurological: Positive for tingling, weakness and headaches.   Psychiatric/Behavioral: Negative for depression. The patient is not nervous/anxious.     Physical Exam  Physical Exam   Constitutional: She is oriented to person, place, and time. No distress.   Profoundly watery body habitus   Musculoskeletal:         General: Edema present. No deformity.   Neurological: She is alert and oriented to person, place, and time.   Skin: Skin is warm and dry. She is not diaphoretic.   Psychiatric:   Odd affect. Wants her grandmother to take care of her while she has to take care of her grand father with dementia.  She's 29 years old and expects her grandmother to be her caregiver.. not the other way around...   Nursing note and vitals reviewed.       Medications  lidocaine, 1  Patch, Transdermal, Q24HR  ziprasidone, 40 mg, Oral, BID  pregabalin, 150 mg, Oral, BID  acetaZOLAMIDE SR, 500 mg, Oral, DAILY  predniSONE, 10 mg, Oral, DAILY  OXcarbazepine, 150 mg, Oral, BID  gabapentin, 200 mg, Oral, 4X/DAY  traZODone, 50 mg, Oral, Q EVENING  modafinil, 100 mg, Oral, QAM  Ivabradine HCl, 7.5 mg, Oral, BID  enoxaparin (LOVENOX) injection, 40 mg, Subcutaneous, DAILY  aspirin EC, 81 mg, Oral, DAILY  budesonide-formoterol, 2 Puff, Inhalation, BID  busPIRone, 10 mg, Oral, BID  FLUoxetine, 40 mg, Oral, DAILY  furosemide, 80 mg, Oral, DAILY  levothyroxine, 75 mcg, Oral, AM ES  mycophenolate, 1,000 mg, Oral, BID  potassium chloride SA, 20 mEq, Oral, BID  famotidine, 20 mg, Oral, QAM  tiotropium, 1 Cap, Inhalation, DAILY  senna-docusate, 2 Tab, Oral, BID  insulin regular, 1-6 Units, Subcutaneous, 4X/DAY ACHS         Medications were reviewed today.     Imaging  DX-LUMBAR PUNCTURE FOR DIAGNOSIS   Final Result      1.  Fluoroscopic-guided lumbar puncture as described above.   2.  Opening pressure of 25 mm of water.      CT-HEAD W/O   Final Result      No acute intracranial abnormality.      DX-HIP-UNILATERAL-WITH PELVIS-1 VIEW RIGHT   Final Result      No acute osseous abnormality.      CT-HEAD W/O   Final Result         1.  No acute intracranial abnormality.   2.  Stable partial opacification of left mastoid air cells, consider effusion or component of mastoiditis as clinically appropriate      DX-CHEST-PORTABLE (1 VIEW)   Final Result      No evidence of acute cardiopulmonary process.

## 2019-12-30 NOTE — CARE PLAN
Patient free of falls and fall precautions in place. Patient verbalize understanding of illness process and POC discussed.          Problem: Safety  Goal: Will remain free from injury  Outcome: PROGRESSING AS EXPECTED  Goal: Will remain free from falls  Outcome: PROGRESSING AS EXPECTED     Problem: Knowledge Deficit  Goal: Knowledge of disease process/condition, treatment plan, diagnostic tests, and medications will improve  Outcome: PROGRESSING AS EXPECTED

## 2019-12-30 NOTE — DISCHARGE PLANNING
Anticipated Discharge Disposition:   [facility] Bed / Private Room requested    Action:   RN CM followed up on choice form by adding [facility] bed and private room.      Barriers to Discharge:   Acceptance to SNF      Plan:   RN CM to follow up with YOLANDE Davidson for acceptance to [facility]; patient and RN CM to meet concerning discharge planning:  Home Health after SNF and counseling.  RN CM to provide community resources as well as places for counseling.

## 2019-12-30 NOTE — DISCHARGE PLANNING
Received Choice form at 1018  Agency/Facility Name: [facility]  Referral sent per Choice form @ 1592

## 2019-12-30 NOTE — THERAPY
"Physical Therapy Treatment completed.   Bed Mobility:  Supine to Sit: Supervised  Transfers: Sit to Stand: Supervised  Gait: Level Of Assist: Minimal Assist with Front-Wheel Walker       Plan of Care: Will benefit from Physical Therapy 3 times per week  Discharge Recommendations: Equipment: Will Continue to Assess for Equipment Needs. Recommend post-acute placement for continued physical therapy services prior to discharge home. Patient can tolerate post-acute therapies at a 5x/week frequency.       See \"Rehab Therapy-Acute\" Patient Summary Report for complete documentation.     Pt was seen for therapy treatment today and was able to demonstrate with improved activity tolerance and ambulation distance today. However, pt continues to have poor gait mechanics and continues to be a high risk for falling. Pt demonstrates with frequent tremoulous activity during ambulation and requires frequent cues for correcting mechanics. Pt appears to improve gait mechanics when distracted and able to converse with therapist. Presenets with frequent LOB towards R. Pt able to self correct at times and requires intermittent assist from thearpist to maintain upright posture. Pt was able to ambulate a total of 150ft with frequent rest break and w/c follow due to hx of frequent falls. Pt will continue to benefit from skilled PT while in house, with recommendation for post acute thearpy prior to d/c home.     "

## 2019-12-30 NOTE — THERAPY
"Speech Language Therapy dysphagia treatment completed.   Functional Status: Patient currently on Dys2/thin liquid diet and per RN, patient appears to be tolerating diet without difficulty. Patient reports same. Patient awake, alert, and up in a chair finishing Dys2/thin liquid lunch meal. Patient agreeable to further trials with this SLP. Patient consumed PO trials of soft solids, mixed consistencies, and thin liquids via cup sip and straw. Patient consumed all PO trials independently and with no s/sx of aspiration. Patient had improvements in following swallow precautions for small bites/sips and utilizing slow feeding rate and was encouraged to continue and educated on importance of same. Patient verbalized understanding of all education. Vocal quality remained clear and laryngeal elevation palpated as complete. Patient reporting \"fear of choking on breads\" and other higher level consistencies when asked and thus, crackers were not tested. At this time, recommend patient upgrade to Dys3/thin liquid diet. Okay to give meds whole w/ thin liquid wash. Straws okay. RN aware. SLP is following.     Recommendations: At this time, recommend patient upgrade to Dys3/thin liquid diet. Okay to give meds whole w/ thin liquid wash. Straws okay.   Plan of Care: Will benefit from Speech Therapy 3 times per week during acute stay   Post-Acute Therapy: Recommend outpatient or home health transitional care services for continued speech therapy services.    See \"Rehab Therapy-Acute\" Patient Summary Report for complete documentation.     "

## 2019-12-30 NOTE — DISCHARGE PLANNING
Agency/Facility Name: [facility]  Spoke To: Tatyana  Outcome: No private rooms available. Yanci(SONYA CM) notified. Tatyana verifying [facility] bed availability.

## 2019-12-30 NOTE — PROGRESS NOTES
Brief Neurology Progress Note    Please refer to original neurology consultation note from 12/19/19 for neurology recommendations/plan.     Ok to continue Diamox as was recommended by Dr. Ramires.     Please follow up with Dr. Fox and Dr. Yousif outpatient upon discharge for further management.    The above has been discussed with Dr. Paredes.   PER Maynard.

## 2019-12-30 NOTE — CARE PLAN
Problem: Safety  Goal: Will remain free from injury  Outcome: PROGRESSING AS EXPECTED  Goal: Will remain free from falls  Outcome: PROGRESSING AS EXPECTED    Non slip socks on, bed alarm on, hourly rounding in place     Problem: Infection  Goal: Will remain free from infection  Outcome: PROGRESSING AS EXPECTED

## 2019-12-31 VITALS
HEIGHT: 65 IN | SYSTOLIC BLOOD PRESSURE: 112 MMHG | RESPIRATION RATE: 16 BRPM | TEMPERATURE: 97 F | OXYGEN SATURATION: 97 % | BODY MASS INDEX: 44.63 KG/M2 | WEIGHT: 267.86 LBS | HEART RATE: 77 BPM | DIASTOLIC BLOOD PRESSURE: 70 MMHG

## 2019-12-31 PROBLEM — R40.4 ALTERED LEVEL OF CONSCIOUSNESS: Status: RESOLVED | Noted: 2019-12-14 | Resolved: 2019-12-31

## 2019-12-31 LAB
GLUCOSE BLD-MCNC: 109 MG/DL (ref 65–99)
GLUCOSE BLD-MCNC: 121 MG/DL (ref 65–99)

## 2019-12-31 PROCEDURE — A9270 NON-COVERED ITEM OR SERVICE: HCPCS | Performed by: STUDENT IN AN ORGANIZED HEALTH CARE EDUCATION/TRAINING PROGRAM

## 2019-12-31 PROCEDURE — 700111 HCHG RX REV CODE 636 W/ 250 OVERRIDE (IP): Performed by: INTERNAL MEDICINE

## 2019-12-31 PROCEDURE — 700102 HCHG RX REV CODE 250 W/ 637 OVERRIDE(OP): Performed by: STUDENT IN AN ORGANIZED HEALTH CARE EDUCATION/TRAINING PROGRAM

## 2019-12-31 PROCEDURE — A9270 NON-COVERED ITEM OR SERVICE: HCPCS | Performed by: INTERNAL MEDICINE

## 2019-12-31 PROCEDURE — A9270 NON-COVERED ITEM OR SERVICE: HCPCS | Performed by: PSYCHIATRY & NEUROLOGY

## 2019-12-31 PROCEDURE — 700102 HCHG RX REV CODE 250 W/ 637 OVERRIDE(OP): Performed by: INTERNAL MEDICINE

## 2019-12-31 PROCEDURE — 700102 HCHG RX REV CODE 250 W/ 637 OVERRIDE(OP): Performed by: PSYCHIATRY & NEUROLOGY

## 2019-12-31 PROCEDURE — 700111 HCHG RX REV CODE 636 W/ 250 OVERRIDE (IP): Performed by: NURSE PRACTITIONER

## 2019-12-31 PROCEDURE — 700101 HCHG RX REV CODE 250: Performed by: HOSPITALIST

## 2019-12-31 PROCEDURE — 82962 GLUCOSE BLOOD TEST: CPT

## 2019-12-31 PROCEDURE — 99239 HOSP IP/OBS DSCHRG MGMT >30: CPT | Performed by: HOSPITALIST

## 2019-12-31 RX ORDER — PREGABALIN 150 MG/1
150 CAPSULE ORAL 2 TIMES DAILY
Qty: 60 CAP | Refills: 0 | Status: SHIPPED | OUTPATIENT
Start: 2019-12-31 | End: 2020-01-30

## 2019-12-31 RX ORDER — TRAZODONE HYDROCHLORIDE 100 MG/1
50 TABLET ORAL EVERY EVENING
Qty: 30 TAB | Refills: 3
Start: 2019-12-31 | End: 2020-07-05

## 2019-12-31 RX ORDER — GABAPENTIN 100 MG/1
100 CAPSULE ORAL 4 TIMES DAILY
Start: 2019-12-31 | End: 2020-02-24

## 2019-12-31 RX ORDER — MODAFINIL 100 MG/1
100 TABLET ORAL EVERY MORNING
Qty: 30 TAB | Refills: 0 | Status: SHIPPED | OUTPATIENT
Start: 2020-01-01 | End: 2020-01-31

## 2019-12-31 RX ORDER — LIDOCAINE 50 MG/G
1 PATCH TOPICAL EVERY 24 HOURS
Qty: 10 PATCH
Start: 2020-01-01 | End: 2020-02-24

## 2019-12-31 RX ORDER — ACETAZOLAMIDE 500 MG/1
500 CAPSULE, EXTENDED RELEASE ORAL DAILY
Qty: 60 CAP
Start: 2019-12-31 | End: 2020-02-06 | Stop reason: SDUPTHER

## 2019-12-31 RX ORDER — PREGABALIN 150 MG/1
150 CAPSULE ORAL 2 TIMES DAILY
Qty: 60 CAP | Refills: 0
Start: 2019-12-31 | End: 2019-12-31 | Stop reason: SDUPTHER

## 2019-12-31 RX ORDER — AMOXICILLIN 250 MG
2 CAPSULE ORAL 2 TIMES DAILY PRN
Start: 2019-12-31 | End: 2020-04-20

## 2019-12-31 RX ORDER — ZIPRASIDONE HYDROCHLORIDE 40 MG/1
40 CAPSULE ORAL 2 TIMES DAILY
Start: 2019-12-31 | End: 2020-01-29 | Stop reason: SDUPTHER

## 2019-12-31 RX ORDER — OXCARBAZEPINE 150 MG/1
150 TABLET, FILM COATED ORAL 2 TIMES DAILY
Qty: 60 TAB
Start: 2019-12-31 | End: 2020-02-06 | Stop reason: SDUPTHER

## 2019-12-31 RX ADMIN — PREGABALIN 150 MG: 75 CAPSULE ORAL at 04:43

## 2019-12-31 RX ADMIN — IBUPROFEN 800 MG: 800 TABLET, FILM COATED ORAL at 09:46

## 2019-12-31 RX ADMIN — LEVOTHYROXINE SODIUM 75 MCG: 75 TABLET ORAL at 04:43

## 2019-12-31 RX ADMIN — OXCARBAZEPINE 150 MG: 150 TABLET, FILM COATED ORAL at 04:43

## 2019-12-31 RX ADMIN — MODAFINIL 100 MG: 100 TABLET ORAL at 04:43

## 2019-12-31 RX ADMIN — FAMOTIDINE 20 MG: 20 TABLET ORAL at 04:43

## 2019-12-31 RX ADMIN — TIOTROPIUM BROMIDE 1 CAPSULE: 18 CAPSULE ORAL; RESPIRATORY (INHALATION) at 04:43

## 2019-12-31 RX ADMIN — FLUOXETINE HYDROCHLORIDE 40 MG: 20 CAPSULE ORAL at 04:43

## 2019-12-31 RX ADMIN — MYCOPHENOLATE MOFETIL 1000 MG: 250 CAPSULE ORAL at 04:43

## 2019-12-31 RX ADMIN — POTASSIUM CHLORIDE 20 MEQ: 20 TABLET, EXTENDED RELEASE ORAL at 04:43

## 2019-12-31 RX ADMIN — BUDESONIDE AND FORMOTEROL FUMARATE DIHYDRATE 2 PUFF: 160; 4.5 AEROSOL RESPIRATORY (INHALATION) at 04:44

## 2019-12-31 RX ADMIN — BUSPIRONE HYDROCHLORIDE 10 MG: 10 TABLET ORAL at 04:43

## 2019-12-31 RX ADMIN — PREDNISONE 10 MG: 10 TABLET ORAL at 04:43

## 2019-12-31 RX ADMIN — ASPIRIN 81 MG: 81 TABLET, COATED ORAL at 04:43

## 2019-12-31 RX ADMIN — ACETAMINOPHEN 650 MG: 325 TABLET, FILM COATED ORAL at 04:43

## 2019-12-31 RX ADMIN — GABAPENTIN 200 MG: 100 CAPSULE ORAL at 07:21

## 2019-12-31 RX ADMIN — GABAPENTIN 200 MG: 100 CAPSULE ORAL at 11:28

## 2019-12-31 RX ADMIN — LIDOCAINE 1 PATCH: 50 PATCH TOPICAL at 11:26

## 2019-12-31 RX ADMIN — ENOXAPARIN SODIUM 40 MG: 100 INJECTION SUBCUTANEOUS at 04:44

## 2019-12-31 RX ADMIN — SENNOSIDES AND DOCUSATE SODIUM 2 TABLET: 8.6; 5 TABLET ORAL at 04:43

## 2019-12-31 RX ADMIN — FUROSEMIDE 80 MG: 40 TABLET ORAL at 04:43

## 2019-12-31 RX ADMIN — ZIPRASIDONE HYDROCHLORIDE 40 MG: 40 CAPSULE ORAL at 04:43

## 2019-12-31 NOTE — DISCHARGE SUMMARY
Discharge Summary    CHIEF COMPLAINT ON ADMISSION  Chief Complaint   Patient presents with   • Syncope     found down by mother, states that she last saw her 30min prior.    • Dizziness       Reason for Admission  Syncope     CODE STATUS  DNAR/DNI    HPI & HOSPITAL COURSE  This is a 29 y.o. obese female with hypothyroidism, DM2, h/o optic neuritis, chronic hip and back pain here with alerted level of consciousness. Her grandmother is her primary caretaker, who found her on the floor and the patient did not remember what had happened. She denied missing any of her medications or taking more than prescribed. Of note, she has significant polypharmacy. Sedating medications were stopped and then restarted at lower doses to adequate effect.    She was seen by psychiatry and neurology during her hospitalization. Psychiatry recommended cross taper from her home gabapentin to trileptal, which was started while she was hospitalized. And the addition of modafinil for her mood and TONYA. The cause of her symptoms were unclear but she was continued on her home cellcept and prednisone taper for management of her optic neuritis. Neurology recommended IR guided LP which helped with her acute on chronic headache as well as the addition of post procedure diamox. There was no evidence of pseudotumor or spinal leak headache.    She was evaluated by PT and OT who recommended continued inpatient therapy given the amount of support she has at home. SW was involved throughout and she was accepted by [facility] for SNF before returning home with her caretaker/grandmother.         Therefore, she is discharged in good and stable condition to skilled nursing facility.    The patient met 2-midnight criteria for an inpatient stay at the time of discharge.      FOLLOW UP ITEMS POST DISCHARGE  Please follow up with your PCP and Neurology.     Currently doing a cross taper of her gabapentin to trileptal. Will need to be continued by PCP or  "psychiatry.    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      \"per patients grandmother\"    GERD (gastroesophageal reflux disease) (Chronic) POA: Yes    Depression POA: Yes    Type 2 diabetes mellitus with hyperglycemia, without long-term current use of insulin (HCC) POA: Yes    Acquired hypothyroidism (Chronic) POA: Yes    Immunocompromised (HCC) POA: Yes      FOLLOW UP  Future Appointments   Date Time Provider Department Center   1/7/2020  3:00 PM La Lutz M.D. [facility] [address]   1/16/2020  1:45 PM Ignacia Herrera M.D. PULM None   2/25/2020  9:20 AM Torres Brody M.D. [address] [facility]   3/9/2020  1:20 PM Shahriar Moncada M.D. PSCR None     [facility]  [address] [facility] [place] [address]  [phone]        Torres Brody M.D.  [address]  [facility] [address]  [phone]          Luis Felipe Yousif D.O.  [address] [address]  [facility] [address]  [phone]            MEDICATIONS ON DISCHARGE     Medication List      START taking these medications      Instructions   acetaZOLAMIDE  MG Cp12  Commonly known as:  DIAMOX   Take 1 Cap by mouth every day.  Dose:  500 mg     glucose 4 g chewable tablet   Take 4 Tabs by mouth as needed for Low Blood Sugar (If FSBG is less than or equal to 70 mg/dL and patient able to eat or drink).  Dose:  16 g     lidocaine 5 % Ptch  Start taking on:  January 1, 2020  Commonly known as:  LIDODERM   Apply 1 Patch to skin as directed every 24 " hours.  Dose:  1 Patch     modafinil 100 MG Tabs  Start taking on:  January 1, 2020  Commonly known as:  PROVIGIL   Take 1 Tab by mouth every morning for 30 days.  Dose:  100 mg     OXcarbazepine 150 MG Tabs  Commonly known as:  TRILEPTAL   Take 1 Tab by mouth 2 Times a Day.  Dose:  150 mg     senna-docusate 8.6-50 MG Tabs  Commonly known as:  PERICOLACE or SENOKOT S   Take 2 Tabs by mouth 2 times a day as needed for Constipation.  Dose:  2 Tab        CHANGE how you take these medications      Instructions   gabapentin 100 MG Caps  What changed:    · medication strength  · how much to take  Commonly known as:  NEURONTIN   Take 1 Cap by mouth 4 times a day.  Dose:  100 mg     predniSONE 10 MG Tabs  What changed:  Another medication with the same name was removed. Continue taking this medication, and follow the directions you see here.  Commonly known as:  DELTASONE   Take 10 mg by mouth every day.  Dose:  10 mg     pregabalin 150 MG Caps  What changed:    · medication strength  · how much to take  Commonly known as:  LYRICA   Take 1 Cap by mouth 2 Times a Day for 30 days.  Dose:  150 mg     traZODone 100 MG Tabs  What changed:  how much to take  Commonly known as:  DESYREL   Take 0.5 Tabs by mouth every evening.  Dose:  50 mg     ziprasidone 40 MG Caps  What changed:    · medication strength  · how much to take  Commonly known as:  GEODON   Take 1 Cap by mouth 2 Times a Day.  Dose:  40 mg        CONTINUE taking these medications      Instructions   albuterol 108 (90 Base) MCG/ACT Aers inhalation aerosol   Inhale 2 Puffs by mouth every 6 hours as needed for Shortness of Breath.  Dose:  2 Puff     aspirin EC 81 MG Tbec  Commonly known as:  ECOTRIN   Take 81 mg by mouth every day.  Dose:  81 mg     budesonide-formoterol 160-4.5 MCG/ACT Aero  Commonly known as:  SYMBICORT   Inhale 2 Puffs by mouth 2 Times a Day.  Dose:  2 Puff     busPIRone 10 MG Tabs tablet  Commonly known as:  BUSPAR   Take 10 mg by mouth 2 times a  day.  Dose:  10 mg     CELLCEPT 500 MG tablet  Generic drug:  mycophenolate   Take 1,000 mg by mouth 2 times a day.  Dose:  1,000 mg     CORLANOR 7.5 MG Tabs  Generic drug:  Ivabradine HCl   Take 7.5 mg by mouth 2 Times a Day.  Dose:  7.5 mg     folic acid 800 MCG tablet  Commonly known as:  FOLVITE   Take 800 mg by mouth every day.  Dose:  800 mg     furosemide 80 MG Tabs  Commonly known as:  LASIX   Take 80 mg by mouth every day.  Dose:  80 mg     ipratropium-albuterol 0.5-2.5 (3) MG/3ML nebulizer solution  Commonly known as:  DUONEB   3 mL by Nebulization route every 6 hours as needed for Shortness of Breath.  Dose:  3 mL     levothyroxine 75 MCG Tabs  Commonly known as:  SYNTHROID   Take 75 mcg by mouth Every morning on an empty stomach.  Dose:  75 mcg     Melatonin 5 MG Tabs   Take 10 mg by mouth every evening.  Dose:  10 mg     montelukast 10 MG Tabs  Commonly known as:  SINGULAIR   Take 10 mg by mouth every day.  Dose:  10 mg     NEXPLANON 68 MG Impl implant  Generic drug:  etonogestrel   Inject 1 Each as instructed Once.  Dose:  1 Each     ondansetron 4 MG Tbdp  Commonly known as:  ZOFRAN ODT   Take 4 mg by mouth every 6 hours as needed for Nausea.  Dose:  4 mg     potassium chloride SA 20 MEQ Tbcr  Commonly known as:  Kdur   Take 20 mEq by mouth 2 times a day.  Dose:  20 mEq     PROZAC 40 MG capsule  Generic drug:  fluoxetine   Take 40 mg by mouth every day.  Dose:  40 mg     tiotropium 18 MCG Caps  Commonly known as:  SPIRIVA   Inhale 1 Cap by mouth every day.  Dose:  18 mcg     TRULICITY 1.5 MG/0.5ML Sopn  Generic drug:  Dulaglutide   Inject 1.5 mg as instructed every Friday.  Dose:  1.5 mg     VOLTAREN 1 % Gel  Generic drug:  Diclofenac Sodium   Apply 1 Application to skin as directed 4 times a day as needed (on wrists, back, ankles, and feet).  Dose:  1 Application     ZANTAC 300 MG tablet  Generic drug:  ranitidine   Take 300 mg by mouth every morning.  Dose:  300 mg        STOP taking these  "medications    amoxicillin-clavulanate 875-125 MG Tabs  Commonly known as:  AUGMENTIN     methocarbamol 750 MG Tabs  Commonly known as:  ROBAXIN     sodium bicarbonate 325 MG Tabs     sulfamethoxazole-trimethoprim 800-160 MG tablet  Commonly known as:  BACTRIM DS     TYLENOL PM EXTRA STRENGTH  MG Tabs  Generic drug:  diphenhydrAMINE-APAP (sleep)            Allergies  Allergies   Allergen Reactions   • Cefdinir Shortness of Breath and Itching     Tolerated 1/18/17  Tolerates ceftriaxone  Tolerated augmentin 8/2019    • Depakote [Divalproex Sodium] Unspecified     Muscle spasms/muscle pain and weakness     • Doxycycline Anaphylaxis and Vomiting     RXN=unknown   • Amitriptyline Unspecified     Headaches     • Aripiprazole [Abilify] Unspecified     Headaches/muscle twitching     • Clindamycin Nausea     Even with food     • Ees [Erythromycin] Vomiting and Nausea   • Flagyl [Metronidazole Hcl] Unspecified     \"eye problems\"     • Flomax [Tamsulosin Hydrochloride] Swelling   • Levaquin Unspecified     Severe muscle cramps in legs  RXN=unknown   • Metformin Unspecified     Increased lactic acid      • Tape Rash     Tears skin off  coban with Tegaderm tape ok intermittently  RXN=ongoing   • Vancomycin Itching     Pt becomes flushed in face and chest.   RXN=7/10/16   • Wound Dressing Adhesive Hives     By pt report   • Cephalexin [Keflex] Rash     Pt states she gets a rash with this medication  Tolerates ceftriaxone       DIET  Orders Placed This Encounter   Procedures   • Diet Order Diabetic     Standing Status:   Standing     Number of Occurrences:   1     Order Specific Question:   Diet:     Answer:   Diabetic [3]     Order Specific Question:   Texture/Fiber modifications:     Answer:   Dysphagia 3(Mechanical Soft)specify fluid consistency(question 6) [3]     Order Specific Question:   Consistency/Fluid modifications:     Answer:   Thin Liquids [3]     Comments:   No diet coke, 2% milk with meals  "       ACTIVITY  As tolerated and directed by skilled nursing.  Weight bearing as tolerated    LINES, DRAINS, AND WOUNDS  This is an automated list. Peripheral IVs will be removed prior to discharge.                     MENTAL STATUS ON TRANSFER  Level of Consciousness: Alert  Orientation : Oriented x 4  Speech: Speech Clear    CONSULTATIONS  Neurology  Psychiatry   Palliative Care    PROCEDURES  DX-LUMBAR PUNCTURE FOR DIAGNOSIS   Final Result      1.  Fluoroscopic-guided lumbar puncture as described above.   2.  Opening pressure of 25 mm of water.      CT-HEAD W/O   Final Result      No acute intracranial abnormality.      DX-HIP-UNILATERAL-WITH PELVIS-1 VIEW RIGHT   Final Result      No acute osseous abnormality.      CT-HEAD W/O   Final Result         1.  No acute intracranial abnormality.   2.  Stable partial opacification of left mastoid air cells, consider effusion or component of mastoiditis as clinically appropriate      DX-CHEST-PORTABLE (1 VIEW)   Final Result      No evidence of acute cardiopulmonary process.            LABORATORY  Lab Results   Component Value Date    SODIUM 139 12/24/2019    POTASSIUM 3.7 12/24/2019    CHLORIDE 107 12/24/2019    CO2 20 12/24/2019    GLUCOSE 101 (H) 12/24/2019    BUN 17 12/24/2019    CREATININE 0.87 12/24/2019    CREATININE 0.75 (L) 07/20/2010    GLOMRATE >59 07/20/2010        Lab Results   Component Value Date    WBC 7.4 12/24/2019    WBC 6.1 07/20/2010    HEMOGLOBIN 13.7 12/24/2019    HEMATOCRIT 42.8 12/24/2019    PLATELETCT 150 (L) 12/24/2019        Total time of the discharge process exceeds 41 minutes.

## 2019-12-31 NOTE — PROGRESS NOTES
Pt left unit with escort to go to [facility]. Pt has all belongings and medication given back to pt. Pt given discharge instructions and verbalized understanding.  Report called to Melissa HASSAN at 1419 for report. No signs of distress.

## 2019-12-31 NOTE — CARE PLAN
Problem: Safety  Goal: Will remain free from injury  Outcome: MET  Goal: Will remain free from falls  Outcome: MET     Problem: Infection  Goal: Will remain free from infection  Outcome: MET     Problem: Respiratory:  Goal: Respiratory status will improve  Outcome: MET     Problem: Medication  Goal: Compliance with prescribed medication will improve  Outcome: MET     Problem: Knowledge Deficit  Goal: Knowledge of the prescribed therapeutic regimen will improve  Outcome: MET  Goal: Knowledge of disease process/condition, treatment plan, diagnostic tests, and medications will improve  Outcome: MET     Problem: Discharge Barriers/Planning  Goal: Patient's continuum of care needs will be met  Outcome: MET     Problem: Communication  Goal: The ability to communicate needs accurately and effectively will improve  Outcome: MET     Problem: Venous Thromboembolism (VTW)/Deep Vein Thrombosis (DVT) Prevention:  Goal: Patient will participate in Venous Thrombosis (VTE)/Deep Vein Thrombosis (DVT)Prevention Measures  Outcome: MET     Problem: Bowel/Gastric:  Goal: Normal bowel function is maintained or improved  Outcome: MET  Goal: Will not experience complications related to bowel motility  Outcome: MET     Problem: Pain Management  Goal: Pain level will decrease to patient's comfort goal  Outcome: MET     Problem: Psychosocial Needs:  Goal: Level of anxiety will decrease  Outcome: MET     Problem: Urinary Elimination:  Goal: Ability to reestablish a normal urinary elimination pattern will improve  Outcome: MET     Problem: Mobility  Goal: Risk for activity intolerance will decrease  Outcome: MET

## 2019-12-31 NOTE — DISCHARGE PLANNING
Agency/Facility Name: [facility]  Spoke To: Tatyana  Outcome: Can accept pt today. [facility] will set up transportation

## 2019-12-31 NOTE — DISCHARGE PLANNING
Received Transport Form @ 6758  Spoke to Dayo @ [id number]    Transport is scheduled for 12/31 @ 1500 going to [facility].

## 2019-12-31 NOTE — DISCHARGE INSTRUCTIONS
Discharge Instructions    Discharged to other by ambulance with escort. Discharged via ambulance, hospital escort: Yes.  Special equipment needed: Not Applicable    Be sure to schedule a follow-up appointment with your primary care doctor or any specialists as instructed.     Discharge Plan:   Diet Plan: Discussed  Activity Level: Discussed  Confirmed Follow up Appointment: Appointment Scheduled  Confirmed Symptoms Management: Discussed  Medication Reconciliation Updated: Yes  Influenza Vaccine Indication: Not indicated: Previously immunized this influenza season and > 8 years of age    I understand that a diet low in cholesterol, fat, and sodium is recommended for good health. Unless I have been given specific instructions below for another diet, I accept this instruction as my diet prescription.   Other diet: Diabetic Dysphagia 3    Special Instructions: None    · Is patient discharged on Warfarin / Coumadin?   No     Depression / Suicide Risk    As you are discharged from this [facility] facility, it is important to learn how to keep safe from harming yourself.    Recognize the warning signs:  · Abrupt changes in personality, positive or negative- including increase in energy   · Giving away possessions  · Change in eating patterns- significant weight changes-  positive or negative  · Change in sleeping patterns- unable to sleep or sleeping all the time   · Unwillingness or inability to communicate  · Depression  · Unusual sadness, discouragement and loneliness  · Talk of wanting to die  · Neglect of personal appearance   · Rebelliousness- reckless behavior  · Withdrawal from people/activities they love  · Confusion- inability to concentrate     If you or a loved one observes any of these behaviors or has concerns about self-harm, here's what you can do:  · Talk about it- your feelings and reasons for harming yourself  · Remove any means that you might use to hurt yourself (examples: pills, rope, extension  cords, firearm)  · Get professional help from the community (Mental Health, Substance Abuse, psychological counseling)  · Do not be alone:Call your Safe Contact- someone whom you trust who will be there for you.  · Call your local CRISIS HOTLINE 403-4635 or 163-327-8011  · Call your local Children's Mobile Crisis Response Team [place] (383) 280-6133 or www.[employer]  · Call the toll free National Suicide Prevention Hotlines   · National Suicide Prevention Lifeline 998-522-BZZG (7175)  · National Hope Line Network 800-SUICIDE (510-6083)

## 2019-12-31 NOTE — DISCHARGE PLANNING
Anticipated Discharge Disposition:   [facility]    Action:   RN SARA prepared discharge packet for transport to [facility] today via [facility].  Included in discharge packet: chart copies, PASRR #[id number], LOC #[id number], and completed COBRA.    RN CM notified charge nurse Ignacia, and bedside RN Ruth Ann.  RN CM to call patient's grandmother, Vivienne, but patient had called to inform grandmother of transfer today to [facility].    Resources given to patient:  [place] [employer], [facility], [facility], and [facility].  Also given was food bank as well as transitional housing information.    Barriers to Discharge:   None     Plan:   RN CM placed chart copy at front of patient's chart.  RN CM provided contact information for any needs prior to discharge as well as after.

## 2019-12-31 NOTE — PROGRESS NOTES
Report received from night shift RN. Pt resting in bed no signs of distress. Pt assisted to bathroom and then to chair with RN. Will continue to monitor.

## 2019-12-31 NOTE — CARE PLAN
Problem: Safety  Goal: Will remain free from injury  Outcome: PROGRESSING AS EXPECTED  Goal: Will remain free from falls  Outcome: PROGRESSING AS EXPECTED    Bed alarm on, educated to call, hourly rounding in place     Problem: Infection  Goal: Will remain free from infection  Outcome: PROGRESSING AS EXPECTED     Problem: Respiratory:  Goal: Respiratory status will improve  Outcome: PROGRESSING AS EXPECTED

## 2020-01-07 ENCOUNTER — OFFICE VISIT (OUTPATIENT)
Dept: BEHAVIORAL HEALTH | Facility: CLINIC | Age: 30
End: 2020-01-07
Payer: MEDICARE

## 2020-01-07 ENCOUNTER — DOCUMENTATION (OUTPATIENT)
Dept: BEHAVIORAL HEALTH | Facility: CLINIC | Age: 30
End: 2020-01-07

## 2020-01-07 DIAGNOSIS — F60.3 BORDERLINE PERSONALITY DISORDER IN ADULT (HCC): ICD-10-CM

## 2020-01-07 DIAGNOSIS — F20.9 SCHIZOPHRENIA, UNSPECIFIED TYPE (HCC): ICD-10-CM

## 2020-01-07 DIAGNOSIS — F32.5 MAJOR DEPRESSIVE DISORDER WITH SINGLE EPISODE, IN FULL REMISSION (HCC): ICD-10-CM

## 2020-01-07 PROCEDURE — 99214 OFFICE O/P EST MOD 30 MIN: CPT | Performed by: PSYCHIATRY & NEUROLOGY

## 2020-01-07 PROCEDURE — 90833 PSYTX W PT W E/M 30 MIN: CPT | Performed by: PSYCHIATRY & NEUROLOGY

## 2020-01-07 NOTE — PROGRESS NOTES
"[facility]  PSYCHIATRIC FOLLOW-UP NOTE    CC:  Here for follow up visit for medication evaluation and management      History Of Present Illness:  Kristin Balderrama is a 29 y.o. female, single, lives with her grandmother, with a documented history of schizoaffective disorder, personality disorder, and insomnia, the patient states she has schizophrenia and multiple personality disorder, with significant comorbid medical problems requiring frequent hospitalizations, comes in today for follow up and to establish care with a new psychiatrist, as the patient's former psychiatrist, Dr. Burnette, retired.  The patient reported she is currently at a nursing facility and has been there for 1 week following a hospitalization where she fell and hit her head and she says that her legs have not been working.  She reports that a spinal tap was done and showed that she had elevated intracranial pressure.  She says she is required to be at this facility to do rehabilitation.  She continues to feel extreme weakness in her legs and cannot walk unassisted.  Her roommate plays the TV all day and all night and so the patient says she cannot stay in the room and has difficulty sleeping.  Also she reports that several of her medications have been changed due to \"polypharmacy \"and this has affected how she has been feeling.  For example, her trazodone was reduced from 100 mg a night to 25 mg at bedtime.  However her Prozac was increased from 20 mg to 40 milligrams.  Modafinil was added for excessive daytime sleepiness but the patient states she has not noticed any improvement.  Trileptal 150 mg was added and the patient does believe this is helping her.    The patient endorses hallucinations, visual, seeing shadows and auditory hallucination \"whispers.\"  She says the whispers are like white noise that she cannot tell how many voices or whether they are male or female.  Regarding " "her multiple personality disorder, the patient says she just switches personality, and that she is unaware of doing so, and so is not sure what other people experience when her personality changes, that they just tell her that her personality has changed.    She completed a PHQ 9 today.  She endorsed anhedonia, especially since she has been at [facility].  She is experiencing insomnia and states this is due to her trazodone being decreased and her roommate keeping on the TV during the night..  She says she has no appetite and will only eat or drink when she is forced to do so.  She has frequent visits from her grandmother mother and 2 sisters who bring food to her.  She denies any suicidal ideation.    Psychiatric History:  Several hospitalization at [facility] and [place].  At least one suicide attempt by taking a whole bottle of her Seroquel; however, she says she was not hospitalized for the same.  Saw Dr. Burnette for approximately 6 years  Denies participating very often in individual therapy and states that she \"hates\" group therapy.    Family History:  Mother and sister with \"multiple personality disorder \"    Social History:  Homeschooled, obtained high school diploma, was \"in and out of school in special education,\" \"could not socialize so I was homeschooled.\"  She says she was placed in special education because she had problems with some subjects, her best subject with history.  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     Depression screening:  Depression Screen (PHQ-2/PHQ-9) 12/22/2019 12/25/2019 12/29/2019   PHQ-2 Total Score - - -   PHQ-2 Total Score 0 0 0   PHQ-2 Total Score - - -   PHQ-2 Total Score - - -   PHQ-9 Total Score - - -   PHQ-9 Total Score - - -   PHQ-9 Total Score - - -   PHQ-9 Total Score - - -       Interpretation of PHQ-9 " Total Score   Score Severity   1-4 No Depression   5-9 Mild Depression   10-14 Moderate Depression   15-19 Moderately Severe Depression   20-27 Severe Depression    Reviewed the PHQ9 symptoms with the patient that the the patient endorsed and the corresponding severity.    Past Medical, Family and Social History reviewed with the patient.    Current medications and allergies reviewed with the patient.    REVIEW OF SYSTEMS:        Constitutional negative   Eyes negative   Ears/Nose/Mouth/Throat negative   Cardiovascular negative   Respiratory negative   Gastrointestinal negative   Genitourinary negative   Muscular negative   Integumentary negative   Neurological negative   Endocrine negative   Hematologic/Lymphatic negative       Physical Examination and Psychiatric Mental Status Examination:  Vital signs: There were no vitals taken for this visit.    CONSTITUTIONAL:  General Appearance:  Clean, casual attire, good eye contact, engaged with provider    MUSCULOSKELETAL:  Muscle Strength and Tone:  normal, no atrophy, no abnormal movements  Gait and Station:  normal gait, walks without assistance    ORIENTATION:  Oriented to time, place and person  RECENT AND REMOTE MEMORY:  Grossly intact  ATTENTION SPAN AND CONCENTRATION:  within normal range  LANGUAGE:  no deficits appreciated  FUND OF KNOWLEDGE:  has awareness of current events, past history and normal vocabulary  SPEECH:  normal volume, amount, rate and articulation, no perseveration or paucity of language  MOOD:   Depressed  AFFECT:  Congruent with mood  THOUGHT PROCESS:  logical and goal directed  THOUGHT CONTENT:  Denies any SI/HI or AVH, no delusional thinking nor preoccupations appreciated  ASSOCIATIONS:  Intact, not loose, no tangentiality or circumstantiality  MEMORY:  No gross evidence of memory deficits  JUDGMENT:  adequate concerning everyday activities  INSIGHT:  adequate to psychiatric condition        DIAGNOSTIC IMPRESSION:  1. Borderline personality  disorder in adult (HCC)    2. Schizophrenia, unspecified type ([facility])    3. Major depressive disorder with single episode, in full remission ([facility])     Asssessment and Plan:  1. Schizoaffective disorder, depressed type.  2. Personality disorder.  Cont fluoxetine 40 mg one a day   Continue Geodon 40 mg bid  Continue Buspar 10 mg bid.  Continue Trileptal 150 mg     3 insomnia.  Increase Trazodone from 25 mg to 50 mg, note she was on 100 mg one at night before her recent hospitalization. She says it was decreased due to polypharmacy.     4.  Continue f/u of medical problems as directed     5. Follow up in 3 months or call sooner PRN    Risks, benefits, alternatives and side effects were discussed for all medicines prescribed at this visit.  The patient voiced understanding.  The patient agrees to call the clinic with any questions or concerns, or seek emergent medical care if warranted.    The proposed treatment plan was discussed with the patient who was provided the opportunity to ask questions and make suggestions regarding alternative treatment. The patient verbalized understanding and expressed agreement with the plan.    16 minutes or more of the visit was spent in psychotherapy.  (If  16 minutes or greater, add 51422 code)   Psychotherapy include:  Supportive psychotherapy as the patient discussed her medical problems and difficulties at the nursing facility.    La Lutz M.D.  01/07/20    This note was created using voice recognition software (Dragon). The accuracy of the dictation is limited by the abilities of the software. I have reviewed the note prior to signing, however some errors in grammar and context are still possible. If you have any questions related to this note please do not hesitate to contact our office.

## 2020-01-16 ENCOUNTER — OFFICE VISIT (OUTPATIENT)
Dept: PULMONOLOGY | Facility: HOSPICE | Age: 30
End: 2020-01-16
Payer: MEDICARE

## 2020-01-16 VITALS
WEIGHT: 269 LBS | OXYGEN SATURATION: 93 % | HEIGHT: 65 IN | SYSTOLIC BLOOD PRESSURE: 130 MMHG | BODY MASS INDEX: 44.82 KG/M2 | HEART RATE: 90 BPM | RESPIRATION RATE: 16 BRPM | TEMPERATURE: 97.7 F | DIASTOLIC BLOOD PRESSURE: 84 MMHG

## 2020-01-16 DIAGNOSIS — E66.01 CLASS 3 SEVERE OBESITY WITH BODY MASS INDEX (BMI) OF 45.0 TO 49.9 IN ADULT, UNSPECIFIED OBESITY TYPE, UNSPECIFIED WHETHER SERIOUS COMORBIDITY PRESENT (HCC): ICD-10-CM

## 2020-01-16 DIAGNOSIS — J45.50 SEVERE PERSISTENT ASTHMA, UNSPECIFIED WHETHER COMPLICATED: ICD-10-CM

## 2020-01-16 DIAGNOSIS — G47.33 OSA (OBSTRUCTIVE SLEEP APNEA): ICD-10-CM

## 2020-01-16 DIAGNOSIS — J38.3 VOCAL CORD DYSFUNCTION: ICD-10-CM

## 2020-01-16 PROCEDURE — 99214 OFFICE O/P EST MOD 30 MIN: CPT | Performed by: INTERNAL MEDICINE

## 2020-01-16 ASSESSMENT — ENCOUNTER SYMPTOMS
SORE THROAT: 0
CONSTIPATION: 0
SPUTUM PRODUCTION: 0
HEARTBURN: 0
DOUBLE VISION: 0
WEAKNESS: 0
PALPITATIONS: 0
CHILLS: 0
ORTHOPNEA: 0
SHORTNESS OF BREATH: 1
SINUS PAIN: 0
NECK PAIN: 0
PHOTOPHOBIA: 0
BACK PAIN: 0
FEVER: 0
STRIDOR: 0
DEPRESSION: 0
VOMITING: 0
MYALGIAS: 0
DIAPHORESIS: 0
EYE PAIN: 0
NAUSEA: 0
EYE DISCHARGE: 0
ABDOMINAL PAIN: 0
DIZZINESS: 0
COUGH: 0
DIARRHEA: 0
CLAUDICATION: 0
FALLS: 0
HEADACHES: 0
SPEECH CHANGE: 0
FOCAL WEAKNESS: 0
TREMORS: 0
PND: 0
EYE REDNESS: 0
WEIGHT LOSS: 0
BLURRED VISION: 0
WHEEZING: 0
HEMOPTYSIS: 0

## 2020-01-16 NOTE — PROGRESS NOTES
Chief Complaint   Patient presents with   • Asthma     last seen 10/4/19 HOS DX Syncope 12/13/19-12/31/19    • Results     CXR 12/13/19          HPI: This patient is a 29 y.o. female whom is followed in our clinic for asthma and asthma last seen by me on 10/4/19.  The patient has a complicated past medical history including optic neuritis for which she is on immunosuppressive therapy with CellCept and chronic prednisone.  She also suffers from hypothyroid, chronic kidney disorder, unspecified weakness with suspected metabolic (? Mitochondrial) disorder and asthma.  She was diagnosed with mild obstructive sleep apnea after referral to sleep medicine following her first visit with us in July with AHI of 13.  She is pending follow-up to start therapy for this.  With regards to her asthma, this is a childhood diagnosis which was relatively well controlled however complicated by VCD for which she has been seen by ENT and referred to speech but not seen due to recurrent hospitalizations since our last visit for various reasons but most recently fall with LOC.  She is now at [facility] for rehab. She is on chronic prednisone for her optic neuritis.  Pulmonary function test on September 5 showed mild airflow restriction, no bronchodilator response, low normal lung volumes and low normal DLCO.  She did not desaturate on 6-minute walk test but has used supplemental O2 with activity in the past, not currently using at [facility] per pt.  Her regimen includes Symbicort 160, Spiriva Respimat, Singulair, as needed short acting bronchodilators in the chronic prednisone for which is actually prescribed for her optic neuritis. We have evaluated IgE and eosinophils in the past which are normal.  Today she is doing well other than R hip pain present since a fall in December.  She does get relief from rescue bronchodilator as needed which she uses typically at least once per day at rehab.     Past Medical History:   Diagnosis Date  "  • Abdominal pain    • Anginal syndrome     random chest pain especially with tachycardia   • Apnea, sleep    • Arrhythmia     \"sinus tachycardia\", cariologist, Dr. Kumar; ablation 2/2016   • Arthritis     osteo   • ASTHMA     when around smoke   • Atrial fibrillation (HCC)    • Back pain    • Borderline personality disorder (HCC)    • Breath shortness     with tachycardia   • Bronchitis    • Cardiac arrhythmia    • Chickenpox    • Chronic UTI 9/18/2010   • Cough    • Daytime sleepiness    • Depression    • Diabetes (HCC)    • Diarrhea    • Disorder of thyroid    • Fall    • Fatigue    • Frequent headaches    • Gasping for breath    • Gynecological disorder     PCOS   • Headache(784.0)    • Heart burn    • History of falling    • Hypertension    • Indigestion    • Migraine    • 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HCC)    • 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HCC)    • Nausea    • Obesity    • Pain 08-15-12    back, 7/10   • Painful joint    • PCOS (polycystic ovarian syndrome)    • Pneumonia 2012   • Psychosis (HCC)    • Renal disorder     \"kidney disease, stage 1\" nephrologist, Dr. 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   • Urinary bladder disorder     Suprapubic cath   • Urinary incontinence    • Weakness    • Wears glasses        Social History     Socioeconomic History   • Marital status: Single     Spouse name: Not on file   • Number of children: Not on file   • Years of education: Not on file   • Highest education level: Not on file   Occupational History   • Not on file   Social Needs   • Financial resource strain: Not on file   • Food insecurity:     Worry: Not on file     Inability: Not on file   • Transportation needs:     Medical: Not on file     Non-medical: Not on file   Tobacco Use   • Smoking " status: Never Smoker   • Smokeless tobacco: Never Used   Substance and Sexual Activity   • Alcohol use: No     Alcohol/week: 0.0 oz   • Drug use: Not Currently     Frequency: 7.0 times per week     Types: Marijuana, Oral     Comment: Medicinal edible's   • Sexual activity: Not Currently     Birth control/protection: Pill   Lifestyle   • Physical activity:     Days per week: Not on file     Minutes per session: Not on file   • Stress: Not on file   Relationships   • Social connections:     Talks on phone: Not on file     Gets together: Not on file     Attends [employer]ism service: Not on file     Active member of club or organization: Not on file     Attends meetings of clubs or organizations: Not on file     Relationship status: Not on file   • Intimate partner violence:     Fear of current or ex partner: Not on file     Emotionally abused: Not on file     Physically abused: Not on file     Forced sexual activity: Not on file   Other Topics Concern   • Not on file   Social History Narrative    ** Merged History Encounter **            Family History   Problem Relation Age of Onset   • Hypertension Mother    • Sleep Apnea Mother    • Heart Disease Mother    • Other Mother         hypothryod   • Hypertension Maternal Uncle    • Heart Disease Maternal Grandmother    • Hypertension Maternal Grandmother    • No Known Problems Sister    • Other Sister         Narcolepsy;fibromyalsia;bone;nerve   • Genitourinary () Problems Sister         endometriosis       Current Outpatient Medications on File Prior to Visit   Medication Sig Dispense Refill   • traZODone (DESYREL) 100 MG Tab Take 0.5 Tabs by mouth every evening. 30 Tab 3   • ziprasidone (GEODON) 40 MG Cap Take 1 Cap by mouth 2 Times a Day.     • lidocaine (LIDODERM) 5 % Patch Apply 1 Patch to skin as directed every 24 hours. 10 Patch    • OXcarbazepine (TRILEPTAL) 150 MG Tab Take 1 Tab by mouth 2 Times a Day. 60 Tab    • senna-docusate (PERICOLACE OR SENOKOT S) 8.6-50 MG  Tab Take 2 Tabs by mouth 2 times a day as needed for Constipation.     • Dextrose, Diabetic Use, (GLUCOSE) 4 g chewable tablet Take 4 Tabs by mouth as needed for Low Blood Sugar (If FSBG is less than or equal to 70 mg/dL and patient able to eat or drink). 30 Tab    • acetaZOLAMIDE SR (DIAMOX) 500 MG CAPSULE SR 12 HR Take 1 Cap by mouth every day. 60 Cap    • pregabalin (LYRICA) 150 MG Cap Take 1 Cap by mouth 2 Times a Day for 30 days. 60 Cap 0   • modafinil (PROVIGIL) 100 MG Tab Take 1 Tab by mouth every morning for 30 days. 30 Tab 0   • gabapentin (NEURONTIN) 100 MG Cap Take 1 Cap by mouth 4 times a day.     • predniSONE (DELTASONE) 10 MG Tab Take 10 mg by mouth every day.     • budesonide-formoterol (SYMBICORT) 160-4.5 MCG/ACT Aerosol Inhale 2 Puffs by mouth 2 Times a Day.     • fluoxetine (PROZAC) 40 MG capsule Take 40 mg by mouth every day.     • aspirin EC (ECOTRIN) 81 MG Tablet Delayed Response Take 81 mg by mouth every day.     • busPIRone (BUSPAR) 10 MG Tab tablet Take 10 mg by mouth 2 times a day.     • levothyroxine (SYNTHROID) 75 MCG Tab Take 75 mcg by mouth Every morning on an empty stomach.     • montelukast (SINGULAIR) 10 MG Tab Take 10 mg by mouth every day.     • ondansetron (ZOFRAN ODT) 4 MG TABLET DISPERSIBLE Take 4 mg by mouth every 6 hours as needed for Nausea.     • potassium chloride SA (KDUR) 20 MEQ Tab CR Take 20 mEq by mouth 2 times a day.     • ranitidine (ZANTAC) 300 MG tablet Take 300 mg by mouth every morning.     • Diclofenac Sodium (VOLTAREN) 1 % Gel Apply 1 Application to skin as directed 4 times a day as needed (on wrists, back, ankles, and feet).     • tiotropium (SPIRIVA) 18 MCG Cap Inhale 1 Cap by mouth every day. 90 Cap 3   • folic acid (FOLVITE) 800 MCG tablet Take 800 mg by mouth every day.     • Ivabradine HCl (CORLANOR) 7.5 MG Tab Take 7.5 mg by mouth 2 Times a Day.     • ipratropium-albuterol (DUONEB) 0.5-2.5 (3) MG/3ML nebulizer solution 3 mL by Nebulization route every 6  hours as needed for Shortness of Breath. 60 Bullet 1   • etonogestrel (NEXPLANON) 68 MG Implant implant Inject 1 Each as instructed Once.     • mycophenolate (CELLCEPT) 500 MG tablet Take 1,000 mg by mouth 2 times a day.     • Dulaglutide (TRULICITY) 1.5 MG/0.5ML Solution Pen-injector Inject 1.5 mg as instructed every Friday.     • albuterol 108 (90 Base) MCG/ACT Aero Soln inhalation aerosol Inhale 2 Puffs by mouth every 6 hours as needed for Shortness of Breath.     • Melatonin 5 MG Tab Take 10 mg by mouth every evening.     • furosemide (LASIX) 80 MG Tab Take 80 mg by mouth every day.       No current facility-administered medications on file prior to visit.        Cefdinir; Depakote [divalproex sodium]; Doxycycline; Amitriptyline; Aripiprazole [abilify]; Clindamycin; Ees [erythromycin]; Flagyl [metronidazole hcl]; Flomax [tamsulosin hydrochloride]; Levaquin; Metformin; Tape; Vancomycin; Wound dressing adhesive; Ciprofloxacin; Depakote  [divalproex sodium]; Erythromycin; Hydromorphone hcl; Kdc:metronidazole+tartrazine; Keflex; Levofloxacin; and Penicillins      ROS:   Review of Systems   Constitutional: Negative for chills, diaphoresis, fever, malaise/fatigue and weight loss.   HENT: Negative for congestion, ear discharge, ear pain, hearing loss, nosebleeds, sinus pain, sore throat and tinnitus.    Eyes: Negative for blurred vision, double vision, photophobia, pain, discharge and redness.   Respiratory: Positive for shortness of breath. Negative for cough, hemoptysis, sputum production, wheezing and stridor.    Cardiovascular: Negative for chest pain, palpitations, orthopnea, claudication, leg swelling and PND.   Gastrointestinal: Negative for abdominal pain, constipation, diarrhea, heartburn, nausea and vomiting.   Genitourinary: Negative for dysuria and urgency.   Musculoskeletal: Positive for joint pain. Negative for back pain, falls, myalgias and neck pain.   Skin: Negative for itching and rash.  "  Neurological: Negative for dizziness, tremors, speech change, focal weakness, weakness and headaches.   Endo/Heme/Allergies: Negative for environmental allergies.   Psychiatric/Behavioral: Negative for depression.       /84 (BP Location: Left arm, Patient Position: Sitting, BP Cuff Size: Large adult)   Pulse 90   Temp 36.5 °C (97.7 °F) (Temporal)   Resp 16   Ht 1.651 m (5' 5\")   Wt 122 kg (269 lb)   SpO2 93%   Physical Exam  Constitutional:       General: She is not in acute distress.     Appearance: Normal appearance. She is well-developed. She is obese.   HENT:      Head: Normocephalic and atraumatic.      Right Ear: External ear normal.      Left Ear: External ear normal.      Nose: Nose normal. No congestion.      Mouth/Throat:      Mouth: Mucous membranes are moist.      Pharynx: Oropharynx is clear. No oropharyngeal exudate.   Eyes:      General: No scleral icterus.     Conjunctiva/sclera: Conjunctivae normal.      Pupils: Pupils are equal, round, and reactive to light.   Neck:      Musculoskeletal: Neck supple.      Vascular: No JVD.      Trachea: No tracheal deviation.   Cardiovascular:      Rate and Rhythm: Normal rate and regular rhythm.      Heart sounds: Normal heart sounds. No murmur. No friction rub. No gallop.    Pulmonary:      Effort: Pulmonary effort is normal. No accessory muscle usage or respiratory distress.      Breath sounds: Normal breath sounds. No wheezing or rales.   Abdominal:      General: There is no distension.      Palpations: Abdomen is soft.      Tenderness: There is no tenderness.      Comments: obese   Musculoskeletal: Normal range of motion.         General: No tenderness or deformity.      Right lower leg: No edema.      Left lower leg: No edema.   Lymphadenopathy:      Cervical: No cervical adenopathy.   Skin:     General: Skin is warm and dry.      Findings: No rash.      Nails: There is no clubbing.     Neurological:      Mental Status: She is alert and oriented " to person, place, and time.      Cranial Nerves: No cranial nerve deficit.      Gait: Gait normal.   Psychiatric:         Mood and Affect: Mood normal.         Behavior: Behavior normal.         PFTs as reviewed by me personally: as per HPI    Imaging as reviewed by me personally:  As per HPI    Assessment:  1. TONYA (obstructive sleep apnea)     2. Vocal cord dysfunction     3. Severe persistent asthma, unspecified whether complicated     4. Class 3 severe obesity with body mass index (BMI) of 45.0 to 49.9 in adult, unspecified obesity type, unspecified whether serious comorbidity present (HCC)         Plan:  1.  This was recently diagnosed and mild although she does have evidence of hypoventilation on chest x-ray from December.  She should likely be treated even though mild given she would not tolerate respiratory acidosis on top of mild chronic metabolic acidosis.  I encouraged her to follow-up with sleep which scheduled in March.  2.  This is not a major issue at this point however suspected after ENT eval.  She has been referred to speech therapy which we can revisit at follow-up.  3.  No evidence of acute exacerbation on exam today.  Suspect more of her issues are related to chronic hypoventilation.  We will continue Symbicort, Spiriva and as needed short acting bronchodilators.  Continue Singulair.  4.  This is an issue for the patient given limited mobility, mild obstructive sleep apnea and possible contribution of hypoventilation.  She is not having serologic evidence of bicarb retention however she does have a questionable metabolic disorder that complicates the picture.  Activity to help promote lung expansion will always be helpful for this patient.  Return in about 3 months (around 4/16/2020) for asthma.

## 2020-01-29 ENCOUNTER — TELEPHONE (OUTPATIENT)
Dept: MEDICAL GROUP | Facility: MEDICAL CENTER | Age: 30
End: 2020-01-29

## 2020-01-29 RX ORDER — ZIPRASIDONE HYDROCHLORIDE 40 MG/1
40 CAPSULE ORAL 2 TIMES DAILY
Qty: 60 CAP | Refills: 0 | Status: SHIPPED | OUTPATIENT
Start: 2020-01-29 | End: 2020-02-21

## 2020-01-29 RX ORDER — PROMETHAZINE HYDROCHLORIDE 25 MG/1
25 SUPPOSITORY RECTAL EVERY 6 HOURS PRN
Qty: 10 SUPPOSITORY | Refills: 0 | Status: ON HOLD
Start: 2020-01-29 | End: 2020-05-19

## 2020-01-29 NOTE — TELEPHONE ENCOUNTER
VOICEMAIL  1. Caller Name: Kristin                      Call Back Number: [phone]    2. Message: Patient called and is having severe nausea and is unable to keep food down. The zofran is not working. She was wondering if you can prescribe something else for the nausea. Please advise.    3. Patient approves office to leave a detailed voicemail/[facility] message: N\A

## 2020-01-29 NOTE — TELEPHONE ENCOUNTER
Received request via: Patient    Was the patient seen in the last year in this department? Yes    Does the patient have an active prescription: Yes

## 2020-01-30 ENCOUNTER — APPOINTMENT (OUTPATIENT)
Dept: MEDICAL GROUP | Facility: MEDICAL CENTER | Age: 30
End: 2020-01-30
Payer: MEDICARE

## 2020-02-03 ENCOUNTER — APPOINTMENT (OUTPATIENT)
Dept: RADIOLOGY | Facility: MEDICAL CENTER | Age: 30
End: 2020-02-03
Attending: EMERGENCY MEDICINE
Payer: MEDICARE

## 2020-02-03 ENCOUNTER — HOSPITAL ENCOUNTER (EMERGENCY)
Facility: MEDICAL CENTER | Age: 30
End: 2020-02-04
Attending: EMERGENCY MEDICINE
Payer: MEDICARE

## 2020-02-03 ENCOUNTER — HOSPITAL ENCOUNTER (EMERGENCY)
Facility: MEDICAL CENTER | Age: 30
End: 2020-02-03
Attending: EMERGENCY MEDICINE
Payer: MEDICARE

## 2020-02-03 VITALS
WEIGHT: 263 LBS | TEMPERATURE: 97.5 F | OXYGEN SATURATION: 99 % | BODY MASS INDEX: 43.82 KG/M2 | RESPIRATION RATE: 18 BRPM | SYSTOLIC BLOOD PRESSURE: 145 MMHG | HEIGHT: 65 IN | DIASTOLIC BLOOD PRESSURE: 69 MMHG | HEART RATE: 70 BPM

## 2020-02-03 DIAGNOSIS — R10.9 ABDOMINAL PAIN, UNSPECIFIED ABDOMINAL LOCATION: ICD-10-CM

## 2020-02-03 DIAGNOSIS — R05.9 COUGH: ICD-10-CM

## 2020-02-03 DIAGNOSIS — M79.604 PAIN OF RIGHT LOWER EXTREMITY: ICD-10-CM

## 2020-02-03 DIAGNOSIS — R10.31 RIGHT INGUINAL PAIN: ICD-10-CM

## 2020-02-03 DIAGNOSIS — N83.201 CYST OF RIGHT OVARY: ICD-10-CM

## 2020-02-03 DIAGNOSIS — J06.9 VIRAL URI: ICD-10-CM

## 2020-02-03 LAB
ALBUMIN SERPL BCP-MCNC: 4.4 G/DL (ref 3.2–4.9)
ALBUMIN SERPL BCP-MCNC: 4.8 G/DL (ref 3.2–4.9)
ALBUMIN/GLOB SERPL: 2 G/DL
ALBUMIN/GLOB SERPL: 2 G/DL
ALP SERPL-CCNC: 58 U/L (ref 30–99)
ALP SERPL-CCNC: 68 U/L (ref 30–99)
ALT SERPL-CCNC: 24 U/L (ref 2–50)
ALT SERPL-CCNC: 41 U/L (ref 2–50)
ANION GAP SERPL CALC-SCNC: 10 MMOL/L (ref 0–11.9)
ANION GAP SERPL CALC-SCNC: 11 MMOL/L (ref 0–11.9)
APPEARANCE UR: ABNORMAL
AST SERPL-CCNC: 13 U/L (ref 12–45)
AST SERPL-CCNC: 17 U/L (ref 12–45)
BACTERIA #/AREA URNS HPF: ABNORMAL /HPF
BASOPHILS # BLD AUTO: 0.4 % (ref 0–1.8)
BASOPHILS # BLD AUTO: 0.7 % (ref 0–1.8)
BASOPHILS # BLD: 0.03 K/UL (ref 0–0.12)
BASOPHILS # BLD: 0.05 K/UL (ref 0–0.12)
BILIRUB SERPL-MCNC: 0.3 MG/DL (ref 0.1–1.5)
BILIRUB SERPL-MCNC: 0.5 MG/DL (ref 0.1–1.5)
BILIRUB UR QL STRIP.AUTO: NEGATIVE
BUN SERPL-MCNC: 13 MG/DL (ref 8–22)
BUN SERPL-MCNC: 14 MG/DL (ref 8–22)
CALCIUM SERPL-MCNC: 9.3 MG/DL (ref 8.5–10.5)
CALCIUM SERPL-MCNC: 9.8 MG/DL (ref 8.5–10.5)
CHLORIDE SERPL-SCNC: 108 MMOL/L (ref 96–112)
CHLORIDE SERPL-SCNC: 110 MMOL/L (ref 96–112)
CO2 SERPL-SCNC: 19 MMOL/L (ref 20–33)
CO2 SERPL-SCNC: 21 MMOL/L (ref 20–33)
COLOR UR: YELLOW
CREAT SERPL-MCNC: 0.82 MG/DL (ref 0.5–1.4)
CREAT SERPL-MCNC: 0.95 MG/DL (ref 0.5–1.4)
EOSINOPHIL # BLD AUTO: 0.16 K/UL (ref 0–0.51)
EOSINOPHIL # BLD AUTO: 0.19 K/UL (ref 0–0.51)
EOSINOPHIL NFR BLD: 2.1 % (ref 0–6.9)
EOSINOPHIL NFR BLD: 2.7 % (ref 0–6.9)
EPI CELLS #/AREA URNS HPF: ABNORMAL /HPF
ERYTHROCYTE [DISTWIDTH] IN BLOOD BY AUTOMATED COUNT: 42.6 FL (ref 35.9–50)
ERYTHROCYTE [DISTWIDTH] IN BLOOD BY AUTOMATED COUNT: 43.6 FL (ref 35.9–50)
FLUAV RNA SPEC QL NAA+PROBE: NEGATIVE
FLUBV RNA SPEC QL NAA+PROBE: NEGATIVE
GLOBULIN SER CALC-MCNC: 2.2 G/DL (ref 1.9–3.5)
GLOBULIN SER CALC-MCNC: 2.4 G/DL (ref 1.9–3.5)
GLUCOSE SERPL-MCNC: 121 MG/DL (ref 65–99)
GLUCOSE SERPL-MCNC: 92 MG/DL (ref 65–99)
GLUCOSE UR STRIP.AUTO-MCNC: NEGATIVE MG/DL
HCG SERPL QL: NEGATIVE
HCG UR QL: NEGATIVE
HCT VFR BLD AUTO: 41.7 % (ref 37–47)
HCT VFR BLD AUTO: 44.6 % (ref 37–47)
HGB BLD-MCNC: 14.3 G/DL (ref 12–16)
HGB BLD-MCNC: 14.9 G/DL (ref 12–16)
HYALINE CASTS #/AREA URNS LPF: ABNORMAL /LPF
IMM GRANULOCYTES # BLD AUTO: 0.07 K/UL (ref 0–0.11)
IMM GRANULOCYTES # BLD AUTO: 0.08 K/UL (ref 0–0.11)
IMM GRANULOCYTES NFR BLD AUTO: 0.9 % (ref 0–0.9)
IMM GRANULOCYTES NFR BLD AUTO: 1.1 % (ref 0–0.9)
KETONES UR STRIP.AUTO-MCNC: NEGATIVE MG/DL
LEUKOCYTE ESTERASE UR QL STRIP.AUTO: ABNORMAL
LIPASE SERPL-CCNC: 38 U/L (ref 11–82)
LIPASE SERPL-CCNC: 48 U/L (ref 11–82)
LYMPHOCYTES # BLD AUTO: 2.3 K/UL (ref 1–4.8)
LYMPHOCYTES # BLD AUTO: 2.87 K/UL (ref 1–4.8)
LYMPHOCYTES NFR BLD: 30.4 % (ref 22–41)
LYMPHOCYTES NFR BLD: 40.9 % (ref 22–41)
MCH RBC QN AUTO: 29.7 PG (ref 27–33)
MCH RBC QN AUTO: 30.4 PG (ref 27–33)
MCHC RBC AUTO-ENTMCNC: 33.4 G/DL (ref 33.6–35)
MCHC RBC AUTO-ENTMCNC: 34.3 G/DL (ref 33.6–35)
MCV RBC AUTO: 88.5 FL (ref 81.4–97.8)
MCV RBC AUTO: 88.8 FL (ref 81.4–97.8)
MICRO URNS: ABNORMAL
MONOCYTES # BLD AUTO: 0.49 K/UL (ref 0–0.85)
MONOCYTES # BLD AUTO: 0.53 K/UL (ref 0–0.85)
MONOCYTES NFR BLD AUTO: 7 % (ref 0–13.4)
MONOCYTES NFR BLD AUTO: 7 % (ref 0–13.4)
NEUTROPHILS # BLD AUTO: 3.35 K/UL (ref 2–7.15)
NEUTROPHILS # BLD AUTO: 4.45 K/UL (ref 2–7.15)
NEUTROPHILS NFR BLD: 47.9 % (ref 44–72)
NEUTROPHILS NFR BLD: 58.9 % (ref 44–72)
NITRITE UR QL STRIP.AUTO: NEGATIVE
NRBC # BLD AUTO: 0 K/UL
NRBC # BLD AUTO: 0 K/UL
NRBC BLD-RTO: 0 /100 WBC
NRBC BLD-RTO: 0 /100 WBC
PH UR STRIP.AUTO: 5.5 [PH] (ref 5–8)
PLATELET # BLD AUTO: 186 K/UL (ref 164–446)
PLATELET # BLD AUTO: 217 K/UL (ref 164–446)
PMV BLD AUTO: 11.4 FL (ref 9–12.9)
PMV BLD AUTO: 11.7 FL (ref 9–12.9)
POTASSIUM SERPL-SCNC: 3.3 MMOL/L (ref 3.6–5.5)
POTASSIUM SERPL-SCNC: 3.5 MMOL/L (ref 3.6–5.5)
PROT SERPL-MCNC: 6.6 G/DL (ref 6–8.2)
PROT SERPL-MCNC: 7.2 G/DL (ref 6–8.2)
PROT UR QL STRIP: NEGATIVE MG/DL
RBC # BLD AUTO: 4.71 M/UL (ref 4.2–5.4)
RBC # BLD AUTO: 5.02 M/UL (ref 4.2–5.4)
RBC # URNS HPF: ABNORMAL /HPF
RBC UR QL AUTO: NEGATIVE
SODIUM SERPL-SCNC: 139 MMOL/L (ref 135–145)
SODIUM SERPL-SCNC: 140 MMOL/L (ref 135–145)
SP GR UR STRIP.AUTO: 1.03
UROBILINOGEN UR STRIP.AUTO-MCNC: 0.2 MG/DL
WBC # BLD AUTO: 7 K/UL (ref 4.8–10.8)
WBC # BLD AUTO: 7.6 K/UL (ref 4.8–10.8)
WBC #/AREA URNS HPF: ABNORMAL /HPF

## 2020-02-03 PROCEDURE — 700102 HCHG RX REV CODE 250 W/ 637 OVERRIDE(OP): Performed by: EMERGENCY MEDICINE

## 2020-02-03 PROCEDURE — 84703 CHORIONIC GONADOTROPIN ASSAY: CPT

## 2020-02-03 PROCEDURE — 99285 EMERGENCY DEPT VISIT HI MDM: CPT | Mod: 25

## 2020-02-03 PROCEDURE — 81025 URINE PREGNANCY TEST: CPT

## 2020-02-03 PROCEDURE — 700117 HCHG RX CONTRAST REV CODE 255: Performed by: EMERGENCY MEDICINE

## 2020-02-03 PROCEDURE — 96374 THER/PROPH/DIAG INJ IV PUSH: CPT | Mod: XU

## 2020-02-03 PROCEDURE — 96376 TX/PRO/DX INJ SAME DRUG ADON: CPT | Mod: XU

## 2020-02-03 PROCEDURE — 85025 COMPLETE CBC W/AUTO DIFF WBC: CPT | Mod: 91

## 2020-02-03 PROCEDURE — 81001 URINALYSIS AUTO W/SCOPE: CPT

## 2020-02-03 PROCEDURE — 73706 CT ANGIO LWR EXTR W/O&W/DYE: CPT | Mod: RT

## 2020-02-03 PROCEDURE — 71045 X-RAY EXAM CHEST 1 VIEW: CPT

## 2020-02-03 PROCEDURE — 87086 URINE CULTURE/COLONY COUNT: CPT

## 2020-02-03 PROCEDURE — 83690 ASSAY OF LIPASE: CPT | Mod: 91

## 2020-02-03 PROCEDURE — 700111 HCHG RX REV CODE 636 W/ 250 OVERRIDE (IP)

## 2020-02-03 PROCEDURE — 76830 TRANSVAGINAL US NON-OB: CPT

## 2020-02-03 PROCEDURE — 83690 ASSAY OF LIPASE: CPT

## 2020-02-03 PROCEDURE — 99285 EMERGENCY DEPT VISIT HI MDM: CPT | Mod: 25,27

## 2020-02-03 PROCEDURE — 700111 HCHG RX REV CODE 636 W/ 250 OVERRIDE (IP): Performed by: EMERGENCY MEDICINE

## 2020-02-03 PROCEDURE — 74177 CT ABD & PELVIS W/CONTRAST: CPT

## 2020-02-03 PROCEDURE — 85025 COMPLETE CBC W/AUTO DIFF WBC: CPT

## 2020-02-03 PROCEDURE — 80053 COMPREHEN METABOLIC PANEL: CPT | Mod: 91

## 2020-02-03 PROCEDURE — A9270 NON-COVERED ITEM OR SERVICE: HCPCS | Performed by: EMERGENCY MEDICINE

## 2020-02-03 PROCEDURE — 87502 INFLUENZA DNA AMP PROBE: CPT

## 2020-02-03 PROCEDURE — 80053 COMPREHEN METABOLIC PANEL: CPT

## 2020-02-03 RX ORDER — MORPHINE SULFATE 4 MG/ML
4 INJECTION, SOLUTION INTRAMUSCULAR; INTRAVENOUS ONCE
Status: COMPLETED | OUTPATIENT
Start: 2020-02-03 | End: 2020-02-03

## 2020-02-03 RX ORDER — ACETAMINOPHEN 325 MG/1
650 TABLET ORAL ONCE
Status: COMPLETED | OUTPATIENT
Start: 2020-02-03 | End: 2020-02-03

## 2020-02-03 RX ORDER — MORPHINE SULFATE 4 MG/ML
INJECTION, SOLUTION INTRAMUSCULAR; INTRAVENOUS
Status: COMPLETED
Start: 2020-02-03 | End: 2020-02-03

## 2020-02-03 RX ADMIN — MORPHINE SULFATE 4 MG: 4 INJECTION INTRAVENOUS at 23:30

## 2020-02-03 RX ADMIN — IOHEXOL 100 ML: 350 INJECTION, SOLUTION INTRAVENOUS at 23:40

## 2020-02-03 RX ADMIN — MORPHINE SULFATE 4 MG: 4 INJECTION INTRAVENOUS at 22:15

## 2020-02-03 RX ADMIN — IOHEXOL 100 ML: 350 INJECTION, SOLUTION INTRAVENOUS at 04:53

## 2020-02-03 RX ADMIN — MORPHINE SULFATE 4 MG: 4 INJECTION, SOLUTION INTRAMUSCULAR; INTRAVENOUS at 22:15

## 2020-02-03 RX ADMIN — MORPHINE SULFATE 4 MG: 4 INJECTION INTRAVENOUS at 04:05

## 2020-02-03 RX ADMIN — ACETAMINOPHEN 650 MG: 325 TABLET, FILM COATED ORAL at 07:30

## 2020-02-03 ASSESSMENT — LIFESTYLE VARIABLES
EVER FELT BAD OR GUILTY ABOUT YOUR DRINKING: NO
HAVE PEOPLE ANNOYED YOU BY CRITICIZING YOUR DRINKING: NO
EVER HAD A DRINK FIRST THING IN THE MORNING TO STEADY YOUR NERVES TO GET RID OF A HANGOVER: NO
TOTAL SCORE: 0
DOES PATIENT WANT TO STOP DRINKING: NO
DO YOU DRINK ALCOHOL: NO
HAVE YOU EVER FELT YOU SHOULD CUT DOWN ON YOUR DRINKING: NO
TOTAL SCORE: 0
TOTAL SCORE: 0
CONSUMPTION TOTAL: INCOMPLETE

## 2020-02-03 NOTE — ED NOTES
Patient BIBA and is wheelchair confined at baseline, patient w/c was left at home and patient has no one who can pick her up. Notified SW who is arranging [facility] transport home.

## 2020-02-03 NOTE — ED NOTES
Patient awake alert and oriented x 4, Glasc[place] 15, bed in low position, call light within reach, on room air, attached to cardiac monitor, unlabored breathing noted, no cough noted, interacts with staff, interactions noted as appropriate, urine sent to lab for processing.

## 2020-02-03 NOTE — ED NOTES
Contacted Kip in lab at this, platelets noted at 186, mistakenly informed of critical platelets of 24, Dr. Vela informed, will continue to assess patient.

## 2020-02-03 NOTE — ED NOTES
Patient provided apple juice per request and updated that we are waiting for [facility] transport home which is delayed at this time.

## 2020-02-03 NOTE — ED TRIAGE NOTES
Chief Complaint   Patient presents with   • Groin Pain     For six hours, history of ablation with subsequent anyeurism in right groin

## 2020-02-03 NOTE — ED PROVIDER NOTES
ED Provider Note    CHIEF COMPLAINT  Chief Complaint   Patient presents with   • Groin Pain     For six hours, history of ablation with subsequent anyeurism in right groin       HPI  Kristin Balderrama is a 29 y.o. female who presents with right groin and hip pain over the past few hours since around 9 PM.  She states that she had a cardiac ablation procedure in 2016 and attributes her groin pain to that however she did not have pain until now.  No history of trauma.  Additionally, the patient notes cough and congestion over the past week or week and a half now.  Has mild diffuse abdominal pain and vomiting and diarrhea.  Subjective fevers.  No known sick contacts.  No numbness or weakness down her right lower extremity though notes that the pain shoots down her right leg on the anterior medial aspect.  No swelling or rash.    REVIEW OF SYSTEMS  See HPI for further details. All other systems are negative.     PAST MEDICAL HISTORY   has a past medical history of Abdominal pain, Anginal syndrome, Apnea, sleep, Arrhythmia, Arthritis, ASTHMA, Atrial fibrillation ([facility]), Back pain, Borderline personality disorder ([facility]), Breath shortness, Bronchitis, Cardiac arrhythmia, Chickenpox, Chronic UTI (9/18/2010), Cough, Daytime sleepiness, Depression, Diabetes ([facility]), Diarrhea, Disorder of thyroid, Fall, Fatigue, Frequent headaches, Gasping for breath, Gynecological disorder, Headache(784.0), Heart burn, History of falling, Hypertension, Indigestion, Migraine, 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    SOCIAL HISTORY  Social History     Tobacco Use   • Smoking  status: Never Smoker   • Smokeless tobacco: Never Used   Substance and Sexual Activity   • Alcohol use: No     Alcohol/week: 0.0 oz   • Drug use: Not Currently     Frequency: 7.0 times per week     Types: Marijuana, Oral     Comment: Medicinal edible's   • Sexual activity: Not Currently     Birth control/protection: Pill       SURGICAL HISTORY   has a past surgical history that includes neuro dest facet l/s w/ig sngl (4/21/2015); recovery (1/27/2016); delmar by laparoscopy (8/29/2010); lumbar fusion anterior (8/21/2012); other cardiac surgery (1/2016); tonsillectomy; bowel stimulator insertion (7/13/2016); gastroscopy with balloon dilatation (N/A, 1/4/2017); muscle biopsy (Right, 1/26/2017); other abdominal surgery; and laminotomy.    CURRENT MEDICATIONS  Home Medications     Reviewed by Gordo Julio R.N. (Registered Nurse) on 02/03/20 at [id number]  Med List Status: Partial   Medication Last Dose Status   acetaZOLAMIDE SR (DIAMOX) 500 MG CAPSULE SR 12 HR  Active   albuterol 108 (90 Base) MCG/ACT Aero Soln inhalation aerosol  Active   aspirin EC (ECOTRIN) 81 MG Tablet Delayed Response  Active   budesonide-formoterol (SYMBICORT) 160-4.5 MCG/ACT Aerosol  Active   busPIRone (BUSPAR) 10 MG Tab tablet  Active   Dextrose, Diabetic Use, (GLUCOSE) 4 g chewable tablet  Active   Diclofenac Sodium (VOLTAREN) 1 % Gel  Active   Dulaglutide (TRULICITY) 1.5 MG/0.5ML Solution Pen-injector  Active   etonogestrel (NEXPLANON) 68 MG Implant implant  Active   fluoxetine (PROZAC) 40 MG capsule  Active   folic acid (FOLVITE) 800 MCG tablet  Active   furosemide (LASIX) 80 MG Tab  Active   gabapentin (NEURONTIN) 100 MG Cap  Active   ipratropium-albuterol (DUONEB) 0.5-2.5 (3) MG/3ML nebulizer solution  Active   Ivabradine HCl (CORLANOR) 7.5 MG Tab  Active   levothyroxine (SYNTHROID) 75 MCG Tab  Active   lidocaine (LIDODERM) 5 % Patch  Active   Melatonin 5 MG Tab  Active   montelukast (SINGULAIR) 10 MG Tab  Active   mycophenolate (CELLCEPT) 500  "MG tablet  Active   ondansetron (ZOFRAN ODT) 4 MG TABLET DISPERSIBLE  Active   OXcarbazepine (TRILEPTAL) 150 MG Tab  Active   potassium chloride SA (KDUR) 20 MEQ Tab CR  Active   predniSONE (DELTASONE) 10 MG Tab  Active   promethazine (PHENERGAN) 25 MG Suppos  Active   ranitidine (ZANTAC) 300 MG tablet  Active   senna-docusate (PERICOLACE OR SENOKOT S) 8.6-50 MG Tab  Active   tiotropium (SPIRIVA) 18 MCG Cap  Active   traZODone (DESYREL) 100 MG Tab  Active   ziprasidone (GEODON) 40 MG Cap  Active                ALLERGIES  Allergies   Allergen Reactions   • Cefdinir Shortness of Breath and Itching     Tolerated 1/18/17  Tolerates ceftriaxone  Tolerated augmentin 8/2019    • Depakote [Divalproex Sodium] Unspecified     Muscle spasms/muscle pain and weakness     • Doxycycline Anaphylaxis and Vomiting     RXN=unknown   • Amitriptyline Unspecified     Headaches     • Aripiprazole [Abilify] Unspecified     Headaches/muscle twitching     • Clindamycin Nausea     Even with food     • Ees [Erythromycin] Vomiting and Nausea   • Flagyl [Metronidazole Hcl] Unspecified     \"eye problems\"     • Flomax [Tamsulosin Hydrochloride] Swelling   • Levaquin Unspecified     Severe muscle cramps in legs  RXN=unknown   • Metformin Unspecified     Increased lactic acid      • Tape Rash     Tears skin off  coban with Tegaderm tape ok intermittently  RXN=ongoing   • Vancomycin Itching     Pt becomes flushed in face and chest.   RXN=7/10/16   • Wound Dressing Adhesive Hives     By pt report   • Ciprofloxacin    • Depakote  [Divalproex Sodium]    • Erythromycin Rash     .   • Hydromorphone Hcl    • Kdc:Metronidazole+Tartrazine    • Keflex Rash     Pt states she gets a rash with this medication  Tolerates ceftriaxone   • Levofloxacin Unspecified     Leg muscle cramps   • Penicillins        PHYSICAL EXAM  VITAL SIGNS: /81   Pulse 90   Temp 36.4 °C (97.5 °F) (Temporal)   Resp 16   Ht 1.651 m (5' 5\")   Wt 119.3 kg (263 lb)   SpO2 97%   " BMI 43.77 kg/m²   Pulse ox interpretation: I interpret this pulse ox as normal.  Constitutional: Alert in no apparent distress.  HENT: No signs of trauma, Bilateral external ears normal, Nose normal.   Eyes: Pupils are equal and reactive, Conjunctiva normal, Non-icteric.   Neck: Normal range of motion, No tenderness, Supple, No stridor.   Cardiovascular: Regular rate and rhythm.   Thorax & Lungs: Normal breath sounds, No respiratory distress, No wheezing, No chest tenderness.   Abdomen: Bowel sounds normal, Soft, right lower quadrant tenderness, No masses, No pulsatile masses. No peritoneal signs.  Skin: Warm, Dry, No erythema, No rash.   Back: No bony tenderness, No CVA tenderness.   Extremities: Intact distal pulses, No edema, No tenderness, No cyanosis  Musculoskeletal: Good range of motion in all major joints.  Right hip tenderness to palpation or major deformities noted.   Neurologic: Alert, No focal deficits noted.       DIAGNOSTIC STUDIES / PROCEDURES    LABS  Labs Reviewed   CBC WITH DIFFERENTIAL - Abnormal; Notable for the following components:       Result Value    Immature Granulocytes 1.10 (*)     All other components within normal limits   COMP METABOLIC PANEL - Abnormal; Notable for the following components:    Potassium 3.3 (*)     Co2 19 (*)     Glucose 121 (*)     All other components within normal limits   URINALYSIS - Abnormal; Notable for the following components:    Character Cloudy (*)     Leukocyte Esterase Trace (*)     All other components within normal limits   URINE MICROSCOPIC (W/UA) - Abnormal; Notable for the following components:    RBC 20-50 (*)     Bacteria Few (*)     Epithelial Cells Moderate (*)     All other components within normal limits   LIPASE   HCG QUALITATIVE UR   INFLUENZA A/B BY PCR   ESTIMATED GFR       RADIOLOGY  CT-ABDOMEN-PELVIS WITH   Final Result         1.  No acute abnormality.   2.  4.5 cm right ovarian cyst cyst which appears homogeneous.   3.  Subcutaneous  nodular density in the right paramedian abdomen, new since prior study.      DX-CHEST-PORTABLE (1 VIEW)   Final Result         1.  No acute cardiopulmonary disease.          COURSE & MEDICAL DECISION MAKING    Medications   morphine (pf) 4 MG/ML injection 4 mg (4 mg Intravenous Given 2/3/20 7781)   iohexol (OMNIPAQUE) 350 mg/mL (100 mL Intravenous Given 2/3/20 8147)       Pertinent Labs & Imaging studies reviewed. (See chart for details)  29 y.o. female presenting with a variety of complaints.  Primarily she is noting right groin pain starting this evening.  She points to the proximal medial thigh though also states that it is in her hip.  No history of trauma there.  She states that 2 years ago she had a vascular injury when she had a cardiac ablation procedure performed.  She has not had pain in this area in several years.  She has normal pulses in her lower extremities.  No bruising.    The patient has a variety of other symptoms as well including cough and abdominal pain and vomiting and diarrhea.  No active vomiting or diarrhea here in the emergency department.  Patient appears congested and has occasional cough.  Clear breath sounds bilaterally.  She feels like she might have a cold.  Influenza is negative.  Chemistry is unremarkable.  Chest x-ray without evidence of pneumonia or pulmonary edema.  CT abdomen pelvis was ordered for further evaluation of the patient's abdominal pain symptoms and nausea and vomiting.  Has a ovarian cyst.    Patient was reevaluated at bedside and informed of the results.  She was treated with IV analgesic medications and feels much improved.  Continues have no vomiting here in the emergency department.  She has had a history of cysts to the ovaries in the past.  Low suspicion for torsion in this setting.  Recommending that she follow-up with a gynecologist regarding this finding.  Unclear as to the actual root cause of the patient's groin pain.  Low suspicion for referred pain  "from the ovarian cyst.  She is pointing to her medial thigh.  Does not appear to have vascular compromise at this time.    No acute abnormality or emergency pathology was identified requiring further inpatient management.  Patient appears stable for discharge.  She is agreeable with the discharge plan and feels much improved and will follow up with a primary care physician and gynecologist on an outpatient basis.  She was provided with return precautions which she reports understanding.  To return in the next day or 2 should she have any worsening of her symptoms or development of any other concerning signs or symptoms    The patient was instructed to follow-up with primary care physician for further management.  To return immediately for any worsening symptoms or development of any other concerning signs or symptoms. The patient verbalizes understanding in their own words.    /69   Pulse 70   Temp 36.4 °C (97.5 °F) (Temporal)   Resp 18   Ht 1.651 m (5' 5\")   Wt 119.3 kg (263 lb)   SpO2 99%   BMI 43.77 kg/m²     The patient was referred to primary care where they will receive further BP management.      Torres Brody M.D.  [address] [place] [id number] [phone]    Schedule an appointment as soon as possible for a visit in 2 days      [facility], Emergency Dept  [address] [facility] [address] [phone]    As needed, If symptoms worsen      FINAL IMPRESSION  1. Right inguinal pain    2. Cough    3. Viral URI    4. Cyst of right ovary            Electronically signed by: Laron Vela M.D., 2/3/2020 2:48 AM    "

## 2020-02-03 NOTE — DISCHARGE PLANNING
Medical Social Work     Referral: [facility] transportation    JACOB received a call from the RN requesting JACOB assistance with [facility] transportation home for the pt. JACOB verified the address with the RN of [place] [address]. Jacob called [facility] and obtain authorization. JACOB faxed a PCS to [facility] and set up transportation with Jorge for 0700. RN aware of transport time.     Plan: JACOB will remain available for pt support.

## 2020-02-03 NOTE — ED NOTES
Kip from Lab called with critical result of platelet at 24. Critical lab result read back to Kip.   Dr. Vela notified of critical lab result at 0322.  Critical lab result read back by Dr. Vela.

## 2020-02-04 VITALS
OXYGEN SATURATION: 96 % | HEIGHT: 65 IN | RESPIRATION RATE: 18 BRPM | TEMPERATURE: 98.4 F | BODY MASS INDEX: 43.82 KG/M2 | WEIGHT: 263 LBS | HEART RATE: 85 BPM | SYSTOLIC BLOOD PRESSURE: 129 MMHG | DIASTOLIC BLOOD PRESSURE: 73 MMHG

## 2020-02-04 NOTE — ED TRIAGE NOTES
"Pt comes in complaining of groin pain, possible ovarian cyst, however her GYN told her there was \"no way it was a cyst\" Pt stating the pain is moving. Pt reporting lower right side abd pain. Recent d/c this morning for the same.   "

## 2020-02-04 NOTE — ED PROVIDER NOTES
ER Provider Note     Scribed for Noman Zavala M.D. by Aiyana Mosher. 2/3/2020, 8:27 PM.    Primary Care Provider: Torres Brody M.D.  Means of Arrival: walk in   History obtained from: Patient  History limited by: None     CHIEF COMPLAINT  Chief Complaint   Patient presents with   • RLQ Pain   • Groin Pain       HPI  Kristin Balderrama is a 29 y.o. female who presents to the Emergency Department with cold, swollen, painful right leg. She also has associated symptoms of swelling at her right groin and color changes to the skin of her right leg. The patient states she continues to have the abdominal pain she was evaluated for yesterday as well. 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      REVIEW OF SYSTEMS  See HPI for further details. All other systems are negative.     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    SURGICAL HISTORY   has a past surgical history that includes neuro dest facet l/s w/ig sngl (4/21/2015);  recovery (1/27/2016); delmar by laparoscopy (8/29/2010); lumbar fusion anterior (8/21/2012); other cardiac surgery (1/2016); tonsillectomy; bowel stimulator insertion (7/13/2016); gastroscopy with balloon dilatation (N/A, 1/4/2017); muscle biopsy (Right, 1/26/2017); other abdominal surgery; and laminotomy.    SOCIAL HISTORY  Social History     Tobacco Use   • Smoking status: Never Smoker   • Smokeless tobacco: Never Used   Substance Use Topics   • Alcohol use: No     Alcohol/week: 0.0 oz   • Drug use: Not Currently     Frequency: 7.0 times per week     Types: Marijuana, Oral     Comment: Medicinal edible's      Social History     Substance and Sexual Activity   Drug Use Not Currently   • Frequency: 7.0 times per week   • Types: Marijuana, Oral    Comment: Medicinal edible's       FAMILY HISTORY  Family History   Problem Relation Age of Onset   • Hypertension Mother    • Sleep Apnea Mother    • Heart Disease Mother    • Other Mother         hypothryod   • Hypertension Maternal Uncle    • Heart Disease Maternal Grandmother    • Hypertension Maternal Grandmother    • No Known Problems Sister    • Other Sister         Narcolepsy;fibromyalsia;bone;nerve   • Genitourinary () Problems Sister         endometriosis       CURRENT MEDICATIONS  Home Medications     Reviewed by Mavis Abdul R.N. (Registered Nurse) on 02/03/20 at 1650  Med List Status: <None>   Medication Last Dose Status   acetaZOLAMIDE SR (DIAMOX) 500 MG CAPSULE SR 12 HR  Active   albuterol 108 (90 Base) MCG/ACT Aero Soln inhalation aerosol  Active   aspirin EC (ECOTRIN) 81 MG Tablet Delayed Response  Active   budesonide-formoterol (SYMBICORT) 160-4.5 MCG/ACT Aerosol  Active   busPIRone (BUSPAR) 10 MG Tab tablet  Active   Dextrose, Diabetic Use, (GLUCOSE) 4 g chewable tablet  Active   Diclofenac Sodium (VOLTAREN) 1 % Gel  Active   Dulaglutide (TRULICITY) 1.5 MG/0.5ML Solution Pen-injector  Active   etonogestrel (NEXPLANON) 68 MG Implant implant  Active  "  fluoxetine (PROZAC) 40 MG capsule  Active   folic acid (FOLVITE) 800 MCG tablet  Active   furosemide (LASIX) 80 MG Tab  Active   gabapentin (NEURONTIN) 100 MG Cap  Active   ipratropium-albuterol (DUONEB) 0.5-2.5 (3) MG/3ML nebulizer solution  Active   Ivabradine HCl (CORLANOR) 7.5 MG Tab  Active   levothyroxine (SYNTHROID) 75 MCG Tab  Active   lidocaine (LIDODERM) 5 % Patch  Active   Melatonin 5 MG Tab  Active   montelukast (SINGULAIR) 10 MG Tab  Active   mycophenolate (CELLCEPT) 500 MG tablet  Active   ondansetron (ZOFRAN ODT) 4 MG TABLET DISPERSIBLE  Active   OXcarbazepine (TRILEPTAL) 150 MG Tab  Active   potassium chloride SA (KDUR) 20 MEQ Tab CR  Active   predniSONE (DELTASONE) 10 MG Tab  Active   promethazine (PHENERGAN) 25 MG Suppos  Active   ranitidine (ZANTAC) 300 MG tablet  Active   senna-docusate (PERICOLACE OR SENOKOT S) 8.6-50 MG Tab  Active   tiotropium (SPIRIVA) 18 MCG Cap  Active   traZODone (DESYREL) 100 MG Tab  Active   ziprasidone (GEODON) 40 MG Cap  Active                ALLERGIES  Allergies   Allergen Reactions   • Cefdinir Shortness of Breath and Itching     Tolerated 1/18/17  Tolerates ceftriaxone  Tolerated augmentin 8/2019    • Depakote [Divalproex Sodium] Unspecified     Muscle spasms/muscle pain and weakness     • Doxycycline Anaphylaxis and Vomiting     RXN=unknown   • Amitriptyline Unspecified     Headaches     • Aripiprazole [Abilify] Unspecified     Headaches/muscle twitching     • Clindamycin Nausea     Even with food     • Ees [Erythromycin] Vomiting and Nausea   • Flagyl [Metronidazole Hcl] Unspecified     \"eye problems\"     • Flomax [Tamsulosin Hydrochloride] Swelling   • Levaquin Unspecified     Severe muscle cramps in legs  RXN=unknown   • Metformin Unspecified     Increased lactic acid      • Tape Rash     Tears skin off  coban with Tegaderm tape ok intermittently  RXN=ongoing   • Vancomycin Itching     Pt becomes flushed in face and chest.   RXN=7/10/16   • Wound Dressing " "Adhesive Hives     By pt report   • Ciprofloxacin    • Depakote  [Divalproex Sodium]    • Erythromycin Rash     .   • Hydromorphone Hcl    • Kdc:Metronidazole+Tartrazine    • Keflex Rash     Pt states she gets a rash with this medication  Tolerates ceftriaxone   • Levofloxacin Unspecified     Leg muscle cramps   • Penicillins        PHYSICAL EXAM  VITAL SIGNS: /117   Pulse 91   Temp 36.9 °C (98.4 °F) (Oral)   Resp 18   Ht 1.651 m (5' 5\")   Wt 119.3 kg (263 lb)   SpO2 94%   BMI 43.77 kg/m²      Constitutional: Alert in mild to moderate pain and distress.   HENT: No signs of trauma, Bilateral external ears normal, Nose normal.   Eyes: Pupils are equal and reactive, Conjunctiva normal, Non-icteric.   Neck: Normal range of motion, No tenderness, Supple, No stridor.   Lymphatic: No lymphadenopathy noted.   Cardiovascular: Regular rate and rhythm, no murmurs.   Thorax & Lungs: Normal breath sounds, No respiratory distress, No wheezing, No chest tenderness.   Abdomen: Bowel sounds normal, Soft with right lower quadrant tenderness to palpation. No masses, No pulsatile masses. No peritoneal signs.  Skin: Warm, Dry, No erythema, No rash.   Back: No bony tenderness, No CVA tenderness.   Extremities: Intact distal pulses, No edema, No tenderness, No cyanosis.  Musculoskeletal: Good range of motion in all major joints. No tenderness to palpation or major deformities noted.   Neurologic: Alert , Normal motor function, Normal sensory function, No focal deficits noted.   Psychiatric: Affect normal, Judgment normal, Mood normal.     DIAGNOSTIC STUDIES / PROCEDURES    LABS  Labs Reviewed   CBC WITH DIFFERENTIAL - Abnormal; Notable for the following components:       Result Value    MCHC 33.4 (*)     All other components within normal limits   COMP METABOLIC PANEL - Abnormal; Notable for the following components:    Potassium 3.5 (*)     All other components within normal limits   LIPASE   HCG QUAL SERUM   ESTIMATED GFR "   URINE CULTURE(NEW)    Narrative:     Indication for culture:->Patient WITHOUT an indwelling Andrade  catheter in place with new onset of Dysuria, Frequency,  Urgency, and/or Suprapubic pain     All labs reviewed by me.    RADIOLOGY  CT-CTA LOWER EXT WITH & W/O-POST PROCESS RIGHT   Final Result      1.  Anterior tibial and peroneal arteries are not visualized distally, which may be due to their small caliber. Stenosis or occlusion of these arteries is not excluded.   2.  No other evidence of high-grade stenosis, occlusion or aneurysm of the major arteries of the right lower extremity.         US-PELVIC COMPLETE (TRANSABDOMINAL/TRANSVAGINAL) (COMBO)   Final Result      1.  Minimally complicated right adnexal cyst.   2.  Normal uterus and left ovary.            The radiologist's interpretation of all radiological studies have been reviewed by me.    COURSE & MEDICAL DECISION MAKING  Pertinent Labs & Imaging studies reviewed. (See chart for details)    This is a 29 y.o. female that presents with right lower quadrant pain as well as radiation to her right leg.  She says she has terrible cramping and pain in this leg.  We will evaluate her for limb ischemia given the history of aneurysm.  She does have palpable dorsalis pedis and posterior tibialis pulse with brisk capillary refill.  She had a CT scan of her abdomen to rule out appendicitis with her today.  We will rule out torsion with a pelvic ultrasound.     8:27 PM - Patient seen and examined at bedside. Ordered CTA lower extremity, ultra sounds pelvic complete, lipase, UA, HCG Qual serum, CBC with differential, and CMP.     Patient has a right adnexal cyst.  In addition she has CT scan that is negative for occlusion however the anterior tibial and peroneal arteries are not visualized distally.  Given the fact that she has brisk capillary refill and palpable pulses I spoke to the radiologist and he does not feel that there is true occlusion there.  Will discharge home  with strict return precautions and follow-up.  She has no other significant lab or electrolyte derangement.    FINAL IMPRESSION  1. Abdominal pain, unspecified abdominal location    2. Pain of right lower extremity          IAiyana (Scribe), am scribing for, and in the presence of, Noman Zavala M.D..    Electronically signed by: Aiyana Mosher (Scribe), 2/3/2020    INoman M.D. personally performed the services described in this documentation, as scribed by Aiyana Mosher in my presence, and it is both accurate and complete.     C    The note accurately reflects work and decisions made by me.  Noman Zavala M.D.  2/4/2020  12:27 AM

## 2020-02-04 NOTE — ED NOTES
Patient informed ER staff that the pain is getting worse. Wondering about how much longer. States pain is a 9 out of 10. Vitals rechecked. Triage RN aware.

## 2020-02-04 NOTE — ED NOTES
.Pt given d/c instructions. Pt able to illustrate understanding as evidence by verbal feed-back of information given. Pt is stable for d/c at present time. Pt has no further concerns at present time. Pt escorted by wheelchair

## 2020-02-06 ENCOUNTER — OFFICE VISIT (OUTPATIENT)
Dept: MEDICAL GROUP | Facility: MEDICAL CENTER | Age: 30
End: 2020-02-06
Payer: MEDICARE

## 2020-02-06 VITALS
TEMPERATURE: 98.6 F | RESPIRATION RATE: 16 BRPM | WEIGHT: 262 LBS | BODY MASS INDEX: 43.65 KG/M2 | OXYGEN SATURATION: 91 % | DIASTOLIC BLOOD PRESSURE: 76 MMHG | HEIGHT: 65 IN | SYSTOLIC BLOOD PRESSURE: 124 MMHG | HEART RATE: 105 BPM

## 2020-02-06 DIAGNOSIS — B37.0 THRUSH: ICD-10-CM

## 2020-02-06 DIAGNOSIS — G93.40 ENCEPHALOPATHY: ICD-10-CM

## 2020-02-06 DIAGNOSIS — Z79.899 POLYPHARMACY: ICD-10-CM

## 2020-02-06 LAB
BACTERIA UR CULT: NORMAL
SIGNIFICANT IND 70042: NORMAL
SITE SITE: NORMAL
SOURCE SOURCE: NORMAL

## 2020-02-06 PROCEDURE — 99214 OFFICE O/P EST MOD 30 MIN: CPT | Performed by: INTERNAL MEDICINE

## 2020-02-06 RX ORDER — ACETAZOLAMIDE 500 MG/1
500 CAPSULE, EXTENDED RELEASE ORAL DAILY
Qty: 30 CAP | Refills: 1 | Status: ON HOLD
Start: 2020-02-06 | End: 2020-02-25

## 2020-02-06 RX ORDER — OXCARBAZEPINE 150 MG/1
150 TABLET, FILM COATED ORAL 2 TIMES DAILY
Qty: 60 TAB | Refills: 2 | Status: SHIPPED | OUTPATIENT
Start: 2020-02-06 | End: 2020-06-25

## 2020-02-06 NOTE — PROGRESS NOTES
CC: Sore throat, hospital follow-up encephalopathy and polypharmacy.                                                                                                                                      HPI:   Kristin presents today with the following.    1.  Sore throat  Presents complaining of sore throat since being discharged from [facility] reporting white exudate on the tongue and difficulty swallowing.  No fevers or chills she does have a cough but it is nonproductive.    2. Encephalopathy/Polypharmacy  Hospitalized for confusion was found to have elevated opening pressures was started on Diamox.  Denying any headaches requesting refills for medication being titrated off gabapentin still taking along with Lyrica as well as Trileptal.  She does not have refills of Diamox or Trileptal.      Patient Active Problem List    Diagnosis Date Noted   • Advanced care planning/counseling discussion 12/20/2019     Priority: High   • Breast wound 11/06/2017     Priority: High   • Polypharmacy 06/27/2016     Priority: High   • Weakness of both lower extremities 06/22/2016     Priority: High   • Bilateral heel pain secondary to calcaneal bone spurs 03/28/2016     Priority: High   • TONYA (obstructive sleep apnea) 01/09/2018     Priority: Medium   • Morbidly obese (Formerly [facility]) 03/07/2016     Priority: Medium   • H/O prior ablation treatment 02/10/2016     Priority: Medium   • Immunocompromised (Formerly [facility]) 11/06/2019     Priority: Low   • History of atrial fibrillation and cardiomyopathy? 11/06/2019     Priority: Low   • Moderate intermittent asthma without complication 06/20/2019     Priority: Low   • Optic neuritis 05/27/2019     Priority: Low   • Acquired hypothyroidism 08/04/2017     Priority: Low   • Type 2 diabetes mellitus with hyperglycemia, without long-term current use of insulin (Formerly [facility]) 04/26/2017     Priority: Low   • Depression 10/28/2016     Priority: Low   • Schizophrenia (Formerly [facility]) 10/27/2016     Priority: Low   • GERD  (gastroesophageal reflux disease) 03/07/2016     Priority: Low   • Peripheral neuropathy and chornic pain syndrome (CMS-HCC) 03/06/2016     Priority: Low   • Fatty liver disease, nonalcoholic 01/19/2015     Priority: Low   • Anxiety 12/16/2014     Priority: Low   • Knee pain, right 02/13/2014     Priority: Low   • Neurogenic bladder 04/02/2011     Priority: Low   • Borderline personality disorder in adult (HCC) 09/18/2010     Priority: Low   • Seth hematuria 12/27/2019   • Stress incontinence 12/27/2019   • Mixed stress and urge urinary incontinence 12/27/2019   • Overflow incontinence of urine 12/27/2019   • Urge incontinence of urine 12/27/2019   • Increased frequency of urination 12/27/2019   • History of optic neuritis 12/17/2019   • Mastoiditis 12/16/2019   • Mild intermittent asthma without complication 12/16/2019   • Closed head injury 11/30/2019   • Hypocalcemia 11/30/2019   • Debility 09/30/2019   • Microscopic hematuria 06/13/2019   • Palpitations 10/01/2018   • Functional diarrhea 01/05/2018   • Bowel and bladder incontinence 10/27/2016   • Galactorrhea 07/22/2016   • Scoliosis 03/07/2016       Current Outpatient Medications   Medication Sig Dispense Refill   • acetaZOLAMIDE SR (DIAMOX) 500 MG CAPSULE SR 12 HR Take 1 Cap by mouth every day. 30 Cap 1   • OXcarbazepine (TRILEPTAL) 150 MG Tab Take 1 Tab by mouth 2 Times a Day. 60 Tab 2   • nystatin (MYCOSTATIN) 886741 UNIT/ML Suspension Take 5 mL by mouth 4 times a day. 200 mL 1   • promethazine (PHENERGAN) 25 MG Suppos Insert 1 Suppository in rectum every 6 hours as needed for Nausea/Vomiting. 10 Suppository 0   • ziprasidone (GEODON) 40 MG Cap Take 1 Cap by mouth 2 Times a Day. 60 Cap 0   • traZODone (DESYREL) 100 MG Tab Take 0.5 Tabs by mouth every evening. 30 Tab 3   • lidocaine (LIDODERM) 5 % Patch Apply 1 Patch to skin as directed every 24 hours. 10 Patch    • senna-docusate (PERICOLACE OR SENOKOT S) 8.6-50 MG Tab Take 2 Tabs by mouth 2 times a day as  needed for Constipation.     • Dextrose, Diabetic Use, (GLUCOSE) 4 g chewable tablet Take 4 Tabs by mouth as needed for Low Blood Sugar (If FSBG is less than or equal to 70 mg/dL and patient able to eat or drink). 30 Tab    • gabapentin (NEURONTIN) 100 MG Cap Take 1 Cap by mouth 4 times a day.     • predniSONE (DELTASONE) 10 MG Tab Take 10 mg by mouth every day.     • budesonide-formoterol (SYMBICORT) 160-4.5 MCG/ACT Aerosol Inhale 2 Puffs by mouth 2 Times a Day.     • fluoxetine (PROZAC) 40 MG capsule Take 40 mg by mouth every day.     • aspirin EC (ECOTRIN) 81 MG Tablet Delayed Response Take 81 mg by mouth every day.     • busPIRone (BUSPAR) 10 MG Tab tablet Take 10 mg by mouth 2 times a day.     • levothyroxine (SYNTHROID) 75 MCG Tab Take 75 mcg by mouth Every morning on an empty stomach.     • montelukast (SINGULAIR) 10 MG Tab Take 10 mg by mouth every day.     • ondansetron (ZOFRAN ODT) 4 MG TABLET DISPERSIBLE Take 4 mg by mouth every 6 hours as needed for Nausea.     • potassium chloride SA (KDUR) 20 MEQ Tab CR Take 20 mEq by mouth 2 times a day.     • ranitidine (ZANTAC) 300 MG tablet Take 300 mg by mouth every morning.     • Diclofenac Sodium (VOLTAREN) 1 % Gel Apply 1 Application to skin as directed 4 times a day as needed (on wrists, back, ankles, and feet).     • tiotropium (SPIRIVA) 18 MCG Cap Inhale 1 Cap by mouth every day. 90 Cap 3   • folic acid (FOLVITE) 800 MCG tablet Take 800 mg by mouth every day.     • Ivabradine HCl (CORLANOR) 7.5 MG Tab Take 7.5 mg by mouth 2 Times a Day.     • ipratropium-albuterol (DUONEB) 0.5-2.5 (3) MG/3ML nebulizer solution 3 mL by Nebulization route every 6 hours as needed for Shortness of Breath. 60 Bullet 1   • etonogestrel (NEXPLANON) 68 MG Implant implant Inject 1 Each as instructed Once.     • mycophenolate (CELLCEPT) 500 MG tablet Take 1,000 mg by mouth 2 times a day.     • Dulaglutide (TRULICITY) 1.5 MG/0.5ML Solution Pen-injector Inject 1.5 mg as instructed every  "Friday.     • albuterol 108 (90 Base) MCG/ACT Aero Soln inhalation aerosol Inhale 2 Puffs by mouth every 6 hours as needed for Shortness of Breath.     • Melatonin 5 MG Tab Take 10 mg by mouth every evening.     • furosemide (LASIX) 80 MG Tab Take 80 mg by mouth every day.       No current facility-administered medications for this visit.          Allergies as of 02/06/2020 - Reviewed 02/06/2020   Allergen Reaction Noted   • Cefdinir Shortness of Breath and Itching 03/01/2016   • Depakote [divalproex sodium] Unspecified 06/14/2010   • Doxycycline Anaphylaxis and Vomiting 08/15/2012   • Amitriptyline Unspecified 10/31/2013   • Aripiprazole [abilify] Unspecified 01/17/2013   • Clindamycin Nausea 02/02/2011   • Ees [erythromycin] Vomiting and Nausea 08/28/2010   • Flagyl [metronidazole hcl] Unspecified 03/31/2011   • Flomax [tamsulosin hydrochloride] Swelling 09/24/2009   • Levaquin Unspecified 10/31/2013   • Metformin Unspecified 07/23/2013   • Tape Rash 08/15/2012   • Vancomycin Itching 07/10/2016   • Wound dressing adhesive Hives 01/12/2018   • Ciprofloxacin  01/16/2020   • Depakote  [divalproex sodium]  01/16/2020   • Erythromycin Rash 03/30/2017   • Hydromorphone hcl  01/16/2020   • Kdc:metronidazole+tartrazine  01/16/2020   • Keflex Rash 01/01/2017   • Levofloxacin Unspecified 10/27/2016   • Penicillins  01/16/2020        ROS: Denies Chest pain, fevers, LE edema.    /76 (BP Location: Left arm)   Pulse (!) 105   Temp 37 °C (98.6 °F)   Resp 16   Ht 1.651 m (5' 5\")   Wt 118.8 kg (262 lb)   SpO2 91%   BMI 43.60 kg/m²     Physical Exam:  Gen:         Alert and oriented, No apparent distress.  Op            white exudates on the tongue.  Neck:        No Lymphadenopathy or Bruits.  Lungs:     Clear to auscultation bilaterally  CV:          Regular rate and rhythm. No murmurs, rubs or gallops.               Ext:          No clubbing, cyanosis, edema.      Assessment and Plan.   29 y.o. female with the " following issues.    1. Thrush  Mouth consistent with thrush placing on nystatin swish and swallow rinsing her mouth after inhalers which she did not do well hospitalized.    2. Encephalopathy/. Polypharmacy  Refilled Diamox encouraged to get into neurology as well as psychiatry.  We will continue titrating down off of gabapentin she will stop the medication in 2 weeks after cutting in half for a week.

## 2020-02-11 RX ORDER — BUSPIRONE HYDROCHLORIDE 10 MG/1
TABLET ORAL
Qty: 60 TAB | Refills: 0 | Status: SHIPPED | OUTPATIENT
Start: 2020-02-11 | End: 2020-04-17

## 2020-02-14 ENCOUNTER — HOSPITAL ENCOUNTER (EMERGENCY)
Facility: MEDICAL CENTER | Age: 30
End: 2020-02-14
Attending: EMERGENCY MEDICINE
Payer: MEDICARE

## 2020-02-14 ENCOUNTER — APPOINTMENT (OUTPATIENT)
Dept: RADIOLOGY | Facility: MEDICAL CENTER | Age: 30
End: 2020-02-14
Attending: EMERGENCY MEDICINE
Payer: MEDICARE

## 2020-02-14 VITALS
RESPIRATION RATE: 14 BRPM | HEIGHT: 65 IN | DIASTOLIC BLOOD PRESSURE: 76 MMHG | OXYGEN SATURATION: 96 % | BODY MASS INDEX: 43.65 KG/M2 | WEIGHT: 262 LBS | HEART RATE: 80 BPM | SYSTOLIC BLOOD PRESSURE: 124 MMHG | TEMPERATURE: 98.1 F

## 2020-02-14 DIAGNOSIS — R06.00 DYSPNEA, UNSPECIFIED TYPE: ICD-10-CM

## 2020-02-14 DIAGNOSIS — R06.2 WHEEZING: ICD-10-CM

## 2020-02-14 LAB
ALBUMIN SERPL BCP-MCNC: 4.2 G/DL (ref 3.2–4.9)
ALBUMIN/GLOB SERPL: 2.1 G/DL
ALP SERPL-CCNC: 61 U/L (ref 30–99)
ALT SERPL-CCNC: 20 U/L (ref 2–50)
ANION GAP SERPL CALC-SCNC: 8 MMOL/L (ref 0–11.9)
AST SERPL-CCNC: 11 U/L (ref 12–45)
BASOPHILS # BLD AUTO: 0.6 % (ref 0–1.8)
BASOPHILS # BLD: 0.04 K/UL (ref 0–0.12)
BILIRUB SERPL-MCNC: 0.2 MG/DL (ref 0.1–1.5)
BUN SERPL-MCNC: 10 MG/DL (ref 8–22)
CALCIUM SERPL-MCNC: 8.9 MG/DL (ref 8.5–10.5)
CHLORIDE SERPL-SCNC: 112 MMOL/L (ref 96–112)
CO2 SERPL-SCNC: 18 MMOL/L (ref 20–33)
CREAT SERPL-MCNC: 0.75 MG/DL (ref 0.5–1.4)
EOSINOPHIL # BLD AUTO: 0.08 K/UL (ref 0–0.51)
EOSINOPHIL NFR BLD: 1.2 % (ref 0–6.9)
ERYTHROCYTE [DISTWIDTH] IN BLOOD BY AUTOMATED COUNT: 46 FL (ref 35.9–50)
GLOBULIN SER CALC-MCNC: 2 G/DL (ref 1.9–3.5)
GLUCOSE SERPL-MCNC: 125 MG/DL (ref 65–99)
HCG SERPL QL: NEGATIVE
HCT VFR BLD AUTO: 38.7 % (ref 37–47)
HGB BLD-MCNC: 12.8 G/DL (ref 12–16)
IMM GRANULOCYTES # BLD AUTO: 0.03 K/UL (ref 0–0.11)
IMM GRANULOCYTES NFR BLD AUTO: 0.4 % (ref 0–0.9)
LYMPHOCYTES # BLD AUTO: 1.8 K/UL (ref 1–4.8)
LYMPHOCYTES NFR BLD: 26.3 % (ref 22–41)
MCH RBC QN AUTO: 30 PG (ref 27–33)
MCHC RBC AUTO-ENTMCNC: 33.1 G/DL (ref 33.6–35)
MCV RBC AUTO: 90.6 FL (ref 81.4–97.8)
MONOCYTES # BLD AUTO: 0.53 K/UL (ref 0–0.85)
MONOCYTES NFR BLD AUTO: 7.7 % (ref 0–13.4)
NEUTROPHILS # BLD AUTO: 4.36 K/UL (ref 2–7.15)
NEUTROPHILS NFR BLD: 63.8 % (ref 44–72)
NRBC # BLD AUTO: 0 K/UL
NRBC BLD-RTO: 0 /100 WBC
PLATELET # BLD AUTO: 147 K/UL (ref 164–446)
PMV BLD AUTO: 11.8 FL (ref 9–12.9)
POTASSIUM SERPL-SCNC: 3.7 MMOL/L (ref 3.6–5.5)
PROT SERPL-MCNC: 6.2 G/DL (ref 6–8.2)
RBC # BLD AUTO: 4.27 M/UL (ref 4.2–5.4)
S PYO DNA SPEC NAA+PROBE: NOT DETECTED
SODIUM SERPL-SCNC: 138 MMOL/L (ref 135–145)
WBC # BLD AUTO: 6.8 K/UL (ref 4.8–10.8)

## 2020-02-14 PROCEDURE — 70491 CT SOFT TISSUE NECK W/DYE: CPT

## 2020-02-14 PROCEDURE — 700101 HCHG RX REV CODE 250: Performed by: EMERGENCY MEDICINE

## 2020-02-14 PROCEDURE — 85025 COMPLETE CBC W/AUTO DIFF WBC: CPT

## 2020-02-14 PROCEDURE — 99285 EMERGENCY DEPT VISIT HI MDM: CPT

## 2020-02-14 PROCEDURE — 80053 COMPREHEN METABOLIC PANEL: CPT

## 2020-02-14 PROCEDURE — A9270 NON-COVERED ITEM OR SERVICE: HCPCS | Performed by: EMERGENCY MEDICINE

## 2020-02-14 PROCEDURE — 71260 CT THORAX DX C+: CPT

## 2020-02-14 PROCEDURE — 87651 STREP A DNA AMP PROBE: CPT

## 2020-02-14 PROCEDURE — 700117 HCHG RX CONTRAST REV CODE 255: Performed by: EMERGENCY MEDICINE

## 2020-02-14 PROCEDURE — 700111 HCHG RX REV CODE 636 W/ 250 OVERRIDE (IP): Performed by: EMERGENCY MEDICINE

## 2020-02-14 PROCEDURE — 94640 AIRWAY INHALATION TREATMENT: CPT

## 2020-02-14 PROCEDURE — 700102 HCHG RX REV CODE 250 W/ 637 OVERRIDE(OP): Performed by: EMERGENCY MEDICINE

## 2020-02-14 PROCEDURE — 96374 THER/PROPH/DIAG INJ IV PUSH: CPT | Mod: XU

## 2020-02-14 PROCEDURE — 84703 CHORIONIC GONADOTROPIN ASSAY: CPT

## 2020-02-14 RX ORDER — ACETAMINOPHEN 325 MG/1
650 TABLET ORAL ONCE
Status: COMPLETED | OUTPATIENT
Start: 2020-02-14 | End: 2020-02-14

## 2020-02-14 RX ORDER — DEXAMETHASONE SODIUM PHOSPHATE 10 MG/ML
16 INJECTION, SOLUTION INTRAMUSCULAR; INTRAVENOUS ONCE
Status: COMPLETED | OUTPATIENT
Start: 2020-02-14 | End: 2020-02-14

## 2020-02-14 RX ADMIN — ACETAMINOPHEN 650 MG: 325 TABLET, FILM COATED ORAL at 03:22

## 2020-02-14 RX ADMIN — IOHEXOL 80 ML: 350 INJECTION, SOLUTION INTRAVENOUS at 02:20

## 2020-02-14 RX ADMIN — DEXAMETHASONE SODIUM PHOSPHATE 16 MG: 10 INJECTION INTRAMUSCULAR; INTRAVENOUS at 01:05

## 2020-02-14 RX ADMIN — ALBUTEROL SULFATE 2.5 MG: 2.5 SOLUTION RESPIRATORY (INHALATION) at 01:34

## 2020-02-14 ASSESSMENT — ENCOUNTER SYMPTOMS
DIZZINESS: 0
DIARRHEA: 1
SORE THROAT: 1
VOMITING: 1
COUGH: 1
SHORTNESS OF BREATH: 1

## 2020-02-14 ASSESSMENT — LIFESTYLE VARIABLES: DO YOU DRINK ALCOHOL: NO

## 2020-02-14 NOTE — ED NOTES
Pt medicated per MAR. Discussed POC with Pt. Pt verbalizes understanding. Pt denies any needs at this time. Pt's call light is within reach and bed is in low locked position.

## 2020-02-14 NOTE — ED NOTES
Pt sent with [facility] medical transport with all belongings and discharge packet. Education provided to on discharge instructions, follow up instructions. Pt verbalized understanding. Pt dressed and taken out on gurney with [facility] in stable condition.

## 2020-02-14 NOTE — ED PROVIDER NOTES
"ED Provider Note    Scribed for Ignacia Coon M.D. by Ramona Ruiz. 2/14/2020, 12:35 AM.    Primary care provider: Torres Brody M.D.  Means of arrival: Ambulance  History obtained from: Patient  History limited by: None    CHIEF COMPLAINT  Chief Complaint   Patient presents with   • Shortness of Breath     x5 days   • Wheezing     HPI  Kristin Balderrama is a 29 y.o. female who presents to the Emergency Department via ambulance for shortness of breath that began 5 days ago. The patient states it is \"hard to talk\" and it \"feels like her throat is closing.\" She states she has a history of asthma and did multiple treatments for her asthma but did not feel improved.  The patient reports that with this illness she has also intermittently coughed up blood.  Additionally, the patient states she has been sick with diarrhea and vomiting for three weeks however this seems to be improving and she is mostly concerned about her shortness of breath today.    Patient complains of left nipple discharge. She states this is ongoing and [facility]     REVIEW OF SYSTEMS  Review of Systems   HENT: Positive for sore throat.    Respiratory: Positive for cough (Blood production) and shortness of breath.    Cardiovascular: Negative for chest pain.   Gastrointestinal: Positive for diarrhea and vomiting.   Skin:        Discharge from left nipple   Neurological: Negative for dizziness.   All other systems reviewed and are negative.    PAST MEDICAL HISTORY   has a past medical history of Abdominal pain, Anginal syndrome, Apnea, sleep, Arrhythmia, Arthritis, ASTHMA, Atrial fibrillation (HCC), Back pain, Borderline personality disorder (HCC), Breath shortness, Bronchitis, Cardiac arrhythmia, Chickenpox, Chronic UTI (9/18/2010), Cough, Daytime sleepiness, Depression, Diabetes (HCC), Diarrhea, Disorder of thyroid, Fall, Fatigue, Frequent headaches, Gasping for breath, Gynecological " disorder, Headache(784.0), Heart burn, History of falling, Hypertension, Indigestion, Migraine, 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HCC), 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HCC), Nausea, Obesity, Pain (08-15-12), Painful joint, PCOS (polycystic ovarian syndrome), Pneumonia (2012), Psychosis (HCC), Renal disorder, Ringing in ears, Scoliosis, Shortness of breath, Sinus tachycardia (10/31/2013), Sleep apnea, Snoring, Tonsillitis, Tuberculosis, Urinary bladder disorder, Urinary incontinence, Weakness, and Wears glasses.    SURGICAL HISTORY   has a past surgical history that includes neuro dest facet l/s w/ig sngl (4/21/2015); recovery (1/27/2016); delmar by laparoscopy (8/29/2010); lumbar fusion anterior (8/21/2012); other cardiac surgery (1/2016); tonsillectomy; bowel stimulator insertion (7/13/2016); gastroscopy with balloon dilatation (N/A, 1/4/2017); muscle biopsy (Right, 1/26/2017); other abdominal surgery; and laminotomy.    SOCIAL HISTORY  Social History     Tobacco Use   • Smoking status: Never Smoker   • Smokeless tobacco: Never Used   Substance Use Topics   • Alcohol use: No     Alcohol/week: 0.0 oz   • Drug use: Not Currently     Frequency: 7.0 times per week     Types: Marijuana, Oral     Comment: Medicinal edible's      Social History     Substance and Sexual Activity   Drug Use Not Currently   • Frequency: 7.0 times per week   • Types: Marijuana, Oral    Comment: Medicinal edible's     FAMILY HISTORY  Family History   Problem Relation Age of Onset   • Hypertension Mother    • Sleep Apnea Mother    • Heart Disease Mother    • Other Mother         hypothryod   • Hypertension Maternal Uncle    • Heart Disease Maternal Grandmother    • Hypertension Maternal Grandmother    • No Known Problems Sister    • Other Sister         Narcolepsy;fibromyalsia;bone;nerve   • Genitourinary () Problems Sister         endometriosis     CURRENT MEDICATIONS  Current Outpatient Medications:   •  busPIRone (BUSPAR) 10 MG Tab tablet, TAKE ONE  TABLET BY MOUTH TWICE DAILY, Disp: 60 Tab, Rfl: 0  •  acetaZOLAMIDE SR (DIAMOX) 500 MG CAPSULE SR 12 HR, Take 1 Cap by mouth every day., Disp: 30 Cap, Rfl: 1  •  OXcarbazepine (TRILEPTAL) 150 MG Tab, Take 1 Tab by mouth 2 Times a Day., Disp: 60 Tab, Rfl: 2  •  nystatin (MYCOSTATIN) 633116 UNIT/ML Suspension, Take 5 mL by mouth 4 times a day., Disp: 200 mL, Rfl: 1  •  promethazine (PHENERGAN) 25 MG Suppos, Insert 1 Suppository in rectum every 6 hours as needed for Nausea/Vomiting., Disp: 10 Suppository, Rfl: 0  •  ziprasidone (GEODON) 40 MG Cap, Take 1 Cap by mouth 2 Times a Day., Disp: 60 Cap, Rfl: 0  •  traZODone (DESYREL) 100 MG Tab, Take 0.5 Tabs by mouth every evening., Disp: 30 Tab, Rfl: 3  •  lidocaine (LIDODERM) 5 % Patch, Apply 1 Patch to skin as directed every 24 hours., Disp: 10 Patch, Rfl:   •  senna-docusate (PERICOLACE OR SENOKOT S) 8.6-50 MG Tab, Take 2 Tabs by mouth 2 times a day as needed for Constipation., Disp: , Rfl:   •  Dextrose, Diabetic Use, (GLUCOSE) 4 g chewable tablet, Take 4 Tabs by mouth as needed for Low Blood Sugar (If FSBG is less than or equal to 70 mg/dL and patient able to eat or drink)., Disp: 30 Tab, Rfl:   •  gabapentin (NEURONTIN) 100 MG Cap, Take 1 Cap by mouth 4 times a day., Disp: , Rfl:   •  predniSONE (DELTASONE) 10 MG Tab, Take 10 mg by mouth every day., Disp: , Rfl:   •  budesonide-formoterol (SYMBICORT) 160-4.5 MCG/ACT Aerosol, Inhale 2 Puffs by mouth 2 Times a Day., Disp: , Rfl:   •  fluoxetine (PROZAC) 40 MG capsule, Take 40 mg by mouth every day., Disp: , Rfl:   •  aspirin EC (ECOTRIN) 81 MG Tablet Delayed Response, Take 81 mg by mouth every day., Disp: , Rfl:   •  levothyroxine (SYNTHROID) 75 MCG Tab, Take 75 mcg by mouth Every morning on an empty stomach., Disp: , Rfl:   •  montelukast (SINGULAIR) 10 MG Tab, Take 10 mg by mouth every day., Disp: , Rfl:   •  ondansetron (ZOFRAN ODT) 4 MG TABLET DISPERSIBLE, Take 4 mg by mouth every 6 hours as needed for Nausea.,  Disp: , Rfl:   •  potassium chloride SA (KDUR) 20 MEQ Tab CR, Take 20 mEq by mouth 2 times a day., Disp: , Rfl:   •  ranitidine (ZANTAC) 300 MG tablet, Take 300 mg by mouth every morning., Disp: , Rfl:   •  Diclofenac Sodium (VOLTAREN) 1 % Gel, Apply 1 Application to skin as directed 4 times a day as needed (on wrists, back, ankles, and feet)., Disp: , Rfl:   •  tiotropium (SPIRIVA) 18 MCG Cap, Inhale 1 Cap by mouth every day., Disp: 90 Cap, Rfl: 3  •  folic acid (FOLVITE) 800 MCG tablet, Take 800 mg by mouth every day., Disp: , Rfl:   •  Ivabradine HCl (CORLANOR) 7.5 MG Tab, Take 7.5 mg by mouth 2 Times a Day., Disp: , Rfl:   •  ipratropium-albuterol (DUONEB) 0.5-2.5 (3) MG/3ML nebulizer solution, 3 mL by Nebulization route every 6 hours as needed for Shortness of Breath., Disp: 60 Bullet, Rfl: 1  •  etonogestrel (NEXPLANON) 68 MG Implant implant, Inject 1 Each as instructed Once., Disp: , Rfl:   •  mycophenolate (CELLCEPT) 500 MG tablet, Take 1,000 mg by mouth 2 times a day., Disp: , Rfl:   •  Dulaglutide (TRULICITY) 1.5 MG/0.5ML Solution Pen-injector, Inject 1.5 mg as instructed every Friday., Disp: , Rfl:   •  albuterol 108 (90 Base) MCG/ACT Aero Soln inhalation aerosol, Inhale 2 Puffs by mouth every 6 hours as needed for Shortness of Breath., Disp: , Rfl:   •  Melatonin 5 MG Tab, Take 10 mg by mouth every evening., Disp: , Rfl:   •  furosemide (LASIX) 80 MG Tab, Take 80 mg by mouth every day., Disp: , Rfl:      ALLERGIES  Allergies   Allergen Reactions   • Cefdinir Shortness of Breath and Itching     Tolerated 1/18/17  Tolerates ceftriaxone  Tolerated augmentin 8/2019    • Depakote [Divalproex Sodium] Unspecified     Muscle spasms/muscle pain and weakness     • Doxycycline Anaphylaxis and Vomiting     RXN=unknown   • Amitriptyline Unspecified     Headaches     • Aripiprazole [Abilify] Unspecified     Headaches/muscle twitching     • Clindamycin Nausea     Even with food     • Ees [Erythromycin] Vomiting and  "Nausea   • Flagyl [Metronidazole Hcl] Unspecified     \"eye problems\"     • Flomax [Tamsulosin Hydrochloride] Swelling   • Levaquin Unspecified     Severe muscle cramps in legs  RXN=unknown   • Metformin Unspecified     Increased lactic acid      • Tape Rash     Tears skin off  coban with Tegaderm tape ok intermittently  RXN=ongoing   • Vancomycin Itching     Pt becomes flushed in face and chest.   RXN=7/10/16   • Wound Dressing Adhesive Hives     By pt report   • Ciprofloxacin    • Depakote  [Divalproex Sodium]    • Erythromycin Rash     .   • Hydromorphone Hcl    • Kdc:Metronidazole+Tartrazine    • Keflex Rash     Pt states she gets a rash with this medication  Tolerates ceftriaxone   • Levofloxacin Unspecified     Leg muscle cramps   • Penicillins      PHYSICAL EXAM  VITAL SIGNS: /79   Pulse 79   Temp 36.7 °C (98.1 °F) (Temporal)   Ht 1.651 m (5' 5\")   Wt 118.8 kg (262 lb)   SpO2 96%   BMI 43.60 kg/m²   Vitals reviewed by myself.  Physical Exam  Nursing note and vitals reviewed.  Constitutional: Well-developed and well-nourished. No acute distress.   HENT: Head is normocephalic and atraumatic.  Posterior oropharynx is pink without exudates, no evidence of thrush on my exam, patient is managing her secretions out difficulty, no stridor  Neck: No palpable anterior cervical lymphadenopathy  Eyes: extra-ocular movements intact  Cardiovascular: Regular rate and  regular rhythm. No murmur heard.  Pulmonary/Chest: Breath sounds normal. No wheezes or rales.   Abdominal: Soft and non-tender. No distention.    Musculoskeletal: Extremities exhibit normal range of motion without edema or tenderness.   Neurological: Awake and alert  Skin: Skin is warm and dry. No rash.      DIAGNOSTIC STUDIES /  LABS  Labs Reviewed   CBC WITH DIFFERENTIAL - Abnormal; Notable for the following components:       Result Value    MCHC 33.1 (*)     Platelet Count 147 (*)     All other components within normal limits   COMP METABOLIC " PANEL - Abnormal; Notable for the following components:    Co2 18 (*)     Glucose 125 (*)     AST(SGOT) 11 (*)     All other components within normal limits   GROUP A STREP BY PCR   HCG QUAL SERUM   ESTIMATED GFR     All labs reviewed by me.    RADIOLOGY  CT-SOFT TISSUE NECK WITH   Final Result         1.  Unremarkable CT of the neck, no focal abscess is identified.      CT-CHEST (THORAX) WITH   Final Result         1.  No acute pulmonary disease identified.              The radiologist's interpretation of all radiological studies have been reviewed by me.    REASSESSMENT    12:35 AM - Patient evaluated at bedside. Patient will be treated with Decadron 16 mg and Proventil 2.5 mg. Ordered CT-Soft Tissue Neck, CT-Chest (Thorax), CBC with Differential, CMP, Group A Strep by PCR, HCG Qual Serum, and Estimated GFR.    2:34 AM - Reviewed lab and radiology results.    2:40 AM - I reevaluated patient at bedside. I updated her on the findings and plan for discharge. I provided strict return precautions. She understands and agrees.    COURSE & MEDICAL DECISION MAKING  Nursing notes, VS, PMSFHx reviewed in chart.    Patient is a 29-year-old female who comes in for evaluation of shortness of breath and sore throat.  Differential diagnosis includes strep pharyngitis, viral pharyngitis, asthma exacerbation, viral upper respiratory infection, pneumonia.  Diagnostic work-up includes labs and strep swab.  Patient is also complaining of hemoptysis and feeling as if her airway is closing and her voice is worse, therefore I will obtain a CT of the chest and neck.    Patient's initial vitals are within normal limits, she is not hypoxic.  Given her history of asthma with her shortness of breath I empirically treat her for possible asthma exacerbation with albuterol and dexamethasone after which she feels greatly improved.  Strep swab returns and is negative.  Labs are unremarkable.  White count is within normal limits.  CT of the chest  and neck are unremarkable.  Patient has no episodes of hemoptysis in the emergency department.  She continues to be well-appearing on multiple reassessments.  Therefore she is reassured and advised on symptomatic management of likely viral illness.  Patient is then given strict return precautions and discharged in stable condition.    The patient will return for new or worsening symptoms and is stable at the time of discharge.    The patient is referred to a primary physician for blood pressure management, diabetic screening, and for all other preventative health concerns.    DISPOSITION:  Patient will be discharged home in stable condition.    FOLLOW UP:  Torres Brody M.D.  [address] [place] [address] [address] [place] [id number]  [phone]        FINAL IMPRESSION  1. Dyspnea, unspecified type    2. Wheezing          IRamona (Scribe), am scribing for, and in the presence of, Ignacia Coon M.D..    Electronically signed by: Ramona Ruiz (Carolibsadie), 2/14/2020    IIgnacia M.D. personally performed the services described in this documentation, as scribed by Ramona Ruiz in my presence, and it is both accurate and complete. C    The note accurately reflects work and decisions made by me.  Ignacia Coon M.D.  2/14/2020  4:19 AM

## 2020-02-14 NOTE — DISCHARGE PLANNING
Medical Social Work    KRYSTIAN received a call from the RN advising KRYSTIAN that the pt needs [facility] transportation home. The pt lives at [address] [place] [id number]. KRYSTIAN called [facility] and obtained a trip ticket from [place] with [facility]. KRYSTIAN faxed a PCS form to [facility] and set up transportation with [employer] for 0445. RN aware of transport time.     Plan: KRYSTIAN will reman available for pt support.

## 2020-02-14 NOTE — ED TRIAGE NOTES
"Pt arrived to room via EMS. EMS reports \"Pt has upper airway wheezing secondary to thrush. She took three duoneb treatments and took her rescue inhaler with no relief and was worried that she was going to die.Pt also complains of coughing up blood for 7 days.\" Pt can not speak in full sentences. EMS gave 1 albuterol treatment en route. Pt placed in gown. Pt attached to monitors. Primary assessment done.     "

## 2020-02-14 NOTE — ED NOTES
Attempted to contact Pt's grandmother to get Pt's wheelchair to [facility]. Discussed POC with Pt. Pt verbalizes understanding. Pt denies any needs at this time. Pt's call light is within reach and bed is in low locked position.

## 2020-02-18 ENCOUNTER — OFFICE VISIT (OUTPATIENT)
Dept: MEDICAL GROUP | Facility: MEDICAL CENTER | Age: 30
End: 2020-02-18
Payer: MEDICARE

## 2020-02-18 VITALS
WEIGHT: 261 LBS | TEMPERATURE: 97.3 F | HEART RATE: 102 BPM | BODY MASS INDEX: 43.49 KG/M2 | OXYGEN SATURATION: 95 % | SYSTOLIC BLOOD PRESSURE: 122 MMHG | HEIGHT: 65 IN | DIASTOLIC BLOOD PRESSURE: 70 MMHG

## 2020-02-18 DIAGNOSIS — J45.901 MODERATE ASTHMA WITH EXACERBATION, UNSPECIFIED WHETHER PERSISTENT: ICD-10-CM

## 2020-02-18 PROCEDURE — 99213 OFFICE O/P EST LOW 20 MIN: CPT | Performed by: INTERNAL MEDICINE

## 2020-02-18 RX ORDER — AZITHROMYCIN 250 MG/1
250 TABLET, FILM COATED ORAL DAILY
Qty: 6 TAB | Refills: 0 | Status: SHIPPED | OUTPATIENT
Start: 2020-02-18 | End: 2020-02-25

## 2020-02-18 NOTE — PROGRESS NOTES
CC: Follow-up emergency room asthma exacerbation.                                                                                                                                      HPI:   Kristin presents today with the following.    1. Moderate asthma with exacerbation, unspecified whether persistent  Presents reporting she was seen in the emergency room 4 days ago with difficulty breathing and cough.  She is spiking intermittent fevers and is now taken Tylenol does not have a fever for last 2 days.  Breathing is still somewhat poor.  She does have a mild sore throat and earaches.  Some pain with swallowing.  She was given steroid and IV form and did feel better breathing immediately however steroids are now at 7-1/2 mg that is her baseline.      Patient Active Problem List    Diagnosis Date Noted   • Advanced care planning/counseling discussion 12/20/2019     Priority: High   • Breast wound 11/06/2017     Priority: High   • Polypharmacy 06/27/2016     Priority: High   • Weakness of both lower extremities 06/22/2016     Priority: High   • Bilateral heel pain secondary to calcaneal bone spurs 03/28/2016     Priority: High   • TONYA (obstructive sleep apnea) 01/09/2018     Priority: Medium   • Morbidly obese ([facility]) 03/07/2016     Priority: Medium   • H/O prior ablation treatment 02/10/2016     Priority: Medium   • Immunocompromised ([facility]) 11/06/2019     Priority: Low   • History of atrial fibrillation and cardiomyopathy? 11/06/2019     Priority: Low   • Moderate intermittent asthma without complication 06/20/2019     Priority: Low   • Optic neuritis 05/27/2019     Priority: Low   • Acquired hypothyroidism 08/04/2017     Priority: Low   • Type 2 diabetes mellitus with hyperglycemia, without long-term current use of insulin ([facility]) 04/26/2017     Priority: Low   • Depression 10/28/2016     Priority: Low   • Schizophrenia ([facility]) 10/27/2016     Priority: Low   • GERD (gastroesophageal reflux disease) 03/07/2016      Priority: Low   • Peripheral neuropathy and chornic pain syndrome (CMS-HCC) 03/06/2016     Priority: Low   • Fatty liver disease, nonalcoholic 01/19/2015     Priority: Low   • Anxiety 12/16/2014     Priority: Low   • Knee pain, right 02/13/2014     Priority: Low   • Neurogenic bladder 04/02/2011     Priority: Low   • Borderline personality disorder in adult (HCC) 09/18/2010     Priority: Low   • Seth hematuria 12/27/2019   • Stress incontinence 12/27/2019   • Mixed stress and urge urinary incontinence 12/27/2019   • Overflow incontinence of urine 12/27/2019   • Urge incontinence of urine 12/27/2019   • Increased frequency of urination 12/27/2019   • History of optic neuritis 12/17/2019   • Mastoiditis 12/16/2019   • Mild intermittent asthma without complication 12/16/2019   • Closed head injury 11/30/2019   • Hypocalcemia 11/30/2019   • Debility 09/30/2019   • Microscopic hematuria 06/13/2019   • Palpitations 10/01/2018   • Functional diarrhea 01/05/2018   • Bowel and bladder incontinence 10/27/2016   • Galactorrhea 07/22/2016   • Scoliosis 03/07/2016       Current Outpatient Medications   Medication Sig Dispense Refill   • azithromycin (ZITHROMAX Z-ASHLEY) 250 MG Tab Take 1 Tab by mouth every day for 5 days. Take 2 tabs on day 1 6 Tab 0   • busPIRone (BUSPAR) 10 MG Tab tablet TAKE ONE TABLET BY MOUTH TWICE DAILY 60 Tab 0   • acetaZOLAMIDE SR (DIAMOX) 500 MG CAPSULE SR 12 HR Take 1 Cap by mouth every day. 30 Cap 1   • OXcarbazepine (TRILEPTAL) 150 MG Tab Take 1 Tab by mouth 2 Times a Day. 60 Tab 2   • nystatin (MYCOSTATIN) 307294 UNIT/ML Suspension Take 5 mL by mouth 4 times a day. 200 mL 1   • promethazine (PHENERGAN) 25 MG Suppos Insert 1 Suppository in rectum every 6 hours as needed for Nausea/Vomiting. 10 Suppository 0   • ziprasidone (GEODON) 40 MG Cap Take 1 Cap by mouth 2 Times a Day. 60 Cap 0   • traZODone (DESYREL) 100 MG Tab Take 0.5 Tabs by mouth every evening. 30 Tab 3   • lidocaine (LIDODERM) 5 % Patch  Apply 1 Patch to skin as directed every 24 hours. 10 Patch    • senna-docusate (PERICOLACE OR SENOKOT S) 8.6-50 MG Tab Take 2 Tabs by mouth 2 times a day as needed for Constipation.     • Dextrose, Diabetic Use, (GLUCOSE) 4 g chewable tablet Take 4 Tabs by mouth as needed for Low Blood Sugar (If FSBG is less than or equal to 70 mg/dL and patient able to eat or drink). 30 Tab    • gabapentin (NEURONTIN) 100 MG Cap Take 1 Cap by mouth 4 times a day.     • predniSONE (DELTASONE) 10 MG Tab Take 10 mg by mouth every day.     • budesonide-formoterol (SYMBICORT) 160-4.5 MCG/ACT Aerosol Inhale 2 Puffs by mouth 2 Times a Day.     • fluoxetine (PROZAC) 40 MG capsule Take 40 mg by mouth every day.     • aspirin EC (ECOTRIN) 81 MG Tablet Delayed Response Take 81 mg by mouth every day.     • levothyroxine (SYNTHROID) 75 MCG Tab Take 75 mcg by mouth Every morning on an empty stomach.     • montelukast (SINGULAIR) 10 MG Tab Take 10 mg by mouth every day.     • ondansetron (ZOFRAN ODT) 4 MG TABLET DISPERSIBLE Take 4 mg by mouth every 6 hours as needed for Nausea.     • potassium chloride SA (KDUR) 20 MEQ Tab CR Take 20 mEq by mouth 2 times a day.     • ranitidine (ZANTAC) 300 MG tablet Take 300 mg by mouth every morning.     • Diclofenac Sodium (VOLTAREN) 1 % Gel Apply 1 Application to skin as directed 4 times a day as needed (on wrists, back, ankles, and feet).     • tiotropium (SPIRIVA) 18 MCG Cap Inhale 1 Cap by mouth every day. 90 Cap 3   • folic acid (FOLVITE) 800 MCG tablet Take 800 mg by mouth every day.     • Ivabradine HCl (CORLANOR) 7.5 MG Tab Take 7.5 mg by mouth 2 Times a Day.     • ipratropium-albuterol (DUONEB) 0.5-2.5 (3) MG/3ML nebulizer solution 3 mL by Nebulization route every 6 hours as needed for Shortness of Breath. 60 Bullet 1   • etonogestrel (NEXPLANON) 68 MG Implant implant Inject 1 Each as instructed Once.     • mycophenolate (CELLCEPT) 500 MG tablet Take 1,000 mg by mouth 2 times a day.     • Dulaglutide  "(TRULICITY) 1.5 MG/0.5ML Solution Pen-injector Inject 1.5 mg as instructed every Friday.     • albuterol 108 (90 Base) MCG/ACT Aero Soln inhalation aerosol Inhale 2 Puffs by mouth every 6 hours as needed for Shortness of Breath.     • Melatonin 5 MG Tab Take 10 mg by mouth every evening.     • furosemide (LASIX) 80 MG Tab Take 80 mg by mouth every day.       No current facility-administered medications for this visit.          Allergies as of 02/18/2020 - Reviewed 02/18/2020   Allergen Reaction Noted   • Cefdinir Shortness of Breath and Itching 03/01/2016   • Depakote [divalproex sodium] Unspecified 06/14/2010   • Doxycycline Anaphylaxis and Vomiting 08/15/2012   • Amitriptyline Unspecified 10/31/2013   • Aripiprazole [abilify] Unspecified 01/17/2013   • Clindamycin Nausea 02/02/2011   • Flagyl [metronidazole hcl] Unspecified 03/31/2011   • Flomax [tamsulosin hydrochloride] Swelling 09/24/2009   • Levaquin Unspecified 10/31/2013   • Metformin Unspecified 07/23/2013   • Tape Rash 08/15/2012   • Vancomycin Itching 07/10/2016   • Wound dressing adhesive Hives 01/12/2018   • Ciprofloxacin  01/16/2020   • Depakote  [divalproex sodium]  01/16/2020   • Hydromorphone hcl  01/16/2020   • Kdc:metronidazole+tartrazine  01/16/2020   • Keflex Rash 01/01/2017   • Levofloxacin Unspecified 10/27/2016   • Penicillins  01/16/2020        ROS: Denies Chest pain, changes to bowel or bladder    /70 (BP Location: Left arm, Patient Position: Sitting)   Pulse (!) 102   Temp 36.3 °C (97.3 °F)   Ht 1.651 m (5' 5\")   Wt 118.4 kg (261 lb)   SpO2 95%   BMI 43.43 kg/m²     Physical Exam:  Gen:         Alert and oriented, No apparent distress.  Heent:       TMs clear bilaterally, oropharynx without erythema or exudates  Neck:        No Lymphadenopathy or Bruits.  Lungs:     Clear to auscultation bilaterally  CV:          Regular rate and rhythm. No murmurs, rubs or gallops.               Ext:          No clubbing, cyanosis, " edema.      Assessment and Plan.   29 y.o. female with the following issues.    1. Moderate asthma with exacerbation, unspecified whether persistent  Increasing her steroid up to 20 mg for the next 5 days down to 10 for 5 days and then try and get back to 7-1/2.  Have given a Z-Ilya which she reports she can take without problem.  Went over erythromycin she also reported that she is tolerated that in the past as well.

## 2020-02-21 RX ORDER — ZIPRASIDONE HYDROCHLORIDE 40 MG/1
CAPSULE ORAL
Qty: 60 CAP | Refills: 0 | Status: SHIPPED | OUTPATIENT
Start: 2020-02-21 | End: 2020-05-07

## 2020-02-23 ENCOUNTER — HOSPITAL ENCOUNTER (INPATIENT)
Facility: MEDICAL CENTER | Age: 30
LOS: 1 days | DRG: 103 | End: 2020-02-25
Attending: EMERGENCY MEDICINE | Admitting: INTERNAL MEDICINE
Payer: MEDICARE

## 2020-02-23 DIAGNOSIS — R51.9 FACIAL PAIN: ICD-10-CM

## 2020-02-23 DIAGNOSIS — R51.9 ACUTE INTRACTABLE HEADACHE, UNSPECIFIED HEADACHE TYPE: ICD-10-CM

## 2020-02-23 PROCEDURE — 96374 THER/PROPH/DIAG INJ IV PUSH: CPT

## 2020-02-23 PROCEDURE — 99285 EMERGENCY DEPT VISIT HI MDM: CPT

## 2020-02-23 PROCEDURE — 96375 TX/PRO/DX INJ NEW DRUG ADDON: CPT

## 2020-02-23 PROCEDURE — 96376 TX/PRO/DX INJ SAME DRUG ADON: CPT

## 2020-02-23 ASSESSMENT — LIFESTYLE VARIABLES
DOES PATIENT WANT TO STOP DRINKING: NO
HAVE PEOPLE ANNOYED YOU BY CRITICIZING YOUR DRINKING: NO
TOTAL SCORE: 0
EVER FELT BAD OR GUILTY ABOUT YOUR DRINKING: NO
EVER HAD A DRINK FIRST THING IN THE MORNING TO STEADY YOUR NERVES TO GET RID OF A HANGOVER: NO
TOTAL SCORE: 0
TOTAL SCORE: 0
DO YOU DRINK ALCOHOL: NO
HAVE YOU EVER FELT YOU SHOULD CUT DOWN ON YOUR DRINKING: NO
CONSUMPTION TOTAL: INCOMPLETE

## 2020-02-24 ENCOUNTER — APPOINTMENT (OUTPATIENT)
Dept: RADIOLOGY | Facility: MEDICAL CENTER | Age: 30
DRG: 103 | End: 2020-02-24
Attending: EMERGENCY MEDICINE
Payer: MEDICARE

## 2020-02-24 PROBLEM — J45.909 ASTHMA: Status: ACTIVE | Noted: 2019-06-20

## 2020-02-24 PROBLEM — H92.01 RIGHT EAR PAIN: Status: ACTIVE | Noted: 2020-02-24

## 2020-02-24 LAB
ALBUMIN SERPL BCP-MCNC: 4.2 G/DL (ref 3.2–4.9)
ALBUMIN/GLOB SERPL: 1.9 G/DL
ALP SERPL-CCNC: 61 U/L (ref 30–99)
ALT SERPL-CCNC: 16 U/L (ref 2–50)
ANION GAP SERPL CALC-SCNC: 8 MMOL/L (ref 0–11.9)
AST SERPL-CCNC: 10 U/L (ref 12–45)
BASOPHILS # BLD AUTO: 0.5 % (ref 0–1.8)
BASOPHILS # BLD: 0.05 K/UL (ref 0–0.12)
BILIRUB SERPL-MCNC: 0.4 MG/DL (ref 0.1–1.5)
BUN SERPL-MCNC: 16 MG/DL (ref 8–22)
BURR CELLS/RBC NFR CSF MANUAL: 0 %
CALCIUM SERPL-MCNC: 9.1 MG/DL (ref 8.5–10.5)
CHLORIDE SERPL-SCNC: 110 MMOL/L (ref 96–112)
CLARITY CSF: CLEAR
CO2 SERPL-SCNC: 18 MMOL/L (ref 20–33)
COLOR CSF: COLORLESS
COLOR SPUN CSF: COLORLESS
CREAT SERPL-MCNC: 0.93 MG/DL (ref 0.5–1.4)
CRP SERPL HS-MCNC: 0.37 MG/DL (ref 0–0.75)
EOSINOPHIL # BLD AUTO: 0.1 K/UL (ref 0–0.51)
EOSINOPHIL NFR BLD: 1.1 % (ref 0–6.9)
ERYTHROCYTE [DISTWIDTH] IN BLOOD BY AUTOMATED COUNT: 47.8 FL (ref 35.9–50)
ERYTHROCYTE [SEDIMENTATION RATE] IN BLOOD BY WESTERGREN METHOD: 11 MM/HOUR (ref 0–20)
GLOBULIN SER CALC-MCNC: 2.2 G/DL (ref 1.9–3.5)
GLUCOSE SERPL-MCNC: 88 MG/DL (ref 65–99)
HCT VFR BLD AUTO: 40.8 % (ref 37–47)
HGB BLD-MCNC: 13.5 G/DL (ref 12–16)
IMM GRANULOCYTES # BLD AUTO: 0.07 K/UL (ref 0–0.11)
IMM GRANULOCYTES NFR BLD AUTO: 0.8 % (ref 0–0.9)
LYMPHOCYTES # BLD AUTO: 2.58 K/UL (ref 1–4.8)
LYMPHOCYTES NFR BLD: 28.2 % (ref 22–41)
LYMPHOCYTES NFR CSF: 42 %
MAGNESIUM SERPL-MCNC: 2.1 MG/DL (ref 1.5–2.5)
MCH RBC QN AUTO: 30.1 PG (ref 27–33)
MCHC RBC AUTO-ENTMCNC: 33.1 G/DL (ref 33.6–35)
MCV RBC AUTO: 91.1 FL (ref 81.4–97.8)
MONOCYTES # BLD AUTO: 0.75 K/UL (ref 0–0.85)
MONOCYTES NFR BLD AUTO: 8.2 % (ref 0–13.4)
MONONUC CELLS NFR CSF: 49 %
NEUTROPHILS # BLD AUTO: 5.59 K/UL (ref 2–7.15)
NEUTROPHILS NFR BLD: 61.2 % (ref 44–72)
NEUTROPHILS NFR CSF: 9 %
NRBC # BLD AUTO: 0 K/UL
NRBC BLD-RTO: 0 /100 WBC
PLATELET # BLD AUTO: 210 K/UL (ref 164–446)
PMV BLD AUTO: 11.8 FL (ref 9–12.9)
POTASSIUM SERPL-SCNC: 3.7 MMOL/L (ref 3.6–5.5)
PROT SERPL-MCNC: 6.4 G/DL (ref 6–8.2)
RBC # BLD AUTO: 4.48 M/UL (ref 4.2–5.4)
RBC # CSF: 3 CELLS/UL
SODIUM SERPL-SCNC: 136 MMOL/L (ref 135–145)
SPECIMEN VOL CSF: 29 ML
TROPONIN T SERPL-MCNC: <6 NG/L (ref 6–19)
TUBE # CSF: 3
TUBE # CSF: 4
WBC # BLD AUTO: 9.1 K/UL (ref 4.8–10.8)
WBC # CSF: 2 CELLS/UL (ref 0–10)

## 2020-02-24 PROCEDURE — 85652 RBC SED RATE AUTOMATED: CPT

## 2020-02-24 PROCEDURE — 70496 CT ANGIOGRAPHY HEAD: CPT

## 2020-02-24 PROCEDURE — 96374 THER/PROPH/DIAG INJ IV PUSH: CPT | Mod: XU

## 2020-02-24 PROCEDURE — 86140 C-REACTIVE PROTEIN: CPT

## 2020-02-24 PROCEDURE — 99223 1ST HOSP IP/OBS HIGH 75: CPT | Mod: GC | Performed by: INTERNAL MEDICINE

## 2020-02-24 PROCEDURE — 97165 OT EVAL LOW COMPLEX 30 MIN: CPT

## 2020-02-24 PROCEDURE — A9270 NON-COVERED ITEM OR SERVICE: HCPCS | Performed by: INTERNAL MEDICINE

## 2020-02-24 PROCEDURE — 009U3ZX DRAINAGE OF SPINAL CANAL, PERCUTANEOUS APPROACH, DIAGNOSTIC: ICD-10-PCS | Performed by: INTERNAL MEDICINE

## 2020-02-24 PROCEDURE — 700111 HCHG RX REV CODE 636 W/ 250 OVERRIDE (IP): Performed by: STUDENT IN AN ORGANIZED HEALTH CARE EDUCATION/TRAINING PROGRAM

## 2020-02-24 PROCEDURE — 89051 BODY FLUID CELL COUNT: CPT

## 2020-02-24 PROCEDURE — 80053 COMPREHEN METABOLIC PANEL: CPT

## 2020-02-24 PROCEDURE — 84484 ASSAY OF TROPONIN QUANT: CPT

## 2020-02-24 PROCEDURE — 96375 TX/PRO/DX INJ NEW DRUG ADDON: CPT | Mod: XU

## 2020-02-24 PROCEDURE — 62270 DX LMBR SPI PNXR: CPT

## 2020-02-24 PROCEDURE — 97161 PT EVAL LOW COMPLEX 20 MIN: CPT

## 2020-02-24 PROCEDURE — A9270 NON-COVERED ITEM OR SERVICE: HCPCS | Performed by: STUDENT IN AN ORGANIZED HEALTH CARE EDUCATION/TRAINING PROGRAM

## 2020-02-24 PROCEDURE — 83735 ASSAY OF MAGNESIUM: CPT

## 2020-02-24 PROCEDURE — 85025 COMPLETE CBC W/AUTO DIFF WBC: CPT

## 2020-02-24 PROCEDURE — 93005 ELECTROCARDIOGRAM TRACING: CPT | Performed by: STUDENT IN AN ORGANIZED HEALTH CARE EDUCATION/TRAINING PROGRAM

## 2020-02-24 PROCEDURE — 700102 HCHG RX REV CODE 250 W/ 637 OVERRIDE(OP): Performed by: STUDENT IN AN ORGANIZED HEALTH CARE EDUCATION/TRAINING PROGRAM

## 2020-02-24 PROCEDURE — 70498 CT ANGIOGRAPHY NECK: CPT

## 2020-02-24 PROCEDURE — 700102 HCHG RX REV CODE 250 W/ 637 OVERRIDE(OP): Performed by: INTERNAL MEDICINE

## 2020-02-24 PROCEDURE — 700101 HCHG RX REV CODE 250: Performed by: STUDENT IN AN ORGANIZED HEALTH CARE EDUCATION/TRAINING PROGRAM

## 2020-02-24 PROCEDURE — 700117 HCHG RX CONTRAST REV CODE 255: Performed by: EMERGENCY MEDICINE

## 2020-02-24 PROCEDURE — 700111 HCHG RX REV CODE 636 W/ 250 OVERRIDE (IP): Performed by: EMERGENCY MEDICINE

## 2020-02-24 PROCEDURE — 700111 HCHG RX REV CODE 636 W/ 250 OVERRIDE (IP): Performed by: INTERNAL MEDICINE

## 2020-02-24 PROCEDURE — 36415 COLL VENOUS BLD VENIPUNCTURE: CPT

## 2020-02-24 PROCEDURE — 770020 HCHG ROOM/CARE - TELE (206)

## 2020-02-24 RX ORDER — MONTELUKAST SODIUM 10 MG/1
10 TABLET ORAL DAILY
Status: DISCONTINUED | OUTPATIENT
Start: 2020-02-24 | End: 2020-02-24

## 2020-02-24 RX ORDER — METHYLPREDNISOLONE SODIUM SUCCINATE 125 MG/2ML
250 INJECTION, POWDER, LYOPHILIZED, FOR SOLUTION INTRAMUSCULAR; INTRAVENOUS ONCE
Status: COMPLETED | OUTPATIENT
Start: 2020-02-24 | End: 2020-02-24

## 2020-02-24 RX ORDER — LEVOTHYROXINE SODIUM 0.07 MG/1
75 TABLET ORAL
Status: DISCONTINUED | OUTPATIENT
Start: 2020-02-24 | End: 2020-02-25 | Stop reason: HOSPADM

## 2020-02-24 RX ORDER — PREGABALIN 300 MG/1
300 CAPSULE ORAL 2 TIMES DAILY
Status: ON HOLD | COMMUNITY
End: 2020-10-12

## 2020-02-24 RX ORDER — BISACODYL 10 MG
10 SUPPOSITORY, RECTAL RECTAL
Status: DISCONTINUED | OUTPATIENT
Start: 2020-02-24 | End: 2020-02-25 | Stop reason: HOSPADM

## 2020-02-24 RX ORDER — LIDOCAINE 50 MG/G
1 PATCH TOPICAL EVERY 24 HOURS
Status: DISCONTINUED | OUTPATIENT
Start: 2020-02-24 | End: 2020-02-24

## 2020-02-24 RX ORDER — TRAZODONE HYDROCHLORIDE 100 MG/1
50 TABLET ORAL EVERY EVENING
Status: DISCONTINUED | OUTPATIENT
Start: 2020-02-24 | End: 2020-02-25 | Stop reason: HOSPADM

## 2020-02-24 RX ORDER — GABAPENTIN 100 MG/1
100 CAPSULE ORAL 4 TIMES DAILY
Status: DISCONTINUED | OUTPATIENT
Start: 2020-02-24 | End: 2020-02-24

## 2020-02-24 RX ORDER — PREGABALIN 75 MG/1
150 CAPSULE ORAL 2 TIMES DAILY
Status: DISCONTINUED | OUTPATIENT
Start: 2020-02-24 | End: 2020-02-25 | Stop reason: HOSPADM

## 2020-02-24 RX ORDER — DIPHENHYDRAMINE HYDROCHLORIDE 50 MG/ML
25 INJECTION INTRAMUSCULAR; INTRAVENOUS ONCE
Status: COMPLETED | OUTPATIENT
Start: 2020-02-24 | End: 2020-02-24

## 2020-02-24 RX ORDER — AMOXICILLIN 250 MG
2 CAPSULE ORAL 2 TIMES DAILY
Status: DISCONTINUED | OUTPATIENT
Start: 2020-02-24 | End: 2020-02-25 | Stop reason: HOSPADM

## 2020-02-24 RX ORDER — BUSPIRONE HYDROCHLORIDE 10 MG/1
10 TABLET ORAL 2 TIMES DAILY
Status: DISCONTINUED | OUTPATIENT
Start: 2020-02-24 | End: 2020-02-25 | Stop reason: HOSPADM

## 2020-02-24 RX ORDER — ACETAZOLAMIDE 500 MG/1
500 CAPSULE, EXTENDED RELEASE ORAL DAILY
Status: DISCONTINUED | OUTPATIENT
Start: 2020-02-24 | End: 2020-02-25

## 2020-02-24 RX ORDER — ACETAMINOPHEN 325 MG/1
650 TABLET ORAL EVERY 6 HOURS PRN
Status: DISCONTINUED | OUTPATIENT
Start: 2020-02-24 | End: 2020-02-25 | Stop reason: HOSPADM

## 2020-02-24 RX ORDER — ALBUTEROL SULFATE 90 UG/1
2 AEROSOL, METERED RESPIRATORY (INHALATION) EVERY 6 HOURS PRN
Status: DISCONTINUED | OUTPATIENT
Start: 2020-02-24 | End: 2020-02-25 | Stop reason: HOSPADM

## 2020-02-24 RX ORDER — RANITIDINE 300 MG/1
300 TABLET ORAL EVERY MORNING
Status: DISCONTINUED | OUTPATIENT
Start: 2020-02-24 | End: 2020-02-24

## 2020-02-24 RX ORDER — CHOLECALCIFEROL (VITAMIN D3) 125 MCG
10 CAPSULE ORAL EVERY EVENING
Status: DISCONTINUED | OUTPATIENT
Start: 2020-02-24 | End: 2020-02-24

## 2020-02-24 RX ORDER — POLYETHYLENE GLYCOL 3350 17 G/17G
1 POWDER, FOR SOLUTION ORAL
Status: DISCONTINUED | OUTPATIENT
Start: 2020-02-24 | End: 2020-02-25 | Stop reason: HOSPADM

## 2020-02-24 RX ORDER — OXYCODONE HYDROCHLORIDE 5 MG/1
5 TABLET ORAL EVERY 6 HOURS PRN
Status: DISCONTINUED | OUTPATIENT
Start: 2020-02-24 | End: 2020-02-25 | Stop reason: HOSPADM

## 2020-02-24 RX ORDER — MYCOPHENOLATE MOFETIL 500 MG/1
1000 TABLET ORAL 2 TIMES DAILY
Status: DISCONTINUED | OUTPATIENT
Start: 2020-02-24 | End: 2020-02-24

## 2020-02-24 RX ORDER — MYCOPHENOLATE MOFETIL 250 MG/1
1000 CAPSULE ORAL 2 TIMES DAILY
Status: DISCONTINUED | OUTPATIENT
Start: 2020-02-24 | End: 2020-02-25 | Stop reason: HOSPADM

## 2020-02-24 RX ORDER — KETOROLAC TROMETHAMINE 30 MG/ML
30 INJECTION, SOLUTION INTRAMUSCULAR; INTRAVENOUS ONCE
Status: COMPLETED | OUTPATIENT
Start: 2020-02-24 | End: 2020-02-24

## 2020-02-24 RX ORDER — OXCARBAZEPINE 150 MG/1
150 TABLET, FILM COATED ORAL 2 TIMES DAILY
Status: DISCONTINUED | OUTPATIENT
Start: 2020-02-24 | End: 2020-02-25 | Stop reason: HOSPADM

## 2020-02-24 RX ORDER — MORPHINE SULFATE 4 MG/ML
4 INJECTION, SOLUTION INTRAMUSCULAR; INTRAVENOUS ONCE
Status: COMPLETED | OUTPATIENT
Start: 2020-02-24 | End: 2020-02-24

## 2020-02-24 RX ORDER — ZIPRASIDONE HYDROCHLORIDE 40 MG/1
40 CAPSULE ORAL 2 TIMES DAILY
Status: DISCONTINUED | OUTPATIENT
Start: 2020-02-24 | End: 2020-02-25 | Stop reason: HOSPADM

## 2020-02-24 RX ORDER — PROCHLORPERAZINE EDISYLATE 5 MG/ML
10 INJECTION INTRAMUSCULAR; INTRAVENOUS ONCE
Status: COMPLETED | OUTPATIENT
Start: 2020-02-24 | End: 2020-02-24

## 2020-02-24 RX ORDER — IPRATROPIUM BROMIDE AND ALBUTEROL SULFATE 2.5; .5 MG/3ML; MG/3ML
3 SOLUTION RESPIRATORY (INHALATION)
Status: DISCONTINUED | OUTPATIENT
Start: 2020-02-24 | End: 2020-02-25 | Stop reason: HOSPADM

## 2020-02-24 RX ORDER — POTASSIUM CHLORIDE 20 MEQ/1
20 TABLET, EXTENDED RELEASE ORAL 2 TIMES DAILY
Status: DISCONTINUED | OUTPATIENT
Start: 2020-02-24 | End: 2020-02-24

## 2020-02-24 RX ORDER — FLUOXETINE HYDROCHLORIDE 20 MG/1
40 CAPSULE ORAL DAILY
Status: DISCONTINUED | OUTPATIENT
Start: 2020-02-24 | End: 2020-02-25 | Stop reason: HOSPADM

## 2020-02-24 RX ORDER — PROMETHAZINE HYDROCHLORIDE 25 MG/1
25 SUPPOSITORY RECTAL EVERY 6 HOURS PRN
Status: DISCONTINUED | OUTPATIENT
Start: 2020-02-24 | End: 2020-02-25 | Stop reason: HOSPADM

## 2020-02-24 RX ORDER — KETOROLAC TROMETHAMINE 30 MG/ML
30 INJECTION, SOLUTION INTRAMUSCULAR; INTRAVENOUS ONCE
Status: DISCONTINUED | OUTPATIENT
Start: 2020-02-24 | End: 2020-02-24

## 2020-02-24 RX ORDER — PREDNISONE 5 MG/1
7.5 TABLET ORAL DAILY
Status: DISCONTINUED | OUTPATIENT
Start: 2020-02-24 | End: 2020-02-25 | Stop reason: HOSPADM

## 2020-02-24 RX ORDER — POTASSIUM CHLORIDE 20 MEQ/1
20 TABLET, EXTENDED RELEASE ORAL 2 TIMES DAILY
Status: DISCONTINUED | OUTPATIENT
Start: 2020-02-24 | End: 2020-02-25 | Stop reason: HOSPADM

## 2020-02-24 RX ORDER — FUROSEMIDE 40 MG/1
80 TABLET ORAL DAILY
Status: DISCONTINUED | OUTPATIENT
Start: 2020-02-24 | End: 2020-02-25 | Stop reason: HOSPADM

## 2020-02-24 RX ORDER — BUDESONIDE AND FORMOTEROL FUMARATE DIHYDRATE 160; 4.5 UG/1; UG/1
2 AEROSOL RESPIRATORY (INHALATION) 2 TIMES DAILY
Status: DISCONTINUED | OUTPATIENT
Start: 2020-02-24 | End: 2020-02-25 | Stop reason: HOSPADM

## 2020-02-24 RX ORDER — ONDANSETRON 4 MG/1
4 TABLET, ORALLY DISINTEGRATING ORAL EVERY 6 HOURS PRN
Status: DISCONTINUED | OUTPATIENT
Start: 2020-02-24 | End: 2020-02-25 | Stop reason: HOSPADM

## 2020-02-24 RX ORDER — AZITHROMYCIN 250 MG/1
250-500 TABLET, FILM COATED ORAL SEE ADMIN INSTRUCTIONS
Status: ON HOLD | COMMUNITY
End: 2020-03-24

## 2020-02-24 RX ADMIN — NYSTATIN 500000 UNITS: 100000 SUSPENSION ORAL at 13:03

## 2020-02-24 RX ADMIN — BUDESONIDE AND FORMOTEROL FUMARATE DIHYDRATE 2 PUFF: 160; 4.5 AEROSOL RESPIRATORY (INHALATION) at 16:39

## 2020-02-24 RX ADMIN — ACETAMINOPHEN 650 MG: 325 TABLET, FILM COATED ORAL at 10:12

## 2020-02-24 RX ADMIN — PREGABALIN 150 MG: 75 CAPSULE ORAL at 17:26

## 2020-02-24 RX ADMIN — MYCOPHENOLATE MOFETIL 1000 MG: 250 CAPSULE ORAL at 16:39

## 2020-02-24 RX ADMIN — KETOROLAC TROMETHAMINE 30 MG: 30 INJECTION, SOLUTION INTRAMUSCULAR at 01:22

## 2020-02-24 RX ADMIN — NYSTATIN 500000 UNITS: 100000 SUSPENSION ORAL at 20:49

## 2020-02-24 RX ADMIN — PROCHLORPERAZINE EDISYLATE 10 MG: 5 INJECTION INTRAMUSCULAR; INTRAVENOUS at 01:21

## 2020-02-24 RX ADMIN — NYSTATIN 500000 UNITS: 100000 SUSPENSION ORAL at 08:36

## 2020-02-24 RX ADMIN — NYSTATIN 500000 UNITS: 100000 SUSPENSION ORAL at 16:38

## 2020-02-24 RX ADMIN — DIPHENHYDRAMINE HYDROCHLORIDE 25 MG: 50 INJECTION INTRAMUSCULAR; INTRAVENOUS at 01:22

## 2020-02-24 RX ADMIN — OXCARBAZEPINE 150 MG: 150 TABLET, FILM COATED ORAL at 16:39

## 2020-02-24 RX ADMIN — SENNOSIDES AND DOCUSATE SODIUM 2 TABLET: 8.6; 5 TABLET ORAL at 08:32

## 2020-02-24 RX ADMIN — FLUOXETINE HYDROCHLORIDE 40 MG: 20 CAPSULE ORAL at 08:31

## 2020-02-24 RX ADMIN — LEVOTHYROXINE SODIUM 75 MCG: 75 TABLET ORAL at 08:34

## 2020-02-24 RX ADMIN — ACETAZOLAMIDE 500 MG: 500 CAPSULE, EXTENDED RELEASE ORAL at 08:33

## 2020-02-24 RX ADMIN — MORPHINE SULFATE 4 MG: 4 INJECTION INTRAVENOUS at 03:35

## 2020-02-24 RX ADMIN — BUSPIRONE HYDROCHLORIDE 10 MG: 10 TABLET ORAL at 16:38

## 2020-02-24 RX ADMIN — OXCARBAZEPINE 150 MG: 150 TABLET, FILM COATED ORAL at 08:34

## 2020-02-24 RX ADMIN — ZIPRASIDONE HYDROCHLORIDE 40 MG: 40 CAPSULE ORAL at 16:38

## 2020-02-24 RX ADMIN — LIDOCAINE HYDROCHLORIDE 20 ML: 10 INJECTION, SOLUTION INFILTRATION; PERINEURAL at 12:33

## 2020-02-24 RX ADMIN — SENNOSIDES AND DOCUSATE SODIUM 2 TABLET: 8.6; 5 TABLET ORAL at 16:39

## 2020-02-24 RX ADMIN — POTASSIUM CHLORIDE 20 MEQ: 1500 TABLET, EXTENDED RELEASE ORAL at 08:33

## 2020-02-24 RX ADMIN — METHYLPREDNISOLONE SODIUM SUCCINATE 250 MG: 125 INJECTION, POWDER, FOR SOLUTION INTRAMUSCULAR; INTRAVENOUS at 03:34

## 2020-02-24 RX ADMIN — ZIPRASIDONE HYDROCHLORIDE 40 MG: 40 CAPSULE ORAL at 08:31

## 2020-02-24 RX ADMIN — OXYCODONE HYDROCHLORIDE 5 MG: 5 TABLET ORAL at 14:36

## 2020-02-24 RX ADMIN — MYCOPHENOLATE MOFETIL 1000 MG: 250 CAPSULE ORAL at 08:35

## 2020-02-24 RX ADMIN — OXYCODONE HYDROCHLORIDE 5 MG: 5 TABLET ORAL at 20:49

## 2020-02-24 RX ADMIN — PREDNISONE 7.5 MG: 5 TABLET ORAL at 14:36

## 2020-02-24 RX ADMIN — IOHEXOL 100 ML: 350 INJECTION, SOLUTION INTRAVENOUS at 01:55

## 2020-02-24 RX ADMIN — FUROSEMIDE 80 MG: 40 TABLET ORAL at 08:31

## 2020-02-24 RX ADMIN — POTASSIUM CHLORIDE 20 MEQ: 1500 TABLET, EXTENDED RELEASE ORAL at 16:38

## 2020-02-24 RX ADMIN — TRAZODONE HYDROCHLORIDE 50 MG: 100 TABLET ORAL at 16:36

## 2020-02-24 RX ADMIN — BUDESONIDE AND FORMOTEROL FUMARATE DIHYDRATE 2 PUFF: 160; 4.5 AEROSOL RESPIRATORY (INHALATION) at 08:34

## 2020-02-24 RX ADMIN — BUSPIRONE HYDROCHLORIDE 10 MG: 10 TABLET ORAL at 08:36

## 2020-02-24 RX ADMIN — ASPIRIN 81 MG: 81 TABLET, COATED ORAL at 08:47

## 2020-02-24 ASSESSMENT — ENCOUNTER SYMPTOMS
HEADACHES: 1
EYE PAIN: 0
SENSORY CHANGE: 1
NAUSEA: 1
ABDOMINAL PAIN: 0
SPEECH CHANGE: 0
DIARRHEA: 0
STRIDOR: 0
DIZZINESS: 0
DOUBLE VISION: 0
PALPITATIONS: 0
MYALGIAS: 0
LOSS OF CONSCIOUSNESS: 0
EYE DISCHARGE: 0
FLANK PAIN: 0
BLOOD IN STOOL: 0
VOMITING: 0
SINUS PAIN: 1
SORE THROAT: 0
EYE REDNESS: 0
BRUISES/BLEEDS EASILY: 0
FOCAL WEAKNESS: 0
PND: 0
SHORTNESS OF BREATH: 0
WHEEZING: 0
HEARTBURN: 0
SEIZURES: 0
CONSTIPATION: 1
TREMORS: 0
ORTHOPNEA: 0
BLURRED VISION: 1
HEMOPTYSIS: 0

## 2020-02-24 ASSESSMENT — COGNITIVE AND FUNCTIONAL STATUS - GENERAL
CLIMB 3 TO 5 STEPS WITH RAILING: TOTAL
MOBILITY SCORE: 15
MOVING FROM LYING ON BACK TO SITTING ON SIDE OF FLAT BED: UNABLE
PERSONAL GROOMING: A LITTLE
HELP NEEDED FOR BATHING: A LOT
WALKING IN HOSPITAL ROOM: TOTAL
DAILY ACTIVITIY SCORE: 17
DRESSING REGULAR UPPER BODY CLOTHING: A LITTLE
SUGGESTED CMS G CODE MODIFIER DAILY ACTIVITY: CK
TOILETING: A LITTLE
DRESSING REGULAR LOWER BODY CLOTHING: A LOT
SUGGESTED CMS G CODE MODIFIER MOBILITY: CK

## 2020-02-24 ASSESSMENT — LIFESTYLE VARIABLES
HAVE PEOPLE ANNOYED YOU BY CRITICIZING YOUR DRINKING: NO
CONSUMPTION TOTAL: INCOMPLETE
EVER_SMOKED: NEVER
HAVE YOU EVER FELT YOU SHOULD CUT DOWN ON YOUR DRINKING: NO
TOTAL SCORE: 0
TOTAL SCORE: 0
ALCOHOL_USE: NO
EVER FELT BAD OR GUILTY ABOUT YOUR DRINKING: NO
EVER HAD A DRINK FIRST THING IN THE MORNING TO STEADY YOUR NERVES TO GET RID OF A HANGOVER: NO
TOTAL SCORE: 0

## 2020-02-24 ASSESSMENT — ACTIVITIES OF DAILY LIVING (ADL): TOILETING: REQUIRES ASSIST

## 2020-02-24 NOTE — PROCEDURES
Lumbar Puncture    Indication:    Headache and facial pain  Resident:    Ortiz Camarillo  Attending:    Susan Krause    A time-out was completed verifying correct patient, procedure, site, positioning, and special equipment if applicable. The patient was placed in the RIGHT lateral decubitus position in a semi-fetal position with help from the nursing staff. The area was cleansed and draped in usual sterile fashion. 1% lidocaine was used anesthetize the surrounding skin area. A 20-gauge 3.5-inch spinal needle was placed in the L3-L4 interspace. Clear cerebral spinal fluid was obtained and the opening pressure was noted to be 28.5 cm or mm. Four tubes were filled each with 8 mL of CSF. These were sent for the usual tests, including 1 tube to be held for further analysis if needed. The Attending/Resident was present for the entire procedure    Estimated Blood Loss: < 1ml  The patient tolerated the procedure well and there were no complications.

## 2020-02-24 NOTE — CARE PLAN
Problem: Safety  Goal: Will remain free from injury  Outcome: PROGRESSING AS EXPECTED  Note: Treaded socks in place, bed in the lowest position, bed alarm on, call light and belongings within reach, pt call for assistance appropriately      Problem: Knowledge Deficit  Goal: Knowledge of disease process/condition, treatment plan, diagnostic tests, and medications will improve  Outcome: PROGRESSING AS EXPECTED  Note: Educated on POC, all questions answered, hourly rounding in progress     Problem: Pain Management  Goal: Pain level will decrease to patient's comfort goal  Outcome: PROGRESSING AS EXPECTED  Note: Medicated with oxy5 per MAR with adequate pain control, hourly rounding in progress

## 2020-02-24 NOTE — ED NOTES
Med rec completed per pt and med list (returned)  Allergies reviewed    Pt completed a Z-Ilya yesterday 2/23/2020

## 2020-02-24 NOTE — ED NOTES
Report received from night shift.  Pt resting, on monitor, denies needs at this time.  Will continue to monitor.

## 2020-02-24 NOTE — THERAPY
"Physical Therapy Evaluation completed.   Bed Mobility: Supervised    Transfers: Supervised    Gait: Unable to Participate  with No Equipment Needed       Plan of Care: Patient with no further skilled PT needs in the acute care setting at this time  Discharge Recommendations: Equipment: No Equipment Needed. Post-acute therapy Discharge to home with home health for additional skilled therapy services.    Pt admitted for HA and facial pain presenting to PT very near her functional baseline. She was able to set up and perform slideboard transfer bed<->wheelchair at supervision as well as perform partial stands to scoot laterally up in bed. Pt did attempt sit to stand with therapist at mod A, but was rather tremulous and duration brief. Pt reports she has been working on standing and walking with HHPT services. At this time, pt does not require further acute PT intervention and appears functionally capable of return home with grandmother and HHPT services.     See \"Rehab Therapy-Acute\" Patient Summary Report for complete documentation.     "

## 2020-02-24 NOTE — ASSESSMENT & PLAN NOTE
Patient has tubes in place. Complains of pain with no hearing loss, tinitus.   Erythema of External auditory canal on examination without any drainage.     Plan :  Pain management.

## 2020-02-24 NOTE — ED NOTES
Contacted Peter, Charge Nurse, regarding IV access, unsuccessful attempt, requested assistance from trauma nurse.

## 2020-02-24 NOTE — ASSESSMENT & PLAN NOTE
Continue home meds.   DUlaglutide injection every Friday .   Last HBa1C from 08/2019 was 6.0  Diabetic diet.

## 2020-02-24 NOTE — PROGRESS NOTES
Pt arrived to unit, transfer to bed via slide board. 2 RN skin check complete with SONYA Zaragoza: Bilateral buttocks red, blanching. Three red, open areas noted to abdomen. Bruising noted to left foot and left lower extremity. Think, flaky calluses to bilateral feet.

## 2020-02-24 NOTE — H&P
"History & Physical Note    Date of Admission: 2/24/2020  Admission Status: Emergency  [facility] Team: [facility]  Attending: Lyle Quan MD  Senior Resident: Dr. Abreu  Intern: Dr. Camarillo  Contact Number: [phone]    Chief Complaint: Anormal sensation on right sie of face, right ear ache, left sided facial burning, inability to open left eye.    History of Present Illness (HPI): Kristin is a 29 y.o. female who presented 2/23/2020 with   -. PMH of hypothyroidism,   -. DM2,  - h/o optic neuritis (was on mycophenolate at one point ),   -. chronic hip and back pain,   -. schizophrenia ,   -. h/o ablation for SVT on ivadarabine,   -  asthma  (multiple admissions in the past for exacerbation)    - obesity,   - TONYA ( nor on CPAP),   -. right foot drop,    -. ?Stimulator in place for bowel, bladder incontinence,   -. h/o concussion in Nov/December,   -. h/o hemiplegia migraines,   - h/o of headache resolved with lumbar puncture now on diamox,   -. Multiple psychiatric issues.     who presented on this admission with sudden onset (at 8;30 pm last night) right sided facial pain described as \"poking\" with severe pain on the right ear described as \"feeling like it is about to explode\" .States that following that she felt burning sensation on the left side of face with redness  and drooping of left eyelid. Patient states that she is able to open the eyelid but with effort. States that there is a layer of fuzziness on the left eye.     Patient states that she has had sinus problems for the past 4 weeks. Tried azithromycin with mild relief, however now has pain all over and generalized feeling of illness with mild nausea and diffuse headache.     In the ED the patients vitals were wnl.   Labs  wnl including ESR.   CTA showed no large vessel occlusion or aneurysm identified.  CT angiogram of the neck within normal limits    Review of Systems:   Review of Systems   Constitutional:        States that over the past 4 weeks " she has felt feverish at times , feeling cold at times as well   HENT: Positive for congestion, ear pain and sinus pain. Negative for ear discharge, hearing loss, nosebleeds, sore throat and tinnitus.         Right ear pain. Tubes in place   Eyes: Positive for blurred vision. Negative for double vision, pain, discharge and redness.        Dry eyes- chronic.   Crusting at left lateral eye as per patient.    Respiratory: Negative for hemoptysis, shortness of breath, wheezing and stridor.    Cardiovascular: Negative for chest pain, palpitations, orthopnea and PND.   Gastrointestinal: Positive for constipation and nausea. Negative for abdominal pain, blood in stool, diarrhea, heartburn, melena and vomiting.   Genitourinary: Negative for dysuria, flank pain, frequency, hematuria and urgency.   Musculoskeletal: Negative for myalgias.   Skin: Negative for itching and rash.   Neurological: Positive for sensory change and headaches. Negative for dizziness, tremors, speech change, focal weakness, seizures and loss of consciousness.   Endo/Heme/Allergies: Does not bruise/bleed easily.       Past Medical History:   Past Medical History was reviewed with patient.   has a past medical history of Abdominal pain, Anginal syndrome, Apnea, sleep, Arrhythmia, Arthritis, ASTHMA, Atrial fibrillation ([facility]), Back pain, Borderline personality disorder ([facility]), Breath shortness, Bronchitis, Cardiac arrhythmia, Chickenpox, Chronic UTI (9/18/2010), Cough, Daytime sleepiness, Depression, Diabetes ([facility]), Diarrhea, Disorder of thyroid, Fall, Fatigue, Frequent headaches, Gasping for breath, Gynecological disorder, Headache(784.0), Heart burn, History of falling, Hypertension, Indigestion, Migraine, 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[facility]), 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    Past Surgical History: Past Surgical History was reviewed with patient.   has a past surgical history that includes neuro dest facet l/s w/ig sngl (4/21/2015); recovery (1/27/2016); [place] by laparoscopy (8/29/2010); lumbar fusion anterior (8/21/2012); other cardiac surgery (1/2016); tonsillectomy; bowel stimulator insertion (7/13/2016); gastroscopy with balloon dilatation (N/A, 1/4/2017); muscle biopsy (Right, 1/26/2017); other abdominal surgery; and laminotomy.    Medications: Medications have been reviewed with patient.  Prior to Admission Medications   Prescriptions Last Dose Informant Patient Reported? Taking?   Dextrose, Diabetic Use, (GLUCOSE) 4 g chewable tablet   No No   Sig: Take 4 Tabs by mouth as needed for Low Blood Sugar (If FSBG is less than or equal to 70 mg/dL and patient able to eat or drink).   Diclofenac Sodium (VOLTAREN) 1 % Gel  Patient Yes No   Sig: Apply 1 Application to skin as directed 4 times a day as needed (on wrists, back, ankles, and feet).   Dulaglutide (TRULICITY) 1.5 MG/0.5ML Solution Pen-injector  Patient Yes No   Sig: Inject 1.5 mg as instructed every Friday.   Ivabradine HCl (CORLANOR) 7.5 MG Tab  Patient Yes No   Sig: Take 7.5 mg by mouth 2 Times a Day.   Melatonin 5 MG Tab  Patient Yes No   Sig: Take 10 mg by mouth every evening.   OXcarbazepine (TRILEPTAL) 150 MG Tab   No No   Sig: Take 1 Tab by mouth 2 Times a Day.   acetaZOLAMIDE SR (DIAMOX) 500 MG CAPSULE SR 12 HR   No No   Sig: Take 1 Cap by mouth every day.   albuterol 108 (90 Base) MCG/ACT Aero Soln inhalation aerosol  Patient Yes No   Sig: Inhale 2 Puffs by mouth every 6 hours as needed for Shortness of Breath.   aspirin EC (ECOTRIN) 81 MG Tablet Delayed Response  Patient Yes No   Sig: Take 81 mg by mouth every day.   azithromycin (ZITHROMAX Z-ASHLEY) 250 MG Tab    No No   Sig: Take 1 Tab by mouth every day for 5 days. Take 2 tabs on day 1   budesonide-formoterol (SYMBICORT) 160-4.5 MCG/ACT Aerosol  Patient Yes No   Sig: Inhale 2 Puffs by mouth 2 Times a Day.   busPIRone (BUSPAR) 10 MG Tab tablet   No No   Sig: TAKE ONE TABLET BY MOUTH TWICE DAILY   etonogestrel (NEXPLANON) 68 MG Implant implant  Patient Yes No   Sig: Inject 1 Each as instructed Once.   fluoxetine (PROZAC) 40 MG capsule  Patient Yes No   Sig: Take 40 mg by mouth every day.   folic acid (FOLVITE) 800 MCG tablet  Patient Yes No   Sig: Take 800 mg by mouth every day.   furosemide (LASIX) 80 MG Tab  Patient Yes No   Sig: Take 80 mg by mouth every day.   gabapentin (NEURONTIN) 100 MG Cap   No No   Sig: Take 1 Cap by mouth 4 times a day.   ipratropium-albuterol (DUONEB) 0.5-2.5 (3) MG/3ML nebulizer solution  Patient No No   Sig: 3 mL by Nebulization route every 6 hours as needed for Shortness of Breath.   levothyroxine (SYNTHROID) 75 MCG Tab  Patient Yes No   Sig: Take 75 mcg by mouth Every morning on an empty stomach.   lidocaine (LIDODERM) 5 % Patch   No No   Sig: Apply 1 Patch to skin as directed every 24 hours.   montelukast (SINGULAIR) 10 MG Tab  Patient Yes No   Sig: Take 10 mg by mouth every day.   mycophenolate (CELLCEPT) 500 MG tablet  Patient Yes No   Sig: Take 1,000 mg by mouth 2 times a day.   nystatin (MYCOSTATIN) 561725 UNIT/ML Suspension   No No   Sig: Take 5 mL by mouth 4 times a day.   ondansetron (ZOFRAN ODT) 4 MG TABLET DISPERSIBLE  Patient Yes No   Sig: Take 4 mg by mouth every 6 hours as needed for Nausea.   potassium chloride SA (KDUR) 20 MEQ Tab CR  Patient Yes No   Sig: Take 20 mEq by mouth 2 times a day.   predniSONE (DELTASONE) 10 MG Tab   Yes No   Sig: Take 10 mg by mouth every day.   promethazine (PHENERGAN) 25 MG Suppos   No No   Sig: Insert 1 Suppository in rectum every 6 hours as needed for Nausea/Vomiting.   ranitidine (ZANTAC) 300 MG tablet  Patient Yes No   Sig: Take 300 mg by mouth  "every morning.   senna-docusate (PERICOLACE OR SENOKOT S) 8.6-50 MG Tab   No No   Sig: Take 2 Tabs by mouth 2 times a day as needed for Constipation.   tiotropium (SPIRIVA) 18 MCG Cap  Patient No No   Sig: Inhale 1 Cap by mouth every day.   traZODone (DESYREL) 100 MG Tab   No No   Sig: Take 0.5 Tabs by mouth every evening.   ziprasidone (GEODON) 40 MG Cap   No No   Sig: TAKE ONE CAPSULE BY MOUTH TWICE DAILY      Facility-Administered Medications: None        Allergies: Allergies have been reviewed with patient.  Allergies   Allergen Reactions   • Cefdinir Shortness of Breath and Itching     Tolerated 1/18/17  Tolerates ceftriaxone  Tolerated augmentin 8/2019    • Depakote [Divalproex Sodium] Unspecified     Muscle spasms/muscle pain and weakness     • Doxycycline Anaphylaxis and Vomiting     RXN=unknown   • Amitriptyline Unspecified     Headaches     • Aripiprazole [Abilify] Unspecified     Headaches/muscle twitching     • Clindamycin Nausea     Even with food     • Flagyl [Metronidazole Hcl] Unspecified     \"eye problems\"     • Flomax [Tamsulosin Hydrochloride] Swelling   • Levaquin Unspecified     Severe muscle cramps in legs  RXN=unknown   • Metformin Unspecified     Increased lactic acid      • Tape Rash     Tears skin off  coban with Tegaderm tape ok intermittently  RXN=ongoing   • Vancomycin Itching     Pt becomes flushed in face and chest.   RXN=7/10/16   • Wound Dressing Adhesive Hives     By pt report   • Ciprofloxacin    • Depakote  [Divalproex Sodium]    • Hydromorphone Hcl    • Kdc:Metronidazole+Tartrazine    • Keflex Rash     Pt states she gets a rash with this medication  Tolerates ceftriaxone   • Levofloxacin Unspecified     Leg muscle cramps   • Penicillins        Family History:   family history includes Genitourinary () Problems in her sister; Heart Disease in her maternal grandmother and mother; Hypertension in her maternal grandmother, maternal uncle, and mother; No Known Problems in her " sister; Other in her mother and sister; Sleep Apnea in her mother.     Social History:   Tobacco: none   Alcohol: none   Recreational drugs (illegal and prescription):  none   Employment: -  Activity Level: wheelchair   Living situation:  Grandparents  Recent travel:  None  Primary Care Provider: jen Brody M.D.  Other (stressors, spirituality, exposures):  NA  Physical Exam:   Vitals:  Temp:  [36.9 °C (98.4 °F)] 36.9 °C (98.4 °F)  Pulse:  [69-76] 75  Resp:  [16-17] 17  BP: (115-145)/(51-87) 118/59  SpO2:  [95 %-99 %] 97 %    Physical Exam  Constitutional:       General: She is not in acute distress.     Appearance: Normal appearance.   HENT:      Head: Normocephalic and atraumatic.      Right Ear: External ear normal.      Left Ear: External ear normal.      Ears:      Comments: Erythema in right ear with wax and tube in place     Nose: Congestion present. No rhinorrhea.      Mouth/Throat:      Mouth: Mucous membranes are moist.      Pharynx: No oropharyngeal exudate or posterior oropharyngeal erythema.      Comments: Mallampatti grade 4  Eyes:      General: No scleral icterus.        Right eye: No discharge.         Left eye: No discharge.      Extraocular Movements: Extraocular movements intact.      Comments: Sluggish pupillary reflexes.   Ptosis of left lid.   Neck:      Musculoskeletal: Normal range of motion.   Cardiovascular:      Rate and Rhythm: Normal rate and regular rhythm.      Pulses: Normal pulses.      Heart sounds: Normal heart sounds. No murmur.   Pulmonary:      Effort: Pulmonary effort is normal. No respiratory distress.      Breath sounds: Normal breath sounds. No stridor. No wheezing, rhonchi or rales.   Chest:      Chest wall: No tenderness.   Abdominal:      General: There is no distension.      Palpations: Abdomen is soft.      Tenderness: There is no abdominal tenderness. There is no guarding.   Musculoskeletal: Normal range of motion.      Comments: Foot drop on right leg    Skin:     General: Skin is warm and dry.      Coloration: Skin is not jaundiced.   Neurological:      Mental Status: She is alert and oriented to person, place, and time.      Sensory: No sensory deficit.      Motor: No weakness.      Comments: Foot drop on right foot- not new.   Psychiatric:         Mood and Affect: Mood normal.         Labs:   Results for orders placed or performed during the hospital encounter of 02/23/20   CBC WITH DIFFERENTIAL   Result Value Ref Range    WBC 9.1 4.8 - 10.8 K/uL    RBC 4.48 4.20 - 5.40 M/uL    Hemoglobin 13.5 12.0 - 16.0 g/dL    Hematocrit 40.8 37.0 - 47.0 %    MCV 91.1 81.4 - 97.8 fL    MCH 30.1 27.0 - 33.0 pg    MCHC 33.1 (L) 33.6 - 35.0 g/dL    RDW 47.8 35.9 - 50.0 fL    Platelet Count 210 164 - 446 K/uL    MPV 11.8 9.0 - 12.9 fL    Neutrophils-Polys 61.20 44.00 - 72.00 %    Lymphocytes 28.20 22.00 - 41.00 %    Monocytes 8.20 0.00 - 13.40 %    Eosinophils 1.10 0.00 - 6.90 %    Basophils 0.50 0.00 - 1.80 %    Immature Granulocytes 0.80 0.00 - 0.90 %    Nucleated RBC 0.00 /100 WBC    Neutrophils (Absolute) 5.59 2.00 - 7.15 K/uL    Lymphs (Absolute) 2.58 1.00 - 4.80 K/uL    Monos (Absolute) 0.75 0.00 - 0.85 K/uL    Eos (Absolute) 0.10 0.00 - 0.51 K/uL    Baso (Absolute) 0.05 0.00 - 0.12 K/uL    Immature Granulocytes (abs) 0.07 0.00 - 0.11 K/uL    NRBC (Absolute) 0.00 K/uL   Comp Metabolic Panel   Result Value Ref Range    Sodium 136 135 - 145 mmol/L    Potassium 3.7 3.6 - 5.5 mmol/L    Chloride 110 96 - 112 mmol/L    Co2 18 (L) 20 - 33 mmol/L    Anion Gap 8.0 0.0 - 11.9    Glucose 88 65 - 99 mg/dL    Bun 16 8 - 22 mg/dL    Creatinine 0.93 0.50 - 1.40 mg/dL    Calcium 9.1 8.5 - 10.5 mg/dL    AST(SGOT) 10 (L) 12 - 45 U/L    ALT(SGPT) 16 2 - 50 U/L    Alkaline Phosphatase 61 30 - 99 U/L    Total Bilirubin 0.4 0.1 - 1.5 mg/dL    Albumin 4.2 3.2 - 4.9 g/dL    Total Protein 6.4 6.0 - 8.2 g/dL    Globulin 2.2 1.9 - 3.5 g/dL    A-G Ratio 1.9 g/dL   MAGNESIUM   Result Value Ref Range     Magnesium 2.1 1.5 - 2.5 mg/dL   ESTIMATED GFR   Result Value Ref Range    GFR If African American >60 >60 mL/min/1.73 m 2    GFR If Non African American >60 >60 mL/min/1.73 m 2   Sed Rate   Result Value Ref Range    Sed Rate Westergren 11 0 - 20 mm/hour   CRP QUANTITIVE (NON-CARDIAC)   Result Value Ref Range    Stat C-Reactive Protein 0.37 0.00 - 0.75 mg/dL     *Note: Due to a large number of results and/or encounters for the requested time period, some results have not been displayed. A complete set of results can be found in Results Review.       EKG: Per my read, None      Imaging:   CT-CTA HEAD WITH & W/O-POST PROCESS   Final Result         1.  No large vessel occlusion or aneurysm identified.      CT-CTA NECK WITH & W/O-POST PROCESSING   Final Result         1.  CT angiogram of the neck within normal limits.             Previous Data Review: reviewed    * Flare of Optic neuritis, left  Assessment & Plan  Patient with a history of optic neuritis with sudden onset blurry vision on left eye.  MRI not done because she has a bowel stimulator in place.       Plan :  Iv solumedrol once given in ED     - Patient takes prednisone 10 mg daily - held for the iV dose.     -Watch blood sugars while giving high dose steroids.   Neurochecks q 4  Neurology consult if needed.   Lumbar puncutre - diagnosis and for therpeutic purpose in view of headache and possible increased ICH (form past history of similar presentation.)  SCD for DVT prophylaxis for now.       Right ear pain  Assessment & Plan  Patient has tubes in place. Complains of pain with no hearing loss, tinitus.   Erythema of External auditory canal on examination without any drainage.     Plan :  May consider ENT consult.   Pain management.       Headache  Assessment & Plan  Has a history of headache reolution with lumbar puncture that showed elevated ICP.     Plan :  Therapeutic LP   Hence holding chemical DVT prophylaxis     TONYA (obstructive sleep apnea)-  (present on admission)  Assessment & Plan  Not on CPAP at home. Patient has not followed up with doctor yet for the same.   Follow up outpatient.         Asthma  Assessment & Plan  Continue home meds    Hypothyroidism  Assessment & Plan  Continue home levothyroxine 75mcg    DM (diabetes mellitus) (HCC)  Assessment & Plan  Continue home meds.   DUlaglutide injection every Friday .   Last HBa1C from 08/2019 was 6.0  Diabetic diet.

## 2020-02-24 NOTE — ASSESSMENT & PLAN NOTE
Has a history of headache resolution with lumbar puncture that showed elevated ICP.   Therapeutic LP done with raised ICP with opening pressure 28.5cm and drained about 32 ml of clear CSF. Patient 's headache decreased and eyes opened spontaneously.  Sent for cell count and gram stain.  Follow up with results.  Plan: pain management  For neurology consult.

## 2020-02-24 NOTE — PROGRESS NOTES
Daily Progress Note:     Date of Service: 2/24/2020  Primary Team: [facility] White Team   Attending: Kadeem Alvarenga M.D.   Senior Resident: Dr. Abreu  Intern: Dr. Camarillo  Contact:  [phone]    Chief Complaint:   Right sided facial and ear pain, left eye drooping and headache for few hours.    Subjective:  Patient this morning has headache, pain in the ear and tingling in the face and right side off the upper and lowe limb.  She is for lumbar puncture this afternoon.    Consultants/Specialty:  Neurology    Review of Systems:    Constitutional: Fever, chills  HEENT positive for right ear congestion and pain, sinus pain negative for ear discharge, hearing loss, nosebleed, sore throat, tinnitus  Eyes: Positive for blurred vision and drooping of left eyelid.  Negative double vision and discharge  Cardiovascular negative for chest pain, palpitation, orthopnea, PND  Respiratory: Negative for shortness of breath, cough  Gastrointestinal: Positive for constipation and nausea negative for abdominal pain, diarrhea, melena and vomiting  Genitourinary negative for dysuria, flank pain, frequency, hematuria  Musculoskeletal: Positive for back pain negative for myalgias  Skin: Negative for itching and rash  Neurological: Positive for headache and facial and ear pain negative for dizziness, tremors, seizures, loss of consciousness  Endo/heme/allergies: Does not bruise/bleed easily    Objective Data: Patient lying down in bed in no acute distress but with slight discomfort    Physical Exam:   Vitals:   Temp:  [36.2 °C (97.2 °F)-36.9 °C (98.4 °F)] 36.2 °C (97.2 °F)  Pulse:  [69-96] 79  Resp:  [16-18] 16  BP: (109-145)/(51-87) 109/57  SpO2:  [94 %-99 %] 94 %    Constitutional: No acute distress  HEENT normocephalic and atraumatic, erythema in the right ear with tube in place,  Mucous membranes pink and moist  Eyes left eye ptosis of left eyelid pupils reactive to light  Neck: Supple  Cardiovascular S1-S2 normal no  murmurs  Pulmonary air entry good bilaterally no wheezing no crackles  Abdominal soft nontender bowel sounds normal  Musculoskeletal normal range of motion foot drop on the right  Skin warm and dry  Neurological she is alert oriented to person place and time no sensory deficit  Psychiatric no SI/HI    Labs:   Recent Labs     02/24/20 0105   WBC 9.1   RBC 4.48   HEMOGLOBIN 13.5   HEMATOCRIT 40.8   MCV 91.1   MCH 30.1   RDW 47.8   PLATELETCT 210   MPV 11.8   NEUTSPOLYS 61.20   LYMPHOCYTES 28.20   MONOCYTES 8.20   EOSINOPHILS 1.10   BASOPHILS 0.50     Recent Labs     02/24/20  0105   SODIUM 136   POTASSIUM 3.7   CHLORIDE 110   CO2 18*   GLUCOSE 88   BUN 16     Recent Labs     02/24/20 0105   ALBUMIN 4.2   TBILIRUBIN 0.4   ALKPHOSPHAT 61   TOTPROTEIN 6.4   ALTSGPT 16   ASTSGOT 10*   CREATININE 0.93     Imaging:   CT-CTA HEAD WITH & W/O-POST PROCESS   Final Result         1.  No large vessel occlusion or aneurysm identified.      CT-CTA NECK WITH & W/O-POST PROCESSING   Final Result         1.  CT angiogram of the neck within normal limits.             * Headache  Assessment & Plan  Has a history of headache resolution with lumbar puncture that showed elevated ICP.   Therapeutic LP done with raised ICP with opening pressure 28.5cm and drained about 32 ml of clear CSF. Patient 's headache decreased and eyes opened spontaneously.  Sent for cell count and gram stain.  Follow up with results.  Plan: pain management  For neurology consult.    Right ear pain  Assessment & Plan  Patient has tubes in place. Complains of pain with no hearing loss, tinitus.   Erythema of External auditory canal on examination without any drainage.     Plan :  Pain management.       Flare of Optic neuritis, left  Assessment & Plan  Patient with a history of optic neuritis.  MRI not done because she has a bowel stimulator in place.       Plan :  Neurochecks q 4  Neurology consult  Continue cellcept and prednisone.      TONYA (obstructive sleep  apnea)- (present on admission)  Assessment & Plan  Not on CPAP at home. Patient has not followed up with doctor yet for the same.   Follow up outpatient.         Asthma  Assessment & Plan  Continue home meds    Hypothyroidism  Assessment & Plan  Continue home levothyroxine 75mcg    DM (diabetes mellitus) (HCC)  Assessment & Plan  Continue home meds.   DUlaglutide injection every Friday .   Last HBa1C from 08/2019 was 6.0  Diabetic diet.

## 2020-02-24 NOTE — ED TRIAGE NOTES
Chief Complaint   Patient presents with   • Headache     gradual onset at 2100 - pounding   • Facial Pain     c/o left side burning     Patient bib EMS, to triage via personal wheelchair, patient A&O x4.  Mask applied, verbalized understanding.      Explained wait time and triage process to pt. Pt placed back in lobby, told to notify ED tech or triage RN of any changes, verbalized understanding.

## 2020-02-24 NOTE — ASSESSMENT & PLAN NOTE
Patient with a history of optic neuritis.  MRI not done because she has a bowel stimulator in place.       Plan :  Neurochecks q 4  Neurology consult  Continue cellcept and prednisone.

## 2020-02-24 NOTE — PROGRESS NOTES
Report received from Fidel CUELLAR RN. Pt arrived to the floor via gurney with transport at 0910.  A/O x4. VSS. Responds appropriately. C/O pain, medicated per MAR, no  SOB. Assessment complete. Discussed POC, monitor VS, pain control, mobility, PT/OT, safety, DC planning , pt verbalizes understanding. Explained importance of calling before getting OOB. Call light and belongings within reach. Bed alarm on. Bed in the lowest position. Treaded socks in place. Hourly rounding in progress. Will continue to monitor .

## 2020-02-24 NOTE — PROGRESS NOTES
"Pt c/o fast heart beat, states,\" feels like my heart is racing and it's all over the place,\"  Notified MD. MD will place new orders in the MAR. Will continue to monitor.  "

## 2020-02-24 NOTE — ASSESSMENT & PLAN NOTE
Not on CPAP at home. Patient has not followed up with doctor yet for the same.   Follow up outpatient.

## 2020-02-24 NOTE — THERAPY
"Occupational Therapy Evaluation completed.   Functional Status:  Pt is a 28yo female admitted for HA and facial sensation changes. PMHx of hemiplegia migraines and borderline personality disorder, psychosis, and afib. Reports dulled sensation on RUE; dorsal aspects and upper/lower arms and tingling/numbness on volar side and tips of fingers. Reports continued dulled sensation on L side of face, and \"pins and needles\" on R side. SPV for eating, Max A for LB dressing (baseline), and SPV for SB txf to w/c including partial stands for lateral scooting; reports SB txfs to BSC at home. Sit>stand with mod A; but limited by weakness/balance. Reports has been working with HH. Appears to be near baseline. Reports grandmother assists with all IADLs and most ADLs at baseline, and is available 24/7. Patient will not be actively followed for occupational therapy services at this time, however may be seen if requested by physician for 1 more visit within 30 days to address any discharge or equipment needs.     Plan of Care: Will benefit from Occupational Therapy d/c needs only  Discharge Recommendations:  Equipment: No Equipment Needed. Post-acute therapy: Patient will not be actively followed for occupational therapy services at this time, however may be seen if requested by physician for 1 more visit within 30 days to address any discharge or equipment needs.       See \"Rehab Therapy-Acute\" Patient Summary Report for complete documentation.    "

## 2020-02-24 NOTE — SENIOR ADMIT NOTE
"[facility] Internal Medicine Senior Admit Note:    Kristin Balderrama is a 29 y.o. female with Hx of optic neuritis on Diamox, diabetes mellitus, asthma, TONYA, hypothyroidism and schizoaffective disorder, who presents with sudden onset of right-sided facial pain (like pins-and-needles) as well as right-sided ear pain and fullness.  Following which she developed flushing of her face on the left side and drooping of her left eyelid.  She also has associated sinusitis and nausea.  All these symptoms started around 8:30 PM yesterday.   At the time of presentation, she was hemodynamically stable.  CT of head and neck showed no large vessel occlusion.  MRI of the brain and orbits could not be performed as patient has a bowel stimulator (inserted and the year 2016), which needs to be investigated if it is MRI compatible -she has had MRIs up until 2015 but none after that.  CBC, ESR and CRP within normal limits.  No other NEW focal neurological deficits (except for an OLD right foot drop)  Neurologist on call, Dr. Hamilton was consulted by the ER physician-who recommended starting the patient on 125 mg Solu-Medrol and therapeutic lumbar puncture in the morning.  Patient received Compazine, Benadryl, ketorolac and morphine for pain control as well as a single dose of 125 mg of IV Solu-Medrol.      Physical Exam:   Vitals:    02/23/20 2254 02/23/20 2305   BP: 145/87    Pulse: 76    Resp: 16    Temp: 36.9 °C (98.4 °F)    TempSrc: Temporal    SpO2: 96%    Weight:  118.4 kg (261 lb)   Height: 1.651 m (5' 5\")      Body mass index is 43.43 kg/m².  /87   Pulse 76   Temp 36.9 °C (98.4 °F) (Temporal)   Resp 16   Ht 1.651 m (5' 5\")   Wt 118.4 kg (261 lb)   LMP 02/23/2019 Comment: irregular menses - implant  SpO2 96%   Breastfeeding No   BMI 43.43 kg/m²   O2 therapy: Pulse Oximetry: 96 %    General: Obese appearing, in NAD  HEENT: normocephalic, atraumatic, no scleral icterus.  Pupils sluggishly reacting to light.  Temporal " visual field defects in the left eye  Cardiovascular: RRR without m/r/g, no  lower extremity edema  Lungs: Clear to auscultation bilaterally, no crackles or wheezes  Abdomen: Soft, nontender to palpation, no guarding   Skin: No rashes or erythema appreciated  Neurologic: AAO x 3, no facial asymmetry.  5/5 strength in all 4 extremities, except for right foot -3/5 with foot drop       Assessment and Plan:    # Acute flareup of optic neuritis  # Raised intracranial pressure  # Diabetes mellitus  # Asthma  # TONYA  # Hypothyroidism  # Schizoaffective disorder      Admit to neurology  - IV Solu-Medrol 125 mg given in the ED  -Consider MRI if bowel stimulator is MRI compatible  -Consider therapeutic lumbar puncture in the a.m.  -Neurochecks every 4 hours  - Consider ENT referral for right ear pain (patient has bilateral tympanostomy tubes)  - SCDs (holding Lovenox for possible lumbar puncture in the a.m.)  - Continue all home meds for diabetes, hypothyroidism and psychiatric conditions        Jackson Stewart M.D.   Please refer to Intern Dr. Way's H&P for complete admission details.

## 2020-02-24 NOTE — ED PROVIDER NOTES
ED Provider Note    Scribed for Dino Morales M.D. by Rossana Joya. 2/24/2020, 12:10 AM.    Primary care provider: Torres Brody M.D.  Means of arrival: Walk-in  History obtained from: Patient  History limited by: None    CHIEF COMPLAINT  Chief Complaint   Patient presents with   • Headache     gradual onset at 2100 - pounding   • Facial Pain     c/o left side burning       HPI  Kristin Balderrama is a 29 y.o. female with a history of hemiplegic migraines and optic neuritis who presents to the Emergency Department complaining of worsening headache and facial pain onset a 4 hours prior to arrival. Patient states she awoke today feeling a pins and needles sensation on the right side of her face. The left side of her face started turning red and she noted a burning sensation. She had associated difficulty opening her left eye. She also developed a gradual, worsening headache that is worsened by light and sound with facial pressure. Her symptoms are not consistent with prior migraine headaches but she notes they are similar to a recent admission when she was seen by neurology and diagnosed with increased intracranial pressure. She underwent a therapeutic LP at that time which resolved her symptoms. She tried tylenol without improvement. She endorses associated vision changes (increased fussiness), intermittent slurred speech, and nausea but denies any neck pain. The patients notes that she slipped out of her wheelchair earlier today but landed on her bottom and did not hit her head.     REVIEW OF SYSTEMS  Pertinent positives include headache, facial pain, difficulty opening left eye, left eye vision change, intermittent slurred speech, and nausea. Pertinent negatives include no head traumas or neck pain. As above, all other systems reviewed and are negative.   See HPI for further details.     PAST MEDICAL HISTORY   has a past medical history of Abdominal pain, Anginal syndrome, Apnea, sleep, Arrhythmia, Arthritis,  ASTHMA, Atrial fibrillation (HCC), Back pain, Borderline personality disorder (HCC), Breath shortness, Bronchitis, Cardiac arrhythmia, Chickenpox, Chronic UTI (9/18/2010), Cough, Daytime sleepiness, Depression, Diabetes (HCC), Diarrhea, Disorder of thyroid, Fall, Fatigue, Frequent headaches, Gasping for breath, Gynecological disorder, Headache(784.0), Heart burn, History of falling, Hypertension, Indigestion, Migraine, 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Formerly [facility]), 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    SURGICAL HISTORY   has a past surgical history that includes neuro dest facet l/s w/ig sngl (4/21/2015); recovery (1/27/2016); [place] by laparoscopy (8/29/2010); lumbar fusion anterior (8/21/2012); other cardiac surgery (1/2016); tonsillectomy; bowel stimulator insertion (7/13/2016); gastroscopy with balloon dilatation (N/A, 1/4/2017); muscle biopsy (Right, 1/26/2017); other abdominal surgery; and laminotomy.    SOCIAL HISTORY  Social History     Tobacco Use   • Smoking status: Never Smoker   • Smokeless tobacco: Never Used   Substance Use Topics   • Alcohol use: No     Alcohol/week: 0.0 oz   • Drug use: Not Currently     Frequency: 7.0 times per week     Types: Marijuana, Oral     Comment: Medicinal edible's      Social History     Substance and Sexual Activity   Drug Use Not Currently   • Frequency: 7.0 times per week   • Types: Marijuana, Oral    Comment: Medicinal edible's       FAMILY HISTORY  Family History   Problem Relation Age of Onset   • Hypertension Mother    • Sleep Apnea Mother    • Heart Disease Mother    • Other Mother         hypothryod   • Hypertension Maternal Uncle    • Heart  Disease Maternal Grandmother    • Hypertension Maternal Grandmother    • No Known Problems Sister    • Other Sister         Narcolepsy;fibromyalsia;bone;nerve   • Genitourinary () Problems Sister         endometriosis       CURRENT MEDICATIONS  No current facility-administered medications on file prior to encounter.      Current Outpatient Medications on File Prior to Encounter   Medication Sig Dispense Refill   • ziprasidone (GEODON) 40 MG Cap TAKE ONE CAPSULE BY MOUTH TWICE DAILY 60 Cap 0   • busPIRone (BUSPAR) 10 MG Tab tablet TAKE ONE TABLET BY MOUTH TWICE DAILY 60 Tab 0   • acetaZOLAMIDE SR (DIAMOX) 500 MG CAPSULE SR 12 HR Take 1 Cap by mouth every day. 30 Cap 1   • OXcarbazepine (TRILEPTAL) 150 MG Tab Take 1 Tab by mouth 2 Times a Day. 60 Tab 2   • nystatin (MYCOSTATIN) 561977 UNIT/ML Suspension Take 5 mL by mouth 4 times a day. 200 mL 1   • promethazine (PHENERGAN) 25 MG Suppos Insert 1 Suppository in rectum every 6 hours as needed for Nausea/Vomiting. 10 Suppository 0   • traZODone (DESYREL) 100 MG Tab Take 0.5 Tabs by mouth every evening. 30 Tab 3   • lidocaine (LIDODERM) 5 % Patch Apply 1 Patch to skin as directed every 24 hours. 10 Patch    • senna-docusate (PERICOLACE OR SENOKOT S) 8.6-50 MG Tab Take 2 Tabs by mouth 2 times a day as needed for Constipation.     • Dextrose, Diabetic Use, (GLUCOSE) 4 g chewable tablet Take 4 Tabs by mouth as needed for Low Blood Sugar (If FSBG is less than or equal to 70 mg/dL and patient able to eat or drink). 30 Tab    • gabapentin (NEURONTIN) 100 MG Cap Take 1 Cap by mouth 4 times a day.     • predniSONE (DELTASONE) 10 MG Tab Take 10 mg by mouth every day.     • budesonide-formoterol (SYMBICORT) 160-4.5 MCG/ACT Aerosol Inhale 2 Puffs by mouth 2 Times a Day.     • fluoxetine (PROZAC) 40 MG capsule Take 40 mg by mouth every day.     • aspirin EC (ECOTRIN) 81 MG Tablet Delayed Response Take 81 mg by mouth every day.     • levothyroxine (SYNTHROID) 75 MCG Tab Take 75 mcg  by mouth Every morning on an empty stomach.     • montelukast (SINGULAIR) 10 MG Tab Take 10 mg by mouth every day.     • ondansetron (ZOFRAN ODT) 4 MG TABLET DISPERSIBLE Take 4 mg by mouth every 6 hours as needed for Nausea.     • potassium chloride SA (KDUR) 20 MEQ Tab CR Take 20 mEq by mouth 2 times a day.     • ranitidine (ZANTAC) 300 MG tablet Take 300 mg by mouth every morning.     • Diclofenac Sodium (VOLTAREN) 1 % Gel Apply 1 Application to skin as directed 4 times a day as needed (on wrists, back, ankles, and feet).     • tiotropium (SPIRIVA) 18 MCG Cap Inhale 1 Cap by mouth every day. 90 Cap 3   • folic acid (FOLVITE) 800 MCG tablet Take 800 mg by mouth every day.     • Ivabradine HCl (CORLANOR) 7.5 MG Tab Take 7.5 mg by mouth 2 Times a Day.     • ipratropium-albuterol (DUONEB) 0.5-2.5 (3) MG/3ML nebulizer solution 3 mL by Nebulization route every 6 hours as needed for Shortness of Breath. 60 Bullet 1   • etonogestrel (NEXPLANON) 68 MG Implant implant Inject 1 Each as instructed Once.     • mycophenolate (CELLCEPT) 500 MG tablet Take 1,000 mg by mouth 2 times a day.     • Dulaglutide (TRULICITY) 1.5 MG/0.5ML Solution Pen-injector Inject 1.5 mg as instructed every Friday.     • albuterol 108 (90 Base) MCG/ACT Aero Soln inhalation aerosol Inhale 2 Puffs by mouth every 6 hours as needed for Shortness of Breath.     • Melatonin 5 MG Tab Take 10 mg by mouth every evening.     • furosemide (LASIX) 80 MG Tab Take 80 mg by mouth every day.         ALLERGIES  Allergies   Allergen Reactions   • Cefdinir Shortness of Breath and Itching     Tolerated 1/18/17  Tolerates ceftriaxone  Tolerated augmentin 8/2019    • Depakote [Divalproex Sodium] Unspecified     Muscle spasms/muscle pain and weakness     • Doxycycline Anaphylaxis and Vomiting     RXN=unknown   • Amitriptyline Unspecified     Headaches     • Aripiprazole [Abilify] Unspecified     Headaches/muscle twitching     • Clindamycin Nausea     Even with food     •  "Flagyl [Metronidazole Hcl] Unspecified     \"eye problems\"     • Flomax [Tamsulosin Hydrochloride] Swelling   • Levaquin Unspecified     Severe muscle cramps in legs  RXN=unknown   • Metformin Unspecified     Increased lactic acid      • Tape Rash     Tears skin off  coban with Tegaderm tape ok intermittently  RXN=ongoing   • Vancomycin Itching     Pt becomes flushed in face and chest.   RXN=7/10/16   • Wound Dressing Adhesive Hives     By pt report   • Ciprofloxacin    • Depakote  [Divalproex Sodium]    • Hydromorphone Hcl    • Kdc:Metronidazole+Tartrazine    • Keflex Rash     Pt states she gets a rash with this medication  Tolerates ceftriaxone   • Levofloxacin Unspecified     Leg muscle cramps   • Penicillins        PHYSICAL EXAM  VITAL SIGNS: /87   Pulse 76   Temp 36.9 °C (98.4 °F) (Temporal)   Resp 16   Ht 1.651 m (5' 5\")   Wt 118.4 kg (261 lb)   LMP 02/23/2019 Comment: irregular menses - implant  SpO2 96%   Breastfeeding No   BMI 43.43 kg/m²   Vitals reviewed.  Constitutional: Alert in no apparent distress.  HENT: No signs of trauma, Bilateral external ears normal, Nose normal. Tubes noted in bilateral TMs. Hyperesthesia over left face, moist mucous membranes.  Eyes: Pupils are equal and reactive, Conjunctiva normal, Non-icteric. Left eye is closed but patient can open it with effort.  Neck: Normal range of motion, No tenderness, Supple, No stridor.   Lymphatic: No lymphadenopathy noted.   Cardiovascular: Regular rate and rhythm, no murmurs.   Thorax & Lungs: Normal breath sounds, No respiratory distress, No wheezing, No chest tenderness.   Abdomen: Bowel sounds normal, Soft, No tenderness, No peritoneal signs, No masses, No pulsatile masses.   Skin: Warm, Dry, No erythema, No rash.   Back: Normal alignment.   Extremities: Intact distal pulses, No edema, No tenderness, No cyanosis  Musculoskeletal: Good range of motion in all major joints. No major deformities noted.   Neurologic: Alert, Normal " speech. Normal motor function, Normal sensory function.  Strength and sensation equal bilaterally. No pronator drift. Normal finger to nose test. Gait deferred.  Psychiatric: Affect normal, Judgment normal, Mood normal.     DIAGNOSTIC STUDIES / PROCEDURES    LABS  Labs Reviewed   CBC WITH DIFFERENTIAL   COMP METABOLIC PANEL   MAGNESIUM      All labs reviewed by me.    RADIOLOGY  CT-CTA HEAD WITH & W/O-POST PROCESS    (Results Pending)   CT-CTA NECK WITH & W/O-POST PROCESSING    (Results Pending)     The radiologist's interpretation of all radiological studies have been reviewed by me.    COURSE & MEDICAL DECISION MAKING  Nursing notes, VS, PMSFHx reviewed in chart.  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  She has been unable to undergo MRI due to an InterStim device implanted in her back for bowel incontinence. She has been treated with Solu-Medrol in the past for similar symptoms with improvement.    12:10 AM Patient seen and examined at bedside. Patient arrives afebrile with normal vital signs. Patient appears well hydrated and non-toxic. The physical exam is remarkable for difficulty opening the left eye although she can do it with effort. She has hyperesthesia over the left side of her face. No obvious otitis media. EOMi. No ataxia. Normal strength and sensation. Symptoms not consistent with SAH or CNS infection. Unlikely CVA. Ordered for CT-CTA Head, CT-CTA neck, magnesium, CBC with differential, and CMP to evaluate. Patient will be treated with Benadryl IV 25 mg, Compazine IV 10 mg, and Toradol IV 30 mg for her symptoms.      Labs, including ESR and CRP are unremarkable. Because of patient's history of optic neuritis I discussed her with the on-call neurologist Dr. Hamilton who has  concurred with initiation of steroid therapy.    The patient was discussed with [facility] IM resident. She may ultimately require a therapeutic LP.    Patient hospitalized by [facility] Internal medicine in guarded condition.    FINAL IMPRESSION  1. Facial pain    2. Acute intractable headache, unspecified headache type          I, Rossana Joya (Scribe), am scribing for, and in the presence of, Dino Morales M.D..    Electronically signed by: Rossana Joya (Scribe), 2/24/2020    I, Dino Morales M.D. personally performed the services described in this documentation, as scribed by Rossana Joya in my presence, and it is both accurate and complete.    C    The note accurately reflects work and decisions made by me.  Dino Morales M.D.  2/24/2020  2:24 AM

## 2020-02-25 VITALS
HEART RATE: 76 BPM | BODY MASS INDEX: 43.49 KG/M2 | RESPIRATION RATE: 19 BRPM | WEIGHT: 261 LBS | OXYGEN SATURATION: 93 % | TEMPERATURE: 97.1 F | SYSTOLIC BLOOD PRESSURE: 123 MMHG | DIASTOLIC BLOOD PRESSURE: 77 MMHG | HEIGHT: 65 IN

## 2020-02-25 PROBLEM — H92.01 RIGHT EAR PAIN: Status: RESOLVED | Noted: 2020-02-24 | Resolved: 2020-02-25

## 2020-02-25 PROBLEM — H46.9 OPTIC NEURITIS, LEFT: Status: RESOLVED | Noted: 2019-05-27 | Resolved: 2020-02-25

## 2020-02-25 LAB — EKG IMPRESSION: NORMAL

## 2020-02-25 PROCEDURE — 700102 HCHG RX REV CODE 250 W/ 637 OVERRIDE(OP): Performed by: STUDENT IN AN ORGANIZED HEALTH CARE EDUCATION/TRAINING PROGRAM

## 2020-02-25 PROCEDURE — 700111 HCHG RX REV CODE 636 W/ 250 OVERRIDE (IP): Performed by: INTERNAL MEDICINE

## 2020-02-25 PROCEDURE — 700111 HCHG RX REV CODE 636 W/ 250 OVERRIDE (IP): Performed by: STUDENT IN AN ORGANIZED HEALTH CARE EDUCATION/TRAINING PROGRAM

## 2020-02-25 PROCEDURE — 99239 HOSP IP/OBS DSCHRG MGMT >30: CPT | Mod: GC | Performed by: INTERNAL MEDICINE

## 2020-02-25 PROCEDURE — A9270 NON-COVERED ITEM OR SERVICE: HCPCS | Performed by: INTERNAL MEDICINE

## 2020-02-25 PROCEDURE — A9270 NON-COVERED ITEM OR SERVICE: HCPCS | Performed by: STUDENT IN AN ORGANIZED HEALTH CARE EDUCATION/TRAINING PROGRAM

## 2020-02-25 PROCEDURE — 93010 ELECTROCARDIOGRAM REPORT: CPT | Performed by: INTERNAL MEDICINE

## 2020-02-25 PROCEDURE — 700102 HCHG RX REV CODE 250 W/ 637 OVERRIDE(OP): Performed by: INTERNAL MEDICINE

## 2020-02-25 RX ORDER — ACETAZOLAMIDE 500 MG/1
500 CAPSULE, EXTENDED RELEASE ORAL 2 TIMES DAILY
Qty: 60 CAP | Refills: 1 | Status: ON HOLD
Start: 2020-02-25 | End: 2020-03-24

## 2020-02-25 RX ORDER — FLUOXETINE HYDROCHLORIDE 40 MG/1
CAPSULE ORAL
Qty: 30 CAP | Refills: 0 | Status: SHIPPED | OUTPATIENT
Start: 2020-02-25 | End: 2020-05-07

## 2020-02-25 RX ORDER — ACETAZOLAMIDE 500 MG/1
500 CAPSULE, EXTENDED RELEASE ORAL 2 TIMES DAILY
Status: DISCONTINUED | OUTPATIENT
Start: 2020-02-25 | End: 2020-02-25 | Stop reason: HOSPADM

## 2020-02-25 RX ADMIN — NYSTATIN 500000 UNITS: 100000 SUSPENSION ORAL at 12:17

## 2020-02-25 RX ADMIN — OXYCODONE HYDROCHLORIDE 5 MG: 5 TABLET ORAL at 12:17

## 2020-02-25 RX ADMIN — BUDESONIDE AND FORMOTEROL FUMARATE DIHYDRATE 2 PUFF: 160; 4.5 AEROSOL RESPIRATORY (INHALATION) at 06:32

## 2020-02-25 RX ADMIN — ACETAZOLAMIDE 500 MG: 500 CAPSULE, EXTENDED RELEASE ORAL at 06:32

## 2020-02-25 RX ADMIN — PREDNISONE 7.5 MG: 5 TABLET ORAL at 06:32

## 2020-02-25 RX ADMIN — MYCOPHENOLATE MOFETIL 1000 MG: 250 CAPSULE ORAL at 07:32

## 2020-02-25 RX ADMIN — FUROSEMIDE 80 MG: 40 TABLET ORAL at 06:31

## 2020-02-25 RX ADMIN — NYSTATIN 500000 UNITS: 100000 SUSPENSION ORAL at 08:40

## 2020-02-25 RX ADMIN — PREGABALIN 150 MG: 75 CAPSULE ORAL at 06:31

## 2020-02-25 RX ADMIN — FLUOXETINE HYDROCHLORIDE 40 MG: 20 CAPSULE ORAL at 06:32

## 2020-02-25 RX ADMIN — GLYCOPYRROLATE 1 CAPSULE: 15.6 CAPSULE RESPIRATORY (INHALATION) at 09:17

## 2020-02-25 RX ADMIN — OXYCODONE HYDROCHLORIDE 5 MG: 5 TABLET ORAL at 06:31

## 2020-02-25 RX ADMIN — ZIPRASIDONE HYDROCHLORIDE 40 MG: 40 CAPSULE ORAL at 06:31

## 2020-02-25 RX ADMIN — LEVOTHYROXINE SODIUM 75 MCG: 75 TABLET ORAL at 06:31

## 2020-02-25 RX ADMIN — BUSPIRONE HYDROCHLORIDE 10 MG: 10 TABLET ORAL at 06:32

## 2020-02-25 RX ADMIN — ASPIRIN 81 MG: 81 TABLET, COATED ORAL at 06:32

## 2020-02-25 RX ADMIN — OXCARBAZEPINE 150 MG: 150 TABLET, FILM COATED ORAL at 06:31

## 2020-02-25 RX ADMIN — POTASSIUM CHLORIDE 20 MEQ: 1500 TABLET, EXTENDED RELEASE ORAL at 06:32
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
## 2020-02-25 NOTE — CARE PLAN
Problem: Communication  Goal: The ability to communicate needs accurately and effectively will improve  Outcome: PROGRESSING AS EXPECTED  Note: Patient A&Ox4, able to make needs known and using call light appropriately for assistance.      Problem: Knowledge Deficit  Goal: Knowledge of disease process/condition, treatment plan, diagnostic tests, and medications will improve  Outcome: PROGRESSING AS EXPECTED  Note: Patient updated on plan of care and indication for troponin lab, EKG, and tele monitoring; patient expressing understanding.

## 2020-02-25 NOTE — FACE TO FACE
Face to Face Supporting Documentation - Home Health    The encounter with this patient was in whole or in part the primary reason for home health admission.    Date of encounter:   Patient:                    MRN:                       YOB: 2021  Kristin Balderrama  [id number]  1990     Home health to see patient for:  Physical Therapy evaluation and treatment    Skilled need for:  Exacerbation of Chronic Disease State Weakness    Skilled nursing interventions to include:  Comment: Home physical therapy    Homebound status evidenced by:  Need the aid of supportive devices such as crutches, canes, wheelchairs or walkers. Leaving home requires a considerable and taxing effort. There is a normal inability to leave the home.    Community Physician to provide follow up care: Torres Brody M.D.     Optional Interventions? No      I certify the face to face encounter for this home health care referral meets the CMS requirements and the encounter/clinical assessment with the patient was, in whole, or in part, for the medical condition(s) listed above, which is the primary reason for home health care. Based on my clinical findings: the service(s) are medically necessary, support the need for home health care, and the homebound criteria are met.  I certify that this patient has had a face to face encounter by myself.  Indra Abreu M.D. - NPI: [id number]

## 2020-02-25 NOTE — PROGRESS NOTES
Patient discharged via wheelchair home with family. Discharge education provided to patient and family, pt verbalized understanding of education given. PIV removed, dressing applied. All personal belongings accounted for at time of discharge.

## 2020-02-25 NOTE — DIETARY
NUTRITION SERVICES: BMI - Pt with BMI >40 (=Body mass index is 43.43 kg/m².), morbid obesity. Weight loss counseling not appropriate in acute care setting. RECOMMEND - Referral to outpatient nutrition services for weight management after D/C.

## 2020-02-25 NOTE — CARE PLAN
Problem: Safety  Goal: Will remain free from injury  Note: Bed locked and in lowest position. Call light and personal belongings in reach. Bed alarm in place. Hourly rounding.       Problem: Venous Thromboembolism (VTW)/Deep Vein Thrombosis (DVT) Prevention:  Goal: Patient will participate in Venous Thrombosis (VTE)/Deep Vein Thrombosis (DVT)Prevention Measures  Note: Pt refusing SCDs. Encouraging mobilization as tolerated.

## 2020-02-25 NOTE — DISCHARGE INSTRUCTIONS
Discharge Instructions    Discharged to home by car with relative. Discharged via wheelchair, hospital escort: Refused.  Special equipment needed: Not Applicable    Be sure to schedule a follow-up appointment with your primary care doctor or any specialists as instructed.     Discharge Plan:   Diet Plan: Discussed  Activity Level: Discussed  Confirmed Follow up Appointment: Patient to Call and Schedule Appointment  Confirmed Symptoms Management: Discussed  Medication Reconciliation Updated: Yes  Influenza Vaccine Indication: Not indicated: Previously immunized this influenza season and > 8 years of age    I understand that a diet low in cholesterol, fat, and sodium is recommended for good health. Unless I have been given specific instructions below for another diet, I accept this instruction as my diet prescription.   Other diet: Diabetic    Special Instructions: None    · Is patient discharged on Warfarin / Coumadin?   No     Depression / Suicide Risk    As you are discharged from this [facility] facility, it is important to learn how to keep safe from harming yourself.    Recognize the warning signs:  · Abrupt changes in personality, positive or negative- including increase in energy   · Giving away possessions  · Change in eating patterns- significant weight changes-  positive or negative  · Change in sleeping patterns- unable to sleep or sleeping all the time   · Unwillingness or inability to communicate  · Depression  · Unusual sadness, discouragement and loneliness  · Talk of wanting to die  · Neglect of personal appearance   · Rebelliousness- reckless behavior  · Withdrawal from people/activities they love  · Confusion- inability to concentrate     If you or a loved one observes any of these behaviors or has concerns about self-harm, here's what you can do:  · Talk about it- your feelings and reasons for harming yourself  · Remove any means that you might use to hurt yourself (examples: pills, rope,  extension cords, firearm)  · Get professional help from the community (Mental Health, Substance Abuse, psychological counseling)  · Do not be alone:Call your Safe Contact- someone whom you trust who will be there for you.  · Call your local CRISIS HOTLINE 098-1780 or 540-872-5694  · Call your local Children's Mobile Crisis Response Team [place] (339) 662-6286 or www.[employer]  · Call the toll free National Suicide Prevention Hotlines   · National Suicide Prevention Lifeline 464-333-CXPH (4386)  · National Hope Line Network 800-SUICIDE (721-2496)

## 2020-02-25 NOTE — PROGRESS NOTES
Patient reporting feeling that she is having intermittent palpitations, pressure-like, somewhat sharp chest pain; VSS. Dr. Dupree from [facility] white team paged and notified. Orders placed by Dr. Dupree. Will continue to monitor.

## 2020-02-26 ENCOUNTER — PATIENT OUTREACH (OUTPATIENT)
Dept: MEDICAL GROUP | Facility: MEDICAL CENTER | Age: 30
End: 2020-02-26

## 2020-02-26 PROCEDURE — 99285 EMERGENCY DEPT VISIT HI MDM: CPT

## 2020-02-27 ENCOUNTER — APPOINTMENT (OUTPATIENT)
Dept: RADIOLOGY | Facility: MEDICAL CENTER | Age: 30
DRG: 880 | End: 2020-02-27
Attending: EMERGENCY MEDICINE
Payer: MEDICARE

## 2020-02-27 ENCOUNTER — APPOINTMENT (OUTPATIENT)
Dept: RADIOLOGY | Facility: MEDICAL CENTER | Age: 30
DRG: 880 | End: 2020-02-27
Attending: STUDENT IN AN ORGANIZED HEALTH CARE EDUCATION/TRAINING PROGRAM
Payer: MEDICARE

## 2020-02-27 ENCOUNTER — HOSPITAL ENCOUNTER (INPATIENT)
Facility: MEDICAL CENTER | Age: 30
LOS: 3 days | DRG: 880 | End: 2020-03-24
Attending: EMERGENCY MEDICINE | Admitting: INTERNAL MEDICINE
Payer: MEDICARE

## 2020-02-27 ENCOUNTER — APPOINTMENT (OUTPATIENT)
Dept: RADIOLOGY | Facility: MEDICAL CENTER | Age: 30
DRG: 880 | End: 2020-02-27
Attending: INTERNAL MEDICINE
Payer: MEDICARE

## 2020-02-27 DIAGNOSIS — E66.01 MORBIDLY OBESE (HCC): ICD-10-CM

## 2020-02-27 DIAGNOSIS — G47.33 OSA (OBSTRUCTIVE SLEEP APNEA): ICD-10-CM

## 2020-02-27 DIAGNOSIS — R29.898 LEG WEAKNESS, BILATERAL: ICD-10-CM

## 2020-02-27 DIAGNOSIS — R20.0 NUMBNESS: ICD-10-CM

## 2020-02-27 DIAGNOSIS — G93.2 INTRACRANIAL PRESSURE INCREASED: ICD-10-CM

## 2020-02-27 DIAGNOSIS — R20.0 LEG NUMBNESS: ICD-10-CM
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
## 2020-02-27 DIAGNOSIS — M54.50 ACUTE MIDLINE LOW BACK PAIN, UNSPECIFIED WHETHER SCIATICA PRESENT: ICD-10-CM

## 2020-02-27 DIAGNOSIS — E03.9 HYPOTHYROIDISM, UNSPECIFIED TYPE: Chronic | ICD-10-CM

## 2020-02-27 PROBLEM — R42 DIZZINESS: Status: ACTIVE | Noted: 2020-02-27

## 2020-02-27 LAB
ALBUMIN SERPL BCP-MCNC: 4 G/DL (ref 3.2–4.9)
ALBUMIN/GLOB SERPL: 1.7 G/DL
ALP SERPL-CCNC: 58 U/L (ref 30–99)
ALT SERPL-CCNC: 22 U/L (ref 2–50)
ANION GAP SERPL CALC-SCNC: 11 MMOL/L (ref 0–11.9)
APTT PPP: 23.4 SEC (ref 24.7–36)
AST SERPL-CCNC: 10 U/L (ref 12–45)
BASOPHILS # BLD AUTO: 0.3 % (ref 0–1.8)
BASOPHILS # BLD: 0.03 K/UL (ref 0–0.12)
BILIRUB SERPL-MCNC: 0.4 MG/DL (ref 0.1–1.5)
BUN SERPL-MCNC: 16 MG/DL (ref 8–22)
CALCIUM SERPL-MCNC: 9.1 MG/DL (ref 8.5–10.5)
CHLORIDE SERPL-SCNC: 111 MMOL/L (ref 96–112)
CO2 SERPL-SCNC: 18 MMOL/L (ref 20–33)
CREAT SERPL-MCNC: 0.86 MG/DL (ref 0.5–1.4)
CRP SERPL HS-MCNC: 0.4 MG/DL (ref 0–0.75)
EOSINOPHIL # BLD AUTO: 0.29 K/UL (ref 0–0.51)
EOSINOPHIL NFR BLD: 3.3 % (ref 0–6.9)
ERYTHROCYTE [DISTWIDTH] IN BLOOD BY AUTOMATED COUNT: 48 FL (ref 35.9–50)
ERYTHROCYTE [SEDIMENTATION RATE] IN BLOOD BY WESTERGREN METHOD: 3 MM/HOUR (ref 0–20)
GLOBULIN SER CALC-MCNC: 2.3 G/DL (ref 1.9–3.5)
GLUCOSE SERPL-MCNC: 103 MG/DL (ref 65–99)
HCT VFR BLD AUTO: 38.3 % (ref 37–47)
HGB BLD-MCNC: 12.9 G/DL (ref 12–16)
IMM GRANULOCYTES # BLD AUTO: 0.06 K/UL (ref 0–0.11)
IMM GRANULOCYTES NFR BLD AUTO: 0.7 % (ref 0–0.9)
INR PPP: 0.97 (ref 0.87–1.13)
LYMPHOCYTES # BLD AUTO: 1.84 K/UL (ref 1–4.8)
LYMPHOCYTES NFR BLD: 21.1 % (ref 22–41)
MCH RBC QN AUTO: 31 PG (ref 27–33)
MCHC RBC AUTO-ENTMCNC: 33.7 G/DL (ref 33.6–35)
MCV RBC AUTO: 92.1 FL (ref 81.4–97.8)
MONOCYTES # BLD AUTO: 0.73 K/UL (ref 0–0.85)
MONOCYTES NFR BLD AUTO: 8.4 % (ref 0–13.4)
NEUTROPHILS # BLD AUTO: 5.75 K/UL (ref 2–7.15)
NEUTROPHILS NFR BLD: 66.2 % (ref 44–72)
NRBC # BLD AUTO: 0 K/UL
NRBC BLD-RTO: 0 /100 WBC
PLATELET # BLD AUTO: 170 K/UL (ref 164–446)
PMV BLD AUTO: 11.5 FL (ref 9–12.9)
POTASSIUM SERPL-SCNC: 3.6 MMOL/L (ref 3.6–5.5)
PROT SERPL-MCNC: 6.3 G/DL (ref 6–8.2)
PROTHROMBIN TIME: 13.1 SEC (ref 12–14.6)
RBC # BLD AUTO: 4.16 M/UL (ref 4.2–5.4)
SODIUM SERPL-SCNC: 140 MMOL/L (ref 135–145)
WBC # BLD AUTO: 8.7 K/UL (ref 4.8–10.8)

## 2020-02-27 PROCEDURE — 80053 COMPREHEN METABOLIC PANEL: CPT

## 2020-02-27 PROCEDURE — 99214 OFFICE O/P EST MOD 30 MIN: CPT | Mod: GC | Performed by: PSYCHIATRY & NEUROLOGY

## 2020-02-27 PROCEDURE — 86140 C-REACTIVE PROTEIN: CPT

## 2020-02-27 PROCEDURE — 72131 CT LUMBAR SPINE W/O DYE: CPT

## 2020-02-27 PROCEDURE — 700105 HCHG RX REV CODE 258: Performed by: STUDENT IN AN ORGANIZED HEALTH CARE EDUCATION/TRAINING PROGRAM

## 2020-02-27 PROCEDURE — 73610 X-RAY EXAM OF ANKLE: CPT | Mod: LT

## 2020-02-27 PROCEDURE — 700111 HCHG RX REV CODE 636 W/ 250 OVERRIDE (IP): Performed by: EMERGENCY MEDICINE

## 2020-02-27 PROCEDURE — 93922 UPR/L XTREMITY ART 2 LEVELS: CPT

## 2020-02-27 PROCEDURE — 70450 CT HEAD/BRAIN W/O DYE: CPT

## 2020-02-27 PROCEDURE — 85652 RBC SED RATE AUTOMATED: CPT

## 2020-02-27 PROCEDURE — 73610 X-RAY EXAM OF ANKLE: CPT | Mod: RT

## 2020-02-27 PROCEDURE — 99220 PR INITIAL OBSERVATION CARE,LEVL III: CPT | Mod: GC | Performed by: INTERNAL MEDICINE

## 2020-02-27 PROCEDURE — 700102 HCHG RX REV CODE 250 W/ 637 OVERRIDE(OP): Performed by: INTERNAL MEDICINE

## 2020-02-27 PROCEDURE — G0378 HOSPITAL OBSERVATION PER HR: HCPCS

## 2020-02-27 PROCEDURE — 96375 TX/PRO/DX INJ NEW DRUG ADDON: CPT

## 2020-02-27 PROCEDURE — 97166 OT EVAL MOD COMPLEX 45 MIN: CPT

## 2020-02-27 PROCEDURE — 99215 OFFICE O/P EST HI 40 MIN: CPT | Performed by: PSYCHIATRY & NEUROLOGY

## 2020-02-27 PROCEDURE — A9270 NON-COVERED ITEM OR SERVICE: HCPCS | Performed by: INTERNAL MEDICINE

## 2020-02-27 PROCEDURE — 96374 THER/PROPH/DIAG INJ IV PUSH: CPT

## 2020-02-27 PROCEDURE — 97161 PT EVAL LOW COMPLEX 20 MIN: CPT

## 2020-02-27 PROCEDURE — 85730 THROMBOPLASTIN TIME PARTIAL: CPT

## 2020-02-27 PROCEDURE — 85025 COMPLETE CBC W/AUTO DIFF WBC: CPT

## 2020-02-27 PROCEDURE — 700101 HCHG RX REV CODE 250: Performed by: INTERNAL MEDICINE

## 2020-02-27 PROCEDURE — 700105 HCHG RX REV CODE 258: Performed by: EMERGENCY MEDICINE

## 2020-02-27 PROCEDURE — 700111 HCHG RX REV CODE 636 W/ 250 OVERRIDE (IP): Performed by: INTERNAL MEDICINE

## 2020-02-27 PROCEDURE — 85610 PROTHROMBIN TIME: CPT

## 2020-02-27 RX ORDER — ZIPRASIDONE HYDROCHLORIDE 40 MG/1
40 CAPSULE ORAL 2 TIMES DAILY
Status: DISCONTINUED | OUTPATIENT
Start: 2020-02-27 | End: 2020-03-24 | Stop reason: HOSPADM

## 2020-02-27 RX ORDER — PROMETHAZINE HYDROCHLORIDE 25 MG/1
25 SUPPOSITORY RECTAL EVERY 6 HOURS PRN
Status: DISCONTINUED | OUTPATIENT
Start: 2020-02-27 | End: 2020-03-24 | Stop reason: HOSPADM

## 2020-02-27 RX ORDER — PREGABALIN 100 MG/1
300 CAPSULE ORAL 2 TIMES DAILY
Status: DISCONTINUED | OUTPATIENT
Start: 2020-02-27 | End: 2020-03-24 | Stop reason: HOSPADM

## 2020-02-27 RX ORDER — PREDNISONE 5 MG/1
7.5 TABLET ORAL DAILY
Status: DISCONTINUED | OUTPATIENT
Start: 2020-02-27 | End: 2020-03-09

## 2020-02-27 RX ORDER — SODIUM CHLORIDE, SODIUM LACTATE, POTASSIUM CHLORIDE, CALCIUM CHLORIDE 600; 310; 30; 20 MG/100ML; MG/100ML; MG/100ML; MG/100ML
INJECTION, SOLUTION INTRAVENOUS CONTINUOUS
Status: DISCONTINUED | OUTPATIENT
Start: 2020-02-27 | End: 2020-02-28

## 2020-02-27 RX ORDER — SODIUM CHLORIDE 9 MG/ML
1000 INJECTION, SOLUTION INTRAVENOUS ONCE
Status: COMPLETED | OUTPATIENT
Start: 2020-02-27 | End: 2020-02-27

## 2020-02-27 RX ORDER — TRAZODONE HYDROCHLORIDE 100 MG/1
50 TABLET ORAL EVERY EVENING
Status: DISCONTINUED | OUTPATIENT
Start: 2020-02-27 | End: 2020-03-24 | Stop reason: HOSPADM

## 2020-02-27 RX ORDER — LIDOCAINE 50 MG/G
1 PATCH TOPICAL EVERY 24 HOURS
Status: DISCONTINUED | OUTPATIENT
Start: 2020-02-27 | End: 2020-03-24 | Stop reason: HOSPADM

## 2020-02-27 RX ORDER — ONDANSETRON 2 MG/ML
4 INJECTION INTRAMUSCULAR; INTRAVENOUS ONCE
Status: COMPLETED | OUTPATIENT
Start: 2020-02-27 | End: 2020-02-27

## 2020-02-27 RX ORDER — ONDANSETRON 4 MG/1
4 TABLET, ORALLY DISINTEGRATING ORAL EVERY 6 HOURS PRN
Status: DISCONTINUED | OUTPATIENT
Start: 2020-02-27 | End: 2020-03-24 | Stop reason: HOSPADM

## 2020-02-27 RX ORDER — ALBUTEROL SULFATE 90 UG/1
2 AEROSOL, METERED RESPIRATORY (INHALATION) EVERY 6 HOURS PRN
Status: DISCONTINUED | OUTPATIENT
Start: 2020-02-27 | End: 2020-03-24 | Stop reason: HOSPADM

## 2020-02-27 RX ORDER — POLYETHYLENE GLYCOL 3350 17 G/17G
1 POWDER, FOR SOLUTION ORAL
Status: DISCONTINUED | OUTPATIENT
Start: 2020-02-27 | End: 2020-02-28

## 2020-02-27 RX ORDER — MORPHINE SULFATE 4 MG/ML
4 INJECTION, SOLUTION INTRAMUSCULAR; INTRAVENOUS ONCE
Status: COMPLETED | OUTPATIENT
Start: 2020-02-27 | End: 2020-02-27

## 2020-02-27 RX ORDER — BUSPIRONE HYDROCHLORIDE 10 MG/1
10 TABLET ORAL 2 TIMES DAILY
Status: DISCONTINUED | OUTPATIENT
Start: 2020-02-27 | End: 2020-03-24 | Stop reason: HOSPADM

## 2020-02-27 RX ORDER — PHENOL 1.4 %
10 AEROSOL, SPRAY (ML) MUCOUS MEMBRANE EVERY EVENING
Status: DISCONTINUED | OUTPATIENT
Start: 2020-02-27 | End: 2020-02-27

## 2020-02-27 RX ORDER — LEVOTHYROXINE SODIUM 0.07 MG/1
75 TABLET ORAL
Status: DISCONTINUED | OUTPATIENT
Start: 2020-02-27 | End: 2020-03-02

## 2020-02-27 RX ORDER — AMOXICILLIN 250 MG
2 CAPSULE ORAL 2 TIMES DAILY
Status: DISCONTINUED | OUTPATIENT
Start: 2020-02-27 | End: 2020-02-28

## 2020-02-27 RX ORDER — ACETAMINOPHEN 325 MG/1
650 TABLET ORAL EVERY 6 HOURS PRN
Status: DISCONTINUED | OUTPATIENT
Start: 2020-02-27 | End: 2020-03-24 | Stop reason: HOSPADM

## 2020-02-27 RX ORDER — FLUOXETINE HYDROCHLORIDE 20 MG/1
40 CAPSULE ORAL DAILY
Status: DISCONTINUED | OUTPATIENT
Start: 2020-02-27 | End: 2020-03-24 | Stop reason: HOSPADM

## 2020-02-27 RX ORDER — BUDESONIDE AND FORMOTEROL FUMARATE DIHYDRATE 160; 4.5 UG/1; UG/1
2 AEROSOL RESPIRATORY (INHALATION) 2 TIMES DAILY
Status: DISCONTINUED | OUTPATIENT
Start: 2020-02-27 | End: 2020-03-24 | Stop reason: HOSPADM

## 2020-02-27 RX ORDER — PREGABALIN 150 MG/1
300 CAPSULE ORAL 2 TIMES DAILY
Status: DISCONTINUED | OUTPATIENT
Start: 2020-02-27 | End: 2020-02-27

## 2020-02-27 RX ORDER — ACETAZOLAMIDE 500 MG/1
500 CAPSULE, EXTENDED RELEASE ORAL 3 TIMES DAILY
Status: DISCONTINUED | OUTPATIENT
Start: 2020-02-27 | End: 2020-03-04

## 2020-02-27 RX ORDER — OXYCODONE HYDROCHLORIDE AND ACETAMINOPHEN 5; 325 MG/1; MG/1
1 TABLET ORAL EVERY 4 HOURS PRN
Status: DISCONTINUED | OUTPATIENT
Start: 2020-02-27 | End: 2020-03-24 | Stop reason: HOSPADM

## 2020-02-27 RX ORDER — OXCARBAZEPINE 150 MG/1
150 TABLET, FILM COATED ORAL 2 TIMES DAILY
Status: DISCONTINUED | OUTPATIENT
Start: 2020-02-27 | End: 2020-03-24 | Stop reason: HOSPADM

## 2020-02-27 RX ORDER — IPRATROPIUM BROMIDE AND ALBUTEROL SULFATE 2.5; .5 MG/3ML; MG/3ML
3 SOLUTION RESPIRATORY (INHALATION) EVERY 6 HOURS PRN
Status: DISCONTINUED | OUTPATIENT
Start: 2020-02-27 | End: 2020-03-08

## 2020-02-27 RX ORDER — MYCOPHENOLATE MOFETIL 250 MG/1
1000 CAPSULE ORAL 2 TIMES DAILY
Status: DISCONTINUED | OUTPATIENT
Start: 2020-02-27 | End: 2020-03-24 | Stop reason: HOSPADM

## 2020-02-27 RX ORDER — BISACODYL 10 MG
10 SUPPOSITORY, RECTAL RECTAL
Status: DISCONTINUED | OUTPATIENT
Start: 2020-02-27 | End: 2020-02-28

## 2020-02-27 RX ADMIN — ACETAZOLAMIDE 500 MG: 500 CAPSULE, EXTENDED RELEASE ORAL at 17:24

## 2020-02-27 RX ADMIN — BUSPIRONE HYDROCHLORIDE 10 MG: 10 TABLET ORAL at 05:20

## 2020-02-27 RX ADMIN — BUSPIRONE HYDROCHLORIDE 10 MG: 10 TABLET ORAL at 17:26

## 2020-02-27 RX ADMIN — ONDANSETRON 4 MG: 2 INJECTION INTRAMUSCULAR; INTRAVENOUS at 02:26

## 2020-02-27 RX ADMIN — ZIPRASIDONE HYDROCHLORIDE 40 MG: 40 CAPSULE ORAL at 06:29

## 2020-02-27 RX ADMIN — BUDESONIDE AND FORMOTEROL FUMARATE DIHYDRATE 2 PUFF: 160; 4.5 AEROSOL RESPIRATORY (INHALATION) at 17:24

## 2020-02-27 RX ADMIN — PREDNISONE 7.5 MG: 5 TABLET ORAL at 06:29

## 2020-02-27 RX ADMIN — SODIUM CHLORIDE 1000 ML: 9 INJECTION, SOLUTION INTRAVENOUS at 02:26

## 2020-02-27 RX ADMIN — OXYCODONE HYDROCHLORIDE AND ACETAMINOPHEN 1 TABLET: 5; 325 TABLET ORAL at 12:10

## 2020-02-27 RX ADMIN — OXYCODONE HYDROCHLORIDE AND ACETAMINOPHEN 1 TABLET: 5; 325 TABLET ORAL at 17:32

## 2020-02-27 RX ADMIN — ZIPRASIDONE HYDROCHLORIDE 40 MG: 40 CAPSULE ORAL at 17:27

## 2020-02-27 RX ADMIN — OXYCODONE HYDROCHLORIDE AND ACETAMINOPHEN 1 TABLET: 5; 325 TABLET ORAL at 05:19

## 2020-02-27 RX ADMIN — LEVOTHYROXINE SODIUM 75 MCG: 75 TABLET ORAL at 05:20

## 2020-02-27 RX ADMIN — ACETAZOLAMIDE 500 MG: 500 CAPSULE, EXTENDED RELEASE ORAL at 12:10

## 2020-02-27 RX ADMIN — LIDOCAINE 1 PATCH: 50 PATCH TOPICAL at 08:45

## 2020-02-27 RX ADMIN — ASPIRIN 81 MG: 81 TABLET, COATED ORAL at 05:20

## 2020-02-27 RX ADMIN — MYCOPHENOLATE MOFETIL 1000 MG: 250 CAPSULE ORAL at 17:26

## 2020-02-27 RX ADMIN — MORPHINE SULFATE 4 MG: 4 INJECTION INTRAVENOUS at 02:26

## 2020-02-27 RX ADMIN — OXCARBAZEPINE 150 MG: 150 TABLET, FILM COATED ORAL at 06:29

## 2020-02-27 RX ADMIN — MYCOPHENOLATE MOFETIL 1000 MG: 250 CAPSULE ORAL at 05:20

## 2020-02-27 RX ADMIN — PREGABALIN 300 MG: 100 CAPSULE ORAL at 05:20

## 2020-02-27 RX ADMIN — TRAZODONE HYDROCHLORIDE 50 MG: 100 TABLET ORAL at 17:26

## 2020-02-27 RX ADMIN — SENNOSIDES AND DOCUSATE SODIUM 2 TABLET: 8.6; 5 TABLET ORAL at 05:20

## 2020-02-27 RX ADMIN — ACETAZOLAMIDE 500 MG: 500 CAPSULE, EXTENDED RELEASE ORAL at 06:29

## 2020-02-27 RX ADMIN — SODIUM CHLORIDE, POTASSIUM CHLORIDE, SODIUM LACTATE AND CALCIUM CHLORIDE: 600; 310; 30; 20 INJECTION, SOLUTION INTRAVENOUS at 13:04

## 2020-02-27 RX ADMIN — PREGABALIN 300 MG: 100 CAPSULE ORAL at 17:24

## 2020-02-27 RX ADMIN — SODIUM CHLORIDE, POTASSIUM CHLORIDE, SODIUM LACTATE AND CALCIUM CHLORIDE: 600; 310; 30; 20 INJECTION, SOLUTION INTRAVENOUS at 21:15

## 2020-02-27 RX ADMIN — BUDESONIDE AND FORMOTEROL FUMARATE DIHYDRATE 2 PUFF: 160; 4.5 AEROSOL RESPIRATORY (INHALATION) at 06:29

## 2020-02-27 RX ADMIN — SENNOSIDES AND DOCUSATE SODIUM 2 TABLET: 8.6; 5 TABLET ORAL at 17:26

## 2020-02-27 RX ADMIN — OXCARBAZEPINE 150 MG: 150 TABLET, FILM COATED ORAL at 17:27

## 2020-02-27 RX ADMIN — FLUOXETINE HYDROCHLORIDE 40 MG: 20 CAPSULE ORAL at 05:20

## 2020-02-27 ASSESSMENT — COGNITIVE AND FUNCTIONAL STATUS - GENERAL
STANDING UP FROM CHAIR USING ARMS: TOTAL
MOBILITY SCORE: 10
WALKING IN HOSPITAL ROOM: TOTAL
HELP NEEDED FOR BATHING: A LOT
DRESSING REGULAR LOWER BODY CLOTHING: A LOT
SUGGESTED CMS G CODE MODIFIER DAILY ACTIVITY: CK
TOILETING: A LOT
CLIMB 3 TO 5 STEPS WITH RAILING: TOTAL
DRESSING REGULAR UPPER BODY CLOTHING: A LITTLE
SUGGESTED CMS G CODE MODIFIER DAILY ACTIVITY: CK
DAILY ACTIVITIY SCORE: 14
MOVING TO AND FROM BED TO CHAIR: A LOT
CLIMB 3 TO 5 STEPS WITH RAILING: TOTAL
HELP NEEDED FOR BATHING: A LOT
MOVING FROM LYING ON BACK TO SITTING ON SIDE OF FLAT BED: A LOT
PERSONAL GROOMING: A LOT
TOILETING: A LOT
MOVING FROM LYING ON BACK TO SITTING ON SIDE OF FLAT BED: A LOT
MOBILITY SCORE: 16
DRESSING REGULAR UPPER BODY CLOTHING: A LOT
WALKING IN HOSPITAL ROOM: TOTAL
SUGGESTED CMS G CODE MODIFIER MOBILITY: CK
SUGGESTED CMS G CODE MODIFIER MOBILITY: CL
TURNING FROM BACK TO SIDE WHILE IN FLAT BAD: A LITTLE
DRESSING REGULAR LOWER BODY CLOTHING: A LOT
DAILY ACTIVITIY SCORE: 17

## 2020-02-27 ASSESSMENT — PATIENT HEALTH QUESTIONNAIRE - PHQ9
3. TROUBLE FALLING OR STAYING ASLEEP OR SLEEPING TOO MUCH: NOT AT ALL
5. POOR APPETITE OR OVEREATING: NOT AT ALL
1. LITTLE INTEREST OR PLEASURE IN DOING THINGS: NOT AT ALL
SUM OF ALL RESPONSES TO PHQ QUESTIONS 1-9: 2
6. FEELING BAD ABOUT YOURSELF - OR THAT YOU ARE A FAILURE OR HAVE LET YOURSELF OR YOUR FAMILY DOWN: NOT AL ALL
8. MOVING OR SPEAKING SO SLOWLY THAT OTHER PEOPLE COULD HAVE NOTICED. OR THE OPPOSITE, BEING SO FIGETY OR RESTLESS THAT YOU HAVE BEEN MOVING AROUND A LOT MORE THAN USUAL: NOT AT ALL
7. TROUBLE CONCENTRATING ON THINGS, SUCH AS READING THE NEWSPAPER OR WATCHING TELEVISION: NOT AT ALL
9. THOUGHTS THAT YOU WOULD BE BETTER OFF DEAD, OR OF HURTING YOURSELF: NOT AT ALL
4. FEELING TIRED OR HAVING LITTLE ENERGY: SEVERAL DAYS
SUM OF ALL RESPONSES TO PHQ9 QUESTIONS 1 AND 2: 1
2. FEELING DOWN, DEPRESSED, IRRITABLE, OR HOPELESS: SEVERAL DAYS

## 2020-02-27 ASSESSMENT — LIFESTYLE VARIABLES
DOES PATIENT WANT TO STOP DRINKING: NO
TOTAL SCORE: 0
EVER FELT BAD OR GUILTY ABOUT YOUR DRINKING: NO
TOTAL SCORE: 0
HAVE YOU EVER FELT YOU SHOULD CUT DOWN ON YOUR DRINKING: NO
TOTAL SCORE: 0
ON A TYPICAL DAY WHEN YOU DRINK ALCOHOL HOW MANY DRINKS DO YOU HAVE: 0
HOW MANY TIMES IN THE PAST YEAR HAVE YOU HAD 5 OR MORE DRINKS IN A DAY: 0
HAVE PEOPLE ANNOYED YOU BY CRITICIZING YOUR DRINKING: NO
AVERAGE NUMBER OF DAYS PER WEEK YOU HAVE A DRINK CONTAINING ALCOHOL: 0
ALCOHOL_USE: NO
EVER_SMOKED: NEVER
EVER HAD A DRINK FIRST THING IN THE MORNING TO STEADY YOUR NERVES TO GET RID OF A HANGOVER: NO
CONSUMPTION TOTAL: NEGATIVE

## 2020-02-27 ASSESSMENT — ENCOUNTER SYMPTOMS
BACK PAIN: 1
SINUS PAIN: 0
EYE DISCHARGE: 0
SORE THROAT: 0
WHEEZING: 0
COUGH: 0
FEVER: 0
DEPRESSION: 0
EYE PAIN: 0
TINGLING: 0
BRUISES/BLEEDS EASILY: 0
SPEECH CHANGE: 0
SEIZURES: 0
VOMITING: 1
SENSORY CHANGE: 1
BLURRED VISION: 0
ABDOMINAL PAIN: 0
WEAKNESS: 1
STRIDOR: 0
DOUBLE VISION: 0
SPUTUM PRODUCTION: 0
NAUSEA: 1
DIZZINESS: 1
PND: 0
ORTHOPNEA: 0
CHILLS: 0
TREMORS: 0
WEIGHT LOSS: 0
BLOOD IN STOOL: 0
DIAPHORESIS: 0
HEADACHES: 1
EYE REDNESS: 0
LOSS OF CONSCIOUSNESS: 0
FOCAL WEAKNESS: 0
PALPITATIONS: 0
SHORTNESS OF BREATH: 0

## 2020-02-27 ASSESSMENT — ACTIVITIES OF DAILY LIVING (ADL): TOILETING: REQUIRES ASSIST

## 2020-02-27 NOTE — CONSULTS
"Hospital Neurology Consult:    Referring Physician: Kadeem Alvarenga M.D.    Reason for consultation: leg weakness/numbness    HPI: Kristin Balderrama is a 29 y.o. female with history of CRION, sleep apnea, obesity, borderline personality disorder, diabetes, falls, headaches currently on Diamox, presenting to the hospital for fall and consulted for lower extremity numbness and weakness.  The patient has had multiple admissions to the hospital, as well as multiple neurology consults without clear etiology of multiple neurologic symptoms including tetraparesis, numbness and sensory symptoms of fluctuate in location and severity.  At times, she has received steroids however last consult was in December 2019 at which time she was not given steroids for her symptoms.  Earlier this month, she was admitted to the hospital for evaluation of headache and a lumbar puncture was performed with an opening pressure of 28 cmH2O.  She was then started on Diamox.  She left the hospital, and while transferring at home (uses a \"block\" to transfer), she had a fall which she describes as hitting the site of the lumbar puncture with a knob on a drawer.  Subsequently, she wrapped her feet and went to sleep and when she woke up she noted that she was numb from the \"knees down\".  She states that she \"has no feelings from the knee down\".  Subsequently, she came to the hospital for further evaluation and neurology was consulted for the symptoms.  She notes recent stressors of a cat dying and her grandfather's dementia getting worse.  She states that she is still \"getting over an illness\" in which she is \"coughing up green gunk and coughing up blood\".    The patient has been unable to walk since December 2019.  She states that she needs a block to transfer and that whenever she walks \"her legs splay\".  Prior to this current hospitalization she was able to wiggle her toes, lift her legs and do circles with her legs in the air while she is on " the bed.    ROS:     As above. All other systems reviewed and are negative.    Past Medical History:    has a past medical history of Abdominal pain, Anginal syndrome, Apnea, sleep, Arrhythmia, Arthritis, ASTHMA, Atrial fibrillation (HCC), Back pain, Borderline personality disorder ([facility]), Breath shortness, Bronchitis, Cardiac arrhythmia, Chickenpox, Chronic UTI (9/18/2010), Cough, Daytime sleepiness, Depression, Diabetes (HCC), Diarrhea, Disorder of thyroid, Fall, Fatigue, Frequent headaches, Gasping for breath, Gynecological disorder, Headache(784.0), Heart burn, History of falling, Hypertension, Indigestion, Migraine, 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HCC), 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    FHx:  family history includes Genitourinary () Problems in her sister; Heart Disease in her maternal grandmother and mother; Hypertension in her maternal grandmother, maternal uncle, and mother; No Known Problems in her sister; Other in her mother and sister; Sleep Apnea in her mother.    SHx:   reports that she has never smoked. She has never used smokeless tobacco. She reports previous drug use. Frequency: 7.00 times per week. Drugs: Marijuana and Oral. She reports that she does not drink alcohol.    Allergies:  Allergies   Allergen Reactions   • Cefdinir Shortness of Breath and Itching     Tolerated 1/18/17  Tolerates ceftriaxone  Tolerated augmentin 8/2019    • Depakote [Divalproex Sodium] Unspecified     Muscle spasms/muscle pain and weakness     • Doxycycline Anaphylaxis and Vomiting     RXN=unknown   • Amitriptyline Unspecified     Headaches     • Aripiprazole [Abilify] Unspecified      "Headaches/muscle twitching     • Clindamycin Nausea     Even with food     • Flagyl [Metronidazole Hcl] Unspecified     \"eye problems\"     • Flomax [Tamsulosin Hydrochloride] Swelling   • Levaquin Unspecified     Severe muscle cramps in legs  RXN=unknown   • Metformin Unspecified     Increased lactic acid      • Tape Rash     Tears skin off  coban with Tegaderm tape ok intermittently  RXN=ongoing   • Vancomycin Itching     Pt becomes flushed in face and chest.   RXN=7/10/16   • Wound Dressing Adhesive Hives     By pt report   • Ciprofloxacin    • Depakote  [Divalproex Sodium]    • Hydromorphone Hcl    • Kdc:Metronidazole+Tartrazine    • Keflex Rash     Pt states she gets a rash with this medication  Tolerates ceftriaxone   • Levofloxacin Unspecified     Leg muscle cramps   • Penicillins        Medications:    Current Facility-Administered Medications:   •  senna-docusate (PERICOLACE or SENOKOT S) 8.6-50 MG per tablet 2 Tab, 2 Tab, Oral, BID, 2 Tab at 02/27/20 0520 **AND** polyethylene glycol/lytes (MIRALAX) PACKET 1 Packet, 1 Packet, Oral, QDAY PRN **AND** magnesium hydroxide (MILK OF MAGNESIA) suspension 30 mL, 30 mL, Oral, QDAY PRN **AND** bisacodyl (DULCOLAX) suppository 10 mg, 10 mg, Rectal, QDAY PRN, Tony Way M.D.  •  acetaminophen (TYLENOL) tablet 650 mg, 650 mg, Oral, Q6HRS PRN, Tony Way M.D.  •  albuterol inhaler 2 Puff, 2 Puff, Inhalation, Q6HRS PRN, Tony Way M.D.  •  aspirin EC (ECOTRIN) tablet 81 mg, 81 mg, Oral, DAILY, Tony Way M.D., 81 mg at 02/27/20 0520  •  budesonide-formoterol (SYMBICORT) 160-4.5 MCG/ACT inhaler 2 Puff, 2 Puff, Inhalation, BID, Tony Way M.D., 2 Puff at 02/27/20 0629  •  busPIRone (BUSPAR) tablet 10 mg, 10 mg, Oral, BID, Tony Way M.D., 10 mg at 02/27/20 0520  •  FLUoxetine (PROZAC) capsule 40 mg, 40 mg, Oral, DAILY, Tony Way M.D., 40 mg at 02/27/20 0520  •  ipratropium-albuterol (DUONEB) nebulizer solution, 3 mL, Nebulization, Q6HRS PRN, Tony" RUFUS Way  •  levothyroxine (SYNTHROID) tablet 75 mcg, 75 mcg, Oral, AM ES, Tony Way M.D., 75 mcg at 02/27/20 0520  •  mycophenolate (CELLCEPT) capsule 1,000 mg, 1,000 mg, Oral, BID, Tony Way M.D., 1,000 mg at 02/27/20 0520  •  ondansetron (ZOFRAN ODT) dispertab 4 mg, 4 mg, Oral, Q6HRS PRN, Tony Way M.D.  •  OXcarbazepine (TRILEPTAL) tablet 150 mg, 150 mg, Oral, BID, Tony Way M.D., 150 mg at 02/27/20 0629  •  promethazine (PHENERGAN) suppository 25 mg, 25 mg, Rectal, Q6HRS PRN, Tony Way M.D.  •  traZODone (DESYREL) tablet 50 mg, 50 mg, Oral, Q EVENING, Tony Way M.D.  •  ziprasidone (GEODON) capsule 40 mg, 40 mg, Oral, BID, Tony Way M.D., 40 mg at 02/27/20 0629  •  Ivabradine HCl TABS 7.5 mg, 7.5 mg, Oral, BID, Tony Way M.D., 7.5 mg at 02/27/20 0630  •  acetaZOLAMIDE SR (DIAMOX) capsule 500 mg, 500 mg, Oral, TID, Tony Way M.D., 500 mg at 02/27/20 1210  •  predniSONE (DELTASONE) tablet 7.5 mg, 7.5 mg, Oral, DAILY, Tony Way M.D., 7.5 mg at 02/27/20 0629  •  oxyCODONE-acetaminophen (PERCOCET) 5-325 MG per tablet 1 Tab, 1 Tab, Oral, Q4HRS PRN, Tony Way M.D., 1 Tab at 02/27/20 1210  •  lidocaine (LIDODERM) 5 % 1 Patch, 1 Patch, Transdermal, Q24HR, Tony Way M.D., 1 Patch at 02/27/20 0845  •  pregabalin (LYRICA) capsule 300 mg, 300 mg, Oral, BID, Tony Way M.D., 300 mg at 02/27/20 0520  •  lactated ringers infusion, , Intravenous, Continuous, Ortiz Camarillo M.D., Stopped at 02/27/20 0845    Vitals:   Vitals:    02/27/20 0247 02/27/20 0400 02/27/20 0530 02/27/20 0800   BP: 120/68 116/55 122/67 113/43   Pulse: 67 63 62 67   Resp:   16 16   Temp:   36.1 °C (97 °F) 36.1 °C (97 °F)   TempSrc:   Temporal Temporal   SpO2: 98% 97% 97% 96%   Weight:       Height:           Labs:  Lab Results   Component Value Date/Time    PROTHROMBTM 13.1 02/27/2020 01:10 AM    INR 0.97 02/27/2020 01:10 AM      Lab Results   Component Value Date/Time    WBC 8.7 02/27/2020 02:49  AM    WBC 6.1 07/20/2010 11:00 AM    RBC 4.16 (L) 02/27/2020 02:49 AM    RBC 4.38 07/20/2010 11:00 AM    HEMOGLOBIN 12.9 02/27/2020 02:49 AM    HEMATOCRIT 38.3 02/27/2020 02:49 AM    MCV 92.1 02/27/2020 02:49 AM    MCV 93 07/20/2010 11:00 AM    MCH 31.0 02/27/2020 02:49 AM    MCH 30.1 07/20/2010 11:00 AM    MCHC 33.7 02/27/2020 02:49 AM    MPV 11.5 02/27/2020 02:49 AM    NEUTSPOLYS 66.20 02/27/2020 02:49 AM    LYMPHOCYTES 21.10 (L) 02/27/2020 02:49 AM    MONOCYTES 8.40 02/27/2020 02:49 AM    EOSINOPHILS 3.30 02/27/2020 02:49 AM    BASOPHILS 0.30 02/27/2020 02:49 AM    ANISOCYTOSIS 1+ 07/08/2019 04:04 PM      Lab Results   Component Value Date/Time    SODIUM 140 02/27/2020 02:20 AM    POTASSIUM 3.6 02/27/2020 02:20 AM    CHLORIDE 111 02/27/2020 02:20 AM    CO2 18 (L) 02/27/2020 02:20 AM    GLUCOSE 103 (H) 02/27/2020 02:20 AM    BUN 16 02/27/2020 02:20 AM    CREATININE 0.86 02/27/2020 02:20 AM    CREATININE 0.75 (L) 07/20/2010 11:00 AM    BUNCREATRAT 19 07/20/2010 11:00 AM    GLOMRATE >59 07/20/2010 11:00 AM      Lab Results   Component Value Date/Time    CHOLSTRLTOT 150 11/08/2019 04:30 AM    LDL 70 11/08/2019 04:30 AM    HDL 50 11/08/2019 04:30 AM    TRIGLYCERIDE 149 11/08/2019 04:30 AM       Lab Results   Component Value Date/Time    ALKPHOSPHAT 58 02/27/2020 02:20 AM    ASTSGOT 10 (L) 02/27/2020 02:20 AM    ALTSGPT 22 02/27/2020 02:20 AM    TBILIRUBIN 0.4 02/27/2020 02:20 AM        Lab Results   Component Value Date/Time    FREET4 0.97 07/16/2019 11:34 AM    FREET4 1.05 07/11/2019 02:07 AM     Imaging/Testing:  CT head without contrast on 2/27/2020 was personally reviewed and was within normal limits.    CT L-spine on 2/27/2020 reviewed in chart.    Physical Exam:     General: Somnolent appearing 29-year-old female in bed in no acute distress.  Cardio: Normal S1/S2. No peripheral edema.   Pulm: CTAX2. No respiratory distress.   Skin: Warm, dry, no rashes or lesions   Psychiatric: Appropriate affect. No active  psychosis.  HEENT: Atraumatic head, normal sclera and conjunctiva, moist oral mucosa. No lid lag.  Abdomen: Soft, non tender. No masses or hepatosplenomegaly.  Obese abdomen.    Neurologic:  Mental Status:  AAOx4. Able to follow commands/cross midline. Speech fluent/nondysarthric. Language functions intact. No neglect/apraxia.  Cranial Nerves:  PERRL. EOMi. Face symmetric, palate/tongue midline. Visual fields full to confrontation. Facial sensation intact.   Motor:  Normal muscle tone and bulk.  Strength is 5/5 in the left upper extremity and 4+/5 in the right upper extremity with giveaway weakness on tricep extension.  Lower extremity hip flexor is 2/5 bilaterally limited due to effort given a positive Morris sign.  Knee extensors are 3/5 bilaterally and the patient is able to wiggle her toes however foot dorsiflexion and plantarflexion is 2/5.  No abnormal movements.  Reflexes: 2/4 throughout with flexor plantar responses bilaterally.  Coordination: Finger-to-nose without ataxia  Sensation: Diminished to light touch from the knee down bilaterally on lower extremities however this is inconsistent as when the examiner is touching and extremity by the foot and asked the patient to wiggle the toes she will wiggle the toes of the side the examiner is touching (an area which the patient reports to not feel).  Gait/Station: Unable to assess    Assessment/Plan:    Kristin Balderrama is a 29 y.o. female with history of CRION, sleep apnea, obesity, borderline personality disorder, diabetes, falls, headaches currently on Diamox, presenting to the hospital for fall and consulted for lower extremity numbness and weakness.  At this time, the patient presents with numbness from the knee down bilaterally however this is an inconsistent finding (see above exam for further explanation), and there is also evidence of mixed effort when asked to move the bilateral lower extremities.  Given that the distribution of numbness is  "non-anatomical and given that her reflexes are intact, I do not suspect that this is a lesion in the upper or lower motor neuron of the spinal cord.  She has been having difficulty walking for over 6 weeks however at this time I am unsure if there is an organic cause for her weakness.  I explained to the patient that her symptoms may be secondary to conversion disorder, a diagnoses that she reports to have received previously from Dr. Fox in neurology, however she states some emotional distress due to \"whenever people see conversion disorder in the chart I am not taken seriously\".  I assured her that I did not believe that she was \"faking\" her symptoms however I explained to her that at this point her weakness and sensory symptoms could be helped by some physical therapy as well as addressing her underlying mood disorder.  She agreed to speak to psychiatry.    Plan:  1.  At this time no indication for further imaging.  Unfortunately, the patient may not have an MRI either due to her spinal cord stimulators.  2.  Consult psychiatry  3.  No indication for steroids  4.  Management of headache per primary team.  5.  Call with further questions  6.  Plan discussed with consulting physician and patient's nurse.       Ronny Koroma M.D., Diplomat of the American Board of Psychiatry and Neurology  Diplomat of [facility]   Assistant Clinical Professor, [facility] Neurology Consultant        "

## 2020-02-27 NOTE — PROGRESS NOTES
2 RN skin check performed with SONYA Garza. Bilateral feet dry, calloused, flaky. Two red circular scabs to abdomen and two circular red scabs to breasts. No other skin issues noted.

## 2020-02-27 NOTE — ED TRIAGE NOTES
Chief Complaint   Patient presents with   • Back Pain   • Ankle Pain     bilateral ankle pain after fall     Pt to triage via wheelchair. Pt was seen here two days ago and had a lumbar puncture. Today pt slipped and fell and twisted bilateral ankles and hit her back on a dresser knob at her LP site. Pt reporting back pain and bilateral ankle pain. Pt states her legs started to feel numb after the fall. Pt has been wheelchair bound for the past month and cannot walk at baseline, but states the numbness is new after the fall.

## 2020-02-27 NOTE — THERAPY
"Physical Therapy Evaluation completed.   Bed Mobility:  Supine to Sit: Supervised  Transfers: Sit to Stand: (Pt reports unable)  Gait: Level Of Assist: (pt reports unable. )        Plan of Care: Patient with no further skilled PT needs in the acute care setting at this time  Discharge Recommendations: Equipment: No Equipment Needed. Post-acute therapy Discharge to home with  home health for additional skilled therapy services.      Eval done, pt showing inconsistent abilty to move LEs, but pt reports unable when asked. Pt was supervised for supine to sit, pt used her hands to move her legs across mattress. Pt was supervised for seated scoot from EOB into her w/c. Pt agreeable to sit up after therapy. Pillows placed under pt's feet due to pt left her leg rests at home. Pt reports that this has been her routine lately at home, scooting to her w/c from bed without help. Pt appears to be at her baseline function. No further inpt PT needs. Patient will not be actively followed for physical therapy services at this time, however may be seen if requested by physician for 1 more visit within 30 days to address any discharge or equipment needs   See \"Rehab Therapy-Acute\" Patient Summary Report for complete documentation.     "

## 2020-02-27 NOTE — THERAPY
"Occupational Therapy Evaluation completed.   Functional Status:  Pt seen for OT evaluation today following fall at home. Pt performed bed mobility with supervision, UB ADLs supervision, F+ balance sitting eob, able to perform seated scoot t/f to w/c and uses BUE's to move her legs. Pt with inconsistent function of LE's such as when asked to lift her legs pt unable to do so; however while donning socks therapist took support away and pt was able to maintain her leg raise w/o difficulty bilaterally. Per pt, grandmother helps and performs LB ADLs and has all the required DME, she only performs seated scoot t/f's to BSC, tub t/f bench and w/c. Pt appears to be near her baseline and would recommend continue with HH therapy. Doesn't demonstrate the need for acute OT services but will remain available for d/c needs.   Plan of Care: Patient with no further skilled OT needs in the acute care setting at this time  Discharge Recommendations:  Equipment: No Equipment Needed. Post-acute therapy Recommend home health for continued occupational therapy services.       See \"Rehab Therapy-Acute\" Patient Summary Report for complete documentation.    "

## 2020-02-27 NOTE — ASSESSMENT & PLAN NOTE
TSH 5.65  Increased Levothyroxine to 100mcg on 3/3  Continue levothyroxine, Consider TSH/ with reflex 4/3/20

## 2020-02-27 NOTE — CONSULTS
"PSYCHIATRIC CONSULTATION:  Reason for admission: Bilateral leg numbness following a fall   Reason for consult:\"Conversion Disorder\"   Requesting Physician: Kadeem Alvarenga M.D.  Supervising Physician: Alexandria Sigala M.D.    Legal status:  not applicable    Chief Complaint: \" I really do not want to be diagnosed with conversion disorder    HPI:   Patient is a 29 y.o. female with history of schizoaffective, personality disorder, EVELIA, chronic pain, migraines with optic neuritis  who presented to the hospital for a ground level fall. Patient recently discharghed from the hospital on 2/25/20 after a therapeutic lumbar puncture and started on Diamox. She reportedly fell due to feeling light headed and dizzy when she was transferring from the commode. Patient was evaluated by neurology and did not find a cause for the patient's presentation. Psychiatry was consulted for evaluation for functional neurological symptoms.     Patient was initially very reluctant to talk to psychiatry. \"You are just going to tell me that everything is all in my head. Then they wont take me seriously and no longer investigate the reason behind all of this.\" Patient believes her current presentation is the result of an autoimmune disorder or a mitochondrial disease. Patient states her weakness in her lower extremities began in December 2019 after a fall that resulted in her getting a concussion.  She she states after that she began to have problems standing on her legs a wheelchair.  Patient states that after that hospitalization she was sent to [facility] in order to do physical therapy.  She states while she was admitted there Medicaid would no longer pay due to her lack of progress.  She was then transferred back to home with a home health aide.  Patient reports that her physical therapy during home health was just in bed physical therapy.    Patient reports a lot of her weakness is also the result of pain in her back hips, and " "knees.  She reports most of this is due to degenerative disease and her fibromyalgia.  She states that laying down in bed reduces the pain and is exacerbated by physical movement.  She states despite this she was able to tolerate physical therapy.    She reports that after her fall yesterday she is now have not numbness appear in her lower extremities.  She states this is a new finding.    Psychiatric Review of Systems:current symptoms as reported by pt.  Depression: not depressed, no anhedonia, no wieght/appetite changes, no sleep disturbance, no fatigue, no feelings of guilt/worthless/hopeless, no difficulty with concentration and no suicidal ideation  Yanely: No signs or symptoms indicative of yanely  Anxiety/Panic Attacks: No signs or symptoms indicative of anxiety  PTSD symptom: Patient reports no signs or symptoms indicative of PTSD  Psychosis: Patient reports no signs or symptoms indicative of psychosis    Medical Review of Systems:  Constitutional: Negative for fever, chills, weight loss  HENT: Negative for hearing loss, sore throat, neck pain  Eyes: Negative for blurred vision, pain, redness.   Respiratory: Negative for cough, shortness of breath, wheezing   Cardiovascular: Negative for chest pain, palpitations  Gastrointestinal: Negative for nausea, vomiting, diarrhea, constipation, blood in stool  Genitourinary: Negative for dysuria, urgency, frequency, hematuria  Musculoskeletal: Negative for myalgias,  joint pain  Skin: Negative for itching and rash.  Neurological: Negative for dizziness, tingling, tremors, weakness and headaches.   Psychiatric/Behavioral: See above for psych review of systems    Psychiatric Examination:  Vitals: /64   Pulse 72   Temp 36.1 °C (97 °F) (Temporal)   Resp 16   Ht 1.651 m (5' 5\")   Wt 118.4 kg (261 lb)   SpO2 95%   BMI 43.43 kg/m²   Musculoskeletal: On exam patient's feet are naturally plantar flex and internally rotated.  Patient had 2/5 in the lower extremities. " " Morris sign was not elicited.  Clonus was noted in left foot.  Abnormal Movements: None noted  Gait and Posture: Patient was lying in bed unsupported.  Gait was not assessed  Appearance: Young  female in hospital gown.  Shaved head obese and appropriately dressed, cooperative and poor eye contact  Thought Process: { linear, coherent, goal-oriented and organized  Abnormal or Psychotic Thoughts: No overt delusions noted and patient denies SI and HI  Speech: regular rate, rhythm, volume, tone, and syntax  Mood: \"ok\"  Affect: euthymic and congruent with mood  SI/HI: Denies SI and HI  Orientation: alert and oriented  Recent and Remote Memory: no gross impairment in immediate, recent, or remote memory  Fund of Knowledge: adequate  Attention Span and Concentration: No problems with concentration   Insight/Judgement into symptoms: fair  Neurological Testing (MSSE Score and/or clock drawing): MMSE not performed during this encounter    Past Medical History  TBIs: denies  SZs: denies  Strokes: denies  Thyroid: denies  Diabetes: denies  Cardiovascular disease: denies    Past Psychiatric Hx:  Diagnoses: Patient reports the following previous diagnoses:, MDD, Anxiety, Schizophrenia, Borderline PD, and Functional Neurological Symptoms   Inpatient: Multiple inpatient hospitalizations at [facility]  Outpatient: Currently sees Dr. Lutz for psychiatric medication management.  Patient denies any individual therapy.  Medications: Currently on fluoxetine 40 mg once a day, Geodon 40 mg twice daily, BuSpar 10 mg twice daily, Trileptal 150 mg daily, and trazodone 50 mg nightly      Family Psychiatric Hx:  Mom: 'Personality stuff'  Sibling ' messed up'     Social Hx:  Patient currently lives with grandmother in [place].  She is currently unemployed and collects [employer].  She gets about $790 per month.  She is single without any kids.  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  Patient reports that she has gotten her high school " "diploma.  Has not had any legal problems.    Drug/Alcohol/Tobacco Hx:  Drugs: Patient reports the use of the following drugs: and marijuana  Alcohol: Patient denies the use of alcohol  Tobacco: Patient denies the use of tobacco products    Medical Hx: labs, MARS, medications, etc were reviewed. Only those findings of potential interest to psychiatry are noted below:    Medical Conditions:   Past Medical History:   Diagnosis Date   • Abdominal pain    • Anginal syndrome     random chest pain especially with tachycardia   • Apnea, sleep    • Arrhythmia     \"sinus tachycardia\", cariologist, Dr. Kumar; ablation 2/2016   • Arthritis     osteo   • ASTHMA     when around smoke   • Atrial fibrillation (HCC)    • Back pain    • Borderline personality disorder (HCC)    • Breath shortness     with tachycardia   • Bronchitis    • Cardiac arrhythmia    • Chickenpox    • Chronic UTI 9/18/2010   • Cough    • Daytime sleepiness    • Depression    • Diabetes (HCC)    • Diarrhea    • Disorder of thyroid    • Fall    • Fatigue    • Frequent headaches    • Gasping for breath    • Gynecological disorder     PCOS   • Headache(784.0)    • Heart burn    • History of falling    • Hypertension    • Indigestion    • Migraine    • 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HCC)    • 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HCC)    • Nausea    • Obesity    • Pain 08-15-12    back, 7/10   • Painful joint    • PCOS (polycystic ovarian syndrome)    • Pneumonia 2012   • Psychosis (HCC)    • Renal disorder     \"kidney disease, stage 1\" nephrologist, Dr. 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   • Urinary bladder disorder     Suprapubic cath   • Urinary incontinence    • Weakness    • Wears glasses      Allergies:   Allergies   Allergen Reactions   • Cefdinir " "Shortness of Breath and Itching     Tolerated 1/18/17  Tolerates ceftriaxone  Tolerated augmentin 8/2019    • Depakote [Divalproex Sodium] Unspecified     Muscle spasms/muscle pain and weakness     • Doxycycline Anaphylaxis and Vomiting     RXN=unknown   • Amitriptyline Unspecified     Headaches     • Aripiprazole [Abilify] Unspecified     Headaches/muscle twitching     • Clindamycin Nausea     Even with food     • Flagyl [Metronidazole Hcl] Unspecified     \"eye problems\"     • Flomax [Tamsulosin Hydrochloride] Swelling   • Levaquin Unspecified     Severe muscle cramps in legs  RXN=unknown   • Metformin Unspecified     Increased lactic acid      • Tape Rash     Tears skin off  coban with Tegaderm tape ok intermittently  RXN=ongoing   • Vancomycin Itching     Pt becomes flushed in face and chest.   RXN=7/10/16   • Wound Dressing Adhesive Hives     By pt report   • Ciprofloxacin    • Depakote  [Divalproex Sodium]    • Hydromorphone Hcl    • Kdc:Metronidazole+Tartrazine    • Keflex Rash     Pt states she gets a rash with this medication  Tolerates ceftriaxone   • Levofloxacin Unspecified     Leg muscle cramps   • Penicillins      Medications (currently prescribed at [facility]):    Current Facility-Administered Medications:   •  senna-docusate (PERICOLACE or SENOKOT S) 8.6-50 MG per tablet 2 Tab, 2 Tab, Oral, BID, 2 Tab at 02/27/20 0520 **AND** polyethylene glycol/lytes (MIRALAX) PACKET 1 Packet, 1 Packet, Oral, QDAY PRN **AND** magnesium hydroxide (MILK OF MAGNESIA) suspension 30 mL, 30 mL, Oral, QDAY PRN **AND** bisacodyl (DULCOLAX) suppository 10 mg, 10 mg, Rectal, QDAY PRN, Tony Way M.D.  •  acetaminophen (TYLENOL) tablet 650 mg, 650 mg, Oral, Q6HRS PRN, Tony Way M.D.  •  albuterol inhaler 2 Puff, 2 Puff, Inhalation, Q6HRS PRN, Tony Way M.D.  •  aspirin EC (ECOTRIN) tablet 81 mg, 81 mg, Oral, DAILY, Tony Way M.D., 81 mg at 02/27/20 0520  •  budesonide-formoterol (SYMBICORT) 160-4.5 MCG/ACT inhaler 2 " Puff, 2 Puff, Inhalation, BID, Tony Way M.D., 2 Puff at 02/27/20 0629  •  busPIRone (BUSPAR) tablet 10 mg, 10 mg, Oral, BID, Tony Way M.D., 10 mg at 02/27/20 0520  •  FLUoxetine (PROZAC) capsule 40 mg, 40 mg, Oral, DAILY, Tony Way M.D., 40 mg at 02/27/20 0520  •  ipratropium-albuterol (DUONEB) nebulizer solution, 3 mL, Nebulization, Q6HRS PRN, Tony Way M.D.  •  levothyroxine (SYNTHROID) tablet 75 mcg, 75 mcg, Oral, AM ES, Tony Way M.D., 75 mcg at 02/27/20 0520  •  mycophenolate (CELLCEPT) capsule 1,000 mg, 1,000 mg, Oral, BID, Tony Way M.D., 1,000 mg at 02/27/20 0520  •  ondansetron (ZOFRAN ODT) dispertab 4 mg, 4 mg, Oral, Q6HRS PRN, Tony Way M.D.  •  OXcarbazepine (TRILEPTAL) tablet 150 mg, 150 mg, Oral, BID, Tony Way M.D., 150 mg at 02/27/20 0629  •  promethazine (PHENERGAN) suppository 25 mg, 25 mg, Rectal, Q6HRS PRN, Tony Way M.D.  •  traZODone (DESYREL) tablet 50 mg, 50 mg, Oral, Q EVENING, Tony Way M.D.  •  ziprasidone (GEODON) capsule 40 mg, 40 mg, Oral, BID, Tony Way M.D., 40 mg at 02/27/20 0629  •  Ivabradine HCl TABS 7.5 mg, 7.5 mg, Oral, BID, Tony Way M.D., 7.5 mg at 02/27/20 0630  •  acetaZOLAMIDE SR (DIAMOX) capsule 500 mg, 500 mg, Oral, TID, Tony Way M.D., 500 mg at 02/27/20 1210  •  predniSONE (DELTASONE) tablet 7.5 mg, 7.5 mg, Oral, DAILY, Tony Way M.D., 7.5 mg at 02/27/20 0629  •  oxyCODONE-acetaminophen (PERCOCET) 5-325 MG per tablet 1 Tab, 1 Tab, Oral, Q4HRS PRN, Tony Wya M.D., 1 Tab at 02/27/20 1210  •  lidocaine (LIDODERM) 5 % 1 Patch, 1 Patch, Transdermal, Q24HR, Tony Way M.D., 1 Patch at 02/27/20 0845  •  pregabalin (LYRICA) capsule 300 mg, 300 mg, Oral, BID, Tony Way M.D., 300 mg at 02/27/20 0520  •  lactated ringers infusion, , Intravenous, Continuous, Ortiz Camarillo M.D., Last Rate: 125 mL/hr at 02/27/20 1304  Labs:  Recent Labs     02/27/20  0249   WBC 8.7   RBC 4.16*   HEMOGLOBIN 12.9   HEMATOCRIT  38.3   MCV 92.1   MCH 31.0   MCHC 33.7   RDW 48.0   PLATELETCT 170   MPV 11.5     Recent Labs     20  0220   SODIUM 140   POTASSIUM 3.6   CHLORIDE 111   CO2 18*   GLUCOSE 103*   BUN 16   CREATININE 0.86   CALCIUM 9.1     Recent Labs     20  0110   APTT 23.4*   INR 0.97                      ECG:   Results for orders placed or performed during the hospital encounter of 20   EKG   Result Value Ref Range    Report       [facility]    Test Date:  2020  Pt Name:    HARLEY DE LA CRUZ              Department: [facility]  MRN:        [id number]                      Room:       [facility]  Gender:     Female                       Technician: RAVI  :        1990                   Requested By:BRETT ABARCA  Order #:    [id number]                    Reading MD: David Hanson MD    Measurements  Intervals                                Axis  Rate:       73                           P:          21  AR:         140                          QRS:        20  QRSD:       88                           T:          42  QT:         408  QTc:        450    Interpretive Statements  SINUS RHYTHM  Compared to ECG 2019 16:21:31  No significant changes  Electronically Signed On 2020 4:58:56 PST by David Hanson MD       *Note: Due to a large number of results and/or encounters for the requested time period, some results have not been displayed. A complete set of results can be found in Results Review.       Cranial Imaging: reviewed  IR-US GUIDED PIV   Final Result    Ultrasound-guided PERIPHERAL IV INSERTION performed by    qualified nursing staff as above.            US-JOSHUA SINGLE LEVEL BILAT   Final Result      DX-ANKLE 3+ VIEWS LEFT   Final Result         1.  No acute traumatic bony injury.         DX-ANKLE 3+ VIEWS RIGHT   Final Result         1.  No acute traumatic bony injury.         CT-HEAD W/O   Final Result         1.  No acute intracranial abnormality.      CT-LSPINE W/O PLUS RECONS    Final Result         1.  No acute traumatic bony injury of the lumbar spine.   2.  Punctate left renal calculus without visualized obstructive changes.            Assessment/Formulation:   Ms. Balderrama is a 29-year-old white female with a history of schizophrenia, depression, and borderline personality disorder who was admitted for a ground-level fall.  On chart review, it appears patient has a history of transverse myelitis which b can be contributing to her abnormal gait, falls and bilateral lower extremity weakness.  Psychiatry was consulted to assess for functional neurological symptoms.  Patient most likely has functional neurological symptoms.  Unable to fully tell because the patient is unable to have an MRI due to spinal hardware.  On physical exam there were inconsistencies with muscle strength and sensation.  Patient would benefit greatly from physical therapy and possibly rehabilitation if appropriate.  Medications will be reviewed in order to decrease some of them.  Patient during rounds was incredibly somnolent.    Diagnosis:  Psychiatric:   -Functional Neurological Disorder With weakness   -Schizoaffective disorder  -Generalized anxiety  -Borderline personality disorder    Medical:   -History of transverse myelitis  -Chronic pain syndrome  -GERD  -Diabetes mellitus type 2  -Obstructive sleep apnea  -Hypothyroidism, on Synthroid  -Increased intracranial pressure      Plan:  Disposition: To be determined by the primary team.  Patient does not meet psychiatric inpatient psychiatric hospitalization.    Medications:  -Continue BuSpar 10 mg twice daily for anxiety  -Continue fluoxetine 40 mg daily for mood  -Continue Trileptal 150 mg twice daily for mood  -Continue trazodone 50 mg nightly for insomnia  -Continue Geodon 40 mg twice daily for psychosis  - Reviewed risks, benefits, alternatives to include no treatment, therapy and medications      -Legal Hold: not applicable       Will Follow  Thank you for  the Consult.

## 2020-02-27 NOTE — PROGRESS NOTES
"CM post discharge call.  Pt reports she contacted Dr. Yousif because of ongoing headache. Reports her Diamox was increased to three times daily.  Pt reports she had a fall earlier today. Paco hitting her head. States \"I twisted my ankles\". Pt states she doesn't feel she needs to go to the ER for evaluation. Reports she will follow-up with PCP tomorrow. Pt verbalized understanding if any worsening symptoms to go ER for evaluation. Update to PCP.  "

## 2020-02-27 NOTE — THERAPY
OT consult received, pt pending neuro consult this am. Will await neuro POC prior to initiating skilled OT evaluation.     Sheeba King MS OTR/L, pager # [phone]

## 2020-02-27 NOTE — PROGRESS NOTES
Daily Progress Note:     Date of Service: 2/27/2020  Primary Team: [facility] White Team   Attending: Kadeem Alvarenga M.D.   Senior Resident: Dr. Abreu  Intern: Dr. Camarillo  Contact:  [phone]    Chief Complaint:   History of fall and injury to back with leg weakness/numbness following    Subjective:  Patient says she is not feeling anything below her knees and not able to move at all, she also feels headache whooshing sound behind her head and sometimes blurry of vision denies any fever, nausea, vomiting,  History of incontinence of the urine.    Consultants/Specialty:  Neurology  Psychiatric    Review of Systems:    Constitutional: Negative for chills no fever  HEENT: Negative for ear pain, negative for hearing loss no sore throat  Eyes: Positive for blurred vision negative for double vision  Respiratory: Negative for cough or shortness of breath  Cardiovascular negative chest pain and palpitations  Gastrointestinal: Negative for abdominal pain, nausea and vomiting  Genitourinary: Negative for dysuria, frequency but positive for incontinence  Musculoskeletal: Positive for back pain and weakness and numbness in both lower extremities below the knees  Skin: Negative for itching and rash  Neurological: Positive for headache, dizziness, numbness, loss of sensation below the knees  Endo/heme/allergies: Does not bleed/bruise easily  Psychiatric/behavioral: Negative for SI/HI    Objective Data: Patient lying down in bed in no acute distress but not moving her both legs    Physical Exam:   Vitals:   Temp:  [36.1 °C (97 °F)-36.4 °C (97.5 °F)] 36.1 °C (97 °F)  Pulse:  [62-72] 72  Resp:  [16-18] 16  BP: (113-148)/(43-94) 113/64  SpO2:  [95 %-99 %] 95 %     Constitutional: Obese female in bed in no acute distress  HEENT: EOMI, PERRLA, normocephalic atraumatic, mucous membrane pink and moist no scleral icterus  Cardiovascular: S1-S2 normal no murmurs  Pulmonary: Air entry good bilaterally no wheezing no  crackles  Gastrointestinal: Soft, nontender, bowel sounds normal no hepatosplenomegaly  Musculoskeletal: Point tenderness at L3, no swelling but both lower extremities below the knee feel cold  Neurological alert oriented, no cranial nerve deficits, normal strength in the upper extremities but decreased in lower extremities.  Decreased reflexes, normal coordination  Decreased sensation below the knee seen in both legs.    Labs:   Recent Labs     02/27/20  0249   WBC 8.7   RBC 4.16*   HEMOGLOBIN 12.9   HEMATOCRIT 38.3   MCV 92.1   MCH 31.0   RDW 48.0   PLATELETCT 170   MPV 11.5   NEUTSPOLYS 66.20   LYMPHOCYTES 21.10*   MONOCYTES 8.40   EOSINOPHILS 3.30   BASOPHILS 0.30     Recent Labs     02/27/20  0220   SODIUM 140   POTASSIUM 3.6   CHLORIDE 111   CO2 18*   GLUCOSE 103*   BUN 16     Recent Labs     02/27/20  0220   ALBUMIN 4.0   TBILIRUBIN 0.4   ALKPHOSPHAT 58   TOTPROTEIN 6.3   ALTSGPT 22   ASTSGOT 10*   CREATININE 0.86     Imaging:   IR-US GUIDED PIV   Final Result    Ultrasound-guided PERIPHERAL IV INSERTION performed by    qualified nursing staff as above.            US-JOSHUA SINGLE LEVEL BILAT   Final Result      DX-ANKLE 3+ VIEWS LEFT   Final Result         1.  No acute traumatic bony injury.         DX-ANKLE 3+ VIEWS RIGHT   Final Result         1.  No acute traumatic bony injury.         CT-HEAD W/O   Final Result         1.  No acute intracranial abnormality.      CT-LSPINE W/O PLUS RECONS   Final Result         1.  No acute traumatic bony injury of the lumbar spine.   2.  Punctate left renal calculus without visualized obstructive changes.          * Leg weakness, bilateral  Assessment & Plan  Following fall also associated with back pain with point tenderness and loss of sensation below knees.   CT spine ruled out any acute pathology   Patient unable to undergo MRI because of Stimulator in place.   Doppler JOSHUA  There is no evidence of major arterial disease demonstrated bilaterally.  neurology consult,  appreciate recommendations  Psychiatric consult  Q4 neuro checks.   Pain control for LBP  PT/OT      Intracranial pressure increased  Assessment & Plan  Has slowly worsening headache with new black spot on right eye.   Patients doctor increased diamox dose to 3 times daily outpatient to monitor till Monday and to further increase if not improving .     Plan :  continue diamox 3 times daily.    TONYA (obstructive sleep apnea)- (present on admission)  Assessment & Plan  Not on CPAP at home. Patient has not followed up with doctor yet for the same.   Follow up outpatient.     DM (diabetes mellitus) (HCC)- (present on admission)  Assessment & Plan  Continue home meds.   DUlaglutide injection every Friday .   Last HBa1C from 08/2019 was 6.0  Diabetic diet.    Asthma- (present on admission)  Assessment & Plan  Continue home meds    Hypothyroidism- (present on admission)  Assessment & Plan  Continue home levothyroxine 75mcg

## 2020-02-27 NOTE — SENIOR ADMIT NOTE
[facility] Senior Admit Note:    Kristin Balderrama is a 29 y.o. female with Hx of optic neuritis on Diamox, possible functional transverse myelitis, diabetes mellitus, asthma, TONYA, hypothyroidism and schizoaffective disorder, who was recently hospitalized for acute flareup of optic neuritis and elevated intracranial pressure and discharged on 2/27/2020 after a lumbar puncture, presented to the ED after a fall at home.  Patient reports that while she was trying to get from her bed to the bedside commode, she felt dizzy and fell on her back and also twisted her ankles. She has severe pain at the site of lumbar puncture, with shooting pain radiating down bilateral buttocks and thighs.  She also reports weakness and numbness in bilateral lower extremities and reports bladder incontinence soon after. Of note, she has a bowel/bladder stimulator placed in 2016. She also complains of increased headache, for which she spoke with her neurologist  who asked the patient to increase her dose of Diamox to 3 times a day.  During her recent hospitalization, she had a therapeutic lumbar puncture which showed a pressure of 28 mm H2O and 30 mL of CSF was drawn out.  CSF showed no signs of infection and patient was discharged with outpatient neurology follow-up.    In the ER, at the time of presentation all her vitals were stable.   CT scan of the head showed no signs of acute intracranial hemorrhage  CT scan of the spine- showed no fracture  X-ray of bilateral ankles- no fracture  Patient is unable to get MRI due to the bowel/bladder stimulator  Blood work-up-no leukocytosis or electrolyte abnormalities    She received morphine for pain relief and R IM was called for admitting the patient.    Physical Exam:   Vitals:    02/26/20 2307 02/27/20 0105 02/27/20 0247 02/27/20 0400   BP:  128/75 120/68 116/55   Pulse:  68 67 63   Resp:  18     Temp:       TempSrc:       SpO2:  99% 98% 97%   Weight: 118.4 kg (261  "lb)      Height:         Body mass index is 43.43 kg/m².  /55   Pulse 63   Temp 36.4 °C (97.5 °F) (Temporal)   Resp 18   Ht 1.651 m (5' 5\")   Wt 118.4 kg (261 lb)   SpO2 97%   BMI 43.43 kg/m²   O2 therapy: Pulse Oximetry: 97 %, O2 Delivery Device: None - Room Air    General: Obese appearing, not in any acute distress  HEENT: normocephalic, atraumatic, no scleral icterus   Cardiovascular: RRR without m/r/g, no lower extremity edema   Lungs: Clear to auscultation bilaterally, no crackles or wheezes  Abdomen: Soft, nontender to palpation, no guarding   Skin: No rashes or erythema appreciated  Neurologic:   AAO x3, no facial asymmetry.  Point tenderness at L2-L3/L3-L4.  Bilateral upper extremities: 5/5 motor strength and no sensory deficits  Bilateral lower extremities:   - ? SLR bilaterally   - Decreased sensation below bilateral knee joints. NO SADDLE ANESTHESIA   - Bilateral Knee extensors: 3/5   - Ankle dorsiflexors: Right-1/5, left - 3/5.   - Anle plantar flexion: Right 1/5 , left 3/5   - Knee reflex and ankle reflex 1+   - Babinski - negative.   - CLONUS PRESENT    -Rectal tone- normal resting tone, unable to appreciate squeeze pressure  Weak peripheral pulses, cold extremities  Capillary refill - around 4 seconds      Assessment and Plan:      #Dizziness - ?Postural hypotension secondary to recent lumbar puncture, orthostats pending; recent CTA neck showed < 50% occlusion of the carotids, echocardiogram (11/2019) -showed normal EF and no valvular pathology    #Bilateral lower extremity weakness and sensory changes  #Functional transverse myelitis  #?Spinal shock   -CT head and spine: Showed no bony pathology  -Red flags: No loss of bladder control soon after the fall and weakness in bilateral lower extremities, loss of sensation below her knees.  - Knee and ankle reflexes present  -No saddle anesthesia  -SLR positive bilaterally  -Patient is not a candidate for MRI given bowel/bladder stimulator not " MRI compatible  -Frequent neuro checks  -Fall precautions  -Oxycodone 5 mg every 4 hours as needed for back pain  -Consult neurology    #Cold lower extremities below the knee   - feeble pulses and delayed capillary refill (4 seconds)  - ordered arterial doppler     Chronic problems:  #Diabetes mellitus  #Hypothyroidism  #Schizoaffective disorder      Jackson Stewart M.D.   Please refer to Intern Dr. Wya's H&P for complete admission details.

## 2020-02-27 NOTE — ASSESSMENT & PLAN NOTE
Likely orthostatic from dehydration or increased dose of diamox or vertigo with ongoing ear issues.   Echo from 11/8/2019 showed an EF of  55%.   Indeterminate diastolic function. Right ventricular systolic pressure is 25 mmHg.  Ct angiogram from 02/24 within normal limits.  CT head on this admission negative.     Plan :  Orthostatic vitals   Cardiac monitoring.   May consider repeat Echo if orthostatics negative or if dizziness persists.

## 2020-02-27 NOTE — THERAPY
Physical Therapy: Eval attempted, awaiting neuro consult in am, then psyche in with patient when attempted in pm. Will complete eval when pt available. -Lexy Echols, PT

## 2020-02-27 NOTE — PROGRESS NOTES
0445: Patient arrived to unit with transport. Patient A&Ox4, c/o pain to lower back and head, medicated per MAR. Patient c/o numbness and weakness to BLE. Patient passed dysphagia screening, on diabetic diet. Bed alarm on, call light within reach, hourly rounding in place.

## 2020-02-27 NOTE — H&P
History & Physical Note    Date of Admission: 2/27/2020  Admission Status: Observation-Outpatient  [facility] Team: [facility] White Team  Attending: Lyle Quan MD  Senior Resident: Dr. Abreu  Intern: Dr. Camarillo  Contact Number: [phone]    Chief Complaint: Bilateral leg numbness following fall.      History of Present Illness (HPI): Kristin is a 29 y.o. female who presented 2/27/2020 with PMH of :-  - PMH of hypothyroidism   - DM2  - h/o optic neuritis  -chronic hip and back pain  -schizophrenia  -h/o ablation for SVT on ivadarabine,  -asthma  (multiple admissions in the past for exacerbation)    - obesity  -TONYA ( nor on CPAP)  -right foot drop  -?Stimulator in place for bowel, bladder incontinence   -h/o concussion in Nov/December  -h/o hemiplegia migraines  -h/o of headache resolved with lumbar puncture now on diamox  -Multiple psychiatric issues.    Who presented after a ground level fall. Patient states that she felt lightheaded or dizzy, described also as room spinning when she was trying to transfer from  to the commode. She tried to not fall, in the process of which both her ankles twisted and she fell, hitting her back on the knob of her dresser as well as the back of her head on another knob of the same dresser. This happened around 3 pm today. Later around 8 pm she said her legs went numb bilaterally. Patient has pain at the site if the LP and slightly above it with point tenderness .    Denied any chest pain, shortness of breath, palpitations, loss of consciousness associated with the dizziness.   States that she passed urine immediately after the fall, which she said was unusual. She is sometimes incontinent to urine, but this happened faster than usual.      Patient was recently admitted for worsening headache and facial pain that resolved with IV steroids and therapeutic lumbar puncture that showed and opening pressure of 28 mm with removal of about 30 ml CSF and was discharged with outpatient follow  up with Neurology and PCP and increase in Diamox dose.     In the ED, the patients vitals were wnl except for Bicarbonate of 18 which is chronic,   Labs wnl    CT L spine showed no acute traumatic bony injury of the lumbar spine.  and a  punctate left renal calculus without visualized obstructive changes.  CT head showed no Acute intracranial abnormality.  Xray ankle three views showed no fractures.        Review of Systems:Review of Systems   Constitutional: Negative for chills, diaphoresis, fever and weight loss.   HENT: Positive for ear pain (mild earpain -). Negative for congestion, ear discharge, hearing loss, sinus pain and sore throat.    Eyes: Negative for blurred vision, double vision, pain, discharge and redness.        Black spot on right eye.    Respiratory: Negative for cough, sputum production, shortness of breath, wheezing and stridor.    Cardiovascular: Negative for chest pain, palpitations, orthopnea, leg swelling and PND.   Gastrointestinal: Positive for nausea and vomiting (One episode yesterday form pain.). Negative for abdominal pain, blood in stool and melena.   Genitourinary: Negative for dysuria, frequency, hematuria and urgency.        States that she is generally incontinent to urine, however after the fall she had immediate urine incontinence which she said is unusual as it happened too fast.   Musculoskeletal: Positive for back pain (At the site of the LP and slightly above it as well. ).   Skin: Negative for itching and rash.   Neurological: Positive for dizziness, sensory change, weakness and headaches. Negative for tingling, tremors, speech change, focal weakness, seizures and loss of consciousness.   Endo/Heme/Allergies: Does not bruise/bleed easily.   Psychiatric/Behavioral: Negative for depression.       Past Medical History:   Past Medical History was reviewed with patient.   has a past medical history of Abdominal pain, Anginal syndrome, Apnea, sleep, Arrhythmia, Arthritis,  ASTHMA, Atrial fibrillation (HCC), Back pain, Borderline personality disorder ([facility]), Breath shortness, Bronchitis, Cardiac arrhythmia, Chickenpox, Chronic UTI (9/18/2010), Cough, Daytime sleepiness, Depression, Diabetes ([facility]), Diarrhea, Disorder of thyroid, Fall, Fatigue, Frequent headaches, Gasping for breath, Gynecological disorder, Headache(784.0), Heart burn, History of falling, Hypertension, Indigestion, Migraine, 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[facility]), 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    Past Surgical History: Past Surgical History was reviewed with patient.   has a past surgical history that includes neuro dest facet l/s w/ig sngl (4/21/2015); recovery (1/27/2016); delmar by laparoscopy (8/29/2010); lumbar fusion anterior (8/21/2012); other cardiac surgery (1/2016); tonsillectomy; bowel stimulator insertion (7/13/2016); gastroscopy with balloon dilatation (N/A, 1/4/2017); muscle biopsy (Right, 1/26/2017); other abdominal surgery; and laminotomy.    Medications: Medications have been reviewed with patient.  Prior to Admission Medications   Prescriptions Last Dose Informant Patient Reported? Taking?   Diclofenac Sodium (VOLTAREN) 1 % Gel  Patient Yes No   Sig: Apply 1 Application to skin as directed 4 times a day as needed (on wrists, back, ankles, and feet).   Dulaglutide (TRULICITY) 1.5 MG/0.5ML Solution Pen-injector  Patient Yes No   Sig: Inject 1.5 mg as instructed every Friday.   Ivabradine HCl (CORLANOR) 7.5 MG Tab  Patient Yes No   Sig: Take 7.5 mg by mouth 2 Times a Day.   Melatonin 10 MG Tab  Patient Yes No   Sig: Take 10 mg by mouth every evening.   OXcarbazepine  (TRILEPTAL) 150 MG Tab  Patient No No   Sig: Take 1 Tab by mouth 2 Times a Day.   acetaZOLAMIDE SR (DIAMOX) 500 MG CAPSULE SR 12 HR   No No   Sig: Take 1 Cap by mouth 2 times a day.   albuterol 108 (90 Base) MCG/ACT Aero Soln inhalation aerosol  Patient Yes No   Sig: Inhale 2 Puffs by mouth every 6 hours as needed for Shortness of Breath.   aspirin EC (ECOTRIN) 81 MG Tablet Delayed Response  Patient Yes No   Sig: Take 81 mg by mouth every day.   azithromycin (ZITHROMAX) 250 MG Tab  Patient Yes No   Sig: Take 250-500 mg by mouth See Admin Instructions. Z-Ilya   budesonide-formoterol (SYMBICORT) 160-4.5 MCG/ACT Aerosol  Patient Yes No   Sig: Inhale 2 Puffs by mouth 2 Times a Day.   busPIRone (BUSPAR) 10 MG Tab tablet  Patient No No   Sig: TAKE ONE TABLET BY MOUTH TWICE DAILY   etonogestrel (NEXPLANON) 68 MG Implant implant  Patient Yes No   Sig: Inject 1 Each as instructed Once.   fluoxetine (PROZAC) 40 MG capsule   No No   Sig: TAKE ONE CAPSULE BY MOUTH ONCE A DAY   furosemide (LASIX) 80 MG Tab  Patient Yes No   Sig: Take 80 mg by mouth 1 time daily as needed.   ipratropium-albuterol (DUONEB) 0.5-2.5 (3) MG/3ML nebulizer solution  Patient No No   Sig: 3 mL by Nebulization route every 6 hours as needed for Shortness of Breath.   levothyroxine (SYNTHROID) 75 MCG Tab  Patient Yes No   Sig: Take 75 mcg by mouth Every morning on an empty stomach.   mycophenolate (CELLCEPT) 500 MG tablet  Patient Yes No   Sig: Take 1,000 mg by mouth 2 times a day.   ondansetron (ZOFRAN ODT) 4 MG TABLET DISPERSIBLE  Patient Yes No   Sig: Take 4 mg by mouth every 6 hours as needed for Nausea.   potassium chloride SA (KDUR) 20 MEQ Tab CR  Patient Yes No   Sig: Take 20 mEq by mouth 2 times a day.   predniSONE 5 MG Tablet Delayed Response  Patient Yes No   Sig: Take 7.5 mg by mouth every day.   pregabalin (LYRICA) 300 MG capsule  Patient Yes No   Sig: Take 300 mg by mouth 2 times a day.   promethazine (PHENERGAN) 25 MG Suppos  Patient No No  "  Sig: Insert 1 Suppository in rectum every 6 hours as needed for Nausea/Vomiting.   senna-docusate (PERICOLACE OR SENOKOT S) 8.6-50 MG Tab  Patient No No   Sig: Take 2 Tabs by mouth 2 times a day as needed for Constipation.   tiotropium (SPIRIVA) 18 MCG Cap  Patient No No   Sig: Inhale 1 Cap by mouth every day.   traZODone (DESYREL) 100 MG Tab  Patient No No   Sig: Take 0.5 Tabs by mouth every evening.   ziprasidone (GEODON) 40 MG Cap  Patient No No   Sig: TAKE ONE CAPSULE BY MOUTH TWICE DAILY      Facility-Administered Medications: None        Allergies: Allergies have been reviewed with patient.  Allergies   Allergen Reactions   • Cefdinir Shortness of Breath and Itching     Tolerated 1/18/17  Tolerates ceftriaxone  Tolerated augmentin 8/2019    • Depakote [Divalproex Sodium] Unspecified     Muscle spasms/muscle pain and weakness     • Doxycycline Anaphylaxis and Vomiting     RXN=unknown   • Amitriptyline Unspecified     Headaches     • Aripiprazole [Abilify] Unspecified     Headaches/muscle twitching     • Clindamycin Nausea     Even with food     • Flagyl [Metronidazole Hcl] Unspecified     \"eye problems\"     • Flomax [Tamsulosin Hydrochloride] Swelling   • Levaquin Unspecified     Severe muscle cramps in legs  RXN=unknown   • Metformin Unspecified     Increased lactic acid      • Tape Rash     Tears skin off  coban with Tegaderm tape ok intermittently  RXN=ongoing   • Vancomycin Itching     Pt becomes flushed in face and chest.   RXN=7/10/16   • Wound Dressing Adhesive Hives     By pt report   • Ciprofloxacin    • Depakote  [Divalproex Sodium]    • Hydromorphone Hcl    • Kdc:Metronidazole+Tartrazine    • Keflex Rash     Pt states she gets a rash with this medication  Tolerates ceftriaxone   • Levofloxacin Unspecified     Leg muscle cramps   • Penicillins        Family History:   family history includes Genitourinary () Problems in her sister; Heart Disease in her maternal grandmother and mother; Hypertension " in her maternal grandmother, maternal uncle, and mother; No Known Problems in her sister; Other in her mother and sister; Sleep Apnea in her mother.     Social History:   Tobacco: None  Alcohol: None   Recreational drugs (illegal and prescription):  None   Employment: -  Activity Level: Wheelchair   Living situation:  With grandparents  Recent travel:  None  Primary Care Provider: reviewed Torres Brody M.D.  Other (stressors, spirituality, exposures):  NA  Physical Exam:   Vitals:  Temp:  [36.1 °C (97 °F)-36.4 °C (97.5 °F)] 36.1 °C (97 °F)  Pulse:  [62-70] 62  Resp:  [16-18] 16  BP: (116-148)/(55-94) 122/67  SpO2:  [97 %-99 %] 97 %    Physical Exam  Constitutional:       General: She is not in acute distress (Complains of back pain. ).  HENT:      Head: Normocephalic and atraumatic.      Right Ear: External ear normal.      Left Ear: External ear normal.      Nose: Nose normal. No congestion or rhinorrhea.      Mouth/Throat:      Mouth: Mucous membranes are moist.      Pharynx: No oropharyngeal exudate or posterior oropharyngeal erythema.   Eyes:      General: No scleral icterus.        Right eye: No discharge.         Left eye: No discharge.      Extraocular Movements: Extraocular movements intact.      Conjunctiva/sclera: Conjunctivae normal.      Pupils: Pupils are equal, round, and reactive to light.   Neck:      Musculoskeletal: Normal range of motion and neck supple.   Cardiovascular:      Rate and Rhythm: Normal rate and regular rhythm.      Heart sounds: Normal heart sounds. No murmur.      Comments: Difficult to obtain bilateral pedal pulses.   Cold Bilateral lower extremities knee down.    Pulmonary:      Effort: Pulmonary effort is normal. No respiratory distress.      Breath sounds: Normal breath sounds. No stridor. No wheezing, rhonchi or rales.   Abdominal:      General: Bowel sounds are normal. There is no distension.      Palpations: Abdomen is soft.   Musculoskeletal:         General: No swelling  or tenderness.      Comments: Point tenderness on lower spine.    Skin:     General: Skin is dry.      Coloration: Skin is not jaundiced.      Findings: Bruising (at sight of LP.) present.      Comments: Cold bilateral lower extremities knee down.   Slow capillary reflex.    Neurological:      Mental Status: She is alert and oriented to person, place, and time.      Cranial Nerves: No cranial nerve deficit.      Sensory: Sensory deficit present.      Motor: Weakness present.      Coordination: Coordination normal.      Deep Tendon Reflexes: Reflexes normal.      Comments: Patient has 2/5 weakness of bilateral lower extremities.   Loss of sensation below knee on bilateral lower extremities.   Rectal tone : resting score : normal , but low squeeze score, not sure if this is baseline.  Straight leg test showed that she had radiating pain to her buttocks. Since she does not have sensation below knee- this could be a positive straight leg.   No saddle anaesthesia.   Positive bilateral clonus.   Babinski could not  be elicited.   Positive ankle relflexes.   Psychiatric:         Mood and Affect: Mood normal.         Labs:   Results for orders placed or performed during the hospital encounter of 02/27/20   CMP   Result Value Ref Range    Sodium 140 135 - 145 mmol/L    Potassium 3.6 3.6 - 5.5 mmol/L    Chloride 111 96 - 112 mmol/L    Co2 18 (L) 20 - 33 mmol/L    Anion Gap 11.0 0.0 - 11.9    Glucose 103 (H) 65 - 99 mg/dL    Bun 16 8 - 22 mg/dL    Creatinine 0.86 0.50 - 1.40 mg/dL    Calcium 9.1 8.5 - 10.5 mg/dL    AST(SGOT) 10 (L) 12 - 45 U/L    ALT(SGPT) 22 2 - 50 U/L    Alkaline Phosphatase 58 30 - 99 U/L    Total Bilirubin 0.4 0.1 - 1.5 mg/dL    Albumin 4.0 3.2 - 4.9 g/dL    Total Protein 6.3 6.0 - 8.2 g/dL    Globulin 2.3 1.9 - 3.5 g/dL    A-G Ratio 1.7 g/dL   PTT   Result Value Ref Range    APTT 23.4 (L) 24.7 - 36.0 sec   PT/INR   Result Value Ref Range    PT 13.1 12.0 - 14.6 sec    INR 0.97 0.87 - 1.13   CBC WITH  DIFFERENTIAL   Result Value Ref Range    WBC 8.7 4.8 - 10.8 K/uL    RBC 4.16 (L) 4.20 - 5.40 M/uL    Hemoglobin 12.9 12.0 - 16.0 g/dL    Hematocrit 38.3 37.0 - 47.0 %    MCV 92.1 81.4 - 97.8 fL    MCH 31.0 27.0 - 33.0 pg    MCHC 33.7 33.6 - 35.0 g/dL    RDW 48.0 35.9 - 50.0 fL    Platelet Count 170 164 - 446 K/uL    MPV 11.5 9.0 - 12.9 fL    Neutrophils-Polys 66.20 44.00 - 72.00 %    Lymphocytes 21.10 (L) 22.00 - 41.00 %    Monocytes 8.40 0.00 - 13.40 %    Eosinophils 3.30 0.00 - 6.90 %    Basophils 0.30 0.00 - 1.80 %    Immature Granulocytes 0.70 0.00 - 0.90 %    Nucleated RBC 0.00 /100 WBC    Neutrophils (Absolute) 5.75 2.00 - 7.15 K/uL    Lymphs (Absolute) 1.84 1.00 - 4.80 K/uL    Monos (Absolute) 0.73 0.00 - 0.85 K/uL    Eos (Absolute) 0.29 0.00 - 0.51 K/uL    Baso (Absolute) 0.03 0.00 - 0.12 K/uL    Immature Granulocytes (abs) 0.06 0.00 - 0.11 K/uL    NRBC (Absolute) 0.00 K/uL   ESTIMATED GFR   Result Value Ref Range    GFR If African American >60 >60 mL/min/1.73 m 2    GFR If Non African American >60 >60 mL/min/1.73 m 2     *Note: Due to a large number of results and/or encounters for the requested time period, some results have not been displayed. A complete set of results can be found in Results Review.       EKG: None    Imaging:   US-JOSHUA SINGLE LEVEL BILAT         DX-ANKLE 3+ VIEWS LEFT   Final Result         1.  No acute traumatic bony injury.         DX-ANKLE 3+ VIEWS RIGHT   Final Result         1.  No acute traumatic bony injury.         CT-HEAD W/O   Final Result         1.  No acute intracranial abnormality.      CT-LSPINE W/O PLUS RECONS   Final Result         1.  No acute traumatic bony injury of the lumbar spine.   2.  Punctate left renal calculus without visualized obstructive changes.          Previous Data Review: reviewed    * Leg weakness, bilateral  Assessment & Plan  Following fall also associated with back pain with point tenderness and loss of sensation below knees.   CT spine ruled out  any acute pathology   Patient unable to undergo MRI because of Stimulator in place.   Ultrasound doppler done by nurse was able to  pulses on lower extremity.  Differentials include herniated disc( may have positive straight leg as pain radiates down buttocks , but she also does not have sensation below knee) , transient paralysis/spinal shock from injury, arterial occlusion( as extremities are cold with slow capillary reflex and difficult to obtain pulses bilaterally).  Reflexes present but with clonus, but patient also has hypothyroidism and no saddle anaesthesia.       Plan :  Neurology/Neurosurgery consult.  Arterial doppler to rule out vessel occlusion   Q4 neuro checks.   Pain control for LBP      Dizziness  Assessment & Plan  Likely orthostatic from dehydration or increased dose of diamox or vertigo with ongoing ear issues.   Echo from 11/8/2019 showed an EF of  55%.   Indeterminate diastolic function. Right ventricular systolic pressure is 25 mmHg.  Ct angiogram from 02/24 within normal limits.  CT head on this admission negative.     Plan :  Orthostatic vitals   Cardiac monitoring.   May consider repeat Echo if orthostatics negative or if dizziness persists.         Intracranial pressure increased  Assessment & Plan  Has slowly worsening headache with new black spot on right eye. Patients doctor increased diamox dose to 3 times daily outpatient to monitor till Monday and to further increase if not improving .     Plan :  Will continue diamox 3 times daily   May consider steroids if needed.      TONYA (obstructive sleep apnea)- (present on admission)  Assessment & Plan  Not on CPAP at home. Patient has not followed up with doctor yet for the same.   Follow up outpatient.     DM (diabetes mellitus) (HCC)- (present on admission)  Assessment & Plan  Continue home meds.   DUlaglutide injection every Friday .   Last HBa1C from 08/2019 was 6.0  Diabetic diet.    Asthma- (present on admission)  Assessment &  Plan  Continue home meds    Hypothyroidism- (present on admission)  Assessment & Plan  Continue home levothyroxine 75mcg

## 2020-02-27 NOTE — DIETARY
NUTRITION SERVICES:   BMI - Pt with BMI >40 (=Body mass index is 43.43 kg/m².), morbid obesity. Weight source is stated per chart review. Weight loss counseling not appropriate in acute care setting.   MST 1- Poor po and/or weight loss reported on admission. Malnutrition Screening Tool score <2.   RECOMMEND - Referral to outpatient nutrition services for weight management after D/C. Nutrition assessment not indicated at this time. RD will monitor per department guidelines. Please consult RD for nutrition concerns.

## 2020-02-27 NOTE — ASSESSMENT & PLAN NOTE
Continue home meds  Start montelukast and glycopyrronium bromide as per RT rec  Saturating well on room air.

## 2020-02-27 NOTE — ASSESSMENT & PLAN NOTE
-Patient caregiver states she is unable to provide safe transfers between chair and bed  -PT states there is no more equipment they can provide to assist with it  -It is likely that patient will require long term care facility placement  -CT spine ruled out any acute pathology   -Patient unable to undergo MRI because of Stimulator in place.     3/23  #Neurology  Recommendation:  -Continues IV Solu-Medrol 1 g daily for 4 days - 3/21-3/24 plus prednisone taper starting 03/25/20  -Aggressive PT/OT-= consult placed  -Q4 neuro checks.   -Follow outpatient neurology   -Appreciate neurology recommendations.    #Neuropthalmology recommendation: continue med :   -Continue  Diamox  1000 mg p.o. b.i.d.  -Maintain the CellCept 1000 mg p.o. b.i.d.  -Follow up outpatient in neurophthalmology clinic.    #Psychiatric consult, continue psychiatric medication    -Pending placement

## 2020-02-27 NOTE — ED PROVIDER NOTES
ED Provider Note    Scribed for Jimmy Abbott M.D. by Brock Driver. 2/27/2020  12:18 AM    Primary care provider: Torres Brody M.D.  Means of arrival: Wheel chair   History obtained from: Patient   History limited by: None     CHIEF COMPLAINT  Chief Complaint   Patient presents with   • Back Pain   • Ankle Pain     bilateral ankle pain after fall       HPI  Kristin Balderrama is a 29 y.o. female who presents to the Emergency Department for evaluation of bilateral ankle pain onset today. Patient reports she twisted her ankles after ground level fall. She adds that she struck her back and head on the dresser knob as well. Patient adds that she had a lumbar puncture 2 day ago with no complications. She states that when she struck her lumbar site, she immediately lost control of her bladder. She rates her back pain as 9/10 in severity and ankle pain as 8/10 in severity. Presents associated symptoms of numbness of bilateral legs, increased pressure like headache, and nausea. She rates her headache as 8/10 in severity.  Denies emesis. Patient states that she has been wheelchair bound for the last month and cannot walk. Denies history of bilateral leg numbness since using wheelchair. Patient has uses Motrin, Ibuprofen, and Acetaminophen for alleviation. Denies any use of anticoagulants. History of depression, diabetes, urinary incontinence.     Hospitalized 02/23/202020 for intercranial pressure, Lumbar puncture was performed 02/24/2020, and was discharged 02/25/2020 and     REVIEW OF SYSTEMS  Pertinent positives include: bilateral ankle pain, lower back pain, numbness of bilateral legs, urinary incontinence, increased pressure like headache, nausea.  Pertinent negatives include: emesis. See history of present illness. All other systems are negative.     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    SURGICAL HISTORY   has a past surgical history that includes neuro dest facet l/s w/ig sngl (4/21/2015); recovery (1/27/2016); [place] by laparoscopy (8/29/2010); lumbar fusion anterior (8/21/2012); other cardiac surgery (1/2016); tonsillectomy; bowel stimulator insertion (7/13/2016); gastroscopy with balloon dilatation (N/A, 1/4/2017); muscle biopsy (Right, 1/26/2017); other abdominal surgery; and laminotomy.    SOCIAL HISTORY  Social History     Tobacco Use   • Smoking status: Never Smoker   • Smokeless tobacco: Never Used   Substance Use Topics   • Alcohol use: No     Alcohol/week: 0.0 oz   • Drug use: Not Currently     Frequency: 7.0 times per week     Types: Marijuana, Oral     Comment: Medicinal edible's      Social History     Substance and Sexual Activity   Drug Use Not Currently   • Frequency: 7.0 times per week   • Types: Marijuana, Oral    Comment: Medicinal edible's       FAMILY HISTORY  Family History   Problem Relation Age of Onset   • Hypertension Mother    • Sleep Apnea Mother    • Heart Disease Mother    • Other Mother         hypothryod   • Hypertension Maternal Uncle    • Heart Disease Maternal Grandmother    •  Hypertension Maternal Grandmother    • No Known Problems Sister    • Other Sister         Narcolepsy;fibromyalsia;bone;nerve   • Genitourinary () Problems Sister         endometriosis       CURRENT MEDICATIONS  No current facility-administered medications on file prior to encounter.      Current Outpatient Medications on File Prior to Encounter   Medication Sig Dispense Refill   • acetaZOLAMIDE SR (DIAMOX) 500 MG CAPSULE SR 12 HR Take 1 Cap by mouth 2 times a day. 60 Cap 1   • fluoxetine (PROZAC) 40 MG capsule TAKE ONE CAPSULE BY MOUTH ONCE A DAY 30 Cap 0   • pregabalin (LYRICA) 300 MG capsule Take 300 mg by mouth 2 times a day.     • azithromycin (ZITHROMAX) 250 MG Tab Take 250-500 mg by mouth See Admin Instructions. Z-Ilya     • ziprasidone (GEODON) 40 MG Cap TAKE ONE CAPSULE BY MOUTH TWICE DAILY 60 Cap 0   • busPIRone (BUSPAR) 10 MG Tab tablet TAKE ONE TABLET BY MOUTH TWICE DAILY 60 Tab 0   • OXcarbazepine (TRILEPTAL) 150 MG Tab Take 1 Tab by mouth 2 Times a Day. 60 Tab 2   • promethazine (PHENERGAN) 25 MG Suppos Insert 1 Suppository in rectum every 6 hours as needed for Nausea/Vomiting. 10 Suppository 0   • traZODone (DESYREL) 100 MG Tab Take 0.5 Tabs by mouth every evening. 30 Tab 3   • senna-docusate (PERICOLACE OR SENOKOT S) 8.6-50 MG Tab Take 2 Tabs by mouth 2 times a day as needed for Constipation.     • predniSONE 5 MG Tablet Delayed Response Take 7.5 mg by mouth every day.     • budesonide-formoterol (SYMBICORT) 160-4.5 MCG/ACT Aerosol Inhale 2 Puffs by mouth 2 Times a Day.     • aspirin EC (ECOTRIN) 81 MG Tablet Delayed Response Take 81 mg by mouth every day.     • levothyroxine (SYNTHROID) 75 MCG Tab Take 75 mcg by mouth Every morning on an empty stomach.     • ondansetron (ZOFRAN ODT) 4 MG TABLET DISPERSIBLE Take 4 mg by mouth every 6 hours as needed for Nausea.     • potassium chloride SA (KDUR) 20 MEQ Tab CR Take 20 mEq by mouth 2 times a day.     • Diclofenac Sodium (VOLTAREN) 1 % Gel Apply 1  "Application to skin as directed 4 times a day as needed (on wrists, back, ankles, and feet).     • tiotropium (SPIRIVA) 18 MCG Cap Inhale 1 Cap by mouth every day. 90 Cap 3   • Ivabradine HCl (CORLANOR) 7.5 MG Tab Take 7.5 mg by mouth 2 Times a Day.     • ipratropium-albuterol (DUONEB) 0.5-2.5 (3) MG/3ML nebulizer solution 3 mL by Nebulization route every 6 hours as needed for Shortness of Breath. 60 Bullet 1   • etonogestrel (NEXPLANON) 68 MG Implant implant Inject 1 Each as instructed Once.     • mycophenolate (CELLCEPT) 500 MG tablet Take 1,000 mg by mouth 2 times a day.     • Dulaglutide (TRULICITY) 1.5 MG/0.5ML Solution Pen-injector Inject 1.5 mg as instructed every Friday.     • albuterol 108 (90 Base) MCG/ACT Aero Soln inhalation aerosol Inhale 2 Puffs by mouth every 6 hours as needed for Shortness of Breath.     • Melatonin 10 MG Tab Take 10 mg by mouth every evening.     • furosemide (LASIX) 80 MG Tab Take 80 mg by mouth 1 time daily as needed.          ALLERGIES  Allergies   Allergen Reactions   • Cefdinir Shortness of Breath and Itching     Tolerated 1/18/17  Tolerates ceftriaxone  Tolerated augmentin 8/2019    • Depakote [Divalproex Sodium] Unspecified     Muscle spasms/muscle pain and weakness     • Doxycycline Anaphylaxis and Vomiting     RXN=unknown   • Amitriptyline Unspecified     Headaches     • Aripiprazole [Abilify] Unspecified     Headaches/muscle twitching     • Clindamycin Nausea     Even with food     • Flagyl [Metronidazole Hcl] Unspecified     \"eye problems\"     • Flomax [Tamsulosin Hydrochloride] Swelling   • Levaquin Unspecified     Severe muscle cramps in legs  RXN=unknown   • Metformin Unspecified     Increased lactic acid      • Tape Rash     Tears skin off  coban with Tegaderm tape ok intermittently  RXN=ongoing   • Vancomycin Itching     Pt becomes flushed in face and chest.   RXN=7/10/16   • Wound Dressing Adhesive Hives     By pt report   • Ciprofloxacin    • Depakote  [Divalproex " "Sodium]    • Hydromorphone Hcl    • Kdc:Metronidazole+Tartrazine    • Keflex Rash     Pt states she gets a rash with this medication  Tolerates ceftriaxone   • Levofloxacin Unspecified     Leg muscle cramps   • Penicillins        PHYSICAL EXAM  VITAL SIGNS: /94   Pulse 70   Temp 36.4 °C (97.5 °F) (Temporal)   Resp 16   Ht 1.651 m (5' 5\")   Wt 118.4 kg (261 lb)   SpO2 99%   BMI 43.43 kg/m²     Constitutional: Alert in no apparent distress.  HENT: No signs of trauma, Bilateral external ears normal, Nose normal. Uvula midline.   Eyes: Pupils are equal and reactive, Conjunctiva normal, Non-icteric.   Neck: Normal range of motion, No tenderness, Supple, No stridor.   Cardiovascular: Regular rate and rhythm, no murmurs.   Thorax & Lungs: Normal breath sounds, No respiratory distress, No wheezing, No chest tenderness.   Abdomen:  Soft, No tenderness, No peritoneal signs, No masses, No pulsatile masses.   Skin: Warm, Dry, No erythema, No rash.   Back: Tenderness to palpation of the mid-lumbar spine.   Extremities: 3/5 strength in bilateral lower extremity. Decreased sensation in bilateral lower extremities.   Musculoskeletal: Good range of motion in all major joints. No tenderness to palpation or major deformities noted.   Neurologic: Cranial nerves II through XII intact.  5 out of 5 strength x4.  Sensation intact light touch.  Normal finger-nose-finger.  Normal reflexes bilaterally.  No clonus. EOMI. PERRL.    Psychiatric: Affect normal, Judgment normal, Mood normal.       DIAGNOSTIC STUDIES / PROCEDURES    LABS  Labs Reviewed   COMP METABOLIC PANEL - Abnormal; Notable for the following components:       Result Value    Co2 18 (*)     Glucose 103 (*)     AST(SGOT) 10 (*)     All other components within normal limits    Narrative:     Indicate which anticoagulants the patient is on:->NONE   APTT - Abnormal; Notable for the following components:    APTT 23.4 (*)     All other components within normal limits    " Narrative:     Indicate which anticoagulants the patient is on:->NONE   CBC WITH DIFFERENTIAL - Abnormal; Notable for the following components:    RBC 4.16 (*)     Lymphocytes 21.10 (*)     All other components within normal limits    Narrative:     SPECIMEN IS A RECOLLECT   PROTHROMBIN TIME    Narrative:     Indicate which anticoagulants the patient is on:->NONE   ESTIMATED GFR    Narrative:     Indicate which anticoagulants the patient is on:->NONE      All labs reviewed by me.    RADIOLOGY  DX-ANKLE 3+ VIEWS LEFT   Final Result         1.  No acute traumatic bony injury.         DX-ANKLE 3+ VIEWS RIGHT   Final Result         1.  No acute traumatic bony injury.         CT-HEAD W/O   Final Result         1.  No acute intracranial abnormality.      CT-LSPINE W/O PLUS RECONS   Final Result         1.  No acute traumatic bony injury of the lumbar spine.   2.  Punctate left renal calculus without visualized obstructive changes.        The radiologist's interpretation of all radiological studies have been reviewed by me.      IV Placement Procedure Note: (with ultrasound guidance)   The patient was consented all risks benefits and alternatives were discussed.   LOCATION: Left forearm  POSITION: trendelenburg.   PROCEDURE NOTE: Indication, poor access. And multiple US guided attempts by nursing staff.  Sterile prep, gown, and drape protocols were used. Using ultrasound guidance, IV was placed with ultrasound guidance successfully by cannulating the AC vein with ultrasound guidance. We used a 22-gauge IV and had good flash and was able to cannulate fully and normal saline flowed easily. There is no localized infiltration. The line was secured by nursing staff.      COURSE & MEDICAL DECISION MAKING  Nursing notes, VS, PMSFHx reviewed in chart.    29 y.o. female p/w chief complaint of bilateral ankle pain onset today.    12:18 AM Patient seen and examined at bedside.  Patient will be treated with Morphine 4 mg, Zofran 4 mg,  and NS infusion 1,000 mL. Ordered DX-ankle bilateral, CT-head, CT-LSpine, CBC with diff., CMP, PTT, PT/INR for further evaluation.  Informed patient about lab work and radiology to rule out possible diagnoses. Patient understands and agrees with plan of care.     Intravenous fluids administered for critical condition of patient.  Patient not appropriate for oral rehydration, as surgical process needs to be ruled out before trial of oral rehydration.   On repeat evaluation, pt w/ stable sx.  Pt w/ positive fluid response.     The differential diagnoses include but are not limited to: CT LSpine to rule out fracture.  No fracture visualized.  CT head obtained by me to rule out ICH given close head injury and new numbness.  No evidence of ICH or skull fracture.  Given new numbness and recent injury plan to admit to medicine for potential physical therapy and potential neurology consult in a.m.  X-ray of ankles unremarkable and no evidence of fracture.  Patient reports inability to obtain MRI due to prior surgical implant  Would consider neurosurg consult in AM if sx persist    2:03 AM IV is not able to be placed after numerous attempts on bilateral arms. I was called to bedside to attempt IV placement. Infomred patient that if IV is not able to be placed, we would have to attempt a central line. Patient understands and agrees with plan of care.     2:23 AM IV now successfully in place.     2:31 AM Paged hospitalist    2:43 AM - I discussed the patient's case and the above findings with [facility] who agrees to consult with patient for further evaluation.     DISPOSITION   Patient will be hospitalized to [facility] in guarded condition.      FINAL IMPRESSION  1. Acute midline low back pain, unspecified whether sciatica present    2. Fall, initial encounter    3. Leg numbness          IBrock (Scribe), am scribing for, and in the presence of, Jimmy Abbott M.D..    Electronically signed by: Brock Driver  (Scribe), 2/27/2020    IJimmy M.D. personally performed the services described in this documentation, as scribed by Brock Driver in my presence, and it is both accurate and complete.    C    The note accurately reflects work and decisions made by me.  Jimmy Abbott M.D.  2/27/2020  4:24 AM

## 2020-02-27 NOTE — ASSESSMENT & PLAN NOTE
-Clinically improving.  -Patient was previously on Lasix started at dose of 20mg in 2017 for bilateral lower extremity edema, and has been on lasix for several years. Her lasix 80mg daily was discontinued upon discharge from hospital 02/25/2020.   -Patient was started on Diamox / filled diamox 02/27/20 outpatient , dose increased while patient was inpatient during this hospitalization to 1000mg BID per neurology    Plan :  Continue 1000mg BID Diamox

## 2020-02-28 ENCOUNTER — APPOINTMENT (OUTPATIENT)
Dept: RADIOLOGY | Facility: MEDICAL CENTER | Age: 30
DRG: 880 | End: 2020-02-28
Attending: STUDENT IN AN ORGANIZED HEALTH CARE EDUCATION/TRAINING PROGRAM
Payer: MEDICARE

## 2020-02-28 LAB
ALBUMIN SERPL BCP-MCNC: 3.3 G/DL (ref 3.2–4.9)
ALBUMIN/GLOB SERPL: 1.9 G/DL
ALP SERPL-CCNC: 56 U/L (ref 30–99)
ALT SERPL-CCNC: 39 U/L (ref 2–50)
ANION GAP SERPL CALC-SCNC: 8 MMOL/L (ref 0–11.9)
AST SERPL-CCNC: 12 U/L (ref 12–45)
BASOPHILS # BLD AUTO: 0.5 % (ref 0–1.8)
BASOPHILS # BLD: 0.03 K/UL (ref 0–0.12)
BILIRUB SERPL-MCNC: 0.3 MG/DL (ref 0.1–1.5)
BUN SERPL-MCNC: 12 MG/DL (ref 8–22)
CALCIUM SERPL-MCNC: 8.4 MG/DL (ref 8.5–10.5)
CHLORIDE SERPL-SCNC: 112 MMOL/L (ref 96–112)
CK SERPL-CCNC: 15 U/L (ref 0–154)
CO2 SERPL-SCNC: 18 MMOL/L (ref 20–33)
CREAT SERPL-MCNC: 0.92 MG/DL (ref 0.5–1.4)
EOSINOPHIL # BLD AUTO: 0.22 K/UL (ref 0–0.51)
EOSINOPHIL NFR BLD: 3.4 % (ref 0–6.9)
ERYTHROCYTE [DISTWIDTH] IN BLOOD BY AUTOMATED COUNT: 50.9 FL (ref 35.9–50)
GLOBULIN SER CALC-MCNC: 1.7 G/DL (ref 1.9–3.5)
GLUCOSE SERPL-MCNC: 92 MG/DL (ref 65–99)
HCT VFR BLD AUTO: 39.7 % (ref 37–47)
HGB BLD-MCNC: 12.6 G/DL (ref 12–16)
IMM GRANULOCYTES # BLD AUTO: 0.04 K/UL (ref 0–0.11)
IMM GRANULOCYTES NFR BLD AUTO: 0.6 % (ref 0–0.9)
LYMPHOCYTES # BLD AUTO: 2.07 K/UL (ref 1–4.8)
LYMPHOCYTES NFR BLD: 32.3 % (ref 22–41)
MAGNESIUM SERPL-MCNC: 1.9 MG/DL (ref 1.5–2.5)
MCH RBC QN AUTO: 29.9 PG (ref 27–33)
MCHC RBC AUTO-ENTMCNC: 31.7 G/DL (ref 33.6–35)
MCV RBC AUTO: 94.3 FL (ref 81.4–97.8)
MONOCYTES # BLD AUTO: 0.43 K/UL (ref 0–0.85)
MONOCYTES NFR BLD AUTO: 6.7 % (ref 0–13.4)
NEUTROPHILS # BLD AUTO: 3.62 K/UL (ref 2–7.15)
NEUTROPHILS NFR BLD: 56.5 % (ref 44–72)
NRBC # BLD AUTO: 0 K/UL
NRBC BLD-RTO: 0 /100 WBC
PHOSPHATE SERPL-MCNC: 3.2 MG/DL (ref 2.5–4.5)
PLATELET # BLD AUTO: 160 K/UL (ref 164–446)
PMV BLD AUTO: 11.7 FL (ref 9–12.9)
POTASSIUM SERPL-SCNC: 3.6 MMOL/L (ref 3.6–5.5)
PROT SERPL-MCNC: 5 G/DL (ref 6–8.2)
RBC # BLD AUTO: 4.21 M/UL (ref 4.2–5.4)
SODIUM SERPL-SCNC: 138 MMOL/L (ref 135–145)
VIT B12 SERPL-MCNC: 338 PG/ML (ref 211–911)
WBC # BLD AUTO: 6.4 K/UL (ref 4.8–10.8)

## 2020-02-28 PROCEDURE — A9270 NON-COVERED ITEM OR SERVICE: HCPCS | Performed by: INTERNAL MEDICINE

## 2020-02-28 PROCEDURE — 82607 VITAMIN B-12: CPT

## 2020-02-28 PROCEDURE — 700102 HCHG RX REV CODE 250 W/ 637 OVERRIDE(OP): Performed by: STUDENT IN AN ORGANIZED HEALTH CARE EDUCATION/TRAINING PROGRAM

## 2020-02-28 PROCEDURE — 82550 ASSAY OF CK (CPK): CPT

## 2020-02-28 PROCEDURE — 80053 COMPREHEN METABOLIC PANEL: CPT

## 2020-02-28 PROCEDURE — 83735 ASSAY OF MAGNESIUM: CPT

## 2020-02-28 PROCEDURE — 700101 HCHG RX REV CODE 250: Performed by: INTERNAL MEDICINE

## 2020-02-28 PROCEDURE — 99225 PR SUBSEQUENT OBSERVATION CARE,LEVEL II: CPT | Mod: GC | Performed by: INTERNAL MEDICINE

## 2020-02-28 PROCEDURE — 700102 HCHG RX REV CODE 250 W/ 637 OVERRIDE(OP): Performed by: INTERNAL MEDICINE

## 2020-02-28 PROCEDURE — A9270 NON-COVERED ITEM OR SERVICE: HCPCS | Performed by: STUDENT IN AN ORGANIZED HEALTH CARE EDUCATION/TRAINING PROGRAM

## 2020-02-28 PROCEDURE — 36415 COLL VENOUS BLD VENIPUNCTURE: CPT

## 2020-02-28 PROCEDURE — 700111 HCHG RX REV CODE 636 W/ 250 OVERRIDE (IP): Performed by: STUDENT IN AN ORGANIZED HEALTH CARE EDUCATION/TRAINING PROGRAM

## 2020-02-28 PROCEDURE — 96365 THER/PROPH/DIAG IV INF INIT: CPT

## 2020-02-28 PROCEDURE — G0378 HOSPITAL OBSERVATION PER HR: HCPCS

## 2020-02-28 PROCEDURE — 84100 ASSAY OF PHOSPHORUS: CPT

## 2020-02-28 PROCEDURE — 700105 HCHG RX REV CODE 258: Performed by: STUDENT IN AN ORGANIZED HEALTH CARE EDUCATION/TRAINING PROGRAM

## 2020-02-28 PROCEDURE — 85025 COMPLETE CBC W/AUTO DIFF WBC: CPT

## 2020-02-28 PROCEDURE — 700111 HCHG RX REV CODE 636 W/ 250 OVERRIDE (IP): Performed by: INTERNAL MEDICINE

## 2020-02-28 PROCEDURE — 96372 THER/PROPH/DIAG INJ SC/IM: CPT | Mod: XU

## 2020-02-28 RX ORDER — POTASSIUM CHLORIDE 20 MEQ/1
40 TABLET, EXTENDED RELEASE ORAL ONCE
Status: COMPLETED | OUTPATIENT
Start: 2020-02-28 | End: 2020-02-28

## 2020-02-28 RX ORDER — AMOXICILLIN 250 MG
2 CAPSULE ORAL 2 TIMES DAILY
Status: DISCONTINUED | OUTPATIENT
Start: 2020-02-28 | End: 2020-03-24 | Stop reason: HOSPADM

## 2020-02-28 RX ORDER — MAGNESIUM SULFATE HEPTAHYDRATE 40 MG/ML
2 INJECTION, SOLUTION INTRAVENOUS ONCE
Status: COMPLETED | OUTPATIENT
Start: 2020-02-28 | End: 2020-02-28

## 2020-02-28 RX ORDER — POLYETHYLENE GLYCOL 3350 17 G/17G
1 POWDER, FOR SOLUTION ORAL 2 TIMES DAILY
Status: DISCONTINUED | OUTPATIENT
Start: 2020-02-28 | End: 2020-03-24 | Stop reason: HOSPADM

## 2020-02-28 RX ORDER — CYANOCOBALAMIN 1000 UG/ML
1000 INJECTION, SOLUTION INTRAMUSCULAR; SUBCUTANEOUS ONCE
Status: COMPLETED | OUTPATIENT
Start: 2020-02-28 | End: 2020-02-28

## 2020-02-28 RX ORDER — BISACODYL 10 MG
10 SUPPOSITORY, RECTAL RECTAL
Status: DISCONTINUED | OUTPATIENT
Start: 2020-02-28 | End: 2020-03-24 | Stop reason: HOSPADM

## 2020-02-28 RX ADMIN — TRAZODONE HYDROCHLORIDE 50 MG: 100 TABLET ORAL at 17:46

## 2020-02-28 RX ADMIN — CYANOCOBALAMIN 1000 MCG: 1000 INJECTION, SOLUTION INTRAMUSCULAR; SUBCUTANEOUS at 17:47

## 2020-02-28 RX ADMIN — ZIPRASIDONE HYDROCHLORIDE 40 MG: 40 CAPSULE ORAL at 05:34

## 2020-02-28 RX ADMIN — POTASSIUM CHLORIDE 40 MEQ: 1500 TABLET, EXTENDED RELEASE ORAL at 09:27

## 2020-02-28 RX ADMIN — BUSPIRONE HYDROCHLORIDE 10 MG: 10 TABLET ORAL at 17:46

## 2020-02-28 RX ADMIN — ZIPRASIDONE HYDROCHLORIDE 40 MG: 40 CAPSULE ORAL at 17:51

## 2020-02-28 RX ADMIN — OXYCODONE HYDROCHLORIDE AND ACETAMINOPHEN 1 TABLET: 5; 325 TABLET ORAL at 05:33

## 2020-02-28 RX ADMIN — BUDESONIDE AND FORMOTEROL FUMARATE DIHYDRATE 2 PUFF: 160; 4.5 AEROSOL RESPIRATORY (INHALATION) at 05:35

## 2020-02-28 RX ADMIN — PREDNISONE 7.5 MG: 5 TABLET ORAL at 05:34

## 2020-02-28 RX ADMIN — SODIUM CHLORIDE, POTASSIUM CHLORIDE, SODIUM LACTATE AND CALCIUM CHLORIDE: 600; 310; 30; 20 INJECTION, SOLUTION INTRAVENOUS at 05:41

## 2020-02-28 RX ADMIN — PREGABALIN 300 MG: 100 CAPSULE ORAL at 17:46

## 2020-02-28 RX ADMIN — FLUOXETINE HYDROCHLORIDE 40 MG: 20 CAPSULE ORAL at 05:35

## 2020-02-28 RX ADMIN — OXYCODONE HYDROCHLORIDE AND ACETAMINOPHEN 1 TABLET: 5; 325 TABLET ORAL at 20:19

## 2020-02-28 RX ADMIN — SENNOSIDES AND DOCUSATE SODIUM 2 TABLET: 8.6; 5 TABLET ORAL at 05:43

## 2020-02-28 RX ADMIN — OXCARBAZEPINE 150 MG: 150 TABLET, FILM COATED ORAL at 05:33

## 2020-02-28 RX ADMIN — BUDESONIDE AND FORMOTEROL FUMARATE DIHYDRATE 2 PUFF: 160; 4.5 AEROSOL RESPIRATORY (INHALATION) at 17:48

## 2020-02-28 RX ADMIN — PREGABALIN 300 MG: 100 CAPSULE ORAL at 05:34

## 2020-02-28 RX ADMIN — MYCOPHENOLATE MOFETIL 1000 MG: 250 CAPSULE ORAL at 03:53

## 2020-02-28 RX ADMIN — ACETAZOLAMIDE 500 MG: 500 CAPSULE, EXTENDED RELEASE ORAL at 12:57

## 2020-02-28 RX ADMIN — ASPIRIN 81 MG: 81 TABLET, COATED ORAL at 05:35

## 2020-02-28 RX ADMIN — LIDOCAINE 1 PATCH: 50 PATCH TOPICAL at 05:35

## 2020-02-28 RX ADMIN — MYCOPHENOLATE MOFETIL 1000 MG: 250 CAPSULE ORAL at 17:52

## 2020-02-28 RX ADMIN — LEVOTHYROXINE SODIUM 75 MCG: 75 TABLET ORAL at 05:34

## 2020-02-28 RX ADMIN — OXYCODONE HYDROCHLORIDE AND ACETAMINOPHEN 1 TABLET: 5; 325 TABLET ORAL at 00:27

## 2020-02-28 RX ADMIN — ACETAZOLAMIDE 500 MG: 500 CAPSULE, EXTENDED RELEASE ORAL at 17:46

## 2020-02-28 RX ADMIN — ENOXAPARIN SODIUM 40 MG: 100 INJECTION SUBCUTANEOUS at 10:04

## 2020-02-28 RX ADMIN — ACETAZOLAMIDE 500 MG: 500 CAPSULE, EXTENDED RELEASE ORAL at 05:35

## 2020-02-28 RX ADMIN — MAGNESIUM SULFATE 2 G: 2 INJECTION INTRAVENOUS at 10:04

## 2020-02-28 RX ADMIN — OXCARBAZEPINE 150 MG: 150 TABLET, FILM COATED ORAL at 17:50

## 2020-02-28 RX ADMIN — BUSPIRONE HYDROCHLORIDE 10 MG: 10 TABLET ORAL at 05:35

## 2020-02-28 RX ADMIN — OXYCODONE HYDROCHLORIDE AND ACETAMINOPHEN 1 TABLET: 5; 325 TABLET ORAL at 11:27

## 2020-02-28 NOTE — CARE PLAN
Problem: Communication  Goal: The ability to communicate needs accurately and effectively will improve  Outcome: PROGRESSING AS EXPECTED     Problem: Discharge Barriers/Planning  Goal: Patient's continuum of care needs will be met  Outcome: PROGRESSING AS EXPECTED     Problem: Pain Management  Goal: Pain level will decrease to patient's comfort goal  Outcome: PROGRESSING AS EXPECTED

## 2020-02-28 NOTE — PROGRESS NOTES
Daily Progress Note:     Date of Service: 2/28/2020  Primary Team: [facility] IM White Team   Attending: Kadeem Alvarenga M.D.   Senior Resident: Dr. Abreu  Intern: Dr. Camarillo  Contact:  [phone]    Chief Complaint:   History of fall and injury to back following with both leg weakness/numbness below the knees    Subjective:  Patient says she is feeling better than yesterday but still have weakness in both her legs and she has to get help to transfer to the wheelchair.  She has a history of headache increasing and double vision and blurred vision and she says her eyes are drooping.  Denies any fever, nausea, vomiting, abdominal pain, urinary symptoms.    Seen by neurologist thinks it is not a neurological problem at this time appreciate recommendations.  Seen by psychiatrist adjusted medication appreciate recommendations.  Seen by OT/PT no inpatient PT/OT needs at this time.    Consultants/Specialty:  Neurology  Psychiatric    Review of Systems:    Constitutional: Negative for fever and chills  HEENT: Positive for headache, blurred vision and double vision negative for dizziness  Respiratory: Negative for shortness of breath and cough  Cardiovascular: Negative for chest pain and palpitations  Gastrointestinal: Negative for abdominal pain, nausea vomiting  Genitourinary: Negative for dysuria and frequency  Musculoskeletal: Positive for back pain and weakness and numbness of both extremities below the knees.  Skin: Negative for rash and itching  Neurological: Positive for headache, dizziness, numbness, loss of sensation below the knees  Endo/heme/allergies: Does not bleed/bruise easily:  Psychiatric/behavioral: Negative for SI/HI    Objective Data: Patient lying in bed in no distress  Physical Exam:   Vitals:   Temp:  [36.1 °C (97 °F)-37.1 °C (98.7 °F)] 36.2 °C (97.2 °F)  Pulse:  [56-82] 56  Resp:  [16-20] 19  BP: (102-123)/(54-71) 123/59  SpO2:  [93 %-96 %] 93 %     Constitutional: Obese female in bed in no acute  distress  HEENT: EOMI, normocephalic, atraumatic, mucous membrane pink and moist, no scleral icterus  Cardiovascular: S1-S2 normal no murmurs  Pulmonary: Air entry good bilaterally no wheezing or crackles  Gastrointestinal soft, nontender, bowel sounds normal  Musculoskeletal: Point tenderness at C3, no swelling but both lower extremities weakness with decreased sensation per patient temperature normal today over the legs bilaterally  Neurological: Alert oriented, no cranial nerve deficits, normal strength in the upper extremities but decreased in lower extremities    Labs:   Recent Labs     02/27/20 0249 02/28/20  0553   WBC 8.7 6.4   RBC 4.16* 4.21   HEMOGLOBIN 12.9 12.6   HEMATOCRIT 38.3 39.7   MCV 92.1 94.3   MCH 31.0 29.9   RDW 48.0 50.9*   PLATELETCT 170 160*   MPV 11.5 11.7   NEUTSPOLYS 66.20 56.50   LYMPHOCYTES 21.10* 32.30   MONOCYTES 8.40 6.70   EOSINOPHILS 3.30 3.40   BASOPHILS 0.30 0.50     Recent Labs     02/27/20 0220 02/28/20  0553 02/28/20  1025   SODIUM 140 138  --    POTASSIUM 3.6 3.6  --    CHLORIDE 111 112  --    CO2 18* 18*  --    GLUCOSE 103* 92  --    BUN 16 12  --    CPKTOTAL  --   --  15     Recent Labs     02/27/20 0220 02/28/20  0553   ALBUMIN 4.0 3.3   TBILIRUBIN 0.4 0.3   ALKPHOSPHAT 58 56   TOTPROTEIN 6.3 5.0*   ALTSGPT 22 39   ASTSGOT 10* 12   CREATININE 0.86 0.92     Imaging:     IR-US GUIDED PIV   Final Result    Ultrasound-guided PERIPHERAL IV INSERTION performed by    qualified nursing staff as above.            US-JOSHUA SINGLE LEVEL BILAT   Final Result      DX-ANKLE 3+ VIEWS LEFT   Final Result         1.  No acute traumatic bony injury.         DX-ANKLE 3+ VIEWS RIGHT   Final Result         1.  No acute traumatic bony injury.         CT-HEAD W/O   Final Result         1.  No acute intracranial abnormality.      CT-LSPINE W/O PLUS RECONS   Final Result         1.  No acute traumatic bony injury of the lumbar spine.   2.  Punctate left renal calculus without visualized  obstructive changes.      [id number] VIEW    (Results Pending)       * Leg weakness, bilateral  Assessment & Plan  Following fall also associated with back pain with point tenderness and loss of sensation below knees.   CT spine ruled out any acute pathology   Patient unable to undergo MRI because of Stimulator in place.   Doppler JOSHUA  There is no evidence of major arterial disease demonstrated bilaterally.  neurology consult, appreciate recommendations  Psychiatric consult, appreciate recommendations  Q4 neuro checks.   Pain control for LBP  PT/OT: No inpatient needs at this time appreciate recommendations      Intracranial pressure increased  Assessment & Plan  Has slowly worsening headache with new black spot on right eye.   Patients doctor increased diamox dose to 3 times daily outpatient to monitor till Monday and to further increase if not improving .     Plan :  continue diamox 3 times daily.    TONYA (obstructive sleep apnea)- (present on admission)  Assessment & Plan  Not on CPAP at home. Patient has not followed up with doctor yet for the same.   Follow up outpatient.     DM (diabetes mellitus) (HCC)- (present on admission)  Assessment & Plan  Continue home meds.   DUlaglutide injection every Friday .   Diabetic diet.    Asthma- (present on admission)  Assessment & Plan  Continue home meds    Hypothyroidism- (present on admission)  Assessment & Plan  Continue home levothyroxine 75mcg  For TSH

## 2020-02-28 NOTE — PROGRESS NOTES
12 hr cc    Patient rested well overnight.  Pain controlled with oral pain medication.  Patient used bedpan well with good urine output.

## 2020-02-28 NOTE — PROGRESS NOTES
Tele    SR  65-73  Rare PVC  0.16/0.10/0.36    12 hr cc    Patient rested well overnight and used call light appropriately.  Turns self to use bedpan tolerating well.  Good urine output.  Neuro exam unchanged overnight.  Numbness with no response to painful stimuli of feet and lower legs.  Patient able to lift them off bed but only able to hold them a 2-3 seconds instead of 5 as requested.  No other deficits noted.

## 2020-02-28 NOTE — CARE PLAN
Problem: Communication  Goal: The ability to communicate needs accurately and effectively will improve  Outcome: PROGRESSING AS EXPECTED     Problem: Safety  Goal: Will remain free from injury  Outcome: PROGRESSING AS EXPECTED  Goal: Will remain free from falls  Outcome: PROGRESSING AS EXPECTED  Note: Remains free from falls and injury this shift.     Problem: Pain Management  Goal: Pain level will decrease to patient's comfort goal  Outcome: PROGRESSING AS EXPECTED     Problem: Urinary Elimination:  Goal: Ability to reestablish a normal urinary elimination pattern will improve  Outcome: PROGRESSING AS EXPECTED     Problem: Mobility  Goal: Risk for activity intolerance will decrease  Outcome: PROGRESSING SLOWER THAN EXPECTED

## 2020-02-29 LAB
T4 FREE SERPL-MCNC: 0.76 NG/DL (ref 0.53–1.43)
TSH SERPL DL<=0.005 MIU/L-ACNC: 5.65 UIU/ML (ref 0.38–5.33)

## 2020-02-29 PROCEDURE — A9270 NON-COVERED ITEM OR SERVICE: HCPCS | Performed by: STUDENT IN AN ORGANIZED HEALTH CARE EDUCATION/TRAINING PROGRAM

## 2020-02-29 PROCEDURE — G0378 HOSPITAL OBSERVATION PER HR: HCPCS

## 2020-02-29 PROCEDURE — A9270 NON-COVERED ITEM OR SERVICE: HCPCS | Performed by: INTERNAL MEDICINE

## 2020-02-29 PROCEDURE — 700111 HCHG RX REV CODE 636 W/ 250 OVERRIDE (IP): Performed by: INTERNAL MEDICINE

## 2020-02-29 PROCEDURE — 84443 ASSAY THYROID STIM HORMONE: CPT

## 2020-02-29 PROCEDURE — 96372 THER/PROPH/DIAG INJ SC/IM: CPT

## 2020-02-29 PROCEDURE — 36415 COLL VENOUS BLD VENIPUNCTURE: CPT

## 2020-02-29 PROCEDURE — 700102 HCHG RX REV CODE 250 W/ 637 OVERRIDE(OP): Performed by: INTERNAL MEDICINE

## 2020-02-29 PROCEDURE — 700101 HCHG RX REV CODE 250: Performed by: INTERNAL MEDICINE

## 2020-02-29 PROCEDURE — 700102 HCHG RX REV CODE 250 W/ 637 OVERRIDE(OP): Performed by: STUDENT IN AN ORGANIZED HEALTH CARE EDUCATION/TRAINING PROGRAM

## 2020-02-29 PROCEDURE — 84439 ASSAY OF FREE THYROXINE: CPT

## 2020-02-29 PROCEDURE — 700111 HCHG RX REV CODE 636 W/ 250 OVERRIDE (IP): Performed by: STUDENT IN AN ORGANIZED HEALTH CARE EDUCATION/TRAINING PROGRAM

## 2020-02-29 PROCEDURE — 99225 PR SUBSEQUENT OBSERVATION CARE,LEVEL II: CPT | Mod: GC | Performed by: INTERNAL MEDICINE

## 2020-02-29 RX ADMIN — OXCARBAZEPINE 150 MG: 150 TABLET, FILM COATED ORAL at 16:43

## 2020-02-29 RX ADMIN — POLYETHYLENE GLYCOL (3350) 1 PACKET: 17 POWDER, FOR SOLUTION ORAL at 16:43

## 2020-02-29 RX ADMIN — TRAZODONE HYDROCHLORIDE 50 MG: 100 TABLET ORAL at 16:44

## 2020-02-29 RX ADMIN — OXYCODONE HYDROCHLORIDE AND ACETAMINOPHEN 1 TABLET: 5; 325 TABLET ORAL at 13:51

## 2020-02-29 RX ADMIN — OXYCODONE HYDROCHLORIDE AND ACETAMINOPHEN 1 TABLET: 5; 325 TABLET ORAL at 04:54

## 2020-02-29 RX ADMIN — LIDOCAINE 1 PATCH: 50 PATCH TOPICAL at 05:01

## 2020-02-29 RX ADMIN — PREDNISONE 7.5 MG: 5 TABLET ORAL at 04:51

## 2020-02-29 RX ADMIN — ZIPRASIDONE HYDROCHLORIDE 40 MG: 40 CAPSULE ORAL at 16:43

## 2020-02-29 RX ADMIN — ACETAZOLAMIDE 500 MG: 500 CAPSULE, EXTENDED RELEASE ORAL at 04:49

## 2020-02-29 RX ADMIN — BUSPIRONE HYDROCHLORIDE 10 MG: 10 TABLET ORAL at 04:53

## 2020-02-29 RX ADMIN — OXYCODONE HYDROCHLORIDE AND ACETAMINOPHEN 1 TABLET: 5; 325 TABLET ORAL at 09:39

## 2020-02-29 RX ADMIN — ACETAZOLAMIDE 500 MG: 500 CAPSULE, EXTENDED RELEASE ORAL at 12:42

## 2020-02-29 RX ADMIN — PREGABALIN 300 MG: 100 CAPSULE ORAL at 04:50

## 2020-02-29 RX ADMIN — PREGABALIN 300 MG: 100 CAPSULE ORAL at 16:44

## 2020-02-29 RX ADMIN — BUDESONIDE AND FORMOTEROL FUMARATE DIHYDRATE 2 PUFF: 160; 4.5 AEROSOL RESPIRATORY (INHALATION) at 04:55

## 2020-02-29 RX ADMIN — BUDESONIDE AND FORMOTEROL FUMARATE DIHYDRATE 2 PUFF: 160; 4.5 AEROSOL RESPIRATORY (INHALATION) at 16:43

## 2020-02-29 RX ADMIN — ACETAMINOPHEN 650 MG: 325 TABLET, FILM COATED ORAL at 12:42

## 2020-02-29 RX ADMIN — MYCOPHENOLATE MOFETIL 1000 MG: 250 CAPSULE ORAL at 05:13

## 2020-02-29 RX ADMIN — ZIPRASIDONE HYDROCHLORIDE 40 MG: 40 CAPSULE ORAL at 04:54

## 2020-02-29 RX ADMIN — MYCOPHENOLATE MOFETIL 1000 MG: 250 CAPSULE ORAL at 16:20

## 2020-02-29 RX ADMIN — FLUOXETINE HYDROCHLORIDE 40 MG: 20 CAPSULE ORAL at 04:48

## 2020-02-29 RX ADMIN — SENNOSIDES AND DOCUSATE SODIUM 2 TABLET: 8.6; 5 TABLET ORAL at 16:43

## 2020-02-29 RX ADMIN — ASPIRIN 81 MG: 81 TABLET, COATED ORAL at 04:54

## 2020-02-29 RX ADMIN — LEVOTHYROXINE SODIUM 75 MCG: 75 TABLET ORAL at 04:53

## 2020-02-29 RX ADMIN — ACETAZOLAMIDE 500 MG: 500 CAPSULE, EXTENDED RELEASE ORAL at 16:43

## 2020-02-29 RX ADMIN — ENOXAPARIN SODIUM 40 MG: 100 INJECTION SUBCUTANEOUS at 04:55

## 2020-02-29 RX ADMIN — BUSPIRONE HYDROCHLORIDE 10 MG: 10 TABLET ORAL at 16:44

## 2020-02-29 RX ADMIN — OXCARBAZEPINE 150 MG: 150 TABLET, FILM COATED ORAL at 04:49

## 2020-02-29 RX ADMIN — OXYCODONE HYDROCHLORIDE AND ACETAMINOPHEN 1 TABLET: 5; 325 TABLET ORAL at 20:10

## 2020-02-29 NOTE — PROGRESS NOTES
Patient takes Trulicity at home every Friday for DM control. Not currently on MAR. MD paged to request order.  Pharmacist stated not in formulary and MD told. MD stated it was okay for patient to take home dose once cleared by pharmacy.  Patient's mother instructed to bring in medication for inspection and administration.

## 2020-02-29 NOTE — PROGRESS NOTES
Monitor summary: SR 72-76, MD 0.16, QRS 0.10, QT 0.38, with rare PVCs per strip from monitor room.

## 2020-02-29 NOTE — PROGRESS NOTES
Monitor Summary: SR 63 - 76, HI -.14, QRS -.08, QT -.40, with rare PVC per strip from the monitor room.

## 2020-02-29 NOTE — PROGRESS NOTES
Daily Progress Note:     Date of Service: 2/29/2020  Primary Team: [facility] White Team   Attending: Kadeem Alvarenga M.D.   Senior Resident: Dr. Abreu  Intern: Dr. Camarillo  Contact:  [phone]    Chief Complaint:   History of fall and injury to back following with both leg weakness/numbness below the knees    Subjective:  Patient having no new overnight complaints he is feeling better than before but still cannot transfer from bed to the wheelchair she is feeling pain in both ankles.  Denies any fever, nausea, vomiting, abdominal pain, numbness symptoms.    Consultants/Specialty:  Neurology  Psychiatric    Review of Systems:    Constitutional: Negative for fever and chills  HEENT: Positive for headache, blurred vision and double vision negative for dizziness  Respiratory: Negative for shortness of breath and cough  Cardiovascular: Negative for chest pain and palpitations  Gastrointestinal: Negative for abdominal pain, nausea vomiting  Genitourinary: Negative for dysuria and frequency  Musculoskeletal: Positive for back pain and weakness and numbness of both extremities below the knees.  Skin: Negative for rash and itching  Neurological: Positive for headache, dizziness, numbness, loss of sensation below the knees  Endo/heme/allergies: Does not bleed/bruise easily:  Psychiatric/behavioral: Negative for SI/HI    Objective Data: Patient sitting in wheelchair talking to her family in no acute distress    Physical Exam:   Vitals:   Temp:  [36.1 °C (96.9 °F)-36.4 °C (97.6 °F)] 36.4 °C (97.6 °F)  Pulse:  [72-98] 73  Resp:  [16-19] 18  BP: (108-131)/(45-62) 108/53  SpO2:  [94 %-98 %] 94 %    Constitutional: Obese female in no acute distress  HEENT: Normocephalic, atraumatic, mucous membrane pink and moist, no scleral icterus  Cardiovascular: S1-S2 normal no murmurs  Pulmonary: Air entry good bilaterally no wheezing and crackles  Gastrointestinal: Soft nontender bowel sounds normal  Musculoskeletal: Warm lower  extremities with palpable pulses mild tenderness over the L3 area  Neurological: Alert, oriented, no cranial nerve deficits, normal strength in the upper extremities but decreased in lower extremities.    Labs:   Recent Labs     02/27/20 0249 02/28/20  0553   WBC 8.7 6.4   RBC 4.16* 4.21   HEMOGLOBIN 12.9 12.6   HEMATOCRIT 38.3 39.7   MCV 92.1 94.3   MCH 31.0 29.9   RDW 48.0 50.9*   PLATELETCT 170 160*   MPV 11.5 11.7   NEUTSPOLYS 66.20 56.50   LYMPHOCYTES 21.10* 32.30   MONOCYTES 8.40 6.70   EOSINOPHILS 3.30 3.40   BASOPHILS 0.30 0.50     Recent Labs     02/27/20 0220 02/28/20  0553 02/28/20  1025   SODIUM 140 138  --    POTASSIUM 3.6 3.6  --    CHLORIDE 111 112  --    CO2 18* 18*  --    GLUCOSE 103* 92  --    BUN 16 12  --    CPKTOTAL  --   --  15     Recent Labs     02/27/20 0220 02/28/20  0553   ALBUMIN 4.0 3.3   TBILIRUBIN 0.4 0.3   ALKPHOSPHAT 58 56   TOTPROTEIN 6.3 5.0*   ALTSGPT 22 39   ASTSGOT 10* 12   CREATININE 0.86 0.92     Imaging:   IR-US GUIDED PIV   Final Result    Ultrasound-guided PERIPHERAL IV INSERTION performed by    qualified nursing staff as above.            US-JOSHUA SINGLE LEVEL BILAT   Final Result      DX-ANKLE 3+ VIEWS LEFT   Final Result         1.  No acute traumatic bony injury.         DX-ANKLE 3+ VIEWS RIGHT   Final Result         1.  No acute traumatic bony injury.         CT-HEAD W/O   Final Result         1.  No acute intracranial abnormality.      CT-LSPINE W/O PLUS RECONS   Final Result         1.  No acute traumatic bony injury of the lumbar spine.   2.  Punctate left renal calculus without visualized obstructive changes.          * Leg weakness, bilateral  Assessment & Plan  Improving  Following fall also associated with back pain with point tenderness and loss of sensation below knees.   CT spine ruled out any acute pathology   Patient unable to undergo MRI because of Stimulator in place.   Doppler JOSHUA  There is no evidence of major arterial disease demonstrated  bilaterally.  neurology consult, appreciate recommendations  Psychiatric consult, appreciate recommendations  Q4 neuro checks.   Pain control for LBP  PT/OT: No inpatient needs at this time appreciate recommendations      Intracranial pressure increased  Assessment & Plan  Has slowly worsening headache with new black spot on right eye.   Patients doctor increased diamox dose to 3 times daily outpatient to monitor till Monday and to further increase if not improving .     Plan :  continue diamox 3 times daily.    TONYA (obstructive sleep apnea)- (present on admission)  Assessment & Plan  Not on CPAP at home. Patient has not followed up with doctor yet for the same.   Follow up outpatient.     DM (diabetes mellitus) (HCC)- (present on admission)  Assessment & Plan  Continue home meds.   DUlaglutide injection every Friday .   Diabetic diet.    Asthma- (present on admission)  Assessment & Plan  Continue home meds    Hypothyroidism- (present on admission)  Assessment & Plan  TSH 5.65  Continue home levothyroxine 75mcg

## 2020-02-29 NOTE — PROGRESS NOTES
2 RN skin check:    Performed with: SONYA Pal    Sacrum, elbows, heels, occiput, ears, and shoulders, intact and blanching. 3 open sores noted to abdomen, sores noted to L breast and chest, covered with bandages from home. Q 2 hr turns in place to reduce skin breakdown. Pt participating in turns, able to turn self when prompted. Education provided on activity and mobilization encouraged.

## 2020-02-29 NOTE — CARE PLAN
Problem: Pain Management  Goal: Pain level will decrease to patient's comfort goal  Outcome: PROGRESSING AS EXPECTED   Pt requesting percocet for pain coverage. Pain adequately controlled with PRN percocet.    Problem: Urinary Elimination:  Goal: Ability to reestablish a normal urinary elimination pattern will improve  Outcome: PROGRESSING SLOWER THAN EXPECTED   Pt incontinent of urine at beginning of shift.

## 2020-03-01 ENCOUNTER — APPOINTMENT (OUTPATIENT)
Dept: RADIOLOGY | Facility: MEDICAL CENTER | Age: 30
DRG: 880 | End: 2020-03-01
Attending: STUDENT IN AN ORGANIZED HEALTH CARE EDUCATION/TRAINING PROGRAM
Payer: MEDICARE

## 2020-03-01 PROCEDURE — 76856 US EXAM PELVIC COMPLETE: CPT

## 2020-03-01 PROCEDURE — G0378 HOSPITAL OBSERVATION PER HR: HCPCS

## 2020-03-01 PROCEDURE — 96375 TX/PRO/DX INJ NEW DRUG ADDON: CPT

## 2020-03-01 PROCEDURE — 700102 HCHG RX REV CODE 250 W/ 637 OVERRIDE(OP): Performed by: STUDENT IN AN ORGANIZED HEALTH CARE EDUCATION/TRAINING PROGRAM

## 2020-03-01 PROCEDURE — A9270 NON-COVERED ITEM OR SERVICE: HCPCS | Performed by: INTERNAL MEDICINE

## 2020-03-01 PROCEDURE — 700101 HCHG RX REV CODE 250: Performed by: INTERNAL MEDICINE

## 2020-03-01 PROCEDURE — 700111 HCHG RX REV CODE 636 W/ 250 OVERRIDE (IP): Performed by: STUDENT IN AN ORGANIZED HEALTH CARE EDUCATION/TRAINING PROGRAM

## 2020-03-01 PROCEDURE — 99225 PR SUBSEQUENT OBSERVATION CARE,LEVEL II: CPT | Mod: GC | Performed by: INTERNAL MEDICINE

## 2020-03-01 PROCEDURE — 700111 HCHG RX REV CODE 636 W/ 250 OVERRIDE (IP): Performed by: INTERNAL MEDICINE

## 2020-03-01 PROCEDURE — 700102 HCHG RX REV CODE 250 W/ 637 OVERRIDE(OP): Performed by: INTERNAL MEDICINE

## 2020-03-01 PROCEDURE — 96372 THER/PROPH/DIAG INJ SC/IM: CPT

## 2020-03-01 PROCEDURE — A9270 NON-COVERED ITEM OR SERVICE: HCPCS | Performed by: STUDENT IN AN ORGANIZED HEALTH CARE EDUCATION/TRAINING PROGRAM

## 2020-03-01 RX ORDER — MORPHINE SULFATE 4 MG/ML
2 INJECTION, SOLUTION INTRAMUSCULAR; INTRAVENOUS ONCE
Status: COMPLETED | OUTPATIENT
Start: 2020-03-01 | End: 2020-03-01

## 2020-03-01 RX ADMIN — OXYCODONE HYDROCHLORIDE AND ACETAMINOPHEN 1 TABLET: 5; 325 TABLET ORAL at 09:47

## 2020-03-01 RX ADMIN — ASPIRIN 81 MG: 81 TABLET, COATED ORAL at 05:44

## 2020-03-01 RX ADMIN — POLYETHYLENE GLYCOL (3350) 1 PACKET: 17 POWDER, FOR SOLUTION ORAL at 05:45

## 2020-03-01 RX ADMIN — TRAZODONE HYDROCHLORIDE 50 MG: 100 TABLET ORAL at 18:30

## 2020-03-01 RX ADMIN — ZIPRASIDONE HYDROCHLORIDE 40 MG: 40 CAPSULE ORAL at 05:43

## 2020-03-01 RX ADMIN — OXYCODONE HYDROCHLORIDE AND ACETAMINOPHEN 1 TABLET: 5; 325 TABLET ORAL at 13:46

## 2020-03-01 RX ADMIN — BUDESONIDE AND FORMOTEROL FUMARATE DIHYDRATE 2 PUFF: 160; 4.5 AEROSOL RESPIRATORY (INHALATION) at 18:33

## 2020-03-01 RX ADMIN — LEVOTHYROXINE SODIUM 75 MCG: 75 TABLET ORAL at 05:43

## 2020-03-01 RX ADMIN — OXCARBAZEPINE 150 MG: 150 TABLET, FILM COATED ORAL at 18:31

## 2020-03-01 RX ADMIN — BUSPIRONE HYDROCHLORIDE 10 MG: 10 TABLET ORAL at 05:44

## 2020-03-01 RX ADMIN — SENNOSIDES AND DOCUSATE SODIUM 2 TABLET: 8.6; 5 TABLET ORAL at 18:31

## 2020-03-01 RX ADMIN — ACETAZOLAMIDE 500 MG: 500 CAPSULE, EXTENDED RELEASE ORAL at 13:46

## 2020-03-01 RX ADMIN — ZIPRASIDONE HYDROCHLORIDE 40 MG: 40 CAPSULE ORAL at 18:31

## 2020-03-01 RX ADMIN — PREDNISONE 7.5 MG: 5 TABLET ORAL at 05:43

## 2020-03-01 RX ADMIN — MYCOPHENOLATE MOFETIL 1000 MG: 250 CAPSULE ORAL at 05:43

## 2020-03-01 RX ADMIN — FLUOXETINE HYDROCHLORIDE 40 MG: 20 CAPSULE ORAL at 05:42

## 2020-03-01 RX ADMIN — BUDESONIDE AND FORMOTEROL FUMARATE DIHYDRATE 2 PUFF: 160; 4.5 AEROSOL RESPIRATORY (INHALATION) at 05:44

## 2020-03-01 RX ADMIN — OXYCODONE HYDROCHLORIDE AND ACETAMINOPHEN 1 TABLET: 5; 325 TABLET ORAL at 05:43

## 2020-03-01 RX ADMIN — LIDOCAINE 1 PATCH: 50 PATCH TOPICAL at 05:42

## 2020-03-01 RX ADMIN — MYCOPHENOLATE MOFETIL 1000 MG: 250 CAPSULE ORAL at 16:46

## 2020-03-01 RX ADMIN — ACETAZOLAMIDE 500 MG: 500 CAPSULE, EXTENDED RELEASE ORAL at 18:31

## 2020-03-01 RX ADMIN — OXCARBAZEPINE 150 MG: 150 TABLET, FILM COATED ORAL at 07:59

## 2020-03-01 RX ADMIN — SENNOSIDES AND DOCUSATE SODIUM 2 TABLET: 8.6; 5 TABLET ORAL at 05:44

## 2020-03-01 RX ADMIN — ACETAZOLAMIDE 500 MG: 500 CAPSULE, EXTENDED RELEASE ORAL at 05:43

## 2020-03-01 RX ADMIN — ENOXAPARIN SODIUM 40 MG: 100 INJECTION SUBCUTANEOUS at 05:42

## 2020-03-01 RX ADMIN — PREGABALIN 300 MG: 100 CAPSULE ORAL at 05:42

## 2020-03-01 RX ADMIN — OXYCODONE HYDROCHLORIDE AND ACETAMINOPHEN 1 TABLET: 5; 325 TABLET ORAL at 01:20

## 2020-03-01 RX ADMIN — BUSPIRONE HYDROCHLORIDE 10 MG: 10 TABLET ORAL at 18:30

## 2020-03-01 RX ADMIN — MORPHINE SULFATE 2 MG: 4 INJECTION INTRAVENOUS at 16:38

## 2020-03-01 RX ADMIN — PREGABALIN 300 MG: 100 CAPSULE ORAL at 18:30

## 2020-03-01 NOTE — CONSULTS
DATE OF SERVICE:  02/25/2020    HISTORY OF PRESENT ILLNESS:  The patient was evaluated at [facility] on emergency basis on 02/25/2020.  YOB: 1990.  The   patient is 29 years of age and was recently evaluated at the   neuro-ophthalmology clinic on 01/09/2020.  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  In addition, the patient also was noted to have idiopathic   intracranial hypertension unassociated with papilledema.  The patient had   previous lumbar puncture, which demonstrated an opening pressure of 250 mm of   water.  It was elected to maintain the Diamox at 500 mg p.o. daily.    The patient was rehospitalized at [facility] on 02/24/2020.  At   that visit, the patient had been complaining of increased headaches, which   were pan cranial in location and graded at 9 on a scale of 0-10.  The   headaches were both dull aching and sharp stabbing in quality, associated with   nausea, vomiting, photosensitivity, and phonophobia as well as a subjective   bruit.  It is noteworthy that her weight has decreased from 280 to 260 pounds.    The patient underwent a CT angiogram of the head and neck as well as a   lumbar puncture, which demonstrated an opening pressure of 28.5 mm of water.    It should be emphasized that the lumbar puncture was performed with the   patient lying in the left lateral decubitus position in a knee-chest position.    The legs were not extended when the opening pressure was obtained.    ALLERGIES:  Patient is allergic to multiple medications and include:  1. CEFDINIR.  2. DEPAKOTE.  3. ABILIFY.  4. AMITRIPTYLINE.  5. AMOXICILLIN.  6. CIPROFLOXACIN.  7. CLINDAMYCIN.  8. DOXYCYCLINE.  9. ERYTHROMYCIN.  10. FLAGYL.  11. FLOMAX.  12. METFORMIN.  13. SULFA MEDICATIONS.  14. TAPE.  15. VANCOMYCIN.  16. KEFLEX.  17. LEVOFLOXACIN.  18. LEVAQUIN.    MEDICATIONS:  Include:  1.  Diamox 500 mg p.o. daily.  2. Prednisone 5 mg p.o. daily.  3. CellCept 1000 mg p.o. b.i.d.  4. Fentanyl.  5. Januvia.  6. Gralise.  7. Baclofen.  8. Fluoxetine.  9. Corlanor.  10. Percocet.  11. Geodon.  12. Sodium bicarbonate.    PAST MEDICAL HISTORY:  1. Type 2 diabetes mellitus.  2. Hashimoto's thyroiditis.  3. CRION.    PAST SURGICAL HISTORY:  Status post placement of an InterStim pacemaker   (implanted unilateral lead stimulated S3 nerve root).    PAST FAMILY HISTORY:  Mother with a history of hypertension.    SOCIAL HISTORY:  The patient does not smokes nor drinks.    REVIEW OF SYSTEMS:  The patient has a history of cardiac arrhythmia, chest   pain, tinnitus, arthritis, easy fatigability, shortness of breath, headaches,   thyroid disease, and diabetes mellitus.    VISUAL ACUITY:  Best corrected visual acuity, OD 20/20 and OS 20/20.    NEAR VISUAL ACUITY:  OD J1 plus and OS J1.    HRR plates, 5 of 6 color plates, OU.    AMSLER GRID TESTING:  There was a waviness with good markings, OD.  OS normal.    CONFRONTATION VISUAL FIELDS:  OD normal.  OS normal.    PUPILS:  The right pupil measured 3.0 mm, left pupil measured 3.25 mm.  Both   pupils were briskly reactive to light, not associated with afferent pupillary   defect.    FLICKER:  OD 26 Hz.  OS 29 Hz.    SLIT LAMP EXAMINATION:  Normal.    TENSIONS:  25 and 21.    MOTILITY EXAMINATION:  Primary gaze:  1 prism diopter of exotropia.  Right gaze:  1 prism diopter of exotropia.  Left gaze:  1 prism diopter of exotropia.  Upgaze:  1 prism diopter of exotropia.  Downgaze:  1 prism diopter of exotropia.    DILATED OPHTHALMOSCOPY:  OD:  The cup-to-disk ratio measured 0.5.  The right optic nerve, macula   appeared normal.  There was a superior peripapillary nevus.    OS:  The cup cup-to-disk ratio measured 0.4.  The optic nerve, macula and   peripheral retina appeared normal.    CHRISTINA VISUAL FIELD:  30-2 Deferred.    OCT:  The mean retinal nerve fiber layer thickness  was borderline normal OD at   91 microns and borderline normal OS at 92 microns.  The macular thickness map   was normal, OU.    FUNDUS PHOTOGRAPHY:  Showed healthy-appearing optic nerves and bilateral   anomalous disc.    FUNDUSCOPIC EXAMINATION:  OD:  There was no evidence of papilledema.  OS:  There was no evidence of papilledema.    CT angiogram of the head on 02/24/2020 was normal.    CT angiogram of the neck pending.    Laboratory studies were obtained on 02/14/2020.  Comprehensive metabolic panel   showed an elevated glucose level at 125.    Cerebrospinal fluid on 02/24/2020:  The total white blood cell count was 2.0.    There was 42% lymphs.  The beta hCG was negative.  C-reactive protein was   normal.    Laboratory studies were obtained on 01/22/2020.  The comprehensive metabolic   panel showed an elevated chloride at 110.  The CBC was normal.    ASSESSMENT:  1. 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  2. Remote acute retrobulbar optic neuritis.  3. Remote retrobulbar optic neuritis, OD.  4. Recurrent optic neuritis, OD 09/08/2018.  5. Recurrent idiopathic intracranial hypertension unassociated with   papilledema.  6. Glaucoma suspect.  7. Small-angle esotropia.  8. Type 2 diabetes mellitus.  9. Remote history of paroxysmal atrial fibrillation.  10. Undifferentiated autoimmune disease.  11. Morbid obesity.  12. Status post lumbar laminectomy at the L4-L5 level in 2012.  13. History of bowel and bladder incontinence.  14. Status post placement of an InterStim pacemaker, dysmorphic facies.  15. Right common peroneal nerve palsy.  16. Peripapillary nevus, OD.  17. Nonsmoker.    RECOMMENDATIONS:  1. Increase the Diamox to 500 mg p.o. b.i.d., maximum target dosage of 1000 mg   p.o. b.i.d.  2. Stress the importance of an exercise program.  3. Stress the importance of a weight-reducing diet.  4. Maintain the prednisone 7.5 mg p.o. daily.  5. Maintain the CellCept 1000 mg p.o.  patsy  6. Follow up neuro-ophthalmology clinic on 06/29/2020.    COMPLEX PATIENT:  Time spent 40 minutes, history, physical examination,   diagnoses, differential diagnoses, and treatment strategies.             ____________________________________     DO SHAHRZAD AGOSTO / CHRISTELLE    DD:  02/29/2020 13:01:42  DT:  02/29/2020 15:22:06    D#:  [id number]  Job#:  [id number]

## 2020-03-01 NOTE — PROGRESS NOTES
Received handoff report from day shift RN. Pt. is A&Ox4. Pt. has c/o numbness and tingling, and weakness in BLE, cannot lift legs from sitting onto bed. At this time time pt. has no c/o pain and states no needs.

## 2020-03-01 NOTE — PROGRESS NOTES
Daily Progress Note:     Date of Service: 3/1/2020  Primary Team: [facility] White Team   Attending: Kadeem Alvarenga M.D.   Senior Resident: Dr. Abreu  Intern: Dr. Camarillo  Contact:  [phone]    Chief Complaint:   History of fall and injury to back following both legs weakness/numbness below the knees    Subjective:  Patient have no acute overnight events patient is feeling fine better than before and her headache decreased no whooshing sound back of the head.  Still have weakness in both lower limbs and the sensation is there and she could able to move.  Denies any fever, nausea, vomiting, abdominal pain.    Consultants/Specialty:  Neurology  Psychiatric    Review of Systems:    Constitutional: Negative for fever and chills  HEENT: Positive for headache, blurred vision and double vision negative for dizziness  Respiratory: Negative for shortness of breath and cough  Cardiovascular: Negative for chest pain and palpitations  Gastrointestinal: Negative for abdominal pain, nausea vomiting  Genitourinary: Negative for dysuria and frequency  Musculoskeletal: Positive for back pain and weakness and numbness of both extremities below the knees.  Skin: Negative for rash and itching  Neurological: Positive for headache, dizziness, numbness, loss of sensation below the knees  Endo/heme/allergies: Does not bleed/bruise easily:  Psychiatric/behavioral: Negative for SI/HI    Objective Data: Patient sleeping comfortably in bed in no acute distress  Physical Exam:   Vitals:   Temp:  [36 °C (96.8 °F)-36.7 °C (98.1 °F)] 36.7 °C (98.1 °F)  Pulse:  [62-78] 72  Resp:  [15-24] 16  BP: ()/(50-63) 110/50  SpO2:  [94 %-97 %] 96 %    Constitutional: Obese female lying in bed in no acute distress  HEENT: Normocephalic, atraumatic, mucous membrane pink and moist, no scleral icterus  Cardiovascular: S1-S2 normal no murmurs  Pulmonary: Air entry good bilaterally no wheezing and crackles  Gastrointestinal: Soft nontender bowel sounds  normal  Musculoskeletal: Warm lower extremities with palpable pulses no tenderness  Neurological: Neurologic oriented, alert no cranial nerve deficits normal strength in upper extremities but decreased in lower extremities sensation normal.    Labs:   Recent Labs     02/28/20  0553   WBC 6.4   RBC 4.21   HEMOGLOBIN 12.6   HEMATOCRIT 39.7   MCV 94.3   MCH 29.9   RDW 50.9*   PLATELETCT 160*   MPV 11.7   NEUTSPOLYS 56.50   LYMPHOCYTES 32.30   MONOCYTES 6.70   EOSINOPHILS 3.40   BASOPHILS 0.50     Recent Labs     02/28/20  0553 02/28/20  1025   SODIUM 138  --    POTASSIUM 3.6  --    CHLORIDE 112  --    CO2 18*  --    GLUCOSE 92  --    BUN 12  --    CPKTOTAL  --  15     Recent Labs     02/28/20  0553   ALBUMIN 3.3   TBILIRUBIN 0.3   ALKPHOSPHAT 56   TOTPROTEIN 5.0*   ALTSGPT 39   ASTSGOT 12   CREATININE 0.92     Imaging:   IR-US GUIDED PIV   Final Result    Ultrasound-guided PERIPHERAL IV INSERTION performed by    qualified nursing staff as above.            US-JOSHUA SINGLE LEVEL BILAT   Final Result      DX-ANKLE 3+ VIEWS LEFT   Final Result         1.  No acute traumatic bony injury.         DX-ANKLE 3+ VIEWS RIGHT   Final Result         1.  No acute traumatic bony injury.         CT-HEAD W/O   Final Result         1.  No acute intracranial abnormality.      CT-LSPINE W/O PLUS RECONS   Final Result         1.  No acute traumatic bony injury of the lumbar spine.   2.  Punctate left renal calculus without visualized obstructive changes.          * Leg weakness, bilateral  Assessment & Plan  Improving  Following fall also associated with back pain with point tenderness and loss of sensation below knees.   CT spine ruled out any acute pathology   Patient unable to undergo MRI because of Stimulator in place.   Doppler JOSHUA  There is no evidence of major arterial disease demonstrated bilaterally.  neurology consult, appreciate recommendations  Psychiatric consult, appreciate recommendations  Q4 neuro checks.   Pain control for  LBP  PT/OT: No inpatient needs at this time appreciate recommendations      Intracranial pressure increased  Assessment & Plan  Has slowly worsening headache with new black spot on right eye.   Patients doctor increased diamox dose to 3 times daily outpatient to monitor till Monday and to further increase if not improving .     Plan :  continue diamox 3 times daily.    TONYA (obstructive sleep apnea)- (present on admission)  Assessment & Plan  Not on CPAP at home. Patient has not followed up with doctor yet for the same.   Follow up outpatient.     DM (diabetes mellitus) (HCC)- (present on admission)  Assessment & Plan  Continue home meds.   DUlaglutide injection every Friday .   Diabetic diet.    Asthma- (present on admission)  Assessment & Plan  Continue home meds    Hypothyroidism- (present on admission)  Assessment & Plan  TSH 5.65  Continue home levothyroxine 75mcg

## 2020-03-01 NOTE — CARE PLAN
Problem: Safety  Goal: Will remain free from injury  Outcome: PROGRESSING AS EXPECTED  Goal: Will remain free from falls  Outcome: PROGRESSING AS EXPECTED     Problem: Infection  Goal: Will remain free from infection  Outcome: PROGRESSING AS EXPECTED     Problem: Venous Thromboembolism (VTW)/Deep Vein Thrombosis (DVT) Prevention:  Goal: Patient will participate in Venous Thrombosis (VTE)/Deep Vein Thrombosis (DVT)Prevention Measures  Outcome: PROGRESSING AS EXPECTED     Problem: Bowel/Gastric:  Goal: Normal bowel function is maintained or improved  Outcome: PROGRESSING AS EXPECTED  Goal: Will not experience complications related to bowel motility  Outcome: PROGRESSING AS EXPECTED     Problem: Knowledge Deficit  Goal: Knowledge of disease process/condition, treatment plan, diagnostic tests, and medications will improve  Outcome: PROGRESSING AS EXPECTED  Goal: Knowledge of the prescribed therapeutic regimen will improve  Outcome: PROGRESSING AS EXPECTED     Problem: Pain Management  Goal: Pain level will decrease to patient's comfort goal  Outcome: PROGRESSING AS EXPECTED     Problem: Urinary Elimination:  Goal: Ability to reestablish a normal urinary elimination pattern will improve  Outcome: PROGRESSING AS EXPECTED     Problem: Discharge Barriers/Planning  Goal: Patient's continuum of care needs will be met  Outcome: PROGRESSING SLOWER THAN EXPECTED     Problem: Mobility  Goal: Risk for activity intolerance will decrease  Outcome: PROGRESSING SLOWER THAN EXPECTED  Note: Pt reports no control over her lower extremities and occasional tingling in hands.  Does not tolerate being in chair for over a half hour due to dizziness.

## 2020-03-01 NOTE — CARE PLAN
Pt educated on ambulation and fall risks, collaboration with PT/OT, all ambulation devices out of sight while not in use, proper signage hung, fall precautions in place.     Encourage use of the call light, encourage pt to voice feelings, assess pt needs hourly including pain and toileting, provide interpretative services as needed.

## 2020-03-01 NOTE — PROGRESS NOTES
Monitor summary: SR 65-76, TX 0.20, QRS 0.12, QT 0.36, with rare PVCs per strip from monitor room.

## 2020-03-01 NOTE — PROGRESS NOTES
Pt was found sitting on the floor next to her commode asking for help. Pt said she was trying to re-position while sitting on the commode when the right handle of the commode dropped down, she said she was left off balance and slid down to the floor. Pt does not complain of any new pain, denies hitting her head. Dr. Camarillo notified, no new orders at this time. Vitals taken, vitals signs stable. Grandmother Vivienne notified as well.

## 2020-03-02 ENCOUNTER — APPOINTMENT (OUTPATIENT)
Dept: BEHAVIORAL HEALTH | Facility: CLINIC | Age: 30
End: 2020-03-02
Payer: MEDICARE

## 2020-03-02 PROBLEM — F25.1 SCHIZOAFFECTIVE DISORDER, DEPRESSIVE TYPE (HCC): Status: ACTIVE | Noted: 2020-03-02

## 2020-03-02 PROBLEM — F43.10 PTSD (POST-TRAUMATIC STRESS DISORDER): Status: ACTIVE | Noted: 2020-03-02

## 2020-03-02 PROCEDURE — 96372 THER/PROPH/DIAG INJ SC/IM: CPT

## 2020-03-02 PROCEDURE — A9270 NON-COVERED ITEM OR SERVICE: HCPCS | Performed by: INTERNAL MEDICINE

## 2020-03-02 PROCEDURE — 700101 HCHG RX REV CODE 250: Performed by: INTERNAL MEDICINE

## 2020-03-02 PROCEDURE — G0378 HOSPITAL OBSERVATION PER HR: HCPCS

## 2020-03-02 PROCEDURE — 700102 HCHG RX REV CODE 250 W/ 637 OVERRIDE(OP): Performed by: STUDENT IN AN ORGANIZED HEALTH CARE EDUCATION/TRAINING PROGRAM

## 2020-03-02 PROCEDURE — 700111 HCHG RX REV CODE 636 W/ 250 OVERRIDE (IP): Performed by: STUDENT IN AN ORGANIZED HEALTH CARE EDUCATION/TRAINING PROGRAM

## 2020-03-02 PROCEDURE — 700111 HCHG RX REV CODE 636 W/ 250 OVERRIDE (IP): Performed by: INTERNAL MEDICINE

## 2020-03-02 PROCEDURE — A9270 NON-COVERED ITEM OR SERVICE: HCPCS | Performed by: STUDENT IN AN ORGANIZED HEALTH CARE EDUCATION/TRAINING PROGRAM

## 2020-03-02 PROCEDURE — 99225 PR SUBSEQUENT OBSERVATION CARE,LEVEL II: CPT | Mod: GC | Performed by: INTERNAL MEDICINE

## 2020-03-02 PROCEDURE — 700102 HCHG RX REV CODE 250 W/ 637 OVERRIDE(OP): Performed by: INTERNAL MEDICINE

## 2020-03-02 RX ORDER — LEVOTHYROXINE SODIUM 0.1 MG/1
100 TABLET ORAL
Status: DISCONTINUED | OUTPATIENT
Start: 2020-03-03 | End: 2020-03-24 | Stop reason: HOSPADM

## 2020-03-02 RX ADMIN — SENNOSIDES AND DOCUSATE SODIUM 2 TABLET: 8.6; 5 TABLET ORAL at 06:25

## 2020-03-02 RX ADMIN — ZIPRASIDONE HYDROCHLORIDE 40 MG: 40 CAPSULE ORAL at 17:43

## 2020-03-02 RX ADMIN — BUDESONIDE AND FORMOTEROL FUMARATE DIHYDRATE 2 PUFF: 160; 4.5 AEROSOL RESPIRATORY (INHALATION) at 17:44

## 2020-03-02 RX ADMIN — OXYCODONE HYDROCHLORIDE AND ACETAMINOPHEN 1 TABLET: 5; 325 TABLET ORAL at 19:42

## 2020-03-02 RX ADMIN — OXCARBAZEPINE 150 MG: 150 TABLET, FILM COATED ORAL at 17:43

## 2020-03-02 RX ADMIN — FLUOXETINE HYDROCHLORIDE 40 MG: 20 CAPSULE ORAL at 06:24

## 2020-03-02 RX ADMIN — SENNOSIDES AND DOCUSATE SODIUM 2 TABLET: 8.6; 5 TABLET ORAL at 17:43

## 2020-03-02 RX ADMIN — ZIPRASIDONE HYDROCHLORIDE 40 MG: 40 CAPSULE ORAL at 06:25

## 2020-03-02 RX ADMIN — BUDESONIDE AND FORMOTEROL FUMARATE DIHYDRATE 2 PUFF: 160; 4.5 AEROSOL RESPIRATORY (INHALATION) at 06:20

## 2020-03-02 RX ADMIN — TRAZODONE HYDROCHLORIDE 50 MG: 100 TABLET ORAL at 17:43

## 2020-03-02 RX ADMIN — OXCARBAZEPINE 150 MG: 150 TABLET, FILM COATED ORAL at 06:25

## 2020-03-02 RX ADMIN — MYCOPHENOLATE MOFETIL 1000 MG: 250 CAPSULE ORAL at 17:43

## 2020-03-02 RX ADMIN — PREGABALIN 300 MG: 100 CAPSULE ORAL at 06:24

## 2020-03-02 RX ADMIN — ACETAMINOPHEN 650 MG: 325 TABLET, FILM COATED ORAL at 16:17

## 2020-03-02 RX ADMIN — ACETAMINOPHEN 650 MG: 325 TABLET, FILM COATED ORAL at 03:24

## 2020-03-02 RX ADMIN — LEVOTHYROXINE SODIUM 75 MCG: 75 TABLET ORAL at 06:25

## 2020-03-02 RX ADMIN — OXYCODONE HYDROCHLORIDE AND ACETAMINOPHEN 1 TABLET: 5; 325 TABLET ORAL at 14:23

## 2020-03-02 RX ADMIN — OXYCODONE HYDROCHLORIDE AND ACETAMINOPHEN 1 TABLET: 5; 325 TABLET ORAL at 01:06

## 2020-03-02 RX ADMIN — ENOXAPARIN SODIUM 40 MG: 100 INJECTION SUBCUTANEOUS at 06:22

## 2020-03-02 RX ADMIN — BUSPIRONE HYDROCHLORIDE 10 MG: 10 TABLET ORAL at 06:24

## 2020-03-02 RX ADMIN — ACETAZOLAMIDE 500 MG: 500 CAPSULE, EXTENDED RELEASE ORAL at 06:23

## 2020-03-02 RX ADMIN — PREDNISONE 7.5 MG: 5 TABLET ORAL at 06:23

## 2020-03-02 RX ADMIN — ACETAZOLAMIDE 500 MG: 500 CAPSULE, EXTENDED RELEASE ORAL at 12:30

## 2020-03-02 RX ADMIN — MYCOPHENOLATE MOFETIL 1000 MG: 250 CAPSULE ORAL at 06:26

## 2020-03-02 RX ADMIN — ACETAMINOPHEN 650 MG: 325 TABLET, FILM COATED ORAL at 09:49

## 2020-03-02 RX ADMIN — ACETAZOLAMIDE 500 MG: 500 CAPSULE, EXTENDED RELEASE ORAL at 17:43

## 2020-03-02 RX ADMIN — LIDOCAINE 1 PATCH: 50 PATCH TOPICAL at 06:21

## 2020-03-02 RX ADMIN — PREGABALIN 300 MG: 100 CAPSULE ORAL at 17:43

## 2020-03-02 RX ADMIN — BUSPIRONE HYDROCHLORIDE 10 MG: 10 TABLET ORAL at 17:43

## 2020-03-02 RX ADMIN — OXYCODONE HYDROCHLORIDE AND ACETAMINOPHEN 1 TABLET: 5; 325 TABLET ORAL at 08:14

## 2020-03-02 RX ADMIN — ASPIRIN 81 MG: 81 TABLET, COATED ORAL at 06:25

## 2020-03-02 NOTE — CARE PLAN
Problem: Infection  Goal: Will remain free from infection  Outcome: PROGRESSING AS EXPECTED     Problem: Venous Thromboembolism (VTW)/Deep Vein Thrombosis (DVT) Prevention:  Goal: Patient will participate in Venous Thrombosis (VTE)/Deep Vein Thrombosis (DVT)Prevention Measures  Outcome: PROGRESSING AS EXPECTED     Problem: Bowel/Gastric:  Goal: Normal bowel function is maintained or improved  Outcome: PROGRESSING AS EXPECTED  Goal: Will not experience complications related to bowel motility  Outcome: PROGRESSING AS EXPECTED     Problem: Knowledge Deficit  Goal: Knowledge of disease process/condition, treatment plan, diagnostic tests, and medications will improve  Outcome: PROGRESSING AS EXPECTED  Goal: Knowledge of the prescribed therapeutic regimen will improve  Outcome: PROGRESSING AS EXPECTED     Problem: Discharge Barriers/Planning  Goal: Patient's continuum of care needs will be met  Outcome: PROGRESSING AS EXPECTED     Problem: Pain Management  Goal: Pain level will decrease to patient's comfort goal  Outcome: PROGRESSING AS EXPECTED     Problem: Urinary Elimination:  Goal: Ability to reestablish a normal urinary elimination pattern will improve  Outcome: PROGRESSING AS EXPECTED     Problem: Mobility  Goal: Risk for activity intolerance will decrease  Outcome: PROGRESSING AS EXPECTED     Problem: Skin Integrity  Goal: Risk for impaired skin integrity will decrease  Outcome: PROGRESSING AS EXPECTED     Problem: Safety  Goal: Will remain free from falls  Outcome: PROGRESSING SLOWER THAN EXPECTED     Problem: Psychosocial Needs:  Goal: Level of anxiety will decrease  Outcome: PROGRESSING SLOWER THAN EXPECTED  Note: Pt demonstrating increased anxiety

## 2020-03-02 NOTE — PROGRESS NOTES
1015: Pt demonstrates generalized shaking and decreased responsiveness.  VSS, tele unchanged, respiratory rate regular and unlabored.  Pt responds to eyelids raised for pupil check.  Pt is able to help move her right arm from against the rail.

## 2020-03-02 NOTE — PROGRESS NOTES
Monitor Summary: Pt is in NSR with no ectopy.  HR 69-76, PA 0.16, QRS 0.10, QT 0.36 per strip from the monitor room.

## 2020-03-02 NOTE — PROGRESS NOTES
Daily Progress Note:     Date of Service: 3/2/2020  Primary Team: [facility] White Team   Attending: Kadeem Alvarenga M.D.   Senior Resident: Dr. Uribe  Intern: Dr. Camarillo  Contact:  [phone]    Chief Complaint:   History of fall and injury to back following both legs weakness/numbness below the knees    Subjective:  Patient having no acute overnight events denies any headache, fever, nausea, vomiting, abdominal pain.    Consultants/Specialty:  Neurology  Psychiatric    Review of Systems:    Constitutional: Negative for fever and chills  HEENT: Positive for headache, blurred vision and double vision negative for dizziness  Respiratory: Negative for shortness of breath and cough  Cardiovascular: Negative for chest pain and palpitations  Gastrointestinal: Negative for abdominal pain, nausea vomiting  Genitourinary: Negative for dysuria and frequency  Musculoskeletal: Positive for back pain and weakness and numbness of both extremities below the knees.  Skin: Negative for rash and itching  Neurological: Positive for headache, dizziness, numbness, loss of sensation below the knees  Endo/heme/allergies: Does not bleed/bruise easily:  Psychiatric/behavioral: Negative for SI/HI    Objective Data: Patient sleeping comfortably in bed in no acute distress  Physical Exam:   Vitals:   Temp:  [36.1 °C (97 °F)-36.7 °C (98.1 °F)] 36.7 °C (98.1 °F)  Pulse:  [75-82] 82  Resp:  [16-18] 16  BP: (104-120)/(51-75) 106/51  SpO2:  [95 %-98 %] 95 %     Constitutional: Obese female lying in bed in no acute distress  HEENT: Normocephalic, atraumatic, mucous membrane pink and moist, no scleral icterus  Cardiovascular: S1-S2 normal no murmurs  Pulmonary: Air entry good bilaterally no wheezing and crackles  Gastrointestinal: Soft nontender bowel sounds normal  Musculoskeletal: Warm lower extremities with palpable pulses no tenderness moving slightly  Neurological: Oriented, alert no cranial nerve deficits normal in strength upper  extremities decreased in lower extremities sensation is normal    Labs:   None  Imaging:   US-PELVIC COMPLETE (TRANSABDOMINAL/TRANSVAGINAL) (COMBO)   Final Result         Redemonstration of 3.7 x 2.8 cm cyst in the right ovary, slightly smaller than prior. No surrounding fluid to suggest rupture.      Debris in the urinary bladder. Correlate with UA.      IR-US GUIDED PIV   Final Result    Ultrasound-guided PERIPHERAL IV INSERTION performed by    qualified nursing staff as above.            US-JOSHUA SINGLE LEVEL BILAT   Final Result      DX-ANKLE 3+ VIEWS LEFT   Final Result         1.  No acute traumatic bony injury.         DX-ANKLE 3+ VIEWS RIGHT   Final Result         1.  No acute traumatic bony injury.         CT-HEAD W/O   Final Result         1.  No acute intracranial abnormality.      CT-LSPINE W/O PLUS RECONS   Final Result         1.  No acute traumatic bony injury of the lumbar spine.   2.  Punctate left renal calculus without visualized obstructive changes.          * Leg weakness, bilateral  Assessment & Plan  Improving  Following fall also associated with back pain with point tenderness and loss of sensation below knees.   CT spine ruled out any acute pathology   Patient unable to undergo MRI because of Stimulator in place.   Doppler JOSHUA  There is no evidence of major arterial disease demonstrated bilaterally.  neurology consult, appreciate recommendations  Psychiatric consult, appreciate recommendations  Q4 neuro checks.   Pain control for LBP  PT/OT consult for discharge planning      Intracranial pressure increased  Assessment & Plan  Has slowly worsening headache with new black spot on right eye.   Patients doctor increased diamox dose to 3 times daily outpatient to monitor till Monday and to further increase if not improving .     Plan :  continue diamox 3 times daily.    TONYA (obstructive sleep apnea)- (present on admission)  Assessment & Plan  Not on CPAP at home. Patient has not followed up with  doctor yet for the same.   Follow up outpatient.     DM (diabetes mellitus) (HCC)- (present on admission)  Assessment & Plan  Continue home meds.   DUlaglutide injection every Friday .   Diabetic diet.    Asthma- (present on admission)  Assessment & Plan  Continue home meds    Hypothyroidism- (present on admission)  Assessment & Plan  TSH 5.65  Continue home levothyroxine 75mcg

## 2020-03-02 NOTE — FACE TO FACE
Face to Face Supporting Documentation - Home Health    The encounter with this patient was in whole or in part the primary reason for home health admission.    Date of encounter:   Patient:                    MRN:                       YOB: 2021  Kristin Balderrama  [id number]  1990     Home health to see patient for:  Skilled Nursing care for assessment, interventions & education    Skilled need for:  Exacerbation of Chronic Disease State weakness in legs.    Skilled nursing interventions to include:  Comment: nursing needs.    Homebound status evidenced by:  Need the aid of supportive devices such as crutches, canes, wheelchairs or walkers. Leaving home requires a considerable and taxing effort. There is a normal inability to leave the home.    Community Physician to provide follow up care: Torres Brody M.D.     Optional Interventions? No      I certify the face to face encounter for this home health care referral meets the CMS requirements and the encounter/clinical assessment with the patient was, in whole, or in part, for the medical condition(s) listed above, which is the primary reason for home health care. Based on my clinical findings: the service(s) are medically necessary, support the need for home health care, and the homebound criteria are met.  I certify that this patient has had a face to face encounter by myself.  Ortiz Camarillo M.D. - NPI: [id number]

## 2020-03-03 PROCEDURE — 700101 HCHG RX REV CODE 250: Performed by: INTERNAL MEDICINE

## 2020-03-03 PROCEDURE — 96375 TX/PRO/DX INJ NEW DRUG ADDON: CPT

## 2020-03-03 PROCEDURE — 700111 HCHG RX REV CODE 636 W/ 250 OVERRIDE (IP): Performed by: STUDENT IN AN ORGANIZED HEALTH CARE EDUCATION/TRAINING PROGRAM

## 2020-03-03 PROCEDURE — 700102 HCHG RX REV CODE 250 W/ 637 OVERRIDE(OP): Performed by: INTERNAL MEDICINE

## 2020-03-03 PROCEDURE — A9270 NON-COVERED ITEM OR SERVICE: HCPCS | Performed by: INTERNAL MEDICINE

## 2020-03-03 PROCEDURE — 99225 PR SUBSEQUENT OBSERVATION CARE,LEVEL II: CPT | Mod: GC | Performed by: INTERNAL MEDICINE

## 2020-03-03 PROCEDURE — 700111 HCHG RX REV CODE 636 W/ 250 OVERRIDE (IP): Performed by: INTERNAL MEDICINE

## 2020-03-03 PROCEDURE — 700102 HCHG RX REV CODE 250 W/ 637 OVERRIDE(OP): Performed by: STUDENT IN AN ORGANIZED HEALTH CARE EDUCATION/TRAINING PROGRAM

## 2020-03-03 PROCEDURE — 96376 TX/PRO/DX INJ SAME DRUG ADON: CPT

## 2020-03-03 PROCEDURE — G0378 HOSPITAL OBSERVATION PER HR: HCPCS

## 2020-03-03 PROCEDURE — A9270 NON-COVERED ITEM OR SERVICE: HCPCS | Performed by: STUDENT IN AN ORGANIZED HEALTH CARE EDUCATION/TRAINING PROGRAM

## 2020-03-03 PROCEDURE — 96372 THER/PROPH/DIAG INJ SC/IM: CPT

## 2020-03-03 RX ORDER — KETOROLAC TROMETHAMINE 30 MG/ML
30 INJECTION, SOLUTION INTRAMUSCULAR; INTRAVENOUS ONCE
Status: COMPLETED | OUTPATIENT
Start: 2020-03-03 | End: 2020-03-03

## 2020-03-03 RX ORDER — MORPHINE SULFATE 4 MG/ML
2 INJECTION, SOLUTION INTRAMUSCULAR; INTRAVENOUS ONCE
Status: COMPLETED | OUTPATIENT
Start: 2020-03-03 | End: 2020-03-03

## 2020-03-03 RX ADMIN — MORPHINE SULFATE 2 MG: 4 INJECTION INTRAVENOUS at 17:11

## 2020-03-03 RX ADMIN — OXYCODONE HYDROCHLORIDE AND ACETAMINOPHEN 1 TABLET: 5; 325 TABLET ORAL at 14:14

## 2020-03-03 RX ADMIN — ACETAMINOPHEN 650 MG: 325 TABLET, FILM COATED ORAL at 07:55

## 2020-03-03 RX ADMIN — KETOROLAC TROMETHAMINE 30 MG: 30 INJECTION, SOLUTION INTRAMUSCULAR at 15:32

## 2020-03-03 RX ADMIN — BUSPIRONE HYDROCHLORIDE 10 MG: 10 TABLET ORAL at 18:13

## 2020-03-03 RX ADMIN — ASPIRIN 81 MG: 81 TABLET, COATED ORAL at 06:03

## 2020-03-03 RX ADMIN — MYCOPHENOLATE MOFETIL 1000 MG: 250 CAPSULE ORAL at 16:51

## 2020-03-03 RX ADMIN — BUDESONIDE AND FORMOTEROL FUMARATE DIHYDRATE 2 PUFF: 160; 4.5 AEROSOL RESPIRATORY (INHALATION) at 18:13

## 2020-03-03 RX ADMIN — OXYCODONE HYDROCHLORIDE AND ACETAMINOPHEN 1 TABLET: 5; 325 TABLET ORAL at 03:44

## 2020-03-03 RX ADMIN — ZIPRASIDONE HYDROCHLORIDE 40 MG: 40 CAPSULE ORAL at 18:12

## 2020-03-03 RX ADMIN — SENNOSIDES AND DOCUSATE SODIUM 2 TABLET: 8.6; 5 TABLET ORAL at 18:13

## 2020-03-03 RX ADMIN — SENNOSIDES AND DOCUSATE SODIUM 2 TABLET: 8.6; 5 TABLET ORAL at 06:02

## 2020-03-03 RX ADMIN — PREGABALIN 300 MG: 100 CAPSULE ORAL at 06:03

## 2020-03-03 RX ADMIN — ACETAZOLAMIDE 500 MG: 500 CAPSULE, EXTENDED RELEASE ORAL at 06:03

## 2020-03-03 RX ADMIN — PREGABALIN 300 MG: 100 CAPSULE ORAL at 18:12

## 2020-03-03 RX ADMIN — ACETAZOLAMIDE 500 MG: 500 CAPSULE, EXTENDED RELEASE ORAL at 11:42

## 2020-03-03 RX ADMIN — ACETAZOLAMIDE 500 MG: 500 CAPSULE, EXTENDED RELEASE ORAL at 18:12

## 2020-03-03 RX ADMIN — TRAZODONE HYDROCHLORIDE 50 MG: 100 TABLET ORAL at 18:13

## 2020-03-03 RX ADMIN — OXCARBAZEPINE 150 MG: 150 TABLET, FILM COATED ORAL at 06:03

## 2020-03-03 RX ADMIN — OXYCODONE HYDROCHLORIDE AND ACETAMINOPHEN 1 TABLET: 5; 325 TABLET ORAL at 23:01

## 2020-03-03 RX ADMIN — FLUOXETINE HYDROCHLORIDE 40 MG: 20 CAPSULE ORAL at 06:03

## 2020-03-03 RX ADMIN — PREDNISONE 7.5 MG: 5 TABLET ORAL at 06:03

## 2020-03-03 RX ADMIN — MYCOPHENOLATE MOFETIL 1000 MG: 250 CAPSULE ORAL at 06:03

## 2020-03-03 RX ADMIN — OXCARBAZEPINE 150 MG: 150 TABLET, FILM COATED ORAL at 18:12

## 2020-03-03 RX ADMIN — ZIPRASIDONE HYDROCHLORIDE 40 MG: 40 CAPSULE ORAL at 06:02

## 2020-03-03 RX ADMIN — LEVOTHYROXINE SODIUM 100 MCG: 100 TABLET ORAL at 06:03

## 2020-03-03 RX ADMIN — LIDOCAINE 1 PATCH: 50 PATCH TOPICAL at 06:08

## 2020-03-03 RX ADMIN — OXYCODONE HYDROCHLORIDE AND ACETAMINOPHEN 1 TABLET: 5; 325 TABLET ORAL at 09:56

## 2020-03-03 RX ADMIN — BUDESONIDE AND FORMOTEROL FUMARATE DIHYDRATE 2 PUFF: 160; 4.5 AEROSOL RESPIRATORY (INHALATION) at 06:00

## 2020-03-03 RX ADMIN — BUSPIRONE HYDROCHLORIDE 10 MG: 10 TABLET ORAL at 06:03

## 2020-03-03 RX ADMIN — ENOXAPARIN SODIUM 40 MG: 100 INJECTION SUBCUTANEOUS at 06:04

## 2020-03-03 ASSESSMENT — ENCOUNTER SYMPTOMS
CHILLS: 0
ABDOMINAL PAIN: 1
MYALGIAS: 1
HEARTBURN: 0
NAUSEA: 0
VOMITING: 0
COUGH: 0
FEVER: 0
PALPITATIONS: 0
HEMOPTYSIS: 0
NECK PAIN: 0

## 2020-03-03 NOTE — PROGRESS NOTES
Internal Medicine Interval Note  Note Author: Evens Uribe M.D.     Name Kristin Balderrama     1990   Age/Sex 29 y.o. female   MRN [id number]         After 5PM or if no immediate response to page, please call for cross-coverage  Attending/Team: Dr. Alvarenga See Patient List for primary contact information  Call [phone] to page    1st Call - Day Intern (R1):   Dr. Camarillo 2nd Call - Day Sr. Resident (R2/R3):   Dr. Uribe     Reason for interval visit  (Principal Problem)   Bilateral leg weakness    Interval Problem Daily Status Update  (24 hours, problem oriented, brief subjective history, new lab/imaging data pertinent to that problem)   Discharge to home was discussed this morning with the patient. Her grandma who is the main caretaker states that she has trouble and at this point she is unable to assist with transfers between chair and bed.  The current situation was also discussed with physical therapy, nursing and .  At this point, it appears that PT is unable to provide any more equipment that would facilitate transfer between bed and chair.  We discussed with case management that home with home health might be not sufficient level for the patient.     Review of Systems   Constitutional: Negative for chills and fever.   Respiratory: Negative for cough and hemoptysis.    Cardiovascular: Negative for chest pain and palpitations.   Gastrointestinal: Positive for abdominal pain. Negative for heartburn, nausea and vomiting.   Genitourinary: Negative for dysuria and urgency.   Musculoskeletal: Positive for myalgias. Negative for neck pain.     Disposition/Barriers to discharge:   Home with home health vs.     Consultants/Specialty  Neurology  Psychiatric  PCP: Torres Brody M.D.    Quality Measures  Quality-Core Measures   Reviewed items::  Labs reviewed, Medications reviewed and Radiology images reviewed  Andrade catheter::  No Andrade  DVT prophylaxis  pharmacological::  Enoxaparin (Lovenox)  Ulcer Prophylaxis::  No    Physical Exam       Vitals:    03/03/20 0000 03/03/20 0400 03/03/20 0800 03/03/20 1200   BP: 104/58 115/64 107/53 110/49   Pulse: 70 68 83 81   Resp: 20 16 16 16   Temp: 36.1 °C (97 °F) 36.5 °C (97.7 °F) 36.1 °C (97 °F) 36.3 °C (97.4 °F)   TempSrc: Temporal Temporal Temporal Temporal   SpO2: 99% 99% 98% 94%   Weight:       Height:         Body mass index is 43.43 kg/m².    Oxygen Therapy:  Pulse Oximetry: 94 %, O2 (LPM): 0, O2 Delivery Device: None - Room Air    Physical Exam   Constitutional: She is oriented to person, place, and time and well-developed, well-nourished, and in no distress.   Obese   HENT:   Head: Normocephalic and atraumatic.   Mouth/Throat: No oropharyngeal exudate.   Eyes: Pupils are equal, round, and reactive to light. Conjunctivae are normal. No scleral icterus.   Neck: Normal range of motion. Neck supple. No JVD present. No thyromegaly present.   Cardiovascular: Normal rate, regular rhythm and normal heart sounds.   No murmur heard.  Pulmonary/Chest: Effort normal and breath sounds normal. No respiratory distress. She has no wheezes.   Abdominal: Soft. Bowel sounds are normal. She exhibits no distension. There is no abdominal tenderness. There is no rebound.   Right LQ bruise   Musculoskeletal: Normal range of motion.   Neurological: She is alert and oriented to person, place, and time. No cranial nerve deficit.   Skin: No rash noted. She is not diaphoretic. No erythema.   Psychiatric: Affect normal.         Assessment/Plan     * Leg weakness, bilateral  Assessment & Plan  Patient caregiver states she is unable to provide safe transfers between chair and bed  PT states there is no more equipment they can provide to assist with it  It is likely that patient will require long term care facility placement  Following fall also associated with back pain with point tenderness and loss of sensation below knees.   CT spine ruled out  any acute pathology   Patient unable to undergo MRI because of Stimulator in place.   Doppler JOSHUA  There is no evidence of major arterial disease demonstrated bilaterally.  neurology consult, appreciate recommendations  Psychiatric consult, appreciate recommendations  Q4 neuro checks.   Pain control for LBP  PT/OT consult for discharge planning      Intracranial pressure increased  Assessment & Plan  Has slowly worsening headache with new black spot on right eye.   Patients doctor increased diamox dose to 3 times daily outpatient to monitor till Monday and to further increase if not improving .     Plan :  continue diamox 3 times daily.    TONYA (obstructive sleep apnea)- (present on admission)  Assessment & Plan  Not on CPAP at home. Patient has not followed up with doctor yet for the same.   Follow up outpatient.     DM (diabetes mellitus) (HCC)- (present on admission)  Assessment & Plan  Continue home meds.   DUlaglutide injection every Friday .   Diabetic diet.    Asthma- (present on admission)  Assessment & Plan  Continue home meds    Hypothyroidism- (present on admission)  Assessment & Plan  TSH 5.65  Continue home levothyroxine 75mcg

## 2020-03-03 NOTE — PROGRESS NOTES
Pt is on the phone describing having left sided facial numbness and paralysis with a strong headache and double vision.  MD paged, bedside assessment and new orders follow.  Charge nurse notified of pt's appointment with outpt neurologist tomorrow.  Toradol given for pain.

## 2020-03-03 NOTE — PROGRESS NOTES
Monitor Summary: Pt is in NSR with no ectopy. HR 65-78, MT 0.16, QRS 0.10, QT 0.36 per strip from monitor room.

## 2020-03-03 NOTE — PROGRESS NOTES
"I was called by the patient's nurse as patient reported to have new onset facial droop. She experienced similar symptoms in the past, she called it \"hemoplegic migraine.\" Patient is sensitive to light. Her headache is diffuse throughout her head. She had similar headaches in the past. It is severe. On initial physical examination patient's left eye is nearly closed while the right eye is half way open. V2 of CN V distrubution on further examination appears symmetrical especially when patient is asked to open and close quickly eyes. It is not clear whether the motor distribution of V1 of CN V has any difference in function, but it appears unchanged on physical examination. The V3 distribution is symmetrical bilaterally. The rest of cranial nerves are intact except her having numbness on the left side of the face. Patient reports weakness in her right hand, but her strength in similar in both upper extremities about 4.5/5. Her lower extremities are similar as prior to my examination about 2/5. The signs and symptoms are inconsistent with stroke. The changes are isolated with V2 distrubion of CN V, associated with severe headache, and photophobia. The suspicion for migraine is high. Toradol 30 prn once and morphine 2mg prn once if refractory. Patient does have history of ICP and we may contact neurology if her symptoms are not responsive to medication, she develops new identifiable neurological deficits, or her GCS declines. However, she says that she gets these similar symptoms and the headache is actually not bad as compared to prior headache.  "

## 2020-03-03 NOTE — CARE PLAN
Problem: Safety  Goal: Will remain free from injury  Outcome: PROGRESSING AS EXPECTED  Goal: Will remain free from falls  Outcome: PROGRESSING AS EXPECTED     Problem: Infection  Goal: Will remain free from infection  Outcome: PROGRESSING AS EXPECTED     Problem: Venous Thromboembolism (VTW)/Deep Vein Thrombosis (DVT) Prevention:  Goal: Patient will participate in Venous Thrombosis (VTE)/Deep Vein Thrombosis (DVT)Prevention Measures  Outcome: PROGRESSING AS EXPECTED     Problem: Bowel/Gastric:  Goal: Normal bowel function is maintained or improved  Outcome: PROGRESSING AS EXPECTED  Goal: Will not experience complications related to bowel motility  Outcome: PROGRESSING AS EXPECTED     Problem: Knowledge Deficit  Goal: Knowledge of disease process/condition, treatment plan, diagnostic tests, and medications will improve  Outcome: PROGRESSING AS EXPECTED  Goal: Knowledge of the prescribed therapeutic regimen will improve  Outcome: PROGRESSING AS EXPECTED     Problem: Discharge Barriers/Planning  Goal: Patient's continuum of care needs will be met  Outcome: PROGRESSING AS EXPECTED     Problem: Pain Management  Goal: Pain level will decrease to patient's comfort goal  Outcome: PROGRESSING AS EXPECTED     Problem: Urinary Elimination:  Goal: Ability to reestablish a normal urinary elimination pattern will improve  Outcome: PROGRESSING AS EXPECTED

## 2020-03-03 NOTE — PROGRESS NOTES
Pt Alert and oriented x4, c/o generalized pain to abdomen, sides, BLE. PRN pain medication given, effective. C/o numbness and tingling to BLE. Pt on telemetry monitoring, NSR. Currently resting in bed @ this time. Bed locked in lowest position with call light in reach, bed alarm on.

## 2020-03-04 LAB
APPEARANCE UR: ABNORMAL
BACTERIA #/AREA URNS HPF: ABNORMAL /HPF
BILIRUB UR QL STRIP.AUTO: NEGATIVE
CAOX CRY #/AREA URNS HPF: ABNORMAL /HPF
COLOR UR: YELLOW
EPI CELLS #/AREA URNS HPF: ABNORMAL /HPF
GLUCOSE UR STRIP.AUTO-MCNC: NEGATIVE MG/DL
HYALINE CASTS #/AREA URNS LPF: ABNORMAL /LPF
KETONES UR STRIP.AUTO-MCNC: NEGATIVE MG/DL
LEUKOCYTE ESTERASE UR QL STRIP.AUTO: ABNORMAL
MICRO URNS: ABNORMAL
NITRITE UR QL STRIP.AUTO: POSITIVE
PH UR STRIP.AUTO: 5 [PH] (ref 5–8)
PROT UR QL STRIP: NEGATIVE MG/DL
RBC # URNS HPF: ABNORMAL /HPF
RBC UR QL AUTO: NEGATIVE
SP GR UR STRIP.AUTO: 1.02
UROBILINOGEN UR STRIP.AUTO-MCNC: 0.2 MG/DL
WBC #/AREA URNS HPF: ABNORMAL /HPF

## 2020-03-04 PROCEDURE — A9270 NON-COVERED ITEM OR SERVICE: HCPCS | Performed by: STUDENT IN AN ORGANIZED HEALTH CARE EDUCATION/TRAINING PROGRAM

## 2020-03-04 PROCEDURE — 700111 HCHG RX REV CODE 636 W/ 250 OVERRIDE (IP): Performed by: STUDENT IN AN ORGANIZED HEALTH CARE EDUCATION/TRAINING PROGRAM

## 2020-03-04 PROCEDURE — G0378 HOSPITAL OBSERVATION PER HR: HCPCS

## 2020-03-04 PROCEDURE — 700111 HCHG RX REV CODE 636 W/ 250 OVERRIDE (IP): Performed by: INTERNAL MEDICINE

## 2020-03-04 PROCEDURE — 700102 HCHG RX REV CODE 250 W/ 637 OVERRIDE(OP): Performed by: INTERNAL MEDICINE

## 2020-03-04 PROCEDURE — 700101 HCHG RX REV CODE 250: Performed by: INTERNAL MEDICINE

## 2020-03-04 PROCEDURE — 96372 THER/PROPH/DIAG INJ SC/IM: CPT

## 2020-03-04 PROCEDURE — 87186 SC STD MICRODIL/AGAR DIL: CPT

## 2020-03-04 PROCEDURE — 87086 URINE CULTURE/COLONY COUNT: CPT

## 2020-03-04 PROCEDURE — 81001 URINALYSIS AUTO W/SCOPE: CPT

## 2020-03-04 PROCEDURE — 97535 SELF CARE MNGMENT TRAINING: CPT

## 2020-03-04 PROCEDURE — 700102 HCHG RX REV CODE 250 W/ 637 OVERRIDE(OP): Performed by: STUDENT IN AN ORGANIZED HEALTH CARE EDUCATION/TRAINING PROGRAM

## 2020-03-04 PROCEDURE — A9270 NON-COVERED ITEM OR SERVICE: HCPCS | Performed by: INTERNAL MEDICINE

## 2020-03-04 PROCEDURE — 87077 CULTURE AEROBIC IDENTIFY: CPT

## 2020-03-04 PROCEDURE — 99225 PR SUBSEQUENT OBSERVATION CARE,LEVEL II: CPT | Mod: GC | Performed by: INTERNAL MEDICINE

## 2020-03-04 RX ORDER — ACETAZOLAMIDE 500 MG/1
1000 CAPSULE, EXTENDED RELEASE ORAL 2 TIMES DAILY
Status: DISCONTINUED | OUTPATIENT
Start: 2020-03-04 | End: 2020-03-24 | Stop reason: HOSPADM

## 2020-03-04 RX ADMIN — BUDESONIDE AND FORMOTEROL FUMARATE DIHYDRATE 2 PUFF: 160; 4.5 AEROSOL RESPIRATORY (INHALATION) at 17:49

## 2020-03-04 RX ADMIN — SENNOSIDES AND DOCUSATE SODIUM 2 TABLET: 8.6; 5 TABLET ORAL at 17:48

## 2020-03-04 RX ADMIN — LEVOTHYROXINE SODIUM 100 MCG: 100 TABLET ORAL at 04:36

## 2020-03-04 RX ADMIN — LIDOCAINE 1 PATCH: 50 PATCH TOPICAL at 04:37

## 2020-03-04 RX ADMIN — PREGABALIN 300 MG: 100 CAPSULE ORAL at 17:47

## 2020-03-04 RX ADMIN — PREDNISONE 7.5 MG: 5 TABLET ORAL at 04:36

## 2020-03-04 RX ADMIN — BUSPIRONE HYDROCHLORIDE 10 MG: 10 TABLET ORAL at 04:37

## 2020-03-04 RX ADMIN — ACETAZOLAMIDE 500 MG: 500 CAPSULE, EXTENDED RELEASE ORAL at 12:00

## 2020-03-04 RX ADMIN — MYCOPHENOLATE MOFETIL 1000 MG: 250 CAPSULE ORAL at 20:42

## 2020-03-04 RX ADMIN — OXYCODONE HYDROCHLORIDE AND ACETAMINOPHEN 1 TABLET: 5; 325 TABLET ORAL at 04:36

## 2020-03-04 RX ADMIN — OXCARBAZEPINE 150 MG: 150 TABLET, FILM COATED ORAL at 17:47

## 2020-03-04 RX ADMIN — MYCOPHENOLATE MOFETIL 1000 MG: 250 CAPSULE ORAL at 04:36

## 2020-03-04 RX ADMIN — ENOXAPARIN SODIUM 40 MG: 100 INJECTION SUBCUTANEOUS at 04:38

## 2020-03-04 RX ADMIN — ACETAZOLAMIDE 500 MG: 500 CAPSULE, EXTENDED RELEASE ORAL at 04:36

## 2020-03-04 RX ADMIN — ZIPRASIDONE HYDROCHLORIDE 40 MG: 40 CAPSULE ORAL at 06:33

## 2020-03-04 RX ADMIN — BUDESONIDE AND FORMOTEROL FUMARATE DIHYDRATE 2 PUFF: 160; 4.5 AEROSOL RESPIRATORY (INHALATION) at 04:37

## 2020-03-04 RX ADMIN — OXYCODONE HYDROCHLORIDE AND ACETAMINOPHEN 1 TABLET: 5; 325 TABLET ORAL at 23:50

## 2020-03-04 RX ADMIN — ACETAMINOPHEN 650 MG: 325 TABLET, FILM COATED ORAL at 15:18

## 2020-03-04 RX ADMIN — OXYCODONE HYDROCHLORIDE AND ACETAMINOPHEN 1 TABLET: 5; 325 TABLET ORAL at 17:48

## 2020-03-04 RX ADMIN — FLUOXETINE HYDROCHLORIDE 40 MG: 20 CAPSULE ORAL at 04:37

## 2020-03-04 RX ADMIN — SENNOSIDES AND DOCUSATE SODIUM 2 TABLET: 8.6; 5 TABLET ORAL at 04:37

## 2020-03-04 RX ADMIN — ASPIRIN 81 MG: 81 TABLET, COATED ORAL at 04:37

## 2020-03-04 RX ADMIN — ZIPRASIDONE HYDROCHLORIDE 40 MG: 40 CAPSULE ORAL at 17:49

## 2020-03-04 RX ADMIN — TRAZODONE HYDROCHLORIDE 50 MG: 100 TABLET ORAL at 17:48

## 2020-03-04 RX ADMIN — BUSPIRONE HYDROCHLORIDE 10 MG: 10 TABLET ORAL at 17:48

## 2020-03-04 RX ADMIN — OXCARBAZEPINE 150 MG: 150 TABLET, FILM COATED ORAL at 04:37

## 2020-03-04 RX ADMIN — PREGABALIN 300 MG: 100 CAPSULE ORAL at 04:37

## 2020-03-04 RX ADMIN — OXYCODONE HYDROCHLORIDE AND ACETAMINOPHEN 1 TABLET: 5; 325 TABLET ORAL at 12:23

## 2020-03-04 ASSESSMENT — PATIENT HEALTH QUESTIONNAIRE - PHQ9
5. POOR APPETITE OR OVEREATING: NOT AT ALL
1. LITTLE INTEREST OR PLEASURE IN DOING THINGS: NOT AT ALL
4. FEELING TIRED OR HAVING LITTLE ENERGY: SEVERAL DAYS
SUM OF ALL RESPONSES TO PHQ QUESTIONS 1-9: 2
7. TROUBLE CONCENTRATING ON THINGS, SUCH AS READING THE NEWSPAPER OR WATCHING TELEVISION: NOT AT ALL
2. FEELING DOWN, DEPRESSED, IRRITABLE, OR HOPELESS: SEVERAL DAYS
8. MOVING OR SPEAKING SO SLOWLY THAT OTHER PEOPLE COULD HAVE NOTICED. OR THE OPPOSITE, BEING SO FIGETY OR RESTLESS THAT YOU HAVE BEEN MOVING AROUND A LOT MORE THAN USUAL: NOT AT ALL
3. TROUBLE FALLING OR STAYING ASLEEP OR SLEEPING TOO MUCH: NOT AT ALL
SUM OF ALL RESPONSES TO PHQ9 QUESTIONS 1 AND 2: 1
9. THOUGHTS THAT YOU WOULD BE BETTER OFF DEAD, OR OF HURTING YOURSELF: NOT AT ALL
6. FEELING BAD ABOUT YOURSELF - OR THAT YOU ARE A FAILURE OR HAVE LET YOURSELF OR YOUR FAMILY DOWN: NOT AL ALL

## 2020-03-04 ASSESSMENT — FIBROSIS 4 INDEX: FIB4 SCORE: 0.36

## 2020-03-04 NOTE — CARE PLAN
Problem: Safety  Goal: Will remain free from injury  Outcome: PROGRESSING AS EXPECTED     Problem: Safety  Goal: Will remain free from falls  Outcome: PROGRESSING SLOWER THAN EXPECTED  Note: Fall this admission; educated patient regarding risk, call light within reach, near nurses station, frame alarm on, hourly rounding in place

## 2020-03-04 NOTE — THERAPY
Occupational Therapy Contact Note    Discharge planning care conference with patient, patient's mother, patient's grandmother, , RN, Dr. Uribe and Dr. Camarillo. This OT provided insight to skilled therapy's role in patient progress in relation to pt's multiple recent inpatient admissions and pt's recent month-long stay at SNF with OT and PT services. Patient and family report month-long skilled OT/PT at SNF was not effective in increasing patient's independence. Pt and family also endorse not following through with recommendations for home program exercise and additional activity outside of therapy sessions. Pt and family recognize pt is at her apparent baseline; at which pt is able to complete functional squat pivot transfers to and from her wheelchair and other surfaces (bed, commode, etc) with CGA-Angelita for safety. Pt has a personal wheelchair in which she can propel herself with her upper extremities. Consecutive inpatient OT and PT evaluations over many months of inpatient admissions repeatedly document inconsistent presentations of non-medically confirmed deficits, as well as self-limiting and helpless behaviors that primarily impact her independence in ADLs and functional mobility. Pt and family are in agreement that DC home with grandmother is not a safe option as grandmother does not feel she can provide the level of care/assist pt now requires at her current baseline. Pt would be best served by an agency that provides supervision and assist from trained caregivers.     The result of this care conference was pt and family agree to work with  in order to elect appropriate discharge plan to a facility that will address pts longer-term care needs. Acute OT will sign off.     DREAD Chapin/GIULIANO  pager# [phone]

## 2020-03-04 NOTE — PROGRESS NOTES
2 RN skin check with SONYA Blackwood    Bruising and blisters to abdomen  Healing LP site to lower back  BLE are edematous  Sacrum/buttocks pink and blanchable  ACE bandage wraps to BLE, removed and assessed, skin CDI    Encouraging mobility in bed, patient is able to turn self

## 2020-03-04 NOTE — PROGRESS NOTES
Monitor summary: SR 67-77, NJ 0.16, QRS 0.08, QT 0.40, with rare PVCs per strip from monitor room.

## 2020-03-04 NOTE — PROGRESS NOTES
Monitor Summary: Pt is in NSR with no ectopy.  HR 74- 93, MA 0.14, QRS 0.08, QT 0.40 per strip from monitor room.

## 2020-03-04 NOTE — PROGRESS NOTES
Daily Progress Note:     Date of Service: 3/4/2020  Primary Team: [facility] White Team   Attending: Kadeem Alvarenga M.D.   Senior Resident: Dr. Uribe  Intern: Dr. Camarillo  Contact:  [phone]    Chief Complaint:   Both leg weakness and numbness    Subjective:  Patient complaint of headache, belly ache, backache and ankle pain says she got Toradol and morphine last night which helped and slept, denies any fever, nausea, vomiting, urinary symptoms, bowel symptoms she thinks that she has a UTI.    Had a meeting  at 2 PM for discharge planning with patient in the presence of  Grandmother, Mother, SARA Pete, Nurse Maya, O therapist, Dr Uribe, Dr Camarillo. Dr Uribe explained what is the current condition of the patient. At this point, patient is deemed unsafe to go home due to risk of falls secondary to lack of sufficient home care. We discussed also a possibility of rehabilitation, however, her mother stated that right after finishing physical therapy the patient returns to her weaker state. Therefore, patient needs constant encouragement and supervision rather than short period skilled nursing facility. The options for patient are likely include long term placement, bridge to long term, or group home. Occupation therapist explained what they will do to strengthen her upper extremities so that she has better mobility, as well as these services can be done in outpatient as the long-term solution. Patient and her family agrees with the team and chose not to go home but to a facility,  Yanci RUIZ gave the brochures about the places where she can go. Family and patient has to decide the place to go, further discussion pending with CM toward acceptable discharge plan.    Consultants/Specialty:  Neurology  Psychiatric    Review of Systems:    Constitutional: Negative for fever and chills  Cardiovascular: Negative for chest pain and palpitation  Respiratory: Negative for cough and  hemoptysis  Gastrointestinal: Positive for abdominal pain, negative for nausea and vomiting  Genitourinary negative for dysuria and urgency  Musculoskeletal: Positive for ankle pain.  Neurological: Positive for headache, negative for dizziness  Psychiatric: Negative for SI/HI    Objective Data: Pleasant female lying in bed in no acute distress.    Physical Exam:   Vitals:   Temp:  [36.1 °C (97 °F)-36.6 °C (97.9 °F)] 36.6 °C (97.9 °F)  Pulse:  [66-83] 66  Resp:  [16] 16  BP: (107-121)/(45-62) 121/62  SpO2:  [94 %-98 %] 98 %     Constitutional: Obese female in no acute distress  HEENT: Normocephalic and atraumatic, mucous membrane pink and moist no scleral icterus  Neck supple normal range of motion  Cardiovascular: Normal rate, regular rhythm and normal heart sounds, no murmurs heard  Pulmonary/chest: Normal breath sounds no wheezing no crackles  Abdominal: Soft bowel sounds normal  non-tenderness  Musculoskeletal: Decreased strength in both lower limbs  Neurological: Alert, oriented no cranial nerve deficit  Skin: No rash noted  Psychiatric: Affect normal    Labs:   None  Imaging:   None    * Leg weakness, bilateral  Assessment & Plan  Patient caregiver states she is unable to provide safe transfers between chair and bed  PT states there is no more equipment they can provide to assist with it  It is likely that patient will require long term care facility placement  Following fall also associated with back pain with point tenderness and loss of sensation below knees.   CT spine ruled out any acute pathology   Patient unable to undergo MRI because of Stimulator in place.   Doppler JOSHUA  There is no evidence of major arterial disease demonstrated bilaterally.  neurology consult, appreciate recommendations  Psychiatric consult, appreciate recommendations  Q4 neuro checks.   Pain control for LBP  PT/OT consult for discharge planning      Intracranial pressure increased  Assessment & Plan  Has slowly worsening headache  with new black spot on right eye.   Patients doctor increased diamox dose to 3 times daily outpatient to monitor till Monday and to further increase if not improving .     Plan :  continue diamox 3 times daily.    TONYA (obstructive sleep apnea)- (present on admission)  Assessment & Plan  Not on CPAP at home. Patient has not followed up with doctor yet for the same.   Follow up outpatient.     DM (diabetes mellitus) (HCC)- (present on admission)  Assessment & Plan  Continue home meds.   DUlaglutide injection every Friday .   Diabetic diet.    Asthma- (present on admission)  Assessment & Plan  Continue home meds    Hypothyroidism- (present on admission)  Assessment & Plan  TSH 5.65  Continue levothyroxine

## 2020-03-05 LAB
ALBUMIN SERPL BCP-MCNC: 4 G/DL (ref 3.2–4.9)
ALBUMIN/GLOB SERPL: 2.1 G/DL
ALP SERPL-CCNC: 48 U/L (ref 30–99)
ALT SERPL-CCNC: 18 U/L (ref 2–50)
ANION GAP SERPL CALC-SCNC: 11 MMOL/L (ref 0–11.9)
AST SERPL-CCNC: 10 U/L (ref 12–45)
BASOPHILS # BLD AUTO: 0.5 % (ref 0–1.8)
BASOPHILS # BLD: 0.04 K/UL (ref 0–0.12)
BILIRUB SERPL-MCNC: 0.4 MG/DL (ref 0.1–1.5)
BUN SERPL-MCNC: 16 MG/DL (ref 8–22)
CALCIUM SERPL-MCNC: 8.8 MG/DL (ref 8.5–10.5)
CHLORIDE SERPL-SCNC: 113 MMOL/L (ref 96–112)
CO2 SERPL-SCNC: 17 MMOL/L (ref 20–33)
CREAT SERPL-MCNC: 0.9 MG/DL (ref 0.5–1.4)
EOSINOPHIL # BLD AUTO: 0.07 K/UL (ref 0–0.51)
EOSINOPHIL NFR BLD: 0.9 % (ref 0–6.9)
ERYTHROCYTE [DISTWIDTH] IN BLOOD BY AUTOMATED COUNT: 48.5 FL (ref 35.9–50)
GLOBULIN SER CALC-MCNC: 1.9 G/DL (ref 1.9–3.5)
GLUCOSE SERPL-MCNC: 93 MG/DL (ref 65–99)
HCT VFR BLD AUTO: 39.8 % (ref 37–47)
HGB BLD-MCNC: 13.1 G/DL (ref 12–16)
IMM GRANULOCYTES # BLD AUTO: 0.07 K/UL (ref 0–0.11)
IMM GRANULOCYTES NFR BLD AUTO: 0.9 % (ref 0–0.9)
LYMPHOCYTES # BLD AUTO: 1.62 K/UL (ref 1–4.8)
LYMPHOCYTES NFR BLD: 22 % (ref 22–41)
MAGNESIUM SERPL-MCNC: 1.9 MG/DL (ref 1.5–2.5)
MCH RBC QN AUTO: 30.7 PG (ref 27–33)
MCHC RBC AUTO-ENTMCNC: 32.9 G/DL (ref 33.6–35)
MCV RBC AUTO: 93.2 FL (ref 81.4–97.8)
MONOCYTES # BLD AUTO: 0.55 K/UL (ref 0–0.85)
MONOCYTES NFR BLD AUTO: 7.5 % (ref 0–13.4)
NEUTROPHILS # BLD AUTO: 5.03 K/UL (ref 2–7.15)
NEUTROPHILS NFR BLD: 68.2 % (ref 44–72)
NRBC # BLD AUTO: 0 K/UL
NRBC BLD-RTO: 0 /100 WBC
PHOSPHATE SERPL-MCNC: 3.7 MG/DL (ref 2.5–4.5)
PLATELET # BLD AUTO: 148 K/UL (ref 164–446)
PMV BLD AUTO: 11.8 FL (ref 9–12.9)
POTASSIUM SERPL-SCNC: 3.3 MMOL/L (ref 3.6–5.5)
PROT SERPL-MCNC: 5.9 G/DL (ref 6–8.2)
RBC # BLD AUTO: 4.27 M/UL (ref 4.2–5.4)
SODIUM SERPL-SCNC: 141 MMOL/L (ref 135–145)
WBC # BLD AUTO: 7.4 K/UL (ref 4.8–10.8)

## 2020-03-05 PROCEDURE — 700102 HCHG RX REV CODE 250 W/ 637 OVERRIDE(OP): Performed by: PSYCHIATRY & NEUROLOGY

## 2020-03-05 PROCEDURE — 700102 HCHG RX REV CODE 250 W/ 637 OVERRIDE(OP): Performed by: STUDENT IN AN ORGANIZED HEALTH CARE EDUCATION/TRAINING PROGRAM

## 2020-03-05 PROCEDURE — A9270 NON-COVERED ITEM OR SERVICE: HCPCS | Performed by: INTERNAL MEDICINE

## 2020-03-05 PROCEDURE — 700101 HCHG RX REV CODE 250: Performed by: INTERNAL MEDICINE

## 2020-03-05 PROCEDURE — A9270 NON-COVERED ITEM OR SERVICE: HCPCS | Performed by: STUDENT IN AN ORGANIZED HEALTH CARE EDUCATION/TRAINING PROGRAM

## 2020-03-05 PROCEDURE — 700111 HCHG RX REV CODE 636 W/ 250 OVERRIDE (IP): Performed by: STUDENT IN AN ORGANIZED HEALTH CARE EDUCATION/TRAINING PROGRAM

## 2020-03-05 PROCEDURE — 84100 ASSAY OF PHOSPHORUS: CPT

## 2020-03-05 PROCEDURE — G0378 HOSPITAL OBSERVATION PER HR: HCPCS

## 2020-03-05 PROCEDURE — 80053 COMPREHEN METABOLIC PANEL: CPT

## 2020-03-05 PROCEDURE — 96372 THER/PROPH/DIAG INJ SC/IM: CPT

## 2020-03-05 PROCEDURE — 36415 COLL VENOUS BLD VENIPUNCTURE: CPT

## 2020-03-05 PROCEDURE — 99214 OFFICE O/P EST MOD 30 MIN: CPT | Mod: GC | Performed by: PSYCHIATRY & NEUROLOGY

## 2020-03-05 PROCEDURE — 99225 PR SUBSEQUENT OBSERVATION CARE,LEVEL II: CPT | Mod: GC | Performed by: INTERNAL MEDICINE

## 2020-03-05 PROCEDURE — 700102 HCHG RX REV CODE 250 W/ 637 OVERRIDE(OP): Performed by: INTERNAL MEDICINE

## 2020-03-05 PROCEDURE — 85025 COMPLETE CBC W/AUTO DIFF WBC: CPT

## 2020-03-05 PROCEDURE — 83735 ASSAY OF MAGNESIUM: CPT

## 2020-03-05 PROCEDURE — A9270 NON-COVERED ITEM OR SERVICE: HCPCS | Performed by: PSYCHIATRY & NEUROLOGY

## 2020-03-05 PROCEDURE — 96376 TX/PRO/DX INJ SAME DRUG ADON: CPT | Mod: XU

## 2020-03-05 PROCEDURE — 700111 HCHG RX REV CODE 636 W/ 250 OVERRIDE (IP): Performed by: INTERNAL MEDICINE

## 2020-03-05 RX ORDER — DIPHENHYDRAMINE HCL 25 MG
50 TABLET ORAL ONCE
Status: COMPLETED | OUTPATIENT
Start: 2020-03-05 | End: 2020-03-05

## 2020-03-05 RX ORDER — SULFAMETHOXAZOLE AND TRIMETHOPRIM 800; 160 MG/1; MG/1
1 TABLET ORAL EVERY 12 HOURS
Status: COMPLETED | OUTPATIENT
Start: 2020-03-05 | End: 2020-03-07

## 2020-03-05 RX ORDER — KETOROLAC TROMETHAMINE 30 MG/ML
15 INJECTION, SOLUTION INTRAMUSCULAR; INTRAVENOUS ONCE
Status: COMPLETED | OUTPATIENT
Start: 2020-03-05 | End: 2020-03-05

## 2020-03-05 RX ORDER — POTASSIUM CHLORIDE 20 MEQ/1
40 TABLET, EXTENDED RELEASE ORAL ONCE
Status: COMPLETED | OUTPATIENT
Start: 2020-03-05 | End: 2020-03-05

## 2020-03-05 RX ADMIN — TRAZODONE HYDROCHLORIDE 50 MG: 100 TABLET ORAL at 17:24

## 2020-03-05 RX ADMIN — KETOROLAC TROMETHAMINE 15 MG: 30 INJECTION, SOLUTION INTRAMUSCULAR; INTRAVENOUS at 07:32

## 2020-03-05 RX ADMIN — BUSPIRONE HYDROCHLORIDE 10 MG: 10 TABLET ORAL at 17:23

## 2020-03-05 RX ADMIN — MYCOPHENOLATE MOFETIL 1000 MG: 250 CAPSULE ORAL at 17:23

## 2020-03-05 RX ADMIN — ZIPRASIDONE HYDROCHLORIDE 40 MG: 40 CAPSULE ORAL at 17:24

## 2020-03-05 RX ADMIN — LIDOCAINE 1 PATCH: 50 PATCH TOPICAL at 04:17

## 2020-03-05 RX ADMIN — DIPHENHYDRAMINE HYDROCHLORIDE 50 MG: 25 TABLET ORAL at 11:05

## 2020-03-05 RX ADMIN — ACETAZOLAMIDE 1000 MG: 500 CAPSULE, EXTENDED RELEASE ORAL at 04:17

## 2020-03-05 RX ADMIN — OXCARBAZEPINE 150 MG: 150 TABLET, FILM COATED ORAL at 17:25

## 2020-03-05 RX ADMIN — BUSPIRONE HYDROCHLORIDE 10 MG: 10 TABLET ORAL at 04:16

## 2020-03-05 RX ADMIN — BUDESONIDE AND FORMOTEROL FUMARATE DIHYDRATE 2 PUFF: 160; 4.5 AEROSOL RESPIRATORY (INHALATION) at 04:15

## 2020-03-05 RX ADMIN — OXYCODONE HYDROCHLORIDE AND ACETAMINOPHEN 1 TABLET: 5; 325 TABLET ORAL at 20:10

## 2020-03-05 RX ADMIN — POTASSIUM CHLORIDE 40 MEQ: 1500 TABLET, EXTENDED RELEASE ORAL at 11:05

## 2020-03-05 RX ADMIN — POLYETHYLENE GLYCOL (3350) 1 PACKET: 17 POWDER, FOR SOLUTION ORAL at 17:23

## 2020-03-05 RX ADMIN — PREGABALIN 300 MG: 100 CAPSULE ORAL at 04:16

## 2020-03-05 RX ADMIN — BUDESONIDE AND FORMOTEROL FUMARATE DIHYDRATE 2 PUFF: 160; 4.5 AEROSOL RESPIRATORY (INHALATION) at 17:23

## 2020-03-05 RX ADMIN — OXYCODONE HYDROCHLORIDE AND ACETAMINOPHEN 1 TABLET: 5; 325 TABLET ORAL at 04:17

## 2020-03-05 RX ADMIN — LEVOTHYROXINE SODIUM 100 MCG: 100 TABLET ORAL at 04:15

## 2020-03-05 RX ADMIN — SENNOSIDES AND DOCUSATE SODIUM 2 TABLET: 8.6; 5 TABLET ORAL at 04:16

## 2020-03-05 RX ADMIN — SULFAMETHOXAZOLE AND TRIMETHOPRIM 1 TABLET: 800; 160 TABLET ORAL at 06:29

## 2020-03-05 RX ADMIN — OXYCODONE HYDROCHLORIDE AND ACETAMINOPHEN 1 TABLET: 5; 325 TABLET ORAL at 14:11

## 2020-03-05 RX ADMIN — FLUOXETINE HYDROCHLORIDE 40 MG: 20 CAPSULE ORAL at 04:17

## 2020-03-05 RX ADMIN — ZIPRASIDONE HYDROCHLORIDE 40 MG: 40 CAPSULE ORAL at 04:15

## 2020-03-05 RX ADMIN — ALBUTEROL SULFATE 2 PUFF: 90 AEROSOL, METERED RESPIRATORY (INHALATION) at 23:02

## 2020-03-05 RX ADMIN — ACETAMINOPHEN 650 MG: 325 TABLET, FILM COATED ORAL at 07:32

## 2020-03-05 RX ADMIN — PREDNISONE 7.5 MG: 5 TABLET ORAL at 04:16

## 2020-03-05 RX ADMIN — PREGABALIN 300 MG: 100 CAPSULE ORAL at 17:24

## 2020-03-05 RX ADMIN — ENOXAPARIN SODIUM 40 MG: 100 INJECTION SUBCUTANEOUS at 04:17

## 2020-03-05 RX ADMIN — SULFAMETHOXAZOLE AND TRIMETHOPRIM 1 TABLET: 800; 160 TABLET ORAL at 17:24

## 2020-03-05 RX ADMIN — ACETAZOLAMIDE 1000 MG: 500 CAPSULE, EXTENDED RELEASE ORAL at 17:24

## 2020-03-05 RX ADMIN — OXCARBAZEPINE 150 MG: 150 TABLET, FILM COATED ORAL at 04:17

## 2020-03-05 RX ADMIN — SENNOSIDES AND DOCUSATE SODIUM 2 TABLET: 8.6; 5 TABLET ORAL at 17:24

## 2020-03-05 RX ADMIN — ASPIRIN 81 MG: 81 TABLET, COATED ORAL at 04:16

## 2020-03-05 RX ADMIN — MYCOPHENOLATE MOFETIL 1000 MG: 250 CAPSULE ORAL at 04:16

## 2020-03-05 ASSESSMENT — ENCOUNTER SYMPTOMS
FALLS: 1
INSOMNIA: 1
WEAKNESS: 1
BLURRED VISION: 0
BRUISES/BLEEDS EASILY: 0
COUGH: 0
FEVER: 0
DEPRESSION: 1
SORE THROAT: 0
HALLUCINATIONS: 0
HEADACHES: 1
NAUSEA: 0

## 2020-03-05 ASSESSMENT — LIFESTYLE VARIABLES: SUBSTANCE_ABUSE: 0

## 2020-03-05 NOTE — PROGRESS NOTES
Monitor summary: SR 64-72, DE 0.18, QRS 0.10, QT 0.40 with occasional PVC/rare PAC per strip from monitor room.

## 2020-03-05 NOTE — PROGRESS NOTES
Daily Progress Note:     Date of Service: 3/5/2020  Primary Team: [facility] White Team   Attending: Kadeem Alvarenga M.D.   Senior Resident: Dr. Uribe  Intern: Dr. Camarillo  Contact:  [phone]    Chief Complaint:   Bilateral leg weakness and numbness    Subjective:  Patient complains of discomfort in the lower abdomen and whooshing sound in the head from side to side denies any fever, nausea, vomiting.    Consultants/Specialty:  Neurology  Psychiatric    Review of Systems:    Constitutional: Negative for fever and chills  Cardiovascular: Negative for chest pain and palpitation  Respiratory: Negative for cough and hemoptysis  Gastrointestinal: Positive for abdominal pain, negative for nausea and vomiting  Genitourinary: Positive for dysuria negative for hematuria  Musculoskeletal: Positive for ankle pain  Neurological: Positive for headache, negative for dizziness  Psychiatric: Negative for SI/HI    Objective Data: Pleasant female lying in bed in no acute distress  Physical Exam:   Vitals:   Temp:  [36.1 °C (97 °F)-36.4 °C (97.6 °F)] 36.4 °C (97.5 °F)  Pulse:  [63-79] 63  Resp:  [16] 16  BP: ()/(52-62) 112/57  SpO2:  [92 %-98 %] 98 %    Constitutional: Obese female in no acute distress  HEENT: Normocephalic, atraumatic, mucous membrane pink and moist, no scleral icterus  Neck: Supple normal range of motion  Cardiovascular: Normal rate, regular rhythm and normal heart sounds, no murmur heard  Pulmonary: Air entry good bilaterally no wheezing no crackles  Abdominal: Soft, mild tenderness in suprapubic area, bowel sounds normal  Musculoskeletal: Decreased in the in both lower limbs  Neurological: Alert, oriented no cranial nerve deficits  Skin: No rash noted  Psychiatric: Affect normal    Labs:   UA: Character cloudy, nitrite positive, leukocyte esterase small, WBC 20-50, bacteria many.    Imaging:   None    * Leg weakness, bilateral  Assessment & Plan  Patient caregiver states she is unable to provide  safe transfers between chair and bed  PT states there is no more equipment they can provide to assist with it  It is likely that patient will require long term care facility placement  CT spine ruled out any acute pathology   Patient unable to undergo MRI because of Stimulator in place.   Doppler JOSHUA  There is no evidence of major arterial disease demonstrated bilaterally.  neurology consult, appreciate recommendations  Psychiatric consult, appreciate recommendations  Q4 neuro checks.   Pain control for LBP  Pending placement      Intracranial pressure increased  Assessment & Plan  Headache with whooshing sounds  Plan :  continue diamox 3 times daily.    TONYA (obstructive sleep apnea)- (present on admission)  Assessment & Plan  Not on CPAP at home. Patient has not followed up with doctor yet for the same.   Follow up outpatient.     UTI (urinary tract infection)  Assessment & Plan  History of dysuria,  UA: Cloudy urine, nitrite positive, leukocyte esterase small, WBC 20-50, bacteria many  Urine culture pending  Plan: For Bactrim    DM (diabetes mellitus) (HCC)- (present on admission)  Assessment & Plan  Continue home meds.   DUlaglutide injection every Friday .   Diabetic diet.    Asthma- (present on admission)  Assessment & Plan  Continue home meds    Hypothyroidism- (present on admission)  Assessment & Plan  TSH 5.65  Continue levothyroxine

## 2020-03-05 NOTE — PROGRESS NOTES
2 RN skin check with SONYA Green    Bruising and blisters to abdomen  Healing LP site to lower back, MARK  BLE edematous  Sacrum and buttocks intact  Heels and elbows intact    Encouraging mobility in bed, turns self

## 2020-03-05 NOTE — DISCHARGE PLANNING
Anticipated Discharge Disposition:   Long Term Placement    Action:   RN CM, patient, grandmother, and mother met to complete assessment as well as begin talks of discharge planning.    Dr. Uribe called for family care conference to discuss the discharge plan.    Patient has been to many facilities, home health care, and some psych facilities in past.  Patient final discharge disposition would always be her grandmother's home.  Patient's grandmother can not provide care needed for patient.      RN CM discussed with family various optionse of places for patient with long term being end goal.  Patient and family declined group home placement.  Patient states group home can not provide the care needed or be able to afford.  Education provided; family and patient are now accepting to group home.    RN CM also discussed psych issues that are barriers to patient getting accepted to facilities.      RN CM notified that patient will not be accepted to [facility].  Per Nilsa with [facility], patient would receive wound care for 2 weeks, healing occurs and patient begins to pick at wound causing the wound care to start over and begin care again.    Barriers to Discharge:   Limited acceptance to SNF  Psych issues not addressed by family    Plan:   RN CM contacted [facility] to begin the process for long term placement with insurance company.   Group home with Medicaid waiver in works possibly.  SONYA RUIZ to contact Mita with [facility] for pricing, bed availability, and confirming acceptance of Medicaid [place].

## 2020-03-05 NOTE — CARE PLAN
Problem: Safety  Goal: Will remain free from injury  Outcome: PROGRESSING AS EXPECTED     Problem: Bowel/Gastric:  Goal: Normal bowel function is maintained or improved  Outcome: PROGRESSING AS EXPECTED     Problem: Knowledge Deficit  Goal: Knowledge of disease process/condition, treatment plan, diagnostic tests, and medications will improve  Outcome: PROGRESSING AS EXPECTED  Note: Family meeting today, updated on POC, all questions answered at this time

## 2020-03-05 NOTE — ASSESSMENT & PLAN NOTE
History of dysuria,  UA: Cloudy urine, nitrite positive, leukocyte esterase small, WBC 20-50, bacteria many  Urine culture: Lactose fermenting Gram negative zita   >100,000 cfu/mL -Klebsiella pneumoniae sensitive to bactrim.  Got Bactrim for 3 days  - UA 03/11/20 with trace LE, negative nitrite and mod epithelial cell, low WBC, likely skin letty contaminate. Will wait for rflx cx

## 2020-03-06 PROCEDURE — A9270 NON-COVERED ITEM OR SERVICE: HCPCS | Performed by: INTERNAL MEDICINE

## 2020-03-06 PROCEDURE — 700111 HCHG RX REV CODE 636 W/ 250 OVERRIDE (IP): Performed by: STUDENT IN AN ORGANIZED HEALTH CARE EDUCATION/TRAINING PROGRAM

## 2020-03-06 PROCEDURE — A9270 NON-COVERED ITEM OR SERVICE: HCPCS | Performed by: PSYCHIATRY & NEUROLOGY

## 2020-03-06 PROCEDURE — 700102 HCHG RX REV CODE 250 W/ 637 OVERRIDE(OP): Performed by: PSYCHIATRY & NEUROLOGY

## 2020-03-06 PROCEDURE — 96372 THER/PROPH/DIAG INJ SC/IM: CPT

## 2020-03-06 PROCEDURE — G0378 HOSPITAL OBSERVATION PER HR: HCPCS

## 2020-03-06 PROCEDURE — 700111 HCHG RX REV CODE 636 W/ 250 OVERRIDE (IP): Performed by: INTERNAL MEDICINE

## 2020-03-06 PROCEDURE — 700101 HCHG RX REV CODE 250: Performed by: INTERNAL MEDICINE

## 2020-03-06 PROCEDURE — 700102 HCHG RX REV CODE 250 W/ 637 OVERRIDE(OP): Performed by: INTERNAL MEDICINE

## 2020-03-06 PROCEDURE — 700102 HCHG RX REV CODE 250 W/ 637 OVERRIDE(OP): Performed by: STUDENT IN AN ORGANIZED HEALTH CARE EDUCATION/TRAINING PROGRAM

## 2020-03-06 PROCEDURE — A9270 NON-COVERED ITEM OR SERVICE: HCPCS | Performed by: STUDENT IN AN ORGANIZED HEALTH CARE EDUCATION/TRAINING PROGRAM

## 2020-03-06 PROCEDURE — 99225 PR SUBSEQUENT OBSERVATION CARE,LEVEL II: CPT | Mod: GC | Performed by: INTERNAL MEDICINE

## 2020-03-06 RX ADMIN — ZIPRASIDONE HYDROCHLORIDE 40 MG: 40 CAPSULE ORAL at 17:25

## 2020-03-06 RX ADMIN — ACETAZOLAMIDE 1000 MG: 500 CAPSULE, EXTENDED RELEASE ORAL at 04:42

## 2020-03-06 RX ADMIN — BUSPIRONE HYDROCHLORIDE 10 MG: 10 TABLET ORAL at 17:25

## 2020-03-06 RX ADMIN — BUSPIRONE HYDROCHLORIDE 10 MG: 10 TABLET ORAL at 04:53

## 2020-03-06 RX ADMIN — SULFAMETHOXAZOLE AND TRIMETHOPRIM 1 TABLET: 800; 160 TABLET ORAL at 04:43

## 2020-03-06 RX ADMIN — ACETAMINOPHEN 650 MG: 325 TABLET, FILM COATED ORAL at 21:57

## 2020-03-06 RX ADMIN — ENOXAPARIN SODIUM 40 MG: 100 INJECTION SUBCUTANEOUS at 04:43

## 2020-03-06 RX ADMIN — PREGABALIN 300 MG: 100 CAPSULE ORAL at 04:42

## 2020-03-06 RX ADMIN — BUDESONIDE AND FORMOTEROL FUMARATE DIHYDRATE 2 PUFF: 160; 4.5 AEROSOL RESPIRATORY (INHALATION) at 04:43

## 2020-03-06 RX ADMIN — ZIPRASIDONE HYDROCHLORIDE 40 MG: 40 CAPSULE ORAL at 04:42

## 2020-03-06 RX ADMIN — LEVOTHYROXINE SODIUM 100 MCG: 100 TABLET ORAL at 04:43

## 2020-03-06 RX ADMIN — LIDOCAINE 1 PATCH: 50 PATCH TOPICAL at 10:50

## 2020-03-06 RX ADMIN — SENNOSIDES AND DOCUSATE SODIUM 2 TABLET: 8.6; 5 TABLET ORAL at 17:25

## 2020-03-06 RX ADMIN — BUDESONIDE AND FORMOTEROL FUMARATE DIHYDRATE 2 PUFF: 160; 4.5 AEROSOL RESPIRATORY (INHALATION) at 17:26

## 2020-03-06 RX ADMIN — OXYCODONE HYDROCHLORIDE AND ACETAMINOPHEN 1 TABLET: 5; 325 TABLET ORAL at 07:26

## 2020-03-06 RX ADMIN — SULFAMETHOXAZOLE AND TRIMETHOPRIM 1 TABLET: 800; 160 TABLET ORAL at 17:25

## 2020-03-06 RX ADMIN — OXCARBAZEPINE 150 MG: 150 TABLET, FILM COATED ORAL at 04:43

## 2020-03-06 RX ADMIN — MYCOPHENOLATE MOFETIL 1000 MG: 250 CAPSULE ORAL at 21:57

## 2020-03-06 RX ADMIN — FLUOXETINE HYDROCHLORIDE 40 MG: 20 CAPSULE ORAL at 04:43

## 2020-03-06 RX ADMIN — PREDNISONE 7.5 MG: 5 TABLET ORAL at 04:43

## 2020-03-06 RX ADMIN — MYCOPHENOLATE MOFETIL 1000 MG: 250 CAPSULE ORAL at 10:51

## 2020-03-06 RX ADMIN — OXCARBAZEPINE 150 MG: 150 TABLET, FILM COATED ORAL at 17:25

## 2020-03-06 RX ADMIN — ACETAZOLAMIDE 1000 MG: 500 CAPSULE, EXTENDED RELEASE ORAL at 17:25

## 2020-03-06 RX ADMIN — ASPIRIN 81 MG: 81 TABLET, COATED ORAL at 04:43

## 2020-03-06 RX ADMIN — OXYCODONE HYDROCHLORIDE AND ACETAMINOPHEN 1 TABLET: 5; 325 TABLET ORAL at 17:25

## 2020-03-06 RX ADMIN — PREGABALIN 300 MG: 100 CAPSULE ORAL at 17:25

## 2020-03-06 RX ADMIN — OXYCODONE HYDROCHLORIDE AND ACETAMINOPHEN 1 TABLET: 5; 325 TABLET ORAL at 12:22

## 2020-03-06 RX ADMIN — TRAZODONE HYDROCHLORIDE 50 MG: 100 TABLET ORAL at 17:25

## 2020-03-06 NOTE — PROGRESS NOTES
2300: Patient experienced sudden wheezing and having shortness of breath. O2 sats 98%. Sat patient up in bed and provided PRN albuterol. No O2 required. SOB improved, patient now resting comfortably in bed.    0100: Patient reports she feels hot, temperature taken - afebrile. Ice packs provided for comfort. Patient worried that she may be having an allergic reaction. No hives or redness, no difficulty swallowing, or changes in VS. This RN offered to call MD for benadryl, patient refused. No further actions.

## 2020-03-06 NOTE — PROGRESS NOTES
2 RN skin check with SONYA Green    Bruising to abdomen  Healing LP site to back, MARK  BLE edematous  Sacrum and buttocks pink - barrier cream applied and pads changed to reduce moisture exposure    Encouraging mobility in bed

## 2020-03-06 NOTE — PSYCHIATRY
"PSYCHIATRIC FOLLOW-UP:(established)  *Reason for admission:  Ground level fall.  *Legal Hold Status on Admission:   No legal hold.  Supervising Psychiatrist:          Dr. Sabillon    *HPI:          Patient is a 29 year old female with history of schizoaffective disorder, depression, EVELIA, and borderline personality disorder who presented to the ED after a ground level fall. She reports feeling \"So depressed,\" in part due to her decision yesterday to live in long term care rather than physical therapy and returning home. She said her cat  in her arms 10 days ago, and since her grandfather was also dying, \"there is nothing there for me at home.\" She said she is working with Marylin, her , in applying for the long term care facility. She reports continued insomnia and felt that she slept better with Trazodone at 100mg. She also said she sleeps during the day out of boredom. She reports anhedonia and said that her appetite is \"not good.\" She denies side effects of medications. She reports feeling that she would be \"better off dead\" at times, but denies intent, plan, researching or rehearsal. She reports having continued weakness and a sense of hopelessness that her physical condition will improve.     Medical ROS (as pertinent):                    Review of Systems   Constitutional: Negative for fever.   HENT: Negative for sore throat.    Eyes: Negative for blurred vision.   Respiratory: Negative for cough.    Cardiovascular: Negative for chest pain.   Gastrointestinal: Negative for nausea.   Genitourinary: Negative for dysuria.   Musculoskeletal: Positive for falls.   Skin: Negative for rash.   Neurological: Positive for weakness and headaches.   Endo/Heme/Allergies: Does not bruise/bleed easily.   Psychiatric/Behavioral: Positive for depression. Negative for hallucinations and substance abuse. The patient has insomnia.        *Psychiatric Examination:  Vitals:    20 1933   BP: (!) 99/40   Pulse: 83 " "  Resp: 18   Temp: 36.2 °C (97.2 °F)   SpO2: 96%     General Appearance:  Patient appears stated age, fairly kempt, fair hygiene, lying in a hospital bed.   Abnormal Movements:  No tremor or dystonia noted.  Gait and Posture:  Could not assess.  Speech:  Normal tone, volume, and rate. Spontaneous and on pressured.  Thought processes: Linear and goal directed.  Associations: No loose or clang associations.   Abnormal or Psychotic Thoughts: Denies AH, VH, paranoia, and delusions. Does not appear to be responding to internal stimuli.  Judgement and Insight: Poor / Poor   Orientation:  AAOx3  Recent and Remote Memory:  Grossly intact.  Attention Span and Concentration: Grossly intact.  Language:  Fluent in English  Fund of Knowledge: Adequate.   Mood and Affect: \"So depressed.\"   SI/HI: Denies SI, Denies HI.     *ASSESSMENT:  Patient is a 29 year old female with history of schizoaffective disorder, depression, EVELIA, and borderline personality disorder who presented to the ED after a ground level fall. Patient continues to report significant depressive symptoms and may benefit from increased fluoxetine to 60mg, attending physician will make final determination, see her forthcoming note. Patient also reports insomnia, however this is likely exacerbated by her sleeping much of the day. She requested an increase in Trazodone, but was overly sedated when taking 100mg po qhs in the past. Patient will be counseled on improving sleep hygiene and increasing daytime activities. Patient has a recent TSH of 5.65. Thought her T4 was WNL, there is evidence that treating TSH to below 2.0 has benefit in depression. She does not represent a significant risk of harm to self and does not meet criteria for legal hold at this time.     Dx:   -Functional Neurological Disorder With weakness   -Schizoaffective disorder  -Generalized anxiety  -Borderline personality disorder     Medical:   -History of transverse myelitis  -Chronic pain " syndrome  -GERD  -Diabetes mellitus type 2  -Obstructive sleep apnea  -Hypothyroidism, on Synthroid  -Increased intracranial pressure    PLAN:  -Patient does not meet criteria for legal hold.  -Continue BuSpar 10 mg twice daily for anxiety  -Will consider increasing fluoxetine to 60 mg daily for mood  -Continue Trileptal 150 mg twice daily for mood  -Continue trazodone 50 mg nightly for insomnia  -Recommend considering increase in Levothyroxine.  -Continue Geodon 40 mg twice daily for psychosis  - Reviewed risks, benefits, alternatives to include no treatment, therapy and medications  - Will continue to follow.    Thank you for the Consult.

## 2020-03-06 NOTE — PROGRESS NOTES
Daily Progress Note:     Date of Service: 3/6/2020  Primary Team: [facility] White Team   Attending: Kadeem Alvarenga M.D.   Senior Resident: Dr. Uribe  Intern: Dr. Camarillo  Contact:  [phone]    Chief Complaint:   Both leg weakness and numbness    Subjective:  Patient says is feeling better today denies any fever, nausea, vomiting or headache also decreased she did not complaint of any abdominal pain today.  But she had itching of the body and feel hot yesterday after she get Benadryl she is okay.    Consultants/Specialty:  Neurology  Psychiatric    Review of Systems:    Constitutional: Negative for fever and chills  Cardiovascular: Negative for chest pain and palpitation  Respiratory: Negative for cough and hemoptysis  Gastrointestinal: Negative for abdominal pain, nausea and vomiting  Genitourinary: Negative for dysuria and hematuria  Musculoskeletal: Negative for joint pains  Neurological: Positive for mild headache, negative for dizziness  Psychiatric: Negative for SI/HI    Objective Data: Pleasant female lying in bed in no acute distress waiting for placement  Physical Exam:   Vitals:   Temp:  [36.2 °C (97.2 °F)-36.7 °C (98.1 °F)] 36.5 °C (97.7 °F)  Pulse:  [75-84] 77  Resp:  [16-19] 16  BP: ()/(40-73) 105/53  SpO2:  [93 %-97 %] 97 %     Physical Exam  Vitals signs and nursing note reviewed.   Constitutional:       General: She is not in acute distress.     Appearance: She is obese.   HENT:      Head: Atraumatic.      Nose: Nose normal. No congestion.      Mouth/Throat:      Mouth: Mucous membranes are moist.      Pharynx: Oropharynx is clear.   Eyes:      General: No scleral icterus.     Extraocular Movements: Extraocular movements intact.      Pupils: Pupils are equal, round, and reactive to light.   Neck:      Musculoskeletal: Normal range of motion and neck supple. No neck rigidity.   Cardiovascular:      Rate and Rhythm: Normal rate and regular rhythm.      Pulses: Normal pulses.       Heart sounds: No murmur.   Pulmonary:      Effort: Pulmonary effort is normal. No respiratory distress.      Breath sounds: Normal breath sounds. No wheezing or rales.   Abdominal:      General: Bowel sounds are normal. There is no distension.      Palpations: There is no mass.   Musculoskeletal:         General: No swelling or tenderness.   Skin:     General: Skin is warm.      Coloration: Skin is not jaundiced.   Neurological:      General: No focal deficit present.      Mental Status: She is alert. She is disoriented.      Cranial Nerves: No cranial nerve deficit.      Motor: Weakness present.      Comments: Strength Decreased in both lower limbs   Psychiatric:         Mood and Affect: Mood normal.           Labs:   Recent Labs     03/05/20  0629   WBC 7.4   RBC 4.27   HEMOGLOBIN 13.1   HEMATOCRIT 39.8   MCV 93.2   MCH 30.7   RDW 48.5   PLATELETCT 148*   MPV 11.8   NEUTSPOLYS 68.20   LYMPHOCYTES 22.00   MONOCYTES 7.50   EOSINOPHILS 0.90   BASOPHILS 0.50     Recent Labs     03/05/20  0629   SODIUM 141   POTASSIUM 3.3*   CHLORIDE 113*   CO2 17*   GLUCOSE 93   BUN 16     Recent Labs     03/05/20  0629   ALBUMIN 4.0   TBILIRUBIN 0.4   ALKPHOSPHAT 48   TOTPROTEIN 5.9*   ALTSGPT 18   ASTSGOT 10*   CREATININE 0.90     Imaging:   None.    * Leg weakness, bilateral  Assessment & Plan  Patient caregiver states she is unable to provide safe transfers between chair and bed  PT states there is no more equipment they can provide to assist with it  It is likely that patient will require long term care facility placement  CT spine ruled out any acute pathology   Patient unable to undergo MRI because of Stimulator in place.   Doppler JOSHUA  There is no evidence of major arterial disease demonstrated bilaterally.  neurology consult, appreciate recommendations  Psychiatric consult, appreciate recommendations  Q4 neuro checks.   Pain control for LBP  Pending placement      Intracranial pressure increased  Assessment & Plan  Headache  with whooshing sounds  Plan :  continue diamox 3 times daily.    TONYA (obstructive sleep apnea)- (present on admission)  Assessment & Plan  Not on CPAP at home. Patient has not followed up with doctor yet for the same.   Follow up outpatient.     UTI (urinary tract infection)  Assessment & Plan  History of dysuria,  UA: Cloudy urine, nitrite positive, leukocyte esterase small, WBC 20-50, bacteria many  Plan: continue Bactrim    DM (diabetes mellitus) ([facility])- (present on admission)  Assessment & Plan  Continue home meds.   DUlaglutide injection every Friday .   Diabetic diet.    Asthma- (present on admission)  Assessment & Plan  Continue home meds    Hypothyroidism- (present on admission)  Assessment & Plan  TSH 5.65  Increased Levothyroxine to 100mcg on 3/3  Continue levothyroxine

## 2020-03-07 PROCEDURE — 700101 HCHG RX REV CODE 250: Performed by: INTERNAL MEDICINE

## 2020-03-07 PROCEDURE — A9270 NON-COVERED ITEM OR SERVICE: HCPCS | Performed by: INTERNAL MEDICINE

## 2020-03-07 PROCEDURE — 700111 HCHG RX REV CODE 636 W/ 250 OVERRIDE (IP): Performed by: INTERNAL MEDICINE

## 2020-03-07 PROCEDURE — G0378 HOSPITAL OBSERVATION PER HR: HCPCS

## 2020-03-07 PROCEDURE — 700102 HCHG RX REV CODE 250 W/ 637 OVERRIDE(OP): Performed by: STUDENT IN AN ORGANIZED HEALTH CARE EDUCATION/TRAINING PROGRAM

## 2020-03-07 PROCEDURE — 99225 PR SUBSEQUENT OBSERVATION CARE,LEVEL II: CPT | Mod: GC | Performed by: INTERNAL MEDICINE

## 2020-03-07 PROCEDURE — A9270 NON-COVERED ITEM OR SERVICE: HCPCS | Performed by: PSYCHIATRY & NEUROLOGY

## 2020-03-07 PROCEDURE — 96372 THER/PROPH/DIAG INJ SC/IM: CPT

## 2020-03-07 PROCEDURE — A9270 NON-COVERED ITEM OR SERVICE: HCPCS | Performed by: STUDENT IN AN ORGANIZED HEALTH CARE EDUCATION/TRAINING PROGRAM

## 2020-03-07 PROCEDURE — 700102 HCHG RX REV CODE 250 W/ 637 OVERRIDE(OP): Performed by: INTERNAL MEDICINE

## 2020-03-07 PROCEDURE — 700111 HCHG RX REV CODE 636 W/ 250 OVERRIDE (IP): Performed by: STUDENT IN AN ORGANIZED HEALTH CARE EDUCATION/TRAINING PROGRAM

## 2020-03-07 PROCEDURE — 700102 HCHG RX REV CODE 250 W/ 637 OVERRIDE(OP): Performed by: PSYCHIATRY & NEUROLOGY

## 2020-03-07 RX ADMIN — LIDOCAINE 1 PATCH: 50 PATCH TOPICAL at 06:02

## 2020-03-07 RX ADMIN — ACETAMINOPHEN 650 MG: 325 TABLET, FILM COATED ORAL at 07:52

## 2020-03-07 RX ADMIN — BUSPIRONE HYDROCHLORIDE 10 MG: 10 TABLET ORAL at 05:59

## 2020-03-07 RX ADMIN — BUSPIRONE HYDROCHLORIDE 10 MG: 10 TABLET ORAL at 18:08

## 2020-03-07 RX ADMIN — ACETAZOLAMIDE 1000 MG: 500 CAPSULE, EXTENDED RELEASE ORAL at 06:00

## 2020-03-07 RX ADMIN — FLUOXETINE HYDROCHLORIDE 40 MG: 20 CAPSULE ORAL at 05:59

## 2020-03-07 RX ADMIN — POLYETHYLENE GLYCOL (3350) 1 PACKET: 17 POWDER, FOR SOLUTION ORAL at 18:13

## 2020-03-07 RX ADMIN — PREGABALIN 300 MG: 100 CAPSULE ORAL at 06:00

## 2020-03-07 RX ADMIN — OXCARBAZEPINE 150 MG: 150 TABLET, FILM COATED ORAL at 05:59

## 2020-03-07 RX ADMIN — BUDESONIDE AND FORMOTEROL FUMARATE DIHYDRATE 2 PUFF: 160; 4.5 AEROSOL RESPIRATORY (INHALATION) at 05:59

## 2020-03-07 RX ADMIN — OXYCODONE HYDROCHLORIDE AND ACETAMINOPHEN 1 TABLET: 5; 325 TABLET ORAL at 12:11

## 2020-03-07 RX ADMIN — ZIPRASIDONE HYDROCHLORIDE 40 MG: 40 CAPSULE ORAL at 18:15

## 2020-03-07 RX ADMIN — BUDESONIDE AND FORMOTEROL FUMARATE DIHYDRATE 2 PUFF: 160; 4.5 AEROSOL RESPIRATORY (INHALATION) at 18:00

## 2020-03-07 RX ADMIN — ASPIRIN 81 MG: 81 TABLET, COATED ORAL at 06:00

## 2020-03-07 RX ADMIN — ALBUTEROL SULFATE 2 PUFF: 90 AEROSOL, METERED RESPIRATORY (INHALATION) at 18:15

## 2020-03-07 RX ADMIN — ACETAMINOPHEN 650 MG: 325 TABLET, FILM COATED ORAL at 14:55

## 2020-03-07 RX ADMIN — SULFAMETHOXAZOLE AND TRIMETHOPRIM 1 TABLET: 800; 160 TABLET ORAL at 05:59

## 2020-03-07 RX ADMIN — SENNOSIDES AND DOCUSATE SODIUM 2 TABLET: 8.6; 5 TABLET ORAL at 05:59

## 2020-03-07 RX ADMIN — SULFAMETHOXAZOLE AND TRIMETHOPRIM 1 TABLET: 800; 160 TABLET ORAL at 18:08

## 2020-03-07 RX ADMIN — OXYCODONE HYDROCHLORIDE AND ACETAMINOPHEN 1 TABLET: 5; 325 TABLET ORAL at 05:59

## 2020-03-07 RX ADMIN — ONDANSETRON 4 MG: 4 TABLET, ORALLY DISINTEGRATING ORAL at 17:23

## 2020-03-07 RX ADMIN — TRAZODONE HYDROCHLORIDE 50 MG: 100 TABLET ORAL at 18:08

## 2020-03-07 RX ADMIN — PREDNISONE 7.5 MG: 5 TABLET ORAL at 06:00

## 2020-03-07 RX ADMIN — OXCARBAZEPINE 150 MG: 150 TABLET, FILM COATED ORAL at 18:09

## 2020-03-07 RX ADMIN — PREGABALIN 300 MG: 100 CAPSULE ORAL at 18:09

## 2020-03-07 RX ADMIN — SENNOSIDES AND DOCUSATE SODIUM 2 TABLET: 8.6; 5 TABLET ORAL at 18:09

## 2020-03-07 RX ADMIN — MYCOPHENOLATE MOFETIL 1000 MG: 250 CAPSULE ORAL at 18:09

## 2020-03-07 RX ADMIN — ZIPRASIDONE HYDROCHLORIDE 40 MG: 40 CAPSULE ORAL at 05:59

## 2020-03-07 RX ADMIN — LEVOTHYROXINE SODIUM 100 MCG: 100 TABLET ORAL at 05:59

## 2020-03-07 RX ADMIN — ACETAZOLAMIDE 1000 MG: 500 CAPSULE, EXTENDED RELEASE ORAL at 18:08

## 2020-03-07 RX ADMIN — MYCOPHENOLATE MOFETIL 1000 MG: 250 CAPSULE ORAL at 05:59

## 2020-03-07 RX ADMIN — PROMETHAZINE HYDROCHLORIDE 25 MG: 25 SUPPOSITORY RECTAL at 22:19

## 2020-03-07 RX ADMIN — ENOXAPARIN SODIUM 40 MG: 100 INJECTION SUBCUTANEOUS at 06:00

## 2020-03-07 ASSESSMENT — FIBROSIS 4 INDEX: FIB4 SCORE: 0.48

## 2020-03-07 NOTE — PROGRESS NOTES
Daily Progress Note:     Date of Service: 3/7/2020  Primary Team: [facility] White Team   Attending: Kadeem Alvarenga M.D.   Senior Resident: Dr. Uribe  Intern: Dr. Camarillo  Contact:  [phone]    Chief Complaint:   Both leg weakness and numbness after a fall.    Subjective:  Patient saying she is feeling better dysuria improved, no headache, nausea, vomiting, abdominal pain.    Urine Culture:Lactose fermenting Gram negative zita   >100,000 cfu/mL     Consultants/Specialty:  Neurology  Psychiatric    Review of Systems:    Constitutional: Negative for fever and chills  Cardiovascular: Negative for chest pain and palpitation  Respiratory: Negative for cough and hemoptysis  Gastrointestinal: Negative for abdominal pain, nausea and vomiting  Genitourinary: Negative for dysuria and hematuria  Musculoskeletal: Negative for joint pains  Neurological: Positive for mild headache, negative for dizziness  Psychiatric: Negative for SI/HI    Objective Data:   Physical Exam:   Vitals:   Temp:  [36.1 °C (96.9 °F)-36.4 °C (97.6 °F)] 36.4 °C (97.6 °F)  Pulse:  [73-82] 82  Resp:  [16-17] 16  BP: (102-114)/(47-56) 105/56  SpO2:  [94 %-96 %] 95 %    Constitutional: Obese female in no acute distress  HEENT: Normocephalic, atraumatic, mucous membrane pink and moist, no scleral icterus  Neck: Supple normal range of motion  Cardiovascular: Normal rate, regular rhythm and normal heart sounds, no murmur heard  Pulmonary: Air entry good bilaterally no wheezing no crackles  Abdominal: Soft, mild tenderness in suprapubic area, bowel sounds normal  Musculoskeletal: Decreased strength in both lower limbs  Neurological: Alert, oriented no cranial nerve deficits  Skin: No rash noted  Psychiatric: Affect normal       Labs:   none  Imaging:   none    * Leg weakness, bilateral  Assessment & Plan  Patient caregiver states she is unable to provide safe transfers between chair and bed  PT states there is no more equipment they can provide to  assist with it  It is likely that patient will require long term care facility placement  CT spine ruled out any acute pathology   Patient unable to undergo MRI because of Stimulator in place.   Doppler JOSHUA  There is no evidence of major arterial disease demonstrated bilaterally.  neurology consult, appreciate recommendations  Psychiatric consult, appreciate recommendations  Q4 neuro checks.   Pain control for LBP  Pending placement      Intracranial pressure increased  Assessment & Plan  Headache with whooshing sounds  Plan :  continue diamox 3 times daily.    TONYA (obstructive sleep apnea)- (present on admission)  Assessment & Plan  Not on CPAP at home. Patient has not followed up with doctor yet for the same.   Follow up outpatient.     UTI (urinary tract infection)  Assessment & Plan  History of dysuria,  UA: Cloudy urine, nitrite positive, leukocyte esterase small, WBC 20-50, bacteria many  Urine culture: Lactose fermenting Gram negative zita   >100,000 cfu/mL   Plan: continue Bactrim    DM (diabetes mellitus) (HCC)- (present on admission)  Assessment & Plan  Continue home meds.   DUlaglutide injection every Friday .   Diabetic diet.    Asthma- (present on admission)  Assessment & Plan  Continue home meds    Hypothyroidism- (present on admission)  Assessment & Plan  TSH 5.65  Increased Levothyroxine to 100mcg on 3/3  Continue levothyroxine

## 2020-03-07 NOTE — CARE PLAN
Problem: Safety  Goal: Will remain free from injury  Outcome: PROGRESSING AS EXPECTED  Goal: Will remain free from falls  Outcome: PROGRESSING AS EXPECTED     Problem: Pain Management  Goal: Pain level will decrease to patient's comfort goal  Outcome: PROGRESSING AS EXPECTED   Call light within reach, pt uses call light. Bed alarm in use. Pt reports pain under control with medication management.

## 2020-03-07 NOTE — CARE PLAN
Problem: Safety  Goal: Will remain free from injury  Outcome: PROGRESSING AS EXPECTED  Note: Pt uses call light     Problem: Knowledge Deficit  Goal: Knowledge of disease process/condition, treatment plan, diagnostic tests, and medications will improve  Outcome: PROGRESSING AS EXPECTED  Note: Pt educated on POC

## 2020-03-07 NOTE — CONSULTS
DATE OF SERVICE:  03/04/2020    HISTORY OF PRESENT ILLNESS:  The patient was reevaluated at [facility] on 03/04/2020.  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  In addition, the patient also has idiopathic intracranial   hypertension unassociated with a papilledema.  The patient reports that her   headaches have slightly improved following treatment with Diamox 1500 mg p.o.   daily.    The patient was readmitted to [facility] with new-onset weakness   involving both lower extremities.  It is queried by general neurology whether   or not the patient has a conversion disorder.    The patient denies any change in central vision, color vision and peripheral   vision.  Currently, she is taking the Diamox 1500 mg p.o. daily.    ALLERGIES:  CEFDINIR, DEPAKOTE, ABILIFY, AMITRIPTYLINE, AMOXICILLIN,   CIPROFLOXACIN, CLINDAMYCIN, DOXYCYCLINE, ERYTHROMYCIN, FLAGYL, FLOMAX,   METFORMIN, SULFA MEDICATIONS, TAPE, VANCOMYCIN, KEFLEX, LEVOFLOXACIN AND   LEVAQUIN.    MEDICATIONS:  Diamox 1500 mg p.o. daily, prednisone 5 mg p.o. daily, CellCept   1000 mg p.o. b.i.d., fentanyl, Januvia, Gralise, baclofen, fluoxetine,   Corlanor, Percocet, Geodon, sodium bicarbonate.    PAST MEDICAL HISTORY:  Type 2 diabetes mellitus, Hashimoto's thyroiditis and   CRION disease.    PAST SURGICAL HISTORY:  Status post placement of an InterStim pacemaker   (implanted unilateral leads stimulating the S3 nerve root).    PAST FAMILY HISTORY:  Mother with a history of hypertension.    SOCIAL HISTORY:  Patient neither smokes nor drinks.    REVIEW OF SYSTEMS:  The patient has history of cardiac arrhythmia, chest pain,   tinnitus, arthritis, easy fatigability, shortness of breath, headache,   thyroid disease and diabetes mellitus.    VISUAL ACUITY:  Best corrected visual acuity, OD 20/20, OS 20/20.    NEAR VISUAL ACUITY:  OD J1 plus and  OS J1 plus.    Color vision correctly recognizing 5.5 HRR plates OD and 4.0 Ishihara plates   OS.    AMSLER GRID TESTING:  There was a waviness of grid markings, OU.    CONFRONTATION VISUAL FIELDS:  Full to finger counting and color comparison   bilaterally.    PUPILS:  Both pupils measured 3.0 mm.  Both pupils were briskly reactive to   light.    FLICKER FUSION:  27 Hz OD and 26 Hz OS.    SLIT LAMP EXAMINATION:  OD normal, OS normal.    TENSION:  OD 17, OS 20.    MOTILITY EXAMINATION:  There was a small angle esotropia.  Please refer to the   ___ notes for the motility measurements.    DILATED OPHTHALMOSCOPY:  The cup-to-disk ratio measured 0.5.  The right optic   nerve, macula and peripheral retina appeared normal.  There was a superior   peripapillary nevus.    OS cup-to-disk ratio measured 0.4.  The optic nerve, macula and peripheral   retina appeared normal.    CHRISTINA VISUAL FIELD:  30-2 OD.  The foveal threshold was reduced at 38 dB.    Just a minimal superior depression, OS, foveal threshold was normal at 38 dB,   the visual field was normal.    OCT:  The mean retinal nerve fiber layer thickness was normal, OD at 94   microns and OS at 97 microns.  The macular thickness maps were normal OU.    Fundus photography showed bilateral anomalous disks.  There was no evidence of   papilledema.    LID FUNCTION:  The palpebral fissures measured 6 mm OD and 7 mm OS.  There was   bilateral lid edema, dermatochalasis and brow ptosis.    MENTAL STATUS:  Her speech was fluent.  Insight, comprehension and judgment   were normal.    CRANIAL NERVES:  Corneal sensation and facial sensation were intact.  There   was symmetric eyelid closure and facial grimace.  Lower cranial nerves were   normal.    MOTOR EXAMINATION:  Strength, muscle tone, bulk were normal in both upper and   lower extremities.    Deltoid, 5 on the right, 5 on the left.    Biceps 5 and 5, 5 on the right, 5 on the left.  Triceps, 5 and 5.    Lower  extremities, hip flexors, 3-4- on the right, 3-4- on the left,   quadriceps 3 and 3, hamstrings 4 and 3.    ASSESSMENT:  1. Clinically stable, chronic relapsing inflammatory optic neuropathy (CRION   disease).  2. Remote acute retrobulbar optic neuritis.  3. Remote retrobulbar optic neuritis, OD.  4. Recurrent optic neuritis, OD, 09/08/2018.  5. Clinically stable idiopathic intracranial hypertension unassociated with   papilledema.  Patient complains of headaches, although has remained stable on   the higher dose of Diamox 1500 mg p.o. daily.  6. Glaucoma suspect.  7. Small-angle esotropia.  8. Remote history of paroxysmal atrial fibrillation.  9. Type 2 diabetes mellitus.  10. Undifferentiated autoimmune disease.  11. Obesity.  12. Status post lumbar laminectomy at the L4-L5 level, 2012.  13. History of bowel and bladder incontinence.  14. Status post placement of an InterStim pacemaker.  15. Dysmorphic facies.  16. Right common peroneal nerve palsy.  17. Peripapillary nevus OS, OD.  18. Nonsmoker.    RECOMMENDATIONS:  1. Increase the Diamox to 2000 mg p.o. daily, 1000 mg p.o. b.i.d.  2. Maintain the prednisone 5 mg p.o. daily.  3. Maintain the CellCept 1000 mg p.o. b.i.d.  4. Follow up in neurophthalmology clinic ___/2020.  5. Continue physical therapy and occupational therapy.  6. Follow up with general neurology regarding the weakness in both lower   extremities.             ____________________________________     MARITZA GROVER,     GLH / NTS    DD:  03/06/2020 19:17:38  DT:  03/06/2020 21:04:25    D#:  [id number]  Job#:  [id number]    cc: ERLINDA CALABRESE MD, RAGHU ALEX MD

## 2020-03-08 PROCEDURE — 700111 HCHG RX REV CODE 636 W/ 250 OVERRIDE (IP): Performed by: STUDENT IN AN ORGANIZED HEALTH CARE EDUCATION/TRAINING PROGRAM

## 2020-03-08 PROCEDURE — A9270 NON-COVERED ITEM OR SERVICE: HCPCS | Performed by: STUDENT IN AN ORGANIZED HEALTH CARE EDUCATION/TRAINING PROGRAM

## 2020-03-08 PROCEDURE — 96375 TX/PRO/DX INJ NEW DRUG ADDON: CPT | Mod: XU

## 2020-03-08 PROCEDURE — 700101 HCHG RX REV CODE 250: Performed by: INTERNAL MEDICINE

## 2020-03-08 PROCEDURE — 700102 HCHG RX REV CODE 250 W/ 637 OVERRIDE(OP): Performed by: STUDENT IN AN ORGANIZED HEALTH CARE EDUCATION/TRAINING PROGRAM

## 2020-03-08 PROCEDURE — 700102 HCHG RX REV CODE 250 W/ 637 OVERRIDE(OP): Performed by: INTERNAL MEDICINE

## 2020-03-08 PROCEDURE — 96372 THER/PROPH/DIAG INJ SC/IM: CPT

## 2020-03-08 PROCEDURE — 99224 PR SUBSEQUENT OBSERVATION CARE,LEVEL I: CPT | Mod: GC | Performed by: INTERNAL MEDICINE

## 2020-03-08 PROCEDURE — 700102 HCHG RX REV CODE 250 W/ 637 OVERRIDE(OP): Performed by: PSYCHIATRY & NEUROLOGY

## 2020-03-08 PROCEDURE — 700111 HCHG RX REV CODE 636 W/ 250 OVERRIDE (IP): Performed by: INTERNAL MEDICINE

## 2020-03-08 PROCEDURE — A9270 NON-COVERED ITEM OR SERVICE: HCPCS | Performed by: PSYCHIATRY & NEUROLOGY

## 2020-03-08 PROCEDURE — A9270 NON-COVERED ITEM OR SERVICE: HCPCS | Performed by: INTERNAL MEDICINE

## 2020-03-08 PROCEDURE — 94760 N-INVAS EAR/PLS OXIMETRY 1: CPT

## 2020-03-08 PROCEDURE — G0378 HOSPITAL OBSERVATION PER HR: HCPCS

## 2020-03-08 RX ORDER — IPRATROPIUM BROMIDE AND ALBUTEROL SULFATE 2.5; .5 MG/3ML; MG/3ML
3 SOLUTION RESPIRATORY (INHALATION)
Status: DISCONTINUED | OUTPATIENT
Start: 2020-03-08 | End: 2020-03-24 | Stop reason: HOSPADM

## 2020-03-08 RX ORDER — ONDANSETRON 2 MG/ML
4 INJECTION INTRAMUSCULAR; INTRAVENOUS ONCE
Status: COMPLETED | OUTPATIENT
Start: 2020-03-08 | End: 2020-03-08

## 2020-03-08 RX ADMIN — ZIPRASIDONE HYDROCHLORIDE 40 MG: 40 CAPSULE ORAL at 05:38

## 2020-03-08 RX ADMIN — ACETAMINOPHEN 650 MG: 325 TABLET, FILM COATED ORAL at 18:38

## 2020-03-08 RX ADMIN — OXYCODONE HYDROCHLORIDE AND ACETAMINOPHEN 1 TABLET: 5; 325 TABLET ORAL at 07:53

## 2020-03-08 RX ADMIN — ACETAZOLAMIDE 1000 MG: 500 CAPSULE, EXTENDED RELEASE ORAL at 17:38

## 2020-03-08 RX ADMIN — LEVOTHYROXINE SODIUM 100 MCG: 100 TABLET ORAL at 05:38

## 2020-03-08 RX ADMIN — LIDOCAINE 1 PATCH: 50 PATCH TOPICAL at 05:35

## 2020-03-08 RX ADMIN — BUDESONIDE AND FORMOTEROL FUMARATE DIHYDRATE 2 PUFF: 160; 4.5 AEROSOL RESPIRATORY (INHALATION) at 05:36

## 2020-03-08 RX ADMIN — OXCARBAZEPINE 150 MG: 150 TABLET, FILM COATED ORAL at 05:38

## 2020-03-08 RX ADMIN — OXCARBAZEPINE 150 MG: 150 TABLET, FILM COATED ORAL at 17:38

## 2020-03-08 RX ADMIN — IPRATROPIUM BROMIDE AND ALBUTEROL SULFATE 3 ML: .5; 3 SOLUTION RESPIRATORY (INHALATION) at 16:29

## 2020-03-08 RX ADMIN — ALBUTEROL SULFATE 2 PUFF: 90 AEROSOL, METERED RESPIRATORY (INHALATION) at 15:48

## 2020-03-08 RX ADMIN — MYCOPHENOLATE MOFETIL 1000 MG: 250 CAPSULE ORAL at 18:00

## 2020-03-08 RX ADMIN — FLUOXETINE HYDROCHLORIDE 40 MG: 20 CAPSULE ORAL at 05:39

## 2020-03-08 RX ADMIN — OXYCODONE HYDROCHLORIDE AND ACETAMINOPHEN 1 TABLET: 5; 325 TABLET ORAL at 20:00

## 2020-03-08 RX ADMIN — OXYCODONE HYDROCHLORIDE AND ACETAMINOPHEN 1 TABLET: 5; 325 TABLET ORAL at 12:46

## 2020-03-08 RX ADMIN — PREGABALIN 300 MG: 100 CAPSULE ORAL at 05:39

## 2020-03-08 RX ADMIN — TRAZODONE HYDROCHLORIDE 50 MG: 100 TABLET ORAL at 17:38

## 2020-03-08 RX ADMIN — PREGABALIN 300 MG: 100 CAPSULE ORAL at 17:38

## 2020-03-08 RX ADMIN — PREDNISONE 7.5 MG: 5 TABLET ORAL at 05:36

## 2020-03-08 RX ADMIN — ZIPRASIDONE HYDROCHLORIDE 40 MG: 40 CAPSULE ORAL at 17:38

## 2020-03-08 RX ADMIN — ONDANSETRON 4 MG: 2 INJECTION INTRAMUSCULAR; INTRAVENOUS at 07:54

## 2020-03-08 RX ADMIN — BUSPIRONE HYDROCHLORIDE 10 MG: 10 TABLET ORAL at 17:38

## 2020-03-08 RX ADMIN — MYCOPHENOLATE MOFETIL 1000 MG: 250 CAPSULE ORAL at 06:07

## 2020-03-08 RX ADMIN — ACETAZOLAMIDE 1000 MG: 500 CAPSULE, EXTENDED RELEASE ORAL at 05:38

## 2020-03-08 RX ADMIN — SENNOSIDES AND DOCUSATE SODIUM 2 TABLET: 8.6; 5 TABLET ORAL at 05:42

## 2020-03-08 RX ADMIN — SENNOSIDES AND DOCUSATE SODIUM 2 TABLET: 8.6; 5 TABLET ORAL at 17:38

## 2020-03-08 RX ADMIN — POLYETHYLENE GLYCOL (3350) 1 PACKET: 17 POWDER, FOR SOLUTION ORAL at 17:38

## 2020-03-08 RX ADMIN — ASPIRIN 81 MG: 81 TABLET, COATED ORAL at 05:40

## 2020-03-08 RX ADMIN — BUSPIRONE HYDROCHLORIDE 10 MG: 10 TABLET ORAL at 05:39

## 2020-03-08 RX ADMIN — ENOXAPARIN SODIUM 40 MG: 100 INJECTION SUBCUTANEOUS at 05:36

## 2020-03-08 RX ADMIN — ONDANSETRON 4 MG: 4 TABLET, ORALLY DISINTEGRATING ORAL at 14:37

## 2020-03-08 RX ADMIN — BUDESONIDE AND FORMOTEROL FUMARATE DIHYDRATE 2 PUFF: 160; 4.5 AEROSOL RESPIRATORY (INHALATION) at 18:00

## 2020-03-08 ASSESSMENT — COPD QUESTIONNAIRES
COPD SCREENING SCORE: 2
HAVE YOU SMOKED AT LEAST 100 CIGARETTES IN YOUR ENTIRE LIFE: NO/DON'T KNOW
DO YOU EVER COUGH UP ANY MUCUS OR PHLEGM?: YES, A FEW DAYS A WEEK OR MONTH
DURING THE PAST 4 WEEKS HOW MUCH DID YOU FEEL SHORT OF BREATH: SOME OF THE TIME

## 2020-03-08 NOTE — CARE PLAN
Problem: Safety  Goal: Will remain free from injury  Outcome: PROGRESSING AS EXPECTED  Goal: Will remain free from falls  Outcome: PROGRESSING AS EXPECTED     Problem: Bowel/Gastric:  Goal: Normal bowel function is maintained or improved  Outcome: PROGRESSING AS EXPECTED  Goal: Will not experience complications related to bowel motility  Outcome: PROGRESSING AS EXPECTED   Fall precautions in place. Pt shows ability to utilize call button. Bed alarm in use. Pt had large BM yesterday. Bms regular for patient.

## 2020-03-08 NOTE — PROGRESS NOTES
Daily Progress Note:     Date of Service: 3/8/2020  Primary Team: [employer] [facility] White Team   Attending: Kadeem Alvarenga M.D.   Senior Resident: Dr. Uribe  Intern: Dr. Camarillo  Contact:  [phone]    Chief Complaint:   Bilateral lower limb weakness/numbness      Subjective:  Overnight patient had complaint of nausea and she did not eat her dinner also, got Zofran per rectum she says it didn't stayed she passed it.  Her headache is getting better, no urinary symptoms, no pain in the legs, denies any fever, chills, vomiting, chest pain, shortness of breath, palpitations.    Urine culture: Lactose fermenting Gram negative zita   >100,000 cfu/mL -Klebsiella pneumoniae sensitive to bactrim.  Got Bactrim for 3 days.    Consultants/Specialty:  Neurology  Psychiatric    Review of Systems:   Constitutional: Negative for fever and chills  Cardiovascular: Negative for chest pain and palpitations  Respiratory: Negative for cough and hemoptysis  Gastrointestinal: Positive for nausea, negative for abdominal pain, vomiting  Genitourinary: Negative for dysuria and hematuria  Musculoskeletal: Negative for joint pains  Neurological: Positive for mild headache, negative for dizziness  Psychiatric: Negative for SI/HI    Objective Data: Pleasant female in no acute distress sleeping but arousable  Physical Exam:   Vitals:   Temp:  [36.2 °C (97.1 °F)-36.4 °C (97.6 °F)] 36.2 °C (97.1 °F)  Pulse:  [67-82] 67  Resp:  [15-18] 16  BP: ()/(50-58) 100/56  SpO2:  [95 %-98 %] 95 %    Constitutional: Obese female in no acute distress  HEENT: Normocephalic, atraumatic, mucous membrane pink and moist, no scleral icterus  Neck: Supple normal range of motion  Cardiovascular: S1-S2 normal no heart murmurs  Pulmonary: Air entry good bilateral, no wheezing and crackles  Abdominal: Soft, no tenderness, bowel sounds normal  Musculoskeletal: Decreased strength in both lower limbs  Neurological: Alert, oriented, no cranial nerve deficits  Skin: No  rash noted  Psychiatric: Affect normal    Labs:   None  Imaging:   none    * Leg weakness, bilateral  Assessment & Plan  Patient caregiver states she is unable to provide safe transfers between chair and bed  PT states there is no more equipment they can provide to assist with it  It is likely that patient will require long term care facility placement  CT spine ruled out any acute pathology   Patient unable to undergo MRI because of Stimulator in place.   Doppler JOSHUA  There is no evidence of major arterial disease demonstrated bilaterally.  neurology consult, appreciate recommendations  Psychiatric consult, appreciate recommendations  Q4 neuro checks.   Pain control for LBP  Pending placement      Intracranial pressure increased  Assessment & Plan  Headache with whooshing sounds  Plan :  continue diamox 3 times daily.    TONYA (obstructive sleep apnea)- (present on admission)  Assessment & Plan  Not on CPAP at home. Patient has not followed up with doctor yet for the same.   Follow up outpatient.     UTI (urinary tract infection)  Assessment & Plan  History of dysuria,  UA: Cloudy urine, nitrite positive, leukocyte esterase small, WBC 20-50, bacteria many  Urine culture: Lactose fermenting Gram negative zita   >100,000 cfu/mL -Klebsiella pneumoniae sensitive to bactrim.  Got Bactrim for 3 days.    DM (diabetes mellitus) (HCC)- (present on admission)  Assessment & Plan  Continue home meds.   DUlaglutide injection every Friday .   Diabetic diet.    Asthma- (present on admission)  Assessment & Plan  Continue home meds    Hypothyroidism- (present on admission)  Assessment & Plan  TSH 5.65  Increased Levothyroxine to 100mcg on 3/3  Continue levothyroxine

## 2020-03-09 LAB
ALBUMIN SERPL BCP-MCNC: 4 G/DL (ref 3.2–4.9)
ALBUMIN/GLOB SERPL: 2.2 G/DL
ALP SERPL-CCNC: 45 U/L (ref 30–99)
ALT SERPL-CCNC: 17 U/L (ref 2–50)
ANION GAP SERPL CALC-SCNC: 11 MMOL/L (ref 0–11.9)
AST SERPL-CCNC: 9 U/L (ref 12–45)
BASOPHILS # BLD AUTO: 0.4 % (ref 0–1.8)
BASOPHILS # BLD: 0.02 K/UL (ref 0–0.12)
BILIRUB SERPL-MCNC: 0.4 MG/DL (ref 0.1–1.5)
BUN SERPL-MCNC: 15 MG/DL (ref 8–22)
CALCIUM SERPL-MCNC: 9 MG/DL (ref 8.5–10.5)
CHLORIDE SERPL-SCNC: 114 MMOL/L (ref 96–112)
CO2 SERPL-SCNC: 15 MMOL/L (ref 20–33)
CREAT SERPL-MCNC: 0.88 MG/DL (ref 0.5–1.4)
EOSINOPHIL # BLD AUTO: 0.08 K/UL (ref 0–0.51)
EOSINOPHIL NFR BLD: 1.6 % (ref 0–6.9)
ERYTHROCYTE [DISTWIDTH] IN BLOOD BY AUTOMATED COUNT: 48.5 FL (ref 35.9–50)
GLOBULIN SER CALC-MCNC: 1.8 G/DL (ref 1.9–3.5)
GLUCOSE SERPL-MCNC: 90 MG/DL (ref 65–99)
HCT VFR BLD AUTO: 37.4 % (ref 37–47)
HGB BLD-MCNC: 12.4 G/DL (ref 12–16)
IMM GRANULOCYTES # BLD AUTO: 0.02 K/UL (ref 0–0.11)
IMM GRANULOCYTES NFR BLD AUTO: 0.4 % (ref 0–0.9)
LYMPHOCYTES # BLD AUTO: 1.83 K/UL (ref 1–4.8)
LYMPHOCYTES NFR BLD: 35.5 % (ref 22–41)
MAGNESIUM SERPL-MCNC: 2 MG/DL (ref 1.5–2.5)
MCH RBC QN AUTO: 30.8 PG (ref 27–33)
MCHC RBC AUTO-ENTMCNC: 33.2 G/DL (ref 33.6–35)
MCV RBC AUTO: 93 FL (ref 81.4–97.8)
MONOCYTES # BLD AUTO: 0.36 K/UL (ref 0–0.85)
MONOCYTES NFR BLD AUTO: 7 % (ref 0–13.4)
NEUTROPHILS # BLD AUTO: 2.84 K/UL (ref 2–7.15)
NEUTROPHILS NFR BLD: 55.1 % (ref 44–72)
NRBC # BLD AUTO: 0 K/UL
NRBC BLD-RTO: 0 /100 WBC
PHOSPHATE SERPL-MCNC: 3.7 MG/DL (ref 2.5–4.5)
PLATELET # BLD AUTO: 133 K/UL (ref 164–446)
PMV BLD AUTO: 12.1 FL (ref 9–12.9)
POTASSIUM SERPL-SCNC: 3.5 MMOL/L (ref 3.6–5.5)
PROT SERPL-MCNC: 5.8 G/DL (ref 6–8.2)
RBC # BLD AUTO: 4.02 M/UL (ref 4.2–5.4)
SODIUM SERPL-SCNC: 140 MMOL/L (ref 135–145)
WBC # BLD AUTO: 5.2 K/UL (ref 4.8–10.8)

## 2020-03-09 PROCEDURE — 94640 AIRWAY INHALATION TREATMENT: CPT

## 2020-03-09 PROCEDURE — 99225 PR SUBSEQUENT OBSERVATION CARE,LEVEL II: CPT | Mod: GC | Performed by: INTERNAL MEDICINE

## 2020-03-09 PROCEDURE — A9270 NON-COVERED ITEM OR SERVICE: HCPCS | Performed by: PSYCHIATRY & NEUROLOGY

## 2020-03-09 PROCEDURE — 700102 HCHG RX REV CODE 250 W/ 637 OVERRIDE(OP): Performed by: STUDENT IN AN ORGANIZED HEALTH CARE EDUCATION/TRAINING PROGRAM

## 2020-03-09 PROCEDURE — 80053 COMPREHEN METABOLIC PANEL: CPT

## 2020-03-09 PROCEDURE — 700101 HCHG RX REV CODE 250: Performed by: INTERNAL MEDICINE

## 2020-03-09 PROCEDURE — 85025 COMPLETE CBC W/AUTO DIFF WBC: CPT

## 2020-03-09 PROCEDURE — 700102 HCHG RX REV CODE 250 W/ 637 OVERRIDE(OP): Performed by: INTERNAL MEDICINE

## 2020-03-09 PROCEDURE — 94760 N-INVAS EAR/PLS OXIMETRY 1: CPT

## 2020-03-09 PROCEDURE — 96372 THER/PROPH/DIAG INJ SC/IM: CPT

## 2020-03-09 PROCEDURE — A9270 NON-COVERED ITEM OR SERVICE: HCPCS | Performed by: STUDENT IN AN ORGANIZED HEALTH CARE EDUCATION/TRAINING PROGRAM

## 2020-03-09 PROCEDURE — 83735 ASSAY OF MAGNESIUM: CPT

## 2020-03-09 PROCEDURE — 84100 ASSAY OF PHOSPHORUS: CPT

## 2020-03-09 PROCEDURE — G0378 HOSPITAL OBSERVATION PER HR: HCPCS

## 2020-03-09 PROCEDURE — 700111 HCHG RX REV CODE 636 W/ 250 OVERRIDE (IP): Performed by: INTERNAL MEDICINE

## 2020-03-09 PROCEDURE — 700102 HCHG RX REV CODE 250 W/ 637 OVERRIDE(OP): Performed by: PSYCHIATRY & NEUROLOGY

## 2020-03-09 PROCEDURE — A9270 NON-COVERED ITEM OR SERVICE: HCPCS | Performed by: INTERNAL MEDICINE

## 2020-03-09 PROCEDURE — 700111 HCHG RX REV CODE 636 W/ 250 OVERRIDE (IP): Performed by: STUDENT IN AN ORGANIZED HEALTH CARE EDUCATION/TRAINING PROGRAM

## 2020-03-09 PROCEDURE — 700101 HCHG RX REV CODE 250: Performed by: STUDENT IN AN ORGANIZED HEALTH CARE EDUCATION/TRAINING PROGRAM

## 2020-03-09 PROCEDURE — 36415 COLL VENOUS BLD VENIPUNCTURE: CPT

## 2020-03-09 RX ORDER — LIDOCAINE 50 MG/G
1 PATCH TOPICAL EVERY 24 HOURS
Status: DISCONTINUED | OUTPATIENT
Start: 2020-03-09 | End: 2020-03-20

## 2020-03-09 RX ORDER — PREDNISONE 5 MG/1
5 TABLET ORAL DAILY
Status: DISCONTINUED | OUTPATIENT
Start: 2020-03-10 | End: 2020-03-22

## 2020-03-09 RX ADMIN — SENNOSIDES AND DOCUSATE SODIUM 2 TABLET: 8.6; 5 TABLET ORAL at 04:19

## 2020-03-09 RX ADMIN — LIDOCAINE 1 PATCH: 50 PATCH TOPICAL at 04:16

## 2020-03-09 RX ADMIN — ACETAZOLAMIDE 1000 MG: 500 CAPSULE, EXTENDED RELEASE ORAL at 04:20

## 2020-03-09 RX ADMIN — BUSPIRONE HYDROCHLORIDE 10 MG: 10 TABLET ORAL at 04:21

## 2020-03-09 RX ADMIN — OXCARBAZEPINE 150 MG: 150 TABLET, FILM COATED ORAL at 04:23

## 2020-03-09 RX ADMIN — LIDOCAINE 1 PATCH: 50 PATCH TOPICAL at 13:08

## 2020-03-09 RX ADMIN — PREGABALIN 300 MG: 100 CAPSULE ORAL at 17:39

## 2020-03-09 RX ADMIN — ALBUTEROL SULFATE 2.5 MG: 2.5 SOLUTION RESPIRATORY (INHALATION) at 11:41

## 2020-03-09 RX ADMIN — PREDNISONE 7.5 MG: 5 TABLET ORAL at 04:22

## 2020-03-09 RX ADMIN — BUSPIRONE HYDROCHLORIDE 10 MG: 10 TABLET ORAL at 17:40

## 2020-03-09 RX ADMIN — ALBUTEROL SULFATE 2.5 MG: 2.5 SOLUTION RESPIRATORY (INHALATION) at 07:24

## 2020-03-09 RX ADMIN — OXCARBAZEPINE 150 MG: 150 TABLET, FILM COATED ORAL at 17:39

## 2020-03-09 RX ADMIN — MYCOPHENOLATE MOFETIL 1000 MG: 250 CAPSULE ORAL at 17:39

## 2020-03-09 RX ADMIN — OXYCODONE HYDROCHLORIDE AND ACETAMINOPHEN 1 TABLET: 5; 325 TABLET ORAL at 08:28

## 2020-03-09 RX ADMIN — ALBUTEROL SULFATE 2.5 MG: 2.5 SOLUTION RESPIRATORY (INHALATION) at 19:55

## 2020-03-09 RX ADMIN — BUDESONIDE AND FORMOTEROL FUMARATE DIHYDRATE 2 PUFF: 160; 4.5 AEROSOL RESPIRATORY (INHALATION) at 18:00

## 2020-03-09 RX ADMIN — ZIPRASIDONE HYDROCHLORIDE 40 MG: 40 CAPSULE ORAL at 17:39

## 2020-03-09 RX ADMIN — OXYCODONE HYDROCHLORIDE AND ACETAMINOPHEN 1 TABLET: 5; 325 TABLET ORAL at 00:06

## 2020-03-09 RX ADMIN — POLYETHYLENE GLYCOL (3350) 1 PACKET: 17 POWDER, FOR SOLUTION ORAL at 04:19

## 2020-03-09 RX ADMIN — SENNOSIDES AND DOCUSATE SODIUM 2 TABLET: 8.6; 5 TABLET ORAL at 17:39

## 2020-03-09 RX ADMIN — BUDESONIDE AND FORMOTEROL FUMARATE DIHYDRATE 2 PUFF: 160; 4.5 AEROSOL RESPIRATORY (INHALATION) at 04:17

## 2020-03-09 RX ADMIN — ZIPRASIDONE HYDROCHLORIDE 40 MG: 40 CAPSULE ORAL at 04:24

## 2020-03-09 RX ADMIN — ACETAZOLAMIDE 1000 MG: 500 CAPSULE, EXTENDED RELEASE ORAL at 17:39

## 2020-03-09 RX ADMIN — OXYCODONE HYDROCHLORIDE AND ACETAMINOPHEN 1 TABLET: 5; 325 TABLET ORAL at 15:25

## 2020-03-09 RX ADMIN — TRAZODONE HYDROCHLORIDE 50 MG: 100 TABLET ORAL at 17:40

## 2020-03-09 RX ADMIN — ENOXAPARIN SODIUM 40 MG: 100 INJECTION SUBCUTANEOUS at 04:18

## 2020-03-09 RX ADMIN — ACETAMINOPHEN 650 MG: 325 TABLET, FILM COATED ORAL at 13:09

## 2020-03-09 RX ADMIN — FLUOXETINE HYDROCHLORIDE 40 MG: 20 CAPSULE ORAL at 04:23

## 2020-03-09 RX ADMIN — MYCOPHENOLATE MOFETIL 1000 MG: 250 CAPSULE ORAL at 04:21

## 2020-03-09 RX ADMIN — ALBUTEROL SULFATE 2.5 MG: 2.5 SOLUTION RESPIRATORY (INHALATION) at 16:50

## 2020-03-09 RX ADMIN — LEVOTHYROXINE SODIUM 100 MCG: 100 TABLET ORAL at 04:23

## 2020-03-09 RX ADMIN — ONDANSETRON 4 MG: 4 TABLET, ORALLY DISINTEGRATING ORAL at 07:16

## 2020-03-09 RX ADMIN — PREGABALIN 300 MG: 100 CAPSULE ORAL at 04:24

## 2020-03-09 RX ADMIN — OXYCODONE HYDROCHLORIDE AND ACETAMINOPHEN 1 TABLET: 5; 325 TABLET ORAL at 04:13

## 2020-03-09 RX ADMIN — OXYCODONE HYDROCHLORIDE AND ACETAMINOPHEN 1 TABLET: 5; 325 TABLET ORAL at 20:16

## 2020-03-09 RX ADMIN — ASPIRIN 81 MG: 81 TABLET, COATED ORAL at 04:22

## 2020-03-09 NOTE — CARE PLAN
Problem: Safety  Goal: Will remain free from injury  Outcome: PROGRESSING AS EXPECTED  Goal: Will remain free from falls  Outcome: PROGRESSING AS EXPECTED     Problem: Venous Thromboembolism (VTW)/Deep Vein Thrombosis (DVT) Prevention:  Goal: Patient will participate in Venous Thrombosis (VTE)/Deep Vein Thrombosis (DVT)Prevention Measures  Outcome: PROGRESSING AS EXPECTED   Call light within reach, bed alarm in use, fall precautions in place.

## 2020-03-09 NOTE — PROGRESS NOTES
Daily Progress Note:     Date of Service: 3/9/2020  Primary Team: [employer] [facility] White Team   Attending: Snoal Witt M.D.   Senior Resident: Dr. Wells  Intern: Dr. Goodson  Contact:  [phone]    Chief Complaint:   Bilateral lower limb weakness/numbness    Subjective:     No acute overnight event. Afebrile.   Patient continues to report bilateral lower extremity numbness and weakness. She report she slipped off her bed from sitting position yesterday and landed on her bilateral knee and right elbow, pain worsen with movement of all three joints, and upon palpation. Right knee worse with left knee discussed with patient to continue conservative treatment with ice and initiate lidocaine patch.         Consultants/Specialty:  Neurology  Psychiatry  Review of Systems:    Constitutional: Negative for fever and chills  Cardiovascular: Negative for chest pain and palpitations  Respiratory: Negative for cough and hemoptysis  Gastrointestinal: Positive for nausea, negative for abdominal pain, vomiting  Genitourinary: Negative for dysuria and hematuria  Musculoskeletal: Reports joint pain, in bilateral knee and right elbow  Neurological: Positive for mild headache, negative for dizziness, no visual changes  Psychiatric: Negative for SI/HI  Objective Data:   Physical Exam:   Vitals:   Temp:  [36.2 °C (97.2 °F)-37 °C (98.6 °F)] 36.4 °C (97.6 °F)  Pulse:  [68-81] 81  Resp:  [16-20] 16  BP: (102-110)/(52-55) 110/52  SpO2:  [93 %-96 %] 94 %      Physical Exam  Constitutional:       General: She is not in acute distress.     Appearance: Normal appearance. She is obese.   HENT:      Head: Normocephalic and atraumatic.   Eyes:      Extraocular Movements: Extraocular movements intact.      Pupils: Pupils are equal, round, and reactive to light.   Neck:      Musculoskeletal: Normal range of motion and neck supple.   Cardiovascular:      Rate and Rhythm: Normal rate and regular rhythm.      Heart sounds: Normal heart sounds.   Pulmonary:       Effort: Pulmonary effort is normal. No respiratory distress.      Breath sounds: Normal breath sounds. No wheezing.   Abdominal:      General: Bowel sounds are normal. There is no distension.      Palpations: Abdomen is soft.   Musculoskeletal:         General: Tenderness present. No swelling or deformity.      Comments: ROM of bilateral knee and right elbow limited by pain  Chronic weakness in bilateral lower extremity  Soreness/ tenderness with palpation of R lateral proximal LE   Skin:     General: Skin is warm and dry.      Comments: Slight bruising distal to right knee, anteriorly   Neurological:      Mental Status: She is alert and oriented to person, place, and time. Mental status is at baseline.           Labs:     Recent Labs     03/09/20  0436   WBC 5.2   RBC 4.02*   HEMOGLOBIN 12.4   HEMATOCRIT 37.4   MCV 93.0   MCH 30.8   RDW 48.5   PLATELETCT 133*   MPV 12.1   NEUTSPOLYS 55.10   LYMPHOCYTES 35.50   MONOCYTES 7.00   EOSINOPHILS 1.60   BASOPHILS 0.40     Recent Labs     03/09/20  0436   SODIUM 140   POTASSIUM 3.5*   CHLORIDE 114*   CO2 15*   GLUCOSE 90   BUN 15       Imaging:    no new imaging    * Leg weakness, bilateral  Assessment & Plan  -Patient caregiver states she is unable to provide safe transfers between chair and bed  -PT states there is no more equipment they can provide to assist with it  -It is likely that patient will require long term care facility placement  -CT spine ruled out any acute pathology   -Patient unable to undergo MRI because of Stimulator in place.     Neurology consult, appreciate recommendations  Neuropthalmology recommendation: continue med :   1. Diamox to 2000 mg p.o. daily, 1000 mg p.o. b.i.d.  2. Maintain the prednisone 5 mg p.o. daily.  3. Maintain the CellCept 1000 mg p.o. b.i.d.  4. Follow up in neurophthalmology clinic ___/2020?  Psychiatric consult, continue psychiatric medication  Q4 neuro checks.   Pain control for LBP, right knee pain  Pending  placement      Intracranial pressure increased  Assessment & Plan  Headache with whooshing sounds  Plan :  continue diamox 3 times daily.    TONYA (obstructive sleep apnea)- (present on admission)  Assessment & Plan  Not on CPAP at home. Patient has not followed up with doctor yet for the same.   Follow up outpatient.     UTI (urinary tract infection)  Assessment & Plan  History of dysuria,  UA: Cloudy urine, nitrite positive, leukocyte esterase small, WBC 20-50, bacteria many  Urine culture: Lactose fermenting Gram negative zita   >100,000 cfu/mL -Klebsiella pneumoniae sensitive to bactrim.  Got Bactrim for 3 days.    DM (diabetes mellitus) ([facility])- (present on admission)  Assessment & Plan  A1C 6.0 (08/2019)  Continue home meds.   Dulaglutide injection every Friday .   Diabetic diet.    Joint pain  Assessment & Plan  Patient report slipped off bed while in sitting position and landed on her knees and right elbow  She has significant pain on right knee upon palpation or movement  Given mechanism of injury will consider conservative management with rest, ice and lidocaine patch for right knee  Continue to monitor  If no improvement, then will consider xray imaging    Asthma- (present on admission)  Assessment & Plan  Continue home meds    Hypothyroidism- (present on admission)  Assessment & Plan  TSH 5.65  Increased Levothyroxine to 100mcg on 3/3  Continue levothyroxine, Patient will need to followed up outpatient in 1-2 month

## 2020-03-10 LAB
ALBUMIN SERPL BCP-MCNC: 4.1 G/DL (ref 3.2–4.9)
ALBUMIN SERPL BCP-MCNC: 4.2 G/DL (ref 3.2–4.9)
ALBUMIN/GLOB SERPL: 2.3 G/DL
ALBUMIN/GLOB SERPL: 2.3 G/DL
ALP SERPL-CCNC: 48 U/L (ref 30–99)
ALP SERPL-CCNC: 49 U/L (ref 30–99)
ALT SERPL-CCNC: 16 U/L (ref 2–50)
ALT SERPL-CCNC: 17 U/L (ref 2–50)
ANION GAP SERPL CALC-SCNC: 10 MMOL/L (ref 0–11.9)
ANION GAP SERPL CALC-SCNC: 11 MMOL/L (ref 0–11.9)
AST SERPL-CCNC: 10 U/L (ref 12–45)
AST SERPL-CCNC: 10 U/L (ref 12–45)
BILIRUB SERPL-MCNC: 0.3 MG/DL (ref 0.1–1.5)
BILIRUB SERPL-MCNC: 0.4 MG/DL (ref 0.1–1.5)
BUN SERPL-MCNC: 13 MG/DL (ref 8–22)
BUN SERPL-MCNC: 16 MG/DL (ref 8–22)
CALCIUM SERPL-MCNC: 9 MG/DL (ref 8.5–10.5)
CALCIUM SERPL-MCNC: 9.2 MG/DL (ref 8.5–10.5)
CHLORIDE SERPL-SCNC: 114 MMOL/L (ref 96–112)
CHLORIDE SERPL-SCNC: 115 MMOL/L (ref 96–112)
CO2 SERPL-SCNC: 15 MMOL/L (ref 20–33)
CO2 SERPL-SCNC: 15 MMOL/L (ref 20–33)
CREAT SERPL-MCNC: 0.81 MG/DL (ref 0.5–1.4)
CREAT SERPL-MCNC: 0.84 MG/DL (ref 0.5–1.4)
GLOBULIN SER CALC-MCNC: 1.8 G/DL (ref 1.9–3.5)
GLOBULIN SER CALC-MCNC: 1.8 G/DL (ref 1.9–3.5)
GLUCOSE SERPL-MCNC: 101 MG/DL (ref 65–99)
GLUCOSE SERPL-MCNC: 97 MG/DL (ref 65–99)
POTASSIUM SERPL-SCNC: 3.3 MMOL/L (ref 3.6–5.5)
POTASSIUM SERPL-SCNC: 3.4 MMOL/L (ref 3.6–5.5)
PROT SERPL-MCNC: 5.9 G/DL (ref 6–8.2)
PROT SERPL-MCNC: 6 G/DL (ref 6–8.2)
SODIUM SERPL-SCNC: 139 MMOL/L (ref 135–145)
SODIUM SERPL-SCNC: 141 MMOL/L (ref 135–145)

## 2020-03-10 PROCEDURE — 80053 COMPREHEN METABOLIC PANEL: CPT

## 2020-03-10 PROCEDURE — A9270 NON-COVERED ITEM OR SERVICE: HCPCS | Performed by: INTERNAL MEDICINE

## 2020-03-10 PROCEDURE — 94760 N-INVAS EAR/PLS OXIMETRY 1: CPT

## 2020-03-10 PROCEDURE — 99224 PR SUBSEQUENT OBSERVATION CARE,LEVEL I: CPT | Mod: GC | Performed by: INTERNAL MEDICINE

## 2020-03-10 PROCEDURE — 94640 AIRWAY INHALATION TREATMENT: CPT

## 2020-03-10 PROCEDURE — A9270 NON-COVERED ITEM OR SERVICE: HCPCS | Performed by: PSYCHIATRY & NEUROLOGY

## 2020-03-10 PROCEDURE — 700102 HCHG RX REV CODE 250 W/ 637 OVERRIDE(OP): Performed by: STUDENT IN AN ORGANIZED HEALTH CARE EDUCATION/TRAINING PROGRAM

## 2020-03-10 PROCEDURE — 700101 HCHG RX REV CODE 250: Performed by: INTERNAL MEDICINE

## 2020-03-10 PROCEDURE — A9270 NON-COVERED ITEM OR SERVICE: HCPCS | Performed by: STUDENT IN AN ORGANIZED HEALTH CARE EDUCATION/TRAINING PROGRAM

## 2020-03-10 PROCEDURE — G0378 HOSPITAL OBSERVATION PER HR: HCPCS

## 2020-03-10 PROCEDURE — 700111 HCHG RX REV CODE 636 W/ 250 OVERRIDE (IP): Performed by: STUDENT IN AN ORGANIZED HEALTH CARE EDUCATION/TRAINING PROGRAM

## 2020-03-10 PROCEDURE — 700102 HCHG RX REV CODE 250 W/ 637 OVERRIDE(OP): Performed by: INTERNAL MEDICINE

## 2020-03-10 PROCEDURE — 700101 HCHG RX REV CODE 250: Performed by: STUDENT IN AN ORGANIZED HEALTH CARE EDUCATION/TRAINING PROGRAM

## 2020-03-10 PROCEDURE — 700102 HCHG RX REV CODE 250 W/ 637 OVERRIDE(OP): Performed by: PSYCHIATRY & NEUROLOGY

## 2020-03-10 PROCEDURE — 96372 THER/PROPH/DIAG INJ SC/IM: CPT

## 2020-03-10 PROCEDURE — 700111 HCHG RX REV CODE 636 W/ 250 OVERRIDE (IP): Performed by: INTERNAL MEDICINE

## 2020-03-10 PROCEDURE — 36415 COLL VENOUS BLD VENIPUNCTURE: CPT

## 2020-03-10 RX ADMIN — OXCARBAZEPINE 150 MG: 150 TABLET, FILM COATED ORAL at 17:32

## 2020-03-10 RX ADMIN — PREDNISONE 5 MG: 5 TABLET ORAL at 04:58

## 2020-03-10 RX ADMIN — ALBUTEROL SULFATE 2.5 MG: 2.5 SOLUTION RESPIRATORY (INHALATION) at 20:10

## 2020-03-10 RX ADMIN — ALBUTEROL SULFATE 2.5 MG: 2.5 SOLUTION RESPIRATORY (INHALATION) at 07:55

## 2020-03-10 RX ADMIN — BUDESONIDE AND FORMOTEROL FUMARATE DIHYDRATE 2 PUFF: 160; 4.5 AEROSOL RESPIRATORY (INHALATION) at 04:58

## 2020-03-10 RX ADMIN — MYCOPHENOLATE MOFETIL 1000 MG: 250 CAPSULE ORAL at 17:32

## 2020-03-10 RX ADMIN — ASPIRIN 81 MG: 81 TABLET, COATED ORAL at 04:58

## 2020-03-10 RX ADMIN — PREGABALIN 300 MG: 100 CAPSULE ORAL at 04:58

## 2020-03-10 RX ADMIN — LIDOCAINE 1 PATCH: 50 PATCH TOPICAL at 04:59

## 2020-03-10 RX ADMIN — BUSPIRONE HYDROCHLORIDE 10 MG: 10 TABLET ORAL at 04:58

## 2020-03-10 RX ADMIN — FLUOXETINE HYDROCHLORIDE 40 MG: 20 CAPSULE ORAL at 04:57

## 2020-03-10 RX ADMIN — ACETAZOLAMIDE 1000 MG: 500 CAPSULE, EXTENDED RELEASE ORAL at 17:31

## 2020-03-10 RX ADMIN — LEVOTHYROXINE SODIUM 100 MCG: 100 TABLET ORAL at 04:57

## 2020-03-10 RX ADMIN — OXYCODONE HYDROCHLORIDE AND ACETAMINOPHEN 1 TABLET: 5; 325 TABLET ORAL at 05:07

## 2020-03-10 RX ADMIN — ALBUTEROL SULFATE 2 PUFF: 90 AEROSOL, METERED RESPIRATORY (INHALATION) at 13:36

## 2020-03-10 RX ADMIN — BUDESONIDE AND FORMOTEROL FUMARATE DIHYDRATE 2 PUFF: 160; 4.5 AEROSOL RESPIRATORY (INHALATION) at 17:32

## 2020-03-10 RX ADMIN — ACETAZOLAMIDE 1000 MG: 500 CAPSULE, EXTENDED RELEASE ORAL at 04:58

## 2020-03-10 RX ADMIN — OXYCODONE HYDROCHLORIDE AND ACETAMINOPHEN 1 TABLET: 5; 325 TABLET ORAL at 10:55

## 2020-03-10 RX ADMIN — ZIPRASIDONE HYDROCHLORIDE 40 MG: 40 CAPSULE ORAL at 17:32

## 2020-03-10 RX ADMIN — TRAZODONE HYDROCHLORIDE 50 MG: 100 TABLET ORAL at 17:31

## 2020-03-10 RX ADMIN — ALBUTEROL SULFATE 2.5 MG: 2.5 SOLUTION RESPIRATORY (INHALATION) at 15:59

## 2020-03-10 RX ADMIN — BUSPIRONE HYDROCHLORIDE 10 MG: 10 TABLET ORAL at 21:58

## 2020-03-10 RX ADMIN — LIDOCAINE 1 PATCH: 50 PATCH TOPICAL at 13:44

## 2020-03-10 RX ADMIN — ENOXAPARIN SODIUM 40 MG: 100 INJECTION SUBCUTANEOUS at 04:58

## 2020-03-10 RX ADMIN — OXYCODONE HYDROCHLORIDE AND ACETAMINOPHEN 1 TABLET: 5; 325 TABLET ORAL at 18:19

## 2020-03-10 RX ADMIN — PREGABALIN 300 MG: 100 CAPSULE ORAL at 17:31

## 2020-03-10 RX ADMIN — ZIPRASIDONE HYDROCHLORIDE 40 MG: 40 CAPSULE ORAL at 04:57

## 2020-03-10 RX ADMIN — ALBUTEROL SULFATE 2.5 MG: 2.5 SOLUTION RESPIRATORY (INHALATION) at 11:47

## 2020-03-10 RX ADMIN — OXCARBAZEPINE 150 MG: 150 TABLET, FILM COATED ORAL at 04:57

## 2020-03-10 RX ADMIN — MYCOPHENOLATE MOFETIL 1000 MG: 250 CAPSULE ORAL at 07:55

## 2020-03-10 NOTE — PROGRESS NOTES
Alert and lets her needs known; incont of urine and offered the purewick but refused with many cons to using the device. C/o pain; medicate as requested. Cont plan of care, call light within reach and strip bed alarm placed for fall history

## 2020-03-10 NOTE — CARE PLAN
Problem: Bronchoconstriction:  Goal: Improve in air movement and diminished wheezing  Outcome: PROGRESSING AS EXPECTED   Pt receiving QID Albuterol. Pt on RA.

## 2020-03-10 NOTE — ASSESSMENT & PLAN NOTE
Right knee pain significantly improved.   Patient report slipped off bed while in sitting position and landed on her knees and right elbow  She has significant pain on right knee upon palpation or movement  Given mechanism of injury will consider conservative management with rest, ice and lidocaine patch for right knee  Xray of R knee without bony abnormality or joint effusion    PLAN  Continue Tylenol Q6H Prn   IV Ketorolac 30mg once  PRN-if Tylenol doesn't help w/ pain control. no hx of GI bleed, Renal fnx WNL  Conservative management

## 2020-03-10 NOTE — PROGRESS NOTES
"2 RN skin check complete. eryn  Devices in place na  Skin assessed under devices na  Confirmed pressure ulcers found on gluteal fissure, 3 open wounds to abdomin \"from cat scratches '  New potential pressure ulcers noted on gluteal fissure. Wound consult placed na  The following interventions in place waffle cushion, turning, rosalina cream to sacral area, incont checks but refuses purewick    Gluteal fissure noted; rosalina cream applied  Abdomin with 3 open wounds with scant drainage \"from cat scratches\" wants it left open to air  Excoriation noted to bilat groin area; rosalina cream applied  "

## 2020-03-10 NOTE — PROGRESS NOTES
Daily Progress Note:     Date of Service: 3/10/2020  Primary Team: [employer] [facility] White Team   Attending: Sonal Witt M.D.   Senior Resident: Dr. Bonds  Intern: Dr. Goodson  Contact:  [phone]    Chief Complaint:   Bilateral lower limb weakness/ numbness    Subjective:   No acute overnight event. Afebrile.  Patient continue to have chronic bilateral lower extremity numbness and weakness though she reports slight improvement. She reports her right knee pain is somewhat controlled with lidocaine patch. Pain is located more in medial knee today. She still has pain with palpation of her right knee. She agrees with plan of ice pack and lidocaine for treatment of right knee pain.     Consultants/Specialty:  Neurology  Psychiatry  Review of Systems:      Constitutional: Negative for fever and chills  Cardiovascular: Negative for chest pain and palpitations  Respiratory: Negative for cough and hemoptysis  Gastrointestinal: Positive for nausea, negative for abdominal pain, vomiting  Genitourinary: Negative for dysuria and hematuria  Musculoskeletal: Reports joint pain, in bilateral knee and right elbow  Neurological: Positive for mild headache, negative for dizziness, no visual changes  Psychiatric: Negative for SI/HI    Objective Data:   Physical Exam:   Vitals:   Temp:  [36.4 °C (97.5 °F)-37.1 °C (98.7 °F)] 36.6 °C (97.9 °F)  Pulse:  [70-86] 75  Resp:  [16-20] 18  BP: (101-113)/(46-65) 104/47  SpO2:  [92 %-98 %] 98 %       Physical Exam  Constitutional:       General: She is not in acute distress.     Appearance: Normal appearance. She is obese.   HENT:      Head: Normocephalic and atraumatic.   Eyes:      Extraocular Movements: Extraocular movements intact.      Pupils: Pupils are equal, round, and reactive to light.   Neck:      Musculoskeletal: Normal range of motion and neck supple.   Cardiovascular:      Rate and Rhythm: Normal rate and regular rhythm.      Heart sounds: Normal heart sounds.   Pulmonary:      Effort:  Pulmonary effort is normal. No respiratory distress.      Breath sounds: Normal breath sounds. No wheezing.   Abdominal:      General: Bowel sounds are normal. There is no distension.      Palpations: Abdomen is soft.   Musculoskeletal:         General: Tenderness present. No swelling or deformity.      Comments: ROM of bilateral knee and right elbow limited by pain  Chronic weakness in bilateral lower extremity  Soreness/ tenderness with palpation of R lateral proximal LE and medial knee   Skin:     General: Skin is warm and dry.      Comments: Slight bruising distal to right knee, anteriorly   Neurological:      Mental Status: She is alert and oriented to person, place, and time. Mental status is at baseline.           Labs:   Recent Labs     03/09/20  0436   WBC 5.2   RBC 4.02*   HEMOGLOBIN 12.4   HEMATOCRIT 37.4   MCV 93.0   MCH 30.8   RDW 48.5   PLATELETCT 133*   MPV 12.1   NEUTSPOLYS 55.10   LYMPHOCYTES 35.50   MONOCYTES 7.00   EOSINOPHILS 1.60   BASOPHILS 0.40     Recent Labs     03/09/20  0436 03/10/20  0834   SODIUM 140 141   POTASSIUM 3.5* 3.4*   CHLORIDE 114* 115*   CO2 15* 15*   GLUCOSE 90 101*   BUN 15 13       Imaging:   No new imaging    * Leg weakness, bilateral  Assessment & Plan  -Patient caregiver states she is unable to provide safe transfers between chair and bed  -PT states there is no more equipment they can provide to assist with it  -It is likely that patient will require long term care facility placement  -CT spine ruled out any acute pathology   -Patient unable to undergo MRI because of Stimulator in place.     Neurology consult, appreciate recommendations  Neuropthalmology recommendation: continue med :   1. Diamox to 2000 mg p.o. daily, 1000 mg p.o. b.i.d.  2. Maintain the prednisone 5 mg p.o. daily.  3. Maintain the CellCept 1000 mg p.o. b.i.d.  4. Follow up in neurophthalmology clinic ___/2020?  Psychiatric consult, continue psychiatric medication  Q4 neuro checks.   Pain control for  LBP, right knee pain  Pending placement      Intracranial pressure increased  Assessment & Plan  -Clinically stable  -Headache with whooshing sounds  -Patient was previously on Lasix started at dose of 20mg in 2017 for bilateral lower extremity edema, and has been on lasix for several years. Her lasix 80mg daily was discontinued upon discharge from hospital 02/25/2020.   -Patient was started on Diamox / filled diamox 02/27/20 outpatient , dose increased while patient was inpatient during this hospitalization to 1000mg BID per neurology  - Discussed with patient while she is taking diamox for her increase ICP, Diamox does have diuretic effect, and initiating furosemide may increase risk of electrolyte abnormality  Plan :  Continue Diamox    TONYA (obstructive sleep apnea)- (present on admission)  Assessment & Plan  Not on CPAP at home. Patient has not followed up with doctor yet for the same.   Follow up outpatient.     UTI (urinary tract infection)  Assessment & Plan  History of dysuria,  UA: Cloudy urine, nitrite positive, leukocyte esterase small, WBC 20-50, bacteria many  Urine culture: Lactose fermenting Gram negative zita   >100,000 cfu/mL -Klebsiella pneumoniae sensitive to bactrim.  Got Bactrim for 3 days.    DM (diabetes mellitus) ([facility])- (present on admission)  Assessment & Plan  A1C 6.0 (08/2019)  Continue home meds.   Dulaglutide injection every Friday .   Diabetic diet.    Joint pain  Assessment & Plan  Patient report slipped off bed while in sitting position and landed on her knees and right elbow  She has significant pain on right knee upon palpation or movement  Given mechanism of injury will consider conservative management with rest, ice and lidocaine patch for right knee  Patient report pain is tolerable with lidocaine patch and ice pack, continue to monitor consider imaging is pain progressively worsens.     Asthma- (present on admission)  Assessment & Plan  Continue home meds    Hypothyroidism-  (present on admission)  Assessment & Plan  TSH 5.65  Increased Levothyroxine to 100mcg on 3/3  Continue levothyroxine, Patient will need to followed up outpatient in 1-2 month

## 2020-03-10 NOTE — CARE PLAN
Problem: Bronchoconstriction:  Goal: Improve in air movement and diminished wheezing  Outcome: PROGRESSING AS EXPECTED     Albuterol qid

## 2020-03-10 NOTE — CARE PLAN
Problem: Safety  Goal: Will remain free from injury  Outcome: PROGRESSING AS EXPECTED  Strip alarm placed for fall history  Problem: Discharge Barriers/Planning  Goal: Patient's continuum of care needs will be met  Outcome: PROGRESSING AS EXPECTED  Awaiting placement plans ; social work involvement

## 2020-03-11 ENCOUNTER — APPOINTMENT (OUTPATIENT)
Dept: RADIOLOGY | Facility: MEDICAL CENTER | Age: 30
DRG: 880 | End: 2020-03-11
Attending: STUDENT IN AN ORGANIZED HEALTH CARE EDUCATION/TRAINING PROGRAM
Payer: MEDICARE

## 2020-03-11 LAB
APPEARANCE UR: ABNORMAL
BACTERIA #/AREA URNS HPF: ABNORMAL /HPF
BILIRUB UR QL STRIP.AUTO: NEGATIVE
CAOX CRY #/AREA URNS HPF: ABNORMAL /HPF
COLOR UR: YELLOW
EPI CELLS #/AREA URNS HPF: ABNORMAL /HPF
GLUCOSE UR STRIP.AUTO-MCNC: NEGATIVE MG/DL
HYALINE CASTS #/AREA URNS LPF: ABNORMAL /LPF
KETONES UR STRIP.AUTO-MCNC: NEGATIVE MG/DL
LEUKOCYTE ESTERASE UR QL STRIP.AUTO: ABNORMAL
MICRO URNS: ABNORMAL
NITRITE UR QL STRIP.AUTO: NEGATIVE
PH UR STRIP.AUTO: 6.5 [PH] (ref 5–8)
PROT UR QL STRIP: NEGATIVE MG/DL
RBC # URNS HPF: ABNORMAL /HPF
RBC UR QL AUTO: NEGATIVE
SP GR UR STRIP.AUTO: 1.02
UROBILINOGEN UR STRIP.AUTO-MCNC: 1 MG/DL
WBC #/AREA URNS HPF: ABNORMAL /HPF

## 2020-03-11 PROCEDURE — 73560 X-RAY EXAM OF KNEE 1 OR 2: CPT | Mod: RT

## 2020-03-11 PROCEDURE — G0378 HOSPITAL OBSERVATION PER HR: HCPCS

## 2020-03-11 PROCEDURE — 94640 AIRWAY INHALATION TREATMENT: CPT

## 2020-03-11 PROCEDURE — 96372 THER/PROPH/DIAG INJ SC/IM: CPT

## 2020-03-11 PROCEDURE — 700101 HCHG RX REV CODE 250: Performed by: INTERNAL MEDICINE

## 2020-03-11 PROCEDURE — 700111 HCHG RX REV CODE 636 W/ 250 OVERRIDE (IP): Performed by: STUDENT IN AN ORGANIZED HEALTH CARE EDUCATION/TRAINING PROGRAM

## 2020-03-11 PROCEDURE — 700102 HCHG RX REV CODE 250 W/ 637 OVERRIDE(OP): Performed by: INTERNAL MEDICINE

## 2020-03-11 PROCEDURE — 99225 PR SUBSEQUENT OBSERVATION CARE,LEVEL II: CPT | Mod: GC | Performed by: INTERNAL MEDICINE

## 2020-03-11 PROCEDURE — 700111 HCHG RX REV CODE 636 W/ 250 OVERRIDE (IP): Performed by: INTERNAL MEDICINE

## 2020-03-11 PROCEDURE — 700102 HCHG RX REV CODE 250 W/ 637 OVERRIDE(OP): Performed by: STUDENT IN AN ORGANIZED HEALTH CARE EDUCATION/TRAINING PROGRAM

## 2020-03-11 PROCEDURE — A9270 NON-COVERED ITEM OR SERVICE: HCPCS | Performed by: STUDENT IN AN ORGANIZED HEALTH CARE EDUCATION/TRAINING PROGRAM

## 2020-03-11 PROCEDURE — 94760 N-INVAS EAR/PLS OXIMETRY 1: CPT

## 2020-03-11 PROCEDURE — 700102 HCHG RX REV CODE 250 W/ 637 OVERRIDE(OP): Performed by: PSYCHIATRY & NEUROLOGY

## 2020-03-11 PROCEDURE — 700101 HCHG RX REV CODE 250: Performed by: STUDENT IN AN ORGANIZED HEALTH CARE EDUCATION/TRAINING PROGRAM

## 2020-03-11 PROCEDURE — A9270 NON-COVERED ITEM OR SERVICE: HCPCS | Performed by: INTERNAL MEDICINE

## 2020-03-11 PROCEDURE — 81001 URINALYSIS AUTO W/SCOPE: CPT

## 2020-03-11 PROCEDURE — A9270 NON-COVERED ITEM OR SERVICE: HCPCS | Performed by: PSYCHIATRY & NEUROLOGY

## 2020-03-11 RX ORDER — IBUPROFEN 800 MG/1
400 TABLET ORAL EVERY 6 HOURS PRN
Status: DISPENSED | OUTPATIENT
Start: 2020-03-11 | End: 2020-03-14

## 2020-03-11 RX ORDER — ACETAZOLAMIDE 500 MG/1
500 CAPSULE, EXTENDED RELEASE ORAL ONCE
Status: COMPLETED | OUTPATIENT
Start: 2020-03-11 | End: 2020-03-11

## 2020-03-11 RX ORDER — MONTELUKAST SODIUM 10 MG/1
10 TABLET ORAL NIGHTLY
Status: DISCONTINUED | OUTPATIENT
Start: 2020-03-11 | End: 2020-03-24 | Stop reason: HOSPADM

## 2020-03-11 RX ADMIN — IBUPROFEN 400 MG: 800 TABLET, FILM COATED ORAL at 15:31

## 2020-03-11 RX ADMIN — GLYCOPYRROLATE 1 CAPSULE: 15.6 CAPSULE RESPIRATORY (INHALATION) at 20:04

## 2020-03-11 RX ADMIN — SENNOSIDES AND DOCUSATE SODIUM 2 TABLET: 8.6; 5 TABLET ORAL at 17:00

## 2020-03-11 RX ADMIN — ACETAZOLAMIDE 500 MG: 500 CAPSULE, EXTENDED RELEASE ORAL at 11:32

## 2020-03-11 RX ADMIN — ENOXAPARIN SODIUM 40 MG: 100 INJECTION SUBCUTANEOUS at 05:52

## 2020-03-11 RX ADMIN — MYCOPHENOLATE MOFETIL 1000 MG: 250 CAPSULE ORAL at 05:52

## 2020-03-11 RX ADMIN — OXYCODONE HYDROCHLORIDE AND ACETAMINOPHEN 1 TABLET: 5; 325 TABLET ORAL at 00:37

## 2020-03-11 RX ADMIN — LIDOCAINE 1 PATCH: 50 PATCH TOPICAL at 05:54

## 2020-03-11 RX ADMIN — MONTELUKAST 10 MG: 10 TABLET, FILM COATED ORAL at 20:11

## 2020-03-11 RX ADMIN — BUDESONIDE AND FORMOTEROL FUMARATE DIHYDRATE 2 PUFF: 160; 4.5 AEROSOL RESPIRATORY (INHALATION) at 18:00

## 2020-03-11 RX ADMIN — PREDNISONE 5 MG: 5 TABLET ORAL at 05:53

## 2020-03-11 RX ADMIN — OXYCODONE HYDROCHLORIDE AND ACETAMINOPHEN 1 TABLET: 5; 325 TABLET ORAL at 18:46

## 2020-03-11 RX ADMIN — BUSPIRONE HYDROCHLORIDE 10 MG: 10 TABLET ORAL at 05:53

## 2020-03-11 RX ADMIN — PREGABALIN 300 MG: 100 CAPSULE ORAL at 17:01

## 2020-03-11 RX ADMIN — BUSPIRONE HYDROCHLORIDE 10 MG: 10 TABLET ORAL at 17:00

## 2020-03-11 RX ADMIN — PREGABALIN 300 MG: 100 CAPSULE ORAL at 05:54

## 2020-03-11 RX ADMIN — ALBUTEROL SULFATE 2.5 MG: 2.5 SOLUTION RESPIRATORY (INHALATION) at 15:00

## 2020-03-11 RX ADMIN — FLUOXETINE HYDROCHLORIDE 40 MG: 20 CAPSULE ORAL at 05:54

## 2020-03-11 RX ADMIN — ACETAZOLAMIDE 1000 MG: 500 CAPSULE, EXTENDED RELEASE ORAL at 17:00

## 2020-03-11 RX ADMIN — TRAZODONE HYDROCHLORIDE 50 MG: 100 TABLET ORAL at 17:01

## 2020-03-11 RX ADMIN — BUDESONIDE AND FORMOTEROL FUMARATE DIHYDRATE 2 PUFF: 160; 4.5 AEROSOL RESPIRATORY (INHALATION) at 05:52

## 2020-03-11 RX ADMIN — OXYCODONE HYDROCHLORIDE AND ACETAMINOPHEN 1 TABLET: 5; 325 TABLET ORAL at 06:08

## 2020-03-11 RX ADMIN — LEVOTHYROXINE SODIUM 100 MCG: 100 TABLET ORAL at 05:53

## 2020-03-11 RX ADMIN — ALBUTEROL SULFATE 2.5 MG: 2.5 SOLUTION RESPIRATORY (INHALATION) at 07:33

## 2020-03-11 RX ADMIN — ALBUTEROL SULFATE 2 PUFF: 90 AEROSOL, METERED RESPIRATORY (INHALATION) at 06:03

## 2020-03-11 RX ADMIN — ZIPRASIDONE HYDROCHLORIDE 40 MG: 40 CAPSULE ORAL at 05:52

## 2020-03-11 RX ADMIN — ZIPRASIDONE HYDROCHLORIDE 40 MG: 40 CAPSULE ORAL at 17:00

## 2020-03-11 RX ADMIN — ASPIRIN 81 MG: 81 TABLET, COATED ORAL at 05:53

## 2020-03-11 RX ADMIN — OXCARBAZEPINE 150 MG: 150 TABLET, FILM COATED ORAL at 17:00

## 2020-03-11 RX ADMIN — MYCOPHENOLATE MOFETIL 1000 MG: 250 CAPSULE ORAL at 18:47

## 2020-03-11 RX ADMIN — SENNOSIDES AND DOCUSATE SODIUM 2 TABLET: 8.6; 5 TABLET ORAL at 05:53

## 2020-03-11 RX ADMIN — ALBUTEROL SULFATE 2.5 MG: 2.5 SOLUTION RESPIRATORY (INHALATION) at 20:03

## 2020-03-11 RX ADMIN — OXCARBAZEPINE 150 MG: 150 TABLET, FILM COATED ORAL at 05:52

## 2020-03-11 RX ADMIN — ALBUTEROL SULFATE 2.5 MG: 2.5 SOLUTION RESPIRATORY (INHALATION) at 10:58

## 2020-03-11 RX ADMIN — OXYCODONE HYDROCHLORIDE AND ACETAMINOPHEN 1 TABLET: 5; 325 TABLET ORAL at 11:32

## 2020-03-11 RX ADMIN — ALBUTEROL SULFATE 2 PUFF: 90 AEROSOL, METERED RESPIRATORY (INHALATION) at 12:17

## 2020-03-11 RX ADMIN — LIDOCAINE 1 PATCH: 50 PATCH TOPICAL at 11:32

## 2020-03-11 NOTE — PROGRESS NOTES
Report received from [facility].  Bed in lowest position, wheels locked, call light within reach, all questions answered.  Bed alarm on

## 2020-03-11 NOTE — CARE PLAN
Problem: Bronchoconstriction:  Goal: Improve in air movement and diminished wheezing  Outcome: PROGRESSING AS EXPECTED     Albuterol qid.

## 2020-03-11 NOTE — PROGRESS NOTES
Daily Progress Note:     Date of Service: 3/11/2020  Primary Team: [facility] IM White Team   Attending: Sonal Witt M.D.   Senior Resident: Dr. Bonds  Intern: Dr. Goodson  Contact:  [phone]    Chief Complaint:   Bilateral lower extremity weakness    Subjective:    No acute overnight event.     Headache  Patient report headache this morning, different then her usual headache which is associated with whooshing sound in occipital region/ ear. She states it feels like throbbing in back of the head that becomes worse when laying down. She believes her headache is related recent change to dosage of diamox, because her characteristic of her headache is different. She declined her morning dose of 1000mg diamox but agree to take 500mg diamox in AM, and continue 1000mg in PM. She later report her headache may be related to low caffeine intake.     Right knee pain  Additionally, patient report hearing/ sensation of bone cracking in the right knee and right knee pain. Xray was ordered and did not show acute bony abnormality or joint effusion, result discussed with patient.   - pain appears less when distracted    Dysuria  Continue to experience sensation of burning/ hot urination.     - Will keep patient in neurology unit for closer monitoring, given recent change in quality of headache. Will evaluate tomorrow AM  - NSAID 400mg Q6H PRN ordered for 3 day, for right knee pain, will continue to reassess        Consultants/Specialty:  Neurology  Psychiatry  Review of Systems:      Constitutional: Negative for fever and chills. Report headaches.  Cardiovascular: Negative for chest pain and palpitations  Respiratory: Negative for cough and hemoptysis  Gastrointestinal: Positive for nausea, negative for abdominal pain, vomiting  Genitourinary: reports dysuria and negative hematuria  Musculoskeletal: Reports joint pain, in bilateral knee and right elbow  Neurological: Positive for mild headache, negative for dizziness, no visual  changes  Psychiatric: Negative for SI/HI  Objective Data:   Physical Exam:   Vitals:   Temp:  [36.3 °C (97.4 °F)-36.9 °C (98.4 °F)] 36.4 °C (97.5 °F)  Pulse:  [70-90] 82  Resp:  [16-20] 18  BP: ()/(51-69) 108/55  SpO2:  [95 %-98 %] 96 %    Constitutional:       General: She is not in acute distress.     Appearance: Normal appearance. She is obese.   HENT:      Head: Normocephalic and atraumatic.   Eyes:      Extraocular Movements: Extraocular movements intact.      Pupils: Pupils are equal, round, and reactive to light.   Neck:      Musculoskeletal: Normal range of motion and neck supple.   Cardiovascular:      Rate and Rhythm: Normal rate and regular rhythm.      Heart sounds: Normal heart sounds.   Pulmonary:      Effort: Pulmonary effort is normal. No respiratory distress.      Breath sounds: Normal breath sounds. No wheezing.   Abdominal:      General: Bowel sounds are normal. There is no distension.      Palpations: Abdomen is soft.   Musculoskeletal:         General: Tenderness present. No swelling or deformity.      Comments: ROM of bilateral knee and right elbow limited by pain  Chronic weakness in bilateral lower extremity  Soreness/ tenderness with palpation of R lateral proximal LE and medial knee .   Skin:     General: Skin is warm and dry.      Comments: Slight bruising distal to right knee, anteriorly. No signs of active inflammation , no increase in warm, no significant change from previous admission   Neurological:      Mental Status: She is alert and oriented to person, place, and time. Mental status is at baseline.      Labs:   Recent Labs     03/09/20  0436   WBC 5.2   RBC 4.02*   HEMOGLOBIN 12.4   HEMATOCRIT 37.4   MCV 93.0   MCH 30.8   RDW 48.5   PLATELETCT 133*   MPV 12.1   NEUTSPOLYS 55.10   LYMPHOCYTES 35.50   MONOCYTES 7.00   EOSINOPHILS 1.60   BASOPHILS 0.40      Imaging:   Xray right knee: 03/11/2020  IMPRESSION:     1.  No acute findings or significant arthropathy identified. There  is mild narrowing of the medial compartment    * Leg weakness, bilateral  Assessment & Plan  -Patient caregiver states she is unable to provide safe transfers between chair and bed  -PT states there is no more equipment they can provide to assist with it  -It is likely that patient will require long term care facility placement  -CT spine ruled out any acute pathology   -Patient unable to undergo MRI because of Stimulator in place.     Neurology consult, appreciate recommendations  Neuropthalmology recommendation: continue med :   1. Diamox, 1000 mg p.o. b.i.d.  2. Maintain the prednisone 5 mg p.o. daily.  3. Maintain the CellCept 1000 mg p.o. b.i.d.  4. Follow up in neurophthalmology clinic at discharge  Psychiatric consult, continue psychiatric medication  Q4 neuro checks.   Pain control for LBP, right knee pain  Pending placement      Intracranial pressure increased  Assessment & Plan  -Clinically stable.   -Headache with whooshing sounds  -Patient was previously on Lasix started at dose of 20mg in 2017 for bilateral lower extremity edema, and has been on lasix for several years. Her lasix 80mg daily was discontinued upon discharge from hospital 02/25/2020.   -Patient was started on Diamox / filled diamox 02/27/20 outpatient , dose increased while patient was inpatient during this hospitalization to 1000mg BID per neurology  - Patient reports headache this AM, occipital throbbing pain not associated with whooshing sound, worst when laying down. She describe headache has similar sensation to post LP headache.     Plan :  Discuss with patient to take 500mg Diamox this AM, 1000mg Diamox in PM  Patient to consider resuming regular dose of Diamox 03/12/20      TONYA (obstructive sleep apnea)- (present on admission)  Assessment & Plan  Not on CPAP at home. Patient has not followed up with doctor yet for the same.   Follow up outpatient.     UTI (urinary tract infection)  Assessment & Plan  History of dysuria,  UA: Cloudy  urine, nitrite positive, leukocyte esterase small, WBC 20-50, bacteria many  Urine culture: Lactose fermenting Gram negative zita   >100,000 cfu/mL -Klebsiella pneumoniae sensitive to bactrim.  Got Bactrim for 3 days    - reports dysuria, will obtain UA, re-evaluate    DM (diabetes mellitus) ([facility])- (present on admission)  Assessment & Plan  A1C 6.0 (08/2019)  Continue home meds.   Dulaglutide injection every Friday .   Diabetic diet.    Joint pain  Assessment & Plan  Patient report slipped off bed while in sitting position and landed on her knees and right elbow  She has significant pain on right knee upon palpation or movement  Given mechanism of injury will consider conservative management with rest, ice and lidocaine patch for right knee  Xray of R knee without bony abnormality or joint effusion  PLAN  Ibuprofen 400mg Q6H Prn ordered for 3 days for acute episode of pain in right knee  Continue lidocaine patch and conservative management    Asthma- (present on admission)  Assessment & Plan  Continue home meds  Start montelukast and glycopyrronium bromide as per RT rec    Hypothyroidism- (present on admission)  Assessment & Plan  TSH 5.65  Increased Levothyroxine to 100mcg on 3/3  Continue levothyroxine, Patient will need to followed up outpatient in 1-2 month

## 2020-03-12 LAB
ALBUMIN SERPL BCP-MCNC: 4.1 G/DL (ref 3.2–4.9)
ALBUMIN/GLOB SERPL: 2.2 G/DL
ALP SERPL-CCNC: 54 U/L (ref 30–99)
ALT SERPL-CCNC: 15 U/L (ref 2–50)
ANION GAP SERPL CALC-SCNC: 15 MMOL/L (ref 7–16)
AST SERPL-CCNC: 12 U/L (ref 12–45)
BILIRUB SERPL-MCNC: 0.4 MG/DL (ref 0.1–1.5)
BUN SERPL-MCNC: 14 MG/DL (ref 8–22)
CALCIUM SERPL-MCNC: 9.3 MG/DL (ref 8.4–10.2)
CHLORIDE SERPL-SCNC: 111 MMOL/L (ref 96–112)
CO2 SERPL-SCNC: 15 MMOL/L (ref 20–33)
CREAT SERPL-MCNC: 0.76 MG/DL (ref 0.5–1.4)
ERYTHROCYTE [DISTWIDTH] IN BLOOD BY AUTOMATED COUNT: 46.5 FL (ref 35.9–50)
GLOBULIN SER CALC-MCNC: 1.9 G/DL (ref 1.9–3.5)
GLUCOSE SERPL-MCNC: 102 MG/DL (ref 65–99)
HCT VFR BLD AUTO: 35.7 % (ref 37–47)
HGB BLD-MCNC: 12.1 G/DL (ref 12–16)
MCH RBC QN AUTO: 30.8 PG (ref 27–33)
MCHC RBC AUTO-ENTMCNC: 33.9 G/DL (ref 33.6–35)
MCV RBC AUTO: 90.8 FL (ref 81.4–97.8)
PLATELET # BLD AUTO: 130 K/UL (ref 164–446)
PMV BLD AUTO: 11.7 FL (ref 9–12.9)
POTASSIUM SERPL-SCNC: 3.3 MMOL/L (ref 3.6–5.5)
PROT SERPL-MCNC: 6 G/DL (ref 6–8.2)
RBC # BLD AUTO: 3.93 M/UL (ref 4.2–5.4)
SODIUM SERPL-SCNC: 141 MMOL/L (ref 135–145)
WBC # BLD AUTO: 4.8 K/UL (ref 4.8–10.8)

## 2020-03-12 PROCEDURE — 700101 HCHG RX REV CODE 250: Performed by: STUDENT IN AN ORGANIZED HEALTH CARE EDUCATION/TRAINING PROGRAM

## 2020-03-12 PROCEDURE — 700101 HCHG RX REV CODE 250: Performed by: INTERNAL MEDICINE

## 2020-03-12 PROCEDURE — G0378 HOSPITAL OBSERVATION PER HR: HCPCS

## 2020-03-12 PROCEDURE — A9270 NON-COVERED ITEM OR SERVICE: HCPCS | Performed by: INTERNAL MEDICINE

## 2020-03-12 PROCEDURE — 700102 HCHG RX REV CODE 250 W/ 637 OVERRIDE(OP): Performed by: STUDENT IN AN ORGANIZED HEALTH CARE EDUCATION/TRAINING PROGRAM

## 2020-03-12 PROCEDURE — A9270 NON-COVERED ITEM OR SERVICE: HCPCS | Performed by: STUDENT IN AN ORGANIZED HEALTH CARE EDUCATION/TRAINING PROGRAM

## 2020-03-12 PROCEDURE — 700102 HCHG RX REV CODE 250 W/ 637 OVERRIDE(OP): Performed by: INTERNAL MEDICINE

## 2020-03-12 PROCEDURE — 700111 HCHG RX REV CODE 636 W/ 250 OVERRIDE (IP): Performed by: STUDENT IN AN ORGANIZED HEALTH CARE EDUCATION/TRAINING PROGRAM

## 2020-03-12 PROCEDURE — 700111 HCHG RX REV CODE 636 W/ 250 OVERRIDE (IP): Performed by: INTERNAL MEDICINE

## 2020-03-12 PROCEDURE — 99224 PR SUBSEQUENT OBSERVATION CARE,LEVEL I: CPT | Mod: GC | Performed by: INTERNAL MEDICINE

## 2020-03-12 PROCEDURE — 85027 COMPLETE CBC AUTOMATED: CPT

## 2020-03-12 PROCEDURE — 94640 AIRWAY INHALATION TREATMENT: CPT

## 2020-03-12 PROCEDURE — L4398 FOOT DROP SPLINT PRE OTS: HCPCS

## 2020-03-12 PROCEDURE — 80053 COMPREHEN METABOLIC PANEL: CPT

## 2020-03-12 PROCEDURE — 700102 HCHG RX REV CODE 250 W/ 637 OVERRIDE(OP): Performed by: PSYCHIATRY & NEUROLOGY

## 2020-03-12 PROCEDURE — A9270 NON-COVERED ITEM OR SERVICE: HCPCS | Performed by: PSYCHIATRY & NEUROLOGY

## 2020-03-12 PROCEDURE — 96372 THER/PROPH/DIAG INJ SC/IM: CPT

## 2020-03-12 PROCEDURE — 36415 COLL VENOUS BLD VENIPUNCTURE: CPT

## 2020-03-12 RX ADMIN — GLYCOPYRROLATE 1 CAPSULE: 15.6 CAPSULE RESPIRATORY (INHALATION) at 06:07

## 2020-03-12 RX ADMIN — ENOXAPARIN SODIUM 40 MG: 100 INJECTION SUBCUTANEOUS at 05:40

## 2020-03-12 RX ADMIN — BUDESONIDE AND FORMOTEROL FUMARATE DIHYDRATE 2 PUFF: 160; 4.5 AEROSOL RESPIRATORY (INHALATION) at 05:42

## 2020-03-12 RX ADMIN — BUDESONIDE AND FORMOTEROL FUMARATE DIHYDRATE 2 PUFF: 160; 4.5 AEROSOL RESPIRATORY (INHALATION) at 17:53

## 2020-03-12 RX ADMIN — PREGABALIN 300 MG: 100 CAPSULE ORAL at 05:41

## 2020-03-12 RX ADMIN — ZIPRASIDONE HYDROCHLORIDE 40 MG: 40 CAPSULE ORAL at 05:41

## 2020-03-12 RX ADMIN — ZIPRASIDONE HYDROCHLORIDE 40 MG: 40 CAPSULE ORAL at 17:54

## 2020-03-12 RX ADMIN — POLYETHYLENE GLYCOL (3350) 1 PACKET: 17 POWDER, FOR SOLUTION ORAL at 17:53

## 2020-03-12 RX ADMIN — MYCOPHENOLATE MOFETIL 1000 MG: 250 CAPSULE ORAL at 17:54

## 2020-03-12 RX ADMIN — SENNOSIDES AND DOCUSATE SODIUM 2 TABLET: 8.6; 5 TABLET ORAL at 17:54

## 2020-03-12 RX ADMIN — OXYCODONE HYDROCHLORIDE AND ACETAMINOPHEN 1 TABLET: 5; 325 TABLET ORAL at 18:13

## 2020-03-12 RX ADMIN — ASPIRIN 81 MG: 81 TABLET, COATED ORAL at 05:41

## 2020-03-12 RX ADMIN — SENNOSIDES AND DOCUSATE SODIUM 2 TABLET: 8.6; 5 TABLET ORAL at 05:41

## 2020-03-12 RX ADMIN — IBUPROFEN 400 MG: 800 TABLET, FILM COATED ORAL at 05:42

## 2020-03-12 RX ADMIN — IBUPROFEN 400 MG: 800 TABLET, FILM COATED ORAL at 18:13

## 2020-03-12 RX ADMIN — IBUPROFEN 400 MG: 800 TABLET, FILM COATED ORAL at 12:05

## 2020-03-12 RX ADMIN — PREGABALIN 300 MG: 100 CAPSULE ORAL at 17:54

## 2020-03-12 RX ADMIN — OXCARBAZEPINE 150 MG: 150 TABLET, FILM COATED ORAL at 05:41

## 2020-03-12 RX ADMIN — ALBUTEROL SULFATE 2.5 MG: 2.5 SOLUTION RESPIRATORY (INHALATION) at 18:00

## 2020-03-12 RX ADMIN — MONTELUKAST 10 MG: 10 TABLET, FILM COATED ORAL at 21:54

## 2020-03-12 RX ADMIN — ALBUTEROL SULFATE 2.5 MG: 2.5 SOLUTION RESPIRATORY (INHALATION) at 06:07

## 2020-03-12 RX ADMIN — MYCOPHENOLATE MOFETIL 1000 MG: 250 CAPSULE ORAL at 05:40

## 2020-03-12 RX ADMIN — ALBUTEROL SULFATE 2.5 MG: 2.5 SOLUTION RESPIRATORY (INHALATION) at 09:59

## 2020-03-12 RX ADMIN — TRAZODONE HYDROCHLORIDE 50 MG: 100 TABLET ORAL at 17:55

## 2020-03-12 RX ADMIN — ACETAZOLAMIDE 1000 MG: 500 CAPSULE, EXTENDED RELEASE ORAL at 17:54

## 2020-03-12 RX ADMIN — OXYCODONE HYDROCHLORIDE AND ACETAMINOPHEN 1 TABLET: 5; 325 TABLET ORAL at 04:22

## 2020-03-12 RX ADMIN — LEVOTHYROXINE SODIUM 100 MCG: 100 TABLET ORAL at 05:41

## 2020-03-12 RX ADMIN — PREDNISONE 5 MG: 5 TABLET ORAL at 05:41

## 2020-03-12 RX ADMIN — ALBUTEROL SULFATE 2 PUFF: 90 AEROSOL, METERED RESPIRATORY (INHALATION) at 16:57

## 2020-03-12 RX ADMIN — OXCARBAZEPINE 150 MG: 150 TABLET, FILM COATED ORAL at 17:54

## 2020-03-12 RX ADMIN — BUSPIRONE HYDROCHLORIDE 10 MG: 10 TABLET ORAL at 17:54

## 2020-03-12 RX ADMIN — ACETAZOLAMIDE 1000 MG: 500 CAPSULE, EXTENDED RELEASE ORAL at 05:41

## 2020-03-12 RX ADMIN — OXYCODONE HYDROCHLORIDE AND ACETAMINOPHEN 1 TABLET: 5; 325 TABLET ORAL at 12:05

## 2020-03-12 RX ADMIN — BUSPIRONE HYDROCHLORIDE 10 MG: 10 TABLET ORAL at 05:41

## 2020-03-12 RX ADMIN — FLUOXETINE HYDROCHLORIDE 40 MG: 20 CAPSULE ORAL at 05:41

## 2020-03-12 RX ADMIN — LIDOCAINE 1 PATCH: 50 PATCH TOPICAL at 05:42

## 2020-03-12 RX ADMIN — LIDOCAINE 1 PATCH: 50 PATCH TOPICAL at 12:04

## 2020-03-12 RX ADMIN — GLYCOPYRROLATE 1 CAPSULE: 15.6 CAPSULE RESPIRATORY (INHALATION) at 18:01

## 2020-03-12 RX ADMIN — ALBUTEROL SULFATE 2.5 MG: 2.5 SOLUTION RESPIRATORY (INHALATION) at 15:16

## 2020-03-12 NOTE — PROGRESS NOTES
"Patient stating: \"I feel like I'm drowning.\"  Assessed oxygenation.  Patient saturating at 94% on room air.  Patient with inspiratory wheezes.  Discussed with MD at bedside.  Called respiratory therapist.  "

## 2020-03-12 NOTE — PROGRESS NOTES
Received bedside report from night shift RN. Assumed care of patient. Pt assessed and stable. VSS. Patient reports 3/10 knee pain at this time.  Patient declines pain medications at this time.  Discussed plan of care for day with patient and received verbal understanding. Call light within reach, bed alarm active, bed in low position.

## 2020-03-12 NOTE — CARE PLAN
Problem: Safety  Goal: Will remain free from injury  Outcome: PROGRESSING AS EXPECTED   Patient educated to call for help. Patient resting in bed at this time. Will continue to monitor.     Problem: Knowledge Deficit  Goal: Knowledge of disease process/condition, treatment plan, diagnostic tests, and medications will improve  Outcome: PROGRESSING AS EXPECTED   Rn discussed plan of care with patient including when medications are due. Patient wishes to get up in the wheelchair. Staff will assist when available.

## 2020-03-12 NOTE — CARE PLAN
"  Problem: Pain Management  Goal: Pain level will decrease to patient's comfort goal  Outcome: PROGRESSING AS EXPECTED  Note: Patient receiving PRN oral pain medications.       Problem: Urinary Elimination:  Goal: Ability to reestablish a normal urinary elimination pattern will improve  Outcome: PROGRESSING AS EXPECTED  Note: Patient has incontinence at baseline and is able to urinate at this time.     Problem: Mobility  Goal: Risk for activity intolerance will decrease  Outcome: PROGRESSING AS EXPECTED  Note: Patient unable to ambulate.  Patient able to use slide board to get into wheelchair with 2 person assistance.  Patient uses a wheelchair at home.     Problem: Respiratory:  Goal: Respiratory status will improve  Outcome: PROGRESSING AS EXPECTED  Note: Patient complaining of \"drowning\" feeling this morning.  Patient wheezy.  Oxygen saturations in the low 90's.  Respiratory therapist called.     "

## 2020-03-12 NOTE — CARE PLAN
Problem: Bronchoconstriction:  Goal: Improve in air movement and diminished wheezing  Outcome: PROGRESSING AS EXPECTED     Albuterol qid, [place] bid

## 2020-03-12 NOTE — PROGRESS NOTES
"Daily Progress Note:     Date of Service: 3/12/2020  Primary Team: [facility] IM White Team   Attending: Sonal Witt M.D.   Senior Resident: Dr. Renetta Wills  Intern: Dr. Goodson  Contact:  [phone]    Chief Complaint:   Bilateral lower extremity weakness    Subjective:    No acute overnight event. Afebrile    This AM patient report she was short of breath, with \" like I am drowning\". She report improvement after respiratory therapy and report improvement after repositioning in bed. Patient report her occipital headache from yesterday has resolved. She was agreeable to her usual acetazolamide dose of 1000mg BID. Her right knee continues to feel better, with increase ROM.    - she requested DME for drop foot  - Discussed with patient we will be transferring her to medical floor. Patient agreeable    Consultants/Specialty:  Neurology  Psychiatry  Review of Systems:       Constitutional: Negative for fever and chills. Report headaches.  Cardiovascular: Negative for chest pain and palpitations  Respiratory: Negative for cough and hemoptysis. Positive SOB  Gastrointestinal: Negative for nausea, negative for abdominal pain, vomiting. Report throat discomfort  Genitourinary: reports dysuria and negative hematuria  Musculoskeletal: Reports joint pain, in bilateral knee and right elbow ( improving)  Neurological: Positive for mild headache, negative for dizziness, no visual changes  Psychiatric: Negative for SI/HI  Objective Data:   Physical Exam:   Vitals:   Temp:  [36.4 °C (97.5 °F)-36.5 °C (97.7 °F)] 36.4 °C (97.5 °F)  Pulse:  [59-76] 68  Resp:  [16-18] 16  BP: ()/(39-64) 94/39  SpO2:  [93 %-99 %] 95 %    Constitutional:       General: She is not in acute distress.     Appearance: Normal appearance. She is obese.   HENT:      Head: Normocephalic and atraumatic.   Eyes:      Extraocular Movements: Extraocular movements intact.      Pupils: Pupils are equal, round, and reactive to light.   Neck: "      Musculoskeletal: Normal range of motion and neck supple.   Cardiovascular:      Rate and Rhythm: Normal rate and regular rhythm.      Heart sounds: Normal heart sounds.   Pulmonary:      Effort: Pulmonary effort is normal. No respiratory distress.      Breath sounds: Normal breath sounds. No expiratory wheezing.   Abdominal:      General: Bowel sounds are normal. There is no distension.      Palpations: Abdomen is soft.   Musculoskeletal:         General: Tenderness present. No swelling or deformity.      Comments: ROM of right knee improved and right elbow improved, left knee improved.  Chronic weakness in bilateral lower extremity   Skin:     General: Skin is warm and dry.      Comments: Slight bruising distal to right knee, anteriorly. No signs of active inflammation , no increase in warm, no significant change from previous admission   Neurological:      Mental Status: She is alert and oriented to person, place, and time. Mental status is at baseline.      Labs:   Recent Labs     03/12/20  0639   WBC 4.8   RBC 3.93*   HEMOGLOBIN 12.1   HEMATOCRIT 35.7*   MCV 90.8   MCH 30.8   RDW 46.5   PLATELETCT 130*   MPV 11.7     Recent Labs     03/10/20  0834 03/10/20  2121 03/12/20  0639   SODIUM 141 139 141   POTASSIUM 3.4* 3.3* 3.3*   CHLORIDE 115* 114* 111   CO2 15* 15* 15*   GLUCOSE 101* 97 102*   BUN 13 16 14       Imaging:   No new imaging    * Leg weakness, bilateral  Assessment & Plan  -Patient caregiver states she is unable to provide safe transfers between chair and bed  -PT states there is no more equipment they can provide to assist with it  -It is likely that patient will require long term care facility placement  -CT spine ruled out any acute pathology   -Patient unable to undergo MRI because of Stimulator in place.     Neurology consult, appreciate recommendations  Neuropthalmology recommendation: continue med :   1. Diamox, 1000 mg p.o. b.i.d.  2. Maintain the prednisone 5 mg p.o. daily.  3. Maintain  the CellCept 1000 mg p.o. b.i.d.  4. Follow up in neurophthalmology clinic at discharge  Psychiatric consult, continue psychiatric medication  Q4 neuro checks.   Pain control for LBP, right knee pain  Pending placement      Intracranial pressure increased  Assessment & Plan  -Clinically stable.   -Headache with whooshing sounds  -Patient was previously on Lasix started at dose of 20mg in 2017 for bilateral lower extremity edema, and has been on lasix for several years. Her lasix 80mg daily was discontinued upon discharge from hospital 02/25/2020.   -Patient was started on Diamox / filled diamox 02/27/20 outpatient , dose increased while patient was inpatient during this hospitalization to 1000mg BID per neurology    Plan :  Resumed 1000mg Diamox         TONYA (obstructive sleep apnea)- (present on admission)  Assessment & Plan  Not on CPAP at home. Patient has not followed up with doctor yet for the same.   Follow up outpatient.     UTI (urinary tract infection)  Assessment & Plan  History of dysuria,  UA: Cloudy urine, nitrite positive, leukocyte esterase small, WBC 20-50, bacteria many  Urine culture: Lactose fermenting Gram negative zita   >100,000 cfu/mL -Klebsiella pneumoniae sensitive to bactrim.  Got Bactrim for 3 days  - UA 03/11/20 with trace LE, negative nitrite and mod epithelial cell, low WBC, likely skin letty contaminate. Will wait for rflx cx    DM (diabetes mellitus) ([facility])- (present on admission)  Assessment & Plan  A1C 6.0 (08/2019)  Continue home meds.   Dulaglutide injection every Friday .   Diabetic diet.    Joint pain  Assessment & Plan  Significantly improved.   Patient report slipped off bed while in sitting position and landed on her knees and right elbow  She has significant pain on right knee upon palpation or movement  Given mechanism of injury will consider conservative management with rest, ice and lidocaine patch for right knee  Xray of R knee without bony abnormality or joint  "effusion  PLAN  Continue Ibuprofen 400mg Q6H Prn ordered for 3 days for acute episode of pain in right knee  Continue lidocaine patch and conservative management    Asthma- (present on admission)  Assessment & Plan  Continue home meds  Start montelukast and glycopyrronium bromide as per RT rec  Reported \"feeling like drowning\" improved after RT and bed positioning.   Saturating well on room air.     Hypothyroidism- (present on admission)  Assessment & Plan  TSH 5.65  Increased Levothyroxine to 100mcg on 3/3  Continue levothyroxine, Patient will need to followed up outpatient in 1-2 month (05/2020)        "

## 2020-03-12 NOTE — DISCHARGE PLANNING
"Anticipated Disposition:  Placement-Group Home     Action:   Pt requested to Speak with this CM regarding discharge, she stated\" I can not stay here everyone is getting sick here, I do not want to get sick.\" this CM informed her, it will take awhile to find a place. She voiced understanding.     This CM called [facility], it charges almost $4000 /month and private pay only.          Barriers to Discharge:   financial barrier   Too young for most group homes      Plan:  Follow up with group homes for acceptance.     "

## 2020-03-13 PROBLEM — K13.79 PAIN IN MOUTH: Status: ACTIVE | Noted: 2020-03-13

## 2020-03-13 PROBLEM — K14.0 GLOSSITIS: Status: ACTIVE | Noted: 2020-03-13

## 2020-03-13 LAB
ANION GAP SERPL CALC-SCNC: 10 MMOL/L (ref 7–16)
BASOPHILS # BLD AUTO: 0.4 % (ref 0–1.8)
BASOPHILS # BLD: 0.02 K/UL (ref 0–0.12)
BUN SERPL-MCNC: 13 MG/DL (ref 8–22)
CALCIUM SERPL-MCNC: 9.5 MG/DL (ref 8.5–10.5)
CHLORIDE SERPL-SCNC: 115 MMOL/L (ref 96–112)
CO2 SERPL-SCNC: 14 MMOL/L (ref 20–33)
CREAT SERPL-MCNC: 0.85 MG/DL (ref 0.5–1.4)
EOSINOPHIL # BLD AUTO: 0.08 K/UL (ref 0–0.51)
EOSINOPHIL NFR BLD: 1.5 % (ref 0–6.9)
ERYTHROCYTE [DISTWIDTH] IN BLOOD BY AUTOMATED COUNT: 47.9 FL (ref 35.9–50)
GLUCOSE SERPL-MCNC: 95 MG/DL (ref 65–99)
HCT VFR BLD AUTO: 36.1 % (ref 37–47)
HGB BLD-MCNC: 11.9 G/DL (ref 12–16)
IMM GRANULOCYTES # BLD AUTO: 0.02 K/UL (ref 0–0.11)
IMM GRANULOCYTES NFR BLD AUTO: 0.4 % (ref 0–0.9)
LYMPHOCYTES # BLD AUTO: 2.07 K/UL (ref 1–4.8)
LYMPHOCYTES NFR BLD: 37.6 % (ref 22–41)
MCH RBC QN AUTO: 30.3 PG (ref 27–33)
MCHC RBC AUTO-ENTMCNC: 33 G/DL (ref 33.6–35)
MCV RBC AUTO: 91.9 FL (ref 81.4–97.8)
MONOCYTES # BLD AUTO: 0.31 K/UL (ref 0–0.85)
MONOCYTES NFR BLD AUTO: 5.6 % (ref 0–13.4)
NEUTROPHILS # BLD AUTO: 3.01 K/UL (ref 2–7.15)
NEUTROPHILS NFR BLD: 54.5 % (ref 44–72)
NRBC # BLD AUTO: 0 K/UL
NRBC BLD-RTO: 0 /100 WBC
PLATELET # BLD AUTO: 160 K/UL (ref 164–446)
PMV BLD AUTO: 12.1 FL (ref 9–12.9)
POTASSIUM SERPL-SCNC: 3.2 MMOL/L (ref 3.6–5.5)
RBC # BLD AUTO: 3.93 M/UL (ref 4.2–5.4)
SODIUM SERPL-SCNC: 139 MMOL/L (ref 135–145)
WBC # BLD AUTO: 5.5 K/UL (ref 4.8–10.8)

## 2020-03-13 PROCEDURE — 700102 HCHG RX REV CODE 250 W/ 637 OVERRIDE(OP): Performed by: STUDENT IN AN ORGANIZED HEALTH CARE EDUCATION/TRAINING PROGRAM

## 2020-03-13 PROCEDURE — 36415 COLL VENOUS BLD VENIPUNCTURE: CPT

## 2020-03-13 PROCEDURE — 700102 HCHG RX REV CODE 250 W/ 637 OVERRIDE(OP): Performed by: PSYCHIATRY & NEUROLOGY

## 2020-03-13 PROCEDURE — 85025 COMPLETE CBC W/AUTO DIFF WBC: CPT

## 2020-03-13 PROCEDURE — A9270 NON-COVERED ITEM OR SERVICE: HCPCS | Performed by: STUDENT IN AN ORGANIZED HEALTH CARE EDUCATION/TRAINING PROGRAM

## 2020-03-13 PROCEDURE — 80048 BASIC METABOLIC PNL TOTAL CA: CPT

## 2020-03-13 PROCEDURE — 700101 HCHG RX REV CODE 250: Performed by: INTERNAL MEDICINE

## 2020-03-13 PROCEDURE — 94640 AIRWAY INHALATION TREATMENT: CPT

## 2020-03-13 PROCEDURE — 700101 HCHG RX REV CODE 250: Performed by: STUDENT IN AN ORGANIZED HEALTH CARE EDUCATION/TRAINING PROGRAM

## 2020-03-13 PROCEDURE — 700102 HCHG RX REV CODE 250 W/ 637 OVERRIDE(OP): Performed by: INTERNAL MEDICINE

## 2020-03-13 PROCEDURE — 99225 PR SUBSEQUENT OBSERVATION CARE,LEVEL II: CPT | Mod: GC | Performed by: INTERNAL MEDICINE

## 2020-03-13 PROCEDURE — G0378 HOSPITAL OBSERVATION PER HR: HCPCS

## 2020-03-13 PROCEDURE — A9270 NON-COVERED ITEM OR SERVICE: HCPCS | Performed by: INTERNAL MEDICINE

## 2020-03-13 PROCEDURE — 700111 HCHG RX REV CODE 636 W/ 250 OVERRIDE (IP): Performed by: STUDENT IN AN ORGANIZED HEALTH CARE EDUCATION/TRAINING PROGRAM

## 2020-03-13 PROCEDURE — 700111 HCHG RX REV CODE 636 W/ 250 OVERRIDE (IP): Performed by: INTERNAL MEDICINE

## 2020-03-13 PROCEDURE — 96372 THER/PROPH/DIAG INJ SC/IM: CPT

## 2020-03-13 PROCEDURE — A9270 NON-COVERED ITEM OR SERVICE: HCPCS | Performed by: PSYCHIATRY & NEUROLOGY

## 2020-03-13 RX ADMIN — BUDESONIDE AND FORMOTEROL FUMARATE DIHYDRATE 2 PUFF: 160; 4.5 AEROSOL RESPIRATORY (INHALATION) at 17:56

## 2020-03-13 RX ADMIN — GLYCOPYRROLATE 1 CAPSULE: 15.6 CAPSULE RESPIRATORY (INHALATION) at 19:29

## 2020-03-13 RX ADMIN — POLYETHYLENE GLYCOL (3350) 1 PACKET: 17 POWDER, FOR SOLUTION ORAL at 04:37

## 2020-03-13 RX ADMIN — ALBUTEROL SULFATE 2.5 MG: 2.5 SOLUTION RESPIRATORY (INHALATION) at 07:22

## 2020-03-13 RX ADMIN — GLYCOPYRROLATE 1 CAPSULE: 15.6 CAPSULE RESPIRATORY (INHALATION) at 07:22

## 2020-03-13 RX ADMIN — BUSPIRONE HYDROCHLORIDE 10 MG: 10 TABLET ORAL at 04:39

## 2020-03-13 RX ADMIN — PREGABALIN 300 MG: 100 CAPSULE ORAL at 04:38

## 2020-03-13 RX ADMIN — FLUOXETINE HYDROCHLORIDE 40 MG: 20 CAPSULE ORAL at 04:38

## 2020-03-13 RX ADMIN — LIDOCAINE 1 PATCH: 50 PATCH TOPICAL at 04:37

## 2020-03-13 RX ADMIN — PREDNISONE 5 MG: 5 TABLET ORAL at 04:39

## 2020-03-13 RX ADMIN — OXYCODONE HYDROCHLORIDE AND ACETAMINOPHEN 1 TABLET: 5; 325 TABLET ORAL at 04:54

## 2020-03-13 RX ADMIN — TRAZODONE HYDROCHLORIDE 50 MG: 100 TABLET ORAL at 18:01

## 2020-03-13 RX ADMIN — OXCARBAZEPINE 150 MG: 150 TABLET, FILM COATED ORAL at 17:58

## 2020-03-13 RX ADMIN — BUDESONIDE AND FORMOTEROL FUMARATE DIHYDRATE 2 PUFF: 160; 4.5 AEROSOL RESPIRATORY (INHALATION) at 04:33

## 2020-03-13 RX ADMIN — MYCOPHENOLATE MOFETIL 1000 MG: 250 CAPSULE ORAL at 18:11

## 2020-03-13 RX ADMIN — MYCOPHENOLATE MOFETIL 1000 MG: 250 CAPSULE ORAL at 05:15

## 2020-03-13 RX ADMIN — OXCARBAZEPINE 150 MG: 150 TABLET, FILM COATED ORAL at 04:39

## 2020-03-13 RX ADMIN — LIDOCAINE 1 PATCH: 50 PATCH TOPICAL at 12:31

## 2020-03-13 RX ADMIN — ALBUTEROL SULFATE 2.5 MG: 2.5 SOLUTION RESPIRATORY (INHALATION) at 14:46

## 2020-03-13 RX ADMIN — ALBUTEROL SULFATE 2.5 MG: 2.5 SOLUTION RESPIRATORY (INHALATION) at 11:05

## 2020-03-13 RX ADMIN — SENNOSIDES AND DOCUSATE SODIUM 2 TABLET: 8.6; 5 TABLET ORAL at 18:01

## 2020-03-13 RX ADMIN — ALBUTEROL SULFATE 2.5 MG: 2.5 SOLUTION RESPIRATORY (INHALATION) at 19:20

## 2020-03-13 RX ADMIN — OXYCODONE HYDROCHLORIDE AND ACETAMINOPHEN 1 TABLET: 5; 325 TABLET ORAL at 11:17

## 2020-03-13 RX ADMIN — IBUPROFEN 400 MG: 800 TABLET, FILM COATED ORAL at 04:53

## 2020-03-13 RX ADMIN — ACETAZOLAMIDE 1000 MG: 500 CAPSULE, EXTENDED RELEASE ORAL at 04:41

## 2020-03-13 RX ADMIN — ASPIRIN 81 MG: 81 TABLET, COATED ORAL at 04:39

## 2020-03-13 RX ADMIN — IBUPROFEN 400 MG: 800 TABLET, FILM COATED ORAL at 11:18

## 2020-03-13 RX ADMIN — ACETAZOLAMIDE 1000 MG: 500 CAPSULE, EXTENDED RELEASE ORAL at 18:02

## 2020-03-13 RX ADMIN — SENNOSIDES AND DOCUSATE SODIUM 2 TABLET: 8.6; 5 TABLET ORAL at 04:39

## 2020-03-13 RX ADMIN — BUSPIRONE HYDROCHLORIDE 10 MG: 10 TABLET ORAL at 18:02

## 2020-03-13 RX ADMIN — ZIPRASIDONE HYDROCHLORIDE 40 MG: 40 CAPSULE ORAL at 04:39

## 2020-03-13 RX ADMIN — LEVOTHYROXINE SODIUM 100 MCG: 100 TABLET ORAL at 04:39

## 2020-03-13 RX ADMIN — PREGABALIN 300 MG: 100 CAPSULE ORAL at 18:02

## 2020-03-13 RX ADMIN — ZIPRASIDONE HYDROCHLORIDE 40 MG: 40 CAPSULE ORAL at 17:58

## 2020-03-13 RX ADMIN — ENOXAPARIN SODIUM 40 MG: 100 INJECTION SUBCUTANEOUS at 04:37

## 2020-03-13 ASSESSMENT — ENCOUNTER SYMPTOMS
NAUSEA: 0
ABDOMINAL PAIN: 0
SHORTNESS OF BREATH: 1
HEMOPTYSIS: 0
VOMITING: 0
FEVER: 0
COUGH: 0
HEADACHES: 1
PALPITATIONS: 0
CHILLS: 0

## 2020-03-13 NOTE — ASSESSMENT & PLAN NOTE
-Patient c/o burning sensation of tongue, due to this she doesn't feel like eating.   -No Erythema on tongue and roof of mouth on examination , neither any white plaque noted.   - Unknown etiology, ddx iatrogenic, nutitional  - albuterol induced vs symbicor induced   - B12 300s 02/2020, folate WNL ( 2019) , ferritin 100s (2018)  Plan  - Continue using BMX before meal,   - Encourage oral hydration, rinse mouth after symbicort use  - Discontinue seebri  -Discussed with pharmacy, she is on multiple medications that can affect her immunity for can cause these symptoms, will keep monitoring.

## 2020-03-13 NOTE — CARE PLAN
Problem: Safety  Goal: Will remain free from falls  Outcome: PROGRESSING AS EXPECTED  Note: Bed alarm on, call light within reach, non skid socks applied.      Problem: Discharge Barriers/Planning  Goal: Patient's continuum of care needs will be met  Outcome: PROGRESSING AS EXPECTED  Note: Pt updated on plan of care. Care coordination involved.

## 2020-03-13 NOTE — PROGRESS NOTES
Daily Progress Note:     Date of Service: 3/13/2020  Primary Team: [facility] IM White Team   Attending: Sonal Witt M.D.   Senior Resident: Dr. Horton  Intern: Dr. Goodson  Contact:  [phone]    Chief Complaint:   Bilateral lower extremity weakness    Subjective    Patient was seen this morning, asleep, comfortable, with regular work of breathing. She reports she continues to have bilateral lower extremity weakness ( chronic), her right knee pain continues to improve, and is well controlled with lidocaine, and she has increase range of motion. She can use both her upper extremity to rotate her body to help with respiratory exam. Today she complains feeling like her chest wall muscles is tired from working from breathing. Discuss with patient to continue respiratory therapy, and her vitals shows she is saturating well on room air. Additionally, she noted pain on her tongue, on examination show erythema in tongue and roof of mouth. No white plaque.   - will review medications for possible cause of glossitis  - asked nurse to provide oral care, encourage oral hydration, rinse mouth after symbicort  - will space out laboratory studies  - CM working with family to plan for discharge to group home    Consultants/Specialty:  Neurology  Psychiatry  Review of Systems:    Review of Systems   Constitutional: Negative for chills and fever.   Eyes:        Chronic visual changes   Respiratory: Positive for shortness of breath. Negative for cough and hemoptysis.         Report short of breath and feeling like muscle is tired    Cardiovascular: Negative for chest pain, palpitations and leg swelling.   Gastrointestinal: Negative for abdominal pain, nausea and vomiting.   Genitourinary:        Reports warm/burning sensation during urination   Musculoskeletal: Positive for joint pain.   Neurological: Positive for headaches.        Chronic wooshing, chronic bilateral lower extremity weakness         Objective Data:   Physical Exam:    Vitals:   Temp:  [36.1 °C (97 °F)-36.7 °C (98 °F)] 36.6 °C (97.9 °F)  Pulse:  [79-88] 87  Resp:  [16-20] 16  BP: (109-133)/(53-89) 109/53  SpO2:  [93 %-98 %] 93 %    Constitutional:       General: She is not in acute distress.     Appearance: Normal appearance. She is obese.   HENT:      Head: Normocephalic and atraumatic. Mouth, erythema in tongue and room of mouth without white plaque  Eyes:      Extraocular Movements: Extraocular movements intact.      Pupils: Pupils are equal, round, and reactive to light.   Neck:      Musculoskeletal: Normal range of motion and neck supple.   Cardiovascular:      Rate and Rhythm: Normal rate and regular rhythm.      Heart sounds: Normal heart sounds.   Pulmonary:      Effort: Pulmonary effort is normal. No respiratory distress.      Breath sounds: Normal breath sounds. No expiratory wheezing.   Abdominal:      General: Bowel sounds are normal. There is no distension.      Palpations: Abdomen is soft.   Musculoskeletal:         General: Tenderness present. No swelling or deformity.      Comments: ROM of right knee improved and right elbow improved, left knee improved.  Chronic weakness in bilateral lower extremity   Skin:     General: Skin is warm and dry.      Comments: Slight bruising distal to right knee, anteriorly. No signs of active inflammation , no increase in warm, no significant change from previous admission   Neurological:      Mental Status: She is alert and oriented to person, place, and time. Mental status is at baseline.         Labs:   Recent Labs     03/12/20  0639 03/13/20  0339   WBC 4.8 5.5   RBC 3.93* 3.93*   HEMOGLOBIN 12.1 11.9*   HEMATOCRIT 35.7* 36.1*   MCV 90.8 91.9   MCH 30.8 30.3   RDW 46.5 47.9   PLATELETCT 130* 160*   MPV 11.7 12.1   NEUTSPOLYS  --  54.50   LYMPHOCYTES  --  37.60   MONOCYTES  --  5.60   EOSINOPHILS  --  1.50   BASOPHILS  --  0.40     Recent Labs     03/10/20  2121 03/12/20  0639 03/13/20  0339   SODIUM 139 141 139   POTASSIUM  3.3* 3.3* 3.2*   CHLORIDE 114* 111 115*   CO2 15* 15* 14*   GLUCOSE 97 102* 95   BUN 16 14 13       Imaging:   No new imaging    * Leg weakness, bilateral  Assessment & Plan  -Patient caregiver states she is unable to provide safe transfers between chair and bed  -PT states there is no more equipment they can provide to assist with it  -It is likely that patient will require long term care facility placement  -CT spine ruled out any acute pathology   -Patient unable to undergo MRI because of Stimulator in place.     Neurology consult, appreciate recommendations  Neuropthalmology recommendation: continue med :   1. Diamox, 1000 mg p.o. b.i.d.  2. Maintain the prednisone 5 mg p.o. daily.  3. Maintain the CellCept 1000 mg p.o. b.i.d.  4. Follow up in neurophthalmology clinic at discharge  Psychiatric consult, continue psychiatric medication  Q4 neuro checks.   Pain control for LBP, right knee pain  Pending placement      Glossitis  Assessment & Plan  - Erythema on tongue and roof of mouth on examination without white plaque  - Glossitis, unknown etiology, ddx iatrogenic, nutitional  - albuterol induced glossitis vs symbicor induced thrush  - B12 300s 02/2020, folate WNL ( 2019) , ferritin 100s (2018)  Plan  - conservative treatment with care  - encourage oral hydration, rinse mouth after symbicort use  - continue to monitor    Intracranial pressure increased  Assessment & Plan  -Clinically stable.   -Headache with whooshing sounds  -Patient was previously on Lasix started at dose of 20mg in 2017 for bilateral lower extremity edema, and has been on lasix for several years. Her lasix 80mg daily was discontinued upon discharge from hospital 02/25/2020.   -Patient was started on Diamox / filled diamox 02/27/20 outpatient , dose increased while patient was inpatient during this hospitalization to 1000mg BID per neurology    Plan :  Resumed 1000mg Diamox         TONYA (obstructive sleep apnea)- (present on  "admission)  Assessment & Plan  Not on CPAP at home. Patient has not followed up with doctor yet for the same.   Follow up outpatient.     UTI (urinary tract infection)  Assessment & Plan  History of dysuria,  UA: Cloudy urine, nitrite positive, leukocyte esterase small, WBC 20-50, bacteria many  Urine culture: Lactose fermenting Gram negative zita   >100,000 cfu/mL -Klebsiella pneumoniae sensitive to bactrim.  Got Bactrim for 3 days  - UA 03/11/20 with trace LE, negative nitrite and mod epithelial cell, low WBC, likely skin letty contaminate. Will wait for rflx cx    DM (diabetes mellitus) ([facility])- (present on admission)  Assessment & Plan  A1C 6.0 (08/2019)  Continue home meds.   Dulaglutide injection every Friday .   Diabetic diet.    Joint pain  Assessment & Plan  Significantly improved.   Patient report slipped off bed while in sitting position and landed on her knees and right elbow  She has significant pain on right knee upon palpation or movement  Given mechanism of injury will consider conservative management with rest, ice and lidocaine patch for right knee  Xray of R knee without bony abnormality or joint effusion  PLAN  Continue Ibuprofen 400mg Q6H Prn ordered for 3 days for acute episode of pain in right knee  Continue lidocaine patch and conservative management    Asthma- (present on admission)  Assessment & Plan  Continue home meds  Start montelukast and glycopyrronium bromide as per RT rec  Reported \"feeling like drowning\" improved after RT and bed positioning.   Saturating well on room air.     Hypothyroidism- (present on admission)  Assessment & Plan  TSH 5.65  Increased Levothyroxine to 100mcg on 3/3  Continue levothyroxine, Patient will need to followed up outpatient in 1-2 month (05/2020)        "

## 2020-03-13 NOTE — DISCHARGE SUMMARY
Discharge Summary      Date of Admission: 2/27/2020  Date of Discharge: 3/24/2020  Discharging Attending: Liu Schafer M.D.   Discharging Senior Resident: Dr. Michele  Discharging Intern: Dr. Kilgore    CHIEF COMPLAINT ON ADMISSION  Chief Complaint   Patient presents with   • Back Pain   • Ankle Pain     bilateral ankle pain after fall       Reason for Admission  Bilateral lower extremities weakness    Admission Date  2/27/2020    CODE STATUS  DNAR, I OK    HPI & HOSPITAL COURSE  This is a 29 y.o. female here with Hx of optic neuritis on Diamox, possible functional transverse myelitis, diabetes mellitus, asthma, TONYA, hypothyroidism and schizoaffective disorder who was admitted to hospital from (02/23/20-02/27/20) for flareup of optic neuritis and elevated intracranial pressure s/p a lumbar puncture. He was started on Diamox for elevated opening pressure of 52ahH6M. Patient presented to Emergency room after a fall at home 02/27/20. CT spine, xray of b/l ankle, blood work up negative.    Patient was evaluated by neurologist, Dr. Yousif, acetazolamide dose was increased to 1000mg BID for control of  Idiopathic intracranial hypertension. Psychiatry was consulted for evaluation of concomitant conversion disorder for lower extremity weakness and attributes bilaterall lower extremity weakness. Pt continues to be improving clinically, regained lower extremities strength, burning pain in mouth significantly improved.     Her inpatient Diamox was decreased to 750 mg BID. First couple days patient tolerated well and after that he was diagnosed with NAG metabolic acidosis likely due to increased dose of Diamox. On discharge decreased dose of Diamox to 750 mg BID and follow outpatient    Neuro-Ophthalmology in 1-2 weeks for further dose adjustment.    She is encouraged to follow-up with her PCP in 1 to 2 weeks.       Therefore, she is discharged in good and stable condition.    The patient met 2-midnight criteria for an inpatient  stay at the time of discharge.    Discharge Date  3/24/2020    FOLLOW UP ITEMS POST DISCHARGE  Neurology   Neurophthalmology clinic.  Psychiatry    DISCHARGE DIAGNOSES  Principal Problem (Resolved):    Leg weakness, bilateral POA: Yes  Active Problems:    Optic neuritis POA: Yes    Joint pain POA: Yes    DM (diabetes mellitus) ([facility]) POA: Yes    TONYA (obstructive sleep apnea) POA: Yes    Metabolic acidosis, normal anion gap (NAG) likely due to Diamox POA: No    Hypothyroidism (Chronic) POA: Yes    Asthma POA: Yes  Resolved Problems:    Numbness POA: Yes    Intracranial pressure increased POA: Yes    Glossodynia POA: No      FOLLOW UP  Future Appointments   Date Time Provider Department Center   3/26/2020  2:00 PM Isidro Park M.D. [address]   4/16/2020  2:10 PM Ignacia Herrera M.D. [facility] None     Neuro opthal  with Dr. Yousif   Schedule an appointment as soon as possible for a visit in 2 weeks  Discuss Dose adjustment of Diamox       MEDICATIONS ON DISCHARGE     Medication List      START taking these medications      Instructions   acetaZOLAMIDE 250 MG Tabs  Commonly known as:  DIAMOX  Replaces:  acetaZOLAMIDE  MG Cp12   Doctor's comments:  Decreased to 750 mg BID (decreased because causing NAG metabolic acidosis- HCO3= 15  Take 2 Tabs by mouth 2 times a day AND 1 Tab 2 times a day.     Cholecalciferol 1000 UNIT Tabs  Start taking on:  March 25, 2020  Commonly known as:  VITAMIND D3   Doctor's comments:  Follow PCP and Recheck  Take 1 Tab by mouth every day.  Dose:  1,000 Units     modafinil 200 MG Tabs  Start taking on:  March 25, 2020  Commonly known as:  PROVIGIL   Take 1 Tab by mouth every morning for 60 days.  Dose:  200 mg        CHANGE how you take these medications      Instructions   levothyroxine 100 MCG Tabs  Start taking on:  March 25, 2020  What changed:    · medication strength  · how much to take  Commonly known as:  SYNTHROID   Take 1 Tab by mouth Every morning on an empty  stomach.  Dose:  100 mcg     predniSONE 5 MG Tbec  What changed:  how much to take   Take 5 mg by mouth every day.  Dose:  5 mg        CONTINUE taking these medications      Instructions   albuterol 108 (90 Base) MCG/ACT Aers inhalation aerosol   Inhale 2 Puffs by mouth every 6 hours as needed for Shortness of Breath.  Dose:  2 Puff     aspirin EC 81 MG Tbec  Commonly known as:  ECOTRIN   Take 81 mg by mouth every day.  Dose:  81 mg     budesonide-formoterol 160-4.5 MCG/ACT Aero  Commonly known as:  SYMBICORT   Inhale 2 Puffs by mouth 2 Times a Day.  Dose:  2 Puff     busPIRone 10 MG Tabs tablet  Commonly known as:  BUSPAR   TAKE ONE TABLET BY MOUTH TWICE DAILY     CellCept 500 MG tablet  Generic drug:  mycophenolate   Take 1,000 mg by mouth 2 times a day.  Dose:  1,000 mg     Corlanor 7.5 MG Tabs  Generic drug:  Ivabradine HCl   Take 7.5 mg by mouth 2 Times a Day.  Dose:  7.5 mg     fluoxetine 40 MG capsule  Commonly known as:  PROZAC   TAKE ONE CAPSULE BY MOUTH ONCE A DAY     furosemide 80 MG Tabs  Commonly known as:  LASIX   Take 80 mg by mouth 1 time daily as needed.  Dose:  80 mg     ipratropium-albuterol 0.5-2.5 (3) MG/3ML nebulizer solution  Commonly known as:  DUONEB   3 mL by Nebulization route every 6 hours as needed for Shortness of Breath.  Dose:  3 mL     Lyrica 300 MG capsule  Generic drug:  pregabalin   Take 300 mg by mouth 2 times a day.  Dose:  300 mg     Melatonin 10 MG Tabs   Take 10 mg by mouth every evening.  Dose:  10 mg     Nexplanon 68 MG Impl implant  Generic drug:  etonogestrel   Inject 1 Each as instructed Once.  Dose:  1 Each     ondansetron 4 MG Tbdp  Commonly known as:  ZOFRAN ODT   Take 4 mg by mouth every 6 hours as needed for Nausea.  Dose:  4 mg     OXcarbazepine 150 MG Tabs  Commonly known as:  TRILEPTAL   Take 1 Tab by mouth 2 Times a Day.  Dose:  150 mg     potassium chloride SA 20 MEQ Tbcr  Commonly known as:  Kdur   Take 20 mEq by mouth 2 times a day.  Dose:  20 mEq    "  promethazine 25 MG Supp  Commonly known as:  PHENERGAN   Insert 1 Suppository in rectum every 6 hours as needed for Nausea/Vomiting.  Dose:  25 mg     senna-docusate 8.6-50 MG Tabs  Commonly known as:  PERICOLACE or SENOKOT S   Take 2 Tabs by mouth 2 times a day as needed for Constipation.  Dose:  2 Tab     tiotropium 18 MCG Caps  Commonly known as:  SPIRIVA   Inhale 1 Cap by mouth every day.  Dose:  18 mcg     traZODone 100 MG Tabs  Commonly known as:  DESYREL   Take 0.5 Tabs by mouth every evening.  Dose:  50 mg     Trulicity 1.5 MG/0.5ML Sopn  Generic drug:  Dulaglutide   Inject 1.5 mg as instructed every Friday.  Dose:  1.5 mg     Voltaren 1 % Gel  Generic drug:  Diclofenac Sodium   Apply 1 Application to skin as directed 4 times a day as needed (on wrists, back, ankles, and feet).  Dose:  1 Application     ziprasidone 40 MG Caps  Commonly known as:  GEODON   TAKE ONE CAPSULE BY MOUTH TWICE DAILY        STOP taking these medications    acetaZOLAMIDE  MG Cp12  Commonly known as:  DIAMOX  Replaced by:  acetaZOLAMIDE 250 MG Tabs     azithromycin 250 MG Tabs  Commonly known as:  ZITHROMAX            Allergies  Allergies   Allergen Reactions   • Cefdinir Shortness of Breath and Itching     Tolerated 1/18/17  Tolerates ceftriaxone  Tolerated augmentin 8/2019    • Depakote [Divalproex Sodium] Unspecified     Muscle spasms/muscle pain and weakness     • Doxycycline Anaphylaxis and Vomiting     RXN=unknown   • Amitriptyline Unspecified     Headaches     • Aripiprazole [Abilify] Unspecified     Headaches/muscle twitching     • Clindamycin Nausea     Even with food     • Flagyl [Metronidazole Hcl] Unspecified     \"eye problems\"     • Flomax [Tamsulosin Hydrochloride] Swelling   • Levaquin Unspecified     Severe muscle cramps in legs  RXN=unknown   • Metformin Unspecified     Increased lactic acid      • Tape Rash     Tears skin off  coban with Tegaderm tape ok intermittently  RXN=ongoing   • Vancomycin Itching     " Pt becomes flushed in face and chest.   RXN=7/10/16   • Wound Dressing Adhesive Hives     By pt report   • Ciprofloxacin    • Depakote  [Divalproex Sodium]    • Hydromorphone Hcl    • Kdc:Metronidazole+Tartrazine    • Keflex Rash     Pt states she gets a rash with this medication  Tolerates ceftriaxone   • Levofloxacin Unspecified     Leg muscle cramps   • Penicillins        DIET  Orders Placed This Encounter   Procedures   • Diet Order Diabetic     Standing Status:   Standing     Number of Occurrences:   1     Order Specific Question:   Diet:     Answer:   Diabetic [3]       ACTIVITY  As tolerated.  Weight bearing as tolerated    CONSULTATIONS  Dr. cobb with Neurology Service consulted.  Treatment options were discussed and plan of care agreed upon.  Dr. Andrew Cobb- Neurologist   Dr. Yousif- Neuro Opth    PROCEDURES  Midline placed for IV steroids.     Review of Systems   Constitutional: Negative for chills, fever, malaise/fatigue and weight loss.   HENT: Negative for ear discharge, ear pain, hearing loss, nosebleeds and tinnitus.    Eyes: Negative.    Respiratory: Negative for cough, hemoptysis, sputum production, shortness of breath and wheezing.    Cardiovascular: Negative for chest pain, palpitations, orthopnea, claudication and leg swelling.   Gastrointestinal: Negative for abdominal pain, diarrhea, heartburn, nausea and vomiting.   Genitourinary: Negative for dysuria, frequency and urgency.   Musculoskeletal: Positive for back pain and joint pain (BL LE weakness-chronic (improving)). Negative for myalgias and neck pain.   Skin: Negative for itching and rash.   Neurological: Negative for dizziness, tremors, sensory change, speech change, focal weakness and headaches.   Psychiatric/Behavioral: Negative.      Physical Exam  Vitals signs and nursing note reviewed.   Constitutional:       General: She is not in acute distress.     Appearance: Normal appearance. She is obese. She is not toxic-appearing.   HENT:       Head: Normocephalic and atraumatic.      Mouth/Throat:      Mouth: Mucous membranes are moist.      Pharynx: Oropharynx is clear. No oropharyngeal exudate or posterior oropharyngeal erythema.   Neck:      Musculoskeletal: Normal range of motion.   Cardiovascular:      Rate and Rhythm: Normal rate and regular rhythm.      Pulses: Normal pulses.      Heart sounds: Normal heart sounds.   Pulmonary:      Effort: Pulmonary effort is normal. No respiratory distress.      Breath sounds: Normal breath sounds.   Abdominal:      General: There is no distension.      Tenderness: There is no abdominal tenderness.   Musculoskeletal: Normal range of motion.         General: No swelling or tenderness.      Right lower leg: No edema.      Left lower leg: No edema.   Neurological:      Mental Status: She is alert and oriented to person, place, and time.      Motor: Motor function is intact.      Comments: Mental Status: She is alert and oriented to person, place, and time. Mental status is at baseline.    RUE/LUE 4/5 strength  LLE did not appreciate patellar reflex, chronic weakness, cannot raise against gravity  RLE patellar reflex intact, chronic weakness, cannot raise against gravity   Psychiatric: Affect normal.

## 2020-03-14 PROCEDURE — 700101 HCHG RX REV CODE 250: Performed by: INTERNAL MEDICINE

## 2020-03-14 PROCEDURE — A9270 NON-COVERED ITEM OR SERVICE: HCPCS | Performed by: INTERNAL MEDICINE

## 2020-03-14 PROCEDURE — 700101 HCHG RX REV CODE 250: Performed by: STUDENT IN AN ORGANIZED HEALTH CARE EDUCATION/TRAINING PROGRAM

## 2020-03-14 PROCEDURE — A6213 FOAM DRG >16<=48 SQ IN W/BDR: HCPCS | Performed by: INTERNAL MEDICINE

## 2020-03-14 PROCEDURE — A9270 NON-COVERED ITEM OR SERVICE: HCPCS | Performed by: STUDENT IN AN ORGANIZED HEALTH CARE EDUCATION/TRAINING PROGRAM

## 2020-03-14 PROCEDURE — 700102 HCHG RX REV CODE 250 W/ 637 OVERRIDE(OP): Performed by: PSYCHIATRY & NEUROLOGY

## 2020-03-14 PROCEDURE — A9270 NON-COVERED ITEM OR SERVICE: HCPCS | Performed by: PSYCHIATRY & NEUROLOGY

## 2020-03-14 PROCEDURE — 700111 HCHG RX REV CODE 636 W/ 250 OVERRIDE (IP): Performed by: INTERNAL MEDICINE

## 2020-03-14 PROCEDURE — 700111 HCHG RX REV CODE 636 W/ 250 OVERRIDE (IP): Performed by: STUDENT IN AN ORGANIZED HEALTH CARE EDUCATION/TRAINING PROGRAM

## 2020-03-14 PROCEDURE — 700102 HCHG RX REV CODE 250 W/ 637 OVERRIDE(OP): Performed by: STUDENT IN AN ORGANIZED HEALTH CARE EDUCATION/TRAINING PROGRAM

## 2020-03-14 PROCEDURE — 700102 HCHG RX REV CODE 250 W/ 637 OVERRIDE(OP): Performed by: INTERNAL MEDICINE

## 2020-03-14 PROCEDURE — 94640 AIRWAY INHALATION TREATMENT: CPT

## 2020-03-14 PROCEDURE — G0378 HOSPITAL OBSERVATION PER HR: HCPCS

## 2020-03-14 PROCEDURE — 96372 THER/PROPH/DIAG INJ SC/IM: CPT

## 2020-03-14 RX ORDER — LIDOCAINE HYDROCHLORIDE 20 MG/ML
15 SOLUTION OROPHARYNGEAL EVERY 12 HOURS PRN
Status: DISPENSED | OUTPATIENT
Start: 2020-03-14 | End: 2020-03-15

## 2020-03-14 RX ORDER — IBUPROFEN 800 MG/1
400 TABLET ORAL EVERY 6 HOURS PRN
Status: DISCONTINUED | OUTPATIENT
Start: 2020-03-14 | End: 2020-03-17

## 2020-03-14 RX ADMIN — MONTELUKAST 10 MG: 10 TABLET, FILM COATED ORAL at 21:06

## 2020-03-14 RX ADMIN — ASPIRIN 81 MG: 81 TABLET, COATED ORAL at 05:39

## 2020-03-14 RX ADMIN — OXYCODONE HYDROCHLORIDE AND ACETAMINOPHEN 1 TABLET: 5; 325 TABLET ORAL at 11:11

## 2020-03-14 RX ADMIN — BUDESONIDE AND FORMOTEROL FUMARATE DIHYDRATE 2 PUFF: 160; 4.5 AEROSOL RESPIRATORY (INHALATION) at 20:12

## 2020-03-14 RX ADMIN — IPRATROPIUM BROMIDE AND ALBUTEROL SULFATE 3 ML: .5; 3 SOLUTION RESPIRATORY (INHALATION) at 07:40

## 2020-03-14 RX ADMIN — LIDOCAINE 1 PATCH: 50 PATCH TOPICAL at 05:38

## 2020-03-14 RX ADMIN — LEVOTHYROXINE SODIUM 100 MCG: 100 TABLET ORAL at 05:40

## 2020-03-14 RX ADMIN — ALBUTEROL SULFATE 2.5 MG: 2.5 SOLUTION RESPIRATORY (INHALATION) at 07:42

## 2020-03-14 RX ADMIN — ALBUTEROL SULFATE 2.5 MG: 2.5 SOLUTION RESPIRATORY (INHALATION) at 20:12

## 2020-03-14 RX ADMIN — PREGABALIN 300 MG: 100 CAPSULE ORAL at 05:39

## 2020-03-14 RX ADMIN — FLUOXETINE HYDROCHLORIDE 40 MG: 20 CAPSULE ORAL at 05:40

## 2020-03-14 RX ADMIN — PREDNISONE 5 MG: 5 TABLET ORAL at 05:40

## 2020-03-14 RX ADMIN — ACETAZOLAMIDE 1000 MG: 500 CAPSULE, EXTENDED RELEASE ORAL at 17:57

## 2020-03-14 RX ADMIN — PREGABALIN 300 MG: 100 CAPSULE ORAL at 17:57

## 2020-03-14 RX ADMIN — ACETAZOLAMIDE 1000 MG: 500 CAPSULE, EXTENDED RELEASE ORAL at 05:40

## 2020-03-14 RX ADMIN — BUDESONIDE AND FORMOTEROL FUMARATE DIHYDRATE 2 PUFF: 160; 4.5 AEROSOL RESPIRATORY (INHALATION) at 05:38

## 2020-03-14 RX ADMIN — SENNOSIDES AND DOCUSATE SODIUM 2 TABLET: 8.6; 5 TABLET ORAL at 05:40

## 2020-03-14 RX ADMIN — MYCOPHENOLATE MOFETIL 1000 MG: 250 CAPSULE ORAL at 05:45

## 2020-03-14 RX ADMIN — ALBUTEROL SULFATE 2.5 MG: 2.5 SOLUTION RESPIRATORY (INHALATION) at 15:11

## 2020-03-14 RX ADMIN — ENOXAPARIN SODIUM 40 MG: 100 INJECTION SUBCUTANEOUS at 05:42

## 2020-03-14 RX ADMIN — TRAZODONE HYDROCHLORIDE 50 MG: 100 TABLET ORAL at 17:57

## 2020-03-14 RX ADMIN — GLYCOPYRROLATE 1 CAPSULE: 15.6 CAPSULE RESPIRATORY (INHALATION) at 07:42

## 2020-03-14 RX ADMIN — BUSPIRONE HYDROCHLORIDE 10 MG: 10 TABLET ORAL at 05:40

## 2020-03-14 RX ADMIN — IBUPROFEN 400 MG: 800 TABLET, FILM COATED ORAL at 07:07

## 2020-03-14 RX ADMIN — BUSPIRONE HYDROCHLORIDE 10 MG: 10 TABLET ORAL at 17:57

## 2020-03-14 RX ADMIN — OXYCODONE HYDROCHLORIDE AND ACETAMINOPHEN 1 TABLET: 5; 325 TABLET ORAL at 00:19

## 2020-03-14 RX ADMIN — LIDOCAINE 1 PATCH: 50 PATCH TOPICAL at 13:15

## 2020-03-14 RX ADMIN — ZIPRASIDONE HYDROCHLORIDE 40 MG: 40 CAPSULE ORAL at 17:57

## 2020-03-14 RX ADMIN — LIDOCAINE HYDROCHLORIDE 15 ML: 20 SOLUTION ORAL; TOPICAL at 21:06

## 2020-03-14 RX ADMIN — IBUPROFEN 400 MG: 800 TABLET, FILM COATED ORAL at 00:19

## 2020-03-14 RX ADMIN — ZIPRASIDONE HYDROCHLORIDE 40 MG: 40 CAPSULE ORAL at 05:38

## 2020-03-14 RX ADMIN — MYCOPHENOLATE MOFETIL 1000 MG: 250 CAPSULE ORAL at 18:05

## 2020-03-14 RX ADMIN — OXCARBAZEPINE 150 MG: 150 TABLET, FILM COATED ORAL at 05:38

## 2020-03-14 RX ADMIN — OXYCODONE HYDROCHLORIDE AND ACETAMINOPHEN 1 TABLET: 5; 325 TABLET ORAL at 05:41

## 2020-03-14 RX ADMIN — OXCARBAZEPINE 150 MG: 150 TABLET, FILM COATED ORAL at 17:57

## 2020-03-14 ASSESSMENT — FIBROSIS 4 INDEX: FIB4 SCORE: 0.58

## 2020-03-14 NOTE — CARE PLAN
Problem: Pain Management  Goal: Pain level will decrease to patient's comfort goal  Outcome: PROGRESSING SLOWER THAN EXPECTED   Pt resting since care assumed.  Has requested pain meds x 1.  States feels better, will continue to monitor

## 2020-03-14 NOTE — WOUND TEAM
[facility]  Inpatient Services  Initial Wound and Skin Care Evaluation    Admission Date: 2020     Last order of IP CONSULT TO WOUND CARE was found on 3/14/2020 from Hospital Encounter on 2020       HPI, PMH, SH: Reviewed    Unit where seen by Wound Team: [id number]     WOUND CONSULT RELATED TO:  Blisters R foot    Self Report / Pain Level:  C/o pain with palpation, bilateral feet.    OBJECTIVE:  Pt lying in bed sleeping.    WOUND TYPE, LOCATION, CHARACTERISTICS (Pressure Injuries: location, stage, POA or date identified)  Wound 20 Partial Thickness Wound Foot Dorsal Right 2 small intact blisters (Active)   Wound Image   3/14/2020     Site Assessment Pink;Intact    Periwound Assessment Clean;Dry;Intact    Margins Attached edges;Defined edges    Closure None    Drainage Amount None    Treatments Site care    Periwound Protectant Skin Protectant Wipes to Periwound    Dressing Cleansing/Solutions Not Applicable    Dressing Options Mepilex    Dressing Change/Treatment Frequency Every 72 hrs, and As Needed    NEXT Dressing Change/Treatment Date 20    NEXT Weekly Photo (Inpatient Only) 20    Non-staged Wound Description Partial thickness    Wound Length (cm) 1 cm    Wound Width (cm) 1 cm    Wound Depth (cm) 0 cm    Wound Surface Area (cm^2) 1 cm^2    Wound Volume (cm^3) 0 cm^3    Shape Oval    Wound Odor None    Pulses 1+;DP    Exposed Structures None    Number of days: 0      Vascular:    ENRIKE:   ENRIKE Results, Last 30 Days Us-enrike Single Level Bilat    Result Date: 2020  Narrative  Vascular Laboratory  Conclusions  1) Normal Right ENRIKE 1.30 Left ENRIKE 1.23  HARELY DE LA CRUZ  Age:    29    Gender:     F  MRN:    [id number]  :    1990      BSA:  Exam Date:     2020 06:16  Room #:     Inpatient  Priority:     Stat  Ht (in):             Wt (lb):  Ordering Physician:        CARLI YANG  Referring Physician:  Sonographer:               Lobo Gama RDMS  Study Type:                 Complete Bilateral  Technical Quality:         Adequate  Indications:     Absent Pulse  CPT Codes:       80524  ICD Codes:       785.9  History:         No Previous JOSHUA, Diminished and or absent pulses  Limitations:                 RIGHT  Waveform            Systolic BPs (mmHg)                             116           Brachial  Triphasic                                Common Femoral  Triphasic                  151           Posterior Tibial  Triphasic                  121           Dorsalis Pedis                                           Peroneal                                           JOSHUA                                           TBI                       LEFT  Waveform        Systolic BPs (mmHg)                                           Brachial  Triphasic                                Common Femoral  Triphasic                  143           Posterior Tibial  Triphasic                  125           Dorsalis Pedis                                           Peroneal                                           JOSHUA                                           TBI  Findings  Bilateral.  Doppler waveform of the common femoral artery is of high amplitude and  triphasic.  Ankle-brachial index is normal.  There is no evidence of major arterial disease demonstrated bilaterally.  Nguyen Eller MD  (Electronically Signed)  Final Date:      27 February 2020                   07:35    Lab Values:    Lab Results   Component Value Date/Time    WBC 5.5 03/13/2020 03:39 AM    WBC 6.1 07/20/2010 11:00 AM    RBC 3.93 (L) 03/13/2020 03:39 AM    RBC 4.38 07/20/2010 11:00 AM    HEMOGLOBIN 11.9 (L) 03/13/2020 03:39 AM    HEMATOCRIT 36.1 (L) 03/13/2020 03:39 AM    CREACTPROT 0.40 02/27/2020 01:10 AM    SEDRATEWES 3 02/27/2020 02:43 AM    HBA1C 6.0 (H) 08/29/2019 02:27 PM        Culture: No clinical indicators   Culture Results show:  No results found for this or any previous visit (from the past 720 hour(s)).      INTERVENTIONS BY  WOUND TEAM:  Pt requested to sleep, blisters to R foot photographed. L foot intact. Educated ppt on not wearing Prafo boots 100% of time. Notified RN pt will need mepilex to bilateral dorsal feet to protect against further friction from the strap rubbing her foot.     Interdisciplinary consultation: Patient, Bedside RN     EVALUATION: Intact partial thickness blisters, mepilex will reduce friction / sheer.    Goals: Steady decrease in wound area and depth weekly.    NURSING PLAN OF CARE ORDERS (X):    Dressing changes: See Dressing Care orders: X  Skin care: See Skin Care orders:    Rectal tube care: See Rectal Tube Care orders:   Other orders:    RSKIN:   CURRENTLY IN PLACE (X), APPLIED THIS VISIT (A), ORDERED (O):   Q shift Shay:    Q shift pressure point assessments:    Pressure redistribution mattress  X          Low Airloss          Bariatric SOCO         Bariatric foam           Heel float boots     Heel Silicone dressing        Float Heels off Bed with Pillows               Barrier wipes         Barrier Cream         Barrier paste          Sacral silicone dressing     O    Silicone O2 tubing         Anchorfast         Cannula fixation Device (Tender )          Gray Foam Ear protectors           Trach with Optifoam split foam                 Waffle cushion        Waffle Overlay       O  Rectal tube or BMS    Purwick/Condom Cath          Antifungal tx      Interdry          Reposition q 2 hours      O  Up to chair        Ambulate      PT/OT        Dietician        Diabetes Education      PO     TF     TPN     NPO   # days   Other        WOUND TEAM PLAN OF CARE   Dressing changes by wound team:          Follow up 1-2 times weekly:      X         Follow up 3 times weekly:                NPWT change 3 times weekly:     Follow up as needed:       Other (explain):     Anticipated discharge plans:  LTACH:        SNF/Rehab:                  Home Care:    X       Outpatient Wound Center:            Self Care:

## 2020-03-14 NOTE — PROGRESS NOTES
Daily Progress Note:     Date of Service: 3/14/2020  Primary Team: [facility] White Team   Attending: Sonal Witt M.D.   Senior Resident: Dr. Horton  Intern: Dr. Goodson  Contact:  [phone]    Chief Complaint:   Bilateral lower extremity weakness    Subjective    Patient reports she continues to have pain in her tongue. Discussed with patient will review her medications. Patient to rinse her mouth after respiratory/ inhaler treatments. Patient also noted minor superficial abrasion, on dorsal right feet caused by her orthotics.    - will discontinue seebri ( tiotropium) due to correlation with onset symptoms of tongue pain  - will only get weekly labs    Consultants/Specialty:  Neurology  Psychiatry  Review of Systems:     Constitutional: Negative for chills and fever.   Eyes:        Chronic visual changes   Respiratory: Positive for shortness of breath. Negative for cough and hemoptysis.         Report short of breath and feeling like muscle is tired    Cardiovascular: Negative for chest pain, palpitations and leg swelling.   Gastrointestinal: Negative for abdominal pain, nausea and vomiting.   Genitourinary:        Reports warm/burning sensation during urination   Musculoskeletal: Positive for joint pain.   Neurological: Positive for headaches.        Chronic wooshing, chronic bilateral lower extremity weakness    Objective Data:   Physical Exam:   Vitals:   Temp:  [36.1 °C (97 °F)-36.6 °C (97.8 °F)] 36.3 °C (97.4 °F)  Pulse:  [71-87] 80  Resp:  [15-18] 18  BP: ()/(37-74) 106/47  SpO2:  [93 %-98 %] 96 %     Constitutional:       General: She is not in acute distress.     Appearance: Normal appearance. She is obese.   HENT:      Head: Normocephalic and atraumatic. Mouth, erythema in tongue and roof of mouth without white plaque  Eyes:      Extraocular Movements: Extraocular movements intact.      Pupils: Pupils are equal, round, and reactive to light.   Neck:      Musculoskeletal: Normal range of  motion and neck supple.   Cardiovascular:      Rate and Rhythm: Normal rate and regular rhythm.      Heart sounds: Normal heart sounds.   Pulmonary:      Effort: Pulmonary effort is normal. No respiratory distress.      Breath sounds: Normal breath sounds. No expiratory wheezing.   Abdominal:      General: Bowel sounds are normal. There is no distension.      Palpations: Abdomen is soft.   Musculoskeletal:         General: mild tenderness present. No swelling or deformity.      Comments: ROM of right knee improved and right elbow improved, left knee improved.  Chronic weakness in bilateral lower extremity   Skin:     General: Skin is warm and dry.      Comments: Slight bruising distal to right knee, anteriorly. No signs of active inflammation , no increase in warm, no significant change from previous admission, superficial abrasion dime size over right dorsal foot, dry, no erythema, clean.   Neurological:      Mental Status: She is alert and oriented to person, place, and time. Mental status is at baseline.      Labs:   Recent Labs     03/12/20 0639 03/13/20  0339   WBC 4.8 5.5   RBC 3.93* 3.93*   HEMOGLOBIN 12.1 11.9*   HEMATOCRIT 35.7* 36.1*   MCV 90.8 91.9   MCH 30.8 30.3   RDW 46.5 47.9   PLATELETCT 130* 160*   MPV 11.7 12.1   NEUTSPOLYS  --  54.50   LYMPHOCYTES  --  37.60   MONOCYTES  --  5.60   EOSINOPHILS  --  1.50   BASOPHILS  --  0.40     Recent Labs     03/12/20 0639 03/13/20  0339   SODIUM 141 139   POTASSIUM 3.3* 3.2*   CHLORIDE 111 115*   CO2 15* 14*   GLUCOSE 102* 95   BUN 14 13       Imaging:   No new imaging    * Leg weakness, bilateral  Assessment & Plan  -Patient caregiver states she is unable to provide safe transfers between chair and bed  -PT states there is no more equipment they can provide to assist with it  -It is likely that patient will require long term care facility placement  -CT spine ruled out any acute pathology   -Patient unable to undergo MRI because of Stimulator in place.      Neurology consult, appreciate recommendations  Neuropthalmology recommendation: continue med :   1. Diamox, 1000 mg p.o. b.i.d.  2. Maintain the prednisone 5 mg p.o. daily.  3. Maintain the CellCept 1000 mg p.o. b.i.d.  4. Follow up in neurophthalmology clinic at discharge  Psychiatric consult, continue psychiatric medication  Q4 neuro checks.   Pain control for LBP, right knee pain  Pending placement      Glossitis  Assessment & Plan  - Erythema on tongue and roof of mouth on examination without white plaque  - Glossitis, unknown etiology, ddx iatrogenic, nutitional  - albuterol induced glossitis vs symbicor induced thrush vs seebri induced glossitis  - B12 300s 02/2020, folate WNL ( 2019) , ferritin 100s (2018)  Plan  - Conservative treatment with care  - Encourage oral hydration, rinse mouth after symbicort use  - discontinue seebri      Intracranial pressure increased  Assessment & Plan  -Clinically stable.   -Headache with whooshing sounds  -Patient was previously on Lasix started at dose of 20mg in 2017 for bilateral lower extremity edema, and has been on lasix for several years. Her lasix 80mg daily was discontinued upon discharge from hospital 02/25/2020.   -Patient was started on Diamox / filled diamox 02/27/20 outpatient , dose increased while patient was inpatient during this hospitalization to 1000mg BID per neurology    Plan :  Resumed 1000mg Diamox         TONYA (obstructive sleep apnea)- (present on admission)  Assessment & Plan  Not on CPAP at home. Patient has not followed up with doctor yet for the same.   Follow up outpatient.     UTI (urinary tract infection)  Assessment & Plan  History of dysuria,  UA: Cloudy urine, nitrite positive, leukocyte esterase small, WBC 20-50, bacteria many  Urine culture: Lactose fermenting Gram negative zita   >100,000 cfu/mL -Klebsiella pneumoniae sensitive to bactrim.  Got Bactrim for 3 days  - UA 03/11/20 with trace LE, negative nitrite and mod epithelial cell,  "low WBC, likely skin letty contaminate. Will wait for rflx cx    DM (diabetes mellitus) (HCC)- (present on admission)  Assessment & Plan  A1C 6.0 (08/2019)  Continue home meds.   Dulaglutide injection every Friday .   Diabetic diet.    Joint pain  Assessment & Plan  Significantly improved.   Patient report slipped off bed while in sitting position and landed on her knees and right elbow  She has significant pain on right knee upon palpation or movement  Given mechanism of injury will consider conservative management with rest, ice and lidocaine patch for right knee  Xray of R knee without bony abnormality or joint effusion  PLAN  Continue Ibuprofen 400mg Q6H Prn ordered for 3 days for acute episode of pain in right knee  Continue lidocaine patch and conservative management    Asthma- (present on admission)  Assessment & Plan  Continue home meds  Start montelukast and glycopyrronium bromide as per RT rec  Reported \"feeling like drowning\" improved after RT and bed positioning.   Saturating well on room air.     Hypothyroidism- (present on admission)  Assessment & Plan  TSH 5.65  Increased Levothyroxine to 100mcg on 3/3  Continue levothyroxine, Patient will need to followed up outpatient in 1-2 month (05/2020)        "

## 2020-03-14 NOTE — PROGRESS NOTES
Bedside report recvd.  Pt resting.  Pleasant, denies any needs at this time.  Will continue to monitor

## 2020-03-15 LAB
ALBUMIN SERPL BCP-MCNC: 4.1 G/DL (ref 3.2–4.9)
ALBUMIN/GLOB SERPL: 2.3 G/DL
ALP SERPL-CCNC: 47 U/L (ref 30–99)
ALT SERPL-CCNC: 15 U/L (ref 2–50)
ANION GAP SERPL CALC-SCNC: 10 MMOL/L (ref 7–16)
AST SERPL-CCNC: 7 U/L (ref 12–45)
BILIRUB SERPL-MCNC: 0.4 MG/DL (ref 0.1–1.5)
BUN SERPL-MCNC: 10 MG/DL (ref 8–22)
CALCIUM SERPL-MCNC: 9.3 MG/DL (ref 8.5–10.5)
CHLORIDE SERPL-SCNC: 116 MMOL/L (ref 96–112)
CO2 SERPL-SCNC: 14 MMOL/L (ref 20–33)
CREAT SERPL-MCNC: 0.79 MG/DL (ref 0.5–1.4)
EKG IMPRESSION: NORMAL
ERYTHROCYTE [DISTWIDTH] IN BLOOD BY AUTOMATED COUNT: 48.5 FL (ref 35.9–50)
GLOBULIN SER CALC-MCNC: 1.8 G/DL (ref 1.9–3.5)
GLUCOSE BLD-MCNC: 89 MG/DL (ref 65–99)
GLUCOSE SERPL-MCNC: 101 MG/DL (ref 65–99)
HCT VFR BLD AUTO: 37.1 % (ref 37–47)
HGB BLD-MCNC: 11.9 G/DL (ref 12–16)
MCH RBC QN AUTO: 29.8 PG (ref 27–33)
MCHC RBC AUTO-ENTMCNC: 32.1 G/DL (ref 33.6–35)
MCV RBC AUTO: 92.8 FL (ref 81.4–97.8)
PLATELET # BLD AUTO: 144 K/UL (ref 164–446)
PMV BLD AUTO: 11.2 FL (ref 9–12.9)
POTASSIUM SERPL-SCNC: 3.6 MMOL/L (ref 3.6–5.5)
PROT SERPL-MCNC: 5.9 G/DL (ref 6–8.2)
RBC # BLD AUTO: 4 M/UL (ref 4.2–5.4)
SODIUM SERPL-SCNC: 140 MMOL/L (ref 135–145)
TROPONIN T SERPL-MCNC: <6 NG/L (ref 6–19)
WBC # BLD AUTO: 5.4 K/UL (ref 4.8–10.8)

## 2020-03-15 PROCEDURE — 700102 HCHG RX REV CODE 250 W/ 637 OVERRIDE(OP): Performed by: STUDENT IN AN ORGANIZED HEALTH CARE EDUCATION/TRAINING PROGRAM

## 2020-03-15 PROCEDURE — 85027 COMPLETE CBC AUTOMATED: CPT

## 2020-03-15 PROCEDURE — G0378 HOSPITAL OBSERVATION PER HR: HCPCS

## 2020-03-15 PROCEDURE — 84484 ASSAY OF TROPONIN QUANT: CPT

## 2020-03-15 PROCEDURE — A9270 NON-COVERED ITEM OR SERVICE: HCPCS | Performed by: STUDENT IN AN ORGANIZED HEALTH CARE EDUCATION/TRAINING PROGRAM

## 2020-03-15 PROCEDURE — 700101 HCHG RX REV CODE 250: Performed by: INTERNAL MEDICINE

## 2020-03-15 PROCEDURE — 700102 HCHG RX REV CODE 250 W/ 637 OVERRIDE(OP): Performed by: PSYCHIATRY & NEUROLOGY

## 2020-03-15 PROCEDURE — 36415 COLL VENOUS BLD VENIPUNCTURE: CPT

## 2020-03-15 PROCEDURE — 700102 HCHG RX REV CODE 250 W/ 637 OVERRIDE(OP): Performed by: INTERNAL MEDICINE

## 2020-03-15 PROCEDURE — 700111 HCHG RX REV CODE 636 W/ 250 OVERRIDE (IP): Performed by: STUDENT IN AN ORGANIZED HEALTH CARE EDUCATION/TRAINING PROGRAM

## 2020-03-15 PROCEDURE — 700111 HCHG RX REV CODE 636 W/ 250 OVERRIDE (IP): Performed by: INTERNAL MEDICINE

## 2020-03-15 PROCEDURE — 94640 AIRWAY INHALATION TREATMENT: CPT

## 2020-03-15 PROCEDURE — 99225 PR SUBSEQUENT OBSERVATION CARE,LEVEL II: CPT | Mod: GC | Performed by: INTERNAL MEDICINE

## 2020-03-15 PROCEDURE — 700101 HCHG RX REV CODE 250: Performed by: STUDENT IN AN ORGANIZED HEALTH CARE EDUCATION/TRAINING PROGRAM

## 2020-03-15 PROCEDURE — 94760 N-INVAS EAR/PLS OXIMETRY 1: CPT

## 2020-03-15 PROCEDURE — A9270 NON-COVERED ITEM OR SERVICE: HCPCS | Performed by: PSYCHIATRY & NEUROLOGY

## 2020-03-15 PROCEDURE — 93010 ELECTROCARDIOGRAM REPORT: CPT | Performed by: INTERNAL MEDICINE

## 2020-03-15 PROCEDURE — A9270 NON-COVERED ITEM OR SERVICE: HCPCS | Performed by: INTERNAL MEDICINE

## 2020-03-15 PROCEDURE — 96372 THER/PROPH/DIAG INJ SC/IM: CPT

## 2020-03-15 PROCEDURE — 82962 GLUCOSE BLOOD TEST: CPT

## 2020-03-15 PROCEDURE — 80053 COMPREHEN METABOLIC PANEL: CPT

## 2020-03-15 PROCEDURE — 93005 ELECTROCARDIOGRAM TRACING: CPT | Performed by: STUDENT IN AN ORGANIZED HEALTH CARE EDUCATION/TRAINING PROGRAM

## 2020-03-15 RX ORDER — CLOTRIMAZOLE 1 %
CREAM (GRAM) TOPICAL 2 TIMES DAILY
Status: DISCONTINUED | OUTPATIENT
Start: 2020-03-15 | End: 2020-03-24 | Stop reason: HOSPADM

## 2020-03-15 RX ORDER — NITROGLYCERIN 0.4 MG/1
TABLET SUBLINGUAL
Status: ACTIVE
Start: 2020-03-15 | End: 2020-03-16

## 2020-03-15 RX ORDER — PREGABALIN 100 MG/1
CAPSULE ORAL
Status: COMPLETED
Start: 2020-03-15 | End: 2020-03-15

## 2020-03-15 RX ORDER — OXYCODONE HYDROCHLORIDE 10 MG/1
10 TABLET ORAL ONCE
Status: COMPLETED | OUTPATIENT
Start: 2020-03-15 | End: 2020-03-15

## 2020-03-15 RX ADMIN — OXCARBAZEPINE 150 MG: 150 TABLET, FILM COATED ORAL at 16:33

## 2020-03-15 RX ADMIN — LIDOCAINE 1 PATCH: 50 PATCH TOPICAL at 05:36

## 2020-03-15 RX ADMIN — CLOTRIMAZOLE: 10 CREAM TOPICAL at 16:31

## 2020-03-15 RX ADMIN — ALBUTEROL SULFATE 2.5 MG: 2.5 SOLUTION RESPIRATORY (INHALATION) at 14:38

## 2020-03-15 RX ADMIN — TRAZODONE HYDROCHLORIDE 50 MG: 100 TABLET ORAL at 16:30

## 2020-03-15 RX ADMIN — OXYCODONE HYDROCHLORIDE AND ACETAMINOPHEN 1 TABLET: 5; 325 TABLET ORAL at 16:30

## 2020-03-15 RX ADMIN — PREDNISONE 5 MG: 5 TABLET ORAL at 05:39

## 2020-03-15 RX ADMIN — BUSPIRONE HYDROCHLORIDE 10 MG: 10 TABLET ORAL at 16:30

## 2020-03-15 RX ADMIN — BUSPIRONE HYDROCHLORIDE 10 MG: 10 TABLET ORAL at 05:39

## 2020-03-15 RX ADMIN — LIDOCAINE HYDROCHLORIDE 15 ML: 20 SOLUTION OROPHARYNGEAL at 18:04

## 2020-03-15 RX ADMIN — ACETAZOLAMIDE 1000 MG: 500 CAPSULE, EXTENDED RELEASE ORAL at 16:30

## 2020-03-15 RX ADMIN — MYCOPHENOLATE MOFETIL 1000 MG: 250 CAPSULE ORAL at 18:04

## 2020-03-15 RX ADMIN — ACETAZOLAMIDE 1000 MG: 500 CAPSULE, EXTENDED RELEASE ORAL at 05:37

## 2020-03-15 RX ADMIN — OXYCODONE HYDROCHLORIDE AND ACETAMINOPHEN 1 TABLET: 5; 325 TABLET ORAL at 06:36

## 2020-03-15 RX ADMIN — BUDESONIDE AND FORMOTEROL FUMARATE DIHYDRATE 2 PUFF: 160; 4.5 AEROSOL RESPIRATORY (INHALATION) at 05:36

## 2020-03-15 RX ADMIN — ALBUTEROL SULFATE 2.5 MG: 2.5 SOLUTION RESPIRATORY (INHALATION) at 07:18

## 2020-03-15 RX ADMIN — PREGABALIN 300 MG: 100 CAPSULE ORAL at 16:31

## 2020-03-15 RX ADMIN — ENOXAPARIN SODIUM 40 MG: 100 INJECTION SUBCUTANEOUS at 05:37

## 2020-03-15 RX ADMIN — MONTELUKAST 10 MG: 10 TABLET, FILM COATED ORAL at 21:00

## 2020-03-15 RX ADMIN — ALBUTEROL SULFATE 2.5 MG: 2.5 SOLUTION RESPIRATORY (INHALATION) at 10:28

## 2020-03-15 RX ADMIN — FLUOXETINE HYDROCHLORIDE 40 MG: 20 CAPSULE ORAL at 05:37

## 2020-03-15 RX ADMIN — OXYCODONE HYDROCHLORIDE 10 MG: 10 TABLET ORAL at 18:03

## 2020-03-15 RX ADMIN — OXCARBAZEPINE 150 MG: 150 TABLET, FILM COATED ORAL at 05:39

## 2020-03-15 RX ADMIN — PREGABALIN 300 MG: 100 CAPSULE ORAL at 05:39

## 2020-03-15 RX ADMIN — MYCOPHENOLATE MOFETIL 1000 MG: 250 CAPSULE ORAL at 07:52

## 2020-03-15 RX ADMIN — LEVOTHYROXINE SODIUM 100 MCG: 100 TABLET ORAL at 05:43

## 2020-03-15 RX ADMIN — IBUPROFEN 400 MG: 800 TABLET, FILM COATED ORAL at 12:33

## 2020-03-15 RX ADMIN — ZIPRASIDONE HYDROCHLORIDE 40 MG: 40 CAPSULE ORAL at 05:37

## 2020-03-15 RX ADMIN — LIDOCAINE 1 PATCH: 50 PATCH TOPICAL at 12:34

## 2020-03-15 RX ADMIN — ASPIRIN 81 MG: 81 TABLET, COATED ORAL at 05:39

## 2020-03-15 RX ADMIN — ALBUTEROL SULFATE 2.5 MG: 2.5 SOLUTION RESPIRATORY (INHALATION) at 19:35

## 2020-03-15 RX ADMIN — OXYCODONE HYDROCHLORIDE AND ACETAMINOPHEN 1 TABLET: 5; 325 TABLET ORAL at 12:34

## 2020-03-15 RX ADMIN — LIDOCAINE HYDROCHLORIDE 5 ML: 20 SOLUTION OROPHARYNGEAL at 16:32

## 2020-03-15 RX ADMIN — BUDESONIDE AND FORMOTEROL FUMARATE DIHYDRATE 2 PUFF: 160; 4.5 AEROSOL RESPIRATORY (INHALATION) at 16:32

## 2020-03-15 RX ADMIN — ZIPRASIDONE HYDROCHLORIDE 40 MG: 40 CAPSULE ORAL at 16:33

## 2020-03-15 NOTE — CARE PLAN
Patient educated on safety. Patient bed locked and in low position. Valuables within reach. Hourly rounding in place. Call light within reach

## 2020-03-15 NOTE — PROGRESS NOTES
2 RN skin check:   Pt. Has bilateral heel float boots on 4 hours off 4 hours. They were removed at 1100.     Blisters on dorsal surface of R foot under boot, mepalex In place.     Heels floated on pillow.

## 2020-03-15 NOTE — CARE PLAN
Problem: Safety  Goal: Will remain free from injury  Outcome: PROGRESSING AS EXPECTED  Goal: Will remain free from falls  Outcome: PROGRESSING AS EXPECTED  Note: Fall precautions and bed alarm in place      Problem: Venous Thromboembolism (VTW)/Deep Vein Thrombosis (DVT) Prevention:  Goal: Patient will participate in Venous Thrombosis (VTE)/Deep Vein Thrombosis (DVT)Prevention Measures  Outcome: PROGRESSING AS EXPECTED  Flowsheets (Taken 3/14/2020 1930)  Risk Assessment Score: 2  VTE RISK: Moderate  Mechanical Prophylaxis: SCDs, Sequential Compression Device  SCDs, Sequential Compression Device: Refused  Pharmacologic Prophylaxis Used: LMWH: Enoxaparin(Lovenox)

## 2020-03-15 NOTE — PROGRESS NOTES
Bedside report received. Patient cleaned up of incontinence episode. Heal boots placed on for the next 4 hours. No SOB. Denies pain.

## 2020-03-15 NOTE — PROGRESS NOTES
Received handoff report from day shift RN. The pt. is A&O x4. Pt. Has no c/o pain and states no needs at this time.

## 2020-03-15 NOTE — PROGRESS NOTES
Daily Progress Note:     Date of Service: 3/15/2020  Primary Team: [facility] White Team   Attending: Sonal Witt M.D.   Senior Resident: Dr. Horton  Intern: Dr. Goodson  Contact:  [phone]    Chief Complaint:   Bilateral lower extremity weakness    Subjective:   No events overnight, patient is still complaining of oral pain, did not do well with the viscous Xylocaine because of consistency, switched to BMX, also complaining of groin irritation and burning pain, physical exam with her nurse consistent with fungal infection, started her on clotrimazole cream.    Consultants/Specialty:  Neurology  Psychiatry  Review of Systems:     Constitutional: Negative for chills and fever.   Eyes:        Chronic visual changes   Respiratory: Positive for shortness of breath. Negative for cough and hemoptysis.         Report short of breath and feeling like muscle is tired    Cardiovascular: Negative for chest pain, palpitations and leg swelling.   Gastrointestinal: Negative for abdominal pain, nausea and vomiting.   Genitourinary:        Reports burning sensation   Musculoskeletal: Positive for joint pain.   Neurological: Positive for headaches.        Chronic wooshing, chronic bilateral lower extremity weakness    Objective Data:   Physical Exam:   Vitals:   Temp:  [36.1 °C (97 °F)-36.7 °C (98 °F)] 36.1 °C (97 °F)  Pulse:  [72-98] 98  Resp:  [16-20] 18  BP: (94-95)/(41-50) 95/41  SpO2:  [95 %-98 %] 96 %     Constitutional:       General: She is not in acute distress.     Appearance: Normal appearance. She is obese.   HENT:      Head: Normocephalic and atraumatic. Mouth, erythema in tongue and roof of mouth without white plaque  Eyes:      Extraocular Movements: Extraocular movements intact.      Pupils: Pupils are equal, round, and reactive to light.   Neck:      Musculoskeletal: Normal range of motion and neck supple.   Cardiovascular:      Rate and Rhythm: Normal rate and regular rhythm.      Heart sounds: Normal heart  sounds.   Pulmonary:      Effort: Pulmonary effort is normal. No respiratory distress.      Breath sounds: Normal breath sounds. No expiratory wheezing.   Abdominal:      General: Bowel sounds are normal. There is no distension.      Palpations: Abdomen is soft.   Musculoskeletal:         General: mild tenderness present. No swelling or deformity.     Genital exam: Done today with her nurse as chaperone, no ulcers, skin irritation and redness mostly in the skin folds, she is morbidly obese.  Skin:     General: Skin is warm and dry.     Neurological:      Mental Status: She is alert and oriented to person, place, and time. Mental status is at baseline.      Labs:   Recent Labs     03/13/20  0339   WBC 5.5   RBC 3.93*   HEMOGLOBIN 11.9*   HEMATOCRIT 36.1*   MCV 91.9   MCH 30.3   RDW 47.9   PLATELETCT 160*   MPV 12.1   NEUTSPOLYS 54.50   LYMPHOCYTES 37.60   MONOCYTES 5.60   EOSINOPHILS 1.50   BASOPHILS 0.40     Recent Labs     03/13/20  0339   SODIUM 139   POTASSIUM 3.2*   CHLORIDE 115*   CO2 14*   GLUCOSE 95   BUN 13       Imaging:   No new imaging    * Leg weakness, bilateral  Assessment & Plan  -Patient caregiver states she is unable to provide safe transfers between chair and bed  -PT states there is no more equipment they can provide to assist with it  -It is likely that patient will require long term care facility placement  -CT spine ruled out any acute pathology   -Patient unable to undergo MRI because of Stimulator in place.     Neurology consult, appreciate recommendations  Neuropthalmology recommendation: continue med :   1. Diamox, 1000 mg p.o. b.i.d.  2. Maintain the prednisone 5 mg p.o. daily.  3. Maintain the CellCept 1000 mg p.o. b.i.d.  4. Follow up in neurophthalmology clinic at discharge  Psychiatric consult, continue psychiatric medication  Q4 neuro checks.   Pain control for LBP, right knee pain  Pending placement      Glossitis  Assessment & Plan  - Erythema on tongue and roof of mouth on  examination without white plaque  - Glossitis, unknown etiology, ddx iatrogenic, nutitional  - albuterol induced glossitis vs symbicor induced thrush vs seebri induced glossitis  - B12 300s 02/2020, folate WNL ( 2019) , ferritin 100s (2018)  Plan  - BMX for pain.    - Encourage oral hydration, rinse mouth after symbicort use  - discontinue seebri  -Discussed with pharmacy, she is on multiple medications that can affect her immunity for can cause rash, will keep monitoring.    Intracranial pressure increased  Assessment & Plan  -Clinically stable.   -Headache with whooshing sounds  -Patient was previously on Lasix started at dose of 20mg in 2017 for bilateral lower extremity edema, and has been on lasix for several years. Her lasix 80mg daily was discontinued upon discharge from hospital 02/25/2020.   -Patient was started on Diamox / filled diamox 02/27/20 outpatient , dose increased while patient was inpatient during this hospitalization to 1000mg BID per neurology    Plan :  Resumed 1000mg Diamox         TONYA (obstructive sleep apnea)- (present on admission)  Assessment & Plan  Not on CPAP at home. Patient has not followed up with doctor yet for the same.   Follow up outpatient.     UTI (urinary tract infection)  Assessment & Plan  History of dysuria,  UA: Cloudy urine, nitrite positive, leukocyte esterase small, WBC 20-50, bacteria many  Urine culture: Lactose fermenting Gram negative zita   >100,000 cfu/mL -Klebsiella pneumoniae sensitive to bactrim.  Got Bactrim for 3 days  - UA 03/11/20 with trace LE, negative nitrite and mod epithelial cell, low WBC, likely skin letty contaminate. Will wait for rflx cx    DM (diabetes mellitus) ([facility])- (present on admission)  Assessment & Plan  A1C 6.0 (08/2019)  Continue home meds.   Dulaglutide injection every Friday .   Diabetic diet.    Joint pain  Assessment & Plan  Significantly improved.   Patient report slipped off bed while in sitting position and landed on her knees and  "right elbow  She has significant pain on right knee upon palpation or movement  Given mechanism of injury will consider conservative management with rest, ice and lidocaine patch for right knee  Xray of R knee without bony abnormality or joint effusion  PLAN  Continue Ibuprofen 400mg Q6H Prn ordered for 3 days for acute episode of pain in right knee  Continue lidocaine patch and conservative management    Asthma- (present on admission)  Assessment & Plan  Continue home meds  Start montelukast and glycopyrronium bromide as per RT rec  Reported \"feeling like drowning\" improved after RT and bed positioning.   Saturating well on room air.     Hypothyroidism- (present on admission)  Assessment & Plan  TSH 5.65  Increased Levothyroxine to 100mcg on 3/3  Continue levothyroxine, Patient will need to followed up outpatient in 1-2 month (05/2020)        "

## 2020-03-16 LAB
ALBUMIN SERPL BCP-MCNC: 4.2 G/DL (ref 3.2–4.9)
ALBUMIN/GLOB SERPL: 2.5 G/DL
ALP SERPL-CCNC: 46 U/L (ref 30–99)
ALT SERPL-CCNC: 15 U/L (ref 2–50)
ANION GAP SERPL CALC-SCNC: 12 MMOL/L (ref 7–16)
AST SERPL-CCNC: 7 U/L (ref 12–45)
BASOPHILS # BLD AUTO: 0.2 % (ref 0–1.8)
BASOPHILS # BLD: 0.01 K/UL (ref 0–0.12)
BILIRUB SERPL-MCNC: 0.3 MG/DL (ref 0.1–1.5)
BUN SERPL-MCNC: 9 MG/DL (ref 8–22)
CALCIUM SERPL-MCNC: 9.2 MG/DL (ref 8.5–10.5)
CHLORIDE SERPL-SCNC: 114 MMOL/L (ref 96–112)
CO2 SERPL-SCNC: 15 MMOL/L (ref 20–33)
CREAT SERPL-MCNC: 0.85 MG/DL (ref 0.5–1.4)
EKG IMPRESSION: NORMAL
EOSINOPHIL # BLD AUTO: 0.04 K/UL (ref 0–0.51)
EOSINOPHIL NFR BLD: 0.7 % (ref 0–6.9)
ERYTHROCYTE [DISTWIDTH] IN BLOOD BY AUTOMATED COUNT: 49.1 FL (ref 35.9–50)
GLOBULIN SER CALC-MCNC: 1.7 G/DL (ref 1.9–3.5)
GLUCOSE SERPL-MCNC: 97 MG/DL (ref 65–99)
HCT VFR BLD AUTO: 36.6 % (ref 37–47)
HGB BLD-MCNC: 11.9 G/DL (ref 12–16)
IMM GRANULOCYTES # BLD AUTO: 0.02 K/UL (ref 0–0.11)
IMM GRANULOCYTES NFR BLD AUTO: 0.4 % (ref 0–0.9)
LYMPHOCYTES # BLD AUTO: 0.81 K/UL (ref 1–4.8)
LYMPHOCYTES NFR BLD: 14.2 % (ref 22–41)
MCH RBC QN AUTO: 30.3 PG (ref 27–33)
MCHC RBC AUTO-ENTMCNC: 32.5 G/DL (ref 33.6–35)
MCV RBC AUTO: 93.1 FL (ref 81.4–97.8)
MONOCYTES # BLD AUTO: 0.29 K/UL (ref 0–0.85)
MONOCYTES NFR BLD AUTO: 5.1 % (ref 0–13.4)
NEUTROPHILS # BLD AUTO: 4.54 K/UL (ref 2–7.15)
NEUTROPHILS NFR BLD: 79.4 % (ref 44–72)
NRBC # BLD AUTO: 0 K/UL
NRBC BLD-RTO: 0 /100 WBC
PLATELET # BLD AUTO: 163 K/UL (ref 164–446)
PMV BLD AUTO: 11.3 FL (ref 9–12.9)
POTASSIUM SERPL-SCNC: 3.9 MMOL/L (ref 3.6–5.5)
PROT SERPL-MCNC: 5.9 G/DL (ref 6–8.2)
RBC # BLD AUTO: 3.93 M/UL (ref 4.2–5.4)
SODIUM SERPL-SCNC: 141 MMOL/L (ref 135–145)
TROPONIN T SERPL-MCNC: 6 NG/L (ref 6–19)
WBC # BLD AUTO: 5.7 K/UL (ref 4.8–10.8)

## 2020-03-16 PROCEDURE — 85025 COMPLETE CBC W/AUTO DIFF WBC: CPT

## 2020-03-16 PROCEDURE — 700102 HCHG RX REV CODE 250 W/ 637 OVERRIDE(OP): Performed by: STUDENT IN AN ORGANIZED HEALTH CARE EDUCATION/TRAINING PROGRAM

## 2020-03-16 PROCEDURE — 93010 ELECTROCARDIOGRAM REPORT: CPT | Performed by: INTERNAL MEDICINE

## 2020-03-16 PROCEDURE — 99224 PR SUBSEQUENT OBSERVATION CARE,LEVEL I: CPT | Mod: GC | Performed by: INTERNAL MEDICINE

## 2020-03-16 PROCEDURE — 84484 ASSAY OF TROPONIN QUANT: CPT

## 2020-03-16 PROCEDURE — 93005 ELECTROCARDIOGRAM TRACING: CPT | Performed by: STUDENT IN AN ORGANIZED HEALTH CARE EDUCATION/TRAINING PROGRAM

## 2020-03-16 PROCEDURE — A9270 NON-COVERED ITEM OR SERVICE: HCPCS | Performed by: INTERNAL MEDICINE

## 2020-03-16 PROCEDURE — 700102 HCHG RX REV CODE 250 W/ 637 OVERRIDE(OP): Performed by: INTERNAL MEDICINE

## 2020-03-16 PROCEDURE — 36415 COLL VENOUS BLD VENIPUNCTURE: CPT

## 2020-03-16 PROCEDURE — A9270 NON-COVERED ITEM OR SERVICE: HCPCS | Performed by: STUDENT IN AN ORGANIZED HEALTH CARE EDUCATION/TRAINING PROGRAM

## 2020-03-16 PROCEDURE — 94640 AIRWAY INHALATION TREATMENT: CPT

## 2020-03-16 PROCEDURE — 700111 HCHG RX REV CODE 636 W/ 250 OVERRIDE (IP): Performed by: STUDENT IN AN ORGANIZED HEALTH CARE EDUCATION/TRAINING PROGRAM

## 2020-03-16 PROCEDURE — 700111 HCHG RX REV CODE 636 W/ 250 OVERRIDE (IP): Performed by: INTERNAL MEDICINE

## 2020-03-16 PROCEDURE — 700102 HCHG RX REV CODE 250 W/ 637 OVERRIDE(OP): Performed by: PSYCHIATRY & NEUROLOGY

## 2020-03-16 PROCEDURE — 700101 HCHG RX REV CODE 250: Performed by: STUDENT IN AN ORGANIZED HEALTH CARE EDUCATION/TRAINING PROGRAM

## 2020-03-16 PROCEDURE — 96372 THER/PROPH/DIAG INJ SC/IM: CPT

## 2020-03-16 PROCEDURE — 700101 HCHG RX REV CODE 250: Performed by: INTERNAL MEDICINE

## 2020-03-16 PROCEDURE — G0378 HOSPITAL OBSERVATION PER HR: HCPCS

## 2020-03-16 PROCEDURE — A9270 NON-COVERED ITEM OR SERVICE: HCPCS | Performed by: PSYCHIATRY & NEUROLOGY

## 2020-03-16 PROCEDURE — 80053 COMPREHEN METABOLIC PANEL: CPT

## 2020-03-16 RX ADMIN — MYCOPHENOLATE MOFETIL 1000 MG: 250 CAPSULE ORAL at 05:13

## 2020-03-16 RX ADMIN — OXCARBAZEPINE 150 MG: 150 TABLET, FILM COATED ORAL at 05:13

## 2020-03-16 RX ADMIN — ACETAZOLAMIDE 1000 MG: 500 CAPSULE, EXTENDED RELEASE ORAL at 05:13

## 2020-03-16 RX ADMIN — ACETAMINOPHEN 650 MG: 325 TABLET, FILM COATED ORAL at 05:12

## 2020-03-16 RX ADMIN — CLOTRIMAZOLE: 10 CREAM TOPICAL at 17:05

## 2020-03-16 RX ADMIN — PREGABALIN 300 MG: 100 CAPSULE ORAL at 05:14

## 2020-03-16 RX ADMIN — FLUOXETINE HYDROCHLORIDE 40 MG: 20 CAPSULE ORAL at 05:13

## 2020-03-16 RX ADMIN — CLOTRIMAZOLE: 10 CREAM TOPICAL at 05:12

## 2020-03-16 RX ADMIN — OXYCODONE HYDROCHLORIDE AND ACETAMINOPHEN 1 TABLET: 5; 325 TABLET ORAL at 02:38

## 2020-03-16 RX ADMIN — ACETAZOLAMIDE 1000 MG: 500 CAPSULE, EXTENDED RELEASE ORAL at 17:04

## 2020-03-16 RX ADMIN — LIDOCAINE 1 PATCH: 50 PATCH TOPICAL at 12:45

## 2020-03-16 RX ADMIN — ONDANSETRON 4 MG: 4 TABLET, ORALLY DISINTEGRATING ORAL at 07:34

## 2020-03-16 RX ADMIN — LEVOTHYROXINE SODIUM 100 MCG: 100 TABLET ORAL at 05:14

## 2020-03-16 RX ADMIN — BUSPIRONE HYDROCHLORIDE 10 MG: 10 TABLET ORAL at 17:04

## 2020-03-16 RX ADMIN — PREGABALIN 300 MG: 100 CAPSULE ORAL at 17:04

## 2020-03-16 RX ADMIN — LIDOCAINE 1 PATCH: 50 PATCH TOPICAL at 05:14

## 2020-03-16 RX ADMIN — ENOXAPARIN SODIUM 40 MG: 100 INJECTION SUBCUTANEOUS at 05:14

## 2020-03-16 RX ADMIN — BUSPIRONE HYDROCHLORIDE 10 MG: 10 TABLET ORAL at 05:13

## 2020-03-16 RX ADMIN — OXYCODONE HYDROCHLORIDE AND ACETAMINOPHEN 1 TABLET: 5; 325 TABLET ORAL at 12:41

## 2020-03-16 RX ADMIN — ALBUTEROL SULFATE 2.5 MG: 2.5 SOLUTION RESPIRATORY (INHALATION) at 16:02

## 2020-03-16 RX ADMIN — IBUPROFEN 400 MG: 800 TABLET, FILM COATED ORAL at 23:28

## 2020-03-16 RX ADMIN — ASPIRIN 81 MG: 81 TABLET, COATED ORAL at 05:13

## 2020-03-16 RX ADMIN — ALBUTEROL SULFATE 2.5 MG: 2.5 SOLUTION RESPIRATORY (INHALATION) at 12:05

## 2020-03-16 RX ADMIN — IBUPROFEN 400 MG: 800 TABLET, FILM COATED ORAL at 09:38

## 2020-03-16 RX ADMIN — TRAZODONE HYDROCHLORIDE 50 MG: 100 TABLET ORAL at 17:04

## 2020-03-16 RX ADMIN — BUDESONIDE AND FORMOTEROL FUMARATE DIHYDRATE 2 PUFF: 160; 4.5 AEROSOL RESPIRATORY (INHALATION) at 05:12

## 2020-03-16 RX ADMIN — LIDOCAINE HYDROCHLORIDE 5 ML: 20 SOLUTION OROPHARYNGEAL at 09:23

## 2020-03-16 RX ADMIN — ZIPRASIDONE HYDROCHLORIDE 40 MG: 40 CAPSULE ORAL at 05:14

## 2020-03-16 RX ADMIN — ALBUTEROL SULFATE 2.5 MG: 2.5 SOLUTION RESPIRATORY (INHALATION) at 07:00

## 2020-03-16 RX ADMIN — MYCOPHENOLATE MOFETIL 1000 MG: 250 CAPSULE ORAL at 23:17

## 2020-03-16 RX ADMIN — LIDOCAINE HYDROCHLORIDE 5 ML: 20 SOLUTION OROPHARYNGEAL at 23:18

## 2020-03-16 RX ADMIN — OXYCODONE HYDROCHLORIDE AND ACETAMINOPHEN 1 TABLET: 5; 325 TABLET ORAL at 06:31

## 2020-03-16 RX ADMIN — MONTELUKAST 10 MG: 10 TABLET, FILM COATED ORAL at 23:17

## 2020-03-16 RX ADMIN — SENNOSIDES AND DOCUSATE SODIUM 2 TABLET: 8.6; 5 TABLET ORAL at 17:04

## 2020-03-16 RX ADMIN — BUDESONIDE AND FORMOTEROL FUMARATE DIHYDRATE 2 PUFF: 160; 4.5 AEROSOL RESPIRATORY (INHALATION) at 17:05

## 2020-03-16 RX ADMIN — PREDNISONE 5 MG: 5 TABLET ORAL at 05:13

## 2020-03-16 RX ADMIN — ZIPRASIDONE HYDROCHLORIDE 40 MG: 40 CAPSULE ORAL at 17:08

## 2020-03-16 RX ADMIN — OXCARBAZEPINE 150 MG: 150 TABLET, FILM COATED ORAL at 17:07

## 2020-03-16 NOTE — CARE PLAN
Problem: Safety  Goal: Will remain free from injury  Outcome: PROGRESSING AS EXPECTED  Goal: Will remain free from falls  Outcome: PROGRESSING AS EXPECTED  Appropriate Fall precautions in place. Patient oriented to room and call light. Patient educated regarding safety and fall precautions      Problem: Venous Thromboembolism (VTW)/Deep Vein Thrombosis (DVT) Prevention:  Goal: Patient will participate in Venous Thrombosis (VTE)/Deep Vein Thrombosis (DVT)Prevention Measures  Outcome: PROGRESSING AS EXPECTED  DVT prophylaxis in place. Ambulation, mobility, and frequent repositioning encouraged. Patient educated about DVT precautions and prevention.

## 2020-03-16 NOTE — PROGRESS NOTES
Received handoff report from day shift RN. The pt. is A&Ox4. At this time pt. has no c/o pain and states no needs.

## 2020-03-16 NOTE — CARE PLAN
Problem: Safety  Goal: Will remain free from injury  Outcome: PROGRESSING AS EXPECTED  Goal: Will remain free from falls  Outcome: PROGRESSING AS EXPECTED  Note: Fall precautions and bed alarm in place.      Problem: Venous Thromboembolism (VTW)/Deep Vein Thrombosis (DVT) Prevention:  Goal: Patient will participate in Venous Thrombosis (VTE)/Deep Vein Thrombosis (DVT)Prevention Measures  Outcome: PROGRESSING AS EXPECTED  Flowsheets (Taken 3/15/2020 1930)  Risk Assessment Score: 2  VTE RISK: Moderate  Mechanical Prophylaxis: SCDs, Sequential Compression Device  SCDs, Sequential Compression Device: Refused  Pharmacologic Prophylaxis Used: LMWH: Enoxaparin(Lovenox)

## 2020-03-16 NOTE — PROGRESS NOTES
Daily Progress Note:     Date of Service: 3/16/2020  Primary Team: [facility] White Team   Attending: Spencer Rios M.D.   Senior Resident: Dr. Bonds  Intern: Dr. Goodson  Contact:  [phone]    Chief Complaint:   Bilateral lower extremity weakness    Subjective:  Afebrile. Rapid response called for chest pain yesterday afternoon. Troponin negative x 2, EKG without ST segment changes or signs of acute ischemia.   Patient report chest pain resolved, she complains of back pain from laying in bed. Additionally, she reports burning sensation in her genital/urinary, currently receive treatment with clotrimazole. She is saturating well on room iar.   - CM to continue work on placement  - Discussed with patient to use lidocaine patches on lower back, now that right knee pain has resolved  - aphthous ulcer noted on left lateral tongue    Consultants/Specialty:  Neurology  Psychology  Neurophthalmology    Review of Systems:      Constitutional: Negative for chills and fever.   Eyes:        Chronic visual changes   Mouth: aphthous ulcer  Respiratory: Normal breathing, Negative for cough and hemoptysis.    Cardiovascular: Negative for chest pain, palpitations and leg swelling.   Gastrointestinal: Negative for abdominal pain, nausea and vomiting.   Genitourinary:        Reports burning sensation   Musculoskeletal: Positive for back pain chronic.    Neurological: Positive for headaches.        Chronic wooshing, chronic bilateral lower extremity weakness  Objective Data:   Physical Exam:   Vitals:   Temp:  [36.1 °C (97 °F)-36.9 °C (98.5 °F)] 36.9 °C (98.5 °F)  Pulse:  [65-78] 70  Resp:  [16-20] 18  BP: (102-116)/(48-64) 111/54  SpO2:  [93 %-98 %] 93 %     Constitutional:       General: She is not in acute distress.     Appearance: Normal appearance. She is obese.   HENT:      Head: Normocephalic and atraumatic. Mouth, erythema in tongue and roof of mouth without white plaque  Eyes:      Extraocular Movements: Extraocular  movements intact.      Pupils: Pupils are equal, round, and reactive to light.   Neck:      Musculoskeletal: Normal range of motion and neck supple.   Cardiovascular:      Rate and Rhythm: Normal rate and regular rhythm.      Heart sounds: Normal heart sounds.   Pulmonary:      Effort: Pulmonary effort is normal. No respiratory distress.      Breath sounds: Normal breath sounds. No expiratory wheezing.   Abdominal:      General: Bowel sounds are normal. There is no distension.      Palpations: Abdomen is soft.   Musculoskeletal:         General: mild tenderness present. No swelling or deformity.     Genital exam: (Performed 03/15/20 with her nurse as chaperone, no ulcers, skin irritation and redness mostly in the skin folds, she is morbidly obese.)     Back: examined, no erythema, no sores  Skin:     General: Skin is warm and dry.     Neurological:      Mental Status: She is alert and oriented to person, place, and time. Mental status is at baseline.         Labs:   Recent Labs     03/15/20  1451 03/16/20  1141   WBC 5.4 5.7   RBC 4.00* 3.93*   HEMOGLOBIN 11.9* 11.9*   HEMATOCRIT 37.1 36.6*   MCV 92.8 93.1   MCH 29.8 30.3   RDW 48.5 49.1   PLATELETCT 144* 163*   MPV 11.2 11.3   NEUTSPOLYS  --  79.40*   LYMPHOCYTES  --  14.20*   MONOCYTES  --  5.10   EOSINOPHILS  --  0.70   BASOPHILS  --  0.20     Recent Labs     03/15/20  1451 03/16/20  1141   SODIUM 140 141   POTASSIUM 3.6 3.9   CHLORIDE 116* 114*   CO2 14* 15*   GLUCOSE 101* 97   BUN 10 9       Imaging:   No new imaging    * Leg weakness, bilateral  Assessment & Plan  -Patient caregiver states she is unable to provide safe transfers between chair and bed  -PT states there is no more equipment they can provide to assist with it  -It is likely that patient will require long term care facility placement  -CT spine ruled out any acute pathology   -Patient unable to undergo MRI because of Stimulator in place.     Neurology consult, appreciate  recommendations  Neuropthalmology recommendation: continue med :   1. Diamox, 1000 mg p.o. b.i.d.  2. Maintain the prednisone 5 mg p.o. daily.  3. Maintain the CellCept 1000 mg p.o. b.i.d.  4. Follow up in neurophthalmology clinic at discharge  Psychiatric consult, continue psychiatric medication  Q4 neuro checks.   Pain control for LBP, right knee pain  Pending placement      Glossitis  Assessment & Plan  - Erythema on tongue and roof of mouth on examination without white plaque  - Glossitis, unknown etiology, ddx iatrogenic, nutitional  - albuterol induced glossitis vs symbicor induced thrush vs seebri induced glossitis  - B12 300s 02/2020, folate WNL ( 2019) , ferritin 100s (2018)  Plan  - BMX for pain.    - Encourage oral hydration, rinse mouth after symbicort use  - discontinue seebri  -Discussed with pharmacy, she is on multiple medications that can affect her immunity for can cause rash, will keep monitoring.    Intracranial pressure increased  Assessment & Plan  -Clinically stable.   -Headache with whooshing sounds  -Patient was previously on Lasix started at dose of 20mg in 2017 for bilateral lower extremity edema, and has been on lasix for several years. Her lasix 80mg daily was discontinued upon discharge from hospital 02/25/2020.   -Patient was started on Diamox / filled diamox 02/27/20 outpatient , dose increased while patient was inpatient during this hospitalization to 1000mg BID per neurology    Plan :  Resumed 1000mg Diamox         TONYA (obstructive sleep apnea)- (present on admission)  Assessment & Plan  Not on CPAP at home. Patient has not followed up with doctor yet for the same.   Follow up outpatient.     UTI (urinary tract infection)  Assessment & Plan  History of dysuria,  UA: Cloudy urine, nitrite positive, leukocyte esterase small, WBC 20-50, bacteria many  Urine culture: Lactose fermenting Gram negative zita   >100,000 cfu/mL -Klebsiella pneumoniae sensitive to bactrim.  Got Bactrim for 3  "days  - UA 03/11/20 with trace LE, negative nitrite and mod epithelial cell, low WBC, likely skin letty contaminate. Will wait for rflx cx    DM (diabetes mellitus) (HCC)- (present on admission)  Assessment & Plan  A1C 6.0 (08/2019)  Continue home meds.   Dulaglutide injection every Friday .   Diabetic diet.    Asthma- (present on admission)  Assessment & Plan  Continue home meds  Start montelukast and glycopyrronium bromide as per RT rec  Reported \"feeling like drowning\" improved after RT and bed positioning.   Saturating well on room air.     Hypothyroidism- (present on admission)  Assessment & Plan  TSH 5.65  Increased Levothyroxine to 100mcg on 3/3  Continue levothyroxine, Patient will need to followed up outpatient in 1-2 month (05/2020)      Joint pain  Assessment & Plan  Right knee pain significantly improved.   Patient report slipped off bed while in sitting position and landed on her knees and right elbow  She has significant pain on right knee upon palpation or movement  Given mechanism of injury will consider conservative management with rest, ice and lidocaine patch for right knee  Xray of R knee without bony abnormality or joint effusion  PLAN  Continue Ibuprofen 400mg Q6H Prn ordered for 3 days for acute episode of pain in right knee  Conservative management      "

## 2020-03-17 PROCEDURE — 700111 HCHG RX REV CODE 636 W/ 250 OVERRIDE (IP): Performed by: INTERNAL MEDICINE

## 2020-03-17 PROCEDURE — 700102 HCHG RX REV CODE 250 W/ 637 OVERRIDE(OP): Performed by: INTERNAL MEDICINE

## 2020-03-17 PROCEDURE — A9270 NON-COVERED ITEM OR SERVICE: HCPCS | Performed by: STUDENT IN AN ORGANIZED HEALTH CARE EDUCATION/TRAINING PROGRAM

## 2020-03-17 PROCEDURE — 96372 THER/PROPH/DIAG INJ SC/IM: CPT

## 2020-03-17 PROCEDURE — 700102 HCHG RX REV CODE 250 W/ 637 OVERRIDE(OP): Performed by: STUDENT IN AN ORGANIZED HEALTH CARE EDUCATION/TRAINING PROGRAM

## 2020-03-17 PROCEDURE — 700101 HCHG RX REV CODE 250: Performed by: INTERNAL MEDICINE

## 2020-03-17 PROCEDURE — 700111 HCHG RX REV CODE 636 W/ 250 OVERRIDE (IP): Performed by: STUDENT IN AN ORGANIZED HEALTH CARE EDUCATION/TRAINING PROGRAM

## 2020-03-17 PROCEDURE — 99224 PR SUBSEQUENT OBSERVATION CARE,LEVEL I: CPT | Mod: GC | Performed by: INTERNAL MEDICINE

## 2020-03-17 PROCEDURE — A9270 NON-COVERED ITEM OR SERVICE: HCPCS | Performed by: PSYCHIATRY & NEUROLOGY

## 2020-03-17 PROCEDURE — 700101 HCHG RX REV CODE 250: Performed by: STUDENT IN AN ORGANIZED HEALTH CARE EDUCATION/TRAINING PROGRAM

## 2020-03-17 PROCEDURE — G0378 HOSPITAL OBSERVATION PER HR: HCPCS

## 2020-03-17 PROCEDURE — 99214 OFFICE O/P EST MOD 30 MIN: CPT | Mod: GC | Performed by: PSYCHIATRY & NEUROLOGY

## 2020-03-17 PROCEDURE — A9270 NON-COVERED ITEM OR SERVICE: HCPCS | Performed by: INTERNAL MEDICINE

## 2020-03-17 PROCEDURE — 700102 HCHG RX REV CODE 250 W/ 637 OVERRIDE(OP): Performed by: PSYCHIATRY & NEUROLOGY

## 2020-03-17 RX ORDER — MODAFINIL 100 MG/1
200 TABLET ORAL EVERY MORNING
Status: DISCONTINUED | OUTPATIENT
Start: 2020-03-18 | End: 2020-03-24 | Stop reason: HOSPADM

## 2020-03-17 RX ADMIN — ZIPRASIDONE HYDROCHLORIDE 40 MG: 40 CAPSULE ORAL at 16:42

## 2020-03-17 RX ADMIN — SENNOSIDES AND DOCUSATE SODIUM 2 TABLET: 8.6; 5 TABLET ORAL at 16:41

## 2020-03-17 RX ADMIN — LIDOCAINE HYDROCHLORIDE 5 ML: 20 SOLUTION OROPHARYNGEAL at 12:38

## 2020-03-17 RX ADMIN — BUDESONIDE AND FORMOTEROL FUMARATE DIHYDRATE 2 PUFF: 160; 4.5 AEROSOL RESPIRATORY (INHALATION) at 16:40

## 2020-03-17 RX ADMIN — PREGABALIN 300 MG: 100 CAPSULE ORAL at 16:41

## 2020-03-17 RX ADMIN — MONTELUKAST 10 MG: 10 TABLET, FILM COATED ORAL at 20:42

## 2020-03-17 RX ADMIN — PREDNISONE 5 MG: 5 TABLET ORAL at 05:55

## 2020-03-17 RX ADMIN — FLUOXETINE HYDROCHLORIDE 40 MG: 20 CAPSULE ORAL at 05:52

## 2020-03-17 RX ADMIN — TRAZODONE HYDROCHLORIDE 50 MG: 100 TABLET ORAL at 16:42

## 2020-03-17 RX ADMIN — ACETAZOLAMIDE 1000 MG: 500 CAPSULE, EXTENDED RELEASE ORAL at 16:41

## 2020-03-17 RX ADMIN — BUDESONIDE AND FORMOTEROL FUMARATE DIHYDRATE 2 PUFF: 160; 4.5 AEROSOL RESPIRATORY (INHALATION) at 05:58

## 2020-03-17 RX ADMIN — LIDOCAINE 1 PATCH: 50 PATCH TOPICAL at 12:38

## 2020-03-17 RX ADMIN — CLOTRIMAZOLE: 10 CREAM TOPICAL at 05:58

## 2020-03-17 RX ADMIN — OXCARBAZEPINE 150 MG: 150 TABLET, FILM COATED ORAL at 16:42

## 2020-03-17 RX ADMIN — SENNOSIDES AND DOCUSATE SODIUM 2 TABLET: 8.6; 5 TABLET ORAL at 05:53

## 2020-03-17 RX ADMIN — POLYETHYLENE GLYCOL (3350) 1 PACKET: 17 POWDER, FOR SOLUTION ORAL at 16:41

## 2020-03-17 RX ADMIN — ALBUTEROL SULFATE 2 PUFF: 90 AEROSOL, METERED RESPIRATORY (INHALATION) at 10:30

## 2020-03-17 RX ADMIN — LIDOCAINE 1 PATCH: 50 PATCH TOPICAL at 06:02

## 2020-03-17 RX ADMIN — OXYCODONE HYDROCHLORIDE AND ACETAMINOPHEN 1 TABLET: 5; 325 TABLET ORAL at 20:42

## 2020-03-17 RX ADMIN — PREGABALIN 300 MG: 100 CAPSULE ORAL at 05:53

## 2020-03-17 RX ADMIN — LEVOTHYROXINE SODIUM 100 MCG: 100 TABLET ORAL at 05:51

## 2020-03-17 RX ADMIN — ENOXAPARIN SODIUM 40 MG: 100 INJECTION SUBCUTANEOUS at 05:52

## 2020-03-17 RX ADMIN — OXCARBAZEPINE 150 MG: 150 TABLET, FILM COATED ORAL at 05:56

## 2020-03-17 RX ADMIN — ACETAZOLAMIDE 1000 MG: 500 CAPSULE, EXTENDED RELEASE ORAL at 05:52

## 2020-03-17 RX ADMIN — ONDANSETRON 4 MG: 4 TABLET, ORALLY DISINTEGRATING ORAL at 16:19

## 2020-03-17 RX ADMIN — BUSPIRONE HYDROCHLORIDE 10 MG: 10 TABLET ORAL at 05:55

## 2020-03-17 RX ADMIN — OXYCODONE HYDROCHLORIDE AND ACETAMINOPHEN 1 TABLET: 5; 325 TABLET ORAL at 05:57

## 2020-03-17 RX ADMIN — MYCOPHENOLATE MOFETIL 1000 MG: 250 CAPSULE ORAL at 16:41

## 2020-03-17 RX ADMIN — LIDOCAINE HYDROCHLORIDE 5 ML: 20 SOLUTION OROPHARYNGEAL at 05:57

## 2020-03-17 RX ADMIN — ZIPRASIDONE HYDROCHLORIDE 40 MG: 40 CAPSULE ORAL at 06:03

## 2020-03-17 RX ADMIN — OXYCODONE HYDROCHLORIDE AND ACETAMINOPHEN 1 TABLET: 5; 325 TABLET ORAL at 11:24

## 2020-03-17 RX ADMIN — BUSPIRONE HYDROCHLORIDE 10 MG: 10 TABLET ORAL at 16:41

## 2020-03-17 RX ADMIN — ASPIRIN 81 MG: 81 TABLET, COATED ORAL at 05:53

## 2020-03-17 RX ADMIN — MYCOPHENOLATE MOFETIL 1000 MG: 250 CAPSULE ORAL at 05:59

## 2020-03-17 RX ADMIN — CLOTRIMAZOLE: 10 CREAM TOPICAL at 16:42

## 2020-03-17 ASSESSMENT — ENCOUNTER SYMPTOMS
WEAKNESS: 1
HEADACHES: 1
DEPRESSION: 1
NAUSEA: 0
INSOMNIA: 1
BRUISES/BLEEDS EASILY: 0
BACK PAIN: 1
HALLUCINATIONS: 0
BLURRED VISION: 0
FEVER: 0
SORE THROAT: 0
COUGH: 1

## 2020-03-17 ASSESSMENT — PAIN SCALES - WONG BAKER
WONGBAKER_NUMERICALRESPONSE: DOESN'T HURT AT ALL
WONGBAKER_NUMERICALRESPONSE: DOESN'T HURT AT ALL

## 2020-03-17 NOTE — CARE PLAN
Problem: Safety  Goal: Will remain free from injury  Outcome: PROGRESSING AS EXPECTED  Goal: Will remain free from falls  Outcome: PROGRESSING AS EXPECTED     Problem: Venous Thromboembolism (VTW)/Deep Vein Thrombosis (DVT) Prevention:  Goal: Patient will participate in Venous Thrombosis (VTE)/Deep Vein Thrombosis (DVT)Prevention Measures  Outcome: PROGRESSING AS EXPECTED  Flowsheets (Taken 3/17/2020 0644)  Pharmacologic Prophylaxis Used: LMWH: Enoxaparin(Lovenox)  Note: Patient on lovenox - ambulation encouraged      Problem: Pain Management  Goal: Pain level will decrease to patient's comfort goal  Outcome: PROGRESSING AS EXPECTED     Problem: Urinary Elimination:  Goal: Ability to reestablish a normal urinary elimination pattern will improve  Outcome: PROGRESSING AS EXPECTED     Problem: Skin Integrity  Goal: Risk for impaired skin integrity will decrease  Outcome: PROGRESSING AS EXPECTED  Note: Lotrimin applied to groin rash.  Scattered bruising throughout and scabs noted to abdomen - patient states they are from her cat.      Problem: Psychosocial Needs:  Goal: Level of anxiety will decrease  Outcome: PROGRESSING AS EXPECTED  Note: Medications given per emar.

## 2020-03-17 NOTE — CARE PLAN
Problem: Bronchoconstriction:  Goal: Improve in air movement and diminished wheezing  Outcome: PROGRESSING AS EXPECTED   Patient receiving Symbicort BID. Home reg includes Albuterol and Duoneb PRN, Symbicort and Spiriva. Patient breathing RA.

## 2020-03-17 NOTE — PROGRESS NOTES
Assumed care from Kaley at 1530. Incontinence care provided. Pt up to wheelchair w/ Ax1; remained in chair for 1 hr, tolerated well.

## 2020-03-17 NOTE — PROGRESS NOTES
Daily Progress Note:     Date of Service: 3/17/2020  Primary Team: [facility] White Team   Attending: Spencer Rios M.D.   Senior Resident: Dr. Adams  Intern: Dr. Goodson  Contact:  [phone]    Chief Complaint:   Bilateral lower extremity weakness    Subjective  Afebrile. No acute overnight event.  Patient report she is doing okay, back pain improved with lidocaine patch. Continues to have right foot pain due to blisters. Report she was on wheel chair yesterday. Denies fever, chills chest papin, short of breath.   - awaiting placement    Consultants/Specialty:  Neurology  Psychology  Neurophthalmology    Review of Systems:    Constitutional: Negative for chills and fever.   Eyes:        Chronic visual changes   Mouth: aphthous ulcer  Respiratory: Normal breathing, Negative for cough and hemoptysis.    Cardiovascular: Negative for chest pain, palpitations and leg swelling.   Gastrointestinal: Negative for abdominal pain, nausea and vomiting.   Genitourinary:        Reports burning sensation   Musculoskeletal: Positive for back pain chronic.    Neurological: Positive for headaches.        Chronic wooshing, chronic bilateral lower extremity weakness    Objective Data:   Physical Exam:   Vitals:   Temp:  [36.3 °C (97.4 °F)-36.6 °C (97.9 °F)] 36.4 °C (97.6 °F)  Pulse:  [68-90] 68  Resp:  [16-20] 17  BP: ()/(44-68) 99/44  SpO2:  [94 %-98 %] 95 %     Constitutional:       General: She is not in acute distress.     Appearance: Normal appearance. She is obese.   HENT:      Head: Normocephalic and atraumatic. Mouth, erythema in tongue and roof of mouth without white plaque  Eyes:      Extraocular Movements: Extraocular movements intact.      Pupils: Pupils are equal, round, and reactive to light.   Neck:      Musculoskeletal: Normal range of motion and neck supple.   Cardiovascular:      Rate and Rhythm: Normal rate and regular rhythm.      Heart sounds: Normal heart sounds.   Pulmonary:      Effort: Pulmonary  effort is normal. No respiratory distress.      Breath sounds: Normal breath sounds. No expiratory wheezing.   Abdominal:      General: Bowel sounds are normal. There is no distension.      Palpations: Abdomen is soft.   Musculoskeletal:         General: mild tenderness present. No swelling or deformity.     Genital exam: (Performed 03/15/20 with her nurse as chaperone, no ulcers, skin irritation and redness mostly in the skin folds, she is morbidly obese.)     Back: examined, no erythema, no sores  Skin:     General: Skin is warm and dry.     Neurological:      Mental Status: She is alert and oriented to person, place, and time. Mental status is at baseline.      Labs:   Weekly lab    Recent Labs     03/15/20  1451 03/16/20  1141   WBC 5.4 5.7   RBC 4.00* 3.93*   HEMOGLOBIN 11.9* 11.9*   HEMATOCRIT 37.1 36.6*   MCV 92.8 93.1   MCH 29.8 30.3   RDW 48.5 49.1   PLATELETCT 144* 163*   MPV 11.2 11.3   NEUTSPOLYS  --  79.40*   LYMPHOCYTES  --  14.20*   MONOCYTES  --  5.10   EOSINOPHILS  --  0.70   BASOPHILS  --  0.20     Recent Labs     03/15/20  1451 03/16/20  1141   SODIUM 140 141   POTASSIUM 3.6 3.9   CHLORIDE 116* 114*   CO2 14* 15*   GLUCOSE 101* 97   BUN 10 9       Imaging:   No new imaging    * Leg weakness, bilateral  Assessment & Plan  -Patient caregiver states she is unable to provide safe transfers between chair and bed  -PT states there is no more equipment they can provide to assist with it  -It is likely that patient will require long term care facility placement  -CT spine ruled out any acute pathology   -Patient unable to undergo MRI because of Stimulator in place.     Neurology consult, appreciate recommendations  Neuropthalmology recommendation: continue med :   1. Diamox, 1000 mg p.o. b.i.d.  2. Maintain the prednisone 5 mg p.o. daily.  3. Maintain the CellCept 1000 mg p.o. b.i.d.  4. Follow up in neurophthalmology clinic at discharge  Psychiatric consult, continue psychiatric medication  Q4 neuro  checks.   Pain control for LBP, right knee pain  Pending placement      Glossitis  Assessment & Plan  - Erythema on tongue and roof of mouth on examination without white plaque  - Glossitis, unknown etiology, ddx iatrogenic, nutitional  - albuterol induced glossitis vs symbicor induced thrush vs seebri induced glossitis  - B12 300s 02/2020, folate WNL ( 2019) , ferritin 100s (2018)  Plan  - BMX for pain.    - Encourage oral hydration, rinse mouth after symbicort use  - discontinue seebri  -Discussed with pharmacy, she is on multiple medications that can affect her immunity for can cause rash, will keep monitoring.    Intracranial pressure increased  Assessment & Plan  -Clinically stable.   -Headache with whooshing sounds  -Patient was previously on Lasix started at dose of 20mg in 2017 for bilateral lower extremity edema, and has been on lasix for several years. Her lasix 80mg daily was discontinued upon discharge from hospital 02/25/2020.   -Patient was started on Diamox / filled diamox 02/27/20 outpatient , dose increased while patient was inpatient during this hospitalization to 1000mg BID per neurology    Plan :  Resumed 1000mg Diamox         TONYA (obstructive sleep apnea)- (present on admission)  Assessment & Plan  Not on CPAP at home. Patient has not followed up with doctor yet for the same.   Follow up outpatient.     UTI (urinary tract infection)  Assessment & Plan  History of dysuria,  UA: Cloudy urine, nitrite positive, leukocyte esterase small, WBC 20-50, bacteria many  Urine culture: Lactose fermenting Gram negative zita   >100,000 cfu/mL -Klebsiella pneumoniae sensitive to bactrim.  Got Bactrim for 3 days  - UA 03/11/20 with trace LE, negative nitrite and mod epithelial cell, low WBC, likely skin letty contaminate. Will wait for rflx cx    DM (diabetes mellitus) ([facility])- (present on admission)  Assessment & Plan  A1C 6.0 (08/2019)  Continue home meds.   Dulaglutide injection every Friday .   Diabetic  "diet.    Asthma- (present on admission)  Assessment & Plan  Continue home meds  Start montelukast and glycopyrronium bromide as per RT rec  Reported \"feeling like drowning\" improved after RT and bed positioning.   Saturating well on room air.     Hypothyroidism- (present on admission)  Assessment & Plan  TSH 5.65  Increased Levothyroxine to 100mcg on 3/3  Continue levothyroxine, Patient will need to followed up outpatient in 1-2 month (05/2020)      Joint pain  Assessment & Plan  Right knee pain significantly improved.   Patient report slipped off bed while in sitting position and landed on her knees and right elbow  She has significant pain on right knee upon palpation or movement  Given mechanism of injury will consider conservative management with rest, ice and lidocaine patch for right knee  Xray of R knee without bony abnormality or joint effusion  PLAN  Continue Ibuprofen 400mg Q6H Prn ordered for 3 days for acute episode of pain in right knee  Conservative management        "

## 2020-03-17 NOTE — CARE PLAN
Problem: Safety  Goal: Will remain free from injury  Outcome: PROGRESSING AS EXPECTED     Problem: Respiratory:  Goal: Respiratory status will improve  Outcome: PROGRESSING AS EXPECTED     Problem: Pain Management  Goal: Pain level will decrease to patient's comfort goal  Outcome: PROGRESSING AS EXPECTED

## 2020-03-18 PROCEDURE — 700111 HCHG RX REV CODE 636 W/ 250 OVERRIDE (IP): Performed by: STUDENT IN AN ORGANIZED HEALTH CARE EDUCATION/TRAINING PROGRAM

## 2020-03-18 PROCEDURE — A9270 NON-COVERED ITEM OR SERVICE: HCPCS | Performed by: INTERNAL MEDICINE

## 2020-03-18 PROCEDURE — 700101 HCHG RX REV CODE 250: Performed by: INTERNAL MEDICINE

## 2020-03-18 PROCEDURE — 700102 HCHG RX REV CODE 250 W/ 637 OVERRIDE(OP): Performed by: PSYCHIATRY & NEUROLOGY

## 2020-03-18 PROCEDURE — G0378 HOSPITAL OBSERVATION PER HR: HCPCS

## 2020-03-18 PROCEDURE — 99225 PR SUBSEQUENT OBSERVATION CARE,LEVEL II: CPT | Mod: GC | Performed by: INTERNAL MEDICINE

## 2020-03-18 PROCEDURE — 700102 HCHG RX REV CODE 250 W/ 637 OVERRIDE(OP): Performed by: STUDENT IN AN ORGANIZED HEALTH CARE EDUCATION/TRAINING PROGRAM

## 2020-03-18 PROCEDURE — A9270 NON-COVERED ITEM OR SERVICE: HCPCS | Performed by: STUDENT IN AN ORGANIZED HEALTH CARE EDUCATION/TRAINING PROGRAM

## 2020-03-18 PROCEDURE — 700101 HCHG RX REV CODE 250: Performed by: STUDENT IN AN ORGANIZED HEALTH CARE EDUCATION/TRAINING PROGRAM

## 2020-03-18 PROCEDURE — 700102 HCHG RX REV CODE 250 W/ 637 OVERRIDE(OP): Performed by: INTERNAL MEDICINE

## 2020-03-18 PROCEDURE — 96372 THER/PROPH/DIAG INJ SC/IM: CPT

## 2020-03-18 PROCEDURE — A9270 NON-COVERED ITEM OR SERVICE: HCPCS | Performed by: PSYCHIATRY & NEUROLOGY

## 2020-03-18 PROCEDURE — 700111 HCHG RX REV CODE 636 W/ 250 OVERRIDE (IP): Performed by: INTERNAL MEDICINE

## 2020-03-18 RX ADMIN — OXYCODONE HYDROCHLORIDE AND ACETAMINOPHEN 1 TABLET: 5; 325 TABLET ORAL at 13:23

## 2020-03-18 RX ADMIN — PREDNISONE 5 MG: 5 TABLET ORAL at 05:42

## 2020-03-18 RX ADMIN — LEVOTHYROXINE SODIUM 100 MCG: 100 TABLET ORAL at 05:42

## 2020-03-18 RX ADMIN — BUSPIRONE HYDROCHLORIDE 10 MG: 10 TABLET ORAL at 05:41

## 2020-03-18 RX ADMIN — TRAZODONE HYDROCHLORIDE 50 MG: 100 TABLET ORAL at 17:57

## 2020-03-18 RX ADMIN — ACETAZOLAMIDE 1000 MG: 500 CAPSULE, EXTENDED RELEASE ORAL at 17:57

## 2020-03-18 RX ADMIN — ACETAMINOPHEN 650 MG: 325 TABLET, FILM COATED ORAL at 16:40

## 2020-03-18 RX ADMIN — BUSPIRONE HYDROCHLORIDE 10 MG: 10 TABLET ORAL at 17:57

## 2020-03-18 RX ADMIN — LIDOCAINE 1 PATCH: 50 PATCH TOPICAL at 05:40

## 2020-03-18 RX ADMIN — CLOTRIMAZOLE: 10 CREAM TOPICAL at 05:41

## 2020-03-18 RX ADMIN — MYCOPHENOLATE MOFETIL 1000 MG: 250 CAPSULE ORAL at 08:44

## 2020-03-18 RX ADMIN — ZIPRASIDONE HYDROCHLORIDE 40 MG: 40 CAPSULE ORAL at 05:41

## 2020-03-18 RX ADMIN — BUDESONIDE AND FORMOTEROL FUMARATE DIHYDRATE 2 PUFF: 160; 4.5 AEROSOL RESPIRATORY (INHALATION) at 17:56

## 2020-03-18 RX ADMIN — LIDOCAINE HYDROCHLORIDE 5 ML: 20 SOLUTION OROPHARYNGEAL at 13:23

## 2020-03-18 RX ADMIN — PREGABALIN 300 MG: 100 CAPSULE ORAL at 05:41

## 2020-03-18 RX ADMIN — OXCARBAZEPINE 150 MG: 150 TABLET, FILM COATED ORAL at 05:42

## 2020-03-18 RX ADMIN — SENNOSIDES AND DOCUSATE SODIUM 2 TABLET: 8.6; 5 TABLET ORAL at 05:42

## 2020-03-18 RX ADMIN — ZIPRASIDONE HYDROCHLORIDE 40 MG: 40 CAPSULE ORAL at 17:57

## 2020-03-18 RX ADMIN — LIDOCAINE 1 PATCH: 50 PATCH TOPICAL at 13:25

## 2020-03-18 RX ADMIN — FLUOXETINE HYDROCHLORIDE 40 MG: 20 CAPSULE ORAL at 05:42

## 2020-03-18 RX ADMIN — OXYCODONE HYDROCHLORIDE AND ACETAMINOPHEN 1 TABLET: 5; 325 TABLET ORAL at 08:44

## 2020-03-18 RX ADMIN — ENOXAPARIN SODIUM 40 MG: 100 INJECTION SUBCUTANEOUS at 05:40

## 2020-03-18 RX ADMIN — LIDOCAINE HYDROCHLORIDE 5 ML: 20 SOLUTION OROPHARYNGEAL at 20:25

## 2020-03-18 RX ADMIN — BUDESONIDE AND FORMOTEROL FUMARATE DIHYDRATE 2 PUFF: 160; 4.5 AEROSOL RESPIRATORY (INHALATION) at 05:42

## 2020-03-18 RX ADMIN — OXYCODONE HYDROCHLORIDE AND ACETAMINOPHEN 1 TABLET: 5; 325 TABLET ORAL at 18:00

## 2020-03-18 RX ADMIN — ONDANSETRON 4 MG: 4 TABLET, ORALLY DISINTEGRATING ORAL at 17:00

## 2020-03-18 RX ADMIN — MONTELUKAST 10 MG: 10 TABLET, FILM COATED ORAL at 20:20

## 2020-03-18 RX ADMIN — PREGABALIN 300 MG: 100 CAPSULE ORAL at 17:57

## 2020-03-18 RX ADMIN — MODAFINIL 200 MG: 100 TABLET ORAL at 05:41

## 2020-03-18 RX ADMIN — ACETAZOLAMIDE 1000 MG: 500 CAPSULE, EXTENDED RELEASE ORAL at 05:41

## 2020-03-18 RX ADMIN — CLOTRIMAZOLE: 10 CREAM TOPICAL at 17:58

## 2020-03-18 RX ADMIN — OXCARBAZEPINE 150 MG: 150 TABLET, FILM COATED ORAL at 17:57

## 2020-03-18 RX ADMIN — ASPIRIN 81 MG: 81 TABLET, COATED ORAL at 05:42

## 2020-03-18 RX ADMIN — MYCOPHENOLATE MOFETIL 1000 MG: 250 CAPSULE ORAL at 20:25

## 2020-03-18 NOTE — PROGRESS NOTES
Daily Progress Note:     Date of Service: 3/18/2020  Primary Team: [facility] White Team   Attending: Spencer Rios M.D.   Senior Resident: Dr. Adams  Intern: Dr. Goodson  Contact:  [phone]    Chief Complaint:   Bilateral lower extremity weakness    Subjective  States that she has not been eating and drinking well because of the aphthous ulcers in her mouth- MBX to be given prior to meals, notified RN  Also complains of mild nasal congestion  Feels that her bladder is full, requesting for a bladder scan    Awaiting placement    Consultants/Specialty:  Neurology  Psychology  Neurophthalmology    Review of Systems:    Constitutional: Negative for chills and fever.   Eyes:        Chronic visual changes   Mouth: aphthous ulcer  Respiratory: Normal breathing, Negative for cough and hemoptysis.    Cardiovascular: Negative for chest pain, palpitations and leg swelling.   Gastrointestinal: Negative for abdominal pain, nausea and vomiting.   Genitourinary:        Reports burning sensation   Musculoskeletal: Positive for back pain chronic.    Neurological: Positive for headaches.        Chronic wooshing, chronic bilateral lower extremity weakness    Objective Data:   Physical Exam:   Vitals:   Temp:  [36.3 °C (97.4 °F)-37.1 °C (98.7 °F)] 36.8 °C (98.3 °F)  Pulse:  [69-76] 76  Resp:  [16-19] 19  BP: ()/(47-67) 97/47  SpO2:  [94 %-98 %] 95 %     Constitutional:       General: She is not in acute distress.     Appearance: Normal appearance. She is obese.   HENT:      Head: Normocephalic and atraumatic. Mouth, erythema in tongue and roof of mouth without white plaque  Eyes:      Extraocular Movements: Extraocular movements intact.      Pupils: Pupils are equal, round, and reactive to light.   Neck:      Musculoskeletal: Normal range of motion and neck supple.   Cardiovascular:      Rate and Rhythm: Normal rate and regular rhythm.      Heart sounds: Normal heart sounds.   Pulmonary:      Effort: Pulmonary effort is  normal. No respiratory distress.      Breath sounds: Normal breath sounds. No expiratory wheezing.   Abdominal:      General: Bowel sounds are normal. There is no distension.      Palpations: Abdomen is soft.   Musculoskeletal:         General: mild tenderness present. No swelling or deformity.     Genital exam: (Performed 03/15/20 with her nurse as chaperone, no ulcers, skin irritation and redness mostly in the skin folds, she is morbidly obese.)     Back: examined, no erythema, no sores  Skin:     General: Skin is warm and dry.     Neurological:      Mental Status: She is alert and oriented to person, place, and time. Mental status is at baseline.      Labs:   Weekly lab    Recent Labs     03/16/20  1141   WBC 5.7   RBC 3.93*   HEMOGLOBIN 11.9*   HEMATOCRIT 36.6*   MCV 93.1   MCH 30.3   RDW 49.1   PLATELETCT 163*   MPV 11.3   NEUTSPOLYS 79.40*   LYMPHOCYTES 14.20*   MONOCYTES 5.10   EOSINOPHILS 0.70   BASOPHILS 0.20     Recent Labs     03/16/20  1141   SODIUM 141   POTASSIUM 3.9   CHLORIDE 114*   CO2 15*   GLUCOSE 97   BUN 9       Imaging:   No new imaging    * Leg weakness, bilateral  Assessment & Plan  -Patient caregiver states she is unable to provide safe transfers between chair and bed  -PT states there is no more equipment they can provide to assist with it  -It is likely that patient will require long term care facility placement  -CT spine ruled out any acute pathology   -Patient unable to undergo MRI because of Stimulator in place.     Neurology consult, appreciate recommendations  Neuropthalmology recommendation: continue med :   1. Diamox, 1000 mg p.o. b.i.d.  2. Maintain the prednisone 5 mg p.o. daily.  3. Maintain the CellCept 1000 mg p.o. b.i.d.  4. Follow up in neurophthalmology clinic at discharge  Psychiatric consult, continue psychiatric medication  Q4 neuro checks.   Pain control for LBP, right knee pain  Pending placement      Glossitis  Assessment & Plan  - Erythema on tongue and roof of mouth  on examination without white plaque  - Glossitis, unknown etiology, ddx iatrogenic, nutitional  - albuterol induced glossitis vs symbicor induced thrush vs seebri induced glossitis  - B12 300s 02/2020, folate WNL ( 2019) , ferritin 100s (2018)  Plan  - BMX for pain.    - Encourage oral hydration, rinse mouth after symbicort use  - discontinue seebri  -Discussed with pharmacy, she is on multiple medications that can affect her immunity for can cause rash, will keep monitoring.    Intracranial pressure increased  Assessment & Plan  -Clinically stable.   -Headache with whooshing sounds  -Patient was previously on Lasix started at dose of 20mg in 2017 for bilateral lower extremity edema, and has been on lasix for several years. Her lasix 80mg daily was discontinued upon discharge from hospital 02/25/2020.   -Patient was started on Diamox / filled diamox 02/27/20 outpatient , dose increased while patient was inpatient during this hospitalization to 1000mg BID per neurology    Plan :  Resumed 1000mg Diamox         TONYA (obstructive sleep apnea)- (present on admission)  Assessment & Plan  Not on CPAP at home. Patient has not followed up with doctor yet for the same.   Follow up outpatient.     UTI (urinary tract infection)  Assessment & Plan  History of dysuria,  UA: Cloudy urine, nitrite positive, leukocyte esterase small, WBC 20-50, bacteria many  Urine culture: Lactose fermenting Gram negative zita   >100,000 cfu/mL -Klebsiella pneumoniae sensitive to bactrim.  Got Bactrim for 3 days  - UA 03/11/20 with trace LE, negative nitrite and mod epithelial cell, low WBC, likely skin letty contaminate. Will wait for rflx cx    DM (diabetes mellitus) ([facility])- (present on admission)  Assessment & Plan  A1C 6.0 (08/2019)  Continue home meds.   Dulaglutide injection every Friday .   Diabetic diet.    Asthma- (present on admission)  Assessment & Plan  Continue home meds  Start montelukast and glycopyrronium bromide as per RT  rec  Saturating well on room air.     Hypothyroidism- (present on admission)  Assessment & Plan  TSH 5.65  Increased Levothyroxine to 100mcg on 3/3  Continue levothyroxine, Patient will need to followed up outpatient in 1-2 month (05/2020)      Joint pain  Assessment & Plan  Right knee pain significantly improved.   Patient report slipped off bed while in sitting position and landed on her knees and right elbow  She has significant pain on right knee upon palpation or movement  Given mechanism of injury will consider conservative management with rest, ice and lidocaine patch for right knee  Xray of R knee without bony abnormality or joint effusion  PLAN  Continue Ibuprofen 400mg Q6H Prn ordered for 3 days for acute episode of pain in right knee  Conservative management

## 2020-03-18 NOTE — DIETARY
Nutrition Services: Consult  Consult received for Boost supplements as pt drinks these at home.  Will add - per chart, pt with variable PO intake in addition; noted to be refusing some meals or <25%.  RD(s) will continue to monitor for sufficient intake.

## 2020-03-19 ENCOUNTER — APPOINTMENT (OUTPATIENT)
Dept: RADIOLOGY | Facility: MEDICAL CENTER | Age: 30
DRG: 880 | End: 2020-03-19
Attending: STUDENT IN AN ORGANIZED HEALTH CARE EDUCATION/TRAINING PROGRAM
Payer: MEDICARE

## 2020-03-19 PROBLEM — R20.0 NUMBNESS: Status: ACTIVE | Noted: 2020-03-19

## 2020-03-19 LAB
ALBUMIN SERPL BCP-MCNC: 4.1 G/DL (ref 3.2–4.9)
ALBUMIN/GLOB SERPL: 2.2 G/DL
ALP SERPL-CCNC: 55 U/L (ref 30–99)
ALT SERPL-CCNC: 15 U/L (ref 2–50)
ANION GAP SERPL CALC-SCNC: 11 MMOL/L (ref 7–16)
AST SERPL-CCNC: 8 U/L (ref 12–45)
BASOPHILS # BLD AUTO: 0.3 % (ref 0–1.8)
BASOPHILS # BLD: 0.02 K/UL (ref 0–0.12)
BILIRUB SERPL-MCNC: 0.3 MG/DL (ref 0.1–1.5)
BUN SERPL-MCNC: 9 MG/DL (ref 8–22)
CALCIUM SERPL-MCNC: 8.9 MG/DL (ref 8.5–10.5)
CHLORIDE SERPL-SCNC: 112 MMOL/L (ref 96–112)
CO2 SERPL-SCNC: 15 MMOL/L (ref 20–33)
CREAT SERPL-MCNC: 0.71 MG/DL (ref 0.5–1.4)
EOSINOPHIL # BLD AUTO: 0.08 K/UL (ref 0–0.51)
EOSINOPHIL NFR BLD: 1.4 % (ref 0–6.9)
ERYTHROCYTE [DISTWIDTH] IN BLOOD BY AUTOMATED COUNT: 47.6 FL (ref 35.9–50)
GLOBULIN SER CALC-MCNC: 1.9 G/DL (ref 1.9–3.5)
GLUCOSE SERPL-MCNC: 85 MG/DL (ref 65–99)
HCT VFR BLD AUTO: 38.3 % (ref 37–47)
HGB BLD-MCNC: 12.4 G/DL (ref 12–16)
IMM GRANULOCYTES # BLD AUTO: 0.02 K/UL (ref 0–0.11)
IMM GRANULOCYTES NFR BLD AUTO: 0.3 % (ref 0–0.9)
LYMPHOCYTES # BLD AUTO: 0.8 K/UL (ref 1–4.8)
LYMPHOCYTES NFR BLD: 13.9 % (ref 22–41)
MCH RBC QN AUTO: 30.6 PG (ref 27–33)
MCHC RBC AUTO-ENTMCNC: 32.4 G/DL (ref 33.6–35)
MCV RBC AUTO: 94.6 FL (ref 81.4–97.8)
MONOCYTES # BLD AUTO: 0.33 K/UL (ref 0–0.85)
MONOCYTES NFR BLD AUTO: 5.7 % (ref 0–13.4)
NEUTROPHILS # BLD AUTO: 4.51 K/UL (ref 2–7.15)
NEUTROPHILS NFR BLD: 78.4 % (ref 44–72)
NRBC # BLD AUTO: 0 K/UL
NRBC BLD-RTO: 0 /100 WBC
PLATELET # BLD AUTO: 144 K/UL (ref 164–446)
PMV BLD AUTO: 12 FL (ref 9–12.9)
POTASSIUM SERPL-SCNC: 3.4 MMOL/L (ref 3.6–5.5)
PROT SERPL-MCNC: 6 G/DL (ref 6–8.2)
RBC # BLD AUTO: 4.05 M/UL (ref 4.2–5.4)
SODIUM SERPL-SCNC: 138 MMOL/L (ref 135–145)
WBC # BLD AUTO: 5.8 K/UL (ref 4.8–10.8)

## 2020-03-19 PROCEDURE — 99226 PR SUBSEQUENT OBSERVATION CARE,LEVEL III: CPT | Mod: GC | Performed by: INTERNAL MEDICINE

## 2020-03-19 PROCEDURE — 700111 HCHG RX REV CODE 636 W/ 250 OVERRIDE (IP): Performed by: STUDENT IN AN ORGANIZED HEALTH CARE EDUCATION/TRAINING PROGRAM

## 2020-03-19 PROCEDURE — 70450 CT HEAD/BRAIN W/O DYE: CPT

## 2020-03-19 PROCEDURE — 700102 HCHG RX REV CODE 250 W/ 637 OVERRIDE(OP): Performed by: INTERNAL MEDICINE

## 2020-03-19 PROCEDURE — 700102 HCHG RX REV CODE 250 W/ 637 OVERRIDE(OP): Performed by: STUDENT IN AN ORGANIZED HEALTH CARE EDUCATION/TRAINING PROGRAM

## 2020-03-19 PROCEDURE — 36415 COLL VENOUS BLD VENIPUNCTURE: CPT

## 2020-03-19 PROCEDURE — A9270 NON-COVERED ITEM OR SERVICE: HCPCS | Performed by: STUDENT IN AN ORGANIZED HEALTH CARE EDUCATION/TRAINING PROGRAM

## 2020-03-19 PROCEDURE — A9270 NON-COVERED ITEM OR SERVICE: HCPCS | Performed by: PSYCHIATRY & NEUROLOGY

## 2020-03-19 PROCEDURE — G0378 HOSPITAL OBSERVATION PER HR: HCPCS

## 2020-03-19 PROCEDURE — 700101 HCHG RX REV CODE 250: Performed by: INTERNAL MEDICINE

## 2020-03-19 PROCEDURE — 85025 COMPLETE CBC W/AUTO DIFF WBC: CPT

## 2020-03-19 PROCEDURE — 700101 HCHG RX REV CODE 250: Performed by: STUDENT IN AN ORGANIZED HEALTH CARE EDUCATION/TRAINING PROGRAM

## 2020-03-19 PROCEDURE — A9270 NON-COVERED ITEM OR SERVICE: HCPCS | Performed by: INTERNAL MEDICINE

## 2020-03-19 PROCEDURE — 96372 THER/PROPH/DIAG INJ SC/IM: CPT

## 2020-03-19 PROCEDURE — 700111 HCHG RX REV CODE 636 W/ 250 OVERRIDE (IP): Performed by: INTERNAL MEDICINE

## 2020-03-19 PROCEDURE — 700102 HCHG RX REV CODE 250 W/ 637 OVERRIDE(OP): Performed by: PSYCHIATRY & NEUROLOGY

## 2020-03-19 PROCEDURE — 80053 COMPREHEN METABOLIC PANEL: CPT

## 2020-03-19 RX ADMIN — ENOXAPARIN SODIUM 40 MG: 100 INJECTION SUBCUTANEOUS at 05:19

## 2020-03-19 RX ADMIN — ACETAZOLAMIDE 1000 MG: 500 CAPSULE, EXTENDED RELEASE ORAL at 17:29

## 2020-03-19 RX ADMIN — ACETAZOLAMIDE 1000 MG: 500 CAPSULE, EXTENDED RELEASE ORAL at 05:09

## 2020-03-19 RX ADMIN — BUDESONIDE AND FORMOTEROL FUMARATE DIHYDRATE 2 PUFF: 160; 4.5 AEROSOL RESPIRATORY (INHALATION) at 05:18

## 2020-03-19 RX ADMIN — OXCARBAZEPINE 150 MG: 150 TABLET, FILM COATED ORAL at 05:09

## 2020-03-19 RX ADMIN — OXCARBAZEPINE 150 MG: 150 TABLET, FILM COATED ORAL at 17:29

## 2020-03-19 RX ADMIN — PREGABALIN 300 MG: 100 CAPSULE ORAL at 17:29

## 2020-03-19 RX ADMIN — ZIPRASIDONE HYDROCHLORIDE 40 MG: 40 CAPSULE ORAL at 05:09

## 2020-03-19 RX ADMIN — MODAFINIL 200 MG: 100 TABLET ORAL at 05:14

## 2020-03-19 RX ADMIN — OXYCODONE HYDROCHLORIDE AND ACETAMINOPHEN 1 TABLET: 5; 325 TABLET ORAL at 20:02

## 2020-03-19 RX ADMIN — LIDOCAINE 1 PATCH: 50 PATCH TOPICAL at 05:18

## 2020-03-19 RX ADMIN — ZIPRASIDONE HYDROCHLORIDE 40 MG: 40 CAPSULE ORAL at 17:28

## 2020-03-19 RX ADMIN — LIDOCAINE HYDROCHLORIDE 5 ML: 20 SOLUTION OROPHARYNGEAL at 17:28

## 2020-03-19 RX ADMIN — LIDOCAINE HYDROCHLORIDE 5 ML: 20 SOLUTION OROPHARYNGEAL at 06:30

## 2020-03-19 RX ADMIN — MYCOPHENOLATE MOFETIL 1000 MG: 250 CAPSULE ORAL at 06:30

## 2020-03-19 RX ADMIN — ONDANSETRON 4 MG: 4 TABLET, ORALLY DISINTEGRATING ORAL at 15:42

## 2020-03-19 RX ADMIN — ASPIRIN 81 MG: 81 TABLET, COATED ORAL at 05:13

## 2020-03-19 RX ADMIN — PREGABALIN 300 MG: 100 CAPSULE ORAL at 05:11

## 2020-03-19 RX ADMIN — OXYCODONE HYDROCHLORIDE AND ACETAMINOPHEN 1 TABLET: 5; 325 TABLET ORAL at 12:59

## 2020-03-19 RX ADMIN — BUSPIRONE HYDROCHLORIDE 10 MG: 10 TABLET ORAL at 17:29

## 2020-03-19 RX ADMIN — MYCOPHENOLATE MOFETIL 1000 MG: 250 CAPSULE ORAL at 17:28

## 2020-03-19 RX ADMIN — CLOTRIMAZOLE: 10 CREAM TOPICAL at 17:28

## 2020-03-19 RX ADMIN — TRAZODONE HYDROCHLORIDE 50 MG: 100 TABLET ORAL at 17:29

## 2020-03-19 RX ADMIN — LEVOTHYROXINE SODIUM 100 MCG: 100 TABLET ORAL at 05:11

## 2020-03-19 RX ADMIN — SENNOSIDES AND DOCUSATE SODIUM 2 TABLET: 8.6; 5 TABLET ORAL at 05:31

## 2020-03-19 RX ADMIN — SENNOSIDES AND DOCUSATE SODIUM 2 TABLET: 8.6; 5 TABLET ORAL at 17:29

## 2020-03-19 RX ADMIN — BUDESONIDE AND FORMOTEROL FUMARATE DIHYDRATE 2 PUFF: 160; 4.5 AEROSOL RESPIRATORY (INHALATION) at 17:28

## 2020-03-19 RX ADMIN — LIDOCAINE 1 PATCH: 50 PATCH TOPICAL at 12:59

## 2020-03-19 RX ADMIN — CLOTRIMAZOLE: 10 CREAM TOPICAL at 05:18

## 2020-03-19 RX ADMIN — FLUOXETINE HYDROCHLORIDE 40 MG: 20 CAPSULE ORAL at 05:14

## 2020-03-19 RX ADMIN — PREDNISONE 5 MG: 5 TABLET ORAL at 05:12

## 2020-03-19 RX ADMIN — BUSPIRONE HYDROCHLORIDE 10 MG: 10 TABLET ORAL at 05:14

## 2020-03-19 RX ADMIN — OXYCODONE HYDROCHLORIDE AND ACETAMINOPHEN 1 TABLET: 5; 325 TABLET ORAL at 02:41

## 2020-03-19 ASSESSMENT — ENCOUNTER SYMPTOMS
HALLUCINATIONS: 0
MYALGIAS: 1
NAUSEA: 0
BRUISES/BLEEDS EASILY: 0
BLURRED VISION: 0
DIZZINESS: 0
FEVER: 0
COUGH: 1
INSOMNIA: 0
DEPRESSION: 1

## 2020-03-19 ASSESSMENT — LIFESTYLE VARIABLES: SUBSTANCE_ABUSE: 0

## 2020-03-19 NOTE — PROGRESS NOTES
Daily Progress Note:     Date of Service: 3/19/2020  Primary Team: [facility] White Team   Attending: RUFUS Duggan   Senior Resident: Dr. Adams  Intern: Dr. Goodson  Contact:  [phone]    Chief Complaint:   Bilateral lower extremity weakness    Subjective  Afebrile. No acute overnight event.  -Patient report she is concerned she may be dehydrated and kidney is getting bad. She feels like her urine is darker than usual and she feels she is urinating less and would like IV fluid replacement. Additionally, she report burning sensation over left side of face, and whole left side of body, report constant pain. The onset of symptoms started just 5 minutes prior to interview. She reports she believe it may be her transverse myelitis acting up again.  - Discussed with patient there is no indication for IV fluid at this time, but will order laboratory studies given acute changes  - Transverse myelitis would not explain the distribution of symptoms patient is currently describing, CT head w/o was negative.   - Will continue to monitor symptoms of numbness/ burning sensation on left face and body   - Discharge planning    Consultants/Specialty:  Neurology  Psychology  Neurophthalmology    Review of Systems:    Constitutional: Negative for chills and fever.   Eyes:        Chronic visual changes   Mouth: aphthous ulcer  Respiratory: Normal breathing, Negative for cough and hemoptysis.    Cardiovascular: Negative for chest pain, palpitations and leg swelling.   Gastrointestinal: Negative for abdominal pain, nausea and vomiting.   Genitourinary:        Reports burning sensation   Musculoskeletal: Positive for back pain chronic.    Neurological: Positive for headaches.        Chronic wooshing, chronic bilateral lower extremity weakness    Objective Data:   Physical Exam:   Vitals:   Temp:  [36.1 °C (97 °F)-37.1 °C (98.7 °F)] 37.1 °C (98.7 °F)  Pulse:  [65-71] 71  Resp:  [16-18] 18  BP: ()/(45-60) 104/48  SpO2:  [94 %-99 %]  98 %     Constitutional:       General: She is not in acute distress.     Appearance: Normal appearance. She is obese.   HENT:      Head: Normocephalic and atraumatic. Mouth, erythema in tongue and roof of mouth without white plaque  Eyes:      Extraocular Movements: Extraocular movements intact.      Pupils: Pupils are equal, round, and reactive to light.   Neck:      Musculoskeletal: Normal range of motion and neck supple.   Cardiovascular:      Rate and Rhythm: Normal rate and regular rhythm.      Heart sounds: Normal heart sounds.   Pulmonary:      Effort: Pulmonary effort is normal. No respiratory distress.      Breath sounds: Normal breath sounds. No expiratory wheezing.   Abdominal:      General: Bowel sounds are normal. There is no distension.      Palpations: Abdomen is soft.   Musculoskeletal:         General: mild tenderness present. No swelling or deformity. Musculoskeletal exam left UE, 2-3/5, bilateral chronic weakness    Genital exam: (Performed 03/15/20 with her nurse as chaperone, no ulcers, skin irritation and redness mostly in the skin folds, she is morbidly obese.)     Back: examined, no erythema, no sores  Skin:     General: Skin is warm and dry. Abrasion on right top foot    Neurological:      Mental Status: She is alert and oriented to person, place, and time. Mental status is at baseline.      Labs:       Recent Labs     03/19/20  0900   WBC 5.8   RBC 4.05*   HEMOGLOBIN 12.4   HEMATOCRIT 38.3   MCV 94.6   MCH 30.6   RDW 47.6   PLATELETCT 144*   MPV 12.0   NEUTSPOLYS 78.40*   LYMPHOCYTES 13.90*   MONOCYTES 5.70   EOSINOPHILS 1.40   BASOPHILS 0.30     Recent Labs     03/19/20  0900   SODIUM 138   POTASSIUM 3.4*   CHLORIDE 112   CO2 15*   GLUCOSE 85   BUN 9       Imaging:   CT head  FINDINGS:  There is no evidence of mass or mass effect.  The ventricles and CSF spaces are normal.  There is no periventricular chronic small vessel ischemic change present.     There is no intracranial hemorrhage  seen.  The calvarium is intact.  No skull base abnormality is noted.  There is no scalp injury.        IMPRESSION:     No evidence of acute intracranial process.    * Leg weakness, bilateral  Assessment & Plan  -Patient caregiver states she is unable to provide safe transfers between chair and bed  -PT states there is no more equipment they can provide to assist with it  -It is likely that patient will require long term care facility placement  -CT spine ruled out any acute pathology   -Patient unable to undergo MRI because of Stimulator in place.     Neurology consult, appreciate recommendations  Neuropthalmology recommendation: continue med :   1. Diamox, 1000 mg p.o. b.i.d.  2. Maintain the prednisone 5 mg p.o. daily.  3. Maintain the CellCept 1000 mg p.o. b.i.d.  4. Follow up in neurophthalmology clinic at discharge  Psychiatric consult, continue psychiatric medication  Q4 neuro checks.   Pain control for LBP, right knee pain  Pending placement      Numbness  Assessment & Plan  -Numbness in left side of face and left side of body started 03/19/20 AM  -CT head negative for acute intracranial abnormality, ventricle size normal, no intracranial hemorrhage  -Onset of her symptoms occurred together, facial numbness and left body hemiparesthesia. The distribution of her symptoms cannot be explained with one organic neurological cause. She does have transverse myelitis, but that would not explain her facial numbness.   Plan  - Will continue to monitor    Glossitis  Assessment & Plan  - Erythema on tongue and roof of mouth on examination without white plaque  - Glossitis, unknown etiology, ddx iatrogenic, nutitional  - albuterol induced glossitis vs symbicor induced thrush vs seebri induced glossitis  - B12 300s 02/2020, folate WNL ( 2019) , ferritin 100s (2018)  Plan  - BMX for pain.    - Encourage oral hydration, rinse mouth after symbicort use  - discontinue seebri  -Discussed with pharmacy, she is on multiple  medications that can affect her immunity for can cause rash, will keep monitoring.    Intracranial pressure increased  Assessment & Plan  -Clinically stable.   -Headache with whooshing sounds  -Patient was previously on Lasix started at dose of 20mg in 2017 for bilateral lower extremity edema, and has been on lasix for several years. Her lasix 80mg daily was discontinued upon discharge from hospital 02/25/2020.   -Patient was started on Diamox / filled diamox 02/27/20 outpatient , dose increased while patient was inpatient during this hospitalization to 1000mg BID per neurology    Plan :  Resumed 1000mg Diamox         TONYA (obstructive sleep apnea)- (present on admission)  Assessment & Plan  Not on CPAP at home. Patient has not followed up with doctor yet for the same.   Follow up outpatient.     UTI (urinary tract infection)  Assessment & Plan  History of dysuria,  UA: Cloudy urine, nitrite positive, leukocyte esterase small, WBC 20-50, bacteria many  Urine culture: Lactose fermenting Gram negative zita   >100,000 cfu/mL -Klebsiella pneumoniae sensitive to bactrim.  Got Bactrim for 3 days  - UA 03/11/20 with trace LE, negative nitrite and mod epithelial cell, low WBC, likely skin letty contaminate. Will wait for rflx cx    DM (diabetes mellitus) (HCC)- (present on admission)  Assessment & Plan  A1C 6.0 (08/2019)  Continue home meds.   Dulaglutide injection every Friday .   Diabetic diet.    Asthma- (present on admission)  Assessment & Plan  Continue home meds  Start montelukast and glycopyrronium bromide as per RT rec  Saturating well on room air.     Hypothyroidism- (present on admission)  Assessment & Plan  TSH 5.65  Increased Levothyroxine to 100mcg on 3/3  Continue levothyroxine, Patient will need to followed up outpatient in 1-2 month (05/2020)      Joint pain  Assessment & Plan  Right knee pain significantly improved.   Patient report slipped off bed while in sitting position and landed on her knees and right  elbow  She has significant pain on right knee upon palpation or movement  Given mechanism of injury will consider conservative management with rest, ice and lidocaine patch for right knee  Xray of R knee without bony abnormality or joint effusion  PLAN  Continue Ibuprofen 400mg Q6H Prn ordered for 3 days for acute episode of pain in right knee  Conservative management

## 2020-03-19 NOTE — DISCHARGE PLANNING
Anticipated Disposition:  SNF then Long term Care    Action:   This CM received a call from Grandmother, we had a long conversation, she is worried about the virus, and think its dangerous for Kristin to be here in the hospital. This CM informed her, placement takes time, she asked this CM to send blanket referral to SNFs to hope one of the facilities will accept the Pt.   [facility] referral both [facility] and [facility], signed and faxed to [facility]          Barriers to Discharge:   SNF acceptance        Plan:  Please follow up with [facility] for acceptance.       PASRR#[id number]  LOC#[id number]

## 2020-03-19 NOTE — DISCHARGE PLANNING
@1405  Agency/Facility Name: [facility]  Outcome: Left message, awaiting call back.    Agency/Facility Name: [facility]  Spoke To: Kathrin  Outcome: Pending review and getting full on beds.    Agency/Facility Name: [facility]  Outcome: Left message, awaiting call back.    @1340  Agency/Facility Name: [facility]  Spoke To: Trini  Outcome: Declined due to no skilled need.    Agency/Facility Name: [facility]  Spoke To: Lm  Outcome: Declined due to no skilled need.    @1100  Agency/Facility Name: [facility]  Spoke To: Tami  Outcome: Declined due to no skilled need.    @1020  Agency/Facility Name: [facility]  Spoke To: Tami  Outcome: Declined due to multiple psych history issues and no appropriate room to put her.    Received Choice form at 1000  Agency[facility]Facility Name: [facility]  Referral sent per Choice form @ 1000

## 2020-03-19 NOTE — CARE PLAN
Patient educated on safety precautions.  Bed locked and in low position, call light within reach, hourly rounding in place.

## 2020-03-19 NOTE — PROGRESS NOTES
2 RN skin check performed with: SONYA Blackwood    Sacrum/ perinuem pink and blanching. Received from day shift RN, blistering to buttocks. This RN inspected the area and skin appears pink but intact. B/l heels dry/flaky but intact. R anterior foot wounds, minimal drainage, covered with mepilex lite. Dressing CDI. ABD scattered scabbing present, CD. R ABD bruising present. Elbows, occiput, ears, and shoulder blades CDIB. Device in place foot drop boots to be worn at pt discretion, skin underneath intact, boots not currently in use. Pt turns self side to side with Q2 hr turn prompting as needed, waffle overlay, seat cushion on chair when OOB, mepilex to b/l heels in place to reduce skin breakdown. All appropriate LDA's, wound photos, and wound placed/in place. Education provided on activity and mobility encouraged.

## 2020-03-19 NOTE — CARE PLAN
Problem: Pain Management  Goal: Pain level will decrease to patient's comfort goal  Outcome: PROGRESSING AS EXPECTED  Pt educated on pharm and non-pharm measures for treating pain. Pt verbalizes understanding.     Problem: Skin Integrity  Goal: Risk for impaired skin integrity will decrease  Outcome: PROGRESSING AS EXPECTED  Pt educated on how lack of mobility affects skin integrity. Q2H turns in place. Waffle mattress utilized.

## 2020-03-19 NOTE — ASSESSMENT & PLAN NOTE
-Numbness in left side of face and left side of body started 03/19/20 AM, progressed to right sided numbness and weakness 03/20/20 AM  -CT head negative for acute intracranial abnormality, ventricle size normal, no intracranial hemorrhage  -Onset of her symptoms occurred together, facial numbness and left body hemiparesthesia. The distribution of her symptoms cannot be explained with one organic neurological cause. She does have functional transverse myelitis, but that would not explain her facial numbness.   - History of negative NMO spectrum disorder workup  - MRI cannot be done due to Interstim device for incontinence    3/23  -Improving/ Resolving with IV solumedrol   -Continue IV solumedrol 1g x 4 days - 03/21/20-03/24/20  -Prednisone taper starting 03/25/20  -Neuro check q4H

## 2020-03-20 LAB — GLUCOSE BLD-MCNC: 90 MG/DL (ref 65–99)

## 2020-03-20 PROCEDURE — 99214 OFFICE O/P EST MOD 30 MIN: CPT | Mod: GC | Performed by: PSYCHIATRY & NEUROLOGY

## 2020-03-20 PROCEDURE — 700101 HCHG RX REV CODE 250: Performed by: INTERNAL MEDICINE

## 2020-03-20 PROCEDURE — 82962 GLUCOSE BLOOD TEST: CPT

## 2020-03-20 PROCEDURE — 99214 OFFICE O/P EST MOD 30 MIN: CPT | Performed by: PSYCHIATRY & NEUROLOGY

## 2020-03-20 PROCEDURE — 700102 HCHG RX REV CODE 250 W/ 637 OVERRIDE(OP): Performed by: STUDENT IN AN ORGANIZED HEALTH CARE EDUCATION/TRAINING PROGRAM

## 2020-03-20 PROCEDURE — 96372 THER/PROPH/DIAG INJ SC/IM: CPT

## 2020-03-20 PROCEDURE — 700111 HCHG RX REV CODE 636 W/ 250 OVERRIDE (IP): Performed by: STUDENT IN AN ORGANIZED HEALTH CARE EDUCATION/TRAINING PROGRAM

## 2020-03-20 PROCEDURE — A9270 NON-COVERED ITEM OR SERVICE: HCPCS | Performed by: INTERNAL MEDICINE

## 2020-03-20 PROCEDURE — 700111 HCHG RX REV CODE 636 W/ 250 OVERRIDE (IP): Performed by: INTERNAL MEDICINE

## 2020-03-20 PROCEDURE — A9270 NON-COVERED ITEM OR SERVICE: HCPCS | Performed by: STUDENT IN AN ORGANIZED HEALTH CARE EDUCATION/TRAINING PROGRAM

## 2020-03-20 PROCEDURE — G0378 HOSPITAL OBSERVATION PER HR: HCPCS

## 2020-03-20 PROCEDURE — 99225 PR SUBSEQUENT OBSERVATION CARE,LEVEL II: CPT | Mod: GC | Performed by: INTERNAL MEDICINE

## 2020-03-20 PROCEDURE — 700102 HCHG RX REV CODE 250 W/ 637 OVERRIDE(OP): Performed by: INTERNAL MEDICINE

## 2020-03-20 PROCEDURE — 90833 PSYTX W PT W E/M 30 MIN: CPT | Performed by: PSYCHIATRY & NEUROLOGY

## 2020-03-20 PROCEDURE — 700102 HCHG RX REV CODE 250 W/ 637 OVERRIDE(OP): Performed by: PSYCHIATRY & NEUROLOGY

## 2020-03-20 PROCEDURE — A9270 NON-COVERED ITEM OR SERVICE: HCPCS | Performed by: PSYCHIATRY & NEUROLOGY

## 2020-03-20 RX ADMIN — ACETAMINOPHEN 650 MG: 325 TABLET, FILM COATED ORAL at 08:13

## 2020-03-20 RX ADMIN — CLOTRIMAZOLE: 10 CREAM TOPICAL at 04:53

## 2020-03-20 RX ADMIN — BUSPIRONE HYDROCHLORIDE 10 MG: 10 TABLET ORAL at 04:45

## 2020-03-20 RX ADMIN — ZIPRASIDONE HYDROCHLORIDE 40 MG: 40 CAPSULE ORAL at 04:40

## 2020-03-20 RX ADMIN — MODAFINIL 200 MG: 100 TABLET ORAL at 04:46

## 2020-03-20 RX ADMIN — BUDESONIDE AND FORMOTEROL FUMARATE DIHYDRATE 2 PUFF: 160; 4.5 AEROSOL RESPIRATORY (INHALATION) at 20:33

## 2020-03-20 RX ADMIN — OXCARBAZEPINE 150 MG: 150 TABLET, FILM COATED ORAL at 18:10

## 2020-03-20 RX ADMIN — PREGABALIN 300 MG: 100 CAPSULE ORAL at 18:10

## 2020-03-20 RX ADMIN — ENOXAPARIN SODIUM 40 MG: 100 INJECTION SUBCUTANEOUS at 04:53

## 2020-03-20 RX ADMIN — MONTELUKAST 10 MG: 10 TABLET, FILM COATED ORAL at 20:28

## 2020-03-20 RX ADMIN — OXCARBAZEPINE 150 MG: 150 TABLET, FILM COATED ORAL at 04:45

## 2020-03-20 RX ADMIN — ACETAZOLAMIDE 1000 MG: 500 CAPSULE, EXTENDED RELEASE ORAL at 04:43

## 2020-03-20 RX ADMIN — OXYCODONE HYDROCHLORIDE AND ACETAMINOPHEN 1 TABLET: 5; 325 TABLET ORAL at 20:28

## 2020-03-20 RX ADMIN — FLUOXETINE HYDROCHLORIDE 40 MG: 20 CAPSULE ORAL at 04:46

## 2020-03-20 RX ADMIN — ASPIRIN 81 MG: 81 TABLET, COATED ORAL at 04:40

## 2020-03-20 RX ADMIN — LIDOCAINE HYDROCHLORIDE 5 ML: 20 SOLUTION OROPHARYNGEAL at 09:12

## 2020-03-20 RX ADMIN — PREDNISONE 5 MG: 5 TABLET ORAL at 04:46

## 2020-03-20 RX ADMIN — CLOTRIMAZOLE: 10 CREAM TOPICAL at 18:11

## 2020-03-20 RX ADMIN — OXYCODONE HYDROCHLORIDE AND ACETAMINOPHEN 1 TABLET: 5; 325 TABLET ORAL at 04:40

## 2020-03-20 RX ADMIN — LIDOCAINE HYDROCHLORIDE 5 ML: 20 SOLUTION OROPHARYNGEAL at 13:18

## 2020-03-20 RX ADMIN — BUSPIRONE HYDROCHLORIDE 10 MG: 10 TABLET ORAL at 18:11

## 2020-03-20 RX ADMIN — LEVOTHYROXINE SODIUM 100 MCG: 100 TABLET ORAL at 04:44

## 2020-03-20 RX ADMIN — SENNOSIDES AND DOCUSATE SODIUM 2 TABLET: 8.6; 5 TABLET ORAL at 18:11

## 2020-03-20 RX ADMIN — ZIPRASIDONE HYDROCHLORIDE 40 MG: 40 CAPSULE ORAL at 18:10

## 2020-03-20 RX ADMIN — PREGABALIN 300 MG: 100 CAPSULE ORAL at 04:42

## 2020-03-20 RX ADMIN — SENNOSIDES AND DOCUSATE SODIUM 2 TABLET: 8.6; 5 TABLET ORAL at 04:41

## 2020-03-20 RX ADMIN — BUDESONIDE AND FORMOTEROL FUMARATE DIHYDRATE 2 PUFF: 160; 4.5 AEROSOL RESPIRATORY (INHALATION) at 04:52

## 2020-03-20 RX ADMIN — MYCOPHENOLATE MOFETIL 1000 MG: 250 CAPSULE ORAL at 18:13

## 2020-03-20 RX ADMIN — LIDOCAINE 1 PATCH: 50 PATCH TOPICAL at 04:53

## 2020-03-20 RX ADMIN — MYCOPHENOLATE MOFETIL 1000 MG: 250 CAPSULE ORAL at 04:49

## 2020-03-20 RX ADMIN — TRAZODONE HYDROCHLORIDE 50 MG: 100 TABLET ORAL at 18:11

## 2020-03-20 RX ADMIN — OXYCODONE HYDROCHLORIDE AND ACETAMINOPHEN 1 TABLET: 5; 325 TABLET ORAL at 09:12

## 2020-03-20 RX ADMIN — ACETAZOLAMIDE 1000 MG: 500 CAPSULE, EXTENDED RELEASE ORAL at 18:10

## 2020-03-20 NOTE — PSYCHIATRY
"PSYCHIATRIC FOLLOW-UP:(established)  *Reason for admission:  Ground level fall.  *Legal Hold Status on Admission:   No legal hold.  Supervising Psychiatrist:          Dr. Sabillon     *HPI:          Patient is a 29 year old female with history of schizoaffective disorder, depression, EVELIA, and borderline personality disorder who presented to the ED after a ground level fall.   Patient reports continued depression today.  She said she has been sleeping better since starting modafinil.  She said it helps her stay awake more during the day.  She said she sat up for 4 hours in her wheelchair yesterday.  However this morning she developed pain in the right side of her neck, left facial weakness, left body weakness, and left-sided hyperesthesia.  She worries that her transverse myelitis is \"acting up again.\"  She continues to be medication adherent and tolerating her medications well.  Continues to deny suicidal or homicidal ideation.  Continues to deny auditory and visual hallucinations.  She agreed to continue to exercise and use a wheelchair as tolerated.     Medical ROS (as pertinent):                    Review of Systems   Constitutional: Negative for fever.   HENT: Positive for congestion.    Eyes: Negative for blurred vision.   Respiratory: Positive for cough.    Cardiovascular: Negative for chest pain.   Gastrointestinal: Negative for nausea.   Genitourinary: Negative for dysuria.   Musculoskeletal: Positive for myalgias.   Skin: Negative for rash.   Neurological: Negative for dizziness.   Endo/Heme/Allergies: Does not bruise/bleed easily.   Psychiatric/Behavioral: Positive for depression. Negative for hallucinations, substance abuse and suicidal ideas. The patient does not have insomnia.       Psychiatric Examination:  Vitals:    03/19/20 1600   BP: 115/51   Pulse: 75   Resp: 17   Temp: 36.4 °C (97.5 °F)   SpO2: 96%     General Appearance:  Patient appears stated age, fairly kempt, fair hygiene, lying in a " "hospital bed.   Abnormal Movements:  No tremor or dystonia noted.  Gait and Posture:  Could not assess.  Speech:  Normal tone, volume, and rate. Spontaneous and on pressured.  Thought processes: Linear and goal directed.  Associations: No loose or clang associations.   Abnormal or Psychotic Thoughts: Denies AH, VH, paranoia, and delusions. Does not appear to be responding to internal stimuli.  Judgement and Insight: Poor / Poor   Orientation:  AAOx3  Recent and Remote Memory:  Grossly intact.  Attention Span and Concentration: Grossly intact.  Language:  Fluent in English  Fund of Knowledge: Adequate.   Mood and Affect: \"Depressed.\"   SI/HI: Denies SI, Denies HI.      *ASSESSMENT:  Patient is a 29 year old female with history of schizoaffective disorder, depression, EVELIA, and borderline personality disorder who presented to the ED after a ground level fall.  Patient continues to be significantly depressed.  She holds a persistent belief that engaging in physical therapy is futile due to her weakness.  She seems somewhat amenable to attempting to engage in getting stronger and working towards wellness. Patient needs significant positive reinforcement for any attempts at increasing activity. She should be encouraged to spend as much time as possible in her wheelchair and as active as she can be during the day.  Patient seems to have responded to modafinil well with less daytime somnolence and improved sleep at night.  She denies side effects. She does not represent a significant risk of harm to self and does not meet criteria for legal hold at this time.      Dx:   -Functional Neurological Disorder With weakness   -Schizoaffective disorder  -Generalized anxiety  -Borderline personality disorder     Medical:   -History of transverse myelitis  -Chronic pain syndrome  -GERD  -Diabetes mellitus type 2  -Obstructive sleep apnea  -Hypothyroidism, on Synthroid  -Increased intracranial pressure     PLAN:  -Patient does not " meet criteria for legal hold.  -Continue modafinil 200mg PO qam for somnolence.  -Continue BuSpar 10 mg twice daily for anxiety  -Continue fluoxetine 40 mg daily for mood.  -Continue Trileptal 150 mg twice daily for mood  -Continue trazodone 50 mg nightly for insomnia  -Recommend considering increase in Levothyroxine.  -Continue Geodon 40 mg twice daily for psychosis  - Reviewed risks, benefits, alternatives to include no treatment, therapy and medications  - Will continue to follow.     Thank you for the Consult.

## 2020-03-20 NOTE — DISCHARGE PLANNING
Agency/Facility Name: [facility]  Spoke To: Admissions  Outcome: Reviewing referral.    Agency/Facility Name: [facility]  Spoke To: Admissions  Outcome: No beds, will hold the referral and follow up when beds open.    Agency/Facility Name: [facility]  Spoke To: Admissions  Outcome: No beds, will update when they do.

## 2020-03-20 NOTE — PROGRESS NOTES
Daily Progress Note:     Date of Service: 3/20/2020  Primary Team: [facility] IM White Team   Attending: ANDERSON Cagle   Senior Resident: Dr. Adams  Intern: Dr. Goodson  Contact:  [phone]    Chief Complaint:   Bilateral lower extremity weakness    Subjective:    No acute overnight event. Afebrile.   Patient continues to reports left upper extremity weakness, and left hemiparesthesia. No fever, chills, nausea, vomiting, shortness of breaht, chest pain, change in headache. Patient previously had multiple episodes in the past and also previously seen at [facility], neuromuscular clinic.   - Per chart review, muscle biopsy work up was negative 2017. Patient cannot have MRI due to InterStim. Previous episodes of paralysis / LMN etiology showed some benefit with IV solumedrol.   Around 1100 03/20/20 patient begin reporting right sided numbness and right sided upper extremity weakness.     - Awaiting Neurology consult  - MRI contraindicated because of Interstim   - CT head w/o normal  - may consider IV solumedrol  - had negative NMO and anti MOG antibodies in the past      Consultants/Specialty:  Neuropthalmology  Neurology  Psychiatry  Review of Systems:    Constitutional: Negative for chills and fever.   Eyes:        Chronic visual changes   Mouth: aphthous ulcer  Respiratory: Normal breathing, Negative for cough and hemoptysis.    Cardiovascular: Negative for chest pain, palpitations and leg swelling.   Gastrointestinal: Negative for abdominal pain, nausea and vomiting.   Genitourinary:        Reports burning sensation   Musculoskeletal: Positive for back pain chronic. Left upper extremity weakness    Neurological: Positive for headaches. hemiparesthesia       Chronic wooshing, chronic bilateral lower extremity weakness  Objective Data:   Physical Exam:   Vitals:   Temp:  [36.1 °C (96.9 °F)-36.6 °C (97.9 °F)] 36.1 °C (96.9 °F)  Pulse:  [67-79] 73  Resp:  [16-17] 16  BP:  (105-138)/(51-67) 119/62  SpO2:  [93 %-96 %] 96 %     Constitutional:       General: She is not in acute distress.     Appearance: Normal appearance. She is obese.   HENT:      Head: Normocephalic and atraumatic. Mouth, erythema in tongue and roof of mouth without white plaque  Eyes:      Extraocular Movements: Extraocular movements intact.      Pupils: Pupils are equal, round, and reactive to light.   Neck:      Musculoskeletal: Normal range of motion and neck supple.   Cardiovascular:      Rate and Rhythm: Normal rate and regular rhythm.      Heart sounds: Normal heart sounds.   Pulmonary:      Effort: Pulmonary effort is normal. No respiratory distress.      Breath sounds: Normal breath sounds. No expiratory wheezing.   Abdominal:      General: Bowel sounds are normal. There is no distension.      Palpations: Abdomen is soft.   Musculoskeletal:         General: No swelling or deformity.     Genital exam: (Performed 03/15/20 with her nurse as chaperone, no ulcers, skin irritation and redness mostly in the skin folds, she is morbidly obese.)     Back: examined, no erythema, no sores  Skin:     General: Skin is warm and dry. Abrasion on right top foot    Neurological:      Mental Status: She is alert and oriented to person, place, and time. Mental status is at baseline.    LUE: 2/5 bicep, 2/5 , did not appreciate bicep tendon reflex  RUE 1/5 bicep, 1/5 , bicep tendon reflex appreciated  LLE did not appreciate patellar reflex, chronic weakness   RLE; patellar reflex, chronic weakness  Cranial nerve II-XII intact    Labs:   Recent Labs     03/19/20  0900   WBC 5.8   RBC 4.05*   HEMOGLOBIN 12.4   HEMATOCRIT 38.3   MCV 94.6   MCH 30.6   RDW 47.6   PLATELETCT 144*   MPV 12.0   NEUTSPOLYS 78.40*   LYMPHOCYTES 13.90*   MONOCYTES 5.70   EOSINOPHILS 1.40   BASOPHILS 0.30     Recent Labs     03/19/20  0900   SODIUM 138   POTASSIUM 3.4*   CHLORIDE 112   CO2 15*   GLUCOSE 85   BUN 9     Recent Labs     03/19/20  0900    ALBUMIN 4.1   TBILIRUBIN 0.3   ALKPHOSPHAT 55   TOTPROTEIN 6.0   ALTSGPT 15   ASTSGOT 8*   CREATININE 0.71       Imaging:   No new imaging    * Leg weakness, bilateral  Assessment & Plan  -Patient caregiver states she is unable to provide safe transfers between chair and bed  -PT states there is no more equipment they can provide to assist with it  -It is likely that patient will require long term care facility placement  -CT spine ruled out any acute pathology   -Patient unable to undergo MRI because of Stimulator in place.     Neurology consult, appreciate recommendations  Neuropthalmology recommendation: continue med :   1. Diamox, 1000 mg p.o. b.i.d.  2. Maintain the prednisone 5 mg p.o. daily.  3. Maintain the CellCept 1000 mg p.o. b.i.d.  4. Follow up in neurophthalmology clinic at discharge  Psychiatric consult, continue psychiatric medication  Q4 neuro checks.   Pain control for LBP, right knee pain  Pending placement      Numbness  Assessment & Plan  -Numbness in left side of face and left side of body started 03/19/20 AM  -CT head negative for acute intracranial abnormality, ventricle size normal, no intracranial hemorrhage  -Onset of her symptoms occurred together, facial numbness and left body hemiparesthesia. The distribution of her symptoms cannot be explained with one organic neurological cause. She does have transverse myelitis, but that would not explain her facial numbness.   Plan  - Will continue to monitor    Glossitis  Assessment & Plan  - Erythema on tongue and roof of mouth on examination without white plaque  - Glossitis, unknown etiology, ddx iatrogenic, nutitional  - albuterol induced glossitis vs symbicor induced thrush vs seebri induced glossitis  - B12 300s 02/2020, folate WNL ( 2019) , ferritin 100s (2018)  Plan  - BMX for pain.    - Encourage oral hydration, rinse mouth after symbicort use  - discontinue seebri  -Discussed with pharmacy, she is on multiple medications that can affect  her immunity for can cause rash, will keep monitoring.    Intracranial pressure increased  Assessment & Plan  -Clinically stable.   -Headache with whooshing sounds  -Patient was previously on Lasix started at dose of 20mg in 2017 for bilateral lower extremity edema, and has been on lasix for several years. Her lasix 80mg daily was discontinued upon discharge from hospital 02/25/2020.   -Patient was started on Diamox / filled diamox 02/27/20 outpatient , dose increased while patient was inpatient during this hospitalization to 1000mg BID per neurology    Plan :  Resumed 1000mg Diamox         TONYA (obstructive sleep apnea)- (present on admission)  Assessment & Plan  Not on CPAP at home. Patient has not followed up with doctor yet for the same.   Follow up outpatient.     UTI (urinary tract infection)  Assessment & Plan  History of dysuria,  UA: Cloudy urine, nitrite positive, leukocyte esterase small, WBC 20-50, bacteria many  Urine culture: Lactose fermenting Gram negative zita   >100,000 cfu/mL -Klebsiella pneumoniae sensitive to bactrim.  Got Bactrim for 3 days  - UA 03/11/20 with trace LE, negative nitrite and mod epithelial cell, low WBC, likely skin letty contaminate. Will wait for rflx cx    DM (diabetes mellitus) (HCC)- (present on admission)  Assessment & Plan  A1C 6.0 (08/2019)  Continue home meds.   Dulaglutide injection every Friday .   Diabetic diet.    Asthma- (present on admission)  Assessment & Plan  Continue home meds  Start montelukast and glycopyrronium bromide as per RT rec  Saturating well on room air.     Hypothyroidism- (present on admission)  Assessment & Plan  TSH 5.65  Increased Levothyroxine to 100mcg on 3/3  Continue levothyroxine, Patient will need to followed up outpatient in 1-2 month (05/2020)      Joint pain  Assessment & Plan  Right knee pain significantly improved.   Patient report slipped off bed while in sitting position and landed on her knees and right elbow  She has significant  pain on right knee upon palpation or movement  Given mechanism of injury will consider conservative management with rest, ice and lidocaine patch for right knee  Xray of R knee without bony abnormality or joint effusion  PLAN  Continue Ibuprofen 400mg Q6H Prn ordered for 3 days for acute episode of pain in right knee  Conservative management

## 2020-03-20 NOTE — CONSULTS
Neurology Initial Consult H&P  Neurohospitalist Service, [facility]    Referring Physician: DANIE Cagle*    Chief Complaint   Patient presents with   • Back Pain   • Ankle Pain     bilateral ankle pain after fall       HPI: On review of her records she was started on  Diamox recently by neurology 500 mg  twice daily her opening pressure reported was 28 mm of water with 30 mL CSF removed CSF results were negative by the notes of 2/25/2020.    From old notes her NMO and anti MOG anti bodies were negative in the past  From first neurology consult dated 12/01/2019 by Dr. Reinier Chambers.  29-year-old female who suffered a fall from standing yesterday.  Unclear she had loss of consciousness but she does have severe memory loss from the event.  CT head yesterday in the ER was unremarkable.  Today she continues to have throbbing sharp-like pain bilaterally.  She has vision changes seen stars at times.  She also complains of vertigo-like sensation when she moves too much.  She has a history of migraine headaches in the past but has not had one in many years recently.  No new focal numbness or weakness.  She has a very, he denies any history.  One is chronic relapsing bilateral optic neuritis in which he sees neuropathy ophthalmology for she is currently on CellCept and prednisone for this.  She had multiple admissions for IV Solu-Medrol for flareups last one being in May 2019.  She had a negative NMO and anti MOG antibodies in the past.  She states that her vision changes today are not like her other optic neuritis episodes in the past.  She cannot have an MRI brain due to a spinal stimulator placed in the lumbar spine.  Patient also suffers significant amount of psychiatry disorders including borderline and depression she is on multiple medications for this.  She also has chronic pain disorder she is on Lyrica and Neurontin for this also.      Discharge Summary by Indra BECKMAN  RUFUS Abreu2/25/2020  Attested  Patient's pain improved and her symptoms were resolved and she was discharged in stable condition on 2/25/2020. Case was discussed with on-call neurologist who will schedule her for outpatient appointment. Her home Diamox was increased. He is encouraged to follow-up with her PCP in 1 to 2 weeks.  In the ER, patient was afebrile, normotensive. Labs were overall normal including a white blood cell count, normal electrolytes, negative troponin, normal ESR and CRP. Patient was given pain medication with minimal improvement of her headache and subsequently admitted for further monitoring. The ER physician did consult neurology who recommended IV steroids and consideration of therapeutic lumbar puncture. Lumbar puncture was performed on 2/24/2020 with an opening pressure about 28 mm of water and removal of about 30 mL of CSF. CSF was negative for infection with an almost absent white blood cell count.        Review of systems: In addition to what is detailed in the HPI above, (and scanned into the chart if and when applicable), all other systems reviewed and are negative.  Currently reporting left facial burning inability to move her arms and legs more since yesterday numbness left more than right.  Chronic incontinence to urine.  Past Medical History:    has a past medical history of Abdominal pain, Anginal syndrome, Apnea, sleep, Arrhythmia, Arthritis, ASTHMA, Atrial fibrillation ([facility]), Back pain, Borderline personality disorder ([facility]), Breath shortness, Bronchitis, Cardiac arrhythmia, Chickenpox, Chronic UTI (9/18/2010), Cough, Daytime sleepiness, Depression, Diabetes (HCC), Diarrhea, Disorder of thyroid, Fall, Fatigue, Frequent headaches, Gasping for breath, Gynecological disorder, Headache(784.0), Heart burn, History of falling, Hypertension, Indigestion, Migraine, 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HCC), 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HCC), Nausea, Obesity, Pain (08-15-12), Painful joint, PCOS  "(polycystic ovarian syndrome), Pneumonia (2012), Psychosis (HCC), Renal disorder, Ringing in ears, Scoliosis, Shortness of breath, Sinus tachycardia (10/31/2013), Sleep apnea, Snoring, Tonsillitis, Tuberculosis, Urinary bladder disorder, Urinary incontinence, Weakness, and Wears glasses.    FHx:  family history includes Genitourinary () Problems in her sister; Heart Disease in her maternal grandmother and mother; Hypertension in her maternal grandmother, maternal uncle, and mother; No Known Problems in her sister; Other in her mother and sister; Sleep Apnea in her mother.    SHx:   reports that she has never smoked. She has never used smokeless tobacco. She reports previous drug use. Frequency: 7.00 times per week. Drugs: Marijuana and Oral. She reports that she does not drink alcohol.    Allergies:  Allergies   Allergen Reactions   • Cefdinir Shortness of Breath and Itching     Tolerated 1/18/17  Tolerates ceftriaxone  Tolerated augmentin 8/2019    • Depakote [Divalproex Sodium] Unspecified     Muscle spasms/muscle pain and weakness     • Doxycycline Anaphylaxis and Vomiting     RXN=unknown   • Amitriptyline Unspecified     Headaches     • Aripiprazole [Abilify] Unspecified     Headaches/muscle twitching     • Clindamycin Nausea     Even with food     • Flagyl [Metronidazole Hcl] Unspecified     \"eye problems\"     • Flomax [Tamsulosin Hydrochloride] Swelling   • Levaquin Unspecified     Severe muscle cramps in legs  RXN=unknown   • Metformin Unspecified     Increased lactic acid      • Tape Rash     Tears skin off  coban with Tegaderm tape ok intermittently  RXN=ongoing   • Vancomycin Itching     Pt becomes flushed in face and chest.   RXN=7/10/16   • Wound Dressing Adhesive Hives     By pt report   • Ciprofloxacin    • Depakote  [Divalproex Sodium]    • Hydromorphone Hcl    • Kdc:Metronidazole+Tartrazine    • Keflex Rash     Pt states she gets a rash with this medication  Tolerates ceftriaxone   • Levofloxacin " Unspecified     Leg muscle cramps   • Penicillins        Medications:    Current Facility-Administered Medications:   •  Maalox Plus - Diphenhydramine - Lidocaine (MBX Oral Solution), 5 mL, Oral, TID AC, Fly Goodson M.D., 5 mL at 03/20/20 1318  •  albuterol (PROVENTIL) 2.5mg/0.5ml nebulizer solution 2.5 mg, 2.5 mg, Nebulization, Q2HRS PRN (RT), Spencer Rios M.D.  •  modafinil (PROVIGIL) tablet 200 mg, 200 mg, Oral, QAM, Ozzy Brady M.D., 200 mg at 03/20/20 0446  •  clotrimazole (LOTRIMIN) 1 % cream, , Topical, BID, Renetta Horton M.D.  •  montelukast (SINGULAIR) tablet 10 mg, 10 mg, Oral, Nightly, Fly Goodson M.D., 10 mg at 03/18/20 2020  •  predniSONE (DELTASONE) tablet 5 mg, 5 mg, Oral, DAILY, Fly Goodson M.D., 5 mg at 03/20/20 0446  •  ipratropium-albuterol (DUONEB) nebulizer solution, 3 mL, Nebulization, Q6HRS PRN (RT), Kadeem Alvarenga M.D., 3 mL at 03/14/20 0740  •  Respiratory Therapy Consult, , Nebulization, Continuous RT, Kadeem Alvarenga M.D.  •  acetaZOLAMIDE SR (DIAMOX) capsule 1,000 mg, 1,000 mg, Oral, BID, Luis Felipe Yousif D.O., 1,000 mg at 03/20/20 0443  •  levothyroxine (SYNTHROID) tablet 100 mcg, 100 mcg, Oral, AM ES, Ortiz Camarillo M.D., 100 mcg at 03/20/20 0444  •  senna-docusate (PERICOLACE or SENOKOT S) 8.6-50 MG per tablet 2 Tab, 2 Tab, Oral, BID, 2 Tab at 03/20/20 0441 **AND** polyethylene glycol/lytes (MIRALAX) PACKET 1 Packet, 1 Packet, Oral, BID, Stopped at 03/18/20 1800 **AND** magnesium hydroxide (MILK OF MAGNESIA) suspension 30 mL, 30 mL, Oral, QDAY PRN **AND** bisacodyl (DULCOLAX) suppository 10 mg, 10 mg, Rectal, QDAY PRN, Ortiz Camarillo M.D.  •  enoxaparin (LOVENOX) inj 40 mg, 40 mg, Subcutaneous, DAILY, Indra Abreu M.D., 40 mg at 03/20/20 0453  •  Dulaglutide SOPN 1.5 mg, 1.5 mg, Subcutaneous, Q FRIDAY, Sonal Witt M.D., 1.5 mg at 03/20/20 0900  •  acetaminophen (TYLENOL) tablet 650 mg, 650 mg, Oral, Q6HRS PRN, Tony  RUFUS Way, 650 mg at 03/20/20 0813  •  albuterol inhaler 2 Puff, 2 Puff, Inhalation, Q6HRS PRN, Tony Way M.D., 2 Puff at 03/17/20 1030  •  aspirin EC (ECOTRIN) tablet 81 mg, 81 mg, Oral, DAILY, Tony Way M.D., 81 mg at 03/20/20 0440  •  budesonide-formoterol (SYMBICORT) 160-4.5 MCG/ACT inhaler 2 Puff, 2 Puff, Inhalation, BID, Tony Way M.D., 2 Puff at 03/20/20 0452  •  busPIRone (BUSPAR) tablet 10 mg, 10 mg, Oral, BID, Tony Way M.D., 10 mg at 03/20/20 0445  •  FLUoxetine (PROZAC) capsule 40 mg, 40 mg, Oral, DAILY, Tony Way M.D., 40 mg at 03/20/20 0446  •  mycophenolate (CELLCEPT) capsule 1,000 mg, 1,000 mg, Oral, BID, Tony Way M.D., 1,000 mg at 03/20/20 0449  •  ondansetron (ZOFRAN ODT) dispertab 4 mg, 4 mg, Oral, Q6HRS PRN, Tony Way M.D., 4 mg at 03/19/20 1542  •  OXcarbazepine (TRILEPTAL) tablet 150 mg, 150 mg, Oral, BID, Tony Way M.D., 150 mg at 03/20/20 0445  •  promethazine (PHENERGAN) suppository 25 mg, 25 mg, Rectal, Q6HRS PRN, Tony Way M.D., 25 mg at 03/07/20 2219  •  traZODone (DESYREL) tablet 50 mg, 50 mg, Oral, Q EVENING, Tony Way M.D., 50 mg at 03/19/20 1729  •  ziprasidone (GEODON) capsule 40 mg, 40 mg, Oral, BID, Tony Way M.D., 40 mg at 03/20/20 0440  •  Ivabradine HCl TABS 7.5 mg, 7.5 mg, Oral, BID, Tony Way M.D., 7.5 mg at 03/20/20 0600  •  oxyCODONE-acetaminophen (PERCOCET) 5-325 MG per tablet 1 Tab, 1 Tab, Oral, Q4HRS PRN, Tony Way M.D., 1 Tab at 03/20/20 0912  •  lidocaine (LIDODERM) 5 % 1 Patch, 1 Patch, Transdermal, Q24HR, Tony Way M.D., 1 Patch at 03/20/20 0453  •  pregabalin (LYRICA) capsule 300 mg, 300 mg, Oral, BID, Tony Way M.D., 300 mg at 03/20/20 0442    Physical Examination:     Vitals:    03/19/20 1600 03/19/20 2000 03/20/20 0400 03/20/20 0800   BP: 115/51 138/67 105/63 119/62   Pulse: 75 79 67 73   Resp: 17 16 16 16   Temp: 36.4 °C (97.5 °F) 36.6 °C (97.9 °F) 36.2 °C (97.1 °F) 36.1 °C (96.9 °F)   TempSrc:  Temporal Temporal Temporal Temporal   SpO2: 96% 93% 96% 96%   Weight:       Height:           General: Patient is awake and in no acute distress  Eyes: examination of optic disks not indicated at this time  CV: RRR    NEUROLOGICAL EXAM:   Mental status: Patient is awake alert she knows recently now she also date.  We came the room she appeared to be resting no obvious signs of pain.  Speech and language: speech is clear and fluent. The patient is able to name and repeat.  Cranial nerve exam: Pupils are equal, round and reactive to light bilaterally. Visual fields are full. Extraocular muscles are intact. Sensation in the face is intact to light touch. Face is symmetric. Hearing intact to conversation.. Palate elevates symmetrically. Shoulder shrug is full. Tongue is midline.  Motor exam:  She is not lifting her legs or arms against gravity.  She said her weakness in the arms got worse yesterday.  And her exam of December 2019 she had very poor effort in all 4 extremities as well.  Sensory exam: On left face she feels a burning sensation.  And generally tingling numbness otherwise light touch intact.     Deep tendon reflexes: Areflexic at bilateral patellar reflexes, in upper extremity 1+ at the biceps bilaterally.  Toes are equivocal.  Toes are equal bilaterally.  Coordination: Not moving extremities.     Gait: deferred patient is currently weak.  Objective Data:    Labs:  Lab Results   Component Value Date/Time    PROTHROMBTM 13.1 02/27/2020 01:10 AM    INR 0.97 02/27/2020 01:10 AM      Lab Results   Component Value Date/Time    WBC 5.8 03/19/2020 09:00 AM    WBC 6.1 07/20/2010 11:00 AM    RBC 4.05 (L) 03/19/2020 09:00 AM    RBC 4.38 07/20/2010 11:00 AM    HEMOGLOBIN 12.4 03/19/2020 09:00 AM    HEMATOCRIT 38.3 03/19/2020 09:00 AM    MCV 94.6 03/19/2020 09:00 AM    MCV 93 07/20/2010 11:00 AM    MCH 30.6 03/19/2020 09:00 AM    MCH 30.1 07/20/2010 11:00 AM    MCHC 32.4 (L) 03/19/2020 09:00 AM    MPV 12.0 03/19/2020  09:00 AM    NEUTSPOLYS 78.40 (H) 03/19/2020 09:00 AM    LYMPHOCYTES 13.90 (L) 03/19/2020 09:00 AM    MONOCYTES 5.70 03/19/2020 09:00 AM    EOSINOPHILS 1.40 03/19/2020 09:00 AM    BASOPHILS 0.30 03/19/2020 09:00 AM    ANISOCYTOSIS 1+ 07/08/2019 04:04 PM      Lab Results   Component Value Date/Time    SODIUM 138 03/19/2020 09:00 AM    POTASSIUM 3.4 (L) 03/19/2020 09:00 AM    CHLORIDE 112 03/19/2020 09:00 AM    CO2 15 (L) 03/19/2020 09:00 AM    GLUCOSE 85 03/19/2020 09:00 AM    BUN 9 03/19/2020 09:00 AM    CREATININE 0.71 03/19/2020 09:00 AM    CREATININE 0.75 (L) 07/20/2010 11:00 AM    BUNCREATRAT 19 07/20/2010 11:00 AM    GLOMRATE >59 07/20/2010 11:00 AM      Lab Results   Component Value Date/Time    CHOLSTRLTOT 150 11/08/2019 04:30 AM    LDL 70 11/08/2019 04:30 AM    HDL 50 11/08/2019 04:30 AM    TRIGLYCERIDE 149 11/08/2019 04:30 AM       Lab Results   Component Value Date/Time    ALKPHOSPHAT 55 03/19/2020 09:00 AM    ASTSGOT 8 (L) 03/19/2020 09:00 AM    ALTSGPT 15 03/19/2020 09:00 AM    TBILIRUBIN 0.3 03/19/2020 09:00 AM        Imaging/Testing:    I interpreted and/or reviewed the patient's neuroimaging    CT-HEAD W/O   Final Result      No evidence of acute intracranial process.      DX-KNEE 2- RIGHT   Final Result      1.  No acute findings or significant arthropathy identified. There is mild narrowing of the medial compartment      US-PELVIC COMPLETE (TRANSABDOMINAL/TRANSVAGINAL) (COMBO)   Final Result         Redemonstration of 3.7 x 2.8 cm cyst in the right ovary, slightly smaller than prior. No surrounding fluid to suggest rupture.      Debris in the urinary bladder. Correlate with UA.      IR-US GUIDED PIV   Final Result    Ultrasound-guided PERIPHERAL IV INSERTION performed by    qualified nursing staff as above.            US-JOSHUA SINGLE LEVEL BILAT   Final Result      DX-ANKLE 3+ VIEWS LEFT   Final Result         1.  No acute traumatic bony injury.         DX-ANKLE 3+ VIEWS RIGHT   Final Result         1.   No acute traumatic bony injury.         CT-HEAD W/O   Final Result         1.  No acute intracranial abnormality.      CT-LSPINE W/O PLUS RECONS   Final Result         1.  No acute traumatic bony injury of the lumbar spine.   2.  Punctate left renal calculus without visualized obstructive changes.          Assessment and Plan:  29-year-old status post severe concussion during last admission in December 2019,  For history of her recurrent optic neuritis she was on CellCept and prednisone still.  She cannot get MRI due to implanted device in the LS-spin   On review of her records she was started on  Diamox recently by neurology 500 mg  twice daily her opening pressure reported was 28 mm of water with 30 mL CSF removed, CSF results were negative by the notes of 2/25/2020.    Today neurology was again called regarding her new complaints of left facial burning sensation and more numbness in the arms and legs with inability to lift her arms and legs.  Which seems to be going on for some time with fluctuating intensity.  NMO spectrum disorder is in the differential with serial negative work-up so far, with a history of high opening pressure on LP and negative CSF.  Unfortunately we cannot get MRI to see evidence of new demyelination or flareup lesions in her brain or spine.    Given her recurrence increasing symptoms another course of IV Solu-Medrol 1 g daily for 4 days with tapering prednisone dose will be appropriate to consider at this point.    Start aggressive PT/OT.    Continue her current medications including Diamox.    Follow-up in neurology clinic as planned earlier, if symptoms are stable.    Please call with question.      Andrew Cobb MD  ABPN Board Certified Neurology  (t) [phone]

## 2020-03-20 NOTE — PROGRESS NOTES
2 RN skin check performed with: SONYA Blackwood    Sacrum red and blanching. R anterior foot sores present w/ mepilex lite. Bruising and sores across ABD. Elbows, occiput, heels, ears, and shoulder blades CDIB. Q2 hr turns, waffle overlay, seat cushion on chair when OOB, and mepilex to R foot in place to reduce skin breakdown. All appropriate LDA's, wound photos, and wound placed/in place. Education provided on activity and mobility encouraged.

## 2020-03-20 NOTE — CARE PLAN
Problem: Pain Management  Goal: Pain level will decrease to patient's comfort goal  Outcome: PROGRESSING AS EXPECTED  Pt educated on pharm/non-pharm measures to manage pain. Discussed desired pain level with pt.      Problem: Skin Integrity  Goal: Risk for impaired skin integrity will decrease  Outcome: PROGRESSING AS EXPECTED  Pt educated on importance of Q2H turns r/t decreased mobility and skin integrity. Pt verbalizes understanding. Waffle mattress in place. Barrier paste to sacrum.

## 2020-03-20 NOTE — PROGRESS NOTES
2 RN skin check performed  Sacrum/perineum pink and blanching.  Bilateral heels dry/flaky, pink and blanching.  R anterior foot wound- drainaige covered with a mepilex.  ABD scattered scabbing present. Clean and dry.  ABD bruising.  Device in place: foot drop boots, q2 turns, waffle overlay, seat cushion on chair, mepilex to wound.

## 2020-03-20 NOTE — CARE PLAN
Problem: Nutritional:  Goal: Achieve adequate nutritional intake  Description: Patient will consume >50% of meals   Outcome: PROGRESSING AS EXPECTED   Pt noted with variable PO intake ranging from either refused to >50%.  Preferred oral nutrition supplement in place - will continue to monitor for consistently sufficient intake.

## 2020-03-21 ENCOUNTER — APPOINTMENT (OUTPATIENT)
Dept: RADIOLOGY | Facility: MEDICAL CENTER | Age: 30
DRG: 880 | End: 2020-03-21
Attending: STUDENT IN AN ORGANIZED HEALTH CARE EDUCATION/TRAINING PROGRAM
Payer: MEDICARE

## 2020-03-21 PROCEDURE — A9270 NON-COVERED ITEM OR SERVICE: HCPCS | Performed by: STUDENT IN AN ORGANIZED HEALTH CARE EDUCATION/TRAINING PROGRAM

## 2020-03-21 PROCEDURE — 99233 SBSQ HOSP IP/OBS HIGH 50: CPT | Mod: GC | Performed by: INTERNAL MEDICINE

## 2020-03-21 PROCEDURE — 700111 HCHG RX REV CODE 636 W/ 250 OVERRIDE (IP): Performed by: INTERNAL MEDICINE

## 2020-03-21 PROCEDURE — 96367 TX/PROPH/DG ADDL SEQ IV INF: CPT

## 2020-03-21 PROCEDURE — 94150 VITAL CAPACITY TEST: CPT

## 2020-03-21 PROCEDURE — 700102 HCHG RX REV CODE 250 W/ 637 OVERRIDE(OP): Performed by: STUDENT IN AN ORGANIZED HEALTH CARE EDUCATION/TRAINING PROGRAM

## 2020-03-21 PROCEDURE — 700102 HCHG RX REV CODE 250 W/ 637 OVERRIDE(OP): Performed by: INTERNAL MEDICINE

## 2020-03-21 PROCEDURE — A9270 NON-COVERED ITEM OR SERVICE: HCPCS | Performed by: PSYCHIATRY & NEUROLOGY

## 2020-03-21 PROCEDURE — 76937 US GUIDE VASCULAR ACCESS: CPT

## 2020-03-21 PROCEDURE — 700111 HCHG RX REV CODE 636 W/ 250 OVERRIDE (IP): Performed by: STUDENT IN AN ORGANIZED HEALTH CARE EDUCATION/TRAINING PROGRAM

## 2020-03-21 PROCEDURE — 700105 HCHG RX REV CODE 258: Performed by: STUDENT IN AN ORGANIZED HEALTH CARE EDUCATION/TRAINING PROGRAM

## 2020-03-21 PROCEDURE — 700102 HCHG RX REV CODE 250 W/ 637 OVERRIDE(OP): Performed by: PSYCHIATRY & NEUROLOGY

## 2020-03-21 PROCEDURE — A9270 NON-COVERED ITEM OR SERVICE: HCPCS | Performed by: INTERNAL MEDICINE

## 2020-03-21 PROCEDURE — 05HF33Z INSERTION OF INFUSION DEVICE INTO LEFT CEPHALIC VEIN, PERCUTANEOUS APPROACH: ICD-10-PCS | Performed by: STUDENT IN AN ORGANIZED HEALTH CARE EDUCATION/TRAINING PROGRAM

## 2020-03-21 PROCEDURE — 96372 THER/PROPH/DIAG INJ SC/IM: CPT

## 2020-03-21 PROCEDURE — 770006 HCHG ROOM/CARE - MED/SURG/GYN SEMI*

## 2020-03-21 PROCEDURE — 700101 HCHG RX REV CODE 250: Performed by: INTERNAL MEDICINE

## 2020-03-21 RX ORDER — VITAMIN B COMPLEX
1000 TABLET ORAL DAILY
Status: DISCONTINUED | OUTPATIENT
Start: 2020-03-21 | End: 2020-03-24 | Stop reason: HOSPADM

## 2020-03-21 RX ADMIN — ACETAZOLAMIDE 1000 MG: 500 CAPSULE, EXTENDED RELEASE ORAL at 04:30

## 2020-03-21 RX ADMIN — OXCARBAZEPINE 150 MG: 150 TABLET, FILM COATED ORAL at 17:25

## 2020-03-21 RX ADMIN — TRAZODONE HYDROCHLORIDE 50 MG: 100 TABLET ORAL at 17:22

## 2020-03-21 RX ADMIN — ZIPRASIDONE HYDROCHLORIDE 40 MG: 40 CAPSULE ORAL at 04:30

## 2020-03-21 RX ADMIN — OXYCODONE HYDROCHLORIDE AND ACETAMINOPHEN 1 TABLET: 5; 325 TABLET ORAL at 17:22

## 2020-03-21 RX ADMIN — LIDOCAINE 1 PATCH: 50 PATCH TOPICAL at 04:38

## 2020-03-21 RX ADMIN — ACETAMINOPHEN 650 MG: 325 TABLET, FILM COATED ORAL at 20:13

## 2020-03-21 RX ADMIN — MYCOPHENOLATE MOFETIL 1000 MG: 250 CAPSULE ORAL at 19:31

## 2020-03-21 RX ADMIN — LIDOCAINE HYDROCHLORIDE 5 ML: 20 SOLUTION OROPHARYNGEAL at 17:27

## 2020-03-21 RX ADMIN — SENNOSIDES AND DOCUSATE SODIUM 2 TABLET: 8.6; 5 TABLET ORAL at 04:32

## 2020-03-21 RX ADMIN — ONDANSETRON 4 MG: 4 TABLET, ORALLY DISINTEGRATING ORAL at 06:04

## 2020-03-21 RX ADMIN — BUSPIRONE HYDROCHLORIDE 10 MG: 10 TABLET ORAL at 17:23

## 2020-03-21 RX ADMIN — MELATONIN 1000 UNITS: at 10:01

## 2020-03-21 RX ADMIN — LEVOTHYROXINE SODIUM 100 MCG: 100 TABLET ORAL at 04:36

## 2020-03-21 RX ADMIN — BUSPIRONE HYDROCHLORIDE 10 MG: 10 TABLET ORAL at 04:37

## 2020-03-21 RX ADMIN — BUDESONIDE AND FORMOTEROL FUMARATE DIHYDRATE 2 PUFF: 160; 4.5 AEROSOL RESPIRATORY (INHALATION) at 06:17

## 2020-03-21 RX ADMIN — PREGABALIN 300 MG: 100 CAPSULE ORAL at 04:35

## 2020-03-21 RX ADMIN — ENOXAPARIN SODIUM 40 MG: 100 INJECTION SUBCUTANEOUS at 04:39

## 2020-03-21 RX ADMIN — LIDOCAINE HYDROCHLORIDE 5 ML: 20 SOLUTION OROPHARYNGEAL at 10:02

## 2020-03-21 RX ADMIN — SODIUM CHLORIDE 1000 MG: 9 INJECTION, SOLUTION INTRAVENOUS at 14:58

## 2020-03-21 RX ADMIN — MODAFINIL 200 MG: 100 TABLET ORAL at 04:37

## 2020-03-21 RX ADMIN — OXYCODONE HYDROCHLORIDE AND ACETAMINOPHEN 1 TABLET: 5; 325 TABLET ORAL at 04:32

## 2020-03-21 RX ADMIN — CLOTRIMAZOLE: 10 CREAM TOPICAL at 04:39

## 2020-03-21 RX ADMIN — ASPIRIN 81 MG: 81 TABLET, COATED ORAL at 04:30

## 2020-03-21 RX ADMIN — PREGABALIN 300 MG: 100 CAPSULE ORAL at 17:21

## 2020-03-21 RX ADMIN — MYCOPHENOLATE MOFETIL 1000 MG: 250 CAPSULE ORAL at 04:34

## 2020-03-21 RX ADMIN — FLUOXETINE HYDROCHLORIDE 40 MG: 20 CAPSULE ORAL at 04:35

## 2020-03-21 RX ADMIN — ZIPRASIDONE HYDROCHLORIDE 40 MG: 40 CAPSULE ORAL at 17:26

## 2020-03-21 RX ADMIN — OXCARBAZEPINE 150 MG: 150 TABLET, FILM COATED ORAL at 04:32

## 2020-03-21 RX ADMIN — ACETAZOLAMIDE 1000 MG: 500 CAPSULE, EXTENDED RELEASE ORAL at 17:21

## 2020-03-21 ASSESSMENT — ENCOUNTER SYMPTOMS
WEAKNESS: 1
MYALGIAS: 0
BRUISES/BLEEDS EASILY: 0
EYE PAIN: 1
FEVER: 0
DEPRESSION: 1
NAUSEA: 0
COUGH: 1
HALLUCINATIONS: 0
BACK PAIN: 0
SORE THROAT: 0
NERVOUS/ANXIOUS: 1

## 2020-03-21 ASSESSMENT — PATIENT HEALTH QUESTIONNAIRE - PHQ9
2. FEELING DOWN, DEPRESSED, IRRITABLE, OR HOPELESS: SEVERAL DAYS
1. LITTLE INTEREST OR PLEASURE IN DOING THINGS: NOT AT ALL
SUM OF ALL RESPONSES TO PHQ9 QUESTIONS 1 AND 2: 1

## 2020-03-21 ASSESSMENT — FIBROSIS 4 INDEX: FIB4 SCORE: 0.43

## 2020-03-21 ASSESSMENT — PULMONARY FUNCTION TESTS: FVC: 1

## 2020-03-21 ASSESSMENT — LIFESTYLE VARIABLES: SUBSTANCE_ABUSE: 0

## 2020-03-21 NOTE — PROGRESS NOTES
Called PICC team with no answer - left message inquiring about timing of midline placement. Waiting to hear back.

## 2020-03-21 NOTE — PROGRESS NOTES
CROSS COVERAGE NOTE:    Patient was scheduled to start IV Steroids but has no access available. Per nursing, several attempts were made to place PIV including by ultrasound without success.     Plan:  -Patient to have midline set up in am to start IV steroid infusion when vascular access team is in

## 2020-03-21 NOTE — CARE PLAN
Problem: Safety  Goal: Will remain free from injury  Outcome: PROGRESSING AS EXPECTED  Pt educated on utilizing call light. Pt verbalizes understanding and calls appropriately. Bed alarm in place. Bed locked and in lowest position.     Problem: Pain Management  Goal: Pain level will decrease to patient's comfort goal  Outcome: PROGRESSING AS EXPECTED  Pt educated on pharm/non-pharm measures to maintain pain. Discussed with pt on desired pain level.

## 2020-03-21 NOTE — PROCEDURES
Vascular Access Team    Date of Insertion: 3/21/20  Arm Circumference: n/a  Line Length: 10  Line Size: 20   Vein Occupancy %: 37  Reason for Midline: Access  Labs: WBC 5.8, , BUN 9, Cr 0.71, GFR >60, INR N/A    Orders confirmed, vessel patency confirmed with ultrasound. Risks and benefits of procedure explained to patient and education regarding line associated bloodstream infections provided. Questions answered.     PowerGlide Midline placed in LUE per licensed provider order with ultrasound guidance. 20g, 10 cm line placed in Cephalic vein after 1 attempt(s).  Catheter inserted with brisk blood return. Secured with 0cm external from insertion site.  Line flushed without resistance with 10 mL 0.9% normal saline.  Midline secured with Biopatch and Tegaderm.     Midline placement is confirmed by nurse using ultrasound and ability to flush and draw blood. Midline is appropriate for use at this time.  No X-ray is needed for placement confirmation. Pt tolerated procedure well.  Patient condition relayed to unit RN or ordering physician via this post procedure note in the EMR.    Ultrasound images uploaded to PACS and viewable in the EMR - yes  Ultrasound imaged printed and placed in paper chart - no      BARD PowerGlide Midline ref # [id number], Lot # [id number], Expiration Date 1/31/20

## 2020-03-21 NOTE — PROGRESS NOTES
Daily Progress Note:     Date of Service: 3/21/2020  Primary Team: [facility] White Team   Attending: Gilmar DÍAZ   Senior Resident: Dr. Adams  Intern: Dr. Goodson  Contact:  [phone]    Chief Complaint:   Bilateral lower extremity weakness    Subjective:    No acute overnight event. Afebrile.   Patient evaluated by Neurology, recommended IV solu-medrol 1 g x 4 day followed by taper  - unable to obtain IV access   - midline placed, steroids started  Patient report she feels weaker, she can no longer move her upper extremities. She report numbness in bilateral upper extremity and torso.  She is saturating well on room air. Report subjective shortness of breath.   ICS by bedside, RT to trend NIF.       Consultants/Specialty:  Neuropthalmology  Neurology  Psychiatry  Review of Systems:    Constitutional: Negative for chills and fever.   Eyes:        Chronic visual changes   Mouth: aphthous ulcer  Respiratory: Normal breathing, Negative for cough and hemoptysis.    Cardiovascular: Negative for chest pain, palpitations and leg swelling.   Gastrointestinal: Negative for abdominal pain, nausea and vomiting.   Genitourinary:        Reports burning sensation   Musculoskeletal: Positive for back pain chronic. Bilateral upper extremity weakness , unable to raise against gravity  Neurological: Positive for headaches. hemiparesthesia       Chronic wooshing, chronic bilateral lower extremity weakness  Objective Data:   Physical Exam:   Vitals:   Temp:  [36.1 °C (96.9 °F)-36.7 °C (98 °F)] 36.2 °C (97.1 °F)  Pulse:  [69-73] 73  Resp:  [16-20] 18  BP: (103-122)/(52-72) 112/57  SpO2:  [92 %-97 %] 92 %     Constitutional:       General: She is not in acute distress.     Appearance: Normal appearance. She is obese.   HENT:      Head: Normocephalic and atraumatic. Mouth, erythema in tongue and roof of mouth without white plaque  Eyes:      Extraocular Movements: Extraocular movements intact.      Pupils: Pupils are equal, round, and reactive  to light.   Neck:      Musculoskeletal: Normal range of motion and neck supple.   Cardiovascular:      Rate and Rhythm: Normal rate and regular rhythm.      Heart sounds: Normal heart sounds.   Pulmonary:      Effort: Pulmonary effort is normal. No respiratory distress.      Breath sounds: Normal breath sounds. No expiratory wheezing.   Abdominal:      General: Bowel sounds are normal. There is no distension.      Palpations: Abdomen is soft.   Musculoskeletal:         General: No swelling or deformity.     Genital exam: (Performed 03/15/20 with her nurse as chaperone, no ulcers, skin irritation and redness mostly in the skin folds, she is morbidly obese.)     Back: examined, no erythema, no sores  Skin:     General: Skin is warm and dry. Abrasion on right top foot    Neurological:      Mental Status: She is alert and oriented to person, place, and time. Mental status is at baseline.    RUE/LUE cannot raise against gravity  LLE did not appreciate patellar reflex, chronic weakness, cannot raise against gravity  RLE did not appreciate patellar reflex, chronic weakness, cannot raise against gravity  Cranial nerve II-XII intact  Face symmetrical, speech fluent    Labs:   Recent Labs     03/19/20  0900   WBC 5.8   RBC 4.05*   HEMOGLOBIN 12.4   HEMATOCRIT 38.3   MCV 94.6   MCH 30.6   RDW 47.6   PLATELETCT 144*   MPV 12.0   NEUTSPOLYS 78.40*   LYMPHOCYTES 13.90*   MONOCYTES 5.70   EOSINOPHILS 1.40   BASOPHILS 0.30     Recent Labs     03/19/20  0900   SODIUM 138   POTASSIUM 3.4*   CHLORIDE 112   CO2 15*   GLUCOSE 85   BUN 9     Recent Labs     03/19/20  0900   ALBUMIN 4.1   TBILIRUBIN 0.3   ALKPHOSPHAT 55   TOTPROTEIN 6.0   ALTSGPT 15   ASTSGOT 8*   CREATININE 0.71       Imaging:   No new imaging    * Leg weakness, bilateral  Assessment & Plan  -Patient caregiver states she is unable to provide safe transfers between chair and bed  -PT states there is no more equipment they can provide to assist with it  -It is likely that  patient will require long term care facility placement  -CT spine ruled out any acute pathology   -Patient unable to undergo MRI because of Stimulator in place.     Neurology consult, appreciate recommendations  Neuropthalmology recommendation: continue med :   1. Diamox, 1000 mg p.o. b.i.d.  2. Maintain the prednisone 5 mg p.o. daily.  3. Maintain the CellCept 1000 mg p.o. b.i.d.  4. Follow up in neurophthalmology clinic at discharge  Psychiatric consult, continue psychiatric medication  Q4 neuro checks.   Pain control for LBP, right knee pain  Pending placement      Numbness  Assessment & Plan  -Numbness in left side of face and left side of body started 03/19/20 AM, progressed to right sided numbness and weakness 03/20/20 AM  -CT head negative for acute intracranial abnormality, ventricle size normal, no intracranial hemorrhage  -Onset of her symptoms occurred together, facial numbness and left body hemiparesthesia. The distribution of her symptoms cannot be explained with one organic neurological cause. She does have functional transverse myelitis, but that would not explain her facial numbness.   - History of negative NMO spectrum disorder workup  - MRI cannot be done due to Interstim device for incontinence  Plan  - Midline placed  - IV solumedrol 1g x 4 days ( started 03/21/20)  - will need prednisone taper  - continue to monitor, neuro check q4H     Glossitis  Assessment & Plan  - Erythema on tongue and roof of mouth on examination without white plaque  - Glossitis, unknown etiology, ddx iatrogenic, nutitional  - albuterol induced glossitis vs symbicor induced thrush vs seebri induced glossitis  - B12 300s 02/2020, folate WNL ( 2019) , ferritin 100s (2018)  Plan  - BMX for pain.    - Encourage oral hydration, rinse mouth after symbicort use  - discontinue seebri  -Discussed with pharmacy, she is on multiple medications that can affect her immunity for can cause rash, will keep monitoring.    Intracranial  pressure increased  Assessment & Plan  -Clinically stable.   -Headache with whooshing sounds  -Patient was previously on Lasix started at dose of 20mg in 2017 for bilateral lower extremity edema, and has been on lasix for several years. Her lasix 80mg daily was discontinued upon discharge from hospital 02/25/2020.   -Patient was started on Diamox / filled diamox 02/27/20 outpatient , dose increased while patient was inpatient during this hospitalization to 1000mg BID per neurology    Plan :  Continue 1000mg BID Diamox         TONYA (obstructive sleep apnea)- (present on admission)  Assessment & Plan  Not on CPAP at home. Patient has not followed up with doctor yet for the same.   Follow up outpatient.     UTI (urinary tract infection)  Assessment & Plan  History of dysuria,  UA: Cloudy urine, nitrite positive, leukocyte esterase small, WBC 20-50, bacteria many  Urine culture: Lactose fermenting Gram negative zita   >100,000 cfu/mL -Klebsiella pneumoniae sensitive to bactrim.  Got Bactrim for 3 days  - UA 03/11/20 with trace LE, negative nitrite and mod epithelial cell, low WBC, likely skin letty contaminate. Will wait for rflx cx    DM (diabetes mellitus) (HCC)- (present on admission)  Assessment & Plan  A1C 6.0 (08/2019)  Continue home meds.   Dulaglutide injection every Friday .   Diabetic diet.    Asthma- (present on admission)  Assessment & Plan  Continue home meds  Start montelukast and glycopyrronium bromide as per RT rec  Saturating well on room air.     Hypothyroidism- (present on admission)  Assessment & Plan  TSH 5.65  Increased Levothyroxine to 100mcg on 3/3  Continue levothyroxine, Consider TSH/ with reflex 4/3/20      Joint pain  Assessment & Plan  Right knee pain significantly improved.   Patient report slipped off bed while in sitting position and landed on her knees and right elbow  She has significant pain on right knee upon palpation or movement  Given mechanism of injury will consider conservative  management with rest, ice and lidocaine patch for right knee  Xray of R knee without bony abnormality or joint effusion  PLAN  Continue Ibuprofen 400mg Q6H Prn ordered for 3 days for acute episode of pain in right knee  Conservative management

## 2020-03-21 NOTE — PSYCHIATRY
"PSYCHIATRIC FOLLOW-UP:(established)  *Reason for admission:  Ground level fall.  *Legal Hold Status on Admission:   No legal hold.  Supervising Psychiatrist:          Dr. Sabillon     *HPI:          Patient is a 29 year old female with history of schizoaffective disorder, depression, EVELIA, and borderline personality disorder who presented to the ED after a ground level fall.     Patient reports \"exacerbation of transverse myelitis.\"  She said she has developed weakness and numbness in her extremities.  Reports being unable to eat or move arms or feet.  Reports developing contractures in hands bilaterally.  She reports being anxious and worries about having a panic attack.  Reports continued depression.  She says she continues to be more awake with modafinil, but has some eye pain because \"my body wants to sleep.\"  Continues to sleep better at night.  Patient agrees to try to feed herself today.  She continues to be medication adherent and tolerating her medications well.  Continues to deny suicidal or homicidal ideation.  Continues to deny auditory and visual hallucinations.      Medical ROS (as pertinent):    Review of Systems   Constitutional: Negative for fever.   HENT: Negative for sore throat.    Eyes: Positive for pain.   Respiratory: Positive for cough.    Cardiovascular: Negative for chest pain.   Gastrointestinal: Negative for nausea.   Genitourinary: Negative for dysuria.   Musculoskeletal: Negative for back pain and myalgias.   Skin: Negative for rash.   Neurological: Positive for weakness.   Endo/Heme/Allergies: Does not bruise/bleed easily.   Psychiatric/Behavioral: Positive for depression. Negative for hallucinations, substance abuse and suicidal ideas. The patient is nervous/anxious.      Psychiatric Examination:  Vitals:    03/21/20 0800   BP: 116/60   Pulse: 72   Resp: 16   Temp: 37.1 °C (98.7 °F)   SpO2: 94%     General Appearance:  Patient appears stated age, fairly kempt, fair hygiene, lying in a " "hospital bed.   Abnormal Movements:  No tremor or dystonia noted.  Gait and Posture:  Could not assess.  Speech:  Normal tone, volume, and rate. Spontaneous and on pressured.  Thought processes: Linear and goal directed.  Associations: No loose or clang associations.   Abnormal or Psychotic Thoughts: Denies AH, VH, paranoia, and delusions. Does not appear to be responding to internal stimuli.  Judgement and Insight: Poor / Poor   Orientation:  AAOx3  Recent and Remote Memory:  Grossly intact.  Attention Span and Concentration: Grossly intact.  Language:  Fluent in English  Fund of Knowledge: Adequate.   Mood and Affect: \"Anxious.\"  Dysthymic, anxious, and constricted.  Non-irritable and non-labile  SI/HI: Denies SI, Denies HI.     *ASSESSMENT:  Patient is a 29 year old female with history of schizoaffective disorder, depression, EVELIA, and borderline personality disorder who presented to the ED after a ground level fall.  Patient continues to display functional neurological disorder with weakness.  She should be encouraged to complete as many ADLs as possible.  Should be encouraged to sit in her wheelchair as much as possible if tolerated.  Patient continues to be significantly depressed. She does not represent a significant risk of harm to self and does not meet criteria for legal hold at this time.     Dx:   -Functional Neurological Disorder With weakness   -Schizoaffective disorder  -Generalized anxiety  -Borderline personality disorder     Medical:   -History of transverse myelitis  -Chronic pain syndrome  -GERD  -Diabetes mellitus type 2  -Obstructive sleep apnea  -Hypothyroidism, on Synthroid  -Increased intracranial pressure     PLAN:  -Patient does not meet criteria for legal hold.  -Continue modafinil 200mg PO qam for somnolence.  -Continue BuSpar 10 mg twice daily for anxiety  -Continue fluoxetine 40 mg daily for mood.  -Continue Trileptal 150 mg twice daily for mood  -Continue trazodone 50 mg nightly for " insomnia  -Recommend considering increase in Levothyroxine.  -Continue Geodon 40 mg twice daily for psychosis  - Reviewed risks, benefits, alternatives to include no treatment, therapy and medications  - Will continue to follow.     Thank you for the Consult.    Patient seen on rounds with Dr. Sabillon, attending psychiatrist, and treatment plan was discussed.

## 2020-03-22 LAB
ALBUMIN SERPL BCP-MCNC: 4.2 G/DL (ref 3.2–4.9)
ALBUMIN/GLOB SERPL: 2 G/DL
ALP SERPL-CCNC: 61 U/L (ref 30–99)
ALT SERPL-CCNC: 16 U/L (ref 2–50)
ANION GAP SERPL CALC-SCNC: 13 MMOL/L (ref 7–16)
AST SERPL-CCNC: 8 U/L (ref 12–45)
BASOPHILS # BLD AUTO: 0 % (ref 0–1.8)
BASOPHILS # BLD: 0 K/UL (ref 0–0.12)
BILIRUB SERPL-MCNC: 0.2 MG/DL (ref 0.1–1.5)
BUN SERPL-MCNC: 9 MG/DL (ref 8–22)
CALCIUM SERPL-MCNC: 9.4 MG/DL (ref 8.5–10.5)
CHLORIDE SERPL-SCNC: 111 MMOL/L (ref 96–112)
CO2 SERPL-SCNC: 14 MMOL/L (ref 20–33)
CREAT SERPL-MCNC: 0.67 MG/DL (ref 0.5–1.4)
EOSINOPHIL # BLD AUTO: 0 K/UL (ref 0–0.51)
EOSINOPHIL NFR BLD: 0 % (ref 0–6.9)
ERYTHROCYTE [DISTWIDTH] IN BLOOD BY AUTOMATED COUNT: 44.2 FL (ref 35.9–50)
GLOBULIN SER CALC-MCNC: 2.1 G/DL (ref 1.9–3.5)
GLUCOSE SERPL-MCNC: 181 MG/DL (ref 65–99)
HCT VFR BLD AUTO: 39.3 % (ref 37–47)
HGB BLD-MCNC: 13.2 G/DL (ref 12–16)
IMM GRANULOCYTES # BLD AUTO: 0.03 K/UL (ref 0–0.11)
IMM GRANULOCYTES NFR BLD AUTO: 0.6 % (ref 0–0.9)
LYMPHOCYTES # BLD AUTO: 0.46 K/UL (ref 1–4.8)
LYMPHOCYTES NFR BLD: 9.9 % (ref 22–41)
MCH RBC QN AUTO: 30.5 PG (ref 27–33)
MCHC RBC AUTO-ENTMCNC: 33.6 G/DL (ref 33.6–35)
MCV RBC AUTO: 90.8 FL (ref 81.4–97.8)
MONOCYTES # BLD AUTO: 0.05 K/UL (ref 0–0.85)
MONOCYTES NFR BLD AUTO: 1.1 % (ref 0–13.4)
NEUTROPHILS # BLD AUTO: 4.13 K/UL (ref 2–7.15)
NEUTROPHILS NFR BLD: 88.4 % (ref 44–72)
NRBC # BLD AUTO: 0 K/UL
NRBC BLD-RTO: 0 /100 WBC
PLATELET # BLD AUTO: 175 K/UL (ref 164–446)
PMV BLD AUTO: 11.6 FL (ref 9–12.9)
POTASSIUM SERPL-SCNC: 3.5 MMOL/L (ref 3.6–5.5)
PROT SERPL-MCNC: 6.3 G/DL (ref 6–8.2)
RBC # BLD AUTO: 4.33 M/UL (ref 4.2–5.4)
SODIUM SERPL-SCNC: 138 MMOL/L (ref 135–145)
WBC # BLD AUTO: 4.7 K/UL (ref 4.8–10.8)

## 2020-03-22 PROCEDURE — A9270 NON-COVERED ITEM OR SERVICE: HCPCS | Performed by: STUDENT IN AN ORGANIZED HEALTH CARE EDUCATION/TRAINING PROGRAM

## 2020-03-22 PROCEDURE — 700101 HCHG RX REV CODE 250: Performed by: INTERNAL MEDICINE

## 2020-03-22 PROCEDURE — 36415 COLL VENOUS BLD VENIPUNCTURE: CPT

## 2020-03-22 PROCEDURE — 700111 HCHG RX REV CODE 636 W/ 250 OVERRIDE (IP): Performed by: INTERNAL MEDICINE

## 2020-03-22 PROCEDURE — 700102 HCHG RX REV CODE 250 W/ 637 OVERRIDE(OP): Performed by: STUDENT IN AN ORGANIZED HEALTH CARE EDUCATION/TRAINING PROGRAM

## 2020-03-22 PROCEDURE — 700105 HCHG RX REV CODE 258: Performed by: STUDENT IN AN ORGANIZED HEALTH CARE EDUCATION/TRAINING PROGRAM

## 2020-03-22 PROCEDURE — 80053 COMPREHEN METABOLIC PANEL: CPT

## 2020-03-22 PROCEDURE — 700111 HCHG RX REV CODE 636 W/ 250 OVERRIDE (IP): Performed by: STUDENT IN AN ORGANIZED HEALTH CARE EDUCATION/TRAINING PROGRAM

## 2020-03-22 PROCEDURE — 700102 HCHG RX REV CODE 250 W/ 637 OVERRIDE(OP): Performed by: PSYCHIATRY & NEUROLOGY

## 2020-03-22 PROCEDURE — 770006 HCHG ROOM/CARE - MED/SURG/GYN SEMI*

## 2020-03-22 PROCEDURE — 85025 COMPLETE CBC W/AUTO DIFF WBC: CPT

## 2020-03-22 PROCEDURE — 99232 SBSQ HOSP IP/OBS MODERATE 35: CPT | Mod: GC | Performed by: INTERNAL MEDICINE

## 2020-03-22 PROCEDURE — A9270 NON-COVERED ITEM OR SERVICE: HCPCS | Performed by: PSYCHIATRY & NEUROLOGY

## 2020-03-22 PROCEDURE — A9270 NON-COVERED ITEM OR SERVICE: HCPCS | Performed by: INTERNAL MEDICINE

## 2020-03-22 PROCEDURE — 700102 HCHG RX REV CODE 250 W/ 637 OVERRIDE(OP): Performed by: INTERNAL MEDICINE

## 2020-03-22 RX ADMIN — ZIPRASIDONE HYDROCHLORIDE 40 MG: 40 CAPSULE ORAL at 04:25

## 2020-03-22 RX ADMIN — PREGABALIN 300 MG: 100 CAPSULE ORAL at 16:36

## 2020-03-22 RX ADMIN — MONTELUKAST 10 MG: 10 TABLET, FILM COATED ORAL at 20:32

## 2020-03-22 RX ADMIN — LIDOCAINE HYDROCHLORIDE 5 ML: 20 SOLUTION OROPHARYNGEAL at 13:09

## 2020-03-22 RX ADMIN — MODAFINIL 200 MG: 100 TABLET ORAL at 04:24

## 2020-03-22 RX ADMIN — LIDOCAINE HYDROCHLORIDE 5 ML: 20 SOLUTION OROPHARYNGEAL at 09:21

## 2020-03-22 RX ADMIN — ACETAZOLAMIDE 1000 MG: 500 CAPSULE, EXTENDED RELEASE ORAL at 04:24

## 2020-03-22 RX ADMIN — ZIPRASIDONE HYDROCHLORIDE 40 MG: 40 CAPSULE ORAL at 16:36

## 2020-03-22 RX ADMIN — TRAZODONE HYDROCHLORIDE 50 MG: 100 TABLET ORAL at 16:39

## 2020-03-22 RX ADMIN — MYCOPHENOLATE MOFETIL 1000 MG: 250 CAPSULE ORAL at 16:37

## 2020-03-22 RX ADMIN — LIDOCAINE HYDROCHLORIDE 5 ML: 20 SOLUTION OROPHARYNGEAL at 17:01

## 2020-03-22 RX ADMIN — CLOTRIMAZOLE: 10 CREAM TOPICAL at 09:21

## 2020-03-22 RX ADMIN — PREGABALIN 300 MG: 100 CAPSULE ORAL at 04:24

## 2020-03-22 RX ADMIN — SENNOSIDES AND DOCUSATE SODIUM 2 TABLET: 8.6; 5 TABLET ORAL at 16:36

## 2020-03-22 RX ADMIN — ASPIRIN 81 MG: 81 TABLET, COATED ORAL at 04:25

## 2020-03-22 RX ADMIN — OXCARBAZEPINE 150 MG: 150 TABLET, FILM COATED ORAL at 04:24

## 2020-03-22 RX ADMIN — ACETAMINOPHEN 650 MG: 325 TABLET, FILM COATED ORAL at 04:24

## 2020-03-22 RX ADMIN — LEVOTHYROXINE SODIUM 100 MCG: 100 TABLET ORAL at 04:25

## 2020-03-22 RX ADMIN — MYCOPHENOLATE MOFETIL 1000 MG: 250 CAPSULE ORAL at 04:38

## 2020-03-22 RX ADMIN — SODIUM CHLORIDE 1000 MG: 900 INJECTION, SOLUTION INTRAVENOUS at 13:54

## 2020-03-22 RX ADMIN — OXYCODONE HYDROCHLORIDE AND ACETAMINOPHEN 1 TABLET: 5; 325 TABLET ORAL at 01:47

## 2020-03-22 RX ADMIN — BUSPIRONE HYDROCHLORIDE 10 MG: 10 TABLET ORAL at 04:25

## 2020-03-22 RX ADMIN — OXYCODONE HYDROCHLORIDE AND ACETAMINOPHEN 1 TABLET: 5; 325 TABLET ORAL at 20:32

## 2020-03-22 RX ADMIN — ACETAZOLAMIDE 1000 MG: 500 CAPSULE, EXTENDED RELEASE ORAL at 16:36

## 2020-03-22 RX ADMIN — OXCARBAZEPINE 150 MG: 150 TABLET, FILM COATED ORAL at 16:36

## 2020-03-22 RX ADMIN — CLOTRIMAZOLE: 10 CREAM TOPICAL at 16:36

## 2020-03-22 RX ADMIN — ENOXAPARIN SODIUM 40 MG: 100 INJECTION SUBCUTANEOUS at 04:24

## 2020-03-22 RX ADMIN — MELATONIN 1000 UNITS: at 04:25

## 2020-03-22 RX ADMIN — BUSPIRONE HYDROCHLORIDE 10 MG: 10 TABLET ORAL at 16:36

## 2020-03-22 RX ADMIN — FLUOXETINE HYDROCHLORIDE 40 MG: 20 CAPSULE ORAL at 04:25

## 2020-03-22 RX ADMIN — LIDOCAINE 1 PATCH: 50 PATCH TOPICAL at 04:23

## 2020-03-22 RX ADMIN — OXYCODONE HYDROCHLORIDE AND ACETAMINOPHEN 1 TABLET: 5; 325 TABLET ORAL at 07:18

## 2020-03-22 ASSESSMENT — PAIN SCALES - WONG BAKER: WONGBAKER_NUMERICALRESPONSE: HURTS JUST A LITTLE BIT

## 2020-03-22 NOTE — CARE PLAN
Problem: Safety  Goal: Will remain free from injury  Outcome: PROGRESSING AS EXPECTED     Problem: Skin Integrity  Goal: Risk for impaired skin integrity will decrease  Outcome: PROGRESSING AS EXPECTED     Problem: Pain Management  Goal: Pain level will decrease to patient's comfort goal  Outcome: PROGRESSING AS EXPECTED

## 2020-03-22 NOTE — DIETARY
Nutrition Services: Brief update    Consult received for poor PO intake due to unspecified oral/tongue pain. Over last 2 days, pt has eaten variably with PO intake ranging <25% of meals to % meals. Overall pt eating mostly 25-50%. Pt also has nourishments only in [employer], discussed with NR to check in with pt on preferences.    RD is already following to monitor PO intake and has supplements in place. Will continue to follow per department policy.

## 2020-03-22 NOTE — PROGRESS NOTES
Daily Progress Note:     Date of Service: 3/22/2020  Primary Team: [facility] White Team   Attending: Liu Schafer M.D.   Senior Resident: Dr. Adams  Intern: Dr. Goodson  Contact:  [phone]    Chief Complaint:   Bilateral lower extremity weakness    Subjective  No overnight event. Afebrile.  Midline placed yesterday, IV solumedrol ( 1g x4 days). Patient appears visibly better. She is able to move her upper extremity, and reports she is breathing better. Bedside ICS 3000mL. She also reports she regained some strength in her bilateral lower extremity.    - Patient confirmed she wants to be DNAR but is okay with intubation.  - IV solumedrol 1g x 4 days ( started 03/21/20- EOT 03/24/20)  - Will need prednisone taper starting 03/25/20  - She take prednisone 5mg for optic neuritis, discontinued, given current regimen of iv steroids, will need to be restarted after prednisone taper  - dietary consult placed  - Pending placement: follow up with  tomorrow AM    Consultants/Specialty:  Neuropthalmology  Neurology  Psychiatry    Review of Systems:    Constitutional: Negative for chills and fever.   Eyes:        Chronic visual changes   Mouth: aphthous ulcer  Respiratory: Normal breathing, Negative for cough and hemoptysis.    Cardiovascular: Negative for chest pain, palpitations and leg swelling.   Gastrointestinal: Negative for abdominal pain, nausea and vomiting.   Genitourinary: incontinence  Musculoskeletal: Positive for back pain chronic. Bilateral lower extremity weakness (chronic)  Neurological: Positive for headaches. hemiparesthesia       Chronic wooshing, chronic bilateral lower extremity weakness  Objective Data:   Physical Exam:   Vitals:   Temp:  [36.1 °C (97 °F)-37.1 °C (98.7 °F)] 36.1 °C (97 °F)  Pulse:  [69-85] 81  Resp:  [15-18] 18  BP: (102-119)/(54-66) 119/66  SpO2:  [92 %-94 %] 92 %    Constitutional:       General: She is not in acute distress.     Appearance: Normal appearance. She is obese.    HENT:      Head: Normocephalic and atraumatic. Slight redness to tip of tongue.   Eyes:      Extraocular Movements: Extraocular movements intact.      Pupils: Pupils are equal, round, and reactive to light.   Neck:      Musculoskeletal: Normal range of motion and neck supple.   Cardiovascular:      Rate and Rhythm: Normal rate and regular rhythm.      Heart sounds: Normal heart sounds.   Pulmonary:      Effort: Pulmonary effort is normal. No respiratory distress.      Breath sounds: Normal breath sounds. No expiratory wheezing.   Abdominal:      General: Bowel sounds are normal. There is no distension.      Palpations: Abdomen is soft.   Musculoskeletal:         General: No swelling or deformity.     Genital exam: (Performed 03/15/20 with her nurse as chaperone, no ulcers, skin irritation and redness mostly in the skin folds, she is morbidly obese.)  Skin:     General: Skin is warm and dry. Abrasion on right top foot    Neurological:      Mental Status: She is alert and oriented to person, place, and time. Mental status is at baseline.    RUE/LUE 4/5 strength  LLE did not appreciate patellar reflex, chronic weakness, cannot raise against gravity  RLE patellar reflex intact, chronic weakness, cannot raise against gravity  Cranial nerve II-XII intact  Face symmetrical, speech fluent    Labs:   Recent Labs     03/19/20  0900 03/22/20  0544   WBC 5.8 4.7*   RBC 4.05* 4.33   HEMOGLOBIN 12.4 13.2   HEMATOCRIT 38.3 39.3   MCV 94.6 90.8   MCH 30.6 30.5   RDW 47.6 44.2   PLATELETCT 144* 175   MPV 12.0 11.6   NEUTSPOLYS 78.40* 88.40*   LYMPHOCYTES 13.90* 9.90*   MONOCYTES 5.70 1.10   EOSINOPHILS 1.40 0.00   BASOPHILS 0.30 0.00     Recent Labs     03/19/20  0900 03/22/20  0544   SODIUM 138 138   POTASSIUM 3.4* 3.5*   CHLORIDE 112 111   CO2 15* 14*   GLUCOSE 85 181*   BUN 9 9     Recent Labs     03/19/20  0900 03/22/20  0544   ALBUMIN 4.1 4.2   TBILIRUBIN 0.3 0.2   ALKPHOSPHAT 55 61   TOTPROTEIN 6.0 6.3   ALTSGPT 15 16    ASTSGOT 8* 8*   CREATININE 0.71 0.67       Imaging:   No new imaging    * Leg weakness, bilateral  Assessment & Plan  -Patient caregiver states she is unable to provide safe transfers between chair and bed  -PT states there is no more equipment they can provide to assist with it  -It is likely that patient will require long term care facility placement  -CT spine ruled out any acute pathology   -Patient unable to undergo MRI because of Stimulator in place.     Neurology consult, appreciate recommendations  Neuropthalmology recommendation: continue med :   1. Diamox, 1000 mg p.o. b.i.d.  2. Maintain the prednisone 5 mg p.o. daily.  3. Maintain the CellCept 1000 mg p.o. b.i.d.  4. Follow up in neurophthalmology clinic at discharge  Psychiatric consult, continue psychiatric medication  Q4 neuro checks.   Pain control for LBP, right knee pain  Pending placement      Numbness  Assessment & Plan  Improving/ Resolving with IV solumedrol   -Numbness in left side of face and left side of body started 03/19/20 AM, progressed to right sided numbness and weakness 03/20/20 AM  -CT head negative for acute intracranial abnormality, ventricle size normal, no intracranial hemorrhage  -Onset of her symptoms occurred together, facial numbness and left body hemiparesthesia. The distribution of her symptoms cannot be explained with one organic neurological cause. She does have functional transverse myelitis, but that would not explain her facial numbness.   - History of negative NMO spectrum disorder workup  - MRI cannot be done due to Interstim device for incontinence  Plan  - Midline placed  - IV solumedrol 1g x 4 days ( started 03/21/20- EOT 03/24/20)  - will need prednisone taper starting 03/25/20  - continue to monitor, neuro check q4H     Optic neuritis  Assessment & Plan  Stable chronic optic neuritis on prednisone 5mg and diamox 1000mg BID  Prednisone 5mg was discontinued given current regimen of iv solumedrol  Patient will  need prednisone taper  Patient will need to be restarted on 5mg prednisone and follow up with neurophthalmology outpatient    Glossitis  Assessment & Plan  - Erythema on tongue and roof of mouth on examination without white plaque  - Glossitis, unknown etiology, ddx iatrogenic, nutitional  - albuterol induced glossitis vs symbicor induced thrush vs seebri induced glossitis  - B12 300s 02/2020, folate WNL ( 2019) , ferritin 100s (2018)  Plan  - BMX for pain.    - Encourage oral hydration, rinse mouth after symbicort use  - discontinue seebri  -Discussed with pharmacy, she is on multiple medications that can affect her immunity for can cause rash, will keep monitoring.    Intracranial pressure increased  Assessment & Plan  -Clinically stable.   -Headache with whooshing sounds  -Patient was previously on Lasix started at dose of 20mg in 2017 for bilateral lower extremity edema, and has been on lasix for several years. Her lasix 80mg daily was discontinued upon discharge from hospital 02/25/2020.   -Patient was started on Diamox / filled diamox 02/27/20 outpatient , dose increased while patient was inpatient during this hospitalization to 1000mg BID per neurology    Plan :  Continue 1000mg BID Diamox         TONYA (obstructive sleep apnea)- (present on admission)  Assessment & Plan  Not on CPAP at home. Patient has not followed up with doctor yet for the same.   Follow up outpatient.     UTI (urinary tract infection)  Assessment & Plan  History of dysuria,  UA: Cloudy urine, nitrite positive, leukocyte esterase small, WBC 20-50, bacteria many  Urine culture: Lactose fermenting Gram negative zita   >100,000 cfu/mL -Klebsiella pneumoniae sensitive to bactrim.  Got Bactrim for 3 days  - UA 03/11/20 with trace LE, negative nitrite and mod epithelial cell, low WBC, likely skin letty contaminate. Will wait for rflx cx    DM (diabetes mellitus) ([facility])- (present on admission)  Assessment & Plan  A1C 6.0 (08/2019)  Continue home  meds.   Dulaglutide injection every Friday .   Diabetic diet.    Asthma- (present on admission)  Assessment & Plan  Continue home meds  Start montelukast and glycopyrronium bromide as per RT rec  Saturating well on room air.     Hypothyroidism- (present on admission)  Assessment & Plan  TSH 5.65  Increased Levothyroxine to 100mcg on 3/3  Continue levothyroxine, Consider TSH/ with reflex 4/3/20      Joint pain  Assessment & Plan  Right knee pain significantly improved.   Patient report slipped off bed while in sitting position and landed on her knees and right elbow  She has significant pain on right knee upon palpation or movement  Given mechanism of injury will consider conservative management with rest, ice and lidocaine patch for right knee  Xray of R knee without bony abnormality or joint effusion  PLAN  Continue Ibuprofen 400mg Q6H Prn ordered for 3 days for acute episode of pain in right knee  Conservative management

## 2020-03-22 NOTE — PROGRESS NOTES
Assumed care at 0720, received bedside report from the night RN. Patient is awake, alert and oriented x4. Patient is stable with no signs of acute distress. Fall precautions in place. Will continue to monitor.

## 2020-03-22 NOTE — CARE PLAN
Problem: Safety  Goal: Will remain free from injury  Outcome: PROGRESSING AS EXPECTED     Problem: Infection  Goal: Will remain free from infection  Outcome: PROGRESSING AS EXPECTED     Problem: Pain Management  Goal: Pain level will decrease to patient's comfort goal  Outcome: PROGRESSING AS EXPECTED     Problem: Skin Integrity  Goal: Risk for impaired skin integrity will decrease  Outcome: PROGRESSING AS EXPECTED

## 2020-03-22 NOTE — ASSESSMENT & PLAN NOTE
Stable chronic optic neuritis on prednisone 5mg and diamox 1000mg BID  Prednisone 5mg was discontinued given current short course of iv solumedrol  Patient will need prednisone taper  Patient will need to be restarted on 5mg prednisone and follow up with neurophthalmology outpatient

## 2020-03-23 PROBLEM — K14.6 GLOSSODYNIA: Status: ACTIVE | Noted: 2020-03-13

## 2020-03-23 LAB
ALBUMIN SERPL BCP-MCNC: 4.3 G/DL (ref 3.2–4.9)
ALBUMIN/GLOB SERPL: 2.4 G/DL
ALP SERPL-CCNC: 57 U/L (ref 30–99)
ALT SERPL-CCNC: 14 U/L (ref 2–50)
ANION GAP SERPL CALC-SCNC: 11 MMOL/L (ref 7–16)
AST SERPL-CCNC: 6 U/L (ref 12–45)
BILIRUB SERPL-MCNC: 0.2 MG/DL (ref 0.1–1.5)
BUN SERPL-MCNC: 12 MG/DL (ref 8–22)
CALCIUM SERPL-MCNC: 9.4 MG/DL (ref 8.5–10.5)
CHLORIDE SERPL-SCNC: 116 MMOL/L (ref 96–112)
CO2 SERPL-SCNC: 15 MMOL/L (ref 20–33)
CREAT SERPL-MCNC: 0.7 MG/DL (ref 0.5–1.4)
GLOBULIN SER CALC-MCNC: 1.8 G/DL (ref 1.9–3.5)
GLUCOSE SERPL-MCNC: 135 MG/DL (ref 65–99)
POTASSIUM SERPL-SCNC: 4 MMOL/L (ref 3.6–5.5)
PROT SERPL-MCNC: 6.1 G/DL (ref 6–8.2)
SODIUM SERPL-SCNC: 142 MMOL/L (ref 135–145)

## 2020-03-23 PROCEDURE — 700102 HCHG RX REV CODE 250 W/ 637 OVERRIDE(OP): Performed by: INTERNAL MEDICINE

## 2020-03-23 PROCEDURE — 700102 HCHG RX REV CODE 250 W/ 637 OVERRIDE(OP): Performed by: STUDENT IN AN ORGANIZED HEALTH CARE EDUCATION/TRAINING PROGRAM

## 2020-03-23 PROCEDURE — A9270 NON-COVERED ITEM OR SERVICE: HCPCS | Performed by: PSYCHIATRY & NEUROLOGY

## 2020-03-23 PROCEDURE — A9270 NON-COVERED ITEM OR SERVICE: HCPCS | Performed by: STUDENT IN AN ORGANIZED HEALTH CARE EDUCATION/TRAINING PROGRAM

## 2020-03-23 PROCEDURE — 99231 SBSQ HOSP IP/OBS SF/LOW 25: CPT | Mod: GC | Performed by: PSYCHIATRY & NEUROLOGY

## 2020-03-23 PROCEDURE — A9270 NON-COVERED ITEM OR SERVICE: HCPCS | Performed by: INTERNAL MEDICINE

## 2020-03-23 PROCEDURE — 700111 HCHG RX REV CODE 636 W/ 250 OVERRIDE (IP): Performed by: STUDENT IN AN ORGANIZED HEALTH CARE EDUCATION/TRAINING PROGRAM

## 2020-03-23 PROCEDURE — 700101 HCHG RX REV CODE 250: Performed by: INTERNAL MEDICINE

## 2020-03-23 PROCEDURE — 700111 HCHG RX REV CODE 636 W/ 250 OVERRIDE (IP): Performed by: INTERNAL MEDICINE

## 2020-03-23 PROCEDURE — 99232 SBSQ HOSP IP/OBS MODERATE 35: CPT | Mod: GC | Performed by: INTERNAL MEDICINE

## 2020-03-23 PROCEDURE — 700102 HCHG RX REV CODE 250 W/ 637 OVERRIDE(OP): Performed by: PSYCHIATRY & NEUROLOGY

## 2020-03-23 PROCEDURE — 770006 HCHG ROOM/CARE - MED/SURG/GYN SEMI*

## 2020-03-23 PROCEDURE — 80053 COMPREHEN METABOLIC PANEL: CPT

## 2020-03-23 PROCEDURE — 700105 HCHG RX REV CODE 258: Performed by: STUDENT IN AN ORGANIZED HEALTH CARE EDUCATION/TRAINING PROGRAM

## 2020-03-23 RX ORDER — KETOROLAC TROMETHAMINE 30 MG/ML
30 INJECTION, SOLUTION INTRAMUSCULAR; INTRAVENOUS
Status: COMPLETED | OUTPATIENT
Start: 2020-03-23 | End: 2020-03-23

## 2020-03-23 RX ADMIN — MELATONIN 1000 UNITS: at 05:16

## 2020-03-23 RX ADMIN — OXCARBAZEPINE 150 MG: 150 TABLET, FILM COATED ORAL at 05:15

## 2020-03-23 RX ADMIN — PREGABALIN 300 MG: 100 CAPSULE ORAL at 16:43

## 2020-03-23 RX ADMIN — OXYCODONE HYDROCHLORIDE AND ACETAMINOPHEN 1 TABLET: 5; 325 TABLET ORAL at 04:58

## 2020-03-23 RX ADMIN — TRAZODONE HYDROCHLORIDE 50 MG: 100 TABLET ORAL at 16:43

## 2020-03-23 RX ADMIN — MYCOPHENOLATE MOFETIL 1000 MG: 250 CAPSULE ORAL at 16:42

## 2020-03-23 RX ADMIN — ZIPRASIDONE HYDROCHLORIDE 40 MG: 40 CAPSULE ORAL at 16:42

## 2020-03-23 RX ADMIN — MONTELUKAST 10 MG: 10 TABLET, FILM COATED ORAL at 19:48

## 2020-03-23 RX ADMIN — LIDOCAINE HYDROCHLORIDE 5 ML: 20 SOLUTION OROPHARYNGEAL at 13:24

## 2020-03-23 RX ADMIN — MYCOPHENOLATE MOFETIL 1000 MG: 250 CAPSULE ORAL at 06:33

## 2020-03-23 RX ADMIN — CLOTRIMAZOLE: 10 CREAM TOPICAL at 05:11

## 2020-03-23 RX ADMIN — SODIUM CHLORIDE 1000 MG: 900 INJECTION, SOLUTION INTRAVENOUS at 23:59

## 2020-03-23 RX ADMIN — LEVOTHYROXINE SODIUM 100 MCG: 100 TABLET ORAL at 05:15

## 2020-03-23 RX ADMIN — CLOTRIMAZOLE: 10 CREAM TOPICAL at 16:42

## 2020-03-23 RX ADMIN — BUSPIRONE HYDROCHLORIDE 10 MG: 10 TABLET ORAL at 16:43

## 2020-03-23 RX ADMIN — ONDANSETRON 4 MG: 4 TABLET, ORALLY DISINTEGRATING ORAL at 07:50

## 2020-03-23 RX ADMIN — ACETAZOLAMIDE 1000 MG: 500 CAPSULE, EXTENDED RELEASE ORAL at 05:15

## 2020-03-23 RX ADMIN — LIDOCAINE HYDROCHLORIDE 5 ML: 20 SOLUTION OROPHARYNGEAL at 17:04

## 2020-03-23 RX ADMIN — KETOROLAC TROMETHAMINE 30 MG: 30 INJECTION, SOLUTION INTRAMUSCULAR at 18:34

## 2020-03-23 RX ADMIN — MODAFINIL 200 MG: 100 TABLET ORAL at 04:57

## 2020-03-23 RX ADMIN — ENOXAPARIN SODIUM 40 MG: 100 INJECTION SUBCUTANEOUS at 05:15

## 2020-03-23 RX ADMIN — FLUOXETINE HYDROCHLORIDE 40 MG: 20 CAPSULE ORAL at 05:16

## 2020-03-23 RX ADMIN — ACETAZOLAMIDE 1000 MG: 500 CAPSULE, EXTENDED RELEASE ORAL at 16:43

## 2020-03-23 RX ADMIN — SODIUM CHLORIDE 1000 MG: 900 INJECTION, SOLUTION INTRAVENOUS at 14:08

## 2020-03-23 RX ADMIN — ASPIRIN 81 MG: 81 TABLET, COATED ORAL at 05:15

## 2020-03-23 RX ADMIN — LIDOCAINE 1 PATCH: 50 PATCH TOPICAL at 05:16

## 2020-03-23 RX ADMIN — ZIPRASIDONE HYDROCHLORIDE 40 MG: 40 CAPSULE ORAL at 05:15

## 2020-03-23 RX ADMIN — PREGABALIN 300 MG: 100 CAPSULE ORAL at 04:57

## 2020-03-23 RX ADMIN — LIDOCAINE HYDROCHLORIDE 5 ML: 20 SOLUTION OROPHARYNGEAL at 08:19

## 2020-03-23 RX ADMIN — OXCARBAZEPINE 150 MG: 150 TABLET, FILM COATED ORAL at 16:42

## 2020-03-23 RX ADMIN — SENNOSIDES AND DOCUSATE SODIUM 2 TABLET: 8.6; 5 TABLET ORAL at 05:16

## 2020-03-23 RX ADMIN — OXYCODONE HYDROCHLORIDE AND ACETAMINOPHEN 1 TABLET: 5; 325 TABLET ORAL at 19:48

## 2020-03-23 RX ADMIN — BUSPIRONE HYDROCHLORIDE 10 MG: 10 TABLET ORAL at 05:15

## 2020-03-23 ASSESSMENT — ENCOUNTER SYMPTOMS
COUGH: 0
BRUISES/BLEEDS EASILY: 0
BLURRED VISION: 0
CONSTIPATION: 0
DIZZINESS: 0
PSYCHIATRIC NEGATIVE: 1
HEMOPTYSIS: 0
EYE PAIN: 0
TINGLING: 1
NERVOUS/ANXIOUS: 1
BLOOD IN STOOL: 0
DEPRESSION: 1
EYES NEGATIVE: 1
ABDOMINAL PAIN: 0
ORTHOPNEA: 0
WEAKNESS: 0
SPEECH CHANGE: 0
WEIGHT LOSS: 0
MYALGIAS: 0
SPUTUM PRODUCTION: 0
SORE THROAT: 0
DIARRHEA: 0
HEARTBURN: 0
HEADACHES: 0
CHILLS: 0
FOCAL WEAKNESS: 0
SHORTNESS OF BREATH: 0
MEMORY LOSS: 0
INSOMNIA: 0
CLAUDICATION: 0
VOMITING: 0
WHEEZING: 0
NECK PAIN: 0
SINUS PAIN: 0
FEVER: 0
DOUBLE VISION: 0
FOCAL WEAKNESS: 1
PALPITATIONS: 0
SEIZURES: 0
NAUSEA: 0
HALLUCINATIONS: 1
TREMORS: 0
BACK PAIN: 1
SENSORY CHANGE: 0

## 2020-03-23 ASSESSMENT — LIFESTYLE VARIABLES: SUBSTANCE_ABUSE: 0

## 2020-03-23 NOTE — PROGRESS NOTES
Daily Progress Note:     Date of Service: 3/23/2020  Primary Team: [facility] White Team   Attending: Liu Schafer M.D.   Senior Resident: Dr. Michele   Intern: Dr. Kilgore  Contact:  [phone]    Chief Complaint  Bilateral lower extremity weakness    Subjective:   -No acute events overnight, patient is clinically improving.   -Pt reports her BL LE weakness improved, and sensations intact as well.   -Tolerating increased dose of Diamox w/o any side effects.   -On IV Solumedrol 1g QD for 4 days- today day #3  -Pt reports burning sensation in tongue from past two days, now she stopped using Symbicort inhaler as this could be related her symptoms.   -She doesn't feel like eating and have only <50% of meals TID.     Consultants/Specialty:  Neuropthalmology  Neurology  Psychiatry  Dietician      Review of Systems:    Review of Systems   Constitutional: Negative for chills, fever, malaise/fatigue and weight loss.   HENT: Negative for ear discharge, ear pain, hearing loss, nosebleeds and tinnitus.    Eyes: Negative.    Respiratory: Negative for cough, hemoptysis, sputum production, shortness of breath and wheezing.    Cardiovascular: Negative for chest pain, palpitations, orthopnea, claudication and leg swelling.   Gastrointestinal: Negative for abdominal pain, diarrhea, heartburn, nausea and vomiting.   Genitourinary: Negative for dysuria, frequency and urgency.   Musculoskeletal: Positive for back pain and joint pain (BL LE weakness-chronic (improving)). Negative for myalgias and neck pain.   Skin: Negative for itching and rash.   Neurological: Negative for dizziness, tremors, sensory change, speech change, focal weakness and headaches.   Psychiatric/Behavioral: Negative.        Objective Data:   Physical Exam:   Vitals:   Temp:  [36.2 °C (97.1 °F)-36.4 °C (97.5 °F)] 36.2 °C (97.2 °F)  Pulse:  [76-80] 76  Resp:  [16-18] 16  BP: (102-139)/(53-80) 102/53  SpO2:  [93 %-96 %] 96 %    Physical Exam  Vitals signs and nursing note  reviewed.   Constitutional:       General: She is not in acute distress.     Appearance: Normal appearance. She is obese. She is not toxic-appearing.   HENT:      Head: Normocephalic and atraumatic.      Mouth/Throat:      Mouth: Mucous membranes are moist.      Pharynx: Oropharynx is clear. No oropharyngeal exudate or posterior oropharyngeal erythema.   Neck:      Musculoskeletal: Normal range of motion.   Cardiovascular:      Rate and Rhythm: Normal rate and regular rhythm.      Pulses: Normal pulses.      Heart sounds: Normal heart sounds.   Pulmonary:      Effort: Pulmonary effort is normal. No respiratory distress.      Breath sounds: Normal breath sounds.   Abdominal:      General: There is no distension.      Tenderness: There is no abdominal tenderness.   Musculoskeletal: Normal range of motion.         General: No swelling or tenderness.      Right lower leg: No edema.      Left lower leg: No edema.   Neurological:      Mental Status: She is alert and oriented to person, place, and time.      Motor: Motor function is intact.      Comments: Mental Status: She is alert and oriented to person, place, and time. Mental status is at baseline.    RUE/LUE 4/5 strength  LLE did not appreciate patellar reflex, chronic weakness, cannot raise against gravity  RLE patellar reflex intact, chronic weakness, cannot raise against gravity           Labs:   Lab Results   Component Value Date/Time    SODIUM 142 03/23/2020 05:10 AM    POTASSIUM 4.0 03/23/2020 05:10 AM    CHLORIDE 116 (H) 03/23/2020 05:10 AM    CO2 15 (L) 03/23/2020 05:10 AM    GLUCOSE 135 (H) 03/23/2020 05:10 AM    BUN 12 03/23/2020 05:10 AM    CREATININE 0.70 03/23/2020 05:10 AM    CREATININE 0.75 (L) 07/20/2010 11:00 AM    BUNCREATRAT 19 07/20/2010 11:00 AM    GLOMRATE >59 07/20/2010 11:00 AM      Imaging:   There are no questions and answers to display.         * Leg weakness, bilateral  Assessment & Plan  -Patient caregiver states she is unable to provide  safe transfers between chair and bed  -PT states there is no more equipment they can provide to assist with it  -It is likely that patient will require long term care facility placement  -CT spine ruled out any acute pathology   -Patient unable to undergo MRI because of Stimulator in place.     3/23  #Neurology  Recommendation:  -Continues IV Solu-Medrol 1 g daily for 4 days - 3/21-3/24 plus prednisone taper starting 03/25/20  -Aggressive PT/OT-= consult placed  -Q4 neuro checks.   -Follow outpatient neurology   -Appreciate neurology recommendations.    #Neuropthalmology recommendation: continue med :   -Continue  Diamox  1000 mg p.o. b.i.d.  -Maintain the CellCept 1000 mg p.o. b.i.d.  -Follow up outpatient in neurophthalmology clinic.    #Psychiatric consult, continue psychiatric medication    -Pending placement      Numbness  Assessment & Plan  -Numbness in left side of face and left side of body started 03/19/20 AM, progressed to right sided numbness and weakness 03/20/20 AM  -CT head negative for acute intracranial abnormality, ventricle size normal, no intracranial hemorrhage  -Onset of her symptoms occurred together, facial numbness and left body hemiparesthesia. The distribution of her symptoms cannot be explained with one organic neurological cause. She does have functional transverse myelitis, but that would not explain her facial numbness.   - History of negative NMO spectrum disorder workup  - MRI cannot be done due to Interstim device for incontinence    3/23  -Improving/ Resolving with IV solumedrol   -Continue IV solumedrol 1g x 4 days - 03/21/20-03/24/20  -Prednisone taper starting 03/25/20  -Neuro check q4H     Optic neuritis  Assessment & Plan  Stable chronic optic neuritis on prednisone 5mg and diamox 1000mg BID  Prednisone 5mg was discontinued given current short course of iv solumedrol  Patient will need prednisone taper  Patient will need to be restarted on 5mg prednisone and follow up with  neurophthalmology outpatient    Glossodynia  Assessment & Plan  -Patient c/o burning sensation of tongue, due to this she doesn't feel like eating.   -No Erythema on tongue and roof of mouth on examination , neither any white plaque noted.   - Unknown etiology, ddx iatrogenic, nutitional  - albuterol induced vs symbicor induced   - B12 300s 02/2020, folate WNL ( 2019) , ferritin 100s (2018)  Plan  - Continue using BMX before meal,   - Encourage oral hydration, rinse mouth after symbicort use  - Discontinue seebri  -Discussed with pharmacy, she is on multiple medications that can affect her immunity for can cause these symptoms, will keep monitoring.    Intracranial pressure increased  Assessment & Plan  -Clinically improving.  -Patient was previously on Lasix started at dose of 20mg in 2017 for bilateral lower extremity edema, and has been on lasix for several years. Her lasix 80mg daily was discontinued upon discharge from hospital 02/25/2020.   -Patient was started on Diamox / filled diamox 02/27/20 outpatient , dose increased while patient was inpatient during this hospitalization to 1000mg BID per neurology    Plan :  Continue 1000mg BID Diamox         TONYA (obstructive sleep apnea)- (present on admission)  Assessment & Plan  Not on CPAP at home. Patient has not followed up with doctor yet for the same.   Follow up outpatient.     DM (diabetes mellitus) ([facility])- (present on admission)  Assessment & Plan  A1C 6.0 (08/2019)  Continue home meds.   Dulaglutide injection every Friday .   Diabetic diet.    Joint pain  Assessment & Plan  Right knee pain significantly improved.   Patient report slipped off bed while in sitting position and landed on her knees and right elbow  She has significant pain on right knee upon palpation or movement  Given mechanism of injury will consider conservative management with rest, ice and lidocaine patch for right knee  Xray of R knee without bony abnormality or joint  effusion    PLAN  Continue Tylenol Q6H Prn   IV Ketorolac 30mg once  PRN-if Tylenol doesn't help w/ pain control. no hx of GI bleed, Renal fnx WNL  Conservative management    Asthma- (present on admission)  Assessment & Plan  Continue home meds  Start montelukast and glycopyrronium bromide as per RT rec  Saturating well on room air.     Hypothyroidism- (present on admission)  Assessment & Plan  TSH 5.65  Increased Levothyroxine to 100mcg on 3/3  Continue levothyroxine, Consider TSH/ with reflex 4/3/20      UTI (urinary tract infection)  Assessment & Plan  History of dysuria,  UA: Cloudy urine, nitrite positive, leukocyte esterase small, WBC 20-50, bacteria many  Urine culture: Lactose fermenting Gram negative zita   >100,000 cfu/mL -Klebsiella pneumoniae sensitive to bactrim.  Got Bactrim for 3 days  - UA 03/11/20 with trace LE, negative nitrite and mod epithelial cell, low WBC, likely skin letty contaminate. Will wait for rflx cx

## 2020-03-23 NOTE — CARE PLAN
Problem: Safety  Goal: Will remain free from injury  Outcome: PROGRESSING AS EXPECTED  Note: Reviewed patient's mobility status, discussed with care team of patient needs, verifying appropriate safety precautions in place, providing patient education, ensuring call lights are within reach, non-slip socks in use, evaluating needs alarms & monitoring every shift, continuing with current plan of care.       Problem: Knowledge Deficit  Goal: Knowledge of disease process/condition, treatment plan, diagnostic tests, and medications will improve  Outcome: PROGRESSING AS EXPECTED  Note: Educated patient about current POC, activities, encouraging patient to ask questions regarding care plan, providing answers to patient's questions, educating patient about medications, encouraging patient involvement in care process. Continuing with current POC.

## 2020-03-23 NOTE — DISCHARGE PLANNING
Agency/Facility Name: [facility]  Spoke To: Admissions  Outcome: Pt's financials are being reviewed and will likely be accepted. Inquired about d/c date.    AUGUST [place] notified    [id number]  Agency/Facility Name: [facility]  Outcome: Left vmail, requested call back.

## 2020-03-23 NOTE — PSYCHIATRY
"PSYCHIATRIC FOLLOW UP:      Reason for admission: Bilateral leg numbness following a fall   Reason for consult:\"Conversion Disorder\"   Requesting Physician: Kadeem Alvarenga M.D.  Supervising attending: Dr. Unique HOOPER  Source of information: Chart, patient        HPI:    Patient is a 29 year old female with history of schizoaffective disorder, depression, EVELIA, and borderline personality disorder who presented to the ED after a ground level fall. Patient reports \"exacerbation of transverse myelitis.\"       On evaluation, patient was resting in bed.  Patient says she is adherent to her medications with good effect.  Denies any side effects.  Patient endorses auditory hallucinations coming from the nurses station.  She says the voices are derogatory but now she is just being 'paranoid'.  Denies visual hallucinations.  Patient continues to deny any suicidal or homicidal ideations.  Patient is eating/voiding with concern.  Patient denies any medical issues besides, being unable to walk.  Patient states she has been transferring to her wheelchair and ambulates through the halls.  Patient reports she is still depressed and is going stir crazy here in the hospital.  She says she just stares at the wall all day.  She has low energy, low mood, poor concentration, increased appetite and increased sleep.  Patient states once medically clear she wants to discharge back to [facility].  She states she understands the risks and benefits of going back to [facility] but, asked this time with everything shut down she does not want to stay in the hospital for many months looking for housing.  Patient states she has been rejected from most SNFs.    Per nursing, no behavioral outbursts.  Patient acting appropriate.  No changes.      Review of Systems:  Review of Systems   Constitutional: Negative for chills and fever.   HENT: Negative for ear discharge, hearing loss, sinus pain and sore throat.    Eyes: Negative for blurred vision, " "double vision and pain.   Respiratory: Negative for cough, hemoptysis and sputum production.    Cardiovascular: Negative for chest pain, palpitations and orthopnea.   Gastrointestinal: Negative for blood in stool, constipation, heartburn and vomiting.   Genitourinary: Negative for dysuria, frequency and urgency.   Musculoskeletal: Positive for joint pain. Negative for myalgias and neck pain.   Skin: Negative for rash.   Neurological: Positive for tingling and focal weakness. Negative for dizziness, tremors, speech change, seizures, weakness and headaches.   Endo/Heme/Allergies: Does not bruise/bleed easily.   Psychiatric/Behavioral: Positive for depression and hallucinations. Negative for memory loss, substance abuse and suicidal ideas. The patient is nervous/anxious. The patient does not have insomnia.         Psychiatric Examination: observed phenomenon:  Vitals: /53   Pulse 76   Temp 36.2 °C (97.2 °F) (Temporal)   Resp 16   Ht 1.651 m (5' 5\")   Wt (!) 127.8 kg (281 lb 12 oz)   SpO2 96%   BMI 46.89 kg/m²  Body mass index is 46.89 kg/m².    Appearance: 30 y/o obese white female, with dark hair, in facility clothing, resting in bed. Moderate hygiene  Muscle Strength/Tone:  Weakness bilateral lower extremities  Gait/Station: Patient ambulates with assistance. She uses a wheelchair.  She does not walk at this time  Has trouble transferring  Speech: No stutter noted. Reg. Rate/tone/volume  Thought Process: Linear, future oriented. No derailment  Abnormal/Psychotic Thoughts (ex):  Patient has history of psychosis.  Patient endorses auditory hallucinations which are 'coming from the nursing station and derogatory'.   Insight/Judgement: Limited/Limited  Orientation: A&O X4  Memory: Inatct  Attention/Concentration: Intact  Language: Fluent   Fund of Knowledge: Appropriate   Mood: \"depressed            Affect:  Dysthymic. Congruent with stated mood         SI/HI: Denies SI/HI  Neurological Testing:( ie clock, " cube drawing, MMSE, MOCA,etc.) Not formally tested    Soc history:   Patient lives with her grandmother and aunt in [place]. She is unemployed and collects SSI $790/month. Single, w/o kids. 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 Her mom and sister are also in [place].      Lab results/tests:   Recent Results (from the past 48 hour(s))   CBC WITH DIFFERENTIAL    Collection Time: 03/22/20  5:44 AM   Result Value Ref Range    WBC 4.7 (L) 4.8 - 10.8 K/uL    RBC 4.33 4.20 - 5.40 M/uL    Hemoglobin 13.2 12.0 - 16.0 g/dL    Hematocrit 39.3 37.0 - 47.0 %    MCV 90.8 81.4 - 97.8 fL    MCH 30.5 27.0 - 33.0 pg    MCHC 33.6 33.6 - 35.0 g/dL    RDW 44.2 35.9 - 50.0 fL    Platelet Count 175 164 - 446 K/uL    MPV 11.6 9.0 - 12.9 fL    Neutrophils-Polys 88.40 (H) 44.00 - 72.00 %    Lymphocytes 9.90 (L) 22.00 - 41.00 %    Monocytes 1.10 0.00 - 13.40 %    Eosinophils 0.00 0.00 - 6.90 %    Basophils 0.00 0.00 - 1.80 %    Immature Granulocytes 0.60 0.00 - 0.90 %    Nucleated RBC 0.00 /100 WBC    Neutrophils (Absolute) 4.13 2.00 - 7.15 K/uL    Lymphs (Absolute) 0.46 (L) 1.00 - 4.80 K/uL    Monos (Absolute) 0.05 0.00 - 0.85 K/uL    Eos (Absolute) 0.00 0.00 - 0.51 K/uL    Baso (Absolute) 0.00 0.00 - 0.12 K/uL    Immature Granulocytes (abs) 0.03 0.00 - 0.11 K/uL    NRBC (Absolute) 0.00 K/uL   Comp Metabolic Panel    Collection Time: 03/22/20  5:44 AM   Result Value Ref Range    Sodium 138 135 - 145 mmol/L    Potassium 3.5 (L) 3.6 - 5.5 mmol/L    Chloride 111 96 - 112 mmol/L    Co2 14 (L) 20 - 33 mmol/L    Anion Gap 13.0 7.0 - 16.0    Glucose 181 (H) 65 - 99 mg/dL    Bun 9 8 - 22 mg/dL    Creatinine 0.67 0.50 - 1.40 mg/dL    Calcium 9.4 8.5 - 10.5 mg/dL    AST(SGOT) 8 (L) 12 - 45 U/L    ALT(SGPT) 16 2 - 50 U/L    Alkaline Phosphatase 61 30 - 99 U/L    Total Bilirubin 0.2 0.1 - 1.5 mg/dL    Albumin 4.2 3.2 - 4.9 g/dL    Total Protein 6.3 6.0 - 8.2 g/dL    Globulin 2.1 1.9 - 3.5 g/dL    A-G Ratio 2.0 g/dL   ESTIMATED GFR    Collection Time:  03/22/20  5:44 AM   Result Value Ref Range    GFR If African American >60 >60 mL/min/1.73 m 2    GFR If Non African American >60 >60 mL/min/1.73 m 2   Comp Metabolic Panel    Collection Time: 03/23/20  5:10 AM   Result Value Ref Range    Sodium 142 135 - 145 mmol/L    Potassium 4.0 3.6 - 5.5 mmol/L    Chloride 116 (H) 96 - 112 mmol/L    Co2 15 (L) 20 - 33 mmol/L    Anion Gap 11.0 7.0 - 16.0    Glucose 135 (H) 65 - 99 mg/dL    Bun 12 8 - 22 mg/dL    Creatinine 0.70 0.50 - 1.40 mg/dL    Calcium 9.4 8.5 - 10.5 mg/dL    AST(SGOT) 6 (L) 12 - 45 U/L    ALT(SGPT) 14 2 - 50 U/L    Alkaline Phosphatase 57 30 - 99 U/L    Total Bilirubin 0.2 0.1 - 1.5 mg/dL    Albumin 4.3 3.2 - 4.9 g/dL    Total Protein 6.1 6.0 - 8.2 g/dL    Globulin 1.8 (L) 1.9 - 3.5 g/dL    A-G Ratio 2.4 g/dL   ESTIMATED GFR    Collection Time: 03/23/20  5:10 AM   Result Value Ref Range    GFR If African American >60 >60 mL/min/1.73 m 2    GFR If Non African American >60 >60 mL/min/1.73 m 2     IR-MIDLINE CATHETER INSERTION WO GUIDANCE > AGE 3   Final Result                  Ultrasound-guided midline placement performed by qualified nursing staff    as above.          CT-HEAD W/O   Final Result      No evidence of acute intracranial process.      DX-KNEE 2- RIGHT   Final Result      1.  No acute findings or significant arthropathy identified. There is mild narrowing of the medial compartment      US-PELVIC COMPLETE (TRANSABDOMINAL/TRANSVAGINAL) (COMBO)   Final Result         Redemonstration of 3.7 x 2.8 cm cyst in the right ovary, slightly smaller than prior. No surrounding fluid to suggest rupture.      Debris in the urinary bladder. Correlate with UA.      IR-US GUIDED PIV   Final Result    Ultrasound-guided PERIPHERAL IV INSERTION performed by    qualified nursing staff as above.            US-JOSHUA SINGLE LEVEL BILAT   Final Result      DX-ANKLE 3+ VIEWS LEFT   Final Result         1.  No acute traumatic bony injury.         DX-ANKLE 3+ VIEWS RIGHT   Final  Result         1.  No acute traumatic bony injury.         CT-HEAD W/O   Final Result         1.  No acute intracranial abnormality.      CT-LSPINE W/O PLUS RECONS   Final Result         1.  No acute traumatic bony injury of the lumbar spine.   2.  Punctate left renal calculus without visualized obstructive changes.               Assessment:  Miss. Balderrama is a 28 y/o white female with history of schizoaffective disorder, depression, EVELIA, and borderline personality disorder who presented to the ED after a ground level fall.  Patient continues to display functional neurological disorder with weakness. Patient continues to be significantly depressed. Does not meet criteria for legal hold at this time.     Psych:   1.) Schizoaffective disorder  - Chronic auditory hallucinations with paranoia     2.) Functional Neurological Disorder With weakness   - hx of conversion dz    3.) Borderline personality disorder  - hx of cutting, instability of relationships, unstable self image, stress related paranoia     4.) Generalized anxiety disorder       Medical:   -History of transverse myelitis  -Chronic pain syndrome  -GERD  -Diabetes mellitus type 2  -Obstructive sleep apnea  -Hypothyroidism, on Synthroid  -Increased intracranial pressure       PLAN:  NO changes  1- Legal hold: N/A  2- Meds:  -Continue modafinil 200mg PO qam for somnolence.  -Continue BuSpar 10 mg twice daily for anxiety  -Continue fluoxetine 40 mg daily for mood.  -Continue Trileptal 150 mg twice daily for mood  -Continue trazodone 50 mg nightly for insomnia  -Continue Geodon 40 mg twice daily for psychosis  3- PRNs:     None  4- Dispo: Defer to medical team  5- Will follow       Thank you for the Consult.        Reviewed risks, benefits, alternatives to include no treatment, therapy and medications     Patient seen on rounds with Dr. Calhoun, attending psychiatrist, and treatment plan was discussed.

## 2020-03-23 NOTE — CARE PLAN
Problem: Safety  Goal: Will remain free from injury  Outcome: PROGRESSING AS EXPECTED     Problem: Urinary Elimination:  Goal: Ability to reestablish a normal urinary elimination pattern will improve  Outcome: PROGRESSING AS EXPECTED     Problem: Pain Management  Goal: Pain level will decrease to patient's comfort goal  Outcome: PROGRESSING AS EXPECTED

## 2020-03-24 VITALS
BODY MASS INDEX: 46.94 KG/M2 | WEIGHT: 281.75 LBS | TEMPERATURE: 97.2 F | SYSTOLIC BLOOD PRESSURE: 126 MMHG | HEART RATE: 62 BPM | OXYGEN SATURATION: 92 % | RESPIRATION RATE: 16 BRPM | DIASTOLIC BLOOD PRESSURE: 64 MMHG | HEIGHT: 65 IN

## 2020-03-24 PROBLEM — R29.898 LEG WEAKNESS, BILATERAL: Status: RESOLVED | Noted: 2017-02-22 | Resolved: 2020-03-24

## 2020-03-24 PROBLEM — K14.6 GLOSSODYNIA: Status: RESOLVED | Noted: 2020-03-13 | Resolved: 2020-03-24

## 2020-03-24 PROBLEM — R20.0 NUMBNESS: Status: RESOLVED | Noted: 2020-03-19 | Resolved: 2020-03-24

## 2020-03-24 PROBLEM — G93.2 INTRACRANIAL PRESSURE INCREASED: Status: RESOLVED | Noted: 2020-02-27 | Resolved: 2020-03-24

## 2020-03-24 PROCEDURE — A9270 NON-COVERED ITEM OR SERVICE: HCPCS | Performed by: STUDENT IN AN ORGANIZED HEALTH CARE EDUCATION/TRAINING PROGRAM

## 2020-03-24 PROCEDURE — 700111 HCHG RX REV CODE 636 W/ 250 OVERRIDE (IP): Performed by: INTERNAL MEDICINE

## 2020-03-24 PROCEDURE — 700102 HCHG RX REV CODE 250 W/ 637 OVERRIDE(OP): Performed by: STUDENT IN AN ORGANIZED HEALTH CARE EDUCATION/TRAINING PROGRAM

## 2020-03-24 PROCEDURE — 700102 HCHG RX REV CODE 250 W/ 637 OVERRIDE(OP): Performed by: PSYCHIATRY & NEUROLOGY

## 2020-03-24 PROCEDURE — 700102 HCHG RX REV CODE 250 W/ 637 OVERRIDE(OP): Performed by: INTERNAL MEDICINE

## 2020-03-24 PROCEDURE — 700101 HCHG RX REV CODE 250: Performed by: INTERNAL MEDICINE

## 2020-03-24 PROCEDURE — 700111 HCHG RX REV CODE 636 W/ 250 OVERRIDE (IP): Performed by: STUDENT IN AN ORGANIZED HEALTH CARE EDUCATION/TRAINING PROGRAM

## 2020-03-24 PROCEDURE — 99239 HOSP IP/OBS DSCHRG MGMT >30: CPT | Mod: GC | Performed by: INTERNAL MEDICINE

## 2020-03-24 PROCEDURE — A9270 NON-COVERED ITEM OR SERVICE: HCPCS | Performed by: PSYCHIATRY & NEUROLOGY

## 2020-03-24 PROCEDURE — A9270 NON-COVERED ITEM OR SERVICE: HCPCS | Performed by: INTERNAL MEDICINE

## 2020-03-24 RX ORDER — ACETAZOLAMIDE 250 MG/1
TABLET ORAL
Qty: 90 TAB | Refills: 2 | Status: SHIPPED
Start: 2020-03-24 | End: 2020-04-20

## 2020-03-24 RX ORDER — MODAFINIL 200 MG/1
200 TABLET ORAL EVERY MORNING
Qty: 30 TAB | Refills: 1 | Status: SHIPPED | OUTPATIENT
Start: 2020-03-25 | End: 2020-03-24

## 2020-03-24 RX ORDER — MODAFINIL 200 MG/1
200 TABLET ORAL EVERY MORNING
Qty: 30 TAB | Refills: 1 | Status: SHIPPED | OUTPATIENT
Start: 2020-03-25 | End: 2020-05-24

## 2020-03-24 RX ORDER — LEVOTHYROXINE SODIUM 0.1 MG/1
100 TABLET ORAL
Qty: 30 TAB | Refills: 2 | Status: SHIPPED
Start: 2020-03-25 | End: 2020-08-15

## 2020-03-24 RX ADMIN — MELATONIN 1000 UNITS: at 04:34

## 2020-03-24 RX ADMIN — ENOXAPARIN SODIUM 40 MG: 100 INJECTION SUBCUTANEOUS at 04:35

## 2020-03-24 RX ADMIN — ASPIRIN 81 MG: 81 TABLET, COATED ORAL at 04:33

## 2020-03-24 RX ADMIN — BUSPIRONE HYDROCHLORIDE 10 MG: 10 TABLET ORAL at 04:33

## 2020-03-24 RX ADMIN — ZIPRASIDONE HYDROCHLORIDE 40 MG: 40 CAPSULE ORAL at 04:34

## 2020-03-24 RX ADMIN — SENNOSIDES AND DOCUSATE SODIUM 2 TABLET: 8.6; 5 TABLET ORAL at 04:34

## 2020-03-24 RX ADMIN — LIDOCAINE HYDROCHLORIDE 5 ML: 20 SOLUTION OROPHARYNGEAL at 13:40

## 2020-03-24 RX ADMIN — ACETAZOLAMIDE 1000 MG: 500 CAPSULE, EXTENDED RELEASE ORAL at 04:34

## 2020-03-24 RX ADMIN — FLUOXETINE HYDROCHLORIDE 40 MG: 20 CAPSULE ORAL at 04:33

## 2020-03-24 RX ADMIN — MODAFINIL 200 MG: 100 TABLET ORAL at 04:34

## 2020-03-24 RX ADMIN — MYCOPHENOLATE MOFETIL 1000 MG: 250 CAPSULE ORAL at 04:34

## 2020-03-24 RX ADMIN — LIDOCAINE 1 PATCH: 50 PATCH TOPICAL at 04:35

## 2020-03-24 RX ADMIN — LIDOCAINE HYDROCHLORIDE 5 ML: 20 SOLUTION OROPHARYNGEAL at 09:52

## 2020-03-24 RX ADMIN — PREGABALIN 300 MG: 100 CAPSULE ORAL at 04:34

## 2020-03-24 RX ADMIN — ACETAMINOPHEN 650 MG: 325 TABLET, FILM COATED ORAL at 07:21

## 2020-03-24 RX ADMIN — ONDANSETRON 4 MG: 4 TABLET, ORALLY DISINTEGRATING ORAL at 06:33

## 2020-03-24 RX ADMIN — CLOTRIMAZOLE: 10 CREAM TOPICAL at 04:35

## 2020-03-24 RX ADMIN — LEVOTHYROXINE SODIUM 100 MCG: 100 TABLET ORAL at 04:34

## 2020-03-24 RX ADMIN — OXCARBAZEPINE 150 MG: 150 TABLET, FILM COATED ORAL at 04:34

## 2020-03-24 RX ADMIN — OXYCODONE HYDROCHLORIDE AND ACETAMINOPHEN 1 TABLET: 5; 325 TABLET ORAL at 04:35

## 2020-03-24 NOTE — DISCHARGE PLANNING
Received Transport Form @ 1240  Spoke to Zander STALLWORTH    Transport is scheduled for 3/24 @1400 going to [place].    Rec'd MTM auth from [facility] notified    Agency/Facility Name: [facility]  Spoke To: Marylou  Outcome: Confirmed transport time of 1400; CCA manually faxed d/c summary and MAR report to [phone]

## 2020-03-24 NOTE — PSYCHIATRY
BRIEF PSYCHIATRIC CONSULT NOTE: patient seen, full note to follow.  Continues to be depressed, anhedonic.     Dx:  Functional Neurological Disorder With weakness   -Schizoaffective disorder  -Generalized anxiety  -Borderline personality disorder     Medical:   -History of transverse myelitis  -Chronic pain syndrome  -GERD  -Diabetes mellitus type 2  -Obstructive sleep apnea  -Hypothyroidism, on Synthroid  -Increased intracranial pressure    Plan: continue current regimen

## 2020-03-24 NOTE — DISCHARGE PLANNING
Anticipated Discharge Disposition: SNF    Action: RN CM completed transfer packet and placed on chart. Pt and bedside RN notified. Pt notified her grandmother of transfer.     Barriers to Discharge: None    Plan: Discharge to [facility] at 1400.

## 2020-03-24 NOTE — CARE PLAN
Problem: Safety  Goal: Will remain free from injury  Outcome: PROGRESSING AS EXPECTED  Goal: Will remain free from falls  Outcome: PROGRESSING AS EXPECTED     Problem: Skin Integrity  Goal: Risk for impaired skin integrity will decrease  Outcome: PROGRESSING AS EXPECTED     Problem: Discharge Barriers/Planning  Goal: Patient's continuum of care needs will be met  Outcome: PROGRESSING SLOWER THAN EXPECTED     Problem: Urinary Elimination:  Goal: Ability to reestablish a normal urinary elimination pattern will improve  Outcome: PROGRESSING SLOWER THAN EXPECTED

## 2020-03-24 NOTE — DISCHARGE PLANNING
Agency/Facility Name: [facility]  Spoke To: Marylou  Outcome: Need PASRR to be faxed    RNCM [place] notified

## 2020-03-24 NOTE — DISCHARGE INSTRUCTIONS
Discharge Instructions    Discharged to other by medical transportation with escort. Discharged via wheelchair, hospital escort: Yes.  Special equipment needed: Wheelchair    Be sure to schedule a follow-up appointment with your primary care doctor or any specialists as instructed.     Discharge Plan:   Influenza Vaccine Indication: Not indicated: Previously immunized this influenza season and > 8 years of age    I understand that a diet low in cholesterol, fat, and sodium is recommended for good health. Unless I have been given specific instructions below for another diet, I accept this instruction as my diet prescription.    Special Instructions: None    · Is patient discharged on Warfarin / Coumadin?   No     Depression / Suicide Risk    As you are discharged from this [facility] facility, it is important to learn how to keep safe from harming yourself.    Recognize the warning signs:  · Abrupt changes in personality, positive or negative- including increase in energy   · Giving away possessions  · Change in eating patterns- significant weight changes-  positive or negative  · Change in sleeping patterns- unable to sleep or sleeping all the time   · Unwillingness or inability to communicate  · Depression  · Unusual sadness, discouragement and loneliness  · Talk of wanting to die  · Neglect of personal appearance   · Rebelliousness- reckless behavior  · Withdrawal from people/activities they love  · Confusion- inability to concentrate     If you or a loved one observes any of these behaviors or has concerns about self-harm, here's what you can do:  · Talk about it- your feelings and reasons for harming yourself  · Remove any means that you might use to hurt yourself (examples: pills, rope, extension cords, firearm)  · Get professional help from the community (Mental Health, Substance Abuse, psychological counseling)  · Do not be alone:Call your Safe Contact- someone whom you trust who will be there for  you.  · Call your local CRISIS HOTLINE 256-3112 or 846-641-3198  · Call your local Children's Mobile Crisis Response Team [place] (381) 961-5961 or www.[employer]  · Call the toll free National Suicide Prevention Hotlines   · National Suicide Prevention Lifeline 617-367-KGAS (4603)  · National Hope Line Network 800-SUICIDE (661-6280)            Obesity, Adult  Introduction  Obesity is having too much body fat. If you have a BMI of 30 or more, you are obese. BMI is a number that explains how much body fat you have. Obesity is often caused by taking in (consuming) more calories than your body uses.  Obesity can cause serious health problems. Changing your lifestyle can help to treat obesity.  Follow these instructions at home:  Eating and drinking  · Follow advice from your doctor about what to eat and drink. Your doctor may tell you to:  ¨ Cut down on (limit) fast foods, sweets, and processed snack foods.  ¨ Choose low-fat options. For example, choose low-fat milk instead of whole milk.  ¨ Eat 5 or more servings of fruits or vegetables every day.  ¨ Eat at home more often. This gives you more control over what you eat.  ¨ Choose healthy foods when you eat out.  ¨ Learn what a healthy portion size is. A portion size is the amount of a certain food that is healthy for you to eat at one time. This is different for each person.  ¨ Keep low-fat snacks available.  ¨ Avoid sugary drinks. These include soda, fruit juice, iced tea that is sweetened with sugar, and flavored milk.  ¨ Eat a healthy breakfast.  · Drink enough water to keep your pee (urine) clear or pale yellow.  · Do not go without eating for long periods of time (do not fast).  · Do not go on popular or trendy diets (fad diets).  Physical Activity  · Exercise often, as told by your doctor. Ask your doctor:  ¨ What types of exercise are safe for you.  ¨ How often you should exercise.  · Warm up and stretch before being active.  · Do slow stretching  after being active (cool down).  · Rest between times of being active.  Lifestyle  · Limit how much time you spend in front of your TV, computer, or video game system (be less sedentary).  · Find ways to reward yourself that do not involve food.  · Limit alcohol intake to no more than 1 drink a day for nonpregnant women and 2 drinks a day for men. One drink equals 12 oz of beer, 5 oz of wine, or 1½ oz of hard liquor.  General instructions  · Keep a weight loss journal. This can help you keep track of:  ¨ The food that you eat.  ¨ The exercise that you do.  · Take over-the-counter and prescription medicines only as told by your doctor.  · Take vitamins and supplements only as told by your doctor.  · Think about joining a support group. Your doctor may be able to help with this.  · Keep all follow-up visits as told by your doctor. This is important.  Contact a doctor if:  · You cannot meet your weight loss goal after you have changed your diet and lifestyle for 6 weeks.  This information is not intended to replace advice given to you by your health care provider. Make sure you discuss any questions you have with your health care provider.  Document Released: 03/11/2013 Document Revised: 05/25/2017 Document Reviewed: 10/05/2016  © 2017 Elsevier      Obesity, Adult  Obesity is the condition of having too much total body fat. Being overweight or obese means that your weight is greater than what is considered healthy for your body size. Obesity is determined by a measurement called BMI. BMI is an estimate of body fat and is calculated from height and weight. For adults, a BMI of 30 or higher is considered obese.  Obesity can eventually lead to other health concerns and major illnesses, including:  · Stroke.  · Coronary artery disease (CAD).  · Type 2 diabetes.  · Some types of cancer, including cancers of the colon, breast, uterus, and gallbladder.  · Osteoarthritis.  · High blood pressure (hypertension).  · High  cholesterol.  · Sleep apnea.  · Gallbladder stones.  · Infertility problems.  What are the causes?  The main cause of obesity is taking in (consuming) more calories than your body uses for energy. Other factors that contribute to this condition may include:  · Being born with genes that make you more likely to become obese.  · Having a medical condition that causes obesity. These conditions include:  ¨ Hypothyroidism.  ¨ Polycystic ovarian syndrome (PCOS).  ¨ Binge-eating disorder.  ¨ Cushing syndrome.  · Taking certain medicines, such as steroids, antidepressants, and seizure medicines.  · Not being physically active (sedentary lifestyle).  · Living where there are limited places to exercise safely or buy healthy foods.  · Not getting enough sleep.  What increases the risk?  The following factors may increase your risk of this condition:  · Having a family history of obesity.  · Being a woman of -American descent.  · Being a man of  descent.  What are the signs or symptoms?  Having excessive body fat is the main symptom of this condition.  How is this diagnosed?  This condition may be diagnosed based on:  · Your symptoms.  · Your medical history.  · A physical exam. Your health care provider may measure:  ¨ Your BMI. If you are an adult with a BMI between 25 and less than 30, you are considered overweight. If you are an adult with a BMI of 30 or higher, you are considered obese.  ¨ The distances around your hips and your waist (circumferences). These may be compared to each other to help diagnose your condition.  ¨ Your skinfold thickness. Your health care provider may gently pinch a fold of your skin and measure it.  How is this treated?  Treatment for this condition often includes changing your lifestyle. Treatment may include some or all of the following:  · Dietary changes. Work with your health care provider and a dietitian to set a weight-loss goal that is healthy and reasonable for you.  Dietary changes may include eating:  ¨ Smaller portions. A portion size is the amount of a particular food that is healthy for you to eat at one time. This varies from person to person.  ¨ Low-calorie or low-fat options.  ¨ More whole grains, fruits, and vegetables.  · Regular physical activity. This may include aerobic activity (cardio) and strength training.  · Medicine to help you lose weight. Your health care provider may prescribe medicine if you are unable to lose 1 pound a week after 6 weeks of eating more healthily and doing more physical activity.  · Surgery. Surgical options may include gastric banding and gastric bypass. Surgery may be done if:  ¨ Other treatments have not helped to improve your condition.  ¨ You have a BMI of 40 or higher.  ¨ You have life-threatening health problems related to obesity.  Follow these instructions at home:     Eating and drinking  · Follow recommendations from your health care provider about what you eat and drink. Your health care provider may advise you to:  ¨ Limit fast foods, sweets, and processed snack foods.  ¨ Choose low-fat options, such as low-fat milk instead of whole milk.  ¨ Eat 5 or more servings of fruits or vegetables every day.  ¨ Eat at home more often. This gives you more control over what you eat.  ¨ Choose healthy foods when you eat out.  ¨ Learn what a healthy portion size is.  ¨ Keep low-fat snacks on hand.  ¨ Avoid sugary drinks, such as soda, fruit juice, iced tea sweetened with sugar, and flavored milk.  ¨ Eat a healthy breakfast.  · Drink enough water to keep your urine clear or pale yellow.  · Do not go without eating for long periods of time (do not fast) or follow a fad diet. Fasting and fad diets can be unhealthy and even dangerous.  Physical Activity  · Exercise regularly, as told by your health care provider. Ask your health care provider what types of exercise are safe for you and how often you should exercise.  · Warm up and stretch  before being active.  · Cool down and stretch after being active.  · Rest between periods of activity.  Lifestyle  · Limit the time that you spend in front of your TV, computer, or video game system.  · Find ways to reward yourself that do not involve food.  · Limit alcohol intake to no more than 1 drink a day for nonpregnant women and 2 drinks a day for men. One drink equals 12 oz of beer, 5 oz of wine, or 1½ oz of hard liquor.  General instructions  · Keep a weight loss journal to keep track of the food you eat and how much you exercise you get.  · Take over-the-counter and prescription medicines only as told by your health care provider.  · Take vitamins and supplements only as told by your health care provider.  · Consider joining a support group. Your health care provider may be able to recommend a support group.  · Keep all follow-up visits as told by your health care provider. This is important.  Contact a health care provider if:  · You are unable to meet your weight loss goal after 6 weeks of dietary and lifestyle changes.  This information is not intended to replace advice given to you by your health care provider. Make sure you discuss any questions you have with your health care provider.  Document Released: 01/25/2006 Document Revised: 05/22/2017 Document Reviewed: 10/05/2016  [employer] Interactive Patient Education © 2017 [employer]      Sleep Apnea  Sleep apnea is a condition in which breathing pauses or becomes shallow during sleep. Episodes of sleep apnea usually last 10 seconds or longer, and they may occur as many as 20 times an hour. Sleep apnea disrupts your sleep and keeps your body from getting the rest that it needs. This condition can increase your risk of certain health problems, including:  · Heart attack.  · Stroke.  · Obesity.  · Diabetes.  · Heart failure.  · Irregular heartbeat.  There are three kinds of sleep apnea:  · Obstructive sleep apnea. This kind is caused by a blocked or  collapsed airway.  · Central sleep apnea. This kind happens when the part of the brain that controls breathing does not send the correct signals to the muscles that control breathing.  · Mixed sleep apnea. This is a combination of obstructive and central sleep apnea.  What are the causes?  The most common cause of this condition is a collapsed or blocked airway. An airway can collapse or become blocked if:  · Your throat muscles are abnormally relaxed.  · Your tongue and tonsils are larger than normal.  · You are overweight.  · Your airway is smaller than normal.  What increases the risk?  This condition is more likely to develop in people who:  · Are overweight.  · Smoke.  · Have a smaller than normal airway.  · Are elderly.  · Are male.  · Drink alcohol.  · Take sedatives or tranquilizers.  · Have a family history of sleep apnea.  What are the signs or symptoms?  Symptoms of this condition include:  · Trouble staying asleep.  · Daytime sleepiness and tiredness.  · Irritability.  · Loud snoring.  · Morning headaches.  · Trouble concentrating.  · Forgetfulness.  · Decreased interest in sex.  · Unexplained sleepiness.  · Mood swings.  · Personality changes.  · Feelings of depression.  · Waking up often during the night to urinate.  · Dry mouth.  · Sore throat.  How is this diagnosed?  This condition may be diagnosed with:  · A medical history.  · A physical exam.  · A series of tests that are done while you are sleeping (sleep study). These tests are usually done in a sleep lab, but they may also be done at home.  How is this treated?  Treatment for this condition aims to restore normal breathing and to ease symptoms during sleep. It may involve managing health issues that can affect breathing, such as high blood pressure or obesity. Treatment may include:  · Sleeping on your side.  · Using a decongestant if you have nasal congestion.  · Avoiding the use of depressants, including alcohol, sedatives, and  narcotics.  · Losing weight if you are overweight.  · Making changes to your diet.  · Quitting smoking.  · Using a device to open your airway while you sleep, such as:  ¨ An oral appliance. This is a custom-made mouthpiece that shifts your lower jaw forward.  ¨ A continuous positive airway pressure (CPAP) device. This device delivers oxygen to your airway through a mask.  ¨ A nasal expiratory positive airway pressure (EPAP) device. This device has valves that you put into each nostril.  ¨ A bi-level positive airway pressure (BPAP) device. This device delivers oxygen to your airway through a mask.  · Surgery if other treatments do not work. During surgery, excess tissue is removed to create a wider airway.  It is important to get treatment for sleep apnea. Without treatment, this condition can lead to:  · High blood pressure.  · Coronary artery disease.  · (Men) An inability to achieve or maintain an erection (impotence).  · Reduced thinking abilities.  Follow these instructions at home:  · Make any lifestyle changes that your health care provider recommends.  · Eat a healthy, well-balanced diet.  · Take over-the-counter and prescription medicines only as told by your health care provider.  · Avoid using depressants, including alcohol, sedatives, and narcotics.  · Take steps to lose weight if you are overweight.  · If you were given a device to open your airway while you sleep, use it only as told by your health care provider.  · Do not use any tobacco products, such as cigarettes, chewing tobacco, and e-cigarettes. If you need help quitting, ask your health care provider.  · Keep all follow-up visits as told by your health care provider. This is important.  Contact a health care provider if:  · The device that you received to open your airway during sleep is uncomfortable or does not seem to be working.  · Your symptoms do not improve.  · Your symptoms get worse.  Get help right away if:  · You develop chest  "pain.  · You develop shortness of breath.  · You develop discomfort in your back, arms, or stomach.  · You have trouble speaking.  · You have weakness on one side of your body.  · You have drooping in your face.  These symptoms may represent a serious problem that is an emergency. Do not wait to see if the symptoms will go away. Get medical help right away. Call your local emergency services (911 in the [place] Do not drive yourself to the hospital.   This information is not intended to replace advice given to you by your health care provider. Make sure you discuss any questions you have with your health care provider.  Document Released: 12/08/2003 Document Revised: 08/13/2017 Document Reviewed: 09/26/2016  [email or web address] Interactive Patient Education © 2017 [email or web address]  Idiopathic Intracranial Hypertension  Idiopathic intracranial hypertension (IIH) is a neurologic disorder that leads to increased pressure around your brain. It can cause vision loss and blindness if left untreated.  RISK FACTORS  IIH is most common in very overweight (obese) women of childbearing age.  SIGNS AND SYMPTOMS   Symptoms of IIH include:  · Headache.  · Feeling of sickness in your stomach (nausea).  · Vomiting.  · A \"rushing of water\" sound within your ears (pulsatile tinnitus).  · Double vision.  DIAGNOSIS   Idiopathic intracranial hypertension is diagnosed with the aid of different exams:  · Brain scans such as:  ¨ CT.  ¨ MRI.  ¨ MRV.  · Diagnostic lumbar puncture. This procedure can determine if there is too much spinal fluid within the central nervous system. Too much spinal fluid can increase intracranial pressure.  · A thorough eye exam will be done to look for swelling within the eyes. Visual field testing will also be done to see if any damage has occurred to nerves in the eyes.  TREATMENT   Treatment of idiopathic intracranial hypertension is based on symptoms. Common treatments include:  · Lumbar puncture to remove excess spinal " fluid.  · Medicine.  · Surgery.  HOME CARE INSTRUCTIONS  The most important thing anyone can do to improve this condition is lose weight if they are overweight.   SEEK MEDICAL CARE IF:  · You have changes in vision.  · You have double vision.  · You have loss of color vision.  SEEK IMMEDIATE MEDICAL CARE IF:   · Your headaches get worse rather than better.  · Nausea or vomiting or both continue after treatment.  · Your vision does not improve or gets worse after treatment.  MAKE SURE YOU:  · Understand these instructions.  · Will watch your condition.  · Will get help right away if you are not doing well or get worse.  This information is not intended to replace advice given to you by your health care provider. Make sure you discuss any questions you have with your health care provider.  Document Released: 02/26/2003 Document Revised: 12/23/2014 Document Reviewed: 08/25/2014  [employer] Interactive Patient Education © 2017 [employer]

## 2020-04-06 NOTE — PROGRESS NOTES
Hospital Medicine Progress Note, Adult, Complex               Author: Lalo Vernon Date & Time created: 1/24/2017  6:48 PM     Interval History:  Admitted for Quadriparesis.    Quadriparesis - no improvement, case discussed with Neurology  Pain - not controlled  HTN - BP controlled    Review of Systems:  Review of Systems   Constitutional: Positive for malaise/fatigue. Negative for fever and chills.   HENT: Negative for sore throat.    Eyes: Negative for blurred vision.   Respiratory: Negative for cough, shortness of breath and wheezing.    Cardiovascular: Negative for chest pain.   Gastrointestinal: Negative for heartburn, nausea, vomiting, abdominal pain and diarrhea.   Musculoskeletal: Positive for joint pain.   Neurological: Positive for sensory change, focal weakness and weakness. Negative for dizziness, speech change, seizures and headaches.       Physical Exam:  Physical Exam   Constitutional: She is oriented to person, place, and time. She appears well-developed.   Morbidly obese   HENT:   Head: Normocephalic and atraumatic.   Eyes: Conjunctivae are normal. Pupils are equal, round, and reactive to light.   Neck: No tracheal deviation present. No thyromegaly present.   Cardiovascular: Normal rate and regular rhythm.    Pulmonary/Chest: Effort normal and breath sounds normal.   Abdominal: Soft. Bowel sounds are normal. She exhibits no distension. There is no tenderness.   Musculoskeletal: She exhibits edema.   Lymphadenopathy:     She has no cervical adenopathy.   Neurological: She is alert and oriented to person, place, and time. No cranial nerve deficit.   MMT RUE 3/5, RLE 0-1/5, LUE 4+/5, LLE 0-1/5   Skin: Skin is warm and dry.   Nursing note and vitals reviewed.      Labs:        Invalid input(s): [id number]  Recent Labs      01/22/17   0735   TROPONINI  <0.01     Recent Labs      01/22/17   0735  01/22/17   0820   SODIUM  136  139   POTASSIUM  4.2  4.3   CHLORIDE  105  105   CO2  22  24   BUN   12  12   CREATININE  0.61  0.69   CALCIUM  9.8  10.1     Recent Labs      17   0735  17   0820   ALTSGPT  61*  64*   ASTSGOT  38  39   ALKPHOSPHAT  81  75   TBILIRUBIN  0.3  0.4   GLUCOSE  108*  85     Recent Labs      17   0735  17   0820   RBC  4.72  4.75   HEMOGLOBIN  13.8  14.0   HEMATOCRIT  41.9  42.4   PLATELETCT  198  206   PROTHROMBTM  12.7   --    INR  0.93   --      Recent Labs      17   0735  17   0820   WBC  6.6  7.0   NEUTSPOLYS  52.20  50.50   LYMPHOCYTES  36.60  38.20   MONOCYTES  7.20  7.40   EOSINOPHILS  2.70  2.80   BASOPHILS  0.80  0.70   ASTSGOT  38  39   ALTSGPT  61*  64*   ALKPHOSPHAT  81  75   TBILIRUBIN  0.3  0.4           Hemodynamics:  Temp (24hrs), Av.6 °C (97.8 °F), Min:36.3 °C (97.3 °F), Max:37.2 °C (98.9 °F)  Temperature: 36.3 °C (97.4 °F)  Pulse  Av  Min: 70  Max: 108   Blood Pressure: 114/57 mmHg     Respiratory:    Respiration: 18, Pulse Oximetry: 97 %     Work Of Breathing / Effort: Mild  RUL Breath Sounds: Diminished, RML Breath Sounds: Diminished, RLL Breath Sounds: Diminished, MARY Breath Sounds: Diminished, LLL Breath Sounds: Diminished  Fluids:    Intake/Output Summary (Last 24 hours) at 17 1848  Last data filed at 17 1800   Gross per 24 hour   Intake    708 ml   Output   1650 ml   Net   -942 ml        GI/Nutrition:  Orders Placed This Encounter   Procedures   • Diet Order     Standing Status: Standing      Number of Occurrences: 1      Standing Expiration Date:      Order Specific Question:  Diet:     Answer:  Consistent Carbohydrate [4]     Order Specific Question:  Texture/Fiber modifications:     Answer:  Dysphagia 1(Pureed)specify fluid consistency(question 6) [1]     Medical Decision Making, by Problem:  Active Hospital Problems    Diagnosis   • *Quadriparesis (CMS-HCC) [G82.50]   IV Solumedrol, Neurology following, for muscle biopsy?   • HTN (hypertension) [I10]     No current meds   • Chronic pain syndrome [G89.4]      Fentanyl patch, increase Oxycodone, continue Neurontin   • Dysphagia [R13.10]      Dysphagia 1   • Chronic inflammatory arthritis [M19.90]     previously on Plaquenil   • Suprapubic catheter (CMS-[facility]) [Z93.59]          • Schizophrenia (CMS-[facility]) [F20.9]   Geodon, Prozac   • TONYA on CPAP [G47.33]        May use home cpap   • Hypothyroidism [E03.9]       Synthroid       Medications reviewed, Labs reviewed, Radiology images reviewed and EKG reviewed  Andrade catheter: Neurogenic Bladder      DVT Prophylaxis: Enoxaparin (Lovenox)    Ulcer prophylaxis: Yes    Assessed for rehab: Patient was assess for and/or received rehabilitation services during this hospitalization         DM2 w/ hyperglycemia   Lantus 8 units Q HS added but patient refusing   Insulin sliding scale   Monitor fingersticks

## 2020-04-14 ENCOUNTER — PATIENT MESSAGE (OUTPATIENT)
Dept: HEALTH INFORMATION MANAGEMENT | Facility: OTHER | Age: 30
End: 2020-04-14

## 2020-04-15 ENCOUNTER — APPOINTMENT (OUTPATIENT)
Dept: RADIOLOGY | Facility: MEDICAL CENTER | Age: 30
End: 2020-04-15
Attending: EMERGENCY MEDICINE
Payer: MEDICARE

## 2020-04-15 ENCOUNTER — HOSPITAL ENCOUNTER (EMERGENCY)
Facility: MEDICAL CENTER | Age: 30
End: 2020-04-15
Attending: EMERGENCY MEDICINE
Payer: MEDICARE

## 2020-04-15 VITALS
BODY MASS INDEX: 42.49 KG/M2 | RESPIRATION RATE: 16 BRPM | WEIGHT: 255 LBS | HEART RATE: 76 BPM | SYSTOLIC BLOOD PRESSURE: 104 MMHG | OXYGEN SATURATION: 96 % | DIASTOLIC BLOOD PRESSURE: 57 MMHG | TEMPERATURE: 98.4 F | HEIGHT: 65 IN

## 2020-04-15 DIAGNOSIS — R07.81 PLEURITIC CHEST PAIN: ICD-10-CM

## 2020-04-15 LAB
ALBUMIN SERPL BCP-MCNC: 4.2 G/DL (ref 3.2–4.9)
ALBUMIN/GLOB SERPL: 2.2 G/DL
ALP SERPL-CCNC: 52 U/L (ref 30–99)
ALT SERPL-CCNC: 34 U/L (ref 2–50)
ANION GAP SERPL CALC-SCNC: 16 MMOL/L (ref 7–16)
AST SERPL-CCNC: 13 U/L (ref 12–45)
BASE EXCESS BLDV CALC-SCNC: -13 MMOL/L
BASOPHILS # BLD AUTO: 0.3 % (ref 0–1.8)
BASOPHILS # BLD: 0.02 K/UL (ref 0–0.12)
BILIRUB SERPL-MCNC: 0.2 MG/DL (ref 0.1–1.5)
BODY TEMPERATURE: ABNORMAL CENTIGRADE
BUN SERPL-MCNC: 10 MG/DL (ref 8–22)
CALCIUM SERPL-MCNC: 9.1 MG/DL (ref 8.5–10.5)
CHLORIDE SERPL-SCNC: 109 MMOL/L (ref 96–112)
CO2 SERPL-SCNC: 14 MMOL/L (ref 20–33)
CREAT SERPL-MCNC: 0.57 MG/DL (ref 0.5–1.4)
D DIMER PPP IA.FEU-MCNC: <0.27 UG/ML (FEU) (ref 0–0.5)
EKG IMPRESSION: NORMAL
EOSINOPHIL # BLD AUTO: 0 K/UL (ref 0–0.51)
EOSINOPHIL NFR BLD: 0 % (ref 0–6.9)
ERYTHROCYTE [DISTWIDTH] IN BLOOD BY AUTOMATED COUNT: 46.7 FL (ref 35.9–50)
GLOBULIN SER CALC-MCNC: 1.9 G/DL (ref 1.9–3.5)
GLUCOSE SERPL-MCNC: 155 MG/DL (ref 65–99)
HCO3 BLDV-SCNC: 13 MMOL/L (ref 24–28)
HCT VFR BLD AUTO: 39.9 % (ref 37–47)
HGB BLD-MCNC: 12.8 G/DL (ref 12–16)
IMM GRANULOCYTES # BLD AUTO: 0.12 K/UL (ref 0–0.11)
IMM GRANULOCYTES NFR BLD AUTO: 1.9 % (ref 0–0.9)
LACTATE BLD-SCNC: 1.2 MMOL/L (ref 0.5–2)
LYMPHOCYTES # BLD AUTO: 0.75 K/UL (ref 1–4.8)
LYMPHOCYTES NFR BLD: 11.8 % (ref 22–41)
MCH RBC QN AUTO: 30 PG (ref 27–33)
MCHC RBC AUTO-ENTMCNC: 32.1 G/DL (ref 33.6–35)
MCV RBC AUTO: 93.4 FL (ref 81.4–97.8)
MONOCYTES # BLD AUTO: 0.44 K/UL (ref 0–0.85)
MONOCYTES NFR BLD AUTO: 7 % (ref 0–13.4)
NEUTROPHILS # BLD AUTO: 5 K/UL (ref 2–7.15)
NEUTROPHILS NFR BLD: 79 % (ref 44–72)
NRBC # BLD AUTO: 0.02 K/UL
NRBC BLD-RTO: 0.3 /100 WBC
PCO2 BLDV: 27.2 MMHG (ref 41–51)
PH BLDV: 7.28 [PH] (ref 7.31–7.45)
PLATELET # BLD AUTO: 189 K/UL (ref 164–446)
PMV BLD AUTO: 11.8 FL (ref 9–12.9)
PO2 BLDV: 41.6 MMHG (ref 25–40)
POTASSIUM SERPL-SCNC: 3.8 MMOL/L (ref 3.6–5.5)
PROT SERPL-MCNC: 6.1 G/DL (ref 6–8.2)
RBC # BLD AUTO: 4.27 M/UL (ref 4.2–5.4)
SAO2 % BLDV: 77.4 %
SODIUM SERPL-SCNC: 139 MMOL/L (ref 135–145)
TROPONIN T SERPL-MCNC: <6 NG/L (ref 6–19)
WBC # BLD AUTO: 6.3 K/UL (ref 4.8–10.8)

## 2020-04-15 PROCEDURE — A9270 NON-COVERED ITEM OR SERVICE: HCPCS | Performed by: EMERGENCY MEDICINE

## 2020-04-15 PROCEDURE — 93005 ELECTROCARDIOGRAM TRACING: CPT | Performed by: EMERGENCY MEDICINE

## 2020-04-15 PROCEDURE — 85025 COMPLETE CBC W/AUTO DIFF WBC: CPT

## 2020-04-15 PROCEDURE — 93005 ELECTROCARDIOGRAM TRACING: CPT

## 2020-04-15 PROCEDURE — 80053 COMPREHEN METABOLIC PANEL: CPT

## 2020-04-15 PROCEDURE — 700111 HCHG RX REV CODE 636 W/ 250 OVERRIDE (IP): Performed by: EMERGENCY MEDICINE

## 2020-04-15 PROCEDURE — 82803 BLOOD GASES ANY COMBINATION: CPT

## 2020-04-15 PROCEDURE — 71045 X-RAY EXAM CHEST 1 VIEW: CPT

## 2020-04-15 PROCEDURE — 84484 ASSAY OF TROPONIN QUANT: CPT

## 2020-04-15 PROCEDURE — 83605 ASSAY OF LACTIC ACID: CPT

## 2020-04-15 PROCEDURE — 99285 EMERGENCY DEPT VISIT HI MDM: CPT

## 2020-04-15 PROCEDURE — 85379 FIBRIN DEGRADATION QUANT: CPT

## 2020-04-15 PROCEDURE — 700102 HCHG RX REV CODE 250 W/ 637 OVERRIDE(OP): Performed by: EMERGENCY MEDICINE

## 2020-04-15 PROCEDURE — 96374 THER/PROPH/DIAG INJ IV PUSH: CPT

## 2020-04-15 PROCEDURE — 87040 BLOOD CULTURE FOR BACTERIA: CPT | Mod: 91

## 2020-04-15 RX ORDER — ACETAMINOPHEN 325 MG/1
650 TABLET ORAL ONCE
Status: COMPLETED | OUTPATIENT
Start: 2020-04-15 | End: 2020-04-15

## 2020-04-15 RX ORDER — IBUPROFEN 600 MG/1
600 TABLET ORAL EVERY 6 HOURS PRN
Qty: 30 TAB | Refills: 0 | Status: SHIPPED
Start: 2020-04-15 | End: 2020-04-20

## 2020-04-15 RX ORDER — LORAZEPAM 2 MG/ML
2 INJECTION INTRAMUSCULAR ONCE
Status: COMPLETED | OUTPATIENT
Start: 2020-04-15 | End: 2020-04-15

## 2020-04-15 RX ADMIN — LORAZEPAM 2 MG: 2 INJECTION INTRAMUSCULAR; INTRAVENOUS at 03:12

## 2020-04-15 RX ADMIN — ACETAMINOPHEN 650 MG: 325 TABLET, FILM COATED ORAL at 02:07

## 2020-04-15 ASSESSMENT — FIBROSIS 4 INDEX: FIB4 SCORE: 0.27

## 2020-04-15 NOTE — DISCHARGE PLANNING
Medical Social Work    Referral: Transportation     Intervention: MSW received a call from bedside RN that pt is ready to D/C home and pt states that she always returns home via [facility].  MSW reviewed chart and pt does require transport home via [facility] per notes.  RN verified pt's home address: [address] [place] [id number].  MSW contacted S[facility] with [facility] to arrange stretcher transport.  PCS and facesheet faxed to [facility] and follow up phone call was made to Kip with [facility] to set up transport for 0500.  Bedside RN made aware of transport time.    Plan: Pt to D/C home via [facility] at 0500.

## 2020-04-15 NOTE — DISCHARGE INSTRUCTIONS
You likely have a pleuritis but may have COVID-19. At this point we do not have testing available in the outpatient setting here in the emergency department for COVID-19.  Per your test at [facility] was negative the COVID test is only 70% sensitive; meaning it limits 30% of cases.  Therefore, COVID-19 is not ruled out and you will need to remain in home quarantine until all three of the following are true:  You are 7 days from symptom onset  your symptoms are improving   you have been fever free for at least 72hours.  Testing is only occurring through the [facility] district at this point; you may call them at [phone].  After a phone triage process you may or may not be tested.  If you develop significant shortness of breath, meaning that it is difficult for you to walk even short distances without having to stop and catch your breath, or you become severely dizzy and this is persistent then please return to the emergency department.

## 2020-04-15 NOTE — ED TRIAGE NOTES
.  Chief Complaint   Patient presents with   • Chest Pain   • Shortness of Breath     since Sunday      Pt BIB EMS from home with above complaints, Pt states she was admitted to [facility] for PNU, discharged on 4/8. Pt C/O increasing SOB and chest pain since Sunday, Pt notes chest pain is sharp and worse upon inspiration. Pt vomiting since Sunday, last emesis this morning. Pt given Zofran 4 mg ODT, Pt took 5 81 mg ASA PTA.   Pt wearing mask upon arrival. All staff wearing PPE per protocol, N95, goggles and gloves.

## 2020-04-15 NOTE — ED PROVIDER NOTES
ED Provider Note    CHIEF COMPLAINT  Chief Complaint   Patient presents with   • Chest Pain   • Shortness of Breath     since Sunday       [facility]  Kristin Balderrama is a 29 y.o. female who presents with chief complaint of cough and shortness of breath.  Patient was seen at [facility] and diagnosed with pneumonia, she was therefore multiple days and given vancomycin and ceftriaxone IV.  She was discharged on Wednesday, approximately 1 week ago with cefdinir and doxycycline, a 10-day course which she has been taking without missing any doses.  She reports that despite this her symptoms have returned, upon leaving the hospital she was feeling better but now reports she is having fevers and chills and ongoing cough with associated left-sided pleuritic chest pain and shortness of breath.  She reports the chest pain is only on deep breaths.  She reports she has having a cough that is productive of green sputum which is increased in frequency and quantity over the past few days.  She denies any associated abdominal pain but does report some nausea, she has had a few episodes of nonbloody nonbilious emesis.  She denies any changes in her bowel movements, she denies any diarrhea, melena or hematochezia.  Patient does have a history of transverse myelitis.  She also reports a small bedsore on her back.  Patient denies any associated chest pain on exertion or decreased exertional tolerance.    REVIEW OF SYSTEMS  ROS    See HPI for further details. All other systems are negative.     PAST MEDICAL HISTORY   has a past medical history of Abdominal pain, Anginal syndrome, Apnea, sleep, Arrhythmia, Arthritis, ASTHMA, Atrial fibrillation (HCC), Back pain, Borderline personality disorder ([facility]), Breath shortness, Bronchitis, Cardiac arrhythmia, Chickenpox, Chronic UTI (9/18/2010), Cough, Daytime sleepiness, Depression, Diabetes ([facility]), Diarrhea, Disorder of thyroid, Fall, Fatigue, Frequent headaches, Gasping for breath,  Gynecological disorder, Headache(784.0), Heart burn, History of falling, Hypertension, Indigestion, Migraine, 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HCC), 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HCC), Nausea, Obesity, Pain (08-15-12), Painful joint, PCOS (polycystic ovarian syndrome), Pneumonia (2012), Psychosis (HCC), Renal disorder, Ringing in ears, Scoliosis, Shortness of breath, Sinus tachycardia (10/31/2013), Sleep apnea, Snoring, Tonsillitis, Tuberculosis, Urinary bladder disorder, Urinary incontinence, Weakness, and Wears glasses.    SOCIAL HISTORY  Social History     Tobacco Use   • Smoking status: Never Smoker   • Smokeless tobacco: Never Used   Substance and Sexual Activity   • Alcohol use: No     Alcohol/week: 0.0 oz   • Drug use: Not Currently     Frequency: 7.0 times per week     Types: Marijuana, Oral     Comment: Medicinal edible's   • Sexual activity: Not Currently     Birth control/protection: Pill       SURGICAL HISTORY   has a past surgical history that includes neuro dest facet l/s w/ig sngl (4/21/2015); recovery (1/27/2016); delmar by laparoscopy (8/29/2010); lumbar fusion anterior (8/21/2012); other cardiac surgery (1/2016); tonsillectomy; bowel stimulator insertion (7/13/2016); gastroscopy with balloon dilatation (N/A, 1/4/2017); muscle biopsy (Right, 1/26/2017); other abdominal surgery; and laminotomy.    CURRENT MEDICATIONS  Home Medications     Reviewed by Katarina Barrientos R.N. (Registered Nurse) on 04/15/20 at 0056  Med List Status: Complete   Medication Last Dose Status   acetaZOLAMIDE (DIAMOX) 250 MG Tab 4/15/2020 Active   albuterol 108 (90 Base) MCG/ACT Aero Soln inhalation aerosol 4/15/2020 Active   aspirin EC (ECOTRIN) 81 MG Tablet Delayed Response 4/15/2020 Active   budesonide-formoterol (SYMBICORT) 160-4.5 MCG/ACT Aerosol 4/15/2020 Active   busPIRone (BUSPAR) 10 MG Tab tablet 4/15/2020 Active   Diclofenac Sodium (VOLTAREN) 1 % Gel  Active   Dulaglutide (TRULICITY) 1.5 MG/0.5ML Solution Pen-injector  Active  "  etonogestrel (NEXPLANON) 68 MG Implant implant  Active   fluoxetine (PROZAC) 40 MG capsule 4/15/2020 Active   furosemide (LASIX) 80 MG Tab 4/15/2020 Active   ipratropium-albuterol (DUONEB) 0.5-2.5 (3) MG/3ML nebulizer solution 4/15/2020 Active   Ivabradine HCl (CORLANOR) 7.5 MG Tab  Active   levothyroxine (SYNTHROID) 100 MCG Tab 4/15/2020 Active   Melatonin 10 MG Tab 4/15/2020 Active   modafinil (PROVIGIL) 200 MG Tab 4/15/2020 Active   mycophenolate (CELLCEPT) 500 MG tablet 4/15/2020 Active   ondansetron (ZOFRAN ODT) 4 MG TABLET DISPERSIBLE 4/15/2020 Active   OXcarbazepine (TRILEPTAL) 150 MG Tab 4/15/2020 Active   potassium chloride SA (KDUR) 20 MEQ Tab CR 4/15/2020 Active   predniSONE 5 MG Tablet Delayed Response 4/15/2020 Active   pregabalin (LYRICA) 300 MG capsule 4/15/2020 Active   promethazine (PHENERGAN) 25 MG Suppos  Active   senna-docusate (PERICOLACE OR SENOKOT S) 8.6-50 MG Tab  Active   tiotropium (SPIRIVA) 18 MCG Cap 4/15/2020 Active   traZODone (DESYREL) 100 MG Tab 4/15/2020 Active   vitamin D (VITAMIND D3) 1000 UNIT Tab 4/15/2020 Active   ziprasidone (GEODON) 40 MG Cap 4/15/2020 Active                ALLERGIES  Allergies   Allergen Reactions   • Cefdinir Shortness of Breath and Itching     Tolerated 1/18/17  Tolerates ceftriaxone  Tolerated augmentin 8/2019    • Depakote [Divalproex Sodium] Unspecified     Muscle spasms/muscle pain and weakness     • Doxycycline Anaphylaxis and Vomiting     RXN=unknown   • Amitriptyline Unspecified     Headaches     • Aripiprazole [Abilify] Unspecified     Headaches/muscle twitching     • Clindamycin Nausea     Even with food     • Flagyl [Metronidazole Hcl] Unspecified     \"eye problems\"     • Flomax [Tamsulosin Hydrochloride] Swelling   • Levaquin Unspecified     Severe muscle cramps in legs  RXN=unknown   • Metformin Unspecified     Increased lactic acid      • Tape Rash     Tears skin off  coban with Tegaderm tape ok intermittently  RXN=ongoing   • Vancomycin " Itching     Pt becomes flushed in face and chest.   RXN=7/10/16   • Wound Dressing Adhesive Hives     By pt report   • Ciprofloxacin    • Depakote  [Divalproex Sodium]    • Hydromorphone Hcl    • Kdc:Metronidazole+Tartrazine    • Keflex Rash     Pt states she gets a rash with this medication  Tolerates ceftriaxone   • Levofloxacin Unspecified     Leg muscle cramps   • Penicillins        PHYSICAL EXAM  Physical Exam   Constitutional: She is oriented to person, place, and time. She appears well-developed and well-nourished.   HENT:   Head: Normocephalic.   Eyes: Pupils are equal, round, and reactive to light. Conjunctivae are normal.   Neck: Normal range of motion. Neck supple.   Cardiovascular: Normal rate and regular rhythm.   Pulmonary/Chest: Effort normal and breath sounds normal. No respiratory distress. She has no wheezes. She has no rales.   Pain evoked on deep breaths   Abdominal: Soft. Bowel sounds are normal. She exhibits no distension. There is no abdominal tenderness. There is no rebound and no guarding.   Neurological: She is alert and oriented to person, place, and time.   Skin: Skin is warm and dry. No rash noted.         DIAGNOSTIC STUDIES / PROCEDURES    EKG  See below    LABS  Results for orders placed or performed during the hospital encounter of 04/15/20   TROPONIN   Result Value Ref Range    Troponin T <6 6 - 19 ng/L   CBC WITH DIFFERENTIAL   Result Value Ref Range    WBC 6.3 4.8 - 10.8 K/uL    RBC 4.27 4.20 - 5.40 M/uL    Hemoglobin 12.8 12.0 - 16.0 g/dL    Hematocrit 39.9 37.0 - 47.0 %    MCV 93.4 81.4 - 97.8 fL    MCH 30.0 27.0 - 33.0 pg    MCHC 32.1 (L) 33.6 - 35.0 g/dL    RDW 46.7 35.9 - 50.0 fL    Platelet Count 189 164 - 446 K/uL    MPV 11.8 9.0 - 12.9 fL    Neutrophils-Polys 79.00 (H) 44.00 - 72.00 %    Lymphocytes 11.80 (L) 22.00 - 41.00 %    Monocytes 7.00 0.00 - 13.40 %    Eosinophils 0.00 0.00 - 6.90 %    Basophils 0.30 0.00 - 1.80 %    Immature Granulocytes 1.90 (H) 0.00 - 0.90 %     Nucleated RBC 0.30 /100 WBC    Neutrophils (Absolute) 5.00 2.00 - 7.15 K/uL    Lymphs (Absolute) 0.75 (L) 1.00 - 4.80 K/uL    Monos (Absolute) 0.44 0.00 - 0.85 K/uL    Eos (Absolute) 0.00 0.00 - 0.51 K/uL    Baso (Absolute) 0.02 0.00 - 0.12 K/uL    Immature Granulocytes (abs) 0.12 (H) 0.00 - 0.11 K/uL    NRBC (Absolute) 0.02 K/uL   CMP   Result Value Ref Range    Sodium 139 135 - 145 mmol/L    Potassium 3.8 3.6 - 5.5 mmol/L    Chloride 109 96 - 112 mmol/L    Co2 14 (L) 20 - 33 mmol/L    Anion Gap 16.0 7.0 - 16.0    Glucose 155 (H) 65 - 99 mg/dL    Bun 10 8 - 22 mg/dL    Creatinine 0.57 0.50 - 1.40 mg/dL    Calcium 9.1 8.5 - 10.5 mg/dL    AST(SGOT) 13 12 - 45 U/L    ALT(SGPT) 34 2 - 50 U/L    Alkaline Phosphatase 52 30 - 99 U/L    Total Bilirubin 0.2 0.1 - 1.5 mg/dL    Albumin 4.2 3.2 - 4.9 g/dL    Total Protein 6.1 6.0 - 8.2 g/dL    Globulin 1.9 1.9 - 3.5 g/dL    A-G Ratio 2.2 g/dL   ESTIMATED GFR   Result Value Ref Range    GFR If African American >60 >60 mL/min/1.73 m 2    GFR If Non African American >60 >60 mL/min/1.73 m 2   D-DIMER   Result Value Ref Range    D-Dimer Screen <0.27 0.00 - 0.50 ug/mL (FEU)   LACTIC ACID   Result Value Ref Range    Lactic Acid 1.2 0.5 - 2.0 mmol/L   VENOUS BLOOD GAS   Result Value Ref Range    Venous Bg Ph 7.28 (L) 7.31 - 7.45    Venous Bg Pco2 27.2 (L) 41.0 - 51.0 mmHg    Venous Bg Po2 41.6 (H) 25.0 - 40.0 mmHg    Venous Bg O2 Saturation 77.4 %    Venous Bg Hco3 13 (L) 24 - 28 mmol/L    Venous Bg Base Excess -13 mmol/L    Body Temp see below Centigrade   EKG (NOW)   Result Value Ref Range    Report       [facility] Emergency Dept.    Test Date:  2020-04-15  Pt Name:    HARLEY DE LA CRUZ              Department: ER  MRN:        [id number]                      Room:       [facility]  Gender:     Female                       Technician: [id number]  :        1990                   Requested By:ER TRIAGE PROTOCOL  Order #:    [id number]                    Reading MD:  Juan José Macdonald MD    Measurements  Intervals                                Axis  Rate:       75                           P:          54  MT:         148                          QRS:        29  QRSD:       90                           T:          19  QT:         424  QTc:        474    Interpretive Statements  EKG is normal sinus rhythm, normal axis normal intervals no ST changes  consistent with acute regional ischemia  Electronically Signed On 4- 1:59:34 PDT by Juan José Macdonald MD       *Note: Due to a large number of results and/or encounters for the requested time period, some results have not been displayed. A complete set of results can be found in Results Review.         RADIOLOGY  DX-CHEST-PORTABLE (1 VIEW)   Final Result      Hypoinflation without other evidence for acute cardiopulmonary disease.          COURSE & MEDICAL DECISION MAKING  Pertinent Labs & Imaging studies reviewed. (See chart for details)    Patient here with mild pleuritic chest pain.  Questionable pleuritis or ongoing pneumonia versus false negative CO VID versus pulmonary embolism.  I believe that ACS is highly unlikely especially in the setting of patient's reassuring EKG and lack of any nausea vomiting or shortness of breath on exertion or chest pain exacerbated by exertion.  Patient's work of breathing is entirely normal, she is satting 98 to 100% on room air.  Patient without any associated abdominal tenderness on exam, surgical pathology highly unlikely.  Patient's basic labs are reassuring, she does have evidence of acidosis which is likely secondary to her very robust medication list.  Patient's chest x-ray is unremarkable.  Her troponin and d-dimer also both below the threshold.  Patient will be discharged home with follow-up with her primary care physician.  I discussed return precautions.  I have also discussed with patient that she should self quarantine until symptoms have resolved and to return to the emergency  department if they worsen.    The patient will return for worsening symptoms and is stable at the time of discharge. The patient verbalizes understanding and will comply.    FINAL IMPRESSION    1. Pleuritic chest pain               Electronically signed by: Torres Can M.D., 4/15/2020 12:56 AM

## 2020-04-15 NOTE — ED NOTES
Jenna has arrived to transport pt home. Discharge instructions given to pt, pt verbalized understanding. VSS. No prescriptions. All personal belongings accounted for. Pt left via gurney with personal wheelchair and all belongings. IV was removed.

## 2020-04-16 ENCOUNTER — TELEPHONE (OUTPATIENT)
Dept: OPHTHALMOLOGY | Facility: MEDICAL CENTER | Age: 30
End: 2020-04-16

## 2020-04-16 ENCOUNTER — TELEPHONE (OUTPATIENT)
Dept: MEDICAL GROUP | Facility: MEDICAL CENTER | Age: 30
End: 2020-04-16

## 2020-04-16 ENCOUNTER — APPOINTMENT (OUTPATIENT)
Dept: PULMONOLOGY | Facility: HOSPICE | Age: 30
End: 2020-04-16
Payer: MEDICARE

## 2020-04-16 NOTE — TELEPHONE ENCOUNTER
"1. Caller Name: Kristin                        Call Back Number: [phone]      How would the patient prefer to be contacted with a response: Phone call do NOT leave a detailed message    Patient called and she has 2 sore, red \"cracks\" on her buttocks. She was wondering if there is anything she can put on it. Please advise.  "

## 2020-04-17 ENCOUNTER — APPOINTMENT (OUTPATIENT)
Dept: RADIOLOGY | Facility: MEDICAL CENTER | Age: 30
End: 2020-04-17
Attending: EMERGENCY MEDICINE
Payer: MEDICARE

## 2020-04-17 ENCOUNTER — HOSPITAL ENCOUNTER (EMERGENCY)
Facility: MEDICAL CENTER | Age: 30
End: 2020-04-17
Attending: EMERGENCY MEDICINE
Payer: MEDICARE

## 2020-04-17 VITALS
RESPIRATION RATE: 18 BRPM | BODY MASS INDEX: 42.49 KG/M2 | WEIGHT: 255 LBS | TEMPERATURE: 98.4 F | OXYGEN SATURATION: 99 % | HEART RATE: 76 BPM | HEIGHT: 65 IN | SYSTOLIC BLOOD PRESSURE: 131 MMHG | DIASTOLIC BLOOD PRESSURE: 78 MMHG

## 2020-04-17 DIAGNOSIS — R53.1 WEAKNESS: ICD-10-CM

## 2020-04-17 DIAGNOSIS — L89.151 PRESSURE INJURY OF SACRAL REGION, STAGE 1: ICD-10-CM

## 2020-04-17 DIAGNOSIS — R11.2 NON-INTRACTABLE VOMITING WITH NAUSEA, UNSPECIFIED VOMITING TYPE: ICD-10-CM

## 2020-04-17 LAB
ALBUMIN SERPL BCP-MCNC: 4.1 G/DL (ref 3.2–4.9)
ALBUMIN/GLOB SERPL: 2.4 G/DL
ALP SERPL-CCNC: 56 U/L (ref 30–99)
ALT SERPL-CCNC: 43 U/L (ref 2–50)
ANION GAP SERPL CALC-SCNC: 11 MMOL/L (ref 7–16)
APPEARANCE UR: CLEAR
AST SERPL-CCNC: 18 U/L (ref 12–45)
BASE EXCESS BLDV CALC-SCNC: -11 MMOL/L
BASOPHILS # BLD AUTO: 0.1 % (ref 0–1.8)
BASOPHILS # BLD: 0.01 K/UL (ref 0–0.12)
BILIRUB SERPL-MCNC: 0.2 MG/DL (ref 0.1–1.5)
BILIRUB UR QL STRIP.AUTO: NEGATIVE
BODY TEMPERATURE: ABNORMAL CENTIGRADE
BUN SERPL-MCNC: 13 MG/DL (ref 8–22)
CALCIUM SERPL-MCNC: 8.9 MG/DL (ref 8.5–10.5)
CHLORIDE SERPL-SCNC: 114 MMOL/L (ref 96–112)
CO2 SERPL-SCNC: 15 MMOL/L (ref 20–33)
COLOR UR: YELLOW
COVID ORDER STATUS COVID19: NORMAL
CREAT SERPL-MCNC: 0.76 MG/DL (ref 0.5–1.4)
EKG IMPRESSION: NORMAL
EKG IMPRESSION: NORMAL
EOSINOPHIL # BLD AUTO: 0 K/UL (ref 0–0.51)
EOSINOPHIL NFR BLD: 0 % (ref 0–6.9)
ERYTHROCYTE [DISTWIDTH] IN BLOOD BY AUTOMATED COUNT: 47.7 FL (ref 35.9–50)
GLOBULIN SER CALC-MCNC: 1.7 G/DL (ref 1.9–3.5)
GLUCOSE SERPL-MCNC: 123 MG/DL (ref 65–99)
GLUCOSE UR STRIP.AUTO-MCNC: NEGATIVE MG/DL
HCO3 BLDV-SCNC: 14 MMOL/L (ref 24–28)
HCT VFR BLD AUTO: 40 % (ref 37–47)
HGB BLD-MCNC: 12.8 G/DL (ref 12–16)
IMM GRANULOCYTES # BLD AUTO: 0.06 K/UL (ref 0–0.11)
IMM GRANULOCYTES NFR BLD AUTO: 0.9 % (ref 0–0.9)
KETONES UR STRIP.AUTO-MCNC: NEGATIVE MG/DL
LACTATE BLD-SCNC: 1.6 MMOL/L (ref 0.5–2)
LEUKOCYTE ESTERASE UR QL STRIP.AUTO: NEGATIVE
LYMPHOCYTES # BLD AUTO: 0.61 K/UL (ref 1–4.8)
LYMPHOCYTES NFR BLD: 8.7 % (ref 22–41)
MCH RBC QN AUTO: 29.6 PG (ref 27–33)
MCHC RBC AUTO-ENTMCNC: 32 G/DL (ref 33.6–35)
MCV RBC AUTO: 92.6 FL (ref 81.4–97.8)
MICRO URNS: NORMAL
MONOCYTES # BLD AUTO: 0.31 K/UL (ref 0–0.85)
MONOCYTES NFR BLD AUTO: 4.4 % (ref 0–13.4)
NEUTROPHILS # BLD AUTO: 6.03 K/UL (ref 2–7.15)
NEUTROPHILS NFR BLD: 85.9 % (ref 44–72)
NITRITE UR QL STRIP.AUTO: NEGATIVE
NRBC # BLD AUTO: 0 K/UL
NRBC BLD-RTO: 0 /100 WBC
PCO2 BLDV: 28.9 MMHG (ref 41–51)
PH BLDV: 7.31 [PH] (ref 7.31–7.45)
PH UR STRIP.AUTO: 6 [PH] (ref 5–8)
PLATELET # BLD AUTO: 171 K/UL (ref 164–446)
PMV BLD AUTO: 12 FL (ref 9–12.9)
PO2 BLDV: 63 MMHG (ref 25–40)
POTASSIUM SERPL-SCNC: 4 MMOL/L (ref 3.6–5.5)
PROT SERPL-MCNC: 5.8 G/DL (ref 6–8.2)
PROT UR QL STRIP: NEGATIVE MG/DL
RBC # BLD AUTO: 4.32 M/UL (ref 4.2–5.4)
RBC UR QL AUTO: NEGATIVE
SAO2 % BLDV: 90.9 %
SARS-COV-2 RNA RESP QL NAA+PROBE: NEGATIVE
SODIUM SERPL-SCNC: 140 MMOL/L (ref 135–145)
SP GR UR STRIP.AUTO: 1.02
SPECIMEN SOURCE: NORMAL
TROPONIN T SERPL-MCNC: <6 NG/L (ref 6–19)
UROBILINOGEN UR STRIP.AUTO-MCNC: 1 MG/DL
WBC # BLD AUTO: 7 K/UL (ref 4.8–10.8)

## 2020-04-17 PROCEDURE — 96374 THER/PROPH/DIAG INJ IV PUSH: CPT

## 2020-04-17 PROCEDURE — 81003 URINALYSIS AUTO W/O SCOPE: CPT

## 2020-04-17 PROCEDURE — 93005 ELECTROCARDIOGRAM TRACING: CPT | Performed by: EMERGENCY MEDICINE

## 2020-04-17 PROCEDURE — 85025 COMPLETE CBC W/AUTO DIFF WBC: CPT

## 2020-04-17 PROCEDURE — G2023 SPECIMEN COLLECT COVID-19: HCPCS | Performed by: EMERGENCY MEDICINE

## 2020-04-17 PROCEDURE — 82803 BLOOD GASES ANY COMBINATION: CPT

## 2020-04-17 PROCEDURE — 700111 HCHG RX REV CODE 636 W/ 250 OVERRIDE (IP): Performed by: EMERGENCY MEDICINE

## 2020-04-17 PROCEDURE — 84484 ASSAY OF TROPONIN QUANT: CPT

## 2020-04-17 PROCEDURE — U0004 COV-19 TEST NON-CDC HGH THRU: HCPCS

## 2020-04-17 PROCEDURE — 87040 BLOOD CULTURE FOR BACTERIA: CPT | Mod: 91

## 2020-04-17 PROCEDURE — 83605 ASSAY OF LACTIC ACID: CPT

## 2020-04-17 PROCEDURE — 99285 EMERGENCY DEPT VISIT HI MDM: CPT

## 2020-04-17 PROCEDURE — 80053 COMPREHEN METABOLIC PANEL: CPT

## 2020-04-17 PROCEDURE — 36415 COLL VENOUS BLD VENIPUNCTURE: CPT

## 2020-04-17 PROCEDURE — 94760 N-INVAS EAR/PLS OXIMETRY 1: CPT

## 2020-04-17 PROCEDURE — A9270 NON-COVERED ITEM OR SERVICE: HCPCS | Performed by: EMERGENCY MEDICINE

## 2020-04-17 PROCEDURE — 700102 HCHG RX REV CODE 250 W/ 637 OVERRIDE(OP): Performed by: EMERGENCY MEDICINE

## 2020-04-17 PROCEDURE — 71045 X-RAY EXAM CHEST 1 VIEW: CPT

## 2020-04-17 RX ORDER — ONDANSETRON 4 MG/1
4 TABLET, ORALLY DISINTEGRATING ORAL EVERY 8 HOURS PRN
Qty: 10 TAB | Refills: 0 | Status: SHIPPED | OUTPATIENT
Start: 2020-04-17 | End: 2020-04-20

## 2020-04-17 RX ORDER — ONDANSETRON 2 MG/ML
4 INJECTION INTRAMUSCULAR; INTRAVENOUS ONCE
Status: COMPLETED | OUTPATIENT
Start: 2020-04-17 | End: 2020-04-17

## 2020-04-17 RX ORDER — ACETAMINOPHEN 325 MG/1
650 TABLET ORAL ONCE
Status: COMPLETED | OUTPATIENT
Start: 2020-04-17 | End: 2020-04-17

## 2020-04-17 RX ORDER — BUSPIRONE HYDROCHLORIDE 10 MG/1
TABLET ORAL
Qty: 60 TAB | Refills: 0 | Status: SHIPPED | OUTPATIENT
Start: 2020-04-17 | End: 2020-06-01

## 2020-04-17 RX ADMIN — ACETAMINOPHEN 650 MG: 325 TABLET, FILM COATED ORAL at 16:31

## 2020-04-17 RX ADMIN — ONDANSETRON 4 MG: 2 INJECTION INTRAMUSCULAR; INTRAVENOUS at 16:31

## 2020-04-17 ASSESSMENT — FIBROSIS 4 INDEX: FIB4 SCORE: 0.35

## 2020-04-17 NOTE — ED NOTES
"Pt BIB EMS from home. Pt sent for abnormal VBG per [facility] param[facility]. Pt reports increased SOB and chest tightness with expiration. Pt reports dx with pneumonia >1 week ago, completed IV and PO abx. Pt reports still having productive cough with green/frothy sputum. Pt had negative COVID test 1 week ago. Pt c/o generalized fatigue and bilateral leg weakness. Pt states \"tremors\" to legs. Pt unable to ambulate since Dec 2019. Pt A&OX4. VSS on arrival.   "

## 2020-04-17 NOTE — ED PROVIDER NOTES
"ED Provider Note    Scribed for Glynn Hinds M.D. by Noman Vela. 4/17/2020, 4:08 PM.    Primary care provider: Torres Brody M.D.  Means of arrival: [place]  History obtained from: Patient  History limited by: None    CHIEF COMPLAINT  Chief Complaint   Patient presents with   • Abnormal Labs       HPI  Kristin Balderrama is a wheelchair bound 29 y.o. female diagnosed with pneumonia several weeks ago who presents to the Emergency Department for evaluation due to an abnormal VBG per mobile health paramedic after experiencing shaking and urinary incontinence onset 4 hours ago. Patient says that the telehealth provider was worried about an infection secondary to her bed sores on her buttocks that were described to be \"bigger, red, hot, and peeling skin.\" Patient additionally experiences shortness of breath, productive cough with green sputum, nausea, vomiting, diarrhea, lower abdominal pain, generalized weakness, chest pain, buttock pain, fever with Tmax 100 °F a few days ago, and dysuria. She denies sore throat. Patient had negative COVID test 1 week ago. She does not know anyone with COVID-19. Patient finished her antibiotics for her pneumonia 2 days ago.     I verified that the patient was wearing a mask and I was wearing appropriate PPE every time I entered the room. The patient's mask was on the patient at all times during my encounter except for a brief view of the oropharynx.     REVIEW OF SYSTEMS  Pertinent positives include shaking, urinary incontinence, shortness of breath, productive cough with green sputum, nausea, vomiting, diarrhea, lower abdominal pain, generalized weakness, chest pain, buttock pain, fever, and dysuria. Pertinent negatives include sore throat. All other systems negative.    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    SURGICAL HISTORY   has a past surgical history that includes neuro dest facet l/s w/ig sngl (4/21/2015); recovery (1/27/2016); [place] by laparoscopy (8/29/2010); lumbar fusion anterior (8/21/2012); other cardiac surgery (1/2016); tonsillectomy; bowel stimulator insertion (7/13/2016); gastroscopy with balloon dilatation (N/A, 1/4/2017); muscle biopsy (Right, 1/26/2017); other abdominal surgery; and laminotomy.    SOCIAL HISTORY  Social History     Tobacco Use   • Smoking status: Never Smoker   • Smokeless tobacco: Never Used   Substance Use Topics   • Alcohol use: No     Alcohol/week: 0.0 oz   • Drug use: Not Currently     Frequency: 7.0 times per week     Types: Marijuana, Oral     Comment: Medicinal edible's      Social History     Substance and Sexual Activity   Drug Use Not Currently   • Frequency: 7.0 times per week   • Types: Marijuana, Oral    Comment: Medicinal edible's       FAMILY HISTORY  Family History   Problem Relation Age of Onset   • Hypertension Mother    • Sleep Apnea Mother    • Heart Disease Mother    • Other Mother         hypothryod   • Hypertension Maternal Uncle    • Heart Disease Maternal Grandmother    • Hypertension Maternal Grandmother    • No Known  Problems Sister    • Other Sister         Narcolepsy;fibromyalsia;bone;nerve   • Genitourinary () Problems Sister         endometriosis       CURRENT MEDICATIONS  Home Medications     Reviewed by Britt Brody R.N. (Registered Nurse) on 04/17/20 at 1501  Med List Status: Partial   Medication Last Dose Status   acetaZOLAMIDE (DIAMOX) 250 MG Tab 4/17/2020 Active   albuterol 108 (90 Base) MCG/ACT Aero Soln inhalation aerosol 4/17/2020 Active   aspirin EC (ECOTRIN) 81 MG Tablet Delayed Response 4/17/2020 Active   budesonide-formoterol (SYMBICORT) 160-4.5 MCG/ACT Aerosol 4/17/2020 Active   busPIRone (BUSPAR) 10 MG Tab tablet 4/17/2020 Active   Dulaglutide (TRULICITY) 1.5 MG/0.5ML Solution Pen-injector 4/17/2020 Active   etonogestrel (NEXPLANON) 68 MG Implant implant 4/17/2020 Active   fluoxetine (PROZAC) 40 MG capsule 4/17/2020 Active   furosemide (LASIX) 80 MG Tab 4/17/2020 Active   ibuprofen (MOTRIN) 600 MG Tab 4/17/2020 Active   ipratropium-albuterol (DUONEB) 0.5-2.5 (3) MG/3ML nebulizer solution 4/17/2020 Active   Ivabradine HCl (CORLANOR) 7.5 MG Tab 4/17/2020 Active   levothyroxine (SYNTHROID) 100 MCG Tab 4/17/2020 Active   Melatonin 10 MG Tab 4/16/2020 Active   modafinil (PROVIGIL) 200 MG Tab 4/17/2020 Active   mycophenolate (CELLCEPT) 500 MG tablet 4/17/2020 Active   ondansetron (ZOFRAN ODT) 4 MG TABLET DISPERSIBLE  Active   OXcarbazepine (TRILEPTAL) 150 MG Tab 4/17/2020 Active   potassium chloride SA (KDUR) 20 MEQ Tab CR 4/17/2020 Active   predniSONE 5 MG Tablet Delayed Response 4/17/2020 Active   pregabalin (LYRICA) 300 MG capsule 4/17/2020 Active   promethazine (PHENERGAN) 25 MG Suppos  Active   senna-docusate (PERICOLACE OR SENOKOT S) 8.6-50 MG Tab 4/17/2020 Active   tiotropium (SPIRIVA) 18 MCG Cap 4/17/2020 Active   traZODone (DESYREL) 100 MG Tab 4/16/2020 Active   vitamin D (VITAMIND D3) 1000 UNIT Tab 4/17/2020 Active   ziprasidone (GEODON) 40 MG Cap 4/17/2020 Active                ALLERGIES  Allergies  "  Allergen Reactions   • Cefdinir Shortness of Breath and Itching     Tolerated 1/18/17  Tolerates ceftriaxone  Tolerated augmentin 8/2019    • Depakote [Divalproex Sodium] Unspecified     Muscle spasms/muscle pain and weakness     • Doxycycline Anaphylaxis and Vomiting     RXN=unknown   • Amitriptyline Unspecified     Headaches     • Aripiprazole [Abilify] Unspecified     Headaches/muscle twitching     • Clindamycin Nausea     Even with food     • Flagyl [Metronidazole Hcl] Unspecified     \"eye problems\"     • Flomax [Tamsulosin Hydrochloride] Swelling   • Levaquin Unspecified     Severe muscle cramps in legs  RXN=unknown   • Metformin Unspecified     Increased lactic acid      • Tape Rash     Tears skin off  coban with Tegaderm tape ok intermittently  RXN=ongoing   • Vancomycin Itching     Pt becomes flushed in face and chest.   RXN=7/10/16   • Wound Dressing Adhesive Hives     By pt report   • Ciprofloxacin    • Depakote  [Divalproex Sodium]    • Hydromorphone Hcl    • Kdc:Metronidazole+Tartrazine    • Keflex Rash     Pt states she gets a rash with this medication  Tolerates ceftriaxone   • Levofloxacin Unspecified     Leg muscle cramps   • Penicillins        PHYSICAL EXAM  VITAL SIGNS: /66   Pulse 76   Temp 37.2 °C (99 °F) (Tympanic)   Resp (!) 21   Ht 1.651 m (5' 5\")   Wt 115.7 kg (255 lb)   SpO2 97%   BMI 42.43 kg/m²     Constitutional: Well developed, Well nourished, mild distress.   Cardiovascular: Normal heart rate, Normal rhythm, No murmurs, equal pulses.   Pulmonary: Diminished breath sounds bilaterally, No respiratory distress, No wheezing, No rales, No rhonchi.  Chest: No chest wall tenderness or deformity.   Abdomen: Obese, soft, tender over whole LLQ and suprapubically, No masses, no rebound, no guarding.   Musculoskeletal: No major deformities noted, No tenderness.   Skin: Warm, Dry, No erythema, No rash.   Neurologic: Alert & oriented x 3, Normal motor function,  No focal deficits " noted.   Psychiatric: Affect normal, Judgment normal, Mood normal.     LABS  Results for orders placed or performed during the hospital encounter of 04/17/20   CBC WITH DIFFERENTIAL   Result Value Ref Range    WBC 7.0 4.8 - 10.8 K/uL    RBC 4.32 4.20 - 5.40 M/uL    Hemoglobin 12.8 12.0 - 16.0 g/dL    Hematocrit 40.0 37.0 - 47.0 %    MCV 92.6 81.4 - 97.8 fL    MCH 29.6 27.0 - 33.0 pg    MCHC 32.0 (L) 33.6 - 35.0 g/dL    RDW 47.7 35.9 - 50.0 fL    Platelet Count 171 164 - 446 K/uL    MPV 12.0 9.0 - 12.9 fL    Neutrophils-Polys 85.90 (H) 44.00 - 72.00 %    Lymphocytes 8.70 (L) 22.00 - 41.00 %    Monocytes 4.40 0.00 - 13.40 %    Eosinophils 0.00 0.00 - 6.90 %    Basophils 0.10 0.00 - 1.80 %    Immature Granulocytes 0.90 0.00 - 0.90 %    Nucleated RBC 0.00 /100 WBC    Neutrophils (Absolute) 6.03 2.00 - 7.15 K/uL    Lymphs (Absolute) 0.61 (L) 1.00 - 4.80 K/uL    Monos (Absolute) 0.31 0.00 - 0.85 K/uL    Eos (Absolute) 0.00 0.00 - 0.51 K/uL    Baso (Absolute) 0.01 0.00 - 0.12 K/uL    Immature Granulocytes (abs) 0.06 0.00 - 0.11 K/uL    NRBC (Absolute) 0.00 K/uL   COMP METABOLIC PANEL   Result Value Ref Range    Sodium 140 135 - 145 mmol/L    Potassium 4.0 3.6 - 5.5 mmol/L    Chloride 114 (H) 96 - 112 mmol/L    Co2 15 (L) 20 - 33 mmol/L    Anion Gap 11.0 7.0 - 16.0    Glucose 123 (H) 65 - 99 mg/dL    Bun 13 8 - 22 mg/dL    Creatinine 0.76 0.50 - 1.40 mg/dL    Calcium 8.9 8.5 - 10.5 mg/dL    AST(SGOT) 18 12 - 45 U/L    ALT(SGPT) 43 2 - 50 U/L    Alkaline Phosphatase 56 30 - 99 U/L    Total Bilirubin 0.2 0.1 - 1.5 mg/dL    Albumin 4.1 3.2 - 4.9 g/dL    Total Protein 5.8 (L) 6.0 - 8.2 g/dL    Globulin 1.7 (L) 1.9 - 3.5 g/dL    A-G Ratio 2.4 g/dL   TROPONIN   Result Value Ref Range    Troponin T <6 6 - 19 ng/L   URINALYSIS CULTURE, IF INDICATED   Result Value Ref Range    Color Yellow     Character Clear     Specific Gravity 1.021 <1.035    Ph 6.0 5.0 - 8.0    Glucose Negative Negative mg/dL    Ketones Negative Negative mg/dL     Protein Negative Negative mg/dL    Bilirubin Negative Negative    Urobilinogen, Urine 1.0 Negative    Nitrite Negative Negative    Leukocyte Esterase Negative Negative    Occult Blood Negative Negative    Micro Urine Req see below    COVID/SARS CoV-2   Result Value Ref Range    COVID Order Status Received    VENOUS BLOOD GAS   Result Value Ref Range    Venous Bg Ph 7.31 7.31 - 7.45    Venous Bg Pco2 28.9 (L) 41.0 - 51.0 mmHg    Venous Bg Po2 63.0 (H) 25.0 - 40.0 mmHg    Venous Bg O2 Saturation 90.9 %    Venous Bg Hco3 14 (L) 24 - 28 mmol/L    Venous Bg Base Excess -11 mmol/L    Body Temp see below Centigrade   LACTIC ACID   Result Value Ref Range    Lactic Acid 1.6 0.5 - 2.0 mmol/L   ESTIMATED GFR   Result Value Ref Range    GFR If African American >60 >60 mL/min/1.73 m 2    GFR If Non African American >60 >60 mL/min/1.73 m 2   SARS-CoV-2, PCR (In-House)   Result Value Ref Range    SARS-CoV-2 Source NP Swab     SARS-CoV-2 by PCR Negative Negative   EKG (NOW)   Result Value Ref Range    Report       [facility] Emergency Dept.    Test Date:  2020  Pt Name:    HARLEY DE LA CRUZ              Department: ER  MRN:        [id number]                      Room:        05  Gender:     Female                       Technician: [id number]  :        [id number]                   Requested By:ARTUR HARP  Order #:    [id number]                    Reading MD: ARTUR HARP MD    Measurements  Intervals                                Axis  Rate:       70                           P:          9  KS:         148                          QRS:        21  QRSD:       94                           T:          18  QT:         416  QTc:        449    Interpretive Statements  SINUS RHYTHM  BORDERLINE T WAVE ABNORMALITIES  BASELINE WANDER IN LEAD(S) V3  Compared to ECG 04/15/2020 00:43:58  T-wave abnormality now present  ST (T wave) deviation no longer present  Possible ischemia no longer  present  Electronically Signed On 4- 18:18:44 PDT by ARTUR HARP MD        *Note: Due to a large number of results and/or encounters for the requested time period, some results have not been displayed. A complete set of results can be found in Results Review.      All labs reviewed by me.    RADIOLOGY  DX-CHEST-PORTABLE (1 VIEW)   Final Result         1. No acute cardiopulmonary abnormalities are identified.        The radiologist's interpretation of all radiological studies have been reviewed by me.    COURSE & MEDICAL DECISION MAKING  Pertinent Labs & Imaging studies reviewed. (See chart for details)    4:08 PM - Patient seen and examined at bedside. Patient will be treated with Tylenol 650 mg and Zofran 4 mg.  Ordered DX chest, lactic acid, VBG, blood culture, CBC with differential, CMP, troponin, UA culture, COVID/SARS CoV-2, and EKG to evaluate her symptoms. The differential diagnoses include but are not limited to: pneumonia, COVID-19, diverticulitis, UTI.     4:56 PM - Ordered SARS-CoV-2 PCR.    Patient was given Zofran for nausea with that she is no longer nauseated.  She was given Tylenol for pain.  Patient's labs are otherwise unremarkable she is only tender in the left lower quadrant.      Discussed with the patient all her lab results and they are all returned to normal.  She has negative for COVID.  This was done secondary fact the patient is possibly immune compromised.  On CellCept.    Medical Decision Making: This point time I think the patient most likely has gastroenteritis causing her nausea and vomiting.  Patient's chest x-ray is negative for any continued pneumonia.  Her white count is normal.  She is negative for COVID 19.  She has no pain or tenderness in the right lower quadrant I do not suspect appendicitis.  She does not have a fever here no signs of a urinary tract infection and certainly does not have a surgical abdomen.  I do not think CT of the abdomen pelvis is  warranted at this point time given negative labs.  Patient will be discharged home with Zofran to help with her vomiting.     The patient will return for new or worsening symptoms and is stable at the time of discharge.    The patient is referred to a primary physician for blood pressure management, diabetic screening, and for all other preventative health concerns.        DISPOSITION:  Patient will be discharged home in stable condition.    FOLLOW UP:  Torres Brody M.D.  [address] [place] [id number]  [phone]    Schedule an appointment as soon as possible for a visit in 3 days        OUTPATIENT MEDICATIONS:  New Prescriptions    ONDANSETRON (ZOFRAN ODT) 4 MG TABLET DISPERSIBLE    Take 1 Tab by mouth every 8 hours as needed.          FINAL IMPRESSION  1. Non-intractable vomiting with nausea, unspecified vomiting type    2. Weakness    3. Pressure injury of sacral region, stage 1          Noman RODRIGUEZ (Carolibsadie), am scribing for, and in the presence of, Glynn Hinds M.D.    Electronically signed by: Noman Vela (Eva), 4/17/2020    Glynn RODRIGUEZ M.D. personally performed the services described in this documentation, as scribed by Noman Vela in my presence, and it is both accurate and complete.    C    The note accurately reflects work and decisions made by me.  Glynn Hinds M.D.  4/17/2020  6:50 PM

## 2020-04-17 NOTE — ED NOTES
Pt assisted to use bedpan. Draw sheet changed. Pt resting on right side, per request. NAD. VSS. Will continue to monitor.

## 2020-04-17 NOTE — ED NOTES
Urine analysis obtained by straight catheter. Pt readjusted in bed, call light in reach. NAD. No needs at this time.

## 2020-04-18 NOTE — ED NOTES
D/C instructions provided to patient at bedside, verbalizes understanding and states plans for follow-up with PCP as recommended.   Scripts given.   All belongings accounted for, all questions answered at this time.

## 2020-04-18 NOTE — DISCHARGE PLANNING
MSW received notification from bedside RN. Pt needs [facility] home. Pt was just d/c on 4/15/20 by [facility]. Bedside RN confirmed address  Is [address] [place], [place] [address] [id number]. Also confirmed pt will have someone at the address to receive pt. Pt is max assist.     MSW called [facility] and arranged transport. [facility] placed call in will call- tentative transport time [phone]. MSW updated bedside RN.

## 2020-04-18 NOTE — DISCHARGE INSTRUCTIONS
Return if you are have increasing pain, shortness of breath, productive cough, severe vomiting, or blood in vomit or stool.

## 2020-04-18 NOTE — ED NOTES
Report received from Britt HASSAN to assume care. Introduced self to pt. Plan of care discussed. Pt resting in bed, VVS. Call light in reach. [facility] to transport pt to home

## 2020-04-20 ENCOUNTER — HOSPITAL ENCOUNTER (INPATIENT)
Facility: MEDICAL CENTER | Age: 30
LOS: 5 days | DRG: 123 | End: 2020-04-25
Attending: EMERGENCY MEDICINE | Admitting: INTERNAL MEDICINE
Payer: MEDICARE

## 2020-04-20 ENCOUNTER — APPOINTMENT (OUTPATIENT)
Dept: RADIOLOGY | Facility: MEDICAL CENTER | Age: 30
DRG: 123 | End: 2020-04-20
Attending: STUDENT IN AN ORGANIZED HEALTH CARE EDUCATION/TRAINING PROGRAM
Payer: MEDICARE

## 2020-04-20 DIAGNOSIS — H46.9 OPTIC NEURITIS: ICD-10-CM

## 2020-04-20 DIAGNOSIS — H57.12 EYE PAIN, LEFT: ICD-10-CM

## 2020-04-20 DIAGNOSIS — R53.1 WEAKNESS: ICD-10-CM

## 2020-04-20 PROBLEM — B37.2 INTERTRIGINOUS CANDIDIASIS: Status: ACTIVE | Noted: 2020-04-20

## 2020-04-20 PROBLEM — I47.10 SVT (SUPRAVENTRICULAR TACHYCARDIA) (HCC): Status: ACTIVE | Noted: 2019-04-10

## 2020-04-20 PROBLEM — J98.4 CHRONIC LUNG DISEASE: Status: ACTIVE | Noted: 2020-04-20

## 2020-04-20 PROBLEM — J98.4 RESTRICTIVE LUNG DISEASE: Status: ACTIVE | Noted: 2020-04-20

## 2020-04-20 PROBLEM — Z86.79 HISTORY OF SUPRAVENTRICULAR TACHYCARDIA: Status: ACTIVE | Noted: 2020-04-20

## 2020-04-20 LAB
ALBUMIN SERPL BCP-MCNC: 4.2 G/DL (ref 3.2–4.9)
ALBUMIN/GLOB SERPL: 2 G/DL
ALP SERPL-CCNC: 55 U/L (ref 30–99)
ALT SERPL-CCNC: 34 U/L (ref 2–50)
ANION GAP SERPL CALC-SCNC: 15 MMOL/L (ref 7–16)
APPEARANCE UR: ABNORMAL
AST SERPL-CCNC: 11 U/L (ref 12–45)
BACTERIA #/AREA URNS HPF: NEGATIVE /HPF
BACTERIA BLD CULT: NORMAL
BACTERIA BLD CULT: NORMAL
BASOPHILS # BLD AUTO: 0.1 % (ref 0–1.8)
BASOPHILS # BLD: 0.01 K/UL (ref 0–0.12)
BILIRUB SERPL-MCNC: 0.3 MG/DL (ref 0.1–1.5)
BILIRUB UR QL STRIP.AUTO: NEGATIVE
BUN SERPL-MCNC: 11 MG/DL (ref 8–22)
CALCIUM SERPL-MCNC: 9.1 MG/DL (ref 8.5–10.5)
CHLORIDE SERPL-SCNC: 108 MMOL/L (ref 96–112)
CO2 SERPL-SCNC: 17 MMOL/L (ref 20–33)
COLOR UR: YELLOW
CREAT SERPL-MCNC: 0.67 MG/DL (ref 0.5–1.4)
CRP SERPL HS-MCNC: 0.27 MG/DL (ref 0–0.75)
EOSINOPHIL # BLD AUTO: 0.05 K/UL (ref 0–0.51)
EOSINOPHIL NFR BLD: 0.7 % (ref 0–6.9)
EPI CELLS #/AREA URNS HPF: ABNORMAL /HPF
ERYTHROCYTE [DISTWIDTH] IN BLOOD BY AUTOMATED COUNT: 48.4 FL (ref 35.9–50)
ERYTHROCYTE [SEDIMENTATION RATE] IN BLOOD BY WESTERGREN METHOD: 5 MM/HOUR (ref 0–20)
GLOBULIN SER CALC-MCNC: 2.1 G/DL (ref 1.9–3.5)
GLUCOSE SERPL-MCNC: 93 MG/DL (ref 65–99)
GLUCOSE UR STRIP.AUTO-MCNC: NEGATIVE MG/DL
HCT VFR BLD AUTO: 40.2 % (ref 37–47)
HGB BLD-MCNC: 13.3 G/DL (ref 12–16)
HYALINE CASTS #/AREA URNS LPF: ABNORMAL /LPF
IMM GRANULOCYTES # BLD AUTO: 0.03 K/UL (ref 0–0.11)
IMM GRANULOCYTES NFR BLD AUTO: 0.4 % (ref 0–0.9)
KETONES UR STRIP.AUTO-MCNC: NEGATIVE MG/DL
LEUKOCYTE ESTERASE UR QL STRIP.AUTO: NEGATIVE
LYMPHOCYTES # BLD AUTO: 1.38 K/UL (ref 1–4.8)
LYMPHOCYTES NFR BLD: 19.8 % (ref 22–41)
MAGNESIUM SERPL-MCNC: 2.1 MG/DL (ref 1.5–2.5)
MCH RBC QN AUTO: 30.4 PG (ref 27–33)
MCHC RBC AUTO-ENTMCNC: 33.1 G/DL (ref 33.6–35)
MCV RBC AUTO: 92 FL (ref 81.4–97.8)
MICRO URNS: ABNORMAL
MONOCYTES # BLD AUTO: 0.55 K/UL (ref 0–0.85)
MONOCYTES NFR BLD AUTO: 7.9 % (ref 0–13.4)
NEUTROPHILS # BLD AUTO: 4.95 K/UL (ref 2–7.15)
NEUTROPHILS NFR BLD: 71.1 % (ref 44–72)
NITRITE UR QL STRIP.AUTO: NEGATIVE
NRBC # BLD AUTO: 0 K/UL
NRBC BLD-RTO: 0 /100 WBC
PH UR STRIP.AUTO: 7 [PH] (ref 5–8)
PLATELET # BLD AUTO: 175 K/UL (ref 164–446)
PMV BLD AUTO: 11.6 FL (ref 9–12.9)
POTASSIUM SERPL-SCNC: 3.7 MMOL/L (ref 3.6–5.5)
PROT SERPL-MCNC: 6.3 G/DL (ref 6–8.2)
PROT UR QL STRIP: NEGATIVE MG/DL
RBC # BLD AUTO: 4.37 M/UL (ref 4.2–5.4)
RBC # URNS HPF: ABNORMAL /HPF
RBC UR QL AUTO: NEGATIVE
SIGNIFICANT IND 70042: NORMAL
SIGNIFICANT IND 70042: NORMAL
SITE SITE: NORMAL
SITE SITE: NORMAL
SODIUM SERPL-SCNC: 140 MMOL/L (ref 135–145)
SOURCE SOURCE: NORMAL
SOURCE SOURCE: NORMAL
SP GR UR STRIP.AUTO: 1.02
TROPONIN T SERPL-MCNC: <6 NG/L (ref 6–19)
UROBILINOGEN UR STRIP.AUTO-MCNC: 1 MG/DL
WBC # BLD AUTO: 7 K/UL (ref 4.8–10.8)
WBC #/AREA URNS HPF: ABNORMAL /HPF

## 2020-04-20 PROCEDURE — 81001 URINALYSIS AUTO W/SCOPE: CPT

## 2020-04-20 PROCEDURE — 700105 HCHG RX REV CODE 258: Performed by: EMERGENCY MEDICINE

## 2020-04-20 PROCEDURE — 700102 HCHG RX REV CODE 250 W/ 637 OVERRIDE(OP): Performed by: STUDENT IN AN ORGANIZED HEALTH CARE EDUCATION/TRAINING PROGRAM

## 2020-04-20 PROCEDURE — 99223 1ST HOSP IP/OBS HIGH 75: CPT | Mod: GC | Performed by: INTERNAL MEDICINE

## 2020-04-20 PROCEDURE — 83735 ASSAY OF MAGNESIUM: CPT

## 2020-04-20 PROCEDURE — A9270 NON-COVERED ITEM OR SERVICE: HCPCS | Performed by: STUDENT IN AN ORGANIZED HEALTH CARE EDUCATION/TRAINING PROGRAM

## 2020-04-20 PROCEDURE — 85025 COMPLETE CBC W/AUTO DIFF WBC: CPT

## 2020-04-20 PROCEDURE — 87040 BLOOD CULTURE FOR BACTERIA: CPT | Mod: 91

## 2020-04-20 PROCEDURE — 80053 COMPREHEN METABOLIC PANEL: CPT

## 2020-04-20 PROCEDURE — 770006 HCHG ROOM/CARE - MED/SURG/GYN SEMI*

## 2020-04-20 PROCEDURE — 84484 ASSAY OF TROPONIN QUANT: CPT

## 2020-04-20 PROCEDURE — 700105 HCHG RX REV CODE 258: Performed by: STUDENT IN AN ORGANIZED HEALTH CARE EDUCATION/TRAINING PROGRAM

## 2020-04-20 PROCEDURE — 96375 TX/PRO/DX INJ NEW DRUG ADDON: CPT

## 2020-04-20 PROCEDURE — 85652 RBC SED RATE AUTOMATED: CPT

## 2020-04-20 PROCEDURE — 99285 EMERGENCY DEPT VISIT HI MDM: CPT

## 2020-04-20 PROCEDURE — 96376 TX/PRO/DX INJ SAME DRUG ADON: CPT

## 2020-04-20 PROCEDURE — 70450 CT HEAD/BRAIN W/O DYE: CPT

## 2020-04-20 PROCEDURE — 700111 HCHG RX REV CODE 636 W/ 250 OVERRIDE (IP): Performed by: EMERGENCY MEDICINE

## 2020-04-20 PROCEDURE — 700111 HCHG RX REV CODE 636 W/ 250 OVERRIDE (IP): Performed by: STUDENT IN AN ORGANIZED HEALTH CARE EDUCATION/TRAINING PROGRAM

## 2020-04-20 PROCEDURE — 96374 THER/PROPH/DIAG INJ IV PUSH: CPT

## 2020-04-20 PROCEDURE — 86140 C-REACTIVE PROTEIN: CPT

## 2020-04-20 PROCEDURE — 36415 COLL VENOUS BLD VENIPUNCTURE: CPT

## 2020-04-20 RX ORDER — SODIUM CHLORIDE 9 MG/ML
1000 INJECTION, SOLUTION INTRAVENOUS ONCE
Status: COMPLETED | OUTPATIENT
Start: 2020-04-20 | End: 2020-04-20

## 2020-04-20 RX ORDER — OXCARBAZEPINE 150 MG/1
150 TABLET, FILM COATED ORAL 2 TIMES DAILY
Status: DISCONTINUED | OUTPATIENT
Start: 2020-04-20 | End: 2020-04-25 | Stop reason: HOSPADM

## 2020-04-20 RX ORDER — MYCOPHENOLATE MOFETIL 250 MG/1
1000 CAPSULE ORAL 2 TIMES DAILY
Status: DISCONTINUED | OUTPATIENT
Start: 2020-04-20 | End: 2020-04-25 | Stop reason: HOSPADM

## 2020-04-20 RX ORDER — ACETAZOLAMIDE 250 MG/1
1000 TABLET ORAL 2 TIMES DAILY
Status: DISCONTINUED | OUTPATIENT
Start: 2020-04-20 | End: 2020-04-23

## 2020-04-20 RX ORDER — BISACODYL 10 MG
10 SUPPOSITORY, RECTAL RECTAL
Status: DISCONTINUED | OUTPATIENT
Start: 2020-04-20 | End: 2020-04-25 | Stop reason: HOSPADM

## 2020-04-20 RX ORDER — ONDANSETRON 2 MG/ML
4 INJECTION INTRAMUSCULAR; INTRAVENOUS EVERY 4 HOURS PRN
Status: DISCONTINUED | OUTPATIENT
Start: 2020-04-20 | End: 2020-04-25 | Stop reason: HOSPADM

## 2020-04-20 RX ORDER — TRAZODONE HYDROCHLORIDE 100 MG/1
50 TABLET ORAL EVERY EVENING
Status: DISCONTINUED | OUTPATIENT
Start: 2020-04-20 | End: 2020-04-25 | Stop reason: HOSPADM

## 2020-04-20 RX ORDER — MODAFINIL 100 MG/1
200 TABLET ORAL EVERY MORNING
Status: DISCONTINUED | OUTPATIENT
Start: 2020-04-21 | End: 2020-04-25 | Stop reason: HOSPADM

## 2020-04-20 RX ORDER — ALBUTEROL SULFATE 90 UG/1
2 AEROSOL, METERED RESPIRATORY (INHALATION) EVERY 6 HOURS PRN
Status: DISCONTINUED | OUTPATIENT
Start: 2020-04-20 | End: 2020-04-25 | Stop reason: HOSPADM

## 2020-04-20 RX ORDER — AMOXICILLIN 250 MG
2 CAPSULE ORAL 2 TIMES DAILY
COMMUNITY
End: 2020-07-10

## 2020-04-20 RX ORDER — BUDESONIDE AND FORMOTEROL FUMARATE DIHYDRATE 160; 4.5 UG/1; UG/1
2 AEROSOL RESPIRATORY (INHALATION) 2 TIMES DAILY
Status: DISCONTINUED | OUTPATIENT
Start: 2020-04-20 | End: 2020-04-25 | Stop reason: HOSPADM

## 2020-04-20 RX ORDER — NYSTATIN 100000 [USP'U]/G
POWDER TOPICAL 2 TIMES DAILY
Status: DISCONTINUED | OUTPATIENT
Start: 2020-04-20 | End: 2020-04-25 | Stop reason: HOSPADM

## 2020-04-20 RX ORDER — MORPHINE SULFATE 4 MG/ML
4 INJECTION, SOLUTION INTRAMUSCULAR; INTRAVENOUS ONCE
Status: COMPLETED | OUTPATIENT
Start: 2020-04-20 | End: 2020-04-20

## 2020-04-20 RX ORDER — VITAMIN B COMPLEX
1000 TABLET ORAL DAILY
Status: DISCONTINUED | OUTPATIENT
Start: 2020-04-21 | End: 2020-04-25 | Stop reason: HOSPADM

## 2020-04-20 RX ORDER — POLYETHYLENE GLYCOL 3350 17 G/17G
1 POWDER, FOR SOLUTION ORAL
Status: DISCONTINUED | OUTPATIENT
Start: 2020-04-20 | End: 2020-04-25 | Stop reason: HOSPADM

## 2020-04-20 RX ORDER — LEVOTHYROXINE SODIUM 0.1 MG/1
100 TABLET ORAL
Status: DISCONTINUED | OUTPATIENT
Start: 2020-04-21 | End: 2020-04-25 | Stop reason: HOSPADM

## 2020-04-20 RX ORDER — ONDANSETRON 2 MG/ML
4 INJECTION INTRAMUSCULAR; INTRAVENOUS ONCE
Status: COMPLETED | OUTPATIENT
Start: 2020-04-20 | End: 2020-04-20

## 2020-04-20 RX ORDER — FUROSEMIDE 40 MG/1
80 TABLET ORAL
Status: DISCONTINUED | OUTPATIENT
Start: 2020-04-20 | End: 2020-04-25 | Stop reason: HOSPADM

## 2020-04-20 RX ORDER — ONDANSETRON 4 MG/1
4 TABLET, ORALLY DISINTEGRATING ORAL EVERY 6 HOURS PRN
Status: DISCONTINUED | OUTPATIENT
Start: 2020-04-20 | End: 2020-04-20

## 2020-04-20 RX ORDER — BUSPIRONE HYDROCHLORIDE 10 MG/1
10 TABLET ORAL 2 TIMES DAILY
Status: DISCONTINUED | OUTPATIENT
Start: 2020-04-20 | End: 2020-04-25 | Stop reason: HOSPADM

## 2020-04-20 RX ORDER — ZIPRASIDONE HYDROCHLORIDE 40 MG/1
40 CAPSULE ORAL DAILY
Status: DISCONTINUED | OUTPATIENT
Start: 2020-04-21 | End: 2020-04-25 | Stop reason: HOSPADM

## 2020-04-20 RX ORDER — ACETAZOLAMIDE 250 MG/1
1000 TABLET ORAL 2 TIMES DAILY
Status: ON HOLD | COMMUNITY
End: 2020-04-25 | Stop reason: SDUPTHER

## 2020-04-20 RX ORDER — AMOXICILLIN 250 MG
2 CAPSULE ORAL 2 TIMES DAILY
Status: DISCONTINUED | OUTPATIENT
Start: 2020-04-20 | End: 2020-04-25 | Stop reason: HOSPADM

## 2020-04-20 RX ORDER — FLUOXETINE HYDROCHLORIDE 20 MG/1
40 CAPSULE ORAL DAILY
Status: DISCONTINUED | OUTPATIENT
Start: 2020-04-21 | End: 2020-04-25 | Stop reason: HOSPADM

## 2020-04-20 RX ORDER — IPRATROPIUM BROMIDE AND ALBUTEROL SULFATE 2.5; .5 MG/3ML; MG/3ML
3 SOLUTION RESPIRATORY (INHALATION) EVERY 6 HOURS PRN
Status: DISCONTINUED | OUTPATIENT
Start: 2020-04-20 | End: 2020-04-25 | Stop reason: HOSPADM

## 2020-04-20 RX ORDER — PHENOL 1.4 %
10 AEROSOL, SPRAY (ML) MUCOUS MEMBRANE EVERY EVENING
Status: DISCONTINUED | OUTPATIENT
Start: 2020-04-20 | End: 2020-04-20

## 2020-04-20 RX ORDER — PREGABALIN 150 MG/1
300 CAPSULE ORAL 2 TIMES DAILY
Status: DISCONTINUED | OUTPATIENT
Start: 2020-04-20 | End: 2020-04-25 | Stop reason: HOSPADM

## 2020-04-20 RX ADMIN — ONDANSETRON HYDROCHLORIDE 4 MG: 2 INJECTION, SOLUTION INTRAMUSCULAR; INTRAVENOUS at 17:24

## 2020-04-20 RX ADMIN — ACETAZOLAMIDE 1000 MG: 250 TABLET ORAL at 22:57

## 2020-04-20 RX ADMIN — MORPHINE SULFATE 4 MG: 4 INJECTION INTRAVENOUS at 19:00

## 2020-04-20 RX ADMIN — MORPHINE SULFATE 4 MG: 4 INJECTION INTRAVENOUS at 17:24

## 2020-04-20 RX ADMIN — OXCARBAZEPINE 150 MG: 150 TABLET, FILM COATED ORAL at 22:57

## 2020-04-20 RX ADMIN — SODIUM CHLORIDE 1000 ML: 9 INJECTION, SOLUTION INTRAVENOUS at 17:24

## 2020-04-20 RX ADMIN — ONDANSETRON HYDROCHLORIDE 4 MG: 2 INJECTION INTRAMUSCULAR; INTRAVENOUS at 22:57

## 2020-04-20 RX ADMIN — TRAZODONE HYDROCHLORIDE 50 MG: 100 TABLET ORAL at 22:26

## 2020-04-20 RX ADMIN — BUSPIRONE HYDROCHLORIDE 10 MG: 10 TABLET ORAL at 22:28

## 2020-04-20 RX ADMIN — SODIUM CHLORIDE 250 MG: 9 INJECTION, SOLUTION INTRAVENOUS at 22:30

## 2020-04-20 RX ADMIN — SENNOSIDES AND DOCUSATE SODIUM 2 TABLET: 8.6; 5 TABLET ORAL at 22:27

## 2020-04-20 ASSESSMENT — ENCOUNTER SYMPTOMS
TREMORS: 1
FEVER: 0
PALPITATIONS: 0
EYE PAIN: 1
HEADACHES: 0
NECK PAIN: 0
DIZZINESS: 0
SEIZURES: 0
SHORTNESS OF BREATH: 1
CLAUDICATION: 0
CHILLS: 0
WEIGHT LOSS: 0
SPUTUM PRODUCTION: 0
COUGH: 1
HEARTBURN: 0
PHOTOPHOBIA: 1
FOCAL WEAKNESS: 0
HEMOPTYSIS: 0
SENSORY CHANGE: 1
DOUBLE VISION: 1
WEAKNESS: 0
BLURRED VISION: 1
DIARRHEA: 0
ORTHOPNEA: 0
EYE REDNESS: 0
VOMITING: 0
ABDOMINAL PAIN: 0
TINGLING: 1
SPEECH CHANGE: 0
NAUSEA: 0
EYE DISCHARGE: 0
LOSS OF CONSCIOUSNESS: 0
DEPRESSION: 1
MYALGIAS: 0
WHEEZING: 0

## 2020-04-20 ASSESSMENT — LIFESTYLE VARIABLES: DO YOU DRINK ALCOHOL: NO

## 2020-04-20 ASSESSMENT — FIBROSIS 4 INDEX: FIB4 SCORE: 0.48

## 2020-04-20 NOTE — ED PROVIDER NOTES
"ED Provider Note    CHIEF COMPLAINT  Chief Complaint   Patient presents with   • Eye Pain     L side, history of neuromyelitis optica       HPI  Kristin Balderrama is a 29 y.o. female who presents today with a chief complaint of left-sided eye swelling and pain.  She states duration has been since midnight last night.  It is deep the pain is a 13/10.  It is behind her eye going\" into my optic nerve\".  States surrounding facial pain as well as also some associated facial swelling.  She complains of decreased vision in that eye as well    I was able to pull this up from the old chart and old neurology consult.    \"One is chronic relapsing bilateral optic neuritis in which he sees neuropathy ophthalmology for she is currently on CellCept and prednisone for this. She had multiple admissions for IV Solu-Medrol for flareups last one being in May 2019. \"    Also complains of bilateral leg weakness.  She states she usually gets around in a wheelchair she is able to get off the bathroom after some effort but now she is unable to get help.  There is always some social stresses going on with the COVID-19 isolation should be able to isolate in addition she has a family member at home who \"is dying\" and any time you \"rub up against him he screams in pain\".  She believes that her grandmother and her mother have been able to attend to him only and unable to attend to her as much.    Please note that she is also been diagnosed with conversion disorder in which her legs are both weak at one hospitalization.    REVIEW OF SYSTEMS  General itching in the left eye.  Double vision.  Left eye pain left eyelid swelling.  Ear nose throat: No sore throat or  trouble swallowing.  Pulmonary: No shortness of breath or cough.  Cardiovascular: No chest pain or chest pressure.  GI: No abdominal pain nausea or vomiting.  : No dysuria or hematuria  Dermatologic: Chronic rash under both armpits which she states are \"shingles\".  Neurologic: " "Weakness in both legs which is worse..  Psychiatric: She states she is been anxious and stressful    All other systems are negative       PAST MEDICAL HISTORY  Past Medical History:   Diagnosis Date   • Sinus tachycardia 10/31/2013   • Pain 08-15-12    back, 7/10   • Pneumonia 2012   • Chronic UTI 9/18/2010   • Abdominal pain    • Anginal syndrome     random chest pain especially with tachycardia   • Apnea, sleep    • Arrhythmia     \"sinus tachycardia\", cariologist, Dr. Kumar; ablation 2/2016   • Arthritis     osteo   • ASTHMA     when around smoke   • Atrial fibrillation (HCC)    • Back pain    • Borderline personality disorder (HCC)    • Breath shortness     with tachycardia   • Bronchitis    • Cardiac arrhythmia    • Chickenpox    • Cough    • Daytime sleepiness    • Depression    • Diabetes (HCC)    • Diarrhea    • Disorder of thyroid    • Fall    • Fatigue    • Frequent headaches    • Gasping for breath    • Gynecological disorder     PCOS   • Headache(784.0)    • Heart burn    • History of falling    • Hypertension    • Indigestion    • Migraine    • 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HCC)    • 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HCC)    • Nausea    • Obesity    • Painful joint    • PCOS (polycystic ovarian syndrome)    • Psychosis (HCC)    • Renal disorder     \"kidney disease, stage 1\" nephrologist, Dr. 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   • Urinary bladder disorder     Suprapubic cath   • Urinary incontinence    • Weakness    • Wears glasses        FAMILY HISTORY  Family History   Problem Relation Age of Onset   • Hypertension Mother    • Sleep Apnea Mother    • Heart Disease Mother    • Other Mother         hypothryod   • Hypertension Maternal Uncle    • Heart Disease Maternal Grandmother    • Hypertension Maternal Grandmother  "   • No Known Problems Sister    • Other Sister         Narcolepsy;fibromyalsia;bone;nerve   • Genitourinary () Problems Sister         endometriosis       SOCIAL HISTORY  Social History     Socioeconomic History   • Marital status: Single     Spouse name: Not on file   • Number of children: Not on file   • Years of education: Not on file   • Highest education level: Not on file   Occupational History   • Not on file   Social Needs   • Financial resource strain: Not on file   • Food insecurity     Worry: Not on file     Inability: Not on file   • Transportation needs     Medical: Not on file     Non-medical: Not on file   Tobacco Use   • Smoking status: Never Smoker   • Smokeless tobacco: Never Used   Substance and Sexual Activity   • Alcohol use: No     Alcohol/week: 0.0 oz   • Drug use: Not Currently     Frequency: 7.0 times per week     Types: Marijuana, Oral     Comment: Medicinal edible's   • Sexual activity: Not Currently     Birth control/protection: Pill   Lifestyle   • Physical activity     Days per week: Not on file     Minutes per session: Not on file   • Stress: Not on file   Relationships   • Social connections     Talks on phone: Not on file     Gets together: Not on file     Attends [employer] service: Not on file     Active member of club or organization: Not on file     Attends meetings of clubs or organizations: Not on file     Relationship status: Not on file   • Intimate partner violence     Fear of current or ex partner: Not on file     Emotionally abused: Not on file     Physically abused: Not on file     Forced sexual activity: Not on file   Other Topics Concern   • Not on file   Social History Narrative    ** Merged History Encounter **            SURGICAL HISTORY  Past Surgical History:   Procedure Laterality Date   • MUSCLE BIOPSY Right 1/26/2017    Procedure: MUSCLE BIOPSY - THIGH;  Surgeon: Isidro Vigil M.D.;  Location: SURGERY [facility];  Service:    • GASTROSCOPY WITH BALLOON  DILATATION N/A 1/4/2017    Procedure: GASTROSCOPY WITH DILATATION;  Surgeon: Torres Vargas M.D.;  Location: SURGERY [facility];  Service:    • BOWEL STIMULATOR INSERTION  7/13/2016    Procedure: BOWEL STIMULATOR INSERTION FOR PERMANENT INTERSTIM SACRAL IMPLANT;  Surgeon: Joe Noyola M.D.;  Location: SURGERY [facility];  Service:    • RECOVERY  1/27/2016    Procedure: CATH LAB EP STUDY WITH SINUS NODE MODIFICATION LA;  Surgeon: Recoveryonly Surgery;  Location: SURGERY PRE-POST PROC UNIT [facility];  Service:    • OTHER CARDIAC SURGERY  1/2016    cardiac ablation   • NEURO DEST FACET L/S W/IG SNGL  4/21/2015    Performed by Reza Tabor at SURGERY [facility]   • LUMBAR FUSION ANTERIOR  8/21/2012    Performed by SUSIE SAWANT at SURGERY [facility]S   • ALYSSA BY LAPAROSCOPY  8/29/2010    Performed by SHAYY JOHNS at SURGERY [facility] ORS   • LAMINOTOMY     • OTHER ABDOMINAL SURGERY     • TONSILLECTOMY      tonsillectomy       CURRENT MEDICATIONS  No current facility-administered medications on file prior to encounter.      Current Outpatient Medications on File Prior to Encounter   Medication Sig Dispense Refill   • busPIRone (BUSPAR) 10 MG Tab tablet TAKE ONE TABLET BY MOUTH TWICE DAILY 60 Tab 0   • ondansetron (ZOFRAN ODT) 4 MG TABLET DISPERSIBLE Take 1 Tab by mouth every 8 hours as needed. 10 Tab 0   • ibuprofen (MOTRIN) 600 MG Tab Take 1 Tab by mouth every 6 hours as needed. 30 Tab 0   • Ivabradine HCl (CORLANOR) 7.5 MG Tab Take 7.5 mg by mouth 2 Times a Day. Needs to schedule follow up with Dr. Wilde 180 Tab 1   • acetaZOLAMIDE (DIAMOX) 250 MG Tab Take 2 Tabs by mouth 2 times a day AND 1 Tab 2 times a day. 90 Tab 2   • predniSONE 5 MG Tablet Delayed Response Take 5 mg by mouth every day. 30 Tab 2   • levothyroxine (SYNTHROID) 100 MCG Tab Take 1 Tab by mouth Every morning on an empty stomach. 30 Tab 2   • vitamin D (VITAMIND D3) 1000 UNIT Tab Take 1 Tab by mouth every day. 60 Tab 0    • modafinil (PROVIGIL) 200 MG Tab Take 1 Tab by mouth every morning for 60 days. 30 Tab 1   • fluoxetine (PROZAC) 40 MG capsule TAKE ONE CAPSULE BY MOUTH ONCE A DAY 30 Cap 0   • pregabalin (LYRICA) 300 MG capsule Take 300 mg by mouth 2 times a day.     • ziprasidone (GEODON) 40 MG Cap TAKE ONE CAPSULE BY MOUTH TWICE DAILY 60 Cap 0   • OXcarbazepine (TRILEPTAL) 150 MG Tab Take 1 Tab by mouth 2 Times a Day. 60 Tab 2   • promethazine (PHENERGAN) 25 MG Suppos Insert 1 Suppository in rectum every 6 hours as needed for Nausea/Vomiting. 10 Suppository 0   • traZODone (DESYREL) 100 MG Tab Take 0.5 Tabs by mouth every evening. 30 Tab 3   • senna-docusate (PERICOLACE OR SENOKOT S) 8.6-50 MG Tab Take 2 Tabs by mouth 2 times a day as needed for Constipation.     • budesonide-formoterol (SYMBICORT) 160-4.5 MCG/ACT Aerosol Inhale 2 Puffs by mouth 2 Times a Day.     • aspirin EC (ECOTRIN) 81 MG Tablet Delayed Response Take 81 mg by mouth every day.     • ondansetron (ZOFRAN ODT) 4 MG TABLET DISPERSIBLE Take 4 mg by mouth every 6 hours as needed for Nausea.     • potassium chloride SA (KDUR) 20 MEQ Tab CR Take 20 mEq by mouth 2 times a day.     • tiotropium (SPIRIVA) 18 MCG Cap Inhale 1 Cap by mouth every day. 90 Cap 3   • ipratropium-albuterol (DUONEB) 0.5-2.5 (3) MG/3ML nebulizer solution 3 mL by Nebulization route every 6 hours as needed for Shortness of Breath. 60 Bullet 1   • etonogestrel (NEXPLANON) 68 MG Implant implant Inject 1 Each as instructed Once.     • mycophenolate (CELLCEPT) 500 MG tablet Take 1,000 mg by mouth 2 times a day.     • Dulaglutide (TRULICITY) 1.5 MG/0.5ML Solution Pen-injector Inject 1.5 mg as instructed every Friday.     • albuterol 108 (90 Base) MCG/ACT Aero Soln inhalation aerosol Inhale 2 Puffs by mouth every 6 hours as needed for Shortness of Breath.     • Melatonin 10 MG Tab Take 10 mg by mouth every evening.     • furosemide (LASIX) 80 MG Tab Take 80 mg by mouth 1 time daily as needed.    "      ALLERGIES  Allergies   Allergen Reactions   • Cefdinir Shortness of Breath and Itching     Tolerated 1/18/17  Tolerates ceftriaxone  Tolerated augmentin 8/2019    • Depakote [Divalproex Sodium] Unspecified     Muscle spasms/muscle pain and weakness     • Doxycycline Anaphylaxis and Vomiting     RXN=unknown   • Amitriptyline Unspecified     Headaches     • Aripiprazole [Abilify] Unspecified     Headaches/muscle twitching     • Clindamycin Nausea     Even with food     • Flagyl [Metronidazole Hcl] Unspecified     \"eye problems\"     • Flomax [Tamsulosin Hydrochloride] Swelling   • Levaquin Unspecified     Severe muscle cramps in legs  RXN=unknown   • Metformin Unspecified     Increased lactic acid      • Tape Rash     Tears skin off  coban with Tegaderm tape ok intermittently  RXN=ongoing   • Vancomycin Itching     Pt becomes flushed in face and chest.   RXN=7/10/16   • Wound Dressing Adhesive Hives     By pt report   • Ciprofloxacin    • Depakote  [Divalproex Sodium]    • Hydromorphone Hcl    • Kdc:Metronidazole+Tartrazine    • Keflex Rash     Pt states she gets a rash with this medication  Tolerates ceftriaxone   • Levofloxacin Unspecified     Leg muscle cramps   • Penicillins        PHYSICAL EXAM  VITAL SIGNS: /70   Pulse 71   Temp 36.1 °C (96.9 °F) (Temporal)   Resp 20   Ht 1.651 m (5' 5\")   Wt 115.7 kg (255 lb 1.2 oz)   SpO2 99%   BMI 42.45 kg/m²       Constitutional: Obese female no acute distress  HENT: Normocephalic atraumatic.  Oropharynx shows no exudates no erythema.  Mask was worn throughout the entire time in visit to the ER and taken out for just a moment for oropharynx examination  Eyes: Pupils equal round reactive light anicteric sclera extraocular muscles are intact with some pain she states she has decreased vision and only can see fingers closely.  She cannot count my fingers more than 2 feet away.  There is no tenderness around the orbital area.  Is able to do a slight funduscopic " exam showed no gross abnormalities optic disc cannot be formally evaluated the patient turned away in pain.  Neck: Normal range of motion, No tenderness, Supple, No stridor.   Cardiovascular: Normal heart rate, Normal rhythm, No murmurs, No rubs, No gallops.   Thorax & Lungs: Normal breath sounds, No respiratory distress, No wheezing, No chest tenderness.   Abdomen: Bowel sounds normal, Soft, No tenderness, No masses, No pulsatile masses.   Skin: Patches and plaques into the axilla bilaterally no petechia no ecchymosis no vesicles noted.  In the buttock area there along the midline appears to be slightly denuded skin with chronic irritation papular rash that coalesces.  Back: No tenderness, No CVA tenderness.   Extremities: Intact distal pulses, No edema, No tenderness, No cyanosis, No clubbing.   Neurologic: She has almost 3 out of 5 strength in lower extremities she barely might get him off the ground.  Sensation light touch in the lower extremity  Psychiatric: Affect normal, Judgment normal, Mood normal.         RADIOLOGY/PROCEDURES  Results for orders placed or performed during the hospital encounter of 04/20/20   CBC WITH DIFFERENTIAL   Result Value Ref Range    WBC 7.0 4.8 - 10.8 K/uL    RBC 4.37 4.20 - 5.40 M/uL    Hemoglobin 13.3 12.0 - 16.0 g/dL    Hematocrit 40.2 37.0 - 47.0 %    MCV 92.0 81.4 - 97.8 fL    MCH 30.4 27.0 - 33.0 pg    MCHC 33.1 (L) 33.6 - 35.0 g/dL    RDW 48.4 35.9 - 50.0 fL    Platelet Count 175 164 - 446 K/uL    MPV 11.6 9.0 - 12.9 fL    Neutrophils-Polys 71.10 44.00 - 72.00 %    Lymphocytes 19.80 (L) 22.00 - 41.00 %    Monocytes 7.90 0.00 - 13.40 %    Eosinophils 0.70 0.00 - 6.90 %    Basophils 0.10 0.00 - 1.80 %    Immature Granulocytes 0.40 0.00 - 0.90 %    Nucleated RBC 0.00 /100 WBC    Neutrophils (Absolute) 4.95 2.00 - 7.15 K/uL    Lymphs (Absolute) 1.38 1.00 - 4.80 K/uL    Monos (Absolute) 0.55 0.00 - 0.85 K/uL    Eos (Absolute) 0.05 0.00 - 0.51 K/uL    Baso (Absolute) 0.01 0.00 -  0.12 K/uL    Immature Granulocytes (abs) 0.03 0.00 - 0.11 K/uL    NRBC (Absolute) 0.00 K/uL   COMP METABOLIC PANEL   Result Value Ref Range    Sodium 140 135 - 145 mmol/L    Potassium 3.7 3.6 - 5.5 mmol/L    Chloride 108 96 - 112 mmol/L    Co2 17 (L) 20 - 33 mmol/L    Anion Gap 15.0 7.0 - 16.0    Glucose 93 65 - 99 mg/dL    Bun 11 8 - 22 mg/dL    Creatinine 0.67 0.50 - 1.40 mg/dL    Calcium 9.1 8.5 - 10.5 mg/dL    AST(SGOT) 11 (L) 12 - 45 U/L    ALT(SGPT) 34 2 - 50 U/L    Alkaline Phosphatase 55 30 - 99 U/L    Total Bilirubin 0.3 0.1 - 1.5 mg/dL    Albumin 4.2 3.2 - 4.9 g/dL    Total Protein 6.3 6.0 - 8.2 g/dL    Globulin 2.1 1.9 - 3.5 g/dL    A-G Ratio 2.0 g/dL   TROPONIN   Result Value Ref Range    Troponin T <6 6 - 19 ng/L   URINALYSIS CULTURE, IF INDICATED   Result Value Ref Range    Color Yellow     Character Cloudy (A)     Specific Gravity 1.022 <1.035    Ph 7.0 5.0 - 8.0    Glucose Negative Negative mg/dL    Ketones Negative Negative mg/dL    Protein Negative Negative mg/dL    Bilirubin Negative Negative    Urobilinogen, Urine 1.0 Negative    Nitrite Negative Negative    Leukocyte Esterase Negative Negative    Occult Blood Negative Negative    Micro Urine Req Microscopic    ESTIMATED GFR   Result Value Ref Range    GFR If African American >60 >60 mL/min/1.73 m 2    GFR If Non African American >60 >60 mL/min/1.73 m 2   Sed Rate   Result Value Ref Range    Sed Rate Westergren 5 0 - 20 mm/hour   CRP QUANTITIVE (NON-CARDIAC)   Result Value Ref Range    Stat C-Reactive Protein 0.27 0.00 - 0.75 mg/dL   URINE MICROSCOPIC (W/UA)   Result Value Ref Range    WBC 0-2 /hpf    RBC 0-2 /hpf    Bacteria Negative None /hpf    Epithelial Cells Few /hpf    Hyaline Cast 3-5 (A) /lpf   MAGNESIUM   Result Value Ref Range    Magnesium 2.1 1.5 - 2.5 mg/dL     *Note: Due to a large number of results and/or encounters for the requested time period, some results have not been displayed. A complete set of results can be found in  Results Review.      CT-HEAD W/O   Final Result      No evidence of acute intracranial process.            COURSE & MEDICAL DECISION MAKING  Pertinent Labs & Imaging studies reviewed. (See chart for details)  This is a 29-year-old female who apparently requires a lot of help at home been under significant stresses COVID-19, someone in family who is dying as per her.  Comes in today with left-sided eye pain blurred vision.  This point a painful optic neuritis is unusual.  She had been diagnosed with chronic relapsing bilateral optic neuritis and received IV steroids as per the notes.  In addition she was diagnosed with conversion disorder and I think the patient most likely is getting symptomatic as well.  Please note I review her chart there is some history of transverse myelitis as well so this is all difficult to ascertain.    My suspicion of increased intracranial pressure is low as the patient only complains of left eye pain and not bilateral.    Spine patient basic lab work done we will bring him in for further evaluation.  To consider steroid therapy.  The patient at this point is in guarded condition    At this point, the patient be admitted for observation and further treatment as needed this is a 29-year-old female with bilateral leg weakness, left eye pain, subjective swelling and blurred vision.  This point admitted for further observation    FINAL IMPRESSION  1.  Left eye pain  2.  Bilateral leg weakness  3.  Blurred vision  4.  Bilateral axilla tinea infection  5.  Suspected tinea infection lower back        Electronically signed by: John Mann M.D., 4/20/2020 4:33 PM

## 2020-04-20 NOTE — ED NOTES
ERP bedside for US-guided PIV placement.  Pt is a very difficult IV stick and normally requires multiple attempts by US.
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
## 2020-04-21 ENCOUNTER — APPOINTMENT (OUTPATIENT)
Dept: RADIOLOGY | Facility: MEDICAL CENTER | Age: 30
DRG: 123 | End: 2020-04-21
Attending: PSYCHIATRY & NEUROLOGY
Payer: MEDICARE

## 2020-04-21 LAB
ALBUMIN SERPL BCP-MCNC: 3.9 G/DL (ref 3.2–4.9)
ALBUMIN/GLOB SERPL: 2 G/DL
ALP SERPL-CCNC: 70 U/L (ref 30–99)
ALT SERPL-CCNC: 103 U/L (ref 2–50)
ANION GAP SERPL CALC-SCNC: 14 MMOL/L (ref 7–16)
AST SERPL-CCNC: 59 U/L (ref 12–45)
BASOPHILS # BLD AUTO: 0 % (ref 0–1.8)
BASOPHILS # BLD: 0 K/UL (ref 0–0.12)
BILIRUB SERPL-MCNC: 0.4 MG/DL (ref 0.1–1.5)
BUN SERPL-MCNC: 11 MG/DL (ref 8–22)
CALCIUM SERPL-MCNC: 8.8 MG/DL (ref 8.5–10.5)
CHLORIDE SERPL-SCNC: 108 MMOL/L (ref 96–112)
CO2 SERPL-SCNC: 14 MMOL/L (ref 20–33)
CREAT SERPL-MCNC: 0.63 MG/DL (ref 0.5–1.4)
EOSINOPHIL # BLD AUTO: 0.01 K/UL (ref 0–0.51)
EOSINOPHIL NFR BLD: 0.2 % (ref 0–6.9)
ERYTHROCYTE [DISTWIDTH] IN BLOOD BY AUTOMATED COUNT: 49 FL (ref 35.9–50)
GLOBULIN SER CALC-MCNC: 2 G/DL (ref 1.9–3.5)
GLUCOSE SERPL-MCNC: 153 MG/DL (ref 65–99)
HCT VFR BLD AUTO: 39.7 % (ref 37–47)
HGB BLD-MCNC: 12.9 G/DL (ref 12–16)
IMM GRANULOCYTES # BLD AUTO: 0.03 K/UL (ref 0–0.11)
IMM GRANULOCYTES NFR BLD AUTO: 0.6 % (ref 0–0.9)
LYMPHOCYTES # BLD AUTO: 0.36 K/UL (ref 1–4.8)
LYMPHOCYTES NFR BLD: 7.7 % (ref 22–41)
MCH RBC QN AUTO: 30.3 PG (ref 27–33)
MCHC RBC AUTO-ENTMCNC: 32.5 G/DL (ref 33.6–35)
MCV RBC AUTO: 93.2 FL (ref 81.4–97.8)
MONOCYTES # BLD AUTO: 0.06 K/UL (ref 0–0.85)
MONOCYTES NFR BLD AUTO: 1.3 % (ref 0–13.4)
NEUTROPHILS # BLD AUTO: 4.22 K/UL (ref 2–7.15)
NEUTROPHILS NFR BLD: 90.2 % (ref 44–72)
NRBC # BLD AUTO: 0 K/UL
NRBC BLD-RTO: 0 /100 WBC
PLATELET # BLD AUTO: 149 K/UL (ref 164–446)
PMV BLD AUTO: 11.7 FL (ref 9–12.9)
POTASSIUM SERPL-SCNC: 3.8 MMOL/L (ref 3.6–5.5)
PROT SERPL-MCNC: 5.9 G/DL (ref 6–8.2)
RBC # BLD AUTO: 4.26 M/UL (ref 4.2–5.4)
SODIUM SERPL-SCNC: 136 MMOL/L (ref 135–145)
TSH SERPL DL<=0.005 MIU/L-ACNC: 0.61 UIU/ML (ref 0.38–5.33)
WBC # BLD AUTO: 4.7 K/UL (ref 4.8–10.8)

## 2020-04-21 PROCEDURE — 700102 HCHG RX REV CODE 250 W/ 637 OVERRIDE(OP): Performed by: INTERNAL MEDICINE

## 2020-04-21 PROCEDURE — 70481 CT ORBIT/EAR/FOSSA W/DYE: CPT

## 2020-04-21 PROCEDURE — 85025 COMPLETE CBC W/AUTO DIFF WBC: CPT

## 2020-04-21 PROCEDURE — 700117 HCHG RX CONTRAST REV CODE 255: Performed by: PSYCHIATRY & NEUROLOGY

## 2020-04-21 PROCEDURE — 700102 HCHG RX REV CODE 250 W/ 637 OVERRIDE(OP): Performed by: STUDENT IN AN ORGANIZED HEALTH CARE EDUCATION/TRAINING PROGRAM

## 2020-04-21 PROCEDURE — A9270 NON-COVERED ITEM OR SERVICE: HCPCS | Performed by: STUDENT IN AN ORGANIZED HEALTH CARE EDUCATION/TRAINING PROGRAM

## 2020-04-21 PROCEDURE — 700111 HCHG RX REV CODE 636 W/ 250 OVERRIDE (IP): Performed by: STUDENT IN AN ORGANIZED HEALTH CARE EDUCATION/TRAINING PROGRAM

## 2020-04-21 PROCEDURE — 36415 COLL VENOUS BLD VENIPUNCTURE: CPT

## 2020-04-21 PROCEDURE — A9270 NON-COVERED ITEM OR SERVICE: HCPCS | Performed by: INTERNAL MEDICINE

## 2020-04-21 PROCEDURE — 700105 HCHG RX REV CODE 258: Performed by: STUDENT IN AN ORGANIZED HEALTH CARE EDUCATION/TRAINING PROGRAM

## 2020-04-21 PROCEDURE — 99223 1ST HOSP IP/OBS HIGH 75: CPT | Performed by: PSYCHIATRY & NEUROLOGY

## 2020-04-21 PROCEDURE — 86255 FLUORESCENT ANTIBODY SCREEN: CPT

## 2020-04-21 PROCEDURE — 84443 ASSAY THYROID STIM HORMONE: CPT

## 2020-04-21 PROCEDURE — 770006 HCHG ROOM/CARE - MED/SURG/GYN SEMI*

## 2020-04-21 PROCEDURE — 83516 IMMUNOASSAY NONANTIBODY: CPT

## 2020-04-21 PROCEDURE — 80053 COMPREHEN METABOLIC PANEL: CPT

## 2020-04-21 PROCEDURE — 97162 PT EVAL MOD COMPLEX 30 MIN: CPT

## 2020-04-21 RX ORDER — ACETAMINOPHEN 500 MG
1000 TABLET ORAL 3 TIMES DAILY
Status: DISCONTINUED | OUTPATIENT
Start: 2020-04-21 | End: 2020-04-25 | Stop reason: HOSPADM

## 2020-04-21 RX ORDER — MICONAZOLE NITRATE 20 MG/G
CREAM TOPICAL PRN
Status: DISCONTINUED | OUTPATIENT
Start: 2020-04-21 | End: 2020-04-25 | Stop reason: HOSPADM

## 2020-04-21 RX ORDER — MICONAZOLE NITRATE 20 MG/G
CREAM TOPICAL 2 TIMES DAILY
Status: DISCONTINUED | OUTPATIENT
Start: 2020-04-21 | End: 2020-04-25 | Stop reason: HOSPADM

## 2020-04-21 RX ADMIN — ACETAMINOPHEN 1000 MG: 500 TABLET ORAL at 12:50

## 2020-04-21 RX ADMIN — ENOXAPARIN SODIUM 40 MG: 100 INJECTION SUBCUTANEOUS at 06:09

## 2020-04-21 RX ADMIN — MICONAZOLE NITRATE: 20 CREAM TOPICAL at 21:05

## 2020-04-21 RX ADMIN — BUDESONIDE AND FORMOTEROL FUMARATE DIHYDRATE 2 PUFF: 160; 4.5 AEROSOL RESPIRATORY (INHALATION) at 17:47

## 2020-04-21 RX ADMIN — PREGABALIN 300 MG: 150 CAPSULE ORAL at 17:48

## 2020-04-21 RX ADMIN — TRAZODONE HYDROCHLORIDE 50 MG: 100 TABLET ORAL at 17:48

## 2020-04-21 RX ADMIN — NYSTATIN: 100000 POWDER TOPICAL at 21:04

## 2020-04-21 RX ADMIN — FLUOXETINE HYDROCHLORIDE 40 MG: 20 CAPSULE ORAL at 06:09

## 2020-04-21 RX ADMIN — MYCOPHENOLATE MOFETIL 1000 MG: 250 CAPSULE ORAL at 21:04

## 2020-04-21 RX ADMIN — SENNOSIDES AND DOCUSATE SODIUM 2 TABLET: 8.6; 5 TABLET ORAL at 17:48

## 2020-04-21 RX ADMIN — ONDANSETRON HYDROCHLORIDE 4 MG: 2 INJECTION INTRAMUSCULAR; INTRAVENOUS at 17:49

## 2020-04-21 RX ADMIN — BUSPIRONE HYDROCHLORIDE 10 MG: 10 TABLET ORAL at 17:48

## 2020-04-21 RX ADMIN — ASPIRIN 81 MG: 81 TABLET, COATED ORAL at 06:09

## 2020-04-21 RX ADMIN — ACETAZOLAMIDE 1000 MG: 250 TABLET ORAL at 18:34

## 2020-04-21 RX ADMIN — ACETAZOLAMIDE 1000 MG: 250 TABLET ORAL at 07:40

## 2020-04-21 RX ADMIN — OXCARBAZEPINE 150 MG: 150 TABLET, FILM COATED ORAL at 07:40

## 2020-04-21 RX ADMIN — SENNOSIDES AND DOCUSATE SODIUM 2 TABLET: 8.6; 5 TABLET ORAL at 06:09

## 2020-04-21 RX ADMIN — MICONAZOLE NITRATE: 20 CREAM TOPICAL at 12:51

## 2020-04-21 RX ADMIN — SODIUM CHLORIDE 250 MG: 9 INJECTION, SOLUTION INTRAVENOUS at 12:50

## 2020-04-21 RX ADMIN — IVABRADINE 7.5 MG: 5 TABLET, FILM COATED ORAL at 07:40

## 2020-04-21 RX ADMIN — ACETAMINOPHEN 1000 MG: 500 TABLET ORAL at 09:03

## 2020-04-21 RX ADMIN — OXCARBAZEPINE 150 MG: 150 TABLET, FILM COATED ORAL at 18:34

## 2020-04-21 RX ADMIN — LEVOTHYROXINE SODIUM 100 MCG: 100 TABLET ORAL at 06:09

## 2020-04-21 RX ADMIN — MODAFINIL 200 MG: 100 TABLET ORAL at 06:09

## 2020-04-21 RX ADMIN — PREGABALIN 300 MG: 150 CAPSULE ORAL at 09:03

## 2020-04-21 RX ADMIN — GLYCOPYRROLATE 1 CAPSULE: 15.6 CAPSULE RESPIRATORY (INHALATION) at 09:06

## 2020-04-21 RX ADMIN — BUDESONIDE AND FORMOTEROL FUMARATE DIHYDRATE 2 PUFF: 160; 4.5 AEROSOL RESPIRATORY (INHALATION) at 06:00

## 2020-04-21 RX ADMIN — ZIPRASIDONE HYDROCHLORIDE 40 MG: 40 CAPSULE ORAL at 07:40

## 2020-04-21 RX ADMIN — SODIUM CHLORIDE 250 MG: 9 INJECTION, SOLUTION INTRAVENOUS at 06:09

## 2020-04-21 RX ADMIN — MELATONIN 1000 UNITS: at 06:09

## 2020-04-21 RX ADMIN — NYSTATIN: 100000 POWDER TOPICAL at 06:09

## 2020-04-21 RX ADMIN — SODIUM CHLORIDE 250 MG: 9 INJECTION, SOLUTION INTRAVENOUS at 18:37

## 2020-04-21 RX ADMIN — BUSPIRONE HYDROCHLORIDE 10 MG: 10 TABLET ORAL at 06:09

## 2020-04-21 RX ADMIN — MYCOPHENOLATE MOFETIL 1000 MG: 250 CAPSULE ORAL at 10:33

## 2020-04-21 RX ADMIN — IOHEXOL 75 ML: 350 INJECTION, SOLUTION INTRAVENOUS at 16:41

## 2020-04-21 RX ADMIN — IVABRADINE 7.5 MG: 5 TABLET, FILM COATED ORAL at 17:49

## 2020-04-21 RX ADMIN — ACETAMINOPHEN 1000 MG: 500 TABLET ORAL at 17:47

## 2020-04-21 RX ADMIN — GLYCOPYRROLATE 1 CAPSULE: 15.6 CAPSULE RESPIRATORY (INHALATION) at 21:05

## 2020-04-21 ASSESSMENT — ENCOUNTER SYMPTOMS
DOUBLE VISION: 1
COUGH: 0
PHOTOPHOBIA: 1
CHILLS: 0
WEAKNESS: 1
PALPITATIONS: 0
SPEECH CHANGE: 0
BLURRED VISION: 1
SPUTUM PRODUCTION: 0
HALLUCINATIONS: 0
NAUSEA: 0
MYALGIAS: 0
EYE PAIN: 1
SENSORY CHANGE: 0
SHORTNESS OF BREATH: 0
HEARTBURN: 0
FOCAL WEAKNESS: 0
VOMITING: 0
DIZZINESS: 0
ORTHOPNEA: 0
HEMOPTYSIS: 0
ABDOMINAL PAIN: 0
BRUISES/BLEEDS EASILY: 0
DEPRESSION: 0
FEVER: 0
EYE REDNESS: 0
HEADACHES: 0
EYE DISCHARGE: 0
SORE THROAT: 0

## 2020-04-21 ASSESSMENT — COGNITIVE AND FUNCTIONAL STATUS - GENERAL
SUGGESTED CMS G CODE MODIFIER MOBILITY: CL
MOVING TO AND FROM BED TO CHAIR: A LITTLE
MOBILITY SCORE: 11
DRESSING REGULAR LOWER BODY CLOTHING: TOTAL
MOVING FROM LYING ON BACK TO SITTING ON SIDE OF FLAT BED: UNABLE
DRESSING REGULAR UPPER BODY CLOTHING: A LOT
TURNING FROM BACK TO SIDE WHILE IN FLAT BAD: A LITTLE
DAILY ACTIVITIY SCORE: 12
HELP NEEDED FOR BATHING: TOTAL
STANDING UP FROM CHAIR USING ARMS: A LOT
WALKING IN HOSPITAL ROOM: TOTAL
CLIMB 3 TO 5 STEPS WITH RAILING: TOTAL
MOBILITY SCORE: 14
TOILETING: TOTAL
PERSONAL GROOMING: A LITTLE
SUGGESTED CMS G CODE MODIFIER DAILY ACTIVITY: CL
SUGGESTED CMS G CODE MODIFIER MOBILITY: CL
WALKING IN HOSPITAL ROOM: A LITTLE
MOVING FROM LYING ON BACK TO SITTING ON SIDE OF FLAT BED: UNABLE
STANDING UP FROM CHAIR USING ARMS: A LITTLE
MOVING TO AND FROM BED TO CHAIR: A LITTLE
TURNING FROM BACK TO SIDE WHILE IN FLAT BAD: A LITTLE
CLIMB 3 TO 5 STEPS WITH RAILING: TOTAL

## 2020-04-21 ASSESSMENT — PAIN SCALES - WONG BAKER: WONGBAKER_NUMERICALRESPONSE: HURTS JUST A LITTLE BIT

## 2020-04-21 ASSESSMENT — LIFESTYLE VARIABLES
ON A TYPICAL DAY WHEN YOU DRINK ALCOHOL HOW MANY DRINKS DO YOU HAVE: 0
HAVE YOU EVER FELT YOU SHOULD CUT DOWN ON YOUR DRINKING: NO
AVERAGE NUMBER OF DAYS PER WEEK YOU HAVE A DRINK CONTAINING ALCOHOL: 0
HAVE PEOPLE ANNOYED YOU BY CRITICIZING YOUR DRINKING: NO
TOTAL SCORE: 0
ALCOHOL_USE: NO
TOTAL SCORE: 0
TOTAL SCORE: 0
CONSUMPTION TOTAL: NEGATIVE
HOW MANY TIMES IN THE PAST YEAR HAVE YOU HAD 5 OR MORE DRINKS IN A DAY: 0
EVER FELT BAD OR GUILTY ABOUT YOUR DRINKING: NO
EVER HAD A DRINK FIRST THING IN THE MORNING TO STEADY YOUR NERVES TO GET RID OF A HANGOVER: NO

## 2020-04-21 ASSESSMENT — FIBROSIS 4 INDEX: FIB4 SCORE: 0.32

## 2020-04-21 NOTE — DISCHARGE PLANNING
Anticipated Discharge Disposition: Unknown at this time.     Action: LSW received a phone call from Evelyn with [facility] who stated that they have worked with pt in the past and they are not able to take pt back. Pt has been declined by many SNFs due to her behaviors and psych history. Pt was admitted to [facility] at the end of March 2020. According to Evelyn with [facility], pt checked herself out of [facility] and admitted herself to [facility]. Pt lives with her elderly grandmother and is a frequent admit to the ER.     LSW left a message for Vivienne, pt's grandmother ([phone]), requesting a call back.     Barriers to Discharge: Placement, Pt has been declined by many SNFs and HH agencies in the past.     Plan: Awaiting call back from pt's grandmother. Assist with discharge planning.     Care Transition Team Assessment    Information Source  Orientation : Oriented x 4  Information Given By: Other (Comments)(Patient's chart; [facility] )  Who is responsible for making decisions for patient? : Patient    Readmission Evaluation  Is this a readmission?: Yes - unplanned readmission    Elopement Risk  Legal Hold: No  Ambulatory or Self Mobile in Wheelchair: No-Not an Elopement Risk    Interdisciplinary Discharge Planning  Patient or legal guardian wants to designate a caregiver (see row info): No    Discharge Preparedness  What is your plan after discharge?: Uncertain - pending medical team collaboration  What are your discharge supports?: Grandparent  Prior Functional Level: Needs Assist with Activities of Daily Living, Needs Assist with Medication Management    Functional Assesment  Prior Functional Level: Needs Assist with Activities of Daily Living, Needs Assist with Medication Management    Finances  Financial Barriers to Discharge: No  Prescription Coverage: Yes    Vision / Hearing Impairment  Vision Impairment : Yes  Right Eye Vision: Impaired, Wears Glasses  Left Eye Vision: Impaired, Wears Glasses  Hearing Impairment :  No    Advance Directive  Advance Directive?: DPOA for Health Care  Durable Power of  Name and Contact : Vivienne Valdivianlgayle; [phone]    Domestic Abuse  Have you ever been the victim of abuse or violence?: No    Psychological Assessment  History of Substance Abuse: None  History of Psychiatric Problems: Yes  Non-compliant with Treatment: Yes    Discharge Risks or Barriers  Discharge risks or barriers?: Mental health, Complex medical needs, Non-adherence to medication or treatment, Post-acute placement / services  Patient risk factors: Readmission, Vulnerable adult, Noncompliance, Multiple ED visits, Mental health, Complex medical needs    Anticipated Discharge Information  Anticipated discharge disposition: Discharge needs currently unknown

## 2020-04-21 NOTE — ASSESSMENT & PLAN NOTE
- The patient reported that she is bed bound since December 2019  - Likely just deconditioning   - Multifactorial in nature, multiple co morbidities including morbid obesity and psychiatric problems   - 4/22 PT/OT No needs from them. Patient able to transfer, stand up, but not able to walk.  - No changes

## 2020-04-21 NOTE — DIETARY
NUTRITION SERVICES: BMI - Pt with BMI >40 (=Body mass index is 42.45 kg/m².), morbid obesity. Weight loss counseling not appropriate in acute care setting. RECOMMEND - Referral to outpatient nutrition services for weight management after D/C.

## 2020-04-21 NOTE — ED NOTES
Pt medicated per MAR.  All monitoring has been in place.  Blood and BC x 1 set drawn by MD w/ PIV placement.   coming for 2nd set BC.  Pt aware of need for urine sample.  Unable to void at this time.

## 2020-04-21 NOTE — ASSESSMENT & PLAN NOTE
- Chronic relapsing inflammatory optic neuropathy (CRION) with multiple relapses over the past several months  - The patient presented today with painful, blurry left eye, the pain is worse with eye movements.  - Recurrent admissions to the hospital for similar complaints, She is on daily dose of prednisone 5 mg.  - MRI brain couldn't be done because of her spinal stimulator.  - CT head orderd and didn't show acute intracranial abnormalities.  - Patient had an appointment with Dr Yousif, but unable to attend due to her acute pain.  - CT [place] 4/21 unremarkable  - Neurology ordered Anti MOG, Aquaporin 4 ab  - Patient will see Dr Yousif - 4/22  - 4/22 patient states improvement, pain is now 7/10 (was 15/10)  - Patient out of bed in wheelchair, able to transfer on her own.  - 4/23 Patient still with mild/moderate pain.  - 4/24 PRN meds working for pain. Pain better controlled today.  - 5/25 still some improvement in pain.  Ok to d/c.                      PLAN:  - Completed 4 days of Methylprednisolone 250 mg Q6 hrs x 4 days per Dr Yousif  - Oral prednisone 20 mg - started 4/25/2020 today  - DC home on po prednisone 20 mg  - Neurology on board  - f/u outpatient with Dr. Yousif.

## 2020-04-21 NOTE — CONSULTS
"Hospital Neurology Consult:    Referring Physician: ANDERSON Cagle    Reason for consultation: Suspected optic neuritis.    HPI: Kristin Balderrama is a 29 y.o. female with history of abdominal pain, hx of CRION on Cellcept and Prednisone, hx of chronic pain w/ spinal stimulator in place, hx of functional neurologic disorder presenting to the hospital for eye pain and consulted for suspected optic neuritis. Patient was in her usual state of health when she suddenly developed L orbital pain worse w/ eye movement, as well as loss of visual acuity. She states she had \"swelling under the L eye like if someone had punched her\". The pain was 13/10 and accompanied with a headache. She also complained of weakness involving her whole body as if \"the batteries in my body gave out\", but this has improved. She presented to the E.D. and was admitted with a presumptive diagnosis of recurrent episode of ON and was started on steroids. Currently she states her L eye is still painful however her strength is overall returning.     ROS:     As above. All other systems reviewed and are negative.    Past Medical History:    has a past medical history of Abdominal pain, Anginal syndrome, Apnea, sleep, Arrhythmia, Arthritis, ASTHMA, Atrial fibrillation (HCC), Back pain, Borderline personality disorder ([facility]), Breath shortness, Bronchitis, Cardiac arrhythmia, Chickenpox, Chronic UTI (9/18/2010), Cough, Daytime sleepiness, Depression, Diabetes (HCC), Diarrhea, Disorder of thyroid, Fall, Fatigue, Frequent headaches, Gasping for breath, Gynecological disorder, Headache(784.0), Heart burn, History of falling, Hypertension, Indigestion, Migraine, 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HCC), 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HCC), Nausea, Obesity, Pain (08-15-12), Painful joint, PCOS (polycystic ovarian syndrome), Pneumonia (2012), Psychosis (HCC), Renal disorder, Ringing in ears, Scoliosis, Shortness of breath, Sinus tachycardia (10/31/2013), Sleep " "apnea, Snoring, Tonsillitis, Tuberculosis, Urinary bladder disorder, Urinary incontinence, Weakness, and Wears glasses.    FHx:  family history includes Genitourinary () Problems in her sister; Heart Disease in her maternal grandmother and mother; Hypertension in her maternal grandmother, maternal uncle, and mother; No Known Problems in her sister; Other in her mother and sister; Sleep Apnea in her mother.    SHx:   reports that she has never smoked. She has never used smokeless tobacco. She reports previous drug use. Frequency: 7.00 times per week. Drugs: Marijuana and Oral. She reports that she does not drink alcohol.    Allergies:  Allergies   Allergen Reactions   • Cefdinir Shortness of Breath and Itching     Tolerated 1/18/17  Tolerates ceftriaxone  Tolerated augmentin 8/2019    • Depakote [Divalproex Sodium] Unspecified     Muscle spasms/muscle pain and weakness     • Doxycycline Anaphylaxis and Vomiting     RXN=unknown   • Amitriptyline Unspecified     Headaches     • Aripiprazole [Abilify] Unspecified     Headaches/muscle twitching     • Clindamycin Nausea     Even with food     • Flagyl [Metronidazole Hcl] Unspecified     \"eye problems\"     • Flomax [Tamsulosin Hydrochloride] Swelling   • Levaquin Unspecified     Severe muscle cramps in legs  RXN=unknown   • Metformin Unspecified     Increased lactic acid      • Tape Rash     Tears skin off  coban with Tegaderm tape ok intermittently  RXN=ongoing   • Vancomycin Itching     Pt becomes flushed in face and chest.   RXN=7/10/16   • Wound Dressing Adhesive Hives     By pt report   • Ciprofloxacin    • Depakote  [Divalproex Sodium]    • Hydromorphone Hcl    • Kdc:Metronidazole+Tartrazine    • Keflex Rash     Pt states she gets a rash with this medication  Tolerates ceftriaxone   • Levofloxacin Unspecified     Leg muscle cramps   • Penicillins        Medications:    Current Facility-Administered Medications:   •  acetaminophen (TYLENOL) tablet 1,000 mg, 1,000 " mg, Oral, TID, Earle Sanchez M.D., 1,000 mg at 04/21/20 1250  •  miconazole 2%-zinc oxide (INZO) topical cream, , Topical, BID, Vianey Duggan M.D.  •  miconazole 2%-zinc oxide (INZO) topical cream, , Topical, PRN, Vianey Duggan M.D.  •  senna-docusate (PERICOLACE or SENOKOT S) 8.6-50 MG per tablet 2 Tab, 2 Tab, Oral, BID, 2 Tab at 04/21/20 0609 **AND** polyethylene glycol/lytes (MIRALAX) PACKET 1 Packet, 1 Packet, Oral, QDAY PRN **AND** magnesium hydroxide (MILK OF MAGNESIA) suspension 30 mL, 30 mL, Oral, QDAY PRN **AND** bisacodyl (DULCOLAX) suppository 10 mg, 10 mg, Rectal, QDAY PRN, Trenton Levine M.D.  •  enoxaparin (LOVENOX) inj 40 mg, 40 mg, Subcutaneous, DAILY, Trenton Levine M.D., 40 mg at 04/21/20 0609  •  nystatin (MYCOSTATIN) powder, , Topical, BID, Trenton Levine M.D.  •  ondansetron (ZOFRAN) syringe/vial injection 4 mg, 4 mg, Intravenous, Q4HRS PRN, Trenton Levine M.D., 4 mg at 04/20/20 2257  •  acetaZOLAMIDE (DIAMOX) tablet 1,000 mg, 1,000 mg, Oral, BID, Trenton Levine M.D., 1,000 mg at 04/21/20 0740  •  albuterol inhaler 2 Puff, 2 Puff, Inhalation, Q6HRS PRN, Trenton Levine M.D.  •  aspirin EC (ECOTRIN) tablet 81 mg, 81 mg, Oral, DAILY, Trenton Levine M.D., 81 mg at 04/21/20 0609  •  budesonide-formoterol (SYMBICORT) 160-4.5 MCG/ACT inhaler 2 Puff, 2 Puff, Inhalation, BID, Trenton Levine M.D., 2 Puff at 04/21/20 0600  •  busPIRone (BUSPAR) tablet 10 mg, 10 mg, Oral, BID, Trenton Levine M.D., 10 mg at 04/21/20 0609  •  FLUoxetine (PROZAC) capsule 40 mg, 40 mg, Oral, DAILY, Trenton Levine M.D., 40 mg at 04/21/20 0609  •  ipratropium-albuterol (DUONEB) nebulizer solution, 3 mL, Nebulization, Q6HRS PRN, Trenton Levine M.D.  •  furosemide (LASIX) tablet 80 mg, 80 mg, Oral, QDAY PRN, Trenton Levine M.D.  •  modafinil (PROVIGIL) tablet 200 mg, 200 mg, Oral, QAM, Trenton Levine M.D., 200 mg at 04/21/20 0609  •  levothyroxine (SYNTHROID) tablet 100 mcg, 100 mcg, Oral, AM ES, Trenton Levine M.D., 100 mcg at 04/21/20  0609  •  OXcarbazepine (TRILEPTAL) tablet 150 mg, 150 mg, Oral, BID, Trenton Levine M.D., 150 mg at 04/21/20 0740  •  pregabalin (LYRICA) capsule 300 mg, 300 mg, Oral, BID, Trenton Levine M.D., 300 mg at 04/21/20 0903  •  traZODone (DESYREL) tablet 50 mg, 50 mg, Oral, Q EVENING, Trenton Levine M.D., 50 mg at 04/20/20 2226  •  vitamin D (cholecalciferol) tablet 1,000 Units, 1,000 Units, Oral, DAILY, Trenton Levine M.D., 1,000 Units at 04/21/20 0609  •  ziprasidone (GEODON) capsule 40 mg, 40 mg, Oral, DAILY, Trenton Levine M.D., 40 mg at 04/21/20 0740  •  glycopyrrolate (Seebri) 15.6 mcg inhalation capsule 1 Cap, 1 Cap, Inhalation, BID (RT), Trenton Levine M.D., 1 Cap at 04/21/20 0906  •  ivabradine (CORLANOR) tablet 7.5 mg, 7.5 mg, Oral, BID WITH MEALS, Trenton Levine M.D., 7.5 mg at 04/21/20 0740  •  methylPREDNISolone sod succ (SOLU-MEDROL) 250 mg in  mL IVPB, 250 mg, Intravenous, Q6HRS, Trenton Levine M.D., Last Rate: 100 mL/hr at 04/21/20 1250, 250 mg at 04/21/20 1250  •  mycophenolate (CELLCEPT) capsule 1,000 mg, 1,000 mg, Oral, BID, Trenton Levine M.D., 1,000 mg at 04/21/20 1033    Vitals:   Vitals:    04/21/20 0129 04/21/20 0400 04/21/20 0720 04/21/20 1215   BP:  124/68 116/57 128/72   Pulse:  78 76 98   Resp:  16 16 18   Temp:  36.4 °C (97.5 °F) 36.4 °C (97.6 °F) 36.3 °C (97.3 °F)   TempSrc:  Temporal Temporal Temporal   SpO2:  95% 94% 94%   Weight: 115.7 kg (255 lb 1.2 oz)      Height:           Labs:  Lab Results   Component Value Date/Time    PROTHROMBTM 13.1 02/27/2020 01:10 AM    INR 0.97 02/27/2020 01:10 AM      Lab Results   Component Value Date/Time    WBC 4.7 (L) 04/21/2020 04:33 AM    WBC 6.1 07/20/2010 11:00 AM    RBC 4.26 04/21/2020 04:33 AM    RBC 4.38 07/20/2010 11:00 AM    HEMOGLOBIN 12.9 04/21/2020 04:33 AM    HEMATOCRIT 39.7 04/21/2020 04:33 AM    MCV 93.2 04/21/2020 04:33 AM    MCV 93 07/20/2010 11:00 AM    MCH 30.3 04/21/2020 04:33 AM    MCH 30.1 07/20/2010 11:00 AM    MCHC 32.5 (L) 04/21/2020 04:33 AM     MPV 11.7 04/21/2020 04:33 AM    NEUTSPOLYS 90.20 (H) 04/21/2020 04:33 AM    LYMPHOCYTES 7.70 (L) 04/21/2020 04:33 AM    MONOCYTES 1.30 04/21/2020 04:33 AM    EOSINOPHILS 0.20 04/21/2020 04:33 AM    BASOPHILS 0.00 04/21/2020 04:33 AM    ANISOCYTOSIS 1+ 07/08/2019 04:04 PM      Lab Results   Component Value Date/Time    SODIUM 136 04/21/2020 04:33 AM    POTASSIUM 3.8 04/21/2020 04:33 AM    CHLORIDE 108 04/21/2020 04:33 AM    CO2 14 (L) 04/21/2020 04:33 AM    GLUCOSE 153 (H) 04/21/2020 04:33 AM    BUN 11 04/21/2020 04:33 AM    CREATININE 0.63 04/21/2020 04:33 AM    CREATININE 0.75 (L) 07/20/2010 11:00 AM    BUNCREATRAT 19 07/20/2010 11:00 AM    GLOMRATE >59 07/20/2010 11:00 AM      Lab Results   Component Value Date/Time    CHOLSTRLTOT 150 11/08/2019 04:30 AM    LDL 70 11/08/2019 04:30 AM    HDL 50 11/08/2019 04:30 AM    TRIGLYCERIDE 149 11/08/2019 04:30 AM       Lab Results   Component Value Date/Time    ALKPHOSPHAT 70 04/21/2020 04:33 AM    ASTSGOT 59 (H) 04/21/2020 04:33 AM    ALTSGPT 103 (H) 04/21/2020 04:33 AM    TBILIRUBIN 0.4 04/21/2020 04:33 AM        Lab Results   Component Value Date/Time    FREET4 0.76 02/29/2020 04:34 AM    FREET4 0.97 07/16/2019 11:34 AM         Imaging/Testing:  CT Head W/O CST on 4/20/20 personally reviewed and w/o evidence of stroke/hemorrhage.    CTA Head/Neck from previous hospitalizations reviewed in chart.       Reviewed records from previous notes by other neurologists including myself.     Physical Exam:     General: 30 y/o female in bed in NAD  Cardio: Normal S1/S2. No peripheral edema.   Pulm: CTAX2. No respiratory distress.   Skin: Warm, dry, no rashes or lesions   Psychiatric: Appropriate affect. No active psychosis.  HEENT: Atraumatic head, normal sclera and conjunctiva, moist oral mucosa. No lid lag.  Abdomen: Soft, non tender. No masses or hepatosplenomegaly.    Neurologic:  Mental Status:  AAOx4. Able to follow commands/cross midline. Speech fluent/nondysarthric.  Language functions intact. No neglect/apraxia.  Cranial Nerves:  PERRL. EOMi. Face symmetric, palate/tongue midline. Presence of RAPD on the L eye.  Motor:  Normal muscle tone and bulk. Strength is 5/5 throughout but with give-way weakness in the RUE triceps and bilateral knee extension/plantar flexion.  Reflexes: Deferred  Coordination: Finger-nose w/o ataxia  Sensation: Deferred  Gait/Station: Deferred    Assessment/Plan:    Kristin Balderrama is a 29 y.o. female with history of abdominal pain, hx of CRION on Cellcept and Prednisone, hx of chronic pain w/ spinal stimulator in place, hx of functional neurologic disorder presenting to the hospital for eye pain and consulted for suspected optic neuritis. On physical exam she appears to have an RAPD on the left, this finding is indicative of ON however does not discriminate between acute vs chronic ON. Her symptoms of whole body weakness are suspicious for functional overlay, as is her give-way weakness on today's exam. I spoke to Dr. Yousif who knows her case well, and he is going to arrange to see her in the following days. I also obtained a CT orbits W/WO CST and Aquaporin 4 antibody + Anti MOG at his recommendations (these were previously negative). Unfortunately she is unable to have an MRI to confirm acute neuritis.     Plan:  -Continue Solu-Medrol 1g IV daily for 3 days total.  -To be evaluated by Neuro-Ophthalmology.  -Maintenance prednisone to increase to 20mg daily  -Continue Diamox.  -Repeat Aquaporin 4 antibody, and anti MOG.  -At this point no further treatment/workup to offer given inability to obtain imaging, and multiple LPs in the past which were non inflammatory. Will sign off at this juncture and defer management to neuro-ophthalmology.  -Will place referral for general neurology clinic.   -Plan discussed with consulting physician and patient's nurse.       Ronny Koroma M.D., Diplomat of the American Board of Psychiatry and  Neurology  Diplomat of ABP[employer] Epilepsy Subspecialty   Assistant Clinical Professor, [facility] Neurology Consultant

## 2020-04-21 NOTE — THERAPY
Physical Therapy Evaluation completed.   Bed Mobility:  Supine to Sit: Supervised  Transfers: Sit to Stand: (pt stated shes unable)  Gait: Level Of Assist: (Pt uses WC for mobility) with Wheelchair       Plan of Care: Patient with no further skilled PT needs in the acute care setting at this time  Discharge Recommendations: Equipment: No Equipment Needed. Post-acute therapy Discharge to home with outpatient or home health for additional skilled therapy services.    Assessment: Pt is a 29 year old female with complex PMH of chronic relapsing inflammatory optic neuropathy, hypothyroidism, DM2, TONYA, obesity, asthma, chronic pain, SVT s/p ablation, hemiplegic migraine,intracranial hypertension, and multiple psychiatric issues presented to the ED with L eye pain and swelling. CT head negative for acute abnormalities. Pt has had recurrent admissions and was discharged to SNF in February. Pt stated her family discharged her from SNF because she wasn't improving and only getting sicker. Pt lives with her grandmother who provides assistance when needed. Pts grandmother is also caring for pts grandfather who is unwell. Pt has been using a WC for mobility and slide board for transfers since December 2019. During initial PT eval, pt presented with b/l knee extension weakness (2/5) and with L ankle resting in PF and inversion. Pt able to complete full ROM for b/l ankles and hips. Pt performed supine>sitting and rolling with SPV from flat bed. Pt sat at EOB with fair balance. Pt told therapist she has not been able to ambulate since December. Pt provided with WC and slide board and completed EOB>WC transfer with SPV and safe sequencing. Pt stated her goal is to work with therapy so she can walk again. Therapist educated pt on the role of PTs in the acute care setting. Therapist explained that patient is currently at her prior level and is completing transfers and bed mobility safe and without assistance. Pt demonstrated her HEP  "for LE strengthening and encouraged to continue with exercises when sitting upright throughout the day. Pt left up in WC in room and RN aware. At this point, pt is currently functioning at her prior level and has no acute PT needs. Recommend discharge home with assistance from family with home health transitional care for continued physical therapy services.     See \"Rehab Therapy-Acute\" Patient Summary Report for complete documentation.     Rosanne Harris PT, DPT  Pager [phone]  "

## 2020-04-21 NOTE — CARE PLAN
Problem: Communication  Goal: The ability to communicate needs accurately and effectively will improve  Outcome: PROGRESSING AS EXPECTED  Note: Encouraged use of call light, assessed needs, encouraged pt to voice feelings.        Problem: Safety  Goal: Will remain free from falls  Outcome: PROGRESSING AS EXPECTED  Note: Patient at risk for falls due to stroke, bed alarm on, wheelchair and commode out of sight, nonskid socks on, call light within reach, personal belongings within reach, toileting offered.

## 2020-04-21 NOTE — ED NOTES
Pt re-medicated for pain per MAR.  Pt awake, alert, vss.  Updated on POC, awaiting bed assignment.

## 2020-04-21 NOTE — WOUND TEAM
[facility]  Inpatient Services  Initial Wound and Skin Care Evaluation    Admission Date: 4/20/2020     Last order of IP CONSULT TO WOUND CARE was found on 4/20/2020 from Hospital Encounter on 4/20/2020       HPI, PMH, SH: Reviewed    Unit where seen by Wound Team: [id number]     WOUND CONSULT RELATED TO: coccyx wound    Self Report / Pain Level: c/o pain with cleaning       OBJECTIVE:  Pt lying on pressure redistribution mattress, moved self, barrier paste in use    WOUND TYPE, LOCATION, CHARACTERISTICS (Pressure Injuries: location, stage, POA or date identified)  Wound 03/09/20 Other (comment) Sacrum Bilateral moisture associated skin damage MASD (Active)      4/21/2020 10:45 AM   Site Assessment Red    Periwound Assessment Satellite lesions;Pink    Margins Defined edges    Drainage Amount None    Treatments Cleansed    Wound Cleansing Soap and Water    Periwound Protectant Antifungal Therapy    Dressing Options Open to Air    Dressing Status Open to Air    NEXT Dressing Change/Treatment Date 04/21/20    Wound Length (cm) 10 cm 4/21/2020 10:45 AM   Wound Width (cm) 2.5 cm 4/21/2020 10:45 AM   Wound Depth (cm) 0.1 cm 4/21/2020 10:45 AM   Wound Surface Area (cm^2) 25 cm^2 4/21/2020 10:45 AM   Wound Volume (cm^3) 2.5 cm^3 4/21/2020 10:45 AM   Tunneling (cm) 0 cm 4/21/2020 10:45 AM   Undermining (cm) 0 cm 4/21/2020 10:45 AM   Shape linear    Wound Odor None    Pulses N/A    Exposed Structures None    Number of days: 43          Vascular:    JOSHUA:   No results found.      Lab Values:    Lab Results   Component Value Date/Time    WBC 4.7 (L) 04/21/2020 04:33 AM    WBC 6.1 07/20/2010 11:00 AM    RBC 4.26 04/21/2020 04:33 AM    RBC 4.38 07/20/2010 11:00 AM    HEMOGLOBIN 12.9 04/21/2020 04:33 AM    HEMATOCRIT 39.7 04/21/2020 04:33 AM    CREACTPROT 0.27 04/20/2020 05:00 PM    SEDRATEWES 5 04/20/2020 05:00 PM    HBA1C 6.0 (H) 08/29/2019 02:27 PM            Culture Results show:  No results found for this or any  previous visit (from the past 720 hour(s)).      INTERVENTIONS BY WOUND TEAM:  Pt turned to R side. Assessed buttocks/sacrococcygeal area. Clean skin with soapy warm warsh clothLeft wound MARK and antifungal barrier paste ordered. RN and PT in to se pt.     Interdisciplinary consultation: Patient, Bedside RN    EVALUATION:pt has MASD with fissure and satellite lesions inside gluteal cleft to sacrococcygeal area.     Goals: Steady decrease in wound area and depth weekly.    NURSING PLAN OF CARE ORDERS (X):    Dressing changes: See Dressing Care orders:   Skin care: See Skin Care orders: x  Rectal tube care: See Rectal Tube Care orders:   Other orders:    RSKIN:   CURRENTLY IN PLACE (X), APPLIED THIS VISIT (A), ORDERED (O):   Q shift Shay:  x  Q shift pressure point assessments:    Pressure redistribution mattress   x         Low Airloss          Bariatric SOCO         Bariatric foam           Heel float boots     Heel Silicone dressing        Float Heels off Bed with Pillows               Barrier wipes         Barrier Cream         Barrier paste    x      Sacral silicone dressing         Silicone O2 tubing         Anchorfast         Cannula fixation Device (Tender )          Gray Foam Ear protectors           Trach with Optifoam split foam                 Waffle cushion        Waffle Overlay         Rectal tube or BMS    Purwick/Condom Cath          Antifungal tx   o   Interdry          Reposition q 2 hours   Remind and assist pt     Up to chair        Ambulate      PT/OT        Dietician        Diabetes Education      PO  x   TF     TPN     NPO   # days   Other        WOUND TEAM PLAN OF CARE   Dressing changes by wound team:          Follow up 1-2 times weekly:               Follow up 3 times weekly:                NPWT change 3 times weekly:     Follow up as needed:  Nsg to notify if wound worsens     Other (explain):     Anticipated discharge plans:  no advanced wound care needs  LTACH:        SNF/Rehab:                   Home Care:           Outpatient Wound Center:            Self Care:

## 2020-04-21 NOTE — ASSESSMENT & PLAN NOTE
- Recurrent idiopathic intracranial hypertension unassociated with papilledema  - Was previously treated with lumbar puncture, still on diamox  - No headaches this admission  - CT head unremarkable 4/20/2020  - CT Orbits Unremarkable 4/22/2020  - Labs: Low Bicarb, likely secondary to Acetazolamide               Bicarb mild fluctuations  - 4/24 Diamox cut in half - to  500mg BID  - 4/24 Bicarb slightly better 16 from 13 yesterday  - 5/25 Bicarb improved to 18.

## 2020-04-21 NOTE — SENIOR ADMIT NOTE
"Senior Admit Note    Kristin Balderrama is a 29 y.o. female with a history of chronically relapsing inflammatory optic neuropathy on Cellcept/prednisone, possible transverse myelitis, schizoaffective disorder, functional neurological disorder, idiopathic intracranial hypertension on Diamox, Diabetes, asthma, TONYA, inappropriate sinus tachycardia treated with Corlanor with previous sinus node ablation and hypothyroidism who presented to the hospital with left eye pain that started at midnight last night.   Patient endorses having diplopia when looking with both eyes. She reports having sharp, shooting radiating pain to the \"left side of her brain.\" She also endorses having photosensitivity, but denies any erythema, fevers or drainage. Of note, she was recently diagnosed with bronchitis and had completed a few day course of prednisone 20mg a couple days ago after which she started taking her routine prednisone 5mg. Patient also reports having upper and lower extremity weakness. She has been bedbound since December and is unable to walk even with assistive devices.     In the ED, patient was hemodynamically stable. No acute lab abnormalities noted. She received 4mg morphine, 1L NS and IV Zofran.     Pertinent physical exam:   General: Laying in bed in no acute distress   Head: Non-tender to palpaion  Eyes: +photosensitivity, PERRLA, EOMI with generalized pain, no conjunctival injection or periorbital swelling  Neurological: Alert and Oriented x 4, no facial assymetry, normal speech, muscle strength in upper and lower extremities 3/5, contractures of the foot, able to spontaneously move all extremities and move body for exam positioning  Skin: erythematous fungal rashes in bilateral axillary areas, + sacral wound     Assessment and Plan   # Left eye pain  Patient presents with acute left eye pain which is likely due to a flare-up of patient's optic neuropathy which could have been provoked with the change of prednisone dose " over the last couple of days versus atypical migraine. CT head is unremarkable. There is low suspicion for orbital cellulitis given lack of symptoms and signs on exam. Due to spinal stimulators, patient cannot get an MRI.   - Check ESR/CRP  - Pain control   - Serial neurochecks   - Trial of steroids   - Consider neuro-ophthalmology consult in AM      Please see Dr. Levine's H&P for full details

## 2020-04-21 NOTE — PROGRESS NOTES
Patient arrived to unit at ~ 2130.  PROM exercises performed to bilateral feet prior to a stand pivot from the gurney to the bed, which patient tolerated well.  She had reported that she hasn't walked since December, but completed the task with staff encouragement.   Patient states that she came from home, not the [facility].  She stated that she was 'supposed to be there, but I got pneumonia, so I checked myself out and went to my grandmother's house'.  She reported 'bed sores on my butt', but assessment revealed incontinence associated dermatitis and some excoriation.  When asked how things were going at home, she said, 'not very well, my grandpa's dying'.  Patient is cared for at home by her elderly grandmother, who also takes care of her  with the assistance of home care aides.  Regarding her prednisone taper, she said that she was supposed to be taking 20 mg and instead only took 5mg.  During conversation, patient did seem knowledgeable about what the plan was, and admitted that she didn't do it.  Her mood has been pleasant, behavioral limits were set upon arrival to the floor, and she has been compliant.  She is sleeping at the time of this note with call light in reach, bed alarm on for safety.

## 2020-04-21 NOTE — ASSESSMENT & PLAN NOTE
- TSH 0.612  - On Levothyroxine 100 mcg, the dose was increased on 3/3/2020  - Continue levothyroxine

## 2020-04-21 NOTE — CARE PLAN
Problem: Communication  Goal: The ability to communicate needs accurately and effectively will improve  Outcome: PROGRESSING AS EXPECTED     Problem: Safety  Goal: Will remain free from injury  Outcome: PROGRESSING AS EXPECTED  Goal: Will remain free from falls  Outcome: PROGRESSING AS EXPECTED  Note: Hourly rounding in place, activity reviewed with patient prior to moving, ROM provided to B feet to assist with flexibility and balance.        Problem: Infection  Goal: Will remain free from infection  Outcome: PROGRESSING AS EXPECTED     Problem: Venous Thromboembolism (VTW)/Deep Vein Thrombosis (DVT) Prevention:  Goal: Patient will participate in Venous Thrombosis (VTE)/Deep Vein Thrombosis (DVT)Prevention Measures  Outcome: PROGRESSING AS EXPECTED     Problem: Bowel/Gastric:  Goal: Normal bowel function is maintained or improved  Outcome: PROGRESSING AS EXPECTED  Goal: Will not experience complications related to bowel motility  Outcome: PROGRESSING AS EXPECTED

## 2020-04-21 NOTE — PROGRESS NOTES
Daily Progress Note:     Date of Service: 4/21/2020  Primary Team: [facility] IM Red Team    Attending: ANDERSON Cagle   Senior Resident: Dr. Giles  Intern: Dr. Sanchez  Contact:  [phone]    Chief Complaint:   Chronic Relapsing Inflammatory Optic Neuropathy    Subjective:  No acute events overnight reported.  Patient has been seen for multiple similar episodes in the past several months.  Patient stated acute onset of left eye pain, blurry vision and mild swelling. Pain was severe 10/10.  Pain worsened with eye movement, also sensitivity to light.  In the ED labs were remarkable for Bicarb of 17, likely secondary to Acetazolamide use, patient currently taking 1 gr BID for Idiopathic Intra Cranial Hypertension.  Patient was supposed to see Dr Jay yesterday for follow up, but unable due to acute onset of pain, patient stated that she called the office to let them know.  Patient was placed on IV Methylprednisolone.  This morning significant improvement in her symptoms. Patient still states pain, but has decreased from 13 to 10 according to her. Patient looked much better at around 11 am, she had PT got out of bed and went outside her room on a wheelchair.    Consultants/Specialty:  Neurology     Review of Systems:      Review of Systems   Constitutional: Negative for chills, fever and malaise/fatigue.   HENT: Negative for ear pain, hearing loss, nosebleeds, sore throat and tinnitus.    Eyes: Positive for blurred vision, double vision, photophobia and pain. Negative for discharge and redness.   Respiratory: Negative for cough, hemoptysis, sputum production and shortness of breath.    Cardiovascular: Negative for chest pain, palpitations, orthopnea and leg swelling.   Gastrointestinal: Negative for abdominal pain, heartburn, nausea and vomiting.   Genitourinary: Negative for dysuria, frequency, hematuria and urgency.   Musculoskeletal: Negative for joint pain and myalgias.   Skin: Negative for itching  and rash.   Neurological: Positive for weakness. Negative for dizziness, sensory change, speech change, focal weakness and headaches.   Endo/Heme/Allergies: Does not bruise/bleed easily.   Psychiatric/Behavioral: Negative for depression, hallucinations and suicidal ideas.       Objective Data:   Physical Exam:   Vitals:   Temp:  [36.1 °C (96.9 °F)-37.2 °C (98.9 °F)] 36.3 °C (97.3 °F)  Pulse:  [63-98] 98  Resp:  [16-20] 18  BP: ()/(50-83) 128/72  SpO2:  [94 %-99 %] 94 %     Physical Exam  Constitutional:       General: She is not in acute distress.     Appearance: Normal appearance. She is obese. She is not ill-appearing, toxic-appearing or diaphoretic.   HENT:      Head: Normocephalic and atraumatic.      Nose: Nose normal. No congestion or rhinorrhea.      Mouth/Throat:      Mouth: Mucous membranes are moist.      Pharynx: Oropharynx is clear. No oropharyngeal exudate or posterior oropharyngeal erythema.   Eyes:      General: No scleral icterus.     Extraocular Movements: Extraocular movements intact.      Conjunctiva/sclera: Conjunctivae normal.      Pupils: Pupils are equal, round, and reactive to light.      Comments: States mild/moderate pain with eye movement, but improved. Able to  her eyes bilaterally without limitations. Vision apparently not affected during my examination.   Neck:      Musculoskeletal: Normal range of motion and neck supple. No neck rigidity or muscular tenderness.   Cardiovascular:      Rate and Rhythm: Normal rate and regular rhythm.      Pulses: Normal pulses.      Heart sounds: Normal heart sounds. No murmur. No friction rub. No gallop.    Pulmonary:      Effort: Pulmonary effort is normal. No respiratory distress.      Breath sounds: Normal breath sounds. No stridor. No wheezing, rhonchi or rales.   Abdominal:      General: Bowel sounds are normal. There is no distension.      Palpations: Abdomen is soft.      Tenderness: There is no abdominal tenderness. There is no  guarding or rebound.   Musculoskeletal: Normal range of motion.         General: No swelling, tenderness or deformity.      Right lower leg: No edema.      Left lower leg: No edema.      Comments: Apparent bilateral muscle strength is diminished equally lower extremities, likely just deconditioning, patient has been bed ridden for most of the part since past December. Upper extremities intact.  Sensation intact.   Skin:     General: Skin is warm.      Capillary Refill: Capillary refill takes less than 2 seconds.      Coloration: Skin is not jaundiced or pale.      Findings: No bruising or lesion.   Neurological:      General: No focal deficit present.      Mental Status: She is alert and oriented to person, place, and time. Mental status is at baseline.      Cranial Nerves: No cranial nerve deficit.      Sensory: No sensory deficit.      Motor: No weakness.      Deep Tendon Reflexes: Reflexes normal.   Psychiatric:         Mood and Affect: Mood normal.         Behavior: Behavior normal.         Judgment: Judgment normal.           Labs:   Review    Imaging:   Review    * Optic neuritis- (present on admission)  Assessment & Plan  - Chronic relapsing inflammatory optic neuropathy (CRION) with multiple relapses over the past several months  - The patient presented today with painful, blurry left eye, the pain is worse with eye movements.  - Recurrent admissions to the hospital for similar complaints, She is on daily dose of prednisone 5 mg.  - MRI brain couldn't be done because of her spinal stimulator.  - CT head orderd and didn't show acute intracranial abnormalities.  - Patient had an appointment with Dr Yousif, but unable to attend due to her acute pain.          PLAN:  - Methylprednisolone 25o mg Q6 hrs x e days  - Oral prednisone tapering   - Neurology Consult  - Neuro[place] Q4 hrs  - CTM  - Pain control        Intracranial hypertension  Assessment & Plan  - Recurrent idiopathic intracranial hypertension  unassociated with papilledema  - Was previously treated with lumbar puncture, still on diamox  - No headaches this admission  - CT head unremarkable 4/20/2020  - Continue home dose      TONYA (obstructive sleep apnea)- (present on admission)  Assessment & Plan  - Not on CPAP at home.  - CTM  -    DM (diabetes mellitus) (HCC)- (present on admission)  Assessment & Plan  - Diabetes type 2  - Most recent Hemoglobin A1c is 6 on 8/29/2019  - On Trulicity at home  - Will hold Trulicity  while inpatient  - Glucose monitoring  - Diabetic diet    H/O prior ablation treatment- (present on admission)  Assessment & Plan  - Patient is a previous history of SVT s/p ablation  - On Ivabradine  - Denied palpitations, SOB, chest pain.      Intertriginous candidiasis  Assessment & Plan  Bilateral redness under arms bilaterally  Nystatin powder to be applied.    Restrictive lung disease  Assessment & Plan  - Pulmonary function tests are consistent with mild restrictive  process and may be secondary to obesity.  - PFTs were done on 9/5/2019 and showed  mild reduction in FEV1 at 79% of   predicted and FVC at 76% of predicted with an FEV1/FVC ratio of   88, no significant bronchodilator response, low lung volumes, with Total lung capacity of 83%, DLCO is low normal at 85% of predicted.  On home inhalers, will resume while inpatient.    Debility- (present on admission)  Assessment & Plan  - The patient reported that she is bed bound since December 2019  - Likely just deconditioning   - Multifactorial in nature, multiple co morbidities including morbid obesity and psychiatric problems   PT/OT.    Hypothyroidism- (present on admission)  Assessment & Plan  - TSH 0.612  - On Levothyroxine 100 mcg, the dose was increased on 3/3/2020  - Continue levothyroxine      Peripheral neuropathy and chornic pain syndrome (CMS-HCC)- (present on admission)  Assessment & Plan  - It could be secondary to High dose Acetazolamide   - Continue home lyrica &  gabapentin.

## 2020-04-21 NOTE — ASSESSMENT & PLAN NOTE
- Diabetes type 2  - Most recent Hemoglobin A1c is 6 on 8/29/2019  - On Trulicity at home  - Will hold Trulicity  while inpatient  - Glucose monitoring  - Blood glucose below 140-180's inpatient  - Diabetic diet  - CTM

## 2020-04-21 NOTE — ED NOTES
Med rec updated and complete. Allergies reviewed. VIA phone call to pt.   Pt stated  About 17 days ago she  Finished a course of doxycyline . Pt does not  Recall having a reaction to the antibiotic.    [facility].

## 2020-04-21 NOTE — H&P
History & Physical Note    Date of Admission: 4/20/2020  Admission Status: Inpatient  [facility] Team: [facility] Red Team   Attending: Vianey Duggan  Senior Resident: Dr. Giles  Intern: Dr. Sanchez  Contact Number: [phone]    Chief Complaint:   Left eye pain and double vision     History of Present Illness (HPI):   Kristin is a 29 y.o. female with complex PMH of Chronic relapsing inflammatory optic neuropathy (CRION),  hypothyroidism, DM2, TONYA, obesity, Asthma, chronic hip and back pain, SVT s/p ablation, hemiplegic migraine, intracranial hypertension, and multiple psychiatric issues who presented to the ED with left eye pain and swelling that started last night. The patient reported sharp pain 10/10 in severity around the left eye, radiates to the left side of the head, aggravated by eye movements, alleviated with morphine, associated with blurry/double vision, bilateral arms and legs numbness, sensitivity to light, and vomiting.    Of note, She just completed a 5 days course of antibiotics and  prednisone 20 mg for bronchitis/ pneumonia, and started her usual 5 mg. She had multiple recent admissions over the past several months for similar complaints.     she denied eye tearing, nasal congestion, fever, chills, head trauma, focal weakness, dizziness, chest pain, shortness of breath.    Upon presentation to the ED, the patient was hemodynamically stable with no acute lab derangements, 2 doses of morphine 4 mg, 1L of NS, and IV zofran  were give. CT head orderd and didn't show acute intracranial abnormalities      Review of Systems:   Review of Systems   Constitutional: Negative for chills, fever and weight loss.   HENT: Negative for hearing loss and tinnitus.    Eyes: Positive for blurred vision, double vision, photophobia and pain. Negative for discharge and redness.   Respiratory: Positive for cough and shortness of breath. Negative for hemoptysis, sputum production and wheezing.    Cardiovascular:  Negative for chest pain, palpitations, orthopnea and claudication.   Gastrointestinal: Negative for abdominal pain, diarrhea, heartburn, nausea and vomiting.   Genitourinary: Negative for dysuria.   Musculoskeletal: Negative for myalgias and neck pain.   Neurological: Positive for tingling, tremors and sensory change. Negative for dizziness, speech change, focal weakness, seizures, loss of consciousness, weakness and headaches.   Psychiatric/Behavioral: Positive for depression.         Past Medical History:   Past Medical History was reviewed with patient.   has a past medical history of Abdominal pain, Anginal syndrome, Apnea, sleep, Arrhythmia, Arthritis, ASTHMA, Atrial fibrillation ([facility]), Back pain, Borderline personality disorder ([facility]), Breath shortness, Bronchitis, Cardiac arrhythmia, Chickenpox, Chronic UTI (9/18/2010), Cough, Daytime sleepiness, Depression, Diabetes ([facility]), Diarrhea, Disorder of thyroid, Fall, Fatigue, Frequent headaches, Gasping for breath, Gynecological disorder, Headache(784.0), Heart burn, History of falling, Hypertension, Indigestion, Migraine, 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[facility]), 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    Past Surgical History: Past Surgical History was reviewed with patient.   has a past surgical history that includes neuro dest facet l/s w/ig sngl (4/21/2015); recovery (1/27/2016); [place] by laparoscopy (8/29/2010); lumbar fusion anterior (8/21/2012); other cardiac surgery (1/2016); tonsillectomy; bowel stimulator insertion (7/13/2016); gastroscopy with balloon dilatation (N/A, 1/4/2017); muscle biopsy (Right, 1/26/2017); other abdominal  surgery; and laminotomy.    Medications: Medications have been reviewed with patient.  Prior to Admission Medications   Prescriptions Last Dose Informant Patient Reported? Taking?   Dulaglutide (TRULICITY) 1.5 MG/0.5ML Solution Pen-injector 4/17/2020 at 1000 Patient Yes No   Sig: Inject 1.5 mg as instructed every Friday.   Ivabradine HCl (CORLANOR) 7.5 MG Tab 4/20/2020 at 10603 Patient No No   Sig: Take 7.5 mg by mouth 2 Times a Day. Needs to schedule follow up with Dr. Wilde   Melatonin 10 MG Tab 4/19/2020 at 2100 Patient Yes No   Sig: Take 10 mg by mouth every evening.   OXcarbazepine (TRILEPTAL) 150 MG Tab 4/20/2020 at 1000 Patient No No   Sig: Take 1 Tab by mouth 2 Times a Day.   acetaZOLAMIDE (DIAMOX) 250 MG Tab 4/20/2020 at 1000 Patient Yes Yes   Sig: Take 1,000 mg by mouth 2 times a day.   albuterol 108 (90 Base) MCG/ACT Aero Soln inhalation aerosol 4/20/2020 at 1300 Patient Yes No   Sig: Inhale 2 Puffs by mouth every 6 hours as needed for Shortness of Breath.   aspirin EC (ECOTRIN) 81 MG Tablet Delayed Response 4/20/2020 at 1000 Patient Yes No   Sig: Take 81 mg by mouth every day.   budesonide-formoterol (SYMBICORT) 160-4.5 MCG/ACT Aerosol 4/20/2020 at 1000 Patient Yes No   Sig: Inhale 2 Puffs by mouth 2 Times a Day.   busPIRone (BUSPAR) 10 MG Tab tablet 4/20/2020 at 1000 Patient No No   Sig: TAKE ONE TABLET BY MOUTH TWICE DAILY   etonogestrel (NEXPLANON) 68 MG Implant implant 4/20/2020 at conituous Patient Yes No   Sig: Inject 1 Each as instructed Once.   fluoxetine (PROZAC) 40 MG capsule 4/20/2020 at 1000 Patient No No   Sig: TAKE ONE CAPSULE BY MOUTH ONCE A DAY   furosemide (LASIX) 80 MG Tab 4/20/2020 at 1000 Patient Yes No   Sig: Take 80 mg by mouth 1 time daily as needed.   ipratropium-albuterol (DUONEB) 0.5-2.5 (3) MG/3ML nebulizer solution 4/20/2020 at 1100 Patient No No   Sig: 3 mL by Nebulization route every 6 hours as needed for Shortness of Breath.   levothyroxine (SYNTHROID) 100 MCG Tab  4/20/2020 at 0900 Patient No No   Sig: Take 1 Tab by mouth Every morning on an empty stomach.   modafinil (PROVIGIL) 200 MG Tab 4/20/2020 at 1000 Patient No No   Sig: Take 1 Tab by mouth every morning for 60 days.   mycophenolate (CELLCEPT) 500 MG tablet 4/20/2020 at 35151 Patient Yes No   Sig: Take 1,000 mg by mouth 2 times a day.   ondansetron (ZOFRAN ODT) 4 MG TABLET DISPERSIBLE 4/20/2020 at 1100 Patient Yes No   Sig: Take 4 mg by mouth every 6 hours as needed for Nausea.   potassium chloride SA (KDUR) 20 MEQ Tab CR 4/20/2020 at 1000 Patient Yes No   Sig: Take 20 mEq by mouth 2 times a day.   predniSONE 5 MG Tablet Delayed Response 4/20/2020 at 1000 Patient No No   Sig: Take 5 mg by mouth every day.   pregabalin (LYRICA) 300 MG capsule 4/20/2020 at 1000 Patient Yes No   Sig: Take 300 mg by mouth 2 times a day.   promethazine (PHENERGAN) 25 MG Suppos 4/19/2020 at 1630 Patient No No   Sig: Insert 1 Suppository in rectum every 6 hours as needed for Nausea/Vomiting.   senna-docusate (PERICOLACE OR SENOKOT S) 8.6-50 MG Tab 4/20/2020 at 1000 Patient Yes Yes   Sig: Take 2 Tabs by mouth 2 times a day.   tiotropium (SPIRIVA) 18 MCG Cap 4/20/2020 at 1000 Patient No No   Sig: Inhale 1 Cap by mouth every day.   traZODone (DESYREL) 100 MG Tab 4/19/2020 at 2100 Patient No No   Sig: Take 0.5 Tabs by mouth every evening.   vitamin D (VITAMIND D3) 1000 UNIT Tab 4/20/2020 at 1000 Patient No No   Sig: Take 1 Tab by mouth every day.   ziprasidone (GEODON) 40 MG Cap 4/20/2020 at 1000 Patient No No   Sig: TAKE ONE CAPSULE BY MOUTH TWICE DAILY      Facility-Administered Medications: None        Allergies: Allergies have been reviewed with patient.  Allergies   Allergen Reactions   • Cefdinir Shortness of Breath and Itching     Tolerated 1/18/17  Tolerates ceftriaxone  Tolerated augmentin 8/2019    • Depakote [Divalproex Sodium] Unspecified     Muscle spasms/muscle pain and weakness     • Doxycycline Anaphylaxis and Vomiting      "RXN=unknown   • Amitriptyline Unspecified     Headaches     • Aripiprazole [Abilify] Unspecified     Headaches/muscle twitching     • Clindamycin Nausea     Even with food     • Flagyl [Metronidazole Hcl] Unspecified     \"eye problems\"     • Flomax [Tamsulosin Hydrochloride] Swelling   • Levaquin Unspecified     Severe muscle cramps in legs  RXN=unknown   • Metformin Unspecified     Increased lactic acid      • Tape Rash     Tears skin off  coban with Tegaderm tape ok intermittently  RXN=ongoing   • Vancomycin Itching     Pt becomes flushed in face and chest.   RXN=7/10/16   • Wound Dressing Adhesive Hives     By pt report   • Ciprofloxacin    • Depakote  [Divalproex Sodium]    • Hydromorphone Hcl    • Kdc:Metronidazole+Tartrazine    • Keflex Rash     Pt states she gets a rash with this medication  Tolerates ceftriaxone   • Levofloxacin Unspecified     Leg muscle cramps   • Penicillins        Family History:   family history includes Genitourinary () Problems in her sister; Heart Disease in her maternal grandmother and mother; Hypertension in her maternal grandmother, maternal uncle, and mother; No Known Problems in her sister; Other in her mother and sister; Sleep Apnea in her mother.     Social History:   Tobacco: none   Alcohol: none   Recreational drugs (illegal and prescription):  none  Employment: none  Activity Level: bed-bound  Living situation:  Lives with her grandmother who takes care of   Recent travel:  none  Primary Care Provider: reviewed Torres Brody M.D.  Other (stressors, spirituality, exposures):  none  Physical Exam:   Vitals:  Temp:  [36.1 °C (96.9 °F)-37.2 °C (98.9 °F)] 37.2 °C (98.9 °F)  Pulse:  [63-74] 73  Resp:  [18-20] 19  BP: ()/(50-83) 98/50  SpO2:  [94 %-99 %] 96 %    Physical Exam  Constitutional:       General: She is not in acute distress.     Appearance: She is obese. She is not ill-appearing, toxic-appearing or diaphoretic.   HENT:      Head: Normocephalic and atraumatic. "      Right Ear: Tympanic membrane normal.      Left Ear: Tympanic membrane normal.      Nose: Nose normal.   Eyes:      General:         Right eye: No discharge.         Left eye: No discharge.      Extraocular Movements: Extraocular movements intact.      Conjunctiva/sclera: Conjunctivae normal.      Pupils: Pupils are equal, round, and reactive to light.      Comments: Painful eye movements   Neck:      Musculoskeletal: No neck rigidity or muscular tenderness.   Cardiovascular:      Rate and Rhythm: Normal rate.      Pulses: Normal pulses.      Heart sounds: No murmur. No friction rub. No gallop.    Pulmonary:      Effort: No respiratory distress.      Breath sounds: No stridor. No wheezing or rhonchi.   Abdominal:      General: There is distension.      Palpations: There is no mass.      Tenderness: There is no abdominal tenderness.      Comments: Obese   Musculoskeletal:         General: Swelling present.      Comments: Nail changes  Mid line sacral skin changes(pressure sore)    Skin:     Comments: Nail changes  Mid line sacral skin changes(pressure sore)   intertriginous candidiasis underarms    Neurological:      General: No focal deficit present.      Mental Status: She is alert and oriented to person, place, and time.      Cranial Nerves: No cranial nerve deficit.      Sensory: No sensory deficit.      Comments: Power 3/5 throughout.   Psychiatric:         Behavior: Behavior normal.         Labs:   Recent Labs     04/20/20  1700   WBC 7.0   RBC 4.37   HEMOGLOBIN 13.3   HEMATOCRIT 40.2   MCV 92.0   MCH 30.4   RDW 48.4   PLATELETCT 175   MPV 11.6   NEUTSPOLYS 71.10   LYMPHOCYTES 19.80*   MONOCYTES 7.90   EOSINOPHILS 0.70   BASOPHILS 0.10     Recent Labs     04/20/20  1700   SODIUM 140   POTASSIUM 3.7   CHLORIDE 108   CO2 17*   GLUCOSE 93   BUN 11         EKG:   Not done    Imaging:   CT-HEAD W/O   Final Result      No evidence of acute intracranial process.            Previous Data Review: reviewed    * Optic  neuritis- (present on admission)  Assessment & Plan  Chronic relapsing inflammatory optic neuropathy (CRION) with multiple relapses over the past several months  The patient presented today with painful, blurry left eye, the pain is worse with eye movements.  Recurrent admissions to the hospital for similar complaints, She is on daily dose of prednisone 5 mg.  MRI brain couldn't be done because of her spinal stimulator.  CT head orderd and didn't show acute intracranial abnormalities.  Will start high dose methylprednisolone 250 mg Q6 h X 3 days, the day team to decide whether to continue methyl prednisolone.      Intracranial hypertension  Assessment & Plan  Recurrent idiopathic intracranial hypertension unassociated with papilledema  Was previously treated with lumbar puncture, still on diamox  Continue home dose      TONYA (obstructive sleep apnea)- (present on admission)  Assessment & Plan  Not on CPAP at home.    DM (diabetes mellitus) ([facility])- (present on admission)  Assessment & Plan  Diabetes type 2  Most recent Hemoglobin A1c is 6 on 8/29/2019  On Trulicity at home  Will hold Trulicity  while inpatient  Glucose monitoring    H/O prior ablation treatment- (present on admission)  Assessment & Plan  Patient is a previous history of SVT s/p ablation  On Ivabradine      Intertriginous candidiasis  Assessment & Plan  Bilateral redness under arms bilaterally  Nystatin powder to be applied.    Restrictive lung disease  Assessment & Plan  - Pulmonary function tests are consistent with mild restrictive  process and may be secondary to obesity.  - PFTs were done on 9/5/2019 and showed  mild reduction in FEV1 at 79% of   predicted and FVC at 76% of predicted with an FEV1/FVC ratio of   88, no significant bronchodilator response, low lung volumes, with Total lung capacity of 83%, DLCO is low normal at 85% of predicted.  On home inhalers, will resume while inpatient.    Debility- (present on admission)  Assessment &  Plan  The patient reported that she is bed bound since December 2019  Multifactorial in nature, multiple co morbidities including morbid obesity and psychiatric problems   PT/OT.    Hypothyroidism- (present on admission)  Assessment & Plan  TSH 5.65  On Levothyroxine 100 mcg, the dose was increased on 3/3/2020  Continue levothyroxine      Peripheral neuropathy and chornic pain syndrome (CMS-HCC)- (present on admission)  Assessment & Plan  -Continue home lyrica & gabapentin.

## 2020-04-21 NOTE — ED NOTES
Pt taken to [address] [facility] via gurney by transport.  Pt incontinent of urine prior to transfer.  Pt cleaned and changed, new linens provided.  Report was called per flowsheets.  VSS at transfer.

## 2020-04-21 NOTE — ASSESSMENT & PLAN NOTE
- Patient is a previous history of SVT s/p ablation  - On Ivabradine  - Denied palpitations, SOB, chest pain.

## 2020-04-21 NOTE — ASSESSMENT & PLAN NOTE
- Pulmonary function tests are consistent with mild restrictive  process and may be secondary to obesity.  - PFTs were done on 9/5/2019 and showed  mild reduction in FEV1 at 79% of   predicted and FVC at 76% of predicted with an FEV1/FVC ratio of   88, no significant bronchodilator response, low lung volumes, with Total lung capacity of 83%, DLCO is low normal at 85% of predicted.  On home inhalers, will resume while inpatient.

## 2020-04-22 ENCOUNTER — APPOINTMENT (OUTPATIENT)
Dept: RADIOLOGY | Facility: MEDICAL CENTER | Age: 30
DRG: 123 | End: 2020-04-22
Attending: INTERNAL MEDICINE
Payer: MEDICARE

## 2020-04-22 LAB
ALBUMIN SERPL BCP-MCNC: 3.8 G/DL (ref 3.2–4.9)
ALBUMIN/GLOB SERPL: 2 G/DL
ALP SERPL-CCNC: 56 U/L (ref 30–99)
ALT SERPL-CCNC: 76 U/L (ref 2–50)
ANION GAP SERPL CALC-SCNC: 16 MMOL/L (ref 7–16)
AST SERPL-CCNC: 18 U/L (ref 12–45)
BACTERIA BLD CULT: NORMAL
BACTERIA BLD CULT: NORMAL
BILIRUB SERPL-MCNC: 0.2 MG/DL (ref 0.1–1.5)
BUN SERPL-MCNC: 14 MG/DL (ref 8–22)
CALCIUM SERPL-MCNC: 8.9 MG/DL (ref 8.5–10.5)
CHLORIDE SERPL-SCNC: 109 MMOL/L (ref 96–112)
CO2 SERPL-SCNC: 12 MMOL/L (ref 20–33)
CREAT SERPL-MCNC: 0.68 MG/DL (ref 0.5–1.4)
GLOBULIN SER CALC-MCNC: 1.9 G/DL (ref 1.9–3.5)
GLUCOSE SERPL-MCNC: 192 MG/DL (ref 65–99)
POTASSIUM SERPL-SCNC: 3.3 MMOL/L (ref 3.6–5.5)
PROT SERPL-MCNC: 5.7 G/DL (ref 6–8.2)
SIGNIFICANT IND 70042: NORMAL
SIGNIFICANT IND 70042: NORMAL
SITE SITE: NORMAL
SITE SITE: NORMAL
SODIUM SERPL-SCNC: 137 MMOL/L (ref 135–145)
SOURCE SOURCE: NORMAL
SOURCE SOURCE: NORMAL

## 2020-04-22 PROCEDURE — 700105 HCHG RX REV CODE 258: Performed by: STUDENT IN AN ORGANIZED HEALTH CARE EDUCATION/TRAINING PROGRAM

## 2020-04-22 PROCEDURE — A9270 NON-COVERED ITEM OR SERVICE: HCPCS | Performed by: STUDENT IN AN ORGANIZED HEALTH CARE EDUCATION/TRAINING PROGRAM

## 2020-04-22 PROCEDURE — 36415 COLL VENOUS BLD VENIPUNCTURE: CPT

## 2020-04-22 PROCEDURE — 80053 COMPREHEN METABOLIC PANEL: CPT

## 2020-04-22 PROCEDURE — 770006 HCHG ROOM/CARE - MED/SURG/GYN SEMI*

## 2020-04-22 PROCEDURE — 700111 HCHG RX REV CODE 636 W/ 250 OVERRIDE (IP): Performed by: STUDENT IN AN ORGANIZED HEALTH CARE EDUCATION/TRAINING PROGRAM

## 2020-04-22 PROCEDURE — 700102 HCHG RX REV CODE 250 W/ 637 OVERRIDE(OP): Performed by: STUDENT IN AN ORGANIZED HEALTH CARE EDUCATION/TRAINING PROGRAM

## 2020-04-22 PROCEDURE — 99233 SBSQ HOSP IP/OBS HIGH 50: CPT | Mod: GC | Performed by: INTERNAL MEDICINE

## 2020-04-22 RX ORDER — POTASSIUM CHLORIDE 20 MEQ/1
40 TABLET, EXTENDED RELEASE ORAL ONCE
Status: COMPLETED | OUTPATIENT
Start: 2020-04-22 | End: 2020-04-22

## 2020-04-22 RX ORDER — MORPHINE SULFATE 4 MG/ML
1 INJECTION, SOLUTION INTRAMUSCULAR; INTRAVENOUS ONCE
Status: COMPLETED | OUTPATIENT
Start: 2020-04-22 | End: 2020-04-22

## 2020-04-22 RX ADMIN — ZIPRASIDONE HYDROCHLORIDE 40 MG: 40 CAPSULE ORAL at 05:31

## 2020-04-22 RX ADMIN — FLUOXETINE HYDROCHLORIDE 40 MG: 20 CAPSULE ORAL at 05:32

## 2020-04-22 RX ADMIN — MICONAZOLE NITRATE: 20 CREAM TOPICAL at 17:18

## 2020-04-22 RX ADMIN — ONDANSETRON HYDROCHLORIDE 4 MG: 2 INJECTION INTRAMUSCULAR; INTRAVENOUS at 02:48

## 2020-04-22 RX ADMIN — ASPIRIN 81 MG: 81 TABLET, COATED ORAL at 05:33

## 2020-04-22 RX ADMIN — ACETAMINOPHEN 1000 MG: 500 TABLET ORAL at 05:31

## 2020-04-22 RX ADMIN — PREGABALIN 300 MG: 150 CAPSULE ORAL at 05:32

## 2020-04-22 RX ADMIN — MYCOPHENOLATE MOFETIL 1000 MG: 250 CAPSULE ORAL at 21:01

## 2020-04-22 RX ADMIN — SENNOSIDES AND DOCUSATE SODIUM 2 TABLET: 8.6; 5 TABLET ORAL at 05:32

## 2020-04-22 RX ADMIN — SODIUM CHLORIDE 250 MG: 9 INJECTION, SOLUTION INTRAVENOUS at 00:00

## 2020-04-22 RX ADMIN — MORPHINE SULFATE 1 MG: 4 INJECTION INTRAVENOUS at 23:07

## 2020-04-22 RX ADMIN — LEVOTHYROXINE SODIUM 100 MCG: 100 TABLET ORAL at 05:31

## 2020-04-22 RX ADMIN — ACETAMINOPHEN 1000 MG: 500 TABLET ORAL at 11:42

## 2020-04-22 RX ADMIN — POTASSIUM CHLORIDE 40 MEQ: 1500 TABLET, EXTENDED RELEASE ORAL at 08:19

## 2020-04-22 RX ADMIN — ONDANSETRON HYDROCHLORIDE 4 MG: 2 INJECTION INTRAMUSCULAR; INTRAVENOUS at 11:42

## 2020-04-22 RX ADMIN — NYSTATIN: 100000 POWDER TOPICAL at 17:18

## 2020-04-22 RX ADMIN — MYCOPHENOLATE MOFETIL 1000 MG: 250 CAPSULE ORAL at 11:42

## 2020-04-22 RX ADMIN — GLYCOPYRROLATE 1 CAPSULE: 15.6 CAPSULE RESPIRATORY (INHALATION) at 08:21

## 2020-04-22 RX ADMIN — BUDESONIDE AND FORMOTEROL FUMARATE DIHYDRATE 2 PUFF: 160; 4.5 AEROSOL RESPIRATORY (INHALATION) at 17:15

## 2020-04-22 RX ADMIN — OXCARBAZEPINE 150 MG: 150 TABLET, FILM COATED ORAL at 17:15

## 2020-04-22 RX ADMIN — ENOXAPARIN SODIUM 40 MG: 100 INJECTION SUBCUTANEOUS at 05:30

## 2020-04-22 RX ADMIN — IVABRADINE 7.5 MG: 5 TABLET, FILM COATED ORAL at 17:14

## 2020-04-22 RX ADMIN — TRAZODONE HYDROCHLORIDE 50 MG: 100 TABLET ORAL at 17:14

## 2020-04-22 RX ADMIN — BUSPIRONE HYDROCHLORIDE 10 MG: 10 TABLET ORAL at 05:33

## 2020-04-22 RX ADMIN — GLYCOPYRROLATE 1 CAPSULE: 15.6 CAPSULE RESPIRATORY (INHALATION) at 21:01

## 2020-04-22 RX ADMIN — SODIUM CHLORIDE 250 MG: 9 INJECTION, SOLUTION INTRAVENOUS at 17:15

## 2020-04-22 RX ADMIN — IVABRADINE 7.5 MG: 5 TABLET, FILM COATED ORAL at 05:30

## 2020-04-22 RX ADMIN — ACETAZOLAMIDE 1000 MG: 250 TABLET ORAL at 17:14

## 2020-04-22 RX ADMIN — ACETAZOLAMIDE 1000 MG: 250 TABLET ORAL at 05:31

## 2020-04-22 RX ADMIN — BUSPIRONE HYDROCHLORIDE 10 MG: 10 TABLET ORAL at 17:14

## 2020-04-22 RX ADMIN — MELATONIN 1000 UNITS: at 05:32

## 2020-04-22 RX ADMIN — MODAFINIL 200 MG: 100 TABLET ORAL at 05:32

## 2020-04-22 RX ADMIN — PREGABALIN 300 MG: 150 CAPSULE ORAL at 17:14

## 2020-04-22 RX ADMIN — ACETAMINOPHEN 1000 MG: 500 TABLET ORAL at 17:14

## 2020-04-22 RX ADMIN — NYSTATIN: 100000 POWDER TOPICAL at 05:29

## 2020-04-22 RX ADMIN — SODIUM CHLORIDE 250 MG: 9 INJECTION, SOLUTION INTRAVENOUS at 23:11

## 2020-04-22 RX ADMIN — SODIUM CHLORIDE 250 MG: 9 INJECTION, SOLUTION INTRAVENOUS at 11:42

## 2020-04-22 RX ADMIN — BUDESONIDE AND FORMOTEROL FUMARATE DIHYDRATE 2 PUFF: 160; 4.5 AEROSOL RESPIRATORY (INHALATION) at 05:28

## 2020-04-22 RX ADMIN — OXCARBAZEPINE 150 MG: 150 TABLET, FILM COATED ORAL at 05:31

## 2020-04-22 RX ADMIN — SODIUM CHLORIDE 250 MG: 9 INJECTION, SOLUTION INTRAVENOUS at 05:29

## 2020-04-22 RX ADMIN — MICONAZOLE NITRATE: 20 CREAM TOPICAL at 05:29

## 2020-04-22 RX ADMIN — ONDANSETRON HYDROCHLORIDE 4 MG: 2 INJECTION INTRAMUSCULAR; INTRAVENOUS at 18:43

## 2020-04-22 ASSESSMENT — ENCOUNTER SYMPTOMS
SHORTNESS OF BREATH: 0
SPEECH CHANGE: 0
HALLUCINATIONS: 0
EYE REDNESS: 0
TINGLING: 0
CHILLS: 0
DEPRESSION: 0
MYALGIAS: 0
DIZZINESS: 0
HEADACHES: 0
HEARTBURN: 0
HEMOPTYSIS: 0
COUGH: 0
PHOTOPHOBIA: 1
BRUISES/BLEEDS EASILY: 0
DIAPHORESIS: 0
BACK PAIN: 0
DOUBLE VISION: 0
VOMITING: 0
EYE PAIN: 1
FOCAL WEAKNESS: 0
FEVER: 0
ABDOMINAL PAIN: 0
BLURRED VISION: 1
NAUSEA: 0
PALPITATIONS: 0
SPUTUM PRODUCTION: 0
SORE THROAT: 0
EYE DISCHARGE: 0
ORTHOPNEA: 0

## 2020-04-22 NOTE — CARE PLAN
Problem: Safety  Goal: Will remain free from falls  Outcome: PROGRESSING AS EXPECTED  Note: Bed alarm on, wheels locked, in lowest position. Non skid socks applied. Call light within reach.      Problem: Urinary Elimination:  Goal: Ability to reestablish a normal urinary elimination pattern will improve  Outcome: PROGRESSING AS EXPECTED  Note: Pt performed ambulation with 2 person assist to commode.

## 2020-04-22 NOTE — PROGRESS NOTES
Daily Progress Note:     Date of Service: 4/22/2020  Primary Team: [facility] Red Team    Attending: ANDERSON Cagle   Senior Resident: Dr. Becker  Intern: Dr. Sanchez  Contact:  [phone]    Chief Complaint:   Chronic Relapsing Inflammatory Optic Neuropathy.    Subjective:  No acute event overnight reported  Patient with significant improvement, pain is tolerable, she is able to keep her left eye open.  Pain 7 out of 10, significant improvement from yesterday.  Neurology assessed patient, agreed with medical treatment plan.  CT orbits Unremarkable. Orders for Aquaporin 4 Ab, anti MOG placed by Dr Koroma.  Patient will follow up with Neuro/Opthalmology today per Dr Yousif IV methylprednisolone 4 days, then Oral prednisone 20mg daily.    Consultants/Specialty:  Neurology  Wound care    Review of Systems:      Review of Systems   Constitutional: Negative for chills, diaphoresis, fever and malaise/fatigue.   HENT: Negative for hearing loss, sore throat and tinnitus.    Eyes: Positive for blurred vision, photophobia and pain. Negative for double vision, discharge and redness.   Respiratory: Negative for cough, hemoptysis, sputum production and shortness of breath.    Cardiovascular: Negative for chest pain, palpitations, orthopnea and leg swelling.   Gastrointestinal: Negative for abdominal pain, heartburn, nausea and vomiting.   Genitourinary: Negative for dysuria, frequency, hematuria and urgency.   Musculoskeletal: Negative for back pain, joint pain and myalgias.   Skin: Negative for itching and rash.   Neurological: Negative for dizziness, tingling, speech change, focal weakness and headaches.   Endo/Heme/Allergies: Does not bruise/bleed easily.   Psychiatric/Behavioral: Negative for depression, hallucinations and suicidal ideas.       Objective Data:   Physical Exam:   Vitals:   Temp:  [36.1 °C (97 °F)-37.1 °C (98.7 °F)] 37.1 °C (98.7 °F)  Pulse:  [63-83] 63  Resp:  [16-18] 16  BP: ()/(45-68)  99/45  SpO2:  [95 %-96 %] 96 %     Physical Exam  Constitutional:       General: She is not in acute distress.     Appearance: She is obese. She is not ill-appearing, toxic-appearing or diaphoretic.   HENT:      Head: Normocephalic and atraumatic.      Nose: Nose normal.      Mouth/Throat:      Mouth: Mucous membranes are moist.      Pharynx: Oropharynx is clear. No oropharyngeal exudate or posterior oropharyngeal erythema.   Eyes:      General: No scleral icterus.     Extraocular Movements: Extraocular movements intact.      Conjunctiva/sclera: Conjunctivae normal.      Pupils: Pupils are equal, round, and reactive to light.   Neck:      Musculoskeletal: Normal range of motion and neck supple. No neck rigidity or muscular tenderness.   Cardiovascular:      Rate and Rhythm: Normal rate and regular rhythm.      Pulses: Normal pulses.      Heart sounds: No murmur. No gallop.    Pulmonary:      Effort: Pulmonary effort is normal. No respiratory distress.      Breath sounds: Normal breath sounds. No wheezing or rales.   Abdominal:      General: Bowel sounds are normal. There is no distension.      Palpations: Abdomen is soft.      Tenderness: There is no abdominal tenderness. There is no guarding or rebound.   Musculoskeletal: Normal range of motion.         General: No swelling or deformity.      Right lower leg: No edema.      Left lower leg: No edema.   Skin:     Capillary Refill: Capillary refill takes less than 2 seconds.      Coloration: Skin is not jaundiced or pale.      Findings: No bruising.   Neurological:      General: No focal deficit present.      Mental Status: She is alert and oriented to person, place, and time. Mental status is at baseline.      Cranial Nerves: No cranial nerve deficit.      Sensory: No sensory deficit.      Motor: No weakness.   Psychiatric:         Mood and Affect: Mood normal.         Behavior: Behavior normal.         Thought Content: Thought content normal.           Labs:    Review    Imaging:   Review      * Optic neuritis- (present on admission)  Assessment & Plan  - Chronic relapsing inflammatory optic neuropathy (CRION) with multiple relapses over the past several months  - The patient presented today with painful, blurry left eye, the pain is worse with eye movements.  - Recurrent admissions to the hospital for similar complaints, She is on daily dose of prednisone 5 mg.  - MRI brain couldn't be done because of her spinal stimulator.  - CT head orderd and didn't show acute intracranial abnormalities.  - Patient had an appointment with Dr Yousif, but unable to attend due to her acute pain.  - CT Orbits 4/21 unremarkable  - Neurology ordered Anti MOG, Aquaporin 4 ab  - Patient will see Dr Yousif today 4/22  - 4/22 patient states improvement, pain is now 7/10  - Patient out of bed in wheelchair, able to transfer on her own.          PLAN:  - Methylprednisolone 250 mg Q6 hrs x 4 days per Dr Yousif  - Oral prednisone 20 mg start on day 5th  - Neurology on board  - Neurochecks Q4 hrs  - CTM  - Pain control        Intracranial hypertension  Assessment & Plan  - Recurrent idiopathic intracranial hypertension unassociated with papilledema  - Was previously treated with lumbar puncture, still on diamox  - No headaches this admission  - CT head unremarkable 4/20/2020  - CT Orbits Unremarkable 4/22/2020  - Labs: Low Bicarb, likely secondary to Acetazolamide   - Continue home dose      TONYA (obstructive sleep apnea)- (present on admission)  Assessment & Plan  - Not on CPAP at home.  - CTM  -    DM (diabetes mellitus) (HCC)- (present on admission)  Assessment & Plan  - Diabetes type 2  - Most recent Hemoglobin A1c is 6 on 8/29/2019  - On Trulicity at home  - Will hold Trulicity  while inpatient  - Glucose monitoring  - Diabetic diet  - CTM    H/O prior ablation treatment- (present on admission)  Assessment & Plan  - Patient is a previous history of SVT s/p ablation  - On Ivabradine  -  Denied palpitations, SOB, chest pain.      Intertriginous candidiasis  Assessment & Plan  Bilateral redness under arms bilaterally  Nystatin powder to be applied.    Restrictive lung disease  Assessment & Plan  - Pulmonary function tests are consistent with mild restrictive  process and may be secondary to obesity.  - PFTs were done on 9/5/2019 and showed  mild reduction in FEV1 at 79% of   predicted and FVC at 76% of predicted with an FEV1/FVC ratio of   88, no significant bronchodilator response, low lung volumes, with Total lung capacity of 83%, DLCO is low normal at 85% of predicted.  On home inhalers, will resume while inpatient.    Debility- (present on admission)  Assessment & Plan  - The patient reported that she is bed bound since December 2019  - Likely just deconditioning   - Multifactorial in nature, multiple co morbidities including morbid obesity and psychiatric problems   - PT/OT No needs from them. Patient able to transfer, stand up, but not able to walk.    Hypothyroidism- (present on admission)  Assessment & Plan  - TSH 0.612  - On Levothyroxine 100 mcg, the dose was increased on 3/3/2020  - Continue levothyroxine      Peripheral neuropathy and chornic pain syndrome (CMS-HCC)- (present on admission)  Assessment & Plan  - It could be secondary to High dose Acetazolamide   - Continue home lyrica & gabapentin.

## 2020-04-22 NOTE — PROGRESS NOTES
Spiritual Care Note    Interacton/Conversation: This morning, Chaplain Layne received message from Spiritual Care admin. that pt's mother is requesting a spiritual care visit for her daughter. This writer will visit pt later today in the afternoon, before 4:30pm.  has reviewed SW note.    Spiritual Care Provider Information:  Name of Spiritual Care Provider: Xi Springer  Title of Spiritual Care Provider: Associate   Phone Number: [phone]  E-mail: [email or web address]    minutes    Spiritual Screen Results:    Gen Nursing  Spiritual Screen  Is your spiritual health or inner well-being important to you as you cope with your medical condition?: Yes  Would you like to receive a visit from our Spiritual Care team or your own Baptism or spiritual leader?: No  Was spiritual care education provided to the patient?: Declined     Palliative Care  PC Mandaeism/Spiritual Screening  Was spiritual care education provided to the patient?: Declined

## 2020-04-23 LAB
ALBUMIN SERPL BCP-MCNC: 3.7 G/DL (ref 3.2–4.9)
ALBUMIN/GLOB SERPL: 2.1 G/DL
ALP SERPL-CCNC: 47 U/L (ref 30–99)
ALT SERPL-CCNC: 66 U/L (ref 2–50)
ANION GAP SERPL CALC-SCNC: 15 MMOL/L (ref 7–16)
AQP4 H2O CHANNEL AB SERPL IA-ACNC: <1.5 U/ML
AST SERPL-CCNC: 14 U/L (ref 12–45)
BILIRUB SERPL-MCNC: 0.2 MG/DL (ref 0.1–1.5)
BUN SERPL-MCNC: 17 MG/DL (ref 8–22)
CALCIUM SERPL-MCNC: 8.8 MG/DL (ref 8.5–10.5)
CHLORIDE SERPL-SCNC: 109 MMOL/L (ref 96–112)
CO2 SERPL-SCNC: 13 MMOL/L (ref 20–33)
CREAT SERPL-MCNC: 0.62 MG/DL (ref 0.5–1.4)
GLOBULIN SER CALC-MCNC: 1.8 G/DL (ref 1.9–3.5)
GLUCOSE BLD-MCNC: 141 MG/DL (ref 65–99)
GLUCOSE SERPL-MCNC: 156 MG/DL (ref 65–99)
POTASSIUM SERPL-SCNC: 3.5 MMOL/L (ref 3.6–5.5)
PROT SERPL-MCNC: 5.5 G/DL (ref 6–8.2)
SODIUM SERPL-SCNC: 137 MMOL/L (ref 135–145)

## 2020-04-23 PROCEDURE — 700102 HCHG RX REV CODE 250 W/ 637 OVERRIDE(OP): Performed by: STUDENT IN AN ORGANIZED HEALTH CARE EDUCATION/TRAINING PROGRAM

## 2020-04-23 PROCEDURE — 700105 HCHG RX REV CODE 258: Performed by: STUDENT IN AN ORGANIZED HEALTH CARE EDUCATION/TRAINING PROGRAM

## 2020-04-23 PROCEDURE — 770006 HCHG ROOM/CARE - MED/SURG/GYN SEMI*

## 2020-04-23 PROCEDURE — 99233 SBSQ HOSP IP/OBS HIGH 50: CPT | Mod: GC | Performed by: INTERNAL MEDICINE

## 2020-04-23 PROCEDURE — A9270 NON-COVERED ITEM OR SERVICE: HCPCS | Performed by: STUDENT IN AN ORGANIZED HEALTH CARE EDUCATION/TRAINING PROGRAM

## 2020-04-23 PROCEDURE — 700111 HCHG RX REV CODE 636 W/ 250 OVERRIDE (IP): Performed by: STUDENT IN AN ORGANIZED HEALTH CARE EDUCATION/TRAINING PROGRAM

## 2020-04-23 PROCEDURE — 82962 GLUCOSE BLOOD TEST: CPT

## 2020-04-23 PROCEDURE — 80053 COMPREHEN METABOLIC PANEL: CPT

## 2020-04-23 PROCEDURE — 36415 COLL VENOUS BLD VENIPUNCTURE: CPT

## 2020-04-23 RX ORDER — POTASSIUM CHLORIDE 20 MEQ/1
40 TABLET, EXTENDED RELEASE ORAL ONCE
Status: COMPLETED | OUTPATIENT
Start: 2020-04-23 | End: 2020-04-23

## 2020-04-23 RX ORDER — ACETAZOLAMIDE 250 MG/1
500 TABLET ORAL 2 TIMES DAILY
Status: DISCONTINUED | OUTPATIENT
Start: 2020-04-23 | End: 2020-04-25 | Stop reason: HOSPADM

## 2020-04-23 RX ORDER — OXYCODONE HYDROCHLORIDE 5 MG/1
5 TABLET ORAL EVERY 6 HOURS PRN
Status: DISCONTINUED | OUTPATIENT
Start: 2020-04-23 | End: 2020-04-25 | Stop reason: HOSPADM

## 2020-04-23 RX ADMIN — FLUOXETINE HYDROCHLORIDE 40 MG: 20 CAPSULE ORAL at 06:24

## 2020-04-23 RX ADMIN — POTASSIUM CHLORIDE 40 MEQ: 1500 TABLET, EXTENDED RELEASE ORAL at 14:02

## 2020-04-23 RX ADMIN — SODIUM CHLORIDE 250 MG: 9 INJECTION, SOLUTION INTRAVENOUS at 06:26

## 2020-04-23 RX ADMIN — BUSPIRONE HYDROCHLORIDE 10 MG: 10 TABLET ORAL at 06:24

## 2020-04-23 RX ADMIN — PREGABALIN 300 MG: 150 CAPSULE ORAL at 06:26

## 2020-04-23 RX ADMIN — MODAFINIL 200 MG: 100 TABLET ORAL at 06:26

## 2020-04-23 RX ADMIN — MELATONIN 1000 UNITS: at 06:26

## 2020-04-23 RX ADMIN — OXCARBAZEPINE 150 MG: 150 TABLET, FILM COATED ORAL at 06:26

## 2020-04-23 RX ADMIN — MICONAZOLE NITRATE: 20 CREAM TOPICAL at 06:36

## 2020-04-23 RX ADMIN — IVABRADINE 7.5 MG: 5 TABLET, FILM COATED ORAL at 06:25

## 2020-04-23 RX ADMIN — OXCARBAZEPINE 150 MG: 150 TABLET, FILM COATED ORAL at 18:12

## 2020-04-23 RX ADMIN — ENOXAPARIN SODIUM 40 MG: 100 INJECTION SUBCUTANEOUS at 06:24

## 2020-04-23 RX ADMIN — MYCOPHENOLATE MOFETIL 1000 MG: 250 CAPSULE ORAL at 11:07

## 2020-04-23 RX ADMIN — ACETAMINOPHEN 1000 MG: 500 TABLET ORAL at 11:37

## 2020-04-23 RX ADMIN — SODIUM CHLORIDE 250 MG: 9 INJECTION, SOLUTION INTRAVENOUS at 11:07

## 2020-04-23 RX ADMIN — ACETAZOLAMIDE 1000 MG: 250 TABLET ORAL at 06:23

## 2020-04-23 RX ADMIN — ONDANSETRON HYDROCHLORIDE 4 MG: 2 INJECTION INTRAMUSCULAR; INTRAVENOUS at 14:20

## 2020-04-23 RX ADMIN — ZIPRASIDONE HYDROCHLORIDE 40 MG: 40 CAPSULE ORAL at 06:26

## 2020-04-23 RX ADMIN — NYSTATIN: 100000 POWDER TOPICAL at 18:14

## 2020-04-23 RX ADMIN — LEVOTHYROXINE SODIUM 100 MCG: 100 TABLET ORAL at 06:24

## 2020-04-23 RX ADMIN — ACETAMINOPHEN 1000 MG: 500 TABLET ORAL at 06:24

## 2020-04-23 RX ADMIN — GLYCOPYRROLATE 1 CAPSULE: 15.6 CAPSULE RESPIRATORY (INHALATION) at 10:08

## 2020-04-23 RX ADMIN — BUDESONIDE AND FORMOTEROL FUMARATE DIHYDRATE 2 PUFF: 160; 4.5 AEROSOL RESPIRATORY (INHALATION) at 06:26

## 2020-04-23 RX ADMIN — ONDANSETRON HYDROCHLORIDE 4 MG: 2 INJECTION INTRAMUSCULAR; INTRAVENOUS at 06:56

## 2020-04-23 RX ADMIN — MYCOPHENOLATE MOFETIL 1000 MG: 250 CAPSULE ORAL at 20:26

## 2020-04-23 RX ADMIN — BUSPIRONE HYDROCHLORIDE 10 MG: 10 TABLET ORAL at 18:11

## 2020-04-23 RX ADMIN — MICONAZOLE NITRATE: 20 CREAM TOPICAL at 18:14

## 2020-04-23 RX ADMIN — SENNOSIDES AND DOCUSATE SODIUM 2 TABLET: 8.6; 5 TABLET ORAL at 06:26

## 2020-04-23 RX ADMIN — PREGABALIN 300 MG: 150 CAPSULE ORAL at 18:10

## 2020-04-23 RX ADMIN — SODIUM CHLORIDE 250 MG: 9 INJECTION, SOLUTION INTRAVENOUS at 23:19

## 2020-04-23 RX ADMIN — OXYCODONE HYDROCHLORIDE 5 MG: 5 TABLET ORAL at 23:23

## 2020-04-23 RX ADMIN — SENNOSIDES AND DOCUSATE SODIUM 2 TABLET: 8.6; 5 TABLET ORAL at 18:12

## 2020-04-23 RX ADMIN — NYSTATIN: 100000 POWDER TOPICAL at 06:26

## 2020-04-23 RX ADMIN — OXYCODONE HYDROCHLORIDE 5 MG: 5 TABLET ORAL at 16:44

## 2020-04-23 RX ADMIN — ACETAZOLAMIDE 500 MG: 250 TABLET ORAL at 18:12

## 2020-04-23 RX ADMIN — IVABRADINE 7.5 MG: 5 TABLET, FILM COATED ORAL at 16:45

## 2020-04-23 RX ADMIN — BUDESONIDE AND FORMOTEROL FUMARATE DIHYDRATE 2 PUFF: 160; 4.5 AEROSOL RESPIRATORY (INHALATION) at 18:14

## 2020-04-23 RX ADMIN — GLYCOPYRROLATE 1 CAPSULE: 15.6 CAPSULE RESPIRATORY (INHALATION) at 20:26

## 2020-04-23 RX ADMIN — ACETAMINOPHEN 1000 MG: 500 TABLET ORAL at 18:13

## 2020-04-23 RX ADMIN — SODIUM CHLORIDE 250 MG: 9 INJECTION, SOLUTION INTRAVENOUS at 18:07

## 2020-04-23 RX ADMIN — ASPIRIN 81 MG: 81 TABLET, COATED ORAL at 06:24

## 2020-04-23 RX ADMIN — TRAZODONE HYDROCHLORIDE 50 MG: 100 TABLET ORAL at 18:11

## 2020-04-23 ASSESSMENT — ENCOUNTER SYMPTOMS
ORTHOPNEA: 0
PALPITATIONS: 0
DIAPHORESIS: 0
MYALGIAS: 0
EYE REDNESS: 0
HEADACHES: 0
BRUISES/BLEEDS EASILY: 0
ABDOMINAL PAIN: 0
WEAKNESS: 1
SORE THROAT: 0
HEMOPTYSIS: 0
SINUS PAIN: 0
CHILLS: 0
NECK PAIN: 0
HEARTBURN: 0
SPUTUM PRODUCTION: 0
NAUSEA: 0
WHEEZING: 0
PHOTOPHOBIA: 1
EYE PAIN: 1
SENSORY CHANGE: 1
SHORTNESS OF BREATH: 0
DIZZINESS: 0
DEPRESSION: 0
FEVER: 0
FOCAL WEAKNESS: 0
TINGLING: 0
HALLUCINATIONS: 0
DOUBLE VISION: 0
COUGH: 1
SPEECH CHANGE: 0
BLURRED VISION: 0
VOMITING: 0
WEIGHT LOSS: 0
EYE DISCHARGE: 0

## 2020-04-23 NOTE — PROGRESS NOTES
Paged [facility] red regarding pt reporting increasing pain, pressure to left side of the face    No orders received, MD will look at the chart to decide if he can add more pain meds

## 2020-04-23 NOTE — PROGRESS NOTES
Daily Progress Note:     Date of Service: 4/23/2020  Primary Team: [facility] Red Team    Attending: DANIE Cagle*   Senior Resident: Dr. Giles  Intern: Dr. Sanchez  Contact:  [phone]    Chief Complaint:   Chronic Relapsing Inflammatory Optic Neuropathy      Subjective:  Patient stated that had a bad night, did not sleep well and also had increased pain, she was given morphine once, stated that helped, but her eye got swollen. This morning I can not see any edema, redness in her left eye, it looks normal.  Otherwise patient feeling somehow tired.  Pain this morning not severe, about same than yesterday, improved.  Vital signs stable, afebrile.   Patient will stay hospitalized till she finishes 4 days of IV Methylprednisolone.  She will be discharged home on Oral prednisone 20mg daily.    Consultants/Specialty:  Neurology  Neuro/opthalmology    Review of Systems:     Review of Systems   Constitutional: Negative for chills, diaphoresis, fever and weight loss.   HENT: Negative for hearing loss, nosebleeds, sinus pain, sore throat and tinnitus.    Eyes: Positive for photophobia and pain. Negative for blurred vision, double vision, discharge and redness.   Respiratory: Positive for cough. Negative for hemoptysis, sputum production, shortness of breath and wheezing.         Stated dry cough last night. I did not hear any coughing while with patient.   Cardiovascular: Negative for chest pain, palpitations, orthopnea and leg swelling.   Gastrointestinal: Negative for abdominal pain, heartburn, nausea and vomiting.   Genitourinary: Negative for dysuria, frequency, hematuria and urgency.   Musculoskeletal: Negative for joint pain, myalgias and neck pain.   Skin: Negative for itching and rash.   Neurological: Positive for sensory change and weakness. Negative for dizziness, tingling, speech change, focal weakness and headaches.   Endo/Heme/Allergies: Does not bruise/bleed easily.   Psychiatric/Behavioral:  Negative for depression, hallucinations and suicidal ideas.       Objective Data:   Physical Exam:   Vitals:   Temp:  [36 °C (96.8 °F)-36.7 °C (98.1 °F)] 36 °C (96.8 °F)  Pulse:  [71-75] 75  Resp:  [16-18] 18  BP: (109-125)/(60-68) 109/63  SpO2:  [94 %-97 %] 94 %     Physical Exam  Constitutional:       General: She is not in acute distress.     Appearance: She is obese. She is not ill-appearing, toxic-appearing or diaphoretic.   HENT:      Head: Normocephalic and atraumatic.      Nose: Nose normal.      Mouth/Throat:      Mouth: Mucous membranes are moist.      Pharynx: Oropharynx is clear. No oropharyngeal exudate or posterior oropharyngeal erythema.   Eyes:      General: No scleral icterus.     Extraocular Movements: Extraocular movements intact.      Conjunctiva/sclera: Conjunctivae normal.      Pupils: Pupils are equal, round, and reactive to light.   Neck:      Musculoskeletal: Normal range of motion and neck supple. No neck rigidity or muscular tenderness.   Cardiovascular:      Rate and Rhythm: Normal rate and regular rhythm.      Pulses: Normal pulses.      Heart sounds: No murmur. No gallop.    Pulmonary:      Effort: Pulmonary effort is normal. No respiratory distress.      Breath sounds: Normal breath sounds. No stridor. No wheezing, rhonchi or rales.   Abdominal:      General: Bowel sounds are normal. There is no distension.      Palpations: Abdomen is soft.      Tenderness: There is no abdominal tenderness. There is no guarding or rebound.   Musculoskeletal: Normal range of motion.         General: No swelling, tenderness or deformity.      Right lower leg: No edema.      Left lower leg: No edema.      Comments: Lower extremities 4/5 muscle strength.  Likely deconditioning    Skin:     General: Skin is warm.      Capillary Refill: Capillary refill takes less than 2 seconds.      Coloration: Skin is not jaundiced.      Findings: No bruising.      Comments: Buttocks redness.   Neurological:      General:  No focal deficit present.      Mental Status: She is alert and oriented to person, place, and time. Mental status is at baseline.      Cranial Nerves: No cranial nerve deficit.   Psychiatric:         Mood and Affect: Mood normal.         Thought Content: Thought content normal.           Labs:   Review    Imaging:   Review    * Optic neuritis- (present on admission)  Assessment & Plan  - Chronic relapsing inflammatory optic neuropathy (CRION) with multiple relapses over the past several months  - The patient presented today with painful, blurry left eye, the pain is worse with eye movements.  - Recurrent admissions to the hospital for similar complaints, She is on daily dose of prednisone 5 mg.  - MRI brain couldn't be done because of her spinal stimulator.  - CT head orderd and didn't show acute intracranial abnormalities.  - Patient had an appointment with Dr Yousif, but unable to attend due to her acute pain.  - CT Orbits 4/21 unremarkable  - Neurology ordered Anti MOG, Aquaporin 4 ab  - Patient will see Dr Yousif today 4/22  - 4/22 patient states improvement, pain is now 7/10  - Patient out of bed in wheelchair, able to transfer on her own.  - 4/23 Patient still with mild/moderate pain.          PLAN:  - Methylprednisolone 250 mg Q6 hrs x 4 days per Dr Yousif  - Oral prednisone 20 mg start on day 5th  - Neurology on board  - Neurochecks Q4 hrs  - CTM  - Pain control        Intracranial hypertension  Assessment & Plan  - Recurrent idiopathic intracranial hypertension unassociated with papilledema  - Was previously treated with lumbar puncture, still on diamox  - No headaches this admission  - CT head unremarkable 4/20/2020  - CT Orbits Unremarkable 4/22/2020  - Labs: Low Bicarb, likely secondary to Acetazolamide               Bicarb mild fluctuations  - Reduce Diamox dose      TONYA (obstructive sleep apnea)- (present on admission)  Assessment & Plan  - Not on CPAP at home.  - CTM  -    DM (diabetes mellitus)  (HCC)- (present on admission)  Assessment & Plan  - Diabetes type 2  - Most recent Hemoglobin A1c is 6 on 8/29/2019  - On Trulicity at home  - Will hold Trulicity  while inpatient  - Glucose monitoring  - Diabetic diet  - CTM    H/O prior ablation treatment- (present on admission)  Assessment & Plan  - Patient is a previous history of SVT s/p ablation  - On Ivabradine  - Denied palpitations, SOB, chest pain.      Intertriginous candidiasis  Assessment & Plan  Bilateral redness under arms bilaterally  Nystatin powder to be applied.    Restrictive lung disease  Assessment & Plan  - Pulmonary function tests are consistent with mild restrictive  process and may be secondary to obesity.  - PFTs were done on 9/5/2019 and showed  mild reduction in FEV1 at 79% of   predicted and FVC at 76% of predicted with an FEV1/FVC ratio of   88, no significant bronchodilator response, low lung volumes, with Total lung capacity of 83%, DLCO is low normal at 85% of predicted.  On home inhalers, will resume while inpatient.    Debility- (present on admission)  Assessment & Plan  - The patient reported that she is bed bound since December 2019  - Likely just deconditioning   - Multifactorial in nature, multiple co morbidities including morbid obesity and psychiatric problems   - 4/22 PT/OT No needs from them. Patient able to transfer, stand up, but not able to walk.  - No changes 4/23    Hypothyroidism- (present on admission)  Assessment & Plan  - TSH 0.612  - On Levothyroxine 100 mcg, the dose was increased on 3/3/2020  - Continue levothyroxine      Peripheral neuropathy and chornic pain syndrome (CMS-HCC)- (present on admission)  Assessment & Plan  - It could be secondary to High dose Acetazolamide   - Continue home lyrica & gabapentin.

## 2020-04-23 NOTE — CARE PLAN
Problem: Communication  Goal: The ability to communicate needs accurately and effectively will improve  Outcome: PROGRESSING AS EXPECTED  Note: Discussed concerns, discussed POC, educated about using call light instead of phones to reach staff. Encouraged to voice concerns.      Problem: Safety  Goal: Will remain free from falls  Outcome: PROGRESSING AS EXPECTED  Note: BED ALARM ON, Frame alarm on, call light within reach, educated to call prior to ambulating, clutter free environment, bed wheels locked

## 2020-04-23 NOTE — PROGRESS NOTES
Page out to R Red for questions/ orders    [id number] MD updated to pt unresolved pain. MD to review orders

## 2020-04-23 NOTE — CARE PLAN
Problem: Communication  Goal: The ability to communicate needs accurately and effectively will improve  Outcome: PROGRESSING AS EXPECTED  Intervention: Reorient patient to environment as needed  Flowsheets (Taken 4/23/2020 1324)  Oriented to:: All of the Following : Location of Bathroom, Visiting Policy, Unit Routine, Call Light and Bedside Controls, Bedside Rail Policy, Smoking Policy, Rights and Responsibilities, Bedside Report, and Patient Education Notebook  Note: Whiteboard updated. Discussed call light system and MD rounding     Problem: Knowledge Deficit  Goal: Knowledge of disease process/condition, treatment plan, diagnostic tests, and medications will improve  Outcome: PROGRESSING AS EXPECTED  Intervention: Explain information regarding disease process/condition, treatment plan, diagnostic tests, and medications and document in education  Note: Discussed daily POC. Discussed daily goals. Pt verbalized understanding.

## 2020-04-23 NOTE — PROGRESS NOTES
Assumed care of patient at bedside report from NOC RN. Updated on POC. Patient currently A & O x 4; on room air; up x1 stand pivot to wheelchair; with complaints of 8/10 left eye discomfort. Call light within reach. Whiteboard updated. Fall precautions in place. Bed locked and in lowest position. All questions answered. No other needs indicated at this time.

## 2020-04-24 ENCOUNTER — APPOINTMENT (OUTPATIENT)
Dept: RADIOLOGY | Facility: MEDICAL CENTER | Age: 30
DRG: 123 | End: 2020-04-24
Attending: STUDENT IN AN ORGANIZED HEALTH CARE EDUCATION/TRAINING PROGRAM
Payer: MEDICARE

## 2020-04-24 ENCOUNTER — PATIENT OUTREACH (OUTPATIENT)
Dept: HEALTH INFORMATION MANAGEMENT | Facility: OTHER | Age: 30
End: 2020-04-24

## 2020-04-24 ENCOUNTER — PATIENT MESSAGE (OUTPATIENT)
Dept: HEALTH INFORMATION MANAGEMENT | Facility: OTHER | Age: 30
End: 2020-04-24

## 2020-04-24 LAB
ALBUMIN SERPL BCP-MCNC: 3.7 G/DL (ref 3.2–4.9)
ALBUMIN/GLOB SERPL: 2.3 G/DL
ALP SERPL-CCNC: 44 U/L (ref 30–99)
ALT SERPL-CCNC: 53 U/L (ref 2–50)
ANION GAP SERPL CALC-SCNC: 17 MMOL/L (ref 7–16)
AST SERPL-CCNC: 13 U/L (ref 12–45)
BILIRUB SERPL-MCNC: 0.2 MG/DL (ref 0.1–1.5)
BUN SERPL-MCNC: 18 MG/DL (ref 8–22)
CALCIUM SERPL-MCNC: 8.6 MG/DL (ref 8.5–10.5)
CHLORIDE SERPL-SCNC: 105 MMOL/L (ref 96–112)
CO2 SERPL-SCNC: 16 MMOL/L (ref 20–33)
CREAT SERPL-MCNC: 0.58 MG/DL (ref 0.5–1.4)
GLOBULIN SER CALC-MCNC: 1.6 G/DL (ref 1.9–3.5)
GLUCOSE SERPL-MCNC: 183 MG/DL (ref 65–99)
POTASSIUM SERPL-SCNC: 3.6 MMOL/L (ref 3.6–5.5)
PROT SERPL-MCNC: 5.3 G/DL (ref 6–8.2)
SODIUM SERPL-SCNC: 138 MMOL/L (ref 135–145)
TEST NAME 95000: NORMAL

## 2020-04-24 PROCEDURE — 80053 COMPREHEN METABOLIC PANEL: CPT

## 2020-04-24 PROCEDURE — A9270 NON-COVERED ITEM OR SERVICE: HCPCS | Performed by: STUDENT IN AN ORGANIZED HEALTH CARE EDUCATION/TRAINING PROGRAM

## 2020-04-24 PROCEDURE — 36415 COLL VENOUS BLD VENIPUNCTURE: CPT

## 2020-04-24 PROCEDURE — 700102 HCHG RX REV CODE 250 W/ 637 OVERRIDE(OP): Performed by: STUDENT IN AN ORGANIZED HEALTH CARE EDUCATION/TRAINING PROGRAM

## 2020-04-24 PROCEDURE — 770006 HCHG ROOM/CARE - MED/SURG/GYN SEMI*

## 2020-04-24 PROCEDURE — 700105 HCHG RX REV CODE 258: Performed by: STUDENT IN AN ORGANIZED HEALTH CARE EDUCATION/TRAINING PROGRAM

## 2020-04-24 PROCEDURE — 700111 HCHG RX REV CODE 636 W/ 250 OVERRIDE (IP): Performed by: STUDENT IN AN ORGANIZED HEALTH CARE EDUCATION/TRAINING PROGRAM

## 2020-04-24 PROCEDURE — 99233 SBSQ HOSP IP/OBS HIGH 50: CPT | Mod: GC | Performed by: INTERNAL MEDICINE

## 2020-04-24 RX ORDER — PREDNISONE 20 MG/1
20 TABLET ORAL DAILY
Status: DISCONTINUED | OUTPATIENT
Start: 2020-04-25 | End: 2020-04-25 | Stop reason: HOSPADM

## 2020-04-24 RX ADMIN — GLYCOPYRROLATE 1 CAPSULE: 15.6 CAPSULE RESPIRATORY (INHALATION) at 08:34

## 2020-04-24 RX ADMIN — NYSTATIN: 100000 POWDER TOPICAL at 04:37

## 2020-04-24 RX ADMIN — ACETAMINOPHEN 1000 MG: 500 TABLET ORAL at 17:43

## 2020-04-24 RX ADMIN — ACETAMINOPHEN 1000 MG: 500 TABLET ORAL at 12:58

## 2020-04-24 RX ADMIN — ASPIRIN 81 MG: 81 TABLET, COATED ORAL at 04:37

## 2020-04-24 RX ADMIN — TRAZODONE HYDROCHLORIDE 50 MG: 100 TABLET ORAL at 17:43

## 2020-04-24 RX ADMIN — NYSTATIN: 100000 POWDER TOPICAL at 17:48

## 2020-04-24 RX ADMIN — MICONAZOLE NITRATE: 20 CREAM TOPICAL at 17:48

## 2020-04-24 RX ADMIN — OXYCODONE HYDROCHLORIDE 5 MG: 5 TABLET ORAL at 14:39

## 2020-04-24 RX ADMIN — IVABRADINE 7.5 MG: 5 TABLET, FILM COATED ORAL at 17:44

## 2020-04-24 RX ADMIN — ACETAZOLAMIDE 500 MG: 250 TABLET ORAL at 04:36

## 2020-04-24 RX ADMIN — SODIUM CHLORIDE 250 MG: 9 INJECTION, SOLUTION INTRAVENOUS at 17:42

## 2020-04-24 RX ADMIN — OXYCODONE HYDROCHLORIDE 5 MG: 5 TABLET ORAL at 08:33

## 2020-04-24 RX ADMIN — MYCOPHENOLATE MOFETIL 1000 MG: 250 CAPSULE ORAL at 20:16

## 2020-04-24 RX ADMIN — FLUOXETINE HYDROCHLORIDE 40 MG: 20 CAPSULE ORAL at 04:36

## 2020-04-24 RX ADMIN — OXCARBAZEPINE 150 MG: 150 TABLET, FILM COATED ORAL at 04:37

## 2020-04-24 RX ADMIN — MELATONIN 1000 UNITS: at 04:36

## 2020-04-24 RX ADMIN — MICONAZOLE NITRATE: 20 CREAM TOPICAL at 04:38

## 2020-04-24 RX ADMIN — IVABRADINE 7.5 MG: 5 TABLET, FILM COATED ORAL at 08:33

## 2020-04-24 RX ADMIN — ONDANSETRON HYDROCHLORIDE 4 MG: 2 INJECTION INTRAMUSCULAR; INTRAVENOUS at 10:36

## 2020-04-24 RX ADMIN — POTASSIUM BICARBONATE 25 MEQ: 978 TABLET, EFFERVESCENT ORAL at 06:25

## 2020-04-24 RX ADMIN — OXYCODONE HYDROCHLORIDE 5 MG: 5 TABLET ORAL at 20:15

## 2020-04-24 RX ADMIN — ONDANSETRON HYDROCHLORIDE 4 MG: 2 INJECTION INTRAMUSCULAR; INTRAVENOUS at 01:05

## 2020-04-24 RX ADMIN — BUSPIRONE HYDROCHLORIDE 10 MG: 10 TABLET ORAL at 04:37

## 2020-04-24 RX ADMIN — ZIPRASIDONE HYDROCHLORIDE 40 MG: 40 CAPSULE ORAL at 04:37

## 2020-04-24 RX ADMIN — ACETAMINOPHEN 1000 MG: 500 TABLET ORAL at 04:36

## 2020-04-24 RX ADMIN — PREGABALIN 300 MG: 150 CAPSULE ORAL at 06:00

## 2020-04-24 RX ADMIN — BUDESONIDE AND FORMOTEROL FUMARATE DIHYDRATE 2 PUFF: 160; 4.5 AEROSOL RESPIRATORY (INHALATION) at 17:48

## 2020-04-24 RX ADMIN — ONDANSETRON HYDROCHLORIDE 4 MG: 2 INJECTION INTRAMUSCULAR; INTRAVENOUS at 21:17

## 2020-04-24 RX ADMIN — ACETAZOLAMIDE 500 MG: 250 TABLET ORAL at 17:44

## 2020-04-24 RX ADMIN — MODAFINIL 200 MG: 100 TABLET ORAL at 04:37

## 2020-04-24 RX ADMIN — BUSPIRONE HYDROCHLORIDE 10 MG: 10 TABLET ORAL at 17:43

## 2020-04-24 RX ADMIN — PREGABALIN 300 MG: 150 CAPSULE ORAL at 21:12

## 2020-04-24 RX ADMIN — SODIUM CHLORIDE 250 MG: 9 INJECTION, SOLUTION INTRAVENOUS at 04:45

## 2020-04-24 RX ADMIN — SODIUM CHLORIDE 250 MG: 9 INJECTION, SOLUTION INTRAVENOUS at 10:40

## 2020-04-24 RX ADMIN — MYCOPHENOLATE MOFETIL 1000 MG: 250 CAPSULE ORAL at 10:39

## 2020-04-24 RX ADMIN — SENNOSIDES AND DOCUSATE SODIUM 2 TABLET: 8.6; 5 TABLET ORAL at 04:36

## 2020-04-24 RX ADMIN — BUDESONIDE AND FORMOTEROL FUMARATE DIHYDRATE 2 PUFF: 160; 4.5 AEROSOL RESPIRATORY (INHALATION) at 04:37

## 2020-04-24 RX ADMIN — LEVOTHYROXINE SODIUM 100 MCG: 100 TABLET ORAL at 04:37

## 2020-04-24 RX ADMIN — OXCARBAZEPINE 150 MG: 150 TABLET, FILM COATED ORAL at 17:45

## 2020-04-24 RX ADMIN — ENOXAPARIN SODIUM 40 MG: 100 INJECTION SUBCUTANEOUS at 04:37

## 2020-04-24 ASSESSMENT — ENCOUNTER SYMPTOMS
ORTHOPNEA: 0
FEVER: 0
PHOTOPHOBIA: 1
EYE PAIN: 1
DOUBLE VISION: 0
HEMOPTYSIS: 0
ABDOMINAL PAIN: 0
DEPRESSION: 0
SENSORY CHANGE: 1
SORE THROAT: 0
VOMITING: 0
SPEECH CHANGE: 0
NECK PAIN: 0
DIAPHORESIS: 0
HEADACHES: 0
PALPITATIONS: 0
DIZZINESS: 0
BRUISES/BLEEDS EASILY: 0
CHILLS: 0
NERVOUS/ANXIOUS: 0
HEARTBURN: 0
NAUSEA: 0
MYALGIAS: 0
BLURRED VISION: 0
SINUS PAIN: 0
EYE REDNESS: 0
SPUTUM PRODUCTION: 0
FOCAL WEAKNESS: 0
COUGH: 0

## 2020-04-24 NOTE — PROGRESS NOTES
Daily Progress Note:     Date of Service: 4/24/2020  Primary Team: [facility] IM Red Team    Attending: Liu Schafer M.D.   Senior Resident: Dr. Giles  Intern: Dr. Sanchez  Contact:  [phone]    Chief Complaint:   Chronic Relapsing Inflammatory Optic Neuropathy.    Subjective:   No acute events overnight reported, unchanged mild/moderate left eye pain.  PRN med working for pain.  This morning during rounds patient stated pain better controlled.  Patient tolerating well oral intake, self transfer to wheelchair.  Vital signs stable, afebrile.  Day 3 receiving Methylprednisolone.  Patient will follow up with Neuro/Ophthalmo next week.  Also scheduled appointment for PCP and neurologist requested.    Consultants/Specialty:  Neurology  Neuro/opthalmology     Review of Systems:      Review of Systems   Constitutional: Negative for chills, diaphoresis, fever and malaise/fatigue.   HENT: Negative for hearing loss, nosebleeds, sinus pain, sore throat and tinnitus.    Eyes: Positive for photophobia and pain. Negative for blurred vision, double vision and redness.   Respiratory: Negative for cough, hemoptysis and sputum production.    Cardiovascular: Negative for chest pain, palpitations, orthopnea and leg swelling.   Gastrointestinal: Negative for abdominal pain, heartburn, nausea and vomiting.   Genitourinary: Negative for dysuria, frequency, hematuria and urgency.   Musculoskeletal: Negative for joint pain, myalgias and neck pain.   Skin: Negative for itching and rash.   Neurological: Positive for sensory change. Negative for dizziness, speech change, focal weakness and headaches.   Endo/Heme/Allergies: Negative for environmental allergies. Does not bruise/bleed easily.   Psychiatric/Behavioral: Negative for depression and suicidal ideas. The patient is not nervous/anxious.        Objective Data:   Physical Exam:   Vitals:   Temp:  [36.2 °C (97.1 °F)-37.1 °C (98.8 °F)] 37.1 °C (98.8 °F)  Pulse:  [64-89] 89  Resp:  [16-19]  18  BP: ()/(50-76) 130/76  SpO2:  [95 %-96 %] 95 %     Physical Exam  Constitutional:       General: She is not in acute distress.     Appearance: She is obese. She is not ill-appearing or toxic-appearing.   HENT:      Head: Normocephalic and atraumatic.      Nose: Nose normal. No congestion or rhinorrhea.      Mouth/Throat:      Mouth: Mucous membranes are moist.      Pharynx: Oropharynx is clear. No oropharyngeal exudate.   Eyes:      General: No scleral icterus.     Extraocular Movements: Extraocular movements intact.      Conjunctiva/sclera: Conjunctivae normal.      Pupils: Pupils are equal, round, and reactive to light.   Neck:      Musculoskeletal: Normal range of motion and neck supple. No neck rigidity or muscular tenderness.   Cardiovascular:      Rate and Rhythm: Normal rate and regular rhythm.      Pulses: Normal pulses.      Heart sounds: No murmur. No gallop.    Pulmonary:      Effort: Pulmonary effort is normal. No respiratory distress.      Breath sounds: Normal breath sounds. No wheezing, rhonchi or rales.   Abdominal:      General: Bowel sounds are normal. There is no distension.      Palpations: Abdomen is soft.      Tenderness: There is no abdominal tenderness. There is no guarding or rebound.   Musculoskeletal: Normal range of motion.         General: No swelling, tenderness or deformity.      Right lower leg: No edema.      Left lower leg: No edema.   Skin:     General: Skin is warm.      Capillary Refill: Capillary refill takes less than 2 seconds.      Coloration: Skin is not jaundiced.      Findings: No bruising.   Neurological:      General: No focal deficit present.      Mental Status: She is alert and oriented to person, place, and time.      Cranial Nerves: No cranial nerve deficit.      Sensory: No sensory deficit.   Psychiatric:         Mood and Affect: Mood normal.         Behavior: Behavior normal.         Thought Content: Thought content normal.           Labs:   Review:  Bicarb better    Imaging:   Review    * Optic neuritis- (present on admission)  Assessment & Plan  - Chronic relapsing inflammatory optic neuropathy (CRION) with multiple relapses over the past several months  - The patient presented today with painful, blurry left eye, the pain is worse with eye movements.  - Recurrent admissions to the hospital for similar complaints, She is on daily dose of prednisone 5 mg.  - MRI brain couldn't be done because of her spinal stimulator.  - CT head orderd and didn't show acute intracranial abnormalities.  - Patient had an appointment with Dr Yousif, but unable to attend due to her acute pain.  - CT Orbits 4/21 unremarkable  - Neurology ordered Anti MOG, Aquaporin 4 ab  - Patient will see Dr Yousif today 4/22  - 4/22 patient states improvement, pain is now 7/10  - Patient out of bed in wheelchair, able to transfer on her own.  - 4/23 Patient still with mild/moderate pain.  - 4/24 PRN meds working for pain. Pain better controlled today.          PLAN:  - Methylprednisolone 250 mg Q6 hrs x 4 days per Dr Yousif  - Patient will finished IV Steroids tonight  - DC home tomorrow morning on Oral prednisone  - Oral prednisone 20 mg start on day 5th  - Neurology on board  - Neurochecks Q4 hrs  - CTM  - Pain control        Intracranial hypertension  Assessment & Plan  - Recurrent idiopathic intracranial hypertension unassociated with papilledema  - Was previously treated with lumbar puncture, still on diamox  - No headaches this admission  - CT head unremarkable 4/20/2020  - CT Orbits Unremarkable 4/22/2020  - Labs: Low Bicarb, likely secondary to Acetazolamide               Bicarb mild fluctuations  - 4/24 Diamox 500mg BID  - 4/24 Bicarb slightly better 16 from 13 yesterday      TONYA (obstructive sleep apnea)- (present on admission)  Assessment & Plan  - Not on CPAP at home.  - CTM  -    DM (diabetes mellitus) ([facility])- (present on admission)  Assessment & Plan  - Diabetes type 2  - Most  recent Hemoglobin A1c is 6 on 8/29/2019  - On Trulicity at home  - Will hold Trulicity  while inpatient  - Glucose monitoring  - Blood glucose below 140-180's inpatient  - Diabetic diet  - CTM    H/O prior ablation treatment- (present on admission)  Assessment & Plan  - Patient is a previous history of SVT s/p ablation  - On Ivabradine  - Denied palpitations, SOB, chest pain.      Intertriginous candidiasis  Assessment & Plan  Bilateral redness under arms bilaterally  Nystatin powder to be applied.    Restrictive lung disease  Assessment & Plan  - Pulmonary function tests are consistent with mild restrictive  process and may be secondary to obesity.  - PFTs were done on 9/5/2019 and showed  mild reduction in FEV1 at 79% of   predicted and FVC at 76% of predicted with an FEV1/FVC ratio of   88, no significant bronchodilator response, low lung volumes, with Total lung capacity of 83%, DLCO is low normal at 85% of predicted.  On home inhalers, will resume while inpatient.    Debility- (present on admission)  Assessment & Plan  - The patient reported that she is bed bound since December 2019  - Likely just deconditioning   - Multifactorial in nature, multiple co morbidities including morbid obesity and psychiatric problems   - 4/22 PT/OT No needs from them. Patient able to transfer, stand up, but not able to walk.  - No changes 4/23    Hypothyroidism- (present on admission)  Assessment & Plan  - TSH 0.612  - On Levothyroxine 100 mcg, the dose was increased on 3/3/2020  - Continue levothyroxine      Peripheral neuropathy and chornic pain syndrome (CMS-HCC)- (present on admission)  Assessment & Plan  - It could be secondary to High dose Acetazolamide   - Continue home lyrica & gabapentin.

## 2020-04-24 NOTE — CARE PLAN
Problem: Communication  Goal: The ability to communicate needs accurately and effectively will improve  Outcome: PROGRESSING AS EXPECTED     Problem: Safety  Goal: Will remain free from injury  Outcome: PROGRESSING AS EXPECTED  Goal: Will remain free from falls  Outcome: PROGRESSING AS EXPECTED     Problem: Pain Management  Goal: Pain level will decrease to patient's comfort goal  Outcome: PROGRESSING AS EXPECTED     Problem: Skin Integrity  Goal: Risk for impaired skin integrity will decrease  Outcome: PROGRESSING AS EXPECTED

## 2020-04-24 NOTE — CONSULTS
DATE OF SERVICE:  04/22/2020    The patient has been screened for COVID-19.  Her temperature was 98.3 degrees.    Dr. Ramirez was wearing an N95 respirator.    Dear Ga,     HISTORY OF PRESENT ILLNESS:  The patient was evaluated at [facility] on an emergency basis on 04/22/2020.  The patient is 29 years of age   and was last seen at [facility] on 03/04/2020.  The patient is 29   years of age and has a history of clinically stable chronic relapsing   inflammatory optic neuropathy (CRION syndrome).  In addition, the patient also   has idiopathic intracranial hypertension, unassociated with papilledema.  The   patient reports that her headaches have slightly improved following treatment   with Diamox 1500 mg p.o. daily.    The patient reports that her grandmother who distributes her medication   decreased the patient's maintenance dose of prednisone from 20 mg p.o. daily   to 5 mg p.o. daily.  2-3 days after decreasing the prednisone dosage, the   patient began having intense recurrent orbital pain OS, graded at 13 on a   scale of 1-10.  The patient notified the office on 04/21/2020 and was elected   to see the patient on emergency basis on 04/22/2020;however, the patient   elected to be seen at the emergency room.    Subsequently, the patient was admitted to [facility] and was   subsequently treated with IV Solu-Medrol 1000 mg daily x4 doses.  The patient   has received 2 doses of Solu-Medrol and reports that the orbital pain has   decreased from a grade 13 on a scale of 0-10 to a grade 7 on a scale of 0-10.    The patient admits to mild blurred vision, OS, which has improved following   treatment with steroids.    The patient denies high-pressure headaches, subjective bruit, diplopia, and   transient visual obscurations.    MEDICATIONS:  Diamox 750 mg p.o. b.i.d.  The patient has received 2 doses of   IV Solu-Medrol 1000 mg daily x2 days.  In addition, the patient is also  taking   CellCept 1000 mg p.o. b.i.d., albuterol, and aspirin.    ALLERGIES:  CEFDINIR, DEPAKOTE, ABILIFY, AMITRIPTYLINE, AMOXICILLIN,   CIPROFLOXACIN, CLINDAMYCIN, DOXYCYCLINE, ERYTHROMYCIN, FLAGYL, FLOMAX,   METFORMIN, SULFA MEDICATIONS, TAPE, VANCOMYCIN, KEFLEX, LEVOFLOXACIN,   LEVAQUIN.    PAST MEDICAL HISTORY:  Type 2 diabetes mellitus, Hashimoto's thyroiditis, and   CRION disease.    PAST SURGICAL HISTORY:  Status post placement of an InterStim pacemaker   (implanted unilateral leads stimulating the S3 nerve root).    PAST FAMILY HISTORY:  Mother with a history of hypertension.    SOCIAL HISTORY:  The patient does not smoke nor drink.    REVIEW OF SYSTEMS:  The patient has a history of cardiac arrhythmia, chest   pain, tinnitus, arthritis, easy fatigability, shortness of breath, headache,   thyroid disease, and diabetes mellitus.    BEST CORRECTED VISUAL ACUITY:  OD 20/20, OS 20/20.    NEAR VISUAL ACUITY:  OD J1 and OS J1 plus.    COLOR VISION:  Correctly recognizing 6 of 6 HRR plates OD and 5 of 6 HRR   plates OS.    AMSLER GRID TESTING:  The Amsler grid markings appeared blurred OD.  OS   normal.    CONFRONTATION VISUAL FIELDS:  OD normal, OS normal.    PUPILS:  Both pupils measured 3.0 mm.  Both pupils were briskly reactive to   light.    FLICKER FUSION:  OD 31 Hz, OS 29 Hz.    SLIT LAMP EXAMINATION:  There was a trace nuclear sclerosis OD and trace   nuclear sclerosis OS.    TENSION:  Taken by Dr. Yousif OD 21 mm, OS 19 mm.    MOTILITY EXAMINATION:  There was evidence of a small-angled left hypertropia.    There was 1+ gaze-evoked nystagmus on conjugate left gaze.  Nystagmus was of   high amplitude and low frequency.    MOTILITY MEASUREMENTS:  PRIMARY GAZE:  2 prism diopters of left hypertropia and 2 prism diopters   esotropia.  RIGHT GAZE:  3 prism diopters of esotropia.  LEFT GAZE:  3 prism diopters of esotropia.  UPGAZE:  2 prism diopters of esotropia.  DOWNGAZE:  2 prism diopters of esotropia.  NEAR:   2 prism diopters of esotropia.    DILATED OPHTHALMOSCOPY:  Cup-to-disk ratio measured 0.5.  The right optic   nerve, macula, and peripheral retina appeared normal.  There was a superior   peripapillary nevus.    Cup-to-disk ratio measured 0.4.  The left optic nerve, macula, and peripheral   retina appeared normal.    [place] visual field:  24-2 OD.  The foveal threshold was reduced at 32 dB.    The visual field showed minimal central defects otherwise normal.  Rest of the   foveal threshold was 41 dB.  There was a rare central defect, otherwise   normal.    OCT:  The mean retinal nerve fiber layer thickness was normal OD at 94 microns   and OS normal at 95 microns.  The macular thickness map was normal OU.    FUNDUS PHOTOGRAPHY:  Will be performed at the next visit.    MENTAL STATUS:  Her speech was fluent.  Insight, comprehension, and judgment   were normal.    CRANIAL NERVES:  Corneal sensation and facial sensation were intact.  There   was symmetric eyelid closure and facial grimace.  Lower cranial nerves were   normal.    MOTOR EXAMINATION:  There was mild weakness of both lower extremities.    CT SCAN:  A CT scan of the orbit with and without contrast was performed on   04/21/2020.  CT scan of the orbits with and without contrast was normal.    LABORATORY STUDIES:  The NMO and anti-MOG antibodies were pending.    ASSESSMENT:  1.  Clinically stable, recurrent, chronic relapsing inflammatory optic   neuropathy (CRION) syndrome.  2.  Recurrent acute retrobulbar optic neuritis, OS.  3.  Remote retrobulbar optic neuritis, OD.  4.  Recurrent optic neuritis OD, 09/08/2018.  5.  Clinically stable idiopathic intracranial hypertension associated with   papilledema.  Importantly, the patient has remained stable on the higher dose   of Diamox 1500 mg p.o. daily.  6.  Glaucoma suspect.  7.  Small-angle esotropia.  8.  Remote history of paroxysmal atrial fibrillation.  9.  Type 2 diabetes mellitus.  10.  Undifferentiated  autoimmune disease.  11.  Obesity  12.  Status post lumbar laminectomy at the L4-L5 level, 2012  13.  History of bowel and bladder incontinence.  14.  Status post placement of an InterStim pacemaker.  15.  Dysmorphic facies.  16.  Right common peroneal nerve palsy.  17.  Peripapillary nevus, OD and OS.  18.  Nonsmoker.    RECOMMENDATIONS:  1.  Maintain the IV Solu-Medrol 1000 mg daily for 4 days.  2.  Following the IV infusion of Solu-Medrol, the patient will begin taking   prednisone, her maintenance dose of prednisone 20 mg p.o. daily.  3.  Maintain the CellCept 1000 mg p.o. b.i.d.  4.  Maintain the Diamox 750 mg p.o. b.i.d.  5.  Follow up neurophthalmology clinic in 1 week.  6.  Obtain a copy of the laboratory studies when they become available.  7.  Risks, benefits, alternatives, and expectations of above-mentioned therapy   have been discussed at length with the patient.  The patient agrees to the   above-mentioned workup and treatment strategies.       ____________________________________     MARITZA GROVER DO    [facility] / NTS    DD:  04/23/2020 20:11:42  DT:  04/23/2020 22:35:22    D#:  [id number]  Job#:  [id number]

## 2020-04-25 VITALS
SYSTOLIC BLOOD PRESSURE: 137 MMHG | HEIGHT: 65 IN | OXYGEN SATURATION: 96 % | TEMPERATURE: 97 F | WEIGHT: 255.07 LBS | RESPIRATION RATE: 16 BRPM | BODY MASS INDEX: 42.5 KG/M2 | DIASTOLIC BLOOD PRESSURE: 88 MMHG | HEART RATE: 82 BPM

## 2020-04-25 LAB
ALBUMIN SERPL BCP-MCNC: 3.6 G/DL (ref 3.2–4.9)
ALBUMIN/GLOB SERPL: 2.4 G/DL
ALP SERPL-CCNC: 39 U/L (ref 30–99)
ALT SERPL-CCNC: 51 U/L (ref 2–50)
ANION GAP SERPL CALC-SCNC: 13 MMOL/L (ref 7–16)
AST SERPL-CCNC: 11 U/L (ref 12–45)
BACTERIA BLD CULT: NORMAL
BACTERIA BLD CULT: NORMAL
BILIRUB SERPL-MCNC: 0.3 MG/DL (ref 0.1–1.5)
BUN SERPL-MCNC: 18 MG/DL (ref 8–22)
CALCIUM SERPL-MCNC: 8.7 MG/DL (ref 8.5–10.5)
CHLORIDE SERPL-SCNC: 105 MMOL/L (ref 96–112)
CO2 SERPL-SCNC: 18 MMOL/L (ref 20–33)
CREAT SERPL-MCNC: 0.62 MG/DL (ref 0.5–1.4)
GLOBULIN SER CALC-MCNC: 1.5 G/DL (ref 1.9–3.5)
GLUCOSE SERPL-MCNC: 126 MG/DL (ref 65–99)
POTASSIUM SERPL-SCNC: 3.5 MMOL/L (ref 3.6–5.5)
PROT SERPL-MCNC: 5.1 G/DL (ref 6–8.2)
SIGNIFICANT IND 70042: NORMAL
SIGNIFICANT IND 70042: NORMAL
SITE SITE: NORMAL
SITE SITE: NORMAL
SODIUM SERPL-SCNC: 136 MMOL/L (ref 135–145)
SOURCE SOURCE: NORMAL
SOURCE SOURCE: NORMAL

## 2020-04-25 PROCEDURE — 700111 HCHG RX REV CODE 636 W/ 250 OVERRIDE (IP): Performed by: STUDENT IN AN ORGANIZED HEALTH CARE EDUCATION/TRAINING PROGRAM

## 2020-04-25 PROCEDURE — 700102 HCHG RX REV CODE 250 W/ 637 OVERRIDE(OP): Performed by: STUDENT IN AN ORGANIZED HEALTH CARE EDUCATION/TRAINING PROGRAM

## 2020-04-25 PROCEDURE — 99238 HOSP IP/OBS DSCHRG MGMT 30/<: CPT | Mod: GC | Performed by: INTERNAL MEDICINE

## 2020-04-25 PROCEDURE — A9270 NON-COVERED ITEM OR SERVICE: HCPCS | Performed by: STUDENT IN AN ORGANIZED HEALTH CARE EDUCATION/TRAINING PROGRAM

## 2020-04-25 PROCEDURE — 36415 COLL VENOUS BLD VENIPUNCTURE: CPT

## 2020-04-25 PROCEDURE — 80053 COMPREHEN METABOLIC PANEL: CPT

## 2020-04-25 RX ORDER — ACETAZOLAMIDE 250 MG/1
500 TABLET ORAL 2 TIMES DAILY
Qty: 120 TAB | Refills: 0 | Status: ON HOLD
Start: 2020-04-25 | End: 2020-05-19

## 2020-04-25 RX ORDER — OXYCODONE HYDROCHLORIDE 5 MG/1
5 TABLET ORAL EVERY 6 HOURS PRN
Qty: 10 TAB | Refills: 0 | Status: SHIPPED | OUTPATIENT
Start: 2020-04-25 | End: 2020-04-28

## 2020-04-25 RX ORDER — PREDNISONE 20 MG/1
20 TABLET ORAL DAILY
Qty: 30 TAB | Refills: 0 | Status: ON HOLD | OUTPATIENT
Start: 2020-04-25 | End: 2020-05-21

## 2020-04-25 RX ADMIN — MICONAZOLE NITRATE: 20 CREAM TOPICAL at 06:09

## 2020-04-25 RX ADMIN — ENOXAPARIN SODIUM 40 MG: 100 INJECTION SUBCUTANEOUS at 06:07

## 2020-04-25 RX ADMIN — ONDANSETRON HYDROCHLORIDE 4 MG: 2 INJECTION INTRAMUSCULAR; INTRAVENOUS at 09:33

## 2020-04-25 RX ADMIN — ZIPRASIDONE HYDROCHLORIDE 40 MG: 40 CAPSULE ORAL at 06:08

## 2020-04-25 RX ADMIN — FLUOXETINE HYDROCHLORIDE 40 MG: 20 CAPSULE ORAL at 06:07

## 2020-04-25 RX ADMIN — SENNOSIDES AND DOCUSATE SODIUM 2 TABLET: 8.6; 5 TABLET ORAL at 06:07

## 2020-04-25 RX ADMIN — GLYCOPYRROLATE 1 CAPSULE: 15.6 CAPSULE RESPIRATORY (INHALATION) at 07:41

## 2020-04-25 RX ADMIN — ACETAMINOPHEN 1000 MG: 500 TABLET ORAL at 11:52

## 2020-04-25 RX ADMIN — POTASSIUM BICARBONATE 50 MEQ: 978 TABLET, EFFERVESCENT ORAL at 07:40

## 2020-04-25 RX ADMIN — NYSTATIN: 100000 POWDER TOPICAL at 06:08

## 2020-04-25 RX ADMIN — MYCOPHENOLATE MOFETIL 1000 MG: 250 CAPSULE ORAL at 07:43

## 2020-04-25 RX ADMIN — PREDNISONE 20 MG: 20 TABLET ORAL at 06:07

## 2020-04-25 RX ADMIN — OXYCODONE HYDROCHLORIDE 5 MG: 5 TABLET ORAL at 09:33

## 2020-04-25 RX ADMIN — ASPIRIN 81 MG: 81 TABLET, COATED ORAL at 06:07

## 2020-04-25 RX ADMIN — BUDESONIDE AND FORMOTEROL FUMARATE DIHYDRATE 2 PUFF: 160; 4.5 AEROSOL RESPIRATORY (INHALATION) at 06:08

## 2020-04-25 RX ADMIN — ACETAMINOPHEN 1000 MG: 500 TABLET ORAL at 06:07

## 2020-04-25 RX ADMIN — ACETAZOLAMIDE 500 MG: 250 TABLET ORAL at 06:08

## 2020-04-25 RX ADMIN — MELATONIN 1000 UNITS: at 06:07

## 2020-04-25 RX ADMIN — BUSPIRONE HYDROCHLORIDE 10 MG: 10 TABLET ORAL at 06:07

## 2020-04-25 RX ADMIN — MODAFINIL 200 MG: 100 TABLET ORAL at 06:07

## 2020-04-25 RX ADMIN — LEVOTHYROXINE SODIUM 100 MCG: 100 TABLET ORAL at 06:07

## 2020-04-25 RX ADMIN — PREGABALIN 300 MG: 150 CAPSULE ORAL at 07:41

## 2020-04-25 RX ADMIN — OXCARBAZEPINE 150 MG: 150 TABLET, FILM COATED ORAL at 06:08

## 2020-04-25 RX ADMIN — IVABRADINE 7.5 MG: 5 TABLET, FILM COATED ORAL at 07:41

## 2020-04-25 ASSESSMENT — ENCOUNTER SYMPTOMS
SPUTUM PRODUCTION: 0
EYE REDNESS: 0
HEADACHES: 0
PALPITATIONS: 0
ABDOMINAL PAIN: 0
SINUS PAIN: 0
DEPRESSION: 0
NECK PAIN: 0
DIAPHORESIS: 0
SPEECH CHANGE: 0
SENSORY CHANGE: 1
DIZZINESS: 0
DOUBLE VISION: 0
BRUISES/BLEEDS EASILY: 0
VOMITING: 0
EYE PAIN: 1
HEMOPTYSIS: 0
PHOTOPHOBIA: 1
NERVOUS/ANXIOUS: 0
NAUSEA: 0
FEVER: 0
MYALGIAS: 0
HEARTBURN: 0
CHILLS: 0
BLURRED VISION: 0
COUGH: 0
SORE THROAT: 0
ORTHOPNEA: 0
FOCAL WEAKNESS: 0

## 2020-04-25 NOTE — DISCHARGE INSTRUCTIONS
Discharge Instructions    Discharged to home by car with relative. Discharged via wheelchair, hospital escort: Yes.  Special equipment needed: Wheelchair    Be sure to schedule a follow-up appointment with your primary care doctor or any specialists as instructed.     Discharge Plan:        I understand that a diet low in cholesterol, fat, and sodium is recommended for good health. Unless I have been given specific instructions below for another diet, I accept this instruction as my diet prescription.   Other diet: Diabetic    Special Instructions: None    · Is patient discharged on Warfarin / Coumadin?   No     Depression / Suicide Risk    As you are discharged from this [facility] facility, it is important to learn how to keep safe from harming yourself.    Recognize the warning signs:  · Abrupt changes in personality, positive or negative- including increase in energy   · Giving away possessions  · Change in eating patterns- significant weight changes-  positive or negative  · Change in sleeping patterns- unable to sleep or sleeping all the time   · Unwillingness or inability to communicate  · Depression  · Unusual sadness, discouragement and loneliness  · Talk of wanting to die  · Neglect of personal appearance   · Rebelliousness- reckless behavior  · Withdrawal from people/activities they love  · Confusion- inability to concentrate     If you or a loved one observes any of these behaviors or has concerns about self-harm, here's what you can do:  · Talk about it- your feelings and reasons for harming yourself  · Remove any means that you might use to hurt yourself (examples: pills, rope, extension cords, firearm)  · Get professional help from the community (Mental Health, Substance Abuse, psychological counseling)  · Do not be alone:Call your Safe Contact- someone whom you trust who will be there for you.  · Call your local CRISIS HOTLINE 054-7496 or 533-350-1533  · Call your local Children's Mobile Crisis  "Response Team [facility] (521) 197-8876 or www.[employer].BeanStockd  · Call the toll free National Suicide Prevention Hotlines   · National Suicide Prevention Lifeline 910-940-YBQE (9053)  · [employer] Hope Line Network 800-SUICIDE (440-0623)  Visual Disturbances  You have had a disturbance in your vision. This may be caused by various conditions, such as:  · Migraines. Migraine headaches are often preceded by a disturbance in vision. Blind spots or light flashes are followed by a headache. This type of visual disturbance is temporary. It does not damage the eye.  · Glaucoma. This is caused by increased pressure in the eye. Symptoms include haziness, blurred vision, or seeing rainbow colored circles when looking at bright lights. Partial or complete visual loss can occur. You may or may not experience eye pain. Visual loss may be gradual or sudden and is irreversible. Glaucoma is the leading cause of blindness.  · Retina problems. Vision will be reduced if the retina becomes detached or if there is a circulation problem as with diabetes, high blood pressure, or a mini-stroke. Symptoms include seeing \"floaters,\" flashes of light, or shadows, as if a curtain has fallen over your eye.  · Optic nerve problems. The main nerve in your eye can be damaged by redness, soreness, and swelling (inflammation), poor circulation, drugs, and toxins.  It is very important to have a complete exam done by a specialist to determine the exact cause of your eye problem. The specialist may recommend medicines or surgery, depending on the cause of the problem. This can help prevent further loss of vision or reduce the risk of having a stroke. Contact the caregiver to whom you have been referred and arrange for follow-up care right away.  SEEK IMMEDIATE MEDICAL CARE IF:   · Your vision gets worse.  · You develop severe headaches.  · You have any weakness or numbness in the face, arms, or legs.  · You have any trouble speaking or walking.  This " information is not intended to replace advice given to you by your health care provider. Make sure you discuss any questions you have with your health care provider.  Document Released: 01/25/2006 Document Revised: 03/11/2013 Document Reviewed: 05/27/2015  [employer] Patient Education © 2017 [employer]

## 2020-04-25 NOTE — DISCHARGE SUMMARY
Discharge Summary    Date of Admission: 4/20/2020  Date of Discharge: 4/25/2020     Discharging Team:  [facility] Red team  Discharging Attending: Liu Schafer M.D.   Discharging Senior Resident: Dr. Giles  Discharging Intern: Dr. Sanchez      Chief Complaint:     Chief Complaint   Patient presents with   • Eye Pain     L side, history of neuromyelitis optica     Reason for Admission  Optic neuritis.    CODE STATUS  Full Code      HPI & HOSPITAL COURSE  Kristin Balderrama is a 29 y.o. female with complex PMH of Chronic relapsing inflammatory optic neuropathy (CRION),  hypothyroidism, DM2, TONYA, obesity, Asthma, chronic hip and back pain, SVT s/p ablation, hemiplegic migraine, intracranial hypertension, and multiple psychiatric issues who presented to the ED with left eye pain and swelling that started last night. The patient reported sharp pain 10/10 in severity around the left eye, radiates to the left side of the head, aggravated by eye movements, alleviated with morphine, associated with blurry/double vision, bilateral arms and legs numbness, sensitivity to light, and vomiting.  Of note, She had just completed a 5 days course of antibiotics and  prednisone 20 mg for bronchitis/ pneumonia, and started her usual 5 mg (but some confusion what dose she was supposed to have be on).     She was admitted for optic neuritis, and started on high-dose solumedrol (250 mg, q6h).  She was then able to be seen by neuro-ophthalmology (Dr. Yousif).  Who had us extend the high-dose steroid for 4 days, and plan for discharge on prednisone 20 mg PO.  He has scheduled the patient for follow-up in his office, the following week (Thursday, 4/30/20), at 9:30 (but arrive early, for paperwork).     She notes not moving much at home, and has difficulty walking, from lack of trying (in the past, and then became more of an effort).  Over the course of the hospitalization, she was able to work with PT and try to transfer to a wheelchair.   She notes she is able to get around well in a wheelchair, and has one at home.     She has completed the IV medication, and is scheduled for outpatient specialist follow-up.  Therefore, she is discharged in good/fair and stable condition to home with close outpatient follow-up.    The patient met 2-midnight criteria for an inpatient stay at the time of discharge.      DISCHARGE DIAGNOSES  Principal Problem:    Optic neuritis POA: Yes  Active Problems:    Intracranial hypertension POA: Unknown    H/O prior ablation treatment POA: Yes      Overview: Sinus node modification    DM (diabetes mellitus) (HCC) POA: Yes    TONYA (obstructive sleep apnea) POA: Yes    SVT (supraventricular tachycardia) (HCC) POA: Unknown    Intertriginous candidiasis POA: Unknown    Chronic lung disease POA: Unknown    History of supraventricular tachycardia POA: Unknown    Peripheral neuropathy and chornic pain syndrome (CMS-HCC) (Chronic) POA: Yes    Hypothyroidism (Chronic) POA: Yes    Debility POA: Yes    Restrictive lung disease POA: Unknown  Resolved Problems:    * No resolved hospital problems. *        FOLLOW UP ITEMS,  POST DISCHARGE  Please follow-up with your PCP, as previously scheduled.   Please follow-up with Neuro-Ophthalmology (Dr. Yousif), as we were able to arrange.      FOLLOW UP  Future Appointments   Date Time Provider Department Center   5/1/2020 11:00 AM Torres Brody M.D. [facility] Yousif D.O.  [address]  [facility] [address]  [phone]  Please arrive at 9:00 on Thursday (4/30/20),   to complete paperwork for appointment at 9:30.          MEDICATIONS ON DISCHARGE     Medication List      START taking these medications      Instructions   oxyCODONE immediate-release 5 MG Tabs  Commonly known as:  ROXICODONE   Take 1 Tab by mouth every 6 hours as needed for Severe Pain for up to 3 days.  Dose:  5 mg     predniSONE 20 MG Tabs  Commonly known as:  DELTASONE  Replaces:  predniSONE  5 MG Tbec   Take 1 Tab by mouth every day.  Dose:  20 mg        CHANGE how you take these medications      Instructions   acetaZOLAMIDE 250 MG Tabs  What changed:  how much to take  Commonly known as:  DIAMOX   Take 2 Tabs by mouth 2 times a day.  Dose:  500 mg        CONTINUE taking these medications      Instructions   albuterol 108 (90 Base) MCG/ACT Aers inhalation aerosol   Inhale 2 Puffs by mouth every 6 hours as needed for Shortness of Breath.  Dose:  2 Puff     aspirin EC 81 MG Tbec  Commonly known as:  ECOTRIN   Take 81 mg by mouth every day.  Dose:  81 mg     budesonide-formoterol 160-4.5 MCG/ACT Aero  Commonly known as:  SYMBICORT   Inhale 2 Puffs by mouth 2 Times a Day.  Dose:  2 Puff     busPIRone 10 MG Tabs tablet  Commonly known as:  BUSPAR   TAKE ONE TABLET BY MOUTH TWICE DAILY     CellCept 500 MG tablet  Generic drug:  mycophenolate   Take 1,000 mg by mouth 2 times a day.  Dose:  1,000 mg     Cholecalciferol 1000 UNIT Tabs  Commonly known as:  VITAMIND D3   Doctor's comments:  Follow PCP and Recheck  Take 1 Tab by mouth every day.  Dose:  1,000 Units     Corlanor 7.5 MG Tabs  Generic drug:  Ivabradine HCl   Take 7.5 mg by mouth 2 Times a Day. Needs to schedule follow up with Dr. Wilde  Dose:  7.5 mg     fluoxetine 40 MG capsule  Commonly known as:  PROZAC   TAKE ONE CAPSULE BY MOUTH ONCE A DAY     furosemide 80 MG Tabs  Commonly known as:  LASIX   Take 80 mg by mouth 1 time daily as needed.  Dose:  80 mg     ipratropium-albuterol 0.5-2.5 (3) MG/3ML nebulizer solution  Commonly known as:  DUONEB   3 mL by Nebulization route every 6 hours as needed for Shortness of Breath.  Dose:  3 mL     levothyroxine 100 MCG Tabs  Commonly known as:  SYNTHROID   Take 1 Tab by mouth Every morning on an empty stomach.  Dose:  100 mcg     Lyrica 300 MG capsule  Generic drug:  pregabalin   Take 300 mg by mouth 2 times a day.  Dose:  300 mg     OXcarbazepine 150 MG Tabs  Commonly known as:  TRILEPTAL   Take 1 Tab by  mouth 2 Times a Day.  Dose:  150 mg     tiotropium 18 MCG Caps  Commonly known as:  SPIRIVA   Inhale 1 Cap by mouth every day.  Dose:  18 mcg     ziprasidone 40 MG Caps  Commonly known as:  GEODON   TAKE ONE CAPSULE BY MOUTH TWICE DAILY        STOP taking these medications    predniSONE 5 MG Tbec  Replaced by:  predniSONE 20 MG Tabs        ASK your doctor about these medications      Instructions   Melatonin 10 MG Tabs   Take 10 mg by mouth every evening.  Dose:  10 mg     modafinil 200 MG Tabs  Commonly known as:  PROVIGIL   Take 1 Tab by mouth every morning for 60 days.  Dose:  200 mg     Nexplanon 68 MG Impl implant  Generic drug:  etonogestrel   Inject 1 Each as instructed Once.  Dose:  1 Each     ondansetron 4 MG Tbdp  Commonly known as:  ZOFRAN ODT  Ask about: Which instructions should I use?   Take 4 mg by mouth every 6 hours as needed for Nausea.  Dose:  4 mg     potassium chloride SA 20 MEQ Tbcr  Commonly known as:  Kdur   Take 20 mEq by mouth 2 times a day.  Dose:  20 mEq     promethazine 25 MG Supp  Commonly known as:  PHENERGAN   Insert 1 Suppository in rectum every 6 hours as needed for Nausea/Vomiting.  Dose:  25 mg     senna-docusate 8.6-50 MG Tabs  Commonly known as:  PERICOLACE or SENOKOT S  Ask about: Which instructions should I use?   Take 2 Tabs by mouth 2 times a day.  Dose:  2 Tab     traZODone 100 MG Tabs  Commonly known as:  DESYREL   Take 0.5 Tabs by mouth every evening.  Dose:  50 mg     Trulicity 1.5 MG/0.5ML Sopn  Generic drug:  Dulaglutide   Inject 1.5 mg as instructed every Friday.  Dose:  1.5 mg          (Of note,  reviewed, ORT score-4, consent reviewed, and to be signed/scanned.)  (Only temporary opiate medication, for pain control - for 2-3 days / 10 tabs.  No refills.)       Allergies  Allergies   Allergen Reactions   • Cefdinir Shortness of Breath and Itching     Tolerated 1/18/17  Tolerates ceftriaxone  Tolerated augmentin 8/2019    • Depakote [Divalproex Sodium] Unspecified  "    Muscle spasms/muscle pain and weakness     • Doxycycline Anaphylaxis and Vomiting     RXN=unknown   • Amitriptyline Unspecified     Headaches     • Aripiprazole [Abilify] Unspecified     Headaches/muscle twitching     • Clindamycin Nausea     Even with food     • Flagyl [Metronidazole Hcl] Unspecified     \"eye problems\"     • Flomax [Tamsulosin Hydrochloride] Swelling   • Levaquin Unspecified     Severe muscle cramps in legs  RXN=unknown   • Metformin Unspecified     Increased lactic acid      • Tape Rash     Tears skin off  coban with Tegaderm tape ok intermittently  RXN=ongoing   • Vancomycin Itching     Pt becomes flushed in face and chest.   RXN=7/10/16   • Wound Dressing Adhesive Hives     By pt report   • Ciprofloxacin    • Depakote  [Divalproex Sodium]    • Hydromorphone Hcl      Pt states she feels loopy   • Kdc:Metronidazole+Tartrazine    • Keflex Rash     Pt states she gets a rash with this medication  Tolerates ceftriaxone   • Levofloxacin Unspecified     Leg muscle cramps   • Penicillins        DIET  Orders Placed This Encounter   Procedures   • Diet Order Diabetic     Standing Status:   Standing     Number of Occurrences:   1     Order Specific Question:   Diet:     Answer:   Diabetic [3]       ACTIVITY     As tolerated. - encouraged increased attempts at activity,   when able to (peferably in safe/supervised environment).     CONSULTATIONS     Neurology - Dr. Koroma  Neuro-Ophthalmology - Dr. Yousif    PROCEDURES  IV steroids (high dose), for 4 days.       "

## 2020-04-25 NOTE — CARE PLAN
Problem: Safety  Goal: Will remain free from injury  Outcome: PROGRESSING AS EXPECTED  Pt free from injury. Pt's call light within reach, bed in lowest position and Q1 hour rounding done.      Problem: Safety  Goal: Will remain free from falls  Outcome: PROGRESSING AS EXPECTED   Pt free from falls. Pt's call light within reach, bed in lowest position, Q1 hour rounding done and pt educated on fall risk.

## 2020-04-25 NOTE — PROGRESS NOTES
Daily Progress Note:     Date of Service: 4/25/2020  Primary Team: [facility] Red Team    Attending: Liu Schafer M.D.   Senior Resident: Dr. Giles  Intern: Dr. Sanchez  Contact:  [phone]    Chief Complaint:   Chronic Relapsing Inflammatory Optic Neuropathy.    Subjective:   No acute events overnight reported,   slight improvement of mild/moderate left eye pain.  PRN med working for pain.  This morning during rounds patient stated pain better controlled.  Would like a day or two of med, for discharge.   Completed day 4 of Methylprednisolone.  Started on Prednisone, this AM.   Patient will follow up with Neuro/Ophthalmo next week.    Consultants/Specialty:  Neurology  Neuro/opthalmology     Review of Systems:      Review of Systems   Constitutional: Negative for chills, diaphoresis, fever and malaise/fatigue.   HENT: Negative for hearing loss, nosebleeds, sinus pain, sore throat and tinnitus.    Eyes: Positive for photophobia and pain. Negative for blurred vision, double vision and redness.   Respiratory: Negative for cough, hemoptysis and sputum production.    Cardiovascular: Negative for chest pain, palpitations, orthopnea and leg swelling.   Gastrointestinal: Negative for abdominal pain, heartburn, nausea and vomiting.   Genitourinary: Negative for dysuria, frequency, hematuria and urgency.   Musculoskeletal: Negative for joint pain, myalgias and neck pain.   Skin: Negative for itching and rash.   Neurological: Positive for sensory change. Negative for dizziness, speech change, focal weakness and headaches.   Endo/Heme/Allergies: Negative for environmental allergies. Does not bruise/bleed easily.   Psychiatric/Behavioral: Negative for depression and suicidal ideas. The patient is not nervous/anxious.        Objective Data:   Physical Exam:   Vitals:   Temp:  [36.1 °C (97 °F)-36.8 °C (98.2 °F)] 36.1 °C (97 °F)  Pulse:  [60-82] 82  Resp:  [16-18] 16  BP: (115-149)/(71-88) 137/88  SpO2:  [94 %-96 %] 96 %      Physical Exam  Constitutional:       General: She is not in acute distress.     Appearance: She is obese. She is not ill-appearing or toxic-appearing.   HENT:      Head: Normocephalic and atraumatic.      Nose: Nose normal. No congestion or rhinorrhea.      Mouth/Throat:      Mouth: Mucous membranes are moist.      Pharynx: Oropharynx is clear. No oropharyngeal exudate.   Eyes:      General: No scleral icterus.     Extraocular Movements: Extraocular movements intact.      Conjunctiva/sclera: Conjunctivae normal.      Pupils: Pupils are equal, round, and reactive to light.   Neck:      Musculoskeletal: Normal range of motion and neck supple. No neck rigidity or muscular tenderness.   Cardiovascular:      Rate and Rhythm: Normal rate and regular rhythm.      Pulses: Normal pulses.      Heart sounds: No murmur. No gallop.    Pulmonary:      Effort: Pulmonary effort is normal. No respiratory distress.      Breath sounds: Normal breath sounds. No wheezing, rhonchi or rales.   Abdominal:      General: Bowel sounds are normal. There is no distension.      Palpations: Abdomen is soft.      Tenderness: There is no abdominal tenderness. There is no guarding or rebound.   Musculoskeletal: Normal range of motion.         General: No swelling, tenderness or deformity.      Right lower leg: No edema.      Left lower leg: No edema.   Skin:     General: Skin is warm.      Capillary Refill: Capillary refill takes less than 2 seconds.      Coloration: Skin is not jaundiced.      Findings: No bruising.   Neurological:      General: No focal deficit present.      Mental Status: She is alert and oriented to person, place, and time.      Cranial Nerves: No cranial nerve deficit.      Sensory: No sensory deficit.   Psychiatric:         Mood and Affect: Mood normal.         Behavior: Behavior normal.         Thought Content: Thought content normal.         Labs:   Review: Bicarb better    Imaging:   Reviewed      A/P:    * Optic  neuritis- (present on admission)  Assessment & Plan  - Chronic relapsing inflammatory optic neuropathy (CRION) with multiple relapses over the past several months  - The patient presented today with painful, blurry left eye, the pain is worse with eye movements.  - Recurrent admissions to the hospital for similar complaints, She is on daily dose of prednisone 5 mg.  - MRI brain couldn't be done because of her spinal stimulator.  - CT head orderd and didn't show acute intracranial abnormalities.  - Patient had an appointment with Dr Yousif, but unable to attend due to her acute pain.  - CT Orbits 4/21 unremarkable  - Neurology ordered Anti MOG, Aquaporin 4 ab  - Patient will see Dr Yousif - 4/22  - 4/22 patient states improvement, pain is now 7/10 (was 15/10)  - Patient out of bed in wheelchair, able to transfer on her own.  - 4/23 Patient still with mild/moderate pain.  - 4/24 PRN meds working for pain. Pain better controlled today.  - 5/25 still some improvement in pain.  Ok to d/c.                      PLAN:  - Completed 4 days of Methylprednisolone 250 mg Q6 hrs x 4 days per Dr Yousif  - Oral prednisone 20 mg - started 4/25/2020 today  - DC home on po prednisone 20 mg  - Neurology on board  - f/u outpatient with Dr. Yousif.    Intracranial hypertension  Assessment & Plan  - Recurrent idiopathic intracranial hypertension unassociated with papilledema  - Was previously treated with lumbar puncture, still on diamox  - No headaches this admission  - CT head unremarkable 4/20/2020  - CT Orbits Unremarkable 4/22/2020  - Labs: Low Bicarb, likely secondary to Acetazolamide               Bicarb mild fluctuations  - 4/24 Diamox cut in half - to  500mg BID  - 4/24 Bicarb slightly better 16 from 13 yesterday  - 5/25 Bicarb improved to 18.     TONYA (obstructive sleep apnea)- (present on admission)  Assessment & Plan  - Not on CPAP at home.  - CTM  -    DM (diabetes mellitus) (HCC)- (present on admission)  Assessment &  Plan  - Diabetes type 2  - Most recent Hemoglobin A1c is 6 on 8/29/2019  - On Trulicity at home  - Will hold Trulicity  while inpatient  - Glucose monitoring  - Blood glucose below 140-180's inpatient  - Diabetic diet  - CTM    H/O prior ablation treatment- (present on admission)  Assessment & Plan  - Patient is a previous history of SVT s/p ablation  - On Ivabradine  - Denied palpitations, SOB, chest pain.      Intertriginous candidiasis  Assessment & Plan  Bilateral redness under arms bilaterally  Nystatin powder to be applied.    Restrictive lung disease  Assessment & Plan  - Pulmonary function tests are consistent with mild restrictive  process and may be secondary to obesity.  - PFTs were done on 9/5/2019 and showed  mild reduction in FEV1 at 79% of   predicted and FVC at 76% of predicted with an FEV1/FVC ratio of   88, no significant bronchodilator response, low lung volumes, with Total lung capacity of 83%, DLCO is low normal at 85% of predicted.  On home inhalers, will resume while inpatient.    Debility- (present on admission)  Assessment & Plan  - The patient reported that she is bed bound since December 2019  - Likely just deconditioning   - Multifactorial in nature, multiple co morbidities including morbid obesity and psychiatric problems   - 4/22 PT/OT No needs from them. Patient able to transfer, stand up, but not able to walk.  - No changes     Hypothyroidism- (present on admission)  Assessment & Plan  - TSH 0.612  - On Levothyroxine 100 mcg, the dose was increased on 3/3/2020  - Continue levothyroxine      Peripheral neuropathy and chornic pain syndrome (CMS-HCC)- (present on admission)  Assessment & Plan  - It could be secondary to High dose Acetazolamide   - Continue home lyrica & gabapentin.

## 2020-05-01 ENCOUNTER — APPOINTMENT (OUTPATIENT)
Dept: MEDICAL GROUP | Facility: MEDICAL CENTER | Age: 30
End: 2020-05-01
Payer: MEDICARE

## 2020-05-01 ENCOUNTER — HOSPITAL ENCOUNTER (EMERGENCY)
Facility: MEDICAL CENTER | Age: 30
End: 2020-05-01
Attending: EMERGENCY MEDICINE
Payer: MEDICARE

## 2020-05-01 ENCOUNTER — APPOINTMENT (OUTPATIENT)
Dept: RADIOLOGY | Facility: MEDICAL CENTER | Age: 30
End: 2020-05-01
Attending: EMERGENCY MEDICINE
Payer: MEDICARE

## 2020-05-01 VITALS
HEART RATE: 60 BPM | OXYGEN SATURATION: 95 % | SYSTOLIC BLOOD PRESSURE: 149 MMHG | RESPIRATION RATE: 20 BRPM | WEIGHT: 255 LBS | HEIGHT: 65 IN | BODY MASS INDEX: 42.49 KG/M2 | DIASTOLIC BLOOD PRESSURE: 76 MMHG
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
## 2020-05-01 DIAGNOSIS — R51.9 NONINTRACTABLE HEADACHE, UNSPECIFIED CHRONICITY PATTERN, UNSPECIFIED HEADACHE TYPE: ICD-10-CM

## 2020-05-01 LAB
ALBUMIN SERPL BCP-MCNC: 4.3 G/DL (ref 3.2–4.9)
ALBUMIN/GLOB SERPL: 2.3 G/DL
ALP SERPL-CCNC: 52 U/L (ref 30–99)
ALT SERPL-CCNC: 50 U/L (ref 2–50)
ANION GAP SERPL CALC-SCNC: 14 MMOL/L (ref 7–16)
AST SERPL-CCNC: 11 U/L (ref 12–45)
BASOPHILS # BLD AUTO: 0 % (ref 0–1.8)
BASOPHILS # BLD: 0 K/UL (ref 0–0.12)
BILIRUB SERPL-MCNC: 0.2 MG/DL (ref 0.1–1.5)
BUN SERPL-MCNC: 15 MG/DL (ref 8–22)
CALCIUM SERPL-MCNC: 9 MG/DL (ref 8.5–10.5)
CHLORIDE SERPL-SCNC: 109 MMOL/L (ref 96–112)
CO2 SERPL-SCNC: 17 MMOL/L (ref 20–33)
CREAT SERPL-MCNC: 0.82 MG/DL (ref 0.5–1.4)
DACRYOCYTES BLD QL SMEAR: NORMAL
EOSINOPHIL # BLD AUTO: 0 K/UL (ref 0–0.51)
EOSINOPHIL NFR BLD: 0 % (ref 0–6.9)
ERYTHROCYTE [DISTWIDTH] IN BLOOD BY AUTOMATED COUNT: 51.9 FL (ref 35.9–50)
GLOBULIN SER CALC-MCNC: 1.9 G/DL (ref 1.9–3.5)
GLUCOSE SERPL-MCNC: 110 MG/DL (ref 65–99)
HCG SERPL QL: NEGATIVE
HCT VFR BLD AUTO: 44.7 % (ref 37–47)
HGB BLD-MCNC: 14 G/DL (ref 12–16)
LYMPHOCYTES # BLD AUTO: 1.07 K/UL (ref 1–4.8)
LYMPHOCYTES NFR BLD: 9.6 % (ref 22–41)
MANUAL DIFF BLD: NORMAL
MCH RBC QN AUTO: 29.9 PG (ref 27–33)
MCHC RBC AUTO-ENTMCNC: 31.3 G/DL (ref 33.6–35)
MCV RBC AUTO: 95.3 FL (ref 81.4–97.8)
METAMYELOCYTES NFR BLD MANUAL: 2.6 %
MONOCYTES # BLD AUTO: 0.39 K/UL (ref 0–0.85)
MONOCYTES NFR BLD AUTO: 3.5 % (ref 0–13.4)
MORPHOLOGY BLD-IMP: NORMAL
NEUTROPHILS # BLD AUTO: 9.36 K/UL (ref 2–7.15)
NEUTROPHILS NFR BLD: 84.3 % (ref 44–72)
NRBC # BLD AUTO: 0 K/UL
NRBC BLD-RTO: 0 /100 WBC
PLATELET # BLD AUTO: 206 K/UL (ref 164–446)
PLATELET BLD QL SMEAR: NORMAL
PMV BLD AUTO: 11.7 FL (ref 9–12.9)
POIKILOCYTOSIS BLD QL SMEAR: NORMAL
POTASSIUM SERPL-SCNC: 4.3 MMOL/L (ref 3.6–5.5)
PROT SERPL-MCNC: 6.2 G/DL (ref 6–8.2)
RBC # BLD AUTO: 4.69 M/UL (ref 4.2–5.4)
RBC BLD AUTO: PRESENT
SODIUM SERPL-SCNC: 140 MMOL/L (ref 135–145)
WBC # BLD AUTO: 11.1 K/UL (ref 4.8–10.8)

## 2020-05-01 PROCEDURE — 96375 TX/PRO/DX INJ NEW DRUG ADDON: CPT

## 2020-05-01 PROCEDURE — 96374 THER/PROPH/DIAG INJ IV PUSH: CPT

## 2020-05-01 PROCEDURE — 85027 COMPLETE CBC AUTOMATED: CPT

## 2020-05-01 PROCEDURE — 85007 BL SMEAR W/DIFF WBC COUNT: CPT

## 2020-05-01 PROCEDURE — 80053 COMPREHEN METABOLIC PANEL: CPT

## 2020-05-01 PROCEDURE — 99284 EMERGENCY DEPT VISIT MOD MDM: CPT

## 2020-05-01 PROCEDURE — 70450 CT HEAD/BRAIN W/O DYE: CPT

## 2020-05-01 PROCEDURE — 700111 HCHG RX REV CODE 636 W/ 250 OVERRIDE (IP): Performed by: EMERGENCY MEDICINE

## 2020-05-01 PROCEDURE — 84703 CHORIONIC GONADOTROPIN ASSAY: CPT

## 2020-05-01 RX ORDER — KETOROLAC TROMETHAMINE 30 MG/ML
15 INJECTION, SOLUTION INTRAMUSCULAR; INTRAVENOUS ONCE
Status: COMPLETED | OUTPATIENT
Start: 2020-05-01 | End: 2020-05-01

## 2020-05-01 RX ORDER — DIPHENHYDRAMINE HYDROCHLORIDE 50 MG/ML
25 INJECTION INTRAMUSCULAR; INTRAVENOUS ONCE
Status: COMPLETED | OUTPATIENT
Start: 2020-05-01 | End: 2020-05-01

## 2020-05-01 RX ORDER — METOCLOPRAMIDE HYDROCHLORIDE 5 MG/ML
10 INJECTION INTRAMUSCULAR; INTRAVENOUS ONCE
Status: COMPLETED | OUTPATIENT
Start: 2020-05-01 | End: 2020-05-01

## 2020-05-01 RX ORDER — LACTULOSE 10 G/15ML
10 SOLUTION ORAL 2 TIMES DAILY
Qty: 1 BOTTLE | Refills: 2 | Status: SHIPPED | OUTPATIENT
Start: 2020-05-01 | End: 2020-07-10

## 2020-05-01 RX ADMIN — DIPHENHYDRAMINE HYDROCHLORIDE 25 MG: 50 INJECTION, SOLUTION INTRAMUSCULAR; INTRAVENOUS at 20:45

## 2020-05-01 RX ADMIN — KETOROLAC TROMETHAMINE 15 MG: 30 INJECTION, SOLUTION INTRAMUSCULAR at 20:45

## 2020-05-01 RX ADMIN — METOCLOPRAMIDE 10 MG: 5 INJECTION, SOLUTION INTRAMUSCULAR; INTRAVENOUS at 20:45

## 2020-05-01 ASSESSMENT — FIBROSIS 4 INDEX: FIB4 SCORE: 0.31

## 2020-05-02 NOTE — ED PROVIDER NOTES
"ED Provider Note    ER PROVIDER NOTE      CHIEF COMPLAINT  Chief Complaint   Patient presents with   • GLF     glf while transferring from cough to wheelchiar. hit head. -LOC. hx of concussions and encephalitis.    • Headache     \"my brain feels like its going to come out of my ears\" worse after hitting head.        HPI  Kristin Balderrama is a 29 y.o. female who presents to the emergency department complaining of headache.  Patient has history of chronic headaches and sees Dr. Rowe.  She has had some intermittent headache for weeks however today when she was transferring from her wheelchair she slipped and fell hitting her head on the hard floor.  She has had some increased headaches since then.  She denies any LOC.  She also reports some slight nausea.  She denies any vomiting.  She denies any focal weakness numbness or tingling.  She does not take any blood thinners.  She denies any new visual symptoms.  She denies any fevers or chills.  No recent illness, cough congestion or shortness of breath    REVIEW OF SYSTEMS  Pertinent positives include headache. Pertinent negatives include no vomiting or fever. See HPI for details. All other systems reviewed and are negative.    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    SURGICAL HISTORY   has a past surgical history that includes neuro dest facet l/s w/ig sngl (4/21/2015); recovery (1/27/2016); [place] by laparoscopy (8/29/2010); lumbar fusion anterior (8/21/2012); other cardiac surgery (1/2016); tonsillectomy; bowel stimulator insertion (7/13/2016); gastroscopy with balloon dilatation (N/A, 1/4/2017); muscle biopsy (Right, 1/26/2017); other abdominal surgery; and laminotomy.    FAMILY HISTORY  Family History   Problem Relation Age of Onset   • Hypertension Mother    • Sleep Apnea Mother    • Heart Disease Mother    • Other Mother         hypothryod   • Hypertension Maternal Uncle    • Heart Disease Maternal Grandmother    • Hypertension Maternal Grandmother    • No Known Problems Sister    • Other Sister         Narcolepsy;fibromyalsia;bone;nerve   • Genitourinary () Problems Sister         endometriosis       SOCIAL HISTORY  Social History     Socioeconomic History   • Marital status: Single     Spouse name: Not on file   • Number of children: Not on file   • Years of education: Not on file   • Highest education level: Not on file   Occupational History   • Not on file   Social Needs   • Financial resource strain: Not on file   • Food insecurity     Worry: Not on file     Inability: Not on file   • Transportation needs     Medical: Not on file     Non-medical: Not on file   Tobacco Use   • Smoking status: Never Smoker   • Smokeless tobacco: Never Used   Substance and Sexual Activity   • Alcohol use: No     Alcohol/week: 0.0 oz   • Drug use: Not Currently     Frequency: 7.0 times per week     Types: Marijuana, Oral     Comment: Medicinal edible's   • Sexual activity: Not Currently     Birth control/protection: Pill   Lifestyle   • Physical activity     Days per week: Not on  "file     Minutes per session: Not on file   • Stress: Not on file   Relationships   • Social connections     Talks on phone: Not on file     Gets together: Not on file     Attends Gnosticist service: Not on file     Active member of club or organization: Not on file     Attends meetings of clubs or organizations: Not on file     Relationship status: Not on file   • Intimate partner violence     Fear of current or ex partner: Not on file     Emotionally abused: Not on file     Physically abused: Not on file     Forced sexual activity: Not on file   Other Topics Concern   • Not on file   Social History Narrative    ** Merged History Encounter **           Social History     Substance and Sexual Activity   Drug Use Not Currently   • Frequency: 7.0 times per week   • Types: Marijuana, Oral    Comment: Medicinal edible's       CURRENT MEDICATIONS  Home Medications    **Home medications have not yet been reviewed for this encounter**         ALLERGIES  Allergies   Allergen Reactions   • Cefdinir Shortness of Breath and Itching     Tolerated 1/18/17  Tolerates ceftriaxone  Tolerated augmentin 8/2019    • Depakote [Divalproex Sodium] Unspecified     Muscle spasms/muscle pain and weakness     • Doxycycline Anaphylaxis and Vomiting     RXN=unknown   • Amitriptyline Unspecified     Headaches     • Aripiprazole [Abilify] Unspecified     Headaches/muscle twitching     • Clindamycin Nausea     Even with food     • Flagyl [Metronidazole Hcl] Unspecified     \"eye problems\"     • Flomax [Tamsulosin Hydrochloride] Swelling   • Levaquin Unspecified     Severe muscle cramps in legs  RXN=unknown   • Metformin Unspecified     Increased lactic acid      • Tape Rash     Tears skin off  coban with Tegaderm tape ok intermittently  RXN=ongoing   • Vancomycin Itching     Pt becomes flushed in face and chest.   RXN=7/10/16   • Wound Dressing Adhesive Hives     By pt report   • Ciprofloxacin    • Depakote  [Divalproex Sodium]    • Hydromorphone " "Hcl      Pt states she feels loopy   • Kdc:Metronidazole+Tartrazine    • Keflex Rash     Pt states she gets a rash with this medication  Tolerates ceftriaxone   • Levofloxacin Unspecified     Leg muscle cramps   • Penicillins        PHYSICAL EXAM  VITAL SIGNS: /67   Pulse 61   Resp 19   Ht 1.651 m (5' 5\")   Wt 115.7 kg (255 lb)   SpO2 94%   BMI 42.43 kg/m²   Pulse ox interpretation: I interpret this pulse ox as normal.    Constitutional: Alert in no apparent distress.  HENT: No signs of trauma, Bilateral external ears normal, Nose normal.   Eyes: Pupils are equal and reactive, Conjunctiva normal, Non-icteric.   Neck: Normal range of motion, No tenderness, Supple, No stridor.   Lymphatic: No lymphadenopathy noted.   Cardiovascular: Regular rate and rhythm, no murmurs.   Thorax & Lungs: Normal breath sounds, No respiratory distress, No wheezing, No chest tenderness.   Abdomen: Obese, bowel sounds normal, Soft, No tenderness, No masses, No pulsatile masses. No peritoneal signs.  Skin: Warm, Dry, No erythema, No rash.   Back: No bony tenderness, No CVA tenderness.   Extremities: Intact distal pulses, No edema, No tenderness, No cyanosis,   Musculoskeletal: Good range of motion in all major joints. No tenderness to palpation or major deformities noted.   Neurologic: Alert ,cranial nerves intact, speech is appropriate or not slurred, upper extremities bilaterally exhibit no drift, no dysmetria, 5 out of 5 strength with bilateral bicep/tricep/, sensation intact to light touch throughout upper extremities. Lower extremities strength 5 out of 5 thigh extension/flexion/abduction/adduction, knee extension/flexion, dorsiflexion plantar flexion. No clonus.  2+ patella reflexes.  sensation intact to light touch.  No focal deficits noted.  Psychiatric: Affect normal, Judgment normal, Mood normal.     DIAGNOSTIC STUDIES / PROCEDURES      LABS  Labs Reviewed   CBC WITH DIFFERENTIAL - Abnormal; Notable for the " following components:       Result Value    WBC 11.1 (*)     MCHC 31.3 (*)     RDW 51.9 (*)     Neutrophils-Polys 84.30 (*)     Lymphocytes 9.60 (*)     Neutrophils (Absolute) 9.36 (*)     All other components within normal limits   COMP METABOLIC PANEL - Abnormal; Notable for the following components:    Co2 17 (*)     Glucose 110 (*)     AST(SGOT) 11 (*)     All other components within normal limits   HCG QUAL SERUM   ESTIMATED GFR   DIFFERENTIAL MANUAL   PERIPHERAL SMEAR REVIEW   PLATELET ESTIMATE   MORPHOLOGY       All labs reviewed by me.    RADIOLOGY  CT-HEAD W/O   Final Result      No evidence of acute intracranial process.        The radiologist's interpretation of all radiological studies have been reviewed by me.    COURSE & MEDICAL DECISION MAKING  Nursing notes, VS, PMSFHx reviewed in chart.    8:20 PM Patient seen and examined at bedside. Patient will be treated with ketorolac, Reglan, Benadryl. Ordered for CT head to evaluate her symptoms.     9:45 PM  Patient is reevaluated, states her headache is improved, pending CT    10:43 PM  Patient is reevaluated again, she is still feeling improved.  Updated on results and will plan for discharge        Decision Making:  This is a 29 y.o. female presenting with chronic headache that was worse after falling and hitting her head.  CT was obtained and there is no evidence of intracranial bleed, subarachnoid, subdural, epidural or skull fracture.  Additionally no evidence of ventricular dilation or other significant intracranial process.  She has had no infectious symptoms or findings to suggest meningitis or encephalitis.  And her mental status is also appropriate.  No visual changes to suggest worsening or emergent process from her idiopathic intracranial hypertension either.  She will follow-up with her primary care and neurologist,    The patient will return for new or worsening symptoms and is stable at the time of discharge.    The patient is referred to a  primary physician for blood pressure management, diabetic screening, and for all other preventative health concerns.      DISPOSITION:  Patient will be discharged home in stable condition.    FOLLOW UP:  Torres Brody M.D.  [address] [place] [address] [id number] [phone]    In 1 week        OUTPATIENT MEDICATIONS:  New Prescriptions    No medications on file         FINAL IMPRESSION  1. Fall, initial encounter    2. Nonintractable headache, unspecified chronicity pattern, unspecified headache type         The note accurately reflects work and decisions made by me.  Torres Roberson M.D.  5/1/2020  10:45 PM

## 2020-05-02 NOTE — ED NOTES
Left voicemail with pts mom regarding pickup for pt. Pt is wheelchair bound and unable to ambulate.

## 2020-05-02 NOTE — ED TRIAGE NOTES
"Chief Complaint   Patient presents with   • GLF     [facility] -LOC. hx of concussions and encephalitis.    • Headache     \"my brain feels like its going to come out of my ears\" worse after hitting head.       Pt bib ems to ed after glf where pt hit head. - loc. Pt has complex hx, including encephalitis.  Reports headache and eye pain since yesterday that has been exacerbated by her fall today.  Pt also has tremors, reports these at baseline but worse currently.     Pt AAOx4. ERP to see.     /59   Pulse 73   Ht 1.651 m (5' 5\")   Wt 115.7 kg (255 lb)   SpO2 98%    "

## 2020-05-02 NOTE — ED NOTES
Pt tested for covid on 4/17 when she presented to the er for pneumonia.  Negative result.   Pt denies cough/sob, n/v, fever/chills, travel or exposure. No isolation needed at this time.

## 2020-05-02 NOTE — ED NOTES
Pt's mom to  pt from lobby. Pt given discharge instructions. Medication education provided. PIV removed, dressing applied. Signed copy in chart. Pt states all belongings in possession. Pt off unit via wheelchair.

## 2020-05-04 ENCOUNTER — APPOINTMENT (OUTPATIENT)
Dept: RADIOLOGY | Facility: MEDICAL CENTER | Age: 30
End: 2020-05-04
Attending: EMERGENCY MEDICINE
Payer: MEDICARE

## 2020-05-04 ENCOUNTER — TELEPHONE (OUTPATIENT)
Dept: HEALTH INFORMATION MANAGEMENT | Facility: OTHER | Age: 30
End: 2020-05-04

## 2020-05-04 ENCOUNTER — HOSPITAL ENCOUNTER (EMERGENCY)
Facility: MEDICAL CENTER | Age: 30
End: 2020-05-05
Attending: EMERGENCY MEDICINE
Payer: MEDICARE

## 2020-05-04 DIAGNOSIS — R06.00 DYSPNEA, UNSPECIFIED TYPE: ICD-10-CM

## 2020-05-04 PROCEDURE — 71045 X-RAY EXAM CHEST 1 VIEW: CPT

## 2020-05-04 PROCEDURE — 99284 EMERGENCY DEPT VISIT MOD MDM: CPT

## 2020-05-04 PROCEDURE — 700101 HCHG RX REV CODE 250: Performed by: EMERGENCY MEDICINE

## 2020-05-04 PROCEDURE — 94640 AIRWAY INHALATION TREATMENT: CPT

## 2020-05-04 PROCEDURE — 700111 HCHG RX REV CODE 636 W/ 250 OVERRIDE (IP): Performed by: EMERGENCY MEDICINE

## 2020-05-04 RX ORDER — PREDNISONE 20 MG/1
60 TABLET ORAL ONCE
Status: COMPLETED | OUTPATIENT
Start: 2020-05-04 | End: 2020-05-04

## 2020-05-04 RX ADMIN — PREDNISONE 60 MG: 20 TABLET ORAL at 23:15

## 2020-05-04 RX ADMIN — ALBUTEROL SULFATE 2.5 MG: 2.5 SOLUTION RESPIRATORY (INHALATION) at 23:37

## 2020-05-04 ASSESSMENT — COPD QUESTIONNAIRES
HAVE YOU SMOKED AT LEAST 100 CIGARETTES IN YOUR ENTIRE LIFE: NO/DON'T KNOW
DO YOU EVER COUGH UP ANY MUCUS OR PHLEGM?: YES, A FEW DAYS A WEEK OR MONTH
DURING THE PAST 4 WEEKS HOW MUCH DID YOU FEEL SHORT OF BREATH: SOME OF THE TIME
COPD SCREENING SCORE: 3

## 2020-05-04 ASSESSMENT — FIBROSIS 4 INDEX: FIB4 SCORE: 0.23

## 2020-05-04 ASSESSMENT — LIFESTYLE VARIABLES
TOTAL SCORE: 0
HAVE PEOPLE ANNOYED YOU BY CRITICIZING YOUR DRINKING: NO
TOTAL SCORE: 0
AVERAGE NUMBER OF DAYS PER WEEK YOU HAVE A DRINK CONTAINING ALCOHOL: 0
EVER HAD A DRINK FIRST THING IN THE MORNING TO STEADY YOUR NERVES TO GET RID OF A HANGOVER: NO
HOW MANY TIMES IN THE PAST YEAR HAVE YOU HAD 5 OR MORE DRINKS IN A DAY: 0
ON A TYPICAL DAY WHEN YOU DRINK ALCOHOL HOW MANY DRINKS DO YOU HAVE: 0
TOTAL SCORE: 0
EVER FELT BAD OR GUILTY ABOUT YOUR DRINKING: NO
DOES PATIENT WANT TO STOP DRINKING: NO
DO YOU DRINK ALCOHOL: NO
HAVE YOU EVER FELT YOU SHOULD CUT DOWN ON YOUR DRINKING: NO
CONSUMPTION TOTAL: NEGATIVE

## 2020-05-04 NOTE — TELEPHONE ENCOUNTER
1. Caller Name: Kristin Balderrama                        Call Back Number: [phone]  [facility] PCP or Specialty Provider: Yes Dr. Torres Brody        2.  Does patient have any active symptoms of respiratory illness? Yes, the patient reports the following respiratory symptoms: cough, headache and new loss of taste or smell Was recently inpatient at [facility] 4/20 - 4/25 chronic optic neuritis.  Requesting to speak with PCP for neurology referral.    3.  Does patient have any comoribidities? DM and Immunosuppressed    4.  Has the patient traveled in the last 14 days OR had any known contact with someone who is suspected or confirmed to have COVID-19?  No.    5. Disposition: Advised to perform self care, monitor for worsening symptoms and to call back in 3 days if no improvement    Note routed to [facility] Provider: Provider action needed: Dr. Torres Brody

## 2020-05-05 ENCOUNTER — TELEMEDICINE (OUTPATIENT)
Dept: MEDICAL GROUP | Facility: MEDICAL CENTER | Age: 30
End: 2020-05-05
Payer: MEDICARE

## 2020-05-05 VITALS
SYSTOLIC BLOOD PRESSURE: 134 MMHG | HEIGHT: 65 IN | DIASTOLIC BLOOD PRESSURE: 79 MMHG | OXYGEN SATURATION: 97 % | RESPIRATION RATE: 16 BRPM | TEMPERATURE: 96.8 F | HEART RATE: 66 BPM | BODY MASS INDEX: 42.82 KG/M2 | WEIGHT: 257 LBS

## 2020-05-05 VITALS — TEMPERATURE: 98.9 F

## 2020-05-05 DIAGNOSIS — G93.2 INTRACRANIAL HYPERTENSION: ICD-10-CM

## 2020-05-05 DIAGNOSIS — R51.9 CHRONIC NONINTRACTABLE HEADACHE, UNSPECIFIED HEADACHE TYPE: ICD-10-CM

## 2020-05-05 DIAGNOSIS — G56.21 ULNAR NEUROPATHY OF RIGHT UPPER EXTREMITY: ICD-10-CM

## 2020-05-05 DIAGNOSIS — G89.29 CHRONIC NONINTRACTABLE HEADACHE, UNSPECIFIED HEADACHE TYPE: ICD-10-CM

## 2020-05-05 PROBLEM — H70.90 MASTOIDITIS: Status: RESOLVED | Noted: 2019-12-16 | Resolved: 2020-05-05

## 2020-05-05 PROBLEM — R00.2 PALPITATIONS: Status: RESOLVED | Noted: 2018-10-01 | Resolved: 2020-05-05

## 2020-05-05 PROBLEM — B37.2 INTERTRIGINOUS CANDIDIASIS: Status: RESOLVED | Noted: 2020-04-20 | Resolved: 2020-05-05

## 2020-05-05 PROBLEM — J98.4 CHRONIC LUNG DISEASE: Status: RESOLVED | Noted: 2020-04-20 | Resolved: 2020-05-05

## 2020-05-05 PROBLEM — S21.009A BREAST WOUND: Status: RESOLVED | Noted: 2017-11-06 | Resolved: 2020-05-05

## 2020-05-05 PROBLEM — N39.0 UTI (URINARY TRACT INFECTION): Status: RESOLVED | Noted: 2017-05-13 | Resolved: 2020-05-05

## 2020-05-05 PROBLEM — J45.909 ASTHMA: Status: RESOLVED | Noted: 2019-06-20 | Resolved: 2020-05-05

## 2020-05-05 PROBLEM — R31.29 MICROSCOPIC HEMATURIA: Status: RESOLVED | Noted: 2019-06-13 | Resolved: 2020-05-05

## 2020-05-05 PROBLEM — G47.33 OSA (OBSTRUCTIVE SLEEP APNEA): Status: RESOLVED | Noted: 2018-01-09 | Resolved: 2020-05-05

## 2020-05-05 PROBLEM — R31.0 GROSS HEMATURIA: Status: RESOLVED | Noted: 2019-12-27 | Resolved: 2020-05-05

## 2020-05-05 PROBLEM — J98.4 RESTRICTIVE LUNG DISEASE: Status: RESOLVED | Noted: 2020-04-20 | Resolved: 2020-05-05

## 2020-05-05 PROBLEM — Z71.89 ADVANCED CARE PLANNING/COUNSELING DISCUSSION: Status: RESOLVED | Noted: 2019-12-20 | Resolved: 2020-05-05

## 2020-05-05 PROBLEM — E87.20 METABOLIC ACIDOSIS, NORMAL ANION GAP (NAG): Status: RESOLVED | Noted: 2019-08-30 | Resolved: 2020-05-05

## 2020-05-05 PROBLEM — N39.490 OVERFLOW INCONTINENCE OF URINE: Status: RESOLVED | Noted: 2019-12-27 | Resolved: 2020-05-05

## 2020-05-05 PROBLEM — N39.41 URGE INCONTINENCE OF URINE: Status: RESOLVED | Noted: 2019-12-27 | Resolved: 2020-05-05

## 2020-05-05 LAB
BLOOD CULTURE HOLD CXBCH: NORMAL
BLOOD CULTURE HOLD CXBCH: NORMAL

## 2020-05-05 PROCEDURE — 99213 OFFICE O/P EST LOW 20 MIN: CPT | Mod: 95,CR | Performed by: INTERNAL MEDICINE

## 2020-05-05 NOTE — ED NOTES
The patient has been provided with discharge education and information.  The patient was also provided with instructions on follow up care and return precautions.  The patient verbalizes understanding of discharge instructions, follow up care, and return precautions.  All questions have been answered.    NAD, A/Ox4, good color and appropriate at time of discharge.  Patient wheeled herself out of the department in her own wheelchair.

## 2020-05-05 NOTE — PROGRESS NOTES
This encounter was conducted via Zoom meeting  Verbal consent was obtained. Patient's identity was verified.     CC: Headaches, hand numbness                                                                                                                                      HPI:   Kristin presents today with the following.    1. Ulnar neuropathy of right upper extremity  Followed by neuro-ophthalmology reporting 3 finger numbness on the right hand.  Affecting mostly pinky and second finger and does go up the arm.  Denying any weakness.    2. Chronic nonintractable headache, unspecified headache type/ Intracranial hypertension  Again followed by neuro-ophthalmology placed on diuretics for increased intracranial pressures having persistent headaches.  She is requesting referral back to neurology she has not been seen there in 2 to 3 years.    Patient Active Problem List    Diagnosis Date Noted   • Intracranial hypertension 02/27/2020     Priority: High   • Optic neuritis 05/27/2019     Priority: High   • Polypharmacy 06/27/2016     Priority: High   • Bilateral heel pain secondary to calcaneal bone spurs 03/28/2016     Priority: High   • DM (diabetes mellitus) ([facility]) 04/26/2017     Priority: Medium   • Morbidly obese ([facility]) 03/07/2016     Priority: Medium   • H/O prior ablation treatment 02/10/2016     Priority: Medium   • Immunocompromised ([facility]) 11/06/2019     Priority: Low   • History of atrial fibrillation and cardiomyopathy? 11/06/2019     Priority: Low   • Debility 09/30/2019     Priority: Low   • Hypothyroidism 08/04/2017     Priority: Low   • Depression 10/28/2016     Priority: Low   • Schizophrenia ([facility]) 10/27/2016     Priority: Low   • GERD (gastroesophageal reflux disease) 03/07/2016     Priority: Low   • Peripheral neuropathy and chornic pain syndrome ([facility]) 03/06/2016     Priority: Low   • Fatty liver disease, nonalcoholic 01/19/2015     Priority: Low   • Anxiety 12/16/2014     Priority: Low   • Knee pain,  right 02/13/2014     Priority: Low   • Borderline personality disorder in adult ([facility]) 09/18/2010     Priority: Low   • History of supraventricular tachycardia 04/20/2020   • Schizoaffective disorder, depressive type ([facility]) 03/02/2020   • PTSD (post-traumatic stress disorder) 03/02/2020   • Stress incontinence 12/27/2019   • Mixed stress and urge urinary incontinence 12/27/2019   • Increased frequency of urination 12/27/2019   • History of optic neuritis 12/17/2019   • Mild intermittent asthma without complication 12/16/2019   • Closed head injury 11/30/2019   • Hypocalcemia 11/30/2019   • SVT (supraventricular tachycardia) ([facility]) 04/10/2019   • Functional diarrhea 01/05/2018   • Bowel and bladder incontinence 10/27/2016   • Galactorrhea 07/22/2016   • Scoliosis 03/07/2016       Current Outpatient Medications   Medication Sig Dispense Refill   • lactulose 10 GM/15ML Solution Take 15 mL by mouth 2 times a day. 1 Bottle 2   • acetaZOLAMIDE (DIAMOX) 250 MG Tab Take 2 Tabs by mouth 2 times a day. 120 Tab 0   • predniSONE (DELTASONE) 20 MG Tab Take 1 Tab by mouth every day. 30 Tab 0   • senna-docusate (PERICOLACE OR SENOKOT S) 8.6-50 MG Tab Take 2 Tabs by mouth 2 times a day.     • busPIRone (BUSPAR) 10 MG Tab tablet TAKE ONE TABLET BY MOUTH TWICE DAILY 60 Tab 0   • Ivabradine HCl (CORLANOR) 7.5 MG Tab Take 7.5 mg by mouth 2 Times a Day. Needs to schedule follow up with Dr. Wilde 180 Tab 1   • levothyroxine (SYNTHROID) 100 MCG Tab Take 1 Tab by mouth Every morning on an empty stomach. 30 Tab 2   • vitamin D (VITAMIND D3) 1000 UNIT Tab Take 1 Tab by mouth every day. 60 Tab 0   • modafinil (PROVIGIL) 200 MG Tab Take 1 Tab by mouth every morning for 60 days. 30 Tab 1   • fluoxetine (PROZAC) 40 MG capsule TAKE ONE CAPSULE BY MOUTH ONCE A DAY 30 Cap 0   • pregabalin (LYRICA) 300 MG capsule Take 300 mg by mouth 2 times a day.     • ziprasidone (GEODON) 40 MG Cap TAKE ONE CAPSULE BY MOUTH TWICE DAILY 60 Cap 0   •  OXcarbazepine (TRILEPTAL) 150 MG Tab Take 1 Tab by mouth 2 Times a Day. 60 Tab 2   • promethazine (PHENERGAN) 25 MG Suppos Insert 1 Suppository in rectum every 6 hours as needed for Nausea/Vomiting. 10 Suppository 0   • traZODone (DESYREL) 100 MG Tab Take 0.5 Tabs by mouth every evening. 30 Tab 3   • budesonide-formoterol (SYMBICORT) 160-4.5 MCG/ACT Aerosol Inhale 2 Puffs by mouth 2 Times a Day.     • aspirin EC (ECOTRIN) 81 MG Tablet Delayed Response Take 81 mg by mouth every day.     • ondansetron (ZOFRAN ODT) 4 MG TABLET DISPERSIBLE Take 4 mg by mouth every 6 hours as needed for Nausea.     • potassium chloride SA (KDUR) 20 MEQ Tab CR Take 20 mEq by mouth 2 times a day.     • tiotropium (SPIRIVA) 18 MCG Cap Inhale 1 Cap by mouth every day. 90 Cap 3   • ipratropium-albuterol (DUONEB) 0.5-2.5 (3) MG/3ML nebulizer solution 3 mL by Nebulization route every 6 hours as needed for Shortness of Breath. 60 Bullet 1   • etonogestrel (NEXPLANON) 68 MG Implant implant Inject 1 Each as instructed Once.     • mycophenolate (CELLCEPT) 500 MG tablet Take 1,000 mg by mouth 2 times a day.     • Dulaglutide (TRULICITY) 1.5 MG/0.5ML Solution Pen-injector Inject 1.5 mg as instructed every Friday.     • albuterol 108 (90 Base) MCG/ACT Aero Soln inhalation aerosol Inhale 2 Puffs by mouth every 6 hours as needed for Shortness of Breath.     • Melatonin 10 MG Tab Take 10 mg by mouth every evening.     • furosemide (LASIX) 80 MG Tab Take 80 mg by mouth 1 time daily as needed.       No current facility-administered medications for this visit.          Allergies as of 05/05/2020 - Reviewed 05/04/2020   Allergen Reaction Noted   • Depakote [divalproex sodium] Unspecified 06/14/2010   • Doxycycline Anaphylaxis and Vomiting 08/15/2012   • Amitriptyline Unspecified 10/31/2013   • Aripiprazole [abilify] Unspecified 01/17/2013   • Clindamycin Nausea 02/02/2011   • Flagyl [metronidazole hcl] Unspecified 03/31/2011   • Flomax [tamsulosin  hydrochloride] Swelling 09/24/2009   • Levaquin Unspecified 10/31/2013   • Metformin Unspecified 07/23/2013   • Tape Rash 08/15/2012   • Vancomycin Itching 07/10/2016   • Wound dressing adhesive Hives 01/12/2018   • Ciprofloxacin  01/16/2020   • Depakote  [divalproex sodium]  01/16/2020   • Hydromorphone hcl  01/16/2020   • Kdc:metronidazole+tartrazine  01/16/2020   • Keflex Rash 01/01/2017   • Levofloxacin Unspecified 10/27/2016   • Penicillins  01/16/2020        ROS:  Denies, chest pain, Shortness of breath, Edema.     Temp 37.2 °C (98.9 °F)       Physical Exam:  Constitutional: Alert, no distress, well-groomed.  Skin: No rashes in visible areas.  Eye: Round. Conjunctiva clear, lNo icterus.   ENMT: Lips pink without lesions, good dentition, moist mucous membranes. Phonation normal.  Neck: No masses, no thyromegaly. Moves freely without pain.  CV: Pulse as reported by patient  Respiratory: Unlabored respiratory effort, no cough or audible wheeze  Psych: Alert and oriented x3, normal affect and mood.          Assessment and Plan.   29 y.o. female with the following issues.    1. Ulnar neuropathy of right upper extremity  Consistent with possible ulnar neuropathy difficult with lack of examination but distribution seems right.  Referred to orthopedics.  - REFERRAL TO ORTHOPEDICS    2. Chronic nonintractable headache, unspecified headache type/. Intracranial hypertension  Referring to neurology.  - REFERRAL TO NEUROLOGY

## 2020-05-05 NOTE — ED TRIAGE NOTES
Chief Complaint   Patient presents with   • Shortness of Breath   • Anxiety     EMS brought in for SOB. Patient states that she feels SOB. EMS was called multiple times but patient reported relief after she took her albuterol. VS stable. .

## 2020-05-05 NOTE — ED PROVIDER NOTES
"ED Provider Note    CHIEF COMPLAINT  Chief Complaint   Patient presents with   • Shortness of Breath   • Anxiety       HPI  Kristin Balderrama is a 29 y.o. female who presents with difficulty with breathing.  The patient states is been increasing over last 24 hours.  She has a known history of asthma.  She was advised by EMS today and via telemedicine she was diagnosed with possible pneumonia and placed on a cephalosporin.  She is also taking 20 mg of prednisone daily.  She is been utilizing her albuterol with no relief.  She does not have any known exposure to coronavirus.  She has not had any fevers.  She does not have any chest pain.    REVIEW OF SYSTEMS  See HPI for further details. All other systems are negative.     PAST MEDICAL HISTORY  Past Medical History:   Diagnosis Date   • Sinus tachycardia 10/31/2013   • Pain 08-15-12    back, 7/10   • Pneumonia 2012   • Chronic UTI 9/18/2010   • Abdominal pain    • Anginal syndrome     random chest pain especially with tachycardia   • Apnea, sleep    • Arrhythmia     \"sinus tachycardia\", cariologist, Dr. Kumar; ablation 2/2016   • Arthritis     osteo   • ASTHMA     when around smoke   • Atrial fibrillation (HCC)    • Back pain    • Borderline personality disorder (HCC)    • Breath shortness     with tachycardia   • Bronchitis    • Cardiac arrhythmia    • Chickenpox    • Cough    • Daytime sleepiness    • Depression    • Diabetes (HCC)    • Diarrhea    • Disorder of thyroid    • Fall    • Fatigue    • Frequent headaches    • Gasping for breath    • Gynecological disorder     PCOS   • Headache(784.0)    • Heart burn    • History of falling    • Hypertension    • Indigestion    • Migraine    • 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HCC)    • 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HCC)    • Nausea    • Obesity    • Painful joint    • PCOS (polycystic ovarian syndrome)    • Psychosis (HCC)    • Renal disorder     \"kidney disease, stage 1\" nephrologist, Dr. 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   • Urinary bladder disorder     Suprapubic cath   • Urinary incontinence    • Weakness    • Wears glasses        FAMILY HISTORY  [unfilled]    SOCIAL HISTORY  Social History     Socioeconomic History   • Marital status: Single     Spouse name: Not on file   • Number of children: Not on file   • Years of education: Not on file   • Highest education level: Not on file   Occupational History   • Not on file   Social Needs   • Financial resource strain: Not on file   • Food insecurity     Worry: Not on file     Inability: Not on file   • Transportation needs     Medical: Not on file     Non-medical: Not on file   Tobacco Use   • Smoking status: Never Smoker   • Smokeless tobacco: Never Used   Substance and Sexual Activity   • Alcohol use: No     Alcohol/week: 0.0 oz   • Drug use: Not Currently     Frequency: 7.0 times per week     Types: Marijuana, Oral     Comment: Medicinal edible's   • Sexual activity: Not Currently     Birth control/protection: Pill   Lifestyle   • Physical activity     Days per week: Not on file     Minutes per session: Not on file   • Stress: Not on file   Relationships   • Social connections     Talks on phone: Not on file     Gets together: Not on file     Attends Worship service: Not on file     Active member of club or organization: Not on file     Attends meetings of clubs or organizations: Not on file     Relationship status: Not on file   • Intimate partner violence     Fear of current or ex partner: Not on file     Emotionally abused: Not on file     Physically abused: Not on file     Forced sexual activity: Not on file   Other Topics Concern   • Not on file   Social History Narrative    ** Merged History Encounter **            SURGICAL HISTORY  Past Surgical History:   Procedure Laterality " "Date   • MUSCLE BIOPSY Right 1/26/2017    Procedure: MUSCLE BIOPSY - THIGH;  Surgeon: Isidro Vigil M.D.;  Location: SURGERY [facility];  Service:    • GASTROSCOPY WITH BALLOON DILATATION N/A 1/4/2017    Procedure: GASTROSCOPY WITH DILATATION;  Surgeon: Torres Vargas M.D.;  Location: SURGERY [facility];  Service:    • BOWEL STIMULATOR INSERTION  7/13/2016    Procedure: BOWEL STIMULATOR INSERTION FOR PERMANENT INTERSTIM SACRAL IMPLANT;  Surgeon: Joe Noyola M.D.;  Location: SURGERY [facility];  Service:    • RECOVERY  1/27/2016    Procedure: CATH LAB EP STUDY WITH SINUS NODE MODIFICATION LA;  Surgeon: [facility];  [place] [facility]  Service:    • OTHER CARDIAC SURGERY  1/2016    cardiac ablation   • NEURO DEST FACET L/S W/IG SNGL  4/21/2015    Performed by Reza Tabor at SURGERY [facility]   • LUMBAR FUSION ANTERIOR  8/21/2012    Performed by SUSIE SAWANT at SURGERY [facility]   • ALYSSA BY LAPAROSCOPY  8/29/2010    Performed by SHAYY JOHNS at [facility]   • LAMINOTOMY     • OTHER ABDOMINAL SURGERY     • TONSILLECTOMY      tonsillectomy       CURRENT MEDICATIONS  Home Medications    **Home medications have not yet been reviewed for this encounter**         ALLERGIES  Allergies   Allergen Reactions   • Cefdinir Shortness of Breath and Itching     Tolerated 1/18/17  Tolerates ceftriaxone  Tolerated augmentin 8/2019    • Depakote [Divalproex Sodium] Unspecified     Muscle spasms/muscle pain and weakness     • Doxycycline Anaphylaxis and Vomiting     RXN=unknown   • Amitriptyline Unspecified     Headaches     • Aripiprazole [Abilify] Unspecified     Headaches/muscle twitching     • Clindamycin Nausea     Even with food     • Flagyl [Metronidazole Hcl] Unspecified     \"eye problems\"     • Flomax [Tamsulosin Hydrochloride] Swelling   • Levaquin Unspecified     Severe muscle cramps in legs  RXN=unknown   • Metformin Unspecified     " "Increased lactic acid      • Tape Rash     Tears skin off  coban with Tegaderm tape ok intermittently  RXN=ongoing   • Vancomycin Itching     Pt becomes flushed in face and chest.   RXN=7/10/16   • Wound Dressing Adhesive Hives     By pt report   • Ciprofloxacin    • Depakote  [Divalproex Sodium]    • Hydromorphone Hcl      Pt states she feels loopy   • Kdc:Metronidazole+Tartrazine    • Keflex Rash     Pt states she gets a rash with this medication  Tolerates ceftriaxone   • Levofloxacin Unspecified     Leg muscle cramps   • Penicillins        PHYSICAL EXAM  VITAL SIGNS: /79   Pulse 98   Temp 36 °C (96.8 °F) (Temporal)   Resp (!) 22   Ht 1.651 m (5' 5\")   Wt 116.6 kg (257 lb)   SpO2 98%   BMI 42.77 kg/m²       Constitutional: Mild acute distress, Non-toxic appearance.   HENT: Normocephalic, Atraumatic, Bilateral external ears normal, Oropharynx moist, No oral exudates, Nose normal.   Eyes: PERRLA, EOMI, Conjunctiva normal, No discharge.   Neck: Normal range of motion, No tenderness, Supple, No stridor.   Lymphatic: No lymphadenopathy noted.   Cardiovascular: Normal heart rate, Normal rhythm, No murmurs, No rubs, No gallops.   Thorax & Lungs: Slightly diminished throughout, No respiratory distress, No wheezing, No chest tenderness.   Abdomen: Bowel sounds normal, Soft, No tenderness, No masses, No pulsatile masses.   Skin: Warm, Dry, No erythema, No rash.   Back: No tenderness, No CVA tenderness.   Extremities: Intact distal pulses, No edema, No tenderness, No cyanosis, No clubbing.   Neurologic: Alert & oriented x 3, Normal motor function, Normal sensory function, No focal deficits noted.   Psychiatric: Affect normal, Judgment normal, Mood normal.       RADIOLOGY/PROCEDURES  DX-CHEST-PORTABLE (1 VIEW)   Final Result      No acute cardiopulmonary abnormality.            COURSE & MEDICAL DECISION MAKING  Pertinent Labs & Imaging studies reviewed. (See chart for details)  This a 29-year-old female who " presents the emergency department with difficulty with breathing.  Clinically she was diminished on arrival and I suspect she is having an asthma exacerbation.  The patient was treated with albuterol and 60 mg of oral prednisone.  On repeat examination she has had increased aeration and feels significantly better.  She is not hypoxic.  Chest x-ray does not show any evidence of focal infiltrates.  Therefore I suspect this is all from acute asthma exacerbation.  She is concern for pneumonia but the chest x-ray is normal.  The patient does not appear toxic.  She will be discharged home with instructions to continue her current medication and return for increased work of breathing.    FINAL IMPRESSION  1.  Dyspnea  2.  Suspect secondary to asthma exacerbation    Disposition  The patient will be discharged in improved condition         Electronically signed by: Broderick Solomon M.D., 5/4/2020 10:59 PM

## 2020-05-07 RX ORDER — FLUOXETINE HYDROCHLORIDE 40 MG/1
CAPSULE ORAL
Qty: 30 CAP | Refills: 0 | Status: SHIPPED | OUTPATIENT
Start: 2020-05-07 | End: 2020-06-15

## 2020-05-07 RX ORDER — ZIPRASIDONE HYDROCHLORIDE 40 MG/1
CAPSULE ORAL
Qty: 60 CAP | Refills: 0 | Status: SHIPPED | OUTPATIENT
Start: 2020-05-07 | End: 2020-06-11 | Stop reason: SDUPTHER

## 2020-05-11 ENCOUNTER — TELEMEDICINE (OUTPATIENT)
Dept: MEDICAL GROUP | Facility: MEDICAL CENTER | Age: 30
End: 2020-05-11
Payer: MEDICARE

## 2020-05-11 DIAGNOSIS — R21 RASH: ICD-10-CM

## 2020-05-11 DIAGNOSIS — E11.9 TYPE 2 DIABETES MELLITUS WITHOUT COMPLICATION, WITH LONG-TERM CURRENT USE OF INSULIN (HCC): ICD-10-CM

## 2020-05-11 DIAGNOSIS — Z79.4 TYPE 2 DIABETES MELLITUS WITHOUT COMPLICATION, WITH LONG-TERM CURRENT USE OF INSULIN (HCC): ICD-10-CM

## 2020-05-11 PROCEDURE — 99213 OFFICE O/P EST LOW 20 MIN: CPT | Mod: 95,CR | Performed by: INTERNAL MEDICINE

## 2020-05-11 RX ORDER — INSULIN GLARGINE 100 [IU]/ML
15 INJECTION, SOLUTION SUBCUTANEOUS EVERY EVENING
Qty: 5 PEN | Refills: 3
Start: 2020-05-11 | End: 2020-08-15

## 2020-05-11 RX ORDER — MUPIROCIN CALCIUM 20 MG/G
1 CREAM TOPICAL 2 TIMES DAILY
Qty: 1 TUBE | Refills: 0 | Status: ON HOLD
Start: 2020-05-11 | End: 2020-05-19

## 2020-05-11 NOTE — PROGRESS NOTES
This encounter was conducted via Zoom meeting  Verbal consent was obtained. Patient's identity was verified.       CC: Rash, diabetes                                                                                                                                      HPI:   Kristin presents today with the following.    1. Rash  Presents complaining of a rash under bilateral armpits right greater than left.  She reports small blisters but no underlying abscess.  Similar events happening on the left lower left ureter.  No fevers or chills no spread.    2. Type 2 diabetes mellitus without complication, with long-term current use of insulin ([facility])  Recently started back on insulin because of high doses of steroid she reports 1 hypoglycemic events on 20 of Lantus      Patient Active Problem List    Diagnosis Date Noted   • Intracranial hypertension 02/27/2020     Priority: High   • Optic neuritis 05/27/2019     Priority: High   • Polypharmacy 06/27/2016     Priority: High   • Bilateral heel pain secondary to calcaneal bone spurs 03/28/2016     Priority: High   • Morbidly obese ([facility]) 03/07/2016     Priority: Medium   • H/O prior ablation treatment 02/10/2016     Priority: Medium   • Immunocompromised ([facility]) 11/06/2019     Priority: Low   • History of atrial fibrillation and cardiomyopathy? 11/06/2019     Priority: Low   • Debility 09/30/2019     Priority: Low   • Hypothyroidism 08/04/2017     Priority: Low   • Depression 10/28/2016     Priority: Low   • Schizophrenia ([facility]) 10/27/2016     Priority: Low   • GERD (gastroesophageal reflux disease) 03/07/2016     Priority: Low   • Peripheral neuropathy and chornic pain syndrome ([facility]) 03/06/2016     Priority: Low   • Fatty liver disease, nonalcoholic 01/19/2015     Priority: Low   • Anxiety 12/16/2014     Priority: Low   • Knee pain, right 02/13/2014     Priority: Low   • Borderline personality disorder in adult ([facility]) 09/18/2010     Priority: Low   • Type 2 diabetes mellitus  without complication, with long-term current use of insulin ([facility]) 05/11/2020   • History of supraventricular tachycardia 04/20/2020   • Schizoaffective disorder, depressive type ([facility]) 03/02/2020   • PTSD (post-traumatic stress disorder) 03/02/2020   • Stress incontinence 12/27/2019   • Mixed stress and urge urinary incontinence 12/27/2019   • Increased frequency of urination 12/27/2019   • History of optic neuritis 12/17/2019   • Mild intermittent asthma without complication 12/16/2019   • Closed head injury 11/30/2019   • Hypocalcemia 11/30/2019   • SVT (supraventricular tachycardia) ([facility]) 04/10/2019   • Functional diarrhea 01/05/2018   • Bowel and bladder incontinence 10/27/2016   • Galactorrhea 07/22/2016   • Scoliosis 03/07/2016       Current Outpatient Medications   Medication Sig Dispense Refill   • mupirocin calcium (BACTROBAN) 2 % Cream Apply 1 g to affected area(s) 2 times a day. 1 Tube 0   • insulin glargine (LANTUS SOLOSTAR) 100 UNIT/ML Solution Pen-injector injection Inject 15 Units as instructed every evening. 5 PEN 3   • ziprasidone (GEODON) 40 MG Cap TAKE ONE CAPSULE BY MOUTH TWICE DAILY 60 Cap 0   • fluoxetine (PROZAC) 40 MG capsule TAKE ONE CAPSULE BY MOUTH ONCE A DAY 30 Cap 0   • lactulose 10 GM/15ML Solution Take 15 mL by mouth 2 times a day. 1 Bottle 2   • acetaZOLAMIDE (DIAMOX) 250 MG Tab Take 2 Tabs by mouth 2 times a day. 120 Tab 0   • predniSONE (DELTASONE) 20 MG Tab Take 1 Tab by mouth every day. 30 Tab 0   • senna-docusate (PERICOLACE OR SENOKOT S) 8.6-50 MG Tab Take 2 Tabs by mouth 2 times a day.     • busPIRone (BUSPAR) 10 MG Tab tablet TAKE ONE TABLET BY MOUTH TWICE DAILY 60 Tab 0   • Ivabradine HCl (CORLANOR) 7.5 MG Tab Take 7.5 mg by mouth 2 Times a Day. Needs to schedule follow up with Dr. Wilde 180 Tab 1   • levothyroxine (SYNTHROID) 100 MCG Tab Take 1 Tab by mouth Every morning on an empty stomach. 30 Tab 2   • vitamin D (VITAMIND D3) 1000 UNIT Tab Take 1 Tab by mouth every  day. 60 Tab 0   • modafinil (PROVIGIL) 200 MG Tab Take 1 Tab by mouth every morning for 60 days. 30 Tab 1   • pregabalin (LYRICA) 300 MG capsule Take 300 mg by mouth 2 times a day.     • OXcarbazepine (TRILEPTAL) 150 MG Tab Take 1 Tab by mouth 2 Times a Day. 60 Tab 2   • promethazine (PHENERGAN) 25 MG Suppos Insert 1 Suppository in rectum every 6 hours as needed for Nausea/Vomiting. 10 Suppository 0   • traZODone (DESYREL) 100 MG Tab Take 0.5 Tabs by mouth every evening. 30 Tab 3   • budesonide-formoterol (SYMBICORT) 160-4.5 MCG/ACT Aerosol Inhale 2 Puffs by mouth 2 Times a Day.     • aspirin EC (ECOTRIN) 81 MG Tablet Delayed Response Take 81 mg by mouth every day.     • ondansetron (ZOFRAN ODT) 4 MG TABLET DISPERSIBLE Take 4 mg by mouth every 6 hours as needed for Nausea.     • potassium chloride SA (KDUR) 20 MEQ Tab CR Take 20 mEq by mouth 2 times a day.     • tiotropium (SPIRIVA) 18 MCG Cap Inhale 1 Cap by mouth every day. 90 Cap 3   • ipratropium-albuterol (DUONEB) 0.5-2.5 (3) MG/3ML nebulizer solution 3 mL by Nebulization route every 6 hours as needed for Shortness of Breath. 60 Bullet 1   • etonogestrel (NEXPLANON) 68 MG Implant implant Inject 1 Each as instructed Once.     • mycophenolate (CELLCEPT) 500 MG tablet Take 1,000 mg by mouth 2 times a day.     • Dulaglutide (TRULICITY) 1.5 MG/0.5ML Solution Pen-injector Inject 1.5 mg as instructed every Friday.     • albuterol 108 (90 Base) MCG/ACT Aero Soln inhalation aerosol Inhale 2 Puffs by mouth every 6 hours as needed for Shortness of Breath.     • Melatonin 10 MG Tab Take 10 mg by mouth every evening.     • furosemide (LASIX) 80 MG Tab Take 80 mg by mouth 1 time daily as needed.       No current facility-administered medications for this visit.          Allergies as of 05/11/2020 - Reviewed 05/05/2020   Allergen Reaction Noted   • Depakote [divalproex sodium] Unspecified 06/14/2010   • Doxycycline Anaphylaxis and Vomiting 08/15/2012   • Amitriptyline  Unspecified 10/31/2013   • Aripiprazole [abilify] Unspecified 01/17/2013   • Clindamycin Nausea 02/02/2011   • Flagyl [metronidazole hcl] Unspecified 03/31/2011   • Flomax [tamsulosin hydrochloride] Swelling 09/24/2009   • Levaquin Unspecified 10/31/2013   • Metformin Unspecified 07/23/2013   • Tape Rash 08/15/2012   • Vancomycin Itching 07/10/2016   • Wound dressing adhesive Hives 01/12/2018   • Ciprofloxacin  01/16/2020   • Depakote  [divalproex sodium]  01/16/2020   • Hydromorphone hcl  01/16/2020   • Kdc:metronidazole+tartrazine  01/16/2020   • Keflex Rash 01/01/2017   • Levofloxacin Unspecified 10/27/2016   • Penicillins  01/16/2020        ROS:  Denies, chest pain, Shortness of breath, Edema.     There were no vitals taken for this visit.      Physical Exam:  Constitutional: Alert, no distress, well-groomed.  Skin: No rashes in visible areas.  Eye: Round. Conjunctiva clear, lNo icterus.   ENMT: Lips pink without lesions, good dentition, moist mucous membranes. Phonation normal.  Neck: No masses, no thyromegaly. Moves freely without pain.  CV: Pulse as reported by patient  Respiratory: Unlabored respiratory effort, no cough or audible wheeze  Psych: Alert and oriented x3, normal affect and mood.          Assessment and Plan.   29 y.o. female with the following issues.    1. Rash  Attempt for folliculitis hopefully assessed on video have recommended topical treatment.    2. Type 2 diabetes mellitus without complication, with long-term current use of insulin ([facility])  Reviewed insulin insulin to 15 units.

## 2020-05-15 ENCOUNTER — APPOINTMENT (OUTPATIENT)
Dept: RADIOLOGY | Facility: IMAGING CENTER | Age: 30
End: 2020-05-15
Attending: NURSE PRACTITIONER
Payer: MEDICARE

## 2020-05-15 ENCOUNTER — TELEPHONE (OUTPATIENT)
Dept: HEALTH INFORMATION MANAGEMENT | Facility: OTHER | Age: 30
End: 2020-05-15

## 2020-05-15 ENCOUNTER — OFFICE VISIT (OUTPATIENT)
Dept: URGENT CARE | Facility: CLINIC | Age: 30
End: 2020-05-15
Payer: MEDICARE

## 2020-05-15 VITALS
RESPIRATION RATE: 16 BRPM | HEART RATE: 78 BPM | WEIGHT: 261 LBS | TEMPERATURE: 98 F | DIASTOLIC BLOOD PRESSURE: 78 MMHG | BODY MASS INDEX: 43.49 KG/M2 | OXYGEN SATURATION: 96 % | HEIGHT: 65 IN | SYSTOLIC BLOOD PRESSURE: 130 MMHG

## 2020-05-15 DIAGNOSIS — R11.0 NAUSEA: ICD-10-CM

## 2020-05-15 DIAGNOSIS — R05.9 COUGH: ICD-10-CM

## 2020-05-15 DIAGNOSIS — Z87.09 HISTORY OF ASTHMA: ICD-10-CM

## 2020-05-15 DIAGNOSIS — Z20.822 SUSPECTED COVID-19 VIRUS INFECTION: ICD-10-CM

## 2020-05-15 PROCEDURE — 99214 OFFICE O/P EST MOD 30 MIN: CPT | Performed by: NURSE PRACTITIONER

## 2020-05-15 PROCEDURE — 71046 X-RAY EXAM CHEST 2 VIEWS: CPT | Mod: TC | Performed by: NURSE PRACTITIONER

## 2020-05-15 RX ORDER — ONDANSETRON 2 MG/ML
4 INJECTION INTRAMUSCULAR; INTRAVENOUS ONCE
Status: COMPLETED | OUTPATIENT
Start: 2020-05-15 | End: 2020-05-15

## 2020-05-15 RX ORDER — ONDANSETRON 4 MG/1
4 TABLET, ORALLY DISINTEGRATING ORAL EVERY 6 HOURS PRN
Qty: 30 TAB | Refills: 0 | Status: ON HOLD
Start: 2020-05-15 | End: 2020-05-19

## 2020-05-15 RX ADMIN — ONDANSETRON 4 MG: 2 INJECTION INTRAMUSCULAR; INTRAVENOUS at 16:44

## 2020-05-15 ASSESSMENT — ENCOUNTER SYMPTOMS
BLOOD IN STOOL: 0
CHILLS: 1
STRIDOR: 0
ABDOMINAL PAIN: 1
WHEEZING: 1
SORE THROAT: 0
CONSTIPATION: 0
FEVER: 1
FLANK PAIN: 0
MYALGIAS: 1
SHORTNESS OF BREATH: 1
EYE DISCHARGE: 0
HEADACHES: 0
DIARRHEA: 1
COUGH: 1
SPUTUM PRODUCTION: 1
PALPITATIONS: 0
NAUSEA: 1
EYE REDNESS: 0
VOMITING: 1

## 2020-05-15 ASSESSMENT — FIBROSIS 4 INDEX: FIB4 SCORE: 0.23

## 2020-05-15 NOTE — PROGRESS NOTES
Subjective:   Kristin Balderrama is a 29 y.o. female who presents for Cough (x2 days, productive cough, fever up to 101); Diarrhea (x3 days, lower abdominal pain, nausea, vomiting ); and Shortness of Breath (x2 days, chest feels tight, hx of asthma )        Cough   This is a new problem. The current episode started in the past 7 days (Started 2 days ago). The problem has been unchanged. The cough is productive of sputum. Associated symptoms include chills, a fever, myalgias, nasal congestion, postnasal drip, shortness of breath and wheezing. Pertinent negatives include no chest pain, ear pain, eye redness, headaches, rash or sore throat. She has tried a beta-agonist inhaler for the symptoms. The treatment provided mild relief. Her past medical history is significant for asthma.    States she is scheduled for COVID-19 testing tomorrow.    Review of Systems   Constitutional: Positive for chills, fever and malaise/fatigue.   HENT: Positive for postnasal drip. Negative for congestion, ear discharge, ear pain and sore throat.    Eyes: Negative for discharge and redness.   Respiratory: Positive for cough, sputum production, shortness of breath and wheezing. Negative for stridor.    Cardiovascular: Negative for chest pain and palpitations.   Gastrointestinal: Positive for abdominal pain, diarrhea, nausea and vomiting. Negative for blood in stool, constipation and melena.   Genitourinary: Negative for dysuria, flank pain, frequency, hematuria and urgency.   Musculoskeletal: Positive for myalgias.   Skin: Negative for itching and rash.   Neurological: Negative for headaches.   All other systems reviewed and are negative.    PMH:  has a past medical history of Abdominal pain, Anginal syndrome, Apnea, sleep, Arrhythmia, Arthritis, ASTHMA, Atrial fibrillation (HCC), Back pain, Borderline personality disorder (HCC), Breath shortness, Bronchitis, Cardiac arrhythmia, Chickenpox, Chronic UTI (9/18/2010), Cough, Daytime  "sleepiness, Depression, Diabetes (HCC), Diarrhea, Disorder of thyroid, Fall, Fatigue, Frequent headaches, Gasping for breath, Gynecological disorder, Headache(784.0), Heart burn, History of falling, Hypertension, Indigestion, Migraine, 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[facility]), 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[facility]), Nausea, Obesity, Pain (08-15-12), Painful joint, PCOS (polycystic ovarian syndrome), Pneumonia (2012), Psychosis ([facility]), Renal disorder, Ringing in ears, Scoliosis, Shortness of breath, Sinus tachycardia (10/31/2013), Sleep apnea, Snoring, Tonsillitis, Tuberculosis, Urinary bladder disorder, Urinary incontinence, Weakness, and Wears glasses.  ALLERGIES:   Allergies   Allergen Reactions   • Depakote [Divalproex Sodium] Unspecified     Muscle spasms/muscle pain and weakness     • Doxycycline Anaphylaxis and Vomiting     RXN=unknown   • Amitriptyline Unspecified     Headaches     • Aripiprazole [Abilify] Unspecified     Headaches/muscle twitching     • Clindamycin Nausea     Even with food     • Flagyl [Metronidazole Hcl] Unspecified     \"eye problems\"     • Flomax [Tamsulosin Hydrochloride] Swelling   • Levaquin Unspecified     Severe muscle cramps in legs  RXN=unknown   • Metformin Unspecified     Increased lactic acid      • Tape Rash     Tears skin off  coban with Tegaderm tape ok intermittently  RXN=ongoing   • Vancomycin Itching     Pt becomes flushed in face and chest.   RXN=7/10/16   • Wound Dressing Adhesive Hives     By pt report   • Ciprofloxacin    • Depakote  [Divalproex Sodium]    • Hydromorphone Hcl      Pt states she feels loopy   • Kdc:Metronidazole+Tartrazine    • Keflex Rash     Pt states she gets a rash with this medication  Tolerates ceftriaxone   • Levofloxacin Unspecified     Leg muscle cramps   • Penicillins        Patient's PMH, SocHx, SurgHx, FamHx, Drug allergies and medications reviewed.     Objective:   /78 (Patient Position: Sitting)   Pulse 78   Temp 36.7 °C (98 °F) (Temporal)   Resp " "16   Ht 1.651 m (5' 5\")   Wt 118.4 kg (261 lb)   SpO2 96%   BMI 43.43 kg/m²   Physical Exam  Vitals signs reviewed.   Constitutional:       Appearance: She is well-developed.   HENT:      Head: Normocephalic.      Right Ear: Tympanic membrane and ear canal normal. No middle ear effusion. Tympanic membrane is not perforated or erythematous.      Left Ear: Tympanic membrane and ear canal normal.  No middle ear effusion. Tympanic membrane is not perforated or erythematous.      Nose: Nose normal. No rhinorrhea.      Right Sinus: No maxillary sinus tenderness or frontal sinus tenderness.      Left Sinus: No maxillary sinus tenderness or frontal sinus tenderness.      Mouth/Throat:      Lips: Pink.      Mouth: Mucous membranes are moist.      Pharynx: Oropharynx is clear. Uvula midline. No oropharyngeal exudate, posterior oropharyngeal erythema or uvula swelling.      Tonsils: No tonsillar exudate.   Eyes:      General: Lids are normal.      Extraocular Movements: Extraocular movements intact.      Conjunctiva/sclera: Conjunctivae normal.      Pupils: Pupils are equal, round, and reactive to light.   Neck:      Musculoskeletal: Normal range of motion.      Thyroid: No thyromegaly.   Cardiovascular:      Rate and Rhythm: Normal rate and regular rhythm.      Heart sounds: Normal heart sounds.   Pulmonary:      Effort: Pulmonary effort is normal. No bradypnea, accessory muscle usage, prolonged expiration or respiratory distress.      Breath sounds: Examination of the right-lower field reveals wheezing. Examination of the left-lower field reveals decreased breath sounds. Decreased breath sounds and wheezing present.   Abdominal:      General: Bowel sounds are normal.      Palpations: Abdomen is soft.      Tenderness: There is no abdominal tenderness.   Musculoskeletal:      Right lower leg: No edema.      Left lower leg: No edema.   Lymphadenopathy:      Head:      Right side of head: No submandibular or tonsillar " "adenopathy.      Left side of head: No submandibular or tonsillar adenopathy.   Skin:     General: Skin is warm and dry.      Findings: No rash.   Neurological:      Mental Status: She is alert and oriented to person, place, and time.   Psychiatric:         Mood and Affect: Mood normal.         Speech: Speech normal.         Behavior: Behavior normal. Behavior is cooperative.         Thought Content: Thought content normal.         Judgment: Judgment normal.           Assessment/Plan:   Assessment    1. Cough  DX-CHEST-2 VIEWS   2. History of asthma     3. Suspected COVID-19 virus infection     4. Nausea  ondansetron (ZOFRAN) syringe/vial injection 4 mg    ondansetron (ZOFRAN ODT) 4 MG TABLET DISPERSIBLE     Xray:\"No acute cardiopulmonary process is identified.\"  Continue with albuterol inhaler as prior. Advised to keep appointment tomorrow for COVID-19 testing.    Patient advised to self quarantine at home and given phone number to contact the [facility] for screening and potential COVID-19 testing. We discussed 14 days of self quarantine and patient given handout on self isolation. Patient encouraged to increase clear liquid intake  Strict ER precautions discussed    Differential diagnosis, natural history, supportive care, and indications for immediate follow-up discussed.     **Please note that all invasive procedures during this visit were performed by myself and/or the Medical Assistant under the supervision of the PA or MD in office**      "

## 2020-05-15 NOTE — TELEPHONE ENCOUNTER
1. Caller Name: Kristin Balderrama                        Call Back Number: [phone]  [facility] PCP or Specialty Provider: Yes         2.  Does patient have any active symptoms of respiratory illness? Yes, the patient reports the following respiratory symptoms: cough, shortness of breath or difficulty breathing, fever of at least 100.4°F (38°C) or greater and chills, diarrhea, ears plugged, changes in tasting and smelling.  She says these symptoms started about 3 days ago.     3.  Does patient have any comoribidities? DM and ESRD, a-fib, asthma.     4.  Has the patient traveled in the last 14 days OR had any known contact with someone who is suspected or confirmed to have COVID-19?  No.    5. Disposition: Advised to go to     Note routed to [facility] Provider: FYI only.

## 2020-05-18 ENCOUNTER — PATIENT OUTREACH (OUTPATIENT)
Dept: HEALTH INFORMATION MANAGEMENT | Facility: OTHER | Age: 30
End: 2020-05-18

## 2020-05-18 ENCOUNTER — HOSPITAL ENCOUNTER (OUTPATIENT)
Dept: RADIOLOGY | Facility: MEDICAL CENTER | Age: 30
End: 2020-05-18
Attending: PHYSICIAN ASSISTANT
Payer: MEDICARE

## 2020-05-18 DIAGNOSIS — M25.552 LEFT HIP PAIN: ICD-10-CM

## 2020-05-18 PROCEDURE — 73700 CT LOWER EXTREMITY W/O DYE: CPT | Mod: LT

## 2020-05-18 NOTE — PROGRESS NOTES
CHW made outgoing attempt and left message with this patient regarding her recent visit to [facility]. Patient already has been in contact with and was tested through the [facility] for COVID, results are still pending. This worker included information on the CDC's self-isolation protocol, as well as return precautions if she continues to be sick. CHW also gave this patient the [facility] nurse triage line and encouraged her to reach-out with any questions, or to return to the ED with any new or worsening symptoms.

## 2020-05-19 ENCOUNTER — HOSPITAL ENCOUNTER (OUTPATIENT)
Facility: MEDICAL CENTER | Age: 30
End: 2020-05-21
Attending: EMERGENCY MEDICINE | Admitting: INTERNAL MEDICINE
Payer: MEDICARE

## 2020-05-19 ENCOUNTER — APPOINTMENT (OUTPATIENT)
Dept: RADIOLOGY | Facility: MEDICAL CENTER | Age: 30
End: 2020-05-19
Attending: STUDENT IN AN ORGANIZED HEALTH CARE EDUCATION/TRAINING PROGRAM
Payer: MEDICARE

## 2020-05-19 ENCOUNTER — APPOINTMENT (OUTPATIENT)
Dept: RADIOLOGY | Facility: MEDICAL CENTER | Age: 30
End: 2020-05-19
Attending: PHYSICIAN ASSISTANT
Payer: MEDICARE

## 2020-05-19 ENCOUNTER — APPOINTMENT (OUTPATIENT)
Dept: RADIOLOGY | Facility: MEDICAL CENTER | Age: 30
End: 2020-05-19
Attending: EMERGENCY MEDICINE
Payer: MEDICARE

## 2020-05-19 DIAGNOSIS — M25.552 HIP PAIN, LEFT: ICD-10-CM

## 2020-05-19 DIAGNOSIS — J18.9 COMMUNITY ACQUIRED PNEUMONIA, UNSPECIFIED LATERALITY: ICD-10-CM

## 2020-05-19 DIAGNOSIS — R29.898 ARM WEAKNESS: ICD-10-CM

## 2020-05-19 DIAGNOSIS — S70.02XA CONTUSION OF LEFT HIP, INITIAL ENCOUNTER: ICD-10-CM

## 2020-05-19 DIAGNOSIS — R29.898 LEG WEAKNESS, BILATERAL: ICD-10-CM

## 2020-05-19 PROBLEM — R53.1 GENERALIZED WEAKNESS: Status: ACTIVE | Noted: 2019-09-30

## 2020-05-19 LAB
ALBUMIN SERPL BCP-MCNC: 4.1 G/DL (ref 3.2–4.9)
ALBUMIN/GLOB SERPL: 2.3 G/DL
ALP SERPL-CCNC: 47 U/L (ref 30–99)
ALT SERPL-CCNC: 28 U/L (ref 2–50)
ANION GAP SERPL CALC-SCNC: 12 MMOL/L (ref 7–16)
APPEARANCE UR: CLEAR
APTT PPP: 24.1 SEC (ref 24.7–36)
AST SERPL-CCNC: 7 U/L (ref 12–45)
BASOPHILS # BLD AUTO: 0.2 % (ref 0–1.8)
BASOPHILS # BLD: 0.01 K/UL (ref 0–0.12)
BILIRUB SERPL-MCNC: 0.2 MG/DL (ref 0.1–1.5)
BILIRUB UR QL STRIP.AUTO: NEGATIVE
BUN SERPL-MCNC: 17 MG/DL (ref 8–22)
CALCIUM SERPL-MCNC: 9.3 MG/DL (ref 8.5–10.5)
CHLORIDE SERPL-SCNC: 111 MMOL/L (ref 96–112)
CK SERPL-CCNC: 14 U/L (ref 0–154)
CO2 SERPL-SCNC: 16 MMOL/L (ref 20–33)
COLOR UR: YELLOW
COVID ORDER STATUS COVID19: NORMAL
CREAT SERPL-MCNC: 0.83 MG/DL (ref 0.5–1.4)
CRP SERPL HS-MCNC: 0.07 MG/DL (ref 0–0.75)
D DIMER PPP IA.FEU-MCNC: <0.27 UG/ML (FEU) (ref 0–0.5)
EKG IMPRESSION: NORMAL
EKG IMPRESSION: NORMAL
EOSINOPHIL # BLD AUTO: 0.04 K/UL (ref 0–0.51)
EOSINOPHIL NFR BLD: 0.8 % (ref 0–6.9)
ERYTHROCYTE [DISTWIDTH] IN BLOOD BY AUTOMATED COUNT: 53.5 FL (ref 35.9–50)
ERYTHROCYTE [SEDIMENTATION RATE] IN BLOOD BY WESTERGREN METHOD: 5 MM/HOUR (ref 0–20)
FERRITIN SERPL-MCNC: 74.9 NG/ML (ref 10–291)
GLOBULIN SER CALC-MCNC: 1.8 G/DL (ref 1.9–3.5)
GLUCOSE BLD-MCNC: 108 MG/DL (ref 65–99)
GLUCOSE BLD-MCNC: 119 MG/DL (ref 65–99)
GLUCOSE BLD-MCNC: 79 MG/DL (ref 65–99)
GLUCOSE BLD-MCNC: 84 MG/DL (ref 65–99)
GLUCOSE SERPL-MCNC: 88 MG/DL (ref 65–99)
GLUCOSE UR STRIP.AUTO-MCNC: NEGATIVE MG/DL
HCG UR QL: NEGATIVE
HCT VFR BLD AUTO: 40.6 % (ref 37–47)
HGB BLD-MCNC: 12.6 G/DL (ref 12–16)
IMM GRANULOCYTES # BLD AUTO: 0.02 K/UL (ref 0–0.11)
IMM GRANULOCYTES NFR BLD AUTO: 0.4 % (ref 0–0.9)
INR PPP: 0.97 (ref 0.87–1.13)
KETONES UR STRIP.AUTO-MCNC: NEGATIVE MG/DL
LACTATE BLD-SCNC: 1.4 MMOL/L (ref 0.5–2)
LDH SERPL L TO P-CCNC: 233 U/L (ref 107–266)
LEUKOCYTE ESTERASE UR QL STRIP.AUTO: NEGATIVE
LYMPHOCYTES # BLD AUTO: 1.28 K/UL (ref 1–4.8)
LYMPHOCYTES NFR BLD: 26.1 % (ref 22–41)
MAGNESIUM SERPL-MCNC: 1.9 MG/DL (ref 1.5–2.5)
MCH RBC QN AUTO: 30.6 PG (ref 27–33)
MCHC RBC AUTO-ENTMCNC: 31 G/DL (ref 33.6–35)
MCV RBC AUTO: 98.5 FL (ref 81.4–97.8)
MICRO URNS: NORMAL
MONOCYTES # BLD AUTO: 0.49 K/UL (ref 0–0.85)
MONOCYTES NFR BLD AUTO: 10 % (ref 0–13.4)
NEUTROPHILS # BLD AUTO: 3.06 K/UL (ref 2–7.15)
NEUTROPHILS NFR BLD: 62.5 % (ref 44–72)
NITRITE UR QL STRIP.AUTO: NEGATIVE
NRBC # BLD AUTO: 0 K/UL
NRBC BLD-RTO: 0 /100 WBC
NT-PROBNP SERPL IA-MCNC: 116 PG/ML (ref 0–125)
PH UR STRIP.AUTO: 6.5 [PH] (ref 5–8)
PHOSPHATE SERPL-MCNC: 3.5 MG/DL (ref 2.5–4.5)
PLATELET # BLD AUTO: 152 K/UL (ref 164–446)
PMV BLD AUTO: 11.4 FL (ref 9–12.9)
POTASSIUM SERPL-SCNC: 3.7 MMOL/L (ref 3.6–5.5)
PROCALCITONIN SERPL-MCNC: <0.05 NG/ML
PROT SERPL-MCNC: 5.9 G/DL (ref 6–8.2)
PROT UR QL STRIP: NEGATIVE MG/DL
PROTHROMBIN TIME: 13.1 SEC (ref 12–14.6)
RBC # BLD AUTO: 4.12 M/UL (ref 4.2–5.4)
RBC UR QL AUTO: NEGATIVE
SARS-COV-2 RNA RESP QL NAA+PROBE: NOTDETECTED
SODIUM SERPL-SCNC: 139 MMOL/L (ref 135–145)
SP GR UR STRIP.AUTO: 1.02
SPECIMEN SOURCE: NORMAL
T4 FREE SERPL-MCNC: 0.88 NG/DL (ref 0.93–1.7)
TROPONIN T SERPL-MCNC: <6 NG/L (ref 6–19)
TSH SERPL DL<=0.005 MIU/L-ACNC: 1.23 UIU/ML (ref 0.38–5.33)
TSH SERPL DL<=0.005 MIU/L-ACNC: 1.46 UIU/ML (ref 0.38–5.33)
UROBILINOGEN UR STRIP.AUTO-MCNC: 1 MG/DL
WBC # BLD AUTO: 4.9 K/UL (ref 4.8–10.8)

## 2020-05-19 PROCEDURE — 85379 FIBRIN DEGRADATION QUANT: CPT

## 2020-05-19 PROCEDURE — 83615 LACTATE (LD) (LDH) ENZYME: CPT

## 2020-05-19 PROCEDURE — 83880 ASSAY OF NATRIURETIC PEPTIDE: CPT

## 2020-05-19 PROCEDURE — 83605 ASSAY OF LACTIC ACID: CPT

## 2020-05-19 PROCEDURE — G0378 HOSPITAL OBSERVATION PER HR: HCPCS

## 2020-05-19 PROCEDURE — 94640 AIRWAY INHALATION TREATMENT: CPT

## 2020-05-19 PROCEDURE — 82550 ASSAY OF CK (CPK): CPT

## 2020-05-19 PROCEDURE — 84100 ASSAY OF PHOSPHORUS: CPT

## 2020-05-19 PROCEDURE — 93010 ELECTROCARDIOGRAM REPORT: CPT | Performed by: INTERNAL MEDICINE

## 2020-05-19 PROCEDURE — 85025 COMPLETE CBC W/AUTO DIFF WBC: CPT

## 2020-05-19 PROCEDURE — 81025 URINE PREGNANCY TEST: CPT

## 2020-05-19 PROCEDURE — 700102 HCHG RX REV CODE 250 W/ 637 OVERRIDE(OP): Performed by: STUDENT IN AN ORGANIZED HEALTH CARE EDUCATION/TRAINING PROGRAM

## 2020-05-19 PROCEDURE — 70470 CT HEAD/BRAIN W/O & W/DYE: CPT

## 2020-05-19 PROCEDURE — 93005 ELECTROCARDIOGRAM TRACING: CPT | Performed by: STUDENT IN AN ORGANIZED HEALTH CARE EDUCATION/TRAINING PROGRAM

## 2020-05-19 PROCEDURE — 700111 HCHG RX REV CODE 636 W/ 250 OVERRIDE (IP): Performed by: STUDENT IN AN ORGANIZED HEALTH CARE EDUCATION/TRAINING PROGRAM

## 2020-05-19 PROCEDURE — 700117 HCHG RX CONTRAST REV CODE 255: Performed by: STUDENT IN AN ORGANIZED HEALTH CARE EDUCATION/TRAINING PROGRAM

## 2020-05-19 PROCEDURE — 73700 CT LOWER EXTREMITY W/O DYE: CPT | Mod: LT

## 2020-05-19 PROCEDURE — 94760 N-INVAS EAR/PLS OXIMETRY 1: CPT

## 2020-05-19 PROCEDURE — 81003 URINALYSIS AUTO W/O SCOPE: CPT

## 2020-05-19 PROCEDURE — 85652 RBC SED RATE AUTOMATED: CPT

## 2020-05-19 PROCEDURE — 700102 HCHG RX REV CODE 250 W/ 637 OVERRIDE(OP): Performed by: INTERNAL MEDICINE

## 2020-05-19 PROCEDURE — 36415 COLL VENOUS BLD VENIPUNCTURE: CPT

## 2020-05-19 PROCEDURE — A9270 NON-COVERED ITEM OR SERVICE: HCPCS | Performed by: STUDENT IN AN ORGANIZED HEALTH CARE EDUCATION/TRAINING PROGRAM

## 2020-05-19 PROCEDURE — 86140 C-REACTIVE PROTEIN: CPT

## 2020-05-19 PROCEDURE — A9270 NON-COVERED ITEM OR SERVICE: HCPCS | Performed by: INTERNAL MEDICINE

## 2020-05-19 PROCEDURE — 85610 PROTHROMBIN TIME: CPT

## 2020-05-19 PROCEDURE — 99285 EMERGENCY DEPT VISIT HI MDM: CPT

## 2020-05-19 PROCEDURE — 83735 ASSAY OF MAGNESIUM: CPT

## 2020-05-19 PROCEDURE — 83520 IMMUNOASSAY QUANT NOS NONAB: CPT

## 2020-05-19 PROCEDURE — 99220 PR INITIAL OBSERVATION CARE,LEVL III: CPT | Mod: GC | Performed by: INTERNAL MEDICINE

## 2020-05-19 PROCEDURE — 84439 ASSAY OF FREE THYROXINE: CPT

## 2020-05-19 PROCEDURE — 82962 GLUCOSE BLOOD TEST: CPT | Mod: 91

## 2020-05-19 PROCEDURE — 84145 PROCALCITONIN (PCT): CPT

## 2020-05-19 PROCEDURE — 93005 ELECTROCARDIOGRAM TRACING: CPT | Performed by: EMERGENCY MEDICINE

## 2020-05-19 PROCEDURE — U0004 COV-19 TEST NON-CDC HGH THRU: HCPCS

## 2020-05-19 PROCEDURE — 80053 COMPREHEN METABOLIC PANEL: CPT

## 2020-05-19 PROCEDURE — 87040 BLOOD CULTURE FOR BACTERIA: CPT

## 2020-05-19 PROCEDURE — 700101 HCHG RX REV CODE 250: Performed by: STUDENT IN AN ORGANIZED HEALTH CARE EDUCATION/TRAINING PROGRAM

## 2020-05-19 PROCEDURE — 71045 X-RAY EXAM CHEST 1 VIEW: CPT

## 2020-05-19 PROCEDURE — 51701 INSERT BLADDER CATHETER: CPT

## 2020-05-19 PROCEDURE — C9803 HOPD COVID-19 SPEC COLLECT: HCPCS | Performed by: EMERGENCY MEDICINE

## 2020-05-19 PROCEDURE — 82728 ASSAY OF FERRITIN: CPT

## 2020-05-19 PROCEDURE — 85730 THROMBOPLASTIN TIME PARTIAL: CPT

## 2020-05-19 PROCEDURE — 84443 ASSAY THYROID STIM HORMONE: CPT

## 2020-05-19 PROCEDURE — 84484 ASSAY OF TROPONIN QUANT: CPT

## 2020-05-19 RX ORDER — PREDNISONE 20 MG/1
20 TABLET ORAL DAILY
Status: DISCONTINUED | OUTPATIENT
Start: 2020-05-19 | End: 2020-05-21 | Stop reason: HOSPADM

## 2020-05-19 RX ORDER — BISACODYL 10 MG
10 SUPPOSITORY, RECTAL RECTAL
Status: DISCONTINUED | OUTPATIENT
Start: 2020-05-19 | End: 2020-05-21 | Stop reason: HOSPADM

## 2020-05-19 RX ORDER — DEXTROSE MONOHYDRATE 25 G/50ML
50 INJECTION, SOLUTION INTRAVENOUS
Status: DISCONTINUED | OUTPATIENT
Start: 2020-05-19 | End: 2020-05-21 | Stop reason: HOSPADM

## 2020-05-19 RX ORDER — PHENOL 1.4 %
10 AEROSOL, SPRAY (ML) MUCOUS MEMBRANE EVERY EVENING
Status: DISCONTINUED | OUTPATIENT
Start: 2020-05-19 | End: 2020-05-19

## 2020-05-19 RX ORDER — TRAZODONE HYDROCHLORIDE 100 MG/1
50 TABLET ORAL EVERY EVENING
Status: DISCONTINUED | OUTPATIENT
Start: 2020-05-19 | End: 2020-05-21 | Stop reason: HOSPADM

## 2020-05-19 RX ORDER — BUSPIRONE HYDROCHLORIDE 10 MG/1
10 TABLET ORAL 2 TIMES DAILY
Status: DISCONTINUED | OUTPATIENT
Start: 2020-05-19 | End: 2020-05-21 | Stop reason: HOSPADM

## 2020-05-19 RX ORDER — ZIPRASIDONE HYDROCHLORIDE 40 MG/1
40 CAPSULE ORAL 2 TIMES DAILY
Status: DISCONTINUED | OUTPATIENT
Start: 2020-05-19 | End: 2020-05-21 | Stop reason: HOSPADM

## 2020-05-19 RX ORDER — OXCARBAZEPINE 150 MG/1
150 TABLET, FILM COATED ORAL 2 TIMES DAILY
Status: DISCONTINUED | OUTPATIENT
Start: 2020-05-19 | End: 2020-05-21 | Stop reason: HOSPADM

## 2020-05-19 RX ORDER — AMOXICILLIN 250 MG
2 CAPSULE ORAL 2 TIMES DAILY
Status: DISCONTINUED | OUTPATIENT
Start: 2020-05-19 | End: 2020-05-21 | Stop reason: HOSPADM

## 2020-05-19 RX ORDER — FLUOXETINE HYDROCHLORIDE 20 MG/1
40 CAPSULE ORAL
Status: DISCONTINUED | OUTPATIENT
Start: 2020-05-19 | End: 2020-05-21 | Stop reason: HOSPADM

## 2020-05-19 RX ORDER — BUDESONIDE AND FORMOTEROL FUMARATE DIHYDRATE 160; 4.5 UG/1; UG/1
2 AEROSOL RESPIRATORY (INHALATION)
Status: DISCONTINUED | OUTPATIENT
Start: 2020-05-19 | End: 2020-05-21 | Stop reason: HOSPADM

## 2020-05-19 RX ORDER — MYCOPHENOLATE MOFETIL 250 MG/1
1000 CAPSULE ORAL 2 TIMES DAILY
Status: DISCONTINUED | OUTPATIENT
Start: 2020-05-19 | End: 2020-05-21 | Stop reason: HOSPADM

## 2020-05-19 RX ORDER — HEPARIN SODIUM 5000 [USP'U]/ML
5000 INJECTION, SOLUTION INTRAVENOUS; SUBCUTANEOUS EVERY 8 HOURS
Status: DISCONTINUED | OUTPATIENT
Start: 2020-05-19 | End: 2020-05-19

## 2020-05-19 RX ORDER — MUPIROCIN CALCIUM 20 MG/G
1 CREAM TOPICAL 2 TIMES DAILY
Status: DISCONTINUED | OUTPATIENT
Start: 2020-05-19 | End: 2020-05-19

## 2020-05-19 RX ORDER — VITAMIN B COMPLEX
1000 TABLET ORAL DAILY
Status: DISCONTINUED | OUTPATIENT
Start: 2020-05-19 | End: 2020-05-21 | Stop reason: HOSPADM

## 2020-05-19 RX ORDER — INSULIN GLARGINE 100 [IU]/ML
7 INJECTION, SOLUTION SUBCUTANEOUS EVERY EVENING
Status: DISCONTINUED | OUTPATIENT
Start: 2020-05-19 | End: 2020-05-21 | Stop reason: HOSPADM

## 2020-05-19 RX ORDER — BUDESONIDE AND FORMOTEROL FUMARATE DIHYDRATE 160; 4.5 UG/1; UG/1
2 AEROSOL RESPIRATORY (INHALATION) 2 TIMES DAILY
Status: DISCONTINUED | OUTPATIENT
Start: 2020-05-19 | End: 2020-05-19

## 2020-05-19 RX ORDER — LABETALOL HYDROCHLORIDE 5 MG/ML
10 INJECTION, SOLUTION INTRAVENOUS EVERY 4 HOURS PRN
Status: DISCONTINUED | OUTPATIENT
Start: 2020-05-19 | End: 2020-05-21 | Stop reason: HOSPADM

## 2020-05-19 RX ORDER — IPRATROPIUM BROMIDE AND ALBUTEROL SULFATE 2.5; .5 MG/3ML; MG/3ML
3 SOLUTION RESPIRATORY (INHALATION) EVERY 6 HOURS PRN
Status: DISCONTINUED | OUTPATIENT
Start: 2020-05-19 | End: 2020-05-21 | Stop reason: HOSPADM

## 2020-05-19 RX ORDER — PREGABALIN 100 MG/1
300 CAPSULE ORAL 2 TIMES DAILY
Status: DISCONTINUED | OUTPATIENT
Start: 2020-05-19 | End: 2020-05-21 | Stop reason: HOSPADM

## 2020-05-19 RX ORDER — MODAFINIL 100 MG/1
200 TABLET ORAL EVERY MORNING
Status: DISCONTINUED | OUTPATIENT
Start: 2020-05-19 | End: 2020-05-19

## 2020-05-19 RX ORDER — INSULIN GLARGINE 100 [IU]/ML
15 INJECTION, SOLUTION SUBCUTANEOUS EVERY EVENING
Status: DISCONTINUED | OUTPATIENT
Start: 2020-05-19 | End: 2020-05-19

## 2020-05-19 RX ORDER — POLYETHYLENE GLYCOL 3350 17 G/17G
1 POWDER, FOR SOLUTION ORAL
Status: DISCONTINUED | OUTPATIENT
Start: 2020-05-19 | End: 2020-05-21 | Stop reason: HOSPADM

## 2020-05-19 RX ORDER — ONDANSETRON 4 MG/1
4 TABLET, ORALLY DISINTEGRATING ORAL EVERY 6 HOURS PRN
Status: DISCONTINUED | OUTPATIENT
Start: 2020-05-19 | End: 2020-05-21 | Stop reason: HOSPADM

## 2020-05-19 RX ORDER — DOXYCYCLINE HYCLATE 100 MG
100 TABLET ORAL 2 TIMES DAILY
COMMUNITY
Start: 2020-05-16 | End: 2020-06-22

## 2020-05-19 RX ORDER — ACETAZOLAMIDE 250 MG/1
500 TABLET ORAL 2 TIMES DAILY
Status: DISCONTINUED | OUTPATIENT
Start: 2020-05-19 | End: 2020-05-21

## 2020-05-19 RX ORDER — ACETAMINOPHEN 325 MG/1
650 TABLET ORAL EVERY 6 HOURS PRN
Status: DISCONTINUED | OUTPATIENT
Start: 2020-05-19 | End: 2020-05-21 | Stop reason: HOSPADM

## 2020-05-19 RX ORDER — LEVOTHYROXINE SODIUM 0.1 MG/1
100 TABLET ORAL
Status: DISCONTINUED | OUTPATIENT
Start: 2020-05-19 | End: 2020-05-21 | Stop reason: HOSPADM

## 2020-05-19 RX ORDER — AZITHROMYCIN 250 MG/1
250-500 TABLET, FILM COATED ORAL DAILY
COMMUNITY
Start: 2020-05-16 | End: 2020-06-22

## 2020-05-19 RX ORDER — POTASSIUM CHLORIDE 20 MEQ/1
20 TABLET, EXTENDED RELEASE ORAL 2 TIMES DAILY
Status: DISCONTINUED | OUTPATIENT
Start: 2020-05-19 | End: 2020-05-21 | Stop reason: HOSPADM

## 2020-05-19 RX ORDER — ACETAZOLAMIDE 250 MG/1
TABLET ORAL 2 TIMES DAILY
COMMUNITY
End: 2020-08-15

## 2020-05-19 RX ORDER — IPRATROPIUM BROMIDE AND ALBUTEROL SULFATE 2.5; .5 MG/3ML; MG/3ML
3 SOLUTION RESPIRATORY (INHALATION)
Status: DISCONTINUED | OUTPATIENT
Start: 2020-05-19 | End: 2020-05-21 | Stop reason: HOSPADM

## 2020-05-19 RX ORDER — ALBUTEROL SULFATE 90 UG/1
2 AEROSOL, METERED RESPIRATORY (INHALATION) EVERY 6 HOURS PRN
Status: DISCONTINUED | OUTPATIENT
Start: 2020-05-19 | End: 2020-05-21 | Stop reason: HOSPADM

## 2020-05-19 RX ORDER — FUROSEMIDE 40 MG/1
80 TABLET ORAL
Status: DISCONTINUED | OUTPATIENT
Start: 2020-05-19 | End: 2020-05-21 | Stop reason: HOSPADM

## 2020-05-19 RX ADMIN — BUSPIRONE HYDROCHLORIDE 10 MG: 10 TABLET ORAL at 08:45

## 2020-05-19 RX ADMIN — POTASSIUM CHLORIDE 20 MEQ: 1500 TABLET, EXTENDED RELEASE ORAL at 17:13

## 2020-05-19 RX ADMIN — POLYETHYLENE GLYCOL 3350 1 PACKET: 17 POWDER, FOR SOLUTION ORAL at 17:45

## 2020-05-19 RX ADMIN — BUSPIRONE HYDROCHLORIDE 10 MG: 10 TABLET ORAL at 17:11

## 2020-05-19 RX ADMIN — IPRATROPIUM BROMIDE AND ALBUTEROL SULFATE 3 ML: .5; 3 SOLUTION RESPIRATORY (INHALATION) at 14:34

## 2020-05-19 RX ADMIN — TRAZODONE HYDROCHLORIDE 50 MG: 100 TABLET ORAL at 17:12

## 2020-05-19 RX ADMIN — OXCARBAZEPINE 150 MG: 150 TABLET, FILM COATED ORAL at 17:15

## 2020-05-19 RX ADMIN — PREGABALIN 300 MG: 100 CAPSULE ORAL at 17:11

## 2020-05-19 RX ADMIN — DOCUSATE SODIUM 50 MG AND SENNOSIDES 8.6 MG 2 TABLET: 8.6; 5 TABLET, FILM COATED ORAL at 09:08

## 2020-05-19 RX ADMIN — ACETAMINOPHEN 650 MG: 325 TABLET, FILM COATED ORAL at 23:06

## 2020-05-19 RX ADMIN — MELATONIN 1000 UNITS: at 08:45

## 2020-05-19 RX ADMIN — LEVOTHYROXINE SODIUM 100 MCG: 100 TABLET ORAL at 08:45

## 2020-05-19 RX ADMIN — MYCOPHENOLATE MOFETIL 1000 MG: 250 CAPSULE ORAL at 17:14

## 2020-05-19 RX ADMIN — MUPIROCIN 1 G: 2 CREAM TOPICAL at 17:18

## 2020-05-19 RX ADMIN — BUDESONIDE AND FORMOTEROL FUMARATE DIHYDRATE 2 PUFF: 160; 4.5 AEROSOL RESPIRATORY (INHALATION) at 19:56

## 2020-05-19 RX ADMIN — ACETAMINOPHEN 650 MG: 325 TABLET, FILM COATED ORAL at 15:29

## 2020-05-19 RX ADMIN — IOHEXOL 80 ML: 350 INJECTION, SOLUTION INTRAVENOUS at 16:45

## 2020-05-19 RX ADMIN — FLUOXETINE HYDROCHLORIDE 40 MG: 20 CAPSULE ORAL at 08:46

## 2020-05-19 RX ADMIN — IVABRADINE 7.5 MG: 5 TABLET, FILM COATED ORAL at 17:17

## 2020-05-19 RX ADMIN — ENOXAPARIN SODIUM 40 MG: 100 INJECTION SUBCUTANEOUS at 08:43

## 2020-05-19 RX ADMIN — IPRATROPIUM BROMIDE AND ALBUTEROL SULFATE 3 ML: .5; 3 SOLUTION RESPIRATORY (INHALATION) at 19:56

## 2020-05-19 RX ADMIN — POTASSIUM CHLORIDE 20 MEQ: 1500 TABLET, EXTENDED RELEASE ORAL at 08:47

## 2020-05-19 RX ADMIN — PREDNISONE 20 MG: 20 TABLET ORAL at 12:15

## 2020-05-19 RX ADMIN — ACETAZOLAMIDE 500 MG: 250 TABLET ORAL at 17:15

## 2020-05-19 RX ADMIN — INSULIN GLARGINE 7 UNITS: 100 INJECTION, SOLUTION SUBCUTANEOUS at 20:20

## 2020-05-19 RX ADMIN — ACETAMINOPHEN 650 MG: 325 TABLET, FILM COATED ORAL at 09:34

## 2020-05-19 RX ADMIN — PREGABALIN 300 MG: 100 CAPSULE ORAL at 08:43

## 2020-05-19 RX ADMIN — ZIPRASIDONE HYDROCHLORIDE 40 MG: 40 CAPSULE ORAL at 17:15

## 2020-05-19 ASSESSMENT — ENCOUNTER SYMPTOMS
STRIDOR: 0
DIAPHORESIS: 1
PHOTOPHOBIA: 0
NECK PAIN: 0
NAUSEA: 1
SPUTUM PRODUCTION: 0
TREMORS: 0
SORE THROAT: 0
EYE PAIN: 0
FEVER: 0
NERVOUS/ANXIOUS: 1
SENSORY CHANGE: 0
SPEECH CHANGE: 0
TINGLING: 0
SHORTNESS OF BREATH: 0
INSOMNIA: 0
DIAPHORESIS: 0
HEADACHES: 1
DOUBLE VISION: 0
WEIGHT LOSS: 0
WEAKNESS: 1
VOMITING: 0
COUGH: 1
BLURRED VISION: 1
WHEEZING: 0
BLOOD IN STOOL: 0
BACK PAIN: 0
CONSTIPATION: 1
CHILLS: 0
HEMOPTYSIS: 0
LOSS OF CONSCIOUSNESS: 0
HEARTBURN: 0
EYE PAIN: 1
SINUS PAIN: 0
DIZZINESS: 0
SEIZURES: 0
ORTHOPNEA: 0
PALPITATIONS: 0
SPUTUM PRODUCTION: 1
ABDOMINAL PAIN: 0
EYE DISCHARGE: 0
SHORTNESS OF BREATH: 1
DIARRHEA: 0
EYE REDNESS: 0

## 2020-05-19 ASSESSMENT — COGNITIVE AND FUNCTIONAL STATUS - GENERAL
MOBILITY SCORE: 6
MOVING TO AND FROM BED TO CHAIR: UNABLE
TURNING FROM BACK TO SIDE WHILE IN FLAT BAD: UNABLE
DAILY ACTIVITIY SCORE: 6
DRESSING REGULAR LOWER BODY CLOTHING: TOTAL
SUGGESTED CMS G CODE MODIFIER MOBILITY: CN
HELP NEEDED FOR BATHING: TOTAL
TOILETING: TOTAL
WALKING IN HOSPITAL ROOM: TOTAL
PERSONAL GROOMING: TOTAL
SUGGESTED CMS G CODE MODIFIER DAILY ACTIVITY: CN
MOVING FROM LYING ON BACK TO SITTING ON SIDE OF FLAT BED: UNABLE
EATING MEALS: TOTAL
CLIMB 3 TO 5 STEPS WITH RAILING: TOTAL
STANDING UP FROM CHAIR USING ARMS: TOTAL
DRESSING REGULAR UPPER BODY CLOTHING: TOTAL

## 2020-05-19 ASSESSMENT — COPD QUESTIONNAIRES
HAVE YOU SMOKED AT LEAST 100 CIGARETTES IN YOUR ENTIRE LIFE: NO/DON'T KNOW
DO YOU EVER COUGH UP ANY MUCUS OR PHLEGM?: NO/ONLY WITH OCCASIONAL COLDS OR INFECTIONS
DURING THE PAST 4 WEEKS HOW MUCH DID YOU FEEL SHORT OF BREATH: NONE/LITTLE OF THE TIME
COPD SCREENING SCORE: 0

## 2020-05-19 ASSESSMENT — PATIENT HEALTH QUESTIONNAIRE - PHQ9
4. FEELING TIRED OR HAVING LITTLE ENERGY: SEVERAL DAYS
1. LITTLE INTEREST OR PLEASURE IN DOING THINGS: NOT AT ALL
3. TROUBLE FALLING OR STAYING ASLEEP OR SLEEPING TOO MUCH: SEVERAL DAYS
9. THOUGHTS THAT YOU WOULD BE BETTER OFF DEAD, OR OF HURTING YOURSELF: NOT AT ALL
SUM OF ALL RESPONSES TO PHQ QUESTIONS 1-9: 7
SUM OF ALL RESPONSES TO PHQ9 QUESTIONS 1 AND 2: 3
2. FEELING DOWN, DEPRESSED, IRRITABLE, OR HOPELESS: NEARLY EVERY DAY
6. FEELING BAD ABOUT YOURSELF - OR THAT YOU ARE A FAILURE OR HAVE LET YOURSELF OR YOUR FAMILY DOWN: NOT AL ALL
7. TROUBLE CONCENTRATING ON THINGS, SUCH AS READING THE NEWSPAPER OR WATCHING TELEVISION: SEVERAL DAYS
8. MOVING OR SPEAKING SO SLOWLY THAT OTHER PEOPLE COULD HAVE NOTICED. OR THE OPPOSITE, BEING SO FIGETY OR RESTLESS THAT YOU HAVE BEEN MOVING AROUND A LOT MORE THAN USUAL: NOT AT ALL
5. POOR APPETITE OR OVEREATING: SEVERAL DAYS

## 2020-05-19 ASSESSMENT — LIFESTYLE VARIABLES
HAVE YOU EVER FELT YOU SHOULD CUT DOWN ON YOUR DRINKING: NO
TOTAL SCORE: 0
EVER HAD A DRINK FIRST THING IN THE MORNING TO STEADY YOUR NERVES TO GET RID OF A HANGOVER: NO
EVER_SMOKED: NEVER
EVER FELT BAD OR GUILTY ABOUT YOUR DRINKING: NO
HAVE PEOPLE ANNOYED YOU BY CRITICIZING YOUR DRINKING: NO
TOTAL SCORE: 0
HOW MANY TIMES IN THE PAST YEAR HAVE YOU HAD 5 OR MORE DRINKS IN A DAY: 0
SUBSTANCE_ABUSE: 0
TOTAL SCORE: 0
ALCOHOL_USE: NO
DOES PATIENT WANT TO STOP DRINKING: NO
AVERAGE NUMBER OF DAYS PER WEEK YOU HAVE A DRINK CONTAINING ALCOHOL: 0
ON A TYPICAL DAY WHEN YOU DRINK ALCOHOL HOW MANY DRINKS DO YOU HAVE: 0
CONSUMPTION TOTAL: NEGATIVE

## 2020-05-19 ASSESSMENT — FIBROSIS 4 INDEX
FIB4 SCORE: 0.23
FIB4 SCORE: 0.26

## 2020-05-19 NOTE — PROGRESS NOTES
Assumed care of patient at bedside report from NOC RN. Updated on POC. Patient currently A & O x 4; on 1 L O2 nasal cannula for comfort; up max assist, declining mobilization at this time r/t weakness; with complaints of chronic pain. Call light within reach. Whiteboard updated. Fall precautions in place. Bed locked and in lowest position. All questions answered. No other needs indicated at this time.

## 2020-05-19 NOTE — ED NOTES
Attempting USIV, pt difficult stick and typically has an USIV placed due to poor access, in NAD, denies needs or concerns.

## 2020-05-19 NOTE — ED NOTES
"In to complete MRI checklist, pt states that she cannot have an MRI due to an \"inter-stem\" located in her back that controls her bowel and bladder function. Dr. Abbott made aware. Pt in NAD. Denies needs or concerns.   "

## 2020-05-19 NOTE — ASSESSMENT & PLAN NOTE
Diabetes type 2  Most recent Hemoglobin A1c is 6 on 8/29/2019  -Trulicity held and insulin glargine started with insulin aspart in  hospital with glycemia protocol in place.  -Point of control blood glucose monitoring and diabetic diet.

## 2020-05-19 NOTE — ASSESSMENT & PLAN NOTE
Recurrent idiopathic intracranial hypertension unassociated with papilledema and intermittent headaches  Was previously treated with lumbar puncture, and is on  acetazolamide  We will continue home dose of acetazolamide

## 2020-05-19 NOTE — PROGRESS NOTES
2 RN skin check complete with SONYA Post.   Devices in place: PIV, nasal canula.  Skin assessed under devices - yes.  Confirmed pressure ulcers found on NA.  New potential pressure ulcers noted on NA.     Pt. Presented with two open, red blisters on left breast. Scant drainage noted. Covered in mepilex.  Coccyx pink and blanchable.  Heels dry and intact.      The following interventions in place Q2 turns.

## 2020-05-19 NOTE — DIETARY
NUTRITION SERVICES: BMI - Pt with BMI >40 (=Body mass index is 44.13 kg/m².), morbid obesity. Poor po and/or weight loss reported on admission. Malnutrition Screening Tool score <2. Nutrition assessment not indicated at this time. Weight loss counseling not appropriate in acute care setting.     RECOMMEND - Referral to outpatient nutrition services for weight management after D/C. RD will monitor per department guidelines. Please consult RD for nutrition concerns.

## 2020-05-19 NOTE — ED PROVIDER NOTES
"ED Provider Note    Scribed for Jimmy Abbott M.D. by Laron Chandler. 5/19/2020  1:59 AM    Primary care provider: Torres Brody M.D.  Means of arrival: EMS  History obtained from: patient  History limited by: none    CHIEF COMPLAINT  Chief Complaint   Patient presents with   • Musculoskeletal Pain     hx of transverse myletis, states that a few days ago she fell to the left hip and she thinks this is what caused her flare. pt states that all of her limbs feel heavy. pt c/o decreased mobility to bilateral arms.        HPI  Kristin Balderrama is a 29 y.o. female with a history of transverse myelitis who presents to the Emergency Department with complaints of diffuse musculoskeletal pain and weakness since she woke up 2 hours ago. She states her arms \"feel like lead\". She was able to move her arms yesterday. She reports also having a headache and intermittent vision changes. Patient is on 20 mg PO steroids for optic neuritis. She states that she feels similar in comparison to when she had to be admitted for IV steroids, particularly the curling in of her limbs and heaviness of her extremities. She adds that she injured her left hip 2 days ago when she fell off of the couch. Patient currently ambulates with a wheelchair and she can sometimes use a walker. She had a CT scan of her left hip which did not show any fractures/dislocation. She is currently being treated with Azithromycin and Doxycycline for pneumonia. She was tested for COVID-19 through the health department about 2 days ago, but she has not yet been contacted about the results. No hypoxia, no worsening shortness of breath, no fever. She denies any dysuria.     PPE Note: I personally donned full PPE for all patient encounters during this visit, including being clean-shaven with an N95 respirator mask, gloves, gown, and goggles. Scribe remained outside the patient's room and did not have any contact with the patient for the duration of patient " encounter.      REVIEW OF SYSTEMS  Pertinent positives include: diffuse muscle pain, extremity weakness, pressure headache. Pertinent negatives include: dysuria. See history of present illness. All other systems are negative.     PAST MEDICAL HISTORY   has a past medical history of Abdominal pain, Anginal syndrome, Apnea, sleep, Arrhythmia, Arthritis, ASTHMA, Atrial fibrillation ([facility]), Back pain, Borderline personality disorder ([facility]), Breath shortness, Bronchitis, Cardiac arrhythmia, Chickenpox, Chronic UTI (9/18/2010), Cough, Daytime sleepiness, Depression, Diabetes ([facility]), Diarrhea, Disorder of thyroid, Fall, Fatigue, Frequent headaches, Gasping for breath, Gynecological disorder, Headache(784.0), Heart burn, History of falling, Hypertension, Indigestion, Migraine, 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[facility]), 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    SURGICAL HISTORY   has a past surgical history that includes neuro dest facet l/s w/ig sngl (4/21/2015); recovery (1/27/2016); delmar by laparoscopy (8/29/2010); lumbar fusion anterior (8/21/2012); other cardiac surgery (1/2016); tonsillectomy; bowel stimulator insertion (7/13/2016); gastroscopy with balloon dilatation (N/A, 1/4/2017); muscle biopsy (Right, 1/26/2017); other abdominal surgery; and laminotomy.    SOCIAL HISTORY  Social History     Tobacco Use   • Smoking status: Never Smoker   • Smokeless tobacco: Never Used   Substance Use Topics   • Alcohol use: No     Alcohol/week: 0.0 oz   • Drug use: Not Currently     Frequency: 7.0 times per week     Types: Marijuana, Oral     Comment: Medicinal edible's      Social History     Substance and  Sexual Activity   Drug Use Not Currently   • Frequency: 7.0 times per week   • Types: Marijuana, Oral    Comment: Medicinal edible's       FAMILY HISTORY  Family History   Problem Relation Age of Onset   • Hypertension Mother    • Sleep Apnea Mother    • Heart Disease Mother    • Other Mother         hypothryod   • Hypertension Maternal Uncle    • Heart Disease Maternal Grandmother    • Hypertension Maternal Grandmother    • No Known Problems Sister    • Other Sister         Narcolepsy;fibromyalsia;bone;nerve   • Genitourinary () Problems Sister         endometriosis       CURRENT MEDICATIONS  Current Outpatient Medications:   •  ondansetron (ZOFRAN ODT) 4 MG TABLET DISPERSIBLE, Take 1 Tab by mouth every 6 hours as needed for Nausea., Disp: 30 Tab, Rfl: 0  •  mupirocin calcium (BACTROBAN) 2 % Cream, Apply 1 g to affected area(s) 2 times a day., Disp: 1 Tube, Rfl: 0  •  insulin glargine (LANTUS SOLOSTAR) 100 UNIT/ML Solution Pen-injector injection, Inject 15 Units as instructed every evening., Disp: 5 PEN, Rfl: 3  •  ziprasidone (GEODON) 40 MG Cap, TAKE ONE CAPSULE BY MOUTH TWICE DAILY, Disp: 60 Cap, Rfl: 0  •  fluoxetine (PROZAC) 40 MG capsule, TAKE ONE CAPSULE BY MOUTH ONCE A DAY, Disp: 30 Cap, Rfl: 0  •  lactulose 10 GM/15ML Solution, Take 15 mL by mouth 2 times a day., Disp: 1 Bottle, Rfl: 2  •  acetaZOLAMIDE (DIAMOX) 250 MG Tab, Take 2 Tabs by mouth 2 times a day., Disp: 120 Tab, Rfl: 0  •  predniSONE (DELTASONE) 20 MG Tab, Take 1 Tab by mouth every day., Disp: 30 Tab, Rfl: 0  •  senna-docusate (PERICOLACE OR SENOKOT S) 8.6-50 MG Tab, Take 2 Tabs by mouth 2 times a day., Disp: , Rfl:   •  busPIRone (BUSPAR) 10 MG Tab tablet, TAKE ONE TABLET BY MOUTH TWICE DAILY, Disp: 60 Tab, Rfl: 0  •  Ivabradine HCl (CORLANOR) 7.5 MG Tab, Take 7.5 mg by mouth 2 Times a Day. Needs to schedule follow up with Dr. Wilde, Disp: 180 Tab, Rfl: 1  •  levothyroxine (SYNTHROID) 100 MCG Tab, Take 1 Tab by mouth Every morning on an  empty stomach., Disp: 30 Tab, Rfl: 2  •  vitamin D (VITAMIND D3) 1000 UNIT Tab, Take 1 Tab by mouth every day., Disp: 60 Tab, Rfl: 0  •  modafinil (PROVIGIL) 200 MG Tab, Take 1 Tab by mouth every morning for 60 days., Disp: 30 Tab, Rfl: 1  •  pregabalin (LYRICA) 300 MG capsule, Take 300 mg by mouth 2 times a day., Disp: , Rfl:   •  OXcarbazepine (TRILEPTAL) 150 MG Tab, Take 1 Tab by mouth 2 Times a Day., Disp: 60 Tab, Rfl: 2  •  promethazine (PHENERGAN) 25 MG Suppos, Insert 1 Suppository in rectum every 6 hours as needed for Nausea/Vomiting., Disp: 10 Suppository, Rfl: 0  •  traZODone (DESYREL) 100 MG Tab, Take 0.5 Tabs by mouth every evening., Disp: 30 Tab, Rfl: 3  •  budesonide-formoterol (SYMBICORT) 160-4.5 MCG/ACT Aerosol, Inhale 2 Puffs by mouth 2 Times a Day., Disp: , Rfl:   •  aspirin EC (ECOTRIN) 81 MG Tablet Delayed Response, Take 81 mg by mouth every day., Disp: , Rfl:   •  ondansetron (ZOFRAN ODT) 4 MG TABLET DISPERSIBLE, Take 4 mg by mouth every 6 hours as needed for Nausea., Disp: , Rfl:   •  potassium chloride SA (KDUR) 20 MEQ Tab CR, Take 20 mEq by mouth 2 times a day., Disp: , Rfl:   •  tiotropium (SPIRIVA) 18 MCG Cap, Inhale 1 Cap by mouth every day., Disp: 90 Cap, Rfl: 3  •  ipratropium-albuterol (DUONEB) 0.5-2.5 (3) MG/3ML nebulizer solution, 3 mL by Nebulization route every 6 hours as needed for Shortness of Breath., Disp: 60 Bullet, Rfl: 1  •  etonogestrel (NEXPLANON) 68 MG Implant implant, Inject 1 Each as instructed Once., Disp: , Rfl:   •  mycophenolate (CELLCEPT) 500 MG tablet, Take 1,000 mg by mouth 2 times a day., Disp: , Rfl:   •  Dulaglutide (TRULICITY) 1.5 MG/0.5ML Solution Pen-injector, Inject 1.5 mg as instructed every Friday., Disp: , Rfl:   •  albuterol 108 (90 Base) MCG/ACT Aero Soln inhalation aerosol, Inhale 2 Puffs by mouth every 6 hours as needed for Shortness of Breath., Disp: , Rfl:   •  Melatonin 10 MG Tab, Take 10 mg by mouth every evening., Disp: , Rfl:   •  furosemide  "(LASIX) 80 MG Tab, Take 80 mg by mouth 1 time daily as needed., Disp: , Rfl:      ALLERGIES  Allergies   Allergen Reactions   • Depakote [Divalproex Sodium] Unspecified     Muscle spasms/muscle pain and weakness     • Doxycycline Anaphylaxis and Vomiting     RXN=unknown   • Amitriptyline Unspecified     Headaches     • Aripiprazole [Abilify] Unspecified     Headaches/muscle twitching     • Clindamycin Nausea     Even with food     • Flagyl [Metronidazole Hcl] Unspecified     \"eye problems\"     • Flomax [Tamsulosin Hydrochloride] Swelling   • Levaquin Unspecified     Severe muscle cramps in legs  RXN=unknown   • Metformin Unspecified     Increased lactic acid      • Tape Rash     Tears skin off  coban with Tegaderm tape ok intermittently  RXN=ongoing   • Vancomycin Itching     Pt becomes flushed in face and chest.   RXN=7/10/16   • Wound Dressing Adhesive Hives     By pt report   • Ciprofloxacin    • Depakote  [Divalproex Sodium]    • Hydromorphone Hcl      Pt states she feels loopy   • Kdc:Metronidazole+Tartrazine    • Keflex Rash     Pt states she gets a rash with this medication  Tolerates ceftriaxone   • Levofloxacin Unspecified     Leg muscle cramps   • Penicillins        PHYSICAL EXAM  VITAL SIGNS: /70   Pulse 80   Temp 36.6 °C (97.8 °F) (Oral)   Resp 18   Ht 1.651 m (5' 5\")   Wt 118.4 kg (261 lb 0.4 oz)   SpO2 100%   BMI 43.44 kg/m²     Constitutional: Alert in no apparent distress.  HENT: No signs of trauma, Bilateral external ears normal, Nose normal. Uvula midline.   Eyes: Pupils are equal and reactive, Conjunctiva normal, Non-icteric.   Neck: Normal range of motion, No tenderness, Supple, No stridor.   Lymphatic: No lymphadenopathy noted.   Cardiovascular: Regular rate and rhythm, no murmurs.   Thorax & Lungs: Normal breath sounds, No respiratory distress, No wheezing, No chest tenderness.   Abdomen:  Soft, No tenderness to palpation, No peritoneal signs, No masses, No pulsatile masses. "   Skin: Warm, Dry, No erythema, No rash.   Back: No bony tenderness, No CVA tenderness.   Extremities: Intact distal pulses, No edema, No tenderness, No cyanosis.  Musculoskeletal: Left hip tenderness to palpation.  Neurologic: Alert,  2/5 strength in bilateral upper and lower extremities, Normal sensory function.  Psychiatric: Affect normal, Judgment normal, Mood normal.       DIAGNOSTIC STUDIES / PROCEDURES    LABS  Labs Reviewed   CBC WITH DIFFERENTIAL - Abnormal; Notable for the following components:       Result Value    RBC 4.12 (*)     MCV 98.5 (*)     MCHC 31.0 (*)     RDW 53.5 (*)     Platelet Count 152 (*)     All other components within normal limits    Narrative:     Droplet, Contact, and Eye Protection  Indicate which anticoagulants the patient is on:->UNKNOWN   COMP METABOLIC PANEL - Abnormal; Notable for the following components:    Co2 16 (*)     AST(SGOT) 7 (*)     Total Protein 5.9 (*)     Globulin 1.8 (*)     All other components within normal limits    Narrative:     Droplet, Contact, and Eye Protection  Indicate which anticoagulants the patient is on:->UNKNOWN   APTT - Abnormal; Notable for the following components:    APTT 24.1 (*)     All other components within normal limits    Narrative:     Droplet, Contact, and Eye Protection  Indicate which anticoagulants the patient is on:->UNKNOWN   FREE THYROXINE - Abnormal; Notable for the following components:    Free T-4 0.88 (*)     All other components within normal limits    Narrative:     Droplet, Contact, and Eye Protection  Indicate which anticoagulants the patient is on:->UNKNOWN   MAGNESIUM    Narrative:     Droplet, Contact, and Eye Protection  Indicate which anticoagulants the patient is on:->UNKNOWN   PHOSPHORUS    Narrative:     Droplet, Contact, and Eye Protection  Indicate which anticoagulants the patient is on:->UNKNOWN   FERRITIN    Narrative:     Droplet, Contact, and Eye Protection  Indicate which anticoagulants the patient is  "on:->UNKNOWN   TROPONIN    Narrative:     Droplet, Contact, and Eye Protection  Indicate which anticoagulants the patient is on:->UNKNOWN   CREATINE KINASE    Narrative:     Droplet, Contact, and Eye Protection  Indicate which anticoagulants the patient is on:->UNKNOWN   D-DIMER    Narrative:     Droplet, Contact, and Eye Protection  Indicate which anticoagulants the patient is on:->UNKNOWN   CRP QUANTITIVE (NON-CARDIAC)    Narrative:     Droplet, Contact, and Eye Protection  Indicate which anticoagulants the patient is on:->UNKNOWN   PROBRAIN NATRIURETIC PEPTIDE, NT    Narrative:     Droplet, Contact, and Eye Protection  Indicate which anticoagulants the patient is on:->UNKNOWN   SED RATE    Narrative:     Droplet, Contact, and Eye Protection  Indicate which anticoagulants the patient is on:->UNKNOWN   PROCALCITONIN    Narrative:     Droplet, Contact, and Eye Protection  Indicate which anticoagulants the patient is on:->UNKNOWN   LDH    Narrative:     Droplet, Contact, and Eye Protection  Indicate which anticoagulants the patient is on:->UNKNOWN   INTERLEUKIN 6    Narrative:     Droplet, Contact, and Eye Protection   LACTIC ACID    Narrative:     Droplet, Contact, and Eye Protection   PROTHROMBIN TIME    Narrative:     Droplet, Contact, and Eye Protection  Indicate which anticoagulants the patient is on:->UNKNOWN   COVID/SARS COV-2    Narrative:     Droplet, Contact, and Eye Protection  Rule-out COVID-19 panel, includes droplet/contact/eye  isolation order.  Check influenza to order this test only if  specifically indicated.   BLOOD CULTURE    Narrative:     Droplet, Contact, and Eye Protection  1 of 2 for Blood Culture x 2 sites order. Per Hospital  Policy: Only change Specimen Src: to \"Line\" if specified by  physician order.   BLOOD CULTURE    Narrative:     Droplet, Contact, and Eye Protection  2 of 2 blood culture x2  Sites order. Per Hospital Policy:  Only change Specimen Src: to \"Line\" if specified by " physician  order.   URINALYSIS,CULTURE IF INDICATED    Narrative:     Droplet, Contact, and Eye Protection  Rule-out COVID-19 panel, includes droplet/contact/eye  isolation order.  Check influenza to order this test only if  specifically indicated.   TSH    Narrative:     Droplet, Contact, and Eye Protection  Indicate which anticoagulants the patient is on:->UNKNOWN   SARS-COV-2, PCR (IN-HOUSE)    Narrative:     Droplet, Contact, and Eye Protection  Rule-out COVID-19 panel, includes droplet/contact/eye  isolation order.  Check influenza to order this test only if  specifically indicated.   ESTIMATED GFR    Narrative:     Droplet, Contact, and Eye Protection  Indicate which anticoagulants the patient is on:->UNKNOWN   LACTIC ACID   LACTIC ACID      All labs reviewed by me.      RADIOLOGY  DX-CHEST-PORTABLE (1 VIEW)   Final Result      Diminishing lung volumes with no radiographic evidence of acute cardiopulmonary process.      MR-BRAIN-WITH & W/O    (Results Pending)   MR-ORBITS,FACE,NECK-WITH&W/O & SEQUENCES    (Results Pending)     The radiologist's interpretation of all radiological studies have been reviewed by me.    COURSE & MEDICAL DECISION MAKING  Nursing notes, VS, PMSFHx reviewed in chart.    29 y.o. female p/w chief complaint of musculoskeletal pain, weakness, left hip pain, and weakness.    1:59 AM Patient seen and examined at bedside.      The differential diagnoses include but are not limited to:     Upon chart review, patient has a history of relapsing inflammatory optic neuropathy.     #weakness  - Optic neuritis given patient states she has had some visual changes. No eye swelling today. Last episode approximately 1 month ago.   -Patient has an extensive history of psychiatric disease. Symptoms could be psychogenic in nature.    3:03 AM Paged neurology. Recommend giving high dose steroids at this time or holding off until MRI results.       3:36 AM I discussed the patient's case and the above  "findings with Dr. Rincon (Neurology) who advised withholding IV steroids until obtaining an MRI of the brain and orbit w/wo. He agrees with the plan for hospitalization.     4:08 AM - Contacted in regards to the patient's MRI. She is unable to have this completed as she has an \"inter-stem\" in her back to help control her bowel and bladder function.    5:15 AM  I discussed the patient's case and the above findings with [facility] who accepts the patient for hospitalization.     5:18 AM I spoke with Dr. Rincon again and updated him that we are unable to obtain an MRI at this time. He advised that there is no added benefit in obtaining a CT of her head at this time. We will hold off on steroids for the next 4 hours until neurology is able to evaluate the patient at bedside.    DISPOSITION:  Patient will be hospitalized by [facility] in guarded condition.     FINAL IMPRESSION  1. Arm weakness    2. Leg weakness, bilateral    3. Community acquired pneumonia, unspecified laterality          ILaron (Scribsadie), am scribing for, and in the presence of, Jimmy Abbott M.D..    Electronically signed by: Laron Chandler (Carolibsadie), 5/19/2020    IJimmy M.D. personally performed the services described in this documentation, as scribed by Laron Chandler in my presence, and it is both accurate and complete.    The note accurately reflects work and decisions made by me.  Jimmy Abbott M.D.  5/19/2020  6:08 AM    "

## 2020-05-19 NOTE — PROGRESS NOTES
Brief Neurology Note    Case discussed with Dr. Abbott.     29 wheelchair bound woman w hx of psychiatric disease as well as optic neuritis presenting with nonfocal weakness and blurred vision, worsening over the past 24hr. ERP reports 3/5 strength x4.    Preliminary recs:  - brain MRI and orbits w and without cst  - UA, chest xray, and treat any toxic metabolic derangements   - if enhancement is seen on imaging, solumed rol 1g IV qd for 3-5 days with GI ppx and accuchecks    Formal neurology consult to follow.     AUTUMN Rincon MD  Neurology

## 2020-05-19 NOTE — ASSESSMENT & PLAN NOTE
"Patient with extensive neurological history ( transverse myelitis , recurrent optic neuritis,hemiplegic migraine).  C/O generalized body weakness ( \"limbs feel slike lead\") started suddenly when he woke up on the day of admission.  Associated with nausea, headache and blurring of vision.   Physical exam with decreased power in upper and lower extremities.   MRI contraindicated.  CT head with and without contrast negative for enhancement, mass or other abnormality    Plan:  - Fall/seizure/aspiration precaution.  - Neurocheck q4h.  - Neurology consulted waiting formal recommendations  - Follow neurology recs.  - On oral prednisolone 20 mg daily  "

## 2020-05-19 NOTE — RESPIRATORY CARE
Oxygen Rounds      Patient found on    O2 L/m:  ___1______    Oxygen device:  ____NC____   Spo2: ___98______%      Patient titrated to   O2 L/m: __0______   Oxygen device: ___NC______   Spo2: ___95______%   Respiratory device skin site inspection completed.

## 2020-05-19 NOTE — PROGRESS NOTES
Daily Progress Note:     Date of Service: 5/19/2020  Primary Team: Purple team  Attending: Spencer Rios M.D.   Senior Resident:   Intern: Dr. Hamilton  Contact:  [phone]    Chief Complaint:   Optic neuritis with nonfocal weakness and blurring of vision    Subjective  Patient was seen this a.m, with generalized weakness nonfocal, in both upper and lower extremities, with persistent blurring of vision in both eyes  -Patient still has a persistent cough, and was on treatment on azithromycin and doxycycline for pneumonia, and was evaluated at [facility] in [place], COVID test on presentation at [facility] ER was negative.  -Awaiting formal recommendations from neurology  Case discussed with Dr. Cabral neuro-ophthalmology, advised to continue on prednisone 20 mg daily with testing for visual evoked potential large and small target and CT scan of the brain with and without contrast, for further evaluation by neuro-ophthalmology regarding her optic neuritis  Face sheet of patient to be faxed to him at [phone], Dr. Middleton will evaluate  Purwick external cath system placed for comfort.  Pregnancy test before CT contrast, nurse in charge aware.    Awaiting recommendations from neurology.  Consultants/Specialty:  Neurology: Dr. Rincon  Neuro 0phthalmology .    Review of Systems:   Review of Systems   Constitutional: Positive for diaphoresis and malaise/fatigue. Negative for chills, fever and weight loss.   HENT: Negative for congestion, nosebleeds, sinus pain, sore throat and tinnitus.    Eyes: Positive for blurred vision and pain. Negative for double vision, photophobia, discharge and redness.   Respiratory: Positive for cough and sputum production. Negative for hemoptysis, shortness of breath, wheezing and stridor.    Cardiovascular: Negative for chest pain, palpitations and leg swelling.   Gastrointestinal: Positive for constipation and nausea. Negative for abdominal pain, blood in  stool, diarrhea, heartburn and vomiting.   Genitourinary: Negative for dysuria, frequency and urgency.   Musculoskeletal: Negative for back pain, joint pain and neck pain.   Skin: Negative for itching and rash.   Neurological: Positive for weakness and headaches. Negative for dizziness, tremors, sensory change, speech change and seizures.   Psychiatric/Behavioral: The patient is nervous/anxious. The patient does not have insomnia.        Objective Data:   Physical Exam:   Vitals:   Temp:  [36.6 °C (97.8 °F)-36.7 °C (98.1 °F)] 36.7 °C (98.1 °F)  Pulse:  [65-80] 77  Resp:  [16-21] 16  BP: (118-147)/(63-83) 118/73  SpO2:  [95 %-100 %] 95 %    Physical Exam  Constitutional:       Appearance: Normal appearance.   HENT:      Head: Normocephalic and atraumatic.      Nose: Nose normal. No congestion or rhinorrhea.      Mouth/Throat:      Mouth: Mucous membranes are dry.   Eyes:      Extraocular Movements: Extraocular movements intact.      Pupils: Pupils are equal, round, and reactive to light.   Cardiovascular:      Rate and Rhythm: Normal rate and regular rhythm.      Heart sounds: Normal heart sounds. No murmur. No friction rub. No gallop.    Pulmonary:      Effort: Pulmonary effort is normal. No respiratory distress.      Breath sounds: Normal breath sounds. No stridor.   Abdominal:      General: There is distension.      Palpations: Abdomen is soft.      Tenderness: There is no abdominal tenderness. There is no guarding.   Musculoskeletal: Normal range of motion.   Skin:     General: Skin is warm and dry.   Neurological:      Mental Status: She is alert and oriented to person, place, and time.      Comments: Grade-3/5 both upper and lower extremities   Psychiatric:         Mood and Affect: Mood normal.         Behavior: Behavior normal.           Labs:   Results for orders placed or performed during the hospital encounter of 05/19/20   CBC with Differential   Result Value Ref Range    WBC 4.9 4.8 - 10.8 K/uL    RBC  4.12 (L) 4.20 - 5.40 M/uL    Hemoglobin 12.6 12.0 - 16.0 g/dL    Hematocrit 40.6 37.0 - 47.0 %    MCV 98.5 (H) 81.4 - 97.8 fL    MCH 30.6 27.0 - 33.0 pg    MCHC 31.0 (L) 33.6 - 35.0 g/dL    RDW 53.5 (H) 35.9 - 50.0 fL    Platelet Count 152 (L) 164 - 446 K/uL    MPV 11.4 9.0 - 12.9 fL    Neutrophils-Polys 62.50 44.00 - 72.00 %    Lymphocytes 26.10 22.00 - 41.00 %    Monocytes 10.00 0.00 - 13.40 %    Eosinophils 0.80 0.00 - 6.90 %    Basophils 0.20 0.00 - 1.80 %    Immature Granulocytes 0.40 0.00 - 0.90 %    Nucleated RBC 0.00 /100 WBC    Neutrophils (Absolute) 3.06 2.00 - 7.15 K/uL    Lymphs (Absolute) 1.28 1.00 - 4.80 K/uL    Monos (Absolute) 0.49 0.00 - 0.85 K/uL    Eos (Absolute) 0.04 0.00 - 0.51 K/uL    Baso (Absolute) 0.01 0.00 - 0.12 K/uL    Immature Granulocytes (abs) 0.02 0.00 - 0.11 K/uL    NRBC (Absolute) 0.00 K/uL   Comp Metabolic Panel   Result Value Ref Range    Sodium 139 135 - 145 mmol/L    Potassium 3.7 3.6 - 5.5 mmol/L    Chloride 111 96 - 112 mmol/L    Co2 16 (L) 20 - 33 mmol/L    Anion Gap 12.0 7.0 - 16.0    Glucose 88 65 - 99 mg/dL    Bun 17 8 - 22 mg/dL    Creatinine 0.83 0.50 - 1.40 mg/dL    Calcium 9.3 8.5 - 10.5 mg/dL    AST(SGOT) 7 (L) 12 - 45 U/L    ALT(SGPT) 28 2 - 50 U/L    Alkaline Phosphatase 47 30 - 99 U/L    Total Bilirubin 0.2 0.1 - 1.5 mg/dL    Albumin 4.1 3.2 - 4.9 g/dL    Total Protein 5.9 (L) 6.0 - 8.2 g/dL    Globulin 1.8 (L) 1.9 - 3.5 g/dL    A-G Ratio 2.3 g/dL   Magnesium   Result Value Ref Range    Magnesium 1.9 1.5 - 2.5 mg/dL   Phosphorus   Result Value Ref Range    Phosphorus 3.5 2.5 - 4.5 mg/dL   Ferritin   Result Value Ref Range    Ferritin 74.9 10.0 - 291.0 ng/mL   Troponin   Result Value Ref Range    Troponin T <6 6 - 19 ng/L   Creatinine Kinase   Result Value Ref Range    CPK Total 14 0 - 154 U/L   D-Dimer   Result Value Ref Range    D-Dimer Screen <0.27 0.00 - 0.50 ug/mL (FEU)   CRP Quantitative (Non-Cardiac)   Result Value Ref Range    Stat C-Reactive Protein 0.07  0.00 - 0.75 mg/dL   proBrain Natriuretic Peptide, NT   Result Value Ref Range    NT-proBNP 116 0 - 125 pg/mL   Sed Rate   Result Value Ref Range    Sed Rate Westergren 5 0 - 20 mm/hour   Procalcitonin   Result Value Ref Range    Procalcitonin <0.05 <0.25 ng/mL   LDH   Result Value Ref Range    LDH Total 233 107 - 266 U/L   Lactic Acid   Result Value Ref Range    Lactic Acid 1.4 0.5 - 2.0 mmol/L   aPTT   Result Value Ref Range    APTT 24.1 (L) 24.7 - 36.0 sec   Prothrombin Time   Result Value Ref Range    PT 13.1 12.0 - 14.6 sec    INR 0.97 0.87 - 1.13   COVID/SARS CoV-2    Specimen: Nasopharyngeal; Respirate   Result Value Ref Range    COVID Order Status Received    URINALYSIS,CULTURE IF INDICATED    Specimen: Blood   Result Value Ref Range    Color Yellow     Character Clear     Specific Gravity 1.020 <1.035    Ph 6.5 5.0 - 8.0    Glucose Negative Negative mg/dL    Ketones Negative Negative mg/dL    Protein Negative Negative mg/dL    Bilirubin Negative Negative    Urobilinogen, Urine 1.0 Negative    Nitrite Negative Negative    Leukocyte Esterase Negative Negative    Occult Blood Negative Negative    Micro Urine Req see below    TSH   Result Value Ref Range    TSH 1.460 0.380 - 5.330 uIU/mL   FREE THYROXINE   Result Value Ref Range    Free T-4 0.88 (L) 0.93 - 1.70 ng/dL   SARS-CoV-2, PCR (In-House)   Result Value Ref Range    SARS-CoV-2 Source NP Swab     SARS-CoV-2 by PCR NotDetected NotDetected   ESTIMATED GFR   Result Value Ref Range    GFR If African American >60 >60 mL/min/1.73 m 2    GFR If Non African American >60 >60 mL/min/1.73 m 2   TSH WITH REFLEX TO FT4   Result Value Ref Range    TSH 1.230 0.380 - 5.330 uIU/mL   ACCU-CHEK GLUCOSE   Result Value Ref Range    Glucose - Accu-Ck 84 65 - 99 mg/dL   ACCU-CHEK GLUCOSE   Result Value Ref Range    Glucose - Accu-Ck 79 65 - 99 mg/dL   EKG   Result Value Ref Range    Report       [facility]    Test Date:  2020-05-19  Pt Name:    HARLEY DE LA CRUZ               Department: ER  MRN:        [id number]                      Room:       S175  Gender:     Female                       Technician: TXM  :        1990                   Requested By:LUIS EDUARDO ROY  Order #:    [id number]                    Reading MD:    Measurements  Intervals                                Axis  Rate:       70                           P:          19  RI:         152                          QRS:        21  QRSD:       96                           T:          7  QT:         404  QTc:        436    Interpretive Statements  SINUS RHYTHM  BORDERLINE T ABNORMALITIES, ANTERIOR LEADS  Compared to ECG 2020 16:56:26  No significant changes     EKG   Result Value Ref Range    Report       [facility]    Test Date:  2020  Pt Name:    HARLEY DE LA CRUZ              Department: ER  MRN:        [id number]                      Room:       S175  Gender:     Female                       Technician: TXM  :        1990                   Requested By:SONNY AGUILAR  Order #:    [id number]                    Reading MD: Natan Sosa MD    Measurements  Intervals                                Axis  Rate:       70                           P:          19  RI:         152                          QRS:        21  QRSD:       96                           T:          7  QT:         404  QTc:        436    Interpretive Statements  SINUS RHYTHM  BORDERLINE T ABNORMALITIES, ANTERIOR LEADS  Compared to ECG 2020 16:56:26  No significant changes  Electronically Signed On 2020 11:10:52 PDT by Natan Sosa MD       *Note: Due to a large number of results and/or encounters for the requested time period, some results have not been displayed. A complete set of results can be found in Results Review.     Imaging:   DX-CHEST-PORTABLE (1 VIEW)   Final Result      Diminishing lung volumes with no radiographic evidence of acute cardiopulmonary process.      MR-BRAIN-WITH & W/O    (Results Pending)  "  MR-ORBITS,FACE,NECK-WITH&W/O & SEQUENCES    (Results Pending)         * Generalized weakness  Assessment & Plan  - patient with extensive neurological history ( transverse myelitis , recurrent optic neuritis,hemiplegic migraine).  - C/O generalized body weakness ( \"limbs feel slike lead\") started suddenly when he woke up last night at around 12:00 am.  - associated with nausea, headache and blurring of vision.   - Physical exam with decreased power in upper and lower extremities.  - neurology on board, MRI contraindicated.  CT head with and without contrast ordered by neuro-ophthalmology Dr. Cabral.    @Plan:  - Admit to obs.  - Fall/seizure/aspiration precaution.  - Neurocheck q4h.  - Neurology consulted waiting formal recommendations  - Follow neurology recs.  -On oral prednisolone 20 mg daily    Intracranial hypertension- (present on admission)  Assessment & Plan  Recurrent idiopathic intracranial hypertension unassociated with papilledema and intermittent headaches  Was previously treated with lumbar puncture, and is on  acetazolamide  We will continue home dose of acetazolamide    Diabetes mellitus type 2 in obese (HCC)- (present on admission)  Assessment & Plan  Diabetes type 2  Most recent Hemoglobin A1c is 6 on 8/29/2019  -Trulicity held and insulin glargine started with insulin aspart in  hospital with glycemia protocol in place.  -Point of control blood glucose monitoring and diabetic diet.      Hypothyroidism- (present on admission)  Assessment & Plan  TSH 1.46  On Levothyroxine 100 mcg.  Continue levothyroxine    Depression- (present on admission)  Assessment & Plan  On prozac and Buspar.  Stable.    "

## 2020-05-19 NOTE — EEG PROGRESS NOTE
Called Rn and advised Technologist will be coming to do VEP on 5/20/20 at 0800. Will call prior - will need pt awake, alert and in bed.

## 2020-05-19 NOTE — PROGRESS NOTES
Med rec completed per pt   Allergies reviewed    Pt started a Z-Ilya and 10 day course of Doxycycline on 5/16/2020  Pt needs to take Benadryl with every dose of Doxycycline due to an allergic reaction to this medication

## 2020-05-19 NOTE — PROGRESS NOTES
Pt. Transported to S175 with belongings. Bed locked and low, call light in reach. VSS. Pt. With no complaints at this time.

## 2020-05-19 NOTE — ED NOTES
Pt repositioned to right side, HOB elevated upon request. Pt denies further needs or concerns. In NAD.

## 2020-05-19 NOTE — SENIOR ADMIT NOTE
"Patient is a 29-year-old female with complex medical history significant for transverse myelitis, optic neuritis, chronic relapsing inflammatory optic neuropathy syndrome, idiopathic intracranial hypertension with papilledema on Diamox being admitted for generalized weakness with blurry vision that started around 12 PM last night.    ER physician, Dr. Abbott consulted neurology, Dr. Guidry.  Initially recommended MRI, however, patient has \"Inter-stem\" which prevents her from getting MRIs.  No need for CT at this time.    Physical exam  -Diffuse 3/5 weakness in all extremities  -Contracted palms    #Generalized weakness    Plan  -Due to to contact Dr. Morrison, neuro-ophthalmology who has been following the patient in the outpatient setting  -Hold off on starting high-dose steroids for now  - Pharmacy consult placed for medication reconciliation   - Will restart meds based on last discharge summary for now      "

## 2020-05-19 NOTE — H&P
"History & Physical Note    Date of Admission: 5/19/2020  Admission Status: Emergency  [facility] Team: [facility]  Attending: PASTORA LEIJA M.D.   Senior Resident: Dr. Garnica.  Intern: Dr. Marcos.  Contact Number: [phone]    Chief Complaint:   \"My limbs feels like lead\"     History of Present Illness (HPI):   Kristin is a 29 y.o. female with complex PMH of Chronic relapsing inflammatory optic neuropathy (CRION),  hypothyroidism, DM2, TONYA, obesity, Asthma, chronic hip and back pain, SVT s/p ablation, hemiplegic migraine, intracranial hypertension, Hx of transverse myelitis and multiple psychiatric issues who presented to the ED on 5/19/2020 with a complaints of \"my body is shutting down\" \" my arm feels like lead/heavy\". patent states  she woke up at midnight with a sensation of her upper limbs feeling like lead which then spread to her legs and that she couldn't move her limbs.patient also reports headache, blurring of vision and nausea but no numbness, tingling, loss of consciousness, seizure, loss of urinary or bowel control,and no head trauma.  Patient also states she fell down on Sunday and hurt her left hip. She also states that she has been constipated for 6 days and that on Friday she had diarrhea(??) , flu like symptoms, went to [facility],got tested for COVID-19 and was given Zpack for pneumonia.    In the ED patient vitals were IN 69 ; /68 ; RR 21 ; temp 36.6 C ;SPO2 100% RA.  Labs showed  CBC, CMP, Lactic acid,trop,NT-proBNP ,INR/PTT, D-Dimer, CRP, CPK ,TSH and LDH WNL.slightly low fT4; COVID-19 non-detected.  CXR unremarkable.    Neurology ( ) is to evaluate patient and put their recommendation.     Review of Systems:   Review of Systems   Constitutional: Negative for diaphoresis, fever and weight loss.   HENT: Negative for ear pain.    Eyes: Positive for blurred vision. Negative for double vision, photophobia, pain and discharge.   Respiratory: Positive for " cough (on and off) and shortness of breath. Negative for sputum production.    Cardiovascular: Negative for palpitations and orthopnea.   Gastrointestinal: Positive for constipation and nausea. Negative for abdominal pain, blood in stool, diarrhea, heartburn, melena and vomiting.   Genitourinary: Negative for dysuria, frequency and urgency.   Musculoskeletal: Positive for joint pain (left hip pain). Negative for back pain and neck pain.   Skin: Negative for itching and rash.   Neurological: Positive for weakness and headaches. Negative for dizziness, tingling, tremors, sensory change, speech change, seizures and loss of consciousness.   Psychiatric/Behavioral: Negative for substance abuse and suicidal ideas.       When reviewing histories, add date and indication in the history section whenever possible.  Past Medical History:   Past Medical History was reviewed with patient.   has a past medical history of Abdominal pain, Anginal syndrome, Apnea, sleep, Arrhythmia, Arthritis, ASTHMA, Atrial fibrillation ([facility]), Back pain, Borderline personality disorder ([facility]), Breath shortness, Bronchitis, Cardiac arrhythmia, Chickenpox, Chronic UTI (9/18/2010), Cough, Daytime sleepiness, Depression, Diabetes ([facility]), Diarrhea, Disorder of thyroid, Fall, Fatigue, Frequent headaches, Gasping for breath, Gynecological disorder, Headache(784.0), Heart burn, History of falling, Hypertension, Indigestion, Migraine, 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[facility]), 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[facility]), Nausea, Obesity, Pain (08-15-12), Painful joint, PCOS (polycystic ovarian syndrome), Pneumonia (2012), Psychosis ([facility]), Renal disorder, Ringing in ears, Scoliosis, Shortness of breath, Sinus tachycardia (10/31/2013), Sleep apnea, Snoring, Tonsillitis, Tuberculosis, Urinary bladder disorder, Urinary incontinence, Weakness, and Wears glasses.    Past Surgical History: Past Surgical History was reviewed with patient.   has a past surgical history that includes neuro  dest facet l/s w/ig sngl (4/21/2015); recovery (1/27/2016); [place] by laparoscopy (8/29/2010); lumbar fusion anterior (8/21/2012); other cardiac surgery (1/2016); tonsillectomy; bowel stimulator insertion (7/13/2016); gastroscopy with balloon dilatation (N/A, 1/4/2017); muscle biopsy (Right, 1/26/2017); other abdominal surgery; and laminotomy.    Medications: Medications have been reviewed with patient.  Prior to Admission Medications   Prescriptions Last Dose Informant Patient Reported? Taking?   Dulaglutide (TRULICITY) 1.5 MG/0.5ML Solution Pen-injector  Patient Yes No   Sig: Inject 1.5 mg as instructed every Friday.   Ivabradine HCl (CORLANOR) 7.5 MG Tab  Patient No No   Sig: Take 7.5 mg by mouth 2 Times a Day. Needs to schedule follow up with Dr. Wilde   Melatonin 10 MG Tab  Patient Yes No   Sig: Take 10 mg by mouth every evening.   OXcarbazepine (TRILEPTAL) 150 MG Tab  Patient No No   Sig: Take 1 Tab by mouth 2 Times a Day.   acetaZOLAMIDE (DIAMOX) 250 MG Tab   No No   Sig: Take 2 Tabs by mouth 2 times a day.   albuterol 108 (90 Base) MCG/ACT Aero Soln inhalation aerosol  Patient Yes No   Sig: Inhale 2 Puffs by mouth every 6 hours as needed for Shortness of Breath.   aspirin EC (ECOTRIN) 81 MG Tablet Delayed Response  Patient Yes No   Sig: Take 81 mg by mouth every day.   budesonide-formoterol (SYMBICORT) 160-4.5 MCG/ACT Aerosol  Patient Yes No   Sig: Inhale 2 Puffs by mouth 2 Times a Day.   busPIRone (BUSPAR) 10 MG Tab tablet  Patient No No   Sig: TAKE ONE TABLET BY MOUTH TWICE DAILY   etonogestrel (NEXPLANON) 68 MG Implant implant  Patient Yes No   Sig: Inject 1 Each as instructed Once.   fluoxetine (PROZAC) 40 MG capsule   No No   Sig: TAKE ONE CAPSULE BY MOUTH ONCE A DAY   furosemide (LASIX) 80 MG Tab  Patient Yes No   Sig: Take 80 mg by mouth 1 time daily as needed.   insulin glargine (LANTUS SOLOSTAR) 100 UNIT/ML Solution Pen-injector injection   No No   Sig: Inject 15 Units as instructed every  evening.   ipratropium-albuterol (DUONEB) 0.5-2.5 (3) MG/3ML nebulizer solution  Patient No No   Sig: 3 mL by Nebulization route every 6 hours as needed for Shortness of Breath.   lactulose 10 GM/15ML Solution   No No   Sig: Take 15 mL by mouth 2 times a day.   levothyroxine (SYNTHROID) 100 MCG Tab  Patient No No   Sig: Take 1 Tab by mouth Every morning on an empty stomach.   modafinil (PROVIGIL) 200 MG Tab  Patient No No   Sig: Take 1 Tab by mouth every morning for 60 days.   mupirocin calcium (BACTROBAN) 2 % Cream   No No   Sig: Apply 1 g to affected area(s) 2 times a day.   mycophenolate (CELLCEPT) 500 MG tablet  Patient Yes No   Sig: Take 1,000 mg by mouth 2 times a day.   ondansetron (ZOFRAN ODT) 4 MG TABLET DISPERSIBLE  Patient Yes No   Sig: Take 4 mg by mouth every 6 hours as needed for Nausea.   ondansetron (ZOFRAN ODT) 4 MG TABLET DISPERSIBLE   No No   Sig: Take 1 Tab by mouth every 6 hours as needed for Nausea.   potassium chloride SA (KDUR) 20 MEQ Tab CR  Patient Yes No   Sig: Take 20 mEq by mouth 2 times a day.   predniSONE (DELTASONE) 20 MG Tab   No No   Sig: Take 1 Tab by mouth every day.   pregabalin (LYRICA) 300 MG capsule  Patient Yes No   Sig: Take 300 mg by mouth 2 times a day.   promethazine (PHENERGAN) 25 MG Suppos  Patient No No   Sig: Insert 1 Suppository in rectum every 6 hours as needed for Nausea/Vomiting.   senna-docusate (PERICOLACE OR SENOKOT S) 8.6-50 MG Tab  Patient Yes No   Sig: Take 2 Tabs by mouth 2 times a day.   tiotropium (SPIRIVA) 18 MCG Cap  Patient No No   Sig: Inhale 1 Cap by mouth every day.   traZODone (DESYREL) 100 MG Tab  Patient No No   Sig: Take 0.5 Tabs by mouth every evening.   vitamin D (VITAMIND D3) 1000 UNIT Tab  Patient No No   Sig: Take 1 Tab by mouth every day.   ziprasidone (GEODON) 40 MG Cap   No No   Sig: TAKE ONE CAPSULE BY MOUTH TWICE DAILY      Facility-Administered Medications: None        Allergies: Allergies have been reviewed with patient.  Allergies  "  Allergen Reactions   • Depakote [Divalproex Sodium] Unspecified     Muscle spasms/muscle pain and weakness     • Doxycycline Anaphylaxis and Vomiting     RXN=unknown   • Amitriptyline Unspecified     Headaches     • Aripiprazole [Abilify] Unspecified     Headaches/muscle twitching     • Clindamycin Nausea     Even with food     • Flagyl [Metronidazole Hcl] Unspecified     \"eye problems\"     • Flomax [Tamsulosin Hydrochloride] Swelling   • Levaquin Unspecified     Severe muscle cramps in legs  RXN=unknown   • Metformin Unspecified     Increased lactic acid      • Tape Rash     Tears skin off  coban with Tegaderm tape ok intermittently  RXN=ongoing   • Vancomycin Itching     Pt becomes flushed in face and chest.   RXN=7/10/16   • Wound Dressing Adhesive Hives     By pt report   • Ciprofloxacin    • Depakote  [Divalproex Sodium]    • Hydromorphone Hcl      Pt states she feels loopy   • Kdc:Metronidazole+Tartrazine    • Keflex Rash     Pt states she gets a rash with this medication  Tolerates ceftriaxone   • Levofloxacin Unspecified     Leg muscle cramps   • Penicillins        Family History: Reviewd  family history includes Genitourinary () Problems in her sister; Heart Disease in her maternal grandmother and mother; Hypertension in her maternal grandmother, maternal uncle, and mother; No Known Problems in her sister; Other in her mother and sister; Sleep Apnea in her mother.     Social History:   Tobacco: never.   Alcohol: No EtOH use   Recreational drugs (illegal and prescription):  No   Activity Level: wheel chair bound.   Living situation:   Recent travel:  No  Primary Care Provider: reviewed Torres Brody M.D.  Other (stressors, spirituality, exposures):  no  Physical Exam:   Vitals:  Temp:  [36.6 °C (97.8 °F)-36.7 °C (98 °F)] 36.7 °C (98 °F)  Pulse:  [69-80] 70  Resp:  [17-21] 18  BP: (122-147)/(63-83) 126/63  SpO2:  [100 %] 100 %    Physical Exam  Vitals signs and nursing note reviewed.   Constitutional:  "      Appearance: She is obese. She is ill-appearing. She is not toxic-appearing.   HENT:      Head: Normocephalic and atraumatic.      Mouth/Throat:      Mouth: Mucous membranes are moist.      Pharynx: Oropharynx is clear.   Eyes:      General: No scleral icterus.        Right eye: No discharge.         Left eye: No discharge.      Conjunctiva/sclera: Conjunctivae normal.      Pupils: Pupils are equal, round, and reactive to light.   Neck:      Musculoskeletal: No neck rigidity.   Cardiovascular:      Rate and Rhythm: Normal rate and regular rhythm.      Pulses: Normal pulses.      Heart sounds: Normal heart sounds. No murmur. No friction rub.   Pulmonary:      Effort: Pulmonary effort is normal.      Breath sounds: Normal breath sounds.   Abdominal:      General: Bowel sounds are normal. There is no distension.      Palpations: Abdomen is soft.      Tenderness: There is no abdominal tenderness.      Hernia: No hernia is present.   Musculoskeletal:         General: No swelling.   Neurological:      Mental Status: She is alert and oriented to person, place, and time.      Cranial Nerves: No cranial nerve deficit.      Sensory: No sensory deficit.      Motor: Weakness (power grade 3/5 in UE & LE b/l.) present.      Gait: Abnormal gait: unable to assess.   Psychiatric:         Mood and Affect: Mood normal.         Labs:   Lab Results   Component Value Date/Time    WBC 4.9 05/19/2020 03:27 AM    WBC 6.1 07/20/2010 11:00 AM    RBC 4.12 (L) 05/19/2020 03:27 AM    RBC 4.38 07/20/2010 11:00 AM    HEMOGLOBIN 12.6 05/19/2020 03:27 AM    HEMATOCRIT 40.6 05/19/2020 03:27 AM    MCV 98.5 (H) 05/19/2020 03:27 AM    MCV 93 07/20/2010 11:00 AM    MCH 30.6 05/19/2020 03:27 AM    MCH 30.1 07/20/2010 11:00 AM    MCHC 31.0 (L) 05/19/2020 03:27 AM    MPV 11.4 05/19/2020 03:27 AM    NEUTSPOLYS 62.50 05/19/2020 03:27 AM    LYMPHOCYTES 26.10 05/19/2020 03:27 AM    MONOCYTES 10.00 05/19/2020 03:27 AM    EOSINOPHILS 0.80 05/19/2020 03:27 AM  "   BASOPHILS 0.20 05/19/2020 03:27 AM    ANISOCYTOSIS 1+ 07/08/2019 04:04 PM      Lab Results   Component Value Date/Time    SODIUM 139 05/19/2020 03:27 AM    POTASSIUM 3.7 05/19/2020 03:27 AM    CHLORIDE 111 05/19/2020 03:27 AM    CO2 16 (L) 05/19/2020 03:27 AM    GLUCOSE 88 05/19/2020 03:27 AM    BUN 17 05/19/2020 03:27 AM    CREATININE 0.83 05/19/2020 03:27 AM    CREATININE 0.75 (L) 07/20/2010 11:00 AM    BUNCREATRAT 19 07/20/2010 11:00 AM    GLOMRATE >59 07/20/2010 11:00 AM      Lab Results   Component Value Date/Time    PROTHROMBTM 13.1 05/19/2020 03:27 AM    INR 0.97 05/19/2020 03:27 AM        EKG: N/A.    Imaging:   DX-CHEST-PORTABLE (1 VIEW)   Final Result      Diminishing lung volumes with no radiographic evidence of acute cardiopulmonary process.      MR-BRAIN-WITH & W/O    (Results Pending)   MR-ORBITS,FACE,NECK-WITH&W/O & SEQUENCES    (Results Pending)       Previous Data Review: reviewed    * Generalized weakness  Assessment & Plan  - patient with extensive neurological history ( transverse myelitis , recurrent optic neuritis,hemiplegic migraine).  - C/O generalized body weakness ( \"limbs feel slike lead\") started suddenly when he woke up last night at around 12:00 am.  - associated with nausea, headache and blurring of vision.   - Physical exam with decreased power in upper and lower extremities.  - neurology on board, MRI contraindicated. Neurology to evaluate patient and decide on steroid.   @Plan:  - Admit to obs.  - Fall/seizure/aspiration precaution.  - Neurocheck q4h.  - Neurology consult.  - Follow neurology recs.      Intracranial hypertension- (present on admission)  Assessment & Plan  Recurrent idiopathic intracranial hypertension unassociated with papilledema  Was previously treated with lumbar puncture, still on diamox  Continue home dose    Diabetes mellitus type 2 in obese (HCC)- (present on admission)  Assessment & Plan  Diabetes type 2  Most recent Hemoglobin A1c is 6 on 8/29/2019  On " Trulicity and insulin glargin at home  Will hold Trulicity  while inpatient.  Will adjust the dose of insulin while inpatient.  Hypoglycemia protocol.  Diabetic diet.  Glucose monitoring    Hypothyroidism- (present on admission)  Assessment & Plan  TSH 1.46  On Levothyroxine 100 mcg.  Continue levothyroxine    Depression- (present on admission)  Assessment & Plan  On prozac and Buspar.  Stable.

## 2020-05-19 NOTE — CARE PLAN
Problem: Communication  Goal: The ability to communicate needs accurately and effectively will improve  Outcome: PROGRESSING AS EXPECTED  Intervention: [place] patient and significant other/support system to call light to alert staff of needs  Flowsheets (Taken 5/19/2020 1121)  Oriented to:: All of the Following : Location of Bathroom, Visiting Policy, Unit Routine, Call Light and Bedside Controls, Bedside Rail Policy, Smoking Policy, Rights and Responsibilities, Bedside Report, and Patient Education Notebook  Note: Pt oriented to unit and call light system. Pt verbalized understanding     Problem: Bowel/Gastric:  Goal: Normal bowel function is maintained or improved  Outcome: PROGRESSING SLOWER THAN EXPECTED  Note: Pt pt last BM was 6 days prior to admission. Implemented bowel protocol.

## 2020-05-20 LAB
ALBUMIN SERPL BCP-MCNC: 3.9 G/DL (ref 3.2–4.9)
ALBUMIN/GLOB SERPL: 2.4 G/DL
ALP SERPL-CCNC: 44 U/L (ref 30–99)
ALT SERPL-CCNC: 29 U/L (ref 2–50)
ANION GAP SERPL CALC-SCNC: 9 MMOL/L (ref 7–16)
AST SERPL-CCNC: 13 U/L (ref 12–45)
BASOPHILS # BLD AUTO: 0.4 % (ref 0–1.8)
BASOPHILS # BLD: 0.02 K/UL (ref 0–0.12)
BILIRUB SERPL-MCNC: 0.4 MG/DL (ref 0.1–1.5)
BUN SERPL-MCNC: 14 MG/DL (ref 8–22)
CALCIUM SERPL-MCNC: 8.8 MG/DL (ref 8.5–10.5)
CHLORIDE SERPL-SCNC: 113 MMOL/L (ref 96–112)
CO2 SERPL-SCNC: 16 MMOL/L (ref 20–33)
CREAT SERPL-MCNC: 0.64 MG/DL (ref 0.5–1.4)
EOSINOPHIL # BLD AUTO: 0.03 K/UL (ref 0–0.51)
EOSINOPHIL NFR BLD: 0.7 % (ref 0–6.9)
ERYTHROCYTE [DISTWIDTH] IN BLOOD BY AUTOMATED COUNT: 50.9 FL (ref 35.9–50)
GLOBULIN SER CALC-MCNC: 1.6 G/DL (ref 1.9–3.5)
GLUCOSE BLD-MCNC: 113 MG/DL (ref 65–99)
GLUCOSE BLD-MCNC: 82 MG/DL (ref 65–99)
GLUCOSE BLD-MCNC: 88 MG/DL (ref 65–99)
GLUCOSE BLD-MCNC: 90 MG/DL (ref 65–99)
GLUCOSE SERPL-MCNC: 99 MG/DL (ref 65–99)
HCT VFR BLD AUTO: 38.5 % (ref 37–47)
HGB BLD-MCNC: 12.3 G/DL (ref 12–16)
IMM GRANULOCYTES # BLD AUTO: 0.02 K/UL (ref 0–0.11)
IMM GRANULOCYTES NFR BLD AUTO: 0.4 % (ref 0–0.9)
LYMPHOCYTES # BLD AUTO: 0.92 K/UL (ref 1–4.8)
LYMPHOCYTES NFR BLD: 20.4 % (ref 22–41)
MCH RBC QN AUTO: 30.5 PG (ref 27–33)
MCHC RBC AUTO-ENTMCNC: 31.9 G/DL (ref 33.6–35)
MCV RBC AUTO: 95.5 FL (ref 81.4–97.8)
MONOCYTES # BLD AUTO: 0.32 K/UL (ref 0–0.85)
MONOCYTES NFR BLD AUTO: 7.1 % (ref 0–13.4)
NEUTROPHILS # BLD AUTO: 3.2 K/UL (ref 2–7.15)
NEUTROPHILS NFR BLD: 71 % (ref 44–72)
NRBC # BLD AUTO: 0 K/UL
NRBC BLD-RTO: 0 /100 WBC
PLATELET # BLD AUTO: 151 K/UL (ref 164–446)
PMV BLD AUTO: 11.7 FL (ref 9–12.9)
POTASSIUM SERPL-SCNC: 3.8 MMOL/L (ref 3.6–5.5)
PROT SERPL-MCNC: 5.5 G/DL (ref 6–8.2)
RBC # BLD AUTO: 4.03 M/UL (ref 4.2–5.4)
SODIUM SERPL-SCNC: 138 MMOL/L (ref 135–145)
WBC # BLD AUTO: 4.5 K/UL (ref 4.8–10.8)

## 2020-05-20 PROCEDURE — 94640 AIRWAY INHALATION TREATMENT: CPT

## 2020-05-20 PROCEDURE — 95930 VISUAL EP TEST CNS W/I&R: CPT | Mod: 26 | Performed by: PSYCHIATRY & NEUROLOGY

## 2020-05-20 PROCEDURE — 95930 VISUAL EP TEST CNS W/I&R: CPT | Performed by: PSYCHIATRY & NEUROLOGY

## 2020-05-20 PROCEDURE — 700111 HCHG RX REV CODE 636 W/ 250 OVERRIDE (IP): Performed by: STUDENT IN AN ORGANIZED HEALTH CARE EDUCATION/TRAINING PROGRAM

## 2020-05-20 PROCEDURE — 700101 HCHG RX REV CODE 250: Performed by: STUDENT IN AN ORGANIZED HEALTH CARE EDUCATION/TRAINING PROGRAM

## 2020-05-20 PROCEDURE — A9270 NON-COVERED ITEM OR SERVICE: HCPCS | Performed by: STUDENT IN AN ORGANIZED HEALTH CARE EDUCATION/TRAINING PROGRAM

## 2020-05-20 PROCEDURE — 80053 COMPREHEN METABOLIC PANEL: CPT

## 2020-05-20 PROCEDURE — 82962 GLUCOSE BLOOD TEST: CPT | Mod: 91

## 2020-05-20 PROCEDURE — 99225 PR SUBSEQUENT OBSERVATION CARE,LEVEL II: CPT | Mod: GC | Performed by: INTERNAL MEDICINE

## 2020-05-20 PROCEDURE — 36415 COLL VENOUS BLD VENIPUNCTURE: CPT

## 2020-05-20 PROCEDURE — 700102 HCHG RX REV CODE 250 W/ 637 OVERRIDE(OP): Performed by: STUDENT IN AN ORGANIZED HEALTH CARE EDUCATION/TRAINING PROGRAM

## 2020-05-20 PROCEDURE — G0378 HOSPITAL OBSERVATION PER HR: HCPCS

## 2020-05-20 PROCEDURE — 85025 COMPLETE CBC W/AUTO DIFF WBC: CPT

## 2020-05-20 PROCEDURE — 94760 N-INVAS EAR/PLS OXIMETRY 1: CPT

## 2020-05-20 RX ADMIN — MYCOPHENOLATE MOFETIL 1000 MG: 250 CAPSULE ORAL at 05:11

## 2020-05-20 RX ADMIN — MUPIROCIN 1 APPLICATION: 20 OINTMENT TOPICAL at 05:17

## 2020-05-20 RX ADMIN — BUDESONIDE AND FORMOTEROL FUMARATE DIHYDRATE 2 PUFF: 160; 4.5 AEROSOL RESPIRATORY (INHALATION) at 19:14

## 2020-05-20 RX ADMIN — OXCARBAZEPINE 150 MG: 150 TABLET, FILM COATED ORAL at 05:15

## 2020-05-20 RX ADMIN — IPRATROPIUM BROMIDE AND ALBUTEROL SULFATE 3 ML: .5; 3 SOLUTION RESPIRATORY (INHALATION) at 02:08

## 2020-05-20 RX ADMIN — ACETAMINOPHEN 650 MG: 325 TABLET, FILM COATED ORAL at 07:33

## 2020-05-20 RX ADMIN — POTASSIUM CHLORIDE 20 MEQ: 1500 TABLET, EXTENDED RELEASE ORAL at 05:10

## 2020-05-20 RX ADMIN — MELATONIN 1000 UNITS: at 05:15

## 2020-05-20 RX ADMIN — PREDNISONE 20 MG: 20 TABLET ORAL at 05:15

## 2020-05-20 RX ADMIN — MYCOPHENOLATE MOFETIL 1000 MG: 250 CAPSULE ORAL at 17:26

## 2020-05-20 RX ADMIN — PREGABALIN 300 MG: 100 CAPSULE ORAL at 17:23

## 2020-05-20 RX ADMIN — BUSPIRONE HYDROCHLORIDE 10 MG: 10 TABLET ORAL at 05:15

## 2020-05-20 RX ADMIN — INSULIN GLARGINE 7 UNITS: 100 INJECTION, SOLUTION SUBCUTANEOUS at 17:33

## 2020-05-20 RX ADMIN — ACETAZOLAMIDE 500 MG: 250 TABLET ORAL at 05:14

## 2020-05-20 RX ADMIN — ZIPRASIDONE HYDROCHLORIDE 40 MG: 40 CAPSULE ORAL at 17:25

## 2020-05-20 RX ADMIN — FLUOXETINE HYDROCHLORIDE 40 MG: 20 CAPSULE ORAL at 05:15

## 2020-05-20 RX ADMIN — BUDESONIDE AND FORMOTEROL FUMARATE DIHYDRATE 2 PUFF: 160; 4.5 AEROSOL RESPIRATORY (INHALATION) at 07:33

## 2020-05-20 RX ADMIN — IVABRADINE 7.5 MG: 5 TABLET, FILM COATED ORAL at 23:47

## 2020-05-20 RX ADMIN — ACETAZOLAMIDE 500 MG: 250 TABLET ORAL at 17:33

## 2020-05-20 RX ADMIN — DOCUSATE SODIUM 50 MG AND SENNOSIDES 8.6 MG 2 TABLET: 8.6; 5 TABLET, FILM COATED ORAL at 17:25

## 2020-05-20 RX ADMIN — ACETAMINOPHEN 650 MG: 325 TABLET, FILM COATED ORAL at 13:38

## 2020-05-20 RX ADMIN — ENOXAPARIN SODIUM 40 MG: 100 INJECTION SUBCUTANEOUS at 05:16

## 2020-05-20 RX ADMIN — IPRATROPIUM BROMIDE AND ALBUTEROL SULFATE 3 ML: .5; 3 SOLUTION RESPIRATORY (INHALATION) at 10:35

## 2020-05-20 RX ADMIN — LEVOTHYROXINE SODIUM 100 MCG: 100 TABLET ORAL at 05:03

## 2020-05-20 RX ADMIN — IPRATROPIUM BROMIDE AND ALBUTEROL SULFATE 3 ML: .5; 3 SOLUTION RESPIRATORY (INHALATION) at 19:14

## 2020-05-20 RX ADMIN — OXCARBAZEPINE 150 MG: 150 TABLET, FILM COATED ORAL at 17:25

## 2020-05-20 RX ADMIN — IVABRADINE 7.5 MG: 5 TABLET, FILM COATED ORAL at 07:33

## 2020-05-20 RX ADMIN — BUSPIRONE HYDROCHLORIDE 10 MG: 10 TABLET ORAL at 17:23

## 2020-05-20 RX ADMIN — MUPIROCIN 2 %: 20 OINTMENT TOPICAL at 17:30

## 2020-05-20 RX ADMIN — PREGABALIN 300 MG: 100 CAPSULE ORAL at 05:13

## 2020-05-20 RX ADMIN — IPRATROPIUM BROMIDE AND ALBUTEROL SULFATE 3 ML: .5; 3 SOLUTION RESPIRATORY (INHALATION) at 15:53

## 2020-05-20 RX ADMIN — DOCUSATE SODIUM 50 MG AND SENNOSIDES 8.6 MG 2 TABLET: 8.6; 5 TABLET, FILM COATED ORAL at 07:33

## 2020-05-20 RX ADMIN — TRAZODONE HYDROCHLORIDE 50 MG: 100 TABLET ORAL at 17:28

## 2020-05-20 RX ADMIN — POTASSIUM CHLORIDE 20 MEQ: 1500 TABLET, EXTENDED RELEASE ORAL at 17:28

## 2020-05-20 RX ADMIN — ZIPRASIDONE HYDROCHLORIDE 40 MG: 40 CAPSULE ORAL at 05:14

## 2020-05-20 ASSESSMENT — ENCOUNTER SYMPTOMS
HEARTBURN: 0
SPEECH CHANGE: 0
DIZZINESS: 0
CONSTIPATION: 1
SENSORY CHANGE: 0
TREMORS: 0
ABDOMINAL PAIN: 0
PALPITATIONS: 0
DIAPHORESIS: 1
WEAKNESS: 1
HEMOPTYSIS: 0
HEADACHES: 1
NAUSEA: 1
STRIDOR: 0
NERVOUS/ANXIOUS: 1
VOMITING: 0
WHEEZING: 0
EYE PAIN: 1
SINUS PAIN: 0
SEIZURES: 0
INSOMNIA: 0
EYE REDNESS: 0
EYE DISCHARGE: 0
SORE THROAT: 0
PHOTOPHOBIA: 0
BLURRED VISION: 1
DIARRHEA: 0
NECK PAIN: 0
DOUBLE VISION: 0
BACK PAIN: 0
SPUTUM PRODUCTION: 1
BLOOD IN STOOL: 0
FEVER: 0
SHORTNESS OF BREATH: 0
CHILLS: 0
COUGH: 1
WEIGHT LOSS: 0

## 2020-05-20 NOTE — PROCEDURES
VEP (4 Ch) 16     Trace N75 (ms) P100 (ms) N145 (ms) N100 (ms) V35-B266 (µV)   Ch2 : MO-MF : L 58.2 95.7 160.5 0.0 8.82   Ch4 : MF-A1  : L 0.0 0.0 0.0 96.9 0.00   Ch2 : MO-MF : R 60.2 97.3 163.7 0.0 8.01   Ch4 : MF-A1  : R 0.0 0.0 0.0 96.9 0.00     VEP (4 Ch) 8     Trace N75 (ms) P100 (ms) N145 (ms) N100 (ms) C04-D390 (µV)   Ch2 : MO-MF : L 58.6 99.2 168.4 0.0 4.35   Ch4 : MF-A1  : L 0.0 0.0 0.0 93.8 0.00   Ch2 : MO-MF : R 57.0 95.7 177.3 0.0 7.61   Ch4 : MF-A1  : R 0.0 0.0 0.0 93.0 0.00     Visual evoked potentials are obtained with patterns shift stimuli in each eye.  Patient has a history of visual blurring.  The study was done to exclude optic nerve dysfunction.    Waveforms are well developed bilaterally.  P100 latencies are noted at 95.7 ms on the left and 97.3 ms on the right with a 16 x 16 pattern shift stimuli.    Impression:    Normal visual evoked responses bilaterally.  Clinical correlation is suggested.

## 2020-05-20 NOTE — PROGRESS NOTES
Assumed care of patient at bedside report from NOC RN. Updated on POC. Patient currently A & O x 4; on room air; up max assist; with complaints of left hip discomfort and headache. Call light within reach. Whiteboard updated. Fall precautions in place. Bed locked and in lowest position. All questions answered. No other needs indicated at this time.

## 2020-05-20 NOTE — CARE PLAN
Problem: Safety  Goal: Will remain free from falls  Outcome: PROGRESSING AS EXPECTED  Pt calls appropriately. Call light within reach. Hourly rounding practiced.     Problem: Bowel/Gastric:  Goal: Normal bowel function is maintained or improved  Outcome: PROGRESSING AS EXPECTED  Last BM 5/13, Bowel protocol started. Bowel sounds present.      Problem: Pain Management  Goal: Pain level will decrease to patient's comfort goal  Outcome: PROGRESSING AS EXPECTED  C/o 7/10 headache , Tylenol given with good result.

## 2020-05-20 NOTE — PROGRESS NOTES
Report received from Ignacia HASSAN. Assumed care. Received on her room laying on her bed, awake slightly anxious. POC updated. Pt to CT-scan with ticket to ride in placed.

## 2020-05-20 NOTE — PROGRESS NOTES
Daily Progress Note:     Date of Service: 5/20/2020  Primary Team: [facility]   Attending: Spencer Rios M.D.   Senior Resident: Dr. Garnica  Intern:   Contact:  [phone]    Chief Complaint:   Optic neuritis with nonfocal weakness and blurring of vision      Subjective:  - No acute overnight events  - Pt. reports her weakness in B/L upper and Lower extremities has progressed compared to yesterday and also reports subjective burning sensation to touch in all 4 extremities which is new and started last evening. She also reports headache behind her left eye which goes to the forehead and then radiates back to the left eye  - CT head W&W/O was not remarkable for any enhancement, mass or other abnormality  - Ct left hip was negative for fracture or dislocation  - Pt. will be getting VEP study done today in the morning  - Spoke to Neurology() who informs that her weakness is functional but not organic and that she was worked up multiple times in the past but have no interventions to offer at this time and recommended following up with ((Neuro-opthlalmology) recommendations post getting VEP study done.    Consultants/Specialty:  Neurology: Dr. Rincon  Neuro 0phthalmology .    Review of Systems:   Review of Systems   Constitutional: Positive for diaphoresis and malaise/fatigue. Negative for chills, fever and weight loss.   HENT: Negative for congestion, nosebleeds, sinus pain, sore throat and tinnitus.         Reports episodic pain behind the left eye   Eyes: Positive for blurred vision and pain. Negative for double vision, photophobia, discharge and redness.   Respiratory: Positive for cough and sputum production. Negative for hemoptysis, shortness of breath, wheezing and stridor.    Cardiovascular: Negative for chest pain, palpitations and leg swelling.   Gastrointestinal: Positive for constipation and nausea. Negative for abdominal pain, blood in stool,  diarrhea, heartburn and vomiting.   Genitourinary: Negative for dysuria, frequency and urgency.   Musculoskeletal: Negative for back pain, joint pain and neck pain.   Skin: Negative for itching and rash.   Neurological: Positive for weakness and headaches. Negative for dizziness, tremors, sensory change, speech change and seizures.   Psychiatric/Behavioral: The patient is nervous/anxious. The patient does not have insomnia.        Objective Data:   Physical Exam:   Vitals:   Temp:  [36.1 °C (97 °F)-36.6 °C (97.8 °F)] 36.6 °C (97.8 °F)  Pulse:  [67-85] 71  Resp:  [15-18] 18  BP: (115-131)/(63-86) 120/86  SpO2:  [95 %-100 %] 95 %     Physical Exam  Constitutional:       Appearance: Normal appearance.   HENT:      Head: Normocephalic and atraumatic.      Nose: Nose normal. No congestion or rhinorrhea.      Mouth/Throat:      Mouth: Mucous membranes are dry.   Eyes:      Extraocular Movements: Extraocular movements intact.      Pupils: Pupils are equal, round, and reactive to light.   Cardiovascular:      Rate and Rhythm: Normal rate and regular rhythm.      Heart sounds: Normal heart sounds. No murmur. No friction rub. No gallop.    Pulmonary:      Effort: Pulmonary effort is normal. No respiratory distress.      Breath sounds: Normal breath sounds. No stridor.   Abdominal:      General: There is distension.      Palpations: Abdomen is soft.      Tenderness: There is no abdominal tenderness. There is no guarding.   Musculoskeletal: Normal range of motion.   Skin:     General: Skin is warm and dry.   Neurological:      Mental Status: She is alert and oriented to person, place, and time.      Comments: Grade-2/5 both upper and lower extremities   Psychiatric:         Mood and Affect: Mood normal.         Behavior: Behavior normal.         * Generalized weakness  Assessment & Plan  Patient with extensive neurological history ( transverse myelitis , recurrent optic neuritis,hemiplegic migraine).  C/O generalized body  "weakness ( \"limbs feel slike lead\") started suddenly when he woke up on the day of admission.  Associated with nausea, headache and blurring of vision.   Physical exam with decreased power in upper and lower extremities.   MRI contraindicated.  CT head with and without contrast negative for enhancement, mass or other abnormality    Plan:  - Admit to obs.  - Fall/seizure/aspiration precaution.  - Neurocheck q4h.  - Neurology consulted waiting formal recommendations  - Follow neurology recs.  -On oral prednisolone 20 mg daily    Intracranial hypertension- (present on admission)  Assessment & Plan  Recurrent idiopathic intracranial hypertension unassociated with papilledema and intermittent headaches  Was previously treated with lumbar puncture, and is on  acetazolamide  We will continue home dose of acetazolamide    Diabetes mellitus type 2 in obese (HCC)- (present on admission)  Assessment & Plan  Diabetes type 2  Most recent Hemoglobin A1c is 6 on 8/29/2019  -Trulicity held and insulin glargine started with insulin aspart in  hospital with glycemia protocol in place.  -Point of control blood glucose monitoring and diabetic diet.      Hypothyroidism- (present on admission)  Assessment & Plan  TSH 1.46  On Levothyroxine 100 mcg.  Continue levothyroxine    Depression- (present on admission)  Assessment & Plan  On prozac and Buspar.  Stable.    "

## 2020-05-20 NOTE — CARE PLAN
Problem: Knowledge Deficit  Goal: Knowledge of disease process/condition, treatment plan, diagnostic tests, and medications will improve  Outcome: PROGRESSING AS EXPECTED  Intervention: Assess knowledge level of disease process/condition, treatment plan, diagnostic tests, and medications  Note: Discussed daily POC. Discussed pending tests. Pt asked appropriate questions. Verbalized understanding     Problem: Skin Integrity  Goal: Risk for impaired skin integrity will decrease  Outcome: PROGRESSING AS EXPECTED  Intervention: Implement precautions to protect skin integrity in collaboration with the interdisciplinary team  Flowsheets (Taken 5/20/2020 1000)  Skin Preventative Measures: Pillows in Use for Support / Positioning  Bed Types: Pressure Redistribution Mattress (Atmosair)  Friction Interventions: Draw Sheet / Pad Used for Repositioning  Moisturizers:   Barrier Cream   Moisturizer  Note: Skin breakdown measures in place: q2 turn; moisturizer; barrier cream; pillows for positioning

## 2020-05-21 VITALS
TEMPERATURE: 97.3 F | WEIGHT: 265.21 LBS | OXYGEN SATURATION: 98 % | HEIGHT: 65 IN | DIASTOLIC BLOOD PRESSURE: 63 MMHG | HEART RATE: 72 BPM | RESPIRATION RATE: 18 BRPM | BODY MASS INDEX: 44.19 KG/M2 | SYSTOLIC BLOOD PRESSURE: 121 MMHG

## 2020-05-21 LAB
ALBUMIN SERPL BCP-MCNC: 4 G/DL (ref 3.2–4.9)
ALBUMIN/GLOB SERPL: 2.4 G/DL
ALP SERPL-CCNC: 42 U/L (ref 30–99)
ALT SERPL-CCNC: 23 U/L (ref 2–50)
ANION GAP SERPL CALC-SCNC: 14 MMOL/L (ref 7–16)
AST SERPL-CCNC: 9 U/L (ref 12–45)
BASOPHILS # BLD AUTO: 0.4 % (ref 0–1.8)
BASOPHILS # BLD: 0.02 K/UL (ref 0–0.12)
BILIRUB SERPL-MCNC: 0.2 MG/DL (ref 0.1–1.5)
BUN SERPL-MCNC: 21 MG/DL (ref 8–22)
CALCIUM SERPL-MCNC: 9.3 MG/DL (ref 8.5–10.5)
CHLORIDE SERPL-SCNC: 109 MMOL/L (ref 96–112)
CO2 SERPL-SCNC: 16 MMOL/L (ref 20–33)
CREAT SERPL-MCNC: 0.71 MG/DL (ref 0.5–1.4)
EOSINOPHIL # BLD AUTO: 0.06 K/UL (ref 0–0.51)
EOSINOPHIL NFR BLD: 1.2 % (ref 0–6.9)
ERYTHROCYTE [DISTWIDTH] IN BLOOD BY AUTOMATED COUNT: 50.1 FL (ref 35.9–50)
GLOBULIN SER CALC-MCNC: 1.7 G/DL (ref 1.9–3.5)
GLUCOSE BLD-MCNC: 114 MG/DL (ref 65–99)
GLUCOSE BLD-MCNC: 93 MG/DL (ref 65–99)
GLUCOSE SERPL-MCNC: 91 MG/DL (ref 65–99)
HCT VFR BLD AUTO: 38.3 % (ref 37–47)
HGB BLD-MCNC: 12.3 G/DL (ref 12–16)
IL6 SERPL-MCNC: <2 PG/ML
IMM GRANULOCYTES # BLD AUTO: 0.04 K/UL (ref 0–0.11)
IMM GRANULOCYTES NFR BLD AUTO: 0.8 % (ref 0–0.9)
LYMPHOCYTES # BLD AUTO: 2.06 K/UL (ref 1–4.8)
LYMPHOCYTES NFR BLD: 40.4 % (ref 22–41)
MCH RBC QN AUTO: 30.4 PG (ref 27–33)
MCHC RBC AUTO-ENTMCNC: 32.1 G/DL (ref 33.6–35)
MCV RBC AUTO: 94.8 FL (ref 81.4–97.8)
MONOCYTES # BLD AUTO: 0.48 K/UL (ref 0–0.85)
MONOCYTES NFR BLD AUTO: 9.4 % (ref 0–13.4)
NEUTROPHILS # BLD AUTO: 2.44 K/UL (ref 2–7.15)
NEUTROPHILS NFR BLD: 47.8 % (ref 44–72)
NRBC # BLD AUTO: 0 K/UL
NRBC BLD-RTO: 0 /100 WBC
PLATELET # BLD AUTO: 150 K/UL (ref 164–446)
PMV BLD AUTO: 11.3 FL (ref 9–12.9)
POTASSIUM SERPL-SCNC: 3.6 MMOL/L (ref 3.6–5.5)
PROT SERPL-MCNC: 5.7 G/DL (ref 6–8.2)
RBC # BLD AUTO: 4.04 M/UL (ref 4.2–5.4)
SODIUM SERPL-SCNC: 139 MMOL/L (ref 135–145)
WBC # BLD AUTO: 5.1 K/UL (ref 4.8–10.8)

## 2020-05-21 PROCEDURE — 700101 HCHG RX REV CODE 250: Performed by: STUDENT IN AN ORGANIZED HEALTH CARE EDUCATION/TRAINING PROGRAM

## 2020-05-21 PROCEDURE — 82962 GLUCOSE BLOOD TEST: CPT | Mod: 91

## 2020-05-21 PROCEDURE — 94640 AIRWAY INHALATION TREATMENT: CPT

## 2020-05-21 PROCEDURE — 36415 COLL VENOUS BLD VENIPUNCTURE: CPT

## 2020-05-21 PROCEDURE — A9270 NON-COVERED ITEM OR SERVICE: HCPCS | Performed by: STUDENT IN AN ORGANIZED HEALTH CARE EDUCATION/TRAINING PROGRAM

## 2020-05-21 PROCEDURE — 700111 HCHG RX REV CODE 636 W/ 250 OVERRIDE (IP): Performed by: STUDENT IN AN ORGANIZED HEALTH CARE EDUCATION/TRAINING PROGRAM

## 2020-05-21 PROCEDURE — 99217 PR OBSERVATION CARE DISCHARGE: CPT | Mod: GC | Performed by: INTERNAL MEDICINE

## 2020-05-21 PROCEDURE — 85025 COMPLETE CBC W/AUTO DIFF WBC: CPT

## 2020-05-21 PROCEDURE — 700102 HCHG RX REV CODE 250 W/ 637 OVERRIDE(OP): Performed by: STUDENT IN AN ORGANIZED HEALTH CARE EDUCATION/TRAINING PROGRAM

## 2020-05-21 PROCEDURE — 95930 VISUAL EP TEST CNS W/I&R: CPT | Performed by: SPECIALIST

## 2020-05-21 PROCEDURE — 95930 VISUAL EP TEST CNS W/I&R: CPT | Mod: 26 | Performed by: SPECIALIST

## 2020-05-21 PROCEDURE — 80053 COMPREHEN METABOLIC PANEL: CPT

## 2020-05-21 PROCEDURE — G0378 HOSPITAL OBSERVATION PER HR: HCPCS

## 2020-05-21 RX ORDER — ACETAZOLAMIDE 250 MG/1
1000 TABLET ORAL EVERY MORNING
Status: DISCONTINUED | OUTPATIENT
Start: 2020-05-22 | End: 2020-05-21 | Stop reason: HOSPADM

## 2020-05-21 RX ORDER — ACETAZOLAMIDE 250 MG/1
1500 TABLET ORAL EVERY EVENING
Status: DISCONTINUED | OUTPATIENT
Start: 2020-05-21 | End: 2020-05-21 | Stop reason: HOSPADM

## 2020-05-21 RX ORDER — ACETAMINOPHEN 500 MG
1000 TABLET ORAL ONCE
Status: COMPLETED | OUTPATIENT
Start: 2020-05-21 | End: 2020-05-21

## 2020-05-21 RX ORDER — ACETAMINOPHEN 500 MG
500 TABLET ORAL 4 TIMES DAILY PRN
Qty: 60 TAB | Refills: 0 | Status: SHIPPED
Start: 2020-05-21 | End: 2020-08-15

## 2020-05-21 RX ORDER — ACETAZOLAMIDE 250 MG/1
TABLET ORAL 2 TIMES DAILY
Status: DISCONTINUED | OUTPATIENT
Start: 2020-05-21 | End: 2020-05-21

## 2020-05-21 RX ORDER — ACETAZOLAMIDE 250 MG/1
1500 TABLET ORAL EVERY MORNING
Status: DISCONTINUED | OUTPATIENT
Start: 2020-05-21 | End: 2020-05-21

## 2020-05-21 RX ORDER — ACETAZOLAMIDE 250 MG/1
1000 TABLET ORAL EVERY EVENING
Status: DISCONTINUED | OUTPATIENT
Start: 2020-05-21 | End: 2020-05-21

## 2020-05-21 RX ORDER — PREDNISONE 20 MG/1
20 TABLET ORAL DAILY
Qty: 40 TAB | Refills: 0 | Status: SHIPPED
Start: 2020-05-21 | End: 2020-08-15

## 2020-05-21 RX ORDER — PREDNISONE 20 MG/1
20 TABLET ORAL DAILY
Qty: 40 TAB | Refills: 0 | Status: SHIPPED | OUTPATIENT
Start: 2020-05-21 | End: 2020-05-21 | Stop reason: SDUPTHER

## 2020-05-21 RX ADMIN — IPRATROPIUM BROMIDE AND ALBUTEROL SULFATE 3 ML: .5; 3 SOLUTION RESPIRATORY (INHALATION) at 15:06

## 2020-05-21 RX ADMIN — MUPIROCIN 1 G: 20 OINTMENT TOPICAL at 04:17

## 2020-05-21 RX ADMIN — LEVOTHYROXINE SODIUM 100 MCG: 100 TABLET ORAL at 04:16

## 2020-05-21 RX ADMIN — IPRATROPIUM BROMIDE AND ALBUTEROL SULFATE 3 ML: .5; 3 SOLUTION RESPIRATORY (INHALATION) at 03:47

## 2020-05-21 RX ADMIN — ACETAMINOPHEN 1000 MG: 500 TABLET ORAL at 14:00

## 2020-05-21 RX ADMIN — POTASSIUM CHLORIDE 20 MEQ: 1500 TABLET, EXTENDED RELEASE ORAL at 04:17

## 2020-05-21 RX ADMIN — BUSPIRONE HYDROCHLORIDE 10 MG: 10 TABLET ORAL at 04:16

## 2020-05-21 RX ADMIN — ZIPRASIDONE HYDROCHLORIDE 40 MG: 40 CAPSULE ORAL at 04:15

## 2020-05-21 RX ADMIN — OXCARBAZEPINE 150 MG: 150 TABLET, FILM COATED ORAL at 04:15

## 2020-05-21 RX ADMIN — IPRATROPIUM BROMIDE AND ALBUTEROL SULFATE 3 ML: .5; 3 SOLUTION RESPIRATORY (INHALATION) at 08:42

## 2020-05-21 RX ADMIN — MELATONIN 1000 UNITS: at 04:16

## 2020-05-21 RX ADMIN — ACETAMINOPHEN 650 MG: 325 TABLET, FILM COATED ORAL at 08:33

## 2020-05-21 RX ADMIN — ONDANSETRON 4 MG: 4 TABLET, ORALLY DISINTEGRATING ORAL at 04:16

## 2020-05-21 RX ADMIN — FLUOXETINE HYDROCHLORIDE 40 MG: 20 CAPSULE ORAL at 04:16

## 2020-05-21 RX ADMIN — MYCOPHENOLATE MOFETIL 1000 MG: 250 CAPSULE ORAL at 04:15

## 2020-05-21 RX ADMIN — BUDESONIDE AND FORMOTEROL FUMARATE DIHYDRATE 2 PUFF: 160; 4.5 AEROSOL RESPIRATORY (INHALATION) at 08:42

## 2020-05-21 RX ADMIN — ACETAMINOPHEN 650 MG: 325 TABLET, FILM COATED ORAL at 01:55

## 2020-05-21 RX ADMIN — IVABRADINE 7.5 MG: 5 TABLET, FILM COATED ORAL at 08:33

## 2020-05-21 RX ADMIN — PREDNISONE 20 MG: 20 TABLET ORAL at 04:16

## 2020-05-21 RX ADMIN — DOCUSATE SODIUM 50 MG AND SENNOSIDES 8.6 MG 2 TABLET: 8.6; 5 TABLET, FILM COATED ORAL at 04:15

## 2020-05-21 RX ADMIN — ACETAZOLAMIDE 500 MG: 250 TABLET ORAL at 04:15

## 2020-05-21 RX ADMIN — ENOXAPARIN SODIUM 40 MG: 100 INJECTION SUBCUTANEOUS at 04:17

## 2020-05-21 RX ADMIN — PREGABALIN 300 MG: 100 CAPSULE ORAL at 04:15

## 2020-05-21 NOTE — PROGRESS NOTES
Report received from SONYA Pal. All cares taken over at this time by this RN. Patient resting in bed. Call light within reach. No complaints at this time.

## 2020-05-21 NOTE — DISCHARGE SUMMARY
Discharge Summary    Date of Admission: 5/19/2020  Date of Discharge: 5/21/2020  Discharging Attending: Spencer Rios M.D.   Discharging Senior Resident:   Discharging Intern: Diony Aldridge    CHIEF COMPLAINT ON ADMISSION  Chief Complaint   Patient presents with   • Musculoskeletal Pain     hx of transverse myletis, states that a few days ago she fell to the left hip and she thinks this is what caused her flare. pt states that all of her limbs feel heavy. pt c/o decreased mobility to bilateral arms.        Reason for Admission  Weakness with heaviness in both upper and lower extremities    Admission Date  5/19/2020    CODE STATUS  Full Code    HPI & HOSPITAL COURSE  Kristin Balderrama is a 29 y.o. female with complex PMH of Chronic relapsing inflammatory optic neuropathy (CRION),  hypothyroidism, DM2, TONYA, obesity, Asthma, chronic hip and back pain, SVT s/p ablation, hemiplegic migraine, intracranial hypertension, and multiple psychiatric issues,transverse myelitis who presented to the ED at [facility] with heaviness in the upper and lower extremities and associated headache with blurring of vision, and with a sensation of her body shutting down.  Patient was treated for pneumonia at outside facility prior to arrival on Z-Ilya and doxycycline and had a COVID-19 test done which was negative on presentation.  In the emergency department patient had stable vitals, unremarkable chest x-ray and normal labs.  Neurology was called on presentation for evaluation and ordered a brain MRI and orbits with and without contrast which could not be done due to the present soft neurostimulator in her spine.  Patient follows up for optic neuritis with neuro ophthalmology Dr. Cabral as out patient who was contacted, after admission on the neurology floor who advised continuation of prednisone 20 mg daily and testing for visual evoked potentials large and small target and CT scan of the brain  with and without contrast which was unremarkable and revealed no acute intracranial abnormality or mass or abnormal enhancement within the brain.  Patient had Visual evoked potential testing done with normal visual evoked responses bilaterally noted.  Case was discussed with neurology-, who are  following patient, who have no new recommendations at this point of time on this admission  Patient not found to be moving much at home with difficulty walking due to lack of effort, but can transfer to wheelchair and get around at home  Case discussed with Dr. Cabral, neuroophthalmologist prior to discharge.  Result evaluation of visual evoked potential study and CT scan of the head reviewed by neuro-ophthalmology who advise discharge of the patient with follow-up with neurology as an outpatient on 30 June 2020 and with neuro ophthalmology on 29 June 2020 at their offices  Patient to be discharged on 20 mg of prednisone steroid dosage.  Patient will follow-up with neurology on 30 June 2020 at 9:30 AM  their office  Therefore, she is discharged in fair and stable condition to home with close outpatient follow-up.    The patient met 2-midnight criteria for an inpatient stay at the time of discharge.     Discharge Date  5/21/2020    FOLLOW UP ITEMS POST DISCHARGE  Follow-up with Dr. Yousif as outpatient,    DISCHARGE DIAGNOSES  Principal Problem:    Generalized weakness POA: Unknown  Active Problems:    Intracranial hypertension POA: Yes    Diabetes mellitus type 2 in obese (HCC) POA: Yes    Depression POA: Yes    Hypothyroidism (Chronic) POA: Yes  Resolved Problems:    * No resolved hospital problems. *      FOLLOW UP  Future Appointments   Date Time Provider Department Center   6/30/2020  9:40 AM Tamiko Jernigan M.D. [facility] None         MEDICATIONS ON DISCHARGE     Medication List      START taking these medications      Instructions   acetaminophen 500 MG Tabs  Commonly known as:  TYLENOL   Take 1 Tab by mouth  4 times a day as needed for Mild Pain, Moderate Pain or Fever (Mild Pain; (Pain scale 1-3); Temp greater than 100.5 F).  Dose:  500 mg        CONTINUE taking these medications      Instructions   acetaZOLAMIDE 250 MG Tabs  Commonly known as:  DIAMOX   Take 1,000-1,500 mg by mouth 2 times a day. 1500 mg in the AM  1000 mg in the PM  Dose:  1,000-1,500 mg     albuterol 108 (90 Base) MCG/ACT Aers inhalation aerosol   Inhale 2 Puffs by mouth every 6 hours as needed for Shortness of Breath.  Dose:  2 Puff     aspirin EC 81 MG Tbec  Commonly known as:  ECOTRIN   Take 81 mg by mouth every day.  Dose:  81 mg     azithromycin 250 MG Tabs  Commonly known as:  ZITHROMAX   Take 250-500 mg by mouth every day. Z-Ilya  Dose:  250-500 mg     budesonide-formoterol 160-4.5 MCG/ACT Aero  Commonly known as:  SYMBICORT   Inhale 2 Puffs by mouth 2 Times a Day.  Dose:  2 Puff     busPIRone 10 MG Tabs tablet  Commonly known as:  BUSPAR   TAKE ONE TABLET BY MOUTH TWICE DAILY     CellCept 500 MG tablet  Generic drug:  mycophenolate   Take 1,000 mg by mouth 2 times a day.  Dose:  1,000 mg     Cholecalciferol 1000 UNIT Tabs  Commonly known as:  VITAMIND D3   Doctor's comments:  Follow PCP and Recheck  Take 1 Tab by mouth every day.  Dose:  1,000 Units     Corlanor 7.5 MG Tabs  Generic drug:  Ivabradine HCl   Take 7.5 mg by mouth 2 Times a Day. Needs to schedule follow up with Dr. Wilde  Dose:  7.5 mg     diclofenac EC 50 MG Tbec  Commonly known as:  VOLTAREN   Take 50 mg by mouth 3 times a day.  Dose:  50 mg     diphenhydrAMINE 12.5 MG/5ML Liqd liquid  Commonly known as:  BENADRYL   Take 25 mg by mouth 2 times daily with meals as needed. With the Doxycycline  Dose:  25 mg     doxycycline 100 MG Tabs  Commonly known as:  VIBRAMYCIN   Take 100 mg by mouth 2 times a day. 10 day course  Dose:  100 mg     fluoxetine 40 MG capsule  Commonly known as:  PROZAC   TAKE ONE CAPSULE BY MOUTH ONCE A DAY     furosemide 80 MG Tabs  Commonly known as:   LASIX   Take 80 mg by mouth 1 time daily as needed.  Dose:  80 mg     ipratropium-albuterol 0.5-2.5 (3) MG/3ML nebulizer solution  Commonly known as:  DUONEB   3 mL by Nebulization route every 6 hours as needed for Shortness of Breath.  Dose:  3 mL     lactulose 10 GM/15ML Soln   Take 15 mL by mouth 2 times a day.  Dose:  10 g     Lantus SoloStar 100 UNIT/ML Sopn injection  Generic drug:  insulin glargine   Inject 15 Units as instructed every evening.  Dose:  15 Units     levothyroxine 100 MCG Tabs  Commonly known as:  SYNTHROID   Take 1 Tab by mouth Every morning on an empty stomach.  Dose:  100 mcg     Lyrica 300 MG capsule  Generic drug:  pregabalin   Take 300 mg by mouth 2 times a day.  Dose:  300 mg     Melatonin 10 MG Tabs   Take 10 mg by mouth every evening.  Dose:  10 mg     modafinil 200 MG Tabs  Commonly known as:  PROVIGIL   Take 1 Tab by mouth every morning for 60 days.  Dose:  200 mg     Nexplanon 68 MG Impl implant  Generic drug:  etonogestrel   Inject 1 Each as instructed Once.  Dose:  1 Each     ondansetron 4 MG Tbdp  Commonly known as:  ZOFRAN ODT   Take 4 mg by mouth every 6 hours as needed for Nausea.  Dose:  4 mg     OXcarbazepine 150 MG Tabs  Commonly known as:  TRILEPTAL   Take 1 Tab by mouth 2 Times a Day.  Dose:  150 mg     potassium chloride SA 20 MEQ Tbcr  Commonly known as:  Kdur   Take 20 mEq by mouth 2 times a day.  Dose:  20 mEq     predniSONE 20 MG Tabs  Commonly known as:  DELTASONE   Take 1 Tab by mouth every day.  Dose:  20 mg     senna-docusate 8.6-50 MG Tabs  Commonly known as:  PERICOLACE or SENOKOT S   Take 2 Tabs by mouth 2 times a day.  Dose:  2 Tab     tiotropium 18 MCG Caps  Commonly known as:  SPIRIVA   Inhale 1 Cap by mouth every day.  Dose:  18 mcg     traZODone 100 MG Tabs  Commonly known as:  DESYREL   Take 0.5 Tabs by mouth every evening.  Dose:  50 mg     Trulicity 1.5 MG/0.5ML Sopn  Generic drug:  Dulaglutide   Inject 1.5 mg as instructed every Friday.  Dose:  1.5  "mg     ziprasidone 40 MG Caps  Commonly known as:  GEODON   TAKE ONE CAPSULE BY MOUTH TWICE DAILY            Allergies  Allergies   Allergen Reactions   • Depakote [Divalproex Sodium] Unspecified     Muscle spasms/muscle pain and weakness     • Doxycycline Anaphylaxis and Vomiting     RXN=unknown   • Amitriptyline Unspecified     Headaches     • Aripiprazole [Abilify] Unspecified     Headaches/muscle twitching     • Clindamycin Nausea     Even with food     • Flagyl [Metronidazole Hcl] Unspecified     \"eye problems\"     • Flomax [Tamsulosin Hydrochloride] Swelling   • Levaquin Unspecified     Severe muscle cramps in legs  RXN=unknown   • Metformin Unspecified     Increased lactic acid      • Tape Rash     Tears skin off  coban with Tegaderm tape ok intermittently  RXN=ongoing   • Vancomycin Itching     Pt becomes flushed in face and chest.   RXN=7/10/16   • Wound Dressing Adhesive Hives     By pt report   • Ciprofloxacin    • Depakote  [Divalproex Sodium]    • Hydromorphone Hcl      Pt states she feels loopy   • Kdc:Metronidazole+Tartrazine    • Keflex Rash     Pt states she gets a rash with this medication  Tolerates ceftriaxone   • Levofloxacin Unspecified     Leg muscle cramps   • Penicillins        DIET  Orders Placed This Encounter   Procedures   • Diet Order Diabetic     Standing Status:   Standing     Number of Occurrences:   1     Order Specific Question:   Diet:     Answer:   Diabetic [3]       CT-HIP W/O PLUS RECONS LEFT   Final Result      No acute fracture or dislocation.      CT-HEAD WITH & W/O   Final Result      1.  Unremarkable CT head with and without contrast enhancement.   2.  No acute intracranial abnormality.   3.  No mass or abnormal enhancement within the brain.      DX-CHEST-PORTABLE (1 VIEW)   Final Result      Diminishing lung volumes with no radiographic evidence of acute cardiopulmonary process.      MR-BRAIN-WITH & W/O    (Results Pending)   MR-ORBITS,FACE,NECK-WITH&W/O & SEQUENCES    " (Results Pending)     Results for orders placed or performed during the hospital encounter of 05/19/20   CBC with Differential   Result Value Ref Range    WBC 4.9 4.8 - 10.8 K/uL    RBC 4.12 (L) 4.20 - 5.40 M/uL    Hemoglobin 12.6 12.0 - 16.0 g/dL    Hematocrit 40.6 37.0 - 47.0 %    MCV 98.5 (H) 81.4 - 97.8 fL    MCH 30.6 27.0 - 33.0 pg    MCHC 31.0 (L) 33.6 - 35.0 g/dL    RDW 53.5 (H) 35.9 - 50.0 fL    Platelet Count 152 (L) 164 - 446 K/uL    MPV 11.4 9.0 - 12.9 fL    Neutrophils-Polys 62.50 44.00 - 72.00 %    Lymphocytes 26.10 22.00 - 41.00 %    Monocytes 10.00 0.00 - 13.40 %    Eosinophils 0.80 0.00 - 6.90 %    Basophils 0.20 0.00 - 1.80 %    Immature Granulocytes 0.40 0.00 - 0.90 %    Nucleated RBC 0.00 /100 WBC    Neutrophils (Absolute) 3.06 2.00 - 7.15 K/uL    Lymphs (Absolute) 1.28 1.00 - 4.80 K/uL    Monos (Absolute) 0.49 0.00 - 0.85 K/uL    Eos (Absolute) 0.04 0.00 - 0.51 K/uL    Baso (Absolute) 0.01 0.00 - 0.12 K/uL    Immature Granulocytes (abs) 0.02 0.00 - 0.11 K/uL    NRBC (Absolute) 0.00 K/uL   Comp Metabolic Panel   Result Value Ref Range    Sodium 139 135 - 145 mmol/L    Potassium 3.7 3.6 - 5.5 mmol/L    Chloride 111 96 - 112 mmol/L    Co2 16 (L) 20 - 33 mmol/L    Anion Gap 12.0 7.0 - 16.0    Glucose 88 65 - 99 mg/dL    Bun 17 8 - 22 mg/dL    Creatinine 0.83 0.50 - 1.40 mg/dL    Calcium 9.3 8.5 - 10.5 mg/dL    AST(SGOT) 7 (L) 12 - 45 U/L    ALT(SGPT) 28 2 - 50 U/L    Alkaline Phosphatase 47 30 - 99 U/L    Total Bilirubin 0.2 0.1 - 1.5 mg/dL    Albumin 4.1 3.2 - 4.9 g/dL    Total Protein 5.9 (L) 6.0 - 8.2 g/dL    Globulin 1.8 (L) 1.9 - 3.5 g/dL    A-G Ratio 2.3 g/dL   Magnesium   Result Value Ref Range    Magnesium 1.9 1.5 - 2.5 mg/dL   Phosphorus   Result Value Ref Range    Phosphorus 3.5 2.5 - 4.5 mg/dL   Ferritin   Result Value Ref Range    Ferritin 74.9 10.0 - 291.0 ng/mL   Troponin   Result Value Ref Range    Troponin T <6 6 - 19 ng/L   Creatinine Kinase   Result Value Ref Range    CPK Total 14 0  - 154 U/L   D-Dimer   Result Value Ref Range    D-Dimer Screen <0.27 0.00 - 0.50 ug/mL (FEU)   CRP Quantitative (Non-Cardiac)   Result Value Ref Range    Stat C-Reactive Protein 0.07 0.00 - 0.75 mg/dL   proBrain Natriuretic Peptide, NT   Result Value Ref Range    NT-proBNP 116 0 - 125 pg/mL   Sed Rate   Result Value Ref Range    Sed Rate Westergren 5 0 - 20 mm/hour   Procalcitonin   Result Value Ref Range    Procalcitonin <0.05 <0.25 ng/mL   LDH   Result Value Ref Range    LDH Total 233 107 - 266 U/L   Interleukin 6   Result Value Ref Range    Interleukin 6 <2.0 <=2.0 pg/mL   Lactic Acid   Result Value Ref Range    Lactic Acid 1.4 0.5 - 2.0 mmol/L   aPTT   Result Value Ref Range    APTT 24.1 (L) 24.7 - 36.0 sec   Prothrombin Time   Result Value Ref Range    PT 13.1 12.0 - 14.6 sec    INR 0.97 0.87 - 1.13   COVID/SARS CoV-2    Specimen: Nasopharyngeal; Respirate   Result Value Ref Range    COVID Order Status Received    Blood Culture    Specimen: Peripheral; Blood   Result Value Ref Range    Significant Indicator NEG     Source BLD     Site PERIPHERAL     Culture Result       No Growth  Note: Blood cultures are incubated for 5 days and  are monitored continuously.Positive blood cultures  are called to the RN and reported as soon as  they are identified.     Blood Culture    Specimen: Peripheral; Blood   Result Value Ref Range    Significant Indicator NEG     Source BLD     Site PERIPHERAL     Culture Result       No Growth  Note: Blood cultures are incubated for 5 days and  are monitored continuously.Positive blood cultures  are called to the RN and reported as soon as  they are identified.     URINALYSIS,CULTURE IF INDICATED    Specimen: Blood   Result Value Ref Range    Color Yellow     Character Clear     Specific Gravity 1.020 <1.035    Ph 6.5 5.0 - 8.0    Glucose Negative Negative mg/dL    Ketones Negative Negative mg/dL    Protein Negative Negative mg/dL    Bilirubin Negative Negative    Urobilinogen, Urine 1.0  Negative    Nitrite Negative Negative    Leukocyte Esterase Negative Negative    Occult Blood Negative Negative    Micro Urine Req see below    TSH   Result Value Ref Range    TSH 1.460 0.380 - 5.330 uIU/mL   FREE THYROXINE   Result Value Ref Range    Free T-4 0.88 (L) 0.93 - 1.70 ng/dL   SARS-CoV-2, PCR (In-House)   Result Value Ref Range    SARS-CoV-2 Source NP Swab     SARS-CoV-2 by PCR NotDetected NotDetected   ESTIMATED GFR   Result Value Ref Range    GFR If African American >60 >60 mL/min/1.73 m 2    GFR If Non African American >60 >60 mL/min/1.73 m 2   TSH WITH REFLEX TO FT4   Result Value Ref Range    TSH 1.230 0.380 - 5.330 uIU/mL   HCG QUALITATIVE UR   Result Value Ref Range    Beta-Hcg Urine Negative Negative   CBC with Differential   Result Value Ref Range    WBC 4.5 (L) 4.8 - 10.8 K/uL    RBC 4.03 (L) 4.20 - 5.40 M/uL    Hemoglobin 12.3 12.0 - 16.0 g/dL    Hematocrit 38.5 37.0 - 47.0 %    MCV 95.5 81.4 - 97.8 fL    MCH 30.5 27.0 - 33.0 pg    MCHC 31.9 (L) 33.6 - 35.0 g/dL    RDW 50.9 (H) 35.9 - 50.0 fL    Platelet Count 151 (L) 164 - 446 K/uL    MPV 11.7 9.0 - 12.9 fL    Neutrophils-Polys 71.00 44.00 - 72.00 %    Lymphocytes 20.40 (L) 22.00 - 41.00 %    Monocytes 7.10 0.00 - 13.40 %    Eosinophils 0.70 0.00 - 6.90 %    Basophils 0.40 0.00 - 1.80 %    Immature Granulocytes 0.40 0.00 - 0.90 %    Nucleated RBC 0.00 /100 WBC    Neutrophils (Absolute) 3.20 2.00 - 7.15 K/uL    Lymphs (Absolute) 0.92 (L) 1.00 - 4.80 K/uL    Monos (Absolute) 0.32 0.00 - 0.85 K/uL    Eos (Absolute) 0.03 0.00 - 0.51 K/uL    Baso (Absolute) 0.02 0.00 - 0.12 K/uL    Immature Granulocytes (abs) 0.02 0.00 - 0.11 K/uL    NRBC (Absolute) 0.00 K/uL   Comp Metabolic Panel   Result Value Ref Range    Sodium 138 135 - 145 mmol/L    Potassium 3.8 3.6 - 5.5 mmol/L    Chloride 113 (H) 96 - 112 mmol/L    Co2 16 (L) 20 - 33 mmol/L    Anion Gap 9.0 7.0 - 16.0    Glucose 99 65 - 99 mg/dL    Bun 14 8 - 22 mg/dL    Creatinine 0.64 0.50 - 1.40 mg/dL     Calcium 8.8 8.5 - 10.5 mg/dL    AST(SGOT) 13 12 - 45 U/L    ALT(SGPT) 29 2 - 50 U/L    Alkaline Phosphatase 44 30 - 99 U/L    Total Bilirubin 0.4 0.1 - 1.5 mg/dL    Albumin 3.9 3.2 - 4.9 g/dL    Total Protein 5.5 (L) 6.0 - 8.2 g/dL    Globulin 1.6 (L) 1.9 - 3.5 g/dL    A-G Ratio 2.4 g/dL   ESTIMATED GFR   Result Value Ref Range    GFR If African American >60 >60 mL/min/1.73 m 2    GFR If Non African American >60 >60 mL/min/1.73 m 2   CBC WITH DIFFERENTIAL   Result Value Ref Range    WBC 5.1 4.8 - 10.8 K/uL    RBC 4.04 (L) 4.20 - 5.40 M/uL    Hemoglobin 12.3 12.0 - 16.0 g/dL    Hematocrit 38.3 37.0 - 47.0 %    MCV 94.8 81.4 - 97.8 fL    MCH 30.4 27.0 - 33.0 pg    MCHC 32.1 (L) 33.6 - 35.0 g/dL    RDW 50.1 (H) 35.9 - 50.0 fL    Platelet Count 150 (L) 164 - 446 K/uL    MPV 11.3 9.0 - 12.9 fL    Neutrophils-Polys 47.80 44.00 - 72.00 %    Lymphocytes 40.40 22.00 - 41.00 %    Monocytes 9.40 0.00 - 13.40 %    Eosinophils 1.20 0.00 - 6.90 %    Basophils 0.40 0.00 - 1.80 %    Immature Granulocytes 0.80 0.00 - 0.90 %    Nucleated RBC 0.00 /100 WBC    Neutrophils (Absolute) 2.44 2.00 - 7.15 K/uL    Lymphs (Absolute) 2.06 1.00 - 4.80 K/uL    Monos (Absolute) 0.48 0.00 - 0.85 K/uL    Eos (Absolute) 0.06 0.00 - 0.51 K/uL    Baso (Absolute) 0.02 0.00 - 0.12 K/uL    Immature Granulocytes (abs) 0.04 0.00 - 0.11 K/uL    NRBC (Absolute) 0.00 K/uL   Comp Metabolic Panel   Result Value Ref Range    Sodium 139 135 - 145 mmol/L    Potassium 3.6 3.6 - 5.5 mmol/L    Chloride 109 96 - 112 mmol/L    Co2 16 (L) 20 - 33 mmol/L    Anion Gap 14.0 7.0 - 16.0    Glucose 91 65 - 99 mg/dL    Bun 21 8 - 22 mg/dL    Creatinine 0.71 0.50 - 1.40 mg/dL    Calcium 9.3 8.5 - 10.5 mg/dL    AST(SGOT) 9 (L) 12 - 45 U/L    ALT(SGPT) 23 2 - 50 U/L    Alkaline Phosphatase 42 30 - 99 U/L    Total Bilirubin 0.2 0.1 - 1.5 mg/dL    Albumin 4.0 3.2 - 4.9 g/dL    Total Protein 5.7 (L) 6.0 - 8.2 g/dL    Globulin 1.7 (L) 1.9 - 3.5 g/dL    A-G Ratio 2.4 g/dL    ESTIMATED GFR   Result Value Ref Range    GFR If African American >60 >60 mL/min/1.73 m 2    GFR If Non African American >60 >60 mL/min/1.73 m 2   ACCU-CHEK GLUCOSE   Result Value Ref Range    Glucose - Accu-Ck 84 65 - 99 mg/dL   ACCU-CHEK GLUCOSE   Result Value Ref Range    Glucose - Accu-Ck 79 65 - 99 mg/dL   ACCU-CHEK GLUCOSE   Result Value Ref Range    Glucose - Accu-Ck 119 (H) 65 - 99 mg/dL   ACCU-CHEK GLUCOSE   Result Value Ref Range    Glucose - Accu-Ck 108 (H) 65 - 99 mg/dL   ACCU-CHEK GLUCOSE   Result Value Ref Range    Glucose - Accu-Ck 88 65 - 99 mg/dL   ACCU-CHEK GLUCOSE   Result Value Ref Range    Glucose - Accu-Ck 113 (H) 65 - 99 mg/dL   ACCU-CHEK GLUCOSE   Result Value Ref Range    Glucose - Accu-Ck 90 65 - 99 mg/dL   ACCU-CHEK GLUCOSE   Result Value Ref Range    Glucose - Accu-Ck 82 65 - 99 mg/dL   ACCU-CHEK GLUCOSE   Result Value Ref Range    Glucose - Accu-Ck 93 65 - 99 mg/dL   ACCU-CHEK GLUCOSE   Result Value Ref Range    Glucose - Accu-Ck 114 (H) 65 - 99 mg/dL   EKG   Result Value Ref Range    Report       [facility]    Test Date:  2020  Pt Name:    HARLEY DE LA CRUZ              Department:   MRN:        [id number]                      Room:       [facility]  Gender:     Female                       Technician: [place]  :        1990                   Requested By:LUIS EDUARDO ROY  Order #:    [id number]                    Reading MD:    Measurements  Intervals                                Axis  Rate:       70                           P:          19  NY:         152                          QRS:        21  QRSD:       96                           T:          7  QT:         404  QTc:        436    Interpretive Statements  SINUS RHYTHM  BORDERLINE T ABNORMALITIES, ANTERIOR LEADS  Compared to ECG 2020 16:56:26  No significant changes     EKG   Result Value Ref Range    Report       [facility]    Test Date:  2020  Pt Name:    HARLEY DE LA CRUZ              Department:  ER  MRN:        [id number]                      Room:       [facility]  Gender:     Female                       Technician: VERONA  :        1990                   Requested By:SONNY AGUILAR  Order #:    [id number]                    Reading MD: Natan Sosa MD    Measurements  Intervals                                Axis  Rate:       70                           P:          19  OK:         152                          QRS:        21  QRSD:       96                           T:          7  QT:         404  QTc:        436    Interpretive Statements  SINUS RHYTHM  BORDERLINE T ABNORMALITIES, ANTERIOR LEADS  Compared to ECG 2020 16:56:26  No significant changes  Electronically Signed On 2020 11:10:52 PDT by Natan Sosa MD       *Note: Due to a large number of results and/or encounters for the requested time period, some results have not been displayed. A complete set of results can be found in Results Review.     ACTIVITY  As tolerated.  Weight bearing as tolerated    CONSULTATIONS  Dr. Russell with Neurology Service consulted.  Treatment options were discussed and plan of care agreed upon.  Dr. Cabral neuro ophthalmology was consulted and treatment options were discussed and plan of care agreed upon.    PROCEDURES  Visual evoked potential testing.    A computerized dictation system may have been used for this note.    Despite review, there may be some spelling or grammatical errors.  Dion Garnica M.D.

## 2020-05-21 NOTE — DISCHARGE INSTRUCTIONS
Discharge Instructions    Discharged to home by taxi with self. Discharged via wheelchair, hospital escort: Refused.  Special equipment needed: Wheelchair    Be sure to schedule a follow-up appointment with your primary care doctor or any specialists as instructed.     Discharge Plan:   Diet Plan: Discussed  Activity Level: Discussed  Confirmed Follow up Appointment: Appointment Scheduled  Confirmed Symptoms Management: Discussed  Medication Reconciliation Updated: Yes    I understand that a diet low in cholesterol, fat, and sodium is recommended for good health. Unless I have been given specific instructions below for another diet, I accept this instruction as my diet prescription.   Other diet: N/A    Special Instructions: None    · Is patient discharged on Warfarin / Coumadin?   No     Depression / Suicide Risk    As you are discharged from this [facility] facility, it is important to learn how to keep safe from harming yourself.    Recognize the warning signs:  · Abrupt changes in personality, positive or negative- including increase in energy   · Giving away possessions  · Change in eating patterns- significant weight changes-  positive or negative  · Change in sleeping patterns- unable to sleep or sleeping all the time   · Unwillingness or inability to communicate  · Depression  · Unusual sadness, discouragement and loneliness  · Talk of wanting to die  · Neglect of personal appearance   · Rebelliousness- reckless behavior  · Withdrawal from people/activities they love  · Confusion- inability to concentrate     If you or a loved one observes any of these behaviors or has concerns about self-harm, here's what you can do:  · Talk about it- your feelings and reasons for harming yourself  · Remove any means that you might use to hurt yourself (examples: pills, rope, extension cords, firearm)  · Get professional help from the community (Mental Health, Substance Abuse, psychological counseling)  · Do not be  alone:Call your Safe Contact- someone whom you trust who will be there for you.  · Call your local CRISIS HOTLINE 579-2862 or 197-153-7515  · Call your local Children's Mobile Crisis Response Team [place] (862) 121-3199 or www.[employer]  · Call the toll free National Suicide Prevention Hotlines   · National Suicide Prevention Lifeline 223-976-ANNZ (9823)  · National [employer] Line Network 800-SUICIDE (286-2289)

## 2020-05-21 NOTE — PROGRESS NOTES
Patient prepared for discharge. Vitals taken. IV's removed. All personal belongings gathered and sent with patient. Patient given discharge paperwork including education on follow-up appointments and new medications. No questions or concerns. The patient left by wheelchair with a taxi to return home.

## 2020-05-21 NOTE — CARE PLAN
Problem: Safety  Goal: Will remain free from falls  Outcome: PROGRESSING AS EXPECTED   Pt free from falls. Bed in lowest position and locked. Bed alarm on. Call light within reach. Hourly rounding done.   Problem: Pain Management  Goal: Pain level will decrease to patient's comfort goal  Outcome: PROGRESSING AS EXPECTED   Pt with complaints of mild generalized pain. Goal of comfort at rest. Taking PRN Tylenol appropriately.

## 2020-05-22 NOTE — CONSULTS
DATE OF SERVICE:  05/21/2020    HISTORY OF PRESENT ILLNESS:  The patient was evaluated at [facility] on an emergency basis on 05/21/2020.  The patient is 29 years of age   and has a history of clinically stable, chronic, relapsing inflammatory optic   neuropathy (CRION syndrome).  In addition, the patient also has idiopathic   intracranial hypertension, unassociated with papilledema.  The patient reports   that her headaches have improved following treatment with a higher dose of   Diamox 2500 mg p.o. daily.  The patient reports that her central vision, color   vision, and peripheral vision have remained stable, OS.  She admits to mild   retroorbital pain, OS, which is graded at a 5 on a scale of 0-10.    The patient was screened for COVID-19 in accordance with the recommendations   on the CDC.  The patient was wearing a facemask.  Dr. Yousif was wearing an   N95 respirator.  The patient's temperature was 97.1.    The patient was admitted to [facility] with the sudden onset of   quadriparesis.  The patient reports that she has difficulty moving arms and   legs.    MEDICATIONS:  Include Diamox 2500 mg p.o. daily, prednisone 20 mg p.o. daily,   CellCept 1000 mg p.o. b.i.d., trazodone 50 mg p.o. each p.m., Lyrica 200 mg   p.o. b.i.d.    PAST MEDICAL HISTORY:  Type 2 diabetes mellitus, Hashimoto's thyroiditis, and   CRION disease.    PAST SURGICAL HISTORY:  Status post placement of an InterStim pacemaker   (implanted unilateral leads stimulating the S3 nerve root).    PAST FAMILY HISTORY:  Mother with a history of hypertension.    SOCIAL HISTORY:  The patient neither smokes nor drinks.    REVIEW OF SYSTEMS:  The patient has a history of a cardiac arrhythmia, chest   pain, tinnitus, arthritis, easy fatigability, shortness of breath, headache,   thyroid disease, and diabetes mellitus.    MENTAL STATUS EXAMINATION:  The patient's speech was fluent.  Insight,   comprehension, and judgment were  normal.  Cranial nerves, corneal sensation   and facial sensation were intact.  There was symmetric eyelid closure and   facial grimace.  Lower cranial nerves were normal.    The patient was admitted with quadriparesis.  It is noteworthy that during the   examination, the patient was able to elevate both the arms and legs without   resistance.    VISUAL ACUITY:  Best corrected visual acuity, OD 20/20, OS 20/20.    Near visual acuity, OD, J1 and J2.    HRR plates 6 and 5.5 Amsler, there is doubling of grid markings, OD, and a   waviness of grid markings, OS.  Confrontation visual fields full normal.    Pupils 5.0 and 5.5.  There was no evidence of a relative afferent pupillary   defect.    FLICKER FUSION:  OD 31 Hz, OS 32 Hz.    SLIT LAMP EXAMINATION:  There was no cells and flare.    TENSIONS.  OD 20, OS 19.    MOTILITY EXAMINATION:  Extended ocular motility examination was performed.    Primary gaze, 2 prism diopters of exotropia.  Right gaze 2, left gaze 2,   upgaze 2, downgaze 2, near 2.    DILATED OPHTHALMOSCOPY:  Cup-to-disk ratio measured 0.5.  The right optic   nerve, macula and peripheral retina appeared normal.  There was a superior   peripapillary nevus, OS.  Cup-to-disk ratio measured 0.4.  The left optic   nerve, macula and peripheral retina appeared normal.    OCT:  The mean retinal nerve fiber layer thickness was normal, OD at 90   microns; and OS normal at 94 microns.  Importantly, there was no papilledema.    The line scan through the macula were normal, OU.    Laboratory studies were obtained on 05/21/2020, the CBC and comprehensive   metabolic panels were essentially normal.  There was reduced total protein at   5.7.    VISUAL EVOKED POTENTIAL:  A visual evoked potential was performed stimulating   each eye monocularly using both large and small targets.  The latency of the   P100 OD using small targets was normal at 97.3 msec.  The latency of the P100   OS was normal at 95.7 msec.    The latency  of the P100 OD using large targets was normal at 95.7 msec and   normal OS at 99.2 msec.  The amplitude of the P100 waveform was symmetric   bilaterally.  This was a normal visual evoked potential.    CT scan, brain:  The patient had a CT scan of the brain performed during   hospitalization, which was reportedly normal.    ASSESSMENT:  1.  Clinically stable recurrent chronic relapsing inflammatory optic   neuropathy (CRION syndrome).  2.  Recurrent acute retrobulbar optic neuritis, OS.  3.  Remote retrobulbar optic neuritis, OD.  4.  Recurrent optic neuritis, OD 09/08/2018.  5.  Clinically stable idiopathic intracranial hypertension, unassociated with   papilledema.  6.  Glaucoma, suspect.  7.  Small-angle esotropia.  8.  Remote history of paroxysmal atrial fibrillation.    RECOMMENDATIONS:  1.  Maintain the Diamox at 1000 mg p.o. in the a.m. and 1500 mg p.o. in the   p.m.  2.  Maintain the prednisone 20 mg p.o. daily.  3.  Weight reducing diet.  4.  Follow up with neurophthalmology clinic on 06/29/2020.    Time spent was 40 minutes, history and physical examination, review of medical   records, diagnosis, differential diagnosis, treatment strategies.       ____________________________________     MARITZA GROVER, DO    SHAHRZAD / CHRISTELLE    DD:  05/22/2020 13:18:39  DT:  05/22/2020 14:37:55    D#:  [id number]  Job#:  [id number]

## 2020-05-26 ENCOUNTER — HOSPITAL ENCOUNTER (OUTPATIENT)
Dept: LAB | Facility: MEDICAL CENTER | Age: 30
End: 2020-05-26
Attending: INTERNAL MEDICINE
Payer: MEDICARE

## 2020-05-26 ENCOUNTER — HOSPITAL ENCOUNTER (OUTPATIENT)
Facility: MEDICAL CENTER | Age: 30
End: 2020-05-26
Attending: INTERNAL MEDICINE
Payer: MEDICARE

## 2020-05-26 LAB
ALBUMIN SERPL BCP-MCNC: 4.5 G/DL (ref 3.2–4.9)
BUN SERPL-MCNC: 13 MG/DL (ref 8–22)
CALCIUM SERPL-MCNC: 9.1 MG/DL (ref 8.5–10.5)
CHLORIDE SERPL-SCNC: 113 MMOL/L (ref 96–112)
CO2 SERPL-SCNC: 14 MMOL/L (ref 20–33)
CREAT SERPL-MCNC: 0.85 MG/DL (ref 0.5–1.4)
GLUCOSE SERPL-MCNC: 123 MG/DL (ref 65–99)
MAGNESIUM SERPL-MCNC: 2.3 MG/DL (ref 1.5–2.5)
PHOSPHATE SERPL-MCNC: 3.3 MG/DL (ref 2.5–4.5)
POTASSIUM SERPL-SCNC: 4.2 MMOL/L (ref 3.6–5.5)
PTH-INTACT SERPL-MCNC: 39.7 PG/ML (ref 14–72)
SODIUM SERPL-SCNC: 139 MMOL/L (ref 135–145)
URATE SERPL-MCNC: 4.4 MG/DL (ref 1.9–8.2)

## 2020-05-26 PROCEDURE — 83970 ASSAY OF PARATHORMONE: CPT

## 2020-05-26 PROCEDURE — 36415 COLL VENOUS BLD VENIPUNCTURE: CPT

## 2020-05-26 PROCEDURE — 83735 ASSAY OF MAGNESIUM: CPT

## 2020-05-26 PROCEDURE — 82306 VITAMIN D 25 HYDROXY: CPT

## 2020-05-26 PROCEDURE — 84550 ASSAY OF BLOOD/URIC ACID: CPT

## 2020-05-26 PROCEDURE — 81003 URINALYSIS AUTO W/O SCOPE: CPT

## 2020-05-26 PROCEDURE — 80069 RENAL FUNCTION PANEL: CPT

## 2020-05-27 ENCOUNTER — TELEMEDICINE (OUTPATIENT)
Dept: MEDICAL GROUP | Facility: MEDICAL CENTER | Age: 30
End: 2020-05-27
Payer: MEDICARE

## 2020-05-27 VITALS — RESPIRATION RATE: 14 BRPM

## 2020-05-27 DIAGNOSIS — E11.9 TYPE 2 DIABETES MELLITUS WITHOUT COMPLICATION, WITH LONG-TERM CURRENT USE OF INSULIN (HCC): ICD-10-CM

## 2020-05-27 DIAGNOSIS — G63 POLYNEUROPATHY ASSOCIATED WITH UNDERLYING DISEASE (HCC): Chronic | ICD-10-CM

## 2020-05-27 DIAGNOSIS — Z79.4 TYPE 2 DIABETES MELLITUS WITHOUT COMPLICATION, WITH LONG-TERM CURRENT USE OF INSULIN (HCC): ICD-10-CM

## 2020-05-27 LAB
25(OH)D3 SERPL-MCNC: 13 NG/ML (ref 30–100)
APPEARANCE UR: CLEAR
BILIRUB UR QL STRIP.AUTO: NEGATIVE
COLOR UR: NORMAL
CREAT UR-MCNC: 107.73 MG/DL
GLUCOSE UR STRIP.AUTO-MCNC: NEGATIVE MG/DL
KETONES UR STRIP.AUTO-MCNC: NEGATIVE MG/DL
LEUKOCYTE ESTERASE UR QL STRIP.AUTO: NEGATIVE
MICRO URNS: NORMAL
NITRITE UR QL STRIP.AUTO: NEGATIVE
PH UR STRIP.AUTO: 6.5 [PH] (ref 5–8)
PROT UR QL STRIP: NEGATIVE MG/DL
PROT UR-MCNC: 7 MG/DL (ref 0–15)
PROT/CREAT UR: 65 MG/G (ref 10–107)
RBC UR QL AUTO: NEGATIVE
SP GR UR STRIP.AUTO: 1.02
UROBILINOGEN UR STRIP.AUTO-MCNC: 1 MG/DL

## 2020-05-27 PROCEDURE — 99213 OFFICE O/P EST LOW 20 MIN: CPT | Mod: 95,CR | Performed by: INTERNAL MEDICINE

## 2020-05-27 NOTE — PROGRESS NOTES
This encounter was conducted via Zoom meeting  Verbal consent was obtained. Patient's identity was verified.       CC: Hospital follow-up muscle weakness, diabetes.                                                                                                                                      HPI:   Kristin presents today with the following.    1. Polyneuropathy associated with underlying disease (HCC)  Presents after going to the hospital bilateral muscle weakness.  Placed on a's slight increased dose of steroid quickly taper down to baseline.  She reports pain is still present but improved in nature since discharge.    2. Type 2 diabetes mellitus without complication, with long-term current use of insulin ([facility])  Reports blood sugars doing well fasting of 118 no hypoglycemia.        Patient Active Problem List    Diagnosis Date Noted   • Intracranial hypertension 02/27/2020     Priority: High   • Optic neuritis 05/27/2019     Priority: High   • Polypharmacy 06/27/2016     Priority: High   • Bilateral heel pain secondary to calcaneal bone spurs 03/28/2016     Priority: High   • Diabetes mellitus type 2 in obese (HCC) 04/13/2017     Priority: Medium   • Morbidly obese (HCC) 03/07/2016     Priority: Medium   • H/O prior ablation treatment 02/10/2016     Priority: Medium   • Immunocompromised ([facility]) 11/06/2019     Priority: Low   • History of atrial fibrillation and cardiomyopathy? 11/06/2019     Priority: Low   • Generalized weakness 09/30/2019     Priority: Low   • Hypothyroidism 08/04/2017     Priority: Low   • Depression 10/28/2016     Priority: Low   • Schizophrenia ([facility]) 10/27/2016     Priority: Low   • GERD (gastroesophageal reflux disease) 03/07/2016     Priority: Low   • Peripheral neuropathy and chornic pain syndrome (CMS-HCC) 03/06/2016     Priority: Low   • Fatty liver disease, nonalcoholic 01/19/2015     Priority: Low   • Anxiety 12/16/2014     Priority: Low   • Knee pain, right 02/13/2014     Priority:  Low   • Borderline personality disorder in adult ([facility]) 09/18/2010     Priority: Low   • Type 2 diabetes mellitus without complication, with long-term current use of insulin ([facility]) 05/11/2020   • History of supraventricular tachycardia 04/20/2020   • Schizoaffective disorder, depressive type ([facility]) 03/02/2020   • PTSD (post-traumatic stress disorder) 03/02/2020   • Stress incontinence 12/27/2019   • Mixed stress and urge urinary incontinence 12/27/2019   • Increased frequency of urination 12/27/2019   • History of optic neuritis 12/17/2019   • Mild intermittent asthma without complication 12/16/2019   • Closed head injury 11/30/2019   • Hypocalcemia 11/30/2019   • SVT (supraventricular tachycardia) ([facility]) 04/10/2019   • Functional diarrhea 01/05/2018   • Bowel and bladder incontinence 10/27/2016   • Galactorrhea 07/22/2016   • Scoliosis 03/07/2016       Current Outpatient Medications   Medication Sig Dispense Refill   • acetaminophen (TYLENOL) 500 MG Tab Take 1 Tab by mouth 4 times a day as needed for Mild Pain, Moderate Pain or Fever (Mild Pain; (Pain scale 1-3); Temp greater than 100.5 F). 60 Tab 0   • predniSONE (DELTASONE) 20 MG Tab Take 1 Tab by mouth every day. 40 Tab 0   • acetaZOLAMIDE (DIAMOX) 250 MG Tab Take 1,000-1,500 mg by mouth 2 times a day. 1500 mg in the AM  1000 mg in the PM     • azithromycin (ZITHROMAX) 250 MG Tab Take 250-500 mg by mouth every day. Z-Ilya     • doxycycline (VIBRAMYCIN) 100 MG Tab Take 100 mg by mouth 2 times a day. 10 day course     • diphenhydrAMINE (BENADRYL) 12.5 MG/5ML Liquid liquid Take 25 mg by mouth 2 times daily with meals as needed. With the Doxycycline     • diclofenac EC (VOLTAREN) 50 MG Tablet Delayed Response Take 50 mg by mouth 3 times a day.     • insulin glargine (LANTUS SOLOSTAR) 100 UNIT/ML Solution Pen-injector injection Inject 15 Units as instructed every evening. 5 PEN 3   • ziprasidone (GEODON) 40 MG Cap TAKE ONE CAPSULE BY MOUTH TWICE DAILY 60 Cap 0   •  fluoxetine (PROZAC) 40 MG capsule TAKE ONE CAPSULE BY MOUTH ONCE A DAY 30 Cap 0   • lactulose 10 GM/15ML Solution Take 15 mL by mouth 2 times a day. 1 Bottle 2   • senna-docusate (PERICOLACE OR SENOKOT S) 8.6-50 MG Tab Take 2 Tabs by mouth 2 times a day.     • busPIRone (BUSPAR) 10 MG Tab tablet TAKE ONE TABLET BY MOUTH TWICE DAILY 60 Tab 0   • Ivabradine HCl (CORLANOR) 7.5 MG Tab Take 7.5 mg by mouth 2 Times a Day. Needs to schedule follow up with Dr. Wilde 180 Tab 1   • levothyroxine (SYNTHROID) 100 MCG Tab Take 1 Tab by mouth Every morning on an empty stomach. 30 Tab 2   • vitamin D (VITAMIND D3) 1000 UNIT Tab Take 1 Tab by mouth every day. 60 Tab 0   • pregabalin (LYRICA) 300 MG capsule Take 300 mg by mouth 2 times a day.     • OXcarbazepine (TRILEPTAL) 150 MG Tab Take 1 Tab by mouth 2 Times a Day. 60 Tab 2   • traZODone (DESYREL) 100 MG Tab Take 0.5 Tabs by mouth every evening. 30 Tab 3   • budesonide-formoterol (SYMBICORT) 160-4.5 MCG/ACT Aerosol Inhale 2 Puffs by mouth 2 Times a Day.     • aspirin EC (ECOTRIN) 81 MG Tablet Delayed Response Take 81 mg by mouth every day.     • ondansetron (ZOFRAN ODT) 4 MG TABLET DISPERSIBLE Take 4 mg by mouth every 6 hours as needed for Nausea.     • potassium chloride SA (KDUR) 20 MEQ Tab CR Take 20 mEq by mouth 2 times a day.     • tiotropium (SPIRIVA) 18 MCG Cap Inhale 1 Cap by mouth every day. 90 Cap 3   • ipratropium-albuterol (DUONEB) 0.5-2.5 (3) MG/3ML nebulizer solution 3 mL by Nebulization route every 6 hours as needed for Shortness of Breath. 60 Bullet 1   • etonogestrel (NEXPLANON) 68 MG Implant implant Inject 1 Each as instructed Once.     • mycophenolate (CELLCEPT) 500 MG tablet Take 1,000 mg by mouth 2 times a day.     • Dulaglutide (TRULICITY) 1.5 MG/0.5ML Solution Pen-injector Inject 1.5 mg as instructed every Friday.     • albuterol 108 (90 Base) MCG/ACT Aero Soln inhalation aerosol Inhale 2 Puffs by mouth every 6 hours as needed for Shortness of Breath.      • Melatonin 10 MG Tab Take 10 mg by mouth every evening.     • furosemide (LASIX) 80 MG Tab Take 80 mg by mouth 1 time daily as needed.       No current facility-administered medications for this visit.          Allergies as of 05/27/2020 - Reviewed 05/27/2020   Allergen Reaction Noted   • Depakote [divalproex sodium] Unspecified 06/14/2010   • Doxycycline Anaphylaxis and Vomiting 08/15/2012   • Amitriptyline Unspecified 10/31/2013   • Aripiprazole [abilify] Unspecified 01/17/2013   • Clindamycin Nausea 02/02/2011   • Flagyl [metronidazole hcl] Unspecified 03/31/2011   • Flomax [tamsulosin hydrochloride] Swelling 09/24/2009   • Levaquin Unspecified 10/31/2013   • Metformin Unspecified 07/23/2013   • Tape Rash 08/15/2012   • Vancomycin Itching 07/10/2016   • Wound dressing adhesive Hives 01/12/2018   • Ciprofloxacin  01/16/2020   • Depakote  [divalproex sodium]  01/16/2020   • Hydromorphone hcl  01/16/2020   • Kdc:metronidazole+tartrazine  01/16/2020   • Keflex Rash 01/01/2017   • Levofloxacin Unspecified 10/27/2016   • Penicillins  01/16/2020        ROS:  Denies, chest pain, Shortness of breath, Edema.     Resp 14       Physical Exam:  Constitutional: Alert, no distress, well-groomed.  Skin: No rashes in visible areas.  Eye: Round. Conjunctiva clear, lNo icterus.   ENMT: Lips pink without lesions, good dentition, moist mucous membranes. Phonation normal.  Neck: No masses, no thyromegaly. Moves freely without pain.  CV: Pulse as reported by patient  Respiratory: Unlabored respiratory effort, no cough or audible wheeze  Psych: Alert and oriented x3, normal affect and mood.          Assessment and Plan.   29 y.o. female with the following issues.    1. Polyneuropathy associated with underlying disease (HCC)  Stable no change to therapy    2. Type 2 diabetes mellitus without complication, with long-term current use of insulin ([facility])  Reports good readings will get A1c when possible.

## 2020-06-01 RX ORDER — BUSPIRONE HYDROCHLORIDE 10 MG/1
TABLET ORAL
Qty: 60 TAB | Refills: 0 | Status: SHIPPED | OUTPATIENT
Start: 2020-06-01 | End: 2020-06-15

## 2020-06-09 ENCOUNTER — HOME HEALTH ADMISSION (OUTPATIENT)
Dept: HOME HEALTH SERVICES | Facility: HOME HEALTHCARE | Age: 30
End: 2020-06-09
Payer: MEDICARE

## 2020-06-10 ENCOUNTER — TELEMEDICINE (OUTPATIENT)
Dept: MEDICAL GROUP | Facility: MEDICAL CENTER | Age: 30
End: 2020-06-10
Payer: MEDICARE

## 2020-06-10 DIAGNOSIS — N39.0 URINARY TRACT INFECTION WITHOUT HEMATURIA, SITE UNSPECIFIED: ICD-10-CM

## 2020-06-10 DIAGNOSIS — E66.9 DIABETES MELLITUS TYPE 2 IN OBESE: ICD-10-CM

## 2020-06-10 DIAGNOSIS — E11.69 DIABETES MELLITUS TYPE 2 IN OBESE: ICD-10-CM

## 2020-06-10 PROCEDURE — 99213 OFFICE O/P EST LOW 20 MIN: CPT | Mod: 95,CR | Performed by: INTERNAL MEDICINE

## 2020-06-10 NOTE — PROGRESS NOTES
This encounter was conducted via Zoom meeting  Verbal consent was obtained. Patient's identity was verified.             CC: Follow-up diabetes, UTI.                                                                                                                                      HPI:   Kristin presents today with the following.    1. Diabetes mellitus type 2 in obese ([facility])  Reports blood sugars are currently doing well on regimen she is coming due for A1c.    2. Urinary tract infection without hematuria, site unspecified  Admitted to an outside hospital found to have a urinary tract infection treated complete with antibiotics before discharge.  She denies any pain with urination no blood in her urine or other symptoms.  These seem to be completely resolved no fevers or other symptoms.      Patient Active Problem List    Diagnosis Date Noted   • Intracranial hypertension 02/27/2020     Priority: High   • Optic neuritis 05/27/2019     Priority: High   • Polypharmacy 06/27/2016     Priority: High   • Bilateral heel pain secondary to calcaneal bone spurs 03/28/2016     Priority: High   • Diabetes mellitus type 2 in obese ([facility]) 04/13/2017     Priority: Medium   • Morbidly obese ([facility]) 03/07/2016     Priority: Medium   • H/O prior ablation treatment 02/10/2016     Priority: Medium   • Immunocompromised ([facility]) 11/06/2019     Priority: Low   • History of atrial fibrillation and cardiomyopathy? 11/06/2019     Priority: Low   • Generalized weakness 09/30/2019     Priority: Low   • Hypothyroidism 08/04/2017     Priority: Low   • Depression 10/28/2016     Priority: Low   • Schizophrenia ([facility]) 10/27/2016     Priority: Low   • GERD (gastroesophageal reflux disease) 03/07/2016     Priority: Low   • Peripheral neuropathy and chornic pain syndrome (CMS-[facility]) 03/06/2016     Priority: Low   • Fatty liver disease, nonalcoholic 01/19/2015     Priority: Low   • Anxiety 12/16/2014     Priority: Low   • Knee pain, right 02/13/2014      Priority: Low   • Borderline personality disorder in adult ([facility]) 09/18/2010     Priority: Low   • Type 2 diabetes mellitus without complication, with long-term current use of insulin ([facility]) 05/11/2020   • History of supraventricular tachycardia 04/20/2020   • Schizoaffective disorder, depressive type ([facility]) 03/02/2020   • PTSD (post-traumatic stress disorder) 03/02/2020   • Stress incontinence 12/27/2019   • Mixed stress and urge urinary incontinence 12/27/2019   • Increased frequency of urination 12/27/2019   • History of optic neuritis 12/17/2019   • Mild intermittent asthma without complication 12/16/2019   • Closed head injury 11/30/2019   • Hypocalcemia 11/30/2019   • SVT (supraventricular tachycardia) ([facility]) 04/10/2019   • Functional diarrhea 01/05/2018   • Bowel and bladder incontinence 10/27/2016   • Galactorrhea 07/22/2016   • Scoliosis 03/07/2016       Current Outpatient Medications   Medication Sig Dispense Refill   • busPIRone (BUSPAR) 10 MG Tab tablet TAKE ONE TABLET BY MOUTH TWICE DAILY 60 Tab 0   • acetaminophen (TYLENOL) 500 MG Tab Take 1 Tab by mouth 4 times a day as needed for Mild Pain, Moderate Pain or Fever (Mild Pain; (Pain scale 1-3); Temp greater than 100.5 F). 60 Tab 0   • predniSONE (DELTASONE) 20 MG Tab Take 1 Tab by mouth every day. 40 Tab 0   • acetaZOLAMIDE (DIAMOX) 250 MG Tab Take 1,000-1,500 mg by mouth 2 times a day. 1500 mg in the AM  1000 mg in the PM     • azithromycin (ZITHROMAX) 250 MG Tab Take 250-500 mg by mouth every day. Z-Ilya     • doxycycline (VIBRAMYCIN) 100 MG Tab Take 100 mg by mouth 2 times a day. 10 day course     • diphenhydrAMINE (BENADRYL) 12.5 MG/5ML Liquid liquid Take 25 mg by mouth 2 times daily with meals as needed. With the Doxycycline     • diclofenac EC (VOLTAREN) 50 MG Tablet Delayed Response Take 50 mg by mouth 3 times a day.     • insulin glargine (LANTUS SOLOSTAR) 100 UNIT/ML Solution Pen-injector injection Inject 15 Units as instructed every evening. 5 PEN  3   • ziprasidone (GEODON) 40 MG Cap TAKE ONE CAPSULE BY MOUTH TWICE DAILY 60 Cap 0   • fluoxetine (PROZAC) 40 MG capsule TAKE ONE CAPSULE BY MOUTH ONCE A DAY 30 Cap 0   • lactulose 10 GM/15ML Solution Take 15 mL by mouth 2 times a day. 1 Bottle 2   • senna-docusate (PERICOLACE OR SENOKOT S) 8.6-50 MG Tab Take 2 Tabs by mouth 2 times a day.     • Ivabradine HCl (CORLANOR) 7.5 MG Tab Take 7.5 mg by mouth 2 Times a Day. Needs to schedule follow up with Dr. Wilde 180 Tab 1   • levothyroxine (SYNTHROID) 100 MCG Tab Take 1 Tab by mouth Every morning on an empty stomach. 30 Tab 2   • vitamin D (VITAMIND D3) 1000 UNIT Tab Take 1 Tab by mouth every day. 60 Tab 0   • pregabalin (LYRICA) 300 MG capsule Take 300 mg by mouth 2 times a day.     • OXcarbazepine (TRILEPTAL) 150 MG Tab Take 1 Tab by mouth 2 Times a Day. 60 Tab 2   • traZODone (DESYREL) 100 MG Tab Take 0.5 Tabs by mouth every evening. 30 Tab 3   • budesonide-formoterol (SYMBICORT) 160-4.5 MCG/ACT Aerosol Inhale 2 Puffs by mouth 2 Times a Day.     • aspirin EC (ECOTRIN) 81 MG Tablet Delayed Response Take 81 mg by mouth every day.     • ondansetron (ZOFRAN ODT) 4 MG TABLET DISPERSIBLE Take 4 mg by mouth every 6 hours as needed for Nausea.     • potassium chloride SA (KDUR) 20 MEQ Tab CR Take 20 mEq by mouth 2 times a day.     • tiotropium (SPIRIVA) 18 MCG Cap Inhale 1 Cap by mouth every day. 90 Cap 3   • ipratropium-albuterol (DUONEB) 0.5-2.5 (3) MG/3ML nebulizer solution 3 mL by Nebulization route every 6 hours as needed for Shortness of Breath. 60 Bullet 1   • etonogestrel (NEXPLANON) 68 MG Implant implant Inject 1 Each as instructed Once.     • mycophenolate (CELLCEPT) 500 MG tablet Take 1,000 mg by mouth 2 times a day.     • Dulaglutide (TRULICITY) 1.5 MG/0.5ML Solution Pen-injector Inject 1.5 mg as instructed every Friday.     • albuterol 108 (90 Base) MCG/ACT Aero Soln inhalation aerosol Inhale 2 Puffs by mouth every 6 hours as needed for Shortness of  Breath.     • Melatonin 10 MG Tab Take 10 mg by mouth every evening.     • furosemide (LASIX) 80 MG Tab Take 80 mg by mouth 1 time daily as needed.       No current facility-administered medications for this visit.          Allergies as of 06/10/2020 - Reviewed 05/27/2020   Allergen Reaction Noted   • Depakote [divalproex sodium] Unspecified 06/14/2010   • Doxycycline Anaphylaxis and Vomiting 08/15/2012   • Amitriptyline Unspecified 10/31/2013   • Aripiprazole [abilify] Unspecified 01/17/2013   • Clindamycin Nausea 02/02/2011   • Flagyl [metronidazole hcl] Unspecified 03/31/2011   • Flomax [tamsulosin hydrochloride] Swelling 09/24/2009   • Levaquin Unspecified 10/31/2013   • Metformin Unspecified 07/23/2013   • Tape Rash 08/15/2012   • Vancomycin Itching 07/10/2016   • Wound dressing adhesive Hives 01/12/2018   • Ciprofloxacin  01/16/2020   • Depakote  [divalproex sodium]  01/16/2020   • Hydromorphone hcl  01/16/2020   • Kdc:metronidazole+tartrazine  01/16/2020   • Keflex Rash 01/01/2017   • Levofloxacin Unspecified 10/27/2016   • Penicillins  01/16/2020        ROS: Denies Chest pain, SOB, LE edema.    There were no vitals taken for this visit.    Physical Exam:  Constitutional: Alert, no distress, well-groomed.  Skin: No rashes in visible areas.  Eye: Round. Conjunctiva clear, lNo icterus.   ENMT: Lips pink without lesions, good dentition, moist mucous membranes. Phonation normal.  Neck: No masses, no thyromegaly. Moves freely without pain.  CV: Pulse as reported by patient  Respiratory: Unlabored respiratory effort, no cough or audible wheeze  Psych: Alert and oriented x3, normal affect and mood.        Assessment and Plan.   29 y.o. female with the following issues.    1. Diabetes mellitus type 2 in obese (HCC)  Rechecking A1c  - HEMOGLOBIN A1C; Future    2. Urinary tract infection without hematuria, site unspecified  We will get records from outside hospital seems to be resolved.

## 2020-06-11 RX ORDER — ZIPRASIDONE HYDROCHLORIDE 40 MG/1
CAPSULE ORAL
Qty: 60 CAP | Refills: 0 | Status: SHIPPED | OUTPATIENT
Start: 2020-06-11 | End: 2020-07-17

## 2020-06-15 RX ORDER — BUSPIRONE HYDROCHLORIDE 10 MG/1
TABLET ORAL
Qty: 60 TAB | Refills: 0 | Status: SHIPPED | OUTPATIENT
Start: 2020-06-15 | End: 2020-07-31

## 2020-06-15 RX ORDER — FLUOXETINE HYDROCHLORIDE 40 MG/1
CAPSULE ORAL
Qty: 30 CAP | Refills: 0 | Status: SHIPPED | OUTPATIENT
Start: 2020-06-15 | End: 2020-07-10

## 2020-06-19 DIAGNOSIS — R10.2 PELVIC PAIN: ICD-10-CM

## 2020-06-19 RX ORDER — ONDANSETRON 4 MG/1
4 TABLET, ORALLY DISINTEGRATING ORAL EVERY 6 HOURS PRN
Qty: 60 TAB | Refills: 0 | Status: SHIPPED
Start: 2020-06-19 | End: 2020-08-15

## 2020-06-22 ENCOUNTER — TELEMEDICINE (OUTPATIENT)
Dept: MEDICAL GROUP | Facility: MEDICAL CENTER | Age: 30
End: 2020-06-22
Payer: MEDICARE

## 2020-06-22 VITALS — HEIGHT: 65 IN | WEIGHT: 255 LBS | BODY MASS INDEX: 42.49 KG/M2

## 2020-06-22 DIAGNOSIS — L60.9 NAIL PROBLEM: ICD-10-CM

## 2020-06-22 PROCEDURE — 99213 OFFICE O/P EST LOW 20 MIN: CPT | Mod: 95,CR | Performed by: FAMILY MEDICINE

## 2020-06-22 RX ORDER — NYSTATIN 100000 U/G
1 CREAM TOPICAL 2 TIMES DAILY PRN
COMMUNITY
End: 2020-10-08

## 2020-06-22 RX ORDER — CEFDINIR 300 MG/1
CAPSULE ORAL
COMMUNITY
Start: 2020-06-19 | End: 2020-08-15

## 2020-06-22 RX ORDER — NITROFURANTOIN MACROCRYSTALS 100 MG/1
100 CAPSULE ORAL DAILY
COMMUNITY
End: 2020-09-21

## 2020-06-22 RX ORDER — FLUCONAZOLE 150 MG/1
TABLET ORAL
COMMUNITY
End: 2020-07-10

## 2020-06-22 ASSESSMENT — FIBROSIS 4 INDEX: FIB4 SCORE: 0.38

## 2020-06-22 NOTE — PROGRESS NOTES
Telemedicine Visit     This encounter was conducted via Zoom .   Verbal consent was obtained. Patient's identity was verified.    cc: Nail issue    Subjective:     Kristin Balderrama is a 29 y.o. female presenting to discuss the nail issue.  She reports that her right index finger was somewhat swollen and red at the tip.  She noticed a brown spot on her nail for the past month, had been stable.  However, her nail fell off yesterday.  I was peeling off in sections.  Is occasionally quite painful.  Denies any fevers, pus, lesions elsewhere.  She has tried soaking it in water, Neosporin with minimal improvement.  She reports that she is currently on cefdinir for a bladder infection through her urologist's office.  She otherwise feels quite well.      Review of systems:  See above.  Positive for constipation      Current Outpatient Medications:   •  cefdinir (OMNICEF) 300 MG Cap, cefdinir 300 mg capsule, Disp: , Rfl:   •  ondansetron (ZOFRAN ODT) 4 MG TABLET DISPERSIBLE, Take 1 Tab by mouth every 6 hours as needed for Nausea., Disp: 60 Tab, Rfl: 0  •  busPIRone (BUSPAR) 10 MG Tab tablet, TAKE ONE TABLET BY MOUTH TWICE DAILY, Disp: 60 Tab, Rfl: 0  •  fluoxetine (PROZAC) 40 MG capsule, TAKE ONE CAPSULE BY MOUTH ONCE A DAY, Disp: 30 Cap, Rfl: 0  •  ziprasidone (GEODON) 40 MG Cap, TAKE ONE CAPSULE BY MOUTH TWICE DAILY, Disp: 60 Cap, Rfl: 0  •  acetaminophen (TYLENOL) 500 MG Tab, Take 1 Tab by mouth 4 times a day as needed for Mild Pain, Moderate Pain or Fever (Mild Pain; (Pain scale 1-3); Temp greater than 100.5 F)., Disp: 60 Tab, Rfl: 0  •  predniSONE (DELTASONE) 20 MG Tab, Take 1 Tab by mouth every day., Disp: 40 Tab, Rfl: 0  •  acetaZOLAMIDE (DIAMOX) 250 MG Tab, Take 1,000-1,500 mg by mouth 2 times a day. 1500 mg in the AM 1000 mg in the PM, Disp: , Rfl:   •  diphenhydrAMINE (BENADRYL) 12.5 MG/5ML Liquid liquid, Take 25 mg by mouth 2 times daily with meals as needed. With the Doxycycline, Disp: , Rfl:   •  diclofenac  EC (VOLTAREN) 50 MG Tablet Delayed Response, Take 50 mg by mouth 3 times a day., Disp: , Rfl:   •  insulin glargine (LANTUS SOLOSTAR) 100 UNIT/ML Solution Pen-injector injection, Inject 15 Units as instructed every evening., Disp: 5 PEN, Rfl: 3  •  lactulose 10 GM/15ML Solution, Take 15 mL by mouth 2 times a day., Disp: 1 Bottle, Rfl: 2  •  senna-docusate (PERICOLACE OR SENOKOT S) 8.6-50 MG Tab, Take 2 Tabs by mouth 2 times a day., Disp: , Rfl:   •  Ivabradine HCl (CORLANOR) 7.5 MG Tab, Take 7.5 mg by mouth 2 Times a Day. Needs to schedule follow up with Dr. Wilde, Disp: 180 Tab, Rfl: 1  •  levothyroxine (SYNTHROID) 100 MCG Tab, Take 1 Tab by mouth Every morning on an empty stomach., Disp: 30 Tab, Rfl: 2  •  vitamin D (VITAMIND D3) 1000 UNIT Tab, Take 1 Tab by mouth every day., Disp: 60 Tab, Rfl: 0  •  pregabalin (LYRICA) 300 MG capsule, Take 300 mg by mouth 2 times a day., Disp: , Rfl:   •  OXcarbazepine (TRILEPTAL) 150 MG Tab, Take 1 Tab by mouth 2 Times a Day., Disp: 60 Tab, Rfl: 2  •  traZODone (DESYREL) 100 MG Tab, Take 0.5 Tabs by mouth every evening., Disp: 30 Tab, Rfl: 3  •  budesonide-formoterol (SYMBICORT) 160-4.5 MCG/ACT Aerosol, Inhale 2 Puffs by mouth 2 Times a Day., Disp: , Rfl:   •  aspirin EC (ECOTRIN) 81 MG Tablet Delayed Response, Take 81 mg by mouth every day., Disp: , Rfl:   •  ondansetron (ZOFRAN ODT) 4 MG TABLET DISPERSIBLE, Take 4 mg by mouth every 6 hours as needed for Nausea., Disp: , Rfl:   •  potassium chloride SA (KDUR) 20 MEQ Tab CR, Take 20 mEq by mouth 2 times a day., Disp: , Rfl:   •  tiotropium (SPIRIVA) 18 MCG Cap, Inhale 1 Cap by mouth every day., Disp: 90 Cap, Rfl: 3  •  ipratropium-albuterol (DUONEB) 0.5-2.5 (3) MG/3ML nebulizer solution, 3 mL by Nebulization route every 6 hours as needed for Shortness of Breath., Disp: 60 Bullet, Rfl: 1  •  etonogestrel (NEXPLANON) 68 MG Implant implant, Inject 1 Each as instructed Once., Disp: , Rfl:   •  mycophenolate (CELLCEPT) 500 MG  "tablet, Take 1,000 mg by mouth 2 times a day., Disp: , Rfl:   •  Dulaglutide (TRULICITY) 1.5 MG/0.5ML Solution Pen-injector, Inject 1.5 mg as instructed every Friday., Disp: , Rfl:   •  albuterol 108 (90 Base) MCG/ACT Aero Soln inhalation aerosol, Inhale 2 Puffs by mouth every 6 hours as needed for Shortness of Breath., Disp: , Rfl:   •  Melatonin 10 MG Tab, Take 10 mg by mouth every evening., Disp: , Rfl:   •  furosemide (LASIX) 80 MG Tab, Take 80 mg by mouth 1 time daily as needed., Disp: , Rfl:   •  fluconazole (DIFLUCAN) 150 MG tablet, Diflucan 150 mg tablet  Take 1 tablet every day by oral route for 3 days., Disp: , Rfl:   •  nitrofurantoin (MACRODANTIN) 50 MG Cap, nitrofurantoin macrocrystal 50 mg capsule  Take 1 capsule as needed by oral route.  Take 1 capsule following sexual intercourse, Disp: , Rfl:   •  nystatin (MYCOSTATIN) 879549 UNIT/GM Cream topical cream, nystatin 100,000 unit/gram topical cream  APPLY TO THE AFFECTED AREA(S) BY TOPICAL ROUTE 2 TIMES PER DAY, Disp: , Rfl:     Allergies, past medical history, past surgical history, family history, social history reviewed and updated    Objective:     Vitals obtained by patient:  Ht 1.651 m (5' 5\")   Wt 115.7 kg (255 lb)   BMI 42.43 kg/m²     Physical Exam:  Constitutional: Alert, no distress, well-groomed.  Skin: Erythema and dried blood around the nailbed of her right index finger, somewhat difficult to visualize on the video.  Eye: Round. Conjunctiva clear, lids normal. No icterus.   ENMT: Lips pink without lesions, good dentition, moist mucous membranes. Phonation normal.  Neck: No masses. Moves freely without pain.  CV: Pulse as reported by patient  Respiratory: Unlabored respiratory effort, no cough or audible wheeze  Psych: Alert and oriented x3, normal affect and mood.       Assessment/Plan:     Kristin was seen today for nail problem.    Diagnoses and all orders for this visit:    Nail problem  New problem.  As she is already on cefdinir, " will hold off on antibiotics for now, but might consider adding Bactrim if it worsens.  Recommended that she cover the area with Vaseline and wrap it with gauze for the next week or 2.  If symptoms do not improve, she will let us know.

## 2020-06-25 RX ORDER — OXCARBAZEPINE 150 MG/1
TABLET, FILM COATED ORAL
Qty: 60 TAB | Refills: 1 | Status: SHIPPED
Start: 2020-06-25 | End: 2020-08-15

## 2020-06-29 ENCOUNTER — TELEPHONE (OUTPATIENT)
Dept: NEUROLOGY | Facility: MEDICAL CENTER | Age: 30
End: 2020-06-29

## 2020-06-29 ENCOUNTER — HOSPITAL ENCOUNTER (EMERGENCY)
Facility: MEDICAL CENTER | Age: 30
End: 2020-06-29
Attending: EMERGENCY MEDICINE
Payer: MEDICARE

## 2020-06-29 ENCOUNTER — TELEMEDICINE (OUTPATIENT)
Dept: MEDICAL GROUP | Facility: MEDICAL CENTER | Age: 30
End: 2020-06-29
Payer: MEDICARE

## 2020-06-29 VITALS — BODY MASS INDEX: 42.49 KG/M2 | TEMPERATURE: 98.7 F | HEIGHT: 65 IN | WEIGHT: 255 LBS

## 2020-06-29 VITALS
BODY MASS INDEX: 42.68 KG/M2 | WEIGHT: 256.17 LBS | SYSTOLIC BLOOD PRESSURE: 123 MMHG | HEART RATE: 70 BPM | TEMPERATURE: 97.5 F | DIASTOLIC BLOOD PRESSURE: 71 MMHG | OXYGEN SATURATION: 96 % | RESPIRATION RATE: 15 BRPM | HEIGHT: 65 IN

## 2020-06-29 DIAGNOSIS — R53.83 OTHER FATIGUE: ICD-10-CM

## 2020-06-29 DIAGNOSIS — R51.9 NONINTRACTABLE HEADACHE, UNSPECIFIED CHRONICITY PATTERN, UNSPECIFIED HEADACHE TYPE: ICD-10-CM

## 2020-06-29 DIAGNOSIS — E87.6 HYPOKALEMIA: ICD-10-CM

## 2020-06-29 PROCEDURE — 99284 EMERGENCY DEPT VISIT MOD MDM: CPT | Mod: EDC

## 2020-06-29 PROCEDURE — 700111 HCHG RX REV CODE 636 W/ 250 OVERRIDE (IP)

## 2020-06-29 PROCEDURE — 700102 HCHG RX REV CODE 250 W/ 637 OVERRIDE(OP): Mod: EDC | Performed by: EMERGENCY MEDICINE

## 2020-06-29 PROCEDURE — 99213 OFFICE O/P EST LOW 20 MIN: CPT | Mod: 95,CR | Performed by: INTERNAL MEDICINE

## 2020-06-29 RX ORDER — PROCHLORPERAZINE MALEATE 10 MG
10 TABLET ORAL EVERY 6 HOURS PRN
Qty: 15 TAB | Refills: 3 | Status: SHIPPED
Start: 2020-06-29 | End: 2020-08-15

## 2020-06-29 RX ORDER — ONDANSETRON 4 MG/1
4 TABLET, ORALLY DISINTEGRATING ORAL ONCE
Status: COMPLETED | OUTPATIENT
Start: 2020-06-29 | End: 2020-06-29

## 2020-06-29 RX ORDER — PROCHLORPERAZINE MALEATE 10 MG
10 TABLET ORAL ONCE
Status: COMPLETED | OUTPATIENT
Start: 2020-06-29 | End: 2020-06-29

## 2020-06-29 RX ADMIN — PROCHLORPERAZINE MALEATE 10 MG: 10 TABLET ORAL at 18:19

## 2020-06-29 RX ADMIN — ONDANSETRON 4 MG: 4 TABLET, ORALLY DISINTEGRATING ORAL at 14:20

## 2020-06-29 ASSESSMENT — LIFESTYLE VARIABLES: DO YOU DRINK ALCOHOL: NO

## 2020-06-29 ASSESSMENT — FIBROSIS 4 INDEX
FIB4 SCORE: 0.38
FIB4 SCORE: 0.38

## 2020-06-29 NOTE — ED TRIAGE NOTES
"Chief Complaint   Patient presents with   • Headache     \"shock to niurka\"    • Nausea   pt reports sent from [facility] pt denies visual changes, numbness or tingling, she reports generalized bilat weakness, she is A&O, denies head injury. States headache has been couple weeks. Pt aware of triage process and wearing mask.   "

## 2020-06-29 NOTE — ASSESSMENT & PLAN NOTE
This patient went to the emergency room At [facility] over the weekend with vague complaints of feeling tired and feeling as if her brain or fried.  During evaluation she was found to have a slightly low potassium she thinks of 3.2.  She does not think she eats a lot of salt.

## 2020-06-29 NOTE — PROGRESS NOTES
This encounter was conducted via Zoom meeting  Verbal consent was obtained. Patient's identity was verified.       CC: Follow-up from emergency room visit this weekend in which she was feeling tired and as if her brain were fried.  During the emergency room visit she was found to have a low potassium of 3.2.                                                                                                                                    HPI:   Kristin presents today with the following.    1. Other fatigue  Comments: She reports generally feeling a little more fatigued than usual so she went to the emergency room.  Evaluation revealed no significant abnormalities apparently except that her potassium was a little low at 3.2.  She has an appointment with her neurologist tomorrow. She will keep her appointment with the neurologist.  She will follow-up with Dr. Brody in the next few weeks.      2. Hypokalemia  Comments: During evaluation in the emergency room she was found to have a slightly low potassium of 3.2.  She does not think any other lab results were abnormal.        Patient Active Problem List    Diagnosis Date Noted   • Intracranial hypertension 02/27/2020     Priority: High   • Optic neuritis 05/27/2019     Priority: High   • Polypharmacy 06/27/2016     Priority: High   • Bilateral heel pain secondary to calcaneal bone spurs 03/28/2016     Priority: High   • Hypokalemia 09/29/2019     Priority: Medium   • Diabetes mellitus type 2 in obese (HCC) 04/13/2017     Priority: Medium   • Morbidly obese (HCC) 03/07/2016     Priority: Medium   • H/O prior ablation treatment 02/10/2016     Priority: Medium   • Immunocompromised ([facility]) 11/06/2019     Priority: Low   • History of atrial fibrillation and cardiomyopathy? 11/06/2019     Priority: Low   • Generalized weakness 09/30/2019     Priority: Low   • Hypothyroidism 08/04/2017     Priority: Low   • Depression 10/28/2016     Priority: Low   • Schizophrenia ([facility])  10/27/2016     Priority: Low   • GERD (gastroesophageal reflux disease) 03/07/2016     Priority: Low   • Peripheral neuropathy and chornic pain syndrome ([facility]) 03/06/2016     Priority: Low   • Fatty liver disease, nonalcoholic 01/19/2015     Priority: Low   • Anxiety 12/16/2014     Priority: Low   • Knee pain, right 02/13/2014     Priority: Low   • Borderline personality disorder in adult ([facility]) 09/18/2010     Priority: Low   • Other fatigue 06/29/2020   • Type 2 diabetes mellitus without complication, with long-term current use of insulin ([facility]) 05/11/2020   • History of supraventricular tachycardia 04/20/2020   • Schizoaffective disorder, depressive type ([facility]) 03/02/2020   • PTSD (post-traumatic stress disorder) 03/02/2020   • Stress incontinence 12/27/2019   • Mixed stress and urge urinary incontinence 12/27/2019   • Increased frequency of urination 12/27/2019   • History of optic neuritis 12/17/2019   • Mild intermittent asthma without complication 12/16/2019   • Closed head injury 11/30/2019   • Hypocalcemia 11/30/2019   • SVT (supraventricular tachycardia) ([facility]) 04/10/2019   • Functional diarrhea 01/05/2018   • Bowel and bladder incontinence 10/27/2016   • Galactorrhea 07/22/2016   • Scoliosis 03/07/2016       Current Outpatient Medications   Medication Sig Dispense Refill   • OXcarbazepine (TRILEPTAL) 150 MG Tab TAKE ONE TABLET BY MOUTH TWICE DAILY 60 Tab 1   • cefdinir (OMNICEF) 300 MG Cap cefdinir 300 mg capsule     • fluconazole (DIFLUCAN) 150 MG tablet Diflucan 150 mg tablet   Take 1 tablet every day by oral route for 3 days.     • nitrofurantoin (MACRODANTIN) 50 MG Cap nitrofurantoin macrocrystal 50 mg capsule   Take 1 capsule as needed by oral route.   Take 1 capsule following sexual intercourse     • nystatin (MYCOSTATIN) 834615 UNIT/GM Cream topical cream nystatin 100,000 unit/gram topical cream   APPLY TO THE AFFECTED AREA(S) BY TOPICAL ROUTE 2 TIMES PER DAY     • ondansetron (ZOFRAN ODT) 4 MG  TABLET DISPERSIBLE Take 1 Tab by mouth every 6 hours as needed for Nausea. 60 Tab 0   • busPIRone (BUSPAR) 10 MG Tab tablet TAKE ONE TABLET BY MOUTH TWICE DAILY 60 Tab 0   • fluoxetine (PROZAC) 40 MG capsule TAKE ONE CAPSULE BY MOUTH ONCE A DAY 30 Cap 0   • ziprasidone (GEODON) 40 MG Cap TAKE ONE CAPSULE BY MOUTH TWICE DAILY 60 Cap 0   • acetaminophen (TYLENOL) 500 MG Tab Take 1 Tab by mouth 4 times a day as needed for Mild Pain, Moderate Pain or Fever (Mild Pain; (Pain scale 1-3); Temp greater than 100.5 F). 60 Tab 0   • predniSONE (DELTASONE) 20 MG Tab Take 1 Tab by mouth every day. 40 Tab 0   • acetaZOLAMIDE (DIAMOX) 250 MG Tab Take 1,000-1,500 mg by mouth 2 times a day. 1500 mg in the AM  1000 mg in the PM     • diphenhydrAMINE (BENADRYL) 12.5 MG/5ML Liquid liquid Take 25 mg by mouth 2 times daily with meals as needed. With the Doxycycline     • diclofenac EC (VOLTAREN) 50 MG Tablet Delayed Response Take 50 mg by mouth 3 times a day.     • insulin glargine (LANTUS SOLOSTAR) 100 UNIT/ML Solution Pen-injector injection Inject 15 Units as instructed every evening. 5 PEN 3   • lactulose 10 GM/15ML Solution Take 15 mL by mouth 2 times a day. 1 Bottle 2   • senna-docusate (PERICOLACE OR SENOKOT S) 8.6-50 MG Tab Take 2 Tabs by mouth 2 times a day.     • Ivabradine HCl (CORLANOR) 7.5 MG Tab Take 7.5 mg by mouth 2 Times a Day. Needs to schedule follow up with Dr. Wilde 180 Tab 1   • levothyroxine (SYNTHROID) 100 MCG Tab Take 1 Tab by mouth Every morning on an empty stomach. 30 Tab 2   • vitamin D (VITAMIND D3) 1000 UNIT Tab Take 1 Tab by mouth every day. 60 Tab 0   • pregabalin (LYRICA) 300 MG capsule Take 300 mg by mouth 2 times a day.     • traZODone (DESYREL) 100 MG Tab Take 0.5 Tabs by mouth every evening. 30 Tab 3   • budesonide-formoterol (SYMBICORT) 160-4.5 MCG/ACT Aerosol Inhale 2 Puffs by mouth 2 Times a Day.     • aspirin EC (ECOTRIN) 81 MG Tablet Delayed Response Take 81 mg by mouth every day.     •  "ondansetron (ZOFRAN ODT) 4 MG TABLET DISPERSIBLE Take 4 mg by mouth every 6 hours as needed for Nausea.     • potassium chloride SA (KDUR) 20 MEQ Tab CR Take 20 mEq by mouth 2 times a day.     • tiotropium (SPIRIVA) 18 MCG Cap Inhale 1 Cap by mouth every day. 90 Cap 3   • ipratropium-albuterol (DUONEB) 0.5-2.5 (3) MG/3ML nebulizer solution 3 mL by Nebulization route every 6 hours as needed for Shortness of Breath. 60 Bullet 1   • etonogestrel (NEXPLANON) 68 MG Implant implant Inject 1 Each as instructed Once.     • mycophenolate (CELLCEPT) 500 MG tablet Take 1,000 mg by mouth 2 times a day.     • Dulaglutide (TRULICITY) 1.5 MG/0.5ML Solution Pen-injector Inject 1.5 mg as instructed every Friday.     • albuterol 108 (90 Base) MCG/ACT Aero Soln inhalation aerosol Inhale 2 Puffs by mouth every 6 hours as needed for Shortness of Breath.     • Melatonin 10 MG Tab Take 10 mg by mouth every evening.     • furosemide (LASIX) 80 MG Tab Take 80 mg by mouth 1 time daily as needed.       No current facility-administered medications for this visit.          Allergies as of 06/29/2020 - Reviewed 06/22/2020   Allergen Reaction Noted   • Depakote [divalproex sodium] Unspecified 06/14/2010   • Doxycycline Anaphylaxis and Vomiting 08/15/2012   • Amitriptyline Unspecified 10/31/2013   • Aripiprazole [abilify] Unspecified 01/17/2013   • Clindamycin Nausea 02/02/2011   • Flagyl [metronidazole hcl] Unspecified 03/31/2011   • Flomax [tamsulosin hydrochloride] Swelling 09/24/2009   • Levaquin Unspecified 10/31/2013   • Metformin Unspecified 07/23/2013   • Tape Rash 08/15/2012   • Vancomycin Itching 07/10/2016   • Wound dressing adhesive Hives 01/12/2018   • Ciprofloxacin  01/16/2020   • Hydromorphone hcl  01/16/2020   • Keflex Rash 01/01/2017   • Levofloxacin Unspecified 10/27/2016   • Penicillins  01/16/2020        ROS:  Denies, chest pain, Shortness of breath, Edema.     Temp 37.1 °C (98.7 °F)   Ht 1.651 m (5' 5\")   Wt 115.7 kg (255 " lb)   BMI 42.43 kg/m²       Physical Exam:  Constitutional: Alert, no distress, well-groomed.  Skin: No rashes in visible areas.  Eye: Round. Conjunctiva clear, lNo icterus.   ENMT: Lips pink without lesions, good dentition, moist mucous membranes. Phonation normal.  Neck: No masses, no thyromegaly. Moves freely without pain.  CV: Pulse as reported by patient  Respiratory: Unlabored respiratory effort, no cough or audible wheeze  Psych: Alert and oriented x3, normal affect and mood.          Assessment and Plan.   29 y.o. female with the following issues.    1. Other fatigue  She will keep her follow-up appointment with the neurologist tomorrow and with Dr. Brody in the next few weeks.    2. Hypokalemia  She will follow low-salt diet and will concentrate on eating more foods that are high in potassium.

## 2020-06-29 NOTE — ED PROVIDER NOTES
"ER Provider Note     Scribed for Noman Zavala M.D. by Ruth Ann Lopez. 6/29/2020, 4:52 PM.    Primary Care Provider: Torres Brody M.D.  Means of Arrival: wheel chair   History obtained from: Patient  History limited by: None     CHIEF COMPLAINT  Chief Complaint   Patient presents with   • Headache     \"shock to niurka\"    • Nausea       HPI  Kristin Balderrama is a 29 y.o. female with a complicated medical history including transverse myelitis who presents to the Emergency Department for evaluation of several intermittent episodes of a \"shock\" like sensations felt in her head that have been ongoing for the last several week, gradually worsening in frequency. These episode are occurring 3-4x per day, followed with 30 minutes of a headache, prior to resolving completely. Patient has an evaluation with a Neurologist scheduled tomorrow to evaluate these symptoms, and it was this office that she called today who advised her to come to the ED due to worsening of these episodes. Patient has a very complicated medical history on several medications including several neurological medications.    PPE Note: I personally donned full PPE for all patient encounters during this visit, including being clean-shaven with an N95 respirator mask, gloves, gown, and goggles.     Scribe remained outside the patient's room and did not have any contact with the patient for the duration of patient encounter.     REVIEW OF SYSTEMS  See HPI for further details.    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    SURGICAL HISTORY   has a past surgical history that includes neuro dest facet l/s w/ig sngl (4/21/2015); recovery (1/27/2016); [place] by laparoscopy (8/29/2010); lumbar fusion anterior (8/21/2012); other cardiac surgery (1/2016); tonsillectomy; bowel stimulator insertion (7/13/2016); gastroscopy with balloon dilatation (N/A, 1/4/2017); muscle biopsy (Right, 1/26/2017); other abdominal surgery; and laminotomy.    SOCIAL HISTORY  Social History     Tobacco Use   • Smoking status: Never Smoker   • Smokeless tobacco: Never Used   Substance Use Topics   • Alcohol use: No     Alcohol/week: 0.0 oz   • Drug use: Not Currently     Frequency: 7.0 times per week     Types: Marijuana, Oral     Comment: Medicinal edible's      Social History     Substance and Sexual Activity   Drug Use Not Currently   • Frequency: 7.0 times per week   • Types: Marijuana, Oral    Comment: Medicinal edible's       FAMILY HISTORY  Family History   Problem Relation Age of Onset   • Hypertension Mother    • Sleep Apnea Mother    • Heart Disease Mother    • Other Mother         hypothryod   • Hypertension Maternal Uncle    • Heart Disease Maternal Grandmother    • Hypertension Maternal Grandmother    • No Known Problems Sister    • Other Sister         Narcolepsy;fibromyalsia;bone;nerve   • Genitourinary () Problems Sister         endometriosis       CURRENT MEDICATIONS  Home Medications     Reviewed by Lety Ackerman R.N. (Registered Nurse) on  06/29/20 at 1345  Med List Status: Partial   Medication Last Dose Status   acetaminophen (TYLENOL) 500 MG Tab  Active   acetaZOLAMIDE (DIAMOX) 250 MG Tab  Active   albuterol 108 (90 Base) MCG/ACT Aero Soln inhalation aerosol  Active   aspirin EC (ECOTRIN) 81 MG Tablet Delayed Response  Active   budesonide-formoterol (SYMBICORT) 160-4.5 MCG/ACT Aerosol  Active   busPIRone (BUSPAR) 10 MG Tab tablet  Active   cefdinir (OMNICEF) 300 MG Cap  Active   diclofenac EC (VOLTAREN) 50 MG Tablet Delayed Response  Active   diphenhydrAMINE (BENADRYL) 12.5 MG/5ML Liquid liquid  Active   Dulaglutide (TRULICITY) 1.5 MG/0.5ML Solution Pen-injector  Active   etonogestrel (NEXPLANON) 68 MG Implant implant  Active   fluconazole (DIFLUCAN) 150 MG tablet  Active   fluoxetine (PROZAC) 40 MG capsule  Active   furosemide (LASIX) 80 MG Tab  Active   insulin glargine (LANTUS SOLOSTAR) 100 UNIT/ML Solution Pen-injector injection  Active   ipratropium-albuterol (DUONEB) 0.5-2.5 (3) MG/3ML nebulizer solution  Active   Ivabradine HCl (CORLANOR) 7.5 MG Tab  Active   lactulose 10 GM/15ML Solution  Active   levothyroxine (SYNTHROID) 100 MCG Tab  Active   Melatonin 10 MG Tab  Active   mycophenolate (CELLCEPT) 500 MG tablet  Active   nitrofurantoin (MACRODANTIN) 50 MG Cap  Active   nystatin (MYCOSTATIN) 756073 UNIT/GM Cream topical cream  Active   ondansetron (ZOFRAN ODT) 4 MG TABLET DISPERSIBLE  Active   ondansetron (ZOFRAN ODT) 4 MG TABLET DISPERSIBLE  Active   OXcarbazepine (TRILEPTAL) 150 MG Tab  Active   potassium chloride SA (KDUR) 20 MEQ Tab CR  Active   predniSONE (DELTASONE) 20 MG Tab  Active   pregabalin (LYRICA) 300 MG capsule  Active   senna-docusate (PERICOLACE OR SENOKOT S) 8.6-50 MG Tab  Active   tiotropium (SPIRIVA) 18 MCG Cap  Active   traZODone (DESYREL) 100 MG Tab  Active   vitamin D (VITAMIND D3) 1000 UNIT Tab  Active   ziprasidone (GEODON) 40 MG Cap  Active                ALLERGIES  Allergies   Allergen Reactions   • Depakote  "[Divalproex Sodium] Unspecified     Muscle spasms/muscle pain and weakness     • Doxycycline Anaphylaxis and Vomiting     RXN=unknown   • Amitriptyline Unspecified     Headaches     • Aripiprazole [Abilify] Unspecified     Headaches/muscle twitching     • Clindamycin Nausea     Even with food     • Flagyl [Metronidazole Hcl] Unspecified     \"eye problems\"     • Flomax [Tamsulosin Hydrochloride] Swelling   • Levaquin Unspecified     Severe muscle cramps in legs  RXN=unknown   • Metformin Unspecified     Increased lactic acid      • Tape Rash     Tears skin off  coban with Tegaderm tape ok intermittently  RXN=ongoing   • Vancomycin Itching     Pt becomes flushed in face and chest.   RXN=7/10/16   • Wound Dressing Adhesive Hives     By pt report   • Ciprofloxacin    • Hydromorphone Hcl      Pt states she feels loopy   • Keflex Rash     Pt states she gets a rash with this medication  Tolerates ceftriaxone   • Levofloxacin Unspecified     Leg muscle cramps   • Penicillins        PHYSICAL EXAM  VITAL SIGNS: /69   Pulse 79   Temp 36.2 °C (97.1 °F) (Temporal)   Resp 20   Ht 1.651 m (5' 5\")   Wt 116.2 kg (256 lb 2.8 oz)   SpO2 96%   BMI 42.63 kg/m²      Constitutional: Alert in no apparent distress. Catheter in place  HENT: No signs of trauma, Bilateral external ears normal, Nose normal.   Eyes: Pupils are equal and reactive, Conjunctiva normal, Non-icteric.   Neck: Normal range of motion  Skin: Warm, Dry, No erythema, No rash.   Extremities: No edema, No tenderness, No cyanosis.  Musculoskeletal: Good range of motion in all major joints. No tenderness to palpation or major deformities noted.   Neurologic: Alert , 5/5 strength intact and equal BUE, clonus in BLE, baseline for the patient, Normal sensory function, No focal deficits noted.   Psychiatric: Affect normal, Judgment normal, Mood normal.     COURSE & MEDICAL DECISION MAKING  Pertinent Labs & Imaging studies reviewed. (See chart for details)    PPE Note: " I personally donned full PPE for all patient encounters during this visit, including being clean-shaven with an N95 respirator mask, gloves, gown, and goggles.     Scribe remained outside the patient's room and did not have any contact with the patient for the duration of patient encounter.     This is a 29 y.o. female that presents with headache.  This headache is been chronic for the patient.  She is seeing neurology tomorrow.  She has no neurologic deficits at this time that are new.  She does have hyper recommends flaccidity in her lower extremities which she has had from transverse myelitis.  She does not appear to be any significant distress at this time.  We will treat her with Compazine.  I do not believe this is an intracranial bleed.  I do not believe that neuroimaging is critical at this time however I do believe an MRI is necessary as an outpatient..     4:52 PM - Patient seen and examined at bedside. Patient will be medicated with 10mg oral Compazine and 4mg oral Zofran for her symptoms. I discussed with the patient that I agreed with her that the next best step for her to begin management of these episodes, is to follow up with a Neurologist as she already has scheduled. I explained that I would order for Compazine, to see if this can help manage some of her symptoms, so that she can keep her appointment with Neurology for tomorrow and begin an appropriate work up. We discussed that at this moment, aside from the plan above, there were no other Emergent Interventions required. Patient is very understanding of this and is agreeable with the stated plan.    7:22 PM I re-evaluated patient at bedside. Patient reports feeling about the same following the compazine.  We will have her trial this at home.  She does have neurology follow-up tomorrow.  We spoke again that there was very little to do from an ER standpoint and that the best treatment plan is to discharge her home on compazine for her to follow up  with neurology tomorrow. At this point there are no concerns for an emergent process causing her headaches. Patient understands and agrees with discharge on compazine.    The patient will return for new or worsening symptoms and is stable at the time of discharge.    DISPOSITION:  Patient will be discharged home in stable condition.    FOLLOW UP:  Follow-up scheduled with her neurologist tomorrow.    OUTPATIENT MEDICATIONS:  Discharge Medication List as of 6/29/2020  7:23 PM      START taking these medications    Details   prochlorperazine (COMPAZINE) 10 MG Tab Take 1 Tab by mouth every 6 hours as needed for Nausea/Vomiting (headache)., Disp-15 Tab,R-3, Normal             FINAL IMPRESSION  1. Nonintractable headache, unspecified chronicity pattern, unspecified headache type          I, Ruth Ann Lopez (Scribe), am scribing for, and in the presence of, Noman Zavala M.D..    Electronically signed by: Ruth Ann Lopez (Scribe), 6/29/2020    I, Noman Zavala M.D. personally performed the services described in this documentation, as scribed by Ruth Ann Lopez in my presence, and it is both accurate and complete.     The note accurately reflects work and decisions made by me.  Noman Zavala M.D.  6/29/2020  8:19 PM

## 2020-06-29 NOTE — ED NOTES
Pt stated she is feeling worse. Revitalized and was still within normal limits. Explained triage process to patient

## 2020-06-29 NOTE — ASSESSMENT & PLAN NOTE
She was seen in the emergency room at [facility] over the weekend with a feeling of being tired and a feeling as if her brain were fried she reports.  Apparently no significant abnormalities were found except for slightly low potassium of 3.2.  She is scheduled to see her neurologist tomorrow.  She reports that she generally feels better today although she just got up from bed.

## 2020-06-29 NOTE — TELEPHONE ENCOUNTER
Returned patient's call. She was stating her symptoms are getting much worse. I suggested ER but she wanted to tell me what's been happening. Patient's phone was fading in and out and then could not hear her. Called back several times and straight to . Left message to reurn call.

## 2020-06-30 ENCOUNTER — OFFICE VISIT (OUTPATIENT)
Dept: NEUROLOGY | Facility: MEDICAL CENTER | Age: 30
End: 2020-06-30
Payer: MEDICARE

## 2020-06-30 VITALS
DIASTOLIC BLOOD PRESSURE: 88 MMHG | HEART RATE: 86 BPM | BODY MASS INDEX: 42.49 KG/M2 | SYSTOLIC BLOOD PRESSURE: 122 MMHG | HEIGHT: 65 IN | OXYGEN SATURATION: 96 % | WEIGHT: 255 LBS | RESPIRATION RATE: 16 BRPM | TEMPERATURE: 98 F

## 2020-06-30 DIAGNOSIS — R51.9 BILATERAL HEADACHES: ICD-10-CM

## 2020-06-30 DIAGNOSIS — G44.52 NEW PERSISTENT DAILY HEADACHE: ICD-10-CM

## 2020-06-30 DIAGNOSIS — H46.9 OPTIC NEURITIS: ICD-10-CM

## 2020-06-30 PROCEDURE — 99214 OFFICE O/P EST MOD 30 MIN: CPT

## 2020-06-30 RX ORDER — TOPIRAMATE 25 MG/1
25 TABLET ORAL EVERY EVENING
Qty: 180 TAB | Refills: 3 | Status: SHIPPED
Start: 2020-06-30 | End: 2020-08-15

## 2020-06-30 RX ORDER — RIZATRIPTAN BENZOATE 5 MG/1
1 TABLET, ORALLY DISINTEGRATING ORAL
Qty: 10 TAB | Refills: 3 | Status: SHIPPED
Start: 2020-06-30 | End: 2020-08-15

## 2020-06-30 ASSESSMENT — ENCOUNTER SYMPTOMS
WEAKNESS: 1
HALLUCINATIONS: 1
BACK PAIN: 1
PHOTOPHOBIA: 1
DEPRESSION: 1
NERVOUS/ANXIOUS: 1
NECK PAIN: 1
HEADACHES: 1
RESPIRATORY NEGATIVE: 1
MYALGIAS: 1
SENSORY CHANGE: 1
TINGLING: 1
GASTROINTESTINAL NEGATIVE: 1
CARDIOVASCULAR NEGATIVE: 1

## 2020-06-30 ASSESSMENT — FIBROSIS 4 INDEX: FIB4 SCORE: 0.38

## 2020-06-30 NOTE — PROGRESS NOTES
"CC headache     HPI  30 yo female with complex medical history which includes CRION syndrome and history of transverse myelitis with bladder stimulator (not penelope to have MRIs) following with sher Domingo intracranial hypertension without pappiledema presents for worsening of her bilateral headaches.The headaches are mostly bifrontal and radiate behind her eyes.  She reports no blurry vision and no visual aura. She started developing vert frequent intermittent episodes of a \"shock\" like sensations felt in her head that have been ongoing for the last several week, gradually worsening in frequency. These episode are occurring 3-4x per day, followed with 30 minutes of a headache, prior to resolving completely.The pain is shock like and throbbing at the same time and can happen on right or left side of her head and sometimes bilaterally.  She is in a wheelchair since her transverse myelitis she is able to move all extremities however does not feel steady to walk unassisted and has lots of jerky spastic movements in arms and legs when attempting to move.  Saw Dr Fox for fluctuating quadriparesis in 2017.      Medications:  Diamox 2500 mg daily   Prednisone 15 mg daily   cellcept 1000 mg bid   Trazodone 50 mg po   lyrica 300 mg bid     Headache holocerphalic   Behind eyes  Tylenol she uses three of fourt imes daily  No blurry vision   Used to use zomig nasal spray and it helped her  Photo   Phonophobia  Heavy headache   Behind eyes mainly     Tylenol doing 3 times per day and does notice brief relief in headaches then they come again.    Review of Systems   Constitutional: Positive for malaise/fatigue.   HENT: Negative.    Eyes: Positive for photophobia.   Respiratory: Negative.    Cardiovascular: Negative.    Gastrointestinal: Negative.    Genitourinary: Positive for dysuria and frequency.   Musculoskeletal: Positive for back pain, joint pain, myalgias and neck pain.   Skin: Negative.    Neurological: Positive " "for tingling, sensory change, weakness and headaches.   Endo/Heme/Allergies: Negative.    Psychiatric/Behavioral: Positive for depression and hallucinations. The patient is nervous/anxious.        Past Medical History:   Diagnosis Date   • Abdominal pain    • Anginal syndrome     random chest pain especially with tachycardia   • Apnea, sleep    • Arrhythmia     \"sinus tachycardia\", cariologist, Dr. Kumar; ablation 2/2016   • Arthritis     osteo   • ASTHMA     when around smoke   • Atrial fibrillation (HCC)    • Back pain    • Borderline personality disorder (HCC)    • Breath shortness     with tachycardia   • Bronchitis    • Cardiac arrhythmia    • Chickenpox    • Chronic UTI 9/18/2010   • Cough    • Daytime sleepiness    • Depression    • Diabetes (HCC)    • Diarrhea    • Disorder of thyroid    • Fall    • Fatigue    • Frequent headaches    • Gasping for breath    • Gynecological disorder     PCOS   • Headache(784.0)    • Heart burn    • History of falling    • Hypertension    • Indigestion    • Migraine    • 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HCC)    • 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HCC)    • Nausea    • Obesity    • Other fatigue 6/29/2020   • Pain 08-15-12    back, 7/10   • Painful joint    • PCOS (polycystic ovarian syndrome)    • Pneumonia 2012   • Psychosis (HCC)    • Renal disorder     \"kidney disease, stage 1\" nephrologist, Dr. 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   • Urinary bladder disorder     Suprapubic cath   • Urinary incontinence    • Weakness    • Wears glasses      Past Surgical History:   Procedure Laterality Date   • MUSCLE BIOPSY Right 1/26/2017    Procedure: MUSCLE BIOPSY - THIGH;  Surgeon: Isidro Vigil M.D.;  Location: SURGERY [facility];  Service:    • GASTROSCOPY WITH " BALLOON DILATATION N/A 1/4/2017    Procedure: GASTROSCOPY WITH DILATATION;  Surgeon: Torres Vargas M.D.;  Location: SURGERY [facility];  Service:    • BOWEL STIMULATOR INSERTION  7/13/2016    Procedure: BOWEL STIMULATOR INSERTION FOR PERMANENT INTERSTIM SACRAL IMPLANT;  Surgeon: Joe Noyola M.D.;  Location: SURGERY [facility];  Service:    • RECOVERY  1/27/2016    Procedure: CATH LAB EP STUDY WITH SINUS NODE MODIFICATION LA;  Surgeon: [facility]on[facility];  Location: SURGERY PRE-POST PROC UNIT [facility];  Service:    • OTHER CARDIAC SURGERY  1/2016    cardiac ablation   • NEURO DEST FACET L/S W/IG SNGL  4/21/2015    Performed by Reza Tabor at SURGERY [facility]   • LUMBAR FUSION ANTERIOR  8/21/2012    Performed by SUSIE SAWANT at SURGERY [facility]   • ALYSSA BY LAPAROSCOPY  8/29/2010    Performed by SHAYY JOHNS at SURGERY [facility]   • LAMINOTOMY     • OTHER ABDOMINAL SURGERY     • TONSILLECTOMY      tonsillectomy     Family History   Problem Relation Age of Onset   • Hypertension Mother    • Sleep Apnea Mother    • Heart Disease Mother    • Other Mother         hypothryod   • Hypertension Maternal Uncle    • Heart Disease Maternal Grandmother    • Hypertension Maternal Grandmother    • No Known Problems Sister    • Other Sister         Narcolepsy;fibromyalsia;bone;nerve   • Genitourinary () Problems Sister         endometriosis     Social History     Socioeconomic History   • Marital status: Single     Spouse name: Not on file   • Number of children: Not on file   • Years of education: Not on file   • Highest education level: Not on file   Occupational History   • Not on file   Social Needs   • Financial resource strain: Not on file   • Food insecurity     Worry: Not on file     Inability: Not on file   • Transportation needs     Medical: Not on file     Non-medical: Not on file   Tobacco Use   • Smoking status: Never Smoker   • Smokeless tobacco: Never Used   Substance and  "Sexual Activity   • Alcohol use: No     Alcohol/week: 0.0 oz   • Drug use: Not Currently     Frequency: 7.0 times per week     Types: Marijuana, Oral     Comment: Medicinal edible's   • Sexual activity: Not Currently     Birth control/protection: Pill   Lifestyle   • Physical activity     Days per week: Not on file     Minutes per session: Not on file   • Stress: Not on file   Relationships   • Social connections     Talks on phone: Not on file     Gets together: Not on file     Attends [employer] service: Not on file     Active member of club or organization: Not on file     Attends meetings of clubs or organizations: Not on file     Relationship status: Not on file   • Intimate partner violence     Fear of current or ex partner: Not on file     Emotionally abused: Not on file     Physically abused: Not on file     Forced sexual activity: Not on file   Other Topics Concern   • Not on file   Social History Narrative    ** Merged History Encounter **          Allergies   Allergen Reactions   • Depakote [Divalproex Sodium] Unspecified     Muscle spasms/muscle pain and weakness     • Doxycycline Anaphylaxis and Vomiting     RXN=unknown   • Amitriptyline Unspecified     Headaches     • Aripiprazole [Abilify] Unspecified     Headaches/muscle twitching     • Clindamycin Nausea     Even with food     • Flagyl [Metronidazole Hcl] Unspecified     \"eye problems\"     • Flomax [Tamsulosin Hydrochloride] Swelling   • Levaquin Unspecified     Severe muscle cramps in legs  RXN=unknown   • Metformin Unspecified     Increased lactic acid      • Tape Rash     Tears skin off  coban with Tegaderm tape ok intermittently  RXN=ongoing   • Vancomycin Itching     Pt becomes flushed in face and chest.   RXN=7/10/16   • Wound Dressing Adhesive Hives     By pt report   • Ciprofloxacin    • Hydromorphone Hcl      Pt states she feels loopy   • Keflex Rash     Pt states she gets a rash with this medication  Tolerates ceftriaxone   • Levofloxacin " "Unspecified     Leg muscle cramps   • Penicillins    Encounter Vitals  Standard Vitals  Vitals  Blood Pressure: 122/88  Temperature: 36.7 °C (98 °F)  Temp src: Temporal  Pulse: 86  Respiration: 16  Pulse Oximetry: 96 %  Height: 165.1 cm (5' 5\")  Weight: 115.7 kg (255 lb)  Encounter Vitals  Temperature: 36.7 °C (98 °F)  Temp src: Temporal  Blood Pressure: 122/88  Pulse: 86  Respiration: 16  Pulse Oximetry: 96 %  Weight: 115.7 kg (255 lb)  Height: 165.1 cm (5' 5\")  BMI (Calculated): 42.43  Pulmonary-Specific Vitals     Durable Medical Equipment-Specific Vitals     Current Outpatient Medications on File Prior to Visit   Medication Sig Dispense Refill   • prochlorperazine (COMPAZINE) 10 MG Tab Take 1 Tab by mouth every 6 hours as needed for Nausea/Vomiting (headache). 15 Tab 3   • OXcarbazepine (TRILEPTAL) 150 MG Tab TAKE ONE TABLET BY MOUTH TWICE DAILY 60 Tab 1   • nitrofurantoin (MACRODANTIN) 50 MG Cap nitrofurantoin macrocrystal 50 mg capsule   Take 1 capsule as needed by oral route.   Take 1 capsule following sexual intercourse     • nystatin (MYCOSTATIN) 697591 UNIT/GM Cream topical cream nystatin 100,000 unit/gram topical cream   APPLY TO THE AFFECTED AREA(S) BY TOPICAL ROUTE 2 TIMES PER DAY     • busPIRone (BUSPAR) 10 MG Tab tablet TAKE ONE TABLET BY MOUTH TWICE DAILY 60 Tab 0   • fluoxetine (PROZAC) 40 MG capsule TAKE ONE CAPSULE BY MOUTH ONCE A DAY 30 Cap 0   • ziprasidone (GEODON) 40 MG Cap TAKE ONE CAPSULE BY MOUTH TWICE DAILY 60 Cap 0   • acetaminophen (TYLENOL) 500 MG Tab Take 1 Tab by mouth 4 times a day as needed for Mild Pain, Moderate Pain or Fever (Mild Pain; (Pain scale 1-3); Temp greater than 100.5 F). 60 Tab 0   • predniSONE (DELTASONE) 20 MG Tab Take 1 Tab by mouth every day. (Patient taking differently: Take 15 mg by mouth every day.) 40 Tab 0   • acetaZOLAMIDE (DIAMOX) 250 MG Tab Take 1,000-1,500 mg by mouth 2 times a day. 1500 mg in the AM  1000 mg in the PM     • diphenhydrAMINE (BENADRYL) 12.5 " MG/5ML Liquid liquid Take 25 mg by mouth 2 times daily with meals as needed. With the Doxycycline     • insulin glargine (LANTUS SOLOSTAR) 100 UNIT/ML Solution Pen-injector injection Inject 15 Units as instructed every evening. 5 PEN 3   • lactulose 10 GM/15ML Solution Take 15 mL by mouth 2 times a day. 1 Bottle 2   • senna-docusate (PERICOLACE OR SENOKOT S) 8.6-50 MG Tab Take 2 Tabs by mouth 2 times a day.     • Ivabradine HCl (CORLANOR) 7.5 MG Tab Take 7.5 mg by mouth 2 Times a Day. Needs to schedule follow up with Dr. Wilde 180 Tab 1   • levothyroxine (SYNTHROID) 100 MCG Tab Take 1 Tab by mouth Every morning on an empty stomach. (Patient taking differently: Take 75 mcg by mouth Every morning on an empty stomach.) 30 Tab 2   • vitamin D (VITAMIND D3) 1000 UNIT Tab Take 1 Tab by mouth every day. 60 Tab 0   • pregabalin (LYRICA) 300 MG capsule Take 300 mg by mouth 2 times a day.     • traZODone (DESYREL) 100 MG Tab Take 0.5 Tabs by mouth every evening. 30 Tab 3   • budesonide-formoterol (SYMBICORT) 160-4.5 MCG/ACT Aerosol Inhale 2 Puffs by mouth 2 Times a Day.     • aspirin EC (ECOTRIN) 81 MG Tablet Delayed Response Take 81 mg by mouth every day.     • ondansetron (ZOFRAN ODT) 4 MG TABLET DISPERSIBLE Take 4 mg by mouth every 6 hours as needed for Nausea.     • potassium chloride SA (KDUR) 20 MEQ Tab CR Take 20 mEq by mouth 2 times a day.     • tiotropium (SPIRIVA) 18 MCG Cap Inhale 1 Cap by mouth every day. 90 Cap 3   • ipratropium-albuterol (DUONEB) 0.5-2.5 (3) MG/3ML nebulizer solution 3 mL by Nebulization route every 6 hours as needed for Shortness of Breath. 60 Bullet 1   • etonogestrel (NEXPLANON) 68 MG Implant implant Inject 1 Each as instructed Once.     • mycophenolate (CELLCEPT) 500 MG tablet Take 1,000 mg by mouth 2 times a day.     • Dulaglutide (TRULICITY) 1.5 MG/0.5ML Solution Pen-injector Inject 1.5 mg as instructed every Friday.     • albuterol 108 (90 Base) MCG/ACT Aero Soln inhalation aerosol  Inhale 2 Puffs by mouth every 6 hours as needed for Shortness of Breath.     • Melatonin 10 MG Tab Take 10 mg by mouth every evening.     • furosemide (LASIX) 80 MG Tab Take 80 mg by mouth 1 time daily as needed.     • cefdinir (OMNICEF) 300 MG Cap cefdinir 300 mg capsule     • fluconazole (DIFLUCAN) 150 MG tablet Diflucan 150 mg tablet   Take 1 tablet every day by oral route for 3 days.     • ondansetron (ZOFRAN ODT) 4 MG TABLET DISPERSIBLE Take 1 Tab by mouth every 6 hours as needed for Nausea. 60 Tab 0   • diclofenac EC (VOLTAREN) 50 MG Tablet Delayed Response Take 50 mg by mouth 3 times a day.       No current facility-administered medications on file prior to visit.        Exam  Physical Exam     She appears in no acute distress. Vital signs are stable, she is morbidly obese. There is no malar rash or jaw claudication. The neck is supple, range of motion is full, Lhermitte phenomena is absent. Cardiac evaluation is unremarkable. Carotid pulses are present without asymmetry.     She is fully oriented, there is no aphasia. Cranial nerve exam is unremarkable. There is weakness with all 4 extremities with significant inconsistency with effort. Exam suggests profound hip flexion weakness at 4 minus/5, knee flexors and extensors at about 4/5, distally with plantar and dorsiflexion at 4/5. Reflexes are still present at all points, nontender drop, both toes are downgoing. She is able to stand up on her own  reaches for her wheelchair and ambulates without a foot drop or need to elevate the hips. Sensory exam is remarkable for subjective decrement of vibration to the knees, pinprick below the knees, and pinprick and vibration in the hands. Joint position sense is impaired to the knees bilaterally. It is intact in the hands.    Imaging and labs   Reviewed      Impression and plan   28 yo with fluctuating quadriparesis since 2016, GABE, ? 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 could not get MRI as she has bladder stimulator) presents for  frequent headaches.She reports pseudotumor cerebri without papilledema.  She is on Diamox 2500 mg daily.  My impression is that there is a significant functional component to today's examination.  Plan for headaches   Start Topamax 25 mg nightly for 7 days then increase to 50 mg nightly   Mag oxide 500 mg daily  B2 400 mg daily   Coenzyme Q 10 300 mg daily   Some headaches have a migrainous component some are likely medication overuse   Polypharmacy potentates all her symptoms   I am not sure if she has TM and even if she has BIH as I do not see records of LP done at any point  I recommended LP for opening, closing  pressure and basic labs + removing 30-40 cc CSF may be therapeutic in case she truly suffers from pseudotumor cerebri.  Maxalt 2.5 mg prn max 10 per month   Medication overuse headaches are a huge problem for her and she will need to limit all OTC pain meds to < 15 tabs per month    RTC 3 months

## 2020-06-30 NOTE — ED NOTES
Report received from SONYA Cooper. Reviewed DC instructions with pt. Provided prescriptions X1. Pt verbalized understanding. Pt escorted via WC to lobby be transported home by family. VSS on room air. Pt A/Ox4 leaving unit. All questions and concerns addressed with pt prior to DC

## 2020-06-30 NOTE — ED NOTES
Patient denies emesis while in ED. Awaiting medication dispense from pharmacy for administration, patient updated on POC, no questions.

## 2020-07-01 ENCOUNTER — TELEMEDICINE (OUTPATIENT)
Dept: MEDICAL GROUP | Facility: MEDICAL CENTER | Age: 30
End: 2020-07-01
Payer: MEDICARE

## 2020-07-01 DIAGNOSIS — L08.9 FINGER INFECTION: ICD-10-CM

## 2020-07-01 PROCEDURE — 99213 OFFICE O/P EST LOW 20 MIN: CPT | Mod: 95,CR | Performed by: INTERNAL MEDICINE

## 2020-07-01 RX ORDER — DOXYCYCLINE HYCLATE 100 MG
100 TABLET ORAL 2 TIMES DAILY
Qty: 14 TAB | Refills: 0 | Status: SHIPPED | OUTPATIENT
Start: 2020-07-01 | End: 2020-07-08

## 2020-07-01 NOTE — PROGRESS NOTES
This encounter was conducted via Zoom meeting  Verbal consent was obtained. Patient's identity was verified.       CC: Finger redness and drainage.                                                                                                                                      HPI:   Kristin presents today with the following.    1. Finger infection  Presents complaining of 3 to 4 days of drainage of the middle finger on her left hand.  Redness along the lateral aspect with white drainage exuding.  Redness streaks to the first knuckle.  No fevers chills no other associated symptoms.      Patient Active Problem List    Diagnosis Date Noted   • Intracranial hypertension 02/27/2020     Priority: High   • Optic neuritis 05/27/2019     Priority: High   • Polypharmacy 06/27/2016     Priority: High   • Bilateral heel pain secondary to calcaneal bone spurs 03/28/2016     Priority: High   • Hypokalemia 09/29/2019     Priority: Medium   • Diabetes mellitus type 2 in obese ([facility]) 04/13/2017     Priority: Medium   • Morbidly obese ([facility]) 03/07/2016     Priority: Medium   • H/O prior ablation treatment 02/10/2016     Priority: Medium   • Immunocompromised ([facility]) 11/06/2019     Priority: Low   • History of atrial fibrillation and cardiomyopathy? 11/06/2019     Priority: Low   • Generalized weakness 09/30/2019     Priority: Low   • Hypothyroidism 08/04/2017     Priority: Low   • Depression 10/28/2016     Priority: Low   • Schizophrenia ([facility]) 10/27/2016     Priority: Low   • GERD (gastroesophageal reflux disease) 03/07/2016     Priority: Low   • Peripheral neuropathy and chornic pain syndrome ([facility]) 03/06/2016     Priority: Low   • Fatty liver disease, nonalcoholic 01/19/2015     Priority: Low   • Anxiety 12/16/2014     Priority: Low   • Knee pain, right 02/13/2014     Priority: Low   • Borderline personality disorder in adult ([facility]) 09/18/2010     Priority: Low   • Other fatigue 06/29/2020   • Type 2 diabetes mellitus without  complication, with long-term current use of insulin ([facility]) 05/11/2020   • History of supraventricular tachycardia 04/20/2020   • Schizoaffective disorder, depressive type ([facility]) 03/02/2020   • PTSD (post-traumatic stress disorder) 03/02/2020   • Stress incontinence 12/27/2019   • Mixed stress and urge urinary incontinence 12/27/2019   • Increased frequency of urination 12/27/2019   • History of optic neuritis 12/17/2019   • Mild intermittent asthma without complication 12/16/2019   • Closed head injury 11/30/2019   • Hypocalcemia 11/30/2019   • SVT (supraventricular tachycardia) ([facility]) 04/10/2019   • Functional diarrhea 01/05/2018   • Bowel and bladder incontinence 10/27/2016   • Galactorrhea 07/22/2016   • Scoliosis 03/07/2016       Current Outpatient Medications   Medication Sig Dispense Refill   • doxycycline (VIBRAMYCIN) 100 MG Tab Take 1 Tab by mouth 2 times a day for 7 days. 14 Tab 0   • magnesium oxide (MAG-OX) 400 MG Tab tablet Take 1 Tab by mouth every day. 90 Tab 3   • Riboflavin 400 MG Cap Take 1 Cap by mouth every day. 90 Cap 3   • Coenzyme Q10 300 MG Cap Take 1 Cap by mouth every day. 90 Cap 3   • topiramate (TOPAMAX) 25 MG Tab Take 1 Tab by mouth every evening. Take 1 tablet before sleep nightly for 7 days then increase to 2 tablets before bedtime after that 180 Tab 3   • Rizatriptan Benzoate (MAXALT-MLT) 5 MG TABLET DISPERSIBLE Take 1 Tab by mouth two times a week. Take 1 tablet at the onset of migraine can repeat up to 1 tab more in 24h no more than 10 tablets/mo 10 Tab 3   • prochlorperazine (COMPAZINE) 10 MG Tab Take 1 Tab by mouth every 6 hours as needed for Nausea/Vomiting (headache). 15 Tab 3   • OXcarbazepine (TRILEPTAL) 150 MG Tab TAKE ONE TABLET BY MOUTH TWICE DAILY 60 Tab 1   • cefdinir (OMNICEF) 300 MG Cap cefdinir 300 mg capsule     • fluconazole (DIFLUCAN) 150 MG tablet Diflucan 150 mg tablet   Take 1 tablet every day by oral route for 3 days.     • nitrofurantoin (MACRODANTIN) 50 MG Cap  nitrofurantoin macrocrystal 50 mg capsule   Take 1 capsule as needed by oral route.   Take 1 capsule following sexual intercourse     • nystatin (MYCOSTATIN) 705638 UNIT/GM Cream topical cream nystatin 100,000 unit/gram topical cream   APPLY TO THE AFFECTED AREA(S) BY TOPICAL ROUTE 2 TIMES PER DAY     • ondansetron (ZOFRAN ODT) 4 MG TABLET DISPERSIBLE Take 1 Tab by mouth every 6 hours as needed for Nausea. 60 Tab 0   • busPIRone (BUSPAR) 10 MG Tab tablet TAKE ONE TABLET BY MOUTH TWICE DAILY 60 Tab 0   • fluoxetine (PROZAC) 40 MG capsule TAKE ONE CAPSULE BY MOUTH ONCE A DAY 30 Cap 0   • ziprasidone (GEODON) 40 MG Cap TAKE ONE CAPSULE BY MOUTH TWICE DAILY 60 Cap 0   • acetaminophen (TYLENOL) 500 MG Tab Take 1 Tab by mouth 4 times a day as needed for Mild Pain, Moderate Pain or Fever (Mild Pain; (Pain scale 1-3); Temp greater than 100.5 F). 60 Tab 0   • predniSONE (DELTASONE) 20 MG Tab Take 1 Tab by mouth every day. (Patient taking differently: Take 15 mg by mouth every day.) 40 Tab 0   • acetaZOLAMIDE (DIAMOX) 250 MG Tab Take 1,000-1,500 mg by mouth 2 times a day. 1500 mg in the AM  1000 mg in the PM     • diphenhydrAMINE (BENADRYL) 12.5 MG/5ML Liquid liquid Take 25 mg by mouth 2 times daily with meals as needed. With the Doxycycline     • diclofenac EC (VOLTAREN) 50 MG Tablet Delayed Response Take 50 mg by mouth 3 times a day.     • insulin glargine (LANTUS SOLOSTAR) 100 UNIT/ML Solution Pen-injector injection Inject 15 Units as instructed every evening. 5 PEN 3   • lactulose 10 GM/15ML Solution Take 15 mL by mouth 2 times a day. 1 Bottle 2   • senna-docusate (PERICOLACE OR SENOKOT S) 8.6-50 MG Tab Take 2 Tabs by mouth 2 times a day.     • Ivabradine HCl (CORLANOR) 7.5 MG Tab Take 7.5 mg by mouth 2 Times a Day. Needs to schedule follow up with Dr. Wilde 180 Tab 1   • levothyroxine (SYNTHROID) 100 MCG Tab Take 1 Tab by mouth Every morning on an empty stomach. (Patient taking differently: Take 75 mcg by mouth  Every morning on an empty stomach.) 30 Tab 2   • vitamin D (VITAMIND D3) 1000 UNIT Tab Take 1 Tab by mouth every day. 60 Tab 0   • pregabalin (LYRICA) 300 MG capsule Take 300 mg by mouth 2 times a day.     • traZODone (DESYREL) 100 MG Tab Take 0.5 Tabs by mouth every evening. 30 Tab 3   • budesonide-formoterol (SYMBICORT) 160-4.5 MCG/ACT Aerosol Inhale 2 Puffs by mouth 2 Times a Day.     • aspirin EC (ECOTRIN) 81 MG Tablet Delayed Response Take 81 mg by mouth every day.     • ondansetron (ZOFRAN ODT) 4 MG TABLET DISPERSIBLE Take 4 mg by mouth every 6 hours as needed for Nausea.     • potassium chloride SA (KDUR) 20 MEQ Tab CR Take 20 mEq by mouth 2 times a day.     • tiotropium (SPIRIVA) 18 MCG Cap Inhale 1 Cap by mouth every day. 90 Cap 3   • ipratropium-albuterol (DUONEB) 0.5-2.5 (3) MG/3ML nebulizer solution 3 mL by Nebulization route every 6 hours as needed for Shortness of Breath. 60 Bullet 1   • etonogestrel (NEXPLANON) 68 MG Implant implant Inject 1 Each as instructed Once.     • mycophenolate (CELLCEPT) 500 MG tablet Take 1,000 mg by mouth 2 times a day.     • Dulaglutide (TRULICITY) 1.5 MG/0.5ML Solution Pen-injector Inject 1.5 mg as instructed every Friday.     • albuterol 108 (90 Base) MCG/ACT Aero Soln inhalation aerosol Inhale 2 Puffs by mouth every 6 hours as needed for Shortness of Breath.     • Melatonin 10 MG Tab Take 10 mg by mouth every evening.     • furosemide (LASIX) 80 MG Tab Take 80 mg by mouth 1 time daily as needed.       No current facility-administered medications for this visit.          Allergies as of 07/01/2020 - Reviewed 06/30/2020   Allergen Reaction Noted   • Depakote [divalproex sodium] Unspecified 06/14/2010   • Amitriptyline Unspecified 10/31/2013   • Aripiprazole [abilify] Unspecified 01/17/2013   • Clindamycin Nausea 02/02/2011   • Flagyl [metronidazole hcl] Unspecified 03/31/2011   • Flomax [tamsulosin hydrochloride] Swelling 09/24/2009   • Levaquin Unspecified 10/31/2013   •  Metformin Unspecified 07/23/2013   • Tape Rash 08/15/2012   • Vancomycin Itching 07/10/2016   • Wound dressing adhesive Hives 01/12/2018   • Ciprofloxacin  01/16/2020   • Hydromorphone hcl  01/16/2020   • Keflex Rash 01/01/2017   • Levofloxacin Unspecified 10/27/2016   • Penicillins  01/16/2020        ROS:  Denies, chest pain, Shortness of breath, Edema.     There were no vitals taken for this visit.      Physical Exam:  Constitutional: Alert, no distress, well-groomed.  Skin: No rashes in visible areas.  Eye: Round. Conjunctiva clear, lNo icterus.   ENMT: Lips pink without lesions, good dentition, moist mucous membranes. Phonation normal.  Neck: No masses, no thyromegaly. Moves freely without pain.  CV: Pulse as reported by patient  Respiratory: Unlabored respiratory effort, no cough or audible wheeze  Psych: Alert and oriented x3, normal affect and mood.          Assessment and Plan.   29 y.o. female with the following issues.    1. Finger infection  Does not seem consistent with possible infection have placed on antibiotics caution about side effects told to watch for fevers to go to ER if they should occur.

## 2020-07-05 RX ORDER — TRAZODONE HYDROCHLORIDE 100 MG/1
TABLET ORAL
Qty: 90 TAB | Refills: 1 | Status: SHIPPED
Start: 2020-07-05 | End: 2020-08-15

## 2020-07-06 NOTE — TELEPHONE ENCOUNTER
1. Caller Name: Self                 Call Back Number: cell  [facility] PCP or Specialty Provider: Yes  Brody        2.  In the last two weeks, has the patient had any new or worsening symptoms (not explained by alternative diagnosis)? Yes, the patient reports the following COVID-19 consistent symptoms: cough, chills, muscle pain or body aches, nausea, diarrhea and injure herself, dizzy and fell backwards.    3.  Does patient have any comoribidities? COPD, DM, ESRD and Immunosuppressed    4.  Has the patient traveled in the last 14 days OR had any known contact with someone who is suspected or confirmed to have COVID-19?  No.    5. Disposition: Offered patient virtual visit to limit potential exposure to others; patient also given self care instructions    Note routed to [facility] Provider: FYI only.     ----- Message from Pedrito Bowser sent at 7/6/2020 10:23 AM PDT -----  TRANSFERRED PATIENT TO THE NURSE TEAM DUE TO THESE SYMPTOMS:   COUGH / CHILLS / DIARRHEA / SHORTNESS OF BREATH / HEADACHES

## 2020-07-07 ENCOUNTER — HOSPITAL ENCOUNTER (EMERGENCY)
Facility: MEDICAL CENTER | Age: 30
End: 2020-07-07
Attending: EMERGENCY MEDICINE
Payer: MEDICARE

## 2020-07-07 ENCOUNTER — APPOINTMENT (OUTPATIENT)
Dept: RADIOLOGY | Facility: MEDICAL CENTER | Age: 30
End: 2020-07-07
Attending: EMERGENCY MEDICINE
Payer: MEDICARE

## 2020-07-07 VITALS
RESPIRATION RATE: 20 BRPM | SYSTOLIC BLOOD PRESSURE: 128 MMHG | DIASTOLIC BLOOD PRESSURE: 74 MMHG | BODY MASS INDEX: 42.65 KG/M2 | HEART RATE: 72 BPM | HEIGHT: 65 IN | TEMPERATURE: 97.1 F | OXYGEN SATURATION: 97 % | WEIGHT: 256 LBS

## 2020-07-07 VITALS
SYSTOLIC BLOOD PRESSURE: 138 MMHG | TEMPERATURE: 98.6 F | BODY MASS INDEX: 44.32 KG/M2 | HEART RATE: 62 BPM | HEIGHT: 65 IN | WEIGHT: 266 LBS | OXYGEN SATURATION: 95 % | DIASTOLIC BLOOD PRESSURE: 71 MMHG | RESPIRATION RATE: 21 BRPM
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
## 2020-07-07 DIAGNOSIS — R10.9 FLANK PAIN: ICD-10-CM

## 2020-07-07 DIAGNOSIS — T83.9XXA PROBLEM WITH FOLEY CATHETER, INITIAL ENCOUNTER (HCC): ICD-10-CM
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
## 2020-07-07 DIAGNOSIS — S30.1XXA CONTUSION OF ABDOMINAL WALL, INITIAL ENCOUNTER: ICD-10-CM

## 2020-07-07 LAB
AMORPH CRY #/AREA URNS HPF: PRESENT /HPF
ANION GAP SERPL CALC-SCNC: 13 MMOL/L (ref 7–16)
APPEARANCE UR: ABNORMAL
BACTERIA #/AREA URNS HPF: ABNORMAL /HPF
BASOPHILS # BLD AUTO: 0 % (ref 0–1.8)
BASOPHILS # BLD: 0 K/UL (ref 0–0.12)
BILIRUB UR QL STRIP.AUTO: NEGATIVE
BUN SERPL-MCNC: 10 MG/DL (ref 8–22)
CALCIUM SERPL-MCNC: 8.9 MG/DL (ref 8.5–10.5)
CHLORIDE SERPL-SCNC: 111 MMOL/L (ref 96–112)
CO2 SERPL-SCNC: 14 MMOL/L (ref 20–33)
COLOR UR: YELLOW
CREAT SERPL-MCNC: 0.74 MG/DL (ref 0.5–1.4)
EOSINOPHIL # BLD AUTO: 0 K/UL (ref 0–0.51)
EOSINOPHIL NFR BLD: 0 % (ref 0–6.9)
EPI CELLS #/AREA URNS HPF: ABNORMAL /HPF
ERYTHROCYTE [DISTWIDTH] IN BLOOD BY AUTOMATED COUNT: 46.3 FL (ref 35.9–50)
GLUCOSE SERPL-MCNC: 137 MG/DL (ref 65–99)
GLUCOSE UR STRIP.AUTO-MCNC: NEGATIVE MG/DL
HCT VFR BLD AUTO: 40.8 % (ref 37–47)
HGB BLD-MCNC: 12.7 G/DL (ref 12–16)
IMM GRANULOCYTES # BLD AUTO: 0.04 K/UL (ref 0–0.11)
IMM GRANULOCYTES NFR BLD AUTO: 0.8 % (ref 0–0.9)
KETONES UR STRIP.AUTO-MCNC: NEGATIVE MG/DL
LEUKOCYTE ESTERASE UR QL STRIP.AUTO: NEGATIVE
LYMPHOCYTES # BLD AUTO: 0.45 K/UL (ref 1–4.8)
LYMPHOCYTES NFR BLD: 9 % (ref 22–41)
MCH RBC QN AUTO: 29.7 PG (ref 27–33)
MCHC RBC AUTO-ENTMCNC: 31.1 G/DL (ref 33.6–35)
MCV RBC AUTO: 95.3 FL (ref 81.4–97.8)
MICRO URNS: ABNORMAL
MONOCYTES # BLD AUTO: 0.22 K/UL (ref 0–0.85)
MONOCYTES NFR BLD AUTO: 4.4 % (ref 0–13.4)
NEUTROPHILS # BLD AUTO: 4.29 K/UL (ref 2–7.15)
NEUTROPHILS NFR BLD: 85.8 % (ref 44–72)
NITRITE UR QL STRIP.AUTO: NEGATIVE
NRBC # BLD AUTO: 0 K/UL
NRBC BLD-RTO: 0 /100 WBC
PH UR STRIP.AUTO: 8.5 [PH] (ref 5–8)
PLATELET # BLD AUTO: 158 K/UL (ref 164–446)
PMV BLD AUTO: 11.9 FL (ref 9–12.9)
POTASSIUM SERPL-SCNC: 4.1 MMOL/L (ref 3.6–5.5)
PROT UR QL STRIP: NEGATIVE MG/DL
RBC # BLD AUTO: 4.28 M/UL (ref 4.2–5.4)
RBC # URNS HPF: ABNORMAL /HPF
RBC UR QL AUTO: ABNORMAL
SODIUM SERPL-SCNC: 138 MMOL/L (ref 135–145)
SP GR UR STRIP.AUTO: 1.01
UROBILINOGEN UR STRIP.AUTO-MCNC: 0.2 MG/DL
WBC # BLD AUTO: 5 K/UL (ref 4.8–10.8)
WBC #/AREA URNS HPF: ABNORMAL /HPF

## 2020-07-07 PROCEDURE — 81001 URINALYSIS AUTO W/SCOPE: CPT

## 2020-07-07 PROCEDURE — 99285 EMERGENCY DEPT VISIT HI MDM: CPT

## 2020-07-07 PROCEDURE — A9270 NON-COVERED ITEM OR SERVICE: HCPCS | Performed by: EMERGENCY MEDICINE

## 2020-07-07 PROCEDURE — 700111 HCHG RX REV CODE 636 W/ 250 OVERRIDE (IP): Performed by: EMERGENCY MEDICINE

## 2020-07-07 PROCEDURE — 303105 HCHG CATHETER EXTRA

## 2020-07-07 PROCEDURE — 96372 THER/PROPH/DIAG INJ SC/IM: CPT

## 2020-07-07 PROCEDURE — 80048 BASIC METABOLIC PNL TOTAL CA: CPT

## 2020-07-07 PROCEDURE — 76775 US EXAM ABDO BACK WALL LIM: CPT

## 2020-07-07 PROCEDURE — 700102 HCHG RX REV CODE 250 W/ 637 OVERRIDE(OP): Performed by: EMERGENCY MEDICINE

## 2020-07-07 PROCEDURE — 51702 INSERT TEMP BLADDER CATH: CPT

## 2020-07-07 PROCEDURE — 85025 COMPLETE CBC W/AUTO DIFF WBC: CPT

## 2020-07-07 RX ORDER — FLUCONAZOLE 100 MG/1
200 TABLET ORAL ONCE
Status: COMPLETED | OUTPATIENT
Start: 2020-07-07 | End: 2020-07-07

## 2020-07-07 RX ORDER — NAPROXEN 500 MG/1
500 TABLET ORAL 2 TIMES DAILY WITH MEALS
Qty: 20 TAB | Refills: 0 | Status: SHIPPED | OUTPATIENT
Start: 2020-07-07 | End: 2020-07-10

## 2020-07-07 RX ORDER — DEXAMETHASONE SODIUM PHOSPHATE 10 MG/ML
10 INJECTION, SOLUTION INTRAMUSCULAR; INTRAVENOUS ONCE
Status: COMPLETED | OUTPATIENT
Start: 2020-07-07 | End: 2020-07-07

## 2020-07-07 RX ORDER — MORPHINE SULFATE 10 MG/ML
8 INJECTION, SOLUTION INTRAMUSCULAR; INTRAVENOUS ONCE
Status: COMPLETED | OUTPATIENT
Start: 2020-07-07 | End: 2020-07-07

## 2020-07-07 RX ORDER — ALBUTEROL SULFATE 90 UG/1
2 AEROSOL, METERED RESPIRATORY (INHALATION) ONCE
Status: COMPLETED | OUTPATIENT
Start: 2020-07-07 | End: 2020-07-07

## 2020-07-07 RX ORDER — KETOROLAC TROMETHAMINE 30 MG/ML
15 INJECTION, SOLUTION INTRAMUSCULAR; INTRAVENOUS ONCE
Status: COMPLETED | OUTPATIENT
Start: 2020-07-07 | End: 2020-07-07

## 2020-07-07 RX ORDER — ONDANSETRON 4 MG/1
4 TABLET, ORALLY DISINTEGRATING ORAL ONCE
Status: COMPLETED | OUTPATIENT
Start: 2020-07-07 | End: 2020-07-07

## 2020-07-07 RX ADMIN — FLUCONAZOLE 200 MG: 100 TABLET ORAL at 02:39

## 2020-07-07 RX ADMIN — DEXAMETHASONE SODIUM PHOSPHATE 10 MG: 10 INJECTION INTRAMUSCULAR; INTRAVENOUS at 03:25

## 2020-07-07 RX ADMIN — ALBUTEROL SULFATE 2 PUFF: 90 AEROSOL, METERED RESPIRATORY (INHALATION) at 02:38

## 2020-07-07 RX ADMIN — KETOROLAC TROMETHAMINE 15 MG: 30 INJECTION, SOLUTION INTRAMUSCULAR at 22:40

## 2020-07-07 RX ADMIN — ONDANSETRON 4 MG: 4 TABLET, ORALLY DISINTEGRATING ORAL at 19:52

## 2020-07-07 RX ADMIN — MORPHINE SULFATE 8 MG: 10 INJECTION INTRAVENOUS at 19:52

## 2020-07-07 ASSESSMENT — FIBROSIS 4 INDEX
FIB4 SCORE: 0.38
FIB4 SCORE: 0.38

## 2020-07-07 NOTE — ED NOTES
Dr. Salcedo in to follow up with patient due to auditory wheezing. MD auscultated patient lungs with clear findings. Clear for discharge.

## 2020-07-07 NOTE — ED PROVIDER NOTES
ED Provider Note        Primary care provider: Torres Brody M.D.    I verified that the patient was wearing a mask and I was wearing appropriate PPE every time I entered the room. The patient's mask was on the patient at all times during my encounter except for a brief view of the oropharynx.      CHIEF COMPLAINT  Chief Complaint   Patient presents with   • Urinary Catheter Problem     PT reports jara cath fell out and balloon filled and intact.       HPI  Kristin Balderrama is a 29 y.o. female who presents to the Emergency Department with chief complaint of urinary catheter problem.  Patient has a history of urinary retention indwelling Jara catheter for approximately 1 month.  She follows with urologist Dr. Rosenbaum.  She had her eat Jara catheter pulled out with the balloon up earlier today's that it was replaced and then at approximately 11 PM last night her Jara reportedly just fell out.  She states she feels as though her bladder pushed it out.  She is had no fevers no chills no abdominal pain no problems with bowel movements no abnormal vaginal bleeding or discharge.  Is currently having no pain she is just finishing antibiotics for recent urinary tract infection states that she has 2 days left.  No other acute symptoms or concerns this time.    REVIEW OF SYSTEMS  10 systems reviewed and otherwise negative, pertinent positives and negatives listed in the history of present illness.    PAST MEDICAL HISTORY   has a past medical history of Abdominal pain, Anginal syndrome, Apnea, sleep, Arrhythmia, Arthritis, ASTHMA, Atrial fibrillation (HCC), Back pain, Borderline personality disorder (HCC), Breath shortness, Bronchitis, Cardiac arrhythmia, Chickenpox, Chronic UTI (9/18/2010), Cough, Daytime sleepiness, Depression, Diabetes (HCC), Diarrhea, Disorder of thyroid, Fall, Fatigue, Frequent headaches, Gasping for breath, Gynecological disorder, Headache(784.0), Heart burn, History of falling, Hypertension,  "Indigestion, Migraine, 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HCC), 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HCC), Nausea, Obesity, Other fatigue (6/29/2020), Pain (08-15-12), Painful joint, PCOS (polycystic ovarian syndrome), Pneumonia (2012), Psychosis ([facility]), Renal disorder, Ringing in ears, Scoliosis, Shortness of breath, Sinus tachycardia (10/31/2013), Sleep apnea, Snoring, Tonsillitis, Tuberculosis, Urinary bladder disorder, Urinary incontinence, Weakness, and Wears glasses.    SURGICAL HISTORY   has a past surgical history that includes neuro dest facet l/s w/ig sngl (4/21/2015); recovery (1/27/2016); delmar by laparoscopy (8/29/2010); lumbar fusion anterior (8/21/2012); other cardiac surgery (1/2016); tonsillectomy; bowel stimulator insertion (7/13/2016); gastroscopy with balloon dilatation (N/A, 1/4/2017); muscle biopsy (Right, 1/26/2017); other abdominal surgery; and laminotomy.    SOCIAL HISTORY  Social History     Tobacco Use   • Smoking status: Never Smoker   • Smokeless tobacco: Never Used   Substance Use Topics   • Alcohol use: No     Alcohol/week: 0.0 oz   • Drug use: Not Currently     Frequency: 7.0 times per week     Types: Marijuana, Oral     Comment: Medicinal edible's      Social History     Substance and Sexual Activity   Drug Use Not Currently   • Frequency: 7.0 times per week   • Types: Marijuana, Oral    Comment: Medicinal edible's       FAMILY HISTORY  Non-Contributory    CURRENT MEDICATIONS  Home Medications    **Home medications have not yet been reviewed for this encounter**         ALLERGIES  Allergies   Allergen Reactions   • Depakote [Divalproex Sodium] Unspecified     Muscle spasms/muscle pain and weakness     • Amitriptyline Unspecified     Headaches     • Aripiprazole [Abilify] Unspecified     Headaches/muscle twitching     • Clindamycin Nausea     Even with food     • Flagyl [Metronidazole Hcl] Unspecified     \"eye problems\"     • Flomax [Tamsulosin Hydrochloride] Swelling   • Levaquin Unspecified     Severe " "muscle cramps in legs  RXN=unknown   • Metformin Unspecified     Increased lactic acid      • Tape Rash     Tears skin off  coban with Tegaderm tape ok intermittently  RXN=ongoing   • Vancomycin Itching     Pt becomes flushed in face and chest.   RXN=7/10/16   • Wound Dressing Adhesive Hives     By pt report   • Ciprofloxacin    • Hydromorphone Hcl      Pt states she feels loopy   • Keflex Rash     Pt states she gets a rash with this medication  Tolerates ceftriaxone   • Levofloxacin Unspecified     Leg muscle cramps   • Penicillins        PHYSICAL EXAM  VITAL SIGNS: /79   Pulse 90   Temp 36.6 °C (97.9 °F) (Temporal)   Resp 20   Ht 1.651 m (5' 5\")   Wt 116.1 kg (256 lb)   SpO2 96%   BMI 42.60 kg/m²   Pulse ox interpretation: I interpret this pulse ox as normal.  Constitutional: Alert and oriented x 3, minimal distress  HEENT: Atraumatic normocephalic, pupils are equal round reactive to light extraocular movements are intact. The nares is clear, external ears are normal, mouth shows moist mucous membranes  Neck: Supple, no JVD no tracheal deviation  Cardiovascular: Regular rate and rhythm no murmur rub or gallop 2+ pulses peripherally x4  Thorax & Lungs: No respiratory distress, no wheezes rales or rhonchi, No chest tenderness.   GI: Soft nontender nondistended positive bowel sounds, no peritoneal signs  : Large amount of thick white discharge emanating from the vagina and in the labial folds.  There is no abnormality of the urethral meatus Adnrade catheter placed by ERP during examination  Skin: Warm dry no acute rash or lesion  Musculoskeletal: No acute deformities, baseline range and strength in all major joints decrease in lower extremities due to his history of transverse myelitis  Neurologic: Cranial nerves III through XII are grossly intact, no sensory deficit, no cerebellar dysfunction   Psychiatric: Appropriate affect for situation at this time      DIAGNOSTIC STUDIES / " "PROCEDURES  LABS              COURSE & MEDICAL DECISION MAKING  Pertinent Labs & Imaging studies reviewed. (See chart for details)    2:10 AM - Patient seen and examined at bedside.         Patient noted to have slightly elevated blood pressure likely circumstantial secondary to presenting complaint. Referred to primary care physician for further evaluation.          Medical Decision Makin-year-old female presents with Andrade catheter dislodgment x2 today.  Questionable urinary retention seemed unlikely that she would have urinary retention and a Andrade catheter that could come out of the urethra of the bulb intact.  She was examined as above there is no evidence of any urethral injury or other abnormality she had obvious yeast infection on examination was given 1 dose of Diflucan.  Patient has a history of asthma had some audible wheezing shortly after Andrade placement.  She was given 1 dose of Decadron and albuterol inhaler resolution of the wheezing patient understands return for any worsening symptoms or concerns fully displacement no urine collecting the back fevers chills nausea vomiting respiratory distress any other acute symptoms or concerns she will otherwise follow-up with urology and primary care discharged in stable and improved condition.  /79   Pulse 90   Temp 36.6 °C (97.9 °F) (Temporal)   Resp 20   Ht 1.651 m (5' 5\")   Wt 116.1 kg (256 lb)   SpO2 96%   BMI 42.60 kg/m²     Rolf Rosenbaum M.D.  [address] [facility] [id number]  [phone]    Schedule an appointment as soon as possible for a visit       [facility], Emergency Dept  [address] [facility] [id number] [phone]    immediately if symptoms worsen      Discharge Medication List as of 2020  3:14 AM          FINAL IMPRESSION  1. Problem with Andrade catheter, initial encounter (HCC)     2.  Vulvovaginal candidiasis  3.  Acute bronchospasm      This dictation has been created using voice " recognition software and/or scribes. The accuracy of the dictation is limited by the abilities of the software and the expertise of the scribes. I expect there may be some errors of grammar and possibly content. I made every attempt to manually correct the errors within my dictation. However, errors related to voice recognition software and/or scribes may still exist and should be interpreted within the appropriate context.

## 2020-07-07 NOTE — ED TRIAGE NOTES
"Chief Complaint   Patient presents with   • Urinary Catheter Problem     PT reports jara cath fell out and balloon filled and intact.     Blood Pressure 122/79   Pulse 90   Temperature 36.6 °C (97.9 °F) (Temporal)   Respiration 20   Height 1.651 m (5' 5\")   Weight 116.1 kg (256 lb)   Oxygen Saturation 96%   Body Mass Index 42.60 kg/m²     "

## 2020-07-07 NOTE — ED NOTES
Pt brought to room by wheelchair. Pt states she had had jara catheter in for one month due to urinary retention. Pt states jara cath fell out earlier today and was replaced. Pt states it fell out again around 2300 tonight. Pt also reports recent fall with some bruising noted to back.

## 2020-07-07 NOTE — ED NOTES
Pt AOx4, skin warm and dry, airway patent, rr even and unlabored, audible whistling noted but no respiratory distress at this time. Lungs clear with oxygen sat 97%. Pt assisted to wheelchair without new incident or distress upon discharge.

## 2020-07-08 NOTE — ED PROVIDER NOTES
ED Provider Note    Scribed for Rahul Segura M.D. by Laron Chandler. 7/7/2020  7:11 PM    CHIEF COMPLAINT  Chief Complaint   Patient presents with   • Back Pain   • Abdominal Pain       HPI  Kristin Balderrama is a 29 y.o. female who presents with complaints of moderate lower back pain. She reports that she fell down three days ago landing on her back. She states there is some swelling on her right flank. There is radiation of her pain to her abdominal area, and there is bruising along her lower abdomen. She reports a history of chronic back pain, but her pain today is different than her prior symptoms. She has not had any improvement of her pain with ice application. She also reports a history of a lumbar fusion. She states she has kidney stones in the past with similar symptoms in the past.     Review of past medical records shows the patient has a complex medical history including prior lumbar surgeries, arhythmia, chronic abdominal pain, back pain, psychiatric Illness and possible urinary retention for which she had a jara paced recently. Patient was seen earlier today in the ED for  jara catheter dysfunction, and she was diagnosed with reactive airway disease and a yeast infection for which she received symptomatic treatment. She is established with Dr. Rosenbamu, Urology. Patient has had multiple radiology scans done includng CT of the head, face, and hip along with MRI's. Last CT of the back was in February 2020 showing post surgical changes of the anterior fusion of :L4-L5 but otherwise unremarkable.     PPE Note: I personally donned full PPE for all patient encounters during this visit, including being clean-shaven with an N95 respirator mask, gloves, and eye protection. Scribe remained outside the patient's room and did not have any contact with the patient for the duration of patient encounter.        REVIEW OF  SYSTEMS  Constitutional: No fevers, chills, or recent illness.  Skin: No rashes or diaphoresis.  Eyes: No change in vision, no discharge.  ENT: No hearing change. No rhinorrhea or nasal congestion, no ST or difficulty swallowing.  Respiratory: No SOB. No coughing or hemoptysis. No Wheezing.  Cardiac: No CP, palpitations, edema. No PND or orthopnea.  GI: Suprapubic abdominal Pain; N/V; diarrhea, constipation. No blood in stool.  : No dysuria. No D/C. No frequency or urgency. No hesitancy.   MSK: Lower back pain.  Neuro: No HA.  Psych: No SI, HI, AH, VH.  Endocrine: No polyuria or polydipsia. No heat or cold intolerance.  Heme: No easy bruising. No history of bleeding disorders or anemia.    PAST MEDICAL HISTORY   has a past medical history of Abdominal pain, Anginal syndrome, Apnea, sleep, Arrhythmia, Arthritis, ASTHMA, Atrial fibrillation ([facility]), Back pain, Borderline personality disorder ([facility]), Breath shortness, Bronchitis, Cardiac arrhythmia, Chickenpox, Chronic UTI (9/18/2010), Cough, Daytime sleepiness, Depression, Diabetes ([facility]), Diarrhea, Disorder of thyroid, Fall, Fatigue, Frequent headaches, Gasping for breath, Gynecological disorder, Headache(784.0), Heart burn, History of falling, Hypertension, Indigestion, Migraine, 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    SOCIAL HISTORY  Social History     Tobacco Use   • Smoking status: Never Smoker   • Smokeless tobacco: Never Used   Substance and Sexual Activity   • Alcohol use: No     Alcohol/week: 0.0 oz   • Drug use: Not Currently     Frequency: 7.0 times per week      Types: Marijuana, Oral     Comment: Medicinal edible's   • Sexual activity: Not Currently     Birth control/protection: Pill       SURGICAL HISTORY   has a past surgical history that includes neuro dest facet l/s w/ig sngl (4/21/2015); recovery (1/27/2016); [place] by laparoscopy (8/29/2010); lumbar fusion anterior (8/21/2012); other cardiac surgery (1/2016); tonsillectomy; bowel stimulator insertion (7/13/2016); gastroscopy with balloon dilatation (N/A, 1/4/2017); muscle biopsy (Right, 1/26/2017); other abdominal surgery; and laminotomy.    CURRENT MEDICATIONS  Home Medications     Reviewed by Brtit Brody R.N. (Registered Nurse) on 07/07/20 at 1848  Med List Status: Unable to Obtain   Medication Last Dose Status   acetaminophen (TYLENOL) 500 MG Tab  Active   acetaZOLAMIDE (DIAMOX) 250 MG Tab  Active   albuterol 108 (90 Base) MCG/ACT Aero Soln inhalation aerosol  Active   aspirin EC (ECOTRIN) 81 MG Tablet Delayed Response  Active   budesonide-formoterol (SYMBICORT) 160-4.5 MCG/ACT Aerosol  Active   busPIRone (BUSPAR) 10 MG Tab tablet  Active   cefdinir (OMNICEF) 300 MG Cap  Active   Coenzyme Q10 300 MG Cap  Active   diclofenac EC (VOLTAREN) 50 MG Tablet Delayed Response  Active   diphenhydrAMINE (BENADRYL) 12.5 MG/5ML Liquid liquid  Active   doxycycline (VIBRAMYCIN) 100 MG Tab  Active   Dulaglutide (TRULICITY) 1.5 MG/0.5ML Solution Pen-injector  Active   etonogestrel (NEXPLANON) 68 MG Implant implant  Active   fluconazole (DIFLUCAN) 150 MG tablet  Active   fluoxetine (PROZAC) 40 MG capsule  Active   furosemide (LASIX) 80 MG Tab  Active   insulin glargine (LANTUS SOLOSTAR) 100 UNIT/ML Solution Pen-injector injection  Active   ipratropium-albuterol (DUONEB) 0.5-2.5 (3) MG/3ML nebulizer solution  Active   Ivabradine HCl (CORLANOR) 7.5 MG Tab  Active   lactulose 10 GM/15ML Solution  Active   levothyroxine (SYNTHROID) 100 MCG Tab  Active   magnesium oxide (MAG-OX) 400 MG Tab tablet  Active   Melatonin 10 MG Tab   "Active   mycophenolate (CELLCEPT) 500 MG tablet  Active   nitrofurantoin (MACRODANTIN) 50 MG Cap  Active   nystatin (MYCOSTATIN) 366854 UNIT/GM Cream topical cream  Active   ondansetron (ZOFRAN ODT) 4 MG TABLET DISPERSIBLE  Active   ondansetron (ZOFRAN ODT) 4 MG TABLET DISPERSIBLE  Active   OXcarbazepine (TRILEPTAL) 150 MG Tab  Active   potassium chloride SA (KDUR) 20 MEQ Tab CR  Active   predniSONE (DELTASONE) 20 MG Tab  Active   pregabalin (LYRICA) 300 MG capsule  Active   prochlorperazine (COMPAZINE) 10 MG Tab  Active   Riboflavin 400 MG Cap  Active   Rizatriptan Benzoate (MAXALT-MLT) 5 MG TABLET DISPERSIBLE  Active   senna-docusate (PERICOLACE OR SENOKOT S) 8.6-50 MG Tab  Active   tiotropium (SPIRIVA) 18 MCG Cap  Active   topiramate (TOPAMAX) 25 MG Tab  Active   traZODone (DESYREL) 100 MG Tab  Active   vitamin D (VITAMIND D3) 1000 UNIT Tab  Active   ziprasidone (GEODON) 40 MG Cap  Active              ALLERGIES  Allergies   Allergen Reactions   • Depakote [Divalproex Sodium] Unspecified     Muscle spasms/muscle pain and weakness     • Amitriptyline Unspecified     Headaches     • Aripiprazole [Abilify] Unspecified     Headaches/muscle twitching     • Clindamycin Nausea     Even with food     • Flagyl [Metronidazole Hcl] Unspecified     \"eye problems\"     • Flomax [Tamsulosin Hydrochloride] Swelling   • Levaquin Unspecified     Severe muscle cramps in legs  RXN=unknown   • Metformin Unspecified     Increased lactic acid      • Tape Rash     Tears skin off  coban with Tegaderm tape ok intermittently  RXN=ongoing   • Vancomycin Itching     Pt becomes flushed in face and chest.   RXN=7/10/16   • Wound Dressing Adhesive Hives     By pt report   • Ciprofloxacin    • Hydromorphone Hcl      Pt states she feels loopy   • Keflex Rash     Pt states she gets a rash with this medication  Tolerates ceftriaxone   • Levofloxacin Unspecified     Leg muscle cramps   • Penicillins      PHYSICAL EXAM  VITAL SIGNS: /79   Pulse " "69   Temp 37 °C (98.6 °F) (Temporal)   Resp 12   Ht 1.651 m (5' 5\")   Wt 120.7 kg (266 lb)   SpO2 96%   BMI 44.26 kg/m²    Pulse ox interpretation: I interpret this pulse ox as normal.  Genl: Female sitting in chair comfortably, chronically ill-appearing but nontoxic. Mild distress.  Head: NC/AT   ENT: Mucous membranes moist, posterior pharynx clear, uvula midline, nares patent bilaterally   Eyes: Normal sclera, pupils equal round reactive to light  Pulmonary: Lungs are clear to auscultation bilaterally  CV:  RRR, no murmur appreciated, pulses 2+ in both upper and lower extremities,  Abdomen: Morbidly obese, soft, suprapubic discomfort, no rebound/guarding, Negative Coon's sign.  : flank discomfort but no true CVA tenderness. Urinary catheter in place, clear return.  Musculoskeletal: Midline incision scar in lower lumbar region, no midline tenderness, no step-off. Diffuse soft tissue tenderness and occasional scatter ecchymosis radiating to right flank towards the right groin  Neuro: A&Ox4 (person, place, time, situation), speech fluent, spontaneous movement of all extremities, no acute sensory deficits. Unable to walk due to habitus.   Psych: Patient has an appropriate affect and behavior  Skin: No rash or lesions.  No pallor or jaundice.  No cyanosis.  Warm and dry.     DIAGNOSTIC STUDIES / PROCEDURES    LABS  Labs Reviewed   CBC WITH DIFFERENTIAL - Abnormal; Notable for the following components:       Result Value    MCHC 31.1 (*)     Platelet Count 158 (*)     Neutrophils-Polys 85.80 (*)     Lymphocytes 9.00 (*)     Lymphs (Absolute) 0.45 (*)     All other components within normal limits   BASIC METABOLIC PANEL - Abnormal; Notable for the following components:    Co2 14 (*)     Glucose 137 (*)     All other components within normal limits   URINALYSIS,CULTURE IF INDICATED - Abnormal; Notable for the following components:    Character Cloudy (*)     Ph 8.5 (*)     Occult Blood Small (*)     All other " components within normal limits   URINE MICROSCOPIC (W/UA) - Abnormal; Notable for the following components:    RBC 2-5 (*)     Bacteria Few (*)     All other components within normal limits   ESTIMATED GFR     RADIOLOGY  US-RENAL   Final Result      No hydronephrosis.        COURSE & MEDICAL DECISION MAKING  Pertinent Labs & Imaging studies reviewed. (See chart for details)    Differential diagnoses include but not limited to:  Hematoma  Kidney stone  Kidney injury  Muscular strain  Solid viscous injury unlikely     7:11 PM - Patient seen and examined at bedside. Patient will be treated with morphine 8 mg and Zofran 4 mg. Ordered CBC w/ diff, BMP, UA w/ culture if indicated to evaluate her symptoms.     8:58 PM - Patient was reevaluated at bedside and informed of the presence of blood in her urine. I recommended obtaining a renal ultrasound which she would like to proceed with.     11:01 PM - Patient was reevaluated at bedside. Discussed lab and radiology results with the patient. I advised that she will likely continue to be sore from her fall, and she should treat this with ice and rest.  I have also provided her with a prescription for Naprosyn to take in addition to her chronic pain regimen.    Medical Decision Making:   Patient presents emergency room for symptoms as described above.  The patient has a complex medical history that is reviewed.  She is not currently having any focal neurological deficits, she is moving her lower extremities spontaneously and with logrolled she has no evidence of step-offs or point tenderness along the prior surgical incision site but she has profound tenderness along areas of ecchymosis on the right flank over the areas where she had a ground-level fall several days ago.  In the interim time since her fall she has been seen in the emergency department and had a assessment for dislodged urinary catheter.  At this time she is having difficulty controlling her pain though she has  reassuring vital signs.  Review of her previous imaging as the patient has had a profound amount of CTs and MRIs showed very small amount of punctate stones without any signs of previous obstruction.  Urinalysis showed very minor amounts of blood, unlikely to be consistent with a acute nephrolithiasis.  With this low risk however the presence of blood a ultrasound of the right side was obtained that showed no evidence of hydronephrosis or other evidence of kidney structural changes.  Bladder is also confirmed to have the Andrade balloon in place.  There is some irritation and bacteria present in the urine however with a Andrade catheter in place a believe this should be sent for culture and should not be emergently treated as she is not having systemic symptoms.  The patient remains acutely stable, has no evidence of evolving focal neurological deficits, sensory changes or other interval events.  She is counseled regarding these findings, understands that she has clinical signs of likely hematoma on the right flank soft tissues and is counseled about rest, ice and heat packs in addition to short courses of anti-inflammatory medications.    HTN/IDDM FOLLOW UP:  The patient is referred to a primary physician for blood pressure management, diabetic screening, and for all other preventive health concerns    The patient will return for worsening symptoms and is stable at the time of discharge. The patient verbalizes understanding and will comply.     DISPOSITION:  Patient will be discharged home in stable condition.    FOLLOW UP:  Torres Brody M.D.  [address] [place] [id number] [phone]    Schedule an appointment as soon as possible for a visit       [facility], Emergency Dept  [address] [facility] [address] [phone]    If symptoms worsen    OUTPATIENT MEDICATIONS:  New Prescriptions    NAPROXEN (NAPROSYN) 500 MG TAB    Take 1 Tab by mouth 2 times a day, with meals  for 10 days.      FINAL IMPRESSION  Visit Diagnoses     ICD-10-CM   1. Contusion of abdominal wall, initial encounter  S30.1XXA   2. Flank pain  R10.9   3. Fall in home, initial encounter  W19.XXXA    Y92.009     Laron RODRIGUEZ (Scribe), am scribing for, and in the presence of, Rahul Segura M.D..    Electronically signed by: Laron Chandler (Scribe), 7/7/2020    IRahul M.D. personally performed the services described in this documentation, as scribed by Laron Chandler in my presence, and it is both accurate and complete.    The note accurately reflects work and decisions made by me.  Rahul Segura M.D.  7/7/2020  11:11 PM

## 2020-07-08 NOTE — ED NOTES
"Pt BIB [facility] from home. Pt reports worsening lower back pain and R side abdominal pain s/p fall on Saturday. Pt has been seen for symptoms at [facility] with \"severe disc degeneration to lumbar back\". Patient reports main complaint is lumbar back pain. Pt reports she has been unable to ambulate d/t pain. Pt arrives with jara catheter in place that was replaced this AM in our ED for urinary retention problems. Pt also reports having COVID testing done at outside facility today. Patient has multiple medical problems and takes multiple medications daily. Pt A&OX4, RR even and unlabored.  "

## 2020-07-08 NOTE — ED NOTES
RN ensured pt had adequate transport home. D/c instructions and prescriptions reviewed. Pt communicated understanding

## 2020-07-10 ENCOUNTER — OFFICE VISIT (OUTPATIENT)
Dept: CARDIOLOGY | Facility: MEDICAL CENTER | Age: 30
End: 2020-07-10
Payer: MEDICARE

## 2020-07-10 VITALS
WEIGHT: 260 LBS | HEART RATE: 70 BPM | DIASTOLIC BLOOD PRESSURE: 80 MMHG | RESPIRATION RATE: 14 BRPM | SYSTOLIC BLOOD PRESSURE: 104 MMHG | BODY MASS INDEX: 43.32 KG/M2 | OXYGEN SATURATION: 95 % | HEIGHT: 65 IN

## 2020-07-10 DIAGNOSIS — Z86.79 HISTORY OF ATRIAL FIBRILLATION: ICD-10-CM

## 2020-07-10 DIAGNOSIS — Z98.890 H/O PRIOR ABLATION TREATMENT: ICD-10-CM

## 2020-07-10 DIAGNOSIS — Z86.79 HISTORY OF SUPRAVENTRICULAR TACHYCARDIA: ICD-10-CM

## 2020-07-10 PROCEDURE — 99214 OFFICE O/P EST MOD 30 MIN: CPT | Performed by: NURSE PRACTITIONER

## 2020-07-10 RX ORDER — SODIUM BICARBONATE 650 MG/1
325 TABLET ORAL 2 TIMES DAILY
COMMUNITY
End: 2020-12-22

## 2020-07-10 RX ORDER — METHOCARBAMOL 750 MG/1
1500 TABLET, FILM COATED ORAL 2 TIMES DAILY
Status: ON HOLD | COMMUNITY
End: 2020-10-12

## 2020-07-10 RX ORDER — FLUOXETINE HYDROCHLORIDE 40 MG/1
CAPSULE ORAL
Qty: 30 CAP | Refills: 0 | Status: SHIPPED
Start: 2020-07-10 | End: 2020-08-15

## 2020-07-10 RX ORDER — MIRABEGRON 50 MG/1
50 TABLET, FILM COATED, EXTENDED RELEASE ORAL DAILY
COMMUNITY
End: 2020-12-22 | Stop reason: SDUPTHER

## 2020-07-10 RX ORDER — GABAPENTIN 300 MG/1
300 CAPSULE ORAL EVERY EVENING
COMMUNITY
End: 2021-01-11

## 2020-07-10 ASSESSMENT — ENCOUNTER SYMPTOMS
PND: 0
NAUSEA: 1
SHORTNESS OF BREATH: 1
SPUTUM PRODUCTION: 0
COUGH: 0
WHEEZING: 1
HEMOPTYSIS: 0
HEADACHES: 0
VOMITING: 1
CLAUDICATION: 0
DIZZINESS: 0
ORTHOPNEA: 0
CHILLS: 1
FEVER: 0
PALPITATIONS: 1

## 2020-07-10 ASSESSMENT — FIBROSIS 4 INDEX: FIB4 SCORE: 0.36

## 2020-07-10 NOTE — PROGRESS NOTES
"Chief Complaint   Patient presents with   • Supraventricular Tachycardia (SVT)     PP of EA/SW       Subjective:   Kristin Balderrama is a 29 y.o. female who presents today for sinus tachycardia, ablation 2016 with post ablation PAF on chronic Corlonar therapy.  Patient is followed by Dr. Leon.    7/10/2020: Patient is here today for heart rate falling into the low 50s, she will then drink caffeine and end up in the 70s.  She is concerned about her heart rate \"shooting up\" to 100.  She feels as though it is like a \"bungee jump.\"  She is compliant with ASA 81 mg and ivabradine 7.5 mg twice daily.      Since 10/1/2018 the patient was seen in 1/2019 for elevated heart rate and blood pressure which spontaneously improved.  Seen by Dr. Abhishek Andino, [facility] in the ER and had her increase Ivabradine 7.5 mg twice daily which she tolerates.     Since 2/23/2018 appointment the patient said no cardiac symptoms.  Feels that Corlonar therapy has been quite effective.  On chronic oxygen therapy.    Past Medical History:   Diagnosis Date   • Abdominal pain    • Anginal syndrome     random chest pain especially with tachycardia   • Apnea, sleep    • Arrhythmia     \"sinus tachycardia\", cariologist, Dr. Kumar; ablation 2/2016   • Arthritis     osteo   • ASTHMA     when around smoke   • Atrial fibrillation (HCC)    • Back pain    • Borderline personality disorder (HCC)    • Breath shortness     with tachycardia   • Bronchitis    • Cardiac arrhythmia    • Chickenpox    • Chronic UTI 9/18/2010   • Cough    • Daytime sleepiness    • Depression    • Diabetes (HCC)    • Diarrhea    • Disorder of thyroid    • Fall    • Fatigue    • Frequent headaches    • Gasping for breath    • Gynecological disorder     PCOS   • Headache(784.0)    • Heart burn    • History of falling    • Hypertension    • Indigestion    • Migraine    • 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HCC)    • 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HCC)    • Nausea    • Obesity  " "  • Other fatigue 6/29/2020   • Pain 08-15-12    back, 7/10   • Painful joint    • PCOS (polycystic ovarian syndrome)    • Pneumonia 2012   • Psychosis (HCC)    • Renal disorder     \"kidney disease, stage 1\" nephrologist, Dr. 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   • Urinary bladder disorder     Suprapubic cath   • Urinary incontinence    • Weakness    • Wears glasses      Past Surgical History:   Procedure Laterality Date   • MUSCLE BIOPSY Right 1/26/2017    Procedure: MUSCLE BIOPSY - THIGH;  Surgeon: Isidro Vigil M.D.;  Location: [facility];  Service:    • GASTROSCOPY WITH BALLOON DILATATION N/A 1/4/2017    Procedure: GASTROSCOPY WITH DILATATION;  Surgeon: Torres Vargas M.D.;  Location: [facility];  Service:    • BOWEL STIMULATOR INSERTION  7/13/2016    Procedure: BOWEL STIMULATOR INSERTION FOR PERMANENT INTERSTIM SACRAL IMPLANT;  Surgeon: Joe Noyola M.D.;  Location: [facility];  Service:    • RECOVERY  1/27/2016    Procedure: CATH LAB EP STUDY WITH SINUS NODE MODIFICATION ABHINAV;  Surgeon: Recoveryonly Surgery;  Location: [facility] [place];  Service:    • OTHER CARDIAC SURGERY  1/2016    cardiac ablation   • NEURO DEST FACET L/S W/IG SNGL  4/21/2015    Performed by Reza Tabor at [facility]   • LUMBAR FUSION ANTERIOR  8/21/2012    Performed by SUSIE SAWANT at [facility]   • ALYSSA BY LAPAROSCOPY  8/29/2010    Performed by SHAYY JOHNS at [facility]   • LAMINOTOMY     • OTHER ABDOMINAL SURGERY     • TONSILLECTOMY      tonsillectomy     Family History   Problem Relation Age of Onset   • Hypertension Mother    • Sleep Apnea Mother    • Heart Disease Mother    • Other Mother         " hypothryod   • Hypertension Maternal Uncle    • Heart Disease Maternal Grandmother    • Hypertension Maternal Grandmother    • No Known Problems Sister    • Other Sister         Narcolepsy;fibromyalsia;bone;nerve   • Genitourinary () Problems Sister         endometriosis     Social History     Socioeconomic History   • Marital status: Single     Spouse name: Not on file   • Number of children: Not on file   • Years of education: Not on file   • Highest education level: Not on file   Occupational History   • Not on file   Social Needs   • Financial resource strain: Not on file   • Food insecurity     Worry: Not on file     Inability: Not on file   • Transportation needs     Medical: Not on file     Non-medical: Not on file   Tobacco Use   • Smoking status: Never Smoker   • Smokeless tobacco: Never Used   Substance and Sexual Activity   • Alcohol use: No     Alcohol/week: 0.0 oz   • Drug use: Not Currently     Frequency: 7.0 times per week     Types: Marijuana, Oral     Comment: Medicinal edible's   • Sexual activity: Not Currently     Birth control/protection: Pill   Lifestyle   • Physical activity     Days per week: Not on file     Minutes per session: Not on file   • Stress: Not on file   Relationships   • Social connections     Talks on phone: Not on file     Gets together: Not on file     Attends Restorationist service: Not on file     Active member of club or organization: Not on file     Attends meetings of clubs or organizations: Not on file     Relationship status: Not on file   • Intimate partner violence     Fear of current or ex partner: Not on file     Emotionally abused: Not on file     Physically abused: Not on file     Forced sexual activity: Not on file   Other Topics Concern   • Not on file   Social History Narrative    ** Merged History Encounter **          Allergies   Allergen Reactions   • Depakote [Divalproex Sodium] Unspecified     Muscle spasms/muscle pain and weakness     • Amitriptyline  "Unspecified     Headaches     • Aripiprazole [Abilify] Unspecified     Headaches/muscle twitching     • Clindamycin Nausea     Even with food     • Flagyl [Metronidazole Hcl] Unspecified     \"eye problems\"     • Flomax [Tamsulosin Hydrochloride] Swelling   • Levaquin Unspecified     Severe muscle cramps in legs  RXN=unknown   • Metformin Unspecified     Increased lactic acid      • Tape Rash     Tears skin off  coban with Tegaderm tape ok intermittently  RXN=ongoing   • Vancomycin Itching     Pt becomes flushed in face and chest.   RXN=7/10/16   • Wound Dressing Adhesive Hives     By pt report   • Ciprofloxacin    • Hydromorphone Hcl      Pt states she feels loopy   • Keflex Rash     Pt states she gets a rash with this medication  Tolerates ceftriaxone   • Levofloxacin Unspecified     Leg muscle cramps   • Penicillins      Outpatient Encounter Medications as of 7/10/2020   Medication Sig Dispense Refill   • fluoxetine (PROZAC) 40 MG capsule TAKE ONE CAPSULE BY MOUTH ONCE A DAY 30 Cap 0   • gabapentin (NEURONTIN) 300 MG Cap Take 300 mg by mouth every day.     • methocarbamol (ROBAXIN) 750 MG Tab Take 750 mg by mouth 4 times a day.     • Mirabegron ER (MYRBETRIQ) 50 MG TABLET SR 24 HR Take  by mouth.     • sodium bicarbonate (SODIUM BICARBONATE) 650 MG Tab Take 650 mg by mouth 4 times a day.     • NON SPECIFIED Indications: D-Mannose     • traZODone (DESYREL) 100 MG Tab TAKE  ONE TABLET BY MOUTH NIGHTLY AT BEDTIME 90 Tab 1   • magnesium oxide (MAG-OX) 400 MG Tab tablet Take 1 Tab by mouth every day. 90 Tab 3   • Riboflavin 400 MG Cap Take 1 Cap by mouth every day. 90 Cap 3   • Coenzyme Q10 300 MG Cap Take 1 Cap by mouth every day. 90 Cap 3   • topiramate (TOPAMAX) 25 MG Tab Take 1 Tab by mouth every evening. Take 1 tablet before sleep nightly for 7 days then increase to 2 tablets before bedtime after that 180 Tab 3   • Rizatriptan Benzoate (MAXALT-MLT) 5 MG TABLET DISPERSIBLE Take 1 Tab by mouth two times a week. Take " 1 tablet at the onset of migraine can repeat up to 1 tab more in 24h no more than 10 tablets/mo 10 Tab 3   • OXcarbazepine (TRILEPTAL) 150 MG Tab TAKE ONE TABLET BY MOUTH TWICE DAILY 60 Tab 1   • cefdinir (OMNICEF) 300 MG Cap cefdinir 300 mg capsule     • nitrofurantoin (MACRODANTIN) 50 MG Cap nitrofurantoin macrocrystal 50 mg capsule   Take 1 capsule as needed by oral route.   Take 1 capsule following sexual intercourse     • nystatin (MYCOSTATIN) 827082 UNIT/GM Cream topical cream nystatin 100,000 unit/gram topical cream   APPLY TO THE AFFECTED AREA(S) BY TOPICAL ROUTE 2 TIMES PER DAY     • ondansetron (ZOFRAN ODT) 4 MG TABLET DISPERSIBLE Take 1 Tab by mouth every 6 hours as needed for Nausea. 60 Tab 0   • busPIRone (BUSPAR) 10 MG Tab tablet TAKE ONE TABLET BY MOUTH TWICE DAILY 60 Tab 0   • ziprasidone (GEODON) 40 MG Cap TAKE ONE CAPSULE BY MOUTH TWICE DAILY 60 Cap 0   • acetaminophen (TYLENOL) 500 MG Tab Take 1 Tab by mouth 4 times a day as needed for Mild Pain, Moderate Pain or Fever (Mild Pain; (Pain scale 1-3); Temp greater than 100.5 F). 60 Tab 0   • predniSONE (DELTASONE) 20 MG Tab Take 1 Tab by mouth every day. (Patient taking differently: Take 15 mg by mouth every day.) 40 Tab 0   • acetaZOLAMIDE (DIAMOX) 250 MG Tab Take 1,000-1,500 mg by mouth 2 times a day. 1500 mg in the AM  1000 mg in the PM     • diphenhydrAMINE (BENADRYL) 12.5 MG/5ML Liquid liquid Take 25 mg by mouth 2 times daily with meals as needed. With the Doxycycline     • insulin glargine (LANTUS SOLOSTAR) 100 UNIT/ML Solution Pen-injector injection Inject 15 Units as instructed every evening. 5 PEN 3   • Ivabradine HCl (CORLANOR) 7.5 MG Tab Take 7.5 mg by mouth 2 Times a Day. Needs to schedule follow up with Dr. Wilde 180 Tab 1   • levothyroxine (SYNTHROID) 100 MCG Tab Take 1 Tab by mouth Every morning on an empty stomach. (Patient taking differently: Take 75 mcg by mouth Every morning on an empty stomach.) 30 Tab 2   • vitamin D  (VITAMIND D3) 1000 UNIT Tab Take 1 Tab by mouth every day. 60 Tab 0   • pregabalin (LYRICA) 300 MG capsule Take 300 mg by mouth 2 times a day.     • aspirin EC (ECOTRIN) 81 MG Tablet Delayed Response Take 81 mg by mouth every day.     • potassium chloride SA (KDUR) 20 MEQ Tab CR Take 20 mEq by mouth 2 times a day.     • tiotropium (SPIRIVA) 18 MCG Cap Inhale 1 Cap by mouth every day. 90 Cap 3   • ipratropium-albuterol (DUONEB) 0.5-2.5 (3) MG/3ML nebulizer solution 3 mL by Nebulization route every 6 hours as needed for Shortness of Breath. 60 Bullet 1   • mycophenolate (CELLCEPT) 500 MG tablet Take 1,000 mg by mouth 2 times a day.     • Dulaglutide (TRULICITY) 1.5 MG/0.5ML Solution Pen-injector Inject 1.5 mg as instructed every Friday.     • albuterol 108 (90 Base) MCG/ACT Aero Soln inhalation aerosol Inhale 2 Puffs by mouth every 6 hours as needed for Shortness of Breath.     • Melatonin 10 MG Tab Take 10 mg by mouth every evening.     • furosemide (LASIX) 80 MG Tab Take 80 mg by mouth 1 time daily as needed.     • [DISCONTINUED] naproxen (NAPROSYN) 500 MG Tab Take 1 Tab by mouth 2 times a day, with meals for 10 days. (Patient not taking: Reported on 7/10/2020) 20 Tab 0   • prochlorperazine (COMPAZINE) 10 MG Tab Take 1 Tab by mouth every 6 hours as needed for Nausea/Vomiting (headache). (Patient not taking: Reported on 7/10/2020) 15 Tab 3   • [DISCONTINUED] fluconazole (DIFLUCAN) 150 MG tablet Diflucan 150 mg tablet   Take 1 tablet every day by oral route for 3 days.     • [DISCONTINUED] lactulose 10 GM/15ML Solution Take 15 mL by mouth 2 times a day. (Patient not taking: Reported on 7/10/2020) 1 Bottle 2   • [DISCONTINUED] senna-docusate (PERICOLACE OR SENOKOT S) 8.6-50 MG Tab Take 2 Tabs by mouth 2 times a day.     • [DISCONTINUED] budesonide-formoterol (SYMBICORT) 160-4.5 MCG/ACT Aerosol Inhale 2 Puffs by mouth 2 Times a Day.     • [DISCONTINUED] ondansetron (ZOFRAN ODT) 4 MG TABLET DISPERSIBLE Take 4 mg by mouth  "every 6 hours as needed for Nausea.     • etonogestrel (NEXPLANON) 68 MG Implant implant Inject 1 Each as instructed Once.       No facility-administered encounter medications on file as of 7/10/2020.      Review of Systems   Constitutional: Positive for chills. Negative for fever.   Respiratory: Positive for shortness of breath and wheezing. Negative for cough, hemoptysis and sputum production.    Cardiovascular: Positive for palpitations. Negative for chest pain, orthopnea, claudication, leg swelling and PND.   Gastrointestinal: Positive for nausea and vomiting.   Neurological: Negative for dizziness and headaches.   All other systems reviewed and are negative.       Objective:   /80 (BP Location: Right arm, Patient Position: Sitting, BP Cuff Size: Adult)   Pulse 70   Resp 14   Ht 1.651 m (5' 5\")   Wt 117.9 kg (260 lb)   SpO2 95%   BMI 43.27 kg/m²     Physical Exam   Constitutional: She appears well-developed and well-nourished.   Eyes: EOM are normal.   Neck: Neck supple. No JVD present.   Cardiovascular: Normal rate, regular rhythm and normal heart sounds.   No murmur heard.  Pulmonary/Chest: Effort normal and breath sounds normal.   Abdominal: Soft.   Neurological:   Cranial nerves II-XII WNL   Skin: Skin is warm and dry.   Psychiatric: She has a normal mood and affect. Her behavior is normal. Judgment and thought content normal.   Nursing note and vitals reviewed.      Assessment:     1. H/O prior ablation treatment     2. History of atrial fibrillation and cardiomyopathy?  Cardiac Event Monitor   3. History of supraventricular tachycardia  Cardiac Event Monitor       Medical Decision Making:  Today's Assessment / Status / Plan:   Continue aspirin 81 mg, furosemide 80 mg daily as needed, ivabradine 7.5 mg twice daily.  Obtain ZIO Patch for irregular heart rhythm monitoring and irregular heart rate    Collaborating MD is Dr. Lindo.  Follow-up visit in 6 weeks with myself in 6 months with Dr." Carolyn.    Please note that this dictation was created using voice recognition software.  I have made every reasonable attempt to correct obvious errors, but it is possible there are errors of grammar or possibly content that I did not discover before finalizing the note.

## 2020-07-11 ENCOUNTER — HOSPITAL ENCOUNTER (OUTPATIENT)
Facility: MEDICAL CENTER | Age: 30
End: 2020-07-11
Attending: NURSE PRACTITIONER
Payer: MEDICARE

## 2020-07-11 PROCEDURE — 87186 SC STD MICRODIL/AGAR DIL: CPT

## 2020-07-11 PROCEDURE — 87086 URINE CULTURE/COLONY COUNT: CPT

## 2020-07-11 PROCEDURE — 87077 CULTURE AEROBIC IDENTIFY: CPT

## 2020-07-13 ENCOUNTER — HOSPITAL ENCOUNTER (OUTPATIENT)
Facility: MEDICAL CENTER | Age: 30
End: 2020-07-13
Attending: COLON & RECTAL SURGERY | Admitting: COLON & RECTAL SURGERY
Payer: MEDICARE

## 2020-07-13 ENCOUNTER — APPOINTMENT (OUTPATIENT)
Dept: MEDICAL GROUP | Facility: MEDICAL CENTER | Age: 30
End: 2020-07-13
Payer: MEDICARE

## 2020-07-13 ENCOUNTER — APPOINTMENT (OUTPATIENT)
Dept: RADIOLOGY | Facility: MEDICAL CENTER | Age: 30
End: 2020-07-13
Attending: EMERGENCY MEDICINE
Payer: MEDICARE

## 2020-07-13 ENCOUNTER — TELEMEDICINE (OUTPATIENT)
Dept: MEDICAL GROUP | Facility: MEDICAL CENTER | Age: 30
End: 2020-07-13
Payer: MEDICARE

## 2020-07-13 ENCOUNTER — HOSPITAL ENCOUNTER (EMERGENCY)
Facility: MEDICAL CENTER | Age: 30
End: 2020-07-13
Attending: EMERGENCY MEDICINE
Payer: MEDICARE

## 2020-07-13 VITALS
TEMPERATURE: 99.7 F | OXYGEN SATURATION: 99 % | HEART RATE: 92 BPM | DIASTOLIC BLOOD PRESSURE: 73 MMHG | BODY MASS INDEX: 43.32 KG/M2 | WEIGHT: 260 LBS | SYSTOLIC BLOOD PRESSURE: 121 MMHG | RESPIRATION RATE: 18 BRPM | HEIGHT: 65 IN

## 2020-07-13 DIAGNOSIS — R31.9 URINARY TRACT INFECTION WITH HEMATURIA, SITE UNSPECIFIED: ICD-10-CM

## 2020-07-13 DIAGNOSIS — F44.9 CONVERSION DISORDER: ICD-10-CM

## 2020-07-13 DIAGNOSIS — R19.7 DIARRHEA, UNSPECIFIED TYPE: ICD-10-CM

## 2020-07-13 DIAGNOSIS — N39.0 URINARY TRACT INFECTION WITH HEMATURIA, SITE UNSPECIFIED: ICD-10-CM

## 2020-07-13 DIAGNOSIS — R06.1 STRIDOR: ICD-10-CM

## 2020-07-13 PROBLEM — S09.90XA CLOSED HEAD INJURY: Status: RESOLVED | Noted: 2019-11-30 | Resolved: 2020-07-13

## 2020-07-13 PROCEDURE — A9270 NON-COVERED ITEM OR SERVICE: HCPCS | Performed by: EMERGENCY MEDICINE

## 2020-07-13 PROCEDURE — 99213 OFFICE O/P EST LOW 20 MIN: CPT | Mod: 95,CR | Performed by: INTERNAL MEDICINE

## 2020-07-13 PROCEDURE — 99284 EMERGENCY DEPT VISIT MOD MDM: CPT

## 2020-07-13 PROCEDURE — 700102 HCHG RX REV CODE 250 W/ 637 OVERRIDE(OP): Performed by: EMERGENCY MEDICINE

## 2020-07-13 PROCEDURE — 700117 HCHG RX CONTRAST REV CODE 255: Performed by: EMERGENCY MEDICINE

## 2020-07-13 PROCEDURE — 94640 AIRWAY INHALATION TREATMENT: CPT

## 2020-07-13 PROCEDURE — 70491 CT SOFT TISSUE NECK W/DYE: CPT

## 2020-07-13 PROCEDURE — 700111 HCHG RX REV CODE 636 W/ 250 OVERRIDE (IP): Performed by: EMERGENCY MEDICINE

## 2020-07-13 PROCEDURE — 96374 THER/PROPH/DIAG INJ IV PUSH: CPT | Mod: XU

## 2020-07-13 RX ORDER — MORPHINE SULFATE 4 MG/ML
4 INJECTION, SOLUTION INTRAMUSCULAR; INTRAVENOUS ONCE
Status: COMPLETED | OUTPATIENT
Start: 2020-07-13 | End: 2020-07-13

## 2020-07-13 RX ADMIN — MORPHINE SULFATE 4 MG: 4 INJECTION INTRAVENOUS at 19:41

## 2020-07-13 RX ADMIN — RACEPINEPHRINE HYDROCHLORIDE 0.5 ML: 11.25 SOLUTION RESPIRATORY (INHALATION) at 17:13

## 2020-07-13 RX ADMIN — IOHEXOL 80 ML: 350 INJECTION, SOLUTION INTRAVENOUS at 18:13

## 2020-07-13 ASSESSMENT — FIBROSIS 4 INDEX: FIB4 SCORE: 0.36

## 2020-07-13 NOTE — PROGRESS NOTES
This encounter was conducted via Zoom meeting  Verbal consent was obtained. Patient's identity was verified.       CC: Diarrhea, urinary tract infection.    HPI:   Kristin presents today with the following.    1. Urinary tract infection with hematuria, site unspecified  Presents reporting she is both on Cipro and Augmentin stating that she had brownish urine and overnight she is only put out 50 cc.  She does feel somewhat nauseated.  She is followed by urology denies any kinks or catheter.                                                                                                                                      2. Diarrhea, unspecified type  Persistent diarrhea on 2 antibiotics no fevers chills she is never had a history of C. difficile.      Patient Active Problem List    Diagnosis Date Noted   • Intracranial hypertension 02/27/2020     Priority: High   • Optic neuritis 05/27/2019     Priority: High   • Polypharmacy 06/27/2016     Priority: High   • Bilateral heel pain secondary to calcaneal bone spurs 03/28/2016     Priority: High   • Hypokalemia 09/29/2019     Priority: Medium   • Diabetes mellitus type 2 in obese ([facility]) 04/13/2017     Priority: Medium   • Morbidly obese ([facility]) 03/07/2016     Priority: Medium   • H/O prior ablation treatment 02/10/2016     Priority: Medium   • Immunocompromised ([facility]) 11/06/2019     Priority: Low   • History of atrial fibrillation and cardiomyopathy? 11/06/2019     Priority: Low   • Generalized weakness 09/30/2019     Priority: Low   • Hypothyroidism 08/04/2017     Priority: Low   • Depression 10/28/2016     Priority: Low   • Schizophrenia ([facility]) 10/27/2016     Priority: Low   • GERD (gastroesophageal reflux disease) 03/07/2016     Priority: Low   • Peripheral neuropathy and chornic pain syndrome ([facility]) 03/06/2016     Priority: Low   • Fatty liver disease, nonalcoholic 01/19/2015     Priority: Low   • Anxiety 12/16/2014     Priority: Low   • Knee pain, right 02/13/2014      Priority: Low   • Borderline personality disorder in adult ([facility]) 09/18/2010     Priority: Low   • Other fatigue 06/29/2020   • Type 2 diabetes mellitus without complication, with long-term current use of insulin ([facility]) 05/11/2020   • History of supraventricular tachycardia 04/20/2020   • Schizoaffective disorder, depressive type ([facility]) 03/02/2020   • PTSD (post-traumatic stress disorder) 03/02/2020   • Stress incontinence 12/27/2019   • Mixed stress and urge urinary incontinence 12/27/2019   • Increased frequency of urination 12/27/2019   • History of optic neuritis 12/17/2019   • Mild intermittent asthma without complication 12/16/2019   • Hypocalcemia 11/30/2019   • SVT (supraventricular tachycardia) ([facility]) 04/10/2019   • Functional diarrhea 01/05/2018   • Bowel and bladder incontinence 10/27/2016   • Galactorrhea 07/22/2016   • Scoliosis 03/07/2016       Current Outpatient Medications   Medication Sig Dispense Refill   • fluoxetine (PROZAC) 40 MG capsule TAKE ONE CAPSULE BY MOUTH ONCE A DAY 30 Cap 0   • gabapentin (NEURONTIN) 300 MG Cap Take 300 mg by mouth every day.     • methocarbamol (ROBAXIN) 750 MG Tab Take 750 mg by mouth 4 times a day.     • Mirabegron ER (MYRBETRIQ) 50 MG TABLET SR 24 HR Take  by mouth.     • sodium bicarbonate (SODIUM BICARBONATE) 650 MG Tab Take 650 mg by mouth 4 times a day.     • NON SPECIFIED Indications: D-Mannose     • traZODone (DESYREL) 100 MG Tab TAKE  ONE TABLET BY MOUTH NIGHTLY AT BEDTIME 90 Tab 1   • magnesium oxide (MAG-OX) 400 MG Tab tablet Take 1 Tab by mouth every day. 90 Tab 3   • Riboflavin 400 MG Cap Take 1 Cap by mouth every day. 90 Cap 3   • Coenzyme Q10 300 MG Cap Take 1 Cap by mouth every day. 90 Cap 3   • topiramate (TOPAMAX) 25 MG Tab Take 1 Tab by mouth every evening. Take 1 tablet before sleep nightly for 7 days then increase to 2 tablets before bedtime after that 180 Tab 3   • Rizatriptan Benzoate (MAXALT-MLT) 5 MG TABLET DISPERSIBLE Take 1 Tab by mouth two  times a week. Take 1 tablet at the onset of migraine can repeat up to 1 tab more in 24h no more than 10 tablets/mo 10 Tab 3   • prochlorperazine (COMPAZINE) 10 MG Tab Take 1 Tab by mouth every 6 hours as needed for Nausea/Vomiting (headache). (Patient not taking: Reported on 7/10/2020) 15 Tab 3   • OXcarbazepine (TRILEPTAL) 150 MG Tab TAKE ONE TABLET BY MOUTH TWICE DAILY 60 Tab 1   • cefdinir (OMNICEF) 300 MG Cap cefdinir 300 mg capsule     • nitrofurantoin (MACRODANTIN) 50 MG Cap nitrofurantoin macrocrystal 50 mg capsule   Take 1 capsule as needed by oral route.   Take 1 capsule following sexual intercourse     • nystatin (MYCOSTATIN) 302326 UNIT/GM Cream topical cream nystatin 100,000 unit/gram topical cream   APPLY TO THE AFFECTED AREA(S) BY TOPICAL ROUTE 2 TIMES PER DAY     • ondansetron (ZOFRAN ODT) 4 MG TABLET DISPERSIBLE Take 1 Tab by mouth every 6 hours as needed for Nausea. 60 Tab 0   • busPIRone (BUSPAR) 10 MG Tab tablet TAKE ONE TABLET BY MOUTH TWICE DAILY 60 Tab 0   • ziprasidone (GEODON) 40 MG Cap TAKE ONE CAPSULE BY MOUTH TWICE DAILY 60 Cap 0   • acetaminophen (TYLENOL) 500 MG Tab Take 1 Tab by mouth 4 times a day as needed for Mild Pain, Moderate Pain or Fever (Mild Pain; (Pain scale 1-3); Temp greater than 100.5 F). 60 Tab 0   • predniSONE (DELTASONE) 20 MG Tab Take 1 Tab by mouth every day. (Patient taking differently: Take 15 mg by mouth every day.) 40 Tab 0   • acetaZOLAMIDE (DIAMOX) 250 MG Tab Take 1,000-1,500 mg by mouth 2 times a day. 1500 mg in the AM  1000 mg in the PM     • diphenhydrAMINE (BENADRYL) 12.5 MG/5ML Liquid liquid Take 25 mg by mouth 2 times daily with meals as needed. With the Doxycycline     • insulin glargine (LANTUS SOLOSTAR) 100 UNIT/ML Solution Pen-injector injection Inject 15 Units as instructed every evening. 5 PEN 3   • Ivabradine HCl (CORLANOR) 7.5 MG Tab Take 7.5 mg by mouth 2 Times a Day. Needs to schedule follow up with Dr. Wilde 180 Tab 1   • levothyroxine  (SYNTHROID) 100 MCG Tab Take 1 Tab by mouth Every morning on an empty stomach. (Patient taking differently: Take 75 mcg by mouth Every morning on an empty stomach.) 30 Tab 2   • vitamin D (VITAMIND D3) 1000 UNIT Tab Take 1 Tab by mouth every day. 60 Tab 0   • pregabalin (LYRICA) 300 MG capsule Take 300 mg by mouth 2 times a day.     • aspirin EC (ECOTRIN) 81 MG Tablet Delayed Response Take 81 mg by mouth every day.     • potassium chloride SA (KDUR) 20 MEQ Tab CR Take 20 mEq by mouth 2 times a day.     • tiotropium (SPIRIVA) 18 MCG Cap Inhale 1 Cap by mouth every day. 90 Cap 3   • ipratropium-albuterol (DUONEB) 0.5-2.5 (3) MG/3ML nebulizer solution 3 mL by Nebulization route every 6 hours as needed for Shortness of Breath. 60 Bullet 1   • etonogestrel (NEXPLANON) 68 MG Implant implant Inject 1 Each as instructed Once.     • mycophenolate (CELLCEPT) 500 MG tablet Take 1,000 mg by mouth 2 times a day.     • Dulaglutide (TRULICITY) 1.5 MG/0.5ML Solution Pen-injector Inject 1.5 mg as instructed every Friday.     • albuterol 108 (90 Base) MCG/ACT Aero Soln inhalation aerosol Inhale 2 Puffs by mouth every 6 hours as needed for Shortness of Breath.     • Melatonin 10 MG Tab Take 10 mg by mouth every evening.     • furosemide (LASIX) 80 MG Tab Take 80 mg by mouth 1 time daily as needed.       No current facility-administered medications for this visit.          Allergies as of 07/13/2020 - Reviewed 07/13/2020   Allergen Reaction Noted   • Depakote [divalproex sodium] Unspecified 06/14/2010   • Amitriptyline Unspecified 10/31/2013   • Aripiprazole [abilify] Unspecified 01/17/2013   • Clindamycin Nausea 02/02/2011   • Flagyl [metronidazole hcl] Unspecified 03/31/2011   • Flomax [tamsulosin hydrochloride] Swelling 09/24/2009   • Levaquin Unspecified 10/31/2013   • Metformin Unspecified 07/23/2013   • Tape Rash 08/15/2012   • Vancomycin Itching 07/10/2016   • Wound dressing adhesive Hives 01/12/2018   • Ciprofloxacin   01/16/2020   • Hydromorphone hcl  01/16/2020   • Keflex Rash 01/01/2017   • Levofloxacin Unspecified 10/27/2016   • Penicillins  01/16/2020        ROS:  Denies, chest pain, Shortness of breath, Edema.     There were no vitals taken for this visit.      Physical Exam:  Constitutional: Alert, no distress, well-groomed.  Skin: No rashes in visible areas.  Eye: Round. Conjunctiva clear, lNo icterus.   ENMT: Lips pink without lesions, good dentition, moist mucous membranes. Phonation normal.  Neck: No masses, no thyromegaly. Moves freely without pain.  CV: Pulse as reported by patient  Respiratory: Unlabored respiratory effort, no cough or audible wheeze  Psych: Alert and oriented x3, normal affect and mood.          Assessment and Plan.   29 y.o. female with the following issues.    1. Urinary tract infection with hematuria, site unspecified  Given the decreased urinary output she will contact urology to see if she can be seen seen if urine does not  she will go to the emergency room.    2. Diarrhea, unspecified type  Sent for C. difficile testing.  - Cdiff By PCR Rflx Toxin; Future

## 2020-07-13 NOTE — ED TRIAGE NOTES
Pt brought in by EMS. Pt was seen at [facility] last night for chest pain, was discharged. Pt noticed increase in sob and stridor upon discharge. Pt has hx of asthma, per EMS lung sounds clear. Pt been on cipro for the past 4 days for chronic UTIs. Pt given albuterol tx, 50 mg benadryl, and 125 mg of solumedrol per EMS. Pt states symptoms slightly relieved. Inspiratory stridor noted upon arrival.  Pt arrived with jara cath in place.

## 2020-07-14 NOTE — ED NOTES
Pt laying in bed. Stridor still noted post breathing tx. Pt states no improvement since arrival. Pt able to speak small sentences. call light within reach. Side rails up x 2 for safety.

## 2020-07-14 NOTE — ED PROVIDER NOTES
"ED Provider Note    CHIEF COMPLAINT  Chief Complaint   Patient presents with   • Shortness of Breath     with stridor   • Chest Pain     last night       HPI  Kristin Balderrama is a 29 y.o. female who presents for evaluation of shortness of breath.  She arrives via ambulance.  The patient states that this is been going on for several days, she has asthma and is been using some inhalers without improvement.  She denies any fever, no coughing.  She states that she was evaluated at [facility] yesterday for chest pain and had these current symptoms then.  She has extensive end complicated past medical history as detailed below, contains multiple psychiatric diagnoses.  No abdominal pain or vomiting, no fever, she is not coughing.  She states that her chest pain is still present but improving.  Ambulance crew reports that they treated her with Benadryl, albuterol and Solu-Medrol with no improvement of her symptoms.  They report that despite the loud stridor she was never hypoxic.     REVIEW OF SYSTEMS  Negative for fever, rash, abdominal pain, nausea, vomiting, diarrhea, headache, focal weakness, focal numbness, focal tingling. All other systems are negative.     PAST MEDICAL HISTORY  Past Medical History:   Diagnosis Date   • Abdominal pain    • Anginal syndrome     random chest pain especially with tachycardia   • Apnea, sleep    • Arrhythmia     \"sinus tachycardia\", cariologist, Dr. Kumar; ablation 2/2016   • Arthritis     osteo   • ASTHMA     when around smoke   • Atrial fibrillation (HCC)    • Back pain    • Borderline personality disorder (HCC)    • Breath shortness     with tachycardia   • Bronchitis    • Cardiac arrhythmia    • Chickenpox    • Chronic UTI 9/18/2010   • Cough    • Daytime sleepiness    • Depression    • Diabetes (HCC)    • Diarrhea    • Disorder of thyroid    • Fall    • Fatigue    • Frequent headaches    • Gasping for breath    • Gynecological disorder     PCOS   • " "Headache(784.0)    • Heart burn    • History of falling    • Hypertension    • Indigestion    • Migraine    • 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HCC)    • 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HCC)    • Nausea    • Obesity    • Other fatigue 6/29/2020   • Pain 08-15-12    back, 7/10   • Painful joint    • PCOS (polycystic ovarian syndrome)    • Pneumonia 2012   • Psychosis (HCC)    • Renal disorder     \"kidney disease, stage 1\" nephrologist, Dr. 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   • Urinary bladder disorder     Suprapubic cath   • Urinary incontinence    • Weakness    • Wears glasses        FAMILY HISTORY  Family History   Problem Relation Age of Onset   • Hypertension Mother    • Sleep Apnea Mother    • Heart Disease Mother    • Other Mother         hypothryod   • Hypertension Maternal Uncle    • Heart Disease Maternal Grandmother    • Hypertension Maternal Grandmother    • No Known Problems Sister    • Other Sister         Narcolepsy;fibromyalsia;bone;nerve   • Genitourinary () Problems Sister         endometriosis       SOCIAL HISTORY  Social History     Tobacco Use   • Smoking status: Never Smoker   • Smokeless tobacco: Never Used   Substance Use Topics   • Alcohol use: No     Alcohol/week: 0.0 oz   • Drug use: Not Currently     Frequency: 7.0 times per week     Types: Marijuana, Oral     Comment: Medicinal edible's       SURGICAL HISTORY  Past Surgical History:   Procedure Laterality Date   • MUSCLE BIOPSY Right 1/26/2017    Procedure: MUSCLE BIOPSY - THIGH;  Surgeon: Isidro Vigil M.D.;  Location: SURGERY [facility] ORS;  Service:    • GASTROSCOPY WITH BALLOON DILATATION N/A 1/4/2017    Procedure: GASTROSCOPY WITH DILATATION;  Surgeon: Torres Vargas M.D.;  Location: SURGERY [facility] ORS;  " "Service:    • BOWEL STIMULATOR INSERTION  7/13/2016    Procedure: BOWEL STIMULATOR INSERTION FOR PERMANENT INTERSTIM SACRAL IMPLANT;  Surgeon: Joe Noyola M.D.;  Location: SURGERY [facility] [place] [facility];  Service:    • RECOVERY  1/27/2016    Procedure: CATH LAB EP STUDY WITH SINUS NODE MODIFICATION ABHINAV;  Surgeon: Recoveryonly Surgery;  Location: SURGERY PRE-POST PROC UNIT [facility];  Service:    • OTHER CARDIAC SURGERY  1/2016    cardiac ablation   • NEURO DEST FACET L/S W/IG SNGL  4/21/2015    Performed by Reza Tabor at SURGERY [facility] ORS   • LUMBAR FUSION ANTERIOR  8/21/2012    Performed by SUSIE SAWANT at SURGERY [facility] ORS   • ALYSSA BY LAPAROSCOPY  8/29/2010    Performed by SHAYY JOHNS at SURGERY [facility] ORS   • LAMINOTOMY     • OTHER ABDOMINAL SURGERY     • TONSILLECTOMY      tonsillectomy       CURRENT MEDICATIONS  I personally reviewed the medication list in the charting documentation.     ALLERGIES  Allergies   Allergen Reactions   • Depakote [Divalproex Sodium] Unspecified     Muscle spasms/muscle pain and weakness     • Amitriptyline Unspecified     Headaches     • Aripiprazole [Abilify] Unspecified     Headaches/muscle twitching     • Clindamycin Nausea     Even with food     • Flagyl [Metronidazole Hcl] Unspecified     \"eye problems\"     • Flomax [Tamsulosin Hydrochloride] Swelling   • Levaquin Unspecified     Severe muscle cramps in legs  RXN=unknown   • Metformin Unspecified     Increased lactic acid      • Tape Rash     Tears skin off  coban with Tegaderm tape ok intermittently  RXN=ongoing   • Vancomycin Itching     Pt becomes flushed in face and chest.   RXN=7/10/16   • Wound Dressing Adhesive Hives     By pt report   • Ciprofloxacin    • Hydromorphone Hcl      Pt states she feels loopy   • Keflex Rash     Pt states she gets a rash with this medication  Tolerates ceftriaxone   • Levofloxacin Unspecified     Leg muscle cramps   • Penicillins        MEDICAL RECORD  I have reviewed " "patient's medical record and pertinent results are listed above.      PHYSICAL EXAM  VITAL SIGNS: /71   Pulse 100   Temp 37.6 °C (99.6 °F) (Temporal)   Resp (!) 24   Ht 1.651 m (5' 5\")   Wt 117.9 kg (260 lb)   SpO2 100%   BMI 43.27 kg/m²    Constitutional: Well appearing patient in no acute distress.  Not toxic, nor ill in appearance.  HENT: Mucus membranes moist.    Eyes: No scleral icterus. Normal conjunctiva   Neck: Supple, comfortable, nonpainful range of motion no masses or tenderness.  Stridor is present during isolation but fully resolves when speaking, no noise during inspiration  Cardiovascular: Regular heart rate and rhythm.   Thorax & Lungs: Chest is nontender.  Increased respiratory rate but lungs are crystal clear, no wheezing, good air movement  Abdomen: Soft, with no tenderness, rebound nor guarding.  No mass or pulsatile mass appreciated.  The catheter in place  Skin: Warm, dry. No rash appreciated  Extremities/Musculoskeletal: No sign of trauma. No asymmetric calf tenderness, erythema or edema. Normal range of motion   Neurologic: Alert & oriented. No focal deficits observed.   Psychiatric: Normal affect appropriate for the clinical situation.    DIAGNOSTIC STUDIES / PROCEDURES    RADIOLOGY  CT-SOFT TISSUE NECK WITH   Final Result      1.  Focal narrowing of the airway at the level of the vocal cords is noted which could be due to normal variation or laryngitis.      2.  No other airway abnormalities are identified. No adenopathy or mass is appreciated.            COURSE & MEDICAL DECISION MAKING  I have reviewed any medical record information, laboratory studies and radiographic results as noted above.    Encounter Summary: This is a 29 y.o. female with stridor that is been present for the past 3 or 4 days, associated shortness of breath and chest pain.  She was brought in by ambulance after receiving albuterol and Solu-Medrol as well as Benadryl with no improvement of her symptoms.  I " was called to evaluate her emergently upon her arrival.  Patient is stridulous but that resolves when she speaks, her lungs are clear, she is not hypoxic.  She has an extensive medical history including multiple psychiatric diagnoses.  She is had many evaluations in the emergency room and in fact was evaluated yesterday, or maybe even early this morning, at [place] which time she states that she had the symptoms but I got records from [place] and nowhere as it documented that she had stridor.  I treated her with racemic epi and it did not seem to make much of an immediate impact.  Her stridor is present when she exhales but not during speaking, not during inhalation.  For this reason a CT scan is performed of her neck with IV contrast and it reveals some narrowing of the airway at the level of the vocal cords without any obvious surrounding edema and no focal masses or narrowing of the airway otherwise.  I have a very high suspicion for psychogenic component to this, and in reviewing her records it seems like she was recently evaluated by psychiatry for possible conversion disorder.  The patient at this point call me back into the room to tell me that she thinks she knows what the problem is.  She tells me that the pain that she is experiencing in her bladder secondary to the catheter and UTI is making this happen.  I treated her with little bit of morphine and just like that the stridor resolved.  At this point I had a long discussion with her regarding the possible etiologies of her stridor including psychiatric etiologies, specifically conversion disorder.  She seemed receptive to this idea.  She is being discharged home now in stable condition, no stridor, breathing just fine.      DISPOSITION: Discharge home in stable condition      FINAL IMPRESSION  1. Stridor    2. Conversion disorder           This dictation was created using voice recognition software. The accuracy of the dictation is limited to the  abilities of the software. I expect there may be some errors of grammar and possibly content. The nursing notes were reviewed and certain aspects of this information were incorporated into this note.    Electronically signed by: Pedrito Bhatti M.D., 7/13/2020 5:06 PM

## 2020-07-14 NOTE — ED NOTES
Pt laying in bed. no signs of acute distress. respirations even and unlabored. No stridor noted post pain medication administration. call light within reach. Side rails up x 2 for safety.

## 2020-07-15 NOTE — PROGRESS NOTES
RN unable to chart care plans, ticket in with [employer] help desk.    Patient is progressing with her plan of care. Ambulates with FWW to bathroom with min assist. Percocet relieves her pain. Understands her medications, diabetes/insulin, heparin for blood thinning because she does refuse SCDs.   Turkish

## 2020-07-16 ENCOUNTER — HOSPITAL ENCOUNTER (OUTPATIENT)
Facility: MEDICAL CENTER | Age: 30
End: 2020-07-16
Attending: INTERNAL MEDICINE
Payer: MEDICARE

## 2020-07-16 DIAGNOSIS — R19.7 DIARRHEA, UNSPECIFIED TYPE: ICD-10-CM

## 2020-07-16 PROCEDURE — 87493 C DIFF AMPLIFIED PROBE: CPT

## 2020-07-17 LAB
C DIFF DNA SPEC QL NAA+PROBE: NEGATIVE
C DIFF TOX GENS STL QL NAA+PROBE: NEGATIVE

## 2020-07-17 RX ORDER — ZIPRASIDONE HYDROCHLORIDE 40 MG/1
CAPSULE ORAL
Qty: 60 CAP | Refills: 0 | Status: SHIPPED
Start: 2020-07-17 | End: 2020-08-15

## 2020-07-18 ENCOUNTER — HOSPITAL ENCOUNTER (EMERGENCY)
Facility: MEDICAL CENTER | Age: 30
End: 2020-07-19
Attending: EMERGENCY MEDICINE
Payer: MEDICARE

## 2020-07-18 ENCOUNTER — APPOINTMENT (OUTPATIENT)
Dept: RADIOLOGY | Facility: MEDICAL CENTER | Age: 30
End: 2020-07-18
Attending: EMERGENCY MEDICINE
Payer: MEDICARE

## 2020-07-18 VITALS
BODY MASS INDEX: 43.32 KG/M2 | HEART RATE: 75 BPM | DIASTOLIC BLOOD PRESSURE: 77 MMHG | HEIGHT: 65 IN | SYSTOLIC BLOOD PRESSURE: 126 MMHG | TEMPERATURE: 98 F | OXYGEN SATURATION: 97 % | RESPIRATION RATE: 16 BRPM | WEIGHT: 260 LBS

## 2020-07-18 DIAGNOSIS — R53.1 WEAKNESS: ICD-10-CM

## 2020-07-18 DIAGNOSIS — S06.0X9A CONCUSSION WITH LOSS OF CONSCIOUSNESS, INITIAL ENCOUNTER: ICD-10-CM

## 2020-07-18 DIAGNOSIS — R55 NEAR SYNCOPE: ICD-10-CM

## 2020-07-18 LAB
ANION GAP SERPL CALC-SCNC: 14 MMOL/L (ref 7–16)
BASOPHILS # BLD AUTO: 0.6 % (ref 0–1.8)
BASOPHILS # BLD: 0.04 K/UL (ref 0–0.12)
BUN SERPL-MCNC: 15 MG/DL (ref 8–22)
CALCIUM SERPL-MCNC: 9.4 MG/DL (ref 8.5–10.5)
CHLORIDE SERPL-SCNC: 107 MMOL/L (ref 96–112)
CO2 SERPL-SCNC: 19 MMOL/L (ref 20–33)
CREAT SERPL-MCNC: 1.07 MG/DL (ref 0.5–1.4)
EKG IMPRESSION: NORMAL
EOSINOPHIL # BLD AUTO: 0.06 K/UL (ref 0–0.51)
EOSINOPHIL NFR BLD: 0.9 % (ref 0–6.9)
ERYTHROCYTE [DISTWIDTH] IN BLOOD BY AUTOMATED COUNT: 45.2 FL (ref 35.9–50)
GLUCOSE SERPL-MCNC: 86 MG/DL (ref 65–99)
HCG SERPL QL: NEGATIVE
HCT VFR BLD AUTO: 44.9 % (ref 37–47)
HGB BLD-MCNC: 14.3 G/DL (ref 12–16)
IMM GRANULOCYTES # BLD AUTO: 0.03 K/UL (ref 0–0.11)
IMM GRANULOCYTES NFR BLD AUTO: 0.4 % (ref 0–0.9)
LYMPHOCYTES # BLD AUTO: 1.24 K/UL (ref 1–4.8)
LYMPHOCYTES NFR BLD: 18 % (ref 22–41)
MCH RBC QN AUTO: 29.7 PG (ref 27–33)
MCHC RBC AUTO-ENTMCNC: 31.8 G/DL (ref 33.6–35)
MCV RBC AUTO: 93.2 FL (ref 81.4–97.8)
MONOCYTES # BLD AUTO: 0.6 K/UL (ref 0–0.85)
MONOCYTES NFR BLD AUTO: 8.7 % (ref 0–13.4)
NEUTROPHILS # BLD AUTO: 4.92 K/UL (ref 2–7.15)
NEUTROPHILS NFR BLD: 71.4 % (ref 44–72)
NRBC # BLD AUTO: 0 K/UL
NRBC BLD-RTO: 0 /100 WBC
PLATELET # BLD AUTO: 201 K/UL (ref 164–446)
PMV BLD AUTO: 11.5 FL (ref 9–12.9)
POTASSIUM SERPL-SCNC: 4.2 MMOL/L (ref 3.6–5.5)
RBC # BLD AUTO: 4.82 M/UL (ref 4.2–5.4)
SODIUM SERPL-SCNC: 140 MMOL/L (ref 135–145)
WBC # BLD AUTO: 6.9 K/UL (ref 4.8–10.8)

## 2020-07-18 PROCEDURE — 99284 EMERGENCY DEPT VISIT MOD MDM: CPT

## 2020-07-18 PROCEDURE — 80048 BASIC METABOLIC PNL TOTAL CA: CPT

## 2020-07-18 PROCEDURE — 70450 CT HEAD/BRAIN W/O DYE: CPT

## 2020-07-18 PROCEDURE — 93005 ELECTROCARDIOGRAM TRACING: CPT | Performed by: EMERGENCY MEDICINE

## 2020-07-18 PROCEDURE — 71045 X-RAY EXAM CHEST 1 VIEW: CPT

## 2020-07-18 PROCEDURE — 85025 COMPLETE CBC W/AUTO DIFF WBC: CPT

## 2020-07-18 PROCEDURE — 96374 THER/PROPH/DIAG INJ IV PUSH: CPT

## 2020-07-18 PROCEDURE — 700111 HCHG RX REV CODE 636 W/ 250 OVERRIDE (IP): Performed by: EMERGENCY MEDICINE

## 2020-07-18 PROCEDURE — 84703 CHORIONIC GONADOTROPIN ASSAY: CPT

## 2020-07-18 RX ORDER — ONDANSETRON 2 MG/ML
4 INJECTION INTRAMUSCULAR; INTRAVENOUS ONCE
Status: DISCONTINUED | OUTPATIENT
Start: 2020-07-18 | End: 2020-07-18

## 2020-07-18 RX ORDER — PROCHLORPERAZINE EDISYLATE 5 MG/ML
10 INJECTION INTRAMUSCULAR; INTRAVENOUS ONCE
Status: COMPLETED | OUTPATIENT
Start: 2020-07-18 | End: 2020-07-18

## 2020-07-18 RX ADMIN — PROCHLORPERAZINE EDISYLATE 10 MG: 5 INJECTION INTRAMUSCULAR; INTRAVENOUS at 22:12

## 2020-07-18 ASSESSMENT — FIBROSIS 4 INDEX: FIB4 SCORE: 0.36

## 2020-07-18 NOTE — DOCUMENTATION QUERY
Formerly [facility], later [facility]                                                                       Query Response Note      PATIENT:               HARLEY DE LA CRUZ  ACCT #:                  [id number]  MRN:                     [id number]  :                      1990  ADMIT DATE:       2020 4:45 PM  DISCH DATE:        2020 9:05 PM  RESPONDING  PROVIDER #:        [id number]           QUERY TEXT:    Your assistance is needed in determining the reason  for  CT Neck that was ordered.  This service is non-covered by the diagnoses documented in the medical record.      Can you please provide an additional diagnosis that describes the sign or symptom that  (prompted/initiated) the  CT Neck.    NOTE:  If an appropriate answer is not listed below, please respond with a new note.        The patient's Clinical Indicators include:   CT Neck  Options provided:   -- Other related diagnosis, Please specify diagnosis demonstrating medical necessity for lab/imaging/other test.)   -- Unable to determine      Query created by: Keiry Jorgensen on 2020 4:50 AM    RESPONSE TEXT:    Other related diagnosis- Stridor          Electronically signed by:  DENNIS REGALADO MD 2020 11:32 PM

## 2020-07-19 ENCOUNTER — HOSPITAL ENCOUNTER (OUTPATIENT)
Facility: MEDICAL CENTER | Age: 30
End: 2020-07-19
Payer: MEDICARE

## 2020-07-19 ENCOUNTER — HOSPITAL ENCOUNTER (EMERGENCY)
Facility: MEDICAL CENTER | Age: 30
End: 2020-07-19
Attending: EMERGENCY MEDICINE
Payer: MEDICARE

## 2020-07-19 VITALS
HEIGHT: 65 IN | TEMPERATURE: 98.1 F | OXYGEN SATURATION: 98 % | SYSTOLIC BLOOD PRESSURE: 137 MMHG | WEIGHT: 260.14 LBS | DIASTOLIC BLOOD PRESSURE: 79 MMHG | HEART RATE: 67 BPM | BODY MASS INDEX: 43.34 KG/M2 | RESPIRATION RATE: 17 BRPM

## 2020-07-19 DIAGNOSIS — T83.091A OBSTRUCTION OF FOLEY CATHETER, INITIAL ENCOUNTER (HCC): ICD-10-CM

## 2020-07-19 DIAGNOSIS — T83.9XXA FOLEY CATHETER PROBLEM, INITIAL ENCOUNTER (HCC): ICD-10-CM

## 2020-07-19 LAB
APPEARANCE UR: CLEAR
BILIRUB UR QL STRIP.AUTO: NEGATIVE
COLOR UR: YELLOW
GLUCOSE UR STRIP.AUTO-MCNC: NEGATIVE MG/DL
KETONES UR STRIP.AUTO-MCNC: NEGATIVE MG/DL
LEUKOCYTE ESTERASE UR QL STRIP.AUTO: NEGATIVE
MICRO URNS: NORMAL
NITRITE UR QL STRIP.AUTO: NEGATIVE
PH UR STRIP.AUTO: 5 [PH] (ref 5–8)
PROT UR QL STRIP: NEGATIVE MG/DL
RBC UR QL AUTO: NEGATIVE
SP GR UR STRIP.AUTO: 1.01
UROBILINOGEN UR STRIP.AUTO-MCNC: 0.2 MG/DL

## 2020-07-19 PROCEDURE — 81003 URINALYSIS AUTO W/O SCOPE: CPT

## 2020-07-19 PROCEDURE — 99283 EMERGENCY DEPT VISIT LOW MDM: CPT

## 2020-07-19 PROCEDURE — 51702 INSERT TEMP BLADDER CATH: CPT

## 2020-07-19 PROCEDURE — 302140 GU CART: Performed by: EMERGENCY MEDICINE

## 2020-07-19 PROCEDURE — 303105 HCHG CATHETER EXTRA

## 2020-07-19 PROCEDURE — 87086 URINE CULTURE/COLONY COUNT: CPT

## 2020-07-19 PROCEDURE — 51798 US URINE CAPACITY MEASURE: CPT

## 2020-07-19 ASSESSMENT — FIBROSIS 4 INDEX: FIB4 SCORE: 0.28

## 2020-07-19 NOTE — ED PROVIDER NOTES
"                                                                          ED Provider Note    Scribed for Rahul Segura M.D. by Chase Bernal. 7/18/2020  9:12 PM    CHIEF COMPLAINT  Chief Complaint   Patient presents with   • Syncope     HPI  Kristin Balderrama is a 29 y.o. female with an extensive, complex medical history who presents to the ED brought in by EMS after a GLF secondary to syncopal episode 10 hours ago. Patient states that she was walking from the bathroom when she had a \"shock\" to her head and \"seized up\", and then woke up on the floor. She fell and hit the left side of her head on an unknown object. Her grandma found her on the ground \"hours after the fall\" and called her Neurologist, \"Dr. Yousif\", who informed them to call EMS. She reports that these episodes of \"head shock\" have been happening intermittently for the last couple weeks. Patient has a headache, nausea, mild confusion, and visual disturbances. She reports 1 episode of emesis. Patient has urinary incontinence at baseline. She denies any acute fevers or cough.     PPE Note: I personally donned full PPE for all patient encounters during this visit, including being clean-shaven with an N95 respirator mask, gloves, gown, and goggles.     Scribe remained outside the patient's room and did not have any contact with the patient for the duration of patient encounter.     REVIEW OF SYSTEMS  Constitutional: No fevers, chills, or recent illness.  Skin: No rashes or diaphoresis.  Eyes: Positive for visual changes.   ENT: No hearing change. No rhinorrhea or nasal congestion, no ST or difficulty swallowing.  Respiratory: No SOB. No coughing or hemoptysis. No Wheezing.  Cardiac: No CP, palpitations, edema. No PND or orthopnea.  GI: Positive for nausea and vomiting. No Abdominal Pain; diarrhea, constipation. No blood in stool.  : No dysuria. No D/C. No frequency or urgency. No hesitancy.   MSK: Positive for right knee pain. No calf pain or " swelling.  Neuro: Positive for HA and confusion.. No paresthesias.   Endocrine: No polyuria or polydipsia. No heat or cold intolerance.  Heme: No easy bruising. No history of bleeding disorders or anemia.    PAST MEDICAL HISTORY   has a past medical history of Abdominal pain, Anginal syndrome, Apnea, sleep, Arrhythmia, Arthritis, ASTHMA, Atrial fibrillation ([facility]), Back pain, Borderline personality disorder ([facility]), Breath shortness, Bronchitis, Cardiac arrhythmia, Chickenpox, Chronic UTI (9/18/2010), Cough, Daytime sleepiness, Depression, Diabetes ([facility]), Diarrhea, Disorder of thyroid, Fall, Fatigue, Frequent headaches, Gasping for breath, Gynecological disorder, Headache(784.0), Heart burn, History of falling, Hypertension, Indigestion, Migraine, 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    SOCIAL HISTORY  Social History     Tobacco Use   • Smoking status: Never Smoker   • Smokeless tobacco: Never Used   Substance and Sexual Activity   • Alcohol use: No     Alcohol/week: 0.0 oz   • Drug use: Not Currently     Frequency: 7.0 times per week     Types: Marijuana, Oral     Comment: Medicinal edible's   • Sexual activity: Not Currently     Birth control/protection: Pill     SURGICAL HISTORY   has a past surgical history that includes neuro dest facet l/s w/ig sngl (4/21/2015); recovery (1/27/2016); delmar by laparoscopy (8/29/2010); lumbar fusion anterior (8/21/2012); other cardiac surgery (1/2016); tonsillectomy; bowel stimulator insertion (7/13/2016); gastroscopy with balloon dilatation (N/A, 1/4/2017); muscle biopsy (Right,  "1/26/2017); other abdominal surgery; and laminotomy.    CURRENT MEDICATIONS  Home Medications    **Home medications have not yet been reviewed for this encounter**       ALLERGIES  Allergies   Allergen Reactions   • Depakote [Divalproex Sodium] Unspecified     Muscle spasms/muscle pain and weakness     • Amitriptyline Unspecified     Headaches     • Aripiprazole [Abilify] Unspecified     Headaches/muscle twitching     • Clindamycin Nausea     Even with food     • Flagyl [Metronidazole Hcl] Unspecified     \"eye problems\"     • Flomax [Tamsulosin Hydrochloride] Swelling   • Levaquin Unspecified     Severe muscle cramps in legs  RXN=unknown   • Metformin Unspecified     Increased lactic acid      • Tape Rash     Tears skin off  coban with Tegaderm tape ok intermittently  RXN=ongoing   • Vancomycin Itching     Pt becomes flushed in face and chest.   RXN=7/10/16   • Wound Dressing Adhesive Hives     By pt report   • Ciprofloxacin    • Hydromorphone Hcl      Pt states she feels loopy   • Keflex Rash     Pt states she gets a rash with this medication  Tolerates ceftriaxone   • Levofloxacin Unspecified     Leg muscle cramps   • Penicillins      PHYSICAL EXAM  VITAL SIGNS: /55   Pulse 70   Temp 36.7 °C (98.1 °F)   Resp 18   Ht 1.651 m (5' 5\")   Wt 117.9 kg (260 lb)   SpO2 99%   BMI 43.27 kg/m²    Pulse ox interpretation: I interpret this pulse ox as normal.  Genl: Femal sitting in chair comfortably, chronically ill appearing, morbidly obese, in mild distress.   Head: NC/AT Tenderness to the left temporal region and left orbit. No sunshine sign, no hemotympanum bilaterally, no abrasions, lacerations, or ecchymosis.   ENT: Mucous membranes moist, posterior pharynx clear, uvula midline, nares patent bilaterally   Eyes: Normal sclera, pupils equal round reactive to light  Neck: Supple, FROM, no LAD appreciated   Pulmonary: Lungs are diminished  bilaterally  Chest: No TTP  CV:  RRR, no murmur appreciated, pulses 2+ in " both upper and lower extremities,  Abdomen: soft, NT/ND; no rebound/guarding, no masses palpated, no HSM   : no CVA or suprapubic tenderness   Musculoskeletal: Small, purple ecchymosis to the left patella. Pain free ROM of the neck. Moving upper and lower extremities and spontaneous in coordinated fashion  Neuro: A&Ox4 (person, place, time, situation), speech fluent, gait steady, no focal deficits appreciated, No cerebellar signs  Sensation is grossly intact in the distal upper and lower extremities  5/5 strength in  and dorsiflexion/plantar flexion of the ankles  Psych: Patient has an appropriate affect and behavior  Skin: No rash or lesions.  No pallor or jaundice.  No cyanosis.  Warm and dry.     DIAGNOSTIC STUDIES / PROCEDURES    EKG  Results for orders placed or performed during the hospital encounter of 20   EKG   Result Value Ref Range    Report       [facility] Emergency Dept.    Test Date:  2020  Pt Name:    HARLEY DE LA CRUZ              Department: ER  MRN:        [id number]                      Room:       [facility]  Gender:     Female                       Technician: [id number]  :        1990                   Requested By:ALEXANDER RIVERO  Order #:    [id number]                    Reading MD: Colton Kay MD    Measurements  Intervals                                Axis  Rate:       72                           P:          51  IL:         148                          QRS:        43  QRSD:       96                           T:          51  QT:         416  QTc:        456    Interpretive Statements  SINUS RHYTHM  BORDERLINE T ABNORMALITIES, ANT-LAT LEADS  Compared to ECG 2020 07:14:36  No significant changes  Electronically Signed On 2020 22:46:24 PDT by Colton Kay MD       *Note: Due to a large number of results and/or encounters for the requested time period, some results have not been displayed. A complete set of results can be found in Results Review.      LABS  Labs Reviewed   CBC WITH DIFFERENTIAL - Abnormal; Notable for the following components:       Result Value    MCHC 31.8 (*)     Lymphocytes 18.00 (*)     All other components within normal limits   BASIC METABOLIC PANEL - Abnormal; Notable for the following components:    Co2 19 (*)     All other components within normal limits   HCG QUAL SERUM   ESTIMATED GFR     All labs reviewed by me.    RADIOLOGY  CT-HEAD W/O   Final Result      Head CT without contrast within normal limits. No evidence of acute cerebral infarction, hemorrhage or mass lesion.      DX-CHEST-PORTABLE (1 VIEW)   Final Result      No acute cardiac or pulmonary abnormalities are identified.        The radiologist's interpretations of all radiological studies have been reviewed by me.        COURSE & MEDICAL DECISION MAKING  Pertinent Labs & Imaging studies reviewed. (See chart for details)    Differential diagnoses include but not limited to: Syncope v pre-syncope v intracranial hematoma v seizure v mechanical fall v malingering    9:12 PM - Patient seen and examined at bedside. Ordered CT-head w/o, DX-chest, BMP, CBC with diff, HCG qual serum, and EKG to evaluate her symptoms.     12:53 AM - Patient was reevaluated at bedside. Discussed lab and radiology results with the patient and informed them they are reassuring. Return precautions were discussed with the patient, and they were cleared for discharge at this time. Patient was understanding and agreeable to discharge.     Medical Decision Makin year old morbidly obese female with h/o multiple psychiatric disorders presenting with complaints of a head shock that lead her to fall, hitting her head and rendering her unconscious. She has been evaluated for similar complaints in the past and has follow up with Neurologist as an outpatient. In the ED she is hemodynamically stable and reluctant to participate fully with my physical exam, in particular my neuro exam. She is non-focal. She  "is tender to her left temporal region but there are no gross deformities/abrasions/lacerations/ecchymosis. She has no hemotympanum, battles sign or spinous process tenderness. I strongly suspect she is malingering. In regards to her head \"shocks\" that may or not have been an episodes of syncope an EKG, CBC, CXR and BMP are ordered and unremarkable. She was treated with compazine for her nausea which resolved her symptoms. Due to her loss of consciousness of unknown duration a CT head was ordered which was negative for acute intracranial process.     ED work up largely unremarkable in regards to her complaints. No emergent etiology of her \"head shocks\" identified and her best course of action would be to follow up closely with her Neurologist. Her headache and nausea could likely related to fall/trauma. Cannot rule out post-concussive syndromee.     ATTENDING ATTESTATOIN  I independently evaluated the patient and repeated the important components of the history and physical. I discussed the management with the resident. I have reviewed and agree with the pertinent clinical information above including history, exam, study findings and recommendations    Concern is initially for the patient's multiple previous visits to the ER for very similar complaints in the nebulous nature in which she was following.  On initial assessment she does not have a family member nearby and describes an event that was not necessarily cardiac syncope but may have been secondary to a dysrhythmia or dehydration.  No signs of increased intracranial pressure, she has diffuse tenderness likely secondary to the acute headache that was treated with Compazine and on subsequent reevaluation is completely resolved.  [place] head CT rules used and the patient has acute retrograde amnesia and persistent headache following a closed head injury with a normal GCS and no other signs of visible trauma.    CT of the head shows no acute intracranial " abnormalities, she is ambulated with no focal neurological deficits, there is no evolving findings on repeat exams at this time I believe the patient has her normal baseline chronic medical illnesses and will benefit from routine outpatient follow-up.  She is counseled regarding these findings, the need for proper follow-up, and strict return precautions.    DISPOSITION:  Patient will be discharged home in stable condition.    FOLLOW UP:  Torres Brody M.D.  [address] [place] [address]  [facility] [address]  [phone]    Schedule an appointment as soon as possible for a visit         OUTPATIENT MEDICATIONS:  Discharge Medication List as of 7/18/2020 11:46 PM          FINAL IMPRESSION  Visit Diagnoses     ICD-10-CM   1. Near syncope  R55   2. Weakness  R53.1   3. Concussion with loss of consciousness, initial encounter  S06.0X9A        Chase RODRIGUEZ (Scribe), am scribing for, and in the presence of, Rahul Segura M.D..    Electronically signed by: Chase Bernal (Carolibsadie), 7/18/2020    Rahul RODRIGUEZ M.D. personally performed the services described in this documentation, as scribed by Chase Bernal in my presence, and it is both accurate and complete.    C.    The note accurately reflects work and decisions made by me.  Rahul Segura M.D.  7/18/2020  10:49 PM

## 2020-07-19 NOTE — ED NOTES
Pt tolerating sips of fluids.  Verbalized she uses a wheelchair at home and does not ambulate, falls have been occurring during transfers to toilet or bed. ERP notified, ok to d/c.

## 2020-07-19 NOTE — ED TRIAGE NOTES
Migraine today with syncopal episode at 11:30 AM.  Woke up on floor, c/o left temporal and left anterior knee pain. No open areas noted.

## 2020-07-20 ENCOUNTER — OFFICE VISIT (OUTPATIENT)
Dept: MEDICAL GROUP | Facility: MEDICAL CENTER | Age: 30
End: 2020-07-20
Payer: MEDICARE

## 2020-07-20 VITALS
HEIGHT: 65 IN | OXYGEN SATURATION: 93 % | HEART RATE: 73 BPM | TEMPERATURE: 97 F | SYSTOLIC BLOOD PRESSURE: 116 MMHG | BODY MASS INDEX: 43.32 KG/M2 | WEIGHT: 260 LBS | DIASTOLIC BLOOD PRESSURE: 70 MMHG

## 2020-07-20 DIAGNOSIS — R00.2 PALPITATIONS: ICD-10-CM

## 2020-07-20 DIAGNOSIS — R55 SYNCOPE, UNSPECIFIED SYNCOPE TYPE: ICD-10-CM

## 2020-07-20 PROCEDURE — 99213 OFFICE O/P EST LOW 20 MIN: CPT | Performed by: INTERNAL MEDICINE

## 2020-07-20 ASSESSMENT — FIBROSIS 4 INDEX: FIB4 SCORE: 0.28

## 2020-07-20 NOTE — PROGRESS NOTES
CC: Syncope and palpitations                                                                                                                                    HPI:   Kristin presents today with the following.    1. Palpitations/ Syncope, unspecified syncope type  Presents after 2 episodes of syncope 1 presenting to the emergency room after hitting her head with normal CT.  She has been seen by cardiology and a ZIO patch has been ordered she has not completed.  She denies any chest pain or palpitations in office today.  She reports she is felt like her heart was racing and then went down.  She has associated shortness of breath as well.      Patient Active Problem List    Diagnosis Date Noted   • Intracranial hypertension 02/27/2020     Priority: High   • Optic neuritis 05/27/2019     Priority: High   • Polypharmacy 06/27/2016     Priority: High   • Bilateral heel pain secondary to calcaneal bone spurs 03/28/2016     Priority: High   • Hypokalemia 09/29/2019     Priority: Medium   • Diabetes mellitus type 2 in obese ([facility]) 04/13/2017     Priority: Medium   • Morbidly obese ([facility]) 03/07/2016     Priority: Medium   • H/O prior ablation treatment 02/10/2016     Priority: Medium   • Immunocompromised ([facility]) 11/06/2019     Priority: Low   • History of atrial fibrillation and cardiomyopathy? 11/06/2019     Priority: Low   • Generalized weakness 09/30/2019     Priority: Low   • Hypothyroidism 08/04/2017     Priority: Low   • Depression 10/28/2016     Priority: Low   • Schizophrenia ([facility]) 10/27/2016     Priority: Low   • GERD (gastroesophageal reflux disease) 03/07/2016     Priority: Low   • Peripheral neuropathy and chornic pain syndrome ([facility]) 03/06/2016     Priority: Low   • Fatty liver disease, nonalcoholic 01/19/2015     Priority: Low   • Anxiety 12/16/2014     Priority: Low   • Knee pain, right 02/13/2014     Priority: Low   • Borderline personality disorder in adult ([facility]) 09/18/2010     Priority: Low   • Other  fatigue 06/29/2020   • Type 2 diabetes mellitus without complication, with long-term current use of insulin ([facility]) 05/11/2020   • History of supraventricular tachycardia 04/20/2020   • Schizoaffective disorder, depressive type ([facility]) 03/02/2020   • PTSD (post-traumatic stress disorder) 03/02/2020   • Stress incontinence 12/27/2019   • Mixed stress and urge urinary incontinence 12/27/2019   • Increased frequency of urination 12/27/2019   • History of optic neuritis 12/17/2019   • Mild intermittent asthma without complication 12/16/2019   • Hypocalcemia 11/30/2019   • SVT (supraventricular tachycardia) ([facility]) 04/10/2019   • Functional diarrhea 01/05/2018   • Bowel and bladder incontinence 10/27/2016   • Galactorrhea 07/22/2016   • Scoliosis 03/07/2016       Current Outpatient Medications   Medication Sig Dispense Refill   • ziprasidone (GEODON) 40 MG Cap TAKE ONE CAPSULE BY MOUTH TWICE DAILY 60 Cap 0   • fluoxetine (PROZAC) 40 MG capsule TAKE ONE CAPSULE BY MOUTH ONCE A DAY 30 Cap 0   • gabapentin (NEURONTIN) 300 MG Cap Take 300 mg by mouth every day.     • methocarbamol (ROBAXIN) 750 MG Tab Take 750 mg by mouth 4 times a day.     • Mirabegron ER (MYRBETRIQ) 50 MG TABLET SR 24 HR Take  by mouth.     • sodium bicarbonate (SODIUM BICARBONATE) 650 MG Tab Take 650 mg by mouth 4 times a day.     • NON SPECIFIED Indications: D-Mannose     • traZODone (DESYREL) 100 MG Tab TAKE  ONE TABLET BY MOUTH NIGHTLY AT BEDTIME 90 Tab 1   • magnesium oxide (MAG-OX) 400 MG Tab tablet Take 1 Tab by mouth every day. 90 Tab 3   • Riboflavin 400 MG Cap Take 1 Cap by mouth every day. 90 Cap 3   • Coenzyme Q10 300 MG Cap Take 1 Cap by mouth every day. 90 Cap 3   • topiramate (TOPAMAX) 25 MG Tab Take 1 Tab by mouth every evening. Take 1 tablet before sleep nightly for 7 days then increase to 2 tablets before bedtime after that 180 Tab 3   • Rizatriptan Benzoate (MAXALT-MLT) 5 MG TABLET DISPERSIBLE Take 1 Tab by mouth two times a week. Take 1  tablet at the onset of migraine can repeat up to 1 tab more in 24h no more than 10 tablets/mo 10 Tab 3   • prochlorperazine (COMPAZINE) 10 MG Tab Take 1 Tab by mouth every 6 hours as needed for Nausea/Vomiting (headache). (Patient not taking: Reported on 7/10/2020) 15 Tab 3   • OXcarbazepine (TRILEPTAL) 150 MG Tab TAKE ONE TABLET BY MOUTH TWICE DAILY 60 Tab 1   • cefdinir (OMNICEF) 300 MG Cap cefdinir 300 mg capsule     • nitrofurantoin (MACRODANTIN) 50 MG Cap nitrofurantoin macrocrystal 50 mg capsule   Take 1 capsule as needed by oral route.   Take 1 capsule following sexual intercourse     • nystatin (MYCOSTATIN) 939445 UNIT/GM Cream topical cream nystatin 100,000 unit/gram topical cream   APPLY TO THE AFFECTED AREA(S) BY TOPICAL ROUTE 2 TIMES PER DAY     • ondansetron (ZOFRAN ODT) 4 MG TABLET DISPERSIBLE Take 1 Tab by mouth every 6 hours as needed for Nausea. 60 Tab 0   • busPIRone (BUSPAR) 10 MG Tab tablet TAKE ONE TABLET BY MOUTH TWICE DAILY 60 Tab 0   • acetaminophen (TYLENOL) 500 MG Tab Take 1 Tab by mouth 4 times a day as needed for Mild Pain, Moderate Pain or Fever (Mild Pain; (Pain scale 1-3); Temp greater than 100.5 F). 60 Tab 0   • predniSONE (DELTASONE) 20 MG Tab Take 1 Tab by mouth every day. (Patient taking differently: Take 15 mg by mouth every day.) 40 Tab 0   • acetaZOLAMIDE (DIAMOX) 250 MG Tab Take 1,000-1,500 mg by mouth 2 times a day. 1500 mg in the AM  1000 mg in the PM     • diphenhydrAMINE (BENADRYL) 12.5 MG/5ML Liquid liquid Take 25 mg by mouth 2 times daily with meals as needed. With the Doxycycline     • insulin glargine (LANTUS SOLOSTAR) 100 UNIT/ML Solution Pen-injector injection Inject 15 Units as instructed every evening. 5 PEN 3   • Ivabradine HCl (CORLANOR) 7.5 MG Tab Take 7.5 mg by mouth 2 Times a Day. Needs to schedule follow up with Dr. Wilde 180 Tab 1   • levothyroxine (SYNTHROID) 100 MCG Tab Take 1 Tab by mouth Every morning on an empty stomach. (Patient taking differently:  Take 75 mcg by mouth Every morning on an empty stomach.) 30 Tab 2   • vitamin D (VITAMIND D3) 1000 UNIT Tab Take 1 Tab by mouth every day. 60 Tab 0   • pregabalin (LYRICA) 300 MG capsule Take 300 mg by mouth 2 times a day.     • aspirin EC (ECOTRIN) 81 MG Tablet Delayed Response Take 81 mg by mouth every day.     • potassium chloride SA (KDUR) 20 MEQ Tab CR Take 20 mEq by mouth 2 times a day.     • tiotropium (SPIRIVA) 18 MCG Cap Inhale 1 Cap by mouth every day. 90 Cap 3   • ipratropium-albuterol (DUONEB) 0.5-2.5 (3) MG/3ML nebulizer solution 3 mL by Nebulization route every 6 hours as needed for Shortness of Breath. 60 Bullet 1   • etonogestrel (NEXPLANON) 68 MG Implant implant Inject 1 Each as instructed Once.     • mycophenolate (CELLCEPT) 500 MG tablet Take 1,000 mg by mouth 2 times a day.     • Dulaglutide (TRULICITY) 1.5 MG/0.5ML Solution Pen-injector Inject 1.5 mg as instructed every Friday.     • albuterol 108 (90 Base) MCG/ACT Aero Soln inhalation aerosol Inhale 2 Puffs by mouth every 6 hours as needed for Shortness of Breath.     • Melatonin 10 MG Tab Take 10 mg by mouth every evening.     • furosemide (LASIX) 80 MG Tab Take 80 mg by mouth 1 time daily as needed.       No current facility-administered medications for this visit.          Allergies as of 07/20/2020 - Reviewed 07/20/2020   Allergen Reaction Noted   • Depakote [divalproex sodium] Unspecified 06/14/2010   • Amitriptyline Unspecified 10/31/2013   • Aripiprazole [abilify] Unspecified 01/17/2013   • Clindamycin Nausea 02/02/2011   • Flagyl [metronidazole hcl] Unspecified 03/31/2011   • Flomax [tamsulosin hydrochloride] Swelling 09/24/2009   • Levaquin Unspecified 10/31/2013   • Metformin Unspecified 07/23/2013   • Tape Rash 08/15/2012   • Vancomycin Itching 07/10/2016   • Wound dressing adhesive Hives 01/12/2018   • Ciprofloxacin  01/16/2020   • Hydromorphone hcl  01/16/2020   • Keflex Rash 01/01/2017   • Levofloxacin Unspecified 10/27/2016   •  "Penicillins  01/16/2020        ROS: Denies Chest pain, SOB, LE edema.    /70 (BP Location: Left arm, Patient Position: Sitting)   Pulse 73   Temp 36.1 °C (97 °F)   Ht 1.651 m (5' 5\")   Wt 117.9 kg (260 lb)   SpO2 93%   BMI 43.27 kg/m²     Physical Exam:  Gen:         Alert and oriented, No apparent distress.  Neck:        No Lymphadenopathy or Bruits.  Lungs:     Clear to auscultation bilaterally  CV:          Regular rate and rhythm. No murmurs, rubs or gallops.               Ext:          No clubbing, cyanosis, edema.      Assessment and Plan.   29 y.o. female with the following issues.    1. Palpitations/Syncope, unspecified syncope type  Have given the number to call for ZIO Patch already ordered by cardiology and referral back to them today.  Seems that things have escalated in terms of severity and frequency.  - REFERRAL TO CARDIOLOGY Cardiac Electrophysiology          "

## 2020-07-20 NOTE — ED PROVIDER NOTES
ED Provider Note    Scribed for Glynn Hinds M.D. by Ruth Ann Lopez. 7/19/2020, 7:42 PM.    Primary care provider: Torres Brody M.D.  Means of arrival: wheel chair  History obtained from: patient  History limited by: none    CHIEF COMPLAINT  Chief Complaint   Patient presents with   • Urinary Catheter Problem     Pt had catheter placed today at 1100 and is reporting decreased output and pain.       HPI  Kristin Balderrama is a 29 y.o. female with a complex medical history who presents to the Emergency Department for evaluation of urinary retention, onset earlier today. Patient reports that starting at 6AM this morning, she was unable to void any urine. She contacted [facility] who came and placed a Andrade catheter for her around 11AM. Patient was able to void into that catheter for a short time, however, after a few hours she began experiencing decreased urine output and lower abdominal pain and distention. She additionally notes some leakage from around the catheter as well. Patient has a history of urinary retention and has had a suprapubic catheter placed in the past secondary to this. She follows with Dr. Rosenbaum, Urology. No complaints of hematuria, fever, chills, nausea, vomiting. Patient denies any cough or cold medications taken today. She has been taking Tylenol for her discomforts.    PPE Note: I personally donned full PPE for all patient encounters during this visit, including being clean-shaven with an N95 respirator mask, gloves, gown, and goggles.     Scribe remained outside the patient's room and did not have any contact with the patient for the duration of patient encounter.     REVIEW OF SYSTEMS  Pertinent positives include urinary retention, pain and distension in the lower abdomen. Pertinent negatives include hematuria, fever, chills, nausea, vomiting.    PAST MEDICAL HISTORY   has a past medical history of Abdominal pain, Anginal syndrome, Apnea, sleep, Arrhythmia, Arthritis,  ASTHMA, Atrial fibrillation (HCC), Back pain, Borderline personality disorder (HCC), Breath shortness, Bronchitis, Cardiac arrhythmia, Chickenpox, Chronic UTI (9/18/2010), Cough, Daytime sleepiness, Depression, Diabetes (HCC), Diarrhea, Disorder of thyroid, Fall, Fatigue, Frequent headaches, Gasping for breath, Gynecological disorder, Headache(784.0), Heart burn, History of falling, Hypertension, Indigestion, Migraine, 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HCC), 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HCC), Nausea, Obesity, Other fatigue (6/29/2020), Pain (08-15-12), Painful joint, PCOS (polycystic ovarian syndrome), Pneumonia (2012), Psychosis (HCC), Renal disorder, Ringing in ears, Scoliosis, Shortness of breath, Sinus tachycardia (10/31/2013), Sleep apnea, Snoring, Tonsillitis, Tuberculosis, Urinary bladder disorder, Urinary incontinence, Weakness, and Wears glasses.    SURGICAL HISTORY   has a past surgical history that includes neuro dest facet l/s w/ig sngl (4/21/2015); recovery (1/27/2016); delmar by laparoscopy (8/29/2010); lumbar fusion anterior (8/21/2012); other cardiac surgery (1/2016); tonsillectomy; bowel stimulator insertion (7/13/2016); gastroscopy with balloon dilatation (N/A, 1/4/2017); muscle biopsy (Right, 1/26/2017); other abdominal surgery; and laminotomy.    SOCIAL HISTORY  Social History     Tobacco Use   • Smoking status: Never Smoker   • Smokeless tobacco: Never Used   Substance Use Topics   • Alcohol use: No     Alcohol/week: 0.0 oz   • Drug use: Not Currently     Frequency: 7.0 times per week     Types: Marijuana      Social History     Substance and Sexual Activity   Drug Use Not Currently   • Frequency: 7.0 times per week   • Types: Marijuana       FAMILY HISTORY  Family History   Problem Relation Age of Onset   • Hypertension Mother    • Sleep Apnea Mother    • Heart Disease Mother    • Other Mother         hypothryod   • Hypertension Maternal Uncle    • Heart Disease Maternal Grandmother    • Hypertension  Maternal Grandmother    • No Known Problems Sister    • Other Sister         Narcolepsy;fibromyalsia;bone;nerve   • Genitourinary () Problems Sister         endometriosis       CURRENT MEDICATIONS  Home Medications     Reviewed by Akua Dobson R.N. (Registered Nurse) on 07/19/20 at 1713  Med List Status: Not Addressed   Medication Last Dose Status   acetaminophen (TYLENOL) 500 MG Tab  Active   acetaZOLAMIDE (DIAMOX) 250 MG Tab  Active   albuterol 108 (90 Base) MCG/ACT Aero Soln inhalation aerosol  Active   aspirin EC (ECOTRIN) 81 MG Tablet Delayed Response  Active   busPIRone (BUSPAR) 10 MG Tab tablet  Active   cefdinir (OMNICEF) 300 MG Cap  Active   Coenzyme Q10 300 MG Cap  Active   diphenhydrAMINE (BENADRYL) 12.5 MG/5ML Liquid liquid  Active   Dulaglutide (TRULICITY) 1.5 MG/0.5ML Solution Pen-injector  Active   etonogestrel (NEXPLANON) 68 MG Implant implant  Active   fluoxetine (PROZAC) 40 MG capsule  Active   furosemide (LASIX) 80 MG Tab  Active   gabapentin (NEURONTIN) 300 MG Cap  Active   insulin glargine (LANTUS SOLOSTAR) 100 UNIT/ML Solution Pen-injector injection  Active   ipratropium-albuterol (DUONEB) 0.5-2.5 (3) MG/3ML nebulizer solution  Active   Ivabradine HCl (CORLANOR) 7.5 MG Tab  Active   levothyroxine (SYNTHROID) 100 MCG Tab  Active   magnesium oxide (MAG-OX) 400 MG Tab tablet  Active   Melatonin 10 MG Tab  Active   methocarbamol (ROBAXIN) 750 MG Tab  Active   Mirabegron ER (MYRBETRIQ) 50 MG TABLET SR 24 HR  Active   mycophenolate (CELLCEPT) 500 MG tablet  Active   nitrofurantoin (MACRODANTIN) 50 MG Cap  Active   NON SPECIFIED  Active   nystatin (MYCOSTATIN) 720320 UNIT/GM Cream topical cream  Active   ondansetron (ZOFRAN ODT) 4 MG TABLET DISPERSIBLE  Active   OXcarbazepine (TRILEPTAL) 150 MG Tab  Active   potassium chloride SA (KDUR) 20 MEQ Tab CR  Active   predniSONE (DELTASONE) 20 MG Tab  Active   pregabalin (LYRICA) 300 MG capsule  Active   prochlorperazine (COMPAZINE) 10 MG Tab  Active  "  Riboflavin 400 MG Cap  Active   Rizatriptan Benzoate (MAXALT-MLT) 5 MG TABLET DISPERSIBLE  Active   sodium bicarbonate (SODIUM BICARBONATE) 650 MG Tab  Active   tiotropium (SPIRIVA) 18 MCG Cap  Active   topiramate (TOPAMAX) 25 MG Tab  Active   traZODone (DESYREL) 100 MG Tab  Active   vitamin D (VITAMIND D3) 1000 UNIT Tab  Active   ziprasidone (GEODON) 40 MG Cap  Active                ALLERGIES  Allergies   Allergen Reactions   • Depakote [Divalproex Sodium] Unspecified     Muscle spasms/muscle pain and weakness     • Amitriptyline Unspecified     Headaches     • Aripiprazole [Abilify] Unspecified     Headaches/muscle twitching     • Clindamycin Nausea     Even with food     • Flagyl [Metronidazole Hcl] Unspecified     \"eye problems\"     • Flomax [Tamsulosin Hydrochloride] Swelling   • Levaquin Unspecified     Severe muscle cramps in legs  RXN=unknown   • Metformin Unspecified     Increased lactic acid      • Tape Rash     Tears skin off  coban with Tegaderm tape ok intermittently  RXN=ongoing   • Vancomycin Itching     Pt becomes flushed in face and chest.   RXN=7/10/16   • Wound Dressing Adhesive Hives     By pt report   • Ciprofloxacin    • Hydromorphone Hcl      Pt states she feels loopy   • Keflex Rash     Pt states she gets a rash with this medication  Tolerates ceftriaxone   • Levofloxacin Unspecified     Leg muscle cramps   • Penicillins        PHYSICAL EXAM  VITAL SIGNS: /81   Pulse 88   Temp 36.3 °C (97.3 °F) (Temporal)   Resp 16   Ht 1.651 m (5' 5\")   Wt 118 kg (260 lb 2.3 oz)   SpO2 95%   Breastfeeding No   BMI 43.29 kg/m²     Constitutional: Well developed, Well nourished, mild distress.   HENT: Normocephalic, Atraumatic mask in place  Eyes: Conjunctiva normal, No discharge.   Cardiovascular: Normal heart rate, Normal rhythm, No murmurs, equal pulses.   Pulmonary: Normal breath sounds, No respiratory distress, No wheezing, No rales, No rhonchi.  Abdomen:obese, moderate lower abdominal " tenderness and distention, No masses, no rebound, no guarding.   Back: mild right CVA tenderness.   Skin: Warm, Dry, No erythema, No rash.   Neurologic: Alert & oriented x 3, Normal motor function,  No focal deficits noted.   Psychiatric: Affect normal, Judgment normal, Mood normal.     LABWORK  Results for orders placed or performed during the hospital encounter of 07/19/20   URINALYSIS CULTURE, IF INDICATED    Specimen: Urine   Result Value Ref Range    Color Yellow     Character Clear     Specific Gravity 1.011 <1.035    Ph 5.0 5.0 - 8.0    Glucose Negative Negative mg/dL    Ketones Negative Negative mg/dL    Protein Negative Negative mg/dL    Bilirubin Negative Negative    Urobilinogen, Urine 0.2 Negative    Nitrite Negative Negative    Leukocyte Esterase Negative Negative    Occult Blood Negative Negative    Micro Urine Req see below      *Note: Due to a large number of results and/or encounters for the requested time period, some results have not been displayed. A complete set of results can be found in Results Review.       COURSE & MEDICAL DECISION MAKING  Pertinent Labs & Imaging studies reviewed. (See chart for details)    7:42 PM - Patient seen and examined at bedside. I informed the patient that I would have them flush the catheter to see if that helps relieve the retention. If that is unsuccessful, I explained that we would place a new catheter. Patient understands and agrees with treatment plan.    8:00 PM Bladder scan shows 980ml of retained urine. US of the bladder completed by myself at bedside shows the bladder is mildly full, the catheter balloon does appear to be in the bladder, there may be some sediment present.    9:28 PM Nursing staff placed an 18 Andrade catheter and the patient voided approximately 300ml of urine. There is still some leakage from around the catheter. UA was completed with no indications of infection. Patient does note that her discomfort is improved.    10:07 PM I  re-evaluated patient at bedside and informed her of her work up results. As of now the catheter is still draining well. I informed the patient that she will be discharged, advising her to follow up with her urologist  Dr. Rosenbaum as soon as possible. Patient understands and is agreeable with this.    Medical Decision Making: At this point time I think patient most likely has a leak around her Andrade catheter causing her not to have as much urine in the catheter bag.  Patient had several Andrade was placed and continued leak around them until we got to a 24-gauge at this point time patient will be discharged home she does not show any signs of urinary tract infection.    The patient will return for new or worsening symptoms and is stable at the time of discharge.    DISPOSITION:  Patient will be discharged home in stable condition.    FOLLOW UP:  Rolf Rosenbaum M.D.  [address]  [place] [address]  [phone]    Schedule an appointment as soon as possible for a visit         FINAL IMPRESSION  1. Obstruction of Andrade catheter, initial encounter (HCC)    2. Andrade catheter problem, initial encounter (HCC)          Ruth Ann RODRIGUEZ (Scrroseanne), am scribing for, and in the presence of, Glynn Hinds M.D.    Electronically signed by: Ruth Ann Lopez (Eva), 7/19/2020    Glynn RODRIGUEZ M.D. personally performed the services described in this documentation, as scribed by Ruth Ann Lopez in my presence, and it is both accurate and complete.    The note accurately reflects work and decisions made by me.  Glynn Hinds M.D.  7/20/2020  4:14 AM

## 2020-07-20 NOTE — ED TRIAGE NOTES
"Chief Complaint   Patient presents with   • Urinary Catheter Problem     Pt had catheter placed today at 1100 and is reporting decreased output and pain.     /93   Pulse 96   Temp 37.1 °C (98.8 °F) (Temporal)   Resp 16   Ht 1.651 m (5' 5\")   Wt 118 kg (260 lb 2.3 oz)   SpO2 93%   Breastfeeding No   BMI 43.29 kg/m²     Pt brought into triage by WC, mask in place. VS as above, NAD, encouraged to return to the triage nurse or tech with any new complaints or symptoms.  "

## 2020-07-20 NOTE — ED NOTES
Kristin Balderrama is being discharged from the Emergency Department in stable condition. Discharge and follow up instructions were given to patient.   Kristin Balderrama is alert and oriented and verbalizes understanding. VSS. The patient ambulates with steady gait.

## 2020-07-20 NOTE — ED TRIAGE NOTES
"Patient vital signs rechecked and documented per [facility]. Patient denies any new needs at this time.  Patient updated on wait times, thanked for patience. Pt informed to alert triage tech or triage RN with any needs and/or changes in condition; patient verbalized understanding.     Patient stated \"I just had one of my episodes. Its a brain fart. Shaking and forgetting things.\"    "

## 2020-07-21 ENCOUNTER — HOSPITAL ENCOUNTER (EMERGENCY)
Facility: MEDICAL CENTER | Age: 30
End: 2020-07-21
Attending: EMERGENCY MEDICINE
Payer: MEDICARE

## 2020-07-21 ENCOUNTER — APPOINTMENT (OUTPATIENT)
Dept: RADIOLOGY | Facility: MEDICAL CENTER | Age: 30
End: 2020-07-21
Attending: EMERGENCY MEDICINE
Payer: MEDICARE

## 2020-07-21 VITALS
TEMPERATURE: 98.6 F | DIASTOLIC BLOOD PRESSURE: 70 MMHG | BODY MASS INDEX: 43.32 KG/M2 | HEIGHT: 65 IN | OXYGEN SATURATION: 98 % | WEIGHT: 260 LBS | SYSTOLIC BLOOD PRESSURE: 117 MMHG | RESPIRATION RATE: 16 BRPM | HEART RATE: 75 BPM

## 2020-07-21 DIAGNOSIS — R07.9 CHEST PAIN, UNSPECIFIED TYPE: ICD-10-CM

## 2020-07-21 LAB — EKG IMPRESSION: NORMAL

## 2020-07-21 PROCEDURE — 93005 ELECTROCARDIOGRAM TRACING: CPT | Performed by: EMERGENCY MEDICINE

## 2020-07-21 PROCEDURE — 99284 EMERGENCY DEPT VISIT MOD MDM: CPT

## 2020-07-21 PROCEDURE — 71045 X-RAY EXAM CHEST 1 VIEW: CPT

## 2020-07-21 ASSESSMENT — LIFESTYLE VARIABLES: DO YOU DRINK ALCOHOL: NO

## 2020-07-21 ASSESSMENT — FIBROSIS 4 INDEX: FIB4 SCORE: 0.28

## 2020-07-21 NOTE — DISCHARGE PLANNING
Medical Social Work    Referral: Transport Home    Intervention: MSW spoke with bedside RN who states that pt generally gets [facility] transport home and is ready now.  RN verified pt's home address: [address] [place] [address], [place] [address] and pt's grandmother is home to let [facility] in.  MSW contacted Stephanie with [facility] for stretcher transport.  MSW faxed PCS and facesheet to [facility] and made follow up phone call to [place] to arrange transport for 0510.  Unit clerk made aware of transport time as RN was occupied with another pt.      Plan: Pt to D/C home via [facility] at 0510.

## 2020-07-21 NOTE — ED PROVIDER NOTES
"ER Provider Note     Scribed for Noman Zavala M.D. by Chase Bernal. 7/21/2020, 3:27 AM.    Primary Care Provider: Torres Brody M.D.  Means of Arrival: EMS   History obtained from: Patient  History limited by: None     CHIEF COMPLAINT  Chief Complaint   Patient presents with   • Chest Pain     Started 12 hours ago while having a BM.        HPI  Kristin Balderrama is a 29 y.o. female with a complex medical history who presents to the Emergency Department brought in by EMS for acute, moderate chest pain onset 12 hours ago. Patient states that she had a \"brain shock\" and afterwards developed chest pain which radiates to her back. There are no known alleviating or exacerbating factors. She denies any acute fever, cough, or constipation.     REVIEW OF SYSTEMS  See HPI for further details. All other systems are negative.     PAST MEDICAL HISTORY   has a past medical history of Abdominal pain, Anginal syndrome, Apnea, sleep, Arrhythmia, Arthritis, ASTHMA, Atrial fibrillation (HCC), Back pain, Borderline personality disorder (HCC), Breath shortness, Bronchitis, Cardiac arrhythmia, Chickenpox, Chronic UTI (9/18/2010), Cough, Daytime sleepiness, Depression, Diabetes (HCC), Diarrhea, Disorder of thyroid, Fall, Fatigue, Frequent headaches, Gasping for breath, Gynecological disorder, Headache(784.0), Heart burn, History of falling, Hypertension, Indigestion, Migraine, 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HCC), 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HCC), Nausea, Obesity, Other fatigue (6/29/2020), Pain (08-15-12), Painful joint, PCOS (polycystic ovarian syndrome), Pneumonia (2012), Psychosis ([facility]), Renal disorder, Ringing in ears, Scoliosis, Shortness of breath, Sinus tachycardia (10/31/2013), Sleep apnea, Snoring, Tonsillitis, Tuberculosis, Urinary bladder disorder, Urinary incontinence, Weakness, and Wears glasses.    SURGICAL HISTORY   has a past surgical history that includes neuro dest facet l/s w/ig sngl (4/21/2015); recovery " (1/27/2016); delmar by laparoscopy (8/29/2010); lumbar fusion anterior (8/21/2012); other cardiac surgery (1/2016); tonsillectomy; bowel stimulator insertion (7/13/2016); gastroscopy with balloon dilatation (N/A, 1/4/2017); muscle biopsy (Right, 1/26/2017); other abdominal surgery; and laminotomy.    SOCIAL HISTORY  Social History     Tobacco Use   • Smoking status: Never Smoker   • Smokeless tobacco: Never Used   Substance Use Topics   • Alcohol use: No     Alcohol/week: 0.0 oz   • Drug use: Not Currently     Frequency: 7.0 times per week     Types: Marijuana      Social History     Substance and Sexual Activity   Drug Use Not Currently   • Frequency: 7.0 times per week   • Types: Marijuana       FAMILY HISTORY  Family History   Problem Relation Age of Onset   • Hypertension Mother    • Sleep Apnea Mother    • Heart Disease Mother    • Other Mother         hypothryod   • Hypertension Maternal Uncle    • Heart Disease Maternal Grandmother    • Hypertension Maternal Grandmother    • No Known Problems Sister    • Other Sister         Narcolepsy;fibromyalsia;bone;nerve   • Genitourinary () Problems Sister         endometriosis       CURRENT MEDICATIONS  Current Outpatient Medications:   •  ziprasidone (GEODON) 40 MG Cap, TAKE ONE CAPSULE BY MOUTH TWICE DAILY, Disp: 60 Cap, Rfl: 0  •  fluoxetine (PROZAC) 40 MG capsule, TAKE ONE CAPSULE BY MOUTH ONCE A DAY, Disp: 30 Cap, Rfl: 0  •  gabapentin (NEURONTIN) 300 MG Cap, Take 300 mg by mouth every day., Disp: , Rfl:   •  methocarbamol (ROBAXIN) 750 MG Tab, Take 750 mg by mouth 4 times a day., Disp: , Rfl:   •  Mirabegron ER (MYRBETRIQ) 50 MG TABLET SR 24 HR, Take  by mouth., Disp: , Rfl:   •  sodium bicarbonate (SODIUM BICARBONATE) 650 MG Tab, Take 650 mg by mouth 4 times a day., Disp: , Rfl:   •  NON SPECIFIED, Indications: D-Mannose, Disp: , Rfl:   •  traZODone (DESYREL) 100 MG Tab, TAKE  ONE TABLET BY MOUTH NIGHTLY AT BEDTIME, Disp: 90 Tab, Rfl: 1  •  magnesium oxide  (MAG-OX) 400 MG Tab tablet, Take 1 Tab by mouth every day., Disp: 90 Tab, Rfl: 3  •  Riboflavin 400 MG Cap, Take 1 Cap by mouth every day., Disp: 90 Cap, Rfl: 3  •  Coenzyme Q10 300 MG Cap, Take 1 Cap by mouth every day., Disp: 90 Cap, Rfl: 3  •  topiramate (TOPAMAX) 25 MG Tab, Take 1 Tab by mouth every evening. Take 1 tablet before sleep nightly for 7 days then increase to 2 tablets before bedtime after that, Disp: 180 Tab, Rfl: 3  •  Rizatriptan Benzoate (MAXALT-MLT) 5 MG TABLET DISPERSIBLE, Take 1 Tab by mouth two times a week. Take 1 tablet at the onset of migraine can repeat up to 1 tab more in 24h no more than 10 tablets/mo, Disp: 10 Tab, Rfl: 3  •  prochlorperazine (COMPAZINE) 10 MG Tab, Take 1 Tab by mouth every 6 hours as needed for Nausea/Vomiting (headache). (Patient not taking: Reported on 7/10/2020), Disp: 15 Tab, Rfl: 3  •  OXcarbazepine (TRILEPTAL) 150 MG Tab, TAKE ONE TABLET BY MOUTH TWICE DAILY, Disp: 60 Tab, Rfl: 1  •  cefdinir (OMNICEF) 300 MG Cap, cefdinir 300 mg capsule, Disp: , Rfl:   •  nitrofurantoin (MACRODANTIN) 50 MG Cap, nitrofurantoin macrocrystal 50 mg capsule  Take 1 capsule as needed by oral route.  Take 1 capsule following sexual intercourse, Disp: , Rfl:   •  nystatin (MYCOSTATIN) 890241 UNIT/GM Cream topical cream, nystatin 100,000 unit/gram topical cream  APPLY TO THE AFFECTED AREA(S) BY TOPICAL ROUTE 2 TIMES PER DAY, Disp: , Rfl:   •  ondansetron (ZOFRAN ODT) 4 MG TABLET DISPERSIBLE, Take 1 Tab by mouth every 6 hours as needed for Nausea., Disp: 60 Tab, Rfl: 0  •  busPIRone (BUSPAR) 10 MG Tab tablet, TAKE ONE TABLET BY MOUTH TWICE DAILY, Disp: 60 Tab, Rfl: 0  •  acetaminophen (TYLENOL) 500 MG Tab, Take 1 Tab by mouth 4 times a day as needed for Mild Pain, Moderate Pain or Fever (Mild Pain; (Pain scale 1-3); Temp greater than 100.5 F)., Disp: 60 Tab, Rfl: 0  •  predniSONE (DELTASONE) 20 MG Tab, Take 1 Tab by mouth every day. (Patient taking differently: Take 15 mg by mouth every  day.), Disp: 40 Tab, Rfl: 0  •  acetaZOLAMIDE (DIAMOX) 250 MG Tab, Take 1,000-1,500 mg by mouth 2 times a day. 1500 mg in the AM 1000 mg in the PM, Disp: , Rfl:   •  diphenhydrAMINE (BENADRYL) 12.5 MG/5ML Liquid liquid, Take 25 mg by mouth 2 times daily with meals as needed. With the Doxycycline, Disp: , Rfl:   •  insulin glargine (LANTUS SOLOSTAR) 100 UNIT/ML Solution Pen-injector injection, Inject 15 Units as instructed every evening., Disp: 5 PEN, Rfl: 3  •  Ivabradine HCl (CORLANOR) 7.5 MG Tab, Take 7.5 mg by mouth 2 Times a Day. Needs to schedule follow up with Dr. Wilde, Disp: 180 Tab, Rfl: 1  •  levothyroxine (SYNTHROID) 100 MCG Tab, Take 1 Tab by mouth Every morning on an empty stomach. (Patient taking differently: Take 75 mcg by mouth Every morning on an empty stomach.), Disp: 30 Tab, Rfl: 2  •  vitamin D (VITAMIND D3) 1000 UNIT Tab, Take 1 Tab by mouth every day., Disp: 60 Tab, Rfl: 0  •  pregabalin (LYRICA) 300 MG capsule, Take 300 mg by mouth 2 times a day., Disp: , Rfl:   •  aspirin EC (ECOTRIN) 81 MG Tablet Delayed Response, Take 81 mg by mouth every day., Disp: , Rfl:   •  potassium chloride SA (KDUR) 20 MEQ Tab CR, Take 20 mEq by mouth 2 times a day., Disp: , Rfl:   •  tiotropium (SPIRIVA) 18 MCG Cap, Inhale 1 Cap by mouth every day., Disp: 90 Cap, Rfl: 3  •  ipratropium-albuterol (DUONEB) 0.5-2.5 (3) MG/3ML nebulizer solution, 3 mL by Nebulization route every 6 hours as needed for Shortness of Breath., Disp: 60 Bullet, Rfl: 1  •  etonogestrel (NEXPLANON) 68 MG Implant implant, Inject 1 Each as instructed Once., Disp: , Rfl:   •  mycophenolate (CELLCEPT) 500 MG tablet, Take 1,000 mg by mouth 2 times a day., Disp: , Rfl:   •  Dulaglutide (TRULICITY) 1.5 MG/0.5ML Solution Pen-injector, Inject 1.5 mg as instructed every Friday., Disp: , Rfl:   •  albuterol 108 (90 Base) MCG/ACT Aero Soln inhalation aerosol, Inhale 2 Puffs by mouth every 6 hours as needed for Shortness of Breath., Disp: , Rfl:   •   "Melatonin 10 MG Tab, Take 10 mg by mouth every evening., Disp: , Rfl:   •  furosemide (LASIX) 80 MG Tab, Take 80 mg by mouth 1 time daily as needed., Disp: , Rfl:     ALLERGIES  Allergies   Allergen Reactions   • Depakote [Divalproex Sodium] Unspecified     Muscle spasms/muscle pain and weakness     • Amitriptyline Unspecified     Headaches     • Aripiprazole [Abilify] Unspecified     Headaches/muscle twitching     • Clindamycin Nausea     Even with food     • Flagyl [Metronidazole Hcl] Unspecified     \"eye problems\"     • Flomax [Tamsulosin Hydrochloride] Swelling   • Levaquin Unspecified     Severe muscle cramps in legs  RXN=unknown   • Metformin Unspecified     Increased lactic acid      • Tape Rash     Tears skin off  coban with Tegaderm tape ok intermittently  RXN=ongoing   • Vancomycin Itching     Pt becomes flushed in face and chest.   RXN=7/10/16   • Wound Dressing Adhesive Hives     By pt report   • Ciprofloxacin    • Hydromorphone Hcl      Pt states she feels loopy   • Keflex Rash     Pt states she gets a rash with this medication  Tolerates ceftriaxone   • Levofloxacin Unspecified     Leg muscle cramps   • Penicillins        PHYSICAL EXAM  VITAL SIGNS: /73   Pulse 76   Temp 37 °C (98.6 °F) (Temporal)   Resp 16   Ht 1.651 m (5' 5\")   Wt 117.9 kg (260 lb)   SpO2 98%   BMI 43.27 kg/m²      Constitutional: Alert in no apparent distress.  HENT: No signs of trauma, Bilateral external ears normal, Nose normal.   Eyes: Pupils are equal and reactive, Conjunctiva normal, Non-icteric.   Neck: Normal range of motion, No tenderness, Supple, No stridor.   Lymphatic: No lymphadenopathy noted.   Cardiovascular: Regular rate and rhythm, no palpable thrill  Thorax & Lungs: No respiratory distress,  No chest tenderness.   Abdomen: Bowel sounds normal, Soft, No tenderness, No masses, No pulsatile masses. No peritoneal signs.  Skin: Warm, Dry, No erythema, No rash.   Back: No bony tenderness, No CVA tenderness. "   Extremities: Intact distal pulses, No edema, No tenderness, No cyanosis.  Musculoskeletal: Good range of motion in all major joints. No tenderness to palpation or major deformities noted.   Neurologic: Alert , Normal motor function, Normal sensory function, No focal deficits noted.   Psychiatric: Affect normal, Judgment normal, Mood normal.     DIAGNOSTIC STUDIES / PROCEDURES    EKG Interpretation:  Interpreted by me  12 Lead EKG interpreted by me to show:  Normal sinus rhythm  Rate 73  Axis: Normal  Intervals: Normal  Normal T waves  Normal ST segments  My impression of this EKG: Does not indicate ischemia or arrhythmia at this time.     RADIOLOGY  DX-CHEST-PORTABLE (1 VIEW)   Final Result         1.  No acute cardiopulmonary disease.         The radiologist's interpretation of all radiological studies have been reviewed by me.    COURSE & MEDICAL DECISION MAKING  Pertinent Labs & Imaging studies reviewed. (See chart for details)    This is a 29 y.o. female that presents with chest pain.  This is very atypical chest pain.  I will get a screening EKG to evaluate for myocardial ischemia as well as a chest x-ray to evaluate for pneumonia pneumothorax..     3:27 AM - Patient seen and examined at bedside. Ordered DX-chest and EKG.      3:59 AM - Patient was reevaluated at bedside. Discussed EKG and radiology results with the patient and informed them they are reassuring. Return precautions were discussed with the patient, and they were cleared for discharge at this time. Patient was understanding and agreeable to discharge.     The patient will return for new or worsening symptoms and is stable at the time of discharge.    Patient has a negative EKG as well as a negative chest x-ray.  I will discharge her home with strict return precautions and follow-up.    The patient is referred to a primary physician for blood pressure management, diabetic screening, and for all other preventative health  concerns.    DISPOSITION:  Patient will be discharged home in stable condition.    FOLLOW UP:  Torres Brody M.D.  [address] [facility] [address]  [facility] [id number] [phone]    In 2 days        OUTPATIENT MEDICATIONS:  New Prescriptions    No medications on file       FINAL IMPRESSION  1. Chest pain, unspecified type          I, Chase Bernal (Scribe), am scribing for, and in the presence of, Noman Zavala M.D..    Electronically signed by: Chase Bernal (Scribe), 7/21/2020    INoman M.D. personally performed the services described in this documentation, as scribed by Chase Bernal in my presence, and it is both accurate and complete. C.    The note accurately reflects work and decisions made by me.  Noman Zavala M.D.  7/21/2020  5:21 AM

## 2020-07-21 NOTE — ED TRIAGE NOTES
"Chief Complaint   Patient presents with   • Chest Pain     Started 12 hours ago while having a BM.      /73   Pulse 76   Temp 37 °C (98.6 °F) (Temporal)   Resp 16   Ht 1.651 m (5' 5\")   Wt 117.9 kg (260 lb)   SpO2 98%   BMI 43.27 kg/m²     PT to ER for above complaint.    PT discharged from [facility] today. Took 325 ASA at home.  "

## 2020-07-22 ENCOUNTER — OFFICE VISIT (OUTPATIENT)
Dept: URGENT CARE | Facility: CLINIC | Age: 30
End: 2020-07-22
Payer: MEDICARE

## 2020-07-22 VITALS
HEART RATE: 98 BPM | SYSTOLIC BLOOD PRESSURE: 120 MMHG | TEMPERATURE: 97.9 F | OXYGEN SATURATION: 96 % | DIASTOLIC BLOOD PRESSURE: 78 MMHG

## 2020-07-22 DIAGNOSIS — R22.0 LEFT FACIAL SWELLING: ICD-10-CM

## 2020-07-22 DIAGNOSIS — H65.91 RIGHT NON-SUPPURATIVE OTITIS MEDIA: ICD-10-CM

## 2020-07-22 LAB
BACTERIA UR CULT: NORMAL
SIGNIFICANT IND 70042: NORMAL
SITE SITE: NORMAL
SOURCE SOURCE: NORMAL

## 2020-07-22 PROCEDURE — 99214 OFFICE O/P EST MOD 30 MIN: CPT | Performed by: PHYSICIAN ASSISTANT

## 2020-07-22 RX ORDER — AZITHROMYCIN 250 MG/1
TABLET, FILM COATED ORAL
Qty: 6 TAB | Refills: 0 | Status: SHIPPED | OUTPATIENT
Start: 2020-07-22 | End: 2020-08-15

## 2020-07-22 RX ORDER — AZITHROMYCIN 250 MG/1
TABLET, FILM COATED ORAL
Qty: 6 TAB | Refills: 0 | Status: SHIPPED | OUTPATIENT
Start: 2020-07-22 | End: 2020-07-22 | Stop reason: SDUPTHER

## 2020-07-22 ASSESSMENT — ENCOUNTER SYMPTOMS
EYE PAIN: 0
PALPITATIONS: 0
CHILLS: 0
FEVER: 0
COUGH: 0
SHORTNESS OF BREATH: 0
SORE THROAT: 0

## 2020-07-23 NOTE — PROGRESS NOTES
Subjective:   Kristin Balderrama is a 29 y.o. female who presents for Neck Swelling (left side, neck, jawline, under eye)      Patient is a 29-year-old female who presents for evaluation of left-sided swelling.  She states that this is been ongoing for 2 days.  She also states that she has some ear discomfort.  She has a history of chronic ear infections.      Review of Systems   Constitutional: Negative for chills and fever.   HENT: Positive for ear pain. Negative for congestion, ear discharge, hearing loss, sore throat and tinnitus.         Facial swelling   Eyes: Negative for pain.   Respiratory: Negative for cough and shortness of breath.    Cardiovascular: Negative for chest pain and palpitations.   All other systems reviewed and are negative.      Medications:    • acetaminophen Tabs  • acetaZOLAMIDE Tabs  • albuterol Aers  • aspirin EC Tbec  • azithromycin Tabs  • busPIRone Tabs  • cefdinir Caps  • Coenzyme Q10 Caps  • Corlanor Tabs  • diphenhydrAMINE Liqd  • Dulaglutide Sopn  • etonogestrel Impl  • fluoxetine  • furosemide Tabs  • gabapentin Caps  • ipratropium-albuterol  • Lantus SoloStar Sopn  • levothyroxine Tabs  • magnesium oxide Tabs  • Melatonin Tabs  • methocarbamol Tabs  • mycophenolate  • Myrbetriq Tb24  • nitrofurantoin Caps  • NON SPECIFIED  • nystatin Crea  • ondansetron Tbdp  • OXcarbazepine Tabs  • potassium chloride SA Tbcr  • predniSONE Tabs  • pregabalin  • prochlorperazine Tabs  • Riboflavin Caps  • Rizatriptan Benzoate Tbdp  • sodium bicarbonate Tabs  • tiotropium Caps  • topiramate Tabs  • traZODone Tabs  • vitamin D Tabs  • ziprasidone Caps    Allergies: Depakote [divalproex sodium]; Amitriptyline; Aripiprazole [abilify]; Clindamycin; Flagyl [metronidazole hcl]; Flomax [tamsulosin hydrochloride]; Levaquin; Metformin; Tape; Vancomycin; Wound dressing adhesive; Ciprofloxacin; Hydromorphone hcl; Keflex; Levofloxacin; and Penicillins    Problem List: Kristin Balderrama has Borderline  personality disorder in adult ([facility]); Knee pain, right; Anxiety; Fatty liver disease, nonalcoholic; H/O prior ablation treatment; Peripheral neuropathy and chornic pain syndrome (CMS-HCC); Morbidly obese ([facility]); Scoliosis; GERD (gastroesophageal reflux disease); Bilateral heel pain secondary to calcaneal bone spurs; Polypharmacy; Galactorrhea; Schizophrenia ([facility]); Bowel and bladder incontinence; Depression; Diabetes mellitus type 2 in obese ([facility]); Hypothyroidism; Functional diarrhea; SVT (supraventricular tachycardia) ([facility]); Optic neuritis; Hypokalemia; Generalized weakness; Immunocompromised ([facility]); History of atrial fibrillation and cardiomyopathy?; Hypocalcemia; Mild intermittent asthma without complication; History of optic neuritis; Stress incontinence; Mixed stress and urge urinary incontinence; Increased frequency of urination; Intracranial hypertension; Schizoaffective disorder, depressive type ([facility]); PTSD (post-traumatic stress disorder); History of supraventricular tachycardia; Type 2 diabetes mellitus without complication, with long-term current use of insulin ([facility]); and Other fatigue on their problem list.    Surgical History:  Past Surgical History:   Procedure Laterality Date   • MUSCLE BIOPSY Right 1/26/2017    Procedure: MUSCLE BIOPSY - THIGH;  Surgeon: Isidro Vigil M.D.;  Location: [facility];  Service:    • GASTROSCOPY WITH BALLOON DILATATION N/A 1/4/2017    Procedure: GASTROSCOPY WITH DILATATION;  Surgeon: Torres Vargas M.D.;  Location: [facility];  Service:    • BOWEL STIMULATOR INSERTION  7/13/2016    Procedure: BOWEL STIMULATOR INSERTION FOR PERMANENT INTERSTIM SACRAL IMPLANT;  Surgeon: Joe Noyola M.D.;  Location: [facility];  Service:    • RECOVERY  1/27/2016    Procedure: CATH LAB EP STUDY WITH SINUS NODE MODIFICATION ABHINAV;  Surgeon: Lina Surgery;  [place] [facility] [facility]  Service:    • OTHER CARDIAC SURGERY  1/2016     cardiac ablation   • NEURO DEST FACET L/S W/IG SNGL  4/21/2015    Performed by Reza Tabor at SUR[facility]ERY [facility]   • LUMBAR FUSION ANTERIOR  8/21/2012    Performed by SUSIE SAWANT at SURGERY [facility]   • ALYSSA BY LAPAROSCOPY  8/29/2010    Performed by SHAYY JOHNS at SURGERY [facility]S   • LAMINOTOMY     • OTHER ABDOMINAL SURGERY     • TONSILLECTOMY      tonsillectomy       Past Social Hx: Kristin Balderrama  reports that she has never smoked. She has never used smokeless tobacco. She reports previous drug use. Frequency: 7.00 times per week. Drug: Marijuana. She reports that she does not drink alcohol.     Past Family Hx:  Kristin Balderrama family history includes Genitourinary () Problems in her sister; Heart Disease in her maternal grandmother and mother; Hypertension in her maternal grandmother, maternal uncle, and mother; No Known Problems in her sister; Other in her mother and sister; Sleep Apnea in her mother.     Problem list, medications, and allergies reviewed by myself today in Epic.     Objective:     Blood Pressure 120/78   Pulse 98   Temperature 36.6 °C (97.9 °F) (Temporal)   Oxygen Saturation 96%     Physical Exam  Vitals signs reviewed.   Constitutional:       General: She is not in acute distress.     Appearance: She is well-developed. She is not ill-appearing or toxic-appearing.   HENT:      Head: Normocephalic and atraumatic.      Right Ear: Hearing, ear canal and external ear normal. No drainage. No mastoid tenderness. Tympanic membrane is injected.      Left Ear: Hearing, tympanic membrane, ear canal and external ear normal. No drainage. No mastoid tenderness.      Nose: Nose normal.      Mouth/Throat:      Pharynx: Uvula midline. No oropharyngeal exudate.   Eyes:      General: Lids are normal.      Conjunctiva/sclera: Conjunctivae normal.   Neck:      Musculoskeletal: Full passive range of motion without pain, normal range of motion and neck supple.    Cardiovascular:      Rate and Rhythm: Regular rhythm.      Heart sounds: Normal heart sounds. No murmur. No friction rub. No gallop.    Pulmonary:      Effort: Pulmonary effort is normal. No respiratory distress.      Breath sounds: Normal breath sounds. No decreased breath sounds, wheezing, rhonchi or rales.   Chest:      Chest wall: No tenderness.   Musculoskeletal: Normal range of motion.   Lymphadenopathy:      Cervical: Cervical adenopathy present.   Skin:     General: Skin is warm and dry.   Neurological:      Mental Status: She is alert and oriented to person, place, and time.      Cranial Nerves: No cranial nerve deficit.   Psychiatric:         Speech: Speech normal.         Behavior: Behavior normal.         Thought Content: Thought content normal.         Judgment: Judgment normal.         Assessment/Plan:     Medical Decision Making/Comments   Patient is a 29-year-old female with subjective swelling of the left face.  On exam there is no significant swelling felt mild lymphadenopathy.  The right TM is mildly injected.  Discussed symptoms are likely a result of a viral process.  Contingent antibiotic prescription given to patient to fill upon meeting criteria of guidelines discussed.        Diagnosis and associated orders     1. Left facial swelling  azithromycin (ZITHROMAX) 250 MG Tab    DISCONTINUED: azithromycin (ZITHROMAX) 250 MG Tab   2. Right non-suppurative otitis media                Differential diagnosis, natural history, supportive care, and indications for immediate follow-up discussed.    Advised the patient to follow-up with the primary care physician for recheck, reevaluation, and consideration of further management.    Please note that this dictation was created using voice recognition software. I have made a reasonable attempt to correct obvious errors, but I expect that there are errors of grammar and possibly content that I did not discover before finalizing the note.

## 2020-07-24 ENCOUNTER — HOSPITAL ENCOUNTER (EMERGENCY)
Facility: MEDICAL CENTER | Age: 30
End: 2020-07-24
Attending: EMERGENCY MEDICINE
Payer: MEDICARE

## 2020-07-24 ENCOUNTER — APPOINTMENT (OUTPATIENT)
Dept: RADIOLOGY | Facility: MEDICAL CENTER | Age: 30
End: 2020-07-24
Attending: EMERGENCY MEDICINE
Payer: MEDICARE

## 2020-07-24 VITALS
DIASTOLIC BLOOD PRESSURE: 79 MMHG | TEMPERATURE: 98.3 F | HEART RATE: 80 BPM | BODY MASS INDEX: 43.32 KG/M2 | RESPIRATION RATE: 18 BRPM | SYSTOLIC BLOOD PRESSURE: 137 MMHG | WEIGHT: 260 LBS | OXYGEN SATURATION: 97 % | HEIGHT: 65 IN

## 2020-07-24 DIAGNOSIS — R06.00 DYSPNEA, UNSPECIFIED TYPE: ICD-10-CM

## 2020-07-24 LAB
ALBUMIN SERPL BCP-MCNC: 4.4 G/DL (ref 3.2–4.9)
ALBUMIN/GLOB SERPL: 2.6 G/DL
ALP SERPL-CCNC: 46 U/L (ref 30–99)
ALT SERPL-CCNC: 28 U/L (ref 2–50)
ANION GAP SERPL CALC-SCNC: 14 MMOL/L (ref 7–16)
APPEARANCE UR: ABNORMAL
AST SERPL-CCNC: 11 U/L (ref 12–45)
BACTERIA #/AREA URNS HPF: NEGATIVE /HPF
BASOPHILS # BLD AUTO: 0.5 % (ref 0–1.8)
BASOPHILS # BLD: 0.03 K/UL (ref 0–0.12)
BILIRUB SERPL-MCNC: <0.2 MG/DL (ref 0.1–1.5)
BILIRUB UR QL STRIP.AUTO: ABNORMAL
BUN SERPL-MCNC: 11 MG/DL (ref 8–22)
CALCIUM SERPL-MCNC: 8.9 MG/DL (ref 8.5–10.5)
CAOX CRY #/AREA URNS HPF: ABNORMAL /HPF
CHLORIDE SERPL-SCNC: 112 MMOL/L (ref 96–112)
CO2 SERPL-SCNC: 18 MMOL/L (ref 20–33)
COLOR UR: ABNORMAL
CREAT SERPL-MCNC: 0.94 MG/DL (ref 0.5–1.4)
EKG IMPRESSION: NORMAL
EOSINOPHIL # BLD AUTO: 0.05 K/UL (ref 0–0.51)
EOSINOPHIL NFR BLD: 0.8 % (ref 0–6.9)
EPI CELLS #/AREA URNS HPF: ABNORMAL /HPF
ERYTHROCYTE [DISTWIDTH] IN BLOOD BY AUTOMATED COUNT: 46.6 FL (ref 35.9–50)
GLOBULIN SER CALC-MCNC: 1.7 G/DL (ref 1.9–3.5)
GLUCOSE SERPL-MCNC: 98 MG/DL (ref 65–99)
GLUCOSE UR STRIP.AUTO-MCNC: NEGATIVE MG/DL
HCG SERPL QL: NEGATIVE
HCT VFR BLD AUTO: 43.6 % (ref 37–47)
HGB BLD-MCNC: 13.9 G/DL (ref 12–16)
HYALINE CASTS #/AREA URNS LPF: ABNORMAL /LPF
IMM GRANULOCYTES # BLD AUTO: 0.05 K/UL (ref 0–0.11)
IMM GRANULOCYTES NFR BLD AUTO: 0.8 % (ref 0–0.9)
KETONES UR STRIP.AUTO-MCNC: NEGATIVE MG/DL
LACTATE BLD-SCNC: 2.7 MMOL/L (ref 0.5–2)
LEUKOCYTE ESTERASE UR QL STRIP.AUTO: NEGATIVE
LIPASE SERPL-CCNC: 47 U/L (ref 11–82)
LYMPHOCYTES # BLD AUTO: 1.81 K/UL (ref 1–4.8)
LYMPHOCYTES NFR BLD: 29 % (ref 22–41)
MCH RBC QN AUTO: 30 PG (ref 27–33)
MCHC RBC AUTO-ENTMCNC: 31.9 G/DL (ref 33.6–35)
MCV RBC AUTO: 94 FL (ref 81.4–97.8)
MICRO URNS: ABNORMAL
MONOCYTES # BLD AUTO: 0.45 K/UL (ref 0–0.85)
MONOCYTES NFR BLD AUTO: 7.2 % (ref 0–13.4)
NEUTROPHILS # BLD AUTO: 3.85 K/UL (ref 2–7.15)
NEUTROPHILS NFR BLD: 61.7 % (ref 44–72)
NITRITE UR QL STRIP.AUTO: NEGATIVE
NRBC # BLD AUTO: 0 K/UL
NRBC BLD-RTO: 0 /100 WBC
NT-PROBNP SERPL IA-MCNC: 82 PG/ML (ref 0–125)
PH UR STRIP.AUTO: 6.5 [PH] (ref 5–8)
PLATELET # BLD AUTO: 153 K/UL (ref 164–446)
PMV BLD AUTO: 11 FL (ref 9–12.9)
POTASSIUM SERPL-SCNC: 3.1 MMOL/L (ref 3.6–5.5)
PROT SERPL-MCNC: 6.1 G/DL (ref 6–8.2)
PROT UR QL STRIP: 100 MG/DL
RBC # BLD AUTO: 4.64 M/UL (ref 4.2–5.4)
RBC # URNS HPF: ABNORMAL /HPF
RBC UR QL AUTO: ABNORMAL
S PYO DNA SPEC NAA+PROBE: NOT DETECTED
SODIUM SERPL-SCNC: 144 MMOL/L (ref 135–145)
SP GR UR STRIP.AUTO: 1.02
TROPONIN T SERPL-MCNC: <6 NG/L (ref 6–19)
UROBILINOGEN UR STRIP.AUTO-MCNC: 0.2 MG/DL
WBC # BLD AUTO: 6.2 K/UL (ref 4.8–10.8)
WBC #/AREA URNS HPF: ABNORMAL /HPF

## 2020-07-24 PROCEDURE — 83690 ASSAY OF LIPASE: CPT

## 2020-07-24 PROCEDURE — 85025 COMPLETE CBC W/AUTO DIFF WBC: CPT

## 2020-07-24 PROCEDURE — 83605 ASSAY OF LACTIC ACID: CPT

## 2020-07-24 PROCEDURE — 96374 THER/PROPH/DIAG INJ IV PUSH: CPT

## 2020-07-24 PROCEDURE — 93005 ELECTROCARDIOGRAM TRACING: CPT | Performed by: EMERGENCY MEDICINE

## 2020-07-24 PROCEDURE — 700105 HCHG RX REV CODE 258: Performed by: EMERGENCY MEDICINE

## 2020-07-24 PROCEDURE — 84484 ASSAY OF TROPONIN QUANT: CPT

## 2020-07-24 PROCEDURE — 71045 X-RAY EXAM CHEST 1 VIEW: CPT

## 2020-07-24 PROCEDURE — A9270 NON-COVERED ITEM OR SERVICE: HCPCS | Performed by: EMERGENCY MEDICINE

## 2020-07-24 PROCEDURE — 36415 COLL VENOUS BLD VENIPUNCTURE: CPT

## 2020-07-24 PROCEDURE — 87651 STREP A DNA AMP PROBE: CPT

## 2020-07-24 PROCEDURE — 74177 CT ABD & PELVIS W/CONTRAST: CPT

## 2020-07-24 PROCEDURE — 81001 URINALYSIS AUTO W/SCOPE: CPT

## 2020-07-24 PROCEDURE — 99285 EMERGENCY DEPT VISIT HI MDM: CPT

## 2020-07-24 PROCEDURE — 700102 HCHG RX REV CODE 250 W/ 637 OVERRIDE(OP): Performed by: EMERGENCY MEDICINE

## 2020-07-24 PROCEDURE — 80053 COMPREHEN METABOLIC PANEL: CPT

## 2020-07-24 PROCEDURE — 84703 CHORIONIC GONADOTROPIN ASSAY: CPT

## 2020-07-24 PROCEDURE — 83880 ASSAY OF NATRIURETIC PEPTIDE: CPT

## 2020-07-24 PROCEDURE — 700117 HCHG RX CONTRAST REV CODE 255: Performed by: EMERGENCY MEDICINE

## 2020-07-24 PROCEDURE — 700111 HCHG RX REV CODE 636 W/ 250 OVERRIDE (IP): Performed by: EMERGENCY MEDICINE

## 2020-07-24 RX ORDER — POTASSIUM CHLORIDE 20 MEQ/1
40 TABLET, EXTENDED RELEASE ORAL ONCE
Status: COMPLETED | OUTPATIENT
Start: 2020-07-24 | End: 2020-07-24

## 2020-07-24 RX ORDER — SODIUM CHLORIDE 9 MG/ML
1000 INJECTION, SOLUTION INTRAVENOUS ONCE
Status: COMPLETED | OUTPATIENT
Start: 2020-07-24 | End: 2020-07-24

## 2020-07-24 RX ORDER — MORPHINE SULFATE 4 MG/ML
4 INJECTION, SOLUTION INTRAMUSCULAR; INTRAVENOUS ONCE
Status: COMPLETED | OUTPATIENT
Start: 2020-07-24 | End: 2020-07-24

## 2020-07-24 RX ADMIN — POTASSIUM CHLORIDE 40 MEQ: 1500 TABLET, EXTENDED RELEASE ORAL at 07:03

## 2020-07-24 RX ADMIN — IOHEXOL 100 ML: 350 INJECTION, SOLUTION INTRAVENOUS at 07:32

## 2020-07-24 RX ADMIN — SODIUM CHLORIDE 1000 ML: 9 INJECTION, SOLUTION INTRAVENOUS at 06:15

## 2020-07-24 RX ADMIN — MORPHINE SULFATE 4 MG: 4 INJECTION INTRAVENOUS at 06:09

## 2020-07-24 ASSESSMENT — FIBROSIS 4 INDEX: FIB4 SCORE: 0.28

## 2020-07-24 NOTE — ED NOTES
Pt left ED via wheel chair, a/o x 4, GCS 15, no c/o. Pt ambulates short distances without assistance. PIV 20 removed, pt given discharge instructions and verbally acknowledged d/c instructions w/ understanding. Pt mother arrived with personal vehicle and picked pt up.

## 2020-07-24 NOTE — ED PROVIDER NOTES
"ED Provider Note    CHIEF COMPLAINT  Chief Complaint   Patient presents with   • Shortness of Breath     x 3 hours. per ems, patient was picked up at home where she lives with her mother and grandma. patient was admitted 3 days ago for chest pain. since her visit 3 days ago, the patient states she has had worsening \"neck swelling\". spo2 on room air 98%       HPI  Kristin Balderrama is a 29 y.o. female who presents with multiple complaints including shortness of breath, chest pain, abdominal pain, dysuria, dark and bloody urine, nausea, vomiting, neck swelling, and sore throat.  She notes that the abdominal pain, dysuria, dark and bloody urine, nausea, and vomiting started 3 days ago.  3 hours ago, she developed nonradiating substernal chest pain and shortness of breath.  She has not tried any medication for her symptoms.  She notes being seen here multiple times in the recent past and is currently attempting to get placed in a group home.  She denies any fevers or diarrhea    REVIEW OF SYSTEMS  See HPI for further details.  Shortness of breath.  Chest pain.  Abdominal pain.  Dysuria.  Dark and bloody urine.  Nausea.  Vomiting.  .  Sore throat.  All other systems are negative.     PAST MEDICAL HISTORY   has a past medical history of Abdominal pain, Anginal syndrome, Apnea, sleep, Arrhythmia, Arthritis, ASTHMA, Atrial fibrillation (HCC), Back pain, Borderline personality disorder (HCC), Breath shortness, Bronchitis, Cardiac arrhythmia, Chickenpox, Chronic UTI (9/18/2010), Cough, Daytime sleepiness, Depression, Diabetes (HCC), Diarrhea, Disorder of thyroid, Fall, Fatigue, Frequent headaches, Gasping for breath, Gynecological disorder, Headache(784.0), Heart burn, History of falling, Hypertension, Indigestion, Migraine, 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HCC), 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HCC), Nausea, Obesity, Other fatigue (6/29/2020), Pain (08-15-12), Painful joint, PCOS (polycystic ovarian syndrome), Pneumonia (2012), " Psychosis (HCC), Renal disorder, Ringing in ears, Scoliosis, Shortness of breath, Sinus tachycardia (10/31/2013), Sleep apnea, Snoring, Tonsillitis, Tuberculosis, Urinary bladder disorder, Urinary incontinence, Weakness, and Wears glasses.    SOCIAL HISTORY  Social History     Tobacco Use   • Smoking status: Never Smoker   • Smokeless tobacco: Never Used   Substance and Sexual Activity   • Alcohol use: No     Alcohol/week: 0.0 oz   • Drug use: Not Currently     Frequency: 7.0 times per week     Types: Marijuana   • Sexual activity: Not Currently     Birth control/protection: Pill       SURGICAL HISTORY   has a past surgical history that includes neuro dest facet l/s w/ig sngl (4/21/2015); recovery (1/27/2016); [place] by laparoscopy (8/29/2010); lumbar fusion anterior (8/21/2012); other cardiac surgery (1/2016); tonsillectomy; bowel stimulator insertion (7/13/2016); gastroscopy with balloon dilatation (N/A, 1/4/2017); muscle biopsy (Right, 1/26/2017); other abdominal surgery; and laminotomy.    CURRENT MEDICATIONS  Home Medications     Reviewed by Ayaka Valentine R.N. (Registered Nurse) on 07/24/20 at 0517  Med List Status: Unable to Obtain   Medication Last Dose Status   acetaminophen (TYLENOL) 500 MG Tab  Active   acetaZOLAMIDE (DIAMOX) 250 MG Tab  Active   albuterol 108 (90 Base) MCG/ACT Aero Soln inhalation aerosol  Active   aspirin EC (ECOTRIN) 81 MG Tablet Delayed Response  Active   azithromycin (ZITHROMAX) 250 MG Tab  Active   busPIRone (BUSPAR) 10 MG Tab tablet  Active   cefdinir (OMNICEF) 300 MG Cap  Active   Coenzyme Q10 300 MG Cap  Active   diphenhydrAMINE (BENADRYL) 12.5 MG/5ML Liquid liquid  Active   Dulaglutide (TRULICITY) 1.5 MG/0.5ML Solution Pen-injector  Active   etonogestrel (NEXPLANON) 68 MG Implant implant  Active   fluoxetine (PROZAC) 40 MG capsule  Active   furosemide (LASIX) 80 MG Tab  Active   gabapentin (NEURONTIN) 300 MG Cap  Active   insulin glargine (LANTUS SOLOSTAR) 100 UNIT/ML Solution  "Pen-injector injection  Active   ipratropium-albuterol (DUONEB) 0.5-2.5 (3) MG/3ML nebulizer solution  Active   Ivabradine HCl (CORLANOR) 7.5 MG Tab  Active   levothyroxine (SYNTHROID) 100 MCG Tab  Active   magnesium oxide (MAG-OX) 400 MG Tab tablet  Active   Melatonin 10 MG Tab  Active   methocarbamol (ROBAXIN) 750 MG Tab  Active   Mirabegron ER (MYRBETRIQ) 50 MG TABLET SR 24 HR  Active   mycophenolate (CELLCEPT) 500 MG tablet  Active   nitrofurantoin (MACRODANTIN) 50 MG Cap  Active   NON SPECIFIED  Active   nystatin (MYCOSTATIN) 439119 UNIT/GM Cream topical cream  Active   ondansetron (ZOFRAN ODT) 4 MG TABLET DISPERSIBLE  Active   OXcarbazepine (TRILEPTAL) 150 MG Tab  Active   potassium chloride SA (KDUR) 20 MEQ Tab CR  Active   predniSONE (DELTASONE) 20 MG Tab  Active   pregabalin (LYRICA) 300 MG capsule  Active   prochlorperazine (COMPAZINE) 10 MG Tab  Active   Riboflavin 400 MG Cap  Active   Rizatriptan Benzoate (MAXALT-MLT) 5 MG TABLET DISPERSIBLE  Active   sodium bicarbonate (SODIUM BICARBONATE) 650 MG Tab  Active   tiotropium (SPIRIVA) 18 MCG Cap  Active   topiramate (TOPAMAX) 25 MG Tab  Active   traZODone (DESYREL) 100 MG Tab  Active   vitamin D (VITAMIND D3) 1000 UNIT Tab  Active   ziprasidone (GEODON) 40 MG Cap  Active                ALLERGIES  Allergies   Allergen Reactions   • Depakote [Divalproex Sodium] Unspecified     Muscle spasms/muscle pain and weakness     • Amitriptyline Unspecified     Headaches     • Aripiprazole [Abilify] Unspecified     Headaches/muscle twitching     • Clindamycin Nausea     Even with food     • Flagyl [Metronidazole Hcl] Unspecified     \"eye problems\"     • Flomax [Tamsulosin Hydrochloride] Swelling   • Levaquin Unspecified     Severe muscle cramps in legs  RXN=unknown   • Metformin Unspecified     Increased lactic acid      • Tape Rash     Tears skin off  coban with Tegaderm tape ok intermittently  RXN=ongoing   • Vancomycin Itching     Pt becomes flushed in face and " "chest.   RXN=7/10/16   • Wound Dressing Adhesive Hives     By pt report   • Ciprofloxacin    • Hydromorphone Hcl      Pt states she feels loopy   • Keflex Rash     Pt states she gets a rash with this medication  Tolerates ceftriaxone   • Levofloxacin Unspecified     Leg muscle cramps   • Penicillins        PHYSICAL EXAM  VITAL SIGNS: /76   Pulse 92   Temp 36.8 °C (98.3 °F) (Temporal)   Resp 18   Ht 1.651 m (5' 5\")   Wt 117.9 kg (260 lb)   SpO2 98%   BMI 43.27 kg/m²    Pulse ox interpretation: I interpret this pulse ox as normal.  Constitutional: Alert in no apparent distress.  HENT: No signs of trauma, Bilateral external ears normal, Nose normal.  Dry mucous membranes.  Expiratory stridor which is distractible.  Eyes: Pupils are equal and reactive, Conjunctiva normal, Non-icteric.   Neck: Normal range of motion, tender anterior cervical lymphadenopathy, Supple, No stridor.   Lymphatic: No lymphadenopathy noted.   Cardiovascular: Regular rate and rhythm, no murmurs.   Thorax & Lungs: Normal breath sounds, No respiratory distress, No wheezing, no stridor during pulmonary exam, no chest tenderness.   Abdomen: Bowel sounds normal, Soft, diffuse tenderness, No masses, No pulsatile masses. No peritoneal signs.  : Andrade catheter in place draining dark urine.  Skin: Warm, Dry, No erythema, No rash.   Back: Normal alignment.  Extremities: Intact distal pulses, No edema, No tenderness, No cyanosis.  Musculoskeletal: Good range of motion in all major joints. No tenderness to palpation or major deformities noted.   Neurologic: Alert, Normal motor function, Normal sensory function, No focal deficits noted.   Psychiatric: Affect normal, Judgment normal, Mood normal.     DIAGNOSTIC STUDIES / PROCEDURES    EKG  Results for orders placed or performed during the hospital encounter of 07/24/20   EKG   Result Value Ref Range    Report       [facility] Emergency Dept.    Test Date:  2020-07-24  Pt " Name:    HARLEY DE LA CRUZ              Department: ER  MRN:        [id number]                      Room:       RD 02  Gender:     Female                       Technician: [id number]  :        1990                   Requested By:MINAL SHORE  Order #:    [id number]                    Reading MD: Minal Shore MD    Measurements  Intervals                                Axis  Rate:       83                           P:          6  AL:         140                          QRS:        3  QRSD:       92                           T:          -64  QT:         308  QTc:        362    Interpretive Statements  SINUS RHYTHM  BORDERLINE T ABNORMALITIES, INFERIOR LEADS  Compared to ECG 2020 03:26:26  Prolonged QT interval no longer present  T-wave abnormality still present  Electronically Signed On 2020 6:12:44 PDT by Minal Shore MD       *Note: Due to a large number of results and/or encounters for the requested time period, some results have not been displayed. A complete set of results can be found in Results Review.     LABS  CBC  CMP  Troponin  BNP  Lipase  Lactic acid  hCG  Group A strep    RADIOLOGY  Chest x-ray  CT abdomen pelvis    COURSE & MEDICAL DECISION MAKING  Pertinent Labs & Imaging studies reviewed. (See chart for details)  Records obtained and reviewed: Patient was seen in this facility 3 days ago for chest pain.  EKG, chest x-ray were normal.  2 days prior to that she was seen in the ER for a urinary catheter problem which was characterized as decreased output and pain.  She had the urinary catheter placed due to urinary retention.  Nursing staff replaced the catheter with subsequent urine output.  There was some leakage around the catheter but the patient noted that her discomfort was improved.  She was instructed to follow-up with Dr. Rosenbaum.  She was seen the day before that in this ER for ground-level fall due to a syncopal episode.  No emergent etiology was found for her symptoms and she was  discharged.  She was seen prior to this 11 days ago for shortness of breath.  She had apparently been seen at [facility] the day before for chest pain.  A CT scan was performed with IV contrast which revealed some narrowing at the level of the vocal cords without any obvious surrounding edema or focal masses.  It is noted that she was treated with a little bit of morphine and her stridor resolved immediately.  Apparently she has followed with psychiatry for potential conversion disorder.    This is a medically complicated female well-known to this emergency department (this is her 19th visit to this ER this year) who is here with multiple complaints as detailed above.  It is difficult to link all of the complaints together given the extensive nature but certainly ACS, infection, COVID-19, viral syndrome, PE are possibilities.  She does arrive afebrile with normal vital signs and appears nontoxic.  Her mucous membranes are dry suggesting dehydration and IV fluids were started.  She has expiratory stridor which appears to be distractible as during the pulmonary exam she is breathing quite easily and there are no wheezes or stridor at all.  I did look back through several of her ER visits and 11 days ago she was seen for a similar pulmonary complaint during which time it was noted that she had stridor as well.  She is apparently frequenting other emergency departments in our local area for similar complaints.  During her previous visit she was given morphine and her symptoms improved.  I did trial a dose of morphine for her here today and again her stridor disappeared.  I do note that she has some focal narrowing in her trachea but she has been seen in this emergency department multiple times over the past week and there has been no discussion of stridor.  Although she does have a physiologic abnormality I think this can be followed up as an outpatient with ENT and is unlikely to be the source of her  symptoms today.    She has no leukocytosis or left shift.  Metabolic panel reveals mild hypokalemia to 3.1 with a slightly low CO2 of 18.  Potassium was repleted orally.  Lipase is normal and her lactic acid is slightly elevated which I suspect is due to dehydration given her dry mucous membranes.  Troponin and BNP are both normal.  hCG is negative.  Chest x-ray is without acute abnormality.    Given the patient's abdominal pain and reports of nausea and vomiting I elected to pursue CT scan.    HEART score: 3    Low suspicion for PE with O2 sats in the high 90s and normal heart rate.    Patient care transferred to my partner pending return of CT abdomen/pelvis as well as urinalysis. Her BP and HR remain normal. Her O2 sats are still in the high 90s on room air. She does not require admission based on initial results.     HYDRATION  HYDRATION: Based on the patient's presentation of Dehydration the patient was given IV fluids. IV Hydration was used because oral hydration was not adequate alone. Upon recheck following hydration, the patient was Improved.    FINAL IMPRESSION  1.  Shortness of breath  2.  Abdominal pain  3.  Hypokalemia     Electronically signed by: Dino Morales M.D., 7/24/2020 5:18 AM

## 2020-07-24 NOTE — ED TRIAGE NOTES
"Chief Complaint   Patient presents with   • Shortness of Breath     x 3 hours. per ems, patient was picked up at home where she lives with her mother and grandma. patient was admitted 3 days ago for chest pain. since her visit 3 days ago, the patient states she has had worsening \"neck swelling\". spo2 on room air 98%     Patient hooked up to monitors upon arrival to [facility]. VSS.   "

## 2020-07-24 NOTE — DISCHARGE INSTRUCTIONS
You were seen in the ER for shortness of breath and chest pain as well as abdominal pain.  Your labs and imaging did not reveal an acute abnormality that requires further work-up, consultation, or admission to the hospital.  You are safe to go home.  I recommend that you follow-up with your primary care physician within the next 24 to 48 hours for recheck.  I am also giving you the contact information for our on-call Ear Nose and Throat doctor (ENT) as you will need to follow-up as an outpatient for a narrowing in your windpipe that is unlikely to be contributing to your visit to the ER today.  Continue to take all of your medications as directed.  Return to the ER with new or worsening symptoms.  Good luck, I hope you feel better soon!

## 2020-07-25 ENCOUNTER — APPOINTMENT (OUTPATIENT)
Dept: RADIOLOGY | Facility: MEDICAL CENTER | Age: 30
End: 2020-07-25
Attending: EMERGENCY MEDICINE
Payer: MEDICARE

## 2020-07-25 ENCOUNTER — HOSPITAL ENCOUNTER (EMERGENCY)
Facility: MEDICAL CENTER | Age: 30
End: 2020-07-25
Attending: EMERGENCY MEDICINE
Payer: MEDICARE

## 2020-07-25 VITALS
WEIGHT: 260 LBS | SYSTOLIC BLOOD PRESSURE: 140 MMHG | BODY MASS INDEX: 43.32 KG/M2 | OXYGEN SATURATION: 99 % | HEIGHT: 65 IN | RESPIRATION RATE: 22 BRPM | DIASTOLIC BLOOD PRESSURE: 83 MMHG | HEART RATE: 74 BPM | TEMPERATURE: 97.8 F

## 2020-07-25 DIAGNOSIS — S63.501A SPRAIN OF RIGHT WRIST, INITIAL ENCOUNTER: ICD-10-CM
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
## 2020-07-25 DIAGNOSIS — H53.9 VISUAL DISTURBANCE: ICD-10-CM

## 2020-07-25 DIAGNOSIS — R55 SYNCOPE, UNSPECIFIED SYNCOPE TYPE: ICD-10-CM

## 2020-07-25 DIAGNOSIS — S00.83XA CONTUSION OF FACE, INITIAL ENCOUNTER: ICD-10-CM

## 2020-07-25 LAB
ANION GAP SERPL CALC-SCNC: 14 MMOL/L (ref 7–16)
BASOPHILS # BLD AUTO: 0.2 % (ref 0–1.8)
BASOPHILS # BLD: 0.01 K/UL (ref 0–0.12)
BUN SERPL-MCNC: 7 MG/DL (ref 8–22)
CALCIUM SERPL-MCNC: 8.8 MG/DL (ref 8.5–10.5)
CHLORIDE SERPL-SCNC: 113 MMOL/L (ref 96–112)
CO2 SERPL-SCNC: 13 MMOL/L (ref 20–33)
CREAT SERPL-MCNC: 0.85 MG/DL (ref 0.5–1.4)
EKG IMPRESSION: NORMAL
EOSINOPHIL # BLD AUTO: 0.06 K/UL (ref 0–0.51)
EOSINOPHIL NFR BLD: 1.2 % (ref 0–6.9)
ERYTHROCYTE [DISTWIDTH] IN BLOOD BY AUTOMATED COUNT: 47.9 FL (ref 35.9–50)
GLUCOSE SERPL-MCNC: 118 MG/DL (ref 65–99)
HCT VFR BLD AUTO: 43.1 % (ref 37–47)
HGB BLD-MCNC: 13.3 G/DL (ref 12–16)
IMM GRANULOCYTES # BLD AUTO: 0.02 K/UL (ref 0–0.11)
IMM GRANULOCYTES NFR BLD AUTO: 0.4 % (ref 0–0.9)
LYMPHOCYTES # BLD AUTO: 1.06 K/UL (ref 1–4.8)
LYMPHOCYTES NFR BLD: 20.7 % (ref 22–41)
MCH RBC QN AUTO: 29.6 PG (ref 27–33)
MCHC RBC AUTO-ENTMCNC: 30.9 G/DL (ref 33.6–35)
MCV RBC AUTO: 95.8 FL (ref 81.4–97.8)
MONOCYTES # BLD AUTO: 0.33 K/UL (ref 0–0.85)
MONOCYTES NFR BLD AUTO: 6.5 % (ref 0–13.4)
NEUTROPHILS # BLD AUTO: 3.63 K/UL (ref 2–7.15)
NEUTROPHILS NFR BLD: 71 % (ref 44–72)
NRBC # BLD AUTO: 0 K/UL
NRBC BLD-RTO: 0 /100 WBC
PLATELET # BLD AUTO: 149 K/UL (ref 164–446)
PMV BLD AUTO: 11.3 FL (ref 9–12.9)
POTASSIUM SERPL-SCNC: 3.7 MMOL/L (ref 3.6–5.5)
RBC # BLD AUTO: 4.5 M/UL (ref 4.2–5.4)
SODIUM SERPL-SCNC: 140 MMOL/L (ref 135–145)
WBC # BLD AUTO: 5.1 K/UL (ref 4.8–10.8)

## 2020-07-25 PROCEDURE — 96375 TX/PRO/DX INJ NEW DRUG ADDON: CPT

## 2020-07-25 PROCEDURE — 80048 BASIC METABOLIC PNL TOTAL CA: CPT

## 2020-07-25 PROCEDURE — 96374 THER/PROPH/DIAG INJ IV PUSH: CPT

## 2020-07-25 PROCEDURE — 73110 X-RAY EXAM OF WRIST: CPT | Mod: RT

## 2020-07-25 PROCEDURE — 85025 COMPLETE CBC W/AUTO DIFF WBC: CPT

## 2020-07-25 PROCEDURE — 700111 HCHG RX REV CODE 636 W/ 250 OVERRIDE (IP): Performed by: EMERGENCY MEDICINE

## 2020-07-25 PROCEDURE — A9270 NON-COVERED ITEM OR SERVICE: HCPCS | Performed by: EMERGENCY MEDICINE

## 2020-07-25 PROCEDURE — 700102 HCHG RX REV CODE 250 W/ 637 OVERRIDE(OP): Performed by: EMERGENCY MEDICINE

## 2020-07-25 PROCEDURE — 70486 CT MAXILLOFACIAL W/O DYE: CPT

## 2020-07-25 PROCEDURE — 93005 ELECTROCARDIOGRAM TRACING: CPT | Performed by: EMERGENCY MEDICINE

## 2020-07-25 PROCEDURE — 99285 EMERGENCY DEPT VISIT HI MDM: CPT

## 2020-07-25 PROCEDURE — 36415 COLL VENOUS BLD VENIPUNCTURE: CPT

## 2020-07-25 RX ORDER — ACETAMINOPHEN 500 MG
1000 TABLET ORAL ONCE
Status: COMPLETED | OUTPATIENT
Start: 2020-07-25 | End: 2020-07-25

## 2020-07-25 RX ORDER — KETOROLAC TROMETHAMINE 30 MG/ML
30 INJECTION, SOLUTION INTRAMUSCULAR; INTRAVENOUS ONCE
Status: COMPLETED | OUTPATIENT
Start: 2020-07-25 | End: 2020-07-25

## 2020-07-25 RX ORDER — ONDANSETRON 2 MG/ML
4 INJECTION INTRAMUSCULAR; INTRAVENOUS ONCE
Status: COMPLETED | OUTPATIENT
Start: 2020-07-25 | End: 2020-07-25

## 2020-07-25 RX ADMIN — KETOROLAC TROMETHAMINE 30 MG: 30 INJECTION, SOLUTION INTRAMUSCULAR at 19:01

## 2020-07-25 RX ADMIN — ONDANSETRON 4 MG: 2 INJECTION INTRAMUSCULAR; INTRAVENOUS at 19:01

## 2020-07-25 RX ADMIN — ACETAMINOPHEN 1000 MG: 500 TABLET ORAL at 21:16

## 2020-07-25 ASSESSMENT — FIBROSIS 4 INDEX: FIB4 SCORE: 0.41

## 2020-07-25 ASSESSMENT — LIFESTYLE VARIABLES: DO YOU DRINK ALCOHOL: NO

## 2020-07-26 NOTE — ED NOTES
Chief Complaint   Patient presents with   • Syncope   • Sore Throat   • Eye Pain     since 11am    • Dizziness     Pt bib ems , per report pt was sitting commode and had syncopal episode witnessed. Pt hit right side of her face.   fsbs 121

## 2020-07-26 NOTE — ED PROVIDER NOTES
"ED Provider Note     Scribed for Sonal Bryant D.O. by Froilan Rao. 7/25/2020, 6:28 PM.     Primary care provider: Torres Brody M.D.  Means of arrival: EMS         History obtained from: Patient  History limited by: None    CHIEF COMPLAINT  Chief Complaint   Patient presents with   • Syncope   • Sore Throat   • Eye Pain     since 11am    • Dizziness       HPI  Kristin Balderrama is a 29 y.o. female with complex medical history who presents to the emergency Department for evaluation after a syncopal episode. The patient states that she \"seizes up, starts to shake, then falls forward\". The patient was found by her grandmother on the floor of her bedroom, next to her wheelchair. Patient is unsure if she hit her head, but noted that a sticky clear fluid came out of her right ear. She states that she was here last month for the same reason and was discharged and told that she had a concussion. Patient notes that she has had visual changes since her last concussion. She describes it as double vision as well as seeing \"sparkles\". At this time the patient endorses right wrist pain and headache, but denies any fever or cough. Patient currently has a catheter placed for urinary retention. Patient is followed by Dr. Jernigan and Dr. Yousif. No exacerbating or alleviating factors were stated.     REVIEW OF SYSTEMS  Pertinent positives include syncopal episode, visual changes, right wrist pain, and headache. Pertinent negatives include no fever or cough.   See HPI for further details. All other systems are negative.    PAST MEDICAL HISTORY  Past Medical History:   Diagnosis Date   • Abdominal pain    • Anginal syndrome     random chest pain especially with tachycardia   • Apnea, sleep    • Arrhythmia     \"sinus tachycardia\", cariologist, Dr. Kumar; ablation 2/2016   • Arthritis     osteo   • ASTHMA     when around smoke   • Atrial fibrillation (HCC)    • Back pain    • Borderline personality disorder (HCC)    • " "Breath shortness     with tachycardia   • Bronchitis    • Cardiac arrhythmia    • Chickenpox    • Chronic UTI 9/18/2010   • Cough    • Daytime sleepiness    • Depression    • Diabetes (HCC)    • Diarrhea    • Disorder of thyroid    • Fall    • Fatigue    • Frequent headaches    • Gasping for breath    • Gynecological disorder     PCOS   • Headache(784.0)    • Heart burn    • History of falling    • Hypertension    • Indigestion    • Migraine    • 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HCC)    • 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HCC)    • Nausea    • Obesity    • Other fatigue 6/29/2020   • Pain 08-15-12    back, 7/10   • Painful joint    • PCOS (polycystic ovarian syndrome)    • Pneumonia 2012   • Psychosis (HCC)    • Renal disorder     \"kidney disease, stage 1\" nephrologist, Dr. 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   • Urinary bladder disorder     Suprapubic cath   • Urinary incontinence    • Weakness    • Wears glasses        FAMILY HISTORY  Family History   Problem Relation Age of Onset   • Hypertension Mother    • Sleep Apnea Mother    • Heart Disease Mother    • Other Mother         hypothryod   • Hypertension Maternal Uncle    • Heart Disease Maternal Grandmother    • Hypertension Maternal Grandmother    • No Known Problems Sister    • Other Sister         Narcolepsy;fibromyalsia;bone;nerve   • Genitourinary () Problems Sister         endometriosis       SOCIAL HISTORY  Social History     Tobacco Use   • Smoking status: Never Smoker   • Smokeless tobacco: Never Used   Substance Use Topics   • Alcohol use: No     Alcohol/week: 0.0 oz   • Drug use: Not Currently     Frequency: 7.0 times per week     Types: Marijuana      Social History     Substance and Sexual Activity   Drug Use Not Currently   • " Frequency: 7.0 times per week   • Types: Marijuana       SURGICAL HISTORY  Past Surgical History:   Procedure Laterality Date   • MUSCLE BIOPSY Right 1/26/2017    Procedure: MUSCLE BIOPSY - THIGH;  Surgeon: Isidro Vigil M.D.;  Location: SURGERY [facility];  Service:    • GASTROSCOPY WITH BALLOON DILATATION N/A 1/4/2017    Procedure: GASTROSCOPY WITH DILATATION;  Surgeon: Torres Vargas M.D.;  Location: SURGERY [facility];  Service:    • BOWEL STIMULATOR INSERTION  7/13/2016    Procedure: BOWEL STIMULATOR INSERTION FOR PERMANENT INTERSTIM SACRAL IMPLANT;  Surgeon: Joe Noyola M.D.;  Location: SURGERY [facility];  Service:    • RECOVERY  1/27/2016    Procedure: CATH LAB EP STUDY WITH SINUS NODE MODIFICATION LA;  Surgeon: [facility];  Location: SURGERY PRE-POST PROC UNIT [facility];  Service:    • OTHER CARDIAC SURGERY  1/2016    cardiac ablation   • NEURO DEST FACET L/S W/IG SNGL  4/21/2015    Performed by Reza Tabor at [facility]   • LUMBAR FUSION ANTERIOR  8/21/2012    Performed by SUSIE SAWANT at [facility]   • ALYSSA BY LAPAROSCOPY  8/29/2010    Performed by SHAYY JOHNS at [facility]   • LAMINOTOMY     • OTHER ABDOMINAL SURGERY     • TONSILLECTOMY      tonsillectomy       CURRENT MEDICATIONS  No current facility-administered medications for this encounter.     Current Outpatient Medications:   •  azithromycin (ZITHROMAX) 250 MG Tab, Take 500 mg (2 Tabs) by mouth on day one; then take 250 mg (1 Tab) by mouth once daily for 4 days., Disp: 6 Tab, Rfl: 0  •  ziprasidone (GEODON) 40 MG Cap, TAKE ONE CAPSULE BY MOUTH TWICE DAILY, Disp: 60 Cap, Rfl: 0  •  fluoxetine (PROZAC) 40 MG capsule, TAKE ONE CAPSULE BY MOUTH ONCE A DAY, Disp: 30 Cap, Rfl: 0  •  gabapentin (NEURONTIN) 300 MG Cap, Take 300 mg by mouth every day., Disp: , Rfl:   •  methocarbamol (ROBAXIN) 750 MG Tab, Take 750 mg by mouth 4 times a day., Disp: , Rfl:   •  Mirabegron ER  (MYRBETRIQ) 50 MG TABLET SR 24 HR, Take  by mouth., Disp: , Rfl:   •  sodium bicarbonate (SODIUM BICARBONATE) 650 MG Tab, Take 650 mg by mouth 4 times a day., Disp: , Rfl:   •  NON SPECIFIED, Indications: D-Mannose, Disp: , Rfl:   •  traZODone (DESYREL) 100 MG Tab, TAKE  ONE TABLET BY MOUTH NIGHTLY AT BEDTIME, Disp: 90 Tab, Rfl: 1  •  magnesium oxide (MAG-OX) 400 MG Tab tablet, Take 1 Tab by mouth every day., Disp: 90 Tab, Rfl: 3  •  Riboflavin 400 MG Cap, Take 1 Cap by mouth every day., Disp: 90 Cap, Rfl: 3  •  Coenzyme Q10 300 MG Cap, Take 1 Cap by mouth every day., Disp: 90 Cap, Rfl: 3  •  topiramate (TOPAMAX) 25 MG Tab, Take 1 Tab by mouth every evening. Take 1 tablet before sleep nightly for 7 days then increase to 2 tablets before bedtime after that, Disp: 180 Tab, Rfl: 3  •  Rizatriptan Benzoate (MAXALT-MLT) 5 MG TABLET DISPERSIBLE, Take 1 Tab by mouth two times a week. Take 1 tablet at the onset of migraine can repeat up to 1 tab more in 24h no more than 10 tablets/mo, Disp: 10 Tab, Rfl: 3  •  prochlorperazine (COMPAZINE) 10 MG Tab, Take 1 Tab by mouth every 6 hours as needed for Nausea/Vomiting (headache). (Patient not taking: Reported on 7/10/2020), Disp: 15 Tab, Rfl: 3  •  OXcarbazepine (TRILEPTAL) 150 MG Tab, TAKE ONE TABLET BY MOUTH TWICE DAILY, Disp: 60 Tab, Rfl: 1  •  cefdinir (OMNICEF) 300 MG Cap, cefdinir 300 mg capsule, Disp: , Rfl:   •  nitrofurantoin (MACRODANTIN) 50 MG Cap, nitrofurantoin macrocrystal 50 mg capsule  Take 1 capsule as needed by oral route.  Take 1 capsule following sexual intercourse, Disp: , Rfl:   •  nystatin (MYCOSTATIN) 176045 UNIT/GM Cream topical cream, nystatin 100,000 unit/gram topical cream  APPLY TO THE AFFECTED AREA(S) BY TOPICAL ROUTE 2 TIMES PER DAY, Disp: , Rfl:   •  ondansetron (ZOFRAN ODT) 4 MG TABLET DISPERSIBLE, Take 1 Tab by mouth every 6 hours as needed for Nausea., Disp: 60 Tab, Rfl: 0  •  busPIRone (BUSPAR) 10 MG Tab tablet, TAKE ONE TABLET BY MOUTH TWICE  DAILY, Disp: 60 Tab, Rfl: 0  •  acetaminophen (TYLENOL) 500 MG Tab, Take 1 Tab by mouth 4 times a day as needed for Mild Pain, Moderate Pain or Fever (Mild Pain; (Pain scale 1-3); Temp greater than 100.5 F)., Disp: 60 Tab, Rfl: 0  •  predniSONE (DELTASONE) 20 MG Tab, Take 1 Tab by mouth every day. (Patient taking differently: Take 15 mg by mouth every day.), Disp: 40 Tab, Rfl: 0  •  acetaZOLAMIDE (DIAMOX) 250 MG Tab, Take 1,000-1,500 mg by mouth 2 times a day. 1500 mg in the AM 1000 mg in the PM, Disp: , Rfl:   •  diphenhydrAMINE (BENADRYL) 12.5 MG/5ML Liquid liquid, Take 25 mg by mouth 2 times daily with meals as needed. With the Doxycycline, Disp: , Rfl:   •  insulin glargine (LANTUS SOLOSTAR) 100 UNIT/ML Solution Pen-injector injection, Inject 15 Units as instructed every evening., Disp: 5 PEN, Rfl: 3  •  Ivabradine HCl (CORLANOR) 7.5 MG Tab, Take 7.5 mg by mouth 2 Times a Day. Needs to schedule follow up with Dr. Wilde, Disp: 180 Tab, Rfl: 1  •  levothyroxine (SYNTHROID) 100 MCG Tab, Take 1 Tab by mouth Every morning on an empty stomach. (Patient taking differently: Take 75 mcg by mouth Every morning on an empty stomach.), Disp: 30 Tab, Rfl: 2  •  vitamin D (VITAMIND D3) 1000 UNIT Tab, Take 1 Tab by mouth every day., Disp: 60 Tab, Rfl: 0  •  pregabalin (LYRICA) 300 MG capsule, Take 300 mg by mouth 2 times a day., Disp: , Rfl:   •  aspirin EC (ECOTRIN) 81 MG Tablet Delayed Response, Take 81 mg by mouth every day., Disp: , Rfl:   •  potassium chloride SA (KDUR) 20 MEQ Tab CR, Take 20 mEq by mouth 2 times a day., Disp: , Rfl:   •  tiotropium (SPIRIVA) 18 MCG Cap, Inhale 1 Cap by mouth every day., Disp: 90 Cap, Rfl: 3  •  ipratropium-albuterol (DUONEB) 0.5-2.5 (3) MG/3ML nebulizer solution, 3 mL by Nebulization route every 6 hours as needed for Shortness of Breath., Disp: 60 Bullet, Rfl: 1  •  etonogestrel (NEXPLANON) 68 MG Implant implant, Inject 1 Each as instructed Once., Disp: , Rfl:   •  mycophenolate  "(CELLCEPT) 500 MG tablet, Take 1,000 mg by mouth 2 times a day., Disp: , Rfl:   •  Dulaglutide (TRULICITY) 1.5 MG/0.5ML Solution Pen-injector, Inject 1.5 mg as instructed every Friday., Disp: , Rfl:   •  albuterol 108 (90 Base) MCG/ACT Aero Soln inhalation aerosol, Inhale 2 Puffs by mouth every 6 hours as needed for Shortness of Breath., Disp: , Rfl:   •  Melatonin 10 MG Tab, Take 10 mg by mouth every evening., Disp: , Rfl:   •  furosemide (LASIX) 80 MG Tab, Take 80 mg by mouth 1 time daily as needed., Disp: , Rfl:     ALLERGIES  Allergies   Allergen Reactions   • Depakote [Divalproex Sodium] Unspecified     Muscle spasms/muscle pain and weakness     • Amitriptyline Unspecified     Headaches     • Aripiprazole [Abilify] Unspecified     Headaches/muscle twitching     • Clindamycin Nausea     Even with food     • Flagyl [Metronidazole Hcl] Unspecified     \"eye problems\"     • Flomax [Tamsulosin Hydrochloride] Swelling   • Levaquin Unspecified     Severe muscle cramps in legs  RXN=unknown   • Metformin Unspecified     Increased lactic acid      • Tape Rash     Tears skin off  coban with Tegaderm tape ok intermittently  RXN=ongoing   • Vancomycin Itching     Pt becomes flushed in face and chest.   RXN=7/10/16   • Wound Dressing Adhesive Hives     By pt report   • Ciprofloxacin    • Hydromorphone Hcl      Pt states she feels loopy   • Keflex Rash     Pt states she gets a rash with this medication  Tolerates ceftriaxone   • Levofloxacin Unspecified     Leg muscle cramps   • Penicillins        PHYSICAL EXAM  VITAL SIGNS: /72   Pulse 84   Temp 36.7 °C (98 °F) (Temporal)   Ht 1.651 m (5' 5\")   Wt 117.9 kg (260 lb)   SpO2 99%   BMI 43.27 kg/m²     Constitutional: Patient is well developed. Morbidly obese. Non-toxic appearing. Moderate distress.   HENT: Mild soft tissue swelling in right inferior and orbital cheek region with no contusion or abrasion. No subconjunctival hemorrhage. Normocephalic. Bilateral external " auditory canals normal without drainage or cerumen. TM's visualized without erythema. Nose normal with no mucosal edema or drainage. Oropharynx moist without erythema or exudates.  She has very harsh voice.  Eyes: PERRL, EOMI, Conjunctiva without erythema or exudates.   Neck: Supple with  Normal range of motion in flexion, extension and lateral rotation. No tenderness along the bony prominences or paraspinal muscles.  Lymphatic: No lymphadenopathy noted.   Cardiovascular: Normal heart rate and Regular rhythm. No murmur  Thorax & Lungs: Clear and equal breath sounds with good excursion. No respiratory distress, no rhonchi, wheezing or rales. No chest tenderness  or signs of trauma .  Abdomen: Abdomen obese. Bowel sounds normal in all four quadrants. Soft,nontender, no rebound , guarding, palpable masses.   Skin: Pale, Warm, Dry, No contusions, abrasions or rashes.  Back: No cervical, thoracic, or lumbosacral tenderness. No CVA tenderness.   Genitalia: Lower indwelling jara catheter.  Extremities: lower extremities general weakness bilaterally with 3+ non pitting edema. Right wrist tender on distal radius with minimal soft tissues swelling. Peripheral pulses 4/4    Musculoskeletal: Normal range of motion in all major joints.   Neurologic: Alert & oriented x 3, Normal motor function, Normal sensory function, No lateralizing or focal deficits noted. DTR's 4/4 bilaterally.  Psychiatric: Affect normal, Judgment normal, Mood normal.      DIAGNOSTICS/PROCEDURES    LABS  Results for orders placed or performed during the hospital encounter of 07/25/20   CBC WITH DIFFERENTIAL   Result Value Ref Range    WBC 5.1 4.8 - 10.8 K/uL    RBC 4.50 4.20 - 5.40 M/uL    Hemoglobin 13.3 12.0 - 16.0 g/dL    Hematocrit 43.1 37.0 - 47.0 %    MCV 95.8 81.4 - 97.8 fL    MCH 29.6 27.0 - 33.0 pg    MCHC 30.9 (L) 33.6 - 35.0 g/dL    RDW 47.9 35.9 - 50.0 fL    Platelet Count 149 (L) 164 - 446 K/uL    MPV 11.3 9.0 - 12.9 fL    Neutrophils-Polys  71.00 44.00 - 72.00 %    Lymphocytes 20.70 (L) 22.00 - 41.00 %    Monocytes 6.50 0.00 - 13.40 %    Eosinophils 1.20 0.00 - 6.90 %    Basophils 0.20 0.00 - 1.80 %    Immature Granulocytes 0.40 0.00 - 0.90 %    Nucleated RBC 0.00 /100 WBC    Neutrophils (Absolute) 3.63 2.00 - 7.15 K/uL    Lymphs (Absolute) 1.06 1.00 - 4.80 K/uL    Monos (Absolute) 0.33 0.00 - 0.85 K/uL    Eos (Absolute) 0.06 0.00 - 0.51 K/uL    Baso (Absolute) 0.01 0.00 - 0.12 K/uL    Immature Granulocytes (abs) 0.02 0.00 - 0.11 K/uL    NRBC (Absolute) 0.00 K/uL   BASIC METABOLIC PANEL   Result Value Ref Range    Sodium 140 135 - 145 mmol/L    Potassium 3.7 3.6 - 5.5 mmol/L    Chloride 113 (H) 96 - 112 mmol/L    Co2 13 (L) 20 - 33 mmol/L    Glucose 118 (H) 65 - 99 mg/dL    Bun 7 (L) 8 - 22 mg/dL    Creatinine 0.85 0.50 - 1.40 mg/dL    Calcium 8.8 8.5 - 10.5 mg/dL    Anion Gap 14.0 7.0 - 16.0   ESTIMATED GFR   Result Value Ref Range    GFR If African American >60 >60 mL/min/1.73 m 2    GFR If Non African American >60 >60 mL/min/1.73 m 2   EKG   Result Value Ref Range    Report       [facility] Emergency Dept.    Test Date:  2020  Pt Name:    HARLEY DE LA CRUZ              Department: ER  MRN:        [id number]                      Room:        21  Gender:     Female                       Technician: [id number]  :        1990                   Requested By:CHARISSE LAL  Order #:    [id number]                    Kevin MD:    Measurements  Intervals                                Axis  Rate:       83                           P:          39  NV:         148                          QRS:        20  QRSD:       94                           T:          -57  QT:         392  QTc:        461    Interpretive Statements  SINUS RHYTHM  BORDERLINE T ABNORMALITIES, DIFFUSE LEADS  BASELINE WANDER IN LEAD(S) I,II,aVR,aVL,aVF  Compared to ECG 2020 06:04:50  No significant changes       *Note: Due to a large number of results and/or  encounters for the requested time period, some results have not been displayed. A complete set of results can be found in Results Review.      Labs reviewed by me    EKG  Results for orders placed or performed during the hospital encounter of 20   EKG   Result Value Ref Range    Report       [facility]    Test Date:  2020  Pt Name:    HARLEY DE LA CRUZ              Department: ER  MRN:        [id number]                      Room:        21  Gender:     Female                       Technician: [id number]  :        1990                   Requested By:CHARISSE LAL  Order #:    [id number]                    Reading MD:    Measurements  Intervals                                Axis  Rate:       83                           P:          39  NY:         148                          QRS:        20  QRSD:       94                           T:          -57  QT:         392  QTc:        461    Interpretive Statements  SINUS RHYTHM  BORDERLINE T ABNORMALITIES, DIFFUSE LEADS  BASELINE WANDER IN LEAD(S) I,II,aVR,aVL,aVF  Compared to ECG 2020 06:04:50  No significant changes       *Note: Due to a large number of results and/or encounters for the requested time period, some results have not been displayed. A complete set of results can be found in Results Review.        RADIOLOGY/PROCEDURES  CT-MAXILLOFACIAL W/O PLUS RECONS   Final Result      No acute facial fracture.            DX-WRIST-COMPLETE 3+ RIGHT   Final Result      No acute osseous abnormality.      See details above.        Results and radiologist interpretation reviewed by me.     COURSE & MEDICAL DECISION MAKING  Pertinent Labs & Imaging studies reviewed. (See chart for details)    6:28 PM - Patient seen and evaluated at bedside. Ordered for EKG, BMP, CBC with differential, estimated GFR,  to evaluate. Patient will be treated with tylenol for her symptoms.    7:53 PM I discussed the patient's case and the above  findings with Dr. Yousif (Neurology) who requested that we have ophthalmology evaluate the patient due to her symptoms.    8:26 PM I discussed the patient's case and the above findings with Dr. Kent (Ophthalmology) who came in to evaluate the patient    10:12 PM I was updated by Dr. Kent that the patients exam was reassuring and that there were no signs of any retinal detachments or tears.  He states that she is fine to be discharged and should follow up with Dr. Yousif.   Patient's wrist x-ray was negative for any fracture, CT maxillofacial bone was negative as well.  10:13 PM Patient was reevaluated at bedside. Discussed labs and imagining. Patient will be discharged at this time.       The patient will return for new or worsening symptoms and is stable at the time of discharge.    DISPOSITION:  Patient will be discharged home in stable condition.    FOLLOW UP:  Torres Brody M.D.  [address]  [facility] [address]  [phone]    Schedule an appointment as soon as possible for a visit in 3 days  For recheck    Luis Felipe Yousif D.O.  [address]  [facility] [address]  [phone]    Schedule an appointment as soon as possible for a visit in 2 days  For recheck      OUTPATIENT MEDICATIONS:  Discharge Medication List as of 7/25/2020 10:19 PM           FINAL IMPRESSION  1. Fall, initial encounter    2. Syncope, unspecified syncope type    3. Visual disturbance    4. Sprain of right wrist, initial encounter    5. Contusion of face, initial encounter         IFroilan), am scribing for, and in the presence of, Sonal Bryant D.O..    Electronically signed by: Froilan Espinosa), 7/25/2020    ISonal D.O. personally performed the services described in this documentation, as scribed by Froilan Rao in my presence, and it is both accurate and complete.  C  The note accurately reflects work and decisions made by me.  Sonal Bryant D.O.  7/26/2020  1:59 AM

## 2020-07-26 NOTE — CONSULTS
"Ophthalmology Consult    CC: right eye vision changes    HPI: 28 yo female who is normally followed by Dr. 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  She notes right eye vision changes, \"sparkling\", double vision initially, no eye pain but periorbital pain present.  Had similar fall last week.  She notes some blurriness to the right eye.  Difficult to assess what her baseline vision is, does not wear correction.      ROS:   syncopal episode, visual changes, right wrist pain, headache. no fever or cough, otherwise negative    PMH:  Past Medical History:   Diagnosis Date   • Other fatigue 6/29/2020   • Sinus tachycardia 10/31/2013   • Pain 08-15-12    back, 7/10   • Pneumonia 2012   • Chronic UTI 9/18/2010   • Abdominal pain    • Anginal syndrome     random chest pain especially with tachycardia   • Apnea, sleep    • Arrhythmia     \"sinus tachycardia\", cariologist, Dr. Kumar; ablation 2/2016   • Arthritis     osteo   • ASTHMA     when around smoke   • Atrial fibrillation (HCC)    • Back pain    • Borderline personality disorder (HCC)    • Breath shortness     with tachycardia   • Bronchitis    • Cardiac arrhythmia    • Chickenpox    • Cough    • Daytime sleepiness    • Depression    • Diabetes (HCC)    • Diarrhea    • Disorder of thyroid    • Fall    • Fatigue    • Frequent headaches    • Gasping for breath    • Gynecological disorder     PCOS   • Headache(784.0)    • Heart burn    • History of falling    • Hypertension    • Indigestion    • Migraine    • 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HCC)    • 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HCC)    • Nausea    • Obesity    • Painful joint    • PCOS (polycystic ovarian syndrome)    • Psychosis (HCC)    • Renal disorder     \"kidney disease, stage 1\" " "nephrologist, Dr. 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   • Urinary bladder disorder     Suprapubic cath   • Urinary incontinence    • Weakness    • Wears glasses         POH:   IIH  Possible CRION    FHx: none ocular    SHx: no EtOH, no tobacco use, no IVDU    Medications:  No current facility-administered medications for this encounter.     Current Outpatient Medications:   •  azithromycin, Take 500 mg (2 Tabs) by mouth on day one; then take 250 mg (1 Tab) by mouth once daily for 4 days.  •  ziprasidone, TAKE ONE CAPSULE BY MOUTH TWICE DAILY  •  fluoxetine, TAKE ONE CAPSULE BY MOUTH ONCE A DAY  •  gabapentin, 300 mg, Oral, DAILY  •  methocarbamol, 750 mg, Oral, 4XDAY  •  Myrbetriq, Take  by mouth.  •  sodium bicarbonate, 650 mg, Oral, 4XDAY  •  NON SPECIFIED, Indications: D-Mannose  •  traZODone, TAKE  ONE TABLET BY MOUTH NIGHTLY AT BEDTIME  •  magnesium oxide, 400 mg, Oral, DAILY  •  Riboflavin, 400 mg, Oral, DAILY  •  Coenzyme Q10, 1 Cap, Oral, DAILY  •  topiramate, 25 mg, Oral, Q EVENING  •  Rizatriptan Benzoate, 1 Tab, Oral, 2X A WEEK  •  prochlorperazine, 10 mg, Oral, Q6HRS PRN (Patient not taking: Reported on 7/10/2020)  •  OXcarbazepine, TAKE ONE TABLET BY MOUTH TWICE DAILY  •  cefdinir, cefdinir 300 mg capsule  •  nitrofurantoin, nitrofurantoin macrocrystal 50 mg capsule  Take 1 capsule as needed by oral route.  Take 1 capsule following sexual intercourse  •  nystatin, nystatin 100,000 unit/gram topical cream  APPLY TO THE AFFECTED AREA(S) BY TOPICAL ROUTE 2 TIMES PER DAY  •  ondansetron, 4 mg, Oral, Q6HRS PRN  •  busPIRone, TAKE ONE TABLET BY MOUTH TWICE DAILY  •  acetaminophen, 500 mg, Oral, 4X/DAY PRN  •  predniSONE, 20 mg, Oral, DAILY (Patient taking differently: 15 mg, Oral, DAILY)  •  acetaZOLAMIDE, " "1,000-1,500 mg, Oral, BID  •  diphenhydrAMINE, 25 mg, Oral, BID W/MEALS PRN  •  Lantus SoloStar, 15 Units, Subcutaneous, Q EVENING  •  Corlanor, 7.5 mg, Oral, BID  •  levothyroxine, 100 mcg, Oral, AM ES (Patient taking differently: 75 mcg, Oral, EACH MORNING ON EMPTY STOMACH)  •  vitamin D, 1,000 Units, Oral, DAILY  •  pregabalin, 300 mg, Oral, BID  •  aspirin EC, 81 mg, Oral, DAILY  •  potassium chloride SA, 20 mEq, Oral, BID  •  tiotropium, 18 mcg, Inhalation, DAILY  •  ipratropium-albuterol, 3 mL, Nebulization, Q6HRS PRN  •  etonogestrel, 1 Each, Subcutaneous, Once  •  mycophenolate, 1,000 mg, Oral, BID  •  Dulaglutide, 1.5 mg, Subcutaneous, Q FRIDAY  •  albuterol, 2 Puff, Inhalation, Q6HRS PRN  •  Melatonin, 10 mg, Oral, Q EVENING  •  furosemide, 80 mg, Oral, QDAY PRN     Allergies:    Allergies   Allergen Reactions   • Depakote [Divalproex Sodium] Unspecified     Muscle spasms/muscle pain and weakness     • Amitriptyline Unspecified     Headaches     • Aripiprazole [Abilify] Unspecified     Headaches/muscle twitching     • Clindamycin Nausea     Even with food     • Flagyl [Metronidazole Hcl] Unspecified     \"eye problems\"     • Flomax [Tamsulosin Hydrochloride] Swelling   • Levaquin Unspecified     Severe muscle cramps in legs  RXN=unknown   • Metformin Unspecified     Increased lactic acid      • Tape Rash     Tears skin off  coban with Tegaderm tape ok intermittently  RXN=ongoing   • Vancomycin Itching     Pt becomes flushed in face and chest.   RXN=7/10/16   • Wound Dressing Adhesive Hives     By pt report   • Ciprofloxacin    • Hydromorphone Hcl      Pt states she feels loopy   • Keflex Rash     Pt states she gets a rash with this medication  Tolerates ceftriaxone   • Levofloxacin Unspecified     Leg muscle cramps   • Penicillins         Visual acuity without correction (near)  OD-  20/    40     Pinhole   20/   OS-  20/    30     Pinhole   20/    Pupils- 4-3mm OU, brisk, no APD noted    Extraocular " Motility:  Full OU without restriction or deviation    Confrontational Visual Fields:  Full OS; right eye has some generalized peripheral deficits throughout range of testing    Intraocular pressure: STP OU    CT reviewed, no bony abnormality/fracture     Bedside Exam:  Minimal? Inferior periorbital edema right side  Lids and lashes:  Normal OU  Conjunctiva and sclera:  White and quiet OU  Cornea: Clear OU  Anterior chamber: Deep and Quiet OU  Iris:  Round and normally reactive OU  Lens:  Clear OU    Fundus Exam (right eye only):  Vitreous- Clear   Disc-  No swelling/edema and no pallor, no nerve hemorrhages  Macula- Flat, superior choroidal nevus at superior arcade; single dot blot heme paramacular  Vessels- Normal course and caliber OU  Periphery- Normal in appearance OU, NO retinal detachment or tear observed      Assessment   1. Retinal hemorrhage, possibly diabetic retinopathy-- very unlikely related to fall and not causing visual disturbance  2. Choroidal nevus-- no suspicious features observed  3. Visual disturbance-- no acute retinal or nerve pathology seen on bedside exam  4. Idiopathic intracranial hypertenion-- on diamox, followed by Dr. Yousif      Plan:  1. BP/BG control  2. Monitor nevus routinely  3. Keep f/u with Dr. Yousif as scheduled  4. Return precautions for curtain, encroaching shadow/defect, or sudden vision loss      Devaughn Kent MD  Ophthalmology   [phone]

## 2020-07-26 NOTE — DISCHARGE INSTRUCTIONS
Ice to your wrist for the next 24 hours then warm soaks in Epsom salts  You need to call Dr. Yousif first thing Monday morning to schedule an appointment for recheck.  You need to be very cautious about not falling.  Follow-up with Dr. Brody this week for recheck as well.  Tylenol for pain

## 2020-07-26 NOTE — ED NOTES
MD at bedside prior to discharge. Pt given discharge instructions and verbalized understanding, all questions are answered, signed copy in chart. PIV removed and dressed. Pt to lobby in her wheelchair, discharged to family member. Pt took all belongings from room.

## 2020-07-26 NOTE — ED NOTES
Pt medicated per MAR and educated on purpose/side effects of medication for her R sided facial pain. Pt verbalizes understanding and tolerates well at this time. Will continue to monitor.

## 2020-07-26 NOTE — ED NOTES
"Pt reports that she has had diplopia since the last time she had a concussion that has not resolved and sees \"sparkles\". The pt reports the RN is \"sparkly\" looking at this time. A neuro exam was performed and pt's neuro is in tact minus the visual acuity abnormality. Erp, Manny informed with a plan in place. Will continue to monitor.  "

## 2020-07-27 ENCOUNTER — APPOINTMENT (OUTPATIENT)
Dept: MEDICAL GROUP | Facility: MEDICAL CENTER | Age: 30
End: 2020-07-27
Payer: MEDICARE

## 2020-07-27 ENCOUNTER — APPOINTMENT (OUTPATIENT)
Dept: ADMISSIONS | Facility: MEDICAL CENTER | Age: 30
End: 2020-07-27
Payer: MEDICARE

## 2020-07-27 ENCOUNTER — TELEPHONE (OUTPATIENT)
Dept: MEDICAL GROUP | Facility: MEDICAL CENTER | Age: 30
End: 2020-07-27

## 2020-07-27 NOTE — TELEPHONE ENCOUNTER
1. Caller Name: Kristin                        Call Back Number: [phone]      How would the patient prefer to be contacted with a response:Phone    FYI: Patient called to let Dr. Brody know that she fell again getting out of bed. The strap she uses broke and she hurt her neck and is having shooting pain down her arms. Dr. Brody notified.

## 2020-07-30 ENCOUNTER — HOSPITAL ENCOUNTER (EMERGENCY)
Facility: MEDICAL CENTER | Age: 30
End: 2020-07-31
Attending: EMERGENCY MEDICINE
Payer: MEDICARE

## 2020-07-30 DIAGNOSIS — M25.532 WRIST PAIN, CHRONIC, LEFT: ICD-10-CM

## 2020-07-30 DIAGNOSIS — G89.29 WRIST PAIN, CHRONIC, LEFT: ICD-10-CM

## 2020-07-30 DIAGNOSIS — N32.89 BLADDER SPASMS: ICD-10-CM

## 2020-07-30 PROCEDURE — 87086 URINE CULTURE/COLONY COUNT: CPT

## 2020-07-30 PROCEDURE — 99285 EMERGENCY DEPT VISIT HI MDM: CPT

## 2020-07-30 ASSESSMENT — FIBROSIS 4 INDEX: FIB4 SCORE: 0.42

## 2020-07-31 ENCOUNTER — APPOINTMENT (OUTPATIENT)
Dept: RADIOLOGY | Facility: MEDICAL CENTER | Age: 30
End: 2020-07-31
Attending: EMERGENCY MEDICINE
Payer: MEDICARE

## 2020-07-31 VITALS
SYSTOLIC BLOOD PRESSURE: 127 MMHG | HEART RATE: 67 BPM | OXYGEN SATURATION: 99 % | TEMPERATURE: 97.7 F | HEIGHT: 65 IN | WEIGHT: 256.84 LBS | BODY MASS INDEX: 42.79 KG/M2 | RESPIRATION RATE: 18 BRPM | DIASTOLIC BLOOD PRESSURE: 65 MMHG

## 2020-07-31 LAB
AMORPH CRY #/AREA URNS HPF: PRESENT /HPF
APPEARANCE UR: ABNORMAL
BACTERIA #/AREA URNS HPF: ABNORMAL /HPF
BILIRUB UR QL STRIP.AUTO: NEGATIVE
COLOR UR: YELLOW
EPI CELLS #/AREA URNS HPF: ABNORMAL /HPF
GLUCOSE UR STRIP.AUTO-MCNC: NEGATIVE MG/DL
KETONES UR STRIP.AUTO-MCNC: NEGATIVE MG/DL
LEUKOCYTE ESTERASE UR QL STRIP.AUTO: ABNORMAL
MICRO URNS: ABNORMAL
MUCOUS THREADS #/AREA URNS HPF: ABNORMAL /HPF
NITRITE UR QL STRIP.AUTO: NEGATIVE
PH UR STRIP.AUTO: 6.5 [PH] (ref 5–8)
PROT UR QL STRIP: >=300 MG/DL
RBC # URNS HPF: ABNORMAL /HPF
RBC UR QL AUTO: ABNORMAL
SP GR UR REFRACTOMETRY: 1.03
WBC #/AREA URNS HPF: ABNORMAL /HPF

## 2020-07-31 PROCEDURE — 302874 HCHG BANDAGE ACE 2 OR 3""

## 2020-07-31 PROCEDURE — 51702 INSERT TEMP BLADDER CATH: CPT

## 2020-07-31 PROCEDURE — 73110 X-RAY EXAM OF WRIST: CPT | Mod: LT

## 2020-07-31 PROCEDURE — A9270 NON-COVERED ITEM OR SERVICE: HCPCS | Performed by: EMERGENCY MEDICINE

## 2020-07-31 PROCEDURE — 303105 HCHG CATHETER EXTRA

## 2020-07-31 PROCEDURE — 81001 URINALYSIS AUTO W/SCOPE: CPT

## 2020-07-31 PROCEDURE — 700102 HCHG RX REV CODE 250 W/ 637 OVERRIDE(OP): Performed by: EMERGENCY MEDICINE

## 2020-07-31 RX ORDER — OXYBUTYNIN CHLORIDE 5 MG/1
5 TABLET ORAL ONCE
Status: COMPLETED | OUTPATIENT
Start: 2020-07-31 | End: 2020-07-31

## 2020-07-31 RX ORDER — BUSPIRONE HYDROCHLORIDE 10 MG/1
TABLET ORAL
Qty: 60 TAB | Refills: 0 | Status: SHIPPED
Start: 2020-07-31 | End: 2020-08-15

## 2020-07-31 RX ADMIN — OXYBUTYNIN CHLORIDE 5 MG: 5 TABLET ORAL at 01:44

## 2020-07-31 ASSESSMENT — ENCOUNTER SYMPTOMS
CHILLS: 0
FEVER: 0
ABDOMINAL PAIN: 1
HEADACHES: 0

## 2020-07-31 NOTE — ED TRIAGE NOTES
"Chief Complaint   Patient presents with   • Urinary Catheter Problem     Pt states urinary cath not draining as much as normal, c/o pressure around bladder   • Wrist Pain     Pt states she fell 1 week ago, injured left wrist, seen at urgent care, was supposed to have recheck today, unable to go get follow up x ray   • Abdominal Pain     Pt c/o lower abdominal pain/spasms     Pt BIB EMS from home where she states she lives with grandmother. Pt alert and oriented, regular unlabored respirations.    /68   Pulse 68   Temp 36.4 °C (97.5 °F) (Oral)   Resp 18   Ht 1.651 m (5' 5\")   Wt 118 kg (260 lb 2.3 oz)   SpO2 97%   BMI 43.29 kg/m²       "

## 2020-07-31 NOTE — ED NOTES
PT has signed discharge paper work with no questions at this time. Pt is being discharged with jara. Pt aware of care routine for jara. [facility] to pick pt up at 0400.

## 2020-07-31 NOTE — ED PROVIDER NOTES
ED Provider Note  Primary care provider: Torres Brody M.D.  Means of arrival: EMS  History obtained from: patient  History limited by: none    CHIEF COMPLAINT  Chief Complaint   Patient presents with   • Urinary Catheter Problem     Pt states urinary cath not draining as much as normal, c/o pressure around bladder   • Wrist Pain     Pt states she fell 1 week ago, injured left wrist, seen at urgent care, was supposed to have recheck today, unable to go get follow up x ray   • Abdominal Pain     Pt c/o lower abdominal pain/spasms       HPI  Kristin Balderrama is a 29 y.o. female with complex medical history who is bedridden reportedly secondary to transverse myelitis who presents to the Emergency Department for urinary catheter problem.  Patient reports that she is supposed to have a suprapubic catheter placed by Dr. Rosenbaum sometime this week, however is concerned her current catheter is not draining appropriately and therefore came in for further evaluation.  She reports that she has had increased pressure around her suprapubic region and feels like she has to urinate and her catheter seems to be putting out less than normal.  She states over the last couple of hours it has put out 300 cc of urine and states that this is decreased for her.  Patient is also concerned about her left wrist.  She reports that she fell a week ago for which she was evaluated in the emergency department.  She subsequently followed up at [facility] urgent care for ongoing left wrist pain and was placed in a splint for possible fracture, although there is no obvious fracture.  She reports that she is supposed to have a repeat x-ray done today however was unable to make the appointment due to her concerns of her Andrade catheter dysfunction, she is requesting that her repeat x-ray be done.      Per chart review patient is well-known to this emergency department typically comes in for evaluation of syncope and chest pain.  She reportedly has a  history of idiopathic intracranial hypertension and is followed by  and Dr. Yousif.     REVIEW OF SYSTEMS  Review of Systems   Constitutional: Negative for chills and fever.   Cardiovascular: Negative for chest pain.   Gastrointestinal: Positive for abdominal pain.   Genitourinary: Positive for dysuria.   Musculoskeletal:        Positive for left wrist pain   Neurological: Negative for headaches.   All other systems reviewed and are negative.        PAST MEDICAL HISTORY   has a past medical history of Abdominal pain, Anginal syndrome, Apnea, sleep, Arrhythmia, Arthritis, ASTHMA, Atrial fibrillation ([facility]), Back pain, Borderline personality disorder ([facility]), Breath shortness, Bronchitis, Cardiac arrhythmia, Chickenpox, Chronic UTI (9/18/2010), Cough, Daytime sleepiness, Depression, Diabetes ([facility]), Diarrhea, Disorder of thyroid, Fall, Fatigue, Frequent headaches, Gasping for breath, Gynecological disorder, Headache(784.0), Heart burn, History of falling, Hypertension, Indigestion, Migraine, 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[facility]), 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[facility]), Nausea, Obesity, Other fatigue (6/29/2020), Pain (08-15-12), Painful joint, PCOS (polycystic ovarian syndrome), Pneumonia (2012), Psychosis ([facility]), Renal disorder, Ringing in ears, Scoliosis, Shortness of breath, Sinus tachycardia (10/31/2013), Sleep apnea, Snoring, Tonsillitis, 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[facility]), Tuberculosis, Urinary bladder disorder, Urinary incontinence, Weakness, and Wears glasses.    SURGICAL HISTORY   has a past surgical history that includes neuro dest facet l/s w/ig sngl (4/21/2015); recovery (1/27/2016); delmar by laparoscopy (8/29/2010); lumbar fusion anterior (8/21/2012); other cardiac surgery (1/2016); tonsillectomy; bowel stimulator insertion (7/13/2016); gastroscopy with balloon dilatation (N/A, 1/4/2017); muscle biopsy (Right, 1/26/2017); other abdominal surgery; and laminotomy.    SOCIAL HISTORY  Social History     Tobacco Use   •  Smoking status: Never Smoker   • Smokeless tobacco: Never Used   Substance Use Topics   • Alcohol use: No     Alcohol/week: 0.0 oz   • Drug use: Not Currently     Frequency: 7.0 times per week     Types: Marijuana      Social History     Substance and Sexual Activity   Drug Use Not Currently   • Frequency: 7.0 times per week   • Types: Marijuana       FAMILY HISTORY  Family History   Problem Relation Age of Onset   • Hypertension Mother    • Sleep Apnea Mother    • Heart Disease Mother    • Other Mother         hypothryod   • Hypertension Maternal Uncle    • Heart Disease Maternal Grandmother    • Hypertension Maternal Grandmother    • No Known Problems Sister    • Other Sister         Narcolepsy;fibromyalsia;bone;nerve   • Genitourinary () Problems Sister         endometriosis       CURRENT MEDICATIONS  Home Medications     Reviewed by Ursula Solis R.N. (Registered Nurse) on 07/31/20 at 0017  Med List Status: Unable to Obtain   Medication Last Dose Status   acetaminophen (TYLENOL) 500 MG Tab  Active   acetaZOLAMIDE (DIAMOX) 250 MG Tab  Active   albuterol 108 (90 Base) MCG/ACT Aero Soln inhalation aerosol  Active   aspirin EC (ECOTRIN) 81 MG Tablet Delayed Response  Active   azithromycin (ZITHROMAX) 250 MG Tab  Active   busPIRone (BUSPAR) 10 MG Tab tablet  Active   cefdinir (OMNICEF) 300 MG Cap  Active   Coenzyme Q10 300 MG Cap  Active   diphenhydrAMINE (BENADRYL) 12.5 MG/5ML Liquid liquid  Active   Dulaglutide (TRULICITY) 1.5 MG/0.5ML Solution Pen-injector  Active   etonogestrel (NEXPLANON) 68 MG Implant implant  Active   fluoxetine (PROZAC) 40 MG capsule  Active   furosemide (LASIX) 80 MG Tab  Active   gabapentin (NEURONTIN) 300 MG Cap  Active   insulin glargine (LANTUS SOLOSTAR) 100 UNIT/ML Solution Pen-injector injection  Active   ipratropium-albuterol (DUONEB) 0.5-2.5 (3) MG/3ML nebulizer solution  Active   Ivabradine HCl (CORLANOR) 7.5 MG Tab  Active   levothyroxine (SYNTHROID) 100 MCG Tab  Active  "  magnesium oxide (MAG-OX) 400 MG Tab tablet  Active   Melatonin 10 MG Tab  Active   methocarbamol (ROBAXIN) 750 MG Tab  Active   Mirabegron ER (MYRBETRIQ) 50 MG TABLET SR 24 HR  Active   mycophenolate (CELLCEPT) 500 MG tablet  Active   nitrofurantoin (MACRODANTIN) 50 MG Cap  Active   NON SPECIFIED  Active   nystatin (MYCOSTATIN) 010207 UNIT/GM Cream topical cream  Active   ondansetron (ZOFRAN ODT) 4 MG TABLET DISPERSIBLE  Active   OXcarbazepine (TRILEPTAL) 150 MG Tab  Active   potassium chloride SA (KDUR) 20 MEQ Tab CR  Active   predniSONE (DELTASONE) 20 MG Tab  Active   pregabalin (LYRICA) 300 MG capsule  Active   prochlorperazine (COMPAZINE) 10 MG Tab  Active   Riboflavin 400 MG Cap  Active   Rizatriptan Benzoate (MAXALT-MLT) 5 MG TABLET DISPERSIBLE  Active   sodium bicarbonate (SODIUM BICARBONATE) 650 MG Tab  Active   tiotropium (SPIRIVA) 18 MCG Cap  Active   topiramate (TOPAMAX) 25 MG Tab  Active   traZODone (DESYREL) 100 MG Tab  Active   vitamin D (VITAMIND D3) 1000 UNIT Tab  Active   ziprasidone (GEODON) 40 MG Cap  Active                ALLERGIES  Allergies   Allergen Reactions   • Depakote [Divalproex Sodium] Unspecified     Muscle spasms/muscle pain and weakness     • Amitriptyline Unspecified     Headaches     • Aripiprazole [Abilify] Unspecified     Headaches/muscle twitching     • Clindamycin Nausea     Even with food     • Flagyl [Metronidazole Hcl] Unspecified     \"eye problems\"     • Flomax [Tamsulosin Hydrochloride] Swelling   • Levaquin Unspecified     Severe muscle cramps in legs  RXN=unknown   • Metformin Unspecified     Increased lactic acid      • Tape Rash     Tears skin off  coban with Tegaderm tape ok intermittently  RXN=ongoing   • Vancomycin Itching     Pt becomes flushed in face and chest.   RXN=7/10/16   • Wound Dressing Adhesive Hives     By pt report   • Ciprofloxacin    • Hydromorphone Hcl      Pt states she feels loopy   • Keflex Rash     Pt states she gets a rash with this " "medication  Tolerates ceftriaxone   • Levofloxacin Unspecified     Leg muscle cramps   • Penicillins        PHYSICAL EXAM  VITAL SIGNS: /68   Pulse 68   Temp 36.4 °C (97.5 °F) (Oral)   Resp 18   Ht 1.651 m (5' 5\")   Wt 118 kg (260 lb 2.3 oz)   SpO2 97%   BMI 43.29 kg/m²   Vitals reviewed by myself.  Physical Exam   Constitutional: She is well-developed, well-nourished, and in no distress. No distress.   HENT:   Head: Normocephalic and atraumatic.   Eyes: EOM are normal.   Neck: Normal range of motion.   Cardiovascular: Normal rate and regular rhythm.   2+ bilateral radial pulses   Pulmonary/Chest: Effort normal and breath sounds normal. No respiratory distress. She has no wheezes. She has no rales.   Abdominal: Soft. She exhibits no distension. There is no abdominal tenderness. There is no rebound and no guarding.   Genitourinary:    Genitourinary Comments: Andrade catheter is draining clear urine without clots or blood in it     Musculoskeletal:      Comments: Patient has decreased range of motion left wrist secondary to pain, no obvious swelling or deformity to left wrist.  She is able to wiggle all of her fingers.   Neurological:   Sensation intact to bilateral median, ulnar and radial nerve distributions.   Skin: Skin is warm and dry.         DIAGNOSTIC STUDIES /  LABS  Labs Reviewed   URINALYSIS,CULTURE IF INDICATED - Abnormal; Notable for the following components:       Result Value    Character Hazy (*)     Protein >=300 (*)     Leukocyte Esterase Trace (*)     Occult Blood Large (*)     All other components within normal limits   URINE MICROSCOPIC (W/UA) - Abnormal; Notable for the following components:    WBC 10-20 (*)     -150 (*)     Bacteria Few (*)     All other components within normal limits   REFRACTOMETER SG   URINE CULTURE(NEW)   URINE CULTURE-EXISTING-LESS THAN 48 HOURS       All labs reviewed by me.        RADIOLOGY  DX-WRIST-COMPLETE 3+ LEFT   Final Result         1.  No acute " traumatic bony injury.   2.  Slight ulnar minus variant        The radiologist's interpretation of all radiological studies have been reviewed by me.      COURSE & MEDICAL DECISION MAKING  Nursing notes, VS, PMSFHx reviewed in chart.    Patient is a 29-year-old female who comes in for evaluation of urinary catheter dysfunction.  On my exam patient's catheter appears to be draining appropriately with no blood in the urine making clotting unlikely.  As patient is having a lot of bladder spasms I advised her we will replace the Andrade catheter and test her for possible urinary tract infection to which she is agreeable.  Regarding her left wrist she is neurovascularly intact with no deformities, differential include sprain, strain, fracture or dislocation.  We will repeat an x-ray here today.    Patient's initial vitals are within normal limits and she is well-appearing on exam.  Wrist x-ray returns and demonstrates no acute bony abnormalities.  Urinalysis returns and appears contaminated likely from difficult Andrade catheterization, no evidence of acute infection.  I will send urine for culture to ensure that it does not grow out positive cultures but will not start treatment at this time.  I advised patient she is likely experiencing bladder spasms around her catheter site.  As she is on many medications I do not feel comfortable adding or switching medications at this time and advised her to discuss starting bladder antispasmodics with her urologist or her primary care physician.  Patient is agreeable to this plan.  She is been given strict return precautions and discharged in stable condition.      FINAL IMPRESSION  1. Bladder spasms    2. Wrist pain, chronic, left

## 2020-07-31 NOTE — ED NOTES
Pts jara replaced. No urine return. ERP aware, states to give it some time to try and collect a urine sample.

## 2020-08-01 ENCOUNTER — OFFICE VISIT (OUTPATIENT)
Dept: URGENT CARE | Facility: CLINIC | Age: 30
End: 2020-08-01
Payer: MEDICARE

## 2020-08-01 ENCOUNTER — HOSPITAL ENCOUNTER (EMERGENCY)
Facility: MEDICAL CENTER | Age: 30
End: 2020-08-01
Attending: EMERGENCY MEDICINE
Payer: MEDICARE

## 2020-08-01 VITALS
SYSTOLIC BLOOD PRESSURE: 126 MMHG | RESPIRATION RATE: 18 BRPM | OXYGEN SATURATION: 99 % | DIASTOLIC BLOOD PRESSURE: 74 MMHG | HEART RATE: 74 BPM | HEIGHT: 65 IN | BODY MASS INDEX: 42.65 KG/M2 | WEIGHT: 256 LBS | TEMPERATURE: 98 F

## 2020-08-01 VITALS
DIASTOLIC BLOOD PRESSURE: 89 MMHG | WEIGHT: 256 LBS | RESPIRATION RATE: 16 BRPM | HEIGHT: 65 IN | SYSTOLIC BLOOD PRESSURE: 111 MMHG | OXYGEN SATURATION: 95 % | HEART RATE: 83 BPM | BODY MASS INDEX: 42.65 KG/M2 | TEMPERATURE: 98.2 F

## 2020-08-01 DIAGNOSIS — T83.9XXA PROBLEM WITH FOLEY CATHETER, INITIAL ENCOUNTER (HCC): ICD-10-CM

## 2020-08-01 DIAGNOSIS — N76.0 VAGINITIS AND VULVOVAGINITIS: ICD-10-CM

## 2020-08-01 PROCEDURE — 99214 OFFICE O/P EST MOD 30 MIN: CPT | Performed by: NURSE PRACTITIONER

## 2020-08-01 PROCEDURE — 303105 HCHG CATHETER EXTRA

## 2020-08-01 PROCEDURE — 99283 EMERGENCY DEPT VISIT LOW MDM: CPT

## 2020-08-01 PROCEDURE — 51702 INSERT TEMP BLADDER CATH: CPT

## 2020-08-01 RX ORDER — FLUCONAZOLE 100 MG/1
TABLET ORAL
Qty: 8 TAB | Refills: 0 | Status: SHIPPED
Start: 2020-08-01 | End: 2020-08-15

## 2020-08-01 ASSESSMENT — ENCOUNTER SYMPTOMS
TINGLING: 0
SENSORY CHANGE: 0
COUGH: 0
DIARRHEA: 1
CHILLS: 0
FEVER: 0

## 2020-08-01 ASSESSMENT — FIBROSIS 4 INDEX
FIB4 SCORE: 0.42
FIB4 SCORE: 0.42

## 2020-08-01 NOTE — PROGRESS NOTES
Subjective:      Kristin Balderrama is a 29 y.o. female who presents with Rash (x rash around the vaginal area, burns )            Rash   This is a new problem. The current episode started in the past 7 days. The affected locations include the genitalia and groin. The rash is characterized by burning, itchiness and redness. She was exposed to nothing. Associated symptoms include diarrhea. Pertinent negatives include no congestion, cough, fever or joint pain. Treatments tried: nystatin cream. The treatment provided no relief.     Depakote [divalproex sodium]; Amitriptyline; Aripiprazole [abilify]; Clindamycin; Flagyl [metronidazole hcl]; Flomax [tamsulosin hydrochloride]; Levaquin; Metformin; Tape; Vancomycin; Wound dressing adhesive; Ciprofloxacin; Hydromorphone hcl; Keflex; Levofloxacin; and Penicillins  Current Outpatient Medications on File Prior to Visit   Medication Sig Dispense Refill   • busPIRone (BUSPAR) 10 MG Tab tablet TAKE ONE TABLET BY MOUTH TWICE DAILY 60 Tab 0   • azithromycin (ZITHROMAX) 250 MG Tab Take 500 mg (2 Tabs) by mouth on day one; then take 250 mg (1 Tab) by mouth once daily for 4 days. 6 Tab 0   • ziprasidone (GEODON) 40 MG Cap TAKE ONE CAPSULE BY MOUTH TWICE DAILY 60 Cap 0   • fluoxetine (PROZAC) 40 MG capsule TAKE ONE CAPSULE BY MOUTH ONCE A DAY 30 Cap 0   • gabapentin (NEURONTIN) 300 MG Cap Take 300 mg by mouth every day.     • methocarbamol (ROBAXIN) 750 MG Tab Take 750 mg by mouth 4 times a day.     • Mirabegron ER (MYRBETRIQ) 50 MG TABLET SR 24 HR Take  by mouth.     • sodium bicarbonate (SODIUM BICARBONATE) 650 MG Tab Take 650 mg by mouth 4 times a day.     • NON SPECIFIED Indications: D-Mannose     • traZODone (DESYREL) 100 MG Tab TAKE  ONE TABLET BY MOUTH NIGHTLY AT BEDTIME 90 Tab 1   • magnesium oxide (MAG-OX) 400 MG Tab tablet Take 1 Tab by mouth every day. 90 Tab 3   • Riboflavin 400 MG Cap Take 1 Cap by mouth every day. 90 Cap 3   • Coenzyme Q10 300 MG Cap Take 1 Cap by mouth  every day. 90 Cap 3   • topiramate (TOPAMAX) 25 MG Tab Take 1 Tab by mouth every evening. Take 1 tablet before sleep nightly for 7 days then increase to 2 tablets before bedtime after that 180 Tab 3   • Rizatriptan Benzoate (MAXALT-MLT) 5 MG TABLET DISPERSIBLE Take 1 Tab by mouth two times a week. Take 1 tablet at the onset of migraine can repeat up to 1 tab more in 24h no more than 10 tablets/mo 10 Tab 3   • prochlorperazine (COMPAZINE) 10 MG Tab Take 1 Tab by mouth every 6 hours as needed for Nausea/Vomiting (headache). (Patient not taking: Reported on 7/10/2020) 15 Tab 3   • OXcarbazepine (TRILEPTAL) 150 MG Tab TAKE ONE TABLET BY MOUTH TWICE DAILY 60 Tab 1   • cefdinir (OMNICEF) 300 MG Cap cefdinir 300 mg capsule     • nitrofurantoin (MACRODANTIN) 50 MG Cap nitrofurantoin macrocrystal 50 mg capsule   Take 1 capsule as needed by oral route.   Take 1 capsule following sexual intercourse     • nystatin (MYCOSTATIN) 661882 UNIT/GM Cream topical cream nystatin 100,000 unit/gram topical cream   APPLY TO THE AFFECTED AREA(S) BY TOPICAL ROUTE 2 TIMES PER DAY     • ondansetron (ZOFRAN ODT) 4 MG TABLET DISPERSIBLE Take 1 Tab by mouth every 6 hours as needed for Nausea. 60 Tab 0   • acetaminophen (TYLENOL) 500 MG Tab Take 1 Tab by mouth 4 times a day as needed for Mild Pain, Moderate Pain or Fever (Mild Pain; (Pain scale 1-3); Temp greater than 100.5 F). 60 Tab 0   • predniSONE (DELTASONE) 20 MG Tab Take 1 Tab by mouth every day. (Patient taking differently: Take 15 mg by mouth every day.) 40 Tab 0   • acetaZOLAMIDE (DIAMOX) 250 MG Tab Take 1,000-1,500 mg by mouth 2 times a day. 1500 mg in the AM  1000 mg in the PM     • diphenhydrAMINE (BENADRYL) 12.5 MG/5ML Liquid liquid Take 25 mg by mouth 2 times daily with meals as needed. With the Doxycycline     • insulin glargine (LANTUS SOLOSTAR) 100 UNIT/ML Solution Pen-injector injection Inject 15 Units as instructed every evening. 5 PEN 3   • Ivabradine HCl (CORLANOR) 7.5 MG Tab  Take 7.5 mg by mouth 2 Times a Day. Needs to schedule follow up with Dr. Wilde 180 Tab 1   • levothyroxine (SYNTHROID) 100 MCG Tab Take 1 Tab by mouth Every morning on an empty stomach. (Patient taking differently: Take 75 mcg by mouth Every morning on an empty stomach.) 30 Tab 2   • vitamin D (VITAMIND D3) 1000 UNIT Tab Take 1 Tab by mouth every day. 60 Tab 0   • pregabalin (LYRICA) 300 MG capsule Take 300 mg by mouth 2 times a day.     • aspirin EC (ECOTRIN) 81 MG Tablet Delayed Response Take 81 mg by mouth every day.     • potassium chloride SA (KDUR) 20 MEQ Tab CR Take 20 mEq by mouth 2 times a day.     • tiotropium (SPIRIVA) 18 MCG Cap Inhale 1 Cap by mouth every day. 90 Cap 3   • ipratropium-albuterol (DUONEB) 0.5-2.5 (3) MG/3ML nebulizer solution 3 mL by Nebulization route every 6 hours as needed for Shortness of Breath. 60 Bullet 1   • etonogestrel (NEXPLANON) 68 MG Implant implant Inject 1 Each as instructed Once.     • mycophenolate (CELLCEPT) 500 MG tablet Take 1,000 mg by mouth 2 times a day.     • Dulaglutide (TRULICITY) 1.5 MG/0.5ML Solution Pen-injector Inject 1.5 mg as instructed every Friday.     • albuterol 108 (90 Base) MCG/ACT Aero Soln inhalation aerosol Inhale 2 Puffs by mouth every 6 hours as needed for Shortness of Breath.     • Melatonin 10 MG Tab Take 10 mg by mouth every evening.     • furosemide (LASIX) 80 MG Tab Take 80 mg by mouth 1 time daily as needed.       No current facility-administered medications on file prior to visit.      Social History     Socioeconomic History   • Marital status: Single     Spouse name: Not on file   • Number of children: Not on file   • Years of education: Not on file   • Highest education level: Not on file   Occupational History   • Not on file   Social Needs   • Financial resource strain: Not on file   • Food insecurity     Worry: Not on file     Inability: Not on file   • Transportation needs     Medical: Not on file     Non-medical: Not on file  "  Tobacco Use   • Smoking status: Never Smoker   • Smokeless tobacco: Never Used   Substance and Sexual Activity   • Alcohol use: No     Alcohol/week: 0.0 oz   • Drug use: Not Currently     Frequency: 7.0 times per week     Types: Marijuana   • Sexual activity: Not Currently     Birth control/protection: Pill   Lifestyle   • Physical activity     Days per week: Not on file     Minutes per session: Not on file   • Stress: Not on file   Relationships   • Social connections     Talks on phone: Not on file     Gets together: Not on file     Attends Scientologist service: Not on file     Active member of club or organization: Not on file     Attends meetings of clubs or organizations: Not on file     Relationship status: Not on file   • Intimate partner violence     Fear of current or ex partner: Not on file     Emotionally abused: Not on file     Physically abused: Not on file     Forced sexual activity: Not on file   Other Topics Concern   • Not on file   Social History Narrative    ** Merged History Encounter **          Breast Cancer-related family history is not on file.      Review of Systems   Constitutional: Negative for chills and fever.   HENT: Negative for congestion.    Respiratory: Negative for cough.    Gastrointestinal: Positive for diarrhea.   Genitourinary: Negative.    Musculoskeletal: Negative for joint pain.   Skin: Positive for itching and rash.   Neurological: Negative for tingling and sensory change.          Objective:     /74   Pulse 74   Temp 36.7 °C (98 °F) (Temporal)   Resp 18   Ht 1.651 m (5' 5\")   Wt 116.1 kg (256 lb)   SpO2 99%   BMI 42.60 kg/m²      Physical Exam  Constitutional:       Appearance: She is ill-appearing.   Cardiovascular:      Rate and Rhythm: Normal rate and regular rhythm.      Heart sounds: No murmur.   Pulmonary:      Effort: Pulmonary effort is normal.      Breath sounds: Normal breath sounds.   Musculoskeletal: Normal range of motion.   Skin:     General: Skin " is warm and dry.      Findings: Rash present.          Neurological:      General: No focal deficit present.      Mental Status: She is alert.                 Assessment/Plan:       1. Vaginitis and vulvovaginitis  nystatin/triamcinolone (MYCOLOG) 087077-2.1 UNIT/GM-% Cream    fluconazole (DIFLUCAN) 100 MG Tab     Differential diagnosis, natural history, supportive care, and indications for immediate follow-up discussed at length.

## 2020-08-02 LAB
BACTERIA UR CULT: NORMAL
SIGNIFICANT IND 70042: NORMAL
SITE SITE: NORMAL
SOURCE SOURCE: NORMAL

## 2020-08-02 NOTE — ED TRIAGE NOTES
Chief Complaint   Patient presents with   • Urinary Catheter Problem     jara catheter dislodged with balloon intact x2. Called urology office and told to come to ED for replacement.      Explained triage process, to waiting room. Asked to inform RN if questions or concerns arise.

## 2020-08-02 NOTE — ED NOTES
Patient brought to room in wheelchair with grandmother at bedside. Changed into hospital gown. Chart up for ERP.

## 2020-08-02 NOTE — ED PROVIDER NOTES
ED Provider Note    ER PROVIDER NOTE        CHIEF COMPLAINT  Chief Complaint   Patient presents with   • Urinary Catheter Problem     jara catheter dislodged with balloon intact x2. Called urology office and told to come to ED for replacement.        HPI  Kristin Balderrama is a 29 y.o. female who presents to the emergency department complaining of urinary catheter problem.  Patient has an indwelling Jara and it fell out twice today.  She denies any trauma, states that this just seems to happen occasionally.  She has somewhat complex medical history with transverse myelitis and is essentially bedbound requiring Jara catheter.  She has suprapubic placement scheduled.  No recent illness fevers chills cough congestion or other complaints    REVIEW OF SYSTEMS  Pertinent positives include Jara catheter malfunction. Pertinent negatives include no fever. See HPI for details. All other systems reviewed and are negative.    PAST MEDICAL HISTORY   has a past medical history of Abdominal pain, Anginal syndrome, Apnea, sleep, Arrhythmia, Arthritis, ASTHMA, Atrial fibrillation ([facility]ly [facility]), Back pain, Borderline personality disorder ([facility]), Breath shortness, Bronchitis, Cardiac arrhythmia, Chickenpox, Chronic UTI (9/18/2010), Cough, Daytime sleepiness, Depression, Diabetes ([facility]), Diarrhea, Disorder of thyroid, Fall, Fatigue, Frequent headaches, Gasping for breath, Gynecological disorder, Headache(784.0), Heart burn, History of falling, Hypertension, Indigestion, Migraine, 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[facility]), 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    SURGICAL HISTORY   has a past surgical history that includes neuro dest facet l/s w/ig sngl (4/21/2015); recovery (1/27/2016); delmar by laparoscopy (8/29/2010); lumbar fusion anterior (8/21/2012); other cardiac surgery (1/2016); tonsillectomy; bowel stimulator insertion (7/13/2016); gastroscopy with balloon dilatation (N/A, 1/4/2017); muscle biopsy (Right, 1/26/2017); other abdominal surgery; and laminotomy.    FAMILY HISTORY  Family History   Problem Relation Age of Onset   • Hypertension Mother    • Sleep Apnea Mother    • Heart Disease Mother    • Other Mother         hypothryod   • Hypertension Maternal Uncle    • Heart Disease Maternal Grandmother    • Hypertension Maternal Grandmother    • No Known Problems Sister    • Other Sister         Narcolepsy;fibromyalsia;bone;nerve   • Genitourinary () Problems Sister         endometriosis       SOCIAL HISTORY  Social History     Socioeconomic History   • Marital status: Single     Spouse name: Not on file   • Number of children: Not on file   • Years of education: Not on file   • Highest education level: Not on file   Occupational History   • Not on file   Social Needs   • Financial resource strain: Not on file   • Food insecurity     Worry: Not on file     Inability: Not on file   • Transportation needs     Medical: Not on file     Non-medical: Not on file   Tobacco Use   • Smoking status: Never Smoker   • Smokeless tobacco: Never Used   Substance and Sexual Activity   • Alcohol use: No     Alcohol/week: 0.0 oz   • Drug use: Not Currently     Frequency: 7.0 times per week     Types: Marijuana   • Sexual activity: Not Currently     Birth control/protection: Pill   Lifestyle   • Physical activity     Days per week: Not on file     Minutes per session: Not on file   • Stress: Not on file   Relationships   • Social connections     Talks on phone: Not on file     Gets together: Not on file     Attends Baptism service: Not on file     Active member of club  "or organization: Not on file     Attends meetings of clubs or organizations: Not on file     Relationship status: Not on file   • Intimate partner violence     Fear of current or ex partner: Not on file     Emotionally abused: Not on file     Physically abused: Not on file     Forced sexual activity: Not on file   Other Topics Concern   • Not on file   Social History Narrative    ** Merged History Encounter **           Social History     Substance and Sexual Activity   Drug Use Not Currently   • Frequency: 7.0 times per week   • Types: Marijuana       CURRENT MEDICATIONS  Home Medications    **Home medications have not yet been reviewed for this encounter**         ALLERGIES  Allergies   Allergen Reactions   • Depakote [Divalproex Sodium] Unspecified     Muscle spasms/muscle pain and weakness     • Amitriptyline Unspecified     Headaches     • Aripiprazole [Abilify] Unspecified     Headaches/muscle twitching     • Clindamycin Nausea     Even with food     • Flagyl [Metronidazole Hcl] Unspecified     \"eye problems\"     • Flomax [Tamsulosin Hydrochloride] Swelling   • Levaquin Unspecified     Severe muscle cramps in legs  RXN=unknown   • Metformin Unspecified     Increased lactic acid      • Tape Rash     Tears skin off  coban with Tegaderm tape ok intermittently  RXN=ongoing   • Vancomycin Itching     Pt becomes flushed in face and chest.   RXN=7/10/16   • Wound Dressing Adhesive Hives     By pt report   • Ciprofloxacin    • Hydromorphone Hcl      Pt states she feels loopy   • Keflex Rash     Pt states she gets a rash with this medication  Tolerates ceftriaxone   • Levofloxacin Unspecified     Leg muscle cramps   • Penicillins        PHYSICAL EXAM  VITAL SIGNS: /89   Pulse 83   Temp 36.8 °C (98.2 °F) (Temporal)   Resp 16   Ht 1.651 m (5' 5\")   Wt 116.1 kg (256 lb)   SpO2 95%   BMI 42.60 kg/m²   Pulse ox interpretation: I interpret this pulse ox as normal.    Constitutional: Alert in no apparent " distress.  HENT: No signs of trauma, Bilateral external ears normal, Nose normal.   Eyes: Pupils are equal and reactive, Conjunctiva normal, Non-icteric.   Cardiovascular: Regular rate and rhythm, no murmurs.   Thorax & Lungs: Normal breath sounds, No respiratory distress, No wheezing, No chest tenderness.   Abdomen: Bowel sounds normal, Soft, No tenderness, No masses, No pulsatile masses. No peritoneal signs.  Skin: Warm, Dry, No erythema, No rash. .   Psychiatric: Affect normal, Judgment normal, Mood normal.     DIAGNOSTIC STUDIES / PROCEDURES      RADIOLOGY  No orders to display     The radiologist's interpretation of all radiological studies have been reviewed and images independently viewed by me.    COURSE & MEDICAL DECISION MAKING  Nursing notes, VS, PMSFHx reviewed in chart.    6:12 PM Patient seen and examined at bedside.  Andrade catheter will be replaced and will plan for discharge        Decision Making:  This is a 29 y.o. female in after her catheter fell out.  This was replaced, and she will follow-up through her urologist office.  At this point no other symptoms were complaints to suggest other emergent pathology    The patient will return for new or worsening symptoms and is stable at the time of discharge.    The patient is referred to a primary physician for blood pressure management, diabetic screening, and for all other preventative health concerns.      DISPOSITION:  Patient will be discharged home in stable condition.    FOLLOW UP:  [facility]  [address] [facility] [address]  [facility] [facility] [address]  [phone]          OUTPATIENT MEDICATIONS:  New Prescriptions    No medications on file         FINAL IMPRESSION  1. Problem with Andrade catheter, initial encounter (HCC)          The note accurately reflects work and decisions made by me.  Torres Roberson M.D.  8/1/2020  6:33 PM

## 2020-08-03 ENCOUNTER — TELEPHONE (OUTPATIENT)
Dept: CARDIOLOGY | Facility: MEDICAL CENTER | Age: 30
End: 2020-08-03

## 2020-08-03 ENCOUNTER — NON-PROVIDER VISIT (OUTPATIENT)
Dept: CARDIOLOGY | Facility: MEDICAL CENTER | Age: 30
End: 2020-08-03
Payer: MEDICARE

## 2020-08-03 DIAGNOSIS — I48.91 ATRIAL FIBRILLATION, UNSPECIFIED TYPE (HCC): ICD-10-CM

## 2020-08-03 DIAGNOSIS — I49.3 PVC (PREMATURE VENTRICULAR CONTRACTION): ICD-10-CM

## 2020-08-03 NOTE — TELEPHONE ENCOUNTER
Patient enrolled in the 14 day Zio patch program per [facility] Slade.    In-Clinic hookup.  >Currently pending EOS.

## 2020-08-04 RX ORDER — SODIUM CHLORIDE 9 MG/ML
INJECTION, SOLUTION INTRAVENOUS CONTINUOUS
Status: CANCELLED | OUTPATIENT
Start: 2020-08-06

## 2020-08-05 ENCOUNTER — APPOINTMENT (OUTPATIENT)
Dept: RADIOLOGY | Facility: MEDICAL CENTER | Age: 30
End: 2020-08-05
Attending: EMERGENCY MEDICINE
Payer: MEDICARE

## 2020-08-05 ENCOUNTER — HOSPITAL ENCOUNTER (EMERGENCY)
Facility: MEDICAL CENTER | Age: 30
End: 2020-08-05
Attending: EMERGENCY MEDICINE | Admitting: EMERGENCY MEDICINE
Payer: MEDICARE

## 2020-08-05 ENCOUNTER — APPOINTMENT (OUTPATIENT)
Dept: ADMISSIONS | Facility: MEDICAL CENTER | Age: 30
End: 2020-08-05
Payer: MEDICARE

## 2020-08-05 VITALS
OXYGEN SATURATION: 100 % | HEART RATE: 62 BPM | DIASTOLIC BLOOD PRESSURE: 86 MMHG | RESPIRATION RATE: 18 BRPM | TEMPERATURE: 97.8 F | WEIGHT: 256 LBS | SYSTOLIC BLOOD PRESSURE: 169 MMHG | HEIGHT: 65 IN | BODY MASS INDEX: 42.65 KG/M2

## 2020-08-05 DIAGNOSIS — S82.892A CLOSED AVULSION FRACTURE OF LEFT ANKLE, INITIAL ENCOUNTER: ICD-10-CM

## 2020-08-05 DIAGNOSIS — T85.9XXA COMPLICATION OF CATHETER, INITIAL ENCOUNTER: ICD-10-CM
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
## 2020-08-05 DIAGNOSIS — R47.9 SPEECH DISTURBANCE, UNSPECIFIED TYPE: ICD-10-CM

## 2020-08-05 LAB — EKG IMPRESSION: NORMAL

## 2020-08-05 PROCEDURE — 99284 EMERGENCY DEPT VISIT MOD MDM: CPT

## 2020-08-05 PROCEDURE — 73610 X-RAY EXAM OF ANKLE: CPT | Mod: LT

## 2020-08-05 PROCEDURE — 70450 CT HEAD/BRAIN W/O DYE: CPT

## 2020-08-05 PROCEDURE — 93005 ELECTROCARDIOGRAM TRACING: CPT | Performed by: EMERGENCY MEDICINE

## 2020-08-05 ASSESSMENT — FIBROSIS 4 INDEX: FIB4 SCORE: 0.42

## 2020-08-05 NOTE — ED NOTES
Discharge orders received from ERP. Provided education on discharge instructions and diagnosis.Pt demonstrated understanding of education. Pt verbalized understanding of need for follow up appointment.  Pt wheeled off unit with all personal belongings. Patient's grandmother picked patient up from [facility]

## 2020-08-05 NOTE — DISCHARGE INSTRUCTIONS
You are seen in the emergency room after multiple falls and a change in your speech.  Your neurologic exam is otherwise reassuring.  Your CAT scan is also reassuring.  Your EKG does not show any high-risk features.  The x-ray of your ankle shows a possible tiny chip of bone concerning for an avulsion fracture.  You are being referred to orthopedics for follow-up.  Please follow-up closely with your regular doctor.  Your urinary catheter was replaced.  Please follow-up with your urologist.    Please return to the emergency department seek medical attention if you develop:  Vomiting, severe persistent headaches, any other new injuries, any other new or concerning findings    ================================  Coronavirus Information    Your visit did NOT appear to relate to coronavirus, but if you or your family develop symptoms that concern you for coronavirus (please see CDC website for symptoms), please contact the [facility] hotline (or your local health department)  or your healthcare provider before going to a medical facility:    [facility]  24hr hotline: [phone]    Information is available from the Centers for Disease Control and Prevention  www.CDC.gov    and     [facility]  [email or web address]    If you are severely ill or having a hard time breathing, please immediatly seek medical care. Notify the  or Emergency Department Triage about your symptoms.

## 2020-08-05 NOTE — ED PROVIDER NOTES
ED Provider Note    Scribed for Dayo Reynoso M.D. by Dee Bermeo. 8/5/2020,  2:51 AM.    Means of Arrival: EMS  History obtained from: Patient  History limited by: None    CHIEF COMPLAINT  Chief Complaint   Patient presents with   • Fall     frequent falls out of bed/wheelchar. did hit her head last night around dinner time. Patient states her head feels funny and she feels like her speech is different. Patient A&Ox4, speech clear upon eval   • Other     indwelling catheter came out about one hour ago.       HPI  Kristin Balderrama is a 29 y.o. female with a history of falls who presents to the Emergency Department via EMS after a recent fall that occurred last night. She states that she often falls out of bed due to rolling out of it, and has fallen recently four times; she hit her head on two of those falls and isn't sure if she lost consciousness during any of those incidents. She notes that she experienced speech changes after she hit her head the second time. The patient reports additional symptoms of worsening pressure in her head, jaw pain, and ankle pain, and denies fevers. She adds that her catheter came out after the fall and is requesting a new one. Per the patient, she uses a wheelchair to get around. No exacerbating or alleviating factors were noted with regards to her additional symptoms.      PPE Note: I personally donned full PPE for all patient encounters during this visit, including being clean-shaven with a mask, gloves, and eye protection. Scribe remained outside the patient's room and did not have any contact with the patient for the duration of patient encounter.        REVIEW OF SYSTEMS  CONSTITUTIONAL:  Jaw pain. No fever.  CARDIOVASCULAR:  No chest discomfort.  RESPIRATORY:  No pleuritic chest pain.  GASTROINTESTINAL:  No abdominal pain.  GENITOURINARY:   No dysuria.  MUSCULOSKELETAL:  Ankle pain, No arthralgia.  SKIN:  No rash or suspicious lesions.  NEUROLOGIC:   No  "headache.  ENDOCRINE:  No facial edema.  HEMATOLOGIC:  No abnormal bleeding.     See HPI for further details.   All other systems are negative.      PAST MEDICAL HISTORY  Past Medical History:   Diagnosis Date   • Abdominal pain    • Anginal syndrome     random chest pain especially with tachycardia   • Apnea, sleep    • Arrhythmia     \"sinus tachycardia\", cariologist, Dr. Kumar; ablation 2/2016   • Arthritis     osteo   • ASTHMA     when around smoke   • Atrial fibrillation (HCC)    • Back pain    • Borderline personality disorder (HCC)    • Breath shortness     with tachycardia   • Bronchitis    • Cardiac arrhythmia    • Chickenpox    • Chronic UTI 9/18/2010   • Cough    • Daytime sleepiness    • Depression    • Diabetes (HCC)    • Diarrhea    • Disorder of thyroid    • Fall    • Fatigue    • Frequent headaches    • Gasping for breath    • Gynecological disorder     PCOS   • Headache(784.0)    • Heart burn    • History of falling    • Hypertension    • Indigestion    • Migraine    • 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HCC)    • 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HCC)    • Nausea    • Obesity    • Other fatigue 6/29/2020   • Pain 08-15-12    back, 7/10   • Painful joint    • PCOS (polycystic ovarian syndrome)    • Pneumonia 2012   • Psychosis (HCC)    • Renal disorder     \"kidney disease, stage 1\" nephrologist, Dr. Vallejo   • Ringing in ears    • Scoliosis    • Shortness of breath    • Sinus tachycardia 10/31/2013   • Sleep apnea     CPAP \"pulmonary doctor took me off mid year 2016\"   • Snoring    • Tonsillitis    • 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HCC)    • Tuberculosis     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   • Urinary bladder disorder     Suprapubic cath   • Urinary incontinence    • Weakness    • Wears glasses        FAMILY HISTORY  Family History   Problem Relation Age of Onset   • Hypertension Mother    • Sleep Apnea Mother    • Heart Disease Mother    • Other Mother         hypothryod   • Hypertension Maternal Uncle    • Heart " Disease Maternal Grandmother    • Hypertension Maternal Grandmother    • No Known Problems Sister    • Other Sister         Narcolepsy;fibromyalsia;bone;nerve   • Genitourinary () Problems Sister         endometriosis       SOCIAL HISTORY   reports that she has never smoked. She has never used smokeless tobacco. She reports previous drug use. Frequency: 7.00 times per week. Drug: Marijuana. She reports that she does not drink alcohol.    SURGICAL HISTORY  Past Surgical History:   Procedure Laterality Date   • MUSCLE BIOPSY Right 1/26/2017    Procedure: MUSCLE BIOPSY - THIGH;  Surgeon: Isidro Vigil M.D.;  Location: SURGERY [facility];  Service:    • GASTROSCOPY WITH BALLOON DILATATION N/A 1/4/2017    Procedure: GASTROSCOPY WITH DILATATION;  Surgeon: Torres Vargas M.D.;  Location: [facility];  Service:    • BOWEL STIMULATOR INSERTION  7/13/2016    Procedure: BOWEL STIMULATOR INSERTION FOR PERMANENT INTERSTIM SACRAL IMPLANT;  Surgeon: Joe Noyola M.D.;  Location: [facility];  Service:    • RECOVERY  1/27/2016    Procedure: CATH LAB EP STUDY WITH SINUS NODE MODIFICATION ABHINAV;  Surgeon: [facility];  [place] [facility];  Service:    • OTHER CARDIAC SURGERY  1/2016    cardiac ablation   • NEURO DEST FACET L/S W/IG SNGL  4/21/2015    Performed by Reza Tabor at [facility]   • LUMBAR FUSION ANTERIOR  8/21/2012    Performed by SUSIE SAWANT at [facility]   • ALYSSA BY LAPAROSCOPY  8/29/2010    Performed by SHAYY JOHNS at [facility]   • LAMINOTOMY     • OTHER ABDOMINAL SURGERY     • TONSILLECTOMY      tonsillectomy       CURRENT MEDICATIONS  Current Outpatient Medications   Medication Instructions   • acetaminophen (TYLENOL) 500 mg, Oral, 4 TIMES DAILY PRN   • acetaZOLAMIDE (DIAMOX) 1,000-1,500 mg, Oral, 2 TIMES DAILY, 1500 mg in the AM<BR>1000 mg in the PM   • albuterol 108 (90 Base) MCG/ACT Aero Soln  inhalation aerosol 2 Puffs, Inhalation, EVERY 6 HOURS PRN   • aspirin EC (ECOTRIN) 81 mg, Oral, DAILY   • azithromycin (ZITHROMAX) 250 MG Tab Take 500 mg (2 Tabs) by mouth on day one; then take 250 mg (1 Tab) by mouth once daily for 4 days.   • busPIRone (BUSPAR) 10 MG Tab tablet TAKE ONE TABLET BY MOUTH TWICE DAILY   • cefdinir (OMNICEF) 300 MG Cap cefdinir 300 mg capsule   • Coenzyme Q10 300 mg, Oral, DAILY   • Corlanor 7.5 mg, Oral, 2 TIMES DAILY, Needs to schedule follow up with Dr. Wilde   • diphenhydrAMINE (BENADRYL) 25 mg, Oral, 2 TIMES DAILY WITH MEALS PRN, With the Doxycycline   • Dulaglutide (TRULICITY) 1.5 mg, Subcutaneous, FRIDAYS   • etonogestrel (NEXPLANON) 68 MG Implant implant 1 Each, Subcutaneous, ONCE   • fluconazole (DIFLUCAN) 100 MG Tab Take 2 tabs today and then 1 tab daily until gone.   • fluoxetine (PROZAC) 40 MG capsule TAKE ONE CAPSULE BY MOUTH ONCE A DAY   • furosemide (LASIX) 80 mg, Oral, 1 TIME DAILY PRN   • gabapentin (NEURONTIN) 300 mg, Oral, DAILY   • ipratropium-albuterol (DUONEB) 0.5-2.5 (3) MG/3ML nebulizer solution 3 mL, Nebulization, EVERY 6 HOURS PRN   • Lantus SoloStar 15 Units, Subcutaneous, EVERY EVENING   • levothyroxine (SYNTHROID) 100 mcg, Oral, EACH MORNING ON EMPTY STOMACH   • magnesium oxide (MAG-OX) 400 mg, Oral, DAILY   • Melatonin 10 mg, Oral, EVERY EVENING   • methocarbamol (ROBAXIN) 750 mg, Oral, 4 TIMES DAILY   • Mirabegron ER (MYRBETRIQ) 50 MG TABLET SR 24 HR Oral   • mycophenolate (CELLCEPT) 1,000 mg, Oral, 2 TIMES DAILY   • nitrofurantoin (MACRODANTIN) 50 MG Cap nitrofurantoin macrocrystal 50 mg capsule   Take 1 capsule as needed by oral route.   Take 1 capsule following sexual intercourse   • NON SPECIFIED Indications: D-Mannose   • nystatin (MYCOSTATIN) 742228 UNIT/GM Cream topical cream nystatin 100,000 unit/gram topical cream   APPLY TO THE AFFECTED AREA(S) BY TOPICAL ROUTE 2 TIMES PER DAY   • nystatin/triamcinolone (MYCOLOG) 450560-3.1 UNIT/GM-% Cream  "Apply to affected area 4 times daily.   • ondansetron (ZOFRAN ODT) 4 mg, Oral, EVERY 6 HOURS PRN   • OXcarbazepine (TRILEPTAL) 150 MG Tab TAKE ONE TABLET BY MOUTH TWICE DAILY   • potassium chloride SA (KDUR) 20 MEQ Tab CR 20 mEq, Oral, 2 TIMES DAILY   • predniSONE (DELTASONE) 20 mg, Oral, DAILY   • pregabalin (LYRICA) 300 mg, Oral, 2 TIMES DAILY   • prochlorperazine (COMPAZINE) 10 mg, Oral, EVERY 6 HOURS PRN   • Riboflavin 400 mg, Oral, DAILY   • Rizatriptan Benzoate (MAXALT-MLT) 5 mg, Oral, 2X A WEEK, Take 1 tablet at the onset of migraine can repeat up to 1 tab more in 24h no more than 10 tablets/mo   • sodium bicarbonate (SODIUM BICARBONATE) 650 mg, Oral, 4 TIMES DAILY   • tiotropium (SPIRIVA) 18 MCG Cap 1 Cap, Inhalation, DAILY   • topiramate (TOPAMAX) 25 mg, Oral, EVERY EVENING, Take 1 tablet before sleep nightly for 7 days then increase to 2 tablets before bedtime after that   • traZODone (DESYREL) 100 MG Tab TAKE  ONE TABLET BY MOUTH NIGHTLY AT BEDTIME   • vitamin D (VITAMIND D3) 1,000 Units, Oral, DAILY   • ziprasidone (GEODON) 40 MG Cap TAKE ONE CAPSULE BY MOUTH TWICE DAILY       ALLERGIES  Allergies   Allergen Reactions   • Depakote [Divalproex Sodium] Unspecified     Muscle spasms/muscle pain and weakness     • Amitriptyline Unspecified     Headaches     • Aripiprazole [Abilify] Unspecified     Headaches/muscle twitching     • Clindamycin Nausea     Even with food     • Flagyl [Metronidazole Hcl] Unspecified     \"eye problems\"     • Flomax [Tamsulosin Hydrochloride] Swelling   • Levaquin Unspecified     Severe muscle cramps in legs  RXN=unknown   • Metformin Unspecified     Increased lactic acid      • Tape Rash     Tears skin off  coban with Tegaderm tape ok intermittently  RXN=ongoing   • Vancomycin Itching     Pt becomes flushed in face and chest.   RXN=7/10/16   • Wound Dressing Adhesive Hives     By pt report   • Ciprofloxacin    • Hydromorphone Hcl      Pt states she feels loopy   • Keflex Rash     " "Pt states she gets a rash with this medication  Tolerates ceftriaxone   • Levofloxacin Unspecified     Leg muscle cramps   • Penicillins        PHYSICAL EXAM  VITAL SIGNS: /63   Pulse 62   Temp 36.6 °C (97.8 °F) (Temporal)   Resp 18   Ht 1.651 m (5' 5\")   Wt 116.1 kg (256 lb)   SpO2 100%   BMI 42.60 kg/m²    Gen: Alert, no acute distress  HEENT: ATNC, Normal oropharynx  Eyes: PERRL, EOMI, normal conjunctiva.   Neck: trachea midline  Resp: Normal lungs, no respiratory distress  CV: Normal rate and rhythm, no murmurs No JVD  Abd: No abdominal tenderness non-distended  Ext: No deformities. Moves all extremities  Psych: normal mood  Neuro: speech fluent but with a lisp, GCS 15    DIAGNOSTIC STUDIES / PROCEDURES     EKG  Results for orders placed or performed during the hospital encounter of 20   EKG (NOW)   Result Value Ref Range    Report       [facility] Emergency Dept.    Test Date:  2020  Pt Name:    HARLEY DE LA CRUZ              Department: ER  MRN:        [id number]                      Room:       [facility]  Gender:     Female                       Technician: [id number]  :        1990                   Requested By:DAYO REYNOSO  Order #:    [id number]                    Reading MD: Dayo Reynoso    Measurements  Intervals                                Axis  Rate:       60                           P:          8  ND:         160                          QRS:        14  QRSD:       98                           T:          8  QT:         436  QTc:        436    Interpretive Statements  SINUS RHYTHM  BORDERLINE T ABNORMALITIES, ANTERIOR LEADS  Compared to ECG 2020 18:27:16  No significant changes  Electronically Signed On 2020 4:05:04 PDT by Dayo Reynoso       *Note: Due to a large number of results and/or encounters for the requested time period, some results have not been displayed. A complete set of results can be found in Results Review.       RADIOLOGY  CT-HEAD W/O "   Final Result         1.  No acute intracranial abnormality.      DX-ANKLE 3+ VIEWS LEFT   Final Result         1.  Hazy ossific density adjacent to the tip of the medial malleolus, appearance suggests possible subtle ligamentous avulsion fracture fragment.           The radiologist’s interpretation of all radiology studies have been reviewed by me.    COURSE & MEDICAL DECISION MAKING  Pertinent Labs & Imaging studies reviewed. (See chart for details)    2:51 AM Patient seen and examined at bedside. Ordered for imaging to evaluate.     5:14 AM Patient was reevaluated at bedside. Discussed lab and radiology results with the patient and informed them of my plans for discharge. The patient understands and agrees      Medical Decision Making:  Pt with falls, no high risk features on PE or EKG for cardiac syncope. Does not appear consistent with seizure. No TBI. Possible ankle Fx, splint, F/U with ortho. No focal neuro deficits to suggest stroke.     The patient will return for new or worsening symptoms and is stable at the time of discharge.    The patient is referred to a primary physician for blood pressure management, diabetic screening, and for all other preventative health concerns.      DISPOSITION:  Patient will be discharged home in stable condition.    FOLLOW UP:  Torres Brody M.D.  [address] [facility] [address]  [facility] [address]  [phone]    Schedule an appointment as soon as possible for a visit       Katelyn Pemberton M.D.  [address] [facility] [id number] [phone]    Schedule an appointment as soon as possible for a visit         OUTPATIENT MEDICATIONS:  Discharge Medication List as of 8/5/2020  5:02 AM            FINAL IMPRESSION  1. Fall, initial encounter    2. Speech disturbance, unspecified type    3. Complication of catheter, initial encounter    4. Closed avulsion fracture of left ankle, initial encounter            I, Dee Bermeo (Scribe), am scribing for, and in the presence of,  Dayo Reynoso M.D..    Electronically signed by: Dee Bermeo (Scribe), 8/5/2020    IDayo M.D. personally performed the services described in this documentation, as scribed by Dee Bermeo in my presence, and it is both accurate and complete.    C    The note accurately reflects work and decisions made by me.  Dayo Reynoso M.D.  8/5/2020  7:19 AM

## 2020-08-05 NOTE — ED TRIAGE NOTES
"Chief Complaint   Patient presents with   • Fall     frequent falls out of bed/wheelchar. did hit her head last night around dinner time. Patient states her head feels funny and she feels like her speech is different. Patient A&Ox4, speech clear upon eval   • Other     indwelling catheter came out about one hour ago.     /63   Pulse 62   Temp 36.6 °C (97.8 °F) (Temporal)   Resp 18   Ht 1.651 m (5' 5\")   Wt 116.1 kg (256 lb)   SpO2 100%   BMI 42.60 kg/m²     Patient is a wheelchair bound patient BIBEMS from home after stating her indwelling catheter came out last night. While EMS was there the grandmother states that the patient is having surgery for a suprapubic catheter placement on Thursday and has also been falling more frequent. Patient has multiple complaints including, left ankle pain, difficulty with speech, headache, catheter problem. Patient A&Ox4, has been off blood thinners for a week in preparations for surgery.  "

## 2020-08-06 ENCOUNTER — HOSPITAL ENCOUNTER (OUTPATIENT)
Facility: MEDICAL CENTER | Age: 30
End: 2020-08-06
Attending: UROLOGY | Admitting: UROLOGY
Payer: MEDICARE

## 2020-08-06 ENCOUNTER — APPOINTMENT (OUTPATIENT)
Dept: RADIOLOGY | Facility: MEDICAL CENTER | Age: 30
End: 2020-08-06
Attending: UROLOGY
Payer: MEDICARE

## 2020-08-06 VITALS
HEIGHT: 65 IN | WEIGHT: 256.39 LBS | OXYGEN SATURATION: 98 % | BODY MASS INDEX: 42.72 KG/M2 | SYSTOLIC BLOOD PRESSURE: 125 MMHG | HEART RATE: 70 BPM | TEMPERATURE: 98.1 F | RESPIRATION RATE: 16 BRPM | DIASTOLIC BLOOD PRESSURE: 72 MMHG

## 2020-08-06 DIAGNOSIS — R33.9 RETENTION OF URINE, UNSPECIFIED: ICD-10-CM

## 2020-08-06 LAB
GLUCOSE BLD-MCNC: 75 MG/DL (ref 65–99)
HCG SERPL QL: NEGATIVE
INR PPP: 1.09 (ref 0.87–1.13)
PROTHROMBIN TIME: 14.4 SEC (ref 12–14.6)

## 2020-08-06 PROCEDURE — 700111 HCHG RX REV CODE 636 W/ 250 OVERRIDE (IP): Performed by: RADIOLOGY

## 2020-08-06 PROCEDURE — 85610 PROTHROMBIN TIME: CPT

## 2020-08-06 PROCEDURE — 99153 MOD SED SAME PHYS/QHP EA: CPT

## 2020-08-06 PROCEDURE — 160002 HCHG RECOVERY MINUTES (STAT)

## 2020-08-06 PROCEDURE — 700111 HCHG RX REV CODE 636 W/ 250 OVERRIDE (IP)

## 2020-08-06 PROCEDURE — 82962 GLUCOSE BLOOD TEST: CPT

## 2020-08-06 PROCEDURE — 84703 CHORIONIC GONADOTROPIN ASSAY: CPT

## 2020-08-06 PROCEDURE — 700117 HCHG RX CONTRAST REV CODE 255: Performed by: RADIOLOGY

## 2020-08-06 RX ORDER — SODIUM CHLORIDE 9 MG/ML
500 INJECTION, SOLUTION INTRAVENOUS
Status: DISCONTINUED | OUTPATIENT
Start: 2020-08-06 | End: 2020-08-06 | Stop reason: HOSPADM

## 2020-08-06 RX ORDER — MIDAZOLAM HYDROCHLORIDE 1 MG/ML
.5-2 INJECTION INTRAMUSCULAR; INTRAVENOUS PRN
Status: DISCONTINUED | OUTPATIENT
Start: 2020-08-06 | End: 2020-08-06 | Stop reason: HOSPADM

## 2020-08-06 RX ORDER — SODIUM CHLORIDE 9 MG/ML
INJECTION, SOLUTION INTRAVENOUS CONTINUOUS
Status: DISCONTINUED | OUTPATIENT
Start: 2020-08-06 | End: 2020-08-06 | Stop reason: HOSPADM

## 2020-08-06 RX ORDER — ONDANSETRON 2 MG/ML
4 INJECTION INTRAMUSCULAR; INTRAVENOUS EVERY 8 HOURS PRN
Status: DISCONTINUED | OUTPATIENT
Start: 2020-08-06 | End: 2020-08-06 | Stop reason: HOSPADM

## 2020-08-06 RX ORDER — MIDAZOLAM HYDROCHLORIDE 1 MG/ML
INJECTION INTRAMUSCULAR; INTRAVENOUS
Status: COMPLETED
Start: 2020-08-06 | End: 2020-08-06

## 2020-08-06 RX ORDER — NALOXONE HYDROCHLORIDE 0.4 MG/ML
INJECTION, SOLUTION INTRAMUSCULAR; INTRAVENOUS; SUBCUTANEOUS
Status: COMPLETED
Start: 2020-08-06 | End: 2020-08-06

## 2020-08-06 RX ORDER — ONDANSETRON 2 MG/ML
4 INJECTION INTRAMUSCULAR; INTRAVENOUS PRN
Status: DISCONTINUED | OUTPATIENT
Start: 2020-08-06 | End: 2020-08-06 | Stop reason: HOSPADM

## 2020-08-06 RX ADMIN — MIDAZOLAM HYDROCHLORIDE 1 MG: 1 INJECTION, SOLUTION INTRAMUSCULAR; INTRAVENOUS at 11:54

## 2020-08-06 RX ADMIN — MIDAZOLAM HYDROCHLORIDE 1 MG: 1 INJECTION, SOLUTION INTRAMUSCULAR; INTRAVENOUS at 12:05

## 2020-08-06 RX ADMIN — FENTANYL CITRATE 25 MCG: 50 INJECTION INTRAMUSCULAR; INTRAVENOUS at 12:05

## 2020-08-06 RX ADMIN — FENTANYL CITRATE 50 MCG: 50 INJECTION INTRAMUSCULAR; INTRAVENOUS at 11:54

## 2020-08-06 RX ADMIN — MIDAZOLAM HYDROCHLORIDE 1 MG: 1 INJECTION, SOLUTION INTRAMUSCULAR; INTRAVENOUS at 12:10

## 2020-08-06 RX ADMIN — IOHEXOL 50 ML: 300 INJECTION, SOLUTION INTRAVENOUS at 12:40

## 2020-08-06 RX ADMIN — FENTANYL CITRATE 25 MCG: 50 INJECTION INTRAMUSCULAR; INTRAVENOUS at 12:10

## 2020-08-06 RX ADMIN — MIDAZOLAM HYDROCHLORIDE 1 MG: 1 INJECTION, SOLUTION INTRAMUSCULAR; INTRAVENOUS at 11:59

## 2020-08-06 RX ADMIN — FENTANYL CITRATE 50 MCG: 50 INJECTION INTRAMUSCULAR; INTRAVENOUS at 11:59

## 2020-08-06 ASSESSMENT — FIBROSIS 4 INDEX: FIB4 SCORE: 0.42

## 2020-08-06 NOTE — OR NURSING
1238   PT RECEIVED FROM IR,  S/P SUPRAPUBIC CATH INSERTION AND DC OF OLD FORRESTER.  PT IS A/A/OX4.  DENIES ANY PAIN.   SUPRAPUBIC CATH CONNECTING TO LEG BAG DRAINING CLEAR YELLOW URINE.    1300  PT RESTING COMFORTABLY.  SUPRAPUBIC CATH SITE IS CLEAR.    1400   CALL TO PT'S AUNT AND GIVEN DC INSTRUCTIONS OVER THE PHONE.  DC INSTRUCTIONS ALSO GIVEN TO PT.  EXTRA FORRESTER BAG GIVEN TO PT PER PT'S REQUEST.  PT HAD SUPRAPUBIC CATH BEFORE,  THEREFORE UNDERSTANDING ON HOW TO MANAGE SUPRAPUBIC CATH.  HL DC. PT DC TO HOME,  VIA HER OWN WHEELCHAIR,  ESCORTED OUT BY CNA.

## 2020-08-06 NOTE — H&P
"History and Physical    Date: 8/6/2020    PCP: Torres Brody M.D.      CC: URINARY RETENTION, HX OF CRION    HPI: This is a 29 y.o. female who is presenting FOR SUPRAPUBIC BLADDER CATHETER PLACEMENT    Past Medical History:   Diagnosis Date   • Abdominal pain    • Anginal syndrome     random chest pain especially with tachycardia   • Apnea, sleep    • Arrhythmia     \"sinus tachycardia\", cariologist, Dr. Kumar; ablation 2/2016   • Arthritis     osteo   • ASTHMA     when around smoke   • Atrial fibrillation (HCC)    • Back pain    • Borderline personality disorder (HCC)    • Breath shortness     with tachycardia   • Bronchitis    • Cardiac arrhythmia    • Chickenpox    • Chronic UTI 9/18/2010   • Cough    • Daytime sleepiness    • Depression    • Diabetes (HCC)    • Diarrhea    • Disorder of thyroid    • Fall    • Fatigue    • Frequent headaches    • Gasping for breath    • Gynecological disorder     PCOS   • Headache(784.0)    • Heart burn    • History of falling    • Hypertension    • Indigestion    • Migraine    • 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HCC)    • 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HCC)    • Nausea    • Obesity    • Other fatigue 6/29/2020   • Pain 08-15-12    back, 7/10   • Painful joint    • PCOS (polycystic ovarian syndrome)    • Pneumonia 2012   • Psychosis (HCC)    • Renal disorder     \"kidney disease, stage 1\" nephrologist, Dr. Vallejo   • Ringing in ears    • Scoliosis    • Shortness of breath    • Sinus tachycardia 10/31/2013   • Sleep apnea     CPAP \"pulmonary doctor took me off mid year 2016\"   • Snoring    • Tonsillitis    • 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HCC)    • Tuberculosis     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   • Urinary bladder disorder     Suprapubic cath   • Urinary incontinence    • Weakness    • Wears glasses        Past Surgical History:   Procedure Laterality Date   • MUSCLE BIOPSY Right 1/26/2017    Procedure: MUSCLE BIOPSY - THIGH;  Surgeon: Isidro Vigil M.D.;  Location: SURGERY [facility]" ORS;  Service:    • GASTROSCOPY WITH BALLOON DILATATION N/A 1/4/2017    Procedure: GASTROSCOPY WITH DILATATION;  Surgeon: Torres Vargas M.D.;  Location: SURGERY [facility];  Service:    • BOWEL STIMULATOR INSERTION  7/13/2016    Procedure: BOWEL STIMULATOR INSERTION FOR PERMANENT INTERSTIM SACRAL IMPLANT;  Surgeon: Joe Noyola M.D.;  Location: SURGERY [facility];  Service:    • RECOVERY  1/27/2016    Procedure: CATH LAB EP STUDY WITH SINUS NODE MODIFICATION ABHINAV;  Surgeon: [facility];  Location: SURG[place] [facility] [place];  Service:    • OTHER CARDIAC SURGERY  1/2016    cardiac ablation   • NEURO DEST FACET L/S W/IG SNGL  4/21/2015    Performed by Reza Tabor at SURGERY [facility] ORS   • LUMBAR FUSION ANTERIOR  8/21/2012    Performed by SUSIE SAWANT at SURGERY [facility] ORS   • ALYSSA BY LAPAROSCOPY  8/29/2010    Performed by SHAYY JOHNS at SURGERY [facility] ORS   • LAMINOTOMY     • OTHER ABDOMINAL SURGERY     • TONSILLECTOMY      tonsillectomy       Current Facility-Administered Medications   Medication Dose Route Frequency Provider Last Rate Last Dose   • NS infusion   Intravenous Continuous Ursula GIULIANO Kumar, A.P.N.            Social History     Socioeconomic History   • Marital status: Single     Spouse name: Not on file   • Number of children: Not on file   • Years of education: Not on file   • Highest education level: Not on file   Occupational History   • Not on file   Social Needs   • Financial resource strain: Not on file   • Food insecurity     Worry: Not on file     Inability: Not on file   • Transportation needs     Medical: Not on file     Non-medical: Not on file   Tobacco Use   • Smoking status: Never Smoker   • Smokeless tobacco: Never Used   Substance and Sexual Activity   • Alcohol use: No     Alcohol/week: 0.0 oz   • Drug use: Not Currently     Frequency: 7.0 times per week     Types: Marijuana   • Sexual activity: Not Currently     Birth  control/protection: Pill   Lifestyle   • Physical activity     Days per week: Not on file     Minutes per session: Not on file   • Stress: Not on file   Relationships   • Social connections     Talks on phone: Not on file     Gets together: Not on file     Attends [employer] service: Not on file     Active member of club or organization: Not on file     Attends meetings of clubs or organizations: Not on file     Relationship status: Not on file   • Intimate partner violence     Fear of current or ex partner: Not on file     Emotionally abused: Not on file     Physically abused: Not on file     Forced sexual activity: Not on file   Other Topics Concern   • Not on file   Social History Narrative    ** Merged History Encounter **            Family History   Problem Relation Age of Onset   • Hypertension Mother    • Sleep Apnea Mother    • Heart Disease Mother    • Other Mother         hypothryod   • Hypertension Maternal Uncle    • Heart Disease Maternal Grandmother    • Hypertension Maternal Grandmother    • No Known Problems Sister    • Other Sister         Narcolepsy;fibromyalsia;bone;nerve   • Genitourinary () Problems Sister         endometriosis       Allergies:  Depakote [divalproex sodium], Amitriptyline, Aripiprazole [abilify], Clindamycin, Flagyl [metronidazole hcl], Flomax [tamsulosin hydrochloride], Levaquin, Metformin, Tape, Vancomycin, Wound dressing adhesive, Ciprofloxacin, Hydromorphone hcl, Keflex, Levofloxacin, and Penicillins    Review of Systems:  Negative except for LEFT FOOT/ANKLE FX WITH SHORT LEG CAST. ALSO GIVES HX OF PRIOR BLADDER INFECTIONS WITH SEPSIS.    Physical Exam   Constitutional: She appears well-developed and well-nourished.   HENT:   Head: Normocephalic and atraumatic.   Cardiovascular: Normal rate.   Pulmonary/Chest: Effort normal.   Musculoskeletal:      Comments: CAST ON LEFT LEG   Neurological: She is alert.   Skin: Skin is warm and dry.   Psychiatric: She has a normal mood and  affect. Her behavior is normal.       Vital Signs  Blood Pressure: 150/94   Temperature: 35.8 °C (96.5 °F)   Pulse: 73   Respiration: 20   Pulse Oximetry: 99 %        Labs:          Recent Labs     08/06/20  0830   INR 1.09     Recent Labs     08/06/20  0830   INR 1.09       Radiology:  IR-DRAIN-BLADDER SUPRAPUB W/CATH    (Results Pending)             Assessment and Plan:This is a 29 y.o. HERE FOR SUPRAPUBIC BLADDER CATHETER PLACEMENT

## 2020-08-06 NOTE — PROGRESS NOTES
IR RN note    Patient underwent a suprapubic catheter placement by Dr. Golden.  Procedure site was verified by MD using imaging guidance. Consent was signed.  IR moe Perez and Cara assisted. Patient was placed in a supine position.  Vitals were taken every 5 minutes and remained stable during procedure (see doc flow sheet for results).  CO2 waveform capnography was monitored throughout procedure, see chart.  Lower Abdomen access site.   A gauze and metapore tape dressing was placed over surgical site. Report called to [place] HASSAN. Pt transported by ivan with RN to PPU, with monitor .   Reviewed sedation orders with MD DÍAZ procedure ended at 1215    Andrade removed  Lubri-Ana All silicone Andrade Catheter Latex free  REF # [id number]  LOT # [id number]  Exp. 10-

## 2020-08-06 NOTE — DISCHARGE INSTRUCTIONS
ACTIVITY: Rest and take it easy for the first 24 hours.  A responsible adult is recommended to remain with you during that time.  It is normal to feel sleepy.  We encourage you to not do anything that requires balance, judgment or coordination.    MILD FLU-LIKE SYMPTOMS ARE NORMAL. YOU MAY EXPERIENCE GENERALIZED MUSCLE ACHES, THROAT IRRITATION, HEADACHE AND/OR SOME NAUSEA.    FOR 24 HOURS DO NOT:  Drive, operate machinery or run household appliances.  Drink beer or alcoholic beverages.   Make important decisions or sign legal documents.    SPECIAL INSTRUCTIONS: *KEEP NEPH TUBE SITE C/D/I WITH DRESSING DRY.  **    DIET: To avoid nausea, slowly advance diet as tolerated, avoiding spicy or greasy foods for the first day.  Add more substantial food to your diet according to your physician's instructions.  Babies can be fed formula or breast milk as soon as they are hungry.  INCREASE FLUIDS AND FIBER TO AVOID CONSTIPATION.    SURGICAL DRESSING/BATHING: *SPONGE BATH ONLY.  EMPTY LEGBAG PRN**    FOLLOW-UP APPOINTMENT:  A follow-up appointment should be arranged with your doctor in *[facility]   [phone]  **; call to schedule.    You should CALL YOUR PHYSICIAN if you develop:  Fever greater than 101 degrees F.  Pain not relieved by medication, or persistent nausea or vomiting.  Excessive bleeding (blood soaking through dressing) or unexpected drainage from the wound.  Extreme redness or swelling around the incision site, drainage of pus or foul smelling drainage.  Inability to urinate or empty your bladder within 8 hours.  Problems with breathing or chest pain.    You should call 911 if you develop problems with breathing or chest pain.  If you are unable to contact your doctor or surgical center, you should go to the nearest emergency room or urgent care center.  Physician's telephone #: *[phone]**    If any questions arise, call your doctor.  If your doctor is not available, please feel free to call the Surgical Center at  [phone].  The Center is open Monday through Friday from 7AM to 7PM.  You can also call the HEALTH HOTLINE open 24 hours/day, 7 days/week and speak to a nurse at [phone], or toll free at [phone].    A registered nurse may call you a few days after your surgery to see how you are doing after your procedure.    MEDICATIONS: Resume taking daily medication.  Take prescribed pain medication with food.  If no medication is prescribed, you may take non-aspirin pain medication if needed.  PAIN MEDICATION CAN BE VERY CONSTIPATING.  Take a stool softener or laxative such as senokot, pericolace, or milk of magnesia if needed.      If your physician has prescribed pain medication that includes Acetaminophen (Tylenol), do not take additional Acetaminophen (Tylenol) while taking the prescribed medication.    Depression / Suicide Risk    As you are discharged from this [facility] facility, it is important to learn how to keep safe from harming yourself.    Recognize the warning signs:  · Abrupt changes in personality, positive or negative- including increase in energy   · Giving away possessions  · Change in eating patterns- significant weight changes-  positive or negative  · Change in sleeping patterns- unable to sleep or sleeping all the time   · Unwillingness or inability to communicate  · Depression  · Unusual sadness, discouragement and loneliness  · Talk of wanting to die  · Neglect of personal appearance   · Rebelliousness- reckless behavior  · Withdrawal from people/activities they love  · Confusion- inability to concentrate     If you or a loved one observes any of these behaviors or has concerns about self-harm, here's what you can do:  · Talk about it- your feelings and reasons for harming yourself  · Remove any means that you might use to hurt yourself (examples: pills, rope, extension cords, firearm)  · Get professional help from the community (Mental Health, Substance Abuse, psychological  counseling)  · Do not be alone:Call your Safe Contact- someone whom you trust who will be there for you.  · Call your local CRISIS HOTLINE 095-8033 or 580-846-8168  · Call your local Children's Mobile Crisis Response Team [place] (345) 632-0160 or www.[employer]  · Call the toll free National Suicide Prevention Hotlines   · National Suicide Prevention Lifeline 441-066-HNJJ (3405)  · National Hope Line Network 800-SUICIDE (602-9122)

## 2020-08-06 NOTE — OR SURGEON
Immediate Post- Operative Note    PostOp Diagnosis: NEUROGENIC BLADDER URINARY RETENTION      Procedure(s): U/S AND FLUOROSCOPIC GUIDED SUPRAPUBIC 18F  SILICONE FORRESTER BLADDER CATHETER PLACEMENT AND CYSTOGRAM. 5 ML BALLOON FILLED WITH 5 ML SALINE.     TRANSURETHRAL FORRESTER CATHETER DC'ED      Estimated Blood Loss: <5CC        Complications: NONE            8/6/2020     12:38 PM     Ben Golden M.D.

## 2020-08-08 ENCOUNTER — HOSPITAL ENCOUNTER (EMERGENCY)
Facility: MEDICAL CENTER | Age: 30
End: 2020-08-09
Attending: EMERGENCY MEDICINE | Admitting: EMERGENCY MEDICINE
Payer: MEDICARE

## 2020-08-08 DIAGNOSIS — T83.010A SUPRAPUBIC CATHETER DYSFUNCTION, INITIAL ENCOUNTER (HCC): ICD-10-CM

## 2020-08-08 PROCEDURE — 99284 EMERGENCY DEPT VISIT MOD MDM: CPT

## 2020-08-08 ASSESSMENT — FIBROSIS 4 INDEX: FIB4 SCORE: 0.42

## 2020-08-09 VITALS
WEIGHT: 257 LBS | HEIGHT: 65 IN | RESPIRATION RATE: 16 BRPM | SYSTOLIC BLOOD PRESSURE: 123 MMHG | DIASTOLIC BLOOD PRESSURE: 68 MMHG | OXYGEN SATURATION: 98 % | BODY MASS INDEX: 42.82 KG/M2 | TEMPERATURE: 97.6 F | HEART RATE: 66 BPM

## 2020-08-09 NOTE — DISCHARGE PLANNING
Medical Social Work     SW received a call from the RN requesting SW assistance with [facility] transportation. KRYSTIAN called [facility] and spoke with yoni and obtained authorization. KRYSTIAN called [facility] and set up transportation through [employer]. RN aware of transportation time.     The pt is going home to the address of: [address] [place].

## 2020-08-09 NOTE — ED TRIAGE NOTES
"Chief Complaint   Patient presents with   • Urinary Catheter Problem     Patient states she thinks she had \"less urine output than usual today\". Patient currently has 600mL in her bag and last emptied it at 9am. Patient also states she thinks her catheter is \"out farther than normal\". Patient states she was lying down and felt a \"big push and the bed was saturated with urine\". She thinks it came from her suprapubic site. Patient is in 10/10 pain.      /61   Pulse 69   Temp 36.4 °C (97.6 °F) (Temporal)   Resp 18   Ht 1.651 m (5' 5\")   Wt 116.6 kg (257 lb)   SpO2 98%   BMI 42.77 kg/m²     Patient was BIB EMS for the above complaint. Patient is afebrile. VSS. Patient had suprapubic cath placed on Thursday 8/6. Urine sample collected and sent to lab. Chart up for ERP.   "

## 2020-08-09 NOTE — ED NOTES
Discharge instructions reviewed and signed with patient. No questions at this time. Patient lefty via [facility] home.

## 2020-08-09 NOTE — ED PROVIDER NOTES
"ED Provider Note    Scribed for Phuong Calderon M.D. by Alejandrina Roa. 8/9/2020  12:10 AM    Means of arrival: EMS  History of obtained from: Patient  History limited by: None    CHIEF COMPLAINT  Chief Complaint   Patient presents with   • Urinary Catheter Problem     Patient states she thinks she had \"less urine output than usual today\". Patient currently has 600mL in her bag and last emptied it at 9am. Patient also states she thinks her catheter is \"out farther than normal\". Patient states she was lying down and felt a \"big push and the bed was saturated with urine\". She thinks it came from her suprapubic site. Patient is in 10/10 pain.        HPI  Kristin Balderrama is a 29 y.o. female who presents to the Emergency Department for urinary catheter problems acute onset 7 hours ago. She recently had a suprapubic catheter placement on 8/6/20.The patient states that it felt as if something was blocking her catheter and she ignored that feeling for a while. She then explains that urine exploded from the catheter site while she was in bed. The said explosion from the catheter saturated the bed in urine. She states that it is painful when she moves. She has associated symptoms of nausea and had 2 episodes of emesis prior to arrival. She is concerned that the catheter could be pulled out. She denies chest pain, cough, fever, or COVID exposure.     REVIEW OF SYSTEMS  Pertinent positives include urinary catheter problem and suprapubic pain. Pertinent negative include  chest pain, cough, fever, or COVID exposure.     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    SOCIAL HISTORY  Social History     Tobacco Use   • Smoking status: Never Smoker   • Smokeless tobacco: Never Used   Substance and Sexual Activity   • Alcohol use: No     Alcohol/week: 0.0 oz   • Drug use: Not Currently     Frequency: 7.0 times per week     Types: Marijuana   • Sexual activity: Not Currently     Birth control/protection: Pill       SURGICAL HISTORY   has a past surgical history that includes neuro dest facet l/s w/ig sngl (4/21/2015); recovery (1/27/2016); delmar by laparoscopy (8/29/2010); lumbar fusion anterior (8/21/2012); other cardiac surgery (1/2016); tonsillectomy; bowel stimulator insertion (7/13/2016); gastroscopy with balloon dilatation (N/A, 1/4/2017); muscle biopsy (Right, 1/26/2017); other abdominal surgery; and laminotomy.    CURRENT MEDICATIONS  Current Outpatient Medications   Medication Instructions   • acetaminophen (TYLENOL) 500 mg, Oral, 4 TIMES DAILY PRN   • acetaZOLAMIDE (DIAMOX) 1,000-1,500 mg, Oral, 2 TIMES DAILY, 1500 mg in the AM<BR>1000 mg in the PM   • albuterol 108 (90 Base) MCG/ACT Aero Soln inhalation aerosol 2 Puffs, Inhalation, EVERY 6 HOURS PRN   • aspirin EC (ECOTRIN) 81 mg, Oral, DAILY   • azithromycin (ZITHROMAX) 250 MG Tab Take 500 mg (2 Tabs) by mouth on day one; then take 250 mg (1 Tab) by mouth once daily for 4 days.   • busPIRone (BUSPAR) 10 MG Tab tablet TAKE ONE  TABLET BY MOUTH TWICE DAILY   • cefdinir (OMNICEF) 300 MG Cap cefdinir 300 mg capsule   • Coenzyme Q10 300 mg, Oral, DAILY   • Corlanor 7.5 mg, Oral, 2 TIMES DAILY, Needs to schedule follow up with Dr. Wilde   • diphenhydrAMINE (BENADRYL) 25 mg, Oral, 2 TIMES DAILY WITH MEALS PRN, With the Doxycycline   • Dulaglutide (TRULICITY) 1.5 mg, Subcutaneous, FRIDAYS   • etonogestrel (NEXPLANON) 68 MG Implant implant 1 Each, Subcutaneous, ONCE   • fluconazole (DIFLUCAN) 100 MG Tab Take 2 tabs today and then 1 tab daily until gone.   • fluoxetine (PROZAC) 40 MG capsule TAKE ONE CAPSULE BY MOUTH ONCE A DAY   • furosemide (LASIX) 80 mg, Oral, 1 TIME DAILY PRN   • gabapentin (NEURONTIN) 300 mg, Oral, DAILY   • ipratropium-albuterol (DUONEB) 0.5-2.5 (3) MG/3ML nebulizer solution 3 mL, Nebulization, EVERY 6 HOURS PRN   • Lantus SoloStar 15 Units, Subcutaneous, EVERY EVENING   • levothyroxine (SYNTHROID) 100 mcg, Oral, EACH MORNING ON EMPTY STOMACH   • magnesium oxide (MAG-OX) 400 mg, Oral, DAILY   • Melatonin 10 mg, Oral, EVERY EVENING   • methocarbamol (ROBAXIN) 750 mg, Oral, 4 TIMES DAILY   • Mirabegron ER (MYRBETRIQ) 50 MG TABLET SR 24 HR Oral   • mycophenolate (CELLCEPT) 1,000 mg, Oral, 2 TIMES DAILY   • nitrofurantoin (MACRODANTIN) 50 MG Cap nitrofurantoin macrocrystal 50 mg capsule   Take 1 capsule as needed by oral route.   Take 1 capsule following sexual intercourse   • NON SPECIFIED Indications: D-Mannose   • nystatin (MYCOSTATIN) 759181 UNIT/GM Cream topical cream nystatin 100,000 unit/gram topical cream   APPLY TO THE AFFECTED AREA(S) BY TOPICAL ROUTE 2 TIMES PER DAY   • nystatin/triamcinolone (MYCOLOG) 145541-6.1 UNIT/GM-% Cream Apply to affected area 4 times daily.   • ondansetron (ZOFRAN ODT) 4 mg, Oral, EVERY 6 HOURS PRN   • OXcarbazepine (TRILEPTAL) 150 MG Tab TAKE ONE TABLET BY MOUTH TWICE DAILY   • potassium chloride SA (KDUR) 20 MEQ Tab CR 20 mEq, Oral, 2 TIMES DAILY   • predniSONE (DELTASONE) 20 mg, Oral,  "DAILY   • pregabalin (LYRICA) 300 mg, Oral, 2 TIMES DAILY   • prochlorperazine (COMPAZINE) 10 mg, Oral, EVERY 6 HOURS PRN   • Riboflavin 400 mg, Oral, DAILY   • Rizatriptan Benzoate (MAXALT-MLT) 5 mg, Oral, 2X A WEEK, Take 1 tablet at the onset of migraine can repeat up to 1 tab more in 24h no more than 10 tablets/mo   • sodium bicarbonate (SODIUM BICARBONATE) 650 mg, Oral, 4 TIMES DAILY   • tiotropium (SPIRIVA) 18 MCG Cap 1 Cap, Inhalation, DAILY   • topiramate (TOPAMAX) 25 mg, Oral, EVERY EVENING, Take 1 tablet before sleep nightly for 7 days then increase to 2 tablets before bedtime after that   • traZODone (DESYREL) 100 MG Tab TAKE  ONE TABLET BY MOUTH NIGHTLY AT BEDTIME   • vitamin D (VITAMIND D3) 1,000 Units, Oral, DAILY   • ziprasidone (GEODON) 40 MG Cap TAKE ONE CAPSULE BY MOUTH TWICE DAILY         ALLERGIES  Allergies   Allergen Reactions   • Depakote [Divalproex Sodium] Unspecified     Muscle spasms/muscle pain and weakness     • Amitriptyline Unspecified     Headaches     • Aripiprazole [Abilify] Unspecified     Headaches/muscle twitching     • Clindamycin Nausea     Even with food     • Flagyl [Metronidazole Hcl] Unspecified     \"eye problems\"     • Flomax [Tamsulosin Hydrochloride] Swelling   • Levaquin Unspecified     Severe muscle cramps in legs  RXN=unknown   • Metformin Unspecified     Increased lactic acid      • Tape Rash     Tears skin off  coban with Tegaderm tape ok intermittently  RXN=ongoing   • Vancomycin Itching     Pt becomes flushed in face and chest.   RXN=7/10/16   • Wound Dressing Adhesive Hives     By pt report   • Ciprofloxacin    • Hydromorphone Hcl      Pt states she feels loopy   • Keflex Rash     Pt states she gets a rash with this medication  Tolerates ceftriaxone   • Levofloxacin Unspecified     Leg muscle cramps   • Penicillins        PHYSICAL EXAM  VITAL SIGNS: /61   Pulse 69   Temp 36.4 °C (97.6 °F) (Temporal)   Resp 18   Ht 1.651 m (5' 5\")   Wt 116.6 kg (257 lb)  "  SpO2 98%   BMI 42.77 kg/m²      Constitutional: Non-toxic appearing young female, Alert in no apparent distress.  HENT: Normocephalic, Atraumatic. Bilateral external ears normal. Nose normal. Moist mucous membranes.  Neck: Supple, full range of motion.  Eyes: Pupils are equal and reactive. Conjunctiva normal.   Abdomen:  Soft, no distention. No tenderness to palpation  Skin: Warm, Dry. No rash.   Musculoskeletal: Atraumatic, no deformities noted.   Neurologic: Alert and oriented. Moving all extremities spontaneously  Psychiatric: Affect normal, Mood normal. Appears appropriate and not intoxicated.   : Suprapubic catheter in place with draining urine, minimal surrounding erythema and no drainage.     I verified that the patient was wearing a mask and I was wearing appropriate PPE every time I entered the room. The patient's mask was on the patient at all times during my encounter except for a brief view of the oropharynx.          ED COURSE  Vitals:    08/08/20 2345 08/09/20 0001 08/09/20 0048 08/09/20 0049   BP: 122/61 119/62 123/68    Pulse: 69 68  66   Resp: 18 20  16   Temp: 36.4 °C (97.6 °F)      TempSrc: Temporal      SpO2: 98% 97%  98%   Weight:       Height:             Medications administered:  Medications - No data to display      Old records personally reviewed:  Patient recently had a suprapubic catheter placement on 8/6/20. She is wheelchair bound for complex transfer myelitis.        MEDICAL DECISION MAKING  12:10 AM Patient seen and examined at bedside. The patient presents with concern for blocked suprapubic catheter.  She is nontoxic-appearing with normal vital signs on arrival.  The catheter appears to be in place and is draining yellow, clear urine.  There is no signs of surrounding infection or leaking of the catheter.  No fevers or other symptoms concerning for infectious process.  Catheter was flushed here without difficulty.  Patient will be provided reassurance and discharged home at  this time. Patient understands plan of care and strict return precautions for changing or worsening symptoms.    The patient is referred to a primary physician for blood pressure management, diabetic screening, and for all other preventative health concerns.      DISPOSITION:  Patient will be discharged home in stable condition.    FOLLOW UP:  Torres Brody M.D.  [address]  [facility] [address]  [phone]    Schedule an appointment as soon as possible for a visit       [facility], Emergency Dept  [address] [facility] [id number] [phone]    If symptoms worsen            IMPRESSION  (T83.010A) Suprapubic catheter dysfunction, initial encounter ([facility])    Results, diagnoses, and treatment options were discussed with the patient and/or family. Patient verbalized understanding of plan of care and strict return precautions prior to discharge.    Discharge Medication List as of 8/9/2020 12:51 AM               Alejandrina RODRIGUEZ (Carolibe), am scribing for, and in the presence of, Phuong Calderon M.D..    Electronically signed by: Alejandrina Roa (Carolibsadie), 8/9/2020    Phuong RODRIGUEZ M.D. personally performed the services described in this documentation, as scribed by Alejandrina Roa in my presence, and it is both accurate and complete. E.       The note accurately reflects work and decisions made by me.  Phuong Calderon M.D.  8/9/2020  5:01 AM

## 2020-08-10 RX ORDER — DULAGLUTIDE 1.5 MG/.5ML
INJECTION, SOLUTION SUBCUTANEOUS
Qty: 4 ML | Refills: 10 | Status: SHIPPED
Start: 2020-08-10 | End: 2020-08-15

## 2020-08-12 ENCOUNTER — HOSPITAL ENCOUNTER (EMERGENCY)
Facility: MEDICAL CENTER | Age: 30
End: 2020-08-12
Attending: EMERGENCY MEDICINE
Payer: MEDICARE

## 2020-08-12 VITALS
WEIGHT: 257.06 LBS | SYSTOLIC BLOOD PRESSURE: 110 MMHG | DIASTOLIC BLOOD PRESSURE: 60 MMHG | HEART RATE: 67 BPM | OXYGEN SATURATION: 98 % | HEIGHT: 65 IN | BODY MASS INDEX: 42.83 KG/M2 | RESPIRATION RATE: 18 BRPM | TEMPERATURE: 97.3 F

## 2020-08-12 DIAGNOSIS — T83.9XXA PROBLEM WITH FOLEY CATHETER, INITIAL ENCOUNTER (HCC): ICD-10-CM

## 2020-08-12 PROCEDURE — 96372 THER/PROPH/DIAG INJ SC/IM: CPT

## 2020-08-12 PROCEDURE — 87077 CULTURE AEROBIC IDENTIFY: CPT

## 2020-08-12 PROCEDURE — 700111 HCHG RX REV CODE 636 W/ 250 OVERRIDE (IP)

## 2020-08-12 PROCEDURE — 87186 SC STD MICRODIL/AGAR DIL: CPT

## 2020-08-12 PROCEDURE — 99284 EMERGENCY DEPT VISIT MOD MDM: CPT

## 2020-08-12 PROCEDURE — 87086 URINE CULTURE/COLONY COUNT: CPT

## 2020-08-12 RX ADMIN — FENTANYL CITRATE 50 MCG: 50 INJECTION, SOLUTION INTRAMUSCULAR; INTRAVENOUS at 00:50

## 2020-08-12 ASSESSMENT — ENCOUNTER SYMPTOMS
NAUSEA: 0
VOMITING: 0
ABDOMINAL PAIN: 0
FEVER: 0

## 2020-08-12 ASSESSMENT — FIBROSIS 4 INDEX: FIB4 SCORE: 0.42

## 2020-08-12 NOTE — ED NOTES
Suprapubic site appears to be healing, granulation tissue present. Urine flowing down catheter tubing.

## 2020-08-12 NOTE — ED TRIAGE NOTES
New suprapubic catheter x 1 week. Pt reports that she and family tried to flush catheter 3 times at home for lack of drainage and pain. Also reports suprapubic and R flank pain per ems.    Pt denies respiratory symptoms, fever, or known covid exposure.

## 2020-08-12 NOTE — ED NOTES
Pt given discharge paper work with no questions at this time. [facility] contacted for transportation home.

## 2020-08-12 NOTE — ED PROVIDER NOTES
ED Provider Note    Primary care provider: Torres Brody M.D.  Means of arrival: private vehicle  History obtained from: patient  History limited by: none    CHIEF COMPLAINT  Chief Complaint   Patient presents with   • Urinary Catheter Problem       HPI  Kristin Balderrama is a 29 y.o. female who presents to the Emergency Department for jara catheter problem.  Patient had a suprapubic catheter placed on 8/6/2020.  She is wheelchair bound secondary to transverse myelitis which is why she also has a suprapubic catheter.  Patient comes in today because she feels as if her catheter is not draining enough.  She reports she followed up with her urologist today who advised her that if she was having discomfort or felt it was not draining she could flush it.  Patient reports that she has flushed 3 times today and it is flushing clear fluid that is not bloody and is draining intermittently, however she feels it is not draining enough.  She has no other complaints at this time.  Reports mild discomfort in the suprapubic region which is chronic for her.  Denies fevers, chills, nausea, vomiting.      REVIEW OF SYSTEMS  Review of Systems   Constitutional: Negative for fever.   Gastrointestinal: Negative for abdominal pain, nausea and vomiting.   Genitourinary:        Positive for suprapubic catheter problem         PAST MEDICAL HISTORY   has a past medical history of Abdominal pain, Anginal syndrome, Apnea, sleep, Arrhythmia, Arthritis, ASTHMA, Atrial fibrillation (HCC), Back pain, Borderline personality disorder (HCC), Breath shortness, Bronchitis, Cardiac arrhythmia, Chickenpox, Chronic UTI (9/18/2010), Cough, Daytime sleepiness, Depression, Diabetes (HCC), Diarrhea, Disorder of thyroid, Fall, Fatigue, Frequent headaches, Gasping for breath, Gynecological disorder, Headache(784.0), Heart burn, History of falling, Hypertension, Indigestion, Migraine, 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HCC), 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HCC), Nausea,  Obesity, Other fatigue (6/29/2020), Pain (08-15-12), Painful joint, PCOS (polycystic ovarian syndrome), Pneumonia (2012), Psychosis (HCC), Renal disorder, Ringing in ears, Scoliosis, Shortness of breath, Sinus tachycardia (10/31/2013), Sleep apnea, Snoring, Tonsillitis, 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HCC), Tuberculosis, Urinary bladder disorder, Urinary incontinence, Weakness, and Wears glasses.    SURGICAL HISTORY   has a past surgical history that includes neuro dest facet l/s w/ig sngl (4/21/2015); recovery (1/27/2016); [place] by laparoscopy (8/29/2010); lumbar fusion anterior (8/21/2012); other cardiac surgery (1/2016); tonsillectomy; bowel stimulator insertion (7/13/2016); gastroscopy with balloon dilatation (N/A, 1/4/2017); muscle biopsy (Right, 1/26/2017); other abdominal surgery; and laminotomy.    SOCIAL HISTORY  Social History     Tobacco Use   • Smoking status: Never Smoker   • Smokeless tobacco: Never Used   Substance Use Topics   • Alcohol use: No     Alcohol/week: 0.0 oz   • Drug use: Not Currently     Frequency: 7.0 times per week     Types: Marijuana      Social History     Substance and Sexual Activity   Drug Use Not Currently   • Frequency: 7.0 times per week   • Types: Marijuana       FAMILY HISTORY  Family History   Problem Relation Age of Onset   • Hypertension Mother    • Sleep Apnea Mother    • Heart Disease Mother    • Other Mother         hypothryod   • Hypertension Maternal Uncle    • Heart Disease Maternal Grandmother    • Hypertension Maternal Grandmother    • No Known Problems Sister    • Other Sister         Narcolepsy;fibromyalsia;bone;nerve   • Genitourinary () Problems Sister         endometriosis       CURRENT MEDICATIONS  Home Medications     Reviewed by Ruth Ann Weber R.N. (Registered Nurse) on 08/12/20 at 0034  Med List Status: Partial   Medication Last Dose Status   acetaminophen (TYLENOL) 500 MG Tab  Active   acetaZOLAMIDE (DIAMOX) 250 MG Tab  Active   albuterol 108 (90 Base) MCG/ACT  Aero Soln inhalation aerosol  Active   aspirin EC (ECOTRIN) 81 MG Tablet Delayed Response  Active   azithromycin (ZITHROMAX) 250 MG Tab  Active   busPIRone (BUSPAR) 10 MG Tab tablet  Active   cefdinir (OMNICEF) 300 MG Cap  Active   Coenzyme Q10 300 MG Cap  Active   diphenhydrAMINE (BENADRYL) 12.5 MG/5ML Liquid liquid  Active   etonogestrel (NEXPLANON) 68 MG Implant implant  Active   fluconazole (DIFLUCAN) 100 MG Tab  Active   fluoxetine (PROZAC) 40 MG capsule  Active   furosemide (LASIX) 80 MG Tab  Active   gabapentin (NEURONTIN) 300 MG Cap  Active   insulin glargine (LANTUS SOLOSTAR) 100 UNIT/ML Solution Pen-injector injection  Active   ipratropium-albuterol (DUONEB) 0.5-2.5 (3) MG/3ML nebulizer solution  Active   Ivabradine HCl (CORLANOR) 7.5 MG Tab  Active   levothyroxine (SYNTHROID) 100 MCG Tab  Active   magnesium oxide (MAG-OX) 400 MG Tab tablet  Active   Melatonin 10 MG Tab  Active   methocarbamol (ROBAXIN) 750 MG Tab  Active   Mirabegron ER (MYRBETRIQ) 50 MG TABLET SR 24 HR  Active   mycophenolate (CELLCEPT) 500 MG tablet  Active   nitrofurantoin (MACRODANTIN) 50 MG Cap  Active   NON SPECIFIED  Active   nystatin (MYCOSTATIN) 082680 UNIT/GM Cream topical cream  Active   nystatin/triamcinolone (MYCOLOG) 065182-6.1 UNIT/GM-% Cream  Active   ondansetron (ZOFRAN ODT) 4 MG TABLET DISPERSIBLE  Active   OXcarbazepine (TRILEPTAL) 150 MG Tab  Active   potassium chloride SA (KDUR) 20 MEQ Tab CR  Active   predniSONE (DELTASONE) 20 MG Tab  Active   pregabalin (LYRICA) 300 MG capsule  Active   prochlorperazine (COMPAZINE) 10 MG Tab  Active   Riboflavin 400 MG Cap  Active   Rizatriptan Benzoate (MAXALT-MLT) 5 MG TABLET DISPERSIBLE  Active   sodium bicarbonate (SODIUM BICARBONATE) 650 MG Tab  Active   tiotropium (SPIRIVA) 18 MCG Cap  Active   topiramate (TOPAMAX) 25 MG Tab  Active   traZODone (DESYREL) 100 MG Tab  Active   TRULICITY 1.5 MG/0.5ML Solution Pen-injector  Active   vitamin D (VITAMIND D3) 1000 UNIT Tab  Active  "  ziprasidone (GEODON) 40 MG Cap  Active                ALLERGIES  Allergies   Allergen Reactions   • Depakote [Divalproex Sodium] Unspecified     Muscle spasms/muscle pain and weakness     • Amitriptyline Unspecified     Headaches     • Aripiprazole [Abilify] Unspecified     Headaches/muscle twitching     • Clindamycin Nausea     Even with food     • Flagyl [Metronidazole Hcl] Unspecified     \"eye problems\"     • Flomax [Tamsulosin Hydrochloride] Swelling   • Levaquin Unspecified     Severe muscle cramps in legs  RXN=unknown   • Metformin Unspecified     Increased lactic acid      • Tape Rash     Tears skin off  coban with Tegaderm tape ok intermittently  RXN=ongoing   • Vancomycin Itching     Pt becomes flushed in face and chest.   RXN=7/10/16   • Wound Dressing Adhesive Hives     By pt report   • Ciprofloxacin    • Hydromorphone Hcl      Pt states she feels loopy   • Keflex Rash     Pt states she gets a rash with this medication  Tolerates ceftriaxone   • Levofloxacin Unspecified     Leg muscle cramps   • Metronidazole Rash     .   • Penicillins    • Tamsulosin    • Valproic Acid Rash     .       PHYSICAL EXAM  VITAL SIGNS: /63   Pulse 68   Temp 35.9 °C (96.7 °F) (Temporal)   Resp 18   Ht 1.651 m (5' 5\")   Wt 116.6 kg (257 lb 0.9 oz)   SpO2 98%   BMI 42.78 kg/m²   Vitals reviewed by myself.  Physical Exam   Constitutional: She is well-developed, well-nourished, and in no distress. No distress.   HENT:   Head: Normocephalic.   Eyes: EOM are normal.   Neck: Normal range of motion.   Cardiovascular: Normal rate and regular rhythm. Exam reveals no gallop and no friction rub.   No murmur heard.  Pulmonary/Chest: Effort normal and breath sounds normal. No respiratory distress. She has no wheezes. She has no rales.   Abdominal:   Suprapubic catheter site is clean and dry urine around the site.  No surrounding erythema from suprapubic catheter site.  Andrade bag is noted to have clear urine in it and it was " just drained prior to arrival.   Musculoskeletal: Normal range of motion.   Skin: Skin is warm and dry.         DIAGNOSTIC STUDIES /  LABS  Labs Reviewed   URINE CULTURE(NEW)    Narrative:     Indication for culture:->Patient WITHOUT an indwelling Andrade  catheter in place with new onset of Dysuria, Frequency,  Urgency, and/or Suprapubic pain       All labs reviewed by me.      COURSE & MEDICAL DECISION MAKING  Nursing notes, VS, PMSFHx reviewed in chart.    Patient is a 29-year-old female who comes in for evaluation of Andrade catheter problem.  On physical exam she is well-appearing with vitals normal limits.  Exam reveals no evidence of infection surrounding the suprapubic catheter site.  I advised patient that her catheter is draining adequately here in the ER and there is no evidence of blood clots that would be causing issues.  She is likely experiencing bladder spasms with her constant suprapubic pain and urge to urinate.  I advised her she needs to discuss this with urologist as she may need to be on a chronic medication for this.  I advised her we can flush the Andrade catheter at this time, however it was just flushed by her and her mother prior to arrival and it appears to be draining clear urine.  Patient declines flushing of the Andrade catheter at this time.  I advised her on the need for continued follow-up with urology and that we would not touch exchange a fresh suprapubic catheter site at this time given risk of creating false tract.  She is nontoxic-appearing and afebrile making urinary tract infection unlikely.  As urinalysis will likely be contaminated with cells given her chronic indwelling catheter I will send urine for culture but not start any antibiotics at this time.  Patient understands and is agreeable to this plan.  Her discomfort is treated with morphine and she is discharged back home.      FINAL IMPRESSION  1. Problem with Andrade catheter, initial encounter (HCC)

## 2020-08-12 NOTE — FACE TO FACE
Face to Face Supporting Documentation - Home Health    The encounter with this patient was in whole or in part the primary reason for home health admission.    Date of encounter:   Patient:                    MRN:                       YOB: 2018  Kristin Balderrama  [id number]  1990     Home health to see patient for:  Skilled Nursing care for assessment, interventions & education, Physical Therapy evaluation and treatment, Occupational therapy evaluation and treatment, Registered dietitian consult, Medical social work consult and Home health aide    Skilled need for:  Comment: Debility / CNS or muscle disease with frequent exacerbations     Skilled nursing interventions to include:  Comment: On going PT/OT and supra pubic catheter care    Homebound status evidenced by:  Need the aid of supportive devices such as crutches, canes, wheelchairs or walkers, Require the use of special transportation or Needs the assistance of another person in order to leave the home. Leaving home requires a considerable and taxing effort. There is a normal inability to leave the home.    Community Physician to provide follow up care: Torres Brody M.D.     Optional Interventions? No      I certify the face to face encounter for this home health care referral meets the CMS requirements and the encounter/clinical assessment with the patient was, in whole, or in part, for the medical condition(s) listed above, which is the primary reason for home health care. Based on my clinical findings: the service(s) are medically necessary, support the need for home health care, and the homebound criteria are met.  I certify that this patient has had a face to face encounter by myself.  Earl Meehan M.D. - NPI: [id number]     Tumor Debulked?: curette

## 2020-08-15 ENCOUNTER — HOSPITAL ENCOUNTER (INPATIENT)
Facility: MEDICAL CENTER | Age: 30
LOS: 30 days | DRG: 123 | End: 2020-09-14
Attending: EMERGENCY MEDICINE | Admitting: INTERNAL MEDICINE
Payer: MEDICARE

## 2020-08-15 ENCOUNTER — APPOINTMENT (OUTPATIENT)
Dept: RADIOLOGY | Facility: MEDICAL CENTER | Age: 30
DRG: 123 | End: 2020-08-15
Attending: EMERGENCY MEDICINE
Payer: MEDICARE

## 2020-08-15 DIAGNOSIS — F25.1 SCHIZOAFFECTIVE DISORDER, DEPRESSIVE TYPE (HCC): ICD-10-CM

## 2020-08-15 DIAGNOSIS — G63 POLYNEUROPATHY ASSOCIATED WITH UNDERLYING DISEASE (HCC): Chronic | ICD-10-CM

## 2020-08-15 DIAGNOSIS — Z93.59 PRESENCE OF SUPRAPUBIC CATHETER (HCC): ICD-10-CM

## 2020-08-15 DIAGNOSIS — F60.3 BORDERLINE PERSONALITY DISORDER IN ADULT (HCC): ICD-10-CM

## 2020-08-15 DIAGNOSIS — G93.2 IDIOPATHIC INTRACRANIAL HYPERTENSION: ICD-10-CM

## 2020-08-15 DIAGNOSIS — R07.9 CHEST PAIN, UNSPECIFIED TYPE: ICD-10-CM

## 2020-08-15 PROBLEM — H53.8 BLURRED VISION: Status: ACTIVE | Noted: 2020-08-15

## 2020-08-15 PROBLEM — G37.3 TRANSVERSE MYELITIS (HCC): Status: ACTIVE | Noted: 2020-08-15

## 2020-08-15 LAB
ANION GAP SERPL CALC-SCNC: 16 MMOL/L (ref 7–16)
BASOPHILS # BLD AUTO: 0.8 % (ref 0–1.8)
BASOPHILS # BLD: 0.06 K/UL (ref 0–0.12)
BUN SERPL-MCNC: 19 MG/DL (ref 8–22)
CALCIUM SERPL-MCNC: 9.2 MG/DL (ref 8.5–10.5)
CHLORIDE SERPL-SCNC: 108 MMOL/L (ref 96–112)
CO2 SERPL-SCNC: 14 MMOL/L (ref 20–33)
CREAT SERPL-MCNC: 0.74 MG/DL (ref 0.5–1.4)
CRP SERPL HS-MCNC: 0.44 MG/DL (ref 0–0.75)
EOSINOPHIL # BLD AUTO: 0.18 K/UL (ref 0–0.51)
EOSINOPHIL NFR BLD: 2.5 % (ref 0–6.9)
ERYTHROCYTE [DISTWIDTH] IN BLOOD BY AUTOMATED COUNT: 43 FL (ref 35.9–50)
ERYTHROCYTE [SEDIMENTATION RATE] IN BLOOD BY WESTERGREN METHOD: 10 MM/HOUR (ref 0–20)
EST. AVERAGE GLUCOSE BLD GHB EST-MCNC: 97 MG/DL
GLUCOSE BLD-MCNC: 102 MG/DL (ref 65–99)
GLUCOSE BLD-MCNC: 116 MG/DL (ref 65–99)
GLUCOSE SERPL-MCNC: 91 MG/DL (ref 65–99)
HBA1C MFR BLD: 5 % (ref 0–5.6)
HCT VFR BLD AUTO: 41.4 % (ref 37–47)
HGB BLD-MCNC: 13.7 G/DL (ref 12–16)
IMM GRANULOCYTES # BLD AUTO: 0.05 K/UL (ref 0–0.11)
IMM GRANULOCYTES NFR BLD AUTO: 0.7 % (ref 0–0.9)
LYMPHOCYTES # BLD AUTO: 2.37 K/UL (ref 1–4.8)
LYMPHOCYTES NFR BLD: 33.3 % (ref 22–41)
MAGNESIUM SERPL-MCNC: 2.1 MG/DL (ref 1.5–2.5)
MCH RBC QN AUTO: 29.5 PG (ref 27–33)
MCHC RBC AUTO-ENTMCNC: 33.1 G/DL (ref 33.6–35)
MCV RBC AUTO: 89 FL (ref 81.4–97.8)
MONOCYTES # BLD AUTO: 0.72 K/UL (ref 0–0.85)
MONOCYTES NFR BLD AUTO: 10.1 % (ref 0–13.4)
NEUTROPHILS # BLD AUTO: 3.73 K/UL (ref 2–7.15)
NEUTROPHILS NFR BLD: 52.6 % (ref 44–72)
NRBC # BLD AUTO: 0 K/UL
NRBC BLD-RTO: 0 /100 WBC
PLATELET # BLD AUTO: 194 K/UL (ref 164–446)
PMV BLD AUTO: 11.3 FL (ref 9–12.9)
POTASSIUM SERPL-SCNC: 3.3 MMOL/L (ref 3.6–5.5)
RBC # BLD AUTO: 4.65 M/UL (ref 4.2–5.4)
SODIUM SERPL-SCNC: 138 MMOL/L (ref 135–145)
WBC # BLD AUTO: 7.1 K/UL (ref 4.8–10.8)

## 2020-08-15 PROCEDURE — 700102 HCHG RX REV CODE 250 W/ 637 OVERRIDE(OP): Performed by: HOSPITALIST

## 2020-08-15 PROCEDURE — 85025 COMPLETE CBC W/AUTO DIFF WBC: CPT

## 2020-08-15 PROCEDURE — 700101 HCHG RX REV CODE 250: Performed by: INTERNAL MEDICINE

## 2020-08-15 PROCEDURE — 700111 HCHG RX REV CODE 636 W/ 250 OVERRIDE (IP): Performed by: HOSPITALIST

## 2020-08-15 PROCEDURE — 83735 ASSAY OF MAGNESIUM: CPT

## 2020-08-15 PROCEDURE — 96374 THER/PROPH/DIAG INJ IV PUSH: CPT

## 2020-08-15 PROCEDURE — 86140 C-REACTIVE PROTEIN: CPT

## 2020-08-15 PROCEDURE — 700101 HCHG RX REV CODE 250: Performed by: EMERGENCY MEDICINE

## 2020-08-15 PROCEDURE — 700112 HCHG RX REV CODE 229: Performed by: INTERNAL MEDICINE

## 2020-08-15 PROCEDURE — 70450 CT HEAD/BRAIN W/O DYE: CPT | Mod: ME

## 2020-08-15 PROCEDURE — 700102 HCHG RX REV CODE 250 W/ 637 OVERRIDE(OP): Performed by: INTERNAL MEDICINE

## 2020-08-15 PROCEDURE — 770001 HCHG ROOM/CARE - MED/SURG/GYN PRIV*

## 2020-08-15 PROCEDURE — 700102 HCHG RX REV CODE 250 W/ 637 OVERRIDE(OP): Performed by: EMERGENCY MEDICINE

## 2020-08-15 PROCEDURE — 80048 BASIC METABOLIC PNL TOTAL CA: CPT

## 2020-08-15 PROCEDURE — A9270 NON-COVERED ITEM OR SERVICE: HCPCS | Performed by: INTERNAL MEDICINE

## 2020-08-15 PROCEDURE — 83036 HEMOGLOBIN GLYCOSYLATED A1C: CPT

## 2020-08-15 PROCEDURE — 93880 EXTRACRANIAL BILAT STUDY: CPT

## 2020-08-15 PROCEDURE — 99223 1ST HOSP IP/OBS HIGH 75: CPT | Mod: AI | Performed by: INTERNAL MEDICINE

## 2020-08-15 PROCEDURE — 99285 EMERGENCY DEPT VISIT HI MDM: CPT

## 2020-08-15 PROCEDURE — 700111 HCHG RX REV CODE 636 W/ 250 OVERRIDE (IP): Performed by: INTERNAL MEDICINE

## 2020-08-15 PROCEDURE — 85652 RBC SED RATE AUTOMATED: CPT

## 2020-08-15 PROCEDURE — C9803 HOPD COVID-19 SPEC COLLECT: HCPCS | Performed by: INTERNAL MEDICINE

## 2020-08-15 PROCEDURE — 82962 GLUCOSE BLOOD TEST: CPT

## 2020-08-15 PROCEDURE — 96372 THER/PROPH/DIAG INJ SC/IM: CPT

## 2020-08-15 PROCEDURE — A9270 NON-COVERED ITEM OR SERVICE: HCPCS | Performed by: EMERGENCY MEDICINE

## 2020-08-15 PROCEDURE — A9270 NON-COVERED ITEM OR SERVICE: HCPCS | Performed by: HOSPITALIST

## 2020-08-15 RX ORDER — ONDANSETRON 4 MG/1
4 TABLET, ORALLY DISINTEGRATING ORAL EVERY 6 HOURS PRN
COMMUNITY
End: 2020-12-21 | Stop reason: SDUPTHER

## 2020-08-15 RX ORDER — IPRATROPIUM BROMIDE AND ALBUTEROL SULFATE 2.5; .5 MG/3ML; MG/3ML
3 SOLUTION RESPIRATORY (INHALATION) EVERY 6 HOURS PRN
Status: DISCONTINUED | OUTPATIENT
Start: 2020-08-15 | End: 2020-08-17

## 2020-08-15 RX ORDER — TOPIRAMATE 25 MG/1
50 TABLET ORAL 2 TIMES DAILY
Status: ON HOLD | COMMUNITY
End: 2020-09-14

## 2020-08-15 RX ORDER — BUSPIRONE HYDROCHLORIDE 10 MG/1
10 TABLET ORAL 2 TIMES DAILY
COMMUNITY
End: 2020-12-16

## 2020-08-15 RX ORDER — PREGABALIN 100 MG/1
300 CAPSULE ORAL 2 TIMES DAILY
Status: DISCONTINUED | OUTPATIENT
Start: 2020-08-15 | End: 2020-08-25

## 2020-08-15 RX ORDER — ZIPRASIDONE HYDROCHLORIDE 40 MG/1
40 CAPSULE ORAL DAILY
Status: DISCONTINUED | OUTPATIENT
Start: 2020-08-15 | End: 2020-08-16

## 2020-08-15 RX ORDER — POTASSIUM CHLORIDE 20 MEQ/1
40 TABLET, EXTENDED RELEASE ORAL ONCE
Status: COMPLETED | OUTPATIENT
Start: 2020-08-15 | End: 2020-08-15

## 2020-08-15 RX ORDER — SODIUM BICARBONATE 650 MG/1
650 TABLET ORAL 4 TIMES DAILY
Status: DISCONTINUED | OUTPATIENT
Start: 2020-08-15 | End: 2020-09-14 | Stop reason: HOSPADM

## 2020-08-15 RX ORDER — OXCARBAZEPINE 150 MG/1
150 TABLET, FILM COATED ORAL 2 TIMES DAILY
COMMUNITY
End: 2020-10-11

## 2020-08-15 RX ORDER — ACETAZOLAMIDE 500 MG/1
1500 CAPSULE, EXTENDED RELEASE ORAL EVERY MORNING
Status: DISCONTINUED | OUTPATIENT
Start: 2020-08-16 | End: 2020-09-14 | Stop reason: HOSPADM

## 2020-08-15 RX ORDER — LEVOTHYROXINE SODIUM 0.1 MG/1
100 TABLET ORAL
Status: ON HOLD | COMMUNITY
End: 2020-09-14

## 2020-08-15 RX ORDER — BISACODYL 10 MG
10 SUPPOSITORY, RECTAL RECTAL
Status: DISCONTINUED | OUTPATIENT
Start: 2020-08-15 | End: 2020-09-14 | Stop reason: HOSPADM

## 2020-08-15 RX ORDER — CLONIDINE HYDROCHLORIDE 0.1 MG/1
0.1 TABLET ORAL EVERY 6 HOURS PRN
Status: DISCONTINUED | OUTPATIENT
Start: 2020-08-15 | End: 2020-09-14 | Stop reason: HOSPADM

## 2020-08-15 RX ORDER — IVABRADINE 7.5 MG/1
7.5 TABLET, FILM COATED ORAL 2 TIMES DAILY WITH MEALS
COMMUNITY
End: 2021-03-19 | Stop reason: SDUPTHER

## 2020-08-15 RX ORDER — TRAZODONE HYDROCHLORIDE 100 MG/1
100 TABLET ORAL NIGHTLY
Status: ON HOLD | COMMUNITY
End: 2020-09-14

## 2020-08-15 RX ORDER — ZIPRASIDONE HYDROCHLORIDE 40 MG/1
40 CAPSULE ORAL 2 TIMES DAILY
Status: ON HOLD | COMMUNITY
End: 2020-09-14

## 2020-08-15 RX ORDER — KETOROLAC TROMETHAMINE 30 MG/ML
15 INJECTION, SOLUTION INTRAMUSCULAR; INTRAVENOUS EVERY 6 HOURS PRN
Status: DISPENSED | OUTPATIENT
Start: 2020-08-15 | End: 2020-08-20

## 2020-08-15 RX ORDER — PREDNISONE 10 MG/1
10 TABLET ORAL DAILY
COMMUNITY
End: 2020-12-16

## 2020-08-15 RX ORDER — FLUOXETINE HYDROCHLORIDE 20 MG/1
40 CAPSULE ORAL DAILY
Status: DISCONTINUED | OUTPATIENT
Start: 2020-08-15 | End: 2020-09-14 | Stop reason: HOSPADM

## 2020-08-15 RX ORDER — ACETAZOLAMIDE 500 MG/1
500 CAPSULE, EXTENDED RELEASE ORAL 2 TIMES DAILY
COMMUNITY
End: 2021-02-26

## 2020-08-15 RX ORDER — RIBOFLAVIN (VITAMIN B2) 400 MG
400 TABLET ORAL DAILY
COMMUNITY
End: 2020-10-08

## 2020-08-15 RX ORDER — MAGNESIUM OXIDE 400 MG/1
400 TABLET ORAL DAILY
COMMUNITY
End: 2020-10-08

## 2020-08-15 RX ORDER — PREDNISONE 10 MG/1
10 TABLET ORAL DAILY
Status: DISCONTINUED | OUTPATIENT
Start: 2020-08-15 | End: 2020-08-25

## 2020-08-15 RX ORDER — DULAGLUTIDE 1.5 MG/.5ML
0.5 INJECTION, SOLUTION SUBCUTANEOUS
COMMUNITY
End: 2020-12-16

## 2020-08-15 RX ORDER — DOCUSATE SODIUM 100 MG/1
100 CAPSULE, LIQUID FILLED ORAL 2 TIMES DAILY
Status: DISCONTINUED | OUTPATIENT
Start: 2020-08-15 | End: 2020-09-14 | Stop reason: HOSPADM

## 2020-08-15 RX ORDER — INSULIN GLARGINE 100 [IU]/ML
12 INJECTION, SOLUTION SUBCUTANEOUS EVERY EVENING
Status: DISCONTINUED | OUTPATIENT
Start: 2020-08-15 | End: 2020-08-24

## 2020-08-15 RX ORDER — GABAPENTIN 300 MG/1
300 CAPSULE ORAL
Status: DISCONTINUED | OUTPATIENT
Start: 2020-08-15 | End: 2020-08-16

## 2020-08-15 RX ORDER — ACETAZOLAMIDE 500 MG/1
1000 CAPSULE, EXTENDED RELEASE ORAL EVERY EVENING
Status: DISCONTINUED | OUTPATIENT
Start: 2020-08-15 | End: 2020-09-14 | Stop reason: HOSPADM

## 2020-08-15 RX ORDER — AMOXICILLIN 250 MG
2 CAPSULE ORAL 2 TIMES DAILY
Status: DISCONTINUED | OUTPATIENT
Start: 2020-08-15 | End: 2020-09-14 | Stop reason: HOSPADM

## 2020-08-15 RX ORDER — POLYETHYLENE GLYCOL 3350 17 G/17G
1 POWDER, FOR SOLUTION ORAL
Status: DISCONTINUED | OUTPATIENT
Start: 2020-08-15 | End: 2020-09-14 | Stop reason: HOSPADM

## 2020-08-15 RX ORDER — LEVOTHYROXINE SODIUM 0.07 MG/1
75 TABLET ORAL
Status: DISCONTINUED | OUTPATIENT
Start: 2020-08-15 | End: 2020-09-14 | Stop reason: HOSPADM

## 2020-08-15 RX ORDER — DEXTROSE MONOHYDRATE 25 G/50ML
50 INJECTION, SOLUTION INTRAVENOUS
Status: DISCONTINUED | OUTPATIENT
Start: 2020-08-15 | End: 2020-09-14 | Stop reason: HOSPADM

## 2020-08-15 RX ORDER — VITAMIN B COMPLEX
1000 TABLET ORAL DAILY
COMMUNITY
End: 2020-09-21

## 2020-08-15 RX ORDER — TETRACAINE HYDROCHLORIDE 5 MG/ML
1 SOLUTION OPHTHALMIC ONCE
Status: COMPLETED | OUTPATIENT
Start: 2020-08-15 | End: 2020-08-15

## 2020-08-15 RX ORDER — FLUOXETINE HYDROCHLORIDE 40 MG/1
40 CAPSULE ORAL DAILY
COMMUNITY
End: 2020-09-21

## 2020-08-15 RX ORDER — TOPIRAMATE 100 MG/1
50 TABLET, FILM COATED ORAL EVERY EVENING
Status: DISCONTINUED | OUTPATIENT
Start: 2020-08-15 | End: 2020-08-16

## 2020-08-15 RX ORDER — INSULIN GLARGINE 100 [IU]/ML
12 INJECTION, SOLUTION SUBCUTANEOUS EVERY EVENING
Status: ON HOLD | COMMUNITY
End: 2020-09-14

## 2020-08-15 RX ORDER — TRAZODONE HYDROCHLORIDE 100 MG/1
100 TABLET ORAL
Status: DISCONTINUED | OUTPATIENT
Start: 2020-08-15 | End: 2020-09-14 | Stop reason: HOSPADM

## 2020-08-15 RX ORDER — ACETAMINOPHEN 500 MG
500 TABLET ORAL 4 TIMES DAILY PRN
Status: DISCONTINUED | OUTPATIENT
Start: 2020-08-15 | End: 2020-09-14 | Stop reason: HOSPADM

## 2020-08-15 RX ORDER — MYCOPHENOLATE MOFETIL 250 MG/1
1000 CAPSULE ORAL 2 TIMES DAILY
Status: DISCONTINUED | OUTPATIENT
Start: 2020-08-15 | End: 2020-09-14 | Stop reason: HOSPADM

## 2020-08-15 RX ADMIN — FLUORESCEIN SODIUM 1 MG: 1 STRIP OPHTHALMIC at 03:00

## 2020-08-15 RX ADMIN — MYCOPHENOLATE MOFETIL 1000 MG: 250 CAPSULE ORAL at 20:44

## 2020-08-15 RX ADMIN — TOPIRAMATE 50 MG: 100 TABLET, FILM COATED ORAL at 17:54

## 2020-08-15 RX ADMIN — SODIUM BICARBONATE 650 MG: 650 TABLET ORAL at 20:43

## 2020-08-15 RX ADMIN — KETOROLAC TROMETHAMINE 15 MG: 30 INJECTION, SOLUTION INTRAMUSCULAR at 08:55

## 2020-08-15 RX ADMIN — ASPIRIN 81 MG: 81 TABLET, COATED ORAL at 08:54

## 2020-08-15 RX ADMIN — SODIUM BICARBONATE 650 MG: 650 TABLET ORAL at 14:21

## 2020-08-15 RX ADMIN — GABAPENTIN 300 MG: 300 CAPSULE ORAL at 20:43

## 2020-08-15 RX ADMIN — DOCUSATE SODIUM 100 MG: 100 CAPSULE, LIQUID FILLED ORAL at 17:54

## 2020-08-15 RX ADMIN — POTASSIUM CHLORIDE 40 MEQ: 1500 TABLET, EXTENDED RELEASE ORAL at 11:38

## 2020-08-15 RX ADMIN — PREDNISONE 10 MG: 10 TABLET ORAL at 08:54

## 2020-08-15 RX ADMIN — DOCUSATE SODIUM 50 MG AND SENNOSIDES 8.6 MG 2 TABLET: 8.6; 5 TABLET, FILM COATED ORAL at 17:54

## 2020-08-15 RX ADMIN — DOCUSATE SODIUM 100 MG: 100 CAPSULE, LIQUID FILLED ORAL at 11:37

## 2020-08-15 RX ADMIN — INSULIN GLARGINE 12 UNITS: 100 INJECTION, SOLUTION SUBCUTANEOUS at 17:56

## 2020-08-15 RX ADMIN — PREGABALIN 300 MG: 150 CAPSULE ORAL at 09:34

## 2020-08-15 RX ADMIN — PREGABALIN 300 MG: 150 CAPSULE ORAL at 17:54

## 2020-08-15 RX ADMIN — ACETAZOLAMIDE 1000 MG: 500 CAPSULE, EXTENDED RELEASE ORAL at 20:45

## 2020-08-15 RX ADMIN — ENOXAPARIN SODIUM 40 MG: 40 INJECTION SUBCUTANEOUS at 08:54

## 2020-08-15 RX ADMIN — FLUOXETINE 40 MG: 20 CAPSULE ORAL at 09:35

## 2020-08-15 RX ADMIN — LEVOTHYROXINE SODIUM 75 MCG: 0.07 TABLET ORAL at 13:37

## 2020-08-15 RX ADMIN — SODIUM BICARBONATE 650 MG: 650 TABLET ORAL at 09:37

## 2020-08-15 RX ADMIN — ZIPRASIDONE HCL 40 MG: 40 CAPSULE ORAL at 09:34

## 2020-08-15 RX ADMIN — ACETAMINOPHEN 500 MG: 500 TABLET ORAL at 14:21

## 2020-08-15 RX ADMIN — TRAZODONE HYDROCHLORIDE 100 MG: 100 TABLET ORAL at 20:43

## 2020-08-15 RX ADMIN — TETRACAINE HYDROCHLORIDE 1 DROP: 5 SOLUTION OPHTHALMIC at 03:00

## 2020-08-15 ASSESSMENT — COPD QUESTIONNAIRES
DURING THE PAST 4 WEEKS HOW MUCH DID YOU FEEL SHORT OF BREATH: NONE/LITTLE OF THE TIME
DO YOU EVER COUGH UP ANY MUCUS OR PHLEGM?: NO/ONLY WITH OCCASIONAL COLDS OR INFECTIONS
HAVE YOU SMOKED AT LEAST 100 CIGARETTES IN YOUR ENTIRE LIFE: NO/DON'T KNOW
IN THE PAST 12 MONTHS DO YOU DO LESS THAN YOU USED TO BECAUSE OF YOUR BREATHING PROBLEMS: DISAGREE/UNSURE
COPD SCREENING SCORE: 0

## 2020-08-15 ASSESSMENT — COGNITIVE AND FUNCTIONAL STATUS - GENERAL
MOBILITY SCORE: 11
CLIMB 3 TO 5 STEPS WITH RAILING: TOTAL
EATING MEALS: A LITTLE
MOVING TO AND FROM BED TO CHAIR: A LITTLE
TOILETING: TOTAL
DAILY ACTIVITIY SCORE: 13
DRESSING REGULAR UPPER BODY CLOTHING: A LOT
MOVING FROM LYING ON BACK TO SITTING ON SIDE OF FLAT BED: A LOT
SUGGESTED CMS G CODE MODIFIER MOBILITY: CL
PERSONAL GROOMING: A LITTLE
STANDING UP FROM CHAIR USING ARMS: TOTAL
TURNING FROM BACK TO SIDE WHILE IN FLAT BAD: A LITTLE
WALKING IN HOSPITAL ROOM: TOTAL
SUGGESTED CMS G CODE MODIFIER DAILY ACTIVITY: CL
HELP NEEDED FOR BATHING: A LOT
DRESSING REGULAR LOWER BODY CLOTHING: A LOT

## 2020-08-15 ASSESSMENT — ENCOUNTER SYMPTOMS
BLURRED VISION: 1
SHORTNESS OF BREATH: 0
FEVER: 0
EYE PAIN: 1

## 2020-08-15 ASSESSMENT — LIFESTYLE VARIABLES
HAVE YOU EVER FELT YOU SHOULD CUT DOWN ON YOUR DRINKING: NO
TOTAL SCORE: 0
TOTAL SCORE: 0
CONSUMPTION TOTAL: NEGATIVE
EVER HAD A DRINK FIRST THING IN THE MORNING TO STEADY YOUR NERVES TO GET RID OF A HANGOVER: NO
AVERAGE NUMBER OF DAYS PER WEEK YOU HAVE A DRINK CONTAINING ALCOHOL: 0
HOW MANY TIMES IN THE PAST YEAR HAVE YOU HAD 5 OR MORE DRINKS IN A DAY: 0
DOES PATIENT WANT TO STOP DRINKING: NO
HAVE PEOPLE ANNOYED YOU BY CRITICIZING YOUR DRINKING: NO
TOTAL SCORE: 0
ALCOHOL_USE: NO
EVER_SMOKED: NEVER
EVER FELT BAD OR GUILTY ABOUT YOUR DRINKING: NO
ON A TYPICAL DAY WHEN YOU DRINK ALCOHOL HOW MANY DRINKS DO YOU HAVE: 0

## 2020-08-15 ASSESSMENT — FIBROSIS 4 INDEX
FIB4 SCORE: 0.32
FIB4 SCORE: 0.42

## 2020-08-15 NOTE — H&P
"Hospital Medicine History & Physical Note    Date of Service  8/15/2020    Primary Care Physician  Torres Brody M.D.    Consultants  Ophthalmology    Code Status  Full Code    Chief Complaint  Chief Complaint   Patient presents with   • Eye Pain     \"I have worms in my eye\"       History of Presenting Illness  29 y.o. female who presented 8/15/2020 with complaint of blurred vision over the last 2 days associated with headache.  She reports having worms in her eyes.  Prompting evaluation in the emergency department.  On exam she does not have said invertebrates present, CT head is unremarkable, chemistry mild hypokalemia.  Emergency room provider consults neuro-ophthalmology recommending carotid Doppler for evaluation of possible optic neuritis or retinopathy ophthalmology.  she is referred to the hospitalist for admission.    Of note the patient states she is unable to return to an independent setting as the grandmother with whom she lives is unable to continue to care for her.    Review of Systems  Review of Systems   Unable to perform ROS: Psychiatric disorder   Constitutional:        Patient felt unreliable as she answers affirmatively to all questions       Past Medical History   has a past medical history of Abdominal pain, Anginal syndrome, Apnea, sleep, Arrhythmia, Arthritis, ASTHMA, Atrial fibrillation ([facility]), Back pain, Borderline personality disorder ([facility]), Breath shortness, Bronchitis, Cardiac arrhythmia, Chickenpox, Chronic UTI (9/18/2010), Cough, Daytime sleepiness, Depression, Diabetes (HCC), Diarrhea, Disorder of thyroid, Fall, Fatigue, Frequent headaches, Gasping for breath, Gynecological disorder, Headache(784.0), Heart burn, History of falling, Hypertension, Indigestion, Migraine, 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[facility]), 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    Surgical History   has a past surgical history that includes neuro dest facet l/s w/ig sngl (4/21/2015); recovery (1/27/2016); delmar by laparoscopy (8/29/2010); lumbar fusion anterior (8/21/2012); other cardiac surgery (1/2016); tonsillectomy; bowel stimulator insertion (7/13/2016); gastroscopy with balloon dilatation (N/A, 1/4/2017); muscle biopsy (Right, 1/26/2017); other abdominal surgery; and laminotomy.     Family History  family history includes Genitourinary () Problems in her sister; Heart Disease in her maternal grandmother and mother; Hypertension in her maternal grandmother, maternal uncle, and mother; No Known Problems in her sister; Other in her mother and sister; Sleep Apnea in her mother.     Social History   reports that she has never smoked. She has never used smokeless tobacco. She reports previous drug use. Frequency: 7.00 times per week. Drug: Marijuana. She reports that she does not drink alcohol.    Allergies  Allergies   Allergen Reactions   • Depakote [Divalproex Sodium] Unspecified     Muscle spasms/muscle pain and weakness     • Amitriptyline Unspecified     Headaches     • Aripiprazole [Abilify] Unspecified     Headaches/muscle twitching     • Clindamycin Nausea     Even with food     • Flagyl [Metronidazole Hcl] Unspecified     \"eye problems\"     • Flomax [Tamsulosin Hydrochloride] Swelling   • Levaquin Unspecified     Severe muscle cramps in legs  RXN=unknown   • Metformin Unspecified     Increased lactic acid      • Tape Rash     Tears skin off  coban with Tegaderm tape ok intermittently  RXN=ongoing   • Vancomycin Itching     Pt becomes flushed in face and chest.   RXN=7/10/16   • Wound Dressing Adhesive Hives     By pt report   • " Ciprofloxacin    • Hydromorphone Hcl      Pt states she feels loopy   • Keflex Rash     Pt states she gets a rash with this medication  Tolerates ceftriaxone   • Levofloxacin Unspecified     Leg muscle cramps   • Metronidazole Rash     .   • Penicillins    • Tamsulosin    • Valproic Acid Rash     .       Medications  Prior to Admission Medications   Prescriptions Last Dose Informant Patient Reported? Taking?   Coenzyme Q10 300 MG Cap  Patient No No   Sig: Take 1 Cap by mouth every day.   Ivabradine HCl (CORLANOR) 7.5 MG Tab  Patient No No   Sig: Take 7.5 mg by mouth 2 Times a Day. Needs to schedule follow up with Dr. Wilde   Melatonin 10 MG Tab  Patient Yes No   Sig: Take 10 mg by mouth every evening.   Mirabegron ER (MYRBETRIQ) 50 MG TABLET SR 24 HR  Patient Yes No   Sig: Take  by mouth.   NON SPECIFIED  Patient Yes No   Sig: Indications: D-Mannose   OXcarbazepine (TRILEPTAL) 150 MG Tab  Patient No No   Sig: TAKE ONE TABLET BY MOUTH TWICE DAILY   Riboflavin 400 MG Cap  Patient No No   Sig: Take 1 Cap by mouth every day.   Rizatriptan Benzoate (MAXALT-MLT) 5 MG TABLET DISPERSIBLE  Patient No No   Sig: Take 1 Tab by mouth two times a week. Take 1 tablet at the onset of migraine can repeat up to 1 tab more in 24h no more than 10 tablets/mo   TRULICITY 1.5 MG/0.5ML Solution Pen-injector   No No   Sig: INJECT CONTENTS OF ONE SYRINGE WEEKLY   acetaZOLAMIDE (DIAMOX) 250 MG Tab  Patient Yes No   Sig: Take 1,000-1,500 mg by mouth 2 times a day. 1500 mg in the AM  1000 mg in the PM   acetaminophen (TYLENOL) 500 MG Tab  Patient No No   Sig: Take 1 Tab by mouth 4 times a day as needed for Mild Pain, Moderate Pain or Fever (Mild Pain; (Pain scale 1-3); Temp greater than 100.5 F).   albuterol 108 (90 Base) MCG/ACT Aero Soln inhalation aerosol  Patient Yes No   Sig: Inhale 2 Puffs by mouth every 6 hours as needed for Shortness of Breath.   aspirin EC (ECOTRIN) 81 MG Tablet Delayed Response  Patient Yes No   Sig: Take 81 mg  by mouth every day.   azithromycin (ZITHROMAX) 250 MG Tab  Patient No No   Sig: Take 500 mg (2 Tabs) by mouth on day one; then take 250 mg (1 Tab) by mouth once daily for 4 days.   busPIRone (BUSPAR) 10 MG Tab tablet  Patient No No   Sig: TAKE ONE TABLET BY MOUTH TWICE DAILY   cefdinir (OMNICEF) 300 MG Cap  Patient Yes No   Sig: cefdinir 300 mg capsule   diphenhydrAMINE (BENADRYL) 12.5 MG/5ML Liquid liquid  Patient Yes No   Sig: Take 25 mg by mouth 2 times daily with meals as needed. With the Doxycycline   etonogestrel (NEXPLANON) 68 MG Implant implant  Patient Yes No   Sig: Inject 1 Each as instructed Once.   fluconazole (DIFLUCAN) 100 MG Tab  Patient No No   Sig: Take 2 tabs today and then 1 tab daily until gone.   fluoxetine (PROZAC) 40 MG capsule  Patient No No   Sig: TAKE ONE CAPSULE BY MOUTH ONCE A DAY   gabapentin (NEURONTIN) 300 MG Cap  Patient Yes No   Sig: Take 300 mg by mouth every day.   insulin glargine (LANTUS SOLOSTAR) 100 UNIT/ML Solution Pen-injector injection  Patient No No   Sig: Inject 15 Units as instructed every evening.   Patient taking differently: Inject 12 Units as instructed every evening.   ipratropium-albuterol (DUONEB) 0.5-2.5 (3) MG/3ML nebulizer solution  Patient No No   Sig: 3 mL by Nebulization route every 6 hours as needed for Shortness of Breath.   levothyroxine (SYNTHROID) 100 MCG Tab  Patient No No   Sig: Take 1 Tab by mouth Every morning on an empty stomach.   Patient taking differently: Take 75 mcg by mouth Every morning on an empty stomach.   magnesium oxide (MAG-OX) 400 MG Tab tablet  Patient No No   Sig: Take 1 Tab by mouth every day.   methocarbamol (ROBAXIN) 750 MG Tab  Patient Yes No   Sig: Take 750 mg by mouth 4 times a day.   mycophenolate (CELLCEPT) 500 MG tablet  Patient Yes No   Sig: Take 1,000 mg by mouth 2 times a day.   nitrofurantoin (MACRODANTIN) 50 MG Cap  Patient Yes No   Sig: nitrofurantoin macrocrystal 50 mg capsule   Take 1 capsule as needed by oral  route.   Take 1 capsule following sexual intercourse   nystatin (MYCOSTATIN) 133128 UNIT/GM Cream topical cream  Patient Yes No   Sig: nystatin 100,000 unit/gram topical cream   APPLY TO THE AFFECTED AREA(S) BY TOPICAL ROUTE 2 TIMES PER DAY   nystatin/triamcinolone (MYCOLOG) 441531-5.1 UNIT/GM-% Cream  Patient No No   Sig: Apply to affected area 4 times daily.   ondansetron (ZOFRAN ODT) 4 MG TABLET DISPERSIBLE  Patient No No   Sig: Take 1 Tab by mouth every 6 hours as needed for Nausea.   potassium chloride SA (KDUR) 20 MEQ Tab CR  Patient Yes No   Sig: Take 20 mEq by mouth 2 times a day.   predniSONE (DELTASONE) 20 MG Tab  Patient No No   Sig: Take 1 Tab by mouth every day.   Patient taking differently: Take 15 mg by mouth every day.   pregabalin (LYRICA) 300 MG capsule  Patient Yes No   Sig: Take 300 mg by mouth 2 times a day.   prochlorperazine (COMPAZINE) 10 MG Tab  Patient No No   Sig: Take 1 Tab by mouth every 6 hours as needed for Nausea/Vomiting (headache).   Patient not taking: Reported on 7/10/2020   sodium bicarbonate (SODIUM BICARBONATE) 650 MG Tab  Patient Yes No   Sig: Take 650 mg by mouth 4 times a day.   tiotropium (SPIRIVA) 18 MCG Cap  Patient No No   Sig: Inhale 1 Cap by mouth every day.   topiramate (TOPAMAX) 25 MG Tab  Patient No No   Sig: Take 1 Tab by mouth every evening. Take 1 tablet before sleep nightly for 7 days then increase to 2 tablets before bedtime after that   traZODone (DESYREL) 100 MG Tab  Patient No No   Sig: TAKE  ONE TABLET BY MOUTH NIGHTLY AT BEDTIME   vitamin D (VITAMIND D3) 1000 UNIT Tab  Patient No No   Sig: Take 1 Tab by mouth every day.   ziprasidone (GEODON) 40 MG Cap  Patient No No   Sig: TAKE ONE CAPSULE BY MOUTH TWICE DAILY      Facility-Administered Medications: None       Physical Exam  Temp:  [36.6 °C (97.9 °F)] 36.6 °C (97.9 °F)  Pulse:  [79-94] 80  Resp:  [16-18] 16  BP: (126-154)/(70-79) 130/70  SpO2:  [95 %-99 %] 95 %    Physical Exam  Vitals signs and nursing  note reviewed. Exam conducted with a chaperone present.   Constitutional:       General: She is not in acute distress.     Appearance: She is obese. She is not ill-appearing, toxic-appearing or diaphoretic.   HENT:      Head: Normocephalic and atraumatic.      Nose: Nose normal.      Mouth/Throat:      Mouth: Mucous membranes are dry.      Pharynx: Oropharynx is clear.   Eyes:      Extraocular Movements: Extraocular movements intact.      Conjunctiva/sclera: Conjunctivae normal.      Pupils: Pupils are equal, round, and reactive to light.   Neck:      Musculoskeletal: Normal range of motion and neck supple.   Cardiovascular:      Rate and Rhythm: Normal rate and regular rhythm.      Pulses: Normal pulses.      Heart sounds: Normal heart sounds. No murmur. No gallop.    Pulmonary:      Effort: Pulmonary effort is normal.      Breath sounds: Normal breath sounds. No wheezing or rales.   Abdominal:      General: Abdomen is flat. Bowel sounds are normal.      Palpations: Abdomen is soft.      Tenderness: There is no abdominal tenderness. There is no guarding.   Musculoskeletal:         General: No swelling, tenderness, deformity or signs of injury.      Comments: Flaccid lower extremity   Skin:     General: Skin is warm and dry.      Capillary Refill: Capillary refill takes less than 2 seconds.   Neurological:      Mental Status: She is alert and oriented to person, place, and time. Mental status is at baseline.   Psychiatric:         Mood and Affect: Mood normal.         Judgment: Judgment normal.         Laboratory:  Recent Labs     08/15/20  0522   WBC 7.1   RBC 4.65   HEMOGLOBIN 13.7   HEMATOCRIT 41.4   MCV 89.0   MCH 29.5   MCHC 33.1*   RDW 43.0   PLATELETCT 194   MPV 11.3     Recent Labs     08/15/20  0306   SODIUM 138   POTASSIUM 3.3*   CHLORIDE 108   CO2 14*   GLUCOSE 91   BUN 19   CREATININE 0.74   CALCIUM 9.2     Recent Labs     08/15/20  0306   GLUCOSE 91         No results for input(s): NTPROBNP in the last  72 hours.      No results for input(s): TROPONINT in the last 72 hours.    Imaging:  US-CAROTID DOPPLER BILAT         CT-HEAD W/O   Final Result      No CT evidence of acute infarct, hemorrhage or mass.            Assessment/Plan:  I anticipate this patient will require at least two midnights for appropriate medical management, necessitating inpatient admission.    Optic neuritis- (present on admission)  Assessment & Plan  On glucocorticoid, follow-up ESR, CRP, carotid ultrasound, Doppler and ophthalmology consult    Hypokalemia- (present on admission)  Assessment & Plan  Repleted, follow-up magnesium and repeat potassium levels    Diabetes mellitus type 2 in obese (HCC)- (present on admission)  Assessment & Plan  Outpatient regiment with regular insulin sliding scale.  Follow-up A1c    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HCC)  Assessment & Plan  Stable at baseline nonambulatory state, requires exceptional care unobtainable in independent setting, follow-up discharge planning consult.    Blurred vision  Assessment & Plan  Presumed retinopathy, f/u ophthalmology consult, carotid Doppler ultrasound ordered    Peripheral neuropathy and chornic pain syndrome (CMS-HCC)- (present on admission)  Assessment & Plan  At risk of morbid obesity hypoventilation, avoid excessive medications limiting respiratory drive.

## 2020-08-15 NOTE — ED PROVIDER NOTES
"ED Provider Note    CHIEF COMPLAINT  Chief Complaint   Patient presents with   • Eye Pain     \"I have worms in my eye\"       HPI  Kristin Balderrama is a 29 y.o. female who presents with a chief complaint of worms in her eye.  She notes this started yesterday and she took a picture but is unable to access the picture for me.  The worms are causing her eyes to hurt and making her vision blurry.  She called her neuro-ophthalmologist who told her to present to the ER if her symptoms worsened which they have.  She is now starting to lose vision from the lateral aspect of her bilateral eyes.  She notes that it goes black and comes across her eye like a curtain.  It lasts for a minute or 2 and then resolves.  She has not taken any medication for her symptoms.    REVIEW OF SYSTEMS  See HPI for further details.  Blurry vision.  Worms in eye.  All other systems are negative.     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    SOCIAL HISTORY  Social History     Tobacco Use   • " Smoking status: Never Smoker   • Smokeless tobacco: Never Used   Substance and Sexual Activity   • Alcohol use: No     Alcohol/week: 0.0 oz   • Drug use: Not Currently     Frequency: 7.0 times per week     Types: Marijuana   • Sexual activity: Not Currently     Birth control/protection: Pill       SURGICAL HISTORY   has a past surgical history that includes neuro dest facet l/s w/ig sngl (4/21/2015); recovery (1/27/2016); delmar by laparoscopy (8/29/2010); lumbar fusion anterior (8/21/2012); other cardiac surgery (1/2016); tonsillectomy; bowel stimulator insertion (7/13/2016); gastroscopy with balloon dilatation (N/A, 1/4/2017); muscle biopsy (Right, 1/26/2017); other abdominal surgery; and laminotomy.    CURRENT MEDICATIONS  Home Medications     Reviewed by Pineda Ariza R.N. (Registered Nurse) on 08/15/20 at 0218  Med List Status: <None>   Medication Last Dose Status   acetaminophen (TYLENOL) 500 MG Tab  Active   acetaZOLAMIDE (DIAMOX) 250 MG Tab  Active   albuterol 108 (90 Base) MCG/ACT Aero Soln inhalation aerosol  Active   aspirin EC (ECOTRIN) 81 MG Tablet Delayed Response  Active   azithromycin (ZITHROMAX) 250 MG Tab  Active   busPIRone (BUSPAR) 10 MG Tab tablet  Active   cefdinir (OMNICEF) 300 MG Cap  Active   Coenzyme Q10 300 MG Cap  Active   diphenhydrAMINE (BENADRYL) 12.5 MG/5ML Liquid liquid  Active   etonogestrel (NEXPLANON) 68 MG Implant implant  Active   fluconazole (DIFLUCAN) 100 MG Tab  Active   fluoxetine (PROZAC) 40 MG capsule  Active   furosemide (LASIX) 80 MG Tab  Active   gabapentin (NEURONTIN) 300 MG Cap  Active   insulin glargine (LANTUS SOLOSTAR) 100 UNIT/ML Solution Pen-injector injection  Active   ipratropium-albuterol (DUONEB) 0.5-2.5 (3) MG/3ML nebulizer solution  Active   Ivabradine HCl (CORLANOR) 7.5 MG Tab  Active   levothyroxine (SYNTHROID) 100 MCG Tab  Active   magnesium oxide (MAG-OX) 400 MG Tab tablet  Active   Melatonin 10 MG Tab  Active   methocarbamol (ROBAXIN) 750 MG Tab   "Active   Mirabegron ER (MYRBETRIQ) 50 MG TABLET SR 24 HR  Active   mycophenolate (CELLCEPT) 500 MG tablet  Active   nitrofurantoin (MACRODANTIN) 50 MG Cap  Active   NON SPECIFIED  Active   nystatin (MYCOSTATIN) 230636 UNIT/GM Cream topical cream  Active   nystatin/triamcinolone (MYCOLOG) 720384-3.1 UNIT/GM-% Cream  Active   ondansetron (ZOFRAN ODT) 4 MG TABLET DISPERSIBLE  Active   OXcarbazepine (TRILEPTAL) 150 MG Tab  Active   potassium chloride SA (KDUR) 20 MEQ Tab CR  Active   predniSONE (DELTASONE) 20 MG Tab  Active   pregabalin (LYRICA) 300 MG capsule  Active   prochlorperazine (COMPAZINE) 10 MG Tab  Active   Riboflavin 400 MG Cap  Active   Rizatriptan Benzoate (MAXALT-MLT) 5 MG TABLET DISPERSIBLE  Active   sodium bicarbonate (SODIUM BICARBONATE) 650 MG Tab  Active   tiotropium (SPIRIVA) 18 MCG Cap  Active   topiramate (TOPAMAX) 25 MG Tab  Active   traZODone (DESYREL) 100 MG Tab  Active   TRULICITY 1.5 MG/0.5ML Solution Pen-injector  Active   vitamin D (VITAMIND D3) 1000 UNIT Tab  Active   ziprasidone (GEODON) 40 MG Cap  Active                ALLERGIES  Allergies   Allergen Reactions   • Depakote [Divalproex Sodium] Unspecified     Muscle spasms/muscle pain and weakness     • Amitriptyline Unspecified     Headaches     • Aripiprazole [Abilify] Unspecified     Headaches/muscle twitching     • Clindamycin Nausea     Even with food     • Flagyl [Metronidazole Hcl] Unspecified     \"eye problems\"     • Flomax [Tamsulosin Hydrochloride] Swelling   • Levaquin Unspecified     Severe muscle cramps in legs  RXN=unknown   • Metformin Unspecified     Increased lactic acid      • Tape Rash     Tears skin off  coban with Tegaderm tape ok intermittently  RXN=ongoing   • Vancomycin Itching     Pt becomes flushed in face and chest.   RXN=7/10/16   • Wound Dressing Adhesive Hives     By pt report   • Ciprofloxacin    • Hydromorphone Hcl      Pt states she feels loopy   • Keflex Rash     Pt states she gets a rash with this " medication  Tolerates ceftriaxone   • Levofloxacin Unspecified     Leg muscle cramps   • Metronidazole Rash     .   • Penicillins    • Tamsulosin    • Valproic Acid Rash     .       PHYSICAL EXAM  VITAL SIGNS: /79   Pulse 88   Temp 36.6 °C (97.9 °F) (Oral)   Resp 18   Wt 117 kg (257 lb 15 oz)   SpO2 98%   BMI 42.92 kg/m²    Pulse ox interpretation: I interpret this pulse ox as normal.  Constitutional: Alert in no apparent distress.  HENT: Normocephalic, atraumatic, bilateral external ears normal. Mucous membranes moist. Nose normal.   Eyes: Pupils are equal and reactive. Conjunctiva normal, non-icteric.  No drainage.  Extraocular muscle movements are intact.  Intraocular pressure in the left eye is 17, right eye 18.  Visual acuity in bilateral eyes is 20/60.  No obvious corneal lacerations or abrasions.  Heart: Regular rate and rythm, no murmurs.    Lungs: Clear to auscultation bilaterally.  Abdomen: Soft, nontender, normal bowel sounds.  Skin: Warm, dry, no erythema, no rash.  MSK: No obvious deformities.  Back: Normal alignment.  Neurologic: Alert, grossly non-focal.   Psychiatric: Affect normal, judgment normal, mood normal, appears appropriate and not intoxicated.     COURSE & MEDICAL DECISION MAKING  Pertinent Labs & Imaging studies reviewed. (See chart for details)  Per Dr. Casillas voice note written 5/21/2020 during an admission to the hospital, the patient has a history of clinically stable, chronic, relapsing inflammatory optic neuropathy (crown syndrome).  Additionally, she has idiopathic intracranial hypertension on associated with papilledema.    This is a very medically complicated 29-year-old female who is here with vision complaints.  Difficult to come up with a good differential diagnosis due to her significant and complicated medical history but potentially retinal pathology, corneal abrasion, stroke, increased intracranial pressure, amaurosis fugax.  Arrives afebrile with normal vital  signs.  Appears well-hydrated and nontoxic.  Initially does not want to open her eyes at all because she notes that it is painful.  After instillation of proparacaine she was able to open her eyes and I was able to get an exam.  Her extraocular muscles are intact.  Pupils are equal, round, reactive.  There is no drainage from either eye.  Her pressures are normal bilaterally.  A visual acuity test revealed 20/60 in bilateral eyes.    Labs and CT head are remarkable only for mild hypokalemia to 3.3 which will be repleted orally.      I discussed the patient with Dr. Carrera, neuro-ophthalmology, who has recommended ophthalmology consultation to evaluate for retinal pathology as well as a carotid Doppler and ultrasound.  He notes that she does have a history of idiopathic intracranial hypertension currently on Diamox but this is stable and she has never had papilledema.  She will be a placement issue as she is unable to ambulate, cannot see adequately, and lives at home with her grandparents who are both ill presently and cannot take care of her.    Patient was discussed with ophthalmology, Dr. Aparicio, who will evaluate her after she is transferred to the floor.    The patient was discussed with the hospitalist as she is unsafe for discharge and was admitted in guarded condition.    FINAL IMPRESSION  1.  Vision abnormalities    Electronically signed by: Dino Morales M.D., 8/15/2020 2:24 AM

## 2020-08-15 NOTE — ASSESSMENT & PLAN NOTE
Lantus decreased to 8U qhs  Follow-up A1c is 5  Glucose well controlled  9/7: Noted to have normal low blood glucoses.  Likely due to poor p.o. intake.  Will monitor for today and consider reducing/stopping long acting insulin and initiate sliding scale.

## 2020-08-15 NOTE — ED NOTES
Med Rec Updated and Complete per Pt at bedside  Allergies Reviewed    Pt on maintenance dose of Macrobid 100mg once daily, as well as Prednisone 10mg daily.    Pt taking LD ASA daily.

## 2020-08-15 NOTE — CONSULTS
"CC: decreased vision OU, floaters OU    HPI: 29 y.o. female presenting with complaint of blurred vision x 2 days, constant, has had episodic blurry vision in the past but this \"seems worse than normal\", +headache.  + episodes of vision loss OU simultaneously x 1 minute, 2 episode yesterday. Saw new floaters/\"worms\" in both eyes yesterday and today. Occasional flash of light. Hx of repeat lumbar punctures per the patient when her \"brain pressure is high\". CT head negative in ED. Emergency room provider consulted neuro-ophthalmology recommending carotid Doppler, which did not show anything remarkable.       Of note the patient states she is unable to return to an independent setting as the grandmother with whom she lives is unable to continue to care for her.      POHx: follows Dr. Yousif, hx of chronic relapsing inflammatory optic neuropathy, pseudotumor cerebri with recurrent papilledema, ?RE    Past Medical History:   Diagnosis Date   • Abdominal pain    • Anginal syndrome     random chest pain especially with tachycardia   • Apnea, sleep    • Arrhythmia     \"sinus tachycardia\", cariologist, Dr. Kumar; ablation 2/2016   • Arthritis     osteo   • ASTHMA     when around smoke   • Atrial fibrillation (HCC)    • Back pain    • Borderline personality disorder (HCC)    • Breath shortness     with tachycardia   • Bronchitis    • Cardiac arrhythmia    • Chickenpox    • Chronic UTI 9/18/2010   • Cough    • Daytime sleepiness    • Depression    • Diabetes (HCC)    • Diarrhea    • Disorder of thyroid    • Fall    • Fatigue    • Frequent headaches    • Gasping for breath    • Gynecological disorder     PCOS   • Headache(784.0)    • Heart burn    • History of falling    • Hypertension    • Indigestion    • Migraine    • 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HCC)    • 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HCC)    • Nausea    • Obesity    • Other fatigue 6/29/2020   • Pain 08-15-12    back, 7/10   • Painful joint    • PCOS (polycystic ovarian " "syndrome)    • Pneumonia 2012   • Psychosis (HCC)    • Renal disorder     \"kidney disease, stage 1\" nephrologist, Dr. 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   • Urinary bladder disorder     Suprapubic cath   • Urinary incontinence    • Weakness    • Wears glasses        ziprasidone, 40 mg, Oral, DAILY  traZODone, 100 mg, Oral, QHS  topiramate, 50 mg, Oral, Q EVENING  sodium bicarbonate, 650 mg, Oral, 4XDAY  pregabalin, 300 mg, Oral, BID  predniSONE, 10 mg, Oral, DAILY  levothyroxine, 75 mcg, Oral, AM ES  insulin glargine, 12 Units, Subcutaneous, Q EVENING  gabapentin, 300 mg, Oral, QHS  FLUoxetine, 40 mg, Oral, DAILY  aspirin EC, 81 mg, Oral, DAILY  senna-docusate, 2 Tab, Oral, BID  enoxaparin, 40 mg, Subcutaneous, DAILY  insulin regular, 1-6 Units, Subcutaneous, Q6HRS  docusate sodium, 100 mg, Oral, BID        Social History     Tobacco Use   • Smoking status: Never Smoker   • Smokeless tobacco: Never Used   Substance Use Topics   • Alcohol use: No     Alcohol/week: 0.0 oz   • Drug use: Not Currently     Frequency: 7.0 times per week     Types: Marijuana     Family History   Problem Relation Age of Onset   • Hypertension Mother    • Sleep Apnea Mother    • Heart Disease Mother    • Other Mother         hypothryod   • Hypertension Maternal Uncle    • Heart Disease Maternal Grandmother    • Hypertension Maternal Grandmother    • No Known Problems Sister    • Other Sister         Narcolepsy;fibromyalsia;bone;nerve   • Genitourinary () Problems Sister         endometriosis       EXAM:  Vitals:    08/15/20 1345   BP:    Pulse:    Resp:    Temp: 35.9 °C (96.7 °F)   SpO2:      VA 26pt OU sc (pt states no glasses wear)  Ext - atraumatic, mild ptosis OU (+photophobic " OU)  Pupils - reactive Ou, no APD  Icare 17/16  Confrontation Fields - decreased OU diffusely with central sparing (uncertain if her baseline peripheral vision is full)  Motiltiy - grossly full OU, poorly tracks OU  SLE - clear conj, cornea, and lens  DFE - no retinal tears, detachment, or hemorrhages. No optic nerve head edema OU.    A/P:  28yo female with complex medical history, DM, pseudotumor cerebri, and relapsing inflammatory optic neuropathy with new subjective visual disturbances -     - No evidence of retinal detachment or hemorrhage or optic nerve head edema on exam. - Follow neuro recs while in hospital. Consider MRI brain and orbits to assess for recurrence of inflammatory optic neuropathy, if clinical indicated.    - Recommend follow-up with Dr. Yousif after discharge.     Bright Aparicio M.D.  [facility]  [phone]

## 2020-08-15 NOTE — PROGRESS NOTES
"Called to room by CNA. Pt found on floor. Prior to fall pt had eye dilated by ophthalmology. Pt states \"I don't remember what happened.\" Lifted back into bed by staff. Vitals taken. /83, R 18, P 72.  Pt A&O 4. Aside from dilated eyes, neuro exam intact. Pt not sure if she hit her head. Dr. Mayfield notified.   "

## 2020-08-15 NOTE — ED TRIAGE NOTES
"Kristin Balderrama  29 y.o. female  Chief Complaint   Patient presents with   • Eye Pain     \"I have worms in my eye\"   BIB ems for above.    /79   Pulse 88   Temp 36.6 °C (97.9 °F) (Oral)   Resp 18   Wt 117 kg (257 lb 15 oz)   SpO2 98%   BMI 42.92 kg/m²     "

## 2020-08-15 NOTE — ED NOTES
"ED Positive Culture Follow-up/Notification Note:    Date: 8/15/20    Patient seen in the ED on 8/12/2020 for a jara catheter problem for a suprapubic catheter placed on 8/6/20 which she reports the discomfort is chronic for her. Patient is wheelchair bound and patient reported she felt like her catheter was not draining enough. She followed up with her urologist on 8/12 who advised her that if she was having discomfort or felt it was not draining she could flush it.  Patient reports that she has flushed 3 times today and it is flushing clear fluid that is not bloody and is draining intermittently.  1. Problem with Jara catheter, initial encounter (HCC)       Discharge Medication List as of 8/12/2020  1:11 AM          Allergies: Depakote [divalproex sodium], Amitriptyline, Aripiprazole [abilify], Clindamycin, Flagyl [metronidazole hcl], Flomax [tamsulosin hydrochloride], Levaquin, Metformin, Tape, Vancomycin, Wound dressing adhesive, Ciprofloxacin, Hydromorphone hcl, Keflex, Levofloxacin, Metronidazole, Penicillins, Tamsulosin, and Valproic acid     Vitals:    08/12/20 0032 08/12/20 0033 08/12/20 0201   BP: 143/63  110/60   Pulse: 68  67   Resp: 18     Temp: 35.9 °C (96.7 °F)  36.3 °C (97.3 °F)   TempSrc: Temporal  Temporal   SpO2: 98%  98%   Weight:  116.6 kg (257 lb 0.9 oz)    Height:  1.651 m (5' 5\")        Final cultures:   Results     Procedure Component Value Units Date/Time    URINE CULTURE(NEW) [[id number]]  (Abnormal)  (Susceptibility) Collected: 08/12/20 0106    Order Status: Completed Specimen: Urine, Jara Cath Updated: 08/14/20 0843     Significant Indicator POS     Source UR     Site URINE, JARA CATH     Culture Result -      Klebsiella oxytoca ESBL  >100,000 cfu/mL  Extended Spectrum Beta-lactamase (ESBL) isolated.  ESBL's may be clinically resistant to therapy with  Penicillins,Cephalosporins or Aztreonam despite  apparent in vitro susceptibility to some of these agents.  The patient requires " "contact isolation.  Please contact pharmacy or an Infectious Disease Specialist  if you have any questions about appropriate therapy.      Narrative:      CALL  [facility]  [facility] tel. [phone],  CALLED  [facility] tel. [phone] 08/14/2020, 08:43, RB PERF. RESULTS CALLED TO:  [phone]  Indication for culture:->Patient WITHOUT an indwelling Andrade  catheter in place with new onset of Dysuria, Frequency,  Urgency, and/or Suprapubic pain  Indication for culture:->Patient WITHOUT an indwelling Andrade    Susceptibility     Klebsiella oxytoca esbl (1)     Antibiotic Interpretation Microscan Method Status    Ampicillin Resistant >16 mcg/mL TYLER Final    Ceftriaxone Extended Spectrum Beta Lactamase >32 mcg/mL TYLER Final    Ceftazidime Resistant <=1 mcg/mL TYLER Final    Cefotaxime Extended Spectrum Beta Lactamase 8 mcg/mL TYLER Final    Cefazolin Resistant >16 mcg/mL TYLER Final    Ciprofloxacin Sensitive <=1 mcg/mL TYLER Final    Ampicillin/sulbactam Resistant >16/8 mcg/mL TYLER Final    Cefepime Resistant 4 mcg/mL TYLER Final    Tobramycin Sensitive <=4 mcg/mL TYLER Final    Cefotetan Sensitive <=16 mcg/mL TYLER Final    Nitrofurantoin Resistant >64 mcg/mL TYLER Final    Gentamicin Sensitive <=4 mcg/mL TYLER Final    Levofloxacin Sensitive <=2 mcg/mL TYLER Final    Pip/Tazobactam Resistant >64 mcg/mL TYLER Final    Trimeth/Sulfa Resistant >2/38 mcg/mL TYLER Final                         Plan:   Per doctor's note, there was no evidence of infection surrounding the suprapubic catheter site.     Patient admitted inpatient this morning at [facility] for complaints of \"bugs in her eyes\" and blurry vision. No complaints about increasing suprapubic pain or worsen symptoms.       Comfort Charles, PharmD  T: [phone]    "

## 2020-08-15 NOTE — ASSESSMENT & PLAN NOTE
At risk of morbid obesity hypoventilation, avoid excessive medications limiting respiratory drive.

## 2020-08-15 NOTE — PROGRESS NOTES
2 RN Skin Check    2 RN skin check complete w/ SONYA Gamble  Devices in place: heart monitor, suprapubic catheter.  Skin assessed under devices: yes.  Confirmed pressure ulcers found on: n/a.  New potential pressure ulcers noted on n/a. Wound consult placed n/a.  The following interventions in place Pillows and Lotion.     Scabbing on L breast, BL ears intact, BL elbows intact, sacrum pink and blanching, spine intact, BL heels dry and flakey.

## 2020-08-15 NOTE — PROGRESS NOTES
Pt arrived to unit via gurney at 1030. Pt oriented to room, unit, and plan of care. Tele-monitor placed and monitor room notified. All questions answered at this time. Call light within reach; fall precautions in place.

## 2020-08-15 NOTE — CARE PLAN
Problem: Safety  Goal: Will remain free from falls  Outcome: PROGRESSING SLOWER THAN EXPECTED     Problem: Knowledge Deficit  Goal: Knowledge of disease process/condition, treatment plan, diagnostic tests, and medications will improve  Outcome: PROGRESSING AS EXPECTED

## 2020-08-16 LAB
ANION GAP SERPL CALC-SCNC: 14 MMOL/L (ref 7–16)
BUN SERPL-MCNC: 17 MG/DL (ref 8–22)
CALCIUM SERPL-MCNC: 9.4 MG/DL (ref 8.5–10.5)
CHLORIDE SERPL-SCNC: 113 MMOL/L (ref 96–112)
CO2 SERPL-SCNC: 18 MMOL/L (ref 20–33)
CREAT SERPL-MCNC: 0.79 MG/DL (ref 0.5–1.4)
EKG IMPRESSION: NORMAL
ERYTHROCYTE [DISTWIDTH] IN BLOOD BY AUTOMATED COUNT: 42.4 FL (ref 35.9–50)
GLUCOSE BLD-MCNC: 106 MG/DL (ref 65–99)
GLUCOSE BLD-MCNC: 76 MG/DL (ref 65–99)
GLUCOSE BLD-MCNC: 79 MG/DL (ref 65–99)
GLUCOSE BLD-MCNC: 91 MG/DL (ref 65–99)
GLUCOSE BLD-MCNC: 99 MG/DL (ref 65–99)
GLUCOSE SERPL-MCNC: 85 MG/DL (ref 65–99)
HCT VFR BLD AUTO: 41.4 % (ref 37–47)
HGB BLD-MCNC: 13.3 G/DL (ref 12–16)
MCH RBC QN AUTO: 29.1 PG (ref 27–33)
MCHC RBC AUTO-ENTMCNC: 32.1 G/DL (ref 33.6–35)
MCV RBC AUTO: 90.6 FL (ref 81.4–97.8)
PLATELET # BLD AUTO: 190 K/UL (ref 164–446)
PMV BLD AUTO: 11.5 FL (ref 9–12.9)
POTASSIUM SERPL-SCNC: 3.5 MMOL/L (ref 3.6–5.5)
RBC # BLD AUTO: 4.57 M/UL (ref 4.2–5.4)
SODIUM SERPL-SCNC: 145 MMOL/L (ref 135–145)
WBC # BLD AUTO: 5.6 K/UL (ref 4.8–10.8)

## 2020-08-16 PROCEDURE — 700111 HCHG RX REV CODE 636 W/ 250 OVERRIDE (IP): Performed by: INTERNAL MEDICINE

## 2020-08-16 PROCEDURE — A9270 NON-COVERED ITEM OR SERVICE: HCPCS | Performed by: HOSPITALIST

## 2020-08-16 PROCEDURE — 700102 HCHG RX REV CODE 250 W/ 637 OVERRIDE(OP): Performed by: INTERNAL MEDICINE

## 2020-08-16 PROCEDURE — 700102 HCHG RX REV CODE 250 W/ 637 OVERRIDE(OP): Performed by: HOSPITALIST

## 2020-08-16 PROCEDURE — 85027 COMPLETE CBC AUTOMATED: CPT

## 2020-08-16 PROCEDURE — A9270 NON-COVERED ITEM OR SERVICE: HCPCS | Performed by: INTERNAL MEDICINE

## 2020-08-16 PROCEDURE — 36415 COLL VENOUS BLD VENIPUNCTURE: CPT

## 2020-08-16 PROCEDURE — 770001 HCHG ROOM/CARE - MED/SURG/GYN PRIV*

## 2020-08-16 PROCEDURE — 99233 SBSQ HOSP IP/OBS HIGH 50: CPT | Performed by: HOSPITALIST

## 2020-08-16 PROCEDURE — 700112 HCHG RX REV CODE 229: Performed by: INTERNAL MEDICINE

## 2020-08-16 PROCEDURE — 80048 BASIC METABOLIC PNL TOTAL CA: CPT

## 2020-08-16 PROCEDURE — 93010 ELECTROCARDIOGRAM REPORT: CPT | Performed by: INTERNAL MEDICINE

## 2020-08-16 PROCEDURE — 700101 HCHG RX REV CODE 250: Performed by: INTERNAL MEDICINE

## 2020-08-16 PROCEDURE — 94760 N-INVAS EAR/PLS OXIMETRY 1: CPT

## 2020-08-16 PROCEDURE — 82962 GLUCOSE BLOOD TEST: CPT | Mod: 91

## 2020-08-16 PROCEDURE — 93005 ELECTROCARDIOGRAM TRACING: CPT | Performed by: HOSPITALIST

## 2020-08-16 PROCEDURE — 700111 HCHG RX REV CODE 636 W/ 250 OVERRIDE (IP): Performed by: HOSPITALIST

## 2020-08-16 RX ORDER — TOPIRAMATE 25 MG/1
50 TABLET ORAL 2 TIMES DAILY
Status: DISCONTINUED | OUTPATIENT
Start: 2020-08-17 | End: 2020-09-14 | Stop reason: HOSPADM

## 2020-08-16 RX ORDER — ZIPRASIDONE HYDROCHLORIDE 40 MG/1
40 CAPSULE ORAL 2 TIMES DAILY
Status: DISCONTINUED | OUTPATIENT
Start: 2020-08-17 | End: 2020-09-14 | Stop reason: HOSPADM

## 2020-08-16 RX ORDER — OXCARBAZEPINE 150 MG/1
150 TABLET, FILM COATED ORAL 2 TIMES DAILY
Status: DISCONTINUED | OUTPATIENT
Start: 2020-08-16 | End: 2020-09-14 | Stop reason: HOSPADM

## 2020-08-16 RX ORDER — GABAPENTIN 300 MG/1
300 CAPSULE ORAL 3 TIMES DAILY
Status: DISCONTINUED | OUTPATIENT
Start: 2020-08-17 | End: 2020-09-14 | Stop reason: HOSPADM

## 2020-08-16 RX ORDER — POTASSIUM CHLORIDE 20 MEQ/1
40 TABLET, EXTENDED RELEASE ORAL ONCE
Status: COMPLETED | OUTPATIENT
Start: 2020-08-16 | End: 2020-08-16

## 2020-08-16 RX ADMIN — MYCOPHENOLATE MOFETIL 1000 MG: 250 CAPSULE ORAL at 05:08

## 2020-08-16 RX ADMIN — LIDOCAINE HYDROCHLORIDE 30 ML: 20 SOLUTION OROPHARYNGEAL at 09:57

## 2020-08-16 RX ADMIN — ACETAMINOPHEN 500 MG: 500 TABLET ORAL at 08:09

## 2020-08-16 RX ADMIN — IPRATROPIUM BROMIDE AND ALBUTEROL SULFATE 3 ML: .5; 3 SOLUTION RESPIRATORY (INHALATION) at 17:19

## 2020-08-16 RX ADMIN — ACETAZOLAMIDE 1500 MG: 500 CAPSULE, EXTENDED RELEASE ORAL at 05:09

## 2020-08-16 RX ADMIN — SODIUM BICARBONATE 650 MG: 650 TABLET ORAL at 08:08

## 2020-08-16 RX ADMIN — GABAPENTIN 300 MG: 300 CAPSULE ORAL at 20:30

## 2020-08-16 RX ADMIN — PREGABALIN 300 MG: 150 CAPSULE ORAL at 05:10

## 2020-08-16 RX ADMIN — OXCARBAZEPINE 150 MG: 150 TABLET, FILM COATED ORAL at 21:54

## 2020-08-16 RX ADMIN — SODIUM BICARBONATE 650 MG: 650 TABLET ORAL at 05:09

## 2020-08-16 RX ADMIN — ACETAZOLAMIDE 1000 MG: 500 CAPSULE, EXTENDED RELEASE ORAL at 17:57

## 2020-08-16 RX ADMIN — ZIPRASIDONE HCL 40 MG: 40 CAPSULE ORAL at 05:09

## 2020-08-16 RX ADMIN — DOCUSATE SODIUM 50 MG AND SENNOSIDES 8.6 MG 2 TABLET: 8.6; 5 TABLET, FILM COATED ORAL at 05:11

## 2020-08-16 RX ADMIN — TOPIRAMATE 50 MG: 100 TABLET, FILM COATED ORAL at 17:58

## 2020-08-16 RX ADMIN — DOCUSATE SODIUM 100 MG: 100 CAPSULE, LIQUID FILLED ORAL at 05:11

## 2020-08-16 RX ADMIN — LEVOTHYROXINE SODIUM 75 MCG: 0.07 TABLET ORAL at 05:10

## 2020-08-16 RX ADMIN — ENOXAPARIN SODIUM 40 MG: 40 INJECTION SUBCUTANEOUS at 05:11

## 2020-08-16 RX ADMIN — ZIPRASIDONE HYDROCHLORIDE 40 MG: 40 CAPSULE ORAL at 21:53

## 2020-08-16 RX ADMIN — ASPIRIN 81 MG: 81 TABLET, COATED ORAL at 05:11

## 2020-08-16 RX ADMIN — SODIUM BICARBONATE 650 MG: 650 TABLET ORAL at 17:58

## 2020-08-16 RX ADMIN — PREDNISONE 10 MG: 10 TABLET ORAL at 05:09

## 2020-08-16 RX ADMIN — TRAZODONE HYDROCHLORIDE 100 MG: 100 TABLET ORAL at 20:29

## 2020-08-16 RX ADMIN — MYCOPHENOLATE MOFETIL 1000 MG: 250 CAPSULE ORAL at 17:59

## 2020-08-16 RX ADMIN — POTASSIUM CHLORIDE 40 MEQ: 1500 TABLET, EXTENDED RELEASE ORAL at 08:09

## 2020-08-16 RX ADMIN — SODIUM BICARBONATE 650 MG: 650 TABLET ORAL at 20:30

## 2020-08-16 RX ADMIN — IVABRADINE 7.5 MG: 7.5 TABLET, FILM COATED ORAL at 20:30

## 2020-08-16 RX ADMIN — PREGABALIN 300 MG: 150 CAPSULE ORAL at 17:59

## 2020-08-16 RX ADMIN — FLUOXETINE 40 MG: 20 CAPSULE ORAL at 05:10

## 2020-08-16 ASSESSMENT — ENCOUNTER SYMPTOMS
VOMITING: 0
WEIGHT LOSS: 0
TREMORS: 0
ORTHOPNEA: 0
PALPITATIONS: 0
DIZZINESS: 0
NAUSEA: 0
DIARRHEA: 0
BACK PAIN: 0
BLURRED VISION: 1
MYALGIAS: 0
NECK PAIN: 0
SENSORY CHANGE: 0
HEARTBURN: 0
DEPRESSION: 0
TINGLING: 0
COUGH: 0
ABDOMINAL PAIN: 0
CLAUDICATION: 0
FEVER: 0
CHILLS: 0
SPUTUM PRODUCTION: 0
HALLUCINATIONS: 0
HEADACHES: 0

## 2020-08-16 ASSESSMENT — LIFESTYLE VARIABLES: SUBSTANCE_ABUSE: 0

## 2020-08-16 ASSESSMENT — FIBROSIS 4 INDEX: FIB4 SCORE: 0.33

## 2020-08-16 NOTE — PROGRESS NOTES
Pt complains of 8/10 dull, aching chest pain. Ordered stat EKG, obtained VS. Dr. Hooker notified. Dr. Hooker to put in new orders. Will continue to monitor.

## 2020-08-16 NOTE — ASSESSMENT & PLAN NOTE
History of  She is followed by Dr. Yousif, neuro-ophthalmology   continue home Diamox  Supportive care as suggested by neuro-ophthalmology

## 2020-08-16 NOTE — PROGRESS NOTES
Assumed care of pt. Bedside report received from night SONYA Casey. Pt was updated on plan of care. Call light, phone and personal belongings within reach. Bed alarm on and working appropriately.

## 2020-08-16 NOTE — PROGRESS NOTES
[facility] Medicine Daily Progress Note    Date of Service  8/16/2020    Chief Complaint  29 y.o. female admitted 8/15/2020 with blurry vision      Interval Problem Update  Her vision issues are acute on chronic , as per patient    She was seen by eye md- no ocular emergency seen    Eye md recommends  Neuro ophthalmology follow up    Patient can not have mri brain- she has hardware    No need for tele    Case d/w  about placement options    Low potassium 3.5    Consultants/Specialty  Eye md    Code Status  Full Code    Disposition  home    Review of Systems  Review of Systems   Constitutional: Negative for chills, fever and weight loss.   HENT: Negative for ear discharge, ear pain and hearing loss.    Eyes: Positive for blurred vision.   Respiratory: Negative for cough and sputum production.    Cardiovascular: Negative for chest pain, palpitations, orthopnea and claudication.   Gastrointestinal: Negative for abdominal pain, diarrhea, heartburn, nausea and vomiting.   Genitourinary: Negative for dysuria, frequency and urgency.   Musculoskeletal: Negative for back pain, myalgias and neck pain.   Neurological: Negative for dizziness, tingling, tremors, sensory change and headaches.   Psychiatric/Behavioral: Negative for depression, hallucinations, substance abuse and suicidal ideas.        Physical Exam  Temp:  [35.9 °C (96.7 °F)-37.4 °C (99.3 °F)] 37.4 °C (99.3 °F)  Pulse:  [70-92] 92  Resp:  [16-18] 16  BP: (114-142)/(66-83) 121/78  SpO2:  [93 %-98 %] 93 %    Physical Exam  Constitutional:       Appearance: She is obese. She is ill-appearing.   HENT:      Head: Normocephalic and atraumatic.   Eyes:      General: No scleral icterus.  Neck:      Musculoskeletal: Normal range of motion and neck supple.   Cardiovascular:      Rate and Rhythm: Normal rate and regular rhythm.      Heart sounds: No murmur. No friction rub. No gallop.    Pulmonary:      Effort: Pulmonary effort is normal. No respiratory distress.       Breath sounds: No stridor. No wheezing or rhonchi.   Chest:      Chest wall: No tenderness.   Abdominal:      General: There is distension.      Palpations: There is no mass.      Tenderness: There is no abdominal tenderness. There is no guarding or rebound.      Hernia: No hernia is present.   Musculoskeletal: Normal range of motion.         General: No swelling, tenderness, deformity or signs of injury.      Right lower leg: No edema.   Skin:     General: Skin is warm.      Coloration: Skin is not jaundiced or pale.      Findings: No bruising, erythema, lesion or rash.   Neurological:      Mental Status: She is alert and oriented to person, place, and time.      Comments: B/l leg weakness   Psychiatric:         Mood and Affect: Mood normal.         Fluids    Intake/Output Summary (Last 24 hours) at 8/16/2020 1016  Last data filed at 8/16/2020 0404  Gross per 24 hour   Intake --   Output 600 ml   Net -600 ml       Laboratory  Recent Labs     08/15/20  0522 08/16/20  0321   WBC 7.1 5.6   RBC 4.65 4.57   HEMOGLOBIN 13.7 13.3   HEMATOCRIT 41.4 41.4   MCV 89.0 90.6   MCH 29.5 29.1   MCHC 33.1* 32.1*   RDW 43.0 42.4   PLATELETCT 194 190   MPV 11.3 11.5     Recent Labs     08/15/20  0306 08/16/20  0321   SODIUM 138 145   POTASSIUM 3.3* 3.5*   CHLORIDE 108 113*   CO2 14* 18*   GLUCOSE 91 85   BUN 19 17   CREATININE 0.74 0.79   CALCIUM 9.2 9.4                   Imaging  US-CAROTID DOPPLER BILAT   Final Result      CT-HEAD W/O   Final Result      No CT evidence of acute infarct, hemorrhage or mass.           Assessment/Plan  Optic neuritis- (present on admission)  Assessment & Plan  Continue outpatient prednisone f  ophthalmology consulted, Dr. Aparicio  Restart home Cellcept     Normal esr and crp    Hypokalemia- (present on admission)  Assessment & Plan  Replace po    Diabetes mellitus type 2 in obese (HCC)- (present on admission)  Assessment & Plan  Continue outpatient regiment with regular insulin sliding scale.     Insulin dependent  Follow-up A1c is 5    Morbidly obese (HCC)- (present on admission)  Assessment & Plan  BMI 42    Idiopathic intracranial hypertension- (present on admission)  Assessment & Plan  History of  She is followed by Dr. Yousif, neuro-ophthalmology   Restart her home Diamox    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HCC)- (present on admission)  Assessment & Plan  Stable at baseline nonambulatory state, requires exceptional care unobtainable in independent setting, follow-up discharge planning consult.    Blurred vision  Assessment & Plan  Presumed retinopathy, f/u ophthalmology consult, carotid Doppler ultrasound ordered    Schizophrenia ([facility])- (present on admission)  Assessment & Plan  Reported history of   Continue Geodon and Prozac    Peripheral neuropathy and chornic pain syndrome (CMS-HCC)- (present on admission)  Assessment & Plan  At risk of morbid obesity hypoventilation, avoid excessive medications limiting respiratory drive.       VTE prophylaxis: scd    Transfer to neuro

## 2020-08-16 NOTE — CARE PLAN
Problem: Communication  Goal: The ability to communicate needs accurately and effectively will improve  Outcome: PROGRESSING AS EXPECTED     Problem: Safety  Goal: Will remain free from injury  Outcome: PROGRESSING AS EXPECTED     Problem: Infection  Goal: Will remain free from infection  Outcome: PROGRESSING AS EXPECTED     Problem: Venous Thromboembolism (VTW)/Deep Vein Thrombosis (DVT) Prevention:  Goal: Patient will participate in Venous Thrombosis (VTE)/Deep Vein Thrombosis (DVT)Prevention Measures  Outcome: PROGRESSING AS EXPECTED     Problem: Bowel/Gastric:  Goal: Normal bowel function is maintained or improved  Outcome: PROGRESSING AS EXPECTED  Goal: Will not experience complications related to bowel motility  Outcome: PROGRESSING AS EXPECTED     Problem: Knowledge Deficit  Goal: Knowledge of disease process/condition, treatment plan, diagnostic tests, and medications will improve  Outcome: PROGRESSING AS EXPECTED  Goal: Knowledge of the prescribed therapeutic regimen will improve  Outcome: PROGRESSING AS EXPECTED     Problem: Fluid Volume:  Goal: Will maintain balanced intake and output  Outcome: PROGRESSING AS EXPECTED     Problem: Respiratory:  Goal: Respiratory status will improve  Outcome: PROGRESSING AS EXPECTED     Problem: Pain Management  Goal: Pain level will decrease to patient's comfort goal  Outcome: PROGRESSING AS EXPECTED     Problem: Skin Integrity  Goal: Risk for impaired skin integrity will decrease  Outcome: PROGRESSING AS EXPECTED     Problem: Urinary Elimination:  Goal: Ability to reestablish a normal urinary elimination pattern will improve  Outcome: PROGRESSING AS EXPECTED

## 2020-08-16 NOTE — DIETARY
NUTRITION SERVICES: BMI - Pt with BMI >40 (=Body mass index is 42.96 kg/m².), morbid obesity. Weight loss counseling not appropriate in acute care setting. RECOMMEND - Referral to outpatient nutrition services for weight management after D/C.

## 2020-08-16 NOTE — PROGRESS NOTES
[facility] Medicine Daily Progress Note    Date of Service  8/15/2020    Chief Complaint  29 y.o. female admitted 8/15/2020 with eye pain.    Hospital Course    Ms. Balderrama has a history of afib, IDDM, as well as transverse myelitis that is followed by Dr. Yousif, neuro-ophthalmology due to optic neuritis and idiopathic intracranial hypertension.  He presented to the emergency room with eye pain and blurry vision. Dr. Yousif was called and recommended ophthalmology consultation as well as further work-up.  She was admitted to the telemetry floor was seen by ophthalmology, Dr. Aparicio today.       Interval Problem Update  8/15: Patient seen and evaluated on the telemetry floor.  I discussed with her nurse and she has been in sinus rhythm.  Her nurse states that they found patient on the floor.  Patient states she does not home how she got to the floor and fortunately she has not sustained any injuries.  When I was in the room, she would not open her eyes she states they are in too much pain.  She has been seen by ophthalmology today.    Consultants/Specialty  [facility]     Code Status  Full Code    Disposition  Medical     Review of Systems  Review of Systems   Constitutional: Negative for fever.   Eyes: Positive for blurred vision and pain.   Respiratory: Negative for shortness of breath.    All other systems reviewed and are negative.       Physical Exam  Temp:  [35.9 °C (96.7 °F)-36.6 °C (97.9 °F)] 35.9 °C (96.7 °F)  Pulse:  [69-94] 72  Resp:  [16-18] 18  BP: (126-154)/(70-83) 142/83  SpO2:  [95 %-99 %] 98 %    Physical Exam  Vitals signs and nursing note reviewed.   Constitutional:       Appearance: She is obese. She is not toxic-appearing.   HENT:      Head: Normocephalic and atraumatic.      Mouth/Throat:      Mouth: Mucous membranes are dry.      Pharynx: Oropharynx is clear.   Eyes:      Comments: She won't open her eyes   Neck:      Musculoskeletal: Normal range of motion and neck supple.    Cardiovascular:      Rate and Rhythm: Normal rate and regular rhythm.      Comments: sinus  Pulmonary:      Effort: Pulmonary effort is normal.      Breath sounds: Normal breath sounds.   Abdominal:      General: There is no distension.      Tenderness: There is no abdominal tenderness.      Comments: Suprapubic catheter   Musculoskeletal:      Right lower leg: No edema.      Left lower leg: No edema.   Neurological:      Mental Status: She is alert.      Comments: She moves her arms and hands   Psychiatric:      Comments: Rather odd affect         Fluids  No intake or output data in the 24 hours ending 08/15/20 1840    Laboratory  Recent Labs     08/15/20  0522   WBC 7.1   RBC 4.65   HEMOGLOBIN 13.7   HEMATOCRIT 41.4   MCV 89.0   MCH 29.5   MCHC 33.1*   RDW 43.0   PLATELETCT 194   MPV 11.3     Recent Labs     08/15/20  0306   SODIUM 138   POTASSIUM 3.3*   CHLORIDE 108   CO2 14*   GLUCOSE 91   BUN 19   CREATININE 0.74   CALCIUM 9.2                   Imaging  US-CAROTID DOPPLER BILAT   Final Result      CT-HEAD W/O   Final Result      No CT evidence of acute infarct, hemorrhage or mass.           Assessment/Plan  Optic neuritis- (present on admission)  Assessment & Plan  Continue outpatient prednisone follow-up ESR, CRP, carotid ultrasound, Doppler and   ophthalmology consulted, Dr. Aparicio  Restart home Cellcept     Hypokalemia- (present on admission)  Assessment & Plan  K low at 3.3 on admit.  This has been repleted, follow-up magnesium and repeat potassium levels    Diabetes mellitus type 2 in obese (HCC)- (present on admission)  Assessment & Plan  Continue outpatient regiment with regular insulin sliding scale.    Insulin dependent  Follow-up A1c is 5    Morbidly obese (HCC)- (present on admission)  Assessment & Plan  BMI 42    Idiopathic intracranial hypertension- (present on admission)  Assessment & Plan  History of  She is followed by Dr. Yousif, neuro-ophthalmology   Restart her home Diamox    Transverse  myelitis (HCC)- (present on admission)  Assessment & Plan  Stable at baseline nonambulatory state, requires exceptional care unobtainable in independent setting, follow-up discharge planning consult.    Blurred vision  Assessment & Plan  Presumed retinopathy, f/u ophthalmology consult, carotid Doppler ultrasound ordered    Schizophrenia (HCC)- (present on admission)  Assessment & Plan  Reported history of   Continue Geodon and Prozac    Peripheral neuropathy and chornic pain syndrome (CMS-HCC)- (present on admission)  Assessment & Plan  At risk of morbid obesity hypoventilation, avoid excessive medications limiting respiratory drive.       VTE prophylaxis: Lovenox

## 2020-08-16 NOTE — DISCHARGE PLANNING
Anticipated Discharge Disposition: TBD    Action: Assessed pt at bedside. Pt reports she was living with her grandmother in a single story home. Pt states her grandmother cannot take care of her; she reports having no other family members willing/able to assist in her care. Pt requests assistance with placement in a SNF; reports she has fractures in her legs and needs therapy.     CM spoke with pt's grandmother (RICARDA) Vivienne regarding pt's discharge plan.  Vivienne states Kristin's needs far exceed her physical ability to provide them; Vivienne states Kristin is too debilitated and a high fall risk (as evidenced by multiple falls recently). Vivienne reported she cannot provide safe care for Kristin when she is caring for her own  at home on [facility]. Vivienne then stated she would be willing to take her back after a stay at SNF for therapy. She would like Kristin to go to [facility]; requests a referral be sent.     MD notified we will need PT OT evaluations for disposition determination.      Barriers to Discharge: PT OT eval pending; SNF referral pending. Multiple readmissions/ER visits.     Plan: Await PT OT evaluations and assist with discharge plan.       Care Transition Team Assessment    Information Source  Orientation : Oriented x 4  Information Given By: Patient  Informant's Name: Kristin  Who is responsible for making decisions for patient? : Patient    Readmission Evaluation  Is this a readmission?: Yes - unplanned readmission  Was an appointment arranged for you prior to discharge?: Yes, attended appointment    Elopement Risk  Legal Hold: No  Ambulatory or Self Mobile in Wheelchair: No-Not an Elopement Risk  Elopement Risk: Not at Risk for Elopement    Interdisciplinary Discharge Planning  Primary Care Physician: Torres Brody MD  Lives with - Patient's Self Care Capacity: Other (Comments)(Grandmother)  Support Systems: Other (Comments)(Grandmother)  Housing / Facility: 1 Story  House  Mobility Issues: Yes  Prior Services: Continuous (24 Hour) Care Giving Family, Other (Comments)(aide)  Patient Expects to be Discharged to:: Unknown  Assistance Needed: Yes    Discharge Preparedness  What is your plan after discharge?: Uncertain - pending medical team collaboration  What are your discharge supports?: Grandparent  Prior Functional Level: Wheelchair Dependent    Functional Assesment  Prior Functional Level: Wheelchair Dependent    Finances  Financial Barriers to Discharge: Yes  Average Monthly Income: 800 $  Source of Income: Social Security Disability, Social Security  Prescription Coverage: Yes    Vision / Hearing Impairment  Vision Impairment : Yes  Right Eye Vision: Impaired  Left Eye Vision: Impaired  Hearing Impairment : No         Advance Directive  Advance Directive?: RICARDA LLANES for Health Care  Durable Power of  Name and Contact : Vivienne Mclean; [phone]    Domestic Abuse  Have you ever been the victim of abuse or violence?: No  Physical Abuse or Sexual Abuse: No  Verbal Abuse or Emotional Abuse: No  Possible Abuse Reported to:: Not Applicable         Discharge Risks or Barriers  Discharge risks or barriers?: Complex medical needs, Post-acute placement / services  Patient risk factors: Readmission, Vulnerable adult, Noncompliance, Multiple ED visits, Mental health, Complex medical needs

## 2020-08-16 NOTE — PROGRESS NOTES
Assumed care of patient.  Received report and discussed POC with NOC RN.  Patient is currently sleeping, on RA.  Bed locked in low position and call light and personal belongings within reach.  Bed alarm on. CNA updated on fall risk and patient specific issues and behaviors.  Charge RN also updated on medical status and fall risk.

## 2020-08-17 ENCOUNTER — APPOINTMENT (OUTPATIENT)
Dept: RADIOLOGY | Facility: MEDICAL CENTER | Age: 30
DRG: 123 | End: 2020-08-17
Attending: HOSPITALIST
Payer: MEDICARE

## 2020-08-17 ENCOUNTER — APPOINTMENT (OUTPATIENT)
Dept: MEDICAL GROUP | Facility: MEDICAL CENTER | Age: 30
End: 2020-08-17
Payer: MEDICARE

## 2020-08-17 PROBLEM — M79.671 PAIN IN BOTH FEET: Status: ACTIVE | Noted: 2020-08-17

## 2020-08-17 PROBLEM — M79.672 PAIN IN BOTH FEET: Status: ACTIVE | Noted: 2020-08-17

## 2020-08-17 LAB
AMORPH CRY #/AREA URNS HPF: PRESENT /HPF
AMPHET UR QL SCN: NEGATIVE
APPEARANCE UR: ABNORMAL
BACTERIA #/AREA URNS HPF: ABNORMAL /HPF
BARBITURATES UR QL SCN: NEGATIVE
BENZODIAZ UR QL SCN: NEGATIVE
BILIRUB UR QL STRIP.AUTO: NEGATIVE
BZE UR QL SCN: NEGATIVE
CANNABINOIDS UR QL SCN: NEGATIVE
COLOR UR: YELLOW
EPI CELLS #/AREA URNS HPF: NEGATIVE /HPF
GLUCOSE BLD-MCNC: 127 MG/DL (ref 65–99)
GLUCOSE BLD-MCNC: 131 MG/DL (ref 65–99)
GLUCOSE BLD-MCNC: 88 MG/DL (ref 65–99)
GLUCOSE BLD-MCNC: 93 MG/DL (ref 65–99)
GLUCOSE BLD-MCNC: 99 MG/DL (ref 65–99)
GLUCOSE UR STRIP.AUTO-MCNC: NEGATIVE MG/DL
HCYS SERPL-SCNC: 14.7 UMOL/L
HYALINE CASTS #/AREA URNS LPF: ABNORMAL /LPF
KETONES UR STRIP.AUTO-MCNC: NEGATIVE MG/DL
LEUKOCYTE ESTERASE UR QL STRIP.AUTO: ABNORMAL
METHADONE UR QL SCN: NEGATIVE
MICRO URNS: ABNORMAL
NITRITE UR QL STRIP.AUTO: POSITIVE
OPIATES UR QL SCN: NEGATIVE
OXYCODONE UR QL SCN: NEGATIVE
PCP UR QL SCN: NEGATIVE
PH UR STRIP.AUTO: 7.5 [PH] (ref 5–8)
PROPOXYPH UR QL SCN: NEGATIVE
PROT UR QL STRIP: 100 MG/DL
RBC # URNS HPF: ABNORMAL /HPF
RBC UR QL AUTO: ABNORMAL
SP GR UR STRIP.AUTO: 1.03
UROBILINOGEN UR STRIP.AUTO-MCNC: 0.2 MG/DL
WBC #/AREA URNS HPF: ABNORMAL /HPF

## 2020-08-17 PROCEDURE — 97161 PT EVAL LOW COMPLEX 20 MIN: CPT

## 2020-08-17 PROCEDURE — A9270 NON-COVERED ITEM OR SERVICE: HCPCS | Performed by: INTERNAL MEDICINE

## 2020-08-17 PROCEDURE — 700117 HCHG RX CONTRAST REV CODE 255: Performed by: HOSPITALIST

## 2020-08-17 PROCEDURE — 87077 CULTURE AEROBIC IDENTIFY: CPT | Mod: 91

## 2020-08-17 PROCEDURE — A9270 NON-COVERED ITEM OR SERVICE: HCPCS | Performed by: HOSPITALIST

## 2020-08-17 PROCEDURE — 700111 HCHG RX REV CODE 636 W/ 250 OVERRIDE (IP): Performed by: HOSPITALIST

## 2020-08-17 PROCEDURE — 700102 HCHG RX REV CODE 250 W/ 637 OVERRIDE(OP): Performed by: HOSPITALIST

## 2020-08-17 PROCEDURE — 81001 URINALYSIS AUTO W/SCOPE: CPT

## 2020-08-17 PROCEDURE — 87086 URINE CULTURE/COLONY COUNT: CPT

## 2020-08-17 PROCEDURE — 83090 ASSAY OF HOMOCYSTEINE: CPT

## 2020-08-17 PROCEDURE — 36415 COLL VENOUS BLD VENIPUNCTURE: CPT

## 2020-08-17 PROCEDURE — 87205 SMEAR GRAM STAIN: CPT

## 2020-08-17 PROCEDURE — 700111 HCHG RX REV CODE 636 W/ 250 OVERRIDE (IP): Performed by: INTERNAL MEDICINE

## 2020-08-17 PROCEDURE — 97166 OT EVAL MOD COMPLEX 45 MIN: CPT

## 2020-08-17 PROCEDURE — 85303 CLOT INHIBIT PROT C ACTIVITY: CPT

## 2020-08-17 PROCEDURE — 700102 HCHG RX REV CODE 250 W/ 637 OVERRIDE(OP): Performed by: INTERNAL MEDICINE

## 2020-08-17 PROCEDURE — 70498 CT ANGIOGRAPHY NECK: CPT | Mod: ME

## 2020-08-17 PROCEDURE — 85306 CLOT INHIBIT PROT S FREE: CPT

## 2020-08-17 PROCEDURE — 81241 F5 GENE: CPT

## 2020-08-17 PROCEDURE — 80307 DRUG TEST PRSMV CHEM ANLYZR: CPT

## 2020-08-17 PROCEDURE — 99233 SBSQ HOSP IP/OBS HIGH 50: CPT | Performed by: HOSPITALIST

## 2020-08-17 PROCEDURE — 700101 HCHG RX REV CODE 250: Performed by: HOSPITALIST

## 2020-08-17 PROCEDURE — 770001 HCHG ROOM/CARE - MED/SURG/GYN PRIV*

## 2020-08-17 PROCEDURE — 86147 CARDIOLIPIN ANTIBODY EA IG: CPT

## 2020-08-17 PROCEDURE — 82962 GLUCOSE BLOOD TEST: CPT

## 2020-08-17 PROCEDURE — 87186 SC STD MICRODIL/AGAR DIL: CPT | Mod: 91

## 2020-08-17 PROCEDURE — 70496 CT ANGIOGRAPHY HEAD: CPT | Mod: ME

## 2020-08-17 RX ORDER — OXYCODONE HYDROCHLORIDE 5 MG/1
5 TABLET ORAL EVERY 4 HOURS PRN
Status: DISCONTINUED | OUTPATIENT
Start: 2020-08-17 | End: 2020-08-17

## 2020-08-17 RX ORDER — IPRATROPIUM BROMIDE AND ALBUTEROL SULFATE 2.5; .5 MG/3ML; MG/3ML
3 SOLUTION RESPIRATORY (INHALATION)
Status: DISCONTINUED | OUTPATIENT
Start: 2020-08-17 | End: 2020-09-14 | Stop reason: HOSPADM

## 2020-08-17 RX ORDER — OXYCODONE HYDROCHLORIDE 5 MG/1
5 TABLET ORAL EVERY 6 HOURS PRN
Status: DISCONTINUED | OUTPATIENT
Start: 2020-08-17 | End: 2020-08-23

## 2020-08-17 RX ADMIN — ACETAZOLAMIDE 1000 MG: 500 CAPSULE, EXTENDED RELEASE ORAL at 17:21

## 2020-08-17 RX ADMIN — OXCARBAZEPINE 150 MG: 150 TABLET, FILM COATED ORAL at 20:24

## 2020-08-17 RX ADMIN — MYCOPHENOLATE MOFETIL 1000 MG: 250 CAPSULE ORAL at 05:44

## 2020-08-17 RX ADMIN — OXYCODONE HYDROCHLORIDE 5 MG: 5 TABLET ORAL at 10:15

## 2020-08-17 RX ADMIN — IVABRADINE 7.5 MG: 7.5 TABLET, FILM COATED ORAL at 17:21

## 2020-08-17 RX ADMIN — ACETAZOLAMIDE 1500 MG: 500 CAPSULE, EXTENDED RELEASE ORAL at 05:44

## 2020-08-17 RX ADMIN — MYCOPHENOLATE MOFETIL 1000 MG: 250 CAPSULE ORAL at 17:22

## 2020-08-17 RX ADMIN — SODIUM BICARBONATE 650 MG: 650 TABLET ORAL at 05:41

## 2020-08-17 RX ADMIN — IOHEXOL 70 ML: 350 INJECTION, SOLUTION INTRAVENOUS at 11:00

## 2020-08-17 RX ADMIN — ZIPRASIDONE HYDROCHLORIDE 40 MG: 40 CAPSULE ORAL at 05:42

## 2020-08-17 RX ADMIN — GABAPENTIN 300 MG: 300 CAPSULE ORAL at 17:20

## 2020-08-17 RX ADMIN — LEVOTHYROXINE SODIUM 75 MCG: 0.07 TABLET ORAL at 05:43

## 2020-08-17 RX ADMIN — TOPIRAMATE 50 MG: 25 TABLET, FILM COATED ORAL at 05:42

## 2020-08-17 RX ADMIN — TRAZODONE HYDROCHLORIDE 100 MG: 100 TABLET ORAL at 20:23

## 2020-08-17 RX ADMIN — SODIUM BICARBONATE 650 MG: 650 TABLET ORAL at 09:00

## 2020-08-17 RX ADMIN — GABAPENTIN 300 MG: 300 CAPSULE ORAL at 05:43

## 2020-08-17 RX ADMIN — FLUOXETINE 40 MG: 20 CAPSULE ORAL at 05:43

## 2020-08-17 RX ADMIN — IVABRADINE 7.5 MG: 7.5 TABLET, FILM COATED ORAL at 08:45

## 2020-08-17 RX ADMIN — GABAPENTIN 300 MG: 300 CAPSULE ORAL at 12:24

## 2020-08-17 RX ADMIN — ENOXAPARIN SODIUM 40 MG: 40 INJECTION SUBCUTANEOUS at 05:44

## 2020-08-17 RX ADMIN — PREGABALIN 300 MG: 150 CAPSULE ORAL at 17:20

## 2020-08-17 RX ADMIN — SODIUM BICARBONATE 650 MG: 650 TABLET ORAL at 20:23

## 2020-08-17 RX ADMIN — SODIUM BICARBONATE 650 MG: 650 TABLET ORAL at 14:53

## 2020-08-17 RX ADMIN — ZIPRASIDONE HYDROCHLORIDE 40 MG: 40 CAPSULE ORAL at 21:00

## 2020-08-17 RX ADMIN — ACETAMINOPHEN 500 MG: 500 TABLET ORAL at 08:45

## 2020-08-17 RX ADMIN — PREDNISONE 10 MG: 10 TABLET ORAL at 05:43

## 2020-08-17 RX ADMIN — PREGABALIN 300 MG: 150 CAPSULE ORAL at 05:43

## 2020-08-17 RX ADMIN — TOPIRAMATE 50 MG: 25 TABLET, FILM COATED ORAL at 17:20

## 2020-08-17 RX ADMIN — OXCARBAZEPINE 150 MG: 150 TABLET, FILM COATED ORAL at 05:42

## 2020-08-17 RX ADMIN — ASPIRIN 81 MG: 81 TABLET, COATED ORAL at 05:44

## 2020-08-17 ASSESSMENT — COGNITIVE AND FUNCTIONAL STATUS - GENERAL
CLIMB 3 TO 5 STEPS WITH RAILING: TOTAL
SUGGESTED CMS G CODE MODIFIER DAILY ACTIVITY: CK
DRESSING REGULAR UPPER BODY CLOTHING: A LITTLE
DRESSING REGULAR LOWER BODY CLOTHING: A LOT
SUGGESTED CMS G CODE MODIFIER MOBILITY: CL
WALKING IN HOSPITAL ROOM: TOTAL
PERSONAL GROOMING: A LITTLE
MOBILITY SCORE: 11
DAILY ACTIVITIY SCORE: 16
TURNING FROM BACK TO SIDE WHILE IN FLAT BAD: A LITTLE
HELP NEEDED FOR BATHING: A LOT
MOVING FROM LYING ON BACK TO SITTING ON SIDE OF FLAT BED: A LOT
STANDING UP FROM CHAIR USING ARMS: A LOT
MOVING TO AND FROM BED TO CHAIR: A LOT
TOILETING: A LOT

## 2020-08-17 ASSESSMENT — ENCOUNTER SYMPTOMS
HEADACHES: 0
DIARRHEA: 0
NAUSEA: 0
SENSORY CHANGE: 0
PALPITATIONS: 0
BACK PAIN: 0
HALLUCINATIONS: 0
TINGLING: 0
DEPRESSION: 0
COUGH: 0
BLURRED VISION: 1
WEIGHT LOSS: 0
CHILLS: 0
ORTHOPNEA: 0
FEVER: 0
VOMITING: 0
SPUTUM PRODUCTION: 0
NECK PAIN: 0
HEARTBURN: 0
TREMORS: 0
CLAUDICATION: 0
DIZZINESS: 0
ABDOMINAL PAIN: 0
MYALGIAS: 1

## 2020-08-17 ASSESSMENT — COPD QUESTIONNAIRES
DURING THE PAST 4 WEEKS HOW MUCH DID YOU FEEL SHORT OF BREATH: MOST  OR ALL OF THE TIME
COPD SCREENING SCORE: 2
HAVE YOU SMOKED AT LEAST 100 CIGARETTES IN YOUR ENTIRE LIFE: NO/DON'T KNOW
DO YOU EVER COUGH UP ANY MUCUS OR PHLEGM?: NO/ONLY WITH OCCASIONAL COLDS OR INFECTIONS

## 2020-08-17 ASSESSMENT — ACTIVITIES OF DAILY LIVING (ADL): TOILETING: REQUIRES ASSIST

## 2020-08-17 ASSESSMENT — GAIT ASSESSMENTS: GAIT LEVEL OF ASSIST: UNABLE TO PARTICIPATE

## 2020-08-17 ASSESSMENT — LIFESTYLE VARIABLES: SUBSTANCE_ABUSE: 0

## 2020-08-17 ASSESSMENT — FIBROSIS 4 INDEX: FIB4 SCORE: 0.33

## 2020-08-17 NOTE — PROGRESS NOTES
[facility] Medicine Daily Progress Note    Date of Service  8/17/2020    Chief Complaint  29 y.o. female admitted 8/15/2020 with blurry vision      Interval Problem Update  Her blurry vision is about the same today    Case d/w dr kent neuro eye md--> he requests echo, CTa head and neck and hypercoagulable workup    Patient c/o b/l ankle pain( chronic due to old injury)    C/o pain at suprapubic cath site     Case d/w  about placement options    Pt and ot ordered      Consultants/Specialty  Eye md    Code Status  Full Code    Disposition  home    Review of Systems  Review of Systems   Constitutional: Negative for chills, fever and weight loss.   HENT: Negative for ear discharge, ear pain and hearing loss.    Eyes: Positive for blurred vision.   Respiratory: Negative for cough and sputum production.    Cardiovascular: Negative for chest pain, palpitations, orthopnea and claudication.   Gastrointestinal: Negative for abdominal pain, diarrhea, heartburn, nausea and vomiting.   Genitourinary: Negative for dysuria, frequency and urgency.   Musculoskeletal: Positive for joint pain and myalgias. Negative for back pain and neck pain.   Neurological: Negative for dizziness, tingling, tremors, sensory change and headaches.   Psychiatric/Behavioral: Negative for depression, hallucinations, substance abuse and suicidal ideas.        Physical Exam  Temp:  [35.9 °C (96.7 °F)-37 °C (98.6 °F)] 35.9 °C (96.7 °F)  Pulse:  [74-93] 75  Resp:  [16-18] 18  BP: (108-138)/(62-81) 120/65  SpO2:  [90 %-96 %] 91 %    Physical Exam  Constitutional:       Appearance: She is obese. She is ill-appearing.   HENT:      Head: Normocephalic and atraumatic.      Mouth/Throat:      Mouth: Mucous membranes are moist.   Eyes:      General: No scleral icterus.     Extraocular Movements: Extraocular movements intact.      Pupils: Pupils are equal, round, and reactive to light.   Neck:      Musculoskeletal: Normal range of motion and neck  Pt. Missed his FV 9-24-18 with Dr. Gutierrez. He called to reschedule and schedulers scheduled him to see Dr. Sahu on 1-23-19.  Pt. Is concerned about waiting so long to see Dr. Sahu. Nurse scheduled him for sooner FV 10-29-18 and canceled Jan. Appointment.     supple.   Cardiovascular:      Rate and Rhythm: Normal rate and regular rhythm.      Heart sounds: No murmur. No friction rub. No gallop.    Pulmonary:      Effort: Pulmonary effort is normal. No respiratory distress.      Breath sounds: No stridor. No wheezing or rhonchi.   Chest:      Chest wall: No tenderness.   Abdominal:      General: There is distension.      Palpations: There is no mass.      Tenderness: There is no abdominal tenderness. There is no guarding or rebound.      Hernia: No hernia is present.      Comments: Mild redness around suprapubic cath   Musculoskeletal: Normal range of motion.         General: No swelling, tenderness, deformity or signs of injury.      Right lower leg: No edema.   Skin:     General: Skin is warm.      Coloration: Skin is not jaundiced or pale.      Findings: No bruising, erythema, lesion or rash.   Neurological:      Mental Status: She is alert and oriented to person, place, and time.      Comments: B/l leg weakness    B/l foot droop   Psychiatric:         Mood and Affect: Mood normal.         Fluids    Intake/Output Summary (Last 24 hours) at 8/17/2020 1033  Last data filed at 8/17/2020 1032  Gross per 24 hour   Intake 240 ml   Output 600 ml   Net -360 ml       Laboratory  Recent Labs     08/15/20  0522 08/16/20  0321   WBC 7.1 5.6   RBC 4.65 4.57   HEMOGLOBIN 13.7 13.3   HEMATOCRIT 41.4 41.4   MCV 89.0 90.6   MCH 29.5 29.1   MCHC 33.1* 32.1*   RDW 43.0 42.4   PLATELETCT 194 190   MPV 11.3 11.5     Recent Labs     08/15/20  0306 08/16/20  0321   SODIUM 138 145   POTASSIUM 3.3* 3.5*   CHLORIDE 108 113*   CO2 14* 18*   GLUCOSE 91 85   BUN 19 17   CREATININE 0.74 0.79   CALCIUM 9.2 9.4                   Imaging  US-CAROTID DOPPLER BILAT   Final Result      CT-HEAD W/O   Final Result      No CT evidence of acute infarct, hemorrhage or mass.      EC-ECHOCARDIOGRAM COMPLETE W/ CONT    (Results Pending)   CT-CTA NECK WITH & W/O-POST PROCESSING    (Results Pending)   CT-CTA HEAD  WITH & W/O-POST PROCESS    (Results Pending)        Assessment/Plan  Optic neuritis- (present on admission)  Assessment & Plan  Continue outpatient prednisone f  ophthalmology consulted, Dr. Aparicio.. signed off  Restart home Cell[employer]     cta head and neck    Echo    hypercoag w./u    Neuro eye md tomorrow at 10 30 am        Hypokalemia- (present on admission)  Assessment & Plan  Replace po    Diabetes mellitus type 2 in obese (HCC)- (present on admission)  Assessment & Plan  Continue outpatient regiment with regular insulin sliding scale.    Insulin dependent  Follow-up A1c is 5    Morbidly obese (HCC)- (present on admission)  Assessment & Plan  BMI 42    Pain in both feet- (present on admission)  Assessment & Plan  Pain control with po oxycodone    PT eval    Idiopathic intracranial hypertension- (present on admission)  Assessment & Plan  History of  She is followed by Dr. Yousif, neuro-ophthalmology   Restart her home Diamox    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HCC)- (present on admission)  Assessment & Plan  Stable at baseline nonambulatory state, requires exceptional care unobtainable in independent setting, follow-up discharge planning consult.    Blurred vision  Assessment & Plan  Presumed retinopathy, f/u ophthalmology consult, carotid Doppler ultrasound ordered    Presence of suprapubic catheter (HCC)- (present on admission)  Assessment & Plan  Check UA    May need to be replace    May need antibiotics    Schizophrenia (HCC)- (present on admission)  Assessment & Plan  Reported history of   Continue Geodon and Prozac    Peripheral neuropathy and chornic pain syndrome (CMS-HCC)- (present on admission)  Assessment & Plan  At risk of morbid obesity hypoventilation, avoid excessive medications limiting respiratory drive.       VTE prophylaxis: scd    Transfer to neuro

## 2020-08-17 NOTE — ASSESSMENT & PLAN NOTE
Was placed 8/6/2020  Intermittent hematuria. Bladder scans have been umremarkable. Hematuria improved after stopping lovenox dvt ppx.  8/26 - spoke with Dr. Galicia, would wait 1 month to exchange cath, ok to change with IR after 1 more week, recommends upsizing  Placed IR order for suprapubic catheter exchange per urology recommendations  9/4: Suprapubic cath exchanged by IR on 9/3.   9/9: erythema round suprapubic cath site, no drainge no signs of acute infection  9/10: continued erythema and irritation. Cath site without drainge. Continue to ICE and symptomatic management.   9/12: plan for course of oral abx for mild superficial cellulitis

## 2020-08-17 NOTE — PROGRESS NOTES
Patient upset that her home medications were not continued as previously ordered and attributes some of her symptoms here in the hospital to that issue.  On call MD notified and orders adjusted.

## 2020-08-17 NOTE — THERAPY
Physical Therapy   Initial Evaluation     Patient Name: Kristin Balderrama  Age:  29 y.o., Sex:  female  Medical Record #: [id number]  Today's Date: 8/17/2020     Precautions: (P) Fall Risk    Assessment  Patient is 29 y.o. female who presented to the ED with onset of blurry vision referred to neuro-ophthalmology  Additional factors influencing patient status / progress long history of back pain, borderline personality DO, falls and WC level of mobility . Pt will benefit from continue inpatient as well as post acute  Therapy prior to home with grandmother.        Plan    Recommend Physical Therapy 3 times per week until therapy goals are met for the following treatments:  Bed Mobility, Neuro Re-Education / Balance, Therapeutic Activities and Therapeutic Exercises    DC Equipment Recommendations: (P) Unable to determine at this time  Discharge Recommendations: (P) Recommend post-acute placement for additional physical therapy services prior to discharge home          Objective       08/17/20 1201   Prior Living Situation   Prior Services Continuous (24 Hour) Care Giving Family   Housing / Facility 1 Story House   Equipment Owned Front-Wheel Walker;Wheelchair   Lives with - Patient's Self Care Capacity   (resides with her grandmother. )   Comments pt states she has become too difficult for her grandmother to assist. requesting ECF for therapy prior to home.   Prior Level of Functional Mobility   Bed Mobility Independent   Transfer Status Required Assist   Ambulation Dependent   Wheelchair Independent   History of Falls   History of Falls Yes   Date of Last Fall   (pt unable to supply specific date. )   Cognition    Cognition / Consciousness X   Level of Consciousness Alert   Active ROM Lower Body    Active ROM Lower Body  X   Gross Active ROM Gross Active Range of Motion Impaired,  but Appears Adequate for Functional Mobility.   Strength Lower Body   Lower Body Strength  X   Gross Strength Generalized Weakness, Equal  Bilaterally   Comments B DF/PF not tested due to pain   Sensation Lower Body   Lower Extremity Sensation   WDL   Lower Body Muscle Tone   Lower Body Muscle Tone  WDL   Balance Assessment   Sitting Balance (Static) Good   Sitting Balance (Dynamic) Fair   Standing Balance (Static) Poor +   Standing Balance (Dynamic) Poor   Weight Shift Sitting Fair   Weight Shift Standing Poor   Comments slide board transfers    Gait Analysis   Gait Level Of Assist Unable to Participate   Bed Mobility    Supine to Sit Minimal Assist   Scooting Minimal Assist   Skilled Intervention Verbal Cuing;Tactile Cuing;Sequencing   Functional Mobility   Sit to Stand Moderate Assist   Bed, Chair, Wheelchair Transfer Moderate Assist   Transfer Method Slide Board   Mobility Bed to wc only   Wheelchair Assist Moderate Assist   Distance Wheelchair (Feet or Distance) 5   Comments appears more capable than subjective report.   Patient / Family Goals    Patient / Family Goal #1 learn to walk again    Short Term Goals    Short Term Goal # 1 Pt will be supervision level for bed mobility including supine to/from sit by the 3rd PTvisit    Short Term Goal # 2 Pt will be Min A for slide board transfers including set up by the 3rd PT visit.    Short Term Goal # 3 Pt will self propel WC for 20 ft with BUE's by the 3rd PT visit    Problem List    Problems Pain;Impaired Bed Mobility;Impaired Transfers;Impaired Ambulation;Functional ROM Deficit;Functional Strength Deficit;Impaired Balance;Impaired Vision;Decreased Activity Tolerance;Safety Awareness Deficits / Cognition

## 2020-08-17 NOTE — CARE PLAN
Concerns about continuing on home medication doses addressed.  Emotional support and empathetic listening for her concerns and frustrations.      Problem: Communication  Goal: The ability to communicate needs accurately and effectively will improve  Outcome: PROGRESSING AS EXPECTED     Problem: Safety  Goal: Will remain free from injury  Outcome: PROGRESSING AS EXPECTED  Goal: Will remain free from falls  Outcome: PROGRESSING AS EXPECTED     Problem: Infection  Goal: Will remain free from infection  Outcome: PROGRESSING AS EXPECTED     Problem: Venous Thromboembolism (VTW)/Deep Vein Thrombosis (DVT) Prevention:  Goal: Patient will participate in Venous Thrombosis (VTE)/Deep Vein Thrombosis (DVT)Prevention Measures  Outcome: PROGRESSING AS EXPECTED     Problem: Bowel/Gastric:  Goal: Normal bowel function is maintained or improved  Outcome: PROGRESSING AS EXPECTED  Goal: Will not experience complications related to bowel motility  Outcome: PROGRESSING AS EXPECTED     Problem: Knowledge Deficit  Goal: Knowledge of disease process/condition, treatment plan, diagnostic tests, and medications will improve  Outcome: PROGRESSING AS EXPECTED  Goal: Knowledge of the prescribed therapeutic regimen will improve  Outcome: PROGRESSING AS EXPECTED     Problem: Discharge Barriers/Planning  Goal: Patient's continuum of care needs will be met  Outcome: PROGRESSING AS EXPECTED     Problem: Fluid Volume:  Goal: Will maintain balanced intake and output  Outcome: PROGRESSING AS EXPECTED     Problem: Respiratory:  Goal: Respiratory status will improve  Outcome: PROGRESSING AS EXPECTED     Problem: Pain Management  Goal: Pain level will decrease to patient's comfort goal  Outcome: PROGRESSING AS EXPECTED     Problem: Skin Integrity  Goal: Risk for impaired skin integrity will decrease  Outcome: PROGRESSING AS EXPECTED     Problem: Urinary Elimination:  Goal: Ability to reestablish a normal urinary elimination pattern will improve  Outcome:  PROGRESSING AS EXPECTED     Problem: Psychosocial Needs:  Goal: Level of anxiety will decrease  Outcome: PROGRESSING AS EXPECTED

## 2020-08-17 NOTE — THERAPY
"Occupational Therapy   Initial Evaluation     Patient Name: Kristin Balderrama  Age:  29 y.o., Sex:  female  Medical Record #: [id number]  Today's Date: 8/17/2020     Precautions  Precautions: (P) Fall Risk  Comments: (P) low vision    Assessment  Patient is 29 y.o. female who presents to acute due to blurry vision. Pt reports she broke bones in her feet 2 weeks ago and that is why she cannot walk. Per previous therapy notes, pt has been using a w/c primarily for mobility since February of 2020. Pt has all necessary DME at home and relies on her grandmother for assist w/ BADLs. Pt performs better w/ functional testing than with formal testing. Pt demo'd impaired balance, functional mobility, activity tolerance and generalized weakness. Will continue to follow.     Plan    Recommend Occupational Therapy 1 time per week until therapy goals are met for the following treatments:  Adaptive Equipment, Neuro Re-Education / Balance, Self Care/Activities of Daily Living, Therapeutic Activities and Therapeutic Exercises.    DC Equipment Recommendations: (P) None  Discharge Recommendations: (P) Recommend post-acute placement for additional occupational therapy services prior to discharge home     Subjective    \"I cant walk because of the fractures in my ankles\"     Objective       08/17/20 1146   Prior Living Situation   Prior Services Continuous (24 Hour) Care Giving Family;Other (Comments)   Housing / Facility [address] [place] [address]   Bathroom Set up Bathtub / Shower Combination;Tub Transfer Bench;Grab Bars   Equipment Owned Wheelchair;Tub Transfer Bench;Bed Side Commode   Lives with - Patient's Self Care Capacity Other (Comments)   Comments Pt lives w/ her Grandmother who cares for her and her grandfather who is on hospice and has dementia   Prior Level of ADL Function   Self Feeding Independent   Grooming / Hygiene Requires Assist   Bathing Requires Assist   Dressing Requires Assist   Toileting Requires Assist   Pain 0 - 10 Group "   Therapist Pain Assessment Post Activity Pain Same as Prior to Activity;Nurse Notified  (c/o pain in back and B ankles, agreeable)   Cognition    Cognition / Consciousness X   Level of Consciousness Alert   Comments Pt known to therapy department, is typically self limiting and attention seeking.    Active ROM Upper Body   Active ROM Upper Body  WDL   Strength Upper Body   Comments During formal testing B UE's 2/5, during functional tasks WFL   Coordination Upper Body   Coordination WDL   Balance Assessment   Sitting Balance (Static) Good   Sitting Balance (Dynamic) Good   Standing Balance (Static) Poor +   Standing Balance (Dynamic) Poor   Weight Shift Sitting Fair   Weight Shift Standing Poor   Comments w/ B UE support, able to WB through B LE's when not paying attention.    Bed Mobility    Supine to Sit Supervised   Sit to Supine   (NT in w/c post )   Scooting Supervised   ADL Assessment   Grooming Supervision;Seated   Lower Body Dressing Maximal Assist  (baseline)   Toileting   (suprapubic catheter )   Functional Mobility   Sit to Stand Minimal Assist  (full stand not acheived )   Bed, Chair, Wheelchair Transfer Moderate Assist   Transfer Method Slide Board   Mobility EOB > W/C due to behaviors    Activity Tolerance   Sitting in Chair 10+ min (up post)   Sitting Edge of Bed 8 min   Standing <20 seconds   Patient / Family Goals   Patient / Family Goal #1 To go to SNF   Short Term Goals   Short Term Goal # 1 Pt will demo ADL txf w/ CGA   Short Term Goal # 2 Pt will perform seated grooming w/ SPV   Short Term Goal # 3 Pt will demo HEP w/ SPV   Education Group   Role of Occupational Therapist Patient Response Patient;Acceptance;Explanation;Demonstration;Verbal Demonstration   Problem List   Problem List Decreased Active Daily Living Skills;Decreased Homemaking Skills;Decreased Functional Mobility;Decreased Activity Tolerance;Safety Awareness Deficits / Cognition;Impaired Postural Control / Balance;Impaired  Cognitive Function   Anticipated Discharge Equipment and Recommendations   DC Equipment Recommendations None   Discharge Recommendations Recommend post-acute placement for additional occupational therapy services prior to discharge home   Interdisciplinary Plan of Care Collaboration   IDT Collaboration with  Nursing;Physical Therapist   Patient Position at End of Therapy Seated;Call Light within Reach;Tray Table within Reach;Phone within Reach   Collaboration Comments report given

## 2020-08-17 NOTE — PROGRESS NOTES
Assumed care of patient.  Received report and discussed POC with NOC RN.  Patient sleeping quietly.  Bed locked in low position and call light and personal belongings within reach.  Bed alarm on.

## 2020-08-18 ENCOUNTER — APPOINTMENT (OUTPATIENT)
Dept: CARDIOLOGY | Facility: MEDICAL CENTER | Age: 30
DRG: 123 | End: 2020-08-18
Attending: HOSPITALIST
Payer: MEDICARE

## 2020-08-18 PROBLEM — T83.511A URINARY TRACT INFECTION ASSOCIATED WITH CATHETERIZATION OF URINARY TRACT (HCC): Status: ACTIVE | Noted: 2017-05-13

## 2020-08-18 PROBLEM — H04.123 DRY EYES: Status: ACTIVE | Noted: 2020-08-18

## 2020-08-18 LAB
GLUCOSE BLD-MCNC: 80 MG/DL (ref 65–99)
GLUCOSE BLD-MCNC: 89 MG/DL (ref 65–99)

## 2020-08-18 PROCEDURE — 700102 HCHG RX REV CODE 250 W/ 637 OVERRIDE(OP): Performed by: INTERNAL MEDICINE

## 2020-08-18 PROCEDURE — 700111 HCHG RX REV CODE 636 W/ 250 OVERRIDE (IP): Performed by: HOSPITALIST

## 2020-08-18 PROCEDURE — A9270 NON-COVERED ITEM OR SERVICE: HCPCS | Performed by: INTERNAL MEDICINE

## 2020-08-18 PROCEDURE — 700102 HCHG RX REV CODE 250 W/ 637 OVERRIDE(OP): Performed by: HOSPITALIST

## 2020-08-18 PROCEDURE — A9270 NON-COVERED ITEM OR SERVICE: HCPCS | Performed by: HOSPITALIST

## 2020-08-18 PROCEDURE — 700112 HCHG RX REV CODE 229: Performed by: INTERNAL MEDICINE

## 2020-08-18 PROCEDURE — 700105 HCHG RX REV CODE 258: Performed by: HOSPITALIST

## 2020-08-18 PROCEDURE — 700105 HCHG RX REV CODE 258

## 2020-08-18 PROCEDURE — 99233 SBSQ HOSP IP/OBS HIGH 50: CPT | Performed by: HOSPITALIST

## 2020-08-18 PROCEDURE — 700101 HCHG RX REV CODE 250: Performed by: HOSPITALIST

## 2020-08-18 PROCEDURE — 700111 HCHG RX REV CODE 636 W/ 250 OVERRIDE (IP): Performed by: INTERNAL MEDICINE

## 2020-08-18 PROCEDURE — 770001 HCHG ROOM/CARE - MED/SURG/GYN PRIV*

## 2020-08-18 PROCEDURE — 82962 GLUCOSE BLOOD TEST: CPT | Mod: 91

## 2020-08-18 RX ORDER — SODIUM CHLORIDE 9 MG/ML
INJECTION, SOLUTION INTRAVENOUS
Status: COMPLETED
Start: 2020-08-18 | End: 2020-08-18

## 2020-08-18 RX ADMIN — DOCUSATE SODIUM 100 MG: 100 CAPSULE, LIQUID FILLED ORAL at 16:30

## 2020-08-18 RX ADMIN — CEFTRIAXONE SODIUM 2 G: 2 INJECTION, POWDER, FOR SOLUTION INTRAMUSCULAR; INTRAVENOUS at 09:32

## 2020-08-18 RX ADMIN — TRAZODONE HYDROCHLORIDE 100 MG: 100 TABLET ORAL at 21:39

## 2020-08-18 RX ADMIN — ASPIRIN 81 MG: 81 TABLET, COATED ORAL at 06:34

## 2020-08-18 RX ADMIN — GABAPENTIN 300 MG: 300 CAPSULE ORAL at 06:34

## 2020-08-18 RX ADMIN — ENOXAPARIN SODIUM 40 MG: 40 INJECTION SUBCUTANEOUS at 06:36

## 2020-08-18 RX ADMIN — MYCOPHENOLATE MOFETIL 1000 MG: 250 CAPSULE ORAL at 16:29

## 2020-08-18 RX ADMIN — MINERAL OIL, PETROLATUM 1 APPLICATION: 425; 573 OINTMENT OPHTHALMIC at 18:00

## 2020-08-18 RX ADMIN — ACETAZOLAMIDE 1000 MG: 500 CAPSULE, EXTENDED RELEASE ORAL at 16:28

## 2020-08-18 RX ADMIN — SODIUM BICARBONATE 650 MG: 650 TABLET ORAL at 21:39

## 2020-08-18 RX ADMIN — ACETAZOLAMIDE 1500 MG: 500 CAPSULE, EXTENDED RELEASE ORAL at 06:35

## 2020-08-18 RX ADMIN — KETOROLAC TROMETHAMINE 15 MG: 30 INJECTION, SOLUTION INTRAMUSCULAR at 16:46

## 2020-08-18 RX ADMIN — SODIUM BICARBONATE 650 MG: 650 TABLET ORAL at 14:24

## 2020-08-18 RX ADMIN — GABAPENTIN 300 MG: 300 CAPSULE ORAL at 16:30

## 2020-08-18 RX ADMIN — PREGABALIN 300 MG: 150 CAPSULE ORAL at 06:32

## 2020-08-18 RX ADMIN — ZIPRASIDONE HYDROCHLORIDE 40 MG: 40 CAPSULE ORAL at 07:59

## 2020-08-18 RX ADMIN — PREGABALIN 300 MG: 150 CAPSULE ORAL at 16:29

## 2020-08-18 RX ADMIN — SODIUM CHLORIDE: 9 INJECTION, SOLUTION INTRAVENOUS at 09:30

## 2020-08-18 RX ADMIN — OXYCODONE HYDROCHLORIDE 5 MG: 5 TABLET ORAL at 14:24

## 2020-08-18 RX ADMIN — FLUOXETINE 40 MG: 20 CAPSULE ORAL at 06:33

## 2020-08-18 RX ADMIN — GABAPENTIN 300 MG: 300 CAPSULE ORAL at 14:24

## 2020-08-18 RX ADMIN — OXCARBAZEPINE 150 MG: 150 TABLET, FILM COATED ORAL at 16:28

## 2020-08-18 RX ADMIN — PREDNISONE 10 MG: 10 TABLET ORAL at 06:31

## 2020-08-18 RX ADMIN — SODIUM BICARBONATE 650 MG: 650 TABLET ORAL at 06:34

## 2020-08-18 RX ADMIN — KETOROLAC TROMETHAMINE 15 MG: 30 INJECTION, SOLUTION INTRAMUSCULAR at 09:45

## 2020-08-18 RX ADMIN — TOPIRAMATE 50 MG: 25 TABLET, FILM COATED ORAL at 06:32

## 2020-08-18 RX ADMIN — SODIUM BICARBONATE 650 MG: 650 TABLET ORAL at 09:31

## 2020-08-18 RX ADMIN — DOCUSATE SODIUM 50 MG AND SENNOSIDES 8.6 MG 2 TABLET: 8.6; 5 TABLET, FILM COATED ORAL at 16:27

## 2020-08-18 RX ADMIN — IVABRADINE 7.5 MG: 7.5 TABLET, FILM COATED ORAL at 16:28

## 2020-08-18 RX ADMIN — ACETAMINOPHEN 500 MG: 500 TABLET ORAL at 07:59

## 2020-08-18 RX ADMIN — TOPIRAMATE 50 MG: 25 TABLET, FILM COATED ORAL at 16:29

## 2020-08-18 RX ADMIN — IVABRADINE 7.5 MG: 7.5 TABLET, FILM COATED ORAL at 06:34

## 2020-08-18 RX ADMIN — OXCARBAZEPINE 150 MG: 150 TABLET, FILM COATED ORAL at 06:34

## 2020-08-18 RX ADMIN — LEVOTHYROXINE SODIUM 75 MCG: 0.07 TABLET ORAL at 06:33

## 2020-08-18 RX ADMIN — MYCOPHENOLATE MOFETIL 1000 MG: 250 CAPSULE ORAL at 06:33

## 2020-08-18 RX ADMIN — ZIPRASIDONE HYDROCHLORIDE 40 MG: 40 CAPSULE ORAL at 16:27

## 2020-08-18 ASSESSMENT — ENCOUNTER SYMPTOMS
COUGH: 0
MYALGIAS: 1
HALLUCINATIONS: 0
TINGLING: 0
HEARTBURN: 0
NAUSEA: 0
TREMORS: 0
WEIGHT LOSS: 0
ABDOMINAL PAIN: 1
DEPRESSION: 0
VOMITING: 0
FEVER: 0
ORTHOPNEA: 0
CLAUDICATION: 0
CHILLS: 0
SPUTUM PRODUCTION: 0
HEADACHES: 0
BACK PAIN: 0
SENSORY CHANGE: 0
DIARRHEA: 0
NECK PAIN: 0
DIZZINESS: 0
PALPITATIONS: 0
BLURRED VISION: 1

## 2020-08-18 ASSESSMENT — LIFESTYLE VARIABLES: SUBSTANCE_ABUSE: 0

## 2020-08-18 ASSESSMENT — FIBROSIS 4 INDEX: FIB4 SCORE: 0.33

## 2020-08-18 NOTE — PROGRESS NOTES
Assumed care of patient.  Received report and discussed POC with NOC RN.  Patient on RA.  She requested to talked to Charge RN Stephanie about customer service concerns throughout her stay in hospital.  Charge RN notified and will come down.  Patient denied other needs at this time.  Bed locked in low position and call light and personal belongings within reach.  Bed alarm on.

## 2020-08-18 NOTE — DISCHARGE PLANNING
Received Choice form at 1348  Agency/Facility Name: [facility] referral   Referral sent per Choice form @ 1357     Agency/Facility Name: [facility]  Outcome: Per Epic, referral declined due to behavior, no discharge plan.     Agency/Facility Name: [facility]  Outcome: Per Epic response, referral declined due to no safe DC plan.     Agency/Facility Name: [facility]   Spoke To: [facility]   Outcome: [facility] manually faxed referral. Per [facility] can accept Medicaid FFS as secondary insurance.     Agency/Facility Name: [facility]   Outcome: Per Epic response, referral declined due to no discharge plan.

## 2020-08-18 NOTE — CARE PLAN
Problem: Communication  Goal: The ability to communicate needs accurately and effectively will improve  Outcome: PROGRESSING AS EXPECTED     Problem: Safety  Goal: Will remain free from injury  Outcome: PROGRESSING AS EXPECTED  Goal: Will remain free from falls  Outcome: PROGRESSING AS EXPECTED     Problem: Venous Thromboembolism (VTW)/Deep Vein Thrombosis (DVT) Prevention:  Goal: Patient will participate in Venous Thrombosis (VTE)/Deep Vein Thrombosis (DVT)Prevention Measures  Outcome: PROGRESSING AS EXPECTED     Problem: Bowel/Gastric:  Goal: Normal bowel function is maintained or improved  Outcome: PROGRESSING AS EXPECTED  Goal: Will not experience complications related to bowel motility  Outcome: PROGRESSING AS EXPECTED     Problem: Knowledge Deficit  Goal: Knowledge of disease process/condition, treatment plan, diagnostic tests, and medications will improve  Outcome: PROGRESSING AS EXPECTED  Goal: Knowledge of the prescribed therapeutic regimen will improve  Outcome: PROGRESSING AS EXPECTED     Problem: Discharge Barriers/Planning  Goal: Patient's continuum of care needs will be met  Outcome: PROGRESSING AS EXPECTED     Problem: Fluid Volume:  Goal: Will maintain balanced intake and output  Outcome: PROGRESSING AS EXPECTED     Problem: Respiratory:  Goal: Respiratory status will improve  Outcome: PROGRESSING AS EXPECTED     Problem: Pain Management  Goal: Pain level will decrease to patient's comfort goal  Outcome: PROGRESSING AS EXPECTED     Problem: Skin Integrity  Goal: Risk for impaired skin integrity will decrease  Outcome: PROGRESSING AS EXPECTED     Problem: Urinary Elimination:  Goal: Ability to reestablish a normal urinary elimination pattern will improve  Outcome: PROGRESSING AS EXPECTED     Problem: Psychosocial Needs:  Goal: Level of anxiety will decrease  Outcome: PROGRESSING AS EXPECTED

## 2020-08-18 NOTE — DISCHARGE PLANNING
Medical Social Work    Anticipated Discharge Disposition: SNF    Action: Per chart review, pt has a hx of frequent hospital admissions and pt has a hx of being declined by SNFs and HH agencies in the past due to behaviors and psych history.     This admission, Case Management has spoken to pt's Grandmother, Vivienne, who states that she would be willing to take the pt back home if pt gets into a SNF for rehab. Vivienne would prefer that pt go to [facility], however, due to pt's hx of being declined by SNFs a blanket referral was sent.       Barriers to Discharge: SNF acceptance and pt has been declined by many SNFs in the past. Pt's Grandmother, Vivienne, is unwilling to take the pt back home if pt does not go to a SNF first.     Plan: Awaiting SNF acceptance.

## 2020-08-19 ENCOUNTER — APPOINTMENT (OUTPATIENT)
Dept: CARDIOLOGY | Facility: MEDICAL CENTER | Age: 30
DRG: 123 | End: 2020-08-19
Attending: HOSPITALIST
Payer: MEDICARE

## 2020-08-19 LAB
CARDIOLIPIN IGA SER IA-ACNC: 0 APL (ref 0–11)
CARDIOLIPIN IGG SER IA-ACNC: 1 GPL (ref 0–14)
CARDIOLIPIN IGM SER IA-ACNC: 0 MPL (ref 0–12)
GLUCOSE BLD-MCNC: 101 MG/DL (ref 65–99)
GLUCOSE BLD-MCNC: 108 MG/DL (ref 65–99)
GLUCOSE BLD-MCNC: 86 MG/DL (ref 65–99)
GLUCOSE BLD-MCNC: 95 MG/DL (ref 65–99)
LV EJECT FRACT  99904: 60
PROT C ACT/NOR PPP: 183 % (ref 83–168)
PROT S ACT/NOR PPP: 125 % (ref 57–131)

## 2020-08-19 PROCEDURE — 700111 HCHG RX REV CODE 636 W/ 250 OVERRIDE (IP): Performed by: HOSPITALIST

## 2020-08-19 PROCEDURE — A9270 NON-COVERED ITEM OR SERVICE: HCPCS | Performed by: HOSPITALIST

## 2020-08-19 PROCEDURE — 700117 HCHG RX CONTRAST REV CODE 255: Performed by: HOSPITALIST

## 2020-08-19 PROCEDURE — 99232 SBSQ HOSP IP/OBS MODERATE 35: CPT | Performed by: HOSPITALIST

## 2020-08-19 PROCEDURE — 700111 HCHG RX REV CODE 636 W/ 250 OVERRIDE (IP): Performed by: INTERNAL MEDICINE

## 2020-08-19 PROCEDURE — 700101 HCHG RX REV CODE 250: Performed by: HOSPITALIST

## 2020-08-19 PROCEDURE — 700102 HCHG RX REV CODE 250 W/ 637 OVERRIDE(OP): Performed by: HOSPITALIST

## 2020-08-19 PROCEDURE — A9270 NON-COVERED ITEM OR SERVICE: HCPCS | Performed by: INTERNAL MEDICINE

## 2020-08-19 PROCEDURE — 93306 TTE W/DOPPLER COMPLETE: CPT

## 2020-08-19 PROCEDURE — 82962 GLUCOSE BLOOD TEST: CPT | Mod: 91

## 2020-08-19 PROCEDURE — 93306 TTE W/DOPPLER COMPLETE: CPT | Mod: 26 | Performed by: INTERNAL MEDICINE

## 2020-08-19 PROCEDURE — 770001 HCHG ROOM/CARE - MED/SURG/GYN PRIV*

## 2020-08-19 PROCEDURE — 700112 HCHG RX REV CODE 229: Performed by: INTERNAL MEDICINE

## 2020-08-19 PROCEDURE — 700105 HCHG RX REV CODE 258: Performed by: HOSPITALIST

## 2020-08-19 PROCEDURE — 700102 HCHG RX REV CODE 250 W/ 637 OVERRIDE(OP): Performed by: INTERNAL MEDICINE

## 2020-08-19 RX ORDER — ALBUTEROL SULFATE 90 UG/1
2 AEROSOL, METERED RESPIRATORY (INHALATION) EVERY 4 HOURS PRN
Status: DISCONTINUED | OUTPATIENT
Start: 2020-08-19 | End: 2020-09-14 | Stop reason: HOSPADM

## 2020-08-19 RX ADMIN — ACETAZOLAMIDE 1500 MG: 500 CAPSULE, EXTENDED RELEASE ORAL at 06:16

## 2020-08-19 RX ADMIN — PREDNISONE 10 MG: 10 TABLET ORAL at 05:50

## 2020-08-19 RX ADMIN — ACETAMINOPHEN 500 MG: 500 TABLET ORAL at 03:30

## 2020-08-19 RX ADMIN — ZIPRASIDONE HYDROCHLORIDE 40 MG: 40 CAPSULE ORAL at 05:51

## 2020-08-19 RX ADMIN — CEFTRIAXONE SODIUM 2 G: 2 INJECTION, POWDER, FOR SOLUTION INTRAMUSCULAR; INTRAVENOUS at 06:04

## 2020-08-19 RX ADMIN — MINERAL OIL, PETROLATUM 1 APPLICATION: 425; 573 OINTMENT OPHTHALMIC at 00:32

## 2020-08-19 RX ADMIN — MINERAL OIL, PETROLATUM 1 APPLICATION: 425; 573 OINTMENT OPHTHALMIC at 16:06

## 2020-08-19 RX ADMIN — KETOROLAC TROMETHAMINE 15 MG: 30 INJECTION, SOLUTION INTRAMUSCULAR at 05:51

## 2020-08-19 RX ADMIN — TOPIRAMATE 50 MG: 25 TABLET, FILM COATED ORAL at 05:51

## 2020-08-19 RX ADMIN — ENOXAPARIN SODIUM 40 MG: 40 INJECTION SUBCUTANEOUS at 05:49

## 2020-08-19 RX ADMIN — MYCOPHENOLATE MOFETIL 1000 MG: 250 CAPSULE ORAL at 06:17

## 2020-08-19 RX ADMIN — SODIUM BICARBONATE 650 MG: 650 TABLET ORAL at 16:04

## 2020-08-19 RX ADMIN — IVABRADINE 7.5 MG: 7.5 TABLET, FILM COATED ORAL at 16:06

## 2020-08-19 RX ADMIN — MINERAL OIL, PETROLATUM 1 APPLICATION: 425; 573 OINTMENT OPHTHALMIC at 12:36

## 2020-08-19 RX ADMIN — ACETAMINOPHEN 500 MG: 500 TABLET ORAL at 12:36

## 2020-08-19 RX ADMIN — SODIUM BICARBONATE 650 MG: 650 TABLET ORAL at 20:35

## 2020-08-19 RX ADMIN — DOCUSATE SODIUM 100 MG: 100 CAPSULE, LIQUID FILLED ORAL at 05:50

## 2020-08-19 RX ADMIN — PREGABALIN 300 MG: 150 CAPSULE ORAL at 16:13

## 2020-08-19 RX ADMIN — HUMAN ALBUMIN MICROSPHERES AND PERFLUTREN 3 ML: 10; .22 INJECTION, SOLUTION INTRAVENOUS at 11:52

## 2020-08-19 RX ADMIN — SODIUM BICARBONATE 650 MG: 650 TABLET ORAL at 09:28

## 2020-08-19 RX ADMIN — KETOROLAC TROMETHAMINE 15 MG: 30 INJECTION, SOLUTION INTRAMUSCULAR at 16:03

## 2020-08-19 RX ADMIN — ASPIRIN 81 MG: 81 TABLET, COATED ORAL at 05:50

## 2020-08-19 RX ADMIN — TOPIRAMATE 50 MG: 25 TABLET, FILM COATED ORAL at 16:03

## 2020-08-19 RX ADMIN — ZIPRASIDONE HYDROCHLORIDE 40 MG: 40 CAPSULE ORAL at 16:05

## 2020-08-19 RX ADMIN — GABAPENTIN 300 MG: 300 CAPSULE ORAL at 16:04

## 2020-08-19 RX ADMIN — OXYCODONE HYDROCHLORIDE 5 MG: 5 TABLET ORAL at 07:44

## 2020-08-19 RX ADMIN — DOCUSATE SODIUM 50 MG AND SENNOSIDES 8.6 MG 2 TABLET: 8.6; 5 TABLET, FILM COATED ORAL at 05:50

## 2020-08-19 RX ADMIN — LEVOTHYROXINE SODIUM 75 MCG: 0.07 TABLET ORAL at 05:51

## 2020-08-19 RX ADMIN — ALBUTEROL SULFATE 2 PUFF: 90 AEROSOL, METERED RESPIRATORY (INHALATION) at 12:42

## 2020-08-19 RX ADMIN — OXCARBAZEPINE 150 MG: 150 TABLET, FILM COATED ORAL at 05:51

## 2020-08-19 RX ADMIN — FLUOXETINE 40 MG: 20 CAPSULE ORAL at 05:50

## 2020-08-19 RX ADMIN — ACETAZOLAMIDE 1000 MG: 500 CAPSULE, EXTENDED RELEASE ORAL at 16:06

## 2020-08-19 RX ADMIN — TRAZODONE HYDROCHLORIDE 100 MG: 100 TABLET ORAL at 20:35

## 2020-08-19 RX ADMIN — GABAPENTIN 300 MG: 300 CAPSULE ORAL at 12:34

## 2020-08-19 RX ADMIN — MINERAL OIL, PETROLATUM 1 APPLICATION: 425; 573 OINTMENT OPHTHALMIC at 05:52

## 2020-08-19 RX ADMIN — MYCOPHENOLATE MOFETIL 1000 MG: 250 CAPSULE ORAL at 16:05

## 2020-08-19 RX ADMIN — PREGABALIN 300 MG: 150 CAPSULE ORAL at 06:16

## 2020-08-19 RX ADMIN — GABAPENTIN 300 MG: 300 CAPSULE ORAL at 05:51

## 2020-08-19 RX ADMIN — SODIUM BICARBONATE 650 MG: 650 TABLET ORAL at 06:18

## 2020-08-19 RX ADMIN — OXCARBAZEPINE 150 MG: 150 TABLET, FILM COATED ORAL at 16:04

## 2020-08-19 ASSESSMENT — ENCOUNTER SYMPTOMS
NECK PAIN: 0
COUGH: 0
CLAUDICATION: 0
PALPITATIONS: 0
ORTHOPNEA: 0
HEARTBURN: 0
BLURRED VISION: 1
DIZZINESS: 0
VOMITING: 0
HALLUCINATIONS: 0
NAUSEA: 0
DEPRESSION: 0
HEADACHES: 0
FOCAL WEAKNESS: 1
FEVER: 0
SENSORY CHANGE: 0
WEIGHT LOSS: 0
MYALGIAS: 1
TINGLING: 0
BACK PAIN: 0
CHILLS: 0
DIARRHEA: 0
SPUTUM PRODUCTION: 0
ABDOMINAL PAIN: 1
TREMORS: 0

## 2020-08-19 ASSESSMENT — LIFESTYLE VARIABLES: SUBSTANCE_ABUSE: 0

## 2020-08-19 ASSESSMENT — FIBROSIS 4 INDEX: FIB4 SCORE: 0.33

## 2020-08-19 NOTE — PROGRESS NOTES
[facility] Medicine Daily Progress Note    Date of Service  8/19/2020    Chief Complaint  29 y.o. female admitted 8/15/2020 with blurry vision      Interval Problem Update  Blurry vision is better    Less pain pain at suprapubic cath site     Referred to snf  Treating UTI    Consultants/Specialty  Eye md    Code Status  Full Code    Disposition  home    Review of Systems  Review of Systems   Constitutional: Negative for chills, fever and weight loss.   HENT: Negative for ear discharge, ear pain and hearing loss.    Eyes: Positive for blurred vision.   Respiratory: Negative for cough and sputum production.    Cardiovascular: Negative for chest pain, palpitations, orthopnea and claudication.   Gastrointestinal: Positive for abdominal pain. Negative for diarrhea, heartburn, nausea and vomiting.   Genitourinary: Negative for dysuria, frequency and urgency.   Musculoskeletal: Positive for joint pain and myalgias. Negative for back pain and neck pain.   Neurological: Positive for focal weakness. Negative for dizziness, tingling, tremors, sensory change and headaches.   Psychiatric/Behavioral: Negative for depression, hallucinations, substance abuse and suicidal ideas.        Physical Exam  Temp:  [36 °C (96.8 °F)-36.5 °C (97.7 °F)] 36.4 °C (97.6 °F)  Pulse:  [67-78] 72  Resp:  [16-18] 16  BP: (105-124)/(52-72) 105/52  SpO2:  [92 %-97 %] 96 %    Physical Exam  Constitutional:       Appearance: She is obese. She is ill-appearing.   HENT:      Head: Normocephalic and atraumatic.      Mouth/Throat:      Mouth: Mucous membranes are moist.   Eyes:      General: No scleral icterus.     Extraocular Movements: Extraocular movements intact.      Pupils: Pupils are equal, round, and reactive to light.   Neck:      Musculoskeletal: Normal range of motion and neck supple.   Cardiovascular:      Rate and Rhythm: Normal rate and regular rhythm.      Heart sounds: No murmur. No friction rub. No gallop.    Pulmonary:      Effort: Pulmonary  effort is normal. No respiratory distress.      Breath sounds: No stridor. No wheezing or rhonchi.   Chest:      Chest wall: No tenderness.   Abdominal:      General: There is distension.      Palpations: There is no mass.      Tenderness: There is no abdominal tenderness. There is no guarding or rebound.      Hernia: No hernia is present.      Comments: Mild redness around suprapubic cath   Musculoskeletal: Normal range of motion.         General: No swelling, tenderness, deformity or signs of injury.      Right lower leg: No edema.   Skin:     General: Skin is warm.      Coloration: Skin is not jaundiced or pale.      Findings: No bruising, erythema, lesion or rash.   Neurological:      Mental Status: She is alert and oriented to person, place, and time.      Comments: B/l leg weakness    B/l foot droop   Psychiatric:         Mood and Affect: Mood normal.         Fluids    Intake/Output Summary (Last 24 hours) at 8/19/2020 1130  Last data filed at 8/19/2020 0745  Gross per 24 hour   Intake 240 ml   Output --   Net 240 ml       Laboratory                        Imaging  CT-CTA NECK WITH & W/O-POST PROCESSING   Final Result      No high-grade stenosis, large vessel occlusion, aneurysm or dissection.      CT-CTA HEAD WITH & W/O-POST PROCESS   Final Result      No intracranial aneurysm, focal stenosis, or abrupt large vessel cut off.      US-CAROTID DOPPLER BILAT   Final Result      CT-HEAD W/O   Final Result      No CT evidence of acute infarct, hemorrhage or mass.      EC-ECHOCARDIOGRAM COMPLETE W/ CONT    (Results Pending)        Assessment/Plan  Optic neuritis- (present on admission)  Assessment & Plan  Continue outpatient prednisone   ophthalmology consulted, Dr. Aparicio.. signed off  continue home Cellcept     cta head and neck--WNL    Echo    hypercoag w./u            Hypokalemia- (present on admission)  Assessment & Plan  Replaced    Diabetes mellitus type 2 in obese (HCC)- (present on admission)  Assessment &  Plan  Continue outpatient regiment with regular insulin sliding scale.    Insulin dependent  Follow-up A1c is 5    Morbidly obese (HCC)- (present on admission)  Assessment & Plan  BMI 42    Dry eyes- (present on admission)  Assessment & Plan  Eye lubrication     Pain in both feet- (present on admission)  Assessment & Plan  Pain control with po oxycodone        Idiopathic intracranial hypertension- (present on admission)  Assessment & Plan  History of  She is followed by Dr. 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    Blurred vision  Assessment & Plan  Presumed retinopathy, f/u ophthalmology consult, carotid Doppler ultrasound ordered    Presence of suprapubic catheter (HCC)- (present on admission)  Assessment & Plan  Infected    Too early to change catheter    Urinary tract infection associated with catheterization of urinary tract (HCC)- (present on admission)  Assessment & Plan  Iv rocephin    Schizophrenia (HCC)- (present on admission)  Assessment & Plan  Reported history of   Continue Geodon and Prozac    Peripheral neuropathy and chornic pain syndrome (CMS-HCC)- (present on admission)  Assessment & Plan  At risk of morbid obesity hypoventilation, avoid excessive medications limiting respiratory drive.       VTE prophylaxis: scd    Transfer to neuro    A[facility]

## 2020-08-19 NOTE — ASSESSMENT & PLAN NOTE
Likely colonized urinary tract given history of indwelling suprapubic catheter  ESBL klebsiella--> IV meropenem x 7 days completed on 8/26  UA 9/2/2020 shows budding yeast, and new UTI  9/4: Repeated UA was done prior of Andrade catheter exchange which likely will be positive due to colonization.  There was budding yeast on UA which also considered as colonization.  However, patient was started on Merrem again which will be discontinued.  Will only complete 5 days course of Diflucan since repeat urine culture was not obtained.

## 2020-08-19 NOTE — PROGRESS NOTES
[facility] Medicine Daily Progress Note    Date of Service  8/18/2020    Chief Complaint  29 y.o. female admitted 8/15/2020 with blurry vision      Interval Problem Update  Her blurry vision is about the same .    Case d/w dr kent neuro eye md--> he requests echo, CTa head and neck and hypercoagulable workup--> dry eyes    Patient c/o b/l ankle pain( chronic due to old injury)    C/o pain at suprapubic cath site     Referred to [facility]  ua c/w uti    Consultants/Specialty  Eye md    Code Status  Full Code    Disposition  home    Review of Systems  Review of Systems   Constitutional: Negative for chills, fever and weight loss.   HENT: Negative for ear discharge, ear pain and hearing loss.    Eyes: Positive for blurred vision.   Respiratory: Negative for cough and sputum production.    Cardiovascular: Negative for chest pain, palpitations, orthopnea and claudication.   Gastrointestinal: Positive for abdominal pain. Negative for diarrhea, heartburn, nausea and vomiting.   Genitourinary: Negative for dysuria, frequency and urgency.   Musculoskeletal: Positive for joint pain and myalgias. Negative for back pain and neck pain.   Neurological: Negative for dizziness, tingling, tremors, sensory change and headaches.   Psychiatric/Behavioral: Negative for depression, hallucinations, substance abuse and suicidal ideas.        Physical Exam  Temp:  [36 °C (96.8 °F)-37.1 °C (98.8 °F)] 36 °C (96.8 °F)  Pulse:  [67-76] 67  Resp:  [18] 18  BP: (102-124)/(57-72) 124/72  SpO2:  [95 %-97 %] 97 %    Physical Exam  Constitutional:       Appearance: She is obese. She is ill-appearing.   HENT:      Head: Normocephalic and atraumatic.      Mouth/Throat:      Mouth: Mucous membranes are moist.   Eyes:      General: No scleral icterus.     Extraocular Movements: Extraocular movements intact.      Pupils: Pupils are equal, round, and reactive to light.   Neck:      Musculoskeletal: Normal range of motion and neck supple.   Cardiovascular:       Rate and Rhythm: Normal rate and regular rhythm.      Heart sounds: No murmur. No friction rub. No gallop.    Pulmonary:      Effort: Pulmonary effort is normal. No respiratory distress.      Breath sounds: No stridor. No wheezing or rhonchi.   Chest:      Chest wall: No tenderness.   Abdominal:      General: There is distension.      Palpations: There is no mass.      Tenderness: There is no abdominal tenderness. There is no guarding or rebound.      Hernia: No hernia is present.      Comments: Mild redness around suprapubic cath   Musculoskeletal: Normal range of motion.         General: No swelling, tenderness, deformity or signs of injury.      Right lower leg: No edema.   Skin:     General: Skin is warm.      Coloration: Skin is not jaundiced or pale.      Findings: No bruising, erythema, lesion or rash.   Neurological:      Mental Status: She is alert and oriented to person, place, and time.      Comments: B/l leg weakness    B/l foot droop   Psychiatric:         Mood and Affect: Mood normal.         Fluids    Intake/Output Summary (Last 24 hours) at 8/18/2020 1712  Last data filed at 8/18/2020 0959  Gross per 24 hour   Intake 240 ml   Output 400 ml   Net -160 ml       Laboratory  Recent Labs     08/16/20  0321   WBC 5.6   RBC 4.57   HEMOGLOBIN 13.3   HEMATOCRIT 41.4   MCV 90.6   MCH 29.1   MCHC 32.1*   RDW 42.4   PLATELETCT 190   MPV 11.5     Recent Labs     08/16/20  0321   SODIUM 145   POTASSIUM 3.5*   CHLORIDE 113*   CO2 18*   GLUCOSE 85   BUN 17   CREATININE 0.79   CALCIUM 9.4                   Imaging  CT-CTA NECK WITH & W/O-POST PROCESSING   Final Result      No high-grade stenosis, large vessel occlusion, aneurysm or dissection.      CT-CTA HEAD WITH & W/O-POST PROCESS   Final Result      No intracranial aneurysm, focal stenosis, or abrupt large vessel cut off.      US-CAROTID DOPPLER BILAT   Final Result      CT-HEAD W/O   Final Result      No CT evidence of acute infarct, hemorrhage or mass.       EC-ECHOCARDIOGRAM COMPLETE W/ CONT    (Results Pending)        Assessment/Plan  Optic neuritis- (present on admission)  Assessment & Plan  Continue outpatient prednisone   ophthalmology consulted, Dr. Aparicio.. signed off  continue home Cellcept     cta head and neck--WNL    Echo    hypercoag w./u    Neuro eye md tomorrow at 10 30 am        Hypokalemia- (present on admission)  Assessment & Plan  Replaced    Diabetes mellitus type 2 in obese (HCC)- (present on admission)  Assessment & Plan  Continue outpatient regiment with regular insulin sliding scale.    Insulin dependent  Follow-up A1c is 5    Morbidly obese (HCC)- (present on admission)  Assessment & Plan  BMI 42    Dry eyes- (present on admission)  Assessment & Plan  Eye lubrication ordered    Pain in both feet- (present on admission)  Assessment & Plan  Pain control with po oxycodone        Idiopathic intracranial hypertension- (present on admission)  Assessment & Plan  History of  She is followed by Dr. 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    Blurred vision  Assessment & Plan  Presumed retinopathy, f/u ophthalmology consult, carotid Doppler ultrasound ordered    Presence of suprapubic catheter (HCC)- (present on admission)  Assessment & Plan  Infected    Too early to change catheter    Urinary tract infection associated with catheterization of urinary tract (HCC)- (present on admission)  Assessment & Plan  Iv rocephin    Schizophrenia (HCC)- (present on admission)  Assessment & Plan  Reported history of   Continue Geodon and Prozac    Peripheral neuropathy and chornic pain syndrome (CMS-HCC)- (present on admission)  Assessment & Plan  At risk of morbid obesity hypoventilation, avoid excessive medications limiting respiratory  drive.       VTE prophylaxis: scd    Transfer to neuro

## 2020-08-19 NOTE — DISCHARGE PLANNING
Agency/Facility Name: [facility]   Spoke To: Ness   Outcome: Per Ness she is going to review the referral and call the CCA back.     Agency/Facility Name: [facility]  Spoke To: Lm   Outcome: Per Lm Admission is waiting for an onsite visit from Tatyana.     Agency/Facility Name: [facility]  Outcome: Voice mail left regarding referral. Requested a call back.    Agency[facility]Facility Name: [facility]  Spoke To: Admission   Outcome: Admission wants to know patients discharge plan due to in the past having trouble discharging patient from other facilities.

## 2020-08-19 NOTE — CARE PLAN
Problem: Communication  Goal: The ability to communicate needs accurately and effectively will improve  Outcome: PROGRESSING AS EXPECTED  Note: Pt communicates needs effectively with staff.       Problem: Safety  Goal: Will remain free from falls  Outcome: PROGRESSING AS EXPECTED  Note: Appropriate fall precautions in place; pt verbalizes understanding      Problem: Psychosocial Needs:  Goal: Level of anxiety will decrease  Outcome: PROGRESSING SLOWER THAN EXPECTED

## 2020-08-19 NOTE — PROGRESS NOTES
Bedside report received from SONYA Marino. POC discussed with pt; all questions answered at this time. Fall and seizure precautions in place. Call light within reach.

## 2020-08-19 NOTE — CARE PLAN
Problem: Safety  Goal: Will remain free from falls  Outcome: PROGRESSING AS EXPECTED   Fall precautions in place. Bed locked in lowest position. Bed alarm on. [place] socks on. Call light within reach.     Problem: Knowledge Deficit  Goal: Knowledge of disease process/condition, treatment plan, diagnostic tests, and medications will improve  Outcome: PROGRESSING AS EXPECTED  POC discussed with patient. Pt verbalized understanding. All pt questions answered at this time.

## 2020-08-19 NOTE — PROGRESS NOTES
Received bedside report from Sinai HASSAN and Dipak RN regarding prior 12 hours. Pt asleep in bed. No signs of pain or distress, Bed locked in lowest position. Call light within reach. Will continue to monitor.

## 2020-08-20 LAB
ANION GAP SERPL CALC-SCNC: 14 MMOL/L (ref 7–16)
BUN SERPL-MCNC: 16 MG/DL (ref 8–22)
CALCIUM SERPL-MCNC: 8.9 MG/DL (ref 8.5–10.5)
CHLORIDE SERPL-SCNC: 111 MMOL/L (ref 96–112)
CO2 SERPL-SCNC: 16 MMOL/L (ref 20–33)
CREAT SERPL-MCNC: 0.77 MG/DL (ref 0.5–1.4)
ERYTHROCYTE [DISTWIDTH] IN BLOOD BY AUTOMATED COUNT: 42.8 FL (ref 35.9–50)
GLUCOSE BLD-MCNC: 113 MG/DL (ref 65–99)
GLUCOSE BLD-MCNC: 86 MG/DL (ref 65–99)
GLUCOSE BLD-MCNC: 90 MG/DL (ref 65–99)
GLUCOSE BLD-MCNC: 91 MG/DL (ref 65–99)
GLUCOSE SERPL-MCNC: 88 MG/DL (ref 65–99)
GRAM STN SPEC: NORMAL
HCT VFR BLD AUTO: 36.9 % (ref 37–47)
HGB BLD-MCNC: 12 G/DL (ref 12–16)
MCH RBC QN AUTO: 29.3 PG (ref 27–33)
MCHC RBC AUTO-ENTMCNC: 32.5 G/DL (ref 33.6–35)
MCV RBC AUTO: 90.2 FL (ref 81.4–97.8)
PLATELET # BLD AUTO: 165 K/UL (ref 164–446)
PMV BLD AUTO: 11.6 FL (ref 9–12.9)
POTASSIUM SERPL-SCNC: 2.9 MMOL/L (ref 3.6–5.5)
RBC # BLD AUTO: 4.09 M/UL (ref 4.2–5.4)
SIGNIFICANT IND 70042: NORMAL
SITE SITE: NORMAL
SODIUM SERPL-SCNC: 141 MMOL/L (ref 135–145)
SOURCE SOURCE: NORMAL
WBC # BLD AUTO: 6.7 K/UL (ref 4.8–10.8)

## 2020-08-20 PROCEDURE — 82962 GLUCOSE BLOOD TEST: CPT | Mod: 91

## 2020-08-20 PROCEDURE — 700102 HCHG RX REV CODE 250 W/ 637 OVERRIDE(OP): Performed by: HOSPITALIST

## 2020-08-20 PROCEDURE — A9270 NON-COVERED ITEM OR SERVICE: HCPCS | Performed by: HOSPITALIST

## 2020-08-20 PROCEDURE — 700112 HCHG RX REV CODE 229: Performed by: INTERNAL MEDICINE

## 2020-08-20 PROCEDURE — 80048 BASIC METABOLIC PNL TOTAL CA: CPT

## 2020-08-20 PROCEDURE — 700101 HCHG RX REV CODE 250: Performed by: HOSPITALIST

## 2020-08-20 PROCEDURE — 700105 HCHG RX REV CODE 258: Performed by: HOSPITALIST

## 2020-08-20 PROCEDURE — 36415 COLL VENOUS BLD VENIPUNCTURE: CPT

## 2020-08-20 PROCEDURE — 770001 HCHG ROOM/CARE - MED/SURG/GYN PRIV*

## 2020-08-20 PROCEDURE — 700111 HCHG RX REV CODE 636 W/ 250 OVERRIDE (IP): Performed by: HOSPITALIST

## 2020-08-20 PROCEDURE — 99232 SBSQ HOSP IP/OBS MODERATE 35: CPT | Performed by: HOSPITALIST

## 2020-08-20 PROCEDURE — 700102 HCHG RX REV CODE 250 W/ 637 OVERRIDE(OP): Performed by: INTERNAL MEDICINE

## 2020-08-20 PROCEDURE — A9270 NON-COVERED ITEM OR SERVICE: HCPCS | Performed by: INTERNAL MEDICINE

## 2020-08-20 PROCEDURE — 85027 COMPLETE CBC AUTOMATED: CPT

## 2020-08-20 PROCEDURE — 700111 HCHG RX REV CODE 636 W/ 250 OVERRIDE (IP): Performed by: INTERNAL MEDICINE

## 2020-08-20 RX ORDER — BUSPIRONE HYDROCHLORIDE 10 MG/1
10 TABLET ORAL 2 TIMES DAILY
Status: DISCONTINUED | OUTPATIENT
Start: 2020-08-20 | End: 2020-09-14 | Stop reason: HOSPADM

## 2020-08-20 RX ORDER — POTASSIUM CHLORIDE 20 MEQ/1
20 TABLET, EXTENDED RELEASE ORAL 3 TIMES DAILY
Status: DISCONTINUED | OUTPATIENT
Start: 2020-08-20 | End: 2020-08-27

## 2020-08-20 RX ORDER — IBUPROFEN 600 MG/1
600 TABLET ORAL 3 TIMES DAILY PRN
Status: DISCONTINUED | OUTPATIENT
Start: 2020-08-20 | End: 2020-09-14 | Stop reason: HOSPADM

## 2020-08-20 RX ADMIN — DOCUSATE SODIUM 100 MG: 100 CAPSULE, LIQUID FILLED ORAL at 04:41

## 2020-08-20 RX ADMIN — PREDNISONE 10 MG: 10 TABLET ORAL at 04:43

## 2020-08-20 RX ADMIN — OXCARBAZEPINE 150 MG: 150 TABLET, FILM COATED ORAL at 04:43

## 2020-08-20 RX ADMIN — MINERAL OIL, PETROLATUM 1 APPLICATION: 425; 573 OINTMENT OPHTHALMIC at 04:38

## 2020-08-20 RX ADMIN — TOPIRAMATE 50 MG: 25 TABLET, FILM COATED ORAL at 04:42

## 2020-08-20 RX ADMIN — SODIUM BICARBONATE 650 MG: 650 TABLET ORAL at 09:23

## 2020-08-20 RX ADMIN — POTASSIUM CHLORIDE 20 MEQ: 20 TABLET, EXTENDED RELEASE ORAL at 09:46

## 2020-08-20 RX ADMIN — ENOXAPARIN SODIUM 40 MG: 40 INJECTION SUBCUTANEOUS at 04:39

## 2020-08-20 RX ADMIN — DOCUSATE SODIUM 50 MG AND SENNOSIDES 8.6 MG 2 TABLET: 8.6; 5 TABLET, FILM COATED ORAL at 04:39

## 2020-08-20 RX ADMIN — FLUOXETINE 40 MG: 20 CAPSULE ORAL at 04:42

## 2020-08-20 RX ADMIN — GABAPENTIN 300 MG: 300 CAPSULE ORAL at 16:12

## 2020-08-20 RX ADMIN — ACETAMINOPHEN 500 MG: 500 TABLET ORAL at 22:43

## 2020-08-20 RX ADMIN — OXYCODONE HYDROCHLORIDE 5 MG: 5 TABLET ORAL at 11:10

## 2020-08-20 RX ADMIN — ASPIRIN 81 MG: 81 TABLET, COATED ORAL at 04:42

## 2020-08-20 RX ADMIN — BUSPIRONE HYDROCHLORIDE 10 MG: 10 TABLET ORAL at 09:23

## 2020-08-20 RX ADMIN — PREGABALIN 300 MG: 150 CAPSULE ORAL at 05:09

## 2020-08-20 RX ADMIN — GABAPENTIN 300 MG: 300 CAPSULE ORAL at 11:10

## 2020-08-20 RX ADMIN — MYCOPHENOLATE MOFETIL 1000 MG: 250 CAPSULE ORAL at 04:41

## 2020-08-20 RX ADMIN — IVABRADINE 7.5 MG: 7.5 TABLET, FILM COATED ORAL at 09:23

## 2020-08-20 RX ADMIN — GABAPENTIN 300 MG: 300 CAPSULE ORAL at 04:43

## 2020-08-20 RX ADMIN — PREGABALIN 300 MG: 150 CAPSULE ORAL at 16:11

## 2020-08-20 RX ADMIN — ACETAMINOPHEN 500 MG: 500 TABLET ORAL at 07:28

## 2020-08-20 RX ADMIN — MYCOPHENOLATE MOFETIL 1000 MG: 250 CAPSULE ORAL at 16:10

## 2020-08-20 RX ADMIN — ACETAZOLAMIDE 1000 MG: 500 CAPSULE, EXTENDED RELEASE ORAL at 16:10

## 2020-08-20 RX ADMIN — SODIUM BICARBONATE 650 MG: 650 TABLET ORAL at 04:43

## 2020-08-20 RX ADMIN — ZIPRASIDONE HYDROCHLORIDE 40 MG: 40 CAPSULE ORAL at 16:12

## 2020-08-20 RX ADMIN — SODIUM BICARBONATE 650 MG: 650 TABLET ORAL at 19:48

## 2020-08-20 RX ADMIN — IBUPROFEN 600 MG: 600 TABLET, FILM COATED ORAL at 16:10

## 2020-08-20 RX ADMIN — SODIUM BICARBONATE 650 MG: 650 TABLET ORAL at 16:12

## 2020-08-20 RX ADMIN — LEVOTHYROXINE SODIUM 75 MCG: 0.07 TABLET ORAL at 04:43

## 2020-08-20 RX ADMIN — ZIPRASIDONE HYDROCHLORIDE 40 MG: 40 CAPSULE ORAL at 07:28

## 2020-08-20 RX ADMIN — ACETAZOLAMIDE 1500 MG: 500 CAPSULE, EXTENDED RELEASE ORAL at 04:39

## 2020-08-20 RX ADMIN — BUSPIRONE HYDROCHLORIDE 10 MG: 10 TABLET ORAL at 19:48

## 2020-08-20 RX ADMIN — TRAZODONE HYDROCHLORIDE 100 MG: 100 TABLET ORAL at 19:47

## 2020-08-20 RX ADMIN — MINERAL OIL, PETROLATUM 1 APPLICATION: 425; 573 OINTMENT OPHTHALMIC at 11:10

## 2020-08-20 RX ADMIN — OXCARBAZEPINE 150 MG: 150 TABLET, FILM COATED ORAL at 16:10

## 2020-08-20 RX ADMIN — IVABRADINE 7.5 MG: 7.5 TABLET, FILM COATED ORAL at 16:10

## 2020-08-20 RX ADMIN — TOPIRAMATE 50 MG: 25 TABLET, FILM COATED ORAL at 16:11

## 2020-08-20 RX ADMIN — OXYCODONE HYDROCHLORIDE 5 MG: 5 TABLET ORAL at 19:48

## 2020-08-20 RX ADMIN — POTASSIUM CHLORIDE 20 MEQ: 20 TABLET, EXTENDED RELEASE ORAL at 16:12

## 2020-08-20 RX ADMIN — MINERAL OIL, PETROLATUM 1 APPLICATION: 425; 573 OINTMENT OPHTHALMIC at 16:10

## 2020-08-20 RX ADMIN — CEFTRIAXONE SODIUM 2 G: 2 INJECTION, POWDER, FOR SOLUTION INTRAMUSCULAR; INTRAVENOUS at 04:35

## 2020-08-20 ASSESSMENT — ENCOUNTER SYMPTOMS
VOMITING: 0
NECK PAIN: 0
CHILLS: 0
PALPITATIONS: 0
SENSORY CHANGE: 0
DEPRESSION: 0
FOCAL WEAKNESS: 1
ORTHOPNEA: 0
DIARRHEA: 0
SPUTUM PRODUCTION: 0
DIZZINESS: 0
BLURRED VISION: 0
MYALGIAS: 1
CLAUDICATION: 0
COUGH: 0
FEVER: 0
HEARTBURN: 0
TINGLING: 0
WEIGHT LOSS: 0
ABDOMINAL PAIN: 0
TREMORS: 0
HALLUCINATIONS: 0
NAUSEA: 0
BACK PAIN: 0
HEADACHES: 0

## 2020-08-20 ASSESSMENT — FIBROSIS 4 INDEX: FIB4 SCORE: 0.38

## 2020-08-20 ASSESSMENT — LIFESTYLE VARIABLES: SUBSTANCE_ABUSE: 0

## 2020-08-20 NOTE — PROGRESS NOTES
Received report and updates from Sinai HASSAN regarding prior 12 hours. Pt awake laying in bed. Pt denies pain. POC reviewed with pt. Pt verbalizes understanding; all questions answered at this time. Bed locked in lowest position. Call light within reach. Will continue to monitor.

## 2020-08-20 NOTE — CARE PLAN
Problem: Communication  Goal: The ability to communicate needs accurately and effectively will improve  Outcome: PROGRESSING AS EXPECTED  Note: Pt communicates needs effectively with staff.       Problem: Safety  Goal: Will remain free from falls  Outcome: PROGRESSING AS EXPECTED  Note: Appropriate fall precautions in place; pt verbalizes understanding      Problem: Knowledge Deficit  Goal: Knowledge of disease process/condition, treatment plan, diagnostic tests, and medications will improve  Outcome: PROGRESSING AS EXPECTED  Note: Pt educated regarding plan of care and medications. All questions answered.

## 2020-08-20 NOTE — CARE PLAN
Problem: Communication  Goal: The ability to communicate needs accurately and effectively will improve  Outcome: PROGRESSING AS EXPECTED     Problem: Safety  Goal: Will remain free from injury  Outcome: PROGRESSING AS EXPECTED     Received report at 0015  Patient AxO x 4 Respirations normal and unlabored. VSS. Fall risk protocol in place. Bed locked and in lowest position. Non-skid socks and bed alarm on.  Rounded on hourly. Call light with in reach.

## 2020-08-20 NOTE — DISCHARGE PLANNING
Agency[facility]Facility Name: [facility]   Outcome: Voice mail left regarding referral. Requested a call back.    Agency[facility]Facility Name: [facility]   Outcome: Voice mail left regarding referral. Requested a call back.    Agency/Facility Name: [facility]  Spoke To: Myesha   Outcome: Per Myesha referral is currently still being reviewed.     Agency/Facility Name: [facility]   Spoke To: Sun   Outcome: Per Sun referral is currently being reviewed by facility's nurse.

## 2020-08-20 NOTE — CARE PLAN
Problem: Safety  Goal: Will remain free from falls  Outcome: PROGRESSING AS EXPECTED   Fall precautions in place. Bed locked in lowest position. Three side rails up. Bed alarm on. Call light within reach.   Problem: Knowledge Deficit  Goal: Knowledge of disease process/condition, treatment plan, diagnostic tests, and medications will improve  Outcome: PROGRESSING AS EXPECTED

## 2020-08-20 NOTE — PROGRESS NOTES
Bedside report received from SONYA Ba. POC discussed with pt; all questions answered at this time. Fall precautions in place. Call light within reach

## 2020-08-20 NOTE — DISCHARGE PLANNING
Care Transition Team Discharge Planning    Anticipated Discharge Disposition: d/c to SNF    Action: Per AM rounds, Lsw to call liaison w/ [facility] to f/u w/ possible admission of pt.    LSw left ISELA w/ Tatyana. Lsw indicated per report she was at bedside yesterday meeting w/ pt. Lsw left contact information and requested f/u regarding possible admission of pt to SNF for rehab.     Barriers to Discharge: TBD    Plan: f/u w/ medical team, SNF liaison, CCA

## 2020-08-20 NOTE — PROGRESS NOTES
[facility] Medicine Daily Progress Note    Date of Service  8/20/2020    Chief Complaint  29 y.o. female admitted 8/15/2020 with blurry vision      Interval Problem Update    This patient said she did not sleep well last night    No other complaints    Referred to snf  Treating UTI    Patient encouraged not to sit up in his chair or the wheelchair    Low potassium 2.9    Consultants/Specialty  Eye md    Code Status  Full Code    Disposition  snf referral in process    Review of Systems  Review of Systems   Constitutional: Negative for chills, fever and weight loss.   HENT: Negative for ear discharge, ear pain and hearing loss.    Eyes: Negative for blurred vision.   Respiratory: Negative for cough and sputum production.    Cardiovascular: Negative for chest pain, palpitations, orthopnea and claudication.   Gastrointestinal: Negative for abdominal pain, diarrhea, heartburn, nausea and vomiting.   Genitourinary: Negative for dysuria, frequency and urgency.   Musculoskeletal: Positive for joint pain and myalgias. Negative for back pain and neck pain.   Neurological: Positive for focal weakness. Negative for dizziness, tingling, tremors, sensory change and headaches.   Psychiatric/Behavioral: Negative for depression, hallucinations, substance abuse and suicidal ideas.        Physical Exam  Temp:  [36.3 °C (97.3 °F)-36.7 °C (98.1 °F)] 36.7 °C (98 °F)  Pulse:  [69-76] 76  Resp:  [16] 16  BP: (106-121)/(58-72) 106/60  SpO2:  [95 %-99 %] 95 %    Physical Exam  Constitutional:       Appearance: She is obese. She is ill-appearing.   HENT:      Head: Normocephalic and atraumatic.      Mouth/Throat:      Mouth: Mucous membranes are moist.   Eyes:      General: No scleral icterus.     Extraocular Movements: Extraocular movements intact.      Pupils: Pupils are equal, round, and reactive to light.   Neck:      Musculoskeletal: Normal range of motion and neck supple.   Cardiovascular:      Rate and Rhythm: Normal rate and regular  rhythm.      Heart sounds: No murmur. No friction rub. No gallop.    Pulmonary:      Effort: Pulmonary effort is normal. No respiratory distress.      Breath sounds: No stridor. No wheezing or rhonchi.   Chest:      Chest wall: No tenderness.   Abdominal:      General: There is distension.      Palpations: There is no mass.      Tenderness: There is no abdominal tenderness. There is no guarding or rebound.      Hernia: No hernia is present.      Comments: Mild redness around suprapubic cath   Musculoskeletal: Normal range of motion.         General: No swelling, tenderness, deformity or signs of injury.      Right lower leg: No edema.   Skin:     General: Skin is warm.      Coloration: Skin is not jaundiced or pale.      Findings: No bruising, erythema, lesion or rash.   Neurological:      Mental Status: She is alert and oriented to person, place, and time.      Comments: B/l leg weakness    B/l foot droop   Psychiatric:         Mood and Affect: Mood normal.         Fluids    Intake/Output Summary (Last 24 hours) at 8/20/2020 0913  Last data filed at 8/20/2020 0513  Gross per 24 hour   Intake 240 ml   Output 1500 ml   Net -1260 ml       Laboratory  Recent Labs     08/20/20  0322   WBC 6.7   RBC 4.09*   HEMOGLOBIN 12.0   HEMATOCRIT 36.9*   MCV 90.2   MCH 29.3   MCHC 32.5*   RDW 42.8   PLATELETCT 165   MPV 11.6     Recent Labs     08/20/20  0322   SODIUM 141   POTASSIUM 2.9*   CHLORIDE 111   CO2 16*   GLUCOSE 88   BUN 16   CREATININE 0.77   CALCIUM 8.9                   Imaging  EC-ECHOCARDIOGRAM COMPLETE W/ CONT   Final Result      CT-CTA NECK WITH & W/O-POST PROCESSING   Final Result      No high-grade stenosis, large vessel occlusion, aneurysm or dissection.      CT-CTA HEAD WITH & W/O-POST PROCESS   Final Result      No intracranial aneurysm, focal stenosis, or abrupt large vessel cut off.      US-CAROTID DOPPLER BILAT   Final Result      CT-HEAD W/O   Final Result      No CT evidence of acute infarct, hemorrhage  or mass.           Assessment/Plan  Optic neuritis- (present on admission)  Assessment & Plan  Continue outpatient prednisone   ophthalmology consulted, Dr. Aparicio.. signed off  continue home Cellcept     cta head and neck--WNL    Echo.  Within normal limits    hypercoag w./u.. Unremarkable except for a mild elevation of homocystine            Hypokalemia- (present on admission)  Assessment & Plan  Last replaced on August 20, 2020    Diabetes mellitus type 2 in obese (HCC)- (present on admission)  Assessment & Plan  Continue outpatient regiment with regular insulin sliding scale.    Insulin dependent  Follow-up A1c is 5    Morbidly obese (HCC)- (present on admission)  Assessment & Plan  BMI 42    Dry eyes- (present on admission)  Assessment & Plan  Eye lubrication     Pain in both feet- (present on admission)  Assessment & Plan  Pain control with po oxycodone        Idiopathic intracranial hypertension- (present on admission)  Assessment & Plan  History of  She is followed by Dr. 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    Blurred vision  Assessment & Plan  Presumed retinopathy, f/u ophthalmology consult, carotid Doppler ultrasound ordered    Presence of suprapubic catheter (HCC)- (present on admission)  Assessment & Plan  Infected    Too early to change catheter    Urinary tract infection associated with catheterization of urinary tract (HCC)- (present on admission)  Assessment & Plan  Iv rocephin    Schizophrenia (HCC)- (present on admission)  Assessment & Plan  Reported history of   Continue Geodon and Prozac    Peripheral neuropathy and chornic pain syndrome (CMS-HCC)- (present on admission)  Assessment & Plan  At risk of morbid obesity hypoventilation, avoid excessive medications limiting  respiratory drive.       VTE prophylaxis: scd    Transfer to neuro    A.m.  BMP

## 2020-08-21 ENCOUNTER — APPOINTMENT (OUTPATIENT)
Dept: RADIOLOGY | Facility: MEDICAL CENTER | Age: 30
DRG: 123 | End: 2020-08-21
Attending: HOSPITALIST
Payer: MEDICARE

## 2020-08-21 PROBLEM — S69.91XA WRIST INJURY, RIGHT, INITIAL ENCOUNTER: Status: ACTIVE | Noted: 2020-08-21

## 2020-08-21 LAB
ANION GAP SERPL CALC-SCNC: 13 MMOL/L (ref 7–16)
BUN SERPL-MCNC: 15 MG/DL (ref 8–22)
CALCIUM SERPL-MCNC: 8.6 MG/DL (ref 8.5–10.5)
CHLORIDE SERPL-SCNC: 115 MMOL/L (ref 96–112)
CO2 SERPL-SCNC: 15 MMOL/L (ref 20–33)
CREAT SERPL-MCNC: 0.85 MG/DL (ref 0.5–1.4)
ERYTHROCYTE [DISTWIDTH] IN BLOOD BY AUTOMATED COUNT: 44.3 FL (ref 35.9–50)
GLUCOSE BLD-MCNC: 102 MG/DL (ref 65–99)
GLUCOSE BLD-MCNC: 124 MG/DL (ref 65–99)
GLUCOSE BLD-MCNC: 156 MG/DL (ref 65–99)
GLUCOSE BLD-MCNC: 88 MG/DL (ref 65–99)
GLUCOSE SERPL-MCNC: 94 MG/DL (ref 65–99)
HCT VFR BLD AUTO: 37.1 % (ref 37–47)
HGB BLD-MCNC: 12 G/DL (ref 12–16)
MCH RBC QN AUTO: 29.4 PG (ref 27–33)
MCHC RBC AUTO-ENTMCNC: 32.3 G/DL (ref 33.6–35)
MCV RBC AUTO: 90.9 FL (ref 81.4–97.8)
PLATELET # BLD AUTO: 154 K/UL (ref 164–446)
PMV BLD AUTO: 11.5 FL (ref 9–12.9)
POTASSIUM SERPL-SCNC: 3.5 MMOL/L (ref 3.6–5.5)
RBC # BLD AUTO: 4.08 M/UL (ref 4.2–5.4)
SODIUM SERPL-SCNC: 143 MMOL/L (ref 135–145)
WBC # BLD AUTO: 6 K/UL (ref 4.8–10.8)

## 2020-08-21 PROCEDURE — 700102 HCHG RX REV CODE 250 W/ 637 OVERRIDE(OP): Performed by: HOSPITALIST

## 2020-08-21 PROCEDURE — 36415 COLL VENOUS BLD VENIPUNCTURE: CPT

## 2020-08-21 PROCEDURE — 87186 SC STD MICRODIL/AGAR DIL: CPT

## 2020-08-21 PROCEDURE — 700111 HCHG RX REV CODE 636 W/ 250 OVERRIDE (IP): Performed by: HOSPITALIST

## 2020-08-21 PROCEDURE — 87205 SMEAR GRAM STAIN: CPT

## 2020-08-21 PROCEDURE — A9270 NON-COVERED ITEM OR SERVICE: HCPCS | Performed by: HOSPITALIST

## 2020-08-21 PROCEDURE — 82962 GLUCOSE BLOOD TEST: CPT

## 2020-08-21 PROCEDURE — 700101 HCHG RX REV CODE 250: Performed by: HOSPITALIST

## 2020-08-21 PROCEDURE — 700105 HCHG RX REV CODE 258: Performed by: HOSPITALIST

## 2020-08-21 PROCEDURE — 87086 URINE CULTURE/COLONY COUNT: CPT

## 2020-08-21 PROCEDURE — 99232 SBSQ HOSP IP/OBS MODERATE 35: CPT | Performed by: HOSPITALIST

## 2020-08-21 PROCEDURE — 94640 AIRWAY INHALATION TREATMENT: CPT

## 2020-08-21 PROCEDURE — A9270 NON-COVERED ITEM OR SERVICE: HCPCS | Performed by: INTERNAL MEDICINE

## 2020-08-21 PROCEDURE — 80048 BASIC METABOLIC PNL TOTAL CA: CPT

## 2020-08-21 PROCEDURE — 700111 HCHG RX REV CODE 636 W/ 250 OVERRIDE (IP): Performed by: INTERNAL MEDICINE

## 2020-08-21 PROCEDURE — 770001 HCHG ROOM/CARE - MED/SURG/GYN PRIV*

## 2020-08-21 PROCEDURE — 85027 COMPLETE CBC AUTOMATED: CPT

## 2020-08-21 PROCEDURE — 73110 X-RAY EXAM OF WRIST: CPT | Mod: RT

## 2020-08-21 PROCEDURE — 87077 CULTURE AEROBIC IDENTIFY: CPT

## 2020-08-21 PROCEDURE — 700102 HCHG RX REV CODE 250 W/ 637 OVERRIDE(OP): Performed by: INTERNAL MEDICINE

## 2020-08-21 RX ADMIN — PREGABALIN 300 MG: 150 CAPSULE ORAL at 05:31

## 2020-08-21 RX ADMIN — IVABRADINE 7.5 MG: 7.5 TABLET, FILM COATED ORAL at 05:34

## 2020-08-21 RX ADMIN — OXYCODONE HYDROCHLORIDE 5 MG: 5 TABLET ORAL at 19:47

## 2020-08-21 RX ADMIN — ALBUTEROL SULFATE 2 PUFF: 90 AEROSOL, METERED RESPIRATORY (INHALATION) at 04:20

## 2020-08-21 RX ADMIN — OXCARBAZEPINE 150 MG: 150 TABLET, FILM COATED ORAL at 16:54

## 2020-08-21 RX ADMIN — MINERAL OIL, PETROLATUM 1 APPLICATION: 425; 573 OINTMENT OPHTHALMIC at 00:55

## 2020-08-21 RX ADMIN — LEVOTHYROXINE SODIUM 75 MCG: 0.07 TABLET ORAL at 05:32

## 2020-08-21 RX ADMIN — TOPIRAMATE 50 MG: 25 TABLET, FILM COATED ORAL at 16:55

## 2020-08-21 RX ADMIN — ZIPRASIDONE HYDROCHLORIDE 40 MG: 40 CAPSULE ORAL at 16:54

## 2020-08-21 RX ADMIN — IBUPROFEN 600 MG: 600 TABLET, FILM COATED ORAL at 16:53

## 2020-08-21 RX ADMIN — DOCUSATE SODIUM 100 MG: 100 CAPSULE, LIQUID FILLED ORAL at 05:31

## 2020-08-21 RX ADMIN — GABAPENTIN 300 MG: 300 CAPSULE ORAL at 05:32

## 2020-08-21 RX ADMIN — OXYCODONE HYDROCHLORIDE 5 MG: 5 TABLET ORAL at 11:47

## 2020-08-21 RX ADMIN — ENOXAPARIN SODIUM 40 MG: 40 INJECTION SUBCUTANEOUS at 05:30

## 2020-08-21 RX ADMIN — PREDNISONE 10 MG: 10 TABLET ORAL at 05:31

## 2020-08-21 RX ADMIN — GABAPENTIN 300 MG: 300 CAPSULE ORAL at 11:47

## 2020-08-21 RX ADMIN — TRAZODONE HYDROCHLORIDE 100 MG: 100 TABLET ORAL at 19:48

## 2020-08-21 RX ADMIN — PREGABALIN 300 MG: 150 CAPSULE ORAL at 16:54

## 2020-08-21 RX ADMIN — ACETAZOLAMIDE 1000 MG: 500 CAPSULE, EXTENDED RELEASE ORAL at 16:54

## 2020-08-21 RX ADMIN — POTASSIUM CHLORIDE 20 MEQ: 20 TABLET, EXTENDED RELEASE ORAL at 11:47

## 2020-08-21 RX ADMIN — MYCOPHENOLATE MOFETIL 1000 MG: 250 CAPSULE ORAL at 16:53

## 2020-08-21 RX ADMIN — TOPIRAMATE 50 MG: 25 TABLET, FILM COATED ORAL at 05:36

## 2020-08-21 RX ADMIN — MINERAL OIL, PETROLATUM 1 APPLICATION: 425; 573 OINTMENT OPHTHALMIC at 11:49

## 2020-08-21 RX ADMIN — ACETAMINOPHEN 500 MG: 500 TABLET ORAL at 05:32

## 2020-08-21 RX ADMIN — ACETAZOLAMIDE 1500 MG: 500 CAPSULE, EXTENDED RELEASE ORAL at 05:34

## 2020-08-21 RX ADMIN — SODIUM BICARBONATE 650 MG: 650 TABLET ORAL at 14:48

## 2020-08-21 RX ADMIN — IVABRADINE 7.5 MG: 7.5 TABLET, FILM COATED ORAL at 16:54

## 2020-08-21 RX ADMIN — POTASSIUM CHLORIDE 20 MEQ: 20 TABLET, EXTENDED RELEASE ORAL at 16:55

## 2020-08-21 RX ADMIN — OXYCODONE HYDROCHLORIDE 5 MG: 5 TABLET ORAL at 03:43

## 2020-08-21 RX ADMIN — IBUPROFEN 600 MG: 600 TABLET, FILM COATED ORAL at 08:33

## 2020-08-21 RX ADMIN — MYCOPHENOLATE MOFETIL 1000 MG: 250 CAPSULE ORAL at 05:34

## 2020-08-21 RX ADMIN — BUSPIRONE HYDROCHLORIDE 10 MG: 10 TABLET ORAL at 22:06

## 2020-08-21 RX ADMIN — CEFTRIAXONE SODIUM 2 G: 2 INJECTION, POWDER, FOR SOLUTION INTRAMUSCULAR; INTRAVENOUS at 06:07

## 2020-08-21 RX ADMIN — SODIUM BICARBONATE 650 MG: 650 TABLET ORAL at 08:24

## 2020-08-21 RX ADMIN — BUSPIRONE HYDROCHLORIDE 10 MG: 10 TABLET ORAL at 08:24

## 2020-08-21 RX ADMIN — SODIUM BICARBONATE 650 MG: 650 TABLET ORAL at 19:48

## 2020-08-21 RX ADMIN — ZIPRASIDONE HYDROCHLORIDE 40 MG: 40 CAPSULE ORAL at 05:34

## 2020-08-21 RX ADMIN — OXCARBAZEPINE 150 MG: 150 TABLET, FILM COATED ORAL at 05:34

## 2020-08-21 RX ADMIN — SODIUM BICARBONATE 650 MG: 650 TABLET ORAL at 05:32

## 2020-08-21 RX ADMIN — POTASSIUM CHLORIDE 20 MEQ: 20 TABLET, EXTENDED RELEASE ORAL at 05:30

## 2020-08-21 RX ADMIN — MINERAL OIL, PETROLATUM 1 APPLICATION: 425; 573 OINTMENT OPHTHALMIC at 05:35

## 2020-08-21 RX ADMIN — DOCUSATE SODIUM 50 MG AND SENNOSIDES 8.6 MG 2 TABLET: 8.6; 5 TABLET, FILM COATED ORAL at 16:54

## 2020-08-21 RX ADMIN — MINERAL OIL, PETROLATUM 1 APPLICATION: 425; 573 OINTMENT OPHTHALMIC at 16:54

## 2020-08-21 RX ADMIN — DOCUSATE SODIUM 100 MG: 100 CAPSULE, LIQUID FILLED ORAL at 16:54

## 2020-08-21 RX ADMIN — ASPIRIN 81 MG: 81 TABLET, COATED ORAL at 05:32

## 2020-08-21 RX ADMIN — GABAPENTIN 300 MG: 300 CAPSULE ORAL at 16:55

## 2020-08-21 RX ADMIN — FLUOXETINE 40 MG: 20 CAPSULE ORAL at 05:32

## 2020-08-21 RX ADMIN — IPRATROPIUM BROMIDE AND ALBUTEROL SULFATE 3 ML: .5; 3 SOLUTION RESPIRATORY (INHALATION) at 04:48

## 2020-08-21 RX ADMIN — DOCUSATE SODIUM 50 MG AND SENNOSIDES 8.6 MG 2 TABLET: 8.6; 5 TABLET, FILM COATED ORAL at 05:31

## 2020-08-21 ASSESSMENT — ENCOUNTER SYMPTOMS
ORTHOPNEA: 0
SPUTUM PRODUCTION: 0
PALPITATIONS: 0
FOCAL WEAKNESS: 1
DIARRHEA: 0
TINGLING: 0
DIZZINESS: 0
HEARTBURN: 0
FEVER: 0
NECK PAIN: 0
ABDOMINAL PAIN: 1
NAUSEA: 0
BACK PAIN: 0
CHILLS: 0
HEADACHES: 0
CLAUDICATION: 0
MYALGIAS: 1
SENSORY CHANGE: 0
WEIGHT LOSS: 0
COUGH: 0
VOMITING: 0
HALLUCINATIONS: 0
DEPRESSION: 0
BLURRED VISION: 0
TREMORS: 0

## 2020-08-21 ASSESSMENT — LIFESTYLE VARIABLES: SUBSTANCE_ABUSE: 0

## 2020-08-21 NOTE — PROGRESS NOTES
Assumed pt care at 0715.  Received report from night RN.  Assessment completed.  Pt AAOx4 with delayed responses.  Pt complains of right wrist pain.  Medicated per MAR. Pt uses WC for transportation.  Suprapubic in place.  Bed in lowest position, locked, and bed alarm is on.  Pt calls appropriately.  Treaded socks in place, call light within reach and staff numbers provided.  Pt needs met at this time.

## 2020-08-21 NOTE — CONSULTS
DATE OF SERVICE:  08/18/2020    Dear Kevin,    The patient was evaluated in neurophthalmology clinic on a semi-urgent basis   on 8/18/2020.  The patient was admitted to [facility] for further   evaluation of transient bilateral visual loss.  You are familiar with her   history but let me briefly summarize.  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  Importantly, the patient has remained clinically stable on   Diamox 2500 mg p.o. daily; prednisone 10 mg p.o. daily; and CellCept 1000 mg   p.o. b.i.d.    The patient reports that on 8/13 she began to notice a dimming of her vision   OU, which lasts less than 1 minute.  Prior to the visual loss, the patient   will develop a throbbing headache.  On 8/14, the patient had 3 episodes of   bilateral visual loss, lasting for less than 30 seconds.  The visual loss was   unassociated with headache, paresthesia, difficulty with bowel or bladder   control and difficulty walking.  She denies decreased light and dark   adaptation.  The patient was admitted to the hospital and has had a CT   angiogram of the head and neck and echocardiogram at my request.    ALLERGIES:  CERTAIN ANTIBIOTICS, DEPAKOTE, AND AMOXICILLIN.    MEDICATIONS:  Diamox 2500 mg p.o. daily; prednisone 10 mg p.o. daily; CellCept   500 mg 2 p.o. b.i.d.; and aspirin 81 mg p.o. daily.    PAST MEDICAL HISTORY:  Type 2 diabetes mellitus, Hashimoto's thyroiditis,   CRION disease and idiopathic intracranial hypertension.    PAST SURGICAL HISTORY:  Status post placement of an InterStim pacemaker.  It   is noteworthy that patient has been scheduled for removal of the InterStim   pacemaker as soon as she gets clearance from cardiology (patient has history   of cardiac arrhythmia).    PAST FAMILY HISTORY:  Mother with a history of hypertension.   Father's history   is unremarkable.    SOCIAL HISTORY:  The patient does not smoke nor drinks.    REVIEW OF SYSTEMS:  The patient has a history of cardiac arrhythmia, chest   pain, tinnitus, arthritis, easy fatigability, shortness of breath, headaches,   thyroid disease and diabetes mellitus.    VISUAL ACUITY:  Best corrected visual acuity, 7/31/2020, OD 20/20, OS 20/20.    Best corrected visual acuity, 8/18/2020, OD 20/20, OS 20/30, near J2 and J5.    HRR PLATES:  5 and 2.    AMSLER:  There was a waviness with good markings, OU.    CONFRONTATION VISUAL FIELDS: Full normal.    PUPILS:  3.0.  There was a 0.3 log unit relative afferent pupillary defect,   OS.    FLICKER:  31 for both.    SLIT LAMP EXAMINATION:  There was a reduced tear film, especially OS.    TENSIONS:  19 and 17.    MOTILITY EXAMINATION:  Extended ocular motility examination was performed.    Primary gaze, 3 prism diopters of esotropia.  Right gaze, 3 prism diopters of   esotropia.  Left gaze is the same; upgaze same; downgaze same; all 3 prism   diopters of esotropia.  Near is 3 prism diopters of exotropia.    DILATED OPHTHALMOSCOPY:  OD:  Cup-to-disk ratio measured at 0.5  The right   optic nerve and macula appeared normal.  There was no evidence of retinal tear   or detachment.  OS:  The cup-to-disk ratio measured 0.45.  The right optic   nerve and macula appeared normal.  There was no evidence of a retinal tear or   detachment.    CHRISTINA VISUAL FIELD: 24-2.  OD:  The foveal threshold was reduced at 28 dB.    There was a mild superonasal step, otherwise normal.  OS:  The foveal   threshold normal at 35 dB, the visual field was normal.    OCT:  The mean retinal nerve fiber layer thickness was normal OD at 90 microns   and OS at 92 microns.  The macular thickness map was normal, OU.    CT scan of the brain without contrast was performed on 8/15/2020.  CT scan was   normal.    CT angiogram of the head, 8/17/2020, normal.    CT angiogram of the  neck, 8/17/2020, normal.    Echocardiogram pending.    Laboratory studies pending.    ASSESSMENT:  1. Recurrent short-lived transient monocular visual loss, lasting 30 seconds   up to 1 minute.  The differential diagnosis includes vasospastic retinal   artery migraine.  There is no clearcut evidence of cortical blindness   secondary to stroke.  It is unlikely that the patient is having change in   visual obscurations as there is no evidence of papilledema.  2. Clinically stable recurrent chronic relapsing inflammatory optic neuropathy   (CRION syndrome).  3. Retrobulbar acute optic neuritis, OS.  4. Remote retrobulbar optic neuritis, OD.  5. Clinically stable idiopathic intracranial hypertension, unassociated with   papilledema.  6. Closed head trauma, 7/25/2020.  7. Clinically-resolved tiny retinal hemorrhage, OD.  8. Glaucoma, suspect.  9. Small-angle esotropia.  10. Mild convergence insufficiency.  11. Remote history of paroxysmal atrial fibrillation.    RECOMMENDATIONS:  1. Conservative management.  2. Aspirin 81 mg p.o. daily.  3. Maintain the Medrol 10 mg p.o. daily.  4. Maintain the prednisone 10 mg p.o. daily.  5. Maintain the Diamox 2500 mg p.o. daily.  6. Maintain the CellCept 1000 mg p.o. b.i.d.  7. CBC and comprehensive metabolic panel q. 2 months.  8. Await the results of the echocardiogram as well as the remaining laboratory   studies.  9. Risks versus benefits.  The risks, benefits, alternatives and expectations   of above-mentioned therapy has been discussed at length with the patient.    Complex patient, time spent 40 minutes, history, physical examination, review   of neuroimaging studies, diagnosis, differential diagnosis and treatment   strategies.             ____________________________________     MARITZA GROVER, DO    [facility] / NTS    DD:  08/20/2020 17:59:34  DT:  08/20/2020 21:01:47    D#:  [id number]  Job#:  [id number]    cc: ERLINDA CALABRESE MD

## 2020-08-22 PROBLEM — B96.89 URINARY TRACT INFECTION DUE TO ESBL KLEBSIELLA: Status: ACTIVE | Noted: 2020-08-22

## 2020-08-22 PROBLEM — N39.0 URINARY TRACT INFECTION DUE TO ESBL KLEBSIELLA: Status: ACTIVE | Noted: 2020-08-22

## 2020-08-22 LAB
ANION GAP SERPL CALC-SCNC: 13 MMOL/L (ref 7–16)
BUN SERPL-MCNC: 16 MG/DL (ref 8–22)
CALCIUM SERPL-MCNC: 8.7 MG/DL (ref 8.5–10.5)
CHLORIDE SERPL-SCNC: 114 MMOL/L (ref 96–112)
CO2 SERPL-SCNC: 13 MMOL/L (ref 20–33)
CREAT SERPL-MCNC: 0.72 MG/DL (ref 0.5–1.4)
ERYTHROCYTE [DISTWIDTH] IN BLOOD BY AUTOMATED COUNT: 46.8 FL (ref 35.9–50)
F5 P.R506Q BLD/T QL: NEGATIVE
GLUCOSE BLD-MCNC: 103 MG/DL (ref 65–99)
GLUCOSE BLD-MCNC: 86 MG/DL (ref 65–99)
GLUCOSE BLD-MCNC: 86 MG/DL (ref 65–99)
GLUCOSE SERPL-MCNC: 89 MG/DL (ref 65–99)
HCT VFR BLD AUTO: 38.7 % (ref 37–47)
HGB BLD-MCNC: 11.8 G/DL (ref 12–16)
MCH RBC QN AUTO: 29.3 PG (ref 27–33)
MCHC RBC AUTO-ENTMCNC: 30.5 G/DL (ref 33.6–35)
MCV RBC AUTO: 96 FL (ref 81.4–97.8)
PLATELET # BLD AUTO: 148 K/UL (ref 164–446)
PMV BLD AUTO: 11.9 FL (ref 9–12.9)
POTASSIUM SERPL-SCNC: 4 MMOL/L (ref 3.6–5.5)
RBC # BLD AUTO: 4.03 M/UL (ref 4.2–5.4)
SODIUM SERPL-SCNC: 140 MMOL/L (ref 135–145)
WBC # BLD AUTO: 6.5 K/UL (ref 4.8–10.8)

## 2020-08-22 PROCEDURE — A9270 NON-COVERED ITEM OR SERVICE: HCPCS | Performed by: INTERNAL MEDICINE

## 2020-08-22 PROCEDURE — A9270 NON-COVERED ITEM OR SERVICE: HCPCS | Performed by: HOSPITALIST

## 2020-08-22 PROCEDURE — 700111 HCHG RX REV CODE 636 W/ 250 OVERRIDE (IP): Performed by: HOSPITALIST

## 2020-08-22 PROCEDURE — 99232 SBSQ HOSP IP/OBS MODERATE 35: CPT | Performed by: HOSPITALIST

## 2020-08-22 PROCEDURE — 36415 COLL VENOUS BLD VENIPUNCTURE: CPT

## 2020-08-22 PROCEDURE — 770009 HCHG ROOM/CARE - ONCOLOGY SEMI PRI*

## 2020-08-22 PROCEDURE — 700102 HCHG RX REV CODE 250 W/ 637 OVERRIDE(OP): Performed by: HOSPITALIST

## 2020-08-22 PROCEDURE — 700102 HCHG RX REV CODE 250 W/ 637 OVERRIDE(OP): Performed by: INTERNAL MEDICINE

## 2020-08-22 PROCEDURE — 700111 HCHG RX REV CODE 636 W/ 250 OVERRIDE (IP): Performed by: INTERNAL MEDICINE

## 2020-08-22 PROCEDURE — 82962 GLUCOSE BLOOD TEST: CPT | Mod: 91

## 2020-08-22 PROCEDURE — 85027 COMPLETE CBC AUTOMATED: CPT

## 2020-08-22 PROCEDURE — 80048 BASIC METABOLIC PNL TOTAL CA: CPT

## 2020-08-22 PROCEDURE — 700101 HCHG RX REV CODE 250: Performed by: HOSPITALIST

## 2020-08-22 PROCEDURE — 700105 HCHG RX REV CODE 258: Performed by: HOSPITALIST

## 2020-08-22 RX ADMIN — PREGABALIN 300 MG: 150 CAPSULE ORAL at 05:48

## 2020-08-22 RX ADMIN — MYCOPHENOLATE MOFETIL 1000 MG: 250 CAPSULE ORAL at 20:15

## 2020-08-22 RX ADMIN — OXYCODONE HYDROCHLORIDE 5 MG: 5 TABLET ORAL at 01:49

## 2020-08-22 RX ADMIN — IVABRADINE 7.5 MG: 7.5 TABLET, FILM COATED ORAL at 08:26

## 2020-08-22 RX ADMIN — PREDNISONE 10 MG: 10 TABLET ORAL at 05:50

## 2020-08-22 RX ADMIN — OXYCODONE HYDROCHLORIDE 5 MG: 5 TABLET ORAL at 22:48

## 2020-08-22 RX ADMIN — MEROPENEM 500 MG: 500 INJECTION, POWDER, FOR SOLUTION INTRAVENOUS at 23:53

## 2020-08-22 RX ADMIN — ACETAZOLAMIDE 1000 MG: 500 CAPSULE, EXTENDED RELEASE ORAL at 20:14

## 2020-08-22 RX ADMIN — MINERAL OIL, PETROLATUM 1 APPLICATION: 425; 573 OINTMENT OPHTHALMIC at 06:05

## 2020-08-22 RX ADMIN — BUSPIRONE HYDROCHLORIDE 10 MG: 10 TABLET ORAL at 20:16

## 2020-08-22 RX ADMIN — ACETAZOLAMIDE 1500 MG: 500 CAPSULE, EXTENDED RELEASE ORAL at 06:05

## 2020-08-22 RX ADMIN — DOCUSATE SODIUM 100 MG: 100 CAPSULE, LIQUID FILLED ORAL at 05:48

## 2020-08-22 RX ADMIN — GABAPENTIN 300 MG: 300 CAPSULE ORAL at 18:30

## 2020-08-22 RX ADMIN — TOPIRAMATE 50 MG: 25 TABLET, FILM COATED ORAL at 05:49

## 2020-08-22 RX ADMIN — MEROPENEM 500 MG: 500 INJECTION, POWDER, FOR SOLUTION INTRAVENOUS at 18:29

## 2020-08-22 RX ADMIN — ACETAMINOPHEN 500 MG: 500 TABLET ORAL at 20:19

## 2020-08-22 RX ADMIN — SODIUM BICARBONATE 650 MG: 650 TABLET ORAL at 01:49

## 2020-08-22 RX ADMIN — GABAPENTIN 300 MG: 300 CAPSULE ORAL at 05:48

## 2020-08-22 RX ADMIN — SODIUM BICARBONATE 650 MG: 650 TABLET ORAL at 14:45

## 2020-08-22 RX ADMIN — ZIPRASIDONE HYDROCHLORIDE 40 MG: 40 CAPSULE ORAL at 05:50

## 2020-08-22 RX ADMIN — SODIUM BICARBONATE 650 MG: 650 TABLET ORAL at 20:16

## 2020-08-22 RX ADMIN — MEROPENEM 500 MG: 500 INJECTION, POWDER, FOR SOLUTION INTRAVENOUS at 11:55

## 2020-08-22 RX ADMIN — TOPIRAMATE 50 MG: 25 TABLET, FILM COATED ORAL at 18:31

## 2020-08-22 RX ADMIN — PREGABALIN 300 MG: 150 CAPSULE ORAL at 18:30

## 2020-08-22 RX ADMIN — SODIUM BICARBONATE 650 MG: 650 TABLET ORAL at 08:26

## 2020-08-22 RX ADMIN — MINERAL OIL, PETROLATUM 1 APPLICATION: 425; 573 OINTMENT OPHTHALMIC at 20:16

## 2020-08-22 RX ADMIN — BUSPIRONE HYDROCHLORIDE 10 MG: 10 TABLET ORAL at 08:26

## 2020-08-22 RX ADMIN — IVABRADINE 7.5 MG: 7.5 TABLET, FILM COATED ORAL at 20:15

## 2020-08-22 RX ADMIN — MYCOPHENOLATE MOFETIL 1000 MG: 250 CAPSULE ORAL at 05:47

## 2020-08-22 RX ADMIN — OXCARBAZEPINE 150 MG: 150 TABLET, FILM COATED ORAL at 20:15

## 2020-08-22 RX ADMIN — TRAZODONE HYDROCHLORIDE 100 MG: 100 TABLET ORAL at 20:16

## 2020-08-22 RX ADMIN — LEVOTHYROXINE SODIUM 75 MCG: 0.07 TABLET ORAL at 05:48

## 2020-08-22 RX ADMIN — POTASSIUM CHLORIDE 20 MEQ: 20 TABLET, EXTENDED RELEASE ORAL at 18:31

## 2020-08-22 RX ADMIN — OXCARBAZEPINE 150 MG: 150 TABLET, FILM COATED ORAL at 06:05

## 2020-08-22 RX ADMIN — GABAPENTIN 300 MG: 300 CAPSULE ORAL at 11:55

## 2020-08-22 RX ADMIN — POTASSIUM CHLORIDE 20 MEQ: 20 TABLET, EXTENDED RELEASE ORAL at 05:48

## 2020-08-22 RX ADMIN — ENOXAPARIN SODIUM 40 MG: 40 INJECTION SUBCUTANEOUS at 05:51

## 2020-08-22 RX ADMIN — DOCUSATE SODIUM 100 MG: 100 CAPSULE, LIQUID FILLED ORAL at 18:30

## 2020-08-22 RX ADMIN — ZIPRASIDONE HYDROCHLORIDE 40 MG: 40 CAPSULE ORAL at 20:16

## 2020-08-22 RX ADMIN — OXYCODONE HYDROCHLORIDE 5 MG: 5 TABLET ORAL at 16:19

## 2020-08-22 RX ADMIN — DOCUSATE SODIUM 50 MG AND SENNOSIDES 8.6 MG 2 TABLET: 8.6; 5 TABLET, FILM COATED ORAL at 18:30

## 2020-08-22 RX ADMIN — OXYCODONE HYDROCHLORIDE 5 MG: 5 TABLET ORAL at 09:49

## 2020-08-22 RX ADMIN — FLUOXETINE 40 MG: 20 CAPSULE ORAL at 05:48

## 2020-08-22 RX ADMIN — MINERAL OIL, PETROLATUM 1 APPLICATION: 425; 573 OINTMENT OPHTHALMIC at 11:54

## 2020-08-22 RX ADMIN — ASPIRIN 81 MG: 81 TABLET, COATED ORAL at 05:48

## 2020-08-22 RX ADMIN — CEFTRIAXONE SODIUM 2 G: 2 INJECTION, POWDER, FOR SOLUTION INTRAMUSCULAR; INTRAVENOUS at 05:51

## 2020-08-22 RX ADMIN — MINERAL OIL, PETROLATUM 1 APPLICATION: 425; 573 OINTMENT OPHTHALMIC at 23:54

## 2020-08-22 RX ADMIN — POTASSIUM CHLORIDE 20 MEQ: 20 TABLET, EXTENDED RELEASE ORAL at 11:55

## 2020-08-22 ASSESSMENT — ENCOUNTER SYMPTOMS
VOMITING: 0
NAUSEA: 0
HEARTBURN: 0
FOCAL WEAKNESS: 1
DIARRHEA: 0
DEPRESSION: 0
NECK PAIN: 0
TINGLING: 0
COUGH: 0
DIZZINESS: 0
HALLUCINATIONS: 0
ORTHOPNEA: 0
BACK PAIN: 0
TREMORS: 0
SPUTUM PRODUCTION: 0
PALPITATIONS: 0
FEVER: 0
SENSORY CHANGE: 0
BLURRED VISION: 0
HEADACHES: 0
ABDOMINAL PAIN: 0
CLAUDICATION: 0
MYALGIAS: 1
CHILLS: 0
WEIGHT LOSS: 0

## 2020-08-22 ASSESSMENT — LIFESTYLE VARIABLES: SUBSTANCE_ABUSE: 0

## 2020-08-22 ASSESSMENT — COGNITIVE AND FUNCTIONAL STATUS - GENERAL
WALKING IN HOSPITAL ROOM: TOTAL
SUGGESTED CMS G CODE MODIFIER MOBILITY: CL
DAILY ACTIVITIY SCORE: 17
TOILETING: A LOT
TURNING FROM BACK TO SIDE WHILE IN FLAT BAD: A LITTLE
MOBILITY SCORE: 12
SUGGESTED CMS G CODE MODIFIER DAILY ACTIVITY: CK
HELP NEEDED FOR BATHING: A LOT
DRESSING REGULAR UPPER BODY CLOTHING: A LITTLE
CLIMB 3 TO 5 STEPS WITH RAILING: TOTAL
DRESSING REGULAR LOWER BODY CLOTHING: A LOT
MOVING TO AND FROM BED TO CHAIR: A LITTLE
MOVING FROM LYING ON BACK TO SITTING ON SIDE OF FLAT BED: A LOT
STANDING UP FROM CHAIR USING ARMS: A LOT

## 2020-08-22 NOTE — DISCHARGE PLANNING
Anticipated Discharge Disposition:   SNF to LTC    Action:    Pt with UTI, meropenem IV through 8-.  Has a suprapubic cath.      Uses a FWW at baseline.      Grandmother is primary care giver and she is no longer able to care for patient.     PT/OT recommending post-acute placement.      [place] choice forms faxed to [facility] for [facility] and [facility].    Barriers to Discharge:    SNF acceptance    Plan:    F/U referrals.

## 2020-08-22 NOTE — PROGRESS NOTES
[facility] Medicine Daily Progress Note    Date of Service  8/22/2020    Chief Complaint  29 y.o. female admitted 8/15/2020 with blurry vision      Interval Problem Update    No new complaints today    Urine cx shows ESBL klebsiella      Consultants/Specialty  Eye md    Code Status  Full Code    Disposition  SNF referral in process    Review of Systems  Review of Systems   Constitutional: Negative for chills, fever and weight loss.   HENT: Negative for ear discharge, ear pain and hearing loss.    Eyes: Negative for blurred vision.   Respiratory: Negative for cough and sputum production.    Cardiovascular: Negative for chest pain, palpitations, orthopnea and claudication.   Gastrointestinal: Negative for abdominal pain, diarrhea, heartburn, nausea and vomiting.   Genitourinary: Negative for dysuria, frequency and urgency.   Musculoskeletal: Positive for joint pain (right wrist) and myalgias. Negative for back pain and neck pain.   Neurological: Positive for focal weakness. Negative for dizziness, tingling, tremors, sensory change and headaches.   Psychiatric/Behavioral: Negative for depression, hallucinations, substance abuse and suicidal ideas.        Physical Exam  Temp:  [35.8 °C (96.5 °F)-36.7 °C (98 °F)] 36.1 °C (97 °F)  Pulse:  [73-75] 75  Resp:  [16] 16  BP: (107-129)/(56-75) 116/56  SpO2:  [94 %-96 %] 96 %    Physical Exam  Constitutional:       Appearance: She is obese. She is ill-appearing.   HENT:      Head: Normocephalic and atraumatic.      Mouth/Throat:      Mouth: Mucous membranes are moist.   Eyes:      General: No scleral icterus.     Extraocular Movements: Extraocular movements intact.      Pupils: Pupils are equal, round, and reactive to light.   Neck:      Musculoskeletal: Normal range of motion and neck supple.   Cardiovascular:      Rate and Rhythm: Normal rate and regular rhythm.      Heart sounds: No murmur. No friction rub. No gallop.    Pulmonary:      Effort: Pulmonary effort is normal. No  respiratory distress.      Breath sounds: No stridor. No wheezing or rhonchi.   Chest:      Chest wall: No tenderness.   Abdominal:      General: There is distension.      Palpations: There is no mass.      Tenderness: There is no abdominal tenderness. There is no guarding or rebound.      Hernia: No hernia is present.      Comments: suprapubic cath- no significant redness or swelling   Musculoskeletal: Normal range of motion.         General: No swelling, tenderness, deformity or signs of injury.      Right lower leg: No edema.   Skin:     General: Skin is warm.      Coloration: Skin is not jaundiced or pale.      Findings: No bruising, erythema, lesion or rash.   Neurological:      Mental Status: She is alert and oriented to person, place, and time.      Comments: B/l leg weakness    B/l foot droop   Psychiatric:         Mood and Affect: Mood normal.         Fluids    Intake/Output Summary (Last 24 hours) at 8/22/2020 1033  Last data filed at 8/22/2020 0600  Gross per 24 hour   Intake --   Output 550 ml   Net -550 ml       Laboratory  Recent Labs     08/20/20 0322 08/21/20  0315 08/22/20  0139   WBC 6.7 6.0 6.5   RBC 4.09* 4.08* 4.03*   HEMOGLOBIN 12.0 12.0 11.8*   HEMATOCRIT 36.9* 37.1 38.7   MCV 90.2 90.9 96.0   MCH 29.3 29.4 29.3   MCHC 32.5* 32.3* 30.5*   RDW 42.8 44.3 46.8   PLATELETCT 165 154* 148*   MPV 11.6 11.5 11.9     Recent Labs     08/20/20  0322 08/21/20  0315 08/22/20  0139   SODIUM 141 143 140   POTASSIUM 2.9* 3.5* 4.0   CHLORIDE 111 115* 114*   CO2 16* 15* 13*   GLUCOSE 88 94 89   BUN 16 15 16   CREATININE 0.77 0.85 0.72   CALCIUM 8.9 8.6 8.7                   Imaging  DX-WRIST-COMPLETE 3+ RIGHT   Final Result      No acute fracture.  If pain persists, recommend repeat imaging in 7 to 10 days.      EC-ECHOCARDIOGRAM COMPLETE W/ CONT   Final Result      CT-CTA NECK WITH & W/O-POST PROCESSING   Final Result      No high-grade stenosis, large vessel occlusion, aneurysm or dissection.      CT-CTA  HEAD WITH & W/O-POST PROCESS   Final Result      No intracranial aneurysm, focal stenosis, or abrupt large vessel cut off.      US-CAROTID DOPPLER BILAT   Final Result      CT-HEAD W/O   Final Result      No CT evidence of acute infarct, hemorrhage or mass.           Assessment/Plan  Optic neuritis- (present on admission)  Assessment & Plan  Continue outpatient prednisone   ophthalmology consulted, Dr. Aparicio.. signed off  continue home Cellcept     cta head and neck--WNL    Echo.  Within normal limits    hypercoag w./u.. Unremarkable except for a mild elevation of homocystine            Hypokalemia- (present on admission)  Assessment & Plan  Last replaced on August 20, 2020    Diabetes mellitus type 2 in obese (HCC)- (present on admission)  Assessment & Plan  Continue outpatient regiment with regular insulin sliding scale.    Insulin dependent  Follow-up A1c is 5    Morbidly obese (HCC)- (present on admission)  Assessment & Plan  BMI 42    Wrist injury, right, initial encounter  Assessment & Plan  Xray negative for fracture    Pain control    Dry eyes- (present on admission)  Assessment & Plan  Eye lubrication     Pain in both feet- (present on admission)  Assessment & Plan  Pain control with po oxycodone        Idiopathic intracranial hypertension- (present on admission)  Assessment & Plan  History of  She is followed by Dr. 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)  Assessment & Plan  Stable at baseline nonambulatory state,    Blurred vision  Assessment & Plan  Presumed retinopathy  monitor    Presence of suprapubic catheter (HCC)- (present on admission)  Assessment & Plan  Infected    Too early to change catheter    Urinary tract infection associated with catheterization of urinary tract ([facility])- (present on admission)  Assessment & Plan  ESBL klebsiella--> IV merrem x 7 days    Schizophrenia (HCC)- (present on admission)  Assessment & Plan  Reported  history of   Continue Geodon and Prozac    Peripheral neuropathy and chornic pain syndrome (CMS-HCC)- (present on admission)  Assessment & Plan  At risk of morbid obesity hypoventilation, avoid excessive medications limiting respiratory drive.       VTE prophylaxis: scd    Transfer to neuro    A.m.  BMP, cbc

## 2020-08-22 NOTE — CARE PLAN
Problem: Safety  Goal: Will remain free from injury  Outcome: PROGRESSING AS EXPECTED  Note: Fall precautions in place. Bed in lowest position. Non-skid socks in place. Personal possessions within reach. Mobility sign on door. Bed-alarm on. Call light within reach. Pt educated regarding fall prevention and states understanding.    Goal: Will remain free from falls  Outcome: PROGRESSING AS EXPECTED  Note: Fall precautions in place. Bed in lowest position. Non-skid socks in place. Personal possessions within reach. Mobility sign on door. Bed-alarm on. Call light within reach. Pt educated regarding fall prevention and states understanding.       Problem: Knowledge Deficit  Goal: Knowledge of disease process/condition, treatment plan, diagnostic tests, and medications will improve  Outcome: PROGRESSING AS EXPECTED  Note: Pt educated regarding plan of care and medications. All questions answered.    Goal: Knowledge of the prescribed therapeutic regimen will improve  Outcome: PROGRESSING AS EXPECTED  Note: Pt educated regarding plan of care and medications. All questions answered.

## 2020-08-22 NOTE — DISCHARGE PLANNING
Received Choice form at [phone]  Agency/Facility Name: Tati Eaton, Eleni Cortes, Dez Cortes, You Hernadez, Flavia Davis [facility], [facility]  Referral sent per Choice form @ [phone]

## 2020-08-22 NOTE — PROGRESS NOTES
"[facility] Medicine Daily Progress Note    Date of Service  8/21/2020    Chief Complaint  29 y.o. female admitted 8/15/2020 with blurry vision      Interval Problem Update    Patient c/o blurry vision again, as per eye md- nothing more to do    Patient c/o swlling at suprapubic cath site    Pt c/o pain on right wrist--> she states she injured it \" thrashing around\"      Referred to snf  Treating UTI      Low potassium 3.5    Consultants/Specialty  Eye md    Code Status  Full Code    Disposition  snf referral in process   Review of Systems  Review of Systems   Constitutional: Negative for chills, fever and weight loss.   HENT: Negative for ear discharge, ear pain and hearing loss.    Eyes: Negative for blurred vision.   Respiratory: Negative for cough and sputum production.    Cardiovascular: Negative for chest pain, palpitations, orthopnea and claudication.   Gastrointestinal: Positive for abdominal pain. Negative for diarrhea, heartburn, nausea and vomiting.   Genitourinary: Negative for dysuria, frequency and urgency.   Musculoskeletal: Positive for joint pain (right wrist) and myalgias. Negative for back pain and neck pain.   Neurological: Positive for focal weakness. Negative for dizziness, tingling, tremors, sensory change and headaches.   Psychiatric/Behavioral: Negative for depression, hallucinations, substance abuse and suicidal ideas.        Physical Exam  Temp:  [35.9 °C (96.7 °F)-36.8 °C (98.2 °F)] 36.7 °C (98 °F)  Pulse:  [72-75] 75  Resp:  [16-18] 16  BP: (104-129)/(61-75) 129/75  SpO2:  [95 %-99 %] 96 %    Physical Exam  Constitutional:       Appearance: She is obese. She is ill-appearing.   HENT:      Head: Normocephalic and atraumatic.      Mouth/Throat:      Mouth: Mucous membranes are moist.   Eyes:      General: No scleral icterus.     Extraocular Movements: Extraocular movements intact.      Pupils: Pupils are equal, round, and reactive to light.   Neck:      Musculoskeletal: Normal range of motion " and neck supple.   Cardiovascular:      Rate and Rhythm: Normal rate and regular rhythm.      Heart sounds: No murmur. No friction rub. No gallop.    Pulmonary:      Effort: Pulmonary effort is normal. No respiratory distress.      Breath sounds: No stridor. No wheezing or rhonchi.   Chest:      Chest wall: No tenderness.   Abdominal:      General: There is distension.      Palpations: There is no mass.      Tenderness: There is no abdominal tenderness. There is no guarding or rebound.      Hernia: No hernia is present.      Comments: suprapubic cath- no significant redness or swelling   Musculoskeletal: Normal range of motion.         General: No swelling, tenderness, deformity or signs of injury.      Right lower leg: No edema.   Skin:     General: Skin is warm.      Coloration: Skin is not jaundiced or pale.      Findings: No bruising, erythema, lesion or rash.   Neurological:      Mental Status: She is alert and oriented to person, place, and time.      Comments: B/l leg weakness    B/l foot droop   Psychiatric:         Mood and Affect: Mood normal.         Fluids  No intake or output data in the 24 hours ending 08/21/20 1930    Laboratory  Recent Labs     08/20/20  0322 08/21/20  0315   WBC 6.7 6.0   RBC 4.09* 4.08*   HEMOGLOBIN 12.0 12.0   HEMATOCRIT 36.9* 37.1   MCV 90.2 90.9   MCH 29.3 29.4   MCHC 32.5* 32.3*   RDW 42.8 44.3   PLATELETCT 165 154*   MPV 11.6 11.5     Recent Labs     08/20/20  0322 08/21/20  0315   SODIUM 141 143   POTASSIUM 2.9* 3.5*   CHLORIDE 111 115*   CO2 16* 15*   GLUCOSE 88 94   BUN 16 15   CREATININE 0.77 0.85   CALCIUM 8.9 8.6                   Imaging  DX-WRIST-COMPLETE 3+ RIGHT   Final Result      No acute fracture.  If pain persists, recommend repeat imaging in 7 to 10 days.      EC-ECHOCARDIOGRAM COMPLETE W/ CONT   Final Result      CT-CTA NECK WITH & W/O-POST PROCESSING   Final Result      No high-grade stenosis, large vessel occlusion, aneurysm or dissection.      CT-CTA HEAD  WITH & W/O-POST PROCESS   Final Result      No intracranial aneurysm, focal stenosis, or abrupt large vessel cut off.      US-CAROTID DOPPLER BILAT   Final Result      CT-HEAD W/O   Final Result      No CT evidence of acute infarct, hemorrhage or mass.           Assessment/Plan  Optic neuritis- (present on admission)  Assessment & Plan  Continue outpatient prednisone   ophthalmology consulted, Dr. Aparicio.. signed off  continue home Cellcept     cta head and neck--WNL    Echo.  Within normal limits    hypercoag w./u.. Unremarkable except for a mild elevation of homocystine            Hypokalemia- (present on admission)  Assessment & Plan  Last replaced on August 20, 2020    Diabetes mellitus type 2 in obese (HCC)- (present on admission)  Assessment & Plan  Continue outpatient regiment with regular insulin sliding scale.    Insulin dependent  Follow-up A1c is 5    Morbidly obese (HCC)- (present on admission)  Assessment & Plan  BMI 42    Wrist injury, right, initial encounter  Assessment & Plan  Xray negative for fracture    Pain control    Dry eyes- (present on admission)  Assessment & Plan  Eye lubrication     Pain in both feet- (present on admission)  Assessment & Plan  Pain control with po oxycodone        Idiopathic intracranial hypertension- (present on admission)  Assessment & Plan  History of  She is followed by Dr. 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    Blurred vision  Assessment & Plan  Presumed retinopathy, f/u ophthalmology consult, carotid Doppler ultrasound ordered    Presence of suprapubic catheter (HCC)- (present on admission)  Assessment & Plan  Infected    Too early to change catheter    Urinary tract infection associated with catheterization of urinary tract (HCC)-  (present on admission)  Assessment & Plan  Iv rocephin    Check urine cx    Schizophrenia (HCC)- (present on admission)  Assessment & Plan  Reported history of   Continue Geodon and Prozac    Peripheral neuropathy and chornic pain syndrome (CMS-HCC)- (present on admission)  Assessment & Plan  At risk of morbid obesity hypoventilation, avoid excessive medications limiting respiratory drive.       VTE prophylaxis: scd    Transfer to neuro    A.m.  BMP, cbc

## 2020-08-22 NOTE — PROGRESS NOTES
Assumed pt care at 0715.  Received report from radhames RN.  Assessment completed.  Pt AAOx4.  Pt complains of discomfort around suprapubic cath.  Urine cx's pending.  No other s/s of discomfort or distress. Pt is WC bound. Treaded socks in place, call light within reach and staff numbers provided.  Pt needs met at this time.

## 2020-08-22 NOTE — PROGRESS NOTES
Pt report received. Pt resting in bed at this time. Bed locked and in low position. Call light within reach.

## 2020-08-22 NOTE — ASSESSMENT & PLAN NOTE
Received 7 days of meropenem  8/30 -  Complains of urethra burning. Will trial pyridium and recheck UA  8/31 - UA neg for WBC and bacteria

## 2020-08-23 ENCOUNTER — APPOINTMENT (OUTPATIENT)
Dept: RADIOLOGY | Facility: MEDICAL CENTER | Age: 30
DRG: 123 | End: 2020-08-23
Attending: NURSE PRACTITIONER
Payer: MEDICARE

## 2020-08-23 LAB
ANION GAP SERPL CALC-SCNC: 15 MMOL/L (ref 7–16)
BUN SERPL-MCNC: 12 MG/DL (ref 8–22)
CALCIUM SERPL-MCNC: 8.6 MG/DL (ref 8.5–10.5)
CHLORIDE SERPL-SCNC: 113 MMOL/L (ref 96–112)
CO2 SERPL-SCNC: 13 MMOL/L (ref 20–33)
CREAT SERPL-MCNC: 0.71 MG/DL (ref 0.5–1.4)
ERYTHROCYTE [DISTWIDTH] IN BLOOD BY AUTOMATED COUNT: 45.7 FL (ref 35.9–50)
GLUCOSE BLD-MCNC: 106 MG/DL (ref 65–99)
GLUCOSE BLD-MCNC: 77 MG/DL (ref 65–99)
GLUCOSE BLD-MCNC: 80 MG/DL (ref 65–99)
GLUCOSE BLD-MCNC: 83 MG/DL (ref 65–99)
GLUCOSE BLD-MCNC: 83 MG/DL (ref 65–99)
GLUCOSE SERPL-MCNC: 83 MG/DL (ref 65–99)
GRAM STN SPEC: NORMAL
HCT VFR BLD AUTO: 38.2 % (ref 37–47)
HGB BLD-MCNC: 12 G/DL (ref 12–16)
MCH RBC QN AUTO: 29.2 PG (ref 27–33)
MCHC RBC AUTO-ENTMCNC: 31.4 G/DL (ref 33.6–35)
MCV RBC AUTO: 92.9 FL (ref 81.4–97.8)
PLATELET # BLD AUTO: 154 K/UL (ref 164–446)
PMV BLD AUTO: 11.8 FL (ref 9–12.9)
POTASSIUM SERPL-SCNC: 3.8 MMOL/L (ref 3.6–5.5)
RBC # BLD AUTO: 4.11 M/UL (ref 4.2–5.4)
SIGNIFICANT IND 70042: NORMAL
SITE SITE: NORMAL
SODIUM SERPL-SCNC: 141 MMOL/L (ref 135–145)
SOURCE SOURCE: NORMAL
WBC # BLD AUTO: 5.4 K/UL (ref 4.8–10.8)

## 2020-08-23 PROCEDURE — 36415 COLL VENOUS BLD VENIPUNCTURE: CPT

## 2020-08-23 PROCEDURE — 700102 HCHG RX REV CODE 250 W/ 637 OVERRIDE(OP): Performed by: HOSPITALIST

## 2020-08-23 PROCEDURE — 94760 N-INVAS EAR/PLS OXIMETRY 1: CPT

## 2020-08-23 PROCEDURE — 82962 GLUCOSE BLOOD TEST: CPT

## 2020-08-23 PROCEDURE — 700111 HCHG RX REV CODE 636 W/ 250 OVERRIDE (IP): Performed by: HOSPITALIST

## 2020-08-23 PROCEDURE — A9270 NON-COVERED ITEM OR SERVICE: HCPCS | Performed by: HOSPITALIST

## 2020-08-23 PROCEDURE — 700102 HCHG RX REV CODE 250 W/ 637 OVERRIDE(OP): Performed by: NURSE PRACTITIONER

## 2020-08-23 PROCEDURE — 770009 HCHG ROOM/CARE - ONCOLOGY SEMI PRI*

## 2020-08-23 PROCEDURE — 700111 HCHG RX REV CODE 636 W/ 250 OVERRIDE (IP): Performed by: INTERNAL MEDICINE

## 2020-08-23 PROCEDURE — A9270 NON-COVERED ITEM OR SERVICE: HCPCS | Performed by: INTERNAL MEDICINE

## 2020-08-23 PROCEDURE — 80048 BASIC METABOLIC PNL TOTAL CA: CPT

## 2020-08-23 PROCEDURE — 700101 HCHG RX REV CODE 250: Performed by: HOSPITALIST

## 2020-08-23 PROCEDURE — 94640 AIRWAY INHALATION TREATMENT: CPT

## 2020-08-23 PROCEDURE — 85027 COMPLETE CBC AUTOMATED: CPT

## 2020-08-23 PROCEDURE — 700105 HCHG RX REV CODE 258: Performed by: HOSPITALIST

## 2020-08-23 PROCEDURE — A9270 NON-COVERED ITEM OR SERVICE: HCPCS | Performed by: NURSE PRACTITIONER

## 2020-08-23 PROCEDURE — 99233 SBSQ HOSP IP/OBS HIGH 50: CPT | Performed by: HOSPITALIST

## 2020-08-23 PROCEDURE — 70450 CT HEAD/BRAIN W/O DYE: CPT

## 2020-08-23 PROCEDURE — 700102 HCHG RX REV CODE 250 W/ 637 OVERRIDE(OP): Performed by: INTERNAL MEDICINE

## 2020-08-23 RX ORDER — OXYCODONE HYDROCHLORIDE 5 MG/1
5-10 TABLET ORAL EVERY 4 HOURS PRN
Status: DISCONTINUED | OUTPATIENT
Start: 2020-08-23 | End: 2020-09-05

## 2020-08-23 RX ORDER — BUTALBITAL, ACETAMINOPHEN AND CAFFEINE 50; 325; 40 MG/1; MG/1; MG/1
1-2 TABLET ORAL EVERY 6 HOURS PRN
Status: DISCONTINUED | OUTPATIENT
Start: 2020-08-23 | End: 2020-09-14 | Stop reason: HOSPADM

## 2020-08-23 RX ADMIN — OXCARBAZEPINE 150 MG: 150 TABLET, FILM COATED ORAL at 16:17

## 2020-08-23 RX ADMIN — BUSPIRONE HYDROCHLORIDE 10 MG: 10 TABLET ORAL at 08:22

## 2020-08-23 RX ADMIN — IPRATROPIUM BROMIDE AND ALBUTEROL SULFATE 3 ML: .5; 3 SOLUTION RESPIRATORY (INHALATION) at 07:42

## 2020-08-23 RX ADMIN — SODIUM BICARBONATE 650 MG: 650 TABLET ORAL at 21:45

## 2020-08-23 RX ADMIN — DOCUSATE SODIUM 100 MG: 100 CAPSULE, LIQUID FILLED ORAL at 06:01

## 2020-08-23 RX ADMIN — ASPIRIN 81 MG: 81 TABLET, COATED ORAL at 06:01

## 2020-08-23 RX ADMIN — BUTALBITAL, ACETAMINOPHEN, AND CAFFEINE 2 TABLET: 50; 325; 40 TABLET ORAL at 12:35

## 2020-08-23 RX ADMIN — PREGABALIN 300 MG: 150 CAPSULE ORAL at 06:01

## 2020-08-23 RX ADMIN — IPRATROPIUM BROMIDE AND ALBUTEROL SULFATE 3 ML: .5; 3 SOLUTION RESPIRATORY (INHALATION) at 19:44

## 2020-08-23 RX ADMIN — BUSPIRONE HYDROCHLORIDE 10 MG: 10 TABLET ORAL at 21:42

## 2020-08-23 RX ADMIN — SODIUM BICARBONATE 650 MG: 650 TABLET ORAL at 02:54

## 2020-08-23 RX ADMIN — POTASSIUM CHLORIDE 20 MEQ: 20 TABLET, EXTENDED RELEASE ORAL at 17:29

## 2020-08-23 RX ADMIN — OXCARBAZEPINE 150 MG: 150 TABLET, FILM COATED ORAL at 06:04

## 2020-08-23 RX ADMIN — PREDNISONE 10 MG: 10 TABLET ORAL at 06:02

## 2020-08-23 RX ADMIN — MEROPENEM 500 MG: 500 INJECTION, POWDER, FOR SOLUTION INTRAVENOUS at 12:54

## 2020-08-23 RX ADMIN — DOCUSATE SODIUM 50 MG AND SENNOSIDES 8.6 MG 2 TABLET: 8.6; 5 TABLET, FILM COATED ORAL at 06:01

## 2020-08-23 RX ADMIN — FLUOXETINE 40 MG: 20 CAPSULE ORAL at 06:01

## 2020-08-23 RX ADMIN — GABAPENTIN 300 MG: 300 CAPSULE ORAL at 12:51

## 2020-08-23 RX ADMIN — ZIPRASIDONE HYDROCHLORIDE 40 MG: 40 CAPSULE ORAL at 19:18

## 2020-08-23 RX ADMIN — OXYCODONE HYDROCHLORIDE 10 MG: 5 TABLET ORAL at 21:42

## 2020-08-23 RX ADMIN — ALBUTEROL SULFATE 2 PUFF: 90 AEROSOL, METERED RESPIRATORY (INHALATION) at 05:18

## 2020-08-23 RX ADMIN — OXYCODONE HYDROCHLORIDE 10 MG: 5 TABLET ORAL at 16:29

## 2020-08-23 RX ADMIN — TRAZODONE HYDROCHLORIDE 100 MG: 100 TABLET ORAL at 21:42

## 2020-08-23 RX ADMIN — ACETAZOLAMIDE 1500 MG: 500 CAPSULE, EXTENDED RELEASE ORAL at 08:20

## 2020-08-23 RX ADMIN — OXYCODONE HYDROCHLORIDE 10 MG: 5 TABLET ORAL at 06:58

## 2020-08-23 RX ADMIN — ZIPRASIDONE HYDROCHLORIDE 40 MG: 40 CAPSULE ORAL at 06:05

## 2020-08-23 RX ADMIN — SODIUM BICARBONATE 650 MG: 650 TABLET ORAL at 08:19

## 2020-08-23 RX ADMIN — GABAPENTIN 300 MG: 300 CAPSULE ORAL at 06:01

## 2020-08-23 RX ADMIN — MYCOPHENOLATE MOFETIL 1000 MG: 250 CAPSULE ORAL at 17:29

## 2020-08-23 RX ADMIN — OXYCODONE HYDROCHLORIDE 10 MG: 5 TABLET ORAL at 02:54

## 2020-08-23 RX ADMIN — DOCUSATE SODIUM 100 MG: 100 CAPSULE, LIQUID FILLED ORAL at 17:29

## 2020-08-23 RX ADMIN — MINERAL OIL, PETROLATUM 1 APPLICATION: 425; 573 OINTMENT OPHTHALMIC at 12:53

## 2020-08-23 RX ADMIN — INSULIN GLARGINE 12 UNITS: 100 INJECTION, SOLUTION SUBCUTANEOUS at 17:38

## 2020-08-23 RX ADMIN — LEVOTHYROXINE SODIUM 75 MCG: 0.07 TABLET ORAL at 06:07

## 2020-08-23 RX ADMIN — MEROPENEM 500 MG: 500 INJECTION, POWDER, FOR SOLUTION INTRAVENOUS at 17:28

## 2020-08-23 RX ADMIN — GABAPENTIN 300 MG: 300 CAPSULE ORAL at 17:30

## 2020-08-23 RX ADMIN — TOPIRAMATE 50 MG: 25 TABLET, FILM COATED ORAL at 17:30

## 2020-08-23 RX ADMIN — MINERAL OIL, PETROLATUM 1 APPLICATION: 425; 573 OINTMENT OPHTHALMIC at 06:04

## 2020-08-23 RX ADMIN — MEROPENEM 500 MG: 500 INJECTION, POWDER, FOR SOLUTION INTRAVENOUS at 06:01

## 2020-08-23 RX ADMIN — MICONAZOLE NITRATE: 20 CREAM TOPICAL at 17:32

## 2020-08-23 RX ADMIN — POTASSIUM CHLORIDE 20 MEQ: 20 TABLET, EXTENDED RELEASE ORAL at 06:02

## 2020-08-23 RX ADMIN — TOPIRAMATE 50 MG: 25 TABLET, FILM COATED ORAL at 06:01

## 2020-08-23 RX ADMIN — MINERAL OIL, PETROLATUM 1 APPLICATION: 425; 573 OINTMENT OPHTHALMIC at 17:29

## 2020-08-23 RX ADMIN — IVABRADINE 7.5 MG: 7.5 TABLET, FILM COATED ORAL at 00:00

## 2020-08-23 RX ADMIN — IVABRADINE 7.5 MG: 7.5 TABLET, FILM COATED ORAL at 17:30

## 2020-08-23 RX ADMIN — PREGABALIN 300 MG: 150 CAPSULE ORAL at 19:18

## 2020-08-23 RX ADMIN — ENOXAPARIN SODIUM 40 MG: 40 INJECTION SUBCUTANEOUS at 06:15

## 2020-08-23 RX ADMIN — ACETAZOLAMIDE 1000 MG: 500 CAPSULE, EXTENDED RELEASE ORAL at 17:28

## 2020-08-23 RX ADMIN — SODIUM BICARBONATE 650 MG: 650 TABLET ORAL at 15:00

## 2020-08-23 RX ADMIN — POTASSIUM CHLORIDE 20 MEQ: 20 TABLET, EXTENDED RELEASE ORAL at 12:51

## 2020-08-23 RX ADMIN — DOCUSATE SODIUM 50 MG AND SENNOSIDES 8.6 MG 2 TABLET: 8.6; 5 TABLET, FILM COATED ORAL at 17:29

## 2020-08-23 RX ADMIN — MYCOPHENOLATE MOFETIL 1000 MG: 250 CAPSULE ORAL at 06:05

## 2020-08-23 ASSESSMENT — ENCOUNTER SYMPTOMS
NERVOUS/ANXIOUS: 1
NECK PAIN: 0
CHILLS: 0
ABDOMINAL PAIN: 0
WEIGHT LOSS: 0
VOMITING: 0
COUGH: 0
TINGLING: 0
FEVER: 0
BACK PAIN: 0
SENSORY CHANGE: 0
BLURRED VISION: 1
DEPRESSION: 0
SPUTUM PRODUCTION: 0
DIZZINESS: 0
ORTHOPNEA: 0
TREMORS: 0
DIARRHEA: 0
NAUSEA: 0
HEADACHES: 1
FOCAL WEAKNESS: 1
HALLUCINATIONS: 0
CLAUDICATION: 0
PALPITATIONS: 0
MYALGIAS: 1
HEARTBURN: 0

## 2020-08-23 ASSESSMENT — LIFESTYLE VARIABLES: SUBSTANCE_ABUSE: 0

## 2020-08-23 NOTE — CARE PLAN
Problem: Pain Management  Goal: Pain level will decrease to patient's comfort goal  Outcome: PROGRESSING AS EXPECTED     Problem: Skin Integrity  Goal: Risk for impaired skin integrity will decrease  Outcome: PROGRESSING AS EXPECTED  Intervention: Assess risk factors for impaired skin integrity and/or pressure ulcers  Note: 2 RN skin check completed. Wound consult placed. Barrier Cream.

## 2020-08-23 NOTE — PROGRESS NOTES
Pt arrived to unit. Assessment meets expectations at this time. Pt medications missing, calling [facility] to have them send it over.

## 2020-08-23 NOTE — PROGRESS NOTES
Pt alert and oriented x 4 this shift, cooperative with staff and compliant with plan of care, able to move all extremities and turn self in bed, suprapubic catheter patent and intact, IV antibiotics this shift, pt reported HA given dose of Fioricet and feeling better.    Diet GI soft since per pt request VS stable afebrile. Frequent rounding in place, will continue to monitor and suppot

## 2020-08-23 NOTE — WOUND TEAM
Wound team consulted for left breast and patient ask wound team to see buttock.  Buttock has moisture related fungal pin point rash, and the left breast exhibits the similar rash with itchiness.  Ordered antifungal cream for buttock and left breast.  No advanced wound care needs at this time, wound team signing off.  Please re-consult if needed.

## 2020-08-23 NOTE — PROGRESS NOTES
2 RN skin check complete.   - L breast has small red sores/ wounds. Underneath breast red/pink (wound consult placed)  - Bilateral heels dry and calloused but blanching  - Bilateral elbows dry and blanching     Devices in place: Ace wraps to R wrist and bilateral ankles  Skin assessed under devices No (States she has fractures to both ankles & R wrsit)  Confirmed pressure ulcers found on NA.  New potential pressure ulcers noted on NA. Wound consult placed Yes.  The following interventions in place:  - Pillows in use for support/positioning  - Barrier cream   - Repositions self

## 2020-08-23 NOTE — CARE PLAN
Problem: Communication  Goal: The ability to communicate needs accurately and effectively will improve  Outcome: PROGRESSING AS EXPECTED   Plan of care discussed with patient, intended effects and side effects of medication in use, questions and concerns addressed      Problem: Safety  Goal: Will remain free from injury  Outcome: PROGRESSING AS EXPECTED   Fall precautions in place, pt free from falls this shift

## 2020-08-23 NOTE — PROGRESS NOTES
[facility] Medicine Daily Progress Note    Date of Service  8/23/2020    Chief Complaint  29 y.o. female admitted 8/15/2020 with blurry vision  Patient with a rather complicated and complex medical history including paroxysmal atrial fibrillation, IDDM, transverse myelitis, recurrent chronic relapsing inflammatory optic neuropathy, idiopathic intracranial hypertension.  Per admitting team the patient unable to return home as patient's grandmother does not appear to be able to care for her at this time.  The patient had a suprapubic catheter placed recently.    Interval Problem Update  Patient seen and examined today.    Patient tolerating treatment and therapies.  All Data, Medication data reviewed.  Case discussed with nursing as available.  Plan of Care reviewed with patient and notified of changes.  8/23 the patient with a variety of complaints, suprapubic catheter pain, headaches, fatigue, lethargy, patient is afebrile with stable vital signs at this time, on room air, laboratory data from today is overall benign, glycemic control is adequate.  The patient remains on meropenem for ESBL Klebsiella UTI.  Consultants/Specialty  Neuro ophthalmology    Code Status  Full Code    Disposition  SNF referral in process    Review of Systems  Review of Systems   Constitutional: Positive for malaise/fatigue. Negative for chills, fever and weight loss.   HENT: Negative for ear discharge, ear pain and hearing loss.    Eyes: Positive for blurred vision.   Respiratory: Negative for cough and sputum production.    Cardiovascular: Negative for chest pain, palpitations, orthopnea and claudication.   Gastrointestinal: Negative for abdominal pain, diarrhea, heartburn, nausea and vomiting.   Genitourinary: Negative for dysuria, frequency and urgency.        Suprapubic catheter pain   Musculoskeletal: Positive for joint pain (right wrist) and myalgias. Negative for back pain and neck pain.   Neurological: Positive for focal weakness and  headaches. Negative for dizziness, tingling, tremors and sensory change.   Psychiatric/Behavioral: Negative for depression, hallucinations, substance abuse and suicidal ideas. The patient is nervous/anxious.         Physical Exam  Temp:  [36.1 °C (97 °F)-36.5 °C (97.7 °F)] 36.3 °C (97.4 °F)  Pulse:  [64-82] 70  Resp:  [17-20] 20  BP: (104-123)/(61-71) 107/65  SpO2:  [95 %-99 %] 97 %    Physical Exam  Constitutional:       Appearance: She is obese. She is ill-appearing.   HENT:      Head: Normocephalic and atraumatic.      Mouth/Throat:      Mouth: Mucous membranes are moist.   Eyes:      General: No scleral icterus.     Extraocular Movements: Extraocular movements intact.      Pupils: Pupils are equal, round, and reactive to light.   Neck:      Musculoskeletal: Normal range of motion and neck supple.   Cardiovascular:      Rate and Rhythm: Normal rate and regular rhythm.      Heart sounds: No murmur. No friction rub. No gallop.    Pulmonary:      Effort: Pulmonary effort is normal. No respiratory distress.      Breath sounds: No stridor. No wheezing or rhonchi.   Chest:      Chest wall: No tenderness.   Abdominal:      General: There is distension.      Palpations: There is no mass.      Tenderness: There is no abdominal tenderness. There is no guarding or rebound.      Hernia: No hernia is present.      Comments: suprapubic cath- no significant redness or swelling   Musculoskeletal: Normal range of motion.         General: No swelling, tenderness, deformity or signs of injury.      Right lower leg: No edema.   Skin:     General: Skin is warm.      Coloration: Skin is not jaundiced or pale.      Findings: No bruising, erythema, lesion or rash.   Neurological:      Mental Status: She is alert and oriented to person, place, and time.      Comments: B/l leg weakness    B/l foot droop   Psychiatric:         Mood and Affect: Mood normal.         Fluids    Intake/Output Summary (Last 24 hours) at 8/23/2020 1440  Last data  filed at 8/23/2020 0600  Gross per 24 hour   Intake --   Output 1800 ml   Net -1800 ml       Laboratory  Recent Labs     08/21/20 0315 08/22/20 0139 08/23/20  0011   WBC 6.0 6.5 5.4   RBC 4.08* 4.03* 4.11*   HEMOGLOBIN 12.0 11.8* 12.0   HEMATOCRIT 37.1 38.7 38.2   MCV 90.9 96.0 92.9   MCH 29.4 29.3 29.2   MCHC 32.3* 30.5* 31.4*   RDW 44.3 46.8 45.7   PLATELETCT 154* 148* 154*   MPV 11.5 11.9 11.8     Recent Labs     08/21/20 0315 08/22/20 0139 08/23/20  0011   SODIUM 143 140 141   POTASSIUM 3.5* 4.0 3.8   CHLORIDE 115* 114* 113*   CO2 15* 13* 13*   GLUCOSE 94 89 83   BUN 15 16 12   CREATININE 0.85 0.72 0.71   CALCIUM 8.6 8.7 8.6                   Imaging  DX-WRIST-COMPLETE 3+ RIGHT   Final Result      No acute fracture.  If pain persists, recommend repeat imaging in 7 to 10 days.      EC-ECHOCARDIOGRAM COMPLETE W/ CONT   Final Result      CT-CTA NECK WITH & W/O-POST PROCESSING   Final Result      No high-grade stenosis, large vessel occlusion, aneurysm or dissection.      CT-CTA HEAD WITH & W/O-POST PROCESS   Final Result      No intracranial aneurysm, focal stenosis, or abrupt large vessel cut off.      US-CAROTID DOPPLER BILAT   Final Result      CT-HEAD W/O   Final Result      No CT evidence of acute infarct, hemorrhage or mass.           Assessment/Plan  Optic neuritis- (present on admission)  Assessment & Plan  Continue outpatient prednisone   ophthalmology consulted, Dr. Aparicio.. signed off  continue home Cellcept     cta head and neck--WNL    Echo.  Within normal limits    hypercoag w./u.. Unremarkable except for a mild elevation of homocystine            Hypokalemia- (present on admission)  Assessment & Plan  Monitor and replace    Diabetes mellitus type 2 in obese (HCC)- (present on admission)  Assessment & Plan  Continue outpatient regiment with regular insulin sliding scale.    Insulin dependent  Follow-up A1c is 5    Morbidly obese (HCC)- (present on admission)  Assessment & Plan  BMI 42    Urinary  tract infection due to ESBL Klebsiella- (present on admission)  Assessment & Plan  Continue full course of meropenem    Wrist injury, right, initial encounter  Assessment & Plan  Xray negative for fracture    Pain control    Dry eyes- (present on admission)  Assessment & Plan  Eye lubrication     Pain in both feet- (present on admission)  Assessment & Plan  Pain control with po oxycodone        Idiopathic intracranial hypertension- (present on admission)  Assessment & Plan  History of  She is followed by Dr. Yousif, neuro-ophthalmology   continue home Diamox  Supportive care as suggested by neuro-ophthalmology    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HCC)- (present on admission)  Assessment & Plan  Stable at baseline nonambulatory state,    Blurred vision  Assessment & Plan  Presumed retinopathy  monitor    Presence of suprapubic catheter (HCC)- (present on admission)  Assessment & Plan  Infected    Too early to change catheter    Urinary tract infection associated with catheterization of urinary tract (HCC)- (present on admission)  Assessment & Plan  ESBL klebsiella--> IV merrem x 7 days    Schizophrenia (HCC)- (present on admission)  Assessment & Plan  Reported history of   Continue Geodon and Prozac    Peripheral neuropathy and chornic pain syndrome (CMS-HCC)- (present on admission)  Assessment & Plan  At risk of morbid obesity hypoventilation, avoid excessive medications limiting respiratory drive.  Plan  Slight adjustment of pain regimen  Continue with current medication regimen and follow closely  Continue IV meropenem  Question of disposition  Medically complex high risk    VTE prophylaxis: scd    I have performed a physical exam and reviewed and updated ROS and Plan today . In review of yesterday's note , there are no changes except as documented above.        Please note that this dictation was created using voice recognition software. I have made every reasonable attempt to correct obvious errors, but I expect that there  are errors of grammar and possibly context that I did not discover before finalizing the note.

## 2020-08-24 ENCOUNTER — APPOINTMENT (OUTPATIENT)
Dept: RADIOLOGY | Facility: MEDICAL CENTER | Age: 30
DRG: 123 | End: 2020-08-24
Attending: HOSPITALIST
Payer: MEDICARE

## 2020-08-24 LAB
ANION GAP SERPL CALC-SCNC: 12 MMOL/L (ref 7–16)
BACTERIA UR CULT: ABNORMAL
BASOPHILS # BLD AUTO: 0.6 % (ref 0–1.8)
BASOPHILS # BLD: 0.04 K/UL (ref 0–0.12)
BUN SERPL-MCNC: 11 MG/DL (ref 8–22)
CALCIUM SERPL-MCNC: 8.7 MG/DL (ref 8.5–10.5)
CHLORIDE SERPL-SCNC: 115 MMOL/L (ref 96–112)
CO2 SERPL-SCNC: 14 MMOL/L (ref 20–33)
CREAT SERPL-MCNC: 0.79 MG/DL (ref 0.5–1.4)
CRP SERPL HS-MCNC: 0.29 MG/DL (ref 0–0.75)
EOSINOPHIL # BLD AUTO: 0.17 K/UL (ref 0–0.51)
EOSINOPHIL NFR BLD: 2.3 % (ref 0–6.9)
ERYTHROCYTE [DISTWIDTH] IN BLOOD BY AUTOMATED COUNT: 45 FL (ref 35.9–50)
GLUCOSE BLD-MCNC: 110 MG/DL (ref 65–99)
GLUCOSE BLD-MCNC: 75 MG/DL (ref 65–99)
GLUCOSE BLD-MCNC: 75 MG/DL (ref 65–99)
GLUCOSE BLD-MCNC: 85 MG/DL (ref 65–99)
GLUCOSE BLD-MCNC: 89 MG/DL (ref 65–99)
GLUCOSE BLD-MCNC: 97 MG/DL (ref 65–99)
GLUCOSE SERPL-MCNC: 100 MG/DL (ref 65–99)
GRAM STN SPEC: ABNORMAL
HCT VFR BLD AUTO: 38.3 % (ref 37–47)
HGB BLD-MCNC: 11.9 G/DL (ref 12–16)
IMM GRANULOCYTES # BLD AUTO: 0.04 K/UL (ref 0–0.11)
IMM GRANULOCYTES NFR BLD AUTO: 0.6 % (ref 0–0.9)
LYMPHOCYTES # BLD AUTO: 1.8 K/UL (ref 1–4.8)
LYMPHOCYTES NFR BLD: 24.8 % (ref 22–41)
MAGNESIUM SERPL-MCNC: 2.1 MG/DL (ref 1.5–2.5)
MCH RBC QN AUTO: 29.3 PG (ref 27–33)
MCHC RBC AUTO-ENTMCNC: 31.1 G/DL (ref 33.6–35)
MCV RBC AUTO: 94.3 FL (ref 81.4–97.8)
MONOCYTES # BLD AUTO: 0.47 K/UL (ref 0–0.85)
MONOCYTES NFR BLD AUTO: 6.5 % (ref 0–13.4)
NEUTROPHILS # BLD AUTO: 4.74 K/UL (ref 2–7.15)
NEUTROPHILS NFR BLD: 65.2 % (ref 44–72)
NRBC # BLD AUTO: 0 K/UL
NRBC BLD-RTO: 0 /100 WBC
PHOSPHATE SERPL-MCNC: 3 MG/DL (ref 2.5–4.5)
PLATELET # BLD AUTO: 120 K/UL (ref 164–446)
PMV BLD AUTO: 12 FL (ref 9–12.9)
POTASSIUM SERPL-SCNC: 3.7 MMOL/L (ref 3.6–5.5)
RBC # BLD AUTO: 4.06 M/UL (ref 4.2–5.4)
SIGNIFICANT IND 70042: ABNORMAL
SITE SITE: ABNORMAL
SODIUM SERPL-SCNC: 141 MMOL/L (ref 135–145)
SOURCE SOURCE: ABNORMAL
WBC # BLD AUTO: 7.3 K/UL (ref 4.8–10.8)

## 2020-08-24 PROCEDURE — 76857 US EXAM PELVIC LIMITED: CPT

## 2020-08-24 PROCEDURE — 700111 HCHG RX REV CODE 636 W/ 250 OVERRIDE (IP): Performed by: HOSPITALIST

## 2020-08-24 PROCEDURE — 36415 COLL VENOUS BLD VENIPUNCTURE: CPT

## 2020-08-24 PROCEDURE — 700102 HCHG RX REV CODE 250 W/ 637 OVERRIDE(OP): Performed by: HOSPITALIST

## 2020-08-24 PROCEDURE — 84100 ASSAY OF PHOSPHORUS: CPT

## 2020-08-24 PROCEDURE — 80048 BASIC METABOLIC PNL TOTAL CA: CPT

## 2020-08-24 PROCEDURE — 86140 C-REACTIVE PROTEIN: CPT

## 2020-08-24 PROCEDURE — A9270 NON-COVERED ITEM OR SERVICE: HCPCS | Performed by: HOSPITALIST

## 2020-08-24 PROCEDURE — 99232 SBSQ HOSP IP/OBS MODERATE 35: CPT | Performed by: HOSPITALIST

## 2020-08-24 PROCEDURE — 97530 THERAPEUTIC ACTIVITIES: CPT

## 2020-08-24 PROCEDURE — 770009 HCHG ROOM/CARE - ONCOLOGY SEMI PRI*

## 2020-08-24 PROCEDURE — 700102 HCHG RX REV CODE 250 W/ 637 OVERRIDE(OP): Performed by: NURSE PRACTITIONER

## 2020-08-24 PROCEDURE — 700102 HCHG RX REV CODE 250 W/ 637 OVERRIDE(OP): Performed by: INTERNAL MEDICINE

## 2020-08-24 PROCEDURE — 83735 ASSAY OF MAGNESIUM: CPT

## 2020-08-24 PROCEDURE — 97110 THERAPEUTIC EXERCISES: CPT

## 2020-08-24 PROCEDURE — 85025 COMPLETE CBC W/AUTO DIFF WBC: CPT

## 2020-08-24 PROCEDURE — A9270 NON-COVERED ITEM OR SERVICE: HCPCS | Performed by: INTERNAL MEDICINE

## 2020-08-24 PROCEDURE — 700111 HCHG RX REV CODE 636 W/ 250 OVERRIDE (IP): Performed by: INTERNAL MEDICINE

## 2020-08-24 PROCEDURE — 700105 HCHG RX REV CODE 258: Performed by: HOSPITALIST

## 2020-08-24 PROCEDURE — A9270 NON-COVERED ITEM OR SERVICE: HCPCS | Performed by: NURSE PRACTITIONER

## 2020-08-24 PROCEDURE — 82962 GLUCOSE BLOOD TEST: CPT | Mod: 91

## 2020-08-24 RX ORDER — INSULIN GLARGINE 100 [IU]/ML
8 INJECTION, SOLUTION SUBCUTANEOUS EVERY EVENING
Status: DISCONTINUED | OUTPATIENT
Start: 2020-08-24 | End: 2020-09-14 | Stop reason: HOSPADM

## 2020-08-24 RX ADMIN — OXYCODONE HYDROCHLORIDE 10 MG: 5 TABLET ORAL at 03:15

## 2020-08-24 RX ADMIN — PREGABALIN 300 MG: 150 CAPSULE ORAL at 21:13

## 2020-08-24 RX ADMIN — DOCUSATE SODIUM 100 MG: 100 CAPSULE, LIQUID FILLED ORAL at 06:12

## 2020-08-24 RX ADMIN — MINERAL OIL, PETROLATUM 1 APPLICATION: 425; 573 OINTMENT OPHTHALMIC at 17:37

## 2020-08-24 RX ADMIN — SODIUM BICARBONATE 650 MG: 650 TABLET ORAL at 21:16

## 2020-08-24 RX ADMIN — GABAPENTIN 300 MG: 300 CAPSULE ORAL at 06:11

## 2020-08-24 RX ADMIN — SODIUM BICARBONATE 650 MG: 650 TABLET ORAL at 08:20

## 2020-08-24 RX ADMIN — OXCARBAZEPINE 150 MG: 150 TABLET, FILM COATED ORAL at 06:11

## 2020-08-24 RX ADMIN — MICONAZOLE NITRATE: 20 CREAM TOPICAL at 17:37

## 2020-08-24 RX ADMIN — GABAPENTIN 300 MG: 300 CAPSULE ORAL at 11:05

## 2020-08-24 RX ADMIN — MINERAL OIL, PETROLATUM 1 APPLICATION: 425; 573 OINTMENT OPHTHALMIC at 12:33

## 2020-08-24 RX ADMIN — POTASSIUM CHLORIDE 20 MEQ: 20 TABLET, EXTENDED RELEASE ORAL at 06:12

## 2020-08-24 RX ADMIN — MICONAZOLE NITRATE: 20 CREAM TOPICAL at 06:07

## 2020-08-24 RX ADMIN — TOPIRAMATE 50 MG: 25 TABLET, FILM COATED ORAL at 06:11

## 2020-08-24 RX ADMIN — MEROPENEM 500 MG: 500 INJECTION, POWDER, FOR SOLUTION INTRAVENOUS at 06:05

## 2020-08-24 RX ADMIN — ZIPRASIDONE HYDROCHLORIDE 40 MG: 40 CAPSULE ORAL at 06:12

## 2020-08-24 RX ADMIN — MEROPENEM 500 MG: 500 INJECTION, POWDER, FOR SOLUTION INTRAVENOUS at 17:37

## 2020-08-24 RX ADMIN — TOPIRAMATE 50 MG: 25 TABLET, FILM COATED ORAL at 17:37

## 2020-08-24 RX ADMIN — MINERAL OIL, PETROLATUM 1 APPLICATION: 425; 573 OINTMENT OPHTHALMIC at 00:49

## 2020-08-24 RX ADMIN — GABAPENTIN 300 MG: 300 CAPSULE ORAL at 17:37

## 2020-08-24 RX ADMIN — IVABRADINE 7.5 MG: 7.5 TABLET, FILM COATED ORAL at 08:21

## 2020-08-24 RX ADMIN — POTASSIUM CHLORIDE 20 MEQ: 20 TABLET, EXTENDED RELEASE ORAL at 17:37

## 2020-08-24 RX ADMIN — MINERAL OIL, PETROLATUM 1 APPLICATION: 425; 573 OINTMENT OPHTHALMIC at 06:07

## 2020-08-24 RX ADMIN — ACETAZOLAMIDE 1500 MG: 500 CAPSULE, EXTENDED RELEASE ORAL at 06:11

## 2020-08-24 RX ADMIN — TRAZODONE HYDROCHLORIDE 100 MG: 100 TABLET ORAL at 21:13

## 2020-08-24 RX ADMIN — LEVOTHYROXINE SODIUM 75 MCG: 0.07 TABLET ORAL at 06:12

## 2020-08-24 RX ADMIN — ENOXAPARIN SODIUM 40 MG: 40 INJECTION SUBCUTANEOUS at 06:06

## 2020-08-24 RX ADMIN — MYCOPHENOLATE MOFETIL 1000 MG: 250 CAPSULE ORAL at 17:36

## 2020-08-24 RX ADMIN — OXYCODONE HYDROCHLORIDE 10 MG: 5 TABLET ORAL at 13:29

## 2020-08-24 RX ADMIN — PREGABALIN 300 MG: 150 CAPSULE ORAL at 09:40

## 2020-08-24 RX ADMIN — IVABRADINE 7.5 MG: 7.5 TABLET, FILM COATED ORAL at 17:37

## 2020-08-24 RX ADMIN — ZIPRASIDONE HYDROCHLORIDE 40 MG: 40 CAPSULE ORAL at 17:37

## 2020-08-24 RX ADMIN — OXYCODONE HYDROCHLORIDE 10 MG: 5 TABLET ORAL at 21:13

## 2020-08-24 RX ADMIN — PREDNISONE 10 MG: 10 TABLET ORAL at 06:12

## 2020-08-24 RX ADMIN — POTASSIUM CHLORIDE 20 MEQ: 20 TABLET, EXTENDED RELEASE ORAL at 11:05

## 2020-08-24 RX ADMIN — MEROPENEM 500 MG: 500 INJECTION, POWDER, FOR SOLUTION INTRAVENOUS at 11:05

## 2020-08-24 RX ADMIN — OXCARBAZEPINE 150 MG: 150 TABLET, FILM COATED ORAL at 17:37

## 2020-08-24 RX ADMIN — IBUPROFEN 600 MG: 600 TABLET, FILM COATED ORAL at 11:05

## 2020-08-24 RX ADMIN — BUSPIRONE HYDROCHLORIDE 10 MG: 10 TABLET ORAL at 21:13

## 2020-08-24 RX ADMIN — MEROPENEM 500 MG: 500 INJECTION, POWDER, FOR SOLUTION INTRAVENOUS at 00:49

## 2020-08-24 RX ADMIN — DOCUSATE SODIUM 50 MG AND SENNOSIDES 8.6 MG 2 TABLET: 8.6; 5 TABLET, FILM COATED ORAL at 06:16

## 2020-08-24 RX ADMIN — SODIUM BICARBONATE 650 MG: 650 TABLET ORAL at 16:24

## 2020-08-24 RX ADMIN — ACETAZOLAMIDE 1000 MG: 500 CAPSULE, EXTENDED RELEASE ORAL at 17:36

## 2020-08-24 RX ADMIN — ASPIRIN 81 MG: 81 TABLET, COATED ORAL at 06:12

## 2020-08-24 RX ADMIN — INSULIN GLARGINE 8 UNITS: 100 INJECTION, SOLUTION SUBCUTANEOUS at 18:58

## 2020-08-24 RX ADMIN — MYCOPHENOLATE MOFETIL 1000 MG: 250 CAPSULE ORAL at 06:11

## 2020-08-24 RX ADMIN — FLUOXETINE 40 MG: 20 CAPSULE ORAL at 06:12

## 2020-08-24 RX ADMIN — SODIUM BICARBONATE 650 MG: 650 TABLET ORAL at 03:15

## 2020-08-24 RX ADMIN — BUSPIRONE HYDROCHLORIDE 10 MG: 10 TABLET ORAL at 08:20

## 2020-08-24 ASSESSMENT — ENCOUNTER SYMPTOMS
HEADACHES: 1
BLURRED VISION: 1
TINGLING: 0
VOMITING: 0
DIZZINESS: 0
TREMORS: 0
CHILLS: 0
DIARRHEA: 0
SPUTUM PRODUCTION: 0
HALLUCINATIONS: 0
MYALGIAS: 1
FOCAL WEAKNESS: 1
SENSORY CHANGE: 0
FEVER: 0
NAUSEA: 0
ORTHOPNEA: 0
HEARTBURN: 0
NECK PAIN: 0
ABDOMINAL PAIN: 0
BACK PAIN: 0
COUGH: 0
WEIGHT LOSS: 0
NERVOUS/ANXIOUS: 1
CLAUDICATION: 0
DEPRESSION: 0
PALPITATIONS: 0

## 2020-08-24 ASSESSMENT — GAIT ASSESSMENTS: GAIT LEVEL OF ASSIST: UNABLE TO PARTICIPATE

## 2020-08-24 ASSESSMENT — COGNITIVE AND FUNCTIONAL STATUS - GENERAL
STANDING UP FROM CHAIR USING ARMS: A LITTLE
MOVING FROM LYING ON BACK TO SITTING ON SIDE OF FLAT BED: UNABLE
TURNING FROM BACK TO SIDE WHILE IN FLAT BAD: A LITTLE
WALKING IN HOSPITAL ROOM: TOTAL
CLIMB 3 TO 5 STEPS WITH RAILING: TOTAL
MOVING TO AND FROM BED TO CHAIR: A LITTLE
SUGGESTED CMS G CODE MODIFIER MOBILITY: CL
MOBILITY SCORE: 12

## 2020-08-24 ASSESSMENT — LIFESTYLE VARIABLES: SUBSTANCE_ABUSE: 0

## 2020-08-24 NOTE — PROGRESS NOTES
"Patient had an unassisted/unwitnessed fall at 2030. Per RN Tuesday, patient called to  w/call light and stated, \"I'm on the floor.\" This RN was in room shortly after. Several staff assisted patient back to bed. Patient stated \"legs began twitching uncontrollably and launched me out of bed.\" Patient indicated they did hit head during fall, stating \"I scraped the front of my face.\" Neuro assessment baseline for patient - pupils round and reactive, baseline blurry vision per patient, oriented x4. During fall, suprapubic catheter became partially dislodged and patient c/o 10/10 pain at site.     This RN verified during shift change report that bed was locked and in lowest position, frame alarm active and sounding, 3 bed rails up and padded r/t seizure precautions ordered, fall wrist band in place, clutter free environment, and all other fall precautions in place.     MD Martínez paged - no head CT ordered at this time, okay to give evening Trazodone, okay to give AM aspirin and lovenox as long as no active bleeding or s/sx, stated to advance suprapubic catheter back in place. Per policy, RN unable to advance suprapubic catheter back into place.    Patient now c/o worsening headache since fall - Paged MD again - MD Alston ordered head CT, came to bedside and assessed catheter, no orders for tonight.     Charge RN aware of fall. Post Fall Documentation Checklist completed. Post Fall Huddle Checklist completed. Patient requested their grandmother be family member notified - spoke with patient's grandmother who states patient \"has had several falls recently at home.\"    Patient placed in new bed w/bed alarm and frame alarm active and sounding, bed locked and in lowest position, and all other fall precautions continued.   "

## 2020-08-24 NOTE — THERAPY
Physical Therapy   Daily Treatment     Patient Name: Kristin Balderrama  Age:  29 y.o., Sex:  female  Medical Record #: [id number]  Today's Date: 8/24/2020     Precautions: (P) Fall Risk    Assessment    Pt seen for PT treatment today focusing on bed mobility and slideboard transfers. Upon arrival, pt is complaining of pain; however, location of pain and quantity is unclear. Pt appears to be a poor historian 2/2 cognition. Pt demonstrating fair bed mobility as she requires min A to transfer to sitting EOB. While sitting, pt demonstrates fair balance and ability to weight shift to set up slideboard underneath her hips. Pt required min A for slideboard transfer to wheelchair, particularly with blocking/guarding pt from the front and assisting with set up. However, pt demonstrated good ability to scoot and negotiate slideboard. Pt appearing to have adequate strength to perform stand pivot transfer to wheelchair; however, pt endorsing slideboarrd transfer d/t recent hx of B ankle fractures. Per chart review, no WB precautions noted. Clarification on pt's status needed. Pt demonstrating improvements in bed mobility with today's treatment. PT to continue to follow up to improve functional mobility while in house prior to DC.       Plan    Continue current treatment plan.    DC Equipment Recommendations: Unable to determine at this time  Discharge Recommendations: Recommend post-acute placement for additional physical therapy services prior to discharge home       Objective       08/24/20 1152   Cognition    Level of Consciousness Alert   Comments Pt known to therapy department; is typically self limiting and attention seeking   Passive ROM Lower Body   Passive ROM Lower Body WDL   Active ROM Lower Body    Gross Active ROM Gross Active Range of Motion Impaired,  but Appears Adequate for Functional Mobility.   Strength Lower Body   Gross Strength Generalized Weakness, Equal Bilaterally   Comments B DF/PF not tested d/t pain;  per pt, she reports to have B ankle fractures   Sensation Lower Body   Lower Extremity Sensation   WDL   Sitting Lower Body Exercises   Sitting Lower Body Exercises Yes  (seated knee extensions and glute squeezes)   Balance   Sitting Balance (Static) Good   Sitting Balance (Dynamic) Fair +   Standing Balance (Static) Poor +   Standing Balance (Dynamic) Poor   Comments per pt, pt has B ankle fracture and is supposed to be WB for transfers only; shows good weight shifting in sitting   Gait Analysis   Gait Level Of Assist Unable to Participate   Weight Bearing Status no WB protocol found in chart despite pt reporting hx B ankle fractures   Bed Mobility    Supine to Sit Minimal Assist   Sit to Supine   (pt mobilized to WC at end of session)   Scooting Supervised   Rolling Supervised   Functional Mobility   Bed, Chair, Wheelchair Transfer Minimal Assist  (proper positioning of slideboard and guarding of pt)   Mobility slideboard transfer to    Comments appears to be more capable than subjective report   Short Term Goals    Short Term Goal # 1 Pt will be supervision level for bed mobility including supine to/from sit by the 3rd PTvisit    Goal Outcome # 1 Progressing as expected   Short Term Goal # 2 Pt will be Min A for slide board transfers including set up by the 3rd PT visit.    Goal Outcome # 2 Goal met, new goal added   Short Term Goal #2B Pt will perform SB transfer with SPV within 6 visits to [place] home with her Grandmother.    Short Term Goal # 3 Pt will self propel WC for 20 ft with BUE's by the 3rd PT visit    Goal Outcome # 3 Progressing slower than expected   Anticipated Discharge Equipment and Recommendations   DC Equipment Recommendations Unable to determine at this time   Discharge Recommendations Recommend post-acute placement for additional physical therapy services prior to discharge home

## 2020-08-24 NOTE — PROGRESS NOTES
Scan of the head ok'd by Radiologist. Patient dose will be reported to the RSO. Patient has had 98 CT scans, 156 xrays, 18 IR procedures, 4 Nuclear medicine studies, and 1 DEXA scan per Epic.

## 2020-08-24 NOTE — CARE PLAN
Problem: Venous Thromboembolism (VTW)/Deep Vein Thrombosis (DVT) Prevention:  Goal: Patient will participate in Venous Thrombosis (VTE)/Deep Vein Thrombosis (DVT)Prevention Measures  Outcome: PROGRESSING AS EXPECTED  Note: Patient on prophylactic Lovenox.      Problem: Safety  Goal: Will remain free from falls  Outcome: PROGRESSING SLOWER THAN EXPECTED  Note: Patient fell during this shift - frame alarm was active but did not sound, all other fall precautions in place.

## 2020-08-24 NOTE — PROGRESS NOTES
[facility] Medicine Daily Progress Note    Date of Service  8/24/2020    Chief Complaint  29 y.o. female admitted 8/15/2020 with blurry vision  Patient with a rather complicated and complex medical history including paroxysmal atrial fibrillation, IDDM, transverse myelitis, recurrent chronic relapsing inflammatory optic neuropathy, idiopathic intracranial hypertension.  Per admitting team the patient unable to return home as patient's grandmother does not appear to be able to care for her at this time.  The patient had a suprapubic catheter placed recently.    Interval Problem Update  Patient seen and examined today.    Patient tolerating treatment and therapies.  All Data, Medication data reviewed.  Case discussed with nursing as available.  Plan of Care reviewed with patient and notified of changes.  8/23 the patient with a variety of complaints, suprapubic catheter pain, headaches, fatigue, lethargy, patient is afebrile with stable vital signs at this time, on room air, laboratory data from today is overall benign, glycemic control is adequate.  The patient remains on meropenem for ESBL Klebsiella UTI.  8/24 the patient complains of leg shaking and tremors and therefore had a.m. unassisted fall last night out of bed with a head injury, CT of the head was negative, reportedly the suprapubic catheter was pulled on, follow-up ultrasound appears benign.  Consultants/Specialty  Neuro ophthalmology    Code Status  Full Code    Disposition  SNF referral in process    Review of Systems  Review of Systems   Constitutional: Positive for malaise/fatigue. Negative for chills, fever and weight loss.   HENT: Negative for ear discharge, ear pain and hearing loss.    Eyes: Positive for blurred vision.   Respiratory: Negative for cough and sputum production.    Cardiovascular: Negative for chest pain, palpitations, orthopnea and claudication.   Gastrointestinal: Negative for abdominal pain, diarrhea, heartburn, nausea and vomiting.    Genitourinary: Negative for dysuria, frequency and urgency.        Suprapubic catheter pain   Musculoskeletal: Positive for joint pain (right wrist) and myalgias. Negative for back pain and neck pain.   Neurological: Positive for focal weakness and headaches. Negative for dizziness, tingling, tremors and sensory change.   Psychiatric/Behavioral: Negative for depression, hallucinations, substance abuse and suicidal ideas. The patient is nervous/anxious.         Physical Exam  Temp:  [36.1 °C (97 °F)-36.9 °C (98.4 °F)] 36.9 °C (98.4 °F)  Pulse:  [76-97] 76  Resp:  [18-20] 20  BP: (102-121)/(58-84) 121/84  SpO2:  [97 %-100 %] 97 %    Physical Exam  Constitutional:       Appearance: She is obese. She is ill-appearing.   HENT:      Head: Normocephalic and atraumatic.      Mouth/Throat:      Mouth: Mucous membranes are moist.   Eyes:      General: No scleral icterus.     Extraocular Movements: Extraocular movements intact.      Pupils: Pupils are equal, round, and reactive to light.   Neck:      Musculoskeletal: Normal range of motion and neck supple.   Cardiovascular:      Rate and Rhythm: Normal rate and regular rhythm.      Heart sounds: No murmur. No friction rub. No gallop.    Pulmonary:      Effort: Pulmonary effort is normal. No respiratory distress.      Breath sounds: No stridor. No wheezing or rhonchi.   Chest:      Chest wall: No tenderness.   Abdominal:      General: There is distension.      Palpations: There is no mass.      Tenderness: There is no abdominal tenderness. There is no guarding or rebound.      Hernia: No hernia is present.      Comments: suprapubic cath- no significant redness or swelling   Musculoskeletal: Normal range of motion.         General: No swelling, tenderness, deformity or signs of injury.      Right lower leg: No edema.   Skin:     General: Skin is warm.      Coloration: Skin is not jaundiced or pale.      Findings: No bruising, erythema, lesion or rash.   Neurological:       Mental Status: She is alert and oriented to person, place, and time.      Comments: B/l leg weakness    B/l foot droop   Psychiatric:         Mood and Affect: Mood normal.         Fluids    Intake/Output Summary (Last 24 hours) at 8/24/2020 1355  Last data filed at 8/24/2020 1300  Gross per 24 hour   Intake --   Output 1900 ml   Net -1900 ml       Laboratory  Recent Labs     08/22/20  0139 08/23/20  0011 08/24/20  0035   WBC 6.5 5.4 7.3   RBC 4.03* 4.11* 4.06*   HEMOGLOBIN 11.8* 12.0 11.9*   HEMATOCRIT 38.7 38.2 38.3   MCV 96.0 92.9 94.3   MCH 29.3 29.2 29.3   MCHC 30.5* 31.4* 31.1*   RDW 46.8 45.7 45.0   PLATELETCT 148* 154* 120*   MPV 11.9 11.8 12.0     Recent Labs     08/22/20  0139 08/23/20  0011 08/24/20  0035   SODIUM 140 141 141   POTASSIUM 4.0 3.8 3.7   CHLORIDE 114* 113* 115*   CO2 13* 13* 14*   GLUCOSE 89 83 100*   BUN 16 12 11   CREATININE 0.72 0.71 0.79   CALCIUM 8.7 8.6 8.7                   Imaging  US-BLADDER   Final Result      Suprapubic catheter balloon is inflated, presumably within the collapsed bladder. If more definitive analysis is required, consider clamping the bladder catheter for  hours prior to repeat ultrasound, allowing the bladder to fill with fluid and therefore    better see the bladder walls      CT-HEAD W/O   Final Result         1.  No acute intracranial abnormality.      DX-WRIST-COMPLETE 3+ RIGHT   Final Result      No acute fracture.  If pain persists, recommend repeat imaging in 7 to 10 days.      EC-ECHOCARDIOGRAM COMPLETE W/ CONT   Final Result      CT-CTA NECK WITH & W/O-POST PROCESSING   Final Result      No high-grade stenosis, large vessel occlusion, aneurysm or dissection.      CT-CTA HEAD WITH & W/O-POST PROCESS   Final Result      No intracranial aneurysm, focal stenosis, or abrupt large vessel cut off.      US-CAROTID DOPPLER BILAT   Final Result      CT-HEAD W/O   Final Result      No CT evidence of acute infarct, hemorrhage or mass.           Assessment/Plan  Optic  neuritis- (present on admission)  Assessment & Plan  Continue outpatient prednisone   ophthalmology consulted, Dr. Aparicio.. signed off  continue home Cellcept     cta head and neck--WNL    Echo.  Within normal limits    hypercoag w./u.. Unremarkable except for a mild elevation of homocystine            Hypokalemia- (present on admission)  Assessment & Plan  Monitor and replace    Diabetes mellitus type 2 in obese ([facility])- (present on admission)  Assessment & Plan  Continue outpatient regiment with regular insulin sliding scale.    Insulin dependent  Follow-up A1c is 5    Morbidly obese (HCC)- (present on admission)  Assessment & Plan  BMI 42    Urinary tract infection due to ESBL Klebsiella- (present on admission)  Assessment & Plan  Continue full course of meropenem    Wrist injury, right, initial encounter  Assessment & Plan  Xray negative for fracture    Pain control    Dry eyes- (present on admission)  Assessment & Plan  Eye lubrication     Pain in both feet- (present on admission)  Assessment & Plan  Pain control with po oxycodone        Idiopathic intracranial hypertension- (present on admission)  Assessment & Plan  History of  She is followed by Dr. Yousif, neuro-ophthalmology   continue home Diamox  Supportive care as suggested by neuro-ophthalmology    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HCC)- (present on admission)  Assessment & Plan  Stable at baseline nonambulatory state,    Blurred vision  Assessment & Plan  Presumed retinopathy  monitor    Presence of suprapubic catheter ([facility])- (present on admission)  Assessment & Plan  Infected    Too early to change catheter    Urinary tract infection associated with catheterization of urinary tract (HCC)- (present on admission)  Assessment & Plan  ESBL klebsiella--> IV merrem x 7 days    Schizophrenia ([facility])- (present on admission)  Assessment & Plan  Reported history of   Continue Geodon and Prozac    Peripheral neuropathy and chornic pain syndrome (CMS-[facility])- (present on  admission)  Assessment & Plan  At risk of morbid obesity hypoventilation, avoid excessive medications limiting respiratory drive.  Plan  Ultrasound of the bladder to assure proper placement of suprapubic catheter  Slight adjustment of pain regimen  Continue with current medication regimen and follow closely  Continue IV meropenem as outlined  Question of disposition, skilled nursing versus ECF  Medically complex high risk    VTE prophylaxis: scd    I have performed a physical exam and reviewed and updated ROS and Plan today . In review of yesterday's note , there are no changes except as documented above.        Please note that this dictation was created using voice recognition software. I have made every reasonable attempt to correct obvious errors, but I expect that there are errors of grammar and possibly context that I did not discover before finalizing the note.

## 2020-08-24 NOTE — CARE PLAN
Problem: Safety  Goal: Will remain free from injury  Outcome: PROGRESSING AS EXPECTED  Intervention: Educate patient and significant other/support system about adaptive mobility strategies and safe transfers  Note: Bed alarm on. Call light, phone, and bedside table within reach. Bed rails up x3. Slide board x2 assist when up to wheelchair. Fall precautions maintained.     Problem: Pain Management  Goal: Pain level will decrease to patient's comfort goal  Outcome: PROGRESSING AS EXPECTED  Note: Pt fell last night and suprapubic catheter pulled; cathter intact and draining clear yellow urine. US ordered per MD to ensure catheter in place since patient c/o pain to site. Ibuprofen and Oxycodone 10 mg PRN Q4 mildly control pain.

## 2020-08-24 NOTE — PROGRESS NOTES
Received report and assumed care of patient at 1900. AOx4; VSS Unable to ambulate, uses wheelchair; Suprapubic catheter in place w/positive output, redness at insertion site; PIV patent and SL; Fingersticks q6h; Patient medicated for pain per MAR; Patient had an unwitnessed fall - see note and flowsheet documentation. POC discussed with patient, all questions and concerns addressed; Bed locked and in lowest position; Alarms active and sounding; Call light and personal belongings within reach; Hourly rounding in place.

## 2020-08-25 PROBLEM — E87.6 HYPOKALEMIA: Status: RESOLVED | Noted: 2019-09-29 | Resolved: 2020-08-25

## 2020-08-25 LAB
BACTERIA UR CULT: ABNORMAL
GLUCOSE BLD-MCNC: 116 MG/DL (ref 65–99)
GLUCOSE BLD-MCNC: 150 MG/DL (ref 65–99)
GLUCOSE BLD-MCNC: 76 MG/DL (ref 65–99)
GLUCOSE BLD-MCNC: 93 MG/DL (ref 65–99)
GRAM STN SPEC: ABNORMAL
SIGNIFICANT IND 70042: ABNORMAL
SITE SITE: ABNORMAL
SOURCE SOURCE: ABNORMAL

## 2020-08-25 PROCEDURE — 700111 HCHG RX REV CODE 636 W/ 250 OVERRIDE (IP): Performed by: HOSPITALIST

## 2020-08-25 PROCEDURE — 700102 HCHG RX REV CODE 250 W/ 637 OVERRIDE(OP): Performed by: HOSPITALIST

## 2020-08-25 PROCEDURE — A9270 NON-COVERED ITEM OR SERVICE: HCPCS | Performed by: INTERNAL MEDICINE

## 2020-08-25 PROCEDURE — 700105 HCHG RX REV CODE 258: Performed by: HOSPITALIST

## 2020-08-25 PROCEDURE — 94640 AIRWAY INHALATION TREATMENT: CPT

## 2020-08-25 PROCEDURE — 700111 HCHG RX REV CODE 636 W/ 250 OVERRIDE (IP): Performed by: INTERNAL MEDICINE

## 2020-08-25 PROCEDURE — 700101 HCHG RX REV CODE 250: Performed by: HOSPITALIST

## 2020-08-25 PROCEDURE — A9270 NON-COVERED ITEM OR SERVICE: HCPCS | Performed by: HOSPITALIST

## 2020-08-25 PROCEDURE — 82962 GLUCOSE BLOOD TEST: CPT

## 2020-08-25 PROCEDURE — 770021 HCHG ROOM/CARE - ISO PRIVATE

## 2020-08-25 PROCEDURE — 700102 HCHG RX REV CODE 250 W/ 637 OVERRIDE(OP): Performed by: INTERNAL MEDICINE

## 2020-08-25 PROCEDURE — A9270 NON-COVERED ITEM OR SERVICE: HCPCS | Performed by: NURSE PRACTITIONER

## 2020-08-25 PROCEDURE — 99232 SBSQ HOSP IP/OBS MODERATE 35: CPT | Performed by: INTERNAL MEDICINE

## 2020-08-25 PROCEDURE — 700102 HCHG RX REV CODE 250 W/ 637 OVERRIDE(OP): Performed by: NURSE PRACTITIONER

## 2020-08-25 RX ORDER — PREGABALIN 75 MG/1
300 CAPSULE ORAL 2 TIMES DAILY
Status: DISCONTINUED | OUTPATIENT
Start: 2020-08-25 | End: 2020-09-14 | Stop reason: HOSPADM

## 2020-08-25 RX ORDER — METHYLPREDNISOLONE SODIUM SUCCINATE 40 MG/ML
40 INJECTION, POWDER, LYOPHILIZED, FOR SOLUTION INTRAMUSCULAR; INTRAVENOUS EVERY 6 HOURS
Status: DISCONTINUED | OUTPATIENT
Start: 2020-08-25 | End: 2020-08-26

## 2020-08-25 RX ADMIN — MINERAL OIL, PETROLATUM 1 APPLICATION: 425; 573 OINTMENT OPHTHALMIC at 17:56

## 2020-08-25 RX ADMIN — SODIUM BICARBONATE 650 MG: 650 TABLET ORAL at 19:32

## 2020-08-25 RX ADMIN — MICONAZOLE NITRATE: 20 CREAM TOPICAL at 17:47

## 2020-08-25 RX ADMIN — BUSPIRONE HYDROCHLORIDE 10 MG: 10 TABLET ORAL at 19:32

## 2020-08-25 RX ADMIN — TRAZODONE HYDROCHLORIDE 100 MG: 100 TABLET ORAL at 19:31

## 2020-08-25 RX ADMIN — OXCARBAZEPINE 150 MG: 150 TABLET, FILM COATED ORAL at 05:49

## 2020-08-25 RX ADMIN — MYCOPHENOLATE MOFETIL 1000 MG: 250 CAPSULE ORAL at 19:31

## 2020-08-25 RX ADMIN — DOCUSATE SODIUM 100 MG: 100 CAPSULE, LIQUID FILLED ORAL at 17:47

## 2020-08-25 RX ADMIN — PREDNISONE 10 MG: 10 TABLET ORAL at 05:48

## 2020-08-25 RX ADMIN — MEROPENEM 500 MG: 500 INJECTION, POWDER, FOR SOLUTION INTRAVENOUS at 23:20

## 2020-08-25 RX ADMIN — ZIPRASIDONE HYDROCHLORIDE 40 MG: 40 CAPSULE ORAL at 19:31

## 2020-08-25 RX ADMIN — MICONAZOLE NITRATE: 20 CREAM TOPICAL at 05:46

## 2020-08-25 RX ADMIN — ZIPRASIDONE HYDROCHLORIDE 40 MG: 40 CAPSULE ORAL at 05:48

## 2020-08-25 RX ADMIN — POTASSIUM CHLORIDE 20 MEQ: 20 TABLET, EXTENDED RELEASE ORAL at 17:46

## 2020-08-25 RX ADMIN — IPRATROPIUM BROMIDE AND ALBUTEROL SULFATE 3 ML: .5; 3 SOLUTION RESPIRATORY (INHALATION) at 18:35

## 2020-08-25 RX ADMIN — IVABRADINE 7.5 MG: 7.5 TABLET, FILM COATED ORAL at 19:32

## 2020-08-25 RX ADMIN — SODIUM BICARBONATE 650 MG: 650 TABLET ORAL at 07:52

## 2020-08-25 RX ADMIN — POTASSIUM CHLORIDE 20 MEQ: 20 TABLET, EXTENDED RELEASE ORAL at 05:48

## 2020-08-25 RX ADMIN — MINERAL OIL, PETROLATUM 1 APPLICATION: 425; 573 OINTMENT OPHTHALMIC at 05:46

## 2020-08-25 RX ADMIN — POTASSIUM CHLORIDE 20 MEQ: 20 TABLET, EXTENDED RELEASE ORAL at 11:22

## 2020-08-25 RX ADMIN — IVABRADINE 7.5 MG: 7.5 TABLET, FILM COATED ORAL at 11:20

## 2020-08-25 RX ADMIN — PREGABALIN 300 MG: 150 CAPSULE ORAL at 08:49

## 2020-08-25 RX ADMIN — DOCUSATE SODIUM 50 MG AND SENNOSIDES 8.6 MG 2 TABLET: 8.6; 5 TABLET, FILM COATED ORAL at 17:47

## 2020-08-25 RX ADMIN — FLUOXETINE 40 MG: 20 CAPSULE ORAL at 05:46

## 2020-08-25 RX ADMIN — METHYLPREDNISOLONE SODIUM SUCCINATE 40 MG: 40 INJECTION, POWDER, FOR SOLUTION INTRAMUSCULAR; INTRAVENOUS at 23:20

## 2020-08-25 RX ADMIN — SODIUM BICARBONATE 650 MG: 650 TABLET ORAL at 15:56

## 2020-08-25 RX ADMIN — ASPIRIN 81 MG: 81 TABLET, COATED ORAL at 05:49

## 2020-08-25 RX ADMIN — OXYCODONE HYDROCHLORIDE 10 MG: 5 TABLET ORAL at 15:56

## 2020-08-25 RX ADMIN — MEROPENEM 500 MG: 500 INJECTION, POWDER, FOR SOLUTION INTRAVENOUS at 11:21

## 2020-08-25 RX ADMIN — ACETAZOLAMIDE 1500 MG: 500 CAPSULE, EXTENDED RELEASE ORAL at 05:49

## 2020-08-25 RX ADMIN — MEROPENEM 500 MG: 500 INJECTION, POWDER, FOR SOLUTION INTRAVENOUS at 02:06

## 2020-08-25 RX ADMIN — TOPIRAMATE 50 MG: 25 TABLET, FILM COATED ORAL at 05:48

## 2020-08-25 RX ADMIN — PREGABALIN 300 MG: 150 CAPSULE ORAL at 17:46

## 2020-08-25 RX ADMIN — BUSPIRONE HYDROCHLORIDE 10 MG: 10 TABLET ORAL at 07:52

## 2020-08-25 RX ADMIN — ENOXAPARIN SODIUM 40 MG: 40 INJECTION SUBCUTANEOUS at 05:46

## 2020-08-25 RX ADMIN — MYCOPHENOLATE MOFETIL 1000 MG: 250 CAPSULE ORAL at 05:46

## 2020-08-25 RX ADMIN — GABAPENTIN 300 MG: 300 CAPSULE ORAL at 17:47

## 2020-08-25 RX ADMIN — TOPIRAMATE 50 MG: 25 TABLET, FILM COATED ORAL at 17:47

## 2020-08-25 RX ADMIN — SODIUM BICARBONATE 650 MG: 650 TABLET ORAL at 02:06

## 2020-08-25 RX ADMIN — METHYLPREDNISOLONE SODIUM SUCCINATE 40 MG: 40 INJECTION, POWDER, FOR SOLUTION INTRAMUSCULAR; INTRAVENOUS at 19:05

## 2020-08-25 RX ADMIN — GABAPENTIN 300 MG: 300 CAPSULE ORAL at 11:21

## 2020-08-25 RX ADMIN — LEVOTHYROXINE SODIUM 75 MCG: 0.07 TABLET ORAL at 05:49

## 2020-08-25 RX ADMIN — OXCARBAZEPINE 150 MG: 150 TABLET, FILM COATED ORAL at 17:47

## 2020-08-25 RX ADMIN — INSULIN GLARGINE 8 UNITS: 100 INJECTION, SOLUTION SUBCUTANEOUS at 17:50

## 2020-08-25 RX ADMIN — MEROPENEM 500 MG: 500 INJECTION, POWDER, FOR SOLUTION INTRAVENOUS at 17:45

## 2020-08-25 RX ADMIN — ACETAZOLAMIDE 1000 MG: 500 CAPSULE, EXTENDED RELEASE ORAL at 17:46

## 2020-08-25 RX ADMIN — ALBUTEROL SULFATE 2 PUFF: 90 AEROSOL, METERED RESPIRATORY (INHALATION) at 18:27

## 2020-08-25 RX ADMIN — GABAPENTIN 300 MG: 300 CAPSULE ORAL at 05:49

## 2020-08-25 RX ADMIN — MEROPENEM 500 MG: 500 INJECTION, POWDER, FOR SOLUTION INTRAVENOUS at 05:43

## 2020-08-25 RX ADMIN — DOCUSATE SODIUM 100 MG: 100 CAPSULE, LIQUID FILLED ORAL at 05:49

## 2020-08-25 RX ADMIN — DOCUSATE SODIUM 50 MG AND SENNOSIDES 8.6 MG 2 TABLET: 8.6; 5 TABLET, FILM COATED ORAL at 05:49

## 2020-08-25 RX ADMIN — MINERAL OIL, PETROLATUM 1 APPLICATION: 425; 573 OINTMENT OPHTHALMIC at 23:20

## 2020-08-25 ASSESSMENT — ENCOUNTER SYMPTOMS
CONSTIPATION: 0
SHORTNESS OF BREATH: 0
FEVER: 0
ABDOMINAL PAIN: 1
VOMITING: 0
HEADACHES: 0
FOCAL WEAKNESS: 1
DIZZINESS: 0

## 2020-08-25 NOTE — PROGRESS NOTES
Report received. Assumed care. Pt in bed awake. A/O x4. VSS. Responds appropriately. Denies pain, SOB. Assessment complete. Suprapubic catheter in place with moderates red tinged color urine output, intact.  Discussed POC, monitor VS/labs, IV antbx, isolation, mobility, safety, DC planning , pt verbalizes understanding. Explained importance of calling before getting OOB. Call light and belongings within reach. Bed alarm on. Bed in the lowest position. Treaded socks in place. Hourly rounding in progress. Will continue to monitor .

## 2020-08-25 NOTE — DISCHARGE PLANNING
Agency/Facility Name: [facility]  Outcome: No open beds in SNF, pending swing bed availability.

## 2020-08-25 NOTE — CARE PLAN
Problem: Safety  Goal: Will remain free from injury  Outcome: PROGRESSING AS EXPECTED  Note: Treaded socks in place, bed in the lowest position, bed alarm on, call light and belongings within reach, pt call for assistance appropriately      Problem: Knowledge Deficit  Goal: Knowledge of disease process/condition, treatment plan, diagnostic tests, and medications will improve  Outcome: PROGRESSING AS EXPECTED  Note: Educated on POC, all questions answered, hourly rounding in progress     Problem: Pain Management  Goal: Pain level will decrease to patient's comfort goal  Outcome: PROGRESSING AS EXPECTED  Note: Denies pain at this time, hourly rounding in progress     Problem: Skin Integrity  Goal: Risk for impaired skin integrity will decrease  Outcome: PROGRESSING AS EXPECTED  Note: josé risk assessment, pt turns self from side to side,  applied barrier cream to sacrum and perineal area

## 2020-08-25 NOTE — PROGRESS NOTES
Dr. Rivas notified that patient's CBS 75 and Lantus 12 units scheduled now; patient is eating dinner. Pt's CBS has been 70-90s today. ACHS accuchecks ordered to for another day and then QD if CBS WNL. Lantus to be decreased to 8 units QD and give dose now.

## 2020-08-25 NOTE — PROGRESS NOTES
Received report and assumed care of patient at 1900. AOx4; Unable to ambulate, wheelchair bound, MAX assist; Suprapubic catheter w/moderate output, redness and pain at insertion site; PIV patent; Patient medicated for pain per MAR; POC discussed with patient, all questions and concerns addressed; Bed locked and in lowest position; Strip and frame active and sounding; Call light and personal belongings within reach; Hourly rounding in place.

## 2020-08-25 NOTE — PROGRESS NOTES
Hospital Medicine Daily Progress Note    Date of Service  8/25/2020    Chief Complaint  29 y.o. female admitted 8/15/2020 with blurred vision.    Hospital Course    PMH transverse myelitis, chronic relapsing inflammatory optic neuropathy, suprapubic cath, DM who presented with HA and blurred vision. CTA head and neck and TTE were unremarkable. Dr. Yousif was consulted, recommended conservative medication management for her transient monocular visual loss. She also was found with UTI ESBL klebsiella. She is unable to return home, grandmother unable to care for her. Case management has been closely involved.       Interval Problem Update  She has some pain around her suprapubic cath site.  Eating fine  Catheter bag with hematuria, no clots seen    Consultants/Specialty  Ophtho Dr. Soliz  Neuro-ophtho Dr. Yousif    Code Status  Full Code    Disposition  grandmother unable to care for her  SNF pending    Review of Systems  Review of Systems   Constitutional: Negative for fever.   Respiratory: Negative for shortness of breath.    Cardiovascular: Negative for chest pain.   Gastrointestinal: Positive for abdominal pain. Negative for constipation and vomiting.   Genitourinary: Positive for hematuria. Negative for dysuria.   Neurological: Positive for focal weakness (chronic). Negative for dizziness and headaches.        Physical Exam  Temp:  [36.3 °C (97.4 °F)-36.8 °C (98.3 °F)] 36.6 °C (97.8 °F)  Pulse:  [60-73] 73  Resp:  [16-19] 16  BP: (103-128)/(58-79) 128/79  SpO2:  [95 %-98 %] 98 %    Physical Exam  Constitutional:       General: She is not in acute distress.     Appearance: She is obese. She is not toxic-appearing or diaphoretic.   HENT:      Mouth/Throat:      Mouth: Mucous membranes are moist.   Eyes:      General:         Right eye: No discharge.         Left eye: No discharge.   Neck:      Musculoskeletal: Neck supple.   Cardiovascular:      Rate and Rhythm: Normal rate and regular rhythm.   Pulmonary:       Effort: No respiratory distress.      Breath sounds: No wheezing or rales.   Abdominal:      Tenderness: There is abdominal tenderness (suprapubic near cath site). There is no guarding or rebound.      Comments: suprapubic cath in place, no drainage seen, mildly erythematous around tube   Musculoskeletal:         General: No swelling.   Skin:     General: Skin is warm and dry.   Neurological:      Mental Status: She is alert and oriented to person, place, and time.         Fluids    Intake/Output Summary (Last 24 hours) at 8/25/2020 1326  Last data filed at 8/25/2020 0600  Gross per 24 hour   Intake 200 ml   Output 1000 ml   Net -800 ml       Laboratory  Recent Labs     08/23/20  0011 08/24/20  0035   WBC 5.4 7.3   RBC 4.11* 4.06*   HEMOGLOBIN 12.0 11.9*   HEMATOCRIT 38.2 38.3   MCV 92.9 94.3   MCH 29.2 29.3   MCHC 31.4* 31.1*   RDW 45.7 45.0   PLATELETCT 154* 120*   MPV 11.8 12.0     Recent Labs     08/23/20  0011 08/24/20  0035   SODIUM 141 141   POTASSIUM 3.8 3.7   CHLORIDE 113* 115*   CO2 13* 14*   GLUCOSE 83 100*   BUN 12 11   CREATININE 0.71 0.79   CALCIUM 8.6 8.7                   Imaging  US-BLADDER   Final Result      Suprapubic catheter balloon is inflated, presumably within the collapsed bladder. If more definitive analysis is required, consider clamping the bladder catheter for  hours prior to repeat ultrasound, allowing the bladder to fill with fluid and therefore    better see the bladder walls      CT-HEAD W/O   Final Result         1.  No acute intracranial abnormality.      DX-WRIST-COMPLETE 3+ RIGHT   Final Result      No acute fracture.  If pain persists, recommend repeat imaging in 7 to 10 days.      EC-ECHOCARDIOGRAM COMPLETE W/ CONT   Final Result      CT-CTA NECK WITH & W/O-POST PROCESSING   Final Result      No high-grade stenosis, large vessel occlusion, aneurysm or dissection.      CT-CTA HEAD WITH & W/O-POST PROCESS   Final Result      No intracranial aneurysm, focal stenosis, or abrupt  large vessel cut off.      US-CAROTID DOPPLER BILAT   Final Result      CT-HEAD W/O   Final Result      No CT evidence of acute infarct, hemorrhage or mass.           Assessment/Plan  Optic neuritis- (present on admission)  Assessment & Plan  Continue outpatient prednisone   ophthalmology consulted, Dr. Aparicio.. signed off  continue home Cellcept     cta head and neck--WNL    Echo.  Within normal limits    hypercoag w./u.. Unremarkable except for a mild elevation of homocystine            Diabetes mellitus type 2 in obese (HCC)- (present on admission)  Assessment & Plan  Lantus decreased to 8U qhs  Follow-up A1c is 5  Glucose well controlled  ISS    Morbidly obese (HCC)- (present on admission)  Assessment & Plan  BMI 42    Urinary tract infection due to ESBL Klebsiella- (present on admission)  Assessment & Plan  Continue full course of meropenem    Wrist injury, right, initial encounter  Assessment & Plan  Xray negative for fracture    Pain control    Dry eyes- (present on admission)  Assessment & Plan  Eye lubrication     Pain in both feet- (present on admission)  Assessment & Plan  Pain control with po oxycodone        Idiopathic intracranial hypertension- (present on admission)  Assessment & Plan  History of  She is followed by Dr. Yousif, neuro-ophthalmology   continue home Diamox  Supportive care as suggested by neuro-ophthalmology    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HCC)- (present on admission)  Assessment & Plan  Stable at baseline nonambulatory state,    Blurred vision  Assessment & Plan  Presumed retinopathy  monitor    Presence of suprapubic catheter (HCC)- (present on admission)  Assessment & Plan  Infected  Too early to change catheter  8/25 - some hematuria noted, not affecting outflow, will monitor for now but may need to stop lovenox if it continues to worsens    Urinary tract infection associated with catheterization of urinary tract (HCC)- (present on admission)  Assessment & Plan  ESBL klebsiella--> IV  merrem x 7 days    Schizophrenia (HCC)- (present on admission)  Assessment & Plan  Reported history of   Continue Geodon and Prozac    Peripheral neuropathy and chornic pain syndrome (CMS-HCC)- (present on admission)  Assessment & Plan  At risk of morbid obesity hypoventilation, avoid excessive medications limiting respiratory drive.       VTE prophylaxis: lovenox

## 2020-08-25 NOTE — CARE PLAN
Problem: Venous Thromboembolism (VTW)/Deep Vein Thrombosis (DVT) Prevention:  Goal: Patient will participate in Venous Thrombosis (VTE)/Deep Vein Thrombosis (DVT)Prevention Measures  Outcome: PROGRESSING AS EXPECTED     Problem: Urinary Elimination:  Goal: Ability to reestablish a normal urinary elimination pattern will improve  Outcome: PROGRESSING SLOWER THAN EXPECTED

## 2020-08-25 NOTE — DISCHARGE PLANNING
Anticipated Discharge Disposition: TBD    Action: CM spoke with pt's grandmother; update provided on discharge plan.    Barriers to Discharge: SNF acceptance pending.    Plan: CCA contact facilities for updates on referrals. Continue to work on placement.

## 2020-08-26 ENCOUNTER — APPOINTMENT (OUTPATIENT)
Dept: RADIOLOGY | Facility: MEDICAL CENTER | Age: 30
DRG: 123 | End: 2020-08-26
Attending: INTERNAL MEDICINE
Payer: MEDICARE

## 2020-08-26 LAB
ANION GAP SERPL CALC-SCNC: 14 MMOL/L (ref 7–16)
BASOPHILS # BLD AUTO: 0.3 % (ref 0–1.8)
BASOPHILS # BLD: 0.02 K/UL (ref 0–0.12)
BUN SERPL-MCNC: 11 MG/DL (ref 8–22)
CALCIUM SERPL-MCNC: 8.8 MG/DL (ref 8.5–10.5)
CHLORIDE SERPL-SCNC: 114 MMOL/L (ref 96–112)
CO2 SERPL-SCNC: 14 MMOL/L (ref 20–33)
CREAT SERPL-MCNC: 0.67 MG/DL (ref 0.5–1.4)
EOSINOPHIL # BLD AUTO: 0.01 K/UL (ref 0–0.51)
EOSINOPHIL NFR BLD: 0.1 % (ref 0–6.9)
ERYTHROCYTE [DISTWIDTH] IN BLOOD BY AUTOMATED COUNT: 44 FL (ref 35.9–50)
GLUCOSE BLD-MCNC: 114 MG/DL (ref 65–99)
GLUCOSE BLD-MCNC: 124 MG/DL (ref 65–99)
GLUCOSE SERPL-MCNC: 126 MG/DL (ref 65–99)
HCT VFR BLD AUTO: 39.7 % (ref 37–47)
HGB BLD-MCNC: 12.6 G/DL (ref 12–16)
IMM GRANULOCYTES # BLD AUTO: 0.05 K/UL (ref 0–0.11)
IMM GRANULOCYTES NFR BLD AUTO: 0.7 % (ref 0–0.9)
LYMPHOCYTES # BLD AUTO: 0.44 K/UL (ref 1–4.8)
LYMPHOCYTES NFR BLD: 5.9 % (ref 22–41)
MCH RBC QN AUTO: 29.2 PG (ref 27–33)
MCHC RBC AUTO-ENTMCNC: 31.7 G/DL (ref 33.6–35)
MCV RBC AUTO: 92.1 FL (ref 81.4–97.8)
MONOCYTES # BLD AUTO: 0.15 K/UL (ref 0–0.85)
MONOCYTES NFR BLD AUTO: 2 % (ref 0–13.4)
NEUTROPHILS # BLD AUTO: 6.77 K/UL (ref 2–7.15)
NEUTROPHILS NFR BLD: 91 % (ref 44–72)
NRBC # BLD AUTO: 0 K/UL
NRBC BLD-RTO: 0 /100 WBC
PLATELET # BLD AUTO: 144 K/UL (ref 164–446)
PMV BLD AUTO: 12.4 FL (ref 9–12.9)
POTASSIUM SERPL-SCNC: 4 MMOL/L (ref 3.6–5.5)
RBC # BLD AUTO: 4.31 M/UL (ref 4.2–5.4)
SODIUM SERPL-SCNC: 142 MMOL/L (ref 135–145)
TROPONIN T SERPL-MCNC: <6 NG/L (ref 6–19)
WBC # BLD AUTO: 7.4 K/UL (ref 4.8–10.8)

## 2020-08-26 PROCEDURE — 700111 HCHG RX REV CODE 636 W/ 250 OVERRIDE (IP): Performed by: HOSPITALIST

## 2020-08-26 PROCEDURE — 770021 HCHG ROOM/CARE - ISO PRIVATE

## 2020-08-26 PROCEDURE — 0298T PR EXT ECG > 48HR TO 21 DAY REVIEW AND INTERPRETATN: CPT | Performed by: INTERNAL MEDICINE

## 2020-08-26 PROCEDURE — 80048 BASIC METABOLIC PNL TOTAL CA: CPT

## 2020-08-26 PROCEDURE — 700102 HCHG RX REV CODE 250 W/ 637 OVERRIDE(OP): Performed by: INTERNAL MEDICINE

## 2020-08-26 PROCEDURE — 84484 ASSAY OF TROPONIN QUANT: CPT

## 2020-08-26 PROCEDURE — 36415 COLL VENOUS BLD VENIPUNCTURE: CPT

## 2020-08-26 PROCEDURE — A9270 NON-COVERED ITEM OR SERVICE: HCPCS | Performed by: NURSE PRACTITIONER

## 2020-08-26 PROCEDURE — A9270 NON-COVERED ITEM OR SERVICE: HCPCS | Performed by: HOSPITALIST

## 2020-08-26 PROCEDURE — 99232 SBSQ HOSP IP/OBS MODERATE 35: CPT | Performed by: INTERNAL MEDICINE

## 2020-08-26 PROCEDURE — 700102 HCHG RX REV CODE 250 W/ 637 OVERRIDE(OP): Performed by: NURSE PRACTITIONER

## 2020-08-26 PROCEDURE — 700102 HCHG RX REV CODE 250 W/ 637 OVERRIDE(OP): Performed by: HOSPITALIST

## 2020-08-26 PROCEDURE — A9270 NON-COVERED ITEM OR SERVICE: HCPCS | Performed by: INTERNAL MEDICINE

## 2020-08-26 PROCEDURE — 85025 COMPLETE CBC W/AUTO DIFF WBC: CPT

## 2020-08-26 PROCEDURE — 700105 HCHG RX REV CODE 258: Performed by: HOSPITALIST

## 2020-08-26 PROCEDURE — 82962 GLUCOSE BLOOD TEST: CPT

## 2020-08-26 PROCEDURE — 700111 HCHG RX REV CODE 636 W/ 250 OVERRIDE (IP): Performed by: INTERNAL MEDICINE

## 2020-08-26 PROCEDURE — 71045 X-RAY EXAM CHEST 1 VIEW: CPT

## 2020-08-26 PROCEDURE — 93005 ELECTROCARDIOGRAM TRACING: CPT | Performed by: INTERNAL MEDICINE

## 2020-08-26 PROCEDURE — 0296T PR EXT ECG > 48HR TO 21 DAY RCRD W/CONECT INTL RCRD: CPT | Performed by: INTERNAL MEDICINE

## 2020-08-26 PROCEDURE — 700105 HCHG RX REV CODE 258: Performed by: INTERNAL MEDICINE

## 2020-08-26 RX ORDER — PREDNISONE 10 MG/1
10 TABLET ORAL DAILY
Status: DISCONTINUED | OUTPATIENT
Start: 2020-08-27 | End: 2020-09-14 | Stop reason: HOSPADM

## 2020-08-26 RX ORDER — METHOCARBAMOL 750 MG/1
750 TABLET, FILM COATED ORAL 4 TIMES DAILY PRN
Status: DISCONTINUED | OUTPATIENT
Start: 2020-08-26 | End: 2020-08-29

## 2020-08-26 RX ADMIN — MEROPENEM 500 MG: 500 INJECTION, POWDER, FOR SOLUTION INTRAVENOUS at 05:10

## 2020-08-26 RX ADMIN — ZIPRASIDONE HYDROCHLORIDE 40 MG: 40 CAPSULE ORAL at 05:09

## 2020-08-26 RX ADMIN — ACETAZOLAMIDE 1500 MG: 500 CAPSULE, EXTENDED RELEASE ORAL at 05:08

## 2020-08-26 RX ADMIN — OXYCODONE HYDROCHLORIDE 5 MG: 5 TABLET ORAL at 14:45

## 2020-08-26 RX ADMIN — TRAZODONE HYDROCHLORIDE 100 MG: 100 TABLET ORAL at 21:16

## 2020-08-26 RX ADMIN — POTASSIUM CHLORIDE 20 MEQ: 20 TABLET, EXTENDED RELEASE ORAL at 05:09

## 2020-08-26 RX ADMIN — TOPIRAMATE 50 MG: 25 TABLET, FILM COATED ORAL at 05:08

## 2020-08-26 RX ADMIN — MICONAZOLE NITRATE: 20 CREAM TOPICAL at 06:00

## 2020-08-26 RX ADMIN — GABAPENTIN 300 MG: 300 CAPSULE ORAL at 17:23

## 2020-08-26 RX ADMIN — ZIPRASIDONE HYDROCHLORIDE 40 MG: 40 CAPSULE ORAL at 17:21

## 2020-08-26 RX ADMIN — GABAPENTIN 300 MG: 300 CAPSULE ORAL at 11:35

## 2020-08-26 RX ADMIN — MEROPENEM 500 MG: 500 INJECTION, POWDER, FOR SOLUTION INTRAVENOUS at 11:35

## 2020-08-26 RX ADMIN — INSULIN GLARGINE 8 UNITS: 100 INJECTION, SOLUTION SUBCUTANEOUS at 17:27

## 2020-08-26 RX ADMIN — BUSPIRONE HYDROCHLORIDE 10 MG: 10 TABLET ORAL at 21:16

## 2020-08-26 RX ADMIN — FLUOXETINE 40 MG: 20 CAPSULE ORAL at 05:09

## 2020-08-26 RX ADMIN — DOCUSATE SODIUM 100 MG: 100 CAPSULE, LIQUID FILLED ORAL at 05:09

## 2020-08-26 RX ADMIN — IVABRADINE 7.5 MG: 7.5 TABLET, FILM COATED ORAL at 17:22

## 2020-08-26 RX ADMIN — DOCUSATE SODIUM 50 MG AND SENNOSIDES 8.6 MG 2 TABLET: 8.6; 5 TABLET, FILM COATED ORAL at 05:09

## 2020-08-26 RX ADMIN — MEROPENEM 500 MG: 500 INJECTION, POWDER, FOR SOLUTION INTRAVENOUS at 21:16

## 2020-08-26 RX ADMIN — TOPIRAMATE 50 MG: 25 TABLET, FILM COATED ORAL at 17:22

## 2020-08-26 RX ADMIN — METHOCARBAMOL TABLETS 750 MG: 750 TABLET, COATED ORAL at 11:59

## 2020-08-26 RX ADMIN — PREGABALIN 300 MG: 150 CAPSULE ORAL at 05:13

## 2020-08-26 RX ADMIN — LEVOTHYROXINE SODIUM 75 MCG: 0.07 TABLET ORAL at 05:09

## 2020-08-26 RX ADMIN — POTASSIUM CHLORIDE 20 MEQ: 20 TABLET, EXTENDED RELEASE ORAL at 11:35

## 2020-08-26 RX ADMIN — MINERAL OIL, PETROLATUM 1 APPLICATION: 425; 573 OINTMENT OPHTHALMIC at 17:22

## 2020-08-26 RX ADMIN — OXYCODONE HYDROCHLORIDE 10 MG: 5 TABLET ORAL at 01:03

## 2020-08-26 RX ADMIN — PREGABALIN 300 MG: 150 CAPSULE ORAL at 17:22

## 2020-08-26 RX ADMIN — SODIUM BICARBONATE 650 MG: 650 TABLET ORAL at 02:49

## 2020-08-26 RX ADMIN — OXYCODONE HYDROCHLORIDE 5 MG: 5 TABLET ORAL at 08:24

## 2020-08-26 RX ADMIN — MICONAZOLE NITRATE: 20 CREAM TOPICAL at 18:00

## 2020-08-26 RX ADMIN — METHYLPREDNISOLONE SODIUM SUCCINATE 40 MG: 40 INJECTION, POWDER, FOR SOLUTION INTRAMUSCULAR; INTRAVENOUS at 05:10

## 2020-08-26 RX ADMIN — GABAPENTIN 300 MG: 300 CAPSULE ORAL at 05:09

## 2020-08-26 RX ADMIN — MYCOPHENOLATE MOFETIL 1000 MG: 250 CAPSULE ORAL at 05:09

## 2020-08-26 RX ADMIN — MINERAL OIL, PETROLATUM 1 APPLICATION: 425; 573 OINTMENT OPHTHALMIC at 12:00

## 2020-08-26 RX ADMIN — OXCARBAZEPINE 150 MG: 150 TABLET, FILM COATED ORAL at 17:21

## 2020-08-26 RX ADMIN — METHYLPREDNISOLONE SODIUM SUCCINATE 40 MG: 40 INJECTION, POWDER, FOR SOLUTION INTRAMUSCULAR; INTRAVENOUS at 11:35

## 2020-08-26 RX ADMIN — ENOXAPARIN SODIUM 40 MG: 40 INJECTION SUBCUTANEOUS at 05:10

## 2020-08-26 RX ADMIN — SODIUM BICARBONATE 650 MG: 650 TABLET ORAL at 14:45

## 2020-08-26 RX ADMIN — OXYCODONE HYDROCHLORIDE 5 MG: 5 TABLET ORAL at 20:11

## 2020-08-26 RX ADMIN — MYCOPHENOLATE MOFETIL 1000 MG: 250 CAPSULE ORAL at 17:22

## 2020-08-26 RX ADMIN — SODIUM BICARBONATE 650 MG: 650 TABLET ORAL at 08:19

## 2020-08-26 RX ADMIN — ASPIRIN 81 MG: 81 TABLET, COATED ORAL at 05:09

## 2020-08-26 RX ADMIN — BUSPIRONE HYDROCHLORIDE 10 MG: 10 TABLET ORAL at 08:19

## 2020-08-26 RX ADMIN — POTASSIUM CHLORIDE 20 MEQ: 20 TABLET, EXTENDED RELEASE ORAL at 17:23

## 2020-08-26 RX ADMIN — MINERAL OIL, PETROLATUM 1 APPLICATION: 425; 573 OINTMENT OPHTHALMIC at 05:10

## 2020-08-26 RX ADMIN — SODIUM BICARBONATE 650 MG: 650 TABLET ORAL at 21:16

## 2020-08-26 RX ADMIN — IVABRADINE 7.5 MG: 7.5 TABLET, FILM COATED ORAL at 08:19

## 2020-08-26 RX ADMIN — OXCARBAZEPINE 150 MG: 150 TABLET, FILM COATED ORAL at 05:09

## 2020-08-26 RX ADMIN — ACETAZOLAMIDE 1000 MG: 500 CAPSULE, EXTENDED RELEASE ORAL at 17:21

## 2020-08-26 ASSESSMENT — ENCOUNTER SYMPTOMS
FEVER: 0
HEMOPTYSIS: 0
SPUTUM PRODUCTION: 0
WHEEZING: 0
SHORTNESS OF BREATH: 0
FOCAL WEAKNESS: 1
HEADACHES: 0
COUGH: 0
VOMITING: 0
DIZZINESS: 0
CONSTIPATION: 0

## 2020-08-26 NOTE — PROGRESS NOTES
Assumed pt care at 0715.  Received report from night RN.  Assessment completed.  Pt AAOx4.  Pt complains of back and suprapubic cath pain.  Medicated per MAR.  Pt is WC bound with history of fall.  Bed alarm in place, call light within reach and staff numbers provided.  Pt needs met at this time.

## 2020-08-26 NOTE — PROGRESS NOTES
"At approximately 1825 this RN was called to the pt's room because the pt was \"having trouble breathing\" Audible wheezing heard upon entering pt room. Upon assessment pt is calm, wheezing heard throughout lung fields Oxygen saturation >95 on RA. This RN called RT for PRN treatment. This RN administered rescue inhaler with some improvement in wheezing.   Discussed situation with . verbal order received for IV SoluMedrol.  RT administering PRN Duoneb now  Pt calm, unlabored breathing noted.  in place. Reports improvement in WOB. Oxygen saturation stable >95%.   "
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
## 2020-08-26 NOTE — DISCHARGE PLANNING
Agency/Facility Name: [facility]  Plan or Request: Left vm and requested a call back from Maria Esther.  [phone]    8745- Agency/Facility Name: [facility]  Spoke To: Maria Esther  Outcome: Maria Esther returned call and stated pt is declined / at baseline- non compliant. No long term female bed available.    8310- [facility] spoke with Tatyana (liaison) and she stated she is still reviewing referral for the [facility].    0924- Trenton Joseph at [facility] / no female beds open
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
## 2020-08-27 ENCOUNTER — APPOINTMENT (OUTPATIENT)
Dept: RADIOLOGY | Facility: MEDICAL CENTER | Age: 30
DRG: 123 | End: 2020-08-27
Attending: INTERNAL MEDICINE
Payer: MEDICARE

## 2020-08-27 PROBLEM — R94.31 QT PROLONGATION: Status: ACTIVE | Noted: 2020-08-27

## 2020-08-27 LAB
ANION GAP SERPL CALC-SCNC: 10 MMOL/L (ref 7–16)
BUN SERPL-MCNC: 15 MG/DL (ref 8–22)
CALCIUM SERPL-MCNC: 8.8 MG/DL (ref 8.5–10.5)
CHLORIDE SERPL-SCNC: 111 MMOL/L (ref 96–112)
CO2 SERPL-SCNC: 15 MMOL/L (ref 20–33)
CREAT SERPL-MCNC: 0.63 MG/DL (ref 0.5–1.4)
EKG IMPRESSION: NORMAL
GLUCOSE SERPL-MCNC: 102 MG/DL (ref 65–99)
MAGNESIUM SERPL-MCNC: 2 MG/DL (ref 1.5–2.5)
PHOSPHATE SERPL-MCNC: 2.2 MG/DL (ref 2.5–4.5)
POTASSIUM SERPL-SCNC: 3.7 MMOL/L (ref 3.6–5.5)
SODIUM SERPL-SCNC: 136 MMOL/L (ref 135–145)

## 2020-08-27 PROCEDURE — A9270 NON-COVERED ITEM OR SERVICE: HCPCS | Performed by: NURSE PRACTITIONER

## 2020-08-27 PROCEDURE — A9270 NON-COVERED ITEM OR SERVICE: HCPCS | Performed by: INTERNAL MEDICINE

## 2020-08-27 PROCEDURE — A9270 NON-COVERED ITEM OR SERVICE: HCPCS | Performed by: HOSPITALIST

## 2020-08-27 PROCEDURE — 84100 ASSAY OF PHOSPHORUS: CPT

## 2020-08-27 PROCEDURE — 700102 HCHG RX REV CODE 250 W/ 637 OVERRIDE(OP): Performed by: NURSE PRACTITIONER

## 2020-08-27 PROCEDURE — 700102 HCHG RX REV CODE 250 W/ 637 OVERRIDE(OP): Performed by: INTERNAL MEDICINE

## 2020-08-27 PROCEDURE — 700111 HCHG RX REV CODE 636 W/ 250 OVERRIDE (IP): Performed by: HOSPITALIST

## 2020-08-27 PROCEDURE — 700111 HCHG RX REV CODE 636 W/ 250 OVERRIDE (IP): Performed by: INTERNAL MEDICINE

## 2020-08-27 PROCEDURE — 80048 BASIC METABOLIC PNL TOTAL CA: CPT

## 2020-08-27 PROCEDURE — 770021 HCHG ROOM/CARE - ISO PRIVATE

## 2020-08-27 PROCEDURE — 700102 HCHG RX REV CODE 250 W/ 637 OVERRIDE(OP): Performed by: HOSPITALIST

## 2020-08-27 PROCEDURE — 73030 X-RAY EXAM OF SHOULDER: CPT | Mod: LT

## 2020-08-27 PROCEDURE — 83735 ASSAY OF MAGNESIUM: CPT

## 2020-08-27 PROCEDURE — 82962 GLUCOSE BLOOD TEST: CPT

## 2020-08-27 PROCEDURE — 93010 ELECTROCARDIOGRAM REPORT: CPT | Performed by: INTERNAL MEDICINE

## 2020-08-27 PROCEDURE — 700105 HCHG RX REV CODE 258: Performed by: INTERNAL MEDICINE

## 2020-08-27 PROCEDURE — 99232 SBSQ HOSP IP/OBS MODERATE 35: CPT | Performed by: INTERNAL MEDICINE

## 2020-08-27 PROCEDURE — 36415 COLL VENOUS BLD VENIPUNCTURE: CPT

## 2020-08-27 RX ORDER — OXYBUTYNIN CHLORIDE 5 MG/1
5 TABLET ORAL 3 TIMES DAILY
Status: DISCONTINUED | OUTPATIENT
Start: 2020-08-27 | End: 2020-09-14 | Stop reason: HOSPADM

## 2020-08-27 RX ADMIN — FLUOXETINE 40 MG: 20 CAPSULE ORAL at 04:56

## 2020-08-27 RX ADMIN — ZIPRASIDONE HYDROCHLORIDE 40 MG: 40 CAPSULE ORAL at 17:31

## 2020-08-27 RX ADMIN — BUSPIRONE HYDROCHLORIDE 10 MG: 10 TABLET ORAL at 08:02

## 2020-08-27 RX ADMIN — ACETAZOLAMIDE 1000 MG: 500 CAPSULE, EXTENDED RELEASE ORAL at 22:06

## 2020-08-27 RX ADMIN — MINERAL OIL, PETROLATUM 1 APPLICATION: 425; 573 OINTMENT OPHTHALMIC at 17:30

## 2020-08-27 RX ADMIN — OXYBUTYNIN CHLORIDE 5 MG: 5 TABLET ORAL at 13:48

## 2020-08-27 RX ADMIN — OXCARBAZEPINE 150 MG: 150 TABLET, FILM COATED ORAL at 05:11

## 2020-08-27 RX ADMIN — MEROPENEM 500 MG: 500 INJECTION, POWDER, FOR SOLUTION INTRAVENOUS at 13:47

## 2020-08-27 RX ADMIN — ACETAZOLAMIDE 1500 MG: 500 CAPSULE, EXTENDED RELEASE ORAL at 04:55

## 2020-08-27 RX ADMIN — LEVOTHYROXINE SODIUM 75 MCG: 0.07 TABLET ORAL at 04:56

## 2020-08-27 RX ADMIN — MINERAL OIL, PETROLATUM 1 APPLICATION: 425; 573 OINTMENT OPHTHALMIC at 04:55

## 2020-08-27 RX ADMIN — MYCOPHENOLATE MOFETIL 1000 MG: 250 CAPSULE ORAL at 17:31

## 2020-08-27 RX ADMIN — ENOXAPARIN SODIUM 40 MG: 40 INJECTION SUBCUTANEOUS at 04:55

## 2020-08-27 RX ADMIN — TOPIRAMATE 50 MG: 25 TABLET, FILM COATED ORAL at 17:30

## 2020-08-27 RX ADMIN — OXYBUTYNIN CHLORIDE 5 MG: 5 TABLET ORAL at 17:32

## 2020-08-27 RX ADMIN — SODIUM BICARBONATE 650 MG: 650 TABLET ORAL at 21:58

## 2020-08-27 RX ADMIN — MEROPENEM 500 MG: 500 INJECTION, POWDER, FOR SOLUTION INTRAVENOUS at 22:01

## 2020-08-27 RX ADMIN — DIBASIC SODIUM PHOSPHATE, MONOBASIC POTASSIUM PHOSPHATE AND MONOBASIC SODIUM PHOSPHATE 250 MG: 852; 155; 130 TABLET ORAL at 13:48

## 2020-08-27 RX ADMIN — GABAPENTIN 300 MG: 300 CAPSULE ORAL at 04:56

## 2020-08-27 RX ADMIN — INSULIN GLARGINE 8 UNITS: 100 INJECTION, SOLUTION SUBCUTANEOUS at 18:06

## 2020-08-27 RX ADMIN — DOCUSATE SODIUM 50 MG AND SENNOSIDES 8.6 MG 2 TABLET: 8.6; 5 TABLET, FILM COATED ORAL at 17:29

## 2020-08-27 RX ADMIN — ASPIRIN 81 MG: 81 TABLET, COATED ORAL at 04:56

## 2020-08-27 RX ADMIN — PREGABALIN 300 MG: 150 CAPSULE ORAL at 21:58

## 2020-08-27 RX ADMIN — IVABRADINE 7.5 MG: 7.5 TABLET, FILM COATED ORAL at 08:02

## 2020-08-27 RX ADMIN — METHOCARBAMOL TABLETS 750 MG: 750 TABLET, COATED ORAL at 13:01

## 2020-08-27 RX ADMIN — SODIUM BICARBONATE 650 MG: 650 TABLET ORAL at 04:54

## 2020-08-27 RX ADMIN — OXYCODONE HYDROCHLORIDE 10 MG: 5 TABLET ORAL at 10:21

## 2020-08-27 RX ADMIN — ALBUTEROL SULFATE 2 PUFF: 90 AEROSOL, METERED RESPIRATORY (INHALATION) at 22:50

## 2020-08-27 RX ADMIN — OXCARBAZEPINE 150 MG: 150 TABLET, FILM COATED ORAL at 17:30

## 2020-08-27 RX ADMIN — DOCUSATE SODIUM 100 MG: 100 CAPSULE, LIQUID FILLED ORAL at 17:30

## 2020-08-27 RX ADMIN — TOPIRAMATE 50 MG: 25 TABLET, FILM COATED ORAL at 04:56

## 2020-08-27 RX ADMIN — OXYCODONE HYDROCHLORIDE 10 MG: 5 TABLET ORAL at 19:16

## 2020-08-27 RX ADMIN — GABAPENTIN 300 MG: 300 CAPSULE ORAL at 17:30

## 2020-08-27 RX ADMIN — PREDNISONE 10 MG: 10 TABLET ORAL at 04:56

## 2020-08-27 RX ADMIN — MEROPENEM 500 MG: 500 INJECTION, POWDER, FOR SOLUTION INTRAVENOUS at 05:10

## 2020-08-27 RX ADMIN — POTASSIUM CHLORIDE 20 MEQ: 20 TABLET, EXTENDED RELEASE ORAL at 04:56

## 2020-08-27 RX ADMIN — TRAZODONE HYDROCHLORIDE 100 MG: 100 TABLET ORAL at 21:58

## 2020-08-27 RX ADMIN — IVABRADINE 7.5 MG: 7.5 TABLET, FILM COATED ORAL at 17:30

## 2020-08-27 RX ADMIN — BUSPIRONE HYDROCHLORIDE 10 MG: 10 TABLET ORAL at 21:58

## 2020-08-27 RX ADMIN — PREGABALIN 300 MG: 150 CAPSULE ORAL at 04:55

## 2020-08-27 RX ADMIN — DIBASIC SODIUM PHOSPHATE, MONOBASIC POTASSIUM PHOSPHATE AND MONOBASIC SODIUM PHOSPHATE 250 MG: 852; 155; 130 TABLET ORAL at 17:33

## 2020-08-27 RX ADMIN — MYCOPHENOLATE MOFETIL 1000 MG: 250 CAPSULE ORAL at 04:55

## 2020-08-27 RX ADMIN — LIDOCAINE HYDROCHLORIDE 30 ML: 20 SOLUTION OROPHARYNGEAL at 17:29

## 2020-08-27 RX ADMIN — MINERAL OIL, PETROLATUM 1 APPLICATION: 425; 573 OINTMENT OPHTHALMIC at 13:01

## 2020-08-27 RX ADMIN — SODIUM BICARBONATE 650 MG: 650 TABLET ORAL at 08:02

## 2020-08-27 RX ADMIN — SODIUM BICARBONATE 650 MG: 650 TABLET ORAL at 17:31

## 2020-08-27 RX ADMIN — GABAPENTIN 300 MG: 300 CAPSULE ORAL at 13:01

## 2020-08-27 RX ADMIN — DIBASIC SODIUM PHOSPHATE, MONOBASIC POTASSIUM PHOSPHATE AND MONOBASIC SODIUM PHOSPHATE 250 MG: 852; 155; 130 TABLET ORAL at 21:57

## 2020-08-27 RX ADMIN — ZIPRASIDONE HYDROCHLORIDE 40 MG: 40 CAPSULE ORAL at 04:56

## 2020-08-27 ASSESSMENT — ENCOUNTER SYMPTOMS
DIZZINESS: 0
FOCAL WEAKNESS: 1
HEMOPTYSIS: 0
VOMITING: 0
WHEEZING: 0
ABDOMINAL PAIN: 1
SPUTUM PRODUCTION: 0
FEVER: 0
HEADACHES: 0
CONSTIPATION: 0
SHORTNESS OF BREATH: 0
COUGH: 0

## 2020-08-27 NOTE — PROGRESS NOTES
Assumed care of pt at 0745. Report received and bedside rounding completed with night RN. Pt is calm no SOB, or in any acute distress noted.  Fall precautions in place,  bed alarm. - Treaded non slip socks. Bed locked. Communication board updated with POC. Call light and pt belongings within reach - pt makes needs known - hourly rounding in place. See flowsheets for further assessment.

## 2020-08-27 NOTE — CODE DOCUMENTATION
Patient stating chest pain is 9/10. New chest pain than before.  Stating it feels like pressure around heart and radiating to shoulder and neck.

## 2020-08-27 NOTE — PROGRESS NOTES
Pt complaining of chest pain at start of shift, rapid response called, MD notified  EKG and chest x ray obtained, ekg was normal  Pt VS stable through out the rapid, medicated with 5 of oxi to help with pain  No further complaints overnight, pt was able to sleep comfortably during the night  Bed in low locked position, call light in reach, pt has no other needs at this time,

## 2020-08-27 NOTE — PROGRESS NOTES
Hospital Medicine Daily Progress Note    Date of Service  8/27/2020     Chief Complaint  29 y.o. female admitted 8/15/2020 with blurred vision.    Hospital Course    PMH transverse myelitis, asthma, chronic relapsing inflammatory optic neuropathy, suprapubic cath, DM who presented with HA and blurred vision. CTA head and neck and TTE were unremarkable. Dr. Yousif was consulted, recommended conservative medication management for her transient monocular visual loss. She also was found with UTI ESBL klebsiella. She is unable to return home, grandmother unable to care for her. Case management has been closely involved.       Interval Problem Update  Had CP overnight. Felt like squeezing and radiating to her right shoulder. Denies SOB, pleurisy, worse with movement. Trop and CXR normal. EKG showed sinus, with no ST changes, had prolonged QT.  She was given oxycodone, her pain is better but still present. She is stable on room air. HR 60-70s.  Hematuria has returned, no clots seen and urine flowing well    Consultants/Specialty  Ophtho Dr. Soliz  Neuro-ophtho Dr. Yousif    Code Status  Full Code    Disposition  grandmother unable to care for her  Longterm SNF pending    Review of Systems  Review of Systems   Constitutional: Negative for fever.   Respiratory: Negative for cough, hemoptysis, sputum production, shortness of breath and wheezing.    Cardiovascular: Positive for chest pain.   Gastrointestinal: Positive for abdominal pain (around cath site). Negative for constipation and vomiting.   Genitourinary: Negative for dysuria and hematuria.   Musculoskeletal: Positive for joint pain (right shoulder).   Neurological: Positive for focal weakness (chronic). Negative for dizziness and headaches.        Physical Exam  Temp:  [35.9 °C (96.7 °F)-37 °C (98.6 °F)] 37 °C (98.6 °F)  Pulse:  [63-74] 63  Resp:  [16-18] 17  BP: (103-130)/(58-87) 130/72  SpO2:  [94 %-98 %] 98 %    Physical Exam  Constitutional:       General:  She is not in acute distress.     Appearance: She is obese. She is not toxic-appearing or diaphoretic.   HENT:      Mouth/Throat:      Mouth: Mucous membranes are moist.   Eyes:      General:         Right eye: No discharge.         Left eye: No discharge.   Neck:      Musculoskeletal: Neck supple.   Cardiovascular:      Rate and Rhythm: Normal rate and regular rhythm.   Pulmonary:      Effort: Pulmonary effort is normal. No respiratory distress.      Breath sounds: No stridor. No wheezing, rhonchi or rales.   Chest:      Chest wall: No tenderness.   Abdominal:      Tenderness: There is abdominal tenderness (suprapubic near cath site). There is no guarding or rebound.      Comments: suprapubic cath in place, no drainage seen, mildly erythematous around tube   Musculoskeletal:         General: No swelling.      Comments: Right shoulder tender to palpation, no effusion appreciated, ROM limited by pain   Skin:     General: Skin is warm and dry.   Neurological:      Mental Status: She is alert and oriented to person, place, and time.   Psychiatric:         Mood and Affect: Mood normal.         Behavior: Behavior normal.         Fluids  No intake or output data in the 24 hours ending 08/27/20 1441    Laboratory  Recent Labs     08/26/20  0011   WBC 7.4   RBC 4.31   HEMOGLOBIN 12.6   HEMATOCRIT 39.7   MCV 92.1   MCH 29.2   MCHC 31.7*   RDW 44.0   PLATELETCT 144*   MPV 12.4     Recent Labs     08/26/20  0011 08/27/20  1109   SODIUM 142 136   POTASSIUM 4.0 3.7   CHLORIDE 114* 111   CO2 14* 15*   GLUCOSE 126* 102*   BUN 11 15   CREATININE 0.67 0.63   CALCIUM 8.8 8.8                   Imaging  DX-SHOULDER 2+ LEFT   Final Result      1.  Normal left shoulder series.      DX-CHEST-PORTABLE (1 VIEW)   Final Result         1.  No acute cardiopulmonary disease.      US-BLADDER   Final Result      Suprapubic catheter balloon is inflated, presumably within the collapsed bladder. If more definitive analysis is required, consider  clamping the bladder catheter for  hours prior to repeat ultrasound, allowing the bladder to fill with fluid and therefore    better see the bladder walls      CT-HEAD W/O   Final Result         1.  No acute intracranial abnormality.      DX-WRIST-COMPLETE 3+ RIGHT   Final Result      No acute fracture.  If pain persists, recommend repeat imaging in 7 to 10 days.      EC-ECHOCARDIOGRAM COMPLETE W/ CONT   Final Result      CT-CTA NECK WITH & W/O-POST PROCESSING   Final Result      No high-grade stenosis, large vessel occlusion, aneurysm or dissection.      CT-CTA HEAD WITH & W/O-POST PROCESS   Final Result      No intracranial aneurysm, focal stenosis, or abrupt large vessel cut off.      US-CAROTID DOPPLER BILAT   Final Result      CT-HEAD W/O   Final Result      No CT evidence of acute infarct, hemorrhage or mass.           Assessment/Plan  * Urinary tract infection associated with catheterization of urinary tract (HCC)- (present on admission)  Assessment & Plan  ESBL klebsiella--> IV meropenem x 7 days    Optic neuritis- (present on admission)  Assessment & Plan  Continue outpatient prednisone   ophthalmology consulted, Dr. Aparicio.. signed off  continue home Cellcept     cta head and neck--WNL    Echo.  Within normal limits    hypercoag w./u.. Unremarkable except for a mild elevation of homocystine            Diabetes mellitus type 2 in obese (HCC)- (present on admission)  Assessment & Plan  Lantus decreased to 8U qhs  Follow-up A1c is 5  Glucose well controlled  ISS    Morbidly obese (HCC)- (present on admission)  Assessment & Plan  BMI 42    QT prolongation  Assessment & Plan  Replete for low K and phos  Monitor lytes  I discussed in brief with Dr. Leon, would repeat EKG    Urinary tract infection due to ESBL Klebsiella- (present on admission)  Assessment & Plan  Continue full course of meropenem    Wrist injury, right, initial encounter  Assessment & Plan  Xray negative for fracture    Pain control    Dry  eyes- (present on admission)  Assessment & Plan  Eye lubrication     Pain in both feet- (present on admission)  Assessment & Plan  Pain control with po oxycodone        Idiopathic intracranial hypertension- (present on admission)  Assessment & Plan  History of  She is followed by Dr. Yousif, neuro-ophthalmology   continue home Diamox  Supportive care as suggested by neuro-ophthalmology    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HCC)- (present on admission)  Assessment & Plan  Stable at baseline nonambulatory state,    Blurred vision  Assessment & Plan  Presumed retinopathy  monitor    Mild intermittent asthma without complication- (present on admission)  Assessment & Plan  Intermittent flares, affected by smoke  RT protocol, albuterol PRN    Chest pain  Assessment & Plan  No SOB and stable on room air. Has been on lovenox and PE less likely  With tender right shoulder - XR unremarkable  CXR was normal  EKG showed prolonged QT. H/o SVT, pAfib I will monitor on telemetry  Trial GI cocktail    Presence of suprapubic catheter (HCC)- (present on admission)  Assessment & Plan  Was placed 8/6/2020  Infected  8/25 - some hematuria noted, not affecting outflow, will monitor for now but may need to stop lovenox if it continues to worsens  8/26 - Hematuria resolved. I spoke with Dr. Galicia, would wait 1 month to exchange cath, ok to change with IR after 1 more week  8/27 - Hematuria returned, no clots seen. Stop lovenox dvt ppx for now. Consider CBI if she develop clots or loss of urine flow    History of atrial fibrillation and cardiomyopathy?- (present on admission)  Assessment & Plan  Given her chest pain and prolonged QTC, place on telemetry for monitoring    Schizophrenia (HCC)- (present on admission)  Assessment & Plan  Reported history of   Continue Geodon and Prozac    Peripheral neuropathy and chornic pain syndrome (CMS-HCC)- (present on admission)  Assessment & Plan  At risk of morbid obesity hypoventilation, avoid excessive  medications limiting respiratory drive.       VTE prophylaxis: scds, hematuria

## 2020-08-27 NOTE — PROGRESS NOTES
"Pt c/o \"bladder hurting\", suprapubic cath in place, hematuria noted on drainage bag. Bag to draining to gravity with good output. Stat lock in place.  MD at bedside and notified. New orders received.   "

## 2020-08-27 NOTE — DISCHARGE PLANNING
Spoke To: Tatyana (liaison)  Agency/Facility Name: [facility]  Plan or Request: [facility] called to f/u, referral still in review.

## 2020-08-27 NOTE — CARE PLAN
Problem: Safety  Goal: Will remain free from falls  Outcome: PROGRESSING AS EXPECTED  Note: Pt is a high fall risk. Strip bed alarm in use. Pt calling for assistance appropriately. Hourly rounding in place and non slip socks on. Call light with in reach.      Problem: Knowledge Deficit  Goal: Knowledge of disease process/condition, treatment plan, diagnostic tests, and medications will improve  Note: POC discussed with both patient and MD during AM rounding. Questions answered at bedside.

## 2020-08-27 NOTE — ASSESSMENT & PLAN NOTE
CTA chest neg for PE, small effusions  With tender right shoulder - XR unremarkable  CXR was normal  EKG showed prolonged QT. H/o SVT, pAfib. Telemetry has been unremarkable  No changes with eating, no change with GI cocktail. Added daily omeprazole  She denies having chest pain before. Chart review showed multiple ED visits for chest pains. She takes robaxin several times a day, this is restarted  Over all improving

## 2020-08-28 ENCOUNTER — APPOINTMENT (OUTPATIENT)
Dept: RADIOLOGY | Facility: MEDICAL CENTER | Age: 30
DRG: 123 | End: 2020-08-28
Attending: INTERNAL MEDICINE
Payer: MEDICARE

## 2020-08-28 LAB
ALBUMIN SERPL BCP-MCNC: 3.6 G/DL (ref 3.2–4.9)
ALBUMIN/GLOB SERPL: 2.3 G/DL
ALP SERPL-CCNC: 46 U/L (ref 30–99)
ALT SERPL-CCNC: 39 U/L (ref 2–50)
ANION GAP SERPL CALC-SCNC: 11 MMOL/L (ref 7–16)
AST SERPL-CCNC: 13 U/L (ref 12–45)
BASOPHILS # BLD AUTO: 0.5 % (ref 0–1.8)
BASOPHILS # BLD: 0.03 K/UL (ref 0–0.12)
BILIRUB SERPL-MCNC: <0.2 MG/DL (ref 0.1–1.5)
BUN SERPL-MCNC: 19 MG/DL (ref 8–22)
CALCIUM SERPL-MCNC: 8.5 MG/DL (ref 8.5–10.5)
CHLORIDE SERPL-SCNC: 113 MMOL/L (ref 96–112)
CO2 SERPL-SCNC: 15 MMOL/L (ref 20–33)
CREAT SERPL-MCNC: 0.66 MG/DL (ref 0.5–1.4)
EKG IMPRESSION: NORMAL
EOSINOPHIL # BLD AUTO: 0.11 K/UL (ref 0–0.51)
EOSINOPHIL NFR BLD: 1.7 % (ref 0–6.9)
ERYTHROCYTE [DISTWIDTH] IN BLOOD BY AUTOMATED COUNT: 46.5 FL (ref 35.9–50)
GLOBULIN SER CALC-MCNC: 1.6 G/DL (ref 1.9–3.5)
GLUCOSE BLD-MCNC: 103 MG/DL (ref 65–99)
GLUCOSE SERPL-MCNC: 91 MG/DL (ref 65–99)
HCT VFR BLD AUTO: 40.1 % (ref 37–47)
HGB BLD-MCNC: 12.3 G/DL (ref 12–16)
IMM GRANULOCYTES # BLD AUTO: 0.04 K/UL (ref 0–0.11)
IMM GRANULOCYTES NFR BLD AUTO: 0.6 % (ref 0–0.9)
LIPASE SERPL-CCNC: 35 U/L (ref 11–82)
LYMPHOCYTES # BLD AUTO: 2.09 K/UL (ref 1–4.8)
LYMPHOCYTES NFR BLD: 31.8 % (ref 22–41)
MAGNESIUM SERPL-MCNC: 2.1 MG/DL (ref 1.5–2.5)
MCH RBC QN AUTO: 29.1 PG (ref 27–33)
MCHC RBC AUTO-ENTMCNC: 30.7 G/DL (ref 33.6–35)
MCV RBC AUTO: 94.8 FL (ref 81.4–97.8)
MONOCYTES # BLD AUTO: 0.44 K/UL (ref 0–0.85)
MONOCYTES NFR BLD AUTO: 6.7 % (ref 0–13.4)
NEUTROPHILS # BLD AUTO: 3.87 K/UL (ref 2–7.15)
NEUTROPHILS NFR BLD: 58.7 % (ref 44–72)
NRBC # BLD AUTO: 0 K/UL
NRBC BLD-RTO: 0 /100 WBC
PHOSPHATE SERPL-MCNC: 3.5 MG/DL (ref 2.5–4.5)
PLATELET # BLD AUTO: 144 K/UL (ref 164–446)
PMV BLD AUTO: 12.3 FL (ref 9–12.9)
POTASSIUM SERPL-SCNC: 3.4 MMOL/L (ref 3.6–5.5)
PROT SERPL-MCNC: 5.2 G/DL (ref 6–8.2)
RBC # BLD AUTO: 4.23 M/UL (ref 4.2–5.4)
SODIUM SERPL-SCNC: 139 MMOL/L (ref 135–145)
WBC # BLD AUTO: 6.6 K/UL (ref 4.8–10.8)

## 2020-08-28 PROCEDURE — 700111 HCHG RX REV CODE 636 W/ 250 OVERRIDE (IP): Performed by: HOSPITALIST

## 2020-08-28 PROCEDURE — 97530 THERAPEUTIC ACTIVITIES: CPT

## 2020-08-28 PROCEDURE — 93005 ELECTROCARDIOGRAM TRACING: CPT | Performed by: INTERNAL MEDICINE

## 2020-08-28 PROCEDURE — 700102 HCHG RX REV CODE 250 W/ 637 OVERRIDE(OP): Performed by: HOSPITALIST

## 2020-08-28 PROCEDURE — 700102 HCHG RX REV CODE 250 W/ 637 OVERRIDE(OP): Performed by: INTERNAL MEDICINE

## 2020-08-28 PROCEDURE — 84100 ASSAY OF PHOSPHORUS: CPT

## 2020-08-28 PROCEDURE — 51798 US URINE CAPACITY MEASURE: CPT

## 2020-08-28 PROCEDURE — 700111 HCHG RX REV CODE 636 W/ 250 OVERRIDE (IP): Performed by: INTERNAL MEDICINE

## 2020-08-28 PROCEDURE — 83690 ASSAY OF LIPASE: CPT

## 2020-08-28 PROCEDURE — 76857 US EXAM PELVIC LIMITED: CPT

## 2020-08-28 PROCEDURE — 80053 COMPREHEN METABOLIC PANEL: CPT

## 2020-08-28 PROCEDURE — 700102 HCHG RX REV CODE 250 W/ 637 OVERRIDE(OP): Performed by: NURSE PRACTITIONER

## 2020-08-28 PROCEDURE — A9270 NON-COVERED ITEM OR SERVICE: HCPCS | Performed by: NURSE PRACTITIONER

## 2020-08-28 PROCEDURE — A9270 NON-COVERED ITEM OR SERVICE: HCPCS | Performed by: HOSPITALIST

## 2020-08-28 PROCEDURE — 700105 HCHG RX REV CODE 258: Performed by: INTERNAL MEDICINE

## 2020-08-28 PROCEDURE — 36415 COLL VENOUS BLD VENIPUNCTURE: CPT

## 2020-08-28 PROCEDURE — 93010 ELECTROCARDIOGRAM REPORT: CPT | Performed by: INTERNAL MEDICINE

## 2020-08-28 PROCEDURE — A9270 NON-COVERED ITEM OR SERVICE: HCPCS | Performed by: INTERNAL MEDICINE

## 2020-08-28 PROCEDURE — 99232 SBSQ HOSP IP/OBS MODERATE 35: CPT | Performed by: INTERNAL MEDICINE

## 2020-08-28 PROCEDURE — 770021 HCHG ROOM/CARE - ISO PRIVATE

## 2020-08-28 PROCEDURE — 85025 COMPLETE CBC W/AUTO DIFF WBC: CPT

## 2020-08-28 PROCEDURE — 83735 ASSAY OF MAGNESIUM: CPT

## 2020-08-28 RX ORDER — ONDANSETRON 2 MG/ML
4 INJECTION INTRAMUSCULAR; INTRAVENOUS EVERY 4 HOURS PRN
Status: DISCONTINUED | OUTPATIENT
Start: 2020-08-28 | End: 2020-08-28

## 2020-08-28 RX ORDER — PROCHLORPERAZINE EDISYLATE 5 MG/ML
10 INJECTION INTRAMUSCULAR; INTRAVENOUS EVERY 6 HOURS PRN
Status: DISCONTINUED | OUTPATIENT
Start: 2020-08-28 | End: 2020-09-01

## 2020-08-28 RX ORDER — POTASSIUM CHLORIDE 20 MEQ/1
40 TABLET, EXTENDED RELEASE ORAL 2 TIMES DAILY
Status: COMPLETED | OUTPATIENT
Start: 2020-08-28 | End: 2020-08-28

## 2020-08-28 RX ADMIN — FLUOXETINE 40 MG: 20 CAPSULE ORAL at 06:07

## 2020-08-28 RX ADMIN — POTASSIUM CHLORIDE 40 MEQ: 1500 TABLET, EXTENDED RELEASE ORAL at 17:43

## 2020-08-28 RX ADMIN — IVABRADINE 7.5 MG: 7.5 TABLET, FILM COATED ORAL at 09:17

## 2020-08-28 RX ADMIN — OXCARBAZEPINE 150 MG: 150 TABLET, FILM COATED ORAL at 06:07

## 2020-08-28 RX ADMIN — SODIUM BICARBONATE 650 MG: 650 TABLET ORAL at 09:12

## 2020-08-28 RX ADMIN — PROCHLORPERAZINE EDISYLATE 10 MG: 5 INJECTION INTRAMUSCULAR; INTRAVENOUS at 09:12

## 2020-08-28 RX ADMIN — MEROPENEM 500 MG: 500 INJECTION, POWDER, FOR SOLUTION INTRAVENOUS at 13:27

## 2020-08-28 RX ADMIN — GABAPENTIN 300 MG: 300 CAPSULE ORAL at 17:43

## 2020-08-28 RX ADMIN — PREDNISONE 10 MG: 10 TABLET ORAL at 06:08

## 2020-08-28 RX ADMIN — IVABRADINE 7.5 MG: 7.5 TABLET, FILM COATED ORAL at 17:45

## 2020-08-28 RX ADMIN — MINERAL OIL, PETROLATUM 1 APPLICATION: 425; 573 OINTMENT OPHTHALMIC at 17:51

## 2020-08-28 RX ADMIN — BUSPIRONE HYDROCHLORIDE 10 MG: 10 TABLET ORAL at 09:12

## 2020-08-28 RX ADMIN — GABAPENTIN 300 MG: 300 CAPSULE ORAL at 06:08

## 2020-08-28 RX ADMIN — OXYCODONE HYDROCHLORIDE 10 MG: 5 TABLET ORAL at 17:44

## 2020-08-28 RX ADMIN — POTASSIUM CHLORIDE 40 MEQ: 1500 TABLET, EXTENDED RELEASE ORAL at 09:12

## 2020-08-28 RX ADMIN — DOCUSATE SODIUM 50 MG AND SENNOSIDES 8.6 MG 2 TABLET: 8.6; 5 TABLET, FILM COATED ORAL at 06:08

## 2020-08-28 RX ADMIN — SODIUM BICARBONATE 650 MG: 650 TABLET ORAL at 15:00

## 2020-08-28 RX ADMIN — OXCARBAZEPINE 150 MG: 150 TABLET, FILM COATED ORAL at 17:45

## 2020-08-28 RX ADMIN — MEROPENEM 500 MG: 500 INJECTION, POWDER, FOR SOLUTION INTRAVENOUS at 22:17

## 2020-08-28 RX ADMIN — OXYBUTYNIN CHLORIDE 5 MG: 5 TABLET ORAL at 06:08

## 2020-08-28 RX ADMIN — MYCOPHENOLATE MOFETIL 1000 MG: 250 CAPSULE ORAL at 06:07

## 2020-08-28 RX ADMIN — OXYBUTYNIN CHLORIDE 5 MG: 5 TABLET ORAL at 18:00

## 2020-08-28 RX ADMIN — OXYCODONE HYDROCHLORIDE 10 MG: 5 TABLET ORAL at 13:27

## 2020-08-28 RX ADMIN — GABAPENTIN 300 MG: 300 CAPSULE ORAL at 13:26

## 2020-08-28 RX ADMIN — PREGABALIN 300 MG: 150 CAPSULE ORAL at 06:08

## 2020-08-28 RX ADMIN — LEVOTHYROXINE SODIUM 75 MCG: 0.07 TABLET ORAL at 06:08

## 2020-08-28 RX ADMIN — TOPIRAMATE 50 MG: 25 TABLET, FILM COATED ORAL at 17:44

## 2020-08-28 RX ADMIN — INSULIN GLARGINE 8 UNITS: 100 INJECTION, SOLUTION SUBCUTANEOUS at 17:45

## 2020-08-28 RX ADMIN — MEROPENEM 500 MG: 500 INJECTION, POWDER, FOR SOLUTION INTRAVENOUS at 06:05

## 2020-08-28 RX ADMIN — TRAZODONE HYDROCHLORIDE 100 MG: 100 TABLET ORAL at 22:17

## 2020-08-28 RX ADMIN — ACETAZOLAMIDE 1500 MG: 500 CAPSULE, EXTENDED RELEASE ORAL at 06:07

## 2020-08-28 RX ADMIN — SODIUM BICARBONATE 650 MG: 650 TABLET ORAL at 06:08

## 2020-08-28 RX ADMIN — OXYBUTYNIN CHLORIDE 5 MG: 5 TABLET ORAL at 13:27

## 2020-08-28 RX ADMIN — METHOCARBAMOL TABLETS 750 MG: 750 TABLET, COATED ORAL at 17:45

## 2020-08-28 RX ADMIN — BUSPIRONE HYDROCHLORIDE 10 MG: 10 TABLET ORAL at 22:17

## 2020-08-28 RX ADMIN — DOCUSATE SODIUM 100 MG: 100 CAPSULE, LIQUID FILLED ORAL at 06:08

## 2020-08-28 RX ADMIN — ASPIRIN 81 MG: 81 TABLET, COATED ORAL at 06:08

## 2020-08-28 RX ADMIN — ACETAZOLAMIDE 1000 MG: 500 CAPSULE, EXTENDED RELEASE ORAL at 17:42

## 2020-08-28 RX ADMIN — SODIUM BICARBONATE 650 MG: 650 TABLET ORAL at 22:17

## 2020-08-28 RX ADMIN — ZIPRASIDONE HYDROCHLORIDE 40 MG: 40 CAPSULE ORAL at 17:43

## 2020-08-28 RX ADMIN — MYCOPHENOLATE MOFETIL 1000 MG: 250 CAPSULE ORAL at 17:43

## 2020-08-28 RX ADMIN — ZIPRASIDONE HYDROCHLORIDE 40 MG: 40 CAPSULE ORAL at 06:08

## 2020-08-28 RX ADMIN — PREGABALIN 300 MG: 150 CAPSULE ORAL at 17:43

## 2020-08-28 RX ADMIN — OXYCODONE HYDROCHLORIDE 10 MG: 5 TABLET ORAL at 09:11

## 2020-08-28 RX ADMIN — TOPIRAMATE 50 MG: 25 TABLET, FILM COATED ORAL at 06:08

## 2020-08-28 ASSESSMENT — ENCOUNTER SYMPTOMS
SHORTNESS OF BREATH: 0
HEMOPTYSIS: 0
HEADACHES: 0
FEVER: 0
FOCAL WEAKNESS: 1
SPUTUM PRODUCTION: 0
NAUSEA: 1
ABDOMINAL PAIN: 1
COUGH: 0
MYALGIAS: 1
VOMITING: 0
DIZZINESS: 0
WHEEZING: 0
CONSTIPATION: 0

## 2020-08-28 ASSESSMENT — COGNITIVE AND FUNCTIONAL STATUS - GENERAL
WALKING IN HOSPITAL ROOM: A LOT
DRESSING REGULAR LOWER BODY CLOTHING: A LOT
DRESSING REGULAR UPPER BODY CLOTHING: A LITTLE
MOBILITY SCORE: 20
CLIMB 3 TO 5 STEPS WITH RAILING: A LOT
DAILY ACTIVITIY SCORE: 17
TOILETING: A LOT
SUGGESTED CMS G CODE MODIFIER MOBILITY: CJ
HELP NEEDED FOR BATHING: A LOT
SUGGESTED CMS G CODE MODIFIER DAILY ACTIVITY: CK

## 2020-08-28 ASSESSMENT — GAIT ASSESSMENTS: GAIT LEVEL OF ASSIST: UNABLE TO PARTICIPATE

## 2020-08-28 NOTE — CARE PLAN
Problem: Venous Thromboembolism (VTW)/Deep Vein Thrombosis (DVT) Prevention:  Goal: Patient will participate in Venous Thrombosis (VTE)/Deep Vein Thrombosis (DVT)Prevention Measures  Outcome: PROGRESSING AS EXPECTED     Problem: Fluid Volume:  Goal: Will maintain balanced intake and output  Outcome: PROGRESSING AS EXPECTED     Problem: Pain Management  Goal: Pain level will decrease to patient's comfort goal  Outcome: PROGRESSING AS EXPECTED     Problem: Urinary Elimination:  Goal: Ability to reestablish a normal urinary elimination pattern will improve  Outcome: PROGRESSING SLOWER THAN EXPECTED

## 2020-08-28 NOTE — DISCHARGE PLANNING
1055- Spoke with Tatyana (liaison) about long term care availability, she will continue to review referral.

## 2020-08-28 NOTE — PROGRESS NOTES
Received report and assumed care of patient at 1900. Contact precautions in place. AOx4; Unable to ambulate, wheelchair bound, MAX assist; Tele monitored; Suprapubic catheter in place, light red output - clamped at 2015 for U/S of bladder - educated patient to let RN know when feeling distended so imaging can be completed; PIV patent SL; Provided rescue inhaler r/t wheezing.     POC discussed with patient, all questions and concerns addressed; Bed locked and in lowest position; Alarms active and sounding; Call light and personal belongings within reach; Hourly rounding in place.

## 2020-08-28 NOTE — THERAPY
"Physical Therapy   Daily Treatment     Patient Name: Kristin Balderrama  Age:  29 y.o., Sex:  female  Medical Record #: [id number]  Today's Date: 8/28/2020     Precautions: Fall Risk    Assessment    Pt is well-known to therapy department. When last seen in Feb, 2020, pt was supervised with squat pivot to w/c with inconsistent participation and goals. Today, pt is again supervised with squat pivot to w/c and laughs and says \"No\", when asked if she wanted to try to walk, pt saying \"No, I can't walk\" and refers to having \"B ankle fractures\", but no documentation of that in chart. Pt is at her functional baseline based on what she reports today. Pt has a long history of reporting inconsistent levels of function. Pt reports that her grandmother will not care for her anymore and pt is asking for permanent placement in ECF. Pt is not a candidate for ongoing inpt PT as she is at her functional baseline. Patient will not be actively followed for physical therapy services at this time, however may be seen if requested by physician for 1 more visit within 30 days to address any discharge or equipment needs    Plan    Discharge secondary to no likely benefit.    DC Equipment Recommendations: Unable to determine at this time  Discharge Recommendations: Anticipate that the patient will have no further physical therapy needs after discharge from the hospital (see note, pt is at her baseline functional level. )   Patient will not be actively followed for physical therapy services at this time, however may be seen if requested by physician for 1 more visit within 30 days to address any discharge or equipment needs         Objective       08/28/20 0956   Gait Analysis   Gait Level Of Assist Unable to Participate   Bed Mobility    Supine to Sit Supervised   Scooting Supervised   Rolling Supervised   Functional Mobility   Bed, Chair, Wheelchair Transfer Supervised   Transfer Method Squat Pivot   Short Term Goals    Short Term Goal # 1 " Pt will be supervision level for bed mobility including supine to/from sit by the 3rd PTvisit    Goal Outcome # 1 Goal met   Short Term Goal # 2 B  Pt will perform SB transfer with SPV within 6 visits to [place] home with her Grandmother.    Goal Outcome # 2 B Goal not met  (not met due to no slide board needed)   Short Term Goal # 3 Pt will self propel WC for 20 ft with BUE's by the 3rd PT visit    Goal Outcome # 3 Goal not met   Anticipated Discharge Equipment and Recommendations   DC Equipment Recommendations Unable to determine at this time   Discharge Recommendations Anticipate that the patient will have no further physical therapy needs after discharge from the hospital  (see note, pt is at her baseline functional level. )

## 2020-08-28 NOTE — THERAPY
Occupational Therapy  Daily Treatment     Patient Name: Kristin Balderrama  Age:  29 y.o., Sex:  female  Medical Record #: [id number]  Today's Date: 8/28/2020     Precautions  Precautions: (P) Fall Risk  Comments: poor vision    Assessment     Pt is at or near his/her functional baseline. Pt with no further skilled OT needs in the acute care setting at this time.     Plan    Discharge secondary to goals met.    DC Equipment Recommendations: None  Discharge Recommendations: Pt is currently at her baseline, her grandmother is no longer able to care for her recommend Long term placement.           08/28/20 0938   Activities of Daily Living   Grooming Supervision   Upper Body Dressing Minimal Assist   Lower Body Dressing Moderate Assist  (this is baseline)   Functional Mobility   Bed, Chair, Wheelchair Transfer Supervised  (with squat pivot txf to w/c)   Transfer Method Squat Pivot   Short Term Goals   Short Term Goal # 1 Pt will demo ADL txf w/ CGA   Goal Outcome # 1 Goal met   Short Term Goal # 2 Pt will perform seated grooming w/ SPV   Goal Outcome # 2 Goal met

## 2020-08-28 NOTE — PROGRESS NOTES
Hospital Medicine Daily Progress Note    Date of Service  8/28/2020     Chief Complaint  29 y.o. female admitted 8/15/2020 with blurred vision.    Hospital Course    PMH transverse myelitis, asthma, chronic relapsing inflammatory optic neuropathy, suprapubic cath, DM who presented with HA and blurred vision. CTA head and neck and TTE were unremarkable. Dr. Yousif was consulted, recommended conservative medication management for her transient monocular visual loss. She also was found with UTI ESBL klebsiella. She is unable to return home, grandmother unable to care for her. Case management has been closely involved.       Interval Problem Update  She complains of pain all over, her back, chest, abd, legs. Feels tired and sleepy. She does say her chest pain is better  Denies SOB or cough   She doesn't think GI cocktail helped. She had large BM this morning    Consultants/Specialty  Ophtho Dr. Soliz  Neuro-ophtho Dr. Yousif    Code Status  Full Code    Disposition  grandmother unable to care for her  Longterm SNF pending    Review of Systems  Review of Systems   Constitutional: Positive for malaise/fatigue. Negative for fever.   Respiratory: Negative for cough, hemoptysis, sputum production, shortness of breath and wheezing.    Cardiovascular: Positive for chest pain and leg swelling.   Gastrointestinal: Positive for abdominal pain (around cath site) and nausea. Negative for constipation and vomiting.   Genitourinary: Negative for dysuria.   Musculoskeletal: Positive for joint pain (right shoulder) and myalgias.   Skin: Negative for rash.   Neurological: Positive for focal weakness (chronic). Negative for dizziness and headaches.        Physical Exam  Temp:  [36.1 °C (97 °F)-36.7 °C (98 °F)] 36.2 °C (97.1 °F)  Pulse:  [63-86] 63  Resp:  [16-18] 18  BP: (100-120)/(56-76) 120/72  SpO2:  [92 %-97 %] 97 %    Physical Exam  Constitutional:       Appearance: She is obese. She is not toxic-appearing or diaphoretic.       Comments: Appears fatigued   HENT:      Mouth/Throat:      Mouth: Mucous membranes are moist.   Eyes:      General:         Right eye: No discharge.         Left eye: No discharge.   Neck:      Musculoskeletal: Neck supple.   Cardiovascular:      Rate and Rhythm: Normal rate and regular rhythm.      Pulses: Normal pulses.   Pulmonary:      Effort: Pulmonary effort is normal. No respiratory distress.      Breath sounds: No stridor. No wheezing, rhonchi or rales.   Chest:      Chest wall: No tenderness.   Abdominal:      Tenderness: There is abdominal tenderness (suprapubic near cath site). There is no guarding or rebound.      Comments: suprapubic cath in place, no drainage seen, mildly erythematous around tube   Musculoskeletal:      Comments: Right shoulder tender to palpation, no effusion appreciated, ROM limited by pain  Nonpitting edema to legs   Skin:     General: Skin is warm and dry.   Neurological:      Mental Status: She is alert and oriented to person, place, and time.   Psychiatric:         Mood and Affect: Mood is anxious.         Fluids    Intake/Output Summary (Last 24 hours) at 8/28/2020 1432  Last data filed at 8/28/2020 0508  Gross per 24 hour   Intake --   Output 800 ml   Net -800 ml       Laboratory  Recent Labs     08/26/20  0011 08/28/20  0016   WBC 7.4 6.6   RBC 4.31 4.23   HEMOGLOBIN 12.6 12.3   HEMATOCRIT 39.7 40.1   MCV 92.1 94.8   MCH 29.2 29.1   MCHC 31.7* 30.7*   RDW 44.0 46.5   PLATELETCT 144* 144*   MPV 12.4 12.3     Recent Labs     08/26/20  0011 08/27/20  1109 08/28/20  0016   SODIUM 142 136 139   POTASSIUM 4.0 3.7 3.4*   CHLORIDE 114* 111 113*   CO2 14* 15* 15*   GLUCOSE 126* 102* 91   BUN 11 15 19   CREATININE 0.67 0.63 0.66   CALCIUM 8.8 8.8 8.5                   Imaging  US-BLADDER   Final Result         1.  Right ureteral jet is not definitively identified, otherwise unremarkable exam.      DX-SHOULDER 2+ LEFT   Final Result      1.  Normal left shoulder series.       DX-CHEST-PORTABLE (1 VIEW)   Final Result         1.  No acute cardiopulmonary disease.      US-BLADDER   Final Result      Suprapubic catheter balloon is inflated, presumably within the collapsed bladder. If more definitive analysis is required, consider clamping the bladder catheter for  hours prior to repeat ultrasound, allowing the bladder to fill with fluid and therefore    better see the bladder walls      CT-HEAD W/O   Final Result         1.  No acute intracranial abnormality.      DX-WRIST-COMPLETE 3+ RIGHT   Final Result      No acute fracture.  If pain persists, recommend repeat imaging in 7 to 10 days.      EC-ECHOCARDIOGRAM COMPLETE W/ CONT   Final Result      CT-CTA NECK WITH & W/O-POST PROCESSING   Final Result      No high-grade stenosis, large vessel occlusion, aneurysm or dissection.      CT-CTA HEAD WITH & W/O-POST PROCESS   Final Result      No intracranial aneurysm, focal stenosis, or abrupt large vessel cut off.      US-CAROTID DOPPLER BILAT   Final Result      CT-HEAD W/O   Final Result      No CT evidence of acute infarct, hemorrhage or mass.           Assessment/Plan  * Urinary tract infection associated with catheterization of urinary tract (HCC)- (present on admission)  Assessment & Plan  ESBL klebsiella--> IV meropenem x 7 days    Optic neuritis- (present on admission)  Assessment & Plan  Continue outpatient prednisone   ophthalmology consulted, Dr. Aparicio.. signed off  continue home Cellcept     cta head and neck--WNL    Echo.  Within normal limits    hypercoag w./u.. Unremarkable except for a mild elevation of homocystine            Diabetes mellitus type 2 in obese (HCC)- (present on admission)  Assessment & Plan  Lantus decreased to 8U qhs  Follow-up A1c is 5  Glucose well controlled  ISS    Morbidly obese (HCC)- (present on admission)  Assessment & Plan  BMI 42    QT prolongation  Assessment & Plan  Replete for low K   Monitor lytes  QTC improving    Urinary tract infection due to  ESBL Klebsiella- (present on admission)  Assessment & Plan  Continue full course of meropenem    Wrist injury, right, initial encounter  Assessment & Plan  Xray negative for fracture    Pain control    Dry eyes- (present on admission)  Assessment & Plan  Eye lubrication     Pain in both feet- (present on admission)  Assessment & Plan  Pain control with po oxycodone        Idiopathic intracranial hypertension- (present on admission)  Assessment & Plan  History of  She is followed by Dr. Yousif, neuro-ophthalmology   continue home Diamox  Supportive care as suggested by neuro-ophthalmology    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HCC)- (present on admission)  Assessment & Plan  Stable at baseline nonambulatory state,    Blurred vision  Assessment & Plan  Presumed retinopathy  monitor    Mild intermittent asthma without complication- (present on admission)  Assessment & Plan  Intermittent flares, affected by smoke  RT protocol, albuterol PRN    Chest pain  Assessment & Plan  No SOB and stable on room air and no tachycardia. Had been on lovenox and PE less likely.  With tender right shoulder - XR unremarkable  CXR was normal  EKG showed prolonged QT. H/o SVT, pAfib. Telemetry has been unremarkable  No changes with eating, no change with GI cocktail  She denies having chest pain before. Chart review showed multiple ED visits for chest pains. She takes robaxin several times a day, this is restarted    Presence of suprapubic catheter (HCC)- (present on admission)  Assessment & Plan  Was placed 8/6/2020  Infected  8/25 - some hematuria noted, not affecting outflow, will monitor for now but may need to stop lovenox if it continues to worsens  8/26 - Hematuria resolved. I spoke with Dr. Galicia, would wait 1 month to exchange cath, ok to change with IR after 1 more week  8/27 - Hematuria returned, no clots seen. Stop lovenox dvt ppx for now. Consider CBI if she develop clots or loss of urine flow  8/28 - Hematuria improving. Bladder US  unremarkable. Continue gris irrigation PRN    History of atrial fibrillation and cardiomyopathy?- (present on admission)  Assessment & Plan  telemetry for monitoring    Schizophrenia (HCC)- (present on admission)  Assessment & Plan  Reported history of   Continue Geodon and Prozac    Peripheral neuropathy and chornic pain syndrome (CMS-HCC)- (present on admission)  Assessment & Plan  At risk of morbid obesity hypoventilation, avoid excessive medications limiting respiratory drive.       VTE prophylaxis: scds, hematuria

## 2020-08-29 ENCOUNTER — APPOINTMENT (OUTPATIENT)
Dept: RADIOLOGY | Facility: MEDICAL CENTER | Age: 30
DRG: 123 | End: 2020-08-29
Attending: INTERNAL MEDICINE
Payer: MEDICARE

## 2020-08-29 LAB
ANION GAP SERPL CALC-SCNC: 12 MMOL/L (ref 7–16)
BUN SERPL-MCNC: 17 MG/DL (ref 8–22)
CALCIUM SERPL-MCNC: 8.5 MG/DL (ref 8.5–10.5)
CHLORIDE SERPL-SCNC: 113 MMOL/L (ref 96–112)
CO2 SERPL-SCNC: 15 MMOL/L (ref 20–33)
CREAT SERPL-MCNC: 0.6 MG/DL (ref 0.5–1.4)
EKG IMPRESSION: NORMAL
GLUCOSE BLD-MCNC: 101 MG/DL (ref 65–99)
GLUCOSE SERPL-MCNC: 94 MG/DL (ref 65–99)
MAGNESIUM SERPL-MCNC: 2 MG/DL (ref 1.5–2.5)
PHOSPHATE SERPL-MCNC: 2.7 MG/DL (ref 2.5–4.5)
POTASSIUM SERPL-SCNC: 3.6 MMOL/L (ref 3.6–5.5)
SODIUM SERPL-SCNC: 140 MMOL/L (ref 135–145)

## 2020-08-29 PROCEDURE — 82962 GLUCOSE BLOOD TEST: CPT

## 2020-08-29 PROCEDURE — A9270 NON-COVERED ITEM OR SERVICE: HCPCS | Performed by: NURSE PRACTITIONER

## 2020-08-29 PROCEDURE — 700102 HCHG RX REV CODE 250 W/ 637 OVERRIDE(OP): Performed by: HOSPITALIST

## 2020-08-29 PROCEDURE — 83735 ASSAY OF MAGNESIUM: CPT

## 2020-08-29 PROCEDURE — 99233 SBSQ HOSP IP/OBS HIGH 50: CPT | Performed by: INTERNAL MEDICINE

## 2020-08-29 PROCEDURE — A9270 NON-COVERED ITEM OR SERVICE: HCPCS | Performed by: INTERNAL MEDICINE

## 2020-08-29 PROCEDURE — 71275 CT ANGIOGRAPHY CHEST: CPT

## 2020-08-29 PROCEDURE — 700102 HCHG RX REV CODE 250 W/ 637 OVERRIDE(OP): Performed by: INTERNAL MEDICINE

## 2020-08-29 PROCEDURE — 84100 ASSAY OF PHOSPHORUS: CPT

## 2020-08-29 PROCEDURE — 36415 COLL VENOUS BLD VENIPUNCTURE: CPT

## 2020-08-29 PROCEDURE — 700111 HCHG RX REV CODE 636 W/ 250 OVERRIDE (IP): Performed by: INTERNAL MEDICINE

## 2020-08-29 PROCEDURE — 93005 ELECTROCARDIOGRAM TRACING: CPT | Performed by: INTERNAL MEDICINE

## 2020-08-29 PROCEDURE — 700101 HCHG RX REV CODE 250: Performed by: HOSPITALIST

## 2020-08-29 PROCEDURE — 93010 ELECTROCARDIOGRAM REPORT: CPT | Performed by: INTERNAL MEDICINE

## 2020-08-29 PROCEDURE — 700111 HCHG RX REV CODE 636 W/ 250 OVERRIDE (IP): Performed by: HOSPITALIST

## 2020-08-29 PROCEDURE — A9270 NON-COVERED ITEM OR SERVICE: HCPCS | Performed by: HOSPITALIST

## 2020-08-29 PROCEDURE — 700102 HCHG RX REV CODE 250 W/ 637 OVERRIDE(OP): Performed by: NURSE PRACTITIONER

## 2020-08-29 PROCEDURE — 700117 HCHG RX CONTRAST REV CODE 255: Performed by: INTERNAL MEDICINE

## 2020-08-29 PROCEDURE — 770021 HCHG ROOM/CARE - ISO PRIVATE

## 2020-08-29 PROCEDURE — 94760 N-INVAS EAR/PLS OXIMETRY 1: CPT

## 2020-08-29 PROCEDURE — 80048 BASIC METABOLIC PNL TOTAL CA: CPT

## 2020-08-29 PROCEDURE — 94640 AIRWAY INHALATION TREATMENT: CPT

## 2020-08-29 RX ORDER — ONDANSETRON 2 MG/ML
4 INJECTION INTRAMUSCULAR; INTRAVENOUS EVERY 4 HOURS PRN
Status: DISCONTINUED | OUTPATIENT
Start: 2020-08-29 | End: 2020-09-14 | Stop reason: HOSPADM

## 2020-08-29 RX ORDER — METHOCARBAMOL 750 MG/1
750 TABLET, FILM COATED ORAL 4 TIMES DAILY
Status: DISCONTINUED | OUTPATIENT
Start: 2020-08-29 | End: 2020-09-05

## 2020-08-29 RX ADMIN — GABAPENTIN 300 MG: 300 CAPSULE ORAL at 18:45

## 2020-08-29 RX ADMIN — ALBUTEROL SULFATE 2 PUFF: 90 AEROSOL, METERED RESPIRATORY (INHALATION) at 06:32

## 2020-08-29 RX ADMIN — MYCOPHENOLATE MOFETIL 1000 MG: 250 CAPSULE ORAL at 04:25

## 2020-08-29 RX ADMIN — PREGABALIN 300 MG: 150 CAPSULE ORAL at 04:26

## 2020-08-29 RX ADMIN — IVABRADINE 7.5 MG: 7.5 TABLET, FILM COATED ORAL at 08:54

## 2020-08-29 RX ADMIN — PREDNISONE 10 MG: 10 TABLET ORAL at 04:27

## 2020-08-29 RX ADMIN — SODIUM BICARBONATE 650 MG: 650 TABLET ORAL at 08:55

## 2020-08-29 RX ADMIN — OXYBUTYNIN CHLORIDE 5 MG: 5 TABLET ORAL at 18:46

## 2020-08-29 RX ADMIN — METHOCARBAMOL TABLETS 750 MG: 750 TABLET, COATED ORAL at 18:46

## 2020-08-29 RX ADMIN — ACETAZOLAMIDE 1000 MG: 500 CAPSULE, EXTENDED RELEASE ORAL at 18:46

## 2020-08-29 RX ADMIN — GABAPENTIN 300 MG: 300 CAPSULE ORAL at 12:54

## 2020-08-29 RX ADMIN — OXYBUTYNIN CHLORIDE 5 MG: 5 TABLET ORAL at 04:27

## 2020-08-29 RX ADMIN — SODIUM BICARBONATE 650 MG: 650 TABLET ORAL at 20:13

## 2020-08-29 RX ADMIN — BUSPIRONE HYDROCHLORIDE 10 MG: 10 TABLET ORAL at 20:13

## 2020-08-29 RX ADMIN — MINERAL OIL, PETROLATUM 1 APPLICATION: 425; 573 OINTMENT OPHTHALMIC at 18:45

## 2020-08-29 RX ADMIN — TOPIRAMATE 50 MG: 25 TABLET, FILM COATED ORAL at 18:46

## 2020-08-29 RX ADMIN — BUSPIRONE HYDROCHLORIDE 10 MG: 10 TABLET ORAL at 08:55

## 2020-08-29 RX ADMIN — OXYCODONE HYDROCHLORIDE 10 MG: 5 TABLET ORAL at 12:55

## 2020-08-29 RX ADMIN — METHOCARBAMOL TABLETS 750 MG: 750 TABLET, COATED ORAL at 12:53

## 2020-08-29 RX ADMIN — IPRATROPIUM BROMIDE AND ALBUTEROL SULFATE 3 ML: .5; 3 SOLUTION RESPIRATORY (INHALATION) at 10:25

## 2020-08-29 RX ADMIN — DOCUSATE SODIUM 50 MG AND SENNOSIDES 8.6 MG 2 TABLET: 8.6; 5 TABLET, FILM COATED ORAL at 18:45

## 2020-08-29 RX ADMIN — DIBASIC SODIUM PHOSPHATE, MONOBASIC POTASSIUM PHOSPHATE AND MONOBASIC SODIUM PHOSPHATE 250 MG: 852; 155; 130 TABLET ORAL at 18:44

## 2020-08-29 RX ADMIN — DIBASIC SODIUM PHOSPHATE, MONOBASIC POTASSIUM PHOSPHATE AND MONOBASIC SODIUM PHOSPHATE 250 MG: 852; 155; 130 TABLET ORAL at 08:55

## 2020-08-29 RX ADMIN — ONDANSETRON 4 MG: 2 INJECTION INTRAMUSCULAR; INTRAVENOUS at 10:35

## 2020-08-29 RX ADMIN — DOCUSATE SODIUM 100 MG: 100 CAPSULE, LIQUID FILLED ORAL at 18:46

## 2020-08-29 RX ADMIN — INSULIN GLARGINE 8 UNITS: 100 INJECTION, SOLUTION SUBCUTANEOUS at 18:55

## 2020-08-29 RX ADMIN — OXYCODONE HYDROCHLORIDE 10 MG: 5 TABLET ORAL at 04:26

## 2020-08-29 RX ADMIN — ASPIRIN 81 MG: 81 TABLET, COATED ORAL at 04:26

## 2020-08-29 RX ADMIN — DIBASIC SODIUM PHOSPHATE, MONOBASIC POTASSIUM PHOSPHATE AND MONOBASIC SODIUM PHOSPHATE 250 MG: 852; 155; 130 TABLET ORAL at 12:53

## 2020-08-29 RX ADMIN — LEVOTHYROXINE SODIUM 75 MCG: 0.07 TABLET ORAL at 04:27

## 2020-08-29 RX ADMIN — OXYBUTYNIN CHLORIDE 5 MG: 5 TABLET ORAL at 12:54

## 2020-08-29 RX ADMIN — OXYCODONE HYDROCHLORIDE 10 MG: 5 TABLET ORAL at 18:46

## 2020-08-29 RX ADMIN — ZIPRASIDONE HYDROCHLORIDE 40 MG: 40 CAPSULE ORAL at 04:26

## 2020-08-29 RX ADMIN — DOCUSATE SODIUM 50 MG AND SENNOSIDES 8.6 MG 2 TABLET: 8.6; 5 TABLET, FILM COATED ORAL at 04:26

## 2020-08-29 RX ADMIN — TOPIRAMATE 50 MG: 25 TABLET, FILM COATED ORAL at 04:26

## 2020-08-29 RX ADMIN — IOHEXOL 45 ML: 350 INJECTION, SOLUTION INTRAVENOUS at 12:36

## 2020-08-29 RX ADMIN — FLUOXETINE 40 MG: 20 CAPSULE ORAL at 04:26

## 2020-08-29 RX ADMIN — MINERAL OIL, PETROLATUM 1 APPLICATION: 425; 573 OINTMENT OPHTHALMIC at 12:54

## 2020-08-29 RX ADMIN — OXCARBAZEPINE 150 MG: 150 TABLET, FILM COATED ORAL at 04:27

## 2020-08-29 RX ADMIN — ZIPRASIDONE HYDROCHLORIDE 40 MG: 40 CAPSULE ORAL at 18:46

## 2020-08-29 RX ADMIN — METHOCARBAMOL TABLETS 750 MG: 750 TABLET, COATED ORAL at 20:12

## 2020-08-29 RX ADMIN — MYCOPHENOLATE MOFETIL 1000 MG: 250 CAPSULE ORAL at 18:45

## 2020-08-29 RX ADMIN — GABAPENTIN 300 MG: 300 CAPSULE ORAL at 04:27

## 2020-08-29 RX ADMIN — OXCARBAZEPINE 150 MG: 150 TABLET, FILM COATED ORAL at 18:50

## 2020-08-29 RX ADMIN — SODIUM BICARBONATE 650 MG: 650 TABLET ORAL at 04:26

## 2020-08-29 RX ADMIN — DOCUSATE SODIUM 100 MG: 100 CAPSULE, LIQUID FILLED ORAL at 04:27

## 2020-08-29 RX ADMIN — TRAZODONE HYDROCHLORIDE 100 MG: 100 TABLET ORAL at 20:12

## 2020-08-29 RX ADMIN — ACETAZOLAMIDE 1500 MG: 500 CAPSULE, EXTENDED RELEASE ORAL at 04:25

## 2020-08-29 RX ADMIN — SODIUM BICARBONATE 650 MG: 650 TABLET ORAL at 14:52

## 2020-08-29 RX ADMIN — PREGABALIN 300 MG: 150 CAPSULE ORAL at 18:59

## 2020-08-29 RX ADMIN — OXYCODONE HYDROCHLORIDE 10 MG: 5 TABLET ORAL at 08:55

## 2020-08-29 RX ADMIN — IVABRADINE 7.5 MG: 7.5 TABLET, FILM COATED ORAL at 18:45

## 2020-08-29 ASSESSMENT — ENCOUNTER SYMPTOMS
FOCAL WEAKNESS: 1
DIZZINESS: 0
VOMITING: 0
HEADACHES: 0
WHEEZING: 1
NAUSEA: 1
CONSTIPATION: 0
HEMOPTYSIS: 0
MYALGIAS: 1
ABDOMINAL PAIN: 1
COUGH: 0
SHORTNESS OF BREATH: 1
SPUTUM PRODUCTION: 0

## 2020-08-29 NOTE — CARE PLAN
Problem: Communication  Goal: The ability to communicate needs accurately and effectively will improve  Outcome: PROGRESSING AS EXPECTED     Problem: Safety  Goal: Will remain free from injury  Outcome: PROGRESSING AS EXPECTED     Problem: Venous Thromboembolism (VTW)/Deep Vein Thrombosis (DVT) Prevention:  Goal: Patient will participate in Venous Thrombosis (VTE)/Deep Vein Thrombosis (DVT)Prevention Measures  Outcome: PROGRESSING AS EXPECTED     Problem: Safety  Goal: Will remain free from falls  Outcome: PROGRESSING SLOWER THAN EXPECTED

## 2020-08-29 NOTE — CARE PLAN
Problem: Communication  Goal: The ability to communicate needs accurately and effectively will improve  Outcome: PROGRESSING AS EXPECTED   Pt using call light appropriately   Problem: Safety  Goal: Will remain free from injury  Outcome: PROGRESSING AS EXPECTED   Pt able stand and pivot to wheelchair and back to bed with minimal assistance   Problem: Infection  Goal: Will remain free from infection  Outcome: PROGRESSING AS EXPECTED     Problem: Fluid Volume:  Goal: Will maintain balanced intake and output  Outcome: PROGRESSING AS EXPECTED   Pt feels bladder is filling and ostomy is not draining as it should, bladder scan ordered.

## 2020-08-29 NOTE — CARE PLAN
Problem: Communication  Goal: The ability to communicate needs accurately and effectively will improve  Outcome: PROGRESSING SLOWER THAN EXPECTED     Problem: Safety  Goal: Will remain free from falls  Outcome: PROGRESSING AS EXPECTED     Problem: Infection  Goal: Will remain free from infection  Outcome: PROGRESSING AS EXPECTED     Problem: Knowledge Deficit  Goal: Knowledge of disease process/condition, treatment plan, diagnostic tests, and medications will improve  Outcome: PROGRESSING SLOWER THAN EXPECTED     Problem: Urinary Elimination:  Goal: Ability to reestablish a normal urinary elimination pattern will improve  Outcome: PROGRESSING AS EXPECTED

## 2020-08-29 NOTE — PROGRESS NOTES
Report received. Assessment completed.  Pt is A&O x4. Pt on room air.   Medicating for pain and nausea PRN per MAR   Tele monitor in place  Suprapubic catheter in place,  Pt states she can see blood clots in her urine.  Catheter assessed with clear yellow urine draining, no clots visualized   Last BM 8/28.   Tolerating diet, non compliant with diet order as pt has sugar snacks and sodas at bedside.   Per report pt is wheelchair bound with 2 assist stand pivot to chair.  Call light and belongings within reach. All needs met at this time. Fall Precautions and hourly rounding in place.

## 2020-08-29 NOTE — PROGRESS NOTES
"Received report and assumed care of patient at 1900. AOx4; VSS; Unable to ambulate - wheelchair bound; PIV patent; Suprapubic catheter in place w/positive output; Tele monitored. Patient c/o stabbing chest pain, per patient \"it feels like it normally does,\" Rapid SONYA merritt MD paged, order for stat EKG. Stat EKG completed - sinus rhythm. Patient sleeping. Frequent monitoring/assessments in place.     POC discussed with patient, all questions and concerns addressed; Bed locked and in lowest position; Alarms active and sounding; Call light and personal belongings within reach; Hourly rounding in place.     "

## 2020-08-29 NOTE — PROGRESS NOTES
Pt left unit and returned from CT  No needs at this time  Hourly rounding and call light in place

## 2020-08-29 NOTE — PROGRESS NOTES
Hospital Medicine Daily Progress Note    Date of Service  8/29/2020     Chief Complaint  29 y.o. female admitted 8/15/2020 with blurred vision.    Hospital Course    PMH transverse myelitis, asthma, chronic relapsing inflammatory optic neuropathy, suprapubic cath, DM who presented with HA and blurred vision. CTA head and neck and TTE were unremarkable. Dr. Yousif was consulted, recommended conservative medication management for her transient monocular visual loss. She also was found with UTI ESBL klebsiella. She is unable to return home, grandmother unable to care for her. Case management has been closely involved.       Interval Problem Update  She had worsening chest pain and EKG was done.  She has SOB this morning along with her chest pain. She feels worse today  Still has ongoing pain around bladder. Urine is yellow.    Consultants/Specialty  Ophtho Dr. Soliz  Neuro-ophtho Dr. Yousif    Code Status  Full Code    Disposition  grandmother unable to care for her  Longterm SNF pending    Review of Systems  Review of Systems   Constitutional: Positive for malaise/fatigue.   Respiratory: Positive for shortness of breath and wheezing. Negative for cough, hemoptysis and sputum production.    Cardiovascular: Positive for chest pain and leg swelling.   Gastrointestinal: Positive for abdominal pain (around cath site) and nausea. Negative for constipation and vomiting.   Genitourinary: Negative for dysuria.   Musculoskeletal: Positive for joint pain (right shoulder) and myalgias.   Skin: Negative for rash.   Neurological: Positive for focal weakness (chronic). Negative for dizziness and headaches.        Physical Exam  Temp:  [36.1 °C (97 °F)-36.8 °C (98.2 °F)] 36.6 °C (97.8 °F)  Pulse:  [56-72] 72  Resp:  [16-18] 16  BP: ()/(58-72) 108/64  SpO2:  [91 %-98 %] 98 %    Physical Exam  Constitutional:       Appearance: She is obese. She is not toxic-appearing or diaphoretic.      Comments: Appears fatigued   HENT:       Mouth/Throat:      Mouth: Mucous membranes are moist.   Eyes:      General:         Right eye: No discharge.         Left eye: No discharge.   Neck:      Musculoskeletal: Neck supple.   Cardiovascular:      Rate and Rhythm: Normal rate and regular rhythm.      Pulses: Normal pulses.   Pulmonary:      Breath sounds: No stridor. No wheezing, rhonchi or rales.      Comments: Tachypneic, shallow breathing  Chest:      Chest wall: No tenderness.   Abdominal:      Tenderness: There is abdominal tenderness (suprapubic near cath site). There is no guarding or rebound.      Comments: suprapubic cath in place, no drainage seen, mildly erythematous around tube   Musculoskeletal:      Comments: Nonpitting edema to legs   Skin:     General: Skin is warm and dry.   Neurological:      Mental Status: She is alert and oriented to person, place, and time.   Psychiatric:         Mood and Affect: Mood is anxious.         Fluids    Intake/Output Summary (Last 24 hours) at 8/29/2020 1156  Last data filed at 8/29/2020 0438  Gross per 24 hour   Intake --   Output 1200 ml   Net -1200 ml       Laboratory  Recent Labs     08/28/20  0016   WBC 6.6   RBC 4.23   HEMOGLOBIN 12.3   HEMATOCRIT 40.1   MCV 94.8   MCH 29.1   MCHC 30.7*   RDW 46.5   PLATELETCT 144*   MPV 12.3     Recent Labs     08/27/20  1109 08/28/20  0016 08/29/20  0012   SODIUM 136 139 140   POTASSIUM 3.7 3.4* 3.6   CHLORIDE 111 113* 113*   CO2 15* 15* 15*   GLUCOSE 102* 91 94   BUN 15 19 17   CREATININE 0.63 0.66 0.60   CALCIUM 8.8 8.5 8.5                   Imaging  US-BLADDER   Final Result         1.  Right ureteral jet is not definitively identified, otherwise unremarkable exam.      DX-SHOULDER 2+ LEFT   Final Result      1.  Normal left shoulder series.      DX-CHEST-PORTABLE (1 VIEW)   Final Result         1.  No acute cardiopulmonary disease.      US-BLADDER   Final Result      Suprapubic catheter balloon is inflated, presumably within the collapsed bladder. If more  definitive analysis is required, consider clamping the bladder catheter for  hours prior to repeat ultrasound, allowing the bladder to fill with fluid and therefore    better see the bladder walls      CT-HEAD W/O   Final Result         1.  No acute intracranial abnormality.      DX-WRIST-COMPLETE 3+ RIGHT   Final Result      No acute fracture.  If pain persists, recommend repeat imaging in 7 to 10 days.      EC-ECHOCARDIOGRAM COMPLETE W/ CONT   Final Result      CT-CTA NECK WITH & W/O-POST PROCESSING   Final Result      No high-grade stenosis, large vessel occlusion, aneurysm or dissection.      CT-CTA HEAD WITH & W/O-POST PROCESS   Final Result      No intracranial aneurysm, focal stenosis, or abrupt large vessel cut off.      US-CAROTID DOPPLER BILAT   Final Result      CT-HEAD W/O   Final Result      No CT evidence of acute infarct, hemorrhage or mass.      CT-CTA CHEST PULMONARY ARTERY W/ RECONS    (Results Pending)        Assessment/Plan  * Urinary tract infection associated with catheterization of urinary tract (HCC)- (present on admission)  Assessment & Plan  ESBL klebsiella--> IV meropenem x 7 days    Optic neuritis- (present on admission)  Assessment & Plan  Continue outpatient prednisone   ophthalmology consulted, Dr. Aparicio.. signed off  continue home Cellcept     cta head and neck--WNL    Echo.  Within normal limits    hypercoag w./u.. Unremarkable except for a mild elevation of homocystine            Diabetes mellitus type 2 in obese (HCC)- (present on admission)  Assessment & Plan  Lantus decreased to 8U qhs  Follow-up A1c is 5  Glucose well controlled  ISS    Morbidly obese (HCC)- (present on admission)  Assessment & Plan  BMI 42    QT prolongation  Assessment & Plan  Normalized on repeat EKG  Maintain normal lytes    Urinary tract infection due to ESBL Klebsiella- (present on admission)  Assessment & Plan  Continue full course of meropenem    Wrist injury, right, initial encounter  Assessment &  Plan  Xray negative for fracture    Pain control    Dry eyes- (present on admission)  Assessment & Plan  Eye lubrication     Pain in both feet- (present on admission)  Assessment & Plan  Pain control with po oxycodone        Idiopathic intracranial hypertension- (present on admission)  Assessment & Plan  History of  She is followed by Dr. Yousif, neuro-ophthalmology   continue home Diamox  Supportive care as suggested by neuro-ophthalmology    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HCC)- (present on admission)  Assessment & Plan  Stable at baseline nonambulatory state,    Blurred vision  Assessment & Plan  Presumed retinopathy  monitor    Mild intermittent asthma without complication- (present on admission)  Assessment & Plan  Intermittent flares, affected by smoke  RT protocol, albuterol PRN    Chest pain  Assessment & Plan  No SOB and stable on room air and no tachycardia. Had been on lovenox and PE less likely.  With tender right shoulder - XR unremarkable  CXR was normal  EKG showed prolonged QT. H/o SVT, pAfib. Telemetry has been unremarkable  No changes with eating, no change with GI cocktail  She denies having chest pain before. Chart review showed multiple ED visits for chest pains. She takes robaxin several times a day, this is restarted  Worsening SOB and chest pain, will check CTA chest. Discussed with nursing have RT administer neb    Presence of suprapubic catheter (HCC)- (present on admission)  Assessment & Plan  Was placed 8/6/2020  Infected  8/25 - some hematuria noted, not affecting outflow, will monitor for now but may need to stop lovenox if it continues to worsens  8/26 - Hematuria resolved. I spoke with Dr. Galicia, would wait 1 month to exchange cath, ok to change with IR after 1 more week  8/27 - Hematuria returned, no clots seen. Stop lovenox dvt ppx for now. Consider CBI if she develop clots or loss of urine flow  8/28 - Hematuria improving. Bladder US unremarkable. Continue gris irrigation PRN  8/29 -  Hematuria improved    History of atrial fibrillation and cardiomyopathy?- (present on admission)  Assessment & Plan  telemetry for monitoring    Schizophrenia (HCC)- (present on admission)  Assessment & Plan  Reported history of   Continue Geodon and Prozac    Peripheral neuropathy and chornic pain syndrome (CMS-HCC)- (present on admission)  Assessment & Plan  At risk of morbid obesity hypoventilation, avoid excessive medications limiting respiratory drive.       VTE prophylaxis: scds, hematuria

## 2020-08-30 LAB
AMORPH CRY #/AREA URNS HPF: PRESENT /HPF
ANION GAP SERPL CALC-SCNC: 13 MMOL/L (ref 7–16)
APPEARANCE UR: CLEAR
BACTERIA #/AREA URNS HPF: NEGATIVE /HPF
BASOPHILS # BLD AUTO: 0.5 % (ref 0–1.8)
BASOPHILS # BLD: 0.03 K/UL (ref 0–0.12)
BILIRUB UR QL STRIP.AUTO: NEGATIVE
BUN SERPL-MCNC: 13 MG/DL (ref 8–22)
CALCIUM SERPL-MCNC: 8.8 MG/DL (ref 8.5–10.5)
CAOX CRY #/AREA URNS HPF: ABNORMAL /HPF
CHLORIDE SERPL-SCNC: 110 MMOL/L (ref 96–112)
CO2 SERPL-SCNC: 17 MMOL/L (ref 20–33)
COLOR UR: YELLOW
CREAT SERPL-MCNC: 0.69 MG/DL (ref 0.5–1.4)
EOSINOPHIL # BLD AUTO: 0.14 K/UL (ref 0–0.51)
EOSINOPHIL NFR BLD: 2.3 % (ref 0–6.9)
EPI CELLS #/AREA URNS HPF: NEGATIVE /HPF
ERYTHROCYTE [DISTWIDTH] IN BLOOD BY AUTOMATED COUNT: 44.9 FL (ref 35.9–50)
GLUCOSE SERPL-MCNC: 90 MG/DL (ref 65–99)
GLUCOSE UR STRIP.AUTO-MCNC: 100 MG/DL
HCT VFR BLD AUTO: 37.6 % (ref 37–47)
HGB BLD-MCNC: 12.3 G/DL (ref 12–16)
HYALINE CASTS #/AREA URNS LPF: ABNORMAL /LPF
IMM GRANULOCYTES # BLD AUTO: 0.02 K/UL (ref 0–0.11)
IMM GRANULOCYTES NFR BLD AUTO: 0.3 % (ref 0–0.9)
KETONES UR STRIP.AUTO-MCNC: NEGATIVE MG/DL
LEUKOCYTE ESTERASE UR QL STRIP.AUTO: NEGATIVE
LYMPHOCYTES # BLD AUTO: 1.77 K/UL (ref 1–4.8)
LYMPHOCYTES NFR BLD: 28.7 % (ref 22–41)
MAGNESIUM SERPL-MCNC: 2.1 MG/DL (ref 1.5–2.5)
MCH RBC QN AUTO: 30.2 PG (ref 27–33)
MCHC RBC AUTO-ENTMCNC: 32.7 G/DL (ref 33.6–35)
MCV RBC AUTO: 92.4 FL (ref 81.4–97.8)
MICRO URNS: ABNORMAL
MONOCYTES # BLD AUTO: 0.42 K/UL (ref 0–0.85)
MONOCYTES NFR BLD AUTO: 6.8 % (ref 0–13.4)
NEUTROPHILS # BLD AUTO: 3.78 K/UL (ref 2–7.15)
NEUTROPHILS NFR BLD: 61.4 % (ref 44–72)
NITRITE UR QL STRIP.AUTO: POSITIVE
NRBC # BLD AUTO: 0 K/UL
NRBC BLD-RTO: 0 /100 WBC
PH UR STRIP.AUTO: 7 [PH] (ref 5–8)
PHOSPHATE SERPL-MCNC: 4 MG/DL (ref 2.5–4.5)
PLATELET # BLD AUTO: 143 K/UL (ref 164–446)
PMV BLD AUTO: 12 FL (ref 9–12.9)
POTASSIUM SERPL-SCNC: 3.5 MMOL/L (ref 3.6–5.5)
PROT UR QL STRIP: NEGATIVE MG/DL
RBC # BLD AUTO: 4.07 M/UL (ref 4.2–5.4)
RBC # URNS HPF: ABNORMAL /HPF
RBC UR QL AUTO: ABNORMAL
SODIUM SERPL-SCNC: 140 MMOL/L (ref 135–145)
SP GR UR STRIP.AUTO: 1.01
UROBILINOGEN UR STRIP.AUTO-MCNC: 1 MG/DL
WBC # BLD AUTO: 6.2 K/UL (ref 4.8–10.8)
WBC #/AREA URNS HPF: ABNORMAL /HPF

## 2020-08-30 PROCEDURE — 83735 ASSAY OF MAGNESIUM: CPT

## 2020-08-30 PROCEDURE — 94760 N-INVAS EAR/PLS OXIMETRY 1: CPT

## 2020-08-30 PROCEDURE — 84100 ASSAY OF PHOSPHORUS: CPT

## 2020-08-30 PROCEDURE — A9270 NON-COVERED ITEM OR SERVICE: HCPCS | Performed by: INTERNAL MEDICINE

## 2020-08-30 PROCEDURE — 94640 AIRWAY INHALATION TREATMENT: CPT

## 2020-08-30 PROCEDURE — A9270 NON-COVERED ITEM OR SERVICE: HCPCS | Performed by: HOSPITALIST

## 2020-08-30 PROCEDURE — 81001 URINALYSIS AUTO W/SCOPE: CPT

## 2020-08-30 PROCEDURE — 700111 HCHG RX REV CODE 636 W/ 250 OVERRIDE (IP): Performed by: INTERNAL MEDICINE

## 2020-08-30 PROCEDURE — 700102 HCHG RX REV CODE 250 W/ 637 OVERRIDE(OP): Performed by: HOSPITALIST

## 2020-08-30 PROCEDURE — 99232 SBSQ HOSP IP/OBS MODERATE 35: CPT | Performed by: INTERNAL MEDICINE

## 2020-08-30 PROCEDURE — 700102 HCHG RX REV CODE 250 W/ 637 OVERRIDE(OP): Performed by: INTERNAL MEDICINE

## 2020-08-30 PROCEDURE — 770021 HCHG ROOM/CARE - ISO PRIVATE

## 2020-08-30 PROCEDURE — 700101 HCHG RX REV CODE 250: Performed by: HOSPITALIST

## 2020-08-30 PROCEDURE — 85025 COMPLETE CBC W/AUTO DIFF WBC: CPT

## 2020-08-30 PROCEDURE — 36415 COLL VENOUS BLD VENIPUNCTURE: CPT

## 2020-08-30 PROCEDURE — 80048 BASIC METABOLIC PNL TOTAL CA: CPT

## 2020-08-30 PROCEDURE — 700111 HCHG RX REV CODE 636 W/ 250 OVERRIDE (IP): Performed by: HOSPITALIST

## 2020-08-30 PROCEDURE — A9270 NON-COVERED ITEM OR SERVICE: HCPCS | Performed by: NURSE PRACTITIONER

## 2020-08-30 PROCEDURE — 700102 HCHG RX REV CODE 250 W/ 637 OVERRIDE(OP): Performed by: NURSE PRACTITIONER

## 2020-08-30 RX ORDER — PHENAZOPYRIDINE HYDROCHLORIDE 200 MG/1
100 TABLET, FILM COATED ORAL
Status: COMPLETED | OUTPATIENT
Start: 2020-08-30 | End: 2020-08-31

## 2020-08-30 RX ORDER — POTASSIUM CHLORIDE 20 MEQ/1
40 TABLET, EXTENDED RELEASE ORAL 2 TIMES DAILY
Status: COMPLETED | OUTPATIENT
Start: 2020-08-30 | End: 2020-08-30

## 2020-08-30 RX ORDER — FUROSEMIDE 20 MG/1
20 TABLET ORAL ONCE
Status: COMPLETED | OUTPATIENT
Start: 2020-08-30 | End: 2020-08-30

## 2020-08-30 RX ORDER — MORPHINE SULFATE 4 MG/ML
2 INJECTION, SOLUTION INTRAMUSCULAR; INTRAVENOUS ONCE
Status: COMPLETED | OUTPATIENT
Start: 2020-08-30 | End: 2020-08-30

## 2020-08-30 RX ORDER — OMEPRAZOLE 20 MG/1
20 CAPSULE, DELAYED RELEASE ORAL DAILY
Status: DISCONTINUED | OUTPATIENT
Start: 2020-08-30 | End: 2020-09-14 | Stop reason: HOSPADM

## 2020-08-30 RX ADMIN — INSULIN GLARGINE 8 UNITS: 100 INJECTION, SOLUTION SUBCUTANEOUS at 17:02

## 2020-08-30 RX ADMIN — ALBUTEROL SULFATE 2 PUFF: 90 AEROSOL, METERED RESPIRATORY (INHALATION) at 04:12

## 2020-08-30 RX ADMIN — ASPIRIN 81 MG: 81 TABLET, COATED ORAL at 04:39

## 2020-08-30 RX ADMIN — ACETAZOLAMIDE 1000 MG: 500 CAPSULE, EXTENDED RELEASE ORAL at 16:54

## 2020-08-30 RX ADMIN — SODIUM BICARBONATE 650 MG: 650 TABLET ORAL at 20:38

## 2020-08-30 RX ADMIN — MICONAZOLE NITRATE: 20 CREAM TOPICAL at 16:59

## 2020-08-30 RX ADMIN — LEVOTHYROXINE SODIUM 75 MCG: 0.07 TABLET ORAL at 04:40

## 2020-08-30 RX ADMIN — MORPHINE SULFATE 2 MG: 4 INJECTION INTRAVENOUS at 10:24

## 2020-08-30 RX ADMIN — GABAPENTIN 300 MG: 300 CAPSULE ORAL at 16:54

## 2020-08-30 RX ADMIN — TOPIRAMATE 50 MG: 25 TABLET, FILM COATED ORAL at 04:41

## 2020-08-30 RX ADMIN — OXYCODONE HYDROCHLORIDE 5 MG: 5 TABLET ORAL at 08:31

## 2020-08-30 RX ADMIN — METHOCARBAMOL TABLETS 750 MG: 750 TABLET, COATED ORAL at 16:54

## 2020-08-30 RX ADMIN — GABAPENTIN 300 MG: 300 CAPSULE ORAL at 12:55

## 2020-08-30 RX ADMIN — PHENAZOPYRIDINE HYDROCHLORIDE 100 MG: 200 TABLET ORAL at 16:54

## 2020-08-30 RX ADMIN — SODIUM BICARBONATE 650 MG: 650 TABLET ORAL at 04:40

## 2020-08-30 RX ADMIN — OXYBUTYNIN CHLORIDE 5 MG: 5 TABLET ORAL at 16:54

## 2020-08-30 RX ADMIN — OXCARBAZEPINE 150 MG: 150 TABLET, FILM COATED ORAL at 04:39

## 2020-08-30 RX ADMIN — ZIPRASIDONE HYDROCHLORIDE 40 MG: 40 CAPSULE ORAL at 04:39

## 2020-08-30 RX ADMIN — METHOCARBAMOL TABLETS 750 MG: 750 TABLET, COATED ORAL at 08:27

## 2020-08-30 RX ADMIN — FUROSEMIDE 20 MG: 20 TABLET ORAL at 10:25

## 2020-08-30 RX ADMIN — OXYCODONE HYDROCHLORIDE 10 MG: 5 TABLET ORAL at 00:44

## 2020-08-30 RX ADMIN — TOPIRAMATE 50 MG: 25 TABLET, FILM COATED ORAL at 16:55

## 2020-08-30 RX ADMIN — MINERAL OIL, PETROLATUM 1 APPLICATION: 425; 573 OINTMENT OPHTHALMIC at 16:59

## 2020-08-30 RX ADMIN — OXCARBAZEPINE 150 MG: 150 TABLET, FILM COATED ORAL at 18:00

## 2020-08-30 RX ADMIN — OXYBUTYNIN CHLORIDE 5 MG: 5 TABLET ORAL at 12:55

## 2020-08-30 RX ADMIN — METHOCARBAMOL TABLETS 750 MG: 750 TABLET, COATED ORAL at 12:59

## 2020-08-30 RX ADMIN — SODIUM BICARBONATE 650 MG: 650 TABLET ORAL at 15:00

## 2020-08-30 RX ADMIN — PHENAZOPYRIDINE HYDROCHLORIDE 100 MG: 200 TABLET ORAL at 10:25

## 2020-08-30 RX ADMIN — BUSPIRONE HYDROCHLORIDE 10 MG: 10 TABLET ORAL at 20:38

## 2020-08-30 RX ADMIN — POTASSIUM CHLORIDE 40 MEQ: 1500 TABLET, EXTENDED RELEASE ORAL at 16:54

## 2020-08-30 RX ADMIN — GABAPENTIN 300 MG: 300 CAPSULE ORAL at 04:41

## 2020-08-30 RX ADMIN — MYCOPHENOLATE MOFETIL 1000 MG: 250 CAPSULE ORAL at 04:37

## 2020-08-30 RX ADMIN — IVABRADINE 7.5 MG: 7.5 TABLET, FILM COATED ORAL at 16:54

## 2020-08-30 RX ADMIN — OXYBUTYNIN CHLORIDE 5 MG: 5 TABLET ORAL at 04:40

## 2020-08-30 RX ADMIN — OXYCODONE HYDROCHLORIDE 10 MG: 5 TABLET ORAL at 16:55

## 2020-08-30 RX ADMIN — METHOCARBAMOL TABLETS 750 MG: 750 TABLET, COATED ORAL at 20:38

## 2020-08-30 RX ADMIN — DOCUSATE SODIUM 100 MG: 100 CAPSULE, LIQUID FILLED ORAL at 04:41

## 2020-08-30 RX ADMIN — MICONAZOLE NITRATE: 20 CREAM TOPICAL at 04:42

## 2020-08-30 RX ADMIN — DOCUSATE SODIUM 100 MG: 100 CAPSULE, LIQUID FILLED ORAL at 16:54

## 2020-08-30 RX ADMIN — ACETAMINOPHEN 500 MG: 500 TABLET ORAL at 16:54

## 2020-08-30 RX ADMIN — PREGABALIN 300 MG: 150 CAPSULE ORAL at 04:40

## 2020-08-30 RX ADMIN — BUSPIRONE HYDROCHLORIDE 10 MG: 10 TABLET ORAL at 08:27

## 2020-08-30 RX ADMIN — DOCUSATE SODIUM 50 MG AND SENNOSIDES 8.6 MG 2 TABLET: 8.6; 5 TABLET, FILM COATED ORAL at 04:41

## 2020-08-30 RX ADMIN — POTASSIUM CHLORIDE 40 MEQ: 1500 TABLET, EXTENDED RELEASE ORAL at 08:27

## 2020-08-30 RX ADMIN — PROCHLORPERAZINE EDISYLATE 10 MG: 5 INJECTION INTRAMUSCULAR; INTRAVENOUS at 05:14

## 2020-08-30 RX ADMIN — DOCUSATE SODIUM 50 MG AND SENNOSIDES 8.6 MG 2 TABLET: 8.6; 5 TABLET, FILM COATED ORAL at 16:54

## 2020-08-30 RX ADMIN — FLUOXETINE 40 MG: 20 CAPSULE ORAL at 04:40

## 2020-08-30 RX ADMIN — SODIUM BICARBONATE 650 MG: 650 TABLET ORAL at 08:27

## 2020-08-30 RX ADMIN — IVABRADINE 7.5 MG: 7.5 TABLET, FILM COATED ORAL at 08:27

## 2020-08-30 RX ADMIN — TRAZODONE HYDROCHLORIDE 100 MG: 100 TABLET ORAL at 20:38

## 2020-08-30 RX ADMIN — PREGABALIN 300 MG: 150 CAPSULE ORAL at 20:38

## 2020-08-30 RX ADMIN — PREDNISONE 10 MG: 10 TABLET ORAL at 04:41

## 2020-08-30 RX ADMIN — MINERAL OIL, PETROLATUM 1 APPLICATION: 425; 573 OINTMENT OPHTHALMIC at 04:42

## 2020-08-30 RX ADMIN — IPRATROPIUM BROMIDE AND ALBUTEROL SULFATE 3 ML: .5; 3 SOLUTION RESPIRATORY (INHALATION) at 05:23

## 2020-08-30 RX ADMIN — MINERAL OIL, PETROLATUM 1 APPLICATION: 425; 573 OINTMENT OPHTHALMIC at 12:56

## 2020-08-30 RX ADMIN — ACETAZOLAMIDE 1500 MG: 500 CAPSULE, EXTENDED RELEASE ORAL at 04:39

## 2020-08-30 RX ADMIN — MYCOPHENOLATE MOFETIL 1000 MG: 250 CAPSULE ORAL at 16:53

## 2020-08-30 RX ADMIN — ZIPRASIDONE HYDROCHLORIDE 40 MG: 40 CAPSULE ORAL at 16:54

## 2020-08-30 RX ADMIN — OMEPRAZOLE 20 MG: 20 CAPSULE, DELAYED RELEASE ORAL at 08:27

## 2020-08-30 ASSESSMENT — ENCOUNTER SYMPTOMS
FOCAL WEAKNESS: 1
WHEEZING: 1
SHORTNESS OF BREATH: 1
HEADACHES: 0
SPUTUM PRODUCTION: 0
COUGH: 0
HEMOPTYSIS: 0
MYALGIAS: 1
ABDOMINAL PAIN: 1
CONSTIPATION: 0
DIZZINESS: 0
VOMITING: 0

## 2020-08-30 ASSESSMENT — PAIN SCALES - WONG BAKER
WONGBAKER_NUMERICALRESPONSE: DOESN'T HURT AT ALL
WONGBAKER_NUMERICALRESPONSE: DOESN'T HURT AT ALL

## 2020-08-30 NOTE — PROGRESS NOTES
Hospital Medicine Daily Progress Note    Date of Service  8/30/2020     Chief Complaint  29 y.o. female admitted 8/15/2020 with blurred vision.    Hospital Course    PMH transverse myelitis, asthma, chronic relapsing inflammatory optic neuropathy, suprapubic cath, DM who presented with HA and blurred vision. CTA head and neck and TTE were unremarkable. Dr. Yousif was consulted, recommended conservative medication management for her transient monocular visual loss. She also was found with UTI ESBL klebsiella. She is unable to return home, grandmother unable to care for her. Case management has been closely involved.       Interval Problem Update  Tele with sinus rhythm  HypoK, replete  She had episode of SOB overnight, somewhat improved with neb. CTA chest reviewed with her, will give dose of lasix.  She complains of urine from her urethra that is causing dysuria.. Still has pain around cath site, nursing did deflate and reinflate balloon last night when checking position.    Consultants/Specialty  Ophtho Dr. Soliz  Neuro-ophtho Dr. Yousif    Code Status  Full Code    Disposition  grandmother unable to care for her  Longterm SNF pending    Review of Systems  Review of Systems   Constitutional: Positive for malaise/fatigue.   Respiratory: Positive for shortness of breath and wheezing. Negative for cough, hemoptysis and sputum production.    Cardiovascular: Positive for chest pain and leg swelling.   Gastrointestinal: Positive for abdominal pain (around cath site). Negative for constipation and vomiting.   Genitourinary: Negative for dysuria.   Musculoskeletal: Positive for myalgias.   Skin: Negative for rash.   Neurological: Positive for focal weakness (chronic). Negative for dizziness and headaches.        Physical Exam  Temp:  [36.1 °C (97 °F)-36.9 °C (98.4 °F)] 36.2 °C (97.2 °F)  Pulse:  [65-77] 70  Resp:  [18] 18  BP: (100-120)/(61-80) 106/61  SpO2:  [91 %-95 %] 91 %    Physical Exam  Constitutional:        Appearance: She is obese. She is not toxic-appearing or diaphoretic.      Comments: Appears fatigued   HENT:      Mouth/Throat:      Mouth: Mucous membranes are moist.   Eyes:      General:         Right eye: No discharge.         Left eye: No discharge.   Neck:      Musculoskeletal: Neck supple.   Cardiovascular:      Rate and Rhythm: Normal rate and regular rhythm.      Pulses: Normal pulses.   Pulmonary:      Breath sounds: No stridor. No wheezing, rhonchi or rales.      Comments: Diminished througout  Chest:      Chest wall: No tenderness.   Abdominal:      Tenderness: There is abdominal tenderness (suprapubic near cath site). There is no guarding or rebound.      Comments: suprapubic cath in place, no drainage seen, mildly erythematous around tube   Musculoskeletal:      Comments: Nonpitting edema to legs   Skin:     General: Skin is warm and dry.   Neurological:      Mental Status: She is alert and oriented to person, place, and time.   Psychiatric:         Mood and Affect: Mood is anxious.         Fluids    Intake/Output Summary (Last 24 hours) at 8/30/2020 1448  Last data filed at 8/30/2020 0850  Gross per 24 hour   Intake --   Output 1600 ml   Net -1600 ml       Laboratory  Recent Labs     08/28/20  0016 08/30/20  0042   WBC 6.6 6.2   RBC 4.23 4.07*   HEMOGLOBIN 12.3 12.3   HEMATOCRIT 40.1 37.6   MCV 94.8 92.4   MCH 29.1 30.2   MCHC 30.7* 32.7*   RDW 46.5 44.9   PLATELETCT 144* 143*   MPV 12.3 12.0     Recent Labs     08/28/20  0016 08/29/20  0012 08/30/20  0042   SODIUM 139 140 140   POTASSIUM 3.4* 3.6 3.5*   CHLORIDE 113* 113* 110   CO2 15* 15* 17*   GLUCOSE 91 94 90   BUN 19 17 13   CREATININE 0.66 0.60 0.69   CALCIUM 8.5 8.5 8.8                   Imaging  CT-CTA CHEST PULMONARY ARTERY W/ RECONS   Final Result      No CT evidence of acute pulmonary thromboembolism      Mild splenomegaly      New small pleural effusions            US-BLADDER   Final Result         1.  Right ureteral jet is not  definitively identified, otherwise unremarkable exam.      DX-SHOULDER 2+ LEFT   Final Result      1.  Normal left shoulder series.      DX-CHEST-PORTABLE (1 VIEW)   Final Result         1.  No acute cardiopulmonary disease.      US-BLADDER   Final Result      Suprapubic catheter balloon is inflated, presumably within the collapsed bladder. If more definitive analysis is required, consider clamping the bladder catheter for  hours prior to repeat ultrasound, allowing the bladder to fill with fluid and therefore    better see the bladder walls      CT-HEAD W/O   Final Result         1.  No acute intracranial abnormality.      DX-WRIST-COMPLETE 3+ RIGHT   Final Result      No acute fracture.  If pain persists, recommend repeat imaging in 7 to 10 days.      EC-ECHOCARDIOGRAM COMPLETE W/ CONT   Final Result      CT-CTA NECK WITH & W/O-POST PROCESSING   Final Result      No high-grade stenosis, large vessel occlusion, aneurysm or dissection.      CT-CTA HEAD WITH & W/O-POST PROCESS   Final Result      No intracranial aneurysm, focal stenosis, or abrupt large vessel cut off.      US-CAROTID DOPPLER BILAT   Final Result      CT-HEAD W/O   Final Result      No CT evidence of acute infarct, hemorrhage or mass.           Assessment/Plan  * Urinary tract infection associated with catheterization of urinary tract (HCC)- (present on admission)  Assessment & Plan  ESBL klebsiella--> IV meropenem x 7 days completed    Optic neuritis- (present on admission)  Assessment & Plan  Continue outpatient prednisone   ophthalmology consulted, Dr. Aparicio.. signed off  continue home Cellcept     cta head and neck--WNL    Echo.  Within normal limits    hypercoag w./u.. Unremarkable except for a mild elevation of homocystine            Diabetes mellitus type 2 in obese (HCC)- (present on admission)  Assessment & Plan  Lantus decreased to 8U qhs  Follow-up A1c is 5  Glucose well controlled  ISS    Morbidly obese (HCC)- (present on  admission)  Assessment & Plan  BMI 42    QT prolongation  Assessment & Plan  Normalized on repeat EKG  Maintain normal lytes    Urinary tract infection due to ESBL Klebsiella- (present on admission)  Assessment & Plan  Received 7 days of meropenem  8/30 -  Complains of urethra burning. Will trial pyridium and recheck UA    Wrist injury, right, initial encounter  Assessment & Plan  Xray negative for fracture    Pain control    Dry eyes- (present on admission)  Assessment & Plan  Eye lubrication     Pain in both feet- (present on admission)  Assessment & Plan  Pain control with po oxycodone        Idiopathic intracranial hypertension- (present on admission)  Assessment & Plan  History of  She is followed by Dr. Yousif, neuro-ophthalmology   continue home Diamox  Supportive care as suggested by neuro-ophthalmology    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HCC)- (present on admission)  Assessment & Plan  Stable at baseline nonambulatory state,    Blurred vision  Assessment & Plan  Presumed retinopathy  monitor    Mild intermittent asthma without complication- (present on admission)  Assessment & Plan  Intermittent flares, affected by smoke  RT protocol, albuterol PRN  Lasix 20mg x1 today for her SOB    Chest pain  Assessment & Plan  CTA chest neg for PE, small effusions  With tender right shoulder - XR unremarkable  CXR was normal  EKG showed prolonged QT. H/o SVT, pAfib. Telemetry has been unremarkable  No changes with eating, no change with GI cocktail  She denies having chest pain before. Chart review showed multiple ED visits for chest pains. She takes robaxin several times a day, this is restarted      Presence of suprapubic catheter (HCC)- (present on admission)  Assessment & Plan  Was placed 8/6/2020  Infected  8/25 - some hematuria noted, not affecting outflow, will monitor for now but may need to stop lovenox if it continues to worsens  8/26 - Hematuria resolved. I spoke with Dr. Galicia, would wait 1 month to exchange  cath, ok to change with IR after 1 more week  8/27 - Hematuria returned, no clots seen. Stop lovenox dvt ppx for now. Consider CBI if she develop clots or loss of urine flow  8/28 - Hematuria improving. Bladder US unremarkable. Continue gris irrigation PRN  8/29 - Hematuria improved  8/30 - No hematuria noted.    History of atrial fibrillation and cardiomyopathy?- (present on admission)  Assessment & Plan  telemetry for monitoring    Schizophrenia (HCC)- (present on admission)  Assessment & Plan  Reported history of   Continue Geodon and Prozac    Peripheral neuropathy and chornic pain syndrome (CMS-HCC)- (present on admission)  Assessment & Plan  At risk of morbid obesity hypoventilation, avoid excessive medications limiting respiratory drive.       VTE prophylaxis: scds, hematuria

## 2020-08-30 NOTE — CARE PLAN
Problem: Communication  Goal: The ability to communicate needs accurately and effectively will improve  Outcome: PROGRESSING AS EXPECTED  Note: Pt able to make needs known, uses call light appropriately.     Problem: Safety  Goal: Will remain free from injury  Outcome: PROGRESSING AS EXPECTED  Note: Pt remains free from injury, Floor clear from clutter and cords.  Pt A+OX4, Non-Skid yellow socks, Bed/chair alarm on. Proper signs outside pt door in place. Door remains open.  Pt uses call light appropriately. Call light with in reach of pt.  Hourly rounding in place.   Goal: Will remain free from falls  Outcome: PROGRESSING AS EXPECTED     Problem: Pain Management  Goal: Pain level will decrease to patient's comfort goal  Outcome: PROGRESSING AS EXPECTED  Flowsheets  Taken 8/30/2020 1111  Gutierrez-Mccani Scale: 0   Taken 8/30/2020 1103  Non Verbal Scale:   Calm   Sleeping   Unlabored Breathing  Taken 8/30/2020 1039  Pain Rating Scale (NPRS): 8  Note: PRN pain medication, assisted pt to reposition frequently.  Encouraged rest.

## 2020-08-31 ENCOUNTER — TELEPHONE (OUTPATIENT)
Dept: CARDIOLOGY | Facility: MEDICAL CENTER | Age: 30
End: 2020-08-31

## 2020-08-31 LAB
ALBUMIN SERPL BCP-MCNC: 3.6 G/DL (ref 3.2–4.9)
ALBUMIN/GLOB SERPL: 2.3 G/DL
ALP SERPL-CCNC: 51 U/L (ref 30–99)
ALT SERPL-CCNC: 27 U/L (ref 2–50)
ANION GAP SERPL CALC-SCNC: 14 MMOL/L (ref 7–16)
AST SERPL-CCNC: 8 U/L (ref 12–45)
BASOPHILS # BLD AUTO: 0.6 % (ref 0–1.8)
BASOPHILS # BLD: 0.04 K/UL (ref 0–0.12)
BILIRUB SERPL-MCNC: 0.2 MG/DL (ref 0.1–1.5)
BUN SERPL-MCNC: 12 MG/DL (ref 8–22)
CALCIUM SERPL-MCNC: 8.9 MG/DL (ref 8.5–10.5)
CHLORIDE SERPL-SCNC: 110 MMOL/L (ref 96–112)
CO2 SERPL-SCNC: 17 MMOL/L (ref 20–33)
CREAT SERPL-MCNC: 0.62 MG/DL (ref 0.5–1.4)
EOSINOPHIL # BLD AUTO: 0.18 K/UL (ref 0–0.51)
EOSINOPHIL NFR BLD: 2.7 % (ref 0–6.9)
ERYTHROCYTE [DISTWIDTH] IN BLOOD BY AUTOMATED COUNT: 43.5 FL (ref 35.9–50)
GLOBULIN SER CALC-MCNC: 1.6 G/DL (ref 1.9–3.5)
GLUCOSE BLD-MCNC: 130 MG/DL (ref 65–99)
GLUCOSE SERPL-MCNC: 96 MG/DL (ref 65–99)
HCT VFR BLD AUTO: 37.2 % (ref 37–47)
HGB BLD-MCNC: 11.9 G/DL (ref 12–16)
IMM GRANULOCYTES # BLD AUTO: 0.03 K/UL (ref 0–0.11)
IMM GRANULOCYTES NFR BLD AUTO: 0.4 % (ref 0–0.9)
LYMPHOCYTES # BLD AUTO: 2.12 K/UL (ref 1–4.8)
LYMPHOCYTES NFR BLD: 31.5 % (ref 22–41)
MAGNESIUM SERPL-MCNC: 2 MG/DL (ref 1.5–2.5)
MCH RBC QN AUTO: 29.2 PG (ref 27–33)
MCHC RBC AUTO-ENTMCNC: 32 G/DL (ref 33.6–35)
MCV RBC AUTO: 91.4 FL (ref 81.4–97.8)
MONOCYTES # BLD AUTO: 0.49 K/UL (ref 0–0.85)
MONOCYTES NFR BLD AUTO: 7.3 % (ref 0–13.4)
NEUTROPHILS # BLD AUTO: 3.88 K/UL (ref 2–7.15)
NEUTROPHILS NFR BLD: 57.5 % (ref 44–72)
NRBC # BLD AUTO: 0 K/UL
NRBC BLD-RTO: 0 /100 WBC
PHOSPHATE SERPL-MCNC: 3 MG/DL (ref 2.5–4.5)
PLATELET # BLD AUTO: 141 K/UL (ref 164–446)
PMV BLD AUTO: 12 FL (ref 9–12.9)
POTASSIUM SERPL-SCNC: 3.6 MMOL/L (ref 3.6–5.5)
PROT SERPL-MCNC: 5.2 G/DL (ref 6–8.2)
RBC # BLD AUTO: 4.07 M/UL (ref 4.2–5.4)
SODIUM SERPL-SCNC: 141 MMOL/L (ref 135–145)
WBC # BLD AUTO: 6.7 K/UL (ref 4.8–10.8)

## 2020-08-31 PROCEDURE — A9270 NON-COVERED ITEM OR SERVICE: HCPCS | Performed by: INTERNAL MEDICINE

## 2020-08-31 PROCEDURE — 99232 SBSQ HOSP IP/OBS MODERATE 35: CPT | Performed by: INTERNAL MEDICINE

## 2020-08-31 PROCEDURE — 700111 HCHG RX REV CODE 636 W/ 250 OVERRIDE (IP): Performed by: HOSPITALIST

## 2020-08-31 PROCEDURE — 700102 HCHG RX REV CODE 250 W/ 637 OVERRIDE(OP): Performed by: INTERNAL MEDICINE

## 2020-08-31 PROCEDURE — A9270 NON-COVERED ITEM OR SERVICE: HCPCS | Performed by: HOSPITALIST

## 2020-08-31 PROCEDURE — 700102 HCHG RX REV CODE 250 W/ 637 OVERRIDE(OP): Performed by: HOSPITALIST

## 2020-08-31 PROCEDURE — 94760 N-INVAS EAR/PLS OXIMETRY 1: CPT

## 2020-08-31 PROCEDURE — A9270 NON-COVERED ITEM OR SERVICE: HCPCS | Performed by: NURSE PRACTITIONER

## 2020-08-31 PROCEDURE — 770021 HCHG ROOM/CARE - ISO PRIVATE

## 2020-08-31 PROCEDURE — 700111 HCHG RX REV CODE 636 W/ 250 OVERRIDE (IP): Performed by: INTERNAL MEDICINE

## 2020-08-31 PROCEDURE — 94640 AIRWAY INHALATION TREATMENT: CPT

## 2020-08-31 PROCEDURE — 83735 ASSAY OF MAGNESIUM: CPT

## 2020-08-31 PROCEDURE — 82962 GLUCOSE BLOOD TEST: CPT

## 2020-08-31 PROCEDURE — 700101 HCHG RX REV CODE 250: Performed by: HOSPITALIST

## 2020-08-31 PROCEDURE — 85025 COMPLETE CBC W/AUTO DIFF WBC: CPT

## 2020-08-31 PROCEDURE — 84100 ASSAY OF PHOSPHORUS: CPT

## 2020-08-31 PROCEDURE — 80053 COMPREHEN METABOLIC PANEL: CPT

## 2020-08-31 PROCEDURE — 36415 COLL VENOUS BLD VENIPUNCTURE: CPT

## 2020-08-31 PROCEDURE — 700102 HCHG RX REV CODE 250 W/ 637 OVERRIDE(OP): Performed by: NURSE PRACTITIONER

## 2020-08-31 RX ORDER — FUROSEMIDE 20 MG/1
20 TABLET ORAL ONCE
Status: COMPLETED | OUTPATIENT
Start: 2020-08-31 | End: 2020-08-31

## 2020-08-31 RX ADMIN — BUSPIRONE HYDROCHLORIDE 10 MG: 10 TABLET ORAL at 20:57

## 2020-08-31 RX ADMIN — METHOCARBAMOL TABLETS 750 MG: 750 TABLET, COATED ORAL at 20:57

## 2020-08-31 RX ADMIN — FLUOXETINE 40 MG: 20 CAPSULE ORAL at 04:56

## 2020-08-31 RX ADMIN — IVABRADINE 7.5 MG: 7.5 TABLET, FILM COATED ORAL at 08:02

## 2020-08-31 RX ADMIN — MINERAL OIL, PETROLATUM 1 APPLICATION: 425; 573 OINTMENT OPHTHALMIC at 12:37

## 2020-08-31 RX ADMIN — IPRATROPIUM BROMIDE AND ALBUTEROL SULFATE 3 ML: .5; 3 SOLUTION RESPIRATORY (INHALATION) at 11:44

## 2020-08-31 RX ADMIN — METHOCARBAMOL TABLETS 750 MG: 750 TABLET, COATED ORAL at 08:03

## 2020-08-31 RX ADMIN — OXYCODONE HYDROCHLORIDE 10 MG: 5 TABLET ORAL at 08:03

## 2020-08-31 RX ADMIN — ZIPRASIDONE HYDROCHLORIDE 40 MG: 40 CAPSULE ORAL at 04:55

## 2020-08-31 RX ADMIN — METHOCARBAMOL TABLETS 750 MG: 750 TABLET, COATED ORAL at 17:24

## 2020-08-31 RX ADMIN — OXYCODONE HYDROCHLORIDE 10 MG: 5 TABLET ORAL at 17:25

## 2020-08-31 RX ADMIN — GABAPENTIN 300 MG: 300 CAPSULE ORAL at 04:56

## 2020-08-31 RX ADMIN — MINERAL OIL, PETROLATUM 1 APPLICATION: 425; 573 OINTMENT OPHTHALMIC at 17:26

## 2020-08-31 RX ADMIN — TOPIRAMATE 50 MG: 25 TABLET, FILM COATED ORAL at 17:23

## 2020-08-31 RX ADMIN — SODIUM BICARBONATE 650 MG: 650 TABLET ORAL at 20:57

## 2020-08-31 RX ADMIN — FUROSEMIDE 20 MG: 20 TABLET ORAL at 12:36

## 2020-08-31 RX ADMIN — OXYCODONE HYDROCHLORIDE 10 MG: 5 TABLET ORAL at 12:36

## 2020-08-31 RX ADMIN — MYCOPHENOLATE MOFETIL 1000 MG: 250 CAPSULE ORAL at 04:56

## 2020-08-31 RX ADMIN — LEVOTHYROXINE SODIUM 75 MCG: 0.07 TABLET ORAL at 04:55

## 2020-08-31 RX ADMIN — TOPIRAMATE 50 MG: 25 TABLET, FILM COATED ORAL at 04:56

## 2020-08-31 RX ADMIN — ACETAZOLAMIDE 1000 MG: 500 CAPSULE, EXTENDED RELEASE ORAL at 17:23

## 2020-08-31 RX ADMIN — MICONAZOLE NITRATE: 20 CREAM TOPICAL at 17:26

## 2020-08-31 RX ADMIN — SODIUM BICARBONATE 650 MG: 650 TABLET ORAL at 08:03

## 2020-08-31 RX ADMIN — MICONAZOLE NITRATE: 20 CREAM TOPICAL at 04:56

## 2020-08-31 RX ADMIN — OXYBUTYNIN CHLORIDE 5 MG: 5 TABLET ORAL at 17:24

## 2020-08-31 RX ADMIN — PREDNISONE 10 MG: 10 TABLET ORAL at 04:56

## 2020-08-31 RX ADMIN — OXCARBAZEPINE 150 MG: 150 TABLET, FILM COATED ORAL at 04:56

## 2020-08-31 RX ADMIN — ACETAZOLAMIDE 1500 MG: 500 CAPSULE, EXTENDED RELEASE ORAL at 04:55

## 2020-08-31 RX ADMIN — OXYBUTYNIN CHLORIDE 5 MG: 5 TABLET ORAL at 12:36

## 2020-08-31 RX ADMIN — GABAPENTIN 300 MG: 300 CAPSULE ORAL at 17:24

## 2020-08-31 RX ADMIN — PHENAZOPYRIDINE HYDROCHLORIDE 100 MG: 200 TABLET ORAL at 08:03

## 2020-08-31 RX ADMIN — ASPIRIN 81 MG: 81 TABLET, COATED ORAL at 04:56

## 2020-08-31 RX ADMIN — METHOCARBAMOL TABLETS 750 MG: 750 TABLET, COATED ORAL at 12:36

## 2020-08-31 RX ADMIN — ZIPRASIDONE HYDROCHLORIDE 40 MG: 40 CAPSULE ORAL at 17:23

## 2020-08-31 RX ADMIN — BUSPIRONE HYDROCHLORIDE 10 MG: 10 TABLET ORAL at 08:04

## 2020-08-31 RX ADMIN — IVABRADINE 7.5 MG: 7.5 TABLET, FILM COATED ORAL at 17:24

## 2020-08-31 RX ADMIN — MINERAL OIL, PETROLATUM 1 APPLICATION: 425; 573 OINTMENT OPHTHALMIC at 04:56

## 2020-08-31 RX ADMIN — OXCARBAZEPINE 150 MG: 150 TABLET, FILM COATED ORAL at 17:27

## 2020-08-31 RX ADMIN — TRAZODONE HYDROCHLORIDE 100 MG: 100 TABLET ORAL at 20:58

## 2020-08-31 RX ADMIN — SODIUM BICARBONATE 650 MG: 650 TABLET ORAL at 04:55

## 2020-08-31 RX ADMIN — SODIUM BICARBONATE 650 MG: 650 TABLET ORAL at 17:23

## 2020-08-31 RX ADMIN — OMEPRAZOLE 20 MG: 20 CAPSULE, DELAYED RELEASE ORAL at 04:56

## 2020-08-31 RX ADMIN — PREGABALIN 300 MG: 150 CAPSULE ORAL at 04:55

## 2020-08-31 RX ADMIN — INSULIN GLARGINE 8 UNITS: 100 INJECTION, SOLUTION SUBCUTANEOUS at 17:32

## 2020-08-31 RX ADMIN — OXYBUTYNIN CHLORIDE 5 MG: 5 TABLET ORAL at 04:55

## 2020-08-31 RX ADMIN — MYCOPHENOLATE MOFETIL 1000 MG: 250 CAPSULE ORAL at 17:23

## 2020-08-31 RX ADMIN — GABAPENTIN 300 MG: 300 CAPSULE ORAL at 12:36

## 2020-08-31 RX ADMIN — PREGABALIN 300 MG: 150 CAPSULE ORAL at 17:23

## 2020-08-31 ASSESSMENT — ENCOUNTER SYMPTOMS
SPUTUM PRODUCTION: 0
DIZZINESS: 0
WHEEZING: 0
MYALGIAS: 1
SHORTNESS OF BREATH: 1
HEMOPTYSIS: 0
CONSTIPATION: 0
ABDOMINAL PAIN: 1
COUGH: 0
HEADACHES: 0
VOMITING: 0
FOCAL WEAKNESS: 1

## 2020-08-31 ASSESSMENT — PAIN SCALES - WONG BAKER
WONGBAKER_NUMERICALRESPONSE: DOESN'T HURT AT ALL

## 2020-08-31 NOTE — PROGRESS NOTES
Pt request PIV to be removed due to irritation, Dr Chaparro medeiros'd IV to be removed. Okay with pt not have PIV at this time.

## 2020-08-31 NOTE — CARE PLAN
Problem: Communication  Goal: The ability to communicate needs accurately and effectively will improve  Outcome: PROGRESSING AS EXPECTED  Note: Pt able to make needs known, uses call light appropriately.      Problem: Safety  Goal: Will remain free from injury  Outcome: PROGRESSING AS EXPECTED  Note: Pt remains free from injury, Floor clear from clutter and cords.  Pt A+OX4, Non-Skid yellow socks, Bed/chair alarm on. Proper signs outside pt door in place. Door remains open.  Pt uses call light appropriately. Call light with in reach of pt.  Hourly rounding in place.   Goal: Will remain free from falls  Outcome: PROGRESSING AS EXPECTED     Problem: Infection  Goal: Will remain free from infection  Outcome: PROGRESSING AS EXPECTED  Note: Pt remains a fibril

## 2020-08-31 NOTE — TELEPHONE ENCOUNTER
Notify the patient that her cardiac event recorder is unremarkable.  No arrhythmias.  No additional cardiac recommendations.

## 2020-08-31 NOTE — DISCHARGE PLANNING
[address] [place] [address] spoke with Tatyana (liaison) and she stated referral would need to go to upper management for additional review.

## 2020-08-31 NOTE — PROGRESS NOTES
Hospital Medicine Daily Progress Note    Date of Service  8/31/2020     Chief Complaint  29 y.o. female admitted 8/15/2020 with blurred vision.    Hospital Course    PMH transverse myelitis, asthma, chronic relapsing inflammatory optic neuropathy, suprapubic cath, DM who presented with HA and blurred vision. CTA head and neck and TTE were unremarkable. Dr. Yousif was consulted, recommended conservative medication management for her transient monocular visual loss. She also was found with UTI ESBL klebsiella. She is unable to return home, grandmother unable to care for her. Case management has been closely involved.       Interval Problem Update  Her dysuria improved with pyridium.  She still has some SOB but is better  Her pain over all is better today    Consultants/Specialty  Ophtho Dr. Soliz  Neuro-ophtho Dr. Yousif    Code Status  Full Code    Disposition  grandmother unable to care for her  Longterm SNF pending    Review of Systems  Review of Systems   Constitutional: Positive for malaise/fatigue.   Respiratory: Positive for shortness of breath. Negative for cough, hemoptysis, sputum production and wheezing.    Cardiovascular: Positive for chest pain and leg swelling.   Gastrointestinal: Positive for abdominal pain (around cath site). Negative for constipation and vomiting.   Genitourinary: Negative for dysuria.   Musculoskeletal: Positive for myalgias.   Skin: Negative for rash.   Neurological: Positive for focal weakness (chronic). Negative for dizziness and headaches.        Physical Exam  Temp:  [36 °C (96.8 °F)-36.4 °C (97.6 °F)] 36.4 °C (97.6 °F)  Pulse:  [64-75] 65  Resp:  [16-18] 18  BP: ()/(54-71) 111/71  SpO2:  [92 %-94 %] 93 %    Physical Exam  Constitutional:       Appearance: She is obese. She is not toxic-appearing or diaphoretic.      Comments: Appears fatigued   HENT:      Mouth/Throat:      Mouth: Mucous membranes are moist.   Eyes:      General:         Right eye: No discharge.          Left eye: No discharge.   Neck:      Musculoskeletal: Neck supple.   Cardiovascular:      Rate and Rhythm: Normal rate and regular rhythm.      Pulses: Normal pulses.   Pulmonary:      Breath sounds: No stridor. No wheezing, rhonchi or rales.      Comments: Diminished througout  Chest:      Chest wall: No tenderness.   Abdominal:      Tenderness: There is abdominal tenderness (suprapubic near cath site). There is no guarding or rebound.      Comments: suprapubic cath in place, no drainage seen, mildly erythematous around tube   Musculoskeletal:      Comments: Nonpitting edema to legs   Skin:     General: Skin is warm and dry.   Neurological:      Mental Status: She is alert and oriented to person, place, and time.   Psychiatric:         Mood and Affect: Mood is anxious.         Fluids    Intake/Output Summary (Last 24 hours) at 8/31/2020 1545  Last data filed at 8/31/2020 1318  Gross per 24 hour   Intake 957 ml   Output 1200 ml   Net -243 ml       Laboratory  Recent Labs     08/30/20  0042 08/31/20  0048   WBC 6.2 6.7   RBC 4.07* 4.07*   HEMOGLOBIN 12.3 11.9*   HEMATOCRIT 37.6 37.2   MCV 92.4 91.4   MCH 30.2 29.2   MCHC 32.7* 32.0*   RDW 44.9 43.5   PLATELETCT 143* 141*   MPV 12.0 12.0     Recent Labs     08/29/20  0012 08/30/20  0042 08/31/20  0048   SODIUM 140 140 141   POTASSIUM 3.6 3.5* 3.6   CHLORIDE 113* 110 110   CO2 15* 17* 17*   GLUCOSE 94 90 96   BUN 17 13 12   CREATININE 0.60 0.69 0.62   CALCIUM 8.5 8.8 8.9                   Imaging  CT-CTA CHEST PULMONARY ARTERY W/ RECONS   Final Result      No CT evidence of acute pulmonary thromboembolism      Mild splenomegaly      New small pleural effusions            US-BLADDER   Final Result         1.  Right ureteral jet is not definitively identified, otherwise unremarkable exam.      DX-SHOULDER 2+ LEFT   Final Result      1.  Normal left shoulder series.      DX-CHEST-PORTABLE (1 VIEW)   Final Result         1.  No acute cardiopulmonary disease.       US-BLADDER   Final Result      Suprapubic catheter balloon is inflated, presumably within the collapsed bladder. If more definitive analysis is required, consider clamping the bladder catheter for  hours prior to repeat ultrasound, allowing the bladder to fill with fluid and therefore    better see the bladder walls      CT-HEAD W/O   Final Result         1.  No acute intracranial abnormality.      DX-WRIST-COMPLETE 3+ RIGHT   Final Result      No acute fracture.  If pain persists, recommend repeat imaging in 7 to 10 days.      EC-ECHOCARDIOGRAM COMPLETE W/ CONT   Final Result      CT-CTA NECK WITH & W/O-POST PROCESSING   Final Result      No high-grade stenosis, large vessel occlusion, aneurysm or dissection.      CT-CTA HEAD WITH & W/O-POST PROCESS   Final Result      No intracranial aneurysm, focal stenosis, or abrupt large vessel cut off.      US-CAROTID DOPPLER BILAT   Final Result      CT-HEAD W/O   Final Result      No CT evidence of acute infarct, hemorrhage or mass.           Assessment/Plan  * Urinary tract infection associated with catheterization of urinary tract (HCC)- (present on admission)  Assessment & Plan  ESBL klebsiella--> IV meropenem x 7 days completed    Optic neuritis- (present on admission)  Assessment & Plan  Continue outpatient prednisone   ophthalmology consulted, Dr. Aparicio.. signed off  continue home Cellcept   cta head and neck--WNL  Echo.  Within normal limits  hypercoag w./u.. Unremarkable except for a mild elevation of homocystine            Diabetes mellitus type 2 in obese (HCC)- (present on admission)  Assessment & Plan  Lantus decreased to 8U qhs  Follow-up A1c is 5  Glucose well controlled  ISS    Morbidly obese (HCC)- (present on admission)  Assessment & Plan  BMI 42    QT prolongation  Assessment & Plan  Normalized on repeat EKG  Maintain normal lytes    Urinary tract infection due to ESBL Klebsiella- (present on admission)  Assessment & Plan  Received 7 days of  meropenem  8/30 -  Complains of urethra burning. Will trial pyridium and recheck UA  8/31 - UA neg for WBC and bacteria    Wrist injury, right, initial encounter  Assessment & Plan  Xray negative for fracture    Pain control    Dry eyes- (present on admission)  Assessment & Plan  Eye lubrication     Pain in both feet- (present on admission)  Assessment & Plan  Pain control with po oxycodone        Idiopathic intracranial hypertension- (present on admission)  Assessment & Plan  History of  She is followed by Dr. Yousif, neuro-ophthalmology   continue home Diamox  Supportive care as suggested by neuro-ophthalmology    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HCC)- (present on admission)  Assessment & Plan  Stable at baseline nonambulatory state,    Blurred vision  Assessment & Plan  Presumed retinopathy  monitor    Mild intermittent asthma without complication- (present on admission)  Assessment & Plan  Intermittent flares, affected by smoke  RT protocol, albuterol PRN  Small effusions on CTA chest  Repeat lasix 20mg x1 today for her SOB    Chest pain  Assessment & Plan  CTA chest neg for PE, small effusions  With tender right shoulder - XR unremarkable  CXR was normal  EKG showed prolonged QT. H/o SVT, pAfib. Telemetry has been unremarkable  No changes with eating, no change with GI cocktail. Added daily omeprazole  She denies having chest pain before. Chart review showed multiple ED visits for chest pains. She takes robaxin several times a day, this is restarted  Over all improving      Presence of suprapubic catheter (HCC)- (present on admission)  Assessment & Plan  Was placed 8/6/2020  Infected  Intermittent hematuria. Bladder scans have been umremarkable. Hematuria improved after stopping lovenox dvt ppx.  8/26 - I spoke with Dr. Galicia, would wait 1 month to exchange cath, ok to change with IR after 1 more week, recommends upsizing      History of atrial fibrillation and cardiomyopathy?- (present on admission)  Assessment &  Plan  No events on telemetry, will stop    Schizophrenia (HCC)- (present on admission)  Assessment & Plan  Reported history of   Continue Geodon and Prozac    Peripheral neuropathy and chornic pain syndrome (CMS-HCC)- (present on admission)  Assessment & Plan  At risk of morbid obesity hypoventilation, avoid excessive medications limiting respiratory drive.       VTE prophylaxis: scds, hematuria

## 2020-09-01 PROBLEM — R19.7 DIARRHEA: Status: ACTIVE | Noted: 2020-09-01

## 2020-09-01 LAB
C DIFF DNA SPEC QL NAA+PROBE: NEGATIVE
C DIFF TOX GENS STL QL NAA+PROBE: NEGATIVE
GLUCOSE BLD-MCNC: 114 MG/DL (ref 65–99)

## 2020-09-01 PROCEDURE — 700102 HCHG RX REV CODE 250 W/ 637 OVERRIDE(OP): Performed by: HOSPITALIST

## 2020-09-01 PROCEDURE — A9270 NON-COVERED ITEM OR SERVICE: HCPCS | Performed by: INTERNAL MEDICINE

## 2020-09-01 PROCEDURE — 302118 SHAMPOO,NO RINSE: Performed by: INTERNAL MEDICINE

## 2020-09-01 PROCEDURE — 700111 HCHG RX REV CODE 636 W/ 250 OVERRIDE (IP): Performed by: NURSE PRACTITIONER

## 2020-09-01 PROCEDURE — A9270 NON-COVERED ITEM OR SERVICE: HCPCS | Performed by: HOSPITALIST

## 2020-09-01 PROCEDURE — 700102 HCHG RX REV CODE 250 W/ 637 OVERRIDE(OP): Performed by: INTERNAL MEDICINE

## 2020-09-01 PROCEDURE — 700102 HCHG RX REV CODE 250 W/ 637 OVERRIDE(OP): Performed by: NURSE PRACTITIONER

## 2020-09-01 PROCEDURE — 51798 US URINE CAPACITY MEASURE: CPT

## 2020-09-01 PROCEDURE — 700111 HCHG RX REV CODE 636 W/ 250 OVERRIDE (IP): Performed by: INTERNAL MEDICINE

## 2020-09-01 PROCEDURE — 99232 SBSQ HOSP IP/OBS MODERATE 35: CPT | Performed by: INTERNAL MEDICINE

## 2020-09-01 PROCEDURE — 770021 HCHG ROOM/CARE - ISO PRIVATE

## 2020-09-01 PROCEDURE — 700111 HCHG RX REV CODE 636 W/ 250 OVERRIDE (IP): Performed by: HOSPITALIST

## 2020-09-01 PROCEDURE — 82962 GLUCOSE BLOOD TEST: CPT

## 2020-09-01 PROCEDURE — 87493 C DIFF AMPLIFIED PROBE: CPT

## 2020-09-01 PROCEDURE — A9270 NON-COVERED ITEM OR SERVICE: HCPCS | Performed by: NURSE PRACTITIONER

## 2020-09-01 RX ORDER — PROCHLORPERAZINE MALEATE 5 MG/1
10 TABLET ORAL EVERY 6 HOURS PRN
Status: DISCONTINUED | OUTPATIENT
Start: 2020-09-01 | End: 2020-09-14 | Stop reason: HOSPADM

## 2020-09-01 RX ORDER — LOPERAMIDE HYDROCHLORIDE 2 MG/1
2 CAPSULE ORAL 4 TIMES DAILY PRN
Status: DISCONTINUED | OUTPATIENT
Start: 2020-09-01 | End: 2020-09-14 | Stop reason: HOSPADM

## 2020-09-01 RX ORDER — ONDANSETRON 4 MG/1
4 TABLET, ORALLY DISINTEGRATING ORAL EVERY 4 HOURS PRN
Status: DISCONTINUED | OUTPATIENT
Start: 2020-09-01 | End: 2020-09-14 | Stop reason: HOSPADM

## 2020-09-01 RX ADMIN — OXYBUTYNIN CHLORIDE 5 MG: 5 TABLET ORAL at 06:18

## 2020-09-01 RX ADMIN — MYCOPHENOLATE MOFETIL 1000 MG: 250 CAPSULE ORAL at 06:16

## 2020-09-01 RX ADMIN — MYCOPHENOLATE MOFETIL 1000 MG: 250 CAPSULE ORAL at 17:17

## 2020-09-01 RX ADMIN — BUSPIRONE HYDROCHLORIDE 10 MG: 10 TABLET ORAL at 20:46

## 2020-09-01 RX ADMIN — PROCHLORPERAZINE MALEATE 10 MG: 10 TABLET ORAL at 14:02

## 2020-09-01 RX ADMIN — IVABRADINE 7.5 MG: 7.5 TABLET, FILM COATED ORAL at 17:17

## 2020-09-01 RX ADMIN — ONDANSETRON 4 MG: 4 TABLET, ORALLY DISINTEGRATING ORAL at 06:14

## 2020-09-01 RX ADMIN — ZIPRASIDONE HYDROCHLORIDE 40 MG: 40 CAPSULE ORAL at 17:17

## 2020-09-01 RX ADMIN — LEVOTHYROXINE SODIUM 75 MCG: 0.07 TABLET ORAL at 06:17

## 2020-09-01 RX ADMIN — GABAPENTIN 300 MG: 300 CAPSULE ORAL at 06:17

## 2020-09-01 RX ADMIN — BUSPIRONE HYDROCHLORIDE 10 MG: 10 TABLET ORAL at 07:31

## 2020-09-01 RX ADMIN — SODIUM BICARBONATE 650 MG: 650 TABLET ORAL at 20:46

## 2020-09-01 RX ADMIN — TOPIRAMATE 50 MG: 25 TABLET, FILM COATED ORAL at 06:18

## 2020-09-01 RX ADMIN — OXYCODONE HYDROCHLORIDE 10 MG: 5 TABLET ORAL at 07:31

## 2020-09-01 RX ADMIN — ASPIRIN 81 MG: 81 TABLET, COATED ORAL at 06:17

## 2020-09-01 RX ADMIN — PREDNISONE 10 MG: 10 TABLET ORAL at 06:17

## 2020-09-01 RX ADMIN — GABAPENTIN 300 MG: 300 CAPSULE ORAL at 12:00

## 2020-09-01 RX ADMIN — MINERAL OIL, PETROLATUM 1 APPLICATION: 425; 573 OINTMENT OPHTHALMIC at 06:15

## 2020-09-01 RX ADMIN — OXYCODONE HYDROCHLORIDE 10 MG: 5 TABLET ORAL at 12:46

## 2020-09-01 RX ADMIN — GABAPENTIN 300 MG: 300 CAPSULE ORAL at 17:17

## 2020-09-01 RX ADMIN — LOPERAMIDE HYDROCHLORIDE 2 MG: 2 CAPSULE ORAL at 12:46

## 2020-09-01 RX ADMIN — SODIUM BICARBONATE 650 MG: 650 TABLET ORAL at 10:06

## 2020-09-01 RX ADMIN — METHOCARBAMOL TABLETS 750 MG: 750 TABLET, COATED ORAL at 15:32

## 2020-09-01 RX ADMIN — METHOCARBAMOL TABLETS 750 MG: 750 TABLET, COATED ORAL at 10:06

## 2020-09-01 RX ADMIN — PREGABALIN 300 MG: 150 CAPSULE ORAL at 17:21

## 2020-09-01 RX ADMIN — MICONAZOLE NITRATE: 20 CREAM TOPICAL at 17:18

## 2020-09-01 RX ADMIN — FLUOXETINE 40 MG: 20 CAPSULE ORAL at 06:16

## 2020-09-01 RX ADMIN — TRAZODONE HYDROCHLORIDE 100 MG: 100 TABLET ORAL at 20:46

## 2020-09-01 RX ADMIN — MINERAL OIL, PETROLATUM 1 APPLICATION: 425; 573 OINTMENT OPHTHALMIC at 00:16

## 2020-09-01 RX ADMIN — ACETAZOLAMIDE 1500 MG: 500 CAPSULE, EXTENDED RELEASE ORAL at 06:15

## 2020-09-01 RX ADMIN — OXCARBAZEPINE 150 MG: 150 TABLET, FILM COATED ORAL at 17:17

## 2020-09-01 RX ADMIN — OXYBUTYNIN CHLORIDE 5 MG: 5 TABLET ORAL at 12:46

## 2020-09-01 RX ADMIN — INSULIN GLARGINE 8 UNITS: 100 INJECTION, SOLUTION SUBCUTANEOUS at 17:29

## 2020-09-01 RX ADMIN — SODIUM BICARBONATE 650 MG: 650 TABLET ORAL at 15:32

## 2020-09-01 RX ADMIN — MINERAL OIL, PETROLATUM 1 APPLICATION: 425; 573 OINTMENT OPHTHALMIC at 23:46

## 2020-09-01 RX ADMIN — IVABRADINE 7.5 MG: 7.5 TABLET, FILM COATED ORAL at 10:06

## 2020-09-01 RX ADMIN — OMEPRAZOLE 20 MG: 20 CAPSULE, DELAYED RELEASE ORAL at 06:17

## 2020-09-01 RX ADMIN — TOPIRAMATE 50 MG: 25 TABLET, FILM COATED ORAL at 17:17

## 2020-09-01 RX ADMIN — OXYBUTYNIN CHLORIDE 5 MG: 5 TABLET ORAL at 17:17

## 2020-09-01 RX ADMIN — ACETAZOLAMIDE 1000 MG: 500 CAPSULE, EXTENDED RELEASE ORAL at 17:17

## 2020-09-01 RX ADMIN — ZIPRASIDONE HYDROCHLORIDE 40 MG: 40 CAPSULE ORAL at 06:15

## 2020-09-01 RX ADMIN — MINERAL OIL, PETROLATUM 1 APPLICATION: 425; 573 OINTMENT OPHTHALMIC at 12:47

## 2020-09-01 RX ADMIN — ONDANSETRON 4 MG: 4 TABLET, ORALLY DISINTEGRATING ORAL at 00:43

## 2020-09-01 RX ADMIN — PREGABALIN 300 MG: 150 CAPSULE ORAL at 06:16

## 2020-09-01 RX ADMIN — METHOCARBAMOL TABLETS 750 MG: 750 TABLET, COATED ORAL at 20:46

## 2020-09-01 RX ADMIN — MINERAL OIL, PETROLATUM 1 APPLICATION: 425; 573 OINTMENT OPHTHALMIC at 17:18

## 2020-09-01 RX ADMIN — OXCARBAZEPINE 150 MG: 150 TABLET, FILM COATED ORAL at 06:18

## 2020-09-01 ASSESSMENT — ENCOUNTER SYMPTOMS
HEADACHES: 0
SHORTNESS OF BREATH: 1
FOCAL WEAKNESS: 1
COUGH: 0
WHEEZING: 0
VOMITING: 0
SPUTUM PRODUCTION: 0
HEMOPTYSIS: 0
CONSTIPATION: 0
DIZZINESS: 0
DIARRHEA: 1
ABDOMINAL PAIN: 1
MYALGIAS: 1

## 2020-09-01 ASSESSMENT — PAIN DESCRIPTION - PAIN TYPE: TYPE: CHRONIC PAIN

## 2020-09-01 NOTE — CARE PLAN
Problem: Communication  Goal: The ability to communicate needs accurately and effectively will improve  Outcome: PROGRESSING AS EXPECTED   Patient updated on POC. All patient questions answered at this time.    Problem: Safety  Goal: Will remain free from falls  Outcome: PROGRESSING AS EXPECTED   Fall precautions in place. Bed on the lowest position, non skid socks on, call light within reach, educated on calling for assistance, rounding in place.

## 2020-09-01 NOTE — PROGRESS NOTES
Received report from SONYA Nichols. Assumed care at 1900. Pt is A&Ox4 at this time, having high anxiety, emotional support and reassurance of care provided. Denies pain, patient is having nausea, vomiting and diarrhea, On call hospitalist notified, New orders received for r/o Cdiff, and PO nausea meds. Pt has no IV access, MD aware. Pt given full bed bath this shift, frequent perineal care. Pt repositions self side to side in bed. Assessed and discussed plan of care. Bed in lowest position, call light within reach, educated patient to call for assistance, non skid socks on, rounding in place. Bed alarm is on.

## 2020-09-01 NOTE — DISCHARGE PLANNING
Anticipated Discharge Disposition: Long term placement at SNF    Action: Discussed the patient in IDT rounds. The patient is not able to return home due to grandmother not being able to assist her. Spoke with the grandmother and confirmed that she is not able to take care of her granddaughter, and there is no other family available to assist. SNF placement pending.    Barriers to Discharge: SNF acceptance    Plan: Will follow up with SNFs for acceptance

## 2020-09-01 NOTE — PROGRESS NOTES
Still having loose watery stools, cdiif negative immodium prn ordered and given,  nasal trumpet placed but didn't last.

## 2020-09-01 NOTE — CARE PLAN
Problem: Infection  Goal: Will remain free from infection  Note: Monitor vital signs, and lab values     Problem: Bowel/Gastric:  Goal: Normal bowel function is maintained or improved  Outcome: NOT MET  Note: R/o cdiff, LBM

## 2020-09-02 ENCOUNTER — APPOINTMENT (OUTPATIENT)
Dept: RADIOLOGY | Facility: MEDICAL CENTER | Age: 30
DRG: 123 | End: 2020-09-02
Attending: INTERNAL MEDICINE
Payer: MEDICARE

## 2020-09-02 LAB
ALBUMIN SERPL BCP-MCNC: 3.8 G/DL (ref 3.2–4.9)
AMORPH CRY #/AREA URNS HPF: PRESENT /HPF
APPEARANCE UR: ABNORMAL
BACTERIA #/AREA URNS HPF: NEGATIVE /HPF
BILIRUB UR QL STRIP.AUTO: NEGATIVE
BUN SERPL-MCNC: 14 MG/DL (ref 8–22)
CALCIUM SERPL-MCNC: 8.9 MG/DL (ref 8.5–10.5)
CHLORIDE SERPL-SCNC: 109 MMOL/L (ref 96–112)
CO2 SERPL-SCNC: 16 MMOL/L (ref 20–33)
COLOR UR: YELLOW
CREAT SERPL-MCNC: 0.61 MG/DL (ref 0.5–1.4)
EPI CELLS #/AREA URNS HPF: NEGATIVE /HPF
ERYTHROCYTE [DISTWIDTH] IN BLOOD BY AUTOMATED COUNT: 43.2 FL (ref 35.9–50)
GLUCOSE SERPL-MCNC: 107 MG/DL (ref 65–99)
GLUCOSE UR STRIP.AUTO-MCNC: NEGATIVE MG/DL
HCT VFR BLD AUTO: 39.1 % (ref 37–47)
HGB BLD-MCNC: 12.5 G/DL (ref 12–16)
HYALINE CASTS #/AREA URNS LPF: ABNORMAL /LPF
KETONES UR STRIP.AUTO-MCNC: NEGATIVE MG/DL
LEUKOCYTE ESTERASE UR QL STRIP.AUTO: ABNORMAL
MAGNESIUM SERPL-MCNC: 2 MG/DL (ref 1.5–2.5)
MCH RBC QN AUTO: 29.1 PG (ref 27–33)
MCHC RBC AUTO-ENTMCNC: 32 G/DL (ref 33.6–35)
MCV RBC AUTO: 91.1 FL (ref 81.4–97.8)
MICRO URNS: ABNORMAL
NITRITE UR QL STRIP.AUTO: NEGATIVE
PH UR STRIP.AUTO: 5.5 [PH] (ref 5–8)
PHOSPHATE SERPL-MCNC: 2.9 MG/DL (ref 2.5–4.5)
PLATELET # BLD AUTO: 163 K/UL (ref 164–446)
PMV BLD AUTO: 12.2 FL (ref 9–12.9)
POTASSIUM SERPL-SCNC: 3.4 MMOL/L (ref 3.6–5.5)
PROT UR QL STRIP: 300 MG/DL
RBC # BLD AUTO: 4.29 M/UL (ref 4.2–5.4)
RBC # URNS HPF: ABNORMAL /HPF
RBC UR QL AUTO: ABNORMAL
SODIUM SERPL-SCNC: 140 MMOL/L (ref 135–145)
SP GR UR STRIP.AUTO: 1.02
UROBILINOGEN UR STRIP.AUTO-MCNC: 0.2 MG/DL
WBC # BLD AUTO: 5.5 K/UL (ref 4.8–10.8)
WBC #/AREA URNS HPF: ABNORMAL /HPF
YEAST BUDDING URNS QL: PRESENT /HPF

## 2020-09-02 PROCEDURE — 700105 HCHG RX REV CODE 258: Performed by: INTERNAL MEDICINE

## 2020-09-02 PROCEDURE — 700102 HCHG RX REV CODE 250 W/ 637 OVERRIDE(OP): Performed by: HOSPITALIST

## 2020-09-02 PROCEDURE — 36415 COLL VENOUS BLD VENIPUNCTURE: CPT

## 2020-09-02 PROCEDURE — A9270 NON-COVERED ITEM OR SERVICE: HCPCS | Performed by: HOSPITALIST

## 2020-09-02 PROCEDURE — 700111 HCHG RX REV CODE 636 W/ 250 OVERRIDE (IP): Performed by: INTERNAL MEDICINE

## 2020-09-02 PROCEDURE — 700102 HCHG RX REV CODE 250 W/ 637 OVERRIDE(OP): Performed by: NURSE PRACTITIONER

## 2020-09-02 PROCEDURE — 700102 HCHG RX REV CODE 250 W/ 637 OVERRIDE(OP): Performed by: INTERNAL MEDICINE

## 2020-09-02 PROCEDURE — 94640 AIRWAY INHALATION TREATMENT: CPT

## 2020-09-02 PROCEDURE — 94760 N-INVAS EAR/PLS OXIMETRY 1: CPT

## 2020-09-02 PROCEDURE — 83735 ASSAY OF MAGNESIUM: CPT

## 2020-09-02 PROCEDURE — 99232 SBSQ HOSP IP/OBS MODERATE 35: CPT | Performed by: INTERNAL MEDICINE

## 2020-09-02 PROCEDURE — 71045 X-RAY EXAM CHEST 1 VIEW: CPT

## 2020-09-02 PROCEDURE — 770021 HCHG ROOM/CARE - ISO PRIVATE

## 2020-09-02 PROCEDURE — A9270 NON-COVERED ITEM OR SERVICE: HCPCS | Performed by: INTERNAL MEDICINE

## 2020-09-02 PROCEDURE — 85027 COMPLETE CBC AUTOMATED: CPT

## 2020-09-02 PROCEDURE — 700101 HCHG RX REV CODE 250: Performed by: HOSPITALIST

## 2020-09-02 PROCEDURE — 80069 RENAL FUNCTION PANEL: CPT

## 2020-09-02 PROCEDURE — A9270 NON-COVERED ITEM OR SERVICE: HCPCS | Performed by: NURSE PRACTITIONER

## 2020-09-02 PROCEDURE — 81001 URINALYSIS AUTO W/SCOPE: CPT

## 2020-09-02 PROCEDURE — 700111 HCHG RX REV CODE 636 W/ 250 OVERRIDE (IP): Performed by: HOSPITALIST

## 2020-09-02 RX ORDER — POTASSIUM CHLORIDE 20 MEQ/1
40 TABLET, EXTENDED RELEASE ORAL ONCE
Status: COMPLETED | OUTPATIENT
Start: 2020-09-02 | End: 2020-09-02

## 2020-09-02 RX ORDER — LORAZEPAM 2 MG/ML
1 INJECTION INTRAMUSCULAR ONCE
Status: COMPLETED | OUTPATIENT
Start: 2020-09-02 | End: 2020-09-02

## 2020-09-02 RX ORDER — LORAZEPAM 2 MG/ML
0.5 INJECTION INTRAMUSCULAR EVERY 4 HOURS PRN
Status: DISCONTINUED | OUTPATIENT
Start: 2020-09-02 | End: 2020-09-14 | Stop reason: HOSPADM

## 2020-09-02 RX ORDER — SODIUM CHLORIDE 9 MG/ML
INJECTION, SOLUTION INTRAVENOUS ONCE
Status: COMPLETED | OUTPATIENT
Start: 2020-09-02 | End: 2020-09-02

## 2020-09-02 RX ORDER — MORPHINE SULFATE 4 MG/ML
4 INJECTION, SOLUTION INTRAMUSCULAR; INTRAVENOUS ONCE
Status: COMPLETED | OUTPATIENT
Start: 2020-09-02 | End: 2020-09-02

## 2020-09-02 RX ADMIN — MICONAZOLE NITRATE: 20 CREAM TOPICAL at 05:36

## 2020-09-02 RX ADMIN — PREGABALIN 300 MG: 150 CAPSULE ORAL at 18:12

## 2020-09-02 RX ADMIN — OMEPRAZOLE 20 MG: 20 CAPSULE, DELAYED RELEASE ORAL at 05:37

## 2020-09-02 RX ADMIN — ASPIRIN 81 MG: 81 TABLET, COATED ORAL at 05:37

## 2020-09-02 RX ADMIN — PROCHLORPERAZINE MALEATE 10 MG: 10 TABLET ORAL at 14:07

## 2020-09-02 RX ADMIN — MINERAL OIL, PETROLATUM 1 APPLICATION: 425; 573 OINTMENT OPHTHALMIC at 11:38

## 2020-09-02 RX ADMIN — TOPIRAMATE 50 MG: 25 TABLET, FILM COATED ORAL at 05:37

## 2020-09-02 RX ADMIN — ZIPRASIDONE HYDROCHLORIDE 40 MG: 40 CAPSULE ORAL at 05:37

## 2020-09-02 RX ADMIN — OXYBUTYNIN CHLORIDE 5 MG: 5 TABLET ORAL at 18:18

## 2020-09-02 RX ADMIN — METHOCARBAMOL TABLETS 750 MG: 750 TABLET, COATED ORAL at 08:48

## 2020-09-02 RX ADMIN — OXYBUTYNIN CHLORIDE 5 MG: 5 TABLET ORAL at 12:01

## 2020-09-02 RX ADMIN — SODIUM BICARBONATE 650 MG: 650 TABLET ORAL at 03:33

## 2020-09-02 RX ADMIN — GABAPENTIN 300 MG: 300 CAPSULE ORAL at 18:20

## 2020-09-02 RX ADMIN — MINERAL OIL, PETROLATUM 1 APPLICATION: 425; 573 OINTMENT OPHTHALMIC at 18:27

## 2020-09-02 RX ADMIN — TRAZODONE HYDROCHLORIDE 100 MG: 100 TABLET ORAL at 20:20

## 2020-09-02 RX ADMIN — ACETAZOLAMIDE 1000 MG: 500 CAPSULE, EXTENDED RELEASE ORAL at 18:26

## 2020-09-02 RX ADMIN — BUSPIRONE HYDROCHLORIDE 10 MG: 10 TABLET ORAL at 20:20

## 2020-09-02 RX ADMIN — SODIUM BICARBONATE 650 MG: 650 TABLET ORAL at 21:37

## 2020-09-02 RX ADMIN — LEVOTHYROXINE SODIUM 75 MCG: 0.07 TABLET ORAL at 05:37

## 2020-09-02 RX ADMIN — MYCOPHENOLATE MOFETIL 1000 MG: 250 CAPSULE ORAL at 05:37

## 2020-09-02 RX ADMIN — GABAPENTIN 300 MG: 300 CAPSULE ORAL at 11:36

## 2020-09-02 RX ADMIN — ZIPRASIDONE HYDROCHLORIDE 40 MG: 40 CAPSULE ORAL at 18:27

## 2020-09-02 RX ADMIN — OXYCODONE HYDROCHLORIDE 10 MG: 5 TABLET ORAL at 08:48

## 2020-09-02 RX ADMIN — MYCOPHENOLATE MOFETIL 1000 MG: 250 CAPSULE ORAL at 18:19

## 2020-09-02 RX ADMIN — METHOCARBAMOL TABLETS 750 MG: 750 TABLET, COATED ORAL at 18:12

## 2020-09-02 RX ADMIN — MINERAL OIL, PETROLATUM 1 APPLICATION: 425; 573 OINTMENT OPHTHALMIC at 23:59

## 2020-09-02 RX ADMIN — SODIUM BICARBONATE 650 MG: 650 TABLET ORAL at 08:49

## 2020-09-02 RX ADMIN — TOPIRAMATE 50 MG: 25 TABLET, FILM COATED ORAL at 18:13

## 2020-09-02 RX ADMIN — MORPHINE SULFATE 4 MG: 4 INJECTION INTRAVENOUS at 11:37

## 2020-09-02 RX ADMIN — SODIUM CHLORIDE: 9 INJECTION, SOLUTION INTRAVENOUS at 11:51

## 2020-09-02 RX ADMIN — IVABRADINE 7.5 MG: 7.5 TABLET, FILM COATED ORAL at 18:25

## 2020-09-02 RX ADMIN — ALBUTEROL SULFATE 2 PUFF: 90 AEROSOL, METERED RESPIRATORY (INHALATION) at 15:07

## 2020-09-02 RX ADMIN — OXYCODONE HYDROCHLORIDE 10 MG: 5 TABLET ORAL at 03:33

## 2020-09-02 RX ADMIN — PREGABALIN 300 MG: 150 CAPSULE ORAL at 05:37

## 2020-09-02 RX ADMIN — OXYBUTYNIN CHLORIDE 5 MG: 5 TABLET ORAL at 05:37

## 2020-09-02 RX ADMIN — IPRATROPIUM BROMIDE AND ALBUTEROL SULFATE 3 ML: .5; 3 SOLUTION RESPIRATORY (INHALATION) at 15:30

## 2020-09-02 RX ADMIN — METHOCARBAMOL TABLETS 750 MG: 750 TABLET, COATED ORAL at 21:37

## 2020-09-02 RX ADMIN — GABAPENTIN 300 MG: 300 CAPSULE ORAL at 05:37

## 2020-09-02 RX ADMIN — BUSPIRONE HYDROCHLORIDE 10 MG: 10 TABLET ORAL at 08:49

## 2020-09-02 RX ADMIN — LORAZEPAM 1 MG: 2 INJECTION INTRAMUSCULAR; INTRAVENOUS at 18:08

## 2020-09-02 RX ADMIN — METHOCARBAMOL TABLETS 750 MG: 750 TABLET, COATED ORAL at 12:01

## 2020-09-02 RX ADMIN — SODIUM BICARBONATE 650 MG: 650 TABLET ORAL at 16:21

## 2020-09-02 RX ADMIN — OXCARBAZEPINE 150 MG: 150 TABLET, FILM COATED ORAL at 18:19

## 2020-09-02 RX ADMIN — INSULIN GLARGINE 8 UNITS: 100 INJECTION, SOLUTION SUBCUTANEOUS at 18:29

## 2020-09-02 RX ADMIN — DOCUSATE SODIUM 100 MG: 100 CAPSULE, LIQUID FILLED ORAL at 18:13

## 2020-09-02 RX ADMIN — MINERAL OIL, PETROLATUM 1 APPLICATION: 425; 573 OINTMENT OPHTHALMIC at 05:36

## 2020-09-02 RX ADMIN — MICONAZOLE NITRATE: 20 CREAM TOPICAL at 18:27

## 2020-09-02 RX ADMIN — OXCARBAZEPINE 150 MG: 150 TABLET, FILM COATED ORAL at 05:37

## 2020-09-02 RX ADMIN — IVABRADINE 7.5 MG: 7.5 TABLET, FILM COATED ORAL at 08:48

## 2020-09-02 RX ADMIN — FLUOXETINE 40 MG: 20 CAPSULE ORAL at 05:38

## 2020-09-02 RX ADMIN — PREDNISONE 10 MG: 10 TABLET ORAL at 05:37

## 2020-09-02 RX ADMIN — POTASSIUM CHLORIDE 40 MEQ: 1500 TABLET, EXTENDED RELEASE ORAL at 10:15

## 2020-09-02 RX ADMIN — ACETAZOLAMIDE 1500 MG: 500 CAPSULE, EXTENDED RELEASE ORAL at 05:36

## 2020-09-02 RX ADMIN — DOCUSATE SODIUM 50 MG AND SENNOSIDES 8.6 MG 2 TABLET: 8.6; 5 TABLET, FILM COATED ORAL at 18:12

## 2020-09-02 ASSESSMENT — ENCOUNTER SYMPTOMS
DIARRHEA: 1
SHORTNESS OF BREATH: 1
ABDOMINAL PAIN: 1
COUGH: 0
WHEEZING: 0
DIZZINESS: 0
FOCAL WEAKNESS: 1
CONSTIPATION: 0
SPUTUM PRODUCTION: 0
MYALGIAS: 1
HEMOPTYSIS: 0
HEADACHES: 0
VOMITING: 0

## 2020-09-02 ASSESSMENT — PAIN DESCRIPTION - PAIN TYPE
TYPE: ACUTE PAIN
TYPE: CHRONIC PAIN

## 2020-09-02 NOTE — PROGRESS NOTES
Hospital Medicine Daily Progress Note    Date of Service  9/2/2020     Chief Complaint  29 y.o. female admitted 8/15/2020 with blurred vision.    Hospital Course    PMH transverse myelitis, asthma, chronic relapsing inflammatory optic neuropathy, suprapubic cath, DM who presented with HA and blurred vision. CTA head and neck and TTE were unremarkable. Dr. Yousif was consulted, recommended conservative medication management for her transient monocular visual loss. She also was found with UTI ESBL klebsiella. She is unable to return home, grandmother unable to care for her. Case management has been closely involved.       Interval Problem Update  Diarrhea improved with imodium. Patient reports chills starting yesterday. Has increased pain in suprapubic region and continues to have burning with urination, will repeat UA. No hematuria noted. Patient reports pus from around suprapubic catheter though none seen on my evaluation. Also reports significant pain in b/l ankles.     Consultants/Specialty  Ophtho Dr. Soliz  Neuro-ophtho Dr. Yousif    Code Status  Full Code    Disposition  grandmother unable to care for her  Longterm SNF pending    Review of Systems  Review of Systems   Constitutional: Positive for malaise/fatigue.   Respiratory: Positive for shortness of breath. Negative for cough, hemoptysis, sputum production and wheezing.    Cardiovascular: Negative for chest pain and leg swelling.   Gastrointestinal: Positive for abdominal pain (around cath site) and diarrhea. Negative for constipation and vomiting.   Genitourinary: Negative for dysuria.   Musculoskeletal: Positive for myalgias.   Skin: Negative for rash.   Neurological: Positive for focal weakness (chronic). Negative for dizziness and headaches.        Physical Exam  Temp:  [36 °C (96.8 °F)-36.5 °C (97.7 °F)] 36.5 °C (97.7 °F)  Pulse:  [60-72] 63  Resp:  [18-20] 18  BP: ()/(51-67) 104/61  SpO2:  [90 %-94 %] 92 %    Physical Exam  Constitutional:        Appearance: She is obese. She is not toxic-appearing or diaphoretic.      Comments: Appears fatigued   HENT:      Mouth/Throat:      Mouth: Mucous membranes are moist.   Eyes:      General:         Right eye: No discharge.         Left eye: No discharge.   Neck:      Musculoskeletal: Neck supple.   Cardiovascular:      Rate and Rhythm: Normal rate and regular rhythm.      Pulses: Normal pulses.   Pulmonary:      Breath sounds: No stridor. No wheezing, rhonchi or rales.      Comments: Diminished througout  Chest:      Chest wall: No tenderness.   Abdominal:      Tenderness: There is abdominal tenderness (suprapubic near cath site). There is no guarding or rebound.      Comments: suprapubic cath in place, no drainage seen, mildly erythematous around tube   Musculoskeletal:         General: Tenderness (b/l feet and ankles ) present.      Comments: Nonpitting edema to legs   Skin:     General: Skin is warm and dry.   Neurological:      Mental Status: She is alert and oriented to person, place, and time.   Psychiatric:         Mood and Affect: Mood is anxious.         Fluids    Intake/Output Summary (Last 24 hours) at 9/2/2020 1522  Last data filed at 9/2/2020 1240  Gross per 24 hour   Intake 360 ml   Output 1650 ml   Net -1290 ml       Laboratory  Recent Labs     08/31/20  0048 09/02/20  0047   WBC 6.7 5.5   RBC 4.07* 4.29   HEMOGLOBIN 11.9* 12.5   HEMATOCRIT 37.2 39.1   MCV 91.4 91.1   MCH 29.2 29.1   MCHC 32.0* 32.0*   RDW 43.5 43.2   PLATELETCT 141* 163*   MPV 12.0 12.2     Recent Labs     08/31/20  0048 09/02/20  0047   SODIUM 141 140   POTASSIUM 3.6 3.4*   CHLORIDE 110 109   CO2 17* 16*   GLUCOSE 96 107*   BUN 12 14   CREATININE 0.62 0.61   CALCIUM 8.9 8.9                   Imaging  IR-US GUIDED PIV   Final Result    Ultrasound-guided PERIPHERAL IV INSERTION performed by    qualified nursing staff as above.      CT-CTA CHEST PULMONARY ARTERY W/ RECONS   Final Result      No CT evidence of acute pulmonary  thromboembolism      Mild splenomegaly      New small pleural effusions            US-BLADDER   Final Result         1.  Right ureteral jet is not definitively identified, otherwise unremarkable exam.      DX-SHOULDER 2+ LEFT   Final Result      1.  Normal left shoulder series.      DX-CHEST-PORTABLE (1 VIEW)   Final Result         1.  No acute cardiopulmonary disease.      US-BLADDER   Final Result      Suprapubic catheter balloon is inflated, presumably within the collapsed bladder. If more definitive analysis is required, consider clamping the bladder catheter for  hours prior to repeat ultrasound, allowing the bladder to fill with fluid and therefore    better see the bladder walls      CT-HEAD W/O   Final Result         1.  No acute intracranial abnormality.      DX-WRIST-COMPLETE 3+ RIGHT   Final Result      No acute fracture.  If pain persists, recommend repeat imaging in 7 to 10 days.      EC-ECHOCARDIOGRAM COMPLETE W/ CONT   Final Result      CT-CTA NECK WITH & W/O-POST PROCESSING   Final Result      No high-grade stenosis, large vessel occlusion, aneurysm or dissection.      CT-CTA HEAD WITH & W/O-POST PROCESS   Final Result      No intracranial aneurysm, focal stenosis, or abrupt large vessel cut off.      US-CAROTID DOPPLER BILAT   Final Result      CT-HEAD W/O   Final Result      No CT evidence of acute infarct, hemorrhage or mass.           Assessment/Plan  * Urinary tract infection associated with catheterization of urinary tract (HCC)- (present on admission)  Assessment & Plan  ESBL klebsiella--> IV meropenem x 7 days completed  Will repeat UA     Diarrhea  Assessment & Plan  c diff negative.   Vitals stable  Start imodium PRN   Replace electrolytes as needed   Improved     Optic neuritis- (present on admission)  Assessment & Plan  Continue outpatient prednisone   ophthalmology consulted, Dr. Aparicio.. signed off  continue home Cellcept   cta head and neck--WNL  Echo.  Within normal limits  hypercoag  w./u.. Unremarkable except for a mild elevation of homocystine            Diabetes mellitus type 2 in obese ([facility])- (present on admission)  Assessment & Plan  Lantus decreased to 8U qhs  Follow-up A1c is 5  Glucose well controlled  ISS    Morbidly obese (HCC)- (present on admission)  Assessment & Plan  BMI 42    QT prolongation  Assessment & Plan  Normalized on repeat EKG  Maintain normal lytes    Urinary tract infection due to ESBL Klebsiella- (present on admission)  Assessment & Plan  Received 7 days of meropenem  8/30 -  Complains of urethra burning. Will trial pyridium and recheck UA  8/31 - UA neg for WBC and bacteria    Wrist injury, right, initial encounter  Assessment & Plan  Xray negative for fracture    Pain control    Dry eyes- (present on admission)  Assessment & Plan  Eye lubrication     Pain in both feet- (present on admission)  Assessment & Plan  Pain control with po oxycodone        Idiopathic intracranial hypertension- (present on admission)  Assessment & Plan  History of  She is followed by Dr. Yousif, neuro-ophthalmology   continue home Diamox  Supportive care as suggested by neuro-ophthalmology    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[facility])- (present on admission)  Assessment & Plan  Stable at baseline nonambulatory state,    Blurred vision  Assessment & Plan  Presumed retinopathy  monitor    Mild intermittent asthma without complication- (present on admission)  Assessment & Plan  Intermittent flares, affected by smoke  RT protocol, albuterol PRN  Small effusions on CTA chest      Chest pain  Assessment & Plan  CTA chest neg for PE, small effusions  With tender right shoulder - XR unremarkable  CXR was normal  EKG showed prolonged QT. H/o SVT, pAfib. Telemetry has been unremarkable  No changes with eating, no change with GI cocktail. Added daily omeprazole  She denies having chest pain before. Chart review showed multiple ED visits for chest pains. She takes robaxin several times a day, this is restarted  Over  all improving      Presence of suprapubic catheter (HCC)- (present on admission)  Assessment & Plan  Was placed 8/6/2020  Intermittent hematuria. Bladder scans have been umremarkable. Hematuria improved after stopping lovenox dvt ppx.  8/26 - I spoke with Dr. Galicia, would wait 1 month to exchange cath, ok to change with IR after 1 more week, recommends upsizing      History of atrial fibrillation and cardiomyopathy?- (present on admission)  Assessment & Plan  No events on telemetry, will stop    Schizophrenia (HCC)- (present on admission)  Assessment & Plan  Reported history of   Continue Geodon and Prozac    Peripheral neuropathy and chornic pain syndrome (CMS-HCC)- (present on admission)  Assessment & Plan  At risk of morbid obesity hypoventilation, avoid excessive medications limiting respiratory drive.       VTE prophylaxis: scds, hematuria

## 2020-09-02 NOTE — PROGRESS NOTES
Hospital Medicine Daily Progress Note    Date of Service  9/1/2020     Chief Complaint  29 y.o. female admitted 8/15/2020 with blurred vision.    Hospital Course    PMH transverse myelitis, asthma, chronic relapsing inflammatory optic neuropathy, suprapubic cath, DM who presented with HA and blurred vision. CTA head and neck and TTE were unremarkable. Dr. Yousif was consulted, recommended conservative medication management for her transient monocular visual loss. She also was found with UTI ESBL klebsiella. She is unable to return home, grandmother unable to care for her. Case management has been closely involved.       Interval Problem Update  Patient having diarrhea overnight, C. difficile negative will start Imodium.  Vital signs stable.     Consultants/Specialty  Ophtho Dr. Soliz  Neuro-ophtho Dr. Yousif    Code Status  Full Code    Disposition  grandmother unable to care for her  Longterm SNF pending    Review of Systems  Review of Systems   Constitutional: Positive for malaise/fatigue.   Respiratory: Positive for shortness of breath. Negative for cough, hemoptysis, sputum production and wheezing.    Cardiovascular: Negative for chest pain and leg swelling.   Gastrointestinal: Positive for abdominal pain (around cath site) and diarrhea. Negative for constipation and vomiting.   Genitourinary: Negative for dysuria.   Musculoskeletal: Positive for myalgias.   Skin: Negative for rash.   Neurological: Positive for focal weakness (chronic). Negative for dizziness and headaches.        Physical Exam  Temp:  [36.2 °C (97.1 °F)-36.7 °C (98 °F)] 36.4 °C (97.6 °F)  Pulse:  [63-82] 63  Resp:  [17-18] 18  BP: ()/(52-68) 94/54  SpO2:  [90 %-96 %] 91 %    Physical Exam  Constitutional:       Appearance: She is obese. She is not toxic-appearing or diaphoretic.      Comments: Appears fatigued   HENT:      Mouth/Throat:      Mouth: Mucous membranes are moist.   Eyes:      General:         Right eye: No discharge.          Left eye: No discharge.   Neck:      Musculoskeletal: Neck supple.   Cardiovascular:      Rate and Rhythm: Normal rate and regular rhythm.      Pulses: Normal pulses.   Pulmonary:      Breath sounds: No stridor. No wheezing, rhonchi or rales.      Comments: Diminished througout  Chest:      Chest wall: No tenderness.   Abdominal:      Tenderness: There is abdominal tenderness (suprapubic near cath site). There is no guarding or rebound.      Comments: suprapubic cath in place, no drainage seen, mildly erythematous around tube   Musculoskeletal:      Comments: Nonpitting edema to legs   Skin:     General: Skin is warm and dry.   Neurological:      Mental Status: She is alert and oriented to person, place, and time.   Psychiatric:         Mood and Affect: Mood is anxious.         Fluids    Intake/Output Summary (Last 24 hours) at 9/1/2020 1718  Last data filed at 9/1/2020 1651  Gross per 24 hour   Intake 952 ml   Output 1600 ml   Net -648 ml       Laboratory  Recent Labs     08/30/20  0042 08/31/20  0048   WBC 6.2 6.7   RBC 4.07* 4.07*   HEMOGLOBIN 12.3 11.9*   HEMATOCRIT 37.6 37.2   MCV 92.4 91.4   MCH 30.2 29.2   MCHC 32.7* 32.0*   RDW 44.9 43.5   PLATELETCT 143* 141*   MPV 12.0 12.0     Recent Labs     08/30/20  0042 08/31/20  0048   SODIUM 140 141   POTASSIUM 3.5* 3.6   CHLORIDE 110 110   CO2 17* 17*   GLUCOSE 90 96   BUN 13 12   CREATININE 0.69 0.62   CALCIUM 8.8 8.9                   Imaging  CT-CTA CHEST PULMONARY ARTERY W/ RECONS   Final Result      No CT evidence of acute pulmonary thromboembolism      Mild splenomegaly      New small pleural effusions            US-BLADDER   Final Result         1.  Right ureteral jet is not definitively identified, otherwise unremarkable exam.      DX-SHOULDER 2+ LEFT   Final Result      1.  Normal left shoulder series.      DX-CHEST-PORTABLE (1 VIEW)   Final Result         1.  No acute cardiopulmonary disease.      US-BLADDER   Final Result      Suprapubic catheter  balloon is inflated, presumably within the collapsed bladder. If more definitive analysis is required, consider clamping the bladder catheter for  hours prior to repeat ultrasound, allowing the bladder to fill with fluid and therefore    better see the bladder walls      CT-HEAD W/O   Final Result         1.  No acute intracranial abnormality.      DX-WRIST-COMPLETE 3+ RIGHT   Final Result      No acute fracture.  If pain persists, recommend repeat imaging in 7 to 10 days.      EC-ECHOCARDIOGRAM COMPLETE W/ CONT   Final Result      CT-CTA NECK WITH & W/O-POST PROCESSING   Final Result      No high-grade stenosis, large vessel occlusion, aneurysm or dissection.      CT-CTA HEAD WITH & W/O-POST PROCESS   Final Result      No intracranial aneurysm, focal stenosis, or abrupt large vessel cut off.      US-CAROTID DOPPLER BILAT   Final Result      CT-HEAD W/O   Final Result      No CT evidence of acute infarct, hemorrhage or mass.           Assessment/Plan  * Urinary tract infection associated with catheterization of urinary tract (HCC)- (present on admission)  Assessment & Plan  ESBL klebsiella--> IV meropenem x 7 days completed    Diarrhea  Assessment & Plan  c diff negative.   Vitals stable  Start imodium PRN   Replace electrolytes as needed     Optic neuritis- (present on admission)  Assessment & Plan  Continue outpatient prednisone   ophthalmology consulted, Dr. Aparicio.. signed off  continue home Cellcept   cta head and neck--WNL  Echo.  Within normal limits  hypercoag w./u.. Unremarkable except for a mild elevation of homocystine            Diabetes mellitus type 2 in obese (HCC)- (present on admission)  Assessment & Plan  Lantus decreased to 8U qhs  Follow-up A1c is 5  Glucose well controlled  ISS    Morbidly obese (HCC)- (present on admission)  Assessment & Plan  BMI 42    QT prolongation  Assessment & Plan  Normalized on repeat EKG  Maintain normal lytes    Urinary tract infection due to ESBL Klebsiella- (present  on admission)  Assessment & Plan  Received 7 days of meropenem  8/30 -  Complains of urethra burning. Will trial pyridium and recheck UA  8/31 - UA neg for WBC and bacteria    Wrist injury, right, initial encounter  Assessment & Plan  Xray negative for fracture    Pain control    Dry eyes- (present on admission)  Assessment & Plan  Eye lubrication     Pain in both feet- (present on admission)  Assessment & Plan  Pain control with po oxycodone        Idiopathic intracranial hypertension- (present on admission)  Assessment & Plan  History of  She is followed by Dr. Yousif, neuro-ophthalmology   continue home Diamox  Supportive care as suggested by neuro-ophthalmology    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HCC)- (present on admission)  Assessment & Plan  Stable at baseline nonambulatory state,    Blurred vision  Assessment & Plan  Presumed retinopathy  monitor    Mild intermittent asthma without complication- (present on admission)  Assessment & Plan  Intermittent flares, affected by smoke  RT protocol, albuterol PRN  Small effusions on CTA chest      Chest pain  Assessment & Plan  CTA chest neg for PE, small effusions  With tender right shoulder - XR unremarkable  CXR was normal  EKG showed prolonged QT. H/o SVT, pAfib. Telemetry has been unremarkable  No changes with eating, no change with GI cocktail. Added daily omeprazole  She denies having chest pain before. Chart review showed multiple ED visits for chest pains. She takes robaxin several times a day, this is restarted  Over all improving      Presence of suprapubic catheter (HCC)- (present on admission)  Assessment & Plan  Was placed 8/6/2020  Infected  Intermittent hematuria. Bladder scans have been umremarkable. Hematuria improved after stopping lovenox dvt ppx.  8/26 - I spoke with Dr. Galicia, would wait 1 month to exchange cath, ok to change with IR after 1 more week, recommends upsizing      History of atrial fibrillation and cardiomyopathy?- (present on  admission)  Assessment & Plan  No events on telemetry, will stop    Schizophrenia (HCC)- (present on admission)  Assessment & Plan  Reported history of   Continue Geodon and Prozac    Peripheral neuropathy and chornic pain syndrome (CMS-HCC)- (present on admission)  Assessment & Plan  At risk of morbid obesity hypoventilation, avoid excessive medications limiting respiratory drive.       VTE prophylaxis: scds, hematuria

## 2020-09-02 NOTE — PROGRESS NOTES
Patient is alert and oriented. Pain medication given as needed. Patient was wheezing earlier albuterol inhaler given. Respiratory called they and was able to do a breathing treatment. Reviewed POC with patient. Contact isolation precautions in place. Reviewed POC with patient call light within reach hourly rounding in practice.

## 2020-09-02 NOTE — PROGRESS NOTES
Received report & assumed care of patient. Pt has no IV access, physician is aware. Pt resting in bed, no complaints at this time. Call light in reach, bed in low and locked position.

## 2020-09-02 NOTE — CARE PLAN
Problem: Communication  Goal: The ability to communicate needs accurately and effectively will improve  Outcome: PROGRESSING AS EXPECTED     Problem: Safety  Goal: Will remain free from injury  Outcome: PROGRESSING AS EXPECTED     Problem: Infection  Goal: Will remain free from infection  Outcome: PROGRESSING AS EXPECTED     Problem: Infection  Goal: Will remain free from infection  Outcome: PROGRESSING AS EXPECTED     Problem: Bowel/Gastric:  Goal: Will not experience complications related to bowel motility  Outcome: PROGRESSING SLOWER THAN EXPECTED     Problem: Knowledge Deficit  Goal: Knowledge of disease process/condition, treatment plan, diagnostic tests, and medications will improve  Outcome: PROGRESSING SLOWER THAN EXPECTED     Problem: Respiratory:  Goal: Respiratory status will improve  Outcome: PROGRESSING AS EXPECTED     Problem: Pain Management  Goal: Pain level will decrease to patient's comfort goal  Outcome: PROGRESSING AS EXPECTED

## 2020-09-03 ENCOUNTER — APPOINTMENT (OUTPATIENT)
Dept: RADIOLOGY | Facility: MEDICAL CENTER | Age: 30
DRG: 123 | End: 2020-09-03
Attending: INTERNAL MEDICINE
Payer: MEDICARE

## 2020-09-03 PROBLEM — R94.31 QT PROLONGATION: Status: RESOLVED | Noted: 2020-08-27 | Resolved: 2020-09-03

## 2020-09-03 PROBLEM — R19.7 DIARRHEA: Status: RESOLVED | Noted: 2020-09-01 | Resolved: 2020-09-03

## 2020-09-03 PROBLEM — B96.89 URINARY TRACT INFECTION DUE TO ESBL KLEBSIELLA: Status: RESOLVED | Noted: 2020-08-22 | Resolved: 2020-09-03

## 2020-09-03 PROBLEM — N39.0 URINARY TRACT INFECTION DUE TO ESBL KLEBSIELLA: Status: RESOLVED | Noted: 2020-08-22 | Resolved: 2020-09-03

## 2020-09-03 PROBLEM — R07.9 CHEST PAIN: Status: RESOLVED | Noted: 2017-03-30 | Resolved: 2020-09-03

## 2020-09-03 LAB
ALBUMIN SERPL BCP-MCNC: 3.8 G/DL (ref 3.2–4.9)
ANION GAP SERPL CALC-SCNC: 11 MMOL/L (ref 7–16)
BUN SERPL-MCNC: 11 MG/DL (ref 8–22)
BUN SERPL-MCNC: 11 MG/DL (ref 8–22)
CALCIUM SERPL-MCNC: 8.6 MG/DL (ref 8.5–10.5)
CALCIUM SERPL-MCNC: 8.9 MG/DL (ref 8.5–10.5)
CHLORIDE SERPL-SCNC: 113 MMOL/L (ref 96–112)
CHLORIDE SERPL-SCNC: 115 MMOL/L (ref 96–112)
CO2 SERPL-SCNC: 15 MMOL/L (ref 20–33)
CO2 SERPL-SCNC: 17 MMOL/L (ref 20–33)
CREAT SERPL-MCNC: 0.65 MG/DL (ref 0.5–1.4)
CREAT SERPL-MCNC: 0.67 MG/DL (ref 0.5–1.4)
GLUCOSE BLD-MCNC: 88 MG/DL (ref 65–99)
GLUCOSE SERPL-MCNC: 88 MG/DL (ref 65–99)
GLUCOSE SERPL-MCNC: 96 MG/DL (ref 65–99)
PHOSPHATE SERPL-MCNC: 2.7 MG/DL (ref 2.5–4.5)
POTASSIUM SERPL-SCNC: 3.2 MMOL/L (ref 3.6–5.5)
POTASSIUM SERPL-SCNC: 3.9 MMOL/L (ref 3.6–5.5)
SODIUM SERPL-SCNC: 141 MMOL/L (ref 135–145)
SODIUM SERPL-SCNC: 143 MMOL/L (ref 135–145)

## 2020-09-03 PROCEDURE — 700102 HCHG RX REV CODE 250 W/ 637 OVERRIDE(OP): Performed by: NURSE PRACTITIONER

## 2020-09-03 PROCEDURE — A9270 NON-COVERED ITEM OR SERVICE: HCPCS | Performed by: HOSPITALIST

## 2020-09-03 PROCEDURE — A9270 NON-COVERED ITEM OR SERVICE: HCPCS | Performed by: INTERNAL MEDICINE

## 2020-09-03 PROCEDURE — 700111 HCHG RX REV CODE 636 W/ 250 OVERRIDE (IP): Performed by: INTERNAL MEDICINE

## 2020-09-03 PROCEDURE — BT101ZZ FLUOROSCOPY OF BLADDER USING LOW OSMOLAR CONTRAST: ICD-10-PCS | Performed by: RADIOLOGY

## 2020-09-03 PROCEDURE — 700102 HCHG RX REV CODE 250 W/ 637 OVERRIDE(OP): Performed by: HOSPITALIST

## 2020-09-03 PROCEDURE — A9270 NON-COVERED ITEM OR SERVICE: HCPCS | Performed by: NURSE PRACTITIONER

## 2020-09-03 PROCEDURE — 770020 HCHG ROOM/CARE - TELE (206)

## 2020-09-03 PROCEDURE — 700102 HCHG RX REV CODE 250 W/ 637 OVERRIDE(OP): Performed by: INTERNAL MEDICINE

## 2020-09-03 PROCEDURE — 0T2BX0Z CHANGE DRAINAGE DEVICE IN BLADDER, EXTERNAL APPROACH: ICD-10-PCS | Performed by: RADIOLOGY

## 2020-09-03 PROCEDURE — 70450 CT HEAD/BRAIN W/O DYE: CPT

## 2020-09-03 PROCEDURE — 36415 COLL VENOUS BLD VENIPUNCTURE: CPT

## 2020-09-03 PROCEDURE — 80069 RENAL FUNCTION PANEL: CPT

## 2020-09-03 PROCEDURE — 99232 SBSQ HOSP IP/OBS MODERATE 35: CPT | Performed by: INTERNAL MEDICINE

## 2020-09-03 PROCEDURE — 75984 XRAY CONTROL CATHETER CHANGE: CPT

## 2020-09-03 PROCEDURE — 770021 HCHG ROOM/CARE - ISO PRIVATE

## 2020-09-03 PROCEDURE — 80048 BASIC METABOLIC PNL TOTAL CA: CPT

## 2020-09-03 PROCEDURE — 700111 HCHG RX REV CODE 636 W/ 250 OVERRIDE (IP)

## 2020-09-03 PROCEDURE — 700105 HCHG RX REV CODE 258: Performed by: INTERNAL MEDICINE

## 2020-09-03 PROCEDURE — 82962 GLUCOSE BLOOD TEST: CPT

## 2020-09-03 PROCEDURE — 700117 HCHG RX CONTRAST REV CODE 255: Performed by: INTERNAL MEDICINE

## 2020-09-03 PROCEDURE — 700111 HCHG RX REV CODE 636 W/ 250 OVERRIDE (IP): Performed by: HOSPITALIST

## 2020-09-03 RX ORDER — LORAZEPAM 2 MG/ML
2 INJECTION INTRAMUSCULAR
Status: DISCONTINUED | OUTPATIENT
Start: 2020-09-03 | End: 2020-09-14 | Stop reason: HOSPADM

## 2020-09-03 RX ORDER — ONDANSETRON 2 MG/ML
4 INJECTION INTRAMUSCULAR; INTRAVENOUS PRN
Status: ACTIVE | OUTPATIENT
Start: 2020-09-03 | End: 2020-09-03

## 2020-09-03 RX ORDER — MIDAZOLAM HYDROCHLORIDE 1 MG/ML
.5-2 INJECTION INTRAMUSCULAR; INTRAVENOUS PRN
Status: ACTIVE | OUTPATIENT
Start: 2020-09-03 | End: 2020-09-03

## 2020-09-03 RX ORDER — FLUCONAZOLE 100 MG/1
200 TABLET ORAL DAILY
Status: COMPLETED | OUTPATIENT
Start: 2020-09-03 | End: 2020-09-07

## 2020-09-03 RX ORDER — MIDAZOLAM HYDROCHLORIDE 1 MG/ML
INJECTION INTRAMUSCULAR; INTRAVENOUS
Status: COMPLETED
Start: 2020-09-03 | End: 2020-09-03

## 2020-09-03 RX ORDER — SODIUM CHLORIDE 9 MG/ML
500 INJECTION, SOLUTION INTRAVENOUS
Status: ACTIVE | OUTPATIENT
Start: 2020-09-03 | End: 2020-09-03

## 2020-09-03 RX ORDER — POTASSIUM CHLORIDE 20 MEQ/1
40 TABLET, EXTENDED RELEASE ORAL 2 TIMES DAILY
Status: COMPLETED | OUTPATIENT
Start: 2020-09-03 | End: 2020-09-03

## 2020-09-03 RX ADMIN — IVABRADINE 7.5 MG: 7.5 TABLET, FILM COATED ORAL at 09:20

## 2020-09-03 RX ADMIN — MIDAZOLAM HYDROCHLORIDE 1 MG: 1 INJECTION INTRAMUSCULAR; INTRAVENOUS at 15:15

## 2020-09-03 RX ADMIN — IVABRADINE 7.5 MG: 7.5 TABLET, FILM COATED ORAL at 17:43

## 2020-09-03 RX ADMIN — ASPIRIN 81 MG: 81 TABLET, COATED ORAL at 05:45

## 2020-09-03 RX ADMIN — MEROPENEM 500 MG: 500 INJECTION, POWDER, FOR SOLUTION INTRAVENOUS at 11:46

## 2020-09-03 RX ADMIN — METHOCARBAMOL TABLETS 750 MG: 750 TABLET, COATED ORAL at 17:39

## 2020-09-03 RX ADMIN — PREGABALIN 300 MG: 150 CAPSULE ORAL at 17:31

## 2020-09-03 RX ADMIN — METHOCARBAMOL TABLETS 750 MG: 750 TABLET, COATED ORAL at 09:10

## 2020-09-03 RX ADMIN — FENTANYL CITRATE 50 MCG: 50 INJECTION INTRAMUSCULAR; INTRAVENOUS at 15:15

## 2020-09-03 RX ADMIN — MINERAL OIL, PETROLATUM 1 APPLICATION: 425; 573 OINTMENT OPHTHALMIC at 05:43

## 2020-09-03 RX ADMIN — DOCUSATE SODIUM 50 MG AND SENNOSIDES 8.6 MG 2 TABLET: 8.6; 5 TABLET, FILM COATED ORAL at 17:30

## 2020-09-03 RX ADMIN — SODIUM BICARBONATE 650 MG: 650 TABLET ORAL at 09:10

## 2020-09-03 RX ADMIN — TOPIRAMATE 50 MG: 25 TABLET, FILM COATED ORAL at 05:45

## 2020-09-03 RX ADMIN — PREGABALIN 300 MG: 150 CAPSULE ORAL at 06:17

## 2020-09-03 RX ADMIN — TRAZODONE HYDROCHLORIDE 100 MG: 100 TABLET ORAL at 20:17

## 2020-09-03 RX ADMIN — ACETAZOLAMIDE 1000 MG: 500 CAPSULE, EXTENDED RELEASE ORAL at 17:44

## 2020-09-03 RX ADMIN — MINERAL OIL, PETROLATUM 1 APPLICATION: 425; 573 OINTMENT OPHTHALMIC at 12:01

## 2020-09-03 RX ADMIN — OMEPRAZOLE 20 MG: 20 CAPSULE, DELAYED RELEASE ORAL at 05:45

## 2020-09-03 RX ADMIN — OXYCODONE HYDROCHLORIDE 10 MG: 5 TABLET ORAL at 11:47

## 2020-09-03 RX ADMIN — LORAZEPAM 2 MG: 2 INJECTION INTRAMUSCULAR; INTRAVENOUS at 18:23

## 2020-09-03 RX ADMIN — ACETAMINOPHEN 500 MG: 500 TABLET ORAL at 17:21

## 2020-09-03 RX ADMIN — OXYBUTYNIN CHLORIDE 5 MG: 5 TABLET ORAL at 11:48

## 2020-09-03 RX ADMIN — MICONAZOLE NITRATE: 20 CREAM TOPICAL at 05:42

## 2020-09-03 RX ADMIN — ZIPRASIDONE HYDROCHLORIDE 40 MG: 40 CAPSULE ORAL at 05:44

## 2020-09-03 RX ADMIN — METHOCARBAMOL TABLETS 750 MG: 750 TABLET, COATED ORAL at 11:48

## 2020-09-03 RX ADMIN — ONDANSETRON 4 MG: 2 INJECTION INTRAMUSCULAR; INTRAVENOUS at 13:37

## 2020-09-03 RX ADMIN — BUSPIRONE HYDROCHLORIDE 10 MG: 10 TABLET ORAL at 20:17

## 2020-09-03 RX ADMIN — MEROPENEM 500 MG: 500 INJECTION, POWDER, FOR SOLUTION INTRAVENOUS at 23:50

## 2020-09-03 RX ADMIN — POTASSIUM CHLORIDE 40 MEQ: 1500 TABLET, EXTENDED RELEASE ORAL at 17:30

## 2020-09-03 RX ADMIN — SODIUM BICARBONATE 650 MG: 650 TABLET ORAL at 03:32

## 2020-09-03 RX ADMIN — FLUCONAZOLE 200 MG: 100 TABLET ORAL at 12:27

## 2020-09-03 RX ADMIN — OXYBUTYNIN CHLORIDE 5 MG: 5 TABLET ORAL at 17:38

## 2020-09-03 RX ADMIN — PREDNISONE 10 MG: 10 TABLET ORAL at 05:44

## 2020-09-03 RX ADMIN — BUSPIRONE HYDROCHLORIDE 10 MG: 10 TABLET ORAL at 09:10

## 2020-09-03 RX ADMIN — OXCARBAZEPINE 150 MG: 150 TABLET, FILM COATED ORAL at 05:45

## 2020-09-03 RX ADMIN — SODIUM BICARBONATE 650 MG: 650 TABLET ORAL at 17:34

## 2020-09-03 RX ADMIN — TOPIRAMATE 50 MG: 25 TABLET, FILM COATED ORAL at 17:32

## 2020-09-03 RX ADMIN — OXCARBAZEPINE 150 MG: 150 TABLET, FILM COATED ORAL at 17:38

## 2020-09-03 RX ADMIN — METHOCARBAMOL TABLETS 750 MG: 750 TABLET, COATED ORAL at 20:16

## 2020-09-03 RX ADMIN — MIDAZOLAM HYDROCHLORIDE 1 MG: 1 INJECTION, SOLUTION INTRAMUSCULAR; INTRAVENOUS at 15:15

## 2020-09-03 RX ADMIN — MINERAL OIL, PETROLATUM 1 APPLICATION: 425; 573 OINTMENT OPHTHALMIC at 17:43

## 2020-09-03 RX ADMIN — DOCUSATE SODIUM 100 MG: 100 CAPSULE, LIQUID FILLED ORAL at 17:32

## 2020-09-03 RX ADMIN — FLUOXETINE 40 MG: 20 CAPSULE ORAL at 05:44

## 2020-09-03 RX ADMIN — POTASSIUM CHLORIDE 40 MEQ: 1500 TABLET, EXTENDED RELEASE ORAL at 09:10

## 2020-09-03 RX ADMIN — LEVOTHYROXINE SODIUM 75 MCG: 0.07 TABLET ORAL at 05:44

## 2020-09-03 RX ADMIN — DOCUSATE SODIUM 100 MG: 100 CAPSULE, LIQUID FILLED ORAL at 05:45

## 2020-09-03 RX ADMIN — ZIPRASIDONE HYDROCHLORIDE 40 MG: 40 CAPSULE ORAL at 17:41

## 2020-09-03 RX ADMIN — MYCOPHENOLATE MOFETIL 1000 MG: 250 CAPSULE ORAL at 17:40

## 2020-09-03 RX ADMIN — MYCOPHENOLATE MOFETIL 1000 MG: 250 CAPSULE ORAL at 05:44

## 2020-09-03 RX ADMIN — DOCUSATE SODIUM 50 MG AND SENNOSIDES 8.6 MG 2 TABLET: 8.6; 5 TABLET, FILM COATED ORAL at 05:45

## 2020-09-03 RX ADMIN — OXYBUTYNIN CHLORIDE 5 MG: 5 TABLET ORAL at 05:45

## 2020-09-03 RX ADMIN — SODIUM BICARBONATE 650 MG: 650 TABLET ORAL at 20:17

## 2020-09-03 RX ADMIN — MINERAL OIL, PETROLATUM 1 APPLICATION: 425; 573 OINTMENT OPHTHALMIC at 23:50

## 2020-09-03 RX ADMIN — ACETAZOLAMIDE 1500 MG: 500 CAPSULE, EXTENDED RELEASE ORAL at 05:45

## 2020-09-03 RX ADMIN — IOHEXOL 20 ML: 300 INJECTION, SOLUTION INTRAVENOUS at 15:30

## 2020-09-03 RX ADMIN — GABAPENTIN 300 MG: 300 CAPSULE ORAL at 17:32

## 2020-09-03 RX ADMIN — GABAPENTIN 300 MG: 300 CAPSULE ORAL at 11:48

## 2020-09-03 RX ADMIN — GABAPENTIN 300 MG: 300 CAPSULE ORAL at 05:44

## 2020-09-03 RX ADMIN — MEROPENEM 500 MG: 500 INJECTION, POWDER, FOR SOLUTION INTRAVENOUS at 17:28

## 2020-09-03 ASSESSMENT — ENCOUNTER SYMPTOMS
ABDOMINAL PAIN: 1
BLURRED VISION: 1
HEADACHES: 0
HEMOPTYSIS: 0
DIARRHEA: 0
FOCAL WEAKNESS: 1
SPUTUM PRODUCTION: 0
CONSTIPATION: 0
SHORTNESS OF BREATH: 0
COUGH: 0
DIZZINESS: 0
VOMITING: 0
MYALGIAS: 1
WHEEZING: 0

## 2020-09-03 NOTE — OR SURGEON
Immediate Post- Operative Note        PostOp Diagnosis: UTI, SPT in place      Procedure(s): SPT change with upsizing to 20F      Estimated Blood Loss: Less than 5 ml        Complications: None            9/3/2020     3:24 PM     Claude Zabala M.D.

## 2020-09-03 NOTE — PROGRESS NOTES
Call out to Dr. Velasquez. Patient sounds like she is wheezing. Albuterol inhaler give earlier. Respiratory did come and gave her breathing treatment and the inhaler. Patient improved a tiny bit. Respiratory called again gave a breathing treatment. Lungs sound good Patient oxygen is at 94 % on room air. Dr. Velasquez called right back. She is putting new orders in now.

## 2020-09-03 NOTE — PROGRESS NOTES
Hospital Medicine Daily Progress Note    Date of Service  9/3/2020     Chief Complaint  29 y.o. female admitted 8/15/2020 with blurred vision.    Hospital Course    PMH transverse myelitis, asthma, chronic relapsing inflammatory optic neuropathy, suprapubic cath, DM who presented with HA and blurred vision. CTA head and neck and TTE were unremarkable. Dr. Yousif was consulted, recommended conservative medication management for her transient monocular visual loss. She also was found with UTI ESBL klebsiella. She is unable to return home, grandmother unable to care for her. Case management has been closely involved.       Interval Problem Update  Last night patient had episode of shortness of breath, chest x-ray showed no acute process, patient was given a breathing treatment without improvement, Ativan was given which resolved her dyspnea.  Patient has had no repeat dyspnea since.  UA yesterday did show numerous WBCs and positive leukocyte esterase will restart Merrem as patient has history of ESBL, cultures pending. Also showed budding yeast, which were not present on previous UA, will start antifungals.     Consultants/Specialty  Ophtho Dr. Soliz  Neuro-ophtho Dr. Yousif    Code Status  Full Code    Disposition  grandmother unable to care for her  Longterm SNF pending      I certify that the patient requires continued medically necessary services for the treatment of recurrent urinary tract infections in the presence of a suprapubic catheter and will remain in the hospital for 20 days.  Discharge plan is anticipated to be to skilled nursing facility.    Review of Systems  Review of Systems   Constitutional: Positive for malaise/fatigue.   Eyes: Positive for blurred vision (mild).   Respiratory: Negative for cough, hemoptysis, sputum production, shortness of breath and wheezing.    Cardiovascular: Negative for chest pain and leg swelling.   Gastrointestinal: Positive for abdominal pain (around cath site).  Negative for constipation, diarrhea and vomiting.   Genitourinary: Negative for dysuria.   Musculoskeletal: Positive for myalgias.        Pain b/l feet    Skin: Negative for rash.   Neurological: Positive for focal weakness (chronic). Negative for dizziness and headaches.        Physical Exam  Temp:  [36.3 °C (97.3 °F)-36.5 °C (97.7 °F)] 36.4 °C (97.5 °F)  Pulse:  [63-87] 64  Resp:  [17-20] 17  BP: ()/(48-71) 110/55  SpO2:  [92 %-96 %] 95 %    Physical Exam  Constitutional:       Appearance: She is obese. She is not toxic-appearing or diaphoretic.      Comments: Appears fatigued   HENT:      Head: Normocephalic and atraumatic.      Mouth/Throat:      Mouth: Mucous membranes are moist.   Eyes:      General:         Right eye: No discharge.         Left eye: No discharge.   Neck:      Musculoskeletal: Neck supple.   Cardiovascular:      Rate and Rhythm: Normal rate and regular rhythm.      Pulses: Normal pulses.   Pulmonary:      Breath sounds: No stridor. No wheezing, rhonchi or rales.      Comments: Diminished througout  Chest:      Chest wall: No tenderness.   Abdominal:      Tenderness: There is abdominal tenderness (suprapubic near cath site). There is no guarding or rebound.      Comments: suprapubic cath in place, no drainage seen, mildly erythematous around tube   Musculoskeletal:         General: Tenderness (b/l feet and ankles ) present.      Comments: Nonpitting edema to legs   Skin:     General: Skin is warm and dry.   Neurological:      Mental Status: She is oriented to person, place, and time. She is lethargic.      Motor: Weakness present.         Fluids    Intake/Output Summary (Last 24 hours) at 9/3/2020 1158  Last data filed at 9/3/2020 0757  Gross per 24 hour   Intake 480 ml   Output 1700 ml   Net -1220 ml       Laboratory  Recent Labs     09/02/20  0047   WBC 5.5   RBC 4.29   HEMOGLOBIN 12.5   HEMATOCRIT 39.1   MCV 91.1   MCH 29.1   MCHC 32.0*   RDW 43.2   PLATELETCT 163*   MPV 12.2      Recent Labs     09/02/20  0047 09/03/20  0011   SODIUM 140 141   POTASSIUM 3.4* 3.2*   CHLORIDE 109 113*   CO2 16* 17*   GLUCOSE 107* 88   BUN 14 11   CREATININE 0.61 0.67   CALCIUM 8.9 8.6                   Imaging  DX-CHEST-LIMITED (1 VIEW)   Final Result      Hypoinflation without other evidence for acute cardiopulmonary disease.      IR-US GUIDED PIV   Final Result    Ultrasound-guided PERIPHERAL IV INSERTION performed by    qualified nursing staff as above.      CT-CTA CHEST PULMONARY ARTERY W/ RECONS   Final Result      No CT evidence of acute pulmonary thromboembolism      Mild splenomegaly      New small pleural effusions            US-BLADDER   Final Result         1.  Right ureteral jet is not definitively identified, otherwise unremarkable exam.      DX-SHOULDER 2+ LEFT   Final Result      1.  Normal left shoulder series.      DX-CHEST-PORTABLE (1 VIEW)   Final Result         1.  No acute cardiopulmonary disease.      US-BLADDER   Final Result      Suprapubic catheter balloon is inflated, presumably within the collapsed bladder. If more definitive analysis is required, consider clamping the bladder catheter for  hours prior to repeat ultrasound, allowing the bladder to fill with fluid and therefore    better see the bladder walls      CT-HEAD W/O   Final Result         1.  No acute intracranial abnormality.      DX-WRIST-COMPLETE 3+ RIGHT   Final Result      No acute fracture.  If pain persists, recommend repeat imaging in 7 to 10 days.      EC-ECHOCARDIOGRAM COMPLETE W/ CONT   Final Result      CT-CTA NECK WITH & W/O-POST PROCESSING   Final Result      No high-grade stenosis, large vessel occlusion, aneurysm or dissection.      CT-CTA HEAD WITH & W/O-POST PROCESS   Final Result      No intracranial aneurysm, focal stenosis, or abrupt large vessel cut off.      US-CAROTID DOPPLER BILAT   Final Result      CT-HEAD W/O   Final Result      No CT evidence of acute infarct, hemorrhage or mass.       IR-DRAINAGE-CATH EXCHANGE    (Results Pending)        Assessment/Plan  * Urinary tract infection associated with catheterization of urinary tract (HCC)- (present on admission)  Assessment & Plan  ESBL klebsiella--> IV meropenem x 7 days completed  UA 9/2/2020 shows budding yeast, and new UTI  Start fluconazole and Merrem  Urine culture pending  Order placed for suprapubic catheter exchange    Optic neuritis- (present on admission)  Assessment & Plan  Continue outpatient prednisone   ophthalmology consulted, Dr. Aparicio.. signed off  continue home Cellcept   cta head and neck--WNL  Echo.  Within normal limits  hypercoag w./u.. Unremarkable except for a mild elevation of homocystine            Diabetes mellitus type 2 in obese (HCC)- (present on admission)  Assessment & Plan  Lantus decreased to 8U qhs  Follow-up A1c is 5  Glucose well controlled  ISS    Presence of suprapubic catheter (HCC)- (present on admission)  Assessment & Plan  Was placed 8/6/2020  Intermittent hematuria. Bladder scans have been umremarkable. Hematuria improved after stopping lovenox dvt ppx.  8/26 - spoke with Dr. Galicia, would wait 1 month to exchange cath, ok to change with IR after 1 more week, recommends upsizing  Placed IR order for suprapubic catheter exchange per urology recommendations    Morbidly obese (HCC)- (present on admission)  Assessment & Plan  BMI 42    Wrist injury, right, initial encounter  Assessment & Plan  Xray negative for fracture    Pain control    Dry eyes- (present on admission)  Assessment & Plan  Eye lubrication     Pain in both feet- (present on admission)  Assessment & Plan  Pain control with po oxycodone        Idiopathic intracranial hypertension- (present on admission)  Assessment & Plan  History of  She is followed by Dr. Yousif, neuro-ophthalmology   continue home Diamox  Supportive care as suggested by neuro-ophthalmology    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HCC)- (present on admission)  Assessment & Plan  Stable  at baseline nonambulatory state,    Blurred vision  Assessment & Plan  Presumed retinopathy  monitor    Mild intermittent asthma without complication- (present on admission)  Assessment & Plan  Intermittent flares, affected by smoke  RT protocol, albuterol PRN  CXR no acute process  Likely intermittent dyspnea has anxiety component, resolved with ativan      History of atrial fibrillation and cardiomyopathy?- (present on admission)  Assessment & Plan  No events on telemetry, will stop    Schizophrenia (HCC)- (present on admission)  Assessment & Plan  Reported history of   Continue Geodon and Prozac    Peripheral neuropathy and chornic pain syndrome (CMS-HCC)- (present on admission)  Assessment & Plan  At risk of morbid obesity hypoventilation, avoid excessive medications limiting respiratory drive.       VTE prophylaxis: scds, hematuria

## 2020-09-03 NOTE — PROGRESS NOTES
IR Nursing Note:    Patient consented for Suprapubic Catheter Upsize to 20F with imaging guidance by Dr. Zabala, all questions answered. Patient sedated at Dr. Zabala's direction, see MAR. The pt appeared to tolerate the procedure well.   Gauze applied to abdomen, CDI and soft.  Pt alert and verbally appropriate post procedure, vital signs stable during procedure and transport, see flow sheet for vital signs.  Report given to SONYA Espinal.  RN transported pt to [facility] with Sao2 monitor.      Bard 20F Catheter with 5cc balloon

## 2020-09-04 PROBLEM — R56.9 SEIZURE (HCC): Status: ACTIVE | Noted: 2020-09-04

## 2020-09-04 PROBLEM — R82.71 BACTERIURIA: Status: ACTIVE | Noted: 2017-05-13

## 2020-09-04 LAB
ALBUMIN SERPL BCP-MCNC: 3.8 G/DL (ref 3.2–4.9)
BASOPHILS # BLD AUTO: 0.4 % (ref 0–1.8)
BASOPHILS # BLD: 0.03 K/UL (ref 0–0.12)
BUN SERPL-MCNC: 12 MG/DL (ref 8–22)
CALCIUM SERPL-MCNC: 8.8 MG/DL (ref 8.5–10.5)
CHLORIDE SERPL-SCNC: 116 MMOL/L (ref 96–112)
CO2 SERPL-SCNC: 14 MMOL/L (ref 20–33)
CREAT SERPL-MCNC: 0.74 MG/DL (ref 0.5–1.4)
EKG IMPRESSION: NORMAL
EOSINOPHIL # BLD AUTO: 0.16 K/UL (ref 0–0.51)
EOSINOPHIL NFR BLD: 2.3 % (ref 0–6.9)
ERYTHROCYTE [DISTWIDTH] IN BLOOD BY AUTOMATED COUNT: 45.6 FL (ref 35.9–50)
GLUCOSE SERPL-MCNC: 118 MG/DL (ref 65–99)
HCT VFR BLD AUTO: 40.1 % (ref 37–47)
HGB BLD-MCNC: 12.4 G/DL (ref 12–16)
IMM GRANULOCYTES # BLD AUTO: 0.05 K/UL (ref 0–0.11)
IMM GRANULOCYTES NFR BLD AUTO: 0.7 % (ref 0–0.9)
LYMPHOCYTES # BLD AUTO: 1.13 K/UL (ref 1–4.8)
LYMPHOCYTES NFR BLD: 16.2 % (ref 22–41)
MCH RBC QN AUTO: 29 PG (ref 27–33)
MCHC RBC AUTO-ENTMCNC: 30.9 G/DL (ref 33.6–35)
MCV RBC AUTO: 93.7 FL (ref 81.4–97.8)
MONOCYTES # BLD AUTO: 0.36 K/UL (ref 0–0.85)
MONOCYTES NFR BLD AUTO: 5.2 % (ref 0–13.4)
NEUTROPHILS # BLD AUTO: 5.23 K/UL (ref 2–7.15)
NEUTROPHILS NFR BLD: 75.2 % (ref 44–72)
NRBC # BLD AUTO: 0 K/UL
NRBC BLD-RTO: 0 /100 WBC
PHOSPHATE SERPL-MCNC: 3 MG/DL (ref 2.5–4.5)
PLATELET # BLD AUTO: 150 K/UL (ref 164–446)
PMV BLD AUTO: 12.4 FL (ref 9–12.9)
POTASSIUM SERPL-SCNC: 4.1 MMOL/L (ref 3.6–5.5)
RBC # BLD AUTO: 4.28 M/UL (ref 4.2–5.4)
SODIUM SERPL-SCNC: 142 MMOL/L (ref 135–145)
WBC # BLD AUTO: 7 K/UL (ref 4.8–10.8)

## 2020-09-04 PROCEDURE — A9270 NON-COVERED ITEM OR SERVICE: HCPCS | Performed by: HOSPITALIST

## 2020-09-04 PROCEDURE — 700102 HCHG RX REV CODE 250 W/ 637 OVERRIDE(OP): Performed by: HOSPITALIST

## 2020-09-04 PROCEDURE — 99232 SBSQ HOSP IP/OBS MODERATE 35: CPT | Performed by: FAMILY MEDICINE

## 2020-09-04 PROCEDURE — 700111 HCHG RX REV CODE 636 W/ 250 OVERRIDE (IP): Performed by: INTERNAL MEDICINE

## 2020-09-04 PROCEDURE — 4A10X4Z MONITORING OF CENTRAL NERVOUS ELECTRICAL ACTIVITY, EXTERNAL APPROACH: ICD-10-PCS | Performed by: PSYCHIATRY & NEUROLOGY

## 2020-09-04 PROCEDURE — A9270 NON-COVERED ITEM OR SERVICE: HCPCS | Performed by: INTERNAL MEDICINE

## 2020-09-04 PROCEDURE — 93005 ELECTROCARDIOGRAM TRACING: CPT | Performed by: FAMILY MEDICINE

## 2020-09-04 PROCEDURE — 700105 HCHG RX REV CODE 258: Performed by: INTERNAL MEDICINE

## 2020-09-04 PROCEDURE — 700102 HCHG RX REV CODE 250 W/ 637 OVERRIDE(OP): Performed by: INTERNAL MEDICINE

## 2020-09-04 PROCEDURE — 95819 EEG AWAKE AND ASLEEP: CPT | Performed by: PSYCHIATRY & NEUROLOGY

## 2020-09-04 PROCEDURE — 80069 RENAL FUNCTION PANEL: CPT

## 2020-09-04 PROCEDURE — 700111 HCHG RX REV CODE 636 W/ 250 OVERRIDE (IP): Performed by: HOSPITALIST

## 2020-09-04 PROCEDURE — 99221 1ST HOSP IP/OBS SF/LOW 40: CPT | Mod: 25 | Performed by: PSYCHIATRY & NEUROLOGY

## 2020-09-04 PROCEDURE — 85025 COMPLETE CBC W/AUTO DIFF WBC: CPT

## 2020-09-04 PROCEDURE — 36415 COLL VENOUS BLD VENIPUNCTURE: CPT

## 2020-09-04 PROCEDURE — A9270 NON-COVERED ITEM OR SERVICE: HCPCS | Performed by: NURSE PRACTITIONER

## 2020-09-04 PROCEDURE — 700102 HCHG RX REV CODE 250 W/ 637 OVERRIDE(OP): Performed by: NURSE PRACTITIONER

## 2020-09-04 PROCEDURE — 93010 ELECTROCARDIOGRAM REPORT: CPT | Performed by: INTERNAL MEDICINE

## 2020-09-04 PROCEDURE — 770021 HCHG ROOM/CARE - ISO PRIVATE

## 2020-09-04 PROCEDURE — 770020 HCHG ROOM/CARE - TELE (206)

## 2020-09-04 PROCEDURE — 95819 EEG AWAKE AND ASLEEP: CPT | Mod: 26 | Performed by: PSYCHIATRY & NEUROLOGY

## 2020-09-04 RX ADMIN — DOCUSATE SODIUM 50 MG AND SENNOSIDES 8.6 MG 2 TABLET: 8.6; 5 TABLET, FILM COATED ORAL at 17:26

## 2020-09-04 RX ADMIN — METHOCARBAMOL TABLETS 750 MG: 750 TABLET, COATED ORAL at 20:18

## 2020-09-04 RX ADMIN — SODIUM BICARBONATE 650 MG: 650 TABLET ORAL at 16:08

## 2020-09-04 RX ADMIN — MICONAZOLE NITRATE: 20 CREAM TOPICAL at 17:30

## 2020-09-04 RX ADMIN — OXYBUTYNIN CHLORIDE 5 MG: 5 TABLET ORAL at 13:27

## 2020-09-04 RX ADMIN — TRAZODONE HYDROCHLORIDE 100 MG: 100 TABLET ORAL at 20:18

## 2020-09-04 RX ADMIN — SODIUM BICARBONATE 650 MG: 650 TABLET ORAL at 08:38

## 2020-09-04 RX ADMIN — GABAPENTIN 300 MG: 300 CAPSULE ORAL at 13:27

## 2020-09-04 RX ADMIN — OXYCODONE HYDROCHLORIDE 5 MG: 5 TABLET ORAL at 16:08

## 2020-09-04 RX ADMIN — ACETAZOLAMIDE 1000 MG: 500 CAPSULE, EXTENDED RELEASE ORAL at 17:28

## 2020-09-04 RX ADMIN — FLUOXETINE 40 MG: 20 CAPSULE ORAL at 04:01

## 2020-09-04 RX ADMIN — SODIUM BICARBONATE 650 MG: 650 TABLET ORAL at 20:18

## 2020-09-04 RX ADMIN — DOCUSATE SODIUM 100 MG: 100 CAPSULE, LIQUID FILLED ORAL at 04:02

## 2020-09-04 RX ADMIN — OXCARBAZEPINE 150 MG: 150 TABLET, FILM COATED ORAL at 17:43

## 2020-09-04 RX ADMIN — PREGABALIN 300 MG: 150 CAPSULE ORAL at 17:26

## 2020-09-04 RX ADMIN — BUSPIRONE HYDROCHLORIDE 10 MG: 10 TABLET ORAL at 20:18

## 2020-09-04 RX ADMIN — ACETAZOLAMIDE 1500 MG: 500 CAPSULE, EXTENDED RELEASE ORAL at 04:01

## 2020-09-04 RX ADMIN — TOPIRAMATE 50 MG: 25 TABLET, FILM COATED ORAL at 04:02

## 2020-09-04 RX ADMIN — BUSPIRONE HYDROCHLORIDE 10 MG: 10 TABLET ORAL at 07:28

## 2020-09-04 RX ADMIN — OXYCODONE HYDROCHLORIDE 10 MG: 5 TABLET ORAL at 02:23

## 2020-09-04 RX ADMIN — OXYBUTYNIN CHLORIDE 5 MG: 5 TABLET ORAL at 04:02

## 2020-09-04 RX ADMIN — DOCUSATE SODIUM 50 MG AND SENNOSIDES 8.6 MG 2 TABLET: 8.6; 5 TABLET, FILM COATED ORAL at 04:03

## 2020-09-04 RX ADMIN — OXCARBAZEPINE 150 MG: 150 TABLET, FILM COATED ORAL at 04:02

## 2020-09-04 RX ADMIN — GABAPENTIN 300 MG: 300 CAPSULE ORAL at 04:02

## 2020-09-04 RX ADMIN — METHOCARBAMOL TABLETS 750 MG: 750 TABLET, COATED ORAL at 08:39

## 2020-09-04 RX ADMIN — LORAZEPAM 0.5 MG: 2 INJECTION INTRAMUSCULAR; INTRAVENOUS at 05:24

## 2020-09-04 RX ADMIN — FLUCONAZOLE 200 MG: 100 TABLET ORAL at 04:02

## 2020-09-04 RX ADMIN — PREDNISONE 10 MG: 10 TABLET ORAL at 04:02

## 2020-09-04 RX ADMIN — MEROPENEM 500 MG: 500 INJECTION, POWDER, FOR SOLUTION INTRAVENOUS at 04:11

## 2020-09-04 RX ADMIN — ZIPRASIDONE HYDROCHLORIDE 40 MG: 40 CAPSULE ORAL at 08:38

## 2020-09-04 RX ADMIN — IVABRADINE 7.5 MG: 7.5 TABLET, FILM COATED ORAL at 16:08

## 2020-09-04 RX ADMIN — SODIUM BICARBONATE 650 MG: 650 TABLET ORAL at 04:02

## 2020-09-04 RX ADMIN — INSULIN GLARGINE 8 UNITS: 100 INJECTION, SOLUTION SUBCUTANEOUS at 17:31

## 2020-09-04 RX ADMIN — DOCUSATE SODIUM 100 MG: 100 CAPSULE, LIQUID FILLED ORAL at 17:28

## 2020-09-04 RX ADMIN — OMEPRAZOLE 20 MG: 20 CAPSULE, DELAYED RELEASE ORAL at 04:02

## 2020-09-04 RX ADMIN — MYCOPHENOLATE MOFETIL 1000 MG: 250 CAPSULE ORAL at 04:01

## 2020-09-04 RX ADMIN — GABAPENTIN 300 MG: 300 CAPSULE ORAL at 17:28

## 2020-09-04 RX ADMIN — ASPIRIN 81 MG: 81 TABLET, COATED ORAL at 04:02

## 2020-09-04 RX ADMIN — OXYBUTYNIN CHLORIDE 5 MG: 5 TABLET ORAL at 17:28

## 2020-09-04 RX ADMIN — TOPIRAMATE 50 MG: 25 TABLET, FILM COATED ORAL at 17:28

## 2020-09-04 RX ADMIN — OXYCODONE HYDROCHLORIDE 5 MG: 5 TABLET ORAL at 07:28

## 2020-09-04 RX ADMIN — LEVOTHYROXINE SODIUM 75 MCG: 0.07 TABLET ORAL at 04:02

## 2020-09-04 RX ADMIN — MYCOPHENOLATE MOFETIL 1000 MG: 250 CAPSULE ORAL at 17:43

## 2020-09-04 RX ADMIN — ZIPRASIDONE HYDROCHLORIDE 40 MG: 40 CAPSULE ORAL at 16:08

## 2020-09-04 RX ADMIN — OXYCODONE HYDROCHLORIDE 10 MG: 5 TABLET ORAL at 20:18

## 2020-09-04 RX ADMIN — ALBUTEROL SULFATE 2 PUFF: 90 AEROSOL, METERED RESPIRATORY (INHALATION) at 06:47

## 2020-09-04 RX ADMIN — MINERAL OIL, PETROLATUM 1 APPLICATION: 425; 573 OINTMENT OPHTHALMIC at 04:02

## 2020-09-04 RX ADMIN — BUTALBITAL, ACETAMINOPHEN, AND CAFFEINE 2 TABLET: 50; 325; 40 TABLET ORAL at 05:23

## 2020-09-04 RX ADMIN — PREGABALIN 300 MG: 150 CAPSULE ORAL at 04:02

## 2020-09-04 RX ADMIN — METHOCARBAMOL TABLETS 750 MG: 750 TABLET, COATED ORAL at 16:08

## 2020-09-04 RX ADMIN — METHOCARBAMOL TABLETS 750 MG: 750 TABLET, COATED ORAL at 13:27

## 2020-09-04 RX ADMIN — MICONAZOLE NITRATE: 20 CREAM TOPICAL at 04:02

## 2020-09-04 ASSESSMENT — ENCOUNTER SYMPTOMS
HEMOPTYSIS: 0
ABDOMINAL PAIN: 1
FOCAL WEAKNESS: 1
HEADACHES: 0
DIARRHEA: 0
DEPRESSION: 0
PHOTOPHOBIA: 0
MYALGIAS: 1
COUGH: 0
DIZZINESS: 0
VOMITING: 0
DOUBLE VISION: 0
PALPITATIONS: 0
BLURRED VISION: 1
SPUTUM PRODUCTION: 0

## 2020-09-04 ASSESSMENT — PAIN DESCRIPTION - PAIN TYPE
TYPE: CHRONIC PAIN

## 2020-09-04 NOTE — Clinical Note
Patient had a second what looks to be a seizure. Patient's head went back, hands were in a fist position. Patient came unresponsive. RN and CNA at bedside when this happen. Call out to the doctor. Ativan given. Patient will be going to CT scan and waiting for a bed on neuro.

## 2020-09-04 NOTE — PROGRESS NOTES
2 RN skin check completed with SONYA Mi.  Redness/flushing to the face.  Generalized bruising on bilateral arms and abdomen.   Red scabbing to the L breast.  Suprapubic catheter site is clean, dry, red, and edematous.   Bilateral heels are dry and flaky.   No other wounds or pressure injuries noted at this time.

## 2020-09-04 NOTE — PROGRESS NOTES
Hospital Medicine Daily Progress Note    Date of Service  9/4/2020     Chief Complaint  29 y.o. female admitted 8/15/2020 with blurred vision.    Hospital Course    PMH transverse myelitis, asthma, chronic relapsing inflammatory optic neuropathy, suprapubic cath, DM who presented with HA and blurred vision. CTA head and neck and TTE were unremarkable. Dr. Yousif was consulted, recommended conservative medication management for her transient monocular visual loss. She also was found with UTI ESBL klebsiella. She is unable to return home, grandmother unable to care for her. Case management has been closely involved.       Interval Problem Update  Last night patient had episode of shortness of breath, chest x-ray showed no acute process, patient was given a breathing treatment without improvement, Ativan was given which resolved her dyspnea.  Patient has had no repeat dyspnea since.  UA yesterday did show numerous WBCs and positive leukocyte esterase will restart Merrem as patient has history of ESBL, cultures pending. Also showed budding yeast, which were not present on previous UA, will start antifungals.   9/4: Laying in bed.  Lethargic.  Easily aroused to stimulus but just quickly back to sleep.  No acute distress noted.  No seizure activity has been noted by staff overnight.  Afebrile.  Hemodynamically stable.  Pending EEG ordered yesterday for presumed seizure activity.  Suprapubic Andrade catheter has been replaced yesterday by IR.  No issues overnight per staff.  Consultants/Specialty  Ophtho Dr. Soliz  Neuro-ophtho Dr. Yousif  Neurology  Code Status  Full Code    Disposition  grandmother unable to care for her  Longterm SNF pending      I certify that the patient requires continued medically necessary services for the treatment of recurrent urinary tract infections in the presence of a suprapubic catheter and will remain in the hospital for 20 days.  Discharge plan is anticipated to be to skilled nursing  facility.    Review of Systems  Review of Systems   Constitutional: Positive for malaise/fatigue.   HENT: Negative for hearing loss.    Eyes: Positive for blurred vision (mild). Negative for double vision and photophobia.   Respiratory: Negative for cough, hemoptysis and sputum production.    Cardiovascular: Negative for chest pain, palpitations and leg swelling.   Gastrointestinal: Positive for abdominal pain (around cath site). Negative for diarrhea and vomiting.   Genitourinary: Negative for dysuria and urgency.   Musculoskeletal: Positive for myalgias.        Pain b/l feet    Skin: Negative for rash.   Neurological: Positive for focal weakness (chronic). Negative for dizziness and headaches.   Psychiatric/Behavioral: Negative for depression.        Physical Exam  Temp:  [36.1 °C (97 °F)-37 °C (98.6 °F)] 36.2 °C (97.2 °F)  Pulse:  [60-76] 74  Resp:  [16-22] 20  BP: ()/(51-75) 104/56  SpO2:  [90 %-98 %] 98 %    Physical Exam  Constitutional:       Appearance: She is obese. She is not toxic-appearing or diaphoretic.      Comments: Appears fatigued   HENT:      Head: Normocephalic and atraumatic.      Mouth/Throat:      Mouth: Mucous membranes are moist.   Eyes:      General:         Right eye: No discharge.         Left eye: No discharge.   Neck:      Musculoskeletal: Neck supple.   Cardiovascular:      Rate and Rhythm: Normal rate and regular rhythm.      Pulses: Normal pulses.   Pulmonary:      Breath sounds: No stridor. No wheezing, rhonchi or rales.      Comments: Diminished througout  Abdominal:      General: There is no distension.      Tenderness: Tenderness: suprapubic near cath site. There is no guarding.      Comments: suprapubic cath in place, no drainage seen, mildly erythematous around tube   Musculoskeletal:         General: Tenderness (b/l feet and ankles ) present.      Right lower leg: No edema.      Left lower leg: No edema.      Comments: Nonpitting edema to legs   Skin:     General: Skin  is warm and dry.   Neurological:      Mental Status: She is oriented to person, place, and time. She is lethargic.      Motor: Weakness present.         Fluids    Intake/Output Summary (Last 24 hours) at 9/4/2020 1142  Last data filed at 9/4/2020 0600  Gross per 24 hour   Intake 222 ml   Output 1550 ml   Net -1328 ml       Laboratory  Recent Labs     09/02/20  0047 09/04/20  0736   WBC 5.5 7.0   RBC 4.29 4.28   HEMOGLOBIN 12.5 12.4   HEMATOCRIT 39.1 40.1   MCV 91.1 93.7   MCH 29.1 29.0   MCHC 32.0* 30.9*   RDW 43.2 45.6   PLATELETCT 163* 150*   MPV 12.2 12.4     Recent Labs     09/03/20  0011 09/03/20  1711 09/04/20  0736   SODIUM 141 143 142   POTASSIUM 3.2* 3.9 4.1   CHLORIDE 113* 115* 116*   CO2 17* 15* 14*   GLUCOSE 88 96 118*   BUN 11 11 12   CREATININE 0.67 0.65 0.74   CALCIUM 8.6 8.9 8.8                   Imaging  CT-HEAD W/O   Final Result      No evidence of acute intracranial process.      IR-DRAINAGE-CATH EXCHANGE   Final Result      Successful image guided suprapubic tube replacement with upsizing.                  DX-CHEST-LIMITED (1 VIEW)   Final Result      Hypoinflation without other evidence for acute cardiopulmonary disease.      IR-US GUIDED PIV   Final Result    Ultrasound-guided PERIPHERAL IV INSERTION performed by    qualified nursing staff as above.      CT-CTA CHEST PULMONARY ARTERY W/ RECONS   Final Result      No CT evidence of acute pulmonary thromboembolism      Mild splenomegaly      New small pleural effusions            US-BLADDER   Final Result         1.  Right ureteral jet is not definitively identified, otherwise unremarkable exam.      DX-SHOULDER 2+ LEFT   Final Result      1.  Normal left shoulder series.      DX-CHEST-PORTABLE (1 VIEW)   Final Result         1.  No acute cardiopulmonary disease.      US-BLADDER   Final Result      Suprapubic catheter balloon is inflated, presumably within the collapsed bladder. If more definitive analysis is required, consider clamping the  bladder catheter for  hours prior to repeat ultrasound, allowing the bladder to fill with fluid and therefore    better see the bladder walls      CT-HEAD W/O   Final Result         1.  No acute intracranial abnormality.      DX-WRIST-COMPLETE 3+ RIGHT   Final Result      No acute fracture.  If pain persists, recommend repeat imaging in 7 to 10 days.      EC-ECHOCARDIOGRAM COMPLETE W/ CONT   Final Result      CT-CTA NECK WITH & W/O-POST PROCESSING   Final Result      No high-grade stenosis, large vessel occlusion, aneurysm or dissection.      CT-CTA HEAD WITH & W/O-POST PROCESS   Final Result      No intracranial aneurysm, focal stenosis, or abrupt large vessel cut off.      US-CAROTID DOPPLER BILAT   Final Result      CT-HEAD W/O   Final Result      No CT evidence of acute infarct, hemorrhage or mass.           Assessment/Plan  * Bacteriuria- (present on admission)  Assessment & Plan  Likely colonized urinary tract given history of indwelling suprapubic catheter  ESBL klebsiella--> IV meropenem x 7 days completed on 8/26  UA 9/2/2020 shows budding yeast, and new UTI  9/4: Repeated UA was done prior of Andrade catheter exchange which likely will per present colonization.  There was budding yeast on UA which also considered as colonization.  However, patient was started on Merrem again which will be discontinued.  Will only complete 5 days course of Diflucan since repeat urine culture was not obtained.    Optic neuritis- (present on admission)  Assessment & Plan  Continue outpatient prednisone   ophthalmology consulted, Dr. Aparicio.. signed off  continue home Cellcept   cta head and neck--WNL  Echo.  Within normal limits  hypercoag w./u.. Unremarkable except for a mild elevation of homocystine  No candidate for MRI due to bladder stimulator             Diabetes mellitus type 2 in obese (HCC)- (present on admission)  Assessment & Plan  Lantus decreased to 8U qhs  Follow-up A1c is 5  Glucose well  controlled  ISS    Presence of suprapubic catheter (HCC)- (present on admission)  Assessment & Plan  Was placed 8/6/2020  Intermittent hematuria. Bladder scans have been umremarkable. Hematuria improved after stopping lovenox dvt ppx.  8/26 - spoke with Dr. Galicia, would wait 1 month to exchange cath, ok to change with IR after 1 more week, recommends upsizing  Placed IR order for suprapubic catheter exchange per urology recommendations  9/4: Suprapubic cath exchanged by IR on 9/3.     Morbidly obese (HCC)- (present on admission)  Assessment & Plan  BMI 42    Seizure (HCC)- (present on admission)  Assessment & Plan  9/4: Noted to have 2 seizure activities on the floor by nursing staff followed by pos ictal state.  CT scan of the head was ordered which was negative for any acute intracranial findings.  EEG has been ordered as well.  Neurology consulted.  PRN Ativan for breakthrough seizures.    Wrist injury, right, initial encounter  Assessment & Plan  Xray negative for fracture    Pain control    Dry eyes- (present on admission)  Assessment & Plan  Eye lubrication     Pain in both feet- (present on admission)  Assessment & Plan  Pain control with po oxycodone        Idiopathic intracranial hypertension- (present on admission)  Assessment & Plan  History of  She is followed by Dr. Yousif, neuro-ophthalmology   continue home Diamox  Supportive care as suggested by neuro-ophthalmology    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HCC)- (present on admission)  Assessment & Plan  Stable at baseline nonambulatory state,    Blurred vision  Assessment & Plan  Presumed retinopathy  monitor    Mild intermittent asthma without complication- (present on admission)  Assessment & Plan  Intermittent flares, affected by smoke  RT protocol, albuterol PRN  CXR no acute process  Likely intermittent dyspnea has anxiety component, resolved with ativan      History of atrial fibrillation and cardiomyopathy?- (present on admission)  Assessment & Plan  No  events on telemetry, will stop    Schizophrenia (HCC)- (present on admission)  Assessment & Plan  Reported history of   Continue Geodon and Prozac    Peripheral neuropathy and chornic pain syndrome (CMS-HCC)- (present on admission)  Assessment & Plan  At risk of morbid obesity hypoventilation, avoid excessive medications limiting respiratory drive.       VTE prophylaxis: scds, hematuria

## 2020-09-04 NOTE — ASSESSMENT & PLAN NOTE
9/4: Noted to have 2 seizure activities on the floor by nursing staff followed by pos ictal state.  CT scan of the head was ordered which was negative for any acute intracranial findings.  EEG has been ordered as well.  Neurology consulted.  PRN Ativan for breakthrough seizures.  9/5: EEG was unremarkable.  Neurology not recommending any AED.  9/6: Had another seizure this morning that was aborted by Ativan.  Could be possibly psychogenic concerning that patient has conversion disorder and other psychological issues.  At this point psychiatry has no further recommendations.  We will continue to monitor and if patient develop another seizure, will consider continuous video EEG.    No repeat seizure activity may need VEEG if repeat activity

## 2020-09-04 NOTE — PROCEDURES
ROUTINE ELECTROENCEPHALOGRAM REPORT      Referring provider: Dr. Velasquez.     DOS: 9/4/2020 (total recording of 26 minutes)    INDICATION:  Kristin Balderrama 29 y.o. female presenting with visual changes.     TECHNIQUE: 30 channel routine electroencephalogram (EEG) was performed in accordance with the international 10-20 system. The study was reviewed in bipolar and referential montages. The recording examined the patient during wakeful and drowsy/sleep state(s).     DESCRIPTION OF THE RECORD:  During the wakefulness, the background showed a symmetrical 9 Hz alpha activity posteriorly with amplitude of 70 mV.  There was reactivity to eye closure/opening.  A normal anterior-posterior gradient was noted with faster beta frequencies seen anteriorly.  During drowsiness, theta/delta frequencies were seen.    During the sleep state, background shows diffuse high-amplitude 4-5 Hz delta activity.  Symmetrical high-amplitude sleep spindles and vertex sharps were seen in the leads over the central regions.     ACTIVATION PROCEDURES:   Intermittent Photic stimulation was performed in a stepwise fashion from 1 to 30 Hz and elicited a normal response (photic driving), most noticeable in the posterior leads.      ICTAL AND/OR INTERICTAL FINDINGS:   No focal or generalized epileptiform activity noted. No regional slowing was seen during this routine study.  No clinical events or seizures were reported or recorded during the study.     EKG: sampling of the EKG recording demonstrated sinus rhythm.       INTERPRETATION:  This is a normal routine EEG recording in the awake, drowsy/sleep state(s).  Clinical correlation is recommended.    Note: A normal EEG does not rule out epilepsy.  If the clinical suspicion remains high for seizures, a prolonged recording to capture clinical or subclinical events may be helpful.        Manpreet Gomes MD   Epilepsy and Neurodiagnostics.   Clinical  of Neurology  [facility].   Diplomate in Neurology, Epilepsy, and Electrodiagnostic Medicine.   Office: [phone]  Fax: [phone]

## 2020-09-04 NOTE — CARE PLAN
Problem: Communication  Goal: The ability to communicate needs accurately and effectively will improve  Outcome: PROGRESSING AS EXPECTED       Therapeutic communication utilized, patient is able to make needs known.       Problem: Pain Management  Goal: Pain level will decrease to patient's comfort goal  Outcome: PROGRESSING AS EXPECTED     Pain improved with PO meds.

## 2020-09-04 NOTE — PROGRESS NOTES
Notified around 430 pm patient had seizure like activity witnessed by nursing. Per report patient has head back, fists clenched and leg shaking that lasted ~30 seconds. No bowel incontinence or tongue biting. Patient initially confused after event but returned to baseline mentation. Notified 630pm patient had second event the same as the first. Patient given 2 mg Ativan. Placed on seizure precautions, glucose wnl, BMP pending. Ordered EEG and stat CT head. Vital signs stable no neurologic deficits noted by nursing.    Consulted Neurology Dr. Choi,will transfer to Neurology floor. Neuro recommended that Merrem can reduce the seizure threshold and to consider changing abx, however no acceptable alternative due to ESBL Klebsiella in the past, urine culture pending will try to change abx based on culture results. Will continue to monitor.       Dr. Nicol Velasquez DO

## 2020-09-04 NOTE — DISCHARGE PLANNING
Anticipated Discharge Disposition: LTC SNF    Action: Pt was discussed during IDT Rounds, no change. Pt is awaiting SNF LTC bed acceptance.     Barriers to Discharge: SNF acceptance    Plan: F/u with SNF

## 2020-09-05 LAB
GLUCOSE BLD-MCNC: 85 MG/DL (ref 65–99)
GLUCOSE BLD-MCNC: 93 MG/DL (ref 65–99)

## 2020-09-05 PROCEDURE — 700102 HCHG RX REV CODE 250 W/ 637 OVERRIDE(OP): Performed by: INTERNAL MEDICINE

## 2020-09-05 PROCEDURE — A9270 NON-COVERED ITEM OR SERVICE: HCPCS | Performed by: HOSPITALIST

## 2020-09-05 PROCEDURE — 700111 HCHG RX REV CODE 636 W/ 250 OVERRIDE (IP): Performed by: INTERNAL MEDICINE

## 2020-09-05 PROCEDURE — A9270 NON-COVERED ITEM OR SERVICE: HCPCS | Performed by: INTERNAL MEDICINE

## 2020-09-05 PROCEDURE — 770021 HCHG ROOM/CARE - ISO PRIVATE

## 2020-09-05 PROCEDURE — 700102 HCHG RX REV CODE 250 W/ 637 OVERRIDE(OP): Performed by: HOSPITALIST

## 2020-09-05 PROCEDURE — 700111 HCHG RX REV CODE 636 W/ 250 OVERRIDE (IP): Performed by: HOSPITALIST

## 2020-09-05 PROCEDURE — A9270 NON-COVERED ITEM OR SERVICE: HCPCS | Performed by: NURSE PRACTITIONER

## 2020-09-05 PROCEDURE — A9270 NON-COVERED ITEM OR SERVICE: HCPCS | Performed by: FAMILY MEDICINE

## 2020-09-05 PROCEDURE — 700102 HCHG RX REV CODE 250 W/ 637 OVERRIDE(OP): Performed by: FAMILY MEDICINE

## 2020-09-05 PROCEDURE — 99232 SBSQ HOSP IP/OBS MODERATE 35: CPT | Performed by: FAMILY MEDICINE

## 2020-09-05 PROCEDURE — 770020 HCHG ROOM/CARE - TELE (206)

## 2020-09-05 PROCEDURE — 82962 GLUCOSE BLOOD TEST: CPT | Mod: 91

## 2020-09-05 PROCEDURE — 700102 HCHG RX REV CODE 250 W/ 637 OVERRIDE(OP): Performed by: NURSE PRACTITIONER

## 2020-09-05 RX ORDER — METHOCARBAMOL 500 MG/1
500 TABLET, FILM COATED ORAL 4 TIMES DAILY
Status: DISCONTINUED | OUTPATIENT
Start: 2020-09-05 | End: 2020-09-14 | Stop reason: HOSPADM

## 2020-09-05 RX ORDER — OXYCODONE HYDROCHLORIDE 5 MG/1
5 TABLET ORAL EVERY 4 HOURS PRN
Status: DISCONTINUED | OUTPATIENT
Start: 2020-09-05 | End: 2020-09-12

## 2020-09-05 RX ADMIN — TOPIRAMATE 50 MG: 25 TABLET, FILM COATED ORAL at 04:06

## 2020-09-05 RX ADMIN — PREGABALIN 300 MG: 150 CAPSULE ORAL at 04:06

## 2020-09-05 RX ADMIN — GABAPENTIN 300 MG: 300 CAPSULE ORAL at 17:25

## 2020-09-05 RX ADMIN — DOCUSATE SODIUM 50 MG AND SENNOSIDES 8.6 MG 2 TABLET: 8.6; 5 TABLET, FILM COATED ORAL at 04:06

## 2020-09-05 RX ADMIN — IVABRADINE 7.5 MG: 7.5 TABLET, FILM COATED ORAL at 17:38

## 2020-09-05 RX ADMIN — MINERAL OIL, PETROLATUM 1 APPLICATION: 425; 573 OINTMENT OPHTHALMIC at 17:39

## 2020-09-05 RX ADMIN — METHOCARBAMOL TABLETS 500 MG: 500 TABLET, COATED ORAL at 17:26

## 2020-09-05 RX ADMIN — GABAPENTIN 300 MG: 300 CAPSULE ORAL at 04:06

## 2020-09-05 RX ADMIN — SODIUM BICARBONATE 650 MG: 650 TABLET ORAL at 04:05

## 2020-09-05 RX ADMIN — DOCUSATE SODIUM 50 MG AND SENNOSIDES 8.6 MG 2 TABLET: 8.6; 5 TABLET, FILM COATED ORAL at 17:25

## 2020-09-05 RX ADMIN — OXCARBAZEPINE 150 MG: 150 TABLET, FILM COATED ORAL at 04:07

## 2020-09-05 RX ADMIN — OXYBUTYNIN CHLORIDE 5 MG: 5 TABLET ORAL at 04:07

## 2020-09-05 RX ADMIN — LORAZEPAM 0.5 MG: 2 INJECTION INTRAMUSCULAR; INTRAVENOUS at 00:41

## 2020-09-05 RX ADMIN — TOPIRAMATE 50 MG: 25 TABLET, FILM COATED ORAL at 17:38

## 2020-09-05 RX ADMIN — FLUOXETINE 40 MG: 20 CAPSULE ORAL at 04:06

## 2020-09-05 RX ADMIN — MINERAL OIL, PETROLATUM 1 APPLICATION: 425; 573 OINTMENT OPHTHALMIC at 04:06

## 2020-09-05 RX ADMIN — METHOCARBAMOL TABLETS 500 MG: 500 TABLET, COATED ORAL at 20:51

## 2020-09-05 RX ADMIN — OXYCODONE 5 MG: 5 TABLET ORAL at 17:59

## 2020-09-05 RX ADMIN — ACETAZOLAMIDE 1000 MG: 500 CAPSULE, EXTENDED RELEASE ORAL at 17:25

## 2020-09-05 RX ADMIN — ALBUTEROL SULFATE 2 PUFF: 90 AEROSOL, METERED RESPIRATORY (INHALATION) at 05:44

## 2020-09-05 RX ADMIN — OMEPRAZOLE 20 MG: 20 CAPSULE, DELAYED RELEASE ORAL at 04:07

## 2020-09-05 RX ADMIN — DOCUSATE SODIUM 100 MG: 100 CAPSULE, LIQUID FILLED ORAL at 04:07

## 2020-09-05 RX ADMIN — FLUCONAZOLE 200 MG: 100 TABLET ORAL at 04:06

## 2020-09-05 RX ADMIN — ACETAZOLAMIDE 1500 MG: 500 CAPSULE, EXTENDED RELEASE ORAL at 04:06

## 2020-09-05 RX ADMIN — BUSPIRONE HYDROCHLORIDE 10 MG: 10 TABLET ORAL at 20:51

## 2020-09-05 RX ADMIN — GABAPENTIN 300 MG: 300 CAPSULE ORAL at 11:45

## 2020-09-05 RX ADMIN — ASPIRIN 81 MG: 81 TABLET, COATED ORAL at 04:07

## 2020-09-05 RX ADMIN — TRAZODONE HYDROCHLORIDE 100 MG: 100 TABLET ORAL at 20:51

## 2020-09-05 RX ADMIN — MICONAZOLE NITRATE: 20 CREAM TOPICAL at 04:06

## 2020-09-05 RX ADMIN — OXYBUTYNIN CHLORIDE 5 MG: 5 TABLET ORAL at 17:26

## 2020-09-05 RX ADMIN — ZIPRASIDONE HYDROCHLORIDE 40 MG: 40 CAPSULE ORAL at 17:38

## 2020-09-05 RX ADMIN — OXYCODONE HYDROCHLORIDE 10 MG: 5 TABLET ORAL at 00:11

## 2020-09-05 RX ADMIN — DOCUSATE SODIUM 100 MG: 100 CAPSULE, LIQUID FILLED ORAL at 17:26

## 2020-09-05 RX ADMIN — LEVOTHYROXINE SODIUM 75 MCG: 0.07 TABLET ORAL at 04:06

## 2020-09-05 RX ADMIN — MYCOPHENOLATE MOFETIL 1000 MG: 250 CAPSULE ORAL at 04:05

## 2020-09-05 RX ADMIN — PREGABALIN 300 MG: 150 CAPSULE ORAL at 17:26

## 2020-09-05 RX ADMIN — OXYCODONE HYDROCHLORIDE 10 MG: 5 TABLET ORAL at 04:13

## 2020-09-05 RX ADMIN — LORAZEPAM 0.5 MG: 2 INJECTION INTRAMUSCULAR; INTRAVENOUS at 05:49

## 2020-09-05 RX ADMIN — MINERAL OIL, PETROLATUM 1 APPLICATION: 425; 573 OINTMENT OPHTHALMIC at 13:56

## 2020-09-05 RX ADMIN — MICONAZOLE NITRATE: 20 CREAM TOPICAL at 17:39

## 2020-09-05 RX ADMIN — SODIUM BICARBONATE 650 MG: 650 TABLET ORAL at 20:51

## 2020-09-05 RX ADMIN — METHOCARBAMOL TABLETS 750 MG: 750 TABLET, COATED ORAL at 13:55

## 2020-09-05 RX ADMIN — OXYBUTYNIN CHLORIDE 5 MG: 5 TABLET ORAL at 11:45

## 2020-09-05 RX ADMIN — PREDNISONE 10 MG: 10 TABLET ORAL at 04:06

## 2020-09-05 ASSESSMENT — ENCOUNTER SYMPTOMS
MYALGIAS: 1
DEPRESSION: 0
DIARRHEA: 0
HEADACHES: 0
COUGH: 0
ABDOMINAL PAIN: 1
DOUBLE VISION: 0
VOMITING: 0
DIZZINESS: 0
BLURRED VISION: 1
PALPITATIONS: 0
FOCAL WEAKNESS: 1
HEMOPTYSIS: 0

## 2020-09-05 ASSESSMENT — PAIN DESCRIPTION - PAIN TYPE: TYPE: CHRONIC PAIN

## 2020-09-05 NOTE — CARE PLAN
Problem: Respiratory:  Goal: Respiratory status will improve  Outcome: PROGRESSING AS EXPECTED  Patient is on 2L NC. O2 sats are adequate.      Problem: Urinary Elimination:  Goal: Ability to reestablish a normal urinary elimination pattern will improve  Outcome: PROGRESSING AS EXPECTED  Suprapubic catheter in place. Adequate urine output.

## 2020-09-05 NOTE — PROGRESS NOTES
Answered pt call light, pt had sister Valeria on the phone with questions about discharge planning. Pt and sister have concerns about pt's safety and that being at grandma's house won't be safe for pt. Valeria states concerns over pt's medical management ie medicine and side effects, complications, dosages, etc. Told pt and family member that per discharge planning notes, pt is waiting acceptance at longterm SNF. Pt verbalizes understanding. No other questions.

## 2020-09-05 NOTE — PROGRESS NOTES
[facility] Medicine Daily Progress Note    Date of Service  9/5/2020     Chief Complaint  29 y.o. female admitted 8/15/2020 with blurred vision.    Hospital Course    PMH transverse myelitis, asthma, chronic relapsing inflammatory optic neuropathy, suprapubic cath, DM who presented with HA and blurred vision. CTA head and neck and TTE were unremarkable. Dr. Yousif was consulted, recommended conservative medication management for her transient monocular visual loss. She also was found with UTI ESBL klebsiella. She is unable to return home, grandmother unable to care for her. Case management has been closely involved.       Interval Problem Update  Last night patient had episode of shortness of breath, chest x-ray showed no acute process, patient was given a breathing treatment without improvement, Ativan was given which resolved her dyspnea.  Patient has had no repeat dyspnea since.  UA yesterday did show numerous WBCs and positive leukocyte esterase will restart Merrem as patient has history of ESBL, cultures pending. Also showed budding yeast, which were not present on previous UA, will start antifungals.   9/4: Laying in bed.  Lethargic.  Easily aroused to stimulus but just quickly back to sleep.  No acute distress noted.  No seizure activity has been noted by staff overnight.  Afebrile.  Hemodynamically stable.  Pending EEG ordered yesterday for presumed seizure activity.  Suprapubic Andrade catheter has been replaced yesterday by IR.  No issues overnight per staff.  9/5: Lethargic.  Awakens to verbal stimulus quickly drifts back to sleep.  Inquiring about palliative team and when they will come and speak with her.  No acute distress noted.  Has been afebrile.  No issues overnight per staff.  Consultants/Specialty  Ophtho Dr. Soliz  Neuro-ophtho Dr. Yousif  Neurology  Code Status  DNAR/DNI    Disposition  grandmother unable to care for her  Longterm SNF pending      I certify that the patient requires continued  medically necessary services for the treatment of recurrent urinary tract infections in the presence of a suprapubic catheter and will remain in the hospital for 20 days.  Discharge plan is anticipated to be to skilled nursing facility.    Review of Systems  Review of Systems   Constitutional: Positive for malaise/fatigue.   HENT: Negative for hearing loss.    Eyes: Positive for blurred vision (mild). Negative for double vision.   Respiratory: Negative for cough and hemoptysis.    Cardiovascular: Negative for chest pain and palpitations.   Gastrointestinal: Positive for abdominal pain (around cath site). Negative for diarrhea and vomiting.   Genitourinary: Negative for dysuria and urgency.   Musculoskeletal: Positive for myalgias.        Pain b/l feet    Skin: Negative for rash.   Neurological: Positive for focal weakness (chronic). Negative for dizziness and headaches.   Psychiatric/Behavioral: Negative for depression.        Physical Exam  Temp:  [36.1 °C (97 °F)-36.8 °C (98.2 °F)] 36.8 °C (98.2 °F)  Pulse:  [56-76] 71  Resp:  [14-20] 14  BP: ()/(54-86) 92/54  SpO2:  [95 %-98 %] 95 %    Physical Exam  Constitutional:       Appearance: She is obese. She is not toxic-appearing or diaphoretic.      Comments: Appears fatigued   HENT:      Head: Normocephalic and atraumatic.      Mouth/Throat:      Mouth: Mucous membranes are moist.   Eyes:      General:         Right eye: No discharge.         Left eye: No discharge.   Neck:      Musculoskeletal: Neck supple.   Cardiovascular:      Rate and Rhythm: Normal rate and regular rhythm.      Pulses: Normal pulses.   Pulmonary:      Breath sounds: No wheezing, rhonchi or rales.      Comments: Diminished througout  Abdominal:      General: There is no distension.      Tenderness: Tenderness: suprapubic near cath site.      Comments: suprapubic cath in place, no drainage seen, mildly erythematous around tube   Musculoskeletal:         General: Tenderness (b/l feet and  ankles ) present.      Right lower leg: No edema.      Left lower leg: No edema.      Comments: Nonpitting edema to legs   Skin:     General: Skin is warm and dry.   Neurological:      Mental Status: She is oriented to person, place, and time. She is lethargic.      Motor: Weakness present.   Psychiatric:         Mood and Affect: Mood is depressed.         Fluids    Intake/Output Summary (Last 24 hours) at 9/5/2020 1432  Last data filed at 9/5/2020 0546  Gross per 24 hour   Intake --   Output 1400 ml   Net -1400 ml       Laboratory  Recent Labs     09/04/20  0736   WBC 7.0   RBC 4.28   HEMOGLOBIN 12.4   HEMATOCRIT 40.1   MCV 93.7   MCH 29.0   MCHC 30.9*   RDW 45.6   PLATELETCT 150*   MPV 12.4     Recent Labs     09/03/20  0011 09/03/20  1711 09/04/20  0736   SODIUM 141 143 142   POTASSIUM 3.2* 3.9 4.1   CHLORIDE 113* 115* 116*   CO2 17* 15* 14*   GLUCOSE 88 96 118*   BUN 11 11 12   CREATININE 0.67 0.65 0.74   CALCIUM 8.6 8.9 8.8                   Imaging  CT-HEAD W/O   Final Result      No evidence of acute intracranial process.      IR-DRAINAGE-CATH EXCHANGE   Final Result      Successful image guided suprapubic tube replacement with upsizing.                  DX-CHEST-LIMITED (1 VIEW)   Final Result      Hypoinflation without other evidence for acute cardiopulmonary disease.      IR-US GUIDED PIV   Final Result    Ultrasound-guided PERIPHERAL IV INSERTION performed by    qualified nursing staff as above.      CT-CTA CHEST PULMONARY ARTERY W/ RECONS   Final Result      No CT evidence of acute pulmonary thromboembolism      Mild splenomegaly      New small pleural effusions            US-BLADDER   Final Result         1.  Right ureteral jet is not definitively identified, otherwise unremarkable exam.      DX-SHOULDER 2+ LEFT   Final Result      1.  Normal left shoulder series.      DX-CHEST-PORTABLE (1 VIEW)   Final Result         1.  No acute cardiopulmonary disease.      US-BLADDER   Final Result      Suprapubic  catheter balloon is inflated, presumably within the collapsed bladder. If more definitive analysis is required, consider clamping the bladder catheter for  hours prior to repeat ultrasound, allowing the bladder to fill with fluid and therefore    better see the bladder walls      CT-HEAD W/O   Final Result         1.  No acute intracranial abnormality.      DX-WRIST-COMPLETE 3+ RIGHT   Final Result      No acute fracture.  If pain persists, recommend repeat imaging in 7 to 10 days.      EC-ECHOCARDIOGRAM COMPLETE W/ CONT   Final Result      CT-CTA NECK WITH & W/O-POST PROCESSING   Final Result      No high-grade stenosis, large vessel occlusion, aneurysm or dissection.      CT-CTA HEAD WITH & W/O-POST PROCESS   Final Result      No intracranial aneurysm, focal stenosis, or abrupt large vessel cut off.      US-CAROTID DOPPLER BILAT   Final Result      CT-HEAD W/O   Final Result      No CT evidence of acute infarct, hemorrhage or mass.           Assessment/Plan  * Bacteriuria- (present on admission)  Assessment & Plan  Likely colonized urinary tract given history of indwelling suprapubic catheter  ESBL klebsiella--> IV meropenem x 7 days completed on 8/26  UA 9/2/2020 shows budding yeast, and new UTI  9/4: Repeated UA was done prior of Andrade catheter exchange which likely will be positive due to colonization.  There was budding yeast on UA which also considered as colonization.  However, patient was started on Merrem again which will be discontinued.  Will only complete 5 days course of Diflucan since repeat urine culture was not obtained.    Optic neuritis- (present on admission)  Assessment & Plan  Continue outpatient prednisone   ophthalmology consulted, Dr. Aparicio.. signed off  continue home Cellcept   cta head and neck--WNL  Echo.  Within normal limits  hypercoag w./u.. Unremarkable except for a mild elevation of homocystine  No candidate for MRI due to bladder stimulator             Diabetes mellitus type 2 in  obese (HCC)- (present on admission)  Assessment & Plan  Lantus decreased to 8U qhs  Follow-up A1c is 5  Glucose well controlled  ISS    Presence of suprapubic catheter (HCC)- (present on admission)  Assessment & Plan  Was placed 8/6/2020  Intermittent hematuria. Bladder scans have been umremarkable. Hematuria improved after stopping lovenox dvt ppx.  8/26 - spoke with Dr. Galicia, would wait 1 month to exchange cath, ok to change with IR after 1 more week, recommends upsizing  Placed IR order for suprapubic catheter exchange per urology recommendations  9/4: Suprapubic cath exchanged by IR on 9/3.     Morbidly obese (HCC)- (present on admission)  Assessment & Plan  BMI 42    Seizure (HCC)- (present on admission)  Assessment & Plan  9/4: Noted to have 2 seizure activities on the floor by nursing staff followed by pos ictal state.  CT scan of the head was ordered which was negative for any acute intracranial findings.  EEG has been ordered as well.  Neurology consulted.  PRN Ativan for breakthrough seizures.  9/5: EEG was unremarkable.  Neurology not recommending any AED.    Wrist injury, right, initial encounter  Assessment & Plan  Xray negative for fracture    Pain control    Dry eyes- (present on admission)  Assessment & Plan  Eye lubrication     Pain in both feet- (present on admission)  Assessment & Plan  Pain control with po oxycodone        Idiopathic intracranial hypertension- (present on admission)  Assessment & Plan  History of  She is followed by Dr. Yousif, neuro-ophthalmology   continue home Diamox  Supportive care as suggested by neuro-ophthalmology    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HCC)- (present on admission)  Assessment & Plan  Stable at baseline nonambulatory state,    Blurred vision  Assessment & Plan  Presumed retinopathy  monitor    Mild intermittent asthma without complication- (present on admission)  Assessment & Plan  Intermittent flares, affected by smoke  RT protocol, albuterol PRN  CXR no acute  process  Likely intermittent dyspnea has anxiety component, resolved with ativan      History of atrial fibrillation and cardiomyopathy?- (present on admission)  Assessment & Plan  No events on telemetry, will stop    Schizophrenia (HCC)- (present on admission)  Assessment & Plan  Reported history of   Continue Geodon and Prozac    Peripheral neuropathy and chornic pain syndrome (CMS-HCC)- (present on admission)  Assessment & Plan  At risk of morbid obesity hypoventilation, avoid excessive medications limiting respiratory drive.       VTE prophylaxis: scds, hematuria

## 2020-09-05 NOTE — PROGRESS NOTES
Monitor summary: SB/SR with rare PVCs and PACs, VT 0.16, QRS 0.08, QT 0.40, HR 50s-070s per strip from monitor room.

## 2020-09-05 NOTE — CONSULTS
Neurology Consultation        Referring Physician:  Dr. Nicol Velasquez    Referral Reason:  Possible seizures    HPI:  Kristin Balderrama is a 29 y.o. female with history of CRION, sleep apnea, obesity, borderline personality disorder, diabetes, falls, headaches currently on Diamox, presenting to the hospital for fall and consulted for lower extremity numbness and weakness.  The patient has had multiple admissions to the hospital, as well as multiple neurology consults without clear etiology of multiple neurologic symptoms including tetraparesis, numbness and sensory symptoms of fluctuate in location and severity.  He has been hospitalized this time since August 15 for blurry vision which was treated conservatively and then UTI associated with chronic suprapubic catheter.  She has been on meropenem for the last 7 days.  Last night she had an episode where her eyes rolled back in her fist clenched and she trembled concerning for seizure.  She recovered to her baseline and then had a second episode after which she was given Ativan.  Today she reports just feeling very tired and she kind of aches all over.  She denies any previous history of seizures.    Past Medical History:  Active Ambulatory Problems     Diagnosis Date Noted   • Borderline personality disorder in adult ([facility]) 09/18/2010   • Knee pain, right 02/13/2014   • Anxiety 12/16/2014   • Fatty liver disease, nonalcoholic 01/19/2015   • H/O prior ablation treatment 02/10/2016   • Peripheral neuropathy and chornic pain syndrome ([facility]) 03/06/2016   • Morbidly obese ([facility]) 03/07/2016   • Scoliosis 03/07/2016   • GERD (gastroesophageal reflux disease) 03/07/2016   • Bilateral heel pain secondary to calcaneal bone spurs 03/28/2016   • Polypharmacy 06/27/2016   • Galactorrhea 07/22/2016   • Schizophrenia ([facility]) 10/27/2016   • Bowel and bladder incontinence 10/27/2016   • Depression 10/28/2016   • Presence of suprapubic catheter ([facility]) 02/16/2017   • Diabetes  mellitus type 2 in obese ([facility]) 04/13/2017   • Bacteriuria 05/13/2017   • Hypothyroidism 08/04/2017   • Functional diarrhea 01/05/2018   • SVT (supraventricular tachycardia) ([facility]) 04/10/2019   • Optic neuritis 05/27/2019   • Generalized weakness 09/30/2019   • Immunocompromised ([facility]) 11/06/2019   • History of atrial fibrillation and cardiomyopathy? 11/06/2019   • Hypocalcemia 11/30/2019   • Mild intermittent asthma without complication 12/16/2019   • History of optic neuritis 12/17/2019   • Stress incontinence 12/27/2019   • Mixed stress and urge urinary incontinence 12/27/2019   • Increased frequency of urination 12/27/2019   • Intracranial hypertension 02/27/2020   • Schizoaffective disorder, depressive type ([facility]) 03/02/2020   • PTSD (post-traumatic stress disorder) 03/02/2020   • History of supraventricular tachycardia 04/20/2020   • Type 2 diabetes mellitus without complication, with long-term current use of insulin ([facility]) 05/11/2020   • Other fatigue 06/29/2020   • Blurred vision 08/15/2020     Resolved Ambulatory Problems     Diagnosis Date Noted   • Urinary retention 09/24/2009   • Tinea pedis 10/22/2009   • Ingrown toenail 10/22/2009   • Psychiatric disorder 10/22/2009   • Constipation 05/19/2010   • Compression fracture of spine ([facility]) 09/18/2010   • Nausea with vomiting 09/18/2010   • Recurrent UTI 09/18/2010   • Headache 09/18/2010   • Abdominal pain 09/18/2010   • Lower abdominal pain 04/02/2011   • Chest wall pain 04/02/2011   • Neurogenic bladder 04/02/2011   • Urinary retention 04/02/2011   • 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[facility]) 04/02/2011   • Acute UTI 04/20/2012   • SOB (shortness of breath) 04/20/2012   • Tachycardia 04/20/2012   • Radiculopathy of lumbar region 04/20/2012   • Degeneration of lumbar or lumbosacral intervertebral disc 08/21/2012   • Sinus tachycardia 10/31/2013   • Chest pain at rest 10/31/2013   • Snoring 12/11/2013   • Sleep disturbance 12/11/2013   • Elevated blood sugar 09/05/2014    • Sacroiliitis, not elsewhere classified ([facility]) 04/21/2015   • Hypothyroidism due to acquired atrophy of thyroid 06/29/2015   • Chronic back pain 06/29/2015   • Bowel and bladder incontinence 06/29/2015   • Lower urinary tract infectious disease 07/05/2015   • MEDICAL HOME 07/13/2015   • Atypical chest pain 08/06/2015   • Enterococcus UTI 09/24/2015   • Palpitations 11/04/2015   • Weakness 11/22/2015   • PCOS (polycystic ovarian syndrome) 11/23/2015   • TONYA on CPAP 03/07/2016   • Lactic acidosis 03/28/2016   • Sepsis ([facility]) 03/28/2016   • Septic shock ([facility]) 03/29/2016   • Lactic acidosis 03/29/2016   • Sepsis ([facility]) 03/29/2016   • Potential for deficient knowledge 04/08/2016   • Encounter for postoperative wound care 04/08/2016   • Non-adherence to medical treatment 04/08/2016   • Problem with transportation 04/13/2016   • Lactic acidosis 04/19/2016   • Elevated lactic acid level 04/21/2016   • History of partial adherence to treatment 04/21/2016   • Joint pain 05/18/2016   • Coordination of complex care 05/19/2016   • Vitamin D deficiency 05/21/2016   • Obesity 05/31/2016   • Right flank pain 06/06/2016   • Weakness of both lower extremities 06/22/2016   • Elevated sedimentation rate 06/27/2016   • Bilateral hip joint arthritis 06/27/2016   • Vision changes 06/27/2016   • Arthritis 06/27/2016   • Alteration in bowel elimination: incontinence 07/13/2016   • Fluctuating blood pressure 07/22/2016   • Shortness of breath 10/03/2016   • Paralysis ([facility]) 10/27/2016   • Chronic pain syndrome 10/27/2016   • Suprapubic catheter ([facility]) 10/27/2016   • Dysphagia 10/27/2016   • Hypovitaminosis D 11/29/2016   • Leg weakness 01/04/2017   • Weakness 01/22/2017   • Paraparesis of both lower limbs ([facility]) 01/24/2017   • Leg weakness, bilateral 02/22/2017   • Weakness of right upper extremity 02/23/2017   • UTI (urinary tract infection) 03/09/2017   • Sepsis ([facility]) 03/09/2017   • Septic shock ([facility]) 03/16/2017   • Chest pain 03/30/2017   •  Tetraplegia ([facility]) 04/13/2017   • Elevated liver function tests 04/13/2017   • On home oxygen therapy 04/15/2017   • DM (diabetes mellitus) ([facility]) 04/26/2017   • Dysuria 05/09/2017   • Weakness 05/13/2017   • Enterococcus faecalis infection 05/13/2017   • Fall at home 05/13/2017   • Rash 06/09/2017   • IGT (impaired glucose tolerance) 07/06/2017   • Accidental overdose 08/04/2017   • Right leg weakness 08/04/2017   • Intractable hemiplegic migraine with status migrainosus 09/07/2017   • Intractable episodic cluster headache 09/14/2017   • Urinary frequency 09/22/2017   • Morbid obesity with BMI of 45.0-49.9, adult ([facility]) 10/24/2017   • Breast wound 11/06/2017   • TONYA (obstructive sleep apnea) 01/09/2018   • Left leg weakness 07/14/2018   • Cellulitis of left breast 08/03/2018   • Transaminitis 08/08/2018   • Acute right eye pain 09/08/2018   • Palpitations 10/01/2018   • Asymptomatic bacteriuria 10/26/2018   • Constipation 05/29/2019   • Weakness 05/29/2019   • Asthma 06/20/2019   • Schizoaffective disorder ([facility]) 06/29/2019   • Moderate persistent asthma with acute exacerbation 08/14/2019   • Left ear pain 08/17/2019   • Uncontrolled type 2 diabetes mellitus with hyperglycemia ([facility]) 08/29/2019   • Acute on chronic respiratory failure with hypoxia ([facility]) 08/30/2019   • Metabolic acidosis, normal anion gap (NAG) likely due to Diamox 08/30/2019   • Stridor 09/25/2019   • Hypokalemia 09/29/2019   • Onychomycosis 11/06/2019   • Closed head injury 11/30/2019   • Altered level of consciousness 12/14/2019   • Mastoiditis 12/16/2019   • Advanced care planning/counseling discussion 12/20/2019   • Seth hematuria 12/27/2019   • Microscopic hematuria 06/13/2019   • Overflow incontinence of urine 12/27/2019   • Urge incontinence of urine 12/27/2019   • Right ear pain 02/24/2020   • Glossodynia 03/13/2020   • Numbness 03/19/2020   • Intertriginous candidiasis 04/20/2020   • Chronic lung disease 04/20/2020   • Restrictive lung disease  "04/20/2020     Past Medical History:   Diagnosis Date   • Abdominal pain    • Anginal syndrome    • Apnea, sleep    • Arrhythmia    • ASTHMA    • Atrial fibrillation ([facility])    • Back pain    • Borderline personality disorder ([facility])    • Breath shortness    • Bronchitis    • Cardiac arrhythmia    • Chickenpox    • Chronic UTI 9/18/2010   • Cough    • Daytime sleepiness    • Diabetes ([facility])    • Diarrhea    • Disorder of thyroid    • Fall    • Fatigue    • Frequent headaches    • Gasping for breath    • Gynecological disorder    • Headache(784.0)    • Heart burn    • History of falling    • Hypertension    • Indigestion    • Migraine    • 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[facility])    • Nausea    • Pain 08-15-12   • Painful joint    • Pneumonia 2012   • Psychosis ([facility])    • Renal disorder    • Ringing in ears    • Sleep apnea    • Tonsillitis    • 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[facility])    • Tuberculosis    • Urinary bladder disorder    • Urinary incontinence    • Wears glasses        Medications:  [unfilled]    Allergies:  Allergies   Allergen Reactions   • Depakote [Divalproex Sodium] Unspecified     Muscle spasms/muscle pain and weakness     • Amitriptyline Unspecified     Headaches     • Aripiprazole [Abilify] Unspecified     Headaches/muscle twitching     • Clindamycin Nausea     Even with food     • Flagyl [Metronidazole Hcl] Unspecified     \"eye problems\"     • Flomax [Tamsulosin Hydrochloride] Swelling   • Levaquin Unspecified     Severe muscle cramps in legs  RXN=unknown   • Metformin Unspecified     Increased lactic acid      • Tape Rash     Tears skin off  coban with Tegaderm tape ok intermittently  RXN=ongoing   • Vancomycin Itching     Pt becomes flushed in face and chest.   RXN=7/10/16   • Wound Dressing Adhesive Hives     By pt report   • Ciprofloxacin    • Hydromorphone Hcl      Pt states she feels loopy   • Keflex Rash     Pt states she gets a rash with this medication  Tolerates ceftriaxone   • Levofloxacin Unspecified     Leg " muscle cramps   • Metronidazole Rash     .   • Penicillins    • Tamsulosin    • Valproic Acid Rash     .       Social History:  Social History     Socioeconomic History   • Marital status: Single     Spouse name: Not on file   • Number of children: Not on file   • Years of education: Not on file   • Highest education level: Not on file   Occupational History   • Not on file   Social Needs   • Financial resource strain: Not on file   • Food insecurity     Worry: Not on file     Inability: Not on file   • Transportation needs     Medical: Not on file     Non-medical: Not on file   Tobacco Use   • Smoking status: Never Smoker   • Smokeless tobacco: Never Used   Substance and Sexual Activity   • Alcohol use: No     Alcohol/week: 0.0 oz   • Drug use: Not Currently     Frequency: 7.0 times per week     Types: Marijuana   • Sexual activity: Not Currently     Birth control/protection: Pill   Lifestyle   • Physical activity     Days per week: Not on file     Minutes per session: Not on file   • Stress: Not on file   Relationships   • Social connections     Talks on phone: Not on file     Gets together: Not on file     Attends Gnosticism service: Not on file     Active member of club or organization: Not on file     Attends meetings of clubs or organizations: Not on file     Relationship status: Not on file   • Intimate partner violence     Fear of current or ex partner: Not on file     Emotionally abused: Not on file     Physically abused: Not on file     Forced sexual activity: Not on file   Other Topics Concern   • Not on file   Social History Narrative    ** Merged History Encounter **            Family History:  Family History   Problem Relation Age of Onset   • Hypertension Mother    • Sleep Apnea Mother    • Heart Disease Mother    • Other Mother         hypothryod   • Hypertension Maternal Uncle    • Heart Disease Maternal Grandmother    • Hypertension Maternal Grandmother    • No Known Problems Sister    • Other  Sister         Narcolepsy;fibromyalsia;bone;nerve   • Genitourinary () Problems Sister         endometriosis       ROS:  All others negative except for what was mentioned in the HPI.    Physical Exam:  Vitals:   Vitals:    09/03/20 2000 09/04/20 0043 09/04/20 0400 09/04/20 0754   BP: 121/63 100/68 121/65 104/56   Pulse: 65 60 61 74   Resp: 17 17 17 20   Temp: 36.1 °C (97 °F) 36.1 °C (97 °F) 36.1 °C (97 °F) 36.2 °C (97.2 °F)   TempSrc: Temporal Temporal Temporal Temporal   SpO2: 96% 97% 97% 98%   Weight:       Height:         General: She is lying in a hospital bed with her eyes closed, mostly speaking with her eyes closed  Mental status: Alert, oriented x3, speech is fluent  Cranial Nerves: Pupils look equal and reactive, extraocular movements are intact, she can count fingers out of both eyes.  Face is symmetric.  Motor: She moves all 4 limbs but gives very poor effort.  Sensory: Intact to light touch throughout  Reflexes: Trace and symmetric in the biceps and patellas downgoing toes bilaterally  Coordination: Patient gave poor effort trying to touch finger-to-nose.  Gait:  Deferred        Impression: Mrs. is a 29-year-old woman who presents with 2 seizure-like episodes while on meropenem for a UTI.  Certainly possible the meropenem triggered seizures.  Her past brain imaging has been largely normal so she should not be at high risk for seizure disorder.  Nonepileptic seizures in the differential given her extensive psychiatric history as well.      Recommendations:  -I would stop meropenem or switch to another antibiotic  -Monitor with seizure precautions  -EEG is normal, I would not start antiseizure medication in this patient with presumed provoked seizure.  She is already on multiple antiepileptics for neuropathic pain and headache control.  Although they are at relatively low dose.  -No further recommendations at this time, neurology will sign off.  Please call if any further seizure activity occurs.

## 2020-09-05 NOTE — CARE PLAN
Problem: Urinary Elimination:  Goal: Ability to reestablish a normal urinary elimination pattern will improve  Outcome: PROGRESSING AS EXPECTED  Note: Suprapubic catheter patent, draining.     Problem: Mobility  Goal: Risk for activity intolerance will decrease  Outcome: PROGRESSING SLOWER THAN EXPECTED  Note: Pt is nonambulatory, too lethargic to participate in activities today.

## 2020-09-05 NOTE — PROGRESS NOTES
"Assumed care of pt at 0700. Pt is asleep at this time. Woken up for assessment/breakfast. Pt is lethargic at this time, unable to maintain eyes open for full assessment. Offered breakfast, pt states, \"I'll try.\" Breakfast set up in front of pt but too sleepy to eat at this time. Morning meds held for unsafe swallow at this time. Fall precautions in place, call light within reach. Hourly rounding in progress.  "

## 2020-09-06 LAB — GLUCOSE BLD-MCNC: 121 MG/DL (ref 65–99)

## 2020-09-06 PROCEDURE — 700102 HCHG RX REV CODE 250 W/ 637 OVERRIDE(OP): Performed by: INTERNAL MEDICINE

## 2020-09-06 PROCEDURE — 700111 HCHG RX REV CODE 636 W/ 250 OVERRIDE (IP): Performed by: HOSPITALIST

## 2020-09-06 PROCEDURE — 700102 HCHG RX REV CODE 250 W/ 637 OVERRIDE(OP): Performed by: HOSPITALIST

## 2020-09-06 PROCEDURE — 700111 HCHG RX REV CODE 636 W/ 250 OVERRIDE (IP): Performed by: INTERNAL MEDICINE

## 2020-09-06 PROCEDURE — A9270 NON-COVERED ITEM OR SERVICE: HCPCS | Performed by: FAMILY MEDICINE

## 2020-09-06 PROCEDURE — 700102 HCHG RX REV CODE 250 W/ 637 OVERRIDE(OP): Performed by: FAMILY MEDICINE

## 2020-09-06 PROCEDURE — 99232 SBSQ HOSP IP/OBS MODERATE 35: CPT | Performed by: FAMILY MEDICINE

## 2020-09-06 PROCEDURE — A9270 NON-COVERED ITEM OR SERVICE: HCPCS | Performed by: INTERNAL MEDICINE

## 2020-09-06 PROCEDURE — A9270 NON-COVERED ITEM OR SERVICE: HCPCS | Performed by: HOSPITALIST

## 2020-09-06 PROCEDURE — 82962 GLUCOSE BLOOD TEST: CPT

## 2020-09-06 PROCEDURE — 770020 HCHG ROOM/CARE - TELE (206)

## 2020-09-06 PROCEDURE — 770021 HCHG ROOM/CARE - ISO PRIVATE

## 2020-09-06 RX ADMIN — OMEPRAZOLE 20 MG: 20 CAPSULE, DELAYED RELEASE ORAL at 05:13

## 2020-09-06 RX ADMIN — OXCARBAZEPINE 150 MG: 150 TABLET, FILM COATED ORAL at 18:22

## 2020-09-06 RX ADMIN — PREGABALIN 300 MG: 150 CAPSULE ORAL at 05:13

## 2020-09-06 RX ADMIN — PREDNISONE 10 MG: 10 TABLET ORAL at 05:13

## 2020-09-06 RX ADMIN — OXYBUTYNIN CHLORIDE 5 MG: 5 TABLET ORAL at 18:20

## 2020-09-06 RX ADMIN — METHOCARBAMOL TABLETS 500 MG: 500 TABLET, COATED ORAL at 12:36

## 2020-09-06 RX ADMIN — LORAZEPAM 2 MG: 2 INJECTION INTRAMUSCULAR; INTRAVENOUS at 10:17

## 2020-09-06 RX ADMIN — SODIUM BICARBONATE 650 MG: 650 TABLET ORAL at 20:56

## 2020-09-06 RX ADMIN — INSULIN GLARGINE 8 UNITS: 100 INJECTION, SOLUTION SUBCUTANEOUS at 18:28

## 2020-09-06 RX ADMIN — BUSPIRONE HYDROCHLORIDE 10 MG: 10 TABLET ORAL at 20:54

## 2020-09-06 RX ADMIN — LEVOTHYROXINE SODIUM 75 MCG: 0.07 TABLET ORAL at 04:57

## 2020-09-06 RX ADMIN — MYCOPHENOLATE MOFETIL 1000 MG: 250 CAPSULE ORAL at 04:58

## 2020-09-06 RX ADMIN — GABAPENTIN 300 MG: 300 CAPSULE ORAL at 12:36

## 2020-09-06 RX ADMIN — OXYCODONE 5 MG: 5 TABLET ORAL at 08:13

## 2020-09-06 RX ADMIN — PREGABALIN 300 MG: 150 CAPSULE ORAL at 18:21

## 2020-09-06 RX ADMIN — IVABRADINE 7.5 MG: 7.5 TABLET, FILM COATED ORAL at 18:22

## 2020-09-06 RX ADMIN — MINERAL OIL, PETROLATUM 1 APPLICATION: 425; 573 OINTMENT OPHTHALMIC at 06:00

## 2020-09-06 RX ADMIN — MYCOPHENOLATE MOFETIL 1000 MG: 250 CAPSULE ORAL at 18:20

## 2020-09-06 RX ADMIN — ZIPRASIDONE HYDROCHLORIDE 40 MG: 40 CAPSULE ORAL at 18:34

## 2020-09-06 RX ADMIN — ASPIRIN 81 MG: 81 TABLET, COATED ORAL at 04:57

## 2020-09-06 RX ADMIN — MINERAL OIL, PETROLATUM 1 APPLICATION: 425; 573 OINTMENT OPHTHALMIC at 18:22

## 2020-09-06 RX ADMIN — MICONAZOLE NITRATE: 20 CREAM TOPICAL at 04:58

## 2020-09-06 RX ADMIN — DOCUSATE SODIUM 100 MG: 100 CAPSULE, LIQUID FILLED ORAL at 04:57

## 2020-09-06 RX ADMIN — IVABRADINE 7.5 MG: 7.5 TABLET, FILM COATED ORAL at 08:16

## 2020-09-06 RX ADMIN — TOPIRAMATE 50 MG: 25 TABLET, FILM COATED ORAL at 18:21

## 2020-09-06 RX ADMIN — BUSPIRONE HYDROCHLORIDE 10 MG: 10 TABLET ORAL at 08:14

## 2020-09-06 RX ADMIN — ACETAZOLAMIDE 1000 MG: 500 CAPSULE, EXTENDED RELEASE ORAL at 18:20

## 2020-09-06 RX ADMIN — OXYBUTYNIN CHLORIDE 5 MG: 5 TABLET ORAL at 12:36

## 2020-09-06 RX ADMIN — FLUOXETINE 40 MG: 20 CAPSULE ORAL at 04:58

## 2020-09-06 RX ADMIN — SODIUM BICARBONATE 650 MG: 650 TABLET ORAL at 04:57

## 2020-09-06 RX ADMIN — MICONAZOLE NITRATE: 20 CREAM TOPICAL at 18:22

## 2020-09-06 RX ADMIN — METHOCARBAMOL TABLETS 500 MG: 500 TABLET, COATED ORAL at 08:13

## 2020-09-06 RX ADMIN — FLUCONAZOLE 200 MG: 100 TABLET ORAL at 04:57

## 2020-09-06 RX ADMIN — TRAZODONE HYDROCHLORIDE 100 MG: 100 TABLET ORAL at 20:54

## 2020-09-06 RX ADMIN — ACETAZOLAMIDE 1500 MG: 500 CAPSULE, EXTENDED RELEASE ORAL at 04:58

## 2020-09-06 RX ADMIN — OXYBUTYNIN CHLORIDE 5 MG: 5 TABLET ORAL at 05:13

## 2020-09-06 RX ADMIN — GABAPENTIN 300 MG: 300 CAPSULE ORAL at 18:20

## 2020-09-06 RX ADMIN — SODIUM BICARBONATE 650 MG: 650 TABLET ORAL at 18:21

## 2020-09-06 RX ADMIN — METHOCARBAMOL TABLETS 500 MG: 500 TABLET, COATED ORAL at 18:21

## 2020-09-06 RX ADMIN — TOPIRAMATE 50 MG: 25 TABLET, FILM COATED ORAL at 05:14

## 2020-09-06 RX ADMIN — SODIUM BICARBONATE 650 MG: 650 TABLET ORAL at 08:13

## 2020-09-06 RX ADMIN — MINERAL OIL, PETROLATUM 1 APPLICATION: 425; 573 OINTMENT OPHTHALMIC at 12:00

## 2020-09-06 RX ADMIN — GABAPENTIN 300 MG: 300 CAPSULE ORAL at 04:57

## 2020-09-06 RX ADMIN — OXCARBAZEPINE 150 MG: 150 TABLET, FILM COATED ORAL at 05:13

## 2020-09-06 RX ADMIN — ZIPRASIDONE HYDROCHLORIDE 40 MG: 40 CAPSULE ORAL at 08:13

## 2020-09-06 RX ADMIN — DOCUSATE SODIUM 50 MG AND SENNOSIDES 8.6 MG 2 TABLET: 8.6; 5 TABLET, FILM COATED ORAL at 05:13

## 2020-09-06 ASSESSMENT — ENCOUNTER SYMPTOMS
DIZZINESS: 0
PALPITATIONS: 0
DIARRHEA: 0
PHOTOPHOBIA: 0
BLURRED VISION: 1
HEADACHES: 0
ABDOMINAL PAIN: 1
TINGLING: 0
COUGH: 0
DOUBLE VISION: 0
DEPRESSION: 0
MYALGIAS: 1
FOCAL WEAKNESS: 1
VOMITING: 0
HEMOPTYSIS: 0

## 2020-09-06 ASSESSMENT — PAIN DESCRIPTION - PAIN TYPE
TYPE: ACUTE PAIN

## 2020-09-06 ASSESSMENT — FIBROSIS 4 INDEX: FIB4 SCORE: 0.31

## 2020-09-06 NOTE — PROGRESS NOTES
Monitor Summary: SR 69 - 88, UT -.16, QRS -.08, QT -.38 with rare PVC per strip from the monitor room.

## 2020-09-06 NOTE — CARE PLAN
Problem: Communication  Goal: The ability to communicate needs accurately and effectively will improve  Outcome: PROGRESSING AS EXPECTED     Problem: Safety  Goal: Will remain free from injury  Outcome: PROGRESSING AS EXPECTED  Goal: Will remain free from falls  Outcome: PROGRESSING AS EXPECTED     Problem: Infection  Goal: Will remain free from infection  Outcome: PROGRESSING AS EXPECTED     Problem: Venous Thromboembolism (VTW)/Deep Vein Thrombosis (DVT) Prevention:  Goal: Patient will participate in Venous Thrombosis (VTE)/Deep Vein Thrombosis (DVT)Prevention Measures  Outcome: PROGRESSING AS EXPECTED     Problem: Bowel/Gastric:  Goal: Normal bowel function is maintained or improved  Outcome: PROGRESSING AS EXPECTED  Goal: Will not experience complications related to bowel motility  Outcome: PROGRESSING AS EXPECTED     Problem: Knowledge Deficit  Goal: Knowledge of disease process/condition, treatment plan, diagnostic tests, and medications will improve  Outcome: PROGRESSING AS EXPECTED  Goal: Knowledge of the prescribed therapeutic regimen will improve  Outcome: PROGRESSING AS EXPECTED     Problem: Discharge Barriers/Planning  Goal: Patient's continuum of care needs will be met  Outcome: PROGRESSING AS EXPECTED     Problem: Fluid Volume:  Goal: Will maintain balanced intake and output  Outcome: PROGRESSING AS EXPECTED     Problem: Respiratory:  Goal: Respiratory status will improve  Outcome: PROGRESSING AS EXPECTED     Problem: Pain Management  Goal: Pain level will decrease to patient's comfort goal  Outcome: PROGRESSING AS EXPECTED     Problem: Skin Integrity  Goal: Risk for impaired skin integrity will decrease  Outcome: PROGRESSING AS EXPECTED     Problem: Urinary Elimination:  Goal: Ability to reestablish a normal urinary elimination pattern will improve  Outcome: PROGRESSING AS EXPECTED     Problem: Psychosocial Needs:  Goal: Level of anxiety will decrease  Outcome: PROGRESSING AS EXPECTED     Problem:  Mobility  Goal: Risk for activity intolerance will decrease  Outcome: PROGRESSING AS EXPECTED

## 2020-09-06 NOTE — CARE PLAN
Problem: Safety  Goal: Will remain free from injury  Outcome: PROGRESSING AS EXPECTED  Note: Seizure precautions in place: 3 bedrails up, bedrails padded, oxygen and suction on and within reach     Problem: Knowledge Deficit  Goal: Knowledge of disease process/condition, treatment plan, diagnostic tests, and medications will improve  Outcome: PROGRESSING AS EXPECTED  Note: Educating pt about current status, plan of care, diagnostics, medications, and treatment regimen, answering pts questions

## 2020-09-06 NOTE — CONSULTS
"PSYCHIATRY CONSULT TEAM NOTE: Consult received for \"possible psychogenic seizures, significant anxiety.\" Per chart review, the patient was seen by the  Psychiatry team in March of this year (2020) during which she was diagnosed with Functional Neurological Disorder (Conversion disorder), Schizoaffective disorder, Generalized Anxiety, and Borderline Personality Disorder. She was treated with medications at that time. Please refer to note dated 3/23/2020 for medication regime.    Informed Hospitalist of the above.     Canceling consult as there is no new consult question. Please feel free to reconsult if a new question arises. Also, please make sure to provide outpatient mental health referrals if the patient does not have them already.     Please note that psychiatric medication management services are unavailable 9/6/2020.    Thank you.          "

## 2020-09-06 NOTE — PROGRESS NOTES
"SEIZURE NOTE:    1013: Pt reporting \"funky feeling\" in head, stating it feels like increased dizziness and reporting headache, reporting aura, stated, \"I feel like how I felt before my other seizure like thing I had the other day.\"    1015: Pt became unresponsive, mild covulsions started in upper and lower extremities. Pt unable to respond verbally, not withdrawing to pain. Pt turned to side,  reporting oxygen saturation greater than 92% for duration of seizure like activity. Seizure lasted for 2 minutes. Ativan administered per MAR. Dr. Sylvester notified and at bedside at end of seizure.    1017: Pt withdrawing to pain, lethargic,  Placed on 1 L nasal cannula for comfort. Vital signs in chart below. Oriented x3, expressive aphasia noted. Denies numbness/tingling.     Seizures for the past 24 hrs:   BP Temp Temp src Pulse Resp SpO2 Weight   09/06/20 1017 109/68 35.9 °C (96.6 °F) Temporal 70 16 95 % --   09/06/20 0818 -- -- -- -- -- -- 120.6 kg (265 lb 14 oz)   09/06/20 0800 101/65 36.4 °C (97.6 °F) Temporal 79 16 100 % --   09/06/20 0400 105/59 36.1 °C (97 °F) Temporal 70 17 96 % --   09/06/20 0000 (!) 91/65 36.1 °C (97 °F) Temporal 63 17 93 % --   09/05/20 2000 (!) 97/52 36.1 °C (97 °F) Temporal 66 17 92 % --   09/05/20 1632 143/84 36.1 °C (96.9 °F) Temporal 76 (!) 21 100 % --   09/05/20 1236 (!) 92/54 36.8 °C (98.2 °F) Temporal 71 14 95 % --       "

## 2020-09-06 NOTE — PROGRESS NOTES
[facility] Medicine Daily Progress Note    Date of Service  9/6/2020     Chief Complaint  29 y.o. female admitted 8/15/2020 with blurred vision.    Hospital Course    PMH transverse myelitis, asthma, chronic relapsing inflammatory optic neuropathy, suprapubic cath, DM who presented with HA and blurred vision. CTA head and neck and TTE were unremarkable. Dr. Yousif was consulted, recommended conservative medication management for her transient monocular visual loss. She also was found with UTI ESBL klebsiella. She is unable to return home, grandmother unable to care for her. Case management has been closely involved.       Interval Problem Update  Last night patient had episode of shortness of breath, chest x-ray showed no acute process, patient was given a breathing treatment without improvement, Ativan was given which resolved her dyspnea.  Patient has had no repeat dyspnea since.  UA yesterday did show numerous WBCs and positive leukocyte esterase will restart Merrem as patient has history of ESBL, cultures pending. Also showed budding yeast, which were not present on previous UA, will start antifungals.   9/4: Laying in bed.  Lethargic.  Easily aroused to stimulus but just quickly back to sleep.  No acute distress noted.  No seizure activity has been noted by staff overnight.  Afebrile.  Hemodynamically stable.  Pending EEG ordered yesterday for presumed seizure activity.  Suprapubic Andrade catheter has been replaced yesterday by IR.  No issues overnight per staff.  9/5: Lethargic.  Awakens to verbal stimulus quickly drifts back to sleep.  Inquiring about palliative team and when they will come and speak with her.  No acute distress noted.  Has been afebrile.  No issues overnight per staff.  9/6: Resting in bed comfortably.  Stated that she feels confused.  Shortly after had a brief episode of what looks like a seizure that was aborted with IV Ativan.  Went into brief postictal phase.  Continues to be lethargic  throughout the day which resulting and poor p.o. intake.  Hemodynamically stable.  Febrile.  No issues overnight per staff.  Consultants/Specialty  Ophtho Dr. Soliz  Neuro-ophtho Dr. Yousif  Neurology  Code Status  DNAR/DNI    Disposition  grandmother unable to care for her  Longterm SNF pending      I certify that the patient requires continued medically necessary services for the treatment of recurrent urinary tract infections in the presence of a suprapubic catheter and will remain in the hospital for 20 days.  Discharge plan is anticipated to be to skilled nursing facility.    Review of Systems  Review of Systems   Constitutional: Positive for malaise/fatigue.   HENT: Negative for hearing loss.    Eyes: Positive for blurred vision (mild). Negative for double vision and photophobia.   Respiratory: Negative for cough and hemoptysis.    Cardiovascular: Negative for chest pain and palpitations.   Gastrointestinal: Positive for abdominal pain (around cath site). Negative for diarrhea and vomiting.   Genitourinary: Negative for dysuria and urgency.   Musculoskeletal: Positive for myalgias.        Pain b/l feet    Skin: Negative for rash.   Neurological: Positive for focal weakness (chronic). Negative for dizziness, tingling and headaches.   Psychiatric/Behavioral: Negative for depression and suicidal ideas.        Physical Exam  Temp:  [35.9 °C (96.6 °F)-36.4 °C (97.6 °F)] 36.1 °C (97 °F)  Pulse:  [61-79] 61  Resp:  [16-17] 16  BP: ()/(52-68) 107/66  SpO2:  [92 %-100 %] 95 %    Physical Exam  Constitutional:       Appearance: She is obese. She is not toxic-appearing or diaphoretic.      Comments: Appears fatigued   HENT:      Head: Normocephalic and atraumatic.      Mouth/Throat:      Mouth: Mucous membranes are moist.   Eyes:      General:         Right eye: No discharge.         Left eye: No discharge.   Neck:      Musculoskeletal: Neck supple.   Cardiovascular:      Rate and Rhythm: Normal rate and  regular rhythm.      Pulses: Normal pulses.   Pulmonary:      Breath sounds: No wheezing, rhonchi or rales.      Comments: Diminished througout  Abdominal:      General: There is no distension.      Tenderness: Tenderness: suprapubic near cath site.      Comments: suprapubic cath in place, no drainage seen, mildly erythematous around tube   Musculoskeletal:         General: Tenderness (b/l feet and ankles ) present.      Right lower leg: No edema.      Left lower leg: No edema.      Comments: Nonpitting edema to legs   Skin:     General: Skin is warm and dry.   Neurological:      Mental Status: She is oriented to person, place, and time. She is lethargic.      Motor: Weakness present.   Psychiatric:         Mood and Affect: Mood is depressed.         Fluids    Intake/Output Summary (Last 24 hours) at 9/6/2020 1635  Last data filed at 9/6/2020 0900  Gross per 24 hour   Intake 725 ml   Output 1000 ml   Net -275 ml       Laboratory  Recent Labs     09/04/20  0736   WBC 7.0   RBC 4.28   HEMOGLOBIN 12.4   HEMATOCRIT 40.1   MCV 93.7   MCH 29.0   MCHC 30.9*   RDW 45.6   PLATELETCT 150*   MPV 12.4     Recent Labs     09/03/20  1711 09/04/20  0736   SODIUM 143 142   POTASSIUM 3.9 4.1   CHLORIDE 115* 116*   CO2 15* 14*   GLUCOSE 96 118*   BUN 11 12   CREATININE 0.65 0.74   CALCIUM 8.9 8.8                   Imaging  CT-HEAD W/O   Final Result      No evidence of acute intracranial process.      IR-DRAINAGE-CATH EXCHANGE   Final Result      Successful image guided suprapubic tube replacement with upsizing.                  DX-CHEST-LIMITED (1 VIEW)   Final Result      Hypoinflation without other evidence for acute cardiopulmonary disease.      IR-US GUIDED PIV   Final Result    Ultrasound-guided PERIPHERAL IV INSERTION performed by    qualified nursing staff as above.      CT-CTA CHEST PULMONARY ARTERY W/ RECONS   Final Result      No CT evidence of acute pulmonary thromboembolism      Mild splenomegaly      New small pleural  effusions            US-BLADDER   Final Result         1.  Right ureteral jet is not definitively identified, otherwise unremarkable exam.      DX-SHOULDER 2+ LEFT   Final Result      1.  Normal left shoulder series.      DX-CHEST-PORTABLE (1 VIEW)   Final Result         1.  No acute cardiopulmonary disease.      US-BLADDER   Final Result      Suprapubic catheter balloon is inflated, presumably within the collapsed bladder. If more definitive analysis is required, consider clamping the bladder catheter for  hours prior to repeat ultrasound, allowing the bladder to fill with fluid and therefore    better see the bladder walls      CT-HEAD W/O   Final Result         1.  No acute intracranial abnormality.      DX-WRIST-COMPLETE 3+ RIGHT   Final Result      No acute fracture.  If pain persists, recommend repeat imaging in 7 to 10 days.      EC-ECHOCARDIOGRAM COMPLETE W/ CONT   Final Result      CT-CTA NECK WITH & W/O-POST PROCESSING   Final Result      No high-grade stenosis, large vessel occlusion, aneurysm or dissection.      CT-CTA HEAD WITH & W/O-POST PROCESS   Final Result      No intracranial aneurysm, focal stenosis, or abrupt large vessel cut off.      US-CAROTID DOPPLER BILAT   Final Result      CT-HEAD W/O   Final Result      No CT evidence of acute infarct, hemorrhage or mass.           Assessment/Plan  * Bacteriuria- (present on admission)  Assessment & Plan  Likely colonized urinary tract given history of indwelling suprapubic catheter  ESBL klebsiella--> IV meropenem x 7 days completed on 8/26  UA 9/2/2020 shows budding yeast, and new UTI  9/4: Repeated UA was done prior of Andrade catheter exchange which likely will be positive due to colonization.  There was budding yeast on UA which also considered as colonization.  However, patient was started on Merrem again which will be discontinued.  Will only complete 5 days course of Diflucan since repeat urine culture was not obtained.    Optic neuritis- (present  on admission)  Assessment & Plan  Continue outpatient prednisone   ophthalmology consulted, Dr. Aparicio.. signed off  continue home Cellcept   cta head and neck--WNL  Echo.  Within normal limits  hypercoag w./u.. Unremarkable except for a mild elevation of homocystine  No candidate for MRI due to bladder stimulator             Diabetes mellitus type 2 in obese (HCC)- (present on admission)  Assessment & Plan  Lantus decreased to 8U qhs  Follow-up A1c is 5  Glucose well controlled  ISS    Presence of suprapubic catheter (HCC)- (present on admission)  Assessment & Plan  Was placed 8/6/2020  Intermittent hematuria. Bladder scans have been umremarkable. Hematuria improved after stopping lovenox dvt ppx.  8/26 - spoke with Dr. Galicia, would wait 1 month to exchange cath, ok to change with IR after 1 more week, recommends upsizing  Placed IR order for suprapubic catheter exchange per urology recommendations  9/4: Suprapubic cath exchanged by IR on 9/3.     Morbidly obese (HCC)- (present on admission)  Assessment & Plan  BMI 42    Seizure (HCC)- (present on admission)  Assessment & Plan  9/4: Noted to have 2 seizure activities on the floor by nursing staff followed by pos ictal state.  CT scan of the head was ordered which was negative for any acute intracranial findings.  EEG has been ordered as well.  Neurology consulted.  PRN Ativan for breakthrough seizures.  9/5: EEG was unremarkable.  Neurology not recommending any AED.  9/6: Had another seizure this morning that was aborted by Ativan.  Could be possibly psychogenic concerning that patient has conversion disorder and other psychological issues.  At this point psychiatry has no further recommendations.  We will continue to monitor and if patient develop another seizure, will consider continuous video EEG.    Wrist injury, right, initial encounter  Assessment & Plan  Xray negative for fracture    Pain control    Dry eyes- (present on admission)  Assessment &  Plan  Eye lubrication     Pain in both feet- (present on admission)  Assessment & Plan  Pain control with po oxycodone        Idiopathic intracranial hypertension- (present on admission)  Assessment & Plan  History of  She is followed by Dr. Yousif, neuro-ophthalmology   continue home Diamox  Supportive care as suggested by neuro-ophthalmology    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[facility])- (present on admission)  Assessment & Plan  Stable at baseline nonambulatory state,    Blurred vision  Assessment & Plan  Presumed retinopathy  monitor    Mild intermittent asthma without complication- (present on admission)  Assessment & Plan  Intermittent flares, affected by smoke  RT protocol, albuterol PRN  CXR no acute process  Likely intermittent dyspnea has anxiety component, resolved with ativan      History of atrial fibrillation and cardiomyopathy?- (present on admission)  Assessment & Plan  No events on telemetry, will stop    Schizophrenia ([facility])- (present on admission)  Assessment & Plan  Reported history of   Continue Geodon and Prozac    Peripheral neuropathy and chornic pain syndrome ([facility])- (present on admission)  Assessment & Plan  At risk of morbid obesity hypoventilation, avoid excessive medications limiting respiratory drive.       VTE prophylaxis: scds, hematuria

## 2020-09-06 NOTE — CONSULTS
"Reason for PC Consult: Advance Care Planning    Consulted by: Dr. Sylvester per pt's request to discuss \"end of life.\"    Assessment:  General: 30 y/o female from home 8/15 with visual disturbances, HA, and eye pain. She has a PMHx of transverse myelitis, schizoaffective, personality disorder, EVELIA, chronic pain, migraines, asthma, increased ICP on Diamox, chronic relapsing inflammatory optic neuropathy, suprapubic cath, and DM. Pt requested to speak with PC about her plan and \"end of life.\" Pt had a presumed seizure this morning but back to baseline per BS team.    Social: Pt was living with her grandmother Vivienne, but she is reportedly unable to care for her any longer. Per PT/OT, she was able to transfer unassisted and able to manage hygiene/grooming ADLs, all of which were at her baseline level of functioning.     Consults: neurophthalmology, ophthalmology, neurology    Dyspnea: No-    Last BM: 09/06/20-    Pain: No-    Depression: Mood appropriate for situation-    Dementia: No;       Spiritual:  Is Scientology or spirituality important for coping with this illness? No-    Has a  or spiritual provider visit been requested? No    Palliative Performance Scale: 60%    Advance Directive: Advance Directive-    DPOA: Yes- Grandmother Vivienne  POLST: Yes- DNR/DNI, selective treatment, TF no longer than a month    Code Status: DNR- DNI    Outcome:  1130: Consult received and EMR reviewed; case discussed with Dr. Sylvester regarding pt's current condition and plan for PC to meet with her. Case reviewed with Dr. Reeder as well given her review of the EMR and patient's situation. Spoke with BS RN prior to seeing patient at .     1445: PC RN met with Kristin at  and explained the role of PC to help with discussions about the current medical situation and goals moving forward.. She was extremely lethargic and arousing minimally when this entered the room and called her name. She attempted to open her eyes and this RN " "introduced self and role of PC. She continued trying to wake up but was having difficulty. PC offered to return at a later time when she was more awake, but she states \"no, I'm here.\" Much of her speech was mumbled and she would trail off such that she could not be heard/understood well. PC took a seat next to her as she tried to wake up more. PC inquired about her request to speak with PC and any specific questions she has for this RN. She inquired about the difference between PC and hospice which was explained. She then asked about her SSI insurance which this RN explained not having information about.      Explored pt's concerns regarding her DC plan. She tells PC that she can't go home because her grandfather Nigel is \"going on hospice.\" She tells this RN her grandma stated \"Very clearly if I can't walk 150 feet without holding the walls, I can go back there.\" She also shares with PC that she hasn't walked \"for some time\" and has been using the wheelchair \"off and on for a long time.\" PC RN inquired about concerns she has with a LTC facility and she only worries that \"I can't lock my door and someone might steal my things.\" PC discussed  as an option being smaller and possibly less risk of her belongings going missing and she states \"I think I like the bigger place with more people.\" PC RN verbalized understanding and inquired about any other questions she has specifically for this RN; she was unable to think of any. PC offered to return if something arose and she said \"Ok.\" She promptly went back to sleep.      Updated: RN/MD    Plan: PC can [place] back if any questions arise; will not be following closely so please call [phone] if something arises    Thank you for allowing Palliative Care to support this patient and family. Contact [phone] for additional assistance, change in patient status, or with any questions/concerns.   "

## 2020-09-07 PROBLEM — R63.8 POOR FLUID INTAKE: Status: ACTIVE | Noted: 2020-09-07

## 2020-09-07 LAB
ALBUMIN SERPL BCP-MCNC: 3.7 G/DL (ref 3.2–4.9)
ALBUMIN/GLOB SERPL: 1.9 G/DL
ALP SERPL-CCNC: 56 U/L (ref 30–99)
ALT SERPL-CCNC: 19 U/L (ref 2–50)
ANION GAP SERPL CALC-SCNC: 14 MMOL/L (ref 7–16)
AST SERPL-CCNC: 7 U/L (ref 12–45)
BASOPHILS # BLD AUTO: 0.5 % (ref 0–1.8)
BASOPHILS # BLD: 0.04 K/UL (ref 0–0.12)
BILIRUB SERPL-MCNC: 0.2 MG/DL (ref 0.1–1.5)
BUN SERPL-MCNC: 12 MG/DL (ref 8–22)
CALCIUM SERPL-MCNC: 8.9 MG/DL (ref 8.5–10.5)
CHLORIDE SERPL-SCNC: 112 MMOL/L (ref 96–112)
CO2 SERPL-SCNC: 15 MMOL/L (ref 20–33)
CREAT SERPL-MCNC: 0.8 MG/DL (ref 0.5–1.4)
EOSINOPHIL # BLD AUTO: 0.29 K/UL (ref 0–0.51)
EOSINOPHIL NFR BLD: 4 % (ref 0–6.9)
ERYTHROCYTE [DISTWIDTH] IN BLOOD BY AUTOMATED COUNT: 44.9 FL (ref 35.9–50)
GLOBULIN SER CALC-MCNC: 1.9 G/DL (ref 1.9–3.5)
GLUCOSE BLD-MCNC: 117 MG/DL (ref 65–99)
GLUCOSE SERPL-MCNC: 129 MG/DL (ref 65–99)
HCT VFR BLD AUTO: 39.6 % (ref 37–47)
HGB BLD-MCNC: 12.7 G/DL (ref 12–16)
IMM GRANULOCYTES # BLD AUTO: 0.05 K/UL (ref 0–0.11)
IMM GRANULOCYTES NFR BLD AUTO: 0.7 % (ref 0–0.9)
LYMPHOCYTES # BLD AUTO: 1.39 K/UL (ref 1–4.8)
LYMPHOCYTES NFR BLD: 19 % (ref 22–41)
MAGNESIUM SERPL-MCNC: 2.1 MG/DL (ref 1.5–2.5)
MCH RBC QN AUTO: 29.3 PG (ref 27–33)
MCHC RBC AUTO-ENTMCNC: 32.1 G/DL (ref 33.6–35)
MCV RBC AUTO: 91.2 FL (ref 81.4–97.8)
MONOCYTES # BLD AUTO: 0.44 K/UL (ref 0–0.85)
MONOCYTES NFR BLD AUTO: 6 % (ref 0–13.4)
NEUTROPHILS # BLD AUTO: 5.11 K/UL (ref 2–7.15)
NEUTROPHILS NFR BLD: 69.8 % (ref 44–72)
NRBC # BLD AUTO: 0 K/UL
NRBC BLD-RTO: 0 /100 WBC
PLATELET # BLD AUTO: 170 K/UL (ref 164–446)
PMV BLD AUTO: 12.4 FL (ref 9–12.9)
POTASSIUM SERPL-SCNC: 3.2 MMOL/L (ref 3.6–5.5)
PROT SERPL-MCNC: 5.6 G/DL (ref 6–8.2)
RBC # BLD AUTO: 4.34 M/UL (ref 4.2–5.4)
SODIUM SERPL-SCNC: 141 MMOL/L (ref 135–145)
WBC # BLD AUTO: 7.3 K/UL (ref 4.8–10.8)

## 2020-09-07 PROCEDURE — 700102 HCHG RX REV CODE 250 W/ 637 OVERRIDE(OP): Performed by: HOSPITALIST

## 2020-09-07 PROCEDURE — 700111 HCHG RX REV CODE 636 W/ 250 OVERRIDE (IP): Performed by: INTERNAL MEDICINE

## 2020-09-07 PROCEDURE — 83735 ASSAY OF MAGNESIUM: CPT

## 2020-09-07 PROCEDURE — 99231 SBSQ HOSP IP/OBS SF/LOW 25: CPT | Performed by: FAMILY MEDICINE

## 2020-09-07 PROCEDURE — A9270 NON-COVERED ITEM OR SERVICE: HCPCS | Performed by: HOSPITALIST

## 2020-09-07 PROCEDURE — 85025 COMPLETE CBC W/AUTO DIFF WBC: CPT

## 2020-09-07 PROCEDURE — 700111 HCHG RX REV CODE 636 W/ 250 OVERRIDE (IP): Performed by: HOSPITALIST

## 2020-09-07 PROCEDURE — 80053 COMPREHEN METABOLIC PANEL: CPT

## 2020-09-07 PROCEDURE — 700102 HCHG RX REV CODE 250 W/ 637 OVERRIDE(OP): Performed by: FAMILY MEDICINE

## 2020-09-07 PROCEDURE — 700105 HCHG RX REV CODE 258: Performed by: FAMILY MEDICINE

## 2020-09-07 PROCEDURE — A9270 NON-COVERED ITEM OR SERVICE: HCPCS | Performed by: FAMILY MEDICINE

## 2020-09-07 PROCEDURE — A9270 NON-COVERED ITEM OR SERVICE: HCPCS | Performed by: INTERNAL MEDICINE

## 2020-09-07 PROCEDURE — 700102 HCHG RX REV CODE 250 W/ 637 OVERRIDE(OP): Performed by: INTERNAL MEDICINE

## 2020-09-07 PROCEDURE — 36415 COLL VENOUS BLD VENIPUNCTURE: CPT

## 2020-09-07 PROCEDURE — 770020 HCHG ROOM/CARE - TELE (206)

## 2020-09-07 PROCEDURE — 82962 GLUCOSE BLOOD TEST: CPT

## 2020-09-07 PROCEDURE — 700111 HCHG RX REV CODE 636 W/ 250 OVERRIDE (IP): Performed by: NURSE PRACTITIONER

## 2020-09-07 RX ORDER — SODIUM CHLORIDE, SODIUM LACTATE, POTASSIUM CHLORIDE, CALCIUM CHLORIDE 600; 310; 30; 20 MG/100ML; MG/100ML; MG/100ML; MG/100ML
INJECTION, SOLUTION INTRAVENOUS CONTINUOUS
Status: ACTIVE | OUTPATIENT
Start: 2020-09-07 | End: 2020-09-08

## 2020-09-07 RX ORDER — POTASSIUM CHLORIDE 20 MEQ/1
40 TABLET, EXTENDED RELEASE ORAL ONCE
Status: COMPLETED | OUTPATIENT
Start: 2020-09-07 | End: 2020-09-07

## 2020-09-07 RX ADMIN — METHOCARBAMOL TABLETS 500 MG: 500 TABLET, COATED ORAL at 17:16

## 2020-09-07 RX ADMIN — ZIPRASIDONE HYDROCHLORIDE 40 MG: 40 CAPSULE ORAL at 17:13

## 2020-09-07 RX ADMIN — POTASSIUM CHLORIDE 40 MEQ: 1500 TABLET, EXTENDED RELEASE ORAL at 12:36

## 2020-09-07 RX ADMIN — ONDANSETRON 4 MG: 4 TABLET, ORALLY DISINTEGRATING ORAL at 12:25

## 2020-09-07 RX ADMIN — IVABRADINE 7.5 MG: 7.5 TABLET, FILM COATED ORAL at 08:31

## 2020-09-07 RX ADMIN — MYCOPHENOLATE MOFETIL 1000 MG: 250 CAPSULE ORAL at 05:37

## 2020-09-07 RX ADMIN — PREGABALIN 300 MG: 150 CAPSULE ORAL at 17:16

## 2020-09-07 RX ADMIN — METHOCARBAMOL TABLETS 500 MG: 500 TABLET, COATED ORAL at 08:30

## 2020-09-07 RX ADMIN — GABAPENTIN 300 MG: 300 CAPSULE ORAL at 17:15

## 2020-09-07 RX ADMIN — MYCOPHENOLATE MOFETIL 1000 MG: 250 CAPSULE ORAL at 21:18

## 2020-09-07 RX ADMIN — OXYBUTYNIN CHLORIDE 5 MG: 5 TABLET ORAL at 12:36

## 2020-09-07 RX ADMIN — FLUOXETINE 40 MG: 20 CAPSULE ORAL at 05:37

## 2020-09-07 RX ADMIN — METHOCARBAMOL TABLETS 500 MG: 500 TABLET, COATED ORAL at 21:18

## 2020-09-07 RX ADMIN — OXYBUTYNIN CHLORIDE 5 MG: 5 TABLET ORAL at 17:13

## 2020-09-07 RX ADMIN — IVABRADINE 7.5 MG: 7.5 TABLET, FILM COATED ORAL at 17:13

## 2020-09-07 RX ADMIN — PREGABALIN 300 MG: 150 CAPSULE ORAL at 05:38

## 2020-09-07 RX ADMIN — OMEPRAZOLE 20 MG: 20 CAPSULE, DELAYED RELEASE ORAL at 05:35

## 2020-09-07 RX ADMIN — INSULIN GLARGINE 8 UNITS: 100 INJECTION, SOLUTION SUBCUTANEOUS at 17:24

## 2020-09-07 RX ADMIN — MINERAL OIL, PETROLATUM 1 APPLICATION: 425; 573 OINTMENT OPHTHALMIC at 17:13

## 2020-09-07 RX ADMIN — ACETAZOLAMIDE 1000 MG: 500 CAPSULE, EXTENDED RELEASE ORAL at 17:13

## 2020-09-07 RX ADMIN — SODIUM BICARBONATE 650 MG: 650 TABLET ORAL at 08:31

## 2020-09-07 RX ADMIN — GABAPENTIN 300 MG: 300 CAPSULE ORAL at 05:36

## 2020-09-07 RX ADMIN — BUSPIRONE HYDROCHLORIDE 10 MG: 10 TABLET ORAL at 08:30

## 2020-09-07 RX ADMIN — OXCARBAZEPINE 150 MG: 150 TABLET, FILM COATED ORAL at 17:13

## 2020-09-07 RX ADMIN — TRAZODONE HYDROCHLORIDE 100 MG: 100 TABLET ORAL at 21:18

## 2020-09-07 RX ADMIN — SODIUM CHLORIDE, POTASSIUM CHLORIDE, SODIUM LACTATE AND CALCIUM CHLORIDE: 600; 310; 30; 20 INJECTION, SOLUTION INTRAVENOUS at 17:29

## 2020-09-07 RX ADMIN — TOPIRAMATE 50 MG: 25 TABLET, FILM COATED ORAL at 17:15

## 2020-09-07 RX ADMIN — TOPIRAMATE 50 MG: 25 TABLET, FILM COATED ORAL at 05:37

## 2020-09-07 RX ADMIN — SODIUM BICARBONATE 650 MG: 650 TABLET ORAL at 05:38

## 2020-09-07 RX ADMIN — ASPIRIN 81 MG: 81 TABLET, COATED ORAL at 05:38

## 2020-09-07 RX ADMIN — FLUCONAZOLE 200 MG: 100 TABLET ORAL at 05:35

## 2020-09-07 RX ADMIN — PREDNISONE 10 MG: 10 TABLET ORAL at 05:39

## 2020-09-07 RX ADMIN — ZIPRASIDONE HYDROCHLORIDE 40 MG: 40 CAPSULE ORAL at 08:31

## 2020-09-07 RX ADMIN — OXCARBAZEPINE 150 MG: 150 TABLET, FILM COATED ORAL at 05:38

## 2020-09-07 RX ADMIN — SODIUM CHLORIDE, POTASSIUM CHLORIDE, SODIUM LACTATE AND CALCIUM CHLORIDE: 600; 310; 30; 20 INJECTION, SOLUTION INTRAVENOUS at 10:02

## 2020-09-07 RX ADMIN — MINERAL OIL, PETROLATUM 1 APPLICATION: 425; 573 OINTMENT OPHTHALMIC at 05:39

## 2020-09-07 RX ADMIN — GABAPENTIN 300 MG: 300 CAPSULE ORAL at 12:36

## 2020-09-07 RX ADMIN — OXYBUTYNIN CHLORIDE 5 MG: 5 TABLET ORAL at 05:38

## 2020-09-07 RX ADMIN — MINERAL OIL, PETROLATUM 1 APPLICATION: 425; 573 OINTMENT OPHTHALMIC at 12:37

## 2020-09-07 RX ADMIN — ACETAZOLAMIDE 1500 MG: 500 CAPSULE, EXTENDED RELEASE ORAL at 05:36

## 2020-09-07 RX ADMIN — SODIUM BICARBONATE 650 MG: 650 TABLET ORAL at 21:18

## 2020-09-07 RX ADMIN — LEVOTHYROXINE SODIUM 75 MCG: 0.07 TABLET ORAL at 05:38

## 2020-09-07 RX ADMIN — BUSPIRONE HYDROCHLORIDE 10 MG: 10 TABLET ORAL at 21:18

## 2020-09-07 RX ADMIN — METHOCARBAMOL TABLETS 500 MG: 500 TABLET, COATED ORAL at 12:36

## 2020-09-07 RX ADMIN — SODIUM BICARBONATE 650 MG: 650 TABLET ORAL at 14:25

## 2020-09-07 RX ADMIN — MICONAZOLE NITRATE: 20 CREAM TOPICAL at 17:13

## 2020-09-07 ASSESSMENT — ENCOUNTER SYMPTOMS
HEADACHES: 0
DIARRHEA: 0
DIZZINESS: 0
ABDOMINAL PAIN: 1
DOUBLE VISION: 0
COUGH: 0
HEMOPTYSIS: 0
TINGLING: 0
BLURRED VISION: 1
VOMITING: 0
EYE PAIN: 0
PALPITATIONS: 0
FOCAL WEAKNESS: 1
NAUSEA: 0
PHOTOPHOBIA: 0
DEPRESSION: 0
MYALGIAS: 1

## 2020-09-07 ASSESSMENT — PAIN DESCRIPTION - PAIN TYPE
TYPE: ACUTE PAIN

## 2020-09-07 NOTE — PROGRESS NOTES
0900: Pt reporting flank pain. Notified Dr. Sylvester of pt's report as well as urine output and urine quality, notified of redness at pt's suprapubic site, notified of pt's blood pressures trending in the 90s, notified of pt's low fluid intake and food intake. Dr. Sylvester stated pt is likely dehydrated,  IV maintenance fluids ordered, MD assesssed pt.     1400: Report give to SONYA Hedrick who is assuming care of pt, all questions answered.

## 2020-09-07 NOTE — PROGRESS NOTES
Rounding APRN notified of SBP less than 90. Pt asymptomatic at this time. No orders received. Will continue to monitor.

## 2020-09-07 NOTE — PROGRESS NOTES
[facility] Medicine Daily Progress Note    Date of Service  9/7/2020     Chief Complaint  29 y.o. female admitted 8/15/2020 with blurred vision.    Hospital Course    PMH transverse myelitis, asthma, chronic relapsing inflammatory optic neuropathy, suprapubic cath, DM who presented with HA and blurred vision. CTA head and neck and TTE were unremarkable. Dr. Yousif was consulted, recommended conservative medication management for her transient monocular visual loss. She also was found with UTI ESBL klebsiella. She is unable to return home, grandmother unable to care for her. Case management has been closely involved.       Interval Problem Update  Last night patient had episode of shortness of breath, chest x-ray showed no acute process, patient was given a breathing treatment without improvement, Ativan was given which resolved her dyspnea.  Patient has had no repeat dyspnea since.  UA yesterday did show numerous WBCs and positive leukocyte esterase will restart Merrem as patient has history of ESBL, cultures pending. Also showed budding yeast, which were not present on previous UA, will start antifungals.   9/4: Laying in bed.  Lethargic.  Easily aroused to stimulus but just quickly back to sleep.  No acute distress noted.  No seizure activity has been noted by staff overnight.  Afebrile.  Hemodynamically stable.  Pending EEG ordered yesterday for presumed seizure activity.  Suprapubic Andrade catheter has been replaced yesterday by IR.  No issues overnight per staff.  9/5: Lethargic.  Awakens to verbal stimulus quickly drifts back to sleep.  Inquiring about palliative team and when they will come and speak with her.  No acute distress noted.  Has been afebrile.  No issues overnight per staff.  9/6: Resting in bed comfortably.  Stated that she feels confused.  Shortly after had a brief episode of what looks like a seizure that was aborted with IV Ativan.  Went into brief postictal phase.  Continues to be lethargic  throughout the day which resulting and poor p.o. intake.  Hemodynamically stable.  Febrile.  No issues overnight per staff.  9/7: Continues to be lethargic.  Poor p.o. fluid intake noted by staff and evidenced by decrease in urine output.  No seizures has been reported since yesterday.  Consultants/Specialty  Ophtho Dr. Soliz  Neuro-ophtho Dr. Yousif  Neurology  Code Status  DNAR/DNI    Disposition  grandmother unable to care for her  Longterm SNF pending      I certify that the patient requires continued medically necessary services for the treatment of recurrent urinary tract infections in the presence of a suprapubic catheter and will remain in the hospital for 20 days.  Discharge plan is anticipated to be to skilled nursing facility.    Review of Systems  Review of Systems   Constitutional: Positive for malaise/fatigue.   HENT: Negative for hearing loss.    Eyes: Positive for blurred vision (mild). Negative for double vision, photophobia and pain.   Respiratory: Negative for cough and hemoptysis.    Cardiovascular: Negative for chest pain and palpitations.   Gastrointestinal: Positive for abdominal pain (around cath site). Negative for diarrhea, nausea and vomiting.   Genitourinary: Negative for dysuria and urgency.   Musculoskeletal: Positive for myalgias.        Pain b/l feet    Skin: Negative for rash.   Neurological: Positive for focal weakness (chronic). Negative for dizziness, tingling and headaches.   Psychiatric/Behavioral: Negative for depression and suicidal ideas.        Physical Exam  Temp:  [35.9 °C (96.7 °F)-36.4 °C (97.5 °F)] 35.9 °C (96.7 °F)  Pulse:  [50-88] 57  Resp:  [14-20] 16  BP: ()/(47-60) 100/57  SpO2:  [94 %-97 %] 97 %    Physical Exam  Constitutional:       Appearance: She is obese. She is not toxic-appearing or diaphoretic.      Comments: Appears fatigued   HENT:      Head: Normocephalic and atraumatic.      Mouth/Throat:      Mouth: Mucous membranes are moist.   Eyes:       General:         Right eye: No discharge.         Left eye: No discharge.   Neck:      Musculoskeletal: Neck supple.   Cardiovascular:      Rate and Rhythm: Normal rate and regular rhythm.      Pulses: Normal pulses.   Pulmonary:      Breath sounds: No wheezing, rhonchi or rales.      Comments: Diminished througout  Abdominal:      General: There is no distension.      Tenderness: Tenderness: suprapubic near cath site.      Comments: suprapubic cath in place, no drainage seen, mildly erythematous around tube   Musculoskeletal:         General: Tenderness (b/l feet and ankles ) present.      Right lower leg: No edema.      Left lower leg: No edema.      Comments: Nonpitting edema to legs   Skin:     General: Skin is warm and dry.   Neurological:      Mental Status: She is oriented to person, place, and time and easily aroused. She is lethargic.      Motor: Weakness present.   Psychiatric:         Mood and Affect: Mood is depressed.         Fluids    Intake/Output Summary (Last 24 hours) at 9/7/2020 1232  Last data filed at 9/7/2020 0800  Gross per 24 hour   Intake 700 ml   Output 500 ml   Net 200 ml       Laboratory  Recent Labs     09/07/20  0922   WBC 7.3   RBC 4.34   HEMOGLOBIN 12.7   HEMATOCRIT 39.6   MCV 91.2   MCH 29.3   MCHC 32.1*   RDW 44.9   PLATELETCT 170   MPV 12.4     Recent Labs     09/07/20  0922   SODIUM 141   POTASSIUM 3.2*   CHLORIDE 112   CO2 15*   GLUCOSE 129*   BUN 12   CREATININE 0.80   CALCIUM 8.9                   Imaging  CT-HEAD W/O   Final Result      No evidence of acute intracranial process.      IR-DRAINAGE-CATH EXCHANGE   Final Result      Successful image guided suprapubic tube replacement with upsizing.                  DX-CHEST-LIMITED (1 VIEW)   Final Result      Hypoinflation without other evidence for acute cardiopulmonary disease.      IR-US GUIDED PIV   Final Result    Ultrasound-guided PERIPHERAL IV INSERTION performed by    qualified nursing staff as above.      CT-CTA CHEST  PULMONARY ARTERY W/ RECONS   Final Result      No CT evidence of acute pulmonary thromboembolism      Mild splenomegaly      New small pleural effusions            US-BLADDER   Final Result         1.  Right ureteral jet is not definitively identified, otherwise unremarkable exam.      DX-SHOULDER 2+ LEFT   Final Result      1.  Normal left shoulder series.      DX-CHEST-PORTABLE (1 VIEW)   Final Result         1.  No acute cardiopulmonary disease.      US-BLADDER   Final Result      Suprapubic catheter balloon is inflated, presumably within the collapsed bladder. If more definitive analysis is required, consider clamping the bladder catheter for  hours prior to repeat ultrasound, allowing the bladder to fill with fluid and therefore    better see the bladder walls      CT-HEAD W/O   Final Result         1.  No acute intracranial abnormality.      DX-WRIST-COMPLETE 3+ RIGHT   Final Result      No acute fracture.  If pain persists, recommend repeat imaging in 7 to 10 days.      EC-ECHOCARDIOGRAM COMPLETE W/ CONT   Final Result      CT-CTA NECK WITH & W/O-POST PROCESSING   Final Result      No high-grade stenosis, large vessel occlusion, aneurysm or dissection.      CT-CTA HEAD WITH & W/O-POST PROCESS   Final Result      No intracranial aneurysm, focal stenosis, or abrupt large vessel cut off.      US-CAROTID DOPPLER BILAT   Final Result      CT-HEAD W/O   Final Result      No CT evidence of acute infarct, hemorrhage or mass.           Assessment/Plan  * Bacteriuria- (present on admission)  Assessment & Plan  Likely colonized urinary tract given history of indwelling suprapubic catheter  ESBL klebsiella--> IV meropenem x 7 days completed on 8/26  UA 9/2/2020 shows budding yeast, and new UTI  9/4: Repeated UA was done prior of Andrade catheter exchange which likely will be positive due to colonization.  There was budding yeast on UA which also considered as colonization.  However, patient was started on Merrem again  which will be discontinued.  Will only complete 5 days course of Diflucan since repeat urine culture was not obtained.    Optic neuritis- (present on admission)  Assessment & Plan  Continue outpatient prednisone   ophthalmology consulted, Dr. Aparicio.. signed off  continue home Cellcept   cta head and neck--WNL  Echo.  Within normal limits  hypercoag w./u.. Unremarkable except for a mild elevation of homocystine  No candidate for MRI due to bladder stimulator             Diabetes mellitus type 2 in obese (HCC)- (present on admission)  Assessment & Plan  Lantus decreased to 8U qhs  Follow-up A1c is 5  Glucose well controlled  9/7: Noted to have normal low blood glucoses.  Likely due to poor p.o. intake.  Will monitor for today and consider reducing/stopping long acting insulin and initiate sliding scale.    Presence of suprapubic catheter ([facility])- (present on admission)  Assessment & Plan  Was placed 8/6/2020  Intermittent hematuria. Bladder scans have been umremarkable. Hematuria improved after stopping lovenox dvt ppx.  8/26 - spoke with Dr. Galicia, would wait 1 month to exchange cath, ok to change with IR after 1 more week, recommends upsizing  Placed IR order for suprapubic catheter exchange per urology recommendations  9/4: Suprapubic cath exchanged by IR on 9/3.     Morbidly obese (HCC)- (present on admission)  Assessment & Plan  BMI 44.2 kilograms per square meter    Poor fluid intake- (present on admission)  Assessment & Plan  Noted decrease in urine output with obvious concentrated urine.  Poor p.o. fluid intake.  We will give fluid resuscitation through IV for 24 hours  We will encourage patient to increase p.o. intake of fluids.    Seizure ([facility])- (present on admission)  Assessment & Plan  9/4: Noted to have 2 seizure activities on the floor by nursing staff followed by pos ictal state.  CT scan of the head was ordered which was negative for any acute intracranial findings.  EEG has been ordered as well.   Neurology consulted.  PRN Ativan for breakthrough seizures.  9/5: EEG was unremarkable.  Neurology not recommending any AED.  9/6: Had another seizure this morning that was aborted by Ativan.  Could be possibly psychogenic concerning that patient has conversion disorder and other psychological issues.  At this point psychiatry has no further recommendations.  We will continue to monitor and if patient develop another seizure, will consider continuous video EEG.    Wrist injury, right, initial encounter  Assessment & Plan  Xray negative for fracture    Pain control    Dry eyes- (present on admission)  Assessment & Plan  Eye lubrication     Pain in both feet- (present on admission)  Assessment & Plan  Pain control with po oxycodone        Idiopathic intracranial hypertension- (present on admission)  Assessment & Plan  History of  She is followed by Dr. Yousif, neuro-ophthalmology   continue home Diamox  Supportive care as suggested by neuro-ophthalmology    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HCC)- (present on admission)  Assessment & Plan  Stable at baseline nonambulatory state,    Blurred vision  Assessment & Plan  Presumed retinopathy  monitor    Mild intermittent asthma without complication- (present on admission)  Assessment & Plan  Intermittent flares, affected by smoke  RT protocol, albuterol PRN  CXR no acute process  Likely intermittent dyspnea has anxiety component, resolved with ativan      History of atrial fibrillation and cardiomyopathy?- (present on admission)  Assessment & Plan  No events on telemetry, will stop    Schizophrenia ([facility])- (present on admission)  Assessment & Plan  History of schizophrenia and conversion disorder  Continue Geodon and Prozac     Peripheral neuropathy and chornic pain syndrome (CMS-HCC)- (present on admission)  Assessment & Plan  At risk of morbid obesity hypoventilation, avoid excessive medications limiting respiratory drive.       VTE prophylaxis: scds, hematuria

## 2020-09-07 NOTE — CARE PLAN
Problem: Discharge Barriers/Planning  Goal: Patient's continuum of care needs will be met  Outcome: PROGRESSING AS EXPECTED  Note: Educating pt on plan of care and discharge plan     Problem: Psychosocial Needs:  Goal: Level of anxiety will decrease  Outcome: PROGRESSING AS EXPECTED  Note: Encouraging pt to voice feelings, providing emotional support as needed

## 2020-09-07 NOTE — PROGRESS NOTES
Monitor Summary: SB-SR rate 54-82, CO -.16, QRS -.08, QT -.40, with brief drop to 49 beats per minute per strip from the monitor room.

## 2020-09-07 NOTE — PROGRESS NOTES
Assumed care of pt at 1400.  Pt alert and oriented x4, sleeps between care.  Denies any pain or discomfort.  SP cath in place, draining well (IVF was initiated by morning RN after pt was having poor output).  Pt denies having any aura; no seizure activity noted.  Q2h turning and positioning provided.  Bed low and locked, alarm set and call bell within reach.  Hourly rounding continues.

## 2020-09-07 NOTE — CARE PLAN
Problem: Safety  Goal: Will remain free from injury  Outcome: PROGRESSING AS EXPECTED  Note: Educated pt on POC and fall risk. Assessed pts understanding of using call light for assistance.  Fall precautions in place. Call light within reach, appropriate signage placed, treaded socks and bed alarm on.        Problem: Pain Management  Goal: Pain level will decrease to patient's comfort goal  Outcome: PROGRESSING AS EXPECTED  Flowsheets  Taken 9/6/2020 1900 by Isela Sanchez R.N.  Comfort Goal:   Comfort with Movement   Perform Activity  Pain Rating Scale (NPRS): 1  Taken 9/4/2020 0734 by Adali Webb RLESLYE  Non Verbal Scale: Grimacing  Taken 8/31/2020 1730 by Magdalena Orozco R.N.  Gutierrez-Mccain Scale: 0   Taken 8/28/2020 0938 by Wesley Foss OT  Therapist Pain Assessment: (no sign of pn) Nurse Notified  Note: Pt educated on non-pharmacologic pain management measures. PRN pain medication available. Continuously assessing pts pain rating and need for intervention.

## 2020-09-08 LAB — GLUCOSE BLD-MCNC: 84 MG/DL (ref 65–99)

## 2020-09-08 PROCEDURE — 700102 HCHG RX REV CODE 250 W/ 637 OVERRIDE(OP): Performed by: INTERNAL MEDICINE

## 2020-09-08 PROCEDURE — A9270 NON-COVERED ITEM OR SERVICE: HCPCS | Performed by: INTERNAL MEDICINE

## 2020-09-08 PROCEDURE — 82962 GLUCOSE BLOOD TEST: CPT

## 2020-09-08 PROCEDURE — 700102 HCHG RX REV CODE 250 W/ 637 OVERRIDE(OP): Performed by: HOSPITALIST

## 2020-09-08 PROCEDURE — 99232 SBSQ HOSP IP/OBS MODERATE 35: CPT | Performed by: HOSPITALIST

## 2020-09-08 PROCEDURE — A9270 NON-COVERED ITEM OR SERVICE: HCPCS | Performed by: HOSPITALIST

## 2020-09-08 PROCEDURE — 700111 HCHG RX REV CODE 636 W/ 250 OVERRIDE (IP): Performed by: INTERNAL MEDICINE

## 2020-09-08 PROCEDURE — 700102 HCHG RX REV CODE 250 W/ 637 OVERRIDE(OP): Performed by: FAMILY MEDICINE

## 2020-09-08 PROCEDURE — A9270 NON-COVERED ITEM OR SERVICE: HCPCS | Performed by: FAMILY MEDICINE

## 2020-09-08 PROCEDURE — 700105 HCHG RX REV CODE 258: Performed by: FAMILY MEDICINE

## 2020-09-08 PROCEDURE — 770020 HCHG ROOM/CARE - TELE (206)

## 2020-09-08 PROCEDURE — 51798 US URINE CAPACITY MEASURE: CPT

## 2020-09-08 PROCEDURE — 700111 HCHG RX REV CODE 636 W/ 250 OVERRIDE (IP): Performed by: HOSPITALIST

## 2020-09-08 RX ADMIN — BUSPIRONE HYDROCHLORIDE 10 MG: 10 TABLET ORAL at 20:16

## 2020-09-08 RX ADMIN — PREGABALIN 300 MG: 150 CAPSULE ORAL at 04:42

## 2020-09-08 RX ADMIN — ACETAMINOPHEN 500 MG: 500 TABLET ORAL at 08:44

## 2020-09-08 RX ADMIN — METHOCARBAMOL TABLETS 500 MG: 500 TABLET, COATED ORAL at 08:44

## 2020-09-08 RX ADMIN — OXCARBAZEPINE 150 MG: 150 TABLET, FILM COATED ORAL at 17:37

## 2020-09-08 RX ADMIN — DOCUSATE SODIUM 50 MG AND SENNOSIDES 8.6 MG 2 TABLET: 8.6; 5 TABLET, FILM COATED ORAL at 17:16

## 2020-09-08 RX ADMIN — MICONAZOLE NITRATE: 20 CREAM TOPICAL at 04:42

## 2020-09-08 RX ADMIN — IVABRADINE 7.5 MG: 7.5 TABLET, FILM COATED ORAL at 17:15

## 2020-09-08 RX ADMIN — IVABRADINE 7.5 MG: 7.5 TABLET, FILM COATED ORAL at 08:43

## 2020-09-08 RX ADMIN — MYCOPHENOLATE MOFETIL 1000 MG: 250 CAPSULE ORAL at 04:42

## 2020-09-08 RX ADMIN — TRAZODONE HYDROCHLORIDE 100 MG: 100 TABLET ORAL at 20:16

## 2020-09-08 RX ADMIN — SODIUM BICARBONATE 650 MG: 650 TABLET ORAL at 08:44

## 2020-09-08 RX ADMIN — MINERAL OIL, PETROLATUM 1 APPLICATION: 425; 573 OINTMENT OPHTHALMIC at 12:44

## 2020-09-08 RX ADMIN — SODIUM CHLORIDE, POTASSIUM CHLORIDE, SODIUM LACTATE AND CALCIUM CHLORIDE: 600; 310; 30; 20 INJECTION, SOLUTION INTRAVENOUS at 01:09

## 2020-09-08 RX ADMIN — DOCUSATE SODIUM 100 MG: 100 CAPSULE, LIQUID FILLED ORAL at 17:16

## 2020-09-08 RX ADMIN — MICONAZOLE NITRATE: 20 CREAM TOPICAL at 17:17

## 2020-09-08 RX ADMIN — MINERAL OIL, PETROLATUM 1 APPLICATION: 425; 573 OINTMENT OPHTHALMIC at 04:41

## 2020-09-08 RX ADMIN — ACETAMINOPHEN 500 MG: 500 TABLET ORAL at 17:15

## 2020-09-08 RX ADMIN — ZIPRASIDONE HYDROCHLORIDE 40 MG: 40 CAPSULE ORAL at 08:44

## 2020-09-08 RX ADMIN — ASPIRIN 81 MG: 81 TABLET, COATED ORAL at 04:41

## 2020-09-08 RX ADMIN — ACETAZOLAMIDE 1500 MG: 500 CAPSULE, EXTENDED RELEASE ORAL at 04:41

## 2020-09-08 RX ADMIN — TOPIRAMATE 50 MG: 25 TABLET, FILM COATED ORAL at 17:16

## 2020-09-08 RX ADMIN — OXYBUTYNIN CHLORIDE 5 MG: 5 TABLET ORAL at 04:42

## 2020-09-08 RX ADMIN — TOPIRAMATE 50 MG: 25 TABLET, FILM COATED ORAL at 04:42

## 2020-09-08 RX ADMIN — PREDNISONE 10 MG: 10 TABLET ORAL at 04:42

## 2020-09-08 RX ADMIN — METHOCARBAMOL TABLETS 500 MG: 500 TABLET, COATED ORAL at 12:43

## 2020-09-08 RX ADMIN — SODIUM BICARBONATE 650 MG: 650 TABLET ORAL at 16:12

## 2020-09-08 RX ADMIN — SODIUM BICARBONATE 650 MG: 650 TABLET ORAL at 20:16

## 2020-09-08 RX ADMIN — PREGABALIN 300 MG: 150 CAPSULE ORAL at 17:15

## 2020-09-08 RX ADMIN — OXYBUTYNIN CHLORIDE 5 MG: 5 TABLET ORAL at 12:43

## 2020-09-08 RX ADMIN — ZIPRASIDONE HYDROCHLORIDE 40 MG: 40 CAPSULE ORAL at 17:16

## 2020-09-08 RX ADMIN — METHOCARBAMOL TABLETS 500 MG: 500 TABLET, COATED ORAL at 17:15

## 2020-09-08 RX ADMIN — OXCARBAZEPINE 150 MG: 150 TABLET, FILM COATED ORAL at 04:42

## 2020-09-08 RX ADMIN — GABAPENTIN 300 MG: 300 CAPSULE ORAL at 17:16

## 2020-09-08 RX ADMIN — METHOCARBAMOL TABLETS 500 MG: 500 TABLET, COATED ORAL at 20:16

## 2020-09-08 RX ADMIN — MINERAL OIL, PETROLATUM 1 APPLICATION: 425; 573 OINTMENT OPHTHALMIC at 17:17

## 2020-09-08 RX ADMIN — FLUOXETINE 40 MG: 20 CAPSULE ORAL at 04:42

## 2020-09-08 RX ADMIN — GABAPENTIN 300 MG: 300 CAPSULE ORAL at 12:43

## 2020-09-08 RX ADMIN — SODIUM BICARBONATE 650 MG: 650 TABLET ORAL at 03:15

## 2020-09-08 RX ADMIN — ACETAZOLAMIDE 1000 MG: 500 CAPSULE, EXTENDED RELEASE ORAL at 17:15

## 2020-09-08 RX ADMIN — OMEPRAZOLE 20 MG: 20 CAPSULE, DELAYED RELEASE ORAL at 04:42

## 2020-09-08 RX ADMIN — LEVOTHYROXINE SODIUM 75 MCG: 0.07 TABLET ORAL at 04:42

## 2020-09-08 RX ADMIN — OXYBUTYNIN CHLORIDE 5 MG: 5 TABLET ORAL at 17:16

## 2020-09-08 RX ADMIN — BUSPIRONE HYDROCHLORIDE 10 MG: 10 TABLET ORAL at 08:44

## 2020-09-08 RX ADMIN — GABAPENTIN 300 MG: 300 CAPSULE ORAL at 04:42

## 2020-09-08 RX ADMIN — MYCOPHENOLATE MOFETIL 1000 MG: 250 CAPSULE ORAL at 17:17

## 2020-09-08 ASSESSMENT — COGNITIVE AND FUNCTIONAL STATUS - GENERAL
DRESSING REGULAR LOWER BODY CLOTHING: A LOT
DAILY ACTIVITIY SCORE: 14
TOILETING: A LOT
SUGGESTED CMS G CODE MODIFIER MOBILITY: CM
TURNING FROM BACK TO SIDE WHILE IN FLAT BAD: A LOT
CLIMB 3 TO 5 STEPS WITH RAILING: TOTAL
PERSONAL GROOMING: A LITTLE
HELP NEEDED FOR BATHING: A LOT
MOVING FROM LYING ON BACK TO SITTING ON SIDE OF FLAT BED: UNABLE
EATING MEALS: A LITTLE
MOBILITY SCORE: 8
MOVING TO AND FROM BED TO CHAIR: UNABLE
STANDING UP FROM CHAIR USING ARMS: A LOT
SUGGESTED CMS G CODE MODIFIER DAILY ACTIVITY: CK
WALKING IN HOSPITAL ROOM: TOTAL
DRESSING REGULAR UPPER BODY CLOTHING: A LOT

## 2020-09-08 ASSESSMENT — ENCOUNTER SYMPTOMS
MYALGIAS: 1
DIARRHEA: 0
DEPRESSION: 0
ABDOMINAL PAIN: 1
HEMOPTYSIS: 0
PALPITATIONS: 0
DIZZINESS: 0
NAUSEA: 0
VOMITING: 0
EYE PAIN: 0
COUGH: 0
BLURRED VISION: 1
PHOTOPHOBIA: 0
TINGLING: 0
DOUBLE VISION: 0
HEADACHES: 0
FOCAL WEAKNESS: 1

## 2020-09-08 NOTE — PROGRESS NOTES
With patient’s permission, completed daily phone call to designated support person, name Vivienne.  Discussed patient condition and plan of care. All questions answered.  Follow up requested: Waiting for LTAC bed

## 2020-09-08 NOTE — CARE PLAN
Problem: Communication  Goal: The ability to communicate needs accurately and effectively will improve  Outcome: PROGRESSING AS EXPECTED  Note: Pt communicates needs with hospital staff.     Problem: Safety  Goal: Will remain free from injury  Outcome: PROGRESSING AS EXPECTED  Note: Patient uses call light appropriately. Pt does not attempt to exit bed independently.

## 2020-09-08 NOTE — PROGRESS NOTES
"Seizure Note:    1940 Pt stated she \"felt weird like how I felt before my other seizure,\" and reported seeing an aura.    1943 Pt became unresponsive with mild convulsions in upper and lower extremities. Sternal rub performed and pt became responsive and verbal. Episode was least than 1 minute No ativan given. MD notified.  reporting oxygen saturation greater than 93% for the duration of episode. Believed to be a psychogenic seizure      "

## 2020-09-08 NOTE — PROGRESS NOTES
[facility] Medicine Daily Progress Note    Date of Service  9/8/2020     Chief Complaint  29 y.o. female admitted 8/15/2020 with blurred vision.    Hospital Course    PMH transverse myelitis, asthma, chronic relapsing inflammatory optic neuropathy, suprapubic cath, DM who presented with HA and blurred vision. CTA head and neck and TTE were unremarkable. Dr. Yousif was consulted, recommended conservative medication management for her transient monocular visual loss. She also was found with UTI ESBL klebsiella. She is unable to return home, grandmother unable to care for her. Case management has been closely involved.       Interval Problem Update  Last night patient had episode of shortness of breath, chest x-ray showed no acute process, patient was given a breathing treatment without improvement, Ativan was given which resolved her dyspnea.  Patient has had no repeat dyspnea since.  UA yesterday did show numerous WBCs and positive leukocyte esterase will restart Merrem as patient has history of ESBL, cultures pending. Also showed budding yeast, which were not present on previous UA, will start antifungals.   9/4: Laying in bed.  Lethargic.  Easily aroused to stimulus but just quickly back to sleep.  No acute distress noted.  No seizure activity has been noted by staff overnight.  Afebrile.  Hemodynamically stable.  Pending EEG ordered yesterday for presumed seizure activity.  Suprapubic Andrade catheter has been replaced yesterday by IR.  No issues overnight per staff.  9/5: Lethargic.  Awakens to verbal stimulus quickly drifts back to sleep.  Inquiring about palliative team and when they will come and speak with her.  No acute distress noted.  Has been afebrile.  No issues overnight per staff.  9/6: Resting in bed comfortably.  Stated that she feels confused.  Shortly after had a brief episode of what looks like a seizure that was aborted with IV Ativan.  Went into brief postictal phase.  Continues to be lethargic  throughout the day which resulting and poor p.o. intake.  Hemodynamically stable.  Febrile.  No issues overnight per staff.  9/7: Continues to be lethargic.  Poor p.o. fluid intake noted by staff and evidenced by decrease in urine output.  No seizures has been reported since yesterday.  9/8: no acute events overnight, now tolerating diet and oral intake. No seizures noted today or throughout the night. VS stable.     Consultants/Specialty  Ophtho Dr. Soliz  Neuro-ophtho Dr. Yousif  Neurology  Code Status  DNAR/DNI    Disposition  grandmother unable to care for her  Longterm SNF pending      I certify that the patient requires continued medically necessary services for the treatment of recurrent urinary tract infections in the presence of a suprapubic catheter and will remain in the hospital for 20 days.  Discharge plan is anticipated to be to skilled nursing facility.    Review of Systems  Review of Systems   Constitutional: Positive for malaise/fatigue.   HENT: Negative for hearing loss.    Eyes: Positive for blurred vision (mild). Negative for double vision, photophobia and pain.   Respiratory: Negative for cough and hemoptysis.    Cardiovascular: Negative for chest pain and palpitations.   Gastrointestinal: Positive for abdominal pain (around cath site). Negative for diarrhea, nausea and vomiting.   Genitourinary: Negative for dysuria and urgency.   Musculoskeletal: Positive for myalgias.        Pain b/l feet    Skin: Negative for rash.   Neurological: Positive for focal weakness (chronic). Negative for dizziness, tingling and headaches.   Psychiatric/Behavioral: Negative for depression and suicidal ideas.        Physical Exam  Temp:  [35.9 °C (96.7 °F)-36.6 °C (97.8 °F)] 36.3 °C (97.4 °F)  Pulse:  [50-65] 50  Resp:  [16] 16  BP: ()/(53-68) 92/57  SpO2:  [91 %-96 %] 96 %    Physical Exam  Constitutional:       Appearance: She is obese. She is not toxic-appearing or diaphoretic.      Comments: Appears  fatigued   HENT:      Head: Normocephalic and atraumatic.      Mouth/Throat:      Mouth: Mucous membranes are moist.   Eyes:      General:         Right eye: No discharge.         Left eye: No discharge.   Neck:      Musculoskeletal: Neck supple.   Cardiovascular:      Rate and Rhythm: Normal rate and regular rhythm.      Pulses: Normal pulses.   Pulmonary:      Breath sounds: No wheezing, rhonchi or rales.      Comments: Diminished througout  Abdominal:      General: There is no distension.      Tenderness: Tenderness: suprapubic near cath site.      Comments: suprapubic cath in place, no drainage seen, mildly erythematous around tube   Musculoskeletal:         General: Tenderness (b/l feet and ankles ) present.      Right lower leg: No edema.      Left lower leg: No edema.      Comments: Nonpitting edema to legs   Skin:     General: Skin is warm and dry.   Neurological:      Mental Status: She is oriented to person, place, and time and easily aroused. She is lethargic.      Motor: Weakness present.   Psychiatric:         Mood and Affect: Mood is depressed.         Fluids    Intake/Output Summary (Last 24 hours) at 9/8/2020 1204  Last data filed at 9/8/2020 0400  Gross per 24 hour   Intake 30 ml   Output 1900 ml   Net -1870 ml       Laboratory  Recent Labs     09/07/20  0922   WBC 7.3   RBC 4.34   HEMOGLOBIN 12.7   HEMATOCRIT 39.6   MCV 91.2   MCH 29.3   MCHC 32.1*   RDW 44.9   PLATELETCT 170   MPV 12.4     Recent Labs     09/07/20  0922   SODIUM 141   POTASSIUM 3.2*   CHLORIDE 112   CO2 15*   GLUCOSE 129*   BUN 12   CREATININE 0.80   CALCIUM 8.9                   Imaging  CT-HEAD W/O   Final Result      No evidence of acute intracranial process.      IR-DRAINAGE-CATH EXCHANGE   Final Result      Successful image guided suprapubic tube replacement with upsizing.                  DX-CHEST-LIMITED (1 VIEW)   Final Result      Hypoinflation without other evidence for acute cardiopulmonary disease.      IR-US GUIDED  PIV   Final Result    Ultrasound-guided PERIPHERAL IV INSERTION performed by    qualified nursing staff as above.      CT-CTA CHEST PULMONARY ARTERY W/ RECONS   Final Result      No CT evidence of acute pulmonary thromboembolism      Mild splenomegaly      New small pleural effusions            US-BLADDER   Final Result         1.  Right ureteral jet is not definitively identified, otherwise unremarkable exam.      DX-SHOULDER 2+ LEFT   Final Result      1.  Normal left shoulder series.      DX-CHEST-PORTABLE (1 VIEW)   Final Result         1.  No acute cardiopulmonary disease.      US-BLADDER   Final Result      Suprapubic catheter balloon is inflated, presumably within the collapsed bladder. If more definitive analysis is required, consider clamping the bladder catheter for  hours prior to repeat ultrasound, allowing the bladder to fill with fluid and therefore    better see the bladder walls      CT-HEAD W/O   Final Result         1.  No acute intracranial abnormality.      DX-WRIST-COMPLETE 3+ RIGHT   Final Result      No acute fracture.  If pain persists, recommend repeat imaging in 7 to 10 days.      EC-ECHOCARDIOGRAM COMPLETE W/ CONT   Final Result      CT-CTA NECK WITH & W/O-POST PROCESSING   Final Result      No high-grade stenosis, large vessel occlusion, aneurysm or dissection.      CT-CTA HEAD WITH & W/O-POST PROCESS   Final Result      No intracranial aneurysm, focal stenosis, or abrupt large vessel cut off.      US-CAROTID DOPPLER BILAT   Final Result      CT-HEAD W/O   Final Result      No CT evidence of acute infarct, hemorrhage or mass.           Assessment/Plan  * Bacteriuria- (present on admission)  Assessment & Plan  Likely colonized urinary tract given history of indwelling suprapubic catheter  ESBL klebsiella--> IV meropenem x 7 days completed on 8/26  UA 9/2/2020 shows budding yeast, and new UTI  9/4: Repeated UA was done prior of Andrade catheter exchange which likely will be positive due to  colonization.  There was budding yeast on UA which also considered as colonization.  However, patient was started on Merrem again which will be discontinued.  Will only complete 5 days course of Diflucan since repeat urine culture was not obtained.    Pending LTACH placement    Optic neuritis- (present on admission)  Assessment & Plan  Continue outpatient prednisone   ophthalmology consulted, Dr. Aparicio.. signed off  continue home Cellcept   cta head and neck--WNL  Echo.  Within normal limits  hypercoag w./u.. Unremarkable except for a mild elevation of homocystine  No candidate for MRI due to bladder stimulator         Diabetes mellitus type 2 in obese (HCC)- (present on admission)  Assessment & Plan  Lantus decreased to 8U qhs  Follow-up A1c is 5  Glucose well controlled  9/7: Noted to have normal low blood glucoses.  Likely due to poor p.o. intake.  Will monitor for today and consider reducing/stopping long acting insulin and initiate sliding scale.    Presence of suprapubic catheter (HCC)- (present on admission)  Assessment & Plan  Was placed 8/6/2020  Intermittent hematuria. Bladder scans have been umremarkable. Hematuria improved after stopping lovenox dvt ppx.  8/26 - spoke with Dr. Galicia, would wait 1 month to exchange cath, ok to change with IR after 1 more week, recommends upsizing  Placed IR order for suprapubic catheter exchange per urology recommendations  9/4: Suprapubic cath exchanged by IR on 9/3.     Morbidly obese (HCC)- (present on admission)  Assessment & Plan  BMI 44.2 kilograms per square meter    Poor fluid intake- (present on admission)  Assessment & Plan  Encourage PO intake     Seizure (HCC)- (present on admission)  Assessment & Plan  9/4: Noted to have 2 seizure activities on the floor by nursing staff followed by pos ictal state.  CT scan of the head was ordered which was negative for any acute intracranial findings.  EEG has been ordered as well.  Neurology consulted.  PRN Ativan  for breakthrough seizures.  9/5: EEG was unremarkable.  Neurology not recommending any AED.  9/6: Had another seizure this morning that was aborted by Ativan.  Could be possibly psychogenic concerning that patient has conversion disorder and other psychological issues.  At this point psychiatry has no further recommendations.  We will continue to monitor and if patient develop another seizure, will consider continuous video EEG.    No repeat seizure activity may need VEEG if repeat activity       Wrist injury, right, initial encounter  Assessment & Plan  Xray negative for fracture    Pain control    Dry eyes- (present on admission)  Assessment & Plan  Eye lubrication     Pain in both feet- (present on admission)  Assessment & Plan  Pain control with po oxycodone        Idiopathic intracranial hypertension- (present on admission)  Assessment & Plan  History of  She is followed by Dr. Yousif, neuro-ophthalmology   continue home Diamox  Supportive care as suggested by neuro-ophthalmology    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HCC)- (present on admission)  Assessment & Plan  Stable at baseline nonambulatory state,    Blurred vision  Assessment & Plan  Presumed retinopathy  monitor    Mild intermittent asthma without complication- (present on admission)  Assessment & Plan  Intermittent flares, affected by smoke  RT protocol, albuterol PRN  CXR no acute process  Likely intermittent dyspnea has anxiety component, resolved with ativan      History of atrial fibrillation and cardiomyopathy?- (present on admission)  Assessment & Plan  No events on telemetry, will stop    Schizophrenia ([facility])- (present on admission)  Assessment & Plan  History of schizophrenia and conversion disorder  Continue Geodon and Prozac     Peripheral neuropathy and chornic pain syndrome (CMS-HCC)- (present on admission)  Assessment & Plan  At risk of morbid obesity hypoventilation, avoid excessive medications limiting respiratory drive.       VTE prophylaxis: scds,  hematuria

## 2020-09-08 NOTE — CARE PLAN
Problem: Safety  Goal: Will remain free from injury  Outcome: PROGRESSING AS EXPECTED  Note: Seizure precautions in place, 3 bedrails up, bedrails padded, oxygen and suction at the bedside and within reach, pt educated to communicate the feeling of an onset of a seizure     Problem: Urinary Elimination:  Goal: Ability to reestablish a normal urinary elimination pattern will improve  Outcome: PROGRESSING AS EXPECTED  Flowsheets (Taken 9/7/2020 1437 by Floridalma Michaels R.N.)  Urinary Elimination: Catheter (Document on LDA)  Note: Pt has a re-established suprapubic catheter, draining and will continue to monitor

## 2020-09-09 LAB — GLUCOSE BLD-MCNC: 109 MG/DL (ref 65–99)

## 2020-09-09 PROCEDURE — 700111 HCHG RX REV CODE 636 W/ 250 OVERRIDE (IP): Performed by: HOSPITALIST

## 2020-09-09 PROCEDURE — A9270 NON-COVERED ITEM OR SERVICE: HCPCS | Performed by: INTERNAL MEDICINE

## 2020-09-09 PROCEDURE — 700102 HCHG RX REV CODE 250 W/ 637 OVERRIDE(OP): Performed by: INTERNAL MEDICINE

## 2020-09-09 PROCEDURE — A9270 NON-COVERED ITEM OR SERVICE: HCPCS | Performed by: HOSPITALIST

## 2020-09-09 PROCEDURE — 93005 ELECTROCARDIOGRAM TRACING: CPT | Performed by: HOSPITALIST

## 2020-09-09 PROCEDURE — 770020 HCHG ROOM/CARE - TELE (206)

## 2020-09-09 PROCEDURE — 82962 GLUCOSE BLOOD TEST: CPT

## 2020-09-09 PROCEDURE — 700102 HCHG RX REV CODE 250 W/ 637 OVERRIDE(OP): Performed by: FAMILY MEDICINE

## 2020-09-09 PROCEDURE — 700102 HCHG RX REV CODE 250 W/ 637 OVERRIDE(OP): Performed by: HOSPITALIST

## 2020-09-09 PROCEDURE — A9270 NON-COVERED ITEM OR SERVICE: HCPCS | Performed by: FAMILY MEDICINE

## 2020-09-09 PROCEDURE — 700111 HCHG RX REV CODE 636 W/ 250 OVERRIDE (IP): Performed by: INTERNAL MEDICINE

## 2020-09-09 PROCEDURE — 99232 SBSQ HOSP IP/OBS MODERATE 35: CPT | Performed by: HOSPITALIST

## 2020-09-09 RX ADMIN — OMEPRAZOLE 20 MG: 20 CAPSULE, DELAYED RELEASE ORAL at 04:22

## 2020-09-09 RX ADMIN — GABAPENTIN 300 MG: 300 CAPSULE ORAL at 04:21

## 2020-09-09 RX ADMIN — METHOCARBAMOL TABLETS 500 MG: 500 TABLET, COATED ORAL at 19:52

## 2020-09-09 RX ADMIN — MYCOPHENOLATE MOFETIL 1000 MG: 250 CAPSULE ORAL at 22:07

## 2020-09-09 RX ADMIN — MYCOPHENOLATE MOFETIL 1000 MG: 250 CAPSULE ORAL at 09:46

## 2020-09-09 RX ADMIN — MICONAZOLE NITRATE: 20 CREAM TOPICAL at 04:21

## 2020-09-09 RX ADMIN — SODIUM BICARBONATE 650 MG: 650 TABLET ORAL at 04:22

## 2020-09-09 RX ADMIN — METHOCARBAMOL TABLETS 500 MG: 500 TABLET, COATED ORAL at 08:23

## 2020-09-09 RX ADMIN — OXYCODONE 5 MG: 5 TABLET ORAL at 14:37

## 2020-09-09 RX ADMIN — GABAPENTIN 300 MG: 300 CAPSULE ORAL at 18:39

## 2020-09-09 RX ADMIN — IVABRADINE 7.5 MG: 7.5 TABLET, FILM COATED ORAL at 18:39

## 2020-09-09 RX ADMIN — PREGABALIN 300 MG: 150 CAPSULE ORAL at 04:21

## 2020-09-09 RX ADMIN — ASPIRIN 81 MG: 81 TABLET, COATED ORAL at 04:22

## 2020-09-09 RX ADMIN — ACETAZOLAMIDE 1000 MG: 500 CAPSULE, EXTENDED RELEASE ORAL at 18:38

## 2020-09-09 RX ADMIN — FLUOXETINE 40 MG: 20 CAPSULE ORAL at 04:21

## 2020-09-09 RX ADMIN — ACETAZOLAMIDE 1500 MG: 500 CAPSULE, EXTENDED RELEASE ORAL at 04:21

## 2020-09-09 RX ADMIN — METHOCARBAMOL TABLETS 500 MG: 500 TABLET, COATED ORAL at 11:47

## 2020-09-09 RX ADMIN — OXYBUTYNIN CHLORIDE 5 MG: 5 TABLET ORAL at 11:47

## 2020-09-09 RX ADMIN — OXYBUTYNIN CHLORIDE 5 MG: 5 TABLET ORAL at 04:22

## 2020-09-09 RX ADMIN — GABAPENTIN 300 MG: 300 CAPSULE ORAL at 11:47

## 2020-09-09 RX ADMIN — MINERAL OIL, PETROLATUM 1 APPLICATION: 425; 573 OINTMENT OPHTHALMIC at 04:22

## 2020-09-09 RX ADMIN — LEVOTHYROXINE SODIUM 75 MCG: 0.07 TABLET ORAL at 04:22

## 2020-09-09 RX ADMIN — DOCUSATE SODIUM 100 MG: 100 CAPSULE, LIQUID FILLED ORAL at 18:39

## 2020-09-09 RX ADMIN — TRAZODONE HYDROCHLORIDE 100 MG: 100 TABLET ORAL at 19:52

## 2020-09-09 RX ADMIN — DOCUSATE SODIUM 100 MG: 100 CAPSULE, LIQUID FILLED ORAL at 04:22

## 2020-09-09 RX ADMIN — BUTALBITAL, ACETAMINOPHEN, AND CAFFEINE 2 TABLET: 50; 325; 40 TABLET ORAL at 11:47

## 2020-09-09 RX ADMIN — ZIPRASIDONE HYDROCHLORIDE 40 MG: 40 CAPSULE ORAL at 18:39

## 2020-09-09 RX ADMIN — IVABRADINE 7.5 MG: 7.5 TABLET, FILM COATED ORAL at 08:34

## 2020-09-09 RX ADMIN — OXCARBAZEPINE 150 MG: 150 TABLET, FILM COATED ORAL at 04:22

## 2020-09-09 RX ADMIN — PREDNISONE 10 MG: 10 TABLET ORAL at 04:21

## 2020-09-09 RX ADMIN — MINERAL OIL, PETROLATUM 1 APPLICATION: 425; 573 OINTMENT OPHTHALMIC at 18:39

## 2020-09-09 RX ADMIN — DOCUSATE SODIUM 50 MG AND SENNOSIDES 8.6 MG 2 TABLET: 8.6; 5 TABLET, FILM COATED ORAL at 04:22

## 2020-09-09 RX ADMIN — OXCARBAZEPINE 150 MG: 150 TABLET, FILM COATED ORAL at 18:39

## 2020-09-09 RX ADMIN — SODIUM BICARBONATE 650 MG: 650 TABLET ORAL at 19:52

## 2020-09-09 RX ADMIN — METHOCARBAMOL TABLETS 500 MG: 500 TABLET, COATED ORAL at 18:39

## 2020-09-09 RX ADMIN — TOPIRAMATE 50 MG: 25 TABLET, FILM COATED ORAL at 18:39

## 2020-09-09 RX ADMIN — PREGABALIN 300 MG: 150 CAPSULE ORAL at 18:38

## 2020-09-09 RX ADMIN — SODIUM BICARBONATE 650 MG: 650 TABLET ORAL at 14:37

## 2020-09-09 RX ADMIN — OXYBUTYNIN CHLORIDE 5 MG: 5 TABLET ORAL at 18:39

## 2020-09-09 RX ADMIN — BUSPIRONE HYDROCHLORIDE 10 MG: 10 TABLET ORAL at 08:23

## 2020-09-09 RX ADMIN — ZIPRASIDONE HYDROCHLORIDE 40 MG: 40 CAPSULE ORAL at 08:23

## 2020-09-09 RX ADMIN — BUSPIRONE HYDROCHLORIDE 10 MG: 10 TABLET ORAL at 19:52

## 2020-09-09 RX ADMIN — INSULIN GLARGINE 8 UNITS: 100 INJECTION, SOLUTION SUBCUTANEOUS at 18:43

## 2020-09-09 RX ADMIN — TOPIRAMATE 50 MG: 25 TABLET, FILM COATED ORAL at 04:21

## 2020-09-09 RX ADMIN — SODIUM BICARBONATE 650 MG: 650 TABLET ORAL at 08:24

## 2020-09-09 RX ADMIN — MICONAZOLE NITRATE: 20 CREAM TOPICAL at 18:39

## 2020-09-09 ASSESSMENT — ENCOUNTER SYMPTOMS
DOUBLE VISION: 0
PHOTOPHOBIA: 0
ABDOMINAL PAIN: 1
BLURRED VISION: 1
EYE PAIN: 0
MYALGIAS: 1
TINGLING: 0
NAUSEA: 0
VOMITING: 0
PALPITATIONS: 0
DIARRHEA: 0
FOCAL WEAKNESS: 1
DEPRESSION: 0
DIZZINESS: 0
COUGH: 0
HEADACHES: 0
HEMOPTYSIS: 0

## 2020-09-09 ASSESSMENT — PAIN DESCRIPTION - PAIN TYPE
TYPE: ACUTE PAIN
TYPE: ACUTE PAIN

## 2020-09-09 ASSESSMENT — FIBROSIS 4 INDEX: FIB4 SCORE: 0.28

## 2020-09-09 NOTE — PROGRESS NOTES
[facility] Medicine Daily Progress Note    Date of Service  9/9/2020     Chief Complaint  29 y.o. female admitted 8/15/2020 with blurred vision.    Hospital Course    PMH transverse myelitis, asthma, chronic relapsing inflammatory optic neuropathy, suprapubic cath, DM who presented with HA and blurred vision. CTA head and neck and TTE were unremarkable. Dr. Yousif was consulted, recommended conservative medication management for her transient monocular visual loss. She also was found with UTI ESBL klebsiella. She is unable to return home, grandmother unable to care for her. Case management has been closely involved.       Interval Problem Update  Last night patient had episode of shortness of breath, chest x-ray showed no acute process, patient was given a breathing treatment without improvement, Ativan was given which resolved her dyspnea.  Patient has had no repeat dyspnea since.  UA yesterday did show numerous WBCs and positive leukocyte esterase will restart Merrem as patient has history of ESBL, cultures pending. Also showed budding yeast, which were not present on previous UA, will start antifungals.   9/4: Laying in bed.  Lethargic.  Easily aroused to stimulus but just quickly back to sleep.  No acute distress noted.  No seizure activity has been noted by staff overnight.  Afebrile.  Hemodynamically stable.  Pending EEG ordered yesterday for presumed seizure activity.  Suprapubic Andrade catheter has been replaced yesterday by IR.  No issues overnight per staff.  9/5: Lethargic.  Awakens to verbal stimulus quickly drifts back to sleep.  Inquiring about palliative team and when they will come and speak with her.  No acute distress noted.  Has been afebrile.  No issues overnight per staff.  9/6: Resting in bed comfortably.  Stated that she feels confused.  Shortly after had a brief episode of what looks like a seizure that was aborted with IV Ativan.  Went into brief postictal phase.  Continues to be lethargic  throughout the day which resulting and poor p.o. intake.  Hemodynamically stable.  Febrile.  No issues overnight per staff.  9/7: Continues to be lethargic.  Poor p.o. fluid intake noted by staff and evidenced by decrease in urine output.  No seizures has been reported since yesterday.  9/8: no acute events overnight, now tolerating diet and oral intake. No seizures noted today or throughout the night. VS stable.   9/9: no acute events overnight, mild erythema around catheter site. Vs stable       Consultants/Specialty  Ophtho Dr. Soliz  Neuro-ophtho Dr. Yousif  Neurology  Code Status  DNAR/DNI    Disposition  grandmother unable to care for her  Longterm SNF pending      I certify that the patient requires continued medically necessary services for the treatment of recurrent urinary tract infections in the presence of a suprapubic catheter and will remain in the hospital for 20 days.  Discharge plan is anticipated to be to skilled nursing facility.    Review of Systems  Review of Systems   Constitutional: Positive for malaise/fatigue.   HENT: Negative for hearing loss.    Eyes: Positive for blurred vision (mild). Negative for double vision, photophobia and pain.   Respiratory: Negative for cough and hemoptysis.    Cardiovascular: Negative for chest pain and palpitations.   Gastrointestinal: Positive for abdominal pain (around cath site). Negative for diarrhea, nausea and vomiting.   Genitourinary: Negative for dysuria and urgency.   Musculoskeletal: Positive for myalgias.        Pain b/l feet    Skin: Negative for rash.   Neurological: Positive for focal weakness (chronic). Negative for dizziness, tingling and headaches.   Psychiatric/Behavioral: Negative for depression and suicidal ideas.        Physical Exam  Temp:  [35.9 °C (96.6 °F)-37.4 °C (99.3 °F)] 37.4 °C (99.3 °F)  Pulse:  [45-63] 63  Resp:  [16-18] 18  BP: ()/(45-63) 97/45  SpO2:  [94 %-98 %] 98 %    Physical Exam  Constitutional:        Appearance: She is obese. She is not toxic-appearing or diaphoretic.      Comments: Appears fatigued   HENT:      Head: Normocephalic and atraumatic.      Mouth/Throat:      Mouth: Mucous membranes are moist.   Eyes:      General:         Right eye: No discharge.         Left eye: No discharge.   Neck:      Musculoskeletal: Neck supple.   Cardiovascular:      Rate and Rhythm: Normal rate and regular rhythm.      Pulses: Normal pulses.   Pulmonary:      Breath sounds: No wheezing, rhonchi or rales.      Comments: Diminished througout  Abdominal:      General: There is no distension.      Tenderness: Tenderness: suprapubic near cath site.      Comments: suprapubic cath in place, no drainage seen, mildly erythematous around tube   Musculoskeletal:         General: Tenderness (b/l feet and ankles ) present.      Right lower leg: No edema.      Left lower leg: No edema.      Comments: Nonpitting edema to legs   Skin:     General: Skin is warm and dry.   Neurological:      Mental Status: She is oriented to person, place, and time and easily aroused. She is lethargic.      Motor: Weakness present.   Psychiatric:         Mood and Affect: Mood is depressed.         Fluids    Intake/Output Summary (Last 24 hours) at 9/9/2020 1153  Last data filed at 9/9/2020 0800  Gross per 24 hour   Intake 240 ml   Output 1900 ml   Net -1660 ml       Laboratory  Recent Labs     09/07/20  0922   WBC 7.3   RBC 4.34   HEMOGLOBIN 12.7   HEMATOCRIT 39.6   MCV 91.2   MCH 29.3   MCHC 32.1*   RDW 44.9   PLATELETCT 170   MPV 12.4     Recent Labs     09/07/20  0922   SODIUM 141   POTASSIUM 3.2*   CHLORIDE 112   CO2 15*   GLUCOSE 129*   BUN 12   CREATININE 0.80   CALCIUM 8.9                   Imaging  CT-HEAD W/O   Final Result      No evidence of acute intracranial process.      IR-DRAINAGE-CATH EXCHANGE   Final Result      Successful image guided suprapubic tube replacement with upsizing.                  DX-CHEST-LIMITED (1 VIEW)   Final Result       Hypoinflation without other evidence for acute cardiopulmonary disease.      IR-US GUIDED PIV   Final Result    Ultrasound-guided PERIPHERAL IV INSERTION performed by    qualified nursing staff as above.      CT-CTA CHEST PULMONARY ARTERY W/ RECONS   Final Result      No CT evidence of acute pulmonary thromboembolism      Mild splenomegaly      New small pleural effusions            US-BLADDER   Final Result         1.  Right ureteral jet is not definitively identified, otherwise unremarkable exam.      DX-SHOULDER 2+ LEFT   Final Result      1.  Normal left shoulder series.      DX-CHEST-PORTABLE (1 VIEW)   Final Result         1.  No acute cardiopulmonary disease.      US-BLADDER   Final Result      Suprapubic catheter balloon is inflated, presumably within the collapsed bladder. If more definitive analysis is required, consider clamping the bladder catheter for  hours prior to repeat ultrasound, allowing the bladder to fill with fluid and therefore    better see the bladder walls      CT-HEAD W/O   Final Result         1.  No acute intracranial abnormality.      DX-WRIST-COMPLETE 3+ RIGHT   Final Result      No acute fracture.  If pain persists, recommend repeat imaging in 7 to 10 days.      EC-ECHOCARDIOGRAM COMPLETE W/ CONT   Final Result      CT-CTA NECK WITH & W/O-POST PROCESSING   Final Result      No high-grade stenosis, large vessel occlusion, aneurysm or dissection.      CT-CTA HEAD WITH & W/O-POST PROCESS   Final Result      No intracranial aneurysm, focal stenosis, or abrupt large vessel cut off.      US-CAROTID DOPPLER BILAT   Final Result      CT-HEAD W/O   Final Result      No CT evidence of acute infarct, hemorrhage or mass.           Assessment/Plan  * Bacteriuria- (present on admission)  Assessment & Plan  Likely colonized urinary tract given history of indwelling suprapubic catheter  ESBL klebsiella--> IV meropenem x 7 days completed on 8/26  UA 9/2/2020 shows budding yeast, and new UTI  9/4:  Repeated UA was done prior of Andrade catheter exchange which likely will be positive due to colonization.  There was budding yeast on UA which also considered as colonization.  However, patient was started on Merrem again which will be discontinued.  Will only complete 5 days course of Diflucan since repeat urine culture was not obtained.    Pending LTACH placement    Optic neuritis- (present on admission)  Assessment & Plan  Continue outpatient prednisone   ophthalmology consulted, Dr. Aparicio.. signed off  continue home Cellcept   cta head and neck--WNL  Echo.  Within normal limits  hypercoag w./u.. Unremarkable except for a mild elevation of homocystine  No candidate for MRI due to bladder stimulator         Diabetes mellitus type 2 in obese (HCC)- (present on admission)  Assessment & Plan  Lantus decreased to 8U qhs  Follow-up A1c is 5  Glucose well controlled  9/7: Noted to have normal low blood glucoses.  Likely due to poor p.o. intake.  Will monitor for today and consider reducing/stopping long acting insulin and initiate sliding scale.    Presence of suprapubic catheter ([facility])- (present on admission)  Assessment & Plan  Was placed 8/6/2020  Intermittent hematuria. Bladder scans have been umremarkable. Hematuria improved after stopping lovenox dvt ppx.  8/26 - spoke with Dr. Galicia, would wait 1 month to exchange cath, ok to change with IR after 1 more week, recommends upsizing  Placed IR order for suprapubic catheter exchange per urology recommendations  9/4: Suprapubic cath exchanged by IR on 9/3.   9/9: erythema round suprapubic cath site, no drainge no signs of acute infection    Morbidly obese (HCC)- (present on admission)  Assessment & Plan  BMI 44.2 kilograms per square meter    Poor fluid intake- (present on admission)  Assessment & Plan  Encourage PO intake     Seizure (HCC)- (present on admission)  Assessment & Plan  9/4: Noted to have 2 seizure activities on the floor by nursing staff followed by  pos ictal state.  CT scan of the head was ordered which was negative for any acute intracranial findings.  EEG has been ordered as well.  Neurology consulted.  PRN Ativan for breakthrough seizures.  9/5: EEG was unremarkable.  Neurology not recommending any AED.  9/6: Had another seizure this morning that was aborted by Ativan.  Could be possibly psychogenic concerning that patient has conversion disorder and other psychological issues.  At this point psychiatry has no further recommendations.  We will continue to monitor and if patient develop another seizure, will consider continuous video EEG.    No repeat seizure activity may need VEEG if repeat activity       Wrist injury, right, initial encounter  Assessment & Plan  Xray negative for fracture    Pain control    Dry eyes- (present on admission)  Assessment & Plan  Eye lubrication     Pain in both feet- (present on admission)  Assessment & Plan  Pain control with po oxycodone        Idiopathic intracranial hypertension- (present on admission)  Assessment & Plan  History of  She is followed by Dr. Yousif, neuro-ophthalmology   continue home Diamox  Supportive care as suggested by neuro-ophthalmology    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HCC)- (present on admission)  Assessment & Plan  Stable at baseline nonambulatory state,    Blurred vision  Assessment & Plan  Presumed retinopathy  monitor    Mild intermittent asthma without complication- (present on admission)  Assessment & Plan  Intermittent flares, affected by smoke  RT protocol, albuterol PRN  CXR no acute process  Likely intermittent dyspnea has anxiety component, resolved with ativan      History of atrial fibrillation and cardiomyopathy?- (present on admission)  Assessment & Plan  No events on telemetry, will stop    Schizophrenia ([facility])- (present on admission)  Assessment & Plan  History of schizophrenia and conversion disorder  Continue Geodon and Prozac     Peripheral neuropathy and chornic pain syndrome (CMS-HCC)-  (present on admission)  Assessment & Plan  At risk of morbid obesity hypoventilation, avoid excessive medications limiting respiratory drive.       VTE prophylaxis: scds, hematuria

## 2020-09-09 NOTE — DISCHARGE PLANNING
"Anticipated Discharge Disposition: LTC SNF    Action: Lsw spoke with grandmother Vivienne. Lsw updated Vivienne that SNF referral is still pending and LTC beds do take a bit of time to open up. Vivienne stated that she does not understand why [place] have not worked with pt and they are \"not helping her progress\". Vivienne stated that she cannot take her home until she is able to be walk by herself and be more independent. Lsw asked if pt would be able to dc home if no SNF has accepted, Vivienne stated that she cannot take care of since she is too much work. Lsw stated that if pt does not have a place to go she will have to wait in hospital until there is a bed open or she has a dc plan.    Barriers to Discharge: LTC bed    Plan: Lsw f/u with SNF    "

## 2020-09-09 NOTE — PROGRESS NOTES
"At approx 2000 pt was complaining that her bladder felt full and was worried about retention and stated that \"it was painful.\" This RN palpated bladder to help drainage, irrigated pts bladder with 30mls of sterile saline and was returned. Drainage bag was emptied and urine output was monitored throughout the night, MD notified.  Based on intake and output of 400mls this morning, bladder seems to be draining appropriately  "

## 2020-09-09 NOTE — CARE PLAN
Problem: Urinary Elimination:  Goal: Ability to reestablish a normal urinary elimination pattern will improve  Outcome: PROGRESSING SLOWER THAN EXPECTED  Flowsheets (Taken 9/8/2020 0800 by Arianna Shukla RLESLYE)  Urinary Elimination: Catheter (Document on LDA)  Note: Pt was having difficulty passing urine through the suprapubic catheter, will continue to monitor     Problem: Psychosocial Needs:  Goal: Level of anxiety will decrease  Outcome: PROGRESSING AS EXPECTED  Note: Encouraged pt to voice feelings and listened to pt, will continue to monitor

## 2020-09-09 NOTE — CARE PLAN
Problem: Communication  Goal: The ability to communicate needs accurately and effectively will improve  Outcome: PROGRESSING AS EXPECTED  Note: Patient calls appropriately and involved in discharge planning.      Problem: Safety  Goal: Will remain free from falls  Outcome: PROGRESSING AS EXPECTED  Note: Patient calls appropriately and does not try to exit bed without assistance.

## 2020-09-10 LAB
EKG IMPRESSION: NORMAL
GLUCOSE BLD-MCNC: 150 MG/DL (ref 65–99)
GLUCOSE BLD-MCNC: 76 MG/DL (ref 65–99)
GLUCOSE BLD-MCNC: 78 MG/DL (ref 65–99)

## 2020-09-10 PROCEDURE — 700111 HCHG RX REV CODE 636 W/ 250 OVERRIDE (IP): Performed by: INTERNAL MEDICINE

## 2020-09-10 PROCEDURE — 770020 HCHG ROOM/CARE - TELE (206)

## 2020-09-10 PROCEDURE — A9270 NON-COVERED ITEM OR SERVICE: HCPCS | Performed by: FAMILY MEDICINE

## 2020-09-10 PROCEDURE — A9270 NON-COVERED ITEM OR SERVICE: HCPCS | Performed by: HOSPITALIST

## 2020-09-10 PROCEDURE — 700102 HCHG RX REV CODE 250 W/ 637 OVERRIDE(OP): Performed by: HOSPITALIST

## 2020-09-10 PROCEDURE — 99232 SBSQ HOSP IP/OBS MODERATE 35: CPT | Performed by: HOSPITALIST

## 2020-09-10 PROCEDURE — 700111 HCHG RX REV CODE 636 W/ 250 OVERRIDE (IP): Performed by: HOSPITALIST

## 2020-09-10 PROCEDURE — 700102 HCHG RX REV CODE 250 W/ 637 OVERRIDE(OP): Performed by: FAMILY MEDICINE

## 2020-09-10 PROCEDURE — A9270 NON-COVERED ITEM OR SERVICE: HCPCS | Performed by: INTERNAL MEDICINE

## 2020-09-10 PROCEDURE — 82962 GLUCOSE BLOOD TEST: CPT | Mod: 91

## 2020-09-10 PROCEDURE — 700102 HCHG RX REV CODE 250 W/ 637 OVERRIDE(OP): Performed by: INTERNAL MEDICINE

## 2020-09-10 PROCEDURE — 93010 ELECTROCARDIOGRAM REPORT: CPT | Performed by: INTERNAL MEDICINE

## 2020-09-10 RX ADMIN — SODIUM BICARBONATE 650 MG: 650 TABLET ORAL at 08:14

## 2020-09-10 RX ADMIN — TOPIRAMATE 50 MG: 25 TABLET, FILM COATED ORAL at 04:18

## 2020-09-10 RX ADMIN — INSULIN GLARGINE 8 UNITS: 100 INJECTION, SOLUTION SUBCUTANEOUS at 20:14

## 2020-09-10 RX ADMIN — IVABRADINE 7.5 MG: 7.5 TABLET, FILM COATED ORAL at 18:00

## 2020-09-10 RX ADMIN — GABAPENTIN 300 MG: 300 CAPSULE ORAL at 18:03

## 2020-09-10 RX ADMIN — FLUOXETINE 40 MG: 20 CAPSULE ORAL at 04:18

## 2020-09-10 RX ADMIN — ACETAZOLAMIDE 1000 MG: 500 CAPSULE, EXTENDED RELEASE ORAL at 18:03

## 2020-09-10 RX ADMIN — PREGABALIN 300 MG: 150 CAPSULE ORAL at 18:03

## 2020-09-10 RX ADMIN — PREDNISONE 10 MG: 10 TABLET ORAL at 04:18

## 2020-09-10 RX ADMIN — MYCOPHENOLATE MOFETIL 1000 MG: 250 CAPSULE ORAL at 05:23

## 2020-09-10 RX ADMIN — OXCARBAZEPINE 150 MG: 150 TABLET, FILM COATED ORAL at 04:18

## 2020-09-10 RX ADMIN — LORAZEPAM 0.5 MG: 2 INJECTION INTRAMUSCULAR; INTRAVENOUS at 23:20

## 2020-09-10 RX ADMIN — METHOCARBAMOL TABLETS 500 MG: 500 TABLET, COATED ORAL at 18:03

## 2020-09-10 RX ADMIN — SODIUM BICARBONATE 650 MG: 650 TABLET ORAL at 20:12

## 2020-09-10 RX ADMIN — GABAPENTIN 300 MG: 300 CAPSULE ORAL at 04:18

## 2020-09-10 RX ADMIN — PREGABALIN 300 MG: 150 CAPSULE ORAL at 04:18

## 2020-09-10 RX ADMIN — ACETAZOLAMIDE 1500 MG: 500 CAPSULE, EXTENDED RELEASE ORAL at 04:18

## 2020-09-10 RX ADMIN — MICONAZOLE NITRATE: 20 CREAM TOPICAL at 04:19

## 2020-09-10 RX ADMIN — LEVOTHYROXINE SODIUM 75 MCG: 0.07 TABLET ORAL at 04:18

## 2020-09-10 RX ADMIN — METHOCARBAMOL TABLETS 500 MG: 500 TABLET, COATED ORAL at 12:48

## 2020-09-10 RX ADMIN — BUTALBITAL, ACETAMINOPHEN, AND CAFFEINE 2 TABLET: 50; 325; 40 TABLET ORAL at 10:23

## 2020-09-10 RX ADMIN — MINERAL OIL, PETROLATUM 1 APPLICATION: 425; 573 OINTMENT OPHTHALMIC at 12:48

## 2020-09-10 RX ADMIN — SODIUM BICARBONATE 650 MG: 650 TABLET ORAL at 15:40

## 2020-09-10 RX ADMIN — ZIPRASIDONE HYDROCHLORIDE 40 MG: 40 CAPSULE ORAL at 08:15

## 2020-09-10 RX ADMIN — OMEPRAZOLE 20 MG: 20 CAPSULE, DELAYED RELEASE ORAL at 04:19

## 2020-09-10 RX ADMIN — TRAZODONE HYDROCHLORIDE 100 MG: 100 TABLET ORAL at 20:12

## 2020-09-10 RX ADMIN — ZIPRASIDONE HYDROCHLORIDE 40 MG: 40 CAPSULE ORAL at 18:00

## 2020-09-10 RX ADMIN — GABAPENTIN 300 MG: 300 CAPSULE ORAL at 12:48

## 2020-09-10 RX ADMIN — METHOCARBAMOL TABLETS 500 MG: 500 TABLET, COATED ORAL at 20:12

## 2020-09-10 RX ADMIN — OXCARBAZEPINE 150 MG: 150 TABLET, FILM COATED ORAL at 18:00

## 2020-09-10 RX ADMIN — IVABRADINE 7.5 MG: 7.5 TABLET, FILM COATED ORAL at 08:15

## 2020-09-10 RX ADMIN — OXYBUTYNIN CHLORIDE 5 MG: 5 TABLET ORAL at 18:03

## 2020-09-10 RX ADMIN — BUSPIRONE HYDROCHLORIDE 10 MG: 10 TABLET ORAL at 08:14

## 2020-09-10 RX ADMIN — MINERAL OIL, PETROLATUM 1 APPLICATION: 425; 573 OINTMENT OPHTHALMIC at 04:19

## 2020-09-10 RX ADMIN — ASPIRIN 81 MG: 81 TABLET, COATED ORAL at 04:19

## 2020-09-10 RX ADMIN — OXYBUTYNIN CHLORIDE 5 MG: 5 TABLET ORAL at 04:18

## 2020-09-10 RX ADMIN — SODIUM BICARBONATE 650 MG: 650 TABLET ORAL at 04:19

## 2020-09-10 RX ADMIN — BUSPIRONE HYDROCHLORIDE 10 MG: 10 TABLET ORAL at 20:12

## 2020-09-10 RX ADMIN — TOPIRAMATE 50 MG: 25 TABLET, FILM COATED ORAL at 18:03

## 2020-09-10 RX ADMIN — MYCOPHENOLATE MOFETIL 1000 MG: 250 CAPSULE ORAL at 18:00

## 2020-09-10 RX ADMIN — MICONAZOLE NITRATE: 20 CREAM TOPICAL at 18:00

## 2020-09-10 RX ADMIN — MINERAL OIL, PETROLATUM 1 APPLICATION: 425; 573 OINTMENT OPHTHALMIC at 18:00

## 2020-09-10 RX ADMIN — METHOCARBAMOL TABLETS 500 MG: 500 TABLET, COATED ORAL at 08:14

## 2020-09-10 RX ADMIN — OXYBUTYNIN CHLORIDE 5 MG: 5 TABLET ORAL at 12:47

## 2020-09-10 ASSESSMENT — ENCOUNTER SYMPTOMS
VOMITING: 0
HEADACHES: 0
PALPITATIONS: 0
TINGLING: 0
COUGH: 0
DEPRESSION: 0
MYALGIAS: 1
NAUSEA: 0
DOUBLE VISION: 0
PHOTOPHOBIA: 0
ABDOMINAL PAIN: 1
EYE PAIN: 0
DIARRHEA: 0
BLURRED VISION: 1
HEMOPTYSIS: 0
FOCAL WEAKNESS: 1
DIZZINESS: 0

## 2020-09-10 ASSESSMENT — PAIN DESCRIPTION - PAIN TYPE: TYPE: ACUTE PAIN

## 2020-09-10 NOTE — CARE PLAN
Problem: Bowel/Gastric:  Goal: Normal bowel function is maintained or improved  Outcome: PROGRESSING SLOWER THAN EXPECTED  Flowsheets (Taken 9/9/2020 2000)  Last BM: 09/09/20  Note: Pt has been having diarrhea, will continue to monitor     Problem: Knowledge Deficit  Goal: Knowledge of disease process/condition, treatment plan, diagnostic tests, and medications will improve  Outcome: PROGRESSING AS EXPECTED  Note: Pt understands Dx and reason for interventions, actively participates in care and asks appropriate questions

## 2020-09-10 NOTE — PROGRESS NOTES
Monitor summary: sinus silas/sinus rhythm rate 48-71, MA .16, QRS .10, QT .32, with rare PACs per strip from monitor room.

## 2020-09-10 NOTE — CARE PLAN
Problem: Communication  Goal: The ability to communicate needs accurately and effectively will improve  Outcome: PROGRESSING AS EXPECTED     Problem: Safety  Goal: Will remain free from injury  Outcome: PROGRESSING AS EXPECTED     Problem: Discharge Barriers/Planning  Goal: Patient's continuum of care needs will be met  Outcome: PROGRESSING SLOWER THAN EXPECTED

## 2020-09-10 NOTE — PROGRESS NOTES
[facility] Medicine Daily Progress Note    Date of Service  9/10/2020     Chief Complaint  29 y.o. female admitted 8/15/2020 with blurred vision.    Hospital Course    PMH transverse myelitis, asthma, chronic relapsing inflammatory optic neuropathy, suprapubic cath, DM who presented with HA and blurred vision. CTA head and neck and TTE were unremarkable. Dr. Yousif was consulted, recommended conservative medication management for her transient monocular visual loss. She also was found with UTI ESBL klebsiella. She is unable to return home, grandmother unable to care for her. Case management has been closely involved.       Interval Problem Update  Last night patient had episode of shortness of breath, chest x-ray showed no acute process, patient was given a breathing treatment without improvement, Ativan was given which resolved her dyspnea.  Patient has had no repeat dyspnea since.  UA yesterday did show numerous WBCs and positive leukocyte esterase will restart Merrem as patient has history of ESBL, cultures pending. Also showed budding yeast, which were not present on previous UA, will start antifungals.   9/4: Laying in bed.  Lethargic.  Easily aroused to stimulus but just quickly back to sleep.  No acute distress noted.  No seizure activity has been noted by staff overnight.  Afebrile.  Hemodynamically stable.  Pending EEG ordered yesterday for presumed seizure activity.  Suprapubic Andrade catheter has been replaced yesterday by IR.  No issues overnight per staff.  9/5: Lethargic.  Awakens to verbal stimulus quickly drifts back to sleep.  Inquiring about palliative team and when they will come and speak with her.  No acute distress noted.  Has been afebrile.  No issues overnight per staff.  9/6: Resting in bed comfortably.  Stated that she feels confused.  Shortly after had a brief episode of what looks like a seizure that was aborted with IV Ativan.  Went into brief postictal phase.  Continues to be lethargic  throughout the day which resulting and poor p.o. intake.  Hemodynamically stable.  Febrile.  No issues overnight per staff.  9/7: Continues to be lethargic.  Poor p.o. fluid intake noted by staff and evidenced by decrease in urine output.  No seizures has been reported since yesterday.  9/8: no acute events overnight, now tolerating diet and oral intake. No seizures noted today or throughout the night. VS stable.   9/9: no acute events overnight, mild erythema around catheter site. Vs stable   9/10: no acute events overnight. Continued pain around catheter site. Patient states irritation and drainage noted. Otherwise remains placement issue.       Consultants/Specialty  Ophtho Dr. Soliz  Neuro-ophtho Dr. Yousif  Neurology  Code Status  DNAR/DNI    Disposition  grandmother unable to care for her  Longterm SNF pending      I certify that the patient requires continued medically necessary services for the treatment of recurrent urinary tract infections in the presence of a suprapubic catheter and will remain in the hospital for 20 days.  Discharge plan is anticipated to be to skilled nursing facility.    Review of Systems  Review of Systems   Constitutional: Positive for malaise/fatigue.   HENT: Negative for hearing loss.    Eyes: Positive for blurred vision (mild). Negative for double vision, photophobia and pain.   Respiratory: Negative for cough and hemoptysis.    Cardiovascular: Negative for chest pain and palpitations.   Gastrointestinal: Positive for abdominal pain (around cath site). Negative for diarrhea, nausea and vomiting.   Genitourinary: Negative for dysuria and urgency.   Musculoskeletal: Positive for myalgias.        Pain b/l feet    Skin: Negative for rash.   Neurological: Positive for focal weakness (chronic). Negative for dizziness, tingling and headaches.   Psychiatric/Behavioral: Negative for depression and suicidal ideas.        Physical Exam  Temp:  [36 °C (96.8 °F)-36.9 °C (98.5 °F)] 36.2 °C  (97.2 °F)  Pulse:  [48-66] 66  Resp:  [16-20] 18  BP: ()/(52-63) 106/63  SpO2:  [93 %-97 %] 97 %    Physical Exam  Constitutional:       Appearance: She is obese. She is not toxic-appearing or diaphoretic.      Comments: Appears fatigued   HENT:      Head: Normocephalic and atraumatic.      Mouth/Throat:      Mouth: Mucous membranes are moist.   Eyes:      General:         Right eye: No discharge.         Left eye: No discharge.   Neck:      Musculoskeletal: Neck supple.   Cardiovascular:      Rate and Rhythm: Normal rate and regular rhythm.      Pulses: Normal pulses.   Pulmonary:      Breath sounds: No wheezing, rhonchi or rales.      Comments: Diminished througout  Abdominal:      General: There is no distension.      Tenderness: Tenderness: suprapubic near cath site.      Comments: suprapubic cath in place, no drainage seen, mildly erythematous around tube   Musculoskeletal:         General: Tenderness (b/l feet and ankles ) present.      Right lower leg: No edema.      Left lower leg: No edema.      Comments: Nonpitting edema to legs   Skin:     General: Skin is warm and dry.   Neurological:      Mental Status: She is oriented to person, place, and time and easily aroused. She is lethargic.      Motor: Weakness present.   Psychiatric:         Mood and Affect: Mood is depressed.         Fluids    Intake/Output Summary (Last 24 hours) at 9/10/2020 1044  Last data filed at 9/10/2020 0441  Gross per 24 hour   Intake 240 ml   Output 1500 ml   Net -1260 ml       Laboratory                        Imaging  CT-HEAD W/O   Final Result      No evidence of acute intracranial process.      IR-DRAINAGE-CATH EXCHANGE   Final Result      Successful image guided suprapubic tube replacement with upsizing.                  DX-CHEST-LIMITED (1 VIEW)   Final Result      Hypoinflation without other evidence for acute cardiopulmonary disease.      IR-US GUIDED PIV   Final Result    Ultrasound-guided PERIPHERAL IV INSERTION  performed by    qualified nursing staff as above.      CT-CTA CHEST PULMONARY ARTERY W/ RECONS   Final Result      No CT evidence of acute pulmonary thromboembolism      Mild splenomegaly      New small pleural effusions            US-BLADDER   Final Result         1.  Right ureteral jet is not definitively identified, otherwise unremarkable exam.      DX-SHOULDER 2+ LEFT   Final Result      1.  Normal left shoulder series.      DX-CHEST-PORTABLE (1 VIEW)   Final Result         1.  No acute cardiopulmonary disease.      US-BLADDER   Final Result      Suprapubic catheter balloon is inflated, presumably within the collapsed bladder. If more definitive analysis is required, consider clamping the bladder catheter for  hours prior to repeat ultrasound, allowing the bladder to fill with fluid and therefore    better see the bladder walls      CT-HEAD W/O   Final Result         1.  No acute intracranial abnormality.      DX-WRIST-COMPLETE 3+ RIGHT   Final Result      No acute fracture.  If pain persists, recommend repeat imaging in 7 to 10 days.      EC-ECHOCARDIOGRAM COMPLETE W/ CONT   Final Result      CT-CTA NECK WITH & W/O-POST PROCESSING   Final Result      No high-grade stenosis, large vessel occlusion, aneurysm or dissection.      CT-CTA HEAD WITH & W/O-POST PROCESS   Final Result      No intracranial aneurysm, focal stenosis, or abrupt large vessel cut off.      US-CAROTID DOPPLER BILAT   Final Result      CT-HEAD W/O   Final Result      No CT evidence of acute infarct, hemorrhage or mass.           Assessment/Plan  * Bacteriuria- (present on admission)  Assessment & Plan  Likely colonized urinary tract given history of indwelling suprapubic catheter  ESBL klebsiella--> IV meropenem x 7 days completed on 8/26  UA 9/2/2020 shows budding yeast, and new UTI  9/4: Repeated UA was done prior of Andrade catheter exchange which likely will be positive due to colonization.  There was budding yeast on UA which also considered  as colonization.  However, patient was started on Merrem again which will be discontinued.  Will only complete 5 days course of Diflucan since repeat urine culture was not obtained.    Pending LTACH placement    Optic neuritis- (present on admission)  Assessment & Plan  Continue outpatient prednisone   ophthalmology consulted, Dr. Aparicio.. signed off  continue home Cellcept   cta head and neck--WNL  Echo.  Within normal limits  hypercoag w./u.. Unremarkable except for a mild elevation of homocystine  No candidate for MRI due to bladder stimulator         Diabetes mellitus type 2 in obese (HCC)- (present on admission)  Assessment & Plan  Lantus decreased to 8U qhs  Follow-up A1c is 5  Glucose well controlled  9/7: Noted to have normal low blood glucoses.  Likely due to poor p.o. intake.  Will monitor for today and consider reducing/stopping long acting insulin and initiate sliding scale.    Presence of suprapubic catheter (HCC)- (present on admission)  Assessment & Plan  Was placed 8/6/2020  Intermittent hematuria. Bladder scans have been umremarkable. Hematuria improved after stopping lovenox dvt ppx.  8/26 - spoke with Dr. Galicia, would wait 1 month to exchange cath, ok to change with IR after 1 more week, recommends upsizing  Placed IR order for suprapubic catheter exchange per urology recommendations  9/4: Suprapubic cath exchanged by IR on 9/3.   9/9: erythema round suprapubic cath site, no drainge no signs of acute infection  9/10: continued erythema and irritation. Cath site without drainge. Continue to ICE and symptomatic management.     Morbidly obese (HCC)- (present on admission)  Assessment & Plan  BMI 44.2 kilograms per square meter    Poor fluid intake- (present on admission)  Assessment & Plan  Encourage PO intake     Seizure (HCC)- (present on admission)  Assessment & Plan  9/4: Noted to have 2 seizure activities on the floor by nursing staff followed by pos ictal state.  CT scan of the head was  ordered which was negative for any acute intracranial findings.  EEG has been ordered as well.  Neurology consulted.  PRN Ativan for breakthrough seizures.  9/5: EEG was unremarkable.  Neurology not recommending any AED.  9/6: Had another seizure this morning that was aborted by Ativan.  Could be possibly psychogenic concerning that patient has conversion disorder and other psychological issues.  At this point psychiatry has no further recommendations.  We will continue to monitor and if patient develop another seizure, will consider continuous video EEG.    No repeat seizure activity may need VEEG if repeat activity       Wrist injury, right, initial encounter  Assessment & Plan  Xray negative for fracture    Pain control    Dry eyes- (present on admission)  Assessment & Plan  Eye lubrication     Pain in both feet- (present on admission)  Assessment & Plan  Pain control with po oxycodone        Idiopathic intracranial hypertension- (present on admission)  Assessment & Plan  History of  She is followed by Dr. Yousif, neuro-ophthalmology   continue home Diamox  Supportive care as suggested by neuro-ophthalmology    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HCC)- (present on admission)  Assessment & Plan  Stable at baseline nonambulatory state,    Blurred vision  Assessment & Plan  Presumed retinopathy  monitor    Mild intermittent asthma without complication- (present on admission)  Assessment & Plan  Intermittent flares, affected by smoke  RT protocol, albuterol PRN  CXR no acute process  Likely intermittent dyspnea has anxiety component, resolved with ativan      History of atrial fibrillation and cardiomyopathy?- (present on admission)  Assessment & Plan  No events on telemetry, will stop    Schizophrenia ([facility])- (present on admission)  Assessment & Plan  History of schizophrenia and conversion disorder  Continue Geodon and Prozac     Peripheral neuropathy and chornic pain syndrome ([facility])- (present on admission)  Assessment &  Plan  At risk of morbid obesity hypoventilation, avoid excessive medications limiting respiratory drive.       VTE prophylaxis: scds, hematuria

## 2020-09-10 NOTE — PROGRESS NOTES
Monitor summary: SB-SR 47-63, RI 0.16, QRS 0.12, QT 0.48, with rare PVCs per strip from monitor room.

## 2020-09-11 LAB
GLUCOSE BLD-MCNC: 113 MG/DL (ref 65–99)
GLUCOSE BLD-MCNC: 149 MG/DL (ref 65–99)
GLUCOSE BLD-MCNC: 90 MG/DL (ref 65–99)
GLUCOSE BLD-MCNC: 91 MG/DL (ref 65–99)

## 2020-09-11 PROCEDURE — 700102 HCHG RX REV CODE 250 W/ 637 OVERRIDE(OP): Performed by: HOSPITALIST

## 2020-09-11 PROCEDURE — 700102 HCHG RX REV CODE 250 W/ 637 OVERRIDE(OP): Performed by: FAMILY MEDICINE

## 2020-09-11 PROCEDURE — A9270 NON-COVERED ITEM OR SERVICE: HCPCS | Performed by: HOSPITALIST

## 2020-09-11 PROCEDURE — 700102 HCHG RX REV CODE 250 W/ 637 OVERRIDE(OP): Performed by: INTERNAL MEDICINE

## 2020-09-11 PROCEDURE — A9270 NON-COVERED ITEM OR SERVICE: HCPCS | Performed by: INTERNAL MEDICINE

## 2020-09-11 PROCEDURE — A9270 NON-COVERED ITEM OR SERVICE: HCPCS | Performed by: FAMILY MEDICINE

## 2020-09-11 PROCEDURE — 700111 HCHG RX REV CODE 636 W/ 250 OVERRIDE (IP): Performed by: INTERNAL MEDICINE

## 2020-09-11 PROCEDURE — 770020 HCHG ROOM/CARE - TELE (206)

## 2020-09-11 PROCEDURE — 99232 SBSQ HOSP IP/OBS MODERATE 35: CPT | Performed by: HOSPITALIST

## 2020-09-11 PROCEDURE — 700111 HCHG RX REV CODE 636 W/ 250 OVERRIDE (IP): Performed by: HOSPITALIST

## 2020-09-11 PROCEDURE — 82962 GLUCOSE BLOOD TEST: CPT | Mod: 91

## 2020-09-11 RX ADMIN — METHOCARBAMOL TABLETS 500 MG: 500 TABLET, COATED ORAL at 16:04

## 2020-09-11 RX ADMIN — ZIPRASIDONE HYDROCHLORIDE 40 MG: 40 CAPSULE ORAL at 18:11

## 2020-09-11 RX ADMIN — OMEPRAZOLE 20 MG: 20 CAPSULE, DELAYED RELEASE ORAL at 05:22

## 2020-09-11 RX ADMIN — ASPIRIN 81 MG: 81 TABLET, COATED ORAL at 05:22

## 2020-09-11 RX ADMIN — OXYBUTYNIN CHLORIDE 5 MG: 5 TABLET ORAL at 13:20

## 2020-09-11 RX ADMIN — ACETAZOLAMIDE 1000 MG: 500 CAPSULE, EXTENDED RELEASE ORAL at 18:15

## 2020-09-11 RX ADMIN — MINERAL OIL, PETROLATUM 1 APPLICATION: 425; 573 OINTMENT OPHTHALMIC at 05:23

## 2020-09-11 RX ADMIN — GABAPENTIN 300 MG: 300 CAPSULE ORAL at 05:22

## 2020-09-11 RX ADMIN — PREGABALIN 300 MG: 150 CAPSULE ORAL at 18:10

## 2020-09-11 RX ADMIN — OXCARBAZEPINE 150 MG: 150 TABLET, FILM COATED ORAL at 05:22

## 2020-09-11 RX ADMIN — FLUOXETINE 40 MG: 20 CAPSULE ORAL at 05:21

## 2020-09-11 RX ADMIN — SODIUM BICARBONATE 650 MG: 650 TABLET ORAL at 16:04

## 2020-09-11 RX ADMIN — MINERAL OIL, PETROLATUM 1 APPLICATION: 425; 573 OINTMENT OPHTHALMIC at 18:10

## 2020-09-11 RX ADMIN — TRAZODONE HYDROCHLORIDE 100 MG: 100 TABLET ORAL at 21:27

## 2020-09-11 RX ADMIN — MICONAZOLE NITRATE: 20 CREAM TOPICAL at 05:22

## 2020-09-11 RX ADMIN — MINERAL OIL, PETROLATUM 1 APPLICATION: 425; 573 OINTMENT OPHTHALMIC at 12:00

## 2020-09-11 RX ADMIN — METHOCARBAMOL TABLETS 500 MG: 500 TABLET, COATED ORAL at 21:26

## 2020-09-11 RX ADMIN — MICONAZOLE NITRATE: 20 CREAM TOPICAL at 18:10

## 2020-09-11 RX ADMIN — LEVOTHYROXINE SODIUM 75 MCG: 0.07 TABLET ORAL at 05:22

## 2020-09-11 RX ADMIN — IBUPROFEN 600 MG: 600 TABLET, FILM COATED ORAL at 10:08

## 2020-09-11 RX ADMIN — BUTALBITAL, ACETAMINOPHEN, AND CAFFEINE 1 TABLET: 50; 325; 40 TABLET ORAL at 08:33

## 2020-09-11 RX ADMIN — TOPIRAMATE 50 MG: 25 TABLET, FILM COATED ORAL at 21:26

## 2020-09-11 RX ADMIN — TOPIRAMATE 50 MG: 25 TABLET, FILM COATED ORAL at 05:22

## 2020-09-11 RX ADMIN — SODIUM BICARBONATE 650 MG: 650 TABLET ORAL at 05:22

## 2020-09-11 RX ADMIN — METHOCARBAMOL TABLETS 500 MG: 500 TABLET, COATED ORAL at 13:20

## 2020-09-11 RX ADMIN — OXYCODONE 5 MG: 5 TABLET ORAL at 11:23

## 2020-09-11 RX ADMIN — MYCOPHENOLATE MOFETIL 1000 MG: 250 CAPSULE ORAL at 18:12

## 2020-09-11 RX ADMIN — IVABRADINE 7.5 MG: 7.5 TABLET, FILM COATED ORAL at 08:25

## 2020-09-11 RX ADMIN — DOCUSATE SODIUM 100 MG: 100 CAPSULE, LIQUID FILLED ORAL at 18:10

## 2020-09-11 RX ADMIN — GABAPENTIN 300 MG: 300 CAPSULE ORAL at 13:20

## 2020-09-11 RX ADMIN — PREDNISONE 10 MG: 10 TABLET ORAL at 05:22

## 2020-09-11 RX ADMIN — BUSPIRONE HYDROCHLORIDE 10 MG: 10 TABLET ORAL at 08:20

## 2020-09-11 RX ADMIN — GABAPENTIN 300 MG: 300 CAPSULE ORAL at 18:10

## 2020-09-11 RX ADMIN — OXCARBAZEPINE 150 MG: 150 TABLET, FILM COATED ORAL at 18:09

## 2020-09-11 RX ADMIN — OXYCODONE 5 MG: 5 TABLET ORAL at 21:27

## 2020-09-11 RX ADMIN — METHOCARBAMOL TABLETS 500 MG: 500 TABLET, COATED ORAL at 08:20

## 2020-09-11 RX ADMIN — BUSPIRONE HYDROCHLORIDE 10 MG: 10 TABLET ORAL at 21:27

## 2020-09-11 RX ADMIN — INSULIN GLARGINE 8 UNITS: 100 INJECTION, SOLUTION SUBCUTANEOUS at 18:43

## 2020-09-11 RX ADMIN — IVABRADINE 7.5 MG: 7.5 TABLET, FILM COATED ORAL at 18:09

## 2020-09-11 RX ADMIN — ZIPRASIDONE HYDROCHLORIDE 40 MG: 40 CAPSULE ORAL at 08:25

## 2020-09-11 RX ADMIN — OXYBUTYNIN CHLORIDE 5 MG: 5 TABLET ORAL at 18:10

## 2020-09-11 RX ADMIN — BUTALBITAL, ACETAMINOPHEN, AND CAFFEINE 1 TABLET: 50; 325; 40 TABLET ORAL at 16:32

## 2020-09-11 RX ADMIN — ACETAZOLAMIDE 1500 MG: 500 CAPSULE, EXTENDED RELEASE ORAL at 05:21

## 2020-09-11 RX ADMIN — SODIUM BICARBONATE 650 MG: 650 TABLET ORAL at 08:20

## 2020-09-11 RX ADMIN — SODIUM BICARBONATE 650 MG: 650 TABLET ORAL at 21:27

## 2020-09-11 RX ADMIN — MYCOPHENOLATE MOFETIL 1000 MG: 250 CAPSULE ORAL at 05:21

## 2020-09-11 RX ADMIN — OXYBUTYNIN CHLORIDE 5 MG: 5 TABLET ORAL at 05:22

## 2020-09-11 RX ADMIN — PREGABALIN 300 MG: 150 CAPSULE ORAL at 05:21

## 2020-09-11 ASSESSMENT — ENCOUNTER SYMPTOMS
MYALGIAS: 1
HEMOPTYSIS: 0
DOUBLE VISION: 0
NAUSEA: 0
DIARRHEA: 0
COUGH: 0
FOCAL WEAKNESS: 1
PALPITATIONS: 0
DIZZINESS: 0
EYE PAIN: 0
TINGLING: 0
ABDOMINAL PAIN: 1
DEPRESSION: 0
VOMITING: 0
PHOTOPHOBIA: 0
HEADACHES: 0
BLURRED VISION: 1

## 2020-09-11 ASSESSMENT — PAIN DESCRIPTION - PAIN TYPE
TYPE: ACUTE PAIN

## 2020-09-11 NOTE — PROGRESS NOTES
[facility] Medicine Daily Progress Note    Date of Service  9/11/2020     Chief Complaint  29 y.o. female admitted 8/15/2020 with blurred vision.    Hospital Course    PMH transverse myelitis, asthma, chronic relapsing inflammatory optic neuropathy, suprapubic cath, DM who presented with HA and blurred vision. CTA head and neck and TTE were unremarkable. Dr. Yousif was consulted, recommended conservative medication management for her transient monocular visual loss. She also was found with UTI ESBL klebsiella. She is unable to return home, grandmother unable to care for her. Case management has been closely involved.       Interval Problem Update  Last night patient had episode of shortness of breath, chest x-ray showed no acute process, patient was given a breathing treatment without improvement, Ativan was given which resolved her dyspnea.  Patient has had no repeat dyspnea since.  UA yesterday did show numerous WBCs and positive leukocyte esterase will restart Merrem as patient has history of ESBL, cultures pending. Also showed budding yeast, which were not present on previous UA, will start antifungals.   9/4: Laying in bed.  Lethargic.  Easily aroused to stimulus but just quickly back to sleep.  No acute distress noted.  No seizure activity has been noted by staff overnight.  Afebrile.  Hemodynamically stable.  Pending EEG ordered yesterday for presumed seizure activity.  Suprapubic Andrade catheter has been replaced yesterday by IR.  No issues overnight per staff.  9/5: Lethargic.  Awakens to verbal stimulus quickly drifts back to sleep.  Inquiring about palliative team and when they will come and speak with her.  No acute distress noted.  Has been afebrile.  No issues overnight per staff.  9/6: Resting in bed comfortably.  Stated that she feels confused.  Shortly after had a brief episode of what looks like a seizure that was aborted with IV Ativan.  Went into brief postictal phase.  Continues to be lethargic  throughout the day which resulting and poor p.o. intake.  Hemodynamically stable.  Febrile.  No issues overnight per staff.  9/7: Continues to be lethargic.  Poor p.o. fluid intake noted by staff and evidenced by decrease in urine output.  No seizures has been reported since yesterday.  9/8: no acute events overnight, now tolerating diet and oral intake. No seizures noted today or throughout the night. VS stable.   9/9: no acute events overnight, mild erythema around catheter site. Vs stable   9/10: no acute events overnight. Continued pain around catheter site. Patient states irritation and drainage noted. Otherwise remains placement issue.   9/11: no acute events. VS stable. Pending placement        Consultants/Specialty  Ophtho Dr. Soliz  Neuro-ophtho Dr. Yousif  Neurology  Code Status  DNAR/DNI    Disposition  grandmother unable to care for her  Longterm SNF pending      I certify that the patient requires continued medically necessary services for the treatment of recurrent urinary tract infections in the presence of a suprapubic catheter and will remain in the hospital for 20 days.  Discharge plan is anticipated to be to skilled nursing facility.    Review of Systems  Review of Systems   Constitutional: Positive for malaise/fatigue.   HENT: Negative for hearing loss.    Eyes: Positive for blurred vision (mild). Negative for double vision, photophobia and pain.   Respiratory: Negative for cough and hemoptysis.    Cardiovascular: Negative for chest pain and palpitations.   Gastrointestinal: Positive for abdominal pain (around cath site). Negative for diarrhea, nausea and vomiting.   Genitourinary: Negative for dysuria and urgency.   Musculoskeletal: Positive for myalgias.        Pain b/l feet    Skin: Negative for rash.   Neurological: Positive for focal weakness (chronic). Negative for dizziness, tingling and headaches.   Psychiatric/Behavioral: Negative for depression and suicidal ideas.        Physical  Exam  Temp:  [36.1 °C (97 °F)-36.9 °C (98.5 °F)] 36.9 °C (98.5 °F)  Pulse:  [51-85] 68  Resp:  [16-20] 20  BP: ()/(53-76) 105/64  SpO2:  [95 %-97 %] 96 %    Physical Exam  Constitutional:       Appearance: She is obese. She is not toxic-appearing or diaphoretic.      Comments: Appears fatigued   HENT:      Head: Normocephalic and atraumatic.      Mouth/Throat:      Mouth: Mucous membranes are moist.   Eyes:      General:         Right eye: No discharge.         Left eye: No discharge.   Neck:      Musculoskeletal: Neck supple.   Cardiovascular:      Rate and Rhythm: Normal rate and regular rhythm.      Pulses: Normal pulses.   Pulmonary:      Breath sounds: No wheezing, rhonchi or rales.      Comments: Diminished througout  Abdominal:      General: There is no distension.      Tenderness: Tenderness: suprapubic near cath site.      Comments: suprapubic cath in place, no drainage seen, mildly erythematous around tube   Musculoskeletal:         General: Tenderness (b/l feet and ankles ) present.      Right lower leg: No edema.      Left lower leg: No edema.      Comments: Nonpitting edema to legs   Skin:     General: Skin is warm and dry.   Neurological:      Mental Status: She is oriented to person, place, and time and easily aroused. She is lethargic.      Motor: Weakness present.   Psychiatric:         Mood and Affect: Mood is depressed.         Fluids    Intake/Output Summary (Last 24 hours) at 9/11/2020 1150  Last data filed at 9/10/2020 2000  Gross per 24 hour   Intake --   Output 700 ml   Net -700 ml       Laboratory                        Imaging  CT-HEAD W/O   Final Result      No evidence of acute intracranial process.      IR-DRAINAGE-CATH EXCHANGE   Final Result      Successful image guided suprapubic tube replacement with upsizing.                  DX-CHEST-LIMITED (1 VIEW)   Final Result      Hypoinflation without other evidence for acute cardiopulmonary disease.      IR-US GUIDED PIV   Final  Result    Ultrasound-guided PERIPHERAL IV INSERTION performed by    qualified nursing staff as above.      CT-CTA CHEST PULMONARY ARTERY W/ RECONS   Final Result      No CT evidence of acute pulmonary thromboembolism      Mild splenomegaly      New small pleural effusions            US-BLADDER   Final Result         1.  Right ureteral jet is not definitively identified, otherwise unremarkable exam.      DX-SHOULDER 2+ LEFT   Final Result      1.  Normal left shoulder series.      DX-CHEST-PORTABLE (1 VIEW)   Final Result         1.  No acute cardiopulmonary disease.      US-BLADDER   Final Result      Suprapubic catheter balloon is inflated, presumably within the collapsed bladder. If more definitive analysis is required, consider clamping the bladder catheter for  hours prior to repeat ultrasound, allowing the bladder to fill with fluid and therefore    better see the bladder walls      CT-HEAD W/O   Final Result         1.  No acute intracranial abnormality.      DX-WRIST-COMPLETE 3+ RIGHT   Final Result      No acute fracture.  If pain persists, recommend repeat imaging in 7 to 10 days.      EC-ECHOCARDIOGRAM COMPLETE W/ CONT   Final Result      CT-CTA NECK WITH & W/O-POST PROCESSING   Final Result      No high-grade stenosis, large vessel occlusion, aneurysm or dissection.      CT-CTA HEAD WITH & W/O-POST PROCESS   Final Result      No intracranial aneurysm, focal stenosis, or abrupt large vessel cut off.      US-CAROTID DOPPLER BILAT   Final Result      CT-HEAD W/O   Final Result      No CT evidence of acute infarct, hemorrhage or mass.           Assessment/Plan  * Bacteriuria- (present on admission)  Assessment & Plan  Likely colonized urinary tract given history of indwelling suprapubic catheter  ESBL klebsiella--> IV meropenem x 7 days completed on 8/26  UA 9/2/2020 shows budding yeast, and new UTI  9/4: Repeated UA was done prior of Andrade catheter exchange which likely will be positive due to colonization.   There was budding yeast on UA which also considered as colonization.  However, patient was started on Merrem again which will be discontinued.  Will only complete 5 days course of Diflucan since repeat urine culture was not obtained.    Pending LTACH placement    Optic neuritis- (present on admission)  Assessment & Plan  Continue outpatient prednisone   ophthalmology consulted, Dr. Aparicio.. signed off  continue home Cellcept   cta head and neck--WNL  Echo.  Within normal limits  hypercoag w./u.. Unremarkable except for a mild elevation of homocystine  No candidate for MRI due to bladder stimulator         Diabetes mellitus type 2 in obese (HCC)- (present on admission)  Assessment & Plan  Lantus decreased to 8U qhs  Follow-up A1c is 5  Glucose well controlled  9/7: Noted to have normal low blood glucoses.  Likely due to poor p.o. intake.  Will monitor for today and consider reducing/stopping long acting insulin and initiate sliding scale.    Presence of suprapubic catheter ([facility])- (present on admission)  Assessment & Plan  Was placed 8/6/2020  Intermittent hematuria. Bladder scans have been umremarkable. Hematuria improved after stopping lovenox dvt ppx.  8/26 - spoke with Dr. Galicia, would wait 1 month to exchange cath, ok to change with IR after 1 more week, recommends upsizing  Placed IR order for suprapubic catheter exchange per urology recommendations  9/4: Suprapubic cath exchanged by IR on 9/3.   9/9: erythema round suprapubic cath site, no drainge no signs of acute infection  9/10: continued erythema and irritation. Cath site without drainge. Continue to ICE and symptomatic management.     Morbidly obese (HCC)- (present on admission)  Assessment & Plan  BMI 44.2 kilograms per square meter    Poor fluid intake- (present on admission)  Assessment & Plan  Encourage PO intake     Seizure (HCC)- (present on admission)  Assessment & Plan  9/4: Noted to have 2 seizure activities on the floor by nursing staff  followed by pos ictal state.  CT scan of the head was ordered which was negative for any acute intracranial findings.  EEG has been ordered as well.  Neurology consulted.  PRN Ativan for breakthrough seizures.  9/5: EEG was unremarkable.  Neurology not recommending any AED.  9/6: Had another seizure this morning that was aborted by Ativan.  Could be possibly psychogenic concerning that patient has conversion disorder and other psychological issues.  At this point psychiatry has no further recommendations.  We will continue to monitor and if patient develop another seizure, will consider continuous video EEG.    No repeat seizure activity may need VEEG if repeat activity       Wrist injury, right, initial encounter  Assessment & Plan  Xray negative for fracture    Pain control    Dry eyes- (present on admission)  Assessment & Plan  Eye lubrication     Pain in both feet- (present on admission)  Assessment & Plan  Pain control with po oxycodone        Idiopathic intracranial hypertension- (present on admission)  Assessment & Plan  History of  She is followed by Dr. Yousif, neuro-ophthalmology   continue home Diamox  Supportive care as suggested by neuro-ophthalmology    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HCC)- (present on admission)  Assessment & Plan  Stable at baseline nonambulatory state,    Blurred vision  Assessment & Plan  Presumed retinopathy  monitor    Mild intermittent asthma without complication- (present on admission)  Assessment & Plan  Intermittent flares, affected by smoke  RT protocol, albuterol PRN  CXR no acute process  Likely intermittent dyspnea has anxiety component, resolved with ativan      History of atrial fibrillation and cardiomyopathy?- (present on admission)  Assessment & Plan  No events on telemetry, will stop    Schizophrenia (HCC)- (present on admission)  Assessment & Plan  History of schizophrenia and conversion disorder  Continue Geodon and Prozac     Peripheral neuropathy and chornic pain  syndrome (CMS-HCC)- (present on admission)  Assessment & Plan  At risk of morbid obesity hypoventilation, avoid excessive medications limiting respiratory drive.       VTE prophylaxis: scds, hematuria

## 2020-09-11 NOTE — CARE PLAN
Problem: Psychosocial Needs:  Goal: Level of anxiety will decrease  Outcome: PROGRESSING AS EXPECTED  Note: 1:1 time spent w/ pt, needs encouragement throughout day.      Problem: Safety  Goal: Will remain free from injury  Outcome: PROGRESSING SLOWER THAN EXPECTED  Goal: Will remain free from falls  Outcome: PROGRESSING SLOWER THAN EXPECTED  Note: Pt needs to be reminded about all precautions. Bed low and locked. Bed alarm and chair alarm both now in use. Lap belt on when in wheelchair. Walker out of sight. Reinforced call light multiple times. Able to demonstrate use back to RN. Frequent checks by RN. Close to RN station.      Problem: Mobility  Goal: Risk for activity intolerance will decrease  Outcome: PROGRESSING SLOWER THAN EXPECTED  Note: Needs encouragement to get OOB. OOB to W.C x2 this shift. Able to self propel in wheelchair in the hallway. Ambulated about 25 ft w/ RN and PT in hallway w/  R.W.

## 2020-09-11 NOTE — PROGRESS NOTES
"This RN was rounding on pt and pt noted to be on the ground on both knees, kneeling down. When asked what happened, pt said \"I wanted to get in tripod position\". Pt was saying she was having trouble breathing and that is why she wanted to get in tripod position. Pt a/ox4, all peronsonal belongings, including call light were within reach next to pt prior to fall. Pt was able to get up w/ assistance and sit back up in wheelchair. Pox 96% on RA. No s/s of injury were noted. Pt able to move all extremities. +CSM to all extremities. Pt stated she did not hit her head. Current fall intervention in place: walker was out of sight in the BR, yellow non skid socks were on, fall risk signage outside door, yellow fall risk bracelet on, close to RN station, and hourly rounding. Additional fall precautions that were placed was post fall included lap belt and chair alarm when in the wheelchair.    MD (Dr. Keith), charge RN (boogie), and Nurse manager (rasheed) all made aware of incident. Per MD no further orders.      Post fall Dr. Keith rounded on pt and accessed pt himself, again no further orders.   "

## 2020-09-12 PROCEDURE — A9270 NON-COVERED ITEM OR SERVICE: HCPCS | Performed by: HOSPITALIST

## 2020-09-12 PROCEDURE — 700111 HCHG RX REV CODE 636 W/ 250 OVERRIDE (IP): Performed by: INTERNAL MEDICINE

## 2020-09-12 PROCEDURE — A9270 NON-COVERED ITEM OR SERVICE: HCPCS | Performed by: FAMILY MEDICINE

## 2020-09-12 PROCEDURE — 700102 HCHG RX REV CODE 250 W/ 637 OVERRIDE(OP): Performed by: INTERNAL MEDICINE

## 2020-09-12 PROCEDURE — 700111 HCHG RX REV CODE 636 W/ 250 OVERRIDE (IP): Performed by: HOSPITALIST

## 2020-09-12 PROCEDURE — 700102 HCHG RX REV CODE 250 W/ 637 OVERRIDE(OP): Performed by: HOSPITALIST

## 2020-09-12 PROCEDURE — A9270 NON-COVERED ITEM OR SERVICE: HCPCS | Performed by: INTERNAL MEDICINE

## 2020-09-12 PROCEDURE — 700102 HCHG RX REV CODE 250 W/ 637 OVERRIDE(OP): Performed by: FAMILY MEDICINE

## 2020-09-12 PROCEDURE — 99232 SBSQ HOSP IP/OBS MODERATE 35: CPT | Performed by: HOSPITALIST

## 2020-09-12 PROCEDURE — 700101 HCHG RX REV CODE 250: Performed by: INTERNAL MEDICINE

## 2020-09-12 PROCEDURE — 770020 HCHG ROOM/CARE - TELE (206)

## 2020-09-12 RX ORDER — OXYCODONE HYDROCHLORIDE 10 MG/1
10 TABLET ORAL EVERY 4 HOURS PRN
Status: DISCONTINUED | OUTPATIENT
Start: 2020-09-12 | End: 2020-09-14 | Stop reason: HOSPADM

## 2020-09-12 RX ORDER — SULFAMETHOXAZOLE AND TRIMETHOPRIM 800; 160 MG/1; MG/1
1 TABLET ORAL EVERY 12 HOURS
Status: DISCONTINUED | OUTPATIENT
Start: 2020-09-12 | End: 2020-09-14 | Stop reason: HOSPADM

## 2020-09-12 RX ADMIN — LORAZEPAM 0.5 MG: 2 INJECTION INTRAMUSCULAR; INTRAVENOUS at 00:03

## 2020-09-12 RX ADMIN — TOPIRAMATE 50 MG: 25 TABLET, FILM COATED ORAL at 04:45

## 2020-09-12 RX ADMIN — ACETAZOLAMIDE 1000 MG: 500 CAPSULE, EXTENDED RELEASE ORAL at 16:49

## 2020-09-12 RX ADMIN — LEVOTHYROXINE SODIUM 75 MCG: 0.07 TABLET ORAL at 04:46

## 2020-09-12 RX ADMIN — MINERAL OIL, PETROLATUM 1 APPLICATION: 425; 573 OINTMENT OPHTHALMIC at 10:59

## 2020-09-12 RX ADMIN — ASPIRIN 81 MG: 81 TABLET, COATED ORAL at 04:46

## 2020-09-12 RX ADMIN — PREGABALIN 300 MG: 150 CAPSULE ORAL at 16:49

## 2020-09-12 RX ADMIN — INSULIN GLARGINE 8 UNITS: 100 INJECTION, SOLUTION SUBCUTANEOUS at 18:15

## 2020-09-12 RX ADMIN — ACETAMINOPHEN 500 MG: 500 TABLET ORAL at 00:03

## 2020-09-12 RX ADMIN — BUSPIRONE HYDROCHLORIDE 10 MG: 10 TABLET ORAL at 07:21

## 2020-09-12 RX ADMIN — IVABRADINE 7.5 MG: 7.5 TABLET, FILM COATED ORAL at 18:16

## 2020-09-12 RX ADMIN — OXYBUTYNIN CHLORIDE 5 MG: 5 TABLET ORAL at 11:01

## 2020-09-12 RX ADMIN — IVABRADINE 7.5 MG: 7.5 TABLET, FILM COATED ORAL at 07:22

## 2020-09-12 RX ADMIN — SODIUM BICARBONATE 650 MG: 650 TABLET ORAL at 04:46

## 2020-09-12 RX ADMIN — MINERAL OIL, PETROLATUM 1 APPLICATION: 425; 573 OINTMENT OPHTHALMIC at 16:51

## 2020-09-12 RX ADMIN — FLUOXETINE 40 MG: 20 CAPSULE ORAL at 04:45

## 2020-09-12 RX ADMIN — OXYCODONE 5 MG: 5 TABLET ORAL at 08:57

## 2020-09-12 RX ADMIN — OMEPRAZOLE 20 MG: 20 CAPSULE, DELAYED RELEASE ORAL at 04:46

## 2020-09-12 RX ADMIN — METHOCARBAMOL TABLETS 500 MG: 500 TABLET, COATED ORAL at 08:07

## 2020-09-12 RX ADMIN — GABAPENTIN 300 MG: 300 CAPSULE ORAL at 16:50

## 2020-09-12 RX ADMIN — DOCUSATE SODIUM 50 MG AND SENNOSIDES 8.6 MG 2 TABLET: 8.6; 5 TABLET, FILM COATED ORAL at 04:47

## 2020-09-12 RX ADMIN — SULFAMETHOXAZOLE AND TRIMETHOPRIM 1 TABLET: 800; 160 TABLET ORAL at 16:50

## 2020-09-12 RX ADMIN — ZIPRASIDONE HYDROCHLORIDE 40 MG: 40 CAPSULE ORAL at 16:50

## 2020-09-12 RX ADMIN — ACETAMINOPHEN 500 MG: 500 TABLET ORAL at 08:07

## 2020-09-12 RX ADMIN — ZIPRASIDONE HYDROCHLORIDE 40 MG: 40 CAPSULE ORAL at 07:22

## 2020-09-12 RX ADMIN — METHOCARBAMOL TABLETS 500 MG: 500 TABLET, COATED ORAL at 16:50

## 2020-09-12 RX ADMIN — BUSPIRONE HYDROCHLORIDE 10 MG: 10 TABLET ORAL at 20:11

## 2020-09-12 RX ADMIN — SULFAMETHOXAZOLE AND TRIMETHOPRIM 1 TABLET: 800; 160 TABLET ORAL at 11:58

## 2020-09-12 RX ADMIN — ACETAZOLAMIDE 1500 MG: 500 CAPSULE, EXTENDED RELEASE ORAL at 04:46

## 2020-09-12 RX ADMIN — SODIUM BICARBONATE 650 MG: 650 TABLET ORAL at 20:10

## 2020-09-12 RX ADMIN — MICONAZOLE NITRATE: 20 CREAM TOPICAL at 04:45

## 2020-09-12 RX ADMIN — OXYCODONE HYDROCHLORIDE 10 MG: 10 TABLET ORAL at 18:15

## 2020-09-12 RX ADMIN — OXCARBAZEPINE 150 MG: 150 TABLET, FILM COATED ORAL at 16:49

## 2020-09-12 RX ADMIN — PREDNISONE 10 MG: 10 TABLET ORAL at 04:45

## 2020-09-12 RX ADMIN — DOCUSATE SODIUM 100 MG: 100 CAPSULE, LIQUID FILLED ORAL at 16:50

## 2020-09-12 RX ADMIN — OXYBUTYNIN CHLORIDE 5 MG: 5 TABLET ORAL at 04:47

## 2020-09-12 RX ADMIN — TOPIRAMATE 50 MG: 25 TABLET, FILM COATED ORAL at 16:49

## 2020-09-12 RX ADMIN — LORAZEPAM 0.5 MG: 2 INJECTION INTRAMUSCULAR; INTRAVENOUS at 16:48

## 2020-09-12 RX ADMIN — METHOCARBAMOL TABLETS 500 MG: 500 TABLET, COATED ORAL at 11:01

## 2020-09-12 RX ADMIN — PREGABALIN 300 MG: 150 CAPSULE ORAL at 04:45

## 2020-09-12 RX ADMIN — OXYBUTYNIN CHLORIDE 5 MG: 5 TABLET ORAL at 16:50

## 2020-09-12 RX ADMIN — OXCARBAZEPINE 150 MG: 150 TABLET, FILM COATED ORAL at 04:48

## 2020-09-12 RX ADMIN — METHOCARBAMOL TABLETS 500 MG: 500 TABLET, COATED ORAL at 20:11

## 2020-09-12 RX ADMIN — MICONAZOLE NITRATE: 20 CREAM TOPICAL at 16:51

## 2020-09-12 RX ADMIN — SODIUM BICARBONATE 650 MG: 650 TABLET ORAL at 07:22

## 2020-09-12 RX ADMIN — OXYCODONE HYDROCHLORIDE 10 MG: 10 TABLET ORAL at 12:56

## 2020-09-12 RX ADMIN — BUTALBITAL, ACETAMINOPHEN, AND CAFFEINE 2 TABLET: 50; 325; 40 TABLET ORAL at 20:10

## 2020-09-12 RX ADMIN — MYCOPHENOLATE MOFETIL 1000 MG: 250 CAPSULE ORAL at 16:50

## 2020-09-12 RX ADMIN — SODIUM BICARBONATE 650 MG: 650 TABLET ORAL at 14:58

## 2020-09-12 RX ADMIN — IBUPROFEN 600 MG: 600 TABLET, FILM COATED ORAL at 10:59

## 2020-09-12 RX ADMIN — GABAPENTIN 300 MG: 300 CAPSULE ORAL at 04:46

## 2020-09-12 RX ADMIN — POLYETHYLENE GLYCOL 3350 1 PACKET: 17 POWDER, FOR SOLUTION ORAL at 00:03

## 2020-09-12 RX ADMIN — DOCUSATE SODIUM 100 MG: 100 CAPSULE, LIQUID FILLED ORAL at 04:45

## 2020-09-12 RX ADMIN — SULFAMETHOXAZOLE AND TRIMETHOPRIM 1 TABLET: 800; 160 TABLET ORAL at 12:55

## 2020-09-12 RX ADMIN — OXYCODONE 5 MG: 5 TABLET ORAL at 04:46

## 2020-09-12 RX ADMIN — GABAPENTIN 300 MG: 300 CAPSULE ORAL at 11:00

## 2020-09-12 RX ADMIN — TRAZODONE HYDROCHLORIDE 100 MG: 100 TABLET ORAL at 20:11

## 2020-09-12 RX ADMIN — MINERAL OIL, PETROLATUM 1 APPLICATION: 425; 573 OINTMENT OPHTHALMIC at 04:45

## 2020-09-12 RX ADMIN — DOCUSATE SODIUM 50 MG AND SENNOSIDES 8.6 MG 2 TABLET: 8.6; 5 TABLET, FILM COATED ORAL at 16:50

## 2020-09-12 RX ADMIN — MYCOPHENOLATE MOFETIL 1000 MG: 250 CAPSULE ORAL at 04:47

## 2020-09-12 ASSESSMENT — ENCOUNTER SYMPTOMS
PALPITATIONS: 0
PHOTOPHOBIA: 0
ABDOMINAL PAIN: 1
MYALGIAS: 1
FOCAL WEAKNESS: 1
COUGH: 0
DOUBLE VISION: 0
VOMITING: 0
HEMOPTYSIS: 0
TINGLING: 0
EYE PAIN: 0
NAUSEA: 0
DEPRESSION: 0
DIARRHEA: 0
HEADACHES: 0
DIZZINESS: 0
BLURRED VISION: 1

## 2020-09-12 ASSESSMENT — PAIN DESCRIPTION - PAIN TYPE
TYPE: ACUTE PAIN

## 2020-09-12 ASSESSMENT — FIBROSIS 4 INDEX: FIB4 SCORE: 0.28

## 2020-09-12 ASSESSMENT — PAIN SCALES - WONG BAKER: WONGBAKER_NUMERICALRESPONSE: DOESN'T HURT AT ALL

## 2020-09-12 NOTE — PROGRESS NOTES
[facility] Medicine Daily Progress Note    Date of Service  9/12/2020     Chief Complaint  29 y.o. female admitted 8/15/2020 with blurred vision.    Hospital Course    PMH transverse myelitis, asthma, chronic relapsing inflammatory optic neuropathy, suprapubic cath, DM who presented with HA and blurred vision. CTA head and neck and TTE were unremarkable. Dr. Yousif was consulted, recommended conservative medication management for her transient monocular visual loss. She also was found with UTI ESBL klebsiella. She is unable to return home, grandmother unable to care for her. Case management has been closely involved.       Interval Problem Update  Last night patient had episode of shortness of breath, chest x-ray showed no acute process, patient was given a breathing treatment without improvement, Ativan was given which resolved her dyspnea.  Patient has had no repeat dyspnea since.  UA yesterday did show numerous WBCs and positive leukocyte esterase will restart Merrem as patient has history of ESBL, cultures pending. Also showed budding yeast, which were not present on previous UA, will start antifungals.   9/4: Laying in bed.  Lethargic.  Easily aroused to stimulus but just quickly back to sleep.  No acute distress noted.  No seizure activity has been noted by staff overnight.  Afebrile.  Hemodynamically stable.  Pending EEG ordered yesterday for presumed seizure activity.  Suprapubic Andrade catheter has been replaced yesterday by IR.  No issues overnight per staff.  9/5: Lethargic.  Awakens to verbal stimulus quickly drifts back to sleep.  Inquiring about palliative team and when they will come and speak with her.  No acute distress noted.  Has been afebrile.  No issues overnight per staff.  9/6: Resting in bed comfortably.  Stated that she feels confused.  Shortly after had a brief episode of what looks like a seizure that was aborted with IV Ativan.  Went into brief postictal phase.  Continues to be lethargic  throughout the day which resulting and poor p.o. intake.  Hemodynamically stable.  Febrile.  No issues overnight per staff.  9/7: Continues to be lethargic.  Poor p.o. fluid intake noted by staff and evidenced by decrease in urine output.  No seizures has been reported since yesterday.  9/8: no acute events overnight, now tolerating diet and oral intake. No seizures noted today or throughout the night. VS stable.   9/9: no acute events overnight, mild erythema around catheter site. Vs stable   9/10: no acute events overnight. Continued pain around catheter site. Patient states irritation and drainage noted. Otherwise remains placement issue.   9/11: no acute events. VS stable. Pending placement  9/12: worsenign pain around catheter site, mild erythema and pus drainge. VS stable no acute events noted.         Consultants/Specialty  Ophtho Dr. Soliz  Neuro-ophtho Dr. Yousif  Neurology  Code Status  DNAR/DNI    Disposition  grandmother unable to care for her  Longterm SNF pending      I certify that the patient requires continued medically necessary services for the treatment of recurrent urinary tract infections in the presence of a suprapubic catheter and will remain in the hospital for 20 days.  Discharge plan is anticipated to be to skilled nursing facility.    Review of Systems  Review of Systems   Constitutional: Positive for malaise/fatigue.   HENT: Negative for hearing loss.    Eyes: Positive for blurred vision (mild). Negative for double vision, photophobia and pain.   Respiratory: Negative for cough and hemoptysis.    Cardiovascular: Negative for chest pain and palpitations.   Gastrointestinal: Positive for abdominal pain (around cath site). Negative for diarrhea, nausea and vomiting.   Genitourinary: Negative for dysuria and urgency.   Musculoskeletal: Positive for myalgias.        Pain b/l feet    Skin: Negative for rash.   Neurological: Positive for focal weakness (chronic). Negative for dizziness,  tingling and headaches.   Psychiatric/Behavioral: Negative for depression and suicidal ideas.        Physical Exam  Temp:  [35.7 °C (96.3 °F)-36.9 °C (98.4 °F)] 36.9 °C (98.4 °F)  Pulse:  [53-77] 73  Resp:  [16-20] 20  BP: ()/(56-91) 121/91  SpO2:  [93 %-97 %] 97 %    Physical Exam  Constitutional:       Appearance: She is obese. She is not toxic-appearing or diaphoretic.      Comments: Appears fatigued   HENT:      Head: Normocephalic and atraumatic.      Mouth/Throat:      Mouth: Mucous membranes are moist.   Eyes:      General:         Right eye: No discharge.         Left eye: No discharge.   Neck:      Musculoskeletal: Neck supple.   Cardiovascular:      Rate and Rhythm: Normal rate and regular rhythm.      Pulses: Normal pulses.   Pulmonary:      Breath sounds: No wheezing, rhonchi or rales.      Comments: Diminished througout  Abdominal:      General: There is no distension.      Tenderness: Tenderness: suprapubic near cath site.      Comments: suprapubic cath in place, no drainage seen, mildly erythematous around tube   Musculoskeletal:         General: Tenderness (b/l feet and ankles ) present.      Right lower leg: No edema.      Left lower leg: No edema.      Comments: Nonpitting edema to legs   Skin:     General: Skin is warm and dry.   Neurological:      Mental Status: She is oriented to person, place, and time and easily aroused. She is lethargic.      Motor: Weakness present.   Psychiatric:         Mood and Affect: Mood is depressed.         Fluids    Intake/Output Summary (Last 24 hours) at 9/12/2020 1216  Last data filed at 9/11/2020 2300  Gross per 24 hour   Intake 240 ml   Output 1225 ml   Net -985 ml       Laboratory                        Imaging  CT-HEAD W/O   Final Result      No evidence of acute intracranial process.      IR-DRAINAGE-CATH EXCHANGE   Final Result      Successful image guided suprapubic tube replacement with upsizing.                  DX-CHEST-LIMITED (1 VIEW)   Final  Result      Hypoinflation without other evidence for acute cardiopulmonary disease.      IR-US GUIDED PIV   Final Result    Ultrasound-guided PERIPHERAL IV INSERTION performed by    qualified nursing staff as above.      CT-CTA CHEST PULMONARY ARTERY W/ RECONS   Final Result      No CT evidence of acute pulmonary thromboembolism      Mild splenomegaly      New small pleural effusions            US-BLADDER   Final Result         1.  Right ureteral jet is not definitively identified, otherwise unremarkable exam.      DX-SHOULDER 2+ LEFT   Final Result      1.  Normal left shoulder series.      DX-CHEST-PORTABLE (1 VIEW)   Final Result         1.  No acute cardiopulmonary disease.      US-BLADDER   Final Result      Suprapubic catheter balloon is inflated, presumably within the collapsed bladder. If more definitive analysis is required, consider clamping the bladder catheter for  hours prior to repeat ultrasound, allowing the bladder to fill with fluid and therefore    better see the bladder walls      CT-HEAD W/O   Final Result         1.  No acute intracranial abnormality.      DX-WRIST-COMPLETE 3+ RIGHT   Final Result      No acute fracture.  If pain persists, recommend repeat imaging in 7 to 10 days.      EC-ECHOCARDIOGRAM COMPLETE W/ CONT   Final Result      CT-CTA NECK WITH & W/O-POST PROCESSING   Final Result      No high-grade stenosis, large vessel occlusion, aneurysm or dissection.      CT-CTA HEAD WITH & W/O-POST PROCESS   Final Result      No intracranial aneurysm, focal stenosis, or abrupt large vessel cut off.      US-CAROTID DOPPLER BILAT   Final Result      CT-HEAD W/O   Final Result      No CT evidence of acute infarct, hemorrhage or mass.           Assessment/Plan  * Bacteriuria- (present on admission)  Assessment & Plan  Likely colonized urinary tract given history of indwelling suprapubic catheter  ESBL klebsiella--> IV meropenem x 7 days completed on 8/26  UA 9/2/2020 shows budding yeast, and new  UTI  9/4: Repeated UA was done prior of Andrade catheter exchange which likely will be positive due to colonization.  There was budding yeast on UA which also considered as colonization.  However, patient was started on Merrem again which will be discontinued.  Will only complete 5 days course of Diflucan since repeat urine culture was not obtained.    Pending LTACH placement    Optic neuritis- (present on admission)  Assessment & Plan  Continue outpatient prednisone   ophthalmology consulted, Dr. Aparicio.. signed off  continue home Cellcept   cta head and neck--WNL  Echo.  Within normal limits  hypercoag w./u.. Unremarkable except for a mild elevation of homocystine  No candidate for MRI due to bladder stimulator         Diabetes mellitus type 2 in obese (HCC)- (present on admission)  Assessment & Plan  Lantus decreased to 8U qhs  Follow-up A1c is 5  Glucose well controlled  9/7: Noted to have normal low blood glucoses.  Likely due to poor p.o. intake.  Will monitor for today and consider reducing/stopping long acting insulin and initiate sliding scale.    Presence of suprapubic catheter (HCC)- (present on admission)  Assessment & Plan  Was placed 8/6/2020  Intermittent hematuria. Bladder scans have been umremarkable. Hematuria improved after stopping lovenox dvt ppx.  8/26 - spoke with Dr. Galicia, would wait 1 month to exchange cath, ok to change with IR after 1 more week, recommends upsizing  Placed IR order for suprapubic catheter exchange per urology recommendations  9/4: Suprapubic cath exchanged by IR on 9/3.   9/9: erythema round suprapubic cath site, no drainge no signs of acute infection  9/10: continued erythema and irritation. Cath site without drainge. Continue to ICE and symptomatic management.   9/12: plan for course of oral abx for mild superficial cellulitis     Morbidly obese (HCC)- (present on admission)  Assessment & Plan  BMI 44.2 kilograms per square meter    Poor fluid intake- (present on  admission)  Assessment & Plan  Encourage PO intake     Seizure (HCC)- (present on admission)  Assessment & Plan  9/4: Noted to have 2 seizure activities on the floor by nursing staff followed by pos ictal state.  CT scan of the head was ordered which was negative for any acute intracranial findings.  EEG has been ordered as well.  Neurology consulted.  PRN Ativan for breakthrough seizures.  9/5: EEG was unremarkable.  Neurology not recommending any AED.  9/6: Had another seizure this morning that was aborted by Ativan.  Could be possibly psychogenic concerning that patient has conversion disorder and other psychological issues.  At this point psychiatry has no further recommendations.  We will continue to monitor and if patient develop another seizure, will consider continuous video EEG.    No repeat seizure activity may need VEEG if repeat activity       Wrist injury, right, initial encounter  Assessment & Plan  Xray negative for fracture    Pain control    Dry eyes- (present on admission)  Assessment & Plan  Eye lubrication     Pain in both feet- (present on admission)  Assessment & Plan  Pain control with po oxycodone        Idiopathic intracranial hypertension- (present on admission)  Assessment & Plan  History of  She is followed by Dr. Yousif, neuro-ophthalmology   continue home Diamox  Supportive care as suggested by neuro-ophthalmology    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HCC)- (present on admission)  Assessment & Plan  Stable at baseline nonambulatory state,    Blurred vision  Assessment & Plan  Presumed retinopathy  monitor    Mild intermittent asthma without complication- (present on admission)  Assessment & Plan  Intermittent flares, affected by smoke  RT protocol, albuterol PRN  CXR no acute process  Likely intermittent dyspnea has anxiety component, resolved with ativan      History of atrial fibrillation and cardiomyopathy?- (present on admission)  Assessment & Plan  No events on telemetry, will  stop    Schizophrenia (HCC)- (present on admission)  Assessment & Plan  History of schizophrenia and conversion disorder  Continue Geodon and Prozac     Peripheral neuropathy and chornic pain syndrome (CMS-HCC)- (present on admission)  Assessment & Plan  At risk of morbid obesity hypoventilation, avoid excessive medications limiting respiratory drive.       VTE prophylaxis: scds, hematuria

## 2020-09-13 PROBLEM — R53.81 DEBILITY: Status: ACTIVE | Noted: 2020-09-13

## 2020-09-13 PROCEDURE — 770020 HCHG ROOM/CARE - TELE (206)

## 2020-09-13 PROCEDURE — A9270 NON-COVERED ITEM OR SERVICE: HCPCS | Performed by: INTERNAL MEDICINE

## 2020-09-13 PROCEDURE — A9270 NON-COVERED ITEM OR SERVICE: HCPCS | Performed by: HOSPITALIST

## 2020-09-13 PROCEDURE — 700102 HCHG RX REV CODE 250 W/ 637 OVERRIDE(OP): Performed by: INTERNAL MEDICINE

## 2020-09-13 PROCEDURE — 700102 HCHG RX REV CODE 250 W/ 637 OVERRIDE(OP): Performed by: HOSPITALIST

## 2020-09-13 PROCEDURE — 99232 SBSQ HOSP IP/OBS MODERATE 35: CPT | Performed by: HOSPITALIST

## 2020-09-13 PROCEDURE — 700102 HCHG RX REV CODE 250 W/ 637 OVERRIDE(OP): Performed by: FAMILY MEDICINE

## 2020-09-13 PROCEDURE — A9270 NON-COVERED ITEM OR SERVICE: HCPCS | Performed by: FAMILY MEDICINE

## 2020-09-13 PROCEDURE — 700111 HCHG RX REV CODE 636 W/ 250 OVERRIDE (IP): Performed by: HOSPITALIST

## 2020-09-13 PROCEDURE — 700111 HCHG RX REV CODE 636 W/ 250 OVERRIDE (IP): Performed by: INTERNAL MEDICINE

## 2020-09-13 RX ADMIN — MICONAZOLE NITRATE: 20 CREAM TOPICAL at 07:38

## 2020-09-13 RX ADMIN — ASPIRIN 81 MG: 81 TABLET, COATED ORAL at 05:34

## 2020-09-13 RX ADMIN — SULFAMETHOXAZOLE AND TRIMETHOPRIM 1 TABLET: 800; 160 TABLET ORAL at 16:17

## 2020-09-13 RX ADMIN — INSULIN GLARGINE 8 UNITS: 100 INJECTION, SOLUTION SUBCUTANEOUS at 16:19

## 2020-09-13 RX ADMIN — GABAPENTIN 300 MG: 300 CAPSULE ORAL at 11:04

## 2020-09-13 RX ADMIN — LORAZEPAM 0.5 MG: 2 INJECTION INTRAMUSCULAR; INTRAVENOUS at 00:49

## 2020-09-13 RX ADMIN — SODIUM BICARBONATE 650 MG: 650 TABLET ORAL at 14:11

## 2020-09-13 RX ADMIN — OXYBUTYNIN CHLORIDE 5 MG: 5 TABLET ORAL at 11:04

## 2020-09-13 RX ADMIN — SODIUM BICARBONATE 650 MG: 650 TABLET ORAL at 00:45

## 2020-09-13 RX ADMIN — ACETAZOLAMIDE 1000 MG: 500 CAPSULE, EXTENDED RELEASE ORAL at 16:19

## 2020-09-13 RX ADMIN — MYCOPHENOLATE MOFETIL 1000 MG: 250 CAPSULE ORAL at 05:33

## 2020-09-13 RX ADMIN — ZIPRASIDONE HYDROCHLORIDE 40 MG: 40 CAPSULE ORAL at 07:38

## 2020-09-13 RX ADMIN — PREGABALIN 300 MG: 150 CAPSULE ORAL at 16:17

## 2020-09-13 RX ADMIN — MAGNESIUM HYDROXIDE 30 ML: 400 SUSPENSION ORAL at 05:40

## 2020-09-13 RX ADMIN — OXYCODONE HYDROCHLORIDE 10 MG: 10 TABLET ORAL at 18:03

## 2020-09-13 RX ADMIN — OXYCODONE HYDROCHLORIDE 10 MG: 10 TABLET ORAL at 00:45

## 2020-09-13 RX ADMIN — BUTALBITAL, ACETAMINOPHEN, AND CAFFEINE 2 TABLET: 50; 325; 40 TABLET ORAL at 05:32

## 2020-09-13 RX ADMIN — ACETAZOLAMIDE 1500 MG: 500 CAPSULE, EXTENDED RELEASE ORAL at 05:33

## 2020-09-13 RX ADMIN — SODIUM BICARBONATE 650 MG: 650 TABLET ORAL at 07:39

## 2020-09-13 RX ADMIN — MICONAZOLE NITRATE: 20 CREAM TOPICAL at 16:19

## 2020-09-13 RX ADMIN — METHOCARBAMOL TABLETS 500 MG: 500 TABLET, COATED ORAL at 07:38

## 2020-09-13 RX ADMIN — BUSPIRONE HYDROCHLORIDE 10 MG: 10 TABLET ORAL at 07:40

## 2020-09-13 RX ADMIN — OXYCODONE HYDROCHLORIDE 10 MG: 10 TABLET ORAL at 05:33

## 2020-09-13 RX ADMIN — IVABRADINE 7.5 MG: 7.5 TABLET, FILM COATED ORAL at 16:18

## 2020-09-13 RX ADMIN — DOCUSATE SODIUM 50 MG AND SENNOSIDES 8.6 MG 2 TABLET: 8.6; 5 TABLET, FILM COATED ORAL at 16:18

## 2020-09-13 RX ADMIN — OXCARBAZEPINE 150 MG: 150 TABLET, FILM COATED ORAL at 05:34

## 2020-09-13 RX ADMIN — ZIPRASIDONE HYDROCHLORIDE 40 MG: 40 CAPSULE ORAL at 16:18

## 2020-09-13 RX ADMIN — TOPIRAMATE 50 MG: 25 TABLET, FILM COATED ORAL at 16:19

## 2020-09-13 RX ADMIN — SULFAMETHOXAZOLE AND TRIMETHOPRIM 1 TABLET: 800; 160 TABLET ORAL at 05:33

## 2020-09-13 RX ADMIN — PREGABALIN 300 MG: 150 CAPSULE ORAL at 05:32

## 2020-09-13 RX ADMIN — MINERAL OIL, PETROLATUM 1 APPLICATION: 425; 573 OINTMENT OPHTHALMIC at 16:18

## 2020-09-13 RX ADMIN — METHOCARBAMOL TABLETS 500 MG: 500 TABLET, COATED ORAL at 20:47

## 2020-09-13 RX ADMIN — METHOCARBAMOL TABLETS 500 MG: 500 TABLET, COATED ORAL at 11:04

## 2020-09-13 RX ADMIN — TOPIRAMATE 50 MG: 25 TABLET, FILM COATED ORAL at 05:34

## 2020-09-13 RX ADMIN — SODIUM BICARBONATE 650 MG: 650 TABLET ORAL at 20:47

## 2020-09-13 RX ADMIN — MYCOPHENOLATE MOFETIL 1000 MG: 250 CAPSULE ORAL at 18:03

## 2020-09-13 RX ADMIN — OXYCODONE HYDROCHLORIDE 10 MG: 10 TABLET ORAL at 11:01

## 2020-09-13 RX ADMIN — LEVOTHYROXINE SODIUM 75 MCG: 0.07 TABLET ORAL at 05:33

## 2020-09-13 RX ADMIN — DOCUSATE SODIUM 100 MG: 100 CAPSULE, LIQUID FILLED ORAL at 16:18

## 2020-09-13 RX ADMIN — BUSPIRONE HYDROCHLORIDE 10 MG: 10 TABLET ORAL at 20:47

## 2020-09-13 RX ADMIN — OXCARBAZEPINE 150 MG: 150 TABLET, FILM COATED ORAL at 16:18

## 2020-09-13 RX ADMIN — OXYBUTYNIN CHLORIDE 5 MG: 5 TABLET ORAL at 16:19

## 2020-09-13 RX ADMIN — PREDNISONE 10 MG: 10 TABLET ORAL at 05:33

## 2020-09-13 RX ADMIN — METHOCARBAMOL TABLETS 500 MG: 500 TABLET, COATED ORAL at 16:18

## 2020-09-13 RX ADMIN — IVABRADINE 7.5 MG: 7.5 TABLET, FILM COATED ORAL at 07:38

## 2020-09-13 RX ADMIN — FLUOXETINE 40 MG: 20 CAPSULE ORAL at 05:34

## 2020-09-13 RX ADMIN — GABAPENTIN 300 MG: 300 CAPSULE ORAL at 16:18

## 2020-09-13 RX ADMIN — LORAZEPAM 0.5 MG: 2 INJECTION INTRAMUSCULAR; INTRAVENOUS at 05:32

## 2020-09-13 RX ADMIN — OMEPRAZOLE 20 MG: 20 CAPSULE, DELAYED RELEASE ORAL at 05:34

## 2020-09-13 RX ADMIN — MINERAL OIL, PETROLATUM 1 APPLICATION: 425; 573 OINTMENT OPHTHALMIC at 12:00

## 2020-09-13 RX ADMIN — GABAPENTIN 300 MG: 300 CAPSULE ORAL at 05:34

## 2020-09-13 ASSESSMENT — ENCOUNTER SYMPTOMS
COUGH: 0
EYE PAIN: 0
DOUBLE VISION: 0
DIZZINESS: 0
FOCAL WEAKNESS: 1
ABDOMINAL PAIN: 1
MYALGIAS: 1
BLURRED VISION: 1
DEPRESSION: 0
PALPITATIONS: 0
TINGLING: 0
DIARRHEA: 0
PHOTOPHOBIA: 0
HEMOPTYSIS: 0
HEADACHES: 0
VOMITING: 0
NAUSEA: 0

## 2020-09-13 ASSESSMENT — PAIN DESCRIPTION - PAIN TYPE
TYPE: ACUTE PAIN

## 2020-09-13 NOTE — PROGRESS NOTES
[facility] Medicine Daily Progress Note    Date of Service  9/13/2020     Chief Complaint  29 y.o. female admitted 8/15/2020 with blurred vision.    Hospital Course    PMH transverse myelitis, asthma, chronic relapsing inflammatory optic neuropathy, suprapubic cath, DM who presented with HA and blurred vision. CTA head and neck and TTE were unremarkable. Dr. Yousif was consulted, recommended conservative medication management for her transient monocular visual loss. She also was found with UTI ESBL klebsiella. She is unable to return home, grandmother unable to care for her. Case management has been closely involved.       Interval Problem Update  Last night patient had episode of shortness of breath, chest x-ray showed no acute process, patient was given a breathing treatment without improvement, Ativan was given which resolved her dyspnea.  Patient has had no repeat dyspnea since.  UA yesterday did show numerous WBCs and positive leukocyte esterase will restart Merrem as patient has history of ESBL, cultures pending. Also showed budding yeast, which were not present on previous UA, will start antifungals.   9/4: Laying in bed.  Lethargic.  Easily aroused to stimulus but just quickly back to sleep.  No acute distress noted.  No seizure activity has been noted by staff overnight.  Afebrile.  Hemodynamically stable.  Pending EEG ordered yesterday for presumed seizure activity.  Suprapubic Andrade catheter has been replaced yesterday by IR.  No issues overnight per staff.  9/5: Lethargic.  Awakens to verbal stimulus quickly drifts back to sleep.  Inquiring about palliative team and when they will come and speak with her.  No acute distress noted.  Has been afebrile.  No issues overnight per staff.  9/6: Resting in bed comfortably.  Stated that she feels confused.  Shortly after had a brief episode of what looks like a seizure that was aborted with IV Ativan.  Went into brief postictal phase.  Continues to be lethargic  throughout the day which resulting and poor p.o. intake.  Hemodynamically stable.  Febrile.  No issues overnight per staff.  9/7: Continues to be lethargic.  Poor p.o. fluid intake noted by staff and evidenced by decrease in urine output.  No seizures has been reported since yesterday.  9/8: no acute events overnight, now tolerating diet and oral intake. No seizures noted today or throughout the night. VS stable.   9/9: no acute events overnight, mild erythema around catheter site. Vs stable   9/10: no acute events overnight. Continued pain around catheter site. Patient states irritation and drainage noted. Otherwise remains placement issue.   9/11: no acute events. VS stable. Pending placement  9/12: worsenign pain around catheter site, mild erythema and pus drainge. VS stable no acute events noted.   9/13: no acute events overnight, VS stable. Patient states she walked to the bathroom twice today.       Consultants/Specialty  Ophtho Dr. Soliz  Neuro-ophtho Dr. Yousif  Neurology  Code Status  DNAR/DNI    Disposition  grandmother unable to care for her  Longterm SNF pending      I certify that the patient requires continued medically necessary services for the treatment of recurrent urinary tract infections in the presence of a suprapubic catheter and will remain in the hospital for 20 days.  Discharge plan is anticipated to be to skilled nursing facility.    Review of Systems  Review of Systems   Constitutional: Positive for malaise/fatigue.   HENT: Negative for hearing loss.    Eyes: Positive for blurred vision (mild). Negative for double vision, photophobia and pain.   Respiratory: Negative for cough and hemoptysis.    Cardiovascular: Negative for chest pain and palpitations.   Gastrointestinal: Positive for abdominal pain (around cath site). Negative for diarrhea, nausea and vomiting.   Genitourinary: Negative for dysuria and urgency.   Musculoskeletal: Positive for myalgias.        Pain b/l feet    Skin:  Negative for rash.   Neurological: Positive for focal weakness (chronic). Negative for dizziness, tingling and headaches.   Psychiatric/Behavioral: Negative for depression and suicidal ideas.        Physical Exam  Temp:  [36 °C (96.8 °F)-36.9 °C (98.5 °F)] 36.1 °C (97 °F)  Pulse:  [60-89] 77  Resp:  [18] 18  BP: ()/(56-86) 95/71  SpO2:  [93 %-96 %] 94 %    Physical Exam  Constitutional:       Appearance: She is obese. She is not toxic-appearing or diaphoretic.      Comments: Appears fatigued   HENT:      Head: Normocephalic and atraumatic.      Mouth/Throat:      Mouth: Mucous membranes are moist.   Eyes:      General:         Right eye: No discharge.         Left eye: No discharge.   Neck:      Musculoskeletal: Neck supple.   Cardiovascular:      Rate and Rhythm: Normal rate and regular rhythm.      Pulses: Normal pulses.   Pulmonary:      Breath sounds: No wheezing, rhonchi or rales.      Comments: Diminished througout  Abdominal:      General: There is no distension.      Tenderness: Tenderness: suprapubic near cath site.      Comments: suprapubic cath in place, no drainage seen, mildly erythematous around tube   Musculoskeletal:         General: Tenderness (b/l feet and ankles ) present.      Right lower leg: No edema.      Left lower leg: No edema.      Comments: Nonpitting edema to legs   Skin:     General: Skin is warm and dry.   Neurological:      Mental Status: She is oriented to person, place, and time and easily aroused. She is lethargic.      Motor: Weakness present.   Psychiatric:         Mood and Affect: Mood is depressed.         Fluids    Intake/Output Summary (Last 24 hours) at 9/13/2020 1100  Last data filed at 9/13/2020 0900  Gross per 24 hour   Intake 180 ml   Output 1500 ml   Net -1320 ml       Laboratory                        Imaging  CT-HEAD W/O   Final Result      No evidence of acute intracranial process.      IR-DRAINAGE-CATH EXCHANGE   Final Result      Successful image guided  suprapubic tube replacement with upsizing.                  DX-CHEST-LIMITED (1 VIEW)   Final Result      Hypoinflation without other evidence for acute cardiopulmonary disease.      IR-US GUIDED PIV   Final Result    Ultrasound-guided PERIPHERAL IV INSERTION performed by    qualified nursing staff as above.      CT-CTA CHEST PULMONARY ARTERY W/ RECONS   Final Result      No CT evidence of acute pulmonary thromboembolism      Mild splenomegaly      New small pleural effusions            US-BLADDER   Final Result         1.  Right ureteral jet is not definitively identified, otherwise unremarkable exam.      DX-SHOULDER 2+ LEFT   Final Result      1.  Normal left shoulder series.      DX-CHEST-PORTABLE (1 VIEW)   Final Result         1.  No acute cardiopulmonary disease.      US-BLADDER   Final Result      Suprapubic catheter balloon is inflated, presumably within the collapsed bladder. If more definitive analysis is required, consider clamping the bladder catheter for  hours prior to repeat ultrasound, allowing the bladder to fill with fluid and therefore    better see the bladder walls      CT-HEAD W/O   Final Result         1.  No acute intracranial abnormality.      DX-WRIST-COMPLETE 3+ RIGHT   Final Result      No acute fracture.  If pain persists, recommend repeat imaging in 7 to 10 days.      EC-ECHOCARDIOGRAM COMPLETE W/ CONT   Final Result      CT-CTA NECK WITH & W/O-POST PROCESSING   Final Result      No high-grade stenosis, large vessel occlusion, aneurysm or dissection.      CT-CTA HEAD WITH & W/O-POST PROCESS   Final Result      No intracranial aneurysm, focal stenosis, or abrupt large vessel cut off.      US-CAROTID DOPPLER BILAT   Final Result      CT-HEAD W/O   Final Result      No CT evidence of acute infarct, hemorrhage or mass.           Assessment/Plan  * Debility  Assessment & Plan  She needs to work with PT/OT daily   She needs to get up and walk I have seen her and she is more than  capable  Care coordination meeting tomorrow   Refused from [facility] facility, unsafe dishcarge as her grandmother cannot take care of her      Optic neuritis- (present on admission)  Assessment & Plan  Continue outpatient prednisone   ophthalmology consulted, Dr. Aparicio.. signed off  continue home Cellcept   cta head and neck--WNL  Echo.  Within normal limits  hypercoag w./u.. Unremarkable except for a mild elevation of homocystine  No candidate for MRI due to bladder stimulator         Diabetes mellitus type 2 in obese (HCC)- (present on admission)  Assessment & Plan  Lantus decreased to 8U qhs  Follow-up A1c is 5  Glucose well controlled  9/7: Noted to have normal low blood glucoses.  Likely due to poor p.o. intake.  Will monitor for today and consider reducing/stopping long acting insulin and initiate sliding scale.    Presence of suprapubic catheter (HCC)- (present on admission)  Assessment & Plan  Was placed 8/6/2020  Intermittent hematuria. Bladder scans have been umremarkable. Hematuria improved after stopping lovenox dvt ppx.  8/26 - spoke with Dr. Galicia, would wait 1 month to exchange cath, ok to change with IR after 1 more week, recommends upsizing  Placed IR order for suprapubic catheter exchange per urology recommendations  9/4: Suprapubic cath exchanged by IR on 9/3.   9/9: erythema round suprapubic cath site, no drainge no signs of acute infection  9/10: continued erythema and irritation. Cath site without drainge. Continue to ICE and symptomatic management.   9/12: plan for course of oral abx for mild superficial cellulitis     Morbidly obese (HCC)- (present on admission)  Assessment & Plan  BMI 44.2 kilograms per square meter    Poor fluid intake- (present on admission)  Assessment & Plan  Encourage PO intake     Seizure (HCC)- (present on admission)  Assessment & Plan  9/4: Noted to have 2 seizure activities on the floor by nursing staff followed by pos ictal state.  CT scan of the head was  ordered which was negative for any acute intracranial findings.  EEG has been ordered as well.  Neurology consulted.  PRN Ativan for breakthrough seizures.  9/5: EEG was unremarkable.  Neurology not recommending any AED.  9/6: Had another seizure this morning that was aborted by Ativan.  Could be possibly psychogenic concerning that patient has conversion disorder and other psychological issues.  At this point psychiatry has no further recommendations.  We will continue to monitor and if patient develop another seizure, will consider continuous video EEG.    No repeat seizure activity may need VEEG if repeat activity       Wrist injury, right, initial encounter  Assessment & Plan  Xray negative for fracture    Pain control    Dry eyes- (present on admission)  Assessment & Plan  Eye lubrication     Pain in both feet- (present on admission)  Assessment & Plan  Pain control with po oxycodone        Idiopathic intracranial hypertension- (present on admission)  Assessment & Plan  History of  She is followed by Dr. Yousif, neuro-ophthalmology   continue home Diamox  Supportive care as suggested by neuro-ophthalmology    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HCC)- (present on admission)  Assessment & Plan  Stable at baseline nonambulatory state,    Blurred vision  Assessment & Plan  Presumed retinopathy  monitor    Mild intermittent asthma without complication- (present on admission)  Assessment & Plan  Intermittent flares, affected by smoke  RT protocol, albuterol PRN  CXR no acute process  Likely intermittent dyspnea has anxiety component, resolved with ativan      History of atrial fibrillation and cardiomyopathy?- (present on admission)  Assessment & Plan  No events on telemetry, will stop    Bacteriuria- (present on admission)  Assessment & Plan  Likely colonized urinary tract given history of indwelling suprapubic catheter  ESBL klebsiella--> IV meropenem x 7 days completed on 8/26  UA 9/2/2020 shows budding yeast, and new  UTI  9/4: Repeated UA was done prior of Andrade catheter exchange which likely will be positive due to colonization.  There was budding yeast on UA which also considered as colonization.  However, patient was started on Merrem again which will be discontinued.  Will only complete 5 days course of Diflucan since repeat urine culture was not obtained.      Schizophrenia (HCC)- (present on admission)  Assessment & Plan  History of schizophrenia and conversion disorder  Continue Geodon and Prozac     Peripheral neuropathy and chornic pain syndrome (CMS-HCC)- (present on admission)  Assessment & Plan  At risk of morbid obesity hypoventilation, avoid excessive medications limiting respiratory drive.       VTE prophylaxis: scds, hematuria

## 2020-09-13 NOTE — CARE PLAN
Problem: Bowel/Gastric:  Goal: Will not experience complications related to bowel motility  Outcome: PROGRESSING AS EXPECTED     Problem: Pain Management  Goal: Pain level will decrease to patient's comfort goal  Outcome: PROGRESSING AS EXPECTED     Problem: Skin Integrity  Goal: Risk for impaired skin integrity will decrease  Outcome: PROGRESSING AS EXPECTED     Problem: Urinary Elimination:  Goal: Ability to reestablish a normal urinary elimination pattern will improve  Outcome: PROGRESSING AS EXPECTED

## 2020-09-13 NOTE — PROGRESS NOTES
"Patient remains anxious throughout this shift.  Irritable this AM with improvement this afternoon.  Continues to make frequent complaints about various things and is attention seeking.  Tolerating ambulation 100-150 feet at a time x4 so far this shift with plans to ambulate after dinner.  Transfers and ambulates without assistance.  Catheter flushed at this time due to patient complaints of feeling like there is \"sediment or blood clots\" blocking the tube.  After flushing with sterile water patient states relief.  Urine slightly pink in color in tubing, tea colored in bag.  Currently resting in bed at this time.   "

## 2020-09-13 NOTE — ASSESSMENT & PLAN NOTE
She needs to work with PT/OT daily   She needs to get up and walk I have seen her and she is more than capable  Care coordination meeting tomorrow   Refused from [facility] facility, unsafe dishcarge as her grandmother cannot take care of her

## 2020-09-14 VITALS
OXYGEN SATURATION: 96 % | BODY MASS INDEX: 42.61 KG/M2 | DIASTOLIC BLOOD PRESSURE: 67 MMHG | RESPIRATION RATE: 20 BRPM | WEIGHT: 255.73 LBS | TEMPERATURE: 97.4 F | HEART RATE: 78 BPM | SYSTOLIC BLOOD PRESSURE: 117 MMHG | HEIGHT: 65 IN

## 2020-09-14 PROCEDURE — A9270 NON-COVERED ITEM OR SERVICE: HCPCS | Performed by: HOSPITALIST

## 2020-09-14 PROCEDURE — 700111 HCHG RX REV CODE 636 W/ 250 OVERRIDE (IP): Performed by: HOSPITALIST

## 2020-09-14 PROCEDURE — 700102 HCHG RX REV CODE 250 W/ 637 OVERRIDE(OP): Performed by: HOSPITALIST

## 2020-09-14 PROCEDURE — A9270 NON-COVERED ITEM OR SERVICE: HCPCS | Performed by: FAMILY MEDICINE

## 2020-09-14 PROCEDURE — 700111 HCHG RX REV CODE 636 W/ 250 OVERRIDE (IP): Performed by: INTERNAL MEDICINE

## 2020-09-14 PROCEDURE — 99239 HOSP IP/OBS DSCHRG MGMT >30: CPT | Performed by: HOSPITALIST

## 2020-09-14 PROCEDURE — 700102 HCHG RX REV CODE 250 W/ 637 OVERRIDE(OP): Performed by: INTERNAL MEDICINE

## 2020-09-14 PROCEDURE — A9270 NON-COVERED ITEM OR SERVICE: HCPCS | Performed by: INTERNAL MEDICINE

## 2020-09-14 PROCEDURE — 700102 HCHG RX REV CODE 250 W/ 637 OVERRIDE(OP): Performed by: FAMILY MEDICINE

## 2020-09-14 PROCEDURE — 700102 HCHG RX REV CODE 250 W/ 637 OVERRIDE(OP): Performed by: NURSE PRACTITIONER

## 2020-09-14 RX ORDER — OXYCODONE HYDROCHLORIDE 10 MG/1
10 TABLET ORAL EVERY 4 HOURS PRN
Qty: 28 TAB | Refills: 0 | Status: SHIPPED | OUTPATIENT
Start: 2020-09-14 | End: 2020-09-29

## 2020-09-14 RX ORDER — TRAZODONE HYDROCHLORIDE 100 MG/1
100 TABLET ORAL
Qty: 30 TAB | Refills: 3 | Status: SHIPPED | OUTPATIENT
Start: 2020-09-14 | End: 2021-02-26

## 2020-09-14 RX ORDER — OMEPRAZOLE 20 MG/1
20 CAPSULE, DELAYED RELEASE ORAL DAILY
Qty: 30 CAP | Refills: 0 | Status: ON HOLD
Start: 2020-09-15 | End: 2021-01-22

## 2020-09-14 RX ORDER — SULFAMETHOXAZOLE AND TRIMETHOPRIM 800; 160 MG/1; MG/1
1 TABLET ORAL EVERY 12 HOURS
Qty: 10 TAB | Refills: 0 | Status: SHIPPED | OUTPATIENT
Start: 2020-09-14 | End: 2020-09-19

## 2020-09-14 RX ORDER — LEVOTHYROXINE SODIUM 0.07 MG/1
75 TABLET ORAL
Qty: 30 TAB | Refills: 0 | Status: SHIPPED | OUTPATIENT
Start: 2020-09-15 | End: 2020-12-22 | Stop reason: SDUPTHER

## 2020-09-14 RX ORDER — OXYBUTYNIN CHLORIDE 5 MG/1
5 TABLET ORAL 3 TIMES DAILY
Qty: 90 TAB | Refills: 0 | Status: SHIPPED
Start: 2020-09-14 | End: 2021-04-15

## 2020-09-14 RX ORDER — KETOROLAC TROMETHAMINE 30 MG/ML
30 INJECTION, SOLUTION INTRAMUSCULAR; INTRAVENOUS ONCE
Status: DISCONTINUED | OUTPATIENT
Start: 2020-09-14 | End: 2020-09-14 | Stop reason: CLARIF

## 2020-09-14 RX ORDER — INSULIN GLARGINE 100 [IU]/ML
8 INJECTION, SOLUTION SUBCUTANEOUS EVERY EVENING
Qty: 10 ML | Refills: 0 | Status: SHIPPED
Start: 2020-09-14 | End: 2021-01-27

## 2020-09-14 RX ORDER — FLUOXETINE HYDROCHLORIDE 40 MG/1
40 CAPSULE ORAL DAILY
Qty: 30 CAP | Refills: 0 | Status: SHIPPED | OUTPATIENT
Start: 2020-09-14 | End: 2020-12-16 | Stop reason: SDUPTHER

## 2020-09-14 RX ORDER — ZIPRASIDONE HYDROCHLORIDE 40 MG/1
40 CAPSULE ORAL 2 TIMES DAILY
Qty: 60 CAP | Refills: 0 | Status: SHIPPED | OUTPATIENT
Start: 2020-09-14 | End: 2020-12-16 | Stop reason: SDUPTHER

## 2020-09-14 RX ORDER — TOPIRAMATE 50 MG/1
50 TABLET, FILM COATED ORAL 2 TIMES DAILY
Qty: 60 TAB | Refills: 3 | Status: SHIPPED
Start: 2020-09-14 | End: 2020-10-20

## 2020-09-14 RX ADMIN — MYCOPHENOLATE MOFETIL 1000 MG: 250 CAPSULE ORAL at 05:08

## 2020-09-14 RX ADMIN — OXYCODONE HYDROCHLORIDE 10 MG: 10 TABLET ORAL at 12:36

## 2020-09-14 RX ADMIN — IVABRADINE 7.5 MG: 7.5 TABLET, FILM COATED ORAL at 08:23

## 2020-09-14 RX ADMIN — GABAPENTIN 300 MG: 300 CAPSULE ORAL at 12:36

## 2020-09-14 RX ADMIN — FLUOXETINE 40 MG: 20 CAPSULE ORAL at 05:09

## 2020-09-14 RX ADMIN — OMEPRAZOLE 20 MG: 20 CAPSULE, DELAYED RELEASE ORAL at 05:09

## 2020-09-14 RX ADMIN — SODIUM BICARBONATE 650 MG: 650 TABLET ORAL at 02:58

## 2020-09-14 RX ADMIN — OXYCODONE HYDROCHLORIDE 10 MG: 10 TABLET ORAL at 02:58

## 2020-09-14 RX ADMIN — BUSPIRONE HYDROCHLORIDE 10 MG: 10 TABLET ORAL at 08:17

## 2020-09-14 RX ADMIN — MICONAZOLE NITRATE: 20 CREAM TOPICAL at 08:18

## 2020-09-14 RX ADMIN — METHOCARBAMOL TABLETS 500 MG: 500 TABLET, COATED ORAL at 08:18

## 2020-09-14 RX ADMIN — DOCUSATE SODIUM 50 MG AND SENNOSIDES 8.6 MG 2 TABLET: 8.6; 5 TABLET, FILM COATED ORAL at 08:23

## 2020-09-14 RX ADMIN — LEVOTHYROXINE SODIUM 75 MCG: 0.07 TABLET ORAL at 05:09

## 2020-09-14 RX ADMIN — SODIUM BICARBONATE 650 MG: 650 TABLET ORAL at 16:11

## 2020-09-14 RX ADMIN — OXCARBAZEPINE 150 MG: 150 TABLET, FILM COATED ORAL at 05:10

## 2020-09-14 RX ADMIN — GABAPENTIN 300 MG: 300 CAPSULE ORAL at 05:09

## 2020-09-14 RX ADMIN — METHOCARBAMOL TABLETS 500 MG: 500 TABLET, COATED ORAL at 12:36

## 2020-09-14 RX ADMIN — PREGABALIN 300 MG: 150 CAPSULE ORAL at 05:09

## 2020-09-14 RX ADMIN — OXYCODONE HYDROCHLORIDE 10 MG: 10 TABLET ORAL at 16:11

## 2020-09-14 RX ADMIN — LORAZEPAM 0.5 MG: 2 INJECTION INTRAMUSCULAR; INTRAVENOUS at 08:16

## 2020-09-14 RX ADMIN — MINERAL OIL, PETROLATUM 1 APPLICATION: 425; 573 OINTMENT OPHTHALMIC at 08:17

## 2020-09-14 RX ADMIN — OXYCODONE HYDROCHLORIDE 10 MG: 10 TABLET ORAL at 08:16

## 2020-09-14 RX ADMIN — OXYBUTYNIN CHLORIDE 5 MG: 5 TABLET ORAL at 05:10

## 2020-09-14 RX ADMIN — LORAZEPAM 0.5 MG: 2 INJECTION INTRAMUSCULAR; INTRAVENOUS at 03:32

## 2020-09-14 RX ADMIN — SODIUM BICARBONATE 650 MG: 650 TABLET ORAL at 08:17

## 2020-09-14 RX ADMIN — MINERAL OIL, PETROLATUM 1 APPLICATION: 425; 573 OINTMENT OPHTHALMIC at 12:37

## 2020-09-14 RX ADMIN — DOCUSATE SODIUM 100 MG: 100 CAPSULE, LIQUID FILLED ORAL at 08:23

## 2020-09-14 RX ADMIN — OXYBUTYNIN CHLORIDE 5 MG: 5 TABLET ORAL at 12:36

## 2020-09-14 RX ADMIN — TOPIRAMATE 50 MG: 25 TABLET, FILM COATED ORAL at 08:24

## 2020-09-14 RX ADMIN — PROCHLORPERAZINE MALEATE 10 MG: 10 TABLET ORAL at 12:36

## 2020-09-14 RX ADMIN — SULFAMETHOXAZOLE AND TRIMETHOPRIM 1 TABLET: 800; 160 TABLET ORAL at 05:09

## 2020-09-14 RX ADMIN — ACETAZOLAMIDE 1500 MG: 500 CAPSULE, EXTENDED RELEASE ORAL at 05:08

## 2020-09-14 RX ADMIN — ASPIRIN 81 MG: 81 TABLET, COATED ORAL at 05:09

## 2020-09-14 RX ADMIN — PREDNISONE 10 MG: 10 TABLET ORAL at 05:09

## 2020-09-14 RX ADMIN — ZIPRASIDONE HYDROCHLORIDE 40 MG: 40 CAPSULE ORAL at 08:18

## 2020-09-14 ASSESSMENT — PAIN DESCRIPTION - PAIN TYPE
TYPE: ACUTE PAIN;CHRONIC PAIN
TYPE: ACUTE PAIN
TYPE: CHRONIC PAIN
TYPE: ACUTE PAIN
TYPE: ACUTE PAIN

## 2020-09-14 NOTE — DISCHARGE SUMMARY
"Discharge Summary    CHIEF COMPLAINT ON ADMISSION  Chief Complaint   Patient presents with   • Eye Pain     \"I have worms in my eye\"       Reason for Admission  EMS     Admission Date  8/15/2020    CODE STATUS  DNAR/DNI    HPI & HOSPITAL COURSE   PMH transverse myelitis, asthma, chronic relapsing inflammatory optic neuropathy, suprapubic cath, DM who presented with HA and blurred vision. CTA head and neck and TTE were unremarkable. Dr. Yousif was consulted, recommended conservative medication management for her transient monocular visual loss. She also was found with UTI ESBL klebsiella.  She did have catheter exchange by interventional radiology after recommendations by urology.  They will follow her back up in 1 month..  This patient's hospital stay was complicated as this patient was pending LTAC for quite some time.  Also pending skilled nursing facilities.  However this patient was refused by all L-thyroxine skilled nursing facilities.  She was also found lethargic lying in bed on 9/4/2020 had a EEG that was unremarkable.  Neurology seen the patient not recommending any AEDs.  This patient did have evidence of potential psychogenic seizures at that point neurology recommended continuous video EEG but this patient did not have any repeat seizure-like activity.  She was worked with physical therapy as well as multiple staff members.  She has been refused from all skilled nursing facilities in LTAC.  She has been walked around the unit multiple times and is stable to go home to her grandmother.  She is also noted to have some mild erythema around her catheter site and will be discharged on oral antibiotics for possible superficial cellulitis.  She will be discharged home with close outpatient follow-up.      * Debility  Assessment & Plan  She needs to work with PT/OT daily   She needs to get up and walk I have seen her and she is more than capable  Care coordination meeting tomorrow   Refused from [facility] " facility, unsafe dishcarge as her grandmother cannot take care of her        Optic neuritis- (present on admission)  Assessment & Plan  Continue outpatient prednisone   ophthalmology consulted, Dr. Aparicio.. signed off  continue home Cellcept   cta head and neck--WNL  Echo.  Within normal limits  hypercoag w./u.. Unremarkable except for a mild elevation of homocystine  No candidate for MRI due to bladder stimulator            Diabetes mellitus type 2 in obese (HCC)- (present on admission)  Assessment & Plan  Lantus decreased to 8U qhs  Follow-up A1c is 5  Glucose well controlled  9/7: Noted to have normal low blood glucoses.  Likely due to poor p.o. intake.  Will monitor for today and consider reducing/stopping long acting insulin and initiate sliding scale.     Presence of suprapubic catheter ([facility])- (present on admission)  Assessment & Plan  Was placed 8/6/2020  Intermittent hematuria. Bladder scans have been umremarkable. Hematuria improved after stopping lovenox dvt ppx.  8/26 - spoke with Dr. Galicia, would wait 1 month to exchange cath, ok to change with IR after 1 more week, recommends upsizing  Placed IR order for suprapubic catheter exchange per urology recommendations  9/4: Suprapubic cath exchanged by IR on 9/3.   9/9: erythema round suprapubic cath site, no drainge no signs of acute infection  9/10: continued erythema and irritation. Cath site without drainge. Continue to ICE and symptomatic management.   9/12: plan for course of oral abx for mild superficial cellulitis      Morbidly obese (HCC)- (present on admission)  Assessment & Plan  BMI 44.2 kilograms per square meter     Poor fluid intake- (present on admission)  Assessment & Plan  Encourage PO intake      Seizure (HCC)- (present on admission)  Assessment & Plan  9/4: Noted to have 2 seizure activities on the floor by nursing staff followed by pos ictal state.  CT scan of the head was ordered which was negative for any acute intracranial  findings.  EEG has been ordered as well.  Neurology consulted.  PRN Ativan for breakthrough seizures.  9/5: EEG was unremarkable.  Neurology not recommending any AED.  9/6: Had another seizure this morning that was aborted by Ativan.  Could be possibly psychogenic concerning that patient has conversion disorder and other psychological issues.  At this point psychiatry has no further recommendations.  We will continue to monitor and if patient develop another seizure, will consider continuous video EEG.     No repeat seizure activity may need VEEG if repeat activity         Wrist injury, right, initial encounter  Assessment & Plan  Xray negative for fracture     Pain control     Dry eyes- (present on admission)  Assessment & Plan  Eye lubrication      Pain in both feet- (present on admission)  Assessment & Plan  Pain control with po oxycodone           Idiopathic intracranial hypertension- (present on admission)  Assessment & Plan  History of  She is followed by Dr. Yousif, neuro-ophthalmology   continue home Diamox  Supportive care as suggested by neuro-ophthalmology     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HCC)- (present on admission)  Assessment & Plan  Stable at baseline nonambulatory state,     Blurred vision  Assessment & Plan  Presumed retinopathy  monitor     Mild intermittent asthma without complication- (present on admission)  Assessment & Plan  Intermittent flares, affected by smoke  RT protocol, albuterol PRN  CXR no acute process  Likely intermittent dyspnea has anxiety component, resolved with ativan        History of atrial fibrillation and cardiomyopathy?- (present on admission)  Assessment & Plan  No events on telemetry, will stop     Bacteriuria- (present on admission)  Assessment & Plan  Likely colonized urinary tract given history of indwelling suprapubic catheter  ESBL klebsiella--> IV meropenem x 7 days completed on 8/26  UA 9/2/2020 shows budding yeast, and new UTI  9/4: Repeated UA was done prior of Andrade  catheter exchange which likely will be positive due to colonization.  There was budding yeast on UA which also considered as colonization.  However, patient was started on Merrem again which will be discontinued.  Will only complete 5 days course of Diflucan since repeat urine culture was not obtained.        Schizophrenia (HCC)- (present on admission)  Assessment & Plan  History of schizophrenia and conversion disorder  Continue Geodon and Prozac      Peripheral neuropathy and chornic pain syndrome (CMS-HCC)- (present on admission)  Assessment & Plan  At risk of morbid obesity hypoventilation, avoid excessive medications limiting respiratory drive.    Therefore, she is discharged in good and stable condition to home with close outpatient follow-up.    The patient met 2-midnight criteria for an inpatient stay at the time of discharge.    Discharge Date  9/14/20     FOLLOW UP ITEMS POST DISCHARGE  pcp   Neuro   Urology      DISCHARGE DIAGNOSES  Principal Problem:    Debility POA: Unknown  Active Problems:    Optic neuritis POA: Yes    Morbidly obese (HCC) POA: Yes      Overview: IMO load March 2020    Presence of suprapubic catheter (HCC) POA: Yes    Diabetes mellitus type 2 in obese (HCC) POA: Yes    Mild intermittent asthma without complication POA: Yes      Overview: when around smoke    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HCC) POA: Yes    Idiopathic intracranial hypertension POA: Yes    Pain in both feet POA: Yes    Dry eyes POA: Yes    Wrist injury, right, initial encounter POA: No    Seizure (HCC) POA: Yes    Poor fluid intake POA: Yes    Peripheral neuropathy and chornic pain syndrome (CMS-HCC) (Chronic) POA: Yes    Schizophrenia (HCC) POA: Yes    Bacteriuria POA: Yes    History of atrial fibrillation and cardiomyopathy? POA: Yes  Resolved Problems:    Diarrhea POA: Unknown    Hypokalemia POA: Yes    Chest pain POA: Unknown    Urinary tract infection due to ESBL Klebsiella POA: Yes    QT prolongation POA: Unknown      FOLLOW  UP  Future Appointments   Date Time Provider Department Center   9/22/2020  7:40 AM Torres Brody M.D. [address]   11/12/2020 10:40 AM John Leon M.D. [facility] None     Torres Brody M.D.  [address]  [place] [id number]  [phone]    In 1 week        MEDICATIONS ON DISCHARGE     Medication List      START taking these medications      Instructions   insulin glargine 100 UNIT/ML Soln  Commonly known as: Lantus  Replaces: Lantus SoloStar 100 UNIT/ML Sopn injection   Inject 8 Units as instructed every evening.  Dose: 8 Units     omeprazole 20 MG delayed-release capsule  Start taking on: September 15, 2020  Commonly known as: PRILOSEC   Take 1 Cap by mouth every day.  Dose: 20 mg     oxybutynin 5 MG Tabs  Commonly known as: DITROPAN   Take 1 Tab by mouth 3 times a day.  Dose: 5 mg     oxyCODONE immediate release 10 MG immediate release tablet  Commonly known as: ROXICODONE   Take 1 Tab by mouth every four hours as needed for up to 15 days.  Dose: 10 mg     sulfamethoxazole-trimethoprim 800-160 MG tablet  Commonly known as: BACTRIM DS   Take 1 Tab by mouth every 12 hours for 5 days.  Dose: 1 Tab        CHANGE how you take these medications      Instructions   levothyroxine 75 MCG Tabs  Start taking on: September 15, 2020  What changed:   · medication strength  · how much to take  · Another medication with the same name was removed. Continue taking this medication, and follow the directions you see here.  Commonly known as: SYNTHROID   Take 1 Tab by mouth Every morning on an empty stomach.  Dose: 75 mcg     * PROzac 40 MG capsule  What changed: Another medication with the same name was changed. Make sure you understand how and when to take each.  Generic drug: fluoxetine   Take 40 mg by mouth every day.  Dose: 40 mg     * fluoxetine 40 MG capsule  What changed: when to take this  Commonly known as: PROZAC   Take 1 Cap by mouth every day.  Dose: 40 mg     topiramate 50 MG tablet  What  changed:   · medication strength  · how much to take  · when to take this  · additional instructions  · Another medication with the same name was removed. Continue taking this medication, and follow the directions you see here.  Commonly known as: TOPAMAX   Take 1 Tab by mouth 2 Times a Day.  Dose: 50 mg     traZODone 100 MG Tabs  What changed:   · when to take this  · Another medication with the same name was removed. Continue taking this medication, and follow the directions you see here.  Commonly known as: DESYREL   Take 1 Tab by mouth every bedtime.  Dose: 100 mg     ziprasidone 40 MG Caps  What changed:   · when to take this  · additional instructions  · Another medication with the same name was removed. Continue taking this medication, and follow the directions you see here.  Commonly known as: GEODON   Take 1 Cap by mouth 2 Times a Day.  Dose: 40 mg         * This list has 2 medication(s) that are the same as other medications prescribed for you. Read the directions carefully, and ask your doctor or other care provider to review them with you.            CONTINUE taking these medications      Instructions   acetaZOLAMIDE  MG Cp12  Commonly known as: DIAMOX   Take 1,000-1,500 mg by mouth 2 times a day. 1500mg in AM  1000mg at PM  Dose: 1,000-1,500 mg     albuterol 108 (90 Base) MCG/ACT Aers inhalation aerosol   Inhale 2 Puffs by mouth every 6 hours as needed for Shortness of Breath.  Dose: 2 Puff     aspirin EC 81 MG Tbec  Commonly known as: ECOTRIN   Take 81 mg by mouth every day.  Dose: 81 mg     busPIRone 10 MG Tabs tablet  Commonly known as: BUSPAR   Take 10 mg by mouth 2 times a day.  Dose: 10 mg     CellCept 500 MG tablet  Generic drug: mycophenolate   Take 1,000 mg by mouth 2 times a day.  Dose: 1,000 mg     Coenzyme Q10 300 MG Caps   Take 300 mg by mouth every day.  Dose: 300 mg     gabapentin 300 MG Caps  Commonly known as: NEURONTIN   Take 300 mg by mouth 3 times a day.  Dose: 300 mg      Lyrica 300 MG capsule  Generic drug: pregabalin   Take 300 mg by mouth 2 times a day.  Dose: 300 mg     magnesium oxide 400 MG Tabs tablet  Commonly known as: MAG-OX   Take 400 mg by mouth every day.  Dose: 400 mg     Melatonin 10 MG Tabs   Take 10 mg by mouth every evening.  Dose: 10 mg     methocarbamol 750 MG Tabs  Commonly known as: ROBAXIN   Take 750 mg by mouth 4 times a day.  Dose: 750 mg     Myrbetriq 50 MG Tb24  Generic drug: Mirabegron ER   Take 50 mg by mouth every day.  Dose: 50 mg     Nexplanon 68 MG Impl implant  Generic drug: etonogestrel   Inject 1 Each as instructed Once.  Dose: 1 Each     nitrofurantoin macro crystals 100 MG Caps  Commonly known as: MACRODANTIN   Take 100 mg by mouth every day. Maintenance Medication.  Dose: 100 mg     nystatin 350752 UNIT/GM Crea topical cream  Commonly known as: MYCOSTATIN   Apply 1 Application to affected area(s) 2 times a day as needed (To folds, prevention of fungal growth.).  Dose: 1 Application     ondansetron 4 MG Tbdp  Commonly known as: ZOFRAN ODT   Take 4 mg by mouth every 6 hours as needed for Nausea.  Dose: 4 mg     OXcarbazepine 150 MG Tabs  Commonly known as: TRILEPTAL   Take 150 mg by mouth 2 times a day.  Dose: 150 mg     potassium chloride SA 20 MEQ Tbcr  Commonly known as: Kdur   Take 20 mEq by mouth 2 times a day.  Dose: 20 mEq     predniSONE 10 MG Tabs  Commonly known as: DELTASONE   Take 10 mg by mouth every day. Maintenance Medication.  Dose: 10 mg     Riboflavin 400 MG Tabs   Take 400 mg by mouth every day.  Dose: 400 mg     sodium bicarbonate 650 MG Tabs  Commonly known as: SODIUM BICARBONATE   Take 650 mg by mouth 4 times a day.  Dose: 650 mg     Trulicity 1.5 MG/0.5ML Sopn  Generic drug: Dulaglutide   Inject 0.5 mL as instructed every Friday.  Dose: 0.5 mL     vitamin D 1000 Unit (25 mcg) Tabs  Commonly known as: cholecalciferol   Take 1,000 Units by mouth every day.  Dose: 1,000 Units        STOP taking these medications   "  ipratropium-albuterol 0.5-2.5 (3) MG/3ML nebulizer solution  Commonly known as: DUONEB     Lantus SoloStar 100 UNIT/ML Sopn injection  Generic drug: insulin glargine  Replaced by: insulin glargine 100 UNIT/ML Soln        ASK your doctor about these medications      Instructions   Corlanor 7.5 MG Tabs tablet  Generic drug: ivabradine  Ask about: Which instructions should I use?   Take 7.5 mg by mouth 2 times a day, with meals.  Dose: 7.5 mg            Allergies  Allergies   Allergen Reactions   • Depakote [Divalproex Sodium] Unspecified     Muscle spasms/muscle pain and weakness     • Amitriptyline Unspecified     Headaches     • Aripiprazole [Abilify] Unspecified     Headaches/muscle twitching     • Clindamycin Nausea     Even with food     • Flagyl [Metronidazole Hcl] Unspecified     \"eye problems\"     • Flomax [Tamsulosin Hydrochloride] Swelling   • Levaquin Unspecified     Severe muscle cramps in legs  RXN=unknown   • Metformin Unspecified     Increased lactic acid      • Tape Rash     Tears skin off  coban with Tegaderm tape ok intermittently  RXN=ongoing   • Vancomycin Itching     Pt becomes flushed in face and chest.   RXN=7/10/16   • Wound Dressing Adhesive Hives     By pt report   • Ciprofloxacin    • Hydromorphone Hcl      Pt states she feels loopy   • Keflex Rash     Pt states she gets a rash with this medication  Tolerates ceftriaxone   • Levofloxacin Unspecified     Leg muscle cramps   • Metronidazole Rash     .   • Penicillins    • Tamsulosin    • Valproic Acid Rash     .       DIET  Orders Placed This Encounter   Procedures   • Diet Order Diabetic     Standing Status:   Standing     Number of Occurrences:   1     Order Specific Question:   Diet:     Answer:   Diabetic [3]     Order Specific Question:   Calorie modifications:     Answer:   1800 kcals [4]     Order Specific Question:   Texture Modifier     Answer:   Level 6 - Soft & Bite Sized (Dysphagia 3)     Order Specific Question:   Liquid level "     Answer:   Level 0 - Thin       ACTIVITY  As tolerated.  Weight bearing as tolerated    CONSULTATIONS  Neuro  Urology   IR    PROCEDURES     PostOp Diagnosis: UTI, SPT in place        Procedure(s): SPT change with upsizing to 20F        Estimated Blood Loss: Less than 5 ml           Complications: None                []Hide copied text    []Hover for details  ROUTINE ELECTROENCEPHALOGRAM REPORT        Referring provider: Dr. Velasquez.      DOS: 9/4/2020 (total recording of 26 minutes)     INDICATION:  Kristin Balderrama 29 y.o. female presenting with visual changes.      TECHNIQUE: 30 channel routine electroencephalogram (EEG) was performed in accordance with the international 10-20 system. The study was reviewed in bipolar and referential montages. The recording examined the patient during wakeful and drowsy/sleep state(s).      DESCRIPTION OF THE RECORD:  During the wakefulness, the background showed a symmetrical 9 Hz alpha activity posteriorly with amplitude of 70 mV.  There was reactivity to eye closure/opening.  A normal anterior-posterior gradient was noted with faster beta frequencies seen anteriorly.  During drowsiness, theta/delta frequencies were seen.     During the sleep state, background shows diffuse high-amplitude 4-5 Hz delta activity.  Symmetrical high-amplitude sleep spindles and vertex sharps were seen in the leads over the central regions.      ACTIVATION PROCEDURES:   Intermittent Photic stimulation was performed in a stepwise fashion from 1 to 30 Hz and elicited a normal response (photic driving), most noticeable in the posterior leads.        ICTAL AND/OR INTERICTAL FINDINGS:   No focal or generalized epileptiform activity noted. No regional slowing was seen during this routine study.  No clinical events or seizures were reported or recorded during the study.      EKG: sampling of the EKG recording demonstrated sinus rhythm.         INTERPRETATION:  This is a normal routine EEG recording in  the awake, drowsy/sleep state(s).  Clinical correlation is recommended.     Note: A normal EEG does not rule out epilepsy.  If the clinical suspicion remains high for seizures, a prolonged recording to capture clinical or subclinical events may be helpful.               LABORATORY  Lab Results   Component Value Date    SODIUM 141 09/07/2020    POTASSIUM 3.2 (L) 09/07/2020    CHLORIDE 112 09/07/2020    CO2 15 (L) 09/07/2020    GLUCOSE 129 (H) 09/07/2020    BUN 12 09/07/2020    CREATININE 0.80 09/07/2020    CREATININE 0.75 (L) 07/20/2010    GLOMRATE >59 07/20/2010        Lab Results   Component Value Date    WBC 7.3 09/07/2020    WBC 6.1 07/20/2010    HEMOGLOBIN 12.7 09/07/2020    HEMATOCRIT 39.6 09/07/2020    PLATELETCT 170 09/07/2020        Total time of the discharge process exceeds 43 minutes.

## 2020-09-14 NOTE — PROGRESS NOTES
Pt discharged home. Discharge education complete; extra education done on care of suprapubic catheter. Pt received shower prior to discharge. Pt medications returned to pt from pharmacy. All pt belongings sent home with patient. Pt given PRN oxycodone prior to transfer home per pt request. IV access removed and pt taken off tele. PT brought down in wheelchair to meet mom by this RN at [facility].

## 2020-09-14 NOTE — CARE PLAN
Problem: Communication  Goal: The ability to communicate needs accurately and effectively will improve  Outcome: PROGRESSING AS EXPECTED     Problem: Safety  Goal: Will remain free from injury  Outcome: PROGRESSING AS EXPECTED  Goal: Will remain free from falls  Outcome: PROGRESSING AS EXPECTED  Note: Fall precautions in place.      Problem: Venous Thromboembolism (VTW)/Deep Vein Thrombosis (DVT) Prevention:  Goal: Patient will participate in Venous Thrombosis (VTE)/Deep Vein Thrombosis (DVT)Prevention Measures  Outcome: PROGRESSING AS EXPECTED  Flowsheets  Taken 9/13/2020 1950 by Nidia Tapia RLESLYE  Risk Assessment Score: 2  VTE RISK: Moderate  Mechanical Prophylaxis: SCDs, Sequential Compression Device  SCDs, Sequential Compression Device: Refused  Pharmacologic Prophylaxis Used: Contraindicated per MD  Taken 9/13/2020 0715 by Aziza Del Rio REffieNEffie  GLORIA Hose (Graduated Compression Stockings): Refused

## 2020-09-14 NOTE — DISCHARGE PLANNING
Anticipated Discharge Disposition: Home    Action: Norbertow spoke with MD who stated that pt is mc and will be discharged back home. Norbertow spoke with jose raul and updated her that pt is medically cleared and anticipated to go home today. Norbertow explained to grandma that all SNF facilities have declined and she no longer requires acute medical care, she is ambulating and transferring independently. Grandma was not happy with update stating that she cannot care for her and she will be back in the hospital soon because she will fall. Grandma also expressed concerns that her catheter is infected and would like to speak to [facility]. Grandma continued to question why she can't stay in the hospital until there was a placement for her, Lsw continued to reiterate that she is capable to go home and no longer requires acute care, Grandma stated that there is no other family and the only time she will be able to  pt is at 1700.     Barriers to Discharge: Transportation    Plan: Gregor updated RN

## 2020-09-14 NOTE — DISCHARGE INSTRUCTIONS
Discharge Instructions    Discharged to home by car with relative. Discharged via wheelchair, hospital escort: Yes.  Special equipment needed: Walker and Wheelchair    Be sure to schedule a follow-up appointment with your primary care doctor or any specialists as instructed.     Discharge Plan:   Diet Plan: Discussed  Activity Level: Discussed  Confirmed Follow up Appointment: Appointment Scheduled  Confirmed Symptoms Management: Discussed  Medication Reconciliation Updated: Yes    I understand that a diet low in cholesterol, fat, and sodium is recommended for good health. Unless I have been given specific instructions below for another diet, I accept this instruction as my diet prescription.   Other diet: Diabetic    Special Instructions: None    · Is patient discharged on Warfarin / Coumadin? No     Depression / Suicide Risk    As you are discharged from this [facility] facility, it is important to learn how to keep safe from harming yourself.    Recognize the warning signs:  · Abrupt changes in personality, positive or negative- including increase in energy   · Giving away possessions  · Change in eating patterns- significant weight changes-  positive or negative  · Change in sleeping patterns- unable to sleep or sleeping all the time   · Unwillingness or inability to communicate  · Depression  · Unusual sadness, discouragement and loneliness  · Talk of wanting to die  · Neglect of personal appearance   · Rebelliousness- reckless behavior  · Withdrawal from people/activities they love  · Confusion- inability to concentrate     If you or a loved one observes any of these behaviors or has concerns about self-harm, here's what you can do:  · Talk about it- your feelings and reasons for harming yourself  · Remove any means that you might use to hurt yourself (examples: pills, rope, extension cords, firearm)  · Get professional help from the community (Mental Health, Substance Abuse, psychological counseling)  · Do  not be alone:Call your Safe Contact- someone whom you trust who will be there for you.  · Call your local CRISIS HOTLINE 527-8933 or 050-697-8057  · Call your local Children's Mobile Crisis Response Team [place] (086) 309-3184 or www.[employer]  · Call the toll free National Suicide Prevention Hotlines   · National Suicide Prevention Lifeline 409-884-DADZ (4485)  · National Hope Line Network 800-SUICIDE (643-6528)      Suprapubic Catheter Home Guide  A suprapubic catheter is a flexible tube that is used to drain urine from the bladder into a collection bag outside the body. The catheter is inserted into the bladder through a small opening in the lower abdomen, above the pubic bone (suprapubic area) and a few inches below your belly button (navel). A tiny balloon filled with germ-free (sterile) water helps to keep the catheter in place.  The collection bag must be emptied at least once a day and cleaned at least every other day. The collection bag can be put beside your bed at night and attached to your leg during the day. You may have a large collection bag to use at night and a smaller one to use during the day.  Your suprapubic catheter may need to be changed every 4-6 weeks, or as often as recommended by your health care provider. Healing of the tract where the catheter is placed can take 6 weeks to 6 months. During that time, your health care provider may change your catheter. Once the tract is well healed, you or a caregiver will change your suprapubic catheter at home.  What are the risks?  This catheter is safe to use. However, problems can occur, including:  · Blocked urine flow. This can occur if the catheter stops working, or if you have a blood clot in your bladder or in the catheter.  · Irritation of the skin around the catheter.  · Infection. This can happen if bacteria gets into your bladder.  Supplies needed:  · Two pairs of sterile gloves.  · Paper towels.  · Catheter.  · Two  syringes.  · Sterile water.  · Sterile cleaning solution.  · Lubricant.  · Collection bags.  How to change the catheter    1. Drink plenty of fluids during the hours before you change the catheter.  2. Wash your hands with soap and water. If soap and water are not available, use hand .  3. Draw up sterile water into a syringe to have ready to fill the new catheter balloon. The amount will depend on the size of the balloon.  4. Have all of your supplies ready and close to you on a paper towel.  5. Lie on your back, sitting slightly upright so that you can see the catheter and opening.  6. Put on sterile gloves.  7. Clean the skin around the catheter opening using the sterile cleaning solution.  8. Remove the water from the balloon in the catheter using a syringe.  9. Slowly remove the catheter. If the catheter seems stuck, or if you have difficulty removing it:  ? Do not pull on it.  ? Call your health care provider right away.  10. Place the old catheter on a paper towel to discard later.  11. Take off the used gloves, and put on a new pair.  12. Put lubricant on the end of the new catheter that will go into your bladder.  13. Clean the skin around the catheter opening using the sterile cleaning solution.  14. Gently slide the catheter through the opening in your abdomen and into the tract that leads to your bladder.  15. Wait for some urine to start flowing through the catheter.  16. When urine starts to flow through the catheter, attach the collection bag to the end of the catheter. Make sure the connection is tight.  17. Use a syringe to fill the catheter balloon with sterile water. Fill to the amount directed by your health care provider.  18. Remove the gloves and wash your hands with soap and water.  How to care for the skin around the catheter  Follow your health care provider's instructions on caring for your skin.  · Use a clean washcloth and soapy water to clean the skin around your catheter every  day. Pat the area dry with a clean paper towel.  · Do not pull on the catheter.  · Do not use ointment or lotion on this area, unless told by your health care provider.  · Check the skin around the catheter every day for signs of infection. Check for:  ? Redness, swelling, or pain.  ? Fluid or blood.  ? Warmth.  ? Pus or a bad smell.  How to empty and clean the collection bag  Empty the large collection bag every 8 hours. Empty the small collection bag when it is about ? full. Clean the collection bag every 2-3 days, or as often as told by your health care provider. To do this:  1. Wash your hands with soap and water. If soap and water are not available, use hand .  2. Disconnect the bag from the catheter and immediately attach a new bag to the catheter.  3. Hold the used bag over the toilet or another container.  4. Turn the valve (spigot) at the bottom of the bag to empty the urine. Empty the used bag completely.  ? Do not touch the opening of the spigot.  ? Do not let the opening touch the toilet or container.  5. Close the spigot tightly when the bag is empty.  6. Clean the used bag in one of the following methods:  ? According to the 's instructions.  ? As told by your health care provider.  7. Let the bag dry completely. Put it in a clean plastic bag before storing it.  General tips    · Always wash your hands before and after caring for your catheter and collection bag. Use a mild, fragrance-free soap. If soap and water are not available, use hand .  · Clean the outside of the catheter with soap and water as often as told by your health care provider.  · Always make sure there are no twists or kinks in the catheter tube.  · Always make sure there are no leaks in the catheter or collection bag.  · Always wear the leg bag below your knee.  · Make sure the overnight drainage bag is always lower than the level of your bladder, but do not let it touch the floor. Before you go to  sleep, hang the bag inside a wastebasket that is covered by a clean plastic bag.  · Drink enough fluid to keep your urine pale yellow.  · Do not take baths, swim, or use a hot tub until your health care provider approves. Ask your health care provider if you may take showers.  Contact a melissa care provider if:  · You leak urine.  · You have redness, swelling, or pain around your catheter.  · You have fluid or blood coming from your catheter opening.  · Your catheter opening feels warm to the touch.  · You have pus or a bad smell coming from your catheter opening.  · You have a fever or chills.  · Your urine flow slows down.  · Your urine becomes cloudy or smelly.  Get help right away if:  · Your catheter comes out.  · You have:  ? Nausea.  ? Back pain.  ? Difficulty changing your catheter.  ? Blood in your urine.  ? No urine flow for 1 hour.  Summary  · A suprapubic catheter is a flexible tube that is used to drain urine from the bladder into a collection bag outside the body.  · Your suprapubic catheter may need to be changed every 4-6 weeks, or as recommended by your health care provider.  · Follow instructions on how to change the catheter and how to empty and clean the collection bag.  · Always wash your hands before and after caring for your catheter and collection bag. Drink enough fluid to keep your urine pale yellow.  · Get help right away if you have difficulty changing your catheter or if there is blood in your urine.  This information is not intended to replace advice given to you by your health care provider. Make sure you discuss any questions you have with your health care provider.  Document Released: 09/04/2012 Document Revised: 04/09/2020 Document Reviewed: 01/22/2020  Elsevier Patient Education © 2020 [employer]

## 2020-09-14 NOTE — PROGRESS NOTES
Pt. Requested pain meds while actually falling asleep mid sentence. Pt. is too sedated for more at this time.

## 2020-09-15 ENCOUNTER — PATIENT OUTREACH (OUTPATIENT)
Dept: MEDICAL GROUP | Facility: MEDICAL CENTER | Age: 30
End: 2020-09-15

## 2020-09-19 ENCOUNTER — HOSPITAL ENCOUNTER (OUTPATIENT)
Facility: MEDICAL CENTER | Age: 30
End: 2020-09-19
Attending: PHYSICIAN ASSISTANT
Payer: MEDICARE

## 2020-09-19 ENCOUNTER — OFFICE VISIT (OUTPATIENT)
Dept: URGENT CARE | Facility: CLINIC | Age: 30
End: 2020-09-19
Payer: MEDICARE

## 2020-09-19 VITALS
DIASTOLIC BLOOD PRESSURE: 80 MMHG | HEART RATE: 81 BPM | WEIGHT: 255 LBS | HEIGHT: 65 IN | OXYGEN SATURATION: 96 % | TEMPERATURE: 98.2 F | SYSTOLIC BLOOD PRESSURE: 112 MMHG | RESPIRATION RATE: 18 BRPM | BODY MASS INDEX: 42.49 KG/M2

## 2020-09-19 DIAGNOSIS — R35.0 FREQUENT URINATION: ICD-10-CM

## 2020-09-19 DIAGNOSIS — R30.0 DYSURIA: ICD-10-CM

## 2020-09-19 DIAGNOSIS — Z87.440 HISTORY OF RECURRENT UTI (URINARY TRACT INFECTION): ICD-10-CM

## 2020-09-19 DIAGNOSIS — K13.70 ORAL LESION: ICD-10-CM

## 2020-09-19 LAB
APPEARANCE UR: NORMAL
BILIRUB UR STRIP-MCNC: NEGATIVE MG/DL
COLOR UR AUTO: NORMAL
GLUCOSE UR STRIP.AUTO-MCNC: NEGATIVE MG/DL
KETONES UR STRIP.AUTO-MCNC: NEGATIVE MG/DL
LEUKOCYTE ESTERASE UR QL STRIP.AUTO: NORMAL
NITRITE UR QL STRIP.AUTO: POSITIVE
PH UR STRIP.AUTO: 6 [PH] (ref 5–8)
PROT UR QL STRIP: 100 MG/DL
RBC UR QL AUTO: NORMAL
SP GR UR STRIP.AUTO: 1.03
UROBILINOGEN UR STRIP-MCNC: 0.2 MG/DL

## 2020-09-19 PROCEDURE — 99214 OFFICE O/P EST MOD 30 MIN: CPT | Performed by: PHYSICIAN ASSISTANT

## 2020-09-19 PROCEDURE — 81002 URINALYSIS NONAUTO W/O SCOPE: CPT | Performed by: PHYSICIAN ASSISTANT

## 2020-09-19 RX ORDER — CIPROFLOXACIN 500 MG/1
TABLET, FILM COATED ORAL
COMMUNITY
Start: 2020-07-11 | End: 2020-09-21

## 2020-09-19 RX ORDER — NITROFURANTOIN 25; 75 MG/1; MG/1
100 CAPSULE ORAL EVERY 12 HOURS
Qty: 10 CAP | Refills: 0 | Status: ON HOLD | OUTPATIENT
Start: 2020-09-19 | End: 2020-09-26

## 2020-09-19 RX ORDER — DEXAMETHASONE SODIUM PHOSPHATE 10 MG/ML
INJECTION, SOLUTION INTRAMUSCULAR; INTRAVENOUS
COMMUNITY
Start: 2020-07-07 | End: 2020-09-21

## 2020-09-19 RX ORDER — MORPHINE SULFATE 4 MG/ML
INJECTION, SOLUTION INTRAMUSCULAR; INTRAVENOUS
COMMUNITY
Start: 2020-07-24 | End: 2020-09-21

## 2020-09-19 RX ORDER — ERGOCALCIFEROL 1.25 MG/1
50000 CAPSULE ORAL
COMMUNITY
Start: 2020-07-17 | End: 2021-05-03

## 2020-09-19 RX ORDER — LIDOCAINE HYDROCHLORIDE 20 MG/ML
5 SOLUTION OROPHARYNGEAL PRN
Qty: 120 ML | Refills: 0 | Status: SHIPPED
Start: 2020-09-19 | End: 2020-12-16

## 2020-09-19 RX ORDER — ONDANSETRON HYDROCHLORIDE 8 MG/1
TABLET, FILM COATED ORAL
COMMUNITY
Start: 2020-07-11 | End: 2020-09-21

## 2020-09-19 ASSESSMENT — ENCOUNTER SYMPTOMS
ABDOMINAL PAIN: 0
DIARRHEA: 0
CHILLS: 0
BLOOD IN STOOL: 0
FLANK PAIN: 0
NAUSEA: 0
VOMITING: 0
FEVER: 0

## 2020-09-19 ASSESSMENT — FIBROSIS 4 INDEX: FIB4 SCORE: 0.28

## 2020-09-19 NOTE — PROGRESS NOTES
"Subjective:   Kristin Balderrama  is a 29 y.o. female who presents for Sore Throat (sores on togue ) and Other (pain in bladder, finished abx)      Other  This is a new problem. The current episode started yesterday. Pertinent negatives include no abdominal pain ( cramping), chills, fever, nausea, rash or vomiting.   Patient comes to clinic with grandmother present.  Notes some discomfort catheter site concerning for possible recurrent urinary tract infection.  Complains of \"bladder cramping\" as well as urinary frequency and dysuria.  Patient was discharged from hospital last week with Bactrim x5 days for UTI, she does have a suprapubic catheter.  She additionally complains of new onset oral lesions over the last few days as well.  Patient has very complicated historical medical care with significant polypharmacy.  She does have a follow-up appointment with primary care in 3 days.  She denies fevers chills cough or ear pain.  She complains of sore throat, body aches and urinary symptoms.    Review of Systems   Constitutional: Negative for chills and fever.   Gastrointestinal: Negative for abdominal pain ( cramping), blood in stool, diarrhea, melena, nausea and vomiting.   Genitourinary: Positive for dysuria and frequency. Negative for flank pain, hematuria and urgency.   Skin: Negative for rash.       Allergies   Allergen Reactions   • Depakote [Divalproex Sodium] Unspecified     Muscle spasms/muscle pain and weakness     • Amitriptyline Unspecified     Headaches     • Aripiprazole [Abilify] Unspecified     Headaches/muscle twitching     • Clindamycin Nausea     Even with food     • Flagyl [Metronidazole Hcl] Unspecified     \"eye problems\"     • Flomax [Tamsulosin Hydrochloride] Swelling   • Levaquin Unspecified     Severe muscle cramps in legs  RXN=unknown   • Metformin Unspecified     Increased lactic acid      • Tape Rash     Tears skin off  coban with Tegaderm tape ok intermittently  RXN=ongoing   • Vancomycin " "Itching     Pt becomes flushed in face and chest.   RXN=7/10/16   • Wound Dressing Adhesive Hives     By pt report   • Ciprofloxacin    • Hydromorphone Hcl      Pt states she feels loopy   • Keflex Rash     Pt states she gets a rash with this medication  Tolerates ceftriaxone   • Levofloxacin Unspecified     Leg muscle cramps   • Metronidazole Rash     .   • Penicillins    • Tamsulosin    • Valproic Acid Rash     .        Objective:   /80 (Patient Position: Sitting)   Pulse 81   Temp 36.8 °C (98.2 °F) (Temporal)   Resp 18   Ht 1.651 m (5' 5\")   Wt 115.7 kg (255 lb)   SpO2 96%   BMI 42.43 kg/m²     Physical Exam  Vitals signs and nursing note reviewed.   Constitutional:       General: She is not in acute distress.     Appearance: Normal appearance. She is well-developed. She is not diaphoretic.   HENT:      Head: Normocephalic and atraumatic.      Right Ear: External ear normal.      Left Ear: External ear normal.      Nose: Nose normal.      Mouth/Throat:      Mouth: Mucous membranes are dry. No oral lesions.      Tongue: Lesions present. Tongue does not deviate from midline.      Pharynx: Uvula midline. No pharyngeal swelling, oropharyngeal exudate, posterior oropharyngeal erythema or uvula swelling.      Tonsils: No tonsillar exudate.   Eyes:      General: Lids are normal. No scleral icterus.        Right eye: No discharge.         Left eye: No discharge.      Conjunctiva/sclera: Conjunctivae normal.   Neck:      Musculoskeletal: Neck supple.   Pulmonary:      Effort: Pulmonary effort is normal. No respiratory distress.   Abdominal:      General: Abdomen is protuberant.      Palpations: Abdomen is soft. Abdomen is not rigid.      Tenderness: There is no right CVA tenderness or left CVA tenderness.      Comments: Trace evidence of purulent discharge at catheter site, no extension of erythema; very similar description in discharge summary   Musculoskeletal: Normal range of motion.   Skin:     General: " Skin is warm and dry.      Coloration: Skin is not pale.      Findings: No erythema.   Neurological:      Mental Status: She is alert and oriented to person, place, and time. She is not disoriented.   Psychiatric:         Speech: Speech normal.         Behavior: Behavior normal.       Results for orders placed or performed in visit on 09/19/20   POCT Urinalysis   Result Value Ref Range    POC Color dark yellow Negative    POC Appearance turbid Negative    POC Leukocyte Esterase small Negative    POC Nitrites positive Negative    POC Urobiligen 0.2 Negative (0.2) mg/dL    POC Protein 100 Negative mg/dL    POC Urine PH 6.0 5.0 - 8.0    POC Blood large Negative    POC Specific Gravity 1.030 <1.005 - >1.030    POC Ketones negative Negative mg/dL    POC Bilirubin negative Negative mg/dL    POC Glucose negative Negative mg/dL     *Note: Due to a large number of results and/or encounters for the requested time period, some results have not been displayed. A complete set of results can be found in Results Review.     Urine cx pending    Assessment/Plan:   1. Frequent urination  - nitrofurantoin (MACROBID) 100 MG Cap; Take 1 Cap by mouth every 12 hours for 5 days.  Dispense: 10 Cap; Refill: 0    2. Dysuria  - nitrofurantoin (MACROBID) 100 MG Cap; Take 1 Cap by mouth every 12 hours for 5 days.  Dispense: 10 Cap; Refill: 0    3. History of recurrent UTI (urinary tract infection)  - nitrofurantoin (MACROBID) 100 MG Cap; Take 1 Cap by mouth every 12 hours for 5 days.  Dispense: 10 Cap; Refill: 0    4. Oral lesion    Other orders  - ciprofloxacin (CIPRO) 500 MG Tab  - dexamethasone pf (DECADRON) 10 MG/ML Solution  - Diclofenac Sodium 1 % Gel  - vitamin D, Ergocalciferol, (DRISDOL) 1.25 MG (22351 UT) Cap capsule  - Morphine Sulfate (MORPHINE, PF,) 4 MG/ML Solution  - ondansetron (ZOFRAN) 8 MG Tab  - POCT Urinalysis  - URINE CULTURE(NEW); Future  - lidocaine (XYLOCAINE) 2 % Solution; Take 5 mL by mouth as needed for Throat/Mouth  Pain (q6hr PRN throat pain, ok to rinse and spit or swallow).  Dispense: 120 mL; Refill: 0  Supportive care is reviewed with patient/caregiver - recommend to push PO fluids and electrolytes, nsaids/tylenol, rest, full course of abx, probiotics w/ abx, azo/cran, observe for resolution  Follow-up with Dr. Brody next week  Return to clinic with lack of resolution or progression of symptoms.  With degree of complexity of medical history, medications, allergies patient is directed to the ER with any worsening or persistence    I have worn an N95 mask, gloves and eye protection for the entire encounter with this patient.     Differential diagnosis, natural history, supportive care, and indications for immediate follow-up discussed.

## 2020-09-21 ENCOUNTER — HOSPITAL ENCOUNTER (INPATIENT)
Facility: MEDICAL CENTER | Age: 30
LOS: 3 days | DRG: 699 | End: 2020-09-26
Attending: EMERGENCY MEDICINE | Admitting: INTERNAL MEDICINE
Payer: MEDICARE

## 2020-09-21 LAB
ALBUMIN SERPL BCP-MCNC: 4.5 G/DL (ref 3.2–4.9)
ALBUMIN/GLOB SERPL: 2.4 G/DL
ALP SERPL-CCNC: 79 U/L (ref 30–99)
ALT SERPL-CCNC: 21 U/L (ref 2–50)
AMORPH CRY #/AREA URNS HPF: PRESENT /HPF
ANION GAP SERPL CALC-SCNC: 17 MMOL/L (ref 7–16)
APPEARANCE UR: ABNORMAL
AST SERPL-CCNC: 10 U/L (ref 12–45)
BACTERIA #/AREA URNS HPF: NEGATIVE /HPF
BASOPHILS # BLD AUTO: 0.7 % (ref 0–1.8)
BASOPHILS # BLD: 0.05 K/UL (ref 0–0.12)
BILIRUB SERPL-MCNC: 0.2 MG/DL (ref 0.1–1.5)
BILIRUB UR QL STRIP.AUTO: NEGATIVE
BUN SERPL-MCNC: 11 MG/DL (ref 8–22)
CALCIUM SERPL-MCNC: 9.6 MG/DL (ref 8.5–10.5)
CHLORIDE SERPL-SCNC: 111 MMOL/L (ref 96–112)
CO2 SERPL-SCNC: 13 MMOL/L (ref 20–33)
COLOR UR: YELLOW
COVID ORDER STATUS COVID19: NORMAL
CREAT SERPL-MCNC: 0.8 MG/DL (ref 0.5–1.4)
EOSINOPHIL # BLD AUTO: 0.37 K/UL (ref 0–0.51)
EOSINOPHIL NFR BLD: 5.2 % (ref 0–6.9)
EPI CELLS #/AREA URNS HPF: ABNORMAL /HPF
ERYTHROCYTE [DISTWIDTH] IN BLOOD BY AUTOMATED COUNT: 47 FL (ref 35.9–50)
GLOBULIN SER CALC-MCNC: 1.9 G/DL (ref 1.9–3.5)
GLUCOSE SERPL-MCNC: 114 MG/DL (ref 65–99)
GLUCOSE UR STRIP.AUTO-MCNC: NEGATIVE MG/DL
HCG SERPL QL: NEGATIVE
HCT VFR BLD AUTO: 43.7 % (ref 37–47)
HGB BLD-MCNC: 13.9 G/DL (ref 12–16)
HYALINE CASTS #/AREA URNS LPF: ABNORMAL /LPF
IMM GRANULOCYTES # BLD AUTO: 0.02 K/UL (ref 0–0.11)
IMM GRANULOCYTES NFR BLD AUTO: 0.3 % (ref 0–0.9)
KETONES UR STRIP.AUTO-MCNC: NEGATIVE MG/DL
LEUKOCYTE ESTERASE UR QL STRIP.AUTO: ABNORMAL
LYMPHOCYTES # BLD AUTO: 1.58 K/UL (ref 1–4.8)
LYMPHOCYTES NFR BLD: 22.3 % (ref 22–41)
MCH RBC QN AUTO: 29 PG (ref 27–33)
MCHC RBC AUTO-ENTMCNC: 31.8 G/DL (ref 33.6–35)
MCV RBC AUTO: 91 FL (ref 81.4–97.8)
MICRO URNS: ABNORMAL
MONOCYTES # BLD AUTO: 0.65 K/UL (ref 0–0.85)
MONOCYTES NFR BLD AUTO: 9.2 % (ref 0–13.4)
NEUTROPHILS # BLD AUTO: 4.4 K/UL (ref 2–7.15)
NEUTROPHILS NFR BLD: 62.3 % (ref 44–72)
NITRITE UR QL STRIP.AUTO: NEGATIVE
NRBC # BLD AUTO: 0 K/UL
NRBC BLD-RTO: 0 /100 WBC
PH UR STRIP.AUTO: 5.5 [PH] (ref 5–8)
PLATELET # BLD AUTO: 199 K/UL (ref 164–446)
PMV BLD AUTO: 12.1 FL (ref 9–12.9)
POTASSIUM SERPL-SCNC: 3.3 MMOL/L (ref 3.6–5.5)
PROCALCITONIN SERPL-MCNC: <0.05 NG/ML
PROT SERPL-MCNC: 6.4 G/DL (ref 6–8.2)
PROT UR QL STRIP: >=1000 MG/DL
RBC # BLD AUTO: 4.8 M/UL (ref 4.2–5.4)
RBC # URNS HPF: ABNORMAL /HPF
RBC UR QL AUTO: ABNORMAL
SARS-COV-2 RNA RESP QL NAA+PROBE: NOTDETECTED
SODIUM SERPL-SCNC: 141 MMOL/L (ref 135–145)
SP GR UR STRIP.AUTO: 1.01
SPECIMEN SOURCE: NORMAL
UROBILINOGEN UR STRIP.AUTO-MCNC: 0.2 MG/DL
WBC # BLD AUTO: 7.1 K/UL (ref 4.8–10.8)
WBC #/AREA URNS HPF: ABNORMAL /HPF

## 2020-09-21 PROCEDURE — 81001 URINALYSIS AUTO W/SCOPE: CPT

## 2020-09-21 PROCEDURE — 80053 COMPREHEN METABOLIC PANEL: CPT

## 2020-09-21 PROCEDURE — 87077 CULTURE AEROBIC IDENTIFY: CPT

## 2020-09-21 PROCEDURE — A9270 NON-COVERED ITEM OR SERVICE: HCPCS | Performed by: INTERNAL MEDICINE

## 2020-09-21 PROCEDURE — 99220 PR INITIAL OBSERVATION CARE,LEVL III: CPT | Performed by: INTERNAL MEDICINE

## 2020-09-21 PROCEDURE — 87186 SC STD MICRODIL/AGAR DIL: CPT

## 2020-09-21 PROCEDURE — 96375 TX/PRO/DX INJ NEW DRUG ADDON: CPT

## 2020-09-21 PROCEDURE — 36415 COLL VENOUS BLD VENIPUNCTURE: CPT

## 2020-09-21 PROCEDURE — C9803 HOPD COVID-19 SPEC COLLECT: HCPCS | Performed by: INTERNAL MEDICINE

## 2020-09-21 PROCEDURE — 87040 BLOOD CULTURE FOR BACTERIA: CPT

## 2020-09-21 PROCEDURE — 99285 EMERGENCY DEPT VISIT HI MDM: CPT

## 2020-09-21 PROCEDURE — 85025 COMPLETE CBC W/AUTO DIFF WBC: CPT

## 2020-09-21 PROCEDURE — 700102 HCHG RX REV CODE 250 W/ 637 OVERRIDE(OP): Performed by: INTERNAL MEDICINE

## 2020-09-21 PROCEDURE — 700102 HCHG RX REV CODE 250 W/ 637 OVERRIDE(OP): Performed by: EMERGENCY MEDICINE

## 2020-09-21 PROCEDURE — 700111 HCHG RX REV CODE 636 W/ 250 OVERRIDE (IP): Performed by: EMERGENCY MEDICINE

## 2020-09-21 PROCEDURE — 84145 PROCALCITONIN (PCT): CPT

## 2020-09-21 PROCEDURE — A9270 NON-COVERED ITEM OR SERVICE: HCPCS | Performed by: EMERGENCY MEDICINE

## 2020-09-21 PROCEDURE — 96365 THER/PROPH/DIAG IV INF INIT: CPT

## 2020-09-21 PROCEDURE — 87086 URINE CULTURE/COLONY COUNT: CPT

## 2020-09-21 PROCEDURE — G0378 HOSPITAL OBSERVATION PER HR: HCPCS

## 2020-09-21 PROCEDURE — 700105 HCHG RX REV CODE 258: Performed by: EMERGENCY MEDICINE

## 2020-09-21 PROCEDURE — U0003 INFECTIOUS AGENT DETECTION BY NUCLEIC ACID (DNA OR RNA); SEVERE ACUTE RESPIRATORY SYNDROME CORONAVIRUS 2 (SARS-COV-2) (CORONAVIRUS DISEASE [COVID-19]), AMPLIFIED PROBE TECHNIQUE, MAKING USE OF HIGH THROUGHPUT TECHNOLOGIES AS DESCRIBED BY CMS-2020-01-R: HCPCS

## 2020-09-21 PROCEDURE — 84703 CHORIONIC GONADOTROPIN ASSAY: CPT

## 2020-09-21 RX ORDER — ONDANSETRON 2 MG/ML
4 INJECTION INTRAMUSCULAR; INTRAVENOUS EVERY 4 HOURS PRN
Status: DISCONTINUED | OUTPATIENT
Start: 2020-09-21 | End: 2020-09-26 | Stop reason: HOSPADM

## 2020-09-21 RX ORDER — SODIUM CHLORIDE 9 MG/ML
1000 INJECTION, SOLUTION INTRAVENOUS ONCE
Status: COMPLETED | OUTPATIENT
Start: 2020-09-21 | End: 2020-09-21

## 2020-09-21 RX ORDER — PROMETHAZINE HYDROCHLORIDE 25 MG/1
12.5-25 SUPPOSITORY RECTAL EVERY 4 HOURS PRN
Status: DISCONTINUED | OUTPATIENT
Start: 2020-09-21 | End: 2020-09-26 | Stop reason: HOSPADM

## 2020-09-21 RX ORDER — PROCHLORPERAZINE EDISYLATE 5 MG/ML
5-10 INJECTION INTRAMUSCULAR; INTRAVENOUS EVERY 4 HOURS PRN
Status: DISCONTINUED | OUTPATIENT
Start: 2020-09-21 | End: 2020-09-26 | Stop reason: HOSPADM

## 2020-09-21 RX ORDER — ONDANSETRON 4 MG/1
4 TABLET, ORALLY DISINTEGRATING ORAL EVERY 4 HOURS PRN
Status: DISCONTINUED | OUTPATIENT
Start: 2020-09-21 | End: 2020-09-26 | Stop reason: HOSPADM

## 2020-09-21 RX ORDER — MORPHINE SULFATE 4 MG/ML
2 INJECTION, SOLUTION INTRAMUSCULAR; INTRAVENOUS EVERY 4 HOURS PRN
Status: DISCONTINUED | OUTPATIENT
Start: 2020-09-21 | End: 2020-09-24

## 2020-09-21 RX ORDER — SODIUM BICARBONATE 650 MG/1
650 TABLET ORAL 4 TIMES DAILY
Status: DISCONTINUED | OUTPATIENT
Start: 2020-09-21 | End: 2020-09-26 | Stop reason: HOSPADM

## 2020-09-21 RX ORDER — HYDROCODONE BITARTRATE AND ACETAMINOPHEN 5; 325 MG/1; MG/1
2 TABLET ORAL ONCE
Status: COMPLETED | OUTPATIENT
Start: 2020-09-21 | End: 2020-09-21

## 2020-09-21 RX ORDER — TRAZODONE HYDROCHLORIDE 100 MG/1
100 TABLET ORAL
Status: DISCONTINUED | OUTPATIENT
Start: 2020-09-21 | End: 2020-09-26 | Stop reason: HOSPADM

## 2020-09-21 RX ORDER — DEXTROSE MONOHYDRATE 25 G/50ML
50 INJECTION, SOLUTION INTRAVENOUS
Status: DISCONTINUED | OUTPATIENT
Start: 2020-09-21 | End: 2020-09-24

## 2020-09-21 RX ORDER — PREGABALIN 75 MG/1
300 CAPSULE ORAL 2 TIMES DAILY
Status: DISCONTINUED | OUTPATIENT
Start: 2020-09-21 | End: 2020-09-26 | Stop reason: HOSPADM

## 2020-09-21 RX ORDER — TOPIRAMATE 25 MG/1
50 TABLET ORAL 2 TIMES DAILY
Status: DISCONTINUED | OUTPATIENT
Start: 2020-09-21 | End: 2020-09-26 | Stop reason: HOSPADM

## 2020-09-21 RX ORDER — OMEPRAZOLE 20 MG/1
20 CAPSULE, DELAYED RELEASE ORAL DAILY
Status: DISCONTINUED | OUTPATIENT
Start: 2020-09-22 | End: 2020-09-26 | Stop reason: HOSPADM

## 2020-09-21 RX ORDER — GABAPENTIN 300 MG/1
300 CAPSULE ORAL 3 TIMES DAILY
Status: DISCONTINUED | OUTPATIENT
Start: 2020-09-21 | End: 2020-09-26 | Stop reason: HOSPADM

## 2020-09-21 RX ORDER — METHOCARBAMOL 750 MG/1
750 TABLET, FILM COATED ORAL 4 TIMES DAILY
Status: DISCONTINUED | OUTPATIENT
Start: 2020-09-21 | End: 2020-09-26 | Stop reason: HOSPADM

## 2020-09-21 RX ORDER — MYCOPHENOLATE MOFETIL 250 MG/1
1000 CAPSULE ORAL 2 TIMES DAILY
Status: DISCONTINUED | OUTPATIENT
Start: 2020-09-21 | End: 2020-09-26 | Stop reason: HOSPADM

## 2020-09-21 RX ORDER — ACETAZOLAMIDE 500 MG/1
CAPSULE, EXTENDED RELEASE ORAL 2 TIMES DAILY
Status: DISCONTINUED | OUTPATIENT
Start: 2020-09-21 | End: 2020-09-21

## 2020-09-21 RX ORDER — BUSPIRONE HYDROCHLORIDE 10 MG/1
10 TABLET ORAL 2 TIMES DAILY
Status: DISCONTINUED | OUTPATIENT
Start: 2020-09-21 | End: 2020-09-26 | Stop reason: HOSPADM

## 2020-09-21 RX ORDER — OXCARBAZEPINE 150 MG/1
150 TABLET, FILM COATED ORAL 2 TIMES DAILY
Status: DISCONTINUED | OUTPATIENT
Start: 2020-09-21 | End: 2020-09-26 | Stop reason: HOSPADM

## 2020-09-21 RX ORDER — ACETAZOLAMIDE 500 MG/1
1000 CAPSULE, EXTENDED RELEASE ORAL EVERY EVENING
Status: DISCONTINUED | OUTPATIENT
Start: 2020-09-22 | End: 2020-09-26 | Stop reason: HOSPADM

## 2020-09-21 RX ORDER — OXYCODONE HYDROCHLORIDE 10 MG/1
10 TABLET ORAL EVERY 4 HOURS PRN
Status: DISCONTINUED | OUTPATIENT
Start: 2020-09-21 | End: 2020-09-24

## 2020-09-21 RX ORDER — POTASSIUM CHLORIDE 20 MEQ/1
40 TABLET, EXTENDED RELEASE ORAL ONCE
Status: COMPLETED | OUTPATIENT
Start: 2020-09-21 | End: 2020-09-22

## 2020-09-21 RX ORDER — ALBUTEROL SULFATE 90 UG/1
2 AEROSOL, METERED RESPIRATORY (INHALATION) EVERY 6 HOURS PRN
Status: DISCONTINUED | OUTPATIENT
Start: 2020-09-21 | End: 2020-09-26 | Stop reason: HOSPADM

## 2020-09-21 RX ORDER — ZIPRASIDONE HYDROCHLORIDE 40 MG/1
40 CAPSULE ORAL 2 TIMES DAILY
Status: DISCONTINUED | OUTPATIENT
Start: 2020-09-21 | End: 2020-09-26 | Stop reason: HOSPADM

## 2020-09-21 RX ORDER — PHENOL 1.4 %
10 AEROSOL, SPRAY (ML) MUCOUS MEMBRANE EVERY EVENING
Status: DISCONTINUED | OUTPATIENT
Start: 2020-09-21 | End: 2020-09-21

## 2020-09-21 RX ORDER — LEVOTHYROXINE SODIUM 0.07 MG/1
75 TABLET ORAL
Status: DISCONTINUED | OUTPATIENT
Start: 2020-09-22 | End: 2020-09-26 | Stop reason: HOSPADM

## 2020-09-21 RX ORDER — ACETAZOLAMIDE 500 MG/1
1500 CAPSULE, EXTENDED RELEASE ORAL DAILY
Status: DISCONTINUED | OUTPATIENT
Start: 2020-09-22 | End: 2020-09-26 | Stop reason: HOSPADM

## 2020-09-21 RX ORDER — LABETALOL HYDROCHLORIDE 5 MG/ML
10 INJECTION, SOLUTION INTRAVENOUS EVERY 4 HOURS PRN
Status: DISCONTINUED | OUTPATIENT
Start: 2020-09-21 | End: 2020-09-26 | Stop reason: HOSPADM

## 2020-09-21 RX ORDER — SODIUM CHLORIDE 9 MG/ML
1000 INJECTION, SOLUTION INTRAVENOUS ONCE
Status: COMPLETED | OUTPATIENT
Start: 2020-09-21 | End: 2020-09-22

## 2020-09-21 RX ORDER — PROMETHAZINE HYDROCHLORIDE 25 MG/1
12.5-25 TABLET ORAL EVERY 4 HOURS PRN
Status: DISCONTINUED | OUTPATIENT
Start: 2020-09-21 | End: 2020-09-26 | Stop reason: HOSPADM

## 2020-09-21 RX ORDER — FLUOXETINE HYDROCHLORIDE 20 MG/1
40 CAPSULE ORAL DAILY
Status: DISCONTINUED | OUTPATIENT
Start: 2020-09-22 | End: 2020-09-26 | Stop reason: HOSPADM

## 2020-09-21 RX ORDER — MIDAZOLAM HYDROCHLORIDE 1 MG/ML
1 INJECTION INTRAMUSCULAR; INTRAVENOUS ONCE
Status: COMPLETED | OUTPATIENT
Start: 2020-09-21 | End: 2020-09-21

## 2020-09-21 RX ORDER — PREDNISONE 10 MG/1
10 TABLET ORAL DAILY
Status: DISCONTINUED | OUTPATIENT
Start: 2020-09-22 | End: 2020-09-26 | Stop reason: HOSPADM

## 2020-09-21 RX ORDER — OXYBUTYNIN CHLORIDE 5 MG/1
5 TABLET ORAL 3 TIMES DAILY
Status: DISCONTINUED | OUTPATIENT
Start: 2020-09-21 | End: 2020-09-26 | Stop reason: HOSPADM

## 2020-09-21 RX ADMIN — GABAPENTIN 300 MG: 300 CAPSULE ORAL at 22:30

## 2020-09-21 RX ADMIN — MEROPENEM 500 MG: 500 INJECTION, POWDER, FOR SOLUTION INTRAVENOUS at 18:30

## 2020-09-21 RX ADMIN — MIDAZOLAM HYDROCHLORIDE 1 MG: 1 INJECTION, SOLUTION INTRAMUSCULAR; INTRAVENOUS at 15:36

## 2020-09-21 RX ADMIN — BUSPIRONE HYDROCHLORIDE 10 MG: 10 TABLET ORAL at 22:30

## 2020-09-21 RX ADMIN — HYDROCODONE BITARTRATE AND ACETAMINOPHEN 2 TABLET: 5; 325 TABLET ORAL at 17:35

## 2020-09-21 RX ADMIN — SODIUM CHLORIDE 1000 ML: 9 INJECTION, SOLUTION INTRAVENOUS at 17:36

## 2020-09-21 ASSESSMENT — ENCOUNTER SYMPTOMS
BLURRED VISION: 0
COUGH: 0
SENSORY CHANGE: 0
DOUBLE VISION: 0
VOMITING: 0
CHILLS: 0
PALPITATIONS: 0
HALLUCINATIONS: 0
SHORTNESS OF BREATH: 0
DIARRHEA: 1
MYALGIAS: 0
SPUTUM PRODUCTION: 0
TINGLING: 0
FOCAL WEAKNESS: 0
ORTHOPNEA: 0
PHOTOPHOBIA: 0
NAUSEA: 0
HEADACHES: 0
TREMORS: 0
ABDOMINAL PAIN: 0
EYE PAIN: 0
NECK PAIN: 0
FEVER: 0
SPEECH CHANGE: 0
WEIGHT LOSS: 0
CONSTIPATION: 0
BACK PAIN: 0
DIZZINESS: 0

## 2020-09-21 ASSESSMENT — FIBROSIS 4 INDEX: FIB4 SCORE: 0.28

## 2020-09-21 ASSESSMENT — LIFESTYLE VARIABLES: SUBSTANCE_ABUSE: 0

## 2020-09-21 NOTE — ED PROVIDER NOTES
"ED Provider Note    CHIEF COMPLAINT  Chief Complaint   Patient presents with   • Pain     at site of suprapubic catheter   • Other     foul odor at suprapubic site with discharge       HPI  Kristin Balderrama is a 29 y.o. female with complicated PMH including h/o transverse myelitis, IDDM2, optic neuritis, schizophrenia, and seizure d/o who presents for evaluation of pain near suprapubic catheter, painful bladder spasms, and  foul-smelling urine with debris. She reports h/o urinary retention initially managed by jara catheters and had suprapubic catheter placed on 8/6/2020. She was recently hospitalized at [facility] (8/15/2020-9/14/2020) for debility and found to have bacteriuria growing ESBL (Klebsiella). She completed a 7 day course of IV meropenem on 8/26 and was treated with 5 day course of Diflucan due to budding yeast noted on repeat UA. Her hospital course was prolonged as patient was not accepted by any LTACs. She states she was discharged with Bactrim and felt like she was at her baseline for about 1 week prior to initiation of pain at suprapubic catheter, foul smelling urine, and painful bladder spasms. She was seen at urgent care 3 days ago for urinary discomfort and was prescribed macrobid, however, urine culture was cancelled. She was also evaluated for oral lesions and recommended lidocaine solutions which she has not filled yet.     In addition to urinary symptoms, patient also reports chills, temperature to 101 F yesterday, and R flank pain.     REVIEW OF SYSTEMS  See HPI for further details. All other systems are negative.     PAST MEDICAL HISTORY  Past Medical History:   Diagnosis Date   • Other fatigue 6/29/2020   • Sinus tachycardia 10/31/2013   • Pain 08-15-12    back, 7/10   • Pneumonia 2012   • Chronic UTI 9/18/2010   • Abdominal pain    • Anginal syndrome     random chest pain especially with tachycardia   • Apnea, sleep    • Arrhythmia     \"sinus tachycardia\", cariologist, Dr. Kumar; ablation " "2/2016   • Arthritis     osteo   • ASTHMA     when around smoke   • Atrial fibrillation (HCC)    • Back pain    • Borderline personality disorder (HCC)    • Breath shortness     with tachycardia   • Bronchitis    • Cardiac arrhythmia    • Chickenpox    • Cough    • Daytime sleepiness    • Depression    • Diabetes (HCC)    • Diarrhea    • Disorder of thyroid    • Fall    • Fatigue    • Frequent headaches    • Gasping for breath    • Gynecological disorder     PCOS   • Headache(784.0)    • Heart burn    • History of falling    • Hypertension    • Indigestion    • Migraine    • 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HCC)    • 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HCC)    • Nausea    • Obesity    • Painful joint    • PCOS (polycystic ovarian syndrome)    • Psychosis (HCC)    • Renal disorder     \"kidney disease, stage 1\" nephrologist, Dr. 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   • Urinary bladder disorder     Suprapubic cath   • Urinary incontinence    • Weakness    • Wears glasses        FAMILY HISTORY  Non-contributory    SOCIAL HISTORY  Social History     Socioeconomic History   • Marital status: Single     Spouse name: Not on file   • Number of children: Not on file   • Years of education: Not on file   • Highest education level: Not on file   Occupational History   • Not on file   Social Needs   • Financial resource strain: Not on file   • Food insecurity     Worry: Not on file     Inability: Not on file   • Transportation needs     Medical: Not on file     Non-medical: Not on file   Tobacco Use   • Smoking status: Never Smoker   • Smokeless tobacco: Never Used   Substance and Sexual Activity   • Alcohol use: No     Alcohol/week: 0.0 oz   • Drug use: Not Currently     Frequency: 7.0 times per week     Types: Marijuana "   • Sexual activity: Not Currently     Birth control/protection: Pill   Lifestyle   • Physical activity     Days per week: Not on file     Minutes per session: Not on file   • Stress: Not on file   Relationships   • Social connections     Talks on phone: Not on file     Gets together: Not on file     Attends Druze service: Not on file     Active member of club or organization: Not on file     Attends meetings of clubs or organizations: Not on file     Relationship status: Not on file   • Intimate partner violence     Fear of current or ex partner: Not on file     Emotionally abused: Not on file     Physically abused: Not on file     Forced sexual activity: Not on file   Other Topics Concern   • Not on file   Social History Narrative    ** Merged History Encounter **            SURGICAL HISTORY  Past Surgical History:   Procedure Laterality Date   • MUSCLE BIOPSY Right 1/26/2017    Procedure: MUSCLE BIOPSY - THIGH;  Surgeon: Isidro Vigil M.D.;  Location: [facility];  Service:    • GASTROSCOPY WITH BALLOON DILATATION N/A 1/4/2017    Procedure: GASTROSCOPY WITH DILATATION;  Surgeon: Torres Vargas M.D.;  Location: [facility];  Service:    • BOWEL STIMULATOR INSERTION  7/13/2016    Procedure: BOWEL STIMULATOR INSERTION FOR PERMANENT INTERSTIM SACRAL IMPLANT;  Surgeon: Joe Noyola M.D.;  Location: SURGERY [facility];  Service:    • RECOVERY  1/27/2016    Procedure: CATH LAB EP STUDY WITH SINUS NODE MODIFICATION LA;  Surgeon: Recoveryonly Surgery;  Location: SURGERY PRE-POST PROC UNIT [facility];  Service:    • OTHER CARDIAC SURGERY  1/2016    cardiac ablation   • NEURO DEST FACET L/S W/IG SNGL  4/21/2015    Performed by Reza Tabor at [facility]   • LUMBAR FUSION ANTERIOR  8/21/2012    Performed by SUSIE SAWANT at [facility]   • ALYSSA BY LAPAROSCOPY  8/29/2010    Performed by SHAYY JOHNS at [facility]   • LAMINOTOMY     • OTHER  ABDOMINAL SURGERY     • TONSILLECTOMY      tonsillectomy       CURRENT MEDICATIONS  Home Medications     Reviewed by Ulisses Alatorre (Pharmacy Tech) on 09/21/20 at 1355  Med List Status: <None>   Medication Last Dose Status   acetaZOLAMIDE SR (DIAMOX) 500 MG CAPSULE SR 12 HR  Active   albuterol 108 (90 Base) MCG/ACT Aero Soln inhalation aerosol  Active   aspirin EC (ECOTRIN) 81 MG Tablet Delayed Response  Active   busPIRone (BUSPAR) 10 MG Tab tablet  Active   Coenzyme Q10 300 MG Cap  Active   dexamethasone pf (DECADRON) 10 MG/ML Solution  Active   Diclofenac Sodium 1 % Gel  Active   Dulaglutide (TRULICITY) 1.5 MG/0.5ML Solution Pen-injector  Active   etonogestrel (NEXPLANON) 68 MG Implant implant  Active   fluoxetine (PROZAC) 40 MG capsule  Active   gabapentin (NEURONTIN) 300 MG Cap  Active   insulin glargine (LANTUS) 100 UNIT/ML Solution  Active   ivabradine (CORLANOR) 7.5 MG Tab tablet  Active   levothyroxine (SYNTHROID) 75 MCG Tab  Active   lidocaine (XYLOCAINE) 2 % Solution  Active   magnesium oxide (MAG-OX) 400 MG Tab tablet  Active   Melatonin 10 MG Tab  Active   methocarbamol (ROBAXIN) 750 MG Tab  Active   Mirabegron ER (MYRBETRIQ) 50 MG TABLET SR 24 HR  Active   Morphine Sulfate (MORPHINE, PF,) 4 MG/ML Solution  Active   mycophenolate (CELLCEPT) 500 MG tablet  Active   nitrofurantoin (MACROBID) 100 MG Cap  Active   nitrofurantoin macro crystals (MACRODANTIN) 100 MG Cap  Active   nystatin (MYCOSTATIN) 606073 UNIT/GM Cream topical cream  Active   omeprazole (PRILOSEC) 20 MG delayed-release capsule  Active   ondansetron (ZOFRAN ODT) 4 MG TABLET DISPERSIBLE  Active   ondansetron (ZOFRAN) 8 MG Tab  Active   OXcarbazepine (TRILEPTAL) 150 MG Tab  Active   oxybutynin (DITROPAN) 5 MG Tab  Active   oxyCODONE immediate release (ROXICODONE) 10 MG immediate release tablet  Active   potassium chloride SA (KDUR) 20 MEQ Tab CR  Active   predniSONE (DELTASONE) 10 MG Tab  Active   pregabalin (LYRICA) 300 MG capsule   "Active   Riboflavin 400 MG Tab  Active   sodium bicarbonate (SODIUM BICARBONATE) 650 MG Tab  Active   topiramate (TOPAMAX) 50 MG tablet  Active   traZODone (DESYREL) 100 MG Tab  Active   vitamin D (CHOLECALCIFEROL) 1000 Unit (25 mcg) Tab  Active   vitamin D, Ergocalciferol, (DRISDOL) 1.25 MG (66523 UT) Cap capsule  Active   ziprasidone (GEODON) 40 MG Cap  Active                ALLERGIES  Allergies   Allergen Reactions   • Depakote [Divalproex Sodium] Unspecified     Muscle spasms/muscle pain and weakness     • Amitriptyline Unspecified     Headaches     • Aripiprazole [Abilify] Unspecified     Headaches/muscle twitching     • Clindamycin Nausea     Even with food     • Flagyl [Metronidazole Hcl] Unspecified     \"eye problems\"     • Flomax [Tamsulosin Hydrochloride] Swelling   • Levaquin Unspecified     Severe muscle cramps in legs  RXN=unknown   • Metformin Unspecified     Increased lactic acid      • Tape Rash     Tears skin off  coban with Tegaderm tape ok intermittently  RXN=ongoing   • Vancomycin Itching     Pt becomes flushed in face and chest.   RXN=7/10/16   • Wound Dressing Adhesive Hives     By pt report   • Ciprofloxacin    • Hydromorphone Hcl      Pt states she feels loopy   • Keflex Rash     Pt states she gets a rash with this medication  Tolerates ceftriaxone   • Levofloxacin Unspecified     Leg muscle cramps   • Metronidazole Rash     .   • Penicillins    • Tamsulosin    • Valproic Acid Rash     .       PHYSICAL EXAM  VITAL SIGNS: /85   Pulse 97   Temp 36.4 °C (97.5 °F) (Temporal)   Resp 20   Ht 1.651 m (5' 5\")   Wt 115 kg (253 lb 8.5 oz)   SpO2 100%   BMI 42.19 kg/m²       Constitutional: Poor historian, developmental delay, obese, appears chronically ill. No acute distress, Non-toxic appearance.   HENT: Normocephalic, Atraumatic, Bilateral external ears normal, Oropharynx with patchy erythema and white plaques. Nose normal.   Eyes: PERRLA, EOMI, Conjunctiva normal, No discharge.   Neck: " Normal range of motion, No tenderness, Supple, No stridor.   Lymphatic: No lymphadenopathy noted.   Cardiovascular: Distant heart sounds, Normal heart rate, Normal rhythm, No murmurs, No rubs, No gallops.   Thorax & Lungs: Normal breath sounds, No respiratory distress, No wheezing, No chest tenderness.   Abdomen: Central obesity, Bowel sounds normal, Soft, non-distended. TTP in suprapubic area, suprapubic catheter in place with small area of surrounding erythema and purulent d/c.   Skin: Warm, Dry, multiple ecchymoses over abdomen (from SC lovenox during recent hospitalization per patient).   Back: R CVA tenderness.   Genitalia: External genitalia appear normal, No masses or lesions. No discharge.   Rectal: Deferred  Extremities: Intact distal pulses, No edema, No cyanosis, No clubbing.   Musculoskeletal: Uses walker for ambulation, chronic debility. No major deformities noted.   Neurologic: Alert & oriented x 3, Normal motor function, Normal sensory function, No focal deficits noted.   Psychiatric: Anxious affect, congruent with mood    EKG  N/A    RADIOLOGY/PROCEDURES  Physician procedure : Change of suprapubic cathter due to infection and blockage of catheter replaced at bedside following administration of midazolam for anxiety. Minimal urine return. Attempted to visualize with POCUS but with poor visualization. Replaced with catheter one size smaller with minimal return of urine.  Patient return of urine on the third attempt no complications    Physician procedure: Peripheral IV ultrasound guidance.  Indication nursing staff unable to place appropriate functionality.  Verbal consent was obtained.  The left forearm was prepped with chlorhexidine x3.  Dynamic real-time vascular ultrasound was utilized to visualize forearm vein.  The vessel was cannulated with a standard 20-gauge Angiocath dark nonpulsatile blood was aspirated    COURSE & MEDICAL DECISION MAKING  Pertinent Labs & Imaging studies reviewed. (See  chart for details)  28 y/o female with complicated PMH presenting for symptoms concerning for complicated UTI in setting of suprapubic catheter. Catheter replaced as above. Afebrile with stable VS. Labs with mild hypokalemia (K 3.3), acidosis (CO2 13), and slightly elevated AG (17). CBC, HCG, Procal, and remainder of CMP unremarkable. Urine and blood cultures collected. Treated with NS bolus.  Consultation with infectious disease pharmacist recommended IV meropenem and admission given her complicated antibiotic as well as ESBL culture history    FINAL IMPRESSION  1. Complicated UTI in presence of suprapubic catheter  2. Oropharyngeal candidiasis  3. Debility     Addendum: I examined the patient and performed all of the above-mentioned procedures exclusively myself.  I consulted with the infectious disease pharmacist who recommended IV meropenem.  The patient will be admitted to internal medicine    Electronically signed by: Brian Castrejon M.D., 9/21/2020 2:43 PM

## 2020-09-21 NOTE — ED NOTES
Med rec complete per grandmother over the phone  Allergies reviewed    Patient unable to tell me when she took her medications last, grandmother stated she took her medications this AM    Grandmother can bring in [facility] in the AM of 9- if she get admitted     Unknown day of week for Vitamin D    Prior to Macrobid she had completed a course of Bactrim

## 2020-09-21 NOTE — ED TRIAGE NOTES
Chief Complaint   Patient presents with   • Pain     at site of suprapubic catheter   • Other     foul odor at suprapubic site with discharge     Triage process explained to patient.  Pt instructed to inform RN if any changes or questions arise.  Pt back to waiting room.

## 2020-09-21 NOTE — ED NOTES
Pt was incontinent of stool in brief.  Pt reports unable to clean self or go to the BR.  Pt cleaned thoroughly and provided full linen change, new brief and warm blankets.

## 2020-09-22 LAB
ALBUMIN SERPL BCP-MCNC: 3.8 G/DL (ref 3.2–4.9)
ALBUMIN/GLOB SERPL: 2.2 G/DL
ALP SERPL-CCNC: 68 U/L (ref 30–99)
ALT SERPL-CCNC: 17 U/L (ref 2–50)
ANION GAP SERPL CALC-SCNC: 12 MMOL/L (ref 7–16)
AST SERPL-CCNC: 6 U/L (ref 12–45)
BASOPHILS # BLD AUTO: 0.4 % (ref 0–1.8)
BASOPHILS # BLD: 0.03 K/UL (ref 0–0.12)
BILIRUB SERPL-MCNC: 0.3 MG/DL (ref 0.1–1.5)
BUN SERPL-MCNC: 11 MG/DL (ref 8–22)
CALCIUM SERPL-MCNC: 8.5 MG/DL (ref 8.5–10.5)
CHLORIDE SERPL-SCNC: 113 MMOL/L (ref 96–112)
CO2 SERPL-SCNC: 15 MMOL/L (ref 20–33)
CREAT SERPL-MCNC: 0.65 MG/DL (ref 0.5–1.4)
EOSINOPHIL # BLD AUTO: 0.27 K/UL (ref 0–0.51)
EOSINOPHIL NFR BLD: 3.9 % (ref 0–6.9)
ERYTHROCYTE [DISTWIDTH] IN BLOOD BY AUTOMATED COUNT: 46.3 FL (ref 35.9–50)
GLOBULIN SER CALC-MCNC: 1.7 G/DL (ref 1.9–3.5)
GLUCOSE BLD-MCNC: 123 MG/DL (ref 65–99)
GLUCOSE BLD-MCNC: 93 MG/DL (ref 65–99)
GLUCOSE BLD-MCNC: 93 MG/DL (ref 65–99)
GLUCOSE BLD-MCNC: 97 MG/DL (ref 65–99)
GLUCOSE SERPL-MCNC: 97 MG/DL (ref 65–99)
HCT VFR BLD AUTO: 38.8 % (ref 37–47)
HGB BLD-MCNC: 12.4 G/DL (ref 12–16)
IMM GRANULOCYTES # BLD AUTO: 0.04 K/UL (ref 0–0.11)
IMM GRANULOCYTES NFR BLD AUTO: 0.6 % (ref 0–0.9)
LYMPHOCYTES # BLD AUTO: 1.29 K/UL (ref 1–4.8)
LYMPHOCYTES NFR BLD: 18.8 % (ref 22–41)
MAGNESIUM SERPL-MCNC: 1.8 MG/DL (ref 1.5–2.5)
MCH RBC QN AUTO: 28.9 PG (ref 27–33)
MCHC RBC AUTO-ENTMCNC: 32 G/DL (ref 33.6–35)
MCV RBC AUTO: 90.4 FL (ref 81.4–97.8)
MONOCYTES # BLD AUTO: 0.56 K/UL (ref 0–0.85)
MONOCYTES NFR BLD AUTO: 8.2 % (ref 0–13.4)
NEUTROPHILS # BLD AUTO: 4.68 K/UL (ref 2–7.15)
NEUTROPHILS NFR BLD: 68.1 % (ref 44–72)
NRBC # BLD AUTO: 0 K/UL
NRBC BLD-RTO: 0 /100 WBC
PLATELET # BLD AUTO: 146 K/UL (ref 164–446)
PMV BLD AUTO: 12.4 FL (ref 9–12.9)
POTASSIUM SERPL-SCNC: 2.8 MMOL/L (ref 3.6–5.5)
PROT SERPL-MCNC: 5.5 G/DL (ref 6–8.2)
RBC # BLD AUTO: 4.29 M/UL (ref 4.2–5.4)
SODIUM SERPL-SCNC: 140 MMOL/L (ref 135–145)
WBC # BLD AUTO: 6.9 K/UL (ref 4.8–10.8)

## 2020-09-22 PROCEDURE — 96372 THER/PROPH/DIAG INJ SC/IM: CPT

## 2020-09-22 PROCEDURE — 85025 COMPLETE CBC W/AUTO DIFF WBC: CPT

## 2020-09-22 PROCEDURE — 700111 HCHG RX REV CODE 636 W/ 250 OVERRIDE (IP): Performed by: NURSE PRACTITIONER

## 2020-09-22 PROCEDURE — 83735 ASSAY OF MAGNESIUM: CPT

## 2020-09-22 PROCEDURE — 700102 HCHG RX REV CODE 250 W/ 637 OVERRIDE(OP): Performed by: INTERNAL MEDICINE

## 2020-09-22 PROCEDURE — A9270 NON-COVERED ITEM OR SERVICE: HCPCS | Performed by: INTERNAL MEDICINE

## 2020-09-22 PROCEDURE — 700111 HCHG RX REV CODE 636 W/ 250 OVERRIDE (IP): Performed by: INTERNAL MEDICINE

## 2020-09-22 PROCEDURE — 96367 TX/PROPH/DG ADDL SEQ IV INF: CPT

## 2020-09-22 PROCEDURE — 36415 COLL VENOUS BLD VENIPUNCTURE: CPT

## 2020-09-22 PROCEDURE — G0378 HOSPITAL OBSERVATION PER HR: HCPCS

## 2020-09-22 PROCEDURE — 99226 PR SUBSEQUENT OBSERVATION CARE,LEVEL III: CPT | Performed by: INTERNAL MEDICINE

## 2020-09-22 PROCEDURE — 82962 GLUCOSE BLOOD TEST: CPT | Mod: 91

## 2020-09-22 PROCEDURE — 96366 THER/PROPH/DIAG IV INF ADDON: CPT

## 2020-09-22 PROCEDURE — 700105 HCHG RX REV CODE 258: Performed by: INTERNAL MEDICINE

## 2020-09-22 PROCEDURE — 80053 COMPREHEN METABOLIC PANEL: CPT

## 2020-09-22 RX ORDER — POTASSIUM CHLORIDE 20 MEQ/1
40 TABLET, EXTENDED RELEASE ORAL 2 TIMES DAILY
Status: DISCONTINUED | OUTPATIENT
Start: 2020-09-22 | End: 2020-09-22

## 2020-09-22 RX ORDER — POTASSIUM CHLORIDE 7.45 MG/ML
10 INJECTION INTRAVENOUS
Status: DISPENSED | OUTPATIENT
Start: 2020-09-22 | End: 2020-09-22

## 2020-09-22 RX ORDER — POTASSIUM CHLORIDE 20 MEQ/1
40 TABLET, EXTENDED RELEASE ORAL DAILY
Status: DISCONTINUED | OUTPATIENT
Start: 2020-09-22 | End: 2020-09-24

## 2020-09-22 RX ADMIN — MEROPENEM 500 MG: 500 INJECTION, POWDER, FOR SOLUTION INTRAVENOUS at 11:59

## 2020-09-22 RX ADMIN — GABAPENTIN 300 MG: 300 CAPSULE ORAL at 17:48

## 2020-09-22 RX ADMIN — OXYCODONE HYDROCHLORIDE 10 MG: 10 TABLET ORAL at 15:40

## 2020-09-22 RX ADMIN — ENOXAPARIN SODIUM 40 MG: 40 INJECTION SUBCUTANEOUS at 05:23

## 2020-09-22 RX ADMIN — OXYBUTYNIN CHLORIDE 5 MG: 5 TABLET ORAL at 17:43

## 2020-09-22 RX ADMIN — METHOCARBAMOL 750 MG: 750 TABLET ORAL at 09:08

## 2020-09-22 RX ADMIN — OXCARBAZEPINE 150 MG: 150 TABLET, FILM COATED ORAL at 17:43

## 2020-09-22 RX ADMIN — POTASSIUM CHLORIDE 10 MEQ: 7.46 INJECTION, SOLUTION INTRAVENOUS at 08:07

## 2020-09-22 RX ADMIN — POTASSIUM CHLORIDE 40 MEQ: 1500 TABLET, EXTENDED RELEASE ORAL at 05:21

## 2020-09-22 RX ADMIN — TOPIRAMATE 50 MG: 25 TABLET, FILM COATED ORAL at 17:42

## 2020-09-22 RX ADMIN — BUSPIRONE HYDROCHLORIDE 10 MG: 10 TABLET ORAL at 17:44

## 2020-09-22 RX ADMIN — ZIPRASIDONE HYDROCHLORIDE 40 MG: 40 CAPSULE ORAL at 17:44

## 2020-09-22 RX ADMIN — SODIUM BICARBONATE 650 MG: 650 TABLET ORAL at 12:43

## 2020-09-22 RX ADMIN — TOPIRAMATE 50 MG: 25 TABLET, FILM COATED ORAL at 05:21

## 2020-09-22 RX ADMIN — PREGABALIN 300 MG: 150 CAPSULE ORAL at 17:55

## 2020-09-22 RX ADMIN — MEROPENEM 500 MG: 500 INJECTION, POWDER, FOR SOLUTION INTRAVENOUS at 17:48

## 2020-09-22 RX ADMIN — ZIPRASIDONE HYDROCHLORIDE 40 MG: 40 CAPSULE ORAL at 05:22

## 2020-09-22 RX ADMIN — GABAPENTIN 300 MG: 300 CAPSULE ORAL at 11:58

## 2020-09-22 RX ADMIN — ACETAZOLAMIDE 1000 MG: 500 CAPSULE, EXTENDED RELEASE ORAL at 17:42

## 2020-09-22 RX ADMIN — OXYBUTYNIN CHLORIDE 5 MG: 5 TABLET ORAL at 11:58

## 2020-09-22 RX ADMIN — FLUOXETINE 40 MG: 20 CAPSULE ORAL at 05:22

## 2020-09-22 RX ADMIN — OXYCODONE HYDROCHLORIDE 10 MG: 10 TABLET ORAL at 01:17

## 2020-09-22 RX ADMIN — MYCOPHENOLATE MOFETIL 1000 MG: 250 CAPSULE ORAL at 06:43

## 2020-09-22 RX ADMIN — ASPIRIN 81 MG: 81 TABLET, COATED ORAL at 05:22

## 2020-09-22 RX ADMIN — METHOCARBAMOL 750 MG: 750 TABLET ORAL at 20:48

## 2020-09-22 RX ADMIN — SODIUM BICARBONATE 650 MG: 650 TABLET ORAL at 17:44

## 2020-09-22 RX ADMIN — ACETAZOLAMIDE 1500 MG: 500 CAPSULE, EXTENDED RELEASE ORAL at 06:43

## 2020-09-22 RX ADMIN — OXYBUTYNIN CHLORIDE 5 MG: 5 TABLET ORAL at 06:45

## 2020-09-22 RX ADMIN — OMEPRAZOLE 20 MG: 20 CAPSULE, DELAYED RELEASE ORAL at 05:22

## 2020-09-22 RX ADMIN — MEROPENEM 500 MG: 500 INJECTION, POWDER, FOR SOLUTION INTRAVENOUS at 01:18

## 2020-09-22 RX ADMIN — METHOCARBAMOL 750 MG: 750 TABLET ORAL at 12:43

## 2020-09-22 RX ADMIN — MEROPENEM 500 MG: 500 INJECTION, POWDER, FOR SOLUTION INTRAVENOUS at 05:20

## 2020-09-22 RX ADMIN — POTASSIUM CHLORIDE 40 MEQ: 1500 TABLET, EXTENDED RELEASE ORAL at 17:48

## 2020-09-22 RX ADMIN — SODIUM BICARBONATE 650 MG: 650 TABLET ORAL at 20:47

## 2020-09-22 RX ADMIN — METHOCARBAMOL 750 MG: 750 TABLET ORAL at 17:44

## 2020-09-22 RX ADMIN — GABAPENTIN 300 MG: 300 CAPSULE ORAL at 05:22

## 2020-09-22 RX ADMIN — SODIUM CHLORIDE 1000 ML: 9 INJECTION, SOLUTION INTRAVENOUS at 01:08

## 2020-09-22 RX ADMIN — PREDNISONE 10 MG: 10 TABLET ORAL at 05:22

## 2020-09-22 RX ADMIN — PREGABALIN 300 MG: 150 CAPSULE ORAL at 06:41

## 2020-09-22 RX ADMIN — TRAZODONE HYDROCHLORIDE 100 MG: 100 TABLET ORAL at 20:47

## 2020-09-22 RX ADMIN — POTASSIUM CHLORIDE 10 MEQ: 7.46 INJECTION, SOLUTION INTRAVENOUS at 09:29

## 2020-09-22 RX ADMIN — SODIUM BICARBONATE 650 MG: 650 TABLET ORAL at 09:08

## 2020-09-22 RX ADMIN — LEVOTHYROXINE SODIUM 75 MCG: 0.07 TABLET ORAL at 05:21

## 2020-09-22 RX ADMIN — BUSPIRONE HYDROCHLORIDE 10 MG: 10 TABLET ORAL at 05:21

## 2020-09-22 RX ADMIN — POTASSIUM CHLORIDE 10 MEQ: 7.46 INJECTION, SOLUTION INTRAVENOUS at 06:40

## 2020-09-22 RX ADMIN — OXCARBAZEPINE 150 MG: 150 TABLET, FILM COATED ORAL at 06:44

## 2020-09-22 RX ADMIN — MYCOPHENOLATE MOFETIL 1000 MG: 250 CAPSULE ORAL at 17:44

## 2020-09-22 RX ADMIN — TRAZODONE HYDROCHLORIDE 100 MG: 100 TABLET ORAL at 01:16

## 2020-09-22 ASSESSMENT — ENCOUNTER SYMPTOMS
SHORTNESS OF BREATH: 0
FEVER: 0
NAUSEA: 0
CHILLS: 0
SEIZURES: 0
LOSS OF CONSCIOUSNESS: 0
DIARRHEA: 1
BLURRED VISION: 0
VOMITING: 0
FALLS: 0

## 2020-09-22 ASSESSMENT — LIFESTYLE VARIABLES
HOW MANY TIMES IN THE PAST YEAR HAVE YOU HAD 5 OR MORE DRINKS IN A DAY: 0
EVER FELT BAD OR GUILTY ABOUT YOUR DRINKING: NO
CONSUMPTION TOTAL: NEGATIVE
TOTAL SCORE: 0
TOTAL SCORE: 0
ALCOHOL_USE: NO
HAVE PEOPLE ANNOYED YOU BY CRITICIZING YOUR DRINKING: NO
ON A TYPICAL DAY WHEN YOU DRINK ALCOHOL HOW MANY DRINKS DO YOU HAVE: 0
AVERAGE NUMBER OF DAYS PER WEEK YOU HAVE A DRINK CONTAINING ALCOHOL: 0
EVER HAD A DRINK FIRST THING IN THE MORNING TO STEADY YOUR NERVES TO GET RID OF A HANGOVER: NO
TOTAL SCORE: 0
DOES PATIENT WANT TO STOP DRINKING: NO
HAVE YOU EVER FELT YOU SHOULD CUT DOWN ON YOUR DRINKING: NO

## 2020-09-22 ASSESSMENT — COGNITIVE AND FUNCTIONAL STATUS - GENERAL
DRESSING REGULAR LOWER BODY CLOTHING: A LOT
WALKING IN HOSPITAL ROOM: A LITTLE
STANDING UP FROM CHAIR USING ARMS: A LITTLE
TURNING FROM BACK TO SIDE WHILE IN FLAT BAD: A LITTLE
SUGGESTED CMS G CODE MODIFIER DAILY ACTIVITY: CL
SUGGESTED CMS G CODE MODIFIER MOBILITY: CK
PERSONAL GROOMING: A LOT
DRESSING REGULAR UPPER BODY CLOTHING: A LOT
MOVING FROM LYING ON BACK TO SITTING ON SIDE OF FLAT BED: A LITTLE
EATING MEALS: A LITTLE
MOVING TO AND FROM BED TO CHAIR: A LITTLE
MOBILITY SCORE: 18
HELP NEEDED FOR BATHING: A LOT
DAILY ACTIVITIY SCORE: 13
CLIMB 3 TO 5 STEPS WITH RAILING: A LITTLE
TOILETING: A LOT

## 2020-09-22 ASSESSMENT — PAIN DESCRIPTION - PAIN TYPE: TYPE: ACUTE PAIN

## 2020-09-22 NOTE — PROGRESS NOTES
[facility] Medicine Daily Progress Note    Date of Service  9/22/2020    Chief Complaint  29 y.o. female admitted 9/21/2020 with suprapubic catheter pain and odor at catheter site.    Hospital Course    30 yo F with h/o schizophrenia, borderline personality disorder, type 2 diabetes, morbid obesity, IAST, optic neuritis on cellcept, transverse mellitus, idiopathic intracranial hypertension and urinary retention status post suprapubic catheter placement admitted with pain at suprapubic catheter site and foul odor.  UA concerning for infection.  She was started on meropenem.      Interval Problem Update  - Admitted yesterday  - Afebrile  - Hypokalemia (2.8), bicarb 15  - Reports bladder pain has improved  - urine cx pending, bcx NGTD    Consultants/Specialty  None      Code Status  DNAR/DNI    Disposition  TBD likely home with grandmother    Review of Systems  Review of Systems   Constitutional: Negative for chills and fever.   HENT: Negative for nosebleeds.    Eyes: Negative for blurred vision.   Respiratory: Negative for shortness of breath.    Cardiovascular: Negative for chest pain and leg swelling.   Gastrointestinal: Positive for diarrhea. Negative for nausea and vomiting.   Genitourinary:        Suprapubic catheter with some bladder pain but improving   Musculoskeletal: Negative for falls.   Skin: Negative for rash.   Neurological: Negative for seizures and loss of consciousness.        Physical Exam  Temp:  [36.1 °C (97 °F)-37.1 °C (98.8 °F)] 36.2 °C (97.1 °F)  Pulse:  [63-88] 71  Resp:  [16-20] 16  BP: (102-140)/(58-84) 113/63  SpO2:  [90 %-100 %] 90 %    Physical Exam  Vitals signs and nursing note reviewed.   Constitutional:       General: She is not in acute distress.     Appearance: She is obese. She is not toxic-appearing or diaphoretic.   HENT:      Head: Normocephalic and atraumatic.      Nose: Nose normal.      Mouth/Throat:      Mouth: Mucous membranes are moist.   Eyes:      General: No scleral  icterus.        Right eye: No discharge.         Left eye: No discharge.      Conjunctiva/sclera: Conjunctivae normal.   Neck:      Musculoskeletal: Normal range of motion.   Cardiovascular:      Rate and Rhythm: Normal rate and regular rhythm.      Pulses: Normal pulses.      Heart sounds: No murmur. No friction rub. No gallop.    Pulmonary:      Effort: Pulmonary effort is normal.      Breath sounds: Normal breath sounds.   Abdominal:      General: Abdomen is flat. Bowel sounds are normal. There is no distension.      Palpations: Abdomen is soft.      Tenderness: There is no abdominal tenderness.   Genitourinary:     Comments: Suprapubic catheter with some purulent drainage around catheter  Musculoskeletal:      Right lower leg: No edema.      Left lower leg: No edema.   Skin:     General: Skin is warm and dry.   Neurological:      Mental Status: She is alert and oriented to person, place, and time.   Psychiatric:         Mood and Affect: Mood normal.         Behavior: Behavior normal.         Fluids    Intake/Output Summary (Last 24 hours) at 9/22/2020 1353  Last data filed at 9/22/2020 1221  Gross per 24 hour   Intake 100 ml   Output 250 ml   Net -150 ml       Laboratory  Recent Labs     09/21/20  1415 09/22/20  0346   WBC 7.1 6.9   RBC 4.80 4.29   HEMOGLOBIN 13.9 12.4   HEMATOCRIT 43.7 38.8   MCV 91.0 90.4   MCH 29.0 28.9   MCHC 31.8* 32.0*   RDW 47.0 46.3   PLATELETCT 199 146*   MPV 12.1 12.4     Recent Labs     09/21/20  1415 09/22/20  0346   SODIUM 141 140   POTASSIUM 3.3* 2.8*   CHLORIDE 111 113*   CO2 13* 15*   GLUCOSE 114* 97   BUN 11 11   CREATININE 0.80 0.65   CALCIUM 9.6 8.5                   Imaging  No orders to display        Assessment/Plan  * Urinary tract infection  Assessment & Plan  Admitted with bladder pain and purulent drainage around cathter, afebrile, normal WBC, likely in colonized at this point   History of ESBL  Meropenem 9/21-**  Blood cx NGTD  Ucx pending  She has been taking  antibiotic as outpatient I discontinued those antibiotics and I ordered C. difficile testing as she has been complaining of diarrhea.  I reviewed her prior C. difficile testing that was negative.  Started her on pain medication monitor for adverse effect of pain medications.    Intracranial hypertension- (present on admission)  Assessment & Plan  Continue diamox, likely cause of metabolic acidosis  On sodium bicarb supplements    Optic neuritis- (present on admission)  Assessment & Plan  Continue cellcept and prednisone    Hypokalemia  Assessment & Plan  Replace as needed.  Continue to monitor.    Seizure (HCC)- (present on admission)  Assessment & Plan  Continue outpatient medications  Seizure precautions.    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HCC)- (present on admission)  Assessment & Plan  She has a history of.  Continue outpatient medications.    Type 2 diabetes mellitus without complication, with long-term current use of insulin (HCC)- (present on admission)  Assessment & Plan  Started her on insulin sliding scale with hypoglycemia protocol.  Continue to monitor.    Psychiatric disorder- (present on admission)  Assessment & Plan  Continue home psych meds    Depression- (present on admission)  Assessment & Plan  Continue outpatient medications.       VTE prophylaxis: Lovenox

## 2020-09-22 NOTE — H&P
Hospital Medicine History & Physical Note    Date of Service  9/21/2020    Primary Care Physician  Torres Brody M.D.    Consultants  None    Code Status  DNAR/DNI    I reviewed POLST and it showed DNR.    Chief Complaint  Chief Complaint   Patient presents with   • Pain     at site of suprapubic catheter   • Other     foul odor at suprapubic site with discharge       History of Presenting Illness    29 y.o. female reportedly history of transverse mellitus and diabetes who presented to the hospital on 9/21/2020 with complaint of severe suprapubic pain.  She was discharged from the hospital on September 14, 2020 and she reported after discharge she has been having severe pain at the site of suprapubic catheter.  There is no specific radiation of her pain.  There is no specific aggravating or alleviating factors.  Her symptoms of pain is associated with nausea and diarrhea.  She reported she has been having diarrhea and she has been taking antibiotic.  She denies any other acute complaints or associated symptoms.    ER course: Patient has history of ESBL and she was started on meropenem.  I continue meropenem for possible ESBL.  I also ordered C. difficile as she has risk factor for C. difficile.  Her last C. difficile was negative on September 1, 2020.    Review of Systems  Review of Systems   Constitutional: Negative for chills, fever and weight loss.   HENT: Negative for hearing loss and tinnitus.    Eyes: Negative for blurred vision, double vision, photophobia and pain.   Respiratory: Negative for cough, sputum production and shortness of breath.    Cardiovascular: Negative for chest pain, palpitations, orthopnea and leg swelling.   Gastrointestinal: Positive for diarrhea. Negative for abdominal pain, constipation, nausea and vomiting.   Genitourinary: Negative for dysuria, frequency and urgency.        Suprapubic pain at the site of suprapubic catheter   Musculoskeletal: Negative for back pain, joint pain,  myalgias and neck pain.   Skin: Negative for rash.   Neurological: Negative for dizziness, tingling, tremors, sensory change, speech change, focal weakness and headaches.   Psychiatric/Behavioral: Negative for hallucinations and substance abuse.   All other systems reviewed and are negative.      Past Medical History   has a past medical history of Abdominal pain, Anginal syndrome, Apnea, sleep, Arrhythmia, Arthritis, ASTHMA, Atrial fibrillation ([facility]), Back pain, Borderline personality disorder ([facility]), Breath shortness, Bronchitis, Cardiac arrhythmia, Chickenpox, Chronic UTI (9/18/2010), Cough, Daytime sleepiness, Depression, Diabetes ([facility]), Diarrhea, Disorder of thyroid, Fall, Fatigue, Frequent headaches, Gasping for breath, Gynecological disorder, Headache(784.0), Heart burn, History of falling, Hypertension, Indigestion, Migraine, 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[facility]), 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    Surgical History   has a past surgical history that includes neuro dest facet l/s w/ig sngl (4/21/2015); recovery (1/27/2016); delmar by laparoscopy (8/29/2010); lumbar fusion anterior (8/21/2012); other cardiac surgery (1/2016); tonsillectomy; bowel stimulator insertion (7/13/2016); gastroscopy with balloon dilatation (N/A, 1/4/2017); muscle biopsy (Right, 1/26/2017); other abdominal surgery; and laminotomy.     Family History  family history includes Genitourinary () Problems in her sister; Heart Disease in her maternal grandmother and mother; Hypertension in her maternal grandmother, maternal uncle, and mother; No Known  "Problems in her sister; Other in her mother and sister; Sleep Apnea in her mother.     Social History   reports that she has never smoked. She has never used smokeless tobacco. She reports previous drug use. Frequency: 7.00 times per week. Drug: Marijuana. She reports that she does not drink alcohol.    Allergies  Allergies   Allergen Reactions   • Depakote [Divalproex Sodium] Unspecified     Muscle spasms/muscle pain and weakness     • Amitriptyline Unspecified     Headaches     • Aripiprazole [Abilify] Unspecified     Headaches/muscle twitching     • Clindamycin Nausea     Even with food     • Flagyl [Metronidazole Hcl] Unspecified     \"eye problems\"     • Flomax [Tamsulosin Hydrochloride] Swelling   • Levaquin Unspecified     Severe muscle cramps in legs  RXN=unknown   • Metformin Unspecified     Increased lactic acid      • Tape Rash     Tears skin off  coban with Tegaderm tape ok intermittently  RXN=ongoing   • Vancomycin Itching     Pt becomes flushed in face and chest.   RXN=7/10/16   • Wound Dressing Adhesive Hives     By pt report   • Ciprofloxacin    • Hydromorphone Hcl      Pt states she feels loopy   • Keflex Rash     Pt states she gets a rash with this medication  Tolerates ceftriaxone   • Levofloxacin Unspecified     Leg muscle cramps   • Metronidazole Rash     .   • Penicillins    • Tamsulosin    • Valproic Acid Rash     .       Medications  Prior to Admission Medications   Prescriptions Last Dose Informant Patient Reported? Taking?   Coenzyme Q10 300 MG Cap 9/21/2020 at AM Family Member Yes No   Sig: Take 300 mg by mouth every day.   Dulaglutide (TRULICITY) 1.5 MG/0.5ML Solution Pen-injector 9/18/2020 Family Member Yes No   Sig: Inject 0.5 mL as instructed every Friday.   Melatonin 10 MG Tab 9/20/2020 at HS Family Member Yes No   Sig: Take 10 mg by mouth every evening.   Mirabegron ER (MYRBETRIQ) 50 MG TABLET SR 24 HR 9/21/2020 at AM Family Member Yes No   Sig: Take 50 mg by mouth every day. "   OXcarbazepine (TRILEPTAL) 150 MG Tab 9/21/2020 at AM Family Member Yes No   Sig: Take 150 mg by mouth 2 times a day.   Riboflavin 400 MG Tab 9/21/2020 at AM Family Member Yes No   Sig: Take 400 mg by mouth every day.   acetaZOLAMIDE SR (DIAMOX) 500 MG CAPSULE SR 12 HR 9/21/2020 at AM Family Member Yes No   Sig: Take 1,000-1,500 mg by mouth 2 times a day. 1500mg in AM  1000mg at PM   albuterol 108 (90 Base) MCG/ACT Aero Soln inhalation aerosol PRNQPRN Family Member Yes No   Sig: Inhale 2 Puffs by mouth every 6 hours as needed for Shortness of Breath.   aspirin EC (ECOTRIN) 81 MG Tablet Delayed Response 9/21/2020 at AM Family Member Yes No   Sig: Take 81 mg by mouth every day.   busPIRone (BUSPAR) 10 MG Tab tablet 9/21/2020 at AM Family Member Yes No   Sig: Take 10 mg by mouth 2 times a day.   etonogestrel (NEXPLANON) 68 MG Implant implant CONT Family Member Yes No   Sig: Inject 1 Each as instructed Once.   fluoxetine (PROZAC) 40 MG capsule 9/21/2020 at AM Family Member No No   Sig: Take 1 Cap by mouth every day.   gabapentin (NEURONTIN) 300 MG Cap 9/21/2020 at AM Family Member Yes No   Sig: Take 300 mg by mouth 3 times a day.   insulin glargine (LANTUS) 100 UNIT/ML Solution 9/20/2020 at PM Family Member No No   Sig: Inject 8 Units as instructed every evening.   ivabradine (CORLANOR) 7.5 MG Tab tablet 9/21/2020 at AM Family Member Yes No   Sig: Take 7.5 mg by mouth 2 times a day, with meals.   levothyroxine (SYNTHROID) 75 MCG Tab 9/21/2020 at AM Family Member No No   Sig: Take 1 Tab by mouth Every morning on an empty stomach.   lidocaine (XYLOCAINE) 2 % Solution UNK at UNK Family Member No No   Sig: Take 5 mL by mouth as needed for Throat/Mouth Pain (q6hr PRN throat pain, ok to rinse and spit or swallow).   magnesium oxide (MAG-OX) 400 MG Tab tablet 9/21/2020 at AM Family Member Yes No   Sig: Take 400 mg by mouth every day.   methocarbamol (ROBAXIN) 750 MG Tab 9/21/2020 at AM Family Member Yes No   Sig: Take 750 mg  by mouth 4 times a day.   mycophenolate (CELLCEPT) 500 MG tablet 9/21/2020 at AM Family Member Yes No   Sig: Take 1,000 mg by mouth 2 times a day.   nitrofurantoin (MACROBID) 100 MG Cap 9/21/2020 at AM Family Member No No   Sig: Take 1 Cap by mouth every 12 hours for 5 days.   nystatin (MYCOSTATIN) 208811 UNIT/GM Cream topical cream UNK at UNK Family Member Yes No   Sig: Apply 1 Application to affected area(s) 2 times a day as needed (To folds, prevention of fungal growth.).   omeprazole (PRILOSEC) 20 MG delayed-release capsule 9/21/2020 at AM Family Member No No   Sig: Take 1 Cap by mouth every day.   ondansetron (ZOFRAN ODT) 4 MG TABLET DISPERSIBLE UNK at UNK Family Member Yes No   Sig: Take 4 mg by mouth every 6 hours as needed for Nausea.   oxyCODONE immediate release (ROXICODONE) 10 MG immediate release tablet 9/21/2020 at AM Family Member No No   Sig: Take 1 Tab by mouth every four hours as needed for up to 15 days.   oxybutynin (DITROPAN) 5 MG Tab 9/21/2020 at AM Family Member No No   Sig: Take 1 Tab by mouth 3 times a day.   potassium chloride SA (KDUR) 20 MEQ Tab CR 9/21/2020 at AM Family Member Yes No   Sig: Take 20 mEq by mouth 2 times a day.   predniSONE (DELTASONE) 10 MG Tab 9/21/2020 at AM Family Member Yes No   Sig: Take 10 mg by mouth every day. Maintenance Medication.   pregabalin (LYRICA) 300 MG capsule 9/21/2020 at AM Family Member Yes No   Sig: Take 300 mg by mouth 2 times a day.   sodium bicarbonate (SODIUM BICARBONATE) 650 MG Tab 9/21/2020 at AM Family Member Yes No   Sig: Take 650 mg by mouth 4 times a day.   topiramate (TOPAMAX) 50 MG tablet 9/21/2020 at AM Family Member No No   Sig: Take 1 Tab by mouth 2 Times a Day.   traZODone (DESYREL) 100 MG Tab 9/20/2020 at HS Family Member No No   Sig: Take 1 Tab by mouth every bedtime.   vitamin D, Ergocalciferol, (DRISDOL) 1.25 MG (57950 UT) Cap capsule UNK at UNK Family Member Yes No   Sig: Take 50,000 Units by mouth every 7 days.   ziprasidone  (GEODON) 40 MG Cap 9/21/2020 at AM Family Member No No   Sig: Take 1 Cap by mouth 2 Times a Day.      Facility-Administered Medications: None       Physical Exam  Temp:  [36.4 °C (97.5 °F)] 36.4 °C (97.5 °F)  Pulse:  [66-97] 67  Resp:  [18-20] 19  BP: (120-140)/(62-85) 129/75  SpO2:  [98 %-100 %] 100 %    Physical Exam  Vitals signs reviewed.   Constitutional:       General: She is not in acute distress.     Appearance: Normal appearance. She is not ill-appearing.   HENT:      Head: Normocephalic and atraumatic.      Nose: No congestion.   Eyes:      General:         Right eye: No discharge.         Left eye: No discharge.      Pupils: Pupils are equal, round, and reactive to light.   Neck:      Musculoskeletal: Normal range of motion. No neck rigidity.   Cardiovascular:      Rate and Rhythm: Normal rate and regular rhythm.      Pulses: Normal pulses.      Heart sounds: Normal heart sounds. No murmur.   Pulmonary:      Effort: Pulmonary effort is normal. No respiratory distress.      Breath sounds: Normal breath sounds. No stridor.   Abdominal:      General: Bowel sounds are normal. There is no distension.      Palpations: Abdomen is soft.      Tenderness: There is abdominal tenderness (Suprapubic area).      Comments: Suprapubic catheter present.   Musculoskeletal: Normal range of motion.         General: No swelling or tenderness.   Skin:     General: Skin is warm.      Capillary Refill: Capillary refill takes less than 2 seconds.      Coloration: Skin is not jaundiced or pale.      Findings: No bruising.   Neurological:      General: No focal deficit present.      Mental Status: She is alert and oriented to person, place, and time.      Cranial Nerves: No cranial nerve deficit.   Psychiatric:         Mood and Affect: Mood normal.         Behavior: Behavior normal.     I performed physical exam in the presence of bedside RN while she was placing ultrasound-guided peripheral line.    Laboratory:  Recent Labs      09/21/20  1415   WBC 7.1   RBC 4.80   HEMOGLOBIN 13.9   HEMATOCRIT 43.7   MCV 91.0   MCH 29.0   MCHC 31.8*   RDW 47.0   PLATELETCT 199   MPV 12.1     Recent Labs     09/21/20  1415   SODIUM 141   POTASSIUM 3.3*   CHLORIDE 111   CO2 13*   GLUCOSE 114*   BUN 11   CREATININE 0.80   CALCIUM 9.6     Recent Labs     09/21/20  1415   ALTSGPT 21   ASTSGOT 10*   ALKPHOSPHAT 79   TBILIRUBIN 0.2   GLUCOSE 114*         No results for input(s): NTPROBNP in the last 72 hours.      No results for input(s): TROPONINT in the last 72 hours.    Imaging:  No orders to display         Assessment/Plan:  I anticipate this patient is appropriate for observation status at this time.    Urinary tract infection  Assessment & Plan  She found to have complicated urinary tract infection due to presence of suprapubic catheter.  I reviewed her prior urine culture and it showed ESBL.  She was started on meropenem in ER and I continue meropenem.  Blood cultures and urine cultures obtained in ER.  Admit to the hospital for close monitoring.  She has been taking antibiotic as outpatient I discontinued those antibiotics and I ordered C. difficile testing as she has been complaining of diarrhea.  I reviewed her prior C. difficile testing that was negative.  Started her on pain medication monitor for adverse effect of pain medications.    Hypokalemia  Assessment & Plan  Replace as needed.  Continue to monitor.    Seizure (HCC)- (present on admission)  Assessment & Plan  Continue outpatient medications  Seizure precautions.    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HCC)- (present on admission)  Assessment & Plan  She has a history of.  Continue outpatient medications.    Type 2 diabetes mellitus without complication, with long-term current use of insulin (HCC)- (present on admission)  Assessment & Plan  Started her on insulin sliding scale with hypoglycemia protocol.  Continue to monitor.    Depression- (present on admission)  Assessment & Plan  Continue outpatient  medications.    I reviewed discharge summary she was discharged on September 14, 2020

## 2020-09-22 NOTE — DISCHARGE PLANNING
Anticipated Discharge Disposition: Still pending medical team collaboration.     Action: Pt discussed during IDT rounds. No social work needs reported at this time. Discussion occurred surrounding importance of keeping pt ambulatory so she is able to keep her strength in order to d/c home once cleared.     Barriers to Discharge: Medical clearance.     Plan: LSW will continue to follow and assist in any d/c needs.

## 2020-09-22 NOTE — ED NOTES
"Pt up to the bedside commode, had one small loose seedy light brown stool.  Pt back to bed and monitoring equipment.  Pt feels L ac PIV placed by ERP is hurting and \"may be infiltrated\".  PIV appears patent, flushes but pt c/o pain.  IVF moved to R hand PIV.  "

## 2020-09-22 NOTE — PROGRESS NOTES
29-year-old female presenting with foul-smelling urine from the suprapubic catheter.  Known history of transverse myelitis, optic neuritis.  Recent admission for ESBL Klebsiella UTI and received a seven-day course of IV meropenem.  Discussed case with ED physician, suprapubic catheter has been replaced.    Admitting - Dr. Covarrubias

## 2020-09-22 NOTE — CARE PLAN
Problem: Pain Management  Goal: Pain level will decrease to patient's comfort goal  Outcome: PROGRESSING AS EXPECTED     Problem: Safety  Goal: Will remain free from injury  Outcome: PROGRESSING AS EXPECTED     Problem: Venous Thromboembolism (VTW)/Deep Vein Thrombosis (DVT) Prevention:  Goal: Patient will participate in Venous Thrombosis (VTE)/Deep Vein Thrombosis (DVT)Prevention Measures  Outcome: PROGRESSING AS EXPECTED

## 2020-09-22 NOTE — PROGRESS NOTES
2 RN Skin Check    2 RN skin check complete Gregory RN.   Devices in place: suprapubic catheter.  Skin assessed under devices: yes.  Confirmed pressure ulcers found on: N/A.  New potential pressure ulcers noted on N/A. Wound consult placed No.  The following interventions in place Pillows and Barrier cream.    Suprapubic catheter has purulent drainage.   Abdominal bruising present - patient states is from recent hospitalization lovenox injections.  Bruising to right shin from fall on prior admission  Scabs on left breast.  Scabs to inner labia  Behind left ear red/blanching.

## 2020-09-22 NOTE — PROGRESS NOTES
"Assume care of pt at 0700. Report received from NOC RN. Pt is A/O x4. Pain is 8/10, declines intervention. Pt is resting in bed. Bed in lowest and locked position, call light in reach, hourly rounding in place. Labs reviewed. Communication board updated. Will continue to monitor.     Pt restless in bed stating that the potassium is hurting through the IV. Rate slowed to 75 which she says iis more tolerable. Pt then states \"I dont think I need to be in the hospital, Dr. Purcell came in and said that I need to be at home to heal\". This RN reoriented the pt and told her that she is not being followed by a Dr. Purcell and that she needs to be in the hospital to get treatment for her UTI. Pt responded with \"ok\" then procceded to turn the other way and close her eyes. WCTM.   "

## 2020-09-22 NOTE — PROGRESS NOTES
Pt potassium low this AM at 2.8, MD to place pt on tele monitoring while potassium to be repleted. WCTM.

## 2020-09-22 NOTE — ASSESSMENT & PLAN NOTE
Admitted with bladder pain and purulent drainage around catheter, afebrile, normal WBC, likely is colonized at this point   History of ESBL  Meropenem 9/21-9/24  Fosfomycin x 1 9/24  Blood cx NGTD  Ucx klebsiella oxytoca ESBL  Was taking prophylactic abx outpatient, urology does not recommend doing this at discharge (spoke to Dr. Rosenbaum via tiger text on 9/24/20)  Started her on pain medication monitor for adverse effect of pain medications.

## 2020-09-22 NOTE — DIETARY
NUTRITION SERVICES: BMI - Pt with BMI >40 (=Body mass index is 42.19 kg/m².), morbid obesity. Weight loss counseling not appropriate in acute care setting. RECOMMEND - Referral to outpatient nutrition services for weight management after D/C.

## 2020-09-23 ENCOUNTER — APPOINTMENT (OUTPATIENT)
Dept: RADIOLOGY | Facility: MEDICAL CENTER | Age: 30
DRG: 699 | End: 2020-09-23
Attending: INTERNAL MEDICINE
Payer: MEDICARE

## 2020-09-23 LAB
ANION GAP SERPL CALC-SCNC: 10 MMOL/L (ref 7–16)
BUN SERPL-MCNC: 9 MG/DL (ref 8–22)
CALCIUM SERPL-MCNC: 8.7 MG/DL (ref 8.5–10.5)
CHLORIDE SERPL-SCNC: 116 MMOL/L (ref 96–112)
CO2 SERPL-SCNC: 15 MMOL/L (ref 20–33)
CREAT SERPL-MCNC: 0.62 MG/DL (ref 0.5–1.4)
GLUCOSE BLD-MCNC: 104 MG/DL (ref 65–99)
GLUCOSE BLD-MCNC: 117 MG/DL (ref 65–99)
GLUCOSE BLD-MCNC: 125 MG/DL (ref 65–99)
GLUCOSE BLD-MCNC: 132 MG/DL (ref 65–99)
GLUCOSE SERPL-MCNC: 97 MG/DL (ref 65–99)
PHOSPHATE SERPL-MCNC: 2.6 MG/DL (ref 2.5–4.5)
POTASSIUM SERPL-SCNC: 3.7 MMOL/L (ref 3.6–5.5)
SODIUM SERPL-SCNC: 141 MMOL/L (ref 135–145)

## 2020-09-23 PROCEDURE — A9270 NON-COVERED ITEM OR SERVICE: HCPCS | Performed by: INTERNAL MEDICINE

## 2020-09-23 PROCEDURE — 96372 THER/PROPH/DIAG INJ SC/IM: CPT

## 2020-09-23 PROCEDURE — 700111 HCHG RX REV CODE 636 W/ 250 OVERRIDE (IP): Performed by: INTERNAL MEDICINE

## 2020-09-23 PROCEDURE — 96366 THER/PROPH/DIAG IV INF ADDON: CPT

## 2020-09-23 PROCEDURE — 770004 HCHG ROOM/CARE - ONCOLOGY PRIVATE *

## 2020-09-23 PROCEDURE — 80048 BASIC METABOLIC PNL TOTAL CA: CPT

## 2020-09-23 PROCEDURE — 36415 COLL VENOUS BLD VENIPUNCTURE: CPT

## 2020-09-23 PROCEDURE — 84100 ASSAY OF PHOSPHORUS: CPT

## 2020-09-23 PROCEDURE — 700102 HCHG RX REV CODE 250 W/ 637 OVERRIDE(OP): Performed by: INTERNAL MEDICINE

## 2020-09-23 PROCEDURE — 99233 SBSQ HOSP IP/OBS HIGH 50: CPT | Performed by: INTERNAL MEDICINE

## 2020-09-23 PROCEDURE — 82962 GLUCOSE BLOOD TEST: CPT | Mod: 91

## 2020-09-23 PROCEDURE — 700105 HCHG RX REV CODE 258: Performed by: INTERNAL MEDICINE

## 2020-09-23 RX ADMIN — LEVOTHYROXINE SODIUM 75 MCG: 0.07 TABLET ORAL at 04:42

## 2020-09-23 RX ADMIN — MEROPENEM 500 MG: 500 INJECTION, POWDER, FOR SOLUTION INTRAVENOUS at 08:30

## 2020-09-23 RX ADMIN — MYCOPHENOLATE MOFETIL 1000 MG: 250 CAPSULE ORAL at 18:27

## 2020-09-23 RX ADMIN — MEROPENEM 500 MG: 500 INJECTION, POWDER, FOR SOLUTION INTRAVENOUS at 00:16

## 2020-09-23 RX ADMIN — METHOCARBAMOL 750 MG: 750 TABLET ORAL at 20:31

## 2020-09-23 RX ADMIN — TOPIRAMATE 50 MG: 25 TABLET, FILM COATED ORAL at 04:43

## 2020-09-23 RX ADMIN — METHOCARBAMOL 750 MG: 750 TABLET ORAL at 18:27

## 2020-09-23 RX ADMIN — MEROPENEM 500 MG: 500 INJECTION, POWDER, FOR SOLUTION INTRAVENOUS at 18:28

## 2020-09-23 RX ADMIN — SODIUM BICARBONATE 650 MG: 650 TABLET ORAL at 20:31

## 2020-09-23 RX ADMIN — OMEPRAZOLE 20 MG: 20 CAPSULE, DELAYED RELEASE ORAL at 04:42

## 2020-09-23 RX ADMIN — OXYCODONE HYDROCHLORIDE 10 MG: 10 TABLET ORAL at 04:42

## 2020-09-23 RX ADMIN — ENOXAPARIN SODIUM 40 MG: 40 INJECTION SUBCUTANEOUS at 04:43

## 2020-09-23 RX ADMIN — GABAPENTIN 300 MG: 300 CAPSULE ORAL at 18:27

## 2020-09-23 RX ADMIN — BUSPIRONE HYDROCHLORIDE 10 MG: 10 TABLET ORAL at 04:42

## 2020-09-23 RX ADMIN — TRAZODONE HYDROCHLORIDE 100 MG: 100 TABLET ORAL at 20:36

## 2020-09-23 RX ADMIN — ACETAZOLAMIDE 1000 MG: 500 CAPSULE, EXTENDED RELEASE ORAL at 18:27

## 2020-09-23 RX ADMIN — OXYCODONE HYDROCHLORIDE 10 MG: 10 TABLET ORAL at 18:33

## 2020-09-23 RX ADMIN — GABAPENTIN 300 MG: 300 CAPSULE ORAL at 12:18

## 2020-09-23 RX ADMIN — MYCOPHENOLATE MOFETIL 1000 MG: 250 CAPSULE ORAL at 04:42

## 2020-09-23 RX ADMIN — PREGABALIN 300 MG: 150 CAPSULE ORAL at 04:43

## 2020-09-23 RX ADMIN — OXYCODONE HYDROCHLORIDE 10 MG: 10 TABLET ORAL at 13:53

## 2020-09-23 RX ADMIN — FLUOXETINE 40 MG: 20 CAPSULE ORAL at 04:41

## 2020-09-23 RX ADMIN — ZIPRASIDONE HYDROCHLORIDE 40 MG: 40 CAPSULE ORAL at 18:27

## 2020-09-23 RX ADMIN — ACETAZOLAMIDE 1500 MG: 500 CAPSULE, EXTENDED RELEASE ORAL at 04:42

## 2020-09-23 RX ADMIN — PREDNISONE 10 MG: 10 TABLET ORAL at 04:42

## 2020-09-23 RX ADMIN — OXCARBAZEPINE 150 MG: 150 TABLET, FILM COATED ORAL at 18:27

## 2020-09-23 RX ADMIN — MEROPENEM 500 MG: 500 INJECTION, POWDER, FOR SOLUTION INTRAVENOUS at 12:16

## 2020-09-23 RX ADMIN — OXCARBAZEPINE 150 MG: 150 TABLET, FILM COATED ORAL at 04:41

## 2020-09-23 RX ADMIN — TOPIRAMATE 50 MG: 25 TABLET, FILM COATED ORAL at 18:27

## 2020-09-23 RX ADMIN — OXYBUTYNIN CHLORIDE 5 MG: 5 TABLET ORAL at 04:42

## 2020-09-23 RX ADMIN — OXYBUTYNIN CHLORIDE 5 MG: 5 TABLET ORAL at 18:26

## 2020-09-23 RX ADMIN — GABAPENTIN 300 MG: 300 CAPSULE ORAL at 04:42

## 2020-09-23 RX ADMIN — ZIPRASIDONE HYDROCHLORIDE 40 MG: 40 CAPSULE ORAL at 04:43

## 2020-09-23 RX ADMIN — POTASSIUM CHLORIDE 40 MEQ: 1500 TABLET, EXTENDED RELEASE ORAL at 04:42

## 2020-09-23 RX ADMIN — SODIUM BICARBONATE 650 MG: 650 TABLET ORAL at 12:18

## 2020-09-23 RX ADMIN — BUSPIRONE HYDROCHLORIDE 10 MG: 10 TABLET ORAL at 18:27

## 2020-09-23 RX ADMIN — OXYBUTYNIN CHLORIDE 5 MG: 5 TABLET ORAL at 12:18

## 2020-09-23 RX ADMIN — PREGABALIN 300 MG: 150 CAPSULE ORAL at 18:26

## 2020-09-23 RX ADMIN — METHOCARBAMOL 750 MG: 750 TABLET ORAL at 12:18

## 2020-09-23 RX ADMIN — METHOCARBAMOL 750 MG: 750 TABLET ORAL at 09:20

## 2020-09-23 RX ADMIN — SODIUM BICARBONATE 650 MG: 650 TABLET ORAL at 18:27

## 2020-09-23 RX ADMIN — ASPIRIN 81 MG: 81 TABLET, COATED ORAL at 04:41

## 2020-09-23 RX ADMIN — SODIUM BICARBONATE 650 MG: 650 TABLET ORAL at 09:20

## 2020-09-23 ASSESSMENT — ENCOUNTER SYMPTOMS
SHORTNESS OF BREATH: 0
BLURRED VISION: 0
SEIZURES: 0
FALLS: 0
NAUSEA: 0
DIARRHEA: 0
LOSS OF CONSCIOUSNESS: 0
FEVER: 0
CHILLS: 0
VOMITING: 0

## 2020-09-23 ASSESSMENT — PAIN DESCRIPTION - PAIN TYPE: TYPE: ACUTE PAIN

## 2020-09-23 NOTE — DISCHARGE PLANNING
Anticipated Discharge Disposition: Pending medical team collaboration.     Action: Pt discussed in IDT rounds. Ucx pending. No social work needs reported at this time.     Barriers to Discharge: Medical clearance.     Plan: LSW will continue to follow.

## 2020-09-23 NOTE — CARE PLAN
Problem: Pain Management  Goal: Pain level will decrease to patient's comfort goal  Outcome: PROGRESSING AS EXPECTED     Problem: Psychosocial Needs:  Goal: Level of anxiety will decrease  Outcome: PROGRESSING AS EXPECTED     Problem: Communication  Goal: The ability to communicate needs accurately and effectively will improve  Outcome: PROGRESSING AS EXPECTED

## 2020-09-23 NOTE — PROGRESS NOTES
[facility] Medicine Daily Progress Note    Date of Service  9/23/2020    Chief Complaint  29 y.o. female admitted 9/21/2020 with suprapubic catheter pain and odor at catheter site.    Hospital Course    28 yo F with h/o schizophrenia, borderline personality disorder, type 2 diabetes, morbid obesity, IAST, optic neuritis on cellcept, transverse mellitus, idiopathic intracranial hypertension and urinary retention status post suprapubic catheter placement admitted with pain at suprapubic catheter site and foul odor.  UA concerning for infection.  She was started on meropenem.      Interval Problem Update  - No acute overnight events  - Afebrile  - Bcx NGTD, ucx unfortunately still in process  - Patient reports she's tired, didn't sleep well, however bladder pain improved, catheter site looking better, reports she sees Adia Cao as outpatient urology who recommended macrobid prophylaxis  - c diff negative  - On 1.5L NC    Consultants/Specialty  None      Code Status  DNAR/DNI    Disposition  TBD likely home with grandmother    Review of Systems  Review of Systems   Constitutional: Negative for chills and fever.   HENT: Negative for nosebleeds.    Eyes: Negative for blurred vision.   Respiratory: Negative for shortness of breath.    Cardiovascular: Negative for chest pain and leg swelling.   Gastrointestinal: Negative for diarrhea, nausea and vomiting.   Genitourinary: Negative for hematuria.        Suprapubic catheter with some bladder pain but improving   Musculoskeletal: Negative for falls.   Skin: Negative for rash.   Neurological: Negative for seizures and loss of consciousness.        Physical Exam  Temp:  [35.9 °C (96.6 °F)-37 °C (98.6 °F)] 35.9 °C (96.6 °F)  Pulse:  [70-83] 74  Resp:  [16-20] 18  BP: ()/(54-80) 126/69  SpO2:  [94 %-98 %] 96 %    Physical Exam  Vitals signs and nursing note reviewed.   Constitutional:       General: She is not in acute distress.     Appearance: She is obese. She is not  toxic-appearing or diaphoretic.   HENT:      Head: Normocephalic and atraumatic.      Nose: Nose normal.      Mouth/Throat:      Mouth: Mucous membranes are moist.   Eyes:      General: No scleral icterus.        Right eye: No discharge.         Left eye: No discharge.      Conjunctiva/sclera: Conjunctivae normal.   Neck:      Musculoskeletal: Normal range of motion.   Cardiovascular:      Rate and Rhythm: Normal rate and regular rhythm.      Pulses: Normal pulses.      Heart sounds: No murmur. No friction rub. No gallop.    Pulmonary:      Effort: Pulmonary effort is normal.      Breath sounds: Normal breath sounds.   Abdominal:      General: Abdomen is flat. Bowel sounds are normal. There is no distension.      Palpations: Abdomen is soft.      Tenderness: There is no abdominal tenderness.   Genitourinary:     Comments: Suprapubic catheter in place  Musculoskeletal:      Right lower leg: No edema.      Left lower leg: No edema.   Skin:     General: Skin is warm and dry.   Neurological:      Mental Status: She is alert and oriented to person, place, and time.   Psychiatric:         Mood and Affect: Mood normal.         Behavior: Behavior normal.         Fluids    Intake/Output Summary (Last 24 hours) at 9/23/2020 1358  Last data filed at 9/23/2020 0358  Gross per 24 hour   Intake 240 ml   Output 500 ml   Net -260 ml       Laboratory  Recent Labs     09/21/20  1415 09/22/20  0346   WBC 7.1 6.9   RBC 4.80 4.29   HEMOGLOBIN 13.9 12.4   HEMATOCRIT 43.7 38.8   MCV 91.0 90.4   MCH 29.0 28.9   MCHC 31.8* 32.0*   RDW 47.0 46.3   PLATELETCT 199 146*   MPV 12.1 12.4     Recent Labs     09/21/20  1415 09/22/20  0346 09/23/20  0140   SODIUM 141 140 141   POTASSIUM 3.3* 2.8* 3.7   CHLORIDE 111 113* 116*   CO2 13* 15* 15*   GLUCOSE 114* 97 97   BUN 11 11 9   CREATININE 0.80 0.65 0.62   CALCIUM 9.6 8.5 8.7                   Imaging  IR-US GUIDED PIV   Final Result    Ultrasound-guided PERIPHERAL IV INSERTION performed by     qualified nursing staff as above.           Assessment/Plan  * Urinary tract infection  Assessment & Plan  Admitted with bladder pain and purulent drainage around catheter, afebrile, normal WBC, likely in colonized at this point   History of ESBL  Meropenem 9/21-**  Blood cx NGTD  Ucx pending  Was taking macrobid prophylaxis outpatient  Started her on pain medication monitor for adverse effect of pain medications.    Intracranial hypertension- (present on admission)  Assessment & Plan  Continue diamox, likely cause of metabolic acidosis  On sodium bicarb supplements    Optic neuritis- (present on admission)  Assessment & Plan  Continue cellcept and prednisone    Hypokalemia  Assessment & Plan  Replace as needed.  Continue to monitor.    Seizure (HCC)- (present on admission)  Assessment & Plan  Continue outpatient medications  Seizure precautions.    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HCC)- (present on admission)  Assessment & Plan  She has a history of.  Continue outpatient medications.    Type 2 diabetes mellitus without complication, with long-term current use of insulin (HCC)- (present on admission)  Assessment & Plan  Started her on insulin sliding scale with hypoglycemia protocol.  Continue to monitor.    Psychiatric disorder- (present on admission)  Assessment & Plan  Continue home psych meds    Depression- (present on admission)  Assessment & Plan  Continue outpatient medications.       VTE prophylaxis: Lovenox

## 2020-09-23 NOTE — PROGRESS NOTES
Assumed care at 0700, report received from NOC RN.  Pt A&O x 4, states pain is 2/10. Bed locked, 2 rails up, bed in lowest position. Call light in place, belongings at bedside, no needs at this time and hourly rounding in place.

## 2020-09-24 LAB
ANION GAP SERPL CALC-SCNC: 12 MMOL/L (ref 7–16)
BACTERIA UR CULT: ABNORMAL
BACTERIA UR CULT: ABNORMAL
BUN SERPL-MCNC: 8 MG/DL (ref 8–22)
CALCIUM SERPL-MCNC: 8.5 MG/DL (ref 8.5–10.5)
CHLORIDE SERPL-SCNC: 113 MMOL/L (ref 96–112)
CO2 SERPL-SCNC: 15 MMOL/L (ref 20–33)
CREAT SERPL-MCNC: 0.51 MG/DL (ref 0.5–1.4)
GLUCOSE BLD-MCNC: 112 MG/DL (ref 65–99)
GLUCOSE BLD-MCNC: 96 MG/DL (ref 65–99)
GLUCOSE SERPL-MCNC: 91 MG/DL (ref 65–99)
POTASSIUM SERPL-SCNC: 3.4 MMOL/L (ref 3.6–5.5)
SIGNIFICANT IND 70042: ABNORMAL
SITE SITE: ABNORMAL
SODIUM SERPL-SCNC: 140 MMOL/L (ref 135–145)
SOURCE SOURCE: ABNORMAL

## 2020-09-24 PROCEDURE — A9270 NON-COVERED ITEM OR SERVICE: HCPCS | Performed by: INTERNAL MEDICINE

## 2020-09-24 PROCEDURE — 82962 GLUCOSE BLOOD TEST: CPT

## 2020-09-24 PROCEDURE — 700102 HCHG RX REV CODE 250 W/ 637 OVERRIDE(OP): Performed by: INTERNAL MEDICINE

## 2020-09-24 PROCEDURE — 99232 SBSQ HOSP IP/OBS MODERATE 35: CPT | Performed by: INTERNAL MEDICINE

## 2020-09-24 PROCEDURE — 36415 COLL VENOUS BLD VENIPUNCTURE: CPT

## 2020-09-24 PROCEDURE — 700101 HCHG RX REV CODE 250: Performed by: INTERNAL MEDICINE

## 2020-09-24 PROCEDURE — 700105 HCHG RX REV CODE 258: Performed by: INTERNAL MEDICINE

## 2020-09-24 PROCEDURE — 770021 HCHG ROOM/CARE - ISO PRIVATE

## 2020-09-24 PROCEDURE — 700111 HCHG RX REV CODE 636 W/ 250 OVERRIDE (IP): Performed by: INTERNAL MEDICINE

## 2020-09-24 PROCEDURE — 80048 BASIC METABOLIC PNL TOTAL CA: CPT

## 2020-09-24 RX ORDER — OXYCODONE HYDROCHLORIDE 10 MG/1
10 TABLET ORAL EVERY 6 HOURS PRN
Status: DISCONTINUED | OUTPATIENT
Start: 2020-09-24 | End: 2020-09-26 | Stop reason: HOSPADM

## 2020-09-24 RX ORDER — MICONAZOLE NITRATE 20 MG/G
CREAM TOPICAL EVERY 6 HOURS PRN
Status: DISCONTINUED | OUTPATIENT
Start: 2020-09-24 | End: 2020-09-26 | Stop reason: HOSPADM

## 2020-09-24 RX ORDER — POTASSIUM CHLORIDE 20 MEQ/1
40 TABLET, EXTENDED RELEASE ORAL 2 TIMES DAILY
Status: DISCONTINUED | OUTPATIENT
Start: 2020-09-24 | End: 2020-09-26 | Stop reason: HOSPADM

## 2020-09-24 RX ORDER — GRANULES FOR ORAL 3 G/1
3 POWDER ORAL ONCE
Status: COMPLETED | OUTPATIENT
Start: 2020-09-24 | End: 2020-09-24

## 2020-09-24 RX ADMIN — OXYCODONE HYDROCHLORIDE 10 MG: 10 TABLET ORAL at 03:31

## 2020-09-24 RX ADMIN — SODIUM BICARBONATE 650 MG: 650 TABLET ORAL at 21:00

## 2020-09-24 RX ADMIN — ENOXAPARIN SODIUM 40 MG: 40 INJECTION SUBCUTANEOUS at 06:53

## 2020-09-24 RX ADMIN — BUSPIRONE HYDROCHLORIDE 10 MG: 10 TABLET ORAL at 06:52

## 2020-09-24 RX ADMIN — GABAPENTIN 300 MG: 300 CAPSULE ORAL at 06:52

## 2020-09-24 RX ADMIN — OXCARBAZEPINE 150 MG: 150 TABLET, FILM COATED ORAL at 17:11

## 2020-09-24 RX ADMIN — TOPIRAMATE 50 MG: 25 TABLET, FILM COATED ORAL at 17:11

## 2020-09-24 RX ADMIN — MEROPENEM 500 MG: 500 INJECTION, POWDER, FOR SOLUTION INTRAVENOUS at 06:53

## 2020-09-24 RX ADMIN — GABAPENTIN 300 MG: 300 CAPSULE ORAL at 13:10

## 2020-09-24 RX ADMIN — ONDANSETRON 4 MG: 2 INJECTION INTRAMUSCULAR; INTRAVENOUS at 07:56

## 2020-09-24 RX ADMIN — PREGABALIN 300 MG: 150 CAPSULE ORAL at 17:25

## 2020-09-24 RX ADMIN — MYCOPHENOLATE MOFETIL 1000 MG: 250 CAPSULE ORAL at 17:11

## 2020-09-24 RX ADMIN — MEROPENEM 500 MG: 500 INJECTION, POWDER, FOR SOLUTION INTRAVENOUS at 01:55

## 2020-09-24 RX ADMIN — METHOCARBAMOL 750 MG: 750 TABLET ORAL at 21:00

## 2020-09-24 RX ADMIN — POTASSIUM CHLORIDE 40 MEQ: 1500 TABLET, EXTENDED RELEASE ORAL at 17:11

## 2020-09-24 RX ADMIN — ACETAZOLAMIDE 1000 MG: 500 CAPSULE, EXTENDED RELEASE ORAL at 17:11

## 2020-09-24 RX ADMIN — METHOCARBAMOL 750 MG: 750 TABLET ORAL at 13:10

## 2020-09-24 RX ADMIN — SODIUM BICARBONATE 650 MG: 650 TABLET ORAL at 17:11

## 2020-09-24 RX ADMIN — OXYBUTYNIN CHLORIDE 5 MG: 5 TABLET ORAL at 17:11

## 2020-09-24 RX ADMIN — METHOCARBAMOL 750 MG: 750 TABLET ORAL at 17:12

## 2020-09-24 RX ADMIN — FOSFOMYCIN TROMETHAMINE 3 G: 3 POWDER ORAL at 17:12

## 2020-09-24 RX ADMIN — BUSPIRONE HYDROCHLORIDE 10 MG: 10 TABLET ORAL at 17:11

## 2020-09-24 RX ADMIN — SODIUM BICARBONATE 650 MG: 650 TABLET ORAL at 10:56

## 2020-09-24 RX ADMIN — TOPIRAMATE 50 MG: 25 TABLET, FILM COATED ORAL at 06:52

## 2020-09-24 RX ADMIN — TRAZODONE HYDROCHLORIDE 100 MG: 100 TABLET ORAL at 21:00

## 2020-09-24 RX ADMIN — OXYCODONE HYDROCHLORIDE 10 MG: 10 TABLET ORAL at 09:58

## 2020-09-24 RX ADMIN — METHOCARBAMOL 750 MG: 750 TABLET ORAL at 10:56

## 2020-09-24 RX ADMIN — MYCOPHENOLATE MOFETIL 1000 MG: 250 CAPSULE ORAL at 06:52

## 2020-09-24 RX ADMIN — OXYBUTYNIN CHLORIDE 5 MG: 5 TABLET ORAL at 06:53

## 2020-09-24 RX ADMIN — LEVOTHYROXINE SODIUM 75 MCG: 0.07 TABLET ORAL at 06:53

## 2020-09-24 RX ADMIN — PREDNISONE 10 MG: 10 TABLET ORAL at 06:53

## 2020-09-24 RX ADMIN — SODIUM BICARBONATE 650 MG: 650 TABLET ORAL at 13:10

## 2020-09-24 RX ADMIN — POTASSIUM CHLORIDE 40 MEQ: 1500 TABLET, EXTENDED RELEASE ORAL at 06:52

## 2020-09-24 RX ADMIN — MEROPENEM 500 MG: 500 INJECTION, POWDER, FOR SOLUTION INTRAVENOUS at 13:11

## 2020-09-24 RX ADMIN — FLUOXETINE 40 MG: 20 CAPSULE ORAL at 06:53

## 2020-09-24 RX ADMIN — GABAPENTIN 300 MG: 300 CAPSULE ORAL at 17:12

## 2020-09-24 RX ADMIN — PREGABALIN 300 MG: 150 CAPSULE ORAL at 06:00

## 2020-09-24 RX ADMIN — OXYBUTYNIN CHLORIDE 5 MG: 5 TABLET ORAL at 13:11

## 2020-09-24 RX ADMIN — ACETAZOLAMIDE 1500 MG: 500 CAPSULE, EXTENDED RELEASE ORAL at 06:52

## 2020-09-24 RX ADMIN — ZIPRASIDONE HYDROCHLORIDE 40 MG: 40 CAPSULE ORAL at 17:12

## 2020-09-24 RX ADMIN — ASPIRIN 81 MG: 81 TABLET, COATED ORAL at 06:52

## 2020-09-24 RX ADMIN — ZIPRASIDONE HYDROCHLORIDE 40 MG: 40 CAPSULE ORAL at 06:52

## 2020-09-24 RX ADMIN — OMEPRAZOLE 20 MG: 20 CAPSULE, DELAYED RELEASE ORAL at 06:52

## 2020-09-24 RX ADMIN — OXCARBAZEPINE 150 MG: 150 TABLET, FILM COATED ORAL at 06:53

## 2020-09-24 ASSESSMENT — ENCOUNTER SYMPTOMS
FEVER: 0
SHORTNESS OF BREATH: 0
CHILLS: 0
DIARRHEA: 0
FALLS: 0
VOMITING: 0
NAUSEA: 1
LOSS OF CONSCIOUSNESS: 0
SEIZURES: 0
BLURRED VISION: 0

## 2020-09-24 ASSESSMENT — PAIN DESCRIPTION - PAIN TYPE
TYPE: ACUTE PAIN

## 2020-09-24 NOTE — PROGRESS NOTES
Received change of shift report from day RN. Assumed pt. Care at 1900. Pt. Aox4, pleasant and cooperative, has been sleeping most of shift. 1x PIV, SL when abx not transfusing per pt. Suprapubic cath to DD, site red but w/o drainage, MARK. Plan of care discussed, questions answered. Bed in lowest position with wheels locked, BA on. Call light within reach.

## 2020-09-24 NOTE — CARE PLAN
Assumed day shift care at start of shift  Patient a+o x 4  9/10 discomfort to spt - no request for pain meds at this time, patient reassessed after 10 minutes and found to be sleeping with unlabored breathing, wctm  C/o nausea, iv zofran admin a/o - will reassess within the hour  Monitored on telemetry, vss, afebrile  Iv abx a/o  No needs at this time, wctm    Fall precautions/hourly rounding maintained, call light within reach and functioning, all items within reach.  Patient encouraged to call for assistance, poc reviewed with patient, ?'s/concerns answered.   Bed alarm active    Problem: Psychosocial Needs:  Goal: Level of anxiety will decrease  Outcome: PROGRESSING AS EXPECTED     Problem: Safety  Goal: Will remain free from injury  Outcome: PROGRESSING AS EXPECTED     Problem: Infection  Goal: Will remain free from infection  Outcome: PROGRESSING AS EXPECTED

## 2020-09-24 NOTE — DISCHARGE PLANNING
Anticipated Discharge Disposition: Pending medical team collaboration.     Action: Pt discussed during IDT rounds. Pt not yet medically cleared to d/c. No social work needs reported at this time. Discussion occurred surrounding importance of keeping pt mobilized so she can keep her strength up for d/c.    Barriers to Discharge: Medical clearance.     Plan: LSW will remain available to assist in any d/c planning as needed.

## 2020-09-24 NOTE — CARE PLAN
Problem: Psychosocial Needs:  Goal: Level of anxiety will decrease  Outcome: PROGRESSING AS EXPECTED  Note: Pt. Responds well to emotional support, pleasant and resting w/o s/s of distress throughout shift     Problem: Safety  Goal: Will remain free from falls  Outcome: PROGRESSING AS EXPECTED  Note: Moderate fall precautions, pt. Calls, room near RN's station with door open

## 2020-09-24 NOTE — PROGRESS NOTES
Hospital Medicine Daily Progress Note    Date of Service  9/24/2020    Chief Complaint  29 y.o. female admitted 9/21/2020 with suprapubic catheter pain and odor at catheter site.    Hospital Course    28 yo F with h/o schizophrenia, borderline personality disorder, type 2 diabetes, morbid obesity, IAST, optic neuritis on cellcept, transverse mellitus, idiopathic intracranial hypertension and urinary retention status post suprapubic catheter placement admitted with pain at suprapubic catheter site and foul odor as well as pus from catheter site, no evidence of cellulitis.  UA concerning for infection.  She was started on meropenem then finished with fosfomycin.  Ucx grew klebsiella oxytoca ESBL.  She was found to have hypokalemia so was started on supplementation.  Will need to have this monitored outpatient.       Interval Problem Update  - No acute overnight events  - Afebrile  - Ucx grew klebsiella oxytoca ESBL, switched to fosfomycin x 1   - Reports she's fatigued and nauseous this am    Consultants/Specialty  None      Code Status  DNAR/DNI    Disposition  TBD likely home with grandmother    Review of Systems  Review of Systems   Constitutional: Positive for malaise/fatigue. Negative for chills and fever.   HENT: Negative for nosebleeds.    Eyes: Negative for blurred vision.   Respiratory: Negative for shortness of breath.    Cardiovascular: Negative for chest pain and leg swelling.   Gastrointestinal: Positive for nausea. Negative for diarrhea and vomiting.   Genitourinary: Negative for hematuria.        Suprapubic catheter with some bladder pain but improving   Musculoskeletal: Negative for falls.   Skin: Negative for rash.   Neurological: Negative for seizures and loss of consciousness.        Physical Exam  Temp:  [36 °C (96.8 °F)-36.7 °C (98 °F)] 36.6 °C (97.9 °F)  Pulse:  [64-97] 97  Resp:  [16-18] 18  BP: (107-122)/(60-93) 107/74  SpO2:  [93 %-100 %] 93 %    Physical Exam  Vitals signs and nursing note  reviewed.   Constitutional:       General: She is not in acute distress.     Appearance: She is obese. She is not toxic-appearing or diaphoretic.   HENT:      Head: Normocephalic and atraumatic.      Nose: Nose normal.      Mouth/Throat:      Mouth: Mucous membranes are moist.   Eyes:      General: No scleral icterus.        Right eye: No discharge.         Left eye: No discharge.      Conjunctiva/sclera: Conjunctivae normal.   Neck:      Musculoskeletal: Normal range of motion.   Cardiovascular:      Rate and Rhythm: Normal rate and regular rhythm.      Pulses: Normal pulses.      Heart sounds: No murmur. No friction rub. No gallop.    Pulmonary:      Effort: Pulmonary effort is normal.      Breath sounds: Normal breath sounds.   Abdominal:      General: Abdomen is flat. Bowel sounds are normal. There is no distension.      Palpations: Abdomen is soft.      Tenderness: There is no abdominal tenderness.   Genitourinary:     Comments: Suprapubic catheter in place without any surrounding erythema or drainage  Musculoskeletal:      Right lower leg: No edema.      Left lower leg: No edema.   Skin:     General: Skin is warm and dry.   Neurological:      Mental Status: She is alert and oriented to person, place, and time.   Psychiatric:         Mood and Affect: Mood normal.         Behavior: Behavior normal.         Fluids    Intake/Output Summary (Last 24 hours) at 9/24/2020 1509  Last data filed at 9/24/2020 1300  Gross per 24 hour   Intake 360 ml   Output 400 ml   Net -40 ml       Laboratory  Recent Labs     09/22/20  0346   WBC 6.9   RBC 4.29   HEMOGLOBIN 12.4   HEMATOCRIT 38.8   MCV 90.4   MCH 28.9   MCHC 32.0*   RDW 46.3   PLATELETCT 146*   MPV 12.4     Recent Labs     09/22/20  0346 09/23/20  0140 09/24/20  0032   SODIUM 140 141 140   POTASSIUM 2.8* 3.7 3.4*   CHLORIDE 113* 116* 113*   CO2 15* 15* 15*   GLUCOSE 97 97 91   BUN 11 9 8   CREATININE 0.65 0.62 0.51   CALCIUM 8.5 8.7 8.5                   Imaging  IR-US  GUIDED PIV   Final Result    Ultrasound-guided PERIPHERAL IV INSERTION performed by    qualified nursing staff as above.           Assessment/Plan  * Urinary tract infection  Assessment & Plan  Admitted with bladder pain and purulent drainage around catheter, afebrile, normal WBC, likely is colonized at this point   History of ESBL  Meropenem 9/21-9/24  Fosfomycin x 1 9/24  Blood cx NGTD  Ucx klebsiella oxytoca ESBL  Was taking prophylactic abx outpatient, urology does not recommend doing this at discharge (spoke to Dr. Rosenbaum via tiger text on 9/24/20)  Started her on pain medication monitor for adverse effect of pain medications.    Intracranial hypertension- (present on admission)  Assessment & Plan  Continue diamox, likely cause of metabolic acidosis  On sodium bicarb supplements    Optic neuritis- (present on admission)  Assessment & Plan  Continue cellcept and prednisone    Hypokalemia  Assessment & Plan  S/p supplementation  CTM    Seizure ([facility])- (present on admission)  Assessment & Plan  Continue outpatient medications  Seizure precautions.    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[facility])- (present on admission)  Assessment & Plan  She has a history of.  Continue outpatient medications.    Type 2 diabetes mellitus without complication, with long-term current use of insulin ([facility])- (present on admission)  Assessment & Plan  Started her on insulin sliding scale with hypoglycemia protocol.  Continue to monitor.    Psychiatric disorder- (present on admission)  Assessment & Plan  Continue home psych meds    Depression- (present on admission)  Assessment & Plan  Continue outpatient medications.       VTE prophylaxis: Lovenox

## 2020-09-25 LAB
ALBUMIN SERPL BCP-MCNC: 3.6 G/DL (ref 3.2–4.9)
ANION GAP SERPL CALC-SCNC: 10 MMOL/L (ref 7–16)
BUN SERPL-MCNC: 8 MG/DL (ref 8–22)
CALCIUM SERPL-MCNC: 8.2 MG/DL (ref 8.5–10.5)
CHLORIDE SERPL-SCNC: 117 MMOL/L (ref 96–112)
CO2 SERPL-SCNC: 15 MMOL/L (ref 20–33)
CREAT SERPL-MCNC: 0.57 MG/DL (ref 0.5–1.4)
GLUCOSE SERPL-MCNC: 100 MG/DL (ref 65–99)
MAGNESIUM SERPL-MCNC: 2 MG/DL (ref 1.5–2.5)
POTASSIUM SERPL-SCNC: 3.9 MMOL/L (ref 3.6–5.5)
SODIUM SERPL-SCNC: 142 MMOL/L (ref 135–145)

## 2020-09-25 PROCEDURE — 80048 BASIC METABOLIC PNL TOTAL CA: CPT

## 2020-09-25 PROCEDURE — 700102 HCHG RX REV CODE 250 W/ 637 OVERRIDE(OP): Performed by: INTERNAL MEDICINE

## 2020-09-25 PROCEDURE — 700111 HCHG RX REV CODE 636 W/ 250 OVERRIDE (IP): Performed by: INTERNAL MEDICINE

## 2020-09-25 PROCEDURE — A9270 NON-COVERED ITEM OR SERVICE: HCPCS | Performed by: INTERNAL MEDICINE

## 2020-09-25 PROCEDURE — 770021 HCHG ROOM/CARE - ISO PRIVATE

## 2020-09-25 PROCEDURE — 82040 ASSAY OF SERUM ALBUMIN: CPT

## 2020-09-25 PROCEDURE — 83735 ASSAY OF MAGNESIUM: CPT

## 2020-09-25 PROCEDURE — 99232 SBSQ HOSP IP/OBS MODERATE 35: CPT | Performed by: INTERNAL MEDICINE

## 2020-09-25 PROCEDURE — 36415 COLL VENOUS BLD VENIPUNCTURE: CPT

## 2020-09-25 RX ORDER — CALCIUM CARBONATE 500 MG/1
1500 TABLET, CHEWABLE ORAL DAILY
Status: DISCONTINUED | OUTPATIENT
Start: 2020-09-25 | End: 2020-09-26 | Stop reason: HOSPADM

## 2020-09-25 RX ADMIN — POTASSIUM CHLORIDE 40 MEQ: 1500 TABLET, EXTENDED RELEASE ORAL at 04:44

## 2020-09-25 RX ADMIN — OXYBUTYNIN CHLORIDE 5 MG: 5 TABLET ORAL at 17:28

## 2020-09-25 RX ADMIN — MICONAZOLE NITRATE: 20 CREAM TOPICAL at 16:17

## 2020-09-25 RX ADMIN — PREDNISONE 10 MG: 10 TABLET ORAL at 04:44

## 2020-09-25 RX ADMIN — SODIUM BICARBONATE 650 MG: 650 TABLET ORAL at 08:07

## 2020-09-25 RX ADMIN — TOPIRAMATE 50 MG: 25 TABLET, FILM COATED ORAL at 17:30

## 2020-09-25 RX ADMIN — GABAPENTIN 300 MG: 300 CAPSULE ORAL at 04:44

## 2020-09-25 RX ADMIN — FLUOXETINE 40 MG: 20 CAPSULE ORAL at 04:44

## 2020-09-25 RX ADMIN — GABAPENTIN 300 MG: 300 CAPSULE ORAL at 17:30

## 2020-09-25 RX ADMIN — METHOCARBAMOL 750 MG: 750 TABLET ORAL at 11:59

## 2020-09-25 RX ADMIN — SODIUM BICARBONATE 650 MG: 650 TABLET ORAL at 11:59

## 2020-09-25 RX ADMIN — POTASSIUM CHLORIDE 40 MEQ: 1500 TABLET, EXTENDED RELEASE ORAL at 17:30

## 2020-09-25 RX ADMIN — METHOCARBAMOL 750 MG: 750 TABLET ORAL at 08:07

## 2020-09-25 RX ADMIN — BUSPIRONE HYDROCHLORIDE 10 MG: 10 TABLET ORAL at 04:43

## 2020-09-25 RX ADMIN — TOPIRAMATE 50 MG: 25 TABLET, FILM COATED ORAL at 04:43

## 2020-09-25 RX ADMIN — PREGABALIN 300 MG: 150 CAPSULE ORAL at 17:29

## 2020-09-25 RX ADMIN — GABAPENTIN 300 MG: 300 CAPSULE ORAL at 11:59

## 2020-09-25 RX ADMIN — OXYCODONE HYDROCHLORIDE 10 MG: 10 TABLET ORAL at 11:59

## 2020-09-25 RX ADMIN — ZIPRASIDONE HYDROCHLORIDE 40 MG: 40 CAPSULE ORAL at 17:30

## 2020-09-25 RX ADMIN — OXYCODONE HYDROCHLORIDE 10 MG: 10 TABLET ORAL at 18:01

## 2020-09-25 RX ADMIN — OXCARBAZEPINE 150 MG: 150 TABLET, FILM COATED ORAL at 04:44

## 2020-09-25 RX ADMIN — METHOCARBAMOL 750 MG: 750 TABLET ORAL at 17:30

## 2020-09-25 RX ADMIN — ACETAZOLAMIDE 1000 MG: 500 CAPSULE, EXTENDED RELEASE ORAL at 17:28

## 2020-09-25 RX ADMIN — OXYCODONE HYDROCHLORIDE 10 MG: 10 TABLET ORAL at 05:29

## 2020-09-25 RX ADMIN — BUSPIRONE HYDROCHLORIDE 10 MG: 10 TABLET ORAL at 17:30

## 2020-09-25 RX ADMIN — ENOXAPARIN SODIUM 40 MG: 40 INJECTION SUBCUTANEOUS at 04:43

## 2020-09-25 RX ADMIN — MYCOPHENOLATE MOFETIL 1000 MG: 250 CAPSULE ORAL at 04:44

## 2020-09-25 RX ADMIN — OMEPRAZOLE 20 MG: 20 CAPSULE, DELAYED RELEASE ORAL at 04:44

## 2020-09-25 RX ADMIN — OXYBUTYNIN CHLORIDE 5 MG: 5 TABLET ORAL at 04:44

## 2020-09-25 RX ADMIN — OXCARBAZEPINE 150 MG: 150 TABLET, FILM COATED ORAL at 17:30

## 2020-09-25 RX ADMIN — ACETAZOLAMIDE 1500 MG: 500 CAPSULE, EXTENDED RELEASE ORAL at 04:43

## 2020-09-25 RX ADMIN — MYCOPHENOLATE MOFETIL 1000 MG: 250 CAPSULE ORAL at 17:29

## 2020-09-25 RX ADMIN — TRAZODONE HYDROCHLORIDE 100 MG: 100 TABLET ORAL at 20:49

## 2020-09-25 RX ADMIN — ASPIRIN 81 MG: 81 TABLET, COATED ORAL at 04:44

## 2020-09-25 RX ADMIN — ZIPRASIDONE HYDROCHLORIDE 40 MG: 40 CAPSULE ORAL at 04:43

## 2020-09-25 RX ADMIN — OXYBUTYNIN CHLORIDE 5 MG: 5 TABLET ORAL at 11:59

## 2020-09-25 RX ADMIN — METHOCARBAMOL 750 MG: 750 TABLET ORAL at 20:49

## 2020-09-25 RX ADMIN — ANTACID TABLETS 1500 MG: 500 TABLET, CHEWABLE ORAL at 17:36

## 2020-09-25 RX ADMIN — SODIUM BICARBONATE 650 MG: 650 TABLET ORAL at 17:30

## 2020-09-25 RX ADMIN — PREGABALIN 300 MG: 150 CAPSULE ORAL at 05:29

## 2020-09-25 RX ADMIN — LEVOTHYROXINE SODIUM 75 MCG: 0.07 TABLET ORAL at 04:44

## 2020-09-25 RX ADMIN — SODIUM BICARBONATE 650 MG: 650 TABLET ORAL at 20:49

## 2020-09-25 ASSESSMENT — ENCOUNTER SYMPTOMS
DIARRHEA: 0
NAUSEA: 0
FALLS: 0
CHILLS: 0
SEIZURES: 0
BLURRED VISION: 0
LOSS OF CONSCIOUSNESS: 0
SHORTNESS OF BREATH: 0
VOMITING: 0
FEVER: 0

## 2020-09-25 ASSESSMENT — PAIN DESCRIPTION - PAIN TYPE
TYPE: ACUTE PAIN

## 2020-09-25 NOTE — DISCHARGE PLANNING
Anticipated Discharge Disposition: TBD.    Action: Pt discussed in IDT rounds. Pt may be able to go home with her mother this weekend if mother is willing to take her home to assume care over the weekend. LSW attempted to call mother to talk about d/c planning. No answer, LSW left non-emergent VM requesting a call back asap.    LSW also met pt at bedside to see if she feels safe going home with her mother. Pt states she feels safe doing so.    Barriers to Discharge: Placement.     Plan: LSW will f/u with mother again this afternoon.     Addendum:   1245: LSW received call back from pt's mother, Emmy. Emmy explained that they're expecting pt's grandfather to pass away on hospice sometime today. Mother agreeable to pt's discharge and said she is able to come pick her up tomorrow morning to take her home. Dr. Maloney notified of update.

## 2020-09-25 NOTE — DOCUMENTATION QUERY
"                                                                         [facility]                                                                       Query Response Note      PATIENT:               HARLEY DE LA CRUZ  ACCT #:                  [id number]  MRN:                     [id number]  :                      1990  ADMIT DATE:       2020 1:22 PM  DISCH DATE:          RESPONDING  PROVIDER #:        [id number]           QUERY TEXT:    \"Urinary tract infection due to presence of suprapubic catheter\" is documented in the narrative of the H&P however this relationship is not documented in subsequent notes. Please clarify status of this condition:    NOTE:  If an appropriate response is not listed below, please respond with a new note.    The patient's Clinical Indicators include:  H&P: Complicated urinary tract infection due to presence of suprapubic catheter    MD PN: Ucx grew klebsiella oxytoca ESBL. UTI, afebrile, normal WBC, likely colonized at this point   Treatment: Meropenem; fosfomycin x 1; SP catheter change; lab testing  Risk Factors: SP catheter; hx ESBL UTI; DM  Options provided:   -- Urinary tract infection due to presence of suprapubic catheter ruled in   -- UTI is unrelated to suprapubic catheter   -- Unable to determine      Query created by: Camelia Rubio on 2020 8:29 AM    RESPONSE TEXT:    Urinary tract infection due to presence of suprapubic catheter ruled in          Electronically signed by:  TIMOTHY AWAD MD 2020 11:55 AM              "

## 2020-09-25 NOTE — PROGRESS NOTES
"Bedside report received.  Assessment complete.  A&O x 4. Patient calls appropriately.  Patient ambulates with one assist. Bed alarm on.   Patient has 7-8/10 pain. Pain managed with prescribed medications.  Denies N&V. Tolerating regular diet.  Suprapubic catheter to void, + flatus, + BM.  Patient denies SOB.  SCD's off.  Patient is calm and cooperative.  Review plan with of care with patient. Call light and personal belongings with in reach. Hourly rounding in place. All needs met at this time.  /57   Pulse 69   Temp 36.4 °C (97.5 °F) (Temporal)   Resp 20   Ht 1.651 m (5' 5\")   Wt 115 kg (253 lb 8.5 oz)   SpO2 96%   BMI 42.19 kg/m²     "

## 2020-09-26 VITALS
DIASTOLIC BLOOD PRESSURE: 54 MMHG | RESPIRATION RATE: 18 BRPM | SYSTOLIC BLOOD PRESSURE: 97 MMHG | HEART RATE: 76 BPM | HEIGHT: 65 IN | TEMPERATURE: 97.7 F | BODY MASS INDEX: 42.24 KG/M2 | OXYGEN SATURATION: 95 % | WEIGHT: 253.53 LBS

## 2020-09-26 PROBLEM — N39.0 URINARY TRACT INFECTION: Status: RESOLVED | Noted: 2017-03-09 | Resolved: 2020-09-26

## 2020-09-26 LAB
25(OH)D3 SERPL-MCNC: 14 NG/ML (ref 30–100)
BACTERIA BLD CULT: NORMAL
BACTERIA BLD CULT: NORMAL
SIGNIFICANT IND 70042: NORMAL
SIGNIFICANT IND 70042: NORMAL
SITE SITE: NORMAL
SITE SITE: NORMAL
SOURCE SOURCE: NORMAL
SOURCE SOURCE: NORMAL

## 2020-09-26 PROCEDURE — 99239 HOSP IP/OBS DSCHRG MGMT >30: CPT | Performed by: INTERNAL MEDICINE

## 2020-09-26 PROCEDURE — 36415 COLL VENOUS BLD VENIPUNCTURE: CPT

## 2020-09-26 PROCEDURE — A9270 NON-COVERED ITEM OR SERVICE: HCPCS | Performed by: INTERNAL MEDICINE

## 2020-09-26 PROCEDURE — 700102 HCHG RX REV CODE 250 W/ 637 OVERRIDE(OP): Performed by: INTERNAL MEDICINE

## 2020-09-26 PROCEDURE — 700111 HCHG RX REV CODE 636 W/ 250 OVERRIDE (IP): Performed by: INTERNAL MEDICINE

## 2020-09-26 PROCEDURE — 82306 VITAMIN D 25 HYDROXY: CPT

## 2020-09-26 RX ORDER — MICONAZOLE NITRATE 20 MG/G
CREAM TOPICAL
Qty: 35 G | Refills: 0 | Status: SHIPPED
Start: 2020-09-26 | End: 2020-10-08

## 2020-09-26 RX ORDER — CALCIUM CARBONATE 750 MG/1
1500 TABLET, CHEWABLE ORAL DAILY
Qty: 60 TAB | Refills: 0 | Status: SHIPPED
Start: 2020-09-26 | End: 2021-04-15

## 2020-09-26 RX ORDER — ERGOCALCIFEROL 1.25 MG/1
50000 CAPSULE ORAL
Status: DISCONTINUED | OUTPATIENT
Start: 2020-09-26 | End: 2020-09-26 | Stop reason: HOSPADM

## 2020-09-26 RX ADMIN — PREDNISONE 10 MG: 10 TABLET ORAL at 05:41

## 2020-09-26 RX ADMIN — OMEPRAZOLE 20 MG: 20 CAPSULE, DELAYED RELEASE ORAL at 05:40

## 2020-09-26 RX ADMIN — OXYBUTYNIN CHLORIDE 5 MG: 5 TABLET ORAL at 05:41

## 2020-09-26 RX ADMIN — POTASSIUM CHLORIDE 40 MEQ: 1500 TABLET, EXTENDED RELEASE ORAL at 05:42

## 2020-09-26 RX ADMIN — TOPIRAMATE 50 MG: 25 TABLET, FILM COATED ORAL at 05:41

## 2020-09-26 RX ADMIN — LEVOTHYROXINE SODIUM 75 MCG: 0.07 TABLET ORAL at 05:41

## 2020-09-26 RX ADMIN — METHOCARBAMOL 750 MG: 750 TABLET ORAL at 10:01

## 2020-09-26 RX ADMIN — MYCOPHENOLATE MOFETIL 1000 MG: 250 CAPSULE ORAL at 05:40

## 2020-09-26 RX ADMIN — BUSPIRONE HYDROCHLORIDE 10 MG: 10 TABLET ORAL at 05:41

## 2020-09-26 RX ADMIN — ENOXAPARIN SODIUM 40 MG: 40 INJECTION SUBCUTANEOUS at 05:42

## 2020-09-26 RX ADMIN — ERGOCALCIFEROL 50000 UNITS: 1.25 CAPSULE ORAL at 08:15

## 2020-09-26 RX ADMIN — ZIPRASIDONE HYDROCHLORIDE 40 MG: 40 CAPSULE ORAL at 05:40

## 2020-09-26 RX ADMIN — ASPIRIN 81 MG: 81 TABLET, COATED ORAL at 05:41

## 2020-09-26 RX ADMIN — OXYCODONE HYDROCHLORIDE 10 MG: 10 TABLET ORAL at 05:41

## 2020-09-26 RX ADMIN — FLUOXETINE 40 MG: 20 CAPSULE ORAL at 05:40

## 2020-09-26 RX ADMIN — PREGABALIN 300 MG: 150 CAPSULE ORAL at 05:41

## 2020-09-26 RX ADMIN — GABAPENTIN 300 MG: 300 CAPSULE ORAL at 05:41

## 2020-09-26 RX ADMIN — OXCARBAZEPINE 150 MG: 150 TABLET, FILM COATED ORAL at 05:40

## 2020-09-26 RX ADMIN — ACETAZOLAMIDE 1500 MG: 500 CAPSULE, EXTENDED RELEASE ORAL at 05:40

## 2020-09-26 ASSESSMENT — PAIN DESCRIPTION - PAIN TYPE
TYPE: ACUTE PAIN
TYPE: ACUTE PAIN

## 2020-09-26 NOTE — PROGRESS NOTES
Discharge instructions reviewed and signed by patient. No further questions at this time. IV DC'd by RN. All belongings gathered and given to patient. Patient left via car with mother. Morning medications given with exception of sodium bicarbonate. It was not available on the floor.

## 2020-09-26 NOTE — PROGRESS NOTES
Assumed care of patient at 1900. Pt is A&Ox4, x1 assist with fww. Denies pain or nausea. PIV patent and saline locked. Contact precautions for ESBL in urine. Suprapubic cath draining to gravity. Tele monitor in place. Call light within reach, hourly rounding in place.

## 2020-09-26 NOTE — CARE PLAN
Problem: Infection  Goal: Will remain free from infection  Outcome: PROGRESSING AS EXPECTED  Note: Patient has some yellow crust surrounding suprapubic cath. RN cleaned with saline and gauze.      Problem: Pain Management  Goal: Pain level will decrease to patient's comfort goal  Outcome: PROGRESSING SLOWER THAN EXPECTED  Flowsheets  Taken 9/26/2020 0800 by Yamilka Roberson RLESLYE  Comfort Goal:   Comfort with Movement   Perform Activity  Pain Rating Scale (NPRS): 8  Taken 9/26/2020 0546 by Yanci George RLESLYE  Non Verbal Scale: Calm  Note: Patient states pain is 8/10 after being medicated at 0541.

## 2020-09-26 NOTE — DISCHARGE SUMMARY
Discharge Summary    CHIEF COMPLAINT ON ADMISSION  Chief Complaint   Patient presents with   • Pain     at site of suprapubic catheter   • Other     foul odor at suprapubic site with discharge       Reason for Admission  other     Admission Date  9/21/2020    CODE STATUS  DNAR/DNI    HPI & HOSPITAL COURSE    28 yo F with h/o schizophrenia, borderline personality disorder, type 2 diabetes, morbid obesity, IAST, optic neuritis on cellcept, transverse mellitus, idiopathic intracranial hypertension and urinary retention status post suprapubic catheter placement admitted with pain at suprapubic catheter site and foul odor as well as pus from catheter site, no evidence of cellulitis.  UA concerning for infection.  She was started on meropenem then finished with fosfomycin.  Ucx grew klebsiella oxytoca ESBL. She was treated with meropenem then transitioned to fosfomycin.  She had improvement in her bladder symptoms.  She was never febrile or had elevated WBC.  She was started on barrier cream for suprapubic catheter.  She was found to have hypokalemia and hypocalcemia so was started on supplementation.  Will need to have this monitored outpatient.  She had suprapubic catheter changed on 9/21/20. She will need close urology outpatient, recommended she call their office Monday 9/28/20.  She was ambulating halls with nursing staff and eating meals in the chair.     Therefore, she is discharged in fair and stable condition to home with close outpatient follow-up.    The patient met 2-midnight criteria for an inpatient stay at the time of discharge.    Discharge Date  9/26/2020    FOLLOW UP ITEMS POST DISCHARGE  Monitor calcium, potassium levels  Close urology follow up    DISCHARGE DIAGNOSES  Principal Problem (Resolved):    Urinary tract infection POA: Unknown  Active Problems:    Optic neuritis POA: Yes    Intracranial hypertension POA: Yes    Hypokalemia POA: Unknown    Psychiatric disorder POA: Yes      Overview: Patient's  grandmother states that Kristin has Multiple Personality       Disorder.    Vitamin D deficiency POA: Unknown    Hypocalcemia POA: Yes    Type 2 diabetes mellitus without complication, with long-term current use of insulin (HCC) POA: Yes    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HCC) POA: Yes    Seizure (HCC) POA: Yes    Depression POA: Yes      FOLLOW UP  Future Appointments   Date Time Provider Department Center   11/12/2020 10:40 AM John Leon M.D. [facility] None     No follow-up provider specified.    MEDICATIONS ON DISCHARGE     Medication List      START taking these medications      Instructions   calcium carbonate 750 MG chewable tablet  Commonly known as: TUMS EX   Take 2 Tabs by mouth every day.  Dose: 1,500 mg     miconazole 2%-zinc oxide 2 % Crea topical cream   Apply around suprapubic catheter site as needed.        CONTINUE taking these medications      Instructions   acetaZOLAMIDE  MG Cp12  Commonly known as: DIAMOX   Take 1,000-1,500 mg by mouth 2 times a day. 1500mg in AM  1000mg at PM  Dose: 1,000-1,500 mg     albuterol 108 (90 Base) MCG/ACT Aers inhalation aerosol   Inhale 2 Puffs by mouth every 6 hours as needed for Shortness of Breath.  Dose: 2 Puff     aspirin EC 81 MG Tbec  Commonly known as: ECOTRIN   Take 81 mg by mouth every day.  Dose: 81 mg     busPIRone 10 MG Tabs tablet  Commonly known as: BUSPAR   Take 10 mg by mouth 2 times a day.  Dose: 10 mg     CellCept 500 MG tablet  Generic drug: mycophenolate   Take 1,000 mg by mouth 2 times a day.  Dose: 1,000 mg     Coenzyme Q10 300 MG Caps   Take 300 mg by mouth every day.  Dose: 300 mg     Corlanor 7.5 MG Tabs tablet  Generic drug: ivabradine   Take 7.5 mg by mouth 2 times a day, with meals.  Dose: 7.5 mg     fluoxetine 40 MG capsule  Commonly known as: PROZAC   Take 1 Cap by mouth every day.  Dose: 40 mg     gabapentin 300 MG Caps  Commonly known as: NEURONTIN   Take 300 mg by mouth 3 times a day.  Dose: 300 mg     insulin glargine 100 UNIT/ML  Soln  Commonly known as: Lantus   Inject 8 Units as instructed every evening.  Dose: 8 Units     levothyroxine 75 MCG Tabs  Commonly known as: SYNTHROID   Take 1 Tab by mouth Every morning on an empty stomach.  Dose: 75 mcg     lidocaine 2 % Soln  Commonly known as: XYLOCAINE   Take 5 mL by mouth as needed for Throat/Mouth Pain (q6hr PRN throat pain, ok to rinse and spit or swallow).  Dose: 5 mL     Lyrica 300 MG capsule  Generic drug: pregabalin   Take 300 mg by mouth 2 times a day.  Dose: 300 mg     magnesium oxide 400 MG Tabs tablet  Commonly known as: MAG-OX   Take 400 mg by mouth every day.  Dose: 400 mg     Melatonin 10 MG Tabs   Take 10 mg by mouth every evening.  Dose: 10 mg     methocarbamol 750 MG Tabs  Commonly known as: ROBAXIN   Take 750 mg by mouth 4 times a day.  Dose: 750 mg     Myrbetriq 50 MG Tb24  Generic drug: Mirabegron ER   Take 50 mg by mouth every day.  Dose: 50 mg     Nexplanon 68 MG Impl implant  Generic drug: etonogestrel   Inject 1 Each as instructed Once.  Dose: 1 Each     nystatin 472622 UNIT/GM Crea topical cream  Commonly known as: MYCOSTATIN   Apply 1 Application to affected area(s) 2 times a day as needed (To folds, prevention of fungal growth.).  Dose: 1 Application     omeprazole 20 MG delayed-release capsule  Commonly known as: PRILOSEC   Take 1 Cap by mouth every day.  Dose: 20 mg     ondansetron 4 MG Tbdp  Commonly known as: ZOFRAN ODT   Take 4 mg by mouth every 6 hours as needed for Nausea.  Dose: 4 mg     OXcarbazepine 150 MG Tabs  Commonly known as: TRILEPTAL   Take 150 mg by mouth 2 times a day.  Dose: 150 mg     oxybutynin 5 MG Tabs  Commonly known as: DITROPAN   Take 1 Tab by mouth 3 times a day.  Dose: 5 mg     oxyCODONE immediate release 10 MG immediate release tablet  Commonly known as: ROXICODONE   Take 1 Tab by mouth every four hours as needed for up to 15 days.  Dose: 10 mg     potassium chloride SA 20 MEQ Tbcr  Commonly known as: Kdur   Take 20 mEq by mouth 2 times  "a day.  Dose: 20 mEq     predniSONE 10 MG Tabs  Commonly known as: DELTASONE   Take 10 mg by mouth every day. Maintenance Medication.  Dose: 10 mg     Riboflavin 400 MG Tabs   Take 400 mg by mouth every day.  Dose: 400 mg     sodium bicarbonate 650 MG Tabs  Commonly known as: SODIUM BICARBONATE   Take 650 mg by mouth 4 times a day.  Dose: 650 mg     topiramate 50 MG tablet  Commonly known as: TOPAMAX   Take 1 Tab by mouth 2 Times a Day.  Dose: 50 mg     traZODone 100 MG Tabs  Commonly known as: DESYREL   Take 1 Tab by mouth every bedtime.  Dose: 100 mg     Trulicity 1.5 MG/0.5ML Sopn  Generic drug: Dulaglutide   Inject 0.5 mL as instructed every Friday.  Dose: 0.5 mL     vitamin D (Ergocalciferol) 1.25 MG (47078 UT) Caps capsule  Commonly known as: DRISDOL   Take 50,000 Units by mouth every 7 days.  Dose: 50,000 Units     ziprasidone 40 MG Caps  Commonly known as: GEODON   Take 1 Cap by mouth 2 Times a Day.  Dose: 40 mg        STOP taking these medications    nitrofurantoin 100 MG Caps  Commonly known as: MACROBID            Allergies  Allergies   Allergen Reactions   • Depakote [Divalproex Sodium] Unspecified     Muscle spasms/muscle pain and weakness     • Amitriptyline Unspecified     Headaches     • Aripiprazole [Abilify] Unspecified     Headaches/muscle twitching     • Clindamycin Nausea     Even with food     • Flagyl [Metronidazole Hcl] Unspecified     \"eye problems\"     • Flomax [Tamsulosin Hydrochloride] Swelling   • Levaquin Unspecified     Severe muscle cramps in legs  RXN=unknown   • Metformin Unspecified     Increased lactic acid      • Tape Rash     Tears skin off  coban with Tegaderm tape ok intermittently  RXN=ongoing   • Vancomycin Itching     Pt becomes flushed in face and chest.   RXN=7/10/16   • Wound Dressing Adhesive Hives     By pt report   • Ciprofloxacin    • Hydromorphone Hcl      Pt states she feels loopy   • Keflex Rash     Pt states she gets a rash with this medication  Tolerates " ceftriaxone   • Levofloxacin Unspecified     Leg muscle cramps   • Metronidazole Rash     .   • Penicillins    • Tamsulosin    • Valproic Acid Rash     .       DIET  No orders of the defined types were placed in this encounter.      ACTIVITY  As tolerated.  Weight bearing as tolerated    CONSULTATIONS  None    PROCEDURES  Suprapubic catheter exchange 9/21    Discharge Exam:  Physical Exam   Constitutional: She is oriented to person, place, and time and well-developed, well-nourished, and in no distress.   HENT:   Head: Normocephalic and atraumatic.   Mouth/Throat: Oropharynx is clear and moist.   Eyes: Conjunctivae are normal. No scleral icterus.   Neck: Normal range of motion. Neck supple.   Cardiovascular: Normal rate and regular rhythm. Exam reveals no gallop and no friction rub.   No murmur heard.  Pulmonary/Chest: Effort normal and breath sounds normal. No respiratory distress. She has no wheezes. She has no rales.   Abdominal: Soft. Bowel sounds are normal. She exhibits no distension. There is no abdominal tenderness.   Genitourinary:    Genitourinary Comments: Suprapubic catheter in place no drainage or surrounding erythema     Musculoskeletal:         General: No edema.   Neurological: She is alert and oriented to person, place, and time. Gait normal.   Skin: Skin is warm and dry. No rash noted.   Psychiatric: Mood and affect normal.         LABORATORY  Lab Results   Component Value Date    SODIUM 142 09/25/2020    POTASSIUM 3.9 09/25/2020    CHLORIDE 117 (H) 09/25/2020    CO2 15 (L) 09/25/2020    GLUCOSE 100 (H) 09/25/2020    BUN 8 09/25/2020    CREATININE 0.57 09/25/2020    CREATININE 0.75 (L) 07/20/2010    GLOMRATE >59 07/20/2010        Lab Results   Component Value Date    WBC 6.9 09/22/2020    WBC 6.1 07/20/2010    HEMOGLOBIN 12.4 09/22/2020    HEMATOCRIT 38.8 09/22/2020    PLATELETCT 146 (L) 09/22/2020      Results     Procedure Component Value Units Date/Time    BLOOD CULTURE x2 [[id number]] Collected:  "09/21/20 1415    Order Status: Completed Specimen: Blood from Peripheral Updated: 09/26/20 1500     Significant Indicator NEG     Source BLD     Site PERIPHERAL     Culture Result No growth after 5 days of incubation.    Narrative:      Per Hospital Policy: Only change Specimen Src: to \"Line\" if  specified by physician order.  Left Hand    URINE CULTURE(NEW) [[id number]]  (Abnormal)  (Susceptibility) Collected: 09/21/20 1639    Order Status: Completed Specimen: Urine Updated: 09/24/20 1106     Significant Indicator POS     Source UR     Site -     Culture Result -      Klebsiella oxytoca ESBL  10-50,000 cfu/mL  Extended Spectrum Beta-lactamase (ESBL) isolated.  ESBL's may be clinically resistant to therapy with  Penicillins,Cephalosporins or Aztreonam despite  apparent in vitro susceptibility to some of these agents.  The patient requires contact isolation.  Please contact pharmacy or an Infectious Disease Specialist  if you have any questions about appropriate therapy.      Narrative:      CALL  [facility]  [facility] tel. [phone],  CALLED  U tel. [phone] 09/24/2020, 11:06, RB PERF. RESULTS CALLED TO: RN  [id number]    Susceptibility     Klebsiella oxytoca esbl (1)     Antibiotic Interpretation Microscan Method Status    Ampicillin Resistant >16 mcg/mL TYLER Final    Ceftriaxone Extended Spectrum Beta Lactamase >32 mcg/mL TYLER Final    Ceftazidime Resistant <=1 mcg/mL TYLER Final    Cefotaxime Extended Spectrum Beta Lactamase 8 mcg/mL TYLER Final    Cefazolin Resistant >16 mcg/mL TYLER Final    Ciprofloxacin Sensitive <=1 mcg/mL TYLER Final    Ampicillin/sulbactam Resistant >16/8 mcg/mL TYLER Final    Cefepime Resistant 4 mcg/mL TYLER Final    Tobramycin Sensitive <=4 mcg/mL TYLER Final    Cefotetan Sensitive <=16 mcg/mL TYLER Final    Nitrofurantoin Resistant >64 mcg/mL TYLER Final    Gentamicin Sensitive <=4 mcg/mL TYLER Final    Levofloxacin Sensitive <=2 mcg/mL TYLER Final    Pip/Tazobactam Resistant >64 mcg/mL TYLER Final    Trimeth/Sulfa " "Resistant >2/38 mcg/mL TYLER Final                   BLOOD CULTURE x2 [[id number]] Collected: 09/21/20 1512    Order Status: Completed Specimen: Blood from Peripheral Updated: 09/22/20 0739     Significant Indicator NEG     Source BLD     Site PERIPHERAL     Culture Result No Growth  Note: Blood cultures are incubated for 5 days and  are monitored continuously.Positive blood cultures  are called to the RN and reported as soon as  they are identified.      Narrative:      Per Hospital Policy: Only change Specimen Src: to \"Line\" if  specified by physician order.  Left Hand    SARS-CoV-2, PCR (In-House) [[id number]] Collected: 09/21/20 1945    Order Status: Completed Updated: 09/21/20 2326     SARS-CoV-2 Source NP Swab     SARS-CoV-2 by PCR NotDetected     Comment: [facility] providers: PLEASE REFER TO DE-ESCALATION AND RETESTING PROTOCOL  on [email or web address]  **The TaqPath COVID-19 SARS-CoV-2 test has been made available for use under  the Emergency Use Authorization (EUA) only.         Narrative:      Special Contact Isolation  Is patient being admitted?->Yes  Does this patient meet criteria for Rush/Cepheid per [facility]  Inpatient Workflow? (See workflow link below)->No  Expected turn around time?->Routine (In-House PCR up to 24  hours)  Is this the patients First SARS CoV-2 test?->No  Is this patient employed in healthcare?->No  Is the patient symptomatic as defined by the CDC?->No  Is the patient hospitalized?->Yes  Is the patient in the ICU?->Unknown  Is the patient a resident in a congregate care  setting?->Unknown  Is the patient pregnant?->No  Special Contact Isolation  Is patient being admitted?->Yes  Does this patient meet criteria for Rush/Cepheid per [facility]  Inpatient Workflow? (See workflow link below)->Yes  Expected turn around time?->Rush (Cepheid 2-4 hours)  Is this the patients First SARS CoV-2 test?->No  Is this patient employed in healthcare?->No  Is the patient symptomatic as defined by the CDC?->No  Is the " patient hospitalized?->No  Is the patient a resident in a congregate care setting?->No  Is the patient pregnant?->No    Routine (COVID/SARS COV-2 In-House PCR up to 24 hours) [[id number]] Collected: 09/21/20 1945    Order Status: Completed Specimen: Respirate from Nasopharyngeal Updated: 09/21/20 1959     COVID Order Status Received     Comment: The order for SARS CoV-2 testing has been received by the  Laboratory. This result is neither positive nor negative.  Final results of testing will report in 24-48 hours, separately.         Narrative:      Special Contact Isolation  Is patient being admitted?->Yes  Does this patient meet criteria for Rush/Cepheid per [facility]  Inpatient Workflow? (See workflow link below)->No  Expected turn around time?->Routine (In-House PCR up to 24  hours)  Is this the patients First SARS CoV-2 test?->No  Is this patient employed in healthcare?->No  Is the patient symptomatic as defined by the CDC?->No  Is the patient hospitalized?->Yes  Is the patient in the ICU?->Unknown  Is the patient a resident in a congregate care  setting?->Unknown  Is the patient pregnant?->No  Special Contact Isolation  Is patient being admitted?->Yes  Does this patient meet criteria for Rush/Cepheid per [facility]  Inpatient Workflow? (See workflow link below)->Yes  Expected turn around time?->Rush (Cepheid 2-4 hours)  Is this the patients First SARS CoV-2 test?->No  Is this patient employed in healthcare?->No  Is the patient symptomatic as defined by the CDC?->No  Is the patient hospitalized?->No  Is the patient a resident in a congregate care setting?->No  Is the patient pregnant?->No    COVID/SARS CoV-2 PCR [[id number]]     Order Status: Canceled Specimen: Respirate from Nasopharyngeal     C Diff by PCR rflx Toxin [[id number]]     Order Status: Canceled Specimen: Stool     URINALYSIS,CULTURE IF INDICATED [[id number]]  (Abnormal) Collected: 09/21/20 1639    Order Status: Completed Specimen: Urine Updated: 09/21/20 6216      Color Yellow     Character Turbid     Specific Gravity 1.015     Ph 5.5     Glucose Negative mg/dL      Ketones Negative mg/dL      Protein >=1000 mg/dL      Bilirubin Negative     Urobilinogen, Urine 0.2     Nitrite Negative     Leukocyte Esterase Moderate     Occult Blood Large     Micro Urine Req Microscopic    URINE CULTURE (ADD ON) [[id number]] Collected: 09/21/20 1639    Order Status: Canceled Specimen: Other from Urine, Suprapubic             Total time of the discharge process 36 minutes.

## 2020-09-26 NOTE — PROGRESS NOTES
Received report from night shift RN and assumed care of patient at change of shift. Patient is A&O x 4. Patient reports 8/10 pain at this time. Unable to medicate as patient was previously medicated at 0541 and has medication available Q6hrs. Patient states understanding. Patient expresses concern that there is new sores in the perineum area. RN did not note any redness or sores of any kind. No other concerns or signs of distress at this time. Bed is in lowest, locked position, call light and belongings are within reach. Patient calls for assistance and bed alarm is on.  All other needs met.

## 2020-09-26 NOTE — PROGRESS NOTES
[facility] Medicine Daily Progress Note    Date of Service  9/25/2020    Chief Complaint  29 y.o. female admitted 9/21/2020 with suprapubic catheter pain and odor at catheter site.    Hospital Course    30 yo F with h/o schizophrenia, borderline personality disorder, type 2 diabetes, morbid obesity, IAST, optic neuritis on cellcept, transverse mellitus, idiopathic intracranial hypertension and urinary retention status post suprapubic catheter placement admitted with pain at suprapubic catheter site and foul odor as well as pus from catheter site, no evidence of cellulitis.  UA concerning for infection.  She was started on meropenem then finished with fosfomycin.  Ucx grew klebsiella oxytoca ESBL. She was treated with meropenem then transitioned to fosfomycin.  She was found to have hypokalemia and hypocalcemia so was started on supplementation.  Will need to have this monitored outpatient.       Interval Problem Update  - No acute overnight events  - Afebrile  - Reports having some mucus come out of her while on the toilet, no more pus around catheter site though  - K improved    Consultants/Specialty  None      Code Status  DNAR/DNI    Disposition  TBD likely home with mother/grandmother    Review of Systems  Review of Systems   Constitutional: Positive for malaise/fatigue. Negative for chills and fever.   HENT: Negative for nosebleeds.    Eyes: Negative for blurred vision.   Respiratory: Negative for shortness of breath.    Cardiovascular: Negative for chest pain and leg swelling.   Gastrointestinal: Negative for diarrhea, nausea and vomiting.   Genitourinary: Negative for hematuria.        Suprapubic catheter with some bladder pain but improving   Musculoskeletal: Negative for falls.   Skin: Negative for rash.   Neurological: Negative for seizures and loss of consciousness.        Physical Exam  Temp:  [35.9 °C (96.6 °F)-36.4 °C (97.5 °F)] 36.2 °C (97.2 °F)  Pulse:  [65-81] 78  Resp:  [18-20] 18  BP:  (103-115)/(57-74) 111/70  SpO2:  [91 %-98 %] 96 %    Physical Exam  Vitals signs and nursing note reviewed.   Constitutional:       General: She is not in acute distress.     Appearance: She is obese. She is not toxic-appearing or diaphoretic.   HENT:      Head: Normocephalic and atraumatic.      Nose: Nose normal.      Mouth/Throat:      Mouth: Mucous membranes are moist.   Eyes:      General: No scleral icterus.        Right eye: No discharge.         Left eye: No discharge.      Conjunctiva/sclera: Conjunctivae normal.   Neck:      Musculoskeletal: Normal range of motion.   Cardiovascular:      Rate and Rhythm: Normal rate and regular rhythm.      Pulses: Normal pulses.      Heart sounds: No murmur. No friction rub. No gallop.    Pulmonary:      Effort: Pulmonary effort is normal.      Breath sounds: Normal breath sounds.   Abdominal:      General: Abdomen is flat. Bowel sounds are normal. There is no distension.      Palpations: Abdomen is soft.      Tenderness: There is no abdominal tenderness.   Genitourinary:     Comments: Suprapubic catheter in place without any surrounding erythema or drainage  Musculoskeletal:      Right lower leg: No edema.      Left lower leg: No edema.   Skin:     General: Skin is warm and dry.   Neurological:      Mental Status: She is alert and oriented to person, place, and time.      Gait: Gait normal.   Psychiatric:         Mood and Affect: Mood normal.         Behavior: Behavior normal.         Fluids    Intake/Output Summary (Last 24 hours) at 9/25/2020 1705  Last data filed at 9/25/2020 1645  Gross per 24 hour   Intake 360 ml   Output 1700 ml   Net -1340 ml       Laboratory      Recent Labs     09/23/20  0140 09/24/20  0032 09/25/20  0114   SODIUM 141 140 142   POTASSIUM 3.7 3.4* 3.9   CHLORIDE 116* 113* 117*   CO2 15* 15* 15*   GLUCOSE 97 91 100*   BUN 9 8 8   CREATININE 0.62 0.51 0.57   CALCIUM 8.7 8.5 8.2*                   Imaging  IR-US GUIDED PIV   Final Result     Ultrasound-guided PERIPHERAL IV INSERTION performed by    qualified nursing staff as above.           Assessment/Plan  * Urinary tract infection  Assessment & Plan  Admitted with bladder pain and purulent drainage around catheter, afebrile, normal WBC, likely is colonized at this point   History of ESBL  Meropenem 9/21-9/24  Fosfomycin x 1 9/24  Blood cx NGTD  Ucx klebsiella oxytoca ESBL  Was taking prophylactic abx outpatient, urology does not recommend doing this at discharge (spoke to Dr. Rosenbaum via tiger text on 9/24/20)  Started her on pain medication monitor for adverse effect of pain medications.    Intracranial hypertension- (present on admission)  Assessment & Plan  Continue diamox, likely cause of metabolic acidosis  On sodium bicarb supplements    Optic neuritis- (present on admission)  Assessment & Plan  Continue cellcept and prednisone    Hypokalemia  Assessment & Plan  S/p supplementation  CTM    Seizure (HCC)- (present on admission)  Assessment & Plan  Continue outpatient medications  Seizure precautions.    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HCC)- (present on admission)  Assessment & Plan  She has a history of.  Continue outpatient medications.    Type 2 diabetes mellitus without complication, with long-term current use of insulin (HCC)- (present on admission)  Assessment & Plan  Started her on insulin sliding scale with hypoglycemia protocol.  Continue to monitor.    Hypocalcemia- (present on admission)  Assessment & Plan  Starting supplementation  Checking vit D    Psychiatric disorder- (present on admission)  Assessment & Plan  Continue home psych meds    Depression- (present on admission)  Assessment & Plan  Continue outpatient medications.       VTE prophylaxis: Lovenox

## 2020-09-26 NOTE — DISCHARGE INSTRUCTIONS
"Discharge Instructions    Discharged to home by car with relative. Discharged via wheelchair, hospital escort: Yes.  Special equipment needed: Not Applicable    Be sure to schedule a follow-up appointment with your primary care doctor or any specialists as instructed.     Discharge Plan:   Diet Plan: Discussed  Activity Level: Discussed  Confirmed Follow up Appointment: Appointment Scheduled  Confirmed Symptoms Management: Discussed  Medication Reconciliation Updated: Yes  Influenza Vaccine Indication: Patient Refuses    I understand that a diet low in cholesterol, fat, and sodium is recommended for good health. Unless I have been given specific instructions below for another diet, I accept this instruction as my diet prescription.   Other diet: Cardiac; watch sodium intake    Special Instructions: None    · Is patient discharged on Warfarin / Coumadin?   No     Catheter-Associated Urinary Tract Infection FAQs  What is \"catheter-associated urinary tract infection\"?   A urinary tract infection (also called “UTI”) is an infection in the urinary system, which includes the bladder (which stores the urine) and the kidneys (which filter the blood to make urine). Germs (for example, bacteria or yeasts) do not normally live in these areas; but if germs are introduced, an infection can occur.  If you have a urinary catheter, germs can travel along the catheter and cause an infection in your bladder or your kidney; in that case it is called a catheter-associated urinary tract infection (or “CA-UTI”).  What is a urinary catheter?   A urinary catheter is a thin tube placed in the bladder to drain urine. Urine drains through the tube into a bag that collects the urine. A urinary catheter may be used:  · If you are not able to urinate on your own  · To measure the amount of urine that you make, for example, during intensive care  · During and after some types of surgery  · During some tests of the kidneys and bladder  People with " urinary catheters have a much higher chance of getting a urinary tract infection than people who don’t have a catheter.  How do I get a catheter-associated urinary tract infection (CA-UTI)?   If germs enter the urinary tract, they may cause an infection. Many of the germs that cause a catheter-associated urinary tract infection are common germs found in your intestines that do not usually cause an infection there. Germs can enter the urinary tract when the catheter is being put in or while the catheter remains in the bladder.  What are the symptoms of a urinary tract infection?   Some of the common symptoms of a urinary tract infection are:  · Burning or pain in the lower abdomen (that is, below the stomach)  · Fever  · Bloody urine may be a sign of infection, but is also caused by other problems  · Burning during urination or an increase in the frequency of urination after the catheter is removed.  Sometimes people with catheter-associated urinary tract infections do not have these symptoms of infection.  Can catheter-associated urinary tract infections be treated?   Yes, most catheter-associated urinary tract infections can be treated with antibiotics and removal or change of the catheter. Your doctor will determine which antibiotic is best for you.  What are some of the things that hospitals are doing to prevent catheter-associated urinary tract infections?   To prevent urinary tract infections, doctors and nurses take the following actions.  Catheter insertion  · Catheters are put in only when necessary and they are removed as soon as possible.  · Only properly trained persons insert catheters using sterile (“clean”) technique.  · The skin in the area where the catheter will be inserted is cleaned before inserting the catheter.  · Other methods to drain the urine are sometimes used, such as:  ¨ External catheters in men (these look like condoms and are placed over the penis rather than into the penis)  ¨ Putting  a temporary catheter in to drain the urine and removing it right away. This is called intermittent urethral catheterization.  Catheter care  · Healthcare providers clean their hands by washing them with soap and water or using an alcohol-based hand rub before and after touching your catheter.  · If you do not see your providers clean their hands, please ask them to do so.  · Avoid disconnecting the catheter and drain tube. This helps to prevent germs from getting into the catheter tube.  · The catheter is secured to the leg to prevent pulling on the catheter.  · Avoid twisting or kinking the catheter.  · Keep the bag lower than the bladder to prevent urine from backflowing to the bladder.  · Empty the bag regularly. The drainage spout should not touch anything while emptying the bag.  What can I do to help prevent catheter-associated urinary tract infections if I have a catheter?  · Always clean your hands before and after doing catheter care.  · Always keep your urine bag below the level of your bladder.  · Do not tug or pull on the tubing.  · Do not twist or kink the catheter tubing.  · Ask your healthcare provider each day if you still need the catheter.  What do I need to do when I go home from the hospital?  · If you will be going home with a catheter, your doctor or nurse should explain everything you need to know about taking care of the catheter. Make sure you understand how to care for it before you leave the hospital.  · If you develop any of the symptoms of a urinary tract infection, such as burning or pain in the lower abdomen, fever, or an increase in the frequency of urination, contact your doctor or nurse immediately.  · Before you go home, make sure you know who to contact if you have questions or problems after you get home.  If you have questions, please ask your doctor or nurse.   Developed and co-sponsored by The Society for Healthcare Epidemiology of [place] (SHEA); Infectious Diseases Society  of [place] (IDSA); American Hospital Association; Association for Professionals in Infection Control and Epidemiology (APIC); Centers for Disease Control and Prevention (CDC); and The Joint Commission.   This information is not intended to replace advice given to you by your health care provider. Make sure you discuss any questions you have with your health care provider.  Document Released: 09/11/2013 Document Revised: 05/31/2017 Document Reviewed: 03/02/2016  [employer] Interactive Patient Education © 2017 [employer]      Depression / Suicide Risk    As you are discharged from this [facility] facility, it is important to learn how to keep safe from harming yourself.    Recognize the warning signs:  · Abrupt changes in personality, positive or negative- including increase in energy   · Giving away possessions  · Change in eating patterns- significant weight changes-  positive or negative  · Change in sleeping patterns- unable to sleep or sleeping all the time   · Unwillingness or inability to communicate  · Depression  · Unusual sadness, discouragement and loneliness  · Talk of wanting to die  · Neglect of personal appearance   · Rebelliousness- reckless behavior  · Withdrawal from people/activities they love  · Confusion- inability to concentrate     If you or a loved one observes any of these behaviors or has concerns about self-harm, here's what you can do:  · Talk about it- your feelings and reasons for harming yourself  · Remove any means that you might use to hurt yourself (examples: pills, rope, extension cords, firearm)  · Get professional help from the community (Mental Health, Substance Abuse, psychological counseling)  · Do not be alone:Call your Safe Contact- someone whom you trust who will be there for you.  · Call your local CRISIS HOTLINE 191-8979 or 253-834-1258  · Call your local Children's Mobile Crisis Response Team [place] (157) 963-7116 or www.Audingo  · Call the toll free  National Suicide Prevention Hotlines   · National Suicide Prevention Lifeline 663-835-JPHE (7495)  · [facility] Line Network 800-SUICIDE (187-5118)      Discharge Instructions per Tatyana Maloney M.D.    Ms. Balderrama,    You were admitted to [facility] with bladder pain and drainage around suprapubic catheter site.  You still had a mild urinary tract infection and were treated with IV antibiotics.  You had improvement in your symptoms.  You were given a cream to apply around the supracatheter site. You were also found to have low potassium, calcium and vitamin D and were started on those supplements.  You will need follow up with your urologist (please call to make an appointment ASAP) as well as your regular doctor to monitor your electrolytes.     Return to ER if you have fevers, nausea/vomiting (and signs of dehydration), syncope, or any other concerning symptoms.       Suprapubic Catheter Home Guide  A suprapubic catheter is a flexible tube that is used to drain urine from the bladder into a collection bag outside the body. The catheter is inserted into the bladder through a small opening in the lower abdomen, above the pubic bone (suprapubic area) and a few inches below your belly button (navel). A tiny balloon filled with germ-free (sterile) water helps to keep the catheter in place.  The collection bag must be emptied at least once a day and cleaned at least every other day. The collection bag can be put beside your bed at night and attached to your leg during the day. You may have a large collection bag to use at night and a smaller one to use during the day.  Your suprapubic catheter may need to be changed every 4-6 weeks, or as often as recommended by your health care provider. Healing of the tract where the catheter is placed can take 6 weeks to 6 months. During that time, your health care provider may change your catheter. Once the tract is well healed, you or a caregiver will change your suprapubic  catheter at home.  What are the risks?  This catheter is safe to use. However, problems can occur, including:  · Blocked urine flow. This can occur if the catheter stops working, or if you have a blood clot in your bladder or in the catheter.  · Irritation of the skin around the catheter.  · Infection. This can happen if bacteria gets into your bladder.  Supplies needed:  · Two pairs of sterile gloves.  · Paper towels.  · Catheter.  · Two syringes.  · Sterile water.  · Sterile cleaning solution.  · Lubricant.  · Collection bags.  How to change the catheter    1. Drink plenty of fluids during the hours before you change the catheter.  2. Wash your hands with soap and water. If soap and water are not available, use hand .  3. Draw up sterile water into a syringe to have ready to fill the new catheter balloon. The amount will depend on the size of the balloon.  4. Have all of your supplies ready and close to you on a paper towel.  5. Lie on your back, sitting slightly upright so that you can see the catheter and opening.  6. Put on sterile gloves.  7. Clean the skin around the catheter opening using the sterile cleaning solution.  8. Remove the water from the balloon in the catheter using a syringe.  9. Slowly remove the catheter. If the catheter seems stuck, or if you have difficulty removing it:  ? Do not pull on it.  ? Call your health care provider right away.  10. Place the old catheter on a paper towel to discard later.  11. Take off the used gloves, and put on a new pair.  12. Put lubricant on the end of the new catheter that will go into your bladder.  13. Clean the skin around the catheter opening using the sterile cleaning solution.  14. Gently slide the catheter through the opening in your abdomen and into the tract that leads to your bladder.  15. Wait for some urine to start flowing through the catheter.  16. When urine starts to flow through the catheter, attach the collection bag to the end of  the catheter. Make sure the connection is tight.  17. Use a syringe to fill the catheter balloon with sterile water. Fill to the amount directed by your health care provider.  18. Remove the gloves and wash your hands with soap and water.  How to care for the skin around the catheter  Follow your health care provider's instructions on caring for your skin.  · Use a clean washcloth and soapy water to clean the skin around your catheter every day. Pat the area dry with a clean paper towel.  · Do not pull on the catheter.  · Do not use ointment or lotion on this area, unless told by your health care provider.  · Check the skin around the catheter every day for signs of infection. Check for:  ? Redness, swelling, or pain.  ? Fluid or blood.  ? Warmth.  ? Pus or a bad smell.  How to empty and clean the collection bag  Empty the large collection bag every 8 hours. Empty the small collection bag when it is about ? full. Clean the collection bag every 2-3 days, or as often as told by your health care provider. To do this:  1. Wash your hands with soap and water. If soap and water are not available, use hand .  2. Disconnect the bag from the catheter and immediately attach a new bag to the catheter.  3. Hold the used bag over the toilet or another container.  4. Turn the valve (spigot) at the bottom of the bag to empty the urine. Empty the used bag completely.  ? Do not touch the opening of the spigot.  ? Do not let the opening touch the toilet or container.  5. Close the spigot tightly when the bag is empty.  6. Clean the used bag in one of the following methods:  ? According to the 's instructions.  ? As told by your health care provider.  7. Let the bag dry completely. Put it in a clean plastic bag before storing it.  General tips    · Always wash your hands before and after caring for your catheter and collection bag. Use a mild, fragrance-free soap. If soap and water are not available, use hand  .  · Clean the outside of the catheter with soap and water as often as told by your health care provider.  · Always make sure there are no twists or kinks in the catheter tube.  · Always make sure there are no leaks in the catheter or collection bag.  · Always wear the leg bag below your knee.  · Make sure the overnight drainage bag is always lower than the level of your bladder, but do not let it touch the floor. Before you go to sleep, hang the bag inside a wastebasket that is covered by a clean plastic bag.  · Drink enough fluid to keep your urine pale yellow.  · Do not take baths, swim, or use a hot tub until your health care provider approves. Ask your health care provider if you may take showers.  Contact a melissa care provider if:  · You leak urine.  · You have redness, swelling, or pain around your catheter.  · You have fluid or blood coming from your catheter opening.  · Your catheter opening feels warm to the touch.  · You have pus or a bad smell coming from your catheter opening.  · You have a fever or chills.  · Your urine flow slows down.  · Your urine becomes cloudy or smelly.  Get help right away if:  · Your catheter comes out.  · You have:  ? Nausea.  ? Back pain.  ? Difficulty changing your catheter.  ? Blood in your urine.  ? No urine flow for 1 hour.  Summary  · A suprapubic catheter is a flexible tube that is used to drain urine from the bladder into a collection bag outside the body.  · Your suprapubic catheter may need to be changed every 4-6 weeks, or as recommended by your health care provider.  · Follow instructions on how to change the catheter and how to empty and clean the collection bag.  · Always wash your hands before and after caring for your catheter and collection bag. Drink enough fluid to keep your urine pale yellow.  · Get help right away if you have difficulty changing your catheter or if there is blood in your urine.  This information is not intended to replace advice  given to you by your health care provider. Make sure you discuss any questions you have with your health care provider.  Document Released: 09/04/2012 Document Revised: 04/09/2020 Document Reviewed: 01/22/2020  [employer]vier Patient Education © 2020 [employer]

## 2020-09-26 NOTE — CARE PLAN
Problem: Safety  Goal: Will remain free from injury  Outcome: PROGRESSING AS EXPECTED  Intervention: Provide assistance with mobility  Note: Pt is A&Ox4, up x1 assist. Bed alarm on for safety, pt calls appropriately for assistance.      Problem: Knowledge Deficit  Goal: Knowledge of disease process/condition, treatment plan, diagnostic tests, and medications will improve  Outcome: PROGRESSING AS EXPECTED  Intervention: Explain information regarding disease process/condition, treatment plan, diagnostic tests, and medications and document in education  Note: Discussed POC with patient, questions answered.

## 2020-09-28 ENCOUNTER — PATIENT OUTREACH (OUTPATIENT)
Dept: HEALTH INFORMATION MANAGEMENT | Facility: OTHER | Age: 30
End: 2020-09-28

## 2020-09-28 ENCOUNTER — TELEPHONE (OUTPATIENT)
Dept: MEDICAL GROUP | Facility: MEDICAL CENTER | Age: 30
End: 2020-09-28

## 2020-09-28 NOTE — TELEPHONE ENCOUNTER
"VOICEMAIL  1. Caller Name: Kristin                      Call Back Number: [phone]    2. Message: Patient called and would like a lab work order to check her potassium. She is having muscle cramping and stated that her muscles are \"acting up.\" Please advise.    3. Patient approves office to leave a detailed voicemail/MyChart message: N\A    "

## 2020-09-29 SDOH — ECONOMIC STABILITY: FOOD INSECURITY: WITHIN THE PAST 12 MONTHS, THE FOOD YOU BOUGHT JUST DIDN'T LAST AND YOU DIDN'T HAVE MONEY TO GET MORE.: NEVER TRUE

## 2020-09-29 SDOH — HEALTH STABILITY: MENTAL HEALTH: HOW OFTEN DO YOU HAVE A DRINK CONTAINING ALCOHOL?: NEVER

## 2020-09-29 SDOH — ECONOMIC STABILITY: HOUSING INSECURITY: IN THE LAST 12 MONTHS, HOW MANY PLACES HAVE YOU LIVED?: 1

## 2020-09-29 SDOH — HEALTH STABILITY: MENTAL HEALTH: HOW OFTEN DO YOU HAVE 6 OR MORE DRINKS ON ONE OCCASION?: NEVER

## 2020-09-29 SDOH — ECONOMIC STABILITY: HOUSING INSECURITY
IN THE LAST 12 MONTHS, WAS THERE A TIME WHEN YOU DID NOT HAVE A STEADY PLACE TO SLEEP OR SLEPT IN A SHELTER (INCLUDING NOW)?: NO

## 2020-09-29 SDOH — ECONOMIC STABILITY: TRANSPORTATION INSECURITY
IN THE PAST 12 MONTHS, HAS THE LACK OF TRANSPORTATION KEPT YOU FROM MEDICAL APPOINTMENTS OR FROM GETTING MEDICATIONS?: NO

## 2020-09-29 SDOH — ECONOMIC STABILITY: INCOME INSECURITY: IN THE LAST 12 MONTHS, WAS THERE A TIME WHEN YOU WERE NOT ABLE TO PAY THE MORTGAGE OR RENT ON TIME?: NO

## 2020-09-29 SDOH — ECONOMIC STABILITY: FOOD INSECURITY: WITHIN THE PAST 12 MONTHS, YOU WORRIED THAT YOUR FOOD WOULD RUN OUT BEFORE YOU GOT MONEY TO BUY MORE.: NEVER TRUE

## 2020-09-29 SDOH — ECONOMIC STABILITY: INCOME INSECURITY: HOW HARD IS IT FOR YOU TO PAY FOR THE VERY BASICS LIKE FOOD, HOUSING, MEDICAL CARE, AND HEATING?: NOT VERY HARD

## 2020-09-29 SDOH — ECONOMIC STABILITY: TRANSPORTATION INSECURITY
IN THE PAST 12 MONTHS, HAS LACK OF TRANSPORTATION KEPT YOU FROM MEETINGS, WORK, OR FROM GETTING THINGS NEEDED FOR DAILY LIVING?: NO

## 2020-09-29 NOTE — PROGRESS NOTES
"Community Care Management Intake  · Social determinates of health intake complete.   · Identified barriers to none.  · Contact information provided to Kristin Balderrama   · Discharge Follow up: Scheduled for Urologist and PCP 9/30/2020  · Inpatient assessment completed.     Patient is known to this CCM RN from chronic care management episode 11/2018 - 3/2019. Outgoing call to follow up on patient status after discharge from [facility] on 9/26/2020. Patient answered but sounded sleepy. Patient answered all medical status questions with \"fine.\" Patient agreeable to PCP and urology appointment scheduling assistance, stating, \"I don't want to talk to anybody today.\" Appointments scheduled for tomorrow, 9/30/2020.       Plan: Follow up call after PCP and urology appointments to assess for any additional needs.     "

## 2020-09-30 ENCOUNTER — OFFICE VISIT (OUTPATIENT)
Dept: MEDICAL GROUP | Facility: MEDICAL CENTER | Age: 30
End: 2020-09-30
Payer: MEDICARE

## 2020-09-30 VITALS
WEIGHT: 257 LBS | TEMPERATURE: 97.8 F | BODY MASS INDEX: 42.82 KG/M2 | HEART RATE: 70 BPM | OXYGEN SATURATION: 96 % | HEIGHT: 65 IN | SYSTOLIC BLOOD PRESSURE: 126 MMHG | DIASTOLIC BLOOD PRESSURE: 62 MMHG

## 2020-09-30 DIAGNOSIS — N39.0 URINARY TRACT INFECTION WITHOUT HEMATURIA, SITE UNSPECIFIED: ICD-10-CM

## 2020-09-30 DIAGNOSIS — G89.29 CHRONIC PAIN OF LEFT ANKLE: ICD-10-CM

## 2020-09-30 DIAGNOSIS — M25.572 CHRONIC PAIN OF LEFT ANKLE: ICD-10-CM

## 2020-09-30 DIAGNOSIS — E87.6 HYPOKALEMIA: ICD-10-CM

## 2020-09-30 PROCEDURE — 99214 OFFICE O/P EST MOD 30 MIN: CPT | Performed by: INTERNAL MEDICINE

## 2020-09-30 ASSESSMENT — FIBROSIS 4 INDEX: FIB4 SCORE: 0.3

## 2020-09-30 NOTE — PROGRESS NOTES
CC: Hospital follow-up urinary tract infection, hypokalemia, worsening pain.                                                                                                                                      HPI:   Kristin presents today with the following.    1. Urinary tract infection without hematuria, site unspecified  Presents after being hospitalized for difficult to treat urinary tract infection that is catheter related.  She is still completing course of antibiotics.  She saw her urologist this morning and there is a local infection around the catheter site is improving in nature also.  No fevers or chills.    2. Hypokalemia  Was found to be hypokalemic while in hospital put on replacement she is needing repeat labs drawn.    3. Chronic pain of left ankle  Recent possible avulsion fracture of the left ankle she is up ambulating more and reports increasing pain in the area.  Denies any direct injury.      Patient Active Problem List    Diagnosis Date Noted   • Intracranial hypertension 02/27/2020     Priority: High   • Optic neuritis 05/27/2019     Priority: High   • Polypharmacy 06/27/2016     Priority: High   • Bilateral heel pain secondary to calcaneal bone spurs 03/28/2016     Priority: High   • Hypokalemia 09/29/2019     Priority: Medium   • Diabetes mellitus type 2 in obese ([facility]) 04/13/2017     Priority: Medium   • Presence of suprapubic catheter ([facility]) 02/16/2017     Priority: Medium   • Morbidly obese ([facility]) 03/07/2016     Priority: Medium   • H/O prior ablation treatment 02/10/2016     Priority: Medium   • Immunocompromised ([facility]) 11/06/2019     Priority: Low   • History of atrial fibrillation and cardiomyopathy? 11/06/2019     Priority: Low   • Generalized weakness 09/30/2019     Priority: Low   • Hypothyroidism 08/04/2017     Priority: Low   • Bacteriuria 05/13/2017     Priority: Low   • Depression 10/28/2016     Priority: Low   • Schizophrenia ([facility]) 10/27/2016     Priority: Low   • GERD  (gastroesophageal reflux disease) 03/07/2016     Priority: Low   • Peripheral neuropathy and chornic pain syndrome ([facility]) 03/06/2016     Priority: Low   • Fatty liver disease, nonalcoholic 01/19/2015     Priority: Low   • Anxiety 12/16/2014     Priority: Low   • Knee pain, right 02/13/2014     Priority: Low   • Borderline personality disorder in adult ([facility]) 09/18/2010     Priority: Low   • Debility 09/13/2020   • Poor fluid intake 09/07/2020   • Seizure ([facility]) 09/04/2020   • Wrist injury, right, initial encounter 08/21/2020   • Dry eyes 08/18/2020   • Pain in both feet 08/17/2020   • Blurred vision 08/15/2020   • 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[facility]) 08/15/2020   • Idiopathic intracranial hypertension 08/15/2020   • Other fatigue 06/29/2020   • Type 2 diabetes mellitus without complication, with long-term current use of insulin ([facility]) 05/11/2020   • History of supraventricular tachycardia 04/20/2020   • Schizoaffective disorder, depressive type ([facility]) 03/02/2020   • PTSD (post-traumatic stress disorder) 03/02/2020   • Stress incontinence 12/27/2019   • Mixed stress and urge urinary incontinence 12/27/2019   • Increased frequency of urination 12/27/2019   • History of optic neuritis 12/17/2019   • Mild intermittent asthma without complication 12/16/2019   • Hypocalcemia 11/30/2019   • SVT (supraventricular tachycardia) ([facility]) 04/10/2019   • Functional diarrhea 01/05/2018   • Bowel and bladder incontinence 10/27/2016   • Galactorrhea 07/22/2016   • Vitamin D deficiency 05/21/2016   • Scoliosis 03/07/2016   • Psychiatric disorder 10/22/2009       Current Outpatient Medications   Medication Sig Dispense Refill   • calcium carbonate (TUMS EX) 750 MG chewable tablet Take 2 Tabs by mouth every day. 60 Tab 0   • miconazole 2%-zinc oxide (THUY) 2 % Cream topical cream Apply around suprapubic catheter site as needed. 35 g 0   • vitamin D, Ergocalciferol, (DRISDOL) 1.25 MG (13403 UT) Cap capsule Take 50,000 Units by mouth every 7 days.      • lidocaine (XYLOCAINE) 2 % Solution Take 5 mL by mouth as needed for Throat/Mouth Pain (q6hr PRN throat pain, ok to rinse and spit or swallow). 120 mL 0   • insulin glargine (LANTUS) 100 UNIT/ML Solution Inject 8 Units as instructed every evening. 10 mL 0   • fluoxetine (PROZAC) 40 MG capsule Take 1 Cap by mouth every day. 30 Cap 0   • levothyroxine (SYNTHROID) 75 MCG Tab Take 1 Tab by mouth Every morning on an empty stomach. 30 Tab 0   • topiramate (TOPAMAX) 50 MG tablet Take 1 Tab by mouth 2 Times a Day. 60 Tab 3   • traZODone (DESYREL) 100 MG Tab Take 1 Tab by mouth every bedtime. 30 Tab 3   • ziprasidone (GEODON) 40 MG Cap Take 1 Cap by mouth 2 Times a Day. 60 Cap 0   • omeprazole (PRILOSEC) 20 MG delayed-release capsule Take 1 Cap by mouth every day. 30 Cap 0   • oxybutynin (DITROPAN) 5 MG Tab Take 1 Tab by mouth 3 times a day. 90 Tab 0   • acetaZOLAMIDE SR (DIAMOX) 500 MG CAPSULE SR 12 HR Take 1,000-1,500 mg by mouth 2 times a day. 1500mg in AM  1000mg at PM     • busPIRone (BUSPAR) 10 MG Tab tablet Take 10 mg by mouth 2 times a day.     • Coenzyme Q10 300 MG Cap Take 300 mg by mouth every day.     • ivabradine (CORLANOR) 7.5 MG Tab tablet Take 7.5 mg by mouth 2 times a day, with meals.     • magnesium oxide (MAG-OX) 400 MG Tab tablet Take 400 mg by mouth every day.     • ondansetron (ZOFRAN ODT) 4 MG TABLET DISPERSIBLE Take 4 mg by mouth every 6 hours as needed for Nausea.     • OXcarbazepine (TRILEPTAL) 150 MG Tab Take 150 mg by mouth 2 times a day.     • predniSONE (DELTASONE) 10 MG Tab Take 10 mg by mouth every day. Maintenance Medication.     • Riboflavin 400 MG Tab Take 400 mg by mouth every day.     • Dulaglutide (TRULICITY) 1.5 MG/0.5ML Solution Pen-injector Inject 0.5 mL as instructed every Friday.     • gabapentin (NEURONTIN) 300 MG Cap Take 300 mg by mouth 3 times a day.     • methocarbamol (ROBAXIN) 750 MG Tab Take 750 mg by mouth 4 times a day.     • Mirabegron ER (MYRBETRIQ) 50 MG TABLET SR 24  HR Take 50 mg by mouth every day.     • sodium bicarbonate (SODIUM BICARBONATE) 650 MG Tab Take 650 mg by mouth 4 times a day.     • nystatin (MYCOSTATIN) 786220 UNIT/GM Cream topical cream Apply 1 Application to affected area(s) 2 times a day as needed (To folds, prevention of fungal growth.).     • pregabalin (LYRICA) 300 MG capsule Take 300 mg by mouth 2 times a day.     • aspirin EC (ECOTRIN) 81 MG Tablet Delayed Response Take 81 mg by mouth every day.     • potassium chloride SA (KDUR) 20 MEQ Tab CR Take 20 mEq by mouth 2 times a day.     • etonogestrel (NEXPLANON) 68 MG Implant implant Inject 1 Each as instructed Once.     • mycophenolate (CELLCEPT) 500 MG tablet Take 1,000 mg by mouth 2 times a day.     • albuterol 108 (90 Base) MCG/ACT Aero Soln inhalation aerosol Inhale 2 Puffs by mouth every 6 hours as needed for Shortness of Breath.     • Melatonin 10 MG Tab Take 10 mg by mouth every evening.       No current facility-administered medications for this visit.          Allergies as of 09/30/2020 - Reviewed 09/30/2020   Allergen Reaction Noted   • Depakote [divalproex sodium] Unspecified 06/14/2010   • Amitriptyline Unspecified 10/31/2013   • Aripiprazole [abilify] Unspecified 01/17/2013   • Clindamycin Nausea 02/02/2011   • Flagyl [metronidazole hcl] Unspecified 03/31/2011   • Flomax [tamsulosin hydrochloride] Swelling 09/24/2009   • Levaquin Unspecified 10/31/2013   • Metformin Unspecified 07/23/2013   • Tape Rash 08/15/2012   • Vancomycin Itching 07/10/2016   • Wound dressing adhesive Hives 01/12/2018   • Ciprofloxacin  01/16/2020   • Hydromorphone hcl  01/16/2020   • Keflex Rash 01/01/2017   • Levofloxacin Unspecified 10/27/2016   • Metronidazole Rash 03/31/2011   • Penicillins  01/16/2020   • Tamsulosin  09/24/2009   • Valproic acid Rash 06/14/2010        ROS: Denies Chest pain, SOB, LE edema.    /62 (BP Location: Left arm, Patient Position: Sitting)   Pulse 70   Temp 36.6 °C (97.8 °F)   Ht  "1.651 m (5' 5\")   Wt 116.6 kg (257 lb)   SpO2 96%   BMI 42.77 kg/m²     Physical Exam:  Gen:         Alert and oriented, No apparent distress.  Neck:        No Lymphadenopathy or Bruits.  Lungs:     Clear to auscultation bilaterally  CV:          Regular rate and rhythm. No murmurs, rubs or gallops.               Ext:          No clubbing, cyanosis, edema.      Assessment and Plan.   29 y.o. female with the following issues.    1. Urinary tract infection without hematuria, site unspecified  No overwhelming infection symptoms currently certainly improved from hospitalization she will follow-up with urology.    2. Hypokalemia  Repeat blood work  - Basic Metabolic Panel; Future    3. Chronic pain of left ankle  Vahe x-ray referral to orthopedics on any worsening fractures.   - DX-ANKLE 3+ VIEWS LEFT; Future      "

## 2020-10-01 ENCOUNTER — HOSPITAL ENCOUNTER (OUTPATIENT)
Dept: RADIOLOGY | Facility: MEDICAL CENTER | Age: 30
End: 2020-10-01
Attending: INTERNAL MEDICINE
Payer: MEDICARE

## 2020-10-01 DIAGNOSIS — G89.29 CHRONIC PAIN OF LEFT ANKLE: ICD-10-CM

## 2020-10-01 DIAGNOSIS — M25.572 CHRONIC PAIN OF LEFT ANKLE: ICD-10-CM

## 2020-10-01 PROCEDURE — 73610 X-RAY EXAM OF ANKLE: CPT | Mod: LT

## 2020-10-06 ENCOUNTER — HOSPITAL ENCOUNTER (OUTPATIENT)
Dept: LAB | Facility: MEDICAL CENTER | Age: 30
End: 2020-10-06
Attending: INTERNAL MEDICINE
Payer: MEDICARE

## 2020-10-06 DIAGNOSIS — E87.6 HYPOKALEMIA: ICD-10-CM

## 2020-10-06 LAB
ANION GAP SERPL CALC-SCNC: 14 MMOL/L (ref 7–16)
BUN SERPL-MCNC: 12 MG/DL (ref 8–22)
CALCIUM SERPL-MCNC: 9.1 MG/DL (ref 8.5–10.5)
CHLORIDE SERPL-SCNC: 111 MMOL/L (ref 96–112)
CO2 SERPL-SCNC: 14 MMOL/L (ref 20–33)
CREAT SERPL-MCNC: 0.72 MG/DL (ref 0.5–1.4)
GLUCOSE SERPL-MCNC: 105 MG/DL (ref 65–99)
POTASSIUM SERPL-SCNC: 3.7 MMOL/L (ref 3.6–5.5)
SODIUM SERPL-SCNC: 139 MMOL/L (ref 135–145)

## 2020-10-06 PROCEDURE — 36415 COLL VENOUS BLD VENIPUNCTURE: CPT

## 2020-10-06 PROCEDURE — 80048 BASIC METABOLIC PNL TOTAL CA: CPT

## 2020-10-08 ENCOUNTER — HOSPITAL ENCOUNTER (EMERGENCY)
Facility: MEDICAL CENTER | Age: 30
DRG: 699 | End: 2020-10-08
Attending: EMERGENCY MEDICINE
Payer: MEDICARE

## 2020-10-08 ENCOUNTER — APPOINTMENT (OUTPATIENT)
Dept: RADIOLOGY | Facility: MEDICAL CENTER | Age: 30
DRG: 699 | End: 2020-10-08
Attending: EMERGENCY MEDICINE
Payer: MEDICARE

## 2020-10-08 VITALS
BODY MASS INDEX: 42.61 KG/M2 | OXYGEN SATURATION: 96 % | HEART RATE: 63 BPM | SYSTOLIC BLOOD PRESSURE: 119 MMHG | TEMPERATURE: 96.8 F | RESPIRATION RATE: 20 BRPM | HEIGHT: 65 IN | DIASTOLIC BLOOD PRESSURE: 69 MMHG | WEIGHT: 255.73 LBS

## 2020-10-08 DIAGNOSIS — Z93.59 SUPRAPUBIC CATHETER (HCC): ICD-10-CM

## 2020-10-08 DIAGNOSIS — R10.84 GENERALIZED ABDOMINAL PAIN: ICD-10-CM

## 2020-10-08 LAB
ALBUMIN SERPL BCP-MCNC: 4.1 G/DL (ref 3.2–4.9)
ALBUMIN/GLOB SERPL: 1.9 G/DL
ALP SERPL-CCNC: 85 U/L (ref 30–99)
ALT SERPL-CCNC: 23 U/L (ref 2–50)
AMORPH CRY #/AREA URNS HPF: PRESENT /HPF
ANION GAP SERPL CALC-SCNC: 14 MMOL/L (ref 7–16)
APPEARANCE UR: ABNORMAL
AST SERPL-CCNC: 14 U/L (ref 12–45)
BACTERIA #/AREA URNS HPF: ABNORMAL /HPF
BASOPHILS # BLD AUTO: 0.6 % (ref 0–1.8)
BASOPHILS # BLD: 0.04 K/UL (ref 0–0.12)
BILIRUB SERPL-MCNC: 0.2 MG/DL (ref 0.1–1.5)
BILIRUB UR QL STRIP.AUTO: NEGATIVE
BUN SERPL-MCNC: 8 MG/DL (ref 8–22)
CALCIUM SERPL-MCNC: 8.7 MG/DL (ref 8.4–10.2)
CAOX CRY #/AREA URNS HPF: ABNORMAL /HPF
CHLORIDE SERPL-SCNC: 108 MMOL/L (ref 96–112)
CO2 SERPL-SCNC: 19 MMOL/L (ref 20–33)
COLOR UR: YELLOW
CREAT SERPL-MCNC: 0.78 MG/DL (ref 0.5–1.4)
EOSINOPHIL # BLD AUTO: 0.14 K/UL (ref 0–0.51)
EOSINOPHIL NFR BLD: 2.3 % (ref 0–6.9)
EPI CELLS #/AREA URNS HPF: ABNORMAL /HPF
ERYTHROCYTE [DISTWIDTH] IN BLOOD BY AUTOMATED COUNT: 44.3 FL (ref 35.9–50)
GLOBULIN SER CALC-MCNC: 2.2 G/DL (ref 1.9–3.5)
GLUCOSE SERPL-MCNC: 108 MG/DL (ref 65–99)
GLUCOSE UR STRIP.AUTO-MCNC: NEGATIVE MG/DL
HCG SERPL QL: NEGATIVE
HCT VFR BLD AUTO: 40.7 % (ref 37–47)
HGB BLD-MCNC: 13.1 G/DL (ref 12–16)
IMM GRANULOCYTES # BLD AUTO: 0.02 K/UL (ref 0–0.11)
IMM GRANULOCYTES NFR BLD AUTO: 0.3 % (ref 0–0.9)
KETONES UR STRIP.AUTO-MCNC: NEGATIVE MG/DL
LEUKOCYTE ESTERASE UR QL STRIP.AUTO: ABNORMAL
LIPASE SERPL-CCNC: 55 U/L (ref 7–58)
LYMPHOCYTES # BLD AUTO: 1.81 K/UL (ref 1–4.8)
LYMPHOCYTES NFR BLD: 29.2 % (ref 22–41)
MCH RBC QN AUTO: 28.2 PG (ref 27–33)
MCHC RBC AUTO-ENTMCNC: 32.2 G/DL (ref 33.6–35)
MCV RBC AUTO: 87.7 FL (ref 81.4–97.8)
MICRO URNS: ABNORMAL
MONOCYTES # BLD AUTO: 0.59 K/UL (ref 0–0.85)
MONOCYTES NFR BLD AUTO: 9.5 % (ref 0–13.4)
MUCOUS THREADS #/AREA URNS HPF: ABNORMAL /HPF
NEUTROPHILS # BLD AUTO: 3.6 K/UL (ref 2–7.15)
NEUTROPHILS NFR BLD: 58.1 % (ref 44–72)
NITRITE UR QL STRIP.AUTO: POSITIVE
NRBC # BLD AUTO: 0 K/UL
NRBC BLD-RTO: 0 /100 WBC
PH UR STRIP.AUTO: 5.5 [PH] (ref 5–8)
PLATELET # BLD AUTO: 178 K/UL (ref 164–446)
PMV BLD AUTO: 12 FL (ref 9–12.9)
POTASSIUM SERPL-SCNC: 3.2 MMOL/L (ref 3.6–5.5)
PROT SERPL-MCNC: 6.3 G/DL (ref 6–8.2)
PROT UR QL STRIP: 30 MG/DL
RBC # BLD AUTO: 4.64 M/UL (ref 4.2–5.4)
RBC # URNS HPF: ABNORMAL /HPF
RBC UR QL AUTO: ABNORMAL
SODIUM SERPL-SCNC: 141 MMOL/L (ref 135–145)
SP GR UR STRIP.AUTO: 1.02
WBC # BLD AUTO: 6.2 K/UL (ref 4.8–10.8)
WBC #/AREA URNS HPF: ABNORMAL /HPF
YEAST #/AREA URNS HPF: ABNORMAL /HPF

## 2020-10-08 PROCEDURE — 85025 COMPLETE CBC W/AUTO DIFF WBC: CPT

## 2020-10-08 PROCEDURE — 87077 CULTURE AEROBIC IDENTIFY: CPT

## 2020-10-08 PROCEDURE — 81001 URINALYSIS AUTO W/SCOPE: CPT

## 2020-10-08 PROCEDURE — 84703 CHORIONIC GONADOTROPIN ASSAY: CPT

## 2020-10-08 PROCEDURE — 74177 CT ABD & PELVIS W/CONTRAST: CPT

## 2020-10-08 PROCEDURE — 99284 EMERGENCY DEPT VISIT MOD MDM: CPT

## 2020-10-08 PROCEDURE — 87086 URINE CULTURE/COLONY COUNT: CPT

## 2020-10-08 PROCEDURE — 700105 HCHG RX REV CODE 258: Performed by: EMERGENCY MEDICINE

## 2020-10-08 PROCEDURE — 700117 HCHG RX CONTRAST REV CODE 255: Performed by: EMERGENCY MEDICINE

## 2020-10-08 PROCEDURE — 83690 ASSAY OF LIPASE: CPT

## 2020-10-08 PROCEDURE — 87186 SC STD MICRODIL/AGAR DIL: CPT

## 2020-10-08 PROCEDURE — 80053 COMPREHEN METABOLIC PANEL: CPT

## 2020-10-08 RX ORDER — FUROSEMIDE 80 MG
80 TABLET ORAL
Status: SHIPPED | COMMUNITY
End: 2020-10-30

## 2020-10-08 RX ORDER — SODIUM CHLORIDE, SODIUM LACTATE, POTASSIUM CHLORIDE, CALCIUM CHLORIDE 600; 310; 30; 20 MG/100ML; MG/100ML; MG/100ML; MG/100ML
1000 INJECTION, SOLUTION INTRAVENOUS ONCE
Status: COMPLETED | OUTPATIENT
Start: 2020-10-08 | End: 2020-10-08

## 2020-10-08 RX ORDER — NITROFURANTOIN MACROCRYSTALS 50 MG/1
50 CAPSULE ORAL DAILY
Status: ON HOLD | COMMUNITY
End: 2020-10-12

## 2020-10-08 RX ADMIN — IOHEXOL 100 ML: 350 INJECTION, SOLUTION INTRAVENOUS at 14:05

## 2020-10-08 RX ADMIN — SODIUM CHLORIDE, POTASSIUM CHLORIDE, SODIUM LACTATE AND CALCIUM CHLORIDE 1000 ML: 600; 310; 30; 20 INJECTION, SOLUTION INTRAVENOUS at 13:41

## 2020-10-08 ASSESSMENT — LIFESTYLE VARIABLES: DO YOU DRINK ALCOHOL: NO

## 2020-10-08 ASSESSMENT — PAIN DESCRIPTION - DESCRIPTORS: DESCRIPTORS: ACHING

## 2020-10-08 ASSESSMENT — FIBROSIS 4 INDEX: FIB4 SCORE: 0.3

## 2020-10-08 NOTE — ED TRIAGE NOTES
"Chief Complaint   Patient presents with   • Flank Pain     pt BIB [facility] c/o L flank pain for 2 days ago with decreasing urine. pt has a supra pubic catheter. samll amount (approx 100cc) hazy bnuny urine in bag.      /57   Pulse 75   Temp 36 °C (96.8 °F) (Temporal)   Resp 20   Ht 1.651 m (5' 5\")   Wt 116 kg (255 lb 11.7 oz)   SpO2 99%   BMI 42.56 kg/m²     "

## 2020-10-08 NOTE — ED NOTES
Med rec complete per med list at bedside (returned)  Allergies reviewed    Patient stated she didn't take any medications today

## 2020-10-08 NOTE — ED NOTES
Discharge instructions provided.  Pt verbalized the understanding of discharge instructions to follow up with PCP and to return to ER if condition worsens.  Pt wheeled out of ER without difficulty.

## 2020-10-08 NOTE — ED PROVIDER NOTES
"ED Provider Note    CHIEF COMPLAINT  Chief Complaint   Patient presents with   • Flank Pain     pt BIB [facility] c/o L flank pain for 2 days ago with decreasing urine. pt has a supra pubic catheter. samll amount (approx 100cc) hazy bunny urine in bag.        HPI  Kristin Balderrama is a 29 y.o. female with a history of transverse myelitis and incontinence that resulted in a suprapubic catheter who presents with decreased urine output over the past 2 days.  She states that since last night she has had only 100 cc of urine output.  With this she has developed some lower abdominal pain and left flank pain.  She has tried to contact her urologist Dr. Rosenbaum and her nephrologist however has been unable to get an appointment with them.  She notes that she has pruritus all over her body as well.  No vomiting.  No chest pain or trouble breathing.  No diarrhea.    Patient has a history of suprapubic catheter and has had prior diagnosis of ESBL.    Patient states that she lives at home currently.  She has \"good days and bad days\" and can occasionally ambulate secondary to her prior diagnosis of transverse myelitis.    I reviewed the patient's prior medical record.  Last urine culture was from 9/21/2020.  Found to have Klebsiella oxytocin ESBL.  According to sensitivities, it is sensitive to Cipro T10, Cipro, gentamicin, levofloxacin, tobramycin.    REVIEW OF SYSTEMS  See HPI for further details. All other systems are negative.     PAST MEDICAL HISTORY   has a past medical history of Abdominal pain, Anginal syndrome, Apnea, sleep, Arrhythmia, Arthritis, ASTHMA, Atrial fibrillation (HCC), Back pain, Borderline personality disorder ([facility]), Breath shortness, Bronchitis, Cardiac arrhythmia, Chickenpox, Chronic UTI (9/18/2010), Cough, Daytime sleepiness, Depression, Diabetes ([facility]), Diarrhea, Disorder of thyroid, Fall, Fatigue, Frequent headaches, Gasping for breath, Gynecological disorder, Headache(784.0), Heart burn, History of " falling, Hypertension, Indigestion, Migraine, 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[facility]), 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    SOCIAL HISTORY  Social History     Tobacco Use   • Smoking status: Never Smoker   • Smokeless tobacco: Never Used   Substance and Sexual Activity   • Alcohol use: No     Alcohol/week: 0.0 oz     Frequency: Never     Binge frequency: Never   • Drug use: Not Currently     Frequency: 7.0 times per week     Types: Marijuana   • Sexual activity: Not Currently     Birth control/protection: Pill       SURGICAL HISTORY   has a past surgical history that includes neuro dest facet l/s w/ig sngl (4/21/2015); recovery (1/27/2016); del[place] by laparoscopy (8/29/2010); lumbar fusion anterior (8/21/2012); other cardiac surgery (1/2016); tonsillectomy; bowel stimulator insertion (7/13/2016); gastroscopy with balloon dilatation (N/A, 1/4/2017); muscle biopsy (Right, 1/26/2017); other abdominal surgery; and laminotomy.    CURRENT MEDICATIONS  Home Medications     Reviewed by Ulisses Alatorre (Pharmacy Tech) on 10/08/20 at 1128  Med List Status: Complete   Medication Last Dose Status   acetaZOLAMIDE SR (DIAMOX) 500 MG CAPSULE SR 12 HR 10/7/2020 Active   albuterol 108 (90 Base) MCG/ACT Aero Soln inhalation aerosol PRN Active   aspirin EC (ECOTRIN) 81 MG Tablet Delayed Response 10/7/2020 Active   busPIRone (BUSPAR) 10 MG Tab tablet 10/7/2020 Active   calcium carbonate (TUMS EX) 750 MG chewable tablet Not Taking Active   Dulaglutide (TRULICITY) 1.5 MG/0.5ML Solution Pen-injector 10/2/2020 Active   etonogestrel (NEXPLANON) 68 MG  "Implant implant CONT Active   fluoxetine (PROZAC) 40 MG capsule 10/7/2020 Active   furosemide (LASIX) 80 MG Tab 10/7/2020 Active   gabapentin (NEURONTIN) 300 MG Cap 10/7/2020 Active   insulin glargine (LANTUS) 100 UNIT/ML Solution 10/7/2020 Active   ivabradine (CORLANOR) 7.5 MG Tab tablet 10/7/2020 Active   levothyroxine (SYNTHROID) 75 MCG Tab 10/7/2020 Active   lidocaine (XYLOCAINE) 2 % Solution Not Taking Active   Melatonin 10 MG Tab 10/7/2020 Active   methocarbamol (ROBAXIN) 750 MG Tab 10/7/2020 Active   Mirabegron ER (MYRBETRIQ) 50 MG TABLET SR 24 HR 10/7/2020 Active   mycophenolate (CELLCEPT) 500 MG tablet 10/7/2020 Active   nitrofurantoin (MACRODANTIN) 50 MG Cap 10/7/2020 Active   omeprazole (PRILOSEC) 20 MG delayed-release capsule 10/7/2020 Active   ondansetron (ZOFRAN ODT) 4 MG TABLET DISPERSIBLE 10/7/2020 Active   OXcarbazepine (TRILEPTAL) 150 MG Tab 10/7/2020 Active   oxybutynin (DITROPAN) 5 MG Tab 10/7/2020 Active   potassium chloride SA (KDUR) 20 MEQ Tab CR 10/7/2020 Active   predniSONE (DELTASONE) 10 MG Tab 10/7/2020 Active   pregabalin (LYRICA) 300 MG capsule 10/7/2020 Active   sodium bicarbonate (SODIUM BICARBONATE) 650 MG Tab 10/7/2020 Active   topiramate (TOPAMAX) 50 MG tablet 10/7/2020 Active   traZODone (DESYREL) 100 MG Tab 10/7/2020 Active   vitamin D, Ergocalciferol, (DRISDOL) 1.25 MG (03628 UT) Cap capsule UNK Active   ziprasidone (GEODON) 40 MG Cap 10/7/2020 Active                ALLERGIES  Allergies   Allergen Reactions   • Depakote [Divalproex Sodium] Unspecified     Muscle spasms/muscle pain and weakness     • Amitriptyline Unspecified     Headaches     • Aripiprazole [Abilify] Unspecified     Headaches/muscle twitching     • Clindamycin Nausea     Even with food     • Flagyl [Metronidazole Hcl] Unspecified     \"eye problems\"     • Flomax [Tamsulosin Hydrochloride] Swelling   • Levaquin Unspecified     Severe muscle cramps in legs  RXN=unknown   • Metformin Unspecified     Increased lactic " "acid      • Tape Rash     Tears skin off  coban with Tegaderm tape ok intermittently  RXN=ongoing   • Vancomycin Itching     Pt becomes flushed in face and chest.   RXN=7/10/16   • Wound Dressing Adhesive Hives     By pt report   • Ciprofloxacin    • Hydromorphone Hcl      Pt states she feels loopy   • Keflex Rash     Pt states she gets a rash with this medication  Tolerates ceftriaxone   • Levofloxacin Unspecified     Leg muscle cramps   • Metronidazole Rash     .   • Penicillins    • Tamsulosin    • Valproic Acid Rash     .       PHYSICAL EXAM  VITAL SIGNS: /57   Pulse 75   Temp 36 °C (96.8 °F) (Temporal)   Resp 20   Ht 1.651 m (5' 5\")   Wt 116 kg (255 lb 11.7 oz)   SpO2 99%   BMI 42.56 kg/m²   Pulse ox interpretation: I interpret this pulse ox as normal.  Constitutional: Alert in no apparent distress.  HENT: No signs of trauma, Bilateral external ears normal, Nose normal.   Eyes: Pupils are equal and reactive, Conjunctiva normal, Non-icteric.   Neck: Normal range of motion, No tenderness, Supple, No stridor.   Cardiovascular: Regular rate and rhythm.   Thorax & Lungs: Normal breath sounds, No respiratory distress, No wheezing, No chest tenderness.   Abdomen: Bowel sounds normal, Soft, suprapubic catheter in place.  Mild suprapubic tenderness, No masses, No pulsatile masses. No peritoneal signs.  Skin: Warm, Dry, No erythema, No rash, specifically no rash on the abdomen or back.   Back: No bony tenderness, left CVA tenderness.   Extremities: Intact distal pulses, No edema, No tenderness, No cyanosis  Neurologic: Alert, No focal deficits noted.       DIAGNOSTIC STUDIES / PROCEDURES    LABS  Labs Reviewed   URINALYSIS,CULTURE IF INDICATED - Abnormal; Notable for the following components:       Result Value    Character Cloudy (*)     Protein 30 (*)     Nitrite Positive (*)     Leukocyte Esterase Small (*)     Occult Blood Large (*)     All other components within normal limits   URINE MICROSCOPIC (W/UA) " - Abnormal; Notable for the following components:    -150 (*)     RBC 20-50 (*)     Bacteria Many (*)     Yeast Cells Rare (*)     All other components within normal limits   CBC WITH DIFFERENTIAL - Abnormal; Notable for the following components:    MCHC 32.2 (*)     All other components within normal limits   COMP METABOLIC PANEL - Abnormal; Notable for the following components:    Potassium 3.2 (*)     Co2 19 (*)     Glucose 108 (*)     All other components within normal limits   LIPASE   URINE CULTURE(NEW)   ESTIMATED GFR   HCG QUAL SERUM         RADIOLOGY  CT-ABDOMEN-PELVIS WITH   Final Result      1.  No acute intra-abdominal findings.      2.  Splenomegaly      3.  No significant change in 3.8 cm right ovarian cyst since 3/1/2020            COURSE & MEDICAL DECISION MAKING    Medications   lactated ringers infusion (BOLUS) (0 mL Intravenous Stopped 10/8/20 1519)   iohexol (OMNIPAQUE) 350 mg/mL (100 mL Intravenous Given 10/8/20 1405)       Pertinent Labs & Imaging studies reviewed. (See chart for details)  29 y.o. female presenting with decreased urine output and reports of left flank pain and abdominal pain for the past 2 days.  Has a suprapubic catheter in place and notes that only 100 cc has been produced since last night.  She believes that she has decreased urine output.    Bedside bladder scan was performed showing only minimal urine in the bladder.  Does not appear to be an obstructive process causing decreased urine output.  Laboratory studies were performed.  No leukocytosis.  No renal failure.    Due to the minimal urine output, she was given IV fluid hydration to see if this should help.  This did result in further urine output.    Patient does not appear to be septic clinically.  No fever.  Patient is resting comfortably.  No vomiting.  No tachycardia.  Urinalysis looks like it could be infected however the patient has been treated several times for recurrent urinary tract infections and has  "an indwelling suprapubic catheter.  Due to minimal objective findings on today's work-up, I believe that the urinalysis is more indicative of colonization rather than true urinary tract infection.    CT abdomen pelvis was ordered for further evaluation of the patient's reported discomfort.  No acute abnormalities were identified.  No renal abscess or nephrolithiasis.  No obvious signs of pyelonephritis or cystitis.  No evidence of obstructive urinary process.  I did review the patient's prior urine culture showing multiple antibiotic resistance and presence of ESBL.  I do not believe the patient would benefit from further antibiotic treatment in the setting true infection can be confirmed.  Patient was encouraged to adequately hydrate and to monitor urine output.  Follow-up with primary care physician for further management.  All results were reviewed with the patient.  She reports understanding and is agreeable with the plan of care for discharge.  She is resting comfortably and in no distress.  No signs of peritonitis.  Continues to make urine output that is more clear after fluid bolus compared to when she first arrived.  Given the minimal residual urine in the bladder and no evidence of renal failure or obstructive uropathy, I believe that the patient's decreased urine output is likely related to dehydration.    HYDRATION: Based on the patient's presentation of Dehydration the patient was given IV fluids. IV Hydration was used because oral hydration was not as rapid as required. Upon recheck following hydration, the patient was improved.    The patient was instructed to follow-up with primary care physician for further management.  To return immediately for any worsening symptoms or development of any other concerning signs or symptoms. The patient verbalizes understanding in their own words.    /69   Pulse 63   Temp 36 °C (96.8 °F) (Temporal)   Resp 20   Ht 1.651 m (5' 5\")   Wt 116 kg (255 lb 11.7 " oz)   SpO2 96%   BMI 42.56 kg/m²     The patient was referred to primary care where they will receive further BP management.      Torres Brody M.D.  [address] [place] [address] [address]  [place] [id number] [phone]    Schedule an appointment as soon as possible for a visit       [facility], Emergency Dept  [address]  [place] [id number] [phone]    As needed, If symptoms worsen      FINAL IMPRESSION  1. Generalized abdominal pain    2. Suprapubic catheter (HCC)            Electronically signed by: Laron Vela M.D., 10/8/2020 10:57 AM

## 2020-10-08 NOTE — ED NOTES
ERP at bedside. Pt agrees with plan of care discussed by ERP. AIDET acknowledged with patient. Urine to lab. Bladder scan 12 cc urine in bladder.  Gurney in low position, side rail up for pt safety. Call light within reach. Will continue to monitor.

## 2020-10-09 ENCOUNTER — PATIENT OUTREACH (OUTPATIENT)
Dept: HEALTH INFORMATION MANAGEMENT | Facility: OTHER | Age: 30
End: 2020-10-09

## 2020-10-09 ENCOUNTER — HOSPITAL ENCOUNTER (OUTPATIENT)
Dept: LAB | Facility: MEDICAL CENTER | Age: 30
End: 2020-10-09
Attending: NURSE PRACTITIONER
Payer: MEDICARE

## 2020-10-09 LAB
ALBUMIN SERPL BCP-MCNC: 4.1 G/DL (ref 3.2–4.9)
ALBUMIN/GLOB SERPL: 2.4 G/DL
ALP SERPL-CCNC: 82 U/L (ref 30–99)
ALT SERPL-CCNC: 23 U/L (ref 2–50)
ANION GAP SERPL CALC-SCNC: 13 MMOL/L (ref 7–16)
APPEARANCE UR: ABNORMAL
AST SERPL-CCNC: 12 U/L (ref 12–45)
BACTERIA #/AREA URNS HPF: ABNORMAL /HPF
BASOPHILS # BLD AUTO: 0.6 % (ref 0–1.8)
BASOPHILS # BLD: 0.03 K/UL (ref 0–0.12)
BILIRUB SERPL-MCNC: 0.2 MG/DL (ref 0.1–1.5)
BILIRUB UR QL STRIP.AUTO: NEGATIVE
BUN SERPL-MCNC: 8 MG/DL (ref 8–22)
CAOX CRY #/AREA URNS HPF: ABNORMAL /HPF
CHLORIDE SERPL-SCNC: 114 MMOL/L (ref 96–112)
CO2 SERPL-SCNC: 16 MMOL/L (ref 20–33)
COLOR UR: YELLOW
CREAT SERPL-MCNC: 0.75 MG/DL (ref 0.5–1.4)
EOSINOPHIL # BLD AUTO: 0.15 K/UL (ref 0–0.51)
EOSINOPHIL NFR BLD: 3 % (ref 0–6.9)
EPI CELLS #/AREA URNS HPF: ABNORMAL /HPF
ERYTHROCYTE [DISTWIDTH] IN BLOOD BY AUTOMATED COUNT: 46.2 FL (ref 35.9–50)
EST. AVERAGE GLUCOSE BLD GHB EST-MCNC: 100 MG/DL
GLOBULIN SER CALC-MCNC: 1.7 G/DL (ref 1.9–3.5)
GLUCOSE SERPL-MCNC: 107 MG/DL (ref 65–99)
GLUCOSE UR STRIP.AUTO-MCNC: NEGATIVE MG/DL
HBA1C MFR BLD: 5.1 % (ref 0–5.6)
HCT VFR BLD AUTO: 42.8 % (ref 37–47)
HGB BLD-MCNC: 13.4 G/DL (ref 12–16)
IMM GRANULOCYTES # BLD AUTO: 0.02 K/UL (ref 0–0.11)
IMM GRANULOCYTES NFR BLD AUTO: 0.4 % (ref 0–0.9)
KETONES UR STRIP.AUTO-MCNC: NEGATIVE MG/DL
LEUKOCYTE ESTERASE UR QL STRIP.AUTO: ABNORMAL
LYMPHOCYTES # BLD AUTO: 1.51 K/UL (ref 1–4.8)
LYMPHOCYTES NFR BLD: 30.3 % (ref 22–41)
MAGNESIUM SERPL-MCNC: 2 MG/DL (ref 1.5–2.5)
MCH RBC QN AUTO: 28 PG (ref 27–33)
MCHC RBC AUTO-ENTMCNC: 31.3 G/DL (ref 33.6–35)
MCV RBC AUTO: 89.5 FL (ref 81.4–97.8)
MICRO URNS: ABNORMAL
MONOCYTES # BLD AUTO: 0.36 K/UL (ref 0–0.85)
MONOCYTES NFR BLD AUTO: 7.2 % (ref 0–13.4)
NEUTROPHILS # BLD AUTO: 2.92 K/UL (ref 2–7.15)
NEUTROPHILS NFR BLD: 58.5 % (ref 44–72)
NITRITE UR QL STRIP.AUTO: POSITIVE
NRBC # BLD AUTO: 0 K/UL
NRBC BLD-RTO: 0 /100 WBC
PH UR STRIP.AUTO: 7.5 [PH] (ref 5–8)
PHOSPHATE SERPL-MCNC: 2.6 MG/DL (ref 2.5–4.5)
PLATELET # BLD AUTO: 206 K/UL (ref 164–446)
PMV BLD AUTO: 12.6 FL (ref 9–12.9)
POTASSIUM SERPL-SCNC: 3.3 MMOL/L (ref 3.6–5.5)
PROT SERPL-MCNC: 5.8 G/DL (ref 6–8.2)
PROT UR QL STRIP: 30 MG/DL
RBC # BLD AUTO: 4.78 M/UL (ref 4.2–5.4)
RBC # URNS HPF: ABNORMAL /HPF
RBC UR QL AUTO: ABNORMAL
SODIUM SERPL-SCNC: 143 MMOL/L (ref 135–145)
SP GR UR STRIP.AUTO: 1.02
URATE SERPL-MCNC: 5.6 MG/DL (ref 1.9–8.2)
UROBILINOGEN UR STRIP.AUTO-MCNC: 0.2 MG/DL
WBC # BLD AUTO: 5 K/UL (ref 4.8–10.8)
WBC #/AREA URNS HPF: ABNORMAL /HPF

## 2020-10-09 PROCEDURE — 83735 ASSAY OF MAGNESIUM: CPT

## 2020-10-09 PROCEDURE — 84100 ASSAY OF PHOSPHORUS: CPT

## 2020-10-09 PROCEDURE — 84550 ASSAY OF BLOOD/URIC ACID: CPT

## 2020-10-09 PROCEDURE — 36415 COLL VENOUS BLD VENIPUNCTURE: CPT

## 2020-10-09 PROCEDURE — 80053 COMPREHEN METABOLIC PANEL: CPT

## 2020-10-09 PROCEDURE — 85025 COMPLETE CBC W/AUTO DIFF WBC: CPT

## 2020-10-09 PROCEDURE — 81001 URINALYSIS AUTO W/SCOPE: CPT

## 2020-10-09 PROCEDURE — 87086 URINE CULTURE/COLONY COUNT: CPT

## 2020-10-09 PROCEDURE — 82306 VITAMIN D 25 HYDROXY: CPT

## 2020-10-09 PROCEDURE — 87186 SC STD MICRODIL/AGAR DIL: CPT

## 2020-10-09 PROCEDURE — 83970 ASSAY OF PARATHORMONE: CPT

## 2020-10-09 PROCEDURE — 87077 CULTURE AEROBIC IDENTIFY: CPT

## 2020-10-09 PROCEDURE — 83036 HEMOGLOBIN GLYCOSYLATED A1C: CPT | Mod: GA

## 2020-10-10 LAB
25(OH)D3 SERPL-MCNC: 15 NG/ML (ref 30–100)
BACTERIA UR CULT: ABNORMAL
BACTERIA UR CULT: ABNORMAL
CALCIUM SERPL-MCNC: 9.2 MG/DL (ref 8.5–10.5)
CREAT UR-MCNC: 169.76 MG/DL
PROT UR-MCNC: 36 MG/DL (ref 0–15)
PROT/CREAT UR: 212 MG/G (ref 10–107)
PTH-INTACT SERPL-MCNC: 37.3 PG/ML (ref 14–72)
SIGNIFICANT IND 70042: ABNORMAL
SITE SITE: ABNORMAL
SOURCE SOURCE: ABNORMAL

## 2020-10-11 ENCOUNTER — HOSPITAL ENCOUNTER (INPATIENT)
Facility: MEDICAL CENTER | Age: 30
LOS: 1 days | DRG: 699 | End: 2020-10-12
Attending: EMERGENCY MEDICINE | Admitting: INTERNAL MEDICINE
Payer: MEDICARE

## 2020-10-11 DIAGNOSIS — T83.511A URINARY TRACT INFECTION ASSOCIATED WITH CATHETERIZATION OF URINARY TRACT, UNSPECIFIED INDWELLING URINARY CATHETER TYPE, INITIAL ENCOUNTER (HCC): ICD-10-CM

## 2020-10-11 DIAGNOSIS — N39.0 URINARY TRACT INFECTION ASSOCIATED WITH CATHETERIZATION OF URINARY TRACT, UNSPECIFIED INDWELLING URINARY CATHETER TYPE, INITIAL ENCOUNTER (HCC): ICD-10-CM

## 2020-10-11 DIAGNOSIS — A49.9 ESBL (EXTENDED SPECTRUM BETA-LACTAMASE) PRODUCING BACTERIA INFECTION: ICD-10-CM

## 2020-10-11 DIAGNOSIS — Z16.12 ESBL (EXTENDED SPECTRUM BETA-LACTAMASE) PRODUCING BACTERIA INFECTION: ICD-10-CM

## 2020-10-11 DIAGNOSIS — G37.3 TRANSVERSE MYELITIS (HCC): ICD-10-CM

## 2020-10-11 DIAGNOSIS — N12 PYELONEPHRITIS: ICD-10-CM

## 2020-10-11 LAB
ALBUMIN SERPL BCP-MCNC: 4.1 G/DL (ref 3.2–4.9)
ALBUMIN/GLOB SERPL: 2.1 G/DL
ALP SERPL-CCNC: 81 U/L (ref 30–99)
ALT SERPL-CCNC: 19 U/L (ref 2–50)
ANION GAP SERPL CALC-SCNC: 10 MMOL/L (ref 7–16)
AST SERPL-CCNC: 9 U/L (ref 12–45)
BASOPHILS # BLD AUTO: 0.5 % (ref 0–1.8)
BASOPHILS # BLD: 0.03 K/UL (ref 0–0.12)
BILIRUB SERPL-MCNC: <0.2 MG/DL (ref 0.1–1.5)
BUN SERPL-MCNC: 8 MG/DL (ref 8–22)
CALCIUM SERPL-MCNC: 8.7 MG/DL (ref 8.4–10.2)
CHLORIDE SERPL-SCNC: 121 MMOL/L (ref 96–112)
CO2 SERPL-SCNC: 13 MMOL/L (ref 20–33)
COVID ORDER STATUS COVID19: NORMAL
CREAT SERPL-MCNC: 0.77 MG/DL (ref 0.5–1.4)
EOSINOPHIL # BLD AUTO: 0.08 K/UL (ref 0–0.51)
EOSINOPHIL NFR BLD: 1.3 % (ref 0–6.9)
ERYTHROCYTE [DISTWIDTH] IN BLOOD BY AUTOMATED COUNT: 46.4 FL (ref 35.9–50)
GLOBULIN SER CALC-MCNC: 2 G/DL (ref 1.9–3.5)
GLUCOSE BLD-MCNC: 105 MG/DL (ref 65–99)
GLUCOSE SERPL-MCNC: 190 MG/DL (ref 65–99)
HCG SERPL QL: NEGATIVE
HCT VFR BLD AUTO: 40.8 % (ref 37–47)
HGB BLD-MCNC: 12.7 G/DL (ref 12–16)
IMM GRANULOCYTES # BLD AUTO: 0.02 K/UL (ref 0–0.11)
IMM GRANULOCYTES NFR BLD AUTO: 0.3 % (ref 0–0.9)
LACTATE BLD-SCNC: 1.7 MMOL/L (ref 0.5–2)
LIPASE SERPL-CCNC: 47 U/L (ref 7–58)
LYMPHOCYTES # BLD AUTO: 0.86 K/UL (ref 1–4.8)
LYMPHOCYTES NFR BLD: 14.4 % (ref 22–41)
MAGNESIUM SERPL-MCNC: 2 MG/DL (ref 1.5–2.5)
MCH RBC QN AUTO: 28.3 PG (ref 27–33)
MCHC RBC AUTO-ENTMCNC: 31.1 G/DL (ref 33.6–35)
MCV RBC AUTO: 91.1 FL (ref 81.4–97.8)
MONOCYTES # BLD AUTO: 0.3 K/UL (ref 0–0.85)
MONOCYTES NFR BLD AUTO: 5 % (ref 0–13.4)
NEUTROPHILS # BLD AUTO: 4.7 K/UL (ref 2–7.15)
NEUTROPHILS NFR BLD: 78.5 % (ref 44–72)
NRBC # BLD AUTO: 0 K/UL
NRBC BLD-RTO: 0 /100 WBC
PLATELET # BLD AUTO: 177 K/UL (ref 164–446)
PMV BLD AUTO: 12.3 FL (ref 9–12.9)
POTASSIUM SERPL-SCNC: 4.1 MMOL/L (ref 3.6–5.5)
PROT SERPL-MCNC: 6.1 G/DL (ref 6–8.2)
RBC # BLD AUTO: 4.48 M/UL (ref 4.2–5.4)
SODIUM SERPL-SCNC: 144 MMOL/L (ref 135–145)
WBC # BLD AUTO: 6 K/UL (ref 4.8–10.8)

## 2020-10-11 PROCEDURE — 96365 THER/PROPH/DIAG IV INF INIT: CPT

## 2020-10-11 PROCEDURE — 700102 HCHG RX REV CODE 250 W/ 637 OVERRIDE(OP): Performed by: INTERNAL MEDICINE

## 2020-10-11 PROCEDURE — 96375 TX/PRO/DX INJ NEW DRUG ADDON: CPT

## 2020-10-11 PROCEDURE — 51702 INSERT TEMP BLADDER CATH: CPT

## 2020-10-11 PROCEDURE — 770006 HCHG ROOM/CARE - MED/SURG/GYN SEMI*

## 2020-10-11 PROCEDURE — 87040 BLOOD CULTURE FOR BACTERIA: CPT

## 2020-10-11 PROCEDURE — 700105 HCHG RX REV CODE 258: Performed by: INTERNAL MEDICINE

## 2020-10-11 PROCEDURE — U0003 INFECTIOUS AGENT DETECTION BY NUCLEIC ACID (DNA OR RNA); SEVERE ACUTE RESPIRATORY SYNDROME CORONAVIRUS 2 (SARS-COV-2) (CORONAVIRUS DISEASE [COVID-19]), AMPLIFIED PROBE TECHNIQUE, MAKING USE OF HIGH THROUGHPUT TECHNOLOGIES AS DESCRIBED BY CMS-2020-01-R: HCPCS

## 2020-10-11 PROCEDURE — 80053 COMPREHEN METABOLIC PANEL: CPT

## 2020-10-11 PROCEDURE — 700111 HCHG RX REV CODE 636 W/ 250 OVERRIDE (IP): Performed by: INTERNAL MEDICINE

## 2020-10-11 PROCEDURE — 99285 EMERGENCY DEPT VISIT HI MDM: CPT

## 2020-10-11 PROCEDURE — 99223 1ST HOSP IP/OBS HIGH 75: CPT | Mod: AI | Performed by: INTERNAL MEDICINE

## 2020-10-11 PROCEDURE — 303105 HCHG CATHETER EXTRA

## 2020-10-11 PROCEDURE — 83605 ASSAY OF LACTIC ACID: CPT

## 2020-10-11 PROCEDURE — 700111 HCHG RX REV CODE 636 W/ 250 OVERRIDE (IP): Performed by: EMERGENCY MEDICINE

## 2020-10-11 PROCEDURE — 700105 HCHG RX REV CODE 258: Performed by: EMERGENCY MEDICINE

## 2020-10-11 PROCEDURE — 84703 CHORIONIC GONADOTROPIN ASSAY: CPT

## 2020-10-11 PROCEDURE — 36415 COLL VENOUS BLD VENIPUNCTURE: CPT

## 2020-10-11 PROCEDURE — C9803 HOPD COVID-19 SPEC COLLECT: HCPCS | Performed by: INTERNAL MEDICINE

## 2020-10-11 PROCEDURE — 83690 ASSAY OF LIPASE: CPT

## 2020-10-11 PROCEDURE — 83735 ASSAY OF MAGNESIUM: CPT

## 2020-10-11 PROCEDURE — 82962 GLUCOSE BLOOD TEST: CPT

## 2020-10-11 PROCEDURE — 700111 HCHG RX REV CODE 636 W/ 250 OVERRIDE (IP)

## 2020-10-11 PROCEDURE — 700102 HCHG RX REV CODE 250 W/ 637 OVERRIDE(OP)

## 2020-10-11 PROCEDURE — 85025 COMPLETE CBC W/AUTO DIFF WBC: CPT

## 2020-10-11 PROCEDURE — A9270 NON-COVERED ITEM OR SERVICE: HCPCS | Performed by: INTERNAL MEDICINE

## 2020-10-11 RX ORDER — ONDANSETRON 2 MG/ML
4 INJECTION INTRAMUSCULAR; INTRAVENOUS EVERY 4 HOURS PRN
Status: DISCONTINUED | OUTPATIENT
Start: 2020-10-11 | End: 2020-10-12 | Stop reason: HOSPADM

## 2020-10-11 RX ORDER — CLONIDINE HYDROCHLORIDE 0.1 MG/1
0.1 TABLET ORAL EVERY 6 HOURS PRN
Status: DISCONTINUED | OUTPATIENT
Start: 2020-10-11 | End: 2020-10-12 | Stop reason: HOSPADM

## 2020-10-11 RX ORDER — OMEPRAZOLE 20 MG/1
20 CAPSULE, DELAYED RELEASE ORAL DAILY
Status: DISCONTINUED | OUTPATIENT
Start: 2020-10-12 | End: 2020-10-12 | Stop reason: HOSPADM

## 2020-10-11 RX ORDER — PREGABALIN 100 MG/1
300 CAPSULE ORAL 2 TIMES DAILY
Status: DISCONTINUED | OUTPATIENT
Start: 2020-10-11 | End: 2020-10-12 | Stop reason: HOSPADM

## 2020-10-11 RX ORDER — OXYCODONE HYDROCHLORIDE 5 MG/1
5 TABLET ORAL
Status: DISCONTINUED | OUTPATIENT
Start: 2020-10-11 | End: 2020-10-12 | Stop reason: HOSPADM

## 2020-10-11 RX ORDER — TRAZODONE HYDROCHLORIDE 50 MG/1
100 TABLET ORAL
Status: DISCONTINUED | OUTPATIENT
Start: 2020-10-11 | End: 2020-10-12 | Stop reason: HOSPADM

## 2020-10-11 RX ORDER — OXCARBAZEPINE 300 MG/1
150 TABLET, FILM COATED ORAL 2 TIMES DAILY
Status: DISCONTINUED | OUTPATIENT
Start: 2020-10-11 | End: 2020-10-12 | Stop reason: HOSPADM

## 2020-10-11 RX ORDER — ENALAPRILAT 1.25 MG/ML
1.25 INJECTION INTRAVENOUS EVERY 6 HOURS PRN
Status: DISCONTINUED | OUTPATIENT
Start: 2020-10-11 | End: 2020-10-12 | Stop reason: HOSPADM

## 2020-10-11 RX ORDER — OXYBUTYNIN CHLORIDE 5 MG/1
5 TABLET ORAL 3 TIMES DAILY
Status: DISCONTINUED | OUTPATIENT
Start: 2020-10-11 | End: 2020-10-12 | Stop reason: HOSPADM

## 2020-10-11 RX ORDER — AMOXICILLIN 250 MG
2 CAPSULE ORAL 2 TIMES DAILY
Status: DISCONTINUED | OUTPATIENT
Start: 2020-10-11 | End: 2020-10-12 | Stop reason: HOSPADM

## 2020-10-11 RX ORDER — PREDNISONE 10 MG/1
10 TABLET ORAL DAILY
Status: DISCONTINUED | OUTPATIENT
Start: 2020-10-12 | End: 2020-10-12 | Stop reason: HOSPADM

## 2020-10-11 RX ORDER — TOPIRAMATE 25 MG/1
50 TABLET ORAL 2 TIMES DAILY
Status: DISCONTINUED | OUTPATIENT
Start: 2020-10-11 | End: 2020-10-12 | Stop reason: HOSPADM

## 2020-10-11 RX ORDER — INSULIN GLARGINE 100 [IU]/ML
INJECTION, SOLUTION SUBCUTANEOUS
Status: ACTIVE
Start: 2020-10-11 | End: 2020-10-12

## 2020-10-11 RX ORDER — ACETAZOLAMIDE 250 MG/1
1000 TABLET ORAL EVERY MORNING
Status: DISCONTINUED | OUTPATIENT
Start: 2020-10-12 | End: 2020-10-11

## 2020-10-11 RX ORDER — MEROPENEM 1 G/1
INJECTION, POWDER, FOR SOLUTION INTRAVENOUS
Status: ACTIVE
Start: 2020-10-11 | End: 2020-10-12

## 2020-10-11 RX ORDER — MORPHINE SULFATE 4 MG/ML
4 INJECTION, SOLUTION INTRAMUSCULAR; INTRAVENOUS ONCE
Status: COMPLETED | OUTPATIENT
Start: 2020-10-11 | End: 2020-10-11

## 2020-10-11 RX ORDER — INSULIN GLARGINE 100 [IU]/ML
12 INJECTION, SOLUTION SUBCUTANEOUS EVERY EVENING
Status: DISCONTINUED | OUTPATIENT
Start: 2020-10-11 | End: 2020-10-12 | Stop reason: HOSPADM

## 2020-10-11 RX ORDER — LABETALOL HYDROCHLORIDE 5 MG/ML
10 INJECTION, SOLUTION INTRAVENOUS EVERY 4 HOURS PRN
Status: DISCONTINUED | OUTPATIENT
Start: 2020-10-11 | End: 2020-10-12 | Stop reason: HOSPADM

## 2020-10-11 RX ORDER — POTASSIUM CHLORIDE 20 MEQ/1
20 TABLET, EXTENDED RELEASE ORAL 2 TIMES DAILY
Status: DISCONTINUED | OUTPATIENT
Start: 2020-10-11 | End: 2020-10-12 | Stop reason: HOSPADM

## 2020-10-11 RX ORDER — MORPHINE SULFATE 4 MG/ML
4 INJECTION, SOLUTION INTRAMUSCULAR; INTRAVENOUS
Status: DISCONTINUED | OUTPATIENT
Start: 2020-10-11 | End: 2020-10-12 | Stop reason: HOSPADM

## 2020-10-11 RX ORDER — MYCOPHENOLATE MOFETIL 250 MG/1
1000 CAPSULE ORAL 2 TIMES DAILY
Status: DISCONTINUED | OUTPATIENT
Start: 2020-10-11 | End: 2020-10-12 | Stop reason: HOSPADM

## 2020-10-11 RX ORDER — BUSPIRONE HYDROCHLORIDE 5 MG/1
10 TABLET ORAL 2 TIMES DAILY
Status: DISCONTINUED | OUTPATIENT
Start: 2020-10-11 | End: 2020-10-12 | Stop reason: HOSPADM

## 2020-10-11 RX ORDER — LEVOTHYROXINE SODIUM 0.05 MG/1
75 TABLET ORAL
Status: DISCONTINUED | OUTPATIENT
Start: 2020-10-12 | End: 2020-10-12 | Stop reason: HOSPADM

## 2020-10-11 RX ORDER — ALBUTEROL SULFATE 90 UG/1
2 AEROSOL, METERED RESPIRATORY (INHALATION) EVERY 6 HOURS PRN
Status: DISCONTINUED | OUTPATIENT
Start: 2020-10-11 | End: 2020-10-12 | Stop reason: HOSPADM

## 2020-10-11 RX ORDER — ZIPRASIDONE HYDROCHLORIDE 20 MG/1
40 CAPSULE ORAL 2 TIMES DAILY
Status: DISCONTINUED | OUTPATIENT
Start: 2020-10-11 | End: 2020-10-12 | Stop reason: HOSPADM

## 2020-10-11 RX ORDER — SODIUM CHLORIDE 9 MG/ML
1000 INJECTION, SOLUTION INTRAVENOUS ONCE
Status: COMPLETED | OUTPATIENT
Start: 2020-10-11 | End: 2020-10-11

## 2020-10-11 RX ORDER — SODIUM CHLORIDE 9 MG/ML
INJECTION, SOLUTION INTRAVENOUS
Status: DISPENSED
Start: 2020-10-11 | End: 2020-10-12

## 2020-10-11 RX ORDER — ACETAZOLAMIDE 250 MG/1
1500 TABLET ORAL EVERY MORNING
Status: DISCONTINUED | OUTPATIENT
Start: 2020-10-12 | End: 2020-10-11

## 2020-10-11 RX ORDER — ONDANSETRON 2 MG/ML
4 INJECTION INTRAMUSCULAR; INTRAVENOUS ONCE
Status: COMPLETED | OUTPATIENT
Start: 2020-10-11 | End: 2020-10-11

## 2020-10-11 RX ORDER — ACETAZOLAMIDE 250 MG/1
1500 TABLET ORAL EVERY MORNING
Status: DISCONTINUED | OUTPATIENT
Start: 2020-10-12 | End: 2020-10-12 | Stop reason: HOSPADM

## 2020-10-11 RX ORDER — BISACODYL 10 MG
10 SUPPOSITORY, RECTAL RECTAL
Status: DISCONTINUED | OUTPATIENT
Start: 2020-10-11 | End: 2020-10-12 | Stop reason: HOSPADM

## 2020-10-11 RX ORDER — DEXTROSE MONOHYDRATE 25 G/50ML
50 INJECTION, SOLUTION INTRAVENOUS
Status: DISCONTINUED | OUTPATIENT
Start: 2020-10-11 | End: 2020-10-12 | Stop reason: HOSPADM

## 2020-10-11 RX ORDER — POLYETHYLENE GLYCOL 3350 17 G/17G
1 POWDER, FOR SOLUTION ORAL
Status: DISCONTINUED | OUTPATIENT
Start: 2020-10-11 | End: 2020-10-12 | Stop reason: HOSPADM

## 2020-10-11 RX ORDER — FLUOXETINE HYDROCHLORIDE 20 MG/1
40 CAPSULE ORAL DAILY
Status: DISCONTINUED | OUTPATIENT
Start: 2020-10-12 | End: 2020-10-12 | Stop reason: HOSPADM

## 2020-10-11 RX ORDER — SODIUM CHLORIDE, SODIUM LACTATE, POTASSIUM CHLORIDE, CALCIUM CHLORIDE 600; 310; 30; 20 MG/100ML; MG/100ML; MG/100ML; MG/100ML
INJECTION, SOLUTION INTRAVENOUS CONTINUOUS
Status: DISCONTINUED | OUTPATIENT
Start: 2020-10-11 | End: 2020-10-12 | Stop reason: HOSPADM

## 2020-10-11 RX ORDER — METHOCARBAMOL 500 MG/1
1500 TABLET, FILM COATED ORAL 2 TIMES DAILY
Status: DISCONTINUED | OUTPATIENT
Start: 2020-10-11 | End: 2020-10-12 | Stop reason: HOSPADM

## 2020-10-11 RX ORDER — ERGOCALCIFEROL 1.25 MG/1
50000 CAPSULE ORAL
Status: DISCONTINUED | OUTPATIENT
Start: 2020-10-18 | End: 2020-10-12 | Stop reason: HOSPADM

## 2020-10-11 RX ORDER — OXCARBAZEPINE 150 MG/1
TABLET, FILM COATED ORAL
Qty: 60 TAB | Refills: 0 | Status: ON HOLD | OUTPATIENT
Start: 2020-10-11 | End: 2020-12-30

## 2020-10-11 RX ORDER — PROCHLORPERAZINE EDISYLATE 5 MG/ML
5-10 INJECTION INTRAMUSCULAR; INTRAVENOUS EVERY 4 HOURS PRN
Status: DISCONTINUED | OUTPATIENT
Start: 2020-10-11 | End: 2020-10-12 | Stop reason: HOSPADM

## 2020-10-11 RX ORDER — SODIUM BICARBONATE 650 MG/1
1950 TABLET ORAL 2 TIMES DAILY
Status: DISCONTINUED | OUTPATIENT
Start: 2020-10-11 | End: 2020-10-12 | Stop reason: HOSPADM

## 2020-10-11 RX ORDER — OXYCODONE HYDROCHLORIDE 5 MG/1
10 TABLET ORAL
Status: DISCONTINUED | OUTPATIENT
Start: 2020-10-11 | End: 2020-10-12 | Stop reason: HOSPADM

## 2020-10-11 RX ORDER — ACETAMINOPHEN 325 MG/1
650 TABLET ORAL EVERY 6 HOURS PRN
Status: DISCONTINUED | OUTPATIENT
Start: 2020-10-11 | End: 2020-10-12 | Stop reason: HOSPADM

## 2020-10-11 RX ORDER — GABAPENTIN 300 MG/1
300 CAPSULE ORAL
Status: DISCONTINUED | OUTPATIENT
Start: 2020-10-11 | End: 2020-10-12 | Stop reason: HOSPADM

## 2020-10-11 RX ORDER — PROMETHAZINE HYDROCHLORIDE 25 MG/1
12.5-25 TABLET ORAL EVERY 4 HOURS PRN
Status: DISCONTINUED | OUTPATIENT
Start: 2020-10-11 | End: 2020-10-12 | Stop reason: HOSPADM

## 2020-10-11 RX ORDER — ONDANSETRON 4 MG/1
4 TABLET, ORALLY DISINTEGRATING ORAL EVERY 4 HOURS PRN
Status: DISCONTINUED | OUTPATIENT
Start: 2020-10-11 | End: 2020-10-12 | Stop reason: HOSPADM

## 2020-10-11 RX ORDER — ACETAZOLAMIDE 250 MG/1
1000 TABLET ORAL EVERY EVENING
Status: DISCONTINUED | OUTPATIENT
Start: 2020-10-11 | End: 2020-10-12 | Stop reason: HOSPADM

## 2020-10-11 RX ORDER — PROMETHAZINE HYDROCHLORIDE 25 MG/1
12.5-25 SUPPOSITORY RECTAL EVERY 4 HOURS PRN
Status: DISCONTINUED | OUTPATIENT
Start: 2020-10-11 | End: 2020-10-12 | Stop reason: HOSPADM

## 2020-10-11 RX ADMIN — MEROPENEM 500 MG: 1 INJECTION, POWDER, FOR SOLUTION INTRAVENOUS at 20:32

## 2020-10-11 RX ADMIN — MYCOPHENOLATE MOFETIL 1000 MG: 250 CAPSULE ORAL at 22:28

## 2020-10-11 RX ADMIN — ZIPRASIDONE HYDROCHLORIDE 40 MG: 20 CAPSULE ORAL at 22:30

## 2020-10-11 RX ADMIN — ACETAZOLAMIDE 1000 MG: 250 TABLET ORAL at 22:31

## 2020-10-11 RX ADMIN — TRAZODONE HYDROCHLORIDE 100 MG: 50 TABLET ORAL at 22:29

## 2020-10-11 RX ADMIN — TOPIRAMATE 50 MG: 25 TABLET, FILM COATED ORAL at 22:29

## 2020-10-11 RX ADMIN — SODIUM CHLORIDE 1000 ML: 9 INJECTION, SOLUTION INTRAVENOUS at 19:00

## 2020-10-11 RX ADMIN — ONDANSETRON 4 MG: 2 INJECTION INTRAMUSCULAR; INTRAVENOUS at 19:24

## 2020-10-11 RX ADMIN — OXYCODONE 5 MG: 5 TABLET ORAL at 22:51

## 2020-10-11 RX ADMIN — OXYBUTYNIN CHLORIDE 5 MG: 5 TABLET ORAL at 22:29

## 2020-10-11 RX ADMIN — SODIUM CHLORIDE, POTASSIUM CHLORIDE, SODIUM LACTATE AND CALCIUM CHLORIDE: 600; 310; 30; 20 INJECTION, SOLUTION INTRAVENOUS at 21:27

## 2020-10-11 RX ADMIN — BUSPIRONE HYDROCHLORIDE 10 MG: 5 TABLET ORAL at 22:28

## 2020-10-11 RX ADMIN — METHOCARBAMOL TABLETS 1500 MG: 500 TABLET, COATED ORAL at 22:27

## 2020-10-11 RX ADMIN — OXCARBAZEPINE 150 MG: 300 TABLET, FILM COATED ORAL at 22:29

## 2020-10-11 RX ADMIN — GABAPENTIN 300 MG: 300 CAPSULE ORAL at 22:29

## 2020-10-11 RX ADMIN — SODIUM BICARBONATE 650 MG TABLET 1950 MG: at 22:27

## 2020-10-11 RX ADMIN — POTASSIUM CHLORIDE 20 MEQ: 1500 TABLET, EXTENDED RELEASE ORAL at 22:30

## 2020-10-11 RX ADMIN — MORPHINE SULFATE 4 MG: 4 INJECTION INTRAVENOUS at 19:24

## 2020-10-11 ASSESSMENT — PATIENT HEALTH QUESTIONNAIRE - PHQ9
9. THOUGHTS THAT YOU WOULD BE BETTER OFF DEAD, OR OF HURTING YOURSELF: NOT AT ALL
SUM OF ALL RESPONSES TO PHQ9 QUESTIONS 1 AND 2: 6
7. TROUBLE CONCENTRATING ON THINGS, SUCH AS READING THE NEWSPAPER OR WATCHING TELEVISION: NOT AT ALL
2. FEELING DOWN, DEPRESSED, IRRITABLE, OR HOPELESS: NEARLY EVERY DAY
8. MOVING OR SPEAKING SO SLOWLY THAT OTHER PEOPLE COULD HAVE NOTICED. OR THE OPPOSITE, BEING SO FIGETY OR RESTLESS THAT YOU HAVE BEEN MOVING AROUND A LOT MORE THAN USUAL: NOT AT ALL
3. TROUBLE FALLING OR STAYING ASLEEP OR SLEEPING TOO MUCH: NEARLY EVERY DAY
SUM OF ALL RESPONSES TO PHQ QUESTIONS 1-9: 15
4. FEELING TIRED OR HAVING LITTLE ENERGY: NEARLY EVERY DAY
6. FEELING BAD ABOUT YOURSELF - OR THAT YOU ARE A FAILURE OR HAVE LET YOURSELF OR YOUR FAMILY DOWN: NOT AL ALL
1. LITTLE INTEREST OR PLEASURE IN DOING THINGS: NEARLY EVERY DAY
5. POOR APPETITE OR OVEREATING: NEARLY EVERY DAY

## 2020-10-11 ASSESSMENT — COGNITIVE AND FUNCTIONAL STATUS - GENERAL
CLIMB 3 TO 5 STEPS WITH RAILING: A LITTLE
STANDING UP FROM CHAIR USING ARMS: A LITTLE
SUGGESTED CMS G CODE MODIFIER DAILY ACTIVITY: CI
DAILY ACTIVITIY SCORE: 23
MOVING FROM LYING ON BACK TO SITTING ON SIDE OF FLAT BED: A LITTLE
MOBILITY SCORE: 20
SUGGESTED CMS G CODE MODIFIER MOBILITY: CJ
WALKING IN HOSPITAL ROOM: A LITTLE
TOILETING: A LITTLE

## 2020-10-11 ASSESSMENT — LIFESTYLE VARIABLES
CONSUMPTION TOTAL: NEGATIVE
HAVE PEOPLE ANNOYED YOU BY CRITICIZING YOUR DRINKING: NO
TOTAL SCORE: 0
HOW MANY TIMES IN THE PAST YEAR HAVE YOU HAD 5 OR MORE DRINKS IN A DAY: 0
ALCOHOL_USE: NO
TOTAL SCORE: 0
EVER HAD A DRINK FIRST THING IN THE MORNING TO STEADY YOUR NERVES TO GET RID OF A HANGOVER: NO
AVERAGE NUMBER OF DAYS PER WEEK YOU HAVE A DRINK CONTAINING ALCOHOL: 0
EVER FELT BAD OR GUILTY ABOUT YOUR DRINKING: NO
HAVE YOU EVER FELT YOU SHOULD CUT DOWN ON YOUR DRINKING: NO
TOTAL SCORE: 0
ON A TYPICAL DAY WHEN YOU DRINK ALCOHOL HOW MANY DRINKS DO YOU HAVE: 0

## 2020-10-11 ASSESSMENT — PAIN DESCRIPTION - PAIN TYPE
TYPE: ACUTE PAIN
TYPE: ACUTE PAIN

## 2020-10-11 ASSESSMENT — FIBROSIS 4 INDEX
FIB4 SCORE: 0.36
FIB4 SCORE: 0.36
FIB4 SCORE: 0.35

## 2020-10-11 NOTE — ED NOTES
ED Positive Culture Follow-up/Notification Note:    Date: 10/11/20     Patient seen in the ED on 10/8/2020 for L flank pain ~2 days prior to presentation to the ED w/ decreasing urine. Of note, pt has a hx of transverse myelitis and incontinence that resulted in a suprapubic catheter. Pt has also been diagnosed with ESBL in the past and follows with a urologist, Dr. Rosenbaum. Pt was given IV fluids in the ED which resulted in further urine output. Pt was negative for leukocytosis, was afebrile, and CT was negative for pyelo. The ED provider concluded that, based on the minimal objective findings, this is a colonization vs active infection.  1. Generalized abdominal pain    2. Suprapubic catheter (HCC)       Discharge Medication List as of 10/8/2020  3:27 PM          Allergies: Depakote [divalproex sodium], Amitriptyline, Aripiprazole [abilify], Clindamycin, Flagyl [metronidazole hcl], Flomax [tamsulosin hydrochloride], Levaquin, Metformin, Tape, Vancomycin, Wound dressing adhesive, Ciprofloxacin, Hydromorphone hcl, Keflex, Levofloxacin, Metronidazole, Penicillins, Tamsulosin, and Valproic acid     Vitals:    10/08/20 1043 10/08/20 1230 10/08/20 1430 10/08/20 1523   BP: 133/57   119/69   Pulse: 75 67 69 63   Resp: 20      Temp: 36 °C (96.8 °F)      TempSrc: Temporal      SpO2: 99% 98% 98% 96%   Weight:       Height:           Final cultures:   Results     Procedure Component Value Units Date/Time    URINE CULTURE(NEW) [[id number]]  (Abnormal)  (Susceptibility) Collected: 10/08/20 1047    Order Status: Completed Specimen: Urine Updated: 10/10/20 1009     Significant Indicator POS     Source UR     Site -     Culture Result Mixed skin letty <10,000 cfu/mL      Klebsiella pneumoniae ESBL  >100,000 cfu/mL  Extended Spectrum Beta-lactamase (ESBL) isolated.  ESBL's may be clinically resistant to therapy with  Penicillins,Cephalosporins or Aztreonam despite  apparent in vitro susceptibility to some of these agents.  The patient  "requires contact isolation.  Please contact pharmacy or an Infectious Disease Specialist  if you have any questions about appropriate therapy.      Narrative:      CALL  [facility]  [facility] tel. [phone],  CALLED  [facility] tel. [phone] 10/10/2020, 10:09, RB PERF. RESULTS CALLED TO:  [phone]    Susceptibility     Klebsiella pneumoniae esbl (1)     Antibiotic Interpretation Microscan Method Status    Ampicillin Resistant >16 mcg/mL TYLER Final    Ceftriaxone Resistant <=1 mcg/mL TYLER Final    Ceftazidime Extended Spectrum Beta Lactamase >16 mcg/mL TYLER Final    Cefotaxime Extended Spectrum Beta Lactamase >32 mcg/mL TYLER Final    Cefazolin Resistant <=2 mcg/mL TYLER Final    Ciprofloxacin Resistant >2 mcg/mL TYLER Final    Ampicillin/sulbactam Sensitive <=8/4 mcg/mL TYLER Final    Cefepime Resistant 4 mcg/mL TYLER Final    Tobramycin Intermediate 8 mcg/mL TYLER Final    Cefotetan Sensitive <=16 mcg/mL TYLER Final    Nitrofurantoin Sensitive <=32 mcg/mL TYLER Final    Gentamicin Resistant >8 mcg/mL TYLER Final    Levofloxacin Intermediate 4 mcg/mL TYLER Final    Pip/Tazobactam Sensitive <=16 mcg/mL TYLER Final    Trimeth/Sulfa Sensitive <=2/38 mcg/mL TYLER Final                   URINALYSIS,CULTURE IF INDICATED [[id number]]  (Abnormal) Collected: 10/08/20 1047    Order Status: Completed Specimen: Urine Updated: 10/08/20 1110     Color Yellow     Character Cloudy     Specific Gravity 1.025     Ph 5.5     Glucose Negative mg/dL      Ketones Negative mg/dL      Protein 30 mg/dL      Bilirubin Negative     Nitrite Positive     Leukocyte Esterase Small     Occult Blood Large     Micro Urine Req Microscopic    URINALYSIS [[id number]]     Order Status: Canceled Specimen: Urine, Clean Catch           Plan:   Called pt to discuss cx result and she stated that she feels about the same/worse since her trip to the emergency department. She also endorsed new onset \"green goop and flakes\" coming from her suprapubic catheter since presenting to the ED. Pt also endorsed " "chills and \"really not feeling well.\" Pt also endorsed feeling off balance and \"like her head isn't right.\"  Given these new sx and potential worsening of her condition, advised that pt come back to the ED for further evaluation. Pt expressed understanding and was in agreement.    Frederic Allen, PharmD  "

## 2020-10-12 ENCOUNTER — PATIENT OUTREACH (OUTPATIENT)
Dept: HEALTH INFORMATION MANAGEMENT | Facility: OTHER | Age: 30
End: 2020-10-12

## 2020-10-12 VITALS
DIASTOLIC BLOOD PRESSURE: 55 MMHG | SYSTOLIC BLOOD PRESSURE: 103 MMHG | HEIGHT: 65 IN | RESPIRATION RATE: 18 BRPM | BODY MASS INDEX: 42.49 KG/M2 | OXYGEN SATURATION: 95 % | HEART RATE: 65 BPM | TEMPERATURE: 98.6 F | WEIGHT: 255 LBS

## 2020-10-12 LAB
ANION GAP SERPL CALC-SCNC: 9 MMOL/L (ref 7–16)
BACTERIA UR CULT: ABNORMAL
BACTERIA UR CULT: ABNORMAL
BASOPHILS # BLD AUTO: 0.6 % (ref 0–1.8)
BASOPHILS # BLD: 0.03 K/UL (ref 0–0.12)
BUN SERPL-MCNC: 6 MG/DL (ref 8–22)
CALCIUM SERPL-MCNC: 8.1 MG/DL (ref 8.4–10.2)
CHLORIDE SERPL-SCNC: 119 MMOL/L (ref 96–112)
CO2 SERPL-SCNC: 15 MMOL/L (ref 20–33)
CREAT SERPL-MCNC: 0.55 MG/DL (ref 0.5–1.4)
EOSINOPHIL # BLD AUTO: 0.09 K/UL (ref 0–0.51)
EOSINOPHIL NFR BLD: 1.8 % (ref 0–6.9)
ERYTHROCYTE [DISTWIDTH] IN BLOOD BY AUTOMATED COUNT: 47 FL (ref 35.9–50)
GLUCOSE BLD-MCNC: 124 MG/DL (ref 65–99)
GLUCOSE BLD-MCNC: 85 MG/DL (ref 65–99)
GLUCOSE SERPL-MCNC: 103 MG/DL (ref 65–99)
HCT VFR BLD AUTO: 37.9 % (ref 37–47)
HGB BLD-MCNC: 11.4 G/DL (ref 12–16)
IMM GRANULOCYTES # BLD AUTO: 0.03 K/UL (ref 0–0.11)
IMM GRANULOCYTES NFR BLD AUTO: 0.6 % (ref 0–0.9)
LYMPHOCYTES # BLD AUTO: 1.21 K/UL (ref 1–4.8)
LYMPHOCYTES NFR BLD: 24.4 % (ref 22–41)
MCH RBC QN AUTO: 27.8 PG (ref 27–33)
MCHC RBC AUTO-ENTMCNC: 30.1 G/DL (ref 33.6–35)
MCV RBC AUTO: 92.4 FL (ref 81.4–97.8)
MONOCYTES # BLD AUTO: 0.34 K/UL (ref 0–0.85)
MONOCYTES NFR BLD AUTO: 6.9 % (ref 0–13.4)
NEUTROPHILS # BLD AUTO: 3.26 K/UL (ref 2–7.15)
NEUTROPHILS NFR BLD: 65.7 % (ref 44–72)
NRBC # BLD AUTO: 0 K/UL
NRBC BLD-RTO: 0 /100 WBC
PLATELET # BLD AUTO: 161 K/UL (ref 164–446)
PMV BLD AUTO: 12.2 FL (ref 9–12.9)
POTASSIUM SERPL-SCNC: 4 MMOL/L (ref 3.6–5.5)
RBC # BLD AUTO: 4.1 M/UL (ref 4.2–5.4)
SARS-COV-2 RNA RESP QL NAA+PROBE: NOTDETECTED
SIGNIFICANT IND 70042: ABNORMAL
SITE SITE: ABNORMAL
SODIUM SERPL-SCNC: 143 MMOL/L (ref 135–145)
SOURCE SOURCE: ABNORMAL
SPECIMEN SOURCE: NORMAL
WBC # BLD AUTO: 5 K/UL (ref 4.8–10.8)

## 2020-10-12 PROCEDURE — 700111 HCHG RX REV CODE 636 W/ 250 OVERRIDE (IP): Performed by: INTERNAL MEDICINE

## 2020-10-12 PROCEDURE — A9270 NON-COVERED ITEM OR SERVICE: HCPCS | Performed by: STUDENT IN AN ORGANIZED HEALTH CARE EDUCATION/TRAINING PROGRAM

## 2020-10-12 PROCEDURE — 82962 GLUCOSE BLOOD TEST: CPT

## 2020-10-12 PROCEDURE — A9270 NON-COVERED ITEM OR SERVICE: HCPCS | Performed by: INTERNAL MEDICINE

## 2020-10-12 PROCEDURE — 85025 COMPLETE CBC W/AUTO DIFF WBC: CPT

## 2020-10-12 PROCEDURE — 36415 COLL VENOUS BLD VENIPUNCTURE: CPT

## 2020-10-12 PROCEDURE — 700105 HCHG RX REV CODE 258: Performed by: INTERNAL MEDICINE

## 2020-10-12 PROCEDURE — 700102 HCHG RX REV CODE 250 W/ 637 OVERRIDE(OP): Performed by: INTERNAL MEDICINE

## 2020-10-12 PROCEDURE — 99239 HOSP IP/OBS DSCHRG MGMT >30: CPT | Performed by: STUDENT IN AN ORGANIZED HEALTH CARE EDUCATION/TRAINING PROGRAM

## 2020-10-12 PROCEDURE — 80048 BASIC METABOLIC PNL TOTAL CA: CPT

## 2020-10-12 PROCEDURE — 700102 HCHG RX REV CODE 250 W/ 637 OVERRIDE(OP): Performed by: STUDENT IN AN ORGANIZED HEALTH CARE EDUCATION/TRAINING PROGRAM

## 2020-10-12 RX ORDER — DIPHENHYDRAMINE HCL 25 MG
25 TABLET ORAL ONCE
Status: COMPLETED | OUTPATIENT
Start: 2020-10-12 | End: 2020-10-12

## 2020-10-12 RX ORDER — SULFAMETHOXAZOLE AND TRIMETHOPRIM 800; 160 MG/1; MG/1
1 TABLET ORAL 2 TIMES DAILY
Qty: 20 TAB | Refills: 0 | Status: SHIPPED | OUTPATIENT
Start: 2020-10-12 | End: 2020-10-22

## 2020-10-12 RX ADMIN — ASPIRIN 81 MG: 81 TABLET, COATED ORAL at 06:08

## 2020-10-12 RX ADMIN — MEROPENEM 500 MG: 500 INJECTION, POWDER, FOR SOLUTION INTRAVENOUS at 08:16

## 2020-10-12 RX ADMIN — TOPIRAMATE 50 MG: 25 TABLET, FILM COATED ORAL at 06:08

## 2020-10-12 RX ADMIN — OXYCODONE 10 MG: 5 TABLET ORAL at 11:31

## 2020-10-12 RX ADMIN — PREDNISONE 10 MG: 10 TABLET ORAL at 06:08

## 2020-10-12 RX ADMIN — LEVOTHYROXINE SODIUM 75 MCG: 0.05 TABLET ORAL at 06:09

## 2020-10-12 RX ADMIN — PREGABALIN 300 MG: 100 CAPSULE ORAL at 08:16

## 2020-10-12 RX ADMIN — OXYCODONE 10 MG: 5 TABLET ORAL at 08:32

## 2020-10-12 RX ADMIN — ACETAZOLAMIDE 1500 MG: 250 TABLET ORAL at 07:23

## 2020-10-12 RX ADMIN — BUSPIRONE HYDROCHLORIDE 10 MG: 5 TABLET ORAL at 06:08

## 2020-10-12 RX ADMIN — MEROPENEM 500 MG: 500 INJECTION, POWDER, FOR SOLUTION INTRAVENOUS at 14:52

## 2020-10-12 RX ADMIN — ENOXAPARIN SODIUM 40 MG: 40 INJECTION SUBCUTANEOUS at 06:07

## 2020-10-12 RX ADMIN — MEROPENEM 500 MG: 500 INJECTION, POWDER, FOR SOLUTION INTRAVENOUS at 02:30

## 2020-10-12 RX ADMIN — SODIUM CHLORIDE, POTASSIUM CHLORIDE, SODIUM LACTATE AND CALCIUM CHLORIDE: 600; 310; 30; 20 INJECTION, SOLUTION INTRAVENOUS at 06:16

## 2020-10-12 RX ADMIN — MYCOPHENOLATE MOFETIL 1000 MG: 250 CAPSULE ORAL at 08:16

## 2020-10-12 RX ADMIN — ZIPRASIDONE HYDROCHLORIDE 40 MG: 20 CAPSULE ORAL at 06:08

## 2020-10-12 RX ADMIN — DIPHENHYDRAMINE HCL 25 MG: 25 TABLET ORAL at 13:25

## 2020-10-12 RX ADMIN — OXCARBAZEPINE 150 MG: 300 TABLET, FILM COATED ORAL at 06:09

## 2020-10-12 RX ADMIN — POTASSIUM CHLORIDE 20 MEQ: 1500 TABLET, EXTENDED RELEASE ORAL at 06:09

## 2020-10-12 RX ADMIN — OXYBUTYNIN CHLORIDE 5 MG: 5 TABLET ORAL at 11:31

## 2020-10-12 RX ADMIN — FLUOXETINE 40 MG: 20 CAPSULE ORAL at 07:23

## 2020-10-12 RX ADMIN — OXYBUTYNIN CHLORIDE 5 MG: 5 TABLET ORAL at 06:08

## 2020-10-12 RX ADMIN — METHOCARBAMOL TABLETS 1500 MG: 500 TABLET, COATED ORAL at 06:08

## 2020-10-12 RX ADMIN — SODIUM BICARBONATE 650 MG TABLET 1950 MG: at 06:08

## 2020-10-12 RX ADMIN — OMEPRAZOLE 20 MG: 20 CAPSULE, DELAYED RELEASE ORAL at 06:09

## 2020-10-12 ASSESSMENT — COGNITIVE AND FUNCTIONAL STATUS - GENERAL
SUGGESTED CMS G CODE MODIFIER DAILY ACTIVITY: CI
WALKING IN HOSPITAL ROOM: A LOT
SUGGESTED CMS G CODE MODIFIER MOBILITY: CK
DAILY ACTIVITIY SCORE: 23
MOVING FROM LYING ON BACK TO SITTING ON SIDE OF FLAT BED: A LITTLE
STANDING UP FROM CHAIR USING ARMS: A LOT
TOILETING: A LITTLE
CLIMB 3 TO 5 STEPS WITH RAILING: A LOT
MOBILITY SCORE: 17

## 2020-10-12 ASSESSMENT — PAIN DESCRIPTION - PAIN TYPE
TYPE: ACUTE PAIN
TYPE: ACUTE PAIN

## 2020-10-12 NOTE — PROGRESS NOTES
Pt arrived via gurney, admitted to room 210-2 from ED at 2115. Pt is A&Ox4, c/o pain reported at suprapubic catheter site and R flank at 8/10 on a scale of 0-10. Completing med rec with pt. Pt encouraged to rest, given warm blanket at this time. Suprapubic catheter site observed, gauze dressing applied with tegaderm. LR running at 125ml/hr per MAR. Will return for evening meds. Oriented to room call light and smoking policy. Reviewed plan of care with the patient. Fall precaution in place. Bed locked. Bet at lowest position. Call light within reach. Encouraged pt the importance to call for assistance.

## 2020-10-12 NOTE — ED NOTES
Report called to receiving RN - Coleen - receiving RN had no questions or concerns and is ready for pt transport.

## 2020-10-12 NOTE — ASSESSMENT & PLAN NOTE
-Resume home Lantus.  Hold oral hypoglycemic agents for now.  I will place her on sliding scale insulin coverage while in-house. Accu-Cheks before meals and at bedtime. Goal to keep BG between 140-180 per 2019 ADA guidelines.

## 2020-10-12 NOTE — H&P
Hospital Medicine History & Physical Note    Date of Service  10/11/2020    Primary Care Physician  Torres Brody M.D.    Consultants  none    Code Status  Full Code    Chief Complaint  Chief Complaint   Patient presents with   • Abdominal Pain   • N/V   • Lightheadedness       History of Presenting Illness  29 y.o. female with significant psychiatric history (bipolar disorder, schizophrenia), with type 2 diabetes mellitus, and known transverse myelitis with suprapubic catheter in place, and history of ESBL UTI, who was initially seen on 10/8/2020 in the ED with left flank pain, and diminished urine output from the suprapubic catheter, along with intermittent hematuria.  CT scan at that time did not show any pyelonephritis or obstruction.  Apparently she was not sent home on any antibiotics then.  On the following days, she continued to have progressively worsening symptoms of increased left flank pain, along with suprapubic pain described as sharp, rated 9 out of 10 in severity, with no radiation.  She also noticed purulent discharge from the suprapubic insertion site.  She had chills, but had no documented fevers.  She had some nausea.  She continued to have intermittent hematuria.  She had no bowel movement changes, chest pain, shortness of breath.  Due to her persistent and progressive symptoms, she then decided to go to the ED today.      ED course:  The patient was initially evaluated.  Vital signs were stable.  Initial blood work-up showed no leukocytosis, with normal electrolytes and renal function.  Lipase was normal.  Lactic acid was normal.  Her urine cultures from 10/8/2020 was shown to have grown ESBL Klebsiella pneumoniae, sensitive to Unasyn, Zosyn, and Bactrim.  However, as she had allergies to those medications, she was given a dose of meropenem.  She was also given normal saline.  She was subsequently admitted to the hospitalist service.    Review of Systems  ROS     Pertinent positives/negatives  as mentioned above.     A complete review of systems was personally done by me. All other systems were negative.       Past Medical History   has a past medical history of Abdominal pain, Anginal syndrome, Apnea, sleep, Arrhythmia, Arthritis, ASTHMA, Atrial fibrillation (HCC), Back pain, Borderline personality disorder (HCC), Breath shortness, Bronchitis, Cardiac arrhythmia, Chickenpox, Chronic UTI (9/18/2010), Cough, Daytime sleepiness, Depression, Diabetes (HCC), Diarrhea, Disorder of thyroid, Fall, Fatigue, Frequent headaches, Gasping for breath, Gynecological disorder, Headache(784.0), Heart burn, History of falling, Hypertension, Indigestion, Migraine, 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HCC), 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HCC), Nausea, Obesity, Other fatigue (6/29/2020), Pain (08-15-12), Painful joint, PCOS (polycystic ovarian syndrome), Pneumonia (2012), Psychosis ([facility]), Renal disorder, Ringing in ears, Scoliosis, Shortness of breath, Sinus tachycardia (10/31/2013), Sleep apnea, Snoring, Tonsillitis, 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    Surgical History   has a past surgical history that includes neuro dest facet l/s w/ig sngl (4/21/2015); recovery (1/27/2016); delmar by laparoscopy (8/29/2010); lumbar fusion anterior (8/21/2012); other cardiac surgery (1/2016); tonsillectomy; bowel stimulator insertion (7/13/2016); gastroscopy with balloon dilatation (N/A, 1/4/2017); muscle biopsy (Right, 1/26/2017); other abdominal surgery; and laminotomy.     Family History  family history includes Genitourinary () Problems in her sister; Heart Disease in her maternal grandmother and mother; Hypertension in her maternal grandmother, maternal uncle, and mother; No Known Problems in her sister; Other in her mother and sister; Sleep Apnea in her mother.     Social History   reports that she has never smoked. She has never used smokeless tobacco. She  "reports previous drug use. Frequency: 7.00 times per week. Drug: Marijuana. She reports that she does not drink alcohol.    Allergies  Allergies   Allergen Reactions   • Depakote [Divalproex Sodium] Unspecified     Muscle spasms/muscle pain and weakness     • Amitriptyline Unspecified     Headaches     • Aripiprazole [Abilify] Unspecified     Headaches/muscle twitching     • Clindamycin Nausea     Even with food     • Flagyl [Metronidazole Hcl] Unspecified     \"eye problems\"     • Flomax [Tamsulosin Hydrochloride] Swelling   • Levaquin Unspecified     Severe muscle cramps in legs  RXN=unknown   • Metformin Unspecified     Increased lactic acid      • Tape Rash     Tears skin off  coban with Tegaderm tape ok intermittently  RXN=ongoing   • Vancomycin Itching     Pt becomes flushed in face and chest.   RXN=7/10/16   • Wound Dressing Adhesive Hives     By pt report   • Ciprofloxacin    • Hydromorphone Hcl      Pt states she feels loopy   • Keflex Rash     Pt states she gets a rash with this medication  Tolerates ceftriaxone   • Levofloxacin Unspecified     Leg muscle cramps   • Metronidazole Rash     .   • Penicillins    • Tamsulosin    • Valproic Acid Rash     .       Medications  Prior to Admission Medications   Prescriptions Last Dose Informant Patient Reported? Taking?   Dulaglutide (TRULICITY) 1.5 MG/0.5ML Solution Pen-injector  Family Member Yes No   Sig: Inject 0.5 mL as instructed every Friday.   Melatonin 10 MG Tab  Family Member Yes No   Sig: Take 10 mg by mouth every evening.   Mirabegron ER (MYRBETRIQ) 50 MG TABLET SR 24 HR  Family Member Yes No   Sig: Take 50 mg by mouth every day.   OXcarbazepine (TRILEPTAL) 150 MG Tab  Family Member Yes No   Sig: Take 150 mg by mouth 2 times a day.   acetaZOLAMIDE SR (DIAMOX) 500 MG CAPSULE SR 12 HR  Family Member Yes No   Sig: Take 1,000-1,500 mg by mouth 2 times a day. 1500mg in AM  1000mg at PM   albuterol 108 (90 Base) MCG/ACT Aero Soln inhalation aerosol  Family " Member Yes No   Sig: Inhale 2 Puffs by mouth every 6 hours as needed for Shortness of Breath.   aspirin EC (ECOTRIN) 81 MG Tablet Delayed Response  Family Member Yes No   Sig: Take 81 mg by mouth every day.   busPIRone (BUSPAR) 10 MG Tab tablet  Family Member Yes No   Sig: Take 10 mg by mouth 2 times a day.   calcium carbonate (TUMS EX) 750 MG chewable tablet  Family Member No No   Sig: Take 2 Tabs by mouth every day.   Patient not taking: Reported on 10/8/2020   etonogestrel (NEXPLANON) 68 MG Implant implant  Family Member Yes No   Sig: Inject 1 Each as instructed Once.   fluoxetine (PROZAC) 40 MG capsule  Family Member No No   Sig: Take 1 Cap by mouth every day.   furosemide (LASIX) 80 MG Tab  Family Member Yes No   Sig: Take 80 mg by mouth 1 time daily as needed.   gabapentin (NEURONTIN) 300 MG Cap  Family Member Yes No   Sig: Take 300 mg by mouth every bedtime.   insulin glargine (LANTUS) 100 UNIT/ML Solution  Family Member No No   Sig: Inject 8 Units as instructed every evening.   Patient taking differently: Inject 12 Units as instructed every evening.   ivabradine (CORLANOR) 7.5 MG Tab tablet  Family Member Yes No   Sig: Take 7.5 mg by mouth 2 times a day, with meals.   levothyroxine (SYNTHROID) 75 MCG Tab  Family Member No No   Sig: Take 1 Tab by mouth Every morning on an empty stomach.   lidocaine (XYLOCAINE) 2 % Solution  Family Member No No   Sig: Take 5 mL by mouth as needed for Throat/Mouth Pain (q6hr PRN throat pain, ok to rinse and spit or swallow).   Patient not taking: Reported on 10/8/2020   methocarbamol (ROBAXIN) 750 MG Tab  Family Member Yes No   Sig: Take 1,500 mg by mouth 2 Times a Day.   mycophenolate (CELLCEPT) 500 MG tablet  Family Member Yes No   Sig: Take 1,000 mg by mouth 2 times a day.   nitrofurantoin (MACRODANTIN) 50 MG Cap  Family Member Yes No   Sig: Take 50 mg by mouth every day.   omeprazole (PRILOSEC) 20 MG delayed-release capsule  Family Member No No   Sig: Take 1 Cap by mouth  every day.   ondansetron (ZOFRAN ODT) 4 MG TABLET DISPERSIBLE  Family Member Yes No   Sig: Take 4 mg by mouth every 6 hours as needed for Nausea.   oxybutynin (DITROPAN) 5 MG Tab  Family Member No No   Sig: Take 1 Tab by mouth 3 times a day.   potassium chloride SA (KDUR) 20 MEQ Tab CR  Family Member Yes No   Sig: Take 20 mEq by mouth 2 times a day.   predniSONE (DELTASONE) 10 MG Tab  Family Member Yes No   Sig: Take 10 mg by mouth every day. Maintenance Medication.   pregabalin (LYRICA) 300 MG capsule  Family Member Yes No   Sig: Take 300 mg by mouth 2 times a day.   sodium bicarbonate (SODIUM BICARBONATE) 650 MG Tab  Family Member Yes No   Sig: Take 1,950 mg by mouth 2 times a day.   topiramate (TOPAMAX) 50 MG tablet  Family Member No No   Sig: Take 1 Tab by mouth 2 Times a Day.   traZODone (DESYREL) 100 MG Tab  Family Member No No   Sig: Take 1 Tab by mouth every bedtime.   vitamin D, Ergocalciferol, (DRISDOL) 1.25 MG (28548 UT) Cap capsule  Family Member Yes No   Sig: Take 50,000 Units by mouth every 7 days.   ziprasidone (GEODON) 40 MG Cap  Family Member No No   Sig: Take 1 Cap by mouth 2 Times a Day.      Facility-Administered Medications: None       Physical Exam  Temp:  [36.9 °C (98.4 °F)] 36.9 °C (98.4 °F)  Pulse:  [58-73] 62  Resp:  [16] 16  BP: (128-148)/(58-80) 148/80  SpO2:  [96 %-97 %] 97 %    Physical Exam  Vitals signs reviewed.   Constitutional:       General: She is not in acute distress.     Appearance: Normal appearance. She is obese. She is not ill-appearing or diaphoretic.      Comments: Body mass index is 42.56 kg/m².   HENT:      Head: Normocephalic and atraumatic.      Mouth/Throat:      Mouth: Mucous membranes are moist.      Pharynx: No oropharyngeal exudate or posterior oropharyngeal erythema.   Eyes:      General: No scleral icterus.     Extraocular Movements: Extraocular movements intact.      Conjunctiva/sclera: Conjunctivae normal.      Pupils: Pupils are equal, round, and reactive to  light.   Neck:      Musculoskeletal: Normal range of motion and neck supple. No neck rigidity or muscular tenderness.   Cardiovascular:      Rate and Rhythm: Normal rate and regular rhythm.      Heart sounds: Normal heart sounds. No murmur.   Pulmonary:      Effort: Pulmonary effort is normal. No respiratory distress.      Breath sounds: Normal breath sounds. No stridor. No wheezing, rhonchi or rales.   Chest:      Chest wall: No tenderness.   Abdominal:      General: Bowel sounds are normal. There is no distension.      Palpations: Abdomen is soft. There is no mass.      Tenderness: There is no abdominal tenderness. There is right CVA tenderness and left CVA tenderness. There is no guarding or rebound.      Comments: Suprapubic catheter in place, no hematuria, but with purulent discharge around insertion site, and whitish precipitates in the urine bag.   Musculoskeletal: Normal range of motion.         General: No swelling.      Right lower leg: No edema.      Left lower leg: No edema.   Lymphadenopathy:      Cervical: No cervical adenopathy.   Skin:     General: Skin is warm and dry.      Coloration: Skin is not jaundiced.      Findings: No rash.   Neurological:      General: No focal deficit present.      Mental Status: She is alert and oriented to person, place, and time. Mental status is at baseline.      Cranial Nerves: No cranial nerve deficit.   Psychiatric:         Mood and Affect: Mood normal.         Behavior: Behavior normal.         Thought Content: Thought content normal.         Judgment: Judgment normal.         Laboratory:  Recent Labs     10/09/20  1243 10/11/20  1806   WBC 5.0 6.0   RBC 4.78 4.48   HEMOGLOBIN 13.4 12.7   HEMATOCRIT 42.8 40.8   MCV 89.5 91.1   MCH 28.0 28.3   MCHC 31.3* 31.1*   RDW 46.2 46.4   PLATELETCT 206 177   MPV 12.6 12.3     Recent Labs     10/09/20  1243 10/11/20  1806   SODIUM 143 144   POTASSIUM 3.3* 4.1   CHLORIDE 114* 121*   CO2 16* 13*   GLUCOSE 107* 190*   BUN 8 8    CREATININE 0.75 0.77   CALCIUM 9.2 8.7     Recent Labs     10/09/20  1243 10/11/20  1806   ALTSGPT 23 19   ASTSGOT 12 9*   ALKPHOSPHAT 82 81   TBILIRUBIN 0.2 <0.2   LIPASE  --  47   GLUCOSE 107* 190*         No results for input(s): NTPROBNP in the last 72 hours.      No results for input(s): TROPONINT in the last 72 hours.    Imaging:  No orders to display         Assessment/Plan:  I anticipate this patient will require at least two midnights for appropriate medical management, necessitating inpatient admission.    * ESBL Klebsiella UTI (urinary tract infection) due to urinary indwelling catheter (HCC)- (present on admission)  Assessment & Plan  -With worsening symptoms, although she is not septic.  Urine culture from 10/8 growing ESBL Klebsiella pneumoniae, and unfortunately she has allergies to antibiotics that the organism is sensitive to.  -I will start her on IV meropenem.  She would likely need to complete the course of the IV antibiotics in the hospital.  Consider ID consult in the morning.  -Her suprapubic catheter will be changed.  -Pain control with oral oxycodone, and IV morphine.  -Monitor closely for sepsis.  Trend WBC count, watch for fevers.  I will place her on IV LR at 125 cc/h.  Close hemodynamic monitoring.    Type 2 diabetes mellitus without complication, with long-term current use of insulin ([facility])- (present on admission)  Assessment & Plan  -Resume home Lantus.  Hold oral hypoglycemic agents for now.  I will place her on sliding scale insulin coverage while in-house. Accu-Cheks before meals and at bedtime. Goal to keep BG between 140-180 per 2019 ADA guidelines.      Schizophrenia (HCC)- (present on admission)  Assessment & Plan  -Her home psychiatric medications will be resumed.    Morbidly obese (HCC)- (present on admission)  Assessment & Plan  - Body mass index is 42.56 kg/m²..  - Counseled on weight loss.  - outpatient referral for outpatient weight management.    Borderline personality  disorder in adult (HCC)- (present on admission)  Assessment & Plan  -Her home psychiatric medications will be resumed.      DVT prophylaxis: Lovenox SQ

## 2020-10-12 NOTE — FACE TO FACE
Face to Face Supporting Documentation - Home Health    The encounter with this patient was in whole or in part the primary reason for home health admission.    Date of encounter:   Patient:                    MRN:                       YOB: 2021  Kristin Balderrama  [id number]  1990     Home health to see patient for:  Home health aide    Skilled need for:  Exacerbation of Chronic Disease State transverse myelitis dependent on suprapubic cath    Skilled nursing interventions to include:  Comment: s/p suprapubic cath care    Homebound status evidenced by:  Needs the assistance of another person in order to leave the home. Leaving home requires a considerable and taxing effort. There is a normal inability to leave the home.    Community Physician to provide follow up care: Torres Brody M.D.     Optional Interventions? No      I certify the face to face encounter for this home health care referral meets the CMS requirements and the encounter/clinical assessment with the patient was, in whole, or in part, for the medical condition(s) listed above, which is the primary reason for home health care. Based on my clinical findings: the service(s) are medically necessary, support the need for home health care, and the homebound criteria are met.  I certify that this patient has had a face to face encounter by myself.  Bright Gamez M.D. - NPI: [id number]

## 2020-10-12 NOTE — DISCHARGE INSTRUCTIONS
Suprapubic Catheter Home Guide  A suprapubic catheter is a flexible tube that is used to drain urine from the bladder into a collection bag outside the body. The catheter is inserted into the bladder through a small opening in the lower abdomen, above the pubic bone (suprapubic area) and a few inches below your belly button (navel). A tiny balloon filled with germ-free (sterile) water helps to keep the catheter in place.  The collection bag must be emptied at least once a day and cleaned at least every other day. The collection bag can be put beside your bed at night and attached to your leg during the day. You may have a large collection bag to use at night and a smaller one to use during the day.  Your suprapubic catheter may need to be changed every 4-6 weeks, or as often as recommended by your health care provider. Healing of the tract where the catheter is placed can take 6 weeks to 6 months. During that time, your health care provider may change your catheter. Once the tract is well healed, you or a caregiver will change your suprapubic catheter at home.  What are the risks?  This catheter is safe to use. However, problems can occur, including:  · Blocked urine flow. This can occur if the catheter stops working, or if you have a blood clot in your bladder or in the catheter.  · Irritation of the skin around the catheter.  · Infection. This can happen if bacteria gets into your bladder.  Supplies needed:  · Two pairs of sterile gloves.  · Paper towels.  · Catheter.  · Two syringes.  · Sterile water.  · Sterile cleaning solution.  · Lubricant.  · Collection bags.  How to change the catheter    1. Drink plenty of fluids during the hours before you change the catheter.  2. Wash your hands with soap and water. If soap and water are not available, use hand .  3. Draw up sterile water into a syringe to have ready to fill the new catheter balloon. The amount will depend on the size of the balloon.  4. Have  all of your supplies ready and close to you on a paper towel.  5. Lie on your back, sitting slightly upright so that you can see the catheter and opening.  6. Put on sterile gloves.  7. Clean the skin around the catheter opening using the sterile cleaning solution.  8. Remove the water from the balloon in the catheter using a syringe.  9. Slowly remove the catheter. If the catheter seems stuck, or if you have difficulty removing it:  ? Do not pull on it.  ? Call your health care provider right away.  10. Place the old catheter on a paper towel to discard later.  11. Take off the used gloves, and put on a new pair.  12. Put lubricant on the end of the new catheter that will go into your bladder.  13. Clean the skin around the catheter opening using the sterile cleaning solution.  14. Gently slide the catheter through the opening in your abdomen and into the tract that leads to your bladder.  15. Wait for some urine to start flowing through the catheter.  16. When urine starts to flow through the catheter, attach the collection bag to the end of the catheter. Make sure the connection is tight.  17. Use a syringe to fill the catheter balloon with sterile water. Fill to the amount directed by your health care provider.  18. Remove the gloves and wash your hands with soap and water.  How to care for the skin around the catheter  Follow your health care provider's instructions on caring for your skin.  · Use a clean washcloth and soapy water to clean the skin around your catheter every day. Pat the area dry with a clean paper towel.  · Do not pull on the catheter.  · Do not use ointment or lotion on this area, unless told by your health care provider.  · Check the skin around the catheter every day for signs of infection. Check for:  ? Redness, swelling, or pain.  ? Fluid or blood.  ? Warmth.  ? Pus or a bad smell.  How to empty and clean the collection bag  Empty the large collection bag every 8 hours. Empty the small  collection bag when it is about ? full. Clean the collection bag every 2-3 days, or as often as told by your health care provider. To do this:  1. Wash your hands with soap and water. If soap and water are not available, use hand .  2. Disconnect the bag from the catheter and immediately attach a new bag to the catheter.  3. Hold the used bag over the toilet or another container.  4. Turn the valve (spigot) at the bottom of the bag to empty the urine. Empty the used bag completely.  ? Do not touch the opening of the spigot.  ? Do not let the opening touch the toilet or container.  5. Close the spigot tightly when the bag is empty.  6. Clean the used bag in one of the following methods:  ? According to the 's instructions.  ? As told by your health care provider.  7. Let the bag dry completely. Put it in a clean plastic bag before storing it.  General tips    · Always wash your hands before and after caring for your catheter and collection bag. Use a mild, fragrance-free soap. If soap and water are not available, use hand .  · Clean the outside of the catheter with soap and water as often as told by your health care provider.  · Always make sure there are no twists or kinks in the catheter tube.  · Always make sure there are no leaks in the catheter or collection bag.  · Always wear the leg bag below your knee.  · Make sure the overnight drainage bag is always lower than the level of your bladder, but do not let it touch the floor. Before you go to sleep, hang the bag inside a wastebasket that is covered by a clean plastic bag.  · Drink enough fluid to keep your urine pale yellow.  · Do not take baths, swim, or use a hot tub until your health care provider approves. Ask your health care provider if you may take showers.  Contact a melissa care provider if:  · You leak urine.  · You have redness, swelling, or pain around your catheter.  · You have fluid or blood coming from your catheter  opening.  · Your catheter opening feels warm to the touch.  · You have pus or a bad smell coming from your catheter opening.  · You have a fever or chills.  · Your urine flow slows down.  · Your urine becomes cloudy or smelly.  Get help right away if:  · Your catheter comes out.  · You have:  ? Nausea.  ? Back pain.  ? Difficulty changing your catheter.  ? Blood in your urine.  ? No urine flow for 1 hour.  Summary  · A suprapubic catheter is a flexible tube that is used to drain urine from the bladder into a collection bag outside the body.  · Your suprapubic catheter may need to be changed every 4-6 weeks, or as recommended by your health care provider.  · Follow instructions on how to change the catheter and how to empty and clean the collection bag.  · Always wash your hands before and after caring for your catheter and collection bag. Drink enough fluid to keep your urine pale yellow.  · Get help right away if you have difficulty changing your catheter or if there is blood in your urine.  This information is not intended to replace advice given to you by your health care provider. Make sure you discuss any questions you have with your health care provider.  Document Released: 09/04/2012 Document Revised: 04/09/2020 Document Reviewed: 01/22/2020  [employer] Patient Education © 2020 [employer]  ESBL Infection Information     ESBL is an enzyme that is made by some types of bacteria. This enzyme breaks down some antibiotic medicines, making them unable to kill the bacteria or treat the infection. The two most common types of bacteria that make ESBL in the stomach and intestines (gastrointestinal tract, or GI tract) are E. coli and Klebsiella. These types of bacteria help break down food so it can be used by the body for energy. Normally, they do not cause infection unless there are too many bacteria, or unless the bacteria travel to other parts of the body where they are not normally found.  ESBL infections are  "hard to treat. Bacteria that make ESBL are sometimes called \"super bugs\" because they are very hard to get rid of. Lab tests must be done to find the type of ESBL bacteria that is causing the infection and the right antibiotic medicine that will treat it.  What increases my risk of an ESBL infection?  You may be more likely to develop an ESBL infection if you:  · Are currently or have recently been a patient in a hospital, nursing home, or another care facility.  · Have another illness or a weakened disease-fighting system (immune system).  · Have drains or tubes placed in your body.  · Have used antibiotics in the past.  · Have sores or open wounds.  How do I get an ESBL infection?  ESBL bacteria can live outside the body on the skin or other surfaces. People who are infected with ESBL bacteria may shed the bacteria in their stool or body fluids such as urine or blood. These bacteria can be spread to surfaces such as bed linens, medical equipment, countertops, and bathroom fixtures. The bacteria can also be spread by direct contact when health care providers touch skin or body fluids that are infected.  How is an ESBL infection diagnosed?  You may have an exam or lab testing. You may have a blood, urine, or stool sample taken. Samples can be looked at under a microscope by trained health care providers who can identify the type of bacteria that is growing in the sample. These samples can also be tested to see if certain antibiotic medicines can kill the bacteria.  Can ESBL infections be treated?  ESBL infections can be treated after the right antibiotic medicine is found that can kill the bacteria. Antibiotic medicines may be given at home or in the hospital. Some antibiotic medicine can be taken by mouth, and others need to be given through an IV.  What are some things that hospitals are doing to prevent ESBL infections?  To prevent the spread of ESBL infections, health care providers will:  · Wash their hands " with soap and water or an alcohol-based hand  before they enter your room and before they leave your room.  · Clean and disinfect your room and the medical equipment that is being used for your care.  · Put a sign on your door to let visitors and health care providers know to use contact precautions when they care for you. This means health care providers will:  ? Put you in a private room.  ? Put on a gown with long sleeves and wear gloves when they care for you, then remove the gloves and gown before leaving your room.  ? Ask your visitors to wear a gown and gloves, if the visitors plan to help care for you.  ? Ask your visitors to wash or sanitize their hands before entering and before leaving your room.  ? Ask you not to leave your room to visit other areas of the hospital.  What can I do to help prevent ESBL infections?  You can help to prevent the spread of ESBL bacteria by:  · Reminding health care providers, cleaning staff, and visitors to wash their hands with soap and water or an alcohol-based hand  before they enter your room and before they leave your room.  · Taking antibiotics exactly as prescribed by your health care provider.  · Washing your hands with soap and water or an alcohol-based hand  after:  ? Using the bathroom.  ? Touching or coming in contact with your stool or body fluids.  ? Touching medical equipment or surfaces that may have come in contact with your stool or body fluids.  ? Touching or caring for wounds or open sores.  ? Touching or caring for tubes or drains that are placed in your body.  Can my visitors get ESBL?  Visitors are at risk for infection if they come in contact with unclean surfaces or equipment, or with your stool or body fluids while in your room. The risk of infection is higher if visitors then touch openings in their own bodies, such as their mouth or a sore. Doing that can allow the bacteria to enter their bodies. Casual contact, such as  "hugging or touching, will not spread ESBL.  Summary  · ESBL is an enzyme that is made by some types of bacteria.  · ESBL infections are hard to treat. Lab tests must be done to find the type of ESBL bacteria that is causing the infection and the right antibiotic medicine that will treat it.  · Bacteria that make ESBL are sometimes called \"super bugs\" because they are very hard to get rid of.  · One of the best ways to stop the spread of infection with ESBL is to wash your hands often with soap and water or an alcohol-based hand , especially after using the bathroom.  This information is not intended to replace advice given to you by your health care provider. Make sure you discuss any questions you have with your health care provider.  Document Released: 01/20/2018 Document Revised: 11/30/2018 Document Reviewed: 01/20/2018  [employer] Patient Education © 2020 [employer]    Discharge Instructions    Discharged to home by car with relative. Discharged via wheelchair, hospital escort: Yes.  Special equipment needed: Not Applicable    Be sure to schedule a follow-up appointment with your primary care doctor or any specialists as instructed.     Discharge Plan:        I understand that a diet low in cholesterol, fat, and sodium is recommended for good health. Unless I have been given specific instructions below for another diet, I accept this instruction as my diet prescription.   Other diet: Diabetic diet    Special Instructions:     Sulfamethoxazole; Trimethoprim, SMX-TMP tablets  What is this medicine?  SULFAMETHOXAZOLE; TRIMETHOPRIM or SMX-TMP (suhl fuh meth OK vlad zohl; trye METH oh prim) is a combination of a sulfonamide antibiotic and a second antibiotic, trimethoprim. It is used to treat or prevent certain kinds of bacterial infections. It will not work for colds, flu, or other viral infections.  This medicine may be used for other purposes; ask your health care provider or pharmacist if you have " questions.  COMMON BRAND NAME(S): Bacter-Aid DS, Bactrim, Bactrim DS, Septra, Septra DS  What should I tell my health care provider before I take this medicine?  They need to know if you have any of these conditions:  · anemia  · asthma  · being treated with anticonvulsants  · if you frequently drink alcohol containing drinks  · kidney disease  · liver disease  · low level of folic acid or glucose-6-phosphate dehydrogenase  · poor nutrition or malabsorption  · porphyria  · severe allergies  · thyroid disorder  · an unusual or allergic reaction to sulfamethoxazole, trimethoprim, sulfa drugs, other medicines, foods, dyes, or preservatives  · pregnant or trying to get pregnant  · breast-feeding  How should I use this medicine?  Take this medicine by mouth with a full glass of water. Follow the directions on the prescription label. Take your medicine at regular intervals. Do not take it more often than directed. Do not skip doses or stop your medicine early.  Talk to your pediatrician regarding the use of this medicine in children. Special care may be needed. This medicine has been used in children as young as 2 months of age.  Overdosage: If you think you have taken too much of this medicine contact a poison control center or emergency room at once.  NOTE: This medicine is only for you. Do not share this medicine with others.  What if I miss a dose?  If you miss a dose, take it as soon as you can. If it is almost time for your next dose, take only that dose. Do not take double or extra doses.  What may interact with this medicine?  Do not take this medicine with any of the following medications:  · aminobenzoate potassium  · dofetilide  · metronidazole  This medicine may also interact with the following medications:  · ACE inhibitors like benazepril, enalapril, lisinopril, and ramipril  · birth control pills  · cyclosporine  · digoxin  · diuretics  · indomethacin  · medicines for  diabetes  · methenamine  · methotrexate  · phenytoin  · potassium supplements  · pyrimethamine  · sulfinpyrazone  · tricyclic antidepressants  · warfarin  This list may not describe all possible interactions. Give your health care provider a list of all the medicines, herbs, non-prescription drugs, or dietary supplements you use. Also tell them if you smoke, drink alcohol, or use illegal drugs. Some items may interact with your medicine.  What should I watch for while using this medicine?  Tell your doctor or health care professional if your symptoms do not improve. Drink several glasses of water a day to reduce the risk of kidney problems.  Do not treat diarrhea with over the counter products. Contact your doctor if you have diarrhea that lasts more than 2 days or if it is severe and watery.  This medicine can make you more sensitive to the sun. Keep out of the sun. If you cannot avoid being in the sun, wear protective clothing and use a sunscreen. Do not use sun lamps or tanning beds/booths.  What side effects may I notice from receiving this medicine?  Side effects that you should report to your doctor or health care professional as soon as possible:  · allergic reactions like skin rash or hives, swelling of the face, lips, or tongue  · breathing problems  · fever or chills, sore throat  · irregular heartbeat, chest pain  · joint or muscle pain  · pain or difficulty passing urine  · red pinpoint spots on skin  · redness, blistering, peeling or loosening of the skin, including inside the mouth  · unusual bleeding or bruising  · unusually weak or tired  · yellowing of the eyes or skin  Side effects that usually do not require medical attention (report to your doctor or health care professional if they continue or are bothersome):  · diarrhea  · dizziness  · headache  · loss of appetite  · nausea, vomiting  · nervousness  This list may not describe all possible side effects. Call your doctor for medical advice about  side effects. You may report side effects to FDA at 5-651-AXL-7700.  Where should I keep my medicine?  Keep out of the reach of children.  Store at room temperature between 20 to 25 degrees C (68 to 77 degrees F). Protect from light. Throw away any unused medicine after the expiration date.  NOTE: This sheet is a summary. It may not cover all possible information. If you have questions about this medicine, talk to your doctor, pharmacist, or health care provider.  © 2020 [employer]/Gold Standard (2014-07-25 14:38:26)       · Is patient discharged on Warfarin / Coumadin?   No     Depression / Suicide Risk    As you are discharged from this [facility] facility, it is important to learn how to keep safe from harming yourself.    Recognize the warning signs:  · Abrupt changes in personality, positive or negative- including increase in energy   · Giving away possessions  · Change in eating patterns- significant weight changes-  positive or negative  · Change in sleeping patterns- unable to sleep or sleeping all the time   · Unwillingness or inability to communicate  · Depression  · Unusual sadness, discouragement and loneliness  · Talk of wanting to die  · Neglect of personal appearance   · Rebelliousness- reckless behavior  · Withdrawal from people/activities they love  · Confusion- inability to concentrate     If you or a loved one observes any of these behaviors or has concerns about self-harm, here's what you can do:  · Talk about it- your feelings and reasons for harming yourself  · Remove any means that you might use to hurt yourself (examples: pills, rope, extension cords, firearm)  · Get professional help from the community (Mental Health, Substance Abuse, psychological counseling)  · Do not be alone:Call your Safe Contact- someone whom you trust who will be there for you.  · Call your local CRISIS HOTLINE 186-9964 or 295-047-0195  · Call your local Children's Mobile Crisis Response Team [facility] (965)  869-4158 or www.[employer].Studentgems  · Call the toll free National Suicide Prevention Hotlines   · National Suicide Prevention Lifeline 878-141-AOYT (6001)  · National [employer] Line Network 800-SUICIDE (629-8489)

## 2020-10-12 NOTE — ASSESSMENT & PLAN NOTE
-With worsening symptoms, although she is not septic.  Urine culture from 10/8 growing ESBL Klebsiella pneumoniae, and unfortunately she has allergies to antibiotics that the organism is sensitive to.  -I will start her on IV meropenem.  She would likely need to complete the course of the IV antibiotics in the hospital.  Consider ID consult in the morning.  -Her suprapubic catheter will be changed.  -Pain control with oral oxycodone, and IV morphine.  -Monitor closely for sepsis.  Trend WBC count, watch for fevers.  I will place her on IV LR at 125 cc/h.  Close hemodynamic monitoring.

## 2020-10-12 NOTE — ASSESSMENT & PLAN NOTE
- Body mass index is 42.56 kg/m²..  - Counseled on weight loss.  - outpatient referral for outpatient weight management.

## 2020-10-12 NOTE — ED TRIAGE NOTES
"This pt was seen in our department 3 days ago with a hx of f transverse myelitis and incontinence that resulted in a suprapubic catheter  and evaluated for decreased urine output.  She returns today complaining of diffuse abdominal pain with associated episodic N/V , and lightheadedness recurring for the past 3 days.   Chief Complaint   Patient presents with   • Abdominal Pain   • N/V   • Lightheadedness     /69   Pulse 73   Temp 36.9 °C (98.4 °F) (Temporal)   Resp 16   Ht 1.651 m (5' 5\")   Wt 116 kg (255 lb 11.7 oz)   SpO2 96%   BMI 42.56 kg/m²     "

## 2020-10-12 NOTE — DISCHARGE PLANNING
RN CM called pt regarding HH. Pt states she's been on service with [facility], [facility], and [facility].  She does not have a preference. HH choice with [facility] selected, faxed to [facility].

## 2020-10-12 NOTE — PROGRESS NOTES
2 RN skin check complete. Skin not WDL; bruise to bilateral upper extremities, scattered red scabbed ulcers to bilateral breasts, suprapubic catheter in place, gauze dressing CDI. Bruise to LLE, redness to L inner thigh from previous statlock placement.

## 2020-10-12 NOTE — CARE PLAN
Problem: Communication  Goal: The ability to communicate needs accurately and effectively will improve  Outcome: PROGRESSING AS EXPECTED  -->Pt is able to communicate needs effectively with staff, states needs and asks questions appropriately     Problem: Safety  Goal: Will remain free from injury  Outcome: PROGRESSING AS EXPECTED  Goal: Will remain free from falls  Outcome: PROGRESSING AS EXPECTED   --> Pt uses call light appropriately, calls when in need of staff assistance and before exiting bed  --> Bed locked and in lowest position

## 2020-10-12 NOTE — ED PROVIDER NOTES
ED Provider Note    CHIEF COMPLAINT  Chief Complaint   Patient presents with   • Abdominal Pain   • N/V   • Lightheadedness       HPI  Kristin Balderrama is a 29 y.o. female who presents chills, nausea, low abdominal pain and cramping.  Patient states that she has a suprapubic catheter secondary to history of transverse myelitis.  She states that she was seen here on the eighth 3 days ago.  At that time was having more pelvic pain cramping.  Patient was not discharged on any antibiotics.  Patient received a phone call today from representative stating that her urine culture appeared to be growing out ESBL which she has had before.  Given her worsening symptoms patient states that she has had shaking chills, slight confusion and now having flank pain on the right.  She states that the decreased urination that she was having 3 days ago has resolved.  She has had one episode of diarrhea.  She has had nausea but no vomiting.  Patient denies any chest pain or shortness of breath, cough, sore throat, headache.    REVIEW OF SYSTEMS  Pertinent positives include chills, nausea. Pertinent negatives include as above. All other systems negative.    PAST MEDICAL HISTORY   has a past medical history of Abdominal pain, Anginal syndrome, Apnea, sleep, Arrhythmia, Arthritis, ASTHMA, Atrial fibrillation ([facility]), Back pain, Borderline personality disorder ([facility]), Breath shortness, Bronchitis, Cardiac arrhythmia, Chickenpox, Chronic UTI (9/18/2010), Cough, Daytime sleepiness, Depression, Diabetes (HCC), Diarrhea, Disorder of thyroid, Fall, Fatigue, Frequent headaches, Gasping for breath, Gynecological disorder, Headache(784.0), Heart burn, History of falling, Hypertension, Indigestion, Migraine, 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[facility]), 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    SOCIAL HISTORY  Social History     Tobacco Use   • Smoking status: Never Smoker   • Smokeless tobacco: Never Used   Substance and Sexual Activity   • Alcohol use: No     Alcohol/week: 0.0 oz     Frequency: Never     Binge frequency: Never   • Drug use: Not Currently     Frequency: 7.0 times per week     Types: Marijuana   • Sexual activity: Not Currently     Birth control/protection: Pill       SURGICAL HISTORY   has a past surgical history that includes neuro dest facet l/s w/ig sngl (4/21/2015); recovery (1/27/2016); [place] by laparoscopy (8/29/2010); lumbar fusion anterior (8/21/2012); other cardiac surgery (1/2016); tonsillectomy; bowel stimulator insertion (7/13/2016); gastroscopy with balloon dilatation (N/A, 1/4/2017); muscle biopsy (Right, 1/26/2017); other abdominal surgery; and laminotomy.    CURRENT MEDICATIONS  Home Medications     Reviewed by Coleen Argueta R.N. (Registered Nurse) on 10/11/20 at 2123  Med List Status: Complete   Medication Last Dose Status   acetaZOLAMIDE SR (DIAMOX) 500 MG CAPSULE SR 12 HR 10/11/2020 Active   albuterol 108 (90 Base) MCG/ACT Aero Soln inhalation aerosol  Active   aspirin EC (ECOTRIN) 81 MG Tablet Delayed Response 10/11/2020 Active   busPIRone (BUSPAR) 10 MG Tab tablet 10/11/2020 Active   calcium carbonate (TUMS EX) 750 MG chewable tablet 10/11/2020 Active   Dulaglutide (TRULICITY) 1.5 MG/0.5ML Solution Pen-injector 10/9/2020 Active   etonogestrel (NEXPLANON) 68 MG Implant implant  Active   fluoxetine (PROZAC) 40 MG capsule 10/11/2020 Active   furosemide (LASIX) 80 MG Tab  Active   gabapentin (NEURONTIN) 300 MG Cap 10/11/2020 Active   insulin glargine (LANTUS) 100 UNIT/ML Solution 10/10/2020 Active   ivabradine (CORLANOR) 7.5 MG Tab tablet 10/10/2020 Active  "  levothyroxine (SYNTHROID) 75 MCG Tab 10/11/2020 Active   lidocaine (XYLOCAINE) 2 % Solution 10/11/2020 Active   Melatonin 10 MG Tab 10/10/2020 Active   methocarbamol (ROBAXIN) 750 MG Tab 10/11/2020 Active   Mirabegron ER (MYRBETRIQ) 50 MG TABLET SR 24 HR 10/11/2020 Active   mycophenolate (CELLCEPT) 500 MG tablet 10/11/2020 Active   nitrofurantoin (MACRODANTIN) 50 MG Cap  Active   omeprazole (PRILOSEC) 20 MG delayed-release capsule 10/11/2020 Active   ondansetron (ZOFRAN ODT) 4 MG TABLET DISPERSIBLE 10/11/2020 Active   OXcarbazepine (TRILEPTAL) 150 MG Tab 10/11/2020 Active   oxybutynin (DITROPAN) 5 MG Tab 10/11/2020 Active   potassium chloride SA (KDUR) 20 MEQ Tab CR 10/11/2020 Active   predniSONE (DELTASONE) 10 MG Tab 10/11/2020 Active   pregabalin (LYRICA) 300 MG capsule 10/11/2020 Active   sodium bicarbonate (SODIUM BICARBONATE) 650 MG Tab 10/11/2020 Active   topiramate (TOPAMAX) 50 MG tablet 10/11/2020 Active   traZODone (DESYREL) 100 MG Tab 10/10/2020 Active   vitamin D, Ergocalciferol, (DRISDOL) 1.25 MG (90457 UT) Cap capsule 10/11/2020 Active   ziprasidone (GEODON) 40 MG Cap 10/11/2020 Active                ALLERGIES  Allergies   Allergen Reactions   • Depakote [Divalproex Sodium] Unspecified     Muscle spasms/muscle pain and weakness     • Amitriptyline Unspecified     Headaches     • Aripiprazole [Abilify] Unspecified     Headaches/muscle twitching     • Clindamycin Nausea     Even with food     • Flagyl [Metronidazole Hcl] Unspecified     \"eye problems\"     • Flomax [Tamsulosin Hydrochloride] Swelling   • Levaquin Unspecified     Severe muscle cramps in legs  RXN=unknown   • Metformin Unspecified     Increased lactic acid      • Tape Rash     Tears skin off  coban with Tegaderm tape ok intermittently  RXN=ongoing   • Vancomycin Itching     Pt becomes flushed in face and chest.   RXN=7/10/16   • Wound Dressing Adhesive Hives     By pt report   • Ciprofloxacin    • Hydromorphone Hcl      Pt states she feels " "loopy   • Keflex Rash     Pt states she gets a rash with this medication  Tolerates ceftriaxone   • Levofloxacin Unspecified     Leg muscle cramps   • Metronidazole Rash     .   • Penicillins    • Tamsulosin    • Valproic Acid Rash     .       PHYSICAL EXAM  VITAL SIGNS: /55   Pulse (!) 57   Temp 36.4 °C (97.6 °F) (Oral)   Resp 18   Ht 1.651 m (5' 5\")   Wt 115.7 kg (255 lb)   SpO2 98%   BMI 42.43 kg/m²   Constitutional: Well developed, Well nourished, mild distress.   HENT: Normocephalic, Atraumatic, mask in place.   Eyes: Conjunctiva normal, No discharge.   Cardiovascular: Normal heart rate, Normal rhythm, No murmurs, equal pulses.   Pulmonary: Normal breath sounds, No respiratory distress, No wheezing, No rales, No rhonchi.  Chest: No chest wall tenderness or deformity.   Abdomen:Soft, mild suprapubic tenderness, suprapubic catheter,no erythema or warmth around it., No masses, no rebound, no guarding.   Back: Right-sided CVA tenderness  Musculoskeletal: No major deformities noted, No tenderness.   Skin: Warm, Dry, No erythema, No rash.   Neurologic: Alert & oriented x 3, Normal motor function,  No focal deficits noted.   Psychiatric: Affect normal, Judgment normal, Mood normal.       Laboratory tests  Results for orders placed or performed during the hospital encounter of 10/11/20   CBC WITH DIFFERENTIAL   Result Value Ref Range    WBC 6.0 4.8 - 10.8 K/uL    RBC 4.48 4.20 - 5.40 M/uL    Hemoglobin 12.7 12.0 - 16.0 g/dL    Hematocrit 40.8 37.0 - 47.0 %    MCV 91.1 81.4 - 97.8 fL    MCH 28.3 27.0 - 33.0 pg    MCHC 31.1 (L) 33.6 - 35.0 g/dL    RDW 46.4 35.9 - 50.0 fL    Platelet Count 177 164 - 446 K/uL    MPV 12.3 9.0 - 12.9 fL    Neutrophils-Polys 78.50 (H) 44.00 - 72.00 %    Lymphocytes 14.40 (L) 22.00 - 41.00 %    Monocytes 5.00 0.00 - 13.40 %    Eosinophils 1.30 0.00 - 6.90 %    Basophils 0.50 0.00 - 1.80 %    Immature Granulocytes 0.30 0.00 - 0.90 %    Nucleated RBC 0.00 /100 WBC    Neutrophils " (Absolute) 4.70 2.00 - 7.15 K/uL    Lymphs (Absolute) 0.86 (L) 1.00 - 4.80 K/uL    Monos (Absolute) 0.30 0.00 - 0.85 K/uL    Eos (Absolute) 0.08 0.00 - 0.51 K/uL    Baso (Absolute) 0.03 0.00 - 0.12 K/uL    Immature Granulocytes (abs) 0.02 0.00 - 0.11 K/uL    NRBC (Absolute) 0.00 K/uL   COMP METABOLIC PANEL   Result Value Ref Range    Sodium 144 135 - 145 mmol/L    Potassium 4.1 3.6 - 5.5 mmol/L    Chloride 121 (H) 96 - 112 mmol/L    Co2 13 (L) 20 - 33 mmol/L    Anion Gap 10.0 7.0 - 16.0    Glucose 190 (H) 65 - 99 mg/dL    Bun 8 8 - 22 mg/dL    Creatinine 0.77 0.50 - 1.40 mg/dL    Calcium 8.7 8.4 - 10.2 mg/dL    AST(SGOT) 9 (L) 12 - 45 U/L    ALT(SGPT) 19 2 - 50 U/L    Alkaline Phosphatase 81 30 - 99 U/L    Total Bilirubin <0.2 0.1 - 1.5 mg/dL    Albumin 4.1 3.2 - 4.9 g/dL    Total Protein 6.1 6.0 - 8.2 g/dL    Globulin 2.0 1.9 - 3.5 g/dL    A-G Ratio 2.1 g/dL   LIPASE   Result Value Ref Range    Lipase 47 7 - 58 U/L   HCG QUAL SERUM   Result Value Ref Range    Beta-Hcg Qualitative Serum Negative Negative   LACTIC ACID   Result Value Ref Range    Lactic Acid 1.7 0.5 - 2.0 mmol/L   ESTIMATED GFR   Result Value Ref Range    GFR If African American >60 >60 mL/min/1.73 m 2    GFR If Non African American >60 >60 mL/min/1.73 m 2   Routine (COVID/SARS COV-2 In-House PCR up to 24 hours)    Specimen: Nasopharyngeal; Respirate   Result Value Ref Range    COVID Order Status Received    Magnesium   Result Value Ref Range    Magnesium 2.0 1.5 - 2.5 mg/dL   ACCU-CHEK GLUCOSE   Result Value Ref Range    Glucose - Accu-Ck 105 (H) 65 - 99 mg/dL     *Note: Due to a large number of results and/or encounters for the requested time period, some results have not been displayed. A complete set of results can be found in Results Review.         Medications given in the ER  Medications   meropenem (MERREM) 500 mg in  mL IVPB (has no administration in time range)   albuterol inhaler 2 Puff (has no administration in time range)   aspirin  EC (ECOTRIN) tablet 81 mg (has no administration in time range)   busPIRone (BUSPAR) tablet 10 mg (10 mg Oral Given 10/11/20 2228)   FLUoxetine (PROZAC) capsule 40 mg (has no administration in time range)   gabapentin (NEURONTIN) capsule 300 mg (300 mg Oral Given 10/11/20 2229)   insulin glargine (Lantus) injection (0 Units Subcutaneous Refused 10/11/20 2000)   methocarbamol (ROBAXIN) tablet 1,500 mg (1,500 mg Oral Given 10/11/20 2227)   mycophenolate (CELLCEPT) capsule 1,000 mg (1,000 mg Oral Given 10/11/20 2228)   omeprazole (PRILOSEC) capsule 20 mg (has no administration in time range)   OXcarbazepine (TRILEPTAL) tablet 150 mg (150 mg Oral Given 10/11/20 2229)   oxybutynin (DITROPAN) tablet 5 mg (5 mg Oral Given 10/11/20 2229)   levothyroxine (SYNTHROID) tablet 75 mcg (has no administration in time range)   potassium chloride SA (Kdur) tablet 20 mEq (20 mEq Oral Given 10/11/20 2230)   predniSONE (DELTASONE) tablet 10 mg (has no administration in time range)   pregabalin (LYRICA) capsule 300 mg (has no administration in time range)   sodium bicarbonate tablet 1,950 mg (1,950 mg Oral Given 10/11/20 2227)   topiramate (TOPAMAX) tablet 50 mg (50 mg Oral Given 10/11/20 2229)   traZODone (DESYREL) tablet 100 mg (100 mg Oral Given 10/11/20 2229)   vitamin D (Ergocalciferol) (DRISDOL) capsule 50,000 Units (has no administration in time range)   ziprasidone (GEODON) capsule 40 mg (40 mg Oral Given 10/11/20 2230)   Respiratory Therapy Consult (has no administration in time range)   acetaminophen (TYLENOL) tablet 650 mg (has no administration in time range)   cloNIDine (CATAPRES) tablet 0.1 mg (has no administration in time range)   enalaprilat (VASOTEC) injection 1.25 mg (has no administration in time range)   labetalol (NORMODYNE/TRANDATE) injection 10 mg (has no administration in time range)   ondansetron (ZOFRAN) syringe/vial injection 4 mg (has no administration in time range)   ondansetron (ZOFRAN ODT) dispertab 4 mg  (has no administration in time range)   promethazine (PHENERGAN) tablet 12.5-25 mg (has no administration in time range)   promethazine (PHENERGAN) suppository 12.5-25 mg (has no administration in time range)   prochlorperazine (COMPAZINE) injection 5-10 mg (has no administration in time range)   senna-docusate (PERICOLACE or SENOKOT S) 8.6-50 MG per tablet 2 Tab (2 Tabs Oral Refused 10/11/20 2000)     And   polyethylene glycol/lytes (MIRALAX) PACKET 1 Packet (has no administration in time range)     And   magnesium hydroxide (MILK OF MAGNESIA) suspension 30 mL (has no administration in time range)     And   bisacodyl (DULCOLAX) suppository 10 mg (has no administration in time range)   lactated ringers infusion ( Intravenous New Bag 10/11/20 2127)   enoxaparin (LOVENOX) inj 40 mg (has no administration in time range)   Pharmacy Consult Request ...Pain Management Review 1 Each (has no administration in time range)     And   oxyCODONE immediate-release (ROXICODONE) tablet 5 mg (5 mg Oral Given 10/11/20 2251)     And   oxyCODONE immediate-release (ROXICODONE) tablet 10 mg (has no administration in time range)     And   morphine (pf) 4 mg/mL injection 4 mg (has no administration in time range)   insulin regular (HumuLIN R,NovoLIN R) injection (0 Units Subcutaneous Dose not Required 10/11/20 2100)     And   glucose 4 g chewable tablet 16 g (has no administration in time range)     And   dextrose 50% (D50W) injection 50 mL (has no administration in time range)   MEROPENEM 1 G IV SOLR (has no administration in time range)   SODIUM CHLORIDE 0.9 % IV SOLN (has no administration in time range)   acetaZOLAMIDE (DIAMOX) tablet 1,500 mg (has no administration in time range)   acetaZOLAMIDE (DIAMOX) tablet 1,000 mg (1,000 mg Oral Given 10/11/20 2231)   NS infusion 1,000 mL (0 mL Intravenous Infusion Ended Prior to Shift 10/11/20 2015)   meropenem (MERREM) 500 mg in  mL IVPB (0 mg Intravenous Stopped 10/11/20 2102)    morphine (pf) 4 mg/mL injection 4 mg (4 mg Intravenous Given 10/11/20 1924)   ondansetron (ZOFRAN) syringe/vial injection 4 mg (4 mg Intravenous Given 10/11/20 1924)       COURSE & MEDICAL DECISION MAKING  Pertinent Labs & Imaging studies reviewed. (See chart for details)  Differential includes pyelonephritis, resistant ESBL urinary tract infection, sepsis,    HYDRATION: Based on the patient's presentation of Other Possible sepsis the patient was given IV fluids. IV Hydration was used because oral hydration was not as rapid as required. Upon recheck following hydration, the patient was Felt slightly better.      I verified that the patient was wearing a mask and I was wearing appropriate PPE every time I entered the room. The patient's mask was on the patient at all times during my encounter except for a brief view of the oropharynx.    Given the patient's ESBL urinary tract infection with now having flank pain I am concerned that the patient is developing pyelonephritis.  Given the patient's multiple allergies as well as the resistance of this bacteria it is felt that the patient would benefit from IV antibiotics.  After discussion with pharmacy has recommended patient be started on meropenem.    Discussed the case with Dr. Barnard hospitalist who is agreed to consult for hospitalization      Medical decision making: I think the patient most likely has pyelonephritis secondary to an ESBL Klebsiella.  Because of her allergies and significant resistance she will need to be brought into the hospital for IV antibiotics.  Patient does not appear to be septic at this point time.  Her abdomen is otherwise nonsurgical I do not think she needs any imaging.        DISPOSITION:  Patient will be hospitalized by Dr. Barnard in guarded condition.      FINAL IMPRESSION  1. Pyelonephritis    2. ESBL (extended spectrum beta-lactamase) producing bacteria infection               Electronically signed by: Glynn Hinds M.D.,  10/11/2020 5:58 PM   This record was made with a voice recognition software. The software is not perfect. I have tried to correct any grammar, spelling or context errors to the best of my ability, but errors may still remain. Interpretation of this chart should be taken in this context.

## 2020-10-12 NOTE — DIETARY
NUTRITION SERVICES: BMI - Pt with BMI >40 (=Body mass index is 42.43 kg/m².), morbid obesity. Weight loss counseling not appropriate in acute care setting. Pt with >stage 3 pressure ulcer per admit nutrition screen. Per 2 RN skin check, no pressure ulcer noted. Pt with scattered red scabbed ulcers on bilateral breasts and bruises. Nutrition assessment not indicated at this time. RECOMMEND - Referral to outpatient nutrition services for weight management after D/C.

## 2020-10-12 NOTE — DISCHARGE SUMMARY
Discharge Summary    CHIEF COMPLAINT ON ADMISSION  Chief Complaint   Patient presents with   • Abdominal Pain   • N/V   • Lightheadedness       Reason for Admission  Abdominal pain     Admission Date  10/11/2020    CODE STATUS  Full Code    HPI & HOSPITAL COURSE  Ms. Balderrama is a 29 y.o. female with significant psychiatric history (bipolar disorder, schizophrenia), with insulin dependent diabetes mellitus, and known transverse myelitis with suprapubic catheter in place, and history of ESBL UTI, who was initially seen on 10/8/2020 in the ED with left flank pain, and diminished urine output from the suprapubic catheter, along with intermittent hematuria.  CT scan at that time did not show any pyelonephritis or obstruction.  Apparently she was not sent home on any antibiotics then.  On the following days, she continued to have progressively worsening symptoms of increased left flank pain, along with suprapubic pain described as sharp, rated 9 out of 10 in severity, with no radiation.  She also noticed purulent discharge from the suprapubic insertion site.  She had chills, but had no documented fevers.  She had some nausea.  She continued to have intermittent hematuria.  She had no bowel movement changes, chest pain, shortness of breath.  Due to her persistent and progressive symptoms, she then decided to go to the ED today.    Infectious disease (Dr. Lou Calderón) was consulted - noted UCx 10/8/2020 and UCx 10/9/2020 both had same ESBL Klebsiella sensitive to Bactrim. Patient received 2-doses of Meropenem 500mg this admission. Per ID, patient will be discharged on 10-days of PO Bactrim DS. At time of my evaluation, pt appears comfortable, no acute distress, no leukocytosis, afebrile. Suprapubic catheter has already been exchanged. Home health arrangement made to assist with suprapubic cath care.    Therefore, she is discharged in good and stable condition to home with close outpatient follow-up.    The patient  "recovered much more quickly than anticipated on admission.    Discharge Date  10/12/2020    FOLLOW UP ITEMS POST DISCHARGE  ESBL UTI in setting of chronic suprapubic catheter - suprapubic catheter exchanged at ED arrival - f/u with PCP  Transverse myelitis dependent on chronic suprapubic catheter  Insulin dependent diabetes  Mood disorders - schizophrenia, depression, anxiety  Borderline personality  Class III morbid obesity      DISCHARGE DIAGNOSES  Principal Problem:    ESBL Klebsiella UTI (urinary tract infection) due to urinary indwelling catheter ([facility]) POA: Yes  Active Problems:    Borderline personality disorder in adult ([facility]) POA: Yes      Overview: 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      \"per patients grandmother\"    Morbidly obese ([facility]) POA: Yes      Overview: IMO load March 2020    Schizophrenia ([facility]) POA: Yes    Type 2 diabetes mellitus without complication, with long-term current use of insulin ([facility]) POA: Yes  Resolved Problems:    * No resolved hospital problems. *      FOLLOW UP  Future Appointments   Date Time Provider Department Center   10/15/2020 11:15 AM LAB [facility] None   11/12/2020 10:40 AM John Leon M.D. CB None     Torres Brody M.D.  [address] [place] [address] [id number]  [facility] [id number]  [phone]    In 1 week        MEDICATIONS ON DISCHARGE     Medication List      START taking these medications      Instructions   sulfamethoxazole-trimethoprim 800-160 MG tablet  Commonly known as: BACTRIM DS   Take 1 Tab by mouth 2 times a day for 10 days.  Dose: 1 Tab        CHANGE how you take these medications      Instructions   insulin glargine 100 UNIT/ML Soln  What changed: how much to take  Commonly known as: Lantus   Inject 8 Units as instructed every evening.  Dose: 8 Units     OXcarbazepine 150 MG Tabs  What changed:   · when to take this  · additional instructions  Commonly known as: TRILEPTAL   TAKE ONE TABLET BY MOUTH TWICE DAILY      "   CONTINUE taking these medications      Instructions   acetaZOLAMIDE  MG Cp12  Commonly known as: DIAMOX   Take 1,000-1,500 mg by mouth 2 times a day. 1500mg in AM  1000mg at PM  Dose: 1,000-1,500 mg     albuterol 108 (90 Base) MCG/ACT Aers inhalation aerosol   Inhale 2 Puffs by mouth every 6 hours as needed for Shortness of Breath.  Dose: 2 Puff     aspirin EC 81 MG Tbec  Commonly known as: ECOTRIN   Take 81 mg by mouth every day.  Dose: 81 mg     busPIRone 10 MG Tabs tablet  Commonly known as: BUSPAR   Take 10 mg by mouth 2 times a day.  Dose: 10 mg     calcium carbonate 750 MG chewable tablet  Commonly known as: TUMS EX   Take 2 Tabs by mouth every day.  Dose: 1,500 mg     CellCept 500 MG tablet  Generic drug: mycophenolate   Take 1,000 mg by mouth 2 times a day.  Dose: 1,000 mg     Corlanor 7.5 MG Tabs tablet  Generic drug: ivabradine   Take 7.5 mg by mouth 2 times a day, with meals.  Dose: 7.5 mg     fluoxetine 40 MG capsule  Commonly known as: PROZAC   Take 1 Cap by mouth every day.  Dose: 40 mg     furosemide 80 MG Tabs  Commonly known as: LASIX   Take 80 mg by mouth 1 time daily as needed.  Dose: 80 mg     gabapentin 300 MG Caps  Commonly known as: NEURONTIN   Take 300 mg by mouth every bedtime.  Dose: 300 mg     levothyroxine 75 MCG Tabs  Commonly known as: SYNTHROID   Take 1 Tab by mouth Every morning on an empty stomach.  Dose: 75 mcg     lidocaine 2 % Soln  Commonly known as: XYLOCAINE   Take 5 mL by mouth as needed for Throat/Mouth Pain (q6hr PRN throat pain, ok to rinse and spit or swallow).  Dose: 5 mL     Melatonin 10 MG Tabs   Take 10 mg by mouth every evening.  Dose: 10 mg     Myrbetriq 50 MG Tb24  Generic drug: Mirabegron ER   Take 50 mg by mouth every day.  Dose: 50 mg     Nexplanon 68 MG Impl implant  Generic drug: etonogestrel   Inject 1 Each as instructed Once.  Dose: 1 Each     omeprazole 20 MG delayed-release capsule  Commonly known as: PRILOSEC   Take 1 Cap by mouth every day.  Dose:  "20 mg     ondansetron 4 MG Tbdp  Commonly known as: ZOFRAN ODT   Take 4 mg by mouth every 6 hours as needed for Nausea.  Dose: 4 mg     oxybutynin 5 MG Tabs  Commonly known as: DITROPAN   Take 1 Tab by mouth 3 times a day.  Dose: 5 mg     potassium chloride SA 20 MEQ Tbcr  Commonly known as: Kdur   Take 20 mEq by mouth 2 times a day.  Dose: 20 mEq     predniSONE 10 MG Tabs  Commonly known as: DELTASONE   Take 10 mg by mouth every day. Maintenance Medication.  Dose: 10 mg     sodium bicarbonate 650 MG Tabs  Commonly known as: SODIUM BICARBONATE   Take 1,950 mg by mouth 2 times a day.  Dose: 1,950 mg     topiramate 50 MG tablet  Commonly known as: TOPAMAX   Take 1 Tab by mouth 2 Times a Day.  Dose: 50 mg     traZODone 100 MG Tabs  Commonly known as: DESYREL   Take 1 Tab by mouth every bedtime.  Dose: 100 mg     Trulicity 1.5 MG/0.5ML Sopn  Generic drug: Dulaglutide   Inject 0.5 mL as instructed every Friday.  Dose: 0.5 mL     vitamin D (Ergocalciferol) 1.25 MG (63587 UT) Caps capsule  Commonly known as: DRISDOL   Take 50,000 Units by mouth every 7 days.  Dose: 50,000 Units     ziprasidone 40 MG Caps  Commonly known as: GEODON   Take 1 Cap by mouth 2 Times a Day.  Dose: 40 mg        STOP taking these medications    Lyrica 300 MG capsule  Generic drug: pregabalin     methocarbamol 750 MG Tabs  Commonly known as: ROBAXIN     nitrofurantoin 50 MG Caps  Commonly known as: MACRODANTIN            Allergies  Allergies   Allergen Reactions   • Depakote [Divalproex Sodium] Unspecified     Muscle spasms/muscle pain and weakness     • Amitriptyline Unspecified     Headaches     • Aripiprazole [Abilify] Unspecified     Headaches/muscle twitching     • Clindamycin Nausea     Even with food     • Flagyl [Metronidazole Hcl] Unspecified     \"eye problems\"     • Flomax [Tamsulosin Hydrochloride] Swelling   • Levaquin Unspecified     Severe muscle cramps in legs  RXN=unknown   • Metformin Unspecified     Increased lactic acid      • " Tape Rash     Tears skin off  coban with Tegaderm tape ok intermittently  RXN=ongoing   • Vancomycin Itching     Pt becomes flushed in face and chest.   RXN=7/10/16   • Wound Dressing Adhesive Hives     By pt report   • Ciprofloxacin    • Hydromorphone Hcl      Pt states she feels loopy   • Keflex Rash     Pt states she gets a rash with this medication  Tolerates ceftriaxone   • Levofloxacin Unspecified     Leg muscle cramps   • Metronidazole Rash     .   • Penicillins    • Tamsulosin    • Valproic Acid Rash     .       DIET  Orders Placed This Encounter   Procedures   • Diet Order Diabetic     Standing Status:   Standing     Number of Occurrences:   1     Order Specific Question:   Diet:     Answer:   Diabetic [3]       ACTIVITY  As tolerated.  Weight bearing as tolerated    CONSULTATIONS  Infectious Disease (Dr. Lou Calderón)    PROCEDURES  None    LABORATORY  Lab Results   Component Value Date    SODIUM 143 10/12/2020    POTASSIUM 4.0 10/12/2020    CHLORIDE 119 (H) 10/12/2020    CO2 15 (L) 10/12/2020    GLUCOSE 103 (H) 10/12/2020    BUN 6 (L) 10/12/2020    CREATININE 0.55 10/12/2020    CREATININE 0.75 (L) 07/20/2010    GLOMRATE >59 07/20/2010        Lab Results   Component Value Date    WBC 5.0 10/12/2020    WBC 6.1 07/20/2010    HEMOGLOBIN 11.4 (L) 10/12/2020    HEMATOCRIT 37.9 10/12/2020    PLATELETCT 161 (L) 10/12/2020        Total time of the discharge process exceeds 34 minutes.

## 2020-10-13 SDOH — ECONOMIC STABILITY: INCOME INSECURITY: HOW HARD IS IT FOR YOU TO PAY FOR THE VERY BASICS LIKE FOOD, HOUSING, MEDICAL CARE, AND HEATING?: NOT HARD AT ALL

## 2020-10-13 NOTE — PROGRESS NOTES
10/13/20- DALY Kilgore contacted pt via home phone to introduce CCM services. Completed SDOH screening and outpatient assessment. Pt states she lives at home with her grandparents and has a great support system. She is established with a PCP and has follow up appointment scheduled for 10/14. Pt is also scheduled to receive home health. Kristin declined the need for CCM services at this time. She denies need for assistance with transportation , food and housing at this time. She also stated she uses walker, wheelchair, hospital bed and other forms of medical equipment at home. Reviewed AVS with pt. She did not have any questions at this time and pt was encouraged to contact DALY Kilgore for any future questions. She thanked me for the call.     Community Health Worker Intake  • Social determinates of health intake completed   • Identified barriers to none.   • Contact information provided to Kristin Balderrama. Yes   • Has PCP appointment scheduled for: 10/14/20  • Scheduled Food Delivery/Home Visit/Outpatient Visit:  Declined   • Accepted/Declined Med's-To-Beds. NA  • Inpatient/Outpatient assessment completed. Out patient    Did the patient receive medications post discharge: NA    Plan:  D/c pt from CCM services at all pt needs were met.

## 2020-10-20 ENCOUNTER — APPOINTMENT (OUTPATIENT)
Dept: RADIOLOGY | Facility: MEDICAL CENTER | Age: 30
End: 2020-10-20
Attending: EMERGENCY MEDICINE
Payer: MEDICARE

## 2020-10-20 ENCOUNTER — HOSPITAL ENCOUNTER (EMERGENCY)
Facility: MEDICAL CENTER | Age: 30
End: 2020-10-20
Attending: EMERGENCY MEDICINE
Payer: MEDICARE

## 2020-10-20 VITALS
TEMPERATURE: 98.1 F | HEART RATE: 63 BPM | DIASTOLIC BLOOD PRESSURE: 62 MMHG | RESPIRATION RATE: 14 BRPM | HEIGHT: 65 IN | SYSTOLIC BLOOD PRESSURE: 116 MMHG | OXYGEN SATURATION: 97 % | BODY MASS INDEX: 42.49 KG/M2 | WEIGHT: 255 LBS

## 2020-10-20 DIAGNOSIS — N83.201 RIGHT OVARIAN CYST: ICD-10-CM

## 2020-10-20 DIAGNOSIS — N39.0 ACUTE UTI: ICD-10-CM

## 2020-10-20 DIAGNOSIS — R56.9 SEIZURE-LIKE ACTIVITY (HCC): ICD-10-CM

## 2020-10-20 LAB
APPEARANCE UR: ABNORMAL
BACTERIA #/AREA URNS HPF: ABNORMAL /HPF
BILIRUB UR QL STRIP.AUTO: NEGATIVE
COLOR UR: YELLOW
EPI CELLS #/AREA URNS HPF: ABNORMAL /HPF
GLUCOSE UR STRIP.AUTO-MCNC: NEGATIVE MG/DL
HCG UR QL: NEGATIVE
KETONES UR STRIP.AUTO-MCNC: NEGATIVE MG/DL
LEUKOCYTE ESTERASE UR QL STRIP.AUTO: ABNORMAL
MICRO URNS: ABNORMAL
MUCOUS THREADS #/AREA URNS HPF: ABNORMAL /HPF
NITRITE UR QL STRIP.AUTO: POSITIVE
PH UR STRIP.AUTO: 7 [PH] (ref 5–8)
PROT UR QL STRIP: NEGATIVE MG/DL
RBC # URNS HPF: ABNORMAL /HPF
RBC UR QL AUTO: NEGATIVE
SP GR UR STRIP.AUTO: 1.02
WBC #/AREA URNS HPF: ABNORMAL /HPF

## 2020-10-20 PROCEDURE — 74176 CT ABD & PELVIS W/O CONTRAST: CPT

## 2020-10-20 PROCEDURE — 73030 X-RAY EXAM OF SHOULDER: CPT | Mod: RT

## 2020-10-20 PROCEDURE — 81025 URINE PREGNANCY TEST: CPT

## 2020-10-20 PROCEDURE — 70450 CT HEAD/BRAIN W/O DYE: CPT

## 2020-10-20 PROCEDURE — 99284 EMERGENCY DEPT VISIT MOD MDM: CPT

## 2020-10-20 PROCEDURE — 81001 URINALYSIS AUTO W/SCOPE: CPT

## 2020-10-20 RX ORDER — TOPIRAMATE 25 MG/1
50 TABLET ORAL 2 TIMES DAILY
Status: SHIPPED | COMMUNITY
End: 2020-12-22

## 2020-10-20 RX ORDER — METHOCARBAMOL 750 MG/1
750 TABLET, FILM COATED ORAL
Status: SHIPPED | COMMUNITY
End: 2021-02-05

## 2020-10-20 RX ORDER — NITROFURANTOIN 25; 75 MG/1; MG/1
100 CAPSULE ORAL 2 TIMES DAILY
Qty: 14 CAP | Refills: 0 | Status: SHIPPED | OUTPATIENT
Start: 2020-10-20 | End: 2020-10-27

## 2020-10-20 ASSESSMENT — FIBROSIS 4 INDEX: FIB4 SCORE: 0.4

## 2020-10-20 NOTE — ED TRIAGE NOTES
"SUSAN STALLWORTH, states she \"thinks she had a seizure this morning\" she also wants her \"infection checked out with suprapubic catheter, requesting a \"tune up\"    "

## 2020-10-20 NOTE — ED NOTES
Pt asked me to call her grandmother (Vivienne) @ [phone] to verify her medications, she is not sure what medications she took today (10/20/2020)  Called grandmother (Vivienne) @ [phone], left message.

## 2020-10-20 NOTE — DISCHARGE INSTRUCTIONS
Your shoulder x-ray is normal.  You may need an MRI in the future to evaluate for soft tissue injury.      We saw a right ovarian cyst measuring 4.6 cm.  This will need to follow-up by a gynecologist.  Please call the [facility] for this.      Your CT head was negative for bleeding.      Discontinue Bactrim and start taking Macrobid  Continue with your other medications as prescribed  Return to the ER for flank pain, vomiting, fever, or other concerns  Call your primary care doctor, Dr. Brody, for recheck tomorrow  Call your neurologist for recheck tomorrow

## 2020-10-20 NOTE — ED PROVIDER NOTES
"ED Provider Note    CHIEF COMPLAINT  Chief Complaint   Patient presents with   • Seizure     \"thinks she has sz this morning\"         HPI  Kristin Balderrama is a 30 y.o. female with transverse myelitis and a seizure disorder who presents complaining of seizure this morning.  She states she feels out of it.  She reports recent head trauma after falling 3 times in the last several days secondary to her transverse myelitis.  She states she called Dr. Ramirez, her neurologist, this morning who recommended she come to the ER for CAT scan given new seizure activity following head trauma.  She also reports that she feels pressure in her bladder.  She feels the Bactrim she has been taking for a recently diagnosed UTI is \"not working.\"    Patient denies fever, chills, vomiting, flank pain, chest pain, shortness of breath, visual changes.      ALLERGIES  Allergies   Allergen Reactions   • Depakote [Divalproex Sodium] Unspecified     Muscle spasms/muscle pain and weakness     • Amitriptyline Unspecified     Headaches     • Aripiprazole [Abilify] Unspecified     Headaches/muscle twitching     • Clindamycin Nausea     Even with food     • Flagyl [Metronidazole Hcl] Unspecified     \"eye problems\"     • Flomax [Tamsulosin Hydrochloride] Swelling   • Levaquin Unspecified     Severe muscle cramps in legs  RXN=unknown   • Metformin Unspecified     Increased lactic acid      • Tape Rash     Tears skin off  coban with Tegaderm tape ok intermittently  RXN=ongoing   • Vancomycin Itching     Pt becomes flushed in face and chest.   RXN=7/10/16   • Wound Dressing Adhesive Hives     By pt report   • Ciprofloxacin    • Hydromorphone Hcl      Pt states she feels loopy   • Keflex Rash     Pt states she gets a rash with this medication  Tolerates ceftriaxone   • Levofloxacin Unspecified     Leg muscle cramps   • Metronidazole Rash     .   • Penicillins Hives   • Tamsulosin Swelling   • Valproic Acid Rash     .       CURRENT " MEDICATIONS  Home Medications     Reviewed by Ulisses Webster (Pharmacy Tech) on 10/20/20 at 1208  Med List Status: Complete   Medication Last Dose Status   acetaZOLAMIDE SR (DIAMOX) 500 MG CAPSULE SR 12 HR 10/20/2020 Active   albuterol 108 (90 Base) MCG/ACT Aero Soln inhalation aerosol > 2 days Active   aspirin EC (ECOTRIN) 81 MG Tablet Delayed Response 10/20/2020 Active   busPIRone (BUSPAR) 10 MG Tab tablet > 3 days Active   calcium carbonate (TUMS EX) 750 MG chewable tablet > 2 weeks Active   Dulaglutide (TRULICITY) 1.5 MG/0.5ML Solution Pen-injector 10/16/2020 Active   fluoxetine (PROZAC) 40 MG capsule 10/20/2020 Active   furosemide (LASIX) 80 MG Tab > 1 week Active   gabapentin (NEURONTIN) 300 MG Cap 10/19/2020 Active   insulin glargine (LANTUS) 100 UNIT/ML Solution 10/19/2020 Active   ivabradine (CORLANOR) 7.5 MG Tab tablet 10/20/2020 Active   levothyroxine (SYNTHROID) 75 MCG Tab 10/20/2020 Active   lidocaine (XYLOCAINE) 2 % Solution > 7 days Active   Melatonin 10 MG Tab 10/19/2020 Active   methocarbamol (ROBAXIN) 750 MG Tab 10/20/2020 Active   Mirabegron ER (MYRBETRIQ) 50 MG TABLET SR 24 HR 10/20/2020 Active   mycophenolate (CELLCEPT) 500 MG tablet 10/20/2020 Active   omeprazole (PRILOSEC) 20 MG delayed-release capsule 10/20/2020 Active   ondansetron (ZOFRAN ODT) 4 MG TABLET DISPERSIBLE > 7 days Active   OXcarbazepine (TRILEPTAL) 150 MG Tab 10/20/2020 Active   oxybutynin (DITROPAN) 5 MG Tab 10/20/2020 Active   potassium chloride SA (KDUR) 20 MEQ Tab CR 10/20/2020 Active   predniSONE (DELTASONE) 10 MG Tab 10/20/2020 Active   sodium bicarbonate (SODIUM BICARBONATE) 650 MG Tab 10/20/2020 Active   sulfamethoxazole-trimethoprim (BACTRIM DS) 800-160 MG tablet 10/20/2020 Active   topiramate (TOPAMAX) 25 MG Tab 10/19/2020 Active   traZODone (DESYREL) 100 MG Tab 10/19/2020 Active   vitamin D, Ergocalciferol, (DRISDOL) 1.25 MG (43518 UT) Cap capsule 10/14/2020 Active   ziprasidone (GEODON) 40 MG Cap > 2 days  Active                PAST MEDICAL HISTORY   has a past medical history of Abdominal pain, Anginal syndrome, Apnea, sleep, Arrhythmia, Arthritis, ASTHMA, Atrial fibrillation (HCC), Back pain, Borderline personality disorder (HCC), Breath shortness, Bronchitis, Cardiac arrhythmia, Chickenpox, Chronic UTI (9/18/2010), Cough, Daytime sleepiness, Depression, Diabetes (HCC), Diarrhea, Disorder of thyroid, Fall, Fatigue, Frequent headaches, Gasping for breath, Gynecological disorder, Headache(784.0), Heart burn, History of falling, Hypertension, Indigestion, Migraine, 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HCC), 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HCC), Nausea, Obesity, Other fatigue (6/29/2020), Pain (08-15-12), Painful joint, PCOS (polycystic ovarian syndrome), Pneumonia (2012), Psychosis ([facility]), Renal disorder, Ringing in ears, Scoliosis, Shortness of breath, Sinus tachycardia (10/31/2013), Sleep apnea, Snoring, Tonsillitis, 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[facility]), Tuberculosis, Urinary bladder disorder, Urinary incontinence, Weakness, and Wears glasses.    SURGICAL HISTORY   has a past surgical history that includes neuro dest facet l/s w/ig sngl (4/21/2015); recovery (1/27/2016); delmar by laparoscopy (8/29/2010); lumbar fusion anterior (8/21/2012); other cardiac surgery (1/2016); tonsillectomy; bowel stimulator insertion (7/13/2016); gastroscopy with balloon dilatation (N/A, 1/4/2017); muscle biopsy (Right, 1/26/2017); other abdominal surgery; and laminotomy.    SOCIAL HISTORY  Social History     Tobacco Use   • Smoking status: Never Smoker   • Smokeless tobacco: Never Used   Substance and Sexual Activity   • Alcohol use: No     Alcohol/week: 0.0 oz     Frequency: Never     Binge frequency: Never   • Drug use: Not Currently     Frequency: 7.0 times per week     Types: Marijuana   • Sexual activity: Not Currently     Birth control/protection: Pill         REVIEW OF SYSTEMS  See HPI for further details.  All other systems are negative except as above in  "HPI.    PHYSICAL EXAM  VITAL SIGNS: /51   Pulse (!) 56   Temp 36.7 °C (98.1 °F) (Temporal)   Resp 14   Ht 1.651 m (5' 5\")   Wt 115.7 kg (255 lb)   SpO2 94%   BMI 42.43 kg/m²     General:  WDobese, nontoxic appearing in NAD; A+Ox3; V/S as above; afebrile  Skin: warm and dry; slightly pale color; no rash  HEENT: NCAT; EOMs intact; PERRL; no scleral icterus   Neck: FROM; no meningismus, soft  Cardiovascular: Regular heart rate and rhythm.  No murmurs, rubs, or gallops; pulses 2+ bilaterally radially and DP areas  Lungs: Clear to auscultation with good air movement bilaterally.  No wheezes, rhonchi, or rales.   Abdomen: BS present; soft; NTND; no rebound, guarding, or rigidity.  No organomegaly or pulsatile mass; suprapubic catheter in place with a small amount of dried drainage at the os; 2 cm area of ecchymosis over the right mid to lower abdomen  Extremities: NEWTON x 4; no e/o trauma; no pedal edema; neg Dhiraj's  Neurologic: CNs III-XII grossly intact; speech clear; distal sensation intact; strength 5/5 UE/LEs  Psychiatric: Appropriate affect, normal mood    LABS  Results for orders placed or performed during the hospital encounter of 10/20/20   BETA-HCG QUALITATIVE URINE   Result Value Ref Range    Beta-Hcg Urine Negative Negative   URINALYSIS    Specimen: Urine   Result Value Ref Range    Color Yellow     Character Cloudy (A)     Specific Gravity 1.025 <1.035    Ph 7.0 5.0 - 8.0    Glucose Negative Negative mg/dL    Ketones Negative Negative mg/dL    Protein Negative Negative mg/dL    Bilirubin Negative Negative    Nitrite Positive (A) Negative    Leukocyte Esterase Moderate (A) Negative    Occult Blood Negative Negative    Micro Urine Req Microscopic    URINE MICROSCOPIC (W/UA)   Result Value Ref Range    WBC  (A) /hpf    RBC 5-10 (A) /hpf    Bacteria Moderate (A) None /hpf    Epithelial Cells Rare Few /hpf    Mucous Threads Moderate /hpf     *Note: Due to a large number of results and/or " encounters for the requested time period, some results have not been displayed. A complete set of results can be found in Results Review.           IMAGING  CT-RENAL COLIC EVALUATION(A/P W/O)   Final Result      1.  4.6 x 2.8 cm right adnexal cyst      2.  Bladder decompressed with a suprapubic catheter      3.  Prior cholecystectomy. No biliary dilation.      4.  Multiple small nonobstructive right renal calculi         CT-HEAD W/O   Final Result      No acute intracranial abnormality      DX-SHOULDER 2+ RIGHT   Final Result      Negative shoulder series.          MEDICAL RECORD  I have reviewed patient's medical record and pertinent results are listed below.      COURSE & MEDICAL DECISION MAKING  I have reviewed any medical record information, laboratory studies and radiographic results as noted.    Kristin Balderrama is a 30 y.o. female with transverse myelitis and a suprapubic catheter currently undergoing treatment for UTI with Bactrim who presents via EMS complaining of a change in her seizure pattern following recent head trauma.  Patient also reports injury to the right shoulder during 1 of these falls.  She has bruising over the right side of the abdomen which she states is also due to the fall.  She is afebrile and nontoxic-appearing with a soft, nontender, nonsurgical abdomen.  No obvious head trauma is noted.  The patient is able to answer my questions and states that her neurologist sent her in for evaluation.    Appropriate PPE was worn at all times while interacting with the patient, including goggles, N95 mask, and surgical mask.    CT brain is negative for acute pathology.    Labs demonstrate a negative hCG with moderate bacteria and pyuria and positive nitrites on urinalysis.  Review of urine culture and sensitivity from several weeks ago demonstrates a pansensitive ESBL Klebsiella.    I consulted the clinical pharmacist, Dr. Thompson, regarding antibiotic choices other than Bactrim for this  patient.  We decided Macrobid, given that she does not have pyelonephritis, would be a good alternative.    Given my low suspicion for pyelonephritis in a patient with a known seizure disorder, I feel the patient can be discharged and treated as an outpatient for continued UTI.  She will follow up with her PCP and neurologist.      FINAL IMPRESSION  1. Seizure-like activity (HCC)     2. Acute UTI     3. Right ovarian cyst         Electronically signed by: Catalina Bull M.D., 10/20/2020 11:19 AM

## 2020-10-20 NOTE — ED NOTES
Spoke w/ pt's mother.  She is at work.  She is going to call her sister to see if she can come take pt home.

## 2020-10-20 NOTE — ED NOTES
Agreeable to discharge.  Follow up instructions discussed and understanding verbalized.  Out of ED via  escoert.  RX x 1 sent electronically.

## 2020-10-20 NOTE — ED NOTES
Med rec updated and complete  Allergies reviewed, per grandmother   Pts grandmother (Vivienne) called back with all of pts medications. Per grandmother pt is taking MIRABEGRON ER 50MG 1 tablet daily and OXYBUTYNIN 5MG 1 tablet 3 times a day.  Pts grandmother reports that pt does not have the NEXPLANON implant.

## 2020-10-29 ENCOUNTER — HOSPITAL ENCOUNTER (INPATIENT)
Facility: MEDICAL CENTER | Age: 30
LOS: 5 days | DRG: 123 | End: 2020-11-03
Attending: EMERGENCY MEDICINE | Admitting: INTERNAL MEDICINE
Payer: MEDICARE

## 2020-10-29 LAB
ALBUMIN SERPL BCP-MCNC: 4.2 G/DL (ref 3.2–4.9)
ALBUMIN/GLOB SERPL: 2.2 G/DL
ALP SERPL-CCNC: 84 U/L (ref 30–99)
ALT SERPL-CCNC: 17 U/L (ref 2–50)
ANION GAP SERPL CALC-SCNC: 12 MMOL/L (ref 7–16)
AST SERPL-CCNC: 13 U/L (ref 12–45)
BASOPHILS # BLD AUTO: 0.4 % (ref 0–1.8)
BASOPHILS # BLD: 0.03 K/UL (ref 0–0.12)
BILIRUB SERPL-MCNC: 0.2 MG/DL (ref 0.1–1.5)
BUN SERPL-MCNC: 8 MG/DL (ref 8–22)
CALCIUM SERPL-MCNC: 9 MG/DL (ref 8.4–10.2)
CHLORIDE SERPL-SCNC: 114 MMOL/L (ref 96–112)
CO2 SERPL-SCNC: 16 MMOL/L (ref 20–33)
CREAT SERPL-MCNC: 0.68 MG/DL (ref 0.5–1.4)
CRP SERPL HS-MCNC: 0.4 MG/DL (ref 0–0.75)
EOSINOPHIL # BLD AUTO: 0.1 K/UL (ref 0–0.51)
EOSINOPHIL NFR BLD: 1.3 % (ref 0–6.9)
ERYTHROCYTE [DISTWIDTH] IN BLOOD BY AUTOMATED COUNT: 46.5 FL (ref 35.9–50)
GLOBULIN SER CALC-MCNC: 1.9 G/DL (ref 1.9–3.5)
GLUCOSE SERPL-MCNC: 104 MG/DL (ref 65–99)
HCT VFR BLD AUTO: 42.2 % (ref 37–47)
HGB BLD-MCNC: 13.1 G/DL (ref 12–16)
IMM GRANULOCYTES # BLD AUTO: 0.02 K/UL (ref 0–0.11)
IMM GRANULOCYTES NFR BLD AUTO: 0.3 % (ref 0–0.9)
LYMPHOCYTES # BLD AUTO: 1.03 K/UL (ref 1–4.8)
LYMPHOCYTES NFR BLD: 13.8 % (ref 22–41)
MCH RBC QN AUTO: 27.9 PG (ref 27–33)
MCHC RBC AUTO-ENTMCNC: 31 G/DL (ref 33.6–35)
MCV RBC AUTO: 89.8 FL (ref 81.4–97.8)
MONOCYTES # BLD AUTO: 0.49 K/UL (ref 0–0.85)
MONOCYTES NFR BLD AUTO: 6.6 % (ref 0–13.4)
NEUTROPHILS # BLD AUTO: 5.81 K/UL (ref 2–7.15)
NEUTROPHILS NFR BLD: 77.6 % (ref 44–72)
NRBC # BLD AUTO: 0 K/UL
NRBC BLD-RTO: 0 /100 WBC
PLATELET # BLD AUTO: 184 K/UL (ref 164–446)
PMV BLD AUTO: 11.9 FL (ref 9–12.9)
POTASSIUM SERPL-SCNC: 4 MMOL/L (ref 3.6–5.5)
PROT SERPL-MCNC: 6.1 G/DL (ref 6–8.2)
RBC # BLD AUTO: 4.7 M/UL (ref 4.2–5.4)
SODIUM SERPL-SCNC: 142 MMOL/L (ref 135–145)
WBC # BLD AUTO: 7.5 K/UL (ref 4.8–10.8)

## 2020-10-29 PROCEDURE — 96375 TX/PRO/DX INJ NEW DRUG ADDON: CPT

## 2020-10-29 PROCEDURE — 85652 RBC SED RATE AUTOMATED: CPT

## 2020-10-29 PROCEDURE — A9270 NON-COVERED ITEM OR SERVICE: HCPCS | Performed by: EMERGENCY MEDICINE

## 2020-10-29 PROCEDURE — 86140 C-REACTIVE PROTEIN: CPT

## 2020-10-29 PROCEDURE — 80053 COMPREHEN METABOLIC PANEL: CPT

## 2020-10-29 PROCEDURE — C9803 HOPD COVID-19 SPEC COLLECT: HCPCS | Performed by: INTERNAL MEDICINE

## 2020-10-29 PROCEDURE — 99223 1ST HOSP IP/OBS HIGH 75: CPT | Mod: AI | Performed by: INTERNAL MEDICINE

## 2020-10-29 PROCEDURE — 99285 EMERGENCY DEPT VISIT HI MDM: CPT

## 2020-10-29 PROCEDURE — 96374 THER/PROPH/DIAG INJ IV PUSH: CPT

## 2020-10-29 PROCEDURE — 700111 HCHG RX REV CODE 636 W/ 250 OVERRIDE (IP): Performed by: EMERGENCY MEDICINE

## 2020-10-29 PROCEDURE — 770006 HCHG ROOM/CARE - MED/SURG/GYN SEMI*

## 2020-10-29 PROCEDURE — 36415 COLL VENOUS BLD VENIPUNCTURE: CPT

## 2020-10-29 PROCEDURE — 700102 HCHG RX REV CODE 250 W/ 637 OVERRIDE(OP): Performed by: EMERGENCY MEDICINE

## 2020-10-29 PROCEDURE — U0003 INFECTIOUS AGENT DETECTION BY NUCLEIC ACID (DNA OR RNA); SEVERE ACUTE RESPIRATORY SYNDROME CORONAVIRUS 2 (SARS-COV-2) (CORONAVIRUS DISEASE [COVID-19]), AMPLIFIED PROBE TECHNIQUE, MAKING USE OF HIGH THROUGHPUT TECHNOLOGIES AS DESCRIBED BY CMS-2020-01-R: HCPCS

## 2020-10-29 PROCEDURE — 85025 COMPLETE CBC W/AUTO DIFF WBC: CPT

## 2020-10-29 RX ORDER — AMOXICILLIN 250 MG
2 CAPSULE ORAL 2 TIMES DAILY
Status: DISCONTINUED | OUTPATIENT
Start: 2020-10-29 | End: 2020-11-03 | Stop reason: HOSPADM

## 2020-10-29 RX ORDER — OMEPRAZOLE 20 MG/1
20 CAPSULE, DELAYED RELEASE ORAL DAILY
Status: DISCONTINUED | OUTPATIENT
Start: 2020-10-30 | End: 2020-11-03 | Stop reason: HOSPADM

## 2020-10-29 RX ORDER — INSULIN GLARGINE 100 [IU]/ML
12 INJECTION, SOLUTION SUBCUTANEOUS EVERY EVENING
Status: DISCONTINUED | OUTPATIENT
Start: 2020-10-30 | End: 2020-11-03 | Stop reason: HOSPADM

## 2020-10-29 RX ORDER — SODIUM BICARBONATE 650 MG/1
1950 TABLET ORAL 2 TIMES DAILY
Status: DISCONTINUED | OUTPATIENT
Start: 2020-10-30 | End: 2020-11-03 | Stop reason: HOSPADM

## 2020-10-29 RX ORDER — GABAPENTIN 300 MG/1
300 CAPSULE ORAL EVERY EVENING
Status: DISCONTINUED | OUTPATIENT
Start: 2020-10-30 | End: 2020-11-03 | Stop reason: HOSPADM

## 2020-10-29 RX ORDER — METHYLPREDNISOLONE SODIUM SUCCINATE 40 MG/ML
1000 INJECTION, POWDER, LYOPHILIZED, FOR SOLUTION INTRAMUSCULAR; INTRAVENOUS DAILY
Status: DISCONTINUED | OUTPATIENT
Start: 2020-10-30 | End: 2020-10-30

## 2020-10-29 RX ORDER — OXCARBAZEPINE 300 MG/1
150 TABLET, FILM COATED ORAL 2 TIMES DAILY
Status: DISCONTINUED | OUTPATIENT
Start: 2020-10-30 | End: 2020-11-03 | Stop reason: HOSPADM

## 2020-10-29 RX ORDER — ZIPRASIDONE HYDROCHLORIDE 20 MG/1
40 CAPSULE ORAL 2 TIMES DAILY
Status: DISCONTINUED | OUTPATIENT
Start: 2020-10-30 | End: 2020-11-03 | Stop reason: HOSPADM

## 2020-10-29 RX ORDER — ONDANSETRON 2 MG/ML
4 INJECTION INTRAMUSCULAR; INTRAVENOUS ONCE
Status: COMPLETED | OUTPATIENT
Start: 2020-10-29 | End: 2020-10-29

## 2020-10-29 RX ORDER — ONDANSETRON 4 MG/1
4 TABLET, ORALLY DISINTEGRATING ORAL EVERY 4 HOURS PRN
Status: DISCONTINUED | OUTPATIENT
Start: 2020-10-29 | End: 2020-11-03 | Stop reason: HOSPADM

## 2020-10-29 RX ORDER — ACETAMINOPHEN 325 MG/1
650 TABLET ORAL EVERY 6 HOURS PRN
Status: DISCONTINUED | OUTPATIENT
Start: 2020-10-29 | End: 2020-11-03 | Stop reason: HOSPADM

## 2020-10-29 RX ORDER — BISACODYL 10 MG
10 SUPPOSITORY, RECTAL RECTAL
Status: DISCONTINUED | OUTPATIENT
Start: 2020-10-29 | End: 2020-11-03 | Stop reason: HOSPADM

## 2020-10-29 RX ORDER — BUSPIRONE HYDROCHLORIDE 5 MG/1
10 TABLET ORAL 2 TIMES DAILY
Status: DISCONTINUED | OUTPATIENT
Start: 2020-10-30 | End: 2020-11-03 | Stop reason: HOSPADM

## 2020-10-29 RX ORDER — PROMETHAZINE HYDROCHLORIDE 25 MG/1
12.5-25 SUPPOSITORY RECTAL EVERY 4 HOURS PRN
Status: DISCONTINUED | OUTPATIENT
Start: 2020-10-29 | End: 2020-11-03 | Stop reason: HOSPADM

## 2020-10-29 RX ORDER — METHYLPREDNISOLONE SODIUM SUCCINATE 125 MG/2ML
1000 INJECTION, POWDER, LYOPHILIZED, FOR SOLUTION INTRAMUSCULAR; INTRAVENOUS ONCE
Status: COMPLETED | OUTPATIENT
Start: 2020-10-29 | End: 2020-10-29

## 2020-10-29 RX ORDER — POLYETHYLENE GLYCOL 3350 17 G/17G
1 POWDER, FOR SOLUTION ORAL
Status: DISCONTINUED | OUTPATIENT
Start: 2020-10-29 | End: 2020-11-03 | Stop reason: HOSPADM

## 2020-10-29 RX ORDER — PROMETHAZINE HYDROCHLORIDE 25 MG/1
12.5-25 TABLET ORAL EVERY 4 HOURS PRN
Status: DISCONTINUED | OUTPATIENT
Start: 2020-10-29 | End: 2020-11-03 | Stop reason: HOSPADM

## 2020-10-29 RX ORDER — LEVOTHYROXINE SODIUM 0.07 MG/1
75 TABLET ORAL
Status: DISCONTINUED | OUTPATIENT
Start: 2020-10-30 | End: 2020-11-03 | Stop reason: HOSPADM

## 2020-10-29 RX ORDER — METHOCARBAMOL 500 MG/1
750 TABLET, FILM COATED ORAL 4 TIMES DAILY PRN
Status: DISCONTINUED | OUTPATIENT
Start: 2020-10-30 | End: 2020-11-03 | Stop reason: HOSPADM

## 2020-10-29 RX ORDER — ACETAMINOPHEN 325 MG/1
650 TABLET ORAL ONCE
Status: COMPLETED | OUTPATIENT
Start: 2020-10-29 | End: 2020-10-29

## 2020-10-29 RX ORDER — TOPIRAMATE 25 MG/1
50 TABLET ORAL EVERY EVENING
Status: DISCONTINUED | OUTPATIENT
Start: 2020-10-30 | End: 2020-11-03 | Stop reason: HOSPADM

## 2020-10-29 RX ORDER — TRAZODONE HYDROCHLORIDE 50 MG/1
100 TABLET ORAL EVERY EVENING
Status: DISCONTINUED | OUTPATIENT
Start: 2020-10-30 | End: 2020-11-03 | Stop reason: HOSPADM

## 2020-10-29 RX ORDER — ONDANSETRON 2 MG/ML
4 INJECTION INTRAMUSCULAR; INTRAVENOUS EVERY 4 HOURS PRN
Status: DISCONTINUED | OUTPATIENT
Start: 2020-10-29 | End: 2020-11-03 | Stop reason: HOSPADM

## 2020-10-29 RX ORDER — FLUOXETINE HYDROCHLORIDE 20 MG/1
40 CAPSULE ORAL DAILY
Status: DISCONTINUED | OUTPATIENT
Start: 2020-10-30 | End: 2020-11-03 | Stop reason: HOSPADM

## 2020-10-29 RX ORDER — METHYLPREDNISOLONE SODIUM SUCCINATE 125 MG/2ML
1000 INJECTION, POWDER, LYOPHILIZED, FOR SOLUTION INTRAMUSCULAR; INTRAVENOUS ONCE
Status: DISCONTINUED | OUTPATIENT
Start: 2020-10-29 | End: 2020-10-29

## 2020-10-29 RX ORDER — OXYBUTYNIN CHLORIDE 5 MG/1
5 TABLET ORAL 3 TIMES DAILY
Status: DISCONTINUED | OUTPATIENT
Start: 2020-10-30 | End: 2020-11-03 | Stop reason: HOSPADM

## 2020-10-29 RX ORDER — ACETAZOLAMIDE 500 MG/1
CAPSULE, EXTENDED RELEASE ORAL 2 TIMES DAILY
Status: DISCONTINUED | OUTPATIENT
Start: 2020-10-29 | End: 2020-10-30

## 2020-10-29 RX ORDER — MYCOPHENOLATE MOFETIL 250 MG/1
1000 CAPSULE ORAL ONCE
Status: COMPLETED | OUTPATIENT
Start: 2020-10-29 | End: 2020-10-29

## 2020-10-29 RX ORDER — DEXTROSE MONOHYDRATE 25 G/50ML
50 INJECTION, SOLUTION INTRAVENOUS
Status: DISCONTINUED | OUTPATIENT
Start: 2020-10-29 | End: 2020-11-03 | Stop reason: HOSPADM

## 2020-10-29 RX ORDER — MYCOPHENOLATE MOFETIL 250 MG/1
1000 CAPSULE ORAL 2 TIMES DAILY
Status: DISCONTINUED | OUTPATIENT
Start: 2020-10-29 | End: 2020-11-03 | Stop reason: HOSPADM

## 2020-10-29 RX ORDER — LABETALOL HYDROCHLORIDE 5 MG/ML
10 INJECTION, SOLUTION INTRAVENOUS EVERY 4 HOURS PRN
Status: DISCONTINUED | OUTPATIENT
Start: 2020-10-29 | End: 2020-11-03 | Stop reason: HOSPADM

## 2020-10-29 RX ORDER — PROCHLORPERAZINE EDISYLATE 5 MG/ML
5-10 INJECTION INTRAMUSCULAR; INTRAVENOUS EVERY 4 HOURS PRN
Status: DISCONTINUED | OUTPATIENT
Start: 2020-10-29 | End: 2020-11-03 | Stop reason: HOSPADM

## 2020-10-29 RX ADMIN — ACETAMINOPHEN 650 MG: 325 TABLET, FILM COATED ORAL at 23:18

## 2020-10-29 RX ADMIN — MYCOPHENOLATE MOFETIL 1000 MG: 250 CAPSULE ORAL at 23:17

## 2020-10-29 RX ADMIN — ONDANSETRON 4 MG: 2 INJECTION INTRAMUSCULAR; INTRAVENOUS at 23:08

## 2020-10-29 RX ADMIN — METHYLPREDNISOLONE SODIUM SUCCINATE 1000 MG: 125 INJECTION, POWDER, FOR SOLUTION INTRAMUSCULAR; INTRAVENOUS at 23:08

## 2020-10-29 ASSESSMENT — ENCOUNTER SYMPTOMS
FALLS: 1
CHILLS: 0
COUGH: 0
PHOTOPHOBIA: 1
VOMITING: 0
STRIDOR: 0
HEADACHES: 0
MYALGIAS: 0
CONSTIPATION: 0
PALPITATIONS: 0
FEVER: 0
SPUTUM PRODUCTION: 0
NAUSEA: 0
ABDOMINAL PAIN: 0
SHORTNESS OF BREATH: 0
TINGLING: 0
DIARRHEA: 0
BLURRED VISION: 1
LOSS OF CONSCIOUSNESS: 0
EYE PAIN: 1
DIZZINESS: 0
WEAKNESS: 0
DEPRESSION: 0

## 2020-10-29 ASSESSMENT — FIBROSIS 4 INDEX: FIB4 SCORE: 0.4

## 2020-10-30 LAB
ANION GAP SERPL CALC-SCNC: 14 MMOL/L (ref 7–16)
BUN SERPL-MCNC: 9 MG/DL (ref 8–22)
CALCIUM SERPL-MCNC: 8.8 MG/DL (ref 8.4–10.2)
CHLORIDE SERPL-SCNC: 113 MMOL/L (ref 96–112)
CO2 SERPL-SCNC: 11 MMOL/L (ref 20–33)
COVID ORDER STATUS COVID19: NORMAL
CREAT SERPL-MCNC: 0.63 MG/DL (ref 0.5–1.4)
ERYTHROCYTE [DISTWIDTH] IN BLOOD BY AUTOMATED COUNT: 44.5 FL (ref 35.9–50)
ERYTHROCYTE [SEDIMENTATION RATE] IN BLOOD BY WESTERGREN METHOD: 5 MM/HOUR (ref 0–20)
GLUCOSE BLD-MCNC: 124 MG/DL (ref 65–99)
GLUCOSE BLD-MCNC: 145 MG/DL (ref 65–99)
GLUCOSE BLD-MCNC: 170 MG/DL (ref 65–99)
GLUCOSE SERPL-MCNC: 179 MG/DL (ref 65–99)
HCT VFR BLD AUTO: 42.1 % (ref 37–47)
HGB BLD-MCNC: 13.2 G/DL (ref 12–16)
MCH RBC QN AUTO: 28.1 PG (ref 27–33)
MCHC RBC AUTO-ENTMCNC: 31.4 G/DL (ref 33.6–35)
MCV RBC AUTO: 89.6 FL (ref 81.4–97.8)
PLATELET # BLD AUTO: 176 K/UL (ref 164–446)
PMV BLD AUTO: 11.4 FL (ref 9–12.9)
POTASSIUM SERPL-SCNC: 4.1 MMOL/L (ref 3.6–5.5)
RBC # BLD AUTO: 4.7 M/UL (ref 4.2–5.4)
SARS-COV-2 RNA RESP QL NAA+PROBE: NOTDETECTED
SODIUM SERPL-SCNC: 138 MMOL/L (ref 135–145)
SPECIMEN SOURCE: NORMAL
WBC # BLD AUTO: 7.3 K/UL (ref 4.8–10.8)

## 2020-10-30 PROCEDURE — 700111 HCHG RX REV CODE 636 W/ 250 OVERRIDE (IP): Performed by: INTERNAL MEDICINE

## 2020-10-30 PROCEDURE — 82962 GLUCOSE BLOOD TEST: CPT | Mod: 91

## 2020-10-30 PROCEDURE — 700102 HCHG RX REV CODE 250 W/ 637 OVERRIDE(OP): Performed by: INTERNAL MEDICINE

## 2020-10-30 PROCEDURE — A9270 NON-COVERED ITEM OR SERVICE: HCPCS | Performed by: INTERNAL MEDICINE

## 2020-10-30 PROCEDURE — 700105 HCHG RX REV CODE 258: Performed by: INTERNAL MEDICINE

## 2020-10-30 PROCEDURE — 96375 TX/PRO/DX INJ NEW DRUG ADDON: CPT

## 2020-10-30 PROCEDURE — 770006 HCHG ROOM/CARE - MED/SURG/GYN SEMI*

## 2020-10-30 PROCEDURE — 99232 SBSQ HOSP IP/OBS MODERATE 35: CPT | Performed by: HOSPITALIST

## 2020-10-30 PROCEDURE — 85027 COMPLETE CBC AUTOMATED: CPT

## 2020-10-30 PROCEDURE — 80048 BASIC METABOLIC PNL TOTAL CA: CPT

## 2020-10-30 PROCEDURE — 36415 COLL VENOUS BLD VENIPUNCTURE: CPT

## 2020-10-30 RX ORDER — ACETAZOLAMIDE 250 MG/1
1000 TABLET ORAL EVERY EVENING
Status: DISCONTINUED | OUTPATIENT
Start: 2020-10-30 | End: 2020-11-03 | Stop reason: HOSPADM

## 2020-10-30 RX ORDER — ACETAZOLAMIDE 250 MG/1
1500 TABLET ORAL EVERY MORNING
Status: DISCONTINUED | OUTPATIENT
Start: 2020-10-30 | End: 2020-11-03 | Stop reason: HOSPADM

## 2020-10-30 RX ADMIN — OXCARBAZEPINE 150 MG: 300 TABLET, FILM COATED ORAL at 06:40

## 2020-10-30 RX ADMIN — MYCOPHENOLATE MOFETIL 1000 MG: 250 CAPSULE ORAL at 23:28

## 2020-10-30 RX ADMIN — LEVOTHYROXINE SODIUM 75 MCG: 0.07 TABLET ORAL at 06:37

## 2020-10-30 RX ADMIN — TRAZODONE HYDROCHLORIDE 100 MG: 50 TABLET ORAL at 20:55

## 2020-10-30 RX ADMIN — BUSPIRONE HYDROCHLORIDE 10 MG: 5 TABLET ORAL at 17:24

## 2020-10-30 RX ADMIN — ENOXAPARIN SODIUM 40 MG: 40 INJECTION SUBCUTANEOUS at 06:36

## 2020-10-30 RX ADMIN — ACETAZOLAMIDE 1500 MG: 250 TABLET ORAL at 06:39

## 2020-10-30 RX ADMIN — SODIUM BICARBONATE 650 MG TABLET 1950 MG: at 17:24

## 2020-10-30 RX ADMIN — ASPIRIN 81 MG: 81 TABLET, COATED ORAL at 06:40

## 2020-10-30 RX ADMIN — OXCARBAZEPINE 150 MG: 300 TABLET, FILM COATED ORAL at 17:24

## 2020-10-30 RX ADMIN — INSULIN GLARGINE 12 UNITS: 100 INJECTION, SOLUTION SUBCUTANEOUS at 17:27

## 2020-10-30 RX ADMIN — OMEPRAZOLE 20 MG: 20 CAPSULE, DELAYED RELEASE ORAL at 06:39

## 2020-10-30 RX ADMIN — SODIUM CHLORIDE 1000 MG: 9 INJECTION, SOLUTION INTRAVENOUS at 20:56

## 2020-10-30 RX ADMIN — SODIUM BICARBONATE 650 MG TABLET 1950 MG: at 06:36

## 2020-10-30 RX ADMIN — ACETAMINOPHEN 650 MG: 325 TABLET, FILM COATED ORAL at 17:23

## 2020-10-30 RX ADMIN — OXYBUTYNIN CHLORIDE 5 MG: 5 TABLET ORAL at 06:40

## 2020-10-30 RX ADMIN — PROCHLORPERAZINE EDISYLATE 10 MG: 5 INJECTION INTRAMUSCULAR; INTRAVENOUS at 00:12

## 2020-10-30 RX ADMIN — MYCOPHENOLATE MOFETIL 1000 MG: 250 CAPSULE ORAL at 11:05

## 2020-10-30 RX ADMIN — INSULIN HUMAN 1 UNITS: 100 INJECTION, SOLUTION PARENTERAL at 06:52

## 2020-10-30 RX ADMIN — ACETAZOLAMIDE 1000 MG: 250 TABLET ORAL at 17:24

## 2020-10-30 RX ADMIN — BUSPIRONE HYDROCHLORIDE 10 MG: 5 TABLET ORAL at 06:40

## 2020-10-30 RX ADMIN — TOPIRAMATE 50 MG: 25 TABLET, FILM COATED ORAL at 17:25

## 2020-10-30 RX ADMIN — ZIPRASIDONE HYDROCHLORIDE 40 MG: 20 CAPSULE ORAL at 06:37

## 2020-10-30 RX ADMIN — OXYBUTYNIN CHLORIDE 5 MG: 5 TABLET ORAL at 17:24

## 2020-10-30 RX ADMIN — SENNOSIDES-DOCUSATE SODIUM TAB 8.6-50 MG 2 TABLET: 8.6-5 TAB at 17:23

## 2020-10-30 RX ADMIN — GABAPENTIN 300 MG: 300 CAPSULE ORAL at 17:23

## 2020-10-30 RX ADMIN — ZIPRASIDONE HYDROCHLORIDE 40 MG: 20 CAPSULE ORAL at 17:25

## 2020-10-30 RX ADMIN — FLUOXETINE 40 MG: 20 CAPSULE ORAL at 06:38

## 2020-10-30 RX ADMIN — ACETAMINOPHEN 650 MG: 325 TABLET, FILM COATED ORAL at 11:05

## 2020-10-30 RX ADMIN — OXYBUTYNIN CHLORIDE 5 MG: 5 TABLET ORAL at 11:05

## 2020-10-30 ASSESSMENT — ENCOUNTER SYMPTOMS
ABDOMINAL PAIN: 0
DIAPHORESIS: 0
SORE THROAT: 0
MYALGIAS: 0
FOCAL WEAKNESS: 0
VOMITING: 0
FEVER: 0
WEIGHT LOSS: 0
EYE PAIN: 1
SHORTNESS OF BREATH: 0
PSYCHIATRIC NEGATIVE: 1
NEUROLOGICAL NEGATIVE: 1
EYE DISCHARGE: 0
DIZZINESS: 0
BRUISES/BLEEDS EASILY: 0
EYE REDNESS: 0
BLURRED VISION: 1
PALPITATIONS: 0
CHILLS: 0
WEAKNESS: 0
DEPRESSION: 0
GASTROINTESTINAL NEGATIVE: 1
CARDIOVASCULAR NEGATIVE: 1
NAUSEA: 0
RESPIRATORY NEGATIVE: 1
CONSTITUTIONAL NEGATIVE: 1
COUGH: 0
PHOTOPHOBIA: 1
DOUBLE VISION: 0
MUSCULOSKELETAL NEGATIVE: 1
HEADACHES: 0

## 2020-10-30 ASSESSMENT — COGNITIVE AND FUNCTIONAL STATUS - GENERAL
DRESSING REGULAR UPPER BODY CLOTHING: A LITTLE
DRESSING REGULAR LOWER BODY CLOTHING: A LITTLE
MOBILITY SCORE: 16
MOVING FROM LYING ON BACK TO SITTING ON SIDE OF FLAT BED: A LITTLE
DAILY ACTIVITIY SCORE: 18
SUGGESTED CMS G CODE MODIFIER DAILY ACTIVITY: CK
SUGGESTED CMS G CODE MODIFIER MOBILITY: CK
TOILETING: A LOT
HELP NEEDED FOR BATHING: A LOT
CLIMB 3 TO 5 STEPS WITH RAILING: A LOT
WALKING IN HOSPITAL ROOM: A LOT
MOVING TO AND FROM BED TO CHAIR: A LITTLE
STANDING UP FROM CHAIR USING ARMS: A LOT

## 2020-10-30 ASSESSMENT — LIFESTYLE VARIABLES
ON A TYPICAL DAY WHEN YOU DRINK ALCOHOL HOW MANY DRINKS DO YOU HAVE: 0
HAVE YOU EVER FELT YOU SHOULD CUT DOWN ON YOUR DRINKING: NO
EVER HAD A DRINK FIRST THING IN THE MORNING TO STEADY YOUR NERVES TO GET RID OF A HANGOVER: NO
HAVE PEOPLE ANNOYED YOU BY CRITICIZING YOUR DRINKING: NO
SUBSTANCE_ABUSE: 0
TOTAL SCORE: 0
EVER FELT BAD OR GUILTY ABOUT YOUR DRINKING: NO
CONSUMPTION TOTAL: NEGATIVE
TOTAL SCORE: 0
AVERAGE NUMBER OF DAYS PER WEEK YOU HAVE A DRINK CONTAINING ALCOHOL: 0
TOTAL SCORE: 0
ALCOHOL_USE: NO
HOW MANY TIMES IN THE PAST YEAR HAVE YOU HAD 5 OR MORE DRINKS IN A DAY: 0

## 2020-10-30 ASSESSMENT — PAIN DESCRIPTION - PAIN TYPE
TYPE: ACUTE PAIN

## 2020-10-30 NOTE — PROGRESS NOTES
2 RN skin check complete with Ignacia HASSAN. Suprapubic catheter present on admission. 2 small ulcers located on the L breast - 1 is red and open with defined edges, not bleeding at this time; the other is red in color, closed. Small bruise on L inner thigh region.   Pt stated that she had a fall prior to arrival. No bruises noted on LE's or UE's at this time.

## 2020-10-30 NOTE — CARE PLAN
Problem: Safety  Goal: Will remain free from injury  Outcome: PROGRESSING AS EXPECTED  Goal: Will remain free from falls  Outcome: PROGRESSING AS EXPECTED  Note: Patient nonslip socks on, bed in low position, call light in reach, and bed alarm on if warranted     Problem: Infection  Goal: Will remain free from infection  Outcome: PROGRESSING AS EXPECTED  Note: Patient vitals, labs, mentation, and I/os monitored throughout shift       DISPLAY PLAN FREE TEXT

## 2020-10-30 NOTE — CARE PLAN
Problem: Safety  Goal: Will remain free from injury  Outcome: PROGRESSING AS EXPECTED  Goal: Will remain free from falls  Outcome: PROGRESSING AS EXPECTED  Note: Due to inability to see well at this time, will have bed alarm on and safety precautions in place. Patient educated not to get out of bed without the help from staff. Patient verbalized understanding.      Problem: Pain Management  Goal: Pain level will decrease to patient's comfort goal  Outcome: PROGRESSING AS EXPECTED  Note: CellCept, tylenol, and environmental changes being used at this time to help control pain

## 2020-10-30 NOTE — H&P
Hospital Medicine History & Physical Note    Date of Service  10/29/2020    Primary Care Physician  Torres Brody M.D.    Consultants  None    Code Status  Full Code    Chief Complaint  Chief Complaint   Patient presents with   • Eye Pain       History of Presenting Illness  30 y.o. female who presented 10/29/2020 with right eye pain. She states this started around 5 PM with right eye pain.  She states this pain is the same as her previous optic neuritis pain.  She does have a previous diagnosis of recurrent optic neuritis, does follow with neurology.  She states whenever it first started years ago it was her right eye but then lately it has been her left eye, today was her right eye again.  She generally does take CellCept as well as 10 mg of prednisone every day.  She states she ran out of her CellCept approximately 1 week ago.  She states her pain is worse with movement, she complains of photophobia.  She states her pain is a dull ache, 10/10 at its worse.  She also complains of blurred vision.  I did discuss the case including labs with the ER physician.  She is currently refusing a CT due to frequent CTs recently.    Review of Systems  Review of Systems   Constitutional: Negative for chills, fever and malaise/fatigue.   HENT: Negative for congestion.    Eyes: Positive for blurred vision, photophobia and pain.   Respiratory: Negative for cough, sputum production, shortness of breath and stridor.    Cardiovascular: Negative for chest pain, palpitations and leg swelling.   Gastrointestinal: Negative for abdominal pain, constipation, diarrhea, nausea and vomiting.   Genitourinary: Negative for dysuria and urgency.   Musculoskeletal: Positive for falls. Negative for myalgias.   Neurological: Negative for dizziness, tingling, loss of consciousness, weakness and headaches.   Psychiatric/Behavioral: Negative for depression and suicidal ideas.   All other systems reviewed and are negative.      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    Surgical History   has a past surgical history that includes neuro dest facet l/s w/ig sngl (4/21/2015); recovery (1/27/2016); delmar by laparoscopy (8/29/2010); lumbar fusion anterior (8/21/2012); other cardiac surgery (1/2016); tonsillectomy; bowel stimulator insertion (7/13/2016); gastroscopy with balloon dilatation (N/A, 1/4/2017); muscle biopsy (Right, 1/26/2017); other abdominal surgery; and laminotomy.     Family History  family history includes Genitourinary () Problems in her sister; Heart Disease in her maternal grandmother and mother; Hypertension in her maternal grandmother, maternal uncle, and mother; No Known Problems in her sister; Other in her mother and sister; Sleep Apnea in her mother.     Social History   reports that she has never smoked. She has never used smokeless tobacco. She reports previous drug use. Frequency: 7.00 times per week. Drug: Marijuana. She reports that she does not drink alcohol.    Allergies  Allergies  "  Allergen Reactions   • Depakote [Divalproex Sodium] Unspecified     Muscle spasms/muscle pain and weakness     • Amitriptyline Unspecified     Headaches     • Aripiprazole [Abilify] Unspecified     Headaches/muscle twitching     • Clindamycin Nausea     Even with food     • Flagyl [Metronidazole Hcl] Unspecified     \"eye problems\"     • Flomax [Tamsulosin Hydrochloride] Swelling   • Levaquin Unspecified     Severe muscle cramps in legs  RXN=unknown   • Metformin Unspecified     Increased lactic acid      • Tape Rash     Tears skin off  coban with Tegaderm tape ok intermittently  RXN=ongoing   • Vancomycin Itching     Pt becomes flushed in face and chest.   RXN=7/10/16   • Wound Dressing Adhesive Hives     By pt report   • Ciprofloxacin    • Hydromorphone Hcl      Pt states she feels loopy   • Keflex Rash     Pt states she gets a rash with this medication  Tolerates ceftriaxone   • Levofloxacin Unspecified     Leg muscle cramps   • Metronidazole Rash     .   • Penicillins Hives   • Tamsulosin Swelling   • Valproic Acid Rash     .       Medications  Prior to Admission Medications   Prescriptions Last Dose Informant Patient Reported? Taking?   Dulaglutide (TRULICITY) 1.5 MG/0.5ML Solution Pen-injector  Family Member Yes No   Sig: Inject 0.5 mL as instructed every Friday.   Melatonin 10 MG Tab  Family Member Yes No   Sig: Take 10 mg by mouth every evening.   Mirabegron ER (MYRBETRIQ) 50 MG TABLET SR 24 HR  Family Member Yes No   Sig: Take 50 mg by mouth every day.   OXcarbazepine (TRILEPTAL) 150 MG Tab  Family Member No No   Sig: TAKE ONE TABLET BY MOUTH TWICE DAILY   Patient taking differently: Take 150 mg by mouth 2 times a day.   acetaZOLAMIDE SR (DIAMOX) 500 MG CAPSULE SR 12 HR  Family Member Yes No   Sig: Take 1,000-1,500 mg by mouth 2 times a day. 1500mg in AM  1000mg at PM   albuterol 108 (90 Base) MCG/ACT Aero Soln inhalation aerosol  Family Member Yes No   Sig: Inhale 2 Puffs by mouth every 6 hours as needed " for Shortness of Breath.   aspirin EC (ECOTRIN) 81 MG Tablet Delayed Response  Family Member Yes No   Sig: Take 81 mg by mouth every day.   busPIRone (BUSPAR) 10 MG Tab tablet  Family Member Yes No   Sig: Take 10 mg by mouth 2 times a day.   calcium carbonate (TUMS EX) 750 MG chewable tablet  Family Member No No   Sig: Take 2 Tabs by mouth every day.   Patient taking differently: Take 1,500 mg by mouth as needed. Indications: Heartburn, Sour Stomach   fluoxetine (PROZAC) 40 MG capsule  Family Member No No   Sig: Take 1 Cap by mouth every day.   furosemide (LASIX) 80 MG Tab  Family Member Yes No   Sig: Take 80 mg by mouth 1 time daily as needed. Indications: Edema   gabapentin (NEURONTIN) 300 MG Cap  Family Member Yes No   Sig: Take 300 mg by mouth every evening.   insulin glargine (LANTUS) 100 UNIT/ML Solution  Family Member No No   Sig: Inject 8 Units as instructed every evening.   Patient taking differently: Inject 12 Units as instructed every evening.   ivabradine (CORLANOR) 7.5 MG Tab tablet  Family Member Yes No   Sig: Take 7.5 mg by mouth 2 times a day, with meals.   levothyroxine (SYNTHROID) 75 MCG Tab  Family Member No No   Sig: Take 1 Tab by mouth Every morning on an empty stomach.   lidocaine (XYLOCAINE) 2 % Solution  Family Member No No   Sig: Take 5 mL by mouth as needed for Throat/Mouth Pain (q6hr PRN throat pain, ok to rinse and spit or swallow).   methocarbamol (ROBAXIN) 750 MG Tab  Family Member Yes No   Sig: Take 750 mg by mouth as needed. Indications: Musculoskeletal Pain   mycophenolate (CELLCEPT) 500 MG tablet  Family Member Yes No   Sig: Take 1,000 mg by mouth 2 times a day.   omeprazole (PRILOSEC) 20 MG delayed-release capsule  Family Member No No   Sig: Take 1 Cap by mouth every day.   ondansetron (ZOFRAN ODT) 4 MG TABLET DISPERSIBLE  Family Member Yes No   Sig: Take 4 mg by mouth every 6 hours as needed for Nausea.   oxybutynin (DITROPAN) 5 MG Tab  Family Member No No   Sig: Take 1 Tab by  mouth 3 times a day.   potassium chloride SA (KDUR) 20 MEQ Tab CR  Family Member Yes No   Sig: Take 40 mEq by mouth 2 times a day.   predniSONE (DELTASONE) 10 MG Tab  Family Member Yes No   Sig: Take 10 mg by mouth every day. Maintenance Medication.   sodium bicarbonate (SODIUM BICARBONATE) 650 MG Tab  Family Member Yes No   Sig: Take 1,950 mg by mouth 2 times a day.   topiramate (TOPAMAX) 25 MG Tab  Family Member Yes No   Sig: Take 50 mg by mouth every evening.   traZODone (DESYREL) 100 MG Tab  Family Member No No   Sig: Take 1 Tab by mouth every bedtime.   Patient taking differently: Take 100 mg by mouth every evening.   vitamin D, Ergocalciferol, (DRISDOL) 1.25 MG (39304 UT) Cap capsule  Family Member Yes No   Sig: Take 50,000 Units by mouth every 7 days. On Wed   ziprasidone (GEODON) 40 MG Cap  Family Member No No   Sig: Take 1 Cap by mouth 2 Times a Day.      Facility-Administered Medications: None       Physical Exam  Temp:  [36.8 °C (98.2 °F)] 36.8 °C (98.2 °F)  Pulse:  [80-81] 80  Resp:  [16] 16  BP: (123-138)/(58-78) 138/78  SpO2:  [92 %-94 %] 94 %    Physical Exam  Vitals signs and nursing note reviewed.   Constitutional:       General: She is not in acute distress.     Appearance: She is well-developed. She is obese. She is not diaphoretic.   HENT:      Head: Normocephalic and atraumatic.      Right Ear: External ear normal.      Left Ear: External ear normal.      Nose: Nose normal. No congestion or rhinorrhea.      Mouth/Throat:      Mouth: Mucous membranes are moist.      Pharynx: No oropharyngeal exudate.   Eyes:      General:         Right eye: No discharge.         Left eye: No discharge.   Neck:      Musculoskeletal: Normal range of motion and neck supple.      Trachea: No tracheal deviation.   Cardiovascular:      Rate and Rhythm: Normal rate and regular rhythm.      Heart sounds: No murmur. No friction rub. No gallop.    Pulmonary:      Effort: Pulmonary effort is normal. No respiratory distress.       Breath sounds: Normal breath sounds. No stridor. No wheezing or rales.   Chest:      Chest wall: No tenderness.   Abdominal:      General: Bowel sounds are normal. There is no distension.      Palpations: Abdomen is soft.      Tenderness: There is no abdominal tenderness.   Musculoskeletal: Normal range of motion.         General: No tenderness.      Right lower leg: No edema.      Left lower leg: No edema.   Lymphadenopathy:      Cervical: No cervical adenopathy.   Skin:     General: Skin is warm and dry.      Coloration: Skin is not jaundiced.      Findings: No erythema or rash.   Neurological:      General: No focal deficit present.      Mental Status: She is alert and oriented to person, place, and time.      Cranial Nerves: No cranial nerve deficit.   Psychiatric:         Mood and Affect: Mood normal.         Behavior: Behavior normal.         Thought Content: Thought content normal.         Judgment: Judgment normal.         Laboratory:  Recent Labs     10/29/20  2240   WBC 7.5   RBC 4.70   HEMOGLOBIN 13.1   HEMATOCRIT 42.2   MCV 89.8   MCH 27.9   MCHC 31.0*   RDW 46.5   PLATELETCT 184   MPV 11.9     Recent Labs     10/29/20  2240   SODIUM 142   POTASSIUM 4.0   CHLORIDE 114*   CO2 16*   GLUCOSE 104*   BUN 8   CREATININE 0.68   CALCIUM 9.0     Recent Labs     10/29/20  2240   ALTSGPT 17   ASTSGOT 13   ALKPHOSPHAT 84   TBILIRUBIN 0.2   GLUCOSE 104*         No results for input(s): NTPROBNP in the last 72 hours.      No results for input(s): TROPONINT in the last 72 hours.    Imaging:  No orders to display         Assessment/Plan:  I anticipate this patient will require at least two midnights for appropriate medical management, necessitating inpatient admission.    Optic neuritis- (present on admission)  Assessment & Plan  -Recurrent, does follow as an outpatient with neurology  -Ran out of her CellCept, restart CellCept  -Start high-dose Solu-Medrol, 1 g daily  -Patient is currently refusing CT scans, I did  tell her that if her condition did not improve with the addition of high-dose steroids we may need to get CT scans, she did understand    Diabetes mellitus type 2 in obese (HCC)- (present on admission)  Assessment & Plan  -Continue home Lantus  -Start low insulin sliding scale  -Adjust as needed    Schizoaffective disorder, depressive type (HCC)- (present on admission)  Assessment & Plan  -Continue home meds    Hypothyroidism- (present on admission)  Assessment & Plan  -Continue home Synthroid at 75 mcg daily  -Most recent TSH was approximately 5 months ago and was normal at 1.23    GERD (gastroesophageal reflux disease)- (present on admission)  Assessment & Plan  -Continue home omeprazole    Morbidly obese (HCC)- (present on admission)  Assessment & Plan  -Chronic, needs diet and exercise adjustment post discharge

## 2020-10-30 NOTE — ASSESSMENT & PLAN NOTE
Continue home Lantus  continue insulin sliding scale  Blood sugars increased with IV steroids  Continue to follow

## 2020-10-30 NOTE — PROGRESS NOTES
"0046 Received report from Kailey HASSAN.   0114 Patient on floor. Assumed care of pt.  Patient is currently AOx4, reporting 10/10 R eye pain that is dull and achy in nature. Reporting no SOB or chest pain at this time. VSS. Patient stated that it is usually her L eye that causes her pain, r/t her optic neuritis. Currently unable to evaluate eyes at this time due to pain. Provided eye mask at this time to help alleviate pain as patient stated \"the light hurt my eyes too much,\" will medicate per MAR and make environmental changes as needed. Patient currently has suprapubic cath placed for \"urinary retention.\" Patient stated that her caregiver ran out of CellCept and accredits the pain to not having it.     Patient's caregivers are Grandmother (Vivienne Mclean) and Aunt (Etelvina Holcomb) at this time.     Ongoing POC discussed with patient and pt verbalized understanding. Q4 neuro checks in place at this time. Hourly rounding in place. Bed locked and in lowest position, call light and belongings within reach. Patient educated not to get out of bed without assistance. Seizure precautions in place due to previous hx.       "

## 2020-10-30 NOTE — ED PROVIDER NOTES
ED Provider Note    Scribed for Emery Piña M.D. by Emery Piña M.D.. 10/29/2020,  9:27 PM.    CHIEF COMPLAINT  Chief Complaint   Patient presents with   • Eye Pain       HPI  Kristin Balderrama is a 30 y.o. female with an extremely complicated medical history which includes recurrences of optic neuritis, who presents to the Emergency Department reporting right eye pain.  She reports that her neuro-ophthalmologist, Dr. Latia lorenz, has her on CellCept for prevention of optic neuritis, but that she ran out of it about a week and a half ago, and thinks this is allowing a recurrence.  She reports severe constant pain, and does not want to open her eyes because of it.  She reports associated back pain.  She has not had fevers or chills, any trauma, nausea or vomiting or chest pain or shortness of breath.    This patient's records show that she has had 10 head CTs already in 2020.    REVIEW OF SYSTEMS  See HPI for further details. All other systems are negative.     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    SOCIAL HISTORY  Social History     Tobacco Use   • Smoking status: Never Smoker   • Smokeless tobacco: Never Used   Substance and Sexual Activity   • Alcohol use: No     Alcohol/week: 0.0 oz     Frequency: Never     Binge frequency: Never   • Drug use: Not Currently     Frequency: 7.0 times per week     Types: Marijuana   • Sexual activity: Not Currently     Birth control/protection: Pill     Social History     Substance and Sexual Activity   Drug Use Not Currently   • Frequency: 7.0 times per week   • Types: Marijuana       SURGICAL HISTORY   has a past surgical history that includes neuro dest facet l/s w/ig sngl (4/21/2015); recovery (1/27/2016); [place] by laparoscopy (8/29/2010); lumbar fusion anterior (8/21/2012); other cardiac surgery (1/2016); tonsillectomy; bowel stimulator insertion (7/13/2016); gastroscopy with balloon dilatation (N/A, 1/4/2017); muscle biopsy (Right, 1/26/2017); other abdominal surgery; and laminotomy.    CURRENT MEDICATIONS  Home Medications     Reviewed by Kailey Cooney R.N. (Registered Nurse) on 10/29/20 at 2358  Med List Status: Partial   Medication Last Dose Status   acetaZOLAMIDE SR (DIAMOX) 500 MG CAPSULE SR 12 HR 10/29/2020 Active   albuterol 108 (90 Base) MCG/ACT Aero Soln inhalation aerosol 10/27/2020 Active   aspirin EC (ECOTRIN) 81 MG Tablet Delayed Response 10/29/2020 Active   busPIRone (BUSPAR) 10 MG Tab tablet 10/29/2020 Active   calcium carbonate (TUMS EX) 750 MG chewable tablet 10/29/2020 Active   Dulaglutide (TRULICITY) 1.5 MG/0.5ML Solution Pen-injector 10/23/2020 Active   fluoxetine (PROZAC) 40 MG capsule 10/29/2020 Active   furosemide (LASIX) 80 MG Tab  Active   gabapentin (NEURONTIN) 300 MG Cap 10/29/2020 Active   insulin glargine (LANTUS) 100 UNIT/ML Solution 10/29/2020 Active   ivabradine (CORLANOR) 7.5 MG Tab tablet 10/29/2020 Active   levothyroxine (SYNTHROID) 75 MCG Tab 10/29/2020 Active  "  lidocaine (XYLOCAINE) 2 % Solution 10/26/2020 Active   Melatonin 10 MG Tab 10/28/2020 Active   methocarbamol (ROBAXIN) 750 MG Tab 10/27/2020 Active   Mirabegron ER (MYRBETRIQ) 50 MG TABLET SR 24 HR 10/29/2020 Active   mycophenolate (CELLCEPT) 500 MG tablet 10/29/2020 Active   omeprazole (PRILOSEC) 20 MG delayed-release capsule 10/29/2020 Active   ondansetron (ZOFRAN ODT) 4 MG TABLET DISPERSIBLE 10/29/2020 Active   OXcarbazepine (TRILEPTAL) 150 MG Tab 10/29/2020 Active   oxybutynin (DITROPAN) 5 MG Tab 10/29/2020 Active   potassium chloride SA (KDUR) 20 MEQ Tab CR 10/29/2020 Active   predniSONE (DELTASONE) 10 MG Tab 10/29/2020 Active   sodium bicarbonate (SODIUM BICARBONATE) 650 MG Tab 10/29/2020 Active   topiramate (TOPAMAX) 25 MG Tab 10/29/2020 Active   traZODone (DESYREL) 100 MG Tab 10/29/2020 Active   vitamin D, Ergocalciferol, (DRISDOL) 1.25 MG (02745 UT) Cap capsule 10/28/2020 Active   ziprasidone (GEODON) 40 MG Cap 10/29/2020 Active                ALLERGIES  Allergies   Allergen Reactions   • Depakote [Divalproex Sodium] Unspecified     Muscle spasms/muscle pain and weakness     • Amitriptyline Unspecified     Headaches     • Aripiprazole [Abilify] Unspecified     Headaches/muscle twitching     • Clindamycin Nausea     Even with food     • Flagyl [Metronidazole Hcl] Unspecified     \"eye problems\"     • Flomax [Tamsulosin Hydrochloride] Swelling   • Levaquin Unspecified     Severe muscle cramps in legs  RXN=unknown   • Metformin Unspecified     Increased lactic acid      • Tape Rash     Tears skin off  coban with Tegaderm tape ok intermittently  RXN=ongoing   • Vancomycin Itching     Pt becomes flushed in face and chest.   RXN=7/10/16   • Wound Dressing Adhesive Hives     By pt report   • Ciprofloxacin    • Hydromorphone Hcl      Pt states she feels loopy   • Keflex Rash     Pt states she gets a rash with this medication  Tolerates ceftriaxone   • Levofloxacin Unspecified     Leg muscle cramps   • " "Metronidazole Rash     .   • Penicillins Hives   • Tamsulosin Swelling   • Valproic Acid Rash     .       PHYSICAL EXAM  VITAL SIGNS: /53   Pulse (!) 57   Temp 36.4 °C (97.6 °F) (Temporal)   Resp 18   Ht 1.651 m (5' 5\")   Wt 115.5 kg (254 lb 10.1 oz)   SpO2 95%   Breastfeeding No   BMI 42.37 kg/m²   Pulse ox interpretation: I interpret this pulse ox as normal.  Constitutional: Alert in no apparent distress.  HENT: No signs of trauma, Bilateral external ears normal, Nose normal.   Eyes: Squeezing eyes closed, but with coaching, opens them, and has normal conjunctiva, PERRLA, EOMI.  Limited evaluation of the right retina, without dilation, is grossly unremarkable.  Neck: Normal range of motion, Supple, No stridor.   Lymphatic: No lymphadenopathy noted.   Cardiovascular: Regular rate and rhythm, no murmurs.   Thorax & Lungs: Normal breath sounds, No respiratory distress, No wheezing, No chest tenderness.   Abdomen: Bowel sounds normal, Soft, No tenderness, No masses, No pulsatile masses. No peritoneal signs.  Skin: Warm, Dry, No erythema, No rash.   Extremities: Intact distal pulses, No edema, No cyanosis.  Musculoskeletal: Good range of motion in all major joints. No or major deformities noted.   Neurologic: Alert , Normal motor function, Normal sensory function, No focal deficits noted.   Psychiatric: Affect normal, Judgment normal, Mood normal.     DIAGNOSTIC STUDIES / PROCEDURES        LABS  Labs Reviewed   CBC WITH DIFFERENTIAL - Abnormal; Notable for the following components:       Result Value    MCHC 31.0 (*)     Neutrophils-Polys 77.60 (*)     Lymphocytes 13.80 (*)     All other components within normal limits   COMP METABOLIC PANEL - Abnormal; Notable for the following components:    Chloride 114 (*)     Co2 16 (*)     Glucose 104 (*)     All other components within normal limits   CBC WITHOUT DIFFERENTIAL - Abnormal; Notable for the following components:    MCHC 31.4 (*)     All other " components within normal limits   BASIC METABOLIC PANEL - Abnormal; Notable for the following components:    Chloride 113 (*)     Co2 11 (*)     Glucose 179 (*)     All other components within normal limits   ACCU-CHEK GLUCOSE - Abnormal; Notable for the following components:    Glucose - Accu-Ck 170 (*)     All other components within normal limits   ACCU-CHEK GLUCOSE - Abnormal; Notable for the following components:    Glucose - Accu-Ck 145 (*)     All other components within normal limits   ACCU-CHEK GLUCOSE - Abnormal; Notable for the following components:    Glucose - Accu-Ck 124 (*)     All other components within normal limits   ACCU-CHEK GLUCOSE - Abnormal; Notable for the following components:    Glucose - Accu-Ck 191 (*)     All other components within normal limits   ACCU-CHEK GLUCOSE - Abnormal; Notable for the following components:    Glucose - Accu-Ck 155 (*)     All other components within normal limits   ACCU-CHEK GLUCOSE - Abnormal; Notable for the following components:    Glucose - Accu-Ck 128 (*)     All other components within normal limits   ACCU-CHEK GLUCOSE - Abnormal; Notable for the following components:    Glucose - Accu-Ck 133 (*)     All other components within normal limits   ACCU-CHEK GLUCOSE - Abnormal; Notable for the following components:    Glucose - Accu-Ck 140 (*)     All other components within normal limits   ACCU-CHEK GLUCOSE - Abnormal; Notable for the following components:    Glucose - Accu-Ck 123 (*)     All other components within normal limits   ACCU-CHEK GLUCOSE - Abnormal; Notable for the following components:    Glucose - Accu-Ck 126 (*)     All other components within normal limits   SED RATE   CRP QUANTITIVE (NON-CARDIAC)   COVID/SARS COV-2    Narrative:     Collected By:BEN PURCELL  Is patient being admitted?->Yes  Does this patient meet criteria for Rush/Cepheid per [facility]  Inpatient Workflow? (See workflow link below)->No  Expected turn around  time?->Routine (In-House PCR up to 24  hours)  Is this the patients First SARS CoV-2 test?->Unknown  Is this patient employed in healthcare?->Unknown  Is the patient symptomatic as defined by the CDC?->No  Is the patient hospitalized?->Yes  Is the patient in the ICU?->No  Is the patient a resident in a congregate care  setting?->Unknown  Is the patient pregnant?->Unknown   ESTIMATED GFR   SARS-COV-2, PCR (IN-HOUSE)    Narrative:     Collected By:BEN PURCELL  Is patient being admitted?->Yes  Does this patient meet criteria for Rush/Cepheid per [facility]  Inpatient Workflow? (See workflow link below)->No  Expected turn around time?->Routine (In-House PCR up to 24  hours)  Is this the patients First SARS CoV-2 test?->Unknown  Is this patient employed in healthcare?->Unknown  Is the patient symptomatic as defined by the CDC?->No  Is the patient hospitalized?->Yes  Is the patient in the ICU?->No  Is the patient a resident in a congregate care  setting?->Unknown  Is the patient pregnant?->Unknown   ESTIMATED GFR   TROPONIN    Narrative:     X 3 then stop   TROPONIN    Narrative:     X 3 then stop   TROPONIN    Narrative:     X 3 then stop     All labs reviewed by me.    RADIOLOGY  IR-US GUIDED PIV    (Results Pending)     The radiologist's interpretation of all radiological studies have been reviewed by me.    COURSE & MEDICAL DECISION MAKING  Nursing notes, VS, PMSFHx reviewed in chart.     9:27 PM Patient seen and examined at bedside.  Given her history, there is a very high chance that she is having optic neuritis flare.  I have ordered appropriate screening labs, but she will need imaging evaluation for confirmation.  She has had far too many recent and lifetime CT scans.  I think the most appropriate course of action is to admit her to the hospital and start high dose IV steroids.  I have ordered a gram of IV Solu-Medrol, and will page the hospitalist for admission.    10:45 PM Dr. Albrecht is in the  emergency department, agrees to admit, and will evaluate the patient at the bedside shortly.    DISPOSITION:  Patient will be admitted to Dr. Albrecht in stable condition.      FINAL IMPRESSION  1. Optic neuritis (rule out)  2. Eye pain

## 2020-10-30 NOTE — PROGRESS NOTES
Hospital Medicine Daily Progress Note    Date of Service  10/30/2020    Chief Complaint  30 y.o. female admitted 10/29/2020 with eye pain    Hospital Course    Patient is a 30 year old woman with history of right sided optic neuritis, sleep apnema, depression, multiple personality disorder and psychosis among other chronic issues who has been followed by Dr. Yousif of neuro ophthalmology.  She reportedly ran out of her chronic cellcept last week and then presented to the ER with acute right eye pain and dizziness, consistent with previous symptoms of her optic neuritis.  She was started on high dose iv steroids and oral cellcept was also restarted.      Interval Problem Update  She is axox3 but somewhat reluctant to cooperate fully with exam.  She denies headache, denies n/v, she does report 6/10 right eye pain and mild photophobia.  She denies hallucinations, denies SI, HI, denies cp, denies sob.  ROS otherwise negative.    I contacted Dr. Yousif's office and spoke with his  who stated he is out of the office for another week but that she would call him on his cell phone and deliver my message and that I should expect a call back    Ros otherwise negative  Discussed with nursing and case management    Consultants/Specialty  Awaiting call back from neuro ophthalmology    Code Status  Full Code    Disposition  Tbd    Review of Systems  Review of Systems   Constitutional: Negative.  Negative for chills, diaphoresis, fever, malaise/fatigue and weight loss.   HENT: Negative.  Negative for sore throat.    Eyes: Positive for blurred vision, photophobia and pain. Negative for double vision, discharge and redness.   Respiratory: Negative.  Negative for cough and shortness of breath.    Cardiovascular: Negative.  Negative for chest pain, palpitations and leg swelling.   Gastrointestinal: Negative.  Negative for abdominal pain, nausea and vomiting.   Genitourinary: Negative.  Negative for dysuria.  "  Musculoskeletal: Negative.  Negative for myalgias.   Skin: Negative.  Negative for itching and rash.   Neurological: Negative.  Negative for dizziness, focal weakness, weakness and headaches.   Endo/Heme/Allergies: Negative.  Does not bruise/bleed easily.   Psychiatric/Behavioral: Negative.  Negative for depression, substance abuse and suicidal ideas.   All other systems reviewed and are negative.       Physical Exam  Temp:  [36.4 °C (97.5 °F)-36.8 °C (98.2 °F)] 36.8 °C (98.2 °F)  Pulse:  [68-86] 80  Resp:  [16-18] 18  BP: (111-166)/(53-87) 111/53  SpO2:  [92 %-97 %] 94 %    Physical Exam  Vitals signs and nursing note reviewed. Exam conducted with a chaperone present.   Constitutional:       General: She is not in acute distress.     Appearance: Normal appearance. She is not diaphoretic.   HENT:      Head: Normocephalic.      Nose: Nose normal.      Mouth/Throat:      Mouth: Mucous membranes are moist.   Eyes:      Pupils: Pupils are equal, round, and reactive to light.      Comments: Difficult exam she states she cannot open either eye by herself - she allowed me to open both and when I did she said \"oh hi!, on my right it looks all blurry\". No redness or discharge, pupil reactive   Cardiovascular:      Rate and Rhythm: Normal rate and regular rhythm.      Pulses: Normal pulses.      Heart sounds: Normal heart sounds.   Pulmonary:      Effort: Pulmonary effort is normal.      Breath sounds: Normal breath sounds.   Abdominal:      General: Abdomen is flat. Bowel sounds are normal.      Palpations: Abdomen is soft.   Musculoskeletal: Normal range of motion.         General: No swelling or deformity.   Skin:     General: Skin is warm and dry.      Capillary Refill: Capillary refill takes less than 2 seconds.   Neurological:      General: No focal deficit present.      Mental Status: She is alert and oriented to person, place, and time.      Cranial Nerves: No cranial nerve deficit.   Psychiatric:         Mood and " Affect: Mood normal.         Behavior: Behavior normal.         Fluids    Intake/Output Summary (Last 24 hours) at 10/30/2020 1315  Last data filed at 10/30/2020 0648  Gross per 24 hour   Intake --   Output 300 ml   Net -300 ml       Laboratory  Recent Labs     10/29/20  2240 10/30/20  0604   WBC 7.5 7.3   RBC 4.70 4.70   HEMOGLOBIN 13.1 13.2   HEMATOCRIT 42.2 42.1   MCV 89.8 89.6   MCH 27.9 28.1   MCHC 31.0* 31.4*   RDW 46.5 44.5   PLATELETCT 184 176   MPV 11.9 11.4     Recent Labs     10/29/20  2240 10/30/20  0604   SODIUM 142 138   POTASSIUM 4.0 4.1   CHLORIDE 114* 113*   CO2 16* 11*   GLUCOSE 104* 179*   BUN 8 9   CREATININE 0.68 0.63   CALCIUM 9.0 8.8                   Imaging  No orders to display        Assessment/Plan  Optic neuritis- (present on admission)  Assessment & Plan  Recurrent, follows as an outpatient with Benja - awaiting call back to discuss tx regimen  Ran out of her CellCept - now restarted  Onhigh-dose Solu-Medrol, 1 g daily  Refusing CT scans    Diabetes mellitus type 2 in obese (HCC)- (present on admission)  Assessment & Plan  Continue home Lantus  continue insulin sliding scale  Bs increased with IV steroids  following    Schizoaffective disorder, depressive type (HCC)- (present on admission)  Assessment & Plan  -Continue home meds    Hypothyroidism- (present on admission)  Assessment & Plan  Continue home Synthroid at 75 mcg daily      GERD (gastroesophageal reflux disease)- (present on admission)  Assessment & Plan  Continue home omeprazole    Morbidly obese (HCC)- (present on admission)  Assessment & Plan  -Chronic, needs diet and exercise adjustment post discharge       VTE prophylaxis: scd

## 2020-10-30 NOTE — DISCHARGE PLANNING
"Hospital Care Management Discharge Planning        Anticipated Discharge Disposition:   · Home     Action:   · RN CM anticipates no DC nees at this time.      Barriers to Discharge:   · Medical clearence     Plan:    · Hospital Care Management Team to continue to provide support services and assistance with discharge planning as needed.         Current Expected Day of Discharge: 10/31         For further assistance please contact the assigned RN Case Manager or  at the extension listed under \"Treatment Teams\".  "

## 2020-10-30 NOTE — ASSESSMENT & PLAN NOTE
Recurrent, follows as an outpatient with Benja - awaiting call back to discuss tx regimen - did discuss with Joy who recommended complete the 5 days of the high dose steroids and then follow up with Benja as an outpatinet  Ran out of her CellCept - now restarted  Onhigh-dose Solu-Medrol, 1 g daily - see above, neuro recommends continue for total of 5 days - currently day 4/5  Symptoms continue to improve  Continue supportive care

## 2020-10-30 NOTE — ED TRIAGE NOTES
Kristin Balderrama  30 y.o.  Chief Complaint   Patient presents with   • Eye Pain     Pt SUSAN STALLWORTH from home with c/o eye pain.  Pt has a history of optic neuritis and ran out of her cellcept a week and a half ago.  Pt also fell today and thinks that the fall is contributing to her eye pain.  Pt rates her eye pain 8/10 and states it is too painful to open her eyes.  Pt also rates back pain 8/10.

## 2020-10-31 LAB
EKG IMPRESSION: NORMAL
GLUCOSE BLD-MCNC: 128 MG/DL (ref 65–99)
GLUCOSE BLD-MCNC: 133 MG/DL (ref 65–99)
GLUCOSE BLD-MCNC: 155 MG/DL (ref 65–99)
GLUCOSE BLD-MCNC: 191 MG/DL (ref 65–99)
TROPONIN T SERPL-MCNC: <6 NG/L (ref 6–19)
TROPONIN T SERPL-MCNC: <6 NG/L (ref 6–19)

## 2020-10-31 PROCEDURE — 700111 HCHG RX REV CODE 636 W/ 250 OVERRIDE (IP): Performed by: INTERNAL MEDICINE

## 2020-10-31 PROCEDURE — 36415 COLL VENOUS BLD VENIPUNCTURE: CPT

## 2020-10-31 PROCEDURE — 93005 ELECTROCARDIOGRAM TRACING: CPT | Performed by: HOSPITALIST

## 2020-10-31 PROCEDURE — 770006 HCHG ROOM/CARE - MED/SURG/GYN SEMI*

## 2020-10-31 PROCEDURE — A9270 NON-COVERED ITEM OR SERVICE: HCPCS | Performed by: HOSPITALIST

## 2020-10-31 PROCEDURE — 84484 ASSAY OF TROPONIN QUANT: CPT | Mod: 91

## 2020-10-31 PROCEDURE — A9270 NON-COVERED ITEM OR SERVICE: HCPCS | Performed by: INTERNAL MEDICINE

## 2020-10-31 PROCEDURE — 700102 HCHG RX REV CODE 250 W/ 637 OVERRIDE(OP): Performed by: INTERNAL MEDICINE

## 2020-10-31 PROCEDURE — 700105 HCHG RX REV CODE 258: Performed by: INTERNAL MEDICINE

## 2020-10-31 PROCEDURE — 93010 ELECTROCARDIOGRAM REPORT: CPT | Performed by: INTERNAL MEDICINE

## 2020-10-31 PROCEDURE — 700102 HCHG RX REV CODE 250 W/ 637 OVERRIDE(OP): Performed by: HOSPITALIST

## 2020-10-31 PROCEDURE — 82962 GLUCOSE BLOOD TEST: CPT

## 2020-10-31 PROCEDURE — 99232 SBSQ HOSP IP/OBS MODERATE 35: CPT | Performed by: HOSPITALIST

## 2020-10-31 RX ORDER — ALBUTEROL SULFATE 90 UG/1
2 AEROSOL, METERED RESPIRATORY (INHALATION) EVERY 6 HOURS PRN
Status: DISCONTINUED | OUTPATIENT
Start: 2020-10-31 | End: 2020-11-03 | Stop reason: HOSPADM

## 2020-10-31 RX ADMIN — BUSPIRONE HYDROCHLORIDE 10 MG: 5 TABLET ORAL at 05:02

## 2020-10-31 RX ADMIN — ZIPRASIDONE HYDROCHLORIDE 40 MG: 20 CAPSULE ORAL at 04:59

## 2020-10-31 RX ADMIN — ZIPRASIDONE HYDROCHLORIDE 40 MG: 20 CAPSULE ORAL at 17:01

## 2020-10-31 RX ADMIN — ASPIRIN 81 MG: 81 TABLET, COATED ORAL at 05:02

## 2020-10-31 RX ADMIN — GABAPENTIN 300 MG: 300 CAPSULE ORAL at 17:06

## 2020-10-31 RX ADMIN — ACETAMINOPHEN 650 MG: 325 TABLET, FILM COATED ORAL at 20:07

## 2020-10-31 RX ADMIN — SODIUM CHLORIDE 1000 MG: 9 INJECTION, SOLUTION INTRAVENOUS at 17:07

## 2020-10-31 RX ADMIN — OMEPRAZOLE 20 MG: 20 CAPSULE, DELAYED RELEASE ORAL at 05:00

## 2020-10-31 RX ADMIN — OXCARBAZEPINE 150 MG: 300 TABLET, FILM COATED ORAL at 17:02

## 2020-10-31 RX ADMIN — TRAZODONE HYDROCHLORIDE 100 MG: 50 TABLET ORAL at 17:06

## 2020-10-31 RX ADMIN — ACETAZOLAMIDE 1000 MG: 250 TABLET ORAL at 05:00

## 2020-10-31 RX ADMIN — ACETAMINOPHEN 650 MG: 325 TABLET, FILM COATED ORAL at 09:58

## 2020-10-31 RX ADMIN — ONDANSETRON HYDROCHLORIDE 4 MG: 2 SOLUTION INTRAMUSCULAR; INTRAVENOUS at 15:07

## 2020-10-31 RX ADMIN — ENOXAPARIN SODIUM 40 MG: 40 INJECTION SUBCUTANEOUS at 05:04

## 2020-10-31 RX ADMIN — SENNOSIDES-DOCUSATE SODIUM TAB 8.6-50 MG 2 TABLET: 8.6-5 TAB at 04:59

## 2020-10-31 RX ADMIN — SODIUM BICARBONATE 650 MG TABLET 1950 MG: at 05:10

## 2020-10-31 RX ADMIN — FLUOXETINE 40 MG: 20 CAPSULE ORAL at 04:58

## 2020-10-31 RX ADMIN — LEVOTHYROXINE SODIUM 75 MCG: 0.07 TABLET ORAL at 05:02

## 2020-10-31 RX ADMIN — MYCOPHENOLATE MOFETIL 1000 MG: 250 CAPSULE ORAL at 23:19

## 2020-10-31 RX ADMIN — INSULIN HUMAN 1 UNITS: 100 INJECTION, SOLUTION PARENTERAL at 10:38

## 2020-10-31 RX ADMIN — BUSPIRONE HYDROCHLORIDE 10 MG: 5 TABLET ORAL at 17:05

## 2020-10-31 RX ADMIN — INSULIN HUMAN 1 UNITS: 100 INJECTION, SOLUTION PARENTERAL at 06:47

## 2020-10-31 RX ADMIN — ACETAZOLAMIDE 1000 MG: 250 TABLET ORAL at 17:01

## 2020-10-31 RX ADMIN — OXYBUTYNIN CHLORIDE 5 MG: 5 TABLET ORAL at 09:58

## 2020-10-31 RX ADMIN — TOPIRAMATE 50 MG: 25 TABLET, FILM COATED ORAL at 17:05

## 2020-10-31 RX ADMIN — OXYBUTYNIN CHLORIDE 5 MG: 5 TABLET ORAL at 17:04

## 2020-10-31 RX ADMIN — SENNOSIDES-DOCUSATE SODIUM TAB 8.6-50 MG 2 TABLET: 8.6-5 TAB at 17:06

## 2020-10-31 RX ADMIN — SODIUM BICARBONATE 650 MG TABLET 1950 MG: at 17:00

## 2020-10-31 RX ADMIN — OXCARBAZEPINE 150 MG: 300 TABLET, FILM COATED ORAL at 05:02

## 2020-10-31 RX ADMIN — ALBUTEROL SULFATE 2 PUFF: 90 AEROSOL, METERED RESPIRATORY (INHALATION) at 15:54

## 2020-10-31 RX ADMIN — ONDANSETRON HYDROCHLORIDE 4 MG: 2 SOLUTION INTRAMUSCULAR; INTRAVENOUS at 09:59

## 2020-10-31 RX ADMIN — MYCOPHENOLATE MOFETIL 1000 MG: 250 CAPSULE ORAL at 10:41

## 2020-10-31 RX ADMIN — LIDOCAINE HYDROCHLORIDE 15 ML: 20 SOLUTION OROPHARYNGEAL at 15:53

## 2020-10-31 RX ADMIN — OXYBUTYNIN CHLORIDE 5 MG: 5 TABLET ORAL at 14:09

## 2020-10-31 ASSESSMENT — ENCOUNTER SYMPTOMS
DIAPHORESIS: 0
SHORTNESS OF BREATH: 0
RESPIRATORY NEGATIVE: 1
VOMITING: 0
CONSTITUTIONAL NEGATIVE: 1
NEUROLOGICAL NEGATIVE: 1
MUSCULOSKELETAL NEGATIVE: 1
EYE REDNESS: 0
DIZZINESS: 0
EYE PAIN: 1
HEADACHES: 0
BRUISES/BLEEDS EASILY: 0
PALPITATIONS: 0
SORE THROAT: 0
GASTROINTESTINAL NEGATIVE: 1
DOUBLE VISION: 0
COUGH: 0
WEIGHT LOSS: 0
CARDIOVASCULAR NEGATIVE: 1
PHOTOPHOBIA: 1
DEPRESSION: 0
FOCAL WEAKNESS: 0
MYALGIAS: 0
WEAKNESS: 0
PSYCHIATRIC NEGATIVE: 1
EYE DISCHARGE: 0
FEVER: 0
ABDOMINAL PAIN: 0
BLURRED VISION: 1
CHILLS: 0
NAUSEA: 0

## 2020-10-31 ASSESSMENT — LIFESTYLE VARIABLES: SUBSTANCE_ABUSE: 0

## 2020-10-31 ASSESSMENT — PAIN DESCRIPTION - PAIN TYPE
TYPE: ACUTE PAIN

## 2020-10-31 NOTE — CARE PLAN
Problem: Communication  Goal: The ability to communicate needs accurately and effectively will improve  Outcome: PROGRESSING AS EXPECTED  Note: Pt is able to communicate needs appropriately to staff. Pt educated to use call light when in need of assistance. Call light is within reach.     Problem: Safety  Goal: Will remain free from falls  Outcome: PROGRESSING AS EXPECTED     Problem: Urinary Elimination:  Goal: Ability to reestablish a normal urinary elimination pattern will improve  Outcome: PROGRESSING AS EXPECTED

## 2020-10-31 NOTE — PROGRESS NOTES
Report received from day RN. Pt AAO. Reports nausea, but declines medication only ask for ginger ale. Denies any pain, SOB, chills. POC discussed with pt including medication, diet, safety, oral care. Questions and concerns answered. Pt verbalized understanding. Safety precautions in place. No other needs at this time.

## 2020-10-31 NOTE — PROGRESS NOTES
Pt stating pain in chest 8/10. Vitals taken BP: 99/54 HR: 68 SP02: 95% on RA. Notified Dr. Gil at 1520.

## 2020-10-31 NOTE — CARE PLAN
Problem: Communication  Goal: The ability to communicate needs accurately and effectively will improve  Outcome: PROGRESSING AS EXPECTED   Pt able to communicate needs appropriately to staff. Plan of care discussed to pt. Questions and concerns answered. Pt. Verbalized understanding. Communication board updated.     Problem: Safety  Goal: Will remain free from injury  Outcome: PROGRESSING AS EXPECTED   Pt educated to call when in need. Call light and personal belongings w/n reach. Room free of clutters. Bed locked and in lowest position. Answer call light immediately.     Problem: Pain Management  Goal: Pain level will decrease to patient's comfort goal  Outcome: PROGRESSING AS EXPECTED   Pt denies any pain at the moment. Encouraged to inform RN if pain is greater than comfort level.

## 2020-10-31 NOTE — PROGRESS NOTES
Received report from night shift nurse at 0656. Pt is resting in bed. Pt is awake and alert. No signs of distress. Pt denies any chest pain, sob, numbness or tingling. Pt reports nausea, pt medicated per MAR. Suprapubic jara catheter in place. Plan of care reviewed. Fall precautions in place. Hourly rounding in place.

## 2020-10-31 NOTE — PROGRESS NOTES
Hospital Medicine Daily Progress Note    Date of Service  10/31/2020    Chief Complaint  30 y.o. female admitted 10/29/2020 with eye pain    Hospital Course    Patient is a 30 year old woman with history of right sided optic neuritis, sleep apnema, depression, multiple personality disorder and psychosis among other chronic issues who has been followed by Dr. Yousif of neuro ophthalmology.  She reportedly ran out of her chronic cellcept last week and then presented to the ER with acute right eye pain and dizziness, consistent with previous symptoms of her optic neuritis.  She was started on high dose iv steroids and oral cellcept was also restarted.      Interval Problem Update  She continues to be a difficult historian and refused to open her eyes, she does allow me to open them and they remain reactive, free of discharge or redness, she reports ongoing photophobia and ongoing blurred vision in right eye, she reports ongoing right eye pain unchanged and now mild pain in L eye.  Multiple messages left for Dr. Yousif, still no call back.  I did consult the neurohospitalist Dr. Hamilton who reviewed the case and recommended the 5 day course of iv steroids - he reports she had normal vision evoked responses and has had over 10 neurology consults over the past year - no further work up recommended at this time  She denies other symptoms  axox3  Ros otherwise negative  Discussed with nursing and case management    Consultants/Specialty  Awaiting call back from neuro ophthalmology  Neurology    Code Status  Full Code    Disposition  Tbd    Review of Systems  Review of Systems   Constitutional: Negative.  Negative for chills, diaphoresis, fever, malaise/fatigue and weight loss.   HENT: Negative.  Negative for sore throat.    Eyes: Positive for blurred vision, photophobia and pain. Negative for double vision, discharge and redness.   Respiratory: Negative.  Negative for cough and shortness of breath.    Cardiovascular:  Negative.  Negative for chest pain, palpitations and leg swelling.   Gastrointestinal: Negative.  Negative for abdominal pain, nausea and vomiting.   Genitourinary: Negative.  Negative for dysuria.   Musculoskeletal: Negative.  Negative for myalgias.   Skin: Negative.  Negative for itching and rash.   Neurological: Negative.  Negative for dizziness, focal weakness, weakness and headaches.   Endo/Heme/Allergies: Negative.  Does not bruise/bleed easily.   Psychiatric/Behavioral: Negative.  Negative for depression, substance abuse and suicidal ideas.   All other systems reviewed and are negative.       Physical Exam  Temp:  [36.7 °C (98 °F)-37.1 °C (98.7 °F)] 37.1 °C (98.7 °F)  Pulse:  [64-71] 65  Resp:  [18-20] 20  BP: (105-123)/(52-55) 123/53  SpO2:  [90 %-96 %] 96 %    Physical Exam  Vitals signs and nursing note reviewed. Exam conducted with a chaperone present.   Constitutional:       General: She is not in acute distress.     Appearance: Normal appearance. She is not diaphoretic.   HENT:      Head: Normocephalic.      Nose: Nose normal.      Mouth/Throat:      Mouth: Mucous membranes are moist.   Eyes:      Pupils: Pupils are equal, round, and reactive to light.      Comments: Continues to be a difficult exam but both eye remain grossly normal on exam   Cardiovascular:      Rate and Rhythm: Normal rate and regular rhythm.      Pulses: Normal pulses.      Heart sounds: Normal heart sounds.   Pulmonary:      Effort: Pulmonary effort is normal.      Breath sounds: Normal breath sounds.   Abdominal:      General: Abdomen is flat. Bowel sounds are normal.      Palpations: Abdomen is soft.   Musculoskeletal: Normal range of motion.         General: No swelling or deformity.   Skin:     General: Skin is warm and dry.      Capillary Refill: Capillary refill takes less than 2 seconds.   Neurological:      General: No focal deficit present.      Mental Status: She is alert and oriented to person, place, and time.       Cranial Nerves: No cranial nerve deficit.   Psychiatric:         Mood and Affect: Mood normal.         Behavior: Behavior normal.         Fluids    Intake/Output Summary (Last 24 hours) at 10/31/2020 1351  Last data filed at 10/31/2020 0100  Gross per 24 hour   Intake 626.67 ml   Output 500 ml   Net 126.67 ml       Laboratory  Recent Labs     10/29/20  2240 10/30/20  0604   WBC 7.5 7.3   RBC 4.70 4.70   HEMOGLOBIN 13.1 13.2   HEMATOCRIT 42.2 42.1   MCV 89.8 89.6   MCH 27.9 28.1   MCHC 31.0* 31.4*   RDW 46.5 44.5   PLATELETCT 184 176   MPV 11.9 11.4     Recent Labs     10/29/20  2240 10/30/20  0604   SODIUM 142 138   POTASSIUM 4.0 4.1   CHLORIDE 114* 113*   CO2 16* 11*   GLUCOSE 104* 179*   BUN 8 9   CREATININE 0.68 0.63   CALCIUM 9.0 8.8                   Imaging  No orders to display        Assessment/Plan  Optic neuritis- (present on admission)  Assessment & Plan  Recurrent, follows as an outpatient with Benja - awaiting call back to discuss tx regimen  Ran out of her CellCept - now restarted  Onhigh-dose Solu-Medrol, 1 g daily - see above, neuro recommends continue for total of 5 days  Continue supportive care    Diabetes mellitus type 2 in obese (HCC)- (present on admission)  Assessment & Plan  Continue home Lantus  continue insulin sliding scale  Bs increased with IV steroids  Continue to follow    Schizoaffective disorder, depressive type (HCC)- (present on admission)  Assessment & Plan  Continue home meds    Hypothyroidism- (present on admission)  Assessment & Plan  Continue home Synthroid at 75 mcg daily      GERD (gastroesophageal reflux disease)- (present on admission)  Assessment & Plan  Continue home omeprazole    Morbidly obese (HCC)- (present on admission)  Assessment & Plan  Chronic, needs diet and exercise adjustment post discharge       VTE prophylaxis: scd
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
## 2020-11-01 LAB
GLUCOSE BLD-MCNC: 123 MG/DL (ref 65–99)
GLUCOSE BLD-MCNC: 126 MG/DL (ref 65–99)
GLUCOSE BLD-MCNC: 127 MG/DL (ref 65–99)
GLUCOSE BLD-MCNC: 140 MG/DL (ref 65–99)
TROPONIN T SERPL-MCNC: 7 NG/L (ref 6–19)

## 2020-11-01 PROCEDURE — 700105 HCHG RX REV CODE 258: Performed by: INTERNAL MEDICINE

## 2020-11-01 PROCEDURE — A9270 NON-COVERED ITEM OR SERVICE: HCPCS | Performed by: INTERNAL MEDICINE

## 2020-11-01 PROCEDURE — 770006 HCHG ROOM/CARE - MED/SURG/GYN SEMI*

## 2020-11-01 PROCEDURE — 36415 COLL VENOUS BLD VENIPUNCTURE: CPT

## 2020-11-01 PROCEDURE — 99232 SBSQ HOSP IP/OBS MODERATE 35: CPT | Performed by: HOSPITALIST

## 2020-11-01 PROCEDURE — 84484 ASSAY OF TROPONIN QUANT: CPT

## 2020-11-01 PROCEDURE — 82962 GLUCOSE BLOOD TEST: CPT | Mod: 91

## 2020-11-01 PROCEDURE — 700111 HCHG RX REV CODE 636 W/ 250 OVERRIDE (IP): Performed by: INTERNAL MEDICINE

## 2020-11-01 PROCEDURE — 700102 HCHG RX REV CODE 250 W/ 637 OVERRIDE(OP): Performed by: INTERNAL MEDICINE

## 2020-11-01 RX ADMIN — ONDANSETRON HYDROCHLORIDE 4 MG: 2 SOLUTION INTRAMUSCULAR; INTRAVENOUS at 02:01

## 2020-11-01 RX ADMIN — INSULIN GLARGINE 12 UNITS: 100 INJECTION, SOLUTION SUBCUTANEOUS at 16:55

## 2020-11-01 RX ADMIN — ZIPRASIDONE HYDROCHLORIDE 40 MG: 20 CAPSULE ORAL at 06:27

## 2020-11-01 RX ADMIN — SODIUM BICARBONATE 650 MG TABLET 1950 MG: at 07:22

## 2020-11-01 RX ADMIN — ACETAMINOPHEN 650 MG: 325 TABLET, FILM COATED ORAL at 11:04

## 2020-11-01 RX ADMIN — SODIUM BICARBONATE 650 MG TABLET 1950 MG: at 17:10

## 2020-11-01 RX ADMIN — MYCOPHENOLATE MOFETIL 1000 MG: 250 CAPSULE ORAL at 20:24

## 2020-11-01 RX ADMIN — BUSPIRONE HYDROCHLORIDE 10 MG: 5 TABLET ORAL at 06:27

## 2020-11-01 RX ADMIN — OXCARBAZEPINE 150 MG: 300 TABLET, FILM COATED ORAL at 07:33

## 2020-11-01 RX ADMIN — ACETAZOLAMIDE 1000 MG: 250 TABLET ORAL at 17:11

## 2020-11-01 RX ADMIN — MYCOPHENOLATE MOFETIL 1000 MG: 250 CAPSULE ORAL at 11:04

## 2020-11-01 RX ADMIN — OXCARBAZEPINE 150 MG: 300 TABLET, FILM COATED ORAL at 17:12

## 2020-11-01 RX ADMIN — TOPIRAMATE 50 MG: 25 TABLET, FILM COATED ORAL at 17:10

## 2020-11-01 RX ADMIN — ASPIRIN 81 MG: 81 TABLET, COATED ORAL at 06:28

## 2020-11-01 RX ADMIN — OXYBUTYNIN CHLORIDE 5 MG: 5 TABLET ORAL at 11:04

## 2020-11-01 RX ADMIN — METHOCARBAMOL TABLETS 750 MG: 500 TABLET, COATED ORAL at 07:22

## 2020-11-01 RX ADMIN — ZIPRASIDONE HYDROCHLORIDE 40 MG: 20 CAPSULE ORAL at 17:07

## 2020-11-01 RX ADMIN — LEVOTHYROXINE SODIUM 75 MCG: 0.07 TABLET ORAL at 06:28

## 2020-11-01 RX ADMIN — TRAZODONE HYDROCHLORIDE 100 MG: 50 TABLET ORAL at 17:07

## 2020-11-01 RX ADMIN — OMEPRAZOLE 20 MG: 20 CAPSULE, DELAYED RELEASE ORAL at 06:27

## 2020-11-01 RX ADMIN — OXYBUTYNIN CHLORIDE 5 MG: 5 TABLET ORAL at 07:33

## 2020-11-01 RX ADMIN — ACETAMINOPHEN 650 MG: 325 TABLET, FILM COATED ORAL at 18:36

## 2020-11-01 RX ADMIN — ONDANSETRON HYDROCHLORIDE 4 MG: 2 SOLUTION INTRAMUSCULAR; INTRAVENOUS at 12:34

## 2020-11-01 RX ADMIN — FLUOXETINE 40 MG: 20 CAPSULE ORAL at 06:28

## 2020-11-01 RX ADMIN — ACETAZOLAMIDE 1500 MG: 250 TABLET ORAL at 06:28

## 2020-11-01 RX ADMIN — GABAPENTIN 300 MG: 300 CAPSULE ORAL at 17:11

## 2020-11-01 RX ADMIN — METHOCARBAMOL TABLETS 750 MG: 500 TABLET, COATED ORAL at 18:39

## 2020-11-01 RX ADMIN — ENOXAPARIN SODIUM 40 MG: 40 INJECTION SUBCUTANEOUS at 06:28

## 2020-11-01 RX ADMIN — BUSPIRONE HYDROCHLORIDE 10 MG: 5 TABLET ORAL at 17:12

## 2020-11-01 RX ADMIN — SODIUM CHLORIDE 1000 MG: 9 INJECTION, SOLUTION INTRAVENOUS at 17:13

## 2020-11-01 RX ADMIN — OXYBUTYNIN CHLORIDE 5 MG: 5 TABLET ORAL at 17:11

## 2020-11-01 RX ADMIN — ONDANSETRON HYDROCHLORIDE 4 MG: 2 SOLUTION INTRAMUSCULAR; INTRAVENOUS at 22:02

## 2020-11-01 RX ADMIN — ALBUTEROL SULFATE 2 PUFF: 90 AEROSOL, METERED RESPIRATORY (INHALATION) at 16:54

## 2020-11-01 ASSESSMENT — ENCOUNTER SYMPTOMS
NEUROLOGICAL NEGATIVE: 1
DEPRESSION: 0
COUGH: 0
WEIGHT LOSS: 0
VOMITING: 0
BRUISES/BLEEDS EASILY: 0
HEADACHES: 0
MUSCULOSKELETAL NEGATIVE: 1
DIZZINESS: 0
PSYCHIATRIC NEGATIVE: 1
BLURRED VISION: 1
DIAPHORESIS: 0
FOCAL WEAKNESS: 0
RESPIRATORY NEGATIVE: 1
FEVER: 0
EYE PAIN: 1
NAUSEA: 0
WEAKNESS: 0
EYE DISCHARGE: 0
PALPITATIONS: 0
MYALGIAS: 0
SHORTNESS OF BREATH: 0
PHOTOPHOBIA: 1
CONSTITUTIONAL NEGATIVE: 1
SORE THROAT: 0
EYE REDNESS: 0
GASTROINTESTINAL NEGATIVE: 1
CHILLS: 0
ABDOMINAL PAIN: 0
DOUBLE VISION: 0

## 2020-11-01 ASSESSMENT — FIBROSIS 4 INDEX: FIB4 SCORE: 0.56

## 2020-11-01 ASSESSMENT — LIFESTYLE VARIABLES: SUBSTANCE_ABUSE: 0

## 2020-11-01 ASSESSMENT — PAIN DESCRIPTION - PAIN TYPE: TYPE: ACUTE PAIN

## 2020-11-01 NOTE — CARE PLAN
Problem: Knowledge Deficit  Goal: Knowledge of disease process/condition, treatment plan, diagnostic tests, and medications will improve  Outcome: PROGRESSING SLOWER THAN EXPECTED  Goal: Knowledge of the prescribed therapeutic regimen will improve  Outcome: PROGRESSING SLOWER THAN EXPECTED  Aware of plan of care at this time but has to make sure meds are obtained by caregiver to prevent a relapse.

## 2020-11-01 NOTE — CARE PLAN
Problem: Communication  Goal: The ability to communicate needs accurately and effectively will improve  Outcome: PROGRESSING AS EXPECTED  --> Pt states needs clearly, asks questions when needed. Uses call light appropriately.     Problem: Safety  Goal: Will remain free from injury  Outcome: PROGRESSING AS EXPECTED  Intervention: Provide assistance with mobility  Flowsheets (Taken 10/31/2020 4875)  Assistance: Standby Assist  Ambulation Tolerance: Tolerates Well  Goal: Will remain free from falls  Outcome: PROGRESSING AS EXPECTED  -->Pt uses call light appropriately, bed is locked and in lowest position, belongings and call light within reach.

## 2020-11-01 NOTE — PROGRESS NOTES
"Hospital Medicine Daily Progress Note    Date of Service  11/1/2020    Chief Complaint  30 y.o. female admitted 10/29/2020 with eye pain    Hospital Course    Patient is a 30 year old woman with history of right sided optic neuritis, sleep apnema, depression, multiple personality disorder and psychosis among other chronic issues who has been followed by Dr. Yousif of neuro ophthalmology.  She reportedly ran out of her chronic cellcept last week and then presented to the ER with acute right eye pain and dizziness, consistent with previous symptoms of her optic neuritis.  She was started on high dose iv steroids and oral cellcept was also restarted.      Interval Problem Update  She reported atypical chest pain yesterday, no acute changes on ekg and serial trops negative, no significant findings on tele, constant and characteristics of gerd, much improved with GI cocktail, nearly resolved today, suspect iv steroids contributing - continue PPI  She reports her vision is \"getting better\", she voluntarily opens her eyes today, reports the right eye blurriness is improving along with pain, no sx in the left eye today, denies headache, denies hallucinations, no sob, no sob  Another message left with Benja's office - no call back  She is thankful and polite   axox3  Ros otherwise negative  Discussed with nursing and case mgt    Consultants/Specialty  Still awaiting call back from neuro ophthalmology  Neurology    Code Status  Full Code    Disposition  Tbd    Review of Systems  Review of Systems   Constitutional: Negative.  Negative for chills, diaphoresis, fever, malaise/fatigue and weight loss.   HENT: Negative.  Negative for sore throat.    Eyes: Positive for blurred vision, photophobia and pain. Negative for double vision, discharge and redness.   Respiratory: Negative.  Negative for cough and shortness of breath.    Cardiovascular: Positive for chest pain (atypical). Negative for palpitations and leg swelling. "   Gastrointestinal: Negative.  Negative for abdominal pain, nausea and vomiting.   Genitourinary: Negative.  Negative for dysuria.   Musculoskeletal: Negative.  Negative for myalgias.   Skin: Negative.  Negative for itching and rash.   Neurological: Negative.  Negative for dizziness, focal weakness, weakness and headaches.   Endo/Heme/Allergies: Negative.  Does not bruise/bleed easily.   Psychiatric/Behavioral: Negative.  Negative for depression, substance abuse and suicidal ideas.   All other systems reviewed and are negative.       Physical Exam  Temp:  [36.7 °C (98.1 °F)-36.8 °C (98.3 °F)] 36.8 °C (98.2 °F)  Pulse:  [45-70] 70  Resp:  [18] 18  BP: ()/(40-54) 103/40  SpO2:  [93 %-96 %] 93 %    Physical Exam  Vitals signs and nursing note reviewed. Exam conducted with a chaperone present.   Constitutional:       General: She is not in acute distress.     Appearance: Normal appearance. She is not diaphoretic.   HENT:      Head: Normocephalic.      Nose: Nose normal.      Mouth/Throat:      Mouth: Mucous membranes are moist.   Eyes:      General:         Right eye: No discharge.         Left eye: No discharge.      Extraocular Movements: Extraocular movements intact.      Conjunctiva/sclera: Conjunctivae normal.      Pupils: Pupils are equal, round, and reactive to light.   Cardiovascular:      Rate and Rhythm: Normal rate and regular rhythm.      Pulses: Normal pulses.      Heart sounds: Normal heart sounds.   Pulmonary:      Effort: Pulmonary effort is normal.      Breath sounds: Normal breath sounds.   Abdominal:      General: Abdomen is flat. Bowel sounds are normal.      Palpations: Abdomen is soft.   Musculoskeletal: Normal range of motion.         General: No swelling or deformity.   Skin:     General: Skin is warm and dry.      Capillary Refill: Capillary refill takes less than 2 seconds.   Neurological:      General: No focal deficit present.      Mental Status: She is alert and oriented to person,  place, and time.      Cranial Nerves: No cranial nerve deficit.   Psychiatric:         Mood and Affect: Mood normal.         Behavior: Behavior normal.         Fluids    Intake/Output Summary (Last 24 hours) at 11/1/2020 1246  Last data filed at 10/31/2020 2200  Gross per 24 hour   Intake 580 ml   Output 500 ml   Net 80 ml       Laboratory  Recent Labs     10/29/20  2240 10/30/20  0604   WBC 7.5 7.3   RBC 4.70 4.70   HEMOGLOBIN 13.1 13.2   HEMATOCRIT 42.2 42.1   MCV 89.8 89.6   MCH 27.9 28.1   MCHC 31.0* 31.4*   RDW 46.5 44.5   PLATELETCT 184 176   MPV 11.9 11.4     Recent Labs     10/29/20  2240 10/30/20  0604   SODIUM 142 138   POTASSIUM 4.0 4.1   CHLORIDE 114* 113*   CO2 16* 11*   GLUCOSE 104* 179*   BUN 8 9   CREATININE 0.68 0.63   CALCIUM 9.0 8.8                   Imaging  No orders to display        Assessment/Plan  Optic neuritis- (present on admission)  Assessment & Plan  Recurrent, follows as an outpatient with Benja - awaiting call back to discuss tx regimen  Ran out of her CellCept - now restarted  Onhigh-dose Solu-Medrol, 1 g daily - see above, neuro recommends continue for total of 5 days - currently day 3/5  Sx improving  Continue supportive care    Diabetes mellitus type 2 in obese (HCC)- (present on admission)  Assessment & Plan  Continue home Lantus  continue insulin sliding scale  Blood sugars increased with IV steroids  following    Schizoaffective disorder, depressive type (HCC)- (present on admission)  Assessment & Plan  Continue home meds    Chest pain  Assessment & Plan  Atypical  Likely gerd  Resolving with PPI and GI cocktail  Ekg, trops and tele monitoring unremarkable  Continue to follow    Hypothyroidism- (present on admission)  Assessment & Plan  Continue home Synthroid at 75 mcg daily      GERD (gastroesophageal reflux disease)- (present on admission)  Assessment & Plan  Continue home omeprazole  See above    Morbidly obese (HCC)- (present on admission)  Assessment & Plan  Chronic,  needs diet and exercise adjustment post discharge       VTE prophylaxis: scd

## 2020-11-01 NOTE — ASSESSMENT & PLAN NOTE
Atypical  Likely gerd  Resolving with PPI and GI cocktail  Ekg, trops and tele monitoring unremarkable  following

## 2020-11-01 NOTE — PROGRESS NOTES
"Received report from day shift nurse. Assumed pt care at 1905. Pt is awake and alert, resting comfortably in bed. Pt on r.a. No signs of SOB/respiratory distress. Labs noted, VSS. Pt c/o right and left eye pain 3/10 at this moment. Pt declining pain interventions at this time. Assessment complete. Pt states tingling to LLE, states she has \"had trouble with this leg ever since her hip had to be put back into place\". +CMS, + pulse to bilateral lower extremities. Pt able to dorsi/plantar flex BLE. Suprapubic catheter visualized, draining yellow urine appropriately. Fall precautions in place. Bed at lowest position. Call light and personal belongings within reach. Bed alarm in use as safety precaution, pt encouraged to use call light for staff assistance.   "

## 2020-11-02 ENCOUNTER — APPOINTMENT (OUTPATIENT)
Dept: RADIOLOGY | Facility: MEDICAL CENTER | Age: 30
DRG: 123 | End: 2020-11-02
Attending: HOSPITALIST
Payer: MEDICARE

## 2020-11-02 LAB
GLUCOSE BLD-MCNC: 104 MG/DL (ref 65–99)
GLUCOSE BLD-MCNC: 124 MG/DL (ref 65–99)
GLUCOSE BLD-MCNC: 152 MG/DL (ref 65–99)
GLUCOSE BLD-MCNC: 183 MG/DL (ref 65–99)
TROPONIN T SERPL-MCNC: <6 NG/L (ref 6–19)

## 2020-11-02 PROCEDURE — 700102 HCHG RX REV CODE 250 W/ 637 OVERRIDE(OP): Performed by: INTERNAL MEDICINE

## 2020-11-02 PROCEDURE — 99232 SBSQ HOSP IP/OBS MODERATE 35: CPT | Performed by: HOSPITALIST

## 2020-11-02 PROCEDURE — 36415 COLL VENOUS BLD VENIPUNCTURE: CPT

## 2020-11-02 PROCEDURE — 700111 HCHG RX REV CODE 636 W/ 250 OVERRIDE (IP): Performed by: INTERNAL MEDICINE

## 2020-11-02 PROCEDURE — A9270 NON-COVERED ITEM OR SERVICE: HCPCS | Performed by: INTERNAL MEDICINE

## 2020-11-02 PROCEDURE — 700105 HCHG RX REV CODE 258: Performed by: INTERNAL MEDICINE

## 2020-11-02 PROCEDURE — 82962 GLUCOSE BLOOD TEST: CPT | Mod: 91

## 2020-11-02 PROCEDURE — 84484 ASSAY OF TROPONIN QUANT: CPT

## 2020-11-02 PROCEDURE — 770006 HCHG ROOM/CARE - MED/SURG/GYN SEMI*

## 2020-11-02 RX ADMIN — ACETAMINOPHEN 650 MG: 325 TABLET, FILM COATED ORAL at 05:41

## 2020-11-02 RX ADMIN — OXYBUTYNIN CHLORIDE 5 MG: 5 TABLET ORAL at 18:02

## 2020-11-02 RX ADMIN — ASPIRIN 81 MG: 81 TABLET, COATED ORAL at 05:41

## 2020-11-02 RX ADMIN — SODIUM BICARBONATE 650 MG TABLET 1950 MG: at 17:51

## 2020-11-02 RX ADMIN — ZIPRASIDONE HYDROCHLORIDE 40 MG: 20 CAPSULE ORAL at 05:40

## 2020-11-02 RX ADMIN — SENNOSIDES-DOCUSATE SODIUM TAB 8.6-50 MG 2 TABLET: 8.6-5 TAB at 05:41

## 2020-11-02 RX ADMIN — SODIUM CHLORIDE 1000 MG: 9 INJECTION, SOLUTION INTRAVENOUS at 17:47

## 2020-11-02 RX ADMIN — OMEPRAZOLE 20 MG: 20 CAPSULE, DELAYED RELEASE ORAL at 05:42

## 2020-11-02 RX ADMIN — FLUOXETINE 40 MG: 20 CAPSULE ORAL at 05:41

## 2020-11-02 RX ADMIN — METHOCARBAMOL TABLETS 750 MG: 500 TABLET, COATED ORAL at 17:51

## 2020-11-02 RX ADMIN — ACETAMINOPHEN 650 MG: 325 TABLET, FILM COATED ORAL at 12:11

## 2020-11-02 RX ADMIN — BUSPIRONE HYDROCHLORIDE 10 MG: 5 TABLET ORAL at 05:41

## 2020-11-02 RX ADMIN — OXYBUTYNIN CHLORIDE 5 MG: 5 TABLET ORAL at 12:09

## 2020-11-02 RX ADMIN — LEVOTHYROXINE SODIUM 75 MCG: 0.07 TABLET ORAL at 05:42

## 2020-11-02 RX ADMIN — TRAZODONE HYDROCHLORIDE 100 MG: 50 TABLET ORAL at 17:53

## 2020-11-02 RX ADMIN — ACETAZOLAMIDE 1000 MG: 250 TABLET ORAL at 17:55

## 2020-11-02 RX ADMIN — ENOXAPARIN SODIUM 40 MG: 40 INJECTION SUBCUTANEOUS at 05:43

## 2020-11-02 RX ADMIN — OXCARBAZEPINE 150 MG: 300 TABLET, FILM COATED ORAL at 18:02

## 2020-11-02 RX ADMIN — OXYBUTYNIN CHLORIDE 5 MG: 5 TABLET ORAL at 05:42

## 2020-11-02 RX ADMIN — MYCOPHENOLATE MOFETIL 1000 MG: 250 CAPSULE ORAL at 20:12

## 2020-11-02 RX ADMIN — SODIUM BICARBONATE 650 MG TABLET 1950 MG: at 05:41

## 2020-11-02 RX ADMIN — INSULIN GLARGINE 12 UNITS: 100 INJECTION, SOLUTION SUBCUTANEOUS at 18:04

## 2020-11-02 RX ADMIN — MYCOPHENOLATE MOFETIL 1000 MG: 250 CAPSULE ORAL at 08:08

## 2020-11-02 RX ADMIN — METHOCARBAMOL TABLETS 750 MG: 500 TABLET, COATED ORAL at 01:19

## 2020-11-02 RX ADMIN — TOPIRAMATE 50 MG: 25 TABLET, FILM COATED ORAL at 17:53

## 2020-11-02 RX ADMIN — GABAPENTIN 300 MG: 300 CAPSULE ORAL at 17:51

## 2020-11-02 RX ADMIN — ONDANSETRON 4 MG: 4 TABLET, ORALLY DISINTEGRATING ORAL at 10:46

## 2020-11-02 RX ADMIN — INSULIN HUMAN 1 UNITS: 100 INJECTION, SOLUTION PARENTERAL at 20:19

## 2020-11-02 RX ADMIN — INSULIN HUMAN 1 UNITS: 100 INJECTION, SOLUTION PARENTERAL at 05:52

## 2020-11-02 RX ADMIN — ACETAZOLAMIDE 1500 MG: 250 TABLET ORAL at 05:40

## 2020-11-02 RX ADMIN — METHOCARBAMOL TABLETS 750 MG: 500 TABLET, COATED ORAL at 08:08

## 2020-11-02 RX ADMIN — OXCARBAZEPINE 150 MG: 300 TABLET, FILM COATED ORAL at 05:41

## 2020-11-02 RX ADMIN — ALBUTEROL SULFATE 2 PUFF: 90 AEROSOL, METERED RESPIRATORY (INHALATION) at 15:38

## 2020-11-02 RX ADMIN — BUSPIRONE HYDROCHLORIDE 10 MG: 5 TABLET ORAL at 17:55

## 2020-11-02 RX ADMIN — ZIPRASIDONE HYDROCHLORIDE 40 MG: 20 CAPSULE ORAL at 17:53

## 2020-11-02 ASSESSMENT — ENCOUNTER SYMPTOMS
PHOTOPHOBIA: 1
PSYCHIATRIC NEGATIVE: 1
BLURRED VISION: 1
DIZZINESS: 0
VOMITING: 0
BRUISES/BLEEDS EASILY: 0
WEIGHT LOSS: 0
GASTROINTESTINAL NEGATIVE: 1
DEPRESSION: 0
FEVER: 0
MYALGIAS: 0
MUSCULOSKELETAL NEGATIVE: 1
COUGH: 0
RESPIRATORY NEGATIVE: 1
EYE PAIN: 1
NEUROLOGICAL NEGATIVE: 1
FOCAL WEAKNESS: 0
EYE REDNESS: 0
EYE DISCHARGE: 0
DOUBLE VISION: 0
HEADACHES: 0
SORE THROAT: 0
PALPITATIONS: 0
WEAKNESS: 0
NAUSEA: 0
SHORTNESS OF BREATH: 0
CONSTITUTIONAL NEGATIVE: 1
CHILLS: 0
ABDOMINAL PAIN: 0
DIAPHORESIS: 0

## 2020-11-02 ASSESSMENT — PAIN DESCRIPTION - PAIN TYPE
TYPE: ACUTE PAIN
TYPE: ACUTE PAIN

## 2020-11-02 ASSESSMENT — LIFESTYLE VARIABLES: SUBSTANCE_ABUSE: 0

## 2020-11-02 NOTE — PROGRESS NOTES
Pt resting in bed at this time. VSS. Pt c/o 4/10 pain in eyes. Declines interventions. No n/v. Catheter insertion site is clean, dry, and intact. Pt is requesting to rest. Fall precautions in place.

## 2020-11-02 NOTE — CARE PLAN
Problem: Communication  Goal: The ability to communicate needs accurately and effectively will improve  Outcome: PROGRESSING AS EXPECTED     Problem: Safety  Goal: Will remain free from falls  Outcome: PROGRESSING AS EXPECTED     Problem: Infection  Goal: Will remain free from infection  Outcome: PROGRESSING AS EXPECTED     Problem: Venous Thromboembolism (VTW)/Deep Vein Thrombosis (DVT) Prevention:  Goal: Patient will participate in Venous Thrombosis (VTE)/Deep Vein Thrombosis (DVT)Prevention Measures  Outcome: PROGRESSING AS EXPECTED     Problem: Knowledge Deficit  Goal: Knowledge of disease process/condition, treatment plan, diagnostic tests, and medications will improve  Outcome: PROGRESSING AS EXPECTED     Problem: Pain Management  Goal: Pain level will decrease to patient's comfort goal  Outcome: PROGRESSING AS EXPECTED

## 2020-11-02 NOTE — PROGRESS NOTES
Report received from SONYA Tineo. Patient denies needs at this time. Safety check completed. Will continue to monitor patient.

## 2020-11-02 NOTE — CARE PLAN
Problem: Safety  Goal: Will remain free from falls  Description: Patient calls before getting out of bed to go to the bathroom  Outcome: PROGRESSING AS EXPECTED     Problem: Venous Thromboembolism (VTW)/Deep Vein Thrombosis (DVT) Prevention:  Goal: Patient will participate in Venous Thrombosis (VTE)/Deep Vein Thrombosis (DVT)Prevention Measures  Description: Patient ambulatory to bathroom  Outcome: PROGRESSING AS EXPECTED     Problem: Pain Management  Goal: Pain level will decrease to patient's comfort goal  Description: Tylenol given to help with eye pain (8/10)  Outcome: PROGRESSING AS EXPECTED

## 2020-11-02 NOTE — DISCHARGE PLANNING
"Hospital Care Management Discharge Planning        Anticipated Discharge Disposition:   · Home     Action:   · RN CM anticipates no DC needs at this time.      Barriers to Discharge:   · Medical clearance      Plan:    · Hospital Care Management Team to continue to provide support services and assistance with discharge planning as needed.         Current Expected Day of Discharge: 11/3           For further assistance please contact the assigned RN Case Manager or  at the extension listed under \"Treatment Teams\".     "

## 2020-11-02 NOTE — PROGRESS NOTES
"Hospital Medicine Daily Progress Note    Date of Service  11/2/2020    Chief Complaint  30 y.o. female admitted 10/29/2020 with eye pain    Hospital Course    Patient is a 30 year old woman with history of right sided optic neuritis, sleep apnema, depression, multiple personality disorder and psychosis among other chronic issues who has been followed by Dr. Yousif of neuro ophthalmology.  She reportedly ran out of her chronic cellcept last week and then presented to the ER with acute right eye pain and dizziness, consistent with previous symptoms of her optic neuritis.  She was started on high dose iv steroids and oral cellcept was also restarted.      Interval Problem Update  She denies further chest pain, reports that her vision continues to slowly improve, the pain and blurriness in the left eye also improving, denies r sided sx,  she reports that she had mild \"tingling\" on her left side earlier and now it has resolved, no weakness - she declines any further imaging - she reports she has an appointment with Dr. Yousif on the 4th - still no call back from him  She remains axox3 and is opening her eyes regularly now  Ros otherwise negative  Discussed with nursing and case management    Consultants/Specialty  Still awaiting call back from neuro ophthalmology  Neurology    Code Status  Full Code    Disposition  Tbd    Review of Systems  Review of Systems   Constitutional: Negative.  Negative for chills, diaphoresis, fever, malaise/fatigue and weight loss.   HENT: Negative.  Negative for sore throat.    Eyes: Positive for blurred vision, photophobia and pain. Negative for double vision, discharge and redness.   Respiratory: Negative.  Negative for cough and shortness of breath.    Cardiovascular: Negative for chest pain, palpitations and leg swelling.   Gastrointestinal: Negative.  Negative for abdominal pain, nausea and vomiting.   Genitourinary: Negative.  Negative for dysuria.   Musculoskeletal: Negative.  " Negative for myalgias.   Skin: Negative.  Negative for itching and rash.   Neurological: Negative.  Negative for dizziness, focal weakness, weakness and headaches.   Endo/Heme/Allergies: Negative.  Does not bruise/bleed easily.   Psychiatric/Behavioral: Negative.  Negative for depression, substance abuse and suicidal ideas.   All other systems reviewed and are negative.       Physical Exam  Temp:  [36.4 °C (97.6 °F)-36.8 °C (98.3 °F)] 36.5 °C (97.7 °F)  Pulse:  [55-57] 55  Resp:  [17-18] 18  BP: (106-123)/(53-72) 106/57  SpO2:  [90 %-95 %] 94 %    Physical Exam  Vitals signs and nursing note reviewed. Exam conducted with a chaperone present.   Constitutional:       General: She is not in acute distress.     Appearance: Normal appearance. She is not diaphoretic.   HENT:      Head: Normocephalic.      Nose: Nose normal.      Mouth/Throat:      Mouth: Mucous membranes are moist.   Eyes:      General:         Right eye: No discharge.         Left eye: No discharge.      Extraocular Movements: Extraocular movements intact.      Conjunctiva/sclera: Conjunctivae normal.      Pupils: Pupils are equal, round, and reactive to light.   Cardiovascular:      Rate and Rhythm: Normal rate and regular rhythm.      Pulses: Normal pulses.      Heart sounds: Normal heart sounds.   Pulmonary:      Effort: Pulmonary effort is normal.      Breath sounds: Normal breath sounds.   Abdominal:      General: Abdomen is flat. Bowel sounds are normal.      Palpations: Abdomen is soft.   Musculoskeletal: Normal range of motion.         General: No swelling or deformity.   Skin:     General: Skin is warm and dry.      Capillary Refill: Capillary refill takes less than 2 seconds.   Neurological:      General: No focal deficit present.      Mental Status: She is alert and oriented to person, place, and time.      Cranial Nerves: No cranial nerve deficit.   Psychiatric:         Mood and Affect: Mood normal.         Behavior: Behavior normal.          Fluids    Intake/Output Summary (Last 24 hours) at 11/2/2020 1395  Last data filed at 11/2/2020 0957  Gross per 24 hour   Intake 720 ml   Output 750 ml   Net -30 ml       Laboratory                        Imaging  IR-US GUIDED PIV    (Results Pending)        Assessment/Plan  Optic neuritis- (present on admission)  Assessment & Plan  Recurrent, follows as an outpatient with Benja - awaiting call back to discuss tx regimen - did discuss with Joy who recommended complete the 5 days of the high dose steroids and then follow up with [facility]ja as an outpatinet  Ran out of her CellCept - now restarted  Onhigh-dose Solu-Medrol, 1 g daily - see above, neuro recommends continue for total of 5 days - currently day 4/5  Symptoms continue to improve  Continue supportive care    Diabetes mellitus type 2 in obese (HCC)- (present on admission)  Assessment & Plan  Continue home Lantus  continue insulin sliding scale  Blood sugars increased with IV steroids  Continue to follow    Schizoaffective disorder, depressive type (HCC)- (present on admission)  Assessment & Plan  Continue home meds    Chest pain  Assessment & Plan  Atypical  Likely gerd  Resolving with PPI and GI cocktail  Ekg, trops and tele monitoring unremarkable  following    Hypothyroidism- (present on admission)  Assessment & Plan  Continue home Synthroid at 75 mcg daily      GERD (gastroesophageal reflux disease)- (present on admission)  Assessment & Plan  Continue home omeprazole  See above    Morbidly obese (HCC)- (present on admission)  Assessment & Plan  Chronic, needs diet and exercise adjustment post discharge       VTE prophylaxis: scd

## 2020-11-03 VITALS
HEART RATE: 47 BPM | TEMPERATURE: 97.5 F | SYSTOLIC BLOOD PRESSURE: 113 MMHG | DIASTOLIC BLOOD PRESSURE: 57 MMHG | RESPIRATION RATE: 17 BRPM | OXYGEN SATURATION: 96 % | HEIGHT: 65 IN | WEIGHT: 254.63 LBS | BODY MASS INDEX: 42.42 KG/M2

## 2020-11-03 PROBLEM — R07.9 CHEST PAIN: Status: RESOLVED | Noted: 2017-03-30 | Resolved: 2020-11-03

## 2020-11-03 LAB
GLUCOSE BLD-MCNC: 119 MG/DL (ref 65–99)
GLUCOSE BLD-MCNC: 174 MG/DL (ref 65–99)
TROPONIN T SERPL-MCNC: <6 NG/L (ref 6–19)

## 2020-11-03 PROCEDURE — 99239 HOSP IP/OBS DSCHRG MGMT >30: CPT | Performed by: STUDENT IN AN ORGANIZED HEALTH CARE EDUCATION/TRAINING PROGRAM

## 2020-11-03 PROCEDURE — 82962 GLUCOSE BLOOD TEST: CPT

## 2020-11-03 PROCEDURE — A9270 NON-COVERED ITEM OR SERVICE: HCPCS | Performed by: INTERNAL MEDICINE

## 2020-11-03 PROCEDURE — 700105 HCHG RX REV CODE 258: Performed by: STUDENT IN AN ORGANIZED HEALTH CARE EDUCATION/TRAINING PROGRAM

## 2020-11-03 PROCEDURE — 700111 HCHG RX REV CODE 636 W/ 250 OVERRIDE (IP): Performed by: STUDENT IN AN ORGANIZED HEALTH CARE EDUCATION/TRAINING PROGRAM

## 2020-11-03 PROCEDURE — 84484 ASSAY OF TROPONIN QUANT: CPT

## 2020-11-03 PROCEDURE — 700102 HCHG RX REV CODE 250 W/ 637 OVERRIDE(OP): Performed by: INTERNAL MEDICINE

## 2020-11-03 PROCEDURE — 700111 HCHG RX REV CODE 636 W/ 250 OVERRIDE (IP): Performed by: INTERNAL MEDICINE

## 2020-11-03 PROCEDURE — 36415 COLL VENOUS BLD VENIPUNCTURE: CPT

## 2020-11-03 RX ORDER — PREDNISONE 20 MG/1
60 TABLET ORAL DAILY
Qty: 9 TAB | Refills: 0 | Status: SHIPPED | OUTPATIENT
Start: 2020-11-04 | End: 2020-11-07

## 2020-11-03 RX ORDER — MYCOPHENOLATE MOFETIL 500 MG/1
1000 TABLET ORAL 2 TIMES DAILY
Qty: 60 TAB | Refills: 2 | Status: SHIPPED | OUTPATIENT
Start: 2020-11-03 | End: 2021-02-11 | Stop reason: SDUPTHER

## 2020-11-03 RX ORDER — PREDNISONE 20 MG/1
40 TABLET ORAL DAILY
Qty: 4 TAB | Refills: 0 | Status: SHIPPED | OUTPATIENT
Start: 2020-11-08 | End: 2020-11-10

## 2020-11-03 RX ADMIN — OXYBUTYNIN CHLORIDE 5 MG: 5 TABLET ORAL at 11:38

## 2020-11-03 RX ADMIN — MYCOPHENOLATE MOFETIL 1000 MG: 250 CAPSULE ORAL at 08:12

## 2020-11-03 RX ADMIN — ACETAMINOPHEN 650 MG: 325 TABLET, FILM COATED ORAL at 08:12

## 2020-11-03 RX ADMIN — SENNOSIDES-DOCUSATE SODIUM TAB 8.6-50 MG 2 TABLET: 8.6-5 TAB at 05:45

## 2020-11-03 RX ADMIN — SODIUM CHLORIDE 1000 MG: 9 INJECTION, SOLUTION INTRAVENOUS at 09:40

## 2020-11-03 RX ADMIN — ASPIRIN 81 MG: 81 TABLET, COATED ORAL at 05:44

## 2020-11-03 RX ADMIN — FLUOXETINE 40 MG: 20 CAPSULE ORAL at 05:46

## 2020-11-03 RX ADMIN — LEVOTHYROXINE SODIUM 75 MCG: 0.07 TABLET ORAL at 05:47

## 2020-11-03 RX ADMIN — ONDANSETRON HYDROCHLORIDE 4 MG: 2 SOLUTION INTRAMUSCULAR; INTRAVENOUS at 05:44

## 2020-11-03 RX ADMIN — OXCARBAZEPINE 150 MG: 300 TABLET, FILM COATED ORAL at 05:44

## 2020-11-03 RX ADMIN — ONDANSETRON HYDROCHLORIDE 4 MG: 2 SOLUTION INTRAMUSCULAR; INTRAVENOUS at 00:13

## 2020-11-03 RX ADMIN — ENOXAPARIN SODIUM 40 MG: 40 INJECTION SUBCUTANEOUS at 05:44

## 2020-11-03 RX ADMIN — ACETAZOLAMIDE 1500 MG: 250 TABLET ORAL at 05:45

## 2020-11-03 RX ADMIN — SODIUM BICARBONATE 650 MG TABLET 1950 MG: at 05:47

## 2020-11-03 RX ADMIN — ACETAMINOPHEN 650 MG: 325 TABLET, FILM COATED ORAL at 00:12

## 2020-11-03 RX ADMIN — METHOCARBAMOL TABLETS 750 MG: 500 TABLET, COATED ORAL at 02:41

## 2020-11-03 RX ADMIN — ZIPRASIDONE HYDROCHLORIDE 40 MG: 20 CAPSULE ORAL at 05:47

## 2020-11-03 RX ADMIN — OMEPRAZOLE 20 MG: 20 CAPSULE, DELAYED RELEASE ORAL at 05:45

## 2020-11-03 RX ADMIN — OXYBUTYNIN CHLORIDE 5 MG: 5 TABLET ORAL at 05:46

## 2020-11-03 RX ADMIN — BUSPIRONE HYDROCHLORIDE 10 MG: 5 TABLET ORAL at 05:44

## 2020-11-03 ASSESSMENT — PAIN DESCRIPTION - PAIN TYPE: TYPE: ACUTE PAIN

## 2020-11-03 NOTE — CARE PLAN
Problem: Safety  Goal: Will remain free from falls  Description: Patient calls before getting out of bed to go to the bathroom  Outcome: PROGRESSING AS EXPECTED     Problem: Knowledge Deficit  Goal: Knowledge of disease process/condition, treatment plan, diagnostic tests, and medications will improve  Outcome: PROGRESSING AS EXPECTED     Problem: Skin Integrity  Goal: Risk for impaired skin integrity will decrease  Outcome: PROGRESSING AS EXPECTED

## 2020-11-03 NOTE — CARE PLAN
Problem: Communication  Goal: The ability to communicate needs accurately and effectively will improve  Outcome: PROGRESSING AS EXPECTED     Problem: Safety  Goal: Will remain free from falls  Description: Patient calls before getting out of bed to go to the bathroom  Outcome: PROGRESSING AS EXPECTED     Problem: Infection  Goal: Will remain free from infection  Outcome: PROGRESSING AS EXPECTED

## 2020-11-03 NOTE — PROGRESS NOTES
Pt started c/o L sided facial drooping and numbness on the L side.  Rapid response called. VSS. NIH stroke scale completed. MD assessed pt and determined no new orders were needed at this time. Pt positioned for comfort. Fall precautions in place.

## 2020-11-03 NOTE — CODE DOCUMENTATION
0145: [facility] 11, pt reporting loss of sensation and movement to LUE, diminished sensation to LLE. While assessing left arm sensation by pressing pencil on to nail bed, pt would blink eyes every time pencil was pressed, states she has no feel in arm however.

## 2020-11-03 NOTE — PROGRESS NOTES
Ptsa discharge instruction given to pt. Pts aware about new medication ordered prednisone and she is aware that she needs to get this med from Pharmacy. Pts 2 iv saline lock discontinued intact. Primary RN aware.Pt awaiting for transport.

## 2020-11-03 NOTE — PROGRESS NOTES
Pt sitting up in bed. VSS. Pt c/o 3/10 pain in eyes that increases when exposed to bright light. No n/v. Pt states she would like to rest at this time. Positioned pt for comfort. Fall precautions in place.    0012: pt c/o headache and n/v. Medicated per MAR.

## 2020-11-03 NOTE — DISCHARGE SUMMARY
Discharge Summary    CHIEF COMPLAINT ON ADMISSION  Chief Complaint   Patient presents with   • Eye Pain       Reason for Admission  right eye pain    Admission Date  10/29/2020    CODE STATUS  Full Code    HPI & HOSPITAL COURSE  Patient is a 30 year old woman with history of right sided optic neuritis on home cellcept, prednisone, mycophenolate  Who has been followed by Dr. Yousif Neuro ophthalmology, DM on insulin, SVT on ivabradine follow-up with [facility], sleep apnea, depression, multiple personality disorder and psychosis was admitted on 10/29 with right eye pain.  She reportedly ran out of her chronic cellcept last week and then presented to the ER with acute right eye pain and dizziness, consistent with previous symptoms of her optic neuritis.  She was started on high dose IV steroids and oral cellcept was also restarted.  Patient has some episodes of chest pain during the hospital stay & EKGs showed no acute changes.  Patient's visions slowly improved after restarting the Cellcept.  Discussed with neurology Dr Hamilton who recommended to complete  5 days of the high dose steroids and then follow up with [facility]ja as an outpatient. Dr Yousif was out at [facility] this week & the patient has an appointment with Dr. Yousif on November 12.  [facility]  was called to make follow-up PCP appointment in next week.  Patient also has appointment with cardiology on November 12.   Patient was discharged home completion of after  5 days of high-dose IV steroid, followed by steroid taper dose and also home medication CellCept was refilled.     Therefore, she is discharged in fair and stable condition to home with close outpatient follow-up.    The patient met 2-midnight criteria for an inpatient stay at the time of discharge.    Discharge Date  11/3/2020    FOLLOW UP ITEMS POST DISCHARGE  Follow up with PCP in 1 to 2 weeks after discharge  Follow up with Dr. Yousif and cardiology on November  12      DISCHARGE DIAGNOSES  Active Problems:    Optic neuritis POA: Yes    Diabetes mellitus type 2 in obese (HCC) POA: Yes    Schizoaffective disorder, depressive type (HCC) POA: Yes    Morbidly obese (HCC) POA: Yes      Overview: IMO load March 2020    GERD (gastroesophageal reflux disease) (Chronic) POA: Yes    Hypothyroidism (Chronic) POA: Yes  Resolved Problems:    Chest pain POA: Unknown      FOLLOW UP  Future Appointments   Date Time Provider Department Center   11/12/2020 10:40 AM John Leon M.D. [facility] None     No follow-up provider specified.    MEDICATIONS ON DISCHARGE     Medication List      CHANGE how you take these medications      Instructions   calcium carbonate 750 MG chewable tablet  What changed:   · when to take this  · reasons to take this  Commonly known as: TUMS EX   Take 2 Tabs by mouth every day.  Dose: 1,500 mg     insulin glargine 100 UNIT/ML Soln  What changed: how much to take  Commonly known as: Lantus   Inject 8 Units as instructed every evening.  Dose: 8 Units     * predniSONE 10 MG Tabs  What changed: Another medication with the same name was added. Make sure you understand how and when to take each.  Commonly known as: DELTASONE   Take 10 mg by mouth every day. Maintenance Medication.  Dose: 10 mg     * predniSONE 20 MG Tabs  Start taking on: November 4, 2020  What changed: You were already taking a medication with the same name, and this prescription was added. Make sure you understand how and when to take each.  Commonly known as: DELTASONE   Take 3 Tabs by mouth every day for 3 days. Prednisone 60 mg daily x 3 days followed by  Prednisone 40 mg daily for 2 days followed by  Prednisone 20 mg daily for 2 days and continue taking home prednisone 10 mg daily.  Dose: 60 mg     * predniSONE 20 MG Tabs  Start taking on: November 8, 2020  What changed: You were already taking a medication with the same name, and this prescription was added. Make sure you understand how and when  to take each.  Commonly known as: DELTASONE   Take 2 Tabs by mouth every day for 2 days. Prednisone 60 mg daily x 3 days followed by  Prednisone 40 mg daily for 2 days followed by  Prednisone 20 mg daily for 2 days and continue taking home prednisone 10 mg daily.  Dose: 40 mg     traZODone 100 MG Tabs  What changed: when to take this  Commonly known as: DESYREL   Take 1 Tab by mouth every bedtime.  Dose: 100 mg         * This list has 3 medication(s) that are the same as other medications prescribed for you. Read the directions carefully, and ask your doctor or other care provider to review them with you.            CONTINUE taking these medications      Instructions   acetaZOLAMIDE  MG Cp12  Commonly known as: DIAMOX   Take 1,000-1,500 mg by mouth 2 times a day. 1500mg in AM  1000mg at PM  Dose: 1,000-1,500 mg     albuterol 108 (90 Base) MCG/ACT Aers inhalation aerosol   Inhale 2 Puffs by mouth every 6 hours as needed for Shortness of Breath.  Dose: 2 Puff     aspirin EC 81 MG Tbec  Commonly known as: ECOTRIN   Take 81 mg by mouth every day.  Dose: 81 mg     busPIRone 10 MG Tabs tablet  Commonly known as: BUSPAR   Take 10 mg by mouth 2 times a day.  Dose: 10 mg     Corlanor 7.5 MG Tabs tablet  Generic drug: ivabradine   Take 7.5 mg by mouth 2 times a day, with meals.  Dose: 7.5 mg     fluoxetine 40 MG capsule  Commonly known as: PROZAC   Take 1 Cap by mouth every day.  Dose: 40 mg     gabapentin 300 MG Caps  Commonly known as: NEURONTIN   Take 300 mg by mouth every evening.  Dose: 300 mg     levothyroxine 75 MCG Tabs  Commonly known as: SYNTHROID   Take 1 Tab by mouth Every morning on an empty stomach.  Dose: 75 mcg     lidocaine 2 % Soln  Commonly known as: XYLOCAINE   Take 5 mL by mouth as needed for Throat/Mouth Pain (q6hr PRN throat pain, ok to rinse and spit or swallow).  Dose: 5 mL     Melatonin 10 MG Tabs   Take 10 mg by mouth every evening.  Dose: 10 mg     methocarbamol 750 MG Tabs  Commonly known  "as: ROBAXIN   Take 750 mg by mouth as needed. Indications: Musculoskeletal Pain  Dose: 750 mg     mycophenolate 500 MG tablet  Commonly known as: CellCept   Take 2 Tabs by mouth 2 times a day.  Dose: 1,000 mg     Myrbetriq 50 MG Tb24  Generic drug: Mirabegron ER   Take 50 mg by mouth every day.  Dose: 50 mg     omeprazole 20 MG delayed-release capsule  Commonly known as: PRILOSEC   Take 1 Cap by mouth every day.  Dose: 20 mg     ondansetron 4 MG Tbdp  Commonly known as: ZOFRAN ODT   Take 4 mg by mouth every 6 hours as needed for Nausea.  Dose: 4 mg     OXcarbazepine 150 MG Tabs  Commonly known as: TRILEPTAL   TAKE ONE TABLET BY MOUTH TWICE DAILY     oxybutynin 5 MG Tabs  Commonly known as: DITROPAN   Take 1 Tab by mouth 3 times a day.  Dose: 5 mg     potassium chloride SA 20 MEQ Tbcr  Commonly known as: Kdur   Take 40 mEq by mouth 2 times a day.  Dose: 40 mEq     sodium bicarbonate 650 MG Tabs  Commonly known as: SODIUM BICARBONATE   Take 1,950 mg by mouth 2 times a day.  Dose: 1,950 mg     topiramate 25 MG Tabs  Commonly known as: TOPAMAX   Take 50 mg by mouth every evening.  Dose: 50 mg     Trulicity 1.5 MG/0.5ML Sopn  Generic drug: Dulaglutide   Inject 0.5 mL as instructed every Friday.  Dose: 0.5 mL     vitamin D (Ergocalciferol) 1.25 MG (57535 UT) Caps capsule  Commonly known as: DRISDOL   Take 50,000 Units by mouth every 7 days. On Wed  Dose: 50,000 Units     ziprasidone 40 MG Caps  Commonly known as: GEODON   Take 1 Cap by mouth 2 Times a Day.  Dose: 40 mg            Allergies  Allergies   Allergen Reactions   • Depakote [Divalproex Sodium] Unspecified     Muscle spasms/muscle pain and weakness     • Amitriptyline Unspecified     Headaches     • Aripiprazole [Abilify] Unspecified     Headaches/muscle twitching     • Clindamycin Nausea     Even with food     • Flagyl [Metronidazole Hcl] Unspecified     \"eye problems\"     • Flomax [Tamsulosin Hydrochloride] Swelling   • Levaquin Unspecified     Severe muscle " cramps in legs  RXN=unknown   • Metformin Unspecified     Increased lactic acid      • Tape Rash     Tears skin off  coban with Tegaderm tape ok intermittently  RXN=ongoing   • Vancomycin Itching     Pt becomes flushed in face and chest.   RXN=7/10/16   • Wound Dressing Adhesive Hives     By pt report   • Ciprofloxacin    • Hydromorphone Hcl      Pt states she feels loopy   • Keflex Rash     Pt states she gets a rash with this medication  Tolerates ceftriaxone   • Levofloxacin Unspecified     Leg muscle cramps   • Metronidazole Rash     .   • Penicillins Hives   • Tamsulosin Swelling   • Valproic Acid Rash     .       DIET  Orders Placed This Encounter   Procedures   • Diet Order Diabetic     Standing Status:   Standing     Number of Occurrences:   1     Order Specific Question:   Diet:     Answer:   Diabetic [3]       ACTIVITY  As tolerated.  Weight bearing as tolerated    CONSULTATIONS  Neurology    PROCEDURES  N/A     LABORATORY  Lab Results   Component Value Date    SODIUM 138 10/30/2020    POTASSIUM 4.1 10/30/2020    CHLORIDE 113 (H) 10/30/2020    CO2 11 (L) 10/30/2020    GLUCOSE 179 (H) 10/30/2020    BUN 9 10/30/2020    CREATININE 0.63 10/30/2020    CREATININE 0.75 (L) 07/20/2010    GLOMRATE >59 07/20/2010        Lab Results   Component Value Date    WBC 7.3 10/30/2020    WBC 6.1 07/20/2010    HEMOGLOBIN 13.2 10/30/2020    HEMATOCRIT 42.1 10/30/2020    PLATELETCT 176 10/30/2020        Total time of the discharge process exceeds 54 minutes.

## 2020-11-03 NOTE — DISCHARGE INSTRUCTIONS
Discharge Instructions    Discharged to home by car with friend. Discharged via wheelchair, hospital escort: Yes.  Special equipment needed: Not Applicable    Be sure to schedule a follow-up appointment with your primary care doctor or any specialists as instructed.     Discharge Plan:   Influenza Vaccine Indication: Patient Refuses    I understand that a diet low in cholesterol, fat, and sodium is recommended for good health. Unless I have been given specific instructions below for another diet, I accept this instruction as my diet prescription.   Other diet:Regular diet        Special Instructions: None    · Is patient discharged on Warfarin / Coumadin?   No       Depression / Suicide Risk    As you are discharged from this [facility] facility, it is important to learn how to keep safe from harming yourself.    Recognize the warning signs:  · Abrupt changes in personality, positive or negative- including increase in energy   · Giving away possessions  · Change in eating patterns- significant weight changes-  positive or negative  · Change in sleeping patterns- unable to sleep or sleeping all the time   · Unwillingness or inability to communicate  · Depression  · Unusual sadness, discouragement and loneliness  · Talk of wanting to die  · Neglect of personal appearance   · Rebelliousness- reckless behavior  · Withdrawal from people/activities they love  · Confusion- inability to concentrate     If you or a loved one observes any of these behaviors or has concerns about self-harm, here's what you can do:  · Talk about it- your feelings and reasons for harming yourself  · Remove any means that you might use to hurt yourself (examples: pills, rope, extension cords, firearm)  · Get professional help from the community (Mental Health, Substance Abuse, psychological counseling)  · Do not be alone:Call your Safe Contact- someone whom you trust who will be there for you.  · Call your local CRISIS HOTLINE 612-2124 or  957-457-7922  · Call your local Children's Mobile Crisis Response Team [place] (055) 521-0470 or www.[employer]  · Call the toll free National Suicide Prevention Hotlines   · National Suicide Prevention Lifeline 221-566-TPPH (9102)  · National [employer] Network 800-SUICIDE (459-3317)

## 2020-11-03 NOTE — PROGRESS NOTES
Received report from night shift RN at 0715. Pt is awake and alert. No signs of distress. Pt denies any sob, nausea, numbness or tingling. Plan of are reviewed. Fall precautions in place. Hourly rounding in place.

## 2020-11-06 ENCOUNTER — HOSPITAL ENCOUNTER (EMERGENCY)
Facility: MEDICAL CENTER | Age: 30
End: 2020-11-06
Attending: EMERGENCY MEDICINE
Payer: MEDICARE

## 2020-11-06 VITALS
OXYGEN SATURATION: 96 % | HEART RATE: 76 BPM | DIASTOLIC BLOOD PRESSURE: 101 MMHG | HEIGHT: 65 IN | WEIGHT: 254.63 LBS | RESPIRATION RATE: 16 BRPM | SYSTOLIC BLOOD PRESSURE: 140 MMHG | TEMPERATURE: 97.3 F | BODY MASS INDEX: 42.42 KG/M2

## 2020-11-06 DIAGNOSIS — T83.010A SUPRAPUBIC CATHETER DYSFUNCTION, INITIAL ENCOUNTER (HCC): ICD-10-CM

## 2020-11-06 PROCEDURE — 99282 EMERGENCY DEPT VISIT SF MDM: CPT

## 2020-11-06 PROCEDURE — 51705 CHANGE OF BLADDER TUBE: CPT

## 2020-11-06 ASSESSMENT — FIBROSIS 4 INDEX: FIB4 SCORE: 0.56

## 2020-11-07 NOTE — ED NOTES
suprapubic catheter changed per ERP  Discharge summary completed with patient. Patient education completed regarding follow up care. Patient wheeled out to [employer] where her ride was waiting to take her home. All belongings sent home with patient.

## 2020-11-07 NOTE — ED TRIAGE NOTES
"Pt to triage, states \" her suprapubic catheter fell out around 3 pm\", here to have it replaced.   "

## 2020-11-07 NOTE — ED PROVIDER NOTES
"ED Provider Note    Scribed for Phuong Calderon M.D. by Taiwo Zuñiga. 11/6/2020  8:17 PM    Means of arrival: Walk-in  History obtained from: Patient  History limited by: None      CHIEF COMPLAINT  Chief Complaint   Patient presents with   • Urinary Catheter Problem     suprapubic fell out        HPI  Kristin Balderrama is a 30 y.o. female who presents to the Emergency Department for evaluation of a urinary catheter problem. She has a suprapubic catheter for incontinence and chronic UTI and reports that this her 6th catheter this year. Today,she reports the balloon broke open and fell out around 3 pm. A similar episode occurred last week as well. She reports that she has recently been falling and was hospitalized last week for optic neuritis. She has noticed bleeding from the site of her catheter and describes a \"corkscrew\" of skin that came out. Dr. Rosenbaum is her urologist. She has not had any fevers, vomiting, or significant abdominal pain.    REVIEW OF SYSTEMS  Pertinent positive include catheter problem, bleeding. Pertinent negative include no fever or vomiting. All other systems reviewed and are negative.    PAST MEDICAL HISTORY   has a past medical history of Abdominal pain, Anginal syndrome, Apnea, sleep, Arrhythmia, Arthritis, ASTHMA, Atrial fibrillation ([facility]), Back pain, Borderline personality disorder ([facility]), Breath shortness, Bronchitis, Cardiac arrhythmia, Chickenpox, Chronic UTI (9/18/2010), Cough, Daytime sleepiness, Depression, Diabetes (HCC), Diarrhea, Disorder of thyroid, Fall, Fatigue, Frequent headaches, Gasping for breath, Gynecological disorder, Headache(784.0), Heart burn, History of falling, Hypertension, Indigestion, Migraine, 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    SOCIAL HISTORY  Social History     Tobacco Use   • Smoking status: Never Smoker   • Smokeless tobacco: Never Used   Substance and Sexual Activity   • Alcohol use: No     Alcohol/week: 0.0 oz     Frequency: Never     Binge frequency: Never   • Drug use: Not Currently     Frequency: 7.0 times per week     Types: Marijuana   • Sexual activity: Not Currently     Birth control/protection: Pill       SURGICAL HISTORY   has a past surgical history that includes neuro dest facet l/s w/ig sngl (4/21/2015); recovery (1/27/2016); delmar by laparoscopy (8/29/2010); lumbar fusion anterior (8/21/2012); other cardiac surgery (1/2016); tonsillectomy; bowel stimulator insertion (7/13/2016); gastroscopy with balloon dilatation (N/A, 1/4/2017); muscle biopsy (Right, 1/26/2017); other abdominal surgery; and laminotomy.    CURRENT MEDICATIONS  Current Outpatient Medications   Medication Instructions   • acetaZOLAMIDE SR (DIAMOX) 1,000-1,500 mg, Oral, 2 TIMES DAILY, 1500mg in AM<BR>1000mg at PM   • albuterol 108 (90 Base) MCG/ACT Aero Soln inhalation aerosol 2 Puffs, Inhalation, EVERY 6 HOURS PRN   • aspirin EC (ECOTRIN) 81 mg, Oral, DAILY   • busPIRone (BUSPAR) 10 mg, Oral, 2 TIMES DAILY   • calcium carbonate (TUMS EX) 1,500 mg, Oral, DAILY   • Corlanor 7.5 mg, Oral, 2 TIMES DAILY WITH MEALS   • Dulaglutide (TRULICITY) 1.5 MG/0.5ML Solution Pen-injector 0.5 mL, Subcutaneous, FRIDAYS   • fluoxetine (PROZAC) 40 mg, Oral, DAILY   • gabapentin (NEURONTIN) 300 mg, Oral, EVERY EVENING   • insulin glargine (LANTUS) 8 Units, Subcutaneous, EVERY EVENING   • levothyroxine (SYNTHROID) 75 mcg, Oral, EACH MORNING ON EMPTY STOMACH   • lidocaine (XYLOCAINE) 2 % Solution 5 mL, Oral, PRN   • Melatonin 10 mg, Oral, EVERY EVENING   • methocarbamol (ROBAXIN) 750  "mg, Oral, PRN   • mycophenolate (CELLCEPT) 1,000 mg, Oral, 2 TIMES DAILY   • Myrbetriq 50 mg, Oral, DAILY   • omeprazole (PRILOSEC) 20 mg, Oral, DAILY   • ondansetron (ZOFRAN ODT) 4 mg, Oral, EVERY 6 HOURS PRN   • OXcarbazepine (TRILEPTAL) 150 MG Tab TAKE ONE TABLET BY MOUTH TWICE DAILY   • oxybutynin (DITROPAN) 5 mg, Oral, 3 TIMES DAILY   • potassium chloride SA (KDUR) 20 MEQ Tab CR 40 mEq, Oral, 2 TIMES DAILY   • predniSONE (DELTASONE) 10 mg, Oral, DAILY, Maintenance Medication.   • predniSONE (DELTASONE) 60 mg, Oral, DAILY, Prednisone 60 mg daily x 3 days followed by<BR>Prednisone 40 mg daily for 2 days followed by<BR>Prednisone 20 mg daily for 2 days and continue taking home prednisone 10 mg daily.   • [START ON 11/8/2020] predniSONE (DELTASONE) 40 mg, Oral, DAILY, Prednisone 60 mg daily x 3 days followed by<BR>Prednisone 40 mg daily for 2 days followed by<BR>Prednisone 20 mg daily for 2 days and continue taking home prednisone 10 mg daily.   • sodium bicarbonate (SODIUM BICARBONATE) 1,950 mg, Oral, 2 TIMES DAILY   • topiramate (TOPAMAX) 50 mg, Oral, EVERY EVENING   • traZODone (DESYREL) 100 mg, Oral, EVERY BEDTIME   • vitamin D (Ergocalciferol) (DRISDOL) 50,000 Units, Oral, EVERY 7 DAYS, On Wed   • ziprasidone (GEODON) 40 mg, Oral, 2 TIMES DAILY       ALLERGIES  Allergies   Allergen Reactions   • Depakote [Divalproex Sodium] Unspecified     Muscle spasms/muscle pain and weakness     • Amitriptyline Unspecified     Headaches     • Aripiprazole [Abilify] Unspecified     Headaches/muscle twitching     • Clindamycin Nausea     Even with food     • Flagyl [Metronidazole Hcl] Unspecified     \"eye problems\"     • Flomax [Tamsulosin Hydrochloride] Swelling   • Levaquin Unspecified     Severe muscle cramps in legs  RXN=unknown   • Metformin Unspecified     Increased lactic acid      • Tape Rash     Tears skin off  coban with Tegaderm tape ok intermittently  RXN=ongoing   • Vancomycin Itching     Pt becomes flushed in " "face and chest.   RXN=7/10/16   • Wound Dressing Adhesive Hives     By pt report   • Ciprofloxacin    • Hydromorphone Hcl      Pt states she feels loopy   • Keflex Rash     Pt states she gets a rash with this medication  Tolerates ceftriaxone   • Levofloxacin Unspecified     Leg muscle cramps   • Metronidazole Rash     .   • Penicillins Hives   • Tamsulosin Swelling   • Valproic Acid Rash     .       PHYSICAL EXAM   VITAL SIGNS: /80   Pulse 73   Temp 36.2 °C (97.2 °F) (Temporal)   Resp 16   Ht 1.651 m (5' 5\")   Wt 115.5 kg (254 lb 10.1 oz)   SpO2 96%   BMI 42.37 kg/m²    Constitutional: Obese nontoxic female, Alert in no apparent distress.  HENT: Normocephalic, Atraumatic. Bilateral external ears normal. Nose normal.  Moist mucous membranes.  Oropharynx clear.  Eyes: Pupils are equal and reactive. Conjunctiva normal.   Neck: Supple, full range of motion  Heart: Regular rate and rhythm.  No murmurs.    Lungs: No respiratory distress, normal work of breathing. Lungs clear to auscultation bilaterally.  Abdomen Soft, no distention.  No tenderness to palpation. Suprapubic site without surrounding redness or drainage  Musculoskeletal: Atraumatic. No obvious deformities noted.  No lower extremity edema.  Skin: Warm, Dry.  No erythema, No rash.   Neurologic: Alert and oriented x3. Moving all extremities spontaneously without focal deficits.  Psychiatric: Affect normal, Mood normal, Appears appropriate and not intoxicated.    PROCEDURES  Suprapubic catheter placement:  Area was cleaned and prepped with Betadine.  Using sterile procedure 16 Hungarian Andrade catheter was inserted into suprapubic site.  There was some mild resistance however was able to enter the bladder with good urine return.  Patient tolerated well without complication.      ED COURSE  Vitals:    11/06/20 1602 11/06/20 1643 11/06/20 2059   BP: 143/80  140/101   Pulse: 73  76   Resp: 16  16   Temp: 36.2 °C (97.2 °F)  36.3 °C (97.3 °F)   TempSrc: " "Temporal  Temporal   SpO2: 96%  96%   Weight:  115.5 kg (254 lb 10.1 oz)    Height: 1.651 m (5' 5\") 1.651 m (5' 5\")          Medications administered:  Medications - No data to display      MEDICAL DECISION MAKING  8:17 PM Patient seen and examined at bedside. The patient presents after her suprapubic catheter was displaced.  She is nontoxic-appearing with normal vital signs on arrival.  There is no concern for underlying infectious process and her abdominal exam is benign.  Replaced the suprapubic catheter at this time with good return of urine. Discussed discharge instructions and return precautions with the patient and they were cleared for discharge.  She has an established urologist that she can follow-up with as an outpatient.  Patient was given the opportunity to ask any further questions. She is comfortable with discharge at this time.          The patient will return for new or worsening symptoms and is stable at the time of discharge.    The patient is referred to a primary physician for blood pressure management, diabetic screening, and for all other preventative health concerns.    DISPOSITION:  Patient will be discharged home in stable condition.    FOLLOW UP:  Torres Brody M.D.  [address] [facility] [address]  [phone]    Schedule an appointment as soon as possible for a visit       [facility], Emergency Dept  [address] [place] [id number] [phone]    If symptoms worsen      OUTPATIENT MEDICATIONS:  Discharge Medication List as of 11/6/2020  8:41 PM          IMPRESSION  (T83.010A) Suprapubic catheter dysfunction, initial encounter (HCC)    Results, diagnoses, and treatment options were discussed with the patient and/or family. Patient verbalized understanding of plan of care.     Taiwo RODRIGUEZ (Eva), am scribing for, and in the presence of, Phuong Calderon M.D..    Electronically signed by: Taiwo Espinosa), 11/6/2020    Phuong RODRIGUEZ, " M.D. personally performed the services described in this documentation, as scribed by Taiwo Zuñiga in my presence, and it is both accurate and complete.    The note accurately reflects work and decisions made by me.  Phuong Calderon M.D.  11/7/2020  1:50 AM

## 2020-11-09 ENCOUNTER — HOSPITAL ENCOUNTER (EMERGENCY)
Facility: MEDICAL CENTER | Age: 30
End: 2020-11-10
Attending: EMERGENCY MEDICINE
Payer: MEDICARE

## 2020-11-09 ENCOUNTER — APPOINTMENT (OUTPATIENT)
Dept: RADIOLOGY | Facility: MEDICAL CENTER | Age: 30
End: 2020-11-09
Attending: EMERGENCY MEDICINE
Payer: MEDICARE

## 2020-11-09 DIAGNOSIS — R06.00 DYSPNEA, UNSPECIFIED TYPE: ICD-10-CM

## 2020-11-09 LAB
ALBUMIN SERPL BCP-MCNC: 4.2 G/DL (ref 3.2–4.9)
ALBUMIN/GLOB SERPL: 2.3 G/DL
ALP SERPL-CCNC: 58 U/L (ref 30–99)
ALT SERPL-CCNC: 24 U/L (ref 2–50)
ANION GAP SERPL CALC-SCNC: 18 MMOL/L (ref 7–16)
AST SERPL-CCNC: 10 U/L (ref 12–45)
BASOPHILS # BLD AUTO: 0 % (ref 0–1.8)
BASOPHILS # BLD: 0 K/UL (ref 0–0.12)
BILIRUB SERPL-MCNC: 0.2 MG/DL (ref 0.1–1.5)
BUN SERPL-MCNC: 18 MG/DL (ref 8–22)
CALCIUM SERPL-MCNC: 9.4 MG/DL (ref 8.5–10.5)
CHLORIDE SERPL-SCNC: 107 MMOL/L (ref 96–112)
CO2 SERPL-SCNC: 15 MMOL/L (ref 20–33)
CREAT SERPL-MCNC: 0.72 MG/DL (ref 0.5–1.4)
EOSINOPHIL # BLD AUTO: 0.13 K/UL (ref 0–0.51)
EOSINOPHIL NFR BLD: 0.9 % (ref 0–6.9)
ERYTHROCYTE [DISTWIDTH] IN BLOOD BY AUTOMATED COUNT: 48.4 FL (ref 35.9–50)
GLOBULIN SER CALC-MCNC: 1.8 G/DL (ref 1.9–3.5)
GLUCOSE SERPL-MCNC: 152 MG/DL (ref 65–99)
HCG SERPL QL: NEGATIVE
HCT VFR BLD AUTO: 43.9 % (ref 37–47)
HGB BLD-MCNC: 14 G/DL (ref 12–16)
LYMPHOCYTES # BLD AUTO: 0.37 K/UL (ref 1–4.8)
LYMPHOCYTES NFR BLD: 2.6 % (ref 22–41)
MANUAL DIFF BLD: NORMAL
MCH RBC QN AUTO: 28.8 PG (ref 27–33)
MCHC RBC AUTO-ENTMCNC: 31.9 G/DL (ref 33.6–35)
MCV RBC AUTO: 90.3 FL (ref 81.4–97.8)
METAMYELOCYTES NFR BLD MANUAL: 0.9 %
MONOCYTES # BLD AUTO: 0.61 K/UL (ref 0–0.85)
MONOCYTES NFR BLD AUTO: 4.3 % (ref 0–13.4)
MORPHOLOGY BLD-IMP: NORMAL
MYELOCYTES NFR BLD MANUAL: 0.9 %
NEUTROPHILS # BLD AUTO: 12.84 K/UL (ref 2–7.15)
NEUTROPHILS NFR BLD: 88.7 % (ref 44–72)
NEUTS BAND NFR BLD MANUAL: 1.7 % (ref 0–10)
NRBC # BLD AUTO: 0 K/UL
NRBC BLD-RTO: 0 /100 WBC
PLATELET # BLD AUTO: 173 K/UL (ref 164–446)
PLATELET BLD QL SMEAR: NORMAL
PMV BLD AUTO: 11.5 FL (ref 9–12.9)
POTASSIUM SERPL-SCNC: 4.2 MMOL/L (ref 3.6–5.5)
PROT SERPL-MCNC: 6 G/DL (ref 6–8.2)
RBC # BLD AUTO: 4.86 M/UL (ref 4.2–5.4)
RBC BLD AUTO: NORMAL
SODIUM SERPL-SCNC: 140 MMOL/L (ref 135–145)
WBC # BLD AUTO: 14.2 K/UL (ref 4.8–10.8)

## 2020-11-09 PROCEDURE — 85027 COMPLETE CBC AUTOMATED: CPT

## 2020-11-09 PROCEDURE — 71045 X-RAY EXAM CHEST 1 VIEW: CPT

## 2020-11-09 PROCEDURE — 700117 HCHG RX CONTRAST REV CODE 255: Performed by: EMERGENCY MEDICINE

## 2020-11-09 PROCEDURE — 700111 HCHG RX REV CODE 636 W/ 250 OVERRIDE (IP): Performed by: EMERGENCY MEDICINE

## 2020-11-09 PROCEDURE — 84703 CHORIONIC GONADOTROPIN ASSAY: CPT

## 2020-11-09 PROCEDURE — 96375 TX/PRO/DX INJ NEW DRUG ADDON: CPT

## 2020-11-09 PROCEDURE — 96374 THER/PROPH/DIAG INJ IV PUSH: CPT

## 2020-11-09 PROCEDURE — 80053 COMPREHEN METABOLIC PANEL: CPT

## 2020-11-09 PROCEDURE — 85007 BL SMEAR W/DIFF WBC COUNT: CPT

## 2020-11-09 PROCEDURE — 70491 CT SOFT TISSUE NECK W/DYE: CPT

## 2020-11-09 PROCEDURE — 700105 HCHG RX REV CODE 258: Performed by: EMERGENCY MEDICINE

## 2020-11-09 PROCEDURE — 99284 EMERGENCY DEPT VISIT MOD MDM: CPT

## 2020-11-09 RX ORDER — MIDAZOLAM HYDROCHLORIDE 1 MG/ML
1 INJECTION INTRAMUSCULAR; INTRAVENOUS ONCE
Status: COMPLETED | OUTPATIENT
Start: 2020-11-09 | End: 2020-11-09

## 2020-11-09 RX ORDER — SODIUM CHLORIDE, SODIUM LACTATE, POTASSIUM CHLORIDE, CALCIUM CHLORIDE 600; 310; 30; 20 MG/100ML; MG/100ML; MG/100ML; MG/100ML
1000 INJECTION, SOLUTION INTRAVENOUS ONCE
Status: COMPLETED | OUTPATIENT
Start: 2020-11-09 | End: 2020-11-09

## 2020-11-09 RX ORDER — DEXAMETHASONE SODIUM PHOSPHATE 10 MG/ML
10 INJECTION, SOLUTION INTRAMUSCULAR; INTRAVENOUS ONCE
Status: COMPLETED | OUTPATIENT
Start: 2020-11-09 | End: 2020-11-09

## 2020-11-09 RX ADMIN — MIDAZOLAM HYDROCHLORIDE 1 MG: 1 INJECTION, SOLUTION INTRAMUSCULAR; INTRAVENOUS at 22:51

## 2020-11-09 RX ADMIN — SODIUM CHLORIDE, POTASSIUM CHLORIDE, SODIUM LACTATE AND CALCIUM CHLORIDE 1000 ML: 600; 310; 30; 20 INJECTION, SOLUTION INTRAVENOUS at 22:55

## 2020-11-09 RX ADMIN — IOHEXOL 80 ML: 350 INJECTION, SOLUTION INTRAVENOUS at 21:13

## 2020-11-09 RX ADMIN — DEXAMETHASONE SODIUM PHOSPHATE 10 MG: 10 INJECTION INTRAMUSCULAR; INTRAVENOUS at 21:43

## 2020-11-09 ASSESSMENT — FIBROSIS 4 INDEX: FIB4 SCORE: 0.56

## 2020-11-10 ENCOUNTER — NURSE TRIAGE (OUTPATIENT)
Dept: HEALTH INFORMATION MANAGEMENT | Facility: OTHER | Age: 30
End: 2020-11-10

## 2020-11-10 VITALS
DIASTOLIC BLOOD PRESSURE: 89 MMHG | BODY MASS INDEX: 42.42 KG/M2 | HEIGHT: 65 IN | TEMPERATURE: 96.9 F | WEIGHT: 254.63 LBS | RESPIRATION RATE: 18 BRPM | SYSTOLIC BLOOD PRESSURE: 129 MMHG | OXYGEN SATURATION: 95 % | HEART RATE: 56 BPM

## 2020-11-10 NOTE — ED TRIAGE NOTES
"Chief Complaint   Patient presents with   • Shortness of Breath     Pt brought by EMS, c/o sob & \"stridor\" since yesterday, seen at [facility] with no tx as per EMS. Audible noise with exhalation noted, with clear lungs sounds and SpO2 96% on room air. Pt also c/o sharp pain to \"her lungs\".         Vitals:    11/09/20 1941   BP: 136/79   Pulse: 78   Resp: (!) 28   Temp: 36.3 °C (97.3 °F)   SpO2: 96%       "

## 2020-11-10 NOTE — ED NOTES
Discharge teaching and paperwork provided. All questions/concerns answered. VSS, assessment stable and PIV removed. Patient discharged to the care of parents.     MTM called for ride home.

## 2020-11-10 NOTE — TELEPHONE ENCOUNTER
Patient called in with SOB and obvious stridor, complains of feeling like she can't get oxygen but her O2 levels are fine, 2 word sentences, advised to go back to the ED.   Regarding: ACTIVE SX  ----- Message from Amanda Merlino sent at 11/10/2020  2:29 PM PST -----  SEEN IN ER/ [facility]/ 11-/ ACUTE DISPENSIA/ MCR + MCAID - SYMPTOMS CONTINUING/ GETTING WORSE, SCHEDULED WITH PULMONARY FOR 11-

## 2020-11-10 NOTE — ED PROVIDER NOTES
"ED Provider Note    CHIEF COMPLAINT  Chief Complaint   Patient presents with   • Shortness of Breath     Pt brought by EMS, c/o sob & \"stridor\" since yesterday, seen at [facility] with no tx as per EMS. Audible noise with exhalation noted, with clear lungs sounds and SpO2 96% on room air. Pt also c/o sharp pain to \"her lungs\".        HPI  Kristin Balderrama is a 30 y.o. female who presents with shortness of breath since yesterday.  She was seen at [place] and discharged.  Patient has clear lung sounds.  Has had similar symptoms in the past.  Most recently was in July.  She has known history of conversion disorder as diagnosed by psychiatry.  No fever or recent illness.  No known sick contacts.  No chest pain.  No lower extremity swelling.  Patient has very frequent visits to this emergency department.  Has over 25 visits to this emergency department since the beginning of 2020.    REVIEW OF SYSTEMS  See HPI for further details. All other systems are negative.     PAST MEDICAL HISTORY   has a past medical history of Abdominal pain, Anginal syndrome, Apnea, sleep, Arrhythmia, Arthritis, ASTHMA, Atrial fibrillation ([facility]), Back pain, Borderline personality disorder ([facility]), Breath shortness, Bronchitis, Cardiac arrhythmia, Chickenpox, Chronic UTI (9/18/2010), Cough, Daytime sleepiness, Depression, Diabetes ([facility]), Diarrhea, Disorder of thyroid, Fall, Fatigue, Frequent headaches, Gasping for breath, Gynecological disorder, Headache(784.0), Heart burn, History of falling, Hypertension, Indigestion, Migraine, 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    SOCIAL HISTORY  Social History     Tobacco Use   • Smoking status: Never Smoker   • Smokeless tobacco: Never Used   Substance and Sexual Activity   • Alcohol use: No     Alcohol/week: 0.0 oz     Frequency: Never     Binge frequency: Never   • Drug use: Not Currently     Frequency: 7.0 times per week     Types: Marijuana   • Sexual activity: Not Currently     Birth control/protection: Pill       SURGICAL HISTORY   has a past surgical history that includes neuro dest facet l/s w/ig sngl (4/21/2015); recovery (1/27/2016); delmar by laparoscopy (8/29/2010); lumbar fusion anterior (8/21/2012); other cardiac surgery (1/2016); tonsillectomy; bowel stimulator insertion (7/13/2016); gastroscopy with balloon dilatation (N/A, 1/4/2017); muscle biopsy (Right, 1/26/2017); other abdominal surgery; and laminotomy.    CURRENT MEDICATIONS  Home Medications    **Home medications have not yet been reviewed for this encounter**         ALLERGIES  Allergies   Allergen Reactions   • Depakote [Divalproex Sodium] Unspecified     Muscle spasms/muscle pain and weakness     • Amitriptyline Unspecified     Headaches     • Aripiprazole [Abilify] Unspecified     Headaches/muscle twitching     • Clindamycin Nausea     Even with food     • Flagyl [Metronidazole Hcl] Unspecified     \"eye problems\"     • Flomax [Tamsulosin Hydrochloride] Swelling   • Levaquin Unspecified     Severe muscle cramps in legs  RXN=unknown   • Metformin Unspecified     Increased lactic acid      • Tape Rash     Tears skin off  coban with Tegaderm tape ok intermittently  RXN=ongoing   • Vancomycin Itching     Pt becomes flushed in face and chest.   RXN=7/10/16   • Wound Dressing Adhesive Hives     By pt report   • Ciprofloxacin    • Hydromorphone Hcl      Pt states she feels loopy   • Keflex Rash     Pt states she gets a rash with this medication  Tolerates ceftriaxone   • Levofloxacin Unspecified     Leg muscle cramps   • " "Metronidazole Rash     .   • Penicillins Hives   • Tamsulosin Swelling   • Valproic Acid Rash     .       PHYSICAL EXAM  VITAL SIGNS: /79   Pulse 78   Temp 36.3 °C (97.3 °F) (Temporal)   Resp (!) 28   Ht 1.651 m (5' 5\")   Wt 115.5 kg (254 lb 10.1 oz)   SpO2 96%   BMI 42.37 kg/m²   Pulse ox interpretation: I interpret this pulse ox as normal.  Constitutional: Alert in no apparent distress.  HENT: No signs of trauma, Bilateral external ears normal, Nose normal.   Eyes: Pupils are equal and reactive, Conjunctiva normal, Non-icteric.   Neck: Normal range of motion, No tenderness, Supple, No stridor.   Cardiovascular: Regular rate and rhythm.   Thorax & Lungs: Normal breath sounds, No respiratory distress, No wheezing, No chest tenderness.   Abdomen: Bowel sounds normal, Soft, No tenderness, No masses, No pulsatile masses. No peritoneal signs.  Skin: Warm, Dry, No erythema, No rash.   Back: No bony tenderness, No CVA tenderness.   Extremities: Intact distal pulses, No edema, No tenderness, No cyanosis  Musculoskeletal: Good range of motion in all major joints. No tenderness to palpation or major deformities noted.   Neurologic: Alert, Normal motor function and gait, Normal sensory function, No focal deficits noted.       DIAGNOSTIC STUDIES / PROCEDURES      LABS  Labs Reviewed   CBC WITH DIFFERENTIAL - Abnormal; Notable for the following components:       Result Value    WBC 14.2 (*)     MCHC 31.9 (*)     Neutrophils-Polys 88.70 (*)     Lymphocytes 2.60 (*)     Neutrophils (Absolute) 12.84 (*)     Lymphs (Absolute) 0.37 (*)     All other components within normal limits   COMP METABOLIC PANEL - Abnormal; Notable for the following components:    Co2 15 (*)     Anion Gap 18.0 (*)     Glucose 152 (*)     AST(SGOT) 10 (*)     Globulin 1.8 (*)     All other components within normal limits   HCG QUAL SERUM   DIFFERENTIAL MANUAL   PERIPHERAL SMEAR REVIEW   PLATELET ESTIMATE   MORPHOLOGY   ESTIMATED GFR "         RADIOLOGY  CT-SOFT TISSUE NECK WITH   Final Result      No significant mass, adenopathy, fluid collection or inflammatory changes.      DX-CHEST-PORTABLE (1 VIEW)   Final Result      Hypoinflation with minimal basilar atelectasis.          Venous Access Procedure Note    Indication: emergent need for intravenous access, nursing staff unable to obtain access and MD skill needed    Procedure: The patient was placed in the appropriate position and the skin over the puncture site was prepped with Chloraprep. Intravenous access was obtained in the right antecubital vein and the site was secured appropriately.    The patient tolerated the procedure well.    Complications: None        COURSE & MEDICAL DECISION MAKING    Medications   iohexol (OMNIPAQUE) 350 mg/mL (80 mL Intravenous Given 11/9/20 2113)   dexamethasone pf (DECADRON) injection 10 mg (10 mg Intravenous Given 11/9/20 2143)   lactated ringers infusion (BOLUS) (0 mL Intravenous Stopped 11/9/20 2340)   midazolam (VERSED) injection 1 mg (1 mg Intravenous Given 11/9/20 2251)       Pertinent Labs & Imaging studies reviewed. (See chart for details)  30 y.o. female presenting with difficulty with breathing.  She has noisy inspiratory sounds from the upper airway that are reminiscent of stridor.  Posterior pharynx is unremarkable.  No fever.  No wheezing or rales.  No hypoxia.  Chest x-ray is unremarkable.  CT of the neck was performed.  No visible abnormality was identified.  No evidence of retropharyngeal abscess, epiglottitis, Ludwigs angina.  Airway appears to be patent.  Laboratory studies showing leukocytosis of uncertain significance.  No fevers.  Does not appear toxic in appearance.    Patient's chemistry is showing some slight metabolic acidosis.  She does have dry mucous membranes likely from hyperventilation.  Suspect possible dehydration and so she was given IV fluid hydration.    HYDRATION: Based on the patient's presentation of Dehydration the  "patient was given IV fluids. IV Hydration was used because oral hydration was not as rapid as required. Upon recheck following hydration, the patient was improved.    Unclear as to the etiology of the patient's stridulous breath sounds.  She was provided with Decadron in case there is some unseen pharyngeal edema or laryngeal edema that could be contributing to stridor.  No obvious constriction or airway compromise identified on CT.  I did reevaluate the patient and she seemed to be sleeping without any noisy respiration.  Decision was made to discharge the patient - she was reevaluated at bedside and appears stable for outpatient management.  She is agreeable to the plan for discharge.     The patient was instructed to follow-up with primary care physician for further management.  To return immediately for any worsening symptoms or development of any other concerning signs or symptoms. The patient verbalizes understanding in their own words.    /89   Pulse (!) 56   Temp 36.1 °C (96.9 °F) (Temporal)   Resp 18   Ht 1.651 m (5' 5\")   Wt 115.5 kg (254 lb 10.1 oz)   SpO2 95%   BMI 42.37 kg/m²     The patient was referred to primary care where they will receive further BP management.      Torres Brody M.D.  [address] [facility] [id number] [phone]    Schedule an appointment as soon as possible for a visit       [facility], Emergency Dept  [address] [place] [id number] [phone]    As needed, If symptoms worsen      FINAL IMPRESSION  1. Dyspnea, unspecified type            Electronically signed by: Laron Vela M.D., 11/9/2020 7:44 PM    "

## 2020-11-10 NOTE — ED NOTES
Pt medicated with midazolam, LR infusing. Resps quiet and easy at present. No respiratory distress noted.

## 2020-11-10 NOTE — TELEPHONE ENCOUNTER
"  Reason for Disposition  • Stridor    Answer Assessment - Initial Assessment Questions  1. RESPIRATORY STATUS: \"Describe your breathing?\" (e.g., wheezing, shortness of breath, unable to speak, severe coughing)       stridor  2. ONSET: \"When did this breathing problem begin?\"       More than a few days  3. PATTERN \"Does the difficult breathing come and go, or has it been constant since it started?\"       constant  4. SEVERITY: \"How bad is your breathing?\" (e.g., mild, moderate, severe)     - MILD: No SOB at rest, mild SOB with walking, speaks normally in sentences, can lay down, no retractions, pulse < 100.     - MODERATE: SOB at rest, SOB with minimal exertion and prefers to sit, cannot lie down flat, speaks in phrases, mild retractions, audible wheezing, pulse 100-120.     - SEVERE: Very SOB at rest, speaks in single words, struggling to breathe, sitting hunched forward, retractions, pulse > 120       Moderate-severe    Protocols used: BREATHING DIFFICULTY-A-OH    "

## 2020-11-11 ENCOUNTER — APPOINTMENT (OUTPATIENT)
Dept: SLEEP MEDICINE | Facility: MEDICAL CENTER | Age: 30
End: 2020-11-11
Payer: MEDICARE

## 2020-11-20 ENCOUNTER — TELEPHONE (OUTPATIENT)
Dept: CARDIOLOGY | Facility: MEDICAL CENTER | Age: 30
End: 2020-11-20

## 2020-11-20 NOTE — TELEPHONE ENCOUNTER
LINDA Padilla.  You 1 hour ago (1:43 PM)     This is the pt and she has not gone to ED. Can you call and see whats going on.   Thanks, Nhi    Message text        Attempted to call pt. Received busy tone, unable to LVM.

## 2020-11-20 NOTE — TELEPHONE ENCOUNTER
Pt called, she fell and passed out at home. Upon answer the call pt sounded very shaky and sob. Reported irregular elevated HR. advised her to call 911 as her current status is more urgent than can be assisted over the phone especially if she had a syncopal episode with possible head strike.

## 2020-12-07 ENCOUNTER — OFFICE VISIT (OUTPATIENT)
Dept: URGENT CARE | Facility: CLINIC | Age: 30
End: 2020-12-07
Payer: MEDICARE

## 2020-12-07 VITALS
HEART RATE: 108 BPM | DIASTOLIC BLOOD PRESSURE: 76 MMHG | WEIGHT: 237 LBS | BODY MASS INDEX: 39.49 KG/M2 | SYSTOLIC BLOOD PRESSURE: 112 MMHG | OXYGEN SATURATION: 95 % | RESPIRATION RATE: 16 BRPM | TEMPERATURE: 97.6 F | HEIGHT: 65 IN

## 2020-12-07 DIAGNOSIS — Z93.59 SUPRAPUBIC CATHETER (HCC): ICD-10-CM

## 2020-12-07 DIAGNOSIS — N76.0 ACUTE VAGINITIS: ICD-10-CM

## 2020-12-07 DIAGNOSIS — R11.2 INTRACTABLE VOMITING WITH NAUSEA, UNSPECIFIED VOMITING TYPE: ICD-10-CM

## 2020-12-07 PROCEDURE — 99214 OFFICE O/P EST MOD 30 MIN: CPT | Performed by: PHYSICIAN ASSISTANT

## 2020-12-07 ASSESSMENT — ENCOUNTER SYMPTOMS
NAUSEA: 1
CHILLS: 1
VOMITING: 1

## 2020-12-07 ASSESSMENT — VISUAL ACUITY: OU: 1

## 2020-12-07 ASSESSMENT — FIBROSIS 4 INDEX: FIB4 SCORE: 0.37

## 2020-12-08 ENCOUNTER — TELEPHONE (OUTPATIENT)
Dept: CARDIOLOGY | Facility: MEDICAL CENTER | Age: 30
End: 2020-12-08

## 2020-12-08 NOTE — PROGRESS NOTES
Subjective:   Kristin Balderrama is a 30 y.o. female who presents for Vaginal Pain (x 3 days.  She said has skin missing in her Vagina that is causing pain.  She has a suprapubic catheter in. )        Patient with history of recurrent/chronic UTIs with suprapubic catheter presents with vaginal pain x3 days.  States that the pain is burning and severe. She is unsure if she is having vaginal discharge.  She has also been vomiting all weekend.  She is unable to drink fluids and urine output has been about 100 cc/ per day.   She endorses some redness and pain around the catheter.  No discharge at the site of the catheter.  No aggravating or alleviating factors.  They have been applying topical    Review of Systems   Constitutional: Positive for chills and malaise/fatigue.   Gastrointestinal: Positive for nausea and vomiting.   Skin: Positive for rash.       PMH:  has a past medical history of Abdominal pain, Anginal syndrome, Apnea, sleep, Arrhythmia, Arthritis, ASTHMA, Atrial fibrillation ([facility]), Back pain, Borderline personality disorder ([facility]), Breath shortness, Bronchitis, Cardiac arrhythmia, Chickenpox, Chronic UTI (9/18/2010), Cough, Daytime sleepiness, Depression, Diabetes ([facility]), Diarrhea, Disorder of thyroid, Fall, Fatigue, Frequent headaches, Gasping for breath, Gynecological disorder, Headache(784.0), Heart burn, History of falling, Hypertension, Indigestion, Migraine, 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[facility]), 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  MEDS:   Current Outpatient Medications:   •  mycophenolate (CELLCEPT) 500 MG tablet,  Take 2 Tabs by mouth 2 times a day., Disp: 60 Tab, Rfl: 2  •  methocarbamol (ROBAXIN) 750 MG Tab, Take 750 mg by mouth as needed. Indications: Musculoskeletal Pain, Disp: , Rfl:   •  topiramate (TOPAMAX) 25 MG Tab, Take 50 mg by mouth every evening., Disp: , Rfl:   •  OXcarbazepine (TRILEPTAL) 150 MG Tab, TAKE ONE TABLET BY MOUTH TWICE DAILY, Disp: 60 Tab, Rfl: 0  •  calcium carbonate (TUMS EX) 750 MG chewable tablet, Take 2 Tabs by mouth every day. (Patient taking differently: Take 1,500 mg by mouth as needed. Indications: Heartburn, Sour Stomach), Disp: 60 Tab, Rfl: 0  •  vitamin D, Ergocalciferol, (DRISDOL) 1.25 MG (63843 UT) Cap capsule, Take 50,000 Units by mouth every 7 days. On Wed, Disp: , Rfl:   •  lidocaine (XYLOCAINE) 2 % Solution, Take 5 mL by mouth as needed for Throat/Mouth Pain (q6hr PRN throat pain, ok to rinse and spit or swallow)., Disp: 120 mL, Rfl: 0  •  insulin glargine (LANTUS) 100 UNIT/ML Solution, Inject 8 Units as instructed every evening. (Patient taking differently: Inject 12 Units as instructed every evening.), Disp: 10 mL, Rfl: 0  •  fluoxetine (PROZAC) 40 MG capsule, Take 1 Cap by mouth every day., Disp: 30 Cap, Rfl: 0  •  levothyroxine (SYNTHROID) 75 MCG Tab, Take 1 Tab by mouth Every morning on an empty stomach., Disp: 30 Tab, Rfl: 0  •  traZODone (DESYREL) 100 MG Tab, Take 1 Tab by mouth every bedtime. (Patient taking differently: Take 100 mg by mouth every evening.), Disp: 30 Tab, Rfl: 3  •  ziprasidone (GEODON) 40 MG Cap, Take 1 Cap by mouth 2 Times a Day., Disp: 60 Cap, Rfl: 0  •  omeprazole (PRILOSEC) 20 MG delayed-release capsule, Take 1 Cap by mouth every day., Disp: 30 Cap, Rfl: 0  •  oxybutynin (DITROPAN) 5 MG Tab, Take 1 Tab by mouth 3 times a day., Disp: 90 Tab, Rfl: 0  •  acetaZOLAMIDE SR (DIAMOX) 500 MG CAPSULE SR 12 HR, Take 1,000-1,500 mg by mouth 2 times a day. 1500mg in AM 1000mg at PM, Disp: , Rfl:   •  busPIRone (BUSPAR) 10 MG Tab tablet, Take 10 mg by mouth 2 times  "a day., Disp: , Rfl:   •  ivabradine (CORLANOR) 7.5 MG Tab tablet, Take 7.5 mg by mouth 2 times a day, with meals., Disp: , Rfl:   •  ondansetron (ZOFRAN ODT) 4 MG TABLET DISPERSIBLE, Take 4 mg by mouth every 6 hours as needed for Nausea., Disp: , Rfl:   •  predniSONE (DELTASONE) 10 MG Tab, Take 10 mg by mouth every day. Maintenance Medication., Disp: , Rfl:   •  Dulaglutide (TRULICITY) 1.5 MG/0.5ML Solution Pen-injector, Inject 0.5 mL as instructed every Friday., Disp: , Rfl:   •  gabapentin (NEURONTIN) 300 MG Cap, Take 300 mg by mouth every evening., Disp: , Rfl:   •  Mirabegron ER (MYRBETRIQ) 50 MG TABLET SR 24 HR, Take 50 mg by mouth every day., Disp: , Rfl:   •  sodium bicarbonate (SODIUM BICARBONATE) 650 MG Tab, Take 1,950 mg by mouth 2 times a day., Disp: , Rfl:   •  aspirin EC (ECOTRIN) 81 MG Tablet Delayed Response, Take 81 mg by mouth every day., Disp: , Rfl:   •  potassium chloride SA (KDUR) 20 MEQ Tab CR, Take 40 mEq by mouth 2 times a day., Disp: , Rfl:   •  albuterol 108 (90 Base) MCG/ACT Aero Soln inhalation aerosol, Inhale 2 Puffs by mouth every 6 hours as needed for Shortness of Breath., Disp: , Rfl:   •  Melatonin 10 MG Tab, Take 10 mg by mouth every evening., Disp: , Rfl:   ALLERGIES:   Allergies   Allergen Reactions   • Depakote [Divalproex Sodium] Unspecified     Muscle spasms/muscle pain and weakness     • Amitriptyline Unspecified     Headaches     • Aripiprazole [Abilify] Unspecified     Headaches/muscle twitching     • Clindamycin Nausea     Even with food     • Flagyl [Metronidazole Hcl] Unspecified     \"eye problems\"     • Flomax [Tamsulosin Hydrochloride] Swelling   • Levaquin Unspecified     Severe muscle cramps in legs  RXN=unknown   • Metformin Unspecified     Increased lactic acid      • Tape Rash     Tears skin off  coban with Tegaderm tape ok intermittently  RXN=ongoing   • Vancomycin Itching     Pt becomes flushed in face and chest.   RXN=7/10/16   • Wound Dressing Adhesive Hives " "    By pt report   • Ciprofloxacin    • Hydromorphone Hcl      Pt states she feels loopy   • Keflex Rash     Pt states she gets a rash with this medication  Tolerates ceftriaxone   • Levofloxacin Unspecified     Leg muscle cramps   • Metronidazole Rash     .   • Penicillins Hives   • Tamsulosin Swelling   • Valproic Acid Rash     .     SURGHX:   Past Surgical History:   Procedure Laterality Date   • MUSCLE BIOPSY Right 1/26/2017    Procedure: MUSCLE BIOPSY - THIGH;  Surgeon: Isidro Vigil M.D.;  Location: SURGERY [facility];  Service:    • GASTROSCOPY WITH BALLOON DILATATION N/A 1/4/2017    Procedure: GASTROSCOPY WITH DILATATION;  Surgeon: Torres Vargas M.D.;  Location: SURGERY [facility];  Service:    • BOWEL STIMULATOR INSERTION  7/13/2016    Procedure: BOWEL STIMULATOR INSERTION FOR PERMANENT INTERSTIM SACRAL IMPLANT;  Surgeon: Joe Noyola M.D.;  Location: SURGERY [facility];  Service:    • RECOVERY  1/27/2016    Procedure: CATH LAB EP STUDY WITH SINUS NODE MODIFICATION LA;  Surgeon: [facility];  [place] [facility] [place]  Service:    • OTHER CARDIAC SURGERY  1/2016    cardiac ablation   • NEURO DEST FACET L/S W/IG SNGL  4/21/2015    Performed by Reza Tabor at [facility]   • LUMBAR FUSION ANTERIOR  8/21/2012    Performed by SUSIE SAWANT at SURGERY [facility] ORS   • ALYSSA BY LAPAROSCOPY  8/29/2010    Performed by SHAYY JOHNS at [facility] ORS   • LAMINOTOMY     • OTHER ABDOMINAL SURGERY     • TONSILLECTOMY      tonsillectomy     SOCHX:  reports that she has never smoked. She has never used smokeless tobacco. She reports previous drug use. Frequency: 7.00 times per week. Drug: Marijuana. She reports that she does not drink alcohol.  FH: Family history was reviewed, no pertinent findings to report   Objective:   /76   Pulse (!) 108   Temp 36.4 °C (97.6 °F) (Temporal)   Resp 16   Ht 1.651 m (5' 5\")   Wt 107.5 kg (237 lb)  "  SpO2 95%   BMI 39.44 kg/m²   Physical Exam  Exam conducted with a chaperone present.   Constitutional:       General: She is awake.      Appearance: She is obese. She is ill-appearing.   HENT:      Head: Normocephalic and atraumatic.   Eyes:      General: Vision grossly intact. Gaze aligned appropriately.   Cardiovascular:      Rate and Rhythm: Regular rhythm. Tachycardia present.   Pulmonary:      Effort: Pulmonary effort is normal.   Abdominal:       Genitourinary:         Comments: Erythematous macular papular rash diffusely distributed on the labia, perianal and buttocks.  There are satellite lesions.  Vaginal introitus is erythematous and edematous with moderate amount of thick yellow discharge.  Labia tender to palpation.  Neurological:      Mental Status: She is alert.   Psychiatric:         Behavior: Behavior is cooperative.           Assessment/Plan:   1. Intractable vomiting with nausea, unspecified vomiting type    2. Acute vaginitis    3. Suprapubic catheter (HCC)    Patient appears dehydrated- her urine is quite dark and she is tachycardic.  Patient actively vomiting during exam.    Physical exam consistent with candidal vulvovaginitis.  However I am concerned about developing soft tissue infection around her catheter, as well as UTI.  Patient's medical records reviewed.  She is allergic to numerous antibiotics and has history of bacterial resistance to numerous antibiotics, as well.  She is ill-appearing and I feel that she needs additional work-up at a higher level of care.      I discussed my concerns with patient and she is amenable to going to [facility] ED for further evaluation.  Her grandmother will drive her directly.    Differential diagnosis, natural history, supportive care, and indications for immediate follow-up discussed.

## 2020-12-08 NOTE — TELEPHONE ENCOUNTER
"Received this question regarding pt in a [employer] message from pt's family member:      \"My granddaughter - Kristin Balderrama b/d 10[birth year] - has been taking Corlanor  7.5 mg for quite some time.  When she was discharged from [facility] Dec 4th, the discharge papers said to stop taking Corlanor.  I don't know why.  She had to cancel her appt  with you last week because she was in the hospital - she rescheduled  for Jan 4th.  Should she continue the Corlanor?\"    Will forward to DB's nurse  "

## 2020-12-09 NOTE — TELEPHONE ENCOUNTER
Jo Ann Singh R.N.  You 17 hours ago (3:18 PM)     This was a bit confusing, but I'll forward you the [employer] message as well.   Thanks!    Routing comment        MyChart message did not come from this pt's chart, but her grandma's. Informed the grandma that due to privacy policies I cannot address pt's concerns that way and asked her to have the pt contact me herself via phone or through pt's [employer].

## 2020-12-16 ENCOUNTER — TELEMEDICINE (OUTPATIENT)
Dept: BEHAVIORAL HEALTH | Facility: CLINIC | Age: 30
End: 2020-12-16
Payer: MEDICARE

## 2020-12-16 ENCOUNTER — TELEMEDICINE (OUTPATIENT)
Dept: MEDICAL GROUP | Facility: MEDICAL CENTER | Age: 30
End: 2020-12-16
Payer: MEDICARE

## 2020-12-16 VITALS — WEIGHT: 237 LBS | HEIGHT: 65 IN | BODY MASS INDEX: 39.49 KG/M2

## 2020-12-16 VITALS — WEIGHT: 237 LBS | BODY MASS INDEX: 39.49 KG/M2 | HEIGHT: 65 IN

## 2020-12-16 DIAGNOSIS — E06.3 HYPOTHYROIDISM DUE TO HASHIMOTO'S THYROIDITIS: Chronic | ICD-10-CM

## 2020-12-16 DIAGNOSIS — E03.8 HYPOTHYROIDISM DUE TO HASHIMOTO'S THYROIDITIS: Chronic | ICD-10-CM

## 2020-12-16 DIAGNOSIS — K76.0 FATTY LIVER DISEASE, NONALCOHOLIC: ICD-10-CM

## 2020-12-16 DIAGNOSIS — H46.9 OPTIC NEURITIS: ICD-10-CM

## 2020-12-16 DIAGNOSIS — R56.9 SEIZURE (HCC): ICD-10-CM

## 2020-12-16 DIAGNOSIS — E11.69 DIABETES MELLITUS TYPE 2 IN OBESE: ICD-10-CM

## 2020-12-16 DIAGNOSIS — F44.81 DISSOCIATIVE IDENTITY DISORDER (HCC): ICD-10-CM

## 2020-12-16 DIAGNOSIS — N39.46 MIXED STRESS AND URGE URINARY INCONTINENCE: ICD-10-CM

## 2020-12-16 DIAGNOSIS — E11.9 TYPE 2 DIABETES MELLITUS WITHOUT COMPLICATION, WITH LONG-TERM CURRENT USE OF INSULIN (HCC): ICD-10-CM

## 2020-12-16 DIAGNOSIS — Z79.4 TYPE 2 DIABETES MELLITUS WITHOUT COMPLICATION, WITH LONG-TERM CURRENT USE OF INSULIN (HCC): ICD-10-CM

## 2020-12-16 DIAGNOSIS — E66.01 MORBID OBESITY (HCC): ICD-10-CM

## 2020-12-16 DIAGNOSIS — E87.6 HYPOKALEMIA: ICD-10-CM

## 2020-12-16 DIAGNOSIS — F25.1 SCHIZOAFFECTIVE DISORDER, DEPRESSIVE TYPE (HCC): ICD-10-CM

## 2020-12-16 DIAGNOSIS — F20.9 SCHIZOPHRENIA, UNSPECIFIED TYPE (HCC): ICD-10-CM

## 2020-12-16 DIAGNOSIS — E66.9 DIABETES MELLITUS TYPE 2 IN OBESE: ICD-10-CM

## 2020-12-16 DIAGNOSIS — I47.10 SVT (SUPRAVENTRICULAR TACHYCARDIA) (HCC): ICD-10-CM

## 2020-12-16 DIAGNOSIS — E55.9 VITAMIN D DEFICIENCY: ICD-10-CM

## 2020-12-16 DIAGNOSIS — G93.2 INTRACRANIAL HYPERTENSION: ICD-10-CM

## 2020-12-16 DIAGNOSIS — F25.9 SCHIZOAFFECTIVE DISORDER, UNSPECIFIED TYPE (HCC): ICD-10-CM

## 2020-12-16 DIAGNOSIS — F41.9 ANXIETY: ICD-10-CM

## 2020-12-16 PROBLEM — M79.671 PAIN IN BOTH FEET: Status: RESOLVED | Noted: 2020-08-17 | Resolved: 2020-12-16

## 2020-12-16 PROBLEM — R53.81 DEBILITY: Status: RESOLVED | Noted: 2020-09-13 | Resolved: 2020-12-16

## 2020-12-16 PROBLEM — R63.8 POOR FLUID INTAKE: Status: RESOLVED | Noted: 2020-09-07 | Resolved: 2020-12-16

## 2020-12-16 PROBLEM — R53.83 OTHER FATIGUE: Status: RESOLVED | Noted: 2020-06-29 | Resolved: 2020-12-16

## 2020-12-16 PROBLEM — R53.1 GENERALIZED WEAKNESS: Status: RESOLVED | Noted: 2019-09-30 | Resolved: 2020-12-16

## 2020-12-16 PROBLEM — R35.0 INCREASED FREQUENCY OF URINATION: Status: RESOLVED | Noted: 2019-12-27 | Resolved: 2020-12-16

## 2020-12-16 PROBLEM — N39.3 STRESS INCONTINENCE: Status: RESOLVED | Noted: 2019-12-27 | Resolved: 2020-12-16

## 2020-12-16 PROBLEM — Z86.79 HISTORY OF ATRIAL FIBRILLATION: Status: RESOLVED | Noted: 2019-11-06 | Resolved: 2020-12-16

## 2020-12-16 PROBLEM — M79.672 PAIN IN BOTH FEET: Status: RESOLVED | Noted: 2020-08-17 | Resolved: 2020-12-16

## 2020-12-16 PROBLEM — R82.71 BACTERIURIA: Status: RESOLVED | Noted: 2017-05-13 | Resolved: 2020-12-16

## 2020-12-16 PROBLEM — H53.8 BLURRED VISION: Status: RESOLVED | Noted: 2020-08-15 | Resolved: 2020-12-16

## 2020-12-16 PROBLEM — S69.91XA WRIST INJURY, RIGHT, INITIAL ENCOUNTER: Status: RESOLVED | Noted: 2020-08-21 | Resolved: 2020-12-16

## 2020-12-16 PROCEDURE — 90833 PSYTX W PT W E/M 30 MIN: CPT | Mod: 95,CR | Performed by: PSYCHIATRY & NEUROLOGY

## 2020-12-16 PROCEDURE — 99213 OFFICE O/P EST LOW 20 MIN: CPT | Mod: 95,CR | Performed by: PSYCHIATRY & NEUROLOGY

## 2020-12-16 PROCEDURE — 99214 OFFICE O/P EST MOD 30 MIN: CPT | Performed by: FAMILY MEDICINE

## 2020-12-16 RX ORDER — FLUOXETINE HYDROCHLORIDE 40 MG/1
40 CAPSULE ORAL DAILY
Qty: 90 CAP | Refills: 0 | Status: SHIPPED | OUTPATIENT
Start: 2020-12-16 | End: 2020-12-23 | Stop reason: SDUPTHER

## 2020-12-16 RX ORDER — DULAGLUTIDE 3 MG/.5ML
3 INJECTION, SOLUTION SUBCUTANEOUS
Qty: 0.5 ML | Refills: 6 | Status: SHIPPED | OUTPATIENT
Start: 2020-12-16 | End: 2020-12-22 | Stop reason: SDUPTHER

## 2020-12-16 RX ORDER — BUSPIRONE HYDROCHLORIDE 10 MG/1
TABLET ORAL
Qty: 405 TAB | Refills: 0 | Status: SHIPPED
Start: 2020-12-16 | End: 2020-12-23

## 2020-12-16 RX ORDER — OXCARBAZEPINE 300 MG/1
TABLET, FILM COATED ORAL
Qty: 180 TAB | Refills: 0 | Status: SHIPPED | OUTPATIENT
Start: 2020-12-16 | End: 2020-12-23 | Stop reason: SDUPTHER

## 2020-12-16 RX ORDER — TRAZODONE HYDROCHLORIDE 100 MG/1
TABLET ORAL
Qty: 90 TAB | Refills: 0 | Status: SHIPPED | OUTPATIENT
Start: 2020-12-16 | End: 2020-12-23 | Stop reason: SDUPTHER

## 2020-12-16 RX ORDER — ZIPRASIDONE HYDROCHLORIDE 40 MG/1
CAPSULE ORAL
Qty: 180 CAP | Refills: 0 | Status: SHIPPED | OUTPATIENT
Start: 2020-12-16 | End: 2020-12-23 | Stop reason: SDUPTHER

## 2020-12-16 RX ORDER — IPRATROPIUM BROMIDE AND ALBUTEROL SULFATE 2.5; .5 MG/3ML; MG/3ML
3 SOLUTION RESPIRATORY (INHALATION) EVERY 4 HOURS PRN
Status: ON HOLD | COMMUNITY
End: 2021-04-19 | Stop reason: SDUPTHER

## 2020-12-16 ASSESSMENT — FIBROSIS 4 INDEX
FIB4 SCORE: 0.37
FIB4 SCORE: 0.37

## 2020-12-16 NOTE — ASSESSMENT & PLAN NOTE
"Patient reports she was diagnosed about a year ago. She is currently lantus 15U at night and trulicity 1.5mg once weekly. She admits she misses frequent lantus doses, usually takes lantus 3 nights weekly. She reports history of lactic acidosis with metformin.     She reports \"I eat what my family eats, whatever is tossed my direction I'll eat it.\" She reports eating a lot of pasta and burritos. She drinks ginger ale and cherry seven-up \"to stay hydrated\". She does try to eat salads.    "

## 2020-12-16 NOTE — PROGRESS NOTES
Virtual Visit: New Patient     This evaluation was conducted via Zoom using secure and encrypted videoconferencing technology. The patient was in a private location in the [facility].   The patient's identity was confirmed and verbal consent was obtained for this virtual visit.      Subjective:     CC:   Kristin Balderrama is a 30 y.o. female presenting to [facility]. She has an extensive medical history. She states she is disabled due to mental and physical conditions. She lives with her grandmother who is her caregiver. Chart review reveals she has been to [facility] >25 times over the past year in addition to [facility] EDs.   Previous PCP: Dr. Torres Brody  Urology: Dr. Rosenbaum  Neuro ophthalmology: Dr. Yousif  Cardiology: Dr. Leon   Psychiatry: Dr. La Lutz (establishing today)  : Patient states none    Schizophrenia (HCC)  The patient reports she was diagnosed with schizophrenia many years ago along with several other conditions including depression, anxiety, disassociative identity disorder. She is establishing care with Dr. La Lutz later today.   She is on geodon 40mg BID, Prozac 40 mg daily, BuSpar 10 mg twice daily as needed, and trazodone 50 mg for sleep.        Hypokalemia  This is a chronic problem. Patient takes potassium chloride 40mEq BID.    Hypothyroidism  Patient reports she has been on levothyroxine for several years for Hashiomoto's. She reports compliance with levothyroxine 75mcg q morning.    Mixed stress and urge urinary incontinence  The patient reports she has had a suprapubic catheter, placed by Dr. Rosenbaum 8/6/20, as patient reports chronic urinary retention. Patient does not currently have a follow up appointment - she will call today.  She is on mirabegron 50 mg daily and Oxybutynin 5 mg 3 times daily.    Optic neuritis  The patient follows with Dr. Yousif, most recent episode requiring hospitalization 10/29/20-11/3/20, tx with high  "dose steroids; now on daily cellcept. She has an appointment 12/30/20 with Dr. Yousif.    SVT (supraventricular tachycardia) ([facility])  The patient has history of SVT s/p ablation about 4 years ago per patient. She is on ivabradine 7.5mg BID. She reports her palpitations have improved s/p starting ivabradine. She has an appointment with Dr. Leon 1/4/20.    Seizure ([facility])  The patient reports possible history of seizure but states \"maybe its Parkinson's disease.\" She is on topiramate 50mg BID and trileptal 150mg BID. She has not seen a neurologist in several months and is not currently established.    Intracranial hypertension  The patient reports compliance with diamox 500mg BID. The patient reports Dr. Yousif is following this issue.    Vitamin D deficiency  This is a chronic problem.  The patient reports she has been on ergocalciferol 50,000 units q. 7 days for a couple months.  She states she was started on this medication by nephrology.    Diabetes mellitus type 2 in obese ([facility])  Patient reports she was diagnosed about a year ago. Last A1C 5.2 on 10/9/20. She is currently lantus 15U at night and trulicity 1.5mg once weekly. She admits she misses frequent lantus doses, usually takes lantus 3 nights weekly. She reports history of lactic acidosis with metformin.     She reports \"I eat what my family eats, whatever is tossed my direction I'll eat it.\" She reports eating a lot of pasta and burritos. She drinks ginger ale and cherry seven-up \"to stay hydrated\". She does try to eat salads.        ROS  See HPI  Constitutional: Negative for fever  HENT: Negative for congestion.    Respiratory: Negative for cough  Cardiovascular: + palpitations; no chest pain  Gastrointestinal: + intermittent diarrhea  Neurological: No focal weakness or numbness  Endo/Heme/Allergies: Does not bruise/bleed easily.   Psychiatric/Behavioral: See above    Allergies   Allergen Reactions   • Depakote [Divalproex Sodium] Unspecified     " "Muscle spasms/muscle pain and weakness     • Amitriptyline Unspecified     Headaches     • Aripiprazole [Abilify] Unspecified     Headaches/muscle twitching     • Clindamycin Nausea     Even with food     • Flagyl [Metronidazole Hcl] Unspecified     \"eye problems\"     • Flomax [Tamsulosin Hydrochloride] Swelling   • Levaquin Unspecified     Severe muscle cramps in legs  RXN=unknown   • Metformin Unspecified     Increased lactic acid      • Tape Rash     Tears skin off  coban with Tegaderm tape ok intermittently  RXN=ongoing   • Vancomycin Itching     Pt becomes flushed in face and chest.   RXN=7/10/16   • Wound Dressing Adhesive Hives     By pt report   • Ciprofloxacin    • Hydromorphone Hcl      Pt states she feels loopy   • Keflex Rash     Pt states she gets a rash with this medication  Tolerates ceftriaxone   • Levofloxacin Unspecified     Leg muscle cramps   • Metronidazole Rash     .   • Penicillins Hives   • Tamsulosin Swelling   • Valproic Acid Rash     .       Current medicines (including changes today)  Current Outpatient Medications   Medication Sig Dispense Refill   • ipratropium-albuterol (DUONEB) 0.5-2.5 (3) MG/3ML nebulizer solution Take 3 mL by nebulization 4 times a day. Nebulizer     • Dulaglutide (TRULICITY) 3 MG/0.5ML Solution Pen-injector Inject 3 mg under the skin every 7 days. 0.5 mL 6   • mycophenolate (CELLCEPT) 500 MG tablet Take 2 Tabs by mouth 2 times a day. (Patient taking differently: Take 500 mg by mouth 2 times a day.) 60 Tab 2   • methocarbamol (ROBAXIN) 750 MG Tab Take 750 mg by mouth as needed. Indications: Musculoskeletal Pain     • topiramate (TOPAMAX) 25 MG Tab Take 50 mg by mouth 2 times a day.     • OXcarbazepine (TRILEPTAL) 150 MG Tab TAKE ONE TABLET BY MOUTH TWICE DAILY 60 Tab 0   • calcium carbonate (TUMS EX) 750 MG chewable tablet Take 2 Tabs by mouth every day. (Patient taking differently: Take 1,500 mg by mouth as needed. Indications: Heartburn, Sour Stomach) 60 Tab 0   • " vitamin D, Ergocalciferol, (DRISDOL) 1.25 MG (24169 UT) Cap capsule Take 50,000 Units by mouth every 7 days. On Wed     • insulin glargine (LANTUS) 100 UNIT/ML Solution Inject 8 Units as instructed every evening. (Patient taking differently: Inject 15 Units under the skin every evening.) 10 mL 0   • fluoxetine (PROZAC) 40 MG capsule Take 1 Cap by mouth every day. 30 Cap 0   • levothyroxine (SYNTHROID) 75 MCG Tab Take 1 Tab by mouth Every morning on an empty stomach. 30 Tab 0   • traZODone (DESYREL) 100 MG Tab Take 1 Tab by mouth every bedtime. (Patient taking differently: Take 50 mg by mouth every evening.) 30 Tab 3   • ziprasidone (GEODON) 40 MG Cap Take 1 Cap by mouth 2 Times a Day. 60 Cap 0   • oxybutynin (DITROPAN) 5 MG Tab Take 1 Tab by mouth 3 times a day. 90 Tab 0   • acetaZOLAMIDE SR (DIAMOX) 500 MG CAPSULE SR 12 HR Take 1,000-1,500 mg by mouth 2 times a day. 1500mg in AM  1000mg at PM     • busPIRone (BUSPAR) 10 MG Tab tablet Take 10 mg by mouth 2 times a day.     • ivabradine (CORLANOR) 7.5 MG Tab tablet Take 7.5 mg by mouth 2 times a day, with meals.     • gabapentin (NEURONTIN) 300 MG Cap Take 300 mg by mouth every evening.     • Mirabegron ER (MYRBETRIQ) 50 MG TABLET SR 24 HR Take 50 mg by mouth every day.     • sodium bicarbonate (SODIUM BICARBONATE) 650 MG Tab Take 325 mg by mouth 2 times a day.     • aspirin EC (ECOTRIN) 81 MG Tablet Delayed Response Take 81 mg by mouth every day.     • potassium chloride SA (KDUR) 20 MEQ Tab CR Take 40 mEq by mouth 2 times a day.     • albuterol 108 (90 Base) MCG/ACT Aero Soln inhalation aerosol Inhale 2 Puffs by mouth every 6 hours as needed for Shortness of Breath.     • omeprazole (PRILOSEC) 20 MG delayed-release capsule Take 1 Cap by mouth every day. 30 Cap 0   • ondansetron (ZOFRAN ODT) 4 MG TABLET DISPERSIBLE Take 4 mg by mouth every 6 hours as needed for Nausea.       No current facility-administered medications for this visit.        She  has a past medical  history of Abdominal pain, Anginal syndrome, Apnea, sleep, Arrhythmia, Arthritis, ASTHMA, Atrial fibrillation (HCC), Back pain, Borderline personality disorder ([facility]), Breath shortness, Bronchitis, Cardiac arrhythmia, Chickenpox, Chronic UTI (9/18/2010), Cough, Daytime sleepiness, Depression, Diabetes ([facility]), Diarrhea, Disorder of thyroid, Fall, Fatigue, Frequent headaches, Gasping for breath, Gynecological disorder, Headache(784.0), Heart burn, History of falling, Hypertension, Indigestion, Migraine, 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[facility]), 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  She  has a past surgical history that includes neuro dest facet l/s w/ig sngl (4/21/2015); recovery (1/27/2016); delmar by laparoscopy (8/29/2010); lumbar fusion anterior (8/21/2012); other cardiac surgery (1/2016); tonsillectomy; bowel stimulator insertion (7/13/2016); gastroscopy with balloon dilatation (N/A, 1/4/2017); muscle biopsy (Right, 1/26/2017); other abdominal surgery; and laminotomy.      Family History   Problem Relation Age of Onset   • Hypertension Mother    • Sleep Apnea Mother    • Heart Disease Mother    • Other Mother         hypothryod   • Hypertension Maternal Uncle    • Heart Disease Maternal Grandmother    • Hypertension Maternal Grandmother    • No Known Problems Sister    • Other Sister         Narcolepsy;fibromyalsia;bone;nerve   • Genitourinary () Problems Sister         endometriosis     Family Status   Relation Name Status   • Mo thyroid disease,IBS Alive        44 2016   • MUnc  Alive   • MGMo  Alive   • Fa  Alive        bio father hx  unknown   • Sis  Alive   • Sis  Alive       Patient Active Problem List    Diagnosis Date Noted   • ESBL Klebsiella UTI (urinary tract infection) due to urinary indwelling catheter ([facility]) 10/11/2020     Priority: High   • Intracranial hypertension 02/27/2020     Priority: High   • Optic neuritis 05/27/2019     Priority: High   • Polypharmacy 06/27/2016     Priority: High   • Bilateral heel pain secondary to calcaneal bone spurs 03/28/2016     Priority: High   • Hypokalemia 09/29/2019     Priority: Medium   • Diabetes mellitus type 2 in obese ([facility]) 04/13/2017     Priority: Medium   • Presence of suprapubic catheter ([facility]) 02/16/2017     Priority: Medium   • Immunocompromised ([facility]) 11/06/2019     Priority: Low   • Hypothyroidism 08/04/2017     Priority: Low   • Depression 10/28/2016     Priority: Low   • Schizophrenia ([facility]) 10/27/2016     Priority: Low   • Morbidly obese ([facility]) 03/07/2016     Priority: Low   • GERD (gastroesophageal reflux disease) 03/07/2016     Priority: Low   • Peripheral neuropathy and chornic pain syndrome ([facility]) 03/06/2016     Priority: Low   • Fatty liver disease, nonalcoholic 01/19/2015     Priority: Low   • Anxiety 12/16/2014     Priority: Low   • Knee pain, right 02/13/2014     Priority: Low   • Borderline personality disorder in adult ([facility]) 09/18/2010     Priority: Low   • Seizure ([facility]) 09/04/2020   • Dry eyes 08/18/2020   • 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[facility]) 08/15/2020   • Idiopathic intracranial hypertension 08/15/2020   • History of supraventricular tachycardia 04/20/2020   • Schizoaffective disorder, depressive type ([facility]) 03/02/2020   • PTSD (post-traumatic stress disorder) 03/02/2020   • Mixed stress and urge urinary incontinence 12/27/2019   • History of optic neuritis 12/17/2019   • Mild intermittent asthma without complication 12/16/2019   • Hypocalcemia 11/30/2019   • SVT (supraventricular tachycardia) ([facility]) 04/10/2019   • Functional diarrhea 01/05/2018   • Vitamin D deficiency 05/21/2016  "  • Scoliosis 03/07/2016   • Psychiatric disorder 10/22/2009          Objective:   Vitals obtained by patient:  Ht 1.651 m (5' 5\")   Wt 107.5 kg (237 lb)   BMI 39.44 kg/m²     Physical Exam:  Constitutional: Alert, no distress, well-groomed.  Skin: No rashes in visible areas.  Eye: Round. Conjunctiva clear, lids normal. No icterus.   ENMT: Lips pink without lesions, good dentition, moist mucous membranes. Phonation normal.  CV: Pulse as reported by patient  Respiratory: Unlabored respiratory effort, no cough or audible wheeze  Psych: Alert and oriented x3, normal affect and mood.       Assessment and Plan:   The following treatment plan was discussed:     1. Schizophrenia, unspecified type (HCC)  Patient reports h/o schizophrenia, depression, and anxiety; she is establishing with Dr. La Lutz this afternoon. The patient reports difficulty managing her many specialists and appointments. She does not currently have a . She agrees this may be very beneficial.  - REFERRAL TO CARE MANAGEMENT    2. Hypokalemia  - Continue daily KCl 40mEq BID  - Comp Metabolic Panel; Future    3. Hypothyroidism due to Hashimoto's thyroiditis  - Continue levothyroxine 75mcg q morning  - Comp Metabolic Panel; Future  - HEMOGLOBIN A1C; Future  - Lipid Profile; Future  - CBC WITH DIFFERENTIAL; Future  - TSH WITH REFLEX TO FT4; Future    4. Mixed stress and urge urinary incontinence  - Patient following with Dr. Rosenbaum, suprapubic cath in place  - Continue mirabegron and oxybutynin for Dr. Rosenbaum    5. Optic neuritis  - Patient following with Dr. Yousif  - Continue daily CellCept     6. SVT (supraventricular tachycardia) (HCC)  - Patient following with Dr. Leon, s/p ablation, on Corlanor    7. Type 2 diabetes mellitus without complication, with long-term current use of insulin ([facility])    8. Seizure (HCC)  - REFERRAL TO NEUROLOGY    9. Intracranial hypertension  - Continue diamox 500mg BID    10. Diabetes mellitus type 2 in " obese (HCC)  Last A1C 5.2 on 10/9/20. Patient reports frequent non-compliance with insulin.  - Stop insulin  - Increase trulicity to 3mg daily  - Strongly recommended patient stop all soda and sugary beverages  - REFERRAL TO [facility] IMPROVEMENT PROGRAMS (HIP)  - Dulaglutide (TRULICITY) 3 MG/0.5ML Solution Pen-injector; Inject 3 mg under the skin every 7 days.  Dispense: 0.5 mL; Refill: 6  - Comp Metabolic Panel; Future  - HEMOGLOBIN A1C; Future  - Lipid Profile; Future  - MICROALBUMIN CREAT RATIO URINE; Future  - CBC WITH DIFFERENTIAL; Future    11. Vitamin D deficiency  - VITAMIN D,25 HYDROXY; Future    12. Fatty liver disease, nonalcoholic  - Comp Metabolic Panel; Future  - HEMOGLOBIN A1C; Future  - Lipid Profile; Future    13. Morbidly obese (HCC)  - REFERRAL TO [facility] IMPROVEMENT PROGRAMS (HIP)      Follow-up: Return in about 1 month (around 1/16/2021).       Please note this dictation was created using voice recognition software. I have made every reasonable attempt to correct obvious errors, but I expect there may be errors of grammar, and possibly content, that I did not discover before finalizing the note.

## 2020-12-16 NOTE — ASSESSMENT & PLAN NOTE
The patient reports compliance with diamox 500mg BID. The patient reports Dr. Yousif is following this issue.

## 2020-12-16 NOTE — LETTER
[facility]  Sue Preston M.D.  [address]  [place] [address]  Fax: [phone]   Authorization for Release/Disclosure of   Protected Health Information   Name: KRISTIN DE LA CRUZ : 1990 SSN: [id number]   Address: [address] [place] [address]  [place] [address] Phone:    [phone] (home)    I authorize the entity listed below to release/disclose the PHI below to:   [facility]/Sue Preston M.D. and Sue Preston M.D.   Provider or Entity Name:[facility]     Address   City, [facility], [facility]   Phone:[phone]      Fax:[phone]     Reason for request: continuity of care   Information to be released:    [  ] LAST COLONOSCOPY,  including any PATH REPORT and follow-up  [  ] LAST FIT/COLOGUARD RESULT [  ] LAST DEXA  [  ] LAST MAMMOGRAM  [  ] LAST PAP  [  ] LAST LABS [  ] RETINA EXAM REPORT  [  ] IMMUNIZATION RECORDS  [ xxxx ] Release all info      [  ] Check here and initial the line next to each item to release ALL health information INCLUDING  _____ Care and treatment for drug and / or alcohol abuse  _____ HIV testing, infection status, or AIDS  _____ Genetic Testing    DATES OF SERVICE OR TIME PERIOD TO BE DISCLOSED: _____________  I understand and acknowledge that:  * This Authorization may be revoked at any time by you in writing, except if your health information has already been used or disclosed.  * Your health information that will be used or disclosed as a result of you signing this authorization could be re-disclosed by the recipient. If this occurs, your re-disclosed health information may no longer be protected by State or Federal laws.  * You may refuse to sign this Authorization. Your refusal will not affect your ability to obtain treatment.  * This Authorization becomes effective upon signing and will  on (date) __________.      If no date is indicated, this Authorization will  one (1) year from the signature date.    Name: Kristin Armstrong  [place]    Signature:Continuation of Care   Date:     12/16/2020       PLEASE FAX REQUESTED RECORDS BACK TO: [phone]

## 2020-12-16 NOTE — ASSESSMENT & PLAN NOTE
The patient has history of SVT s/p ablation about 4 years ago per patient. She is on ivabradine 7.5mg BID. She reports her palpitations have improved s/p starting ivabradine. She has an appointment with Dr. Leon 1/4/20.

## 2020-12-16 NOTE — ASSESSMENT & PLAN NOTE
The patient reports she has had a suprapubic catheter, placed by Dr. Rosenbaum 8/6/20, as patient reports chronic urinary retention. Patient does not currently have a follow up appointment - she will call today.  She is on mirabegron 50 mg daily and Oxybutynin 5 mg 3 times daily.

## 2020-12-16 NOTE — LETTER
[facility]  Sue Preston M.D.  [address]  [place] [address]  Fax: [phone]   Authorization for Release/Disclosure of   Protected Health Information   Name: HARLEY DE LA CRUZ : 1990 SSN: [id number]   Address: [address] [place] [address]  [place] [address] Phone:    [phone] (home)    I authorize the entity listed below to release/disclose the PHI below to:   [facility]/Sue Preston M.D. and Sue Preston M.D.   Provider or Entity Name:[place]      Address   City, [facility], [facility]   Phone:[phone]      Fax:[phone]      Reason for request: continuity of care   Information to be released:    [  ] LAST COLONOSCOPY,  including any PATH REPORT and follow-up  [  ] LAST FIT/COLOGUARD RESULT [  ] LAST DEXA  [  ] LAST MAMMOGRAM  [  ] LAST PAP  [  ] LAST LABS [  ] RETINA EXAM REPORT  [  ] IMMUNIZATION RECORDS  [ xxx ] Release all info      [  ] Check here and initial the line next to each item to release ALL health information INCLUDING  _____ Care and treatment for drug and / or alcohol abuse  _____ HIV testing, infection status, or AIDS  _____ Genetic Testing    DATES OF SERVICE OR TIME PERIOD TO BE DISCLOSED: _____________  I understand and acknowledge that:  * This Authorization may be revoked at any time by you in writing, except if your health information has already been used or disclosed.  * Your health information that will be used or disclosed as a result of you signing this authorization could be re-disclosed by the recipient. If this occurs, your re-disclosed health information may no longer be protected by State or Federal laws.  * You may refuse to sign this Authorization. Your refusal will not affect your ability to obtain treatment.  * This Authorization becomes effective upon signing and will  on (date) __________.      If no date is indicated, this Authorization will  one (1) year from the signature date.    Name: Harley Armstrong  Tacos    Signature:Continuation of Care   Date:     12/16/2020       PLEASE FAX REQUESTED RECORDS BACK TO: [phone]

## 2020-12-16 NOTE — LETTER
[facility]  Sue Preston M.D.  [address]  [place] [address]  Fax: [phone]   Authorization for Release/Disclosure of   Protected Health Information   Name: HARLEY DE LA CRUZ : 1990 SSN: [id number]   Address: [address] [place] [address]  [place] [address] Phone:    [phone] (home)    I authorize the entity listed below to release/disclose the PHI below to:   [facility]/Sue Preston M.D. and Sue Preston M.D.   Provider or Entity Name:[facility] Address City, State, [facility]   Phone:[phone]    Fax:[phone]     Reason for request: continuity of care   Information to be released:    [  ] LAST COLONOSCOPY,  including any PATH REPORT and follow-up  [  ] LAST FIT/COLOGUARD RESULT [  ] LAST DEXA  [  ] LAST MAMMOGRAM  [  ] LAST PAP  [  ] LAST LABS [  ] RETINA EXAM REPORT  [  ] IMMUNIZATION RECORDS  [ xxx ] Release all info      [  ] Check here and initial the line next to each item to release ALL health information INCLUDING  _____ Care and treatment for drug and / or alcohol abuse  _____ HIV testing, infection status, or AIDS  _____ Genetic Testing    DATES OF SERVICE OR TIME PERIOD TO BE DISCLOSED: _____________  I understand and acknowledge that:  * This Authorization may be revoked at any time by you in writing, except if your health information has already been used or disclosed.  * Your health information that will be used or disclosed as a result of you signing this authorization could be re-disclosed by the recipient. If this occurs, your re-disclosed health information may no longer be protected by State or Federal laws.  * You may refuse to sign this Authorization. Your refusal will not affect your ability to obtain treatment.  * This Authorization becomes effective upon signing and will  on (date) __________.      If no date is indicated, this Authorization will  one (1) year from the signature date.    Name: Harley Armstrong  T[place]    Signature:Continuation of Care   Date:     12/16/2020       PLEASE FAX REQUESTED RECORDS BACK TO: [phone]

## 2020-12-16 NOTE — ASSESSMENT & PLAN NOTE
Patient reports she has been on levothyroxine for several years for Hashiomoto's. She reports compliance with levothyroxine 75mcg q morning.

## 2020-12-16 NOTE — ASSESSMENT & PLAN NOTE
The patient reports she was diagnosed with schizophrenia many years ago along with several other conditions including depression, anxiety, disassociative identity disorder. She is establishing care with Dr. La Lutz later today.   She is on geodon 40mg BID, Prozac 40 mg daily, BuSpar 10 mg twice daily as needed, and trazodone 50 mg for sleep.

## 2020-12-16 NOTE — ASSESSMENT & PLAN NOTE
The patient follows with Dr. Yousif, most recent episode requiring hospitalization 10/29/20-11/3/20, tx with high dose steroids; now on daily cellcept. She has an appointment 12/30/20 with Dr. Yousif.

## 2020-12-16 NOTE — ASSESSMENT & PLAN NOTE
"The patient reports possible history of seizure but states \"maybe its Parkinson's disease.\" She is on topiramate 50mg BID and trileptal 150mg BID. She has not seen a neurologist in several months and is not currently established.  "

## 2020-12-16 NOTE — ASSESSMENT & PLAN NOTE
"Patient reports she was diagnosed about a year ago. She is currently lantus 15U at night and trulicity 1.5mg once weekly. She reports history of lactic acidosis with metformin. She reports \"I eat what my family eats, whatever is tossed my direction I'll eat it.\" She reports eating a lot of pasta and burritos. She drinks ginger ale and cherry seven-up. She does try to eat salads.   "

## 2020-12-16 NOTE — ASSESSMENT & PLAN NOTE
This is a chronic problem.  The patient reports she has been on ergocalciferol 50,000 units q. 7 days for a couple months.  She states she was started on this medication by nephrology.

## 2020-12-17 ENCOUNTER — PATIENT OUTREACH (OUTPATIENT)
Dept: HEALTH INFORMATION MANAGEMENT | Facility: OTHER | Age: 30
End: 2020-12-17

## 2020-12-17 ENCOUNTER — TELEMEDICINE (OUTPATIENT)
Dept: MEDICAL GROUP | Facility: MEDICAL CENTER | Age: 30
End: 2020-12-17
Payer: MEDICARE

## 2020-12-17 ENCOUNTER — NURSE TRIAGE (OUTPATIENT)
Dept: HEALTH INFORMATION MANAGEMENT | Facility: OTHER | Age: 30
End: 2020-12-17

## 2020-12-17 VITALS
WEIGHT: 237 LBS | TEMPERATURE: 99 F | HEART RATE: 102 BPM | BODY MASS INDEX: 39.49 KG/M2 | OXYGEN SATURATION: 97 % | HEIGHT: 65 IN

## 2020-12-17 DIAGNOSIS — F99 PSYCHIATRIC DISORDER: ICD-10-CM

## 2020-12-17 DIAGNOSIS — R69 ILLNESS: ICD-10-CM

## 2020-12-17 PROCEDURE — 99213 OFFICE O/P EST LOW 20 MIN: CPT | Mod: 95,CR | Performed by: FAMILY MEDICINE

## 2020-12-17 ASSESSMENT — FIBROSIS 4 INDEX: FIB4 SCORE: 0.37

## 2020-12-17 NOTE — ASSESSMENT & PLAN NOTE
Symptoms first started about 5 days ago  However, severely worse last night  Nausea, vomiting, cough  Chest pain  Chills  Body aches  Does have fibromyaliga  Took a neb treatment  Patient is disabled  Denies SOB

## 2020-12-17 NOTE — PROGRESS NOTES
[facility] AT [facility]  PSYCHIATRIC FOLLOW-UP NOTE    This evaluation was conducted via Zoom, using secure and encrypted videoconferencing technology. The patient was physical located at their home address in [place], [place], and the physician was located at [facility] in [place], [place]. The patient was presented by self, at home. The patient’s identity was confirmed and verbal consent for the telemedicine encounter was obtained.    CC:  Presents for follow up visit for medication evaluation and management        History Of Present Illness:  Kristin Balderrama is a 29 y.o. female, single, lives with her grandmother, with a documented history of schizoaffective disorder, personality disorder, and insomnia, the patient states she has schizophrenia and multiple personality disorder, with significant comorbid medical problems requiring frequent hospitalizations, presents today for follow up.  She is a former patient of  Dr. Burnette, who retired.      The patient reported that she was discharged from the hospital a couple weeks ago due to having a UTI and she became septic.  She says she had a meltdown at the hospital and that they want her to follow-up with her psychiatrist never she was discharged.  She does not remember the episode that her family tells her that she was very paranoid and was delusional believing that there were cameras watching her.  She states she is continued to have paranoid thinking for example that her family is talking about her behind her back or are keeping things from her.  She would like to have a medication that could help her with this.  She has never tried Trileptal before.  She likes the buspirone and would appreciate an increased dose.  She has not been sleeping as well either since her dose of trazodone was reduced to 50 mg and says she did much better at 100 mg.  The patient does have a prolonged QT C and so we cannot increase the  "Geodon any further.  She is at 40 mg twice daily.    History from 1/7/20 visit:  \"The patient reported she is currently at a nursing facility and has been there for 1 week following a hospitalization where she fell and hit her head and she says that her legs have not been working.  She reports that a spinal tap was done and showed that she had elevated intracranial pressure.  She says she is required to be at this facility to do rehabilitation.  She continues to feel extreme weakness in her legs and cannot walk unassisted.  Her roommate plays the TV all day and all night and so the patient says she cannot stay in the room and has difficulty sleeping.  Also she reports that several of her medications have been changed due to \"polypharmacy \"and this has affected how she has been feeling.  For example, her trazodone was reduced from 100 mg a night to 25 mg at bedtime.  However her Prozac was increased from 20 mg to 40 milligrams.  Modafinil was added for excessive daytime sleepiness but the patient states she has not noticed any improvement.  Trileptal 150 mg was added and the patient does believe this is helping her.    The patient endorses hallucinations, visual, seeing shadows and auditory hallucination \"whispers.\"  She says the whispers are like white noise that she cannot tell how many voices or whether they are male or female.  Regarding her multiple personality disorder, the patient says she just switches personality, and that she is unaware of doing so, and so is not sure what other people experience when her personality changes, that they just tell her that her personality has changed.\"      Psychiatric History:  Several hospitalization at [facility] and [facility].  At least one suicide attempt by taking a whole bottle of her Seroquel; however, she says she was not hospitalized for the same.  Saw Dr. Burnette for approximately 6 years  Denies participating very often in individual therapy and states that she " "\"hates\" group therapy.    Family History:  Mother and sister with \"multiple personality disorder \"    Social History:  Homeschooled, obtained high school diploma, was \"in and out of school in special education,\" \"could not socialize so I was homeschooled.\"  She says she was placed in special education because she had problems with some subjects, her best subject with history.  She has worked at fast [employer] and a [employer] but \"no one wants me because of my multiple personalities.\"  She has been on disability since 2007 for mental health and for back problems.     Depression screening:  Depression Screen (PHQ-2/PHQ-9) 9/24/2020 10/11/2020 10/30/2020   PHQ-2 Total Score - - -   PHQ-2 Total Score 0 6 0   PHQ-2 Total Score - - -   PHQ-2 Total Score - - -   PHQ-9 Total Score - - -   PHQ-9 Total Score - 15 -   PHQ-9 Total Score - - -   PHQ-9 Total Score - - -       Interpretation of PHQ-9 Total Score   Score Severity   1-4 No Depression   5-9 Mild Depression   10-14 Moderate Depression   15-19 Moderately Severe Depression   20-27 Severe Depression    Past Medical, Family and Social History reviewed with the patient.    Current medications and allergies reviewed with the patient.    REVIEW OF SYSTEMS:        Constitutional negative   Eyes negative   Ears/Nose/Mouth/Throat negative   Cardiovascular negative   Respiratory negative   Gastrointestinal negative   Genitourinary negative   Muscular negative   Integumentary negative   Neurological negative   Endocrine negative   Hematologic/Lymphatic negative       Physical Examination and Psychiatric Mental Status Examination:  Vital signs: Ht 1.651 m (5' 5\")   Wt 107.5 kg (237 lb)   BMI 39.44 kg/m²     CONSTITUTIONAL:  General Appearance:  Clean, casual attire, good eye contact, engaged with provider    MUSCULOSKELETAL:  Muscle Strength and Tone:  normal, no atrophy, no abnormal movements  Gait and Station:  normal gait, walks without " "assistance    ORIENTATION:  Oriented to time, place and person  RECENT AND REMOTE MEMORY:  Grossly intact  ATTENTION SPAN AND CONCENTRATION:  within normal range  LANGUAGE:  no deficits appreciated  FUND OF KNOWLEDGE:  has awareness of current events, past history and normal vocabulary  SPEECH:  normal volume, amount, rate and articulation, no perseveration or paucity of language  MOOD:   \"Anxious\"  AFFECT:  Congruent with mood  THOUGHT PROCESS:  logical and goal directed  THOUGHT CONTENT:  Denies any SI/HI or AVH, no delusional thinking nor preoccupations appreciated  ASSOCIATIONS:  Intact, not loose, no tangentiality or circumstantiality  MEMORY:  No gross evidence of memory deficits  JUDGMENT:  adequate concerning everyday activities  INSIGHT:  adequate to psychiatric condition        DIAGNOSTIC IMPRESSION:  1. Schizoaffective disorder, unspecified type (HCC)  - REFERRAL FOR INDIVIDUAL THERAPY    2. Dissociative identity disorder ([facility])    3. Schizoaffective disorder, depressive type ([facility])    4. Anxiety    Other orders  - traZODone (DESYREL) 100 MG Tab; Take 1 tablet by mouth at bedtime as needed for insomnia  Dispense: 90 Tab; Refill: 0  - OXcarbazepine (TRILEPTAL) 300 MG Tab; Take 1 tablet by mouth once a day for 7 days, then take 2 tablets by mouth twice a day  Dispense: 180 Tab; Refill: 0  - busPIRone (BUSPAR) 10 MG Tab tablet; Take 1.5 tablets by mouth 3 times a day for anxiety  Dispense: 405 Tab; Refill: 0  - ziprasidone (GEODON) 40 MG Cap; Take 1 tablet by mouth twice a day  Dispense: 180 Cap; Refill: 0  - fluoxetine (PROZAC) 40 MG capsule; Take 1 Cap by mouth every day.  Dispense: 90 Cap; Refill: 0     Asssessment and Plan:  Risk of suicide is assessed to be low.  Will be mindful that she has prolonged QTc and to not increase the dose of geodon further  1. Schizoaffective disorder, depressed type.  2. Personality disorder.  3. Dissociative Identity Disorder - her GM identified 4 other personalities, 1) " "\"The General\" who is aggressive and argumentative, 2) the little child, 3) one that is almost violent, 4) one that wants medical attention and gets her to go to the ED often unnecessarily  Cont fluoxetine 40 mg one a day   Continue Geodon 40 mg bid  Titrate Buspar from 10 mg bid to 15 mg TID  Increase Trileptal from 150 mg to 300 mg QHS x 7 days and then 300 mg BID  Placed referral for individual therapy for her dissociative identity disorder and schizoaffective DO.     3 insomnia.  Increase Trazodone from 50 mg to 100 mg,   Restart Melatonin     4.  Continue f/u of medical problems as directed     5. Follow up in 3 months or call sooner PRN    Risks, benefits, alternatives and side effects were discussed for all medicines prescribed at this visit.  The patient voiced understanding.  The patient agrees to call the clinic with any questions or concerns, or seek emergent medical care if warranted.    The proposed treatment plan was discussed with the patient who was provided the opportunity to ask questions and make suggestions regarding alternative treatment. The patient verbalized understanding and expressed agreement with the plan.    Greater than 16 minutes of the visit was spent in psychotherapy.  (If  16 minutes or greater, add 49147 code)   Psychotherapy include:  Supportive psychotherapy to build rapport and build a therapeutic alliance with the radhatent, this is only her 2nd appt with this MD in 12 months and she saw her former psychiatrist for 6 years.  Obtained additional history from the patient and her grandmother regarding the identity of the patient's different personalities, and her grandmother explained them as follows:  1) \"The General\" who is aggressive and argumentative, 2) the little child, 3) one that is almost violent, 4) one that wants medical attention and gets her to go to the ED often unnecessarily.  Psychoeducation regarding the benefits of regularly scheduled therapy appts to help the " patient integrate all of these personalities in 2 one and to help her find other ways to manage her anxiety and mood changes without getting aggressive.  She is more receptive this time and agreed to allow this MD to place a referral.    La Lutz M.D.      This note was created using voice recognition software (Dragon). The accuracy of the dictation is limited by the abilities of the software. I have reviewed the note prior to signing, however some errors in grammar and context are still possible. If you have any questions related to this note please do not hesitate to contact our office.

## 2020-12-17 NOTE — TELEPHONE ENCOUNTER
Regarding: BLOOD COUGH  ----- Message from Danae Reyes sent at 12/17/2020 12:14 PM PST -----    COUGH/BLOOD/LUNG HURTS

## 2020-12-17 NOTE — PROGRESS NOTES
Received a referral and did a patient outreach call to follow up.  After describing our program patient very interested.  Verified ID and Verbal consent obtained, patient wished to enroll in CCM for assistance with organizing multiple MD appointments and multiple prescriptions.  Patient stated that she has a hard time remembering appointments and when she misses them she uses the ED as back up. Patient has had >25 ED visits from [facility] alone in the past 12 months. She also visits other EDs in the area.  Date and time was set up for an intake call. 12/22 at 1000.  Will follow up at this time.

## 2020-12-17 NOTE — TELEPHONE ENCOUNTER
1. Caller Name: Kristin Balderrama                 Call Back Number: [phone]   [facility] PCP or Specialty Provider: Yes Sue Preston        2.  Has the patient previously tested positive for COVID-19? No    3.  In the last two weeks, has the patient had any new or worsening symptoms (not explained by alternative diagnosis)? Yes, the patient reports the following COVID-19 consistent symptoms: cough, chills, sore throat, congestion or runny nose and vomiting.    4.  Does patient have any comoribidities? COPD    5.  Has the patient had any known contact with someone who is suspected or confirmed to have COVID-19? No.    5. Disposition: Offered patient virtual visit to limit potential exposure to others; patient also given self care instructions    Note routed to [facility] Provider: RIGOBERTO only.       Reason for Disposition  • Coughing up moises-colored (reddish-brown) or blood-tinged sputum    Additional Information  • Negative: Bluish (or gray) lips or face  • Negative: SEVERE difficulty breathing (e.g., struggling for each breath, speaks in single words)  • Negative: Rapid onset of cough and has hives  • Negative: Coughing started suddenly after medicine, an allergic food or bee sting  • Negative: Difficulty breathing after exposure to flames, smoke, or fumes  • Negative: Sounds like a life-threatening emergency to the triager  • Negative: Previous asthma attacks and this feels like asthma attack  • Negative: Chest pain present when not coughing  • Negative: Difficulty breathing  • Negative: Passed out (i.e., fainted, collapsed and was not responding)  • Negative: Patient sounds very sick or weak to the triager  • Negative: Coughed up > 1 tablespoon (15 ml) blood (Exception: blood-tinged sputum)  • Negative: Fever > 103 F (39.4 C)  • Negative: Fever > 101 F (38.3 C) and over 60 years of age  • Negative: Fever > 100.0 F (37.8 C) and has diabetes mellitus or a weak immune system (e.g., HIV positive, cancer chemotherapy,  "organ transplant, splenectomy, chronic steroids)  • Negative: Fever > 100.0 F (37.8 C) and bedridden (e.g., nursing home patient, stroke, chronic illness, recovering from surgery)  • Negative: Increasing ankle swelling  • Negative: Wheezing is present    Answer Assessment - Initial Assessment Questions  1. ONSET: \"When did the cough begin?\"       yesterday  2. SEVERITY: \"How bad is the cough today?\"       moderate  3. RESPIRATORY DISTRESS: \"Describe your breathing.\"       no  4. FEVER: \"Do you have a fever?\" If so, ask: \"What is your temperature, how was it measured, and when did it start?\"      no  5. HEMOPTYSIS: \"Are you coughing up any blood?\" If so ask: \"How much?\" (flecks, streaks, tablespoons, etc.)      Coughed once, with blood streaked mucous, small amt bright red blood  6. TREATMENT: \"What have you done so far to treat the cough?\" (e.g., meds, fluids, humidifier)      Taking mucinex, trying to drink more water  7. CARDIAC HISTORY: \"Do you have any history of heart disease?\" (e.g., heart attack, congestive heart failure)       Irregular rhytthm  8. LUNG HISTORY: \"Do you have any history of lung disease?\"  (e.g., pulmonary embolus, asthma, emphysema)      Asthma, copd,   9. PE RISK FACTORS: \"Do you have a history of blood clots?\" (or: recent major surgery, recent prolonged travel, bedridden)      Fairly bedridden  10. OTHER SYMPTOMS: \"Do you have any other symptoms? (e.g., runny nose, wheezing, chest pain)        Nasal congestion and runny nose. Slight sore throat from post nasal gtt.   11. PREGNANCY: \"Is there any chance you are pregnant?\" \"When was your last menstrual period?\"        No. Does not have periods x 2 years  12. TRAVEL: \"Have you traveled out of the country in the last month?\" (e.g., travel history, exposures)        No    Protocols used: COUGH-A-OH    "

## 2020-12-18 NOTE — PROGRESS NOTES
"Subjective:     CC: The encounter diagnosis was Illness.    HPI: Patient is a 30 y.o. female established patient who presents today with concerns regarding illness.      Illness  New problem.  The patient states that she started having mild symptoms around 5 days ago but since last night she reports that she has been having nausea, vomiting, cough, chest pain, chills, body aches.  She has been using nebulizers for shortness of breath.  Of note, she does not appear out of breath, no coughing during exam whatsoever.  The patient has a long history of recurrent ER visits, more than 25 times in the past year.  She also has been seen at [facility] in [place] ER's.  She has been advised that if she has any more Covid test she will be forced to pay for them out of pocket.  Of note, she has a past medical history of schizophrenia, seizure, type 2 diabetes, SVT, PTSD, transverse myelitis, idiopathic intracranial hypertension, borderline personality disorder, morbid obesity.  She established in our office yesterday with Dr. Preston.        Past Medical History:   Diagnosis Date   • Abdominal pain    • Anginal syndrome     random chest pain especially with tachycardia   • Apnea, sleep    • Arrhythmia     \"sinus tachycardia\", cariologist, Dr. Kumar; ablation 2/2016   • Arthritis     osteo   • ASTHMA     when around smoke   • Atrial fibrillation (HCC)    • Back pain    • Borderline personality disorder (HCC)    • Breath shortness     with tachycardia   • Bronchitis    • Cardiac arrhythmia    • Chickenpox    • Chronic UTI 9/18/2010   • Cough    • Daytime sleepiness    • Depression    • Diabetes (HCC)    • Diarrhea    • Disorder of thyroid    • Fall    • Fatigue    • Frequent headaches    • Gasping for breath    • Gynecological disorder     PCOS   • Headache(784.0)    • Heart burn    • History of falling    • Hypertension    • Indigestion    • Migraine    • 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HCC)    • Multiple personality disorder " "([facility])    • Nausea    • Obesity    • Other fatigue 6/29/2020   • Pain 08-15-12    back, 7/10   • Painful joint    • PCOS (polycystic ovarian syndrome)    • Pneumonia 2012   • Psychosis ([facility])    • Renal disorder     \"kidney disease, stage 1\" nephrologist, Dr. Vallejo   • Ringing in ears    • Scoliosis    • Shortness of breath    • Sinus tachycardia 10/31/2013   • Sleep apnea     CPAP \"pulmonary doctor took me off mid year 2016\"   • Snoring    • Tonsillitis    • 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[facility])    • Tuberculosis     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   • Urinary bladder disorder     Suprapubic cath   • Urinary incontinence    • Weakness    • Wears glasses        Social History     Tobacco Use   • Smoking status: Never Smoker   • Smokeless tobacco: Never Used   Substance Use Topics   • Alcohol use: No     Alcohol/week: 0.0 oz     Frequency: Never     Binge frequency: Never   • Drug use: Not Currently     Frequency: 7.0 times per week     Types: Marijuana       Current Outpatient Medications Ordered in Epic   Medication Sig Dispense Refill   • ipratropium-albuterol (DUONEB) 0.5-2.5 (3) MG/3ML nebulizer solution Take 3 mL by nebulization 4 times a day. Nebulizer     • Dulaglutide (TRULICITY) 3 MG/0.5ML Solution Pen-injector Inject 3 mg under the skin every 7 days. 0.5 mL 6   • traZODone (DESYREL) 100 MG Tab Take 1 tablet by mouth at bedtime as needed for insomnia 90 Tab 0   • OXcarbazepine (TRILEPTAL) 300 MG Tab Take 1 tablet by mouth once a day for 7 days, then take 2 tablets by mouth twice a day 180 Tab 0   • busPIRone (BUSPAR) 10 MG Tab tablet Take 1.5 tablets by mouth 3 times a day for anxiety 405 Tab 0   • ziprasidone (GEODON) 40 MG Cap Take 1 tablet by mouth twice a day 180 Cap 0   • fluoxetine (PROZAC) 40 MG capsule Take 1 Cap by mouth every day. 90 Cap 0   • mycophenolate (CELLCEPT) 500 MG tablet Take 2 Tabs by mouth 2 times a day. (Patient taking differently: Take 500 mg by mouth 2 times a day.) 60 Tab 2   • " methocarbamol (ROBAXIN) 750 MG Tab Take 750 mg by mouth as needed. Indications: Musculoskeletal Pain     • topiramate (TOPAMAX) 25 MG Tab Take 50 mg by mouth 2 times a day.     • OXcarbazepine (TRILEPTAL) 150 MG Tab TAKE ONE TABLET BY MOUTH TWICE DAILY 60 Tab 0   • calcium carbonate (TUMS EX) 750 MG chewable tablet Take 2 Tabs by mouth every day. (Patient taking differently: Take 1,500 mg by mouth as needed. Indications: Heartburn, Sour Stomach) 60 Tab 0   • vitamin D, Ergocalciferol, (DRISDOL) 1.25 MG (33767 UT) Cap capsule Take 50,000 Units by mouth every 7 days. On Wed     • insulin glargine (LANTUS) 100 UNIT/ML Solution Inject 8 Units as instructed every evening. (Patient taking differently: Inject 15 Units under the skin every evening.) 10 mL 0   • levothyroxine (SYNTHROID) 75 MCG Tab Take 1 Tab by mouth Every morning on an empty stomach. 30 Tab 0   • traZODone (DESYREL) 100 MG Tab Take 1 Tab by mouth every bedtime. (Patient taking differently: Take 50 mg by mouth every evening.) 30 Tab 3   • omeprazole (PRILOSEC) 20 MG delayed-release capsule Take 1 Cap by mouth every day. 30 Cap 0   • oxybutynin (DITROPAN) 5 MG Tab Take 1 Tab by mouth 3 times a day. 90 Tab 0   • acetaZOLAMIDE SR (DIAMOX) 500 MG CAPSULE SR 12 HR Take 1,000-1,500 mg by mouth 2 times a day. 1500mg in AM  1000mg at PM     • ivabradine (CORLANOR) 7.5 MG Tab tablet Take 7.5 mg by mouth 2 times a day, with meals.     • ondansetron (ZOFRAN ODT) 4 MG TABLET DISPERSIBLE Take 4 mg by mouth every 6 hours as needed for Nausea.     • gabapentin (NEURONTIN) 300 MG Cap Take 300 mg by mouth every evening.     • Mirabegron ER (MYRBETRIQ) 50 MG TABLET SR 24 HR Take 50 mg by mouth every day.     • sodium bicarbonate (SODIUM BICARBONATE) 650 MG Tab Take 325 mg by mouth 2 times a day.     • aspirin EC (ECOTRIN) 81 MG Tablet Delayed Response Take 81 mg by mouth every day.     • potassium chloride SA (KDUR) 20 MEQ Tab CR Take 40 mEq by mouth 2 times a day.     •  "albuterol 108 (90 Base) MCG/ACT Aero Soln inhalation aerosol Inhale 2 Puffs by mouth every 6 hours as needed for Shortness of Breath.       No current [facility]-ordered facility-administered medications on file.        Allergies:  Depakote [divalproex sodium], Amitriptyline, Aripiprazole [abilify], Clindamycin, Flagyl [metronidazole hcl], Flomax [tamsulosin hydrochloride], Levaquin, Metformin, Tape, Vancomycin, Wound dressing adhesive, Ciprofloxacin, Hydromorphone hcl, Keflex, Levofloxacin, Metronidazole, Penicillins, Tamsulosin, and Valproic acid    Health Maintenance: Completed    ROS:  See HPI      Objective:       Exam:  Pulse (!) 102   Temp 37.2 °C (99 °F) (Temporal)   Ht 1.651 m (5' 5\")   Wt 107.5 kg (237 lb)   SpO2 97%   BMI 39.44 kg/m²  Body mass index is 39.44 kg/m².    General: Normal appearing. No distress.  Orbitally obese.  HEAD: NCAT  EYES: conjunctiva clear, lids without ptosis, pupils equal and reactive to light  EARS: ears normal shape and contour.  MOUTH: normal dentition   Neck:  Normal ROM  Pulmonary: Normal effort. Normal respiratory rate.  Cardiovascular: Well perfused.  Neurologic: Grossly normal, no focal deficits  Skin: Warm and dry.  No obvious lesions.  Musculoskeletal: Normal gait and station.   Psych: Normal mood and affect. Alert and oriented x3. Judgment and insight is normal.      Labs: 11/9/2020 results reviewed and discussed with the patient, questions answered.    Assessment & Plan:     30 y.o. female with the following -     1. Illness  New problem.  The patient is reporting flulike symptoms including cough, chest pain, shortness of breath, fever, chills, body aches, fatigue.  However, she has been seen dozens of times in the [facility] ER as well as in the [place] [facility] ERs for similar complaints.  Covid tests have been negative thus far.  She is disabled and states she does not really leave the house.  She appears well on exam today.  I did offer a repeat Covid test " as the symptoms are new.  She has a follow-up appointment scheduled with her usual PCP next month.  Advised to please seek urgent care if necessary.  - COVID/SARS COV-2 PCR; Future      Return if symptoms worsen or fail to improve.    Please note that this dictation was created using voice recognition software. I have made every reasonable attempt to correct obvious errors, but I expect that there are errors of grammar and possibly content that I did not discover before finalizing the note.

## 2020-12-19 ENCOUNTER — HOSPITAL ENCOUNTER (OUTPATIENT)
Dept: LAB | Facility: MEDICAL CENTER | Age: 30
End: 2020-12-19
Attending: NURSE PRACTITIONER
Payer: MEDICARE

## 2020-12-19 PROCEDURE — U0003 INFECTIOUS AGENT DETECTION BY NUCLEIC ACID (DNA OR RNA); SEVERE ACUTE RESPIRATORY SYNDROME CORONAVIRUS 2 (SARS-COV-2) (CORONAVIRUS DISEASE [COVID-19]), AMPLIFIED PROBE TECHNIQUE, MAKING USE OF HIGH THROUGHPUT TECHNOLOGIES AS DESCRIBED BY CMS-2020-01-R: HCPCS

## 2020-12-20 LAB
COVID ORDER STATUS COVID19: NORMAL
SARS-COV-2 RNA RESP QL NAA+PROBE: NOTDETECTED
SPECIMEN SOURCE: NORMAL

## 2020-12-21 ENCOUNTER — HOSPITAL ENCOUNTER (EMERGENCY)
Facility: MEDICAL CENTER | Age: 30
End: 2020-12-21
Attending: EMERGENCY MEDICINE
Payer: MEDICARE

## 2020-12-21 VITALS
BODY MASS INDEX: 40.7 KG/M2 | RESPIRATION RATE: 16 BRPM | HEIGHT: 65 IN | SYSTOLIC BLOOD PRESSURE: 120 MMHG | HEART RATE: 83 BPM | OXYGEN SATURATION: 96 % | TEMPERATURE: 97.9 F | DIASTOLIC BLOOD PRESSURE: 79 MMHG | WEIGHT: 244.27 LBS

## 2020-12-21 DIAGNOSIS — N30.90 CYSTITIS: ICD-10-CM

## 2020-12-21 DIAGNOSIS — T83.010A SUPRAPUBIC CATHETER DYSFUNCTION, INITIAL ENCOUNTER (HCC): ICD-10-CM

## 2020-12-21 LAB
ALBUMIN SERPL BCP-MCNC: 4.1 G/DL (ref 3.2–4.9)
ALBUMIN/GLOB SERPL: 2.1 G/DL
ALP SERPL-CCNC: 73 U/L (ref 30–99)
ALT SERPL-CCNC: 32 U/L (ref 2–50)
ANION GAP SERPL CALC-SCNC: 9 MMOL/L (ref 7–16)
APPEARANCE UR: ABNORMAL
AST SERPL-CCNC: 19 U/L (ref 12–45)
BACTERIA #/AREA URNS HPF: ABNORMAL /HPF
BASOPHILS # BLD AUTO: 1 % (ref 0–1.8)
BASOPHILS # BLD: 0.06 K/UL (ref 0–0.12)
BILIRUB SERPL-MCNC: 0.3 MG/DL (ref 0.1–1.5)
BILIRUB UR QL STRIP.AUTO: NEGATIVE
BUN SERPL-MCNC: 11 MG/DL (ref 8–22)
CALCIUM SERPL-MCNC: 9.3 MG/DL (ref 8.4–10.2)
CAOX CRY #/AREA URNS HPF: ABNORMAL /HPF
CHLORIDE SERPL-SCNC: 111 MMOL/L (ref 96–112)
CO2 SERPL-SCNC: 18 MMOL/L (ref 20–33)
COLOR UR: YELLOW
CREAT SERPL-MCNC: 0.67 MG/DL (ref 0.5–1.4)
EOSINOPHIL # BLD AUTO: 0.17 K/UL (ref 0–0.51)
EOSINOPHIL NFR BLD: 2.7 % (ref 0–6.9)
EPI CELLS #/AREA URNS HPF: ABNORMAL /HPF
ERYTHROCYTE [DISTWIDTH] IN BLOOD BY AUTOMATED COUNT: 49.3 FL (ref 35.9–50)
GLOBULIN SER CALC-MCNC: 2 G/DL (ref 1.9–3.5)
GLUCOSE SERPL-MCNC: 102 MG/DL (ref 65–99)
GLUCOSE UR STRIP.AUTO-MCNC: NEGATIVE MG/DL
HCT VFR BLD AUTO: 38.4 % (ref 37–47)
HGB BLD-MCNC: 12.1 G/DL (ref 12–16)
HYALINE CASTS #/AREA URNS LPF: ABNORMAL /LPF
IMM GRANULOCYTES # BLD AUTO: 0.06 K/UL (ref 0–0.11)
IMM GRANULOCYTES NFR BLD AUTO: 1 % (ref 0–0.9)
KETONES UR STRIP.AUTO-MCNC: NEGATIVE MG/DL
LEUKOCYTE ESTERASE UR QL STRIP.AUTO: ABNORMAL
LYMPHOCYTES # BLD AUTO: 1.61 K/UL (ref 1–4.8)
LYMPHOCYTES NFR BLD: 25.8 % (ref 22–41)
MCH RBC QN AUTO: 28.5 PG (ref 27–33)
MCHC RBC AUTO-ENTMCNC: 31.5 G/DL (ref 33.6–35)
MCV RBC AUTO: 90.4 FL (ref 81.4–97.8)
MICRO URNS: ABNORMAL
MONOCYTES # BLD AUTO: 0.54 K/UL (ref 0–0.85)
MONOCYTES NFR BLD AUTO: 8.7 % (ref 0–13.4)
NEUTROPHILS # BLD AUTO: 3.8 K/UL (ref 2–7.15)
NEUTROPHILS NFR BLD: 60.8 % (ref 44–72)
NITRITE UR QL STRIP.AUTO: POSITIVE
NRBC # BLD AUTO: 0 K/UL
NRBC BLD-RTO: 0 /100 WBC
PH UR STRIP.AUTO: 5.5 [PH] (ref 5–8)
PLATELET # BLD AUTO: 170 K/UL (ref 164–446)
PMV BLD AUTO: 11.4 FL (ref 9–12.9)
POTASSIUM SERPL-SCNC: 3.5 MMOL/L (ref 3.6–5.5)
PROT SERPL-MCNC: 6.1 G/DL (ref 6–8.2)
PROT UR QL STRIP: 100 MG/DL
RBC # BLD AUTO: 4.25 M/UL (ref 4.2–5.4)
RBC # URNS HPF: ABNORMAL /HPF
RBC UR QL AUTO: ABNORMAL
SODIUM SERPL-SCNC: 138 MMOL/L (ref 135–145)
SP GR UR REFRACTOMETRY: 1.02
WBC # BLD AUTO: 6.2 K/UL (ref 4.8–10.8)
WBC #/AREA URNS HPF: ABNORMAL /HPF

## 2020-12-21 PROCEDURE — 87086 URINE CULTURE/COLONY COUNT: CPT

## 2020-12-21 PROCEDURE — 700111 HCHG RX REV CODE 636 W/ 250 OVERRIDE (IP): Performed by: EMERGENCY MEDICINE

## 2020-12-21 PROCEDURE — A9270 NON-COVERED ITEM OR SERVICE: HCPCS | Performed by: EMERGENCY MEDICINE

## 2020-12-21 PROCEDURE — 80053 COMPREHEN METABOLIC PANEL: CPT

## 2020-12-21 PROCEDURE — 700102 HCHG RX REV CODE 250 W/ 637 OVERRIDE(OP): Performed by: EMERGENCY MEDICINE

## 2020-12-21 PROCEDURE — 81001 URINALYSIS AUTO W/SCOPE: CPT

## 2020-12-21 PROCEDURE — 85025 COMPLETE CBC W/AUTO DIFF WBC: CPT

## 2020-12-21 PROCEDURE — 99284 EMERGENCY DEPT VISIT MOD MDM: CPT

## 2020-12-21 PROCEDURE — 96374 THER/PROPH/DIAG INJ IV PUSH: CPT | Mod: XU

## 2020-12-21 PROCEDURE — 96375 TX/PRO/DX INJ NEW DRUG ADDON: CPT

## 2020-12-21 RX ORDER — SULFAMETHOXAZOLE AND TRIMETHOPRIM 800; 160 MG/1; MG/1
1 TABLET ORAL ONCE
Status: COMPLETED | OUTPATIENT
Start: 2020-12-21 | End: 2020-12-21

## 2020-12-21 RX ORDER — ONDANSETRON 4 MG/1
4 TABLET, ORALLY DISINTEGRATING ORAL EVERY 6 HOURS PRN
Qty: 10 TAB | Refills: 0 | Status: SHIPPED
Start: 2020-12-21 | End: 2021-04-15

## 2020-12-21 RX ORDER — SULFAMETHOXAZOLE AND TRIMETHOPRIM 800; 160 MG/1; MG/1
1 TABLET ORAL 2 TIMES DAILY
Qty: 20 TAB | Refills: 0 | Status: ON HOLD
Start: 2020-12-21 | End: 2020-12-30

## 2020-12-21 RX ORDER — ONDANSETRON 4 MG/1
4 TABLET, ORALLY DISINTEGRATING ORAL ONCE
Status: COMPLETED | OUTPATIENT
Start: 2020-12-21 | End: 2020-12-21

## 2020-12-21 RX ORDER — ACETAMINOPHEN 325 MG/1
650 TABLET ORAL ONCE
Status: COMPLETED | OUTPATIENT
Start: 2020-12-21 | End: 2020-12-21

## 2020-12-21 RX ORDER — CEFDINIR 300 MG/1
300 CAPSULE ORAL ONCE
Status: DISCONTINUED | OUTPATIENT
Start: 2020-12-21 | End: 2020-12-21

## 2020-12-21 RX ADMIN — SULFAMETHOXAZOLE AND TRIMETHOPRIM 1 TABLET: 800; 160 TABLET ORAL at 20:36

## 2020-12-21 RX ADMIN — SULFAMETHOXAZOLE AND TRIMETHOPRIM 1 TABLET: 800; 160 TABLET ORAL at 21:31

## 2020-12-21 RX ADMIN — ACETAMINOPHEN 650 MG: 325 TABLET, FILM COATED ORAL at 19:26

## 2020-12-21 RX ADMIN — ONDANSETRON 4 MG: 4 TABLET, ORALLY DISINTEGRATING ORAL at 21:31

## 2020-12-21 ASSESSMENT — FIBROSIS 4 INDEX: FIB4 SCORE: 0.37

## 2020-12-22 ENCOUNTER — TELEPHONE (OUTPATIENT)
Dept: HEALTH INFORMATION MANAGEMENT | Facility: OTHER | Age: 30
End: 2020-12-22

## 2020-12-22 ENCOUNTER — PATIENT OUTREACH (OUTPATIENT)
Dept: HEALTH INFORMATION MANAGEMENT | Facility: OTHER | Age: 30
End: 2020-12-22

## 2020-12-22 ENCOUNTER — NURSE TRIAGE (OUTPATIENT)
Dept: HEALTH INFORMATION MANAGEMENT | Facility: OTHER | Age: 30
End: 2020-12-22

## 2020-12-22 ENCOUNTER — HOSPITAL ENCOUNTER (EMERGENCY)
Facility: MEDICAL CENTER | Age: 30
End: 2020-12-22
Attending: EMERGENCY MEDICINE
Payer: MEDICARE

## 2020-12-22 VITALS
WEIGHT: 244 LBS | HEART RATE: 82 BPM | OXYGEN SATURATION: 96 % | TEMPERATURE: 97.5 F | HEIGHT: 65 IN | BODY MASS INDEX: 40.65 KG/M2 | RESPIRATION RATE: 20 BRPM | DIASTOLIC BLOOD PRESSURE: 68 MMHG | SYSTOLIC BLOOD PRESSURE: 127 MMHG

## 2020-12-22 DIAGNOSIS — N30.00 ACUTE CYSTITIS WITHOUT HEMATURIA: ICD-10-CM

## 2020-12-22 DIAGNOSIS — E66.9 DIABETES MELLITUS TYPE 2 IN OBESE: ICD-10-CM

## 2020-12-22 DIAGNOSIS — R10.84 GENERALIZED ABDOMINAL PAIN: ICD-10-CM

## 2020-12-22 DIAGNOSIS — Z93.59 PRESENCE OF SUPRAPUBIC CATHETER (HCC): ICD-10-CM

## 2020-12-22 DIAGNOSIS — K76.0 FATTY LIVER DISEASE, NONALCOHOLIC: ICD-10-CM

## 2020-12-22 DIAGNOSIS — K59.00 CONSTIPATION, UNSPECIFIED CONSTIPATION TYPE: ICD-10-CM

## 2020-12-22 DIAGNOSIS — Z93.59 SUPRAPUBIC CATHETER (HCC): ICD-10-CM

## 2020-12-22 DIAGNOSIS — E11.69 DIABETES MELLITUS TYPE 2 IN OBESE: ICD-10-CM

## 2020-12-22 PROCEDURE — 700102 HCHG RX REV CODE 250 W/ 637 OVERRIDE(OP): Performed by: EMERGENCY MEDICINE

## 2020-12-22 PROCEDURE — A9270 NON-COVERED ITEM OR SERVICE: HCPCS | Performed by: EMERGENCY MEDICINE

## 2020-12-22 PROCEDURE — 99284 EMERGENCY DEPT VISIT MOD MDM: CPT

## 2020-12-22 RX ORDER — OXYCODONE HYDROCHLORIDE AND ACETAMINOPHEN 5; 325 MG/1; MG/1
2 TABLET ORAL ONCE
Status: COMPLETED | OUTPATIENT
Start: 2020-12-22 | End: 2020-12-22

## 2020-12-22 RX ORDER — TOPIRAMATE 50 MG/1
50 TABLET, FILM COATED ORAL 2 TIMES DAILY
Status: SHIPPED | COMMUNITY
End: 2021-05-03

## 2020-12-22 RX ORDER — DULAGLUTIDE 3 MG/.5ML
3 INJECTION, SOLUTION SUBCUTANEOUS
Qty: 0.5 ML | Refills: 6 | Status: ON HOLD | OUTPATIENT
Start: 2020-12-22 | End: 2020-12-30 | Stop reason: SDUPTHER

## 2020-12-22 RX ORDER — SODIUM BICARBONATE 325 MG/1
650 TABLET ORAL 2 TIMES DAILY
Status: SHIPPED | COMMUNITY
End: 2021-05-03

## 2020-12-22 RX ORDER — MONTELUKAST SODIUM 10 MG/1
10 TABLET ORAL EVERY EVENING
Status: SHIPPED | COMMUNITY
End: 2020-12-22

## 2020-12-22 RX ORDER — MIRABEGRON 50 MG/1
50 TABLET, FILM COATED, EXTENDED RELEASE ORAL DAILY
Qty: 90 TAB | Refills: 3 | Status: ON HOLD | OUTPATIENT
Start: 2020-12-22 | End: 2021-04-28

## 2020-12-22 RX ORDER — ACETAMINOPHEN 500 MG
1000 TABLET ORAL EVERY 6 HOURS PRN
Status: SHIPPED | COMMUNITY
End: 2021-04-15

## 2020-12-22 RX ORDER — LEVOTHYROXINE SODIUM 0.07 MG/1
75 TABLET ORAL
Qty: 90 TAB | Refills: 0 | Status: ON HOLD
Start: 2020-12-22 | End: 2021-01-22

## 2020-12-22 RX ORDER — ALBUTEROL SULFATE 90 UG/1
2 AEROSOL, METERED RESPIRATORY (INHALATION) EVERY 6 HOURS PRN
Qty: 8.5 G | Refills: 6 | Status: ON HOLD | OUTPATIENT
Start: 2020-12-22 | End: 2023-07-21

## 2020-12-22 RX ORDER — POLYETHYLENE GLYCOL 3350 17 G/17G
17 POWDER, FOR SOLUTION ORAL 2 TIMES DAILY
Qty: 255 G | Refills: 0 | Status: ON HOLD | OUTPATIENT
Start: 2020-12-22 | End: 2021-01-22

## 2020-12-22 RX ORDER — LEVOTHYROXINE SODIUM 0.1 MG/1
100 TABLET ORAL
Status: SHIPPED | COMMUNITY
End: 2020-12-22

## 2020-12-22 RX ADMIN — OXYCODONE HYDROCHLORIDE AND ACETAMINOPHEN 2 TABLET: 5; 325 TABLET ORAL at 16:06

## 2020-12-22 ASSESSMENT — FIBROSIS 4 INDEX: FIB4 SCORE: 0.61

## 2020-12-22 ASSESSMENT — PAIN DESCRIPTION - PAIN TYPE: TYPE: ACUTE PAIN

## 2020-12-22 NOTE — DISCHARGE INSTRUCTIONS
The correct tubing you will need should be at [facility] where you had your IR procedure.  Please call the radiology department tomorrow and see if they have stocked the nephrostomy or suprapubic catheter tubing with a Luer-Kathi.  If they do have it then pick it up and have the urologist correctly connected the tubing.  In the interim follow-up with your urologist tomorrow.  He also findings consistent with possible infection, will start you on Bactrim, if Dr. Rosenbaum would like to stop this that is fine as you may simply be colonized.

## 2020-12-22 NOTE — TELEPHONE ENCOUNTER
"Pt states was seen in ed yesterday for UTI and new catheter was placed. Today is having severe abd pain radiating to back , upper pubic area feels \"full\" and pt states she can palpate a bulge. UOP for 4.5 hrs is 250mls. 0.51cc/kg/hr. Urine dark bunny in color with visible blood. Pt states feels weak, no fever but positive for chills and sweats. Instructed to return to ER. Will write pcp to request referral for HH.    Reason for Disposition  • SEVERE abdominal pain    Additional Information  • Negative: Shock suspected (e.g., cold/pale/clammy skin, too weak to stand, low BP, rapid pulse)  • Negative: Sounds like a life-threatening emergency to the triager  • Negative: Catheter was accidentally pulled-out and bright red continuous bleeding    Answer Assessment - Initial Assessment Questions  1. SYMPTOMS: \"What symptoms are you concerned about?\"      Pain  2. ONSET:  \"When did the symptoms start?\"      12/15  3. FEVER: \"Is there a fever?\" If so, ask: \"What is the temperature, how was it measured, and when did it start?\"      No but chills  4. ABDOMINAL PAIN: \"Is there any abdominal pain?\" (e.g., Scale 1-10; or mild, moderate, severe)      9  5. URINE COLOR: \"What color is the urine?\"  \"Is there blood present in the urine?\" (e.g., clear, yellow, cloudy, tea-colored, blood streaks, bright red)      Dark , blood streaks,  6. ONSET: \"When was the catheter inserted?\"      12/20  7. OTHER SYMPTOMS: \"Do you have any other symptoms?\" (e.g., back pain, bad urine odor)       Back pain, urine odor  8. PREGNANCY: \"Is there any chance you are pregnant?\" \"When was your last menstrual period?\"      k    Protocols used: URINARY CATHETER SYMPTOMS AND [id number]      "

## 2020-12-22 NOTE — ED NOTES
Pt has supra pubic catheter in place. Dressing is c/d/i. Pt has narrow tubing that connects to a standard drainage bag, urine is cloudy and malodorous.

## 2020-12-22 NOTE — TELEPHONE ENCOUNTER
Call to patient for 2pm appointment, Patient stated she was on her way to the ER because of trouble with her catheter not draining and was unable to talk at this time.  Will re-schedule appointment

## 2020-12-22 NOTE — ED NOTES
Changed IV tubing from catheter for draining and UA. No urine drained. Attempted to spitae urine from tubing, with no success. ERP made aware. Plan to change out catheter.

## 2020-12-22 NOTE — TELEPHONE ENCOUNTER
Received request via: Pharmacy    Was the patient seen in the last year in this department? Yes    Does the patient have an active prescription (recently filled or refills available) for medication(s) requested? No    Pharmacy is requesting 90 days supply for these meds. [facility] is new preferred pharmacy for pt per insurance

## 2020-12-22 NOTE — ED NOTES
Discharge instructions provided.  Pt verbalized the understanding of discharge instructions to follow up with Urology and to return to ER if condition worsens.  Pt ambulated out of ER without difficulty.

## 2020-12-22 NOTE — ED NOTES
Med rec updated and complete  Allergies reviewed  Pt reports that she takes MIRABEGRON ER 50MG 1 tablet daily and OXYBUTYNIN 5MG 1 tablet 3 times a day.  Pt reports that she is not taking MONTELUKAST 10MG

## 2020-12-22 NOTE — ED PROVIDER NOTES
"ED Provider Note    CHIEF COMPLAINT  Chief Complaint   Patient presents with   • Urinary Catheter Problem     Has supra pubic cath with \" lorenzo riged tubing \" Catheter not draining today       HPI  Kristin Balderrama is a 30 y.o. female who presents to the emergency department for problem with urinary catheter.  Patient has a history of optic neuritis on CellCept and mycophenolate, she has a history of suprapubic catheter for incontinence and chronic UTIs.  Patient has been seen multiple times for problems with her urinary catheter.  Patient follows with Dr. Rosenbaum of urology.  Patient had a subacute pubic catheter replaced [facility] via IR, apparently the do not have the connection for the leg bag to her 14 [place] suprapubic catheter therefore connected to IV tubing that is very long and runs to a drainage bag which patient carries around in a shopping bag.  She reports that the IV tubing so long has become twisted and knotted and is now no longer draining.  She reports that since this time she is developed some flank pain and some nausea without episodes of vomiting.  Patient's denies any fevers.    REVIEW OF SYSTEMS  ROS  See HPI for further details. All other systems are negative.     PAST MEDICAL HISTORY   has a past medical history of Abdominal pain, Anginal syndrome, Apnea, sleep, Arrhythmia, Arthritis, ASTHMA, Atrial fibrillation (HCC), Back pain, Borderline personality disorder (HCC), Breath shortness, Bronchitis, Cardiac arrhythmia, Chickenpox, Chronic UTI (9/18/2010), Cough, Daytime sleepiness, Depression, Diabetes (HCC), Diarrhea, Disorder of thyroid, Fall, Fatigue, Frequent headaches, Gasping for breath, Gynecological disorder, Headache(784.0), Heart burn, History of falling, Hypertension, Indigestion, Migraine, 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HCC), 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HCC), Nausea, Obesity, Other fatigue (6/29/2020), Pain (08-15-12), Painful joint, PCOS (polycystic ovarian syndrome), " "Pneumonia (2012), Psychosis (HCC), Renal disorder, Ringing in ears, Scoliosis, Shortness of breath, Sinus tachycardia (10/31/2013), Sleep apnea, Snoring, Tonsillitis, 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HCC), Tuberculosis, Urinary bladder disorder, Urinary incontinence, Weakness, and Wears glasses.    SOCIAL HISTORY  Social History     Tobacco Use   • Smoking status: Never Smoker   • Smokeless tobacco: Never Used   Substance and Sexual Activity   • Alcohol use: No     Alcohol/week: 0.0 oz     Frequency: Never     Binge frequency: Never   • Drug use: Not Currently     Frequency: 7.0 times per week     Types: Marijuana   • Sexual activity: Not Currently     Birth control/protection: Pill       SURGICAL HISTORY   has a past surgical history that includes neuro dest facet l/s w/ig sngl (4/21/2015); recovery (1/27/2016); delmar by laparoscopy (8/29/2010); lumbar fusion anterior (8/21/2012); other cardiac surgery (1/2016); tonsillectomy; bowel stimulator insertion (7/13/2016); gastroscopy with balloon dilatation (N/A, 1/4/2017); muscle biopsy (Right, 1/26/2017); other abdominal surgery; and laminotomy.    CURRENT MEDICATIONS  Home Medications    **Home medications have not yet been reviewed for this encounter**         ALLERGIES  Allergies   Allergen Reactions   • Depakote [Divalproex Sodium] Unspecified     Muscle spasms/muscle pain and weakness     • Amitriptyline Unspecified     Headaches     • Aripiprazole [Abilify] Unspecified     Headaches/muscle twitching     • Clindamycin Nausea     Even with food     • Flagyl [Metronidazole Hcl] Unspecified     \"eye problems\"     • Flomax [Tamsulosin Hydrochloride] Swelling   • Levaquin Unspecified     Severe muscle cramps in legs  RXN=unknown   • Metformin Unspecified     Increased lactic acid      • Tape Rash     Tears skin off  coban with Tegaderm tape ok intermittently  RXN=ongoing   • Vancomycin Itching     Pt becomes flushed in face and chest.   RXN=7/10/16   • Wound Dressing Adhesive " Hives     By pt report   • Ciprofloxacin    • Hydromorphone Hcl      Pt states she feels loopy   • Keflex Rash     Pt states she gets a rash with this medication  Tolerates ceftriaxone   • Levofloxacin Unspecified     Leg muscle cramps   • Metronidazole Rash     .   • Penicillins Hives   • Tamsulosin Swelling   • Valproic Acid Rash     .       PHYSICAL EXAM  Vitals:    12/21/20 1712   BP: 127/75   Pulse: 84   Resp:    Temp:    SpO2: 96%       Physical Exam   Constitutional: She is oriented to person, place, and time. She appears well-developed and well-nourished.   HENT:   Head: Normocephalic and atraumatic.   Eyes: Conjunctivae are normal.   Neck: Normal range of motion. Neck supple.   Cardiovascular: Normal rate and regular rhythm.   Pulmonary/Chest: Effort normal and breath sounds normal.   Abdominal: Soft. Bowel sounds are normal. She exhibits no distension. There is no abdominal tenderness. There is no rebound.   Genitourinary:    Genitourinary Comments: Suprapubic catheter is in place, there is a 14 [place], is connected to a not insignificant amount of IV tubing which is twisted and nodded throughout.  The urine then drained into a bag which patient has in a wishkicker bag     Neurological: She is alert and oriented to person, place, and time.   Skin: Skin is warm and dry. No rash noted.   Psychiatric: She has a normal mood and affect. Her behavior is normal.     Results for orders placed or performed during the hospital encounter of 12/21/20   CBC WITH DIFFERENTIAL   Result Value Ref Range    WBC 6.2 4.8 - 10.8 K/uL    RBC 4.25 4.20 - 5.40 M/uL    Hemoglobin 12.1 12.0 - 16.0 g/dL    Hematocrit 38.4 37.0 - 47.0 %    MCV 90.4 81.4 - 97.8 fL    MCH 28.5 27.0 - 33.0 pg    MCHC 31.5 (L) 33.6 - 35.0 g/dL    RDW 49.3 35.9 - 50.0 fL    Platelet Count 170 164 - 446 K/uL    MPV 11.4 9.0 - 12.9 fL    Neutrophils-Polys 60.80 44.00 - 72.00 %    Lymphocytes 25.80 22.00 - 41.00 %    Monocytes 8.70 0.00 - 13.40 %     Eosinophils 2.70 0.00 - 6.90 %    Basophils 1.00 0.00 - 1.80 %    Immature Granulocytes 1.00 (H) 0.00 - 0.90 %    Nucleated RBC 0.00 /100 WBC    Neutrophils (Absolute) 3.80 2.00 - 7.15 K/uL    Lymphs (Absolute) 1.61 1.00 - 4.80 K/uL    Monos (Absolute) 0.54 0.00 - 0.85 K/uL    Eos (Absolute) 0.17 0.00 - 0.51 K/uL    Baso (Absolute) 0.06 0.00 - 0.12 K/uL    Immature Granulocytes (abs) 0.06 0.00 - 0.11 K/uL    NRBC (Absolute) 0.00 K/uL   CMP   Result Value Ref Range    Sodium 138 135 - 145 mmol/L    Potassium 3.5 (L) 3.6 - 5.5 mmol/L    Chloride 111 96 - 112 mmol/L    Co2 18 (L) 20 - 33 mmol/L    Anion Gap 9.0 7.0 - 16.0    Glucose 102 (H) 65 - 99 mg/dL    Bun 11 8 - 22 mg/dL    Creatinine 0.67 0.50 - 1.40 mg/dL    Calcium 9.3 8.4 - 10.2 mg/dL    AST(SGOT) 19 12 - 45 U/L    ALT(SGPT) 32 2 - 50 U/L    Alkaline Phosphatase 73 30 - 99 U/L    Total Bilirubin 0.3 0.1 - 1.5 mg/dL    Albumin 4.1 3.2 - 4.9 g/dL    Total Protein 6.1 6.0 - 8.2 g/dL    Globulin 2.0 1.9 - 3.5 g/dL    A-G Ratio 2.1 g/dL   URINALYSIS,CULTURE IF INDICATED    Specimen: Urine, Suprapubic; Blood   Result Value Ref Range    Color Yellow     Character Cloudy (A)     Ph 5.5 5.0 - 8.0    Glucose Negative Negative mg/dL    Ketones Negative Negative mg/dL    Protein 100 (A) Negative mg/dL    Bilirubin Negative Negative    Nitrite Positive (A) Negative    Leukocyte Esterase Small (A) Negative    Occult Blood Large (A) Negative    Micro Urine Req Microscopic    REFRACTOMETER SG   Result Value Ref Range    Specific Gravity 1.024    URINE MICROSCOPIC (W/UA)   Result Value Ref Range    WBC Packed (A) /hpf    RBC 0-2 /hpf    Bacteria Many (A) None /hpf    Epithelial Cells Few Few /hpf    Ca Oxalate Crystal Few /hpf    Hyaline Cast 0-2 /lpf   ESTIMATED GFR   Result Value Ref Range    GFR If African American >60 >60 mL/min/1.73 m 2    GFR If Non African American >60 >60 mL/min/1.73 m 2     *Note: Due to a large number of results and/or encounters for the requested  time period, some results have not been displayed. A complete set of results can be found in Results Review.         COURSE & MEDICAL DECISION MAKING  Pertinent Labs & Imaging studies reviewed. (See chart for details)    Well-appearing patient here with questionable cystitis secondary to renal obstruction secondary to tubing issue with her suprapubic catheter.  Patient has a history of Klebsiella, will give Bactrim.  Patient vomited the Bactrim, she was given Zofran and given Bactrim which she was able to hold down without issue.  Patient be discharged with Zofran.  She has been able to tolerate p.o. once Zofran is on board.  We attempted to get the correct tubing for the leg bag to suprapubic catheter however this was not available.  Therefore we used a small amount of IV tubing and connected this to the Andrade and then to a leg bag.  There is considerably less tubing to get knotted or tied similar to what patient came in with today.  Patient has follow-up with urology tomorrow.  She will follow-up with [facility] who placed the suprapubic catheter in the first place to receive the correct tubing but I do believe our solution should suffice in the interim.    The patient will return for worsening symptoms and is stable at the time of discharge. The patient verbalizes understanding and will comply.    FINAL IMPRESSION    1. Suprapubic catheter dysfunction, initial encounter ([facility])    2. Cystitis               Electronically signed by: Torres Can M.D., 12/21/2020 5:19 PM

## 2020-12-22 NOTE — ED NOTES
Per chidi, still working on finding the right person who can find the catheter tubing. erp made aware.

## 2020-12-22 NOTE — PROGRESS NOTES
Scheduled 1000 intake call, patient unable to keep appointment do to a scheduling conflict.  Patient seen in ER the day before and needed urology appointment today.  Intake call rescheduled for 2pm today.

## 2020-12-23 LAB
BACTERIA UR CULT: NORMAL
SIGNIFICANT IND 70042: NORMAL
SITE SITE: NORMAL
SOURCE SOURCE: NORMAL

## 2020-12-23 RX ORDER — TRAZODONE HYDROCHLORIDE 100 MG/1
TABLET ORAL
Qty: 90 TAB | Refills: 0 | Status: ON HOLD
Start: 2020-12-23 | End: 2020-12-30

## 2020-12-23 RX ORDER — FLUOXETINE HYDROCHLORIDE 40 MG/1
40 CAPSULE ORAL DAILY
Qty: 90 CAP | Refills: 0 | Status: SHIPPED | OUTPATIENT
Start: 2020-12-23 | End: 2021-03-11 | Stop reason: SDUPTHER

## 2020-12-23 RX ORDER — OXCARBAZEPINE 300 MG/1
300 TABLET, FILM COATED ORAL 2 TIMES DAILY
Qty: 180 TAB | Refills: 0 | Status: SHIPPED | OUTPATIENT
Start: 2020-12-23 | End: 2021-03-11 | Stop reason: SDUPTHER

## 2020-12-23 RX ORDER — BUSPIRONE HYDROCHLORIDE 15 MG/1
TABLET ORAL
Qty: 270 TAB | Refills: 0 | Status: ON HOLD
Start: 2020-12-23 | End: 2021-01-22

## 2020-12-23 RX ORDER — ZIPRASIDONE HYDROCHLORIDE 40 MG/1
CAPSULE ORAL
Qty: 180 CAP | Refills: 0 | Status: SHIPPED | OUTPATIENT
Start: 2020-12-23 | End: 2021-03-11 | Stop reason: SDUPTHER

## 2020-12-23 NOTE — ED PROVIDER NOTES
"ED Provider Note    CHIEF COMPLAINT  Chief Complaint   Patient presents with   • Urinary Catheter Problem     Pt. has suprapubic urinary catheter. Pt. reports abd pain, bilateral flank pain, bloody output from catheter. Reports feeling weak today, vomiting, and chills.    • Abdominal Pain   • Flank Pain   • Vomiting       HPI  Kristin Balderrama is a 30 y.o. female who presents stating that she had a suprapubic catheter changed out at [facility] [place] this morning and was diagnosed with a urinary tract infection last night by Dr. Can.  She states that she is feeling more weak today with occasional vomiting and chills.  States that she has been able to take fluids and did take her antibiotic this morning.  Is concerned that the catheter may not be draining well.  Denies any other complaints at this time    REVIEW OF SYSTEMS  See HPI for further details. All other systems are negative.     PAST MEDICAL HISTORY  Past Medical History:   Diagnosis Date   • Abdominal pain    • Anginal syndrome     random chest pain especially with tachycardia   • Apnea, sleep    • Arrhythmia     \"sinus tachycardia\", cariologist, Dr. Kumar; ablation 2/2016   • Arthritis     osteo   • ASTHMA     when around smoke   • Atrial fibrillation (HCC)    • Back pain    • Borderline personality disorder (HCC)    • Breath shortness     with tachycardia   • Bronchitis    • Cardiac arrhythmia    • Chickenpox    • Chronic UTI 9/18/2010   • Cough    • Daytime sleepiness    • Depression    • Diabetes (HCC)    • Diarrhea    • Disorder of thyroid    • Fall    • Fatigue    • Frequent headaches    • Gasping for breath    • Gynecological disorder     PCOS   • Headache(784.0)    • Heart burn    • History of falling    • Hypertension    • Indigestion    • Migraine    • 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HCC)    • 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HCC)    • Nausea    • Obesity    • Other fatigue 6/29/2020   • Pain 08-15-12    back, 7/10   • Painful joint    • PCOS " "(polycystic ovarian syndrome)    • Pneumonia 2012   • Psychosis (HCC)    • Renal disorder     \"kidney disease, stage 1\" nephrologist, Dr. 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   • Urinary bladder disorder     Suprapubic cath   • Urinary incontinence    • Weakness    • Wears glasses        FAMILY HISTORY  Family History   Problem Relation Age of Onset   • Hypertension Mother    • Sleep Apnea Mother    • Heart Disease Mother    • Other Mother         hypothryod   • Hypertension Maternal Uncle    • Heart Disease Maternal Grandmother    • Hypertension Maternal Grandmother    • No Known Problems Sister    • Other Sister         Narcolepsy;fibromyalsia;bone;nerve   • Genitourinary () Problems Sister         endometriosis       SOCIAL HISTORY   reports that she has never smoked. She has never used smokeless tobacco. She reports previous drug use. Frequency: 7.00 times per week. Drug: Marijuana. She reports that she does not drink alcohol.    SURGICAL HISTORY  Past Surgical History:   Procedure Laterality Date   • MUSCLE BIOPSY Right 1/26/2017    Procedure: MUSCLE BIOPSY - THIGH;  Surgeon: Isidro Vigil M.D.;  Location: [facility];  Service:    • GASTROSCOPY WITH BALLOON DILATATION N/A 1/4/2017    Procedure: GASTROSCOPY WITH DILATATION;  Surgeon: Torres Vargas M.D.;  Location: [facility];  Service:    • BOWEL STIMULATOR INSERTION  7/13/2016    Procedure: BOWEL STIMULATOR INSERTION FOR PERMANENT INTERSTIM SACRAL IMPLANT;  Surgeon: Joe Noyola M.D.;  Location: SURGERY [facility];  Service:    • RECOVERY  1/27/2016    Procedure: CATH LAB EP STUDY WITH SINUS NODE MODIFICATION ABHINAV;  Surgeon: Recoveryonly Surgery;  Location: SURGERY " PRE-POST PROC UNIT [facility];  Service:    • OTHER CARDIAC SURGERY  1/2016    cardiac ablation   • NEURO DEST FACET L/S W/IG SNGL  4/21/2015    Performed by Reza Tabor at SURGERY [facility]   • LUMBAR FUSION ANTERIOR  8/21/2012    Performed by SUSIE SAWANT at SURGERY [facility]S   • ALYSSA BY LAPAROSCOPY  8/29/2010    Performed by SHAYY JOHNS at SURGERY [facility] ORS   • LAMINOTOMY     • OTHER ABDOMINAL SURGERY     • TONSILLECTOMY      tonsillectomy       CURRENT MEDICATIONS  Home Medications     Reviewed by Ulisses Webster (Pharmacy Tech) on 12/22/20 at 1600  Med List Status: Complete   Medication Last Dose Status   acetaminophen (TYLENOL) 500 MG Tab 12/22/2020 Active   acetaZOLAMIDE SR (DIAMOX) 500 MG CAPSULE SR 12 HR 12/22/2020 Active   albuterol 108 (90 Base) MCG/ACT Aero Soln inhalation aerosol > 1 week Active   aspirin EC (ECOTRIN) 81 MG Tablet Delayed Response 12/22/2020 Active   busPIRone (BUSPAR) 10 MG Tab tablet 12/22/2020 Active   calcium carbonate (TUMS EX) 750 MG chewable tablet 12/22/2020 Active   Dulaglutide (TRULICITY) 3 MG/0.5ML Solution Pen-injector 12/18/2020 Active   fluoxetine (PROZAC) 40 MG capsule 12/22/2020 Active   gabapentin (NEURONTIN) 300 MG Cap 12/21/2020 Active   insulin glargine (LANTUS) 100 UNIT/ML Solution Not Taking Active   ipratropium-albuterol (DUONEB) 0.5-2.5 (3) MG/3ML nebulizer solution > 1 week Active   ivabradine (CORLANOR) 7.5 MG Tab tablet 12/21/2020 Active   levothyroxine (SYNTHROID) 100 MCG Tab 12/22/2020 Active   levothyroxine (SYNTHROID) 75 MCG Tab Not Taking Active   methocarbamol (ROBAXIN) 750 MG Tab 12/21/2020 Active   Mirabegron ER (MYRBETRIQ) 50 MG TABLET SR 24 HR 12/22/2020 Active   mycophenolate (CELLCEPT) 500 MG tablet 12/22/2020 Active   omeprazole (PRILOSEC) 20 MG delayed-release capsule Not Taking Active   ondansetron (ZOFRAN ODT) 4 MG TABLET DISPERSIBLE 12/22/2020 Active   OXcarbazepine (TRILEPTAL) 150 MG Tab Not Taking Active  "  OXcarbazepine (TRILEPTAL) 300 MG Tab 12/22/2020 Active   oxybutynin (DITROPAN) 5 MG Tab 12/22/2020 Active   potassium chloride SA (KDUR) 20 MEQ Tab CR 12/21/2020 Active   sodium bicarbonate 325 MG Tab 12/22/2020 Active   sulfamethoxazole-trimethoprim (BACTRIM DS) 800-160 MG tablet 12/22/2020 Active   topiramate (TOPAMAX) 50 MG tablet 12/22/2020 Active   traZODone (DESYREL) 100 MG Tab 12/21/2020 Active   traZODone (DESYREL) 100 MG Tab Duplicate Active   vitamin D, Ergocalciferol, (DRISDOL) 1.25 MG (74313 UT) Cap capsule 12/18/2020 Active   ziprasidone (GEODON) 40 MG Cap 12/22/2020 Active                ALLERGIES  Allergies   Allergen Reactions   • Depakote [Divalproex Sodium] Unspecified     Muscle spasms/muscle pain and weakness     • Amitriptyline Unspecified     Headaches     • Aripiprazole [Abilify] Unspecified     Headaches/muscle twitching     • Clindamycin Nausea     Even with food     • Flagyl [Metronidazole Hcl] Unspecified     \"eye problems\"     • Flomax [Tamsulosin Hydrochloride] Swelling   • Levaquin Unspecified     Severe muscle cramps in legs  RXN=unknown   • Metformin Unspecified     Increased lactic acid      • Tape Rash     Tears skin off  coban with Tegaderm tape ok intermittently  RXN=ongoing   • Vancomycin Itching     Pt becomes flushed in face and chest.   RXN=7/10/16   • Wound Dressing Adhesive Hives     By pt report   • Ciprofloxacin    • Hydromorphone Hcl      Pt states she feels loopy   • Keflex Rash     Pt states she gets a rash with this medication  Tolerates ceftriaxone   • Levofloxacin Unspecified     Leg muscle cramps   • Metronidazole Rash     .   • Penicillins Hives   • Tamsulosin Swelling   • Valproic Acid Rash     .       PHYSICAL EXAM  VITAL SIGNS: /68   Pulse 82   Temp 36.4 °C (97.5 °F) (Temporal)   Resp 20   Ht 1.651 m (5' 5\")   Wt 110.7 kg (244 lb)   SpO2 96%   BMI 40.60 kg/m²    Constitutional: Well developed, Well nourished, No acute distress, Non-toxic " appearance.   HENT: Normocephalic, Atraumatic, Bilateral external ears normal, Oropharynx is clear mucous membranes are moist. No oral exudates or nasal discharge.   Eyes: Pupils are equal round and reactive, EOMI, Conjunctiva normal, No discharge.   Neck: Normal range of motion, No tenderness, Supple, No stridor. No meningismus.  Lymphatic: No lymphadenopathy noted.   Cardiovascular: Regular rate and rhythm without murmur rub or gallop.  Thorax & Lungs: Clear breath sounds bilaterally without wheezes, rhonchi or rales. There is no chest wall tenderness.   Abdomen: Soft, obese, bit of tenderness diffusely in the abdomen is otherwise non-distended. There is no rebound or guarding. No organomegaly is appreciated. Bowel sounds are normal.  Skin: Normal without rash.  No erythema around her suprapubic catheter orifice  Back: No CVA or spinal tenderness.   Extremities: Intact distal pulses, No edema, No tenderness, No cyanosis, No clubbing. Capillary refill is less than 2 seconds.  Musculoskeletal: Good range of motion in all major joints. No tenderness to palpation or major deformities noted.   Neurologic: Alert & oriented x 3, Normal motor function, Normal sensory function, No focal deficits noted. Reflexes are normal.  Psychiatric: Affect normal, Judgment normal, Mood normal. There is no suicidal ideation or patient reported hallucinations.       RADIOLOGY/PROCEDURES  Bladder scan at the bedside showed 15 cc of urine in the bladder with suprapubic catheter present    COURSE & MEDICAL DECISION MAKING  Pertinent Labs & Imaging studies reviewed. (See chart for details)  The patient does not have any issues with her catheter.  Is draining adequately.  I reviewed her lab work yesterday which did not show any leukocytosis or shift.  I did not repeat this today.  I reviewed her urinalysis which shows acute cystitis with indwelling Andrade catheter and also reviewed her microbiology data which shows that back in October she was  positive for Klebsiella pneumonia in her urine    Patient is tolerating oral challenge and I gave her pain medication which helped.  I had a discussion with her the bedside about continuing her antibiotic therapy and drinking plenty of fluids which she is amenable to and she is discharged in stable condition with MiraLAX for ongoing constipation for more than a week as well    She will return if any significant change in symptoms but feels better on discharge    FINAL IMPRESSION  1. Acute cystitis without hematuria    2. Generalized abdominal pain    3. Suprapubic catheter (HCC)    4. Constipation, unspecified constipation type             Electronically signed by: Robin Sanchez M.D., 12/22/2020 4:43 PM

## 2020-12-23 NOTE — ED NOTES
Assisting with care. D/c instructions reviewed with pt. Aware of need to  meds at [facility] on [address], f/u with pcp and urology, return for worsening s/s. Update to primary rn

## 2020-12-23 NOTE — ED NOTES
assisting with care. Bladder scan preformed.update to erp. Med per same. Hot pack provided per pt request

## 2020-12-24 ENCOUNTER — TELEPHONE (OUTPATIENT)
Dept: BEHAVIORAL HEALTH | Facility: CLINIC | Age: 30
End: 2020-12-24

## 2020-12-24 NOTE — TELEPHONE ENCOUNTER
A pharmacy tech from [facility] [employer] called and left a voicemail on 12/23/2020 at 1:07pm. The patient was prescribed OXcarbazepine (TRILEPTAL) 300 MG Tab and in their records they have that the patient is allergic to AMITRIPTYLINE. They would like to verifiy that you are aware of this and would like to ok that the medication cn be filled. Please advise.

## 2020-12-25 ENCOUNTER — HOSPITAL ENCOUNTER (INPATIENT)
Facility: MEDICAL CENTER | Age: 30
LOS: 5 days | DRG: 699 | End: 2020-12-30
Attending: EMERGENCY MEDICINE | Admitting: INTERNAL MEDICINE
Payer: MEDICARE

## 2020-12-25 DIAGNOSIS — N39.0 ACUTE UTI: ICD-10-CM

## 2020-12-25 DIAGNOSIS — R19.7 NAUSEA VOMITING AND DIARRHEA: ICD-10-CM

## 2020-12-25 DIAGNOSIS — R94.31 PROLONGED QT INTERVAL: ICD-10-CM

## 2020-12-25 DIAGNOSIS — N39.0 COMPLICATED UTI (URINARY TRACT INFECTION): ICD-10-CM

## 2020-12-25 DIAGNOSIS — R11.2 NAUSEA VOMITING AND DIARRHEA: ICD-10-CM

## 2020-12-25 LAB
ALBUMIN SERPL BCP-MCNC: 4.2 G/DL (ref 3.2–4.9)
ALBUMIN/GLOB SERPL: 2.2 G/DL
ALP SERPL-CCNC: 69 U/L (ref 30–99)
ALT SERPL-CCNC: 26 U/L (ref 2–50)
ANION GAP SERPL CALC-SCNC: 12 MMOL/L (ref 7–16)
AST SERPL-CCNC: 16 U/L (ref 12–45)
BASOPHILS # BLD AUTO: 0.8 % (ref 0–1.8)
BASOPHILS # BLD: 0.04 K/UL (ref 0–0.12)
BILIRUB SERPL-MCNC: 0.4 MG/DL (ref 0.1–1.5)
BUN SERPL-MCNC: 5 MG/DL (ref 8–22)
CALCIUM SERPL-MCNC: 9.2 MG/DL (ref 8.5–10.5)
CHLORIDE SERPL-SCNC: 111 MMOL/L (ref 96–112)
CO2 SERPL-SCNC: 16 MMOL/L (ref 20–33)
COVID ORDER STATUS COVID19: NORMAL
CREAT SERPL-MCNC: 0.73 MG/DL (ref 0.5–1.4)
EKG IMPRESSION: NORMAL
EOSINOPHIL # BLD AUTO: 0.16 K/UL (ref 0–0.51)
EOSINOPHIL NFR BLD: 3.3 % (ref 0–6.9)
ERYTHROCYTE [DISTWIDTH] IN BLOOD BY AUTOMATED COUNT: 48.6 FL (ref 35.9–50)
FLUAV RNA SPEC QL NAA+PROBE: NEGATIVE
FLUBV RNA SPEC QL NAA+PROBE: NEGATIVE
GLOBULIN SER CALC-MCNC: 1.9 G/DL (ref 1.9–3.5)
GLUCOSE BLD-MCNC: 90 MG/DL (ref 65–99)
GLUCOSE SERPL-MCNC: 100 MG/DL (ref 65–99)
HCT VFR BLD AUTO: 38.4 % (ref 37–47)
HGB BLD-MCNC: 12.2 G/DL (ref 12–16)
IMM GRANULOCYTES # BLD AUTO: 0.04 K/UL (ref 0–0.11)
IMM GRANULOCYTES NFR BLD AUTO: 0.8 % (ref 0–0.9)
LACTATE BLD-SCNC: 1.6 MMOL/L (ref 0.5–2)
LYMPHOCYTES # BLD AUTO: 1.2 K/UL (ref 1–4.8)
LYMPHOCYTES NFR BLD: 24.5 % (ref 22–41)
MAGNESIUM SERPL-MCNC: 1.8 MG/DL (ref 1.5–2.5)
MCH RBC QN AUTO: 28.8 PG (ref 27–33)
MCHC RBC AUTO-ENTMCNC: 31.8 G/DL (ref 33.6–35)
MCV RBC AUTO: 90.8 FL (ref 81.4–97.8)
MONOCYTES # BLD AUTO: 0.46 K/UL (ref 0–0.85)
MONOCYTES NFR BLD AUTO: 9.4 % (ref 0–13.4)
NEUTROPHILS # BLD AUTO: 2.99 K/UL (ref 2–7.15)
NEUTROPHILS NFR BLD: 61.2 % (ref 44–72)
NRBC # BLD AUTO: 0 K/UL
NRBC BLD-RTO: 0 /100 WBC
PHOSPHATE SERPL-MCNC: 3.2 MG/DL (ref 2.5–4.5)
PLATELET # BLD AUTO: 174 K/UL (ref 164–446)
PMV BLD AUTO: 11.8 FL (ref 9–12.9)
POTASSIUM SERPL-SCNC: 3.7 MMOL/L (ref 3.6–5.5)
PROT SERPL-MCNC: 6.1 G/DL (ref 6–8.2)
RBC # BLD AUTO: 4.23 M/UL (ref 4.2–5.4)
RSV RNA SPEC QL NAA+PROBE: NEGATIVE
SARS-COV-2 RNA RESP QL NAA+PROBE: NOTDETECTED
SODIUM SERPL-SCNC: 139 MMOL/L (ref 135–145)
SPECIMEN SOURCE: NORMAL
TROPONIN T SERPL-MCNC: 7 NG/L (ref 6–19)
WBC # BLD AUTO: 4.9 K/UL (ref 4.8–10.8)

## 2020-12-25 PROCEDURE — 99223 1ST HOSP IP/OBS HIGH 75: CPT | Mod: AI | Performed by: INTERNAL MEDICINE

## 2020-12-25 PROCEDURE — 99285 EMERGENCY DEPT VISIT HI MDM: CPT

## 2020-12-25 PROCEDURE — 700102 HCHG RX REV CODE 250 W/ 637 OVERRIDE(OP): Performed by: INTERNAL MEDICINE

## 2020-12-25 PROCEDURE — 82962 GLUCOSE BLOOD TEST: CPT

## 2020-12-25 PROCEDURE — 87077 CULTURE AEROBIC IDENTIFY: CPT

## 2020-12-25 PROCEDURE — 87040 BLOOD CULTURE FOR BACTERIA: CPT

## 2020-12-25 PROCEDURE — 84484 ASSAY OF TROPONIN QUANT: CPT

## 2020-12-25 PROCEDURE — 96375 TX/PRO/DX INJ NEW DRUG ADDON: CPT

## 2020-12-25 PROCEDURE — 87086 URINE CULTURE/COLONY COUNT: CPT

## 2020-12-25 PROCEDURE — 700105 HCHG RX REV CODE 258: Performed by: EMERGENCY MEDICINE

## 2020-12-25 PROCEDURE — 0241U HCHG SARS-COV-2 COVID-19 NFCT DS RESP RNA 4 TRGT MIC: CPT

## 2020-12-25 PROCEDURE — 96365 THER/PROPH/DIAG IV INF INIT: CPT

## 2020-12-25 PROCEDURE — 93005 ELECTROCARDIOGRAM TRACING: CPT | Performed by: NURSE PRACTITIONER

## 2020-12-25 PROCEDURE — A9270 NON-COVERED ITEM OR SERVICE: HCPCS | Performed by: INTERNAL MEDICINE

## 2020-12-25 PROCEDURE — 700111 HCHG RX REV CODE 636 W/ 250 OVERRIDE (IP): Performed by: NURSE PRACTITIONER

## 2020-12-25 PROCEDURE — 83605 ASSAY OF LACTIC ACID: CPT

## 2020-12-25 PROCEDURE — 85025 COMPLETE CBC W/AUTO DIFF WBC: CPT

## 2020-12-25 PROCEDURE — 770021 HCHG ROOM/CARE - ISO PRIVATE

## 2020-12-25 PROCEDURE — 700105 HCHG RX REV CODE 258: Performed by: NURSE PRACTITIONER

## 2020-12-25 PROCEDURE — 93005 ELECTROCARDIOGRAM TRACING: CPT | Performed by: EMERGENCY MEDICINE

## 2020-12-25 PROCEDURE — 80053 COMPREHEN METABOLIC PANEL: CPT

## 2020-12-25 PROCEDURE — 700111 HCHG RX REV CODE 636 W/ 250 OVERRIDE (IP): Performed by: EMERGENCY MEDICINE

## 2020-12-25 PROCEDURE — 83735 ASSAY OF MAGNESIUM: CPT

## 2020-12-25 PROCEDURE — 84100 ASSAY OF PHOSPHORUS: CPT

## 2020-12-25 PROCEDURE — 87186 SC STD MICRODIL/AGAR DIL: CPT

## 2020-12-25 PROCEDURE — C9803 HOPD COVID-19 SPEC COLLECT: HCPCS | Performed by: EMERGENCY MEDICINE

## 2020-12-25 PROCEDURE — 93005 ELECTROCARDIOGRAM TRACING: CPT

## 2020-12-25 PROCEDURE — 700111 HCHG RX REV CODE 636 W/ 250 OVERRIDE (IP): Performed by: INTERNAL MEDICINE

## 2020-12-25 RX ORDER — POTASSIUM CHLORIDE 20 MEQ/1
40 TABLET, EXTENDED RELEASE ORAL 2 TIMES DAILY
Status: DISCONTINUED | OUTPATIENT
Start: 2020-12-25 | End: 2020-12-30 | Stop reason: HOSPADM

## 2020-12-25 RX ORDER — ZIPRASIDONE HYDROCHLORIDE 40 MG/1
40 CAPSULE ORAL 2 TIMES DAILY
Status: DISCONTINUED | OUTPATIENT
Start: 2020-12-25 | End: 2020-12-30 | Stop reason: HOSPADM

## 2020-12-25 RX ORDER — SODIUM CHLORIDE 9 MG/ML
INJECTION, SOLUTION INTRAVENOUS CONTINUOUS
Status: DISCONTINUED | OUTPATIENT
Start: 2020-12-25 | End: 2020-12-30

## 2020-12-25 RX ORDER — OXYBUTYNIN CHLORIDE 5 MG/1
5 TABLET ORAL 3 TIMES DAILY
Status: DISCONTINUED | OUTPATIENT
Start: 2020-12-25 | End: 2020-12-30 | Stop reason: HOSPADM

## 2020-12-25 RX ORDER — MYCOPHENOLATE MOFETIL 250 MG/1
500 CAPSULE ORAL 2 TIMES DAILY
Status: DISCONTINUED | OUTPATIENT
Start: 2020-12-25 | End: 2020-12-30 | Stop reason: HOSPADM

## 2020-12-25 RX ORDER — BUSPIRONE HYDROCHLORIDE 10 MG/1
15 TABLET ORAL 3 TIMES DAILY
Status: DISCONTINUED | OUTPATIENT
Start: 2020-12-25 | End: 2020-12-30 | Stop reason: HOSPADM

## 2020-12-25 RX ORDER — KETOROLAC TROMETHAMINE 30 MG/ML
30 INJECTION, SOLUTION INTRAMUSCULAR; INTRAVENOUS EVERY 6 HOURS
Status: DISPENSED | OUTPATIENT
Start: 2020-12-25 | End: 2020-12-30

## 2020-12-25 RX ORDER — KETOROLAC TROMETHAMINE 30 MG/ML
15 INJECTION, SOLUTION INTRAMUSCULAR; INTRAVENOUS ONCE
Status: COMPLETED | OUTPATIENT
Start: 2020-12-25 | End: 2020-12-25

## 2020-12-25 RX ORDER — TOPIRAMATE 25 MG/1
50 TABLET ORAL 2 TIMES DAILY
Status: DISCONTINUED | OUTPATIENT
Start: 2020-12-25 | End: 2020-12-30 | Stop reason: HOSPADM

## 2020-12-25 RX ORDER — SODIUM CHLORIDE 9 MG/ML
1000 INJECTION, SOLUTION INTRAVENOUS ONCE
Status: COMPLETED | OUTPATIENT
Start: 2020-12-25 | End: 2020-12-25

## 2020-12-25 RX ORDER — IPRATROPIUM BROMIDE AND ALBUTEROL SULFATE 2.5; .5 MG/3ML; MG/3ML
3 SOLUTION RESPIRATORY (INHALATION) EVERY 4 HOURS PRN
Status: DISCONTINUED | OUTPATIENT
Start: 2020-12-25 | End: 2020-12-25

## 2020-12-25 RX ORDER — SODIUM BICARBONATE 650 MG/1
650 TABLET ORAL 2 TIMES DAILY
Status: DISCONTINUED | OUTPATIENT
Start: 2020-12-25 | End: 2020-12-30 | Stop reason: HOSPADM

## 2020-12-25 RX ORDER — MORPHINE SULFATE 4 MG/ML
2 INJECTION, SOLUTION INTRAMUSCULAR; INTRAVENOUS
Status: DISCONTINUED | OUTPATIENT
Start: 2020-12-25 | End: 2020-12-30 | Stop reason: HOSPADM

## 2020-12-25 RX ORDER — OXCARBAZEPINE 300 MG/1
300 TABLET, FILM COATED ORAL 2 TIMES DAILY
Status: DISCONTINUED | OUTPATIENT
Start: 2020-12-25 | End: 2020-12-30 | Stop reason: HOSPADM

## 2020-12-25 RX ORDER — OMEPRAZOLE 20 MG/1
20 CAPSULE, DELAYED RELEASE ORAL DAILY
Status: DISCONTINUED | OUTPATIENT
Start: 2020-12-25 | End: 2020-12-30 | Stop reason: HOSPADM

## 2020-12-25 RX ORDER — METHOCARBAMOL 750 MG/1
750 TABLET, FILM COATED ORAL
Status: DISCONTINUED | OUTPATIENT
Start: 2020-12-25 | End: 2020-12-30 | Stop reason: HOSPADM

## 2020-12-25 RX ORDER — IPRATROPIUM BROMIDE AND ALBUTEROL SULFATE 2.5; .5 MG/3ML; MG/3ML
3 SOLUTION RESPIRATORY (INHALATION)
Status: DISCONTINUED | OUTPATIENT
Start: 2020-12-25 | End: 2020-12-30 | Stop reason: HOSPADM

## 2020-12-25 RX ORDER — ALBUTEROL SULFATE 90 UG/1
2 AEROSOL, METERED RESPIRATORY (INHALATION) EVERY 6 HOURS PRN
Status: DISCONTINUED | OUTPATIENT
Start: 2020-12-25 | End: 2020-12-30 | Stop reason: HOSPADM

## 2020-12-25 RX ORDER — LEVOTHYROXINE SODIUM 0.07 MG/1
75 TABLET ORAL
Status: DISCONTINUED | OUTPATIENT
Start: 2020-12-25 | End: 2020-12-30 | Stop reason: HOSPADM

## 2020-12-25 RX ORDER — FLUOXETINE HYDROCHLORIDE 20 MG/1
40 CAPSULE ORAL DAILY
Refills: 0 | Status: DISCONTINUED | OUTPATIENT
Start: 2020-12-25 | End: 2020-12-30 | Stop reason: HOSPADM

## 2020-12-25 RX ORDER — GABAPENTIN 300 MG/1
300 CAPSULE ORAL EVERY EVENING
Status: DISCONTINUED | OUTPATIENT
Start: 2020-12-25 | End: 2020-12-30 | Stop reason: HOSPADM

## 2020-12-25 RX ORDER — MORPHINE SULFATE 4 MG/ML
4 INJECTION, SOLUTION INTRAMUSCULAR; INTRAVENOUS ONCE
Status: COMPLETED | OUTPATIENT
Start: 2020-12-25 | End: 2020-12-25

## 2020-12-25 RX ORDER — TRAZODONE HYDROCHLORIDE 50 MG/1
100 TABLET ORAL EVERY EVENING
Status: DISCONTINUED | OUTPATIENT
Start: 2020-12-25 | End: 2020-12-30 | Stop reason: HOSPADM

## 2020-12-25 RX ADMIN — SODIUM BICARBONATE 650 MG: 650 TABLET ORAL at 17:13

## 2020-12-25 RX ADMIN — OXCARBAZEPINE 300 MG: 300 TABLET, FILM COATED ORAL at 17:13

## 2020-12-25 RX ADMIN — METHOCARBAMOL TABLETS 750 MG: 750 TABLET, COATED ORAL at 21:00

## 2020-12-25 RX ADMIN — BUSPIRONE HYDROCHLORIDE 15 MG: 10 TABLET ORAL at 11:42

## 2020-12-25 RX ADMIN — BUSPIRONE HYDROCHLORIDE 15 MG: 10 TABLET ORAL at 17:13

## 2020-12-25 RX ADMIN — KETOROLAC TROMETHAMINE 30 MG: 30 INJECTION, SOLUTION INTRAMUSCULAR at 23:34

## 2020-12-25 RX ADMIN — SODIUM CHLORIDE: 9 INJECTION, SOLUTION INTRAVENOUS at 09:12

## 2020-12-25 RX ADMIN — ZIPRASIDONE HYDROCHLORIDE 40 MG: 40 CAPSULE ORAL at 06:18

## 2020-12-25 RX ADMIN — OXYBUTYNIN CHLORIDE 5 MG: 5 TABLET ORAL at 11:41

## 2020-12-25 RX ADMIN — POTASSIUM CHLORIDE 40 MEQ: 1500 TABLET, EXTENDED RELEASE ORAL at 17:13

## 2020-12-25 RX ADMIN — BUSPIRONE HYDROCHLORIDE 15 MG: 10 TABLET ORAL at 06:24

## 2020-12-25 RX ADMIN — OXCARBAZEPINE 300 MG: 300 TABLET, FILM COATED ORAL at 06:21

## 2020-12-25 RX ADMIN — KETOROLAC TROMETHAMINE 15 MG: 30 INJECTION, SOLUTION INTRAMUSCULAR at 09:04

## 2020-12-25 RX ADMIN — SODIUM CHLORIDE 1000 ML: 9 INJECTION, SOLUTION INTRAVENOUS at 05:14

## 2020-12-25 RX ADMIN — OXYBUTYNIN CHLORIDE 5 MG: 5 TABLET ORAL at 06:23

## 2020-12-25 RX ADMIN — FLUOXETINE 40 MG: 20 CAPSULE ORAL at 06:15

## 2020-12-25 RX ADMIN — CEFTRIAXONE SODIUM 2 G: 2 INJECTION, POWDER, FOR SOLUTION INTRAMUSCULAR; INTRAVENOUS at 03:19

## 2020-12-25 RX ADMIN — MYCOPHENOLATE MOFETIL 500 MG: 250 CAPSULE ORAL at 18:29

## 2020-12-25 RX ADMIN — KETOROLAC TROMETHAMINE 30 MG: 30 INJECTION, SOLUTION INTRAMUSCULAR at 13:43

## 2020-12-25 RX ADMIN — TOPIRAMATE 50 MG: 25 TABLET, FILM COATED ORAL at 06:23

## 2020-12-25 RX ADMIN — TRAZODONE HYDROCHLORIDE 100 MG: 50 TABLET ORAL at 17:14

## 2020-12-25 RX ADMIN — LIDOCAINE HYDROCHLORIDE 15 ML: 20 SOLUTION OROPHARYNGEAL at 23:34

## 2020-12-25 RX ADMIN — MORPHINE SULFATE 2 MG: 4 INJECTION INTRAVENOUS at 21:17

## 2020-12-25 RX ADMIN — GABAPENTIN 300 MG: 300 CAPSULE ORAL at 17:13

## 2020-12-25 RX ADMIN — OXYBUTYNIN CHLORIDE 5 MG: 5 TABLET ORAL at 17:13

## 2020-12-25 RX ADMIN — TOPIRAMATE 50 MG: 25 TABLET, FILM COATED ORAL at 17:14

## 2020-12-25 RX ADMIN — OMEPRAZOLE 20 MG: 20 CAPSULE, DELAYED RELEASE ORAL at 06:21

## 2020-12-25 RX ADMIN — IVABRADINE 7.5 MG: 7.5 TABLET, FILM COATED ORAL at 06:21

## 2020-12-25 RX ADMIN — MYCOPHENOLATE MOFETIL 500 MG: 250 CAPSULE ORAL at 06:22

## 2020-12-25 RX ADMIN — MORPHINE SULFATE 4 MG: 4 INJECTION INTRAVENOUS at 03:19

## 2020-12-25 RX ADMIN — KETOROLAC TROMETHAMINE 15 MG: 30 INJECTION, SOLUTION INTRAMUSCULAR at 06:10

## 2020-12-25 ASSESSMENT — ENCOUNTER SYMPTOMS
NAUSEA: 1
ABDOMINAL PAIN: 1
FLANK PAIN: 1
DIARRHEA: 1
EYES NEGATIVE: 1
HEADACHES: 1
CARDIOVASCULAR NEGATIVE: 1
BACK PAIN: 1
RESPIRATORY NEGATIVE: 1
MYALGIAS: 1
WEAKNESS: 1
CHILLS: 1
VOMITING: 1
HEARTBURN: 1
BLOOD IN STOOL: 0
NERVOUS/ANXIOUS: 1
FEVER: 1

## 2020-12-25 ASSESSMENT — PAIN DESCRIPTION - PAIN TYPE
TYPE: ACUTE PAIN

## 2020-12-25 ASSESSMENT — LIFESTYLE VARIABLES
EVER FELT BAD OR GUILTY ABOUT YOUR DRINKING: NO
TOTAL SCORE: 0
AVERAGE NUMBER OF DAYS PER WEEK YOU HAVE A DRINK CONTAINING ALCOHOL: 0
HAVE PEOPLE ANNOYED YOU BY CRITICIZING YOUR DRINKING: NO
CONSUMPTION TOTAL: NEGATIVE
TOTAL SCORE: 0
HOW MANY TIMES IN THE PAST YEAR HAVE YOU HAD 5 OR MORE DRINKS IN A DAY: 0
ON A TYPICAL DAY WHEN YOU DRINK ALCOHOL HOW MANY DRINKS DO YOU HAVE: 0
HAVE YOU EVER FELT YOU SHOULD CUT DOWN ON YOUR DRINKING: NO
DO YOU DRINK ALCOHOL: NO
EVER HAD A DRINK FIRST THING IN THE MORNING TO STEADY YOUR NERVES TO GET RID OF A HANGOVER: NO
TOTAL SCORE: 0

## 2020-12-25 ASSESSMENT — COPD QUESTIONNAIRES
DURING THE PAST 4 WEEKS HOW MUCH DID YOU FEEL SHORT OF BREATH: NONE/LITTLE OF THE TIME
COPD SCREENING SCORE: 2
DO YOU EVER COUGH UP ANY MUCUS OR PHLEGM?: NO/ONLY WITH OCCASIONAL COLDS OR INFECTIONS
HAVE YOU SMOKED AT LEAST 100 CIGARETTES IN YOUR ENTIRE LIFE: YES

## 2020-12-25 ASSESSMENT — FIBROSIS 4 INDEX: FIB4 SCORE: 0.61
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
## 2020-12-25 NOTE — ED TRIAGE NOTES
.  Vitals:    12/25/20 0230   BP: 127/68   Pulse: 73   Resp: 16   Temp: 37.5 °C (99.5 °F)   SpO2: 99%     .  Chief Complaint   Patient presents with   • Nausea/Vomiting/Diarrhea     pt RX Bactrim on monday for a UTI. pt has suprapubic catheter. she's had n/v/d since starting ABX     Pt started Bactrim on Monday for a UTI. She has a suprapubic catheter that is causing her pain currently and she is c/o flank pain 9 out of 10. Since starting ABX on Monday she has been having n/v/d and has had poor PO intake. She has tried phenergan and zofran at home without relief. Pt is mostly bed bound at home with a hx of transverse myelitis. Her mother at bedside is her primary caregiver. Finally she is c/o chest pain on the left side. EKG completed.

## 2020-12-25 NOTE — ED PROVIDER NOTES
"ED Provider Note    Scribed for Jimmy Abbott M.D. by Berlin Larry. 12/25/2020  2:41 AM    Primary care provider: Sue Preston M.D.  Means of arrival: EMS  History obtained from: Patient  History limited by: None    CHIEF COMPLAINT  Chief Complaint   Patient presents with   • Nausea/Vomiting/Diarrhea     pt RX Bactrim on monday for a UTI. pt has suprapubic catheter. she's had n/v/d since starting ABX       HPI  Kristin Balderrama is a 30 y.o. female who presents to the Emergency Department for evaluation of nausea, vomiting and diarrhea. Patient was seen at [facility] on 12/21 for a problem with her urinary catheter and was discharged with a prescription for Bactrim which she has been taking as prescribed. Since starting her bactrim she has been having persistent episodes of nausea, vomiting and diarrhea. She states that she has been vomiting every time she tries to eat or drink for the last four days. Patient had a new suprapubic catheter placed on 12/22 and states that yesterday she started to suspect that it was infected while at home as it feels the same as past infections and she is having suprapubic pain and bilateral flank pain. Given that she is nauseous with vomiting and diarrhea, and she is concerned for a catheter infection, she has returned here for evaluation. Patient also notes that se has been running a tactile fever and is currently 99.5 °F upon arrival which she sates is a \"little warm\" for her. She otherwise denies having any abdominal pain, headaches, cough, shortness of breath or leg swelling.    REVIEW OF SYSTEMS  Pertinent positives include: nausea, vomiting, diarrhea, suprapubic pain, bilateral flank pain. Pertinent negatives include: abdominal pain, headaches, cough, shortness of breath, leg swelling. See history of present illness. All other systems are negative.     PAST MEDICAL HISTORY   has a past medical history of Abdominal pain, Anginal syndrome, Apnea, sleep, " Arrhythmia, Arthritis, ASTHMA, Atrial fibrillation (HCC), Back pain, Borderline personality disorder (HCC), Breath shortness, Bronchitis, Cardiac arrhythmia, Chickenpox, Chronic UTI (9/18/2010), Cough, Daytime sleepiness, Depression, Diabetes (HCC), Diarrhea, Disorder of thyroid, Fall, Fatigue, Frequent headaches, Gasping for breath, Gynecological disorder, Headache(784.0), Heart burn, History of falling, Hypertension, Indigestion, Migraine, 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HCC), 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HCC), Nausea, Obesity, Other fatigue (6/29/2020), Pain (08-15-12), Painful joint, PCOS (polycystic ovarian syndrome), Pneumonia (2012), Psychosis (HCC), Renal disorder, Ringing in ears, Scoliosis, Shortness of breath, Sinus tachycardia (10/31/2013), Sleep apnea, Snoring, Tonsillitis, 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[facility]), Tuberculosis, Urinary bladder disorder, Urinary incontinence, Weakness, and Wears glasses.    SURGICAL HISTORY   has a past surgical history that includes neuro dest facet l/s w/ig sngl (4/21/2015); recovery (1/27/2016); delmar by laparoscopy (8/29/2010); lumbar fusion anterior (8/21/2012); other cardiac surgery (1/2016); tonsillectomy; bowel stimulator insertion (7/13/2016); gastroscopy with balloon dilatation (N/A, 1/4/2017); muscle biopsy (Right, 1/26/2017); other abdominal surgery; and laminotomy.    SOCIAL HISTORY  Social History     Tobacco Use   • Smoking status: Never Smoker   • Smokeless tobacco: Never Used   Substance Use Topics   • Alcohol use: No     Alcohol/week: 0.0 oz     Frequency: Never     Binge frequency: Never   • Drug use: Not Currently     Frequency: 7.0 times per week     Types: Marijuana      Social History     Substance and Sexual Activity   Drug Use Not Currently   • Frequency: 7.0 times per week   • Types: Marijuana       FAMILY HISTORY  Family History   Problem Relation Age of Onset   • Hypertension Mother    • Sleep Apnea Mother    • Heart Disease Mother    • Other Mother          hypothryod   • Hypertension Maternal Uncle    • Heart Disease Maternal Grandmother    • Hypertension Maternal Grandmother    • No Known Problems Sister    • Other Sister         Narcolepsy;fibromyalsia;bone;nerve   • Genitourinary () Problems Sister         endometriosis       CURRENT MEDICATIONS  No current facility-administered medications on file prior to encounter.      Current Outpatient Medications on File Prior to Encounter   Medication Sig Dispense Refill   • fluoxetine (PROZAC) 40 MG capsule Take 1 Cap by mouth every day. 90 Cap 0   • OXcarbazepine (TRILEPTAL) 300 MG Tab Take 1 Tab by mouth 2 times a day. 180 Tab 0   • traZODone (DESYREL) 100 MG Tab Take 1 tablet by mouth at bedtime as needed for insomnia 90 Tab 0   • ziprasidone (GEODON) 40 MG Cap Take 1 tablet by mouth twice a day 180 Cap 0   • busPIRone (BUSPAR) 15 MG tablet Take 1 tablet by mouth 3 times a day 270 Tab 0   • albuterol 108 (90 Base) MCG/ACT Aero Soln inhalation aerosol Inhale 2 Puffs every 6 hours as needed for Shortness of Breath. 8.5 g 6   • levothyroxine (SYNTHROID) 75 MCG Tab Take 1 Tab by mouth Every morning on an empty stomach. 90 Tab 0   • Mirabegron ER (MYRBETRIQ) 50 MG TABLET SR 24 HR Take 50 mg by mouth every day. 90 Tab 3   • Dulaglutide (TRULICITY) 3 MG/0.5ML Solution Pen-injector Inject 3 mg under the skin every 7 days. 0.5 mL 6   • acetaminophen (TYLENOL) 500 MG Tab Take 1,000 mg by mouth every 6 hours as needed for Moderate Pain.     • sodium bicarbonate 325 MG Tab Take 650 mg by mouth 2 times a day.     • topiramate (TOPAMAX) 50 MG tablet Take 50 mg by mouth 2 times a day.     • polyethylene glycol 3350 (MIRALAX) 17 GM/SCOOP Powder Take 17 g by mouth 2 times a day for 30 days. 255 g 0   • sulfamethoxazole-trimethoprim (BACTRIM DS) 800-160 MG tablet Take 1 Tab by mouth 2 times a day for 10 days. (Patient taking differently: Take 1 Tab by mouth 2 times a day. Pt started on 12/22/2020 for 10 day course) 20 Tab 0   •  ondansetron (ZOFRAN ODT) 4 MG TABLET DISPERSIBLE Take 1 Tab by mouth every 6 hours as needed for Nausea. 10 Tab 0   • ipratropium-albuterol (DUONEB) 0.5-2.5 (3) MG/3ML nebulizer solution Take 3 mL by nebulization every four hours as needed for Shortness of Breath. Nebulizer      • mycophenolate (CELLCEPT) 500 MG tablet Take 2 Tabs by mouth 2 times a day. 60 Tab 2   • methocarbamol (ROBAXIN) 750 MG Tab Take 750 mg by mouth every bedtime. Indications: Musculoskeletal Pain     • OXcarbazepine (TRILEPTAL) 150 MG Tab TAKE ONE TABLET BY MOUTH TWICE DAILY (Patient not taking: Reported on 12/22/2020) 60 Tab 0   • calcium carbonate (TUMS EX) 750 MG chewable tablet Take 2 Tabs by mouth every day. (Patient taking differently: Chew 1,500 mg every day. Indications: Heartburn, Sour Stomach) 60 Tab 0   • vitamin D, Ergocalciferol, (DRISDOL) 1.25 MG (62103 UT) Cap capsule Take 50,000 Units by mouth every 7 days. On Friday     • insulin glargine (LANTUS) 100 UNIT/ML Solution Inject 8 Units as instructed every evening. (Patient not taking: Reported on 12/22/2020) 10 mL 0   • traZODone (DESYREL) 100 MG Tab Take 1 Tab by mouth every bedtime. (Patient taking differently: Take 100 mg by mouth every evening.) 30 Tab 3   • omeprazole (PRILOSEC) 20 MG delayed-release capsule Take 1 Cap by mouth every day. (Patient not taking: Reported on 12/22/2020) 30 Cap 0   • oxybutynin (DITROPAN) 5 MG Tab Take 1 Tab by mouth 3 times a day. 90 Tab 0   • acetaZOLAMIDE SR (DIAMOX) 500 MG CAPSULE SR 12 HR Take 500 mg by mouth 2 times a day.     • ivabradine (CORLANOR) 7.5 MG Tab tablet Take 7.5 mg by mouth 2 times a day, with meals.     • gabapentin (NEURONTIN) 300 MG Cap Take 300 mg by mouth every evening.     • aspirin EC (ECOTRIN) 81 MG Tablet Delayed Response Take 81 mg by mouth every day.     • potassium chloride SA (KDUR) 20 MEQ Tab CR Take 40 mEq by mouth 2 times a day.         ALLERGIES  Allergies   Allergen Reactions   • Depakote [Divalproex  "Sodium] Unspecified     Muscle spasms/muscle pain and weakness     • Amitriptyline Unspecified     Headaches     • Aripiprazole [Abilify] Unspecified     Headaches/muscle twitching     • Clindamycin Nausea     Even with food     • Flagyl [Metronidazole Hcl] Unspecified     \"eye problems\"     • Flomax [Tamsulosin Hydrochloride] Swelling   • Levaquin Unspecified     Severe muscle cramps in legs  RXN=unknown   • Metformin Unspecified     Increased lactic acid      • Tape Rash     Tears skin off  coban with Tegaderm tape ok intermittently  RXN=ongoing   • Vancomycin Itching     Pt becomes flushed in face and chest.   RXN=7/10/16   • Wound Dressing Adhesive Hives     By pt report   • Ciprofloxacin    • Hydromorphone Hcl      Pt states she feels loopy   • Keflex Rash     Pt states she gets a rash with this medication  Tolerates ceftriaxone   • Levofloxacin Unspecified     Leg muscle cramps   • Metronidazole Rash     .   • Penicillins Hives   • Tamsulosin Swelling   • Valproic Acid Rash     .       PHYSICAL EXAM  VITAL SIGNS: /68   Pulse 73   Temp 37.5 °C (99.5 °F) (Temporal)   Resp 16   Ht 1.651 m (5' 5\")   Wt 110.7 kg (244 lb)   SpO2 99%   BMI 40.60 kg/m²     Constitutional: Alert in no apparent distress.  HENT: No signs of trauma, Bilateral external ears normal, Nose normal. Uvula midline.   Eyes: Pupils are equal and reactive, Conjunctiva normal, Non-icteric.   Neck: Normal range of motion, No tenderness, Supple, No stridor.   Lymphatic: No lymphadenopathy noted.   Cardiovascular: Regular rate and rhythm, no murmurs.   Thorax & Lungs: Normal breath sounds, No respiratory distress, No wheezing, No chest tenderness.   Abdomen:  Soft, No tenderness, No peritoneal signs, No masses, No pulsatile masses.   Skin: Warm, Dry, No erythema, No rash.   Back: No bony tenderness, No CVA tenderness.   Extremities: Intact distal pulses, No edema, No tenderness, No cyanosis.  Musculoskeletal: Good range of motion in all " "major joints. No tenderness to palpation or major deformities noted.   Neurologic: Alert , Normal motor function, Normal sensory function, No focal deficits noted.   Psychiatric: Affect normal, Judgment normal, Mood normal.      DIAGNOSTIC STUDIES / PROCEDURES    LABS  Labs Reviewed   CBC WITH DIFFERENTIAL - Abnormal; Notable for the following components:       Result Value    MCHC 31.8 (*)     All other components within normal limits   COMP METABOLIC PANEL - Abnormal; Notable for the following components:    Co2 16 (*)     Glucose 100 (*)     Bun 5 (*)     All other components within normal limits   LACTIC ACID   TROPONIN   PHOSPHORUS   MAGNESIUM   BLOOD CULTURE    Narrative:     1 of 2 for Blood Culture x 2 sites order. Per Hospital  Policy: Only change Specimen Src: to \"Line\" if specified by  physician order.   BLOOD CULTURE    Narrative:     2 of 2 blood culture x2  Sites order. Per Hospital Policy:  Only change Specimen Src: to \"Line\" if specified by physician  order.   URINE CULTURE(NEW)    Narrative:     Indication for culture:->Patient WITHOUT an indwelling Andrade  catheter in place with new onset of Dysuria, Frequency,  Urgency, and/or Suprapubic pain   COVID/SARS COV-2    Narrative:     Is patient being admitted?->Yes  Does this patient meet criteria for Rush/Cepheid per [facility]  Inpatient Workflow? (See workflow link below)->Yes  Expected turn around time?->Rush (Cepheid 2-4 hours)  Have you been in close contact with a person who is suspected  or known to be positive for COVID-19 within the last 30 days  (e.g. last seen that person < 30 days ago)->No   COV-2, FLU A/B, AND RSV BY PCR    Narrative:     Is patient being admitted?->Yes  Does this patient meet criteria for Rush/Cepheid per [facility]  Inpatient Workflow? (See workflow link below)->Yes  Expected turn around time?->Rush (Cepheid 2-4 hours)  Have you been in close contact with a person who is suspected  or known to be positive for COVID-19 within the " last 30 days  (e.g. last seen that person < 30 days ago)->No   ESTIMATED GFR   CDIFF BY PCR RFLX TOXIN      All labs reviewed by me.    EKG  12 Lead EKG interpreted by me to show:  Indication: Chest pain  Normal sinus rhythm  Rate 75  Axis: Normal  Intervals: QTC prolongation to 487  Normal T waves  Normal ST segments  My impression of this EKG: No STEMI.   No significant change from prior EKG    COURSE & MEDICAL DECISION MAKING  Nursing notes, VS, PMSFHx reviewed in chart.    30 y.o. female p/w chief complaint of nausea, vomiting and diarrhea.    Review of patients past medical history shows she is taking Cellcept, Prednisone, and micophenylate for her history of optic neuritis.     2:41 AM Patient seen and examined at bedside. Patient will be treated with morphine 4 mg, Rocephin 2 g in Ns 100 ml. Ordered for Lactic acid, Urine culture, CBC with differential, CMP, Troponin, Phosphorous, Magnesium, Blood culture, Discussed with the patient that given she is vomiting and immunocompromised will plan to obtain labs and hospitalize her here for further observation and treatment.     I verified that the patient was wearing a mask and I was wearing appropriate PPE every time I entered the room. The patient's mask was on the patient at all times during my encounter except for a brief view of the oropharynx.     The differential diagnoses include but are not limited to:   #vomiting and diarrhea  No blood in vomit or diarrhea.  No recent trauma or foreign travel.  No focal RLQ abdominal pain to suggest appendicitis.  No persistent abdominal pain to suggest SBO.  Given immunosuppression on CellCept and mycophenolate plan for admission given inability to tolerate p.o. and numerous outpatient emergency department visits within the last 3 days with similar symptoms.    4:14 AM - Paged Hospitalist    4:40 AM - I spoke with Dr. Murcia, Hospitalist, who agrees to evaluate the patient for hospitalization.    4:42 AM - Patient treated  with NS 1 Liter    HYDRATION: Based on the patient's presentation of Acute Diarrhea and Acute Vomiting the patient was given IV fluids. IV Hydration was used because oral hydration was not adequate alone. Upon recheck following hydration, the patient was stable and pending hospitalization.    DISPOSITION:  Patient will be hospitalized by Dr. Murcia, Hospitalist in guarded condition.    FINAL IMPRESSION  1. Nausea vomiting and diarrhea    2. Acute UTI    3. Prolonged QT interval          Berlin RODRIGUEZ (Scribe), am scribing for, and in the presence of, Jimmy Abbott M.D..    Electronically signed by: Berlin Larry (Scribe), 12/25/2020    IJimmy M.D. personally performed the services described in this documentation, as scribed by Berlin Larry in my presence, and it is both accurate and complete. C.    The note accurately reflects work and decisions made by me.  Jimmy Abbott M.D.  12/25/2020  6:47 AM

## 2020-12-25 NOTE — ED NOTES
Pt resting in bed, VSS on RA, GCS 15, no distress, updated POC to admit once report is called, will continue to monitor.

## 2020-12-25 NOTE — H&P
[facility] Medicine History & Physical Note    Date of Service  12/25/2020    Primary Care Physician  Sue Preston M.D.    Consultants  None    Code Status  Full Code    Chief Complaint  Chief Complaint   Patient presents with   • Nausea/Vomiting/Diarrhea     pt RX Bactrim on monday for a UTI. pt has suprapubic catheter. she's had n/v/d since starting ABX       History of Presenting Illness  30 y.o. female who presented 12/25/2020 with abdominal pain, nausea, vomiting, fever, chills. Patient was recently seen in [address] where she was diagnosed with UTI and dischraged home on Bactrim. Patient has longstanding suprapubic jara catheter that was recently changed. She reports compliance to bactrim but developed nausea and vomiting that she can no longer tolerate PO intake and came to ED. Patient has history of multiple UTI with ESBL. Last positive culture from October grew Klebsiella sensitive to rocephin. Patient states that she feels febrile as well though no objective documentation of this. She also reports that since the antibiotics started, she's been having loose diarrhea.     Review of Systems  Review of Systems   Constitutional: Positive for chills, fever and malaise/fatigue.   HENT: Negative.    Eyes: Negative.    Respiratory: Negative.    Cardiovascular: Negative.    Gastrointestinal: Positive for abdominal pain, diarrhea, heartburn, nausea and vomiting. Negative for blood in stool.   Genitourinary: Positive for flank pain.   Musculoskeletal: Positive for back pain, joint pain and myalgias.   Skin: Negative.    Neurological: Positive for weakness and headaches.   Psychiatric/Behavioral: Negative for suicidal ideas. The patient is nervous/anxious.        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    Surgical History   has a past surgical history that includes neuro dest facet l/s w/ig sngl (4/21/2015); recovery (1/27/2016); delmar by laparoscopy (8/29/2010); lumbar fusion anterior (8/21/2012); other cardiac surgery (1/2016); tonsillectomy; bowel stimulator insertion (7/13/2016); gastroscopy with balloon dilatation (N/A, 1/4/2017); muscle biopsy (Right, 1/26/2017); other abdominal surgery; and laminotomy.     Family History  family history includes Genitourinary () Problems in her sister; Heart Disease in her maternal grandmother and mother; Hypertension in her maternal grandmother, maternal uncle, and mother; No Known Problems in her sister; Other in her mother and sister; Sleep Apnea in her mother.     Social History   reports that she has never smoked. She has never used smokeless tobacco. She reports previous drug use. Frequency: 7.00 times per week. Drug: Marijuana. She reports that she does not drink alcohol.    Allergies  Allergies   Allergen Reactions   • Depakote [Divalproex Sodium] Unspecified     Muscle spasms/muscle pain and weakness     • Amitriptyline Unspecified     Headaches     • Aripiprazole [Abilify] Unspecified      "Headaches/muscle twitching     • Clindamycin Nausea     Even with food     • Flagyl [Metronidazole Hcl] Unspecified     \"eye problems\"     • Flomax [Tamsulosin Hydrochloride] Swelling   • Levaquin Unspecified     Severe muscle cramps in legs  RXN=unknown   • Metformin Unspecified     Increased lactic acid      • Tape Rash     Tears skin off  coban with Tegaderm tape ok intermittently  RXN=ongoing   • Vancomycin Itching     Pt becomes flushed in face and chest.   RXN=7/10/16   • Wound Dressing Adhesive Hives     By pt report   • Ciprofloxacin    • Hydromorphone Hcl      Pt states she feels loopy   • Keflex Rash     Pt states she gets a rash with this medication  Tolerates ceftriaxone   • Levofloxacin Unspecified     Leg muscle cramps   • Metronidazole Rash     .   • Penicillins Hives   • Tamsulosin Swelling   • Valproic Acid Rash     .       Medications  Prior to Admission Medications   Prescriptions Last Dose Informant Patient Reported? Taking?   Dulaglutide (TRULICITY) 3 MG/0.5ML Solution Pen-injector   No No   Sig: Inject 3 mg under the skin every 7 days.   Mirabegron ER (MYRBETRIQ) 50 MG TABLET SR 24 HR 12/24/2020 at Unknown time  No No   Sig: Take 50 mg by mouth every day.   OXcarbazepine (TRILEPTAL) 150 MG Tab  Patient No No   Sig: TAKE ONE TABLET BY MOUTH TWICE DAILY   Patient not taking: Reported on 12/22/2020   OXcarbazepine (TRILEPTAL) 300 MG Tab 12/24/2020 at Unknown time  No No   Sig: Take 1 Tab by mouth 2 times a day.   acetaZOLAMIDE SR (DIAMOX) 500 MG CAPSULE SR 12 HR 12/24/2020 at Unknown time Patient Yes No   Sig: Take 500 mg by mouth 2 times a day.   acetaminophen (TYLENOL) 500 MG Tab  Patient Yes No   Sig: Take 1,000 mg by mouth every 6 hours as needed for Moderate Pain.   albuterol 108 (90 Base) MCG/ACT Aero Soln inhalation aerosol   No No   Sig: Inhale 2 Puffs every 6 hours as needed for Shortness of Breath.   aspirin EC (ECOTRIN) 81 MG Tablet Delayed Response 12/24/2020 at Unknown time Patient " Yes No   Sig: Take 81 mg by mouth every day.   busPIRone (BUSPAR) 15 MG tablet 12/24/2020 at Unknown time  No No   Sig: Take 1 tablet by mouth 3 times a day   calcium carbonate (TUMS EX) 750 MG chewable tablet  Patient No No   Sig: Take 2 Tabs by mouth every day.   Patient taking differently: Chew 1,500 mg every day. Indications: Heartburn, Sour Stomach   fluoxetine (PROZAC) 40 MG capsule 12/24/2020 at Unknown time  No No   Sig: Take 1 Cap by mouth every day.   gabapentin (NEURONTIN) 300 MG Cap  Patient Yes No   Sig: Take 300 mg by mouth every evening.   insulin glargine (LANTUS) 100 UNIT/ML Solution  Patient No No   Sig: Inject 8 Units as instructed every evening.   Patient not taking: Reported on 12/22/2020   ipratropium-albuterol (DUONEB) 0.5-2.5 (3) MG/3ML nebulizer solution  Patient Yes No   Sig: Take 3 mL by nebulization every four hours as needed for Shortness of Breath. Nebulizer    ivabradine (CORLANOR) 7.5 MG Tab tablet 12/24/2020 at Unknown time Patient Yes No   Sig: Take 7.5 mg by mouth 2 times a day, with meals.   levothyroxine (SYNTHROID) 75 MCG Tab 12/24/2020 at Unknown time  No No   Sig: Take 1 Tab by mouth Every morning on an empty stomach.   methocarbamol (ROBAXIN) 750 MG Tab 12/24/2020 at Unknown time Patient Yes No   Sig: Take 750 mg by mouth every bedtime. Indications: Musculoskeletal Pain   mycophenolate (CELLCEPT) 500 MG tablet 12/24/2020 at Unknown time Patient No No   Sig: Take 2 Tabs by mouth 2 times a day.   omeprazole (PRILOSEC) 20 MG delayed-release capsule  Patient No No   Sig: Take 1 Cap by mouth every day.   Patient not taking: Reported on 12/22/2020   ondansetron (ZOFRAN ODT) 4 MG TABLET DISPERSIBLE  Patient No No   Sig: Take 1 Tab by mouth every 6 hours as needed for Nausea.   oxybutynin (DITROPAN) 5 MG Tab 12/24/2020 at Unknown time Patient No No   Sig: Take 1 Tab by mouth 3 times a day.   polyethylene glycol 3350 (MIRALAX) 17 GM/SCOOP Powder   No No   Sig: Take 17 g by mouth 2  times a day for 30 days.   potassium chloride SA (KDUR) 20 MEQ Tab CR 12/24/2020 at Unknown time Patient Yes No   Sig: Take 40 mEq by mouth 2 times a day.   sodium bicarbonate 325 MG Tab 12/24/2020 at Unknown time Patient Yes No   Sig: Take 650 mg by mouth 2 times a day.   sulfamethoxazole-trimethoprim (BACTRIM DS) 800-160 MG tablet  Patient No No   Sig: Take 1 Tab by mouth 2 times a day for 10 days.   Patient taking differently: Take 1 Tab by mouth 2 times a day. Pt started on 12/22/2020 for 10 day course   topiramate (TOPAMAX) 50 MG tablet 12/24/2020 at Unknown time Patient Yes No   Sig: Take 50 mg by mouth 2 times a day.   traZODone (DESYREL) 100 MG Tab  Patient No No   Sig: Take 1 Tab by mouth every bedtime.   Patient taking differently: Take 100 mg by mouth every evening.   traZODone (DESYREL) 100 MG Tab 12/24/2020 at Unknown time  No No   Sig: Take 1 tablet by mouth at bedtime as needed for insomnia   vitamin D, Ergocalciferol, (DRISDOL) 1.25 MG (10173 UT) Cap capsule  Patient Yes No   Sig: Take 50,000 Units by mouth every 7 days. On Friday   ziprasidone (GEODON) 40 MG Cap 12/24/2020 at Unknown time  No No   Sig: Take 1 tablet by mouth twice a day      Facility-Administered Medications: None       Physical Exam  Temp:  [37.5 °C (99.5 °F)] 37.5 °C (99.5 °F)  Pulse:  [72-82] 82  Resp:  [16-25] 19  BP: (106-127)/(55-74) 110/63  SpO2:  [96 %-99 %] 96 %    Physical Exam  Vitals signs and nursing note reviewed.   Constitutional:       General: She is not in acute distress.     Appearance: She is obese. She is not ill-appearing or toxic-appearing.   HENT:      Head: Normocephalic and atraumatic.      Mouth/Throat:      Mouth: Mucous membranes are dry.      Pharynx: Oropharynx is clear. No oropharyngeal exudate or posterior oropharyngeal erythema.   Eyes:      General: No scleral icterus.     Extraocular Movements: Extraocular movements intact.      Conjunctiva/sclera: Conjunctivae normal.      Pupils: Pupils are  equal, round, and reactive to light.   Neck:      Musculoskeletal: Normal range of motion and neck supple. No muscular tenderness.   Cardiovascular:      Rate and Rhythm: Normal rate and regular rhythm.      Pulses: Normal pulses.      Heart sounds: Normal heart sounds.   Pulmonary:      Effort: Pulmonary effort is normal. No respiratory distress.      Breath sounds: Normal breath sounds. No rhonchi.   Abdominal:      General: Abdomen is flat. Bowel sounds are normal. There is no distension.      Palpations: Abdomen is soft. There is no mass.      Tenderness: There is abdominal tenderness.      Comments: Suprapubic catheter in place, draining bunny urine, no signs of erythema or infection around the jara site, complains of pain and grimaces to touch suprapubic area   Musculoskeletal: Normal range of motion.         General: No swelling or tenderness.   Skin:     General: Skin is warm and dry.   Neurological:      General: No focal deficit present.      Mental Status: She is alert and oriented to person, place, and time. Mental status is at baseline.   Psychiatric:      Comments: Appears anxious          Laboratory:  Recent Labs     12/25/20 0300   WBC 4.9   RBC 4.23   HEMOGLOBIN 12.2   HEMATOCRIT 38.4   MCV 90.8   MCH 28.8   MCHC 31.8*   RDW 48.6   PLATELETCT 174   MPV 11.8     Recent Labs     12/25/20 0300   SODIUM 139   POTASSIUM 3.7   CHLORIDE 111   CO2 16*   GLUCOSE 100*   BUN 5*   CREATININE 0.73   CALCIUM 9.2     Recent Labs     12/25/20  0300   ALTSGPT 26   ASTSGOT 16   ALKPHOSPHAT 69   TBILIRUBIN 0.4   GLUCOSE 100*         No results for input(s): NTPROBNP in the last 72 hours.      Recent Labs     12/25/20  0300   TROPONINT 7       Imaging:  No orders to display         Assessment/Plan:  I anticipate this patient will require at least two midnights for appropriate medical management, necessitating inpatient admission.    Complicated UTI (urinary tract infection)- (present on admission)  Assessment &  Plan  -Failed outpatient therapy  -Will start rocephin 2g qd, allergy reported however patient received a dose in ED and has no reaction to it at this time. Will monitor  -Catheter was recently changed. Will not replace as it does not appear infected  -Pain control (this appears to be chronic pain)  -Repeat urine culture done, follow and de-escalate antibiotics when appropriate    Diarrhea of presumed infectious origin  Assessment & Plan  -Recently on bactrim and reports loose watery diarrhea  -Will place on special contact and send study for cdiff  -Did not start on abx as patient has allergy to flagyl and cannot tolerate PO vanco at this time  -Will await result and start patient on appropriate treatment if warranted    Metabolic acidosis- (present on admission)  Assessment & Plan  -Patient on diamox  -Will take off diamox and follow  -Continue sodium bicarb for now May be able to discontinue if bicarb normalizes    Diabetes mellitus type 2 in obese (HCC)- (present on admission)  Assessment & Plan  -Insulin SS, POCT glucose     Immunocompromised (HCC)- (present on admission)  Assessment & Plan  -on chronic immunosuppressant  -Will continue    Depression- (present on admission)  Assessment & Plan  -Continue home meds

## 2020-12-25 NOTE — ED NOTES
Attending Hospitalist is EDD Goel with Dr Grant starting at 0700. Please contact this APRN for orders, updates or questions today.

## 2020-12-25 NOTE — PROGRESS NOTES
Ms. Balderrama is a 30-year-old female with PMH significant for unspecified/unidentified urinary incontinence ultimately requiring suprapubic placement, ESBL UTI, morbid obesity, DM 2, anxiety and depression who was admitted earlier this morning with nausea/vomiting/diarrhea.  He was recently seen at [facility] and diagnosed with UTI.  Discharged home on Bactrim.  She states she had been compliant with the Bactrim but ultimately developed nausea and vomiting making it difficult for her to take p.o. fluids/food.    Last urine culture from October grew Klebsiella sensitive to Rocephin.    Labs and imaging reviewed: CBC unremarkable without leukocytosis, CMP unremarkable.  UA with large occult blood, 100 protein, positive nitrate, small leuk esterase, many bacteria and packed WBC.  Continue ceftriaxone    She reports her diarrhea has improved.  We will check for C. difficile since she has been on antibiotics recently. IV fluid hydration.  Her main complaint is ongoing pain. She did get some relief with Toradol in the ED, therefore I will schedule this every 6 hours. She is still complaining of nausea making it difficult for her to take p.o. and is hesitant to take p.o. narcotics.  I will order low-dose IV morphine for now.  We discussed we will wean her off IV narcotics once she is tolerating p.o. and she is agreeable to this plan.    Please note that this dictation was created using voice recognition software. I have made every reasonable attempt to correct obvious errors, but there may be errors of grammar and possibly content that I did not discover before finalizing the note.  PER Carrera.

## 2020-12-25 NOTE — ASSESSMENT & PLAN NOTE
Failed outpatient therapy  klebsiella ESBL.  ID consulted, appreciate recs. Switched to meropenem. PICC placed for better access.   Renal US normal, but presumed pyelonephritis given her flank pain  Plan for meropenem 5 days after cath was exchanged, I will clarify with nursing

## 2020-12-26 ENCOUNTER — APPOINTMENT (OUTPATIENT)
Dept: RADIOLOGY | Facility: MEDICAL CENTER | Age: 30
DRG: 699 | End: 2020-12-26
Attending: STUDENT IN AN ORGANIZED HEALTH CARE EDUCATION/TRAINING PROGRAM
Payer: MEDICARE

## 2020-12-26 LAB
ANION GAP SERPL CALC-SCNC: 13 MMOL/L (ref 7–16)
B-OH-BUTYR SERPL-MCNC: <0.2 MMOL/L (ref 0.02–0.27)
BASOPHILS # BLD AUTO: 0.8 % (ref 0–1.8)
BASOPHILS # BLD: 0.03 K/UL (ref 0–0.12)
BUN SERPL-MCNC: 6 MG/DL (ref 8–22)
C DIFF DNA SPEC QL NAA+PROBE: NEGATIVE
C DIFF TOX GENS STL QL NAA+PROBE: NEGATIVE
CALCIUM SERPL-MCNC: 8.6 MG/DL (ref 8.5–10.5)
CHLORIDE SERPL-SCNC: 112 MMOL/L (ref 96–112)
CO2 SERPL-SCNC: 13 MMOL/L (ref 20–33)
CREAT SERPL-MCNC: 0.6 MG/DL (ref 0.5–1.4)
EKG IMPRESSION: NORMAL
EOSINOPHIL # BLD AUTO: 0.14 K/UL (ref 0–0.51)
EOSINOPHIL NFR BLD: 3.7 % (ref 0–6.9)
ERYTHROCYTE [DISTWIDTH] IN BLOOD BY AUTOMATED COUNT: 50.3 FL (ref 35.9–50)
GLUCOSE BLD-MCNC: 85 MG/DL (ref 65–99)
GLUCOSE SERPL-MCNC: 84 MG/DL (ref 65–99)
HCT VFR BLD AUTO: 35.6 % (ref 37–47)
HGB BLD-MCNC: 11.2 G/DL (ref 12–16)
IMM GRANULOCYTES # BLD AUTO: 0.03 K/UL (ref 0–0.11)
IMM GRANULOCYTES NFR BLD AUTO: 0.8 % (ref 0–0.9)
LYMPHOCYTES # BLD AUTO: 1.49 K/UL (ref 1–4.8)
LYMPHOCYTES NFR BLD: 39.6 % (ref 22–41)
MAGNESIUM SERPL-MCNC: 1.8 MG/DL (ref 1.5–2.5)
MCH RBC QN AUTO: 28.9 PG (ref 27–33)
MCHC RBC AUTO-ENTMCNC: 31.5 G/DL (ref 33.6–35)
MCV RBC AUTO: 91.8 FL (ref 81.4–97.8)
MONOCYTES # BLD AUTO: 0.27 K/UL (ref 0–0.85)
MONOCYTES NFR BLD AUTO: 7.2 % (ref 0–13.4)
NEUTROPHILS # BLD AUTO: 1.8 K/UL (ref 2–7.15)
NEUTROPHILS NFR BLD: 47.9 % (ref 44–72)
NRBC # BLD AUTO: 0 K/UL
NRBC BLD-RTO: 0 /100 WBC
PHOSPHATE SERPL-MCNC: 2.8 MG/DL (ref 2.5–4.5)
PLATELET # BLD AUTO: 149 K/UL (ref 164–446)
PMV BLD AUTO: 11.8 FL (ref 9–12.9)
POTASSIUM SERPL-SCNC: 3.9 MMOL/L (ref 3.6–5.5)
RBC # BLD AUTO: 3.88 M/UL (ref 4.2–5.4)
SODIUM SERPL-SCNC: 138 MMOL/L (ref 135–145)
WBC # BLD AUTO: 3.8 K/UL (ref 4.8–10.8)

## 2020-12-26 PROCEDURE — 85025 COMPLETE CBC W/AUTO DIFF WBC: CPT

## 2020-12-26 PROCEDURE — 84100 ASSAY OF PHOSPHORUS: CPT

## 2020-12-26 PROCEDURE — 82962 GLUCOSE BLOOD TEST: CPT

## 2020-12-26 PROCEDURE — 05HF33Z INSERTION OF INFUSION DEVICE INTO LEFT CEPHALIC VEIN, PERCUTANEOUS APPROACH: ICD-10-PCS | Performed by: INTERNAL MEDICINE

## 2020-12-26 PROCEDURE — 80048 BASIC METABOLIC PNL TOTAL CA: CPT

## 2020-12-26 PROCEDURE — B548ZZA ULTRASONOGRAPHY OF SUPERIOR VENA CAVA, GUIDANCE: ICD-10-PCS | Performed by: INTERNAL MEDICINE

## 2020-12-26 PROCEDURE — 700102 HCHG RX REV CODE 250 W/ 637 OVERRIDE(OP): Performed by: INTERNAL MEDICINE

## 2020-12-26 PROCEDURE — 700111 HCHG RX REV CODE 636 W/ 250 OVERRIDE (IP): Performed by: INTERNAL MEDICINE

## 2020-12-26 PROCEDURE — 99233 SBSQ HOSP IP/OBS HIGH 50: CPT | Performed by: INTERNAL MEDICINE

## 2020-12-26 PROCEDURE — A9270 NON-COVERED ITEM OR SERVICE: HCPCS | Performed by: INTERNAL MEDICINE

## 2020-12-26 PROCEDURE — 87493 C DIFF AMPLIFIED PROBE: CPT

## 2020-12-26 PROCEDURE — 82010 KETONE BODYS QUAN: CPT

## 2020-12-26 PROCEDURE — 700111 HCHG RX REV CODE 636 W/ 250 OVERRIDE (IP): Performed by: NURSE PRACTITIONER

## 2020-12-26 PROCEDURE — 83735 ASSAY OF MAGNESIUM: CPT

## 2020-12-26 PROCEDURE — 93010 ELECTROCARDIOGRAM REPORT: CPT | Performed by: INTERNAL MEDICINE

## 2020-12-26 PROCEDURE — 770021 HCHG ROOM/CARE - ISO PRIVATE

## 2020-12-26 PROCEDURE — 02HV33Z INSERTION OF INFUSION DEVICE INTO SUPERIOR VENA CAVA, PERCUTANEOUS APPROACH: ICD-10-PCS | Performed by: INTERNAL MEDICINE

## 2020-12-26 PROCEDURE — 700105 HCHG RX REV CODE 258: Performed by: INTERNAL MEDICINE

## 2020-12-26 PROCEDURE — 36415 COLL VENOUS BLD VENIPUNCTURE: CPT

## 2020-12-26 PROCEDURE — 700105 HCHG RX REV CODE 258: Performed by: NURSE PRACTITIONER

## 2020-12-26 RX ORDER — ACETAZOLAMIDE 500 MG/1
500 CAPSULE, EXTENDED RELEASE ORAL 2 TIMES DAILY
Status: DISCONTINUED | OUTPATIENT
Start: 2020-12-26 | End: 2020-12-30 | Stop reason: HOSPADM

## 2020-12-26 RX ORDER — LOPERAMIDE HYDROCHLORIDE 2 MG/1
2 CAPSULE ORAL 4 TIMES DAILY PRN
Status: DISCONTINUED | OUTPATIENT
Start: 2020-12-26 | End: 2020-12-27

## 2020-12-26 RX ORDER — PHENAZOPYRIDINE HYDROCHLORIDE 100 MG/1
100 TABLET, FILM COATED ORAL
Status: COMPLETED | OUTPATIENT
Start: 2020-12-26 | End: 2020-12-28

## 2020-12-26 RX ADMIN — MORPHINE SULFATE 2 MG: 4 INJECTION INTRAVENOUS at 19:46

## 2020-12-26 RX ADMIN — LOPERAMIDE HYDROCHLORIDE 2 MG: 2 CAPSULE ORAL at 19:12

## 2020-12-26 RX ADMIN — ASPIRIN 81 MG: 81 TABLET, COATED ORAL at 04:57

## 2020-12-26 RX ADMIN — POTASSIUM CHLORIDE 40 MEQ: 1500 TABLET, EXTENDED RELEASE ORAL at 06:42

## 2020-12-26 RX ADMIN — IVABRADINE 7.5 MG: 7.5 TABLET, FILM COATED ORAL at 17:41

## 2020-12-26 RX ADMIN — SODIUM BICARBONATE 650 MG: 650 TABLET ORAL at 17:41

## 2020-12-26 RX ADMIN — PHENAZOPYRIDINE HYDROCHLORIDE 100 MG: 100 TABLET ORAL at 17:42

## 2020-12-26 RX ADMIN — OXYBUTYNIN CHLORIDE 5 MG: 5 TABLET ORAL at 12:41

## 2020-12-26 RX ADMIN — MORPHINE SULFATE 2 MG: 4 INJECTION INTRAVENOUS at 15:56

## 2020-12-26 RX ADMIN — OXCARBAZEPINE 300 MG: 300 TABLET, FILM COATED ORAL at 17:41

## 2020-12-26 RX ADMIN — MORPHINE SULFATE 2 MG: 4 INJECTION INTRAVENOUS at 01:04

## 2020-12-26 RX ADMIN — LEVOTHYROXINE SODIUM 75 MCG: 0.07 TABLET ORAL at 04:58

## 2020-12-26 RX ADMIN — MYCOPHENOLATE MOFETIL 500 MG: 250 CAPSULE ORAL at 04:57

## 2020-12-26 RX ADMIN — KETOROLAC TROMETHAMINE 30 MG: 30 INJECTION, SOLUTION INTRAMUSCULAR at 17:42

## 2020-12-26 RX ADMIN — TOPIRAMATE 50 MG: 25 TABLET, FILM COATED ORAL at 04:58

## 2020-12-26 RX ADMIN — TOPIRAMATE 50 MG: 25 TABLET, FILM COATED ORAL at 17:41

## 2020-12-26 RX ADMIN — OXYBUTYNIN CHLORIDE 5 MG: 5 TABLET ORAL at 04:57

## 2020-12-26 RX ADMIN — IVABRADINE 7.5 MG: 7.5 TABLET, FILM COATED ORAL at 08:15

## 2020-12-26 RX ADMIN — MYCOPHENOLATE MOFETIL 500 MG: 250 CAPSULE ORAL at 17:39

## 2020-12-26 RX ADMIN — KETOROLAC TROMETHAMINE 30 MG: 30 INJECTION, SOLUTION INTRAMUSCULAR at 04:59

## 2020-12-26 RX ADMIN — BUSPIRONE HYDROCHLORIDE 15 MG: 10 TABLET ORAL at 12:41

## 2020-12-26 RX ADMIN — ACETAZOLAMIDE 500 MG: 500 CAPSULE, EXTENDED RELEASE ORAL at 17:39

## 2020-12-26 RX ADMIN — FLUOXETINE 40 MG: 20 CAPSULE ORAL at 04:55

## 2020-12-26 RX ADMIN — BUSPIRONE HYDROCHLORIDE 15 MG: 10 TABLET ORAL at 17:40

## 2020-12-26 RX ADMIN — POTASSIUM CHLORIDE 40 MEQ: 1500 TABLET, EXTENDED RELEASE ORAL at 17:40

## 2020-12-26 RX ADMIN — METHOCARBAMOL TABLETS 750 MG: 750 TABLET, COATED ORAL at 19:47

## 2020-12-26 RX ADMIN — CEFTRIAXONE SODIUM 2 G: 2 INJECTION, POWDER, FOR SOLUTION INTRAMUSCULAR; INTRAVENOUS at 03:14

## 2020-12-26 RX ADMIN — OXYBUTYNIN CHLORIDE 5 MG: 5 TABLET ORAL at 17:42

## 2020-12-26 RX ADMIN — ZIPRASIDONE HYDROCHLORIDE 40 MG: 40 CAPSULE ORAL at 06:42

## 2020-12-26 RX ADMIN — GABAPENTIN 300 MG: 300 CAPSULE ORAL at 17:40

## 2020-12-26 RX ADMIN — BUSPIRONE HYDROCHLORIDE 15 MG: 10 TABLET ORAL at 04:56

## 2020-12-26 RX ADMIN — MORPHINE SULFATE 2 MG: 4 INJECTION INTRAVENOUS at 06:37

## 2020-12-26 RX ADMIN — TRAZODONE HYDROCHLORIDE 100 MG: 50 TABLET ORAL at 17:42

## 2020-12-26 RX ADMIN — OXCARBAZEPINE 300 MG: 300 TABLET, FILM COATED ORAL at 04:56

## 2020-12-26 RX ADMIN — ZIPRASIDONE HYDROCHLORIDE 40 MG: 40 CAPSULE ORAL at 17:39

## 2020-12-26 RX ADMIN — ENOXAPARIN SODIUM 40 MG: 40 INJECTION SUBCUTANEOUS at 17:42

## 2020-12-26 RX ADMIN — SODIUM CHLORIDE: 9 INJECTION, SOLUTION INTRAVENOUS at 03:14

## 2020-12-26 RX ADMIN — SODIUM BICARBONATE 650 MG: 650 TABLET ORAL at 04:58

## 2020-12-26 RX ADMIN — OMEPRAZOLE 20 MG: 20 CAPSULE, DELAYED RELEASE ORAL at 04:58

## 2020-12-26 RX ADMIN — MORPHINE SULFATE 2 MG: 4 INJECTION INTRAVENOUS at 10:36

## 2020-12-26 ASSESSMENT — PAIN DESCRIPTION - PAIN TYPE
TYPE: ACUTE PAIN
TYPE: SURGICAL PAIN
TYPE: ACUTE PAIN
TYPE: SURGICAL PAIN
TYPE: SURGICAL PAIN
TYPE: ACUTE PAIN
TYPE: SURGICAL PAIN

## 2020-12-26 ASSESSMENT — ENCOUNTER SYMPTOMS
DIARRHEA: 1
VOMITING: 1
NAUSEA: 1
ABDOMINAL PAIN: 1
FLANK PAIN: 1

## 2020-12-26 NOTE — PROGRESS NOTES
0821  Pt states she takes Diamox 500mg BID. Not ordered at this time. CDiff sample sent and pending.     1200  Cdiff negative. Will need to clarify with infection control if pt still needs to be in contact iso for history of esbl.    1800  Midline ordered d/t multiple IV attempts and infiltrated IVs. Currently pt has 22 left forearm that is positional.

## 2020-12-26 NOTE — HOSPITAL COURSE
30 y.o. female who presented 12/25/2020 with abdominal pain, nausea, vomiting, fever, chills. Patient was recently seen in [place] [id number] and [facility] where she was diagnosed with UTI and dischraged home on Bactrim. Patient has longstanding suprapubic jraa catheter that was recently changed. She reports compliance to bactrim but developed nausea and vomiting that she can no longer tolerate PO intake and came to ED. Patient has history of multiple UTI with ESBL. Last positive culture from October grew Klebsiella sensitive to rocephin. Patient states that she feels febrile as well though no objective documentation of this. She also reports that since the antibiotics started, she's been having loose diarrhea.

## 2020-12-26 NOTE — PROGRESS NOTES
Pain, redness and scant drainage around suprapubic catheter site. Patient rports feeling leakig from catheter site while urinating.  Site and periarea cleansed with soap and water.

## 2020-12-26 NOTE — PROGRESS NOTES
Hospital Medicine Daily Progress Note    Date of Service  12/26/2020    Chief Complaint  30 y.o. female admitted 12/25/2020 with UTI    Hospital Course  30 y.o. female who presented 12/25/2020 with abdominal pain, nausea, vomiting, fever, chills. Patient was recently seen in [place] [address] and [facility] where she was diagnosed with UTI and dischraged home on Bactrim. Patient has longstanding suprapubic jara catheter that was recently changed. She reports compliance to bactrim but developed nausea and vomiting that she can no longer tolerate PO intake and came to ED. Patient has history of multiple UTI with ESBL. Last positive culture from October grew Klebsiella sensitive to rocephin. Patient states that she feels febrile as well though no objective documentation of this. She also reports that since the antibiotics started, she's been having loose diarrhea.       Interval Problem Update  C. Diff negative. Urine Cx consistent with e.coli. On ceftriaxone.  Blood cultures w NGTD.  Patient complains of bladder pain, nausea.    Consultants/Specialty  none    Code Status  Full Code    Disposition  Continue inpatient    Review of Systems  Review of Systems   Gastrointestinal: Positive for abdominal pain, diarrhea, nausea and vomiting.   Genitourinary: Positive for dysuria and flank pain.   All other systems reviewed and are negative.       Physical Exam  Temp:  [36 °C (96.8 °F)-36.2 °C (97.1 °F)] 36.2 °C (97.1 °F)  Pulse:  [63-83] 63  Resp:  [15-18] 18  BP: ()/(61-79) 103/63  SpO2:  [92 %-99 %] 94 %    Physical Exam  Constitutional:       Appearance: She is obese. She is ill-appearing.   HENT:      Head: Normocephalic and atraumatic.      Right Ear: External ear normal.      Left Ear: External ear normal.      Nose: Nose normal.      Mouth/Throat:      Mouth: Mucous membranes are moist.      Pharynx: Oropharynx is clear.   Eyes:      General: No scleral icterus.     Conjunctiva/sclera: Conjunctivae normal.    Neck:      Musculoskeletal: Normal range of motion and neck supple.   Cardiovascular:      Rate and Rhythm: Normal rate and regular rhythm.   Pulmonary:      Effort: Pulmonary effort is normal.      Breath sounds: Normal breath sounds.   Abdominal:      General: There is no distension.      Palpations: Abdomen is soft.      Tenderness: There is abdominal tenderness. There is no guarding.      Comments: TTP around suprapubic catheter site. Light fibrinous exudate. Catheter draining garcia urine.   Musculoskeletal:         General: No swelling or signs of injury.   Skin:     General: Skin is warm and dry.   Neurological:      General: No focal deficit present.      Mental Status: She is alert and oriented to person, place, and time.   Psychiatric:         Mood and Affect: Mood is anxious. Affect is tearful.         Behavior: Behavior normal.         Thought Content: Thought content normal.         Cognition and Memory: Cognition normal.         Fluids    Intake/Output Summary (Last 24 hours) at 12/26/2020 1407  Last data filed at 12/26/2020 1141  Gross per 24 hour   Intake 400 ml   Output 1990 ml   Net -1590 ml       Laboratory  Recent Labs     12/25/20  0300 12/26/20  0237   WBC 4.9 3.8*   RBC 4.23 3.88*   HEMOGLOBIN 12.2 11.2*   HEMATOCRIT 38.4 35.6*   MCV 90.8 91.8   MCH 28.8 28.9   MCHC 31.8* 31.5*   RDW 48.6 50.3*   PLATELETCT 174 149*   MPV 11.8 11.8     Recent Labs     12/25/20  0300 12/26/20  0237   SODIUM 139 138   POTASSIUM 3.7 3.9   CHLORIDE 111 112   CO2 16* 13*   GLUCOSE 100* 84   BUN 5* 6*   CREATININE 0.73 0.60   CALCIUM 9.2 8.6                   Imaging  IR-US GUIDED PIV    (Results Pending)        Assessment/Plan  Complicated UTI (urinary tract infection)- (present on admission)  Assessment & Plan  Failed outpatient therapy. Catheter was recently changed.   Continue rocephin 2g qd, allergy reported however patient has no reaction to it at this time. Will monitor.   Continue pain control as ordered.  Add phenazopyridine.  Follow up urine culture. So far, consistent with E.coli. Recent hospitalizations has grown klebsiella ESBL.    Diarrhea of presumed infectious origin- (present on admission)  Assessment & Plan  Recently on bactrim and reports loose watery diarrhea. Electrolytes stable.  C.diff negative.  Loperamide prn.    Metabolic acidosis- (present on admission)  Assessment & Plan  Lactate wnl.   Check beta-hydroxybutyrate.  Restart diamox.  Monitor.    Diabetes mellitus type 2 in obese (HCC)- (present on admission)  Assessment & Plan  Recent A1c 5.1.   Insulin SS, POCT glucose, hypoglycemia protocol.    Immunocompromised (HCC)- (present on admission)  Assessment & Plan  Continue home medications.    Depression- (present on admission)  Assessment & Plan  Stable.   Continue home meds.    Anxiety- (present on admission)  Assessment & Plan  Stable.  Continue home meds.       VTE prophylaxis: enoxaparin

## 2020-12-26 NOTE — PROGRESS NOTES
"2115 2mg IV morphine administered for abdominal pain around suprapubic catheter site.    2310 rapid response for chest pain called.   Patient described pain as \"sharp, burning, radiating up neck.,\" and rates pain 8/10.  /71  Pulse 81  Resp 15  Sp02 92% RA    Stat EKG ordered per protocol. Initial EKG in ED showed borderline prolonged QT interval. Per rapid team interpretation, no significant change in new EKG. Patient is on psychiatric medications which may prolong QT interval.      Patient told rapid team that she has Hx of angina. (Cardiac arrhythmia but no angina problem found in non-hospital problem list or H&P).     Gave 30 mg IV toradol as scheduled, and 15mL prn GI cocktail.    "

## 2020-12-26 NOTE — PROGRESS NOTES
Patient requested broth. No procedures are scheduled, and the patient has had no episodes of emesis since arrival..  Per waldemar Lindo to advance diet to clear liquids.   New diet ordered.    Still no BM since prior to arrival, 2200 on 12/24, per patient.

## 2020-12-27 ENCOUNTER — APPOINTMENT (OUTPATIENT)
Dept: RADIOLOGY | Facility: MEDICAL CENTER | Age: 30
DRG: 699 | End: 2020-12-27
Attending: NURSE PRACTITIONER
Payer: MEDICARE

## 2020-12-27 ENCOUNTER — APPOINTMENT (OUTPATIENT)
Dept: RADIOLOGY | Facility: MEDICAL CENTER | Age: 30
DRG: 699 | End: 2020-12-27
Attending: INTERNAL MEDICINE
Payer: MEDICARE

## 2020-12-27 LAB
ANION GAP SERPL CALC-SCNC: 11 MMOL/L (ref 7–16)
BACTERIA UR CULT: ABNORMAL
BACTERIA UR CULT: ABNORMAL
BASOPHILS # BLD AUTO: 0.8 % (ref 0–1.8)
BASOPHILS # BLD: 0.03 K/UL (ref 0–0.12)
BUN SERPL-MCNC: 5 MG/DL (ref 8–22)
CALCIUM SERPL-MCNC: 8.5 MG/DL (ref 8.5–10.5)
CHLORIDE SERPL-SCNC: 114 MMOL/L (ref 96–112)
CO2 SERPL-SCNC: 12 MMOL/L (ref 20–33)
CREAT SERPL-MCNC: 0.53 MG/DL (ref 0.5–1.4)
EOSINOPHIL # BLD AUTO: 0.14 K/UL (ref 0–0.51)
EOSINOPHIL NFR BLD: 3.8 % (ref 0–6.9)
ERYTHROCYTE [DISTWIDTH] IN BLOOD BY AUTOMATED COUNT: 53.1 FL (ref 35.9–50)
GLUCOSE SERPL-MCNC: 88 MG/DL (ref 65–99)
HCT VFR BLD AUTO: 37.7 % (ref 37–47)
HGB BLD-MCNC: 11.5 G/DL (ref 12–16)
IMM GRANULOCYTES # BLD AUTO: 0.02 K/UL (ref 0–0.11)
IMM GRANULOCYTES NFR BLD AUTO: 0.5 % (ref 0–0.9)
LYMPHOCYTES # BLD AUTO: 1.64 K/UL (ref 1–4.8)
LYMPHOCYTES NFR BLD: 44.4 % (ref 22–41)
MAGNESIUM SERPL-MCNC: 1.9 MG/DL (ref 1.5–2.5)
MCH RBC QN AUTO: 29.4 PG (ref 27–33)
MCHC RBC AUTO-ENTMCNC: 30.5 G/DL (ref 33.6–35)
MCV RBC AUTO: 96.4 FL (ref 81.4–97.8)
MONOCYTES # BLD AUTO: 0.27 K/UL (ref 0–0.85)
MONOCYTES NFR BLD AUTO: 7.3 % (ref 0–13.4)
NEUTROPHILS # BLD AUTO: 1.59 K/UL (ref 2–7.15)
NEUTROPHILS NFR BLD: 43.2 % (ref 44–72)
NRBC # BLD AUTO: 0 K/UL
NRBC BLD-RTO: 0 /100 WBC
PLATELET # BLD AUTO: 87 K/UL (ref 164–446)
PMV BLD AUTO: 12.4 FL (ref 9–12.9)
POTASSIUM SERPL-SCNC: 3.7 MMOL/L (ref 3.6–5.5)
RBC # BLD AUTO: 3.91 M/UL (ref 4.2–5.4)
SIGNIFICANT IND 70042: ABNORMAL
SITE SITE: ABNORMAL
SODIUM SERPL-SCNC: 137 MMOL/L (ref 135–145)
SOURCE SOURCE: ABNORMAL
WBC # BLD AUTO: 3.7 K/UL (ref 4.8–10.8)

## 2020-12-27 PROCEDURE — 85025 COMPLETE CBC W/AUTO DIFF WBC: CPT

## 2020-12-27 PROCEDURE — 700102 HCHG RX REV CODE 250 W/ 637 OVERRIDE(OP): Performed by: INTERNAL MEDICINE

## 2020-12-27 PROCEDURE — A9270 NON-COVERED ITEM OR SERVICE: HCPCS | Performed by: INTERNAL MEDICINE

## 2020-12-27 PROCEDURE — 70450 CT HEAD/BRAIN W/O DYE: CPT

## 2020-12-27 PROCEDURE — 700105 HCHG RX REV CODE 258: Performed by: NURSE PRACTITIONER

## 2020-12-27 PROCEDURE — 83735 ASSAY OF MAGNESIUM: CPT

## 2020-12-27 PROCEDURE — 700105 HCHG RX REV CODE 258: Performed by: INTERNAL MEDICINE

## 2020-12-27 PROCEDURE — 99223 1ST HOSP IP/OBS HIGH 75: CPT | Performed by: INTERNAL MEDICINE

## 2020-12-27 PROCEDURE — 700111 HCHG RX REV CODE 636 W/ 250 OVERRIDE (IP): Performed by: INTERNAL MEDICINE

## 2020-12-27 PROCEDURE — 80048 BASIC METABOLIC PNL TOTAL CA: CPT

## 2020-12-27 PROCEDURE — 700111 HCHG RX REV CODE 636 W/ 250 OVERRIDE (IP): Performed by: NURSE PRACTITIONER

## 2020-12-27 PROCEDURE — 99233 SBSQ HOSP IP/OBS HIGH 50: CPT | Performed by: INTERNAL MEDICINE

## 2020-12-27 PROCEDURE — 76937 US GUIDE VASCULAR ACCESS: CPT

## 2020-12-27 PROCEDURE — 36415 COLL VENOUS BLD VENIPUNCTURE: CPT

## 2020-12-27 PROCEDURE — 770021 HCHG ROOM/CARE - ISO PRIVATE

## 2020-12-27 RX ORDER — LOPERAMIDE HYDROCHLORIDE 2 MG/1
4 CAPSULE ORAL 4 TIMES DAILY PRN
Status: DISCONTINUED | OUTPATIENT
Start: 2020-12-27 | End: 2020-12-30 | Stop reason: HOSPADM

## 2020-12-27 RX ORDER — DIPHENHYDRAMINE HCL 25 MG
25 TABLET ORAL EVERY 6 HOURS PRN
Status: DISCONTINUED | OUTPATIENT
Start: 2020-12-27 | End: 2020-12-30 | Stop reason: HOSPADM

## 2020-12-27 RX ORDER — ACETAMINOPHEN 325 MG/1
650 TABLET ORAL EVERY 6 HOURS
Status: DISCONTINUED | OUTPATIENT
Start: 2020-12-27 | End: 2020-12-30 | Stop reason: HOSPADM

## 2020-12-27 RX ORDER — OXYCODONE HYDROCHLORIDE 5 MG/1
5 TABLET ORAL EVERY 4 HOURS PRN
Status: DISCONTINUED | OUTPATIENT
Start: 2020-12-27 | End: 2020-12-30 | Stop reason: HOSPADM

## 2020-12-27 RX ADMIN — ACETAMINOPHEN 650 MG: 325 TABLET ORAL at 23:14

## 2020-12-27 RX ADMIN — MORPHINE SULFATE 2 MG: 4 INJECTION INTRAVENOUS at 11:46

## 2020-12-27 RX ADMIN — SODIUM BICARBONATE 650 MG: 650 TABLET ORAL at 05:11

## 2020-12-27 RX ADMIN — ACETAZOLAMIDE 500 MG: 500 CAPSULE, EXTENDED RELEASE ORAL at 05:10

## 2020-12-27 RX ADMIN — PHENAZOPYRIDINE HYDROCHLORIDE 100 MG: 100 TABLET ORAL at 11:46

## 2020-12-27 RX ADMIN — OXYBUTYNIN CHLORIDE 5 MG: 5 TABLET ORAL at 05:11

## 2020-12-27 RX ADMIN — GABAPENTIN 300 MG: 300 CAPSULE ORAL at 17:54

## 2020-12-27 RX ADMIN — POTASSIUM CHLORIDE 40 MEQ: 1500 TABLET, EXTENDED RELEASE ORAL at 05:10

## 2020-12-27 RX ADMIN — DIPHENHYDRAMINE HYDROCHLORIDE 25 MG: 25 TABLET ORAL at 16:47

## 2020-12-27 RX ADMIN — SODIUM BICARBONATE 650 MG: 650 TABLET ORAL at 17:54

## 2020-12-27 RX ADMIN — KETOROLAC TROMETHAMINE 30 MG: 30 INJECTION, SOLUTION INTRAMUSCULAR at 01:28

## 2020-12-27 RX ADMIN — KETOROLAC TROMETHAMINE 30 MG: 30 INJECTION, SOLUTION INTRAMUSCULAR at 11:46

## 2020-12-27 RX ADMIN — TOPIRAMATE 50 MG: 25 TABLET, FILM COATED ORAL at 05:11

## 2020-12-27 RX ADMIN — BUSPIRONE HYDROCHLORIDE 15 MG: 10 TABLET ORAL at 05:12

## 2020-12-27 RX ADMIN — OXYBUTYNIN CHLORIDE 5 MG: 5 TABLET ORAL at 17:54

## 2020-12-27 RX ADMIN — KETOROLAC TROMETHAMINE 30 MG: 30 INJECTION, SOLUTION INTRAMUSCULAR at 17:48

## 2020-12-27 RX ADMIN — BUSPIRONE HYDROCHLORIDE 15 MG: 10 TABLET ORAL at 17:52

## 2020-12-27 RX ADMIN — TOPIRAMATE 50 MG: 25 TABLET, FILM COATED ORAL at 17:54

## 2020-12-27 RX ADMIN — OXYCODONE 5 MG: 5 TABLET ORAL at 15:51

## 2020-12-27 RX ADMIN — IVABRADINE 7.5 MG: 7.5 TABLET, FILM COATED ORAL at 17:53

## 2020-12-27 RX ADMIN — MORPHINE SULFATE 2 MG: 4 INJECTION INTRAVENOUS at 08:32

## 2020-12-27 RX ADMIN — OXYBUTYNIN CHLORIDE 5 MG: 5 TABLET ORAL at 11:45

## 2020-12-27 RX ADMIN — PHENAZOPYRIDINE HYDROCHLORIDE 100 MG: 100 TABLET ORAL at 08:48

## 2020-12-27 RX ADMIN — ACETAMINOPHEN 650 MG: 325 TABLET ORAL at 17:52

## 2020-12-27 RX ADMIN — MEROPENEM 500 MG: 500 INJECTION, POWDER, FOR SOLUTION INTRAVENOUS at 11:45

## 2020-12-27 RX ADMIN — LOPERAMIDE HYDROCHLORIDE 4 MG: 2 CAPSULE ORAL at 16:48

## 2020-12-27 RX ADMIN — CEFTRIAXONE SODIUM 2 G: 2 INJECTION, POWDER, FOR SOLUTION INTRAMUSCULAR; INTRAVENOUS at 03:20

## 2020-12-27 RX ADMIN — POTASSIUM CHLORIDE 40 MEQ: 1500 TABLET, EXTENDED RELEASE ORAL at 17:53

## 2020-12-27 RX ADMIN — MEROPENEM 500 MG: 500 INJECTION, POWDER, FOR SOLUTION INTRAVENOUS at 23:14

## 2020-12-27 RX ADMIN — ASPIRIN 81 MG: 81 TABLET, COATED ORAL at 05:10

## 2020-12-27 RX ADMIN — OMEPRAZOLE 20 MG: 20 CAPSULE, DELAYED RELEASE ORAL at 05:11

## 2020-12-27 RX ADMIN — TRAZODONE HYDROCHLORIDE 100 MG: 50 TABLET ORAL at 17:54

## 2020-12-27 RX ADMIN — ACETAZOLAMIDE 500 MG: 500 CAPSULE, EXTENDED RELEASE ORAL at 17:49

## 2020-12-27 RX ADMIN — OXCARBAZEPINE 300 MG: 300 TABLET, FILM COATED ORAL at 05:11

## 2020-12-27 RX ADMIN — METHOCARBAMOL TABLETS 750 MG: 750 TABLET, COATED ORAL at 20:58

## 2020-12-27 RX ADMIN — SODIUM CHLORIDE: 9 INJECTION, SOLUTION INTRAVENOUS at 05:27

## 2020-12-27 RX ADMIN — MYCOPHENOLATE MOFETIL 500 MG: 250 CAPSULE ORAL at 17:53

## 2020-12-27 RX ADMIN — BUSPIRONE HYDROCHLORIDE 15 MG: 10 TABLET ORAL at 11:45

## 2020-12-27 RX ADMIN — PHENAZOPYRIDINE HYDROCHLORIDE 100 MG: 100 TABLET ORAL at 17:53

## 2020-12-27 RX ADMIN — LEVOTHYROXINE SODIUM 75 MCG: 0.07 TABLET ORAL at 05:11

## 2020-12-27 RX ADMIN — FLUOXETINE 40 MG: 20 CAPSULE ORAL at 05:10

## 2020-12-27 RX ADMIN — ZIPRASIDONE HYDROCHLORIDE 40 MG: 40 CAPSULE ORAL at 05:10

## 2020-12-27 RX ADMIN — MYCOPHENOLATE MOFETIL 500 MG: 250 CAPSULE ORAL at 05:10

## 2020-12-27 RX ADMIN — MEROPENEM 500 MG: 500 INJECTION, POWDER, FOR SOLUTION INTRAVENOUS at 17:48

## 2020-12-27 RX ADMIN — IVABRADINE 7.5 MG: 7.5 TABLET, FILM COATED ORAL at 08:48

## 2020-12-27 RX ADMIN — MORPHINE SULFATE 2 MG: 4 INJECTION INTRAVENOUS at 19:28

## 2020-12-27 RX ADMIN — ENOXAPARIN SODIUM 40 MG: 40 INJECTION SUBCUTANEOUS at 05:12

## 2020-12-27 RX ADMIN — ZIPRASIDONE HYDROCHLORIDE 40 MG: 40 CAPSULE ORAL at 17:53

## 2020-12-27 RX ADMIN — SODIUM CHLORIDE: 9 INJECTION, SOLUTION INTRAVENOUS at 17:57

## 2020-12-27 RX ADMIN — OXCARBAZEPINE 300 MG: 300 TABLET, FILM COATED ORAL at 17:54

## 2020-12-27 RX ADMIN — KETOROLAC TROMETHAMINE 30 MG: 30 INJECTION, SOLUTION INTRAMUSCULAR at 05:13

## 2020-12-27 RX ADMIN — KETOROLAC TROMETHAMINE 30 MG: 30 INJECTION, SOLUTION INTRAMUSCULAR at 23:14

## 2020-12-27 ASSESSMENT — ENCOUNTER SYMPTOMS
ABDOMINAL PAIN: 1
VOMITING: 1
FLANK PAIN: 1
DIARRHEA: 1
NAUSEA: 1

## 2020-12-27 ASSESSMENT — PAIN DESCRIPTION - PAIN TYPE
TYPE: ACUTE PAIN

## 2020-12-27 NOTE — PROGRESS NOTES
CT of head results shows no abnormalities. Patient still a&o x4, no other neuro changes.  Vital signs stable.

## 2020-12-27 NOTE — SENIOR ADMIT NOTE
"2030 patient, alert and oriented x4,  complains of dizziness. Vital signs rechecked by this RN: /54, pulse 63, resp 20, temp 36.2 C. Explained that dizziness is likely due to IV morphine affecting blood pressure, and will continue to monitor vital signs.    2300 vitals similar.     0000 Patient complains of headache, visual changes including bright lights when closing eyes and \"tracer\" effects. Still a&o x4. Called pharmacy to ask about possible medication interactions or side effects. Per pharmacy, patient has a recent increase of oxycarbazepine from 150mg to 300 mg. Patient clarified  her psychiatrist made this change around 2 weeks ago.    0100 Patient complaining of increased headache 10/10, and hearing a \"sound like a waterfall\" in the back of her head. Patient reported that this is what she has experienced previously when she had \"too much pressure in my head.\" This RN was not aware of this medical Hx and could not find in chart so contacted Hospitalist Ligia Lundberg with these updates. Hosp ordered CT. No LOC or pupillary changes  Vitals  36 C Temporal  Pulse 62  Resp 20  /52  O2 95% room air     0125 spoke with Bright Stock to clarify need for CT stat.            "

## 2020-12-27 NOTE — CONSULTS
INFECTIOUS DISEASES INPATIENT CONSULT NOTE     Date of Service: 12/27/2020    Consult Requested By: Merced Olson M.D.    Reason for Consultation: Complicated urinary tract infection with ESBL Klebsiella oxytoca    History of Present Illness:   Kristin Balderrama is a 30 y.o. morbidly obese woman with a history of psychiatric disorders, optic neuritis on CellCept, chronic suprapubic catheter since August 2020 secondary to urinary retention followed by [facility], complicated by prior urinary tract infections, type 2 diabetes mellitus and multiple antibiotic allergies admitted 12/25/2020 for nausea, vomiting and diarrhea.  Patient was recently hospitalized at [facility] approximately 2 weeks prior to admission where she has been diagnosed with a urinary tract infection. She was recently seen in the ER on 12/21 secondary to suprapubic catheter not draining. Urinalysis at that time revealed positive nitrite, packed WBCs and many bacteria. She was discharged home on Bactrim. Return to the ED the following day secondary to abdominal pain and bilateral flank pain with bloody output from catheter. Her catheter was reportedly draining well at that time and she remains on antibiotics and was discharged home. Unfortunately, patient returned to the ED on 12/25 secondary to worsening nausea, vomiting and abdominal pain. Patient states that when she was recently diagnosed with a urinary tract infection she developed pain around her suprapubic catheter site associated with foul-smelling urine, flank pain and chills. After she started taking Bactrim, her nausea, vomiting and abdominal pain worsen. Such, she presented to the ED for further evaluation and management. Patient states that her suprapubic catheter was last changed on Monday prior to admission at [facility]. After her catheter was changed, she noted some scant bloody urine but none since. On presentation, she was afebrile with a normal  "white count. She was started empirically on ceftriaxone. A urinalysis was not obtained but a urine culture is now growing ESBL Klebsiella pneumoniae. Blood cultures on admission are negative to date. Infectious disease service consulted for antibiotic recommendations given multiple antibiotic allergies.    Of note, patient has a history of multiple urinary tract infections with ESBL Klebsiella pneumonia in October, and ESBL Klebsiella oxytoca and September.     All other review of systems reviewed and negative except those documented above in the HPI.     PMH:   Past Medical History:   Diagnosis Date   • Abdominal pain    • Anginal syndrome     random chest pain especially with tachycardia   • Apnea, sleep    • Arrhythmia     \"sinus tachycardia\", cariologist, Dr. Kumar; ablation 2/2016   • Arthritis     osteo   • ASTHMA     when around smoke   • Atrial fibrillation (HCC)    • Back pain    • Borderline personality disorder (HCC)    • Breath shortness     with tachycardia   • Bronchitis    • Cardiac arrhythmia    • Chickenpox    • Chronic UTI 9/18/2010   • Cough    • Daytime sleepiness    • Depression    • Diabetes (HCC)    • Diarrhea    • Disorder of thyroid    • Fall    • Fatigue    • Frequent headaches    • Gasping for breath    • Gynecological disorder     PCOS   • Headache(784.0)    • Heart burn    • History of falling    • Hypertension    • Indigestion    • Migraine    • 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HCC)    • 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HCC)    • Nausea    • Obesity    • Other fatigue 6/29/2020   • Pain 08-15-12    back, 7/10   • Painful joint    • PCOS (polycystic ovarian syndrome)    • Pneumonia 2012   • Psychosis (HCC)    • Renal disorder     \"kidney disease, stage 1\" nephrologist, Dr. Vallejo   • Ringing in ears    • Scoliosis    • Shortness of breath    • Sinus tachycardia 10/31/2013   • Sleep apnea     CPAP \"pulmonary doctor took me off mid year 2016\"   • Snoring    • Tonsillitis    • 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HCC)  "   • Tuberculosis     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   • Urinary bladder disorder     Suprapubic cath   • Urinary incontinence    • Weakness    • Wears glasses        PSH:  Past Surgical History:   Procedure Laterality Date   • MUSCLE BIOPSY Right 1/26/2017    Procedure: MUSCLE BIOPSY - THIGH;  Surgeon: Isidro Vigil M.D.;  Location: SURGERY [facility];  Service:    • GASTROSCOPY WITH BALLOON DILATATION N/A 1/4/2017    Procedure: GASTROSCOPY WITH DILATATION;  Surgeon: Torres Vargas M.D.;  Location: SURGERY [facility];  Service:    • BOWEL STIMULATOR INSERTION  7/13/2016    Procedure: BOWEL STIMULATOR INSERTION FOR PERMANENT INTERSTIM SACRAL IMPLANT;  Surgeon: Joe Noyola M.D.;  Location: SURGERY [facility];  Service:    • RECOVERY  1/27/2016    Procedure: CATH LAB EP STUDY WITH SINUS NODE MODIFICATION LA;  Surgeon: Recoveryonly Surgery;  [place] [facility] [place]  Service:    • OTHER CARDIAC SURGERY  1/2016    cardiac ablation   • NEURO DEST FACET L/S W/IG SNGL  4/21/2015    Performed by Reza Tabor at SURGERY [facility]   • LUMBAR FUSION ANTERIOR  8/21/2012    Performed by SUSIE SAWANT at SURGERY [facility]   • ALYSSA BY LAPAROSCOPY  8/29/2010    Performed by SHAYY JOHNS at SURGERY [facility] OR[facility]   • LAMINOTOMY     • OTHER ABDOMINAL SURGERY     • TONSILLECTOMY      tonsillectomy       FAMILY HX:  Family History   Problem Relation Age of Onset   • Hypertension Mother    • Sleep Apnea Mother    • Heart Disease Mother    • Other Mother         hypothryod   • Hypertension Maternal Uncle    • Heart Disease Maternal Grandmother    • Hypertension Maternal Grandmother    • No Known Problems Sister    • Other Sister         Narcolepsy;fibromyalsia;bone;nerve   • Genitourinary () Problems Sister         endometriosis       SOCIAL HX:  Social History     Socioeconomic History   • Marital status: Single     Spouse name: Not on file   • Number of  children: Not on file   • Years of education: Not on file   • Highest education level: Not on file   Occupational History   • Not on file   Social Needs   • Financial resource strain: Not hard at all   • Food insecurity     Worry: Never true     Inability: Never true   • Transportation needs     Medical: No     Non-medical: No   Tobacco Use   • Smoking status: Never Smoker   • Smokeless tobacco: Never Used   Substance and Sexual Activity   • Alcohol use: No     Alcohol/week: 0.0 oz     Frequency: Never     Binge frequency: Never   • Drug use: Not Currently     Frequency: 7.0 times per week     Types: Marijuana   • Sexual activity: Not Currently     Birth control/protection: Pill   Lifestyle   • Physical activity     Days per week: Not on file     Minutes per session: Not on file   • Stress: Not on file   Relationships   • Social connections     Talks on phone: Not on file     Gets together: Not on file     Attends Voodoo service: Not on file     Active member of club or organization: Not on file     Attends meetings of clubs or organizations: Not on file     Relationship status: Not on file   • Intimate partner violence     Fear of current or ex partner: Not on file     Emotionally abused: Not on file     Physically abused: Not on file     Forced sexual activity: Not on file   Other Topics Concern   • Not on file   Social History Narrative    ** Merged History Encounter **          Social History     Tobacco Use   Smoking Status Never Smoker   Smokeless Tobacco Never Used     Social History     Substance and Sexual Activity   Alcohol Use No   • Alcohol/week: 0.0 oz   • Frequency: Never   • Binge frequency: Never       Allergies/Intolerances:  Allergies   Allergen Reactions   • Depakote [Divalproex Sodium] Unspecified     Muscle spasms/muscle pain and weakness     • Amitriptyline Unspecified     Headaches     • Aripiprazole [Abilify] Unspecified     Headaches/muscle twitching     • Clindamycin Nausea     Even with  "food     • Flagyl [Metronidazole Hcl] Unspecified     \"eye problems\"     • Flomax [Tamsulosin Hydrochloride] Swelling   • Levaquin Unspecified     Severe muscle cramps in legs  RXN=unknown   • Metformin Unspecified     Increased lactic acid      • Tape Rash     Tears skin off  coban with Tegaderm tape ok intermittently  RXN=ongoing   • Vancomycin Itching     Pt becomes flushed in face and chest.   RXN=7/10/16   • Wound Dressing Adhesive Hives     By pt report   • Ciprofloxacin    • Hydromorphone Hcl      Pt states she feels loopy   • Keflex Rash     Pt states she gets a rash with this medication  Tolerates ceftriaxone   • Levofloxacin Unspecified     Leg muscle cramps   • Metronidazole Rash     .   • Penicillins Hives   • Tamsulosin Swelling   • Valproic Acid Rash     .   Patient is tolerating ceftriaxone    History reviewed with the patient     Other Current Medications:    Current Facility-Administered Medications:   •  acetaZOLAMIDE SR (DIAMOX) capsule 500 mg, 500 mg, Oral, BID, Merced Olson M.D., 500 mg at 12/27/20 0510  •  phenazopyridine (PYRIDIUM) tablet 100 mg, 100 mg, Oral, TID WITH MEALS, Merced Olson M.D., 100 mg at 12/27/20 0848  •  loperamide (IMODIUM) capsule 2 mg, 2 mg, Oral, 4X/DAY PRN, Merced Olson M.D., 2 mg at 12/26/20 1912  •  enoxaparin (LOVENOX) inj 40 mg, 40 mg, Subcutaneous, DAILY, Merced Olson M.D., 40 mg at 12/27/20 0512  •  albuterol inhaler 2 Puff, 2 Puff, Inhalation, Q6HRS PRN, Carlota Murcia M.D.  •  aspirin EC (ECOTRIN) tablet 81 mg, 81 mg, Oral, DAILY, Carlota Murcia M.D., 81 mg at 12/27/20 0510  •  busPIRone (BUSPAR) tablet 15 mg, 15 mg, Oral, TID, Carlota Murcia M.D., 15 mg at 12/27/20 0512  •  FLUoxetine (PROZAC) capsule 40 mg, 40 mg, Oral, DAILY, Carlota Murcia M.D., 40 mg at 12/27/20 0510  •  gabapentin (NEURONTIN) capsule 300 mg, 300 mg, Oral, Q EVENING, Carlota Murcia M.D., 300 mg at 12/26/20 1740  •  ivabradine (Corlanor) tablet 7.5 mg, 7.5 mg, Oral, BID WITH MEALS, Carlota " HAN Murcia M.D., 7.5 mg at 12/27/20 0848  •  levothyroxine (SYNTHROID) tablet 75 mcg, 75 mcg, Oral, AM ES, Carlota Murcia M.D., 75 mcg at 12/27/20 0511  •  methocarbamol (ROBAXIN) tablet 750 mg, 750 mg, Oral, QHS, Carlota Murcia M.D., 750 mg at 12/26/20 1947  •  mycophenolate (CELLCEPT) capsule 500 mg, 500 mg, Oral, BID, Carlota Murcia M.D., 500 mg at 12/27/20 0510  •  omeprazole (PRILOSEC) capsule 20 mg, 20 mg, Oral, DAILY, Carlota Murcia M.D., 20 mg at 12/27/20 0511  •  OXcarbazepine (TRILEPTAL) tablet 300 mg, 300 mg, Oral, BID, Carlota Murcia M.D., 300 mg at 12/27/20 0511  •  oxybutynin (DITROPAN) tablet 5 mg, 5 mg, Oral, TID, Carlota Murcia M.D., 5 mg at 12/27/20 0511  •  potassium chloride SA (Kdur) tablet 40 mEq, 40 mEq, Oral, BID, Carlota Murcia M.D., 40 mEq at 12/27/20 0510  •  sodium bicarbonate tablet 650 mg, 650 mg, Oral, BID, Carlota Murcia M.D., 650 mg at 12/27/20 0511  •  topiramate (TOPAMAX) tablet 50 mg, 50 mg, Oral, BID, Carlota Murcia M.D., 50 mg at 12/27/20 0511  •  traZODone (DESYREL) tablet 100 mg, 100 mg, Oral, Q EVENING, Carlota Murcia M.D., 100 mg at 12/26/20 1742  •  ziprasidone (GEODON) capsule 40 mg, 40 mg, Oral, BID, Carlota Murcia M.D., 40 mg at 12/27/20 0510  •  NS infusion, , Intravenous, Continuous, VANIA Carrera, Last Rate: 100 mL/hr at 12/27/20 0527, New Bag at 12/27/20 0527  •  ketorolac (TORADOL) injection 30 mg, 30 mg, Intravenous, Q6HRS, Amna Goel, A.P.R.N., 30 mg at 12/27/20 0513  •  morphine (pf) 4 mg/mL injection 2 mg, 2 mg, Intravenous, Q3HRS PRN, Amna Goel A.P.R.N., 2 mg at 12/27/20 0832  •  hyoscyamine-maalox plus-lidocaine viscous (GI COCKTAIL) oral susp 15 mL, 15 mL, Oral, Q6HRS PRN, Noman Grant D.O., 15 mL at 12/25/20 2334  •  ipratropium-albuterol (DUONEB) nebulizer solution, 3 mL, Nebulization, Q4H PRN (RT), DEANDRA Carrera.P.R.N.  •  Respiratory Therapy Consult, , Nebulization, Continuous RT, DEANDRA Carrera.P.R.N.  [unfilled]    Most Recent Vital  "Signs:  /76   Pulse 66   Temp 36.2 °C (97.2 °F) (Temporal)   Resp 16   Ht 1.651 m (5' 5\")   Wt 110.7 kg (244 lb)   SpO2 93%   BMI 40.60 kg/m²   Temp  Av.3 °C (97.4 °F)  Min: 35.8 °C (96.5 °F)  Max: 37.5 °C (99.5 °F)    Physical Exam:  General: Morbidly obese, well nourished, no diaphoresis, well-appearing, no acute distress  HEENT: sclera anicteric, PERRL, extraocular muscles intact, mucous membranes moist, oropharynx clear and moist, no oral lesions or exudate  Neck: supple, no lymphadenopathy  Chest: CTAB, no rales, rhonchi or wheezes, normal work of breathing.  Cardiac: regular rate and rhythm, normal S1 S2, no murmurs, rubs or gallops  Abdomen: + bowel sounds, soft, non-tender, non-distended, no hepatosplenomegaly  : Suprapubic catheter in place. There is mild surrounding erythema and some discharge at catheter insertion site. Mild CVA tenderness  Extremities: WWP, no edema, 2+ pedal pulses  Skin: warm and dry, no rashes or worrisome lesions  Neuro: Alert and oriented times 3, non-focal exam, speech fluent, full range of motion to bilateral upper and lower extremities  Psych: normal mood and behavior, pleasant; memory intact, normal judgement    Pertinent Lab Results:  Recent Labs     20  010   WBC 4.9 3.8* 3.7*      Recent Labs     20  03020  02320  010   HEMOGLOBIN 12.2 11.2* 11.5*   HEMATOCRIT 38.4 35.6* 37.7   MCV 90.8 91.8 96.4   MCH 28.8 28.9 29.4   PLATELETCT 174 149* 87*         Recent Labs     20  0300 20  010   SODIUM 139 138 137   POTASSIUM 3.7 3.9 3.7   CHLORIDE 111 112 114*   CO2 16* 13* 12*   CREATININE 0.73 0.60 0.53        Recent Labs     12/25/20  0300   ALBUMIN 4.2        Pertinent Micro:  Results     Procedure Component Value Units Date/Time    URINE CULTURE(NEW) [[id number]]  (Abnormal)  (Susceptibility) Collected: 20 0355    Order Status: Completed Specimen: Urine Updated: " 12/27/20 0724     Significant Indicator POS     Source UR     Site -     Culture Result -      Klebsiella oxytoca ESBL  >100,000 cfu/mL  Extended Spectrum Beta-lactamase (ESBL) isolated.  ESBL's may be clinically resistant to therapy with  Penicillins,Cephalosporins or Aztreonam despite  apparent in vitro susceptibility to some of these agents.  The patient requires contact isolation.  Please contact pharmacy or an Infectious Disease Specialist  if you have any questions about appropriate therapy.      Narrative:      CALL  Reis  131 tel. [phone],  CALLED  131 tel. [phone] 12/27/2020, 07:24, RB PERF. RESULTS CALLED  TO:[id number] RN  Indication for culture:->Patient WITHOUT an indwelling Andrade  catheter in place with new onset of Dysuria, Frequency,  Urgency, and/or Suprapubic pain  Indication for culture:->Patient WITHOUT an indwelling Andrade    Susceptibility     Klebsiella oxytoca esbl (1)     Antibiotic Interpretation Microscan Method Status    Ceftriaxone Extended Spectrum Beta Lactamase >32 mcg/mL TYLER Final    Ceftazidime Extended Spectrum Beta Lactamase 4 mcg/mL TYLER Final    Cefotaxime Extended Spectrum Beta Lactamase 8 mcg/mL TYLER Final    Cefazolin Resistant >16 mcg/mL TYLER Final    Ciprofloxacin Intermediate 2 mcg/mL TYLER Final    Cefepime Resistant 8 mcg/mL TYLER Final    Ampicillin Resistant >16 mcg/mL TYLER Final    Cefotetan Sensitive <=16 mcg/mL TYLER Final    Tobramycin Sensitive <=4 mcg/mL TYLER Final    Nitrofurantoin Resistant >64 mcg/mL TYLER Final    Gentamicin Sensitive <=4 mcg/mL TYLER Final    Levofloxacin Sensitive <=2 mcg/mL TYLER Final    Pip/Tazobactam Resistant >64 mcg/mL TYLER Final    Trimeth/Sulfa Resistant >2/38 mcg/mL TYLER Final                   C Diff by PCR rflx Toxin [[id number]] Collected: 12/26/20 0315    Order Status: Completed Specimen: Stool Updated: 12/26/20 0927     C Diff by PCR Negative     Comment: C. difficile NOT detected by PCR.  Treatment not indicated per guidelines.  Repeat testing  "not indicated within 7 days.          027-NAP1-BI Presumptive Negative     Comment: Presumptive 027/NAP1/BI target DNA sequences are NOT DETECTED.       Narrative:      Special Contact Isolation  Does this patient have risk factors for C-diff?->Yes    BLOOD CULTURE [[id number]] Collected: 12/25/20 0359    Order Status: Completed Specimen: Blood from Peripheral Updated: 12/26/20 0926     Significant Indicator NEG     Source BLD     Site PERIPHERAL     Culture Result No Growth  Note: Blood cultures are incubated for 5 days and  are monitored continuously.Positive blood cultures  are called to the RN and reported as soon as  they are identified.      Narrative:      2 of 2 blood culture x2  Sites order. Per Hospital Policy:  Only change Specimen Src: to \"Line\" if specified by physician  order.  No site indicated    BLOOD CULTURE [[id number]] Collected: 12/25/20 0300    Order Status: Completed Specimen: Blood from Peripheral Updated: 12/26/20 0926     Significant Indicator NEG     Source BLD     Site PERIPHERAL     Culture Result No Growth  Note: Blood cultures are incubated for 5 days and  are monitored continuously.Positive blood cultures  are called to the RN and reported as soon as  they are identified.      Narrative:      1 of 2 for Blood Culture x 2 sites order. Per Hospital  Policy: Only change Specimen Src: to \"Line\" if specified by  physician order.  No site indicated    CoV-2, Flu A/B, And RSV by PCR [[id number]] Collected: 12/25/20 0334    Order Status: Completed Updated: 12/25/20 0453     Influenza virus A RNA Negative     Influenza virus B, PCR Negative     RSV, PCR Negative     SARS-CoV-2 by PCR NotDetected     Comment: PATIENTS: Important information regarding your results and instructions can  be found at [email or web address]   \"After your  Covid-19 Test\"  [facility] providers: PLEASE REFER TO DE-ESCALATION AND RETESTING PROTOCOL  on [email or web address]  **The [facility] GeneXpert Xpress " SARS-CoV-2 Test has been made available for  use under the Emergency Use Authorization (EUA) only.          SARS-CoV-2 Source NP Swab    Narrative:      Is patient being admitted?->Yes  Does this patient meet criteria for Rush/Cepheid per [facility]  Inpatient Workflow? (See workflow link below)->Yes  Expected turn around time?->Rush (Cepheid 2-4 hours)  Have you been in close contact with a person who is suspected  or known to be positive for COVID-19 within the last 30 days  (e.g. last seen that person < 30 days ago)->No    COVID/SARS CoV-2 PCR [[id number]] Collected: 12/25/20 0334    Order Status: Completed Specimen: Respirate from Nasopharyngeal Updated: 12/25/20 0341     COVID Order Status Received     Comment: The order for SARS CoV-2 testing has been received by the  Laboratory. This result is neither positive nor negative.  Final results of testing will report in 24-48 hours, separately.         Narrative:      Is patient being admitted?->Yes  Does this patient meet criteria for Rush/Cepheid per [facility]  Inpatient Workflow? (See workflow link below)->Yes  Expected turn around time?->Rush (Cepheid 2-4 hours)  Have you been in close contact with a person who is suspected  or known to be positive for COVID-19 within the last 30 days  (e.g. last seen that person < 30 days ago)->No        Blood Culture   Date Value Ref Range Status   10/25/2018 No growth after 5 days of incubation.  Final     Blood Culture Hold   Date Value Ref Range Status   05/04/2020 Collected  Final   05/04/2020 Collected  Final        Studies:  Ct-head W/o    Result Date: 12/27/2020 12/27/2020 3:09 AM HISTORY/REASON FOR EXAM:  Dizziness, non-specific. Vision changes, severe headache TECHNIQUE/EXAM DESCRIPTION AND NUMBER OF VIEWS: CT of the head without contrast. The study was performed on a helical multidetector CT scanner. Contiguous 2.5 mm axial sections were obtained from the skull base through the vertex. Up to date radiation dose reduction  adjustments have been utilized to meet ALARA standards for radiation dose reduction. COMPARISON:  10/20/2020 FINDINGS: Ventricles, cisterns and cortical sulci are within normal limits. No acute intracranial hemorrhage, major vascular territory infarct, mass effect, midline shift or hydrocephalus.  Incidental partially empty sella. The visualized paranasal sinuses and mastoids are clear apart from mild mucosal thickening in the maxillary sinuses. No depressed calvarial fracture.     No acute intracranial abnormality.      IMPRESSION:   1.  Complicated urinary tract infection   2.  ESBL Klebsiella pneumoniae infection  3.  Chronic suprapubic catheter  4.  Multiple antibiotic allergies  5.  Morbid obesity      PLAN:   Kristin Balderrama is a 30 y.o. morbidly obese woman with a history of optic neuritis on CellCept, psychiatric disorders, type 2 diabetes mellitus, chronic suprapubic catheter since August 2020 secondary to urinary retention and followed by urology [place], recent ESBL urinary tract infections admitted for worsening nausea, vomiting and abdominal pain in the setting of Bactrim use. Her suprapubic catheter was changed on Monday prior to admission. Her clinical presentation is consistent with a complicated urinary tract infection given her symptoms. Urine culture is now growing ESBL Klebsiella oxytoca. Patient is otherwise hemodynamically stable without any fevers.    -Discontinue ceftriaxone  -Transition to meropenem 500 mg every 6 hours. Anticipate a 7 to 10-day course total. Monitor for any adverse effects of meropenem. Unfortunately, there are no oral antibiotic options available.  -Her suprapubic catheter is likely colonized with ESBL pathogens and should be changed.  -Okay with midline placement as patient with poor IV access and need for IV antibiotics.      Plan of care discussed with ERMA Olson M.D.. Will continue to follow    Lou Calderón M.D.      Please note that this dictation  was created using voice recognition software. I have worked with technical experts from [facility] to optimize the interface.  I have made every reasonable attempt to correct obvious errors, but there may be errors of grammar and possibly content that I did not discover before finalizing the note.

## 2020-12-27 NOTE — PROGRESS NOTES
Attempted to place midline IV catheter on both arms and was unsuccessful.  Patient's veins are deep and this RN was unable to maintain adequate access.  The midline placement procedure was aborted and primary RN updated.  If a PICC line is needed for this patient due to no other IV options for access we may be able to accommodate but VAT would advise against further midline attempts.

## 2020-12-28 ENCOUNTER — APPOINTMENT (OUTPATIENT)
Dept: RADIOLOGY | Facility: MEDICAL CENTER | Age: 30
DRG: 699 | End: 2020-12-28
Attending: INTERNAL MEDICINE
Payer: MEDICARE

## 2020-12-28 LAB
ANION GAP SERPL CALC-SCNC: 10 MMOL/L (ref 7–16)
BASOPHILS # BLD AUTO: 0.6 % (ref 0–1.8)
BASOPHILS # BLD: 0.03 K/UL (ref 0–0.12)
BUN SERPL-MCNC: 4 MG/DL (ref 8–22)
CALCIUM SERPL-MCNC: 8.7 MG/DL (ref 8.5–10.5)
CHLORIDE SERPL-SCNC: 118 MMOL/L (ref 96–112)
CO2 SERPL-SCNC: 13 MMOL/L (ref 20–33)
CREAT SERPL-MCNC: 0.56 MG/DL (ref 0.5–1.4)
EOSINOPHIL # BLD AUTO: 0.18 K/UL (ref 0–0.51)
EOSINOPHIL NFR BLD: 3.7 % (ref 0–6.9)
ERYTHROCYTE [DISTWIDTH] IN BLOOD BY AUTOMATED COUNT: 52.9 FL (ref 35.9–50)
GLUCOSE SERPL-MCNC: 100 MG/DL (ref 65–99)
HCG SERPL QL: NEGATIVE
HCT VFR BLD AUTO: 39.4 % (ref 37–47)
HGB BLD-MCNC: 12 G/DL (ref 12–16)
IMM GRANULOCYTES # BLD AUTO: 0.02 K/UL (ref 0–0.11)
IMM GRANULOCYTES NFR BLD AUTO: 0.4 % (ref 0–0.9)
LYMPHOCYTES # BLD AUTO: 1.49 K/UL (ref 1–4.8)
LYMPHOCYTES NFR BLD: 31 % (ref 22–41)
MAGNESIUM SERPL-MCNC: 1.9 MG/DL (ref 1.5–2.5)
MCH RBC QN AUTO: 29.3 PG (ref 27–33)
MCHC RBC AUTO-ENTMCNC: 30.5 G/DL (ref 33.6–35)
MCV RBC AUTO: 96.1 FL (ref 81.4–97.8)
MONOCYTES # BLD AUTO: 0.31 K/UL (ref 0–0.85)
MONOCYTES NFR BLD AUTO: 6.4 % (ref 0–13.4)
NEUTROPHILS # BLD AUTO: 2.78 K/UL (ref 2–7.15)
NEUTROPHILS NFR BLD: 57.9 % (ref 44–72)
NRBC # BLD AUTO: 0 K/UL
NRBC BLD-RTO: 0 /100 WBC
PLATELET # BLD AUTO: 165 K/UL (ref 164–446)
PMV BLD AUTO: 11.3 FL (ref 9–12.9)
POTASSIUM SERPL-SCNC: 3.9 MMOL/L (ref 3.6–5.5)
RBC # BLD AUTO: 4.1 M/UL (ref 4.2–5.4)
SODIUM SERPL-SCNC: 141 MMOL/L (ref 135–145)
WBC # BLD AUTO: 4.8 K/UL (ref 4.8–10.8)

## 2020-12-28 PROCEDURE — 700105 HCHG RX REV CODE 258: Performed by: INTERNAL MEDICINE

## 2020-12-28 PROCEDURE — 700102 HCHG RX REV CODE 250 W/ 637 OVERRIDE(OP): Performed by: INTERNAL MEDICINE

## 2020-12-28 PROCEDURE — 76775 US EXAM ABDO BACK WALL LIM: CPT

## 2020-12-28 PROCEDURE — 700111 HCHG RX REV CODE 636 W/ 250 OVERRIDE (IP): Performed by: INTERNAL MEDICINE

## 2020-12-28 PROCEDURE — 700105 HCHG RX REV CODE 258: Performed by: NURSE PRACTITIONER

## 2020-12-28 PROCEDURE — A9270 NON-COVERED ITEM OR SERVICE: HCPCS | Performed by: INTERNAL MEDICINE

## 2020-12-28 PROCEDURE — 770021 HCHG ROOM/CARE - ISO PRIVATE

## 2020-12-28 PROCEDURE — 99233 SBSQ HOSP IP/OBS HIGH 50: CPT | Performed by: INTERNAL MEDICINE

## 2020-12-28 PROCEDURE — 85025 COMPLETE CBC W/AUTO DIFF WBC: CPT

## 2020-12-28 PROCEDURE — 84703 CHORIONIC GONADOTROPIN ASSAY: CPT

## 2020-12-28 PROCEDURE — 700111 HCHG RX REV CODE 636 W/ 250 OVERRIDE (IP): Performed by: NURSE PRACTITIONER

## 2020-12-28 PROCEDURE — 36415 COLL VENOUS BLD VENIPUNCTURE: CPT

## 2020-12-28 PROCEDURE — 80048 BASIC METABOLIC PNL TOTAL CA: CPT

## 2020-12-28 PROCEDURE — 83735 ASSAY OF MAGNESIUM: CPT

## 2020-12-28 PROCEDURE — 36573 INSJ PICC RS&I 5 YR+: CPT

## 2020-12-28 RX ADMIN — ACETAMINOPHEN 650 MG: 325 TABLET ORAL at 13:12

## 2020-12-28 RX ADMIN — BUSPIRONE HYDROCHLORIDE 15 MG: 10 TABLET ORAL at 17:08

## 2020-12-28 RX ADMIN — SODIUM CHLORIDE: 9 INJECTION, SOLUTION INTRAVENOUS at 21:00

## 2020-12-28 RX ADMIN — KETOROLAC TROMETHAMINE 30 MG: 30 INJECTION, SOLUTION INTRAMUSCULAR at 17:07

## 2020-12-28 RX ADMIN — SODIUM BICARBONATE 650 MG: 650 TABLET ORAL at 17:07

## 2020-12-28 RX ADMIN — OXYCODONE 5 MG: 5 TABLET ORAL at 21:31

## 2020-12-28 RX ADMIN — MYCOPHENOLATE MOFETIL 500 MG: 250 CAPSULE ORAL at 04:52

## 2020-12-28 RX ADMIN — IVABRADINE 7.5 MG: 7.5 TABLET, FILM COATED ORAL at 08:43

## 2020-12-28 RX ADMIN — MORPHINE SULFATE 2 MG: 4 INJECTION INTRAVENOUS at 04:49

## 2020-12-28 RX ADMIN — KETOROLAC TROMETHAMINE 30 MG: 30 INJECTION, SOLUTION INTRAMUSCULAR at 04:49

## 2020-12-28 RX ADMIN — MYCOPHENOLATE MOFETIL 500 MG: 250 CAPSULE ORAL at 17:10

## 2020-12-28 RX ADMIN — OXYCODONE 5 MG: 5 TABLET ORAL at 11:10

## 2020-12-28 RX ADMIN — POTASSIUM CHLORIDE 40 MEQ: 1500 TABLET, EXTENDED RELEASE ORAL at 04:51

## 2020-12-28 RX ADMIN — IVABRADINE 7.5 MG: 7.5 TABLET, FILM COATED ORAL at 18:52

## 2020-12-28 RX ADMIN — OXYBUTYNIN CHLORIDE 5 MG: 5 TABLET ORAL at 11:10

## 2020-12-28 RX ADMIN — OMEPRAZOLE 20 MG: 20 CAPSULE, DELAYED RELEASE ORAL at 04:49

## 2020-12-28 RX ADMIN — BUSPIRONE HYDROCHLORIDE 15 MG: 10 TABLET ORAL at 11:10

## 2020-12-28 RX ADMIN — POTASSIUM CHLORIDE 40 MEQ: 1500 TABLET, EXTENDED RELEASE ORAL at 17:07

## 2020-12-28 RX ADMIN — OXYCODONE 5 MG: 5 TABLET ORAL at 03:17

## 2020-12-28 RX ADMIN — ACETAZOLAMIDE 500 MG: 500 CAPSULE, EXTENDED RELEASE ORAL at 04:53

## 2020-12-28 RX ADMIN — ZIPRASIDONE HYDROCHLORIDE 40 MG: 40 CAPSULE ORAL at 18:52

## 2020-12-28 RX ADMIN — MEROPENEM 500 MG: 500 INJECTION, POWDER, FOR SOLUTION INTRAVENOUS at 17:07

## 2020-12-28 RX ADMIN — GABAPENTIN 300 MG: 300 CAPSULE ORAL at 17:11

## 2020-12-28 RX ADMIN — DIPHENHYDRAMINE HYDROCHLORIDE 25 MG: 25 TABLET ORAL at 17:21

## 2020-12-28 RX ADMIN — OXCARBAZEPINE 300 MG: 300 TABLET, FILM COATED ORAL at 04:49

## 2020-12-28 RX ADMIN — ZIPRASIDONE HYDROCHLORIDE 40 MG: 40 CAPSULE ORAL at 04:53

## 2020-12-28 RX ADMIN — OXYCODONE 5 MG: 5 TABLET ORAL at 17:08

## 2020-12-28 RX ADMIN — ASPIRIN 81 MG: 81 TABLET, COATED ORAL at 04:49

## 2020-12-28 RX ADMIN — LEVOTHYROXINE SODIUM 75 MCG: 0.07 TABLET ORAL at 04:49

## 2020-12-28 RX ADMIN — ACETAMINOPHEN 650 MG: 325 TABLET ORAL at 18:52

## 2020-12-28 RX ADMIN — ENOXAPARIN SODIUM 40 MG: 40 INJECTION SUBCUTANEOUS at 04:48

## 2020-12-28 RX ADMIN — TRAZODONE HYDROCHLORIDE 100 MG: 50 TABLET ORAL at 17:07

## 2020-12-28 RX ADMIN — ACETAZOLAMIDE 500 MG: 500 CAPSULE, EXTENDED RELEASE ORAL at 17:08

## 2020-12-28 RX ADMIN — MORPHINE SULFATE 2 MG: 4 INJECTION INTRAVENOUS at 13:19

## 2020-12-28 RX ADMIN — MORPHINE SULFATE 2 MG: 4 INJECTION INTRAVENOUS at 08:44

## 2020-12-28 RX ADMIN — FLUOXETINE 40 MG: 20 CAPSULE ORAL at 04:49

## 2020-12-28 RX ADMIN — OXYBUTYNIN CHLORIDE 5 MG: 5 TABLET ORAL at 04:49

## 2020-12-28 RX ADMIN — MEROPENEM 500 MG: 500 INJECTION, POWDER, FOR SOLUTION INTRAVENOUS at 11:09

## 2020-12-28 RX ADMIN — METHOCARBAMOL TABLETS 750 MG: 750 TABLET, COATED ORAL at 21:31

## 2020-12-28 RX ADMIN — SODIUM BICARBONATE 650 MG: 650 TABLET ORAL at 04:49

## 2020-12-28 RX ADMIN — ACETAMINOPHEN 650 MG: 325 TABLET ORAL at 04:48

## 2020-12-28 RX ADMIN — KETOROLAC TROMETHAMINE 30 MG: 30 INJECTION, SOLUTION INTRAMUSCULAR at 11:10

## 2020-12-28 RX ADMIN — TOPIRAMATE 50 MG: 25 TABLET, FILM COATED ORAL at 17:11

## 2020-12-28 RX ADMIN — PHENAZOPYRIDINE HYDROCHLORIDE 100 MG: 100 TABLET ORAL at 11:10

## 2020-12-28 RX ADMIN — OXCARBAZEPINE 300 MG: 300 TABLET, FILM COATED ORAL at 17:10

## 2020-12-28 RX ADMIN — TOPIRAMATE 50 MG: 25 TABLET, FILM COATED ORAL at 04:50

## 2020-12-28 RX ADMIN — PHENAZOPYRIDINE HYDROCHLORIDE 100 MG: 100 TABLET ORAL at 08:43

## 2020-12-28 RX ADMIN — MEROPENEM 500 MG: 500 INJECTION, POWDER, FOR SOLUTION INTRAVENOUS at 04:50

## 2020-12-28 RX ADMIN — BUSPIRONE HYDROCHLORIDE 15 MG: 10 TABLET ORAL at 04:48

## 2020-12-28 RX ADMIN — OXYBUTYNIN CHLORIDE 5 MG: 5 TABLET ORAL at 17:07

## 2020-12-28 ASSESSMENT — PAIN DESCRIPTION - PAIN TYPE
TYPE: ACUTE PAIN

## 2020-12-28 ASSESSMENT — ENCOUNTER SYMPTOMS
CHILLS: 0
VOMITING: 0
CONSTIPATION: 0
NAUSEA: 1
DIARRHEA: 1
DIARRHEA: 0
FLANK PAIN: 1
ABDOMINAL PAIN: 1
VOMITING: 1
FEVER: 0

## 2020-12-28 NOTE — DIETARY
NUTRITION SERVICES: BMI - Pt with BMI >40 (=Body mass index is 40.6 kg/m².), based on admit weight from other healthcare facility - morbid obesity. Weight loss counseling not appropriate in acute care setting. RECOMMEND - Referral to outpatient nutrition services for weight management after D/C.

## 2020-12-28 NOTE — PROGRESS NOTES
Infectious Disease Progress Note    Author: Cande Guzman M.D. Date & Time of service: 2020  3:18 PM    Chief Complaint:  UTI, ESBL Klebsiella pneumoniae    Interval History:    Review of Systems:  Review of Systems   Constitutional: Positive for malaise/fatigue. Negative for chills and fever.   Gastrointestinal: Positive for abdominal pain and nausea. Negative for constipation, diarrhea and vomiting.   Genitourinary: Positive for flank pain.       Hemodynamics:  Temp (24hrs), Av.4 °C (97.6 °F), Min:35.9 °C (96.7 °F), Max:36.8 °C (98.2 °F)  Temperature: 35.9 °C (96.7 °F)  Pulse  Av.8  Min: 62  Max: 83   Blood Pressure: 104/54       Physical Exam:  Physical Exam  Constitutional:       Appearance: Normal appearance. She is obese.   Cardiovascular:      Rate and Rhythm: Normal rate and regular rhythm.      Heart sounds: Normal heart sounds.   Pulmonary:      Effort: Pulmonary effort is normal.      Breath sounds: Normal breath sounds.   Abdominal:      General: Abdomen is flat. There is no distension.      Palpations: Abdomen is soft.      Tenderness: There is abdominal tenderness. There is no guarding.      Comments: Tender to palpation, diffusely.  Suprapubic catheter with some erythema and tenderness and skin soft tissue surrounding   Musculoskeletal:      Right lower leg: Edema present.      Left lower leg: Edema present.   Skin:     General: Skin is dry.   Neurological:      General: No focal deficit present.      Mental Status: She is alert and oriented to person, place, and time.   Psychiatric:         Mood and Affect: Mood normal.         Behavior: Behavior normal.         Meds:    Current Facility-Administered Medications:   •  meropenem  •  acetaminophen  •  oxyCODONE immediate-release  •  diphenhydrAMINE  •  loperamide  •  acetaZOLAMIDE SR  •  enoxaparin (LOVENOX) injection  •  albuterol  •  aspirin EC  •  busPIRone  •  FLUoxetine  •  gabapentin  •  ivabradine  •  levothyroxine  •   methocarbamol  •  mycophenolate  •  omeprazole  •  OXcarbazepine  •  oxybutynin  •  potassium chloride SA  •  sodium bicarbonate  •  topiramate  •  traZODone  •  ziprasidone  •  NS  •  ketorolac  •  morphine injection  •  hyoscyamine-maalox plus-lidocaine viscous  •  ipratropium-albuterol  •  Respiratory Therapy Consult    Labs:  Recent Labs     12/26/20  0237 12/27/20  0102 12/28/20  1208   WBC 3.8* 3.7* 4.8   RBC 3.88* 3.91* 4.10*   HEMOGLOBIN 11.2* 11.5* 12.0   HEMATOCRIT 35.6* 37.7 39.4   MCV 91.8 96.4 96.1   MCH 28.9 29.4 29.3   RDW 50.3* 53.1* 52.9*   PLATELETCT 149* 87* 165   MPV 11.8 12.4 11.3   NEUTSPOLYS 47.90 43.20* 57.90   LYMPHOCYTES 39.60 44.40* 31.00   MONOCYTES 7.20 7.30 6.40   EOSINOPHILS 3.70 3.80 3.70   BASOPHILS 0.80 0.80 0.60     Recent Labs     12/26/20  0237 12/27/20  0102 12/28/20  1208   SODIUM 138 137 141   POTASSIUM 3.9 3.7 3.9   CHLORIDE 112 114* 118*   CO2 13* 12* 13*   GLUCOSE 84 88 100*   BUN 6* 5* 4*     Recent Labs     12/26/20  0237 12/27/20  0102 12/28/20  1208   CREATININE 0.60 0.53 0.56       Imaging:  Ct-head W/o    Result Date: 12/27/2020 12/27/2020 3:09 AM HISTORY/REASON FOR EXAM:  Dizziness, non-specific. Vision changes, severe headache TECHNIQUE/EXAM DESCRIPTION AND NUMBER OF VIEWS: CT of the head without contrast. The study was performed on a helical multidetector CT scanner. Contiguous 2.5 mm axial sections were obtained from the skull base through the vertex. Up to date radiation dose reduction adjustments have been utilized to meet ALARA standards for radiation dose reduction. COMPARISON:  10/20/2020 FINDINGS: Ventricles, cisterns and cortical sulci are within normal limits. No acute intracranial hemorrhage, major vascular territory infarct, mass effect, midline shift or hydrocephalus.  Incidental partially empty sella. The visualized paranasal sinuses and mastoids are clear apart from mild mucosal thickening in the maxillary sinuses. No depressed calvarial fracture.      No acute intracranial abnormality.    Ir-midline Catheter Insertion Wo Guidance > Age 3    Result Date: 12/27/2020  HISTORY/REASON FOR EXAM:  Midline Placement   TECHNIQUE/EXAM DESCRIPTION AND NUMBER OF VIEWS: Midline insertion with ultrasound guidance.  FINDINGS: Midline insertion with Ultrasound Guidance was performed by qualified nursing staff without the assistance of a Radiologist. Midline positioning as measured by RN or as appropriate length of catheter selected.              Ultrasound-guided midline placement performed by qualified nursing staff as above.       Micro:  Results     Procedure Component Value Units Date/Time    URINE CULTURE(NEW) [[id number]]  (Abnormal)  (Susceptibility) Collected: 12/25/20 0355    Order Status: Completed Specimen: Urine Updated: 12/27/20 0724     Significant Indicator POS     Source UR     Site -     Culture Result -      Klebsiella oxytoca ESBL  >100,000 cfu/mL  Extended Spectrum Beta-lactamase (ESBL) isolated.  ESBL's may be clinically resistant to therapy with  Penicillins,Cephalosporins or Aztreonam despite  apparent in vitro susceptibility to some of these agents.  The patient requires contact isolation.  Please contact pharmacy or an Infectious Disease Specialist  if you have any questions about appropriate therapy.      Narrative:      CALL  Reis  131 tel. [phone],  CALLED  131 tel. [phone] 12/27/2020, 07:24, RB PERF. RESULTS CALLED  TO:[id number] RN  Indication for culture:->Patient WITHOUT an indwelling Andrade  catheter in place with new onset of Dysuria, Frequency,  Urgency, and/or Suprapubic pain  Indication for culture:->Patient WITHOUT an indwelling Andrade    Susceptibility     Klebsiella oxytoca esbl (1)     Antibiotic Interpretation Microscan Method Status    Ceftriaxone Extended Spectrum Beta Lactamase >32 mcg/mL TYLER Final    Ceftazidime Extended Spectrum Beta Lactamase 4 mcg/mL TYLER Final    Cefotaxime Extended Spectrum Beta Lactamase 8 mcg/mL TYLER Final     "Cefazolin Resistant >16 mcg/mL TYLER Final    Ciprofloxacin Intermediate 2 mcg/mL TYLER Final    Cefepime Resistant 8 mcg/mL TYLER Final    Ampicillin Resistant >16 mcg/mL TYLER Final    Cefotetan Sensitive <=16 mcg/mL TYLER Final    Tobramycin Sensitive <=4 mcg/mL TYLER Final    Nitrofurantoin Resistant >64 mcg/mL TYLER Final    Gentamicin Sensitive <=4 mcg/mL TYLER Final    Levofloxacin Sensitive <=2 mcg/mL TYLER Final    Pip/Tazobactam Resistant >64 mcg/mL TYLER Final    Trimeth/Sulfa Resistant >2/38 mcg/mL TYLER Final                   C Diff by PCR rflx Toxin [[id number]] Collected: 12/26/20 0315    Order Status: Completed Specimen: Stool Updated: 12/26/20 0927     C Diff by PCR Negative     Comment: C. difficile NOT detected by PCR.  Treatment not indicated per guidelines.  Repeat testing not indicated within 7 days.          027-NAP1-BI Presumptive Negative     Comment: Presumptive 027/NAP1/BI target DNA sequences are NOT DETECTED.       Narrative:      Special Contact Isolation  Does this patient have risk factors for C-diff?->Yes    BLOOD CULTURE [[id number]] Collected: 12/25/20 0359    Order Status: Completed Specimen: Blood from Peripheral Updated: 12/26/20 0926     Significant Indicator NEG     Source BLD     Site PERIPHERAL     Culture Result No Growth  Note: Blood cultures are incubated for 5 days and  are monitored continuously.Positive blood cultures  are called to the RN and reported as soon as  they are identified.      Narrative:      2 of 2 blood culture x2  Sites order. Per Hospital Policy:  Only change Specimen Src: to \"Line\" if specified by physician  order.  No site indicated    BLOOD CULTURE [[id number]] Collected: 12/25/20 0300    Order Status: Completed Specimen: Blood from Peripheral Updated: 12/26/20 0926     Significant Indicator NEG     Source BLD     Site PERIPHERAL     Culture Result No Growth  Note: Blood cultures are incubated for 5 days and  are monitored continuously.Positive blood cultures  are called " "to the RN and reported as soon as  they are identified.      Narrative:      1 of 2 for Blood Culture x 2 sites order. Per Hospital  Policy: Only change Specimen Src: to \"Line\" if specified by  physician order.  No site indicated    CoV-2, Flu A/B, And RSV by PCR [[id number]] Collected: 12/25/20 0334    Order Status: Completed Updated: 12/25/20 0453     Influenza virus A RNA Negative     Influenza virus B, PCR Negative     RSV, PCR Negative     SARS-CoV-2 by PCR NotDetected     Comment: PATIENTS: Important information regarding your results and instructions can  be found at [email or web address]   \"After your  Covid-19 Test\"  [facility] providers: PLEASE REFER TO DE-ESCALATION AND RETESTING PROTOCOL  on [email or web address]  **The [employer] GeneXpert Xpress SARS-CoV-2 Test has been made available for  use under the Emergency Use Authorization (EUA) only.          SARS-CoV-2 Source NP Swab    Narrative:      Is patient being admitted?->Yes  Does this patient meet criteria for Rush/Cepheid per [facility]  Inpatient Workflow? (See workflow link below)->Yes  Expected turn around time?->Rush (Cepheid 2-4 hours)  Have you been in close contact with a person who is suspected  or known to be positive for COVID-19 within the last 30 days  (e.g. last seen that person < 30 days ago)->No    COVID/SARS CoV-2 PCR [[id number]] Collected: 12/25/20 0334    Order Status: Completed Specimen: Respirate from Nasopharyngeal Updated: 12/25/20 0341     COVID Order Status Received     Comment: The order for SARS CoV-2 testing has been received by the  Laboratory. This result is neither positive nor negative.  Final results of testing will report in 24-48 hours, separately.         Narrative:      Is patient being admitted?->Yes  Does this patient meet criteria for Rush/Cepheid per [facility]  Inpatient Workflow? (See workflow link below)->Yes  Expected turn around time?->Rush (Cepheid 2-4 hours)  Have you been in close contact with a " person who is suspected  or known to be positive for COVID-19 within the last 30 days  (e.g. last seen that person < 30 days ago)->No          Assessment:  Active Hospital Problems    Diagnosis   • Complicated UTI (urinary tract infection) [N39.0]   • Diarrhea of presumed infectious origin [R19.7]   • Metabolic acidosis [E87.2]   • Diabetes mellitus type 2 in obese (HCC) [E11.69, E66.9]   • Immunocompromised (HCC) [D84.9]   • Depression [F32.9]   • Anxiety [F41.9]     Interval 24 hours:      AF, O2 RA  Labs reviewed  Imaging personally reviewed both images and report.   Micro reviewed    Patient is complaining of ongoing abdominal pain and spasming since exchange of suprapubic catheter.  She is also complaining of bilateral flank pain and nausea.  Reports some pruritus with meropenem but no rash and is managing it with Benadryl.        IMPRESSION:   1.  Complicated urinary tract infection              2.  ESBL Klebsiella pneumoniae infection  3.  Chronic suprapubic catheter  4.  Multiple antibiotic allergies        PLAN:     30 y.o. morbidly obese woman with a history of optic neuritis on CellCept, psychiatric disorders, type 2 diabetes mellitus, chronic suprapubic catheter since August 2020 secondary to urinary retention and followed by urology [place], recent ESBL urinary tract infections admitted for worsening nausea, vomiting and abdominal pain in the setting of Bactrim use. Her suprapubic catheter was changed on Monday prior to admission and again yesterday per patient. Her clinical presentation is consistent with a complicated urinary tract infection given her symptoms. Urine culture is now growing ESBL Klebsiella oxytoca. Patient is otherwise hemodynamically stable without any fevers.     -Continue meropenem 500 mg every 6 hours  --Obtain either CT or ultrasound to evaluate for pyelonephritis as this will affect duration of antibiotics   --She states that the suprapubic catheter was exchanged again yesterday  but I do not see any notes of this.  We will need to clarify and if the catheter has not been changed since the positive urine culture will need to be exchanged.       Plan of care discussed with Dr. Olson.   ID will follow.

## 2020-12-28 NOTE — PROGRESS NOTES
[facility] Medicine Daily Progress Note    Date of Service  12/27/2020    Chief Complaint  30 y.o. female admitted 12/25/2020 with UTI    Hospital Course  30 y.o. female who presented 12/25/2020 with abdominal pain, nausea, vomiting, fever, chills. Patient was recently seen in [place] [address] and [facility] where she was diagnosed with UTI and dischraged home on Bactrim. Patient has longstanding suprapubic jara catheter that was recently changed. She reports compliance to bactrim but developed nausea and vomiting that she can no longer tolerate PO intake and came to ED. Patient has history of multiple UTI with ESBL. Last positive culture from October grew Klebsiella sensitive to rocephin. Patient states that she feels febrile as well though no objective documentation of this. She also reports that since the antibiotics started, she's been having loose diarrhea.       Interval Problem Update  Continues to have diarrhea. Increased loperamide.  One episode of coffee ground emesis today, associated with pain. Will monitor CBC, electrolytes.  Urine grew klebsiella ESBL as before. ID consulted, appreciate recs. Switched to meropenem. Midline placed for better access. Patient complains of itching, but no rash or oropharyngeal swelling. Trial benadryl.       Consultants/Specialty  none    Code Status  Full Code    Disposition  Continue inpatient    Review of Systems  Review of Systems   Gastrointestinal: Positive for abdominal pain, diarrhea, nausea and vomiting.   Genitourinary: Positive for dysuria and flank pain.   All other systems reviewed and are negative.       Physical Exam  Temp:  [35.8 °C (96.5 °F)-37.2 °C (99 °F)] 36.2 °C (97.2 °F)  Pulse:  [62-73] 66  Resp:  [16-20] 16  BP: ()/(52-76) 125/76  SpO2:  [93 %-96 %] 93 %    Physical Exam  Constitutional:       Appearance: She is obese. She is ill-appearing.   HENT:      Head: Normocephalic and atraumatic.      Right Ear: External ear normal.      Left Ear:  External ear normal.      Nose: Nose normal.      Mouth/Throat:      Mouth: Mucous membranes are moist.      Pharynx: Oropharynx is clear.   Eyes:      General: No scleral icterus.     Conjunctiva/sclera: Conjunctivae normal.   Neck:      Musculoskeletal: Normal range of motion and neck supple.   Cardiovascular:      Rate and Rhythm: Normal rate and regular rhythm.   Pulmonary:      Effort: Pulmonary effort is normal.      Breath sounds: Normal breath sounds.   Abdominal:      General: There is no distension.      Palpations: Abdomen is soft.      Tenderness: There is abdominal tenderness. There is no guarding.      Comments: TTP around suprapubic catheter site. Light fibrinous exudate. Catheter draining clear pink urine (on phenazopyridine).    Musculoskeletal:         General: No swelling or signs of injury.   Skin:     General: Skin is warm and dry.   Neurological:      General: No focal deficit present.      Mental Status: She is alert and oriented to person, place, and time.   Psychiatric:         Mood and Affect: Mood is anxious. Affect is tearful.         Behavior: Behavior normal.         Thought Content: Thought content normal.         Cognition and Memory: Cognition normal.         Fluids    Intake/Output Summary (Last 24 hours) at 12/27/2020 1606  Last data filed at 12/27/2020 0900  Gross per 24 hour   Intake 600 ml   Output 0 ml   Net 600 ml       Laboratory  Recent Labs     12/25/20  0300 12/26/20  0237 12/27/20  0102   WBC 4.9 3.8* 3.7*   RBC 4.23 3.88* 3.91*   HEMOGLOBIN 12.2 11.2* 11.5*   HEMATOCRIT 38.4 35.6* 37.7   MCV 90.8 91.8 96.4   MCH 28.8 28.9 29.4   MCHC 31.8* 31.5* 30.5*   RDW 48.6 50.3* 53.1*   PLATELETCT 174 149* 87*   MPV 11.8 11.8 12.4     Recent Labs     12/25/20  0300 12/26/20  0237 12/27/20  0102   SODIUM 139 138 137   POTASSIUM 3.7 3.9 3.7   CHLORIDE 111 112 114*   CO2 16* 13* 12*   GLUCOSE 100* 84 88   BUN 5* 6* 5*   CREATININE 0.73 0.60 0.53   CALCIUM 9.2 8.6 8.5                    Imaging  IR-MIDLINE CATHETER INSERTION WO GUIDANCE > AGE 3   Final Result                  Ultrasound-guided midline placement performed by qualified nursing staff    as above.          CT-HEAD W/O   Final Result      No acute intracranial abnormality.           Assessment/Plan  Complicated UTI (urinary tract infection)- (present on admission)  Assessment & Plan  Failed outpatient therapy. Catheter was recently changed.   Continue pain control as ordered, with phenazopyridine.  Follow up urine culture. So far, klebsiella ESBL.  ID consulted, appreciate recs. Switched to meropenem. Midline placed for better access. Patient complains of itching, but no rash or oropharyngeal swelling. Trial benadryl.     Diarrhea of presumed infectious origin- (present on admission)  Assessment & Plan  Recently on bactrim and reports loose watery diarrhea. Electrolytes stable.  C.diff negative.  Increased Loperamide prn.    Metabolic acidosis- (present on admission)  Assessment & Plan  Lactate, beta-hydroxybutyrate wnl.  May be 2/2 diamox.  Monitor.    Diabetes mellitus type 2 in obese (HCC)- (present on admission)  Assessment & Plan  Recent A1c 5.1.   Insulin SS, POCT glucose, hypoglycemia protocol.    Immunocompromised (HCC)- (present on admission)  Assessment & Plan  Continue home medications.    Depression- (present on admission)  Assessment & Plan  Stable.   Continue home meds.    Anxiety- (present on admission)  Assessment & Plan  Stable.  Continue home meds.       VTE prophylaxis: enoxaparin

## 2020-12-28 NOTE — PROGRESS NOTES
[facility] Medicine Daily Progress Note    Date of Service  12/28/2020    Chief Complaint  30 y.o. female admitted 12/25/2020 with UTI    Hospital Course  30 y.o. female who presented 12/25/2020 with abdominal pain, nausea, vomiting, fever, chills. Patient was recently seen in [place] [address] and [facility] where she was diagnosed with UTI and dischraged home on Bactrim. Patient has longstanding suprapubic jara catheter that was recently changed. She reports compliance to bactrim but developed nausea and vomiting that she can no longer tolerate PO intake and came to ED. Patient has history of multiple UTI with ESBL. Last positive culture from October grew Klebsiella sensitive to rocephin. Patient states that she feels febrile as well though no objective documentation of this. She also reports that since the antibiotics started, she's been having loose diarrhea.       Interval Problem Update  Nauseated. Wants to deescalate diet to full liquids.  H/H stable.  Difficult IV access. PICC ordered.  Appreciate ID recs.  Suprapubic catheter exchanged.      Consultants/Specialty  none    Code Status  Full Code    Disposition  Continue inpatient    Review of Systems  Review of Systems   Gastrointestinal: Positive for abdominal pain, diarrhea, nausea and vomiting.   Genitourinary: Positive for dysuria and flank pain.   All other systems reviewed and are negative.       Physical Exam  Temp:  [35.9 °C (96.7 °F)-36.8 °C (98.2 °F)] 35.9 °C (96.7 °F)  Pulse:  [67-73] 71  Resp:  [16-18] 16  BP: ()/(52-73) 104/54  SpO2:  [90 %-96 %] 96 %    Physical Exam  Constitutional:       Appearance: She is obese. She is ill-appearing.   HENT:      Head: Normocephalic and atraumatic.      Right Ear: External ear normal.      Left Ear: External ear normal.      Nose: Nose normal.      Mouth/Throat:      Mouth: Mucous membranes are moist.      Pharynx: Oropharynx is clear.   Eyes:      General: No scleral icterus.      Conjunctiva/sclera: Conjunctivae normal.   Neck:      Musculoskeletal: Normal range of motion and neck supple.   Cardiovascular:      Rate and Rhythm: Normal rate and regular rhythm.   Pulmonary:      Effort: Pulmonary effort is normal.      Breath sounds: Normal breath sounds.   Abdominal:      General: There is no distension.      Palpations: Abdomen is soft.      Tenderness: There is abdominal tenderness. There is no guarding.      Comments: TTP around suprapubic catheter site. Light fibrinous exudate. Catheter draining clear pink urine (on phenazopyridine).    Musculoskeletal:         General: No swelling or signs of injury.   Skin:     General: Skin is warm and dry.   Neurological:      General: No focal deficit present.      Mental Status: She is alert and oriented to person, place, and time.   Psychiatric:         Mood and Affect: Mood is anxious. Affect is tearful.         Behavior: Behavior normal.         Thought Content: Thought content normal.         Cognition and Memory: Cognition normal.         Fluids    Intake/Output Summary (Last 24 hours) at 12/28/2020 1346  Last data filed at 12/28/2020 1142  Gross per 24 hour   Intake --   Output 2850 ml   Net -2850 ml       Laboratory  Recent Labs     12/26/20  0237 12/27/20  0102 12/28/20  1208   WBC 3.8* 3.7* 4.8   RBC 3.88* 3.91* 4.10*   HEMOGLOBIN 11.2* 11.5* 12.0   HEMATOCRIT 35.6* 37.7 39.4   MCV 91.8 96.4 96.1   MCH 28.9 29.4 29.3   MCHC 31.5* 30.5* 30.5*   RDW 50.3* 53.1* 52.9*   PLATELETCT 149* 87* 165   MPV 11.8 12.4 11.3     Recent Labs     12/26/20  0237 12/27/20  0102 12/28/20  1208   SODIUM 138 137 141   POTASSIUM 3.9 3.7 3.9   CHLORIDE 112 114* 118*   CO2 13* 12* 13*   GLUCOSE 84 88 100*   BUN 6* 5* 4*   CREATININE 0.60 0.53 0.56   CALCIUM 8.6 8.5 8.7                   Imaging  IR-MIDLINE CATHETER INSERTION WO GUIDANCE > AGE 3   Final Result                  Ultrasound-guided midline placement performed by qualified nursing staff    as above.           CT-HEAD W/O   Final Result      No acute intracranial abnormality.      IR-PICC LINE PLACEMENT W/ GUIDANCE > AGE 5    (Results Pending)        Assessment/Plan  Complicated UTI (urinary tract infection)- (present on admission)  Assessment & Plan  Failed outpatient therapy. Catheter was recently changed.   Continue pain control as ordered, with phenazopyridine.  Follow up urine culture. So far, klebsiella ESBL.  ID consulted, appreciate recs. Switched to meropenem. PICC placed for better access. Patient complains of itching, but no rash or oropharyngeal swelling. Trial benadryl.    Renal US.    Diarrhea of presumed infectious origin- (present on admission)  Assessment & Plan  Recently on bactrim and reports loose watery diarrhea. Electrolytes stable.  C.diff negative.  Continue Loperamide prn.    Metabolic acidosis- (present on admission)  Assessment & Plan  Lactate, beta-hydroxybutyrate wnl.  May be 2/2 diamox.  Monitor.    Diabetes mellitus type 2 in obese (HCC)- (present on admission)  Assessment & Plan  Recent A1c 5.1.   Insulin SS, POCT glucose, hypoglycemia protocol.    Immunocompromised (HCC)- (present on admission)  Assessment & Plan  Continue home medications.    Depression- (present on admission)  Assessment & Plan  Stable.   Continue home meds.    Anxiety- (present on admission)  Assessment & Plan  Stable.  Continue home meds.       VTE prophylaxis: enoxaparin

## 2020-12-29 ENCOUNTER — TELEPHONE (OUTPATIENT)
Dept: MEDICAL GROUP | Facility: MEDICAL CENTER | Age: 30
End: 2020-12-29

## 2020-12-29 DIAGNOSIS — E66.9 DIABETES MELLITUS TYPE 2 IN OBESE: ICD-10-CM

## 2020-12-29 DIAGNOSIS — K76.0 FATTY LIVER DISEASE, NONALCOHOLIC: ICD-10-CM

## 2020-12-29 DIAGNOSIS — E11.69 DIABETES MELLITUS TYPE 2 IN OBESE: ICD-10-CM

## 2020-12-29 LAB
ANION GAP SERPL CALC-SCNC: 9 MMOL/L (ref 7–16)
BUN SERPL-MCNC: 3 MG/DL (ref 8–22)
CALCIUM SERPL-MCNC: 8 MG/DL (ref 8.5–10.5)
CHLORIDE SERPL-SCNC: 114 MMOL/L (ref 96–112)
CO2 SERPL-SCNC: 16 MMOL/L (ref 20–33)
CREAT SERPL-MCNC: 0.59 MG/DL (ref 0.5–1.4)
GLUCOSE SERPL-MCNC: 98 MG/DL (ref 65–99)
MAGNESIUM SERPL-MCNC: 1.7 MG/DL (ref 1.5–2.5)
POTASSIUM SERPL-SCNC: 3.7 MMOL/L (ref 3.6–5.5)
SODIUM SERPL-SCNC: 139 MMOL/L (ref 135–145)

## 2020-12-29 PROCEDURE — 700105 HCHG RX REV CODE 258: Performed by: INTERNAL MEDICINE

## 2020-12-29 PROCEDURE — 80048 BASIC METABOLIC PNL TOTAL CA: CPT

## 2020-12-29 PROCEDURE — 93005 ELECTROCARDIOGRAM TRACING: CPT | Performed by: INTERNAL MEDICINE

## 2020-12-29 PROCEDURE — 700111 HCHG RX REV CODE 636 W/ 250 OVERRIDE (IP): Performed by: INTERNAL MEDICINE

## 2020-12-29 PROCEDURE — A9270 NON-COVERED ITEM OR SERVICE: HCPCS | Performed by: INTERNAL MEDICINE

## 2020-12-29 PROCEDURE — 700102 HCHG RX REV CODE 250 W/ 637 OVERRIDE(OP): Performed by: INTERNAL MEDICINE

## 2020-12-29 PROCEDURE — 770021 HCHG ROOM/CARE - ISO PRIVATE

## 2020-12-29 PROCEDURE — 97167 OT EVAL HIGH COMPLEX 60 MIN: CPT

## 2020-12-29 PROCEDURE — 99232 SBSQ HOSP IP/OBS MODERATE 35: CPT | Performed by: INTERNAL MEDICINE

## 2020-12-29 PROCEDURE — 700111 HCHG RX REV CODE 636 W/ 250 OVERRIDE (IP): Performed by: NURSE PRACTITIONER

## 2020-12-29 PROCEDURE — 97161 PT EVAL LOW COMPLEX 20 MIN: CPT

## 2020-12-29 PROCEDURE — 83735 ASSAY OF MAGNESIUM: CPT

## 2020-12-29 PROCEDURE — 700105 HCHG RX REV CODE 258: Performed by: NURSE PRACTITIONER

## 2020-12-29 RX ORDER — LIDOCAINE HYDROCHLORIDE 20 MG/ML
15 SOLUTION OROPHARYNGEAL EVERY 4 HOURS PRN
Status: DISCONTINUED | OUTPATIENT
Start: 2020-12-29 | End: 2020-12-30 | Stop reason: HOSPADM

## 2020-12-29 RX ADMIN — OXCARBAZEPINE 300 MG: 300 TABLET, FILM COATED ORAL at 06:01

## 2020-12-29 RX ADMIN — BUSPIRONE HYDROCHLORIDE 15 MG: 10 TABLET ORAL at 05:59

## 2020-12-29 RX ADMIN — OXYCODONE 5 MG: 5 TABLET ORAL at 06:01

## 2020-12-29 RX ADMIN — MORPHINE SULFATE 2 MG: 4 INJECTION INTRAVENOUS at 08:58

## 2020-12-29 RX ADMIN — SODIUM CHLORIDE: 9 INJECTION, SOLUTION INTRAVENOUS at 21:30

## 2020-12-29 RX ADMIN — MORPHINE SULFATE 2 MG: 4 INJECTION INTRAVENOUS at 13:29

## 2020-12-29 RX ADMIN — MEROPENEM 500 MG: 500 INJECTION, POWDER, FOR SOLUTION INTRAVENOUS at 00:59

## 2020-12-29 RX ADMIN — OXYBUTYNIN CHLORIDE 5 MG: 5 TABLET ORAL at 17:34

## 2020-12-29 RX ADMIN — SODIUM BICARBONATE 650 MG: 650 TABLET ORAL at 06:01

## 2020-12-29 RX ADMIN — OXCARBAZEPINE 300 MG: 300 TABLET, FILM COATED ORAL at 17:35

## 2020-12-29 RX ADMIN — POTASSIUM CHLORIDE 40 MEQ: 1500 TABLET, EXTENDED RELEASE ORAL at 06:07

## 2020-12-29 RX ADMIN — ACETAZOLAMIDE 500 MG: 500 CAPSULE, EXTENDED RELEASE ORAL at 05:58

## 2020-12-29 RX ADMIN — DIPHENHYDRAMINE HYDROCHLORIDE 25 MG: 25 TABLET ORAL at 18:23

## 2020-12-29 RX ADMIN — BUSPIRONE HYDROCHLORIDE 15 MG: 10 TABLET ORAL at 11:47

## 2020-12-29 RX ADMIN — KETOROLAC TROMETHAMINE 30 MG: 30 INJECTION, SOLUTION INTRAMUSCULAR at 11:49

## 2020-12-29 RX ADMIN — ACETAMINOPHEN 650 MG: 325 TABLET ORAL at 17:33

## 2020-12-29 RX ADMIN — OXYCODONE 5 MG: 5 TABLET ORAL at 21:13

## 2020-12-29 RX ADMIN — TRAZODONE HYDROCHLORIDE 100 MG: 50 TABLET ORAL at 17:35

## 2020-12-29 RX ADMIN — MEROPENEM 500 MG: 500 INJECTION, POWDER, FOR SOLUTION INTRAVENOUS at 11:51

## 2020-12-29 RX ADMIN — ZIPRASIDONE HYDROCHLORIDE 40 MG: 40 CAPSULE ORAL at 21:13

## 2020-12-29 RX ADMIN — OXYBUTYNIN CHLORIDE 5 MG: 5 TABLET ORAL at 05:59

## 2020-12-29 RX ADMIN — METHOCARBAMOL TABLETS 750 MG: 750 TABLET, COATED ORAL at 21:15

## 2020-12-29 RX ADMIN — MYCOPHENOLATE MOFETIL 500 MG: 250 CAPSULE ORAL at 06:02

## 2020-12-29 RX ADMIN — ACETAMINOPHEN 650 MG: 325 TABLET ORAL at 00:59

## 2020-12-29 RX ADMIN — IVABRADINE 7.5 MG: 7.5 TABLET, FILM COATED ORAL at 17:34

## 2020-12-29 RX ADMIN — MYCOPHENOLATE MOFETIL 500 MG: 250 CAPSULE ORAL at 17:33

## 2020-12-29 RX ADMIN — ACETAZOLAMIDE 500 MG: 500 CAPSULE, EXTENDED RELEASE ORAL at 21:12

## 2020-12-29 RX ADMIN — TOPIRAMATE 50 MG: 25 TABLET, FILM COATED ORAL at 06:10

## 2020-12-29 RX ADMIN — MEROPENEM 500 MG: 500 INJECTION, POWDER, FOR SOLUTION INTRAVENOUS at 05:58

## 2020-12-29 RX ADMIN — GABAPENTIN 300 MG: 300 CAPSULE ORAL at 17:34

## 2020-12-29 RX ADMIN — ASPIRIN 81 MG: 81 TABLET, COATED ORAL at 06:00

## 2020-12-29 RX ADMIN — ZIPRASIDONE HYDROCHLORIDE 40 MG: 40 CAPSULE ORAL at 06:00

## 2020-12-29 RX ADMIN — KETOROLAC TROMETHAMINE 30 MG: 30 INJECTION, SOLUTION INTRAMUSCULAR at 05:58

## 2020-12-29 RX ADMIN — KETOROLAC TROMETHAMINE 30 MG: 30 INJECTION, SOLUTION INTRAMUSCULAR at 17:38

## 2020-12-29 RX ADMIN — BUSPIRONE HYDROCHLORIDE 15 MG: 10 TABLET ORAL at 17:37

## 2020-12-29 RX ADMIN — OXYBUTYNIN CHLORIDE 5 MG: 5 TABLET ORAL at 11:49

## 2020-12-29 RX ADMIN — MEROPENEM 500 MG: 500 INJECTION, POWDER, FOR SOLUTION INTRAVENOUS at 17:38

## 2020-12-29 RX ADMIN — SODIUM BICARBONATE 650 MG: 650 TABLET ORAL at 17:37

## 2020-12-29 RX ADMIN — KETOROLAC TROMETHAMINE 30 MG: 30 INJECTION, SOLUTION INTRAMUSCULAR at 00:59

## 2020-12-29 RX ADMIN — ACETAMINOPHEN 650 MG: 325 TABLET ORAL at 06:00

## 2020-12-29 RX ADMIN — OXYCODONE 5 MG: 5 TABLET ORAL at 17:34

## 2020-12-29 RX ADMIN — LEVOTHYROXINE SODIUM 75 MCG: 0.07 TABLET ORAL at 06:10

## 2020-12-29 RX ADMIN — ENOXAPARIN SODIUM 40 MG: 40 INJECTION SUBCUTANEOUS at 05:58

## 2020-12-29 RX ADMIN — OMEPRAZOLE 20 MG: 20 CAPSULE, DELAYED RELEASE ORAL at 05:58

## 2020-12-29 RX ADMIN — FLUOXETINE 40 MG: 20 CAPSULE ORAL at 06:02

## 2020-12-29 RX ADMIN — TOPIRAMATE 50 MG: 25 TABLET, FILM COATED ORAL at 17:35

## 2020-12-29 RX ADMIN — OXYCODONE 5 MG: 5 TABLET ORAL at 11:48

## 2020-12-29 RX ADMIN — IVABRADINE 7.5 MG: 7.5 TABLET, FILM COATED ORAL at 11:48

## 2020-12-29 ASSESSMENT — ENCOUNTER SYMPTOMS
DIZZINESS: 0
NERVOUS/ANXIOUS: 0
BACK PAIN: 1
FEVER: 0
ABDOMINAL PAIN: 1
NAUSEA: 1
FLANK PAIN: 1
SHORTNESS OF BREATH: 0

## 2020-12-29 ASSESSMENT — ACTIVITIES OF DAILY LIVING (ADL): TOILETING: DEPENDENT

## 2020-12-29 ASSESSMENT — COGNITIVE AND FUNCTIONAL STATUS - GENERAL
PERSONAL GROOMING: A LITTLE
MOBILITY SCORE: 14
SUGGESTED CMS G CODE MODIFIER DAILY ACTIVITY: CL
DAILY ACTIVITIY SCORE: 13
WALKING IN HOSPITAL ROOM: TOTAL
SUGGESTED CMS G CODE MODIFIER MOBILITY: CL
CLIMB 3 TO 5 STEPS WITH RAILING: TOTAL
STANDING UP FROM CHAIR USING ARMS: A LOT
DRESSING REGULAR LOWER BODY CLOTHING: TOTAL
HELP NEEDED FOR BATHING: TOTAL
TOILETING: TOTAL
DRESSING REGULAR UPPER BODY CLOTHING: A LITTLE
MOVING FROM LYING ON BACK TO SITTING ON SIDE OF FLAT BED: A LOT

## 2020-12-29 ASSESSMENT — PAIN DESCRIPTION - PAIN TYPE
TYPE: ACUTE PAIN

## 2020-12-29 ASSESSMENT — GAIT ASSESSMENTS: GAIT LEVEL OF ASSIST: UNABLE TO PARTICIPATE

## 2020-12-29 NOTE — THERAPY
"Physical Therapy   Initial Evaluation     Patient Name: Kristin Balderrama  Age:  30 y.o., Sex:  female  Medical Record #: [id number]  Today's Date: 12/29/2020     Precautions: Fall Risk    Assessment  Patient is 30 y.o. female admitted on 12/25/20 with abdominal pain, nausea, vomiting, fever, chills. Patient was recently seen in [address] where she was diagnosed with UTI and discharged home.  PMH: complex (see EMR). Pt presents today with self-limiting behavior, but agrees to get OOB with therapy. ModA for sit<>stand and stand-pivot to chair with B HHA. Pt c/o B foot pain which limits her ability to WB. PT will follow during acute stay.      Plan    Recommend Physical Therapy 2 times per week until therapy goals are met for the following treatments:  Bed Mobility, Neuro Re-Education / Balance, Therapeutic Activities and Therapeutic Exercises    DC Equipment Recommendations: Unable to determine at this time  Discharge Recommendations: Recommend post-acute placement for additional physical therapy services prior to discharge home       Subjective    Pt agreeable to PT. \"I haven't been on my feet in a week because they hurt so bad.\"     Objective       12/29/20 1150   Prior Living Situation   Prior Services Continuous (24 Hour) Care Giving Family   Housing / Facility 1 Story House   Steps Into Home 0   Steps In Home 0   Equipment Owned Wheelchair;Slide Board;4-Wheel Walker;Ramp   Lives with - Patient's Self Care Capacity Other (Comments)  (Grandmother)   Comments Pt's grandma provides 24/7 CG assist   Prior Level of Functional Mobility   Bed Mobility Independent   Transfer Status Required Assist   Ambulation Required Assist   Distance Ambulation (Feet) 5   Assistive Devices Used 4-Wheel Walker   Wheelchair Required Assist   Stairs Dependent   Comments Pt mostly non-ambulatory, transfers only   History of Falls   History of Falls Yes   Date of Last Fall 09/29/20   Cognition    Cognition / " Consciousness WDL   Level of Consciousness Alert   Comments Pt cooperative and self-limiting   Passive ROM Lower Body   Passive ROM Lower Body WDL   Active ROM Lower Body    Active ROM Lower Body  X   Gross Active ROM Gross Active Range of Motion Impaired,  but Appears Adequate for Functional Mobility.   Strength Lower Body   Lower Body Strength  X   Gross Strength Generalized Weakness, Equal Bilaterally   Sensation Lower Body   Lower Extremity Sensation   Not Tested   Coordination Lower Body    Coordination Lower Body  WDL   Balance Assessment   Sitting Balance (Static) Fair +   Sitting Balance (Dynamic) Fair   Standing Balance (Static) Poor   Standing Balance (Dynamic) Poor -   Weight Shift Sitting Fair   Weight Shift Standing Poor   Comments with B HHA   Gait Analysis   Gait Level Of Assist Unable to Participate   Bed Mobility    Supine to Sit Supervised   Scooting Supervised   Comments HOB elevated, pt using rails   Functional Mobility   Sit to Stand Moderate Assist   Bed, Chair, Wheelchair Transfer Moderate Assist   Transfer Method Stand Step   Mobility supine>EOB>recliner   How much help from another person does the patient currently need...   6 clicks Mobility Score 14   Activity Tolerance   Sitting Edge of Bed 6 min   Standing 1 min   Edema / Skin Assessment   Edema / Skin  Not Assessed   Patient / Family Goals    Patient / Family Goal #1 walk   Short Term Goals    Short Term Goal # 1 Pt will be SPV for sit<>stand transfers in 6 txs to be able to begin gait training.   Short Term Goal # 2 Pt will be SPV for all transfers with LRAD.in 6 txs to improve oob activity.   Education Group   Role of Physical Therapist Patient Response Patient;Acceptance;Explanation;Verbal Demonstration;Reinforcement Needed   Problem List    Problems Impaired Transfers;Impaired Ambulation;Functional Strength Deficit;Impaired Balance;Decreased Activity Tolerance;Safety Awareness Deficits / Cognition;Pain   Anticipated Discharge  Equipment and Recommendations   DC Equipment Recommendations Unable to determine at this time   Discharge Recommendations Recommend post-acute placement for additional physical therapy services prior to discharge home

## 2020-12-29 NOTE — THERAPY
"Occupational Therapy   Initial Evaluation     Patient Name: Kristin Balderrama  Age:  30 y.o., Sex:  female  Medical Record #: [id number]  Today's Date: 12/29/2020     Precautions  Precautions: (P) Fall Risk    Assessment  Patient is 30 y.o. female who presents to acute due to UTI. Pt is well known to this therapist and is dependent for dressing, toileting, and bathing. Pt reports she is now stand pivoting to w/c and commode, her Grandmother provides around the clock care for her. If grandmother cannot provide level of assist currently required then pt will require placement. From an OT perspective pt is close to her baseline. Will remain available for DC needs only.    Plan    Recommend Occupational Therapy DC needs only for treatments including  Adaptive Equipment, Neuro Re-Education / Balance, Self Care/Activities of Daily Living, Therapeutic Activities and Therapeutic Exercises.    DC Equipment Recommendations: (P) Unable to determine at this time  Discharge Recommendations: (P) Recommend home health for continued occupational therapy services(if grandma can't provide current level of assist SNF)     Subjective    \"If I do too well then I can't go to rehab\"  \"I am bed bound now\"     Objective       12/29/20 1155   Total Time Spent   Total Time Spent (Mins) 32   Charge Group   OT Evaluation OT Evaluation High   Initial Contact Note    Initial Contact Note Order Received and Verified, Occupational Therapy Evaluation in Progress with Full Report to Follow.   Prior Living Situation   Prior Services Continuous (24 Hour) Care Giving Family   Housing / Facility 1 Story House   Bathroom Set up   (bedbaths)   Equipment Owned Front-Wheel Walker;Wheelchair;Slide Board;Bed Side Commode   Lives with - Patient's Self Care Capacity   (grandmother and aunt, reports aunt does not assist)   Comments pt is dependent on grandmother for assist   Prior Level of ADL Function   Self Feeding Independent   Grooming / Hygiene Independent "   Bathing Dependent   Dressing Dependent   Toileting Dependent   Prior Level of IADL Function   Prior Level Of Mobility Independent With Device in Home   Driving / Transportation Relatives / Others Provide Transportation   Precautions   Precautions Fall Risk   Vitals   O2 (LPM) 0   O2 Delivery Device None - Room Air   Pain 0 - 10 Group   Therapist Pain Assessment Post Activity Pain Same as Prior to Activity;Nurse Notified  (reported pain in feet due and abd, medicated)   Cognition    Cognition / Consciousness WDL   Level of Consciousness Alert   Comments continues to perseverate on not doing well so she can go to rehab   Active ROM Upper Body   Comments WFL   Strength Upper Body   Comments WFL   Coordination Upper Body   Comments WFL   Balance Assessment   Sitting Balance (Static) Fair +   Sitting Balance (Dynamic) Fair   Standing Balance (Static) Poor   Standing Balance (Dynamic) Poor -   Weight Shift Sitting Fair   Weight Shift Standing Poor   Comments w/ B UE support   Bed Mobility    Supine to Sit Supervised   Sit to Supine   (NT in chair post )   Scooting Supervised   ADL Assessment   Grooming Supervision;Seated   Upper Body Dressing Minimal Assist  (gown)   Lower Body Dressing Maximal Assist  (socks, baseline)   How much help from another person does the patient currently need...   Putting on and taking off regular lower body clothing? 1   Bathing (including washing, rinsing, and drying)? 1   Toileting, which includes using a toilet, bedpan, or urinal? 1   Putting on and taking off regular upper body clothing? 3   Taking care of personal grooming such as brushing teeth? 3   Eating meals? 4   6 Clicks Daily Activity Score 13   Functional Mobility   Sit to Stand Moderate Assist   Bed, Chair, Wheelchair Transfer Moderate Assist   Mobility SPT EOB > Chair    Edema / Skin Assessment   Edema / Skin  Not Assessed   Activity Tolerance   Sitting in Chair 10 min+ (up post)   Sitting Edge of Bed 6 min   Standing 1 min    Patient / Family Goals   Patient / Family Goal #1 to get stronger   Short Term Goals   Short Term Goal # 1 Pt will demo SPT to BSC w/ SPV   Education Group   Role of Occupational Therapist Patient Response Patient;Acceptance;Explanation;Demonstration;Verbal Demonstration   Problem List   Problem List Decreased Active Daily Living Skills;Decreased Homemaking Skills;Decreased Functional Mobility;Decreased Activity Tolerance;Impaired Postural Control / Balance   Anticipated Discharge Equipment and Recommendations   DC Equipment Recommendations Unable to determine at this time   Discharge Recommendations Recommend home health for continued occupational therapy services  (if grandma can't provide current level of assist SNF)   Interdisciplinary Plan of Care Collaboration   IDT Collaboration with  Nursing;Physical Therapist   Patient Position at End of Therapy Seated;Chair Alarm On;Call Light within Reach;Tray Table within Reach;Phone within Reach   Collaboration Comments report given   Session Information   Date / Session Number  12/29, 1 (1/1, 1/4)    Priority 1

## 2020-12-29 NOTE — DISCHARGE PLANNING
Anticipated Discharge Disposition: SNF    Action: spoke to pt at bedside, pt requests RNCM contact mom to discuss dc plan. Spoke to pts mom EmmyEmmy houser is amenable to SNF to complete abx therapy. Emmy gave verbal choice for SNF, SNF choice faxed to Terri LANIER.     Barriers to Discharge: SNF acceptance    Plan: f/u with medical team, pt, pts mom, CCA

## 2020-12-29 NOTE — DISCHARGE PLANNING
Received Choice form at 0980  Agency/Facility Name: [facility]  Referral sent per Choice form @ 0033

## 2020-12-29 NOTE — TELEPHONE ENCOUNTER
[facility] called concerning patients Trulicity. They state the quantity was for 0.5 however to last 28 days they need it for 2 ml. Please advise...

## 2020-12-29 NOTE — PROGRESS NOTES
Hospital Medicine Daily Progress Note    Date of Service  12/29/2020    Chief Complaint  30 y.o. female admitted 12/25/2020 with N/V    Hospital Course  30 y.o. female who presented 12/25/2020 with abdominal pain, nausea, vomiting, fever, chills. Patient was recently seen in [place] [address] and [facility] where she was diagnosed with UTI and discharged home on Bactrim. Patient has longstanding suprapubic jara catheter. She reports compliance to bactrim but developed nausea and vomiting that she can no longer tolerate PO intake and came to ED. Patient has history of multiple UTI with ESBL. Her urine grew ESBL Klebsiella. Dr. Guzman with ID recommended completing 5 days of meropenem for presumed pyelonephritis after her suprapubic cath was exchanged.    Interval Problem Update  She complains of abd pain and nausea, though this is chronic for her. She use to have a pain management specialist, but no longer see them.    Consultants/Specialty  ID    Code Status  Full Code    Disposition  SNF for IV antibiotics    Review of Systems  Review of Systems   Constitutional: Negative for fever.   HENT:        Mouth sores   Respiratory: Negative for shortness of breath.    Cardiovascular: Negative for chest pain.   Gastrointestinal: Positive for abdominal pain and nausea.   Genitourinary: Positive for flank pain. Negative for dysuria.   Musculoskeletal: Positive for back pain.   Neurological: Negative for dizziness.   Psychiatric/Behavioral: The patient is not nervous/anxious.         Physical Exam  Temp:  [36 °C (96.8 °F)-36.3 °C (97.3 °F)] 36.1 °C (97 °F)  Pulse:  [63-74] 66  Resp:  [16-18] 17  BP: (108-121)/(54-72) 121/72  SpO2:  [92 %-100 %] 100 %    Physical Exam  Vitals signs and nursing note reviewed.   Constitutional:       General: She is not in acute distress.     Appearance: She is obese. She is not toxic-appearing.   HENT:      Mouth/Throat:      Mouth: Mucous membranes are moist.   Eyes:      General:          Right eye: No discharge.         Left eye: No discharge.   Neck:      Musculoskeletal: Neck supple.   Cardiovascular:      Rate and Rhythm: Normal rate and regular rhythm.   Pulmonary:      Effort: Pulmonary effort is normal. No respiratory distress.      Breath sounds: No wheezing or rales.   Abdominal:      Palpations: Abdomen is soft.      Tenderness: There is abdominal tenderness (diffuse. mild). There is no guarding or rebound.      Comments: Suprapubic cath in place   Musculoskeletal:         General: No swelling.   Skin:     General: Skin is warm and dry.   Neurological:      Mental Status: She is alert and oriented to person, place, and time.         Fluids    Intake/Output Summary (Last 24 hours) at 12/29/2020 1349  Last data filed at 12/28/2020 1900  Gross per 24 hour   Intake --   Output 650 ml   Net -650 ml       Laboratory  Recent Labs     12/27/20  0102 12/28/20  1208   WBC 3.7* 4.8   RBC 3.91* 4.10*   HEMOGLOBIN 11.5* 12.0   HEMATOCRIT 37.7 39.4   MCV 96.4 96.1   MCH 29.4 29.3   MCHC 30.5* 30.5*   RDW 53.1* 52.9*   PLATELETCT 87* 165   MPV 12.4 11.3     Recent Labs     12/27/20  0102 12/28/20  1208 12/29/20  0400   SODIUM 137 141 139   POTASSIUM 3.7 3.9 3.7   CHLORIDE 114* 118* 114*   CO2 12* 13* 16*   GLUCOSE 88 100* 98   BUN 5* 4* 3*   CREATININE 0.53 0.56 0.59   CALCIUM 8.5 8.7 8.0*                   Imaging  US-RENAL   Final Result      No hydronephrosis      Right nephrolithiasis on CT is not detected sonographically      IR-PICC LINE PLACEMENT W/ GUIDANCE > AGE 5   Final Result                  Ultrasound-guided PICC placement performed by qualified nursing staff as    above.          IR-MIDLINE CATHETER INSERTION WO GUIDANCE > AGE 3   Final Result                  Ultrasound-guided midline placement performed by qualified nursing staff    as above.          CT-HEAD W/O   Final Result      No acute intracranial abnormality.           Assessment/Plan  Complicated UTI (urinary tract infection)-  (present on admission)  Assessment & Plan  Failed outpatient therapy  klebsiella ESBL.  ID consulted, appreciate recs. Switched to meropenem. PICC placed for better access.   Renal US normal, but presumed pyelonephritis given her flank pain  Plan for meropenem 5 days after cath was exchanged, I will clarify with nursing    Diarrhea of presumed infectious origin- (present on admission)  Assessment & Plan  Recently on bactrim and reports loose watery diarrhea. Electrolytes stable.  C.diff negative.  Continue Loperamide prn.    Metabolic acidosis- (present on admission)  Assessment & Plan  Lactate, beta-hydroxybutyrate wnl.  May be 2/2 diamox.  Monitor.    Diabetes mellitus type 2 in obese (HCC)- (present on admission)  Assessment & Plan  Recent A1c 5.1.   Insulin SS, POCT glucose, hypoglycemia protocol.    Immunocompromised (HCC)- (present on admission)  Assessment & Plan  Continue home medications.    Depression- (present on admission)  Assessment & Plan  Stable.   Continue home meds.    Anxiety- (present on admission)  Assessment & Plan  Stable.  Continue home meds.       VTE prophylaxis: lovenox

## 2020-12-29 NOTE — PROGRESS NOTES
ID BRIEF NOTE     Reviewed chart including vitals and labs.  Renal ultrasound was done with no mention of pyelonephritis but only commented on hydronephrosis.  Discussed need for suprapubic catheter exchange with hospitalist yesterday.    --- Continue meropenem, once catheter is exchanged give an additional 5-day course.  Although no mention of pyelonephritis the patient reported she had nausea and vomiting and is complaining of bilateral flank pain.  Due to this we will not utilize fosfomycin but will instead give meropenem course.  --- Continue to monitor vitals and lab    ID will sign off.       Cande Guzman MD  Infectious Diseases

## 2020-12-29 NOTE — PROCEDURES
Vascular Access Team     Date of Insertion: 12/28/2020  Arm Circumference: 43  Internal length: 44  External Length: 0  Vein Occupancy %: 32   Reason for PICC: access  Labs: WBC 4.8, , BUN 4, Cr 0.56, GFR >60, INR n/a     Consents confirmed, vessel patency confirmed with ultrasound. Risks and benefits of procedure explained to patient and education regarding central line associated bloodstream infections provided. Questions answered.      PICC placed in LUE per licensed provider order with ultrasound guidance.  4 Fr, 1 lumen PICC placed in brachial vein after 2 attempt(s). 2 mL of 1% lidocaine injected intradermally, 21 gauge microintroducer needle and modified Seldinger technique used. 44 cm catheter inserted with good blood return. Secured at 0 cm marker. Each lumen flushed without resistance with 10 mL 0.9% normal saline. PICC line secured with Biopatch and Tegaderm.     PICC tip placement location is confirmed by nurse to be in the Superior Vena Cava (SVC) utilizing 3CG technology. PICC line is appropriate for use at this time. Patient tolerated procedure well, without complications.  Patient condition relayed to unit RN or ordering physician via this post procedure note in the EMR.      Ultrasound images uploaded to PACS and viewable in the EMR - yes  Ultrasound imaged printed and placed in paper chart - no     BARD Power PICC ref # [id number], Lot # [id number], Expiration Date 5/31/2021

## 2020-12-29 NOTE — CARE PLAN
Problem: Safety  Goal: Will remain free from injury  Outcome: PROGRESSING AS EXPECTED   Bed locked in lowest position. Personal items and call light within reach. Pt educated to call for assistance.  Problem: Pain Management  Goal: Pain level will decrease to patient's comfort goal  Outcome: PROGRESSING AS EXPECTED   Pt medicated as prescribed.  Problem: Skin Integrity  Goal: Risk for impaired skin integrity will decrease  Outcome: PROGRESSING AS EXPECTED   Skin intact.

## 2020-12-30 VITALS
OXYGEN SATURATION: 93 % | HEIGHT: 65 IN | SYSTOLIC BLOOD PRESSURE: 99 MMHG | HEART RATE: 81 BPM | BODY MASS INDEX: 40.65 KG/M2 | WEIGHT: 244 LBS | TEMPERATURE: 97.1 F | DIASTOLIC BLOOD PRESSURE: 57 MMHG | RESPIRATION RATE: 15 BRPM

## 2020-12-30 LAB
ANION GAP SERPL CALC-SCNC: 8 MMOL/L (ref 7–16)
BACTERIA BLD CULT: NORMAL
BACTERIA BLD CULT: NORMAL
BASOPHILS # BLD AUTO: 0.5 % (ref 0–1.8)
BASOPHILS # BLD: 0.02 K/UL (ref 0–0.12)
BUN SERPL-MCNC: 3 MG/DL (ref 8–22)
CALCIUM SERPL-MCNC: 8.1 MG/DL (ref 8.5–10.5)
CHLORIDE SERPL-SCNC: 121 MMOL/L (ref 96–112)
CO2 SERPL-SCNC: 16 MMOL/L (ref 20–33)
CREAT SERPL-MCNC: 0.46 MG/DL (ref 0.5–1.4)
EKG IMPRESSION: NORMAL
EOSINOPHIL # BLD AUTO: 0.17 K/UL (ref 0–0.51)
EOSINOPHIL NFR BLD: 4.6 % (ref 0–6.9)
ERYTHROCYTE [DISTWIDTH] IN BLOOD BY AUTOMATED COUNT: 54.1 FL (ref 35.9–50)
GLUCOSE SERPL-MCNC: 84 MG/DL (ref 65–99)
HCT VFR BLD AUTO: 32.4 % (ref 37–47)
HGB BLD-MCNC: 10.1 G/DL (ref 12–16)
IMM GRANULOCYTES # BLD AUTO: 0 K/UL (ref 0–0.11)
IMM GRANULOCYTES NFR BLD AUTO: 0 % (ref 0–0.9)
LYMPHOCYTES # BLD AUTO: 1.29 K/UL (ref 1–4.8)
LYMPHOCYTES NFR BLD: 34.9 % (ref 22–41)
MAGNESIUM SERPL-MCNC: 1.6 MG/DL (ref 1.5–2.5)
MCH RBC QN AUTO: 29.6 PG (ref 27–33)
MCHC RBC AUTO-ENTMCNC: 31.2 G/DL (ref 33.6–35)
MCV RBC AUTO: 95 FL (ref 81.4–97.8)
MONOCYTES # BLD AUTO: 0.21 K/UL (ref 0–0.85)
MONOCYTES NFR BLD AUTO: 5.7 % (ref 0–13.4)
NEUTROPHILS # BLD AUTO: 2.01 K/UL (ref 2–7.15)
NEUTROPHILS NFR BLD: 54.3 % (ref 44–72)
NRBC # BLD AUTO: 0 K/UL
NRBC BLD-RTO: 0 /100 WBC
PLATELET # BLD AUTO: 145 K/UL (ref 164–446)
PMV BLD AUTO: 11.4 FL (ref 9–12.9)
POTASSIUM SERPL-SCNC: 3.8 MMOL/L (ref 3.6–5.5)
RBC # BLD AUTO: 3.41 M/UL (ref 4.2–5.4)
SIGNIFICANT IND 70042: NORMAL
SIGNIFICANT IND 70042: NORMAL
SITE SITE: NORMAL
SITE SITE: NORMAL
SODIUM SERPL-SCNC: 145 MMOL/L (ref 135–145)
SOURCE SOURCE: NORMAL
SOURCE SOURCE: NORMAL
WBC # BLD AUTO: 3.7 K/UL (ref 4.8–10.8)

## 2020-12-30 PROCEDURE — 700111 HCHG RX REV CODE 636 W/ 250 OVERRIDE (IP): Performed by: INTERNAL MEDICINE

## 2020-12-30 PROCEDURE — A9270 NON-COVERED ITEM OR SERVICE: HCPCS | Performed by: INTERNAL MEDICINE

## 2020-12-30 PROCEDURE — 700102 HCHG RX REV CODE 250 W/ 637 OVERRIDE(OP): Performed by: INTERNAL MEDICINE

## 2020-12-30 PROCEDURE — 700111 HCHG RX REV CODE 636 W/ 250 OVERRIDE (IP): Performed by: NURSE PRACTITIONER

## 2020-12-30 PROCEDURE — 93010 ELECTROCARDIOGRAM REPORT: CPT | Performed by: INTERNAL MEDICINE

## 2020-12-30 PROCEDURE — 80048 BASIC METABOLIC PNL TOTAL CA: CPT

## 2020-12-30 PROCEDURE — 99239 HOSP IP/OBS DSCHRG MGMT >30: CPT | Performed by: INTERNAL MEDICINE

## 2020-12-30 PROCEDURE — 83735 ASSAY OF MAGNESIUM: CPT

## 2020-12-30 PROCEDURE — 85025 COMPLETE CBC W/AUTO DIFF WBC: CPT

## 2020-12-30 PROCEDURE — 700105 HCHG RX REV CODE 258: Performed by: INTERNAL MEDICINE

## 2020-12-30 RX ORDER — DULAGLUTIDE 3 MG/.5ML
3 INJECTION, SOLUTION SUBCUTANEOUS
Qty: 2 ML | Refills: 6 | Status: SHIPPED
Start: 2020-12-30 | End: 2021-02-03

## 2020-12-30 RX ADMIN — POTASSIUM CHLORIDE 40 MEQ: 1500 TABLET, EXTENDED RELEASE ORAL at 05:52

## 2020-12-30 RX ADMIN — ACETAZOLAMIDE 500 MG: 500 CAPSULE, EXTENDED RELEASE ORAL at 05:51

## 2020-12-30 RX ADMIN — BUSPIRONE HYDROCHLORIDE 15 MG: 10 TABLET ORAL at 05:53

## 2020-12-30 RX ADMIN — OXYCODONE 5 MG: 5 TABLET ORAL at 01:19

## 2020-12-30 RX ADMIN — SODIUM BICARBONATE 650 MG: 650 TABLET ORAL at 05:50

## 2020-12-30 RX ADMIN — TOPIRAMATE 50 MG: 25 TABLET, FILM COATED ORAL at 05:53

## 2020-12-30 RX ADMIN — OXYBUTYNIN CHLORIDE 5 MG: 5 TABLET ORAL at 05:53

## 2020-12-30 RX ADMIN — ACETAMINOPHEN 650 MG: 325 TABLET ORAL at 11:39

## 2020-12-30 RX ADMIN — MEROPENEM 500 MG: 500 INJECTION, POWDER, FOR SOLUTION INTRAVENOUS at 00:59

## 2020-12-30 RX ADMIN — MYCOPHENOLATE MOFETIL 500 MG: 250 CAPSULE ORAL at 05:50

## 2020-12-30 RX ADMIN — MEROPENEM 500 MG: 500 INJECTION, POWDER, FOR SOLUTION INTRAVENOUS at 11:40

## 2020-12-30 RX ADMIN — ENOXAPARIN SODIUM 40 MG: 40 INJECTION SUBCUTANEOUS at 05:50

## 2020-12-30 RX ADMIN — LEVOTHYROXINE SODIUM 75 MCG: 0.07 TABLET ORAL at 05:52

## 2020-12-30 RX ADMIN — OXCARBAZEPINE 300 MG: 300 TABLET, FILM COATED ORAL at 05:50

## 2020-12-30 RX ADMIN — OMEPRAZOLE 20 MG: 20 CAPSULE, DELAYED RELEASE ORAL at 05:50

## 2020-12-30 RX ADMIN — BUSPIRONE HYDROCHLORIDE 15 MG: 10 TABLET ORAL at 11:39

## 2020-12-30 RX ADMIN — OXYCODONE 5 MG: 5 TABLET ORAL at 05:49

## 2020-12-30 RX ADMIN — FLUOXETINE 40 MG: 20 CAPSULE ORAL at 05:52

## 2020-12-30 RX ADMIN — KETOROLAC TROMETHAMINE 30 MG: 30 INJECTION, SOLUTION INTRAMUSCULAR at 00:59

## 2020-12-30 RX ADMIN — OXYCODONE 5 MG: 5 TABLET ORAL at 09:51

## 2020-12-30 RX ADMIN — ZIPRASIDONE HYDROCHLORIDE 40 MG: 40 CAPSULE ORAL at 05:50

## 2020-12-30 RX ADMIN — MEROPENEM 500 MG: 500 INJECTION, POWDER, FOR SOLUTION INTRAVENOUS at 06:02

## 2020-12-30 RX ADMIN — ASPIRIN 81 MG: 81 TABLET, COATED ORAL at 05:52

## 2020-12-30 RX ADMIN — OXYBUTYNIN CHLORIDE 5 MG: 5 TABLET ORAL at 11:39

## 2020-12-30 RX ADMIN — ACETAMINOPHEN 650 MG: 325 TABLET ORAL at 00:59

## 2020-12-30 RX ADMIN — ACETAMINOPHEN 650 MG: 325 TABLET ORAL at 05:52

## 2020-12-30 RX ADMIN — IVABRADINE 7.5 MG: 7.5 TABLET, FILM COATED ORAL at 08:49

## 2020-12-30 ASSESSMENT — PAIN DESCRIPTION - PAIN TYPE
TYPE: ACUTE PAIN

## 2020-12-30 NOTE — DISCHARGE SUMMARY
Discharge Summary    CHIEF COMPLAINT ON ADMISSION  Chief Complaint   Patient presents with   • Nausea/Vomiting/Diarrhea     pt RX Bactrim on monday for a UTI. pt has suprapubic catheter. she's had n/v/d since starting ABX       Reason for Admission  EMS     CODE STATUS  Full Code    HPI & HOSPITAL COURSE  This is a 30 y.o. female here with transverse myelitis, chronic relapsing inflammatory optic neuropathy, suprapubic cath, DM, asthma, recurrent UTIs who presented with abd pain and N/V. Patient was recently seen in [address] where she was diagnosed with UTI and dischraged home on Bactrim. She reports compliance to bactrim but developed nausea and vomiting that she can no longer tolerate PO intake and came to ED.  Her urine culture grew ESBL Klebsiella. She had renal US that did not show hydronephrosis but she complained of flank pain and treated for presumed pyelonephritis. ID was consulted, Dr. Guzman recommends meropenem until 1/1/21. Patient had her suprapubic cath exchanged 12/27/20. PICC line was placed.  She had diarrhea, C diff testing was negative.  Of note, patient has chronic abdominal pain and nausea. She used to have pain management specialist, she has since been weaned off all outpatient opioids.       Therefore, she is discharged in good and stable condition to skilled nursing facility.    The patient met 2-midnight criteria for an inpatient stay at the time of discharge.      FOLLOW UP ITEMS POST DISCHARGE  meropenem until 1/1/21    DISCHARGE DIAGNOSES  Active Problems:    Diarrhea of presumed infectious origin POA: Yes    Complicated UTI (urinary tract infection) POA: Yes    Diabetes mellitus type 2 in obese (HCC) POA: Yes    Metabolic acidosis POA: Yes    Anxiety POA: Yes    Depression POA: Yes    Immunocompromised (HCC) POA: Yes  Resolved Problems:    * No resolved hospital problems. *      FOLLOW UP  Future Appointments   Date Time Provider Department Center    1/4/2021  2:20 PM John Leon M.D. [facility] None   1/27/2021  9:30 AM Sue Preston M.D. [facility]   2/17/2021  9:00 AM La Lutz M.D. [facility] [address]   3/22/2021  8:40 AM JACOB Husain [facility] [address]     [facility]  [address] [place] [id number]  [phone]          MEDICATIONS ON DISCHARGE     Medication List      START taking these medications      Instructions   NS SOLN 100 mL with meropenem 500 MG SOLR 1,000 mg   Infuse 1,000 mg into a venous catheter every 6 hours.  Dose: 1,000 mg        CHANGE how you take these medications      Instructions   OXcarbazepine 300 MG Tabs  What changed: Another medication with the same name was removed. Continue taking this medication, and follow the directions you see here.  Commonly known as: Trileptal   Take 1 Tab by mouth 2 times a day.  Dose: 300 mg     traZODone 100 MG Tabs  What changed:   · when to take this  · Another medication with the same name was removed. Continue taking this medication, and follow the directions you see here.  Commonly known as: DESYREL   Take 1 Tab by mouth every bedtime.  Dose: 100 mg        CONTINUE taking these medications      Instructions   acetaminophen 500 MG Tabs  Commonly known as: TYLENOL   Take 1,000 mg by mouth every 6 hours as needed for Moderate Pain.  Dose: 1,000 mg     acetaZOLAMIDE  MG Cp12  Commonly known as: DIAMOX   Take 500 mg by mouth 2 times a day.  Dose: 500 mg     albuterol 108 (90 Base) MCG/ACT Aers inhalation aerosol   Inhale 2 Puffs every 6 hours as needed for Shortness of Breath.  Dose: 2 Puff     aspirin EC 81 MG Tbec  Commonly known as: ECOTRIN   Take 81 mg by mouth every day.  Dose: 81 mg     busPIRone 15 MG tablet  Commonly known as: BUSPAR   Take 1 tablet by mouth 3 times a day     calcium carbonate 750 MG chewable tablet  Commonly known as: TUMS EX   Take 2 Tabs by mouth every day.  Dose: 1,500 mg     Corlanor 7.5 MG Tabs tablet  Generic  drug: ivabradine   Take 7.5 mg by mouth 2 times a day, with meals.  Dose: 7.5 mg     fluoxetine 40 MG capsule  Commonly known as: PROZAC   Take 1 Cap by mouth every day.  Dose: 40 mg     gabapentin 300 MG Caps  Commonly known as: NEURONTIN   Take 300 mg by mouth every evening.  Dose: 300 mg     insulin glargine 100 UNIT/ML Soln  Commonly known as: Lantus   Inject 8 Units as instructed every evening.  Dose: 8 Units     ipratropium-albuterol 0.5-2.5 (3) MG/3ML nebulizer solution  Commonly known as: DUONEB   Take 3 mL by nebulization every four hours as needed for Shortness of Breath. Nebulizer  Dose: 3 mL     levothyroxine 75 MCG Tabs  Commonly known as: SYNTHROID   Take 1 Tab by mouth Every morning on an empty stomach.  Dose: 75 mcg     methocarbamol 750 MG Tabs  Commonly known as: ROBAXIN   Take 750 mg by mouth every bedtime. Indications: Musculoskeletal Pain  Dose: 750 mg     mycophenolate 500 MG tablet  Commonly known as: CellCept   Take 2 Tabs by mouth 2 times a day.  Dose: 1,000 mg     Myrbetriq 50 MG Tb24  Generic drug: Mirabegron ER   Take 50 mg by mouth every day.  Dose: 50 mg     omeprazole 20 MG delayed-release capsule  Commonly known as: PRILOSEC   Take 1 Cap by mouth every day.  Dose: 20 mg     ondansetron 4 MG Tbdp  Commonly known as: ZOFRAN ODT   Take 1 Tab by mouth every 6 hours as needed for Nausea.  Dose: 4 mg     oxybutynin 5 MG Tabs  Commonly known as: DITROPAN   Take 1 Tab by mouth 3 times a day.  Dose: 5 mg     polyethylene glycol 3350 17 GM/SCOOP Powd  Commonly known as: MiraLax   Take 17 g by mouth 2 times a day for 30 days.  Dose: 17 g     potassium chloride SA 20 MEQ Tbcr  Commonly known as: Kdur   Take 40 mEq by mouth 2 times a day.  Dose: 40 mEq     sodium bicarbonate 325 MG Tabs   Take 650 mg by mouth 2 times a day.  Dose: 650 mg     topiramate 50 MG tablet  Commonly known as: TOPAMAX   Take 50 mg by mouth 2 times a day.  Dose: 50 mg     Trulicity 3 MG/0.5ML Sopn  Generic drug:  "Dulaglutide   Inject 3 mg under the skin every 7 days.  Dose: 3 mg     vitamin D (Ergocalciferol) 1.25 MG (18773 UT) Caps capsule  Commonly known as: DRISDOL   Take 50,000 Units by mouth every 7 days. On Friday  Dose: 50,000 Units     ziprasidone 40 MG Caps  Commonly known as: GEODON   Take 1 tablet by mouth twice a day        STOP taking these medications    sulfamethoxazole-trimethoprim 800-160 MG tablet  Commonly known as: BACTRIM DS            Allergies  Allergies   Allergen Reactions   • Depakote [Divalproex Sodium] Unspecified     Muscle spasms/muscle pain and weakness     • Amitriptyline Unspecified     Headaches     • Aripiprazole [Abilify] Unspecified     Headaches/muscle twitching     • Clindamycin Nausea     Even with food     • Flagyl [Metronidazole Hcl] Unspecified     \"eye problems\"     • Flomax [Tamsulosin Hydrochloride] Swelling   • Levaquin Unspecified     Severe muscle cramps in legs  RXN=unknown   • Metformin Unspecified     Increased lactic acid      • Tape Rash     Tears skin off  coban with Tegaderm tape ok intermittently  RXN=ongoing   • Vancomycin Itching     Pt becomes flushed in face and chest.   RXN=7/10/16   • Wound Dressing Adhesive Hives     By pt report   • Ciprofloxacin    • Hydromorphone Hcl      Pt states she feels loopy   • Keflex Rash     Pt states she gets a rash with this medication  Tolerates ceftriaxone   • Levofloxacin Unspecified     Leg muscle cramps   • Metronidazole Rash     .   • Penicillins Hives   • Tamsulosin Swelling   • Valproic Acid Rash     .       DIET  Orders Placed This Encounter   Procedures   • Diet Order Diet: Full Liquid     Standing Status:   Standing     Number of Occurrences:   1     Order Specific Question:   Diet:     Answer:   Full Liquid [11]       ACTIVITY  As tolerated.  Weight bearing as tolerated    LINES, DRAINS, AND WOUNDS  This is an automated list. Peripheral IVs will be removed prior to discharge.  PICC Single Lumen 12/28/20 Left Brachial " (Active)   Site Assessment Clean;Dry;Intact 12/29/20 0858   Line Status Scrubbed the hub prior to access;Flushed;Blood return noted;Infusing 12/29/20 0858   Line Secured at (cm) 0 cm 12/28/20 2100   Extremity Circumference (cm) 43 cm 12/28/20 1622   Dressing Type Biopatch;Securing device;Skin barrier;Transparent 12/29/20 0858   Dressing Status Clean;Dry;Intact 12/29/20 0858   Dressing Intervention N/A 12/29/20 0858   Dressing Change Due 01/02/21 12/28/20 2100   Date Primary Tubing Changed 12/29/20 12/30/20 0200   Date Secondary Tubing Changed 12/30/20 12/30/20 0200   Date IV Connector(s) Changed 12/28/20 12/28/20 1622   NEXT Primary Tubing Change  12/31/20 12/30/20 0200   NEXT Secondary Tubing Change  12/31/20 12/30/20 0200   NEXT IV Connector(s) Change 12/29/20 12/28/20 2100   Line Necessity Assessed Lack of Peripheral Access 12/29/20 0858   $ Single Lumen PICC Charge Single kit used 12/28/20 1622     Suprapubic Catheter Latex 14 Fr. (Active)   Site Assessment Pink;Painful;Red 12/29/20 2030   Dressing Status Other (Comment) 12/28/20 0835   Dressing Type Open to air 12/29/20 2030   Collection Container Standard drainage bag 12/29/20 2030   Output (mL) 350 mL 12/28/20 1900        PICC Single Lumen 12/28/20 Left Brachial (Active)   Site Assessment Clean;Dry;Intact 12/29/20 0858   Line Status Scrubbed the hub prior to access;Flushed;Blood return noted;Infusing 12/29/20 0858   Line Secured at (cm) 0 cm 12/28/20 2100   Extremity Circumference (cm) 43 cm 12/28/20 1622   Dressing Type Biopatch;Securing device;Skin barrier;Transparent 12/29/20 0858   Dressing Status Clean;Dry;Intact 12/29/20 0858   Dressing Intervention N/A 12/29/20 0858   Dressing Change Due 01/02/21 12/28/20 2100   Date Primary Tubing Changed 12/29/20 12/30/20 0200   Date Secondary Tubing Changed 12/30/20 12/30/20 0200   Date IV Connector(s) Changed 12/28/20 12/28/20 1622   NEXT Primary Tubing Change  12/31/20 12/30/20 0200   NEXT Secondary Tubing Change   12/31/20 12/30/20 0200   NEXT IV Connector(s) Change 12/29/20 12/28/20 2100   Line Necessity Assessed Lack of Peripheral Access 12/29/20 0858   $ Single Lumen PICC Charge Single kit used 12/28/20 1622                MENTAL STATUS ON TRANSFER  Level of Consciousness: Alert  Orientation : Oriented x 4  Speech: Speech Clear    CONSULTATIONS  Infectious disease    PROCEDURES  US-RENAL   Final Result      No hydronephrosis      Right nephrolithiasis on CT is not detected sonographically      IR-PICC LINE PLACEMENT W/ GUIDANCE > AGE 5   Final Result                  Ultrasound-guided PICC placement performed by qualified nursing staff as    above.          IR-MIDLINE CATHETER INSERTION WO GUIDANCE > AGE 3   Final Result                  Ultrasound-guided midline placement performed by qualified nursing staff    as above.          CT-HEAD W/O   Final Result      No acute intracranial abnormality.            LABORATORY  Lab Results   Component Value Date    SODIUM 145 12/30/2020    POTASSIUM 3.8 12/30/2020    CHLORIDE 121 (H) 12/30/2020    CO2 16 (L) 12/30/2020    GLUCOSE 84 12/30/2020    BUN 3 (L) 12/30/2020    CREATININE 0.46 (L) 12/30/2020    CREATININE 0.75 (L) 07/20/2010    GLOMRATE >59 07/20/2010        Lab Results   Component Value Date    WBC 3.7 (L) 12/30/2020    WBC 6.1 07/20/2010    HEMOGLOBIN 10.1 (L) 12/30/2020    HEMATOCRIT 32.4 (L) 12/30/2020    PLATELETCT 145 (L) 12/30/2020        Total time of the discharge process exceeds 32 minutes.

## 2020-12-30 NOTE — DISCHARGE PLANNING
Anticipated Discharge Disposition: [place]    Action: Pt accepted to [facility]. Spoke to pts mom Emmy, who is amenable and gave verbal authorization for transfer. Transport communication form faxed to Terri LANIER. Transfer packet complete, placed in chart, SONYA Ortiz notified.    Barriers to Discharge: none    Plan: transfer to [place] at 1300.

## 2020-12-30 NOTE — DISCHARGE PLANNING
Received Transport Form @   Spoke to Dayo @ [facility]  Transport is scheduled for 12/30/20 @1300 going to [place].

## 2020-12-30 NOTE — CARE PLAN
Problem: Infection  Goal: Will remain free from infection  Outcome: PROGRESSING AS EXPECTED  Note: Continuing antibiotics, afebrile, educated about handwashing and sign of infection     Problem: Pain Management  Goal: Pain level will decrease to patient's comfort goal  Outcome: PROGRESSING AS EXPECTED  Note: Educated about the pain scale and non pharmacological pain methods, check the MAR

## 2020-12-30 NOTE — PROGRESS NOTES
1850: Notified Dr. Mayfield that EKG was done. No new orders were given. Updates on patient's condition given to NOC RN.

## 2020-12-30 NOTE — PROGRESS NOTES
1830 Pt verbalized chest pain on left side, and feeling bloated. Vital signs stable.    Temperature: 97.3 temporal  Pulse: 83  Respirations:16  BP:126/76  Pulse Oxymetry: 96 RA    Pt answers questions appropriately . SONYA Mcintosh notified . Dr. Mayfield placed an order for a  STAT EKG.     1845: EKG tech at bedside.

## 2020-12-30 NOTE — DISCHARGE INSTRUCTIONS
Discharge Instructions    Discharged to other by medical transportation with escort. Discharged via wheelchair, hospital escort: Yes.  Special equipment needed: Not Applicable    Be sure to schedule a follow-up appointment with your primary care doctor or any specialists as instructed.     Discharge Plan:   Diet Plan: Discussed  Activity Level: Discussed  Confirmed Follow up Appointment: Patient to Call and Schedule Appointment  Confirmed Symptoms Management: Discussed  Medication Reconciliation Updated: Yes  Influenza Vaccine Indication: Patient Refuses    I understand that a diet low in cholesterol, fat, and sodium is recommended for good health. Unless I have been given specific instructions below for another diet, I accept this instruction as my diet prescription.   Other diet: reg    Special Instructions: None    · Is patient discharged on Warfarin / Coumadin?   No     Depression / Suicide Risk    As you are discharged from this [facility] facility, it is important to learn how to keep safe from harming yourself.    Recognize the warning signs:  · Abrupt changes in personality, positive or negative- including increase in energy   · Giving away possessions  · Change in eating patterns- significant weight changes-  positive or negative  · Change in sleeping patterns- unable to sleep or sleeping all the time   · Unwillingness or inability to communicate  · Depression  · Unusual sadness, discouragement and loneliness  · Talk of wanting to die  · Neglect of personal appearance   · Rebelliousness- reckless behavior  · Withdrawal from people/activities they love  · Confusion- inability to concentrate     If you or a loved one observes any of these behaviors or has concerns about self-harm, here's what you can do:  · Talk about it- your feelings and reasons for harming yourself  · Remove any means that you might use to hurt yourself (examples: pills, rope, extension cords, firearm)  · Get professional help from the  "community (Mental Health, Substance Abuse, psychological counseling)  · Do not be alone:Call your Safe Contact- someone whom you trust who will be there for you.  · Call your local CRISIS HOTLINE 959-3514 or 537-426-9735  · Call your local Children's Mobile Crisis Response Team [place] (656) 569-7690 or www.sciencebite  · Call the toll free National Suicide Prevention Hotlines   · National Suicide Prevention Lifeline 148-613-VZYY (7313)  · [employer] Hope Line Network 800-SUICIDE (832-8551)      ESBL Infection Information     ESBL is an enzyme that is made by some types of bacteria. This enzyme breaks down some antibiotic medicines, making them unable to kill the bacteria or treat the infection. The two most common types of bacteria that make ESBL in the stomach and intestines (gastrointestinal tract, or GI tract) are E. coli and Klebsiella. These types of bacteria help break down food so it can be used by the body for energy. Normally, they do not cause infection unless there are too many bacteria, or unless the bacteria travel to other parts of the body where they are not normally found.  ESBL infections are hard to treat. Bacteria that make ESBL are sometimes called \"super bugs\" because they are very hard to get rid of. Lab tests must be done to find the type of ESBL bacteria that is causing the infection and the right antibiotic medicine that will treat it.  What increases my risk of an ESBL infection?  You may be more likely to develop an ESBL infection if you:  · Are currently or have recently been a patient in a hospital, nursing home, or another care facility.  · Have another illness or a weakened disease-fighting system (immune system).  · Have drains or tubes placed in your body.  · Have used antibiotics in the past.  · Have sores or open wounds.  How do I get an ESBL infection?  ESBL bacteria can live outside the body on the skin or other surfaces. People who are infected with ESBL bacteria may " shed the bacteria in their stool or body fluids such as urine or blood. These bacteria can be spread to surfaces such as bed linens, medical equipment, countertops, and bathroom fixtures. The bacteria can also be spread by direct contact when health care providers touch skin or body fluids that are infected.  How is an ESBL infection diagnosed?  You may have an exam or lab testing. You may have a blood, urine, or stool sample taken. Samples can be looked at under a microscope by trained health care providers who can identify the type of bacteria that is growing in the sample. These samples can also be tested to see if certain antibiotic medicines can kill the bacteria.  Can ESBL infections be treated?  ESBL infections can be treated after the right antibiotic medicine is found that can kill the bacteria. Antibiotic medicines may be given at home or in the hospital. Some antibiotic medicine can be taken by mouth, and others need to be given through an IV.  What are some things that hospitals are doing to prevent ESBL infections?  To prevent the spread of ESBL infections, health care providers will:  · Wash their hands with soap and water or an alcohol-based hand  before they enter your room and before they leave your room.  · Clean and disinfect your room and the medical equipment that is being used for your care.  · Put a sign on your door to let visitors and health care providers know to use contact precautions when they care for you. This means health care providers will:  ? Put you in a private room.  ? Put on a gown with long sleeves and wear gloves when they care for you, then remove the gloves and gown before leaving your room.  ? Ask your visitors to wear a gown and gloves, if the visitors plan to help care for you.  ? Ask your visitors to wash or sanitize their hands before entering and before leaving your room.  ? Ask you not to leave your room to visit other areas of the hospital.  What can I do  "to help prevent ESBL infections?  You can help to prevent the spread of ESBL bacteria by:  · Reminding health care providers, cleaning staff, and visitors to wash their hands with soap and water or an alcohol-based hand  before they enter your room and before they leave your room.  · Taking antibiotics exactly as prescribed by your health care provider.  · Washing your hands with soap and water or an alcohol-based hand  after:  ? Using the bathroom.  ? Touching or coming in contact with your stool or body fluids.  ? Touching medical equipment or surfaces that may have come in contact with your stool or body fluids.  ? Touching or caring for wounds or open sores.  ? Touching or caring for tubes or drains that are placed in your body.  Can my visitors get ESBL?  Visitors are at risk for infection if they come in contact with unclean surfaces or equipment, or with your stool or body fluids while in your room. The risk of infection is higher if visitors then touch openings in their own bodies, such as their mouth or a sore. Doing that can allow the bacteria to enter their bodies. Casual contact, such as hugging or touching, will not spread ESBL.  Summary  · ESBL is an enzyme that is made by some types of bacteria.  · ESBL infections are hard to treat. Lab tests must be done to find the type of ESBL bacteria that is causing the infection and the right antibiotic medicine that will treat it.  · Bacteria that make ESBL are sometimes called \"super bugs\" because they are very hard to get rid of.  · One of the best ways to stop the spread of infection with ESBL is to wash your hands often with soap and water or an alcohol-based hand , especially after using the bathroom.  This information is not intended to replace advice given to you by your health care provider. Make sure you discuss any questions you have with your health care provider.  Document Released: 01/20/2018 Document Revised: 11/30/2018 " Document Reviewed: 01/20/2018  [employer] Patient Education © 2020 [employer]    Oxycodone tablets or capsules  What is this medicine?  OXYCODONE (ox i KOE done) is a pain reliever. It is used to treat moderate to severe pain.  This medicine may be used for other purposes; ask your health care provider or pharmacist if you have questions.  COMMON BRAND NAME(S): Dazidox, Endocodone, Oxaydo, OXECTA, OxyIR, Percolone, Roxicodone, Roxybond  What should I tell my health care provider before I take this medicine?  They need to know if you have any of these conditions:  · Cochise's disease  · brain tumor  · head injury  · heart disease  · history of drug or alcohol abuse problem  · if you often drink alcohol  · kidney disease  · liver disease  · lung or breathing disease, like asthma  · mental illness  · pancreatic disease  · seizures  · thyroid disease  · an unusual or allergic reaction to oxycodone, codeine, hydrocodone, morphine, other medicines, foods, dyes, or preservatives  · pregnant or trying to get pregnant  · breast-feeding  How should I use this medicine?  Take this medicine by mouth with a glass of water. Follow the directions on the prescription label. You can take it with or without food. If it upsets your stomach, take it with food. Take your medicine at regular intervals. Do not take it more often than directed. Do not stop taking except on your doctor's advice.  Some brands of this medicine, like Oxecta, have special instructions. Ask your doctor or pharmacist if these directions are for you: Do not cut, crush or chew this medicine. Swallow only one tablet at a time. Do not wet, soak, or lick the tablet before you take it.  A special MedGuide will be given to you by the pharmacist with each prescription and refill. Be sure to read this information carefully each time.  Talk to your pediatrician regarding the use of this medicine in children. Special care may be needed.  Overdosage: If you think you have  taken too much of this medicine contact a poison control center or emergency room at once.  NOTE: This medicine is only for you. Do not share this medicine with others.  What if I miss a dose?  If you miss a dose, take it as soon as you can. If it is almost time for your next dose, take only that dose. Do not take double or extra doses.  What may interact with this medicine?  This medicine may interact with the following medications:  · alcohol  · antihistamines for allergy, cough and cold  · antiviral medicines for HIV or AIDS  · atropine  · certain antibiotics like clarithromycin, erythromycin, linezolid, rifampin  · certain medicines for anxiety or sleep  · certain medicines for bladder problems like oxybutynin, tolterodine  · certain medicines for depression like amitriptyline, fluoxetine, sertraline  · certain medicines for fungal infections like ketoconazole, itraconazole, voriconazole  · certain medicines for migraine headache like almotriptan, eletriptan, frovatriptan, naratriptan, rizatriptan, sumatriptan, zolmitriptan  · certain medicines for nausea or vomiting like dolasetron, ondansetron, palonosetron  · certain medicines for Parkinson's disease like benztropine, trihexyphenidyl  · certain medicines for seizures like phenobarbital, phenytoin, primidone  · certain medicines for stomach problems like dicyclomine, hyoscyamine  · certain medicines for travel sickness like scopolamine  · diuretics  · general anesthetics like halothane, isoflurane, methoxyflurane, propofol  · ipratropium  · local anesthetics like lidocaine, pramoxine, tetracaine  · MAOIs like Carbex, Eldepryl, Marplan, Nardil, and Parnate  · medicines that relax muscles for surgery  · methylene blue  · nilotinib  · other narcotic medicines for pain or cough  · phenothiazines like chlorpromazine, mesoridazine, prochlorperazine, thioridazine  This list may not describe all possible interactions. Give your health care provider a list of all the  medicines, herbs, non-prescription drugs, or dietary supplements you use. Also tell them if you smoke, drink alcohol, or use illegal drugs. Some items may interact with your medicine.  What should I watch for while using this medicine?  Tell your doctor or health care professional if your pain does not go away, if it gets worse, or if you have new or a different type of pain. You may develop tolerance to the medicine. Tolerance means that you will need a higher dose of the medicine for pain relief. Tolerance is normal and is expected if you take this medicine for a long time.  Do not suddenly stop taking your medicine because you may develop a severe reaction. Your body becomes used to the medicine. This does NOT mean you are addicted. Addiction is a behavior related to getting and using a drug for a non-medical reason. If you have pain, you have a medical reason to take pain medicine. Your doctor will tell you how much medicine to take. If your doctor wants you to stop the medicine, the dose will be slowly lowered over time to avoid any side effects.  There are different types of narcotic medicines (opiates). If you take more than one type at the same time or if you are taking another medicine that also causes drowsiness, you may have more side effects. Give your health care provider a list of all medicines you use. Your doctor will tell you how much medicine to take. Do not take more medicine than directed. Call emergency for help if you have problems breathing or unusual sleepiness.  You may get drowsy or dizzy. Do not drive, use machinery, or do anything that needs mental alertness until you know how the medicine affects you. Do not stand or sit up quickly, especially if you are an older patient. This reduces the risk of dizzy or fainting spells. Alcohol may interfere with the effect of this medicine. Avoid alcoholic drinks.  This medicine will cause constipation. Try to have a bowel movement at least every 2 to  3 days. If you do not have a bowel movement for 3 days, call your doctor or health care professional.  Your mouth may get dry. Chewing sugarless gum or sucking hard candy, and drinking plenty of water may help. Contact your doctor if the problem does not go away or is severe.  What side effects may I notice from receiving this medicine?  Side effects that you should report to your doctor or health care professional as soon as possible:  · allergic reactions like skin rash, itching or hives, swelling of the face, lips, or tongue  · breathing problems  · confusion  · signs and symptoms of low blood pressure like dizziness; feeling faint or lightheaded, falls; unusually weak or tired  · trouble passing urine or change in the amount of urine  · trouble swallowing  Side effects that usually do not require medical attention (report to your doctor or health care professional if they continue or are bothersome):  · constipation  · dry mouth  · nausea, vomiting  · tiredness  This list may not describe all possible side effects. Call your doctor for medical advice about side effects. You may report side effects to FDA at 0-026-WPO-9700.  Where should I keep my medicine?  Keep out of the reach of children. This medicine can be abused. Keep your medicine in a safe place to protect it from theft. Do not share this medicine with anyone. Selling or giving away this medicine is dangerous and against the law.  Store at room temperature between 15 and 30 degrees C (59 and 86 degrees F). Protect from light. Keep container tightly closed.  This medicine may cause harm and death if it is taken by other adults, children, or pets. Return medicine that has not been used to an official disposal site. Contact the [facility] at [phone] or your [facility] government to find a site. If you cannot return the medicine, flush it down the toilet. Do not use the medicine after the expiration date.  NOTE: This sheet is a summary. It may not cover  all possible information. If you have questions about this medicine, talk to your doctor, pharmacist, or health care provider.  © 2020 [employer] (2018-04-24 16:13:10)      Suprapubic Catheter Home Guide  A suprapubic catheter is a flexible tube that is used to drain urine from the bladder into a collection bag outside the body. The catheter is inserted into the bladder through a small opening in the lower abdomen, above the pubic bone (suprapubic area) and a few inches below your belly button (navel). A tiny balloon filled with germ-free (sterile) water helps to keep the catheter in place.  The collection bag must be emptied at least once a day and cleaned at least every other day. The collection bag can be put beside your bed at night and attached to your leg during the day. You may have a large collection bag to use at night and a smaller one to use during the day.  Your suprapubic catheter may need to be changed every 4-6 weeks, or as often as recommended by your health care provider. Healing of the tract where the catheter is placed can take 6 weeks to 6 months. During that time, your health care provider may change your catheter. Once the tract is well healed, you or a caregiver will change your suprapubic catheter at home.  What are the risks?  This catheter is safe to use. However, problems can occur, including:  · Blocked urine flow. This can occur if the catheter stops working, or if you have a blood clot in your bladder or in the catheter.  · Irritation of the skin around the catheter.  · Infection. This can happen if bacteria gets into your bladder.  Supplies needed:  · Two pairs of sterile gloves.  · Paper towels.  · Catheter.  · Two syringes.  · Sterile water.  · Sterile cleaning solution.  · Lubricant.  · Collection bags.  How to change the catheter    1. Drink plenty of fluids during the hours before you change the catheter.  2. Wash your hands with soap and water. If soap and water are  not available, use hand .  3. Draw up sterile water into a syringe to have ready to fill the new catheter balloon. The amount will depend on the size of the balloon.  4. Have all of your supplies ready and close to you on a paper towel.  5. Lie on your back, sitting slightly upright so that you can see the catheter and opening.  6. Put on sterile gloves.  7. Clean the skin around the catheter opening using the sterile cleaning solution.  8. Remove the water from the balloon in the catheter using a syringe.  9. Slowly remove the catheter. If the catheter seems stuck, or if you have difficulty removing it:  ? Do not pull on it.  ? Call your health care provider right away.  10. Place the old catheter on a paper towel to discard later.  11. Take off the used gloves, and put on a new pair.  12. Put lubricant on the end of the new catheter that will go into your bladder.  13. Clean the skin around the catheter opening using the sterile cleaning solution.  14. Gently slide the catheter through the opening in your abdomen and into the tract that leads to your bladder.  15. Wait for some urine to start flowing through the catheter.  16. When urine starts to flow through the catheter, attach the collection bag to the end of the catheter. Make sure the connection is tight.  17. Use a syringe to fill the catheter balloon with sterile water. Fill to the amount directed by your health care provider.  18. Remove the gloves and wash your hands with soap and water.  How to care for the skin around the catheter  Follow your health care provider's instructions on caring for your skin.  · Use a clean washcloth and soapy water to clean the skin around your catheter every day. Pat the area dry with a clean paper towel.  · Do not pull on the catheter.  · Do not use ointment or lotion on this area, unless told by your health care provider.  · Check the skin around the catheter every day for signs of infection. Check  for:  ? Redness, swelling, or pain.  ? Fluid or blood.  ? Warmth.  ? Pus or a bad smell.  How to empty and clean the collection bag  Empty the large collection bag every 8 hours. Empty the small collection bag when it is about ? full. Clean the collection bag every 2-3 days, or as often as told by your health care provider. To do this:  1. Wash your hands with soap and water. If soap and water are not available, use hand .  2. Disconnect the bag from the catheter and immediately attach a new bag to the catheter.  3. Hold the used bag over the toilet or another container.  4. Turn the valve (spigot) at the bottom of the bag to empty the urine. Empty the used bag completely.  ? Do not touch the opening of the spigot.  ? Do not let the opening touch the toilet or container.  5. Close the spigot tightly when the bag is empty.  6. Clean the used bag in one of the following methods:  ? According to the 's instructions.  ? As told by your health care provider.  7. Let the bag dry completely. Put it in a clean plastic bag before storing it.  General tips    · Always wash your hands before and after caring for your catheter and collection bag. Use a mild, fragrance-free soap. If soap and water are not available, use hand .  · Clean the outside of the catheter with soap and water as often as told by your health care provider.  · Always make sure there are no twists or kinks in the catheter tube.  · Always make sure there are no leaks in the catheter or collection bag.  · Always wear the leg bag below your knee.  · Make sure the overnight drainage bag is always lower than the level of your bladder, but do not let it touch the floor. Before you go to sleep, hang the bag inside a wastebasket that is covered by a clean plastic bag.  · Drink enough fluid to keep your urine pale yellow.  · Do not take baths, swim, or use a hot tub until your health care provider approves. Ask your health care provider  if you may take showers.  Contact a melissa care provider if:  · You leak urine.  · You have redness, swelling, or pain around your catheter.  · You have fluid or blood coming from your catheter opening.  · Your catheter opening feels warm to the touch.  · You have pus or a bad smell coming from your catheter opening.  · You have a fever or chills.  · Your urine flow slows down.  · Your urine becomes cloudy or smelly.  Get help right away if:  · Your catheter comes out.  · You have:  ? Nausea.  ? Back pain.  ? Difficulty changing your catheter.  ? Blood in your urine.  ? No urine flow for 1 hour.  Summary  · A suprapubic catheter is a flexible tube that is used to drain urine from the bladder into a collection bag outside the body.  · Your suprapubic catheter may need to be changed every 4-6 weeks, or as recommended by your health care provider.  · Follow instructions on how to change the catheter and how to empty and clean the collection bag.  · Always wash your hands before and after caring for your catheter and collection bag. Drink enough fluid to keep your urine pale yellow.  · Get help right away if you have difficulty changing your catheter or if there is blood in your urine.  This information is not intended to replace advice given to you by your health care provider. Make sure you discuss any questions you have with your health care provider.  Document Released: 09/04/2012 Document Revised: 04/09/2020 Document Reviewed: 01/22/2020  [employer] Patient Education © 2020 [employer]    Meropenem injection  What is this medicine?  MEROPENEM (ramya oh PEN em) is a carbapenem antibiotic. It is used to treat certain kinds of bacterial infections. It will not work for colds, flu, or other viral infections.  This medicine may be used for other purposes; ask your health care provider or pharmacist if you have questions.  COMMON BRAND NAME(S): Merrem  What should I tell my health care provider before I take this  medicine?  They need to know if you have any of these conditions:  · brain tumor  · kidney disease  · seizures  · an unusual or allergic reaction to meropenem, other antibiotics or medicines, foods, dyes, or preservatives  · pregnant or trying to get pregnant  · breast-feeding  How should I use this medicine?  This medicine is for infusion into a vein. It is usually given by a health care professional in a hospital or clinic setting.  If you get this medicine at home, you will be taught how to prepare and give this medicine. Use exactly as directed. Take your medicine at regular intervals. Do not take it more often than directed.  It is important that you put your used needles and syringes in a special sharps container. Do not put them in a trash can. If you do not have a sharps container, call your pharmacist or healthcare provider to get one.  Talk to your pediatrician regarding the use of this medicine in children. While this drug may be prescribed for children as young as newborns for selected conditions, precautions do apply.  Overdosage: If you think you have taken too much of this medicine contact a poison control center or emergency room at once.  NOTE: This medicine is only for you. Do not share this medicine with others.  What if I miss a dose?  If you miss a dose, use it as soon as you can. If it is almost time for your next dose, use only that dose. Do not use double or extra doses.  What may interact with this medicine?  · birth control pills  · probenecid  · valproic acid  This list may not describe all possible interactions. Give your health care provider a list of all the medicines, herbs, non-prescription drugs, or dietary supplements you use. Also tell them if you smoke, drink alcohol, or use illegal drugs. Some items may interact with your medicine.  What should I watch for while using this medicine?  Your condition will be monitored carefully while you are receiving this medicine. Tell your  doctor or health care provider if your symptoms do not start to get better or if they get worse.  This medicine may cause serious skin reactions. They can happen weeks to months after starting the medicine. Contact your health care provider right away if you notice fevers or flu-like symptoms with a rash. The rash may be red or purple and then turn into blisters or peeling of the skin. Or, you might notice a red rash with swelling of the face, lips or lymph nodes in your neck or under your arms.  Do not treat diarrhea with over the counter products. Contact your doctor if you have diarrhea that lasts more than 2 days or if it is severe and watery.  Do not drive, use machinery, or do anything that needs mental alertness until you know how this medicine affects you.  What side effects may I notice from receiving this medicine?  Side effects that you should report to your doctor or health care professional as soon as possible:  · allergic reactions like skin rash, itching or hives, swelling of the face, lips, or tongue  · bloody or watery diarrhea  · fever  · fever with rash, swollen lymph nodes, or swelling of the face  · pain, redness, or irritation at site where injected  · pain, tingling, numbness in the hands or feet  · rash, fever, and swollen lymph nodes  · redness, blistering, peeling or loosening of the skin, including inside the mouth  · seizures  · unusual bleeding or bruising  Side effects that usually do not require medical attention (report to your doctor or health care professional if they continue or are bothersome):  · constipation  · diarrhea  · dizziness  · headache  · nausea, vomiting  · stomach pain  · vaginal discharge, itching, or odor in women  This list may not describe all possible side effects. Call your doctor for medical advice about side effects. You may report side effects to FDA at 6-787-KRO-6467.  Where should I keep my medicine?  Keep out of the reach of children.  If you are using  this medicine at home, you will be instructed on how to store this medicine. Throw away any unused medicine after the expiration date on the label.  NOTE: This sheet is a summary. It may not cover all possible information. If you have questions about this medicine, talk to your doctor, pharmacist, or health care provider.  © 2020 [employer]vier/Gold Standard (2020-03-27 08:22:32)

## 2020-12-30 NOTE — PROGRESS NOTES
Assumed care of pt at 0745. Report received and bedside rounding completed with night RN. Pt is calm no SOB, or in any acute distress noted.     Fall precautions in place,  bed alarm. - Treaded non slip socks. Bed locked. Communication board updated with POC. Call light and pt belongings within reach - hourly rounding in place.      1145: Educated pt. About discharge plan, no further questions

## 2020-12-30 NOTE — PROGRESS NOTES
2130  Patient requesting morphine for breakthrough pain. Explained that because of her blood pressure at 2000 was 100/51 (MAP 67) and after experiencing the effect on her blood pressure last week,we do not want to give right now. Took BP again for 99/51.  Patient understands this reason for withholding. Requested to be woken from sleep when oxycodone 5 mg is due.    Patient also requested to speak to charge nurse. She relayed that she overhears nurses outside her door talking about taking out her PICC and discharging her before her course of antibiotic is completed.    Explained the misunderstanding. Patient now understands that we do discharge planning for all patients not talking about discharging her early.

## 2021-01-05 RX ORDER — POTASSIUM CHLORIDE 1.5 G/1.58G
40 POWDER, FOR SOLUTION ORAL 2 TIMES DAILY
Qty: 120 PACKET | Refills: 3 | Status: SHIPPED | OUTPATIENT
Start: 2021-01-05 | End: 2021-02-26

## 2021-01-07 ENCOUNTER — PATIENT OUTREACH (OUTPATIENT)
Dept: HEALTH INFORMATION MANAGEMENT | Facility: OTHER | Age: 31
End: 2021-01-07
Payer: MEDICARE

## 2021-01-07 ENCOUNTER — PATIENT OUTREACH (OUTPATIENT)
Dept: HEALTH INFORMATION MANAGEMENT | Facility: OTHER | Age: 31
End: 2021-01-07

## 2021-01-07 ENCOUNTER — HOSPITAL ENCOUNTER (EMERGENCY)
Facility: MEDICAL CENTER | Age: 31
End: 2021-01-07
Attending: EMERGENCY MEDICINE
Payer: MEDICARE

## 2021-01-07 ENCOUNTER — NURSE TRIAGE (OUTPATIENT)
Dept: HEALTH INFORMATION MANAGEMENT | Facility: OTHER | Age: 31
End: 2021-01-07

## 2021-01-07 ENCOUNTER — OFFICE VISIT (OUTPATIENT)
Dept: CARDIOLOGY | Facility: MEDICAL CENTER | Age: 31
End: 2021-01-07
Payer: MEDICARE

## 2021-01-07 VITALS
WEIGHT: 238 LBS | HEART RATE: 86 BPM | SYSTOLIC BLOOD PRESSURE: 122 MMHG | HEIGHT: 65 IN | DIASTOLIC BLOOD PRESSURE: 76 MMHG | OXYGEN SATURATION: 97 % | BODY MASS INDEX: 39.65 KG/M2

## 2021-01-07 VITALS
WEIGHT: 238 LBS | OXYGEN SATURATION: 96 % | HEIGHT: 65 IN | BODY MASS INDEX: 39.65 KG/M2 | RESPIRATION RATE: 20 BRPM | DIASTOLIC BLOOD PRESSURE: 71 MMHG | HEART RATE: 65 BPM | SYSTOLIC BLOOD PRESSURE: 129 MMHG | TEMPERATURE: 97.5 F

## 2021-01-07 DIAGNOSIS — E87.6 HYPOKALEMIA: ICD-10-CM

## 2021-01-07 DIAGNOSIS — F99 PSYCHIATRIC DISORDER: ICD-10-CM

## 2021-01-07 DIAGNOSIS — I47.11 INAPPROPRIATE SINUS TACHYCARDIA (HCC): ICD-10-CM

## 2021-01-07 DIAGNOSIS — T78.40XA ALLERGIC REACTION, INITIAL ENCOUNTER: ICD-10-CM

## 2021-01-07 PROCEDURE — 99284 EMERGENCY DEPT VISIT MOD MDM: CPT

## 2021-01-07 PROCEDURE — 87086 URINE CULTURE/COLONY COUNT: CPT

## 2021-01-07 PROCEDURE — 87186 SC STD MICRODIL/AGAR DIL: CPT

## 2021-01-07 PROCEDURE — 99214 OFFICE O/P EST MOD 30 MIN: CPT | Performed by: INTERNAL MEDICINE

## 2021-01-07 PROCEDURE — 700102 HCHG RX REV CODE 250 W/ 637 OVERRIDE(OP): Performed by: EMERGENCY MEDICINE

## 2021-01-07 PROCEDURE — 87077 CULTURE AEROBIC IDENTIFY: CPT

## 2021-01-07 PROCEDURE — A9270 NON-COVERED ITEM OR SERVICE: HCPCS | Performed by: EMERGENCY MEDICINE

## 2021-01-07 RX ORDER — DEXAMETHASONE 4 MG/1
4 TABLET ORAL ONCE
Status: COMPLETED | OUTPATIENT
Start: 2021-01-07 | End: 2021-01-07

## 2021-01-07 RX ADMIN — DEXAMETHASONE 4 MG: 4 TABLET ORAL at 13:03

## 2021-01-07 ASSESSMENT — ENCOUNTER SYMPTOMS
DIZZINESS: 0
PALPITATIONS: 0
SHORTNESS OF BREATH: 0
COUGH: 0

## 2021-01-07 ASSESSMENT — FIBROSIS 4 INDEX
FIB4 SCORE: 0.67
FIB4 SCORE: 0.67

## 2021-01-07 ASSESSMENT — LIFESTYLE VARIABLES: DO YOU DRINK ALCOHOL: NO

## 2021-01-07 ASSESSMENT — PATIENT HEALTH QUESTIONNAIRE - PHQ9
CLINICAL INTERPRETATION OF PHQ2 SCORE: 2
SUM OF ALL RESPONSES TO PHQ QUESTIONS 1-9: 9
5. POOR APPETITE OR OVEREATING: 2 - MORE THAN HALF THE DAYS

## 2021-01-07 NOTE — LETTER
"     [facility]   [address]  [place] [address]  Phone: [phone]  Fax: [phone]              Kristin Balderrama  1990    Encounter Date: 1/7/2021    John Leon M.D.          PROGRESS NOTE:  Chief Complaint   Patient presents with   • HTN (Controlled)   • Tachycardia     F/V Dx:SVT (supraventricular tachycardia) (HCC)       Subjective:   Kristin Balderrama is a 30 y.o. female who presents today for sinus tachycardia, ablation 2016 with post ablation PAF on chronic Corlonar therapy.    Last seen on 7/10/2020 by EDD Slade.    Since 7/10/2020 the patient has had no cardiac problems or symptoms.  Cardiac event recorder 8/2020 showed no significant arrhythmias.  Recently hospitalized 12/25/2020-12/30/2020 nausea, vomiting, diarrhea, UTI treated with Bactrim.  Seen yesterday [facility] urgent care for recurrent UTI started on Keflex and Benadryl now with pruritus.  No laryngeal spasm or S OB.  Needs to have InterStim unit for her fecal incontinence removed, wires fractured not functioning and needs cardiac clearance for general anesthesia for Dr. Joe Noyola.  Not taking potassium or magnesium.    Since 10/1/2018 the patient was seen in 1/2019 for elevated heart rate and blood pressure which spontaneously improved.  Seen by Dr. Abhishek Andino, [facility] in the ER and had her increase Ivabradine 7.5 mg twice daily which she tolerates.    Since 2/23/2018 appointment the patient said no cardiac symptoms.  Feels that Corlonar therapy has been quite effective.  On chronic oxygen therapy.    Past Medical History:   Diagnosis Date   • Abdominal pain    • Anginal syndrome     random chest pain especially with tachycardia   • Apnea, sleep    • Arrhythmia     \"sinus tachycardia\", cariologist, Dr. Kumar; ablation 2/2016   • Arthritis     osteo   • ASTHMA     when around smoke   • Atrial fibrillation (HCC)    • Back pain    • Borderline " "personality disorder (HCC)    • Breath shortness     with tachycardia   • Bronchitis    • Cardiac arrhythmia    • Chickenpox    • Chronic UTI 9/18/2010   • Cough    • Daytime sleepiness    • Depression    • Diabetes (HCC)    • Diarrhea    • Disorder of thyroid    • Fall    • Fatigue    • Frequent headaches    • Gasping for breath    • Gynecological disorder     PCOS   • Headache(784.0)    • Heart burn    • History of falling    • Hypertension    • Indigestion    • Migraine    • 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HCC)    • 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HCC)    • Nausea    • Obesity    • Other fatigue 6/29/2020   • Pain 08-15-12    back, 7/10   • Painful joint    • PCOS (polycystic ovarian syndrome)    • Pneumonia 2012   • Psychosis (HCC)    • Renal disorder     \"kidney disease, stage 1\" nephrologist, Dr. 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   • Urinary bladder disorder     Suprapubic cath   • Urinary incontinence    • Weakness    • Wears glasses      Past Surgical History:   Procedure Laterality Date   • MUSCLE BIOPSY Right 1/26/2017    Procedure: MUSCLE BIOPSY - THIGH;  Surgeon: Isidro Vigil M.D.;  Location: SURGERY [facility];  Service:    • GASTROSCOPY WITH BALLOON DILATATION N/A 1/4/2017    Procedure: GASTROSCOPY WITH DILATATION;  Surgeon: Torres Vargas M.D.;  Location: [facility];  Service:    • BOWEL STIMULATOR INSERTION  7/13/2016    Procedure: BOWEL STIMULATOR INSERTION FOR PERMANENT INTERSTIM SACRAL IMPLANT;  Surgeon: Joe Noyola M.D.;  Location: SURGERY [facility];  Service:    • RECOVERY  1/27/2016    Procedure: CATH LAB EP STUDY WITH SINUS NODE MODIFICATION ABHINAV;  Surgeon: [facility];  Location: SURGERY PRE[facility]POST PROC UNIT " [facility];  Service:    • OTHER CARDIAC SURGERY  1/2016    cardiac ablation   • NEURO DEST FACET L/S W/IG SNGL  4/21/2015    Performed by Reza Tabor at SURGERY [facility]   • LUMBAR FUSION ANTERIOR  8/21/2012    Performed by SUSIE SAWANT at SURGERY [facility]   • ALYSSA BY LAPAROSCOPY  8/29/2010    Performed by SHAYY JOHNS at SURGERY [facility]   • LAMINOTOMY     • OTHER ABDOMINAL SURGERY     • TONSILLECTOMY      tonsillectomy     Family History   Problem Relation Age of Onset   • Hypertension Mother    • Sleep Apnea Mother    • Heart Disease Mother    • Other Mother         hypothryod   • Hypertension Maternal Uncle    • Heart Disease Maternal Grandmother    • Hypertension Maternal Grandmother    • No Known Problems Sister    • Other Sister         Narcolepsy;fibromyalsia;bone;nerve   • Genitourinary () Problems Sister         endometriosis     Social History     Socioeconomic History   • Marital status: Single     Spouse name: Not on file   • Number of children: Not on file   • Years of education: Not on file   • Highest education level: Not on file   Occupational History   • Not on file   Social Needs   • Financial resource strain: Not hard at all   • Food insecurity     Worry: Never true     Inability: Never true   • Transportation needs     Medical: No     Non-medical: No   Tobacco Use   • Smoking status: Never Smoker   • Smokeless tobacco: Never Used   Substance and Sexual Activity   • Alcohol use: No     Alcohol/week: 0.0 oz     Frequency: Never     Binge frequency: Never   • Drug use: Not Currently     Frequency: 7.0 times per week     Types: Marijuana   • Sexual activity: Not Currently     Birth control/protection: Pill   Lifestyle   • Physical activity     Days per week: Not on file     Minutes per session: Not on file   • Stress: Not on file   Relationships   • Social connections     Talks on phone: Not on file     Gets together: Not on file     Attends Latter day service: Not on  "file     Active member of club or organization: Not on file     Attends meetings of clubs or organizations: Not on file     Relationship status: Not on file   • Intimate partner violence     Fear of current or ex partner: Not on file     Emotionally abused: Not on file     Physically abused: Not on file     Forced sexual activity: Not on file   Other Topics Concern   • Not on file   Social History Narrative    ** Merged History Encounter **          Allergies   Allergen Reactions   • Depakote [Divalproex Sodium] Unspecified     Muscle spasms/muscle pain and weakness     • Amitriptyline Unspecified     Headaches     • Aripiprazole [Abilify] Unspecified     Headaches/muscle twitching     • Clindamycin Nausea     Even with food     • Flagyl [Metronidazole Hcl] Unspecified     \"eye problems\"     • Flomax [Tamsulosin Hydrochloride] Swelling   • Levaquin Unspecified     Severe muscle cramps in legs  RXN=unknown   • Metformin Unspecified     Increased lactic acid      • Tape Rash     Tears skin off  coban with Tegaderm tape ok intermittently  RXN=ongoing   • Vancomycin Itching     Pt becomes flushed in face and chest.   RXN=7/10/16   • Wound Dressing Adhesive Hives     By pt report   • Ciprofloxacin    • Hydromorphone Hcl      Pt states she feels loopy   • Keflex Rash     Pt states she gets a rash with this medication  Tolerates ceftriaxone   • Levofloxacin Unspecified     Leg muscle cramps   • Metronidazole Rash     .   • Penicillins Hives   • Tamsulosin Swelling   • Valproic Acid Rash     .     Outpatient Encounter Medications as of 1/7/2021   Medication Sig Dispense Refill   • potassium chloride (KLOR-CON) 20 MEQ Pack Take 2 Packets by mouth 2 times a day. 120 Packet 3   • NS SOLN 100 mL with meropenem 500 MG SOLR 1,000 mg Infuse 1,000 mg into a venous catheter every 6 hours.     • Dulaglutide (TRULICITY) 3 MG/0.5ML Solution Pen-injector Inject 3 mg under the skin every 7 days. 2 mL 6   • fluoxetine (PROZAC) 40 MG " capsule Take 1 Cap by mouth every day. 90 Cap 0   • OXcarbazepine (TRILEPTAL) 300 MG Tab Take 1 Tab by mouth 2 times a day. 180 Tab 0   • ziprasidone (GEODON) 40 MG Cap Take 1 tablet by mouth twice a day 180 Cap 0   • busPIRone (BUSPAR) 15 MG tablet Take 1 tablet by mouth 3 times a day 270 Tab 0   • albuterol 108 (90 Base) MCG/ACT Aero Soln inhalation aerosol Inhale 2 Puffs every 6 hours as needed for Shortness of Breath. 8.5 g 6   • levothyroxine (SYNTHROID) 75 MCG Tab Take 1 Tab by mouth Every morning on an empty stomach. 90 Tab 0   • Mirabegron ER (MYRBETRIQ) 50 MG TABLET SR 24 HR Take 50 mg by mouth every day. 90 Tab 3   • acetaminophen (TYLENOL) 500 MG Tab Take 1,000 mg by mouth every 6 hours as needed for Moderate Pain.     • sodium bicarbonate 325 MG Tab Take 650 mg by mouth 2 times a day.     • topiramate (TOPAMAX) 50 MG tablet Take 50 mg by mouth 2 times a day.     • polyethylene glycol 3350 (MIRALAX) 17 GM/SCOOP Powder Take 17 g by mouth 2 times a day for 30 days. 255 g 0   • ondansetron (ZOFRAN ODT) 4 MG TABLET DISPERSIBLE Take 1 Tab by mouth every 6 hours as needed for Nausea. 10 Tab 0   • ipratropium-albuterol (DUONEB) 0.5-2.5 (3) MG/3ML nebulizer solution Take 3 mL by nebulization every four hours as needed for Shortness of Breath. Nebulizer      • mycophenolate (CELLCEPT) 500 MG tablet Take 2 Tabs by mouth 2 times a day. 60 Tab 2   • methocarbamol (ROBAXIN) 750 MG Tab Take 750 mg by mouth every bedtime. Indications: Musculoskeletal Pain     • calcium carbonate (TUMS EX) 750 MG chewable tablet Take 2 Tabs by mouth every day. (Patient taking differently: Chew 1,500 mg every day. Indications: Heartburn, Sour Stomach) 60 Tab 0   • vitamin D, Ergocalciferol, (DRISDOL) 1.25 MG (90289 UT) Cap capsule Take 50,000 Units by mouth every 7 days. On Friday     • insulin glargine (LANTUS) 100 UNIT/ML Solution Inject 8 Units as instructed every evening. 10 mL 0   • traZODone (DESYREL) 100 MG Tab Take 1 Tab by mouth  "every bedtime. (Patient taking differently: Take 100 mg by mouth every evening.) 30 Tab 3   • omeprazole (PRILOSEC) 20 MG delayed-release capsule Take 1 Cap by mouth every day. 30 Cap 0   • oxybutynin (DITROPAN) 5 MG Tab Take 1 Tab by mouth 3 times a day. 90 Tab 0   • acetaZOLAMIDE SR (DIAMOX) 500 MG CAPSULE SR 12 HR Take 500 mg by mouth 2 times a day.     • ivabradine (CORLANOR) 7.5 MG Tab tablet Take 7.5 mg by mouth 2 times a day, with meals.     • gabapentin (NEURONTIN) 300 MG Cap Take 300 mg by mouth every evening.     • aspirin EC (ECOTRIN) 81 MG Tablet Delayed Response Take 81 mg by mouth every day.       No facility-administered encounter medications on file as of 1/7/2021.      Review of Systems   Respiratory: Negative for cough and shortness of breath.    Cardiovascular: Negative for chest pain and palpitations.   Neurological: Negative for dizziness.        Objective:   /76 (BP Location: Left arm, Patient Position: Sitting, BP Cuff Size: Adult)   Pulse 86   Ht 1.651 m (5' 5\")   Wt 108 kg (238 lb)   SpO2 97%   BMI 39.61 kg/m²     Physical Exam   Constitutional: She is oriented to person, place, and time. She appears well-developed and well-nourished.   Nasal cannula.  Wheelchair.   Neck: No JVD present.   Cardiovascular: Normal rate, regular rhythm, normal heart sounds and intact distal pulses.   Pulmonary/Chest: Effort normal and breath sounds normal. No respiratory distress. She has no wheezes. She has no rales.   Abdominal:   Markedly protuberant.   Neurological: She is alert and oriented to person, place, and time.   Skin: Skin is warm and dry.   Psychiatric: She has a normal mood and affect. Her behavior is normal.     03/01/2017 ECHOCARDIOGRAM  Left ventricular ejection fraction 60%.  No valvular disease.    Cardiac event recorder 8/31/2020  ZioPatch Summary:   1. Sinus rhythm with appropriate heart rate range. Average 73, range 52 to 117 bpm.   2. Normal sinus node and AV node conduction " normal. No pauses.   3. Rare PACs.   4. Rare PVCs.   5. No sustained rhythm changes. No atrial fibrillation/flutter.   6. 5 patient triggers, symptoms reported include fluttering and racing, shaky during sinus, 73 to 90 bpm..   Conclusion: Normal monitor.  Sinus rhythm with rare PVCs and PACs.  Symptoms during sinus rhythm.     Lexy Solomon MD [facility]     08/13/2018 EKG: Normal sinus rhythm, rate 83.  Reviewed by myself.    Assessment:     1. Inappropriate sinus tachycardia  Basic Metabolic Panel    MAGNESIUM       Medical Decision Making:  Today's Assessment / Status / Plan:     Assessment  1.  Inappropriate sinus tachycardia.  2.  Ablation for IST, 2016.  3.  Thyroid disorder, on chronic supplementation.  4.  Sleep apnea on CPAP.  5.  Morbid obesity.    Recommendation Discussion  1.  The patient's current cardiac status is stable.  2.  Directed to restart taking potassium 40 mEq daily.  3.  Check magnesium and BMP.  4.  Reviewed cardiac event recorder results.  5.  Continue current cardiac therapy.  6.  Cleared from a cardiac standpoint for anesthesia for removal of InterStim device.  7.  RTC 6 months.        Joe Noyola M.D.  [address] [place] [address] [place] [address]  Via Fax: [phone]

## 2021-01-07 NOTE — PROGRESS NOTES
"Chief Complaint   Patient presents with   • HTN (Controlled)   • Tachycardia     F/V Dx:SVT (supraventricular tachycardia) ([facility])       Subjective:   Kristin Balderrama is a 30 y.o. female who presents today for sinus tachycardia, ablation 2016 with post ablation PAF on chronic Corlonar therapy.    Last seen on 7/10/2020 by EDD Slade.    Since 7/10/2020 the patient has had no cardiac problems or symptoms.  Cardiac event recorder 8/2020 showed no significant arrhythmias.  Recently hospitalized 12/25/2020-12/30/2020 nausea, vomiting, diarrhea, UTI treated with Bactrim.  Seen yesterday NN  urgent care for recurrent UTI started on Keflex and Benadryl now with pruritus.  No laryngeal spasm or S OB.  Needs to have InterStim unit for her fecal incontinence removed, wires fractured not functioning and needs cardiac clearance for general anesthesia for Dr. Joe Noyola.  Not taking potassium or magnesium.    Since 10/1/2018 the patient was seen in 1/2019 for elevated heart rate and blood pressure which spontaneously improved.  Seen by Dr. Abhishek Andino, [facility] in the ER and had her increase Ivabradine 7.5 mg twice daily which she tolerates.    Since 2/23/2018 appointment the patient said no cardiac symptoms.  Feels that Corlonar therapy has been quite effective.  On chronic oxygen therapy.    Past Medical History:   Diagnosis Date   • Abdominal pain    • Anginal syndrome     random chest pain especially with tachycardia   • Apnea, sleep    • Arrhythmia     \"sinus tachycardia\", cariologist, Dr. Kumar; ablation 2/2016   • Arthritis     osteo   • ASTHMA     when around smoke   • Atrial fibrillation (HCC)    • Back pain    • Borderline personality disorder (HCC)    • Breath shortness     with tachycardia   • Bronchitis    • Cardiac arrhythmia    • Chickenpox    • Chronic UTI 9/18/2010   • Cough    • Daytime sleepiness    • Depression    • Diabetes (HCC)    • Diarrhea    • Disorder of thyroid    • Fall    • " "Fatigue    • Frequent headaches    • Gasping for breath    • Gynecological disorder     PCOS   • Headache(784.0)    • Heart burn    • History of falling    • Hypertension    • Indigestion    • Migraine    • 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HCC)    • 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HCC)    • Nausea    • Obesity    • Other fatigue 6/29/2020   • Pain 08-15-12    back, 7/10   • Painful joint    • PCOS (polycystic ovarian syndrome)    • Pneumonia 2012   • Psychosis (HCC)    • Renal disorder     \"kidney disease, stage 1\" nephrologist, Dr. Vallejo   • Ringing in ears    • Scoliosis    • Shortness of breath    • Sinus tachycardia 10/31/2013   • Sleep apnea     CPAP \"pulmonary doctor took me off mid year 2016\"   • Snoring    • Tonsillitis    • 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HCC)    • Tuberculosis     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   • Urinary bladder disorder     Suprapubic cath   • Urinary incontinence    • Weakness    • Wears glasses      Past Surgical History:   Procedure Laterality Date   • MUSCLE BIOPSY Right 1/26/2017    Procedure: MUSCLE BIOPSY - THIGH;  Surgeon: Isidro Vigil M.D.;  Location: [facility];  Service:    • GASTROSCOPY WITH BALLOON DILATATION N/A 1/4/2017    Procedure: GASTROSCOPY WITH DILATATION;  Surgeon: Torres Vargas M.D.;  Location: [facility];  Service:    • BOWEL STIMULATOR INSERTION  7/13/2016    Procedure: BOWEL STIMULATOR INSERTION FOR PERMANENT INTERSTIM SACRAL IMPLANT;  Surgeon: Joe Noyola M.D.;  Location: [facility];  Service:    • RECOVERY  1/27/2016    Procedure: CATH LAB EP STUDY WITH SINUS NODE MODIFICATION ABHINAV;  Surgeon: Recoveryonly Surgery;  Location[place] SURGERY PRE-[place] PROC UN[facility];  Service:    • OTHER CARDIAC SURGERY  1/2016    cardiac ablation   • NEURO DEST FACET L/S W/IG SNGL  4/21/2015    Per[facility] Reza [facility]abor [facility]   • LUMBAR FUSION ANTERIOR  8/21/2012    Performed by SUSIE SAWANT at [facility]   • " ALYSSA BY LAPAROSCOPY  8/29/2010    Performed by SHAYY JOHNS at SURGERY [facility]   • LAMINOTOMY     • OTHER ABDOMINAL SURGERY     • TONSILLECTOMY      tonsillectomy     Family History   Problem Relation Age of Onset   • Hypertension Mother    • Sleep Apnea Mother    • Heart Disease Mother    • Other Mother         hypothryod   • Hypertension Maternal Uncle    • Heart Disease Maternal Grandmother    • Hypertension Maternal Grandmother    • No Known Problems Sister    • Other Sister         Narcolepsy;fibromyalsia;bone;nerve   • Genitourinary () Problems Sister         endometriosis     Social History     Socioeconomic History   • Marital status: Single     Spouse name: Not on file   • Number of children: Not on file   • Years of education: Not on file   • Highest education level: Not on file   Occupational History   • Not on file   Social Needs   • Financial resource strain: Not hard at all   • Food insecurity     Worry: Never true     Inability: Never true   • Transportation needs     Medical: No     Non-medical: No   Tobacco Use   • Smoking status: Never Smoker   • Smokeless tobacco: Never Used   Substance and Sexual Activity   • Alcohol use: No     Alcohol/week: 0.0 oz     Frequency: Never     Binge frequency: Never   • Drug use: Not Currently     Frequency: 7.0 times per week     Types: Marijuana   • Sexual activity: Not Currently     Birth control/protection: Pill   Lifestyle   • Physical activity     Days per week: Not on file     Minutes per session: Not on file   • Stress: Not on file   Relationships   • Social connections     Talks on phone: Not on file     Gets together: Not on file     Attends Protestant service: Not on file     Active member of club or organization: Not on file     Attends meetings of clubs or organizations: Not on file     Relationship status: Not on file   • Intimate partner violence     Fear of current or ex partner: Not on file     Emotionally abused: Not on file      "Physically abused: Not on file     Forced sexual activity: Not on file   Other Topics Concern   • Not on file   Social History Narrative    ** Merged History Encounter **          Allergies   Allergen Reactions   • Depakote [Divalproex Sodium] Unspecified     Muscle spasms/muscle pain and weakness     • Amitriptyline Unspecified     Headaches     • Aripiprazole [Abilify] Unspecified     Headaches/muscle twitching     • Clindamycin Nausea     Even with food     • Flagyl [Metronidazole Hcl] Unspecified     \"eye problems\"     • Flomax [Tamsulosin Hydrochloride] Swelling   • Levaquin Unspecified     Severe muscle cramps in legs  RXN=unknown   • Metformin Unspecified     Increased lactic acid      • Tape Rash     Tears skin off  coban with Tegaderm tape ok intermittently  RXN=ongoing   • Vancomycin Itching     Pt becomes flushed in face and chest.   RXN=7/10/16   • Wound Dressing Adhesive Hives     By pt report   • Ciprofloxacin    • Hydromorphone Hcl      Pt states she feels loopy   • Keflex Rash     Pt states she gets a rash with this medication  Tolerates ceftriaxone   • Levofloxacin Unspecified     Leg muscle cramps   • Metronidazole Rash     .   • Penicillins Hives   • Tamsulosin Swelling   • Valproic Acid Rash     .     Outpatient Encounter Medications as of 1/7/2021   Medication Sig Dispense Refill   • potassium chloride (KLOR-CON) 20 MEQ Pack Take 2 Packets by mouth 2 times a day. 120 Packet 3   • NS SOLN 100 mL with meropenem 500 MG SOLR 1,000 mg Infuse 1,000 mg into a venous catheter every 6 hours.     • Dulaglutide (TRULICITY) 3 MG/0.5ML Solution Pen-injector Inject 3 mg under the skin every 7 days. 2 mL 6   • fluoxetine (PROZAC) 40 MG capsule Take 1 Cap by mouth every day. 90 Cap 0   • OXcarbazepine (TRILEPTAL) 300 MG Tab Take 1 Tab by mouth 2 times a day. 180 Tab 0   • ziprasidone (GEODON) 40 MG Cap Take 1 tablet by mouth twice a day 180 Cap 0   • busPIRone (BUSPAR) 15 MG tablet Take 1 tablet by mouth 3 " times a day 270 Tab 0   • albuterol 108 (90 Base) MCG/ACT Aero Soln inhalation aerosol Inhale 2 Puffs every 6 hours as needed for Shortness of Breath. 8.5 g 6   • levothyroxine (SYNTHROID) 75 MCG Tab Take 1 Tab by mouth Every morning on an empty stomach. 90 Tab 0   • Mirabegron ER (MYRBETRIQ) 50 MG TABLET SR 24 HR Take 50 mg by mouth every day. 90 Tab 3   • acetaminophen (TYLENOL) 500 MG Tab Take 1,000 mg by mouth every 6 hours as needed for Moderate Pain.     • sodium bicarbonate 325 MG Tab Take 650 mg by mouth 2 times a day.     • topiramate (TOPAMAX) 50 MG tablet Take 50 mg by mouth 2 times a day.     • polyethylene glycol 3350 (MIRALAX) 17 GM/SCOOP Powder Take 17 g by mouth 2 times a day for 30 days. 255 g 0   • ondansetron (ZOFRAN ODT) 4 MG TABLET DISPERSIBLE Take 1 Tab by mouth every 6 hours as needed for Nausea. 10 Tab 0   • ipratropium-albuterol (DUONEB) 0.5-2.5 (3) MG/3ML nebulizer solution Take 3 mL by nebulization every four hours as needed for Shortness of Breath. Nebulizer      • mycophenolate (CELLCEPT) 500 MG tablet Take 2 Tabs by mouth 2 times a day. 60 Tab 2   • methocarbamol (ROBAXIN) 750 MG Tab Take 750 mg by mouth every bedtime. Indications: Musculoskeletal Pain     • calcium carbonate (TUMS EX) 750 MG chewable tablet Take 2 Tabs by mouth every day. (Patient taking differently: Chew 1,500 mg every day. Indications: Heartburn, Sour Stomach) 60 Tab 0   • vitamin D, Ergocalciferol, (DRISDOL) 1.25 MG (97466 UT) Cap capsule Take 50,000 Units by mouth every 7 days. On Friday     • insulin glargine (LANTUS) 100 UNIT/ML Solution Inject 8 Units as instructed every evening. 10 mL 0   • traZODone (DESYREL) 100 MG Tab Take 1 Tab by mouth every bedtime. (Patient taking differently: Take 100 mg by mouth every evening.) 30 Tab 3   • omeprazole (PRILOSEC) 20 MG delayed-release capsule Take 1 Cap by mouth every day. 30 Cap 0   • oxybutynin (DITROPAN) 5 MG Tab Take 1 Tab by mouth 3 times a day. 90 Tab 0   •  "acetaZOLAMIDE SR (DIAMOX) 500 MG CAPSULE SR 12 HR Take 500 mg by mouth 2 times a day.     • ivabradine (CORLANOR) 7.5 MG Tab tablet Take 7.5 mg by mouth 2 times a day, with meals.     • gabapentin (NEURONTIN) 300 MG Cap Take 300 mg by mouth every evening.     • aspirin EC (ECOTRIN) 81 MG Tablet Delayed Response Take 81 mg by mouth every day.       No facility-administered encounter medications on file as of 1/7/2021.      Review of Systems   Respiratory: Negative for cough and shortness of breath.    Cardiovascular: Negative for chest pain and palpitations.   Neurological: Negative for dizziness.        Objective:   /76 (BP Location: Left arm, Patient Position: Sitting, BP Cuff Size: Adult)   Pulse 86   Ht 1.651 m (5' 5\")   Wt 108 kg (238 lb)   SpO2 97%   BMI 39.61 kg/m²     Physical Exam   Constitutional: She is oriented to person, place, and time. She appears well-developed and well-nourished.   Nasal cannula.  Wheelchair.   Neck: No JVD present.   Cardiovascular: Normal rate, regular rhythm, normal heart sounds and intact distal pulses.   Pulmonary/Chest: Effort normal and breath sounds normal. No respiratory distress. She has no wheezes. She has no rales.   Abdominal:   Markedly protuberant.   Neurological: She is alert and oriented to person, place, and time.   Skin: Skin is warm and dry.   Psychiatric: She has a normal mood and affect. Her behavior is normal.     03/01/2017 ECHOCARDIOGRAM  Left ventricular ejection fraction 60%.  No valvular disease.    Cardiac event recorder 8/31/2020  Leaf Summary:   1. Sinus rhythm with appropriate heart rate range. Average 73, range 52 to 117 bpm.   2. Normal sinus node and AV node conduction normal. No pauses.   3. Rare PACs.   4. Rare PVCs.   5. No sustained rhythm changes. No atrial fibrillation/flutter.   6. 5 patient triggers, symptoms reported include fluttering and racing, shaky during sinus, 73 to 90 bpm..   Conclusion: Normal monitor.  Sinus rhythm " with rare PVCs and PACs.  Symptoms during sinus rhythm.     Lexy Solomon MD [facility]     08/13/2018 EKG: Normal sinus rhythm, rate 83.  Reviewed by myself.    Assessment:     1. Inappropriate sinus tachycardia  Basic Metabolic Panel    MAGNESIUM       Medical Decision Making:  Today's Assessment / Status / Plan:     Assessment  1.  Inappropriate sinus tachycardia.  2.  Ablation for IST, 2016.  3.  Thyroid disorder, on chronic supplementation.  4.  Sleep apnea on CPAP.  5.  Morbid obesity.    Recommendation Discussion  1.  The patient's current cardiac status is stable.  2.  Directed to restart taking potassium 40 mEq daily.  3.  Check magnesium and BMP.  4.  Reviewed cardiac event recorder results.  5.  Continue current cardiac therapy.  6.  Cleared from a cardiac standpoint for anesthesia for removal of InterStim device.  7.  RTC 6 months.

## 2021-01-07 NOTE — TELEPHONE ENCOUNTER
"01/06 tx in ED for \"bad skin infection\", given new medications, antibiotics--pt with pmh of allergic reactions to antibiotics.     01/07 developed full body itchy rash at appointment with heart doctor--told to go to  by provider. 3rd dose of antibiotic and benadryl this morning. Pt states she has full body itchy rash, and states having difficulty breathing. Has taken benadryl, does not have epi pen available.    EMS called by triager and sent to pts domicile.      Reason for Disposition  • Difficulty breathing or wheezing  • Hoarseness or cough that started soon after 1st dose of drug    Answer Assessment - Initial Assessment Questions  1. APPEARANCE of RASH: \"Describe the rash.\" (e.g., spots, blisters, raised areas, skin peeling, scaly)      Scattered rash  2. SIZE: \"How big are the spots?\" (e.g., tip of pen, eraser, coin; inches, centimeters)      Hives size of small raised [place]  3. LOCATION: \"Where is the rash located?\"      Scattered all over body  4. COLOR: \"What color is the rash?\" (Note: It is difficult to assess rash color in people with darker-colored skin. When this situation occurs, simply ask the caller to describe what they see.)      Pink/red skin  5. ONSET: \"When did the rash begin?\"      30 minutes ago  6. FEVER: \"Do you have a fever?\" If so, ask: \"What is your temperature, how was it measured, and when did it start?\"      DENIES  7. ITCHING: \"Does the rash itch?\" If so, ask: \"How bad is the itch?\" (Scale 1-10; or mild, moderate, severe)      YES, 9/10 itch  8. CAUSE: \"What do you think is causing the rash?\"      New antibiotics  9. NEW MEDICATION: \"What new medication are you taking?\" (e.g., name of antibiotic) \"When did you start taking this medication?\".      Yesterday started taking antibiotics  10. OTHER SYMPTOMS: \"Do you have any other symptoms?\" (e.g., sore throat, fever, joint pain)        Lightheaded, nauseous, breathing impacted.   11. PREGNANCY: \"Is there any chance you are pregnant?\" " "\"When was your last menstrual period?\"    Protocols used: RASH - WIDESPREAD WHILE ON DRUGS-A-OH      "

## 2021-01-07 NOTE — ED TRIAGE NOTES
Pt came in today complaining of allergic reaction after taking keflex. Hives to bilateral arms per pt. Pt took 25 mg benadryl. Hives not present upon EMS arrival on scene.

## 2021-01-07 NOTE — ED PROVIDER NOTES
"ED Provider Note    CHIEF COMPLAINT  Chief Complaint   Patient presents with   • Allergic Reaction       HPI  Kristin Balderrama is a 30 y.o. female who presents for evaluation of possible allergic reaction the patient has a complex medical history.  She has a suprapubic catheter that has had intermittent infections.  She also has some mild cellulitis around the suprapubic catheter site.  She is on Keflex for this.  She started taking Keflex and developed a very faint rash to the torso.  She took some Benadryl at home.  She denies trouble breathing or swallowing.  No other complaints    REVIEW OF SYSTEMS  See HPI for further details.  No high fevers chills night sweats weight loss all other systems are negative.     PAST MEDICAL HISTORY  Past Medical History:   Diagnosis Date   • Other fatigue 6/29/2020   • Sinus tachycardia 10/31/2013   • Pain 08-15-12    back, 7/10   • Pneumonia 2012   • Chronic UTI 9/18/2010   • Abdominal pain    • Anginal syndrome     random chest pain especially with tachycardia   • Apnea, sleep    • Arrhythmia     \"sinus tachycardia\", cariologist, Dr. Kumar; ablation 2/2016   • Arthritis     osteo   • ASTHMA     when around smoke   • Atrial fibrillation (HCC)    • Back pain    • Borderline personality disorder (HCC)    • Breath shortness     with tachycardia   • Bronchitis    • Cardiac arrhythmia    • Chickenpox    • Chronic obstructive pulmonary disease (HCC)    • Cough    • Daytime sleepiness    • Depression    • Diabetes (HCC)    • Diarrhea    • Disorder of thyroid    • Fall    • Fatigue    • Frequent headaches    • Gasping for breath    • Gynecological disorder     PCOS   • Headache(784.0)    • Heart burn    • History of falling    • Indigestion    • Migraine    • 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HCC)    • 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HCC)    • Nausea    • Obesity    • Painful joint    • PCOS (polycystic ovarian syndrome)    • Psychosis (HCC)    • Ringing in ears    • Scoliosis    • Shortness of " "breath    • Sleep apnea     CPAP \"pulmonary doctor took me off mid year 2016\"   • Snoring    • Tonsillitis    • 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HCC)    • Tuberculosis     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   • Urinary bladder disorder     Suprapubic cath   • Urinary incontinence    • Weakness    • Wears glasses        FAMILY HISTORY  Noncontributory    SOCIAL HISTORY  Social History     Socioeconomic History   • Marital status: Single     Spouse name: Not on file   • Number of children: Not on file   • Years of education: Not on file   • Highest education level: Not on file   Occupational History   • Not on file   Social Needs   • Financial resource strain: Not hard at all   • Food insecurity     Worry: Never true     Inability: Never true   • Transportation needs     Medical: No     Non-medical: No   Tobacco Use   • Smoking status: Never Smoker   • Smokeless tobacco: Never Used   Substance and Sexual Activity   • Alcohol use: No     Alcohol/week: 0.0 oz     Frequency: Never     Binge frequency: Never   • Drug use: Not Currently     Frequency: 7.0 times per week     Types: Marijuana   • Sexual activity: Not Currently     Birth control/protection: Pill   Lifestyle   • Physical activity     Days per week: Not on file     Minutes per session: Not on file   • Stress: Not on file   Relationships   • Social connections     Talks on phone: Not on file     Gets together: Not on file     Attends Sabianist service: Not on file     Active member of club or organization: Not on file     Attends meetings of clubs or organizations: Not on file     Relationship status: Not on file   • Intimate partner violence     Fear of current or ex partner: Not on file     Emotionally abused: Not on file     Physically abused: Not on file     Forced sexual activity: Not on file   Other Topics Concern   • Not on file   Social History Narrative    ** Merged History Encounter **            SURGICAL HISTORY  Past Surgical History:   Procedure " "Laterality Date   • MUSCLE BIOPSY Right 1/26/2017    Procedure: MUSCLE BIOPSY - THIGH;  Surgeon: Isidro Vigil M.D.;  Location: SURGERY [facility];  Service:    • GASTROSCOPY WITH BALLOON DILATATION N/A 1/4/2017    Procedure: GASTROSCOPY WITH DILATATION;  Surgeon: Torres Vargas M.D.;  Location: SURGERY [facility];  Service:    • BOWEL STIMULATOR INSERTION  7/13/2016    Procedure: BOWEL STIMULATOR INSERTION FOR PERMANENT INTERSTIM SACRAL IMPLANT;  Surgeon: Joe Noyola M.D.;  Location: SURGERY [facility];  Service:    • RECOVERY  1/27/2016    Procedure: CATH LAB EP STUDY WITH SINUS NODE MODIFICATION LA;  Surgeon: Recoveryonly Surgery;  Location: SURGERY [facility];  Service:    • OTHER CARDIAC SURGERY  1/2016    cardiac ablation   • NEURO DEST FACET L/S W/IG SNGL  4/21/2015    Performed by Reza Tabor at SURGERY [facility]   • LUMBAR FUSION ANTERIOR  8/21/2012    Performed by SUSIE SAWANT at SURGERY [facility]   • ALYSSA BY LAPAROSCOPY  8/29/2010    Performed by SHAYY JOHNS at [facility]   • LAMINOTOMY     • OTHER ABDOMINAL SURGERY     • TONSILLECTOMY      tonsillectomy       CURRENT MEDICATIONS  Home Medications    **Home medications have not yet been reviewed for this encounter**         ALLERGIES  Allergies   Allergen Reactions   • Depakote [Divalproex Sodium] Unspecified     Muscle spasms/muscle pain and weakness     • Amitriptyline Unspecified     Headaches     • Aripiprazole [Abilify] Unspecified     Headaches/muscle twitching     • Clindamycin Nausea     Even with food     • Flagyl [Metronidazole Hcl] Unspecified     \"eye problems\"     • Flomax [Tamsulosin Hydrochloride] Swelling   • Levaquin Unspecified     Severe muscle cramps in legs  RXN=unknown   • Metformin Unspecified     Increased lactic acid      • Tape Rash     Tears skin off  coban with Tegaderm tape ok intermittently  RXN=ongoing   • Vancomycin Itching     Pt becomes flushed in face " "and chest.   RXN=7/10/16   • Wound Dressing Adhesive Hives     By pt report   • Ciprofloxacin    • Keflex Rash     Pt states she gets a rash with this medication  Tolerates ceftriaxone   • Levofloxacin Unspecified     Leg muscle cramps   • Metronidazole Rash     .   • Penicillins Hives   • Sulfa Drugs Myalgia     Muscle pain and weakness   • Tamsulosin Swelling   • Valproic Acid Rash     .       PHYSICAL EXAM  VITAL SIGNS: /76   Pulse 67   Temp 37 °C (98.6 °F) (Oral)   Resp 20   Ht 1.651 m (5' 5\")   Wt 108 kg (238 lb)   SpO2 97%   BMI 39.61 kg/m²       Constitutional: Well developed, Well nourished, No acute distress, Non-toxic appearance.   HENT: Normocephalic, Atraumatic, Bilateral external ears normal, Oropharynx moist, No oral exudates, Nose normal.   Eyes: PERRLA, EOMI, Conjunctiva normal, No discharge.   Neck: Normal range of motion, No tenderness, Supple, No stridor.   Cardiovascular: Normal heart rate, Normal rhythm, No murmurs, No rubs, No gallops.   Thorax & Lungs: Normal breath sounds, No respiratory distress, No wheezing, No chest tenderness.   Abdomen: Suprapubic catheter has minimal erythema, 2 x 2 cm around the stoma  Skin: Warm, Dry, No erythema, No rash.   Extremities: Intact distal pulses, No edema, No tenderness, No cyanosis, No clubbing.   Musculoskeletal: Good range of motion in all major joints. No tenderness to palpation or major deformities noted.   Neurologic: Alert & oriented x 3, Normal motor function, Normal sensory function, No focal deficits noted.   Psychiatric: Affect normal, Judgment normal, Mood normal.       COURSE & MEDICAL DECISION MAKING  Pertinent Labs & Imaging studies reviewed. (See chart for details)  Patient was given oral Decadron and observed for 2 hours.  She has no evidence of anaphylaxis.  Her minimal skin irritation is mild we will treat this with topical antibiotic ointment and hold any antibiotics.  We will send a urine culture    FINAL IMPRESSION  1.  " Allergic reaction         Electronically signed by: Brian Castrejon M.D., 1/7/2021 12:50 PM

## 2021-01-07 NOTE — PROGRESS NOTES
Phone Number Called: [phone] (home)       Call outcome: Spoke to patient regarding message below.    Message: pt. Informed per  '' Please let patient know I have changed her potassium from a tablet to a dissolvable powder due to an interaction of the tablet with oxybutynin (another medication she takes). ''' she verbalized understanding

## 2021-01-07 NOTE — PROGRESS NOTES
Scheduled appointment for intake call with CCM, through chart noted patient admitted to the ED during appointment time.  Will reschedule when patient is available.

## 2021-01-08 ENCOUNTER — NURSE TRIAGE (OUTPATIENT)
Dept: HEALTH INFORMATION MANAGEMENT | Facility: OTHER | Age: 31
End: 2021-01-08

## 2021-01-08 ENCOUNTER — TELEMEDICINE (OUTPATIENT)
Dept: MEDICAL GROUP | Facility: MEDICAL CENTER | Age: 31
End: 2021-01-08
Payer: MEDICARE

## 2021-01-08 ENCOUNTER — PATIENT MESSAGE (OUTPATIENT)
Dept: CARDIOLOGY | Facility: MEDICAL CENTER | Age: 31
End: 2021-01-08

## 2021-01-08 VITALS
TEMPERATURE: 98.2 F | OXYGEN SATURATION: 97 % | WEIGHT: 238 LBS | HEART RATE: 105 BPM | BODY MASS INDEX: 39.65 KG/M2 | HEIGHT: 65 IN

## 2021-01-08 DIAGNOSIS — N39.0 URINARY TRACT INFECTION ASSOCIATED WITH INDWELLING URETHRAL CATHETER, INITIAL ENCOUNTER (HCC): ICD-10-CM

## 2021-01-08 DIAGNOSIS — T83.511A URINARY TRACT INFECTION ASSOCIATED WITH INDWELLING URETHRAL CATHETER, INITIAL ENCOUNTER (HCC): ICD-10-CM

## 2021-01-08 PROCEDURE — 99213 OFFICE O/P EST LOW 20 MIN: CPT | Mod: 95,CR | Performed by: FAMILY MEDICINE

## 2021-01-08 ASSESSMENT — FIBROSIS 4 INDEX: FIB4 SCORE: 0.67

## 2021-01-08 NOTE — PROGRESS NOTES
"Virtual Visit: Established Patient   This visit was conducted via Zoom using secure and encrypted videoconferencing technology. The patient was in a private location in the [facility].    The patient's identity was confirmed and verbal consent was obtained for this virtual visit.    Subjective:   CC:   Chief Complaint   Patient presents with   • Wound Infection     discuss treatment plan     PCP : Dr. Sue Preston who is out of the office today    Kristin Balderrama is a 30 y.o. female presenting for evaluation and management of:    She went to the [facility] stand alone ER on 1/6/2020 and was started on Keflex and muprocin ointment for suprapubic catheter site infection and concern for UTI.  She had a urine sample obtained and sent for urine culture.  [facility] ER called her this morning and told her that she needs an abx for a urine infection based on her results.     Of note, the pt went to the [facility] ER yesterday for an allergic reaction with \"horrible rash and trouble breathing after starting keflex.\"  She was given decadron and she is now using topical polysporin for her catheter site infection.    States she is currently having squeezing sensation of the bladder and pain with urination.  Has chills but no fevers.  Has nausea and occasional vomiting for the last 3 days.  [facility] stated that they would call her back with recommendations for UTI antibiotic treatment today (states they called her 15 minutes ago and she is awaiting the call back).  States the redness of her skin is going down with the polysporin topical ointment    ROS   Denies chest pains or shortness of breath.     Allergies   Allergen Reactions   • Depakote [Divalproex Sodium] Unspecified     Muscle spasms/muscle pain and weakness     • Amitriptyline Unspecified     Headaches     • Aripiprazole [Abilify] Unspecified     Headaches/muscle twitching     • Clindamycin Nausea     Even with food     • Flagyl " "[Metronidazole Hcl] Unspecified     \"eye problems\"     • Flomax [Tamsulosin Hydrochloride] Swelling   • Levaquin Unspecified     Severe muscle cramps in legs  RXN=unknown   • Metformin Unspecified     Increased lactic acid      • Tape Rash     Tears skin off  coban with Tegaderm tape ok intermittently  RXN=ongoing   • Vancomycin Itching     Pt becomes flushed in face and chest.   RXN=7/10/16   • Wound Dressing Adhesive Hives     By pt report   • Ciprofloxacin    • Keflex Rash     Pt states she gets a rash with this medication  Tolerates ceftriaxone   • Levofloxacin Unspecified     Leg muscle cramps   • Metronidazole Rash     .   • Penicillins Hives   • Sulfa Drugs Myalgia     Muscle pain and weakness   • Tamsulosin Swelling   • Valproic Acid Rash     .       Current medicines (including changes today)  Current Outpatient Medications   Medication Sig Dispense Refill   • potassium chloride (KLOR-CON) 20 MEQ Pack Take 2 Packets by mouth 2 times a day. 120 Packet 3   • NS SOLN 100 mL with meropenem 500 MG SOLR 1,000 mg Infuse 1,000 mg into a venous catheter every 6 hours.     • Dulaglutide (TRULICITY) 3 MG/0.5ML Solution Pen-injector Inject 3 mg under the skin every 7 days. 2 mL 6   • fluoxetine (PROZAC) 40 MG capsule Take 1 Cap by mouth every day. 90 Cap 0   • OXcarbazepine (TRILEPTAL) 300 MG Tab Take 1 Tab by mouth 2 times a day. 180 Tab 0   • ziprasidone (GEODON) 40 MG Cap Take 1 tablet by mouth twice a day 180 Cap 0   • busPIRone (BUSPAR) 15 MG tablet Take 1 tablet by mouth 3 times a day 270 Tab 0   • albuterol 108 (90 Base) MCG/ACT Aero Soln inhalation aerosol Inhale 2 Puffs every 6 hours as needed for Shortness of Breath. 8.5 g 6   • levothyroxine (SYNTHROID) 75 MCG Tab Take 1 Tab by mouth Every morning on an empty stomach. 90 Tab 0   • Mirabegron ER (MYRBETRIQ) 50 MG TABLET SR 24 HR Take 50 mg by mouth every day. 90 Tab 3   • acetaminophen (TYLENOL) 500 MG Tab Take 1,000 mg by mouth every 6 hours as needed for " Moderate Pain.     • sodium bicarbonate 325 MG Tab Take 650 mg by mouth 2 times a day.     • topiramate (TOPAMAX) 50 MG tablet Take 50 mg by mouth 2 times a day.     • polyethylene glycol 3350 (MIRALAX) 17 GM/SCOOP Powder Take 17 g by mouth 2 times a day for 30 days. 255 g 0   • ondansetron (ZOFRAN ODT) 4 MG TABLET DISPERSIBLE Take 1 Tab by mouth every 6 hours as needed for Nausea. 10 Tab 0   • ipratropium-albuterol (DUONEB) 0.5-2.5 (3) MG/3ML nebulizer solution Take 3 mL by nebulization every four hours as needed for Shortness of Breath. Nebulizer      • mycophenolate (CELLCEPT) 500 MG tablet Take 2 Tabs by mouth 2 times a day. 60 Tab 2   • methocarbamol (ROBAXIN) 750 MG Tab Take 750 mg by mouth every bedtime. Indications: Musculoskeletal Pain     • calcium carbonate (TUMS EX) 750 MG chewable tablet Take 2 Tabs by mouth every day. (Patient taking differently: Chew 1,500 mg every day. Indications: Heartburn, Sour Stomach) 60 Tab 0   • vitamin D, Ergocalciferol, (DRISDOL) 1.25 MG (01771 UT) Cap capsule Take 50,000 Units by mouth every 7 days. On Friday     • insulin glargine (LANTUS) 100 UNIT/ML Solution Inject 8 Units as instructed every evening. 10 mL 0   • traZODone (DESYREL) 100 MG Tab Take 1 Tab by mouth every bedtime. (Patient taking differently: Take 100 mg by mouth every evening.) 30 Tab 3   • omeprazole (PRILOSEC) 20 MG delayed-release capsule Take 1 Cap by mouth every day. 30 Cap 0   • oxybutynin (DITROPAN) 5 MG Tab Take 1 Tab by mouth 3 times a day. 90 Tab 0   • acetaZOLAMIDE SR (DIAMOX) 500 MG CAPSULE SR 12 HR Take 500 mg by mouth 2 times a day.     • ivabradine (CORLANOR) 7.5 MG Tab tablet Take 7.5 mg by mouth 2 times a day, with meals.     • gabapentin (NEURONTIN) 300 MG Cap Take 300 mg by mouth every evening.     • aspirin EC (ECOTRIN) 81 MG Tablet Delayed Response Take 81 mg by mouth every day.       No current facility-administered medications for this visit.        Patient Active Problem List     Diagnosis Date Noted   • Urinary tract infection associated with indwelling urethral catheter (Former[facility]) 10/11/2020     Priority: High   • Diarrhea of presumed infectious origin 09/01/2020     Priority: High   • Intracranial hypertension 02/27/2020     Priority: High   • Optic neuritis 05/27/2019     Priority: High   • Polypharmacy 06/27/2016     Priority: High   • Bilateral heel pain secondary to calcaneal bone spurs 03/28/2016     Priority: High   • Hypokalemia 09/29/2019     Priority: Medium   • Metabolic acidosis 08/30/2019     Priority: Medium   • Diabetes mellitus type 2 in obese ([facility]) 04/13/2017     Priority: Medium   • Presence of suprapubic catheter ([facility]) 02/16/2017     Priority: Medium   • Immunocompromised ([facility]) 11/06/2019     Priority: Low   • Hypothyroidism 08/04/2017     Priority: Low   • Depression 10/28/2016     Priority: Low   • Schizophrenia ([facility]) 10/27/2016     Priority: Low   • Morbidly obese (Former[facility]) 03/07/2016     Priority: Low   • GERD (gastroesophageal reflux disease) 03/07/2016     Priority: Low   • Peripheral neuropathy and chornic pain syndrome ([facility]) 03/06/2016     Priority: Low   • Fatty liver disease, nonalcoholic 01/19/2015     Priority: Low   • Anxiety 12/16/2014     Priority: Low   • Knee pain, right 02/13/2014     Priority: Low   • Borderline personality disorder in adult (Former[facility]) 09/18/2010     Priority: Low   • Illness 12/17/2020   • Seizure ([facility]) 09/04/2020   • Dry eyes 08/18/2020   • 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[facility]) 08/15/2020   • Idiopathic intracranial hypertension 08/15/2020   • History of supraventricular tachycardia 04/20/2020   • Schizoaffective disorder, depressive type ([facility]) 03/02/2020   • PTSD (post-traumatic stress disorder) 03/02/2020   • Mixed stress and urge urinary incontinence 12/27/2019   • History of optic neuritis 12/17/2019   • Mild intermittent asthma without complication 12/16/2019   • Hypocalcemia 11/30/2019   • SVT (supraventricular tachycardia) ([facility]) 04/10/2019   •  Functional diarrhea 01/05/2018   • Vitamin D deficiency 05/21/2016   • Scoliosis 03/07/2016   • Dissociative identity disorder (HCC) 04/02/2011   • Psychiatric disorder 10/22/2009       Family History   Problem Relation Age of Onset   • Hypertension Mother    • Sleep Apnea Mother    • Heart Disease Mother    • Other Mother         hypothryod   • Hypertension Maternal Uncle    • Heart Disease Maternal Grandmother    • Hypertension Maternal Grandmother    • No Known Problems Sister    • Other Sister         Narcolepsy;fibromyalsia;bone;nerve   • Genitourinary () Problems Sister         endometriosis       She  has a past medical history of Abdominal pain, Anginal syndrome, Apnea, sleep, Arrhythmia, Arthritis, ASTHMA, Atrial fibrillation (HCC), Back pain, Borderline personality disorder (HCC), Breath shortness, Bronchitis, Cardiac arrhythmia, Chickenpox, Chronic obstructive pulmonary disease (HCC), Chronic UTI (9/18/2010), Cough, Daytime sleepiness, Depression, Diabetes (HCC), Diarrhea, Disorder of thyroid, Fall, Fatigue, Frequent headaches, Gasping for breath, Gynecological disorder, Headache(784.0), Heart burn, History of falling, Indigestion, Migraine, 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HCC), 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HCC), Nausea, Obesity, Other fatigue (6/29/2020), Pain (08-15-12), Painful joint, PCOS (polycystic ovarian syndrome), Pneumonia (2012), Psychosis (HCC), Ringing in ears, Scoliosis, Shortness of breath, Sinus tachycardia (10/31/2013), Sleep apnea, Snoring, Tonsillitis, 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HCC), Tuberculosis, Urinary bladder disorder, Urinary incontinence, Weakness, and Wears glasses.  She  has a past surgical history that includes neuro dest facet l/s w/ig sngl (4/21/2015); recovery (1/27/2016); delmar by laparoscopy (8/29/2010); lumbar fusion anterior (8/21/2012); other cardiac surgery (1/2016); tonsillectomy; bowel stimulator insertion (7/13/2016); gastroscopy with balloon dilatation (N/A, 1/4/2017);  "muscle biopsy (Right, 1/26/2017); other abdominal surgery; and laminotomy.       Objective:   Pulse (!) 105 Comment: pt reported  Temp 36.8 °C (98.2 °F) Comment: pt reported  Ht 1.651 m (5' 5\")   Wt 108 kg (238 lb) Comment: pt reported  SpO2 97% Comment: pt reported  BMI 39.61 kg/m²     Physical Exam:  Constitutional: Alert, no distress, poorly-groomed and wearing a hospital gown.  Skin: No rashes in visible areas.  Pt reports improvement in the redness of her abdominal suprapubic catheter site but is unable to get her camera positioned for me to see the lesion  Eye: Round. Conjunctiva clear, lids normal. No icterus.   ENMT: Lips pink without lesions, good dentition, moist mucous membranes. Phonation normal.  Neck: No masses, no thyromegaly. Moves freely without pain.  Respiratory: Unlabored respiratory effort, no cough or audible wheeze  Psych: Alert and oriented x3, normal affect and mood.       Assessment and Plan:   The following treatment plan was discussed:     1. Urinary tract infection associated with indwelling urethral catheter, initial encounter ([facility])  New issue to me.  Pt was seen at [facility] (no records available at this time) and was initially given keflex but had an allergic reaction and is now off oral abx and only using topical polysporin which is treating her catheter site cellulitis well per patient report and no allergy symptoms reported today.  She is having UTI symptoms, though, which I recommend she start abx for treatment.  Recommended she follow-up with [facility] ER team (since she is pending a follow-up call from them) and follow their instructions re: oral vs IV abx given her culture results and numerous allergies since they have her sensitivity results and they have already called her and told her they will call her back today with recommendations.  Recommended she remind them that she was allergic to keflex.  Pt to follow-up with her PCP after starting abx treatment " as recommended by the [facility] ER team who have her sensitivity results.  Er precautions given. Patient expressed understanding and agreement with plan.      Follow-up: Return after she speaks with [facility] ER and follows their recommendations regarding abx tx.    More than 15 minutes was spent with the patient discussing the above and reviewing her records.

## 2021-01-08 NOTE — PROGRESS NOTES
Kristin is a pleasant young women familiar to this department.  Her PCP, Dr. Preston, is concerned that Kristin overuses the ER for her Healthcare needs and referred her to [facility].  Kristin has multiple diagnoses and has to coordinate multiple appointments and has a complex medication regimen.   She has visited the ER at [facility] >30 times in the last year as well as visits to [facility] and [facility].  Kristin has a significant psych hx and has a psychiatrist coordinating her medication.  Kristin states that she just needs to get organized, she has a hard time remembering appointments and coordinating her medications.  One of Kristin's goals is to organize her medications, she stated that she ordered a pill organizer and once it arrives she can sit down and organize her medications.  She also stated it is really hard to organize her appointments, stated she does not know how to use her calendar on her phone very well.  She does have MyChart and all of her [facility] MDs will be found on MyChart as well as her appointments.  The only one she will have to organize is her Urologist who is not apart of [facility].  She stated she will look at MyChart and see how to add the appointments to her calendar.  Kristin lives with her Grandmother and Aunt, she states she is able to get rides to appointments and has no problems with her getting her medications, however states that there are times when she is home alone and that ambulating can be difficult.  She has fallen in the past.  She has a cane and a walker, but states the walker is too big to fit through the doorway.  We discussed some options of not using the walker through the door or making sure her needs are met before she is left alone to reduce the need to get up and move around the house by herself.  To reduce her need for the ER a conference was conducted by another CCM RN and SW to come up with a plan of care.  CCM RN will be contacting Kristin on a regular schedule 2 times a week  "to address any needs she may have or concerns regarding her health and address them at that time.  Her PCP may have to be involved to assist with decreasing her ER use.  Will discuss a plan with her PCP if this should arise.  It was also discussed due to Kristin's significant Psych hx that she would benefit from therapy.  She has a referral from her psychiatrist to have outpatient therapy.  Kristin stated that she does not like to bring up memories from the past and therefore did want to be a part of therapy.  Discussed with Kristin that sometimes \"getting things off your chest\"  does not mean you have to bring up the past.  She is willing to try and stated she does have an appointment in March.  Plan for Kristin is to follow up with her twice a week she is agreeable to this plan at this time.  Follow up on 1/12/2021.  "

## 2021-01-08 NOTE — TELEPHONE ENCOUNTER
"1/6 in the ED at [facility] dx. With UTI, giving Keflex, experienced allergic reaction went to [facility] ED 1/7/2021, told to stop taking antibiotics until culture results come in.      Received call today from [facility] and told to take the same antibiotics that she had an allergic reaction to, unsure what to do.    Scheduled pt a VV today for possible results and medication to treat UTI.      Reason for Disposition  • Painful urination AND EITHER frequency or urgency    Additional Information  • Female taking antibiotic for diagnosed urine infection  • Negative: Shock suspected (e.g., cold/pale/clammy skin, too weak to stand, low BP, rapid pulse)  • Negative: Sounds like a life-threatening emergency to the triager  • Negative: Unable to urinate (or only a few drops) and bladder feels very full  • Negative: Vomiting  • Negative: Patient sounds very sick or weak to the triager  • Negative: Severe pain with urination  • Negative: Fever > 100.5 F (38.1 C)  • Negative: Side (flank) or lower back pain present  • Negative: Possibility of pregnancy  • Negative: Age > 50 years  • Negative: > 2 UTIs in last year  • Negative: Unusual vaginal discharge  • Negative: Taking antibiotic > 24 hours for UTI and fever persists  • Negative: Patient is worried about sexually transmitted disease (STD)  • Negative: Taking antibiotic > 3 days for UTI and painful urination not improved    Answer Assessment - Initial Assessment Questions  1. TEST: \"Was it a urinalysis or a urine culture for UTI?\"      UTI pending second culture  2. URINALYSIS RESULT: \"Was it positive or negative?\"  If positive, document what was positive (e.g., LE, WBC, RBC, Bacteria, Epithelial Cells)      Positive  3. FEVER: \"Do you have a fever?\" If so, ask: \"What is your temperature, how was it measured, and when did it start?\"      No  4. FLANK PAIN: \"Do you have any pain in your side?\"      Yes  5. PREGNANCY: \"Is there any chance you are pregnant?\" \"When was your last menstrual " "period?\"      N/A  6. PHENAZOPYRIDINE (Uristat, Pyridium): \"Have you taken phenazopyridine (turns your urine orange) recently?\"      No    Protocols used: URINATION PAIN - FEMALE-A-[place], URINALYSIS RESULTS FOLLOW-UP CALL-A-[place]      "

## 2021-01-10 NOTE — ED NOTES
"ED Positive Culture Follow-up/Notification Note:    Date: 1/10/21     Patient seen in the ED on 1/7/2021 for evaluation of a possible allergic reaction. Patient is currently on Keflex for cellulitis around her suprapubic catheter site.     1. Allergic reaction, initial encounter       Discharge Medication List as of 1/7/2021  2:07 PM          Allergies: Depakote [divalproex sodium], Amitriptyline, Aripiprazole [abilify], Clindamycin, Flagyl [metronidazole hcl], Flomax [tamsulosin hydrochloride], Levaquin, Metformin, Tape, Vancomycin, Wound dressing adhesive, Ciprofloxacin, Keflex, Levofloxacin, Metronidazole, Penicillins, Sulfa drugs, Tamsulosin, and Valproic acid     Vitals:    01/07/21 1229 01/07/21 1238 01/07/21 1300 01/07/21 1417   BP:  136/76 129/71    Pulse:  67 65    Resp:  20     Temp:  37 °C (98.6 °F)  36.4 °C (97.5 °F)   TempSrc:  Oral  Temporal   SpO2:  97% 96%    Weight: 108 kg (238 lb)      Height: 1.651 m (5' 5\")          Final cultures:   Results     Procedure Component Value Units Date/Time    URINE CULTURE(NEW) [[id number]]  (Abnormal)  (Susceptibility) Collected: 01/07/21 1401    Order Status: Completed Specimen: Urine, Suprapubic Updated: 01/09/21 1649     Significant Indicator POS     Source UR     Site URINE, SUPRAPUBIC     Culture Result -      Enterococcus faecalis  ,000 cfu/mL      Narrative:      Droplet, Contact, and Eye Protection  Indication for culture:->Unexplained new onset of Flank pain  and/or Costovertebral angle tenderness  Droplet, Contact, and Eye Protection    Susceptibility     Enterococcus faecalis (1)     Antibiotic Interpretation Microscan Method Status    Daptomycin Sensitive 4 mcg/mL TYLER Final    Nitrofurantoin Sensitive <=32 mcg/mL TYLER Final    Ampicillin Sensitive <=2 mcg/mL TYLER Final    Penicillin Sensitive 2 mcg/mL TYLER Final    Vancomycin Sensitive 1 mcg/mL TYLER Final    Tetracycline Resistant >8 mcg/mL TYLER Final                         Plan:   Patient did not " complain about any signs or symptoms of an UTI. Patient had no leukocytosis or fever. Organism that grew is likely colonization of her cathether. Will not treat for asymptomatic bacteruria. No further follow up or treatment needed.     Comfort Charles, PharmD

## 2021-01-11 ENCOUNTER — PATIENT OUTREACH (OUTPATIENT)
Dept: HEALTH INFORMATION MANAGEMENT | Facility: OTHER | Age: 31
End: 2021-01-11
Payer: MEDICARE

## 2021-01-11 ENCOUNTER — HOSPITAL ENCOUNTER (EMERGENCY)
Facility: MEDICAL CENTER | Age: 31
End: 2021-01-11
Attending: EMERGENCY MEDICINE | Admitting: EMERGENCY MEDICINE
Payer: MEDICARE

## 2021-01-11 ENCOUNTER — HOSPITAL ENCOUNTER (OUTPATIENT)
Facility: MEDICAL CENTER | Age: 31
End: 2021-01-11
Attending: COLON & RECTAL SURGERY | Admitting: COLON & RECTAL SURGERY
Payer: MEDICARE

## 2021-01-11 VITALS
BODY MASS INDEX: 39.65 KG/M2 | HEIGHT: 65 IN | DIASTOLIC BLOOD PRESSURE: 63 MMHG | RESPIRATION RATE: 20 BRPM | TEMPERATURE: 97.5 F | OXYGEN SATURATION: 98 % | HEART RATE: 74 BPM | WEIGHT: 238 LBS | SYSTOLIC BLOOD PRESSURE: 110 MMHG

## 2021-01-11 DIAGNOSIS — N39.0 URINARY TRACT INFECTION ASSOCIATED WITH INDWELLING URETHRAL CATHETER, SUBSEQUENT ENCOUNTER: ICD-10-CM

## 2021-01-11 DIAGNOSIS — R11.2 NON-INTRACTABLE VOMITING WITH NAUSEA, UNSPECIFIED VOMITING TYPE: ICD-10-CM

## 2021-01-11 DIAGNOSIS — T83.511D URINARY TRACT INFECTION ASSOCIATED WITH INDWELLING URETHRAL CATHETER, SUBSEQUENT ENCOUNTER: ICD-10-CM

## 2021-01-11 DIAGNOSIS — F25.1 SCHIZOAFFECTIVE DISORDER, DEPRESSIVE TYPE (HCC): ICD-10-CM

## 2021-01-11 DIAGNOSIS — R19.7 DIARRHEA, UNSPECIFIED TYPE: ICD-10-CM

## 2021-01-11 LAB
ALBUMIN SERPL BCP-MCNC: 4.2 G/DL (ref 3.2–4.9)
ALBUMIN/GLOB SERPL: 2.1 G/DL
ALP SERPL-CCNC: 78 U/L (ref 30–99)
ALT SERPL-CCNC: 24 U/L (ref 2–50)
ANION GAP SERPL CALC-SCNC: 13 MMOL/L (ref 7–16)
AST SERPL-CCNC: 23 U/L (ref 12–45)
BASOPHILS # BLD AUTO: 0.8 % (ref 0–1.8)
BASOPHILS # BLD: 0.04 K/UL (ref 0–0.12)
BILIRUB SERPL-MCNC: 0.3 MG/DL (ref 0.1–1.5)
BUN SERPL-MCNC: 8 MG/DL (ref 8–22)
CALCIUM SERPL-MCNC: 9.3 MG/DL (ref 8.4–10.2)
CHLORIDE SERPL-SCNC: 112 MMOL/L (ref 96–112)
CO2 SERPL-SCNC: 13 MMOL/L (ref 20–33)
CREAT SERPL-MCNC: 0.54 MG/DL (ref 0.5–1.4)
EOSINOPHIL # BLD AUTO: 0.14 K/UL (ref 0–0.51)
EOSINOPHIL NFR BLD: 2.9 % (ref 0–6.9)
ERYTHROCYTE [DISTWIDTH] IN BLOOD BY AUTOMATED COUNT: 45.3 FL (ref 35.9–50)
GLOBULIN SER CALC-MCNC: 2 G/DL (ref 1.9–3.5)
GLUCOSE SERPL-MCNC: 93 MG/DL (ref 65–99)
HCT VFR BLD AUTO: 39 % (ref 37–47)
HGB BLD-MCNC: 12.9 G/DL (ref 12–16)
IMM GRANULOCYTES # BLD AUTO: 0.03 K/UL (ref 0–0.11)
IMM GRANULOCYTES NFR BLD AUTO: 0.6 % (ref 0–0.9)
LYMPHOCYTES # BLD AUTO: 1.28 K/UL (ref 1–4.8)
LYMPHOCYTES NFR BLD: 26.3 % (ref 22–41)
MCH RBC QN AUTO: 29.3 PG (ref 27–33)
MCHC RBC AUTO-ENTMCNC: 33.1 G/DL (ref 33.6–35)
MCV RBC AUTO: 88.4 FL (ref 81.4–97.8)
MONOCYTES # BLD AUTO: 0.36 K/UL (ref 0–0.85)
MONOCYTES NFR BLD AUTO: 7.4 % (ref 0–13.4)
NEUTROPHILS # BLD AUTO: 3.02 K/UL (ref 2–7.15)
NEUTROPHILS NFR BLD: 62 % (ref 44–72)
NRBC # BLD AUTO: 0 K/UL
NRBC BLD-RTO: 0 /100 WBC
PLATELET # BLD AUTO: 152 K/UL (ref 164–446)
PMV BLD AUTO: 12.4 FL (ref 9–12.9)
POTASSIUM SERPL-SCNC: 4 MMOL/L (ref 3.6–5.5)
PROT SERPL-MCNC: 6.2 G/DL (ref 6–8.2)
RBC # BLD AUTO: 4.41 M/UL (ref 4.2–5.4)
SODIUM SERPL-SCNC: 138 MMOL/L (ref 135–145)
WBC # BLD AUTO: 4.9 K/UL (ref 4.8–10.8)

## 2021-01-11 PROCEDURE — 99284 EMERGENCY DEPT VISIT MOD MDM: CPT

## 2021-01-11 PROCEDURE — 96374 THER/PROPH/DIAG INJ IV PUSH: CPT

## 2021-01-11 PROCEDURE — 700105 HCHG RX REV CODE 258: Performed by: EMERGENCY MEDICINE

## 2021-01-11 PROCEDURE — 700111 HCHG RX REV CODE 636 W/ 250 OVERRIDE (IP): Performed by: EMERGENCY MEDICINE

## 2021-01-11 PROCEDURE — 80053 COMPREHEN METABOLIC PANEL: CPT

## 2021-01-11 PROCEDURE — 85025 COMPLETE CBC W/AUTO DIFF WBC: CPT

## 2021-01-11 PROCEDURE — 36415 COLL VENOUS BLD VENIPUNCTURE: CPT

## 2021-01-11 RX ORDER — SODIUM CHLORIDE 9 MG/ML
1000 INJECTION, SOLUTION INTRAVENOUS ONCE
Status: COMPLETED | OUTPATIENT
Start: 2021-01-11 | End: 2021-01-11

## 2021-01-11 RX ORDER — CEPHALEXIN 500 MG/1
500 CAPSULE ORAL 4 TIMES DAILY
Status: SHIPPED | COMMUNITY
Start: 2021-01-06 | End: 2021-01-19

## 2021-01-11 RX ORDER — BUSPIRONE HYDROCHLORIDE 10 MG/1
10 TABLET ORAL 3 TIMES DAILY
Status: SHIPPED | COMMUNITY
End: 2021-03-11

## 2021-01-11 RX ORDER — ONDANSETRON 2 MG/ML
4 INJECTION INTRAMUSCULAR; INTRAVENOUS ONCE
Status: COMPLETED | OUTPATIENT
Start: 2021-01-11 | End: 2021-01-11

## 2021-01-11 RX ORDER — AMPICILLIN 500 MG/1
500 CAPSULE ORAL 4 TIMES DAILY
Status: SHIPPED | COMMUNITY
Start: 2021-01-07 | End: 2021-01-19

## 2021-01-11 RX ORDER — PHENOL 1.4 %
10 AEROSOL, SPRAY (ML) MUCOUS MEMBRANE
Status: SHIPPED | COMMUNITY
End: 2023-01-27

## 2021-01-11 RX ORDER — LEVOTHYROXINE SODIUM 0.1 MG/1
100 TABLET ORAL
Status: SHIPPED | COMMUNITY
End: 2021-09-24

## 2021-01-11 RX ADMIN — ONDANSETRON 4 MG: 2 INJECTION INTRAMUSCULAR; INTRAVENOUS at 16:51

## 2021-01-11 RX ADMIN — SODIUM CHLORIDE 1000 ML: 9 INJECTION, SOLUTION INTRAVENOUS at 16:45

## 2021-01-11 ASSESSMENT — FIBROSIS 4 INDEX: FIB4 SCORE: 0.67

## 2021-01-11 NOTE — PROGRESS NOTES
Called back to f/u with Kristin, Kristin stated she called her PCP and was told the her PCP was not available and she should go to Urgent Care.  Kristin was SOB on the phone but stated she was going to the bathroom when the phone rang and she rushed to get the phone.  She stated her HR was 114 and Temp was 99F.  After talking with Kristin for a few minutes her breathing steadied.  She was very concerned that she was not able to keep anything down today and felt she was getting dehydrated.  Encouraged her to call her PCP back.  Stated she would go to  if cannot get a hold of her.  Scheduled a f/u call with Kristin on 01/12/21 at 1400.

## 2021-01-11 NOTE — PROGRESS NOTES
"Kristin called [facility] for advice on healthcare, stated \"I just threw up my medication, I am having trouble breathing and I am tachycardic\"  Had Kristin do some slow deep breathing and noticed her breathing slowed, her tachycardia ( per her) explained was from her just vomiting and once some deep breathing exercises were done stated \"it felt better\"  Instructed Kristin to call her PCP also left a message for her PCP to see if any adjustment in medication can be done.  Kristin also has an urologist but says it is very difficult to get a hold of them.  Will follow up with Kristin later today.  "

## 2021-01-11 NOTE — ED TRIAGE NOTES
BIB [facility] dx with UTI 6 days ago, started on Keflex, states she had allergic reaction then started on ampicillin also having allergic reaction, for the last 2 days has N/V/D.  Pt noted to be SOB with speaking.

## 2021-01-12 ENCOUNTER — PATIENT OUTREACH (OUTPATIENT)
Dept: HEALTH INFORMATION MANAGEMENT | Facility: OTHER | Age: 31
End: 2021-01-12
Payer: MEDICARE

## 2021-01-12 DIAGNOSIS — F25.1 SCHIZOAFFECTIVE DISORDER, DEPRESSIVE TYPE (HCC): ICD-10-CM

## 2021-01-12 NOTE — ED NOTES
Med rec updated and complete  Allergies reviewed  Pt had a list of medications at bedside, went over list of medications and returned list back to pt at bedside.  Pt reports that she started KEFLEX 500MG on 1/6/2021, took 1 dose and stopped, then started AMPICILLIN 500MG on 1/7/2021 only took one tablet and stopped.

## 2021-01-12 NOTE — ED PROVIDER NOTES
"ED Provider Note    CHIEF COMPLAINT  Chief Complaint   Patient presents with   • Nausea/Vomiting/Diarrhea     x 2days       HPI  Kristin Balderrama is a 30 y.o. female who presents with vomiting and diarrhea.  Patient has been treated for potential urinary tract infection with multiple different antibiotics this week.  Patient has a history of ESBL.  Her current urine culture from 4 days ago grew out 50,000 colonies of Enterococcus.  Patient denies fever or chills.  She denies abdominal pain.  Patient denies headache chest pain.  No cough or cold symptoms    REVIEW OF SYSTEMS  See HPI  With vomiting and diarrhea for further details.  No fever no chills.  No cough or cold symptoms.  No abdominal pain.  Positive vomiting and diarrhea.  All other systems are negative.    PAST MEDICAL HISTORY  Past Medical History:   Diagnosis Date   • Abdominal pain    • Anginal syndrome     random chest pain especially with tachycardia   • Apnea, sleep    • Arrhythmia     \"sinus tachycardia\", cariologist, Dr. Kumar; ablation 2/2016   • Arthritis     osteo   • ASTHMA     when around smoke   • Atrial fibrillation (HCC)    • Back pain    • Borderline personality disorder (HCC)    • Breath shortness     with tachycardia   • Bronchitis    • Cardiac arrhythmia    • Chickenpox    • Chronic obstructive pulmonary disease (HCC)    • Chronic UTI 9/18/2010   • Cough    • Daytime sleepiness    • Depression    • Diabetes (HCC)    • Diarrhea    • Disorder of thyroid    • Fall    • Fatigue    • Frequent headaches    • Gasping for breath    • Gynecological disorder     PCOS   • Headache(784.0)    • Heart burn    • History of falling    • Indigestion    • Migraine    • 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HCC)    • 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HCC)    • Nausea    • Obesity    • Other fatigue 6/29/2020   • Pain 08-15-12    back, 7/10   • Painful joint    • PCOS (polycystic ovarian syndrome)    • Pneumonia 2012   • Psychosis (HCC)    • Ringing in ears    • " "Scoliosis    • Shortness of breath    • Sinus tachycardia 10/31/2013   • Sleep apnea     CPAP \"pulmonary doctor took me off mid year 2016\"   • Snoring    • Tonsillitis    • 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HCC)    • Tuberculosis     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   • Urinary bladder disorder     Suprapubic cath   • Urinary incontinence    • Weakness    • Wears glasses        FAMILY HISTORY  Family History   Problem Relation Age of Onset   • Hypertension Mother    • Sleep Apnea Mother    • Heart Disease Mother    • Other Mother         hypothryod   • Hypertension Maternal Uncle    • Heart Disease Maternal Grandmother    • Hypertension Maternal Grandmother    • No Known Problems Sister    • Other Sister         Narcolepsy;fibromyalsia;bone;nerve   • Genitourinary () Problems Sister         endometriosis       SOCIAL HISTORY  Social History     Socioeconomic History   • Marital status: Single     Spouse name: Not on file   • Number of children: Not on file   • Years of education: Not on file   • Highest education level: Not on file   Occupational History   • Not on file   Social Needs   • Financial resource strain: Not hard at all   • Food insecurity     Worry: Never true     Inability: Never true   • Transportation needs     Medical: No     Non-medical: No   Tobacco Use   • Smoking status: Never Smoker   • Smokeless tobacco: Never Used   Substance and Sexual Activity   • Alcohol use: No     Alcohol/week: 0.0 oz     Frequency: Never     Binge frequency: Never   • Drug use: Not Currently     Frequency: 7.0 times per week     Types: Marijuana   • Sexual activity: Not Currently     Birth control/protection: Pill   Lifestyle   • Physical activity     Days per week: Not on file     Minutes per session: Not on file   • Stress: Not on file   Relationships   • Social connections     Talks on phone: Not on file     Gets together: Not on file     Attends Confucianism service: Not on file     Active member of club or " organization: Not on file     Attends meetings of clubs or organizations: Not on file     Relationship status: Not on file   • Intimate partner violence     Fear of current or ex partner: Not on file     Emotionally abused: Not on file     Physically abused: Not on file     Forced sexual activity: Not on file   Other Topics Concern   • Not on file   Social History Narrative    ** Merged History Encounter **            SURGICAL HISTORY  Past Surgical History:   Procedure Laterality Date   • MUSCLE BIOPSY Right 1/26/2017    Procedure: MUSCLE BIOPSY - THIGH;  Surgeon: Isidro Vigil M.D.;  Location: SURGERY [facility] [place];  Service:    • GASTROSCOPY WITH BALLOON DILATATION N/A 1/4/2017    Procedure: GASTROSCOPY WITH DILATATION;  Surgeon: Torres Vargas M.D.;  Location: SURGERY [facility];  Service:    • BOWEL STIMULATOR INSERTION  7/13/2016    Procedure: BOWEL STIMULATOR INSERTION FOR PERMANENT INTERSTIM SACRAL IMPLANT;  Surgeon: Joe Noyola M.D.;  Location: SURGERY [facility] [place];  Service:    • RECOVERY  1/27/2016    Procedure: CATH LAB EP STUDY WITH SINUS NODE MODIFICATION LA;  Surgeon: Recoveryonly Surgery;  Location: SURGERY PRE-POST PROC UNIT [facility] [place];  Service:    • OTHER CARDIAC SURGERY  1/2016    cardiac ablation   • NEURO DEST FACET L/S W/IG SNGL  4/21/2015    Performed by Reza Tabor at [facility]   • LUMBAR FUSION ANTERIOR  8/21/2012    Performed by SUSIE SAWANT at [facility]   • ALYSSA BY LAPAROSCOPY  8/29/2010    Performed by SHAYY JOHNS at [facility]   • LAMINOTOMY     • OTHER ABDOMINAL SURGERY     • TONSILLECTOMY      tonsillectomy       CURRENT MEDICATIONS  @ [employer]ila@    ALLERGIES  Allergies   Allergen Reactions   • Depakote [Divalproex Sodium] Unspecified     Muscle spasms/muscle pain and weakness     • Amitriptyline Unspecified     Headaches     • Aripiprazole [Abilify] Unspecified     Headaches/muscle twitching     • Clindamycin Nausea      "Even with food     • Flagyl [Metronidazole Hcl] Unspecified     \"eye problems\"     • Flomax [Tamsulosin Hydrochloride] Swelling   • Levaquin Unspecified     Severe muscle cramps in legs  RXN=unknown   • Metformin Unspecified     Increased lactic acid      • Tape Rash     Tears skin off  coban with Tegaderm tape ok intermittently  RXN=ongoing   • Vancomycin Itching     Pt becomes flushed in face and chest.   RXN=7/10/16   • Wound Dressing Adhesive Hives     By pt report   • Ampicillin Rash     Pt reports that she received a rash    • Ciprofloxacin    • Keflex Rash     Pt states she gets a rash with this medication  Tolerates ceftriaxone   • Levofloxacin Unspecified     Leg muscle cramps   • Metronidazole Rash     .   • Penicillins Hives   • Sulfa Drugs Myalgia     Muscle pain and weakness   • Tamsulosin Swelling   • Valproic Acid Rash     .       PHYSICAL EXAM  VITAL SIGNS: /60   Pulse 79   Temp 36.4 °C (97.5 °F) (Temporal)   Resp 20   Ht 1.651 m (5' 5\")   Wt 108 kg (238 lb)   SpO2 98%   BMI 39.61 kg/m²   Constitutional:  No acute distress, Non-toxic appearance.   HENT: Normocephalic, Atraumatic, Bilateral external ears normal, Oropharynx dry  Eyes: LUIGI, EOMI, Conjunctiva normal, No discharge.   Neck: Normal range of motion, No tenderness, Supple, No stridor. No nuchal rigidity  Lymphatic: No lymphadenopathy noted.   Cardiovascular: Normal heart rate, Normal rhythm, No murmurs, No rubs, No gallops.   Thorax & Lungs: Normal breath sounds, No respiratory distress, No wheezing,   : Suprapubic catheter is in place without much erythema around the ostomy  Abdomen: Obese.  Normal bowel sounds.  Soft.  Benign.  Nontender  Skin: Warm, Dry, No erythema, No rash.   Back: No tenderness, No CVA tenderness.   Extremities: No edema, No tenderness, No cyanosis, No clubbing. Dorsalis pedis pulses 2+ equal bilaterally. Radial pulses 2+ equal bilaterally    RADIOLOGY/PROCEDURES  Results for orders placed or performed " during the hospital encounter of 01/11/21   CBC WITH DIFFERENTIAL   Result Value Ref Range    WBC 4.9 4.8 - 10.8 K/uL    RBC 4.41 4.20 - 5.40 M/uL    Hemoglobin 12.9 12.0 - 16.0 g/dL    Hematocrit 39.0 37.0 - 47.0 %    MCV 88.4 81.4 - 97.8 fL    MCH 29.3 27.0 - 33.0 pg    MCHC 33.1 (L) 33.6 - 35.0 g/dL    RDW 45.3 35.9 - 50.0 fL    Platelet Count 152 (L) 164 - 446 K/uL    MPV 12.4 9.0 - 12.9 fL    Neutrophils-Polys 62.00 44.00 - 72.00 %    Lymphocytes 26.30 22.00 - 41.00 %    Monocytes 7.40 0.00 - 13.40 %    Eosinophils 2.90 0.00 - 6.90 %    Basophils 0.80 0.00 - 1.80 %    Immature Granulocytes 0.60 0.00 - 0.90 %    Nucleated RBC 0.00 /100 WBC    Neutrophils (Absolute) 3.02 2.00 - 7.15 K/uL    Lymphs (Absolute) 1.28 1.00 - 4.80 K/uL    Monos (Absolute) 0.36 0.00 - 0.85 K/uL    Eos (Absolute) 0.14 0.00 - 0.51 K/uL    Baso (Absolute) 0.04 0.00 - 0.12 K/uL    Immature Granulocytes (abs) 0.03 0.00 - 0.11 K/uL    NRBC (Absolute) 0.00 K/uL   COMP METABOLIC PANEL   Result Value Ref Range    Sodium 138 135 - 145 mmol/L    Potassium 4.0 3.6 - 5.5 mmol/L    Chloride 112 96 - 112 mmol/L    Co2 13 (L) 20 - 33 mmol/L    Anion Gap 13.0 7.0 - 16.0    Glucose 93 65 - 99 mg/dL    Bun 8 8 - 22 mg/dL    Creatinine 0.54 0.50 - 1.40 mg/dL    Calcium 9.3 8.4 - 10.2 mg/dL    AST(SGOT) 23 12 - 45 U/L    ALT(SGPT) 24 2 - 50 U/L    Alkaline Phosphatase 78 30 - 99 U/L    Total Bilirubin 0.3 0.1 - 1.5 mg/dL    Albumin 4.2 3.2 - 4.9 g/dL    Total Protein 6.2 6.0 - 8.2 g/dL    Globulin 2.0 1.9 - 3.5 g/dL    A-G Ratio 2.1 g/dL   ESTIMATED GFR   Result Value Ref Range    GFR If African American >60 >60 mL/min/1.73 m 2    GFR If Non African American >60 >60 mL/min/1.73 m 2     *Note: Due to a large number of results and/or encounters for the requested time period, some results have not been displayed. A complete set of results can be found in Results Review.     Urine culture done 4 days ago grew out 50,000 colonies of Enterococcus    COURSE &  MEDICAL DECISION MAKING  Pertinent Labs & Imaging studies reviewed. (See chart for details)  At this point the patient certainly does not appear to be septic.  She is not altered.  I do not think the indwelling catheter with a positive culture requires any sort of treatment.  Patient is feeling better after IV hydration.  She received 500 cc.  She has antiemetics at home.  Her white count is normal.  Her bicarb is low at 13 however her baseline is 15.  Patient was unable to give us a stool sample here.  Certainly if she continues to have diarrhea she would need to be ruled out for C. difficile.  Patient was discharged home in stable condition    FINAL IMPRESSION  1.  Acute vomiting and diarrhea  2.   3.    Please note that this dictation was created using voice recognition software. I have worked with consultants from the vendor as well as technical experts from [facility] to optimize the interface. I have made every reasonable attempt to correct obvious errors, but I expect that there are errors of grammar and possibly content that I did not discover before finalizing the note.    Electronically signed by: Torres Abdul M.D., 1/11/2021 5:32 PM

## 2021-01-12 NOTE — PROGRESS NOTES
Scheduled follow up today, Kristin stated felt much better after going to the ER and getting fluids, has been told to stop abx and has been able to hold down fluids and food and stated feels much better.  Did state that she had a fall while her family was out the of the house, was able to call a neighbor to help her and stated she did not have any injury.  Re-iterated about making sure all needs are met before family leaves house, she did say that they usually are but her family had to leave quickly yesterday.  She also stated that her medication organizer is actually prepackaged pill packets with her daily medications and times stamped on them, it will arrive on 1/24/21.  This should help keep her medications organized and easier to take.  Will need to address when it arrives.  Discussed Kristin's constant use of the ER during this visit and how having so many doctors treating symptoms could affect her with her complicated history.  Each visit she was told to start and stop certain medications.  It was advised that she follow up with her PCP to sort out all her visits and make sure she is on the proper medications for her conditions.  Agreed that she would.  Next follow up is set for 1/19/21 1400.

## 2021-01-14 ENCOUNTER — PATIENT OUTREACH (OUTPATIENT)
Dept: HEALTH INFORMATION MANAGEMENT | Facility: OTHER | Age: 31
End: 2021-01-14
Payer: MEDICARE

## 2021-01-14 DIAGNOSIS — F25.1 SCHIZOAFFECTIVE DISORDER, DEPRESSIVE TYPE (HCC): ICD-10-CM

## 2021-01-14 NOTE — PROGRESS NOTES
"Kristin called in to ask about her condition. \"I just vomited and now I feel like something is stuck in my throat and I didn't take any medication or food\".  Kristin was calmly taking without any distress in her voice, breathing was steady, was not SOB while talking.  Stated she could still swallow, as she took her anti-nausea medication before she called.  Educated Kristin that sometimes the throat can get a little irritated when someone vomits and that irritation can feel like something is stuck.  Kristin is able to take small sips without difficulty and it was suggested to keep hydrated to take small sips every 15 minutes until the nausea subsides.  It was also suggested that popsicles are helpful to soothe the throat with the cold liquid.  Kristin was agreeable to both ideas and stated will try those.  Follow up call still scheduled for 1/19/21 at 1400.  "

## 2021-01-15 ENCOUNTER — HOSPITAL ENCOUNTER (EMERGENCY)
Facility: MEDICAL CENTER | Age: 31
End: 2021-01-16
Attending: EMERGENCY MEDICINE
Payer: MEDICARE

## 2021-01-15 ENCOUNTER — APPOINTMENT (OUTPATIENT)
Dept: RADIOLOGY | Facility: MEDICAL CENTER | Age: 31
End: 2021-01-15
Attending: EMERGENCY MEDICINE
Payer: MEDICARE

## 2021-01-15 DIAGNOSIS — R11.2 NAUSEA, VOMITING AND DIARRHEA: ICD-10-CM

## 2021-01-15 DIAGNOSIS — R19.7 NAUSEA, VOMITING AND DIARRHEA: ICD-10-CM

## 2021-01-15 LAB
ALBUMIN SERPL BCP-MCNC: 4.3 G/DL (ref 3.2–4.9)
ALBUMIN/GLOB SERPL: 2.7 G/DL
ALP SERPL-CCNC: 65 U/L (ref 30–99)
ALT SERPL-CCNC: 23 U/L (ref 2–50)
ANION GAP SERPL CALC-SCNC: 12 MMOL/L (ref 7–16)
APPEARANCE UR: CLEAR
AST SERPL-CCNC: 20 U/L (ref 12–45)
BASOPHILS # BLD AUTO: 0.6 % (ref 0–1.8)
BASOPHILS # BLD: 0.03 K/UL (ref 0–0.12)
BILIRUB SERPL-MCNC: 0.4 MG/DL (ref 0.1–1.5)
BILIRUB UR QL STRIP.AUTO: NEGATIVE
BUN SERPL-MCNC: 6 MG/DL (ref 8–22)
CALCIUM SERPL-MCNC: 9.1 MG/DL (ref 8.5–10.5)
CAOX CRY #/AREA URNS HPF: ABNORMAL /HPF
CHLORIDE SERPL-SCNC: 114 MMOL/L (ref 96–112)
CO2 SERPL-SCNC: 13 MMOL/L (ref 20–33)
COLOR UR: YELLOW
CREAT SERPL-MCNC: 0.62 MG/DL (ref 0.5–1.4)
EOSINOPHIL # BLD AUTO: 0.25 K/UL (ref 0–0.51)
EOSINOPHIL NFR BLD: 5.2 % (ref 0–6.9)
ERYTHROCYTE [DISTWIDTH] IN BLOOD BY AUTOMATED COUNT: 46.3 FL (ref 35.9–50)
GLOBULIN SER CALC-MCNC: 1.6 G/DL (ref 1.9–3.5)
GLUCOSE BLD-MCNC: 86 MG/DL (ref 65–99)
GLUCOSE SERPL-MCNC: 101 MG/DL (ref 65–99)
GLUCOSE UR STRIP.AUTO-MCNC: NEGATIVE MG/DL
HCG SERPL QL: NEGATIVE
HCT VFR BLD AUTO: 40.6 % (ref 37–47)
HGB BLD-MCNC: 13.2 G/DL (ref 12–16)
IMM GRANULOCYTES # BLD AUTO: 0.01 K/UL (ref 0–0.11)
IMM GRANULOCYTES NFR BLD AUTO: 0.2 % (ref 0–0.9)
KETONES UR STRIP.AUTO-MCNC: NEGATIVE MG/DL
LACTATE BLD-SCNC: 1.6 MMOL/L (ref 0.5–2)
LACTATE BLD-SCNC: 2.1 MMOL/L (ref 0.5–2)
LEUKOCYTE ESTERASE UR QL STRIP.AUTO: ABNORMAL
LIPASE SERPL-CCNC: 58 U/L (ref 11–82)
LYMPHOCYTES # BLD AUTO: 1.49 K/UL (ref 1–4.8)
LYMPHOCYTES NFR BLD: 31.2 % (ref 22–41)
MCH RBC QN AUTO: 29.4 PG (ref 27–33)
MCHC RBC AUTO-ENTMCNC: 32.5 G/DL (ref 33.6–35)
MCV RBC AUTO: 90.4 FL (ref 81.4–97.8)
MICRO URNS: ABNORMAL
MONOCYTES # BLD AUTO: 0.45 K/UL (ref 0–0.85)
MONOCYTES NFR BLD AUTO: 9.4 % (ref 0–13.4)
NEUTROPHILS # BLD AUTO: 2.54 K/UL (ref 2–7.15)
NEUTROPHILS NFR BLD: 53.4 % (ref 44–72)
NITRITE UR QL STRIP.AUTO: NEGATIVE
NRBC # BLD AUTO: 0 K/UL
NRBC BLD-RTO: 0 /100 WBC
PH UR STRIP.AUTO: 6 [PH] (ref 5–8)
PLATELET # BLD AUTO: 207 K/UL (ref 164–446)
PMV BLD AUTO: 12 FL (ref 9–12.9)
POTASSIUM SERPL-SCNC: 3.5 MMOL/L (ref 3.6–5.5)
PROCALCITONIN SERPL-MCNC: <0.05 NG/ML
PROT SERPL-MCNC: 5.9 G/DL (ref 6–8.2)
PROT UR QL STRIP: NEGATIVE MG/DL
RBC # BLD AUTO: 4.49 M/UL (ref 4.2–5.4)
RBC # URNS HPF: ABNORMAL /HPF
RBC UR QL AUTO: NEGATIVE
SODIUM SERPL-SCNC: 139 MMOL/L (ref 135–145)
SP GR UR REFRACTOMETRY: >1.045
TSH SERPL DL<=0.005 MIU/L-ACNC: 0.85 UIU/ML (ref 0.38–5.33)
UROBILINOGEN UR STRIP.AUTO-MCNC: 1 MG/DL
WBC # BLD AUTO: 4.8 K/UL (ref 4.8–10.8)
WBC #/AREA URNS HPF: ABNORMAL /HPF

## 2021-01-15 PROCEDURE — 84443 ASSAY THYROID STIM HORMONE: CPT

## 2021-01-15 PROCEDURE — 700101 HCHG RX REV CODE 250: Performed by: EMERGENCY MEDICINE

## 2021-01-15 PROCEDURE — 80053 COMPREHEN METABOLIC PANEL: CPT

## 2021-01-15 PROCEDURE — 83605 ASSAY OF LACTIC ACID: CPT | Mod: 91

## 2021-01-15 PROCEDURE — 700111 HCHG RX REV CODE 636 W/ 250 OVERRIDE (IP): Performed by: EMERGENCY MEDICINE

## 2021-01-15 PROCEDURE — 82962 GLUCOSE BLOOD TEST: CPT

## 2021-01-15 PROCEDURE — 84703 CHORIONIC GONADOTROPIN ASSAY: CPT

## 2021-01-15 PROCEDURE — 99285 EMERGENCY DEPT VISIT HI MDM: CPT

## 2021-01-15 PROCEDURE — 700105 HCHG RX REV CODE 258: Performed by: EMERGENCY MEDICINE

## 2021-01-15 PROCEDURE — 84145 PROCALCITONIN (PCT): CPT

## 2021-01-15 PROCEDURE — 36415 COLL VENOUS BLD VENIPUNCTURE: CPT

## 2021-01-15 PROCEDURE — 81001 URINALYSIS AUTO W/SCOPE: CPT

## 2021-01-15 PROCEDURE — 96374 THER/PROPH/DIAG INJ IV PUSH: CPT

## 2021-01-15 PROCEDURE — 87040 BLOOD CULTURE FOR BACTERIA: CPT | Mod: 91

## 2021-01-15 PROCEDURE — 74177 CT ABD & PELVIS W/CONTRAST: CPT

## 2021-01-15 PROCEDURE — 83690 ASSAY OF LIPASE: CPT

## 2021-01-15 PROCEDURE — 700117 HCHG RX CONTRAST REV CODE 255: Performed by: EMERGENCY MEDICINE

## 2021-01-15 PROCEDURE — 85025 COMPLETE CBC W/AUTO DIFF WBC: CPT

## 2021-01-15 RX ORDER — SODIUM CHLORIDE, SODIUM LACTATE, POTASSIUM CHLORIDE, CALCIUM CHLORIDE 600; 310; 30; 20 MG/100ML; MG/100ML; MG/100ML; MG/100ML
1000 INJECTION, SOLUTION INTRAVENOUS ONCE
Status: COMPLETED | OUTPATIENT
Start: 2021-01-15 | End: 2021-01-15

## 2021-01-15 RX ORDER — HALOPERIDOL 5 MG/ML
2.5 INJECTION INTRAMUSCULAR ONCE
Status: COMPLETED | OUTPATIENT
Start: 2021-01-15 | End: 2021-01-15

## 2021-01-15 RX ORDER — SODIUM CHLORIDE, SODIUM LACTATE, POTASSIUM CHLORIDE, CALCIUM CHLORIDE 600; 310; 30; 20 MG/100ML; MG/100ML; MG/100ML; MG/100ML
1000 INJECTION, SOLUTION INTRAVENOUS ONCE
Status: COMPLETED | OUTPATIENT
Start: 2021-01-15 | End: 2021-01-16

## 2021-01-15 RX ADMIN — IOHEXOL 100 ML: 350 INJECTION, SOLUTION INTRAVENOUS at 21:00

## 2021-01-15 RX ADMIN — SODIUM CHLORIDE, POTASSIUM CHLORIDE, SODIUM LACTATE AND CALCIUM CHLORIDE 1000 ML: 600; 310; 30; 20 INJECTION, SOLUTION INTRAVENOUS at 19:20

## 2021-01-15 RX ADMIN — SODIUM CHLORIDE, POTASSIUM CHLORIDE, SODIUM LACTATE AND CALCIUM CHLORIDE 1000 ML: 600; 310; 30; 20 INJECTION, SOLUTION INTRAVENOUS at 22:16

## 2021-01-15 RX ADMIN — KETAMINE HYDROCHLORIDE 25 MG: 10 INJECTION, SOLUTION INTRAMUSCULAR; INTRAVENOUS at 22:54

## 2021-01-15 RX ADMIN — HALOPERIDOL LACTATE 2.5 MG: 5 INJECTION, SOLUTION INTRAMUSCULAR at 19:19

## 2021-01-15 ASSESSMENT — FIBROSIS 4 INDEX: FIB4 SCORE: 0.96

## 2021-01-15 NOTE — TELEPHONE ENCOUNTER
Spoke with patient via telephone, patient reports she is doing well, off antibiotics. Recently saw GI, scheduled for EGD and colonoscopy.

## 2021-01-16 VITALS
DIASTOLIC BLOOD PRESSURE: 58 MMHG | WEIGHT: 236 LBS | HEIGHT: 65 IN | BODY MASS INDEX: 39.32 KG/M2 | HEART RATE: 77 BPM | RESPIRATION RATE: 20 BRPM | TEMPERATURE: 98.4 F | OXYGEN SATURATION: 98 % | SYSTOLIC BLOOD PRESSURE: 125 MMHG

## 2021-01-16 NOTE — ED NOTES
Discharge instructions given to patient, a verbal understanding of all instructions was stated. IV removed, cathlon intact, site without s/s of infection. Pt preferred to walk out accompanied by grandmother. VSS, all belongings accounted for.

## 2021-01-16 NOTE — ED TRIAGE NOTES
BIB [facility] to rm 21  Chief Complaint   Patient presents with   • N/V     x3 days     Pt said she is unable to to keep any food or fluids down for the last 3 days, has not been able to take any of her medications for 3 days and has only made a very small amount of dark urine. Also c/o of slight abd pain, denies diarrhea. Chart up for ERP.

## 2021-01-16 NOTE — ED PROVIDER NOTES
"ED Provider Note    CHIEF COMPLAINT  Chief Complaint   Patient presents with   • N/V     x3 days        HPI    Primary care provider: Sue Preston M.D.   History obtained from: Patient  History limited by: None     Kristin Balderrama is a 30 y.o. female who presents to the ED via EMS complaining of nonstop nausea, vomiting and diarrhea for 3 days of \"just clear water.\"  Patient states that she was previously on Keflex, ampicillin and Macrobid for UTI but was seen in this ED on January 11 and \"the doctor told me to stop everything.\"  Patient reports a fever up to 99.  She has felt cold at times.  She reports that her throat is hurting from vomiting so much.  She has some cough when she has vomiting.  She denies congestion/runny nose/change in taste or smell.  She reports that she has shortness of breath at times.  She states that her urine appears to be darker in color and she has some drainage around her suprapubic catheter site.  She denies possibility of pregnancy.  She has some rash while she was on antibiotic but has since resolved.  She reports that she is having leg cramps and believes that she is likely dehydrated.  She denies recent travels or known ill contacts.  No suspicious oral intake.  She reports that her blood pressure was low at home.  Patient also states that because of her persistent vomiting, she has been unable to keep any of her medicine down for the past 2 days.    REVIEW OF SYSTEMS  Please see HPI for pertinent positives/negatives.  All other systems reviewed and are negative.     PAST MEDICAL HISTORY  Past Medical History:   Diagnosis Date   • Other fatigue 6/29/2020   • Sinus tachycardia 10/31/2013   • Pain 08-15-12    back, 7/10   • Pneumonia 2012   • Chronic UTI 9/18/2010   • Abdominal pain    • Anginal syndrome     random chest pain especially with tachycardia   • Apnea, sleep    • Arrhythmia     \"sinus tachycardia\", cariologist, Dr. Kumar; ablation 2/2016   • Arthritis     osteo " "  • ASTHMA     when around smoke   • Atrial fibrillation (HCC)    • Back pain    • Borderline personality disorder (HCC)    • Breath shortness     with tachycardia   • Bronchitis    • Cardiac arrhythmia    • Chickenpox    • Chronic obstructive pulmonary disease (HCC)    • Cough    • Daytime sleepiness    • Depression    • Diabetes (HCC)    • Diarrhea    • Disorder of thyroid    • Fall    • Fatigue    • Frequent headaches    • Gasping for breath    • Gynecological disorder     PCOS   • Headache(784.0)    • Heart burn    • History of falling    • Indigestion    • Migraine    • 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HCC)    • 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HCC)    • Nausea    • Obesity    • Painful joint    • PCOS (polycystic ovarian syndrome)    • Psychosis (HCC)    • Ringing in ears    • Scoliosis    • Shortness of breath    • Sleep apnea     CPAP \"pulmonary doctor took me off mid year 2016\"   • Snoring    • Tonsillitis    • 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HCC)    • Tuberculosis     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   • Urinary bladder disorder     Suprapubic cath   • Urinary incontinence    • Weakness    • Wears glasses         SURGICAL HISTORY  Past Surgical History:   Procedure Laterality Date   • MUSCLE BIOPSY Right 1/26/2017    Procedure: MUSCLE BIOPSY - THIGH;  Surgeon: Isidro Vigil M.D.;  Location: [facility];  Service:    • GASTROSCOPY WITH BALLOON DILATATION N/A 1/4/2017    Procedure: GASTROSCOPY WITH DILATATION;  Surgeon: Torres Vargas M.D.;  Location: [facility];  Service:    • BOWEL STIMULATOR INSERTION  7/13/2016    Procedure: BOWEL STIMULATOR INSERTION FOR PERMANENT INTERSTIM SACRAL IMPLANT;  Surgeon: Joe Noyola M.D.;  Location: [facility];  Service:    • RECOVERY  1/27/2016    Procedure: CATH LAB EP STUDY WITH SINUS NODE MODIFICATION ABHINAV;  Surgeon: Recoveryonly Surgery;  Location: SURGERY PRE-POST PROC UNIT [facility];  Service:    • OTHER CARDIAC SURGERY  1/2016    cardiac " "ablation   • NEURO DEST FACET L/S W/IG SNGL  4/21/2015    Performed by Reza Tabor at SUR[facility]ERY [facility]   • LUMBAR FUSION ANTERIOR  8/21/2012    Performed by SUSIE SAWANT at SURGERY [facility] ORS   • ALYSSA BY LAPAROSCOPY  8/29/2010    Performed by SHAYY JOHNS at SURGERY [facility] ORS   • LAMINOTOMY     • OTHER ABDOMINAL SURGERY     • TONSILLECTOMY      tonsillectomy        SOCIAL HISTORY  Social History     Tobacco Use   • Smoking status: Never Smoker   • Smokeless tobacco: Never Used   Substance and Sexual Activity   • Alcohol use: No     Alcohol/week: 0.0 oz     Frequency: Never     Binge frequency: Never   • Drug use: Not Currently     Frequency: 7.0 times per week     Types: Marijuana   • Sexual activity: Not Currently     Birth control/protection: Pill        FAMILY HISTORY  Family History   Problem Relation Age of Onset   • Hypertension Mother    • Sleep Apnea Mother    • Heart Disease Mother    • Other Mother         hypothryod   • Hypertension Maternal Uncle    • Heart Disease Maternal Grandmother    • Hypertension Maternal Grandmother    • No Known Problems Sister    • Other Sister         Narcolepsy;fibromyalsia;bone;nerve   • Genitourinary () Problems Sister         endometriosis        CURRENT MEDICATIONS  Home Medications    **Home medications have not yet been reviewed for this encounter**          ALLERGIES  Allergies   Allergen Reactions   • Depakote [Divalproex Sodium] Unspecified     Muscle spasms/muscle pain and weakness     • Amitriptyline Unspecified     Headaches     • Aripiprazole [Abilify] Unspecified     Headaches/muscle twitching     • Clindamycin Nausea     Even with food     • Flagyl [Metronidazole Hcl] Unspecified     \"eye problems\"     • Flomax [Tamsulosin Hydrochloride] Swelling   • Levaquin Unspecified     Severe muscle cramps in legs  RXN=unknown   • Metformin Unspecified     Increased lactic acid      • Tape Rash     Tears skin off  coban with Tegaderm tape " "ok intermittently  RXN=ongoing   • Vancomycin Itching     Pt becomes flushed in face and chest.   RXN=7/10/16   • Wound Dressing Adhesive Hives     By pt report   • Ampicillin Rash     Pt reports that she received a rash    • Ciprofloxacin    • Keflex Rash     Pt states she gets a rash with this medication  Tolerates ceftriaxone   • Levofloxacin Unspecified     Leg muscle cramps   • Metronidazole Rash     .   • Penicillins Hives   • Sulfa Drugs Myalgia     Muscle pain and weakness   • Tamsulosin Swelling   • Valproic Acid Rash     .        PHYSICAL EXAM  VITAL SIGNS: /58   Pulse 77   Temp 36.9 °C (98.4 °F) (Oral)   Resp 20   Ht 1.651 m (5' 5\")   Wt 107 kg (236 lb)   SpO2 98%   BMI 39.27 kg/m²  @JODI[[id number]::@     Pulse ox interpretation: 95% I interpret this pulse ox as normal     Constitutional: Well developed, well nourished, alert in no apparent distress, nontoxic appearance    HENT: No external signs of trauma, normocephalic, mask on due to COVID-19 pandemic  Eyes: PERRL, conjunctiva without erythema, no discharge, no icterus    Neck: Soft and supple, trachea midline, no stridor, no tenderness, no LAD, no JVD, good ROM    Cardiovascular: Regular rate and rhythm, no murmurs/rubs/gallops, strong distal pulses and good perfusion    Thorax & Lungs: No respiratory distress, CTAB   Abdomen: Soft, suprapubic catheter in place with trace white drainage at the catheter site, dark yellow urine in the bag without gross blood/clots noted, no apparent abdominal tenderness to palpation, nondistended, no guarding, no rebound, normal BS    Back: No CVAT    Extremities: No cyanosis, mild bilateral lower extremity edema, bilateral feet contracted, intact distal pulses with brisk cap refill    Skin: Warm, dry, no pallor/cyanosis, no rash noted    Lymphatic: No lymphadenopathy noted    Neuro: A/O times 3, chronic lower extremity weakness due to history of transverse myelitis  Psychiatric: Cooperative, slightly anxious, " normal judgement      DIAGNOSTIC STUDIES / PROCEDURES        LABS  All labs reviewed by me.     Results for orders placed or performed during the hospital encounter of 01/15/21   CBC WITH DIFFERENTIAL   Result Value Ref Range    WBC 4.8 4.8 - 10.8 K/uL    RBC 4.49 4.20 - 5.40 M/uL    Hemoglobin 13.2 12.0 - 16.0 g/dL    Hematocrit 40.6 37.0 - 47.0 %    MCV 90.4 81.4 - 97.8 fL    MCH 29.4 27.0 - 33.0 pg    MCHC 32.5 (L) 33.6 - 35.0 g/dL    RDW 46.3 35.9 - 50.0 fL    Platelet Count 207 164 - 446 K/uL    MPV 12.0 9.0 - 12.9 fL    Neutrophils-Polys 53.40 44.00 - 72.00 %    Lymphocytes 31.20 22.00 - 41.00 %    Monocytes 9.40 0.00 - 13.40 %    Eosinophils 5.20 0.00 - 6.90 %    Basophils 0.60 0.00 - 1.80 %    Immature Granulocytes 0.20 0.00 - 0.90 %    Nucleated RBC 0.00 /100 WBC    Neutrophils (Absolute) 2.54 2.00 - 7.15 K/uL    Lymphs (Absolute) 1.49 1.00 - 4.80 K/uL    Monos (Absolute) 0.45 0.00 - 0.85 K/uL    Eos (Absolute) 0.25 0.00 - 0.51 K/uL    Baso (Absolute) 0.03 0.00 - 0.12 K/uL    Immature Granulocytes (abs) 0.01 0.00 - 0.11 K/uL    NRBC (Absolute) 0.00 K/uL   COMP METABOLIC PANEL   Result Value Ref Range    Sodium 139 135 - 145 mmol/L    Potassium 3.5 (L) 3.6 - 5.5 mmol/L    Chloride 114 (H) 96 - 112 mmol/L    Co2 13 (L) 20 - 33 mmol/L    Anion Gap 12.0 7.0 - 16.0    Glucose 101 (H) 65 - 99 mg/dL    Bun 6 (L) 8 - 22 mg/dL    Creatinine 0.62 0.50 - 1.40 mg/dL    Calcium 9.1 8.5 - 10.5 mg/dL    AST(SGOT) 20 12 - 45 U/L    ALT(SGPT) 23 2 - 50 U/L    Alkaline Phosphatase 65 30 - 99 U/L    Total Bilirubin 0.4 0.1 - 1.5 mg/dL    Albumin 4.3 3.2 - 4.9 g/dL    Total Protein 5.9 (L) 6.0 - 8.2 g/dL    Globulin 1.6 (L) 1.9 - 3.5 g/dL    A-G Ratio 2.7 g/dL   LIPASE   Result Value Ref Range    Lipase 58 11 - 82 U/L   URINALYSIS (UA)    Specimen: Urine, Clean Catch   Result Value Ref Range    Color Yellow     Character Clear     Ph 6.0 5.0 - 8.0    Glucose Negative Negative mg/dL    Ketones Negative Negative mg/dL    Protein  Negative Negative mg/dL    Bilirubin Negative Negative    Urobilinogen, Urine 1.0 Negative    Nitrite Negative Negative    Leukocyte Esterase Small (A) Negative    Occult Blood Negative Negative    Micro Urine Req Microscopic    BETA-HCG QUALITATIVE SERUM   Result Value Ref Range    Beta-Hcg Qualitative Serum Negative Negative   LACTIC ACID   Result Value Ref Range    Lactic Acid 2.1 (H) 0.5 - 2.0 mmol/L   TSH   Result Value Ref Range    TSH 0.849 0.380 - 5.330 uIU/mL   PROCALCITONIN   Result Value Ref Range    Procalcitonin <0.05 <0.25 ng/mL   ESTIMATED GFR   Result Value Ref Range    GFR If African American >60 >60 mL/min/1.73 m 2    GFR If Non African American >60 >60 mL/min/1.73 m 2   LACTIC ACID   Result Value Ref Range    Lactic Acid 1.6 0.5 - 2.0 mmol/L   REFRACTOMETER SG   Result Value Ref Range    Specific Gravity >1.045    URINE MICROSCOPIC (W/UA)   Result Value Ref Range    WBC 10-20 (A) /hpf    RBC 0-2 /hpf    Ca Oxalate Crystal Few /hpf   ACCU-CHEK GLUCOSE   Result Value Ref Range    Glucose - Accu-Ck 86 65 - 99 mg/dL     *Note: Due to a large number of results and/or encounters for the requested time period, some results have not been displayed. A complete set of results can be found in Results Review.        RADIOLOGY  The radiologist's interpretation of all radiological studies have been reviewed by me.     CT-ABDOMEN-PELVIS WITH   Final Result         1.  No acute abnormality.   2.  Right ovarian cyst, stable since prior, consider follow-up pelvic sonogram for further characterization as clinically appropriate.             COURSE & MEDICAL DECISION MAKING  Nursing notes, VS, PMSFHx reviewed in chart.     Review of past medical records shows the patient was last seen at [facility] ED January 11, 2021 for nausea, vomiting and diarrhea with fairly unremarkable labs and was told to stop taking her antibiotics.      Differential diagnoses considered include but are not limited to: Appendicitis, bowel  perforation/obstruction, ileus, diverticulitis, pancreatitis, PUD, gastritis, GERD, KS/renal colic, UTI/pyelo, gastroenteritis, colitis, muscle strain, hernia, pregnancy/ectopic, endometriosis, ovarian cyst/torsion, porphyria       History and physical exam as above.  Labs are fairly unremarkable except for mild hypokalemia and a chronically low bicarb level compared to prior results.  UA without overt signs of infection.  Lactic acid improved after IV hydration.  She received Haldol for her nausea and pain without significant improvement and therefore CT abdomen/pelvis was performed with findings as above.  She subsequently was given low-dose ketamine with improvement of her symptoms.  She was able to tolerate oral fluids without further vomiting.  She had no episodes of bowel movement in the ED and therefore was unable to give a stool sample.  This is unlikely to be C. difficile or significant infectious diarrhea.  Findings discussed with patient and she appears to be much more comfortable and is noted to be in no acute distress and nontoxic in appearance.  No signs of acute abdomen on recheck to suggest a surgical emergency.  She has multiple nausea medicine at home to use as needed.  I discussed with her worrisome signs and symptoms and return to ED precautions and outpatient follow-up regarding her CT findings.  Patient verbalized understanding and agreed with plan of care with no further questions or concerns.      HYDRATION: Based on the patient's presentation of Acute Diarrhea, Acute Vomiting, Dehydration and Tachycardia the patient was given IV fluids. IV Hydration was used because oral hydration was not as rapid as required. Upon recheck following hydration, the patient was improved.      The patient is referred to a primary physician for blood pressure management, diabetic screening, and for all other preventative health concerns.       FINAL IMPRESSION  1. Nausea, vomiting and diarrhea Acute           DISPOSITION  Patient will be discharged home in stable condition.       FOLLOW UP  Sue Preston M.D.  [address]  [address]  [facility] [id number] [phone]    Call in 3 days      [facility], Emergency Dept  [address] [facility] [address] [phone]    If symptoms worsen         OUTPATIENT MEDICATIONS  Discharge Medication List as of 1/16/2021  1:27 AM             Electronically signed by: Yair Diallo D.O., 1/15/2021 6:15 PM      Portions of this record were made with voice recognition software.  Despite my review, spelling/grammar/context errors may still remain.  Interpretation of this chart should be taken in this context.

## 2021-01-16 NOTE — ED NOTES
Pt states she is feeling ++ better, no pain. Suprapubic catheter leaking into depends - given new depends and catether flushed with NS. Draining straw coloured urine. UA sent from sterile collection bag.   English

## 2021-01-16 NOTE — ED NOTES
Pt states nausea has improved, complaining of itching around catheter site and lower abdominal area. No rash noted - mild redness.

## 2021-01-16 NOTE — ED NOTES
Pt refusing to take more oral fluids, states it makes her feel nauseous. Complaining of pain to Lt hand IV - Iv still flushing well but pt not tolerating same. RN attempting new IV access with ultrasound.

## 2021-01-16 NOTE — ED NOTES
Report received from SONYA Esteban. Lab currently at bedside completing lab draw for blood cultures and lactic.

## 2021-01-19 ENCOUNTER — APPOINTMENT (OUTPATIENT)
Dept: RADIOLOGY | Facility: MEDICAL CENTER | Age: 31
DRG: 098 | End: 2021-01-19
Attending: EMERGENCY MEDICINE
Payer: MEDICARE

## 2021-01-19 ENCOUNTER — APPOINTMENT (OUTPATIENT)
Dept: RADIOLOGY | Facility: MEDICAL CENTER | Age: 31
DRG: 098 | End: 2021-01-19
Attending: PSYCHIATRY & NEUROLOGY
Payer: MEDICARE

## 2021-01-19 ENCOUNTER — HOSPITAL ENCOUNTER (INPATIENT)
Facility: MEDICAL CENTER | Age: 31
LOS: 2 days | DRG: 098 | End: 2021-01-22
Attending: EMERGENCY MEDICINE | Admitting: INTERNAL MEDICINE
Payer: MEDICARE

## 2021-01-19 DIAGNOSIS — M41.9 SCOLIOSIS, UNSPECIFIED SCOLIOSIS TYPE, UNSPECIFIED SPINAL REGION: ICD-10-CM

## 2021-01-19 DIAGNOSIS — R29.898 WEAKNESS OF BOTH LOWER EXTREMITIES: ICD-10-CM

## 2021-01-19 DIAGNOSIS — G63 POLYNEUROPATHY ASSOCIATED WITH UNDERLYING DISEASE (HCC): Chronic | ICD-10-CM

## 2021-01-19 DIAGNOSIS — G89.29 CHRONIC PAIN OF RIGHT KNEE: ICD-10-CM

## 2021-01-19 DIAGNOSIS — M25.561 CHRONIC PAIN OF RIGHT KNEE: ICD-10-CM

## 2021-01-19 LAB
ALBUMIN SERPL BCP-MCNC: 4 G/DL (ref 3.2–4.9)
ALBUMIN/GLOB SERPL: 2.5 G/DL
ALP SERPL-CCNC: 67 U/L (ref 30–99)
ALT SERPL-CCNC: 20 U/L (ref 2–50)
AMPHET UR QL SCN: NEGATIVE
ANION GAP SERPL CALC-SCNC: 10 MMOL/L (ref 7–16)
APPEARANCE UR: CLEAR
AST SERPL-CCNC: 13 U/L (ref 12–45)
BARBITURATES UR QL SCN: NEGATIVE
BASOPHILS # BLD AUTO: 0.9 % (ref 0–1.8)
BASOPHILS # BLD: 0.04 K/UL (ref 0–0.12)
BENZODIAZ UR QL SCN: NEGATIVE
BILIRUB SERPL-MCNC: 0.4 MG/DL (ref 0.1–1.5)
BILIRUB UR QL STRIP.AUTO: NEGATIVE
BUN SERPL-MCNC: 4 MG/DL (ref 8–22)
BZE UR QL SCN: NEGATIVE
CALCIUM SERPL-MCNC: 8.8 MG/DL (ref 8.5–10.5)
CANNABINOIDS UR QL SCN: NEGATIVE
CHLORIDE SERPL-SCNC: 115 MMOL/L (ref 96–112)
CO2 SERPL-SCNC: 15 MMOL/L (ref 20–33)
COLOR UR: YELLOW
CREAT SERPL-MCNC: 0.62 MG/DL (ref 0.5–1.4)
CRP SERPL HS-MCNC: 0.14 MG/DL (ref 0–0.75)
EOSINOPHIL # BLD AUTO: 0.09 K/UL (ref 0–0.51)
EOSINOPHIL NFR BLD: 2.1 % (ref 0–6.9)
ERYTHROCYTE [DISTWIDTH] IN BLOOD BY AUTOMATED COUNT: 46.9 FL (ref 35.9–50)
FLUAV RNA SPEC QL NAA+PROBE: NEGATIVE
FLUBV RNA SPEC QL NAA+PROBE: NEGATIVE
FOLATE SERPL-MCNC: 10.8 NG/ML
GLOBULIN SER CALC-MCNC: 1.6 G/DL (ref 1.9–3.5)
GLUCOSE BLD-MCNC: 96 MG/DL (ref 65–99)
GLUCOSE SERPL-MCNC: 83 MG/DL (ref 65–99)
GLUCOSE UR STRIP.AUTO-MCNC: NEGATIVE MG/DL
HCT VFR BLD AUTO: 40 % (ref 37–47)
HGB BLD-MCNC: 12.5 G/DL (ref 12–16)
IMM GRANULOCYTES # BLD AUTO: 0.01 K/UL (ref 0–0.11)
IMM GRANULOCYTES NFR BLD AUTO: 0.2 % (ref 0–0.9)
KETONES UR STRIP.AUTO-MCNC: NEGATIVE MG/DL
LEUKOCYTE ESTERASE UR QL STRIP.AUTO: NEGATIVE
LYMPHOCYTES # BLD AUTO: 1.23 K/UL (ref 1–4.8)
LYMPHOCYTES NFR BLD: 29.1 % (ref 22–41)
MAGNESIUM SERPL-MCNC: 1.9 MG/DL (ref 1.5–2.5)
MCH RBC QN AUTO: 28.8 PG (ref 27–33)
MCHC RBC AUTO-ENTMCNC: 31.3 G/DL (ref 33.6–35)
MCV RBC AUTO: 92.2 FL (ref 81.4–97.8)
METHADONE UR QL SCN: NEGATIVE
MICRO URNS: NORMAL
MONOCYTES # BLD AUTO: 0.35 K/UL (ref 0–0.85)
MONOCYTES NFR BLD AUTO: 8.3 % (ref 0–13.4)
NEUTROPHILS # BLD AUTO: 2.5 K/UL (ref 2–7.15)
NEUTROPHILS NFR BLD: 59.4 % (ref 44–72)
NITRITE UR QL STRIP.AUTO: NEGATIVE
NRBC # BLD AUTO: 0 K/UL
NRBC BLD-RTO: 0 /100 WBC
OPIATES UR QL SCN: NEGATIVE
OXYCODONE UR QL SCN: NEGATIVE
PCP UR QL SCN: NEGATIVE
PH UR STRIP.AUTO: 6 [PH] (ref 5–8)
PLATELET # BLD AUTO: 175 K/UL (ref 164–446)
PMV BLD AUTO: 12.5 FL (ref 9–12.9)
POTASSIUM SERPL-SCNC: 3.9 MMOL/L (ref 3.6–5.5)
PROCALCITONIN SERPL-MCNC: 0.07 NG/ML
PROPOXYPH UR QL SCN: NEGATIVE
PROT SERPL-MCNC: 5.6 G/DL (ref 6–8.2)
PROT UR QL STRIP: NEGATIVE MG/DL
RBC # BLD AUTO: 4.34 M/UL (ref 4.2–5.4)
RBC UR QL AUTO: NEGATIVE
RSV RNA SPEC QL NAA+PROBE: NEGATIVE
SARS-COV-2 RNA RESP QL NAA+PROBE: NOTDETECTED
SODIUM SERPL-SCNC: 140 MMOL/L (ref 135–145)
SP GR UR STRIP.AUTO: 1.02
SPECIMEN SOURCE: NORMAL
UROBILINOGEN UR STRIP.AUTO-MCNC: 0.2 MG/DL
VIT B12 SERPL-MCNC: 680 PG/ML (ref 211–911)
WBC # BLD AUTO: 4.2 K/UL (ref 4.8–10.8)

## 2021-01-19 PROCEDURE — 0241U HCHG SARS-COV-2 COVID-19 NFCT DS RESP RNA 4 TRGT MIC: CPT

## 2021-01-19 PROCEDURE — 86140 C-REACTIVE PROTEIN: CPT

## 2021-01-19 PROCEDURE — 700102 HCHG RX REV CODE 250 W/ 637 OVERRIDE(OP): Performed by: HOSPITALIST

## 2021-01-19 PROCEDURE — 36415 COLL VENOUS BLD VENIPUNCTURE: CPT

## 2021-01-19 PROCEDURE — 82042 OTHER SOURCE ALBUMIN QUAN EA: CPT

## 2021-01-19 PROCEDURE — 86592 SYPHILIS TEST NON-TREP QUAL: CPT

## 2021-01-19 PROCEDURE — 99219 PR INITIAL OBSERVATION CARE,LEVL II: CPT | Performed by: HOSPITALIST

## 2021-01-19 PROCEDURE — 700111 HCHG RX REV CODE 636 W/ 250 OVERRIDE (IP): Performed by: HOSPITALIST

## 2021-01-19 PROCEDURE — 700111 HCHG RX REV CODE 636 W/ 250 OVERRIDE (IP): Performed by: EMERGENCY MEDICINE

## 2021-01-19 PROCEDURE — 85025 COMPLETE CBC W/AUTO DIFF WBC: CPT

## 2021-01-19 PROCEDURE — 81003 URINALYSIS AUTO W/O SCOPE: CPT

## 2021-01-19 PROCEDURE — 80053 COMPREHEN METABOLIC PANEL: CPT

## 2021-01-19 PROCEDURE — 99285 EMERGENCY DEPT VISIT HI MDM: CPT

## 2021-01-19 PROCEDURE — 82746 ASSAY OF FOLIC ACID SERUM: CPT

## 2021-01-19 PROCEDURE — 96374 THER/PROPH/DIAG INJ IV PUSH: CPT

## 2021-01-19 PROCEDURE — 96376 TX/PRO/DX INJ SAME DRUG ADON: CPT

## 2021-01-19 PROCEDURE — 82607 VITAMIN B-12: CPT

## 2021-01-19 PROCEDURE — 86255 FLUORESCENT ANTIBODY SCREEN: CPT

## 2021-01-19 PROCEDURE — 84157 ASSAY OF PROTEIN OTHER: CPT

## 2021-01-19 PROCEDURE — 88108 CYTOPATH CONCENTRATE TECH: CPT

## 2021-01-19 PROCEDURE — 72125 CT NECK SPINE W/O DYE: CPT

## 2021-01-19 PROCEDURE — 96375 TX/PRO/DX INJ NEW DRUG ADDON: CPT

## 2021-01-19 PROCEDURE — 83916 OLIGOCLONAL BANDS: CPT | Mod: 91

## 2021-01-19 PROCEDURE — 72131 CT LUMBAR SPINE W/O DYE: CPT

## 2021-01-19 PROCEDURE — G0378 HOSPITAL OBSERVATION PER HR: HCPCS

## 2021-01-19 PROCEDURE — A9270 NON-COVERED ITEM OR SERVICE: HCPCS | Performed by: HOSPITALIST

## 2021-01-19 PROCEDURE — 83735 ASSAY OF MAGNESIUM: CPT

## 2021-01-19 PROCEDURE — 82962 GLUCOSE BLOOD TEST: CPT

## 2021-01-19 PROCEDURE — 72128 CT CHEST SPINE W/O DYE: CPT

## 2021-01-19 PROCEDURE — 82784 ASSAY IGA/IGD/IGG/IGM EACH: CPT | Mod: 91

## 2021-01-19 PROCEDURE — 99204 OFFICE O/P NEW MOD 45 MIN: CPT | Performed by: PSYCHIATRY & NEUROLOGY

## 2021-01-19 PROCEDURE — 80307 DRUG TEST PRSMV CHEM ANLYZR: CPT

## 2021-01-19 PROCEDURE — 82945 GLUCOSE OTHER FLUID: CPT

## 2021-01-19 PROCEDURE — 82040 ASSAY OF SERUM ALBUMIN: CPT

## 2021-01-19 PROCEDURE — 84145 PROCALCITONIN (PCT): CPT

## 2021-01-19 PROCEDURE — 70450 CT HEAD/BRAIN W/O DYE: CPT

## 2021-01-19 PROCEDURE — 87483 CNS DNA AMP PROBE TYPE 12-25: CPT

## 2021-01-19 RX ORDER — SODIUM CHLORIDE 9 MG/ML
INJECTION, SOLUTION INTRAVENOUS CONTINUOUS
Status: DISCONTINUED | OUTPATIENT
Start: 2021-01-19 | End: 2021-01-19

## 2021-01-19 RX ORDER — POTASSIUM CHLORIDE 20 MEQ/1
40 TABLET, EXTENDED RELEASE ORAL 2 TIMES DAILY
Status: DISCONTINUED | OUTPATIENT
Start: 2021-01-19 | End: 2021-01-22 | Stop reason: HOSPADM

## 2021-01-19 RX ORDER — ZIPRASIDONE HYDROCHLORIDE 40 MG/1
40 CAPSULE ORAL 2 TIMES DAILY
Status: DISCONTINUED | OUTPATIENT
Start: 2021-01-19 | End: 2021-01-22 | Stop reason: HOSPADM

## 2021-01-19 RX ORDER — METHOCARBAMOL 750 MG/1
750 TABLET, FILM COATED ORAL
Status: DISCONTINUED | OUTPATIENT
Start: 2021-01-19 | End: 2021-01-22 | Stop reason: HOSPADM

## 2021-01-19 RX ORDER — CHOLECALCIFEROL (VITAMIN D3) 125 MCG
10 CAPSULE ORAL
Status: DISCONTINUED | OUTPATIENT
Start: 2021-01-19 | End: 2021-01-22 | Stop reason: HOSPADM

## 2021-01-19 RX ORDER — MYCOPHENOLATE MOFETIL 250 MG/1
1000 CAPSULE ORAL 2 TIMES DAILY
Status: DISCONTINUED | OUTPATIENT
Start: 2021-01-19 | End: 2021-01-22 | Stop reason: HOSPADM

## 2021-01-19 RX ORDER — MORPHINE SULFATE 4 MG/ML
4 INJECTION, SOLUTION INTRAMUSCULAR; INTRAVENOUS ONCE
Status: COMPLETED | OUTPATIENT
Start: 2021-01-19 | End: 2021-01-19

## 2021-01-19 RX ORDER — OXYCODONE HYDROCHLORIDE 5 MG/1
2.5 TABLET ORAL
Status: DISCONTINUED | OUTPATIENT
Start: 2021-01-19 | End: 2021-01-22 | Stop reason: HOSPADM

## 2021-01-19 RX ORDER — ESOMEPRAZOLE MAGNESIUM 40 MG/1
40 CAPSULE, DELAYED RELEASE ORAL EVERY MORNING
COMMUNITY
End: 2021-04-15

## 2021-01-19 RX ORDER — PROMETHAZINE HYDROCHLORIDE 25 MG/1
12.5-25 SUPPOSITORY RECTAL EVERY 4 HOURS PRN
Status: DISCONTINUED | OUTPATIENT
Start: 2021-01-19 | End: 2021-01-22 | Stop reason: HOSPADM

## 2021-01-19 RX ORDER — BUSPIRONE HYDROCHLORIDE 10 MG/1
10 TABLET ORAL 3 TIMES DAILY
Status: DISCONTINUED | OUTPATIENT
Start: 2021-01-19 | End: 2021-01-22 | Stop reason: HOSPADM

## 2021-01-19 RX ORDER — DEXTROSE MONOHYDRATE 25 G/50ML
50 INJECTION, SOLUTION INTRAVENOUS
Status: DISCONTINUED | OUTPATIENT
Start: 2021-01-19 | End: 2021-01-22 | Stop reason: HOSPADM

## 2021-01-19 RX ORDER — SODIUM BICARBONATE 650 MG/1
650 TABLET ORAL 2 TIMES DAILY
Status: DISCONTINUED | OUTPATIENT
Start: 2021-01-19 | End: 2021-01-22 | Stop reason: HOSPADM

## 2021-01-19 RX ORDER — IPRATROPIUM BROMIDE AND ALBUTEROL SULFATE 2.5; .5 MG/3ML; MG/3ML
3 SOLUTION RESPIRATORY (INHALATION) EVERY 4 HOURS PRN
Status: DISCONTINUED | OUTPATIENT
Start: 2021-01-19 | End: 2021-01-22 | Stop reason: HOSPADM

## 2021-01-19 RX ORDER — ALBUTEROL SULFATE 90 UG/1
2 AEROSOL, METERED RESPIRATORY (INHALATION) EVERY 6 HOURS PRN
Status: DISCONTINUED | OUTPATIENT
Start: 2021-01-19 | End: 2021-01-22 | Stop reason: HOSPADM

## 2021-01-19 RX ORDER — PROMETHAZINE HYDROCHLORIDE 25 MG/1
12.5-25 TABLET ORAL EVERY 4 HOURS PRN
Status: DISCONTINUED | OUTPATIENT
Start: 2021-01-19 | End: 2021-01-22 | Stop reason: HOSPADM

## 2021-01-19 RX ORDER — LEVOTHYROXINE SODIUM 0.1 MG/1
100 TABLET ORAL
Status: DISCONTINUED | OUTPATIENT
Start: 2021-01-20 | End: 2021-01-22 | Stop reason: HOSPADM

## 2021-01-19 RX ORDER — OMEPRAZOLE 20 MG/1
40 CAPSULE, DELAYED RELEASE ORAL
Status: DISCONTINUED | OUTPATIENT
Start: 2021-01-20 | End: 2021-01-22 | Stop reason: HOSPADM

## 2021-01-19 RX ORDER — FLUOXETINE HYDROCHLORIDE 20 MG/1
40 CAPSULE ORAL DAILY
Status: DISCONTINUED | OUTPATIENT
Start: 2021-01-20 | End: 2021-01-22 | Stop reason: HOSPADM

## 2021-01-19 RX ORDER — PROCHLORPERAZINE EDISYLATE 5 MG/ML
5-10 INJECTION INTRAMUSCULAR; INTRAVENOUS EVERY 4 HOURS PRN
Status: DISCONTINUED | OUTPATIENT
Start: 2021-01-19 | End: 2021-01-22 | Stop reason: HOSPADM

## 2021-01-19 RX ORDER — BISACODYL 10 MG
10 SUPPOSITORY, RECTAL RECTAL
Status: DISCONTINUED | OUTPATIENT
Start: 2021-01-19 | End: 2021-01-22 | Stop reason: HOSPADM

## 2021-01-19 RX ORDER — OXYCODONE HYDROCHLORIDE 5 MG/1
5 TABLET ORAL
Status: DISCONTINUED | OUTPATIENT
Start: 2021-01-19 | End: 2021-01-22 | Stop reason: HOSPADM

## 2021-01-19 RX ORDER — TOPIRAMATE 25 MG/1
50 TABLET ORAL 2 TIMES DAILY
Status: DISCONTINUED | OUTPATIENT
Start: 2021-01-19 | End: 2021-01-22 | Stop reason: HOSPADM

## 2021-01-19 RX ORDER — ACETAMINOPHEN 325 MG/1
650 TABLET ORAL EVERY 6 HOURS PRN
Status: DISCONTINUED | OUTPATIENT
Start: 2021-01-19 | End: 2021-01-22 | Stop reason: HOSPADM

## 2021-01-19 RX ORDER — AMOXICILLIN 250 MG
2 CAPSULE ORAL 2 TIMES DAILY
Status: DISCONTINUED | OUTPATIENT
Start: 2021-01-19 | End: 2021-01-22 | Stop reason: HOSPADM

## 2021-01-19 RX ORDER — MORPHINE SULFATE 4 MG/ML
2 INJECTION, SOLUTION INTRAMUSCULAR; INTRAVENOUS
Status: DISCONTINUED | OUTPATIENT
Start: 2021-01-19 | End: 2021-01-22 | Stop reason: HOSPADM

## 2021-01-19 RX ORDER — ONDANSETRON 4 MG/1
4 TABLET, ORALLY DISINTEGRATING ORAL EVERY 4 HOURS PRN
Status: DISCONTINUED | OUTPATIENT
Start: 2021-01-19 | End: 2021-01-22 | Stop reason: HOSPADM

## 2021-01-19 RX ORDER — TRAZODONE HYDROCHLORIDE 100 MG/1
100 TABLET ORAL
Status: DISCONTINUED | OUTPATIENT
Start: 2021-01-19 | End: 2021-01-22 | Stop reason: HOSPADM

## 2021-01-19 RX ORDER — POLYETHYLENE GLYCOL 3350 17 G/17G
1 POWDER, FOR SOLUTION ORAL
Status: DISCONTINUED | OUTPATIENT
Start: 2021-01-19 | End: 2021-01-22 | Stop reason: HOSPADM

## 2021-01-19 RX ORDER — ONDANSETRON 2 MG/ML
4 INJECTION INTRAMUSCULAR; INTRAVENOUS EVERY 4 HOURS PRN
Status: DISCONTINUED | OUTPATIENT
Start: 2021-01-19 | End: 2021-01-22 | Stop reason: HOSPADM

## 2021-01-19 RX ORDER — ONDANSETRON 2 MG/ML
4 INJECTION INTRAMUSCULAR; INTRAVENOUS ONCE
Status: COMPLETED | OUTPATIENT
Start: 2021-01-19 | End: 2021-01-19

## 2021-01-19 RX ORDER — OXCARBAZEPINE 300 MG/1
300 TABLET, FILM COATED ORAL 2 TIMES DAILY
Status: DISCONTINUED | OUTPATIENT
Start: 2021-01-19 | End: 2021-01-22 | Stop reason: HOSPADM

## 2021-01-19 RX ORDER — ACETAZOLAMIDE 500 MG/1
500 CAPSULE, EXTENDED RELEASE ORAL 2 TIMES DAILY
Status: DISCONTINUED | OUTPATIENT
Start: 2021-01-19 | End: 2021-01-22 | Stop reason: HOSPADM

## 2021-01-19 RX ADMIN — METHOCARBAMOL 750 MG: 750 TABLET ORAL at 20:24

## 2021-01-19 RX ADMIN — TRAZODONE HYDROCHLORIDE 100 MG: 100 TABLET ORAL at 22:58

## 2021-01-19 RX ADMIN — ONDANSETRON 4 MG: 2 INJECTION INTRAMUSCULAR; INTRAVENOUS at 16:06

## 2021-01-19 RX ADMIN — MORPHINE SULFATE 2 MG: 4 INJECTION INTRAVENOUS at 21:30

## 2021-01-19 RX ADMIN — MORPHINE SULFATE 4 MG: 4 INJECTION INTRAVENOUS at 16:06

## 2021-01-19 RX ADMIN — Medication 10 MG: at 22:58

## 2021-01-19 RX ADMIN — OXYCODONE 5 MG: 5 TABLET ORAL at 20:24

## 2021-01-19 ASSESSMENT — ENCOUNTER SYMPTOMS
BLURRED VISION: 0
SPUTUM PRODUCTION: 0
FEVER: 0
TINGLING: 1
WHEEZING: 0
DEPRESSION: 0
ORTHOPNEA: 0
DIARRHEA: 0
SINUS PAIN: 0
STRIDOR: 0
FALLS: 0
TREMORS: 0
FLANK PAIN: 0
NECK PAIN: 0
HEARTBURN: 0
CHILLS: 0
POLYDIPSIA: 0
SHORTNESS OF BREATH: 0
SORE THROAT: 0
HEADACHES: 0
BRUISES/BLEEDS EASILY: 0
DOUBLE VISION: 0
SENSORY CHANGE: 1
COUGH: 0
DIAPHORESIS: 0
HEMOPTYSIS: 0
VOMITING: 0
BACK PAIN: 1
BLOOD IN STOOL: 0
MYALGIAS: 0
WEAKNESS: 1
FOCAL WEAKNESS: 1
EYE PAIN: 0
PHOTOPHOBIA: 0
DIZZINESS: 0
ABDOMINAL PAIN: 0
PND: 0
NAUSEA: 0
PALPITATIONS: 0
CLAUDICATION: 0
CONSTIPATION: 0
HALLUCINATIONS: 0

## 2021-01-19 ASSESSMENT — PAIN DESCRIPTION - PAIN TYPE
TYPE: ACUTE PAIN

## 2021-01-19 ASSESSMENT — LIFESTYLE VARIABLES
SUBSTANCE_ABUSE: 0
DO YOU DRINK ALCOHOL: NO

## 2021-01-19 ASSESSMENT — FIBROSIS 4 INDEX: FIB4 SCORE: 0.62
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
## 2021-01-19 NOTE — ED PROVIDER NOTES
ED Provider Note    CHIEF COMPLAINT  Chief Complaint   Patient presents with   • T-5000 FALL   • Head Injury       HPI  Kristin Balderrama is a 30 y.o. female here for evaluation of head injury and back pain.  Patient states that she rolled out of bed earlier today, and fell on the floor.  Patient states that she fell down about 3 to 4 feet onto her side/face.  She states that she is having trouble moving her lower extremities, but does have sensation intact.  She says that this is new for her.  She denies any fever chills or vomiting.  She has no chest pain or shortness of breath.  She has no headache.  Patient does take aspirin.      ROS  See HPI for further details, o/w negative.     PAST MEDICAL HISTORY   has a past medical history of Abdominal pain, Anginal syndrome, Apnea, sleep, Arrhythmia, Arthritis, ASTHMA, Atrial fibrillation ([facility]), Back pain, Borderline personality disorder ([facility]), Breath shortness, Bronchitis, Cardiac arrhythmia, Chickenpox, Chronic obstructive pulmonary disease (HCC), Chronic UTI (9/18/2010), Cough, Daytime sleepiness, Depression, Diabetes ([facility]), Diarrhea, Disorder of thyroid, Fall, Fatigue, Frequent headaches, Gasping for breath, Gynecological disorder, Headache(784.0), Heart burn, History of falling, Indigestion, Migraine, 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[facility]), 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[facility]), Nausea, Obesity, Other fatigue (6/29/2020), Pain (08-15-12), Painful joint, PCOS (polycystic ovarian syndrome), Pneumonia (2012), Psychosis ([facility]), Ringing in ears, Scoliosis, Shortness of breath, Sinus tachycardia (10/31/2013), Sleep apnea, Snoring, Tonsillitis, 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[facility]), Tuberculosis, Urinary bladder disorder, Urinary incontinence, Weakness, and Wears glasses.    SOCIAL HISTORY  Social History     Tobacco Use   • Smoking status: Never Smoker   • Smokeless tobacco: Never Used   Substance and Sexual Activity   • Alcohol use: No     Alcohol/week: 0.0 oz     Frequency: Never     Binge  "frequency: Never   • Drug use: Not Currently     Frequency: 7.0 times per week     Types: Marijuana   • Sexual activity: Not Currently     Birth control/protection: Pill       Family History  No bleeding disorders noted    SURGICAL HISTORY   has a past surgical history that includes neuro dest facet l/s w/ig sngl (4/21/2015); recovery (1/27/2016); [place] by laparoscopy (8/29/2010); lumbar fusion anterior (8/21/2012); other cardiac surgery (1/2016); tonsillectomy; bowel stimulator insertion (7/13/2016); gastroscopy with balloon dilatation (N/A, 1/4/2017); muscle biopsy (Right, 1/26/2017); other abdominal surgery; and laminotomy.    CURRENT MEDICATIONS  Home Medications    **Home medications have not yet been reviewed for this encounter**         ALLERGIES  Allergies   Allergen Reactions   • Depakote [Divalproex Sodium] Unspecified     Muscle spasms/muscle pain and weakness     • Amitriptyline Unspecified     Headaches     • Aripiprazole [Abilify] Unspecified     Headaches/muscle twitching     • Clindamycin Nausea     Even with food     • Flagyl [Metronidazole Hcl] Unspecified     \"eye problems\"     • Flomax [Tamsulosin Hydrochloride] Swelling   • Levaquin Unspecified     Severe muscle cramps in legs  RXN=unknown   • Metformin Unspecified     Increased lactic acid      • Tape Rash     Tears skin off  coban with Tegaderm tape ok intermittently  RXN=ongoing   • Vancomycin Itching     Pt becomes flushed in face and chest.   RXN=7/10/16   • Wound Dressing Adhesive Hives     By pt report   • Ampicillin Rash     Pt reports that she received a rash    • Ciprofloxacin    • Keflex Rash     Pt states she gets a rash with this medication  Tolerates ceftriaxone   • Levofloxacin Unspecified     Leg muscle cramps   • Metronidazole Rash     .   • Penicillins Hives   • Sulfa Drugs Myalgia     Muscle pain and weakness   • Tamsulosin Swelling   • Valproic Acid Rash     .       CT-TSPINE W/O PLUS RECONS   Final Result      No fracture or " subluxation is seen in the thoracic spine.      Multilevel Schmorl's nodes and degenerative changes.         CT-LSPINE W/O PLUS RECONS   Final Result      1.  No acute fracture or listhesis in the lumbar spine.   2.  Sequela of L4-5 anterior fusion, with no evidence of hardware complication.   3.  Nonobstructing right nephrolithiasis.   4.  A 3.5 cm right adnexal cyst.      CT-HEAD W/O   Final Result      No acute intracranial abnormality is identified.      CT-CSPINE WITHOUT PLUS RECONS   Final Result      No acute fracture or listhesis in the cervical spine.      DX-MYELOGRAPHY VIA LUMBAR INJ LUMBOSACRAL    (Results Pending)   CT-CSPINE WITH PLUS RECONS    (Results Pending)   CT-LSPINE WITH PLUS RECONS    (Results Pending)   CT-TSPINE WITH PLUS RECONS    (Results Pending)         REVIEW OF SYSTEMS  See HPI for further details. Review of systems as above, otherwise all other systems are negative.     PHYSICAL EXAM  Constitutional: Well developed, well nourished. No acute distress.  HEENT: Normocephalic, atraumatic. Posterior pharynx clear and moist.  Eyes:  EOMI. Normal sclera.  Neck: Supple, Full range of motion, nontender.  Chest/Pulmonary: clear to ausculation. Symmetrical expansion.   Cardio: Regular rate and rhythm with no murmur.   Abdomen: Soft, nontender. No peritoneal signs. No guarding. No palpable masses.  Back: No CVA tenderness,  L1,2 tender midline, no step offs.  Musculoskeletal: No deformity, no edema, neurovascular intact.   Neuro: Clear speech, appropriate, cooperative, cranial nerves II-XII grossly intact. flacidity to the lower ext.  Minimal movement of great toes bilaterally, no sensation.   Psych: Normal mood and affect,  No anxiety     PROCEDURES     MEDICAL RECORD  I have reviewed patient's medical record and pertinent results are listed.    COURSE & MEDICAL DECISION MAKING  I have reviewed any medical record information, laboratory studies and radiographic results as noted above.    12:55  PM  I spoke to Dr. Lynn.  He recommends a MRI of the pts back, and neuro to see, rather than trauma at this time.     4:19 PM  The pt will be admitted to the hospitalist service, and neurology has already seen the pt.      FINAL IMPRESSION  Low back pain  Paralysis         Electronically signed by: Valente Castellon D.O., 1/19/2021 12:20 PM

## 2021-01-19 NOTE — ED TRIAGE NOTES
BIB EMS from home with   Chief Complaint   Patient presents with   • T-5000 FALL   • Head Injury   Rolled out of bed and landed on the floor today, unknown time. Denies LOC. Takes ASA daily. States she hit right forehead on ground. Faint red vickie noted to R forehead. States she has decreased sensation to bilateral LE. LLE trace contraction; RLE no movement. CT complete. Report to Magnus HASSAN.

## 2021-01-19 NOTE — ED NOTES
Multiple attempts to place IV, PIV place with US assistance, blood sent to lab.      Urine sent to lab for UDS.  PTA jara in place, awaiting jara catheter change for UA.

## 2021-01-19 NOTE — CONSULTS
"Chief Complaint   Patient presents with   • T-5000 FALL   • Head Injury       Problem List Items Addressed This Visit     None        Neurology Consultation     History of present illness:  This is a 30-year old female; well known to our outpatient and inpatient neurology services, with PMhx CRION syndrome, transverse myelitis, disabled at baseline however ambulatory, bladder stimulator implanted, benign intracranial hypertension without papilledema, migraine, and suspected underlying psychiatric component of the above who presented to [facility] On 1/19/21 for a chief complaint of fall and subsequent BLE weakness.   The patient states that she felt to be in her usual state of healt last night; went to sleep. Around 0600, she reports that she rolled over and fell out of bed; she initially states that she fell onto her back/mid back, however then states that she \"bumped her head.\" At any rate, she had difficulty getting up, thus EMS was called. On arrival here, CT head unremarkable, CT C, T and L spine all without fracture or subluxations. Despite this, patient has persistent BLE weakness, worse than her baseline, prompting neurological consultation.   Currently patient is sitting up in bed; admits to persistent headache, \"all over,\" and endorses severe mid-back pain. She denies numbness/new numbness, admits to \"tingling\" to groin/saddle area; denies new problem with vision, speech or swallowing.     Neurology has been consulted by Dr. Valente Regalado to further evaluate the findings noted above.      Past medical history:   Past Medical History:   Diagnosis Date   • Abdominal pain    • Anginal syndrome     random chest pain especially with tachycardia   • Apnea, sleep    • Arrhythmia     \"sinus tachycardia\", cariologist, Dr. Kumar; ablation 2/2016   • Arthritis     osteo   • ASTHMA     when around smoke   • Atrial fibrillation (HCC)    • Back pain    • Borderline personality disorder (HCC)    • Breath " "shortness     with tachycardia   • Bronchitis    • Cardiac arrhythmia    • Chickenpox    • Chronic obstructive pulmonary disease (HCC)    • Chronic UTI 9/18/2010   • Cough    • Daytime sleepiness    • Depression    • Diabetes (HCC)    • Diarrhea    • Disorder of thyroid    • Fall    • Fatigue    • Frequent headaches    • Gasping for breath    • Gynecological disorder     PCOS   • Headache(784.0)    • Heart burn    • History of falling    • Indigestion    • Migraine    • 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HCC)    • 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HCC)    • Nausea    • Obesity    • Other fatigue 6/29/2020   • Pain 08-15-12    back, 7/10   • Painful joint    • PCOS (polycystic ovarian syndrome)    • Pneumonia 2012   • Psychosis ([facility])    • Ringing in ears    • Scoliosis    • Shortness of breath    • Sinus tachycardia 10/31/2013   • Sleep apnea     CPAP \"pulmonary doctor took me off mid year 2016\"   • Snoring    • Tonsillitis    • 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HCC)    • Tuberculosis     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   • Urinary bladder disorder     Suprapubic cath   • Urinary incontinence    • Weakness    • Wears glasses        Past surgical history:   Past Surgical History:   Procedure Laterality Date   • MUSCLE BIOPSY Right 1/26/2017    Procedure: MUSCLE BIOPSY - THIGH;  Surgeon: Isidro Vigil M.D.;  Location: [facility];  Service:    • GASTROSCOPY WITH BALLOON DILATATION N/A 1/4/2017    Procedure: GASTROSCOPY WITH DILATATION;  Surgeon: Torres Vargas M.D.;  Location: [facility];  Service:    • BOWEL STIMULATOR INSERTION  7/13/2016    Procedure: BOWEL STIMULATOR INSERTION FOR PERMANENT INTERSTIM SACRAL IMPLANT;  Surgeon: Joe Noyola M.D.;  Location: [facility];  Service:    • RECOVERY  1/27/2016    Procedure: CATH LAB EP STUDY WITH SINUS NODE MODIFICATION ABHINAV;  Surgeon: Recoveryonly Surgery;  Location: SURGERY PRE-POST PROC UNIT [facility] [place], [place];  Service:    • OTHER CARDIAC SURGERY  1/2016    " cardiac ablation   • NEURO DEST FACET L/S W/IG SNGL  4/21/2015    Performed by Reza Tabor at [facility]ERY [facility]   • LUMBAR FUSION ANTERIOR  8/21/2012    Performed by SUSIE SAWANT at SURGERY [facility] ORS   • ALYSSA BY LAPAROSCOPY  8/29/2010    Performed by SHYAY JOHNS at SURGERY [facility] ORS   • LAMINOTOMY     • OTHER ABDOMINAL SURGERY     • TONSILLECTOMY      tonsillectomy       Family history:   Family History   Problem Relation Age of Onset   • Hypertension Mother    • Sleep Apnea Mother    • Heart Disease Mother    • Other Mother         hypothryod   • Hypertension Maternal Uncle    • Heart Disease Maternal Grandmother    • Hypertension Maternal Grandmother    • No Known Problems Sister    • Other Sister         Narcolepsy;fibromyalsia;bone;nerve   • Genitourinary () Problems Sister         endometriosis       Social history:   Social History     Socioeconomic History   • Marital status: Single     Spouse name: Not on file   • Number of children: Not on file   • Years of education: Not on file   • Highest education level: Not on file   Occupational History   • Not on file   Social Needs   • Financial resource strain: Not hard at all   • Food insecurity     Worry: Never true     Inability: Never true   • Transportation needs     Medical: No     Non-medical: No   Tobacco Use   • Smoking status: Never Smoker   • Smokeless tobacco: Never Used   Substance and Sexual Activity   • Alcohol use: No     Alcohol/week: 0.0 oz     Frequency: Never     Binge frequency: Never   • Drug use: Not Currently     Frequency: 7.0 times per week     Types: Marijuana   • Sexual activity: Not Currently     Birth control/protection: Pill   Lifestyle   • Physical activity     Days per week: Not on file     Minutes per session: Not on file   • Stress: Not on file   Relationships   • Social connections     Talks on phone: Not on file     Gets together: Not on file     Attends [employer] service: Not on file     Active  member of club or organization: Not on file     Attends meetings of clubs or organizations: Not on file     Relationship status: Not on file   • Intimate partner violence     Fear of current or ex partner: Not on file     Emotionally abused: Not on file     Physically abused: Not on file     Forced sexual activity: Not on file   Other Topics Concern   • Not on file   Social History Narrative    ** Merged History Encounter **            Current medications:   Current Facility-Administered Medications   Medication Dose   • senna-docusate (PERICOLACE or SENOKOT S) 8.6-50 MG per tablet 2 Tab  2 Tab    And   • polyethylene glycol/lytes (MIRALAX) PACKET 1 Packet  1 Packet    And   • magnesium hydroxide (MILK OF MAGNESIA) suspension 30 mL  30 mL    And   • bisacodyl (DULCOLAX) suppository 10 mg  10 mg   • enoxaparin (LOVENOX) inj 40 mg  40 mg   • acetaminophen (Tylenol) tablet 650 mg  650 mg   • Pharmacy Consult Request ...Pain Management Review 1 Each  1 Each    And   • oxyCODONE immediate-release (ROXICODONE) tablet 2.5 mg  2.5 mg    And   • oxyCODONE immediate-release (ROXICODONE) tablet 5 mg  5 mg    And   • morphine (pf) 4 mg/mL injection 2 mg  2 mg   • ondansetron (ZOFRAN) syringe/vial injection 4 mg  4 mg   • ondansetron (ZOFRAN ODT) dispertab 4 mg  4 mg   • promethazine (PHENERGAN) tablet 12.5-25 mg  12.5-25 mg   • promethazine (PHENERGAN) suppository 12.5-25 mg  12.5-25 mg   • prochlorperazine (COMPAZINE) injection 5-10 mg  5-10 mg   • acetaZOLAMIDE SR (DIAMOX) capsule 500 mg  500 mg   • albuterol inhaler 2 Puff  2 Puff   • aspirin EC (ECOTRIN) tablet 81 mg  81 mg   • busPIRone (BUSPAR) tablet 10 mg  10 mg   • fluoxetine (PROZAC) CAPS 40 mg  40 mg   • esomeprazole magnesium (NEXIUM) suspension 40 mg  40 mg   • ipratropium-albuterol (DUONEB) nebulizer solution  3 mL   • ivabradine (Corlanor) tablet 7.5 mg  7.5 mg   • levothyroxine (SYNTHROID) tablet 100 mcg  100 mcg   • Melatonin TABS 10 mg  10 mg   • methocarbamol  (ROBAXIN) tablet 750 mg  750 mg   • mycophenolate (CELLCEPT) TABS 1,000 mg  1,000 mg   • OXcarbazepine (TRILEPTAL) tablet 300 mg  300 mg   • topiramate (TOPAMAX) tablet 50 mg  50 mg   • sodium bicarbonate tablet 650 mg  650 mg   • potassium chloride (KLOR-CON) 20 MEQ packet 40 mEq  40 mEq   • traZODone (DESYREL) tablet 100 mg  100 mg   • ziprasidone (GEODON) capsule 40 mg  40 mg   • insulin regular (HumuLIN R,NovoLIN R) injection  2-9 Units    And   • glucose 4 g chewable tablet 16 g  16 g    And   • dextrose 50% (D50W) injection 50 mL  50 mL     Current Outpatient Medications   Medication   • esomeprazole magnesium (NEXIUM) 40 MG Pack   • busPIRone (BUSPAR) 10 MG Tab tablet   • levothyroxine (SYNTHROID) 100 MCG Tab   • Melatonin 10 MG Tab   • potassium chloride (KLOR-CON) 20 MEQ Pack   • NS SOLN 100 mL with meropenem 500 MG SOLR 1,000 mg   • Dulaglutide (TRULICITY) 3 MG/0.5ML Solution Pen-injector   • fluoxetine (PROZAC) 40 MG capsule   • OXcarbazepine (TRILEPTAL) 300 MG Tab   • ziprasidone (GEODON) 40 MG Cap   • busPIRone (BUSPAR) 15 MG tablet   • albuterol 108 (90 Base) MCG/ACT Aero Soln inhalation aerosol   • levothyroxine (SYNTHROID) 75 MCG Tab   • Mirabegron ER (MYRBETRIQ) 50 MG TABLET SR 24 HR   • acetaminophen (TYLENOL) 500 MG Tab   • sodium bicarbonate 325 MG Tab   • topiramate (TOPAMAX) 50 MG tablet   • polyethylene glycol 3350 (MIRALAX) 17 GM/SCOOP Powder   • ondansetron (ZOFRAN ODT) 4 MG TABLET DISPERSIBLE   • ipratropium-albuterol (DUONEB) 0.5-2.5 (3) MG/3ML nebulizer solution   • mycophenolate (CELLCEPT) 500 MG tablet   • methocarbamol (ROBAXIN) 750 MG Tab   • calcium carbonate (TUMS EX) 750 MG chewable tablet   • vitamin D, Ergocalciferol, (DRISDOL) 1.25 MG (84270 UT) Cap capsule   • insulin glargine (LANTUS) 100 UNIT/ML Solution   • traZODone (DESYREL) 100 MG Tab   • omeprazole (PRILOSEC) 20 MG delayed-release capsule   • oxybutynin (DITROPAN) 5 MG Tab   • acetaZOLAMIDE SR (DIAMOX) 500 MG CAPSULE SR  "12 HR   • ivabradine (CORLANOR) 7.5 MG Tab tablet   • aspirin EC (ECOTRIN) 81 MG Tablet Delayed Response       Medication Allergy:  Allergies   Allergen Reactions   • Depakote [Divalproex Sodium] Unspecified     Muscle spasms/muscle pain and weakness     • Amitriptyline Unspecified     Headaches     • Aripiprazole [Abilify] Unspecified     Headaches/muscle twitching     • Clindamycin Nausea     Even with food     • Flagyl [Metronidazole Hcl] Unspecified     \"eye problems\"     • Flomax [Tamsulosin Hydrochloride] Swelling   • Levaquin Unspecified     Severe muscle cramps in legs  RXN=unknown   • Metformin Unspecified     Increased lactic acid      • Tape Rash     Tears skin off  coban with Tegaderm tape ok intermittently  RXN=ongoing   • Vancomycin Itching     Pt becomes flushed in face and chest.   RXN=7/10/16   • Wound Dressing Adhesive Hives     By pt report   • Ampicillin Rash     Pt reports that she received a rash    • Ciprofloxacin    • Keflex Rash     Pt states she gets a rash with this medication  Tolerates ceftriaxone   • Levofloxacin Unspecified     Leg muscle cramps   • Metronidazole Rash     .   • Penicillins Hives   • Sulfa Drugs Myalgia     Muscle pain and weakness   • Tamsulosin Swelling   • Valproic Acid Rash     .       Review of systems:   Constitutional: denies fever, night sweats, weight loss.   Eyes: denies acute vision change, eye pain or secretion.   Ears, Nose, Mouth, Throat: denies nasal secretion, nasal bleeding, difficulty swallowing, hearing loss, tinnitus, vertigo, ear pain, acute dental problems, oral ulcers or lesions.   Endocrine: denies recent weight changes, heat or cold intolerance, polyuria, polydypsia, polyphagia,abnormal hair growth.  Cardiovascular: denies new onset of chest pain, palpitations, syncope, or dyspnea of exertion.  Pulmonary: denies shortness of breath, new onset of cough, hemoptysis, wheezing, chest pain or flu-like symptoms.   GI: denies nausea, vomiting, " diarrhea, GI bleeding, change in appetite, abdominal pain, and change in bowel habits.  : denies dysuria, urinary incontinence, hematuria.  Heme/oncology: denies history of easy bruising or bleeding. No history of cancer, DVTor PE.  Allergy/immunology: denies hives/urticaria, or itching.   Dermatologic: denies new rash, or new skin lesions.  Musculoskeletal:denies joint swelling or pain, muscle pain, neck and back pain.   Neurologic: As noted above.   Psychiatric: denies symptoms of depression, anxiety, hallucinations, mood swings or changes, suicidal or homicidal thoughts.     Physical examination:   Vitals:    01/19/21 1300 01/19/21 1400 01/19/21 1519 01/19/21 1601   BP: 108/72 112/64 130/73 132/89   Pulse: 61 60 67 63   Resp: 16 18 19 17   Temp:       TempSrc:       SpO2: 99% 98% 98% 99%   Weight:       Height:         General: Patient in no acute distress, pleasant and cooperative.  HEENT: Normocephalic, no signs of acute trauma.   Neck: supple, no meningeal signs or carotid bruits. There is normal range of motion. No tenderness on exam.   Chest: clear to auscultation. No cough.   CV: RRR, no murmurs.   Skin: no signs of acute rashes or trauma.   Musculoskeletal: joints exhibit full range of motion, without any pain to palpation. There are no signs of joint or muscle swelling. There is no tenderness to deep palpation of muscles.   Psychiatric: No hallucinatory behavior. Denies symptoms of depression or suicidal ideation. Mood and affect appear normal on exam.     NEUROLOGICAL EXAM:   Mental status, orientation: Awake, alert and fully oriented.   Speech and language: speech is clear and fluent. The patient is able to name, repeat and comprehend.   Memory: There is intact recollection of recent and remote events.   Cranial nerve exam: Pupils are 3-4 mm bilaterally and equally reactive to light and accommodation. Visual fields are intact by confrontation. There is no nystagmus on primary or secondary gaze. Intact  full EOM in all directions of gaze. Face appears symmetric. Sensation in the face is intact to light touch. Uvula is midline. Palate elevates symmetrically. Tongue is midline and without any signs of tongue biting or fasciculations. Shoulder shrug is intact bilaterally.   Motor exam: Strength is 5/5 in BUE; endorses pain with examination. 0-1/5 to BLE, with positive Morris test BL. Tone is normal. No abnormal movements were seen on exam.   Sensory exam Decreased/dull sensation to BLE (baseline); otherwise normal.   Deep tendon reflexes:  3 to BUE; areflexic to BLE. Plantar responses are mute. There is no clonus.   Coordination: shows a normal finger-nose-finger. Normal rapidly alternating movements.   Gait: Not assessed as patient is currently unable.       ANCILLARY DATA REVIEWED:     Lab Data Review:  Recent Results (from the past 24 hour(s))   CBC WITH DIFFERENTIAL    Collection Time: 01/19/21  3:04 PM   Result Value Ref Range    WBC 4.2 (L) 4.8 - 10.8 K/uL    RBC 4.34 4.20 - 5.40 M/uL    Hemoglobin 12.5 12.0 - 16.0 g/dL    Hematocrit 40.0 37.0 - 47.0 %    MCV 92.2 81.4 - 97.8 fL    MCH 28.8 27.0 - 33.0 pg    MCHC 31.3 (L) 33.6 - 35.0 g/dL    RDW 46.9 35.9 - 50.0 fL    Platelet Count 175 164 - 446 K/uL    MPV 12.5 9.0 - 12.9 fL    Neutrophils-Polys 59.40 44.00 - 72.00 %    Lymphocytes 29.10 22.00 - 41.00 %    Monocytes 8.30 0.00 - 13.40 %    Eosinophils 2.10 0.00 - 6.90 %    Basophils 0.90 0.00 - 1.80 %    Immature Granulocytes 0.20 0.00 - 0.90 %    Nucleated RBC 0.00 /100 WBC    Neutrophils (Absolute) 2.50 2.00 - 7.15 K/uL    Lymphs (Absolute) 1.23 1.00 - 4.80 K/uL    Monos (Absolute) 0.35 0.00 - 0.85 K/uL    Eos (Absolute) 0.09 0.00 - 0.51 K/uL    Baso (Absolute) 0.04 0.00 - 0.12 K/uL    Immature Granulocytes (abs) 0.01 0.00 - 0.11 K/uL    NRBC (Absolute) 0.00 K/uL   COMP METABOLIC PANEL    Collection Time: 01/19/21  3:04 PM   Result Value Ref Range    Sodium 140 135 - 145 mmol/L    Potassium 3.9 3.6 - 5.5  mmol/L    Chloride 115 (H) 96 - 112 mmol/L    Co2 15 (L) 20 - 33 mmol/L    Anion Gap 10.0 7.0 - 16.0    Glucose 83 65 - 99 mg/dL    Bun 4 (L) 8 - 22 mg/dL    Creatinine 0.62 0.50 - 1.40 mg/dL    Calcium 8.8 8.5 - 10.5 mg/dL    AST(SGOT) 13 12 - 45 U/L    ALT(SGPT) 20 2 - 50 U/L    Alkaline Phosphatase 67 30 - 99 U/L    Total Bilirubin 0.4 0.1 - 1.5 mg/dL    Albumin 4.0 3.2 - 4.9 g/dL    Total Protein 5.6 (L) 6.0 - 8.2 g/dL    Globulin 1.6 (L) 1.9 - 3.5 g/dL    A-G Ratio 2.5 g/dL   URINALYSIS CULTURE, IF INDICATED    Collection Time: 01/19/21  3:04 PM    Specimen: Urine   Result Value Ref Range    Color Yellow     Character Clear     Specific Gravity 1.025 <1.035    Ph 6.0 5.0 - 8.0    Glucose Negative Negative mg/dL    Ketones Negative Negative mg/dL    Protein Negative Negative mg/dL    Bilirubin Negative Negative    Urobilinogen, Urine 0.2 Negative    Nitrite Negative Negative    Leukocyte Esterase Negative Negative    Occult Blood Negative Negative    Micro Urine Req see below    URINE DRUG SCREEN    Collection Time: 01/19/21  3:04 PM   Result Value Ref Range    Amphetamines Urine Negative Negative    Barbiturates Negative Negative    Benzodiazepines Negative Negative    Cocaine Metabolite Negative Negative    Methadone Negative Negative    Opiates Negative Negative    Oxycodone Negative Negative    Phencyclidine -Pcp Negative Negative    Propoxyphene Negative Negative    Cannabinoid Metab Negative Negative   ESTIMATED GFR    Collection Time: 01/19/21  3:04 PM   Result Value Ref Range    GFR If African American >60 >60 mL/min/1.73 m 2    GFR If Non African American >60 >60 mL/min/1.73 m 2   COV-2, FLU A/B, AND RSV BY PCR (2-4 HOURS CEPHEID): Collect NP swab in VTM    Collection Time: 01/19/21  3:19 PM    Specimen: Respirate   Result Value Ref Range    Influenza virus A RNA Negative Negative    Influenza virus B, PCR Negative Negative    RSV, PCR Negative Negative    SARS-CoV-2 by PCR NotDetected     SARS-CoV-2  Source NP Swab        Labs reviewed by me.     Records reviewed:   Extensively reviewed patient's record/previous encounters at [facility].       Imaging reviewed by me:     CT-TSPINE W/O PLUS RECONS   Final Result      No fracture or subluxation is seen in the thoracic spine.      Multilevel Schmorl's nodes and degenerative changes.         CT-LSPINE W/O PLUS RECONS   Final Result      1.  No acute fracture or listhesis in the lumbar spine.   2.  Sequela of L4-5 anterior fusion, with no evidence of hardware complication.   3.  Nonobstructing right nephrolithiasis.   4.  A 3.5 cm right adnexal cyst.      CT-HEAD W/O   Final Result      No acute intracranial abnormality is identified.      CT-CSPINE WITHOUT PLUS RECONS   Final Result      No acute fracture or listhesis in the cervical spine.      DX-MYELOGRAPHY VIA LUMBAR INJ LUMBOSACRAL    (Results Pending)   CT-CSPINE WITH PLUS RECONS    (Results Pending)   CT-LSPINE WITH PLUS RECONS    (Results Pending)   CT-TSPINE WITH PLUS RECONS    (Results Pending)         Modified [place] Scale (MRS): 2 = Slight disability; unable to perform all previous activities but able to look after own affairs without assistance      ASSESSMENT AND PLAN:  30-year old female; well known to our outpatient and inpatient neurology services, with PMhx CRION syndrome, transverse myelitis, disabled at baseline however ambulatory, bladder stimulator implanted, idiopathic intracranial hypertension without papilledema, migraine, and suspected underlying psychiatric component of the above who presented to [facility] On [facility]/21 for a chief complaint of fall and subsequent BLE weakness; on arrival here, CT head, C, T and L spine all unremarkable. Despite this, patient's BLE weakness/paralysis persists. Patient unable to undergo MRI secondary to implanted bladder stimulator. Neurological exam is significant for areflexia to BLE-- per review of outpatient neurology notes, patient had reflexes to  BUE/BLE during a previous visit on 6/30/20.   Though suspect at least partial functional (psychogenic) component to exam, given patient's body habitus and her history, cannot exclude syndrome such as Cauda Equina or other evolving trauma, acute myelopathy or central process (patietn complains of pain to thoracic area). Will plan for urgent CT Myelogram, to be performed tomorrow morning, 1/20/21.     Additional Recommendations/Plan:     -q4h and PRN neuro assessment. VS per nursing/unit protocol.   -PT/OT eval and treat. Physiatry consultation.  -Check UDS, ETOH. Note UA WNL.   -Will follow up with results of myelography and make additional recs accordingly. All other medical management (including conservative pain management) per primary team.    -DVT PPX: SCDs.     The plan of care above has been discussed with Dr. Rincon.     JERED MaynardRDIANE.  [facility]

## 2021-01-20 ENCOUNTER — APPOINTMENT (OUTPATIENT)
Dept: RADIOLOGY | Facility: MEDICAL CENTER | Age: 31
DRG: 098 | End: 2021-01-20
Attending: PSYCHIATRY & NEUROLOGY
Payer: MEDICARE

## 2021-01-20 ENCOUNTER — HOSPITAL ENCOUNTER (OUTPATIENT)
Dept: RADIOLOGY | Facility: MEDICAL CENTER | Age: 31
End: 2021-01-20
Attending: PSYCHIATRY & NEUROLOGY
Payer: MEDICARE

## 2021-01-20 DIAGNOSIS — G63 POLYNEUROPATHY ASSOCIATED WITH UNDERLYING DISEASE (HCC): Chronic | ICD-10-CM

## 2021-01-20 DIAGNOSIS — G37.3 TRANSVERSE MYELITIS (HCC): Primary | ICD-10-CM

## 2021-01-20 LAB
ALBUMIN SERPL BCP-MCNC: 3.8 G/DL (ref 3.2–4.9)
ALBUMIN/GLOB SERPL: 2.7 G/DL
ALP SERPL-CCNC: 78 U/L (ref 30–99)
ALT SERPL-CCNC: 30 U/L (ref 2–50)
ANION GAP SERPL CALC-SCNC: 13 MMOL/L (ref 7–16)
APTT PPP: 28.5 SEC (ref 24.7–36)
AST SERPL-CCNC: 56 U/L (ref 12–45)
BACTERIA BLD CULT: NORMAL
BACTERIA BLD CULT: NORMAL
BASOPHILS # BLD AUTO: 0.8 % (ref 0–1.8)
BASOPHILS # BLD: 0.03 K/UL (ref 0–0.12)
BILIRUB SERPL-MCNC: 0.6 MG/DL (ref 0.1–1.5)
BUN SERPL-MCNC: 6 MG/DL (ref 8–22)
BURR CELLS/RBC NFR CSF MANUAL: 1 %
CALCIUM SERPL-MCNC: 8.6 MG/DL (ref 8.5–10.5)
CHLORIDE SERPL-SCNC: 110 MMOL/L (ref 96–112)
CHOLEST SERPL-MCNC: 172 MG/DL (ref 100–199)
CLARITY CSF: CLEAR
CO2 SERPL-SCNC: 16 MMOL/L (ref 20–33)
COLOR CSF: COLORLESS
CREAT SERPL-MCNC: 0.59 MG/DL (ref 0.5–1.4)
CYTOLOGY REG CYTOL: NORMAL
EOSINOPHIL # BLD AUTO: 0.08 K/UL (ref 0–0.51)
EOSINOPHIL NFR BLD: 2.2 % (ref 0–6.9)
ERYTHROCYTE [DISTWIDTH] IN BLOOD BY AUTOMATED COUNT: 47.2 FL (ref 35.9–50)
ERYTHROCYTE [SEDIMENTATION RATE] IN BLOOD BY WESTERGREN METHOD: 6 MM/HOUR (ref 0–20)
EST. AVERAGE GLUCOSE BLD GHB EST-MCNC: 85 MG/DL
GLOBULIN SER CALC-MCNC: 1.4 G/DL (ref 1.9–3.5)
GLUCOSE BLD-MCNC: 105 MG/DL (ref 65–99)
GLUCOSE BLD-MCNC: 109 MG/DL (ref 65–99)
GLUCOSE BLD-MCNC: 99 MG/DL (ref 65–99)
GLUCOSE BLD-MCNC: 99 MG/DL (ref 65–99)
GLUCOSE CSF-MCNC: 67 MG/DL (ref 40–80)
GLUCOSE SERPL-MCNC: 99 MG/DL (ref 65–99)
HBA1C MFR BLD: 4.6 % (ref 0–5.6)
HCT VFR BLD AUTO: 39 % (ref 37–47)
HDLC SERPL-MCNC: 48 MG/DL
HGB BLD-MCNC: 12.6 G/DL (ref 12–16)
HIV 1+2 AB+HIV1 P24 AG SERPL QL IA: NORMAL
IMM GRANULOCYTES # BLD AUTO: 0.01 K/UL (ref 0–0.11)
IMM GRANULOCYTES NFR BLD AUTO: 0.3 % (ref 0–0.9)
INR PPP: 1.05 (ref 0.87–1.13)
LDLC SERPL CALC-MCNC: 98 MG/DL
LYMPHOCYTES # BLD AUTO: 1 K/UL (ref 1–4.8)
LYMPHOCYTES NFR BLD: 26.9 % (ref 22–41)
LYMPHOCYTES NFR CSF: 63 %
MCH RBC QN AUTO: 29.6 PG (ref 27–33)
MCHC RBC AUTO-ENTMCNC: 32.3 G/DL (ref 33.6–35)
MCV RBC AUTO: 91.5 FL (ref 81.4–97.8)
MONOCYTES # BLD AUTO: 0.3 K/UL (ref 0–0.85)
MONOCYTES NFR BLD AUTO: 8.1 % (ref 0–13.4)
MONONUC CELLS NFR CSF: 31 %
NEUTROPHILS # BLD AUTO: 2.3 K/UL (ref 2–7.15)
NEUTROPHILS NFR BLD: 61.7 % (ref 44–72)
NEUTROPHILS NFR CSF: 6 %
NRBC # BLD AUTO: 0 K/UL
NRBC BLD-RTO: 0 /100 WBC
PLATELET # BLD AUTO: 148 K/UL (ref 164–446)
PMV BLD AUTO: 11.9 FL (ref 9–12.9)
POTASSIUM SERPL-SCNC: 3.3 MMOL/L (ref 3.6–5.5)
PROT CSF-MCNC: 26 MG/DL (ref 15–45)
PROT SERPL-MCNC: 5.2 G/DL (ref 6–8.2)
PROTHROMBIN TIME: 14 SEC (ref 12–14.6)
RBC # BLD AUTO: 4.26 M/UL (ref 4.2–5.4)
RBC # CSF: 1163 CELLS/UL
SIGNIFICANT IND 70042: NORMAL
SIGNIFICANT IND 70042: NORMAL
SITE SITE: NORMAL
SITE SITE: NORMAL
SODIUM SERPL-SCNC: 139 MMOL/L (ref 135–145)
SOURCE SOURCE: NORMAL
SOURCE SOURCE: NORMAL
SPECIMEN VOL CSF: 19 ML
TREPONEMA PALLIDUM IGG+IGM AB [PRESENCE] IN SERUM OR PLASMA BY IMMUNOASSAY: NORMAL
TRIGL SERPL-MCNC: 130 MG/DL (ref 0–149)
TSH SERPL DL<=0.005 MIU/L-ACNC: 1.24 UIU/ML (ref 0.38–5.33)
TUBE # CSF: 3
TUBE # CSF: 3
WBC # BLD AUTO: 3.7 K/UL (ref 4.8–10.8)
WBC # CSF: 1 CELLS/UL (ref 0–10)

## 2021-01-20 PROCEDURE — 87389 HIV-1 AG W/HIV-1&-2 AB AG IA: CPT

## 2021-01-20 PROCEDURE — 86038 ANTINUCLEAR ANTIBODIES: CPT

## 2021-01-20 PROCEDURE — 86334 IMMUNOFIX E-PHORESIS SERUM: CPT

## 2021-01-20 PROCEDURE — 83655 ASSAY OF LEAD: CPT

## 2021-01-20 PROCEDURE — 72132 CT LUMBAR SPINE W/DYE: CPT

## 2021-01-20 PROCEDURE — 80061 LIPID PANEL: CPT

## 2021-01-20 PROCEDURE — 83825 ASSAY OF MERCURY: CPT

## 2021-01-20 PROCEDURE — 72129 CT CHEST SPINE W/DYE: CPT

## 2021-01-20 PROCEDURE — 84165 PROTEIN E-PHORESIS SERUM: CPT

## 2021-01-20 PROCEDURE — 89051 BODY FLUID CELL COUNT: CPT

## 2021-01-20 PROCEDURE — 83036 HEMOGLOBIN GLYCOSYLATED A1C: CPT

## 2021-01-20 PROCEDURE — 84443 ASSAY THYROID STIM HORMONE: CPT

## 2021-01-20 PROCEDURE — 82175 ASSAY OF ARSENIC: CPT

## 2021-01-20 PROCEDURE — 82784 ASSAY IGA/IGD/IGG/IGM EACH: CPT

## 2021-01-20 PROCEDURE — 99232 SBSQ HOSP IP/OBS MODERATE 35: CPT | Performed by: INTERNAL MEDICINE

## 2021-01-20 PROCEDURE — 82962 GLUCOSE BLOOD TEST: CPT | Mod: 91

## 2021-01-20 PROCEDURE — 82300 ASSAY OF CADMIUM: CPT

## 2021-01-20 PROCEDURE — 85610 PROTHROMBIN TIME: CPT

## 2021-01-20 PROCEDURE — 36415 COLL VENOUS BLD VENIPUNCTURE: CPT

## 2021-01-20 PROCEDURE — 86780 TREPONEMA PALLIDUM: CPT

## 2021-01-20 PROCEDURE — 700102 HCHG RX REV CODE 250 W/ 637 OVERRIDE(OP): Performed by: HOSPITALIST

## 2021-01-20 PROCEDURE — 86255 FLUORESCENT ANTIBODY SCREEN: CPT

## 2021-01-20 PROCEDURE — 87529 HSV DNA AMP PROBE: CPT

## 2021-01-20 PROCEDURE — 80053 COMPREHEN METABOLIC PANEL: CPT

## 2021-01-20 PROCEDURE — 770020 HCHG ROOM/CARE - TELE (206)

## 2021-01-20 PROCEDURE — 99231 SBSQ HOSP IP/OBS SF/LOW 25: CPT | Performed by: NURSE PRACTITIONER

## 2021-01-20 PROCEDURE — 009U3ZX DRAINAGE OF SPINAL CANAL, PERCUTANEOUS APPROACH, DIAGNOSTIC: ICD-10-PCS | Performed by: RADIOLOGY

## 2021-01-20 PROCEDURE — 62284 INJECTION FOR MYELOGRAM: CPT

## 2021-01-20 PROCEDURE — 72126 CT NECK SPINE W/DYE: CPT

## 2021-01-20 PROCEDURE — 99222 1ST HOSP IP/OBS MODERATE 55: CPT | Performed by: PHYSICAL MEDICINE & REHABILITATION

## 2021-01-20 PROCEDURE — 87798 DETECT AGENT NOS DNA AMP: CPT

## 2021-01-20 PROCEDURE — 97161 PT EVAL LOW COMPLEX 20 MIN: CPT

## 2021-01-20 PROCEDURE — 700102 HCHG RX REV CODE 250 W/ 637 OVERRIDE(OP): Performed by: INTERNAL MEDICINE

## 2021-01-20 PROCEDURE — 84155 ASSAY OF PROTEIN SERUM: CPT

## 2021-01-20 PROCEDURE — 85652 RBC SED RATE AUTOMATED: CPT

## 2021-01-20 PROCEDURE — 700117 HCHG RX CONTRAST REV CODE 255: Performed by: PSYCHIATRY & NEUROLOGY

## 2021-01-20 PROCEDURE — 96376 TX/PRO/DX INJ SAME DRUG ADON: CPT

## 2021-01-20 PROCEDURE — 700111 HCHG RX REV CODE 636 W/ 250 OVERRIDE (IP): Performed by: HOSPITALIST

## 2021-01-20 PROCEDURE — 85730 THROMBOPLASTIN TIME PARTIAL: CPT

## 2021-01-20 PROCEDURE — 85025 COMPLETE CBC W/AUTO DIFF WBC: CPT

## 2021-01-20 PROCEDURE — A9270 NON-COVERED ITEM OR SERVICE: HCPCS | Performed by: INTERNAL MEDICINE

## 2021-01-20 PROCEDURE — A9270 NON-COVERED ITEM OR SERVICE: HCPCS | Performed by: HOSPITALIST

## 2021-01-20 RX ORDER — DIPHENHYDRAMINE HCL 25 MG
25 TABLET ORAL EVERY 6 HOURS PRN
Status: DISCONTINUED | OUTPATIENT
Start: 2021-01-20 | End: 2021-01-22 | Stop reason: HOSPADM

## 2021-01-20 RX ADMIN — OXYCODONE 5 MG: 5 TABLET ORAL at 23:39

## 2021-01-20 RX ADMIN — OMEPRAZOLE 40 MG: 20 CAPSULE, DELAYED RELEASE ORAL at 06:32

## 2021-01-20 RX ADMIN — OXYCODONE 5 MG: 5 TABLET ORAL at 12:36

## 2021-01-20 RX ADMIN — BUSPIRONE HYDROCHLORIDE 10 MG: 10 TABLET ORAL at 17:15

## 2021-01-20 RX ADMIN — Medication 10 MG: at 21:48

## 2021-01-20 RX ADMIN — TOPIRAMATE 50 MG: 25 TABLET, FILM COATED ORAL at 17:13

## 2021-01-20 RX ADMIN — OXYCODONE 5 MG: 5 TABLET ORAL at 20:04

## 2021-01-20 RX ADMIN — SODIUM BICARBONATE 650 MG: 650 TABLET ORAL at 17:14

## 2021-01-20 RX ADMIN — MORPHINE SULFATE 2 MG: 4 INJECTION INTRAVENOUS at 14:02

## 2021-01-20 RX ADMIN — MYCOPHENOLATE MOFETIL 1000 MG: 250 CAPSULE ORAL at 06:07

## 2021-01-20 RX ADMIN — ZIPRASIDONE HYDROCHLORIDE 40 MG: 40 CAPSULE ORAL at 17:59

## 2021-01-20 RX ADMIN — ACETAZOLAMIDE 500 MG: 500 CAPSULE, EXTENDED RELEASE ORAL at 06:06

## 2021-01-20 RX ADMIN — MYCOPHENOLATE MOFETIL 1000 MG: 250 CAPSULE ORAL at 17:14

## 2021-01-20 RX ADMIN — POTASSIUM CHLORIDE 40 MEQ: 1500 TABLET, EXTENDED RELEASE ORAL at 06:08

## 2021-01-20 RX ADMIN — ONDANSETRON 4 MG: 4 TABLET, ORALLY DISINTEGRATING ORAL at 17:59

## 2021-01-20 RX ADMIN — ZIPRASIDONE HYDROCHLORIDE 40 MG: 40 CAPSULE ORAL at 06:10

## 2021-01-20 RX ADMIN — OXCARBAZEPINE 300 MG: 300 TABLET, FILM COATED ORAL at 06:10

## 2021-01-20 RX ADMIN — METHOCARBAMOL 750 MG: 750 TABLET ORAL at 21:48

## 2021-01-20 RX ADMIN — BUSPIRONE HYDROCHLORIDE 10 MG: 10 TABLET ORAL at 06:08

## 2021-01-20 RX ADMIN — DIPHENHYDRAMINE HYDROCHLORIDE 25 MG: 25 TABLET ORAL at 15:42

## 2021-01-20 RX ADMIN — MORPHINE SULFATE 2 MG: 4 INJECTION INTRAVENOUS at 08:29

## 2021-01-20 RX ADMIN — BUSPIRONE HYDROCHLORIDE 10 MG: 10 TABLET ORAL at 12:36

## 2021-01-20 RX ADMIN — IVABRADINE 7.5 MG: 7.5 TABLET, FILM COATED ORAL at 17:15

## 2021-01-20 RX ADMIN — TRAZODONE HYDROCHLORIDE 100 MG: 100 TABLET ORAL at 21:48

## 2021-01-20 RX ADMIN — ACETAZOLAMIDE 500 MG: 500 CAPSULE, EXTENDED RELEASE ORAL at 17:13

## 2021-01-20 RX ADMIN — OXYCODONE 5 MG: 5 TABLET ORAL at 06:32

## 2021-01-20 RX ADMIN — LEVOTHYROXINE SODIUM 100 MCG: 0.1 TABLET ORAL at 06:07

## 2021-01-20 RX ADMIN — POTASSIUM CHLORIDE 40 MEQ: 1500 TABLET, EXTENDED RELEASE ORAL at 17:14

## 2021-01-20 RX ADMIN — DOCUSATE SODIUM 50 MG AND SENNOSIDES 8.6 MG 2 TABLET: 8.6; 5 TABLET, FILM COATED ORAL at 06:08

## 2021-01-20 RX ADMIN — MORPHINE SULFATE 2 MG: 4 INJECTION INTRAVENOUS at 02:50

## 2021-01-20 RX ADMIN — TOPIRAMATE 50 MG: 25 TABLET, FILM COATED ORAL at 06:08

## 2021-01-20 RX ADMIN — DOCUSATE SODIUM 50 MG AND SENNOSIDES 8.6 MG 2 TABLET: 8.6; 5 TABLET, FILM COATED ORAL at 17:14

## 2021-01-20 RX ADMIN — OXYCODONE 5 MG: 5 TABLET ORAL at 01:18

## 2021-01-20 RX ADMIN — FLUOXETINE 40 MG: 20 CAPSULE ORAL at 06:06

## 2021-01-20 RX ADMIN — OXCARBAZEPINE 300 MG: 300 TABLET, FILM COATED ORAL at 17:13

## 2021-01-20 RX ADMIN — SODIUM BICARBONATE 650 MG: 650 TABLET ORAL at 06:08

## 2021-01-20 RX ADMIN — IOHEXOL 30 ML: 300 INJECTION, SOLUTION INTRAVENOUS at 12:00

## 2021-01-20 ASSESSMENT — PAIN DESCRIPTION - PAIN TYPE
TYPE: ACUTE PAIN
TYPE: ACUTE PAIN;CHRONIC PAIN

## 2021-01-20 ASSESSMENT — ENCOUNTER SYMPTOMS
HEADACHES: 1
WEAKNESS: 1
FEVER: 0
BACK PAIN: 1
TINGLING: 1
SHORTNESS OF BREATH: 0
NERVOUS/ANXIOUS: 0
PALPITATIONS: 0
ABDOMINAL PAIN: 0
CONSTIPATION: 0
BLURRED VISION: 0
VOMITING: 0
NAUSEA: 0
DEPRESSION: 0
COUGH: 0
FALLS: 1
DIZZINESS: 0
SORE THROAT: 0
MYALGIAS: 1
CHILLS: 0
DIARRHEA: 0

## 2021-01-20 ASSESSMENT — FIBROSIS 4 INDEX: FIB4 SCORE: 2.14

## 2021-01-20 ASSESSMENT — GAIT ASSESSMENTS: GAIT LEVEL OF ASSIST: UNABLE TO PARTICIPATE

## 2021-01-20 NOTE — ED NOTES
Transport canceled as pt is neuro with cardiac monitoring. Floor called, reports they will come get pt.

## 2021-01-20 NOTE — THERAPY
Physical Therapy   Initial Evaluation     Patient Name: Kristin Balderrama  Age:  30 y.o., Sex:  female  Medical Record #: [id number]  Today's Date: 1/20/2021     Precautions: Fall Risk    Assessment  Patient is 30 y.o. female with a diagnosis of bilateral weakness.  Patient inconsistent historian, stating she was completely independent prior to hospitalization with walking, stair ambulation.  Per chart review patient able to perform bed mobility and transfers at supervision level.  During the evaluation, patient was supervision with bed mobility, however states she cannot move her legs.  Patient able to use UE to move LE (which has been a method of bed mobility she has used in the past).  Patient maintain sitting with legs hanging in the air with no LOB, and good overall balance; demonstrating good abdominal, lumbar, gluteus rosa isela and hip flexor strength.  Patient refusing slide board transfer at this time due to weakness.  However, patient able to use her hands to scoot in bed at supervision level and demonstrate UE strength and postural control to perform slide board transfer.  Patient able to describe how to perform slide board transfer and how to set up slide board transfer as this is one of her methods of transfers. Patient known to physical therapy staff with history of behavioral presentation, and physical therapy will not impact her functional levels in this setting.  Physical therapy at home may be beneficial within a multidisciplinary approach for potential psychogenic presentation.      01/20/21 1300   Precautions   Precautions Fall Risk   Comments history of psychogenic/conversion disorder   Prior Living Situation   Prior Services None   Housing / Facility 1 Story House   Steps Into Home 0   Steps In Home 0   Equipment Owned Slide Board;Front-Wheel Walker   Lives with - Patient's Self Care Capacity   (grandparents)   Prior Level of Functional Mobility   Bed Mobility Independent   Transfer Status  Independent   Ambulation Dependent   Distance Ambulation (Feet) 0   Wheelchair Dependent   Stairs Dependent   Comments Patient reports being completely I with all tasks, hoewver per chart review   History of Falls   History of Falls Yes   Date of Last Fall   (this episode, patient reports falling 4 feet OOB)   Cognition    Level of Consciousness Alert   Passive ROM Lower Body   Passive ROM Lower Body WDL   Active ROM Lower Body    Active ROM Lower Body  X   Comments patient reports inability to move legs, however muscle activation inconsistent throughout assessment   Strength Lower Body   Lower Body Strength  X   Comments upon testing B 2/5 strength, however demonstrates inconsistent strength throughout evaluation   Sensation Lower Body   Lower Extremity Sensation   X   Comments reports very faint feeling throughout BLE   Neurological Concerns   Neurological Concerns   (negative babinsi, alejandro, and inverted supinator)   Coordination Lower Body    Coordination Lower Body    (unable to test)   Balance Assessment   Sitting Balance (Static) Good   Sitting Balance (Dynamic) Good   Comments refused attempt to slideboard   Gait Analysis   Gait Level Of Assist Unable to Participate  (patient reports)   Bed Mobility    Supine to Sit Supervised   Sit to Supine Supervised   Scooting Supervised   Functional Mobility   Sit to Stand Unable to Participate  (patient reports)   Education Group   Education Provided Role of Physical Therapist   Role of Physical Therapist Patient Response Patient;Acceptance;Explanation;Demonstration;Verbal Demonstration;Action Demonstration   Anticipated Discharge Equipment and Recommendations   DC Equipment Recommendations None     Plan    Recommend Physical Therapy for Evaluation only     DC Equipment Recommendations: None  Discharge Recommendations: Other - Return to prior living situation with family assist.  If family unable or unwilling, recommend LTC.  Home health physical therapy if able.

## 2021-01-20 NOTE — PROGRESS NOTES
For 1/20 in AM Per Radiologist and Hospitalist Dr Rincon. CSF orders are in. coags ordered as well./AC

## 2021-01-20 NOTE — DISCHARGE PLANNING
[facility] Transitional Care Coordination     Referral from:  Dr. Velásquez  Facesheet indicates:  Medicare/Medicaid FFS  Potential Rehab Diagnosis:   Trrauma/Other Neuro    Chart review indicates patient has on going medical management, anticipate therapy needs to possibly meet inpatient rehab facility criteria with the goal of returning to community.    D/C support: Grandmother/Aunt     Physiatry to consult.        Last Covid test date:  1/19/21 - COVID negative      Thank you for the referral.

## 2021-01-20 NOTE — THERAPY
OT order received. Holding evaluation due to planned CT myelogram and CSF testing. Awaiting POC. Will attempt as appropriate.     Lexy Reno, OTS

## 2021-01-20 NOTE — PROGRESS NOTES
1025- transport here to take pt to CT, consent signed by pt. Pt able to assist with transfer to stretcher with 2 assist. Pt tolerated fair.       1216- returned from CT, no s/s of distress. Pt reports pain in back 9/10, pain meds given.

## 2021-01-20 NOTE — PROGRESS NOTES
Monitor summary: sinus rhythm rate 79-86, MA .16, QRS .08, QT .40, with rare PVCs per strip from monitor room.

## 2021-01-20 NOTE — OR SURGEON
Immediate Post- Operative Note    PostOp Diagnosis: L4/5 fusion    Procedure(s): LP at 3/4 w/ 11 mL Omni 300 intrathecal contrast for myelogram    Estimated Blood Loss: Less than 5 ml    Complications: None    1/20/2021     3:03 PM     Ben Patricia M.D.

## 2021-01-20 NOTE — H&P
"Hospital Medicine History & Physical Note    Date of Service  1/19/2021    Primary Care Physician  Sue Preston M.D.    Consultants  Neurology    Code Status  Full Code    Chief Complaint  Chief Complaint   Patient presents with   • T-5000 FALL   • Head Injury       History of Presenting Illness  30 y.o. female who presented 1/19/2021 with past medical history of transverse mellitus, proximal A. fib status post ablation 2016, chronic urinary retention, repeated UTIs with history of ESBL, sleep apnea, diabetes, asthma who presents to the emergency room after falling from her bed today.  Patient states that she was rolling out of bed that was 4 feet high and landed on her face and hyperextending her legs.  Patient states that she was able to move both lower extremity, walk properly without any assistance before her fall.  Afterwards she was unable to move them or have sensation.  Patient states that she can feel dull sensation but cannot feel light, sharp or temperature.  She denies any fever, shortness of breath, nausea, vomiting.  She does have a suprapubic catheter that is chronic.  She also states she is has chronic diarrhea but has notices dark stools recently.  She is scheduled to undergo EGD and colonoscopy this month.  Patient normally has a GI soft diet due to gastroparesis.  8 years prior patient was diagnosed with transverse myelitis after a positive MRI.  She did had a back stimulator placed that is MRI incompatible.  She does have a history of optic neuritis on CellCept.  Patient states that her neuro-ophthalmologist prescribed her Diamox for \"fluid in her brain.\"    Review of Systems  Review of Systems   Constitutional: Negative for chills, diaphoresis, fever and malaise/fatigue.   HENT: Negative for congestion, ear discharge, ear pain, hearing loss, nosebleeds, sinus pain, sore throat and tinnitus.    Eyes: Negative for blurred vision, double vision, photophobia and pain.   Respiratory: Negative " for cough, hemoptysis, sputum production, shortness of breath, wheezing and stridor.    Cardiovascular: Negative for chest pain, palpitations, orthopnea, claudication, leg swelling and PND.   Gastrointestinal: Negative for abdominal pain, blood in stool, constipation, diarrhea, heartburn, melena, nausea and vomiting.   Genitourinary: Negative for dysuria, flank pain, frequency, hematuria and urgency.   Musculoskeletal: Positive for back pain. Negative for falls, joint pain, myalgias and neck pain.   Skin: Negative for itching and rash.   Neurological: Positive for tingling, sensory change, focal weakness and weakness. Negative for dizziness, tremors and headaches.   Endo/Heme/Allergies: Negative for environmental allergies and polydipsia. Does not bruise/bleed easily.   Psychiatric/Behavioral: Negative for depression, hallucinations, substance abuse and suicidal ideas.       Past Medical History   has a past medical history of Abdominal pain, Anginal syndrome, Apnea, sleep, Arrhythmia, Arthritis, ASTHMA, Atrial fibrillation ([facility]), Back pain, Borderline personality disorder ([facility]), Breath shortness, Bronchitis, Cardiac arrhythmia, Chickenpox, Chronic obstructive pulmonary disease ([facility]), Chronic UTI (9/18/2010), Cough, Daytime sleepiness, Depression, Diabetes ([facility]), Diarrhea, Disorder of thyroid, Fall, Fatigue, Frequent headaches, Gasping for breath, Gynecological disorder, Headache(784.0), Heart burn, History of falling, Indigestion, Migraine, 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[facility]), 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    Surgical History   has a past  "surgical history that includes neuro dest facet l/s w/ig sngl (4/21/2015); recovery (1/27/2016); delmar by laparoscopy (8/29/2010); lumbar fusion anterior (8/21/2012); other cardiac surgery (1/2016); tonsillectomy; bowel stimulator insertion (7/13/2016); gastroscopy with balloon dilatation (N/A, 1/4/2017); muscle biopsy (Right, 1/26/2017); other abdominal surgery; and laminotomy.     Family History  family history includes Genitourinary () Problems in her sister; Heart Disease in her maternal grandmother and mother; Hypertension in her maternal grandmother, maternal uncle, and mother; No Known Problems in her sister; Other in her mother and sister; Sleep Apnea in her mother.     Social History   reports that she has never smoked. She has never used smokeless tobacco. She reports previous drug use. Frequency: 7.00 times per week. Drug: Marijuana. She reports that she does not drink alcohol.    Allergies  Allergies   Allergen Reactions   • Depakote [Divalproex Sodium] Unspecified     Muscle spasms/muscle pain and weakness     • Amitriptyline Unspecified     Headaches     • Aripiprazole [Abilify] Unspecified     Headaches/muscle twitching     • Clindamycin Nausea     Even with food     • Flagyl [Metronidazole Hcl] Unspecified     \"eye problems\"     • Flomax [Tamsulosin Hydrochloride] Swelling   • Levaquin Unspecified     Severe muscle cramps in legs  RXN=unknown   • Metformin Unspecified     Increased lactic acid      • Tape Rash     Tears skin off  coban with Tegaderm tape ok intermittently  RXN=ongoing   • Vancomycin Itching     Pt becomes flushed in face and chest.   RXN=7/10/16   • Wound Dressing Adhesive Hives     By pt report   • Ampicillin Rash     Pt reports that she received a rash    • Ciprofloxacin    • Keflex Rash     Pt states she gets a rash with this medication  Tolerates ceftriaxone   • Levofloxacin Unspecified     Leg muscle cramps   • Metronidazole Rash     .   • Penicillins Hives   • Sulfa Drugs " Myalgia     Muscle pain and weakness   • Tamsulosin Swelling   • Valproic Acid Rash     .       Medications  Prior to Admission Medications   Prescriptions Last Dose Informant Patient Reported? Taking?   Dulaglutide (TRULICITY) 3 MG/0.5ML Solution Pen-injector 1/15/2021 at [facility] Patient No No   Sig: Inject 3 mg under the skin every 7 days.   Patient taking differently: Inject 3 mg under the skin every Friday.   Melatonin 10 MG Tab 1/18/2021 at PM Patient Yes No   Sig: Take 10 mg by mouth every bedtime.   Mirabegron ER (MYRBETRIQ) 50 MG TABLET SR 24 HR 1/19/2021 at 0900 Patient No No   Sig: Take 50 mg by mouth every day.   NS SOLN 100 mL with meropenem 500 MG SOLR 1,000 mg NOT TAKING at NOT TAKING Patient No No   Sig: Infuse 1,000 mg into a venous catheter every 6 hours.   Patient not taking: Reported on 1/11/2021   OXcarbazepine (TRILEPTAL) 300 MG Tab 1/19/2021 at 0900 Patient No No   Sig: Take 1 Tab by mouth 2 times a day.   acetaZOLAMIDE SR (DIAMOX) 500 MG CAPSULE SR 12 HR 1/19/2021 at 0900 Patient Yes No   Sig: Take 500 mg by mouth 2 times a day.   acetaminophen (TYLENOL) 500 MG Tab 1/18/2021 at PM Patient Yes No   Sig: Take 1,000 mg by mouth every 6 hours as needed for Moderate Pain.   albuterol 108 (90 Base) MCG/ACT Aero Soln inhalation aerosol ABOUT 1 WEEK AGO at [facility] Patient No No   Sig: Inhale 2 Puffs every 6 hours as needed for Shortness of Breath.   aspirin EC (ECOTRIN) 81 MG Tablet Delayed Response 1/19/2021 at 0900 Patient Yes No   Sig: Take 81 mg by mouth every day.   busPIRone (BUSPAR) 10 MG Tab tablet 1/19/2021 at 0900 Patient Yes No   Sig: Take 10 mg by mouth 3 times a day.   busPIRone (BUSPAR) 15 MG tablet NOT TAKING at NOT TAKING Patient No No   Sig: Take 1 tablet by mouth 3 times a day   Patient not taking: Reported on 1/11/2021   calcium carbonate (TUMS EX) 750 MG chewable tablet ABOUT 1 WEEK AGO at [facility] Patient No No   Sig: Take 2 Tabs by mouth every day.   Patient taking differently: Chew 1,500 mg  1 time a day as needed.   esomeprazole magnesium (NEXIUM) 40 MG Pack 1/19/2021 at 0900 Patient Yes Yes   Sig: Take 40 mg by mouth every morning before breakfast.   fluoxetine (PROZAC) 40 MG capsule 1/19/2021 at 0900 Patient No No   Sig: Take 1 Cap by mouth every day.   insulin glargine (LANTUS) 100 UNIT/ML Solution NOT TAKING at NOT TAKING Patient No No   Sig: Inject 8 Units as instructed every evening.   Patient not taking: Reported on 1/11/2021   ipratropium-albuterol (DUONEB) 0.5-2.5 (3) MG/3ML nebulizer solution ABOUT 1 WEEK AGO at [facility] Patient Yes No   Sig: Take 3 mL by nebulization every four hours as needed for Shortness of Breath. Nebulizer    ivabradine (CORLANOR) 7.5 MG Tab tablet 1/19/2021 at 0900 Patient Yes No   Sig: Take 7.5 mg by mouth 2 times a day, with meals.   levothyroxine (SYNTHROID) 100 MCG Tab 1/19/2021 at 0900 Patient Yes No   Sig: Take 100 mcg by mouth Every morning on an empty stomach.   levothyroxine (SYNTHROID) 75 MCG Tab NOT TAKING at NOT TAKING Patient No No   Sig: Take 1 Tab by mouth Every morning on an empty stomach.   Patient not taking: Reported on 1/11/2021   methocarbamol (ROBAXIN) 750 MG Tab 1/18/2021 at PM Patient Yes No   Sig: Take 750 mg by mouth every bedtime. Indications: Musculoskeletal Pain   mycophenolate (CELLCEPT) 500 MG tablet 1/19/2021 at 0900 Patient No No   Sig: Take 2 Tabs by mouth 2 times a day.   omeprazole (PRILOSEC) 20 MG delayed-release capsule NOT TAKING at NOT TAKING Patient No No   Sig: Take 1 Cap by mouth every day.   Patient not taking: Reported on 1/11/2021   ondansetron (ZOFRAN ODT) 4 MG TABLET DISPERSIBLE 1/19/2021 at PRN Patient No No   Sig: Take 1 Tab by mouth every 6 hours as needed for Nausea.   oxybutynin (DITROPAN) 5 MG Tab 1/19/2021 at 0900 Patient No No   Sig: Take 1 Tab by mouth 3 times a day.   polyethylene glycol 3350 (MIRALAX) 17 GM/SCOOP Powder NOT TAKING at NOT TAKING Patient No No   Sig: Take 17 g by mouth 2 times a day for 30 days.    Patient not taking: Reported on 1/11/2021   potassium chloride (KLOR-CON) 20 MEQ Pack 1/19/2021 at 0900 Patient No No   Sig: Take 2 Packets by mouth 2 times a day.   sodium bicarbonate 325 MG Tab 1/19/2021 at 0900 Patient Yes No   Sig: Take 650 mg by mouth 2 times a day.   topiramate (TOPAMAX) 50 MG tablet 1/19/2021 at 0900 Patient Yes No   Sig: Take 50 mg by mouth 2 times a day.   traZODone (DESYREL) 100 MG Tab 1/18/2021 at PM Patient No No   Sig: Take 1 Tab by mouth every bedtime.   Patient taking differently: Take 100 mg by mouth every evening.   vitamin D, Ergocalciferol, (DRISDOL) 1.25 MG (54598 UT) Cap capsule UNK at UNK Patient Yes No   Sig: Take 50,000 Units by mouth every 7 days.   ziprasidone (GEODON) 40 MG Cap 1/19/2021 at 0900 Patient No No   Sig: Take 1 tablet by mouth twice a day   Patient taking differently: Take 40 mg by mouth 2 Times a Day. Take 1 tablet by mouth twice a day      Facility-Administered Medications: None       Physical Exam  Temp:  [36.4 °C (97.6 °F)] 36.4 °C (97.6 °F)  Pulse:  [60-67] 63  Resp:  [13-19] 17  BP: (108-132)/(64-89) 132/89  SpO2:  [98 %-99 %] 99 %    Physical Exam  Vitals signs and nursing note reviewed.   Constitutional:       General: She is not in acute distress.     Appearance: Normal appearance. She is not ill-appearing, toxic-appearing or diaphoretic.   HENT:      Head: Normocephalic and atraumatic.      Nose: No congestion or rhinorrhea.      Mouth/Throat:      Pharynx: No oropharyngeal exudate or posterior oropharyngeal erythema.   Eyes:      General: No scleral icterus.  Neck:      Musculoskeletal: No neck rigidity or muscular tenderness.      Vascular: No carotid bruit.   Cardiovascular:      Rate and Rhythm: Normal rate and regular rhythm.      Pulses: Normal pulses.      Heart sounds: Normal heart sounds. No murmur. No friction rub. No gallop.    Pulmonary:      Effort: Pulmonary effort is normal. No respiratory distress.      Breath sounds: Normal breath  sounds. No stridor. No wheezing or rhonchi.   Abdominal:      General: Abdomen is flat. There is no distension.      Palpations: There is no mass.      Tenderness: There is no abdominal tenderness. There is no left CVA tenderness, guarding or rebound.      Hernia: No hernia is present.   Musculoskeletal: Normal range of motion.         General: No swelling.      Right lower leg: No edema.      Left lower leg: No edema.   Lymphadenopathy:      Cervical: No cervical adenopathy.   Skin:     General: Skin is warm and dry.      Capillary Refill: Capillary refill takes more than 3 seconds.      Coloration: Skin is not jaundiced or pale.      Findings: No bruising or erythema.   Neurological:      Mental Status: She is alert.      Sensory: Sensory deficit present.      Motor: Weakness present.      Comments: Hypoactive reflexes bilaterally         Laboratory:  Recent Labs     01/19/21  1504   WBC 4.2*   RBC 4.34   HEMOGLOBIN 12.5   HEMATOCRIT 40.0   MCV 92.2   MCH 28.8   MCHC 31.3*   RDW 46.9   PLATELETCT 175   MPV 12.5     Recent Labs     01/19/21  1504   SODIUM 140   POTASSIUM 3.9   CHLORIDE 115*   CO2 15*   GLUCOSE 83   BUN 4*   CREATININE 0.62   CALCIUM 8.8     Recent Labs     01/19/21  1504   ALTSGPT 20   ASTSGOT 13   ALKPHOSPHAT 67   TBILIRUBIN 0.4   GLUCOSE 83         No results for input(s): NTPROBNP in the last 72 hours.      No results for input(s): TROPONINT in the last 72 hours.    Imaging:  CT-TSPINE W/O PLUS RECONS   Final Result      No fracture or subluxation is seen in the thoracic spine.      Multilevel Schmorl's nodes and degenerative changes.         CT-LSPINE W/O PLUS RECONS   Final Result      1.  No acute fracture or listhesis in the lumbar spine.   2.  Sequela of L4-5 anterior fusion, with no evidence of hardware complication.   3.  Nonobstructing right nephrolithiasis.   4.  A 3.5 cm right adnexal cyst.      CT-HEAD W/O   Final Result      No acute intracranial abnormality is identified.       CT-CSPINE WITHOUT PLUS RECONS   Final Result      No acute fracture or listhesis in the cervical spine.      DX-MYELOGRAPHY VIA LUMBAR INJ LUMBOSACRAL    (Results Pending)   CT-CSPINE WITH PLUS RECONS    (Results Pending)   CT-LSPINE WITH PLUS RECONS    (Results Pending)   CT-TSPINE WITH PLUS RECONS    (Results Pending)         Assessment/Plan:  I anticipate this patient is appropriate for observation status at this time.    * Weakness of both lower extremities  Assessment & Plan  Unknown etiology at this point with history of transverse myelitis, conversion disorder?  After fall and has back pain, chronic urinary retention and diarrhea  CT scan with myelogram is ordered per neurology  Blood work ordered per neurology  PT OT eval  Pain control    Diabetes mellitus type 2 in obese (HCC)- (present on admission)  Assessment & Plan  Start on insulin sliding scale with serial Accu-Checks  Check hemoglobin A1c  Hypoglycemic protocol in place        Idiopathic intracranial hypertension- (present on admission)  Assessment & Plan  On Diamox    History of supraventricular tachycardia- (present on admission)  Assessment & Plan  Ivabradine prescribed by cardiology    History of optic neuritis- (present on admission)  Assessment & Plan  Continue home CellCept    Mild intermittent asthma without complication- (present on admission)  Assessment & Plan  Not in exacerbation  Continue home inhalers    Hypothyroidism- (present on admission)  Assessment & Plan  Continue home Synthroid  Check TSH    Anxiety- (present on admission)  Assessment & Plan  Continue home medications

## 2021-01-20 NOTE — ASSESSMENT & PLAN NOTE
Lab Results   Component Value Date/Time    HBA1C 5.1 10/09/2020 1243    HBA1C 5.0 08/15/2020 0502    HBA1C 6.0 (H) 08/29/2019 1427     Results from last 7 days   Lab Units 01/21/21  1027 01/20/21  2139 01/20/21  1645 01/20/21  1224 01/20/21  0635 01/19/21  2326 01/15/21  2111   ACCU CHECK GLUCOSE 788 mg/dL 153* 99 109* 105* 99 96 86     I have ordered insulin sliding scale with D50 and glucagon for hypoglycemia per protocol.  Diabetic diet  Diabetic education

## 2021-01-20 NOTE — CONSULTS
"    Physical Medicine and Rehabilitation Consultation         Initial Consult      Initial Consultation Date: 1/20/2021  Consulting provider: Dr. Rincon  Reason for consultation: assess for acute inpatient rehab appropriateness  LOS: 0 Day(s)      Chief complaint: BLE weakness/numbness        HPI:   The patient is a 30 y.o. 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 After the fall, she was not able to move her legs and impaired sensation. CT C, T, and L spine without fracture or subluxations.     Patient seen in bed, sleeping, easily arousable. The patient currently reports burning pain in the low back, like \"gasoline\" on her back. Recalls that she was sleeping with the bed elevated unusually high by accident, so it was at 4 ft height. While rolling in bed, she fell off face first, striking her left orbital region and hyperextending of her body. She felt no sensation and had no strength distal to \"the last rib,\" so she called EMS. She reports that since then, her strength and sensation has been slowly improving. The sensation is described now as \"dull\" and can feel it. She is also able to move her legs now, although limited by pain. She feels that she needs some therapies before going home. Lives with her grandmother who is in her 80s and cannot provide physical assist. Does have some nausea. Denies feeling ill, fever, chills.      ROS:  Pertinent positives are listed in HPI, all other systems reviewed and are negative      Social Hx:  Pre-morbidly, this patient lived in:   1 story home, ramp accessible  Lives with " "grandmother, who can provide supervision but not physical assist  Disabled, not employed        Current level of function:   Therapy eval pending         PMH:  Past Medical History:   Diagnosis Date   • Abdominal pain    • Anginal syndrome     random chest pain especially with tachycardia   • Apnea, sleep    • Arrhythmia     \"sinus tachycardia\", cariologist, Dr. Kumar; ablation 2/2016   • Arthritis     osteo   • ASTHMA     when around smoke   • Atrial fibrillation (HCC)    • Back pain    • Borderline personality disorder (HCC)    • Breath shortness     with tachycardia   • Bronchitis    • Cardiac arrhythmia    • Chickenpox    • Chronic obstructive pulmonary disease (HCC)    • Chronic UTI 9/18/2010   • Cough    • Daytime sleepiness    • Depression    • Diabetes (HCC)    • Diarrhea    • Disorder of thyroid    • Fall    • Fatigue    • Frequent headaches    • Gasping for breath    • Gynecological disorder     PCOS   • Headache(784.0)    • Heart burn    • History of falling    • Indigestion    • Migraine    • 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HCC)    • 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HCC)    • Nausea    • Obesity    • Other fatigue 6/29/2020   • Pain 08-15-12    back, 7/10   • Painful joint    • PCOS (polycystic ovarian syndrome)    • Pneumonia 2012   • Psychosis (HCC)    • Ringing in ears    • Scoliosis    • Shortness of breath    • Sinus tachycardia 10/31/2013   • Sleep apnea     CPAP \"pulmonary doctor took me off mid year 2016\"   • Snoring    • Tonsillitis    • 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HCC)    • Tuberculosis     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   • Urinary bladder disorder     Suprapubic cath   • Urinary incontinence    • Weakness    • Wears glasses          PSH:  Past Surgical History:   Procedure Laterality Date   • MUSCLE BIOPSY Right 1/26/2017    Procedure: MUSCLE BIOPSY - THIGH;  Surgeon: Isidro Vigil M.D.;  Location: SURGERY [facility];  Service:    • GASTROSCOPY WITH BALLOON DILATATION N/A 1/4/2017    " Procedure: GASTROSCOPY WITH DILATATION;  Surgeon: Torres Vargas M.D.;  Location: SURGERY [facility];  Service:    • BOWEL STIMULATOR INSERTION  7/13/2016    Procedure: BOWEL STIMULATOR INSERTION FOR PERMANENT INTERSTIM SACRAL IMPLANT;  Surgeon: Joe Noyola M.D.;  Location: SURGERY [facility];  Service:    • RECOVERY  1/27/2016    Procedure: CATH LAB EP STUDY WITH SINUS NODE MODIFICATION ABHINAV;  Surgeon: [facility];  Location: SURGERY PRE-POST PROC UNIT [facility];  Service:    • OTHER CARDIAC SURGERY  1/2016    cardiac ablation   • NEURO DEST FACET L/S W/IG SNGL  4/21/2015    Performed by Reza Tabor at SURGERY [facility]   • LUMBAR FUSION ANTERIOR  8/21/2012    Performed by SUSIE SAWANT at SURGERY [facility]   • ALYSSA BY LAPAROSCOPY  8/29/2010    Performed by SHAYY JOHNS at SURGERY [facility]   • LAMINOTOMY     • OTHER ABDOMINAL SURGERY     • TONSILLECTOMY      tonsillectomy         FHX: Reviewed.  Family History   Problem Relation Age of Onset   • Hypertension Mother    • Sleep Apnea Mother    • Heart Disease Mother    • Other Mother         hypothryod   • Hypertension Maternal Uncle    • Heart Disease Maternal Grandmother    • Hypertension Maternal Grandmother    • No Known Problems Sister    • Other Sister         Narcolepsy;fibromyalsia;bone;nerve   • Genitourinary () Problems Sister         endometriosis                 Medications:  Current Facility-Administered Medications   Medication Dose   • senna-docusate (PERICOLACE or SENOKOT S) 8.6-50 MG per tablet 2 Tab  2 Tab    And   • polyethylene glycol/lytes (MIRALAX) PACKET 1 Packet  1 Packet    And   • magnesium hydroxide (MILK OF MAGNESIA) suspension 30 mL  30 mL    And   • bisacodyl (DULCOLAX) suppository 10 mg  10 mg   • enoxaparin (LOVENOX) inj 40 mg  40 mg   • acetaminophen (Tylenol) tablet 650 mg  650 mg   • Pharmacy Consult Request ...Pain Management Review 1 Each  1 Each    And   • oxyCODONE  "immediate-release (ROXICODONE) tablet 2.5 mg  2.5 mg    And   • oxyCODONE immediate-release (ROXICODONE) tablet 5 mg  5 mg    And   • morphine (pf) 4 mg/mL injection 2 mg  2 mg   • ondansetron (ZOFRAN) syringe/vial injection 4 mg  4 mg   • ondansetron (ZOFRAN ODT) dispertab 4 mg  4 mg   • promethazine (PHENERGAN) tablet 12.5-25 mg  12.5-25 mg   • promethazine (PHENERGAN) suppository 12.5-25 mg  12.5-25 mg   • prochlorperazine (COMPAZINE) injection 5-10 mg  5-10 mg   • acetaZOLAMIDE SR (DIAMOX) capsule 500 mg  500 mg   • albuterol inhaler 2 Puff  2 Puff   • aspirin EC (ECOTRIN) tablet 81 mg  81 mg   • busPIRone (BUSPAR) tablet 10 mg  10 mg   • FLUoxetine (PROZAC) capsule 40 mg  40 mg   • omeprazole (PRILOSEC) capsule 40 mg  40 mg   • ipratropium-albuterol (DUONEB) nebulizer solution  3 mL   • ivabradine (Corlanor) tablet 7.5 mg  7.5 mg   • levothyroxine (SYNTHROID) tablet 100 mcg  100 mcg   • melatonin tablet 10 mg  10 mg   • methocarbamol (ROBAXIN) tablet 750 mg  750 mg   • mycophenolate (CELLCEPT) capsule 1,000 mg  1,000 mg   • OXcarbazepine (TRILEPTAL) tablet 300 mg  300 mg   • topiramate (TOPAMAX) tablet 50 mg  50 mg   • sodium bicarbonate tablet 650 mg  650 mg   • potassium chloride SA (Kdur) tablet 40 mEq  40 mEq   • traZODone (DESYREL) tablet 100 mg  100 mg   • ziprasidone (GEODON) capsule 40 mg  40 mg   • insulin regular (HumuLIN R,NovoLIN R) injection  2-9 Units    And   • glucose 4 g chewable tablet 16 g  16 g    And   • dextrose 50% (D50W) injection 50 mL  50 mL       Allergies:  Allergies   Allergen Reactions   • Depakote [Divalproex Sodium] Unspecified     Muscle spasms/muscle pain and weakness     • Amitriptyline Unspecified     Headaches     • Aripiprazole [Abilify] Unspecified     Headaches/muscle twitching     • Clindamycin Nausea     Even with food     • Flagyl [Metronidazole Hcl] Unspecified     \"eye problems\"     • Flomax [Tamsulosin Hydrochloride] Swelling   • Levaquin Unspecified     Severe " "muscle cramps in legs  RXN=unknown   • Metformin Unspecified     Increased lactic acid      • Tape Rash     Tears skin off  coban with Tegaderm tape ok intermittently  RXN=ongoing   • Vancomycin Itching     Pt becomes flushed in face and chest.   RXN=7/10/16   • Wound Dressing Adhesive Hives     By pt report   • Ampicillin Rash     Pt reports that she received a rash    • Ciprofloxacin    • Keflex Rash     Pt states she gets a rash with this medication  Tolerates ceftriaxone   • Levofloxacin Unspecified     Leg muscle cramps   • Metronidazole Rash     .   • Penicillins Hives   • Sulfa Drugs Myalgia     Muscle pain and weakness   • Tamsulosin Swelling   • Valproic Acid Rash     .         Vitals: /50   Pulse 87   Temp 36.2 °C (97.2 °F) (Temporal)   Resp 20   Ht 1.651 m (5' 5\")   Wt 108 kg (238 lb 1.6 oz)   SpO2 93%         Physical Exam:  Gen: very pleasant  Head: subtle superficial erythema over left forehead  Eyes/ Nose/ Mouth: moist mucous membranes  Cardio: Well perfused extremities  Pulm: on room air  Abd: Soft NTND, large body habitus  Skin: warm/dry skin  Ext: No atrophy of muscles  Psych: answers questions appropriately follows commands, calm affect    Neuro:   -Speech: fluent, no aphasia or dysarthria  -Hearing and visual acuity functional and grossly intact  -Face symmetric    Motor:  -Right upper extremity: 5/5 with arm abduction, elbow flexion, elbow extension  -Left upper extremity: 5/5 with arm abduction, elbow flexion, elbow extension  -Bilateral hip flexion, knee flexion, knee extension, ankle DF with 1-2/5 strength in limited range of motion; slightly tremulous during these attempts    Sensory:   -\"dull\" sensation distal to the mid abdomen    Reflexes:  -Positive right ankle clonus, several beats  -Positive left ankle clonus, 1-2 beats  -Bilateral patella with decreased reflexes for age    Tone: no spasticity noted          Labs: Reviewed and significant for 1/20: Hgb 12.6, Na 139, K 3.3, " Cr 0.59  Recent Labs     01/19/21  1504 01/20/21  0533   RBC 4.34 4.26   HEMOGLOBIN 12.5 12.6   HEMATOCRIT 40.0 39.0   PLATELETCT 175 148*   PROTHROMBTM  --  14.0   APTT  --  28.5   INR  --  1.05     Recent Labs     01/19/21  1504 01/20/21  0533   SODIUM 140 139   POTASSIUM 3.9 3.3*   CHLORIDE 115* 110   CO2 15* 16*   GLUCOSE 83 99   BUN 4* 6*   CREATININE 0.62 0.59   CALCIUM 8.8 8.6     Recent Results (from the past 24 hour(s))   CBC WITH DIFFERENTIAL    Collection Time: 01/19/21  3:04 PM   Result Value Ref Range    WBC 4.2 (L) 4.8 - 10.8 K/uL    RBC 4.34 4.20 - 5.40 M/uL    Hemoglobin 12.5 12.0 - 16.0 g/dL    Hematocrit 40.0 37.0 - 47.0 %    MCV 92.2 81.4 - 97.8 fL    MCH 28.8 27.0 - 33.0 pg    MCHC 31.3 (L) 33.6 - 35.0 g/dL    RDW 46.9 35.9 - 50.0 fL    Platelet Count 175 164 - 446 K/uL    MPV 12.5 9.0 - 12.9 fL    Neutrophils-Polys 59.40 44.00 - 72.00 %    Lymphocytes 29.10 22.00 - 41.00 %    Monocytes 8.30 0.00 - 13.40 %    Eosinophils 2.10 0.00 - 6.90 %    Basophils 0.90 0.00 - 1.80 %    Immature Granulocytes 0.20 0.00 - 0.90 %    Nucleated RBC 0.00 /100 WBC    Neutrophils (Absolute) 2.50 2.00 - 7.15 K/uL    Lymphs (Absolute) 1.23 1.00 - 4.80 K/uL    Monos (Absolute) 0.35 0.00 - 0.85 K/uL    Eos (Absolute) 0.09 0.00 - 0.51 K/uL    Baso (Absolute) 0.04 0.00 - 0.12 K/uL    Immature Granulocytes (abs) 0.01 0.00 - 0.11 K/uL    NRBC (Absolute) 0.00 K/uL   COMP METABOLIC PANEL    Collection Time: 01/19/21  3:04 PM   Result Value Ref Range    Sodium 140 135 - 145 mmol/L    Potassium 3.9 3.6 - 5.5 mmol/L    Chloride 115 (H) 96 - 112 mmol/L    Co2 15 (L) 20 - 33 mmol/L    Anion Gap 10.0 7.0 - 16.0    Glucose 83 65 - 99 mg/dL    Bun 4 (L) 8 - 22 mg/dL    Creatinine 0.62 0.50 - 1.40 mg/dL    Calcium 8.8 8.5 - 10.5 mg/dL    AST(SGOT) 13 12 - 45 U/L    ALT(SGPT) 20 2 - 50 U/L    Alkaline Phosphatase 67 30 - 99 U/L    Total Bilirubin 0.4 0.1 - 1.5 mg/dL    Albumin 4.0 3.2 - 4.9 g/dL    Total Protein 5.6 (L) 6.0 - 8.2 g/dL     Globulin 1.6 (L) 1.9 - 3.5 g/dL    A-G Ratio 2.5 g/dL   URINALYSIS CULTURE, IF INDICATED    Collection Time: 01/19/21  3:04 PM    Specimen: Urine   Result Value Ref Range    Color Yellow     Character Clear     Specific Gravity 1.025 <1.035    Ph 6.0 5.0 - 8.0    Glucose Negative Negative mg/dL    Ketones Negative Negative mg/dL    Protein Negative Negative mg/dL    Bilirubin Negative Negative    Urobilinogen, Urine 0.2 Negative    Nitrite Negative Negative    Leukocyte Esterase Negative Negative    Occult Blood Negative Negative    Micro Urine Req see below    URINE DRUG SCREEN    Collection Time: 01/19/21  3:04 PM   Result Value Ref Range    Amphetamines Urine Negative Negative    Barbiturates Negative Negative    Benzodiazepines Negative Negative    Cocaine Metabolite Negative Negative    Methadone Negative Negative    Opiates Negative Negative    Oxycodone Negative Negative    Phencyclidine -Pcp Negative Negative    Propoxyphene Negative Negative    Cannabinoid Metab Negative Negative   ESTIMATED GFR    Collection Time: 01/19/21  3:04 PM   Result Value Ref Range    GFR If African American >60 >60 mL/min/1.73 m 2    GFR If Non African American >60 >60 mL/min/1.73 m 2   VITAMIN B12    Collection Time: 01/19/21  3:04 PM   Result Value Ref Range    Vitamin B12 -True Cobalamin 680 211 - 911 pg/mL   FOLATE    Collection Time: 01/19/21  3:04 PM   Result Value Ref Range    Folate -Folic Acid 10.8 >4.0 ng/mL   CRP QUANTITIVE (NON-CARDIAC)    Collection Time: 01/19/21  3:04 PM   Result Value Ref Range    Stat C-Reactive Protein 0.14 0.00 - 0.75 mg/dL   PROCALCITONIN    Collection Time: 01/19/21  3:04 PM   Result Value Ref Range    Procalcitonin 0.07 <0.25 ng/mL   MAGNESIUM    Collection Time: 01/19/21  3:04 PM   Result Value Ref Range    Magnesium 1.9 1.5 - 2.5 mg/dL   COV-2, FLU A/B, AND RSV BY PCR (2-4 HOURS CEPHEID): Collect NP swab in VTM    Collection Time: 01/19/21  3:19 PM    Specimen: Respirate   Result Value  Ref Range    Influenza virus A RNA Negative Negative    Influenza virus B, PCR Negative Negative    RSV, PCR Negative Negative    SARS-CoV-2 by PCR NotDetected     SARS-CoV-2 Source NP Swab    ACCU-CHEK GLUCOSE    Collection Time: 01/19/21 11:26 PM   Result Value Ref Range    Glucose - Accu-Ck 96 65 - 99 mg/dL   Sed Rate    Collection Time: 01/20/21  5:29 AM   Result Value Ref Range    Sed Rate Westergren 6 0 - 20 mm/hour   HIV AG/AB COMBO ASSAY DIAGNOSTIC    Collection Time: 01/20/21  5:29 AM   Result Value Ref Range    HIV Ag/Ab Combo Assay Non-Reactive Non Reactive   T.PALLIDUM AB EIA    Collection Time: 01/20/21  5:29 AM   Result Value Ref Range    Syphilis, Treponemal Qual Non-Reactive Non-Reactive   TSH WITH REFLEX TO FT4    Collection Time: 01/20/21  5:33 AM   Result Value Ref Range    TSH 1.240 0.380 - 5.330 uIU/mL   Prothrombin Time    Collection Time: 01/20/21  5:33 AM   Result Value Ref Range    PT 14.0 12.0 - 14.6 sec    INR 1.05 0.87 - 1.13   Lipid Profile (Lipid Panel) Fasting    Collection Time: 01/20/21  5:33 AM   Result Value Ref Range    Cholesterol,Tot 172 100 - 199 mg/dL    Triglycerides 130 0 - 149 mg/dL    HDL 48 >=40 mg/dL    LDL 98 <100 mg/dL   APTT    Collection Time: 01/20/21  5:33 AM   Result Value Ref Range    APTT 28.5 24.7 - 36.0 sec   CBC with Differential    Collection Time: 01/20/21  5:33 AM   Result Value Ref Range    WBC 3.7 (L) 4.8 - 10.8 K/uL    RBC 4.26 4.20 - 5.40 M/uL    Hemoglobin 12.6 12.0 - 16.0 g/dL    Hematocrit 39.0 37.0 - 47.0 %    MCV 91.5 81.4 - 97.8 fL    MCH 29.6 27.0 - 33.0 pg    MCHC 32.3 (L) 33.6 - 35.0 g/dL    RDW 47.2 35.9 - 50.0 fL    Platelet Count 148 (L) 164 - 446 K/uL    MPV 11.9 9.0 - 12.9 fL    Neutrophils-Polys 61.70 44.00 - 72.00 %    Lymphocytes 26.90 22.00 - 41.00 %    Monocytes 8.10 0.00 - 13.40 %    Eosinophils 2.20 0.00 - 6.90 %    Basophils 0.80 0.00 - 1.80 %    Immature Granulocytes 0.30 0.00 - 0.90 %    Nucleated RBC 0.00 /100 WBC     Neutrophils (Absolute) 2.30 2.00 - 7.15 K/uL    Lymphs (Absolute) 1.00 1.00 - 4.80 K/uL    Monos (Absolute) 0.30 0.00 - 0.85 K/uL    Eos (Absolute) 0.08 0.00 - 0.51 K/uL    Baso (Absolute) 0.03 0.00 - 0.12 K/uL    Immature Granulocytes (abs) 0.01 0.00 - 0.11 K/uL    NRBC (Absolute) 0.00 K/uL   Comp Metabolic Panel (CMP)    Collection Time: 01/20/21  5:33 AM   Result Value Ref Range    Sodium 139 135 - 145 mmol/L    Potassium 3.3 (L) 3.6 - 5.5 mmol/L    Chloride 110 96 - 112 mmol/L    Co2 16 (L) 20 - 33 mmol/L    Anion Gap 13.0 7.0 - 16.0    Glucose 99 65 - 99 mg/dL    Bun 6 (L) 8 - 22 mg/dL    Creatinine 0.59 0.50 - 1.40 mg/dL    Calcium 8.6 8.5 - 10.5 mg/dL    AST(SGOT) 56 (H) 12 - 45 U/L    ALT(SGPT) 30 2 - 50 U/L    Alkaline Phosphatase 78 30 - 99 U/L    Total Bilirubin 0.6 0.1 - 1.5 mg/dL    Albumin 3.8 3.2 - 4.9 g/dL    Total Protein 5.2 (L) 6.0 - 8.2 g/dL    Globulin 1.4 (L) 1.9 - 3.5 g/dL    A-G Ratio 2.7 g/dL   ESTIMATED GFR    Collection Time: 01/20/21  5:33 AM   Result Value Ref Range    GFR If African American >60 >60 mL/min/1.73 m 2    GFR If Non African American >60 >60 mL/min/1.73 m 2   HSV (HERPES SIMPLEX VIRUS) BY PCR (BLOOD)    Collection Time: 01/20/21  5:43 AM   Result Value Ref Range    HSV Source Serum    ACCU-CHEK GLUCOSE    Collection Time: 01/20/21  6:35 AM   Result Value Ref Range    Glucose - Accu-Ck 99 65 - 99 mg/dL   ACCU-CHEK GLUCOSE    Collection Time: 01/20/21 12:24 PM   Result Value Ref Range    Glucose - Accu-Ck 105 (H) 65 - 99 mg/dL   Cytology    Collection Time: 01/20/21 12:39 PM   Result Value Ref Range    Cytology Reg See Path Report            Imaging:  Ct-cspine Without Plus Recons  Result Date: 1/19/2021 1/19/2021 12:00 PM HISTORY/REASON FOR EXAM: Pain after trauma TECHNIQUE/EXAM DESCRIPTION: CT cervical spine without contrast, with reconstructions. The study was performed on a helical multidetector CT scanner. Thin-section helical scanning was performed from the skull base  through T1. Sagittal and coronal multiplanar reconstructions were generated from the axial images. Low dose optimization technique was utilized for this CT exam including automated exposure control and adjustment of the mA and/or kV according to patient size. COMPARISON:  None. FINDINGS: Alignment in the cervical spine is normal. There is no fracture or dislocation. The craniovertebral junction appears intact. The prevertebral and paraspinous soft tissues are unremarkable. The disc spaces are intact. There are no significant degenerative changes. The superior mediastinum and lung apices in the field of view are unremarkable.     No acute fracture or listhesis in the cervical spine.      Ct-abdomen-pelvis With  Result Date: 1/15/2021  1/15/2021 8:26 PM HISTORY/REASON FOR EXAM: Abd pain, unspecified TECHNIQUE/EXAM DESCRIPTION:  CT abdomen and pelvis with contrast. Sequential axial CT images were obtained from lung bases to the proximal femurs after the uneventful administration of 100 cc Omnipaque 350 intravenous contrast. Low dose optimization technique was utilized for this CT exam including automated exposure control and adjustment of the mA and/or kV according to patient size. COMPARISON: October 20, 2020 CT ABDOMEN FINDINGS: Limited views of the lungs appear within physiologic limits. The liver is normal in contour. Hepatomegaly is observed. No intrahepatic biliary ductal dilatation is noted. The gallbladder is surgically absent. The spleen is unremarkable. The pancreas is grossly normal. Bilateral adrenal glands appear within normal limits. The kidneys are unremarkable.  The ureters are normal in caliber along their visualized course. The colon appears within normal limits. The appendix appears within normal limits. The small bowel is unremarkable. The bony structures are age appropriate. CT PELVIS FINDINGS: Suprapubic catheter is seen terminating within the bladder, otherwise the bladder is within normal  limits. 3.5 cm right ovarian cyst is seen, otherwise the uterus and adnexal structures appear within physiologic limits.     1.  No acute abnormality. 2.  Right ovarian cyst, stable since prior, consider follow-up pelvic sonogram for further characterization as clinically appropriate.      Ct-cspine With Plus Recons  Result Date: 1/20/2021 1/20/2021 12:01 PM HISTORY/REASON FOR EXAM:  Cervicalgia with acute bilateral lower extremity weakness, brisk upper extremity reflexes, absent patellar reflexes. Myelopathy TECHNIQUE/EXAM DESCRIPTION:  CT cervical spine with contrast with reconstructions. Thin-section helical scanning was performed from the skull base through T1 following the intrathecal administration of Omnipaque 300 nonionic contrast. Sagittal and coronal reconstructions were generated from the axial images. Low dose optimization technique was utilized for this CT exam including automated exposure control and adjustment of the mA and/or kV according to patient size. COMPARISON:  None. FINDINGS: Normal alignment Cervical cord is normal in contour without atrophy or enlargement noted. No central or foraminal stenosis is seen throughout the study. There is excellent contrast opacification of the thecal space. No ventral or other epidural defect is seen No disc height loss. There is mild C5-6 facet spurring on the left, posteriorly. This does not encroach upon the central canal or foramina. No uncovertebral spurring. No erosive change. No ankylosis. No erosive abnormality No prevertebral or other soft tissue swelling. Lung apices are clear Normal configuration of the thyroid gland without enlargement No lymphadenopathy       Ct-head W/o  Result Date: 1/19/2021 1/19/2021 12:00 PM HISTORY/REASON FOR EXAM:  Head trauma, coagulopathy (Age 19-64y). Fall. Pain. TECHNIQUE/EXAM DESCRIPTION AND NUMBER OF VIEWS: CT of the head without contrast. Contiguous axial sections were obtained from the skull base through the vertex.  Up to date radiation dose reduction adjustments have been utilized to meet ALARA standards for radiation dose reduction. COMPARISON:  12/27/2020 FINDINGS: The is no evidence of intraparenchymal, intraventricular and extra-axial hemorrhage.  The ventricles are within normal limits in size and configuration.  There is no mass effect or midline shift. There is mild mucosal thickening in the right maxillary sinus. Visualized mastoid air cells are clear. The calvarium is intact.     No acute intracranial abnormality is identified.      Ct-lspine W/o Plus Recons  Result Date: 1/19/2021 1/19/2021 12:00 PM HISTORY/REASON FOR EXAM:  Back pain after trauma. TECHNIQUE/EXAM DESCRIPTION AND NUMBER OF VIEWS: CT lumbar spine without contrast, with reconstructions. The study was performed on a helical multidetector CT scanner. Thin-section helical scanning was performed from T12-L1 to the sacrum. Sagittal and coronal multiplanar reconstructions were generated from the axial images. Low dose optimization technique was utilized for this CT exam including automated exposure control and adjustment of the mA and/or kV according to patient size. COMPARISON: 2/27/2020. FINDINGS: Alignment in the lumbar spine is normal. There is no fracture or dislocation. The thoracolumbar junction appears intact. The prevertebral and paraspinous soft tissues are unremarkable. L4-5 anterior fusion, with no evidence of hardware complication. Above this level, disc space heights are preserved. Lower lumbar facet hypertrophy. The sacrum and sacroiliac joints show no acute abnormality. Nonobstructing right nephrolithiasis. Cholecystectomy. A 3.5 cm right adnexal cyst     1.  No acute fracture or listhesis in the lumbar spine. 2.  Sequela of L4-5 anterior fusion, with no evidence of hardware complication. 3.  Nonobstructing right nephrolithiasis. 4.  A 3.5 cm right adnexal cyst.      Ct-lspine With Plus Recons  Result Date: 1/20/2021 1/20/2021 12:01 PM  HISTORY/REASON FOR EXAM:   myelopathy; w/ myelogram. TECHNIQUE/EXAM DESCRIPTION AND NUMBER OF VIEWS: CT lumbar spine with contrast, with reconstructions. Thin-section axial helical images were obtained from T12 through S1 following the intrathecal administration of  Omnipaque 300 nonionic contrast. Sagittal and coronal reconstructions were generated from the axial images. Low dose optimization technique was utilized for this CT exam including automated exposure control and adjustment of the mA and/or kV according to patient size. COMPARISON: 7/14/2018 FINDINGS: Normal alignment Some artifact related to L4/5 anterior plate and screw fixation with mature fusion. No hardware fracture or loosening is detected. There is also mild artifact related to neural stimulator overlying the left sacral plexus, lead extending through the S3/4 foramen. Generator right gluteal subcutaneous fat. No sacroiliitis identified The thecal sac is well contrast opacified. There is epidural lipomatosis along the ventral margin of the sac from mid L4 caudally There is no clumping of nerve roots. The conus terminate normal in position at T12/L1. Visualized conus is normal in appearance by CT At L3/4 level there is mild disc bulging but no significant central stenosis There is no ventral or other indentation of the upper thecal sac No foraminal stenosis No disc height loss L3 and T11 Schmorl's nodes No acute fracture is identified in the lumbar spine There is facet sclerosis at the L5/S1 level. There is a chronic left pars interarticularis defect. There is some sclerosis on the right but no fracture is identified. Paraspinous soft tissues are normal in appearance aside from cholecystectomy clips     Lumbosacral junction facet arthropathy with left pars interarticular defect, right interarticularis sclerosis suggesting stress response. This does not result in foraminal or central stenosis L4/5 anterior fusion is mature Epidural lipomatosis  caudally      Ct-tspine W/o Plus Recons  Result Date: 1/19/2021 1/19/2021 12:00 PM HISTORY/REASON FOR EXAM:  Fall. Pain. TECHNIQUE/EXAM DESCRIPTION AND NUMBER OF VIEWS:  CT thoracic spine without contrast, with reconstructions. Helical images were obtained of the thoracic spine in the axial plane.  Sagittal reconstructions were generated from the axial data. Low dose optimization technique was utilized for this CT exam including automated exposure control and adjustment of the mA and/or kV according to patient size. COMPARISON: None FINDINGS: No compression fracture is identified. There are multilevel Schmorl's nodes. There is multilevel intervertebral disc space narrowing and endplate spurring. Alignment of the facet joints is maintained. No spinal canal stenosis is seen. Visualized lung fields are clear.     No fracture or subluxation is seen in the thoracic spine. Multilevel Schmorl's nodes and degenerative changes.       Ct-tspine With Plus Recons  Result Date: 1/20/2021 1/20/2021 12:01 PM HISTORY/REASON FOR EXAM:  acute b/l LE weakness; w/ myelogram. TECHNIQUE/ EXAM DESCRIPTION AND NUMBER OF VIEWS:  CT thoracic spine with contrast, with reconstructions. Thin-section axial helical images were obtained of the thoracic spine following the intrathecal administration of Omnipaque 300 nonionic contrast. Sagittal and coronal reconstructions were generated from the axial images. Low dose optimization technique was utilized for this CT exam including automated exposure control and adjustment of the mA and/or kV according to patient size. COMPARISON: None. FINDINGS: The alignment is normal. Similar mild anterior wedge compression deformities are seen in the mid thoracic spine, T6, T7 and T8. There are multiple mid and caudal thoracic Schmorl's nodes. There is excellent contrast opacification of the thecal sac. No intradural extra medullary filling defects are seen. No ventral or other high-grade epidural impression is  identified. There is slight ligamentum flavum redundancy at the T7/8 level and there is some right T7/8 facet spurring which mildly indents the dorsal aspect of the sac but does not contact neural elements. Additional similar changes are seen caudal to this, greatest on the right at T9/10 where there is cord abutment right posteriorly The cord is normal in caliber throughout its course. Foramina are patent throughout The paravertebral soft tissues are notable for small right layering pleural effusion and some atelectasis.     Mild facet arthropathy and ligamentum flavum redundancy results in slight dorsolateral and dorsal thecal sac indentation but no brandon stenosis is identified Several mild anterior wedge compression deformities appear chronic Multilevel Schmorl's node formation. No disc protrusion/extrusion is identified            ASSESSMENT:  Patient is a 30 y.o. female admitted with a fall from a 4ft bed and subsequent bilateral LE paralysis and absent sensation, slowly improving to dull sensation and 1-2/5 strength BLE 1 day after the fall. MRI incompatible implants. CT and CT myelogram of C, T, and L spine without concerning structural findings.     CSF work up pending as of 1/20.       #Rehab:   -Vitals: stable, RA  -Insurance: Medicare/Medicaid FFS  -Discharge support: family may be able to assist (GP/mother)  -Rehab Impairment Code: 0016 - Debility (Non-Cardiac, Non-Pulmonary)  -Reason for admission: Weakness of both lower extremities  -Pending CSF work up results  -Therapy eval pending when feasible  -Reviewed CT myelogram of C, T, and L spine. Examination equivocal for structural pathology. At this time, it appears her weakness is mostly psychogenic in nature (?conversion disorder), although return of BLE clonus reflexes on 1/20 compared to absent reflexes on neurology's exam on 1/19 is interesting. Therefore, without an applicable rehab diagnosis, she is not a candidate for inpatient rehab. If there is  an organic explanation for her pathology, can reassess.   -Depending on therapy eval and recovery progress, recommend SNF vs. Home with home health.   -Placed referral to outpatient PM&R clinic with Dr. 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 CT myelogram reassuring. Strength/sensation impmroving.   -Of note, with Interstim implant 2018 to help with bowel/bladder function.   -Trileptal     #Mood/Sleep: buspar, Prozac, melatonin, trazodone, Geodon     #CV: asa, acetazolamide, ivabradine    #Hypokalemia: K 3.3 on 1/20, PRN Kdur    #Thrombocytopenia: Plt 148 on 1/20    #Hypothyroidism: synthroid     #Pain: robaxin     #CRION syndrome  -mycophenolate    #GI: omeprazole    #Bowel: chronic diarrhea; none documented since admit 1 d ago; senna s    #Bladder: chronic suprapubic cath, draining appropriatley    #DVT PPX: lovenox (on hold)            Discussed with pt, summarized hospitalization and care, options for next step of care  Labs reviewed as above.    Imaging personally reviewed:   CT L spine, 1/19/21: anterior hardware at L5/S1, no gross fractures, vertebral alignment reassuring   CT Myelogram of L and T spine, 1/20/21: no significant central canal stenosis    Chart review:  3/10/2016, Dr. Emanuel Delgado: “bilateral lower extremity weakness”, progressive, “antigravity strength of all muscle groups tested”, thought to be due to peripheral neuropathy with transient paraplegia weakness; also reviewed inpatient rehab notes from 7/8/2015, patient discharged to SNF due to not being able to tolerate 3h of therapies/day    Discussed with rehab team about recommendations         Thank you for allowing us to participate in the care of this patient.             Armand Ko MD  Physical Medicine and Rehabilitation   1/20/2021

## 2021-01-20 NOTE — ASSESSMENT & PLAN NOTE
Unknown etiology at this point with history of transverse myelitis, conversion disorder?  After fall and has back pain, chronic urinary retention and diarrhea  CT scan with myelogram is ordered per neurology  Blood work ordered per neurology  PT OT eval  Pain control

## 2021-01-20 NOTE — PROGRESS NOTES
Hospital Medicine Daily Progress Note    Date of Service  1/20/2021    Chief Complaint  30 y.o. female admitted 1/19/2021 with fall    Hospital Course  Ms. Kristin Balderrama is a 30 y.o. female with history of transverse myelitis, paroxysmal atrial fibrillation status post ablation 2016, chronic urinary retention requiring suprapubic catheter with recurrent urinary tract infections and history of ESBL, sleep apnea, diabetes, asthma, benign intracranial hypertension without papilledema, migraines who presented on 1/19/2021 with fall.  Patient fell from her bed the morning of admission.  Patient states that she landed on her face and hyperextended her legs.  Reports after the fall she had decreased sensation and was unable to move her legs.  Also endorses recent decreased p.o. intake for the past 5 days.  On presentation CT head, cervicals, Thoracics, lumbar spine were unremarkable without fractures or subluxations.  Urine drug screen was negative.  Neurology was consulted.  As her bladder stimulator implant was not MRI compatible neurology recommended CT myelogram with CSF analysis.      Interval Problem Update  Patient was seen and examined at bedside.  I have personally reviewed vitals, labs, and imaging.    1/20.  Patient was hypothermic this morning.  Episode of hypotension overnight has resolved.  On room air.  Continue potassium supplementation.  Denies fever, chills, chest pains, shortness of breath.  She does report a headache.  Persistent lower extremity weakness and numbness.    Consultants/Specialty  Neurology    Code Status  Full Code    Disposition  Medical clearance    Review of Systems  Review of Systems   Constitutional: Negative for chills and fever.   HENT: Negative for congestion and sore throat.    Eyes: Negative for blurred vision.   Respiratory: Negative for cough and shortness of breath.    Cardiovascular: Negative for chest pain, palpitations and leg swelling.   Gastrointestinal: Negative  for abdominal pain, constipation, diarrhea, nausea and vomiting.   Genitourinary: Negative for dysuria, frequency and urgency.   Musculoskeletal: Positive for back pain, falls and myalgias.   Skin: Negative for rash.   Neurological: Positive for tingling, weakness and headaches. Negative for dizziness.   Psychiatric/Behavioral: Negative for depression. The patient is not nervous/anxious.    All other systems reviewed and are negative.       Physical Exam  Temp:  [35.7 °C (96.2 °F)-36.4 °C (97.6 °F)] 35.8 °C (96.4 °F)  Pulse:  [60-84] 80  Resp:  [13-19] 16  BP: ()/(59-89) 117/61  SpO2:  [92 %-99 %] 94 %    Physical Exam  Vitals signs and nursing note reviewed.   Constitutional:       General: She is not in acute distress.     Appearance: Normal appearance.   HENT:      Head: Normocephalic and atraumatic.      Nose: Nose normal.      Mouth/Throat:      Mouth: Mucous membranes are moist.      Pharynx: Oropharynx is clear. No oropharyngeal exudate or posterior oropharyngeal erythema.   Eyes:      Extraocular Movements: Extraocular movements intact.      Conjunctiva/sclera: Conjunctivae normal.   Neck:      Musculoskeletal: Normal range of motion and neck supple.   Cardiovascular:      Rate and Rhythm: Normal rate and regular rhythm.      Pulses: Normal pulses.      Heart sounds: Normal heart sounds. No murmur.   Pulmonary:      Effort: Pulmonary effort is normal. No respiratory distress.      Breath sounds: Normal breath sounds. No stridor. No wheezing or rales.   Abdominal:      General: Abdomen is flat. Bowel sounds are normal. There is no distension.      Palpations: Abdomen is soft. There is no mass.      Tenderness: There is no abdominal tenderness.   Skin:     General: Skin is warm.      Capillary Refill: Capillary refill takes less than 2 seconds.   Neurological:      Mental Status: She is alert and oriented to person, place, and time. Mental status is at baseline.      Cranial Nerves: No cranial nerve  deficit.      Sensory: Sensory deficit (Reports dullness to lower extremities but this is symmetric encompassing the entire circumference of lower extremities.) present.      Motor: Weakness (Weakness of bilateral lower extremities) present.   Psychiatric:         Mood and Affect: Mood normal.         Behavior: Behavior normal.         Fluids  No intake or output data in the 24 hours ending 01/20/21 0728    Laboratory  Recent Labs     01/19/21  1504 01/20/21  0533   WBC 4.2* 3.7*   RBC 4.34 4.26   HEMOGLOBIN 12.5 12.6   HEMATOCRIT 40.0 39.0   MCV 92.2 91.5   MCH 28.8 29.6   MCHC 31.3* 32.3*   RDW 46.9 47.2   PLATELETCT 175 148*   MPV 12.5 11.9     Recent Labs     01/19/21  1504 01/20/21  0533   SODIUM 140 139   POTASSIUM 3.9 3.3*   CHLORIDE 115* 110   CO2 15* 16*   GLUCOSE 83 99   BUN 4* 6*   CREATININE 0.62 0.59   CALCIUM 8.8 8.6     Recent Labs     01/20/21  0533   APTT 28.5   INR 1.05         Recent Labs     01/20/21  0533   TRIGLYCERIDE 130   HDL 48   LDL 98       Imaging  CT-CSPINE WITH PLUS RECONS   Final Result         CT-LSPINE WITH PLUS RECONS   Final Result      Lumbosacral junction facet arthropathy with left pars interarticular defect, right interarticularis sclerosis suggesting stress response. This does not result in foraminal or central stenosis      L4/5 anterior fusion is mature      Epidural lipomatosis caudally      CT-TSPINE WITH PLUS RECONS   Final Result      Mild facet arthropathy and ligamentum flavum redundancy results in slight dorsolateral and dorsal thecal sac indentation but no brandon stenosis is identified      Several mild anterior wedge compression deformities appear chronic      Multilevel Schmorl's node formation. No disc protrusion/extrusion is identified      CT-TSPINE W/O PLUS RECONS   Final Result      No fracture or subluxation is seen in the thoracic spine.      Multilevel Schmorl's nodes and degenerative changes.         CT-LSPINE W/O PLUS RECONS   Final Result      1.  No  acute fracture or listhesis in the lumbar spine.   2.  Sequela of L4-5 anterior fusion, with no evidence of hardware complication.   3.  Nonobstructing right nephrolithiasis.   4.  A 3.5 cm right adnexal cyst.      CT-HEAD W/O   Final Result      No acute intracranial abnormality is identified.      CT-CSPINE WITHOUT PLUS RECONS   Final Result      No acute fracture or listhesis in the cervical spine.      DX-LUMBAR PUNCTURE FOR CT ONLY    (Results Pending)        Assessment/Plan  * Weakness of both lower extremities  Assessment & Plan  Unknown etiology at this point with history of transverse myelitis, conversion disorder?  After fall and has back pain, chronic urinary retention and diarrhea  CT scan with myelogram is ordered per neurology  Blood work ordered per neurology  PT OT eval  Pain control    Diabetes mellitus type 2 in obese (HCC)- (present on admission)  Assessment & Plan  Lab Results   Component Value Date/Time    HBA1C 5.1 10/09/2020 1243    HBA1C 5.0 08/15/2020 0502    HBA1C 6.0 (H) 08/29/2019 1427     Results from last 7 days   Lab Units 01/20/21  1224 01/20/21  0635 01/19/21  2326 01/15/21  2111   ACCU CHECK GLUCOSE 788 mg/dL 105* 99 96 86     I have ordered insulin sliding scale with D50 and glucagon for hypoglycemia per protocol.  Diabetic diet  Diabetic education    Idiopathic intracranial hypertension- (present on admission)  Assessment & Plan  On Diamox    History of supraventricular tachycardia- (present on admission)  Assessment & Plan  Ivabradine prescribed by cardiology    History of optic neuritis- (present on admission)  Assessment & Plan  Continue home CellCept    Mild intermittent asthma without complication- (present on admission)  Assessment & Plan  Not in exacerbation  Continue home inhalers    Hypothyroidism- (present on admission)  Assessment & Plan  Continue home Synthroid    Anxiety- (present on admission)  Assessment & Plan  Continue home medications buspirone, fluoxetine        VTE prophylaxis: Enoxaparin

## 2021-01-20 NOTE — PROGRESS NOTES
"Chief Complaint   Patient presents with   • T-5000 FALL   • Head Injury       Problem List Items Addressed This Visit     None        Neurology Progress Note     History of present illness:  This is a 30-year old female; well known to our outpatient and inpatient neurology services, with PMhx CRION syndrome, transverse myelitis, disabled at baseline however ambulatory, bladder stimulator implanted, benign intracranial hypertension without papilledema, migraine, and suspected underlying psychiatric component of the above who [facility] On 1/19/21 for a chief complaint of fall and subsequent BLE weakness.   The patient states that she felt to be in her usual state of healt last night; went to sleep. Around 0600, she reports that she rolled over and fell out of bed; she initially states that she fell onto her back/mid back, however then states that she \"bumped her head.\" At any rate, she had difficulty getting up, thus EMS was called. On arrival here, CT head unremarkable, CT C, T and L spine all without fracture or subluxations. Despite this, patient has persistent BLE weakness, worse than her baseline, prompting neurological consultation.   Currently patient is sitting up in bed; admits to persistent headache, \"all over,\" and endorses severe mid-back pain. She denies numbness/new numbness, admits to \"tingling\" to groin/saddle area; denies new problem with vision, speech or swallowing.     Neurology has been consulted by Dr. Valente Regalado to further evaluate the findings noted above.      Interval, 1/20/21:  Patient sitting up in bed; awake and alert, admits to diffuse pain. S/p CT myelogram this morning, awaiting results. Admits to mild improvement to BLE weakness, still with minimal antigravity movement. No events overnight.     No changes to HPI as was previously documented.     Past medical history:   Past Medical History:   Diagnosis Date   • Abdominal pain    • Anginal syndrome     random " "chest pain especially with tachycardia   • Apnea, sleep    • Arrhythmia     \"sinus tachycardia\", cariologist, Dr. Kumar; ablation 2/2016   • Arthritis     osteo   • ASTHMA     when around smoke   • Atrial fibrillation (HCC)    • Back pain    • Borderline personality disorder (HCC)    • Breath shortness     with tachycardia   • Bronchitis    • Cardiac arrhythmia    • Chickenpox    • Chronic obstructive pulmonary disease (HCC)    • Chronic UTI 9/18/2010   • Cough    • Daytime sleepiness    • Depression    • Diabetes (HCC)    • Diarrhea    • Disorder of thyroid    • Fall    • Fatigue    • Frequent headaches    • Gasping for breath    • Gynecological disorder     PCOS   • Headache(784.0)    • Heart burn    • History of falling    • Indigestion    • Migraine    • 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HCC)    • 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HCC)    • Nausea    • Obesity    • Other fatigue 6/29/2020   • Pain 08-15-12    back, 7/10   • Painful joint    • PCOS (polycystic ovarian syndrome)    • Pneumonia 2012   • Psychosis (HCC)    • Ringing in ears    • Scoliosis    • Shortness of breath    • Sinus tachycardia 10/31/2013   • Sleep apnea     CPAP \"pulmonary doctor took me off mid year 2016\"   • Snoring    • Tonsillitis    • 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HCC)    • Tuberculosis     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   • Urinary bladder disorder     Suprapubic cath   • Urinary incontinence    • Weakness    • Wears glasses        Past surgical history:   Past Surgical History:   Procedure Laterality Date   • MUSCLE BIOPSY Right 1/26/2017    Procedure: MUSCLE BIOPSY - THIGH;  Surgeon: Isidro Vigil M.D.;  Location: SURGERY [facility]S;  Service:    • GASTROSCOPY WITH BALLOON DILATATION N/A 1/4/2017    Procedure: GASTROSCOPY WITH DILATATION;  Surgeon: Torres Vargas M.D.;  Location: SURGERY [facility];  Service:    • BOWEL STIMULATOR INSERTION  7/13/2016    Procedure: BOWEL STIMULATOR INSERTION FOR PERMANENT INTERSTIM SACRAL IMPLANT;  " Surgeon: Joe Noyola M.D.;  Location: SURGERY [facility];  Service:    • RECOVERY  1/27/2016    Procedure: CATH LAB EP STUDY WITH SINUS NODE MODIFICATION ABHINAV;  Surgeon: Recoveryonly Surgery;  Location: SURGERY PRE-POST PROC UNIT [facility];  Service:    • OTHER CARDIAC SURGERY  1/2016    cardiac ablation   • NEURO DEST FACET L/S W/IG SNGL  4/21/2015    Performed by Reza Tabor at SURGERY [facility]   • LUMBAR FUSION ANTERIOR  8/21/2012    Performed by SUSIE SAWANT at SURGERY [facility] ORS   • ALYSSA BY LAPAROSCOPY  8/29/2010    Performed by SHAYY JOHNS at SURGERY [facility] ORS   • LAMINOTOMY     • OTHER ABDOMINAL SURGERY     • TONSILLECTOMY      tonsillectomy       Family history:   Family History   Problem Relation Age of Onset   • Hypertension Mother    • Sleep Apnea Mother    • Heart Disease Mother    • Other Mother         hypothryod   • Hypertension Maternal Uncle    • Heart Disease Maternal Grandmother    • Hypertension Maternal Grandmother    • No Known Problems Sister    • Other Sister         Narcolepsy;fibromyalsia;bone;nerve   • Genitourinary () Problems Sister         endometriosis       Social history:   Social History     Socioeconomic History   • Marital status: Single     Spouse name: Not on file   • Number of children: Not on file   • Years of education: Not on file   • Highest education level: Not on file   Occupational History   • Not on file   Social Needs   • Financial resource strain: Not hard at all   • Food insecurity     Worry: Never true     Inability: Never true   • Transportation needs     Medical: No     Non-medical: No   Tobacco Use   • Smoking status: Never Smoker   • Smokeless tobacco: Never Used   Substance and Sexual Activity   • Alcohol use: No     Alcohol/week: 0.0 oz     Frequency: Never     Binge frequency: Never   • Drug use: Not Currently     Frequency: 7.0 times per week     Types: Marijuana   • Sexual activity: Not Currently     Birth  control/protection: Pill   Lifestyle   • Physical activity     Days per week: Not on file     Minutes per session: Not on file   • Stress: Not on file   Relationships   • Social connections     Talks on phone: Not on file     Gets together: Not on file     Attends Anabaptism service: Not on file     Active member of club or organization: Not on file     Attends meetings of clubs or organizations: Not on file     Relationship status: Not on file   • Intimate partner violence     Fear of current or ex partner: Not on file     Emotionally abused: Not on file     Physically abused: Not on file     Forced sexual activity: Not on file   Other Topics Concern   • Not on file   Social History Narrative    ** Merged History Encounter **            Current medications:   Current Facility-Administered Medications   Medication Dose   • senna-docusate (PERICOLACE or SENOKOT S) 8.6-50 MG per tablet 2 Tab  2 Tab    And   • polyethylene glycol/lytes (MIRALAX) PACKET 1 Packet  1 Packet    And   • magnesium hydroxide (MILK OF MAGNESIA) suspension 30 mL  30 mL    And   • bisacodyl (DULCOLAX) suppository 10 mg  10 mg   • enoxaparin (LOVENOX) inj 40 mg  40 mg   • acetaminophen (Tylenol) tablet 650 mg  650 mg   • Pharmacy Consult Request ...Pain Management Review 1 Each  1 Each    And   • oxyCODONE immediate-release (ROXICODONE) tablet 2.5 mg  2.5 mg    And   • oxyCODONE immediate-release (ROXICODONE) tablet 5 mg  5 mg    And   • morphine (pf) 4 mg/mL injection 2 mg  2 mg   • ondansetron (ZOFRAN) syringe/vial injection 4 mg  4 mg   • ondansetron (ZOFRAN ODT) dispertab 4 mg  4 mg   • promethazine (PHENERGAN) tablet 12.5-25 mg  12.5-25 mg   • promethazine (PHENERGAN) suppository 12.5-25 mg  12.5-25 mg   • prochlorperazine (COMPAZINE) injection 5-10 mg  5-10 mg   • acetaZOLAMIDE SR (DIAMOX) capsule 500 mg  500 mg   • albuterol inhaler 2 Puff  2 Puff   • aspirin EC (ECOTRIN) tablet 81 mg  81 mg   • busPIRone (BUSPAR) tablet 10 mg  10 mg   •  "FLUoxetine (PROZAC) capsule 40 mg  40 mg   • omeprazole (PRILOSEC) capsule 40 mg  40 mg   • ipratropium-albuterol (DUONEB) nebulizer solution  3 mL   • ivabradine (Corlanor) tablet 7.5 mg  7.5 mg   • levothyroxine (SYNTHROID) tablet 100 mcg  100 mcg   • melatonin tablet 10 mg  10 mg   • methocarbamol (ROBAXIN) tablet 750 mg  750 mg   • mycophenolate (CELLCEPT) capsule 1,000 mg  1,000 mg   • OXcarbazepine (TRILEPTAL) tablet 300 mg  300 mg   • topiramate (TOPAMAX) tablet 50 mg  50 mg   • sodium bicarbonate tablet 650 mg  650 mg   • potassium chloride SA (Kdur) tablet 40 mEq  40 mEq   • traZODone (DESYREL) tablet 100 mg  100 mg   • ziprasidone (GEODON) capsule 40 mg  40 mg   • insulin regular (HumuLIN R,NovoLIN R) injection  2-9 Units    And   • glucose 4 g chewable tablet 16 g  16 g    And   • dextrose 50% (D50W) injection 50 mL  50 mL       Medication Allergy:  Allergies   Allergen Reactions   • Depakote [Divalproex Sodium] Unspecified     Muscle spasms/muscle pain and weakness     • Amitriptyline Unspecified     Headaches     • Aripiprazole [Abilify] Unspecified     Headaches/muscle twitching     • Clindamycin Nausea     Even with food     • Flagyl [Metronidazole Hcl] Unspecified     \"eye problems\"     • Flomax [Tamsulosin Hydrochloride] Swelling   • Levaquin Unspecified     Severe muscle cramps in legs  RXN=unknown   • Metformin Unspecified     Increased lactic acid      • Tape Rash     Tears skin off  coban with Tegaderm tape ok intermittently  RXN=ongoing   • Vancomycin Itching     Pt becomes flushed in face and chest.   RXN=7/10/16   • Wound Dressing Adhesive Hives     By pt report   • Ampicillin Rash     Pt reports that she received a rash    • Ciprofloxacin    • Keflex Rash     Pt states she gets a rash with this medication  Tolerates ceftriaxone   • Levofloxacin Unspecified     Leg muscle cramps   • Metronidazole Rash     .   • Penicillins Hives   • Sulfa Drugs Myalgia     Muscle pain and weakness   • " Tamsulosin Swelling   • Valproic Acid Rash     .       Review of systems:   Constitutional: denies fever, night sweats, weight loss.   Eyes: denies acute vision change, eye pain or secretion.   Ears, Nose, Mouth, Throat: denies nasal secretion, nasal bleeding, difficulty swallowing, hearing loss, tinnitus, vertigo, ear pain, acute dental problems, oral ulcers or lesions.   Endocrine: denies recent weight changes, heat or cold intolerance, polyuria, polydypsia, polyphagia,abnormal hair growth.  Cardiovascular: denies new onset of chest pain, palpitations, syncope, or dyspnea of exertion.  Pulmonary: denies shortness of breath, new onset of cough, hemoptysis, wheezing, chest pain or flu-like symptoms.   GI: denies nausea, vomiting, diarrhea, GI bleeding, change in appetite, abdominal pain, and change in bowel habits.  : denies dysuria, urinary incontinence, hematuria.  Heme/oncology: denies history of easy bruising or bleeding. No history of cancer, DVTor PE.  Allergy/immunology: denies hives/urticaria, or itching.   Dermatologic: denies new rash, or new skin lesions.  Musculoskeletal:denies joint swelling or pain, muscle pain, neck and back pain.   Neurologic: As noted above.   Psychiatric: denies symptoms of depression, anxiety, hallucinations, mood swings or changes, suicidal or homicidal thoughts.     Physical examination:   Vitals:    01/20/21 0000 01/20/21 0400 01/20/21 0800 01/20/21 0832   BP: (!) 97/59 117/61 102/50    Pulse: 84 80 87    Resp: 16 16 20    Temp: (!) 35.7 °C (96.3 °F) (!) 35.8 °C (96.4 °F) 36.2 °C (97.2 °F)    TempSrc: Temporal Temporal Temporal    SpO2: 95% 94% 93%    Weight:    108 kg (238 lb 1.6 oz)   Height:         General: Patient in no acute distress, pleasant and cooperative.  HEENT: Normocephalic, no signs of acute trauma.   Neck: supple, no meningeal signs or carotid bruits. There is normal range of motion. No tenderness on exam.   Chest: clear to auscultation. No cough.   CV: RRR,  no murmurs.   Skin: no signs of acute rashes or trauma.   Musculoskeletal: joints exhibit full range of motion, without any pain to palpation. There are no signs of joint or muscle swelling. There is no tenderness to deep palpation of muscles.   Psychiatric: No hallucinatory behavior. Denies symptoms of depression or suicidal ideation. Mood and affect appear normal on exam.     NEUROLOGICAL EXAM:   Mental status, orientation: Awake, alert and fully oriented.   Speech and language: speech is clear and fluent. The patient is able to name, repeat and comprehend.   Memory: There is intact recollection of recent and remote events.   Cranial nerve exam: Pupils are 3-4 mm bilaterally and equally reactive to light and accommodation. Visual fields are intact by confrontation. There is no nystagmus on primary or secondary gaze. Intact full EOM in all directions of gaze. Face appears symmetric. Sensation in the face is intact to light touch. Uvula is midline. Palate elevates symmetrically. Tongue is midline and without any signs of tongue biting or fasciculations. Shoulder shrug is intact bilaterally.   Motor exam: Strength is 5/5 in BUE; endorses pain with examination. 0-1/5 to BLE, with positive Morris test BL. Tone is normal. No abnormal movements were seen on exam.   Sensory exam Decreased/dull sensation to BLE (baseline); otherwise normal.   Deep tendon reflexes:  3 to BUE; areflexic to BLE. Plantar responses are mute. There is no clonus.   Coordination: shows a normal finger-nose-finger. Normal rapidly alternating movements.   Gait: Not assessed as patient is currently unable.       No significant changes to exam as was documented on 1/19/21.        ANCILLARY DATA REVIEWED:     Lab Data Review:  Recent Results (from the past 24 hour(s))   CBC WITH DIFFERENTIAL    Collection Time: 01/19/21  3:04 PM   Result Value Ref Range    WBC 4.2 (L) 4.8 - 10.8 K/uL    RBC 4.34 4.20 - 5.40 M/uL    Hemoglobin 12.5 12.0 - 16.0 g/dL     Hematocrit 40.0 37.0 - 47.0 %    MCV 92.2 81.4 - 97.8 fL    MCH 28.8 27.0 - 33.0 pg    MCHC 31.3 (L) 33.6 - 35.0 g/dL    RDW 46.9 35.9 - 50.0 fL    Platelet Count 175 164 - 446 K/uL    MPV 12.5 9.0 - 12.9 fL    Neutrophils-Polys 59.40 44.00 - 72.00 %    Lymphocytes 29.10 22.00 - 41.00 %    Monocytes 8.30 0.00 - 13.40 %    Eosinophils 2.10 0.00 - 6.90 %    Basophils 0.90 0.00 - 1.80 %    Immature Granulocytes 0.20 0.00 - 0.90 %    Nucleated RBC 0.00 /100 WBC    Neutrophils (Absolute) 2.50 2.00 - 7.15 K/uL    Lymphs (Absolute) 1.23 1.00 - 4.80 K/uL    Monos (Absolute) 0.35 0.00 - 0.85 K/uL    Eos (Absolute) 0.09 0.00 - 0.51 K/uL    Baso (Absolute) 0.04 0.00 - 0.12 K/uL    Immature Granulocytes (abs) 0.01 0.00 - 0.11 K/uL    NRBC (Absolute) 0.00 K/uL   COMP METABOLIC PANEL    Collection Time: 01/19/21  3:04 PM   Result Value Ref Range    Sodium 140 135 - 145 mmol/L    Potassium 3.9 3.6 - 5.5 mmol/L    Chloride 115 (H) 96 - 112 mmol/L    Co2 15 (L) 20 - 33 mmol/L    Anion Gap 10.0 7.0 - 16.0    Glucose 83 65 - 99 mg/dL    Bun 4 (L) 8 - 22 mg/dL    Creatinine 0.62 0.50 - 1.40 mg/dL    Calcium 8.8 8.5 - 10.5 mg/dL    AST(SGOT) 13 12 - 45 U/L    ALT(SGPT) 20 2 - 50 U/L    Alkaline Phosphatase 67 30 - 99 U/L    Total Bilirubin 0.4 0.1 - 1.5 mg/dL    Albumin 4.0 3.2 - 4.9 g/dL    Total Protein 5.6 (L) 6.0 - 8.2 g/dL    Globulin 1.6 (L) 1.9 - 3.5 g/dL    A-G Ratio 2.5 g/dL   URINALYSIS CULTURE, IF INDICATED    Collection Time: 01/19/21  3:04 PM    Specimen: Urine   Result Value Ref Range    Color Yellow     Character Clear     Specific Gravity 1.025 <1.035    Ph 6.0 5.0 - 8.0    Glucose Negative Negative mg/dL    Ketones Negative Negative mg/dL    Protein Negative Negative mg/dL    Bilirubin Negative Negative    Urobilinogen, Urine 0.2 Negative    Nitrite Negative Negative    Leukocyte Esterase Negative Negative    Occult Blood Negative Negative    Micro Urine Req see below    URINE DRUG SCREEN    Collection Time: 01/19/21   3:04 PM   Result Value Ref Range    Amphetamines Urine Negative Negative    Barbiturates Negative Negative    Benzodiazepines Negative Negative    Cocaine Metabolite Negative Negative    Methadone Negative Negative    Opiates Negative Negative    Oxycodone Negative Negative    Phencyclidine -Pcp Negative Negative    Propoxyphene Negative Negative    Cannabinoid Metab Negative Negative   ESTIMATED GFR    Collection Time: 01/19/21  3:04 PM   Result Value Ref Range    GFR If African American >60 >60 mL/min/1.73 m 2    GFR If Non African American >60 >60 mL/min/1.73 m 2   VITAMIN B12    Collection Time: 01/19/21  3:04 PM   Result Value Ref Range    Vitamin B12 -True Cobalamin 680 211 - 911 pg/mL   FOLATE    Collection Time: 01/19/21  3:04 PM   Result Value Ref Range    Folate -Folic Acid 10.8 >4.0 ng/mL   CRP QUANTITIVE (NON-CARDIAC)    Collection Time: 01/19/21  3:04 PM   Result Value Ref Range    Stat C-Reactive Protein 0.14 0.00 - 0.75 mg/dL   PROCALCITONIN    Collection Time: 01/19/21  3:04 PM   Result Value Ref Range    Procalcitonin 0.07 <0.25 ng/mL   MAGNESIUM    Collection Time: 01/19/21  3:04 PM   Result Value Ref Range    Magnesium 1.9 1.5 - 2.5 mg/dL   COV-2, FLU A/B, AND RSV BY PCR (2-4 HOURS CEPHEID): Collect NP swab in VTM    Collection Time: 01/19/21  3:19 PM    Specimen: Respirate   Result Value Ref Range    Influenza virus A RNA Negative Negative    Influenza virus B, PCR Negative Negative    RSV, PCR Negative Negative    SARS-CoV-2 by PCR NotDetected     SARS-CoV-2 Source NP Swab    ACCU-CHEK GLUCOSE    Collection Time: 01/19/21 11:26 PM   Result Value Ref Range    Glucose - Accu-Ck 96 65 - 99 mg/dL   Sed Rate    Collection Time: 01/20/21  5:29 AM   Result Value Ref Range    Sed Rate Westergren 6 0 - 20 mm/hour   HIV AG/AB COMBO ASSAY DIAGNOSTIC    Collection Time: 01/20/21  5:29 AM   Result Value Ref Range    HIV Ag/Ab Combo Assay Non-Reactive Non Reactive   T.PALLIDUM AB EIA    Collection Time:  01/20/21  5:29 AM   Result Value Ref Range    Syphilis, Treponemal Qual Non-Reactive Non-Reactive   TSH WITH REFLEX TO FT4    Collection Time: 01/20/21  5:33 AM   Result Value Ref Range    TSH 1.240 0.380 - 5.330 uIU/mL   Prothrombin Time    Collection Time: 01/20/21  5:33 AM   Result Value Ref Range    PT 14.0 12.0 - 14.6 sec    INR 1.05 0.87 - 1.13   Lipid Profile (Lipid Panel) Fasting    Collection Time: 01/20/21  5:33 AM   Result Value Ref Range    Cholesterol,Tot 172 100 - 199 mg/dL    Triglycerides 130 0 - 149 mg/dL    HDL 48 >=40 mg/dL    LDL 98 <100 mg/dL   APTT    Collection Time: 01/20/21  5:33 AM   Result Value Ref Range    APTT 28.5 24.7 - 36.0 sec   CBC with Differential    Collection Time: 01/20/21  5:33 AM   Result Value Ref Range    WBC 3.7 (L) 4.8 - 10.8 K/uL    RBC 4.26 4.20 - 5.40 M/uL    Hemoglobin 12.6 12.0 - 16.0 g/dL    Hematocrit 39.0 37.0 - 47.0 %    MCV 91.5 81.4 - 97.8 fL    MCH 29.6 27.0 - 33.0 pg    MCHC 32.3 (L) 33.6 - 35.0 g/dL    RDW 47.2 35.9 - 50.0 fL    Platelet Count 148 (L) 164 - 446 K/uL    MPV 11.9 9.0 - 12.9 fL    Neutrophils-Polys 61.70 44.00 - 72.00 %    Lymphocytes 26.90 22.00 - 41.00 %    Monocytes 8.10 0.00 - 13.40 %    Eosinophils 2.20 0.00 - 6.90 %    Basophils 0.80 0.00 - 1.80 %    Immature Granulocytes 0.30 0.00 - 0.90 %    Nucleated RBC 0.00 /100 WBC    Neutrophils (Absolute) 2.30 2.00 - 7.15 K/uL    Lymphs (Absolute) 1.00 1.00 - 4.80 K/uL    Monos (Absolute) 0.30 0.00 - 0.85 K/uL    Eos (Absolute) 0.08 0.00 - 0.51 K/uL    Baso (Absolute) 0.03 0.00 - 0.12 K/uL    Immature Granulocytes (abs) 0.01 0.00 - 0.11 K/uL    NRBC (Absolute) 0.00 K/uL   Comp Metabolic Panel (CMP)    Collection Time: 01/20/21  5:33 AM   Result Value Ref Range    Sodium 139 135 - 145 mmol/L    Potassium 3.3 (L) 3.6 - 5.5 mmol/L    Chloride 110 96 - 112 mmol/L    Co2 16 (L) 20 - 33 mmol/L    Anion Gap 13.0 7.0 - 16.0    Glucose 99 65 - 99 mg/dL    Bun 6 (L) 8 - 22 mg/dL    Creatinine 0.59 0.50 -  1.40 mg/dL    Calcium 8.6 8.5 - 10.5 mg/dL    AST(SGOT) 56 (H) 12 - 45 U/L    ALT(SGPT) 30 2 - 50 U/L    Alkaline Phosphatase 78 30 - 99 U/L    Total Bilirubin 0.6 0.1 - 1.5 mg/dL    Albumin 3.8 3.2 - 4.9 g/dL    Total Protein 5.2 (L) 6.0 - 8.2 g/dL    Globulin 1.4 (L) 1.9 - 3.5 g/dL    A-G Ratio 2.7 g/dL   ESTIMATED GFR    Collection Time: 01/20/21  5:33 AM   Result Value Ref Range    GFR If African American >60 >60 mL/min/1.73 m 2    GFR If Non African American >60 >60 mL/min/1.73 m 2   HSV (HERPES SIMPLEX VIRUS) BY PCR (BLOOD)    Collection Time: 01/20/21  5:43 AM   Result Value Ref Range    HSV Source Serum    ACCU-CHEK GLUCOSE    Collection Time: 01/20/21  6:35 AM   Result Value Ref Range    Glucose - Accu-Ck 99 65 - 99 mg/dL       Labs reviewed by me.     Records reviewed:   Extensively reviewed patient's record/previous encounters at [facility].       Imaging reviewed by me:     CT-TSPINE W/O PLUS RECONS   Final Result      No fracture or subluxation is seen in the thoracic spine.      Multilevel Schmorl's nodes and degenerative changes.         CT-LSPINE W/O PLUS RECONS   Final Result      1.  No acute fracture or listhesis in the lumbar spine.   2.  Sequela of L4-5 anterior fusion, with no evidence of hardware complication.   3.  Nonobstructing right nephrolithiasis.   4.  A 3.5 cm right adnexal cyst.      CT-HEAD W/O   Final Result      No acute intracranial abnormality is identified.      CT-CSPINE WITHOUT PLUS RECONS   Final Result      No acute fracture or listhesis in the cervical spine.      CT-CSPINE WITH PLUS RECONS    (Results Pending)   CT-LSPINE WITH PLUS RECONS    (Results Pending)   CT-TSPINE WITH PLUS RECONS    (Results Pending)   DX-LUMBAR PUNCTURE FOR CT ONLY    (Results Pending)         Modified Bacilio Scale (MRS): 2 = Slight disability; unable to perform all previous activities but able to look after own affairs without assistance      ASSESSMENT AND PLAN:  30-year old female; well known to  our outpatient and inpatient neurology services, with PMhx CRION syndrome, transverse myelitis, disabled at baseline however ambulatory, bladder stimulator implanted, idiopathic intracranial hypertension without papilledema, migraine, and suspected underlying psychiatric component of the above who presented to [facility] On 1/19/21 for a chief complaint of fall and subsequent BLE weakness; on arrival here, CT head, C, T and L spine all unremarkable. Despite this, patient's BLE weakness/paralysis persists; somewhat improved today; still with diffuse pain and tingling to BLE/groin area. Patient unable to undergo MRI secondary to implanted bladder stimulator; thus, CT myelogram was pursed and completed today. Read/results are pending.     Impression:   BLE weakness following a ground level fall; compressive trauma such as Cauda Equina versus myelopathy versus functional.   Hx of transverse myelitis with decreased functional status at baseline.     Additional Recommendations/Plan:     -q4h and PRN neuro assessment. VS per nursing/unit protocol.   -PT/OT eval and treat. Physiatry consultation.  -Check UDS, ETOH-- both WNL. Note UA WNL.   -Will follow up with results of myelography and make additional recs accordingly. All other medical management (including conservative pain management) per primary team.    -DVT PPX: SCDs.     The plan of care above has been discussed with Dr. Rincon.     DEANDRA [email or web address].[email or web address].  [facility]

## 2021-01-21 ENCOUNTER — HOSPITAL ENCOUNTER (INPATIENT)
Facility: REHABILITATION | Age: 31
End: 2021-01-21
Attending: PHYSICAL MEDICINE & REHABILITATION | Admitting: PHYSICAL MEDICINE & REHABILITATION
Payer: MEDICARE

## 2021-01-21 LAB
ALBUMIN SERPL BCP-MCNC: 3.7 G/DL (ref 3.2–4.9)
ALBUMIN/GLOB SERPL: 1.9 G/DL
ALP SERPL-CCNC: 93 U/L (ref 30–99)
ALT SERPL-CCNC: 81 U/L (ref 2–50)
ANION GAP SERPL CALC-SCNC: 12 MMOL/L (ref 7–16)
AST SERPL-CCNC: 79 U/L (ref 12–45)
BASOPHILS # BLD AUTO: 0.6 % (ref 0–1.8)
BASOPHILS # BLD: 0.02 K/UL (ref 0–0.12)
BILIRUB SERPL-MCNC: 0.5 MG/DL (ref 0.1–1.5)
BUN SERPL-MCNC: 4 MG/DL (ref 8–22)
CALCIUM SERPL-MCNC: 9.1 MG/DL (ref 8.5–10.5)
CHLORIDE SERPL-SCNC: 116 MMOL/L (ref 96–112)
CO2 SERPL-SCNC: 17 MMOL/L (ref 20–33)
CREAT SERPL-MCNC: 0.52 MG/DL (ref 0.5–1.4)
EOSINOPHIL # BLD AUTO: 0.1 K/UL (ref 0–0.51)
EOSINOPHIL NFR BLD: 3 % (ref 0–6.9)
ERYTHROCYTE [DISTWIDTH] IN BLOOD BY AUTOMATED COUNT: 47.5 FL (ref 35.9–50)
GLOBULIN SER CALC-MCNC: 2 G/DL (ref 1.9–3.5)
GLUCOSE BLD-MCNC: 101 MG/DL (ref 65–99)
GLUCOSE BLD-MCNC: 103 MG/DL (ref 65–99)
GLUCOSE BLD-MCNC: 153 MG/DL (ref 65–99)
GLUCOSE SERPL-MCNC: 103 MG/DL (ref 65–99)
HCT VFR BLD AUTO: 41.6 % (ref 37–47)
HGB BLD-MCNC: 13.2 G/DL (ref 12–16)
IMM GRANULOCYTES # BLD AUTO: 0.01 K/UL (ref 0–0.11)
IMM GRANULOCYTES NFR BLD AUTO: 0.3 % (ref 0–0.9)
LYMPHOCYTES # BLD AUTO: 0.92 K/UL (ref 1–4.8)
LYMPHOCYTES NFR BLD: 28 % (ref 22–41)
MAGNESIUM SERPL-MCNC: 1.8 MG/DL (ref 1.5–2.5)
MCH RBC QN AUTO: 29.2 PG (ref 27–33)
MCHC RBC AUTO-ENTMCNC: 31.7 G/DL (ref 33.6–35)
MCV RBC AUTO: 92 FL (ref 81.4–97.8)
MONOCYTES # BLD AUTO: 0.31 K/UL (ref 0–0.85)
MONOCYTES NFR BLD AUTO: 9.5 % (ref 0–13.4)
NEUTROPHILS # BLD AUTO: 1.92 K/UL (ref 2–7.15)
NEUTROPHILS NFR BLD: 58.6 % (ref 44–72)
NRBC # BLD AUTO: 0 K/UL
NRBC BLD-RTO: 0 /100 WBC
PHOSPHATE SERPL-MCNC: 3.9 MG/DL (ref 2.5–4.5)
PLATELET # BLD AUTO: 162 K/UL (ref 164–446)
PMV BLD AUTO: 12.1 FL (ref 9–12.9)
POTASSIUM SERPL-SCNC: 4 MMOL/L (ref 3.6–5.5)
PROT SERPL-MCNC: 5.7 G/DL (ref 6–8.2)
RBC # BLD AUTO: 4.52 M/UL (ref 4.2–5.4)
SODIUM SERPL-SCNC: 145 MMOL/L (ref 135–145)
WBC # BLD AUTO: 3.3 K/UL (ref 4.8–10.8)

## 2021-01-21 PROCEDURE — 84100 ASSAY OF PHOSPHORUS: CPT

## 2021-01-21 PROCEDURE — 770020 HCHG ROOM/CARE - TELE (206)

## 2021-01-21 PROCEDURE — 99232 SBSQ HOSP IP/OBS MODERATE 35: CPT | Performed by: INTERNAL MEDICINE

## 2021-01-21 PROCEDURE — 82962 GLUCOSE BLOOD TEST: CPT

## 2021-01-21 PROCEDURE — 36415 COLL VENOUS BLD VENIPUNCTURE: CPT

## 2021-01-21 PROCEDURE — 83735 ASSAY OF MAGNESIUM: CPT

## 2021-01-21 PROCEDURE — 99231 SBSQ HOSP IP/OBS SF/LOW 25: CPT | Performed by: NURSE PRACTITIONER

## 2021-01-21 PROCEDURE — 80053 COMPREHEN METABOLIC PANEL: CPT

## 2021-01-21 PROCEDURE — 700102 HCHG RX REV CODE 250 W/ 637 OVERRIDE(OP): Performed by: HOSPITALIST

## 2021-01-21 PROCEDURE — 97165 OT EVAL LOW COMPLEX 30 MIN: CPT

## 2021-01-21 PROCEDURE — 700111 HCHG RX REV CODE 636 W/ 250 OVERRIDE (IP): Performed by: HOSPITALIST

## 2021-01-21 PROCEDURE — A9270 NON-COVERED ITEM OR SERVICE: HCPCS | Performed by: HOSPITALIST

## 2021-01-21 PROCEDURE — 85025 COMPLETE CBC W/AUTO DIFF WBC: CPT

## 2021-01-21 RX ADMIN — SODIUM BICARBONATE 650 MG: 650 TABLET ORAL at 10:40

## 2021-01-21 RX ADMIN — POTASSIUM CHLORIDE 40 MEQ: 1500 TABLET, EXTENDED RELEASE ORAL at 17:59

## 2021-01-21 RX ADMIN — ZIPRASIDONE HYDROCHLORIDE 40 MG: 40 CAPSULE ORAL at 04:37

## 2021-01-21 RX ADMIN — MYCOPHENOLATE MOFETIL 1000 MG: 250 CAPSULE ORAL at 04:39

## 2021-01-21 RX ADMIN — ZIPRASIDONE HYDROCHLORIDE 40 MG: 40 CAPSULE ORAL at 18:12

## 2021-01-21 RX ADMIN — IVABRADINE 7.5 MG: 7.5 TABLET, FILM COATED ORAL at 17:57

## 2021-01-21 RX ADMIN — ACETAZOLAMIDE 500 MG: 500 CAPSULE, EXTENDED RELEASE ORAL at 04:36

## 2021-01-21 RX ADMIN — OXYCODONE 5 MG: 5 TABLET ORAL at 04:37

## 2021-01-21 RX ADMIN — TOPIRAMATE 50 MG: 25 TABLET, FILM COATED ORAL at 17:58

## 2021-01-21 RX ADMIN — BUSPIRONE HYDROCHLORIDE 10 MG: 10 TABLET ORAL at 17:57

## 2021-01-21 RX ADMIN — ACETAZOLAMIDE 500 MG: 500 CAPSULE, EXTENDED RELEASE ORAL at 17:58

## 2021-01-21 RX ADMIN — OXYCODONE 5 MG: 5 TABLET ORAL at 21:11

## 2021-01-21 RX ADMIN — Medication 10 MG: at 21:11

## 2021-01-21 RX ADMIN — TRAZODONE HYDROCHLORIDE 100 MG: 100 TABLET ORAL at 21:11

## 2021-01-21 RX ADMIN — METHOCARBAMOL 750 MG: 750 TABLET ORAL at 21:11

## 2021-01-21 RX ADMIN — ASPIRIN 81 MG: 81 TABLET, COATED ORAL at 04:36

## 2021-01-21 RX ADMIN — OMEPRAZOLE 40 MG: 20 CAPSULE, DELAYED RELEASE ORAL at 10:41

## 2021-01-21 RX ADMIN — OXCARBAZEPINE 300 MG: 300 TABLET, FILM COATED ORAL at 04:40

## 2021-01-21 RX ADMIN — LEVOTHYROXINE SODIUM 100 MCG: 0.1 TABLET ORAL at 04:37

## 2021-01-21 RX ADMIN — ONDANSETRON 4 MG: 4 TABLET, ORALLY DISINTEGRATING ORAL at 10:40

## 2021-01-21 RX ADMIN — FLUOXETINE 40 MG: 20 CAPSULE ORAL at 04:38

## 2021-01-21 RX ADMIN — POTASSIUM CHLORIDE 40 MEQ: 1500 TABLET, EXTENDED RELEASE ORAL at 10:40

## 2021-01-21 RX ADMIN — TOPIRAMATE 50 MG: 25 TABLET, FILM COATED ORAL at 04:40

## 2021-01-21 RX ADMIN — BUSPIRONE HYDROCHLORIDE 10 MG: 10 TABLET ORAL at 13:26

## 2021-01-21 RX ADMIN — ENOXAPARIN SODIUM 40 MG: 40 INJECTION SUBCUTANEOUS at 04:36

## 2021-01-21 RX ADMIN — BUSPIRONE HYDROCHLORIDE 10 MG: 10 TABLET ORAL at 04:37

## 2021-01-21 RX ADMIN — MYCOPHENOLATE MOFETIL 1000 MG: 250 CAPSULE ORAL at 18:14

## 2021-01-21 RX ADMIN — IVABRADINE 7.5 MG: 7.5 TABLET, FILM COATED ORAL at 04:38

## 2021-01-21 RX ADMIN — OXCARBAZEPINE 300 MG: 300 TABLET, FILM COATED ORAL at 18:12

## 2021-01-21 RX ADMIN — OXYCODONE 5 MG: 5 TABLET ORAL at 10:41

## 2021-01-21 RX ADMIN — MORPHINE SULFATE 2 MG: 4 INJECTION INTRAVENOUS at 13:49

## 2021-01-21 RX ADMIN — ONDANSETRON 4 MG: 2 INJECTION INTRAMUSCULAR; INTRAVENOUS at 18:24

## 2021-01-21 RX ADMIN — DOCUSATE SODIUM 50 MG AND SENNOSIDES 8.6 MG 2 TABLET: 8.6; 5 TABLET, FILM COATED ORAL at 04:39

## 2021-01-21 RX ADMIN — SODIUM BICARBONATE 650 MG: 650 TABLET ORAL at 17:59

## 2021-01-21 ASSESSMENT — ENCOUNTER SYMPTOMS
BLURRED VISION: 0
NAUSEA: 0
PALPITATIONS: 0
FALLS: 1
VOMITING: 0
WEAKNESS: 1
NERVOUS/ANXIOUS: 0
SORE THROAT: 0
SHORTNESS OF BREATH: 0
COUGH: 0
MYALGIAS: 1
BACK PAIN: 1
CONSTIPATION: 0
ABDOMINAL PAIN: 0
HEADACHES: 1
DEPRESSION: 0
DIARRHEA: 0
TINGLING: 1
DIZZINESS: 0
FEVER: 0
CHILLS: 0

## 2021-01-21 ASSESSMENT — PAIN DESCRIPTION - PAIN TYPE
TYPE: CHRONIC PAIN;ACUTE PAIN
TYPE: CHRONIC PAIN;ACUTE PAIN
TYPE: ACUTE PAIN;CHRONIC PAIN

## 2021-01-21 ASSESSMENT — ACTIVITIES OF DAILY LIVING (ADL): TOILETING: REQUIRES ASSIST

## 2021-01-21 ASSESSMENT — COGNITIVE AND FUNCTIONAL STATUS - GENERAL
SUGGESTED CMS G CODE MODIFIER DAILY ACTIVITY: CK
DRESSING REGULAR LOWER BODY CLOTHING: A LITTLE
HELP NEEDED FOR BATHING: A LITTLE
TOILETING: A LITTLE
DRESSING REGULAR UPPER BODY CLOTHING: A LITTLE
PERSONAL GROOMING: A LITTLE
EATING MEALS: A LITTLE
DAILY ACTIVITIY SCORE: 18

## 2021-01-21 NOTE — PROGRESS NOTES
"Chief Complaint   Patient presents with   • T-5000 FALL   • Head Injury       Problem List Items Addressed This Visit     None        Neurology Progress Note     History of present illness:  This is a 30-year old female; well known to our outpatient and inpatient neurology services, with PMhx CRION syndrome, transverse myelitis, disabled at baseline however ambulatory, bladder stimulator implanted, benign intracranial hypertension without papilledema, migraine, and suspected underlying psychiatric component of the above who presented to [facility] On 1/19/21 for a chief complaint of fall and subsequent BLE weakness.   The patient states that she felt to be in her usual state of healt last night; went to sleep. Around 0600, she reports that she rolled over and fell out of bed; she initially states that she fell onto her back/mid back, however then states that she \"bumped her head.\" At any rate, she had difficulty getting up, thus EMS was called. On arrival here, CT head unremarkable, CT C, T and L spine all without fracture or subluxations. Despite this, patient has persistent BLE weakness, worse than her baseline, prompting neurological consultation.   Currently patient is sitting up in bed; admits to persistent headache, \"all over,\" and endorses severe mid-back pain. She denies numbness/new numbness, admits to \"tingling\" to groin/saddle area; denies new problem with vision, speech or swallowing.     Neurology has been consulted by Dr. Valente Regalado to further evaluate the findings noted above.      Interval, 1/20/21:  Patient sitting up in bed; awake and alert, admits to diffuse pain. S/p CT myelogram this morning, awaiting results. Admits to mild improvement to BLE weakness, still with minimal antigravity movement. No events overnight.    Interval, 1/21/21:  Patient sitting up in bed; awake and alert. \"getting better and better.\" Admits to persistent severe pain to mid back and down BLE. No events " "overnight.     CT Myelogram-- negative/normal study.     No changes to HPI as was previously documented.     Past medical history:   Past Medical History:   Diagnosis Date   • Abdominal pain    • Anginal syndrome     random chest pain especially with tachycardia   • Apnea, sleep    • Arrhythmia     \"sinus tachycardia\", cariologist, Dr. Kumar; ablation 2/2016   • Arthritis     osteo   • ASTHMA     when around smoke   • Atrial fibrillation (HCC)    • Back pain    • Borderline personality disorder (HCC)    • Breath shortness     with tachycardia   • Bronchitis    • Cardiac arrhythmia    • Chickenpox    • Chronic obstructive pulmonary disease (HCC)    • Chronic UTI 9/18/2010   • Cough    • Daytime sleepiness    • Depression    • Diabetes (HCC)    • Diarrhea    • Disorder of thyroid    • Fall    • Fatigue    • Frequent headaches    • Gasping for breath    • Gynecological disorder     PCOS   • Headache(784.0)    • Heart burn    • History of falling    • Indigestion    • Migraine    • 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HCC)    • 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HCC)    • Nausea    • Obesity    • Other fatigue 6/29/2020   • Pain 08-15-12    back, 7/10   • Painful joint    • PCOS (polycystic ovarian syndrome)    • Pneumonia 2012   • Psychosis (HCC)    • Ringing in ears    • Scoliosis    • Shortness of breath    • Sinus tachycardia 10/31/2013   • Sleep apnea     CPAP \"pulmonary doctor took me off mid year 2016\"   • Snoring    • Tonsillitis    • 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HCC)    • Tuberculosis     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   • Urinary bladder disorder     Suprapubic cath   • Urinary incontinence    • Weakness    • Wears glasses        Past surgical history:   Past Surgical History:   Procedure Laterality Date   • MUSCLE BIOPSY Right 1/26/2017    Procedure: MUSCLE BIOPSY - THIGH;  Surgeon: Isidro Vigil M.D.;  Location: SURGERY [facility];  Service:    • GASTROSCOPY WITH BALLOON DILATATION N/A 1/4/2017    Procedure: " GASTROSCOPY WITH DILATATION;  Surgeon: Torres Vargas M.D.;  Location: SURGERY [facility];  Service:    • BOWEL STIMULATOR INSERTION  7/13/2016    Procedure: BOWEL STIMULATOR INSERTION FOR PERMANENT INTERSTIM SACRAL IMPLANT;  Surgeon: Joe Noyola M.D.;  Location: SURGERY [facility];  Service:    • RECOVERY  1/27/2016    Procedure: CATH LAB EP STUDY WITH SINUS NODE MODIFICATION ABHINAV;  Surgeon: [facility];  Location: SURGERY PRE-POST PROC UNIT [facility];  Service:    • OTHER CARDIAC SURGERY  1/2016    cardiac ablation   • NEURO DEST FACET L/S W/IG SNGL  4/21/2015    Performed by Reza Tabor at SURGERY [facility]   • LUMBAR FUSION ANTERIOR  8/21/2012    Performed by SUSIE SAWANT at SURGERY [facility] ORS   • ALYSSA BY LAPAROSCOPY  8/29/2010    Performed by SHAYY JOHNS at SURGERY [facility] ORS   • LAMINOTOMY     • OTHER ABDOMINAL SURGERY     • TONSILLECTOMY      tonsillectomy       Family history:   Family History   Problem Relation Age of Onset   • Hypertension Mother    • Sleep Apnea Mother    • Heart Disease Mother    • Other Mother         hypothryod   • Hypertension Maternal Uncle    • Heart Disease Maternal Grandmother    • Hypertension Maternal Grandmother    • No Known Problems Sister    • Other Sister         Narcolepsy;fibromyalsia;bone;nerve   • Genitourinary () Problems Sister         endometriosis       Social history:   Social History     Socioeconomic History   • Marital status: Single     Spouse name: Not on file   • Number of children: Not on file   • Years of education: Not on file   • Highest education level: Not on file   Occupational History   • Not on file   Social Needs   • Financial resource strain: Not hard at all   • Food insecurity     Worry: Never true     Inability: Never true   • Transportation needs     Medical: No     Non-medical: No   Tobacco Use   • Smoking status: Never Smoker   • Smokeless tobacco: Never Used   Substance and Sexual Activity   •  Alcohol use: No     Alcohol/week: 0.0 oz     Frequency: Never     Binge frequency: Never   • Drug use: Not Currently     Frequency: 7.0 times per week     Types: Marijuana   • Sexual activity: Not Currently     Birth control/protection: Pill   Lifestyle   • Physical activity     Days per week: Not on file     Minutes per session: Not on file   • Stress: Not on file   Relationships   • Social connections     Talks on phone: Not on file     Gets together: Not on file     Attends Synagogue service: Not on file     Active member of club or organization: Not on file     Attends meetings of clubs or organizations: Not on file     Relationship status: Not on file   • Intimate partner violence     Fear of current or ex partner: Not on file     Emotionally abused: Not on file     Physically abused: Not on file     Forced sexual activity: Not on file   Other Topics Concern   • Not on file   Social History Narrative    ** Merged History Encounter **            Current medications:   Current Facility-Administered Medications   Medication Dose   • diphenhydrAMINE (BENADRYL) tablet/capsule 25 mg  25 mg   • senna-docusate (PERICOLACE or SENOKOT S) 8.6-50 MG per tablet 2 Tab  2 Tab    And   • polyethylene glycol/lytes (MIRALAX) PACKET 1 Packet  1 Packet    And   • magnesium hydroxide (MILK OF MAGNESIA) suspension 30 mL  30 mL    And   • bisacodyl (DULCOLAX) suppository 10 mg  10 mg   • enoxaparin (LOVENOX) inj 40 mg  40 mg   • acetaminophen (Tylenol) tablet 650 mg  650 mg   • Pharmacy Consult Request ...Pain Management Review 1 Each  1 Each    And   • oxyCODONE immediate-release (ROXICODONE) tablet 2.5 mg  2.5 mg    And   • oxyCODONE immediate-release (ROXICODONE) tablet 5 mg  5 mg    And   • morphine (pf) 4 mg/mL injection 2 mg  2 mg   • ondansetron (ZOFRAN) syringe/vial injection 4 mg  4 mg   • ondansetron (ZOFRAN ODT) dispertab 4 mg  4 mg   • promethazine (PHENERGAN) tablet 12.5-25 mg  12.5-25 mg   • promethazine (PHENERGAN)  "suppository 12.5-25 mg  12.5-25 mg   • prochlorperazine (COMPAZINE) injection 5-10 mg  5-10 mg   • acetaZOLAMIDE SR (DIAMOX) capsule 500 mg  500 mg   • albuterol inhaler 2 Puff  2 Puff   • aspirin EC (ECOTRIN) tablet 81 mg  81 mg   • busPIRone (BUSPAR) tablet 10 mg  10 mg   • FLUoxetine (PROZAC) capsule 40 mg  40 mg   • omeprazole (PRILOSEC) capsule 40 mg  40 mg   • ipratropium-albuterol (DUONEB) nebulizer solution  3 mL   • ivabradine (Corlanor) tablet 7.5 mg  7.5 mg   • levothyroxine (SYNTHROID) tablet 100 mcg  100 mcg   • melatonin tablet 10 mg  10 mg   • methocarbamol (ROBAXIN) tablet 750 mg  750 mg   • mycophenolate (CELLCEPT) capsule 1,000 mg  1,000 mg   • OXcarbazepine (TRILEPTAL) tablet 300 mg  300 mg   • topiramate (TOPAMAX) tablet 50 mg  50 mg   • sodium bicarbonate tablet 650 mg  650 mg   • potassium chloride SA (Kdur) tablet 40 mEq  40 mEq   • traZODone (DESYREL) tablet 100 mg  100 mg   • ziprasidone (GEODON) capsule 40 mg  40 mg   • insulin regular (HumuLIN R,NovoLIN R) injection  2-9 Units    And   • glucose 4 g chewable tablet 16 g  16 g    And   • dextrose 50% (D50W) injection 50 mL  50 mL       Medication Allergy:  Allergies   Allergen Reactions   • Depakote [Divalproex Sodium] Unspecified     Muscle spasms/muscle pain and weakness     • Amitriptyline Unspecified     Headaches     • Aripiprazole [Abilify] Unspecified     Headaches/muscle twitching     • Clindamycin Nausea     Even with food     • Flagyl [Metronidazole Hcl] Unspecified     \"eye problems\"     • Flomax [Tamsulosin Hydrochloride] Swelling   • Levaquin Unspecified     Severe muscle cramps in legs  RXN=unknown   • Metformin Unspecified     Increased lactic acid      • Tape Rash     Tears skin off  coban with Tegaderm tape ok intermittently  RXN=ongoing   • Vancomycin Itching     Pt becomes flushed in face and chest.   RXN=7/10/16   • Wound Dressing Adhesive Hives     By pt report   • Ampicillin Rash     Pt reports that she received a rash "    • Ciprofloxacin    • Keflex Rash     Pt states she gets a rash with this medication  Tolerates ceftriaxone   • Levofloxacin Unspecified     Leg muscle cramps   • Metronidazole Rash     .   • Penicillins Hives   • Sulfa Drugs Myalgia     Muscle pain and weakness   • Tamsulosin Swelling   • Valproic Acid Rash     .       Review of systems:   Constitutional: denies fever, night sweats, weight loss.   Eyes: denies acute vision change, eye pain or secretion.   Ears, Nose, Mouth, Throat: denies nasal secretion, nasal bleeding, difficulty swallowing, hearing loss, tinnitus, vertigo, ear pain, acute dental problems, oral ulcers or lesions.   Endocrine: denies recent weight changes, heat or cold intolerance, polyuria, polydypsia, polyphagia,abnormal hair growth.  Cardiovascular: denies new onset of chest pain, palpitations, syncope, or dyspnea of exertion.  Pulmonary: denies shortness of breath, new onset of cough, hemoptysis, wheezing, chest pain or flu-like symptoms.   GI: denies nausea, vomiting, diarrhea, GI bleeding, change in appetite, abdominal pain, and change in bowel habits.  : denies dysuria, urinary incontinence, hematuria.  Heme/oncology: denies history of easy bruising or bleeding. No history of cancer, DVTor PE.  Allergy/immunology: denies hives/urticaria, or itching.   Dermatologic: denies new rash, or new skin lesions.  Musculoskeletal:denies joint swelling or pain, muscle pain, neck and back pain.   Neurologic: As noted above.   Psychiatric: denies symptoms of depression, anxiety, hallucinations, mood swings or changes, suicidal or homicidal thoughts.     Physical examination:   Vitals:    01/21/21 0000 01/21/21 0400 01/21/21 0800 01/21/21 1200   BP: 114/67 113/78 102/75 110/60   Pulse: 89 93 (!) 113 83   Resp: 18 16 18 20   Temp: 36 °C (96.8 °F) 36.2 °C (97.1 °F) 36.1 °C (97 °F) 36.6 °C (97.8 °F)   TempSrc: Temporal Temporal Temporal Temporal   SpO2: 91% 98% 96% 96%   Weight:       Height:          General: Patient in no acute distress, pleasant and cooperative.  HEENT: Normocephalic, no signs of acute trauma.   Neck: supple, no meningeal signs or carotid bruits. There is normal range of motion. No tenderness on exam.   Chest: clear to auscultation. No cough.   CV: RRR, no murmurs.   Skin: no signs of acute rashes or trauma.   Musculoskeletal: joints exhibit full range of motion, without any pain to palpation. There are no signs of joint or muscle swelling. There is no tenderness to deep palpation of muscles.   Psychiatric: No hallucinatory behavior. Denies symptoms of depression or suicidal ideation. Mood and affect appear normal on exam.     NEUROLOGICAL EXAM:   Mental status, orientation: Awake, alert and fully oriented.   Speech and language: speech is clear and fluent. The patient is able to name, repeat and comprehend.   Memory: There is intact recollection of recent and remote events.   Cranial nerve exam: Pupils are 3-4 mm bilaterally and equally reactive to light and accommodation. Visual fields are intact by confrontation. There is no nystagmus on primary or secondary gaze. Intact full EOM in all directions of gaze. Face appears symmetric. Sensation in the face is intact to light touch. Uvula is midline. Palate elevates symmetrically. Tongue is midline and without any signs of tongue biting or fasciculations. Shoulder shrug is intact bilaterally.   Motor exam: Strength is 5/5 in BUE; endorses pain with examination. 2/5 to BLE, with positive Morris test BL. Tone is normal. No abnormal movements were seen on exam.   Sensory exam Decreased/dull sensation to BLE (baseline); otherwise normal.   Deep tendon reflexes:  3 to BUE; areflexic to BLE. Plantar responses are mute. There is no clonus.   Coordination: shows a normal finger-nose-finger. Normal rapidly alternating movements.   Gait: Not assessed as patient is currently unable.       No significant changes to exam as was documented on 1/20/21.         ANCILLARY DATA REVIEWED:     Lab Data Review:  Recent Results (from the past 24 hour(s))   ACCU-CHEK GLUCOSE    Collection Time: 01/20/21  4:45 PM   Result Value Ref Range    Glucose - Accu-Ck 109 (H) 65 - 99 mg/dL   ACCU-CHEK GLUCOSE    Collection Time: 01/20/21  9:39 PM   Result Value Ref Range    Glucose - Accu-Ck 99 65 - 99 mg/dL   CBC WITH DIFFERENTIAL    Collection Time: 01/21/21  4:44 AM   Result Value Ref Range    WBC 3.3 (L) 4.8 - 10.8 K/uL    RBC 4.52 4.20 - 5.40 M/uL    Hemoglobin 13.2 12.0 - 16.0 g/dL    Hematocrit 41.6 37.0 - 47.0 %    MCV 92.0 81.4 - 97.8 fL    MCH 29.2 27.0 - 33.0 pg    MCHC 31.7 (L) 33.6 - 35.0 g/dL    RDW 47.5 35.9 - 50.0 fL    Platelet Count 162 (L) 164 - 446 K/uL    MPV 12.1 9.0 - 12.9 fL    Neutrophils-Polys 58.60 44.00 - 72.00 %    Lymphocytes 28.00 22.00 - 41.00 %    Monocytes 9.50 0.00 - 13.40 %    Eosinophils 3.00 0.00 - 6.90 %    Basophils 0.60 0.00 - 1.80 %    Immature Granulocytes 0.30 0.00 - 0.90 %    Nucleated RBC 0.00 /100 WBC    Neutrophils (Absolute) 1.92 (L) 2.00 - 7.15 K/uL    Lymphs (Absolute) 0.92 (L) 1.00 - 4.80 K/uL    Monos (Absolute) 0.31 0.00 - 0.85 K/uL    Eos (Absolute) 0.10 0.00 - 0.51 K/uL    Baso (Absolute) 0.02 0.00 - 0.12 K/uL    Immature Granulocytes (abs) 0.01 0.00 - 0.11 K/uL    NRBC (Absolute) 0.00 K/uL   Comp Metabolic Panel    Collection Time: 01/21/21  4:44 AM   Result Value Ref Range    Sodium 145 135 - 145 mmol/L    Potassium 4.0 3.6 - 5.5 mmol/L    Chloride 116 (H) 96 - 112 mmol/L    Co2 17 (L) 20 - 33 mmol/L    Anion Gap 12.0 7.0 - 16.0    Glucose 103 (H) 65 - 99 mg/dL    Bun 4 (L) 8 - 22 mg/dL    Creatinine 0.52 0.50 - 1.40 mg/dL    Calcium 9.1 8.5 - 10.5 mg/dL    AST(SGOT) 79 (H) 12 - 45 U/L    ALT(SGPT) 81 (H) 2 - 50 U/L    Alkaline Phosphatase 93 30 - 99 U/L    Total Bilirubin 0.5 0.1 - 1.5 mg/dL    Albumin 3.7 3.2 - 4.9 g/dL    Total Protein 5.7 (L) 6.0 - 8.2 g/dL    Globulin 2.0 1.9 - 3.5 g/dL    A-G Ratio 1.9 g/dL   MAGNESIUM     Collection Time: 01/21/21  4:44 AM   Result Value Ref Range    Magnesium 1.8 1.5 - 2.5 mg/dL   PHOSPHORUS    Collection Time: 01/21/21  4:44 AM   Result Value Ref Range    Phosphorus 3.9 2.5 - 4.5 mg/dL   ESTIMATED GFR    Collection Time: 01/21/21  4:44 AM   Result Value Ref Range    GFR If African American >60 >60 mL/min/1.73 m 2    GFR If Non African American >60 >60 mL/min/1.73 m 2   ACCU-CHEK GLUCOSE    Collection Time: 01/21/21 10:27 AM   Result Value Ref Range    Glucose - Accu-Ck 153 (H) 65 - 99 mg/dL       Labs reviewed by me.     Records reviewed:   Extensively reviewed patient's record/previous encounters at [facility].       Imaging reviewed by me:     DX-LUMBAR PUNCTURE FOR CT ONLY   Final Result      Successful fluoroscopic-guided lumbar puncture with intrathecal contrast administration for subsequent CT myelograms.      CT-CSPINE WITH PLUS RECONS   Final Result         CT-LSPINE WITH PLUS RECONS   Final Result      Lumbosacral junction facet arthropathy with left pars interarticular defect, right interarticularis sclerosis suggesting stress response. This does not result in foraminal or central stenosis      L4/5 anterior fusion is mature      Epidural lipomatosis caudally      CT-TSPINE WITH PLUS RECONS   Final Result      Mild facet arthropathy and ligamentum flavum redundancy results in slight dorsolateral and dorsal thecal sac indentation but no brandon stenosis is identified      Several mild anterior wedge compression deformities appear chronic      Multilevel Schmorl's node formation. No disc protrusion/extrusion is identified      CT-TSPINE W/O PLUS RECONS   Final Result      No fracture or subluxation is seen in the thoracic spine.      Multilevel Schmorl's nodes and degenerative changes.         CT-LSPINE W/O PLUS RECONS   Final Result      1.  No acute fracture or listhesis in the lumbar spine.   2.  Sequela of L4-5 anterior fusion, with no evidence of hardware complication.   3.  Nonobstructing  right nephrolithiasis.   4.  A 3.5 cm right adnexal cyst.      CT-HEAD W/O   Final Result      No acute intracranial abnormality is identified.      CT-CSPINE WITHOUT PLUS RECONS   Final Result      No acute fracture or listhesis in the cervical spine.            Modified Iron Scale (MRS): 2 = Slight disability; unable to perform all previous activities but able to look after own affairs without assistance      ASSESSMENT AND PLAN:  30-year old female; well known to our outpatient and inpatient neurology services, with PMhx CRION syndrome, transverse myelitis, disabled at baseline however ambulatory, bladder stimulator implanted, idiopathic intracranial hypertension without papilledema, migraine, and suspected underlying psychiatric component of the above who presented to [facility] On 1/19/21 for a chief complaint of fall and subsequent BLE weakness; on arrival here, CT head, C, T and L spine all unremarkable. Despite this, patient's BLE weakness/paralysis persists; somewhat improved today; still with diffuse pain and tingling to BLE/groin area. Patient unable to undergo MRI secondary to implanted bladder stimulator; thus, CT myelogram was pursed and completed; negative/normal study. Patient's exam is slightly improved today; given lack of findings per CT myelography, suspect functional etiology and/or mild soft tissue injury from fall.     Impression:   BLE weakness following a ground level fall; high suspicion for functional etiology.   Hx of transverse myelitis with decreased functional status at baseline.     Additional Recommendations/Plan:     -q4h and PRN neuro assessment. VS per nursing/unit protocol.   -PT/OT eval and treat. Physiatry consultation-- rehab versus skilled.   -Check UDS, ETOH-- both WNL. Note UA WNL.   -All other medical management (including conservative pain management) per primary team.    -DVT PPX: SCDs.     The plan of care above has been discussed with Dr. Rincon. Other than  the above, no further recommendations from a neurological perspective; please call with questions.     JERED MaynardRDIANE.  [facility]

## 2021-01-21 NOTE — PROGRESS NOTES
Hospital Medicine Daily Progress Note    Date of Service  1/21/2021    Chief Complaint  30 y.o. female admitted 1/19/2021 with fall    Hospital Course  Ms. Kristin Balderrama is a 30 y.o. female with history of transverse myelitis, paroxysmal atrial fibrillation status post ablation 2016, chronic urinary retention requiring suprapubic catheter with recurrent urinary tract infections and history of ESBL, sleep apnea, diabetes, asthma, benign intracranial hypertension without papilledema, migraines who presented on 1/19/2021 with fall.  Patient fell from her bed the morning of admission.  Patient states that she landed on her face and hyperextended her legs.  Reports after the fall she had decreased sensation and was unable to move her legs.  Also endorses recent decreased p.o. intake for the past 5 days.  On presentation CT head, cervicals, Thoracics, lumbar spine were unremarkable without fractures or subluxations.  Urine drug screen was negative.  Neurology was consulted.  As her bladder stimulator implant was not MRI compatible.  Status post lumbar puncture and CT myelogram on 1/20/2021 which were grossly unremarkable    Interval Problem Update  Patient was seen and examined at bedside.  I have personally reviewed vitals, labs, and imaging.    1/20.  Patient was hypothermic this morning.  Episode of hypotension overnight has resolved.  On room air.  Continue potassium supplementation.  Denies fever, chills, chest pains, shortness of breath.  She does report a headache.  Persistent lower extremity weakness and numbness.   1/21.  Afebrile.  Hypothermia has resolved.  Episodes of hypotension overnight.  On room air.  Elevated LFTs but denies abdominal pain.  Denies fever, chills, chest pains, shortness of breath.  Still reports weakness and decreased feeling of lower extremities.  Also reports difficulty swallowing    Consultants/Specialty  Neurology    Code Status  Full Code    Disposition  Medical clearance  OT  recommends home health.    Review of Systems  Review of Systems   Constitutional: Negative for chills and fever.   HENT: Negative for congestion and sore throat.    Eyes: Negative for blurred vision.   Respiratory: Negative for cough and shortness of breath.    Cardiovascular: Negative for chest pain, palpitations and leg swelling.   Gastrointestinal: Negative for abdominal pain, constipation, diarrhea, nausea and vomiting.   Genitourinary: Negative for dysuria, frequency and urgency.   Musculoskeletal: Positive for back pain, falls and myalgias.   Skin: Negative for rash.   Neurological: Positive for tingling, weakness and headaches. Negative for dizziness.   Psychiatric/Behavioral: Negative for depression. The patient is not nervous/anxious.    All other systems reviewed and are negative.       Physical Exam  Temp:  [36 °C (96.8 °F)-36.6 °C (97.9 °F)] 36.2 °C (97.1 °F)  Pulse:  [89-97] 93  Resp:  [16-18] 16  BP: ()/(50-78) 113/78  SpO2:  [90 %-98 %] 98 %    Physical Exam  Vitals signs and nursing note reviewed.   Constitutional:       General: She is not in acute distress.     Appearance: Normal appearance.   HENT:      Head: Normocephalic and atraumatic.      Nose: Nose normal.      Mouth/Throat:      Mouth: Mucous membranes are moist.      Pharynx: Oropharynx is clear. No oropharyngeal exudate or posterior oropharyngeal erythema.   Eyes:      Extraocular Movements: Extraocular movements intact.      Conjunctiva/sclera: Conjunctivae normal.   Neck:      Musculoskeletal: Normal range of motion and neck supple.   Cardiovascular:      Rate and Rhythm: Normal rate and regular rhythm.      Pulses: Normal pulses.      Heart sounds: Normal heart sounds. No murmur.   Pulmonary:      Effort: Pulmonary effort is normal. No respiratory distress.      Breath sounds: Normal breath sounds. No stridor. No wheezing or rales.   Chest:      Chest wall: Tenderness present.   Abdominal:      General: Abdomen is flat. Bowel  sounds are normal. There is no distension.      Palpations: Abdomen is soft. There is no mass.      Tenderness: There is no abdominal tenderness.   Genitourinary:     Comments: Suprapubic Andrade  Musculoskeletal:         General: Tenderness (Reports tenderness everywhere palpated, but states cannot feel anything below waist.  ) present.   Skin:     General: Skin is warm.      Capillary Refill: Capillary refill takes less than 2 seconds.   Neurological:      Mental Status: She is alert and oriented to person, place, and time. Mental status is at baseline.      Cranial Nerves: No cranial nerve deficit.      Sensory: Sensory deficit (Reports dullness to lower extremities but this is symmetric encompassing the entire circumference of lower extremities.) present.      Motor: Weakness (Weakness of bilateral lower extremities) present.   Psychiatric:         Mood and Affect: Mood normal.         Behavior: Behavior normal.         Fluids    Intake/Output Summary (Last 24 hours) at 1/21/2021 0827  Last data filed at 1/20/2021 2233  Gross per 24 hour   Intake 360 ml   Output --   Net 360 ml       Laboratory  Recent Labs     01/19/21  1504 01/20/21  0533 01/21/21  0444   WBC 4.2* 3.7* 3.3*   RBC 4.34 4.26 4.52   HEMOGLOBIN 12.5 12.6 13.2   HEMATOCRIT 40.0 39.0 41.6   MCV 92.2 91.5 92.0   MCH 28.8 29.6 29.2   MCHC 31.3* 32.3* 31.7*   RDW 46.9 47.2 47.5   PLATELETCT 175 148* 162*   MPV 12.5 11.9 12.1     Recent Labs     01/19/21  1504 01/20/21  0533 01/21/21  0444   SODIUM 140 139 145   POTASSIUM 3.9 3.3* 4.0   CHLORIDE 115* 110 116*   CO2 15* 16* 17*   GLUCOSE 83 99 103*   BUN 4* 6* 4*   CREATININE 0.62 0.59 0.52   CALCIUM 8.8 8.6 9.1     Recent Labs     01/20/21  0533   APTT 28.5   INR 1.05         Recent Labs     01/20/21  0533   TRIGLYCERIDE 130   HDL 48   LDL 98       Imaging  DX-LUMBAR PUNCTURE FOR CT ONLY   Final Result      Successful fluoroscopic-guided lumbar puncture with intrathecal contrast administration for  subsequent CT myelograms.      CT-CSPINE WITH PLUS RECONS   Final Result         CT-LSPINE WITH PLUS RECONS   Final Result      Lumbosacral junction facet arthropathy with left pars interarticular defect, right interarticularis sclerosis suggesting stress response. This does not result in foraminal or central stenosis      L4/5 anterior fusion is mature      Epidural lipomatosis caudally      CT-TSPINE WITH PLUS RECONS   Final Result      Mild facet arthropathy and ligamentum flavum redundancy results in slight dorsolateral and dorsal thecal sac indentation but no brandon stenosis is identified      Several mild anterior wedge compression deformities appear chronic      Multilevel Schmorl's node formation. No disc protrusion/extrusion is identified      CT-TSPINE W/O PLUS RECONS   Final Result      No fracture or subluxation is seen in the thoracic spine.      Multilevel Schmorl's nodes and degenerative changes.         CT-LSPINE W/O PLUS RECONS   Final Result      1.  No acute fracture or listhesis in the lumbar spine.   2.  Sequela of L4-5 anterior fusion, with no evidence of hardware complication.   3.  Nonobstructing right nephrolithiasis.   4.  A 3.5 cm right adnexal cyst.      CT-HEAD W/O   Final Result      No acute intracranial abnormality is identified.      CT-CSPINE WITHOUT PLUS RECONS   Final Result      No acute fracture or listhesis in the cervical spine.           Assessment/Plan  * Weakness of both lower extremities  Assessment & Plan  Unknown etiology at this point with history of transverse myelitis, conversion disorder?  After fall and has back pain, chronic urinary retention and diarrhea  CT scan with myelogram is ordered per neurology  Blood work ordered per neurology  PT OT eval  Pain control    Diabetes mellitus type 2 in obese (HCC)- (present on admission)  Assessment & Plan  Lab Results   Component Value Date/Time    HBA1C 5.1 10/09/2020 1243    HBA1C 5.0 08/15/2020 0502    HBA1C 6.0 (H)  08/29/2019 1427     Results from last 7 days   Lab Units 01/21/21  1027 01/20/21  2139 01/20/21  1645 01/20/21  1224 01/20/21  0635 01/19/21  2326 01/15/21  2111   ACCU CHECK GLUCOSE 788 mg/dL 153* 99 109* 105* 99 96 86     I have ordered insulin sliding scale with D50 and glucagon for hypoglycemia per protocol.  Diabetic diet  Diabetic education    Idiopathic intracranial hypertension- (present on admission)  Assessment & Plan  On Diamox    History of supraventricular tachycardia- (present on admission)  Assessment & Plan  Ivabradine prescribed by cardiology    History of optic neuritis- (present on admission)  Assessment & Plan  Continue home CellCept    Mild intermittent asthma without complication- (present on admission)  Assessment & Plan  Not in exacerbation  Continue home inhalers    Hypothyroidism- (present on admission)  Assessment & Plan  Continue home Synthroid    Anxiety- (present on admission)  Assessment & Plan  Continue home medications buspirone, fluoxetine       VTE prophylaxis: Enoxaparin

## 2021-01-21 NOTE — THERAPY
"Occupational Therapy   Initial Evaluation     Patient Name: Kristin Balderrama  Age:  30 y.o., Sex:  female  Medical Record #: [id number]  Today's Date: 1/21/2021     Precautions  Precautions: Fall Risk  Comments: history of psychogenic/conversion disorder    Assessment  Patient is 30 y.o. female with a medical history of transverse mellitus, proximal A. fib status post ablation 2016, chronic urinary retention, repeated UTIs with history of ESBL, sleep apnea, diabetes, and asthma. Patient presents with fluctuating performance in UE and LE strength and functional use. She reports that she has some decreased sensation in B LE. She required moderate assistance to sit EOB with her legs and trunk, which is inconsistant with PTs note from yesterday stating that she was supervision. She was able to perform sliding board and squat pivot transfer with min A using contact guard for safety due to history of behavior from previous admit. Her grandmother is home to help her as needed with ADLs. No further acute care OT needs are anticipated. Home health is recommended for her to help with ADL performance and functional mobility in the home.       Plan    Patient will not be actively followed for occupational therapy services at this time, however may be seen if requested by physician for 1 more visit within 30 days to address any discharge or equipment needs.     DC Equipment Recommendations: None  Discharge Recommendations: Recommend home health for continued occupational therapy services     Subjective    \"My grandma helps me with what I need\"     Objective       01/21/21 0900   Initial Contact Note    Initial Contact Note Order Received and Verified, Occupational Therapy Evaluation in Progress with Full Report to Follow.   Prior Living Situation   Prior Services Continuous (24 Hour) Care Giving Family  (lives with her grandma who helps her with ADLs)   Housing / Facility 1 [address]   Steps Into Home 0  (ramp for wheelchair) "   Steps In Home 0   Bathroom Set up Bathtub / Shower Combination;Grab Bars;Tub Transfer Bench   Equipment Owned Wheelchair;Tub Transfer Bench;Grab Bar(s) In Tub / Shower;Grab Bar(s) By Toilet;Slide Board;Bed Side Commode  (toilet aid)   Lives with - Patient's Self Care Capacity Other (Comments)  (grandmother)   Comments her grandmother provides her with ADL support as needed, she does not work and is home with her all the time   Prior Level of ADL Function   Self Feeding Independent   Grooming / Hygiene Independent   Bathing Requires Assist   Dressing Requires Assist   Toileting Requires Assist  (has suprapubic catheter, also has toilet aid for BM)   Comments Her grandmother helps her with bathing and dressing as needed   Prior Level of IADL Function   Prior Level Of Mobility Uses Wheel Chair for Community Mobility;Uses Wheel Chair for in Home Mobility   Occupation (Pre-Hospital Vocational) Not Employed   Comments Grandmother helps her with IADLs   History of Falls   History of Falls Yes   Date of Last Fall 01/19/21   Precautions   Precautions Fall Risk   Pain 0 - 10 Group   Therapist Pain Assessment Nurse Notified;0   Non Verbal Descriptors   Non Verbal Scale  Calm   Cognition    Cognition / Consciousness X   Level of Consciousness Alert   Comments pleasant but self limiting at times   Passive ROM Upper Body   Passive ROM Upper Body X   Comments inconsistent findings between formal testing and functional observations; performed ADLs but demonstrated limited shoulder flexion during testing   Active ROM Upper Body   Active ROM Upper Body  WDL   Comments inconsistent findings between formal testing and functional observation   Strength Upper Body   Upper Body Strength  X   Comments inconsistent findings between formal testing and functional observation, functional test, inable to move arms fully against gravity but used arms to aid in sliding board transfer to w/c   Sensation Upper Body   Upper Extremity Sensation   "WDL   Upper Body Muscle Tone   Upper Body Muscle Tone  WDL   Neurological Concerns   Neurological Concerns No   Coordination Upper Body   Coordination WDL   Balance Assessment   Sitting Balance (Static) Good   Sitting Balance (Dynamic) Good   Standing Balance (Static) Poor +  (didn't stand fully for transfer; assessed during squat pivot)   Standing Balance (Dynamic) Poor  (did not stand fully, assessed during squat pivot)   Weight Shift Sitting Good   Weight Shift Standing Poor   Comments performed slideboard to w/c and squat pivot transfer to/from commode and w/c with contact guard for safety   Bed Mobility    Supine to Sit Moderate Assist  (assistance with legs/trunk; inconsistent with PT note 1/20)   Sit to Supine Minimal Assist  (contact guard)   Scooting Supervised  (for safety)   ADL Assessment   Grooming Supervision   Toileting Maximal Assist  (contact guard for safety, also has suprapubic catheter)   Comments pt reports grandma helps her with dressing at home. required maxA for BM pericare today however pt reports using \"a stick\" at home to wipe   Functional Mobility   Bed, Chair, Wheelchair Transfer Minimal Assist  (contact guard for safety)   Toilet Transfers Minimal Assist  (contact guard for safety)   Transfer Method Slide Board;Squat Pivot  (slide board from bed to w/c; squat pivot from w/c to commode)   Wheelchair Assist Supervised  (for safety going through doorways)   Distance Wheelchair (Feet or Distance) 10 ft   Visual Perception   Visual Perception  WDL   Activity Tolerance   Sitting in Chair 44 min   Sitting Edge of Bed 5 min   Standing <1 min  (squat pivot transfer to and from commode)   Patient / Family Goals   Patient / Family Goal #1 to go home   Short Term Goals   Short Term Goal # 1 pt will d/c with appropriate needs met   Education Group   Education Provided Transfers;Role of Occupational Therapist;Activities of Daily Living;Wheelchair Safety   Role of Occupational Therapist Patient " Response Patient;Acceptance;Explanation;Verbal Demonstration   Transfers Patient Response Patient;Acceptance;Explanation;Demonstration;Verbal Demonstration;Action Demonstration   ADL Patient Response Patient;Acceptance;Demonstration;Action Demonstration;Verbal Demonstration;Explanation   Wheelchair Safety Patient Response Patient;Acceptance;Explanation;Verbal Demonstration  (to avoid the door frame)   Problem List   Problem List None  (fluctuating performance, appears to be near baseline)   Anticipated Discharge Equipment and Recommendations   DC Equipment Recommendations None   Discharge Recommendations Recommend home health for continued occupational therapy services   Interdisciplinary Plan of Care Collaboration   IDT Collaboration with  Nursing   Patient Position at End of Therapy Seated;Chair Alarm On;Call Light within Reach;Tray Table within Reach;Phone within Reach  (in wheelchair with locked wheels)   Collaboration Comments report given

## 2021-01-22 VITALS
TEMPERATURE: 97.8 F | WEIGHT: 238.1 LBS | BODY MASS INDEX: 39.67 KG/M2 | OXYGEN SATURATION: 95 % | SYSTOLIC BLOOD PRESSURE: 106 MMHG | RESPIRATION RATE: 16 BRPM | HEIGHT: 65 IN | HEART RATE: 84 BPM | DIASTOLIC BLOOD PRESSURE: 55 MMHG

## 2021-01-22 LAB
ALB CSF/SERPL: 5.1 RATIO (ref 0–9)
ALBUMIN CSF-MCNC: 19 MG/DL (ref 0–35)
ALBUMIN SERPL BCP-MCNC: 3.9 G/DL (ref 3.2–4.9)
ALBUMIN SERPL-MCNC: 3760 MG/DL (ref 3500–5200)
ALBUMIN/GLOB SERPL: 2.6 G/DL
ALP SERPL-CCNC: 88 U/L (ref 30–99)
ALT SERPL-CCNC: 58 U/L (ref 2–50)
ANION GAP SERPL CALC-SCNC: 10 MMOL/L (ref 7–16)
ARSENIC BLD-MCNC: <10 UG/L
AST SERPL-CCNC: 37 U/L (ref 12–45)
BASOPHILS # BLD AUTO: 0.8 % (ref 0–1.8)
BASOPHILS # BLD: 0.03 K/UL (ref 0–0.12)
BILIRUB SERPL-MCNC: 0.4 MG/DL (ref 0.1–1.5)
BUN SERPL-MCNC: 5 MG/DL (ref 8–22)
C GATTII+NEOFOR DNA CSF QL NAA+NON-PROBE: NOT DETECTED
CADMIUM BLD-MCNC: <1 UG/L
CALCIUM SERPL-MCNC: 8.9 MG/DL (ref 8.5–10.5)
CHLORIDE SERPL-SCNC: 110 MMOL/L (ref 96–112)
CMV DNA CSF QL NAA+NON-PROBE: NOT DETECTED
CO2 SERPL-SCNC: 17 MMOL/L (ref 20–33)
CREAT SERPL-MCNC: 0.6 MG/DL (ref 0.5–1.4)
E COLI K1 DNA CSF QL NAA+NON-PROBE: NOT DETECTED
EOSINOPHIL # BLD AUTO: 0.13 K/UL (ref 0–0.51)
EOSINOPHIL NFR BLD: 3.7 % (ref 0–6.9)
ERYTHROCYTE [DISTWIDTH] IN BLOOD BY AUTOMATED COUNT: 47.5 FL (ref 35.9–50)
EV RNA CSF QL NAA+NON-PROBE: NOT DETECTED
GLOBULIN SER CALC-MCNC: 1.5 G/DL (ref 1.9–3.5)
GLUCOSE BLD-MCNC: 93 MG/DL (ref 65–99)
GLUCOSE SERPL-MCNC: 103 MG/DL (ref 65–99)
GP B STREP DNA CSF QL NAA+NON-PROBE: NOT DETECTED
HAEM INFLU DNA CSF QL NAA+NON-PROBE: NOT DETECTED
HAV IGM SERPL QL IA: NORMAL
HBV CORE IGM SER QL: NORMAL
HBV SURFACE AG SER QL: NORMAL
HCT VFR BLD AUTO: 40.6 % (ref 37–47)
HCV AB SER QL: NORMAL
HGB BLD-MCNC: 12.8 G/DL (ref 12–16)
HHV6 DNA CSF QL NAA+NON-PROBE: NOT DETECTED
HSV1 DNA CSF QL NAA+NON-PROBE: NOT DETECTED
HSV2 DNA CSF QL NAA+NON-PROBE: NOT DETECTED
IGG CSF-MCNC: <0.9 MG/DL (ref 0–6)
IGG SERPL-MCNC: 108 MG/DL (ref 768–1632)
IGG SYNTH RATE SER+CSF CALC-MRATE: ABNORMAL MG/D
IGG/ALB CLEAR SER+CSF-RTO: ABNORMAL RATIO (ref 0.28–0.66)
IGG/ALB CSF: ABNORMAL RATIO (ref 0.09–0.25)
IMM GRANULOCYTES # BLD AUTO: 0.01 K/UL (ref 0–0.11)
IMM GRANULOCYTES NFR BLD AUTO: 0.3 % (ref 0–0.9)
L MONOCYTOG DNA CSF QL NAA+NON-PROBE: NOT DETECTED
LEAD BLDV-MCNC: <2 UG/DL
LYMPHOCYTES # BLD AUTO: 1.26 K/UL (ref 1–4.8)
LYMPHOCYTES NFR BLD: 35.7 % (ref 22–41)
MAGNESIUM SERPL-MCNC: 1.7 MG/DL (ref 1.5–2.5)
MCH RBC QN AUTO: 29 PG (ref 27–33)
MCHC RBC AUTO-ENTMCNC: 31.5 G/DL (ref 33.6–35)
MCV RBC AUTO: 92.1 FL (ref 81.4–97.8)
MERCURY BLD-MCNC: <2.5 UG/L
MONOCYTES # BLD AUTO: 0.35 K/UL (ref 0–0.85)
MONOCYTES NFR BLD AUTO: 9.9 % (ref 0–13.4)
N MEN DNA CSF QL NAA+NON-PROBE: NOT DETECTED
NEUTROPHILS # BLD AUTO: 1.75 K/UL (ref 2–7.15)
NEUTROPHILS NFR BLD: 49.6 % (ref 44–72)
NRBC # BLD AUTO: 0 K/UL
NRBC BLD-RTO: 0 /100 WBC
NUCLEAR IGG SER QL IA: NORMAL
PARECHOVIRUS A RNA CSF QL NAA+NON-PROBE: NOT DETECTED
PHOSPHATE SERPL-MCNC: 3.7 MG/DL (ref 2.5–4.5)
PLATELET # BLD AUTO: 175 K/UL (ref 164–446)
PMV BLD AUTO: 12.5 FL (ref 9–12.9)
POTASSIUM SERPL-SCNC: 3.6 MMOL/L (ref 3.6–5.5)
PROT SERPL-MCNC: 5.4 G/DL (ref 6–8.2)
RBC # BLD AUTO: 4.41 M/UL (ref 4.2–5.4)
S PNEUM DNA CSF QL NAA+NON-PROBE: NOT DETECTED
SODIUM SERPL-SCNC: 137 MMOL/L (ref 135–145)
VDRL CSF QL: NON REACTIVE
VDRL CSF QL: NON REACTIVE
VZV DNA CSF QL NAA+NON-PROBE: NOT DETECTED
WBC # BLD AUTO: 3.5 K/UL (ref 4.8–10.8)

## 2021-01-22 PROCEDURE — 700102 HCHG RX REV CODE 250 W/ 637 OVERRIDE(OP): Performed by: INTERNAL MEDICINE

## 2021-01-22 PROCEDURE — 86255 FLUORESCENT ANTIBODY SCREEN: CPT | Mod: 91

## 2021-01-22 PROCEDURE — 83735 ASSAY OF MAGNESIUM: CPT

## 2021-01-22 PROCEDURE — 83520 IMMUNOASSAY QUANT NOS NONAB: CPT

## 2021-01-22 PROCEDURE — 83519 RIA NONANTIBODY: CPT | Mod: 91

## 2021-01-22 PROCEDURE — 82962 GLUCOSE BLOOD TEST: CPT

## 2021-01-22 PROCEDURE — 700111 HCHG RX REV CODE 636 W/ 250 OVERRIDE (IP): Performed by: HOSPITALIST

## 2021-01-22 PROCEDURE — A9270 NON-COVERED ITEM OR SERVICE: HCPCS | Performed by: INTERNAL MEDICINE

## 2021-01-22 PROCEDURE — 99239 HOSP IP/OBS DSCHRG MGMT >30: CPT | Performed by: INTERNAL MEDICINE

## 2021-01-22 PROCEDURE — 700102 HCHG RX REV CODE 250 W/ 637 OVERRIDE(OP): Performed by: HOSPITALIST

## 2021-01-22 PROCEDURE — 36415 COLL VENOUS BLD VENIPUNCTURE: CPT

## 2021-01-22 PROCEDURE — 80074 ACUTE HEPATITIS PANEL: CPT

## 2021-01-22 PROCEDURE — A9270 NON-COVERED ITEM OR SERVICE: HCPCS | Performed by: HOSPITALIST

## 2021-01-22 PROCEDURE — 85025 COMPLETE CBC W/AUTO DIFF WBC: CPT

## 2021-01-22 PROCEDURE — 80053 COMPREHEN METABOLIC PANEL: CPT

## 2021-01-22 PROCEDURE — 84100 ASSAY OF PHOSPHORUS: CPT

## 2021-01-22 RX ORDER — OXYCODONE HYDROCHLORIDE 5 MG/1
5 TABLET ORAL EVERY 6 HOURS PRN
Qty: 20 TAB | Refills: 0 | Status: SHIPPED | OUTPATIENT
Start: 2021-01-22 | End: 2021-01-27

## 2021-01-22 RX ADMIN — ASPIRIN 81 MG: 81 TABLET, COATED ORAL at 05:23

## 2021-01-22 RX ADMIN — ONDANSETRON 4 MG: 2 INJECTION INTRAMUSCULAR; INTRAVENOUS at 04:55

## 2021-01-22 RX ADMIN — ZIPRASIDONE HYDROCHLORIDE 40 MG: 40 CAPSULE ORAL at 05:23

## 2021-01-22 RX ADMIN — POTASSIUM CHLORIDE 40 MEQ: 1500 TABLET, EXTENDED RELEASE ORAL at 05:24

## 2021-01-22 RX ADMIN — MYCOPHENOLATE MOFETIL 1000 MG: 250 CAPSULE ORAL at 05:23

## 2021-01-22 RX ADMIN — SODIUM BICARBONATE 650 MG: 650 TABLET ORAL at 05:24

## 2021-01-22 RX ADMIN — OXYCODONE 5 MG: 5 TABLET ORAL at 12:57

## 2021-01-22 RX ADMIN — MORPHINE SULFATE 2 MG: 4 INJECTION INTRAVENOUS at 04:55

## 2021-01-22 RX ADMIN — OXYCODONE 5 MG: 5 TABLET ORAL at 09:39

## 2021-01-22 RX ADMIN — OXYCODONE 5 MG: 5 TABLET ORAL at 00:35

## 2021-01-22 RX ADMIN — IVABRADINE 7.5 MG: 7.5 TABLET, FILM COATED ORAL at 09:38

## 2021-01-22 RX ADMIN — FLUOXETINE 40 MG: 20 CAPSULE ORAL at 05:24

## 2021-01-22 RX ADMIN — OMEPRAZOLE 40 MG: 20 CAPSULE, DELAYED RELEASE ORAL at 09:37

## 2021-01-22 RX ADMIN — BUSPIRONE HYDROCHLORIDE 10 MG: 10 TABLET ORAL at 05:24

## 2021-01-22 RX ADMIN — ACETAZOLAMIDE 500 MG: 500 CAPSULE, EXTENDED RELEASE ORAL at 05:23

## 2021-01-22 RX ADMIN — ENOXAPARIN SODIUM 40 MG: 40 INJECTION SUBCUTANEOUS at 05:25

## 2021-01-22 RX ADMIN — TOPIRAMATE 50 MG: 25 TABLET, FILM COATED ORAL at 05:24

## 2021-01-22 RX ADMIN — Medication 400 MG: at 09:37

## 2021-01-22 RX ADMIN — OXCARBAZEPINE 300 MG: 300 TABLET, FILM COATED ORAL at 05:23

## 2021-01-22 RX ADMIN — OXYCODONE 5 MG: 5 TABLET ORAL at 16:49

## 2021-01-22 RX ADMIN — BUSPIRONE HYDROCHLORIDE 10 MG: 10 TABLET ORAL at 12:57

## 2021-01-22 RX ADMIN — LEVOTHYROXINE SODIUM 100 MCG: 0.1 TABLET ORAL at 05:24

## 2021-01-22 ASSESSMENT — PAIN DESCRIPTION - PAIN TYPE
TYPE: ACUTE PAIN;CHRONIC PAIN
TYPE: ACUTE PAIN
TYPE: ACUTE PAIN

## 2021-01-22 NOTE — DISCHARGE PLANNING
Follow up for post acute services limiting diagnosis for IRF level of care. Therapy notes reviewed, current plan home with outpatient support. No physiatry consult ordered per protocol. May benefit from outpatient physiatry follow up  with Dr. Wise. [id number].

## 2021-01-22 NOTE — DISCHARGE INSTRUCTIONS
Discharge Instructions    Discharged to home by car with friend. Discharged via wheelchair, hospital escort: Yes.  Special equipment needed: None    Be sure to schedule a follow-up appointment with your primary care doctor or any specialists as instructed.     Discharge Plan:        I understand that a diet low in cholesterol, fat, and sodium is recommended for good health. Unless I have been given specific instructions below for another diet, I accept this instruction as my diet prescription.   Other diet: Low fiber / soft    Special Instructions: None    · Is patient discharged on Warfarin / Coumadin?   No     Depression / Suicide Risk    As you are discharged from this [facility] facility, it is important to learn how to keep safe from harming yourself.    Recognize the warning signs:  · Abrupt changes in personality, positive or negative- including increase in energy   · Giving away possessions  · Change in eating patterns- significant weight changes-  positive or negative  · Change in sleeping patterns- unable to sleep or sleeping all the time   · Unwillingness or inability to communicate  · Depression  · Unusual sadness, discouragement and loneliness  · Talk of wanting to die  · Neglect of personal appearance   · Rebelliousness- reckless behavior  · Withdrawal from people/activities they love  · Confusion- inability to concentrate     If you or a loved one observes any of these behaviors or has concerns about self-harm, here's what you can do:  · Talk about it- your feelings and reasons for harming yourself  · Remove any means that you might use to hurt yourself (examples: pills, rope, extension cords, firearm)  · Get professional help from the community (Mental Health, Substance Abuse, psychological counseling)  · Do not be alone:Call your Safe Contact- someone whom you trust who will be there for you.  · Call your local CRISIS HOTLINE 328-1044 or 937-864-3842  · Call your local Children's Mobile Crisis  Response Team [facility] (209) 751-3313 or www.[employer]  · Call the toll free National Suicide Prevention Hotlines   · National Suicide Prevention Lifeline 808-679-RJMI (7220)  · National Hope Line Network 800-SUICIDE (691-1555)      Discharge Instructions per ANTONI [email or web address]    DIET: Diet Order Diet: Low Fiber(GI Soft)    ACTIVITY: As tolerated    A proper diet that is low in grease, fat, and salt, along with 30 minutes of exercise per day will lead to weight loss, and better controlled blood sugar and blood pressure.    DIAGNOSIS: Weakness of both lower extremities    Follow up with your Primary Care Provider Sue Preston M.D. as scheduled or sooner if your symptoms persist or worsen.  Return to Emergency Room for sever chest pain, shortness of breath, signs of a stroke, or any other emergencies.

## 2021-01-22 NOTE — DISCHARGE PLANNING
Received Choice form at 1210  Agency/Facility Name: [facility]  Referral sent per Choice form @ [phone]  Ag[facility] Name: [facility]  Spoke to: Katelyn  Outcome: Pt accepted to resume services. [facility] shared that pt will d/c this afternoon.

## 2021-01-22 NOTE — PROGRESS NOTES
Brief PM&R Note    -Discussed with patient that she is not a candidate for inpatient rehab at this time, due to not having an applicable rehab diagnosis  -Recommended SNF vs. HH, she stated understanding  -Placed referral to outpatient PM&R Clinic visit with Dr. Frandy Ko MD  Physical Medicine and Rehabilitation   1/22/2021

## 2021-01-22 NOTE — DISCHARGE SUMMARY
Discharge Summary    CHIEF COMPLAINT ON ADMISSION  Chief Complaint   Patient presents with   • T-5000 FALL   • Head Injury       Reason for Admission  T-5000 Fall     Admission Date  1/19/2021    CODE STATUS  Full Code    HPI & HOSPITAL COURSE  Ms. Kristin Balderrama is a 30 y.o. female with history of transverse myelitis, paroxysmal atrial fibrillation status post ablation 2016, chronic urinary retention requiring suprapubic catheter with recurrent urinary tract infections and history of ESBL, sleep apnea, diabetes, asthma, benign intracranial hypertension without papilledema, migraines who presented on 1/19/2021 with fall.  Patient fell from her bed the morning of admission.  Patient states that she landed on her face and hyperextended her legs.  Reports after the fall she had decreased sensation and was unable to move her legs.  Also endorses recent decreased p.o. intake for the past 5 days.  On presentation CT head, cervicals, Thoracics, lumbar spine were unremarkable without fractures or subluxations.  Urine drug screen was negative.  Neurology was consulted.  As her bladder stimulator implant was not MRI compatible.  Status post lumbar puncture and CT myelogram on 1/20/2021 which were grossly unremarkable.  Extremity weakness likely secondary to fall with baseline disability with transverse myelitis.  PT/OT recommended home with home health.  Patient is medically stable for discharge home.      Therefore, she is discharged in fair and stable condition to home with organized home healthcare and close outpatient follow-up.    The patient met 2-midnight criteria for an inpatient stay at the time of discharge.    Discharge Date  1/22/2021    FOLLOW UP ITEMS POST DISCHARGE  None    DISCHARGE DIAGNOSES  Principal Problem:    Weakness of both lower extremities POA: Yes  Active Problems:    Diabetes mellitus type 2 in obese (HCC) POA: Yes    Mild intermittent asthma without complication POA: Yes      Overview: when  around smoke    History of optic neuritis POA: Yes    History of supraventricular tachycardia POA: Yes    Idiopathic intracranial hypertension POA: Yes    Anxiety POA: Yes    Hypothyroidism (Chronic) POA: Yes  Resolved Problems:    * No resolved hospital problems. *      FOLLOW UP  Future Appointments   Date Time Provider Department Center   1/27/2021  9:30 AM [facility]   2/4/2021  8:40 AM JACOB [facility] [address]   2/17/2021  9:00 AM La Lutz M.D. [facility] [address] M.D.  [address]  [address]  [facility] [id number] [phone]    In 2 weeks  As needed, If symptoms worsen      MEDICATIONS ON DISCHARGE     Medication List      START taking these medications      Instructions   oxyCODONE immediate-release 5 MG Tabs  Commonly known as: ROXICODONE   Take 1 Tab by mouth every 6 hours as needed for Severe Pain for up to 5 days.  Dose: 5 mg        CHANGE how you take these medications      Instructions   busPIRone 10 MG Tabs tablet  What changed: Another medication with the same name was removed. Continue taking this medication, and follow the directions you see here.  Commonly known as: BUSPAR   Take 10 mg by mouth 3 times a day.  Dose: 10 mg     calcium carbonate 750 MG chewable tablet  What changed:   · when to take this  · reasons to take this  Commonly known as: TUMS EX   Take 2 Tabs by mouth every day.  Dose: 1,500 mg     levothyroxine 100 MCG Tabs  What changed: Another medication with the same name was removed. Continue taking this medication, and follow the directions you see here.  Commonly known as: SYNTHROID   Take 100 mcg by mouth Every morning on an empty stomach.  Dose: 100 mcg     traZODone 100 MG Tabs  What changed: when to take this  Commonly known as: DESYREL   Take 1 Tab by mouth every bedtime.  Dose: 100 mg     Trulicity 3 MG/0.5ML Sopn  What changed: when to take this  Generic drug: Dulaglutide   Inject 3 mg under the skin  every 7 days.  Dose: 3 mg     ziprasidone 40 MG Caps  What changed:   · how much to take  · how to take this  · when to take this  Commonly known as: GEODON   Take 1 tablet by mouth twice a day        CONTINUE taking these medications      Instructions   acetaminophen 500 MG Tabs  Commonly known as: TYLENOL   Take 1,000 mg by mouth every 6 hours as needed for Moderate Pain.  Dose: 1,000 mg     acetaZOLAMIDE  MG Cp12  Commonly known as: DIAMOX   Take 500 mg by mouth 2 times a day.  Dose: 500 mg     albuterol 108 (90 Base) MCG/ACT Aers inhalation aerosol   Inhale 2 Puffs every 6 hours as needed for Shortness of Breath.  Dose: 2 Puff     aspirin EC 81 MG Tbec  Commonly known as: ECOTRIN   Take 81 mg by mouth every day.  Dose: 81 mg     Corlanor 7.5 MG Tabs tablet  Generic drug: ivabradine   Take 7.5 mg by mouth 2 times a day, with meals.  Dose: 7.5 mg     fluoxetine 40 MG capsule  Commonly known as: PROZAC   Take 1 Cap by mouth every day.  Dose: 40 mg     insulin glargine 100 UNIT/ML Soln  Commonly known as: Lantus   Inject 8 Units as instructed every evening.  Dose: 8 Units     ipratropium-albuterol 0.5-2.5 (3) MG/3ML nebulizer solution  Commonly known as: DUONEB   Take 3 mL by nebulization every four hours as needed for Shortness of Breath. Nebulizer  Dose: 3 mL     Melatonin 10 MG Tabs   Take 10 mg by mouth every bedtime.  Dose: 10 mg     methocarbamol 750 MG Tabs  Commonly known as: ROBAXIN   Take 750 mg by mouth every bedtime. Indications: Musculoskeletal Pain  Dose: 750 mg     mycophenolate 500 MG tablet  Commonly known as: CellCept   Take 2 Tabs by mouth 2 times a day.  Dose: 1,000 mg     Myrbetriq 50 MG Tb24  Generic drug: Mirabegron ER   Take 50 mg by mouth every day.  Dose: 50 mg     NexIUM 40 MG Pack  Generic drug: esomeprazole magnesium   Take 40 mg by mouth every morning before breakfast.  Dose: 40 mg     ondansetron 4 MG Tbdp  Commonly known as: ZOFRAN ODT   Take 1 Tab by mouth every 6 hours as  "needed for Nausea.  Dose: 4 mg     OXcarbazepine 300 MG Tabs  Commonly known as: Trileptal   Take 1 Tab by mouth 2 times a day.  Dose: 300 mg     oxybutynin 5 MG Tabs  Commonly known as: DITROPAN   Take 1 Tab by mouth 3 times a day.  Dose: 5 mg     potassium chloride 20 MEQ Pack  Commonly known as: KLOR-CON   Take 2 Packets by mouth 2 times a day.  Dose: 40 mEq     sodium bicarbonate 325 MG Tabs   Take 650 mg by mouth 2 times a day.  Dose: 650 mg     topiramate 50 MG tablet  Commonly known as: TOPAMAX   Take 50 mg by mouth 2 times a day.  Dose: 50 mg     vitamin D (Ergocalciferol) 1.25 MG (85388 UT) Caps capsule  Commonly known as: DRISDOL   Take 50,000 Units by mouth every 7 days.  Dose: 50,000 Units        STOP taking these medications    NS SOLN 100 mL with meropenem 500 MG SOLR 1,000 mg     omeprazole 20 MG delayed-release capsule  Commonly known as: PRILOSEC     polyethylene glycol 3350 17 GM/SCOOP Powd  Commonly known as: MiraLax            Allergies  Allergies   Allergen Reactions   • Depakote [Divalproex Sodium] Unspecified     Muscle spasms/muscle pain and weakness     • Amitriptyline Unspecified     Headaches     • Aripiprazole [Abilify] Unspecified     Headaches/muscle twitching     • Clindamycin Nausea     Even with food     • Flagyl [Metronidazole Hcl] Unspecified     \"eye problems\"     • Flomax [Tamsulosin Hydrochloride] Swelling   • Levaquin Unspecified     Severe muscle cramps in legs  RXN=unknown   • Metformin Unspecified     Increased lactic acid      • Tape Rash     Tears skin off  coban with Tegaderm tape ok intermittently  RXN=ongoing   • Vancomycin Itching     Pt becomes flushed in face and chest.   RXN=7/10/16   • Wound Dressing Adhesive Hives     By pt report   • Ampicillin Rash     Pt reports that she received a rash    • Ciprofloxacin    • Keflex Rash     Pt states she gets a rash with this medication  Tolerates ceftriaxone   • Levofloxacin Unspecified     Leg muscle cramps   • " Metronidazole Rash     .   • Penicillins Hives   • Sulfa Drugs Myalgia     Muscle pain and weakness   • Tamsulosin Swelling   • Valproic Acid Rash     .       DIET  Orders Placed This Encounter   Procedures   • Diet Order Diet: Low Fiber(GI Soft)     Standing Status:   Standing     Number of Occurrences:   1     Order Specific Question:   Diet:     Answer:   Low Fiber(GI Soft) [2]       ACTIVITY  As tolerated.  Weight bearing as tolerated    CONSULTATIONS  Neurology    PROCEDURES  None    LABORATORY  Lab Results   Component Value Date    SODIUM 137 01/22/2021    POTASSIUM 3.6 01/22/2021    CHLORIDE 110 01/22/2021    CO2 17 (L) 01/22/2021    GLUCOSE 103 (H) 01/22/2021    BUN 5 (L) 01/22/2021    CREATININE 0.60 01/22/2021    CREATININE 0.75 (L) 07/20/2010    GLOMRATE >59 07/20/2010        Lab Results   Component Value Date    WBC 3.5 (L) 01/22/2021    WBC 6.1 07/20/2010    HEMOGLOBIN 12.8 01/22/2021    HEMATOCRIT 40.6 01/22/2021    PLATELETCT 175 01/22/2021        I discussed medications and side effects with the patient.  I discussed prognosis and importance of medical compliance with the patient.  I counseled the patient about diet, exercise, weight loss, smoking cessation, and life style modifications.  All questions and concerns have been addressed.  Total time of the discharge process was 36 minutes.

## 2021-01-22 NOTE — PROGRESS NOTES
Monitor summary: SR 72-90, MO 0.12, QRS 0.08, QT 0.44, with rare PVCs per strip from monitor room.

## 2021-01-22 NOTE — DISCHARGE PLANNING
Anticipated Discharge Disposition: Home with HH    Action: Spoke with the patient at the bedside about HH choices. The patient picked [facility].    Choice form faxed [place] Griselda LANIER at [phone].    Barriers to Discharge: HH acceptance    Plan: Follow up with HH.

## 2021-01-22 NOTE — FACE TO FACE
Face to Face Supporting Documentation - Home Health    The encounter with this patient was in whole or in part the primary reason for home health admission.    Date of encounter:   Patient:                    MRN:                       YOB: 2022  Kristin Balderrama  [id number]  1990     Home health to see patient for:  Skilled Nursing care for assessment, interventions & education    Skilled need for:  Exacerbation of Chronic Disease State Transverse myelitis, fall      Homebound status evidenced by:  Needs the assistance of another person in order to leave the home. Leaving home requires a considerable and taxing effort. There is a normal inability to leave the home.    Community Physician to provide follow up care: Sue Preston M.D.         I certify the face to face encounter for this home health care referral meets the CMS requirements and the encounter/clinical assessment with the patient was, in whole, or in part, for the medical condition(s) listed above, which is the primary reason for home health care. Based on my clinical findings: the service(s) are medically necessary, support the need for home health care, and the homebound criteria are met.  I certify that this patient has had a face to face encounter by myself.  Noman Grant D.O. - NPI: [id number]

## 2021-01-23 ENCOUNTER — APPOINTMENT (OUTPATIENT)
Dept: ADMISSIONS | Facility: MEDICAL CENTER | Age: 31
End: 2021-01-23
Payer: MEDICARE

## 2021-01-23 LAB
ALB CSF/SERPL: 5 RATIO (ref 0–9)
ALBUMIN CSF-MCNC: 18 MG/DL (ref 0–35)
ALBUMIN SERPL-MCNC: 3580 MG/DL (ref 3500–5200)
IGG CSF-MCNC: <0.9 MG/DL (ref 0–6)
IGG SERPL-MCNC: 114 MG/DL (ref 768–1632)
IGG SYNTH RATE SER+CSF CALC-MRATE: ABNORMAL MG/D
IGG/ALB CLEAR SER+CSF-RTO: ABNORMAL RATIO (ref 0.28–0.66)
IGG/ALB CSF: ABNORMAL RATIO (ref 0.09–0.25)
OLIGOCLONAL BANDS CSF ELPH-IMP: ABNORMAL
OLIGOCLONAL BANDS CSF ELPH-IMP: NEGATIVE
OLIGOCLONAL BANDS CSF IEF: 0 BANDS (ref 0–1)
TEST NAME 95000: NORMAL

## 2021-01-23 NOTE — PROGRESS NOTES
IV removed. Discharge paperwork discussed. All questions answered. Follow up appointment discussed. Patient stated she has all personal belongings.

## 2021-01-24 LAB
ALBUMIN SERPL ELPH-MCNC: 3.37 G/DL (ref 3.75–5.01)
ALPHA1 GLOB SERPL ELPH-MCNC: 0.27 G/DL (ref 0.19–0.46)
ALPHA2 GLOB SERPL ELPH-MCNC: 0.68 G/DL (ref 0.48–1.05)
B-GLOBULIN SERPL ELPH-MCNC: 0.55 G/DL (ref 0.48–1.1)
GAMMA GLOB SERPL ELPH-MCNC: 0.14 G/DL (ref 0.62–1.51)
IGA SERPL-MCNC: 8 MG/DL (ref 68–408)
IGG SERPL-MCNC: 119 MG/DL (ref 768–1632)
IGM SERPL-MCNC: 6 MG/DL (ref 35–263)
INTERPRETATION SERPL IFE-IMP: ABNORMAL
MONOCLON BAND OBS SERPL: ABNORMAL
PATHOLOGY STUDY: ABNORMAL
PROT SERPL-MCNC: 5 G/DL (ref 6.3–8.2)
SPECIMEN SOURCE: NORMAL
VZV DNA SPEC QL NAA+PROBE: NOT DETECTED

## 2021-01-25 ENCOUNTER — PATIENT OUTREACH (OUTPATIENT)
Dept: HEALTH INFORMATION MANAGEMENT | Facility: OTHER | Age: 31
End: 2021-01-25

## 2021-01-25 LAB
HSV DNA SPEC QL NAA+PROBE: NOT DETECTED
SPECIMEN SOURCE: NORMAL

## 2021-01-25 NOTE — PROGRESS NOTES
Community Health Worker Intake  • Social determinates of health intake Complete.   • Identified barriers to none.  • Contact information provided to Kristin Balderrama   • Has PCP appointment scheduled   • Outpatient assessment completed.  • Did the patient receive medications post discharge: Yes    [facility] Carlee called pt to introduce CCM program. Pt identifies no barriers to resources. Pt expressed no needs at this time.     Plan: CHW will d/c pt from CCM team

## 2021-01-26 ENCOUNTER — APPOINTMENT (OUTPATIENT)
Dept: RADIOLOGY | Facility: MEDICAL CENTER | Age: 31
End: 2021-01-26
Attending: EMERGENCY MEDICINE
Payer: MEDICARE

## 2021-01-26 ENCOUNTER — HOSPITAL ENCOUNTER (EMERGENCY)
Facility: MEDICAL CENTER | Age: 31
End: 2021-01-26
Attending: EMERGENCY MEDICINE
Payer: MEDICARE

## 2021-01-26 VITALS
WEIGHT: 238.1 LBS | BODY MASS INDEX: 39.67 KG/M2 | SYSTOLIC BLOOD PRESSURE: 117 MMHG | HEART RATE: 84 BPM | DIASTOLIC BLOOD PRESSURE: 68 MMHG | OXYGEN SATURATION: 93 % | TEMPERATURE: 98.6 F | RESPIRATION RATE: 16 BRPM | HEIGHT: 65 IN

## 2021-01-26 DIAGNOSIS — T83.511A URINARY TRACT INFECTION ASSOCIATED WITH INDWELLING URETHRAL CATHETER, INITIAL ENCOUNTER (HCC): ICD-10-CM

## 2021-01-26 DIAGNOSIS — R10.30 LOWER ABDOMINAL PAIN: ICD-10-CM

## 2021-01-26 DIAGNOSIS — N39.0 URINARY TRACT INFECTION ASSOCIATED WITH INDWELLING URETHRAL CATHETER, INITIAL ENCOUNTER (HCC): ICD-10-CM

## 2021-01-26 LAB
ALBUMIN SERPL BCP-MCNC: 3.8 G/DL (ref 3.2–4.9)
ALBUMIN/GLOB SERPL: 2.2 G/DL
ALP SERPL-CCNC: 68 U/L (ref 30–99)
ALT SERPL-CCNC: 28 U/L (ref 2–50)
ANION GAP SERPL CALC-SCNC: 11 MMOL/L (ref 7–16)
APPEARANCE UR: ABNORMAL
AST SERPL-CCNC: 20 U/L (ref 12–45)
BACTERIA #/AREA URNS HPF: ABNORMAL /HPF
BASOPHILS # BLD AUTO: 0.8 % (ref 0–1.8)
BASOPHILS # BLD: 0.03 K/UL (ref 0–0.12)
BILIRUB SERPL-MCNC: 0.3 MG/DL (ref 0.1–1.5)
BILIRUB UR QL STRIP.AUTO: NEGATIVE
BUN SERPL-MCNC: 6 MG/DL (ref 8–22)
CALCIUM SERPL-MCNC: 9.3 MG/DL (ref 8.5–10.5)
CHLORIDE SERPL-SCNC: 113 MMOL/L (ref 96–112)
CO2 SERPL-SCNC: 17 MMOL/L (ref 20–33)
COLOR UR: YELLOW
CREAT SERPL-MCNC: 0.57 MG/DL (ref 0.5–1.4)
EOSINOPHIL # BLD AUTO: 0.16 K/UL (ref 0–0.51)
EOSINOPHIL NFR BLD: 4.5 % (ref 0–6.9)
EPI CELLS #/AREA URNS HPF: NEGATIVE /HPF
ERYTHROCYTE [DISTWIDTH] IN BLOOD BY AUTOMATED COUNT: 44.5 FL (ref 35.9–50)
GLOBULIN SER CALC-MCNC: 1.7 G/DL (ref 1.9–3.5)
GLUCOSE SERPL-MCNC: 95 MG/DL (ref 65–99)
GLUCOSE UR STRIP.AUTO-MCNC: NEGATIVE MG/DL
HCG SERPL QL: NEGATIVE
HCT VFR BLD AUTO: 39.4 % (ref 37–47)
HGB BLD-MCNC: 12.6 G/DL (ref 12–16)
HYALINE CASTS #/AREA URNS LPF: ABNORMAL /LPF
IMM GRANULOCYTES # BLD AUTO: 0.01 K/UL (ref 0–0.11)
IMM GRANULOCYTES NFR BLD AUTO: 0.3 % (ref 0–0.9)
KETONES UR STRIP.AUTO-MCNC: ABNORMAL MG/DL
LACTATE BLD-SCNC: 1.1 MMOL/L (ref 0.5–2)
LEUKOCYTE ESTERASE UR QL STRIP.AUTO: ABNORMAL
LIPASE SERPL-CCNC: 41 U/L (ref 11–82)
LYMPHOCYTES # BLD AUTO: 1.34 K/UL (ref 1–4.8)
LYMPHOCYTES NFR BLD: 37.6 % (ref 22–41)
MCH RBC QN AUTO: 29 PG (ref 27–33)
MCHC RBC AUTO-ENTMCNC: 32 G/DL (ref 33.6–35)
MCV RBC AUTO: 90.8 FL (ref 81.4–97.8)
MICRO URNS: ABNORMAL
MONOCYTES # BLD AUTO: 0.37 K/UL (ref 0–0.85)
MONOCYTES NFR BLD AUTO: 10.4 % (ref 0–13.4)
NEUTROPHILS # BLD AUTO: 1.65 K/UL (ref 2–7.15)
NEUTROPHILS NFR BLD: 46.4 % (ref 44–72)
NITRITE UR QL STRIP.AUTO: NEGATIVE
NRBC # BLD AUTO: 0 K/UL
NRBC BLD-RTO: 0 /100 WBC
PH UR STRIP.AUTO: 7.5 [PH] (ref 5–8)
PLATELET # BLD AUTO: 161 K/UL (ref 164–446)
PMV BLD AUTO: 12.6 FL (ref 9–12.9)
POTASSIUM SERPL-SCNC: 3.4 MMOL/L (ref 3.6–5.5)
PROT SERPL-MCNC: 5.5 G/DL (ref 6–8.2)
PROT UR QL STRIP: 100 MG/DL
RBC # BLD AUTO: 4.34 M/UL (ref 4.2–5.4)
RBC # URNS HPF: ABNORMAL /HPF
RBC UR QL AUTO: ABNORMAL
SODIUM SERPL-SCNC: 141 MMOL/L (ref 135–145)
SP GR UR STRIP.AUTO: 1.02
UROBILINOGEN UR STRIP.AUTO-MCNC: 1 MG/DL
WBC # BLD AUTO: 3.6 K/UL (ref 4.8–10.8)
WBC #/AREA URNS HPF: ABNORMAL /HPF

## 2021-01-26 PROCEDURE — 700117 HCHG RX CONTRAST REV CODE 255: Performed by: EMERGENCY MEDICINE

## 2021-01-26 PROCEDURE — 99285 EMERGENCY DEPT VISIT HI MDM: CPT

## 2021-01-26 PROCEDURE — 80053 COMPREHEN METABOLIC PANEL: CPT

## 2021-01-26 PROCEDURE — 83690 ASSAY OF LIPASE: CPT

## 2021-01-26 PROCEDURE — 87077 CULTURE AEROBIC IDENTIFY: CPT

## 2021-01-26 PROCEDURE — 83605 ASSAY OF LACTIC ACID: CPT

## 2021-01-26 PROCEDURE — 96375 TX/PRO/DX INJ NEW DRUG ADDON: CPT

## 2021-01-26 PROCEDURE — 85025 COMPLETE CBC W/AUTO DIFF WBC: CPT

## 2021-01-26 PROCEDURE — 81001 URINALYSIS AUTO W/SCOPE: CPT

## 2021-01-26 PROCEDURE — 74177 CT ABD & PELVIS W/CONTRAST: CPT

## 2021-01-26 PROCEDURE — 96374 THER/PROPH/DIAG INJ IV PUSH: CPT

## 2021-01-26 PROCEDURE — 84703 CHORIONIC GONADOTROPIN ASSAY: CPT

## 2021-01-26 PROCEDURE — 87086 URINE CULTURE/COLONY COUNT: CPT

## 2021-01-26 PROCEDURE — 96365 THER/PROPH/DIAG IV INF INIT: CPT

## 2021-01-26 PROCEDURE — 87186 SC STD MICRODIL/AGAR DIL: CPT

## 2021-01-26 PROCEDURE — 700105 HCHG RX REV CODE 258: Performed by: EMERGENCY MEDICINE

## 2021-01-26 PROCEDURE — 87040 BLOOD CULTURE FOR BACTERIA: CPT

## 2021-01-26 PROCEDURE — 700111 HCHG RX REV CODE 636 W/ 250 OVERRIDE (IP): Performed by: EMERGENCY MEDICINE

## 2021-01-26 RX ORDER — CEFDINIR 300 MG/1
300 CAPSULE ORAL 2 TIMES DAILY
Qty: 10 CAP | Refills: 0 | Status: SHIPPED | OUTPATIENT
Start: 2021-01-26 | End: 2021-01-31

## 2021-01-26 RX ORDER — MORPHINE SULFATE 10 MG/ML
6 INJECTION, SOLUTION INTRAMUSCULAR; INTRAVENOUS ONCE
Status: COMPLETED | OUTPATIENT
Start: 2021-01-26 | End: 2021-01-26

## 2021-01-26 RX ORDER — ONDANSETRON 2 MG/ML
4 INJECTION INTRAMUSCULAR; INTRAVENOUS ONCE
Status: COMPLETED | OUTPATIENT
Start: 2021-01-26 | End: 2021-01-26

## 2021-01-26 RX ORDER — SODIUM CHLORIDE 9 MG/ML
1000 INJECTION, SOLUTION INTRAVENOUS ONCE
Status: COMPLETED | OUTPATIENT
Start: 2021-01-26 | End: 2021-01-26

## 2021-01-26 RX ADMIN — ONDANSETRON 4 MG: 2 INJECTION INTRAMUSCULAR; INTRAVENOUS at 01:50

## 2021-01-26 RX ADMIN — CEFTRIAXONE SODIUM 1 G: 1 INJECTION, POWDER, FOR SOLUTION INTRAMUSCULAR; INTRAVENOUS at 03:14

## 2021-01-26 RX ADMIN — MORPHINE SULFATE 6 MG: 10 INJECTION INTRAVENOUS at 01:50

## 2021-01-26 RX ADMIN — SODIUM CHLORIDE 1000 ML: 9 INJECTION, SOLUTION INTRAVENOUS at 01:50

## 2021-01-26 RX ADMIN — IOHEXOL 100 ML: 350 INJECTION, SOLUTION INTRAVENOUS at 03:00

## 2021-01-26 ASSESSMENT — FIBROSIS 4 INDEX: FIB4 SCORE: 0.86

## 2021-01-26 NOTE — ED PROVIDER NOTES
"ED Provider Note    CHIEF COMPLAINT  Chief Complaint   Patient presents with   • Abdominal Pain     pt has suprapubic catheter that was last changed on 12/27, states since then she has notice a decrease in urine output and \"goopy\" around the catheter. pt states today her pain became worse, 9/10.     HPI  Patient is a 30-year-old female with extensive past medical history including recurrent UTIs, suprapubic catheter placement secondary to transverse myelitis.  Presents emergency room today for evaluation of abdominal discomfort, decreased urine output from her suprapubic catheter and excessive drainage around the catheter site.  This pain has become increasingly worse, is going to severe intensity today.  His pain is localized to the area and she associates it with a foul smell and excessive drainage in the gauze dressings.  She states that since his pain has been ramping up in intensity she has been unable to have a bowel movement.  She has chronic neurologic dysfunction secondary to her chronic debilitating illness however this is an acute new process for her.  She states her indwelling catheter was last changed/exchanged 1 month ago.    PPE Note: I personally donned full PPE for all patient encounters during this visit, including being clean-shaven with an N95 respirator mask, gloves, and goggles.     Past admission note from 1/22 shows history of paroxysmal A. fib status post ablation, bladder stimulator implant, presented to the ER previously and admitted for injury sustained from a fall and decreased sensations in her legs.  During her hospitalization neurology was following, she had a lumbar puncture and CT myelogram which were unremarkable.  It was attributed that her extremity weakness was likely secondary to her baseline disabilities secondary to the transverse myelitis.    REVIEW OF SYSTEMS  Constitutional: No fevers, chills, or recent illness.  Skin: No rashes or diaphoresis.  Eyes: No change in vision, " no discharge.  ENT: No hearing change. No rhinorrhea or nasal congestion, no ST or difficulty swallowing.  Respiratory: No SOB. No coughing or hemoptysis. No Wheezing.  Cardiac: No CP, palpitations, edema. No PND or orthopnea.  GI: as above, no N/V; diarrhea, constipation. No blood in stool.  : as above. Denies vaginal complaints.  MSK: No pain in joints or muscles. No calf pain or swelling.  Neuro: No HA or paresthesias. No focal weakness.  Endocrine: No polyuria or polydipsia. No heat or cold intolerance.  Heme: No easy bruising. No history of bleeding disorders or anemia.    PAST MEDICAL HISTORY   has a past medical history of Abdominal pain, Anginal syndrome, Apnea, sleep, Arrhythmia, Arthritis, ASTHMA, Atrial fibrillation ([facility]), Back pain, Borderline personality disorder ([facility]), Breath shortness, Bronchitis, Cardiac arrhythmia, Chickenpox, Chronic obstructive pulmonary disease ([facility]), Chronic UTI (9/18/2010), Cough, Daytime sleepiness, Depression, Diabetes ([facility]), Diarrhea, Disorder of thyroid, Fall, Fatigue, Frequent headaches, Gasping for breath, Gynecological disorder, Headache(784.0), Heart burn, History of falling, Indigestion, Migraine, 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    SOCIAL HISTORY  Social History     Tobacco Use   • Smoking status: Never Smoker   • Smokeless tobacco: Never Used   Substance and Sexual Activity   • Alcohol use: No     Alcohol/week: 0.0 oz     Frequency: Never     Binge frequency: Never   • Drug use: Not Currently     Frequency: 7.0 times per week     Types: Marijuana   • Sexual  activity: Not Currently     Birth control/protection: Pill       SURGICAL HISTORY   has a past surgical history that includes neuro dest facet l/s w/ig sngl (4/21/2015); recovery (1/27/2016); [place] by laparoscopy (8/29/2010); lumbar fusion anterior (8/21/2012); other cardiac surgery (1/2016); tonsillectomy; bowel stimulator insertion (7/13/2016); gastroscopy with balloon dilatation (N/A, 1/4/2017); muscle biopsy (Right, 1/26/2017); other abdominal surgery; and laminotomy.    CURRENT MEDICATIONS  Home Medications     Reviewed by Ruth Ann Moore R.N. (Registered Nurse) on 01/26/21 at 0029  Med List Status: Partial   Medication Last Dose Status   acetaminophen (TYLENOL) 500 MG Tab  Active   acetaZOLAMIDE SR (DIAMOX) 500 MG CAPSULE SR 12 HR  Active   albuterol 108 (90 Base) MCG/ACT Aero Soln inhalation aerosol  Active   aspirin EC (ECOTRIN) 81 MG Tablet Delayed Response  Active   busPIRone (BUSPAR) 10 MG Tab tablet  Active   calcium carbonate (TUMS EX) 750 MG chewable tablet  Active   Dulaglutide (TRULICITY) 3 MG/0.5ML Solution Pen-injector  Active   esomeprazole magnesium (NEXIUM) 40 MG Pack  Active   fluoxetine (PROZAC) 40 MG capsule  Active   insulin glargine (LANTUS) 100 UNIT/ML Solution  Active   ipratropium-albuterol (DUONEB) 0.5-2.5 (3) MG/3ML nebulizer solution  Active   ivabradine (CORLANOR) 7.5 MG Tab tablet  Active   levothyroxine (SYNTHROID) 100 MCG Tab  Active   Melatonin 10 MG Tab  Active   methocarbamol (ROBAXIN) 750 MG Tab  Active   Mirabegron ER (MYRBETRIQ) 50 MG TABLET SR 24 HR  Active   mycophenolate (CELLCEPT) 500 MG tablet  Active   ondansetron (ZOFRAN ODT) 4 MG TABLET DISPERSIBLE  Active   OXcarbazepine (TRILEPTAL) 300 MG Tab  Active   oxybutynin (DITROPAN) 5 MG Tab  Active   oxyCODONE immediate-release (ROXICODONE) 5 MG Tab  Active   potassium chloride (KLOR-CON) 20 MEQ Pack  Active   sodium bicarbonate 325 MG Tab  Active   topiramate (TOPAMAX) 50 MG tablet  Active   traZODone (DESYREL) 100  "MG Tab  Active   vitamin D, Ergocalciferol, (DRISDOL) 1.25 MG (22732 UT) Cap capsule  Active   ziprasidone (GEODON) 40 MG Cap  Active              ALLERGIES  Allergies   Allergen Reactions   • Depakote [Divalproex Sodium] Unspecified     Muscle spasms/muscle pain and weakness     • Amitriptyline Unspecified     Headaches     • Aripiprazole [Abilify] Unspecified     Headaches/muscle twitching     • Clindamycin Nausea     Even with food     • Flagyl [Metronidazole Hcl] Unspecified     \"eye problems\"     • Flomax [Tamsulosin Hydrochloride] Swelling   • Levaquin Unspecified     Severe muscle cramps in legs  RXN=unknown   • Metformin Unspecified     Increased lactic acid      • Tape Rash     Tears skin off  coban with Tegaderm tape ok intermittently  RXN=ongoing   • Vancomycin Itching     Pt becomes flushed in face and chest.   RXN=7/10/16   • Wound Dressing Adhesive Hives     By pt report   • Ampicillin Rash     Pt reports that she received a rash    • Ciprofloxacin    • Keflex Rash     Pt states she gets a rash with this medication  Tolerates ceftriaxone   • Levofloxacin Unspecified     Leg muscle cramps   • Metronidazole Rash     .   • Penicillins Hives   • Sulfa Drugs Myalgia     Muscle pain and weakness   • Tamsulosin Swelling   • Valproic Acid Rash     .     PHYSICAL EXAM  VITAL SIGNS: /70   Pulse 77   Temp 36.9 °C (98.4 °F) (Temporal)   Resp 16   Ht 1.651 m (5' 5\")   Wt 108 kg (238 lb 1.6 oz)   SpO2 96%   BMI 39.62 kg/m²   Pulse ox interpretation: I interpret this pulse ox as normal.  Genl: F lying in gurney uncomfortably, speaking clearly, appears in very mild distress   Head: NC/AT   ENT: Mucous membranes moist, posterior pharynx clear, uvula midline, nares patent bilaterally   Eyes: Normal sclera, pupils equal round reactive to light  Neck: Supple, FROM, no LAD appreciated  Pulmonary: Lungs are clear to auscultation bilaterally  Chest: No TTP  CV:  RRR, no murmur appreciated, pulses 2+ in both " upper and lower extremities,  Abdomen: soft, global and inconsistent lower abdominal tenderness to palpation, no distention, no rebound/+guarding in the periumbilical/left lower quadrant space, no masses palpated, no HSM   : no CVA tenderness .  Suprapubic site has erythema, warmth and discharge upon compression with foul smell.  Suprapubic catheter has excessive amounts of sediment and cloudy contents.  Musculoskeletal: Pain free ROM of the neck. Moving upper and lower extremities and spontaneous in coordinated fashion  Neuro: A&Ox4 (person, place, time, situation), speech fluent, gait not assessed.  Pt has chronic weakness and debilitation from prior injuries.  No gross neurologic changes  Psych: Patient has an appropriate affect and behavior  Skin: No rash or lesions.  No pallor or jaundice.  No cyanosis.  Warm and dry.     DIAGNOSTIC STUDIES / PROCEDURES    LABS  Labs Reviewed   CBC WITH DIFFERENTIAL - Abnormal; Notable for the following components:       Result Value    WBC 3.6 (*)     MCHC 32.0 (*)     Platelet Count 161 (*)     Neutrophils (Absolute) 1.65 (*)     All other components within normal limits   COMP METABOLIC PANEL - Abnormal; Notable for the following components:    Potassium 3.4 (*)     Chloride 113 (*)     Co2 17 (*)     Bun 6 (*)     Total Protein 5.5 (*)     Globulin 1.7 (*)     All other components within normal limits   URINALYSIS,CULTURE IF INDICATED - Abnormal; Notable for the following components:    Character Turbid (*)     Ketones Trace (*)     Protein 100 (*)     Leukocyte Esterase Large (*)     Occult Blood Large (*)     All other components within normal limits    Narrative:     Indication for culture:->Patient WITHOUT an indwelling Andrade  catheter in place with new onset of Dysuria, Frequency,  Urgency, and/or Suprapubic pain   URINE MICROSCOPIC (W/UA) - Abnormal; Notable for the following components:    WBC  (*)     RBC 10-20 (*)     Bacteria Moderate (*)     Hyaline Cast  "3-5 (*)     All other components within normal limits    Narrative:     Indication for culture:->Patient WITHOUT an indwelling Andrade  catheter in place with new onset of Dysuria, Frequency,  Urgency, and/or Suprapubic pain   LIPASE   LACTIC ACID   BLOOD CULTURE    Narrative:     Per Hospital Policy: Only change Specimen Src: to \"Line\" if  specified by physician order.   HCG QUAL SERUM   ESTIMATED GFR   URINE CULTURE(NEW)    Narrative:     Indication for culture:->Patient WITHOUT an indwelling Andrade  catheter in place with new onset of Dysuria, Frequency,  Urgency, and/or Suprapubic pain   BLOOD CULTURE     RADIOLOGY  CT-ABDOMEN-PELVIS WITH   Final Result         1.  Small fluid collection and hazy fat stranding adjacent to the right ovary, nonspecific. Could represent early inflammatory or infectious process versus physiologic fluid.   2.  Appendicolith at the base the appendix, the visualized portions of the appendix appear unremarkable, however the appendix is incompletely visualized. Appendicolith places patient at risk for development of appendicitis.   3.  Right ovarian cyst, follow-up pelvic sonogram in 6 weeks for evaluation of stability and further characterization as clinically appropriate.          COURSE & MEDICAL DECISION MAKING  Pertinent Labs & Imaging studies reviewed. (See chart for details)    DDX:  UTI, pyelitis, pyelonephritis, vesicular colonic fistula, abscess, colitis    MDM    Initial evaluation at 1245:  Patient presents emergency room for symptoms as described above.  The patient has been chronically ill for some time, and now presents for purulent drainage coming from the suprapubic cath site.  The suprapubic catheter has been switched, copiously irrigated and urinalysis has been sent.  This appears to the acutely infected with multiple WBCs, minimal RBCs, moderate bacteria and large amounts of leukocyte esterase.  Question will be if this is a chronic colonization as patient has been " treated for chronic UTIs and has had multiple positive cultures in the past.  While the patient has some nonspecific abdominal pains this is inconsistent on repeat exam the patient had not received any medications and had no tenderness, distention and vital signs showed no tachycardia or hypotension she remains afebrile during the entirety of her hospital visit.  She has slight leukopenia, a reassuring lactate and no other gross metabolic abnormalities.  I did discuss case with the on-call urologist, who does agree that based on clinical findings does not appear the patient likely has underlying abscess, chronic colonization is likely and he would recommend treating with cefdinir for short course.  I do not believe that she has clinical evidence of pyelonephritis.  CT of the abdomen pelvis with contrast was obtained to rule out any possible vesicular fistulas.  This is not apparent the patient does have some nonspecific fat stranding adjacent to the right ovary.  She does not have tenderness over this area and may represent early inflammatory or infectious process.  Acutely I believe the urine is the acute source and she will be treated with 5 days of oral cefdinir.  Per urology recommendations she will follow-up in urology clinic for increased urinary catheter care, 2-week catheter switches.    HYDRATION: Based on the patient's presentation of Sepsis the patient was given IV fluids. IV Hydration was used because oral hydration was not adequate alone. Upon recheck following hydration, the patient was imprved.    FINAL IMPRESSION  Visit Diagnoses     ICD-10-CM   1. Lower abdominal pain  R10.30   2. Urinary tract infection associated with indwelling urethral catheter, initial encounter ([facility])  T83.511A    N39.0        Electronically signed by: Rahul Segura M.D., 1/26/2021 12:45 AM

## 2021-01-26 NOTE — ED TRIAGE NOTES
".  Chief Complaint   Patient presents with   • Abdominal Pain     pt has suprapubic catheter that was last changed on 12/27, states since then she has notice a decrease in urine output and \"goopy\" around the catheter. pt states today her pain became worse, 9/10.       Pt BIBA for above complaint, noted pus around suprapubic site, about 200mL in jara pt states she has not emptied it for over a day. Pt in gown and on monitor. ERP to see.   "

## 2021-01-26 NOTE — ED NOTES
Discharge instructions given and discussed, signed copy in chart. Pt verbalized understanding and all questions answered. Pt discharged home in stable condition. Personal belongings with patient. IV removed and tolerated well. Pt taken home with [facility].

## 2021-01-26 NOTE — DISCHARGE PLANNING
Medical Social Work    MSW received a call from bedside RN who [facility].  RN verified home address: [address], [place], [place].  MSW contacted Yanci at [facility] for stretcher transport.  MSW faxed PCS and facesheet to [facility] and made follow up phone call to Elie to arrange transport for 0515 pending [facility] auth.  Bedside RN made aware of transport time.

## 2021-01-26 NOTE — PROGRESS NOTES
Dr. Noyola's surgery scheduler notified that patient was seen in the ER and diagnosed with a UTI today as well as a probable abscess.

## 2021-01-26 NOTE — DISCHARGE PLANNING
Medical Social Work    MSW received a call from bedside RN that pt has not been picked up by [facility] yet.  MSW contacted Chris with [facility] who states that they are still waiting on the [facility] authorization for transport.  MSW contacted Katey with [facility] who contacted [facility] and provided auth.  Pt will be picked up around 0630.  Bedside RN made aware.

## 2021-01-28 ENCOUNTER — HOSPITAL ENCOUNTER (EMERGENCY)
Facility: MEDICAL CENTER | Age: 31
End: 2021-01-29
Attending: EMERGENCY MEDICINE
Payer: MEDICARE

## 2021-01-28 DIAGNOSIS — N39.0 ACUTE UTI: ICD-10-CM

## 2021-01-28 LAB
ANION GAP SERPL CALC-SCNC: 10 MMOL/L (ref 7–16)
BACTERIA UR CULT: ABNORMAL
BACTERIA UR CULT: ABNORMAL
BASOPHILS # BLD AUTO: 0.6 % (ref 0–1.8)
BASOPHILS # BLD: 0.03 K/UL (ref 0–0.12)
BUN SERPL-MCNC: 5 MG/DL (ref 8–22)
CALCIUM SERPL-MCNC: 9.3 MG/DL (ref 8.5–10.5)
CHLORIDE SERPL-SCNC: 116 MMOL/L (ref 96–112)
CO2 SERPL-SCNC: 14 MMOL/L (ref 20–33)
CREAT SERPL-MCNC: 0.5 MG/DL (ref 0.5–1.4)
EOSINOPHIL # BLD AUTO: 0.14 K/UL (ref 0–0.51)
EOSINOPHIL NFR BLD: 3 % (ref 0–6.9)
ERYTHROCYTE [DISTWIDTH] IN BLOOD BY AUTOMATED COUNT: 45.7 FL (ref 35.9–50)
GLUCOSE SERPL-MCNC: 94 MG/DL (ref 65–99)
HCT VFR BLD AUTO: 40.6 % (ref 37–47)
HGB BLD-MCNC: 12.8 G/DL (ref 12–16)
IMM GRANULOCYTES # BLD AUTO: 0.01 K/UL (ref 0–0.11)
IMM GRANULOCYTES NFR BLD AUTO: 0.2 % (ref 0–0.9)
LYMPHOCYTES # BLD AUTO: 1.25 K/UL (ref 1–4.8)
LYMPHOCYTES NFR BLD: 26.7 % (ref 22–41)
MCH RBC QN AUTO: 29.8 PG (ref 27–33)
MCHC RBC AUTO-ENTMCNC: 31.5 G/DL (ref 33.6–35)
MCV RBC AUTO: 94.4 FL (ref 81.4–97.8)
MONOCYTES # BLD AUTO: 0.43 K/UL (ref 0–0.85)
MONOCYTES NFR BLD AUTO: 9.2 % (ref 0–13.4)
NEUTROPHILS # BLD AUTO: 2.82 K/UL (ref 2–7.15)
NEUTROPHILS NFR BLD: 60.3 % (ref 44–72)
NRBC # BLD AUTO: 0 K/UL
NRBC BLD-RTO: 0 /100 WBC
PLATELET # BLD AUTO: 185 K/UL (ref 164–446)
PMV BLD AUTO: 12.2 FL (ref 9–12.9)
POTASSIUM SERPL-SCNC: 4.4 MMOL/L (ref 3.6–5.5)
RBC # BLD AUTO: 4.3 M/UL (ref 4.2–5.4)
SIGNIFICANT IND 70042: ABNORMAL
SITE SITE: ABNORMAL
SODIUM SERPL-SCNC: 140 MMOL/L (ref 135–145)
SOURCE SOURCE: ABNORMAL
WBC # BLD AUTO: 4.7 K/UL (ref 4.8–10.8)

## 2021-01-28 PROCEDURE — 700111 HCHG RX REV CODE 636 W/ 250 OVERRIDE (IP): Performed by: EMERGENCY MEDICINE

## 2021-01-28 PROCEDURE — 700105 HCHG RX REV CODE 258: Performed by: EMERGENCY MEDICINE

## 2021-01-28 PROCEDURE — 85025 COMPLETE CBC W/AUTO DIFF WBC: CPT

## 2021-01-28 PROCEDURE — 99284 EMERGENCY DEPT VISIT MOD MDM: CPT

## 2021-01-28 PROCEDURE — 700102 HCHG RX REV CODE 250 W/ 637 OVERRIDE(OP): Performed by: EMERGENCY MEDICINE

## 2021-01-28 PROCEDURE — 80048 BASIC METABOLIC PNL TOTAL CA: CPT

## 2021-01-28 PROCEDURE — A9270 NON-COVERED ITEM OR SERVICE: HCPCS | Performed by: EMERGENCY MEDICINE

## 2021-01-28 RX ORDER — HYDROCODONE BITARTRATE AND ACETAMINOPHEN 5; 325 MG/1; MG/1
2 TABLET ORAL ONCE
Status: COMPLETED | OUTPATIENT
Start: 2021-01-28 | End: 2021-01-28

## 2021-01-28 RX ORDER — SODIUM CHLORIDE 9 MG/ML
1000 INJECTION, SOLUTION INTRAVENOUS ONCE
Status: COMPLETED | OUTPATIENT
Start: 2021-01-28 | End: 2021-01-28

## 2021-01-28 RX ORDER — NITROFURANTOIN 25; 75 MG/1; MG/1
100 CAPSULE ORAL 2 TIMES DAILY
Qty: 10 CAP | Refills: 0 | Status: SHIPPED | OUTPATIENT
Start: 2021-01-28 | End: 2021-02-02

## 2021-01-28 RX ORDER — NITROFURANTOIN 25; 75 MG/1; MG/1
100 CAPSULE ORAL ONCE
Status: COMPLETED | OUTPATIENT
Start: 2021-01-28 | End: 2021-01-28

## 2021-01-28 RX ORDER — ONDANSETRON 4 MG/1
4 TABLET, ORALLY DISINTEGRATING ORAL ONCE
Status: COMPLETED | OUTPATIENT
Start: 2021-01-28 | End: 2021-01-28

## 2021-01-28 RX ADMIN — NITROFURANTOIN MONOHYDRATE/MACROCRYSTALLINE 100 MG: 25; 75 CAPSULE ORAL at 21:43

## 2021-01-28 RX ADMIN — HYDROCODONE BITARTRATE AND ACETAMINOPHEN 2 TABLET: 5; 325 TABLET ORAL at 22:18

## 2021-01-28 RX ADMIN — ONDANSETRON 4 MG: 4 TABLET, ORALLY DISINTEGRATING ORAL at 21:43

## 2021-01-28 RX ADMIN — SODIUM CHLORIDE 1000 ML: 9 INJECTION, SOLUTION INTRAVENOUS at 21:43

## 2021-01-28 ASSESSMENT — FIBROSIS 4 INDEX: FIB4 SCORE: 0.73

## 2021-01-29 VITALS
WEIGHT: 238.1 LBS | HEIGHT: 65 IN | OXYGEN SATURATION: 93 % | RESPIRATION RATE: 16 BRPM | DIASTOLIC BLOOD PRESSURE: 59 MMHG | SYSTOLIC BLOOD PRESSURE: 102 MMHG | BODY MASS INDEX: 39.67 KG/M2 | TEMPERATURE: 96.7 F | HEART RATE: 85 BPM

## 2021-01-29 LAB — BLOOD CULTURE HOLD CXBCH: NORMAL

## 2021-01-29 NOTE — ED NOTES
Pt aox4, skin pale, cool and dry, airway patent, rr even and unlabored, nad noted. No new complaints. No new incident or distress noted. Pt vss. Pt transports by EMS due to transverse myelitis. PMS intact to all extremities. Suprapubic cath in place. Pt transported to EMS gurney to home by [facility].

## 2021-01-29 NOTE — ED NOTES
ED Positive Culture Follow-up/Notification Note:   Date: 1/28/21    Patient seen in the ED on 1/26/2021 for abdominal pain and drainage around her suprapubic catheter. Suprapubic cath was last changed on 12/27. Patient's cath was changed in the ED and was given Rocephin 1g IV x 1. Patient has multiple antibiotics allergies.     1. Lower abdominal pain    2. Urinary tract infection associated with indwelling urethral catheter, initial encounter ([facility])      Discharge Medication List as of 1/26/2021  3:54 AM      START taking these medications    Details   cefdinir (OMNICEF) 300 MG Cap Take 1 Cap by mouth 2 times a day for 5 days., Disp-10 Cap, R-0, Normal           Allergies: Depakote [divalproex sodium], Amitriptyline, Aripiprazole [abilify], Clindamycin, Flagyl [metronidazole hcl], Flomax [tamsulosin hydrochloride], Levaquin, Metformin, Tape, Vancomycin, Wound dressing adhesive, Ampicillin, Ciprofloxacin, Keflex, Levofloxacin, Metronidazole, Penicillins, Sulfa drugs, Tamsulosin, and Valproic acid    Vitals:    01/26/21 0200 01/26/21 0400 01/26/21 0500 01/26/21 0600   BP: 128/70 117/66 117/70 117/68   Pulse: 77 84 84 84   Resp:  14 15 16   Temp:    37 °C (98.6 °F)   TempSrc:    Temporal   SpO2: 96% 96% 95% 93%   Weight:       Height:           Final cultures:   Results     Procedure Component Value Units Date/Time    URINE CULTURE(NEW) [[id number]]  (Abnormal)  (Susceptibility) Collected: 01/26/21 0219    Order Status: Completed Specimen: Urine Updated: 01/28/21 0926     Significant Indicator POS     Source UR     Site -     Culture Result -      Enterococcus faecalis  >100,000 cfu/mL      Narrative:      Indication for culture:->Patient WITHOUT an indwelling Andrade  catheter in place with new onset of Dysuria, Frequency,  Urgency, and/or Suprapubic pain    Susceptibility     Enterococcus faecalis (1)     Antibiotic Interpretation Microscan Method Status    Ciprofloxacin [*]  Sensitive <=1 mcg/mL TYLER Final    Daptomycin  "Sensitive 1 mcg/mL TYLER Final    Nitrofurantoin Sensitive <=32 mcg/mL TYLER Final    Gent Synergy [*]  Resistant >500 mcg/mL TYLER Final    Levofloxacin [*]  Sensitive <=1 mcg/mL TYLER Final    Ampicillin Sensitive <=2 mcg/mL TYLER Final    Linezolid [*]  Sensitive 2 mcg/mL TYLER Final    Vancomycin Sensitive 1 mcg/mL TYLER Final    Penicillin Sensitive 1 mcg/mL TYLER Final    Rifampin [*]  Intermediate 2 mcg/mL TYLER Final    Strep Synergy [*]  Sensitive <=1000 mcg/mL TYLER Final    Tetracycline Resistant >8 mcg/mL TYLER Final    Tigecycline [*]  Sensitive <=0.25 mcg/mL TYLER Final           [*]   Suppressed Antibiotic                 BLOOD CULTURE [[id number]] Collected: 01/26/21 0145    Order Status: Completed Specimen: Blood from Peripheral Updated: 01/27/21 0900     Significant Indicator NEG     Source BLD     Site PERIPHERAL     Culture Result No Growth  Note: Blood cultures are incubated for 5 days and  are monitored continuously.Positive blood cultures  are called to the RN and reported as soon as  they are identified.      Narrative:      Per Hospital Policy: Only change Specimen Src: to \"Line\" if  specified by physician order.  Left Forearm/Arm    URINALYSIS CULTURE, IF INDICATED [[id number]]  (Abnormal) Collected: 01/26/21 0219    Order Status: Completed Specimen: Urine, Suprapubic Updated: 01/26/21 0254     Color Yellow     Character Turbid     Specific Gravity 1.023     Ph 7.5     Glucose Negative mg/dL      Ketones Trace mg/dL      Protein 100 mg/dL      Bilirubin Negative     Urobilinogen, Urine 1.0     Nitrite Negative     Leukocyte Esterase Large     Occult Blood Large     Micro Urine Req Microscopic    Narrative:      Indication for culture:->Patient WITHOUT an indwelling Andrade  catheter in place with new onset of Dysuria, Frequency,  Urgency, and/or Suprapubic pain    BLOOD CULTURE [[id number]]     Order Status: Canceled Specimen: Blood from Peripheral           Plan:   Urine culture grew Enterococcus faecalis which may be " colonization of the patient's cath. Patient did not have leukocytosis and was afebrile. Cath was exchanged in the ED. Called patient to assess sign and symptoms. Reports that she is not doing well and does not think the antibiotics are helping compared to her prior UTIs. Reported that her temperature was 99F. Asked patient if she has seen urology yet and report that she has not since they haven't called her back. Reported to patient if she is still having problems with her cath and abdominal pain to come back in to get re-evaluated. Patient voiced understanding and had no further questions.     Told patient that the isolated organism is resistant to prescribed therapy.  Discussed culture result and change in antibiotics with patient, who will  new prescription at [facility] Rx:  Macrobid 100mg BID x 5 days #10, no refills - MD: Stanley per protocol    Comfort Charles, PharmD  T: [phone]

## 2021-01-29 NOTE — ED PROVIDER NOTES
ED Provider Note    Scribed for Emery Piña M.D. by Sinai Meza. 1/28/2021,  7:59 PM.    CHIEF COMPLAINT  Chief Complaint   Patient presents with   • Sent by MD   • UTI       HPI  Kristin Balderrama is a 30 y.o. female who presents to the Emergency Department via EMS for a worsening UTI. Patient was seen here two days ago for the same complaint and states her symptoms have been getting progressively worse. She has associated pain and swelling around the sight of the indwelling suprapubic catheter, fevers, chills, nausea, vomiting, diarrhea, malodorous urine, and decreased appetite. Patient has been attempting to take prescribed antibiotics but likely has been unsuccessful secondary to vomiting. She also reports some shortness of breath and palpitations. She called [facility] today who recommended she come to the ED to receive IV antibiotics. Patient additionally reports that she was recently hospitalized for transverse myelitis.    REVIEW OF SYSTEMS  Pertinent positives:  pain and swelling around the sight of the indwelling suprapubic catheter, fevers, chills, nausea, vomiting, diarrhea, malodorous urine, and decreased appetite. See HPI for further details. All other systems are negative.     PAST MEDICAL HISTORY   has a past medical history of Abdominal pain, Anginal syndrome, Apnea, sleep, Arrhythmia, Arthritis, ASTHMA, Atrial fibrillation (HCC), Back pain, Borderline personality disorder (HCC), Breath shortness, Bronchitis, Cardiac arrhythmia, Chickenpox, Chronic obstructive pulmonary disease (HCC), Chronic UTI (9/18/2010), Cough, Daytime sleepiness, Depression, Diabetes (HCC), Diarrhea, Disorder of thyroid, Fall, Fatigue, Frequent headaches, Gasping for breath, Gynecological disorder, Headache(784.0), Heart burn, History of falling, Indigestion, Migraine, 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HCC), 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HCC), Nausea, Obesity, Other fatigue (6/29/2020), Pain (08-15-12), Painful joint,  PCOS (polycystic ovarian syndrome), Pneumonia (2012), Psychosis (HCC), Ringing in ears, Scoliosis, Shortness of breath, Sinus tachycardia (10/31/2013), Sleep apnea, Snoring, Tonsillitis, 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HCC), Tuberculosis, Urinary bladder disorder, Urinary incontinence, Weakness, and Wears glasses.    SOCIAL HISTORY  Social History     Tobacco Use   • Smoking status: Never Smoker   • Smokeless tobacco: Never Used   Substance and Sexual Activity   • Alcohol use: No     Alcohol/week: 0.0 oz     Frequency: Never     Binge frequency: Never   • Drug use: Not Currently     Frequency: 7.0 times per week     Types: Marijuana   • Sexual activity: Not Currently     Birth control/protection: Pill     Social History     Substance and Sexual Activity   Drug Use Not Currently   • Frequency: 7.0 times per week   • Types: Marijuana       SURGICAL HISTORY   has a past surgical history that includes neuro dest facet l/s w/ig sngl (4/21/2015); recovery (1/27/2016); delmar by laparoscopy (8/29/2010); lumbar fusion anterior (8/21/2012); other cardiac surgery (1/2016); tonsillectomy; bowel stimulator insertion (7/13/2016); gastroscopy with balloon dilatation (N/A, 1/4/2017); muscle biopsy (Right, 1/26/2017); other abdominal surgery; and laminotomy.    CURRENT MEDICATIONS  Current Outpatient Medications:   •  nitrofurantoin (MACROBID) 100 MG Cap, Take 1 Cap by mouth 2 times a day for 5 days., Disp: 10 Cap, Rfl: 0  •  cefdinir (OMNICEF) 300 MG Cap, Take 1 Cap by mouth 2 times a day for 5 days., Disp: 10 Cap, Rfl: 0  •  esomeprazole magnesium (NEXIUM) 40 MG Pack, Take 40 mg by mouth every morning before breakfast., Disp: , Rfl:   •  busPIRone (BUSPAR) 10 MG Tab tablet, Take 10 mg by mouth 3 times a day., Disp: , Rfl:   •  levothyroxine (SYNTHROID) 100 MCG Tab, Take 100 mcg by mouth Every morning on an empty stomach., Disp: , Rfl:   •  Melatonin 10 MG Tab, Take 10 mg by mouth every bedtime., Disp: , Rfl:   •  potassium chloride  (KLOR-CON) 20 MEQ Pack, Take 2 Packets by mouth 2 times a day., Disp: 120 Packet, Rfl: 3  •  Dulaglutide (TRULICITY) 3 MG/0.5ML Solution Pen-injector, Inject 3 mg under the skin every 7 days. (Patient taking differently: Inject 3 mg under the skin every Friday.), Disp: 2 mL, Rfl: 6  •  fluoxetine (PROZAC) 40 MG capsule, Take 1 Cap by mouth every day., Disp: 90 Cap, Rfl: 0  •  OXcarbazepine (TRILEPTAL) 300 MG Tab, Take 1 Tab by mouth 2 times a day., Disp: 180 Tab, Rfl: 0  •  ziprasidone (GEODON) 40 MG Cap, Take 1 tablet by mouth twice a day (Patient taking differently: Take 40 mg by mouth 2 Times a Day. Take 1 tablet by mouth twice a day), Disp: 180 Cap, Rfl: 0  •  albuterol 108 (90 Base) MCG/ACT Aero Soln inhalation aerosol, Inhale 2 Puffs every 6 hours as needed for Shortness of Breath., Disp: 8.5 g, Rfl: 6  •  Mirabegron ER (MYRBETRIQ) 50 MG TABLET SR 24 HR, Take 50 mg by mouth every day., Disp: 90 Tab, Rfl: 3  •  acetaminophen (TYLENOL) 500 MG Tab, Take 1,000 mg by mouth every 6 hours as needed for Moderate Pain., Disp: , Rfl:   •  sodium bicarbonate 325 MG Tab, Take 650 mg by mouth 2 times a day., Disp: , Rfl:   •  topiramate (TOPAMAX) 50 MG tablet, Take 50 mg by mouth 2 times a day., Disp: , Rfl:   •  ondansetron (ZOFRAN ODT) 4 MG TABLET DISPERSIBLE, Take 1 Tab by mouth every 6 hours as needed for Nausea., Disp: 10 Tab, Rfl: 0  •  ipratropium-albuterol (DUONEB) 0.5-2.5 (3) MG/3ML nebulizer solution, Take 3 mL by nebulization every four hours as needed for Shortness of Breath. Nebulizer , Disp: , Rfl:   •  mycophenolate (CELLCEPT) 500 MG tablet, Take 2 Tabs by mouth 2 times a day., Disp: 60 Tab, Rfl: 2  •  methocarbamol (ROBAXIN) 750 MG Tab, Take 750 mg by mouth every bedtime. Indications: Musculoskeletal Pain, Disp: , Rfl:   •  calcium carbonate (TUMS EX) 750 MG chewable tablet, Take 2 Tabs by mouth every day. (Patient taking differently: Chew 1,500 mg 1 time a day as needed.), Disp: 60 Tab, Rfl: 0  •  vitamin  "D, Ergocalciferol, (DRISDOL) 1.25 MG (26842 UT) Cap capsule, Take 50,000 Units by mouth every 7 days., Disp: , Rfl:   •  traZODone (DESYREL) 100 MG Tab, Take 1 Tab by mouth every bedtime. (Patient taking differently: Take 100 mg by mouth every evening.), Disp: 30 Tab, Rfl: 3  •  oxybutynin (DITROPAN) 5 MG Tab, Take 1 Tab by mouth 3 times a day., Disp: 90 Tab, Rfl: 0  •  acetaZOLAMIDE SR (DIAMOX) 500 MG CAPSULE SR 12 HR, Take 500 mg by mouth 2 times a day., Disp: , Rfl:   •  ivabradine (CORLANOR) 7.5 MG Tab tablet, Take 7.5 mg by mouth 2 times a day, with meals., Disp: , Rfl:   •  aspirin EC (ECOTRIN) 81 MG Tablet Delayed Response, Take 81 mg by mouth every day., Disp: , Rfl:      ALLERGIES  Allergies   Allergen Reactions   • Depakote [Divalproex Sodium] Unspecified     Muscle spasms/muscle pain and weakness     • Amitriptyline Unspecified     Headaches     • Aripiprazole [Abilify] Unspecified     Headaches/muscle twitching     • Clindamycin Nausea     Even with food     • Flagyl [Metronidazole Hcl] Unspecified     \"eye problems\"     • Flomax [Tamsulosin Hydrochloride] Swelling   • Levaquin Unspecified     Severe muscle cramps in legs  RXN=unknown   • Metformin Unspecified     Increased lactic acid      • Tape Rash     Tears skin off  coban with Tegaderm tape ok intermittently  RXN=ongoing   • Vancomycin Itching     Pt becomes flushed in face and chest.   RXN=7/10/16   • Wound Dressing Adhesive Hives     By pt report   • Ampicillin Rash     Pt reports that she received a rash    • Ciprofloxacin    • Keflex Rash     Pt states she gets a rash with this medication  Tolerates ceftriaxone   • Levofloxacin Unspecified     Leg muscle cramps   • Metronidazole Rash     .   • Penicillins Hives   • Sulfa Drugs Myalgia     Muscle pain and weakness   • Tamsulosin Swelling   • Valproic Acid Rash     .       PHYSICAL EXAM  VITAL SIGNS: /80   Pulse (!) 106   Temp 37.1 °C (98.7 °F) (Temporal)   Resp 14   Ht 1.651 m (5' 5\")  "  Wt 108 kg (238 lb 1.6 oz)   SpO2 99%   BMI 39.62 kg/m²   Pulse ox interpretation: I interpret this pulse ox as normal.  Constitutional: Alert in no apparent distress.  HENT: No signs of trauma, Bilateral external ears normal, Nose normal.   Eyes: Conjunctiva normal, Non-icteric.   Neck: Normal range of motion, Supple, No stridor.   Lymphatic: No lymphadenopathy noted.   Cardiovascular: Regular rate and rhythm, no murmurs.   Thorax & Lungs: Normal breath sounds, No respiratory distress, No wheezing, No chest tenderness.   Abdomen: Indwelling suprapubic catheter with surrounding mild skin irritation.  There is no bandage in place.  Skin: Warm, Dry, No erythema, No rash.   Back: No midline bony tenderness.  Extremities: Intact distal pulses, No edema, No cyanosis.  Musculoskeletal: Good range of motion in all major joints. No or major deformities noted.   Neurologic: Alert , Normal motor function, Normal sensory function, No focal deficits noted.   Psychiatric: Affect normal, Judgment normal, Mood normal.     DIAGNOSTIC STUDIES / PROCEDURES    LABS  Labs Reviewed   CBC WITH DIFFERENTIAL - Abnormal; Notable for the following components:       Result Value    WBC 4.7 (*)     MCHC 31.5 (*)     All other components within normal limits   BASIC METABOLIC PANEL - Abnormal; Notable for the following components:    Chloride 116 (*)     Co2 14 (*)     Bun 5 (*)     All other components within normal limits   ESTIMATED GFR     All labs reviewed by me.      COURSE & MEDICAL DECISION MAKING  Nursing notes, VS, PMSFHx reviewed in chart.     7:59 PM Patient seen and examined at bedside. Differential diagnosis includes but is not limited to UTI vs pylonephritis. Ordered for CBC with diff and BMP to evaluate. Patient will be treated with Zofran 4 mg and nitrofurantoin 100 mg for her symptoms.     9:51 PM - Patient will be treated with Norco 5-325 mg 2 tablets.     HYDRATION: Based on the patient's presentation of Reports of  vomiting and poor p.o. intake, with slight tachycardia on presentation the patient was given IV fluids. IV Hydration was used because oral hydration was not adequate alone. Upon recheck following hydration, the patient was improved.    10:51 PM this patient's blood work was reassuring.  There was mild metabolic acidosis, addressed with a saline bolus as above.  She has tolerated oral Macrobid without difficulty.  We have discussed that her urinary tract infection is sensitive to the Macrobid that was recently prescribed, after the change from ceftriaxone.  She knows that she should follow-up with her urologist and primary care doctor, and complete her Macrobid prescription as prescribed.     The patient will return for new or worsening symptoms and is stable at the time of discharge.    The patient is referred to a primary physician for blood pressure management, diabetic screening, and for all other preventative health concerns.      DISPOSITION:  Patient will be discharged home in stable condition.    FOLLOW UP:  Sue Preston M.D.  [address]  [address]  [facility] [address]  [phone]    Schedule an appointment as soon as possible for a visit         OUTPATIENT MEDICATIONS:  New Prescriptions    No medications on file         FINAL IMPRESSION  1. Acute UTI         Sinai RODRIGUEZ (Eva), am scribing for, and in the presence of, Emery Piña M.D..    Electronically signed by: Sinai Meza (Eva), 1/28/2021    Emery RODRIGUEZ M.D. personally performed the services described in this documentation, as scribed by Sinai Meza in my presence, and it is both accurate and complete. C    The note accurately reflects work and decisions made by me.  Emery Piña M.D.  1/28/2021  10:52 PM

## 2021-01-29 NOTE — DISCHARGE PLANNING
Medical Social Work     KRYSTIAN received a call from the RN requesting KRYSTIAN assistance with [facility] transport home. The pt is bed bound. KRYSTIAN called [facility] and obtained authorization from Sinai. KRYSTIAN faxed a PCS form to [facility] and set up [facility] with [facility] for 0115. RN aware of transport time.

## 2021-01-29 NOTE — ED NOTES
Social work called to arrange transport home due to patient inability to ambulate, wheelchair bound at home.

## 2021-01-29 NOTE — ED NOTES
Pt sleeping in bed, equal chest rise and fall, airway patent, rr even and unlabored, nad noted.

## 2021-01-29 NOTE — ED TRIAGE NOTES
"Chief Complaint   Patient presents with   • Sent by MD   • UTI     31 yo female in wheelchair to triage for above complaint. Pt was sent by MD due to + E. Coli urine culture, has indwelling suprapubic catheter changed on 1/26. Reports increasing fatigue and not feeling well. AOx4, GCS 15.    Educated on triage process, encourage to inform staff of any changes.     /80   Pulse (!) 106   Temp 37.1 °C (98.7 °F) (Temporal)   Resp 14   Ht 1.651 m (5' 5\")   Wt 108 kg (238 lb 1.6 oz)   SpO2 99%   BMI 39.62 kg/m²   "

## 2021-01-30 LAB — TEST NAME 95000: NORMAL

## 2021-01-31 LAB
BACTERIA BLD CULT: NORMAL
SIGNIFICANT IND 70042: NORMAL
SITE SITE: NORMAL
SOURCE SOURCE: NORMAL

## 2021-02-02 ENCOUNTER — HOSPITAL ENCOUNTER (EMERGENCY)
Facility: MEDICAL CENTER | Age: 31
End: 2021-02-02
Attending: EMERGENCY MEDICINE
Payer: MEDICARE

## 2021-02-02 VITALS
HEART RATE: 89 BPM | BODY MASS INDEX: 39.94 KG/M2 | OXYGEN SATURATION: 97 % | TEMPERATURE: 98.6 F | DIASTOLIC BLOOD PRESSURE: 66 MMHG | WEIGHT: 240 LBS | SYSTOLIC BLOOD PRESSURE: 113 MMHG | RESPIRATION RATE: 14 BRPM

## 2021-02-02 DIAGNOSIS — R25.2 MUSCLE CRAMPS: ICD-10-CM

## 2021-02-02 DIAGNOSIS — R25.1 SHAKING: ICD-10-CM

## 2021-02-02 LAB
ALBUMIN SERPL BCP-MCNC: 3.8 G/DL (ref 3.2–4.9)
ALBUMIN/GLOB SERPL: 2.2 G/DL
ALP SERPL-CCNC: 62 U/L (ref 30–99)
ALT SERPL-CCNC: 19 U/L (ref 2–50)
ANION GAP SERPL CALC-SCNC: 11 MMOL/L (ref 7–16)
AST SERPL-CCNC: 18 U/L (ref 12–45)
BASOPHILS # BLD AUTO: 1.2 % (ref 0–1.8)
BASOPHILS # BLD: 0.05 K/UL (ref 0–0.12)
BILIRUB SERPL-MCNC: 0.3 MG/DL (ref 0.1–1.5)
BUN SERPL-MCNC: 6 MG/DL (ref 8–22)
CALCIUM SERPL-MCNC: 8.8 MG/DL (ref 8.5–10.5)
CHLORIDE SERPL-SCNC: 113 MMOL/L (ref 96–112)
CO2 SERPL-SCNC: 13 MMOL/L (ref 20–33)
CREAT SERPL-MCNC: 0.56 MG/DL (ref 0.5–1.4)
EOSINOPHIL # BLD AUTO: 0.15 K/UL (ref 0–0.51)
EOSINOPHIL NFR BLD: 3.7 % (ref 0–6.9)
ERYTHROCYTE [DISTWIDTH] IN BLOOD BY AUTOMATED COUNT: 43.1 FL (ref 35.9–50)
GLOBULIN SER CALC-MCNC: 1.7 G/DL (ref 1.9–3.5)
GLUCOSE SERPL-MCNC: 99 MG/DL (ref 65–99)
HCT VFR BLD AUTO: 37.6 % (ref 37–47)
HGB BLD-MCNC: 12.6 G/DL (ref 12–16)
IMM GRANULOCYTES # BLD AUTO: 0.01 K/UL (ref 0–0.11)
IMM GRANULOCYTES NFR BLD AUTO: 0.2 % (ref 0–0.9)
LYMPHOCYTES # BLD AUTO: 1.6 K/UL (ref 1–4.8)
LYMPHOCYTES NFR BLD: 39.5 % (ref 22–41)
MCH RBC QN AUTO: 30.1 PG (ref 27–33)
MCHC RBC AUTO-ENTMCNC: 33.5 G/DL (ref 33.6–35)
MCV RBC AUTO: 89.7 FL (ref 81.4–97.8)
MONOCYTES # BLD AUTO: 0.38 K/UL (ref 0–0.85)
MONOCYTES NFR BLD AUTO: 9.4 % (ref 0–13.4)
NEUTROPHILS # BLD AUTO: 1.86 K/UL (ref 2–7.15)
NEUTROPHILS NFR BLD: 46 % (ref 44–72)
NRBC # BLD AUTO: 0 K/UL
NRBC BLD-RTO: 0 /100 WBC
PLATELET # BLD AUTO: 176 K/UL (ref 164–446)
PMV BLD AUTO: 12.2 FL (ref 9–12.9)
POTASSIUM SERPL-SCNC: 3.4 MMOL/L (ref 3.6–5.5)
PROT SERPL-MCNC: 5.5 G/DL (ref 6–8.2)
RBC # BLD AUTO: 4.19 M/UL (ref 4.2–5.4)
SODIUM SERPL-SCNC: 137 MMOL/L (ref 135–145)
WBC # BLD AUTO: 4.1 K/UL (ref 4.8–10.8)

## 2021-02-02 PROCEDURE — 85025 COMPLETE CBC W/AUTO DIFF WBC: CPT

## 2021-02-02 PROCEDURE — 99284 EMERGENCY DEPT VISIT MOD MDM: CPT

## 2021-02-02 PROCEDURE — 80053 COMPREHEN METABOLIC PANEL: CPT

## 2021-02-02 PROCEDURE — 700111 HCHG RX REV CODE 636 W/ 250 OVERRIDE (IP): Performed by: EMERGENCY MEDICINE

## 2021-02-02 RX ORDER — ONDANSETRON 4 MG/1
4 TABLET, ORALLY DISINTEGRATING ORAL ONCE
Status: COMPLETED | OUTPATIENT
Start: 2021-02-02 | End: 2021-02-02

## 2021-02-02 RX ADMIN — ONDANSETRON 4 MG: 4 TABLET, ORALLY DISINTEGRATING ORAL at 05:15

## 2021-02-02 ASSESSMENT — FIBROSIS 4 INDEX: FIB4 SCORE: 0.63

## 2021-02-02 NOTE — ED PROVIDER NOTES
ED Provider Note    CHIEF COMPLAINT  Chief Complaint   Patient presents with   • Muscle Spasms       HPI  Kristin Balderrama is a 30 y.o. female who presents for evaluation of an episode of shaking.  Patient notes she woke up from sleep shaking vigorously but does not recall how long it took to resolve.  She notes that she did not lose consciousness and remembers the episode however.  She has not had any recent fevers or chills but notes she is currently being treated for urinary tract infection with Macrobid.  She notes she has been having muscle spasms in her calf region which have caused pain and difficulty moving her ankles.    REVIEW OF SYSTEMS  Constitutional: No fevers or chills  Skin: No rashes  HEENT: No ear pain, ringing in ears, or decreased hearing. No sore throat, runny nose, sores, trouble swallowing, trouble speaking.  Neck: No neck pain  Chest: No pain   Pulm: No shortness of breath, cough, wheezing, stridor, or pain with inspiration/expiration  Gastrointestinal: No nausea, vomiting, diarrhea, constipation, bloating, melena, hematochezia or abdominal pain.  Genitourinary: Suprapubic catheter in place.  No hematuria or cloudy urine.  Musculoskeletal: Bilateral calf cramping.  No recent trauma.  No swelling.  Neurologic: No sensory or motor changes. No confusion or disorientation.  Heme: No bleeding or bruising problems.   Immuno: Recurrent urinary tract infections due to indwelling catheter.      PAST MEDICAL HISTORY   has a past medical history of Abdominal pain, Anginal syndrome, Apnea, sleep, Arrhythmia, Arthritis, ASTHMA, Atrial fibrillation ([facility]), Back pain, Borderline personality disorder ([facility]), Breath shortness, Bronchitis, Cardiac arrhythmia, Chickenpox, Chronic obstructive pulmonary disease ([facility]), Chronic UTI (9/18/2010), Cough, Daytime sleepiness, Depression, Diabetes ([facility]), Diarrhea, Disorder of thyroid, Fall, Fatigue, Frequent headaches, Gasping for breath, Gynecological disorder,  "Headache(784.0), Heart burn, History of falling, Indigestion, Migraine, 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HCC), 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HCC), Nausea, Obesity, Other fatigue (6/29/2020), Pain (08-15-12), Painful joint, PCOS (polycystic ovarian syndrome), Pneumonia (2012), Psychosis ([facility]), Ringing in ears, Scoliosis, Shortness of breath, Sinus tachycardia (10/31/2013), Sleep apnea, Snoring, Tonsillitis, 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[facility]), Tuberculosis, Urinary bladder disorder, Urinary incontinence, Weakness, and Wears glasses.    SOCIAL HISTORY  Social History     Tobacco Use   • Smoking status: Never Smoker   • Smokeless tobacco: Never Used   Substance and Sexual Activity   • Alcohol use: No     Alcohol/week: 0.0 oz     Frequency: Never     Binge frequency: Never   • Drug use: Not Currently     Frequency: 7.0 times per week     Types: Marijuana   • Sexual activity: Not Currently     Birth control/protection: Pill       SURGICAL HISTORY   has a past surgical history that includes neuro dest facet l/s w/ig sngl (4/21/2015); recovery (1/27/2016); delmar by laparoscopy (8/29/2010); lumbar fusion anterior (8/21/2012); other cardiac surgery (1/2016); tonsillectomy; bowel stimulator insertion (7/13/2016); gastroscopy with balloon dilatation (N/A, 1/4/2017); muscle biopsy (Right, 1/26/2017); other abdominal surgery; and laminotomy.    CURRENT MEDICATIONS  Home Medications    **Home medications have not yet been reviewed for this encounter**         ALLERGIES  Allergies   Allergen Reactions   • Depakote [Divalproex Sodium] Unspecified     Muscle spasms/muscle pain and weakness     • Amitriptyline Unspecified     Headaches     • Aripiprazole [Abilify] Unspecified     Headaches/muscle twitching     • Clindamycin Nausea     Even with food     • Flagyl [Metronidazole Hcl] Unspecified     \"eye problems\"     • Flomax [Tamsulosin Hydrochloride] Swelling   • Levaquin Unspecified     Severe muscle cramps in legs  RXN=unknown   • Metformin " Unspecified     Increased lactic acid      • Tape Rash     Tears skin off  coban with Tegaderm tape ok intermittently  RXN=ongoing   • Vancomycin Itching     Pt becomes flushed in face and chest.   RXN=7/10/16   • Wound Dressing Adhesive Hives     By pt report   • Ampicillin Rash     Pt reports that she received a rash    • Ciprofloxacin    • Keflex Rash     Pt states she gets a rash with this medication  Tolerates ceftriaxone   • Levofloxacin Unspecified     Leg muscle cramps   • Metronidazole Rash     .   • Penicillins Hives   • Sulfa Drugs Myalgia     Muscle pain and weakness   • Tamsulosin Swelling   • Valproic Acid Rash     .       PHYSICAL EXAM  VITAL SIGNS: /80   Pulse 85   Temp 37 °C (98.6 °F)   Resp (!) 25   Wt 109 kg (240 lb)   SpO2 97%   BMI 39.94 kg/m²    Gen: Alert in no apparent distress.  Calm, conversant  HEENT: No signs of trauma, Bilateral external ears normal, Nose normal. Conjunctiva normal, Non-icteric.    Cardiovascular: Regular rate and rhythm. Capillary refill less than 3 seconds to all extremities, 2+ distal pulses.  Thorax & Lungs: Normal breath sounds, No respiratory distress, No wheezing bilateral chest rise  Abdomen: Bowel sounds normal, Soft, No tenderness, No masses, No pulsatile masses. No Guarding or rebound  Skin: Warm, Dry, No erythema, No rash noted to exposed areas.   Extremities: Intact distal pulses, No edema.  No apparent distress with palpation of the calf, negative Homans' sign.  Able to passively range ankles with patient distracted.  Neurologic: Alert , no facial droop, grossly normal coordination and strength to upper extremities.  Patient is able to wiggle toes and partially range ankles with encouragement  Psychiatric: Affect appropriate        LABS  Results for orders placed or performed during the hospital encounter of 02/02/21   CBC WITH DIFFERENTIAL   Result Value Ref Range    WBC 4.1 (L) 4.8 - 10.8 K/uL    RBC 4.19 (L) 4.20 - 5.40 M/uL    Hemoglobin  12.6 12.0 - 16.0 g/dL    Hematocrit 37.6 37.0 - 47.0 %    MCV 89.7 81.4 - 97.8 fL    MCH 30.1 27.0 - 33.0 pg    MCHC 33.5 (L) 33.6 - 35.0 g/dL    RDW 43.1 35.9 - 50.0 fL    Platelet Count 176 164 - 446 K/uL    MPV 12.2 9.0 - 12.9 fL    Neutrophils-Polys 46.00 44.00 - 72.00 %    Lymphocytes 39.50 22.00 - 41.00 %    Monocytes 9.40 0.00 - 13.40 %    Eosinophils 3.70 0.00 - 6.90 %    Basophils 1.20 0.00 - 1.80 %    Immature Granulocytes 0.20 0.00 - 0.90 %    Nucleated RBC 0.00 /100 WBC    Neutrophils (Absolute) 1.86 (L) 2.00 - 7.15 K/uL    Lymphs (Absolute) 1.60 1.00 - 4.80 K/uL    Monos (Absolute) 0.38 0.00 - 0.85 K/uL    Eos (Absolute) 0.15 0.00 - 0.51 K/uL    Baso (Absolute) 0.05 0.00 - 0.12 K/uL    Immature Granulocytes (abs) 0.01 0.00 - 0.11 K/uL    NRBC (Absolute) 0.00 K/uL   COMP METABOLIC PANEL   Result Value Ref Range    Sodium 137 135 - 145 mmol/L    Potassium 3.4 (L) 3.6 - 5.5 mmol/L    Chloride 113 (H) 96 - 112 mmol/L    Co2 13 (L) 20 - 33 mmol/L    Anion Gap 11.0 7.0 - 16.0    Glucose 99 65 - 99 mg/dL    Bun 6 (L) 8 - 22 mg/dL    Creatinine 0.56 0.50 - 1.40 mg/dL    Calcium 8.8 8.5 - 10.5 mg/dL    AST(SGOT) 18 12 - 45 U/L    ALT(SGPT) 19 2 - 50 U/L    Alkaline Phosphatase 62 30 - 99 U/L    Total Bilirubin 0.3 0.1 - 1.5 mg/dL    Albumin 3.8 3.2 - 4.9 g/dL    Total Protein 5.5 (L) 6.0 - 8.2 g/dL    Globulin 1.7 (L) 1.9 - 3.5 g/dL    A-G Ratio 2.2 g/dL   ESTIMATED GFR   Result Value Ref Range    GFR If African American >60 >60 mL/min/1.73 m 2    GFR If Non African American >60 >60 mL/min/1.73 m 2     *Note: Due to a large number of results and/or encounters for the requested time period, some results have not been displayed. A complete set of results can be found in Results Review.           COURSE & MEDICAL DECISION MAKING  Patient had symptoms prior to arrival suggestive of chills or rigors but has not had any measured fevers.  She certainly was not unconscious and I very much doubt this is related to a  seizure event.  The patient does not appear septic or toxic but claims that she has difficulty moving her lower extremities.  She does move them, however, with encouragement.  She has excellent perfusion to all extremities and her extremities are warm and dry.  I suspect the patient will be dischargeable provided she does not have any evidence for organ dysfunction by labs.  I do not feel imaging of her head or spinal cord is necessary at this point as she has no significant new symptoms to suggest neurologic issues.  It is notable that she has chronic lower extremity cramps and is somewhat chronically debilitated from her transverse myelitis.    5:38 AM   Evaluated patient at bedside, she is calm, conversant, and has stable vital signs.  She states understanding the findings and plan for discharge.  There does not appear to be any emergent issues as the cause or the as the result of the patient's episode tonight.  She has noted to have a low bicarb but no anion gap and she does not appear dehydrated by labs.  I feel she is safe for discharge on her current treatment provided her symptoms do not worsen or change, in which case she should return for immediate reevaluation.    FINAL IMPRESSION  1. Shaking    2. Muscle cramps        Electronically signed by: Jem Saha M.D., 2/2/2021 3:28 AM

## 2021-02-02 NOTE — DISCHARGE PLANNING
Medical Social Work     MSW received a call from bedside RN who states that pt needs a [facility] ride home.  MSW contacted Parrish at [facility] for stretcher transport.  MSW faxed PCS and facesheet to [facility] and made follow up phone call to Tatyana to arrange transport for 0645 pending [facility] auth.  Bedside RN made aware of transport time.

## 2021-02-02 NOTE — DOCUMENTATION QUERY
"                                                                         [facility]                                                                       Query Response Note      PATIENT:               HARLEY DE LA CRUZ  ACCT #:                  [id number]  MRN:                     [id number]  :                      1990  ADMIT DATE:       2021 11:50 AM  DISCH DATE:        2021 5:16 PM  RESPONDING  PROVIDER #:        [id number]           QUERY TEXT:    Pt has documented Conversion Disorder noted as ?rule out? or similar terminology such as suspected, probable, etc.  Please clarify status of this condition:    NOTE:  If an appropriate response is not listed below, please respond with a new note.       The patient's Clinical Indicators include:  *  Consult dated  indicates Reviewed CT myelogram, Cervical, Thoracic and Lumbar spine. Examination equivocal for structural pathology. At this time it appears her weakness is mostly psychogenic in nature (? conversion disorder)  although return of BLE clonis reflexes on  compared to absent reflexes on  is interesting \"    * Patient has known Transverse myelitis    * UDS negative    *  \" CT myelogram and LP grossly unremarkable \" is noted in the D/S    * D/S indicates \" extremity weakness likely secondary to fall with baseline disability with transverse myelitis\"      [email or web address]  Options provided:   -- Conversion disorder is ruled in   -- Conversion disorder is  ruled out   -- Unable to determine      Query created by: Dulce Manning on 2021 12:29 PM    RESPONSE TEXT:    Conversion disorder is ruled in          Electronically signed by:  SMITHA LANGFORD MD 2021 9:12 PM              "

## 2021-02-03 ENCOUNTER — NURSE TRIAGE (OUTPATIENT)
Dept: HEALTH INFORMATION MANAGEMENT | Facility: OTHER | Age: 31
End: 2021-02-03

## 2021-02-03 ENCOUNTER — APPOINTMENT (OUTPATIENT)
Dept: PHYSICAL MEDICINE AND REHAB | Facility: REHABILITATION | Age: 31
End: 2021-02-03
Payer: MEDICARE

## 2021-02-03 ENCOUNTER — APPOINTMENT (OUTPATIENT)
Dept: RADIOLOGY | Facility: MEDICAL CENTER | Age: 31
End: 2021-02-03
Attending: EMERGENCY MEDICINE
Payer: MEDICARE

## 2021-02-03 ENCOUNTER — HOSPITAL ENCOUNTER (EMERGENCY)
Facility: MEDICAL CENTER | Age: 31
End: 2021-02-03
Attending: EMERGENCY MEDICINE
Payer: MEDICARE

## 2021-02-03 ENCOUNTER — TELEMEDICINE (OUTPATIENT)
Dept: MEDICAL GROUP | Facility: MEDICAL CENTER | Age: 31
End: 2021-02-03
Payer: MEDICARE

## 2021-02-03 VITALS
RESPIRATION RATE: 20 BRPM | OXYGEN SATURATION: 98 % | HEART RATE: 81 BPM | DIASTOLIC BLOOD PRESSURE: 81 MMHG | SYSTOLIC BLOOD PRESSURE: 134 MMHG

## 2021-02-03 VITALS — BODY MASS INDEX: 39.65 KG/M2 | WEIGHT: 238 LBS | HEIGHT: 65 IN
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
## 2021-02-03 DIAGNOSIS — R56.9 SEIZURE (HCC): ICD-10-CM

## 2021-02-03 DIAGNOSIS — I47.10 SVT (SUPRAVENTRICULAR TACHYCARDIA) (HCC): ICD-10-CM

## 2021-02-03 DIAGNOSIS — S30.1XXA CONTUSION OF FLANK, INITIAL ENCOUNTER: ICD-10-CM

## 2021-02-03 DIAGNOSIS — F44.81 DISSOCIATIVE IDENTITY DISORDER (HCC): ICD-10-CM

## 2021-02-03 DIAGNOSIS — F25.1 SCHIZOAFFECTIVE DISORDER, DEPRESSIVE TYPE (HCC): ICD-10-CM

## 2021-02-03 DIAGNOSIS — H46.9 OPTIC NEURITIS: ICD-10-CM

## 2021-02-03 DIAGNOSIS — T83.511D URINARY TRACT INFECTION ASSOCIATED WITH INDWELLING URETHRAL CATHETER, SUBSEQUENT ENCOUNTER: ICD-10-CM

## 2021-02-03 DIAGNOSIS — G37.3 TRANSVERSE MYELITIS (HCC): ICD-10-CM

## 2021-02-03 DIAGNOSIS — E66.9 DIABETES MELLITUS TYPE 2 IN OBESE: ICD-10-CM

## 2021-02-03 DIAGNOSIS — E11.69 DIABETES MELLITUS TYPE 2 IN OBESE: ICD-10-CM

## 2021-02-03 DIAGNOSIS — N39.0 URINARY TRACT INFECTION ASSOCIATED WITH INDWELLING URETHRAL CATHETER, SUBSEQUENT ENCOUNTER: ICD-10-CM

## 2021-02-03 DIAGNOSIS — E87.6 HYPOKALEMIA: ICD-10-CM

## 2021-02-03 PROBLEM — Z86.69 HISTORY OF OPTIC NEURITIS: Status: RESOLVED | Noted: 2019-12-17 | Resolved: 2021-02-03

## 2021-02-03 PROBLEM — Z86.79 HISTORY OF SUPRAVENTRICULAR TACHYCARDIA: Status: RESOLVED | Noted: 2020-04-20 | Resolved: 2021-02-03

## 2021-02-03 PROBLEM — R69 ILLNESS: Status: RESOLVED | Noted: 2020-12-17 | Resolved: 2021-02-03

## 2021-02-03 PROBLEM — E87.20 METABOLIC ACIDOSIS: Status: RESOLVED | Noted: 2019-08-30 | Resolved: 2021-02-03

## 2021-02-03 LAB
ALBUMIN SERPL BCP-MCNC: 4.2 G/DL (ref 3.2–4.9)
ALBUMIN/GLOB SERPL: 2.3 G/DL
ALP SERPL-CCNC: 73 U/L (ref 30–99)
ALT SERPL-CCNC: 18 U/L (ref 2–50)
ANION GAP SERPL CALC-SCNC: 12 MMOL/L (ref 7–16)
APPEARANCE UR: ABNORMAL
AST SERPL-CCNC: 18 U/L (ref 12–45)
BACTERIA #/AREA URNS HPF: ABNORMAL /HPF
BASOPHILS # BLD AUTO: 1 % (ref 0–1.8)
BASOPHILS # BLD: 0.04 K/UL (ref 0–0.12)
BILIRUB SERPL-MCNC: 0.2 MG/DL (ref 0.1–1.5)
BILIRUB UR QL STRIP.AUTO: NEGATIVE
BUN SERPL-MCNC: 7 MG/DL (ref 8–22)
CALCIUM SERPL-MCNC: 9 MG/DL (ref 8.4–10.2)
CAOX CRY #/AREA URNS HPF: ABNORMAL /HPF
CHLORIDE SERPL-SCNC: 111 MMOL/L (ref 96–112)
CO2 SERPL-SCNC: 16 MMOL/L (ref 20–33)
COLOR UR: YELLOW
CREAT SERPL-MCNC: 0.63 MG/DL (ref 0.5–1.4)
EOSINOPHIL # BLD AUTO: 0.16 K/UL (ref 0–0.51)
EOSINOPHIL NFR BLD: 4 % (ref 0–6.9)
EPI CELLS #/AREA URNS HPF: ABNORMAL /HPF
ERYTHROCYTE [DISTWIDTH] IN BLOOD BY AUTOMATED COUNT: 43.1 FL (ref 35.9–50)
GLOBULIN SER CALC-MCNC: 1.8 G/DL (ref 1.9–3.5)
GLUCOSE SERPL-MCNC: 99 MG/DL (ref 65–99)
GLUCOSE UR STRIP.AUTO-MCNC: NEGATIVE MG/DL
HCG SERPL QL: NEGATIVE
HCT VFR BLD AUTO: 40.2 % (ref 37–47)
HGB BLD-MCNC: 13 G/DL (ref 12–16)
IMM GRANULOCYTES # BLD AUTO: 0.01 K/UL (ref 0–0.11)
IMM GRANULOCYTES NFR BLD AUTO: 0.2 % (ref 0–0.9)
KETONES UR STRIP.AUTO-MCNC: NEGATIVE MG/DL
LEUKOCYTE ESTERASE UR QL STRIP.AUTO: NEGATIVE
LYMPHOCYTES # BLD AUTO: 1.52 K/UL (ref 1–4.8)
LYMPHOCYTES NFR BLD: 37.6 % (ref 22–41)
MCH RBC QN AUTO: 29.3 PG (ref 27–33)
MCHC RBC AUTO-ENTMCNC: 32.3 G/DL (ref 33.6–35)
MCV RBC AUTO: 90.7 FL (ref 81.4–97.8)
MICRO URNS: ABNORMAL
MONOCYTES # BLD AUTO: 0.41 K/UL (ref 0–0.85)
MONOCYTES NFR BLD AUTO: 10.1 % (ref 0–13.4)
MUCOUS THREADS #/AREA URNS HPF: ABNORMAL /HPF
NEUTROPHILS # BLD AUTO: 1.9 K/UL (ref 2–7.15)
NEUTROPHILS NFR BLD: 47.1 % (ref 44–72)
NITRITE UR QL STRIP.AUTO: POSITIVE
NRBC # BLD AUTO: 0 K/UL
NRBC BLD-RTO: 0 /100 WBC
PH UR STRIP.AUTO: 6.5 [PH] (ref 5–8)
PLATELET # BLD AUTO: 186 K/UL (ref 164–446)
PMV BLD AUTO: 11.8 FL (ref 9–12.9)
POTASSIUM SERPL-SCNC: 3.9 MMOL/L (ref 3.6–5.5)
PROT SERPL-MCNC: 6 G/DL (ref 6–8.2)
PROT UR QL STRIP: NEGATIVE MG/DL
RBC # BLD AUTO: 4.43 M/UL (ref 4.2–5.4)
RBC # URNS HPF: ABNORMAL /HPF
RBC UR QL AUTO: ABNORMAL
SODIUM SERPL-SCNC: 139 MMOL/L (ref 135–145)
SP GR UR STRIP.AUTO: 1.02
WBC # BLD AUTO: 4 K/UL (ref 4.8–10.8)
WBC #/AREA URNS HPF: ABNORMAL /HPF

## 2021-02-03 PROCEDURE — 74177 CT ABD & PELVIS W/CONTRAST: CPT

## 2021-02-03 PROCEDURE — 84703 CHORIONIC GONADOTROPIN ASSAY: CPT

## 2021-02-03 PROCEDURE — 99284 EMERGENCY DEPT VISIT MOD MDM: CPT

## 2021-02-03 PROCEDURE — 36415 COLL VENOUS BLD VENIPUNCTURE: CPT

## 2021-02-03 PROCEDURE — 99214 OFFICE O/P EST MOD 30 MIN: CPT | Mod: 95,CR | Performed by: FAMILY MEDICINE

## 2021-02-03 PROCEDURE — 700117 HCHG RX CONTRAST REV CODE 255: Performed by: EMERGENCY MEDICINE

## 2021-02-03 PROCEDURE — 80053 COMPREHEN METABOLIC PANEL: CPT

## 2021-02-03 PROCEDURE — 700102 HCHG RX REV CODE 250 W/ 637 OVERRIDE(OP): Performed by: EMERGENCY MEDICINE

## 2021-02-03 PROCEDURE — 81001 URINALYSIS AUTO W/SCOPE: CPT

## 2021-02-03 PROCEDURE — A9270 NON-COVERED ITEM OR SERVICE: HCPCS | Performed by: EMERGENCY MEDICINE

## 2021-02-03 PROCEDURE — 85025 COMPLETE CBC W/AUTO DIFF WBC: CPT

## 2021-02-03 RX ORDER — ACETAMINOPHEN 325 MG/1
650 TABLET ORAL ONCE
Status: COMPLETED | OUTPATIENT
Start: 2021-02-03 | End: 2021-02-03

## 2021-02-03 RX ORDER — OXYCODONE HYDROCHLORIDE 5 MG/1
5 TABLET ORAL ONCE
Status: COMPLETED | OUTPATIENT
Start: 2021-02-03 | End: 2021-02-03

## 2021-02-03 RX ORDER — DULAGLUTIDE 1.5 MG/.5ML
0.5 INJECTION, SOLUTION SUBCUTANEOUS
Qty: 6 ML | Refills: 3 | Status: SHIPPED | OUTPATIENT
Start: 2021-02-03 | End: 2021-03-05 | Stop reason: SDUPTHER

## 2021-02-03 RX ADMIN — OXYCODONE 5 MG: 5 TABLET ORAL at 17:48

## 2021-02-03 RX ADMIN — IOHEXOL 100 ML: 350 INJECTION, SOLUTION INTRAVENOUS at 18:40

## 2021-02-03 RX ADMIN — ACETAMINOPHEN 650 MG: 325 TABLET ORAL at 19:06

## 2021-02-03 ASSESSMENT — FIBROSIS 4 INDEX: FIB4 SCORE: 0.73
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
## 2021-02-03 NOTE — TELEPHONE ENCOUNTER
"This morning fell out of bed at 0500 and hit her RIGHT side.     Stated her RIGHT kidney hurts.  [place] and the fire department came to her house to put her back into bed.     Stated her suprapubic catheter insertion site tore.  She stated that she see's flecks of blood in her jara bag.  Stated only 50ml of urine had drained in her bag since this morning.  Also stated that her bed was wet as well.     Advised pt to be seen today in the ED.     Reason for Disposition  • SEVERE pain (e.g., excruciating, scale 8-10) and present > 1 hour    Additional Information  • Negative: Passed out (i.e., lost consciousness, collapsed and was not responding)  • Negative: Shock suspected (e.g., cold/pale/clammy skin, too weak to stand, low BP, rapid pulse)  • Negative: Sounds like a life-threatening emergency to the triager  • Negative: Followed a major injury to the back (e.g., MVA, fall > 10 feet or 3 meters, penetrating injury, etc.)  • Negative: Upper, mid or lower back pain that occurs mainly in the midline    Answer Assessment - Initial Assessment Questions  1. LOCATION: \"Where does it hurt?\" (e.g., left, right)      RIGHT  2. ONSET: \"When did the pain start?\"      This morning at 0500  3. SEVERITY: \"How bad is the pain?\" (e.g., Scale 1-10; mild, moderate, or severe)    - MILD (1-3): doesn't interfere with normal activities     - MODERATE (4-7): interferes with normal activities or awakens from sleep     - SEVERE (8-10): excruciating pain and patient unable to do normal activities (stays in bed)        9/10  4. PATTERN: \"Does the pain come and go, or is it constant?\"       Constant  5. CAUSE: \"What do you think is causing the pain?\"      Fell out of bed and landed on her RIGHT side  6. OTHER SYMPTOMS:  \"Do you have any other symptoms?\" (e.g., fever, abdominal pain, vomiting, leg weakness, burning with urination, blood in urine)      Blood in uring  7. PREGNANCY:  \"Is there any chance you are pregnant?\" \"When was your last " "menstrual period?\"      no    Protocols used: FLANK PAIN-A-[place]      "

## 2021-02-03 NOTE — ASSESSMENT & PLAN NOTE
The patient     The patient was admitted   She will finish her macrobid course today. She is receiving home health.         She was

## 2021-02-03 NOTE — PROGRESS NOTES
Subjective:     CC: f/u 10 ER visits and 2 hospitalizations since 12/16/21.     HPI:   Kristin presents today with follow up for 10 ER visits and two hospitalizations since our last appointment on 12/16/2021. She was admitted 12/25/20-12/30/20 for pyelonephritis, urine cx positive for ESBL Klebsiella. Patient received meropenem, suprapubic cath exchanged. Patient has been on several antibiotics since that time, most recently macrobid and is finishing her last day today. She states she does not have a follow up with urology however my medical assistant confirmed she has an appointment on 2/17/2021 at 2:30PM with [facility], patient informed of this scheduled visit.     Kristin was admitted on 1/19/21- 1/22/21 due to fall from her bed with decreased sensation and weakness in bilateral lower extremities. CT head unremarkable, cervical, thoracic, and lumbar CTs completed, no evidence of structural findings; no MRI completed as bladder stimulator incompatible with MRI. LP and CT myelogram unremarkable, neurology was consulted; LE weakness was thought to be secondary to known transverse myelitis. PM&R, Dr. Ko consulted, per his note there was question of symptoms being psychogenic in nature. Patient has an appointment with Dr. Kayla Wise on 2/5/2021.    Transverse Myelitis  Referral to neurology placed at last office visit however patient has been in and out of hospital and ER and has not been able to follow up. She was contacted by [place] [facility] and will follow up on this referral.  Interstim implant placed in 2018 for fecal incontinence; patient states implant is broken; she was scheduled for removal and replacement with Dr. Noyola however this procedure was cancelled presumable secondary to acute recurrent UTI. Suprapubic cath for bladder dysfunction, recurrent infections per above. Patient will follow up with urology.    History of Seizures   Patient reports history of seizures, she is on  topiramate 50mg BID, trileptal has been increased per psychiatry, see below. Again discussed importance of following up with neurology.    SVT  Patient reports history of SVT status post ablation in 2016.  Patient is on ivabradine 7.5 mg twice daily.  This has improved her palpitations.  She saw Dr. Leon on 1/7/2020, no changes to meds except restart potassium.    Schizoaffective disorder  Personality disorder  Dissociative identity disorder  The patient is following with Dr. La Lutz.  Note from 12/16/2020 reviewed.  The patient was instructed to continue fluoxetine 40 mg daily, continue Geodon 40 mg twice daily, increase BuSpar from 10 mg twice daily to 15 mg 3 times daily, increase Trileptal from 150 mg to 300 mg twice daily, increase trazodone from 50mg to 100mg and restart melatonin for insomnia, and start therapy for dissociative identity disorder.    Optic Neuritis  The patient follows with Dr. Yousif, treated with daily cellcept.    Diabetes mellitus type 2  This is a chronic problem.  The patient reports she was diagnosed with diabetes over a year ago.  She was initially on Lantus 15 units at night however reported noncompliance with Lantus at our last appointment.  I do stopped her Lantus and increased her Trulicity from 1.5 mg weekly to 3 mg weekly. HgbA1C on 1/20/21 was 4.6. Patient is trying to improve her diet although this is very difficult as her family has limited financial means and the patient does not have the ability to prepare her own food. She is trying to cut back on soda.    Hypokalemia  Since our last visit I changed the patient's potassium chloride from oral tab to dissolvable powder due to possible interaction between KCl tab and oxybutynin. (anticholinergic effects of oxybutinin lead to slowed gastric emptying increasing risk for irritation and ulceration from solid oral potassium against GI mucosa). Confirmed with patient she is taking new form of  "potassium.        Hypokalemia    Past Medical History:   Diagnosis Date   • Abdominal pain    • Anginal syndrome     random chest pain especially with tachycardia   • Apnea, sleep    • Arrhythmia     \"sinus tachycardia\", cariologist, Dr. Kumar; ablation 2/2016   • Arthritis     osteo   • ASTHMA     when around smoke   • Atrial fibrillation (HCC)    • Back pain    • Borderline personality disorder (HCC)    • Breath shortness     with tachycardia   • Bronchitis    • Cardiac arrhythmia    • Chickenpox    • Chronic obstructive pulmonary disease (HCC)    • Chronic UTI 9/18/2010   • Cough    • Daytime sleepiness    • Depression    • Diabetes (HCC)    • Diarrhea    • Disorder of thyroid    • Fall    • Fatigue    • Frequent headaches    • Gasping for breath    • Gynecological disorder     PCOS   • Headache(784.0)    • Heart burn    • History of falling    • Indigestion    • Migraine    • 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HCC)    • 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HCC)    • Nausea    • Obesity    • Other fatigue 6/29/2020   • Pain 08-15-12    back, 7/10   • Painful joint    • PCOS (polycystic ovarian syndrome)    • Pneumonia 2012   • Psychosis (HCC)    • Ringing in ears    • Scoliosis    • Shortness of breath    • Sinus tachycardia 10/31/2013   • Sleep apnea     CPAP \"pulmonary doctor took me off mid year 2016\"   • Snoring    • Tonsillitis    • 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HCC)    • Tuberculosis     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   • Urinary bladder disorder     Suprapubic cath   • Urinary incontinence    • Weakness    • Wears glasses        Social History     Tobacco Use   • Smoking status: Never Smoker   • Smokeless tobacco: Never Used   Substance Use Topics   • Alcohol use: No     Alcohol/week: 0.0 oz     Frequency: Never     Binge frequency: Never   • Drug use: Not Currently     Frequency: 7.0 times per week     Types: Marijuana       Current Outpatient Medications Ordered in Epic   Medication Sig Dispense Refill   • Dulaglutide " (TRULICITY) 1.5 MG/0.5ML Solution Pen-injector Inject 0.5 mL under the skin every 7 days. 6 mL 3   • esomeprazole magnesium (NEXIUM) 40 MG Pack Take 40 mg by mouth every morning before breakfast.     • busPIRone (BUSPAR) 10 MG Tab tablet Take 10 mg by mouth 3 times a day.     • levothyroxine (SYNTHROID) 100 MCG Tab Take 100 mcg by mouth Every morning on an empty stomach.     • Melatonin 10 MG Tab Take 10 mg by mouth every bedtime.     • potassium chloride (KLOR-CON) 20 MEQ Pack Take 2 Packets by mouth 2 times a day. 120 Packet 3   • fluoxetine (PROZAC) 40 MG capsule Take 1 Cap by mouth every day. 90 Cap 0   • OXcarbazepine (TRILEPTAL) 300 MG Tab Take 1 Tab by mouth 2 times a day. 180 Tab 0   • ziprasidone (GEODON) 40 MG Cap Take 1 tablet by mouth twice a day (Patient taking differently: Take 40 mg by mouth 2 Times a Day. Take 1 tablet by mouth twice a day) 180 Cap 0   • albuterol 108 (90 Base) MCG/ACT Aero Soln inhalation aerosol Inhale 2 Puffs every 6 hours as needed for Shortness of Breath. 8.5 g 6   • Mirabegron ER (MYRBETRIQ) 50 MG TABLET SR 24 HR Take 50 mg by mouth every day. 90 Tab 3   • acetaminophen (TYLENOL) 500 MG Tab Take 1,000 mg by mouth every 6 hours as needed for Moderate Pain.     • sodium bicarbonate 325 MG Tab Take 650 mg by mouth 2 times a day.     • topiramate (TOPAMAX) 50 MG tablet Take 50 mg by mouth 2 times a day.     • ondansetron (ZOFRAN ODT) 4 MG TABLET DISPERSIBLE Take 1 Tab by mouth every 6 hours as needed for Nausea. 10 Tab 0   • ipratropium-albuterol (DUONEB) 0.5-2.5 (3) MG/3ML nebulizer solution Take 3 mL by nebulization every four hours as needed for Shortness of Breath. Nebulizer      • mycophenolate (CELLCEPT) 500 MG tablet Take 2 Tabs by mouth 2 times a day. 60 Tab 2   • methocarbamol (ROBAXIN) 750 MG Tab Take 750 mg by mouth every bedtime. Indications: Musculoskeletal Pain     • calcium carbonate (TUMS EX) 750 MG chewable tablet Take 2 Tabs by mouth every day. (Patient taking  "differently: Chew 1,500 mg 1 time a day as needed.) 60 Tab 0   • vitamin D, Ergocalciferol, (DRISDOL) 1.25 MG (07433 UT) Cap capsule Take 50,000 Units by mouth every 7 days.     • traZODone (DESYREL) 100 MG Tab Take 1 Tab by mouth every bedtime. (Patient taking differently: Take 100 mg by mouth every evening.) 30 Tab 3   • oxybutynin (DITROPAN) 5 MG Tab Take 1 Tab by mouth 3 times a day. 90 Tab 0   • acetaZOLAMIDE SR (DIAMOX) 500 MG CAPSULE SR 12 HR Take 500 mg by mouth 2 times a day.     • ivabradine (CORLANOR) 7.5 MG Tab tablet Take 7.5 mg by mouth 2 times a day, with meals.     • aspirin EC (ECOTRIN) 81 MG Tablet Delayed Response Take 81 mg by mouth every day.       No current [facility]-ordered facility-administered medications on file.        Allergies:  Depakote [divalproex sodium], Amitriptyline, Aripiprazole [abilify], Clindamycin, Flagyl [metronidazole hcl], Flomax [tamsulosin hydrochloride], Levaquin, Metformin, Tape, Vancomycin, Wound dressing adhesive, Ampicillin, Ciprofloxacin, Keflex, Levofloxacin, Metronidazole, Penicillins, Sulfa drugs, Tamsulosin, and Valproic acid    Health Maintenance: patient due for retinal screening, monofilament    ROS:  Gen: no fevers/chills, no changes in weight  Eyes: no changes in vision  ENT: no sore throat, no hearing loss, no bloody nose  Pulm: no SOB  CV: no chest pain      Objective:       Exam:  Ht 1.651 m (5' 5\") Comment: pt. reported  Wt 108 kg (238 lb) Comment: pt. reported  BMI 39.61 kg/m²  Body mass index is 39.61 kg/m².    Constitutional: Alert, patient lying in bed in no distress,   Skin: Warm, dry, good turgor, no rashes in visible areas  Eyes: Equal, round and reactive, conjunctiva clear, no ptosis  ENMT: Lips without lesions, good dentition, moist mucous membranes  Psych: Alert and oriented, normal affect and mood      Assessment & Plan:     30 y.o. female with the following -     1. Urinary tract infection associated with indwelling urethral catheter, " subsequent encounter  This is a chronic problem, recurrent infections, see HPI for details.  - Follow up urology in 2 weeks, confirmed appointment time with [facility] and patient    2. Diabetes mellitus type 2 in obese ([facility])  This is a chronic problem. Most recent HgbA1C 4.6.   - Decrease trulicity to 1.5mg q 7 days  - Discussed the importance of dietary modification, this is very difficult for patient     3. 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[facility])  This is a chronic problem. Encouraged patient to follow up with neurology, referral processed for [place] neurolog[facility].    4. Seizure ([facility])  Patient reports h/o seizure, she is on Topamax 50 mg twice daily as well as Trileptal 300 mg twice daily.  - Encouraged patient to follow up with neurology as soon as possible    5. SVT (supraventricular tachycardia) ([facility])  This is a chronic problem.  Symptoms well controlled with ivabradine.  - Continue current regimen    6. Schizoaffective disorder, depressive type ([facility])  7. Dissociative identity disorder ([facility])  Patient following with Dr. La Lutz, see new medication regimen in HPI.   - Continue mediations per Dr. Lutz  - Follow up with Brie OSEI     8. Optic neuritis  Patient following with Dr. Yousif  - Continue cellcept    9. Hypokalemia  Changed from solid oral tab to dissolvable powder due to med interactions, see HPI. Confirmed new formulation with patient.     Return in about 1 month (around 3/3/2021).    Please note this dictation was created using voice recognition software. I have made every reasonable attempt to correct obvious errors, but I expect there may be errors of grammar, and possibly content, that I did not discover before finalizing the note.

## 2021-02-04 ENCOUNTER — TELEMEDICINE (OUTPATIENT)
Dept: BEHAVIORAL HEALTH | Facility: CLINIC | Age: 31
End: 2021-02-04
Payer: MEDICARE

## 2021-02-04 DIAGNOSIS — F60.3 BORDERLINE PERSONALITY DISORDER IN ADULT (HCC): ICD-10-CM

## 2021-02-04 DIAGNOSIS — F32.5 MAJOR DEPRESSIVE DISORDER WITH SINGLE EPISODE, IN FULL REMISSION (HCC): ICD-10-CM

## 2021-02-04 DIAGNOSIS — F25.1 SCHIZOAFFECTIVE DISORDER, DEPRESSIVE TYPE (HCC): ICD-10-CM

## 2021-02-04 DIAGNOSIS — R56.9 SEIZURE (HCC): ICD-10-CM

## 2021-02-04 DIAGNOSIS — F44.81 DISSOCIATIVE IDENTITY DISORDER (HCC): ICD-10-CM

## 2021-02-04 DIAGNOSIS — F43.10 POSTTRAUMATIC STRESS DISORDER: ICD-10-CM

## 2021-02-04 DIAGNOSIS — F20.9 SCHIZOPHRENIA, UNSPECIFIED TYPE (HCC): ICD-10-CM

## 2021-02-04 LAB — TEST NAME 95000: NORMAL

## 2021-02-04 PROCEDURE — 90791 PSYCH DIAGNOSTIC EVALUATION: CPT | Performed by: MARRIAGE & FAMILY THERAPIST

## 2021-02-04 NOTE — ED TRIAGE NOTES
"Chief Complaint   Patient presents with   • T-5000 FALL      pt sukhdeep STALLWORTH after falling off her bed this morning. She states she hit her right \" kidney\" . Pt also states her suprapubic cathter \" tore\". Denies head injury.   "

## 2021-02-04 NOTE — ED NOTES
ERP at bedside. Pt agrees with plan of care discussed by ERP. AIDET acknowledged with patient. Ebonie in low position, side rail up for pt safety. Call light within reach. Will continue to monitor.

## 2021-02-04 NOTE — ED NOTES
Patient is stable for discharge at this time, anticipatory guidance provided, close follow-up is encouraged, and ED return instructions have been detailed. Patient and family are both agreeable to the disposition and plan and discharged home in ambulatory state and in good condition.

## 2021-02-04 NOTE — ED PROVIDER NOTES
ED Provider Note    CHIEF COMPLAINT  Chief Complaint   Patient presents with   • T-5000 FALL       HPI  Kristin Balderrama is a 30 y.o. female who presents to the emergency room after fall early this morning. She explained that she is not aware of why she fell out of her bed but she did fall from her bed onto the ground early morning. EMS was called and they were able to provide a lift assist back into bed. This afternoon however she had increasing pain to her right flank which is inside that she landed on and therefore she was concerned about possible further injury. She notes that she does have a suprapubic catheter and this also does hurt at the ostomy site. She notes that she has had which she perceives is decreased urine output throughout the day as well. Otherwise no complaints. No difficulty breathing. No abdominal pain.    She is wheelchair mobile secondary to history of transverse myelitis.    REVIEW OF SYSTEMS  See HPI for further details. All other systems are negative.     PAST MEDICAL HISTORY   has a past medical history of Abdominal pain, Anginal syndrome, Apnea, sleep, Arrhythmia, Arthritis, ASTHMA, Atrial fibrillation ([facility]), Back pain, Borderline personality disorder ([facility]), Breath shortness, Bronchitis, Cardiac arrhythmia, Chickenpox, Chronic obstructive pulmonary disease ([facility]), Chronic UTI (9/18/2010), Cough, Daytime sleepiness, Depression, Diabetes ([facility]), Diarrhea, Disorder of thyroid, Fall, Fatigue, Frequent headaches, Gasping for breath, Gynecological disorder, Headache(784.0), Heart burn, History of falling, Indigestion, Migraine, 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[facility]), 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[facility]), Nausea, Obesity, Other fatigue (6/29/2020), Pain (08-15-12), Painful joint, PCOS (polycystic ovarian syndrome), Pneumonia (2012), Psychosis ([facility]), Ringing in ears, Scoliosis, Shortness of breath, Sinus tachycardia (10/31/2013), Sleep apnea, Snoring, Tonsillitis, 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[facility]), Tuberculosis,  "Urinary bladder disorder, Urinary incontinence, Weakness, and Wears glasses.    SOCIAL HISTORY  Social History     Tobacco Use   • Smoking status: Never Smoker   • Smokeless tobacco: Never Used   Substance and Sexual Activity   • Alcohol use: No     Alcohol/week: 0.0 oz     Frequency: Never     Binge frequency: Never   • Drug use: Not Currently     Frequency: 7.0 times per week     Types: Marijuana   • Sexual activity: Not Currently     Birth control/protection: Pill       SURGICAL HISTORY   has a past surgical history that includes neuro dest facet l/s w/ig sngl (4/21/2015); recovery (1/27/2016); [place] by laparoscopy (8/29/2010); lumbar fusion anterior (8/21/2012); other cardiac surgery (1/2016); tonsillectomy; bowel stimulator insertion (7/13/2016); gastroscopy with balloon dilatation (N/A, 1/4/2017); muscle biopsy (Right, 1/26/2017); other abdominal surgery; and laminotomy.    CURRENT MEDICATIONS  Home Medications    **Home medications have not yet been reviewed for this encounter**         ALLERGIES  Allergies   Allergen Reactions   • Depakote [Divalproex Sodium] Unspecified     Muscle spasms/muscle pain and weakness     • Amitriptyline Unspecified     Headaches     • Aripiprazole [Abilify] Unspecified     Headaches/muscle twitching     • Clindamycin Nausea     Even with food     • Flagyl [Metronidazole Hcl] Unspecified     \"eye problems\"     • Flomax [Tamsulosin Hydrochloride] Swelling   • Levaquin Unspecified     Severe muscle cramps in legs  RXN=unknown   • Metformin Unspecified     Increased lactic acid      • Tape Rash     Tears skin off  coban with Tegaderm tape ok intermittently  RXN=ongoing   • Vancomycin Itching     Pt becomes flushed in face and chest.   RXN=7/10/16   • Wound Dressing Adhesive Hives     By pt report   • Ampicillin Rash     Pt reports that she received a rash    • Ciprofloxacin    • Keflex Rash     Pt states she gets a rash with this medication  Tolerates ceftriaxone   • Levofloxacin " Unspecified     Leg muscle cramps   • Metronidazole Rash     .   • Penicillins Hives   • Sulfa Drugs Myalgia     Muscle pain and weakness   • Tamsulosin Swelling   • Valproic Acid Rash     .       PHYSICAL EXAM  VITAL SIGNS: /81   Pulse 81   Resp 20   SpO2 98%  @[id number]::@   Pulse ox interpretation: I interpret this pulse ox as normal.  Constitutional: Alert in no apparent distress.  HENT: No signs of trauma, Bilateral external ears normal, Nose normal.   Eyes: Pupils are equal and reactive  Neck: Normal range of motion, No tenderness, Supple  Cardiovascular: Regular rate and rhythm, no murmurs.   Thorax & Lungs: Normal breath sounds, No respiratory distress, No wheezing, No chest tenderness.   Abdomen: Bowel sounds normal, Soft, No tenderness, overweight.  : suprapubic catheter in place. Ostomy appears well. No signs of trauma. Clear hello urine and tubing.  Skin: Warm, Dry, No erythema, No rash.   Back: No bony tenderness, mild right CVA tenderness.   Extremities: Intact distal pulses, contractor lower extremity secondary to disuse.  Neurologic: Alert , Normal motor function, Normal sensory function, No focal deficits noted.   Psychiatric: Affect normal, Judgment normal, Mood normal.       DIAGNOSTIC STUDIES / PROCEDURES      LABS  Results for orders placed or performed during the hospital encounter of 02/03/21   CBC WITH DIFFERENTIAL   Result Value Ref Range    WBC 4.0 (L) 4.8 - 10.8 K/uL    RBC 4.43 4.20 - 5.40 M/uL    Hemoglobin 13.0 12.0 - 16.0 g/dL    Hematocrit 40.2 37.0 - 47.0 %    MCV 90.7 81.4 - 97.8 fL    MCH 29.3 27.0 - 33.0 pg    MCHC 32.3 (L) 33.6 - 35.0 g/dL    RDW 43.1 35.9 - 50.0 fL    Platelet Count 186 164 - 446 K/uL    MPV 11.8 9.0 - 12.9 fL    Neutrophils-Polys 47.10 44.00 - 72.00 %    Lymphocytes 37.60 22.00 - 41.00 %    Monocytes 10.10 0.00 - 13.40 %    Eosinophils 4.00 0.00 - 6.90 %    Basophils 1.00 0.00 - 1.80 %    Immature Granulocytes 0.20 0.00 - 0.90 %    Nucleated RBC  0.00 /100 WBC    Neutrophils (Absolute) 1.90 (L) 2.00 - 7.15 K/uL    Lymphs (Absolute) 1.52 1.00 - 4.80 K/uL    Monos (Absolute) 0.41 0.00 - 0.85 K/uL    Eos (Absolute) 0.16 0.00 - 0.51 K/uL    Baso (Absolute) 0.04 0.00 - 0.12 K/uL    Immature Granulocytes (abs) 0.01 0.00 - 0.11 K/uL    NRBC (Absolute) 0.00 K/uL   COMP METABOLIC PANEL   Result Value Ref Range    Sodium 139 135 - 145 mmol/L    Potassium 3.9 3.6 - 5.5 mmol/L    Chloride 111 96 - 112 mmol/L    Co2 16 (L) 20 - 33 mmol/L    Anion Gap 12.0 7.0 - 16.0    Glucose 99 65 - 99 mg/dL    Bun 7 (L) 8 - 22 mg/dL    Creatinine 0.63 0.50 - 1.40 mg/dL    Calcium 9.0 8.4 - 10.2 mg/dL    AST(SGOT) 18 12 - 45 U/L    ALT(SGPT) 18 2 - 50 U/L    Alkaline Phosphatase 73 30 - 99 U/L    Total Bilirubin 0.2 0.1 - 1.5 mg/dL    Albumin 4.2 3.2 - 4.9 g/dL    Total Protein 6.0 6.0 - 8.2 g/dL    Globulin 1.8 (L) 1.9 - 3.5 g/dL    A-G Ratio 2.3 g/dL   HCG QUAL SERUM   Result Value Ref Range    Beta-Hcg Qualitative Serum Negative Negative   URINALYSIS,CULTURE IF INDICATED    Specimen: Urine, Suprapubic   Result Value Ref Range    Color Yellow     Character Hazy (A)     Specific Gravity 1.025 <1.035    Ph 6.5 5.0 - 8.0    Glucose Negative Negative mg/dL    Ketones Negative Negative mg/dL    Protein Negative Negative mg/dL    Bilirubin Negative Negative    Nitrite Positive (A) Negative    Leukocyte Esterase Negative Negative    Occult Blood Trace (A) Negative    Micro Urine Req Microscopic    ESTIMATED GFR   Result Value Ref Range    GFR If African American >60 >60 mL/min/1.73 m 2    GFR If Non African American >60 >60 mL/min/1.73 m 2   URINE MICROSCOPIC (W/UA)   Result Value Ref Range    WBC 2-5 /hpf    RBC 2-5 (A) /hpf    Bacteria Few (A) None /hpf    Epithelial Cells Few Few /hpf    Mucous Threads Moderate /hpf    Ca Oxalate Crystal Moderate /hpf     *Note: Due to a large number of results and/or encounters for the requested time period, some results have not been displayed. A  complete set of results can be found in Results Review.         RADIOLOGY  CT-ABDOMEN-PELVIS WITH   Final Result      1.  No acute abnormality within the abdomen or pelvis      2.  Suprapubic catheter present and appears appropriately located      3.  3.5 cm right ovarian cyst      4.  Pelvic device wire present      5.  Hepatic steatosis and hepatomegaly      6.  Prior cholecystectomy. No biliary dilation.              COURSE & MEDICAL DECISION MAKING  Pertinent Labs & Imaging studies reviewed. (See chart for details)  30-year-old presented to the emergency department after fall. Evaluation today is benign. I believe that she likely has a flight contusion secondary to the fall from the bed. No evidence of rib fracture or liver or renal laceration. Urinalysis does not show evidence of significant hematuria to otherwise be concerned about significant renal trauma. Furthermore no evidence of ongoing infection she is currently being treated for. Will discharge the home with return precautions understood.    The patient will not drink alcohol nor drive with prescribed medications. The patient will return for worsening symptoms and is stable at the time of discharge. The patient verbalizes understanding and will comply.    FINAL IMPRESSION  1. Fall, initial encounter    2. Contusion of flank, initial encounter            Electronically signed by: Brian Slater M.D., 2/3/2021 5:13 PM

## 2021-02-05 ENCOUNTER — OFFICE VISIT (OUTPATIENT)
Dept: PHYSICAL MEDICINE AND REHAB | Facility: REHABILITATION | Age: 31
End: 2021-02-05
Payer: MEDICARE

## 2021-02-05 ENCOUNTER — NURSE TRIAGE (OUTPATIENT)
Dept: HEALTH INFORMATION MANAGEMENT | Facility: OTHER | Age: 31
End: 2021-02-05

## 2021-02-05 ENCOUNTER — HOSPITAL ENCOUNTER (OUTPATIENT)
Facility: MEDICAL CENTER | Age: 31
End: 2021-02-06
Attending: EMERGENCY MEDICINE | Admitting: STUDENT IN AN ORGANIZED HEALTH CARE EDUCATION/TRAINING PROGRAM
Payer: MEDICARE

## 2021-02-05 VITALS
HEART RATE: 93 BPM | SYSTOLIC BLOOD PRESSURE: 102 MMHG | BODY MASS INDEX: 37.49 KG/M2 | TEMPERATURE: 99.9 F | DIASTOLIC BLOOD PRESSURE: 70 MMHG | HEIGHT: 65 IN | OXYGEN SATURATION: 98 % | WEIGHT: 225 LBS

## 2021-02-05 DIAGNOSIS — Z78.9 IMPAIRED MOBILITY AND ACTIVITIES OF DAILY LIVING: Primary | ICD-10-CM

## 2021-02-05 DIAGNOSIS — M79.2 NEUROPATHIC PAIN: ICD-10-CM

## 2021-02-05 DIAGNOSIS — G63 POLYNEUROPATHY ASSOCIATED WITH UNDERLYING DISEASE (HCC): ICD-10-CM

## 2021-02-05 DIAGNOSIS — R25.2 SPASTICITY: ICD-10-CM

## 2021-02-05 DIAGNOSIS — G37.3 TRANSVERSE MYELITIS (HCC): ICD-10-CM

## 2021-02-05 DIAGNOSIS — Z74.09 IMPAIRED MOBILITY AND ACTIVITIES OF DAILY LIVING: Primary | ICD-10-CM

## 2021-02-05 PROBLEM — T83.010A SUPRAPUBIC CATHETER DYSFUNCTION (HCC): Status: ACTIVE | Noted: 2021-02-05

## 2021-02-05 LAB
ALBUMIN SERPL BCP-MCNC: 3.9 G/DL (ref 3.2–4.9)
ALBUMIN/GLOB SERPL: 2.2 G/DL
ALP SERPL-CCNC: 67 U/L (ref 30–99)
ALT SERPL-CCNC: 18 U/L (ref 2–50)
ANION GAP SERPL CALC-SCNC: 12 MMOL/L (ref 7–16)
APPEARANCE UR: CLEAR
AST SERPL-CCNC: 18 U/L (ref 12–45)
BASOPHILS # BLD AUTO: 1.3 % (ref 0–1.8)
BASOPHILS # BLD: 0.05 K/UL (ref 0–0.12)
BILIRUB SERPL-MCNC: 0.2 MG/DL (ref 0.1–1.5)
BILIRUB UR QL STRIP.AUTO: NEGATIVE
BUN SERPL-MCNC: 8 MG/DL (ref 8–22)
CALCIUM SERPL-MCNC: 8.8 MG/DL (ref 8.5–10.5)
CAOX CRY #/AREA URNS HPF: NORMAL /HPF
CHLORIDE SERPL-SCNC: 116 MMOL/L (ref 96–112)
CO2 SERPL-SCNC: 15 MMOL/L (ref 20–33)
COLOR UR: YELLOW
CREAT SERPL-MCNC: 0.57 MG/DL (ref 0.5–1.4)
EOSINOPHIL # BLD AUTO: 0.13 K/UL (ref 0–0.51)
EOSINOPHIL NFR BLD: 3.3 % (ref 0–6.9)
EPI CELLS #/AREA URNS HPF: NEGATIVE /HPF
ERYTHROCYTE [DISTWIDTH] IN BLOOD BY AUTOMATED COUNT: 44.1 FL (ref 35.9–50)
GLOBULIN SER CALC-MCNC: 1.8 G/DL (ref 1.9–3.5)
GLUCOSE SERPL-MCNC: 107 MG/DL (ref 65–99)
GLUCOSE UR STRIP.AUTO-MCNC: NEGATIVE MG/DL
GRAM STN SPEC: NORMAL
HCT VFR BLD AUTO: 39.3 % (ref 37–47)
HGB BLD-MCNC: 12.6 G/DL (ref 12–16)
IMM GRANULOCYTES # BLD AUTO: 0.01 K/UL (ref 0–0.11)
IMM GRANULOCYTES NFR BLD AUTO: 0.3 % (ref 0–0.9)
KETONES UR STRIP.AUTO-MCNC: ABNORMAL MG/DL
LACTATE BLD-SCNC: 1.6 MMOL/L (ref 0.5–2)
LACTATE BLD-SCNC: 2.4 MMOL/L (ref 0.5–2)
LEUKOCYTE ESTERASE UR QL STRIP.AUTO: NEGATIVE
LYMPHOCYTES # BLD AUTO: 1.39 K/UL (ref 1–4.8)
LYMPHOCYTES NFR BLD: 35.1 % (ref 22–41)
MCH RBC QN AUTO: 29.3 PG (ref 27–33)
MCHC RBC AUTO-ENTMCNC: 32.1 G/DL (ref 33.6–35)
MCV RBC AUTO: 91.4 FL (ref 81.4–97.8)
MICRO URNS: ABNORMAL
MONOCYTES # BLD AUTO: 0.39 K/UL (ref 0–0.85)
MONOCYTES NFR BLD AUTO: 9.8 % (ref 0–13.4)
NEUTROPHILS # BLD AUTO: 1.99 K/UL (ref 2–7.15)
NEUTROPHILS NFR BLD: 50.2 % (ref 44–72)
NITRITE UR QL STRIP.AUTO: NEGATIVE
NRBC # BLD AUTO: 0 K/UL
NRBC BLD-RTO: 0 /100 WBC
PH UR STRIP.AUTO: 7 [PH] (ref 5–8)
PLATELET # BLD AUTO: 185 K/UL (ref 164–446)
PMV BLD AUTO: 12.5 FL (ref 9–12.9)
POTASSIUM SERPL-SCNC: 3.5 MMOL/L (ref 3.6–5.5)
PROT SERPL-MCNC: 5.7 G/DL (ref 6–8.2)
PROT UR QL STRIP: NEGATIVE MG/DL
RBC # BLD AUTO: 4.3 M/UL (ref 4.2–5.4)
RBC UR QL AUTO: ABNORMAL
RENAL EPI CELLS #/AREA URNS HPF: NEGATIVE /HPF
SARS-COV-2 RNA RESP QL NAA+PROBE: NOTDETECTED
SIGNIFICANT IND 70042: NORMAL
SITE SITE: NORMAL
SODIUM SERPL-SCNC: 143 MMOL/L (ref 135–145)
SOURCE SOURCE: NORMAL
SP GR UR STRIP.AUTO: 1.02
SPECIMEN SOURCE: NORMAL
UROBILINOGEN UR STRIP.AUTO-MCNC: 0.2 MG/DL
WBC # BLD AUTO: 4 K/UL (ref 4.8–10.8)

## 2021-02-05 PROCEDURE — 87070 CULTURE OTHR SPECIMN AEROBIC: CPT

## 2021-02-05 PROCEDURE — 700102 HCHG RX REV CODE 250 W/ 637 OVERRIDE(OP): Performed by: EMERGENCY MEDICINE

## 2021-02-05 PROCEDURE — 87086 URINE CULTURE/COLONY COUNT: CPT

## 2021-02-05 PROCEDURE — 700111 HCHG RX REV CODE 636 W/ 250 OVERRIDE (IP): Performed by: STUDENT IN AN ORGANIZED HEALTH CARE EDUCATION/TRAINING PROGRAM

## 2021-02-05 PROCEDURE — A9270 NON-COVERED ITEM OR SERVICE: HCPCS | Performed by: EMERGENCY MEDICINE

## 2021-02-05 PROCEDURE — 96366 THER/PROPH/DIAG IV INF ADDON: CPT | Mod: XU

## 2021-02-05 PROCEDURE — 700102 HCHG RX REV CODE 250 W/ 637 OVERRIDE(OP): Performed by: STUDENT IN AN ORGANIZED HEALTH CARE EDUCATION/TRAINING PROGRAM

## 2021-02-05 PROCEDURE — U0003 INFECTIOUS AGENT DETECTION BY NUCLEIC ACID (DNA OR RNA); SEVERE ACUTE RESPIRATORY SYNDROME CORONAVIRUS 2 (SARS-COV-2) (CORONAVIRUS DISEASE [COVID-19]), AMPLIFIED PROBE TECHNIQUE, MAKING USE OF HIGH THROUGHPUT TECHNOLOGIES AS DESCRIBED BY CMS-2020-01-R: HCPCS

## 2021-02-05 PROCEDURE — 87205 SMEAR GRAM STAIN: CPT

## 2021-02-05 PROCEDURE — 87077 CULTURE AEROBIC IDENTIFY: CPT

## 2021-02-05 PROCEDURE — G0378 HOSPITAL OBSERVATION PER HR: HCPCS

## 2021-02-05 PROCEDURE — 700105 HCHG RX REV CODE 258: Performed by: EMERGENCY MEDICINE

## 2021-02-05 PROCEDURE — A9270 NON-COVERED ITEM OR SERVICE: HCPCS | Performed by: STUDENT IN AN ORGANIZED HEALTH CARE EDUCATION/TRAINING PROGRAM

## 2021-02-05 PROCEDURE — U0005 INFEC AGEN DETEC AMPLI PROBE: HCPCS

## 2021-02-05 PROCEDURE — 96375 TX/PRO/DX INJ NEW DRUG ADDON: CPT | Mod: XU

## 2021-02-05 PROCEDURE — 96376 TX/PRO/DX INJ SAME DRUG ADON: CPT | Mod: XU

## 2021-02-05 PROCEDURE — 85025 COMPLETE CBC W/AUTO DIFF WBC: CPT

## 2021-02-05 PROCEDURE — 87040 BLOOD CULTURE FOR BACTERIA: CPT

## 2021-02-05 PROCEDURE — 83605 ASSAY OF LACTIC ACID: CPT | Mod: 91

## 2021-02-05 PROCEDURE — 99285 EMERGENCY DEPT VISIT HI MDM: CPT

## 2021-02-05 PROCEDURE — 81001 URINALYSIS AUTO W/SCOPE: CPT

## 2021-02-05 PROCEDURE — 700105 HCHG RX REV CODE 258: Performed by: STUDENT IN AN ORGANIZED HEALTH CARE EDUCATION/TRAINING PROGRAM

## 2021-02-05 PROCEDURE — 700111 HCHG RX REV CODE 636 W/ 250 OVERRIDE (IP): Performed by: EMERGENCY MEDICINE

## 2021-02-05 PROCEDURE — 51705 CHANGE OF BLADDER TUBE: CPT

## 2021-02-05 PROCEDURE — 99220 PR INITIAL OBSERVATION CARE,LEVL III: CPT | Performed by: STUDENT IN AN ORGANIZED HEALTH CARE EDUCATION/TRAINING PROGRAM

## 2021-02-05 PROCEDURE — 96365 THER/PROPH/DIAG IV INF INIT: CPT | Mod: XU

## 2021-02-05 PROCEDURE — 99215 OFFICE O/P EST HI 40 MIN: CPT | Performed by: PHYSICAL MEDICINE & REHABILITATION

## 2021-02-05 PROCEDURE — 80053 COMPREHEN METABOLIC PANEL: CPT

## 2021-02-05 PROCEDURE — 87186 SC STD MICRODIL/AGAR DIL: CPT

## 2021-02-05 RX ORDER — MORPHINE SULFATE 4 MG/ML
2 INJECTION, SOLUTION INTRAMUSCULAR; INTRAVENOUS
Status: DISCONTINUED | OUTPATIENT
Start: 2021-02-05 | End: 2021-02-06 | Stop reason: HOSPADM

## 2021-02-05 RX ORDER — BUSPIRONE HYDROCHLORIDE 10 MG/1
10 TABLET ORAL 3 TIMES DAILY
Status: DISCONTINUED | OUTPATIENT
Start: 2021-02-05 | End: 2021-02-06 | Stop reason: HOSPADM

## 2021-02-05 RX ORDER — DIPHENHYDRAMINE HYDROCHLORIDE 50 MG/ML
25 INJECTION INTRAMUSCULAR; INTRAVENOUS EVERY 6 HOURS PRN
Status: DISCONTINUED | OUTPATIENT
Start: 2021-02-05 | End: 2021-02-06 | Stop reason: HOSPADM

## 2021-02-05 RX ORDER — LEVOTHYROXINE SODIUM 0.1 MG/1
100 TABLET ORAL
Status: DISCONTINUED | OUTPATIENT
Start: 2021-02-06 | End: 2021-02-06 | Stop reason: HOSPADM

## 2021-02-05 RX ORDER — SODIUM CHLORIDE 9 MG/ML
INJECTION, SOLUTION INTRAVENOUS CONTINUOUS
Status: DISCONTINUED | OUTPATIENT
Start: 2021-02-06 | End: 2021-02-06

## 2021-02-05 RX ORDER — MYCOPHENOLATE MOFETIL 250 MG/1
1000 CAPSULE ORAL 2 TIMES DAILY
Status: DISCONTINUED | OUTPATIENT
Start: 2021-02-05 | End: 2021-02-06 | Stop reason: HOSPADM

## 2021-02-05 RX ORDER — MORPHINE SULFATE 4 MG/ML
3 INJECTION, SOLUTION INTRAMUSCULAR; INTRAVENOUS
Status: DISCONTINUED | OUTPATIENT
Start: 2021-02-05 | End: 2021-02-05

## 2021-02-05 RX ORDER — POTASSIUM CHLORIDE 7.45 MG/ML
10 INJECTION INTRAVENOUS
Status: DISCONTINUED | OUTPATIENT
Start: 2021-02-05 | End: 2021-02-05

## 2021-02-05 RX ORDER — DULAGLUTIDE 3 MG/.5ML
INJECTION, SOLUTION SUBCUTANEOUS
COMMUNITY
Start: 2021-02-04 | End: 2021-02-05

## 2021-02-05 RX ORDER — FLUOXETINE HYDROCHLORIDE 20 MG/1
40 CAPSULE ORAL DAILY
Refills: 0 | Status: DISCONTINUED | OUTPATIENT
Start: 2021-02-06 | End: 2021-02-06 | Stop reason: HOSPADM

## 2021-02-05 RX ORDER — ACETAMINOPHEN 325 MG/1
975 TABLET ORAL ONCE
Status: COMPLETED | OUTPATIENT
Start: 2021-02-05 | End: 2021-02-05

## 2021-02-05 RX ORDER — ONDANSETRON 4 MG/1
4 TABLET, ORALLY DISINTEGRATING ORAL EVERY 6 HOURS PRN
Status: DISCONTINUED | OUTPATIENT
Start: 2021-02-05 | End: 2021-02-06 | Stop reason: HOSPADM

## 2021-02-05 RX ORDER — TOPIRAMATE 25 MG/1
50 TABLET ORAL 2 TIMES DAILY
Status: DISCONTINUED | OUTPATIENT
Start: 2021-02-05 | End: 2021-02-06 | Stop reason: HOSPADM

## 2021-02-05 RX ORDER — IPRATROPIUM BROMIDE AND ALBUTEROL SULFATE 2.5; .5 MG/3ML; MG/3ML
3 SOLUTION RESPIRATORY (INHALATION) EVERY 4 HOURS PRN
Status: DISCONTINUED | OUTPATIENT
Start: 2021-02-05 | End: 2021-02-06 | Stop reason: HOSPADM

## 2021-02-05 RX ORDER — POTASSIUM CHLORIDE 20 MEQ/1
40 TABLET, EXTENDED RELEASE ORAL 2 TIMES DAILY
Status: DISCONTINUED | OUTPATIENT
Start: 2021-02-06 | End: 2021-02-06 | Stop reason: HOSPADM

## 2021-02-05 RX ORDER — NITROFURANTOIN 25; 75 MG/1; MG/1
100 CAPSULE ORAL 2 TIMES DAILY
COMMUNITY
End: 2021-03-05

## 2021-02-05 RX ORDER — OXCARBAZEPINE 300 MG/1
300 TABLET, FILM COATED ORAL 2 TIMES DAILY
Status: DISCONTINUED | OUTPATIENT
Start: 2021-02-05 | End: 2021-02-06 | Stop reason: HOSPADM

## 2021-02-05 RX ORDER — POTASSIUM CHLORIDE 1.5 G/1.58G
40 POWDER, FOR SOLUTION ORAL 2 TIMES DAILY
Status: DISCONTINUED | OUTPATIENT
Start: 2021-02-05 | End: 2021-02-05

## 2021-02-05 RX ORDER — ZIPRASIDONE HYDROCHLORIDE 40 MG/1
40 CAPSULE ORAL 2 TIMES DAILY
Status: DISCONTINUED | OUTPATIENT
Start: 2021-02-05 | End: 2021-02-06 | Stop reason: HOSPADM

## 2021-02-05 RX ORDER — ACETAZOLAMIDE 500 MG/1
500 CAPSULE, EXTENDED RELEASE ORAL 2 TIMES DAILY
Status: DISCONTINUED | OUTPATIENT
Start: 2021-02-05 | End: 2021-02-06 | Stop reason: HOSPADM

## 2021-02-05 RX ORDER — PREGABALIN 300 MG/1
300 CAPSULE ORAL 2 TIMES DAILY
Qty: 60 CAP | Refills: 1 | Status: SHIPPED | OUTPATIENT
Start: 2021-02-05 | End: 2021-04-05 | Stop reason: SDUPTHER

## 2021-02-05 RX ORDER — ALBUTEROL SULFATE 90 UG/1
2 AEROSOL, METERED RESPIRATORY (INHALATION) EVERY 6 HOURS PRN
Status: DISCONTINUED | OUTPATIENT
Start: 2021-02-05 | End: 2021-02-06 | Stop reason: HOSPADM

## 2021-02-05 RX ORDER — ACETAMINOPHEN 325 MG/1
650 TABLET ORAL EVERY 6 HOURS PRN
Status: DISCONTINUED | OUTPATIENT
Start: 2021-02-05 | End: 2021-02-06 | Stop reason: HOSPADM

## 2021-02-05 RX ORDER — MORPHINE SULFATE 4 MG/ML
4 INJECTION, SOLUTION INTRAMUSCULAR; INTRAVENOUS ONCE
Status: COMPLETED | OUTPATIENT
Start: 2021-02-05 | End: 2021-02-05

## 2021-02-05 RX ORDER — OXYBUTYNIN CHLORIDE 5 MG/1
5 TABLET ORAL 3 TIMES DAILY
Status: DISCONTINUED | OUTPATIENT
Start: 2021-02-05 | End: 2021-02-06 | Stop reason: HOSPADM

## 2021-02-05 RX ORDER — SODIUM BICARBONATE 650 MG/1
650 TABLET ORAL 2 TIMES DAILY
Status: DISCONTINUED | OUTPATIENT
Start: 2021-02-05 | End: 2021-02-06 | Stop reason: HOSPADM

## 2021-02-05 RX ORDER — BACLOFEN 10 MG/1
10 TABLET ORAL
Qty: 30 TAB | Refills: 1 | Status: SHIPPED | OUTPATIENT
Start: 2021-02-05 | End: 2021-04-05 | Stop reason: SDUPTHER

## 2021-02-05 RX ORDER — CHOLECALCIFEROL (VITAMIN D3) 125 MCG
10 CAPSULE ORAL NIGHTLY
Status: DISCONTINUED | OUTPATIENT
Start: 2021-02-05 | End: 2021-02-06 | Stop reason: HOSPADM

## 2021-02-05 RX ORDER — DIPHENHYDRAMINE HYDROCHLORIDE 50 MG/ML
25 INJECTION INTRAMUSCULAR; INTRAVENOUS ONCE
Status: COMPLETED | OUTPATIENT
Start: 2021-02-05 | End: 2021-02-05

## 2021-02-05 RX ADMIN — IVABRADINE 7.5 MG: 7.5 TABLET, FILM COATED ORAL at 22:44

## 2021-02-05 RX ADMIN — DIPHENHYDRAMINE HYDROCHLORIDE 25 MG: 50 INJECTION INTRAMUSCULAR; INTRAVENOUS at 18:46

## 2021-02-05 RX ADMIN — Medication 10 MG: at 23:18

## 2021-02-05 RX ADMIN — ACETAMINOPHEN 975 MG: 325 TABLET, FILM COATED ORAL at 19:31

## 2021-02-05 RX ADMIN — TOPIRAMATE 50 MG: 25 TABLET, FILM COATED ORAL at 22:44

## 2021-02-05 RX ADMIN — OXCARBAZEPINE 300 MG: 300 TABLET, FILM COATED ORAL at 22:44

## 2021-02-05 RX ADMIN — SODIUM CHLORIDE: 9 INJECTION, SOLUTION INTRAVENOUS at 23:39

## 2021-02-05 RX ADMIN — POTASSIUM CHLORIDE 40 MEQ: 1500 TABLET, EXTENDED RELEASE ORAL at 23:52

## 2021-02-05 RX ADMIN — BUSPIRONE HYDROCHLORIDE 10 MG: 10 TABLET ORAL at 22:44

## 2021-02-05 RX ADMIN — SODIUM BICARBONATE 650 MG: 650 TABLET ORAL at 22:44

## 2021-02-05 RX ADMIN — VANCOMYCIN HYDROCHLORIDE 2500 MG: 500 INJECTION, POWDER, LYOPHILIZED, FOR SOLUTION INTRAVENOUS at 18:47

## 2021-02-05 RX ADMIN — OXYBUTYNIN CHLORIDE 5 MG: 5 TABLET ORAL at 22:44

## 2021-02-05 RX ADMIN — MYCOPHENOLATE MOFETIL 1000 MG: 250 CAPSULE ORAL at 22:44

## 2021-02-05 RX ADMIN — POTASSIUM CHLORIDE 10 MEQ: 7.46 INJECTION, SOLUTION INTRAVENOUS at 23:10

## 2021-02-05 RX ADMIN — ZIPRASIDONE HYDROCHLORIDE 40 MG: 40 CAPSULE ORAL at 22:44

## 2021-02-05 RX ADMIN — DIPHENHYDRAMINE HYDROCHLORIDE 25 MG: 50 INJECTION INTRAMUSCULAR; INTRAVENOUS at 21:29

## 2021-02-05 RX ADMIN — MORPHINE SULFATE 2 MG: 4 INJECTION INTRAVENOUS at 21:29

## 2021-02-05 RX ADMIN — ACETAZOLAMIDE 500 MG: 500 CAPSULE, EXTENDED RELEASE ORAL at 22:44

## 2021-02-05 RX ADMIN — MORPHINE SULFATE 4 MG: 4 INJECTION INTRAVENOUS at 17:49

## 2021-02-05 ASSESSMENT — ENCOUNTER SYMPTOMS
FALLS: 0
SORE THROAT: 0
MEMORY LOSS: 0
SHORTNESS OF BREATH: 0
WHEEZING: 0
PALPITATIONS: 0
ABDOMINAL PAIN: 0
WEAKNESS: 1
DIARRHEA: 0
COUGH: 0
BLOOD IN STOOL: 0
CHILLS: 1
BLURRED VISION: 0
COUGH: 0
EYE PAIN: 0
HEMOPTYSIS: 0
WEIGHT LOSS: 0
SORE THROAT: 0
DIARRHEA: 0
MYALGIAS: 0
PALPITATIONS: 0
WEAKNESS: 0
CONSTIPATION: 0
LOSS OF CONSCIOUSNESS: 0
DIZZINESS: 0
CHILLS: 0
BRUISES/BLEEDS EASILY: 0
FEVER: 0
ORTHOPNEA: 0
SENSORY CHANGE: 1
FEVER: 1
SHORTNESS OF BREATH: 0
NAUSEA: 0
VOMITING: 0
CONSTIPATION: 0

## 2021-02-05 ASSESSMENT — LIFESTYLE VARIABLES
HOW MANY TIMES IN THE PAST YEAR HAVE YOU HAD 5 OR MORE DRINKS IN A DAY: 0
TOTAL SCORE: 0
ON A TYPICAL DAY WHEN YOU DRINK ALCOHOL HOW MANY DRINKS DO YOU HAVE: 0
HAVE PEOPLE ANNOYED YOU BY CRITICIZING YOUR DRINKING: NO
CONSUMPTION TOTAL: NEGATIVE
DOES PATIENT WANT TO STOP DRINKING: NO
HAVE YOU EVER FELT YOU SHOULD CUT DOWN ON YOUR DRINKING: NO
ALCOHOL_USE: NO
EVER HAD A DRINK FIRST THING IN THE MORNING TO STEADY YOUR NERVES TO GET RID OF A HANGOVER: NO
AVERAGE NUMBER OF DAYS PER WEEK YOU HAVE A DRINK CONTAINING ALCOHOL: 0
EVER FELT BAD OR GUILTY ABOUT YOUR DRINKING: NO

## 2021-02-05 ASSESSMENT — FIBROSIS 4 INDEX
FIB4 SCORE: .7071067811865475243
FIB4 SCORE: .7071067811865475243
FIB4 SCORE: 0.71

## 2021-02-05 ASSESSMENT — PATIENT HEALTH QUESTIONNAIRE - PHQ9
5. POOR APPETITE OR OVEREATING: NOT AT ALL
2. FEELING DOWN, DEPRESSED, IRRITABLE, OR HOPELESS: SEVERAL DAYS
9. THOUGHTS THAT YOU WOULD BE BETTER OFF DEAD, OR OF HURTING YOURSELF: NOT AT ALL
CLINICAL INTERPRETATION OF PHQ2 SCORE: 0
1. LITTLE INTEREST OR PLEASURE IN DOING THINGS: NOT AT ALL
3. TROUBLE FALLING OR STAYING ASLEEP OR SLEEPING TOO MUCH: NOT AT ALL
7. TROUBLE CONCENTRATING ON THINGS, SUCH AS READING THE NEWSPAPER OR WATCHING TELEVISION: NOT AT ALL
SUM OF ALL RESPONSES TO PHQ QUESTIONS 1-9: 2
4. FEELING TIRED OR HAVING LITTLE ENERGY: SEVERAL DAYS
8. MOVING OR SPEAKING SO SLOWLY THAT OTHER PEOPLE COULD HAVE NOTICED. OR THE OPPOSITE, BEING SO FIGETY OR RESTLESS THAT YOU HAVE BEEN MOVING AROUND A LOT MORE THAN USUAL: NOT AT ALL
SUM OF ALL RESPONSES TO PHQ9 QUESTIONS 1 AND 2: 1
6. FEELING BAD ABOUT YOURSELF - OR THAT YOU ARE A FAILURE OR HAVE LET YOURSELF OR YOUR FAMILY DOWN: NOT AL ALL

## 2021-02-05 ASSESSMENT — PAIN DESCRIPTION - PAIN TYPE: TYPE: ACUTE PAIN

## 2021-02-05 NOTE — PROGRESS NOTES
"[facility]  [facility]  [address] [place] [address]  Ph: [phone]    NEW PATIENT EVALUATION    *Patient established in PM&R practice, however, patient new to writer as patient is transferring care. Therefore, patient billed as established.     Patient Name: Kristin Balderrama   Patient : 1990  Patient Age: 30 y.o.     Examining Physician: Dr. Kayla Wise, DO    PM&R History to Date - Background Information:  Dates of Admission/Discharge to ARU: None    SUBJECTIVE:   Patient Identification: Kristin Balderrama is a 30 y.o. 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  - Function: At home usually bed bound for the majority of the day. Will walk occasionally during the day. Uses wheelchair to get around in the community.   - Occupation: Fully disabled.   - Driving Status: Not driving  - Bladder/Bowel: Has bowel/bladder stimulator for several years preceding dx of TM because they were uncertain what was happening. Also had back surgery thinking that was source because MRI had shown \"back collapsed\". Stimulator currently broken for past year.   - Records reviewed: This was placed 2016 nu Dr. Joe Noyola. Recently missed appnt to have " "looked at.   - Managing bladder with SPC. Urologist she was seeing is retired. Established with [facility] [place]. On oxybutynin and Myrbetriq.  - Established with GI, will be getting endoscopy soon and colonoscopy. Losing weight without trying. Has no control over bowel movements at all. Has no rectal tone per patient. Has tried pelvic floor therapy without issue. Has completely loose and watery BMs, sometimes with undigested pills. This has been ongoing for years. States last had solid BMs prior to cholecystectomy. This was many years ago.   - Has HH but nursing for SPC change only. Not getting HH therapy.   - Walks \"like a drunkard\" and sometimes feels alive and sometimes dead. No change in BLE recently, this has been going on a long time - not new.   - Has pain from thighs down into feet. Has peripheral neuropathy. Patient is so uncomfortable and has trouble sleeping due to spasticity. Does describe clonus and triggering of spasticity at night which bothers her sleep.    - Has never taken anti-spasticity medication.   - The pain that she has changes, goes from extreme electricity sensation to spasming. Sometimes it just feels like stabbing all of the time. Has tried Lyrica in the past. States this was taken off while at hospital. Had been on Gabapentin 300mg QHS, on Lyrica 300mg BID. Had titrated Gabapentin up and Lyrica is more efficacious.     Review of Systems:  Review of Systems   Constitutional: Negative for chills and fever.   HENT: Negative for congestion and sore throat.    Respiratory: Negative for cough and shortness of breath.    Cardiovascular: Negative for palpitations and leg swelling.   Gastrointestinal: Negative for constipation and diarrhea.        Baseline fecal incontinence.   Genitourinary:        Baseline urinary retention managed with SPC.   Musculoskeletal: Negative for falls and joint pain.   Skin:        Denies any significant skin breakdown.   Neurological: Positive for sensory change and " "weakness.        Positive clonus, positive spasticity, positive neuropathic pain   Endo/Heme/Allergies: Does not bruise/bleed easily.   Psychiatric/Behavioral: Negative for memory loss.      All other pertinent positive review of systems are noted above in HPI.   All other systems reviewed and are negative.    Past Medical History:  Past Medical History:   Diagnosis Date   • Other fatigue 6/29/2020   • Sinus tachycardia 10/31/2013   • Pain 08-15-12    back, 7/10   • Pneumonia 2012   • Chronic UTI 9/18/2010   • Abdominal pain    • Anginal syndrome     random chest pain especially with tachycardia   • Apnea, sleep    • Arrhythmia     \"sinus tachycardia\", cariologist, Dr. Kumar; ablation 2/2016   • Arthritis     osteo   • ASTHMA     when around smoke   • Atrial fibrillation (HCC)    • Back pain    • Borderline personality disorder (HCC)    • Breath shortness     with tachycardia   • Bronchitis    • Cardiac arrhythmia    • Chickenpox    • Chronic obstructive pulmonary disease (HCC)    • Cough    • Daytime sleepiness    • Depression    • Diabetes (HCC)    • Diarrhea    • Disorder of thyroid    • Fall    • Fatigue    • Frequent headaches    • Gasping for breath    • Gynecological disorder     PCOS   • Headache(784.0)    • Heart burn    • History of falling    • Indigestion    • Migraine    • 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HCC)    • 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HCC)    • Nausea    • Obesity    • Painful joint    • PCOS (polycystic ovarian syndrome)    • Psychosis (HCC)    • Ringing in ears    • Scoliosis    • Shortness of breath    • Sleep apnea     CPAP \"pulmonary doctor took me off mid year 2016\"   • Snoring    • Tonsillitis    • 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HCC)    • Tuberculosis     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   • Urinary bladder disorder     Suprapubic cath   • Urinary incontinence    • Weakness    • Wears glasses       Past Surgical History:   Procedure Laterality Date   • MUSCLE BIOPSY Right 1/26/2017    " Procedure: MUSCLE BIOPSY - THIGH;  Surgeon: Isidro Vigil M.D.;  Location: SURGERY [facility];  Service:    • GASTROSCOPY WITH BALLOON DILATATION N/A 1/4/2017    Procedure: GASTROSCOPY WITH DILATATION;  Surgeon: Torres Vargas M.D.;  Location: [facility];  Service:    • BOWEL STIMULATOR INSERTION  7/13/2016    Procedure: BOWEL STIMULATOR INSERTION FOR PERMANENT INTERSTIM SACRAL IMPLANT;  Surgeon: Joe Noyola M.D.;  Location: SURGERY [facility];  Service:    • RECOVERY  1/27/2016    Procedure: CATH LAB EP STUDY WITH SINUS NODE MODIFICATION LA;  Surgeon: [facility];  Location: SURGERY PRE-POST PROC UNIT [facility];  Service:    • OTHER CARDIAC SURGERY  1/2016    cardiac ablation   • NEURO DEST FACET L/S W/IG SNGL  4/21/2015    Performed by Reza Tabor at [facility]   • LUMBAR FUSION ANTERIOR  8/21/2012    Performed by SUSIE SAWANT at SURGERY [facility]   • ALYSSA BY LAPAROSCOPY  8/29/2010    Performed by SHAYY JOHNS at [facility]   • LAMINOTOMY     • OTHER ABDOMINAL SURGERY     • TONSILLECTOMY      tonsillectomy        Current Outpatient Medications:   •  pregabalin (LYRICA) 300 MG capsule, Take 1 Cap by mouth 2 times a day for 60 days., Disp: 60 Cap, Rfl: 1  •  baclofen (LIORESAL) 10 MG Tab, Take 1 Tab by mouth every bedtime., Disp: 30 Tab, Rfl: 1  •  Dulaglutide (TRULICITY) 1.5 MG/0.5ML Solution Pen-injector, Inject 0.5 mL under the skin every 7 days., Disp: 6 mL, Rfl: 3  •  esomeprazole magnesium (NEXIUM) 40 MG Pack, Take 40 mg by mouth every morning before breakfast., Disp: , Rfl:   •  busPIRone (BUSPAR) 10 MG Tab tablet, Take 10 mg by mouth 3 times a day., Disp: , Rfl:   •  levothyroxine (SYNTHROID) 100 MCG Tab, Take 100 mcg by mouth Every morning on an empty stomach., Disp: , Rfl:   •  Melatonin 10 MG Tab, Take 10 mg by mouth every bedtime., Disp: , Rfl:   •  potassium chloride (KLOR-CON) 20 MEQ Pack, Take 2 Packets by mouth 2 times a  day., Disp: 120 Packet, Rfl: 3  •  fluoxetine (PROZAC) 40 MG capsule, Take 1 Cap by mouth every day., Disp: 90 Cap, Rfl: 0  •  OXcarbazepine (TRILEPTAL) 300 MG Tab, Take 1 Tab by mouth 2 times a day., Disp: 180 Tab, Rfl: 0  •  ziprasidone (GEODON) 40 MG Cap, Take 1 tablet by mouth twice a day (Patient taking differently: Take 40 mg by mouth 2 Times a Day. Take 1 tablet by mouth twice a day), Disp: 180 Cap, Rfl: 0  •  albuterol 108 (90 Base) MCG/ACT Aero Soln inhalation aerosol, Inhale 2 Puffs every 6 hours as needed for Shortness of Breath., Disp: 8.5 g, Rfl: 6  •  Mirabegron ER (MYRBETRIQ) 50 MG TABLET SR 24 HR, Take 50 mg by mouth every day., Disp: 90 Tab, Rfl: 3  •  acetaminophen (TYLENOL) 500 MG Tab, Take 1,000 mg by mouth every 6 hours as needed for Moderate Pain., Disp: , Rfl:   •  sodium bicarbonate 325 MG Tab, Take 650 mg by mouth 2 times a day., Disp: , Rfl:   •  topiramate (TOPAMAX) 50 MG tablet, Take 50 mg by mouth 2 times a day., Disp: , Rfl:   •  ondansetron (ZOFRAN ODT) 4 MG TABLET DISPERSIBLE, Take 1 Tab by mouth every 6 hours as needed for Nausea., Disp: 10 Tab, Rfl: 0  •  ipratropium-albuterol (DUONEB) 0.5-2.5 (3) MG/3ML nebulizer solution, Take 3 mL by nebulization every four hours as needed for Shortness of Breath. Nebulizer , Disp: , Rfl:   •  mycophenolate (CELLCEPT) 500 MG tablet, Take 2 Tabs by mouth 2 times a day., Disp: 60 Tab, Rfl: 2  •  calcium carbonate (TUMS EX) 750 MG chewable tablet, Take 2 Tabs by mouth every day. (Patient taking differently: Chew 1,500 mg 1 time a day as needed.), Disp: 60 Tab, Rfl: 0  •  vitamin D, Ergocalciferol, (DRISDOL) 1.25 MG (12678 UT) Cap capsule, Take 50,000 Units by mouth every 7 days., Disp: , Rfl:   •  traZODone (DESYREL) 100 MG Tab, Take 1 Tab by mouth every bedtime. (Patient taking differently: Take 100 mg by mouth every evening.), Disp: 30 Tab, Rfl: 3  •  oxybutynin (DITROPAN) 5 MG Tab, Take 1 Tab by mouth 3 times a day., Disp: 90 Tab, Rfl: 0  •   "acetaZOLAMIDE SR (DIAMOX) 500 MG CAPSULE SR 12 HR, Take 500 mg by mouth 2 times a day., Disp: , Rfl:   •  ivabradine (CORLANOR) 7.5 MG Tab tablet, Take 7.5 mg by mouth 2 times a day, with meals., Disp: , Rfl:   •  aspirin EC (ECOTRIN) 81 MG Tablet Delayed Response, Take 81 mg by mouth every day., Disp: , Rfl:   •  nitrofurantoin (MACROBID) 100 MG Cap, , Disp: , Rfl:   •  TRULICITY 3 MG/0.5ML Solution Pen-injector, , Disp: , Rfl:   Allergies   Allergen Reactions   • Depakote [Divalproex Sodium] Unspecified     Muscle spasms/muscle pain and weakness     • Amitriptyline Unspecified     Headaches     • Aripiprazole [Abilify] Unspecified     Headaches/muscle twitching     • Clindamycin Nausea     Even with food     • Flagyl [Metronidazole Hcl] Unspecified     \"eye problems\"     • Flomax [Tamsulosin Hydrochloride] Swelling   • Levaquin Unspecified     Severe muscle cramps in legs  RXN=unknown   • Metformin Unspecified     Increased lactic acid      • Tape Rash     Tears skin off  coban with Tegaderm tape ok intermittently  RXN=ongoing   • Vancomycin Itching     Pt becomes flushed in face and chest.   RXN=7/10/16   • Wound Dressing Adhesive Hives     By pt report   • Ampicillin Rash     Pt reports that she received a rash    • Ciprofloxacin    • Keflex Rash     Pt states she gets a rash with this medication  Tolerates ceftriaxone   • Levofloxacin Unspecified     Leg muscle cramps   • Metronidazole Rash     .   • Penicillins Hives   • Sulfa Drugs Myalgia     Muscle pain and weakness   • Tamsulosin Swelling   • Valproic Acid Rash     .        Past Social History:  Social History     Socioeconomic History   • Marital status: Single     Spouse name: Not on file   • Number of children: Not on file   • Years of education: Not on file   • Highest education level: Not on file   Occupational History   • Not on file   Social Needs   • Financial resource strain: Not hard at all   • Food insecurity     Worry: Never true     Inability: " Never true   • Transportation needs     Medical: No     Non-medical: No   Tobacco Use   • Smoking status: Never Smoker   • Smokeless tobacco: Never Used   Substance and Sexual Activity   • Alcohol use: No     Alcohol/week: 0.0 oz     Frequency: Never     Binge frequency: Never   • Drug use: Not Currently     Frequency: 7.0 times per week     Types: Marijuana   • Sexual activity: Not Currently     Birth control/protection: Pill   Lifestyle   • Physical activity     Days per week: Not on file     Minutes per session: Not on file   • Stress: Not on file   Relationships   • Social connections     Talks on phone: Not on file     Gets together: Not on file     Attends [employer] service: Not on file     Active member of club or organization: Not on file     Attends meetings of clubs or organizations: Not on file     Relationship status: Not on file   • Intimate partner violence     Fear of current or ex partner: Not on file     Emotionally abused: Not on file     Physically abused: Not on file     Forced sexual activity: Not on file   Other Topics Concern   • Not on file   Social History Narrative    ** Merged History Encounter **             Family History:  Family History   Problem Relation Age of Onset   • Hypertension Mother    • Sleep Apnea Mother    • Heart Disease Mother    • Other Mother         hypothryod   • Hypertension Maternal Uncle    • Heart Disease Maternal Grandmother    • Hypertension Maternal Grandmother    • No Known Problems Sister    • Other Sister         Narcolepsy;fibromyalsia;bone;nerve   • Genitourinary () Problems Sister         endometriosis       Depression and Opioid Screening  PHQ-9:  Depression Screen (PHQ-2/PHQ-9) 10/30/2020 1/7/2021 2/5/2021   PHQ-2 Total Score 0 - -   PHQ-2 Total Score - - -   PHQ-2 Total Score - 2 0   PHQ-9 Total Score - - -   PHQ-9 Total Score - - -   PHQ-9 Total Score - 9 -   Some encounter information is confidential and restricted. Go to Review Flowsheets activity  to see all data.     Interpretation of PHQ-9 Total Score   Score Severity   1-4 No Depression   5-9 Mild Depression   10-14 Moderate Depression   15-19 Moderately Severe Depression   20-27 Severe Depression     OBJECTIVE:   Vital Signs:  Vitals:    02/05/21 0807   BP: 102/70   Pulse: 93   Temp: 37.7 °C (99.9 °F)   SpO2: 98%        Pertinent Labs:  Lab Results   Component Value Date/Time    SODIUM 139 02/03/2021 05:50 PM    POTASSIUM 3.9 02/03/2021 05:50 PM    CHLORIDE 111 02/03/2021 05:50 PM    CO2 16 (L) 02/03/2021 05:50 PM    GLUCOSE 99 02/03/2021 05:50 PM    BUN 7 (L) 02/03/2021 05:50 PM    CREATININE 0.63 02/03/2021 05:50 PM    CREATININE 0.75 (L) 07/20/2010 11:00 AM    BUNCREATRAT 19 07/20/2010 11:00 AM    GLOMRATE >59 07/20/2010 11:00 AM       Lab Results   Component Value Date/Time    HBA1C 4.6 01/20/2021 05:33 AM       Lab Results   Component Value Date/Time    WBC 4.0 (L) 02/03/2021 05:50 PM    WBC 6.1 07/20/2010 11:00 AM    RBC 4.43 02/03/2021 05:50 PM    RBC 4.38 07/20/2010 11:00 AM    HEMOGLOBIN 13.0 02/03/2021 05:50 PM    HEMATOCRIT 40.2 02/03/2021 05:50 PM    MCV 90.7 02/03/2021 05:50 PM    MCV 93 07/20/2010 11:00 AM    MCH 29.3 02/03/2021 05:50 PM    MCH 30.1 07/20/2010 11:00 AM    MCHC 32.3 (L) 02/03/2021 05:50 PM    MPV 11.8 02/03/2021 05:50 PM    NEUTSPOLYS 47.10 02/03/2021 05:50 PM    LYMPHOCYTES 37.60 02/03/2021 05:50 PM    MONOCYTES 10.10 02/03/2021 05:50 PM    EOSINOPHILS 4.00 02/03/2021 05:50 PM    BASOPHILS 1.00 02/03/2021 05:50 PM    ANISOCYTOSIS 1+ 07/08/2019 04:04 PM       Lab Results   Component Value Date/Time    ASTSGOT 18 02/03/2021 05:50 PM    ALTSGPT 18 02/03/2021 05:50 PM        Physical Exam:   GEN: No apparent distress, appears stated age.  Presents in manual wheelchair, noncustom.  HEENT: Head normocephalic, atraumatic.  Sclera nonicteric bilaterally, no ocular discharge appreciated bilaterally.  Mask donned.  CV: Extremities warm and well-perfused, no significant peripheral  edema appreciated bilaterally.  PULMONARY: Breathing nonlabored on room air, no respiratory accessory muscle use.  Not requiring supplemental oxygen.  ABD: Soft, nontender.  SKIN: No appreciable skin breakdown on exposed areas of skin.  PSYCH: Mood and affect within normal limits.  NEURO: Awake alert.  Conversational.  Logical thought content.  No dysarthria, no aphasia    Motor Exam Upper Extremities   ? Myotome R L   Shoulder Abduction C5 5 5   Elbow flexion C5 5 5   Wrist extension C6 5 5   Elbow extension C7 5 5   Finger flexion C8 5 5   Finger abduction T1 5 5     Questionable effort of strength in left lower extremities.  However is grossly antigravity in hip flexion bilaterally, knee extension bilaterally, dorsiflexion bilaterally.  No orthotics appreciated.  No significant tone appreciated on MAS in the lower extremities with exception of bilateral plantar flexors which is 1/4 bilaterally.  Does have reproducible clonus bilaterally.    Imaging:   CT abdomen pelvis 2/3/2021  1.  No acute abnormality within the abdomen or pelvis  2.  Suprapubic catheter present and appears appropriately located  3.  3.5 cm right ovarian cyst  4.  Pelvic device wire present  5.  Hepatic steatosis and hepatomegaly  6.  Prior cholecystectomy. No biliary dilation.    CT thoracic spine 1/20/2021  Mild facet arthropathy and ligamentum flavum redundancy results in slight dorsolateral and dorsal thecal sac indentation but no brandon stenosis is identified  Several mild anterior wedge compression deformities appear chronic  Multilevel Schmorl's node formation. No disc protrusion/extrusion is identified    CT lumbar spine 1/20/2021  Lumbosacral junction facet arthropathy with left pars interarticular defect, right interarticularis sclerosis suggesting stress response. This does not result in foraminal or central stenosis  L4/5 anterior fusion is mature  Epidural lipomatosis caudally    CT cervical spine 1/20/2021  Normal  alignment  Cervical cord is normal in contour without atrophy or enlargement noted.  No central or foraminal stenosis is seen throughout the study. There is excellent contrast opacification of the thecal space. No ventral or other epidural defect is seen  No disc height loss. There is mild C5-6 facet spurring on the left, posteriorly. This does not encroach upon the central canal or foramina. No uncovertebral spurring. No erosive change. No ankylosis. No erosive abnormality  No prevertebral or other soft tissue swelling.  Lung apices are clear  Normal configuration of the thyroid gland without enlargement  No lymphadenopathy    ASSESSMENT/PLAN: Kristin Balderrama  is a 30 y.o. female with rehabilitation history significant for chronic myelopathic symptoms including bilateral lower extremity weakness, neuropathic pain, spasticity, neurogenic bladder, neurogenic bowel, here for evaluation. The following plan was discussed with the patient who is in agreement.     Visit Diagnoses     ICD-10-CM   1. Impaired mobility and activities of daily living  Z74.09    Z78.9   2. Polyneuropathy associated with underlying disease ([facility])  G63   3. 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[facility])  G37.3   4. Spasticity  R25.2   5. Neuropathic pain  M79.2        Rehab/Neuro:   1. History of transverse myelitis vs functional neurologic dysfunction: Followed by neurology  2. History of mechanical low back pain refractory to nonsurgical management s/p L4-5 corpectomy and fusion 8/27/2012 Dr. Stringer  3. History of inflammatory optic neuropathy (CRION syndrome)  -Records reviewed as aforementioned in the HPI.  -Therapy: Currently not undergoing therapy    Assessment of Current Functional Status: 2/5/2021-patient recently discharged from the hospital after fall.  After very long discussion with patient today it appears she is at her functional baseline and additionally her neurogenic bladder/neurogenic bowel are around her functional baseline.  The couple things  that are uncontrolled include her spasms at night as well as neuropathic pain.  Patient has very questionable spasticity via modified Levar scale on exam, but clearly does have clonus bilateral ankles.    Spasticity:  -Med management: Prescribed 10 mg baclofen nightly  -Counseling/education: Counseled on potential side effects.    Neuropathic Pain:  -Med management: Restart Lyrica 300 mg twice daily.  -Extensive chart review performed, unclear why this medication was stopped.  -Counseling/education: Counseled on potential side effects.    Neurogenic Bladder:  1.  Chronic urinary retention - s/p SPC tube  2.  Recurrent UTIs -Enterococcus faecalis 1/26/2021, Klebsiella pneumonia 10/9/2020, Klebsiella oxytoca ESBL 9/21/2021  -Counseling/education: Counseled on necessity of establishing with urology for every 4 week SPC changes and to avoid recurrent UTIs.  -Med management: On oxybutynin 5 mg 3 times daily and Myrbetriq 50 mg daily.    Neurogenic Bowel: Chronic fecal incontinence without rectal tone s/p cholecystectomy.  S/p InterStim system implantation 7/13/2016 Dr. Joe Noyola.  -Consultants: Gastroenterology; upcoming endoscopy/colonoscopy  -Consultants: General surgery, counseled patient to ensure she reschedules her appointment with Dr. Noyola given that she reports her stimulator is malfunctioning.    Endo: ?  History of Hashimoto's thyroiditis.     Mood:  -Consultants: Established with psychiatry    Follow up: 2 months to reevaluate neuropathic pain    Total time spent was 65 minutes.  Included in this time is the time spent preparing for the visit including record review, my exam and evaluation, counseling and education regarding that which is aforementioned in the assessment and plan, and ordering the appropriate medications. Extensive discussion involved the patient and friend.    Please note that this dictation was created using voice recognition software. I have made every reasonable attempt to correct  obvious errors but there may be errors of grammar and content that I may have overlooked prior to finalization of this note.    Dr. Kayla Wise DO, MS  Department of Physical Medicine & Rehabilitation  Neuro Rehabilitation Clinic  [facility]

## 2021-02-05 NOTE — TELEPHONE ENCOUNTER
Pt has questions aout her suprapubic cath. Was just in the ER, showed them that it was torn . The home health RN stated that it looks angry, hurts a lot, and gunk coming out of it, it is just hurting. Pt states the yesterday her urostomy was barely draining.  Pt states both her and home health RN state this is angry red and they have both called urology and have not received a call back.  Pt states she is bedbound and does not have a way to go anywhere.  Pt was told about [facility] and asked if she wanted them to come out. Pt gave consent.. [facility] initiated- information given, they called patient while on the phone, pt got off phone to utilize [facility], who said they'd be able to see her today.    Reason for Disposition  • [1] MILD-MODERATE pain AND [2] constant AND [3] present > 2 hours    Additional Information  • Negative: Shock suspected (e.g., cold/pale/clammy skin, too weak to stand, low BP, rapid pulse)  • Negative: Sounds like a life-threatening emergency to the triager  • Negative: [1] Post-op AND [2] and general post-operative symptoms or questions, unrelated to ostomy  • Negative: [1] SEVERE pain (e.g., excruciating) AND [2] present > 1 hour  • Negative: Bloody, tarry, or jet black-colored stool  • Negative: [1] Bleeding from stoma tissue (stoma is moist, pink to red-colored tissue) AND [2] won't stop after 10 minutes of direct pressure  • Negative: Stoma separates from the surrounding skin at the suture line (e.g. gaping wound next to stoma)  • Negative: Stoma turns purple or black  • Negative: [1] No stool or gas from ILEOSTOMY > 6 hours AND [2] abdominal pain or vomiting  • Negative: [1] No stool or gas from ILEOSTOMY > 6 hours AND [2] no improvement after using CARE ADVICE  • Negative: [1] No stool or gas from COLOSTOMY > 48 hours (2 days) AND [2] abdominal pain or vomiting  • Negative: [1] Drinking very little AND [2] dehydration suspected (e.g., no urine > 12 hours, very dry  "mouth, very lightheaded)  • Negative: Patient sounds very sick or weak to the triager    Answer Assessment - Initial Assessment Questions  1.  TYPE: \"What type of ostomy do you have?\" (e.g., ileostomy, colostomy; temporary, long-term [permanent]). Suprapubic catheter  2.  LOCATION: \"Where is the ostomy located?\" lower abdomen  3.  DURATION: \"How long have you had the ostomy?\" (e.g., days, weeks, months, years).  4.  MAIN CONCERN: \"What is your main concern or symptom today?\" (e.g., skin redness or irritation, bleeding, leaking, change in output or appearance of ostomy).      Angry red, draining gook, painful, not draining much urine  5. DAILY OSTOMY OUTPUT DIARY: \"Do you keep a daily diary of the output from your ostomy\"? Not much the past 2 days  6.  OSTOMY OUTPUT: \"How much stool output over a 24 hour period are you having?\" (e.g., - ml; consistency of water, milkshake, pudding)  7.  OTHER SYMPTOMS: \"Are you having any other symptoms?\" (e.g., fever, vomiting, blood in stool, abdominal pain, dizziness)     pain    Protocols used: OSTOMY SYMPTOMS AND [id number]      "

## 2021-02-06 VITALS
HEIGHT: 65 IN | HEART RATE: 95 BPM | OXYGEN SATURATION: 97 % | TEMPERATURE: 98.2 F | WEIGHT: 234.57 LBS | RESPIRATION RATE: 19 BRPM | SYSTOLIC BLOOD PRESSURE: 110 MMHG | BODY MASS INDEX: 39.08 KG/M2 | DIASTOLIC BLOOD PRESSURE: 63 MMHG

## 2021-02-06 PROBLEM — E87.6 HYPOKALEMIA: Status: RESOLVED | Noted: 2019-09-29 | Resolved: 2021-02-06

## 2021-02-06 LAB
ANION GAP SERPL CALC-SCNC: 10 MMOL/L (ref 7–16)
BUN SERPL-MCNC: 8 MG/DL (ref 8–22)
CALCIUM SERPL-MCNC: 8.2 MG/DL (ref 8.5–10.5)
CHLORIDE SERPL-SCNC: 114 MMOL/L (ref 96–112)
CO2 SERPL-SCNC: 13 MMOL/L (ref 20–33)
CREAT SERPL-MCNC: 0.48 MG/DL (ref 0.5–1.4)
GLUCOSE SERPL-MCNC: 130 MG/DL (ref 65–99)
MAGNESIUM SERPL-MCNC: 1.5 MG/DL (ref 1.5–2.5)
POTASSIUM SERPL-SCNC: 4 MMOL/L (ref 3.6–5.5)
SODIUM SERPL-SCNC: 137 MMOL/L (ref 135–145)

## 2021-02-06 PROCEDURE — 51798 US URINE CAPACITY MEASURE: CPT

## 2021-02-06 PROCEDURE — 700111 HCHG RX REV CODE 636 W/ 250 OVERRIDE (IP): Performed by: STUDENT IN AN ORGANIZED HEALTH CARE EDUCATION/TRAINING PROGRAM

## 2021-02-06 PROCEDURE — 700105 HCHG RX REV CODE 258: Performed by: STUDENT IN AN ORGANIZED HEALTH CARE EDUCATION/TRAINING PROGRAM

## 2021-02-06 PROCEDURE — 700102 HCHG RX REV CODE 250 W/ 637 OVERRIDE(OP): Performed by: NURSE PRACTITIONER

## 2021-02-06 PROCEDURE — 99217 PR OBSERVATION CARE DISCHARGE: CPT | Performed by: FAMILY MEDICINE

## 2021-02-06 PROCEDURE — G0378 HOSPITAL OBSERVATION PER HR: HCPCS

## 2021-02-06 PROCEDURE — 96366 THER/PROPH/DIAG IV INF ADDON: CPT

## 2021-02-06 PROCEDURE — 80048 BASIC METABOLIC PNL TOTAL CA: CPT

## 2021-02-06 PROCEDURE — 83735 ASSAY OF MAGNESIUM: CPT

## 2021-02-06 PROCEDURE — 96372 THER/PROPH/DIAG INJ SC/IM: CPT

## 2021-02-06 PROCEDURE — 96376 TX/PRO/DX INJ SAME DRUG ADON: CPT

## 2021-02-06 PROCEDURE — A9270 NON-COVERED ITEM OR SERVICE: HCPCS | Performed by: NURSE PRACTITIONER

## 2021-02-06 PROCEDURE — 700102 HCHG RX REV CODE 250 W/ 637 OVERRIDE(OP): Performed by: STUDENT IN AN ORGANIZED HEALTH CARE EDUCATION/TRAINING PROGRAM

## 2021-02-06 PROCEDURE — A9270 NON-COVERED ITEM OR SERVICE: HCPCS | Performed by: STUDENT IN AN ORGANIZED HEALTH CARE EDUCATION/TRAINING PROGRAM

## 2021-02-06 RX ORDER — BACLOFEN 10 MG/1
10 TABLET ORAL ONCE
Status: COMPLETED | OUTPATIENT
Start: 2021-02-06 | End: 2021-02-06

## 2021-02-06 RX ORDER — PREGABALIN 100 MG/1
300 CAPSULE ORAL ONCE
Status: COMPLETED | OUTPATIENT
Start: 2021-02-06 | End: 2021-02-06

## 2021-02-06 RX ADMIN — BUSPIRONE HYDROCHLORIDE 10 MG: 10 TABLET ORAL at 05:11

## 2021-02-06 RX ADMIN — POTASSIUM CHLORIDE 40 MEQ: 1500 TABLET, EXTENDED RELEASE ORAL at 11:59

## 2021-02-06 RX ADMIN — PREGABALIN 300 MG: 100 CAPSULE ORAL at 10:09

## 2021-02-06 RX ADMIN — TOPIRAMATE 50 MG: 25 TABLET, FILM COATED ORAL at 10:10

## 2021-02-06 RX ADMIN — ACETAZOLAMIDE 500 MG: 500 CAPSULE, EXTENDED RELEASE ORAL at 10:10

## 2021-02-06 RX ADMIN — OXCARBAZEPINE 300 MG: 300 TABLET, FILM COATED ORAL at 10:08

## 2021-02-06 RX ADMIN — OMEPRAZOLE 40 MG: KIT at 07:00

## 2021-02-06 RX ADMIN — MORPHINE SULFATE 2 MG: 4 INJECTION INTRAVENOUS at 08:48

## 2021-02-06 RX ADMIN — ENOXAPARIN SODIUM 40 MG: 40 INJECTION SUBCUTANEOUS at 05:11

## 2021-02-06 RX ADMIN — FLUOXETINE 40 MG: 20 CAPSULE ORAL at 05:11

## 2021-02-06 RX ADMIN — ASPIRIN 81 MG: 81 TABLET, COATED ORAL at 05:11

## 2021-02-06 RX ADMIN — ZIPRASIDONE HYDROCHLORIDE 40 MG: 40 CAPSULE ORAL at 10:10

## 2021-02-06 RX ADMIN — MYCOPHENOLATE MOFETIL 1000 MG: 250 CAPSULE ORAL at 08:27

## 2021-02-06 RX ADMIN — VANCOMYCIN HYDROCHLORIDE 2000 MG: 500 INJECTION, POWDER, LYOPHILIZED, FOR SOLUTION INTRAVENOUS at 05:16

## 2021-02-06 RX ADMIN — LEVOTHYROXINE SODIUM 100 MCG: 0.1 TABLET ORAL at 05:11

## 2021-02-06 RX ADMIN — IVABRADINE 7.5 MG: 7.5 TABLET, FILM COATED ORAL at 08:26

## 2021-02-06 RX ADMIN — MORPHINE SULFATE 2 MG: 4 INJECTION INTRAVENOUS at 04:16

## 2021-02-06 RX ADMIN — OXYBUTYNIN CHLORIDE 5 MG: 5 TABLET ORAL at 11:59

## 2021-02-06 RX ADMIN — ACETAMINOPHEN 650 MG: 325 TABLET ORAL at 01:51

## 2021-02-06 RX ADMIN — BACLOFEN 10 MG: 10 TABLET ORAL at 10:09

## 2021-02-06 RX ADMIN — OXYBUTYNIN CHLORIDE 5 MG: 5 TABLET ORAL at 06:00

## 2021-02-06 RX ADMIN — SODIUM BICARBONATE 650 MG: 650 TABLET ORAL at 08:27

## 2021-02-06 ASSESSMENT — PAIN DESCRIPTION - PAIN TYPE
TYPE: ACUTE PAIN
TYPE: ACUTE PAIN

## 2021-02-06 ASSESSMENT — PATIENT HEALTH QUESTIONNAIRE - PHQ9
7. TROUBLE CONCENTRATING ON THINGS, SUCH AS READING THE NEWSPAPER OR WATCHING TELEVISION: NOT AT ALL
9. THOUGHTS THAT YOU WOULD BE BETTER OFF DEAD, OR OF HURTING YOURSELF: NOT AT ALL
2. FEELING DOWN, DEPRESSED, IRRITABLE, OR HOPELESS: SEVERAL DAYS
SUM OF ALL RESPONSES TO PHQ QUESTIONS 1-9: 2
5. POOR APPETITE OR OVEREATING: NOT AT ALL
3. TROUBLE FALLING OR STAYING ASLEEP OR SLEEPING TOO MUCH: NOT AT ALL
SUM OF ALL RESPONSES TO PHQ9 QUESTIONS 1 AND 2: 1
6. FEELING BAD ABOUT YOURSELF - OR THAT YOU ARE A FAILURE OR HAVE LET YOURSELF OR YOUR FAMILY DOWN: NOT AL ALL
8. MOVING OR SPEAKING SO SLOWLY THAT OTHER PEOPLE COULD HAVE NOTICED. OR THE OPPOSITE, BEING SO FIGETY OR RESTLESS THAT YOU HAVE BEEN MOVING AROUND A LOT MORE THAN USUAL: NOT AT ALL
1. LITTLE INTEREST OR PLEASURE IN DOING THINGS: NOT AT ALL
4. FEELING TIRED OR HAVING LITTLE ENERGY: SEVERAL DAYS

## 2021-02-06 NOTE — RESPIRATORY CARE
" COPD EDUCATION by COPD CLINICAL EDUCATOR  2/6/2021 at 7:06 AM by Sue Jacobs, RRT     Patient reviewed by COPD education team. Patient does not have a history or diagnosis of COPD, has never smoked. PFT results 9/2019 = No hx or dx of COPD, never smoked PFT results - \"Pulmonary function tests are consistent with mild restrictive process and may be secondary to obesity\".  Therefore does not qualify for the COPD program.  "

## 2021-02-06 NOTE — PROGRESS NOTES
Dr. Mendes aware of pt increased Lactic acid level, limited IV access and potassium level and infusion status.  New orders to follow.

## 2021-02-06 NOTE — DISCHARGE PLANNING
Bedside assessment completed with information obtained from patient. Pt lives in a one story home with her grandmother and her Aunt. Pt states she is primarily bed bound 2nd to her transverse Myelitis and has many medical devices at home to assist her. Pt uses [facility] weekly and then her grandmother does everything else that pt needs. According to pt the facesheet info is correct. Pt utilizes [employer] for transportation needs, but a family member will be picking her up upon DC.       Care Transition Team Assessment    Information Source  Orientation : Oriented x 4  Information Given By: Patient  Who is responsible for making decisions for patient? : Patient    Readmission Evaluation  Is this a readmission?: Yes - unplanned readmission  Why do you think you were readmitted?: catherter issue         Interdisciplinary Discharge Planning  Does Admitting Nurse Feel This Could be a Complex Discharge?: No  Primary Care Physician: Sue Preston MD  Lives with - Patient's Self Care Capacity: (Grandmother and Aunt)  Support Systems: Family Member(s)  Housing / Facility: 1 Story House  Do You Take your Prescribed Medications Regularly: Yes([facility])  Able to Return to Previous ADL's: Yes  Mobility Issues: Yes  Prior Services: Skilled Home Health Services  Patient Prefers to be Discharged to:: Home  Assistance Needed: No(pt already has [facility] service)  Durable Medical Equipment: Walker, Commode(slide board, hospital bed, WC, shower bench)  DME Provider / Phone: [facility]([phone])    Discharge Preparedness  What are your discharge supports?: Grandparent(Aunt)  Prior Functional Level: Needs Assist with Activities of Daily Living, Needs Assist with Medication Management, Uses Walker, Uses Wheelchair(pt states she almost bed bound)  Difficulity with ADLs: Bathing, Dressing, Toileting, Walking  Difficulty with ADLs Comment: (grandmother helps pt will all her needs)  Difficulity with IADLs: Cooking, Driving,  Laundry, Managing medication, Shopping(grandmother helps pt with all her needs)    Functional Assesment  Prior Functional Level: Needs Assist with Activities of Daily Living, Needs Assist with Medication Management, Uses Walker, Uses Wheelchair(pt states she almost bed bound)    Finances  Financial Barriers to Discharge: Yes  Average Monthly Income: (pt unaware her monthly income)  Source of Income: Social Security Disability  Prescription Coverage: Yes    Vision / Hearing Impairment  Vision Impairment : (wears glasses)  Hearing Impairment : No    Values / Beliefs / Concerns  Values / Beliefs Concerns : No    Advance Directive  Advance Directive?: POLST    Domestic Abuse  Have you ever been the victim of abuse or violence?: No  Physical Abuse or Sexual Abuse: No    Psychological Assessment  History of Substance Abuse: None  History of Psychiatric Problems: (pt states she has depression and anxiety)    Discharge Risks or Barriers  Discharge risks or barriers?: Complex medical needs(depends on grandmother for most of her ADL's)  Patient risk factors: Cognitive / sensory / physical deficit, Complex medical needs, Readmission    Anticipated Discharge Information  Discharge Disposition: Still a Patient (29)  Discharge Address: Sue Preston MD  Discharge Contact Phone Number: (pt states one of her family members will be available )

## 2021-02-06 NOTE — ED PROVIDER NOTES
"ED Provider Note    CHIEF COMPLAINT  Chief Complaint   Patient presents with   • Urinary Catheter Problem       HPI  Kristin Balderrama is a 30 y.o. female who presents to the emergency department with complaint of catheter abnormality.  The patient does have a suprapubic catheter as she is a paraplegic.  She endorses significant discharge yellow pus coming from the wound, surrounding erythema at the actual site suprapubic stoma.  She went to her primary care physician's office earlier today and found that the side was infected instructed, the hospital.  The patient recently was on Macrobid secondary to urinary tract infection.  She states that she still feels weak, tired and believes she still has an infection.  REVIEW OF SYSTEMS  Positives as above. Pertinent negatives include fever, nausea, vomiting, cough, Covid exposure  All other 10 review of systems are negative    PAST MEDICAL HISTORY  Past Medical History:   Diagnosis Date   • Abdominal pain    • Anginal syndrome     random chest pain especially with tachycardia   • Apnea, sleep    • Arrhythmia     \"sinus tachycardia\", cariologist, Dr. Kumar; ablation 2/2016   • Arthritis     osteo   • ASTHMA     when around smoke   • Atrial fibrillation (HCC)    • Back pain    • Borderline personality disorder (HCC)    • Breath shortness     with tachycardia   • Bronchitis    • Cardiac arrhythmia    • Chickenpox    • Chronic obstructive pulmonary disease (HCC)    • Chronic UTI 9/18/2010   • Cough    • Daytime sleepiness    • Depression    • Diabetes (HCC)    • Diarrhea    • Disorder of thyroid    • Fall    • Fatigue    • Frequent headaches    • Gasping for breath    • Gynecological disorder     PCOS   • Headache(784.0)    • Heart burn    • History of falling    • Indigestion    • Migraine    • 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HCC)    • 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HCC)    • Nausea    • Obesity    • Other fatigue 6/29/2020   • Pain 08-15-12    back, 7/10   • Painful joint    • " "PCOS (polycystic ovarian syndrome)    • Pneumonia 2012   • Psychosis (HCC)    • Ringing in ears    • Scoliosis    • Shortness of breath    • Sinus tachycardia 10/31/2013   • Sleep apnea     CPAP \"pulmonary doctor took me off mid year 2016\"   • Snoring    • Tonsillitis    • 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HCC)    • Tuberculosis     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   • Urinary bladder disorder     Suprapubic cath   • Urinary incontinence    • Weakness    • Wears glasses        FAMILY HISTORY  Noncontributory    SOCIAL HISTORY  Social History     Socioeconomic History   • Marital status: Single     Spouse name: Not on file   • Number of children: Not on file   • Years of education: Not on file   • Highest education level: Not on file   Occupational History   • Not on file   Social Needs   • Financial resource strain: Not hard at all   • Food insecurity     Worry: Never true     Inability: Never true   • Transportation needs     Medical: No     Non-medical: No   Tobacco Use   • Smoking status: Never Smoker   • Smokeless tobacco: Never Used   Substance and Sexual Activity   • Alcohol use: No     Alcohol/week: 0.0 oz     Frequency: Never     Binge frequency: Never   • Drug use: Not Currently     Frequency: 7.0 times per week     Types: Marijuana   • Sexual activity: Not Currently     Birth control/protection: Pill   Lifestyle   • Physical activity     Days per week: Not on file     Minutes per session: Not on file   • Stress: Not on file   Relationships   • Social connections     Talks on phone: Not on file     Gets together: Not on file     Attends Spiritism service: Not on file     Active member of club or organization: Not on file     Attends meetings of clubs or organizations: Not on file     Relationship status: Not on file   • Intimate partner violence     Fear of current or ex partner: Not on file     Emotionally abused: Not on file     Physically abused: Not on file     Forced sexual activity: Not on file   Other " "Topics Concern   • Not on file   Social History Narrative    ** Merged History Encounter **            SURGICAL HISTORY  Past Surgical History:   Procedure Laterality Date   • MUSCLE BIOPSY Right 1/26/2017    Procedure: MUSCLE BIOPSY - THIGH;  Surgeon: Isidro Vigil M.D.;  Location: SURGERY [facility];  Service:    • GASTROSCOPY WITH BALLOON DILATATION N/A 1/4/2017    Procedure: GASTROSCOPY WITH DILATATION;  Surgeon: Torres Vargas M.D.;  Location: SURGERY [facility];  Service:    • BOWEL STIMULATOR INSERTION  7/13/2016    Procedure: BOWEL STIMULATOR INSERTION FOR PERMANENT INTERSTIM SACRAL IMPLANT;  Surgeon: Joe Noyola M.D.;  Location: SURGERY [facility];  Service:    • RECOVERY  1/27/2016    Procedure: CATH LAB EP STUDY WITH SINUS NODE MODIFICATION LA;  Surgeon: Recoveryonly Surgery;  [place] [facility] [place]:    • OTHER CARDIAC SURGERY  1/2016    cardiac ablation   • NEURO DEST FACET L/S W/IG SNGL  4/21/2015    Performed by Reza Taobr at [facility]   • LUMBAR FUSION ANTERIOR  8/21/2012    Performed by SUSIE SAWANT at SURGERY [facility]   • ALYSSA BY LAPAROSCOPY  8/29/2010    Performed by SHAYY JOHNS at [facility]   • LAMINOTOMY     • OTHER ABDOMINAL SURGERY     • TONSILLECTOMY      tonsillectomy       CURRENT MEDICATIONS  Home Medications    **Home medications have not yet been reviewed for this encounter**         ALLERGIES  Allergies   Allergen Reactions   • Depakote [Divalproex Sodium] Unspecified     Muscle spasms/muscle pain and weakness     • Amitriptyline Unspecified     Headaches     • Aripiprazole [Abilify] Unspecified     Headaches/muscle twitching     • Clindamycin Nausea     Even with food     • Flagyl [Metronidazole Hcl] Unspecified     \"eye problems\"     • Flomax [Tamsulosin Hydrochloride] Swelling   • Levaquin Unspecified     Severe muscle cramps in legs  RXN=unknown   • Metformin Unspecified     Increased " "lactic acid      • Tape Rash     Tears skin off  coban with Tegaderm tape ok intermittently  RXN=ongoing   • Vancomycin Itching     Pt becomes flushed in face and chest.   RXN=7/10/16   • Wound Dressing Adhesive Hives     By pt report   • Ampicillin Rash     Pt reports that she received a rash    • Ciprofloxacin    • Keflex Rash     Pt states she gets a rash with this medication  Tolerates ceftriaxone   • Levofloxacin Unspecified     Leg muscle cramps   • Metronidazole Rash     .   • Penicillins Hives   • Sulfa Drugs Myalgia     Muscle pain and weakness   • Tamsulosin Swelling   • Valproic Acid Rash     .       PHYSICAL EXAM  VITAL SIGNS: /62   Pulse 85   Temp 36.7 °C (98 °F) (Oral)   Resp 20   Ht 1.651 m (5' 5\")   Wt 102 kg (225 lb)   SpO2 98%   BMI 37.44 kg/m²      Constitutional: Well developed, Well nourished, No acute distress, Non-toxic appearance.   Eyes: PERRLA, EOMI, Conjunctiva normal, No discharge.   Cardiovascular: Normal heart rate, Normal rhythm, No murmurs, No rubs, No gallops, and intact distal pulses.   Thorax & Lungs:  No respiratory distress, no rales, no rhonchi, No wheezing, No chest wall tenderness.   Abdomen: Protuberant, bowel sounds normal, Soft, tenderness at the suprapubic site with surrounding elbow pus and erythema, no tenderness, No guarding, No rebound, No pulsatile masses.   Skin: Warm, Dry, erythema surrounding the suprapubic spine and slight traction up to the pannus, No rash.   Extremities: Full range of motion, no deformity, no edema.  Neurologic: Alert & oriented x 3, unable to move lower extremities bilaterally  Psychiatric: Affect normal for clinical presentation.    Results for orders placed or performed during the hospital encounter of 02/03/21   CBC WITH DIFFERENTIAL   Result Value Ref Range    WBC 4.0 (L) 4.8 - 10.8 K/uL    RBC 4.43 4.20 - 5.40 M/uL    Hemoglobin 13.0 12.0 - 16.0 g/dL    Hematocrit 40.2 37.0 - 47.0 %    MCV 90.7 81.4 - 97.8 fL    MCH 29.3 27.0 " - 33.0 pg    MCHC 32.3 (L) 33.6 - 35.0 g/dL    RDW 43.1 35.9 - 50.0 fL    Platelet Count 186 164 - 446 K/uL    MPV 11.8 9.0 - 12.9 fL    Neutrophils-Polys 47.10 44.00 - 72.00 %    Lymphocytes 37.60 22.00 - 41.00 %    Monocytes 10.10 0.00 - 13.40 %    Eosinophils 4.00 0.00 - 6.90 %    Basophils 1.00 0.00 - 1.80 %    Immature Granulocytes 0.20 0.00 - 0.90 %    Nucleated RBC 0.00 /100 WBC    Neutrophils (Absolute) 1.90 (L) 2.00 - 7.15 K/uL    Lymphs (Absolute) 1.52 1.00 - 4.80 K/uL    Monos (Absolute) 0.41 0.00 - 0.85 K/uL    Eos (Absolute) 0.16 0.00 - 0.51 K/uL    Baso (Absolute) 0.04 0.00 - 0.12 K/uL    Immature Granulocytes (abs) 0.01 0.00 - 0.11 K/uL    NRBC (Absolute) 0.00 K/uL   COMP METABOLIC PANEL   Result Value Ref Range    Sodium 139 135 - 145 mmol/L    Potassium 3.9 3.6 - 5.5 mmol/L    Chloride 111 96 - 112 mmol/L    Co2 16 (L) 20 - 33 mmol/L    Anion Gap 12.0 7.0 - 16.0    Glucose 99 65 - 99 mg/dL    Bun 7 (L) 8 - 22 mg/dL    Creatinine 0.63 0.50 - 1.40 mg/dL    Calcium 9.0 8.4 - 10.2 mg/dL    AST(SGOT) 18 12 - 45 U/L    ALT(SGPT) 18 2 - 50 U/L    Alkaline Phosphatase 73 30 - 99 U/L    Total Bilirubin 0.2 0.1 - 1.5 mg/dL    Albumin 4.2 3.2 - 4.9 g/dL    Total Protein 6.0 6.0 - 8.2 g/dL    Globulin 1.8 (L) 1.9 - 3.5 g/dL    A-G Ratio 2.3 g/dL   HCG QUAL SERUM   Result Value Ref Range    Beta-Hcg Qualitative Serum Negative Negative   URINALYSIS,CULTURE IF INDICATED    Specimen: Urine, Suprapubic   Result Value Ref Range    Color Yellow     Character Hazy (A)     Specific Gravity 1.025 <1.035    Ph 6.5 5.0 - 8.0    Glucose Negative Negative mg/dL    Ketones Negative Negative mg/dL    Protein Negative Negative mg/dL    Bilirubin Negative Negative    Nitrite Positive (A) Negative    Leukocyte Esterase Negative Negative    Occult Blood Trace (A) Negative    Micro Urine Req Microscopic    ESTIMATED GFR   Result Value Ref Range    GFR If African American >60 >60 mL/min/1.73 m 2    GFR If Non African American >60  >60 mL/min/1.73 m 2   URINE MICROSCOPIC (W/UA)   Result Value Ref Range    WBC 2-5 /hpf    RBC 2-5 (A) /hpf    Bacteria Few (A) None /hpf    Epithelial Cells Few Few /hpf    Mucous Threads Moderate /hpf    Ca Oxalate Crystal Moderate /hpf     *Note: Due to a large number of results and/or encounters for the requested time period, some results have not been displayed. A complete set of results can be found in Results Review.     Procedure: Suprapubic catheter placement  Sterile technique was respected.  The patient had her previous suprapubic catheter removed, there is pus at the stoma site it was cleaned with Betadine.  Following this, sterile technique was inspected and I placed a 16 [place] Andrade catheter within the stoma without difficulty, 10 mL of fluid was infused in the balloon, the patient had slight urine output from this.  The patient tolerated procedure well    COURSE & MEDICAL DECISION MAKING  Pertinent Labs & Imaging studies reviewed. (See chart for details)  This is a 30-year-old female who is paraplegic with infected stoma for a suprapubic catheter and cellulitis.  I discussed the patient with our pharmacy team and we believe the patient will benefit vancomycin IV.  IV is established, she received morphine 4 mg IV and Zofran 4 mg and Tylenol 975 mg.  The patient does not have evidence of significant urinary tract infection, she does have evidence of cellulitis discharge from the stoma.  Cultures have been obtained in form of blood culture, urine culture as well as wound culture.  I discussed the patient with the hospitalist who graciously except the patient for hospitalization.      FINAL IMPRESSION  Infected stoma for suprapubic catheter  Cellulitis         Electronically signed by: Jim Sandhu D.O., 2/5/2021 5:05 PM

## 2021-02-06 NOTE — PROGRESS NOTES
Patient admitted from to Room 204 from ED with c/o suprapubic cath vs UTI infection.    Pt oriented to room, call bell and Plan of care including plan of care, medication and pain managment.  Patient verbalized understanding of all.    See physical assessment.  Physician orders verified and plan of care in place.

## 2021-02-06 NOTE — DISCHARGE SUMMARY
Discharge Summary    CHIEF COMPLAINT ON ADMISSION  Chief Complaint   Patient presents with   • Urinary Catheter Problem       Reason for Admission  EMS     Admission Date  2/5/2021    CODE STATUS  Full Code    HPI & HOSPITAL COURSE  Ms. Balderrama is a 30-year-old female with PMH significant for DM 2, chronic suprapubic catheter, chronic/recurrent UTIs, GERD, hypothyroidism, schizoaffective disorder, PTSD and transverse myelitis who presented 2/5/2021 after being sent by her PCP for possible UTI and suprapubic catheter malfunction. Patient is morbidly obese with a pannus that rests over her suprapubic area. Area was erythemic, painful with purulent discharge.  The suprapubic catheter was placed about 1 year ago and she has managed by urologlilia King, however reports she usually ends up coming to the ED before being able to present to their office for scheduled changes.    Catheter was changed in the ED.  She was treated with dose of IV vancomycin, premedicated with Benadryl to 2 history of previous reactions, and morphine for pain.  She tolerated the vancomycin without any adverse effects.    She was seen and examined prior to discharge. She denies fevers, chills and has remained afebrile, no leukocytosis and no indications of acute sepsis.  She reports feeling back to her baseline level of functioning and is eager for discharge home today.    Therefore, she is discharged in good and stable condition to home with close outpatient follow-up.    Discharge Date  02/06/21    FOLLOW UP ITEMS POST DISCHARGE  Discharge Instructions per VANIA Carrera    -FU with PCP  -FU with Amber King  -Remain diligent with your suprapubic care. Keep the area clean and dry. Apply drain sponge, gauze, or paper towels to the area to wick the moisture.    DIET: As Tolerated    ACTIVITY: As tolerated    DIAGNOSIS: Malfunctioning suprapubic catheter -catheter replaced in the emergency department now functioning  appropriately    Return to ER if your suprapubic catheter malfunctions, you have fevers, chills, chest pain, shortness of breath, and/or uncontrolled pain.    DISCHARGE DIAGNOSES  Principal Problem:    Suprapubic catheter dysfunction (HCC) POA: Yes  Active Problems:    Diabetes mellitus type 2 in obese (HCC) POA: Yes  Resolved Problems:    Hypokalemia POA: Yes      FOLLOW UP  Future Appointments   Date Time Provider Department Center   2/8/2021  1:30 PM [address]   2/17/2021  9:00 AM La Lutz M.D. [facility] [address]   3/5/2021  9:30 AM [facility]   4/5/2021  9:35 AM Kayla Wise D.O. [facility] None     MEDICATIONS ON DISCHARGE     Medication List      CHANGE how you take these medications      Instructions   calcium carbonate 750 MG chewable tablet  What changed:   · when to take this  · reasons to take this  Commonly known as: TUMS EX   Take 2 Tabs by mouth every day.  Dose: 1,500 mg     traZODone 100 MG Tabs  What changed: when to take this  Commonly known as: DESYREL   Take 1 Tab by mouth every bedtime.  Dose: 100 mg     Trulicity 1.5 MG/0.5ML Sopn  What changed: additional instructions  Generic drug: Dulaglutide   Inject 0.5 mL under the skin every 7 days.  Dose: 0.5 mL     ziprasidone 40 MG Caps  What changed:   · how much to take  · how to take this  · when to take this  Commonly known as: GEODON   Take 1 tablet by mouth twice a day        CONTINUE taking these medications      Instructions   acetaminophen 500 MG Tabs  Commonly known as: TYLENOL   Take 1,000 mg by mouth one time as needed for Moderate Pain.  Dose: 1,000 mg     acetaZOLAMIDE  MG Cp12  Commonly known as: DIAMOX   Take 500 mg by mouth 2 times a day.  Dose: 500 mg     albuterol 108 (90 Base) MCG/ACT Aers inhalation aerosol   Inhale 2 Puffs every 6 hours as needed for Shortness of Breath.  Dose: 2 Puff     aspirin EC 81 MG Tbec  Commonly known as: ECOTRIN   Take 81 mg by mouth every  day.  Dose: 81 mg     baclofen 10 MG Tabs  Commonly known as: LIORESAL   Take 1 Tab by mouth every bedtime.  Dose: 10 mg     busPIRone 10 MG Tabs tablet  Commonly known as: BUSPAR   Take 10 mg by mouth 3 times a day.  Dose: 10 mg     Corlanor 7.5 MG Tabs tablet  Generic drug: ivabradine   Take 7.5 mg by mouth 2 times a day, with meals.  Dose: 7.5 mg     fluoxetine 40 MG capsule  Commonly known as: PROZAC   Take 1 Cap by mouth every day.  Dose: 40 mg     ipratropium-albuterol 0.5-2.5 (3) MG/3ML nebulizer solution  Commonly known as: DUONEB   Take 3 mL by nebulization every four hours as needed for Shortness of Breath. Nebulizer  Dose: 3 mL     levothyroxine 100 MCG Tabs  Commonly known as: SYNTHROID   Take 100 mcg by mouth Every morning on an empty stomach.  Dose: 100 mcg     Melatonin 10 MG Tabs   Take 10 mg by mouth every bedtime.  Dose: 10 mg     mycophenolate 500 MG tablet  Commonly known as: CellCept   Take 2 Tabs by mouth 2 times a day.  Dose: 1,000 mg     Myrbetriq 50 MG Tb24  Generic drug: Mirabegron ER   Take 50 mg by mouth every day.  Dose: 50 mg     NexIUM 40 MG Pack  Generic drug: esomeprazole magnesium   Take 40 mg by mouth every morning before breakfast.  Dose: 40 mg     nitrofurantoin 100 MG Caps  Commonly known as: MACROBID   Take 100 mg by mouth 2 times a day. 5 day course  Dose: 100 mg     ondansetron 4 MG Tbdp  Commonly known as: ZOFRAN ODT   Take 1 Tab by mouth every 6 hours as needed for Nausea.  Dose: 4 mg     OXcarbazepine 300 MG Tabs  Commonly known as: Trileptal   Take 1 Tab by mouth 2 times a day.  Dose: 300 mg     oxybutynin 5 MG Tabs  Commonly known as: DITROPAN   Take 1 Tab by mouth 3 times a day.  Dose: 5 mg     potassium chloride 20 MEQ Pack  Commonly known as: KLOR-CON   Take 2 Packets by mouth 2 times a day.  Dose: 40 mEq     pregabalin 300 MG capsule  Commonly known as: Lyrica   Take 1 Cap by mouth 2 times a day for 60 days.  Dose: 300 mg     sodium bicarbonate 325 MG Tabs   Take 650  "mg by mouth 2 times a day.  Dose: 650 mg     topiramate 50 MG tablet  Commonly known as: TOPAMAX   Take 50 mg by mouth 2 times a day.  Dose: 50 mg     vitamin D (Ergocalciferol) 1.25 MG (66938 UT) Caps capsule  Commonly known as: DRISDOL   Take 50,000 Units by mouth every 7 days.  Dose: 50,000 Units            Allergies  Allergies   Allergen Reactions   • Depakote [Divalproex Sodium] Unspecified     Muscle spasms/muscle pain and weakness     • Amitriptyline Unspecified     Headaches     • Aripiprazole [Abilify] Unspecified     Headaches/muscle twitching     • Clindamycin Nausea     Even with food     • Flagyl [Metronidazole Hcl] Unspecified     \"eye problems\"     • Flomax [Tamsulosin Hydrochloride] Swelling   • Levaquin Unspecified     Severe muscle cramps in legs  RXN=unknown   • Metformin Unspecified     Increased lactic acid      • Tape Rash     Tears skin off  coban with Tegaderm tape ok intermittently  RXN=ongoing   • Vancomycin Itching     Pt becomes flushed in face and chest.   RXN=7/10/16   • Wound Dressing Adhesive Hives     By pt report   • Ampicillin Rash     Pt reports that she received a rash    • Ciprofloxacin    • Keflex Rash     Pt states she gets a rash with this medication  Tolerates ceftriaxone   • Levofloxacin Unspecified     Leg muscle cramps   • Metronidazole Rash     .   • Penicillins Hives   • Sulfa Drugs Myalgia     Muscle pain and weakness   • Tamsulosin Swelling   • Valproic Acid Rash     .       DIET  Orders Placed This Encounter   Procedures   • Diet Order Diet: Level 6 - Soft and Bite Sized; Liquid level: Level 0 - Thin; Second Modifier: (optional): Consistent CHO (Diabetic)     Standing Status:   Standing     Number of Occurrences:   1     Order Specific Question:   Diet:     Answer:   Level 6 - Soft and Bite Sized [23]     Order Specific Question:   Liquid level     Answer:   Level 0 - Thin     Order Specific Question:   Second Modifier: (optional)     Answer:   Consistent CHO " (Diabetic) [4]       ACTIVITY  As tolerated.  Weight bearing as tolerated    CONSULTATIONS  None    PROCEDURES  None    LABORATORY  Lab Results   Component Value Date    SODIUM 137 02/06/2021    POTASSIUM 4.0 02/06/2021    CHLORIDE 114 (H) 02/06/2021    CO2 13 (L) 02/06/2021    GLUCOSE 130 (H) 02/06/2021    BUN 8 02/06/2021    CREATININE 0.48 (L) 02/06/2021    CREATININE 0.75 (L) 07/20/2010    GLOMRATE >59 07/20/2010        Lab Results   Component Value Date    WBC 4.0 (L) 02/05/2021    WBC 6.1 07/20/2010    HEMOGLOBIN 12.6 02/05/2021    HEMATOCRIT 39.3 02/05/2021    PLATELETCT 185 02/05/2021        Total time of the discharge process exceeds 38 minutes.    I have performed a physical exam and reviewed and updated ROS and Assessment/Plan today (02/06/21). In review of the previous note there are no changes except as documented above    Please note that this dictation was created using voice recognition software. I have made every reasonable attempt to correct obvious errors, but there may be errors of grammar and possibly content that I did not discover before finalizing the note.    PER Carrera.

## 2021-02-06 NOTE — H&P
"Hospital Medicine History & Physical Note    Date of Service  2/5/2021    Primary Care Physician  Sue Preston M.D.    Consultants  None    Code Status  Full Code    Chief Complaint  Chief Complaint   Patient presents with   • Urinary Catheter Problem       History of Presenting Illness  30 y.o. female who presented 2/5/2021 after being sent by PCP for possible infection and suprapubic cath.  Patient reports that for the past few days she has been having erythema, pain, and discharge around her suprapubic cath site.  She additionally reports overall malaise and fatigue, fever (T 99.9), and chills.  She had a suprapubic cath placed 1 year ago, and has a changed every few months.  She reports doing suprapubic cath care as indicated.  She otherwise denies further complaints at this time.    ED: Vitals within normal limits.  WBC 4.0, potassium 3.5, bicarb 15, lactic acid 1.6, UA trace occult blood and calcium oxalate crystals.  CT abdomen 2 days prior without significant findings.  ED physician reports erythema and purulent drainage from suprapubic cath site.cath was changed in the ED.  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  She was also given morphine 4 mg.      Review of Systems  Review of Systems   Constitutional: Positive for chills, fever (T 99.9) and malaise/fatigue. Negative for weight loss.   HENT: Negative for hearing loss and sore throat.    Eyes: Negative for blurred vision and pain.   Respiratory: Negative for cough, hemoptysis, shortness of breath and wheezing.    Cardiovascular: Negative for chest pain, palpitations, orthopnea and leg swelling.   Gastrointestinal: Negative for abdominal pain, blood in stool, constipation, diarrhea, nausea and vomiting.   Genitourinary: Negative for dysuria and hematuria.        Suprapubic pain around cath site   Musculoskeletal: Negative for joint pain and myalgias. "   Skin: Positive for rash (around suprapubic cath site).   Neurological: Negative for dizziness, loss of consciousness and weakness.        Quadriplegic       Past Medical History   has a past medical history of Abdominal pain, Anginal syndrome, Apnea, sleep, Arrhythmia, Arthritis, ASTHMA, Atrial fibrillation ([facility]), Back pain, Borderline personality disorder ([facility]), Breath shortness, Bronchitis, Cardiac arrhythmia, Chickenpox, Chronic obstructive pulmonary disease ([facility]), Chronic UTI (9/18/2010), Cough, Daytime sleepiness, Depression, Diabetes ([facility]), Diarrhea, Disorder of thyroid, Fall, Fatigue, Frequent headaches, Gasping for breath, Gynecological disorder, Headache(784.0), Heart burn, History of falling, Indigestion, Migraine, 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[facility]), 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    Surgical History   has a past surgical history that includes neuro dest facet l/s w/ig sngl (4/21/2015); recovery (1/27/2016); delmar by laparoscopy (8/29/2010); lumbar fusion anterior (8/21/2012); other cardiac surgery (1/2016); tonsillectomy; bowel stimulator insertion (7/13/2016); gastroscopy with balloon dilatation (N/A, 1/4/2017); muscle biopsy (Right, 1/26/2017); other abdominal surgery; and laminotomy.     Family History  family history includes Genitourinary () Problems in her sister; Heart Disease in her maternal grandmother and mother; Hypertension in her maternal grandmother, maternal uncle, and mother; No Known Problems in her sister; Other in her mother and sister; Sleep Apnea in her mother.     Social History   reports that she has never  "smoked. She has never used smokeless tobacco. She reports previous drug use. Frequency: 7.00 times per week. Drug: Marijuana. She reports that she does not drink alcohol.    Allergies  Allergies   Allergen Reactions   • Depakote [Divalproex Sodium] Unspecified     Muscle spasms/muscle pain and weakness     • Amitriptyline Unspecified     Headaches     • Aripiprazole [Abilify] Unspecified     Headaches/muscle twitching     • Clindamycin Nausea     Even with food     • Flagyl [Metronidazole Hcl] Unspecified     \"eye problems\"     • Flomax [Tamsulosin Hydrochloride] Swelling   • Levaquin Unspecified     Severe muscle cramps in legs  RXN=unknown   • Metformin Unspecified     Increased lactic acid      • Tape Rash     Tears skin off  coban with Tegaderm tape ok intermittently  RXN=ongoing   • Vancomycin Itching     Pt becomes flushed in face and chest.   RXN=7/10/16   • Wound Dressing Adhesive Hives     By pt report   • Ampicillin Rash     Pt reports that she received a rash    • Ciprofloxacin    • Keflex Rash     Pt states she gets a rash with this medication  Tolerates ceftriaxone   • Levofloxacin Unspecified     Leg muscle cramps   • Metronidazole Rash     .   • Penicillins Hives   • Sulfa Drugs Myalgia     Muscle pain and weakness   • Tamsulosin Swelling   • Valproic Acid Rash     .       Medications  Prior to Admission Medications   Prescriptions Last Dose Informant Patient Reported? Taking?   Dulaglutide (TRULICITY) 1.5 MG/0.5ML Solution Pen-injector 1/29/2021 at unknown Patient No No   Sig: Inject 0.5 mL under the skin every 7 days.   Patient taking differently: Inject 0.5 mL under the skin every 7 days. On Fridays @ 2000   Melatonin 10 MG Tab 2/4/2021 at 2000 Patient Yes No   Sig: Take 10 mg by mouth every bedtime.   Mirabegron ER (MYRBETRIQ) 50 MG TABLET SR 24 HR 2/5/2021 at 0800 Patient No No   Sig: Take 50 mg by mouth every day.   OXcarbazepine (TRILEPTAL) 300 MG Tab 2/5/2021 at 0800 Patient No No   Sig: Take " 1 Tab by mouth 2 times a day.   acetaZOLAMIDE SR (DIAMOX) 500 MG CAPSULE SR 12 HR 2/5/2021 at 0800 Patient Yes No   Sig: Take 500 mg by mouth 2 times a day.   acetaminophen (TYLENOL) 500 MG Tab 2/5/2021 at 0800 Patient Yes No   Sig: Take 1,000 mg by mouth one time as needed for Moderate Pain.   albuterol 108 (90 Base) MCG/ACT Aero Soln inhalation aerosol 2/1/2021 at unknown Patient No No   Sig: Inhale 2 Puffs every 6 hours as needed for Shortness of Breath.   aspirin EC (ECOTRIN) 81 MG Tablet Delayed Response 2/5/2021 at 0800 Patient Yes No   Sig: Take 81 mg by mouth every day.   baclofen (LIORESAL) 10 MG Tab Not Taking at Unknown time Patient No No   Sig: Take 1 Tab by mouth every bedtime.   Patient not taking: Reported on 2/5/2021   busPIRone (BUSPAR) 10 MG Tab tablet 2/5/2021 at 0800 Patient Yes No   Sig: Take 10 mg by mouth 3 times a day.   calcium carbonate (TUMS EX) 750 MG chewable tablet 2/1/2021 at unknown Patient No No   Sig: Take 2 Tabs by mouth every day.   Patient taking differently: Chew 1,500 mg 1 time a day as needed.   esomeprazole magnesium (NEXIUM) 40 MG Pack >7 days ago at unknown Patient Yes No   Sig: Take 40 mg by mouth every morning before breakfast.   fluoxetine (PROZAC) 40 MG capsule 2/5/2021 at 0800 Patient No No   Sig: Take 1 Cap by mouth every day.   ipratropium-albuterol (DUONEB) 0.5-2.5 (3) MG/3ML nebulizer solution 2/5/2021 at 1500 Patient Yes No   Sig: Take 3 mL by nebulization every four hours as needed for Shortness of Breath. Nebulizer    ivabradine (CORLANOR) 7.5 MG Tab tablet 2/5/2021 at 0800 Patient Yes No   Sig: Take 7.5 mg by mouth 2 times a day, with meals.   levothyroxine (SYNTHROID) 100 MCG Tab 2/5/2021 at 0800 Patient Yes No   Sig: Take 100 mcg by mouth Every morning on an empty stomach.   mycophenolate (CELLCEPT) 500 MG tablet 2/5/2021 at 0800 Patient No No   Sig: Take 2 Tabs by mouth 2 times a day.   nitrofurantoin (MACROBID) 100 MG Cap 2/1/2021 at finished Patient Yes No    Sig: Take 100 mg by mouth 2 times a day. 5 day course   ondansetron (ZOFRAN ODT) 4 MG TABLET DISPERSIBLE >7 days ago at unknown Patient No No   Sig: Take 1 Tab by mouth every 6 hours as needed for Nausea.   oxybutynin (DITROPAN) 5 MG Tab 2/5/2021 at 0800 Patient No No   Sig: Take 1 Tab by mouth 3 times a day.   potassium chloride (KLOR-CON) 20 MEQ Pack 2/5/2021 at 0800 Patient No No   Sig: Take 2 Packets by mouth 2 times a day.   pregabalin (LYRICA) 300 MG capsule Not Taking at Unknown time Patient No No   Sig: Take 1 Cap by mouth 2 times a day for 60 days.   Patient not taking: Reported on 2/5/2021   sodium bicarbonate 325 MG Tab 2/5/2021 at 0800 Patient Yes No   Sig: Take 650 mg by mouth 2 times a day.   topiramate (TOPAMAX) 50 MG tablet 2/5/2021 at 0800 Patient Yes No   Sig: Take 50 mg by mouth 2 times a day.   traZODone (DESYREL) 100 MG Tab 2/4/2021 at 2000 Patient No No   Sig: Take 1 Tab by mouth every bedtime.   Patient taking differently: Take 100 mg by mouth every evening.   vitamin D, Ergocalciferol, (DRISDOL) 1.25 MG (46226 UT) Cap capsule unknown at unknown Patient Yes No   Sig: Take 50,000 Units by mouth every 7 days.   ziprasidone (GEODON) 40 MG Cap 2/5/2021 at 0800 Patient No No   Sig: Take 1 tablet by mouth twice a day   Patient taking differently: Take 40 mg by mouth 2 Times a Day. Take 1 tablet by mouth twice a day      Facility-Administered Medications: None       Physical Exam  Temp:  [36.7 °C (98 °F)] 36.7 °C (98 °F)  Pulse:  [83-85] 83  Resp:  [18-20] 18  BP: (106-123)/(62-69) 123/69  SpO2:  [98 %] 98 %    Physical Exam  Vitals signs and nursing note reviewed.   Constitutional:       General: She is not in acute distress.     Appearance: Normal appearance. She is obese. She is not ill-appearing.   HENT:      Mouth/Throat:      Mouth: Mucous membranes are moist.   Eyes:      Extraocular Movements: Extraocular movements intact.   Cardiovascular:      Rate and Rhythm: Normal rate and regular  rhythm.      Pulses: Normal pulses.      Heart sounds: Normal heart sounds. No murmur. No gallop.    Pulmonary:      Effort: Pulmonary effort is normal. No respiratory distress.      Breath sounds: No wheezing, rhonchi or rales.   Abdominal:      General: Abdomen is flat. Bowel sounds are normal.      Palpations: Abdomen is soft.      Tenderness: There is no abdominal tenderness.   Genitourinary:     Comments: Suprapubic cath site with erythema, no appreciable drainage  Musculoskeletal: Normal range of motion.         General: No deformity.      Right lower leg: No edema.      Left lower leg: No edema.   Skin:     General: Skin is warm and dry.      Coloration: Skin is not jaundiced.      Findings: Erythema (suprapubic site) present. No rash.   Neurological:      General: No focal deficit present.      Mental Status: She is alert and oriented to person, place, and time.      Motor: Weakness (diffuse but worse b/l LE) present.   Psychiatric:         Mood and Affect: Mood normal.         Laboratory:  Recent Labs     02/03/21  1750 02/05/21  1745   WBC 4.0* 4.0*   RBC 4.43 4.30   HEMOGLOBIN 13.0 12.6   HEMATOCRIT 40.2 39.3   MCV 90.7 91.4   MCH 29.3 29.3   MCHC 32.3* 32.1*   RDW 43.1 44.1   PLATELETCT 186 185   MPV 11.8 12.5     Recent Labs     02/03/21  1750 02/05/21  1745   SODIUM 139 143   POTASSIUM 3.9 3.5*   CHLORIDE 111 116*   CO2 16* 15*   GLUCOSE 99 107*   BUN 7* 8   CREATININE 0.63 0.57   CALCIUM 9.0 8.8     Recent Labs     02/03/21  1750 02/05/21  1745   ALTSGPT 18 18   ASTSGOT 18 18   ALKPHOSPHAT 73 67   TBILIRUBIN 0.2 0.2   GLUCOSE 99 107*         No results for input(s): NTPROBNP in the last 72 hours.      No results for input(s): TROPONINT in the last 72 hours.    Imaging:  No orders to display         Assessment/Plan:  I anticipate this patient is appropriate for observation status at this time.    * Suprapubic catheter dysfunction (HCC)- (present on admission)  Assessment & Plan  New onset pain,  erythema, drainage from suprapubic cath site  WBC 4.0, lactic acid 1.6, UA findings as above  Suprapubic cath changed in the ED  Status post Vanc 2.5 g, Benadryl 25 mg, and morphine 4 mg.  Replaced catheter  - c/w Vanco and benadryl  - F/u Blood Cultures  - suprapubic cath already changed  - Acetaminophen and morphine for symptom management    Hypokalemia- (present on admission)  Assessment & Plan  Takes K supplements at home  [id number] IV x1      Diabetes mellitus type 2 in obese (HCC)- (present on admission)  Assessment & Plan  Holding home meds  ISS    VTE: SCD, Lovenox

## 2021-02-06 NOTE — ASSESSMENT & PLAN NOTE
New onset pain, erythema, drainage from suprapubic cath site  WBC 4.0, lactic acid 1.6, UA findings as above  Suprapubic cath changed in the ED  Status post Vanc 2.5 g, Benadryl 25 mg, and morphine 4 mg.  Replaced catheter  - c/w Vanco and benadryl  - F/u Blood Cultures  - suprapubic cath already changed  - Acetaminophen and morphine for symptom management

## 2021-02-06 NOTE — PROGRESS NOTES
"Dr. Mendes aware of pt c/o bladder fullness with hazy mucoid urine draining in drainage bag at 0.  Order received for bladder scan which was done by \"Traction\" team.  Traction team reported to UC scan done over dressing and questioned results.  Pt calm and resting without acute complaint.  "

## 2021-02-06 NOTE — PROGRESS NOTES
Interim Summary:    Notified by RN unable to draw CBC this morning. Previous CBC reveals mild leukopenia, normal H/H and platelets without left-shift, neutrophilia, monocytosis or immature granulocytosis. No indication for repeat draw and will DC CBC lab draw at this time.     Sandoval Black D.O.

## 2021-02-06 NOTE — PROGRESS NOTES
CHARGE RN:  Pt made aware of discharge order, reports she does not have a ride home. Pt is x2 assist to transfer from bed to personal wheelchair.  notified.

## 2021-02-06 NOTE — ED NOTES
Med rec complete per interview with pt at bedside (med list at bedside). Allergies reviewed. Pt finished a 5 day course of macrobid 100 mg 1 cap bid on 02/01/21. Pt states that she is to start taking lyrica 300 mg 1 tab bid and baclofen 10 mg 1 tab qhs today, but has not yet taken a dose.

## 2021-02-06 NOTE — ED TRIAGE NOTES
Brought in by EMS from home, lives with grandmother. Pt with suprapubic cath, redness around site with yellow drainage. EMS states cath changed 1 week ago.    Per pt site with redness x 1 week, increased since yesterday with yellowish pus drainage. Pt states temp 99.9 this am while MD's office.    Catheter draining bunny urine with sediment noted.Pt states generalized weakness - unable to transfer to W/C, c/o right flank and suprapubic pain.    EMS vs: /81, HR 97, 95% on RA with GCS 15.

## 2021-02-06 NOTE — PROGRESS NOTES
Lactic acid sent to lab per order.  Lab obtained from new IV site which then became dysfunctional.  Two attempts without success.  IV Vancomycin finishing then potassium supplementation will be given.  Charge RN Mona aware and asked to assess pt for second site.

## 2021-02-06 NOTE — PROGRESS NOTES
Dr. ELIANE Black aware that pt is peripherally a difficult access/stick.  Attempts x 2 to place IV failed and although Chemistry and CBC draw, lab is unable to run the CBC as the specimen was compromised.  MD to cancel order based on clinical findings.  No redraw indicated.

## 2021-02-06 NOTE — CARE PLAN
Plan of care with patient and patient verbalized understanding of same.   Reviewed pain management, electrolyte supplementation and safety with patient. SCD applied.  Encouraged pt to move in bed with pt being self able to rotate hips, BLE on pillow support.  Anxiety support and managed with education support and redirection.    Problem: Pain Management  Goal: Pain level will decrease to patient's comfort goal  Outcome: PROGRESSING AS EXPECTED     Problem: Safety  Goal: Will remain free from injury  Outcome: PROGRESSING AS EXPECTED     Problem: Safety  Goal: Will remain free from falls  Outcome: PROGRESSING AS EXPECTED     Problem: Venous Thromboembolism (VTW)/Deep Vein Thrombosis (DVT) Prevention:  Goal: Patient will participate in Venous Thrombosis (VTE)/Deep Vein Thrombosis (DVT)Prevention Measures  Outcome: PROGRESSING AS EXPECTED     Problem: Psychosocial Needs:  Goal: Level of anxiety will decrease  Outcome: PROGRESSING AS EXPECTED     Problem: Fluid Volume:  Goal: Will maintain balanced intake and output  Outcome: PROGRESSING AS EXPECTED     Problem: Urinary Elimination:  Goal: Ability to reestablish a normal urinary elimination pattern will improve  Outcome: PROGRESSING AS EXPECTED     Problem: Skin Integrity  Goal: Risk for impaired skin integrity will decrease  Outcome: PROGRESSING AS EXPECTED     Problem: Mobility  Goal: Risk for activity intolerance will decrease  Outcome: PROGRESSING AS EXPECTED

## 2021-02-06 NOTE — DISCHARGE PLANNING
Anticipated Discharge Disposition:   Home with family    Action:   Received a call from CDU charge nurse stating that pt will need [facility] due to pt being non ambulatory and needing 2 people to help her transfer from bed to wheelchair. Pt apparently does not have anyone to help her transfer. APRN confirmed that pt is non ambulatory due to transverse myelitis.     Per dc assessment, pt lives with family members. ( grandma and aunt) prior to admission.     CM completed [facility] PCS form and transportation form. Requested for a  at 1pm.     Barriers to Discharge:   none    Plan:   Dc home today via [facility].

## 2021-02-06 NOTE — DISCHARGE PLANNING
note:  Received a call from DAVID Murray stating that [facility] is set to  at 1pm. Notified charge nurse Estuardo.

## 2021-02-06 NOTE — ED NOTES
Dr Sandhu changed suprapubic cath - replaced with #16 [place] cath to BSD.   Total Number Of Aks Treated (Optional To Report): 0

## 2021-02-06 NOTE — DISCHARGE INSTRUCTIONS
Discharge Instructions per VANIA Carrera    -FU with PCP  -FU with [facility]  -Remain diligent with your suprapubic care. Keep the area clean and dry. Apply drain sponge, gauze, or paper towels to the area to wick the moisture.    DIET: As Tolerated    ACTIVITY: As tolerated    DIAGNOSIS: Malfunctioning suprapubic catheter -catheter replaced in the emergency department now functioning appropriately    Return to ER if your suprapubic catheter malfunctions, you have fevers, chills, chest pain, shortness of breath, and/or uncontrolled pain.

## 2021-02-06 NOTE — PROGRESS NOTES
"Pharmacy Kinetics 30 y.o. female on vancomycin day # 2   2/6/2021    Currently on Vancomycin 2000 mg iv q12hr (1397, 1630)  Provider specified end date: Not specified    Indication for Treatment: SSTI    Pertinent history per medical record: Admitted on 2/5/2021 for possible infection secondary to suprapubic catheter per PCP referral.  Mildly febrile, malaised/fatigued, mildly leukopenic, mildly hypokalemic, w/ erythema and purulent drainage from catheter site.  PMH Supraventricular Tachycardia, Intracranial HTN, polypharmacy, COPD, Chronic UTI, Chronic Diarrhea, T2DM, PCOS, Transverse myelitis, TB, urinary incontinence, non-alcoholic Fatty Liver Disease (LFTs WNL presently).      Other antibiotics: N/A    Allergies: Depakote [divalproex sodium], Amitriptyline, Aripiprazole [abilify], Clindamycin, Flagyl [metronidazole hcl], Flomax [tamsulosin hydrochloride], Levaquin, Metformin, Tape, Vancomycin, Wound dressing adhesive, Ampicillin, Ciprofloxacin, Keflex, Levofloxacin, Metronidazole, Penicillins, Sulfa drugs, Tamsulosin, and Valproic acid     List concerns for renal function: Polypharmacy, BMI = 39, nephrotoxic drugs (oxcarbazepine, topiramate, mycophenolate, acetazolamide)    Pertinent cultures to date:   2/5:   Wound: NGTD  PBC: Pending  UA: Pending    [facility] nares swab if pneumonia is a concern (ordered/positive/negative/n-a): N/A    Recent Labs     02/03/21  1750 02/05/21  1745   WBC 4.0* 4.0*   NEUTSPOLYS 47.10 50.20     Recent Labs     02/03/21  1750 02/05/21  1745 02/06/21  0455   BUN 7* 8 8   CREATININE 0.63 0.57 0.48*   ALBUMIN 4.2 3.9  --      No results for input(s): VANCOTROUGH, VANCOPEAK, VANCORANDOM in the last 72 hours.    Intake/Output Summary (Last 24 hours) at 2/6/2021 0542  Last data filed at 2/6/2021 0523  Gross per 24 hour   Intake 120 ml   Output 100 ml   Net 20 ml      /66   Pulse 87   Temp 36.6 °C (97.8 °F) (Temporal)   Resp 18   Ht 1.651 m (5' 5\")   Wt 106 kg (234 lb 9.1 oz)   " SpO2 96%  Temp (24hrs), Av.6 °C (97.8 °F), Min:36.4 °C (97.5 °F), Max:36.7 °C (98 °F)      A/P   1. Vancomycin dose change: New Start  2. Next vancomycin level:  0400 (before 4th total dose, ordered)  3. Goal trough: 10-15mcg/ml  4. Comments: Loading dose given  @1847, maintenance dosing initiated at 20mg/kg (2000mg) q12 hours given CrCl better than expected for age.  Polypharmacy, large body habitus , and nephrotoxic medications may be confounders for renal clearance and will anticipate dose reduction indication after ~48 hours due to delayed saturation secondary to BMI of 39.  Pharmacy will continue to monitor.    Ernesto Fontenot, MadihaD

## 2021-02-06 NOTE — DISCHARGE PLANNING
Received Transport Form @ 1110  Spoke to Mavis @ [facility]  Transport is scheduled for 02/06/21 @1300 going to [facility]

## 2021-02-07 ENCOUNTER — APPOINTMENT (OUTPATIENT)
Dept: RADIOLOGY | Facility: MEDICAL CENTER | Age: 31
End: 2021-02-07
Attending: EMERGENCY MEDICINE
Payer: MEDICARE

## 2021-02-07 ENCOUNTER — HOSPITAL ENCOUNTER (EMERGENCY)
Facility: MEDICAL CENTER | Age: 31
End: 2021-02-08
Attending: EMERGENCY MEDICINE
Payer: MEDICARE

## 2021-02-07 DIAGNOSIS — F20.9 SCHIZOPHRENIA, UNSPECIFIED TYPE (HCC): ICD-10-CM

## 2021-02-07 DIAGNOSIS — R29.6 FREQUENT FALLS: ICD-10-CM

## 2021-02-07 DIAGNOSIS — S79.912A HIP INJURY, LEFT, INITIAL ENCOUNTER: ICD-10-CM

## 2021-02-07 DIAGNOSIS — G37.3 TRANSVERSE MYELITIS (HCC): ICD-10-CM
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
## 2021-02-07 DIAGNOSIS — F41.9 ANXIETY: ICD-10-CM

## 2021-02-07 DIAGNOSIS — S93.402A SPRAIN OF LEFT ANKLE, UNSPECIFIED LIGAMENT, INITIAL ENCOUNTER: ICD-10-CM

## 2021-02-07 LAB
BACTERIA WND AEROBE CULT: ABNORMAL
BACTERIA WND AEROBE CULT: ABNORMAL
GRAM STN SPEC: ABNORMAL
SIGNIFICANT IND 70042: ABNORMAL
SITE SITE: ABNORMAL
SOURCE SOURCE: ABNORMAL

## 2021-02-07 PROCEDURE — 99284 EMERGENCY DEPT VISIT MOD MDM: CPT

## 2021-02-07 PROCEDURE — 73610 X-RAY EXAM OF ANKLE: CPT | Mod: LT

## 2021-02-07 PROCEDURE — 73501 X-RAY EXAM HIP UNI 1 VIEW: CPT | Mod: LT

## 2021-02-07 PROCEDURE — 700111 HCHG RX REV CODE 636 W/ 250 OVERRIDE (IP): Performed by: EMERGENCY MEDICINE

## 2021-02-07 PROCEDURE — 96372 THER/PROPH/DIAG INJ SC/IM: CPT

## 2021-02-07 RX ORDER — HYDROMORPHONE HYDROCHLORIDE 1 MG/ML
1 INJECTION, SOLUTION INTRAMUSCULAR; INTRAVENOUS; SUBCUTANEOUS ONCE
Status: COMPLETED | OUTPATIENT
Start: 2021-02-07 | End: 2021-02-07

## 2021-02-07 RX ADMIN — HYDROMORPHONE HYDROCHLORIDE 1 MG: 1 INJECTION, SOLUTION INTRAMUSCULAR; INTRAVENOUS; SUBCUTANEOUS at 22:36

## 2021-02-07 ASSESSMENT — FIBROSIS 4 INDEX: FIB4 SCORE: 0.71

## 2021-02-08 ENCOUNTER — APPOINTMENT (OUTPATIENT)
Dept: RADIOLOGY | Facility: MEDICAL CENTER | Age: 31
End: 2021-02-08
Attending: EMERGENCY MEDICINE
Payer: MEDICARE

## 2021-02-08 ENCOUNTER — HOSPITAL ENCOUNTER (OUTPATIENT)
Dept: RADIOLOGY | Facility: MEDICAL CENTER | Age: 31
End: 2021-02-08
Attending: PHYSICIAN ASSISTANT
Payer: MEDICARE

## 2021-02-08 ENCOUNTER — HOSPITAL ENCOUNTER (OUTPATIENT)
Facility: MEDICAL CENTER | Age: 31
End: 2021-02-08
Attending: INTERNAL MEDICINE | Admitting: INTERNAL MEDICINE
Payer: MEDICARE

## 2021-02-08 ENCOUNTER — HOSPITAL ENCOUNTER (EMERGENCY)
Facility: MEDICAL CENTER | Age: 31
End: 2021-02-08
Attending: EMERGENCY MEDICINE
Payer: MEDICARE

## 2021-02-08 VITALS
OXYGEN SATURATION: 96 % | HEART RATE: 89 BPM | DIASTOLIC BLOOD PRESSURE: 74 MMHG | RESPIRATION RATE: 14 BRPM | TEMPERATURE: 98 F | WEIGHT: 220.46 LBS | SYSTOLIC BLOOD PRESSURE: 125 MMHG | HEIGHT: 65 IN | BODY MASS INDEX: 36.73 KG/M2

## 2021-02-08 VITALS
RESPIRATION RATE: 16 BRPM | TEMPERATURE: 98.9 F | DIASTOLIC BLOOD PRESSURE: 56 MMHG | BODY MASS INDEX: 36.61 KG/M2 | SYSTOLIC BLOOD PRESSURE: 105 MMHG | HEART RATE: 91 BPM | WEIGHT: 220 LBS | OXYGEN SATURATION: 97 %
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
## 2021-02-08 DIAGNOSIS — E86.0 DEHYDRATION: ICD-10-CM

## 2021-02-08 DIAGNOSIS — R19.7 DIARRHEA OF PRESUMED INFECTIOUS ORIGIN: ICD-10-CM

## 2021-02-08 LAB
ALBUMIN SERPL BCP-MCNC: 3.8 G/DL (ref 3.2–4.9)
ALBUMIN/GLOB SERPL: 1.7 G/DL
ALP SERPL-CCNC: 68 U/L (ref 30–99)
ALT SERPL-CCNC: 15 U/L (ref 2–50)
ANION GAP SERPL CALC-SCNC: 9 MMOL/L (ref 7–16)
APPEARANCE UR: CLEAR
AST SERPL-CCNC: 21 U/L (ref 12–45)
BACTERIA UR CULT: ABNORMAL
BACTERIA UR CULT: ABNORMAL
BASOPHILS # BLD AUTO: 0.2 % (ref 0–1.8)
BASOPHILS # BLD: 0.01 K/UL (ref 0–0.12)
BILIRUB SERPL-MCNC: 0.2 MG/DL (ref 0.1–1.5)
BILIRUB UR QL STRIP.AUTO: NEGATIVE
BUN SERPL-MCNC: 5 MG/DL (ref 8–22)
CALCIUM SERPL-MCNC: 9 MG/DL (ref 8.5–10.5)
CHLORIDE SERPL-SCNC: 115 MMOL/L (ref 96–112)
CO2 SERPL-SCNC: 16 MMOL/L (ref 20–33)
COLOR UR: YELLOW
CREAT SERPL-MCNC: 0.54 MG/DL (ref 0.5–1.4)
EOSINOPHIL # BLD AUTO: 0.16 K/UL (ref 0–0.51)
EOSINOPHIL NFR BLD: 3.2 % (ref 0–6.9)
ERYTHROCYTE [DISTWIDTH] IN BLOOD BY AUTOMATED COUNT: 46 FL (ref 35.9–50)
GLOBULIN SER CALC-MCNC: 2.2 G/DL (ref 1.9–3.5)
GLUCOSE BLD-MCNC: 103 MG/DL (ref 65–99)
GLUCOSE SERPL-MCNC: 96 MG/DL (ref 65–99)
GLUCOSE UR STRIP.AUTO-MCNC: NEGATIVE MG/DL
HCG SERPL QL: NEGATIVE
HCT VFR BLD AUTO: 40.4 % (ref 37–47)
HGB BLD-MCNC: 12.9 G/DL (ref 12–16)
IMM GRANULOCYTES # BLD AUTO: 0.01 K/UL (ref 0–0.11)
IMM GRANULOCYTES NFR BLD AUTO: 0.2 % (ref 0–0.9)
KETONES UR STRIP.AUTO-MCNC: NEGATIVE MG/DL
LEUKOCYTE ESTERASE UR QL STRIP.AUTO: NEGATIVE
LYMPHOCYTES # BLD AUTO: 1.36 K/UL (ref 1–4.8)
LYMPHOCYTES NFR BLD: 27.1 % (ref 22–41)
MCH RBC QN AUTO: 29.8 PG (ref 27–33)
MCHC RBC AUTO-ENTMCNC: 31.9 G/DL (ref 33.6–35)
MCV RBC AUTO: 93.3 FL (ref 81.4–97.8)
MICRO URNS: NORMAL
MONOCYTES # BLD AUTO: 0.35 K/UL (ref 0–0.85)
MONOCYTES NFR BLD AUTO: 7 % (ref 0–13.4)
NEUTROPHILS # BLD AUTO: 3.12 K/UL (ref 2–7.15)
NEUTROPHILS NFR BLD: 62.3 % (ref 44–72)
NITRITE UR QL STRIP.AUTO: NEGATIVE
NRBC # BLD AUTO: 0 K/UL
NRBC BLD-RTO: 0 /100 WBC
PH UR STRIP.AUTO: 6.5 [PH] (ref 5–8)
PLATELET # BLD AUTO: 164 K/UL (ref 164–446)
PMV BLD AUTO: 12.4 FL (ref 9–12.9)
POTASSIUM SERPL-SCNC: 4.2 MMOL/L (ref 3.6–5.5)
PROT SERPL-MCNC: 6 G/DL (ref 6–8.2)
PROT UR QL STRIP: NEGATIVE MG/DL
RBC # BLD AUTO: 4.33 M/UL (ref 4.2–5.4)
RBC UR QL AUTO: NEGATIVE
SIGNIFICANT IND 70042: ABNORMAL
SITE SITE: ABNORMAL
SODIUM SERPL-SCNC: 140 MMOL/L (ref 135–145)
SOURCE SOURCE: ABNORMAL
SP GR UR STRIP.AUTO: 1.01
UROBILINOGEN UR STRIP.AUTO-MCNC: 0.2 MG/DL
WBC # BLD AUTO: 5 K/UL (ref 4.8–10.8)

## 2021-02-08 PROCEDURE — 84703 CHORIONIC GONADOTROPIN ASSAY: CPT

## 2021-02-08 PROCEDURE — 700102 HCHG RX REV CODE 250 W/ 637 OVERRIDE(OP): Performed by: EMERGENCY MEDICINE

## 2021-02-08 PROCEDURE — 82962 GLUCOSE BLOOD TEST: CPT

## 2021-02-08 PROCEDURE — 70450 CT HEAD/BRAIN W/O DYE: CPT

## 2021-02-08 PROCEDURE — 700105 HCHG RX REV CODE 258: Performed by: EMERGENCY MEDICINE

## 2021-02-08 PROCEDURE — 80053 COMPREHEN METABOLIC PANEL: CPT

## 2021-02-08 PROCEDURE — 96374 THER/PROPH/DIAG INJ IV PUSH: CPT

## 2021-02-08 PROCEDURE — 73552 X-RAY EXAM OF FEMUR 2/>: CPT | Mod: RT

## 2021-02-08 PROCEDURE — 99285 EMERGENCY DEPT VISIT HI MDM: CPT

## 2021-02-08 PROCEDURE — A9270 NON-COVERED ITEM OR SERVICE: HCPCS | Performed by: EMERGENCY MEDICINE

## 2021-02-08 PROCEDURE — 700111 HCHG RX REV CODE 636 W/ 250 OVERRIDE (IP): Performed by: EMERGENCY MEDICINE

## 2021-02-08 PROCEDURE — 85025 COMPLETE CBC W/AUTO DIFF WBC: CPT

## 2021-02-08 PROCEDURE — 81003 URINALYSIS AUTO W/O SCOPE: CPT

## 2021-02-08 RX ORDER — ONDANSETRON 2 MG/ML
4 INJECTION INTRAMUSCULAR; INTRAVENOUS ONCE
Status: COMPLETED | OUTPATIENT
Start: 2021-02-08 | End: 2021-02-08

## 2021-02-08 RX ORDER — OXYCODONE HYDROCHLORIDE 10 MG/1
10 TABLET ORAL ONCE
Status: COMPLETED | OUTPATIENT
Start: 2021-02-08 | End: 2021-02-08

## 2021-02-08 RX ORDER — SODIUM CHLORIDE 9 MG/ML
1000 INJECTION, SOLUTION INTRAVENOUS ONCE
Status: COMPLETED | OUTPATIENT
Start: 2021-02-08 | End: 2021-02-08

## 2021-02-08 RX ORDER — HYDROCODONE BITARTRATE AND ACETAMINOPHEN 10; 325 MG/1; MG/1
1 TABLET ORAL ONCE
Status: COMPLETED | OUTPATIENT
Start: 2021-02-08 | End: 2021-02-08

## 2021-02-08 RX ADMIN — ONDANSETRON 4 MG: 2 INJECTION INTRAMUSCULAR; INTRAVENOUS at 17:05

## 2021-02-08 RX ADMIN — HYDROCODONE BITARTRATE AND ACETAMINOPHEN 1 TABLET: 10; 325 TABLET ORAL at 17:05

## 2021-02-08 RX ADMIN — SODIUM CHLORIDE 1000 ML: 9 INJECTION, SOLUTION INTRAVENOUS at 17:15

## 2021-02-08 RX ADMIN — OXYCODONE HYDROCHLORIDE 10 MG: 10 TABLET ORAL at 01:39

## 2021-02-08 ASSESSMENT — FIBROSIS 4 INDEX: FIB4 SCORE: 0.71

## 2021-02-08 ASSESSMENT — PAIN SCALES - WONG BAKER: WONGBAKER_NUMERICALRESPONSE: HURTS A LITTLE MORE

## 2021-02-08 NOTE — ED PROVIDER NOTES
ED Provider Note    CHIEF COMPLAINT  Chief Complaint   Patient presents with   • Ankle Pain     Left ankle   • Hip Pain     left hip. no shortening or rotation of leg.       HPI    Primary care provider: Sue Preston M.D.  Means of arrival: EMS  History obtained from: Patient  History limited by: Nothing    Kristin Balderrama is a 30 y.o. female who presents with left hip and ankle pain after a low level fall.  The patient has a very complicated medical history mostly related to a bout of transverse myelitis of her spine several years ago which is left her near paralyzed and bedbound.  She has frequent falls, unfortunately today she was on her commode and the chair wiggled a little and she fell down on her left side.  Does not think she struck her head did not lose consciousness no vision changes no vomiting.  No upper extremity chest back or neck pain.  She only complains of isolated nonradiating moderate left hip and ankle pain.  No obvious deformities or open wounds or bruising.  Patient was initially seen by a fire crew which was called to help get her into her bed.  She then saw home health visiting service that obtained x-rays apparently of her left hip and ankle and saw no obvious fracture, so home health visiting service told the patient to be evaluated further in the ER potentially with something like CT imaging.  They were worried about an occult fracture that did not show up on x-ray.  She took acetaminophen today without significant relief.  Pain is aggravated by palpation only.  She cannot bear weight so has not.  Denies any other recent illness or medical complaints, she was just discharged from the hospital yesterday.  No known close Covid contacts.    She reports a long history of a suprapubic catheter, but she self discontinued it today because it was irritating her so much it has been bothering her a lot lately and she does get frequent UTIs.    REVIEW OF SYSTEMS  Constitutional: Negative  for fever or chills.   HENT: Negative for rhinorrhea or sore throat.    Eyes: Negative for double or blurry vision  Respiratory: Negative for cough or shortness of breath.    Cardiovascular: Negative for chest pain or syncope or loss of consciousness.   Gastrointestinal: Negative for nausea, vomiting, or abdominal pain.   Genitourinary: Negative for dysuria or flank pain.   Musculoskeletal: Negative for back pain but positive for left hip and ankle pain.  No obvious deformities.  Skin: Negative for itching or rash.   Neurological: Negative for sensory or motor changes.     PAST MEDICAL HISTORY   has a past medical history of Abdominal pain, Anginal syndrome, Apnea, sleep, Arrhythmia, Arthritis, ASTHMA, Atrial fibrillation ([facility]), Back pain, Borderline personality disorder ([facility]), Breath shortness, Bronchitis, Cardiac arrhythmia, Chickenpox, Chronic UTI (9/18/2010), Cough, Daytime sleepiness, Depression, Diabetes ([facility]), Diarrhea, Disorder of thyroid, Fall, Fatigue, Frequent headaches, Gasping for breath, Gynecological disorder, Headache(784.0), Heart burn, History of falling, Indigestion, Migraine, 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[facility]), 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    PAST FAMILY HISTORY  Family History   Problem Relation Age of Onset   • Hypertension Mother    • Sleep Apnea Mother    • Heart Disease Mother    • Other Mother         hypothryod   • Hypertension Maternal Uncle    • Heart Disease Maternal Grandmother    • Hypertension Maternal Grandmother    • No Known Problems Sister    • Other Sister         Narcolepsy;fibromyalsia;bone;nerve   •  "Genitourinary () Problems Sister         endometriosis       SOCIAL HISTORY  Social History     Tobacco Use   • Smoking status: Never Smoker   • Smokeless tobacco: Never Used   Substance and Sexual Activity   • Alcohol use: No     Alcohol/week: 0.0 oz     Frequency: Never     Binge frequency: Never   • Drug use: Not Currently     Frequency: 7.0 times per week     Types: Marijuana   • Sexual activity: Not Currently     Birth control/protection: Pill       SURGICAL HISTORY   has a past surgical history that includes neuro dest facet l/s w/ig sngl (4/21/2015); recovery (1/27/2016); delmar by laparoscopy (8/29/2010); lumbar fusion anterior (8/21/2012); other cardiac surgery (1/2016); tonsillectomy; bowel stimulator insertion (7/13/2016); gastroscopy with balloon dilatation (N/A, 1/4/2017); muscle biopsy (Right, 1/26/2017); other abdominal surgery; and laminotomy.    CURRENT MEDICATIONS  See MAR.    ALLERGIES  Allergies   Allergen Reactions   • Depakote [Divalproex Sodium] Unspecified     Muscle spasms/muscle pain and weakness     • Amitriptyline Unspecified     Headaches     • Aripiprazole [Abilify] Unspecified     Headaches/muscle twitching     • Clindamycin Nausea     Even with food     • Flagyl [Metronidazole Hcl] Unspecified     \"eye problems\"     • Flomax [Tamsulosin Hydrochloride] Swelling   • Levaquin Unspecified     Severe muscle cramps in legs  RXN=unknown   • Metformin Unspecified     Increased lactic acid      • Tape Rash     Tears skin off  coban with Tegaderm tape ok intermittently  RXN=ongoing   • Vancomycin Itching     Pt becomes flushed in face and chest.   RXN=7/10/16   • Wound Dressing Adhesive Hives     By pt report   • Ampicillin Rash     Pt reports that she received a rash    • Ciprofloxacin    • Keflex Rash     Pt states she gets a rash with this medication  Tolerates ceftriaxone   • Levofloxacin Unspecified     Leg muscle cramps   • Metronidazole Rash     .   • Penicillins Hives   • Sulfa Drugs " "Myalgia     Muscle pain and weakness   • Tamsulosin Swelling   • Valproic Acid Rash     .       PHYSICAL EXAM  VITAL SIGNS: /60   Pulse 87   Temp 36.6 °C (97.9 °F) (Oral)   Resp 18   Ht 1.651 m (5' 5\")   Wt 100 kg (220 lb 7.4 oz)   SpO2 97%   BMI 36.69 kg/m²    Pulse ox interpretation: On room air, I interpret this pulse ox as normal.  Constitutional: Chronically ill-appearing sitting up in mild distress.  HEENT: Normocephalic, atraumatic. Posterior pharynx clear, mucous membranes dry.  Eyes:  EOMI. Normal sclerae.  Neck: Supple, nontender.  Chest/Pulmonary: Slightly diminished to ausculation bilaterally, no wheezes or rhonchi.  Cardiovascular: Regular rate and rhythm, no obvious murmur.  Symmetric 2+ DP and PT pulses in both feet.  Abdomen: Soft, nontender; no rebound, guarding, or masses.  Back: No CVA or midline tenderness.   Musculoskeletal: No deformity, tenderness to the left ankle and left hip.  Neuro: Alert, chronically weak in both lower extremities, normal sensation no facial asymmetry.  Psych: Flat affect but cooperative.  Skin: No rashes, lacerations, or open wounds; warm and dry.      DIAGNOSTIC STUDIES / PROCEDURES      RADIOLOGY  DX-ANKLE 3+ VIEWS LEFT   Final Result      No radiographic evidence of acute traumatic injury.      DX-HIP-UNILATERAL-WITH PELVIS-1 VIEW LEFT   Final Result      No radiographic evidence of acute traumatic injury.          COURSE & MEDICAL DECISION MAKING    This is a 30 y.o. female who presents with left hip and ankle pain after a low level fall.    Differential Diagnosis includes but is not limited to:  Fall, contusion, fracture, dislocation    ED Course:  This is an unfortunate 30-year-old female with a very complicated past medical history mostly related to longstanding debility from transverse myelitis.  Has frequent falls, apparently had x-rays of left hip and ankle today but they did not show any obvious injuries and so she was told to come to the ER for " further work-up.  With this report I think is reasonable to obtain a CT scan of the patient's pelvis, I will treat her pain with subcutaneous hydromorphone as I need to keep her n.p.o. in case a surgical process is identified on work-up.    After ordering CT scan CT tech thankfully notified me that the patient has had actually more than 120 CT scans at this facility alone, and she is only 30 years old.  I know she has a very complicated medical history, but she is currently bedbound and nonambulatory, keeping this in consideration if there is an occult pelvic fracture I should identify it, but actually it would not change much of her management at this time.  She is currently bedbound and only gets around with assistance in a wheelchair, I shared with the patient this concerning fact that she has had so many CT scans and that she has been exposed to a large amount of medical radiation, understanding this may increase her risk of cancer in the future she would like to actually defer CT imaging, I am not able to identify or see these home health x-rays so I will repeat x-rays of the joints of concern and reassess.    Nothing acute on x-rays of the patient's hip or ankle.  If there was an occult pelvic fracture it would be nonoperative and the patient is bedbound anyways, so I do not think management would change.  Perhaps she has sustained an ankle sprain there is not significant swelling but I will place her in a splint and have her follow-up with orthopedics in 1 week.    Regarding the patient's suprapubic catheter, she removed it but she can still void occasionally from below and she does not want to have it anymore.  I discussed risks benefits and alternatives, and she clearly has decision-making capacity even though I disagree with this decision.  I obviously cannot force her to take a suprapubic catheter and she feels comfortable being able to care for this by herself and with the assistance she has at home.   Patient is hemodynamically stable pain controlled, given she is bedbound [facility] transportation will be provided to get her home safely.  The patient will return immediately for any new or worsening symptoms or increase in falls.    Medications   HYDROmorphone pf (DILAUDID) injection 1 mg (1 mg Subcutaneous Given 2/7/21 2236)   oxyCODONE immediate release (ROXICODONE) tablet 10 mg (10 mg Oral Given 2/8/21 0139)       FINAL IMPRESSION  1. Fall, initial encounter    2. Hip injury, left, initial encounter    3. Sprain of left ankle, unspecified ligament, initial encounter    4. 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HCC)    5. Schizophrenia, unspecified type (HCC)    6. Anxiety    7. Frequent falls        PRESCRIPTIONS  New Prescriptions    No medications on file       FOLLOW UP  Sue Preston M.D.  [address]  [address]  [facility] [id number] [phone]    Schedule an appointment as soon as possible for a visit in 2 days      Noman Abdul M.D.  [address]  [facility] [id number] [phone]    Schedule an appointment as soon as possible for a visit in 1 week  for recheck with orthopedics    [facility], Emergency Dept  [address] [address] [phone]  Today  If you have ANY new or worse symptoms!      -DISCHARGE-       Results, exam findings, clinical impression, presumed diagnosis, treatment options, and strict return precautions were discussed with the patient, and they verbalized understanding, agreed with, and appreciated the plan of care.    Pertinent Imaging studies reviewed and verified by myself, as well as nursing notes and the patient's past medical, family, and social histories (See chart for details).    The patient is referred to a primary physician for blood pressure management, diabetic screening, and for all other preventative health concerns.     Portions of this record were made with voice recognition software.  Despite my review, spelling/grammar/context  errors may still remain.  Interpretation of this chart should be taken in this context.    Electronically signed by Gabe Zhao M.D. on 2/8/2021 at 2:11 AM.

## 2021-02-08 NOTE — ED PROVIDER NOTES
ED Provider Note    Scribed for Terence Ruff M.D. by Sima Watts. 2/8/2021, 2:36 PM.    Primary care provider: Sue Preston M.D.  Means of arrival: Walk in  History obtained from: Patient  History limited by: None    CHIEF COMPLAINT  Chief Complaint   Patient presents with   • T-5000 GLF     pt was released from hospital at 3 am after a fall and fell again when she got home hitting head also c/o right hip pain    • Headache   • Hip Pain     right hip pain    • Tremors     hst of same per mother        HPI  Kristin Balderrama is a 30 y.o. female with a history of transverse myelitis who presents to the Emergency Department for evaluation of ground-level fall onset this morning. The patient was recently released from the hospital after evaluation of ground-level fall with injury to her right hip.The patient states that she fell this morning off of her bed and injured her right foot. The patient began shaking violently after experiencing a headache prior to arrival, although she was coherent during this episode. She admits to additional symptoms of right leg pain, posterior head pain, and hot and cold flashes, but denies chest pain, fever, dysuria, or abdominal pain. She denies alleviating factors. Patient has trouble recalling her events leading up to injury. The patient has a history of fluid in her brain.      REVIEW OF SYSTEMS  Pertinent positives include right leg pain, posterior head pain, and hot and cold flashes. Pertinent negatives include no chest pain, fever, dysuria, abdominal pain.  All other systems reviewed and negative.    PAST MEDICAL HISTORY   has a past medical history of Abdominal pain, Anginal syndrome, Apnea, sleep, Arrhythmia, Arthritis, ASTHMA, Atrial fibrillation (HCC), Back pain, Borderline personality disorder (HCC), Breath shortness, Bronchitis, Cardiac arrhythmia, Chickenpox, Chronic UTI (9/18/2010), Cough, Daytime sleepiness, Depression, Diabetes (HCC), Diarrhea, Disorder of  thyroid, Fall, Fatigue, Frequent headaches, Gasping for breath, Gynecological disorder, Headache(784.0), Heart burn, History of falling, Indigestion, Migraine, 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HCC), 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HCC), Nausea, Obesity, Other fatigue (6/29/2020), Pain (08-15-12), Painful joint, PCOS (polycystic ovarian syndrome), Pneumonia (2012), Psychosis (HCC), Ringing in ears, Scoliosis, Shortness of breath, Sinus tachycardia (10/31/2013), Sleep apnea, Snoring, Tonsillitis, 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[facility]), Tuberculosis, Urinary bladder disorder, Urinary incontinence, Weakness, and Wears glasses.    SURGICAL HISTORY   has a past surgical history that includes neuro dest facet l/s w/ig sngl (4/21/2015); recovery (1/27/2016); delmar by laparoscopy (8/29/2010); lumbar fusion anterior (8/21/2012); other cardiac surgery (1/2016); tonsillectomy; bowel stimulator insertion (7/13/2016); gastroscopy with balloon dilatation (N/A, 1/4/2017); muscle biopsy (Right, 1/26/2017); other abdominal surgery; and laminotomy.    SOCIAL HISTORY  Social History     Tobacco Use   • Smoking status: Never Smoker   • Smokeless tobacco: Never Used   Substance Use Topics   • Alcohol use: No     Alcohol/week: 0.0 oz     Frequency: Never     Binge frequency: Never   • Drug use: Not Currently     Frequency: 7.0 times per week     Types: Marijuana      Social History     Substance and Sexual Activity   Drug Use Not Currently   • Frequency: 7.0 times per week   • Types: Marijuana       FAMILY HISTORY  Family History   Problem Relation Age of Onset   • Hypertension Mother    • Sleep Apnea Mother    • Heart Disease Mother    • Other Mother         hypothryod   • Hypertension Maternal Uncle    • Heart Disease Maternal Grandmother    • Hypertension Maternal Grandmother    • No Known Problems Sister    • Other Sister         Narcolepsy;fibromyalsia;bone;nerve   • Genitourinary () Problems Sister         endometriosis       CURRENT MEDICATIONS  Home  "Medications     Reviewed by Kaitlin Lott R.N. (Registered Nurse) on 02/08/21 at 1414  Med List Status: <None>   Medication Last Dose Status   acetaminophen (TYLENOL) 500 MG Tab  Active   acetaZOLAMIDE SR (DIAMOX) 500 MG CAPSULE SR 12 HR  Active   albuterol 108 (90 Base) MCG/ACT Aero Soln inhalation aerosol  Active   aspirin EC (ECOTRIN) 81 MG Tablet Delayed Response  Active   baclofen (LIORESAL) 10 MG Tab  Active   busPIRone (BUSPAR) 10 MG Tab tablet  Active   calcium carbonate (TUMS EX) 750 MG chewable tablet  Active   Dulaglutide (TRULICITY) 1.5 MG/0.5ML Solution Pen-injector  Active   esomeprazole magnesium (NEXIUM) 40 MG Pack  Active   fluoxetine (PROZAC) 40 MG capsule  Active   ipratropium-albuterol (DUONEB) 0.5-2.5 (3) MG/3ML nebulizer solution  Active   ivabradine (CORLANOR) 7.5 MG Tab tablet  Active   levothyroxine (SYNTHROID) 100 MCG Tab  Active   Melatonin 10 MG Tab  Active   Mirabegron ER (MYRBETRIQ) 50 MG TABLET SR 24 HR  Active   mycophenolate (CELLCEPT) 500 MG tablet  Active   nitrofurantoin (MACROBID) 100 MG Cap  Active   ondansetron (ZOFRAN ODT) 4 MG TABLET DISPERSIBLE  Active   OXcarbazepine (TRILEPTAL) 300 MG Tab  Active   oxybutynin (DITROPAN) 5 MG Tab  Active   potassium chloride (KLOR-CON) 20 MEQ Pack  Active   pregabalin (LYRICA) 300 MG capsule  Active   sodium bicarbonate 325 MG Tab  Active   topiramate (TOPAMAX) 50 MG tablet  Active   traZODone (DESYREL) 100 MG Tab  Active   vitamin D, Ergocalciferol, (DRISDOL) 1.25 MG (46254 UT) Cap capsule  Active   ziprasidone (GEODON) 40 MG Cap  Active                ALLERGIES  Allergies   Allergen Reactions   • Depakote [Divalproex Sodium] Unspecified     Muscle spasms/muscle pain and weakness     • Amitriptyline Unspecified     Headaches     • Aripiprazole [Abilify] Unspecified     Headaches/muscle twitching     • Clindamycin Nausea     Even with food     • Flagyl [Metronidazole Hcl] Unspecified     \"eye problems\"     • Flomax [Tamsulosin " Hydrochloride] Swelling   • Levaquin Unspecified     Severe muscle cramps in legs  RXN=unknown   • Metformin Unspecified     Increased lactic acid      • Tape Rash     Tears skin off  coban with Tegaderm tape ok intermittently  RXN=ongoing   • Vancomycin Itching     Pt becomes flushed in face and chest.   RXN=7/10/16   • Wound Dressing Adhesive Hives     By pt report   • Ampicillin Rash     Pt reports that she received a rash    • Ciprofloxacin    • Keflex Rash     Pt states she gets a rash with this medication  Tolerates ceftriaxone   • Levofloxacin Unspecified     Leg muscle cramps   • Metronidazole Rash     .   • Penicillins Hives   • Sulfa Drugs Myalgia     Muscle pain and weakness   • Tamsulosin Swelling   • Valproic Acid Rash     .       PHYSICAL EXAM  VITAL SIGNS: /68   Pulse 94   Temp 37.6 °C (99.7 °F) (Temporal)   Resp 16   Wt 99.8 kg (220 lb)   SpO2 98%   BMI 36.61 kg/m²     Constitutional: Well developed, Well nourished, No acute distress, Non-toxic appearance.   HENT: Normocephalic, Atraumatic, mucous membranes moist, no erythema, exudates, swelling, or masses, nares patent  Eyes: nonicteric  Neck: Supple, no meningismus  Lymphatic: No lymphadenopathy noted.   Cardiovascular: Regular rate and rhythm, no gallops rubs or murmurs  Lungs: Clear bilaterally   Abdomen: Bowel sounds normal, Soft, No tenderness  Skin: Warm, Dry, no rash  Back: No tenderness, No CVA tenderness.   Genitalia: Deferred  Rectal: Deferred  Extremities: No edema, Left lower extremity in splint, Tenderness over greater trochanter in right hip, negative log roll on left side, limited mobility in bilateral lower extremities  Neurologic: Alert, appropriate, follows commands, limited mobility, normal speech   Psychiatric: Affect normal    DIAGNOSTIC STUDIES / PROCEDURES    LABS  Results for orders placed or performed during the hospital encounter of 02/08/21   CBC WITH DIFFERENTIAL   Result Value Ref Range    WBC 5.0 4.8 - 10.8  K/uL    RBC 4.33 4.20 - 5.40 M/uL    Hemoglobin 12.9 12.0 - 16.0 g/dL    Hematocrit 40.4 37.0 - 47.0 %    MCV 93.3 81.4 - 97.8 fL    MCH 29.8 27.0 - 33.0 pg    MCHC 31.9 (L) 33.6 - 35.0 g/dL    RDW 46.0 35.9 - 50.0 fL    Platelet Count 164 164 - 446 K/uL    MPV 12.4 9.0 - 12.9 fL    Neutrophils-Polys 62.30 44.00 - 72.00 %    Lymphocytes 27.10 22.00 - 41.00 %    Monocytes 7.00 0.00 - 13.40 %    Eosinophils 3.20 0.00 - 6.90 %    Basophils 0.20 0.00 - 1.80 %    Immature Granulocytes 0.20 0.00 - 0.90 %    Nucleated RBC 0.00 /100 WBC    Neutrophils (Absolute) 3.12 2.00 - 7.15 K/uL    Lymphs (Absolute) 1.36 1.00 - 4.80 K/uL    Monos (Absolute) 0.35 0.00 - 0.85 K/uL    Eos (Absolute) 0.16 0.00 - 0.51 K/uL    Baso (Absolute) 0.01 0.00 - 0.12 K/uL    Immature Granulocytes (abs) 0.01 0.00 - 0.11 K/uL    NRBC (Absolute) 0.00 K/uL   COMP METABOLIC PANEL   Result Value Ref Range    Sodium 140 135 - 145 mmol/L    Potassium 4.2 3.6 - 5.5 mmol/L    Chloride 115 (H) 96 - 112 mmol/L    Co2 16 (L) 20 - 33 mmol/L    Anion Gap 9.0 7.0 - 16.0    Glucose 96 65 - 99 mg/dL    Bun 5 (L) 8 - 22 mg/dL    Creatinine 0.54 0.50 - 1.40 mg/dL    Calcium 9.0 8.5 - 10.5 mg/dL    AST(SGOT) 21 12 - 45 U/L    ALT(SGPT) 15 2 - 50 U/L    Alkaline Phosphatase 68 30 - 99 U/L    Total Bilirubin 0.2 0.1 - 1.5 mg/dL    Albumin 3.8 3.2 - 4.9 g/dL    Total Protein 6.0 6.0 - 8.2 g/dL    Globulin 2.2 1.9 - 3.5 g/dL    A-G Ratio 1.7 g/dL   URINALYSIS (UA)    Specimen: Urine   Result Value Ref Range    Color Yellow     Character Clear     Specific Gravity 1.007 <1.035    Ph 6.5 5.0 - 8.0    Glucose Negative Negative mg/dL    Ketones Negative Negative mg/dL    Protein Negative Negative mg/dL    Bilirubin Negative Negative    Urobilinogen, Urine 0.2 Negative    Nitrite Negative Negative    Leukocyte Esterase Negative Negative    Occult Blood Negative Negative    Micro Urine Req see below    HCG QUAL SERUM   Result Value Ref Range    Beta-Hcg Qualitative Serum Negative  Negative   ESTIMATED GFR   Result Value Ref Range    GFR If African American >60 >60 mL/min/1.73 m 2    GFR If Non African American >60 >60 mL/min/1.73 m 2     *Note: Due to a large number of results and/or encounters for the requested time period, some results have not been displayed. A complete set of results can be found in Results Review.     All labs reviewed by me.       RADIOLOGY  CT-HEAD W/O   Final Result      No acute intracranial abnormality.      DX-FEMUR-2+ RIGHT   Final Result      No RIGHT femur fracture.        The radiologist's interpretation of all radiological studies have been reviewed by me.    COURSE & MEDICAL DECISION MAKING  Nursing notes, VS, PMSFHx reviewed in chart.     2:36 PM Patient seen and examined at bedside. I reviewed the patient's records from her ED visit yesterday which display negative X-Rays for left ankle and left hip. Ordered for DX-Femur 2+ Right, CT-Head, CMP, UA, HCG qual serum, and CBC with diff to evaluate.    5:00 PM Patient was reevaluated at bedside. Patient complains of pain and will be treated with Norco  mg PO and Zofran 4 mg IV.     5:06 PM Reviewed patients labs and imaging. The patient will be treated with IV fluids for dehydration and discharged. The patient agrees with plan of care.     HYDRATION: Based on the patient's presentation of Dehydration the patient was given IV fluids. IV Hydration was used because oral hydration was not adequate alone. Upon recheck following hydration, the patient was well improved.    Decision Making:  This is a 30 y.o. year old female who presents status post a fall where she injured her right hip.  There is no evidence of fracture of her femur on x-ray.  The patient is wheelchair-bound.  She also appears to be somewhat dehydrated I will give her fluids prior to discharge.  However her labs including CBC and electrolytes are reassuring.  Vitals have been reassuring.     The patient will return for new or worsening symptoms  and is stable at the time of discharge.    DISPOSITION:  Patient will be discharged home in stable condition.    FOLLOW UP:  Sue Preston M.D.  [address]  [address]  [facility] [id number] [phone]    Schedule an appointment as soon as possible for a visit today          FINAL IMPRESSION  1. Fall, initial encounter    2. Dehydration          Sima RODRIGUEZ (Scribe), am scribing for, and in the presence of, Terence Ruff M.D..    Electronically signed by: Sima Watts (Scribe), 2/8/2021    Terence RODRIGUEZ M.D. personally performed the services described in this documentation, as scribed by Sima Watts in my presence, and it is both accurate and complete. C    The note accurately reflects work and decisions made by me.  Terence Ruff M.D.  2/8/2021  5:24 PM

## 2021-02-08 NOTE — DISCHARGE INSTRUCTIONS
You were seen and evaluated in the Emergency Department at [facility] for:     Hip and ankle pain after fall.    You had the following tests and studies:    Thankfully x-rays look good today! We think you are safe to go home. Use the ankle splint provided, and return if any worse.     You received the following medications:    Hydromorphone injection.    ----------------------------    Please make sure to follow up with:    Your primary care provider for recheck and routine care, [facility] for any persistent pain in 1 week, if you have any recurrent falls, fall, numbness, fevers, or any other new/worse symptoms please come right back immediately!    Good luck, we hope you get better soon!  ----------------------------    We always encourage patients to return IMMEDIATELY if they have:  Increased or changing pain, passing out, fevers over 100.4 (taken in your mouth or rectally) for more than 2 days, redness or swelling of skin or tissues, feeling like your heart is beating fast, chest pain that is new or worsening, trouble breathing, feeling like your throat is closing up and can not breath, inability to walk, weakness of any part of your body, new dizziness, severe bleeding that won't stop from any part of your body, if you can't eat or drink, or if you have any other concerns.   If you feel worse, please know that you can always return with any questions, concerns, worse symptoms, or you are feeling unsafe. We certainly cannot say for sure that we have ruled out every illness or dangerous disease, but we feel that at this specific time, your exam, tests, and vital signs like heart rate and blood pressure are safe for discharge.

## 2021-02-08 NOTE — DISCHARGE PLANNING
Medical Social Work    Referral: Pt Transportation returning home    Intervention: KRYSTIAN contacted [facility] and spoke with [employer]. [employer] Form faxed. Bria confirmed pt pick-up time of 2:15am.     Plan: KRYSTIAN advised bedside RN that [facility] will transport pt at 2:15am

## 2021-02-08 NOTE — ED TRIAGE NOTES
Pt presents to ED via EMS with complaint of left ankle and left hip pain after falling this am. Pt states she is baseline bedbound but does scoot from bed to commode etc... Pt states that she had telehealth appt this am at time of fall and had mobile Xray which showed no fx. States she was told to come to ED for further eval. Speaking full and complete sentences without signs of distress. A/Ox4

## 2021-02-08 NOTE — ED TRIAGE NOTES
Pt to rm b13   Chief Complaint   Patient presents with   • T-5000 GLF     pt was released from hospital at 3 am after a fall and fell again when she got home hitting head also c/o right hip pain    • Headache   • Hip Pain     right hip pain    • Tremors     hst of same per mother

## 2021-02-09 NOTE — DOCUMENTATION QUERY
[facility]                                                                       Query Response Note      PATIENT:               HARLEY DE LA CRUZ  ACCT #:                  [id number]  MRN:                     [id number]  :                      1990  ADMIT DATE:       2021 12:25 PM  DISCH DATE:        2021 2:20 PM  RESPONDING  PROVIDER #:        [id number]           QUERY TEXT:    Your assistance is needed in determining the reason  for name of lab-Urine Culture that was ordered.  This service is non-covered by the diagnoses documented in the medical record.      Can you please provide an additional diagnosis that describes the sign or symptom that  (prompted/initiated) the name of lab-Urine Culture.    NOTE:  If an appropriate answer is not listed below, please respond with a new note.        The patient's Clinical Indicators include:  Urine Culture  Options provided:   -- Other related diagnosis, Please specify diagnosis demonstrating medical necessity for lab/imaging/other test.)   -- Unable to determine      Query created by: Keiry Jorgensen on 2021 2:46 AM    RESPONSE TEXT:    Other related diagnosis- history of ESBL          Electronically signed by:  CUONG BENITES MD 2021 4:13 PM

## 2021-02-10 ENCOUNTER — PATIENT OUTREACH (OUTPATIENT)
Dept: HEALTH INFORMATION MANAGEMENT | Facility: OTHER | Age: 31
End: 2021-02-10

## 2021-02-12 RX ORDER — MYCOPHENOLATE MOFETIL 500 MG/1
1000 TABLET ORAL 2 TIMES DAILY
Qty: 120 TABLET | Refills: 0 | Status: SHIPPED | OUTPATIENT
Start: 2021-02-12 | End: 2021-09-24

## 2021-02-16 ENCOUNTER — APPOINTMENT (OUTPATIENT)
Dept: ADMISSIONS | Facility: MEDICAL CENTER | Age: 31
End: 2021-02-16
Payer: MEDICARE

## 2021-02-17 ENCOUNTER — APPOINTMENT (OUTPATIENT)
Dept: BEHAVIORAL HEALTH | Facility: CLINIC | Age: 31
End: 2021-02-17
Payer: MEDICARE

## 2021-02-26 ENCOUNTER — OFFICE VISIT (OUTPATIENT)
Dept: URGENT CARE | Facility: PHYSICIAN GROUP | Age: 31
End: 2021-02-26
Payer: MEDICARE

## 2021-02-26 ENCOUNTER — HOSPITAL ENCOUNTER (OUTPATIENT)
Dept: RADIOLOGY | Facility: MEDICAL CENTER | Age: 31
End: 2021-02-26
Attending: EMERGENCY MEDICINE
Payer: MEDICARE

## 2021-02-26 VITALS
DIASTOLIC BLOOD PRESSURE: 80 MMHG | WEIGHT: 234 LBS | RESPIRATION RATE: 20 BRPM | BODY MASS INDEX: 38.99 KG/M2 | TEMPERATURE: 96.3 F | HEART RATE: 111 BPM | OXYGEN SATURATION: 96 % | HEIGHT: 65 IN | SYSTOLIC BLOOD PRESSURE: 100 MMHG

## 2021-02-26 DIAGNOSIS — R68.84 JAW PAIN, NON-TMJ: ICD-10-CM

## 2021-02-26 DIAGNOSIS — R22.1 LOCALIZED SWELLING, MASS OR LUMP OF NECK: ICD-10-CM

## 2021-02-26 DIAGNOSIS — J02.9 SORE THROAT: ICD-10-CM

## 2021-02-26 LAB
INT CON NEG: NEGATIVE
INT CON POS: POSITIVE
S PYO AG THROAT QL: NEGATIVE

## 2021-02-26 PROCEDURE — 99214 OFFICE O/P EST MOD 30 MIN: CPT | Performed by: EMERGENCY MEDICINE

## 2021-02-26 PROCEDURE — 87880 STREP A ASSAY W/OPTIC: CPT | Performed by: EMERGENCY MEDICINE

## 2021-02-26 PROCEDURE — 70490 CT SOFT TISSUE NECK W/O DYE: CPT | Mod: ME

## 2021-02-26 ASSESSMENT — ENCOUNTER SYMPTOMS
FACIAL SWELLING: 1
SHORTNESS OF BREATH: 0
FEVER: 0
SWOLLEN GLANDS: 1
SORE THROAT: 1

## 2021-02-26 ASSESSMENT — FIBROSIS 4 INDEX: FIB4 SCORE: 1.02

## 2021-02-26 NOTE — PROGRESS NOTES
"Subjective:      Kristin Balderrama is a 30 y.o. female who presents with Pharyngitis (Mom reports right facial swelling. Onset 4 days. )            Facial Swelling  This is a new problem. Episode onset: 4 days. The problem occurs daily. The problem has been waxing and waning. Associated symptoms include a sore throat and swollen glands. Pertinent negatives include no chest pain, fever or rash. The symptoms are aggravated by swallowing. Treatments tried: Keflex. The treatment provided no relief.   No known trauma.  Known history of TMJ issues.  Dentist recently evaluated; x-rays negative, started Keflex.  Patient notes right jaw pain with submandibular swelling.  Now notes pain with swallowing.  On prednisone for optic neuritis. History augmented by mother.    Review of Systems   Constitutional: Negative for fever.   HENT: Positive for facial swelling and sore throat.    Respiratory: Negative for shortness of breath.    Cardiovascular: Negative for chest pain.   Skin: Negative for rash.     Past Medical History:   Diagnosis Date   • Abdominal pain    • Anginal syndrome     random chest pain especially with tachycardia   • Apnea, sleep    • Arrhythmia     \"sinus tachycardia\", cariologist, Dr. Kumar; ablation 2/2016   • Arthritis     osteo   • ASTHMA     when around smoke   • Atrial fibrillation (HCC)    • Back pain    • Borderline personality disorder (HCC)    • Breath shortness     with tachycardia   • Bronchitis    • Cardiac arrhythmia    • Chickenpox    • Chronic UTI 9/18/2010   • Cough    • Daytime sleepiness    • Depression    • Diabetes (HCC)    • Diarrhea    • Disorder of thyroid    • Fall    • Fatigue    • Frequent headaches    • Gasping for breath    • Gynecological disorder     PCOS   • Headache(784.0)    • Heart burn    • History of falling    • Indigestion    • Migraine    • 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HCC)    • 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HCC)    • Nausea    • Obesity    • Other fatigue 6/29/2020   • " "Pain 08-15-12    back, 7/10   • Painful joint    • PCOS (polycystic ovarian syndrome)    • Pneumonia 2012   • Psychosis (HCC)    • Ringing in ears    • Scoliosis    • Shortness of breath    • Sinus tachycardia 10/31/2013   • Sleep apnea     CPAP \"pulmonary doctor took me off mid year 2016\"   • Snoring    • Tonsillitis    • 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HCC)    • Tuberculosis     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   • Urinary bladder disorder     Suprapubic cath   • Urinary incontinence    • Weakness    • Wears glasses      Past Surgical History:   Procedure Laterality Date   • MUSCLE BIOPSY Right 1/26/2017    Procedure: MUSCLE BIOPSY - THIGH;  Surgeon: Isidro Vigil M.D.;  Location: SURGERY [facility];  Service:    • GASTROSCOPY WITH BALLOON DILATATION N/A 1/4/2017    Procedure: GASTROSCOPY WITH DILATATION;  Surgeon: Torres Vargas M.D.;  Location: [facility];  Service:    • BOWEL STIMULATOR INSERTION  7/13/2016    Procedure: BOWEL STIMULATOR INSERTION FOR PERMANENT INTERSTIM SACRAL IMPLANT;  Surgeon: Joe Noyola M.D.;  Location: SURGERY [facility];  Service:    • RECOVERY  1/27/2016    Procedure: CATH LAB EP STUDY WITH SINUS NODE MODIFICATION ABHINAV;  Surgeon: Recoveryonly Surgery;  Location: SURGERY PRE-POST PROC UNIT [facility];  Service:    • OTHER CARDIAC SURGERY  1/2016    cardiac ablation   • NEURO DEST FACET L/S W/IG SNGL  4/21/2015    Performed by Reza Tabor at [facility]   • LUMBAR FUSION ANTERIOR  8/21/2012    Performed by SUSIE SAWANT at [facility] ORS   • ALYSSA BY LAPAROSCOPY  8/29/2010    Performed by SHAYY JOHNS at [facility] ORS   • LAMINOTOMY     • OTHER ABDOMINAL SURGERY     • TONSILLECTOMY      tonsillectomy      Allergy:  Depakote [divalproex sodium], Amitriptyline, Aripiprazole [abilify], Clindamycin, Flagyl [metronidazole hcl], Flomax [tamsulosin hydrochloride], Levaquin, Metformin, Tape, Vancomycin, Wound dressing adhesive, " Ampicillin, Ciprofloxacin, Keflex, Levofloxacin, Metronidazole, Penicillins, Sulfa drugs, Tamsulosin, and Valproic acid     Current Outpatient Medications:   •  mycophenolate, 1,000 mg, Oral, BID, Taking  •  nitrofurantoin, 100 mg, Oral, BID, Taking  •  pregabalin, 300 mg, Oral, BID, Taking  •  baclofen, 10 mg, Oral, QHS, Taking  •  Trulicity, 0.5 mL, Subcutaneous, Q7 DAYS (Patient taking differently: 0.5 mL, Subcutaneous, EVERY 7 DAYS, On Fridays @ 2000), Taking  •  esomeprazole magnesium, 40 mg, Oral, QAM AC, Taking  •  busPIRone, 10 mg, Oral, TID, Taking  •  levothyroxine, 100 mcg, Oral, AM ES, Taking  •  Melatonin, 10 mg, Oral, QHS, Taking  •  fluoxetine, 40 mg, Oral, DAILY, Taking  •  OXcarbazepine, 300 mg, Oral, BID, Taking  •  ziprasidone, Take 1 tablet by mouth twice a day, Taking  •  albuterol, 2 Puff, Inhalation, Q6HRS PRN, PRN  •  Myrbetriq, 50 mg, Oral, DAILY, Taking  •  acetaminophen, 1,000 mg, Oral, Once PRN, PRN  •  sodium bicarbonate, 650 mg, Oral, BID, Taking  •  topiramate, 50 mg, Oral, BID, Taking  •  ondansetron, 4 mg, Oral, Q6HRS PRN, Taking  •  ipratropium-albuterol, 3 mL, Nebulization, Q4HRS PRN, PRN  •  calcium carbonate, 1,500 mg, Oral, DAILY (Patient taking differently: 1,500 mg, Oral, 1 TIME DAILY PRN), Taking  •  vitamin D (Ergocalciferol), 50,000 Units, Oral, Q7 DAYS, Taking  •  oxybutynin, 5 mg, Oral, TID, Taking  •  Corlanor, 7.5 mg, Oral, BID WITH MEALS, Taking  •  potassium chloride, 40 mEq, Oral, BID (Patient not taking: Reported on 2/26/2021), Not Taking  •  traZODone, 100 mg, Oral, QHS (Patient not taking: Reported on 2/26/2021), Not Taking  •  acetaZOLAMIDE SR, 500 mg, Oral, BID, Not Taking  •  aspirin EC, 81 mg, Oral, DAILY, Not Taking   family history includes Genitourinary () Problems in her sister; Heart Disease in her maternal grandmother and mother; Hypertension in her maternal grandmother, maternal uncle, and mother; No Known Problems in her sister; Other in her mother  "and sister; Sleep Apnea in her mother.   Social History     Tobacco Use   • Smoking status: Never Smoker   • Smokeless tobacco: Never Used   Substance Use Topics   • Alcohol use: No     Alcohol/week: 0.0 oz   • Drug use: Not Currently     Frequency: 7.0 times per week     Types: Marijuana         Objective:     /80 (BP Location: Left arm, Patient Position: Sitting, BP Cuff Size: Large adult)   Pulse (!) 111   Temp (!) 35.7 °C (96.3 °F) (Temporal)   Resp 20   Ht 1.651 m (5' 5\")   Wt 106 kg (234 lb)   SpO2 96%   BMI 38.94 kg/m²      Physical Exam  Constitutional:       General: She is awake.      Appearance: She is obese. She is not ill-appearing or toxic-appearing.   HENT:      Head: Normocephalic and atraumatic.      Jaw: There is normal jaw occlusion. Trismus, tenderness and pain on movement present. No swelling.      Salivary Glands: Right salivary gland is not diffusely enlarged or tender.      Right Ear: Tympanic membrane, ear canal and external ear normal.      Mouth/Throat:      Lips: Pink.      Mouth: Mucous membranes are dry.      Dentition: Normal dentition. No gum lesions.      Pharynx: No oropharyngeal exudate, posterior oropharyngeal erythema or uvula swelling.   Neck:      Thyroid: No thyromegaly.      Vascular: No JVD.      Trachea: Trachea and phonation normal.   Pulmonary:      Effort: Pulmonary effort is normal.   Musculoskeletal:      Cervical back: Neck supple. No rigidity. No muscular tenderness.   Lymphadenopathy:      Cervical: No cervical adenopathy.      Right cervical: No posterior cervical adenopathy.  Skin:     General: Skin is warm and dry.   Neurological:      Mental Status: She is alert.   Psychiatric:         Behavior: Behavior is cooperative.                 Assessment/Plan:        1. Jaw pain, non-TMJ  Advised continue Keflex per dentist; F/U dentist.  Advised ED if worsening pain, swelling  2. Localized swelling, mass or lump of neck  - CT-SOFT TISSUE NECK W/O; " Interpretation per radiologist:   CT of the neck soft tissues without contrast within normal limits.  3. Sore throat  negatve- POCT Rapid Strep A

## 2021-03-02 ENCOUNTER — TELEPHONE (OUTPATIENT)
Dept: MEDICAL GROUP | Facility: MEDICAL CENTER | Age: 31
End: 2021-03-02

## 2021-03-03 NOTE — TELEPHONE ENCOUNTER
[facility] health RN called to discuss patient's inability to urinate. Patient's suprapubic catheter was recently removed due to recurrent infections.  She has been unable to urinate today.  [facility] health RN requesting verbal order to straight cath patient. Verbal order placed for straight cath.

## 2021-03-05 ENCOUNTER — TELEMEDICINE (OUTPATIENT)
Dept: MEDICAL GROUP | Facility: MEDICAL CENTER | Age: 31
End: 2021-03-05
Payer: MEDICARE

## 2021-03-05 VITALS — BODY MASS INDEX: 39.99 KG/M2 | HEIGHT: 65 IN | WEIGHT: 240 LBS

## 2021-03-05 DIAGNOSIS — E11.69 DIABETES MELLITUS TYPE 2 IN OBESE: ICD-10-CM

## 2021-03-05 DIAGNOSIS — T83.010A SUPRAPUBIC CATHETER DYSFUNCTION, INITIAL ENCOUNTER (HCC): ICD-10-CM

## 2021-03-05 DIAGNOSIS — F25.1 SCHIZOAFFECTIVE DISORDER, DEPRESSIVE TYPE (HCC): ICD-10-CM

## 2021-03-05 DIAGNOSIS — G37.3 TRANSVERSE MYELITIS (HCC): ICD-10-CM

## 2021-03-05 DIAGNOSIS — E03.8 HYPOTHYROIDISM DUE TO HASHIMOTO'S THYROIDITIS: Chronic | ICD-10-CM

## 2021-03-05 DIAGNOSIS — G93.2 INTRACRANIAL HYPERTENSION: ICD-10-CM

## 2021-03-05 DIAGNOSIS — E06.3 HYPOTHYROIDISM DUE TO HASHIMOTO'S THYROIDITIS: Chronic | ICD-10-CM

## 2021-03-05 DIAGNOSIS — R15.9 FULL INCONTINENCE OF FECES: ICD-10-CM

## 2021-03-05 DIAGNOSIS — H46.9 OPTIC NEURITIS: ICD-10-CM

## 2021-03-05 DIAGNOSIS — J45.20 MILD INTERMITTENT ASTHMA WITHOUT COMPLICATION: ICD-10-CM

## 2021-03-05 DIAGNOSIS — E66.9 DIABETES MELLITUS TYPE 2 IN OBESE: ICD-10-CM

## 2021-03-05 PROBLEM — E83.51 HYPOCALCEMIA: Status: RESOLVED | Noted: 2019-11-30 | Resolved: 2021-03-05

## 2021-03-05 PROBLEM — Z93.59 PRESENCE OF SUPRAPUBIC CATHETER (HCC): Status: RESOLVED | Noted: 2017-02-16 | Resolved: 2021-03-05

## 2021-03-05 PROBLEM — R19.7 DIARRHEA OF PRESUMED INFECTIOUS ORIGIN: Status: RESOLVED | Noted: 2020-09-01 | Resolved: 2021-03-05

## 2021-03-05 PROCEDURE — 99214 OFFICE O/P EST MOD 30 MIN: CPT | Mod: 95,CR | Performed by: FAMILY MEDICINE

## 2021-03-05 RX ORDER — POTASSIUM CHLORIDE 20 MEQ/1
40 TABLET, EXTENDED RELEASE ORAL 2 TIMES DAILY
COMMUNITY
Start: 2021-02-08 | End: 2021-06-04

## 2021-03-05 RX ORDER — ASPIRIN 81 MG/1
81 TABLET ORAL EVERY MORNING
COMMUNITY
End: 2021-09-24

## 2021-03-05 RX ORDER — DIPHENHYDRAMINE HCL 25 MG
50 CAPSULE ORAL 4 TIMES DAILY PRN
COMMUNITY
End: 2021-04-15

## 2021-03-05 RX ORDER — PENICILLIN V POTASSIUM 500 MG/1
500 TABLET ORAL 4 TIMES DAILY
COMMUNITY
Start: 2021-03-01 | End: 2021-04-08

## 2021-03-05 RX ORDER — DULAGLUTIDE 1.5 MG/.5ML
0.5 INJECTION, SOLUTION SUBCUTANEOUS
Qty: 6 ML | Refills: 3 | Status: SHIPPED | OUTPATIENT
Start: 2021-03-05 | End: 2021-09-24

## 2021-03-05 ASSESSMENT — FIBROSIS 4 INDEX: FIB4 SCORE: 1.02

## 2021-03-05 NOTE — ASSESSMENT & PLAN NOTE
The patient reports she was diagnosed with schizophrenia many years ago along with several other conditions including depression, anxiety, disassociative identity disorder. She is established with Dr. La Lutz as well as Brie Baxter QUYEN.  She is on geodon 40mg BID, Prozac 40 mg daily, BuSpar 10 mg twice daily as needed, and trazodone 50 mg for sleep. Patient is also on topamax 50mg BID and trileptal 300mg BID which were initially started for seizure ppx. It is unclear if patient had seizures per patient history.

## 2021-03-05 NOTE — ASSESSMENT & PLAN NOTE
"Patient has long history of urinary and bowel incontinence, secondary to transverse myelitis per patient. Patient had Interstim placed by Dr. Noyola 2016; unfortunately patient reports device broke about a year ago.  The patient has had a suprapubic cath which has been complicated by recurrent infections.    Suprapubic cath removed 6-8 weeks ago by a \"nurse friend.\" She reports she was able to urinate independently until three days ago; she was unable to urinate, jara placed at [facility] ED.   Appointment 7:45AM 3/9/21 with [facility]. Patient reports suprapubic cath insertion site is healing well, no erythema or drainage per patient report.    Patient is on oxybutynin 5mg TID and mirabegron ER 50mg daily.  "

## 2021-03-05 NOTE — ASSESSMENT & PLAN NOTE
The patient follows with Dr. Yousif, recently hospitalized at [place], tx with high dose steroids. Appointment with Dr. Yousif last week, cellcept decreased from 1,000mg to 500mg BID, tapering prednisone.

## 2021-03-05 NOTE — LETTER
[facility]  Sue Preston M.D.  [address]  [place] [address]  Fax: [phone]   Authorization for Release/Disclosure of   Protected Health Information   Name: HARLEY DE LA CRUZ : 1990 SSN: [id number]   Address: [address] [place] [address] Phone:    [phone] (home)    I authorize the entity listed below to release/disclose the PHI below to:   [facility]/Sue Preston M.D. and Sue Preston M.D.   Provider or Entity Name:[facility]     Address   City, [facility], [facility]   Phone:[phone]      Fax:[phone]     Reason for request: continuity of care   Information to be released:    [  ] LAST COLONOSCOPY,  including any PATH REPORT and follow-up  [  ] LAST FIT/COLOGUARD RESULT [  ] LAST DEXA  [  ] LAST MAMMOGRAM  [  ] LAST PAP  [  ] LAST LABS [  ] RETINA EXAM REPORT  [  ] IMMUNIZATION RECORDS  [ xxx ] Release all info      [  ] Check here and initial the line next to each item to release ALL health information INCLUDING  _____ Care and treatment for drug and / or alcohol abuse  _____ HIV testing, infection status, or AIDS  _____ Genetic Testing    DATES OF SERVICE OR TIME PERIOD TO BE DISCLOSED: _____________  I understand and acknowledge that:  * This Authorization may be revoked at any time by you in writing, except if your health information has already been used or disclosed.  * Your health information that will be used or disclosed as a result of you signing this authorization could be re-disclosed by the recipient. If this occurs, your re-disclosed health information may no longer be protected by State or Federal laws.  * You may refuse to sign this Authorization. Your refusal will not affect your ability to obtain treatment.  * This Authorization becomes effective upon signing and will  on (date) __________.      If no date is indicated, this Authorization will  one (1) year from the signature date.    Name: Harley Armstrong  [place]    Signature:Continuation of Care   Date:     3/5/2021       PLEASE FAX REQUESTED RECORDS BACK TO: [phone]

## 2021-03-05 NOTE — ASSESSMENT & PLAN NOTE
"Per record review patient dx in 2018, HgbA1C 6.8 at time of dx. When patient established with me she was on lantus 15U at night and trulicity 1.5mg once weekly. She reports history of lactic acidosis with metformin. I took her off lantus, increased trulicity to 3.0 q 7 days however HgbA1C was 4.6 1/20/21 thus recommended patient decrease trulicity to 1.5mg q 7 days. Unfortunately pt has not received new trulicity dose.    She reports \"I eat what my family eats, whatever is tossed my direction I'll eat it.\" She reports eating a lot of pasta and burritos. She drinks ginger ale and cherry seven-up. She has cut down to one soda per day and reports she cannot completely cut out soda.  "

## 2021-03-05 NOTE — ASSESSMENT & PLAN NOTE
Patient reports asthma is mild, rarely uses albuterol inhaler. No shortness of breath, wheezing or chronic cough.

## 2021-03-05 NOTE — LETTER
[facility]  Sue Preston M.D.  [address]  [place] [address]  Fax: [phone]   Authorization for Release/Disclosure of   Protected Health Information   Name: KRISTIN DE LA CRUZ : 1990 SSN: [id number]   Address: [address] [place] [address] Phone:    [phone] (home)    I authorize the entity listed below to release/disclose the PHI below to:   [facility]/Sue Preston M.D. and Sue Preston M.D.   Provider or Entity Name:[facility] [place]     Address   City, State, [facility]   Phone:[phone]      Fax:[phone]     Reason for request: continuity of care   Information to be released:    [  ] LAST COLONOSCOPY,  including any PATH REPORT and follow-up  [  ] LAST FIT/COLOGUARD RESULT [  ] LAST DEXA  [  ] LAST MAMMOGRAM  [  ] LAST PAP  [  ] LAST LABS [  ] RETINA EXAM REPORT  [  ] IMMUNIZATION RECORDS  [xxx  ] Release all info      [  ] Check here and initial the line next to each item to release ALL health information INCLUDING  _____ Care and treatment for drug and / or alcohol abuse  _____ HIV testing, infection status, or AIDS  _____ Genetic Testing    DATES OF SERVICE OR TIME PERIOD TO BE DISCLOSED: _____________  I understand and acknowledge that:  * This Authorization may be revoked at any time by you in writing, except if your health information has already been used or disclosed.  * Your health information that will be used or disclosed as a result of you signing this authorization could be re-disclosed by the recipient. If this occurs, your re-disclosed health information may no longer be protected by State or Federal laws.  * You may refuse to sign this Authorization. Your refusal will not affect your ability to obtain treatment.  * This Authorization becomes effective upon signing and will  on (date) __________.      If no date is indicated, this Authorization will  one (1) year from the signature date.    Name: Kristin Armstrong  [place]    Signature:Continuation of Care   Date:     3/5/2021       PLEASE FAX REQUESTED RECORDS BACK TO: [phone]

## 2021-03-05 NOTE — PROGRESS NOTES
"Telemedicine Visit: Established Patient     This evaluation was conducted via Zoom using secure and encrypted videoconferencing technology. The patient was in a private location in the [facility].    The patient's identity was confirmed and verbal consent was obtained for this virtual visit.    Subjective:   CC: follow up chronic medical conditions  Kristin Balderrama is a 30 y.o. female presenting for evaluation and management of:    Suprapubic catheter dysfunction (HCC)  Patient has long history of urinary and bowel incontinence, secondary to transverse myelitis per patient. Patient had Interstim placed by Dr. Noyola 2016; unfortunately patient reports device broke about a year ago.  The patient has had a suprapubic cath which has been complicated by recurrent infections.    Suprapubic cath removed 6-8 weeks ago by a \"nurse friend.\" She reports she was able to urinate independently until three days ago; she was unable to urinate, jara placed at [facility].   Appointment 7:45AM 3/9/21 with [facility]. Patient reports suprapubic cath insertion site is healing well, no erythema or drainage per patient report.    Patient is on oxybutynin 5mg TID and mirabegron ER 50mg daily.    Optic neuritis  The patient follows with Dr. Yousif, recently hospitalized at [place], tx with high dose steroids. Appointment with Dr. Yousif last week, cellcept decreased from 1,000mg to 500mg BID, tapering prednisone.     Full incontinence of feces  She has had fecal incontinence for the past 10 years or more, reportedly secondary to transverse myelitis.  Interstim placed by Dr. Noyola in 2016, broke about a year ago, scheduled for replacement 3/10/2021.  [facility] is established [facility]. The patient was scheduled for an EGD and colonoscopy, had to reschedule. Patient reports intermittent diarrhea although she does occasionally have formed BMs. No melena or hematochezia.    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HCC)  Patient " "reports history of transverse myelitis diagnosed many years ago; she has not seen a neurologist for over a year. Patient referred 12/16/2021, referral authorized.  Provided scheduling phone number for [place] neurolog[facility] to patient today.    Schizoaffective disorder, depressive type (HCC)  The patient reports she was diagnosed with schizophrenia many years ago along with several other conditions including depression, anxiety, disassociative identity disorder. She is established with Dr. La Lutz as well as [facility].  She is on geodon 40mg BID, Prozac 40 mg daily, BuSpar 10 mg twice daily as needed, and trazodone 50 mg for sleep. Patient is also on topamax 50mg BID and trileptal 300mg BID which were initially started for seizure ppx. It is unclear if patient had seizures per patient history.      Mild intermittent asthma without complication  Patient reports asthma is mild, rarely uses albuterol inhaler. No shortness of breath, wheezing or chronic cough.    Intracranial hypertension  The patient reports compliance with diamox 500mg BID. The patient reports Dr. Yousif is following this issue.    Hypothyroidism  Patient reports she is doing well on levothyroxine 100 mcg q. morning.  TSH WNL 1/20/21.    Diabetes mellitus type 2 in obese (HCC)  Per record review patient dx in 2018, HgbA1C 6.8 at time of dx. When patient established with me she was on lantus 15U at night and trulicity 1.5mg once weekly. She reports history of lactic acidosis with metformin. I took her off lantus, increased trulicity to 3.0 q 7 days however HgbA1C was 4.6 1/20/21 thus recommended patient decrease trulicity to 1.5mg q 7 days. Unfortunately pt has not received new trulicity dose.    She reports \"I eat what my family eats, whatever is tossed my direction I'll eat it.\" She reports eating a lot of pasta and burritos. She drinks ginger ale and cherry seven-up. She has cut down to one soda per day and reports she cannot completely " "cut out soda.      ROS   Denies any recent fevers or chills. No nausea or vomiting. No chest pains or shortness of breath.     Allergies   Allergen Reactions   • Depakote [Divalproex Sodium] Unspecified     Muscle spasms/muscle pain and weakness     • Amitriptyline Unspecified     Headaches     • Aripiprazole [Abilify] Unspecified     Headaches/muscle twitching     • Clindamycin Nausea     Even with food     • Flagyl [Metronidazole Hcl] Unspecified     \"eye problems\"     • Flomax [Tamsulosin Hydrochloride] Swelling   • Levaquin Unspecified     Severe muscle cramps in legs  RXN=unknown   • Metformin Unspecified     Increased lactic acid      • Tape Rash     Tears skin off  coban with Tegaderm tape ok intermittently  RXN=ongoing   • Vancomycin Itching     Pt becomes flushed in face and chest.   RXN=7/10/16   • Wound Dressing Adhesive Hives     By pt report   • Ampicillin Rash     Pt reports that she received a rash    • Ciprofloxacin    • Keflex Rash     Pt states she gets a rash with this medication  Tolerates ceftriaxone   • Levofloxacin Unspecified     Leg muscle cramps   • Metronidazole Rash     .   • Penicillins Hives   • Sulfa Drugs Myalgia     Muscle pain and weakness   • Tamsulosin Swelling   • Valproic Acid Rash     .       Current medicines (including changes today)  Current Outpatient Medications   Medication Sig Dispense Refill   • aspirin (ASA) 81 MG Chew Tab chewable tablet Chew 81 mg every day.     • penicillin v potassium (VEETID) 500 MG Tab      • diphenhydrAMINE HCl (BENADRYL ALLERGY PO) Take  by mouth.     • Dulaglutide (TRULICITY) 1.5 MG/0.5ML Solution Pen-injector Inject 0.5 mL under the skin every 7 days. 6 mL 3   • mycophenolate (CELLCEPT) 500 MG tablet Take 2 Tablets by mouth 2 times a day. (Patient taking differently: Take 500 mg by mouth 2 times a day.) 120 tablet 0   • pregabalin (LYRICA) 300 MG capsule Take 1 Cap by mouth 2 times a day for 60 days. 60 Cap 1   • baclofen (LIORESAL) 10 MG Tab " Take 1 Tab by mouth every bedtime. (Patient taking differently: Take 10 mg by mouth 3 times a day.) 30 Tab 1   • esomeprazole magnesium (NEXIUM) 40 MG Pack Take 40 mg by mouth every morning before breakfast.     • busPIRone (BUSPAR) 10 MG Tab tablet Take 10 mg by mouth 3 times a day.     • levothyroxine (SYNTHROID) 100 MCG Tab Take 100 mcg by mouth Every morning on an empty stomach.     • Melatonin 10 MG Tab Take 10 mg by mouth every bedtime.     • fluoxetine (PROZAC) 40 MG capsule Take 1 Cap by mouth every day. 90 Cap 0   • OXcarbazepine (TRILEPTAL) 300 MG Tab Take 1 Tab by mouth 2 times a day. 180 Tab 0   • ziprasidone (GEODON) 40 MG Cap Take 1 tablet by mouth twice a day (Patient taking differently: Take 40 mg by mouth 2 Times a Day. Take 1 tablet by mouth twice a day) 180 Cap 0   • albuterol 108 (90 Base) MCG/ACT Aero Soln inhalation aerosol Inhale 2 Puffs every 6 hours as needed for Shortness of Breath. 8.5 g 6   • Mirabegron ER (MYRBETRIQ) 50 MG TABLET SR 24 HR Take 50 mg by mouth every day. 90 Tab 3   • acetaminophen (TYLENOL) 500 MG Tab Take 1,000 mg by mouth one time as needed for Moderate Pain.     • sodium bicarbonate 325 MG Tab Take 650 mg by mouth 2 times a day.     • topiramate (TOPAMAX) 50 MG tablet Take 50 mg by mouth 2 times a day.     • ondansetron (ZOFRAN ODT) 4 MG TABLET DISPERSIBLE Take 1 Tab by mouth every 6 hours as needed for Nausea. 10 Tab 0   • ipratropium-albuterol (DUONEB) 0.5-2.5 (3) MG/3ML nebulizer solution Take 3 mL by nebulization every four hours as needed for Shortness of Breath. Nebulizer      • calcium carbonate (TUMS EX) 750 MG chewable tablet Take 2 Tabs by mouth every day. (Patient taking differently: Chew 1,500 mg 1 time a day as needed.) 60 Tab 0   • vitamin D, Ergocalciferol, (DRISDOL) 1.25 MG (35258 UT) Cap capsule Take 50,000 Units by mouth every 7 days.     • oxybutynin (DITROPAN) 5 MG Tab Take 1 Tab by mouth 3 times a day. 90 Tab 0   • ivabradine (CORLANOR) 7.5 MG Tab  tablet Take 7.5 mg by mouth 2 times a day, with meals.     • potassium chloride SA (KDUR) 20 MEQ Tab CR        No current facility-administered medications for this visit.       Patient Active Problem List    Diagnosis Date Noted   • Urinary tract infection associated with indwelling urethral catheter ([facility]) 10/11/2020     Priority: High   • Intracranial hypertension 02/27/2020     Priority: High   • Optic neuritis 05/27/2019     Priority: High   • Polypharmacy 06/27/2016     Priority: High   • Diabetes mellitus type 2 in obese ([facility]) 04/13/2017     Priority: Medium   • Immunocompromised ([facility]) 11/06/2019     Priority: Low   • Hypothyroidism 08/04/2017     Priority: Low   • Depression 10/28/2016     Priority: Low   • Schizophrenia ([facility]) 10/27/2016     Priority: Low   • Morbidly obese ([facility]) 03/07/2016     Priority: Low   • GERD (gastroesophageal reflux disease) 03/07/2016     Priority: Low   • Peripheral neuropathy and chornic pain syndrome ([facility]) 03/06/2016     Priority: Low   • Fatty liver disease, nonalcoholic 01/19/2015     Priority: Low   • Anxiety 12/16/2014     Priority: Low   • Borderline personality disorder in adult ([facility]) 09/18/2010     Priority: Low   • Suprapubic catheter dysfunction ([facility]) 02/05/2021   • Seizure ([facility]) 09/04/2020   • Dry eyes 08/18/2020   • 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[facility]) 08/15/2020   • Schizoaffective disorder, depressive type ([facility]) 03/02/2020   • PTSD (post-traumatic stress disorder) 03/02/2020   • Mixed stress and urge urinary incontinence 12/27/2019   • Mild intermittent asthma without complication 12/16/2019   • SVT (supraventricular tachycardia) ([facility]) 04/10/2019   • Functional diarrhea 01/05/2018   • Full incontinence of feces 07/13/2016   • Vitamin D deficiency 05/21/2016   • Scoliosis 03/07/2016   • Dissociative identity disorder ([facility]) 04/02/2011       Family History   Problem Relation Age of Onset   • Hypertension Mother    • Sleep Apnea Mother    • Heart Disease Mother    • Other  "Mother         hypothryod   • Hypertension Maternal Uncle    • Heart Disease Maternal Grandmother    • Hypertension Maternal Grandmother    • No Known Problems Sister    • Other Sister         Narcolepsy;fibromyalsia;bone;nerve   • Genitourinary () Problems Sister         endometriosis       She  has a past medical history of Abdominal pain, Anginal syndrome, Apnea, sleep, Arrhythmia, Arthritis, ASTHMA, Atrial fibrillation ([facility]), Back pain, Borderline personality disorder ([facility]), Breath shortness, Bronchitis, Cardiac arrhythmia, Chickenpox, Chronic UTI (9/18/2010), Cough, Daytime sleepiness, Depression, Diabetes ([facility]), Diarrhea, Disorder of thyroid, Fall, Fatigue, Frequent headaches, Gasping for breath, Gynecological disorder, Headache(784.0), Heart burn, History of falling, Indigestion, Migraine, 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[facility]), 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 She also has no past medical history of COPD (chronic obstructive pulmonary disease) ([facility]).  She  has a past surgical history that includes neuro dest facet l/s w/ig sngl (4/21/2015); recovery (1/27/2016); delmar by laparoscopy (8/29/2010); lumbar fusion anterior (8/21/2012); other cardiac surgery (1/2016); tonsillectomy; bowel stimulator insertion (7/13/2016); gastroscopy with balloon dilatation (N/A, 1/4/2017); muscle biopsy (Right, 1/26/2017); other abdominal surgery; and laminotomy.       Objective:   Ht 1.651 m (5' 5\") Comment: p. reported  Wt 109 kg (240 lb) Comment: p. reported  BMI 39.94 kg/m²     Physical Exam:  Constitutional: Alert, no distress, well-groomed.  Skin: No " "rashes in visible areas.  Eye: Round. Conjunctiva clear, lids normal. No icterus.   ENMT: Lips pink without lesions, good dentition, moist mucous membranes. Phonation normal.  Respiratory: Unlabored respiratory effort, no cough or audible wheeze  Psych: Alert and oriented x3, normal affect and mood.       Assessment and Plan:   The following treatment plan was discussed:     1. Suprapubic catheter dysfunction, initial encounter ([facility])  Patient reports \"nurse friend\" removed suprapubic cath 6 weeks ago, jara now in place.  - Follow up [facility] 3/9/21    2. Optic neuritis  Patient reports recent flare and hospitalization and [facility]. Patient follows with Dr. Yousif.  - Continue cellcept 500mg BID  - Continue prednisone taper  - Requesting records from [facility] and Dr. Yousif    3. Full incontinence of feces  S/p interstim 2016 which has failed, patient scheduled for replacement with Dr. Noyola 3/10/21.    4. Diabetes mellitus type 2 in obese ([facility])  Patient currently on trulicity 3mg q 7 days, most recent HgbA1C 4.6. Recommended patient decrease trulicity to 1.5mg q 7 days. Discussed importance of dietary modification - patient is doing her best.  - Dulaglutide (TRULICITY) 1.5 MG/0.5ML Solution Pen-injector; Inject 0.5 mL under the skin every 7 days.  Dispense: 6 mL; Refill: 3  - Hemoglobin A1C     5. 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[facility])  Patient reports history of transverse myelitis, she has not seen neurology in over a year, referral placed three months ago, scheduling phone number provided to patient today.    6. Schizoaffective disorder, depressive type ([facility])  Patient following with psychiatry as well as participating in therapy for concomitant depression, anxiety, and dissociative identity disorder.   - Patient to continue current regimen, see \[facility] [place] [facility]\"" for details     7. Mild intermittent asthma without complication  Patient doing well on PRN albuterol, infrequent use. Patient to continue current regimen    8. Intracranial " hypertension  Patient follows with Dr. Yousif and is on diamox.  - Requesting records from Dr. Yousif    9. Hypothyroidism due to Hashimoto's thyroiditis  - Continue levothyroxine 100mcg q morning  - TSH with next labs      Follow-up: Return in about 1 month (around 4/5/2021).

## 2021-03-05 NOTE — ASSESSMENT & PLAN NOTE
She has had fecal incontinence for the past 10 years or more, reportedly secondary to transverse myelitis.  Interstim placed by Dr. Noyola in 2016, broke about a year ago, scheduled for replacement 3/10/2021.  She is established [facility]. The patient was scheduled for an EGD and colonoscopy, had to reschedule. Patient reports intermittent diarrhea although she does occasionally have formed BMs. No melena or hematochezia.

## 2021-03-05 NOTE — ASSESSMENT & PLAN NOTE
Patient reports history of transverse myelitis diagnosed many years ago; she has not seen a neurologist for over a year. Patient referred 12/16/2021, referral authorized.  Provided scheduling phone number for [place] to patient today.

## 2021-03-05 NOTE — LETTER
[facility]  Sue Preston M.D.  [address]  [place] [address]  Fax: [phone]   Authorization for Release/Disclosure of   Protected Health Information   Name: HARLEY DE LA CRUZ : 1990 SSN: [id number]   Address: [address] [place] [id number] Phone:    [phone] (home)    I authorize the entity listed below to release/disclose the PHI below to:   [facility]/Sue Preston M.D. and Sue Preston M.D.   Provider or Entity Name:[facility]   City, [facility], [facility]   Phone:[phone]      Fax:[phone]     Reason for request: continuity of care   Information to be released:    [  ] LAST COLONOSCOPY,  including any PATH REPORT and follow-up  [  ] LAST FIT/COLOGUARD RESULT [  ] LAST DEXA  [  ] LAST MAMMOGRAM  [  ] LAST PAP  [  ] LAST LABS [  ] RETINA EXAM REPORT  [  ] IMMUNIZATION RECORDS  [ xxx ] Release all info      [  ] Check here and initial the line next to each item to release ALL health information INCLUDING  _____ Care and treatment for drug and / or alcohol abuse  _____ HIV testing, infection status, or AIDS  _____ Genetic Testing    DATES OF SERVICE OR TIME PERIOD TO BE DISCLOSED: _____________  I understand and acknowledge that:  * This Authorization may be revoked at any time by you in writing, except if your health information has already been used or disclosed.  * Your health information that will be used or disclosed as a result of you signing this authorization could be re-disclosed by the recipient. If this occurs, your re-disclosed health information may no longer be protected by State or Federal laws.  * You may refuse to sign this Authorization. Your refusal will not affect your ability to obtain treatment.  * This Authorization becomes effective upon signing and will  on (date) __________.      If no date is indicated, this Authorization will  one (1) year from the signature date.    Name: Harley Armstrong  [place]    Signature:Continuation of Care   Date:     3/5/2021       PLEASE FAX REQUESTED RECORDS BACK TO: [phone]

## 2021-03-06 ENCOUNTER — APPOINTMENT (OUTPATIENT)
Dept: RADIOLOGY | Facility: MEDICAL CENTER | Age: 31
End: 2021-03-06
Attending: EMERGENCY MEDICINE
Payer: MEDICARE

## 2021-03-06 ENCOUNTER — APPOINTMENT (OUTPATIENT)
Dept: CARDIOLOGY | Facility: MEDICAL CENTER | Age: 31
End: 2021-03-06
Attending: EMERGENCY MEDICINE
Payer: MEDICARE

## 2021-03-06 ENCOUNTER — HOSPITAL ENCOUNTER (EMERGENCY)
Facility: MEDICAL CENTER | Age: 31
End: 2021-03-06
Attending: EMERGENCY MEDICINE
Payer: MEDICARE

## 2021-03-06 VITALS
OXYGEN SATURATION: 96 % | HEART RATE: 76 BPM | TEMPERATURE: 97.3 F | DIASTOLIC BLOOD PRESSURE: 64 MMHG | SYSTOLIC BLOOD PRESSURE: 107 MMHG | BODY MASS INDEX: 39.55 KG/M2 | WEIGHT: 237.66 LBS | RESPIRATION RATE: 18 BRPM

## 2021-03-06 DIAGNOSIS — G43.909 MIGRAINE WITHOUT STATUS MIGRAINOSUS, NOT INTRACTABLE, UNSPECIFIED MIGRAINE TYPE: ICD-10-CM

## 2021-03-06 LAB
ABO GROUP BLD: NORMAL
ALBUMIN SERPL BCP-MCNC: 4.1 G/DL (ref 3.2–4.9)
ALBUMIN/GLOB SERPL: 2 G/DL
ALP SERPL-CCNC: 63 U/L (ref 30–99)
ALT SERPL-CCNC: 52 U/L (ref 2–50)
ANION GAP SERPL CALC-SCNC: 11 MMOL/L (ref 7–16)
APTT PPP: 24.1 SEC (ref 24.7–36)
AST SERPL-CCNC: 14 U/L (ref 12–45)
BASOPHILS # BLD AUTO: 0.1 % (ref 0–1.8)
BASOPHILS # BLD: 0.01 K/UL (ref 0–0.12)
BILIRUB SERPL-MCNC: 0.2 MG/DL (ref 0.1–1.5)
BLD GP AB SCN SERPL QL: NORMAL
BUN SERPL-MCNC: 19 MG/DL (ref 8–22)
CALCIUM SERPL-MCNC: 9.4 MG/DL (ref 8.5–10.5)
CHLORIDE SERPL-SCNC: 108 MMOL/L (ref 96–112)
CO2 SERPL-SCNC: 17 MMOL/L (ref 20–33)
CREAT SERPL-MCNC: 0.87 MG/DL (ref 0.5–1.4)
CRP SERPL HS-MCNC: 0.19 MG/DL (ref 0–0.75)
EKG IMPRESSION: NORMAL
EOSINOPHIL # BLD AUTO: 0.07 K/UL (ref 0–0.51)
EOSINOPHIL NFR BLD: 0.8 % (ref 0–6.9)
ERYTHROCYTE [DISTWIDTH] IN BLOOD BY AUTOMATED COUNT: 45.3 FL (ref 35.9–50)
ERYTHROCYTE [SEDIMENTATION RATE] IN BLOOD BY WESTERGREN METHOD: <1 MM/HOUR (ref 0–20)
GLOBULIN SER CALC-MCNC: 2.1 G/DL (ref 1.9–3.5)
GLUCOSE BLD-MCNC: 110 MG/DL (ref 65–99)
GLUCOSE SERPL-MCNC: 118 MG/DL (ref 65–99)
HCT VFR BLD AUTO: 42.1 % (ref 37–47)
HGB BLD-MCNC: 13.4 G/DL (ref 12–16)
IMM GRANULOCYTES # BLD AUTO: 0.07 K/UL (ref 0–0.11)
IMM GRANULOCYTES NFR BLD AUTO: 0.8 % (ref 0–0.9)
INR PPP: 0.91 (ref 0.87–1.13)
LV EJECT FRACT  99904: 60
LYMPHOCYTES # BLD AUTO: 0.8 K/UL (ref 1–4.8)
LYMPHOCYTES NFR BLD: 9.3 % (ref 22–41)
MCH RBC QN AUTO: 29.5 PG (ref 27–33)
MCHC RBC AUTO-ENTMCNC: 31.8 G/DL (ref 33.6–35)
MCV RBC AUTO: 92.5 FL (ref 81.4–97.8)
MONOCYTES # BLD AUTO: 0.28 K/UL (ref 0–0.85)
MONOCYTES NFR BLD AUTO: 3.3 % (ref 0–13.4)
NEUTROPHILS # BLD AUTO: 7.37 K/UL (ref 2–7.15)
NEUTROPHILS NFR BLD: 85.7 % (ref 44–72)
NRBC # BLD AUTO: 0 K/UL
NRBC BLD-RTO: 0 /100 WBC
PLATELET # BLD AUTO: 154 K/UL (ref 164–446)
PMV BLD AUTO: 12.5 FL (ref 9–12.9)
POTASSIUM SERPL-SCNC: 4.9 MMOL/L (ref 3.6–5.5)
PROT SERPL-MCNC: 6.2 G/DL (ref 6–8.2)
PROTHROMBIN TIME: 12.6 SEC (ref 12–14.6)
RBC # BLD AUTO: 4.55 M/UL (ref 4.2–5.4)
RH BLD: NORMAL
SODIUM SERPL-SCNC: 136 MMOL/L (ref 135–145)
TROPONIN T SERPL-MCNC: <6 NG/L (ref 6–19)
WBC # BLD AUTO: 8.6 K/UL (ref 4.8–10.8)

## 2021-03-06 PROCEDURE — 93306 TTE W/DOPPLER COMPLETE: CPT | Mod: 26 | Performed by: INTERNAL MEDICINE

## 2021-03-06 PROCEDURE — 99285 EMERGENCY DEPT VISIT HI MDM: CPT

## 2021-03-06 PROCEDURE — 0042T CT-CEREBRAL PERFUSION ANALYSIS: CPT

## 2021-03-06 PROCEDURE — 93005 ELECTROCARDIOGRAM TRACING: CPT | Performed by: EMERGENCY MEDICINE

## 2021-03-06 PROCEDURE — 86850 RBC ANTIBODY SCREEN: CPT

## 2021-03-06 PROCEDURE — 82962 GLUCOSE BLOOD TEST: CPT

## 2021-03-06 PROCEDURE — 70498 CT ANGIOGRAPHY NECK: CPT | Mod: MG

## 2021-03-06 PROCEDURE — 86140 C-REACTIVE PROTEIN: CPT

## 2021-03-06 PROCEDURE — 96366 THER/PROPH/DIAG IV INF ADDON: CPT

## 2021-03-06 PROCEDURE — 86900 BLOOD TYPING SEROLOGIC ABO: CPT

## 2021-03-06 PROCEDURE — 85025 COMPLETE CBC W/AUTO DIFF WBC: CPT

## 2021-03-06 PROCEDURE — 85652 RBC SED RATE AUTOMATED: CPT

## 2021-03-06 PROCEDURE — 85730 THROMBOPLASTIN TIME PARTIAL: CPT

## 2021-03-06 PROCEDURE — 80053 COMPREHEN METABOLIC PANEL: CPT

## 2021-03-06 PROCEDURE — 84484 ASSAY OF TROPONIN QUANT: CPT

## 2021-03-06 PROCEDURE — A9270 NON-COVERED ITEM OR SERVICE: HCPCS

## 2021-03-06 PROCEDURE — 700111 HCHG RX REV CODE 636 W/ 250 OVERRIDE (IP): Performed by: PSYCHIATRY & NEUROLOGY

## 2021-03-06 PROCEDURE — 70496 CT ANGIOGRAPHY HEAD: CPT | Mod: MG

## 2021-03-06 PROCEDURE — 85610 PROTHROMBIN TIME: CPT

## 2021-03-06 PROCEDURE — 96365 THER/PROPH/DIAG IV INF INIT: CPT

## 2021-03-06 PROCEDURE — 700102 HCHG RX REV CODE 250 W/ 637 OVERRIDE(OP)

## 2021-03-06 PROCEDURE — 86901 BLOOD TYPING SEROLOGIC RH(D): CPT

## 2021-03-06 PROCEDURE — 71045 X-RAY EXAM CHEST 1 VIEW: CPT

## 2021-03-06 PROCEDURE — 700117 HCHG RX CONTRAST REV CODE 255: Performed by: EMERGENCY MEDICINE

## 2021-03-06 PROCEDURE — 70450 CT HEAD/BRAIN W/O DYE: CPT | Mod: MG

## 2021-03-06 PROCEDURE — 96375 TX/PRO/DX INJ NEW DRUG ADDON: CPT

## 2021-03-06 PROCEDURE — 93306 TTE W/DOPPLER COMPLETE: CPT

## 2021-03-06 RX ORDER — DIPHENHYDRAMINE HYDROCHLORIDE 50 MG/ML
25 INJECTION INTRAMUSCULAR; INTRAVENOUS ONCE
Status: COMPLETED | OUTPATIENT
Start: 2021-03-06 | End: 2021-03-06

## 2021-03-06 RX ORDER — ACETAMINOPHEN 325 MG/1
650 TABLET ORAL ONCE
Status: COMPLETED | OUTPATIENT
Start: 2021-03-06 | End: 2021-03-06

## 2021-03-06 RX ORDER — TRAZODONE HYDROCHLORIDE 100 MG/1
100 TABLET ORAL
COMMUNITY
End: 2021-03-11 | Stop reason: SDUPTHER

## 2021-03-06 RX ORDER — CEPHALEXIN 500 MG/1
500 CAPSULE ORAL 3 TIMES DAILY
Status: ON HOLD | COMMUNITY
End: 2021-03-10

## 2021-03-06 RX ORDER — METOCLOPRAMIDE HYDROCHLORIDE 5 MG/ML
10 INJECTION INTRAMUSCULAR; INTRAVENOUS ONCE
Status: COMPLETED | OUTPATIENT
Start: 2021-03-06 | End: 2021-03-06

## 2021-03-06 RX ORDER — PREDNISONE 10 MG/1
10-70 TABLET ORAL DAILY
COMMUNITY
End: 2021-04-15

## 2021-03-06 RX ORDER — MAGNESIUM SULFATE HEPTAHYDRATE 40 MG/ML
2 INJECTION, SOLUTION INTRAVENOUS ONCE
Status: COMPLETED | OUTPATIENT
Start: 2021-03-06 | End: 2021-03-06

## 2021-03-06 RX ORDER — DEXAMETHASONE SODIUM PHOSPHATE 4 MG/ML
10 INJECTION, SOLUTION INTRA-ARTICULAR; INTRALESIONAL; INTRAMUSCULAR; INTRAVENOUS; SOFT TISSUE ONCE
Status: COMPLETED | OUTPATIENT
Start: 2021-03-06 | End: 2021-03-06

## 2021-03-06 RX ADMIN — IOHEXOL 80 ML: 350 INJECTION, SOLUTION INTRAVENOUS at 13:03

## 2021-03-06 RX ADMIN — ACETAMINOPHEN 650 MG: 325 TABLET, FILM COATED ORAL at 17:22

## 2021-03-06 RX ADMIN — DIPHENHYDRAMINE HYDROCHLORIDE 25 MG: 50 INJECTION INTRAMUSCULAR; INTRAVENOUS at 13:58

## 2021-03-06 RX ADMIN — IOHEXOL 40 ML: 350 INJECTION, SOLUTION INTRAVENOUS at 13:02

## 2021-03-06 RX ADMIN — DEXAMETHASONE SODIUM PHOSPHATE 10 MG: 4 INJECTION, SOLUTION INTRA-ARTICULAR; INTRALESIONAL; INTRAMUSCULAR; INTRAVENOUS; SOFT TISSUE at 13:57

## 2021-03-06 RX ADMIN — METOCLOPRAMIDE 10 MG: 5 INJECTION, SOLUTION INTRAMUSCULAR; INTRAVENOUS at 13:58

## 2021-03-06 RX ADMIN — MAGNESIUM SULFATE 2 G: 2 INJECTION INTRAVENOUS at 13:57

## 2021-03-06 ASSESSMENT — FIBROSIS 4 INDEX: FIB4 SCORE: 1.02

## 2021-03-06 NOTE — PROGRESS NOTES
Brief Neurohospitalist Note    This patient is well known to the neurology service, both on the inpatient and outpatient divisions. The patient was sent by her ophthalmologist in the setting of severe headache.    Stat neuroimaging inclusive of vessels and perfusion are reassuring.     I have placed a migraine cocktail including magnesium, Reglan, Benadryl, and Decadron times one IV    Please page the Neurohospitalists s ervice and place a formal neurology consult if you have additional inquiry or a change in status    B. Mckenzie DÍAZ  Neurology

## 2021-03-06 NOTE — ED NOTES
Stroke Pre Alert Time: 1235  Vitals PTA:   FSBS PTA:   Deficits PTA:   Door Time: 1235   On set or Last Seen Normal: 1900 Yesterday 3/5  A&O on Arrival: X4  GCS:15  FSBS on Arrival: 110  On arrival deficits: left vision changes, left facial numbness and tingling, left arm drift.

## 2021-03-06 NOTE — ED NOTES
Med rec completed per Pt at bedside with medication list provided by Pt and phone call to Pt's pharmacy [facility] [address] () to verify directions and dispense date for Pt's prednisone taper. Medication list reviewed and returned.  Allergies reviewed with Pt. Pt is allergic to several antibiotics but states that she can take them if she takes 50 mg of Benadryl with each dose.  Pt states that she stopped taking her aspirin in preparation for an upcoming surgery and that she stopped her Trulicity because she did not need it.  Pt was on a 10 day course of Keflex 500 mg 1 capsule three times per day which Pt reports she FINISHED >1 week ago.  Pt is currently on a 7 day course of penicillin v potassium 500 mg 1 tablet four times per day started on 3/1/2021.  Pt is currently on a prednisone taper started 2/27/2021.

## 2021-03-06 NOTE — ED TRIAGE NOTES
Chief Complaint   Patient presents with   • Eye Pain     pain in left eye and intermittent loss of vision since 7pm last night, went to take a nap at 9am then woke up at 11am w/worsening symptoms   • Numbness     left facial numbness at 7pm worsen at 11am   • Dizziness     No facial droop, noted drift in left arm  [id number]

## 2021-03-07 NOTE — ED PROVIDER NOTES
"ED Provider Note    CHIEF COMPLAINT  Chief Complaint   Patient presents with   • Eye Pain     pain in left eye and intermittent loss of vision since 7pm last night, went to take a nap at 9am then woke up at 11am w/worsening symptoms   • Numbness     left facial numbness at 7pm worsen at 11am   • Dizziness       HPI  Kristin Balderrama is a 30 y.o. female who presents to the emergency room today with 2-day history of left-sided headache orbital headache and loss of vision.  Patient states she also felt dizziness started 7-9 o'clock last night continue to worsen today.  She does have a history of ocular migraines and migraines.  She was sent in by her neuro-ophthalmologist Dr. Yousif for CT scan studies.  Patient denies any loss of speech no loss of movement.  She does have some paresthesia to the left side of her face.  No chest pain, shortness of breath no fever chills or other complaints at this time.  She was not considered a candidate for TPA as she was outside the window symptoms onset 8:53 PM yesterday.    REVIEW OF SYSTEMS  See HPI for further details. All other systems are negative.     PAST MEDICAL HISTORY  Past Medical History:   Diagnosis Date   • Other fatigue 6/29/2020   • Sinus tachycardia 10/31/2013   • Pain 08-15-12    back, 7/10   • Pneumonia 2012   • Chronic UTI 9/18/2010   • Abdominal pain    • Anginal syndrome     random chest pain especially with tachycardia   • Apnea, sleep    • Arrhythmia     \"sinus tachycardia\", cariologist, Dr. Kumar; ablation 2/2016   • Arthritis     osteo   • ASTHMA     when around smoke   • Atrial fibrillation (HCC)    • Back pain    • Borderline personality disorder (HCC)    • Breath shortness     with tachycardia   • Bronchitis    • Cardiac arrhythmia    • Chickenpox    • Cough    • Daytime sleepiness    • Depression    • Diabetes (HCC)    • Diarrhea    • Disorder of thyroid    • Fall    • Fatigue    • Frequent headaches    • Gasping for breath    • Gynecological " "disorder     PCOS   • Headache(784.0)    • Heart burn    • History of falling    • Indigestion    • Migraine    • Mitochondrial disease (HCC)    • 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HCC)    • Nausea    • Obesity    • Painful joint    • PCOS (polycystic ovarian syndrome)    • Psychosis (HCC)    • Ringing in ears    • Scoliosis    • Shortness of breath    • Sleep apnea     CPAP \"pulmonary doctor took me off mid year 2016\"   • Snoring    • Tonsillitis    • 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HCC)    • Tuberculosis     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   • Urinary bladder disorder     Suprapubic cath   • Urinary incontinence    • Weakness    • Wears glasses        FAMILY HISTORY  [unfilled]    SOCIAL HISTORY  Social History     Socioeconomic History   • Marital status: Single     Spouse name: Not on file   • Number of children: Not on file   • Years of education: Not on file   • Highest education level: Not on file   Occupational History   • Not on file   Tobacco Use   • Smoking status: Never Smoker   • Smokeless tobacco: Never Used   Substance and Sexual Activity   • Alcohol use: No     Alcohol/week: 0.0 oz   • Drug use: Not Currently     Frequency: 7.0 times per week     Types: Marijuana   • Sexual activity: Not Currently     Birth control/protection: Pill   Other Topics Concern   • Not on file   Social History Narrative    ** Merged History Encounter **          Social Determinants of Health     Financial Resource Strain: Low Risk    • Difficulty of Paying Living Expenses: Not hard at all   Food Insecurity: No Food Insecurity   • Worried About Running Out of Food in the Last Year: Never true   • Ran Out of Food in the Last Year: Never true   Transportation Needs: No Transportation Needs   • Lack of Transportation (Medical): No   • Lack of Transportation (Non-Medical): No   Physical Activity:    • Days of Exercise per Week:    • Minutes of Exercise per Session:    Stress:    • Feeling of Stress :    Social Connections:    • " Frequency of Communication with Friends and Family:    • Frequency of Social Gatherings with Friends and Family:    • Attends Confucianism Services:    • Active Member of Clubs or Organizations:    • Attends Club or Organization Meetings:    • Marital Status:    Intimate Partner Violence:    • Fear of Current or Ex-Partner:    • Emotionally Abused:    • Physically Abused:    • Sexually Abused:        SURGICAL HISTORY  Past Surgical History:   Procedure Laterality Date   • MUSCLE BIOPSY Right 1/26/2017    Procedure: MUSCLE BIOPSY - THIGH;  Surgeon: Isidro Vigil M.D.;  Location: SURGERY [facility];  Service:    • GASTROSCOPY WITH BALLOON DILATATION N/A 1/4/2017    Procedure: GASTROSCOPY WITH DILATATION;  Surgeon: Torres Vargas M.D.;  Location: SURGERY [facility];  Service:    • BOWEL STIMULATOR INSERTION  7/13/2016    Procedure: BOWEL STIMULATOR INSERTION FOR PERMANENT INTERSTIM SACRAL IMPLANT;  Surgeon: Joe Noyola M.D.;  Location: SURGERY [facility];  Service:    • RECOVERY  1/27/2016    Procedure: CATH LAB EP STUDY WITH SINUS NODE MODIFICATION ABHINAV;  Surgeon: Recoveryonly Surgery;  Location: SURGERY PRE-POST PROC UNIT [facility];  Service:    • OTHER CARDIAC SURGERY  1/2016    cardiac ablation   • NEURO DEST FACET L/S W/IG SNGL  4/21/2015    Performed by Reza Tabor at [facility]   • LUMBAR FUSION ANTERIOR  8/21/2012    Performed by SUSIE SAWANT at [facility]   • ALYSSA BY LAPAROSCOPY  8/29/2010    Performed by SHAYY JOHNS at [facility]   • LAMINOTOMY     • OTHER ABDOMINAL SURGERY     • TONSILLECTOMY      tonsillectomy       CURRENT MEDICATIONS  Home Medications     Reviewed by Ulisses Courtney (Pharmacy Tech) on 03/06/21 at 1439  Med List Status: Complete   Medication Last Dose Status   acetaminophen (TYLENOL) 500 MG Tab 3/6/2021 Active   albuterol 108 (90 Base) MCG/ACT Aero Soln inhalation aerosol >1 week Active   aspirin 81 MG EC tablet 2/27/2021  "Active   baclofen (LIORESAL) 10 MG Tab 3/6/2021 Active   busPIRone (BUSPAR) 10 MG Tab tablet 3/6/2021 Active   calcium carbonate (TUMS EX) 750 MG chewable tablet >1 week Active   cephALEXin (KEFLEX) 500 MG Cap >1 week Active   diphenhydrAMINE (BENADRYL ALLERGY) 25 MG capsule 3/6/2021 Active   Dulaglutide (TRULICITY) 1.5 MG/0.5ML Solution Pen-injector 2/26/2021 Active   esomeprazole (NEXIUM) 40 MG delayed-release capsule 3/6/2021 Active   fluoxetine (PROZAC) 40 MG capsule 3/6/2021 Active   ipratropium-albuterol (DUONEB) 0.5-2.5 (3) MG/3ML nebulizer solution 3/5/2021 Active   ivabradine (CORLANOR) 7.5 MG Tab tablet 3/6/2021 Active   levothyroxine (SYNTHROID) 100 MCG Tab 3/6/2021 Active   Melatonin 10 MG Tab 3/5/2021 Active   Mirabegron ER (MYRBETRIQ) 50 MG TABLET SR 24 HR 3/6/2021 Active   mycophenolate (CELLCEPT) 500 MG tablet 3/6/2021 Active   ondansetron (ZOFRAN ODT) 4 MG TABLET DISPERSIBLE >1 week Active   OXcarbazepine (TRILEPTAL) 300 MG Tab 3/6/2021 Active   oxybutynin (DITROPAN) 5 MG Tab 3/6/2021 Active   penicillin v potassium (VEETID) 500 MG Tab 3/6/2021 Active   potassium chloride SA (KDUR) 20 MEQ Tab CR 3/6/2021 Active   predniSONE (DELTASONE) 10 MG Tab 3/6/2021 Active   pregabalin (LYRICA) 300 MG capsule 3/6/2021 Active   sodium bicarbonate 325 MG Tab 3/6/2021 Active   topiramate (TOPAMAX) 50 MG tablet 3/6/2021 Active   traZODone (DESYREL) 100 MG Tab 3/5/2021 Active   vitamin D, Ergocalciferol, (DRISDOL) 1.25 MG (84128 UT) Cap capsule 3/5/2021 Active   ziprasidone (GEODON) 40 MG Cap 3/6/2021 Active                ALLERGIES  Allergies   Allergen Reactions   • Depakote [Divalproex Sodium] Unspecified     Muscle spasms/muscle pain and weakness     • Amitriptyline Unspecified     Headaches     • Aripiprazole [Abilify] Unspecified     Headaches/muscle twitching     • Clindamycin Nausea     Even with food     • Flagyl [Metronidazole Hcl] Unspecified     \"eye problems\"     • Flomax [Tamsulosin Hydrochloride] " "Swelling   • Levaquin Unspecified     Severe muscle cramps in legs  RXN=unknown   • Metformin Unspecified     Increased lactic acid      • Tamsulosin Swelling     Swelling of legs   • Tape Rash     Tears skin off  coban with Tegaderm tape ok intermittently  RXN=ongoing   • Vancomycin Itching     Pt becomes flushed in face and chest.   RXN=7/10/16   • Wound Dressing Adhesive Hives     By pt report   • Ampicillin Rash     Pt reports that she received a rash    • Ciprofloxacin Rash         • Keflex Rash     Pt states she gets a rash with this medication  Tolerates ceftriaxone  Can take with Benadryl   • Levofloxacin Unspecified     Leg muscle cramps   • Metronidazole Rash     \"Vision problems\"   • Penicillins Hives     Can take with Benadryl   • Sulfa Drugs Itching and Myalgia     Muscle pain and weakness   • Valproic Acid Rash     .       PHYSICAL EXAM  VITAL SIGNS: /64   Pulse 76   Temp 36.3 °C (97.3 °F) (Oral)   Resp 18   Wt 108 kg (237 lb 10.5 oz)   SpO2 96%   BMI 39.55 kg/m²       Constitutional: Well developed, Well nourished,  acute distress, Non-toxic appearance.   HENT: Normocephalic, Atraumatic, Bilateral external ears normal, Oropharynx moist, No oral exudates, Nose normal.   Eyes: PERRLA, EOMI, Conjunctiva normal, No discharge.   Neck: Normal range of motion, No tenderness, Supple, No stridor.   Lymphatic: No lymphadenopathy noted.   Cardiovascular: Normal heart rate, Normal rhythm, No murmurs, No rubs, No gallops.   Thorax & Lungs: Normal breath sounds, No respiratory distress, No wheezing, No chest tenderness.   Abdomen: Bowel sounds normal, Soft, No tenderness, No masses, No pulsatile masses.   Skin: Warm, Dry, No erythema, No rash.   Back: No tenderness, No CVA tenderness.     Extremities: Intact distal pulses, No edema, No tenderness, No cyanosis, No clubbing.   Musculoskeletal: Good range of motion in all major joints. No tenderness to palpation or major deformities noted.   Neurologic: " Alert & oriented x 3, Normal motor function, Normal sensory function, No focal deficits noted.  Patient has some paresthesia subjective on the left facial area from the forehead down to the malar surface as compared to the right.  NIH scale of 1 she was not considered a candidate for TPA as discussed above discussed case with neurologist as well as with neuro-ophthalmologist Dr. Yousif.  Psychiatric: Affect normal, Judgment normal, Mood normal.     EKG  Normal sinus rhythm at 80 bpm, no ST elevation or depression, no widening of the QRS complex, good R wave progression, intervals are normal.  This is a twelve-lead EKG there is no previous EKG available this time for comparison.    RADIOLOGY/PROCEDURES  EC-ECHOCARDIOGRAM COMPLETE W/O CONT   Final Result      DX-CHEST-PORTABLE (1 VIEW)   Final Result      No acute cardiac or pulmonary abnormalities are identified.      CT-CTA NECK WITH & W/O-POST PROCESSING   Final Result      CT angiogram of the neck within normal limits.      CT-CTA HEAD WITH & W/O-POST PROCESS   Final Result      CT angiogram of the [place] within normal limits.      CT-CEREBRAL PERFUSION ANALYSIS   Final Result      1.  Cerebral blood flow less than 30% likely representing completed infarct = 0 mL.      2.  T Max more than 6 seconds likely representing combination of completed infarct and ischemia = 0 mL.      3.  Mismatched volume likely representing ischemic brain/penumbra = None      4.  Please note that the cerebral perfusion was performed on the limited brain tissue around the basal ganglia region. Infarct/ischemia outside the CT perfusion sections can be missed in this study.      CT-HEAD W/O   Final Result      Head CT without contrast within normal limits. No evidence of acute cerebral infarction, hemorrhage or mass lesion.            COURSE & MEDICAL DECISION MAKING  Pertinent Labs & Imaging studies reviewed. (See chart for details)  CT scan CTA of the head and neck were  negative for acute process.  Patient had echocardiogram performed this was unremarkable.  Discussed case with Dr. Yousif felt that this may be a retinal artery vasospasm causing ocular migraine.  Patient was given medications discussed case with neurologist Dr. Rincon who ordered medications for patient.  She has follow-up with Dr. Casillas with this coming week return if persistent worsening symptoms on reexamination she remains improved and no neurological deficits.    FINAL IMPRESSION  1.  Acute retinal artery vasospasm, ocular migraine  2.  Acute facial paresthesias  3.  History of transverse myelitis  4.  History of schizophrenia/borderline personality disorder         Electronically signed by: Armand Mcgill D.O., 3/6/2021 6:47 PM

## 2021-03-07 NOTE — ED NOTES
Discharge instructions and follow up care discussed with patient. Patient given time to ask questions and verbalized understanding. Patient AOx4 at discharge and ambulatory to lobby with FWW.

## 2021-03-07 NOTE — ED NOTES
Pt reports improvement in eye pain but persistence of altered sensation to L face and LLE. Pt resting otherwise comfortably with needs met.

## 2021-03-08 ENCOUNTER — PRE-ADMISSION TESTING (OUTPATIENT)
Dept: ADMISSIONS | Facility: MEDICAL CENTER | Age: 31
End: 2021-03-08
Attending: COLON & RECTAL SURGERY
Payer: MEDICARE

## 2021-03-08 NOTE — OR NURSING
Pre admit tele appointment:    Pt stated does not want to take any of her AM medications DOS because she has to eat when taking her meds, will take as soon as she gets home, anesthesia notified.    Has infected tooth, patient said Dr Noyola is aware.    ASA instructions per Dr Noyola

## 2021-03-08 NOTE — THERAPY
"Occupational Therapy Evaluation completed.   Functional Status:  Pt sat edge of bed with max assist of 2 people for bed mobility. Once seated EOB, pt completed oral care with set-up using her left UE. She has trace movement in her right hand, but otherwise has no active movement in her RUE.  Pt was able to maintain static sitting balance at EOB with CGA. Pt unable to achieve standing with 2-person assist and 3 attempts.   Plan of Care: Will benefit from Occupational Therapy 3 times per week  Discharge Recommendations:  Equipment: Will Continue to Assess for Equipment Needs. Post-acute therapy recommended before discharged home.    See \"Rehab Therapy-Acute\" Patient Summary Report for complete documentation.    "
"Occupational Therapy Treatment completed with focus on ADLs, ADL transfers and patient education.  Functional Status:  Pt seen for OT tx. Min A transfer from EOB to shower chair using FWW. Completed seated shower with min A. SBA to don gown and max A for LB dressing. Min A transfer BTB and CGA for LE assistance to return to supine.   Plan of Care: Will benefit from Occupational Therapy 3 times per week  Discharge Recommendations:  Equipment Will Continue to Assess for Equipment Needs. Post-acute therapy recommended before discharged home.    See \"Rehab Therapy-Acute\" Patient Summary Report for complete documentation.   "
"Occupational Therapy Treatment completed with focus on ADLs, patient education and upper extremity function.  Functional Status:  Pt seen for OT tx today, pt performed supine to sit from raised hob with mod/max a; required assist to bring BLE out of the bed, muscle activation noted, min a to bring trunk upright, F+ balance seated eob and completed oral care and facewashing with sba and cues and faciliation of one-handed techniques during oral care, able to open/close toothpaste cap and put it on toothbrush with supervision after education. Pt participated in wt-shifting side to side and coming to midline x10 with cues for pacing and appropriate hand positioning on R side, pt scooted sideways in bed with cga/sba and returned back to bed with mod a for positioning BLE's. Pt min/mod assist to roll to Lt side using bed rails. Pt would continue to benefit from acute skilled services while in house and recommended post acute skilled services prior to d/c home.   Plan of Care: Will benefit from Occupational Therapy 3 times per week  Discharge Recommendations:  Equipment Will Continue to Assess for Equipment Needs. Post-acute therapy recommended before discharged home.    See \"Rehab Therapy-Acute\" Patient Summary Report for complete documentation.   "
"Physical Therapy Treatment completed.   Bed Mobility:  Supine to Sit: Moderate Assist  Transfers: Sit to Stand: Maximal Assist  Gait: Level Of Assist: Unable to Participate   Plan of Care: Will benefit from Physical Therapy 3 times per week  Discharge Recommendations: Equipment: Will Continue to Assess for Equipment Needs. Post-acute therapy recommended before discharged home.     See \"Rehab Therapy-Acute\" Patient Summary Report for complete documentation.       "
"Physical Therapy Treatment completed.   Bed Mobility:  Supine to Sit: Moderate Assist  Transfers: Sit to Stand: Minimal Assist (from EOB->FWW)  Gait: Level Of Assist: Minimal Assist with Front-Wheel Walker. Pt amb 5' today.        Plan of Care: Will benefit from Physical Therapy 3 times per week  Discharge Recommendations: Equipment: No Equipment Needed.       See \"Rehab Therapy-Acute\" Patient Summary Report for complete documentation.       "
"Pt is a 25 y/o female presenting to physical therapy with possible mitochondrial disorder and obesity. Pt also has a suprapubic catheter with recent hx of UTI. Has good support from grandparents and paid caregivers. Currently unable to actively move B LE or R UE. Pt will benefit from acute skilled PT interventions to address present deficits. May require placement upon D/C to continue therapies prior to return home    Physical Therapy Evaluation completed.   Bed Mobility:  Supine to Sit: Maximal Assist (x2 ppl, able to assist some using L UE and railing)  Transfers: Sit to Stand:  (attempted x3 trials, unable to stand with Total A x2)  Gait: Level Of Assist: Unable to Participate        Plan of Care: Will benefit from Physical Therapy 3 times per week  Discharge Recommendations: Equipment: Will Continue to Assess for Equipment Needs. Post-acute therapy recommended before discharged home.    See \"Rehab Therapy-Acute\" Patient Summary Report for complete documentation.     "
"Speech Language Therapy Clinical Swallow Evaluation completed.  Functional Status: The patient was seen for clinical swallow evaluation this date. The patient was awake, alert and oriented x4. The patient reported ongoing dysphagia primarily with solids. EMR revealed a recent MBS was completed last month which showed no aspiration however, concerns for esophageal dysphagia were noted. The patient was seen during her regular/thin liquid meal tray. The patient was noted to have intermittent delayed (dry) cough which became more consistent when patient would take multiple bites in a row or increase rate of eating. With strategies to take small bites/sips, alternate solids and liquids (preferably warm given suspected esophageal dysphagia) patient had significant decrease in delayed cough. At this time, recommend downgrade to baseline diet of soft (moist) solids with continued follow through with swallow strategies. SLP to follow x1 to ensure follow through with swallow strategies and no overt oropharyngeal dysphagia appears.     Recommendations - Diet:  Dysphagia II, Thin Liquid                          Strategies: Monitor during meals, assist with tray set up and Head of Bed at 90 Degrees                          Medication Administration: Medication Administration : Whole with Liquid Wash (or as tolerated/Pt preference)    Plan of Care: Will benefit from Speech Therapy follow-up.  See \"Rehab Therapy-Acute\" Patient Summary Report for complete documentation.   "
"Speech Language Therapy dysphagia treatment completed.   Functional Status:  The patient was seen for dysphagia therapy this date. The patient was awake, alert and eating D2/thin liquid meal tray. The patient was noted to have regular diet textures at bedside as well. The patient consumed D2/thin liquid meals with no overt s/s of aspiration or c/o globus. Regular diet textures were tried (chips) with noted delayed cough and pt c/o globus sensation. Warm liquid strategy was utilized with minimal improvement reported by patient. At this time, patient's dysphagia continues to appear more esophageal in nature. Patient and her grandmother were provided education regarding f/u with GI or out patient barium swallow study should s/s of esophageal dysphagia worsen. Patient aware to monitor for any respiratory changes and to continue to complete dysphagia exercises to maintian integrity of swallow. No further skilled SLP services indicated at this time.     Recommendations: Continue with D2/thin liquid meals d/t concerns for esophageal dysphagia.      Plan of Care: Patient with no further skilled SLP needs in the acute care setting at this time.    See \"Rehab Therapy-Acute\" Patient Summary Report for complete documentation.     "
declines

## 2021-03-09 ENCOUNTER — ANESTHESIA EVENT (OUTPATIENT)
Dept: SURGERY | Facility: MEDICAL CENTER | Age: 31
End: 2021-03-09
Payer: MEDICARE

## 2021-03-10 ENCOUNTER — ANESTHESIA (OUTPATIENT)
Dept: SURGERY | Facility: MEDICAL CENTER | Age: 31
End: 2021-03-10
Payer: MEDICARE

## 2021-03-10 ENCOUNTER — APPOINTMENT (OUTPATIENT)
Dept: RADIOLOGY | Facility: MEDICAL CENTER | Age: 31
End: 2021-03-10
Attending: COLON & RECTAL SURGERY
Payer: MEDICARE

## 2021-03-10 ENCOUNTER — HOSPITAL ENCOUNTER (OUTPATIENT)
Facility: MEDICAL CENTER | Age: 31
End: 2021-03-10
Attending: COLON & RECTAL SURGERY | Admitting: COLON & RECTAL SURGERY
Payer: MEDICARE

## 2021-03-10 VITALS
DIASTOLIC BLOOD PRESSURE: 70 MMHG | BODY MASS INDEX: 39.85 KG/M2 | OXYGEN SATURATION: 94 % | RESPIRATION RATE: 16 BRPM | TEMPERATURE: 97.2 F | HEIGHT: 65 IN | HEART RATE: 88 BPM | SYSTOLIC BLOOD PRESSURE: 114 MMHG | WEIGHT: 239.2 LBS

## 2021-03-10 DIAGNOSIS — G89.18 POSTOPERATIVE PAIN: ICD-10-CM

## 2021-03-10 LAB
GLUCOSE BLD-MCNC: 114 MG/DL (ref 65–99)
GLUCOSE BLD-MCNC: 77 MG/DL (ref 65–99)
HCG SERPL QL: NEGATIVE

## 2021-03-10 PROCEDURE — 502000 HCHG MISC OR IMPLANTS RC 0278: Performed by: COLON & RECTAL SURGERY

## 2021-03-10 PROCEDURE — A9270 NON-COVERED ITEM OR SERVICE: HCPCS | Performed by: COLON & RECTAL SURGERY

## 2021-03-10 PROCEDURE — 700102 HCHG RX REV CODE 250 W/ 637 OVERRIDE(OP): Performed by: STUDENT IN AN ORGANIZED HEALTH CARE EDUCATION/TRAINING PROGRAM

## 2021-03-10 PROCEDURE — 700111 HCHG RX REV CODE 636 W/ 250 OVERRIDE (IP): Performed by: STUDENT IN AN ORGANIZED HEALTH CARE EDUCATION/TRAINING PROGRAM

## 2021-03-10 PROCEDURE — 160009 HCHG ANES TIME/MIN: Performed by: COLON & RECTAL SURGERY

## 2021-03-10 PROCEDURE — A9270 NON-COVERED ITEM OR SERVICE: HCPCS | Performed by: STUDENT IN AN ORGANIZED HEALTH CARE EDUCATION/TRAINING PROGRAM

## 2021-03-10 PROCEDURE — 160048 HCHG OR STATISTICAL LEVEL 1-5: Performed by: COLON & RECTAL SURGERY

## 2021-03-10 PROCEDURE — 160028 HCHG SURGERY MINUTES - 1ST 30 MINS LEVEL 3: Performed by: COLON & RECTAL SURGERY

## 2021-03-10 PROCEDURE — 700101 HCHG RX REV CODE 250: Performed by: STUDENT IN AN ORGANIZED HEALTH CARE EDUCATION/TRAINING PROGRAM

## 2021-03-10 PROCEDURE — C1787 PATIENT PROGR, NEUROSTIM: HCPCS | Performed by: COLON & RECTAL SURGERY

## 2021-03-10 PROCEDURE — 160039 HCHG SURGERY MINUTES - EA ADDL 1 MIN LEVEL 3: Performed by: COLON & RECTAL SURGERY

## 2021-03-10 PROCEDURE — 160035 HCHG PACU - 1ST 60 MINS PHASE I: Performed by: COLON & RECTAL SURGERY

## 2021-03-10 PROCEDURE — 160002 HCHG RECOVERY MINUTES (STAT): Performed by: COLON & RECTAL SURGERY

## 2021-03-10 PROCEDURE — 502240 HCHG MISC OR SUPPLY RC 0272: Performed by: COLON & RECTAL SURGERY

## 2021-03-10 PROCEDURE — 700101 HCHG RX REV CODE 250: Performed by: COLON & RECTAL SURGERY

## 2021-03-10 PROCEDURE — C1778 LEAD, NEUROSTIMULATOR: HCPCS | Performed by: COLON & RECTAL SURGERY

## 2021-03-10 PROCEDURE — 160046 HCHG PACU - 1ST 60 MINS PHASE II: Performed by: COLON & RECTAL SURGERY

## 2021-03-10 PROCEDURE — 501838 HCHG SUTURE GENERAL: Performed by: COLON & RECTAL SURGERY

## 2021-03-10 PROCEDURE — 82962 GLUCOSE BLOOD TEST: CPT

## 2021-03-10 PROCEDURE — 160025 RECOVERY II MINUTES (STATS): Performed by: COLON & RECTAL SURGERY

## 2021-03-10 PROCEDURE — 160036 HCHG PACU - EA ADDL 30 MINS PHASE I: Performed by: COLON & RECTAL SURGERY

## 2021-03-10 PROCEDURE — C1820 GENERATOR NEURO RECHG BAT SY: HCPCS | Performed by: COLON & RECTAL SURGERY

## 2021-03-10 PROCEDURE — 700105 HCHG RX REV CODE 258: Performed by: COLON & RECTAL SURGERY

## 2021-03-10 PROCEDURE — 84703 CHORIONIC GONADOTROPIN ASSAY: CPT

## 2021-03-10 DEVICE — IMPLANTABLE DEVICE: Type: IMPLANTABLE DEVICE | Site: BACK | Status: FUNCTIONAL

## 2021-03-10 RX ORDER — OXYCODONE HCL 5 MG/5 ML
10 SOLUTION, ORAL ORAL
Status: COMPLETED | OUTPATIENT
Start: 2021-03-10 | End: 2021-03-10

## 2021-03-10 RX ORDER — DEXTROSE MONOHYDRATE 25 G/50ML
50 INJECTION, SOLUTION INTRAVENOUS
Status: DISCONTINUED | OUTPATIENT
Start: 2021-03-10 | End: 2021-03-10 | Stop reason: HOSPADM

## 2021-03-10 RX ORDER — SODIUM CHLORIDE, SODIUM LACTATE, POTASSIUM CHLORIDE, CALCIUM CHLORIDE 600; 310; 30; 20 MG/100ML; MG/100ML; MG/100ML; MG/100ML
INJECTION, SOLUTION INTRAVENOUS CONTINUOUS
Status: ACTIVE | OUTPATIENT
Start: 2021-03-10 | End: 2021-03-10

## 2021-03-10 RX ORDER — CEFAZOLIN SODIUM 1 G/3ML
INJECTION, POWDER, FOR SOLUTION INTRAMUSCULAR; INTRAVENOUS PRN
Status: DISCONTINUED | OUTPATIENT
Start: 2021-03-10 | End: 2021-03-10 | Stop reason: SURG

## 2021-03-10 RX ORDER — BUPIVACAINE HYDROCHLORIDE AND EPINEPHRINE 5; 5 MG/ML; UG/ML
INJECTION, SOLUTION EPIDURAL; INTRACAUDAL; PERINEURAL
Status: DISCONTINUED | OUTPATIENT
Start: 2021-03-10 | End: 2021-03-10 | Stop reason: HOSPADM

## 2021-03-10 RX ORDER — ALBUTEROL SULFATE 90 UG/1
2 AEROSOL, METERED RESPIRATORY (INHALATION) ONCE
Status: COMPLETED | OUTPATIENT
Start: 2021-03-10 | End: 2021-03-10

## 2021-03-10 RX ORDER — OXYCODONE HCL 5 MG/5 ML
5 SOLUTION, ORAL ORAL
Status: COMPLETED | OUTPATIENT
Start: 2021-03-10 | End: 2021-03-10

## 2021-03-10 RX ORDER — ONDANSETRON 2 MG/ML
4 INJECTION INTRAMUSCULAR; INTRAVENOUS
Status: DISCONTINUED | OUTPATIENT
Start: 2021-03-10 | End: 2021-03-10 | Stop reason: HOSPADM

## 2021-03-10 RX ORDER — ONDANSETRON 2 MG/ML
INJECTION INTRAMUSCULAR; INTRAVENOUS PRN
Status: DISCONTINUED | OUTPATIENT
Start: 2021-03-10 | End: 2021-03-10 | Stop reason: SURG

## 2021-03-10 RX ORDER — HALOPERIDOL 5 MG/ML
1 INJECTION INTRAMUSCULAR
Status: DISCONTINUED | OUTPATIENT
Start: 2021-03-10 | End: 2021-03-10 | Stop reason: HOSPADM

## 2021-03-10 RX ORDER — OXYCODONE HYDROCHLORIDE 5 MG/1
5 TABLET ORAL EVERY 8 HOURS PRN
Qty: 12 TABLET | Refills: 0 | Status: SHIPPED | OUTPATIENT
Start: 2021-03-10 | End: 2021-03-13

## 2021-03-10 RX ORDER — DIPHENHYDRAMINE HYDROCHLORIDE 50 MG/ML
12.5 INJECTION INTRAMUSCULAR; INTRAVENOUS
Status: DISCONTINUED | OUTPATIENT
Start: 2021-03-10 | End: 2021-03-10 | Stop reason: HOSPADM

## 2021-03-10 RX ORDER — HYDROMORPHONE HYDROCHLORIDE 1 MG/ML
0.2 INJECTION, SOLUTION INTRAMUSCULAR; INTRAVENOUS; SUBCUTANEOUS
Status: DISCONTINUED | OUTPATIENT
Start: 2021-03-10 | End: 2021-03-10 | Stop reason: HOSPADM

## 2021-03-10 RX ORDER — ACETAMINOPHEN 500 MG
1000 TABLET ORAL ONCE
Status: COMPLETED | OUTPATIENT
Start: 2021-03-10 | End: 2021-03-10

## 2021-03-10 RX ORDER — HYDROMORPHONE HYDROCHLORIDE 1 MG/ML
0.1 INJECTION, SOLUTION INTRAMUSCULAR; INTRAVENOUS; SUBCUTANEOUS
Status: DISCONTINUED | OUTPATIENT
Start: 2021-03-10 | End: 2021-03-10 | Stop reason: HOSPADM

## 2021-03-10 RX ORDER — LIDOCAINE HYDROCHLORIDE AND EPINEPHRINE 10; 10 MG/ML; UG/ML
INJECTION, SOLUTION INFILTRATION; PERINEURAL
Status: DISCONTINUED | OUTPATIENT
Start: 2021-03-10 | End: 2021-03-10 | Stop reason: HOSPADM

## 2021-03-10 RX ORDER — HYDROMORPHONE HYDROCHLORIDE 1 MG/ML
0.4 INJECTION, SOLUTION INTRAMUSCULAR; INTRAVENOUS; SUBCUTANEOUS
Status: DISCONTINUED | OUTPATIENT
Start: 2021-03-10 | End: 2021-03-10 | Stop reason: HOSPADM

## 2021-03-10 RX ORDER — MEPERIDINE HYDROCHLORIDE 25 MG/ML
12.5 INJECTION INTRAMUSCULAR; INTRAVENOUS; SUBCUTANEOUS
Status: DISCONTINUED | OUTPATIENT
Start: 2021-03-10 | End: 2021-03-10 | Stop reason: HOSPADM

## 2021-03-10 RX ORDER — MIDAZOLAM HYDROCHLORIDE 1 MG/ML
INJECTION INTRAMUSCULAR; INTRAVENOUS PRN
Status: DISCONTINUED | OUTPATIENT
Start: 2021-03-10 | End: 2021-03-10 | Stop reason: SURG

## 2021-03-10 RX ORDER — GABAPENTIN 300 MG/1
600 CAPSULE ORAL ONCE
Status: COMPLETED | OUTPATIENT
Start: 2021-03-10 | End: 2021-03-10

## 2021-03-10 RX ADMIN — CEFAZOLIN 2 G: 330 INJECTION, POWDER, FOR SOLUTION INTRAMUSCULAR; INTRAVENOUS at 08:26

## 2021-03-10 RX ADMIN — FENTANYL CITRATE 50 MCG: 50 INJECTION, SOLUTION INTRAMUSCULAR; INTRAVENOUS at 09:47

## 2021-03-10 RX ADMIN — ONDANSETRON 4 MG: 2 INJECTION INTRAMUSCULAR; INTRAVENOUS at 08:30

## 2021-03-10 RX ADMIN — ROCURONIUM BROMIDE 40 MG: 10 INJECTION, SOLUTION INTRAVENOUS at 08:26

## 2021-03-10 RX ADMIN — GABAPENTIN 600 MG: 300 CAPSULE ORAL at 07:24

## 2021-03-10 RX ADMIN — SODIUM CHLORIDE, POTASSIUM CHLORIDE, SODIUM LACTATE AND CALCIUM CHLORIDE: 600; 310; 30; 20 INJECTION, SOLUTION INTRAVENOUS at 07:24

## 2021-03-10 RX ADMIN — POVIDONE IODINE 15 ML: 100 SOLUTION TOPICAL at 07:24

## 2021-03-10 RX ADMIN — FENTANYL CITRATE 50 MCG: 50 INJECTION, SOLUTION INTRAMUSCULAR; INTRAVENOUS at 09:40

## 2021-03-10 RX ADMIN — MIDAZOLAM HYDROCHLORIDE 2 MG: 1 INJECTION, SOLUTION INTRAMUSCULAR; INTRAVENOUS at 08:26

## 2021-03-10 RX ADMIN — FENTANYL CITRATE 100 MCG: 50 INJECTION, SOLUTION INTRAMUSCULAR; INTRAVENOUS at 08:26

## 2021-03-10 RX ADMIN — PROPOFOL 150 MG: 10 INJECTION, EMULSION INTRAVENOUS at 08:25

## 2021-03-10 RX ADMIN — OXYCODONE HYDROCHLORIDE 10 MG: 5 SOLUTION ORAL at 09:39

## 2021-03-10 RX ADMIN — ALBUTEROL SULFATE 2 PUFF: 90 AEROSOL, METERED RESPIRATORY (INHALATION) at 07:45

## 2021-03-10 RX ADMIN — SUGAMMADEX 200 MG: 100 INJECTION, SOLUTION INTRAVENOUS at 08:51

## 2021-03-10 ASSESSMENT — FIBROSIS 4 INDEX: FIB4 SCORE: 0.39

## 2021-03-10 ASSESSMENT — PAIN DESCRIPTION - PAIN TYPE
TYPE: SURGICAL PAIN

## 2021-03-10 ASSESSMENT — PAIN SCALES - GENERAL: PAIN_LEVEL: 7

## 2021-03-10 NOTE — OP REPORT
NAME:  Kristin Balderrama  MRN:  [id number]  :  1990    DATE OF OPERATION: 3/10/2021    PREOPERATIVE DIAGNOSIS:  Fecal Incontinence and Urinary Incontinence, non-functioning Interstim device    POSTOPERATIVE DIAGNOSES:  Fecal Incontinence Urinary Incontinence  non-functioning Interstim device    OPERATION PERFORMED:   1. Complete sacral neuromodulation system implantation   2. Incision and implantation of tined quadrapolar lead electrodes in foramen S3   3. Intraoperative fluoroscopic guidance for needle placement and interpretation  4. Subcutaneous implantation of sacral nerve neurostimulator and electric analysis and programing   5. Removal of prior Interstim device including lead and generator    SURGEON: Joe Noyola MD    ASSISTANT:  KIRK Ceballos PA-C, PA-C    ANESTHESIOLOGIST:  Anesthesiologist: Wesley Jensen M.D.    ANESTHESIA: General     ESTIMATED BLOOD LOSS: <5cc.     INDICATIONS FOR PROCEDURE: The patient is a 30 y.o. female with a history of refractory urinary and fecal incontinence and multiple attempts and prior measures and treatments taken without adequate relief. She did have improvement with the Interstim device initially but it is no longer working. She is taken to the operating room today for sacral neuromodulation full system implantation and removal of the Interstim device.     DETAILS OF PROCEDURE:  After an extensive informed consent discussion and process, the patient was brought to the operating room. She was placed in a prone position on the operating table. After induction of conscious sedation, pillows were placed under the lower abdomen and under the shins to flatten the sacrum and allow the toes to dangle freely.  The patient was prepped and draped in the usual sterile fashion.     After administration of intravenous antibiotics, a local anesthetic mixture of bupivacaine with epinephrine was used to infiltrate the skin sites.  The C-arm was then draped and moved into AP  "position to provide fluoroscopic mapping of the sacral region which included marking out midline of the sacrum, SI joints, sciatic notches, medial foramenal borders and sacral foramena.  The C-arm was moved to the lateral position to image the area from sacral promontory to the coccyx. After injecting the tissues with 0.25% bupivicaine with epi, the prior right iliac fossa scar was incised, and the scar over the lead insertion site were incised with an 11 blade scalpel. The device and its capsule, and the lead were removed without difficulty. The sites were closed with Vicryl suture inlayers.     The foramen needle was introduced approximately 2 cm above the sciatic notch and 2 cm lateral to the sacral midline, feeling for foramenal margins until the right S3 foramen was identified and penetrated.  The depth of the foramen needle was confirmed and adjusted fluoroscopically.  Proper needle position was confirmed by identification of location and sensation, direct observation of the lifting of the perineum or \"bellowing\", and observation of plantar flexion of the great toe utilizing the external test stimulator.     The foramen needle stylet was removed and a directional guide was placed and confirmed fluoroscopically.  The foramen needle was removed.  An incision was made peripherally to the directional guide through the fascial layer.  The lead introducer sheath and dilator was placed over the directional guide and directed into the foramen to ensure the radiopaque marker of the lead introducer did not extend beyond the anterior edge of the sacrum.  The dilator was unlocked and removed along with the directional guide.  The lead was then placed through the introducer sheath to the first white line.  The position was then checked fluoroscopically.  The lead was then further introduced until 3 electrodes were visible below the sacrum.  Each electrode was tested for location and patient sensation, visualization of " "\"kirti\", and plantar flexion of the great toe.  After satisfactory positioning was confirmed, the introducer sheath was retracted under continuous fluoroscopy, deploying lead tines into the perisacral tissue.    Further incision was made into the subcutaneous tissue posterior to the right iliac crest and lateral to the sacrum.  Blunt dissection was continued until the gluteal fascia was identified and hemostasis was achieved allowing for a sufficient pocket for the neurostimulator.  A tunneling tool with straw was placed from the lead exit site subcutaneously to the incised pocket site.  The tunneling tool was removed and the lead was fed through the straw and pulled out at the pocket site.  The lead was cleansed of bodily fluids and dried.  The lead was then inserted into the [employer] neurostimulator header.  The single set screw was tightened with the hex wrench.    The neurostimulator was placed into the subcutaneous pocket with the etched identification side placed upwards and excessive lead wrapped counterclockwise around the neurostimulator.  The programming head connected and found adequate lead connection and that parameters were within normal limits.  Impedances were confirmed to be within normal limits, greater than 50 and less than 4,000.    The wounds were then irrigated with antibiotic solution and closed with 4-0 vicryl subcuticular sutures.  Steristrips and guaze 4x4's were placed over the incisions. The patient tolerated the procedure well and there were no apparent complications. All sponge, needle, and instrument counts were correct on 2 separate occasions.      She was awakened and transferred to the recovery room in satisfactory condition.Using the clinician , the generator was programmed for:  pulse frequency rate, pulse amplitude, pulse duration, cycling, stimulation train duration, train spacing, number of programs and alternating electrode polarities.  The final electrode " selections were set.    The total time spent performing programming was approximately 15 min.  The patient was given instructions on utilizing the patient  prior to discharge.           ____________________________________   Joe Noyola MD  DD: 3/10/2021  10:33 AM    CC:  [facility];

## 2021-03-10 NOTE — ANESTHESIA PROCEDURE NOTES
Airway    Date/Time: 3/10/2021 8:27 AM  Performed by: Wesley Jensen M.D.  Authorized by: Wesley Jensen M.D.     Location:  OR  Urgency:  Elective  Indications for Airway Management:  Anesthesia      Spontaneous Ventilation: absent    Sedation Level:  Deep  Preoxygenated: Yes    Patient Position:  Sniffing  Mask Difficulty Assessment:  3 - difficult mask (inadequate, unstable or two providers) +/- NMBA  Final Airway Type:  Endotracheal airway  Final Endotracheal Airway:  ETT  Cuffed: Yes    Technique Used for Successful ETT Placement:  Video laryngoscopy    Insertion Site:  Oral  Blade Type:  [place]  Laryngoscope Blade/Videolaryngoscope Blade Size:  3  ETT Size (mm):  7.0  Measured from:  Teeth  ETT to Teeth (cm):  21  Placement Verified by: auscultation and capnometry    Cormack-Lehane Classification:  Grade IIa - partial view of glottis  Number of Attempts at Approach:  1  Ventilation Between Attempts:  None  Number of Other Approaches Attempted:  0

## 2021-03-10 NOTE — OR NURSING
Patient tolerating box lunch now.  States pain is usually at 7-8/10 and that she feels pain now is tolerable.  VSS. Cont to monitor.  To transfer to stage 2 when finishes her meal.

## 2021-03-10 NOTE — OR NURSING
Patient A+Ox4. Denies nausea. States pain level 7/10 but coming down.  Right back incision sites x2 with small amount of bloody drainage. VSS.  Plan to discharge home today.  Mom updated with patient status and plan.  Cont to monitor

## 2021-03-10 NOTE — DISCHARGE INSTRUCTIONS
ACTIVITY: Rest and take it easy for the first 24 hours.  A responsible adult is recommended to remain with you during that time.  It is normal to feel sleepy.  We encourage you to not do anything that requires balance, judgment or coordination.    MILD FLU-LIKE SYMPTOMS ARE NORMAL. YOU MAY EXPERIENCE GENERALIZED MUSCLE ACHES, THROAT IRRITATION, HEADACHE AND/OR SOME NAUSEA.    FOR 24 HOURS DO NOT:  Drive, operate machinery or run household appliances.  Drink beer or alcoholic beverages.   Make important decisions or sign legal documents.    SPECIAL INSTRUCTIONS: Sacral Neuromodulation Implant D/C instructions:     1. DIET: Upon discharge from the hospital you may resume your normal preoperative diet. Depending on how you are feeling and whether you have nausea or not, you may wish to stay with a bland diet for the first few days. However, you can advance this as quickly as you feel ready.     2. ACTIVITIES: After discharge from the hospital, you may resume full routine activities. However, there should be no strenuous activities until after your follow-up visit. Please follow axonics implant guide.There should be no strenous activity for 6 weeks post axonics implant. Otherwise, routine activities of daily living are acceptable.     3. DRIVING: You may drive whenever you are able to perform the activities needed to drive.     4. BATHING: You should not get the wound or dressing wet after leaving the hospital for 48 hours. Okay to shower after 2 days.       5. BOWEL FUNCTION: Constipation is common after a procedure, especially with pain medications. The combination of pain medication and decreased activity level can cause constipation in otherwise normal patients. If you feel this is occurring, take a laxative (Miralax, Milk of Magnesia, Ex-Lax, Senokot, etc.) until the problem has resolved.     6. PAIN MEDICATION: You will be given prescription pain medications to help alleviate ant immediate post-op discomfort. It  is important to remember not to take medications on an empty stomach as this may cause nausea.     7.CALL IF YOU HAVE: (1) Fevers to more than 101.0 F, (2) Unusual chest or leg pain, (3) Drainage or fluid from wire insertion sites that may be foul smelling, increased tenderness or soreness at the wound or the wound edges are no longer together, redness or swelling at the wire insertion sites. Please do not hesitate to call with any other questions.     8. APPOINTMENT: Contact our office at [phone] for a follow-up appointment in the office in 1 week following your procedure, unless it has already been prescheduled.     If you have any additional questions, please do not hesitate to call the office and speak to either myself or the physician on call.     Office address:   [facility].   [address] [place] [id number]    DIET: To avoid nausea, slowly advance diet as tolerated, avoiding spicy or greasy foods for the first day.  Add more substantial food to your diet according to your physician's instructions.  Babies can be fed formula or breast milk as soon as they are hungry.  INCREASE FLUIDS AND FIBER TO AVOID CONSTIPATION.    FOLLOW-UP APPOINTMENT:  A follow-up appointment should be arranged with your doctor in *1 week*; call to schedule.    You should CALL YOUR PHYSICIAN if you develop:  Fever greater than 101 degrees F.  Pain not relieved by medication, or persistent nausea or vomiting.  Excessive bleeding (blood soaking through dressing) or unexpected drainage from the wound.  Extreme redness or swelling around the incision site, drainage of pus or foul smelling drainage.  Inability to urinate or empty your bladder within 8 hours.  Problems with breathing or chest pain.    You should call 911 if you develop problems with breathing or chest pain.  If you are unable to contact your doctor or surgical center, you should go to the nearest emergency room or urgent care center.   Physician's telephone #: Dr. Noyola [phone]    If any questions arise, call your doctor.  If your doctor is not available, please feel free to call the Surgical Center at [phone]. The Contact Center is open Monday through Friday 7AM to 5PM and may speak to a nurse at [phone], or toll free at [phone].     A registered nurse may call you a few days after your surgery to see how you are doing after your procedure.    MEDICATIONS: Resume taking daily medication.  Take prescribed pain medication with food.  If no medication is prescribed, you may take non-aspirin pain medication if needed.  PAIN MEDICATION CAN BE VERY CONSTIPATING.  Take a stool softener or laxative such as senokot, pericolace, or milk of magnesia if needed.    Prescription given for *Roxicodone*.  Last pain medication given at 9:39 AM.    If your physician has prescribed pain medication that includes Acetaminophen (Tylenol), do not take additional Acetaminophen (Tylenol) while taking the prescribed medication.    Depression / Suicide Risk    As you are discharged from this [facility] facility, it is important to learn how to keep safe from harming yourself.    Recognize the warning signs:  · Abrupt changes in personality, positive or negative- including increase in energy   · Giving away possessions  · Change in eating patterns- significant weight changes-  positive or negative  · Change in sleeping patterns- unable to sleep or sleeping all the time   · Unwillingness or inability to communicate  · Depression  · Unusual sadness, discouragement and loneliness  · Talk of wanting to die  · Neglect of personal appearance   · Rebelliousness- reckless behavior  · Withdrawal from people/activities they love  · Confusion- inability to concentrate     If you or a loved one observes any of these behaviors or has concerns about self-harm, here's what you can do:  · Talk about it- your feelings and reasons for harming yourself  · Remove any  means that you might use to hurt yourself (examples: pills, rope, extension cords, firearm)  · Get professional help from the community (Mental Health, Substance Abuse, psychological counseling)  · Do not be alone:Call your Safe Contact- someone whom you trust who will be there for you.  · Call your local CRISIS HOTLINE 762-7915 or 980-155-7461  · Call your local Children's Mobile Crisis Response Team [place] (854) 967-0874 or www.[employer]  · Call the toll free National Suicide Prevention Hotlines   · National Suicide Prevention Lifeline 628-524-AVTJ (6142)  · National Hope Line Network 800-SUICIDE (036-0106)

## 2021-03-10 NOTE — OR NURSING
@1037 Pt arrived to Phase 2. Pt has complaints of 7/10 pain. Pt states this is tolerable. Dressing site noted to have minimal drainage. Pt mobilized from gurney to chair. Vital signs stable. Pts Mom brought back to discharge area. Belongings brought to patients bedside.    @1047 Stimulator reps to pts bedside for education. Discharge instructions discussed with patient and patients mom at bedside. Pt and pts mom verbalize understanding.    @1110 PIV removed. PT assisted with getting dressed by pts mom.    @1114 Pt discharged to home with all belongings.

## 2021-03-10 NOTE — ANESTHESIA PREPROCEDURE EVALUATION
Relevant Problems   PULMONARY   (+) Mild intermittent asthma without complication      NEURO   (+) Seizure (HCC)      CARDIAC   (+) SVT (supraventricular tachycardia) (HCC)      GI   (+) GERD (gastroesophageal reflux disease)         (+) Fatty liver disease, nonalcoholic      ENDO   (+) Diabetes mellitus type 2 in obese (HCC)   (+) Hypothyroidism       Physical Exam    Airway   Mallampati: III  TM distance: >3 FB  Neck ROM: full       Cardiovascular - normal exam  Rhythm: regular  Rate: normal  (-) murmur     Dental - normal exam           Pulmonary - normal exam  Breath sounds clear to auscultation     Abdominal   (+) obese     Neurological - normal exam                 Anesthesia Plan    ASA 3   ASA physical status 3 criteria: diabetes - poorly controlled, hypertension - poorly controlled, COPD, active hepatitis and MI or angina - history (> 3 months)    Plan - general       Airway plan will be ETT          Induction: intravenous    Postoperative Plan: Postoperative administration of opioids is intended.    Pertinent diagnostic labs and testing reviewed    Informed Consent:    Anesthetic plan and risks discussed with patient.    Use of blood products discussed with: patient whom consented to blood products.

## 2021-03-10 NOTE — OR NURSING
Neuro stim Rep Katrin  here to trial device.  VSS. celso po well. without nausea. To transfer to stage 2 when ready

## 2021-03-11 ENCOUNTER — TELEMEDICINE (OUTPATIENT)
Dept: BEHAVIORAL HEALTH | Facility: CLINIC | Age: 31
End: 2021-03-11
Payer: MEDICARE

## 2021-03-11 VITALS — WEIGHT: 225 LBS | HEIGHT: 65 IN | BODY MASS INDEX: 37.49 KG/M2

## 2021-03-11 DIAGNOSIS — F60.3 BORDERLINE PERSONALITY DISORDER IN ADULT (HCC): ICD-10-CM

## 2021-03-11 DIAGNOSIS — F43.10 POSTTRAUMATIC STRESS DISORDER: ICD-10-CM

## 2021-03-11 DIAGNOSIS — F25.1 SCHIZOAFFECTIVE DISORDER, DEPRESSIVE TYPE (HCC): ICD-10-CM

## 2021-03-11 PROCEDURE — 99214 OFFICE O/P EST MOD 30 MIN: CPT | Mod: 95,CR | Performed by: PSYCHIATRY & NEUROLOGY

## 2021-03-11 RX ORDER — OXCARBAZEPINE 300 MG/1
300 TABLET, FILM COATED ORAL 2 TIMES DAILY
Qty: 180 TABLET | Refills: 0 | Status: SHIPPED | OUTPATIENT
Start: 2021-03-11 | End: 2021-06-03

## 2021-03-11 RX ORDER — BUSPIRONE HYDROCHLORIDE 30 MG/1
TABLET ORAL
Qty: 180 TABLET | Refills: 0 | Status: ON HOLD | OUTPATIENT
Start: 2021-03-11 | End: 2021-04-28

## 2021-03-11 RX ORDER — ZIPRASIDONE HYDROCHLORIDE 40 MG/1
CAPSULE ORAL
Qty: 180 CAPSULE | Refills: 0 | Status: SHIPPED | OUTPATIENT
Start: 2021-03-11 | End: 2021-06-03

## 2021-03-11 RX ORDER — FLUOXETINE HYDROCHLORIDE 40 MG/1
40 CAPSULE ORAL DAILY
Qty: 90 CAPSULE | Refills: 0 | Status: SHIPPED | OUTPATIENT
Start: 2021-03-11 | End: 2021-06-02

## 2021-03-11 RX ORDER — TRAZODONE HYDROCHLORIDE 100 MG/1
100 TABLET ORAL
Qty: 90 TABLET | Refills: 0 | Status: ON HOLD | OUTPATIENT
Start: 2021-03-11 | End: 2021-04-28

## 2021-03-11 ASSESSMENT — FIBROSIS 4 INDEX: FIB4 SCORE: 0.39

## 2021-03-11 NOTE — PROGRESS NOTES
"[facility]  PSYCHIATRIC FOLLOW-UP NOTE    This evaluation was conducted via Zoom, using secure and encrypted videoconferencing technology. The patient was physical located at their home address in [place], [place], and the physician was located at [facility] in [place], [place]. The patient was presented by self, at home. The patient’s identity was confirmed and verbal consent for the telemedicine encounter was obtained.    CC:  Presents for follow up visit for medication evaluation and management        History Of Present Illness:  Kristin Balderrama is a 29 y.o. female, single, lives with her grandmother, with a documented history of schizoaffective disorder, personality disorder, and insomnia, the patient states she has schizophrenia and multiple personality disorder, with significant comorbid medical problems requiring frequent hospitalizations, presents today for follow up.  She is a former patient of  Dr. Burnette, who retired.      The patient reported that she is recovering from surgery to replace her neuro stimulator for her bowel and bladder incontinence.  She says she has continued to make frequent trips to the ED for different things.  She likes the addition of the Buspirone and is at 15 mg BID and is interested in increasing it to 30 mg BID, educated her to increase it by 5 mg every week.  Her sleep has been good.  She has started in therapy with Brie Baxter at this clinic and really likes her.  They have set goals.     History from 1/7/20 visit:  \"The patient reported she is currently at a nursing facility and has been there for 1 week following a hospitalization where she fell and hit her head and she says that her legs have not been working.  She reports that a spinal tap was done and showed that she had elevated intracranial pressure.  She says she is required to be at this facility to do rehabilitation.  She continues to feel extreme " "weakness in her legs and cannot walk unassisted.  Her roommate plays the TV all day and all night and so the patient says she cannot stay in the room and has difficulty sleeping.  Also she reports that several of her medications have been changed due to \"polypharmacy \"and this has affected how she has been feeling.  For example, her trazodone was reduced from 100 mg a night to 25 mg at bedtime.  However her Prozac was increased from 20 mg to 40 milligrams.  Modafinil was added for excessive daytime sleepiness but the patient states she has not noticed any improvement.  Trileptal 150 mg was added and the patient does believe this is helping her.    The patient endorses hallucinations, visual, seeing shadows and auditory hallucination \"whispers.\"  She says the whispers are like white noise that she cannot tell how many voices or whether they are male or female.  Regarding her multiple personality disorder, the patient says she just switches personality, and that she is unaware of doing so, and so is not sure what other people experience when her personality changes, that they just tell her that her personality has changed.\"      Psychiatric History:  Several hospitalization at [facility].  At least one suicide attempt by taking a whole bottle of her Seroquel; however, she says she was not hospitalized for the same.  Saw Dr. Burnette for approximately 6 years  Denies participating very often in individual therapy and states that she \"hates\" group therapy.    Family History:  Mother and sister with \"multiple personality disorder \"    Social History:  Homeschooled, obtained high school diploma, was \"in and out of school in special education,\" \"could not socialize so I was homeschooled.\"  She says she was placed in special education because she had problems with some subjects, her best subject with history.  She has worked at fast [employer] and [employer] and a [employer] but \"no one wants me because of my " "multiple personalities.\"  She has been on disability since 2007 for mental health and for back problems.     Depression screening:  Depression Screen (PHQ-2/PHQ-9) 2/5/2021 2/5/2021 2/6/2021   PHQ-2 Total Score - - -   PHQ-2 Total Score - 1 1   PHQ-2 Total Score - - -   PHQ-2 Total Score 0 - -   PHQ-9 Total Score - - -   PHQ-9 Total Score - 2 2   PHQ-9 Total Score - - -   PHQ-9 Total Score - - -       Interpretation of PHQ-9 Total Score   Score Severity   1-4 No Depression   5-9 Mild Depression   10-14 Moderate Depression   15-19 Moderately Severe Depression   20-27 Severe Depression    Past Medical, Family and Social History reviewed with the patient.    Current medications and allergies reviewed with the patient.    REVIEW OF SYSTEMS:        Constitutional negative   Eyes negative   Ears/Nose/Mouth/Throat negative   Cardiovascular negative   Respiratory negative   Gastrointestinal negative   Genitourinary negative   Muscular negative   Integumentary negative   Neurological negative   Endocrine negative   Hematologic/Lymphatic negative       Physical Examination and Psychiatric Mental Status Examination:  Vital signs: Ht 1.651 m (5' 5\")   Wt 102 kg (225 lb)   BMI 37.44 kg/m²     CONSTITUTIONAL:  General Appearance:  Clean, casual attire, poor eye contact - eyes closed most of visit, engaged with provider    MUSCULOSKELETAL:  Muscle Strength and Tone:  normal, no atrophy, no abnormal movements  Gait and Station:  normal gait, walks without assistance    ORIENTATION:  Oriented to time, place and person  RECENT AND REMOTE MEMORY:  Grossly intact  ATTENTION SPAN AND CONCENTRATION:  within normal range  LANGUAGE:  no deficits appreciated  FUND OF KNOWLEDGE:  has awareness of current events, past history and normal vocabulary  SPEECH:  normal volume, amount, rate and articulation, no perseveration or paucity of language  MOOD:   Neutral  AFFECT:  Constricted  THOUGHT PROCESS:  logical and goal directed  THOUGHT " "CONTENT:  Denies any SI/HI or AVH, no delusional thinking nor preoccupations appreciated  ASSOCIATIONS:  Intact, not loose, no tangentiality or circumstantiality  MEMORY:  No gross evidence of memory deficits  JUDGMENT:  adequate concerning everyday activities  INSIGHT:  adequate to psychiatric condition        DIAGNOSTIC IMPRESSION:  There are no diagnoses linked to this encounter.   Asssessment and Plan:  Risk of suicide is assessed to be low.  Will be mindful that she has prolonged QTc and to not increase the dose of geodon further  1. Schizoaffective disorder, depressed type.  2. Personality disorder.  3. 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  Continue increased dose of Trazodone from 50 mg to 100 mg,   Continue Melatonin     4.  Continue f/u of medical problems as directed     5. Follow up in 3 months or call sooner PRN    Risks, benefits, alternatives and side effects were discussed for all medicines prescribed at this visit.  The patient voiced understanding.  The patient agrees to call the clinic with any questions or concerns, or seek emergent medical care if warranted.    The proposed treatment plan was discussed with the patient who was provided the opportunity to ask questions and make suggestions regarding alternative treatment. The patient verbalized understanding and expressed agreement with the plan.      La Lutz, " M.D.      This note was created using voice recognition software (Dragon). The accuracy of the dictation is limited by the abilities of the software. I have reviewed the note prior to signing, however some errors in grammar and context are still possible. If you have any questions related to this note please do not hesitate to contact our office.

## 2021-03-11 NOTE — ANESTHESIA POSTPROCEDURE EVALUATION
Patient: Kristin Balderrama    Procedure Summary     Date: 03/10/21 Room / Location: [place] [address] / SURGERY [facility]    Anesthesia Start: 0822 Anesthesia Stop: 0935    Procedure: INSERTION, ELECTRODE LEADS AND PULSE GENERATOR, NEUROSTIMULATOR, SACRAL - REMOVAL OF INTERSTIM WITH REPLACEMENT OF SACRAL NEUROMODULATION DEVICE (Back) Diagnosis: (FECAL AND URINARY INCONTINENCE)    Surgeons: Joe Noyola M.D. Responsible Provider: Wesley Jensen M.D.    Anesthesia Type: general ASA Status: 3          Final Anesthesia Type: general  Last vitals  BP   Blood Pressure: 114/70    Temp   36.2 °C (97.2 °F)    Pulse   88   Resp   16    SpO2   94 %      Anesthesia Post Evaluation    Patient location during evaluation: PACU  Patient participation: complete - patient participated  Level of consciousness: awake and alert  Pain score: 7    Airway patency: patent  Anesthetic complications: no  Cardiovascular status: hemodynamically stable  Respiratory status: acceptable  Hydration status: euvolemic    PONV: none          No complications documented.     Nurse Pain Score: 7 (NPRS)

## 2021-03-11 NOTE — ANESTHESIA TIME REPORT
Anesthesia Start and Stop Event Times     Date Time Event    3/10/2021 0809 Ready for Procedure     0822 Anesthesia Start     0935 Anesthesia Stop        Responsible Staff  03/10/21    Name Role Begin End    Wesley Jensen M.D. Anesth 0822 0935        Preop Diagnosis (Free Text):  Pre-op Diagnosis     FECAL AND URINARY INCONTINENCE        Preop Diagnosis (Codes):    Post op Diagnosis  Urinary and fecal incontinence      Premium Reason  Non-Premium    Comments:

## 2021-03-15 ENCOUNTER — NURSE TRIAGE (OUTPATIENT)
Dept: HEALTH INFORMATION MANAGEMENT | Facility: OTHER | Age: 31
End: 2021-03-15

## 2021-03-15 ENCOUNTER — TELEPHONE (OUTPATIENT)
Dept: CARDIOLOGY | Facility: MEDICAL CENTER | Age: 31
End: 2021-03-15

## 2021-03-15 NOTE — TELEPHONE ENCOUNTER
"Pt states she has been having an infection for 4 weeks, has been on antibiotics. Pt states she had an abcessed tooth that was removed last Thursday, had a low grade fever 99.6f. Achy bones yesterday, sob.  Pt had surgery last Friday. Yesterday pt started having sob and bone pain.    Reason for Disposition  • MODERATE difficulty breathing (e.g., speaks in phrases, SOB even at rest, pulse 100-120) of new onset or worse than normal    Additional Information  • Negative: Breathing stopped and hasn't returned  • Negative: Choking on something  • Negative: SEVERE difficulty breathing (e.g., struggling for each breath, speaks in single words, pulse > 120)  • Negative: Bluish (or gray) lips or face  • Negative: Difficult to awaken or acting confused (e.g., disoriented, slurred speech)  • Negative: Passed out (i.e., fainted, collapsed and was not responding)  • Negative: Wheezing started suddenly after medicine, an allergic food, or bee sting  • Negative: Stridor  • Negative: Slow, shallow and weak breathing  • Negative: Sounds like a life-threatening emergency to the triager  • Negative: Chest pain  • Negative: Wheezing (high pitched whistling sound) and previous asthma attacks or use of asthma medicines  • Negative: Difficulty breathing and only present when coughing  • Negative: Difficulty breathing and only from stuffy or runny nose    Answer Assessment - Initial Assessment Questions  1. RESPIRATORY STATUS: \"Describe your breathing?\" (e.g., wheezing, shortness of breath, unable to speak, severe coughing)       Sob  2. ONSET: \"When did this breathing problem begin?\"       yesterday  3. PATTERN \"Does the difficult breathing come and go, or has it been constant since it started?\"       constant  4. SEVERITY: \"How bad is your breathing?\" (e.g., mild, moderate, severe)     - MILD: No SOB at rest, mild SOB with walking, speaks normally in sentences, can lay down, no retractions, pulse < 100.     - MODERATE: SOB at rest, SOB " "with minimal exertion and prefers to sit, cannot lie down flat, speaks in phrases, mild retractions, audible wheezing, pulse 100-120.     - SEVERE: Very SOB at rest, speaks in single words, struggling to breathe, sitting hunched forward, retractions, pulse > 120       severe  5. RECURRENT SYMPTOM: \"Have you had difficulty breathing before?\" If so, ask: \"When was the last time?\" and \"What happened that time?\"       Only when she's been sick  6. CARDIAC HISTORY: \"Do you have any history of heart disease?\" (e.g., heart attack, angina, bypass surgery, angioplasty)       Heart ablation  7. LUNG HISTORY: \"Do you have any history of lung disease?\"  (e.g., pulmonary embolus, asthma, emphysema)      Asthma, COPD  8. CAUSE: \"What do you think is causing the breathing problem?\"       infection  9. OTHER SYMPTOMS: \"Do you have any other symptoms? (e.g., dizziness, runny nose, cough, chest pain, fever)     Dizziness, cough, hard to swallow  10. PREGNANCY: \"Is there any chance you are pregnant?\" \"When was your last menstrual period?\"        no  11. TRAVEL: \"Have you traveled out of the country in the last month?\" (e.g., travel history, exposures)        no    Protocols used: BREATHING DIFFICULTY-A-OH      "

## 2021-03-15 NOTE — TELEPHONE ENCOUNTER
KRYSTIAN    NM: Kristin Balderrama    PH: [phone]   ALT PH: NO   PT NM: Kristin Balderrama    : [birth year]   REG DR: Dr Leon   RE: Her heart rate is at  47 just  wanting to let her Dr know    DISP HIST: 03/15/2021 12:20P SM p/disp  03/15/2021 12:22P [facility] chkd/faxed      Thank you,    Lynnette CORRALES

## 2021-03-16 ENCOUNTER — HOSPITAL ENCOUNTER (EMERGENCY)
Facility: MEDICAL CENTER | Age: 31
End: 2021-03-16
Attending: EMERGENCY MEDICINE
Payer: MEDICARE

## 2021-03-16 VITALS
RESPIRATION RATE: 16 BRPM | OXYGEN SATURATION: 97 % | HEART RATE: 68 BPM | SYSTOLIC BLOOD PRESSURE: 136 MMHG | BODY MASS INDEX: 40.04 KG/M2 | WEIGHT: 240.3 LBS | HEIGHT: 65 IN | TEMPERATURE: 96.7 F | DIASTOLIC BLOOD PRESSURE: 91 MMHG

## 2021-03-16 DIAGNOSIS — K04.7 DENTAL INFECTION: ICD-10-CM

## 2021-03-16 LAB
ALBUMIN SERPL BCP-MCNC: 4 G/DL (ref 3.2–4.9)
ALBUMIN/GLOB SERPL: 1.8 G/DL
ALP SERPL-CCNC: 58 U/L (ref 30–99)
ALT SERPL-CCNC: 35 U/L (ref 2–50)
ANION GAP SERPL CALC-SCNC: 9 MMOL/L (ref 7–16)
AST SERPL-CCNC: 12 U/L (ref 12–45)
BASOPHILS # BLD AUTO: 0.2 % (ref 0–1.8)
BASOPHILS # BLD: 0.01 K/UL (ref 0–0.12)
BILIRUB SERPL-MCNC: 0.4 MG/DL (ref 0.1–1.5)
BUN SERPL-MCNC: 17 MG/DL (ref 8–22)
CALCIUM SERPL-MCNC: 8.9 MG/DL (ref 8.5–10.5)
CHLORIDE SERPL-SCNC: 109 MMOL/L (ref 96–112)
CO2 SERPL-SCNC: 17 MMOL/L (ref 20–33)
CREAT SERPL-MCNC: 0.67 MG/DL (ref 0.5–1.4)
CRP SERPL HS-MCNC: 4.01 MG/DL (ref 0–0.75)
EOSINOPHIL # BLD AUTO: 0.19 K/UL (ref 0–0.51)
EOSINOPHIL NFR BLD: 4.7 % (ref 0–6.9)
ERYTHROCYTE [DISTWIDTH] IN BLOOD BY AUTOMATED COUNT: 47.1 FL (ref 35.9–50)
GLOBULIN SER CALC-MCNC: 2.2 G/DL (ref 1.9–3.5)
GLUCOSE SERPL-MCNC: 109 MG/DL (ref 65–99)
HCT VFR BLD AUTO: 36.3 % (ref 37–47)
HGB BLD-MCNC: 11.2 G/DL (ref 12–16)
IMM GRANULOCYTES # BLD AUTO: 0.02 K/UL (ref 0–0.11)
IMM GRANULOCYTES NFR BLD AUTO: 0.5 % (ref 0–0.9)
LACTATE BLD-SCNC: 1.6 MMOL/L (ref 0.5–2)
LYMPHOCYTES # BLD AUTO: 1.31 K/UL (ref 1–4.8)
LYMPHOCYTES NFR BLD: 32.7 % (ref 22–41)
MCH RBC QN AUTO: 28.5 PG (ref 27–33)
MCHC RBC AUTO-ENTMCNC: 30.9 G/DL (ref 33.6–35)
MCV RBC AUTO: 92.4 FL (ref 81.4–97.8)
MONOCYTES # BLD AUTO: 0.33 K/UL (ref 0–0.85)
MONOCYTES NFR BLD AUTO: 8.2 % (ref 0–13.4)
NEUTROPHILS # BLD AUTO: 2.15 K/UL (ref 2–7.15)
NEUTROPHILS NFR BLD: 53.7 % (ref 44–72)
NRBC # BLD AUTO: 0 K/UL
NRBC BLD-RTO: 0 /100 WBC
PLATELET # BLD AUTO: 143 K/UL (ref 164–446)
PMV BLD AUTO: 12.9 FL (ref 9–12.9)
POTASSIUM SERPL-SCNC: 3.8 MMOL/L (ref 3.6–5.5)
PROT SERPL-MCNC: 6.2 G/DL (ref 6–8.2)
RBC # BLD AUTO: 3.93 M/UL (ref 4.2–5.4)
SODIUM SERPL-SCNC: 135 MMOL/L (ref 135–145)
WBC # BLD AUTO: 4 K/UL (ref 4.8–10.8)

## 2021-03-16 PROCEDURE — 83605 ASSAY OF LACTIC ACID: CPT

## 2021-03-16 PROCEDURE — 99284 EMERGENCY DEPT VISIT MOD MDM: CPT

## 2021-03-16 PROCEDURE — 85025 COMPLETE CBC W/AUTO DIFF WBC: CPT

## 2021-03-16 PROCEDURE — A9270 NON-COVERED ITEM OR SERVICE: HCPCS | Performed by: EMERGENCY MEDICINE

## 2021-03-16 PROCEDURE — 700102 HCHG RX REV CODE 250 W/ 637 OVERRIDE(OP): Performed by: EMERGENCY MEDICINE

## 2021-03-16 PROCEDURE — 80053 COMPREHEN METABOLIC PANEL: CPT

## 2021-03-16 PROCEDURE — 86140 C-REACTIVE PROTEIN: CPT

## 2021-03-16 RX ORDER — PENICILLIN V POTASSIUM 500 MG/1
500 TABLET ORAL 3 TIMES DAILY
Qty: 21 TABLET | Refills: 0 | Status: SHIPPED | OUTPATIENT
Start: 2021-03-16 | End: 2021-03-17

## 2021-03-16 RX ORDER — DIPHENHYDRAMINE HCL 25 MG
50 CAPSULE ORAL EVERY 6 HOURS PRN
Qty: 30 CAPSULE | Refills: 0 | Status: SHIPPED | OUTPATIENT
Start: 2021-03-16 | End: 2021-03-17

## 2021-03-16 RX ORDER — DIPHENHYDRAMINE HCL 25 MG
50 TABLET ORAL ONCE
Status: COMPLETED | OUTPATIENT
Start: 2021-03-16 | End: 2021-03-16

## 2021-03-16 RX ORDER — PENICILLIN V POTASSIUM 500 MG/1
500 TABLET ORAL ONCE
Status: COMPLETED | OUTPATIENT
Start: 2021-03-16 | End: 2021-03-16

## 2021-03-16 RX ADMIN — PENICILLIN V POTASSIUM 500 MG: 500 TABLET, FILM COATED ORAL at 03:22

## 2021-03-16 RX ADMIN — DIPHENHYDRAMINE HYDROCHLORIDE 50 MG: 25 TABLET ORAL at 03:22

## 2021-03-16 ASSESSMENT — FIBROSIS 4 INDEX: FIB4 SCORE: 0.39

## 2021-03-16 ASSESSMENT — PAIN DESCRIPTION - DESCRIPTORS: DESCRIPTORS: SHARP;STABBING

## 2021-03-16 NOTE — ED PROVIDER NOTES
"ED Provider Note    CHIEF COMPLAINT  Chief Complaint   Patient presents with   • Neck Pain   • Neck Swelling       HPI  Kristin Balderrama is a 30 y.o. female who presents for evaluation of right facial swelling.  Patient notes the swelling has been happening for 3 weeks but she feels it is worsening despite her recent dental extraction on Friday.  She notes she is having painful swallowing but has been tolerating her own secretions.  Her voice is normal and she has been able to turn her head without distress.  She notes having a temperature of 99.6 which she feels is \"roasting\" compared to her baseline temperature.  She also notes on her smart phone that her heart rate was in the 40s and 50s this morning.  She has felt nauseous but has not vomited.  She feels like her oral intake has suffered and she may be dehydrated.      REVIEW OF SYSTEMS  Constitutional: Elevated body temperature measured at home  Skin: No rashes, abrasions, lacerations, or pruritus  HEENT: No  runny nose, sores, or trouble speaking.  Neck: Right-sided upper neck pain, no stiffness or masses  Pulm: No shortness of breath, cough, wheezing, stridor, or pain with inspiration/expiration  Gastrointestinal: No vomiting, diarrhea, constipation, bloating, melena, hematochezia or abdominal pain.  Genitourinary: No dysuria or hematuria  Musculoskeletal: No pain, swelling, weakness  Neurologic: No sensory or motor changes. No confusion or disorientation.  Heme: No bleeding or bruising problems.   Immuno: No hx of recurrent infections      PAST MEDICAL HISTORY   has a past medical history of Abdominal pain, Anginal syndrome, Apnea, sleep, Arrhythmia, Arthritis, ASTHMA, Atrial fibrillation ([facility]), Back pain, Borderline personality disorder ([facility]), Breath shortness, Bronchitis, Cardiac arrhythmia, Chickenpox, Chronic UTI (9/18/2010), COPD (chronic obstructive pulmonary disease) ([facility]), Cough, Daytime sleepiness, Dental disorder (03/08/2021), Depression, " Diabetes (HCC), Diarrhea, Disorder of thyroid, Fall, Fatigue, Frequent headaches, Gasping for breath, Gynecological disorder, Headache(784.0), Heart burn, Heart murmur, History of falling, Indigestion, Migraine, Mitochondrial disease (HCC), 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HCC), Nausea, Obesity, Other fatigue (6/29/2020), Pain (08-15-12), Painful joint, PCOS (polycystic ovarian syndrome), Pneumonia (2012 12/2020), Psychosis ([facility]), Ringing in ears, Scoliosis, Shortness of breath, Sinus tachycardia (10/31/2013), Sleep apnea, Snoring, Tonsillitis, 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[facility]), Tuberculosis, Urinary bladder disorder, Urinary incontinence, Weakness, and Wears glasses.    SOCIAL HISTORY  Social History     Tobacco Use   • Smoking status: Never Smoker   • Smokeless tobacco: Never Used   Substance and Sexual Activity   • Alcohol use: No     Alcohol/week: 0.0 oz   • Drug use: Not Currently     Frequency: 7.0 times per week     Types: Marijuana   • Sexual activity: Not Currently     Birth control/protection: Pill       SURGICAL HISTORY   has a past surgical history that includes neuro dest facet l/s w/ig sngl (4/21/2015); recovery (1/27/2016); [place] by laparoscopy (8/29/2010); lumbar fusion anterior (8/21/2012); other cardiac surgery (1/2016); tonsillectomy; bowel stimulator insertion (7/13/2016); gastroscopy with balloon dilatation (N/A, 1/4/2017); muscle biopsy (Right, 1/26/2017); other abdominal surgery; laminotomy; and bowel stimulator insertion (3/10/2021).    CURRENT MEDICATIONS  Home Medications     Reviewed by Nila Morrison R.N. (Registered Nurse) on 03/16/21 at 0032  Med List Status: Partial   Medication Last Dose Status   acetaminophen (TYLENOL) 500 MG Tab  Active   albuterol 108 (90 Base) MCG/ACT Aero Soln inhalation aerosol  Active   aspirin 81 MG EC tablet  Active   baclofen (LIORESAL) 10 MG Tab  Active   busPIRone (BUSPAR) 30 MG tablet  Active   calcium carbonate (TUMS EX) 750 MG chewable tablet  Active  "  diphenhydrAMINE (BENADRYL ALLERGY) 25 MG capsule  Active   Dulaglutide (TRULICITY) 1.5 MG/0.5ML Solution Pen-injector  Active   esomeprazole (NEXIUM) 40 MG delayed-release capsule  Active   fluoxetine (PROZAC) 40 MG capsule  Active   ipratropium-albuterol (DUONEB) 0.5-2.5 (3) MG/3ML nebulizer solution  Active   ivabradine (CORLANOR) 7.5 MG Tab tablet  Active   levothyroxine (SYNTHROID) 100 MCG Tab  Active   Melatonin 10 MG Tab  Active   Mirabegron ER (MYRBETRIQ) 50 MG TABLET SR 24 HR  Active   mycophenolate (CELLCEPT) 500 MG tablet  Active   ondansetron (ZOFRAN ODT) 4 MG TABLET DISPERSIBLE  Active   OXcarbazepine (TRILEPTAL) 300 MG Tab  Active   oxybutynin (DITROPAN) 5 MG Tab  Active   penicillin v potassium (VEETID) 500 MG Tab  Active   potassium chloride SA (KDUR) 20 MEQ Tab CR  Active   predniSONE (DELTASONE) 10 MG Tab  Active   pregabalin (LYRICA) 300 MG capsule  Active   sodium bicarbonate 325 MG Tab  Active   topiramate (TOPAMAX) 50 MG tablet  Active   traZODone (DESYREL) 100 MG Tab  Active   vitamin D, Ergocalciferol, (DRISDOL) 1.25 MG (41773 UT) Cap capsule  Active   ziprasidone (GEODON) 40 MG Cap  Active                ALLERGIES  Allergies   Allergen Reactions   • Depakote [Divalproex Sodium] Unspecified     Muscle spasms/muscle pain and weakness     • Amitriptyline Unspecified     Headaches     • Aripiprazole [Abilify] Unspecified     Headaches/muscle twitching     • Clindamycin Nausea     Even with food     • Flagyl [Metronidazole Hcl] Unspecified     \"eye problems\"     • Flomax [Tamsulosin Hydrochloride] Swelling   • Levaquin Unspecified     Severe muscle cramps in legs  RXN=unknown   • Metformin Unspecified     Increased lactic acid      • Tamsulosin Swelling     Swelling of legs   • Tape Rash     Tears skin off  coban with Tegaderm tape ok intermittently  RXN=ongoing   • Vancomycin Itching     Pt becomes flushed in face and chest.   RXN=7/10/16   • Wound Dressing Adhesive Hives     By pt report   • " "Ampicillin Rash     Pt reports that she received a rash    • Ciprofloxacin Rash         • Keflex Rash     Pt states she gets a rash with this medication  Tolerates ceftriaxone  Can take with Benadryl   • Levofloxacin Unspecified     Leg muscle cramps   • Metronidazole Rash     \"Vision problems\"   • Penicillins Hives     Can take with Benadryl   • Sulfa Drugs Itching and Myalgia     Muscle pain and weakness   • Valproic Acid Rash     .       PHYSICAL EXAM  VITAL SIGNS: /57   Pulse 71   Temp 36.6 °C (97.9 °F) (Oral)   Resp 18   Ht 1.651 m (5' 5\")   Wt 109 kg (240 lb 4.8 oz)   SpO2 94%   BMI 39.99 kg/m²    Gen: Alert, mildly anxious  HEENT: No signs of trauma, Bilateral external ears normal, Nose normal. Conjunctiva normal, Non-icteric.  Tacky oral mucous membranes, no posterior pharynx erythema, petechiae, or lesions noted.  No asymmetry or uvula deviation.  Patient appears to be phonating and tolerating her own secretions without difficulty.    Neck:  No significant tenderness, Supple, No masses  Lymphatic: No cervical lymphadenopathy noted.   Cardiovascular: Regular rate and rhythm, no murmurs.  Capillary refill less than 3 seconds to all extremities, 2+ distal pulses.  Thorax & Lungs: Normal breath sounds, No respiratory distress, No wheezing bilateral chest rise  Abdomen: Bowel sounds normal, Soft, No tenderness  Skin: Warm, Dry, No erythema, No rash noted to exposed areas.   Extremities: Intact distal pulses, No edema  Neurologic: Alert , no facial droop, grossly normal coordination and strength  Psychiatric: Affect pleasant      LABS  Results for orders placed or performed during the hospital encounter of 03/16/21   CBC WITH DIFFERENTIAL   Result Value Ref Range    WBC 4.0 (L) 4.8 - 10.8 K/uL    RBC 3.93 (L) 4.20 - 5.40 M/uL    Hemoglobin 11.2 (L) 12.0 - 16.0 g/dL    Hematocrit 36.3 (L) 37.0 - 47.0 %    MCV 92.4 81.4 - 97.8 fL    MCH 28.5 27.0 - 33.0 pg    MCHC 30.9 (L) 33.6 - 35.0 g/dL    RDW 47.1 " 35.9 - 50.0 fL    Platelet Count 143 (L) 164 - 446 K/uL    MPV 12.9 9.0 - 12.9 fL    Neutrophils-Polys 53.70 44.00 - 72.00 %    Lymphocytes 32.70 22.00 - 41.00 %    Monocytes 8.20 0.00 - 13.40 %    Eosinophils 4.70 0.00 - 6.90 %    Basophils 0.20 0.00 - 1.80 %    Immature Granulocytes 0.50 0.00 - 0.90 %    Nucleated RBC 0.00 /100 WBC    Neutrophils (Absolute) 2.15 2.00 - 7.15 K/uL    Lymphs (Absolute) 1.31 1.00 - 4.80 K/uL    Monos (Absolute) 0.33 0.00 - 0.85 K/uL    Eos (Absolute) 0.19 0.00 - 0.51 K/uL    Baso (Absolute) 0.01 0.00 - 0.12 K/uL    Immature Granulocytes (abs) 0.02 0.00 - 0.11 K/uL    NRBC (Absolute) 0.00 K/uL   COMP METABOLIC PANEL   Result Value Ref Range    Sodium 135 135 - 145 mmol/L    Potassium 3.8 3.6 - 5.5 mmol/L    Chloride 109 96 - 112 mmol/L    Co2 17 (L) 20 - 33 mmol/L    Anion Gap 9.0 7.0 - 16.0    Glucose 109 (H) 65 - 99 mg/dL    Bun 17 8 - 22 mg/dL    Creatinine 0.67 0.50 - 1.40 mg/dL    Calcium 8.9 8.5 - 10.5 mg/dL    AST(SGOT) 12 12 - 45 U/L    ALT(SGPT) 35 2 - 50 U/L    Alkaline Phosphatase 58 30 - 99 U/L    Total Bilirubin 0.4 0.1 - 1.5 mg/dL    Albumin 4.0 3.2 - 4.9 g/dL    Total Protein 6.2 6.0 - 8.2 g/dL    Globulin 2.2 1.9 - 3.5 g/dL    A-G Ratio 1.8 g/dL   CRP QUANTITIVE (NON-CARDIAC)   Result Value Ref Range    Stat C-Reactive Protein 4.01 (H) 0.00 - 0.75 mg/dL   LACTIC ACID   Result Value Ref Range    Lactic Acid 1.6 0.5 - 2.0 mmol/L   ESTIMATED GFR   Result Value Ref Range    GFR If African American >60 >60 mL/min/1.73 m 2    GFR If Non African American >60 >60 mL/min/1.73 m 2     *Note: Due to a large number of results and/or encounters for the requested time period, some results have not been displayed. A complete set of results can be found in Results Review.     COURSE & MEDICAL DECISION MAKING  Patient arrives for evaluation of subjective swelling and pain to the right side of her face and upper portion of her neck.  These are too subtle for me to notice on exam in terms  "of asymmetry from one side of her face and neck to the other however she does have tenderness in the area of her recent extraction of the right posterior maxillary region.  The socket itself seems well-healed however.  There is no gingival erythema or fluctuance noted.  She notes she feels like her secretions are getting \"stuck\" however she is not drooling and appears to swallow without distress.  I discussed options with the patient and I feel getting a laboratory evaluation to look at markers of inflammation as well as her white blood cell count is reasonable.  As she is not vomiting I feel she is able to orally rehydrate and will attempt that here in the emergency department.  At this point, I do not feel CT is in the patient's best interest given the shocking amount of CT imaging she has obtained within the past few years.  I feel it would be very unlikely to demonstrate any acute or emergent pathology requiring intervention and would subject the patient to the ill effects of more ionizing radiation as well as possible complications from contrast.  She did state understanding of this.    Patient's labs are reassuring although it is noted she has mild neutropenia and an elevated CRP.  I do not suspect her neutropenia is related to sepsis and, on reevaluation, patient appeared calm and comfortable.  She was phonating and tolerating her own secretions and I did not feel imaging was necessary.  I discussed options with patient and I feel is reasonable to treat her empirically for dental infection given the CRP and her symptoms however the complication is her list of allergies.  Her reaction to penicillin was some form of rash but she states she has been recently on that and tolerated well if she took it with Benadryl.  Given this, I feel it is reasonable to give her a prescription for 7 days of penicillin with Benadryl concurrently if she needs it.  She states understanding of this and is comfortable with this plan. "  She will return if her symptoms worsen or change in otherwise follow-up with her dentist.    FINAL IMPRESSION  1. Dental infection        Electronically signed by: Jem Saha M.D., 3/16/2021 1:12 AM

## 2021-03-16 NOTE — TELEPHONE ENCOUNTER
"Called patient back.   Asked about her HR of 47, she state she confirmed on an rocio on her phone.  Symptoms were chest pain unknown if radiated anywhere  just severe pain, and shortness of breath.  HR currently 65, still having slight chest pain.  Upon chart review, she Was in the ER yesterday she stated, but in chart it looks like this morning, she has since been discharged.  I asked if she was given anything for her chest pain and she stated \"I didn't tell them, they had enough to worry about\".  I advised if she is still having chest pain she needs to go to the ER, she verbalized understanding.     Routed to  for further recommendations.   "

## 2021-03-16 NOTE — ED NOTES
Discharge teaching and paperwork provided. All questions/concerns answered. VSS, assessment stable. Patient discharged to the care of mom and ambulated out of the ED via walker.

## 2021-03-16 NOTE — TELEPHONE ENCOUNTER
"Called and spoke to patient mother and discussed her symptoms, advised with her continuing chest pain she needs to be evaluated in the ER, she verbalized understanding.  Her mom stated \"she was in the ER this morning and her heart was fine\"  Also discussed that she needs to have an appt this week for assessment of bradycardia and chest pain, patient was scheduled for tomorrow with RT. Answered all questions and concerns.   "

## 2021-03-16 NOTE — ED TRIAGE NOTES
"Chief Complaint   Patient presents with   • Neck Pain   • Neck Swelling     Patient bib remsa to triage. Patient states that she has had Right sided neck pain and swelling for several weeks. She then required a tooth removal. Pain and swelling have not improved. Patient says that she is taking oxycodone and keflex with no relief. /77   Pulse 75   Temp 36.6 °C (97.9 °F) (Oral)   Resp 18   Ht 1.651 m (5' 5\")   Wt 109 kg (240 lb 4.8 oz)   SpO2 100%   BMI 39.99 kg/m²     "

## 2021-03-17 ENCOUNTER — OFFICE VISIT (OUTPATIENT)
Dept: CARDIOLOGY | Facility: MEDICAL CENTER | Age: 31
End: 2021-03-17
Payer: MEDICARE

## 2021-03-17 ENCOUNTER — APPOINTMENT (OUTPATIENT)
Dept: RADIOLOGY | Facility: MEDICAL CENTER | Age: 31
End: 2021-03-17
Attending: EMERGENCY MEDICINE
Payer: MEDICARE

## 2021-03-17 ENCOUNTER — HOSPITAL ENCOUNTER (EMERGENCY)
Facility: MEDICAL CENTER | Age: 31
End: 2021-03-17
Attending: EMERGENCY MEDICINE
Payer: MEDICARE

## 2021-03-17 VITALS
HEIGHT: 65 IN | BODY MASS INDEX: 38.49 KG/M2 | OXYGEN SATURATION: 98 % | DIASTOLIC BLOOD PRESSURE: 64 MMHG | SYSTOLIC BLOOD PRESSURE: 132 MMHG | HEART RATE: 79 BPM | WEIGHT: 231 LBS | RESPIRATION RATE: 18 BRPM

## 2021-03-17 VITALS
RESPIRATION RATE: 18 BRPM | HEIGHT: 65 IN | OXYGEN SATURATION: 99 % | BODY MASS INDEX: 38.49 KG/M2 | DIASTOLIC BLOOD PRESSURE: 62 MMHG | SYSTOLIC BLOOD PRESSURE: 110 MMHG | HEART RATE: 72 BPM | TEMPERATURE: 97.3 F | WEIGHT: 231 LBS

## 2021-03-17 DIAGNOSIS — R06.00 DYSPNEA, UNSPECIFIED TYPE: ICD-10-CM

## 2021-03-17 DIAGNOSIS — R07.89 OTHER CHEST PAIN: ICD-10-CM

## 2021-03-17 DIAGNOSIS — I47.10 SVT (SUPRAVENTRICULAR TACHYCARDIA) (HCC): ICD-10-CM

## 2021-03-17 DIAGNOSIS — I47.11 INAPPROPRIATE SINUS TACHYCARDIA (HCC): ICD-10-CM

## 2021-03-17 DIAGNOSIS — Z86.79 HISTORY OF SUPRAVENTRICULAR TACHYCARDIA: ICD-10-CM

## 2021-03-17 DIAGNOSIS — R94.31 NONSPECIFIC ABNORMAL ELECTROCARDIOGRAM (ECG) (EKG): ICD-10-CM

## 2021-03-17 DIAGNOSIS — S09.90XA CLOSED HEAD INJURY, INITIAL ENCOUNTER: ICD-10-CM
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
## 2021-03-17 PROCEDURE — 93000 ELECTROCARDIOGRAM COMPLETE: CPT | Performed by: INTERNAL MEDICINE

## 2021-03-17 PROCEDURE — 700102 HCHG RX REV CODE 250 W/ 637 OVERRIDE(OP): Performed by: EMERGENCY MEDICINE

## 2021-03-17 PROCEDURE — 99214 OFFICE O/P EST MOD 30 MIN: CPT | Performed by: NURSE PRACTITIONER

## 2021-03-17 PROCEDURE — 70450 CT HEAD/BRAIN W/O DYE: CPT | Mod: ME

## 2021-03-17 PROCEDURE — A9270 NON-COVERED ITEM OR SERVICE: HCPCS | Performed by: EMERGENCY MEDICINE

## 2021-03-17 PROCEDURE — 72070 X-RAY EXAM THORAC SPINE 2VWS: CPT

## 2021-03-17 PROCEDURE — 99284 EMERGENCY DEPT VISIT MOD MDM: CPT

## 2021-03-17 RX ORDER — HYDROCODONE BITARTRATE AND ACETAMINOPHEN 5; 325 MG/1; MG/1
1 TABLET ORAL ONCE
Status: COMPLETED | OUTPATIENT
Start: 2021-03-17 | End: 2021-03-17

## 2021-03-17 RX ADMIN — HYDROCODONE BITARTRATE AND ACETAMINOPHEN 1 TABLET: 5; 325 TABLET ORAL at 14:33

## 2021-03-17 ASSESSMENT — ENCOUNTER SYMPTOMS
ORTHOPNEA: 0
WHEEZING: 0
DIZZINESS: 1
WEAKNESS: 0
BLURRED VISION: 0
DOUBLE VISION: 0
FOCAL WEAKNESS: 0
SHORTNESS OF BREATH: 0
PALPITATIONS: 0
HEADACHES: 0
COUGH: 0
SORE THROAT: 1
LOSS OF CONSCIOUSNESS: 0
FALLS: 0

## 2021-03-17 ASSESSMENT — FIBROSIS 4 INDEX
FIB4 SCORE: 0.44
FIB4 SCORE: 0.44

## 2021-03-17 NOTE — ED TRIAGE NOTES
Chief Complaint   Patient presents with   • GLF     c/o hitting mid and upper back in a metal door frame after she fell. pt states she had difficulty ambulating since hitting her back.     Pt unsure what happened, also c/o hitting back of her head after the fall. Denies loc. No obvious trauma in the head. Arrived w/gcs of 15. Educated on triage process. Instructed to notify staff for any worsening symptoms. Denies any recent travel. Denies exposure to known covid positive patients. Denies any respiratory symptoms.

## 2021-03-17 NOTE — ED PROVIDER NOTES
"ED Provider Note    CHIEF COMPLAINT  Chief Complaint   Patient presents with   • GLF     c/o hitting mid and upper back in a metal door frame after she fell. pt states she had difficulty ambulating since hitting her back.       HPI  Kristin Balderrama is a 30 y.o. female who presents to the emergency department with complaint of ground-level fall.  The patient was walking her walker to her neuro-ophthalmologist appointment, she felt lightheaded, she fell backwards and hit her back and head against the door frame resulting in severe pain to the posterior aspect of her back as well as her head.  She feels lightheaded and dizzy after the event.  Patient denies loss of sensation or strength in arms or legs but does complain of dizziness post fall.  Denies fever, shakes, chills, sweats, nausea, vomiting.  She was seen here yesterday for neck pain and neck swelling.  She has been seen multiple times for recent falls and similar types of presentation stated there is nothing really different about her fall today.    REVIEW OF SYSTEMS  Positives as above. Pertinent negatives include fever, shakes, chills, sweats, nausea, vomiting, loss of sensation or strength in arms or legs, visual changes  All other 10 review of systems are negative    PAST MEDICAL HISTORY  Past Medical History:   Diagnosis Date   • Abdominal pain    • Anginal syndrome     random chest pain especially with tachycardia   • Apnea, sleep    • Arrhythmia     \"sinus tachycardia\", cariologist, Dr. Kumar; ablation 2/2016   • Arthritis     osteo   • ASTHMA     when around smoke   • Atrial fibrillation (HCC)    • Back pain    • Borderline personality disorder (HCC)    • Breath shortness     with tachycardia   • Bronchitis    • Cardiac arrhythmia    • Chickenpox    • Chronic UTI 9/18/2010   • COPD (chronic obstructive pulmonary disease) ([facility])    • Cough    • Daytime sleepiness    • Dental disorder 03/08/2021    infected tooth   • Depression    • Diabetes ([facility])  " "  • Diarrhea     incontinece   • Disorder of thyroid    • Fall    • Fatigue    • Frequent headaches    • Gasping for breath    • Gynecological disorder     PCOS   • Headache(784.0)    • Heart burn    • Heart murmur    • History of falling    • Indigestion    • Migraine    • Mitochondrial disease (HCC)    • 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HCC)    • Nausea    • Obesity    • Other fatigue 6/29/2020   • Pain 08-15-12    back, 7/10   • Painful joint    • PCOS (polycystic ovarian syndrome)    • Pneumonia 2012 12/2020   • Psychosis (HCC)    • Ringing in ears    • Scoliosis    • Shortness of breath     O2 as needed   • Sinus tachycardia 10/31/2013   • Sleep apnea     CPAP \"pulmonary doctor took me off mid year 2016\"   • Snoring    • Tonsillitis    • 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HCC)    • Tuberculosis     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   • Urinary bladder disorder     Suprapubic cath   • Urinary incontinence    • Weakness    • Wears glasses        FAMILY HISTORY  Noncontributory    SOCIAL HISTORY  Social History     Socioeconomic History   • Marital status: Single     Spouse name: Not on file   • Number of children: Not on file   • Years of education: Not on file   • Highest education level: Not on file   Occupational History   • Not on file   Tobacco Use   • Smoking status: Never Smoker   • Smokeless tobacco: Never Used   Substance and Sexual Activity   • Alcohol use: No     Alcohol/week: 0.0 oz   • Drug use: Not Currently     Frequency: 7.0 times per week     Types: Marijuana   • Sexual activity: Not Currently     Birth control/protection: Pill   Other Topics Concern   • Not on file   Social History Narrative    ** Merged History Encounter **          Social Determinants of Health     Financial Resource Strain: Low Risk    • Difficulty of Paying Living Expenses: Not hard at all   Food Insecurity: No Food Insecurity   • Worried About Running Out of Food in the Last Year: Never true   • Ran Out of Food in the Last Year: " Never true   Transportation Needs: No Transportation Needs   • Lack of Transportation (Medical): No   • Lack of Transportation (Non-Medical): No   Physical Activity:    • Days of Exercise per Week:    • Minutes of Exercise per Session:    Stress:    • Feeling of Stress :    Social Connections:    • Frequency of Communication with Friends and Family:    • Frequency of Social Gatherings with Friends and Family:    • Attends [employer] Services:    • Active Member of Clubs or Organizations:    • Attends Club or Organization Meetings:    • Marital Status:    Intimate Partner Violence:    • Fear of Current or Ex-Partner:    • Emotionally Abused:    • Physically Abused:    • Sexually Abused:        SURGICAL HISTORY  Past Surgical History:   Procedure Laterality Date   • BOWEL STIMULATOR INSERTION  3/10/2021    Procedure: INSERTION, ELECTRODE LEADS AND PULSE GENERATOR, NEUROSTIMULATOR, SACRAL - REMOVAL OF INTERSTIM WITH REPLACEMENT OF SACRAL NEUROMODULATION DEVICE;  Surgeon: Joe Noyola M.D.;  Location: [facility];  Service: General   • MUSCLE BIOPSY Right 1/26/2017    Procedure: MUSCLE BIOPSY - THIGH;  Surgeon: Isidro Vigil M.D.;  Location: [facility];  Service:    • GASTROSCOPY WITH BALLOON DILATATION N/A 1/4/2017    Procedure: GASTROSCOPY WITH DILATATION;  Surgeon: Torres Vargas M.D.;  Location: [facility];  Service:    • BOWEL STIMULATOR INSERTION  7/13/2016    Procedure: BOWEL STIMULATOR INSERTION FOR PERMANENT INTERSTIM SACRAL IMPLANT;  Surgeon: Joe Noyola M.D.;  Location: [facility];  Service:    • RECOVERY  1/27/2016    Procedure: CATH LAB EP STUDY WITH SINUS NODE MODIFICATION ABHINAV;  Surgeon: Recoveryonly Surgery;  Location: SURGERY [facility];  Service:    • OTHER CARDIAC SURGERY  1/2016    cardiac ablation   • NEURO DEST FACET L/S W/IG SNGL  4/21/2015    Performed by Reza Tabor at [facility] ORS   • LUMBAR FUSION ANTERIOR  8/21/2012     Performed by SUSIE SAWANT at SURGERY [facility] ORS   • ALYSSA BY LAPAROSCOPY  8/29/2010    Performed by SHAYY JOHNS at SURGERY [facility] ORS   • LAMINOTOMY     • OTHER ABDOMINAL SURGERY     • TONSILLECTOMY      tonsillectomy       CURRENT MEDICATIONS  Home Medications     Reviewed by Vanessa Bone R.N. (Registered Nurse) on 03/17/21 at 1322  Med List Status: <None>   Medication Last Dose Status   acetaminophen (TYLENOL) 500 MG Tab  Active   albuterol 108 (90 Base) MCG/ACT Aero Soln inhalation aerosol  Active   aspirin 81 MG EC tablet  Active   baclofen (LIORESAL) 10 MG Tab  Active   busPIRone (BUSPAR) 30 MG tablet  Active   calcium carbonate (TUMS EX) 750 MG chewable tablet  Active   diphenhydrAMINE (BENADRYL ALLERGY) 25 MG capsule  Active   Dulaglutide (TRULICITY) 1.5 MG/0.5ML Solution Pen-injector  Active   esomeprazole (NEXIUM) 40 MG delayed-release capsule  Active   fluoxetine (PROZAC) 40 MG capsule  Active   ipratropium-albuterol (DUONEB) 0.5-2.5 (3) MG/3ML nebulizer solution  Active   ivabradine (CORLANOR) 7.5 MG Tab tablet  Active   levothyroxine (SYNTHROID) 100 MCG Tab  Active   Melatonin 10 MG Tab  Active   Mirabegron ER (MYRBETRIQ) 50 MG TABLET SR 24 HR  Active   mycophenolate (CELLCEPT) 500 MG tablet  Active   ondansetron (ZOFRAN ODT) 4 MG TABLET DISPERSIBLE  Active   OXcarbazepine (TRILEPTAL) 300 MG Tab  Active   oxybutynin (DITROPAN) 5 MG Tab  Active   penicillin v potassium (VEETID) 500 MG Tab  Active   potassium chloride SA (KDUR) 20 MEQ Tab CR  Active   predniSONE (DELTASONE) 10 MG Tab  Active   pregabalin (LYRICA) 300 MG capsule  Active   sodium bicarbonate 325 MG Tab  Active   topiramate (TOPAMAX) 50 MG tablet  Active   traZODone (DESYREL) 100 MG Tab  Active   vitamin D, Ergocalciferol, (DRISDOL) 1.25 MG (34794 UT) Cap capsule  Active   ziprasidone (GEODON) 40 MG Cap  Active                ALLERGIES  Allergies   Allergen Reactions   • Depakote [Divalproex Sodium] Unspecified      "Muscle spasms/muscle pain and weakness     • Amitriptyline Unspecified     Headaches     • Aripiprazole [Abilify] Unspecified     Headaches/muscle twitching     • Clindamycin Nausea     Even with food     • Flagyl [Metronidazole Hcl] Unspecified     \"eye problems\"     • Flomax [Tamsulosin Hydrochloride] Swelling   • Levaquin Unspecified     Severe muscle cramps in legs  RXN=unknown   • Metformin Unspecified     Increased lactic acid      • Tamsulosin Swelling     Swelling of legs   • Tape Rash     Tears skin off  coban with Tegaderm tape ok intermittently  RXN=ongoing   • Vancomycin Itching     Pt becomes flushed in face and chest.   RXN=7/10/16   • Wound Dressing Adhesive Hives     By pt report   • Ampicillin Rash     Pt reports that she received a rash    • Ciprofloxacin Rash         • Keflex Rash     Pt states she gets a rash with this medication  Tolerates ceftriaxone  Can take with Benadryl   • Levofloxacin Unspecified     Leg muscle cramps   • Metronidazole Rash     \"Vision problems\"   • Penicillins Hives     Can take with Benadryl   • Sulfa Drugs Itching and Myalgia     Muscle pain and weakness   • Valproic Acid Rash     .       PHYSICAL EXAM  VITAL SIGNS: /62   Pulse 72   Temp 36.3 °C (97.3 °F) (Temporal)   Resp 18   Ht 1.651 m (5' 5\")   Wt 105 kg (231 lb)   SpO2 99%   BMI 38.44 kg/m²    BMI of 38.4  Constitutional: Well developed, BMI 38.4, no acute distress, Non-toxic appearance.   Eyes: PERRLA, EOMI, Conjunctiva normal, No discharge.   Neck: No cervical spine tenderness, no step-off deformity  HENT: The patient had a significant hematoma and tenderness to the occipital region of the skull, there is no active bleeding, no hemotympanum bilaterally, negative sunshine signs, no raccoon eyes, no oral trauma  Cardiovascular: Normal heart rate, Normal rhythm, No murmurs, No rubs, No gallops, and intact distal pulses.   Thorax & Lungs:  No respiratory distress, no rales, no rhonchi, No wheezing, No " chest wall tenderness.    Skin: Warm, Dry, No erythema, No rash.   Musculoskeletal: Mid thoracic spinous process tenderness, no step-off deformity, paravertebral muscle spasm in the thoracic spine, no lumbar spinous process tenderness or para vertebral muscle spasm lumbar spine hips are stable  Extremities: Full range of motion, no deformity, no edema, distal cap refill less than 2 seconds.  Neurologic: Alert & oriented x 3, strength leg lift 5/5 bilaterally, plantar flexion dorsiflexion 5/5 bilaterally, no saddle esthesia, DTRs are 2/4 L4 and S1 bilaterally  Psychiatric: Affect normal for clinical presentation.      RADIOLOGY/PROCEDURES  CT-HEAD W/O   Final Result      No evidence of acute intracranial process.      DX-THORACIC SPINE-2 VIEWS   Final Result      No evidence of fracture.            COURSE & MEDICAL DECISION MAKING  Pertinent Labs & Imaging studies reviewed. (See chart for details)  This is a pleasant 30-year-old female that experienced a ground-level fall.  Here in the emergency department, she had a significant headache as well as possible skull fracture therefore CT scan of the head was completed.  Fortunately CT scan was negative intracranial hemorrhage, or skull fracture.  In addition, she had a thoracic pain and in the setting of this traumatic injury I believe the patient warranted thoracic spine x-rays.  X-rays are negative for fracture, subluxation or dislocation.  The patient see 1 dose of Norco/5/325 orally.  On reevaluation prior to discharge, she was amatory, she has no focal neurological vascular deficit that are new, she had no complaints.  FINAL IMPRESSION     1. Fall, initial encounter    2. Closed head injury, initial encounter        DISPOSITION:  Patient will be discharged home in stable condition.    FOLLOW UP:  [facility], Emergency Dept  [address] [address] [phone]    If symptoms worsen    Sue Preston M.D.  [address]  [address]  [facility] [address]  [phone]    Schedule an appointment as soon as possible for a visit in 1 week  As needed      Electronically signed by: Jim Sandhu D.O., 3/17/2021 2:12 PM

## 2021-03-17 NOTE — PROGRESS NOTES
"Chief Complaint   Patient presents with   • Tachycardia       Subjective:   Kristin Balderrama is a 30 y.o. female who presents today for follow up bradycardia with heaviness in the chest.     She is followed by Dr. Leon in our clinic, history of sinus tachycardia with ablation 2016 with post ablation PAF on chronic corlonar therapy. Sleep apnea on CPAP, diabetes, schizophrenia/borderline personality disorder and morbid obesity.    Last seen 1/7/2021 with Dr. Leon. Recommended to start potassium and check mag/bmp.     Went to ER yesterday with dental infection on . She forgot to inform MD regarding her chest pain and bradycardia.    She stated it started yesterday with heaviness in the chest radiating to her back. she noticed on her iphone that her heart rate was down to 47bpm. Pain worse with exertion per patient. She was just started on pencillin with bendaryl.    Past Medical History:   Diagnosis Date   • Abdominal pain    • Anginal syndrome     random chest pain especially with tachycardia   • Apnea, sleep    • Arrhythmia     \"sinus tachycardia\", cariologist, Dr. Kumar; ablation 2/2016   • Arthritis     osteo   • ASTHMA     when around smoke   • Atrial fibrillation (HCC)    • Back pain    • Borderline personality disorder (HCC)    • Breath shortness     with tachycardia   • Bronchitis    • Cardiac arrhythmia    • Chickenpox    • Chronic UTI 9/18/2010   • COPD (chronic obstructive pulmonary disease) (HCC)    • Cough    • Daytime sleepiness    • Dental disorder 03/08/2021    infected tooth   • Depression    • Diabetes (HCC)    • Diarrhea     incontinece   • Disorder of thyroid    • Fall    • Fatigue    • Frequent headaches    • Gasping for breath    • Gynecological disorder     PCOS   • Headache(784.0)    • Heart burn    • Heart murmur    • History of falling    • Indigestion    • Migraine    • Mitochondrial disease (HCC)    • 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HCC)    • Nausea    • Obesity    • Other " "fatigue 6/29/2020   • Pain 08-15-12    back, 7/10   • Painful joint    • PCOS (polycystic ovarian syndrome)    • Pneumonia 2012 12/2020   • Psychosis (HCC)    • Ringing in ears    • Scoliosis    • Shortness of breath     O2 as needed   • Sinus tachycardia 10/31/2013   • Sleep apnea     CPAP \"pulmonary doctor took me off mid year 2016\"   • Snoring    • Tonsillitis    • 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HCC)    • Tuberculosis     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   • Urinary bladder disorder     Suprapubic cath   • Urinary incontinence    • Weakness    • Wears glasses      Past Surgical History:   Procedure Laterality Date   • BOWEL STIMULATOR INSERTION  3/10/2021    Procedure: INSERTION, ELECTRODE LEADS AND PULSE GENERATOR, NEUROSTIMULATOR, SACRAL - REMOVAL OF INTERSTIM WITH REPLACEMENT OF SACRAL NEUROMODULATION DEVICE;  Surgeon: Joe Noyola M.D.;  Location: [facility];  Service: General   • MUSCLE BIOPSY Right 1/26/2017    Procedure: MUSCLE BIOPSY - THIGH;  Surgeon: Isidro Vigil M.D.;  Location: [facility];  Service:    • GASTROSCOPY WITH BALLOON DILATATION N/A 1/4/2017    Procedure: GASTROSCOPY WITH DILATATION;  Surgeon: Torres Vargas M.D.;  Location: [facility];  Service:    • BOWEL STIMULATOR INSERTION  7/13/2016    Procedure: BOWEL STIMULATOR INSERTION FOR PERMANENT INTERSTIM SACRAL IMPLANT;  Surgeon: Joe Noyola M.D.;  Location: [facility];  Service:    • RECOVERY  1/27/2016    Procedure: CATH LAB EP STUDY WITH SINUS NODE MODIFICATION ABHINAV;  Surgeon: Recoveryonly Surgery;  Location: SURGERY PRE-POST PROC UNIT [facility];  Service:    • OTHER CARDIAC SURGERY  1/2016    cardiac ablation   • NEURO DEST FACET L/S W/IG SNGL  4/21/2015    Performed by Reza Tabor at [facility] ORS   • LUMBAR FUSION ANTERIOR  8/21/2012    Performed by SUSIE SAWANT at [facility]   • ALYSSA BY LAPAROSCOPY  8/29/2010    Performed by SHAYY JOHNS at [facility]GERY " [facility]   • LAMINOTOMY     • OTHER ABDOMINAL SURGERY     • TONSILLECTOMY      tonsillectomy     Family History   Problem Relation Age of Onset   • Hypertension Mother    • Sleep Apnea Mother    • Heart Disease Mother    • Other Mother         hypothryod   • Hypertension Maternal Uncle    • Heart Disease Maternal Grandmother    • Hypertension Maternal Grandmother    • No Known Problems Sister    • Other Sister         Narcolepsy;fibromyalsia;bone;nerve   • Genitourinary () Problems Sister         endometriosis     Social History     Socioeconomic History   • Marital status: Single     Spouse name: Not on file   • Number of children: Not on file   • Years of education: Not on file   • Highest education level: Not on file   Occupational History   • Not on file   Tobacco Use   • Smoking status: Never Smoker   • Smokeless tobacco: Never Used   Substance and Sexual Activity   • Alcohol use: No     Alcohol/week: 0.0 oz   • Drug use: Not Currently     Frequency: 7.0 times per week     Types: Marijuana   • Sexual activity: Not Currently     Birth control/protection: Pill   Other Topics Concern   • Not on file   Social History Narrative    ** Merged History Encounter **          Social Determinants of Health     Financial Resource Strain: Low Risk    • Difficulty of Paying Living Expenses: Not hard at all   Food Insecurity: No Food Insecurity   • Worried About Running Out of Food in the Last Year: Never true   • Ran Out of Food in the Last Year: Never true   Transportation Needs: No Transportation Needs   • Lack of Transportation (Medical): No   • Lack of Transportation (Non-Medical): No   Physical Activity:    • Days of Exercise per Week:    • Minutes of Exercise per Session:    Stress:    • Feeling of Stress :    Social Connections:    • Frequency of Communication with Friends and Family:    • Frequency of Social Gatherings with Friends and Family:    • Attends Judaism Services:    • Active Member of Clubs or  "Organizations:    • Attends Club or Organization Meetings:    • Marital Status:    Intimate Partner Violence:    • Fear of Current or Ex-Partner:    • Emotionally Abused:    • Physically Abused:    • Sexually Abused:      Allergies   Allergen Reactions   • Depakote [Divalproex Sodium] Unspecified     Muscle spasms/muscle pain and weakness     • Amitriptyline Unspecified     Headaches     • Aripiprazole [Abilify] Unspecified     Headaches/muscle twitching     • Clindamycin Nausea     Even with food     • Flagyl [Metronidazole Hcl] Unspecified     \"eye problems\"     • Flomax [Tamsulosin Hydrochloride] Swelling   • Levaquin Unspecified     Severe muscle cramps in legs  RXN=unknown   • Metformin Unspecified     Increased lactic acid      • Tamsulosin Swelling     Swelling of legs   • Tape Rash     Tears skin off  coban with Tegaderm tape ok intermittently  RXN=ongoing   • Vancomycin Itching     Pt becomes flushed in face and chest.   RXN=7/10/16   • Wound Dressing Adhesive Hives     By pt report   • Ampicillin Rash     Pt reports that she received a rash    • Ciprofloxacin Rash         • Keflex Rash     Pt states she gets a rash with this medication  Tolerates ceftriaxone  Can take with Benadryl   • Levofloxacin Unspecified     Leg muscle cramps   • Metronidazole Rash     \"Vision problems\"   • Penicillins Hives     Can take with Benadryl   • Sulfa Drugs Itching and Myalgia     Muscle pain and weakness   • Valproic Acid Rash     .     Outpatient Encounter Medications as of 3/17/2021   Medication Sig Dispense Refill   • busPIRone (BUSPAR) 30 MG tablet Take 1 tablet by mouth twice a day 180 tablet 0   • ziprasidone (GEODON) 40 MG Cap Take 1 tablet by mouth twice a day 180 capsule 0   • OXcarbazepine (TRILEPTAL) 300 MG Tab Take 1 tablet by mouth 2 times a day. 180 tablet 0   • fluoxetine (PROZAC) 40 MG capsule Take 1 capsule by mouth every day. 90 capsule 0   • traZODone (DESYREL) 100 MG Tab Take 1 tablet by mouth every " bedtime. 90 tablet 0   • predniSONE (DELTASONE) 10 MG Tab Take 10-70 mg by mouth every day. Started 2/27/2021. Take 7 tablets (70 mg) every day for 2 days, then 6 tablets (60 mg) every day for 2 days, then 5 tablets (50 mg) every day for 2 days, then 4 tablets (40 mg) every day for 2 days, then 3 tablets (30 mg) every day for 2 days, then 2 tablets (20 mg) every day for 2 days, then 1 tablet (10 mg) every day for 2 days.     • aspirin 81 MG EC tablet Take 81 mg by mouth every morning.     • penicillin v potassium (VEETID) 500 MG Tab Take 500 mg by mouth 4 times a day. 7 day course started 3/1/2021.     • diphenhydrAMINE (BENADRYL ALLERGY) 25 MG capsule Take 50 mg by mouth 4 times a day as needed (Antibiotic Allergy). 2 capsules = 50 mg. TAKEN WITH EACH ANTIBIOTIC DOSE.     • Dulaglutide (TRULICITY) 1.5 MG/0.5ML Solution Pen-injector Inject 0.5 mL under the skin every 7 days. 6 mL 3   • potassium chloride SA (KDUR) 20 MEQ Tab CR Take 40 mEq by mouth 2 times a day. 2 tablets = 40 mEq.     • mycophenolate (CELLCEPT) 500 MG tablet Take 2 Tablets by mouth 2 times a day. (Patient taking differently: Take 500 mg by mouth 2 times a day.) 120 tablet 0   • pregabalin (LYRICA) 300 MG capsule Take 1 Cap by mouth 2 times a day for 60 days. 60 Cap 1   • baclofen (LIORESAL) 10 MG Tab Take 1 Tab by mouth every bedtime. 30 Tab 1   • esomeprazole (NEXIUM) 40 MG delayed-release capsule Take 40 mg by mouth every morning.     • levothyroxine (SYNTHROID) 100 MCG Tab Take 100 mcg by mouth Every morning on an empty stomach.     • Melatonin 10 MG Tab Take 10 mg by mouth every bedtime.     • albuterol 108 (90 Base) MCG/ACT Aero Soln inhalation aerosol Inhale 2 Puffs every 6 hours as needed for Shortness of Breath. 8.5 g 6   • Mirabegron ER (MYRBETRIQ) 50 MG TABLET SR 24 HR Take 50 mg by mouth every day. (Patient taking differently: Take 50 mg by mouth every morning.) 90 Tab 3   • acetaminophen (TYLENOL) 500 MG Tab Take 1,000 mg by mouth  every 6 hours as needed for Moderate Pain. 2 tablets = 1000 mg.     • sodium bicarbonate 325 MG Tab Take 650 mg by mouth 2 times a day.     • topiramate (TOPAMAX) 50 MG tablet Take 50 mg by mouth 2 times a day.     • ondansetron (ZOFRAN ODT) 4 MG TABLET DISPERSIBLE Take 1 Tab by mouth every 6 hours as needed for Nausea. 10 Tab 0   • ipratropium-albuterol (DUONEB) 0.5-2.5 (3) MG/3ML nebulizer solution Take 3 mL by nebulization every four hours as needed for Shortness of Breath. Nebulizer      • calcium carbonate (TUMS EX) 750 MG chewable tablet Take 2 Tabs by mouth every day. 60 Tab 0   • vitamin D, Ergocalciferol, (DRISDOL) 1.25 MG (89150 UT) Cap capsule Take 50,000 Units by mouth every Friday. In the morning.     • oxybutynin (DITROPAN) 5 MG Tab Take 1 Tab by mouth 3 times a day. 90 Tab 0   • ivabradine (CORLANOR) 7.5 MG Tab tablet Take 7.5 mg by mouth 2 times a day, with meals.     • [DISCONTINUED] penicillin v potassium (VEETID) 500 MG Tab Take 1 tablet by mouth 3 times a day for 7 days. (Patient not taking: Reported on 3/17/2021) 21 tablet 0   • [DISCONTINUED] diphenhydrAMINE (BENADRYL) 25 MG capsule Take 2 Capsules by mouth every 6 hours as needed for Itching or Rash for up to 7 days. (Patient not taking: Reported on 3/17/2021) 30 capsule 0     No facility-administered encounter medications on file as of 3/17/2021.     Review of Systems   Constitutional: Positive for malaise/fatigue.   HENT: Positive for sore throat.    Eyes: Negative for blurred vision and double vision.   Respiratory: Negative for cough, shortness of breath and wheezing.    Cardiovascular: Positive for chest pain. Negative for palpitations, orthopnea and leg swelling.   Musculoskeletal: Negative for falls.   Neurological: Positive for dizziness. Negative for focal weakness, loss of consciousness, weakness and headaches.   All other systems reviewed and are negative.       Objective:   /64 (BP Location: Left arm, Patient Position:  "Sitting, BP Cuff Size: Adult)   Pulse 79   Resp 18   Ht 1.651 m (5' 5\")   Wt 105 kg (231 lb)   SpO2 98%   BMI 38.44 kg/m²     Physical Exam   Constitutional: She is oriented to person, place, and time. She appears well-developed and well-nourished. No distress.   HENT:   Head: Normocephalic and atraumatic.   Eyes: Pupils are equal, round, and reactive to light.   Neck: No JVD present.   Cardiovascular: Normal rate, regular rhythm and normal heart sounds.   No murmur heard.  Pulmonary/Chest: Effort normal and breath sounds normal. No respiratory distress.   Abdominal: Soft. Bowel sounds are normal. She exhibits no distension.   Musculoskeletal:         General: No edema.   Neurological: She is alert and oriented to person, place, and time.   Skin: Skin is warm and dry. She is not diaphoretic.   Psychiatric: She has a normal mood and affect. Her behavior is normal. Judgment and thought content normal.          Monitor   8/26/2020  1. Sinus rhythm with appropriate heart rate range. Average 73, range 52 to 117 bpm.   2. Normal sinus node and AV node conduction normal. No pauses.   3. Rare PACs.   4. Rare PVCs.   5. No sustained rhythm changes. No atrial fibrillation/flutter.   6. 5 patient triggers, symptoms reported include fluttering and racing, shaky during sinus, 73 to 90 bpm..     Conclusion: Normal monitor.  Sinus rhythm with rare PVCs and PACs.  Symptoms during sinus rhythm.     Lexy Solomon MD [facility]     ECHO  3/6/2021  FINDINGS  Left Ventricle  Normal left ventricular size, wall thickness, and systolic function.   Left ventricular ejection fraction is visually estimated to be 60%.     Right Ventricle  Normal right ventricular size and systolic function.     Right Atrium  Normal right atrial size.     Left Atrium  Normal left atrial size.     Mitral Valve  Structurally normal mitral valve without significant stenosis or   regurgitation.     Aortic Valve  Structurally normal aortic valve without " significant stenosis or   regurgitation.     Tricuspid Valve  Structurally normal tricuspid valve. No tricuspid stenosis. Mild   tricuspid regurgitation. Right ventricular systolic pressure is   estimated to be 18 mmHg. Right atrial pressure is estimated to be 3   mmHg.     Pulmonic Valve  Structurally normal pulmonic valve without significant stenosis or   regurgitation.     Pericardium  Normal pericardium without effusion.     Aorta  Normal aortic root for body surface area.       Myocardial Perfusion  4/6/2017   No myocardial infarct or inducible ischemia.   Normal LEFT ventricular function.    Assessment:     1. SVT (supraventricular tachycardia) ([facility])     2. Other chest pain  EKG - Clinic Performed   3. Inappropriate sinus tachycardia  NM-CARDIAC PET   4. History of supraventricular tachycardia  NM-CARDIAC PET   5. Dyspnea, unspecified type  NM-CARDIAC PET   6. Nonspecific abnormal electrocardiogram (ECG) (EKG)  NM-CARDIAC PET       Medical Decision Making:  Today's Assessment / Status / Plan:     1. Chest pain:  - EKG today showed NSR no change to previous ekg   - continue asa therapy   - PET myocardial perfusion scan, MPI in 2017 normal no inducible ischemia   - ER precaution discussed     2. Sinus tachycardia s/p ablation:  - continue corlanor 7.5mg BID   - ECHO showed ef 60%    3. History of SVT     Follow up in 3 months, after PET myocardial perfusion scan

## 2021-03-18 LAB — EKG IMPRESSION: NORMAL

## 2021-03-18 NOTE — ED NOTES
Discharge instructions given to pt. Prescriptions unchanged. Pt educated, verbalizes understanding. All belongings accounted for. Pt ambulated out of ED with steady gait and personal walker to go home with mom.

## 2021-03-19 ENCOUNTER — TELEPHONE (OUTPATIENT)
Dept: CARDIOLOGY | Facility: MEDICAL CENTER | Age: 31
End: 2021-03-19

## 2021-03-19 RX ORDER — IVABRADINE 7.5 MG/1
7.5 TABLET, FILM COATED ORAL 2 TIMES DAILY WITH MEALS
Qty: 180 TABLET | Refills: 3 | Status: SHIPPED | OUTPATIENT
Start: 2021-03-19 | End: 2021-11-16

## 2021-03-19 NOTE — TELEPHONE ENCOUNTER
RT    Pt called needing ivabradine (CORLANOR) 7.5 MG Tab tablet sent to [facility] on [address] [facility] [address]. Pt will run out of medication this weekend.    Thank you

## 2021-03-21 ENCOUNTER — HOSPITAL ENCOUNTER (EMERGENCY)
Facility: MEDICAL CENTER | Age: 31
End: 2021-03-21
Attending: EMERGENCY MEDICINE | Admitting: EMERGENCY MEDICINE
Payer: MEDICARE

## 2021-03-21 VITALS
RESPIRATION RATE: 16 BRPM | TEMPERATURE: 97.1 F | DIASTOLIC BLOOD PRESSURE: 91 MMHG | OXYGEN SATURATION: 97 % | SYSTOLIC BLOOD PRESSURE: 135 MMHG | HEART RATE: 101 BPM

## 2021-03-21 DIAGNOSIS — S00.83XA CONTUSION OF FOREHEAD, INITIAL ENCOUNTER: ICD-10-CM
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
## 2021-03-21 PROCEDURE — 99281 EMR DPT VST MAYX REQ PHY/QHP: CPT

## 2021-03-21 ASSESSMENT — LIFESTYLE VARIABLES
DO YOU DRINK ALCOHOL: NO
DOES PATIENT WANT TO STOP DRINKING: NO

## 2021-03-22 ENCOUNTER — TELEPHONE (OUTPATIENT)
Dept: CARDIOLOGY | Facility: MEDICAL CENTER | Age: 31
End: 2021-03-22

## 2021-03-22 NOTE — ED PROVIDER NOTES
ED Provider Note    This patient was cared for during the COVID-19 pandemic. History and physical exam may be limited/truncated by the inherent challenges of PPE and the need to decrease staff exposure to novel coronavirus. Some aspects of disease management may be different to protect staff and help slow the spread of disease. I verified that, if possible, the patient was wearing a mask and I was wearing appropriate PPE every time I encountered the patient.       CHIEF COMPLAINT  Chief Complaint   Patient presents with   • T-5000 GLF     pt states she was here for GLF on 3/17. pt had a fall yesterday and today. pt states she hit her head. negative LOC.        HPI  Kristin Balderrama is a 30 y.o. female who presents for evaluation of a fall.  She was seen 4 days ago for a fall had a head CT at that time he fell again yesterday and then today.  Today she had the right forehead on the ground.  There is no loss of consciousness.  She says her pupils change sizes at the time and she felt nauseated and had one episode of vomiting.  This occurred about an hour and half prior to arrival she has really had no issues since.  She says she feels little bit slow with her mentation but otherwise has no new neurologic deficits.      REVIEW OF SYSTEMS  Positive for head pain, negative for fever.     PAST MEDICAL HISTORY   has a past medical history of Abdominal pain, Anginal syndrome, Apnea, sleep, Arrhythmia, Arthritis, ASTHMA, Atrial fibrillation ([facility]), Back pain, Borderline personality disorder ([facility]), Breath shortness, Bronchitis, Cardiac arrhythmia, Chickenpox, Chronic UTI (9/18/2010), COPD (chronic obstructive pulmonary disease) ([facility]), Cough, Daytime sleepiness, Dental disorder (03/08/2021), Depression, Diabetes (HCC), Diarrhea, Disorder of thyroid, Fall, Fatigue, Frequent headaches, Gasping for breath, Gynecological disorder, Headache(784.0), Heart burn, Heart murmur, History of falling, Indigestion, Migraine,  Mitochondrial disease (HCC), 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HCC), Nausea, Obesity, Other fatigue (6/29/2020), Pain (08-15-12), Painful joint, PCOS (polycystic ovarian syndrome), Pneumonia (2012 12/2020), Psychosis ([facility]), Ringing in ears, Scoliosis, Shortness of breath, Sinus tachycardia (10/31/2013), Sleep apnea, Snoring, Tonsillitis, 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[facility]), Tuberculosis, Urinary bladder disorder, Urinary incontinence, Weakness, and Wears glasses.    SOCIAL HISTORY  Social History     Tobacco Use   • Smoking status: Never Smoker   • Smokeless tobacco: Never Used   Substance and Sexual Activity   • Alcohol use: No     Alcohol/week: 0.0 oz   • Drug use: Not Currently     Frequency: 7.0 times per week     Types: Marijuana   • Sexual activity: Not Currently     Birth control/protection: Pill       SURGICAL HISTORY   has a past surgical history that includes neuro dest facet l/s w/ig sngl (4/21/2015); recovery (1/27/2016); [place] by laparoscopy (8/29/2010); lumbar fusion anterior (8/21/2012); other cardiac surgery (1/2016); tonsillectomy; bowel stimulator insertion (7/13/2016); gastroscopy with balloon dilatation (N/A, 1/4/2017); muscle biopsy (Right, 1/26/2017); other abdominal surgery; laminotomy; and bowel stimulator insertion (3/10/2021).    CURRENT MEDICATIONS  Home Medications     Reviewed by Mavis Hickey R.N. (Registered Nurse) on 03/21/21 at 1743  Med List Status: <None>   Medication Last Dose Status   acetaminophen (TYLENOL) 500 MG Tab  Active   albuterol 108 (90 Base) MCG/ACT Aero Soln inhalation aerosol  Active   aspirin 81 MG EC tablet  Active   baclofen (LIORESAL) 10 MG Tab  Active   busPIRone (BUSPAR) 30 MG tablet  Active   calcium carbonate (TUMS EX) 750 MG chewable tablet  Active   diphenhydrAMINE (BENADRYL ALLERGY) 25 MG capsule  Active   Dulaglutide (TRULICITY) 1.5 MG/0.5ML Solution Pen-injector  Active   esomeprazole (NEXIUM) 40 MG delayed-release capsule  Active   fluoxetine (PROZAC) 40 MG  "capsule  Active   ipratropium-albuterol (DUONEB) 0.5-2.5 (3) MG/3ML nebulizer solution  Active   ivabradine (CORLANOR) 7.5 MG Tab tablet  Active   levothyroxine (SYNTHROID) 100 MCG Tab  Active   Melatonin 10 MG Tab  Active   Mirabegron ER (MYRBETRIQ) 50 MG TABLET SR 24 HR  Active   mycophenolate (CELLCEPT) 500 MG tablet  Active   ondansetron (ZOFRAN ODT) 4 MG TABLET DISPERSIBLE  Active   OXcarbazepine (TRILEPTAL) 300 MG Tab  Active   oxybutynin (DITROPAN) 5 MG Tab  Active   penicillin v potassium (VEETID) 500 MG Tab  Active   potassium chloride SA (KDUR) 20 MEQ Tab CR  Active   predniSONE (DELTASONE) 10 MG Tab  Active   pregabalin (LYRICA) 300 MG capsule  Active   sodium bicarbonate 325 MG Tab  Active   topiramate (TOPAMAX) 50 MG tablet  Active   traZODone (DESYREL) 100 MG Tab  Active   vitamin D, Ergocalciferol, (DRISDOL) 1.25 MG (82384 UT) Cap capsule  Active   ziprasidone (GEODON) 40 MG Cap  Active                ALLERGIES  Allergies   Allergen Reactions   • Depakote [Divalproex Sodium] Unspecified     Muscle spasms/muscle pain and weakness     • Amitriptyline Unspecified     Headaches     • Aripiprazole [Abilify] Unspecified     Headaches/muscle twitching     • Clindamycin Nausea     Even with food     • Flagyl [Metronidazole Hcl] Unspecified     \"eye problems\"     • Flomax [Tamsulosin Hydrochloride] Swelling   • Levaquin Unspecified     Severe muscle cramps in legs  RXN=unknown   • Metformin Unspecified     Increased lactic acid      • Tamsulosin Swelling     Swelling of legs   • Tape Rash     Tears skin off  coban with Tegaderm tape ok intermittently  RXN=ongoing   • Vancomycin Itching     Pt becomes flushed in face and chest.   RXN=7/10/16   • Wound Dressing Adhesive Hives     By pt report   • Ampicillin Rash     Pt reports that she received a rash    • Ciprofloxacin Rash         • Keflex Rash     Pt states she gets a rash with this medication  Tolerates ceftriaxone  Can take with Benadryl   • Levofloxacin " "Unspecified     Leg muscle cramps   • Metronidazole Rash     \"Vision problems\"   • Penicillins Hives     Can take with Benadryl   • Sulfa Drugs Itching and Myalgia     Muscle pain and weakness   • Valproic Acid Rash     .       PHYSICAL EXAM  VITAL SIGNS: /91   Pulse (!) 101   Temp 36.2 °C (97.1 °F) (Temporal)   Resp 16   SpO2 97%   Constitutional: Alert in no apparent distress. Chronically ill appearing  HENT: Normocephalic, minimal erythema on the right forehead, Bilateral external ears normal. Nose normal.   Eyes: Pupils are equal and reactive. Conjunctiva normal, non-icteric.   Heart: Regular rate and rythm, no murmurs.    Lungs: Clear to auscultation bilaterally.  Skin: Warm, Dry, No erythema, No rash.   Neurologic: Alert, slight lower extremity weakness which is chronic  Psychiatric: Affect normal, Judgment normal, Mood normal, Appears appropriate and not intoxicated.   Extremities: Intact distal pulses, No edema, No tenderness    DIAGNOSTIC STUDIES / PROCEDURES    Patient was seen on the 17th which was 4 days ago for a fall.  She had a head CT at that time that was negative.    COURSE & MEDICAL DECISION MAKING  Pertinent Labs & Imaging studies reviewed. (See chart for details)    This is a 30-year-old female that presents for evaluation of head injury.  Patient's alert oriented she has some chronic neurologic issues but nothing new since this fall episode of vomiting no persistent.  She does take a baby aspirin a day.  It was a ground-level fall with no loss of consciousness.  I do not think given this mechanism there is a high suspicion for intracranial bleed.  Patient has had multiple CT scans in the past and would like to limit radiation when possible.  Patient is agreeable to this I do not think she requires imaging at this time she will be discharged.     The patient will return for new or worsening symptoms and is stable at the time of discharge. Patient was given return precautions. Patient " and/or family member verbalizes understanding and will comply.    DISPOSITION:  Patient will be discharged home in stable condition.    FOLLOW UP:  Sue Preston M.D.  [address]  [address]  [facility] [id number] [phone]    Schedule an appointment as soon as possible for a visit       [facility], Emergency Dept  [address] [facility] [address] [facility]  [place] [id number] [phone]    Return for persistent vomiting new neurologic deficits or other concerns        OUTPATIENT MEDICATIONS:  Discharge Medication List as of 3/21/2021  6:55 PM            FINAL IMPRESSION  1. Fall from ground level     2. Contusion of forehead, initial encounter         2.   3.     This dictation has been creating using voice recognition software. The accuracy of the dictation is limited the abilities of the software.  I expect there may be some errors of grammar and possibly content. I made every attempt to manually correct the errors within my dictation. However errors related to this voice recognition software may still exist and should be interpreted within the appropriate context.        The note accurately reflects work and decisions made by me.  Floridalma Herrera M.D.  3/21/2021  8:23 PM

## 2021-03-22 NOTE — TELEPHONE ENCOUNTER
SW    Patient called and wanted to let you know that her medication is stuck on the pass, she is waiting for pharmacy to call her about when it arrives, and her HR is becoming irregular. She asked for a call back from the nurse at [phone].    Thank you,    Lynnette CORRALES

## 2021-03-22 NOTE — TELEPHONE ENCOUNTER
"Called patient back and she stated she did receive a call back from her pharmacy and they did receive her medication.  I asked if she was able to get it and she stated \"yes, I am able to get it\".  Answered all questions and concerns.    "

## 2021-03-22 NOTE — ED TRIAGE NOTES
.  Chief Complaint   Patient presents with   • T-5000 GLF     pt states she was here for GLF on 3/17. pt had a fall yesterday and today. pt states she hit her head. negative LOC.        Pt states she was using her cane during her falls yesterday and today. Pt states she takes 81mg of asa daily. Pt aox4, no neuro deficits, no signs of distress. Educated pt on triage process and to notify if there is any change.        /91   Pulse (!) 101   Temp 36.2 °C (97.1 °F) (Temporal)   Resp 16   SpO2 97%

## 2021-03-24 ENCOUNTER — APPOINTMENT (OUTPATIENT)
Dept: RADIOLOGY | Facility: MEDICAL CENTER | Age: 31
End: 2021-03-24
Attending: EMERGENCY MEDICINE
Payer: MEDICARE

## 2021-03-24 ENCOUNTER — HOSPITAL ENCOUNTER (EMERGENCY)
Facility: MEDICAL CENTER | Age: 31
End: 2021-03-24
Attending: EMERGENCY MEDICINE
Payer: MEDICARE

## 2021-03-24 VITALS
DIASTOLIC BLOOD PRESSURE: 69 MMHG | HEIGHT: 65 IN | SYSTOLIC BLOOD PRESSURE: 122 MMHG | HEART RATE: 80 BPM | OXYGEN SATURATION: 98 % | WEIGHT: 244.71 LBS | TEMPERATURE: 98.3 F | BODY MASS INDEX: 40.77 KG/M2 | RESPIRATION RATE: 18 BRPM

## 2021-03-24 DIAGNOSIS — R51.9 FACIAL PAIN: ICD-10-CM

## 2021-03-24 LAB
ALBUMIN SERPL BCP-MCNC: 4 G/DL (ref 3.2–4.9)
ALBUMIN/GLOB SERPL: 1.7 G/DL
ALP SERPL-CCNC: 84 U/L (ref 30–99)
ALT SERPL-CCNC: 21 U/L (ref 2–50)
ANION GAP SERPL CALC-SCNC: 11 MMOL/L (ref 7–16)
AST SERPL-CCNC: 14 U/L (ref 12–45)
BASOPHILS # BLD AUTO: 0.8 % (ref 0–1.8)
BASOPHILS # BLD: 0.03 K/UL (ref 0–0.12)
BILIRUB SERPL-MCNC: 0.3 MG/DL (ref 0.1–1.5)
BLOOD CULTURE HOLD CXBCH: NORMAL
BUN SERPL-MCNC: 8 MG/DL (ref 8–22)
CALCIUM SERPL-MCNC: 9.5 MG/DL (ref 8.5–10.5)
CHLORIDE SERPL-SCNC: 114 MMOL/L (ref 96–112)
CO2 SERPL-SCNC: 18 MMOL/L (ref 20–33)
CREAT SERPL-MCNC: 0.71 MG/DL (ref 0.5–1.4)
EOSINOPHIL # BLD AUTO: 0.07 K/UL (ref 0–0.51)
EOSINOPHIL NFR BLD: 1.8 % (ref 0–6.9)
ERYTHROCYTE [DISTWIDTH] IN BLOOD BY AUTOMATED COUNT: 48.8 FL (ref 35.9–50)
GLOBULIN SER CALC-MCNC: 2.3 G/DL (ref 1.9–3.5)
GLUCOSE SERPL-MCNC: 117 MG/DL (ref 65–99)
HCG SERPL QL: NEGATIVE
HCT VFR BLD AUTO: 38.1 % (ref 37–47)
HGB BLD-MCNC: 11.9 G/DL (ref 12–16)
IMM GRANULOCYTES # BLD AUTO: 0.03 K/UL (ref 0–0.11)
IMM GRANULOCYTES NFR BLD AUTO: 0.8 % (ref 0–0.9)
LYMPHOCYTES # BLD AUTO: 1.2 K/UL (ref 1–4.8)
LYMPHOCYTES NFR BLD: 30.5 % (ref 22–41)
MCH RBC QN AUTO: 28.8 PG (ref 27–33)
MCHC RBC AUTO-ENTMCNC: 31.2 G/DL (ref 33.6–35)
MCV RBC AUTO: 92.3 FL (ref 81.4–97.8)
MONOCYTES # BLD AUTO: 0.32 K/UL (ref 0–0.85)
MONOCYTES NFR BLD AUTO: 8.1 % (ref 0–13.4)
NEUTROPHILS # BLD AUTO: 2.29 K/UL (ref 2–7.15)
NEUTROPHILS NFR BLD: 58 % (ref 44–72)
NRBC # BLD AUTO: 0 K/UL
NRBC BLD-RTO: 0 /100 WBC
PLATELET # BLD AUTO: 217 K/UL (ref 164–446)
PMV BLD AUTO: 12.4 FL (ref 9–12.9)
POTASSIUM SERPL-SCNC: 4 MMOL/L (ref 3.6–5.5)
PROT SERPL-MCNC: 6.3 G/DL (ref 6–8.2)
RBC # BLD AUTO: 4.13 M/UL (ref 4.2–5.4)
SODIUM SERPL-SCNC: 143 MMOL/L (ref 135–145)
WBC # BLD AUTO: 3.9 K/UL (ref 4.8–10.8)

## 2021-03-24 PROCEDURE — 99284 EMERGENCY DEPT VISIT MOD MDM: CPT

## 2021-03-24 PROCEDURE — 80053 COMPREHEN METABOLIC PANEL: CPT

## 2021-03-24 PROCEDURE — 85025 COMPLETE CBC W/AUTO DIFF WBC: CPT

## 2021-03-24 PROCEDURE — 700117 HCHG RX CONTRAST REV CODE 255: Performed by: EMERGENCY MEDICINE

## 2021-03-24 PROCEDURE — 96374 THER/PROPH/DIAG INJ IV PUSH: CPT | Mod: XU

## 2021-03-24 PROCEDURE — 70487 CT MAXILLOFACIAL W/DYE: CPT

## 2021-03-24 PROCEDURE — 84703 CHORIONIC GONADOTROPIN ASSAY: CPT

## 2021-03-24 PROCEDURE — 700111 HCHG RX REV CODE 636 W/ 250 OVERRIDE (IP): Performed by: EMERGENCY MEDICINE

## 2021-03-24 RX ORDER — ONDANSETRON 2 MG/ML
4 INJECTION INTRAMUSCULAR; INTRAVENOUS ONCE
Status: COMPLETED | OUTPATIENT
Start: 2021-03-24 | End: 2021-03-24

## 2021-03-24 RX ORDER — AZITHROMYCIN 250 MG/1
TABLET, FILM COATED ORAL
Qty: 6 TABLET | Refills: 0 | Status: SHIPPED | OUTPATIENT
Start: 2021-03-24 | End: 2021-04-06

## 2021-03-24 RX ADMIN — ONDANSETRON 4 MG: 2 INJECTION INTRAMUSCULAR; INTRAVENOUS at 14:29

## 2021-03-24 RX ADMIN — IOHEXOL 75 ML: 350 INJECTION, SOLUTION INTRAVENOUS at 15:50

## 2021-03-24 ASSESSMENT — FIBROSIS 4 INDEX: FIB4 SCORE: 0.44

## 2021-03-24 NOTE — ED PROVIDER NOTES
"CHIEF COMPLAINT  Chief Complaint   Patient presents with   • Dental Pain   • Facial Swelling       HPI  Kristin Balderrama is a 30 y.o. female who presents with right facial swelling for about 5 to 6 weeks.  She states she is followed by Dr. Loyola who is her dentist.  She has had multiple rounds of antibiotics for her teeth and apparently nothing has changed.  She notes a lymph node that is painful in her right neck.  No fever.  No rash.  No discharge around her teeth in her mouth and no definite pain in a particular tooth.  No sore throat.    REVIEW OF SYSTEMS  No fever, no body aches, no vomiting    PAST MEDICAL HISTORY  Past Medical History:   Diagnosis Date   • Abdominal pain    • Anginal syndrome     random chest pain especially with tachycardia   • Apnea, sleep    • Arrhythmia     \"sinus tachycardia\", cariologist, Dr. Kumar; ablation 2/2016   • Arthritis     osteo   • ASTHMA     when around smoke   • Atrial fibrillation (HCC)    • Back pain    • Borderline personality disorder ([facility])    • Breath shortness     with tachycardia   • Bronchitis    • Cardiac arrhythmia    • Chickenpox    • Chronic UTI 9/18/2010   • COPD (chronic obstructive pulmonary disease) ([facility])    • Cough    • Daytime sleepiness    • Dental disorder 03/08/2021    infected tooth   • Depression    • Diabetes (HCC)    • Diarrhea     incontinece   • Disorder of thyroid    • Fall    • Fatigue    • Frequent headaches    • Gasping for breath    • Gynecological disorder     PCOS   • Headache(784.0)    • Heart burn    • Heart murmur    • History of falling    • Indigestion    • Migraine    • Mitochondrial disease (HCC)    • 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HCC)    • Nausea    • Obesity    • Other fatigue 6/29/2020   • Pain 08-15-12    back, 7/10   • Painful joint    • PCOS (polycystic ovarian syndrome)    • Pneumonia 2012 12/2020   • Psychosis ([facility])    • Ringing in ears    • Scoliosis    • Shortness of breath     O2 as needed   • Sinus tachycardia " "10/31/2013   • Sleep apnea     CPAP \"pulmonary doctor took me off mid year 2016\"   • Snoring    • Tonsillitis    • 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HCC)    • Tuberculosis     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   • Urinary bladder disorder     Suprapubic cath   • Urinary incontinence    • Weakness    • Wears glasses        FAMILY HISTORY  Family History   Problem Relation Age of Onset   • Hypertension Mother    • Sleep Apnea Mother    • Heart Disease Mother    • Other Mother         hypothryod   • Hypertension Maternal Uncle    • Heart Disease Maternal Grandmother    • Hypertension Maternal Grandmother    • No Known Problems Sister    • Other Sister         Narcolepsy;fibromyalsia;bone;nerve   • Genitourinary () Problems Sister         endometriosis       SOCIAL HISTORY  Social History     Socioeconomic History   • Marital status: Single     Spouse name: Not on file   • Number of children: Not on file   • Years of education: Not on file   • Highest education level: Not on file   Occupational History   • Not on file   Tobacco Use   • Smoking status: Never Smoker   • Smokeless tobacco: Never Used   Substance and Sexual Activity   • Alcohol use: No     Alcohol/week: 0.0 oz   • Drug use: Not Currently     Frequency: 7.0 times per week     Types: Marijuana   • Sexual activity: Not Currently     Birth control/protection: Pill   Other Topics Concern   • Not on file   Social History Narrative    ** Merged History Encounter **          Social Determinants of Health     Financial Resource Strain: Low Risk    • Difficulty of Paying Living Expenses: Not hard at all   Food Insecurity: No Food Insecurity   • Worried About Running Out of Food in the Last Year: Never true   • Ran Out of Food in the Last Year: Never true   Transportation Needs: No Transportation Needs   • Lack of Transportation (Medical): No   • Lack of Transportation (Non-Medical): No   Physical Activity:    • Days of Exercise per Week:    • Minutes of Exercise per " Session:    Stress:    • Feeling of Stress :    Social Connections:    • Frequency of Communication with Friends and Family:    • Frequency of Social Gatherings with Friends and Family:    • Attends [employer]ars[employer] Services:    • Active Member of Clubs or Organizations:    • Attends Club or Organization Meetings:    • Marital Status:    Intimate Partner Violence:    • Fear of Current or Ex-Partner:    • Emotionally Abused:    • Physically Abused:    • Sexually Abused:        SURGICAL HISTORY  Past Surgical History:   Procedure Laterality Date   • BOWEL STIMULATOR INSERTION  3/10/2021    Procedure: INSERTION, ELECTRODE LEADS AND PULSE GENERATOR, NEUROSTIMULATOR, SACRAL - REMOVAL OF INTERSTIM WITH REPLACEMENT OF SACRAL NEUROMODULATION DEVICE;  Surgeon: Joe Noyola M.D.;  Location: SURGERY [facility];  Service: General   • MUSCLE BIOPSY Right 1/26/2017    Procedure: MUSCLE BIOPSY - THIGH;  Surgeon: Isidro Vigil M.D.;  Location: [facility];  Service:    • GASTROSCOPY WITH BALLOON DILATATION N/A 1/4/2017    Procedure: GASTROSCOPY WITH DILATATION;  Surgeon: Torres Vargas M.D.;  Location: [facility];  Service:    • BOWEL STIMULATOR INSERTION  7/13/2016    Procedure: BOWEL STIMULATOR INSERTION FOR PERMANENT INTERSTIM SACRAL IMPLANT;  Surgeon: Joe Noyola M.D.;  Location: SURGERY [facility];  Service:    • RECOVERY  1/27/2016    Procedure: CATH LAB EP STUDY WITH SINUS NODE MODIFICATION LA;  Surgeon: Recoveryonly Surgery;  Location: SURGERY PRE-POST PROC UNIT [facility];  Service:    • OTHER CARDIAC SURGERY  1/2016    cardiac ablation   • NEURO DEST FACET L/S W/IG SNGL  4/21/2015    Performed by Reza Tabor at [facility]   • LUMBAR FUSION ANTERIOR  8/21/2012    Performed by SUSIE SAWANT at [facility]   • ALYSSA BY LAPAROSCOPY  8/29/2010    Performed by SHAYY JOHNS at [facility]S   • LAMINOTOMY     • OTHER ABDOMINAL SURGERY     • TONSILLECTOMY       "tonsillectomy       CURRENT MEDICATIONS  Home Medications    **Home medications have not yet been reviewed for this encounter**         ALLERGIES  Allergies   Allergen Reactions   • Depakote [Divalproex Sodium] Unspecified     Muscle spasms/muscle pain and weakness     • Amitriptyline Unspecified     Headaches     • Aripiprazole [Abilify] Unspecified     Headaches/muscle twitching     • Clindamycin Nausea     Even with food     • Flagyl [Metronidazole Hcl] Unspecified     \"eye problems\"     • Flomax [Tamsulosin Hydrochloride] Swelling   • Levaquin Unspecified     Severe muscle cramps in legs  RXN=unknown   • Metformin Unspecified     Increased lactic acid      • Tamsulosin Swelling     Swelling of legs   • Tape Rash     Tears skin off  coban with Tegaderm tape ok intermittently  RXN=ongoing   • Vancomycin Itching     Pt becomes flushed in face and chest.   RXN=7/10/16   • Wound Dressing Adhesive Hives     By pt report   • Ampicillin Rash     Pt reports that she received a rash    • Ciprofloxacin Rash         • Keflex Rash     Pt states she gets a rash with this medication  Tolerates ceftriaxone  Can take with Benadryl   • Levofloxacin Unspecified     Leg muscle cramps   • Metronidazole Rash     \"Vision problems\"   • Penicillins Hives     Can take with Benadryl   • Sulfa Drugs Itching and Myalgia     Muscle pain and weakness   • Valproic Acid Rash     .       PHYSICAL EXAM  VITAL SIGNS: /70   Pulse 80   Temp 36.4 °C (97.5 °F) (Temporal)   Resp 16   Ht 1.651 m (5' 5\")   Wt 111 kg (244 lb 11.4 oz)   SpO2 96%   BMI 40.72 kg/m²      Constitutional: Well developed, Well nourished, No acute distress, Non-toxic appearance.   HENT: Normocephalic, Atraumatic, no definite facial swelling is appreciated but given the patient's body habitus it is difficult to discern, posterior pharynx is unremarkable with no erythema or exudates or significant soft tissue swelling/asymmetry, there is no focal dental tenderness to " palpation and no swelling along the alveolar ridge, there is a lymph node that is tender in the anterior chain on the right but otherwise no sublingual swelling, submandibular swelling or neck swelling is appreciated  Cardiovascular: Regular pulse  Lungs: No respiratory distress  Skin: Warm, Dry, no rash  Extremities: No edema  Neurologic: Alert, appropriate, follows commands  Psychiatric: Affect normal    RADIOLOGY/PROCEDURES  CT-MAXILLOFACIAL WITH PLUS RECONS   Final Result      1.  No evidence of facial or perimandibular abscess or cellulitis.      2.  No anterior or posterior cervical chain adenopathy.        Results for orders placed or performed during the hospital encounter of 03/24/21   CBC WITH DIFFERENTIAL   Result Value Ref Range    WBC 3.9 (L) 4.8 - 10.8 K/uL    RBC 4.13 (L) 4.20 - 5.40 M/uL    Hemoglobin 11.9 (L) 12.0 - 16.0 g/dL    Hematocrit 38.1 37.0 - 47.0 %    MCV 92.3 81.4 - 97.8 fL    MCH 28.8 27.0 - 33.0 pg    MCHC 31.2 (L) 33.6 - 35.0 g/dL    RDW 48.8 35.9 - 50.0 fL    Platelet Count 217 164 - 446 K/uL    MPV 12.4 9.0 - 12.9 fL    Neutrophils-Polys 58.00 44.00 - 72.00 %    Lymphocytes 30.50 22.00 - 41.00 %    Monocytes 8.10 0.00 - 13.40 %    Eosinophils 1.80 0.00 - 6.90 %    Basophils 0.80 0.00 - 1.80 %    Immature Granulocytes 0.80 0.00 - 0.90 %    Nucleated RBC 0.00 /100 WBC    Neutrophils (Absolute) 2.29 2.00 - 7.15 K/uL    Lymphs (Absolute) 1.20 1.00 - 4.80 K/uL    Monos (Absolute) 0.32 0.00 - 0.85 K/uL    Eos (Absolute) 0.07 0.00 - 0.51 K/uL    Baso (Absolute) 0.03 0.00 - 0.12 K/uL    Immature Granulocytes (abs) 0.03 0.00 - 0.11 K/uL    NRBC (Absolute) 0.00 K/uL   COMP METABOLIC PANEL   Result Value Ref Range    Sodium 143 135 - 145 mmol/L    Potassium 4.0 3.6 - 5.5 mmol/L    Chloride 114 (H) 96 - 112 mmol/L    Co2 18 (L) 20 - 33 mmol/L    Anion Gap 11.0 7.0 - 16.0    Glucose 117 (H) 65 - 99 mg/dL    Bun 8 8 - 22 mg/dL    Creatinine 0.71 0.50 - 1.40 mg/dL    Calcium 9.5 8.5 - 10.5 mg/dL     AST(SGOT) 14 12 - 45 U/L    ALT(SGPT) 21 2 - 50 U/L    Alkaline Phosphatase 84 30 - 99 U/L    Total Bilirubin 0.3 0.1 - 1.5 mg/dL    Albumin 4.0 3.2 - 4.9 g/dL    Total Protein 6.3 6.0 - 8.2 g/dL    Globulin 2.3 1.9 - 3.5 g/dL    A-G Ratio 1.7 g/dL   HCG QUAL SERUM   Result Value Ref Range    Beta-Hcg Qualitative Serum Negative Negative   ESTIMATED GFR   Result Value Ref Range    GFR If African American >60 >60 mL/min/1.73 m 2    GFR If Non African American >60 >60 mL/min/1.73 m 2     *Note: Due to a large number of results and/or encounters for the requested time period, some results have not been displayed. A complete set of results can be found in Results Review.         COURSE & MEDICAL DECISION MAKING  Pertinent Labs & Imaging studies reviewed. (See chart for details)  This is a 30-year-old female who presents with about 6 weeks of apparent facial swelling on the right side.  She has associated pain.  At this point I do not appreciate any definite facial swelling that is asymmetric.  Patient underwent CT imaging of her face which demonstrates no significant asymmetry, no evidence of parotid gland infection or swelling, no evidence of cellulitis or abscess odontogenic or otherwise & no significant lymphadenopathy.  Patient's white count is borderline low at 3.9.  This could suggest a viral illness.  Given the duration of the patient's symptoms I doubt a bacterial etiology.  The patient's vitals are reassuring and she is afebrile.  Case discussed with Dr. Loyola who is the patient's dentist-the patient has already seen an oral surgeon who pulled one of her molars suspecting that it may be the source of her symptoms.  However her symptoms have not improved.  Patient will be referred to ENT and in consultation with Dr. Loyola we will place patient on azithromycin in the interim if she has multiple medication allergies.    FINAL IMPRESSION  1. Facial pain  2.   3.         Electronically signed by: Terence Ruff M.D.,  3/24/2021 1:45 PM

## 2021-03-24 NOTE — ED TRIAGE NOTES
"Chief Complaint   Patient presents with   • Dental Pain   • Facial Swelling     31 yo female ambulatory to triage for above complaint. Pt reports R sided dental pain x 5 wks, had tooth extracted 2 wks ago and was placed on PO ABX, pt now reports R sided facial swelling since yesterday. Slight facial swelling noted, airway intact, pt speaking in full sentences.    Educated on triage process, encourage to inform staff of any changes.     /70   Pulse 80   Temp 36.4 °C (97.5 °F) (Temporal)   Resp 16   Ht 1.651 m (5' 5\")   Wt 111 kg (244 lb 11.4 oz)   SpO2 96%   BMI 40.72 kg/m²   "

## 2021-03-24 NOTE — ED NOTES
Pt with swelling of the neck, pt speaking in full sentences with no drooling or stridor noted. Triage RN aware.

## 2021-03-24 NOTE — DISCHARGE INSTRUCTIONS
Please follow-up with your nose and throat.  Take your antibiotics as written.  Return for increased swelling or other concerns in the interim.

## 2021-04-03 ENCOUNTER — NURSE TRIAGE (OUTPATIENT)
Dept: HEALTH INFORMATION MANAGEMENT | Facility: OTHER | Age: 31
End: 2021-04-03

## 2021-04-03 NOTE — TELEPHONE ENCOUNTER
Regarding: hand issues  ----- Message from Danae Reyes sent at 4/3/2021  1:33 PM PDT -----  Hand issues/numb/hurt/red/painful

## 2021-04-03 NOTE — TELEPHONE ENCOUNTER
"Pt is reporting hand and wrist discomfort in both hands.  Red, swollen, numbness, tingling. Pt thinks she has arthritis. She has follow up with PCP on Monday.  Encouraged to apply heat and take tylenol.  Ibuprofen is ok if not contraindicated.  Pt reports she has had kidney problems in the past but states kidney function tests are normal now.    She is also reporting swollen feet.    Reason for Disposition  • [1] MODERATE pain (e.g., interferes with normal activities) AND [2] present > 3 days    Additional Information  • Negative: [1] Similar pain previously AND [2] it was from \"heart attack\"  • Negative: [1] Similar pain previously AND [2] it was from \"angina\" AND [3] not relieved by nitroglycerin  • Negative: Sounds like a life-threatening emergency to the triager  • Negative: Followed a hand or wrist injury  • Negative: Chest pain  • Negative: Caused by an animal bite  • Negative: Caused by a human bite  • Negative: Wound looks infected  • Negative: Finger pain is main symptom  • Negative: Arm pain is main symptom  • Negative: [1] Swollen joint AND [2] fever  • Negative: [1] Red area or streak AND [2] fever  • Negative: Patient sounds very sick or weak to the triager  • Negative: [1] Looks infected (spreading redness, pus) AND [2] large red area (> 2 in. or 5 cm)  • Negative: Weakness (i.e., loss of strength) of new onset in hand or fingers  (Exceptions: not truly weak, hand feels weak because of pain; weakness present > 2 weeks)  • Negative: Numbness (i.e., loss of sensation) in hand or fingers (Exception: just tingling; numbness present > 2 weeks)  • Negative: Looks like a boil, infected sore, deep ulcer or other infected rash (spreading redness, pus)  • Negative: [1] Localized rash is very painful AND [2] no fever  • Negative: [1] SEVERE pain (e.g., excruciating, unable to use hand at all) AND [2] not improved after 2 hours of pain medicine    Answer Assessment - Initial Assessment Questions  1. ONSET: \"When " "did the pain start?\"      About a month ago  2. LOCATION: \"Where is the pain located?\"      Bilateral hand and wrist pain in the joints  3. PAIN: \"How bad is the pain?\" (Scale 1-10; or mild, moderate, severe)    - MILD (1-3): doesn't interfere with normal activities    - MODERATE (4-7): interferes with normal activities (e.g., work or school) or awakens from sleep    - SEVERE (8-10): excruciating pain, unable to use hand at all      8/10  4. WORK OR EXERCISE: \"Has there been any recent work or exercise that involved this part of the body?\"      no  5. CAUSE: \"What do you think is causing the pain?\"      She thinks arthritis  6. AGGRAVATING FACTORS: \"What makes the pain worse?\" (e.g., using computer)      Gripping things for more than a few minutes  7. OTHER SYMPTOMS: \"Do you have any other symptoms?\" (e.g., neck pain, swelling, rash, numbness, fever)      Numbness, swelling and redness  8. PREGNANCY: \"Is there any chance you are pregnant?\" \"When was your last menstrual period?\"  No    Protocols used: HAND AND WRIST PAIN-A-AH      "

## 2021-04-05 ENCOUNTER — OFFICE VISIT (OUTPATIENT)
Dept: MEDICAL GROUP | Facility: MEDICAL CENTER | Age: 31
End: 2021-04-05
Payer: MEDICARE

## 2021-04-05 ENCOUNTER — OFFICE VISIT (OUTPATIENT)
Dept: PHYSICAL MEDICINE AND REHAB | Facility: REHABILITATION | Age: 31
End: 2021-04-05
Payer: MEDICARE

## 2021-04-05 VITALS
DIASTOLIC BLOOD PRESSURE: 64 MMHG | TEMPERATURE: 98.5 F | OXYGEN SATURATION: 95 % | SYSTOLIC BLOOD PRESSURE: 120 MMHG | WEIGHT: 240.6 LBS | HEART RATE: 79 BPM | HEIGHT: 65 IN | BODY MASS INDEX: 40.08 KG/M2 | RESPIRATION RATE: 20 BRPM

## 2021-04-05 VITALS
SYSTOLIC BLOOD PRESSURE: 116 MMHG | WEIGHT: 244 LBS | BODY MASS INDEX: 40.65 KG/M2 | OXYGEN SATURATION: 98 % | HEART RATE: 80 BPM | HEIGHT: 65 IN | TEMPERATURE: 97.6 F | DIASTOLIC BLOOD PRESSURE: 62 MMHG | RESPIRATION RATE: 18 BRPM

## 2021-04-05 DIAGNOSIS — M79.641 BILATERAL HAND PAIN: ICD-10-CM

## 2021-04-05 DIAGNOSIS — G37.3 TRANSVERSE MYELITIS (HCC): ICD-10-CM

## 2021-04-05 DIAGNOSIS — G63 POLYNEUROPATHY ASSOCIATED WITH UNDERLYING DISEASE (HCC): ICD-10-CM

## 2021-04-05 DIAGNOSIS — M79.642 BILATERAL HAND PAIN: ICD-10-CM

## 2021-04-05 DIAGNOSIS — R25.2 SPASTICITY: ICD-10-CM

## 2021-04-05 DIAGNOSIS — M79.2 NEUROPATHIC PAIN: Primary | ICD-10-CM

## 2021-04-05 PROCEDURE — 99214 OFFICE O/P EST MOD 30 MIN: CPT | Performed by: PHYSICAL MEDICINE & REHABILITATION

## 2021-04-05 PROCEDURE — 99213 OFFICE O/P EST LOW 20 MIN: CPT | Performed by: FAMILY MEDICINE

## 2021-04-05 RX ORDER — PREGABALIN 300 MG/1
300 CAPSULE ORAL 2 TIMES DAILY
Qty: 60 CAPSULE | Refills: 2 | Status: ON HOLD
Start: 2021-04-05 | End: 2021-04-28

## 2021-04-05 RX ORDER — BACLOFEN 10 MG/1
10 TABLET ORAL 3 TIMES DAILY
Qty: 90 TABLET | Refills: 2 | Status: SHIPPED | OUTPATIENT
Start: 2021-04-05 | End: 2021-12-01

## 2021-04-05 ASSESSMENT — ENCOUNTER SYMPTOMS
FALLS: 0
CONSTIPATION: 0
DIARRHEA: 0
BRUISES/BLEEDS EASILY: 0
CHILLS: 0
FEVER: 0
SORE THROAT: 0
SHORTNESS OF BREATH: 0
MEMORY LOSS: 0
PALPITATIONS: 0
COUGH: 0

## 2021-04-05 ASSESSMENT — FIBROSIS 4 INDEX
FIB4 SCORE: 0.44
FIB4 SCORE: 0.44

## 2021-04-05 NOTE — PATIENT INSTRUCTIONS
Low-Purine Eating Plan  A low-purine eating plan involves making food choices to limit your intake of purine. Purine is a kind of uric acid. Too much uric acid in your blood can cause certain conditions, such as gout and kidney stones. Eating a low-purine diet can help control these conditions.  What are tips for following this plan?  Reading food labels    · Avoid foods with saturated or Trans fat.  · Check the ingredient list of grains-based foods, such as bread and cereal, to make sure that they contain whole grains.  · Check the ingredient list of sauces or soups to make sure they do not contain meat or fish.  · When choosing soft drinks, check the ingredient list to make sure they do not contain high-fructose corn syrup.  Shopping  · Buy plenty of fresh fruits and vegetables.  · Avoid buying canned or fresh fish.  · Buy dairy products labeled as low-fat or nonfat.  · Avoid buying premade or processed foods. These foods are often high in fat, salt (sodium), and added sugar.  Cooking  · Use olive oil instead of butter when cooking. Oils like olive oil, canola oil, and sunflower oil contain healthy fats.  Meal planning  · Learn which foods do or do not affect you. If you find out that a food tends to cause your gout symptoms to flare up, avoid eating that food. You can enjoy foods that do not cause problems. If you have any questions about a food item, talk with your dietitian or health care provider.  · Limit foods high in fat, especially saturated fat. Fat makes it harder for your body to get rid of uric acid.  · Choose foods that are lower in fat and are lean sources of protein.  General guidelines  · Limit alcohol intake to no more than 1 drink a day for nonpregnant women and 2 drinks a day for men. One drink equals 12 oz of beer, 5 oz of wine, or 1½ oz of hard liquor. Alcohol can affect the way your body gets rid of uric acid.  · Drink plenty of water to keep your urine clear or pale yellow. Fluids can help  remove uric acid from your body.  · If directed by your health care provider, take a vitamin C supplement.  · Work with your health care provider and dietitian to develop a plan to achieve or maintain a healthy weight. Losing weight can help reduce uric acid in your blood.  What foods are recommended?  The items listed may not be a complete list. Talk with your dietitian about what dietary choices are best for you.  Foods low in purines  Foods low in purines do not need to be limited. These include:  · All fruits.  · All low-purine vegetables, pickles, and olives.  · Breads, pasta, rice, cornbread, and popcorn. Cake and other baked goods.  · All dairy foods.  · Eggs, nuts, and nut butters.  · Spices and condiments, such as salt, herbs, and vinegar.  · Plant oils, butter, and margarine.  · Water, sugar-free soft drinks, tea, coffee, and cocoa.  · Vegetable-based soups, broths, sauces, and gravies.  Foods moderate in purines  Foods moderate in purines should be limited to the amounts listed.  · ½ cup of asparagus, cauliflower, spinach, mushrooms, or green peas, each day.  · 2/3 cup uncooked oatmeal, each day.  · ¼ cup dry wheat bran or wheat germ, each day.  · 2-3 ounces of meat or poultry, each day.  · 4-6 ounces of shellfish, such as crab, lobster, oysters, or shrimp, each day.  · 1 cup cooked beans, peas, or lentils, each day.  · Soup, broths, or bouillon made from meat or fish. Limit these foods as much as possible.  What foods are not recommended?  The items listed may not be a complete list. Talk with your dietitian about what dietary choices are best for you.  Limit your intake of foods high in purines, including:  · Beer and other alcohol.  · Meat-based gravy or sauce.  · Canned or fresh fish, such as:  ? Anchovies, sardines, herring, and tuna.  ? Mussels and scallops.  ? Codfish, trout, and prince.  · Fleming.  · Organ meats, such as:  ? Liver or kidney.  ? Tripe.  ? Sweetbreads (thymus gland or  pancreas).  · Wild game or goose.  · Yeast or yeast extract supplements.  · Drinks sweetened with high-fructose corn syrup.  Summary  · Eating a low-purine diet can help control conditions caused by too much uric acid in the body, such as gout or kidney stones.  · Choose low-purine foods, limit alcohol, and limit foods high in fat.  · You will learn over time which foods do or do not affect you. If you find out that a food tends to cause your gout symptoms to flare up, avoid eating that food.  This information is not intended to replace advice given to you by your health care provider. Make sure you discuss any questions you have with your health care provider.  Document Released: 04/13/2012 Document Revised: 11/30/2018 Document Reviewed: 01/31/2018  [employer] Patient Education © 2020 [employer]

## 2021-04-05 NOTE — PROGRESS NOTES
[facility]  [facility]  [address] [place] [address]  Ph: [phone]    FOLLOW UP PATIENT EVALUATION    Patient Name: Kristin Balderrama   Patient : 1990  Patient Age: 30 y.o.     Examining Physician: Dr. Kayla Wise, DO    PM&R History to Date - Background Information:  Dates of Admission/Discharge to ARU: None    SUBJECTIVE:   Patient Identification: Kristin Balderrama is a 30 y.o. 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  Evaluated in the emergency department  for ankle and hip pain on the left.  -Patient then hospitalized 3/4/2021 for InterStim to implant.  Patient is s/p incision and implantation of sacral neuromodulation system for fecal and urinary incontinence by Dr. Noyola.  -Patient reported to the emergency department for neck pain/swelling after dental extraction.  -Emergency department visit 3/17 for ground-level fall.  CT head and thoracic x-rays negative.  Patient had another fall 3/21 for which she went to the emergency department.  -We reported to the emergency department 3/24 for dental pain and facial swelling continuing after her dental procedure.  - Nerve pain has been improved on the Lyrica.   - Was in [facility]  "yesterday. Has diffuse joint swelling and pain in her hands. Started in the R moved to the L then moved to toes.   - Baclofen does help her spasticity. Has been off of it for a month.     Review of Systems:  Review of Systems   Constitutional: Negative for chills and fever.   HENT: Negative for congestion and sore throat.    Respiratory: Negative for cough and shortness of breath.    Cardiovascular: Negative for palpitations and leg swelling.   Gastrointestinal: Negative for constipation and diarrhea.   Musculoskeletal: Positive for joint pain. Negative for falls.        Joint swelling.   Neurological:        + worsening spasticity   Endo/Heme/Allergies: Does not bruise/bleed easily.   Psychiatric/Behavioral: Negative for memory loss.      All other pertinent positive review of systems are noted above in HPI.   All other systems reviewed and are negative.    Past Medical History:  Past Medical History:   Diagnosis Date   • Dental disorder 03/08/2021    infected tooth   • Other fatigue 6/29/2020   • Sinus tachycardia 10/31/2013   • Pain 08-15-12    back, 7/10   • Chronic UTI 9/18/2010   • Abdominal pain    • Anginal syndrome     random chest pain especially with tachycardia   • Apnea, sleep    • Arrhythmia     \"sinus tachycardia\", cariologist, Dr. Kumar; ablation 2/2016   • Arthritis     osteo   • ASTHMA     when around smoke   • Atrial fibrillation (HCC)    • Back pain    • Borderline personality disorder (HCC)    • Breath shortness     with tachycardia   • Bronchitis    • Cardiac arrhythmia    • Chickenpox    • COPD (chronic obstructive pulmonary disease) (HCC)    • Cough    • Daytime sleepiness    • Depression    • Diabetes (HCC)    • Diarrhea     incontinece   • Disorder of thyroid    • Fall    • Fatigue    • Frequent headaches    • Gasping for breath    • Gynecological disorder     PCOS   • Headache(784.0)    • Heart burn    • Heart murmur    • History of falling    • Indigestion    • Migraine    • Mitochondrial " "disease (HCC)    • 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HCC)    • Nausea    • Obesity    • Painful joint    • PCOS (polycystic ovarian syndrome)    • Pneumonia 2012 12/2020   • Psychosis ([facility])    • Ringing in ears    • Scoliosis    • Shortness of breath     O2 as needed   • Sleep apnea     CPAP \"pulmonary doctor took me off mid year 2016\"   • Snoring    • Tonsillitis    • 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HCC)    • Tuberculosis     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   • Urinary bladder disorder     Suprapubic cath   • Urinary incontinence    • Weakness    • Wears glasses       Past Surgical History:   Procedure Laterality Date   • BOWEL STIMULATOR INSERTION  3/10/2021    Procedure: INSERTION, ELECTRODE LEADS AND PULSE GENERATOR, NEUROSTIMULATOR, SACRAL - REMOVAL OF INTERSTIM WITH REPLACEMENT OF SACRAL NEUROMODULATION DEVICE;  Surgeon: Joe Noyola M.D.;  Location: [facility];  Service: General   • MUSCLE BIOPSY Right 1/26/2017    Procedure: MUSCLE BIOPSY - THIGH;  Surgeon: Isidro Vigil M.D.;  Location: [facility];  Service:    • GASTROSCOPY WITH BALLOON DILATATION N/A 1/4/2017    Procedure: GASTROSCOPY WITH DILATATION;  Surgeon: Torres Vargas M.D.;  Location: [facility];  Service:    • BOWEL STIMULATOR INSERTION  7/13/2016    Procedure: BOWEL STIMULATOR INSERTION FOR PERMANENT INTERSTIM SACRAL IMPLANT;  Surgeon: Joe Noyola M.D.;  Location: [facility];  Service:    • RECOVERY  1/27/2016    Procedure: CATH LAB EP STUDY WITH SINUS NODE MODIFICATION LA;  Surgeon: Recoveryonly Surgery;  [place] [facility]  Service:    • OTHER CARDIAC SURGERY  1/2016    cardiac ablation   • NEURO DEST FACET L/S W/IG SNGL  4/21/2015    Performed by Reza Tabor at [facility]   • LUMBAR FUSION ANTERIOR  8/21/2012    Performed by SUSIE SAWANT at [facility]   • ALYSSA BY LAPAROSCOPY  8/29/2010    Performed by SHAYY JOHNS at [facility]" TOWER ORS   • LAMINOTOMY     • OTHER ABDOMINAL SURGERY     • TONSILLECTOMY      tonsillectomy        Current Outpatient Medications:   •  baclofen (LIORESAL) 10 MG Tab, Take 1 tablet by mouth 3 times a day., Disp: 90 tablet, Rfl: 2  •  pregabalin (LYRICA) 300 MG capsule, Take 1 capsule by mouth 2 times a day for 90 days., Disp: 60 capsule, Rfl: 2  •  azithromycin (ZITHROMAX) 250 MG Tab, Take two tabs by mouth on day one, then one tab by mouth daily on days 2-5., Disp: 6 tablet, Rfl: 0  •  ivabradine (CORLANOR) 7.5 MG Tab tablet, Take 1 tablet by mouth 2 times a day, with meals., Disp: 180 tablet, Rfl: 3  •  busPIRone (BUSPAR) 30 MG tablet, Take 1 tablet by mouth twice a day, Disp: 180 tablet, Rfl: 0  •  ziprasidone (GEODON) 40 MG Cap, Take 1 tablet by mouth twice a day, Disp: 180 capsule, Rfl: 0  •  OXcarbazepine (TRILEPTAL) 300 MG Tab, Take 1 tablet by mouth 2 times a day., Disp: 180 tablet, Rfl: 0  •  fluoxetine (PROZAC) 40 MG capsule, Take 1 capsule by mouth every day., Disp: 90 capsule, Rfl: 0  •  traZODone (DESYREL) 100 MG Tab, Take 1 tablet by mouth every bedtime., Disp: 90 tablet, Rfl: 0  •  predniSONE (DELTASONE) 10 MG Tab, Take 10-70 mg by mouth every day. Started 2/27/2021. Take 7 tablets (70 mg) every day for 2 days, then 6 tablets (60 mg) every day for 2 days, then 5 tablets (50 mg) every day for 2 days, then 4 tablets (40 mg) every day for 2 days, then 3 tablets (30 mg) every day for 2 days, then 2 tablets (20 mg) every day for 2 days, then 1 tablet (10 mg) every day for 2 days., Disp: , Rfl:   •  aspirin 81 MG EC tablet, Take 81 mg by mouth every morning., Disp: , Rfl:   •  penicillin v potassium (VEETID) 500 MG Tab, Take 500 mg by mouth 4 times a day. 7 day course started 3/1/2021., Disp: , Rfl:   •  diphenhydrAMINE (BENADRYL ALLERGY) 25 MG capsule, Take 50 mg by mouth 4 times a day as needed (Antibiotic Allergy). 2 capsules = 50 mg. TAKEN WITH EACH ANTIBIOTIC DOSE., Disp: , Rfl:   •  Dulaglutide  (TRULICITY) 1.5 MG/0.5ML Solution Pen-injector, Inject 0.5 mL under the skin every 7 days., Disp: 6 mL, Rfl: 3  •  potassium chloride SA (KDUR) 20 MEQ Tab CR, Take 40 mEq by mouth 2 times a day. 2 tablets = 40 mEq., Disp: , Rfl:   •  mycophenolate (CELLCEPT) 500 MG tablet, Take 2 Tablets by mouth 2 times a day. (Patient taking differently: Take 500 mg by mouth 2 times a day.), Disp: 120 tablet, Rfl: 0  •  esomeprazole (NEXIUM) 40 MG delayed-release capsule, Take 40 mg by mouth every morning., Disp: , Rfl:   •  levothyroxine (SYNTHROID) 100 MCG Tab, Take 100 mcg by mouth Every morning on an empty stomach., Disp: , Rfl:   •  Melatonin 10 MG Tab, Take 10 mg by mouth every bedtime., Disp: , Rfl:   •  albuterol 108 (90 Base) MCG/ACT Aero Soln inhalation aerosol, Inhale 2 Puffs every 6 hours as needed for Shortness of Breath., Disp: 8.5 g, Rfl: 6  •  Mirabegron ER (MYRBETRIQ) 50 MG TABLET SR 24 HR, Take 50 mg by mouth every day. (Patient taking differently: Take 50 mg by mouth every morning.), Disp: 90 Tab, Rfl: 3  •  acetaminophen (TYLENOL) 500 MG Tab, Take 1,000 mg by mouth every 6 hours as needed for Moderate Pain. 2 tablets = 1000 mg., Disp: , Rfl:   •  sodium bicarbonate 325 MG Tab, Take 650 mg by mouth 2 times a day., Disp: , Rfl:   •  topiramate (TOPAMAX) 50 MG tablet, Take 50 mg by mouth 2 times a day., Disp: , Rfl:   •  ondansetron (ZOFRAN ODT) 4 MG TABLET DISPERSIBLE, Take 1 Tab by mouth every 6 hours as needed for Nausea., Disp: 10 Tab, Rfl: 0  •  ipratropium-albuterol (DUONEB) 0.5-2.5 (3) MG/3ML nebulizer solution, Take 3 mL by nebulization every four hours as needed for Shortness of Breath. Nebulizer , Disp: , Rfl:   •  calcium carbonate (TUMS EX) 750 MG chewable tablet, Take 2 Tabs by mouth every day., Disp: 60 Tab, Rfl: 0  •  vitamin D, Ergocalciferol, (DRISDOL) 1.25 MG (18208 UT) Cap capsule, Take 50,000 Units by mouth every Friday. In the morning., Disp: , Rfl:   •  oxybutynin (DITROPAN) 5 MG Tab, Take 1  "Tab by mouth 3 times a day., Disp: 90 Tab, Rfl: 0  Allergies   Allergen Reactions   • Depakote [Divalproex Sodium] Unspecified     Muscle spasms/muscle pain and weakness     • Amitriptyline Unspecified     Headaches     • Aripiprazole [Abilify] Unspecified     Headaches/muscle twitching     • Clindamycin Nausea     Even with food     • Flagyl [Metronidazole Hcl] Unspecified     \"eye problems\"     • Flomax [Tamsulosin Hydrochloride] Swelling   • Levaquin Unspecified     Severe muscle cramps in legs  RXN=unknown   • Metformin Unspecified     Increased lactic acid      • Tamsulosin Swelling     Swelling of legs   • Tape Rash     Tears skin off  coban with Tegaderm tape ok intermittently  RXN=ongoing   • Vancomycin Itching     Pt becomes flushed in face and chest.   RXN=7/10/16   • Wound Dressing Adhesive Hives     By pt report   • Ampicillin Rash     Pt reports that she received a rash    • Ciprofloxacin Rash         • Keflex Rash     Pt states she gets a rash with this medication  Tolerates ceftriaxone  Can take with Benadryl   • Levofloxacin Unspecified     Leg muscle cramps   • Metronidazole Rash     \"Vision problems\"   • Penicillins Hives     Can take with Benadryl   • Sulfa Drugs Itching and Myalgia     Muscle pain and weakness   • Valproic Acid Rash     .        Past Social History:  Social History     Socioeconomic History   • Marital status: Single     Spouse name: Not on file   • Number of children: Not on file   • Years of education: Not on file   • Highest education level: Not on file   Occupational History   • Not on file   Tobacco Use   • Smoking status: Never Smoker   • Smokeless tobacco: Never Used   Substance and Sexual Activity   • Alcohol use: No     Alcohol/week: 0.0 oz   • Drug use: Not Currently     Frequency: 7.0 times per week     Types: Marijuana   • Sexual activity: Not Currently     Birth control/protection: Pill   Other Topics Concern   • Not on file   Social History Narrative    ** Merged " History Encounter **          Social Determinants of Health     Financial Resource Strain: Low Risk    • Difficulty of Paying Living Expenses: Not hard at all   Food Insecurity: No Food Insecurity   • Worried About Running Out of Food in the Last Year: Never true   • Ran Out of Food in the Last Year: Never true   Transportation Needs: No Transportation Needs   • Lack of Transportation (Medical): No   • Lack of Transportation (Non-Medical): No   Physical Activity:    • Days of Exercise per Week:    • Minutes of Exercise per Session:    Stress:    • Feeling of Stress :    Social Connections:    • Frequency of Communication with Friends and Family:    • Frequency of Social Gatherings with Friends and Family:    • Attends [employer] Services:    • Active Member of Clubs or Organizations:    • Attends Club or Organization Meetings:    • Marital Status:    Intimate Partner Violence:    • Fear of Current or Ex-Partner:    • Emotionally Abused:    • Physically Abused:    • Sexually Abused:         Family History:  Family History   Problem Relation Age of Onset   • Hypertension Mother    • Sleep Apnea Mother    • Heart Disease Mother    • Other Mother         hypothryod   • Hypertension Maternal Uncle    • Heart Disease Maternal Grandmother    • Hypertension Maternal Grandmother    • No Known Problems Sister    • Other Sister         Narcolepsy;fibromyalsia;bone;nerve   • Genitourinary () Problems Sister         endometriosis       Depression and Opioid Screening  PHQ-9:  Depression Screen (PHQ-2/PHQ-9) 2/5/2021 2/5/2021 2/6/2021   PHQ-2 Total Score - 1 1   PHQ-2 Total Score - - -   PHQ-2 Total Score 0 - -   PHQ-9 Total Score - 2 2   PHQ-9 Total Score - - -   PHQ-9 Total Score - - -   Some encounter information is confidential and restricted. Go to Review Flowsheets activity to see all data.     Interpretation of PHQ-9 Total Score   Score Severity   1-4 No Depression   5-9 Mild Depression   10-14 Moderate Depression   15-19  Moderately Severe Depression   20-27 Severe Depression     OBJECTIVE:   Vital Signs:  Vitals:    04/05/21 0919   BP: 116/62   Pulse: 80   Resp: 18   Temp: 36.4 °C (97.6 °F)   SpO2: 98%        Pertinent Labs:  Lab Results   Component Value Date/Time    SODIUM 143 03/24/2021 02:02 PM    POTASSIUM 4.0 03/24/2021 02:02 PM    CHLORIDE 114 (H) 03/24/2021 02:02 PM    CO2 18 (L) 03/24/2021 02:02 PM    GLUCOSE 117 (H) 03/24/2021 02:02 PM    BUN 8 03/24/2021 02:02 PM    CREATININE 0.71 03/24/2021 02:02 PM    CREATININE 0.75 (L) 07/20/2010 11:00 AM    BUNCREATRAT 19 07/20/2010 11:00 AM    GLOMRATE >59 07/20/2010 11:00 AM       Lab Results   Component Value Date/Time    HBA1C 4.6 01/20/2021 05:33 AM       Lab Results   Component Value Date/Time    WBC 3.9 (L) 03/24/2021 02:02 PM    WBC 6.1 07/20/2010 11:00 AM    RBC 4.13 (L) 03/24/2021 02:02 PM    RBC 4.38 07/20/2010 11:00 AM    HEMOGLOBIN 11.9 (L) 03/24/2021 02:02 PM    HEMATOCRIT 38.1 03/24/2021 02:02 PM    MCV 92.3 03/24/2021 02:02 PM    MCV 93 07/20/2010 11:00 AM    MCH 28.8 03/24/2021 02:02 PM    MCH 30.1 07/20/2010 11:00 AM    MCHC 31.2 (L) 03/24/2021 02:02 PM    MPV 12.4 03/24/2021 02:02 PM    NEUTSPOLYS 58.00 03/24/2021 02:02 PM    LYMPHOCYTES 30.50 03/24/2021 02:02 PM    MONOCYTES 8.10 03/24/2021 02:02 PM    EOSINOPHILS 1.80 03/24/2021 02:02 PM    BASOPHILS 0.80 03/24/2021 02:02 PM    ANISOCYTOSIS 1+ 07/08/2019 04:04 PM       Lab Results   Component Value Date/Time    ASTSGOT 14 03/24/2021 02:02 PM    ALTSGPT 21 03/24/2021 02:02 PM        Physical Exam:   GEN: No apparent distress  HEENT: Head normocephalic, atraumatic.  Sclera nonicteric bilaterally, no ocular discharge appreciated bilaterally.  CV: Extremities warm and well-perfused, no peripheral edema appreciated bilaterally.  PULMONARY: Breathing nonlabored on room air, no respiratory accessory muscle use.  Not requiring supplemental oxygen.  ABD: Soft, nontender.  SKIN: No appreciable skin breakdown on exposed  areas of skin.  PSYCH: Mood and affect within normal limits.  NEURO: Awake alert.  Conversational.  Logical thought content.  Right AFO donned.  Mild spasticity appreciated bilateral lower extremities though exam minimally impeded by inability for volitional relaxation.    Imaging: No new imaging pertinent to today's visit      ASSESSMENT/PLAN: Kristin Balderrama  is a 30 y.o. female with rehabilitation history significant for chronic myelopathic symptoms including bilateral lower extremity weakness, neuropathic pain, spasticity, neurogenic bladder, neurogenic bowel, here for evaluation. The following plan was discussed with the patient who is in agreement.     Visit Diagnoses     ICD-10-CM   1. Neuropathic pain  M79.2   2. Polyneuropathy associated with underlying disease ([facility])  G63   3. 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[facility])  G37.3   4. Spasticity  R25.2        Rehab/Neuro:   1. History of transverse myelitis vs functional neurologic dysfunction: Followed by neurology  2. History of mechanical low back pain refractory to nonsurgical management s/p L4-5 corpectomy and fusion 8/27/2012 Dr. Stringer  3. History of inflammatory optic neuropathy (CRION syndrome)  -Records reviewed as aforementioned in the HPI.  -Therapy: Not undergoing therapy at this time.    Spasticity:  -Med management: Prescription for baclofen 10 mg 3 times daily.    Neuropathic Pain: Improved pain on Lyrica.  -Med management: Prescription for Lyrica 300 mg twice daily    Rheum: New joint swelling, impairing function.  -Continue follow-up with PCP    Follow up: 3 months for medication refill.    Please note that this dictation was created using voice recognition software. I have made every reasonable attempt to correct obvious errors but there may be errors of grammar and content that I may have overlooked prior to finalization of this note.    Dr. Kayla Wise DO, MS  Department of Physical Medicine & Rehabilitation  Neuro Rehabilitation Clinic  [facility]

## 2021-04-05 NOTE — ASSESSMENT & PLAN NOTE
Ms. Balderrama is a rather medically complicated patient of Dr. Preston's who comes in today complaining of 3 days of bilateral hand pain and occasional swelling with associated redness.  This sometimes is associated with bilateral foot pain, swelling and redness.  Patient states that she was seen in the [facility] emergency department yesterday where she was started on prednisone for this problem.  This does not seem to help.  Labs were taken in the ER which she brings in today.  Her ESR, CRP, white count are all normal.  It appears that a bilateral hand x-ray was also taken but I do not have access to these.

## 2021-04-06 ENCOUNTER — TELEPHONE (OUTPATIENT)
Dept: CARDIOLOGY | Facility: MEDICAL CENTER | Age: 31
End: 2021-04-06

## 2021-04-06 ENCOUNTER — OFFICE VISIT (OUTPATIENT)
Dept: CARDIOLOGY | Facility: MEDICAL CENTER | Age: 31
End: 2021-04-06
Payer: MEDICARE

## 2021-04-06 ENCOUNTER — HOSPITAL ENCOUNTER (OUTPATIENT)
Dept: LAB | Facility: MEDICAL CENTER | Age: 31
End: 2021-04-06
Attending: FAMILY MEDICINE
Payer: MEDICARE

## 2021-04-06 ENCOUNTER — HOSPITAL ENCOUNTER (EMERGENCY)
Facility: MEDICAL CENTER | Age: 31
End: 2021-04-06
Attending: EMERGENCY MEDICINE
Payer: MEDICARE

## 2021-04-06 ENCOUNTER — APPOINTMENT (OUTPATIENT)
Dept: RADIOLOGY | Facility: MEDICAL CENTER | Age: 31
End: 2021-04-06
Attending: EMERGENCY MEDICINE
Payer: MEDICARE

## 2021-04-06 VITALS
SYSTOLIC BLOOD PRESSURE: 122 MMHG | DIASTOLIC BLOOD PRESSURE: 70 MMHG | OXYGEN SATURATION: 98 % | WEIGHT: 244 LBS | HEIGHT: 65 IN | HEART RATE: 85 BPM | RESPIRATION RATE: 12 BRPM | BODY MASS INDEX: 40.65 KG/M2

## 2021-04-06 VITALS
TEMPERATURE: 97.4 F | OXYGEN SATURATION: 99 % | DIASTOLIC BLOOD PRESSURE: 61 MMHG | HEIGHT: 65 IN | SYSTOLIC BLOOD PRESSURE: 113 MMHG | HEART RATE: 59 BPM | WEIGHT: 240 LBS | BODY MASS INDEX: 39.99 KG/M2 | RESPIRATION RATE: 19 BRPM

## 2021-04-06 DIAGNOSIS — R07.89 OTHER CHEST PAIN: ICD-10-CM

## 2021-04-06 DIAGNOSIS — R07.9 CHEST PAIN, UNSPECIFIED TYPE: ICD-10-CM

## 2021-04-06 DIAGNOSIS — I47.10 SVT (SUPRAVENTRICULAR TACHYCARDIA) (HCC): ICD-10-CM

## 2021-04-06 DIAGNOSIS — M79.641 BILATERAL HAND PAIN: ICD-10-CM

## 2021-04-06 DIAGNOSIS — M79.642 BILATERAL HAND PAIN: ICD-10-CM

## 2021-04-06 LAB
ALBUMIN SERPL BCP-MCNC: 4.5 G/DL (ref 3.2–4.9)
ALBUMIN/GLOB SERPL: 2.3 G/DL
ALP SERPL-CCNC: 104 U/L (ref 30–99)
ALT SERPL-CCNC: 25 U/L (ref 2–50)
ANION GAP SERPL CALC-SCNC: 9 MMOL/L (ref 7–16)
AST SERPL-CCNC: 14 U/L (ref 12–45)
BASOPHILS # BLD AUTO: 0.6 % (ref 0–1.8)
BASOPHILS # BLD: 0.04 K/UL (ref 0–0.12)
BILIRUB SERPL-MCNC: 0.2 MG/DL (ref 0.1–1.5)
BUN SERPL-MCNC: 18 MG/DL (ref 8–22)
CALCIUM SERPL-MCNC: 9.3 MG/DL (ref 8.5–10.5)
CHLORIDE SERPL-SCNC: 106 MMOL/L (ref 96–112)
CO2 SERPL-SCNC: 24 MMOL/L (ref 20–33)
CREAT SERPL-MCNC: 0.73 MG/DL (ref 0.5–1.4)
EKG IMPRESSION: NORMAL
EOSINOPHIL # BLD AUTO: 0.04 K/UL (ref 0–0.51)
EOSINOPHIL NFR BLD: 0.6 % (ref 0–6.9)
ERYTHROCYTE [DISTWIDTH] IN BLOOD BY AUTOMATED COUNT: 50 FL (ref 35.9–50)
GLOBULIN SER CALC-MCNC: 2 G/DL (ref 1.9–3.5)
GLUCOSE SERPL-MCNC: 97 MG/DL (ref 65–99)
HCT VFR BLD AUTO: 41.6 % (ref 37–47)
HGB BLD-MCNC: 13.3 G/DL (ref 12–16)
IMM GRANULOCYTES # BLD AUTO: 0.03 K/UL (ref 0–0.11)
IMM GRANULOCYTES NFR BLD AUTO: 0.4 % (ref 0–0.9)
LYMPHOCYTES # BLD AUTO: 1.66 K/UL (ref 1–4.8)
LYMPHOCYTES NFR BLD: 24.5 % (ref 22–41)
MCH RBC QN AUTO: 29.8 PG (ref 27–33)
MCHC RBC AUTO-ENTMCNC: 32 G/DL (ref 33.6–35)
MCV RBC AUTO: 93.1 FL (ref 81.4–97.8)
MONOCYTES # BLD AUTO: 0.59 K/UL (ref 0–0.85)
MONOCYTES NFR BLD AUTO: 8.7 % (ref 0–13.4)
NEUTROPHILS # BLD AUTO: 4.41 K/UL (ref 2–7.15)
NEUTROPHILS NFR BLD: 65.2 % (ref 44–72)
NRBC # BLD AUTO: 0 K/UL
NRBC BLD-RTO: 0 /100 WBC
PLATELET # BLD AUTO: 156 K/UL (ref 164–446)
PMV BLD AUTO: 12.6 FL (ref 9–12.9)
POTASSIUM SERPL-SCNC: 3.6 MMOL/L (ref 3.6–5.5)
PROT SERPL-MCNC: 6.5 G/DL (ref 6–8.2)
RBC # BLD AUTO: 4.47 M/UL (ref 4.2–5.4)
SODIUM SERPL-SCNC: 139 MMOL/L (ref 135–145)
TROPONIN T SERPL-MCNC: <6 NG/L (ref 6–19)
URATE SERPL-MCNC: 5.5 MG/DL (ref 1.9–8.2)
WBC # BLD AUTO: 6.8 K/UL (ref 4.8–10.8)

## 2021-04-06 PROCEDURE — 84484 ASSAY OF TROPONIN QUANT: CPT

## 2021-04-06 PROCEDURE — 99284 EMERGENCY DEPT VISIT MOD MDM: CPT

## 2021-04-06 PROCEDURE — 93000 ELECTROCARDIOGRAM COMPLETE: CPT | Performed by: INTERNAL MEDICINE

## 2021-04-06 PROCEDURE — 700102 HCHG RX REV CODE 250 W/ 637 OVERRIDE(OP): Performed by: EMERGENCY MEDICINE

## 2021-04-06 PROCEDURE — 36415 COLL VENOUS BLD VENIPUNCTURE: CPT

## 2021-04-06 PROCEDURE — A9270 NON-COVERED ITEM OR SERVICE: HCPCS | Performed by: EMERGENCY MEDICINE

## 2021-04-06 PROCEDURE — 84550 ASSAY OF BLOOD/URIC ACID: CPT

## 2021-04-06 PROCEDURE — 99214 OFFICE O/P EST MOD 30 MIN: CPT | Performed by: NURSE PRACTITIONER

## 2021-04-06 PROCEDURE — 93005 ELECTROCARDIOGRAM TRACING: CPT | Performed by: EMERGENCY MEDICINE

## 2021-04-06 PROCEDURE — 86431 RHEUMATOID FACTOR QUANT: CPT

## 2021-04-06 PROCEDURE — 86200 CCP ANTIBODY: CPT

## 2021-04-06 PROCEDURE — 71045 X-RAY EXAM CHEST 1 VIEW: CPT

## 2021-04-06 PROCEDURE — 80053 COMPREHEN METABOLIC PANEL: CPT

## 2021-04-06 PROCEDURE — 85025 COMPLETE CBC W/AUTO DIFF WBC: CPT

## 2021-04-06 RX ORDER — METOPROLOL SUCCINATE 25 MG/1
12.5 TABLET, EXTENDED RELEASE ORAL EVERY EVENING
Qty: 30 TABLET | Refills: 1 | Status: ON HOLD
Start: 2021-04-06 | End: 2021-06-28

## 2021-04-06 RX ORDER — ACETAMINOPHEN 325 MG/1
650 TABLET ORAL ONCE
Status: COMPLETED | OUTPATIENT
Start: 2021-04-06 | End: 2021-04-06

## 2021-04-06 RX ADMIN — ACETAMINOPHEN 650 MG: 325 TABLET, FILM COATED ORAL at 08:35

## 2021-04-06 ASSESSMENT — ENCOUNTER SYMPTOMS
FALLS: 0
WEAKNESS: 0
FOCAL WEAKNESS: 0
DOUBLE VISION: 0
BLURRED VISION: 0
HEADACHES: 0
WHEEZING: 0
PALPITATIONS: 0
DIZZINESS: 1
LOSS OF CONSCIOUSNESS: 0
COUGH: 0
ORTHOPNEA: 0
SHORTNESS OF BREATH: 0
SORE THROAT: 1

## 2021-04-06 ASSESSMENT — FIBROSIS 4 INDEX
FIB4 SCORE: 0.44
FIB4 SCORE: 0.56

## 2021-04-06 NOTE — ED PROVIDER NOTES
"ED Provider Note    CHIEF COMPLAINT  Chief Complaint   Patient presents with   • Chest Pain       HPI  Kristin Balderrama is a 30 y.o. female who presents to the emergency room with chest pain.  Started at 6 this morning while getting some blood drawn.  Pain has been constant since then.  Pain is characterized as a sharp pressure.  Central sternum and left chest.  Worse with movement.  No pleuritic symptoms.  No cough, fever, leg swelling, hemoptysis.  Denies abdominal pain, nausea vomiting.      Chart reviewed: Patient has a history ofsinus tachycardia with ablation 2016 with post ablation PAF on chronic corlonar therapy. Sleep apnea on CPAP, diabetes, schizophrenia/borderline personality disorder and morbid obesity.  She is followed in the cardiology clinic by Dr. Ch.  Most recently seen at the middle of last month.  She had a PET myocardial perfusion scan in 2017 without inducible ischemia.    REVIEW OF SYSTEMS  As per HPI, otherwise a 10 point review of systems is negative    PAST MEDICAL HISTORY  Past Medical History:   Diagnosis Date   • Abdominal pain    • Anginal syndrome     random chest pain especially with tachycardia   • Apnea, sleep    • Arrhythmia     \"sinus tachycardia\", cariologist, Dr. Kumar; ablation 2/2016   • Arthritis     osteo   • ASTHMA     when around smoke   • Atrial fibrillation (HCC)    • Back pain    • Borderline personality disorder (HCC)    • Breath shortness     with tachycardia   • Bronchitis    • Cardiac arrhythmia    • Chickenpox    • Chronic UTI 9/18/2010   • COPD (chronic obstructive pulmonary disease) (HCC)    • Cough    • Daytime sleepiness    • Dental disorder 03/08/2021    infected tooth   • Depression    • Diabetes (HCC)    • Diarrhea     incontinece   • Disorder of thyroid    • Fall    • Fatigue    • Frequent headaches    • Gasping for breath    • Gynecological disorder     PCOS   • Headache(784.0)    • Heart burn    • Heart murmur    • History of falling    • " "Indigestion    • Migraine    • Mitochondrial disease (HCC)    • 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HCC)    • Nausea    • Obesity    • Other fatigue 6/29/2020   • Pain 08-15-12    back, 7/10   • Painful joint    • PCOS (polycystic ovarian syndrome)    • Pneumonia 2012 12/2020   • Psychosis (HCC)    • Ringing in ears    • Scoliosis    • Shortness of breath     O2 as needed   • Sinus tachycardia 10/31/2013   • Sleep apnea     CPAP \"pulmonary doctor took me off mid year 2016\"   • Snoring    • Tonsillitis    • 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HCC)    • Tuberculosis     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   • Urinary bladder disorder     Suprapubic cath   • Urinary incontinence    • Weakness    • Wears glasses        SOCIAL HISTORY  Social History     Tobacco Use   • Smoking status: Never Smoker   • Smokeless tobacco: Never Used   Substance Use Topics   • Alcohol use: No     Alcohol/week: 0.0 oz   • Drug use: Not Currently     Frequency: 7.0 times per week     Types: Marijuana       SURGICAL HISTORY  Past Surgical History:   Procedure Laterality Date   • BOWEL STIMULATOR INSERTION  3/10/2021    Procedure: INSERTION, ELECTRODE LEADS AND PULSE GENERATOR, NEUROSTIMULATOR, SACRAL - REMOVAL OF INTERSTIM WITH REPLACEMENT OF SACRAL NEUROMODULATION DEVICE;  Surgeon: Joe Noyola M.D.;  Location: [facility];  Service: General   • MUSCLE BIOPSY Right 1/26/2017    Procedure: MUSCLE BIOPSY - THIGH;  Surgeon: Isidro Vigil M.D.;  Location: [facility];  Service:    • GASTROSCOPY WITH BALLOON DILATATION N/A 1/4/2017    Procedure: GASTROSCOPY WITH DILATATION;  Surgeon: Torres Vargas M.D.;  Location: [facility];  Service:    • BOWEL STIMULATOR INSERTION  7/13/2016    Procedure: BOWEL STIMULATOR INSERTION FOR PERMANENT INTERSTIM SACRAL IMPLANT;  Surgeon: Joe Noyola M.D.;  Location: [facility];  Service:    • RECOVERY  1/27/2016    Procedure: CATH LAB EP STUDY WITH SINUS NODE MODIFICATION ABHINAV; " " Surgeon: Recoveryonly Surgery;  [place] [facility] [place]  Service:    • OTHER CARDIAC SURGERY  1/2016    cardiac ablation   • NEURO DEST FACET L/S W/IG SNGL  4/21/2015    Performed by Reza Tabor at [facility]ERY [facility]   • LUMBAR FUSION ANTERIOR  8/21/2012    Performed by SUSIE SAWANT at SURGERY [facility] ORS   • ALYSSA BY LAPAROSCOPY  8/29/2010    Performed by SHAYY JOHNS at SURGERY [facility] ORS   • LAMINOTOMY     • OTHER ABDOMINAL SURGERY     • TONSILLECTOMY      tonsillectomy       CURRENT MEDICATIONS  Home Medications    **Home medications have not yet been reviewed for this encounter**         ALLERGIES  Allergies   Allergen Reactions   • Depakote [Divalproex Sodium] Unspecified     Muscle spasms/muscle pain and weakness     • Amitriptyline Unspecified     Headaches     • Aripiprazole [Abilify] Unspecified     Headaches/muscle twitching     • Clindamycin Nausea     Even with food     • Flagyl [Metronidazole Hcl] Unspecified     \"eye problems\"     • Flomax [Tamsulosin Hydrochloride] Swelling   • Levaquin Unspecified     Severe muscle cramps in legs  RXN=unknown   • Metformin Unspecified     Increased lactic acid      • Tamsulosin Swelling     Swelling of legs   • Tape Rash     Tears skin off  coban with Tegaderm tape ok intermittently  RXN=ongoing   • Vancomycin Itching     Pt becomes flushed in face and chest.   RXN=7/10/16   • Wound Dressing Adhesive Hives     By pt report   • Ampicillin Rash     Pt reports that she received a rash    • Ciprofloxacin Rash         • Keflex Rash     Pt states she gets a rash with this medication  Tolerates ceftriaxone  Can take with Benadryl   • Levofloxacin Unspecified     Leg muscle cramps   • Metronidazole Rash     \"Vision problems\"   • Penicillins Hives     Can take with Benadryl   • Sulfa Drugs Itching and Myalgia     Muscle pain and weakness   • Valproic Acid Rash     .       PHYSICAL EXAM  VITAL SIGNS: /65   Pulse 62   Temp " "36.3 °C (97.4 °F) (Temporal)   Resp 12   Ht 1.651 m (5' 5\")   Wt 109 kg (240 lb)   SpO2 99%   BMI 39.94 kg/m²    Constitutional: Awake and alert  HENT: Normal inspection  Eyes: Normal inspection  Neck: Grossly normal range of motion.  Cardiovascular: Normal heart rate, Normal rhythm.  Symmetric peripheral pulses.   Thorax & Lungs: No respiratory distress, No wheezing, No rales, No rhonchi, lower central sternal chest wall tenderness  Abdomen: Bowel sounds normal, soft, non-distended, nontender, no mass  Skin: No obvious rash.  Back: No tenderness, No CVA tenderness.   Extremities: No clubbing, cyanosis, edema, no Homans or cords.  Neurologic: Grossly normal   Psychiatric: Normal for situation    RADIOLOGY/PROCEDURES  DX-CHEST-PORTABLE (1 VIEW)   Final Result      Hypoinflation without other evidence for acute cardiopulmonary disease.           Imaging is interpreted by radiologist    Labs:  Results for orders placed or performed during the hospital encounter of 04/06/21   CBC with Differential   Result Value Ref Range    WBC 6.8 4.8 - 10.8 K/uL    RBC 4.47 4.20 - 5.40 M/uL    Hemoglobin 13.3 12.0 - 16.0 g/dL    Hematocrit 41.6 37.0 - 47.0 %    MCV 93.1 81.4 - 97.8 fL    MCH 29.8 27.0 - 33.0 pg    MCHC 32.0 (L) 33.6 - 35.0 g/dL    RDW 50.0 35.9 - 50.0 fL    Platelet Count 156 (L) 164 - 446 K/uL    MPV 12.6 9.0 - 12.9 fL    Neutrophils-Polys 65.20 44.00 - 72.00 %    Lymphocytes 24.50 22.00 - 41.00 %    Monocytes 8.70 0.00 - 13.40 %    Eosinophils 0.60 0.00 - 6.90 %    Basophils 0.60 0.00 - 1.80 %    Immature Granulocytes 0.40 0.00 - 0.90 %    Nucleated RBC 0.00 /100 WBC    Neutrophils (Absolute) 4.41 2.00 - 7.15 K/uL    Lymphs (Absolute) 1.66 1.00 - 4.80 K/uL    Monos (Absolute) 0.59 0.00 - 0.85 K/uL    Eos (Absolute) 0.04 0.00 - 0.51 K/uL    Baso (Absolute) 0.04 0.00 - 0.12 K/uL    Immature Granulocytes (abs) 0.03 0.00 - 0.11 K/uL    NRBC (Absolute) 0.00 K/uL   Complete Metabolic Panel (CMP)   Result Value Ref " Range    Sodium 139 135 - 145 mmol/L    Potassium 3.6 3.6 - 5.5 mmol/L    Chloride 106 96 - 112 mmol/L    Co2 24 20 - 33 mmol/L    Anion Gap 9.0 7.0 - 16.0    Glucose 97 65 - 99 mg/dL    Bun 18 8 - 22 mg/dL    Creatinine 0.73 0.50 - 1.40 mg/dL    Calcium 9.3 8.5 - 10.5 mg/dL    AST(SGOT) 14 12 - 45 U/L    ALT(SGPT) 25 2 - 50 U/L    Alkaline Phosphatase 104 (H) 30 - 99 U/L    Total Bilirubin 0.2 0.1 - 1.5 mg/dL    Albumin 4.5 3.2 - 4.9 g/dL    Total Protein 6.5 6.0 - 8.2 g/dL    Globulin 2.0 1.9 - 3.5 g/dL    A-G Ratio 2.3 g/dL   Troponin   Result Value Ref Range    Troponin T <6 6 - 19 ng/L   ESTIMATED GFR   Result Value Ref Range    GFR If African American >60 >60 mL/min/1.73 m 2    GFR If Non African American >60 >60 mL/min/1.73 m 2   EKG   Result Value Ref Range    Report       [facility] Emergency Dept.    Test Date:  2021  Pt Name:    HARLEY DE LA CRUZ              Department: ER  MRN:        [id number]                      Room:       [facility]  Gender:     Female                       Technician: [id number]  :        [id number]                   Requested By:ER TRIAGE PROTOCOL  Order #:    [id number]                    Reading MD: CECILY OSCAR MD    Measurements  Intervals                                Axis  Rate:       65                           P:          12  OR:         144                          QRS:        12  QRSD:       94                           T:          6  QT:         468  QTc:        487    Interpretive Statements  SINUS RHYTHM  BORDERLINE T ABNORMALITIES, ANTERIOR LEADS  BORDERLINE PROLONGED QT INTERVAL  BASELINE WANDER IN LEAD(S) I,V1,V3,V4,V5,V6  Compared to ECG 2021 11:48:41  Ventricular premature complex(es) no longer present  T-wave abnormality still present  Electronically Signed On 2021 9:10:03 PDT  by CECILY OSCAR MD       *Note: Due to a large number of results and/or encounters for the requested time period, some results have not been displayed.  A complete set of results can be found in Results Review.          COURSE & MEDICAL DECISION MAKING  Patient presents with chest pain and palpitations.  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  EKG without changes here.  She has chest wall tenderness.  Vital signs are normal.  No hypoxia tachycardia or clinical suggestion of PE or dissection.  Chest x-ray was negative.  No pneumothorax or pneumonia.  Laboratory data was unremarkable.  Low risk heart score.  No indication for additional emergency department work-up.  I have advised her to follow-up with her cardiologist.  She will call and schedule appointment.  Return to the ER for worsening symptoms, not improving, difficulty breathing, high fevers or concern.    FINAL IMPRESSION  1.  Chest pain  2.  Palpitations      This dictation was created using voice recognition software. The accuracy of the dictation is limited to the abilities of the software.  The nursing notes were reviewed and certain aspects of this information were incorporated into this note.      Electronically signed by: Renzo Mancia M.D., 4/6/2021 7:59 AM

## 2021-04-06 NOTE — TELEPHONE ENCOUNTER
Per [place]: Pt. Has 4;15pm FV today. Call pt. To ask her to come in earlier.  Called pt.; she will come in at 2:45pm to see [place].

## 2021-04-06 NOTE — PROGRESS NOTES
"Chief Complaint   Patient presents with   • Supraventricular Tachycardia (SVT)     F/V Dx:SVT (supraventricular tachycardia) (HCC)       Subjective:   Kristin Balderrama is a 30 y.o. female who presents today for follow up bradycardia with heaviness in the chest.     She is followed by Dr. Leon in our clinic, history of sinus tachycardia with ablation 2016 with post ablation PAF on chronic corlonar therapy. Sleep apnea on CPAP, diabetes, schizophrenia/borderline personality disorder and morbid obesity.    Last seen 1/7/2021 with Dr. Leon. Recommended to start potassium and check mag/bmp.     Went to ER this morning with chest pressure. EKG and trop was negative. Referred back to see cardiology.     She continues to have chest discomfort this afternoon. Has been taking tyenolol with some relief.     Past Medical History:   Diagnosis Date   • Abdominal pain    • Anginal syndrome     random chest pain especially with tachycardia   • Apnea, sleep    • Arrhythmia     \"sinus tachycardia\", cariologist, Dr. Kumar; ablation 2/2016   • Arthritis     osteo   • ASTHMA     when around smoke   • Atrial fibrillation (HCC)    • Back pain    • Borderline personality disorder (HCC)    • Breath shortness     with tachycardia   • Bronchitis    • Cardiac arrhythmia    • Chickenpox    • Chronic UTI 9/18/2010   • COPD (chronic obstructive pulmonary disease) (HCC)    • Cough    • Daytime sleepiness    • Dental disorder 03/08/2021    infected tooth   • Depression    • Diabetes (HCC)    • Diarrhea     incontinece   • Disorder of thyroid    • Fall    • Fatigue    • Frequent headaches    • Gasping for breath    • Gynecological disorder     PCOS   • Headache(784.0)    • Heart burn    • Heart murmur    • History of falling    • Indigestion    • Migraine    • Mitochondrial disease (HCC)    • 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HCC)    • Nausea    • Obesity    • Other fatigue 6/29/2020   • Pain 08-15-12    back, 7/10   • Painful joint  " "  • PCOS (polycystic ovarian syndrome)    • Pneumonia 2012 12/2020   • Psychosis (HCC)    • Ringing in ears    • Scoliosis    • Shortness of breath     O2 as needed   • Sinus tachycardia 10/31/2013   • Sleep apnea     CPAP \"pulmonary doctor took me off mid year 2016\"   • Snoring    • Tonsillitis    • 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HCC)    • Tuberculosis     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   • Urinary bladder disorder     Suprapubic cath   • Urinary incontinence    • Weakness    • Wears glasses      Past Surgical History:   Procedure Laterality Date   • BOWEL STIMULATOR INSERTION  3/10/2021    Procedure: INSERTION, ELECTRODE LEADS AND PULSE GENERATOR, NEUROSTIMULATOR, SACRAL - REMOVAL OF INTERSTIM WITH REPLACEMENT OF SACRAL NEUROMODULATION DEVICE;  Surgeon: Joe Noyola M.D.;  Location: SURGERY [facility];  Service: General   • MUSCLE BIOPSY Right 1/26/2017    Procedure: MUSCLE BIOPSY - THIGH;  Surgeon: Isidro Vigil M.D.;  Location: [facility];  Service:    • GASTROSCOPY WITH BALLOON DILATATION N/A 1/4/2017    Procedure: GASTROSCOPY WITH DILATATION;  Surgeon: Torres Vargas M.D.;  Location: [facility];  Service:    • BOWEL STIMULATOR INSERTION  7/13/2016    Procedure: BOWEL STIMULATOR INSERTION FOR PERMANENT INTERSTIM SACRAL IMPLANT;  Surgeon: Joe Noyola M.D.;  Location: [facility];  Service:    • RECOVERY  1/27/2016    Procedure: CATH LAB EP STUDY WITH SINUS NODE MODIFICATION LA;  Surgeon: Recoveryonly Surgery;  [place] [facility]  Service:    • OTHER CARDIAC SURGERY  1/2016    cardiac ablation   • NEURO DEST FACET L/S W/IG SNGL  4/21/2015    Performed by Reza Tabor at [facility] ORS   • LUMBAR FUSION ANTERIOR  8/21/2012    Performed by SUSIE SAWANT at [facility] ORS   • ALYSSA BY LAPAROSCOPY  8/29/2010    Performed by SHAYY JOHNS at [facility] ORS   • LAMINOTOMY     • OTHER ABDOMINAL SURGERY     • " TONSILLECTOMY      tonsillectomy     Family History   Problem Relation Age of Onset   • Hypertension Mother    • Sleep Apnea Mother    • Heart Disease Mother    • Other Mother         hypothryod   • Hypertension Maternal Uncle    • Heart Disease Maternal Grandmother    • Hypertension Maternal Grandmother    • No Known Problems Sister    • Other Sister         Narcolepsy;fibromyalsia;bone;nerve   • Genitourinary () Problems Sister         endometriosis     Social History     Socioeconomic History   • Marital status: Single     Spouse name: Not on file   • Number of children: Not on file   • Years of education: Not on file   • Highest education level: Not on file   Occupational History   • Not on file   Tobacco Use   • Smoking status: Never Smoker   • Smokeless tobacco: Never Used   Substance and Sexual Activity   • Alcohol use: No     Alcohol/week: 0.0 oz   • Drug use: Not Currently     Frequency: 7.0 times per week     Types: Marijuana   • Sexual activity: Not Currently     Birth control/protection: Pill   Other Topics Concern   • Not on file   Social History Narrative    ** Merged History Encounter **          Social Determinants of Health     Financial Resource Strain: Low Risk    • Difficulty of Paying Living Expenses: Not hard at all   Food Insecurity: No Food Insecurity   • Worried About Running Out of Food in the Last Year: Never true   • Ran Out of Food in the Last Year: Never true   Transportation Needs: No Transportation Needs   • Lack of Transportation (Medical): No   • Lack of Transportation (Non-Medical): No   Physical Activity:    • Days of Exercise per Week:    • Minutes of Exercise per Session:    Stress:    • Feeling of Stress :    Social Connections:    • Frequency of Communication with Friends and Family:    • Frequency of Social Gatherings with Friends and Family:    • Attends Taoist Services:    • Active Member of Clubs or Organizations:    • Attends Club or Organization Meetings:    •  "Marital Status:    Intimate Partner Violence:    • Fear of Current or Ex-Partner:    • Emotionally Abused:    • Physically Abused:    • Sexually Abused:      Allergies   Allergen Reactions   • Depakote [Divalproex Sodium] Unspecified     Muscle spasms/muscle pain and weakness     • Amitriptyline Unspecified     Headaches     • Aripiprazole [Abilify] Unspecified     Headaches/muscle twitching     • Clindamycin Nausea     Even with food     • Flagyl [Metronidazole Hcl] Unspecified     \"eye problems\"     • Flomax [Tamsulosin Hydrochloride] Swelling   • Levaquin Unspecified     Severe muscle cramps in legs  RXN=unknown   • Metformin Unspecified     Increased lactic acid      • Tamsulosin Swelling     Swelling of legs   • Tape Rash     Tears skin off  coban with Tegaderm tape ok intermittently  RXN=ongoing   • Vancomycin Itching     Pt becomes flushed in face and chest.   RXN=7/10/16   • Wound Dressing Adhesive Hives     By pt report   • Ampicillin Rash     Pt reports that she received a rash    • Ciprofloxacin Rash         • Keflex Rash     Pt states she gets a rash with this medication  Tolerates ceftriaxone  Can take with Benadryl   • Levofloxacin Unspecified     Leg muscle cramps   • Metronidazole Rash     \"Vision problems\"   • Penicillins Hives     Can take with Benadryl   • Sulfa Drugs Itching and Myalgia     Muscle pain and weakness   • Valproic Acid Rash     .     Outpatient Encounter Medications as of 4/6/2021   Medication Sig Dispense Refill   • metoprolol SR (TOPROL XL) 25 MG TABLET SR 24 HR Take 0.5 Tablets by mouth every evening. 30 tablet 1   • baclofen (LIORESAL) 10 MG Tab Take 1 tablet by mouth 3 times a day. 90 tablet 2   • pregabalin (LYRICA) 300 MG capsule Take 1 capsule by mouth 2 times a day for 90 days. 60 capsule 2   • ivabradine (CORLANOR) 7.5 MG Tab tablet Take 1 tablet by mouth 2 times a day, with meals. 180 tablet 3   • busPIRone (BUSPAR) 30 MG tablet Take 1 tablet by mouth twice a day 180 " tablet 0   • ziprasidone (GEODON) 40 MG Cap Take 1 tablet by mouth twice a day 180 capsule 0   • OXcarbazepine (TRILEPTAL) 300 MG Tab Take 1 tablet by mouth 2 times a day. 180 tablet 0   • fluoxetine (PROZAC) 40 MG capsule Take 1 capsule by mouth every day. 90 capsule 0   • traZODone (DESYREL) 100 MG Tab Take 1 tablet by mouth every bedtime. 90 tablet 0   • predniSONE (DELTASONE) 10 MG Tab Take 10-70 mg by mouth every day. Started 2/27/2021. Take 7 tablets (70 mg) every day for 2 days, then 6 tablets (60 mg) every day for 2 days, then 5 tablets (50 mg) every day for 2 days, then 4 tablets (40 mg) every day for 2 days, then 3 tablets (30 mg) every day for 2 days, then 2 tablets (20 mg) every day for 2 days, then 1 tablet (10 mg) every day for 2 days.     • aspirin 81 MG EC tablet Take 81 mg by mouth every morning.     • penicillin v potassium (VEETID) 500 MG Tab Take 500 mg by mouth 4 times a day. 7 day course started 3/1/2021.     • diphenhydrAMINE (BENADRYL ALLERGY) 25 MG capsule Take 50 mg by mouth 4 times a day as needed (Antibiotic Allergy). 2 capsules = 50 mg. TAKEN WITH EACH ANTIBIOTIC DOSE.     • Dulaglutide (TRULICITY) 1.5 MG/0.5ML Solution Pen-injector Inject 0.5 mL under the skin every 7 days. 6 mL 3   • potassium chloride SA (KDUR) 20 MEQ Tab CR Take 40 mEq by mouth 2 times a day. 2 tablets = 40 mEq.     • mycophenolate (CELLCEPT) 500 MG tablet Take 2 Tablets by mouth 2 times a day. (Patient taking differently: Take 500 mg by mouth 2 times a day.) 120 tablet 0   • esomeprazole (NEXIUM) 40 MG delayed-release capsule Take 40 mg by mouth every morning.     • levothyroxine (SYNTHROID) 100 MCG Tab Take 100 mcg by mouth Every morning on an empty stomach.     • Melatonin 10 MG Tab Take 10 mg by mouth every bedtime.     • albuterol 108 (90 Base) MCG/ACT Aero Soln inhalation aerosol Inhale 2 Puffs every 6 hours as needed for Shortness of Breath. 8.5 g 6   • Mirabegron ER (MYRBETRIQ) 50 MG TABLET SR 24 HR Take 50  "mg by mouth every day. (Patient taking differently: Take 50 mg by mouth every morning.) 90 Tab 3   • acetaminophen (TYLENOL) 500 MG Tab Take 1,000 mg by mouth every 6 hours as needed for Moderate Pain. 2 tablets = 1000 mg.     • sodium bicarbonate 325 MG Tab Take 650 mg by mouth 2 times a day.     • topiramate (TOPAMAX) 50 MG tablet Take 50 mg by mouth 2 times a day.     • ondansetron (ZOFRAN ODT) 4 MG TABLET DISPERSIBLE Take 1 Tab by mouth every 6 hours as needed for Nausea. 10 Tab 0   • ipratropium-albuterol (DUONEB) 0.5-2.5 (3) MG/3ML nebulizer solution Take 3 mL by nebulization every four hours as needed for Shortness of Breath. Nebulizer      • calcium carbonate (TUMS EX) 750 MG chewable tablet Take 2 Tabs by mouth every day. 60 Tab 0   • vitamin D, Ergocalciferol, (DRISDOL) 1.25 MG (10204 UT) Cap capsule Take 50,000 Units by mouth every Friday. In the morning.     • oxybutynin (DITROPAN) 5 MG Tab Take 1 Tab by mouth 3 times a day. 90 Tab 0   • [DISCONTINUED] azithromycin (ZITHROMAX) 250 MG Tab Take two tabs by mouth on day one, then one tab by mouth daily on days 2-5. (Patient not taking: Reported on 4/5/2021) 6 tablet 0     No facility-administered encounter medications on file as of 4/6/2021.     Review of Systems   Constitutional: Positive for malaise/fatigue.   HENT: Positive for sore throat.    Eyes: Negative for blurred vision and double vision.   Respiratory: Negative for cough, shortness of breath and wheezing.    Cardiovascular: Positive for chest pain. Negative for palpitations, orthopnea and leg swelling.   Musculoskeletal: Negative for falls.   Neurological: Positive for dizziness. Negative for focal weakness, loss of consciousness, weakness and headaches.   All other systems reviewed and are negative.       Objective:   /70 (BP Location: Left arm, Patient Position: Sitting, BP Cuff Size: Adult)   Pulse 85   Resp 12   Ht 1.651 m (5' 5\")   Wt 111 kg (244 lb)   SpO2 98%   BMI 40.60 kg/m² "     Physical Exam   Constitutional: She is oriented to person, place, and time. She appears well-developed and well-nourished. No distress.   HENT:   Head: Normocephalic and atraumatic.   Eyes: Pupils are equal, round, and reactive to light.   Neck: No JVD present.   Cardiovascular: Normal rate, regular rhythm and normal heart sounds.   No murmur heard.  Pulmonary/Chest: Effort normal and breath sounds normal. No respiratory distress.   Abdominal: Soft. Bowel sounds are normal. She exhibits no distension.   Musculoskeletal:         General: No edema.   Neurological: She is alert and oriented to person, place, and time.   Skin: Skin is warm and dry. She is not diaphoretic.   Psychiatric: She has a normal mood and affect. Her behavior is normal. Judgment and thought content normal.          Monitor   8/26/2020  1. Sinus rhythm with appropriate heart rate range. Average 73, range 52 to 117 bpm.   2. Normal sinus node and AV node conduction normal. No pauses.   3. Rare PACs.   4. Rare PVCs.   5. No sustained rhythm changes. No atrial fibrillation/flutter.   6. 5 patient triggers, symptoms reported include fluttering and racing, shaky during sinus, 73 to 90 bpm..     Conclusion: Normal monitor.  Sinus rhythm with rare PVCs and PACs.  Symptoms during sinus rhythm.     Lexy Solomon MD [facility]     ECHO  3/6/2021  FINDINGS  Left Ventricle  Normal left ventricular size, wall thickness, and systolic function.   Left ventricular ejection fraction is visually estimated to be 60%.     Right Ventricle  Normal right ventricular size and systolic function.     Right Atrium  Normal right atrial size.     Left Atrium  Normal left atrial size.     Mitral Valve  Structurally normal mitral valve without significant stenosis or   regurgitation.     Aortic Valve  Structurally normal aortic valve without significant stenosis or   regurgitation.     Tricuspid Valve  Structurally normal tricuspid valve. No tricuspid stenosis. Mild   tricuspid  regurgitation. Right ventricular systolic pressure is   estimated to be 18 mmHg. Right atrial pressure is estimated to be 3   mmHg.     Pulmonic Valve  Structurally normal pulmonic valve without significant stenosis or   regurgitation.     Pericardium  Normal pericardium without effusion.     Aorta  Normal aortic root for body surface area.       Myocardial Perfusion  4/6/2017   No myocardial infarct or inducible ischemia.   Normal LEFT ventricular function.    Assessment:     1. Other chest pain  EKG - Clinic Performed    NM-CARDIAC STRESS TEST   2. SVT (supraventricular tachycardia) ([facility])         Medical Decision Making:  Today's Assessment / Status / Plan:     1. Chest pain:  - EKG today showed NSR no change to previous ekg   - continue asa therapy   - start low dose metoprolol XL 12.5mg q evening. Low dose patient is on corlanor   - MPI ordered   - ER precaution discussed     2. Sinus tachycardia s/p ablation:  - continue corlanor 7.5mg BID   - ECHO showed ef 60%    3. History of SVT

## 2021-04-06 NOTE — TELEPHONE ENCOUNTER
RT    Pt called stating she was in the ER this morning for chest pain and A-fib. Pt states she is home now, but having severe chest pain. Tried reaching nurse, but unavailable. Offered to transfer Pt to nurse triage line, but Pt declined. Please call Pt back [phone].    Thank you

## 2021-04-06 NOTE — ED TRIAGE NOTES
Chief Complaint   Patient presents with   • Chest Pain     Pt c/o chest pain that began this morning while getting her blood drawn.  States pain is 5/10, non radiating, nothing makes it better or worse.  EMS reports that when the fire dept arrived pt was in a-fib but converted to normal sinus prior to remsa's arrival.  Pt also c/o mild nausea.  No SOB.  No diaphoresis.  Connected to cardiac monitor, BP cuff, and continuous pulse ox upon arrival.  Pt in gown, call light in reach, bed in lowest position.  Chart up for ERP.

## 2021-04-06 NOTE — PROGRESS NOTES
[facility]  Progress Note  Established Patient    Subjective:   Kristin Balderrama is a 30 y.o. female here today with a chief complaint of hand pain. The patient is alone, both of us are masked.     Bilateral hand pain  Ms. Balderrama is a rather medically complicated patient of Dr. Preston's who comes in today complaining of 3 days of bilateral hand pain and occasional swelling with associated redness.  This sometimes is associated with bilateral foot pain, swelling and redness.  Patient states that she was seen in the [facility] emergency department yesterday where she was started on prednisone for this problem.  This does not seem to help.  Labs were taken in the ER which she brings in today.  Her ESR, CRP, white count are all normal.  It appears that a bilateral hand x-ray was also taken but I do not have access to these.       Current Outpatient Medications on File Prior to Visit   Medication Sig Dispense Refill   • baclofen (LIORESAL) 10 MG Tab Take 1 tablet by mouth 3 times a day. 90 tablet 2   • pregabalin (LYRICA) 300 MG capsule Take 1 capsule by mouth 2 times a day for 90 days. 60 capsule 2   • ivabradine (CORLANOR) 7.5 MG Tab tablet Take 1 tablet by mouth 2 times a day, with meals. 180 tablet 3   • busPIRone (BUSPAR) 30 MG tablet Take 1 tablet by mouth twice a day 180 tablet 0   • ziprasidone (GEODON) 40 MG Cap Take 1 tablet by mouth twice a day 180 capsule 0   • OXcarbazepine (TRILEPTAL) 300 MG Tab Take 1 tablet by mouth 2 times a day. 180 tablet 0   • fluoxetine (PROZAC) 40 MG capsule Take 1 capsule by mouth every day. 90 capsule 0   • traZODone (DESYREL) 100 MG Tab Take 1 tablet by mouth every bedtime. 90 tablet 0   • predniSONE (DELTASONE) 10 MG Tab Take 10-70 mg by mouth every day. Started 2/27/2021. Take 7 tablets (70 mg) every day for 2 days, then 6 tablets (60 mg) every day for 2 days, then 5 tablets (50 mg) every day for 2 days, then 4 tablets (40 mg) every day for 2 days, then 3  tablets (30 mg) every day for 2 days, then 2 tablets (20 mg) every day for 2 days, then 1 tablet (10 mg) every day for 2 days.     • aspirin 81 MG EC tablet Take 81 mg by mouth every morning.     • diphenhydrAMINE (BENADRYL ALLERGY) 25 MG capsule Take 50 mg by mouth 4 times a day as needed (Antibiotic Allergy). 2 capsules = 50 mg. TAKEN WITH EACH ANTIBIOTIC DOSE.     • Dulaglutide (TRULICITY) 1.5 MG/0.5ML Solution Pen-injector Inject 0.5 mL under the skin every 7 days. 6 mL 3   • potassium chloride SA (KDUR) 20 MEQ Tab CR Take 40 mEq by mouth 2 times a day. 2 tablets = 40 mEq.     • mycophenolate (CELLCEPT) 500 MG tablet Take 2 Tablets by mouth 2 times a day. (Patient taking differently: Take 500 mg by mouth 2 times a day.) 120 tablet 0   • esomeprazole (NEXIUM) 40 MG delayed-release capsule Take 40 mg by mouth every morning.     • levothyroxine (SYNTHROID) 100 MCG Tab Take 100 mcg by mouth Every morning on an empty stomach.     • Melatonin 10 MG Tab Take 10 mg by mouth every bedtime.     • albuterol 108 (90 Base) MCG/ACT Aero Soln inhalation aerosol Inhale 2 Puffs every 6 hours as needed for Shortness of Breath. 8.5 g 6   • Mirabegron ER (MYRBETRIQ) 50 MG TABLET SR 24 HR Take 50 mg by mouth every day. (Patient taking differently: Take 50 mg by mouth every morning.) 90 Tab 3   • acetaminophen (TYLENOL) 500 MG Tab Take 1,000 mg by mouth every 6 hours as needed for Moderate Pain. 2 tablets = 1000 mg.     • sodium bicarbonate 325 MG Tab Take 650 mg by mouth 2 times a day.     • topiramate (TOPAMAX) 50 MG tablet Take 50 mg by mouth 2 times a day.     • ondansetron (ZOFRAN ODT) 4 MG TABLET DISPERSIBLE Take 1 Tab by mouth every 6 hours as needed for Nausea. 10 Tab 0   • ipratropium-albuterol (DUONEB) 0.5-2.5 (3) MG/3ML nebulizer solution Take 3 mL by nebulization every four hours as needed for Shortness of Breath. Nebulizer      • calcium carbonate (TUMS EX) 750 MG chewable tablet Take 2 Tabs by mouth every day. 60 Tab 0  "  • vitamin D, Ergocalciferol, (DRISDOL) 1.25 MG (06527 UT) Cap capsule Take 50,000 Units by mouth every Friday. In the morning.     • oxybutynin (DITROPAN) 5 MG Tab Take 1 Tab by mouth 3 times a day. 90 Tab 0   • azithromycin (ZITHROMAX) 250 MG Tab Take two tabs by mouth on day one, then one tab by mouth daily on days 2-5. (Patient not taking: Reported on 4/5/2021) 6 tablet 0   • penicillin v potassium (VEETID) 500 MG Tab Take 500 mg by mouth 4 times a day. 7 day course started 3/1/2021.       No current facility-administered medications on file prior to visit.          Objective:     Vitals:    04/05/21 1629   BP: 120/64   BP Location: Left arm   Patient Position: Sitting   BP Cuff Size: Adult long   Pulse: 79   Resp: 20   Temp: 36.9 °C (98.5 °F)   TempSrc: Temporal   SpO2: 95%   Weight: 109 kg (240 lb 9.6 oz)   Height: 1.651 m (5' 5\")       Physical Exam:  General: alert in no apparent distress.   Ext: 2+ radial pulses bilaterally, 2+ DP pulses bilaterally.  Sensation intact and equal on monofilament testing of the bilateral fingers and bilateral toes.  Hands and feet are warm and well perfused with no redness.  It is difficult to assess whether or not they are swollen considering habitus and my lack of familiarity with this patient.  Full range of motion of hands and fingers but the patient does hold the bilateral fingers in a contracted position.         Assessment and Plan:     1. Bilateral hand pain  I am reassured by the patient's recent white count, ESR and CRP.  This does not appear to be an infectious etiology.  Differential diagnosis includes osteoarthritis, rheumatoid arthritis and gout.  I am a little surprised that the prednisone has not helped her yet but I suspect that by tomorrow or the next day she will be feeling quite a bit better.  In the interim, we will request bilateral hand x-ray from [facility] and also obtain a CCP, rheumatoid factor and uric acid on her.  I recommended a low purine " diet to her.  - URIC ACID; Future  - RHEUMATOID ARTHRITIS FACTOR; Future  - CCP ANTIBODY; Future        Followup: Return if symptoms worsen or fail to improve.

## 2021-04-07 LAB
EKG IMPRESSION: NORMAL
RHEUMATOID FACT SER IA-ACNC: <10 IU/ML (ref 0–14)

## 2021-04-08 ENCOUNTER — HOSPITAL ENCOUNTER (EMERGENCY)
Facility: MEDICAL CENTER | Age: 31
End: 2021-04-08
Attending: EMERGENCY MEDICINE
Payer: MEDICARE

## 2021-04-08 ENCOUNTER — NURSE TRIAGE (OUTPATIENT)
Dept: HEALTH INFORMATION MANAGEMENT | Facility: OTHER | Age: 31
End: 2021-04-08

## 2021-04-08 ENCOUNTER — APPOINTMENT (OUTPATIENT)
Dept: RADIOLOGY | Facility: MEDICAL CENTER | Age: 31
End: 2021-04-08
Attending: NURSE PRACTITIONER
Payer: MEDICARE

## 2021-04-08 VITALS
OXYGEN SATURATION: 98 % | DIASTOLIC BLOOD PRESSURE: 78 MMHG | BODY MASS INDEX: 40.92 KG/M2 | TEMPERATURE: 97.8 F | RESPIRATION RATE: 16 BRPM | WEIGHT: 245.59 LBS | HEIGHT: 65 IN | HEART RATE: 86 BPM | SYSTOLIC BLOOD PRESSURE: 134 MMHG

## 2021-04-08 LAB — EKG IMPRESSION: NORMAL

## 2021-04-08 PROCEDURE — 93005 ELECTROCARDIOGRAM TRACING: CPT | Performed by: EMERGENCY MEDICINE

## 2021-04-08 PROCEDURE — 302449 STATCHG TRIAGE ONLY (STATISTIC)

## 2021-04-08 PROCEDURE — 93005 ELECTROCARDIOGRAM TRACING: CPT

## 2021-04-08 ASSESSMENT — LIFESTYLE VARIABLES: DO YOU DRINK ALCOHOL: NO

## 2021-04-08 ASSESSMENT — FIBROSIS 4 INDEX: FIB4 SCORE: 0.56

## 2021-04-08 NOTE — PROGRESS NOTES
Attempted Patient outreach.  LVM with contact information.  
Attempted patient outreach for CCM.  LVM with contact information.    
Have attempted to contact Kristin with no answer or return calls.  Discharging from [facility] services at this time.  
No

## 2021-04-08 NOTE — TELEPHONE ENCOUNTER
"Pt c/o Chest feels tight, oxygen level at 87%-95% RA.  Hx.COPD, Asthma, Angina.    Pt seen in ED for CP on 4/4/21. Told, she was fine.    Advised to returned to ED, pt stated that she not allowed to go back to the ED, per grandmother. Advised to go to , pt stated that same thing, that she is not allowed.    Ask pt if she feels safe, she stated, \"yes\".      Asked pt if she want me to call an ambulance for her, she stated, \"no\".  Continue to to advise pt to be seen by a provider, and reminded her that her PCP/office does not have any appt for today/tomorrow.    Advised to go to ED.     Pt called back around 1415 to request that I call the ambulance.  C/O increase tightness in her chest.  Pt remains AOx4.  Called [facility] for pt, and transferred care to [facility].          Reason for Disposition  • MILD difficulty breathing (e.g., minimal/no SOB at rest, SOB with walking, pulse < 100) of new onset or worse than normal    Additional Information  • Negative: Breathing stopped and hasn't returned  • Negative: Choking on something  • Negative: SEVERE difficulty breathing (e.g., struggling for each breath, speaks in single words, pulse > 120)  • Negative: Bluish (or gray) lips or face  • Negative: Difficult to awaken or acting confused (e.g., disoriented, slurred speech)  • Negative: Passed out (i.e., fainted, collapsed and was not responding)  • Negative: Wheezing started suddenly after medicine, an allergic food, or bee sting  • Negative: Stridor  • Negative: Slow, shallow and weak breathing  • Negative: Sounds like a life-threatening emergency to the triager  • Negative: Chest pain  • Negative: Wheezing (high pitched whistling sound) and previous asthma attacks or use of asthma medicines  • Negative: Difficulty breathing and only present when coughing  • Negative: Difficulty breathing and only from stuffy or runny nose  • Negative: MODERATE difficulty breathing (e.g., speaks in phrases, SOB even at rest, pulse 100-120) of new " "onset or worse than normal  • Negative: Wheezing can be heard across the room  • Negative: Drooling or spitting out saliva (because can't swallow)  • Negative: Any history of prior \"blood clot\" in leg or lungs  • Negative: Recent illness requiring prolonged bedrest (i.e., immobilization)  • Negative: Hip or leg fracture in past 2 months (e.g., or had cast on leg or ankle)  • Negative: Major surgery in the past month  • Negative: Recent long-distance travel with prolonged time in car, bus, plane, or train (i.e., within past 2 weeks; 6 or  more hours duration)  • Negative: Extra heart beats OR irregular heart beating   (i.e., \"palpitations\")  • Negative: Fever > 103 F (39.4 C)  • Negative: Fever > 101 F (38.3 C) and over 60 years of age  • Negative: Fever > 100.0 F (37.8 C) and bedridden (e.g., nursing home patient, stroke, chronic illness, recovering from surgery)  • Negative: Fever > 100.0 F (37.8 C) and diabetes mellitus or weak immune system (e.g., HIV positive, cancer chemo, splenectomy, organ transplant, chronic steroids)  • Negative: Periods where breathing stops and then resumes normally and bedridden (e.g., nursing home patient, CVA)  • Negative: Pregnant or postpartum (from 0 to 6 weeks after delivery)  • Negative: Patient sounds very sick or weak to the triager    Answer Assessment - Initial Assessment Questions  1. RESPIRATORY STATUS: \"Describe your breathing?\" (e.g., wheezing, shortness of breath, unable to speak, severe coughing)       Shortness of breath  2. ONSET: \"When did this breathing problem begin?\"       This morning  3. PATTERN \"Does the difficult breathing come and go, or has it been constant since it started?\"       constant  4. SEVERITY: \"How bad is your breathing?\" (e.g., mild, moderate, severe)     - MILD: No SOB at rest, mild SOB with walking, speaks normally in sentences, can lay down, no retractions, pulse < 100.     - MODERATE: SOB at rest, SOB with minimal exertion and prefers to sit, " "cannot lie down flat, speaks in phrases, mild retractions, audible wheezing, pulse 100-120.     - SEVERE: Very SOB at rest, speaks in single words, struggling to breathe, sitting hunched forward, retractions, pulse > 120       HR 70 Mild  5. RECURRENT SYMPTOM: \"Have you had difficulty breathing before?\" If so, ask: \"When was the last time?\" and \"What happened that time?\"       No  6. CARDIAC HISTORY: \"Do you have any history of heart disease?\" (e.g., heart attack, angina, bypass surgery, angioplasty)       Angina  7. LUNG HISTORY: \"Do you have any history of lung disease?\"  (e.g., pulmonary embolus, asthma, emphysema)      COPD, asthma  8. CAUSE: \"What do you think is causing the breathing problem?\"       Blockage in lungs  9. OTHER SYMPTOMS: \"Do you have any other symptoms? (e.g., dizziness, runny nose, cough, chest pain, fever)      Chest pain, temp 99.5  10. PREGNANCY: \"Is there any chance you are pregnant?\" \"When was your last menstrual period?\"        no  11. TRAVEL: \"Have you traveled out of the country in the last month?\" (e.g., travel history, exposures)        no    Protocols used: BREATHING DIFFICULTY-A-OH      "

## 2021-04-08 NOTE — TELEPHONE ENCOUNTER
Regarding: Congested, lungs feel tight, oxygen  ----- Message from Lety Calles sent at 4/8/2021 12:57 PM PDT -----  Patient is congested, lungs feel tight and states she has a hard time maintaining oxygen. Symptoms began today.

## 2021-04-08 NOTE — ED TRIAGE NOTES
"Chief Complaint   Patient presents with   • Shortness of Breath   • Chest Pain   • Fever   • Diarrhea   • Cough     EKG prior to triage.  Sx started today at 6AM.     /67   Pulse 86   Temp 36.6 °C (97.8 °F) (Temporal)   Resp 14   Ht 1.651 m (5' 5\")   Wt 111 kg (245 lb 9.5 oz)   SpO2 95%   BMI 40.87 kg/m²   Pt placed back in lobby, educated on triage process, and told to inform staff of any change in condition.       "

## 2021-04-09 ENCOUNTER — DOCUMENTATION (OUTPATIENT)
Dept: HEALTH INFORMATION MANAGEMENT | Facility: OTHER | Age: 31
End: 2021-04-09

## 2021-04-09 LAB — CCP IGG SERPL-ACNC: 2 UNITS (ref 0–19)

## 2021-04-09 NOTE — ED NOTES
Patient came to nursing station stating she wanted to leave without seeing the ERP. Educated patient on risks of leaving without being seen,verbalized understanding. Patient ambulated independently to ER lobby

## 2021-04-12 ENCOUNTER — APPOINTMENT (OUTPATIENT)
Dept: URGENT CARE | Facility: CLINIC | Age: 31
End: 2021-04-12
Payer: MEDICARE

## 2021-04-12 ENCOUNTER — OFFICE VISIT (OUTPATIENT)
Dept: URGENT CARE | Facility: CLINIC | Age: 31
End: 2021-04-12
Payer: MEDICARE

## 2021-04-12 VITALS
RESPIRATION RATE: 18 BRPM | SYSTOLIC BLOOD PRESSURE: 110 MMHG | BODY MASS INDEX: 40.65 KG/M2 | DIASTOLIC BLOOD PRESSURE: 97 MMHG | OXYGEN SATURATION: 97 % | HEIGHT: 65 IN | TEMPERATURE: 97.4 F | HEART RATE: 87 BPM | WEIGHT: 244 LBS

## 2021-04-12 DIAGNOSIS — R05.9 COUGH: ICD-10-CM

## 2021-04-12 DIAGNOSIS — R06.02 SOB (SHORTNESS OF BREATH): ICD-10-CM

## 2021-04-12 DIAGNOSIS — J22 LRTI (LOWER RESPIRATORY TRACT INFECTION): ICD-10-CM

## 2021-04-12 PROCEDURE — 99213 OFFICE O/P EST LOW 20 MIN: CPT | Performed by: NURSE PRACTITIONER

## 2021-04-12 RX ORDER — FLUTICASONE PROPIONATE 220 UG/1
1 AEROSOL, METERED RESPIRATORY (INHALATION) 2 TIMES DAILY
Qty: 1 EACH | Refills: 0 | Status: ON HOLD
Start: 2021-04-12 | End: 2021-04-28

## 2021-04-12 RX ORDER — BENZONATATE 100 MG/1
100 CAPSULE ORAL 3 TIMES DAILY PRN
Qty: 60 CAPSULE | Refills: 0 | Status: ON HOLD
Start: 2021-04-12 | End: 2021-04-28

## 2021-04-12 RX ORDER — DOXYCYCLINE HYCLATE 100 MG
100 TABLET ORAL 2 TIMES DAILY
Qty: 14 TABLET | Refills: 0 | Status: ON HOLD
Start: 2021-04-12 | End: 2021-04-19

## 2021-04-12 ASSESSMENT — ENCOUNTER SYMPTOMS
COUGH: 1
DIARRHEA: 0
SHORTNESS OF BREATH: 1
HEADACHES: 0
WHEEZING: 0
VOMITING: 0
CHILLS: 1
SPUTUM PRODUCTION: 1
ABDOMINAL PAIN: 0
NAUSEA: 0
DIZZINESS: 0
FEVER: 1
MYALGIAS: 0

## 2021-04-12 ASSESSMENT — FIBROSIS 4 INDEX: FIB4 SCORE: 0.56

## 2021-04-12 NOTE — PROGRESS NOTES
Subjective:   Kristin Balderrama is a 30 y.o. female who presents for Cough (sob, tight in chest, hurts to breath deep, low grade fever x2days)      HPI  30-year-old female patient in urgent care for new onset cough, shortness of breath, tightness in chest, inspiratory pain and low-grade fever T-max 100.0.  Patient is taken over-the-counter Tylenol and used an albuterol inhaler that she has had for previous shortness of breath.  Tried to be seen in the emergency department on 4-8-21 but they were so busy that she left without being seen.  Patient states that her symptoms are persisting.  Does have a very productive cough although she was unsure whether or not the sputum is purulent.  She is unsure what she can take over-the-counter due to all of her allergies as well has her medication list.    Review of Systems   Constitutional: Positive for chills, fever and malaise/fatigue.   Respiratory: Positive for cough, sputum production and shortness of breath. Negative for wheezing.    Gastrointestinal: Negative for abdominal pain, diarrhea, nausea and vomiting.   Musculoskeletal: Negative for myalgias.   Neurological: Negative for dizziness and headaches.       Patient Active Problem List   Diagnosis   • Borderline personality disorder in adult ([facility])   • Dissociative identity disorder ([facility])   • Anxiety   • Fatty liver disease, nonalcoholic   • Peripheral neuropathy and chornic pain syndrome ([facility])   • Morbidly obese ([facility])   • Scoliosis   • GERD (gastroesophageal reflux disease)   • Vitamin D deficiency   • Polypharmacy   • Full incontinence of feces   • Schizophrenia ([facility])   • Depression   • Diabetes mellitus type 2 in obese ([facility])   • Hypothyroidism   • Functional diarrhea   • SVT (supraventricular tachycardia) ([facility])   • Optic neuritis   • Immunocompromised ([facility])   • Mild intermittent asthma without complication   • Mixed stress and urge urinary incontinence   • Intracranial hypertension   • Schizoaffective disorder,  depressive type (HCC)   • PTSD (post-traumatic stress disorder)   • 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HCC)   • Dry eyes   • Seizure (HCC)   • Urinary tract infection associated with indwelling urethral catheter (HCC)   • Suprapubic catheter dysfunction (HCC)   • Bilateral hand pain     Past Surgical History:   Procedure Laterality Date   • BOWEL STIMULATOR INSERTION  3/10/2021    Procedure: INSERTION, ELECTRODE LEADS AND PULSE GENERATOR, NEUROSTIMULATOR, SACRAL - REMOVAL OF INTERSTIM WITH REPLACEMENT OF SACRAL NEUROMODULATION DEVICE;  Surgeon: Joe Noyola M.D.;  Location[facility] SURGERY [facility];  Service: General   • MUSCLE BIOPSY Right 1/26/2017    Procedure: MUSCLE BIOPSY - THIGH;  Surgeon: Isidro Vigil M.D.;  Location: SURGERY [facility];  Service:    • GASTROSCOPY WITH BALLOON DILATATION N/A 1/4/2017    Procedure: GASTROSCOPY WITH DILATATION;  Surgeon: Torres Vargas M.D.;  Location: [facility];  Service:    • BOWEL STIMULATOR INSERTION  7/13/2016    Procedure: BOWEL STIMULATOR INSERTION FOR PERMANENT INTERSTIM SACRAL IMPLANT;  Surgeon: Joe Noyola M.D.;  Location: SURGERY [facility];  Service:    • RECOVERY  1/27/2016    Procedure: CATH LAB EP STUDY WITH SINUS NODE MODIFICATION ABHINAV;  Surgeon: Recoveryonly Surgery;  Location: SURGERY PRE-POST PROC UNIT [facility];  Service:    • OTHER CARDIAC SURGERY  1/2016    cardiac ablation   • NEURO DEST FACET L/S W/IG SNGL  4/21/2015    Performed by Reza Tabor at [facility]   • LUMBAR FUSION ANTERIOR  8/21/2012    Performed by SUSIE SAWANT at [facility] ORS   • ALYSSA BY LAPAROSCOPY  8/29/2010    Performed by SHAYY JOHNS at [facility] ORS   • LAMINOTOMY     • OTHER ABDOMINAL SURGERY     • TONSILLECTOMY      tonsillectomy     Social History     Tobacco Use   • Smoking status: Never Smoker   • Smokeless tobacco: Never Used   Substance Use Topics   • Alcohol use: No     Alcohol/week: 0.0 oz   • Drug use: Not Currently      "Frequency: 7.0 times per week     Types: Marijuana      Family History   Problem Relation Age of Onset   • Hypertension Mother    • Sleep Apnea Mother    • Heart Disease Mother    • Other Mother         hypothryod   • Hypertension Maternal Uncle    • Heart Disease Maternal Grandmother    • Hypertension Maternal Grandmother    • No Known Problems Sister    • Other Sister         Narcolepsy;fibromyalsia;bone;nerve   • Genitourinary () Problems Sister         endometriosis      (Allergies, Medications, & Tobacco/Substance Use were reconciled by the Medical Assistant and reviewed by myself. The family history is prepopulated)     Objective:     /97 (BP Location: Left arm, Patient Position: Sitting, BP Cuff Size: Large adult)   Pulse 87   Temp 36.3 °C (97.4 °F) (Temporal)   Resp 18   Ht 1.651 m (5' 5\")   Wt 111 kg (244 lb)   SpO2 97%   BMI 40.60 kg/m²     Physical Exam  Vitals reviewed.   Constitutional:       Appearance: Normal appearance.   HENT:      Right Ear: Tympanic membrane, ear canal and external ear normal.      Left Ear: Tympanic membrane, ear canal and external ear normal.      Nose: Nose normal.      Mouth/Throat:      Mouth: Mucous membranes are moist.   Cardiovascular:      Rate and Rhythm: Normal rate and regular rhythm.      Heart sounds: Normal heart sounds.   Pulmonary:      Effort: Pulmonary effort is normal.      Breath sounds: Normal breath sounds.   Skin:     General: Skin is warm.   Neurological:      Mental Status: She is alert and oriented to person, place, and time.   Psychiatric:         Mood and Affect: Mood normal.         Behavior: Behavior normal.         Thought Content: Thought content normal.         Judgment: Judgment normal.         Assessment/Plan:     1. LRTI (lower respiratory tract infection)  doxycycline (VIBRAMYCIN) 100 MG Tab   2. SOB (shortness of breath)  fluticasone (FLOVENT HFA) 220 MCG/ACT Aerosol   3. Cough  benzonatate (TESSALON) 100 MG Cap     Discussed " physical examination findings as well as patient presentation, length patient symptoms is consistent with a lower respiratory tract infection.  Will treat with antibiotics and advised to finish all antibiotics as prescribed.  Also provide patient with a steroid inhaler and advised to use twice a day using her albuterol as needed.  Advised patient to rinse mouth out with water after use of steroid inhaler to prevent thrush.  Will provide patient with Tessalon Perles.  Discussed over-the-counter medication she can use including Mucinex as well as Tylenol.  Advised patient increase fluids using electrolyte enriched beverages.    Differential diagnosis, natural history, supportive care, and indications for immediate follow-up discussed.    Advised the patient to follow-up with the primary care physician for recheck, reevaluation, and consideration of further management.    Please note that this dictation was created using voice recognition software. I have made a reasonable attempt to correct obvious errors, but I expect that there are errors of grammar and possibly content that I did not discover before finalizing the note.    This note was electronically signed EDD Reynolds

## 2021-04-13 ENCOUNTER — HOSPITAL ENCOUNTER (OUTPATIENT)
Facility: MEDICAL CENTER | Age: 31
DRG: 202 | End: 2021-04-13
Attending: NURSE PRACTITIONER
Payer: MEDICARE

## 2021-04-13 ENCOUNTER — APPOINTMENT (OUTPATIENT)
Dept: RADIOLOGY | Facility: MEDICAL CENTER | Age: 31
DRG: 202 | End: 2021-04-13
Attending: EMERGENCY MEDICINE
Payer: MEDICARE

## 2021-04-13 ENCOUNTER — HOSPITAL ENCOUNTER (EMERGENCY)
Facility: MEDICAL CENTER | Age: 31
DRG: 202 | End: 2021-04-13
Attending: EMERGENCY MEDICINE
Payer: MEDICARE

## 2021-04-13 VITALS
SYSTOLIC BLOOD PRESSURE: 110 MMHG | WEIGHT: 244 LBS | OXYGEN SATURATION: 95 % | BODY MASS INDEX: 40.65 KG/M2 | TEMPERATURE: 97.7 F | RESPIRATION RATE: 17 BRPM | DIASTOLIC BLOOD PRESSURE: 55 MMHG | HEART RATE: 84 BPM | HEIGHT: 65 IN

## 2021-04-13 DIAGNOSIS — J20.9 ACUTE BRONCHITIS, UNSPECIFIED ORGANISM: ICD-10-CM

## 2021-04-13 LAB
ALBUMIN SERPL BCP-MCNC: 3.8 G/DL (ref 3.2–4.9)
ALBUMIN/GLOB SERPL: 1.9 G/DL
ALP SERPL-CCNC: 87 U/L (ref 30–99)
ALT SERPL-CCNC: 18 U/L (ref 2–50)
ANION GAP SERPL CALC-SCNC: 12 MMOL/L (ref 7–16)
AST SERPL-CCNC: 12 U/L (ref 12–45)
BASOPHILS # BLD AUTO: 0.4 % (ref 0–1.8)
BASOPHILS # BLD: 0.02 K/UL (ref 0–0.12)
BILIRUB SERPL-MCNC: 0.3 MG/DL (ref 0.1–1.5)
BUN SERPL-MCNC: 10 MG/DL (ref 8–22)
CALCIUM SERPL-MCNC: 8.8 MG/DL (ref 8.5–10.5)
CHLORIDE SERPL-SCNC: 107 MMOL/L (ref 96–112)
CO2 SERPL-SCNC: 20 MMOL/L (ref 20–33)
CREAT SERPL-MCNC: 0.62 MG/DL (ref 0.5–1.4)
EOSINOPHIL # BLD AUTO: 0.1 K/UL (ref 0–0.51)
EOSINOPHIL NFR BLD: 2.2 % (ref 0–6.9)
ERYTHROCYTE [DISTWIDTH] IN BLOOD BY AUTOMATED COUNT: 45.5 FL (ref 35.9–50)
GLOBULIN SER CALC-MCNC: 2 G/DL (ref 1.9–3.5)
GLUCOSE SERPL-MCNC: 123 MG/DL (ref 65–99)
HCT VFR BLD AUTO: 35.5 % (ref 37–47)
HGB BLD-MCNC: 11.7 G/DL (ref 12–16)
IMM GRANULOCYTES # BLD AUTO: 0.03 K/UL (ref 0–0.11)
IMM GRANULOCYTES NFR BLD AUTO: 0.7 % (ref 0–0.9)
LACTATE BLD-SCNC: 1.7 MMOL/L (ref 0.5–2)
LYMPHOCYTES # BLD AUTO: 0.87 K/UL (ref 1–4.8)
LYMPHOCYTES NFR BLD: 19.3 % (ref 22–41)
MCH RBC QN AUTO: 29.7 PG (ref 27–33)
MCHC RBC AUTO-ENTMCNC: 33 G/DL (ref 33.6–35)
MCV RBC AUTO: 90.1 FL (ref 81.4–97.8)
MONOCYTES # BLD AUTO: 0.3 K/UL (ref 0–0.85)
MONOCYTES NFR BLD AUTO: 6.7 % (ref 0–13.4)
NEUTROPHILS # BLD AUTO: 3.19 K/UL (ref 2–7.15)
NEUTROPHILS NFR BLD: 70.7 % (ref 44–72)
NRBC # BLD AUTO: 0 K/UL
NRBC BLD-RTO: 0 /100 WBC
PLATELET # BLD AUTO: 152 K/UL (ref 164–446)
PMV BLD AUTO: 12.3 FL (ref 9–12.9)
POTASSIUM SERPL-SCNC: 3.6 MMOL/L (ref 3.6–5.5)
PROT SERPL-MCNC: 5.8 G/DL (ref 6–8.2)
RBC # BLD AUTO: 3.94 M/UL (ref 4.2–5.4)
SODIUM SERPL-SCNC: 139 MMOL/L (ref 135–145)
WBC # BLD AUTO: 4.5 K/UL (ref 4.8–10.8)

## 2021-04-13 PROCEDURE — U0005 INFEC AGEN DETEC AMPLI PROBE: HCPCS

## 2021-04-13 PROCEDURE — 71045 X-RAY EXAM CHEST 1 VIEW: CPT

## 2021-04-13 PROCEDURE — 83605 ASSAY OF LACTIC ACID: CPT

## 2021-04-13 PROCEDURE — 80053 COMPREHEN METABOLIC PANEL: CPT

## 2021-04-13 PROCEDURE — U0003 INFECTIOUS AGENT DETECTION BY NUCLEIC ACID (DNA OR RNA); SEVERE ACUTE RESPIRATORY SYNDROME CORONAVIRUS 2 (SARS-COV-2) (CORONAVIRUS DISEASE [COVID-19]), AMPLIFIED PROBE TECHNIQUE, MAKING USE OF HIGH THROUGHPUT TECHNOLOGIES AS DESCRIBED BY CMS-2020-01-R: HCPCS

## 2021-04-13 PROCEDURE — 36415 COLL VENOUS BLD VENIPUNCTURE: CPT

## 2021-04-13 PROCEDURE — 700111 HCHG RX REV CODE 636 W/ 250 OVERRIDE (IP): Performed by: EMERGENCY MEDICINE

## 2021-04-13 PROCEDURE — 99284 EMERGENCY DEPT VISIT MOD MDM: CPT

## 2021-04-13 PROCEDURE — 87040 BLOOD CULTURE FOR BACTERIA: CPT

## 2021-04-13 PROCEDURE — 85025 COMPLETE CBC W/AUTO DIFF WBC: CPT

## 2021-04-13 RX ORDER — PREDNISONE 20 MG/1
60 TABLET ORAL DAILY
Qty: 12 TABLET | Refills: 0 | Status: ON HOLD | OUTPATIENT
Start: 2021-04-13 | End: 2021-04-19

## 2021-04-13 RX ORDER — PREDNISONE 20 MG/1
60 TABLET ORAL DAILY
Status: DISCONTINUED | OUTPATIENT
Start: 2021-04-13 | End: 2021-04-13 | Stop reason: HOSPADM

## 2021-04-13 RX ADMIN — PREDNISONE 60 MG: 20 TABLET ORAL at 13:51

## 2021-04-13 ASSESSMENT — FIBROSIS 4 INDEX: FIB4 SCORE: 0.56

## 2021-04-13 NOTE — ED PROVIDER NOTES
ED Provider Note    CHIEF COMPLAINT  Chief Complaint   Patient presents with   • Shortness of Breath   • Cough     productive cough, green phlegm       HPI  Kristin Balderrama is a 30 y.o. female who presents stating that she has had cough for 3 days, she went to urgent care yesterday and was started on antibiotics and and given and inhalers.  The patient says today she felt worse and a mobile medical unit did a negative Covid test on her.  She comes in for evaluation.  The patient describes productive cough with green sputum.  She has been taking Zithromax since yesterday only.    REVIEW OF SYSTEMS  See HPI for further details. All other systems are negative.     PAST MEDICAL HISTORY   has a past medical history of Abdominal pain, Anginal syndrome, Apnea, sleep, Arrhythmia, Arthritis, ASTHMA, Atrial fibrillation ([facility]), Back pain, Borderline personality disorder ([facility]), Breath shortness, Bronchitis, Cardiac arrhythmia, Chickenpox, Chronic UTI (9/18/2010), COPD (chronic obstructive pulmonary disease) ([facility]), Cough, Daytime sleepiness, Dental disorder (03/08/2021), Depression, Diabetes ([facility]), Diarrhea, Disorder of thyroid, Fall, Fatigue, Frequent headaches, Gasping for breath, Gynecological disorder, Headache(784.0), Heart burn, Heart murmur, History of falling, Indigestion, Migraine, Mitochondrial disease ([facility]), 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    SOCIAL HISTORY  Social History     Tobacco Use   • Smoking status: Never Smoker   • Smokeless tobacco: Never Used   Substance and Sexual Activity   • Alcohol use: No     Alcohol/week: 0.0  oz   • Drug use: Not Currently     Frequency: 7.0 times per week     Types: Marijuana   • Sexual activity: Not Currently     Birth control/protection: Pill       SURGICAL HISTORY   has a past surgical history that includes neuro dest facet l/s w/ig sngl (4/21/2015); recovery (1/27/2016); [place] by laparoscopy (8/29/2010); lumbar fusion anterior (8/21/2012); other cardiac surgery (1/2016); tonsillectomy; bowel stimulator insertion (7/13/2016); gastroscopy with balloon dilatation (N/A, 1/4/2017); muscle biopsy (Right, 1/26/2017); other abdominal surgery; laminotomy; and bowel stimulator insertion (3/10/2021).    CURRENT MEDICATIONS  Home Medications     Reviewed by Katarina Barrientos R.N. (Registered Nurse) on 04/13/21 at 1242  Med List Status: Partial   Medication Last Dose Status   acetaminophen (TYLENOL) 500 MG Tab 4/13/2021 Active   albuterol 108 (90 Base) MCG/ACT Aero Soln inhalation aerosol 4/13/2021 Active   aspirin 81 MG EC tablet  Active   baclofen (LIORESAL) 10 MG Tab 4/12/2021 Active   benzonatate (TESSALON) 100 MG Cap 4/13/2021 Active   busPIRone (BUSPAR) 30 MG tablet 4/13/2021 Active   calcium carbonate (TUMS EX) 750 MG chewable tablet  Active   diphenhydrAMINE (BENADRYL ALLERGY) 25 MG capsule  Active   doxycycline (VIBRAMYCIN) 100 MG Tab 4/13/2021 Active   Dulaglutide (TRULICITY) 1.5 MG/0.5ML Solution Pen-injector  Active   esomeprazole (NEXIUM) 40 MG delayed-release capsule  Active   fluoxetine (PROZAC) 40 MG capsule 4/12/2021 Active   fluticasone (FLOVENT HFA) 220 MCG/ACT Aerosol 4/13/2021 Active   ipratropium-albuterol (DUONEB) 0.5-2.5 (3) MG/3ML nebulizer solution 4/13/2021 Active   ivabradine (CORLANOR) 7.5 MG Tab tablet  Active   levothyroxine (SYNTHROID) 100 MCG Tab 4/12/2021 Active   Melatonin 10 MG Tab  Active   metoprolol SR (TOPROL XL) 25 MG TABLET SR 24 HR 4/12/2021 Active   Mirabegron ER (MYRBETRIQ) 50 MG TABLET SR 24 HR  Active   mycophenolate (CELLCEPT) 500 MG tablet  Active   ondansetron  "(ZOFRAN ODT) 4 MG TABLET DISPERSIBLE  Active   OXcarbazepine (TRILEPTAL) 300 MG Tab  Active   oxybutynin (DITROPAN) 5 MG Tab 4/12/2021 Active   potassium chloride SA (KDUR) 20 MEQ Tab CR 4/12/2021 Active   predniSONE (DELTASONE) 10 MG Tab  Active   pregabalin (LYRICA) 300 MG capsule 4/12/2021 Active   sodium bicarbonate 325 MG Tab  Active   topiramate (TOPAMAX) 50 MG tablet 4/12/2021 Active   traZODone (DESYREL) 100 MG Tab  Active   vitamin D, Ergocalciferol, (DRISDOL) 1.25 MG (81383 UT) Cap capsule  Active   ziprasidone (GEODON) 40 MG Cap 4/12/2021 Active                ALLERGIES  Allergies   Allergen Reactions   • Depakote [Divalproex Sodium] Unspecified     Muscle spasms/muscle pain and weakness     • Amitriptyline Unspecified     Headaches     • Aripiprazole [Abilify] Unspecified     Headaches/muscle twitching     • Clindamycin Nausea     Even with food     • Flagyl [Metronidazole Hcl] Unspecified     \"eye problems\"     • Flomax [Tamsulosin Hydrochloride] Swelling   • Levaquin Unspecified     Severe muscle cramps in legs  RXN=unknown   • Metformin Unspecified     Increased lactic acid      • Tamsulosin Swelling     Swelling of legs   • Tape Rash     Tears skin off  coban with Tegaderm tape ok intermittently  RXN=ongoing   • Vancomycin Itching     Pt becomes flushed in face and chest.   RXN=7/10/16   • Wound Dressing Adhesive Hives     By pt report   • Ampicillin Rash     Pt reports that she received a rash    • Ciprofloxacin Rash         • Keflex Rash     Pt states she gets a rash with this medication  Tolerates ceftriaxone  Can take with Benadryl   • Levofloxacin Unspecified     Leg muscle cramps   • Metronidazole Rash     \"Vision problems\"   • Penicillins Hives     Can take with Benadryl   • Sulfa Drugs Itching and Myalgia     Muscle pain and weakness   • Valproic Acid Rash     .       PHYSICAL EXAM  VITAL SIGNS: /56   Pulse 90   Temp 37.7 °C (99.9 °F) (Oral)   Resp 18   Ht 1.651 m (5' 5\")   Wt 111 " kg (244 lb)   SpO2 96%   BMI 40.60 kg/m²  @JODI[[id number]::@   Pulse ox interpretation: I interpret this pulse ox as normal.  Constitutional: Alert in no apparent distress.  HENT: No signs of trauma, Bilateral external ears normal, Nose normal.   Eyes: Pupils are equal and reactive, Conjunctiva normal, Non-icteric.   Neck: Normal range of motion, No tenderness, Supple, No stridor.   Lymphatic: No lymphadenopathy noted.   Cardiovascular: Regular rate and rhythm, no murmurs.   Thorax & Lungs: Normal breath sounds, No respiratory distress, No wheezing, No chest tenderness.   Abdomen: Bowel sounds normal, Soft, No tenderness, No masses, No pulsatile masses. No peritoneal signs.  Skin: Warm, Dry, No erythema, No rash.   Back: No bony tenderness, No CVA tenderness.   Extremities: Intact distal pulses, No edema, No tenderness, No cyanosis.  Musculoskeletal: Good range of motion in all major joints. No tenderness to palpation or major deformities noted.   Neurologic: Alert , Normal motor function, Normal sensory function, No focal deficits noted.   Psychiatric: Affect normal, Judgment normal, Mood normal.       DIAGNOSTIC STUDIES / PROCEDURES        LABS  Labs Reviewed   CBC WITH DIFFERENTIAL - Abnormal; Notable for the following components:       Result Value    WBC 4.5 (*)     RBC 3.94 (*)     Hemoglobin 11.7 (*)     Hematocrit 35.5 (*)     MCHC 33.0 (*)     Platelet Count 152 (*)     Lymphocytes 19.30 (*)     Lymphs (Absolute) 0.87 (*)     All other components within normal limits   COMP METABOLIC PANEL - Abnormal; Notable for the following components:    Glucose 123 (*)     Total Protein 5.8 (*)     All other components within normal limits   LACTIC ACID   ESTIMATED GFR   LACTIC ACID   LACTIC ACID   URINALYSIS   URINE CULTURE(NEW)   BLOOD CULTURE   BLOOD CULTURE         RADIOLOGY  DX-CHEST-PORTABLE (1 VIEW)   Final Result      No acute cardiac or pulmonary abnormalities are identified.              COURSE & MEDICAL  DECISION MAKING  Pertinent Labs & Imaging studies reviewed. (See chart for details)    Differential diagnosis: Pneumonia, bronchitis, asthma exacerbation.    The patient has no evidence of hypoxia.  Here she was given prednisone 60 mg p.o.  She felt better after nebulizer was given to her in route.  Currently she has no wheezing.  I will prescribe her 4 more days of prednisone, she will follow-up with her doctor, she will return if worse, she will continue albuterol nebs and continue her Z-Ilya, she is only had 1 day of the antibiotic.  The patient will return for new or worsening symptoms and is stable at the time of discharge.    The patient is referred to a primary physician for blood pressure management, diabetic screening, and for all other preventative health concerns.        DISPOSITION:  Patient will be discharged home in stable condition.    FOLLOW UP:  [facility], Emergency Dept  [address] [id number] [phone]    If symptoms worsen    Sue Preston M.D.  [address]  [facility] [address]  [facility] [id number] [phone]      As needed      OUTPATIENT MEDICATIONS:  New Prescriptions    PREDNISONE (DELTASONE) 20 MG TAB    Take 3 Tablets by mouth every day for 4 days.       The patient will return for worsening symptoms and is stable at the time of discharge. The patient verbalizes understanding and will comply.    FINAL IMPRESSION  1. Acute bronchitis, unspecified organism                Electronically signed by: Francois Santiago M.D., 4/13/2021 2:45 PM

## 2021-04-13 NOTE — DISCHARGE INSTRUCTIONS
Bronchitis  Bronchitis is the body's way of reacting to injury and/or infection (inflammation) of the bronchi. Bronchi are the air tubes that extend from the windpipe into the lungs. If the inflammation becomes severe, it may cause shortness of breath.  CAUSES   Inflammation may be caused by:  · A virus.  · Germs (bacteria).  · Dust.  · Allergens.  · Pollutants and many other irritants.  The cells lining the bronchial tree are covered with tiny hairs (cilia). These constantly beat upward, away from the lungs, toward the mouth. This keeps the lungs free of pollutants. When these cells become too irritated and are unable to do their job, mucus begins to develop. This causes the characteristic cough of bronchitis. The cough clears the lungs when the cilia are unable to do their job. Without either of these protective mechanisms, the mucus would settle in the lungs. Then you would develop pneumonia.  Smoking is a common cause of bronchitis and can contribute to pneumonia. Stopping this habit is the single most important thing you can do to help yourself.  TREATMENT   · Your caregiver may prescribe an antibiotic if the cough is caused by bacteria. Also, medicines that open up your airways make it easier to breathe. Your caregiver may also recommend or prescribe an expectorant. It will loosen the mucus to be coughed up. Only take over-the-counter or prescription medicines for pain, discomfort, or fever as directed by your caregiver.  · Removing whatever causes the problem (smoking, for example) is critical to preventing the problem from getting worse.  · Cough suppressants may be prescribed for relief of cough symptoms.  · Inhaled medicines may be prescribed to help with symptoms now and to help prevent problems from returning.  · For those with recurrent (chronic) bronchitis, there may be a need for steroid medicines.  SEEK IMMEDIATE MEDICAL CARE IF:   · During treatment, you develop more pus-like mucus (purulent  sputum).  · You have a fever.  · Your baby is older than 3 months with a rectal temperature of 102° F (38.9° C) or higher.  · Your baby is 3 months old or younger with a rectal temperature of 100.4° F (38° C) or higher.  · You become progressively more ill.  · You have increased difficulty breathing, wheezing, or shortness of breath.  It is necessary to seek immediate medical care if you are elderly or sick from any other disease.  MAKE SURE YOU:   · Understand these instructions.  · Will watch your condition.  · Will get help right away if you are not doing well or get worse.  Document Released: 12/18/2006 Document Revised: 03/11/2013 Document Reviewed: 10/27/2009  [employer]® Patient Information ©2014 [employer], [employer].

## 2021-04-13 NOTE — ED TRIAGE NOTES
.  Chief Complaint   Patient presents with   • Shortness of Breath   • Cough     productive cough, green phlegm      Pt BIB by EMS for above complaints. Pt reports being seen at Urgent Care yesterday for same complaints, diagnosed with bronchitis, placed on abx. Pt notes worsening SOB, fever of 100F at home.   [facility] dispatch saw Pt at home this morning, rapid COVID test neg, gave Pt albuterol tx. Pt states she continues to feel worse so called EMS. Pt given Neb treatment PTA, Pt reports feeling a little better with treatment.

## 2021-04-15 ENCOUNTER — APPOINTMENT (OUTPATIENT)
Dept: RADIOLOGY | Facility: MEDICAL CENTER | Age: 31
DRG: 202 | End: 2021-04-15
Attending: INTERNAL MEDICINE
Payer: MEDICARE

## 2021-04-15 ENCOUNTER — APPOINTMENT (OUTPATIENT)
Dept: RADIOLOGY | Facility: MEDICAL CENTER | Age: 31
DRG: 202 | End: 2021-04-15
Attending: STUDENT IN AN ORGANIZED HEALTH CARE EDUCATION/TRAINING PROGRAM
Payer: MEDICARE

## 2021-04-15 ENCOUNTER — APPOINTMENT (OUTPATIENT)
Dept: RADIOLOGY | Facility: MEDICAL CENTER | Age: 31
DRG: 202 | End: 2021-04-15
Attending: EMERGENCY MEDICINE
Payer: MEDICARE

## 2021-04-15 ENCOUNTER — HOSPITAL ENCOUNTER (INPATIENT)
Facility: MEDICAL CENTER | Age: 31
LOS: 3 days | DRG: 202 | End: 2021-04-19
Attending: EMERGENCY MEDICINE | Admitting: INTERNAL MEDICINE
Payer: MEDICARE

## 2021-04-15 DIAGNOSIS — J45.21 INTERMITTENT ASTHMA WITH ACUTE EXACERBATION, UNSPECIFIED ASTHMA SEVERITY: ICD-10-CM

## 2021-04-15 DIAGNOSIS — R06.00 DYSPNEA, UNSPECIFIED TYPE: ICD-10-CM

## 2021-04-15 DIAGNOSIS — R06.2 WHEEZING: ICD-10-CM

## 2021-04-15 PROBLEM — K62.5 BLOOD PER RECTUM: Status: ACTIVE | Noted: 2021-04-15

## 2021-04-15 PROBLEM — I48.91 ATRIAL FIBRILLATION (HCC): Status: ACTIVE | Noted: 2021-04-15

## 2021-04-15 PROBLEM — J45.901 ASTHMA EXACERBATION: Status: ACTIVE | Noted: 2021-04-15

## 2021-04-15 LAB
ALBUMIN SERPL BCP-MCNC: 4.2 G/DL (ref 3.2–4.9)
ALBUMIN/GLOB SERPL: 2.2 G/DL
ALP SERPL-CCNC: 90 U/L (ref 30–99)
ALT SERPL-CCNC: 18 U/L (ref 2–50)
ANION GAP SERPL CALC-SCNC: 10 MMOL/L (ref 7–16)
ANION GAP SERPL CALC-SCNC: 13 MMOL/L (ref 7–16)
ANION GAP SERPL CALC-SCNC: 14 MMOL/L (ref 7–16)
ANION GAP SERPL CALC-SCNC: 15 MMOL/L (ref 7–16)
APPEARANCE UR: CLEAR
AST SERPL-CCNC: 11 U/L (ref 12–45)
B-OH-BUTYR SERPL-MCNC: 0.07 MMOL/L (ref 0.02–0.27)
BACTERIA GENITAL QL WET PREP: NORMAL
BASE EXCESS BLDA CALC-SCNC: -5 MMOL/L (ref -4–3)
BASE EXCESS BLDA CALC-SCNC: -8 MMOL/L (ref -4–3)
BASOPHILS # BLD AUTO: 0.2 % (ref 0–1.8)
BASOPHILS # BLD AUTO: 0.4 % (ref 0–1.8)
BASOPHILS # BLD: 0.01 K/UL (ref 0–0.12)
BASOPHILS # BLD: 0.02 K/UL (ref 0–0.12)
BILIRUB SERPL-MCNC: <0.2 MG/DL (ref 0.1–1.5)
BILIRUB UR QL STRIP.AUTO: NEGATIVE
BODY TEMPERATURE: ABNORMAL CENTIGRADE
BODY TEMPERATURE: ABNORMAL CENTIGRADE
BUN SERPL-MCNC: 10 MG/DL (ref 8–22)
BUN SERPL-MCNC: 7 MG/DL (ref 8–22)
BUN SERPL-MCNC: 8 MG/DL (ref 8–22)
BUN SERPL-MCNC: 8 MG/DL (ref 8–22)
CALCIUM SERPL-MCNC: 8.8 MG/DL (ref 8.5–10.5)
CALCIUM SERPL-MCNC: 8.8 MG/DL (ref 8.5–10.5)
CALCIUM SERPL-MCNC: 9 MG/DL (ref 8.5–10.5)
CALCIUM SERPL-MCNC: 9.1 MG/DL (ref 8.5–10.5)
CHLORIDE SERPL-SCNC: 104 MMOL/L (ref 96–112)
CHLORIDE SERPL-SCNC: 106 MMOL/L (ref 96–112)
CHLORIDE SERPL-SCNC: 107 MMOL/L (ref 96–112)
CHLORIDE SERPL-SCNC: 107 MMOL/L (ref 96–112)
CO2 SERPL-SCNC: 18 MMOL/L (ref 20–33)
CO2 SERPL-SCNC: 19 MMOL/L (ref 20–33)
CO2 SERPL-SCNC: 19 MMOL/L (ref 20–33)
CO2 SERPL-SCNC: 22 MMOL/L (ref 20–33)
COLOR UR: YELLOW
CREAT SERPL-MCNC: 0.55 MG/DL (ref 0.5–1.4)
CREAT SERPL-MCNC: 0.56 MG/DL (ref 0.5–1.4)
CREAT SERPL-MCNC: 0.61 MG/DL (ref 0.5–1.4)
CREAT SERPL-MCNC: 0.64 MG/DL (ref 0.5–1.4)
D DIMER PPP IA.FEU-MCNC: <0.27 UG/ML (FEU) (ref 0–0.5)
EKG IMPRESSION: NORMAL
EOSINOPHIL # BLD AUTO: 0 K/UL (ref 0–0.51)
EOSINOPHIL # BLD AUTO: 0.02 K/UL (ref 0–0.51)
EOSINOPHIL NFR BLD: 0 % (ref 0–6.9)
EOSINOPHIL NFR BLD: 0.4 % (ref 0–6.9)
ERYTHROCYTE [DISTWIDTH] IN BLOOD BY AUTOMATED COUNT: 48.8 FL (ref 35.9–50)
ERYTHROCYTE [DISTWIDTH] IN BLOOD BY AUTOMATED COUNT: 49.1 FL (ref 35.9–50)
FLUAV RNA SPEC QL NAA+PROBE: NEGATIVE
FLUBV RNA SPEC QL NAA+PROBE: NEGATIVE
GLOBULIN SER CALC-MCNC: 1.9 G/DL (ref 1.9–3.5)
GLUCOSE BLD-MCNC: 154 MG/DL (ref 65–99)
GLUCOSE BLD-MCNC: 193 MG/DL (ref 65–99)
GLUCOSE SERPL-MCNC: 166 MG/DL (ref 65–99)
GLUCOSE SERPL-MCNC: 198 MG/DL (ref 65–99)
GLUCOSE SERPL-MCNC: 215 MG/DL (ref 65–99)
GLUCOSE SERPL-MCNC: 261 MG/DL (ref 65–99)
GLUCOSE UR STRIP.AUTO-MCNC: 500 MG/DL
HCG SERPL QL: NEGATIVE
HCO3 BLDA-SCNC: 18 MMOL/L (ref 17–25)
HCO3 BLDA-SCNC: 19 MMOL/L (ref 17–25)
HCT VFR BLD AUTO: 36.4 % (ref 37–47)
HCT VFR BLD AUTO: 37.8 % (ref 37–47)
HGB BLD-MCNC: 11.5 G/DL (ref 12–16)
HGB BLD-MCNC: 11.7 G/DL (ref 12–16)
IMM GRANULOCYTES # BLD AUTO: 0.08 K/UL (ref 0–0.11)
IMM GRANULOCYTES # BLD AUTO: 0.08 K/UL (ref 0–0.11)
IMM GRANULOCYTES NFR BLD AUTO: 1.4 % (ref 0–0.9)
IMM GRANULOCYTES NFR BLD AUTO: 1.6 % (ref 0–0.9)
INR PPP: 1 (ref 0.87–1.13)
KETONES UR STRIP.AUTO-MCNC: NEGATIVE MG/DL
LACTATE BLD-SCNC: 3.5 MMOL/L (ref 0.5–2)
LACTATE BLD-SCNC: 4.5 MMOL/L (ref 0.5–2)
LACTATE BLD-SCNC: 5 MMOL/L (ref 0.5–2)
LACTATE BLD-SCNC: 5.8 MMOL/L (ref 0.5–2)
LACTATE BLD-SCNC: 6.6 MMOL/L (ref 0.5–2)
LEUKOCYTE ESTERASE UR QL STRIP.AUTO: NEGATIVE
LYMPHOCYTES # BLD AUTO: 0.41 K/UL (ref 1–4.8)
LYMPHOCYTES # BLD AUTO: 1.26 K/UL (ref 1–4.8)
LYMPHOCYTES NFR BLD: 25 % (ref 22–41)
LYMPHOCYTES NFR BLD: 7.3 % (ref 22–41)
MAGNESIUM SERPL-MCNC: 1.7 MG/DL (ref 1.5–2.5)
MAGNESIUM SERPL-MCNC: 1.8 MG/DL (ref 1.5–2.5)
MCH RBC QN AUTO: 28.9 PG (ref 27–33)
MCH RBC QN AUTO: 29.6 PG (ref 27–33)
MCHC RBC AUTO-ENTMCNC: 31 G/DL (ref 33.6–35)
MCHC RBC AUTO-ENTMCNC: 31.6 G/DL (ref 33.6–35)
MCV RBC AUTO: 93.3 FL (ref 81.4–97.8)
MCV RBC AUTO: 93.6 FL (ref 81.4–97.8)
MICRO URNS: ABNORMAL
MONOCYTES # BLD AUTO: 0.09 K/UL (ref 0–0.85)
MONOCYTES # BLD AUTO: 0.44 K/UL (ref 0–0.85)
MONOCYTES NFR BLD AUTO: 1.6 % (ref 0–13.4)
MONOCYTES NFR BLD AUTO: 8.7 % (ref 0–13.4)
NEUTROPHILS # BLD AUTO: 3.22 K/UL (ref 2–7.15)
NEUTROPHILS # BLD AUTO: 5.06 K/UL (ref 2–7.15)
NEUTROPHILS NFR BLD: 63.9 % (ref 44–72)
NEUTROPHILS NFR BLD: 89.5 % (ref 44–72)
NITRITE UR QL STRIP.AUTO: NEGATIVE
NRBC # BLD AUTO: 0 K/UL
NRBC # BLD AUTO: 0.02 K/UL
NRBC BLD-RTO: 0 /100 WBC
NRBC BLD-RTO: 0.4 /100 WBC
PCO2 BLDA: 33.6 MMHG (ref 26–37)
PCO2 BLDA: 34.9 MMHG (ref 26–37)
PH BLDA: 7.32 [PH] (ref 7.4–7.5)
PH BLDA: 7.38 [PH] (ref 7.4–7.5)
PH UR STRIP.AUTO: 5.5 [PH] (ref 5–8)
PLATELET # BLD AUTO: 172 K/UL (ref 164–446)
PLATELET # BLD AUTO: 182 K/UL (ref 164–446)
PMV BLD AUTO: 12.1 FL (ref 9–12.9)
PMV BLD AUTO: 12.2 FL (ref 9–12.9)
PO2 BLDA: 104.3 MMHG (ref 64–87)
PO2 BLDA: 79.1 MMHG (ref 64–87)
POTASSIUM SERPL-SCNC: 3.3 MMOL/L (ref 3.6–5.5)
POTASSIUM SERPL-SCNC: 3.8 MMOL/L (ref 3.6–5.5)
POTASSIUM SERPL-SCNC: 4.7 MMOL/L (ref 3.6–5.5)
POTASSIUM SERPL-SCNC: 4.8 MMOL/L (ref 3.6–5.5)
PROCALCITONIN SERPL-MCNC: <0.05 NG/ML
PROT SERPL-MCNC: 6.1 G/DL (ref 6–8.2)
PROT UR QL STRIP: NEGATIVE MG/DL
PROTHROMBIN TIME: 13.4 SEC (ref 12–14.6)
RBC # BLD AUTO: 3.89 M/UL (ref 4.2–5.4)
RBC # BLD AUTO: 4.05 M/UL (ref 4.2–5.4)
RBC UR QL AUTO: NEGATIVE
RSV RNA SPEC QL NAA+PROBE: NEGATIVE
SAO2 % BLDA: 95.1 % (ref 93–99)
SAO2 % BLDA: 97 % (ref 93–99)
SARS-COV-2 RNA RESP QL NAA+PROBE: NOTDETECTED
SIGNIFICANT IND 70042: NORMAL
SITE SITE: NORMAL
SODIUM SERPL-SCNC: 136 MMOL/L (ref 135–145)
SODIUM SERPL-SCNC: 138 MMOL/L (ref 135–145)
SODIUM SERPL-SCNC: 139 MMOL/L (ref 135–145)
SODIUM SERPL-SCNC: 141 MMOL/L (ref 135–145)
SOURCE SOURCE: NORMAL
SP GR UR STRIP.AUTO: 1.04
SPECIMEN SOURCE: NORMAL
TROPONIN T SERPL-MCNC: 7 NG/L (ref 6–19)
TROPONIN T SERPL-MCNC: <6 NG/L (ref 6–19)
UROBILINOGEN UR STRIP.AUTO-MCNC: 0.2 MG/DL
WBC # BLD AUTO: 5 K/UL (ref 4.8–10.8)
WBC # BLD AUTO: 5.7 K/UL (ref 4.8–10.8)

## 2021-04-15 PROCEDURE — 96372 THER/PROPH/DIAG INJ SC/IM: CPT

## 2021-04-15 PROCEDURE — 82803 BLOOD GASES ANY COMBINATION: CPT | Mod: 91

## 2021-04-15 PROCEDURE — C9803 HOPD COVID-19 SPEC COLLECT: HCPCS | Performed by: EMERGENCY MEDICINE

## 2021-04-15 PROCEDURE — 83605 ASSAY OF LACTIC ACID: CPT | Mod: 91

## 2021-04-15 PROCEDURE — 94640 AIRWAY INHALATION TREATMENT: CPT

## 2021-04-15 PROCEDURE — 81003 URINALYSIS AUTO W/O SCOPE: CPT

## 2021-04-15 PROCEDURE — 96375 TX/PRO/DX INJ NEW DRUG ADDON: CPT

## 2021-04-15 PROCEDURE — 93010 ELECTROCARDIOGRAM REPORT: CPT | Mod: GZ | Performed by: INTERNAL MEDICINE

## 2021-04-15 PROCEDURE — 99291 CRITICAL CARE FIRST HOUR: CPT | Performed by: INTERNAL MEDICINE

## 2021-04-15 PROCEDURE — 96366 THER/PROPH/DIAG IV INF ADDON: CPT

## 2021-04-15 PROCEDURE — 700105 HCHG RX REV CODE 258: Performed by: INTERNAL MEDICINE

## 2021-04-15 PROCEDURE — 82010 KETONE BODYS QUAN: CPT

## 2021-04-15 PROCEDURE — 51798 US URINE CAPACITY MEASURE: CPT

## 2021-04-15 PROCEDURE — 84484 ASSAY OF TROPONIN QUANT: CPT

## 2021-04-15 PROCEDURE — A9270 NON-COVERED ITEM OR SERVICE: HCPCS | Performed by: INTERNAL MEDICINE

## 2021-04-15 PROCEDURE — 99220 PR INITIAL OBSERVATION CARE,LEVL III: CPT | Mod: GC | Performed by: INTERNAL MEDICINE

## 2021-04-15 PROCEDURE — 85379 FIBRIN DEGRADATION QUANT: CPT

## 2021-04-15 PROCEDURE — 700105 HCHG RX REV CODE 258: Performed by: EMERGENCY MEDICINE

## 2021-04-15 PROCEDURE — G0378 HOSPITAL OBSERVATION PER HR: HCPCS

## 2021-04-15 PROCEDURE — 99285 EMERGENCY DEPT VISIT HI MDM: CPT

## 2021-04-15 PROCEDURE — 83735 ASSAY OF MAGNESIUM: CPT | Mod: 91

## 2021-04-15 PROCEDURE — 93005 ELECTROCARDIOGRAM TRACING: CPT | Performed by: INTERNAL MEDICINE

## 2021-04-15 PROCEDURE — 700111 HCHG RX REV CODE 636 W/ 250 OVERRIDE (IP): Performed by: EMERGENCY MEDICINE

## 2021-04-15 PROCEDURE — 96376 TX/PRO/DX INJ SAME DRUG ADON: CPT

## 2021-04-15 PROCEDURE — 87491 CHLMYD TRACH DNA AMP PROBE: CPT

## 2021-04-15 PROCEDURE — 700101 HCHG RX REV CODE 250: Performed by: EMERGENCY MEDICINE

## 2021-04-15 PROCEDURE — 71045 X-RAY EXAM CHEST 1 VIEW: CPT

## 2021-04-15 PROCEDURE — 700111 HCHG RX REV CODE 636 W/ 250 OVERRIDE (IP): Performed by: STUDENT IN AN ORGANIZED HEALTH CARE EDUCATION/TRAINING PROGRAM

## 2021-04-15 PROCEDURE — 700105 HCHG RX REV CODE 258: Performed by: STUDENT IN AN ORGANIZED HEALTH CARE EDUCATION/TRAINING PROGRAM

## 2021-04-15 PROCEDURE — A9270 NON-COVERED ITEM OR SERVICE: HCPCS | Performed by: STUDENT IN AN ORGANIZED HEALTH CARE EDUCATION/TRAINING PROGRAM

## 2021-04-15 PROCEDURE — 84145 PROCALCITONIN (PCT): CPT

## 2021-04-15 PROCEDURE — 84703 CHORIONIC GONADOTROPIN ASSAY: CPT

## 2021-04-15 PROCEDURE — 85025 COMPLETE CBC W/AUTO DIFF WBC: CPT

## 2021-04-15 PROCEDURE — 87086 URINE CULTURE/COLONY COUNT: CPT

## 2021-04-15 PROCEDURE — 96365 THER/PROPH/DIAG IV INF INIT: CPT

## 2021-04-15 PROCEDURE — 87591 N.GONORRHOEAE DNA AMP PROB: CPT

## 2021-04-15 PROCEDURE — 85610 PROTHROMBIN TIME: CPT

## 2021-04-15 PROCEDURE — 700111 HCHG RX REV CODE 636 W/ 250 OVERRIDE (IP): Performed by: INTERNAL MEDICINE

## 2021-04-15 PROCEDURE — 82962 GLUCOSE BLOOD TEST: CPT

## 2021-04-15 PROCEDURE — 80048 BASIC METABOLIC PNL TOTAL CA: CPT | Mod: 91

## 2021-04-15 PROCEDURE — 80053 COMPREHEN METABOLIC PANEL: CPT

## 2021-04-15 PROCEDURE — 93005 ELECTROCARDIOGRAM TRACING: CPT | Performed by: EMERGENCY MEDICINE

## 2021-04-15 PROCEDURE — 87040 BLOOD CULTURE FOR BACTERIA: CPT

## 2021-04-15 PROCEDURE — 700117 HCHG RX CONTRAST REV CODE 255: Performed by: STUDENT IN AN ORGANIZED HEALTH CARE EDUCATION/TRAINING PROGRAM

## 2021-04-15 PROCEDURE — 74177 CT ABD & PELVIS W/CONTRAST: CPT | Mod: MG

## 2021-04-15 PROCEDURE — 700102 HCHG RX REV CODE 250 W/ 637 OVERRIDE(OP): Performed by: INTERNAL MEDICINE

## 2021-04-15 PROCEDURE — 700102 HCHG RX REV CODE 250 W/ 637 OVERRIDE(OP): Performed by: STUDENT IN AN ORGANIZED HEALTH CARE EDUCATION/TRAINING PROGRAM

## 2021-04-15 PROCEDURE — 96367 TX/PROPH/DG ADDL SEQ IV INF: CPT

## 2021-04-15 PROCEDURE — 700101 HCHG RX REV CODE 250: Performed by: STUDENT IN AN ORGANIZED HEALTH CARE EDUCATION/TRAINING PROGRAM

## 2021-04-15 PROCEDURE — 0241U HCHG SARS-COV-2 COVID-19 NFCT DS RESP RNA 4 TRGT MIC: CPT

## 2021-04-15 RX ORDER — SODIUM CHLORIDE, SODIUM LACTATE, POTASSIUM CHLORIDE, CALCIUM CHLORIDE 600; 310; 30; 20 MG/100ML; MG/100ML; MG/100ML; MG/100ML
1000 INJECTION, SOLUTION INTRAVENOUS ONCE
Status: COMPLETED | OUTPATIENT
Start: 2021-04-15 | End: 2021-04-15

## 2021-04-15 RX ORDER — OXYBUTYNIN CHLORIDE 5 MG/1
5 TABLET ORAL 2 TIMES DAILY
Status: DISCONTINUED | OUTPATIENT
Start: 2021-04-16 | End: 2021-04-19 | Stop reason: HOSPADM

## 2021-04-15 RX ORDER — ACETAMINOPHEN 325 MG/1
650 TABLET ORAL EVERY 6 HOURS PRN
Status: DISCONTINUED | OUTPATIENT
Start: 2021-04-15 | End: 2021-04-15

## 2021-04-15 RX ORDER — BACLOFEN 10 MG/1
10 TABLET ORAL 3 TIMES DAILY
Status: DISCONTINUED | OUTPATIENT
Start: 2021-04-15 | End: 2021-04-19 | Stop reason: HOSPADM

## 2021-04-15 RX ORDER — PREGABALIN 150 MG/1
300 CAPSULE ORAL 2 TIMES DAILY
Refills: 2 | Status: DISCONTINUED | OUTPATIENT
Start: 2021-04-15 | End: 2021-04-19 | Stop reason: HOSPADM

## 2021-04-15 RX ORDER — MAGNESIUM SULFATE HEPTAHYDRATE 40 MG/ML
2 INJECTION, SOLUTION INTRAVENOUS ONCE
Status: COMPLETED | OUTPATIENT
Start: 2021-04-15 | End: 2021-04-15

## 2021-04-15 RX ORDER — MAGNESIUM SULFATE HEPTAHYDRATE 500 MG/ML
1 INJECTION, SOLUTION INTRAMUSCULAR; INTRAVENOUS ONCE
Status: DISCONTINUED | OUTPATIENT
Start: 2021-04-15 | End: 2021-04-15

## 2021-04-15 RX ORDER — METHYLPREDNISOLONE SODIUM SUCCINATE 125 MG/2ML
62.5 INJECTION, POWDER, LYOPHILIZED, FOR SOLUTION INTRAMUSCULAR; INTRAVENOUS EVERY 6 HOURS
Status: DISCONTINUED | OUTPATIENT
Start: 2021-04-15 | End: 2021-04-16

## 2021-04-15 RX ORDER — POLYETHYLENE GLYCOL 3350 17 G/17G
1 POWDER, FOR SOLUTION ORAL
Status: DISCONTINUED | OUTPATIENT
Start: 2021-04-15 | End: 2021-04-19 | Stop reason: HOSPADM

## 2021-04-15 RX ORDER — SODIUM CHLORIDE, SODIUM LACTATE, POTASSIUM CHLORIDE, AND CALCIUM CHLORIDE .6; .31; .03; .02 G/100ML; G/100ML; G/100ML; G/100ML
1000 INJECTION, SOLUTION INTRAVENOUS ONCE
Status: COMPLETED | OUTPATIENT
Start: 2021-04-15 | End: 2021-04-15

## 2021-04-15 RX ORDER — DOXYCYCLINE 100 MG/1
100 TABLET ORAL EVERY 12 HOURS
Status: DISCONTINUED | OUTPATIENT
Start: 2021-04-15 | End: 2021-04-15

## 2021-04-15 RX ORDER — MYCOPHENOLATE MOFETIL 250 MG/1
1000 CAPSULE ORAL 2 TIMES DAILY
Status: DISCONTINUED | OUTPATIENT
Start: 2021-04-15 | End: 2021-04-16

## 2021-04-15 RX ORDER — BUDESONIDE AND FORMOTEROL FUMARATE DIHYDRATE 80; 4.5 UG/1; UG/1
2 AEROSOL RESPIRATORY (INHALATION)
Status: DISCONTINUED | OUTPATIENT
Start: 2021-04-15 | End: 2021-04-19 | Stop reason: HOSPADM

## 2021-04-15 RX ORDER — BENZONATATE 100 MG/1
100 CAPSULE ORAL 3 TIMES DAILY PRN
Status: DISCONTINUED | OUTPATIENT
Start: 2021-04-15 | End: 2021-04-19 | Stop reason: HOSPADM

## 2021-04-15 RX ORDER — METHYLPREDNISOLONE SODIUM SUCCINATE 125 MG/2ML
125 INJECTION, POWDER, LYOPHILIZED, FOR SOLUTION INTRAMUSCULAR; INTRAVENOUS EVERY 6 HOURS
Status: DISCONTINUED | OUTPATIENT
Start: 2021-04-15 | End: 2021-04-15

## 2021-04-15 RX ORDER — ONDANSETRON 2 MG/ML
4 INJECTION INTRAMUSCULAR; INTRAVENOUS ONCE
Status: COMPLETED | OUTPATIENT
Start: 2021-04-15 | End: 2021-04-15

## 2021-04-15 RX ORDER — ALBUTEROL SULFATE 90 UG/1
2 AEROSOL, METERED RESPIRATORY (INHALATION)
Status: DISCONTINUED | OUTPATIENT
Start: 2021-04-15 | End: 2021-04-19 | Stop reason: HOSPADM

## 2021-04-15 RX ORDER — METOPROLOL SUCCINATE 25 MG/1
12.5 TABLET, EXTENDED RELEASE ORAL EVERY EVENING
Status: DISCONTINUED | OUTPATIENT
Start: 2021-04-15 | End: 2021-04-19 | Stop reason: HOSPADM

## 2021-04-15 RX ORDER — OMEPRAZOLE 20 MG/1
20 CAPSULE, DELAYED RELEASE ORAL DAILY
Status: DISCONTINUED | OUTPATIENT
Start: 2021-04-15 | End: 2021-04-19 | Stop reason: HOSPADM

## 2021-04-15 RX ORDER — BISACODYL 10 MG
10 SUPPOSITORY, RECTAL RECTAL
Status: DISCONTINUED | OUTPATIENT
Start: 2021-04-15 | End: 2021-04-19 | Stop reason: HOSPADM

## 2021-04-15 RX ORDER — DIPHENHYDRAMINE HCL 25 MG
25 TABLET ORAL ONCE
Status: COMPLETED | OUTPATIENT
Start: 2021-04-15 | End: 2021-04-15

## 2021-04-15 RX ORDER — MAGNESIUM SULFATE 1 G/100ML
1 INJECTION INTRAVENOUS ONCE
Status: DISCONTINUED | OUTPATIENT
Start: 2021-04-15 | End: 2021-04-15

## 2021-04-15 RX ORDER — POTASSIUM CHLORIDE 20 MEQ/1
60 TABLET, EXTENDED RELEASE ORAL ONCE
Status: COMPLETED | OUTPATIENT
Start: 2021-04-15 | End: 2021-04-15

## 2021-04-15 RX ORDER — IPRATROPIUM BROMIDE AND ALBUTEROL SULFATE 2.5; .5 MG/3ML; MG/3ML
3 SOLUTION RESPIRATORY (INHALATION) ONCE
Status: COMPLETED | OUTPATIENT
Start: 2021-04-15 | End: 2021-04-15

## 2021-04-15 RX ORDER — SODIUM CHLORIDE, SODIUM LACTATE, POTASSIUM CHLORIDE, AND CALCIUM CHLORIDE .6; .31; .03; .02 G/100ML; G/100ML; G/100ML; G/100ML
30 INJECTION, SOLUTION INTRAVENOUS ONCE
Status: COMPLETED | OUTPATIENT
Start: 2021-04-15 | End: 2021-04-15

## 2021-04-15 RX ORDER — MONTELUKAST SODIUM 10 MG/1
10 TABLET ORAL NIGHTLY
Status: DISCONTINUED | OUTPATIENT
Start: 2021-04-15 | End: 2021-04-19 | Stop reason: HOSPADM

## 2021-04-15 RX ORDER — OXCARBAZEPINE 300 MG/1
300 TABLET, FILM COATED ORAL 2 TIMES DAILY
Status: DISCONTINUED | OUTPATIENT
Start: 2021-04-15 | End: 2021-04-19 | Stop reason: HOSPADM

## 2021-04-15 RX ORDER — IPRATROPIUM BROMIDE AND ALBUTEROL SULFATE 2.5; .5 MG/3ML; MG/3ML
3 SOLUTION RESPIRATORY (INHALATION)
Status: DISCONTINUED | OUTPATIENT
Start: 2021-04-15 | End: 2021-04-16

## 2021-04-15 RX ORDER — METHYLPREDNISOLONE SODIUM SUCCINATE 40 MG/ML
40 INJECTION, POWDER, LYOPHILIZED, FOR SOLUTION INTRAMUSCULAR; INTRAVENOUS EVERY 6 HOURS
Status: DISCONTINUED | OUTPATIENT
Start: 2021-04-15 | End: 2021-04-15

## 2021-04-15 RX ORDER — AMOXICILLIN 250 MG
2 CAPSULE ORAL 2 TIMES DAILY
Status: DISCONTINUED | OUTPATIENT
Start: 2021-04-15 | End: 2021-04-19 | Stop reason: HOSPADM

## 2021-04-15 RX ORDER — ACETAMINOPHEN 500 MG
1000 TABLET ORAL 3 TIMES DAILY PRN
Status: DISCONTINUED | OUTPATIENT
Start: 2021-04-15 | End: 2021-04-18

## 2021-04-15 RX ORDER — MONTELUKAST SODIUM 4 MG/1
4 TABLET, CHEWABLE ORAL NIGHTLY
Status: DISCONTINUED | OUTPATIENT
Start: 2021-04-15 | End: 2021-04-15

## 2021-04-15 RX ORDER — PREDNISONE 20 MG/1
20 TABLET ORAL 2 TIMES DAILY
Status: DISCONTINUED | OUTPATIENT
Start: 2021-04-15 | End: 2021-04-15

## 2021-04-15 RX ORDER — ENALAPRILAT 1.25 MG/ML
1.25 INJECTION INTRAVENOUS EVERY 6 HOURS PRN
Status: DISCONTINUED | OUTPATIENT
Start: 2021-04-15 | End: 2021-04-19 | Stop reason: HOSPADM

## 2021-04-15 RX ORDER — ONDANSETRON 4 MG/1
4 TABLET, ORALLY DISINTEGRATING ORAL EVERY 4 HOURS PRN
Status: DISCONTINUED | OUTPATIENT
Start: 2021-04-15 | End: 2021-04-19 | Stop reason: HOSPADM

## 2021-04-15 RX ORDER — METHYLPREDNISOLONE SODIUM SUCCINATE 125 MG/2ML
125 INJECTION, POWDER, LYOPHILIZED, FOR SOLUTION INTRAMUSCULAR; INTRAVENOUS ONCE
Status: COMPLETED | OUTPATIENT
Start: 2021-04-15 | End: 2021-04-15

## 2021-04-15 RX ORDER — DEXTROSE MONOHYDRATE 25 G/50ML
50 INJECTION, SOLUTION INTRAVENOUS
Status: DISCONTINUED | OUTPATIENT
Start: 2021-04-15 | End: 2021-04-19 | Stop reason: HOSPADM

## 2021-04-15 RX ORDER — LEVOTHYROXINE SODIUM 0.1 MG/1
100 TABLET ORAL
Status: DISCONTINUED | OUTPATIENT
Start: 2021-04-16 | End: 2021-04-19 | Stop reason: HOSPADM

## 2021-04-15 RX ORDER — MAGNESIUM SULFATE 1 G/100ML
1 INJECTION INTRAVENOUS ONCE
Status: COMPLETED | OUTPATIENT
Start: 2021-04-15 | End: 2021-04-15

## 2021-04-15 RX ORDER — FLUOXETINE HYDROCHLORIDE 20 MG/1
40 CAPSULE ORAL DAILY
Refills: 0 | Status: DISCONTINUED | OUTPATIENT
Start: 2021-04-16 | End: 2021-04-19 | Stop reason: HOSPADM

## 2021-04-15 RX ORDER — BUSPIRONE HYDROCHLORIDE 10 MG/1
10 TABLET ORAL 3 TIMES DAILY
Status: DISCONTINUED | OUTPATIENT
Start: 2021-04-15 | End: 2021-04-19 | Stop reason: HOSPADM

## 2021-04-15 RX ORDER — TOPIRAMATE 100 MG/1
50 TABLET, FILM COATED ORAL 2 TIMES DAILY
Status: DISCONTINUED | OUTPATIENT
Start: 2021-04-15 | End: 2021-04-19 | Stop reason: HOSPADM

## 2021-04-15 RX ORDER — DIPHENHYDRAMINE HYDROCHLORIDE 50 MG/ML
25 INJECTION INTRAMUSCULAR; INTRAVENOUS ONCE
Status: COMPLETED | OUTPATIENT
Start: 2021-04-15 | End: 2021-04-15

## 2021-04-15 RX ORDER — ZIPRASIDONE HYDROCHLORIDE 40 MG/1
40 CAPSULE ORAL 2 TIMES DAILY
Status: DISCONTINUED | OUTPATIENT
Start: 2021-04-15 | End: 2021-04-19 | Stop reason: HOSPADM

## 2021-04-15 RX ORDER — PANTOPRAZOLE SODIUM 40 MG/10ML
40 INJECTION, POWDER, LYOPHILIZED, FOR SOLUTION INTRAVENOUS ONCE
Status: DISCONTINUED | OUTPATIENT
Start: 2021-04-15 | End: 2021-04-15

## 2021-04-15 RX ORDER — FLUTICASONE PROPIONATE 110 UG/1
2 AEROSOL, METERED RESPIRATORY (INHALATION) 2 TIMES DAILY
Status: DISCONTINUED | OUTPATIENT
Start: 2021-04-15 | End: 2021-04-15

## 2021-04-15 RX ORDER — TRAZODONE HYDROCHLORIDE 50 MG/1
100 TABLET ORAL
Status: DISCONTINUED | OUTPATIENT
Start: 2021-04-15 | End: 2021-04-19 | Stop reason: HOSPADM

## 2021-04-15 RX ADMIN — MEROPENEM 500 MG: 500 INJECTION, POWDER, FOR SOLUTION INTRAVENOUS at 22:59

## 2021-04-15 RX ADMIN — SODIUM CHLORIDE, POTASSIUM CHLORIDE, SODIUM LACTATE AND CALCIUM CHLORIDE 1000 ML: 600; 310; 30; 20 INJECTION, SOLUTION INTRAVENOUS at 16:35

## 2021-04-15 RX ADMIN — IPRATROPIUM BROMIDE AND ALBUTEROL SULFATE 3 ML: .5; 2.5 SOLUTION RESPIRATORY (INHALATION) at 14:51

## 2021-04-15 RX ADMIN — ALBUTEROL SULFATE 2 PUFF: 90 AEROSOL, METERED RESPIRATORY (INHALATION) at 17:37

## 2021-04-15 RX ADMIN — ONDANSETRON 4 MG: 4 TABLET, ORALLY DISINTEGRATING ORAL at 23:42

## 2021-04-15 RX ADMIN — IPRATROPIUM BROMIDE AND ALBUTEROL SULFATE 3 ML: .5; 2.5 SOLUTION RESPIRATORY (INHALATION) at 22:10

## 2021-04-15 RX ADMIN — PREGABALIN 300 MG: 150 CAPSULE ORAL at 22:58

## 2021-04-15 RX ADMIN — ONDANSETRON 4 MG: 2 INJECTION INTRAMUSCULAR; INTRAVENOUS at 06:39

## 2021-04-15 RX ADMIN — MONTELUKAST 10 MG: 10 TABLET, FILM COATED ORAL at 21:37

## 2021-04-15 RX ADMIN — DOXYCYCLINE 100 MG: 100 TABLET, FILM COATED ORAL at 09:55

## 2021-04-15 RX ADMIN — INSULIN LISPRO 1 UNITS: 100 INJECTION, SOLUTION INTRAVENOUS; SUBCUTANEOUS at 21:41

## 2021-04-15 RX ADMIN — ACETAMINOPHEN 1000 MG: 500 TABLET, FILM COATED ORAL at 21:37

## 2021-04-15 RX ADMIN — INSULIN LISPRO 1 UNITS: 100 INJECTION, SOLUTION INTRAVENOUS; SUBCUTANEOUS at 17:16

## 2021-04-15 RX ADMIN — CEFTRIAXONE SODIUM 1 G: 1 INJECTION, POWDER, FOR SOLUTION INTRAMUSCULAR; INTRAVENOUS at 09:55

## 2021-04-15 RX ADMIN — MAGNESIUM SULFATE 2 G: 2 INJECTION INTRAVENOUS at 16:59

## 2021-04-15 RX ADMIN — IPRATROPIUM BROMIDE AND ALBUTEROL SULFATE 3 ML: .5; 2.5 SOLUTION RESPIRATORY (INHALATION) at 05:57

## 2021-04-15 RX ADMIN — SODIUM CHLORIDE, POTASSIUM CHLORIDE, SODIUM LACTATE AND CALCIUM CHLORIDE 1000 ML: 600; 310; 30; 20 INJECTION, SOLUTION INTRAVENOUS at 12:45

## 2021-04-15 RX ADMIN — SODIUM CHLORIDE, POTASSIUM CHLORIDE, SODIUM LACTATE AND CALCIUM CHLORIDE 1000 ML: 600; 310; 30; 20 INJECTION, SOLUTION INTRAVENOUS at 15:04

## 2021-04-15 RX ADMIN — DIPHENHYDRAMINE HYDROCHLORIDE 25 MG: 25 TABLET ORAL at 09:55

## 2021-04-15 RX ADMIN — IOHEXOL 100 ML: 350 INJECTION, SOLUTION INTRAVENOUS at 16:07

## 2021-04-15 RX ADMIN — MAGNESIUM SULFATE 1 G: 1 INJECTION INTRAVENOUS at 07:30

## 2021-04-15 RX ADMIN — IPRATROPIUM BROMIDE AND ALBUTEROL SULFATE 3 ML: .5; 2.5 SOLUTION RESPIRATORY (INHALATION) at 10:14

## 2021-04-15 RX ADMIN — METHYLPREDNISOLONE SODIUM SUCCINATE 125 MG: 125 INJECTION, POWDER, FOR SOLUTION INTRAMUSCULAR; INTRAVENOUS at 06:19

## 2021-04-15 RX ADMIN — METHYLPREDNISOLONE SODIUM SUCCINATE 125 MG: 125 INJECTION, POWDER, FOR SOLUTION INTRAMUSCULAR; INTRAVENOUS at 12:45

## 2021-04-15 RX ADMIN — ENOXAPARIN SODIUM 40 MG: 40 INJECTION SUBCUTANEOUS at 09:54

## 2021-04-15 RX ADMIN — METHYLPREDNISOLONE SODIUM SUCCINATE 62.5 MG: 125 INJECTION, POWDER, FOR SOLUTION INTRAMUSCULAR; INTRAVENOUS at 17:10

## 2021-04-15 RX ADMIN — POTASSIUM CHLORIDE 60 MEQ: 1500 TABLET, EXTENDED RELEASE ORAL at 09:55

## 2021-04-15 RX ADMIN — SODIUM CHLORIDE, POTASSIUM CHLORIDE, SODIUM LACTATE AND CALCIUM CHLORIDE 1000 ML: 600; 310; 30; 20 INJECTION, SOLUTION INTRAVENOUS at 06:19

## 2021-04-15 RX ADMIN — MEROPENEM 500 MG: 500 INJECTION, POWDER, FOR SOLUTION INTRAVENOUS at 16:58

## 2021-04-15 RX ADMIN — TRAZODONE HYDROCHLORIDE 100 MG: 100 TABLET ORAL at 21:37

## 2021-04-15 RX ADMIN — OMEPRAZOLE 20 MG: 20 CAPSULE, DELAYED RELEASE ORAL at 12:45

## 2021-04-15 RX ADMIN — METHYLPREDNISOLONE SODIUM SUCCINATE 62.5 MG: 125 INJECTION, POWDER, FOR SOLUTION INTRAMUSCULAR; INTRAVENOUS at 23:42

## 2021-04-15 RX ADMIN — IPRATROPIUM BROMIDE AND ALBUTEROL SULFATE 3 ML: .5; 2.5 SOLUTION RESPIRATORY (INHALATION) at 07:47

## 2021-04-15 RX ADMIN — ALBUTEROL SULFATE 2 PUFF: 90 AEROSOL, METERED RESPIRATORY (INHALATION) at 12:06

## 2021-04-15 RX ADMIN — ACETAMINOPHEN 1000 MG: 500 TABLET, FILM COATED ORAL at 12:47

## 2021-04-15 RX ADMIN — BUDESONIDE AND FORMOTEROL FUMARATE DIHYDRATE 2 PUFF: 80; 4.5 AEROSOL RESPIRATORY (INHALATION) at 14:45

## 2021-04-15 RX ADMIN — BACLOFEN 10 MG: 10 TABLET ORAL at 12:45

## 2021-04-15 RX ADMIN — IPRATROPIUM BROMIDE AND ALBUTEROL SULFATE 3 ML: .5; 2.5 SOLUTION RESPIRATORY (INHALATION) at 19:19

## 2021-04-15 RX ADMIN — DIPHENHYDRAMINE HYDROCHLORIDE 25 MG: 50 INJECTION INTRAMUSCULAR; INTRAVENOUS at 12:06

## 2021-04-15 RX ADMIN — FAMOTIDINE 20 MG: 10 INJECTION INTRAVENOUS at 12:45

## 2021-04-15 ASSESSMENT — ENCOUNTER SYMPTOMS
ABDOMINAL PAIN: 0
MUSCULOSKELETAL NEGATIVE: 1
DOUBLE VISION: 0
VOMITING: 0
PALPITATIONS: 1
FOCAL WEAKNESS: 0
NAUSEA: 1
SHORTNESS OF BREATH: 1
FEVER: 1
COUGH: 1
DIARRHEA: 1
CHILLS: 0
DEPRESSION: 0
DIAPHORESIS: 0
BLOOD IN STOOL: 1
HEADACHES: 1
WEAKNESS: 0
VOMITING: 1
EYE PAIN: 0
HEMOPTYSIS: 0
NERVOUS/ANXIOUS: 0
FEVER: 0
CONSTIPATION: 0
HEADACHES: 0
ORTHOPNEA: 1
CHILLS: 1
MYALGIAS: 1
SPUTUM PRODUCTION: 0
SORE THROAT: 0
FLANK PAIN: 0
BLURRED VISION: 1
DIZZINESS: 1
WHEEZING: 1
ABDOMINAL PAIN: 1
PALPITATIONS: 0
NERVOUS/ANXIOUS: 1

## 2021-04-15 ASSESSMENT — FIBROSIS 4 INDEX
FIB4 SCORE: 0.44
FIB4 SCORE: 0.58

## 2021-04-15 ASSESSMENT — COPD QUESTIONNAIRES
DURING THE PAST 4 WEEKS HOW MUCH DID YOU FEEL SHORT OF BREATH: MOST  OR ALL OF THE TIME
HAVE YOU SMOKED AT LEAST 100 CIGARETTES IN YOUR ENTIRE LIFE: NO/DON'T KNOW
DO YOU EVER COUGH UP ANY MUCUS OR PHLEGM?: YES, A FEW DAYS A WEEK OR MONTH
COPD SCREENING SCORE: 4

## 2021-04-15 ASSESSMENT — PAIN DESCRIPTION - PAIN TYPE
TYPE: CHRONIC PAIN;ACUTE PAIN
TYPE: ACUTE PAIN

## 2021-04-15 NOTE — ASSESSMENT & PLAN NOTE
History of blood per rectum intermittently this last week per patient  Hematocrit normal  Has not had any bowel movements since arrival  Monitor for bleeding and Hemoccult stools  GI consultation as needed  CT abdomen/pelvis negative

## 2021-04-15 NOTE — SENIOR ADMIT NOTE
"SENIOR ADMIT NOTE:    Sari Virk M.D.  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 Also had fever 100F w/ chills. Patient initially went to urgent care on 4/12/2021, and was started on prednisone and Zithromax.  She continued to have worsening symptoms and came to ER 4/13, was given duo-nebs and prednisone 60mg, and discharged home. She returned today for no relief of symptoms.   Patient also complains of new onset katheryn colored diarrhea X 1 week, preceded by lower abdominal pain(up to 5 episodes/day), nausea w/o vomiting.  In ER, she was tachypneic 25, heart rate 60s to 70s, saturating greater than 95% room air.  Labs show potassium 3.3, lactic acid 3.5. Respiratory panel(covid, influenza, RSV) negative. Chest x-ray showing left lung base opacity bronchitis; and EKG unchanged from prior.  She received Solu-Medrol 125 mg, nebulization, mag sulfate 1 g, LR 1 L bolus.    Physical Exam:  Weight/BMI: Body mass index is 40.72 kg/m².  /58   Pulse 63   Temp 36 °C (96.8 °F) (Temporal)   Resp (!) 22   Ht 1.651 m (5' 5\")   Wt 111 kg (244 lb 11.4 oz)   SpO2 96%   Vitals:    04/15/21 0509 04/15/21 0510 04/15/21 0557 04/15/21 0749   BP:  115/58     Pulse:  76 65 63   Resp:  (!) 22 (!) 25 (!) 22   Temp:  36 °C (96.8 °F)     TempSrc:  Temporal     SpO2:  99% 96% 96%   Weight: 111 kg (244 lb 11.4 oz)      Height: 1.651 m (5' 5\")        Oxygen Therapy:  " Pulse Oximetry: 96 %, O2 (LPM): 0, O2 Delivery Device: None - Room Air    Physical exam:  Constitutional:  In acute distress: Tachypneic, with audible wheezing, not able to speak in complete sentences(dyspneic after 5-6 words). A&O x3  HENMT:  Normocephalic, Atraumatic  Eyes:  PERRLA, EOMI, Conjunctiva normal  Neck:  Supple, Full range of motion, No stridor  Cardiovascular:  Regular rate and rhythm, No murmurs, No rubs, No gallops.   Lungs:  Expiratory wheezing heard in bilateral upper and midlung fields, no rales  Extremities: Good pedal pulses b/l, no edema, 5/5 strength.  Abdomen: Bowel sounds x4, Soft, Non-tender, Non-distended, No guarding, No rebound, No masses.  Neurologic: Good sensations, Cranial nerves II through XII grossly intact. No focal deficits noted.    Imaging  DX-CHEST-PORTABLE (1 VIEW)   Final Result         1.  Slight hazy left lung base opacity suggests infiltrate   2.  Perihilar interstitial prominence and bronchial wall cuffing, appearance suggests changes of underlying bronchial inflammation, consider bronchitis.          ASSESSMENT/PLAN:  -Acute asthma exacerbation, secondary to Acute bronchitis  -Lactic acidosis  -Hypokalemia  -T2DM  -Paroxysmal Afib  -Chronic urine retention, bowel /bladder incontinence s/p spinal cord stimulator(complete sacral neuromodulation 3/21)  -Immunocompromised, on Cellcept   -Hypothyroidism  -TONYA on CPAP,  -Peripheral neuropathy   -Schizoaffective disorder, PTSD  -GERD     -Admit to observation/telemetry  -Stat ABG  -Solumedrol 125mg q6h, taper to q12h with improvement  -Ceftriaxone, Doxycyline(not on azithromycin as QTc prolongation)  -Duoneb, albuterol per RT protocol  -Stress ulcer ppx  -Repeat lactic acid, IV fluids and replete electrolytes  -Resume home meds    For full details please refer to H&P done by Dr. Ramey.     Sari Virk M.D.

## 2021-04-15 NOTE — PROGRESS NOTES
Patient arrived to room with this RN. Patient increased work of breathing. MD at bedside. MD ordered labs. Breathing treatment ordered. Rt bedside.

## 2021-04-15 NOTE — ASSESSMENT & PLAN NOTE
But sugar running in the 200-250 range  Serial blood sugar testing  SSI as needed  Hypoglycemia protocols as needed  HgA1c

## 2021-04-15 NOTE — ED NOTES
Pt incontinent of urine, sheets changed, new brief provided. Pt given new gown and warm blankets. Commode placed to bedside in an attempt to prevent future episodes of incontinence, pt has call light and states she knows when she needs to urinate and will use the call light for help with assistance to commode. Mag gtt started, LR continues to infuse. Admit team at bedside. Text out to respiratory therapist to request neb tx per admit physician.

## 2021-04-15 NOTE — ED NOTES
Torres from Lab called with critical result of lactic acid of 5.8 at 1145. Critical lab result read back to Torres.   Dr. Ramey notified of critical lab result at 1150.  Critical lab result read back by Dr. Ramey.

## 2021-04-15 NOTE — ED NOTES
Med Rec completed per patient at bedside with med list   Allergies reviewed    doyxcycline 100 mg bid for 7 days started 04/12/2021.

## 2021-04-15 NOTE — ASSESSMENT & PLAN NOTE
Clinically appropriate encourage behavioral modification and weight loss  History of TONYA not on treatment, monitor while in ICU  Most recent TSH normal

## 2021-04-15 NOTE — ASSESSMENT & PLAN NOTE
Resume home regimen when clinically appropriate  Part list multiple other psychiatric diagnoses  Psychiatry consultation as needed

## 2021-04-15 NOTE — PROGRESS NOTES
ASSIST RN: Tessa from Lab called with critical result of lactic 6.6 at 1451. Critical lab result read back to Tessa.   Dr. Ramey notified of critical lab result at 1453.  Critical lab result read back by Dr. Ramey.

## 2021-04-15 NOTE — ASSESSMENT & PLAN NOTE
Main issue appears to be metabolic acidosis with elevated/trending up lactic acid  DDx includes: sepsis, b-agonist, dehydration, type B

## 2021-04-15 NOTE — ASSESSMENT & PLAN NOTE
This is Sepsis Present on admission  SIRS criteria identified on my evaluation include: Tachycardia, with heart rate greater than 90 BPM and Tachypnea, with respirations greater than 20 per minute  Source is abdomen?  Lung? Urine?  Suspected onset of infection less than 1 week  Sepsis protocol initiated  Fluid resuscitation ordered per protocol  IV antibiotics as appropriate for source of sepsis  While organ dysfunction may be noted elsewhere in this problem list or in the chart, degree of organ dysfunction does not meet CMS criteria for severe sepsis    CT without obvious source of sepsis  Cultures negative thus far

## 2021-04-15 NOTE — CONSULTS
Critical Care Consultation    Date of consult: 4/15/2021    Referring Physician  Kadeem Alvarenga M.D.    Reason for Consultation  Lactic acidosis    History of Presenting Illness  30 y.o. female with long complex history despite her young age including asthma which she self treats with home nebulizer and has been on antibiotics and prednisone the last few days who also has too numerous to count allergies, see the list who presented 4/15/2021 with shortness of breath and was initially seen on 4/13.  She was given additional breathing treatments and started on IV Solu-Medrol and IV antibiotics.  Chest x-ray did not reveal pneumonic process.  She had some nausea without vomiting and she had diarrhea 5 times a day for a week with some blood at times.  She is got some right lower quadrant pain has a history of known 3.5 cm ovarian cyst.  Her last CT abdomen a few months ago did not reveal any major issues but she had IUD at that time.  Patient is not sure if she has had it removed.  Pregnancy test was negative.  She denied UTI and URI symptoms.  She denies any sexual activity and she is not having any vaginal discharge.  She does have some chest discomfort with breathing at times.  She has not been eating or sleeping well the last few days.  When her asthma flares she always has that same problem.  She is initially mated to the CDU with a lactic acid of 3.5 earlier today which is trended up to 5.8 and 6.6 and I received a call for transfer to the ICU.  Work of breathing has not been a problem.  And she is tired not somnolent and easily aroused.  Blood gases show worsening metabolic acidosis.  Oxygenation is normal.  An echocardiogram done on 3/6/2021 and it was grossly normal including no right-sided abnormalities.  She was started on doxycycline and ceftriaxone.  She is allergic to the majority of antibiotics I would give to cover the abdomen and ID is consulted emergently for assistance, the resident spoke with   Junito.    Code Status  Full Code    Review of Systems  Review of Systems   Constitutional: Positive for malaise/fatigue. Negative for chills, diaphoresis and fever.   HENT: Positive for congestion (acute on chronic, has allergies). Negative for nosebleeds and sore throat.    Respiratory: Positive for cough, shortness of breath and wheezing. Negative for hemoptysis and sputum production.    Cardiovascular: Positive for chest pain (Pleuritic and occurs when her asthma flares). Negative for palpitations and leg swelling.   Gastrointestinal: Positive for abdominal pain, blood in stool, diarrhea, nausea and vomiting.   Genitourinary: Negative for dysuria, flank pain and frequency.   Musculoskeletal: Negative.    Neurological: Negative for focal weakness and headaches.   Psychiatric/Behavioral: The patient is not nervous/anxious.        Past Medical History   has a past medical history of Abdominal pain, Anginal syndrome, Apnea, sleep, Arrhythmia, Arthritis, ASTHMA, Atrial fibrillation ([facility]), Back pain, Borderline personality disorder ([facility]), Breath shortness, Bronchitis, Cardiac arrhythmia, Chickenpox, Chronic UTI (9/18/2010), COPD (chronic obstructive pulmonary disease) ([facility]), Cough, Daytime sleepiness, Dental disorder (03/08/2021), Depression, Diabetes ([facility]), Diarrhea, Disorder of thyroid, Fall, Fatigue, Frequent headaches, Gasping for breath, Gynecological disorder, Headache(784.0), Heart burn, Heart murmur, History of falling, Indigestion, Migraine, 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    Surgical  History   has a past surgical history that includes neuro dest facet l/s w/ig sngl (4/21/2015); recovery (1/27/2016); [place] by laparoscopy (8/29/2010); lumbar fusion anterior (8/21/2012); other cardiac surgery (1/2016); tonsillectomy; bowel stimulator insertion (7/13/2016); gastroscopy with balloon dilatation (N/A, 1/4/2017); muscle biopsy (Right, 1/26/2017); other abdominal surgery; laminotomy; and bowel stimulator insertion (3/10/2021).    Family History  family history includes Genitourinary () Problems in her sister; Heart Disease in her maternal grandmother and mother; Hypertension in her maternal grandmother, maternal uncle, and mother; No Known Problems in her sister; Other in her mother and sister; Sleep Apnea in her mother.    Social History   reports that she has never smoked. She has never used smokeless tobacco. She reports previous drug use. Frequency: 7.00 times per week. Drug: Marijuana. She reports that she does not drink alcohol.    Medications  Home Medications     Reviewed by Antonio Scott PhT (Pharmacy Tech) on 04/15/21 at 0739  Med List Status: Complete   Medication Last Dose Status   albuterol 108 (90 Base) MCG/ACT Aero Soln inhalation aerosol 4/15/2021 Active   aspirin 81 MG EC tablet 4/15/2021 Active   baclofen (LIORESAL) 10 MG Tab 4/15/2021 Active   benzonatate (TESSALON) 100 MG Cap PRN Active   busPIRone (BUSPAR) 30 MG tablet 4/15/2021 Active   calcium carbonate (TUMS EX) 750 MG chewable tablet Not Taking Active   doxycycline (VIBRAMYCIN) 100 MG Tab 4/15/2021 Active   Dulaglutide (TRULICITY) 1.5 MG/0.5ML Solution Pen-injector 4/9/2021 Active   fluoxetine (PROZAC) 40 MG capsule 4/15/2021 Active   fluticasone (FLOVENT HFA) 220 MCG/ACT Aerosol Not Taking Active   ipratropium-albuterol (DUONEB) 0.5-2.5 (3) MG/3ML nebulizer solution 4/15/2021 Active   ivabradine (CORLANOR) 7.5 MG Tab tablet 4/15/2021 Active   levothyroxine (SYNTHROID) 100 MCG Tab 4/15/2021 Active   Melatonin 10  MG Tab 4/15/2021 Active   metoprolol SR (TOPROL XL) 25 MG TABLET SR 24 HR 4/14/2021 Active   Mirabegron ER (MYRBETRIQ) 50 MG TABLET SR 24 HR 4/15/2021 Active   mycophenolate (CELLCEPT) 500 MG tablet 4/15/2021 Active   ondansetron (ZOFRAN ODT) 4 MG TABLET DISPERSIBLE Not Taking Active   OXcarbazepine (TRILEPTAL) 300 MG Tab 4/15/2021 Active   oxybutynin (DITROPAN) 5 MG Tab Not Taking Active   potassium chloride SA (KDUR) 20 MEQ Tab CR 4/15/2021 Active   predniSONE (DELTASONE) 20 MG Tab 4/15/2021 Active   pregabalin (LYRICA) 300 MG capsule 4/15/2021 Active   sodium bicarbonate 325 MG Tab 4/15/2021 Active   topiramate (TOPAMAX) 50 MG tablet 4/15/2021 Active   traZODone (DESYREL) 100 MG Tab 4/14/2021 Active   vitamin D, Ergocalciferol, (DRISDOL) 1.25 MG (47583 UT) Cap capsule 4/9/2021 Active   ziprasidone (GEODON) 40 MG Cap 4/15/2021 Active              Current Facility-Administered Medications   Medication Dose Route Frequency Provider Last Rate Last Admin   • albuterol inhaler 2 Puff  2 Puff Inhalation Q2HRS PRN Alec Ramey M.D.   2 Puff at 04/15/21 1206   • ipratropium-albuterol (DUONEB) nebulizer solution  3 mL Nebulization Q4HRS (RT) Alec Ramey M.D.   3 mL at 04/15/21 1451   • acetaminophen (TYLENOL) tablet 1,000 mg  1,000 mg Oral TID PRN Sari Virk M.D.   1,000 mg at 04/15/21 1247   • senna-docusate (PERICOLACE or SENOKOT S) 8.6-50 MG per tablet 2 tablet  2 tablet Oral BID Alec Ramey M.D.        And   • polyethylene glycol/lytes (MIRALAX) PACKET 1 Packet  1 Packet Oral QDAY PRN Alec Ramey M.D.        And   • magnesium hydroxide (MILK OF MAGNESIA) suspension 30 mL  30 mL Oral QDAY PRN Alec Ramey M.D.        And   • bisacodyl (DULCOLAX) suppository 10 mg  10 mg Rectal QDAY PRN Alec Ramey M.D.       • Respiratory Therapy Consult   Nebulization Continuous RT Alec Ramey M.D.       • enalaprilat (VASOTEC) injection 1.25 mg  1.25 mg Intravenous Q6HRS PRN  Alec Ramey M.D.       • [START ON 4/16/2021] aspirin EC (ECOTRIN) tablet 81 mg  81 mg Oral QAM Alec Ramey M.D.       • baclofen (LIORESAL) tablet 10 mg  10 mg Oral TID Alec Ramey M.D.   10 mg at 04/15/21 1245   • benzonatate (TESSALON) capsule 100 mg  100 mg Oral TID PRN Alec Ramey M.D.       • busPIRone (BUSPAR) tablet 10 mg  10 mg Oral TID Alec Ramey M.D.       • [START ON 4/16/2021] FLUoxetine (PROZAC) capsule 40 mg  40 mg Oral DAILY Alec Ramey M.D.       • ivabradine (Corlanor) tablet 7.5 mg  7.5 mg Oral BID WITH MEALS Alec Ramey M.D.       • [START ON 4/16/2021] levothyroxine (SYNTHROID) tablet 100 mcg  100 mcg Oral AM ES Alec Ramey M.D.       • metoprolol SR (TOPROL XL) tablet 12.5 mg  12.5 mg Oral Q EVENING Alec Ramey M.D.       • mycophenolate (CELLCEPT) capsule 1,000 mg  1,000 mg Oral BID Alec Ramey M.D.       • OXcarbazepine (TRILEPTAL) tablet 300 mg  300 mg Oral BID Alec Ramey M.D.       • pregabalin (LYRICA) capsule 300 mg  300 mg Oral BID Alec Ramey M.D.       • topiramate (TOPAMAX) tablet 50 mg  50 mg Oral BID Alec Ramey M.D.       • traZODone (DESYREL) tablet 100 mg  100 mg Oral QHS Alec Ramey M.D.       • ziprasidone (GEODON) capsule 40 mg  40 mg Oral BID Alec Ramey M.D.       • [START ON 4/16/2021] oxybutynin (DITROPAN) tablet 5 mg  5 mg Oral BID Alec Ramey M.D.       • omeprazole (PRILOSEC) capsule 20 mg  20 mg Oral DAILY Sari Virk M.D.   20 mg at 04/15/21 1245   • magnesium sulfate IVPB premix 2 g  2 g Intravenous Once Sari Virk M.D.       • insulin lispro (HumaLOG) injection  1-6 Units Subcutaneous 4X/DAY DONOVAN Ramey M.D.        And   • glucose 4 g chewable tablet 16 g  16 g Oral Q15 MIN PRN Alec Ramey M.D.        And   • dextrose 50% (D50W) injection 50 mL  50 mL Intravenous Q15 MIN PRN Alec Ramey M.D.       •  "budesonide-formoterol (SYMBICORT) 80-4.5 MCG/ACT inhaler 2 Puff  2 Puff Inhalation BID (RT) Sari Virk M.D.   2 Puff at 04/15/21 1445   • montelukast (SINGULAIR) tablet 10 mg  10 mg Oral Nightly Sari Virk M.D.       • meropenem (MERREM) 500 mg in  mL IVPB  500 mg Intravenous Q6HRS Sari Virk M.D.       • lactated ringers infusion (BOLUS)  1,000 mL Intravenous Once Sari Virk M.D.       • methylPREDNISolone sod succ (SOLU-MEDROL) 125 MG injection 62.5 mg  62.5 mg Intravenous Q6HRS John Brown M.D.           Allergies  Allergies   Allergen Reactions   • Depakote [Divalproex Sodium] Unspecified     Muscle spasms/muscle pain and weakness     • Amitriptyline Unspecified     Headaches     • Aripiprazole [Abilify] Unspecified     Headaches/muscle twitching     • Clindamycin Nausea     Even with food     • Flagyl [Metronidazole Hcl] Unspecified     \"eye problems\"     • Flomax [Tamsulosin Hydrochloride] Swelling   • Levaquin Unspecified     Severe muscle cramps in legs  RXN=unknown   • Metformin Unspecified     Increased lactic acid      • Tamsulosin Swelling     Swelling of legs   • Tape Rash     Tears skin off  coban with Tegaderm tape ok intermittently  RXN=ongoing   • Vancomycin Itching     Pt becomes flushed in face and chest.   RXN=7/10/16   • Wound Dressing Adhesive Hives     By pt report   • Ampicillin Rash     Pt reports that she received a rash    • Ciprofloxacin Rash         • Keflex Rash     Pt states she gets a rash with this medication  Tolerates ceftriaxone  Can take with Benadryl   • Levofloxacin Unspecified     Leg muscle cramps   • Metronidazole Rash     \"Vision problems\"   • Penicillins Hives     Can take with Benadryl   • Sulfa Drugs Itching and Myalgia     Muscle pain and weakness   • Valproic Acid Rash     .       Vital Signs last 24 hours  Temp:  [36 °C (96.8 °F)-36.3 °C (97.4 °F)] 36.3 °C (97.4 °F)  Pulse:  [63-85] 85  Resp:  [13-25] 13  BP: (115)/(58) 115/58  SpO2:  [95 %-99 " %] 99 %    Physical Exam  Physical Exam  Vitals reviewed.   Constitutional:       General: She is sleeping.      Appearance: She is morbidly obese. She is ill-appearing. She is not toxic-appearing.      Interventions: Nasal cannula in place.   HENT:      Head: Normocephalic and atraumatic.      Mouth/Throat:      Mouth: Mucous membranes are dry.      Comments: M&P III-IV  Eyes:      General: No scleral icterus.     Extraocular Movements: Extraocular movements intact.      Pupils: Pupils are equal, round, and reactive to light.   Neck:      Vascular: No JVD.      Trachea: Phonation normal.   Cardiovascular:      Rate and Rhythm: Normal rate and regular rhythm.  No extrasystoles are present.     Heart sounds: No murmur. No gallop.       Comments: SR  Pulmonary:      Effort: Pulmonary effort is normal. No tachypnea, accessory muscle usage, respiratory distress or retractions.      Breath sounds: No stridor. Decreased breath sounds present. No wheezing, rhonchi or rales.   Abdominal:      General: Abdomen is protuberant. Bowel sounds are normal.      Tenderness: There is generalized abdominal tenderness and tenderness in the right lower quadrant. There is no right CVA tenderness, left CVA tenderness or guarding. Negative signs include Coon's sign.   Musculoskeletal:      Cervical back: Neck supple. No rigidity.      Right lower leg: No edema.   Lymphadenopathy:      Cervical: No cervical adenopathy.   Skin:     General: Skin is warm and dry.      Capillary Refill: Capillary refill takes less than 2 seconds.      Coloration: Skin is not cyanotic or mottled.      Findings: No rash or wound.      Nails: There is no clubbing.   Neurological:      General: No focal deficit present.      Mental Status: She is easily aroused.      GCS: GCS eye subscore is 3. GCS verbal subscore is 5. GCS motor subscore is 6.      Cranial Nerves: Cranial nerves are intact.      Sensory: Sensation is intact.      Motor: Motor function is  intact.      Comments: S&O x 4, follows well, arouses easily, primarily appears and complains of being tired/sleepy   Psychiatric:         Mood and Affect: Mood is not anxious.         Speech: Speech normal.         Behavior: Behavior is not agitated. Behavior is cooperative.         Cognition and Memory: Cognition normal.         Fluids    Intake/Output Summary (Last 24 hours) at 4/15/2021 1605  Last data filed at 4/15/2021 1034  Gross per 24 hour   Intake 1100 ml   Output --   Net 1100 ml       Laboratory  Recent Results (from the past 48 hour(s))   EKG (NOW)    Collection Time: 04/15/21  5:12 AM   Result Value Ref Range    Report       [facility] Emergency Dept.    Test Date:  2021-04-15  Pt Name:    HARLEY DE LA CRUZ              Department: ER  MRN:        [id number]                      Room:       [facility]  Gender:     Female                       Technician: [id number]  :        1990                   Requested By:SYLWIA PÉREZ  Order #:    [id number]                    Reading MD:    Measurements  Intervals                                Axis  Rate:       72                           P:          19  WI:         152                          QRS:        29  QRSD:       98                           T:          -6  QT:         442  QTc:        484    Interpretive Statements  SINUS RHYTHM  BORDERLINE T ABNORMALITIES, ANTERIOR LEADS  BORDERLINE PROLONGED QT INTERVAL  Compared to ECG 2021 15:24:46  T-wave abnormality now present     CBC WITH DIFFERENTIAL    Collection Time: 04/15/21  5:35 AM   Result Value Ref Range    WBC 5.0 4.8 - 10.8 K/uL    RBC 4.05 (L) 4.20 - 5.40 M/uL    Hemoglobin 11.7 (L) 12.0 - 16.0 g/dL    Hematocrit 37.8 37.0 - 47.0 %    MCV 93.3 81.4 - 97.8 fL    MCH 28.9 27.0 - 33.0 pg    MCHC 31.0 (L) 33.6 - 35.0 g/dL    RDW 49.1 35.9 - 50.0 fL    Platelet Count 172 164 - 446 K/uL    MPV 12.2 9.0 - 12.9 fL    Neutrophils-Polys 63.90 44.00 - 72.00 %    Lymphocytes 25.00 22.00 -  41.00 %    Monocytes 8.70 0.00 - 13.40 %    Eosinophils 0.40 0.00 - 6.90 %    Basophils 0.40 0.00 - 1.80 %    Immature Granulocytes 1.60 (H) 0.00 - 0.90 %    Nucleated RBC 0.40 /100 WBC    Neutrophils (Absolute) 3.22 2.00 - 7.15 K/uL    Lymphs (Absolute) 1.26 1.00 - 4.80 K/uL    Monos (Absolute) 0.44 0.00 - 0.85 K/uL    Eos (Absolute) 0.02 0.00 - 0.51 K/uL    Baso (Absolute) 0.02 0.00 - 0.12 K/uL    Immature Granulocytes (abs) 0.08 0.00 - 0.11 K/uL    NRBC (Absolute) 0.02 K/uL   COMP METABOLIC PANEL    Collection Time: 04/15/21  5:35 AM   Result Value Ref Range    Sodium 136 135 - 145 mmol/L    Potassium 3.3 (L) 3.6 - 5.5 mmol/L    Chloride 104 96 - 112 mmol/L    Co2 22 20 - 33 mmol/L    Anion Gap 10.0 7.0 - 16.0    Glucose 261 (H) 65 - 99 mg/dL    Bun 10 8 - 22 mg/dL    Creatinine 0.61 0.50 - 1.40 mg/dL    Calcium 8.8 8.5 - 10.5 mg/dL    AST(SGOT) 11 (L) 12 - 45 U/L    ALT(SGPT) 18 2 - 50 U/L    Alkaline Phosphatase 90 30 - 99 U/L    Total Bilirubin <0.2 0.1 - 1.5 mg/dL    Albumin 4.2 3.2 - 4.9 g/dL    Total Protein 6.1 6.0 - 8.2 g/dL    Globulin 1.9 1.9 - 3.5 g/dL    A-G Ratio 2.2 g/dL   TROPONIN    Collection Time: 04/15/21  5:35 AM   Result Value Ref Range    Troponin T 7 6 - 19 ng/L   BETA-HCG QUALITATIVE SERUM    Collection Time: 04/15/21  5:35 AM   Result Value Ref Range    Beta-Hcg Qualitative Serum Negative Negative   LACTIC ACID    Collection Time: 04/15/21  5:35 AM   Result Value Ref Range    Lactic Acid 3.5 (H) 0.5 - 2.0 mmol/L   D-DIMER    Collection Time: 04/15/21  5:35 AM   Result Value Ref Range    D-Dimer Screen <0.27 0.00 - 0.50 ug/mL (FEU)   PROCALCITONIN    Collection Time: 04/15/21  5:35 AM   Result Value Ref Range    Procalcitonin <0.05 <0.25 ng/mL   ESTIMATED GFR    Collection Time: 04/15/21  5:35 AM   Result Value Ref Range    GFR If African American >60 >60 mL/min/1.73 m 2    GFR If Non African American >60 >60 mL/min/1.73 m 2   MAGNESIUM    Collection Time: 04/15/21  5:35 AM   Result Value  Ref Range    Magnesium 1.7 1.5 - 2.5 mg/dL   COV-2, FLU A/B, AND RSV BY PCR (2-4 HOURS CEPHEID): Collect NP swab in VTM    Collection Time: 04/15/21  6:20 AM    Specimen: Nasopharyngeal; Respirate   Result Value Ref Range    Influenza virus A RNA Negative Negative    Influenza virus B, PCR Negative Negative    RSV, PCR Negative Negative    SARS-CoV-2 by PCR NotDetected     SARS-CoV-2 Source NP Swab    ABG - LAB    Collection Time: 04/15/21  8:24 AM   Result Value Ref Range    Ph 7.38 (L) 7.40 - 7.50    Pco2 33.6 26.0 - 37.0 mmHg    Po2 79.1 64.0 - 87.0 mmHg    O2 Saturation 95.1 93.0 - 99.0 %    Hco3 19 17 - 25 mmol/L    Base Excess -5 (L) -4 - 3 mmol/L    Body Temp see below Centigrade   LACTIC ACID    Collection Time: 04/15/21 10:40 AM   Result Value Ref Range    Lactic Acid 5.8 (HH) 0.5 - 2.0 mmol/L   Basic Metabolic Panel    Collection Time: 04/15/21 10:40 AM   Result Value Ref Range    Sodium 141 135 - 145 mmol/L    Potassium 3.8 3.6 - 5.5 mmol/L    Chloride 107 96 - 112 mmol/L    Co2 19 (L) 20 - 33 mmol/L    Glucose 215 (H) 65 - 99 mg/dL    Bun 8 8 - 22 mg/dL    Creatinine 0.64 0.50 - 1.40 mg/dL    Calcium 8.8 8.5 - 10.5 mg/dL    Anion Gap 15.0 7.0 - 16.0   ESTIMATED GFR    Collection Time: 04/15/21 10:40 AM   Result Value Ref Range    GFR If African American >60 >60 mL/min/1.73 m 2    GFR If Non African American >60 >60 mL/min/1.73 m 2   ABG - LAB    Collection Time: 04/15/21  1:43 PM   Result Value Ref Range    Ph 7.32 (L) 7.40 - 7.50    Pco2 34.9 26.0 - 37.0 mmHg    Po2 104.3 (H) 64.0 - 87.0 mmHg    O2 Saturation 97.0 93.0 - 99.0 %    Hco3 18 17 - 25 mmol/L    Base Excess -8 (L) -4 - 3 mmol/L    Body Temp see below Centigrade   LACTIC ACID    Collection Time: 04/15/21  2:30 PM   Result Value Ref Range    Lactic Acid 6.6 (HH) 0.5 - 2.0 mmol/L   TROPONIN    Collection Time: 04/15/21  2:30 PM   Result Value Ref Range    Troponin T <6 6 - 19 ng/L   EKG    Collection Time: 04/15/21  3:13 PM   Result Value Ref  Range    Report       [facility]    Test Date:  2021-04-15  Pt Name:    HARLEY DE LA CRUZ              Department: CPU  MRN:        [id number]                      Room:       T201  Gender:     Female                       Technician: YULIYA  :        1990                   Requested By:JOHN PEÑA  Order #:    [id number]                    Reading MD:    Measurements  Intervals                                Axis  Rate:       84                           P:          7  CO:         136                          QRS:        9  QRSD:       90                           T:          -5  QT:         408  QTc:        483    Interpretive Statements  SINUS RHYTHM  BORDERLINE T ABNORMALITIES, INFERIOR LEADS  BORDERLINE PROLONGED QT INTERVAL  Compared to ECG 04/15/2021 05:12:57  No significant changes         Imaging  DX-CHEST-PORTABLE (1 VIEW)   Final Result      1.  Minimal linear atelectasis or fibrotic change in the left lower lobe.      2.  Otherwise clear chest.      DX-CHEST-PORTABLE (1 VIEW)   Final Result         1.  Slight hazy left lung base opacity suggests infiltrate   2.  Perihilar interstitial prominence and bronchial wall cuffing, appearance suggests changes of underlying bronchial inflammation, consider bronchitis.      IR-US GUIDED PIV    (Results Pending)   CT-ABDOMEN-PELVIS WITH    (Results Pending)       Assessment/Plan  * Sepsis (HCC)  Assessment & Plan  This is Sepsis Present on admission  SIRS criteria identified on my evaluation include: Tachycardia, with heart rate greater than 90 BPM and Tachypnea, with respirations greater than 20 per minute  Source is abdomen?  Lung?  Suspected onset of infection less than 1 week  Sepsis protocol initiated  Fluid resuscitation ordered per protocol  IV antibiotics as appropriate for source of sepsis  While organ dysfunction may be noted elsewhere in this problem list or in the chart, degree of organ dysfunction does not meet CMS criteria for severe  sepsis    Patient presented with shortness of breath and felt to have asthma exacerbation.  On exam no more concern of abdominal pain and GI symptoms then asthma.  Frequent albuterol treatments certainly could precipitate acidosis although that is rare.  She has a known 3.5 cm right ovarian cyst and on her last CT couple months ago she did have an IUD in place.  Exam reveals right lower quadrant tenderness without rigidity or guarding.  She has an extremely complex antibiotic allergy history.  Patient is on CellCept and thus immune compromised.  History of prior UTI with ESBL, no UA yet, history of retention and prior catheter.    Moved to ICU  Sepsis protocols, initiate fluid bolus and trend lactic acid  Repeat blood cultures  UA with C&S, may need bladder scan and in out catheter or Andrade for a few days  CT abdomen/pelvis, may need surgical consultation  Pelvic exam and if IUD is present remove and consider GYN consult  Stat ID consult for antibiotic regimen to cover abdomen pelvis  Consider stopping CellCept  If able reduce albuterol    Lactic acidosis  Assessment & Plan  La issue appears to be metabolic acidosis with elevated/trending up lactic acid  Query sepsis syndrome  Much less likely secondary to nebulizer treatments?  Request pharmacy review of her extensive medication list, i.e. drug interactions    Asthma exacerbation  Assessment & Plan  History of asthma, uses home nebulizer  Status post antibiotics, prednisone and nebulizer x48 hours after ER visit without improvement  X-ray does not reveal pneumonia  Some dyspnea may be related to metabolic acidosis  IV steroids  Q4H nebulizer treatments and every 2 hours as needed  Monitor for the need for continuous nebulizer  RT protocols  Monitor for the need BiPAP and/or intubation mechanical ventilation, current work of breathing is normal and oxygenation is good    Hyperglycemia  Assessment & Plan  But sugar running in the 200-2 50 range  No history of  diabetes  Not on Metformin and thus this is not an etiology for lactic acidosis, looking for other meds with pharmacy  Serial blood sugar testing  SSI as needed  Hypoglycemia protocols as needed  A1c    Blood per rectum  Assessment & Plan  History of blood per rectum intermittently this last week per patient  Hematocrit normal  Monitor for bleeding and Hemoccult stools  GI consultation as needed  CT abdomen/pelvis pending    Schizoaffective disorder, depressive type (HCC)- (present on admission)  Assessment & Plan  Resume home regimen when clinically appropriate  Part list multiple other psychiatric diagnoses  Psychiatry consultation as needed    Hypothyroidism- (present on admission)  Assessment & Plan  Recent TSH normal  Continue home repletion regimen    GERD (gastroesophageal reflux disease)- (present on admission)  Assessment & Plan  PPI  Antireflux measures    Morbidly obese (HCC)- (present on admission)  Assessment & Plan  Clinically appropriate encourage behavioral modification and weight loss  History of TONYA not on treatment, monitor while in ICU  Most recent TSH normal      Discussed patient condition and risk of morbidity and/or mortality with RN, RT, Patient, infectious disease and [facility] IM and ICU charge nurse.    The patient remains critically ill.  Critical care time = 50 minutes in directly providing and coordinating critical care and extensive data review.  No time overlap and excludes procedures.

## 2021-04-15 NOTE — ASSESSMENT & PLAN NOTE
History of asthma, uses home nebulizer  Status post antibiotics, prednisone and nebulizer x48 hours after ER visit without improvement  X-ray does not reveal pneumonia  No bronchospasm on exam  Dyspnea likely related to metabolic acidosis  Decrease IV steroids to BID  Duoneb every 2 hours as needed  RT/O2 Protocols  Titrate supplemental FiO2 to maintain SpO2 >92%  Monitor for the need BiPAP and/or intubation mechanical ventilation, current work of breathing is normal and oxygenation is good

## 2021-04-15 NOTE — ED TRIAGE NOTES
"Chief Complaint   Patient presents with   • Shortness of Breath   • Asthma     Pt BIB [facility] for above complaints. SOB onset yesterday, worsening tonight, no relief from home interventions. Audible expiratory wheezing noted on arrival, EMS administered x1 albuterol treatment. She was recently diagnosed with bronchitis and has been taking steroids and antibiotics.     /58   Pulse 76   Temp 36 °C (96.8 °F) (Temporal)   Resp (!) 22   Ht 1.651 m (5' 5\")   Wt 111 kg (244 lb 11.4 oz)   SpO2 99%     "

## 2021-04-15 NOTE — ED PROVIDER NOTES
"ED Provider Note    CHIEF COMPLAINT  Chief Complaint   Patient presents with   • Shortness of Breath   • Asthma        [facility]    Primary care provider: Sue Preston M.D.   History obtained from: Patient  History limited by: None     Kristin Balderrama is a 30 y.o. female who presents to the ED by EMS complaining of worsening shortness of breath since yesterday.  Patient was seen in this ED on April 13 for cough and shortness of breath and reports that she has been taking the prescribed prednisone as prescribed without improvement.  Her symptoms started about 3 days prior to her last ED visit.  Patient reports using her nebulizer treatment at home without much improvement and was given another nebulizer treatment by EMS with mild improvement.  She reports a fever up to 100.  She has also been taking her antibiotic without improvement.  She reports having nasal congestion, sore throat, loss of smell and taste and coughing with green mucus.  She denies any recent travels or known ill contacts.  She has had some nausea without vomiting.  She reports having a couple episodes of diarrhea yesterday and noticed that there may be blood in her stools.  She denies dysuria or possibility of pregnancy.  She has not noticed any rash or swelling.  She denies any pain except for sharp tight chest pain without radiation.  She reports having asthma for \"many years\" usually triggered either by cold or heat or allergies.    REVIEW OF SYSTEMS  Please see HPI for pertinent positives/negatives.  All other systems reviewed and are negative.     PAST MEDICAL HISTORY  Past Medical History:   Diagnosis Date   • Dental disorder 03/08/2021    infected tooth   • Other fatigue 6/29/2020   • Sinus tachycardia 10/31/2013   • Pain 08-15-12    back, 7/10   • Chronic UTI 9/18/2010   • Abdominal pain    • Anginal syndrome     random chest pain especially with tachycardia   • Apnea, sleep    • Arrhythmia     \"sinus tachycardia\", cariologistDr. " "Kumar; ablation 2/2016   • Arthritis     osteo   • ASTHMA     when around smoke   • Atrial fibrillation (HCC)    • Back pain    • Borderline personality disorder (HCC)    • Breath shortness     with tachycardia   • Bronchitis    • Cardiac arrhythmia    • Chickenpox    • COPD (chronic obstructive pulmonary disease) (HCC)    • Cough    • Daytime sleepiness    • Depression    • Diabetes (HCC)    • Diarrhea     incontinece   • Disorder of thyroid    • Fall    • Fatigue    • Frequent headaches    • Gasping for breath    • Gynecological disorder     PCOS   • Headache(784.0)    • Heart burn    • Heart murmur    • History of falling    • Indigestion    • Migraine    • Mitochondrial disease (HCC)    • 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HCC)    • Nausea    • Obesity    • Painful joint    • PCOS (polycystic ovarian syndrome)    • Pneumonia 2012 12/2020   • Psychosis (HCC)    • Ringing in ears    • Scoliosis    • Shortness of breath     O2 as needed   • Sleep apnea     CPAP \"pulmonary doctor took me off mid year 2016\"   • Snoring    • Tonsillitis    • 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[facility])    • Tuberculosis     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   • Urinary bladder disorder     Suprapubic cath   • Urinary incontinence    • Weakness    • Wears glasses         SURGICAL HISTORY  Past Surgical History:   Procedure Laterality Date   • BOWEL STIMULATOR INSERTION  3/10/2021    Procedure: INSERTION, ELECTRODE LEADS AND PULSE GENERATOR, NEUROSTIMULATOR, SACRAL - REMOVAL OF INTERSTIM WITH REPLACEMENT OF SACRAL NEUROMODULATION DEVICE;  Surgeon: Joe Noyola M.D.;  Location: SURGERY [facility];  Service: General   • MUSCLE BIOPSY Right 1/26/2017    Procedure: MUSCLE BIOPSY - THIGH;  Surgeon: Isidro Vigil M.D.;  Location: [facility];  Service:    • GASTROSCOPY WITH BALLOON DILATATION N/A 1/4/2017    Procedure: GASTROSCOPY WITH DILATATION;  Surgeon: Torres Vargas M.D.;  Location[facility];  Service:    • BOWEL " STIMULATOR INSERTION  7/13/2016    Procedure: BOWEL STIMULATOR INSERTION FOR PERMANENT INTERSTIM SACRAL IMPLANT;  Surgeon: Joe Noyola M.D.;  Location: SURGERY [facility];  Service:    • RECOVERY  1/27/2016    Procedure: CATH LAB EP STUDY WITH SINUS NODE MODIFICATION ABHINAV;  Surgeon: Lina Surgery;  Location: SURGERY PRE-POST PROC UNIT [facility];  Service:    • OTHER CARDIAC SURGERY  1/2016    cardiac ablation   • NEURO DEST FACET L/S W/IG SNGL  4/21/2015    Performed by Reza Tabor at SURGERY [facility]   • LUMBAR FUSION ANTERIOR  8/21/2012    Performed by SUSEI SAWANT at SURGERY [facility]   • ALYSSA BY LAPAROSCOPY  8/29/2010    Performed by SHAYY JOHNS at SURGERY [facility]   • LAMINOTOMY     • OTHER ABDOMINAL SURGERY     • TONSILLECTOMY      tonsillectomy        SOCIAL HISTORY  Social History     Tobacco Use   • Smoking status: Never Smoker   • Smokeless tobacco: Never Used   Substance and Sexual Activity   • Alcohol use: No     Alcohol/week: 0.0 oz   • Drug use: Not Currently     Frequency: 7.0 times per week     Types: Marijuana   • Sexual activity: Not Currently     Birth control/protection: Pill        FAMILY HISTORY  Family History   Problem Relation Age of Onset   • Hypertension Mother    • Sleep Apnea Mother    • Heart Disease Mother    • Other Mother         hypothryod   • Hypertension Maternal Uncle    • Heart Disease Maternal Grandmother    • Hypertension Maternal Grandmother    • No Known Problems Sister    • Other Sister         Narcolepsy;fibromyalsia;bone;nerve   • Genitourinary () Problems Sister         endometriosis        CURRENT MEDICATIONS  Home Medications     Reviewed by Antonio Mccarthy Out, PhT (Pharmacy Tech) on 04/15/21 at 0739  Med List Status: Complete   Medication Last Dose Status   albuterol 108 (90 Base) MCG/ACT Aero Soln inhalation aerosol 4/15/2021 Active   aspirin 81 MG EC tablet 4/15/2021 Active   baclofen (LIORESAL) 10 MG Tab  "4/15/2021 Active   benzonatate (TESSALON) 100 MG Cap PRN Active   busPIRone (BUSPAR) 30 MG tablet 4/15/2021 Active   calcium carbonate (TUMS EX) 750 MG chewable tablet Not Taking Active   doxycycline (VIBRAMYCIN) 100 MG Tab 4/15/2021 Active   Dulaglutide (TRULICITY) 1.5 MG/0.5ML Solution Pen-injector 4/9/2021 Active   fluoxetine (PROZAC) 40 MG capsule 4/15/2021 Active   fluticasone (FLOVENT HFA) 220 MCG/ACT Aerosol Not Taking Active   ipratropium-albuterol (DUONEB) 0.5-2.5 (3) MG/3ML nebulizer solution 4/15/2021 Active   ivabradine (CORLANOR) 7.5 MG Tab tablet 4/15/2021 Active   levothyroxine (SYNTHROID) 100 MCG Tab 4/15/2021 Active   Melatonin 10 MG Tab 4/15/2021 Active   metoprolol SR (TOPROL XL) 25 MG TABLET SR 24 HR 4/14/2021 Active   Mirabegron ER (MYRBETRIQ) 50 MG TABLET SR 24 HR 4/15/2021 Active   mycophenolate (CELLCEPT) 500 MG tablet 4/15/2021 Active   ondansetron (ZOFRAN ODT) 4 MG TABLET DISPERSIBLE Not Taking Active   OXcarbazepine (TRILEPTAL) 300 MG Tab 4/15/2021 Active   oxybutynin (DITROPAN) 5 MG Tab Not Taking Active   potassium chloride SA (KDUR) 20 MEQ Tab CR 4/15/2021 Active   predniSONE (DELTASONE) 20 MG Tab 4/15/2021 Active   pregabalin (LYRICA) 300 MG capsule 4/15/2021 Active   sodium bicarbonate 325 MG Tab 4/15/2021 Active   topiramate (TOPAMAX) 50 MG tablet 4/15/2021 Active   traZODone (DESYREL) 100 MG Tab 4/14/2021 Active   vitamin D, Ergocalciferol, (DRISDOL) 1.25 MG (33972 UT) Cap capsule 4/9/2021 Active   ziprasidone (GEODON) 40 MG Cap 4/15/2021 Active                 ALLERGIES  Allergies   Allergen Reactions   • Depakote [Divalproex Sodium] Unspecified     Muscle spasms/muscle pain and weakness     • Amitriptyline Unspecified     Headaches     • Aripiprazole [Abilify] Unspecified     Headaches/muscle twitching     • Clindamycin Nausea     Even with food     • Flagyl [Metronidazole Hcl] Unspecified     \"eye problems\"     • Flomax [Tamsulosin Hydrochloride] Swelling   • Levaquin Unspecified " "    Severe muscle cramps in legs  RXN=unknown   • Metformin Unspecified     Increased lactic acid      • Tamsulosin Swelling     Swelling of legs   • Tape Rash     Tears skin off  coban with Tegaderm tape ok intermittently  RXN=ongoing   • Vancomycin Itching     Pt becomes flushed in face and chest.   RXN=7/10/16   • Wound Dressing Adhesive Hives     By pt report   • Ampicillin Rash     Pt reports that she received a rash    • Ciprofloxacin Rash         • Keflex Rash     Pt states she gets a rash with this medication  Tolerates ceftriaxone  Can take with Benadryl   • Levofloxacin Unspecified     Leg muscle cramps   • Metronidazole Rash     \"Vision problems\"   • Penicillins Hives     Can take with Benadryl   • Sulfa Drugs Itching and Myalgia     Muscle pain and weakness   • Valproic Acid Rash     .        PHYSICAL EXAM  VITAL SIGNS: /58   Pulse 65   Temp 36 °C (96.8 °F) (Temporal)   Resp (!) 25   Ht 1.651 m (5' 5\")   Wt 111 kg (244 lb 11.4 oz)   SpO2 96%   BMI 40.72 kg/m²  @JODI[[id number]::@     Pulse ox interpretation: 99% I interpret this pulse ox as normal     Cardiac monitor interpretation: Sinus rhythm with heart rate in the 70s as interpreted by me.  The patient presented with shortness of breath and chest discomfort and cardiac monitor was ordered to monitor for dysrhythmia.    Constitutional: Well developed, well nourished, alert, nontoxic appearance    HENT: No external signs of trauma, normocephalic, mask on due to COVID-19 pandemic  Eyes: PERRL, conjunctiva without erythema, no discharge, no icterus    Neck: Soft and supple, trachea midline, no stridor, no tenderness, no LAD, no JVD, good ROM    Cardiovascular: Regular rate and rhythm, no murmurs/rubs/gallops, strong distal pulses and good perfusion    Thorax & Lungs: Mild tachypnea, diffuse bilateral expiratory wheezing  Abdomen: Soft, nontender, nondistended, no guarding, no rebound, normal BS    Back: No CVAT    Extremities: No cyanosis, no " edema, no gross deformity, good ROM, no tenderness, intact distal pulses with brisk cap refill    Skin: Warm, dry, no pallor/cyanosis, no rash noted    Lymphatic: No lymphadenopathy noted    Neuro: A/O times 3, no focal deficits noted    Psychiatric: Cooperative, normal mood and affect, normal judgement, appropriate for clinical situation        DIAGNOSTIC STUDIES / PROCEDURES    EKG  12 Lead EKG obtained at 1712 and interpreted by me:   Rate: 72   Rhythm: Sinus rhythm   Ectopy: None  Intervals: Normal   Axis: Normal   QRS: Normal   ST segments: Normal  T Waves: Flattened    Clinical Impression: Sinus rhythm with nonspecific T wave changes  Compared to April 8, 2021 without significant change      LABS  All labs reviewed by me.     Results for orders placed or performed during the hospital encounter of 04/15/21   CBC WITH DIFFERENTIAL   Result Value Ref Range    WBC 5.0 4.8 - 10.8 K/uL    RBC 4.05 (L) 4.20 - 5.40 M/uL    Hemoglobin 11.7 (L) 12.0 - 16.0 g/dL    Hematocrit 37.8 37.0 - 47.0 %    MCV 93.3 81.4 - 97.8 fL    MCH 28.9 27.0 - 33.0 pg    MCHC 31.0 (L) 33.6 - 35.0 g/dL    RDW 49.1 35.9 - 50.0 fL    Platelet Count 172 164 - 446 K/uL    MPV 12.2 9.0 - 12.9 fL    Neutrophils-Polys 63.90 44.00 - 72.00 %    Lymphocytes 25.00 22.00 - 41.00 %    Monocytes 8.70 0.00 - 13.40 %    Eosinophils 0.40 0.00 - 6.90 %    Basophils 0.40 0.00 - 1.80 %    Immature Granulocytes 1.60 (H) 0.00 - 0.90 %    Nucleated RBC 0.40 /100 WBC    Neutrophils (Absolute) 3.22 2.00 - 7.15 K/uL    Lymphs (Absolute) 1.26 1.00 - 4.80 K/uL    Monos (Absolute) 0.44 0.00 - 0.85 K/uL    Eos (Absolute) 0.02 0.00 - 0.51 K/uL    Baso (Absolute) 0.02 0.00 - 0.12 K/uL    Immature Granulocytes (abs) 0.08 0.00 - 0.11 K/uL    NRBC (Absolute) 0.02 K/uL   COMP METABOLIC PANEL   Result Value Ref Range    Sodium 136 135 - 145 mmol/L    Potassium 3.3 (L) 3.6 - 5.5 mmol/L    Chloride 104 96 - 112 mmol/L    Co2 22 20 - 33 mmol/L    Anion Gap 10.0 7.0 - 16.0     Glucose 261 (H) 65 - 99 mg/dL    Bun 10 8 - 22 mg/dL    Creatinine 0.61 0.50 - 1.40 mg/dL    Calcium 8.8 8.5 - 10.5 mg/dL    AST(SGOT) 11 (L) 12 - 45 U/L    ALT(SGPT) 18 2 - 50 U/L    Alkaline Phosphatase 90 30 - 99 U/L    Total Bilirubin <0.2 0.1 - 1.5 mg/dL    Albumin 4.2 3.2 - 4.9 g/dL    Total Protein 6.1 6.0 - 8.2 g/dL    Globulin 1.9 1.9 - 3.5 g/dL    A-G Ratio 2.2 g/dL   TROPONIN   Result Value Ref Range    Troponin T 7 6 - 19 ng/L   BETA-HCG QUALITATIVE SERUM   Result Value Ref Range    Beta-Hcg Qualitative Serum Negative Negative   LACTIC ACID   Result Value Ref Range    Lactic Acid 3.5 (H) 0.5 - 2.0 mmol/L   COV-2, FLU A/B, AND RSV BY PCR (2-4 HOURS CEPHEID): Collect NP swab in VTM    Specimen: Nasopharyngeal; Respirate   Result Value Ref Range    Influenza virus A RNA Negative Negative    Influenza virus B, PCR Negative Negative    RSV, PCR Negative Negative    SARS-CoV-2 by PCR NotDetected     SARS-CoV-2 Source NP Swab    D-DIMER   Result Value Ref Range    D-Dimer Screen <0.27 0.00 - 0.50 ug/mL (FEU)   PROCALCITONIN   Result Value Ref Range    Procalcitonin <0.05 <0.25 ng/mL   ESTIMATED GFR   Result Value Ref Range    GFR If African American >60 >60 mL/min/1.73 m 2    GFR If Non African American >60 >60 mL/min/1.73 m 2   EKG (NOW)   Result Value Ref Range    Report       [facility] Emergency Dept.    Test Date:  2021-04-15  Pt Name:    HARLEY DE LA CRUZ              Department: ER  MRN:        [id number]                      Room:        20  Gender:     Female                       Technician: [id number]  :        [id number]                   Requested By:SYLWIA PÉREZ  Order #:    [id number]                    Reading MD:    Measurements  Intervals                                Axis  Rate:       72                           P:          19  GA:         152                          QRS:        29  QRSD:       98                           T:          -6  QT:         442  QTc:         484    Interpretive Statements  SINUS RHYTHM  BORDERLINE T ABNORMALITIES, ANTERIOR LEADS  BORDERLINE PROLONGED QT INTERVAL  Compared to ECG 04/08/2021 15:24:46  T-wave abnormality now present       *Note: Due to a large number of results and/or encounters for the requested time period, some results have not been displayed. A complete set of results can be found in Results Review.        RADIOLOGY  The radiologist's interpretation of all radiological studies have been reviewed by me.     DX-CHEST-PORTABLE (1 VIEW)   Final Result         1.  Slight hazy left lung base opacity suggests infiltrate   2.  Perihilar interstitial prominence and bronchial wall cuffing, appearance suggests changes of underlying bronchial inflammation, consider bronchitis.             COURSE & MEDICAL DECISION MAKING  Nursing notes, VS, PMSFHx reviewed in chart.     Review of past medical records shows the patient was last seen in this ED April 13, 2021 for shortness of breath and cough.      Differential diagnoses considered include but are not limited to: AMI, pericardial effusion/tamponade, pericarditis, CHF/pulm edema, PE, pneumothorax, pneumonia, pleural effusion, asthma, bronchospasm, bronchitis, respiratory infection, DKA, sepsis, metabolic acidosis, anxiety/hyperventilation       0708: D/W hospitalist who will admit patient      History and physical exam as above.  Patient with history of asthma and noted to have bilateral expiratory wheezing with mild tachypnea.  Work-up was initiated and patient was treated with IV Solu-Medrol, magnesium and DuoNeb treatment.  She also received IV fluid due to reported hyperglycemia.  EKG showed sinus rhythm with nonspecific T wave changes without significant change compared to prior.  Labs showed mild anemia that appears stable compared to her recent results.  Patient also with mild hypokalemia which could be due to her albuterol treatment.  She has hyperglycemia without evidence of DKA.  This may  be due to her recent steroid treatment or stress reaction.  Lactic acid was elevated but with negative procalcitonin and patient clinically does not appear to be septic.  Chest x-ray with findings as above.  I discussed the findings with the patient.  She continues to have wheezing and shortness of breath and would like to be admitted.  Suspect asthma exacerbation.  Discussed with the hospitalist who graciously agreed to admit patient for further care.      HYDRATION: Based on the patient's presentation of Hyperglycemia the patient was given IV fluids. IV Hydration was used because oral hydration was not as rapid as required. Upon recheck following hydration, the patient was unchanged.      FINAL IMPRESSION  1. Dyspnea, unspecified type Acute   2. Wheezing Acute   3. Intermittent asthma with acute exacerbation, unspecified asthma severity Acute          DISPOSITION  Patient will be admitted by hospitalist for further care      Electronically signed by: Yair Diallo D.O., 4/15/2021 5:17 AM      Portions of this record were made with voice recognition software.  Despite my review, spelling/grammar/context errors may still remain.  Interpretation of this chart should be taken in this context.

## 2021-04-15 NOTE — H&P
"History & Physical Note    Date of Admission: 4/15/2021  Admission Status: Observation-Outpatient  [facility] Team: DINESH  Attending: Kadeem Alvarenga M.D.   Senior Resident: Dr. Sari Virk  Intern: Dr. Alec Ramey  Contact Number: [phone]    Chief Complaint: \"I cant breathe.\"     History of Present Illness (HPI):   Kristin is a 30 y.o. female with complex medical history including asthma (requiring oral steroids 2-3x year, on  Albuterol/fluticasone), COPD (bronchitis predominant, 3L oxygen at baseline), TONYA (oxygen, no CPAP), PCOS, DM2 (trulicity), psychosis, bladder incontinence; who presented 4/15/2021 with worsened shortness of breath, weakness, generalised pain; admitted for acute asthma exacerbation; found to have lactic acidosis without attributable source.    Has had 4-5 days of progressive shortness of breath and sensations of difficulty taking deep breaths. Attempted to use home inhalers with mild to minimal improvement. 2 days ago, she went to an urgent care who started her on prednisone, azithromycin, and tessalon. As symptoms did not improve, she attempted home albuterol nebulizer without significant relief. Today, she has had increased work to breathe, neck swelling sensations, worsened wheeze, headache, dizziness, and blurry vision.    Additionally, she reports maroon coloured stools sporadically for the past 2-3 weeks, no new onset abd pain, hematemesis, trauma.    Review of Systems:   Review of Systems   Constitutional: Positive for chills, fever and malaise/fatigue. Negative for diaphoresis.   HENT: Positive for nosebleeds. Negative for congestion, hearing loss, sore throat and tinnitus.    Eyes: Positive for blurred vision. Negative for double vision and pain.   Respiratory: Positive for cough, shortness of breath and wheezing. Negative for sputum production.    Cardiovascular: Positive for chest pain, palpitations and orthopnea. Negative for leg swelling.   Gastrointestinal: Positive for " blood in stool, diarrhea and nausea. Negative for abdominal pain, constipation, melena and vomiting.   Genitourinary: Positive for frequency. Negative for dysuria, hematuria and urgency.   Musculoskeletal: Positive for joint pain and myalgias.   Skin: Negative for itching and rash.   Neurological: Positive for dizziness and headaches. Negative for focal weakness and weakness.   Psychiatric/Behavioral: Negative for depression and suicidal ideas. The patient is nervous/anxious.          Past Medical History:   Past Medical History was reviewed with patient.   has a past medical history of Abdominal pain, Anginal syndrome, Apnea, sleep, Arrhythmia, Arthritis, ASTHMA, Atrial fibrillation ([facility]), Back pain, Borderline personality disorder ([facility]), Breath shortness, Bronchitis, Cardiac arrhythmia, Chickenpox, Chronic UTI (9/18/2010), COPD (chronic obstructive pulmonary disease) ([facility]), Cough, Daytime sleepiness, Dental disorder (03/08/2021), Depression, Diabetes ([facility]), Diarrhea, Disorder of thyroid, Fall, Fatigue, Frequent headaches, Gasping for breath, Gynecological disorder, Headache(784.0), Heart burn, Heart murmur, History of falling, Indigestion, Migraine, 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    Past Surgical History: Past Surgical History was reviewed with patient.   has a past surgical history that includes neuro dest facet l/s w/ig sngl (4/21/2015); recovery (1/27/2016); [place] by laparoscopy (8/29/2010); lumbar fusion anterior (8/21/2012); other cardiac surgery (1/2016); tonsillectomy; bowel stimulator  insertion (7/13/2016); gastroscopy with balloon dilatation (N/A, 1/4/2017); muscle biopsy (Right, 1/26/2017); other abdominal surgery; laminotomy; and bowel stimulator insertion (3/10/2021).    Medications: Medications have been reviewed with patient.  Prior to Admission Medications   Prescriptions Last Dose Informant Patient Reported? Taking?   Dulaglutide (TRULICITY) 1.5 MG/0.5ML Solution Pen-injector 4/9/2021 at [id number] Patient No No   Sig: Inject 0.5 mL under the skin every 7 days.   Patient taking differently: Inject 0.5 mL under the skin every Friday.   Melatonin 10 MG Tab 4/15/2021 at PM Patient Yes No   Sig: Take 10 mg by mouth every bedtime.   Mirabegron ER (MYRBETRIQ) 50 MG TABLET SR 24 HR 4/15/2021 at 0400 Patient No No   Sig: Take 50 mg by mouth every day.   Patient taking differently: Take 50 mg by mouth every morning.   OXcarbazepine (TRILEPTAL) 300 MG Tab 4/15/2021 at 0400 Patient No No   Sig: Take 1 tablet by mouth 2 times a day.   albuterol 108 (90 Base) MCG/ACT Aero Soln inhalation aerosol 4/15/2021 at 0400 Patient No No   Sig: Inhale 2 Puffs every 6 hours as needed for Shortness of Breath.   aspirin 81 MG EC tablet 4/15/2021 at 0400 Patient Yes No   Sig: Take 81 mg by mouth every morning.   baclofen (LIORESAL) 10 MG Tab 4/15/2021 at 0400 Patient No No   Sig: Take 1 tablet by mouth 3 times a day.   benzonatate (TESSALON) 100 MG Cap PRN at PRN Patient No No   Sig: Take 1 capsule by mouth 3 times a day as needed for Cough.   busPIRone (BUSPAR) 30 MG tablet 4/15/2021 at 0400 Patient No No   Sig: Take 1 tablet by mouth twice a day   Patient taking differently: Take 10 mg by mouth 3 times a day.   doxycycline (VIBRAMYCIN) 100 MG Tab 4/15/2021 at 0400 Patient No No   Sig: Take 1 tablet by mouth 2 times a day for 7 days.   fluoxetine (PROZAC) 40 MG capsule 4/15/2021 at 0400 Patient No No   Sig: Take 1 capsule by mouth every day.   fluticasone (FLOVENT HFA) 220 MCG/ACT Aerosol Not Taking at Unknown time  Patient No No   Sig: Inhale 1 Puff 2 times a day.   Patient not taking: Reported on 4/15/2021   ipratropium-albuterol (DUONEB) 0.5-2.5 (3) MG/3ML nebulizer solution 4/15/2021 at 0400 Patient Yes No   Sig: Take 3 mL by nebulization every four hours as needed for Shortness of Breath. Nebulizer    ivabradine (CORLANOR) 7.5 MG Tab tablet 4/15/2021 at 0400 Patient No No   Sig: Take 1 tablet by mouth 2 times a day, with meals.   levothyroxine (SYNTHROID) 100 MCG Tab 4/15/2021 at 0400 Patient Yes No   Sig: Take 100 mcg by mouth Every morning on an empty stomach.   metoprolol SR (TOPROL XL) 25 MG TABLET SR 24 HR 4/14/2021 at PM Patient No No   Sig: Take 0.5 Tablets by mouth every evening.   mycophenolate (CELLCEPT) 500 MG tablet 4/15/2021 at 0400 Patient No No   Sig: Take 2 Tablets by mouth 2 times a day.   Patient taking differently: Take 500 mg by mouth 2 times a day.   potassium chloride SA (KDUR) 20 MEQ Tab CR 4/15/2021 at 0400 Patient Yes No   Sig: Take 40 mEq by mouth 2 times a day. 2 tablets = 40 mEq.   predniSONE (DELTASONE) 20 MG Tab 4/15/2021 at 0400 Patient No No   Sig: Take 3 Tablets by mouth every day for 4 days.   pregabalin (LYRICA) 300 MG capsule 4/15/2021 at 0400 Patient No No   Sig: Take 1 capsule by mouth 2 times a day for 90 days.   sodium bicarbonate 325 MG Tab 4/15/2021 at 0400 Patient Yes No   Sig: Take 650 mg by mouth 2 times a day. 2 tabs = 650 mg   topiramate (TOPAMAX) 50 MG tablet 4/15/2021 at 0400 Patient Yes No   Sig: Take 50 mg by mouth 2 times a day.   traZODone (DESYREL) 100 MG Tab 4/14/2021 at PM Patient No No   Sig: Take 1 tablet by mouth every bedtime.   vitamin D, Ergocalciferol, (DRISDOL) 1.25 MG (43490 UT) Cap capsule 4/9/2021 at [id number] Patient Yes No   Sig: Take 50,000 Units by mouth every Friday. In the morning.   ziprasidone (GEODON) 40 MG Cap 4/15/2021 at 0400 Patient No No   Sig: Take 1 tablet by mouth twice a day   Patient taking differently: Take 40 mg by mouth 2 times a day.  "     Facility-Administered Medications: None        Allergies: Allergies have been reviewed with patient.  Allergies   Allergen Reactions   • Depakote [Divalproex Sodium] Unspecified     Muscle spasms/muscle pain and weakness     • Amitriptyline Unspecified     Headaches     • Aripiprazole [Abilify] Unspecified     Headaches/muscle twitching     • Clindamycin Nausea     Even with food     • Flagyl [Metronidazole Hcl] Unspecified     \"eye problems\"     • Flomax [Tamsulosin Hydrochloride] Swelling   • Levaquin Unspecified     Severe muscle cramps in legs  RXN=unknown   • Metformin Unspecified     Increased lactic acid      • Tamsulosin Swelling     Swelling of legs   • Tape Rash     Tears skin off  coban with Tegaderm tape ok intermittently  RXN=ongoing   • Vancomycin Itching     Pt becomes flushed in face and chest.   RXN=7/10/16   • Wound Dressing Adhesive Hives     By pt report   • Ampicillin Rash     Pt reports that she received a rash    • Ciprofloxacin Rash         • Keflex Rash     Pt states she gets a rash with this medication  Tolerates ceftriaxone  Can take with Benadryl   • Levofloxacin Unspecified     Leg muscle cramps   • Metronidazole Rash     \"Vision problems\"   • Penicillins Hives     Can take with Benadryl   • Sulfa Drugs Itching and Myalgia     Muscle pain and weakness   • Valproic Acid Rash     .       Family History:   Reviewed, M: CAD, DM2 ; FGF: CAD; FGM: CAD  family history includes Genitourinary () Problems in her sister; Heart Disease in her maternal grandmother and mother; Hypertension in her maternal grandmother, maternal uncle, and mother; No Known Problems in her sister; Other in her mother and sister; Sleep Apnea in her mother.     Social History:   Tobacco: never   Alcohol: none   Recreational drugs (illegal and prescription):  Edible MJ (medical), never IV use   Employment:   Activity Level: walking, household activities   Living situation:    Recent travel:  None reported  Primary " Care Provider: reviewed Sue Preston M.D.  Other (stressors, spirituality, exposures):  None reported  Physical Exam:   Vitals:  Temp:  [36 °C (96.8 °F)] 36 °C (96.8 °F)  Pulse:  [63-76] 63  Resp:  [22-25] 22  BP: (115)/(58) 115/58  SpO2:  [96 %-99 %] 96 %    Physical Exam  Vitals and nursing note reviewed.   Constitutional:       General: She is in acute distress.      Appearance: She is not ill-appearing or diaphoretic.   HENT:      Head: Normocephalic and atraumatic.      Mouth/Throat:      Mouth: Mucous membranes are moist.      Pharynx: Oropharynx is clear.   Eyes:      Extraocular Movements: Extraocular movements intact.      Pupils: Pupils are equal, round, and reactive to light.   Cardiovascular:      Rate and Rhythm: Normal rate and regular rhythm.      Heart sounds: No murmur. No friction rub. No gallop.    Pulmonary:      Comments: Bilateral bases clear to auscultation; R apex expiratory wheeze, comparatively reduced L apex expiratory wheeze. No accessory muscle use.  Abdominal:      General: There is no distension.      Palpations: Abdomen is soft.      Tenderness: There is no abdominal tenderness. There is no guarding or rebound.   Genitourinary:     Comments: Vaginal/specula exam:  Normal external genitalia structures, redundant vaginal tissue, no erythema, lesions, minimal thin-white discharge without clumping; cervix difficult visualisation without lesion.    Rectal:  External hemorrhoid at 12 position, skin tag at 6 position, no fissures. internal hemorrhoids, no mass or brandon blood. Scant stool in rectal vault  Musculoskeletal:         General: Normal range of motion.      Cervical back: Normal range of motion.      Right lower leg: No edema.      Left lower leg: No edema.   Skin:     General: Skin is warm and dry.   Neurological:      Mental Status: She is alert and oriented to person, place, and time.      Cranial Nerves: No cranial nerve deficit.      Sensory: No sensory deficit.       Motor: No weakness.   Psychiatric:         Mood and Affect: Mood normal.         Behavior: Behavior normal.         Thought Content: Thought content normal.         Judgment: Judgment normal.         Labs:   Lab Results   Component Value Date/Time    SODIUM 136 04/15/2021 05:35 AM    POTASSIUM 3.3 (L) 04/15/2021 05:35 AM    CHLORIDE 104 04/15/2021 05:35 AM    CO2 22 04/15/2021 05:35 AM    GLUCOSE 261 (H) 04/15/2021 05:35 AM    BUN 10 04/15/2021 05:35 AM    CREATININE 0.61 04/15/2021 05:35 AM    CREATININE 0.75 (L) 07/20/2010 11:00 AM    BUNCREATRAT 19 07/20/2010 11:00 AM    GLOMRATE >59 07/20/2010 11:00 AM      Recent Labs     04/13/21  1300 04/15/21  0535   WBC 4.5* 5.0   RBC 3.94* 4.05*   HEMOGLOBIN 11.7* 11.7*   HEMATOCRIT 35.5* 37.8   MCV 90.1 93.3   MCH 29.7 28.9   RDW 45.5 49.1   PLATELETCT 152* 172   MPV 12.3 12.2   NEUTSPOLYS 70.70 63.90   LYMPHOCYTES 19.30* 25.00   MONOCYTES 6.70 8.70   EOSINOPHILS 2.20 0.40   BASOPHILS 0.40 0.40         EKG: Per my read, normal axis, normal sinus rhythm, QTc 483. No ST/T wave changes.     Imaging:   CXR 6am:  1.  Slight hazy left lung base opacity suggests infiltrate  2.  Perihilar interstitial prominence and bronchial wall cuffing, appearance suggests changes of underlying bronchial inflammation, consider bronchitis.    CXR 1pm:  1.  Minimal linear atelectasis or fibrotic change in the left lower lobe.  2.  Otherwise clear chest.    CT abd/pelvis:  1.  There is no acute inflammatory process in the abdomen or pelvis.  2.  There is no bowel obstruction.  3.  There is hepatomegaly with hepatic steatosis.  4.  There is a mildly enlarged spleen, possibly related to body habitus.    Previous Data Review: reviewed    Problem Representation:     Kristin is a 30 y.o. female with complex medical history including asthma (requiring oral steroids 2-3x year, on  Albuterol/fluticasone), COPD (bronchitis predominant, 3L oxygen at baseline), TONYA (oxygen, no CPAP), PCOS, DM2 (trulicity),  psychosis, bladder incontinence; who presented 4/15/2021 with worsened shortness of breath, weakness, generalised pain; admitted for acute asthma exacerbation; found to have lactic acidosis without attributable source.    * Lactic acidosis  Assessment & Plan  Presented in acute asthma exacerbation, was found to have lactic acidosis, with progressive elevation to 6.6. Afebrile, unlikely infectious source. No medications that are linked to lactic acidosis. Pelvic examination without findings consistent with vaginal infection.  -Lactic acid repeat  -Sepsis protocol initiated per Critical care recommendation  -UA/urine culture  -blood cultures  -CT abd/pelvis  -Critical care consultation  -Transfer ICU      Asthma exacerbation  Assessment & Plan  Presented with shortness of breath, wheeze; with stable oxygen saturation. Improved wheeze post duoneb; continued to have respiratory distress, but no use of accessory muscles. ABG showing mild acidosis with normal CO2 and O2.  -RT protocols  -duoneb q4h  -Albuterol inhaler PRN  -symbicort  -solumedrol 125mg IV q6h  -Ceftriaxone 1g IV qD  -doxycycline 100mg PO q8h  -Repeat CXR  -repeat ABG    Hypokalemia  Assessment & Plan  Hypokalemic at 3.3, evolving up to 4.8, likely secondary to acidosis.  -albuterol inhaler  -insulin/dextrose correction  -consider nephrology consultation if K >6.5 despite attempts to control    TONYA (obstructive sleep apnea)  Assessment & Plan  History of TONYA, related to BMI >30. Not using CPAP.  -consider CPAP/BiPAP in ICU  -PCP follow up for sleep study referral    Diabetes mellitus type 2 in obese (HCC)- (present on admission)  Assessment & Plan  History of PCOS, DM2 on trulicity. Last A1c 4.6 4 months prior.  -SSI  -Hold trulicity  -PCP follow up    Atrial fibrillation ([facility])  Assessment & Plan  History of atrial fibrillation, currently on ivabradine, metoprolol.  -Contine home ivabradine 7.5mg PO BID  -continue metoprolol XL 12.5mg PO qD    Blood per  rectum  Assessment & Plan  Reports maroon stools for past 2-3 weeks. External/internal hemorrhoid, without rectal mass, no brandon bleeding, scant stool in rectal vault, guaiac positive. CT abd/pelvis without intraabdominal findings, diverticuli. Hgb, hemodynamically stable. Possible link to lactic acidosis.  -PCP follow up  -Consider GI consultation    Immunocompromised (HCC)- (present on admission)  Assessment & Plan  Taking cellcept for optic neuritis.  -continue cellcept 1000mg PO BID  -PCP/neurology follow up for possible reduced dose/cessation    Hypothyroidism- (present on admission)  Assessment & Plan  History of hypothyroid. TSH 1.2 3 months ago.  -continue synthroid 100mcg PO qD    GERD (gastroesophageal reflux disease)- (present on admission)  Assessment & Plan  History of GERD, does not report any acidic taste, burning sensations of epigastrium/chest. Possible etiology for bloody stools, though asymptomatic at this time.  -continue omeprazole 20mg PO qD

## 2021-04-15 NOTE — DISCHARGE PLANNING
**Information obtained through chart review during the COVID 19 pandemic.  Copied from last admission 2/5/21    Care Transition Team Assessment     Information Source  Orientation : Oriented x 4  Information Given By: Patient  Who is responsible for making decisions for patient? : Patient     Readmission Evaluation  Is this a readmission?: Yes - unplanned readmission  Why do you think you were readmitted?: catherter issue           Interdisciplinary Discharge Planning  Does Admitting Nurse Feel This Could be a Complex Discharge?: No  Primary Care Physician: Sue Preston MD  Lives with - Patient's Self Care Capacity: (Grandmother and Aunt)  Support Systems: Family Member(s)  Housing / Facility: [address]  Do You Take your Prescribed Medications Regularly: Yes([facility])  Able to Return to Previous ADL's: Yes  Mobility Issues: Yes  Prior Services: Skilled Home Health Services  Patient Prefers to be Discharged to:: Home  Assistance Needed: No(pt already has Penny BECERRA on service)  Durable Medical Equipment: Walker, Commode(slide board, hospital bed, WC, shower bench)  DME Provider / Phone: Penny BECERRA([phone])     Discharge Preparedness  What are your discharge supports?: Grandparent(Aunt)  Prior Functional Level: Needs Assist with Activities of Daily Living, Needs Assist with Medication Management, Uses Walker, Uses Wheelchair(pt states she almost bed bound)  Difficulity with ADLs: Bathing, Dressing, Toileting, Walking  Difficulty with ADLs Comment: (grandmother helps pt will all her needs)  Difficulity with IADLs: Cooking, Driving, Laundry, Managing medication, Shopping(grandmother helps pt with all her needs)     Functional Assesment  Prior Functional Level: Needs Assist with Activities of Daily Living, Needs Assist with Medication Management, Uses Walker, Uses Wheelchair(pt states she almost bed bound)     Finances  Financial Barriers to Discharge: Yes  Average Monthly Income: (pt unaware her monthly  income)  Source of Income: Social Security Disability  Prescription Coverage: Yes     Vision / Hearing Impairment  Vision Impairment : (wears glasses)  Hearing Impairment : No     Values / Beliefs / Concerns  Values / Beliefs Concerns : No     Advance Directive  Advance Directive?: POLST     Domestic Abuse  Have you ever been the victim of abuse or violence?: No  Physical Abuse or Sexual Abuse: No     Psychological Assessment  History of Substance Abuse: None  History of Psychiatric Problems: (pt states she has depression and anxiety)     Discharge Risks or Barriers  Discharge risks or barriers?: Complex medical needs(depends on grandmother for most of her ADL's)  Patient risk factors: Cognitive / sensory / physical deficit, Complex medical needs, Readmission     Anticipated Discharge Information  Discharge Disposition: Still a Patient (29)  Discharge Address: Sue Preston MD  Discharge Contact Phone Number: (pt states one of her family members will be available )

## 2021-04-16 ENCOUNTER — APPOINTMENT (OUTPATIENT)
Dept: RADIOLOGY | Facility: MEDICAL CENTER | Age: 31
DRG: 202 | End: 2021-04-16
Attending: STUDENT IN AN ORGANIZED HEALTH CARE EDUCATION/TRAINING PROGRAM
Payer: MEDICARE

## 2021-04-16 ENCOUNTER — APPOINTMENT (OUTPATIENT)
Dept: RADIOLOGY | Facility: MEDICAL CENTER | Age: 31
DRG: 202 | End: 2021-04-16
Attending: INTERNAL MEDICINE
Payer: MEDICARE

## 2021-04-16 ENCOUNTER — PATIENT OUTREACH (OUTPATIENT)
Dept: HEALTH INFORMATION MANAGEMENT | Facility: OTHER | Age: 31
End: 2021-04-16

## 2021-04-16 LAB
ANION GAP SERPL CALC-SCNC: 15 MMOL/L (ref 7–16)
BASOPHILS # BLD AUTO: 0.1 % (ref 0–1.8)
BASOPHILS # BLD: 0.01 K/UL (ref 0–0.12)
BUN SERPL-MCNC: 7 MG/DL (ref 8–22)
CALCIUM SERPL-MCNC: 9 MG/DL (ref 8.5–10.5)
CHLORIDE SERPL-SCNC: 105 MMOL/L (ref 96–112)
CO2 SERPL-SCNC: 18 MMOL/L (ref 20–33)
CREAT SERPL-MCNC: 0.53 MG/DL (ref 0.5–1.4)
EKG IMPRESSION: NORMAL
EOSINOPHIL # BLD AUTO: 0 K/UL (ref 0–0.51)
EOSINOPHIL NFR BLD: 0 % (ref 0–6.9)
ERYTHROCYTE [DISTWIDTH] IN BLOOD BY AUTOMATED COUNT: 47.1 FL (ref 35.9–50)
GLUCOSE BLD-MCNC: 137 MG/DL (ref 65–99)
GLUCOSE BLD-MCNC: 141 MG/DL (ref 65–99)
GLUCOSE BLD-MCNC: 157 MG/DL (ref 65–99)
GLUCOSE BLD-MCNC: 164 MG/DL (ref 65–99)
GLUCOSE SERPL-MCNC: 170 MG/DL (ref 65–99)
HCT VFR BLD AUTO: 32.8 % (ref 37–47)
HGB BLD-MCNC: 10.7 G/DL (ref 12–16)
IMM GRANULOCYTES # BLD AUTO: 0.07 K/UL (ref 0–0.11)
IMM GRANULOCYTES NFR BLD AUTO: 0.8 % (ref 0–0.9)
LACTATE BLD-SCNC: 2.2 MMOL/L (ref 0.5–2)
LACTATE BLD-SCNC: 3.6 MMOL/L (ref 0.5–2)
LACTATE BLD-SCNC: 4 MMOL/L (ref 0.5–2)
LACTATE BLD-SCNC: 4.2 MMOL/L (ref 0.5–2)
LYMPHOCYTES # BLD AUTO: 0.6 K/UL (ref 1–4.8)
LYMPHOCYTES NFR BLD: 6.6 % (ref 22–41)
MCH RBC QN AUTO: 29.6 PG (ref 27–33)
MCHC RBC AUTO-ENTMCNC: 32.6 G/DL (ref 33.6–35)
MCV RBC AUTO: 90.9 FL (ref 81.4–97.8)
MONOCYTES # BLD AUTO: 0.23 K/UL (ref 0–0.85)
MONOCYTES NFR BLD AUTO: 2.5 % (ref 0–13.4)
NEUTROPHILS # BLD AUTO: 8.23 K/UL (ref 2–7.15)
NEUTROPHILS NFR BLD: 90 % (ref 44–72)
NRBC # BLD AUTO: 0 K/UL
NRBC BLD-RTO: 0 /100 WBC
PLATELET # BLD AUTO: 195 K/UL (ref 164–446)
PMV BLD AUTO: 11.7 FL (ref 9–12.9)
POTASSIUM SERPL-SCNC: 4.4 MMOL/L (ref 3.6–5.5)
RBC # BLD AUTO: 3.61 M/UL (ref 4.2–5.4)
SODIUM SERPL-SCNC: 138 MMOL/L (ref 135–145)
TROPONIN T SERPL-MCNC: 6 NG/L (ref 6–19)
TROPONIN T SERPL-MCNC: 7 NG/L (ref 6–19)
WBC # BLD AUTO: 9.1 K/UL (ref 4.8–10.8)

## 2021-04-16 PROCEDURE — 94640 AIRWAY INHALATION TREATMENT: CPT

## 2021-04-16 PROCEDURE — 700102 HCHG RX REV CODE 250 W/ 637 OVERRIDE(OP): Performed by: STUDENT IN AN ORGANIZED HEALTH CARE EDUCATION/TRAINING PROGRAM

## 2021-04-16 PROCEDURE — 700101 HCHG RX REV CODE 250: Performed by: STUDENT IN AN ORGANIZED HEALTH CARE EDUCATION/TRAINING PROGRAM

## 2021-04-16 PROCEDURE — 99291 CRITICAL CARE FIRST HOUR: CPT | Performed by: INTERNAL MEDICINE

## 2021-04-16 PROCEDURE — 94010 BREATHING CAPACITY TEST: CPT

## 2021-04-16 PROCEDURE — 700111 HCHG RX REV CODE 636 W/ 250 OVERRIDE (IP): Performed by: EMERGENCY MEDICINE

## 2021-04-16 PROCEDURE — 85025 COMPLETE CBC W/AUTO DIFF WBC: CPT

## 2021-04-16 PROCEDURE — 96366 THER/PROPH/DIAG IV INF ADDON: CPT

## 2021-04-16 PROCEDURE — 93005 ELECTROCARDIOGRAM TRACING: CPT | Performed by: INTERNAL MEDICINE

## 2021-04-16 PROCEDURE — 700111 HCHG RX REV CODE 636 W/ 250 OVERRIDE (IP): Performed by: INTERNAL MEDICINE

## 2021-04-16 PROCEDURE — 93010 ELECTROCARDIOGRAM REPORT: CPT | Performed by: INTERNAL MEDICINE

## 2021-04-16 PROCEDURE — 83605 ASSAY OF LACTIC ACID: CPT | Mod: 91

## 2021-04-16 PROCEDURE — 99223 1ST HOSP IP/OBS HIGH 75: CPT | Performed by: INTERNAL MEDICINE

## 2021-04-16 PROCEDURE — 700111 HCHG RX REV CODE 636 W/ 250 OVERRIDE (IP): Performed by: STUDENT IN AN ORGANIZED HEALTH CARE EDUCATION/TRAINING PROGRAM

## 2021-04-16 PROCEDURE — 96372 THER/PROPH/DIAG INJ SC/IM: CPT

## 2021-04-16 PROCEDURE — 96376 TX/PRO/DX INJ SAME DRUG ADON: CPT

## 2021-04-16 PROCEDURE — 700102 HCHG RX REV CODE 250 W/ 637 OVERRIDE(OP): Performed by: INTERNAL MEDICINE

## 2021-04-16 PROCEDURE — 05HB33Z INSERTION OF INFUSION DEVICE INTO RIGHT BASILIC VEIN, PERCUTANEOUS APPROACH: ICD-10-PCS | Performed by: INTERNAL MEDICINE

## 2021-04-16 PROCEDURE — 84484 ASSAY OF TROPONIN QUANT: CPT | Mod: 91

## 2021-04-16 PROCEDURE — A9270 NON-COVERED ITEM OR SERVICE: HCPCS | Performed by: INTERNAL MEDICINE

## 2021-04-16 PROCEDURE — 700105 HCHG RX REV CODE 258: Performed by: INTERNAL MEDICINE

## 2021-04-16 PROCEDURE — 96375 TX/PRO/DX INJ NEW DRUG ADDON: CPT

## 2021-04-16 PROCEDURE — 82962 GLUCOSE BLOOD TEST: CPT

## 2021-04-16 PROCEDURE — 770022 HCHG ROOM/CARE - ICU (200)

## 2021-04-16 PROCEDURE — A9270 NON-COVERED ITEM OR SERVICE: HCPCS | Performed by: STUDENT IN AN ORGANIZED HEALTH CARE EDUCATION/TRAINING PROGRAM

## 2021-04-16 PROCEDURE — C1751 CATH, INF, PER/CENT/MIDLINE: HCPCS

## 2021-04-16 RX ORDER — KETOROLAC TROMETHAMINE 30 MG/ML
30 INJECTION, SOLUTION INTRAMUSCULAR; INTRAVENOUS EVERY 6 HOURS PRN
Status: DISCONTINUED | OUTPATIENT
Start: 2021-04-16 | End: 2021-04-16

## 2021-04-16 RX ORDER — OXYCODONE HYDROCHLORIDE 5 MG/1
5 TABLET ORAL EVERY 6 HOURS PRN
Status: DISCONTINUED | OUTPATIENT
Start: 2021-04-16 | End: 2021-04-19 | Stop reason: HOSPADM

## 2021-04-16 RX ORDER — MYCOPHENOLATE MOFETIL 250 MG/1
500 CAPSULE ORAL 2 TIMES DAILY
Status: DISCONTINUED | OUTPATIENT
Start: 2021-04-16 | End: 2021-04-19 | Stop reason: HOSPADM

## 2021-04-16 RX ORDER — METHYLPREDNISOLONE SODIUM SUCCINATE 125 MG/2ML
62.5 INJECTION, POWDER, LYOPHILIZED, FOR SOLUTION INTRAMUSCULAR; INTRAVENOUS EVERY 12 HOURS
Status: DISCONTINUED | OUTPATIENT
Start: 2021-04-16 | End: 2021-04-18

## 2021-04-16 RX ORDER — KETOROLAC TROMETHAMINE 30 MG/ML
30 INJECTION, SOLUTION INTRAMUSCULAR; INTRAVENOUS EVERY 6 HOURS PRN
Status: DISCONTINUED | OUTPATIENT
Start: 2021-04-16 | End: 2021-04-18

## 2021-04-16 RX ADMIN — PREGABALIN 300 MG: 150 CAPSULE ORAL at 09:39

## 2021-04-16 RX ADMIN — ENOXAPARIN SODIUM 40 MG: 40 INJECTION SUBCUTANEOUS at 19:36

## 2021-04-16 RX ADMIN — METOPROLOL SUCCINATE 12.5 MG: 25 TABLET, EXTENDED RELEASE ORAL at 17:22

## 2021-04-16 RX ADMIN — BACLOFEN 10 MG: 10 TABLET ORAL at 11:23

## 2021-04-16 RX ADMIN — TOPIRAMATE 50 MG: 100 TABLET, FILM COATED ORAL at 17:22

## 2021-04-16 RX ADMIN — ACETAMINOPHEN 1000 MG: 500 TABLET, FILM COATED ORAL at 05:36

## 2021-04-16 RX ADMIN — IVABRADINE 7.5 MG: 7.5 TABLET, FILM COATED ORAL at 08:30

## 2021-04-16 RX ADMIN — ALBUTEROL SULFATE 2 PUFF: 90 AEROSOL, METERED RESPIRATORY (INHALATION) at 05:14

## 2021-04-16 RX ADMIN — BUSPIRONE HYDROCHLORIDE 10 MG: 10 TABLET ORAL at 11:23

## 2021-04-16 RX ADMIN — Medication 81 MG: at 05:06

## 2021-04-16 RX ADMIN — IPRATROPIUM BROMIDE AND ALBUTEROL SULFATE 3 ML: .5; 2.5 SOLUTION RESPIRATORY (INHALATION) at 07:38

## 2021-04-16 RX ADMIN — KETOROLAC TROMETHAMINE 30 MG: 30 INJECTION, SOLUTION INTRAMUSCULAR; INTRAVENOUS at 05:36

## 2021-04-16 RX ADMIN — ACETAMINOPHEN 1000 MG: 500 TABLET, FILM COATED ORAL at 14:39

## 2021-04-16 RX ADMIN — INSULIN LISPRO 1 UNITS: 100 INJECTION, SOLUTION INTRAVENOUS; SUBCUTANEOUS at 16:52

## 2021-04-16 RX ADMIN — ZIPRASIDONE HYDROCHLORIDE 40 MG: 40 CAPSULE ORAL at 04:49

## 2021-04-16 RX ADMIN — METHYLPREDNISOLONE SODIUM SUCCINATE 62.5 MG: 125 INJECTION, POWDER, FOR SOLUTION INTRAMUSCULAR; INTRAVENOUS at 17:23

## 2021-04-16 RX ADMIN — OXCARBAZEPINE 300 MG: 300 TABLET, FILM COATED ORAL at 19:34

## 2021-04-16 RX ADMIN — MYCOPHENOLATE MOFETIL 1000 MG: 250 CAPSULE ORAL at 04:49

## 2021-04-16 RX ADMIN — ENOXAPARIN SODIUM 40 MG: 40 INJECTION SUBCUTANEOUS at 09:39

## 2021-04-16 RX ADMIN — INSULIN LISPRO 1 UNITS: 100 INJECTION, SOLUTION INTRAVENOUS; SUBCUTANEOUS at 11:27

## 2021-04-16 RX ADMIN — BACLOFEN 10 MG: 10 TABLET ORAL at 17:22

## 2021-04-16 RX ADMIN — BACLOFEN 10 MG: 10 TABLET ORAL at 04:46

## 2021-04-16 RX ADMIN — BUDESONIDE AND FORMOTEROL FUMARATE DIHYDRATE 2 PUFF: 80; 4.5 AEROSOL RESPIRATORY (INHALATION) at 07:38

## 2021-04-16 RX ADMIN — OXYBUTYNIN CHLORIDE 5 MG: 5 TABLET ORAL at 19:35

## 2021-04-16 RX ADMIN — OXYBUTYNIN CHLORIDE 5 MG: 5 TABLET ORAL at 04:47

## 2021-04-16 RX ADMIN — DOCUSATE SODIUM 50 MG AND SENNOSIDES 8.6 MG 2 TABLET: 8.6; 5 TABLET, FILM COATED ORAL at 17:23

## 2021-04-16 RX ADMIN — FLUOXETINE 40 MG: 20 CAPSULE ORAL at 05:06

## 2021-04-16 RX ADMIN — IPRATROPIUM BROMIDE AND ALBUTEROL SULFATE 3 ML: .5; 2.5 SOLUTION RESPIRATORY (INHALATION) at 11:17

## 2021-04-16 RX ADMIN — MONTELUKAST 10 MG: 10 TABLET, FILM COATED ORAL at 21:36

## 2021-04-16 RX ADMIN — ONDANSETRON 4 MG: 4 TABLET, ORALLY DISINTEGRATING ORAL at 16:09

## 2021-04-16 RX ADMIN — MEROPENEM 500 MG: 500 INJECTION, POWDER, FOR SOLUTION INTRAVENOUS at 23:58

## 2021-04-16 RX ADMIN — MEROPENEM 500 MG: 500 INJECTION, POWDER, FOR SOLUTION INTRAVENOUS at 17:24

## 2021-04-16 RX ADMIN — IVABRADINE 7.5 MG: 7.5 TABLET, FILM COATED ORAL at 19:35

## 2021-04-16 RX ADMIN — TOPIRAMATE 50 MG: 100 TABLET, FILM COATED ORAL at 04:47

## 2021-04-16 RX ADMIN — MEROPENEM 500 MG: 500 INJECTION, POWDER, FOR SOLUTION INTRAVENOUS at 04:48

## 2021-04-16 RX ADMIN — BUDESONIDE AND FORMOTEROL FUMARATE DIHYDRATE 2 PUFF: 80; 4.5 AEROSOL RESPIRATORY (INHALATION) at 21:16

## 2021-04-16 RX ADMIN — OMEPRAZOLE 20 MG: 20 CAPSULE, DELAYED RELEASE ORAL at 05:06

## 2021-04-16 RX ADMIN — TRAZODONE HYDROCHLORIDE 100 MG: 100 TABLET ORAL at 21:35

## 2021-04-16 RX ADMIN — METHYLPREDNISOLONE SODIUM SUCCINATE 62.5 MG: 125 INJECTION, POWDER, FOR SOLUTION INTRAMUSCULAR; INTRAVENOUS at 04:47

## 2021-04-16 RX ADMIN — OXYCODONE HYDROCHLORIDE 5 MG: 5 TABLET ORAL at 09:41

## 2021-04-16 RX ADMIN — PREGABALIN 300 MG: 150 CAPSULE ORAL at 21:36

## 2021-04-16 RX ADMIN — OXYCODONE HYDROCHLORIDE 5 MG: 5 TABLET ORAL at 16:04

## 2021-04-16 RX ADMIN — OXCARBAZEPINE 300 MG: 300 TABLET, FILM COATED ORAL at 04:48

## 2021-04-16 RX ADMIN — BUSPIRONE HYDROCHLORIDE 10 MG: 10 TABLET ORAL at 17:23

## 2021-04-16 RX ADMIN — MEROPENEM 500 MG: 500 INJECTION, POWDER, FOR SOLUTION INTRAVENOUS at 11:23

## 2021-04-16 RX ADMIN — BUSPIRONE HYDROCHLORIDE 10 MG: 10 TABLET ORAL at 04:46

## 2021-04-16 RX ADMIN — LEVOTHYROXINE SODIUM 100 MCG: 0.1 TABLET ORAL at 05:06

## 2021-04-16 RX ADMIN — IPRATROPIUM BROMIDE AND ALBUTEROL SULFATE 3 ML: .5; 2.5 SOLUTION RESPIRATORY (INHALATION) at 15:16

## 2021-04-16 RX ADMIN — MYCOPHENOLATE MOFETIL 500 MG: 250 CAPSULE ORAL at 17:22

## 2021-04-16 RX ADMIN — ZIPRASIDONE HYDROCHLORIDE 40 MG: 40 CAPSULE ORAL at 19:35

## 2021-04-16 SDOH — ECONOMIC STABILITY: TRANSPORTATION INSECURITY
IN THE PAST 12 MONTHS, HAS LACK OF TRANSPORTATION KEPT YOU FROM MEETINGS, WORK, OR FROM GETTING THINGS NEEDED FOR DAILY LIVING?: YES

## 2021-04-16 SDOH — ECONOMIC STABILITY: FOOD INSECURITY: WITHIN THE PAST 12 MONTHS, YOU WORRIED THAT YOUR FOOD WOULD RUN OUT BEFORE YOU GOT MONEY TO BUY MORE.: OFTEN TRUE

## 2021-04-16 SDOH — ECONOMIC STABILITY: FOOD INSECURITY: WITHIN THE PAST 12 MONTHS, THE FOOD YOU BOUGHT JUST DIDN'T LAST AND YOU DIDN'T HAVE MONEY TO GET MORE.: SOMETIMES TRUE

## 2021-04-16 ASSESSMENT — PAIN DESCRIPTION - PAIN TYPE
TYPE: CHRONIC PAIN
TYPE: ACUTE PAIN;CHRONIC PAIN
TYPE: CHRONIC PAIN
TYPE: CHRONIC PAIN;ACUTE PAIN
TYPE: CHRONIC PAIN

## 2021-04-16 ASSESSMENT — ENCOUNTER SYMPTOMS
SHORTNESS OF BREATH: 1
DIZZINESS: 0
COUGH: 1
CHILLS: 0
BLURRED VISION: 0
MYALGIAS: 0
FEVER: 0
VOMITING: 1
ABDOMINAL PAIN: 1
SPUTUM PRODUCTION: 1
FOCAL WEAKNESS: 0
BLOOD IN STOOL: 1
NAUSEA: 1
SENSORY CHANGE: 0
STRIDOR: 0
DIARRHEA: 1

## 2021-04-16 ASSESSMENT — PATIENT HEALTH QUESTIONNAIRE - PHQ9
2. FEELING DOWN, DEPRESSED, IRRITABLE, OR HOPELESS: NOT AT ALL
1. LITTLE INTEREST OR PLEASURE IN DOING THINGS: NOT AT ALL
1. LITTLE INTEREST OR PLEASURE IN DOING THINGS: NOT AT ALL
SUM OF ALL RESPONSES TO PHQ9 QUESTIONS 1 AND 2: 0
2. FEELING DOWN, DEPRESSED, IRRITABLE, OR HOPELESS: NOT AT ALL
SUM OF ALL RESPONSES TO PHQ9 QUESTIONS 1 AND 2: 0

## 2021-04-16 ASSESSMENT — SOCIAL DETERMINANTS OF HEALTH (SDOH): HOW HARD IS IT FOR YOU TO PAY FOR THE VERY BASICS LIKE FOOD, HOUSING, MEDICAL CARE, AND HEATING?: HARD

## 2021-04-16 ASSESSMENT — PULMONARY FUNCTION TESTS: PEFR: 300

## 2021-04-16 NOTE — PROGRESS NOTES
Patient transferred to cardiac ICU via transport monitor, accompanied by Amberly HASSAN and transporter. Vital signs stable during transfer. Bedside report given to Floridalma HASSAN.

## 2021-04-16 NOTE — DISCHARGE PLANNING
Care Transition Team Discharge Planning    Anticipated Discharge Disposition: TBD    Action: Per AM rounds, pt transferred from CDU to CIC. Having increased sob for 3-5 days, on ABX, having abdominal pain d/t ovarian cyst, up in chair this AM     Barriers to Discharge: medical clearance/stability    Plan: Lsw will continue to follow, attend rounds for medical updates, and assist w/ d/c planning.

## 2021-04-16 NOTE — HOSPITAL COURSE
"\"30 y.o. female with long complex history despite her young age including asthma which she self treats with home nebulizer and has been on antibiotics and prednisone the last few days who also has too numerous to count allergies, see the list who presented 4/15/2021 with shortness of breath and was initially seen on 4/13.  She was given additional breathing treatments and started on IV Solu-Medrol and IV antibiotics.  Chest x-ray did not reveal pneumonic process.  She had some nausea without vomiting and she had diarrhea 5 times a day for a week with some blood at times.  She is got some right lower quadrant pain has a history of known 3.5 cm ovarian cyst.  Her last CT abdomen a few months ago did not reveal any major issues but she had IUD at that time.  Patient is not sure if she has had it removed.  Pregnancy test was negative.  She denied UTI and URI symptoms.  She denies any sexual activity and she is not having any vaginal discharge.  She does have some chest discomfort with breathing at times.  She has not been eating or sleeping well the last few days.  When her asthma flares she always has that same problem.  She is initially mated to the CDU with a lactic acid of 3.5 earlier today which is trended up to 5.8 and 6.6 and I received a call for transfer to the ICU.  Work of breathing has not been a problem.  And she is tired not somnolent and easily aroused.  Blood gases show worsening metabolic acidosis.  Oxygenation is normal.  An echocardiogram done on 3/6/2021 and it was grossly normal including no right-sided abnormalities.  She was started on doxycycline and ceftriaxone.  She is allergic to the majority of antibiotics I would give to cover the abdomen and ID is consulted emergently for assistance, the resident spoke with Dr. Wild.\"  "

## 2021-04-16 NOTE — ASSESSMENT & PLAN NOTE
History of atrial fibrillation, currently on ivabradine, metoprolol.  -Contine home ivabradine 7.5mg PO BID  -continue metoprolol XL 12.5mg PO qD

## 2021-04-16 NOTE — PROCEDURES
Vascular Access Team    Date of Insertion: 4/16/21  Arm Circumference: 40  Internal length: 16  External Length: 0  Vein Occupancy %: 38  Reason for Midline: Access  Labs: WBC 9.1, , BUN 7, Cr 0.53, GFR >60, INR 1.00    Orders confirmed, vessel patency confirmed with ultrasound. Risks and benefits of procedure explained to patient and education regarding line associated bloodstream infections provided. Questions answered.     Power Midline placed in RUE per licensed provider order with ultrasound guidance. 4 Fr, Single lumen Power Midline placed in Bacilic vein after 1 attempt(s). 2 mL of 1% lidocaine injected intradermally, 21 gauge microintroducer needle and modified Seldinger technique used. 16 cm catheter inserted with good blood return. Secured at 0 cm marker. Each lumen flushed without resistance with 10 mL 0.9% normal saline. Midline secured with Biopatch and Tegaderm.     Midline placement is confirmed by nurse using ultrasound and ability to flush and draw blood. Midline is appropriate for use at this time.  Patient tolerated procedure well, without complications.  No X-ray is needed for placement confirmation.  Patient condition relayed to unit RN or ordering physician via this post procedure note in the EMR.     Ultrasound images uploaded to PACS and viewable in the EMR - yes  Ultrasound imaged printed and placed in paper chart - no     BARD Power Midline ref # [id number], Lot # [id number], Expiration Date 2/28/22

## 2021-04-16 NOTE — CARE PLAN
Problem: Bronchoconstriction:  Goal: Improve in air movement and diminished wheezing  Outcome: PROGRESSING AS EXPECTED   Duoneb Q4, Symbicort BID. Patient receiving 4 LPM.

## 2021-04-16 NOTE — CONSULTS
INFECTIOUS DISEASE CONSULTATION     REASON FOR CONSULTATION:  Concern for ESBL infection.  Dyspnea and abd pain    CONSULTING MD: John Brown MD     HISTORY OF PRESENT ILLNESS:  This is a 30-year-old female who is on chronic   immunosuppression due to optic neuritis with CellCept, and low-dose steroids,   who has longstanding asthma who was in her usual state of health until several   days prior to admission, she started developing increasing shortness of   breath.  She was seen on 04/13.  She was given additional breathing   treatments, given IV Solu-Medrol and IV antibiotics.  Chest x-ray did not show   pneumonia.  She endorses nausea, but no vomiting.  Prior to getting here, she   had some diarrhea with melena.  She states she has had with her shortness of   breath bilateral upper chest pain as well as right-sided flank pain.  She had   a CT scan several months ago, which was unrevealing.  She had repeat CT scan   this admission, which did not show any intraabdominal pathology.  She was   noted to have a lactic acidosis and was admitted to the cardiac ICU for more   aggressive treatment of her asthma.  She was started initially on ceftriaxone and  Doxycycline.  Because of initial concern for intra-abdominal pathology, she was   switched to meropenem.  Infectious disease consulted for antibiotic   recommendations and management.     ALLERGIES:  SHE HAS EXTENSIVE ALLERGY LIST OF 20 DIFFERENT ALLERGIES ____   ANTIBIOTICS, MOST ARE DRUG INTOLERANCES SUCH AS NAUSEA, ITCHING.  SHE DOES   STATE RASH WITH CIPROFLOXACIN, KEFLEX, PENICILLIN.  OTHER ALLERGIES AND DRUG   INTOLERANCES ARE PER THE MEDICAL RECORD.     PAST MEDICAL HISTORY:  Asthma, atrial fibrillation, borderline personality   disorder, psychiatric disorder, diabetes, sleep apnea, transverse myelitis,   optic neuritis, LTBI.     FAMILY HISTORY:  Heart disease, hypertension.     SOCIAL HISTORY:  She is a nonsmoker.  She has used marijuana.  Does not  drink   alcohol     MEDICATIONS:  She has an extensive medication list, please see admission H and   P.     PHYSICAL EXAMINATION:    GENERAL:  She is well-nourished, well-developed female in no acute distress,   pleasant, cooperative.  She has been afebrile since admission.  Her voice is   hoarse.  VITAL SIGNS:  Current temperature is 97.4, blood pressure 99/37, pulse 74,   respiratory rate of 20, oxygen saturation 92-96% on 2 liters nasal cannula.    She weighs 113 kilos, she is 5 feet 5 inches, BMI of 41.27.  HEENT:  Normocephalic, atraumatic.  Pupils equal, round, react to light.    Extraocular movements intact.  Oropharynx clear.  NECK:  Supple.  There is no JVD or stridor.  CARDIOVASCULAR:  Regular rate and rhythm.  CHEST:  Grossly clear to auscultation bilaterally.  It is unlabored.  She does   have coarse breath sounds, currently no wheezing.  ABDOMEN:  Obese, soft, nontender, nondistended.  There is no rebound or   guarding.  There is no overlying ecchymoses or wounds noted.  EXTREMITIES:  Show no cyanosis or clubbing.  SKIN:  Reveals no rashes.  NEUROLOGIC:  She is awake, alert and oriented.  Speech is fluent without   dysarthria.  Cranial nerves are intact.     LABORATORY DATA:  Current labs show white blood cell count of 9.1, H and H of   10.7 and 32.8, platelets 195.  Hemoglobin and hematocrit are decreased from   her baseline of 13.3 and 41.6. Sodium 138, potassium 4.4, chloride 105,   bicarbonate 18, glucose 170, BUN 7, creatinine 0.53.  Lactic acid is down to   3.6.  Urinalysis was negative except for glucose.  COVID was negative.    Procalcitonin was negative.  Urine culture is negative.  Blood cultures are   negative at 24 hours.     DIAGNOSTIC DATA:  CT scan of the abdomen and pelvis showed surgically absent   gallbladder, normal adrenal, kidneys, aorta.  No lymphadenopathy.  There was   moderate right-sided colonic stool, but no obstruction.  Spleen was mildly   enlarged. Hepatic steatosis was  noted.  Chest x-ray showed no pneumonia.  EKG   showed QTc of 43.     ASSESSMENT AND PLAN:  A 30-year-old female who was admitted with asthma   exacerbation, likely due to a URI given her hoarseness on exam, currently no   bacterial infection is seen.  Her procalcitonin was normal.  She has no   leukocytosis; however, she is immunocompromised due to her optic neuritis,   transverse myelitis and need for chronic CellCept, and low-dose steroids.  She   is currently on broad spectrum antibiotics due to her history of ESBL UTI,   history of urinary retention.  Her current UA is negative as well as CT scan   of the abdomen and pelvis, so anticipate discontinuation of the meropenem at   48 hours if her blood cultures remain negative.  Continue with aggressive   treatment of her asthma exacerbation.  We are continuing to attempt to control   blood sugars.  Her glucose is down to 137 today. She does have a history of   borderline personality disorder as well as schizoaffective disorder.  She   should continue on her current medications.  Further recommendations per   clinical course.     Thank you and we will follow with you.        ______________________________  MD DELFIN GARRETT/BIRD/TIERRA    DD:  04/16/2021 11:53  DT:  04/16/2021 14:39    Job#:  [id number]

## 2021-04-16 NOTE — ASSESSMENT & PLAN NOTE
Patient is significantly improved, continue respiratory protocol, inhaled steroids, bronchodilators

## 2021-04-16 NOTE — PROGRESS NOTES
"Critical Care Progress Note    Date of admission  4/15/2021    Chief Complaint  30 y.o. female admitted 4/15/2021 with asthma exacerbation    Hospital Course  \"30 y.o. female with long complex history despite her young age including asthma which she self treats with home nebulizer and has been on antibiotics and prednisone the last few days who also has too numerous to count allergies, see the list who presented 4/15/2021 with shortness of breath and was initially seen on 4/13.  She was given additional breathing treatments and started on IV Solu-Medrol and IV antibiotics.  Chest x-ray did not reveal pneumonic process.  She had some nausea without vomiting and she had diarrhea 5 times a day for a week with some blood at times.  She is got some right lower quadrant pain has a history of known 3.5 cm ovarian cyst.  Her last CT abdomen a few months ago did not reveal any major issues but she had IUD at that time.  Patient is not sure if she has had it removed.  Pregnancy test was negative.  She denied UTI and URI symptoms.  She denies any sexual activity and she is not having any vaginal discharge.  She does have some chest discomfort with breathing at times.  She has not been eating or sleeping well the last few days.  When her asthma flares she always has that same problem.  She is initially mated to the CDU with a lactic acid of 3.5 earlier today which is trended up to 5.8 and 6.6 and I received a call for transfer to the ICU.  Work of breathing has not been a problem.  And she is tired not somnolent and easily aroused.  Blood gases show worsening metabolic acidosis.  Oxygenation is normal.  An echocardiogram done on 3/6/2021 and it was grossly normal including no right-sided abnormalities.  She was started on doxycycline and ceftriaxone.  She is allergic to the majority of antibiotics I would give to cover the abdomen and ID is consulted emergently for assistance, the resident spoke with  " "Junito.\"      Interval Problem Update  Reviewed last 24 hour events:   -Transferred to the ICU overnight for elevated lactate and concerns for sepsis.  This morning she primarily complains of chest pain which has been ongoing for 3 to 5 minutes.  This is consistent with her prior episodes of angina.   - Neuro: AOx4   - HR: 70s-80s   - SBP: 90s-140s   - GI: Advance diet as tolerated   - UOP: 1.6L since admit. Multiple incontinent episodes   - Andrade: no, purewick   - Tm: 37   - Lines: PIVs   - PPx: GI PPI, DVT lovenox   - 2L NC   - CXR (personally reviewed and compared to prior): no new   - on merrem   - Duo, symbicort    Review of Systems  Review of Systems   Constitutional: Positive for malaise/fatigue. Negative for chills and fever.   HENT: Positive for congestion.    Eyes: Negative for blurred vision.   Respiratory: Positive for cough, sputum production and shortness of breath. Negative for stridor.    Cardiovascular: Positive for chest pain.   Gastrointestinal: Positive for abdominal pain, blood in stool (now resolved), diarrhea, nausea and vomiting (now resolved).   Genitourinary: Negative for dysuria.   Musculoskeletal: Negative for myalgias.   Skin: Negative for rash.   Neurological: Negative for dizziness, sensory change and focal weakness.        Vital Signs for last 24 hours   Temp:  [35.8 °C (96.5 °F)-37 °C (98.6 °F)] 36.3 °C (97.4 °F)  Pulse:  [73-86] 74  Resp:  [13-46] 20  BP: ()/(37-70) 99/37  SpO2:  [90 %-99 %] 96 %    Hemodynamic parameters for last 24 hours       Respiratory Information for the last 24 hours       Physical Exam   Physical Exam  Vitals and nursing note reviewed.   Constitutional:       Appearance: She is morbidly obese. She is ill-appearing. She is not toxic-appearing.      Interventions: Nasal cannula in place.   HENT:      Head: Normocephalic and atraumatic.      Right Ear: External ear normal.      Left Ear: External ear normal.      Nose: Nose normal.      Mouth/Throat:    "   Mouth: Mucous membranes are dry.   Eyes:      General: No scleral icterus.     Extraocular Movements: Extraocular movements intact.      Pupils: Pupils are equal, round, and reactive to light.   Neck:      Vascular: No JVD.      Trachea: Phonation normal.   Cardiovascular:      Rate and Rhythm: Normal rate and regular rhythm.  No extrasystoles are present.     Heart sounds: No murmur. No gallop.    Pulmonary:      Effort: Pulmonary effort is normal. No tachypnea, accessory muscle usage, respiratory distress or retractions.      Breath sounds: No stridor. Decreased breath sounds present. No wheezing, rhonchi or rales.   Abdominal:      General: Abdomen is protuberant. Bowel sounds are normal.      Tenderness: There is abdominal tenderness in the right lower quadrant. There is no right CVA tenderness, left CVA tenderness or guarding. Negative signs include Coon's sign.   Musculoskeletal:      Cervical back: Neck supple. No rigidity.      Right lower leg: No edema.   Lymphadenopathy:      Cervical: No cervical adenopathy.   Skin:     General: Skin is warm and dry.      Capillary Refill: Capillary refill takes less than 2 seconds.      Coloration: Skin is not cyanotic or mottled.      Findings: No rash or wound.      Nails: There is no clubbing.   Neurological:      General: No focal deficit present.      Mental Status: She is alert, oriented to person, place, and time and easily aroused.      GCS: GCS eye subscore is 4. GCS verbal subscore is 5. GCS motor subscore is 6.      Cranial Nerves: Cranial nerves are intact.      Sensory: Sensation is intact.      Motor: Motor function is intact.   Psychiatric:         Mood and Affect: Mood is not anxious.         Speech: Speech normal.         Behavior: Behavior is not agitated. Behavior is cooperative.         Cognition and Memory: Cognition normal.         Medications  Current Facility-Administered Medications   Medication Dose Route Frequency Provider Last Rate Last  Admin   • ketorolac (TORADOL) injection 30 mg  30 mg Intravenous Q6HRS PRN Dipak Izquierdo M.D.   30 mg at 04/16/21 0536   • albuterol inhaler 2 Puff  2 Puff Inhalation Q2HRS PRN Alec Ramey M.D.   2 Puff at 04/16/21 0514   • ipratropium-albuterol (DUONEB) nebulizer solution  3 mL Nebulization Q4HRS (RT) Alec Ramey M.D.   3 mL at 04/16/21 0738   • acetaminophen (TYLENOL) tablet 1,000 mg  1,000 mg Oral TID PRN Sari Virk M.D.   1,000 mg at 04/16/21 0536   • senna-docusate (PERICOLACE or SENOKOT S) 8.6-50 MG per tablet 2 tablet  2 tablet Oral BID Alec Ramey M.D.   Stopped at 04/15/21 1800    And   • polyethylene glycol/lytes (MIRALAX) PACKET 1 Packet  1 Packet Oral QDAY PRN Alec Ramey M.D.        And   • magnesium hydroxide (MILK OF MAGNESIA) suspension 30 mL  30 mL Oral QDAY PRN Alec Ramey M.D.        And   • bisacodyl (DULCOLAX) suppository 10 mg  10 mg Rectal QDAY PRN Alec Ramey M.D.       • Respiratory Therapy Consult   Nebulization Continuous RT Alec Ramey M.D.       • enalaprilat (VASOTEC) injection 1.25 mg  1.25 mg Intravenous Q6HRS PRN Alec Ramey M.D.       • aspirin EC (ECOTRIN) tablet 81 mg  81 mg Oral QAM Alec Ramey M.D.   81 mg at 04/16/21 0506   • baclofen (LIORESAL) tablet 10 mg  10 mg Oral TID Alec Ramey M.D.   10 mg at 04/16/21 0446   • benzonatate (TESSALON) capsule 100 mg  100 mg Oral TID PRN Alec Ramey M.D.       • busPIRone (BUSPAR) tablet 10 mg  10 mg Oral TID Alec Ramey M.D.   10 mg at 04/16/21 0446   • FLUoxetine (PROZAC) capsule 40 mg  40 mg Oral DAILY Alec Ramey M.D.   40 mg at 04/16/21 0506   • ivabradine (Corlanor) tablet 7.5 mg  7.5 mg Oral BID WITH MEALS Alec Ramey M.D.   Stopped at 04/15/21 1730   • levothyroxine (SYNTHROID) tablet 100 mcg  100 mcg Oral AM ES Alec Ramey M.D.   100 mcg at 04/16/21 0506   • metoprolol SR (TOPROL XL) tablet 12.5 mg  12.5  mg Oral Q EVENING Alec Ramey M.D.   Stopped at 04/15/21 1800   • mycophenolate (CELLCEPT) capsule 1,000 mg  1,000 mg Oral BID Alec Ramey M.D.   1,000 mg at 04/16/21 0449   • OXcarbazepine (TRILEPTAL) tablet 300 mg  300 mg Oral BID Alec Ramey M.D.   300 mg at 04/16/21 0448   • pregabalin (LYRICA) capsule 300 mg  300 mg Oral BID Alec Ramey M.D.   300 mg at 04/15/21 2258   • topiramate (TOPAMAX) tablet 50 mg  50 mg Oral BID Alec Ramey M.D.   50 mg at 04/16/21 0447   • traZODone (DESYREL) tablet 100 mg  100 mg Oral QHS Alec Ramey M.D.   100 mg at 04/15/21 2137   • ziprasidone (GEODON) capsule 40 mg  40 mg Oral BID Alec Ramey M.D.   40 mg at 04/16/21 0449   • oxybutynin (DITROPAN) tablet 5 mg  5 mg Oral BID Alec Ramey M.D.   5 mg at 04/16/21 0447   • omeprazole (PRILOSEC) capsule 20 mg  20 mg Oral DAILY Sari Virk M.D.   20 mg at 04/16/21 0506   • insulin lispro (HumaLOG) injection  1-6 Units Subcutaneous 4X/DAY [facility]   1 Units at 04/15/21 2141    And   • glucose 4 g chewable tablet 16 g  16 g Oral Q15 MIN PRN Alec Ramey M.D.        And   • dextrose 50% (D50W) injection 50 mL  50 mL Intravenous Q15 MIN PRN Alec Ramey M.D.       • budesonide-formoterol (SYMBICORT) 80-4.5 MCG/ACT inhaler 2 Puff  2 Puff Inhalation BID (RT) Sari Virk M.D.   2 Puff at 04/16/21 0738   • montelukast (SINGULAIR) tablet 10 mg  10 mg Oral Nightly Sari Virk M.D.   10 mg at 04/15/21 2137   • meropenem (MERREM) 500 mg in  mL IVPB  500 mg Intravenous Q6HRS Sari Virk M.D.   Stopped at 04/16/21 0518   • methylPREDNISolone sod succ (SOLU-MEDROL) 125 MG injection 62.5 mg  62.5 mg Intravenous Q6HRS John Brown M.D.   62.5 mg at 04/16/21 0447   • ondansetron (ZOFRAN ODT) dispertab 4 mg  4 mg Oral Q4HRS PRN Dipak Izquierdo M.D.   4 mg at 04/15/21 0652       Fluids    Intake/Output Summary (Last 24 hours) at  4/16/2021 0753  Last data filed at 4/16/2021 0539  Gross per 24 hour   Intake 5000 ml   Output 1600 ml   Net 3400 ml       Laboratory  Recent Labs     04/15/21  0824 04/15/21  1343   UOLWM68U 7.38* 7.32*   NQOMRG215C 33.6 34.9   CIARE102I 79.1 104.3*   NUGA6WXO 95.1 97.0   ARTHCO3 19 18   ARTBE -5* -8*         Recent Labs     04/15/21  0535 04/15/21  1040 04/15/21  1648 04/15/21  2005 04/15/21  2302   SODIUM 136   < > 138 139 138   POTASSIUM 3.3*   < > 4.8 4.7 4.4   CHLORIDE 104   < > 107 106 105   CO2 22   < > 18* 19* 18*   BUN 10   < > 8 7* 7*   CREATININE 0.61   < > 0.56 0.55 0.53   MAGNESIUM 1.7  --  1.8  --   --    CALCIUM 8.8   < > 9.0 9.1 9.0    < > = values in this interval not displayed.     Recent Labs     04/13/21  1300 04/13/21  1300 04/15/21  0535 04/15/21  1040 04/15/21  1648 04/15/21  2005 04/15/21  2302   ALTSGPT 18  --  18  --   --   --   --    ASTSGOT 12  --  11*  --   --   --   --    ALKPHOSPHAT 87  --  90  --   --   --   --    TBILIRUBIN 0.3  --  <0.2  --   --   --   --    GLUCOSE 123*   < > 261*   < > 198* 166* 170*    < > = values in this interval not displayed.     Recent Labs     04/13/21  1300 04/13/21  1300 04/15/21  0535 04/15/21  1648 04/16/21  0500   WBC 4.5*   < > 5.0 5.7 9.1   NEUTSPOLYS 70.70   < > 63.90 89.50* 90.00*   LYMPHOCYTES 19.30*   < > 25.00 7.30* 6.60*   MONOCYTES 6.70   < > 8.70 1.60 2.50   EOSINOPHILS 2.20   < > 0.40 0.00 0.00   BASOPHILS 0.40   < > 0.40 0.20 0.10   ASTSGOT 12  --  11*  --   --    ALTSGPT 18  --  18  --   --    ALKPHOSPHAT 87  --  90  --   --    TBILIRUBIN 0.3  --  <0.2  --   --     < > = values in this interval not displayed.     Recent Labs     04/15/21  0535 04/15/21  1648 04/16/21  0500   RBC 4.05* 3.89* 3.61*   HEMOGLOBIN 11.7* 11.5* 10.7*   HEMATOCRIT 37.8 36.4* 32.8*   PLATELETCT 172 182 195   PROTHROMBTM  --  13.4  --    INR  --  1.00  --        Imaging  X-Ray:  I have personally reviewed the images and compared with prior images.  CT:     Reviewed    Assessment/Plan  * Lactic acidosis  Assessment & Plan  Main issue appears to be metabolic acidosis with elevated/trending up lactic acid  DDx includes: sepsis, b-agonist, dehydration, type B    Asthma exacerbation- (present on admission)  Assessment & Plan  History of asthma, uses home nebulizer  Status post antibiotics, prednisone and nebulizer x48 hours after ER visit without improvement  X-ray does not reveal pneumonia  No bronchospasm on exam  Dyspnea likely related to metabolic acidosis  Decrease IV steroids to BID  Duoneb every 2 hours as needed  RT/O2 Protocols  Titrate supplemental FiO2 to maintain SpO2 >92%  Monitor for the need BiPAP and/or intubation mechanical ventilation, current work of breathing is normal and oxygenation is good    Sepsis (HCC)  Assessment & Plan  This is Sepsis Present on admission  SIRS criteria identified on my evaluation include: Tachycardia, with heart rate greater than 90 BPM and Tachypnea, with respirations greater than 20 per minute  Source is abdomen?  Lung? Urine?  Suspected onset of infection less than 1 week  Sepsis protocol initiated  Fluid resuscitation ordered per protocol  IV antibiotics as appropriate for source of sepsis  While organ dysfunction may be noted elsewhere in this problem list or in the chart, degree of organ dysfunction does not meet CMS criteria for severe sepsis    CT without obvious source of sepsis  Cultures negative thus far    Diabetes mellitus type 2 in obese (HCC)- (present on admission)  Assessment & Plan  Goal blood glucose 140-180  sliding scale insulin, accuchecks  hypoglycemia protocol    Hyperglycemia  Assessment & Plan  But sugar running in the 200-250 range  Serial blood sugar testing  SSI as needed  Hypoglycemia protocols as needed  HgA1c    Atrial fibrillation (HCC)  Assessment & Plan  Currently in sinus rhythm  History of ablation  Continue home ivabradine and metoprolol    Blood per rectum  Assessment & Plan  History of  blood per rectum intermittently this last week per patient  Hematocrit normal  Has not had any bowel movements since arrival  Monitor for bleeding and Hemoccult stools  GI consultation as needed  CT abdomen/pelvis negative    Schizoaffective disorder, depressive type (HCC)- (present on admission)  Assessment & Plan  Resume home regimen when clinically appropriate  Part list multiple other psychiatric diagnoses  Psychiatry consultation as needed    Immunocompromised (HCC)- (present on admission)  Assessment & Plan  On Cellcept for optic neuritis   Stop if source is found    Hypothyroidism- (present on admission)  Assessment & Plan  Recent TSH normal  Continue home repletion regimen    GERD (gastroesophageal reflux disease)- (present on admission)  Assessment & Plan  PPI  Antireflux measures    Morbidly obese (HCC)- (present on admission)  Assessment & Plan  Clinically appropriate encourage behavioral modification and weight loss  History of TONYA not on treatment, monitor while in ICU  Most recent TSH normal       VTE:  Lovenox  Ulcer: Not Indicated  Lines: None    I have performed a physical exam and reviewed and updated ROS and Plan today (4/16/2021). In review of yesterday's note (4/15/2021), there are no changes except as documented above.     Ongoing evaluation and treatment of her metabolic acidosis due to cryptic lactic acidosis which may represent sepsis in this immunocompromised patient. This patient is critically ill, at high risk for decompensation leading to worsening vital organ dysfunction and death without critical care interventions.    Discussed patient condition and risk of morbidity and/or mortality with RN, RT, Pharmacy, Dietary, Code status disscussed, Charge nurse / hot rounds and Patient    The patient remains critically ill.  Critical care time = 32 minutes in directly providing and coordinating critical care and extensive data review.  No time overlap and excludes procedures.

## 2021-04-16 NOTE — PROGRESS NOTES
Community Health Worker Intake  • Social determinates of health intake Complete.   • Identified barriers to Transportation, food.  • Contact information provided to Kristin Balderrama   • Has PCP appointment scheduled   • Scheduled Food Delivery  • Accepted Meds-To-Beds.   • Inpatient assessment completed.    DALY Bejarano met with pt bedside to introduce CCM and GSC programs. Pt accepted GSC. Pt states a need for transportation states [facility] is to unreliable. CHW will mail resources to the pt. Pt states need for food, CHW completed [employer] forms with pt. Pt states need for rent assistance, CHW introduce [employer] and [employer] and mailed applications to the pt. Pt identifies no other needs at this time.    Plan: CHW will refer pt to care chest and follow up after d/c

## 2021-04-16 NOTE — RESPIRATORY CARE
"   COPD EDUCATION by COPD CLINICAL EDUCATOR  4/16/2021 at 7:59 AM by Sue Jacobs, RRT     Patient reviewed by COPD education team. Patient does not have a history or diagnosis of COPD, her PFT results from 9/2019 showed - \"Pulmonary function tests are consistent with mild restrictive   process and may be secondary to obesity\" and she has never smoked.  Therefore does not qualify for the COPD program.    COPD Screen  COPD Risk Screening  Do you have a history of COPD?: Yes  Do you have a Pulmonologist?: No  COPD Population Screener  During the past 4 weeks, how much did you feel short of breath?: Most  or all of the time  Do you ever cough up any mucus or phlegm?: Yes, a few days a week or month  In the past 12 months, you do less than you used to because of your breathing problems: Agree  Have you smoked at least 100 cigarettes in your entire life?: No/don't know  How old are [age] 35-48  COPD Screening Score: 4    COPD Assessment  COPD Clinical Specialists ONLY  COPD Education Initiated: No--Quick Screen:  Pt has asthma, PFT 9/2019 resuled as \"Pulmonary function tests are consistent with mild restrictive process and may be secondary to obesity. \" She has never smoked and is 30 y.o.  Is this a COPD exacerbation patient?: No           "

## 2021-04-16 NOTE — DIETARY
NUTRITION SERVICES: BMI - Pt with BMI >40 (=Body mass index is 41.27 kg/m².), Class III obesity. Weight loss counseling not appropriate in acute care setting. RECOMMEND - If appropriate at DC please refer to outpatient nutrition services for weight management.

## 2021-04-16 NOTE — ASSESSMENT & PLAN NOTE
Taking cellcept for optic neuritis.  -continue cellcept 1000mg PO BID  -PCP/neurology follow up for possible reduced dose/cessation

## 2021-04-16 NOTE — ASSESSMENT & PLAN NOTE
Presented in acute asthma exacerbation, was found to have lactic acidosis, with progressive elevation to 6.6. Afebrile, unlikely infectious source

## 2021-04-16 NOTE — CARE PLAN
Problem: Communication  Goal: The ability to communicate needs accurately and effectively will improve  Outcome: PROGRESSING AS EXPECTED     Problem: Safety  Goal: Will remain free from injury  Outcome: PROGRESSING AS EXPECTED  Goal: Will remain free from falls  Outcome: PROGRESSING AS EXPECTED     Problem: Infection  Goal: Will remain free from infection  Outcome: PROGRESSING AS EXPECTED     Problem: Venous Thromboembolism (VTW)/Deep Vein Thrombosis (DVT) Prevention:  Goal: Patient will participate in Venous Thrombosis (VTE)/Deep Vein Thrombosis (DVT)Prevention Measures  Outcome: PROGRESSING AS EXPECTED     Problem: Bowel/Gastric:  Goal: Normal bowel function is maintained or improved  Outcome: PROGRESSING AS EXPECTED  Goal: Will not experience complications related to bowel motility  Outcome: PROGRESSING AS EXPECTED     Problem: Knowledge Deficit  Goal: Knowledge of disease process/condition, treatment plan, diagnostic tests, and medications will improve  Outcome: PROGRESSING AS EXPECTED  Goal: Knowledge of the prescribed therapeutic regimen will improve  Outcome: PROGRESSING AS EXPECTED     Problem: Discharge Barriers/Planning  Goal: Patient's continuum of care needs will be met  Outcome: PROGRESSING AS EXPECTED     Problem: Pain Management  Goal: Pain level will decrease to patient's comfort goal  Outcome: PROGRESSING AS EXPECTED     Problem: Respiratory:  Goal: Respiratory status will improve  Outcome: PROGRESSING AS EXPECTED     Problem: Fluid Volume:  Goal: Will maintain balanced intake and output  Outcome: PROGRESSING AS EXPECTED     Problem: Psychosocial Needs:  Goal: Level of anxiety will decrease  Outcome: PROGRESSING AS EXPECTED     Problem: Urinary Elimination:  Goal: Ability to reestablish a normal urinary elimination pattern will improve  Outcome: PROGRESSING AS EXPECTED     Problem: Skin Integrity  Goal: Risk for impaired skin integrity will decrease  Outcome: PROGRESSING AS EXPECTED

## 2021-04-17 LAB
ANION GAP SERPL CALC-SCNC: 9 MMOL/L (ref 7–16)
BACTERIA UR CULT: NORMAL
BASOPHILS # BLD AUTO: 0.1 % (ref 0–1.8)
BASOPHILS # BLD: 0.01 K/UL (ref 0–0.12)
BUN SERPL-MCNC: 21 MG/DL (ref 8–22)
C TRACH DNA SPEC QL NAA+PROBE: NEGATIVE
CALCIUM SERPL-MCNC: 9.1 MG/DL (ref 8.5–10.5)
CHLORIDE SERPL-SCNC: 104 MMOL/L (ref 96–112)
CO2 SERPL-SCNC: 24 MMOL/L (ref 20–33)
CREAT SERPL-MCNC: 0.72 MG/DL (ref 0.5–1.4)
EOSINOPHIL # BLD AUTO: 0 K/UL (ref 0–0.51)
EOSINOPHIL NFR BLD: 0 % (ref 0–6.9)
ERYTHROCYTE [DISTWIDTH] IN BLOOD BY AUTOMATED COUNT: 49.1 FL (ref 35.9–50)
FERRITIN SERPL-MCNC: 38.3 NG/ML (ref 10–291)
GLUCOSE BLD-MCNC: 131 MG/DL (ref 65–99)
GLUCOSE BLD-MCNC: 131 MG/DL (ref 65–99)
GLUCOSE BLD-MCNC: 138 MG/DL (ref 65–99)
GLUCOSE BLD-MCNC: 174 MG/DL (ref 65–99)
GLUCOSE SERPL-MCNC: 142 MG/DL (ref 65–99)
HCT VFR BLD AUTO: 39.4 % (ref 37–47)
HGB BLD-MCNC: 12.3 G/DL (ref 12–16)
IMM GRANULOCYTES # BLD AUTO: 0.07 K/UL (ref 0–0.11)
IMM GRANULOCYTES NFR BLD AUTO: 0.8 % (ref 0–0.9)
IRON SATN MFR SERPL: 11 % (ref 15–55)
IRON SERPL-MCNC: 40 UG/DL (ref 40–170)
LACTATE BLD-SCNC: 1.8 MMOL/L (ref 0.5–2)
LACTATE BLD-SCNC: 1.9 MMOL/L (ref 0.5–2)
LACTATE BLD-SCNC: 2.9 MMOL/L (ref 0.5–2)
LYMPHOCYTES # BLD AUTO: 0.74 K/UL (ref 1–4.8)
LYMPHOCYTES NFR BLD: 8.3 % (ref 22–41)
MCH RBC QN AUTO: 29.1 PG (ref 27–33)
MCHC RBC AUTO-ENTMCNC: 31.2 G/DL (ref 33.6–35)
MCV RBC AUTO: 93.1 FL (ref 81.4–97.8)
MONOCYTES # BLD AUTO: 0.25 K/UL (ref 0–0.85)
MONOCYTES NFR BLD AUTO: 2.8 % (ref 0–13.4)
N GONORRHOEA DNA SPEC QL NAA+PROBE: NEGATIVE
NEUTROPHILS # BLD AUTO: 7.86 K/UL (ref 2–7.15)
NEUTROPHILS NFR BLD: 88 % (ref 44–72)
NRBC # BLD AUTO: 0 K/UL
NRBC BLD-RTO: 0 /100 WBC
PLATELET # BLD AUTO: 218 K/UL (ref 164–446)
PMV BLD AUTO: 11.8 FL (ref 9–12.9)
POTASSIUM SERPL-SCNC: 4.4 MMOL/L (ref 3.6–5.5)
RBC # BLD AUTO: 4.23 M/UL (ref 4.2–5.4)
SIGNIFICANT IND 70042: NORMAL
SITE SITE: NORMAL
SODIUM SERPL-SCNC: 137 MMOL/L (ref 135–145)
SOURCE SOURCE: NORMAL
SPECIMEN SOURCE: NORMAL
TIBC SERPL-MCNC: 378 UG/DL (ref 250–450)
TROPONIN T SERPL-MCNC: <6 NG/L (ref 6–19)
UIBC SERPL-MCNC: 338 UG/DL (ref 110–370)
WBC # BLD AUTO: 8.9 K/UL (ref 4.8–10.8)

## 2021-04-17 PROCEDURE — 700102 HCHG RX REV CODE 250 W/ 637 OVERRIDE(OP): Performed by: INTERNAL MEDICINE

## 2021-04-17 PROCEDURE — 99232 SBSQ HOSP IP/OBS MODERATE 35: CPT | Performed by: INTERNAL MEDICINE

## 2021-04-17 PROCEDURE — 94010 BREATHING CAPACITY TEST: CPT

## 2021-04-17 PROCEDURE — 94640 AIRWAY INHALATION TREATMENT: CPT

## 2021-04-17 PROCEDURE — 99233 SBSQ HOSP IP/OBS HIGH 50: CPT | Performed by: HOSPITALIST

## 2021-04-17 PROCEDURE — 700101 HCHG RX REV CODE 250: Performed by: STUDENT IN AN ORGANIZED HEALTH CARE EDUCATION/TRAINING PROGRAM

## 2021-04-17 PROCEDURE — 700111 HCHG RX REV CODE 636 W/ 250 OVERRIDE (IP): Performed by: INTERNAL MEDICINE

## 2021-04-17 PROCEDURE — 700105 HCHG RX REV CODE 258: Performed by: INTERNAL MEDICINE

## 2021-04-17 PROCEDURE — A9270 NON-COVERED ITEM OR SERVICE: HCPCS | Performed by: INTERNAL MEDICINE

## 2021-04-17 PROCEDURE — 84484 ASSAY OF TROPONIN QUANT: CPT

## 2021-04-17 PROCEDURE — 83550 IRON BINDING TEST: CPT

## 2021-04-17 PROCEDURE — 700102 HCHG RX REV CODE 250 W/ 637 OVERRIDE(OP): Performed by: STUDENT IN AN ORGANIZED HEALTH CARE EDUCATION/TRAINING PROGRAM

## 2021-04-17 PROCEDURE — 85025 COMPLETE CBC W/AUTO DIFF WBC: CPT

## 2021-04-17 PROCEDURE — 82962 GLUCOSE BLOOD TEST: CPT | Mod: 91

## 2021-04-17 PROCEDURE — 700102 HCHG RX REV CODE 250 W/ 637 OVERRIDE(OP): Performed by: HOSPITALIST

## 2021-04-17 PROCEDURE — A9270 NON-COVERED ITEM OR SERVICE: HCPCS | Performed by: HOSPITALIST

## 2021-04-17 PROCEDURE — 80048 BASIC METABOLIC PNL TOTAL CA: CPT

## 2021-04-17 PROCEDURE — A9270 NON-COVERED ITEM OR SERVICE: HCPCS | Performed by: STUDENT IN AN ORGANIZED HEALTH CARE EDUCATION/TRAINING PROGRAM

## 2021-04-17 PROCEDURE — 83605 ASSAY OF LACTIC ACID: CPT | Mod: 91

## 2021-04-17 PROCEDURE — 770001 HCHG ROOM/CARE - MED/SURG/GYN PRIV*

## 2021-04-17 PROCEDURE — 82728 ASSAY OF FERRITIN: CPT

## 2021-04-17 PROCEDURE — 83540 ASSAY OF IRON: CPT

## 2021-04-17 RX ORDER — AZITHROMYCIN 250 MG/1
500 TABLET, FILM COATED ORAL DAILY
Status: COMPLETED | OUTPATIENT
Start: 2021-04-17 | End: 2021-04-19

## 2021-04-17 RX ORDER — OXYCODONE HYDROCHLORIDE 5 MG/1
5 TABLET ORAL ONCE
Status: COMPLETED | OUTPATIENT
Start: 2021-04-17 | End: 2021-04-17

## 2021-04-17 RX ADMIN — INSULIN LISPRO 1 UNITS: 100 INJECTION, SOLUTION INTRAVENOUS; SUBCUTANEOUS at 20:34

## 2021-04-17 RX ADMIN — TOPIRAMATE 50 MG: 100 TABLET, FILM COATED ORAL at 05:13

## 2021-04-17 RX ADMIN — OXYCODONE HYDROCHLORIDE 5 MG: 5 TABLET ORAL at 13:18

## 2021-04-17 RX ADMIN — OXYCODONE HYDROCHLORIDE 5 MG: 5 TABLET ORAL at 04:57

## 2021-04-17 RX ADMIN — BACLOFEN 10 MG: 10 TABLET ORAL at 05:13

## 2021-04-17 RX ADMIN — TRAZODONE HYDROCHLORIDE 100 MG: 100 TABLET ORAL at 20:32

## 2021-04-17 RX ADMIN — Medication 81 MG: at 05:13

## 2021-04-17 RX ADMIN — OMEPRAZOLE 20 MG: 20 CAPSULE, DELAYED RELEASE ORAL at 05:13

## 2021-04-17 RX ADMIN — AZITHROMYCIN MONOHYDRATE 500 MG: 250 TABLET ORAL at 15:30

## 2021-04-17 RX ADMIN — DOCUSATE SODIUM 50 MG AND SENNOSIDES 8.6 MG 2 TABLET: 8.6; 5 TABLET, FILM COATED ORAL at 05:12

## 2021-04-17 RX ADMIN — BUDESONIDE AND FORMOTEROL FUMARATE DIHYDRATE 2 PUFF: 80; 4.5 AEROSOL RESPIRATORY (INHALATION) at 10:28

## 2021-04-17 RX ADMIN — IVABRADINE 7.5 MG: 7.5 TABLET, FILM COATED ORAL at 07:54

## 2021-04-17 RX ADMIN — OXCARBAZEPINE 300 MG: 300 TABLET, FILM COATED ORAL at 17:38

## 2021-04-17 RX ADMIN — PREGABALIN 300 MG: 150 CAPSULE ORAL at 10:28

## 2021-04-17 RX ADMIN — FLUOXETINE 40 MG: 20 CAPSULE ORAL at 05:12

## 2021-04-17 RX ADMIN — TOPIRAMATE 50 MG: 100 TABLET, FILM COATED ORAL at 17:39

## 2021-04-17 RX ADMIN — PREGABALIN 300 MG: 150 CAPSULE ORAL at 20:32

## 2021-04-17 RX ADMIN — METHYLPREDNISOLONE SODIUM SUCCINATE 62.5 MG: 125 INJECTION, POWDER, FOR SOLUTION INTRAMUSCULAR; INTRAVENOUS at 05:14

## 2021-04-17 RX ADMIN — METHYLPREDNISOLONE SODIUM SUCCINATE 62.5 MG: 125 INJECTION, POWDER, FOR SOLUTION INTRAMUSCULAR; INTRAVENOUS at 17:45

## 2021-04-17 RX ADMIN — MONTELUKAST 10 MG: 10 TABLET, FILM COATED ORAL at 21:00

## 2021-04-17 RX ADMIN — KETOROLAC TROMETHAMINE 30 MG: 30 INJECTION, SOLUTION INTRAMUSCULAR; INTRAVENOUS at 17:54

## 2021-04-17 RX ADMIN — MYCOPHENOLATE MOFETIL 500 MG: 250 CAPSULE ORAL at 17:38

## 2021-04-17 RX ADMIN — METOPROLOL SUCCINATE 12.5 MG: 25 TABLET, EXTENDED RELEASE ORAL at 17:38

## 2021-04-17 RX ADMIN — BUSPIRONE HYDROCHLORIDE 10 MG: 10 TABLET ORAL at 12:20

## 2021-04-17 RX ADMIN — BACLOFEN 10 MG: 10 TABLET ORAL at 17:36

## 2021-04-17 RX ADMIN — LEVOTHYROXINE SODIUM 100 MCG: 0.1 TABLET ORAL at 05:12

## 2021-04-17 RX ADMIN — BACLOFEN 10 MG: 10 TABLET ORAL at 12:20

## 2021-04-17 RX ADMIN — BUSPIRONE HYDROCHLORIDE 10 MG: 10 TABLET ORAL at 17:36

## 2021-04-17 RX ADMIN — IVABRADINE 7.5 MG: 7.5 TABLET, FILM COATED ORAL at 17:37

## 2021-04-17 RX ADMIN — MYCOPHENOLATE MOFETIL 500 MG: 250 CAPSULE ORAL at 05:13

## 2021-04-17 RX ADMIN — OXYBUTYNIN CHLORIDE 5 MG: 5 TABLET ORAL at 17:38

## 2021-04-17 RX ADMIN — ZIPRASIDONE HYDROCHLORIDE 40 MG: 40 CAPSULE ORAL at 05:13

## 2021-04-17 RX ADMIN — ACETAMINOPHEN 1000 MG: 500 TABLET, FILM COATED ORAL at 07:54

## 2021-04-17 RX ADMIN — ACETAMINOPHEN 1000 MG: 500 TABLET, FILM COATED ORAL at 15:30

## 2021-04-17 RX ADMIN — OXCARBAZEPINE 300 MG: 300 TABLET, FILM COATED ORAL at 05:13

## 2021-04-17 RX ADMIN — MEROPENEM 500 MG: 500 INJECTION, POWDER, FOR SOLUTION INTRAVENOUS at 05:14

## 2021-04-17 RX ADMIN — DOCUSATE SODIUM 50 MG AND SENNOSIDES 8.6 MG 2 TABLET: 8.6; 5 TABLET, FILM COATED ORAL at 17:39

## 2021-04-17 RX ADMIN — BUDESONIDE AND FORMOTEROL FUMARATE DIHYDRATE 2 PUFF: 80; 4.5 AEROSOL RESPIRATORY (INHALATION) at 20:00

## 2021-04-17 RX ADMIN — BUSPIRONE HYDROCHLORIDE 10 MG: 10 TABLET ORAL at 05:13

## 2021-04-17 RX ADMIN — OXYBUTYNIN CHLORIDE 5 MG: 5 TABLET ORAL at 05:12

## 2021-04-17 RX ADMIN — ALBUTEROL SULFATE 2 PUFF: 90 AEROSOL, METERED RESPIRATORY (INHALATION) at 14:21

## 2021-04-17 RX ADMIN — ENOXAPARIN SODIUM 40 MG: 40 INJECTION SUBCUTANEOUS at 05:12

## 2021-04-17 RX ADMIN — ENOXAPARIN SODIUM 40 MG: 40 INJECTION SUBCUTANEOUS at 17:37

## 2021-04-17 RX ADMIN — OXYCODONE HYDROCHLORIDE 5 MG: 5 TABLET ORAL at 18:48

## 2021-04-17 RX ADMIN — ZIPRASIDONE HYDROCHLORIDE 40 MG: 40 CAPSULE ORAL at 17:39

## 2021-04-17 ASSESSMENT — PAIN DESCRIPTION - PAIN TYPE
TYPE: ACUTE PAIN

## 2021-04-17 ASSESSMENT — ENCOUNTER SYMPTOMS
MUSCULOSKELETAL NEGATIVE: 1
NEUROLOGICAL NEGATIVE: 1
BLOOD IN STOOL: 1
ABDOMINAL PAIN: 1
HEARTBURN: 0
CARDIOVASCULAR NEGATIVE: 1
SPUTUM PRODUCTION: 1
FEVER: 0
DIZZINESS: 0
WEAKNESS: 0
VOMITING: 0
HEADACHES: 0
COUGH: 1
DEPRESSION: 0
NAUSEA: 0
FOCAL WEAKNESS: 0
BRUISES/BLEEDS EASILY: 0
EYES NEGATIVE: 1
NERVOUS/ANXIOUS: 1
PALPITATIONS: 0
CHILLS: 0
POLYDIPSIA: 0
BACK PAIN: 0
NECK PAIN: 0
WHEEZING: 0

## 2021-04-17 ASSESSMENT — PULMONARY FUNCTION TESTS: PEFR: 350

## 2021-04-17 NOTE — PROGRESS NOTES
Infectious Disease Progress Note    Author: Ligia Rios M.D. Date & Time of service: 2021  12:33 PM    Chief Complaint:  Concern for ESBL infection.  Dyspnea and abd pain      Interval History:  30-year-old female who was admitted with asthma exacerbation with chest pain and abd pain   AF WBC 8.9 cultures neg-somnolent today-No acute events overnight. Resp status improved    Labs Reviewed, Medications Reviewed and Radiology Reviewed.    Review of Systems:  Review of Systems   Unable to perform ROS: Other   Constitutional: Negative for fever.       Hemodynamics:  Temp (24hrs), Av.4 °C (97.6 °F), Min:36.3 °C (97.3 °F), Max:36.7 °C (98.1 °F)  Temperature: 36.7 °C (98.1 °F)  Pulse  Av.9  Min: 60  Max: 89   Blood Pressure: 108/62       Physical Exam:  Physical Exam  Vitals and nursing note reviewed.   Constitutional:       General: She is not in acute distress.     Appearance: She is not ill-appearing, toxic-appearing or diaphoretic.   HENT:      Nose: No rhinorrhea.   Eyes:      General:         Right eye: No discharge.         Left eye: No discharge.   Cardiovascular:      Rate and Rhythm: Normal rate. Rhythm irregular.   Pulmonary:      Effort: Pulmonary effort is normal. No respiratory distress.      Breath sounds: No stridor.   Abdominal:      General: There is no distension.   Skin:     Coloration: Skin is pale. Skin is not jaundiced.   Neurological:      Comments: somnolent         Meds:    Current Facility-Administered Medications:   •  methylPREDNISolone  •  ketorolac  •  mycophenolate  •  enoxaparin (LOVENOX) injection  •  oxyCODONE immediate-release  •  albuterol  •  acetaminophen  •  senna-docusate **AND** polyethylene glycol/lytes **AND** magnesium hydroxide **AND** bisacodyl  •  Respiratory Therapy Consult  •  enalaprilat  •  aspirin  •  baclofen  •  benzonatate  •  busPIRone  •  FLUoxetine  •  ivabradine  •  levothyroxine  •  metoprolol SR  •  OXcarbazepine  •  pregabalin  •   topiramate  •  traZODone  •  ziprasidone  •  oxybutynin  •  omeprazole  •  insulin lispro **AND** POC blood glucose manual result **AND** NOTIFY MD and PharmD **AND** glucose **AND** dextrose 50%  •  budesonide-formoterol  •  montelukast  •  ondansetron    Labs:  Recent Labs     04/15/21  1648 04/16/21  0500 04/17/21  0805   WBC 5.7 9.1 8.9   RBC 3.89* 3.61* 4.23   HEMOGLOBIN 11.5* 10.7* 12.3   HEMATOCRIT 36.4* 32.8* 39.4   MCV 93.6 90.9 93.1   MCH 29.6 29.6 29.1   RDW 48.8 47.1 49.1   PLATELETCT 182 195 218   MPV 12.1 11.7 11.8   NEUTSPOLYS 89.50* 90.00* 88.00*   LYMPHOCYTES 7.30* 6.60* 8.30*   MONOCYTES 1.60 2.50 2.80   EOSINOPHILS 0.00 0.00 0.00   BASOPHILS 0.20 0.10 0.10     Recent Labs     04/15/21  2005 04/15/21  2302 04/17/21  0805   SODIUM 139 138 137   POTASSIUM 4.7 4.4 4.4   CHLORIDE 106 105 104   CO2 19* 18* 24   GLUCOSE 166* 170* 142*   BUN 7* 7* 21     Recent Labs     04/15/21  0535 04/15/21  1040 04/15/21  2005 04/15/21  2302 04/17/21  0805   ALBUMIN 4.2  --   --   --   --    TBILIRUBIN <0.2  --   --   --   --    ALKPHOSPHAT 90  --   --   --   --    TOTPROTEIN 6.1  --   --   --   --    ALTSGPT 18  --   --   --   --    ASTSGOT 11*  --   --   --   --    CREATININE 0.61   < > 0.55 0.53 0.72    < > = values in this interval not displayed.       Imaging:  CT-ABDOMEN-PELVIS WITH    Result Date: 4/15/2021  4/15/2021 4:00 PM HISTORY/REASON FOR EXAM:  Abd pain, unspecified. Chronic urinary retention. Bowel and bladder incontinence. Asthma exacerbation. TECHNIQUE/EXAM DESCRIPTION: CT scan of the abdomen and pelvis with contrast. Contrast-enhanced helical scanning was obtained from the diaphragmatic domes through the pubic symphysis following the bolus administration of 100 mL of Omnipaque 350 nonionic contrast without complication. Low dose optimization technique was utilized for this CT exam including automated exposure control and adjustment of the mA and/or kV according to patient size. COMPARISON: 2/3/2021  FINDINGS: The visualized lung bases demonstrate minimal dependent atelectasis with a trace of right pleural fluid. There is no acute bony process. There is postoperative change in the lower lumbar spine. CT Abdomen: Liver measures 20 cm in length with mild low-attenuation. Spleen measures 15 cm in diameter with no focal lesions. The pancreas is unremarkable. Gallbladder is surgically absent. There is no biliary dilatation. The adrenal glands are normal in size. The kidneys enhance symmetrically. The abdominal aorta is normal in caliber. There is no lymphadenopathy. The bowel is unremarkable. There is moderate right-sided colonic stool. There is no bowel obstruction. There is no free fluid or free air. There are no CT findings of appendicitis. CT Pelvis: There is no acute inflammatory process in the pelvis. Uterus unchanged. Adnexal region unchanged with a 3.3 cm right ovarian simple cyst again noted. There is no free fluid. The previous suprapubic catheter is no longer seen. There is a generator battery in the right posterior flank region with a device wire extending into the right posterior pelvis, unchanged.     1.  There is no acute inflammatory process in the abdomen or pelvis. 2.  There is no bowel obstruction. 3.  There is hepatomegaly with hepatic steatosis. 4.  There is a mildly enlarged spleen, possibly related to body habitus. Findings were discussed with RAFFI ROBLEDO on 4/15/2021 at 4:26 PM.    CT-MAXILLOFACIAL WITH PLUS RECONS    Result Date: 3/24/2021  3/24/2021 3:21 PM HISTORY/REASON FOR EXAM:  Right-sided facial pain. History of right-sided tooth extraction 2 weeks ago. TECHNIQUE/EXAM DESCRIPTION AND NUMBER OF VIEWS:  CT scan of the maxillofacial with contrast, with reconstructions. Thin-section helical imaging was obtained of the maxillofacial structures from the orbital roofs through the mandible. Coronal and sagittal multiplanar volume reformat images were generated from the axial data., 75  mL of Omnipaque 350 nonionic contrast was injected intravenously. Low dose optimization technique was utilized for this CT exam including automated exposure control and adjustment of the mA and/or kV according to patient size. COMPARISON:  7/25/2020. FINDINGS: No facial subcutaneous edema or inflammation is identified. No abscess is identified. No perimandibular abscess or other mass density is identified. No lytic or blastic bony change is identified involving the mandible or maxilla. There is mild mucosal thickening in the right maxillary sinus antrum and minimal mucosal thickening in the left maxillary sinus consistent with chronic maxillary sinusitis. There is no evidence of acute sinusitis. No anterior or posterior cervical chain adenopathy is present. The vascular structures appear normal. No parotid or submandibular gland mass or asymmetry is present. No oral mucosal, posterior pharyngeal, laryngeal mucosal abnormality identified.     1.  No evidence of facial or perimandibular abscess or cellulitis. 2.  No anterior or posterior cervical chain adenopathy.    DX-CHEST-PORTABLE (1 VIEW)    Result Date: 4/15/2021  4/15/2021 12:28 PM HISTORY/REASON FOR EXAM:  Shortness of Breath; Worsening SOB TECHNIQUE/EXAM DESCRIPTION AND NUMBER OF VIEWS: Single portable view of the chest. COMPARISON:  4/15/2021 at 0537 hours FINDINGS: The cardiac silhouette is within normal limits. There is minimal linear atelectasis or fibrotic change in the left lower lobe. The lungs are otherwise clear. No pleural effusion is noted.     1.  Minimal linear atelectasis or fibrotic change in the left lower lobe. 2.  Otherwise clear chest.    DX-CHEST-PORTABLE (1 VIEW)    Result Date: 4/15/2021    4/15/2021 5:54 AM HISTORY/REASON FOR EXAM: Shortness of Breath TECHNIQUE/EXAM DESCRIPTION:  Single AP view of the chest. COMPARISON: April 13, 2021 FINDINGS: Overlying cardiac leads are present. The cardiac silhouette appears within normal limits. The  mediastinal contour appears within normal limits.  Bilateral perihilar interstitial prominence and bronchial wall cuffing is noted. The lungs appear well expanded bilaterally.  Slight hazy left lung base opacity is seen. No significant pleural effusions are identified. The bony structures appear age-appropriate.     1.  Slight hazy left lung base opacity suggests infiltrate 2.  Perihilar interstitial prominence and bronchial wall cuffing, appearance suggests changes of underlying bronchial inflammation, consider bronchitis.    DX-CHEST-PORTABLE (1 VIEW)    Result Date: 4/13/2021 4/13/2021 1:20 PM HISTORY/REASON FOR EXAM:  possible sepsis.. Shortness of breath and cough TECHNIQUE/EXAM DESCRIPTION AND NUMBER OF VIEWS: Single portable view of the chest. COMPARISON: April 6, 2021 FINDINGS: Heart size is within normal limits. No pulmonary infiltrates or consolidations are noted. No pleural abnormalities are noted.     No acute cardiac or pulmonary abnormalities are identified.    DX-CHEST-PORTABLE (1 VIEW)    Result Date: 4/6/2021 4/6/2021 7:50 AM HISTORY/REASON FOR EXAM:  Chest Pain. TECHNIQUE/EXAM DESCRIPTION AND NUMBER OF VIEWS: Single portable view of the chest. COMPARISON: 3/6/2021 FINDINGS: Cardiomediastinal contour is within normal limits. Lungs show hypoinflation. No focal pulmonary consolidation. No pleural fluid collection or pneumothorax. No major bony abnormality is seen.     Hypoinflation without other evidence for acute cardiopulmonary disease.    IR-MIDLINE CATHETER INSERTION WO GUIDANCE > AGE 3    Result Date: 4/16/2021  HISTORY/REASON FOR EXAM:  Midline Placement   TECHNIQUE/EXAM DESCRIPTION AND NUMBER OF VIEWS: Midline insertion with ultrasound guidance.  FINDINGS: Midline insertion with Ultrasound Guidance was performed by qualified nursing staff without the assistance of a Radiologist. Midline positioning as measured by RN or as appropriate length of catheter selected.              Ultrasound-guided  "midline placement performed by qualified nursing staff as above.       Micro:  Results     Procedure Component Value Units Date/Time    Urine Culture [[id number]] Collected: 04/15/21 1630    Order Status: Completed Specimen: Urine, [facility] Cath Updated: 04/17/21 0835     Significant Indicator NEG     Source UR     Site URINE, [facility] CATH     Culture Result Usual skin letty ,000 cfu/mL    Narrative:      UTILIZATION ALERT: Has Flu/RSV PCR testing been performed and  results reviewed?->Yes  If not done within the last 24 hours  Indication for culture:->Evaluation for sepsis without a  clear source of infection  Indication for culture:->Evaluation for sepsis without a    BLOOD CULTURE [[id number]] Collected: 04/15/21 0535    Order Status: Completed Specimen: Blood from Peripheral Updated: 04/16/21 0654     Significant Indicator NEG     Source BLD     Site PERIPHERAL     Culture Result No Growth  Note: Blood cultures are incubated for 5 days and  are monitored continuously.Positive blood cultures  are called to the RN and reported as soon as  they are identified.      Narrative:      Per Hospital Policy: Only change Specimen Src: to \"Line\" if  specified by physician order.  Left AC    BLOOD CULTURE [[id number]] Collected: 04/15/21 0615    Order Status: Completed Specimen: Blood from Peripheral Updated: 04/16/21 0654     Significant Indicator NEG     Source BLD     Site PERIPHERAL     Culture Result No Growth  Note: Blood cultures are incubated for 5 days and  are monitored continuously.Positive blood cultures  are called to the RN and reported as soon as  they are identified.      Narrative:      Per Hospital Policy: Only change Specimen Src: to \"Line\" if  specified by physician order.  Left AC    Chlamydia/GC PCR Urine Or Swab [[id number]] Collected: 04/15/21 1630    Order Status: Completed Updated: 04/15/21 2102     Source Urine    WET PREP [[id number]] Collected: 04/15/21 1955    Order Status: Completed Specimen: " Genital from Vaginal Updated: 04/15/21 2039     Significant Indicator NEG     Source GEN     Site VAGINAL     Wet Prep For Parasites No yeast.  No motile Trichomonas seen.  No clue cells seen.      Narrative:      Collected By:[id number] KANDY HELTON  Collected By:[id number] KANDY HELTON    Urinalysis [[id number]]  (Abnormal) Collected: 04/15/21 1630    Order Status: Completed Specimen: Urine Updated: 04/15/21 1707     Color Yellow     Character Clear     Specific Gravity 1.044     Ph 5.5     Glucose 500 mg/dL      Ketones Negative mg/dL      Protein Negative mg/dL      Bilirubin Negative     Urobilinogen, Urine 0.2     Nitrite Negative     Leukocyte Esterase Negative     Occult Blood Negative     Micro Urine Req see below     Comment: Microscopic examination not performed when specimen is clear  and chemically negative for protein, blood, leukocyte esterase  and nitrite.         Narrative:      UTILIZATION ALERT: Has Flu/RSV PCR testing been performed and  results reviewed?->Yes  If not done within the last 24 hours  Indication for culture:->Evaluation for sepsis without a  clear source of infection    CULTURE STOOL [[id number]]     Order Status: No result Specimen: Stool     Complete O&P [[id number]]     Order Status: No result Specimen: Stool     Chlamydia/GC PCR Urine Or Swab [[id number]]     Order Status: No result Specimen: Genital     Blood Culture [[id number]]     Order Status: Sent Specimen: Blood from Peripheral     Blood Culture [[id number]]     Order Status: Sent Specimen: Blood from Peripheral     COV-2, FLU A/B, AND RSV BY PCR (2-4 HOURS CEPHEID): Collect NP swab in VTM [[id number]] Collected: 04/15/21 0620    Order Status: Completed Specimen: Respirate from Nasopharyngeal Updated: 04/15/21 0716     Influenza virus A RNA Negative     Influenza virus B, PCR Negative     RSV, PCR Negative     SARS-CoV-2 by PCR NotDetected     Comment: PATIENTS: Important information regarding your results and instructions can  be  "found at [email or web address]   \"After your  Covid-19 Test\"  [facility] providers: PLEASE REFER TO DE-ESCALATION AND RETESTING PROTOCOL  on [email or web address]  **The [employer] GeneXpert Xpress SARS-CoV-2 RT-PCR Test has been made  available for use under the Emergency Use Authorization (EUA) only.          SARS-CoV-2 Source NP Swab    Narrative:      Have you been in close contact with a person who is suspected  or known to be positive for COVID-19 within the last 30 days  (e.g. last seen that person < 30 days ago)->Unknown    Respiratory Virus Panel by PCR [[id number]]     Order Status: Canceled           Assessment:  Active Hospital Problems    Diagnosis    • *Lactic acidosis [E87.2]    • Asthma exacerbation [J45.901]    • Sepsis (HCC) [A41.9]    • Hypokalemia [E87.6]    • TONYA (obstructive sleep apnea) [G47.33]    • Diabetes mellitus type 2 in obese ([facility]) [E11.69, E66.9]    • Hyperglycemia [R73.9]    • Blood per rectum [K62.5]    • Schizoaffective disorder, depressive type ([facility]) [F25.1]    • Immunocompromised ([facility]) [D84.9]    • Hypothyroidism [E03.9]    • GERD (gastroesophageal reflux disease) [K21.9]    • Morbidly obese ([facility]) [E66.01]    • Atrial fibrillation ([facility]) [I48.91]        Plan:  Abd pain  UA is negative   CT scan of the abdomen and pelvis neg except ovarian cyst and mild HSM  Blood cxs neg  DC meropenem     Asthma exacerbation  COVID neg  Continue with aggressive pulm treatment    Diabetes  Keep BS under 150 to help control current infection    borderline personality disorder   schizoaffective disorder  Continue meds    Immunocompromised due to her optic neuritis,   Continue meds    Will sign off-please reconsult if needed  "

## 2021-04-17 NOTE — CARE PLAN
Problem: Pain Management  Goal: Pain level will decrease to patient's comfort goal  Note: Pain assessed PRN and per unit protocol. Non-pharmacological measures utilized and medication given PRN per MAR.      Problem: Psychosocial Needs:  Goal: Level of anxiety will decrease  Note: Anxiety triggers identified. Calming techniques provided to patient. Patient is involved in POC and is encouraged to verbalize questions and concerns.    Pt is anxious, requiring frequent reassurance.

## 2021-04-17 NOTE — PROGRESS NOTES
Pulmonary/Critical Care Medicine   Progress Note    Date of service: 4/17/2021  Time: 8:46 PM    Lactic acidosis resolved.  Hemodynamically stable, stopping antimicrobials today. Patient stable to be transferred out of ICU today to medical.  I have discussed this case with hospitalist Dr. Rivas, he will assume care at this time. [facility] will sign off. Please call with any questions.    ABBY Shin Jr.O.  [facility]

## 2021-04-17 NOTE — CARE PLAN
Problem: Bronchoconstriction:  Goal: Improve in air movement and diminished wheezing  Outcome: PROGRESSING AS EXPECTED   Pt receiving Symbicort BID/ Albuterol PRN. Receiving 2 LPM via n/c.

## 2021-04-17 NOTE — PROGRESS NOTES
Hospital Medicine Daily Progress Note    Date of Service  4/17/2021    Chief Complaint  30 y.o. female admitted 4/15/2021 with shortness of breath    Hospital Course  30-year-old with a complicated past history including asthma, type 2 diabetes, history of A. fib/SVT s/p ablation, chronic urinary retention, bowel bladder incontinence s/p spinal cord stimulator, history of sacral neuromodulation 3/21, on CellCept for optic neuritis, hypothyroidism, sleep apnea on CPAP, benign intracranial hypertension, peripheral neuropathy, schizoaffective disorder, PTSD, GERD, presented with progressive cough over approximately 5 days, she had also a fever reported of 100 °F, treated outpatient with prednisone and azithromycin, came to the emergency room and was given duo nebs and prednisone, return back to the hospital with worsening symptoms per her report, reporting maroon-colored stools for 1 week, abdominal pain with a chronic ovarian cysts that was planned to be treated outpatient, initially elevated lactic acid which then increased further, negative respiratory panel including Covid influenza RSV, chest x-ray with left lung base opacity/bronchitis, the patient did not have respiratory failure, her work of breathing was not increased, infectious disease was consulted, the patient transferred to ICU secondary to increasing lactic acid levels.    Interval Problem Update  Patient seen and examined today. ICU Care  Care and plan discussed in IDT/Hot rounds.  Lines and assistive devices reviewed.    Patient tolerating treatment and therapies.  All Data, Medication data reviewed.  Case discussed with nursing as available.  Plan of Care reviewed with patient and notified of changes.  4/17 intensivist with a request to transfer care to the medical unit staff, the patient overall improved, her increased lactic acid level likely secondary to pulmonary condition not sepsis, infectious disease stopping antibiotics in light of overall  improvement and no definitive source, the patient is awake, alert and x4, afebrile, heart rate in the 60s, on 2 L oxygen, normotensive, lactic acid is down to 1.8, her CBC essentially unremarkable except for some neutrophilia, glucose 142, chemistry otherwise normal, the patient is asking for more abdominal pain medication and she is questioning if she should be back on antibiotics because she is expectorating colored sputum.  Consultants/Specialty  ID  Intensivist  Code Status  Full Code    Disposition  To medical unit    Review of Systems  Review of Systems   Constitutional: Positive for malaise/fatigue. Negative for chills and fever.   HENT: Negative.    Eyes: Negative.    Respiratory: Positive for cough and sputum production. Negative for wheezing.    Cardiovascular: Negative.  Negative for chest pain and palpitations.   Gastrointestinal: Positive for abdominal pain and blood in stool. Negative for heartburn, nausea and vomiting.   Genitourinary: Negative.  Negative for dysuria and frequency.   Musculoskeletal: Negative.  Negative for back pain and neck pain.   Skin: Negative.  Negative for itching and rash.   Neurological: Negative.  Negative for dizziness, focal weakness, weakness and headaches.   Endo/Heme/Allergies: Negative.  Negative for polydipsia. Does not bruise/bleed easily.   Psychiatric/Behavioral: Negative for depression. The patient is nervous/anxious.         Physical Exam  Temp:  [36.3 °C (97.3 °F)-36.7 °C (98.1 °F)] 36.7 °C (98.1 °F)  Pulse:  [60-87] 78  Resp:  [15-24] 20  BP: (102-132)/(43-74) 108/62  SpO2:  [93 %-98 %] 98 %    Physical Exam  Vitals and nursing note reviewed.   Constitutional:       Appearance: She is well-developed. She is morbidly obese. She is not diaphoretic.   HENT:      Head: Normocephalic and atraumatic.      Nose: Nose normal.   Eyes:      Conjunctiva/sclera: Conjunctivae normal.      Pupils: Pupils are equal, round, and reactive to light.   Neck:      Thyroid: No  thyromegaly.      Vascular: No JVD.   Cardiovascular:      Rate and Rhythm: Normal rate and regular rhythm.      Heart sounds: Normal heart sounds. No friction rub. No gallop.    Pulmonary:      Effort: Pulmonary effort is normal.      Breath sounds: Rhonchi present. No wheezing or rales.   Abdominal:      General: Bowel sounds are normal. There is no distension.      Palpations: Abdomen is soft. There is no mass.      Tenderness: There is no abdominal tenderness. There is no guarding or rebound.   Musculoskeletal:         General: No tenderness. Normal range of motion.      Cervical back: Normal range of motion and neck supple.   Lymphadenopathy:      Cervical: No cervical adenopathy.   Skin:     General: Skin is warm and dry.   Neurological:      Mental Status: She is alert and oriented to person, place, and time.      Cranial Nerves: No cranial nerve deficit.   Psychiatric:         Behavior: Behavior normal.         Fluids    Intake/Output Summary (Last 24 hours) at 4/17/2021 1348  Last data filed at 4/17/2021 1200  Gross per 24 hour   Intake 1100 ml   Output 1850 ml   Net -750 ml       Laboratory  Recent Labs     04/15/21  1648 04/16/21  0500 04/17/21  0805   WBC 5.7 9.1 8.9   RBC 3.89* 3.61* 4.23   HEMOGLOBIN 11.5* 10.7* 12.3   HEMATOCRIT 36.4* 32.8* 39.4   MCV 93.6 90.9 93.1   MCH 29.6 29.6 29.1   MCHC 31.6* 32.6* 31.2*   RDW 48.8 47.1 49.1   PLATELETCT 182 195 218   MPV 12.1 11.7 11.8     Recent Labs     04/15/21  2005 04/15/21  2302 04/17/21  0805   SODIUM 139 138 137   POTASSIUM 4.7 4.4 4.4   CHLORIDE 106 105 104   CO2 19* 18* 24   GLUCOSE 166* 170* 142*   BUN 7* 7* 21   CREATININE 0.55 0.53 0.72   CALCIUM 9.1 9.0 9.1     Recent Labs     04/15/21  1648   INR 1.00               Imaging  IR-MIDLINE CATHETER INSERTION WO GUIDANCE > AGE 3   Final Result                  Ultrasound-guided midline placement performed by qualified nursing staff    as above.          CT-ABDOMEN-PELVIS WITH   Final Result      1.   There is no acute inflammatory process in the abdomen or pelvis.   2.  There is no bowel obstruction.   3.  There is hepatomegaly with hepatic steatosis.   4.  There is a mildly enlarged spleen, possibly related to body habitus.      Findings were discussed with RAFFI ROBLEDO on 4/15/2021 at 4:26 PM.      DX-CHEST-PORTABLE (1 VIEW)   Final Result      1.  Minimal linear atelectasis or fibrotic change in the left lower lobe.      2.  Otherwise clear chest.      DX-CHEST-PORTABLE (1 VIEW)   Final Result         1.  Slight hazy left lung base opacity suggests infiltrate   2.  Perihilar interstitial prominence and bronchial wall cuffing, appearance suggests changes of underlying bronchial inflammation, consider bronchitis.           Assessment/Plan  * Lactic acidosis  Assessment & Plan  Presented in acute asthma exacerbation, was found to have lactic acidosis, with progressive elevation to 6.6. Afebrile, unlikely infectious source        Asthma exacerbation- (present on admission)  Assessment & Plan  Patient is significantly improved, continue respiratory protocol, inhaled steroids, bronchodilators    Sepsis (HCC)  Assessment & Plan  This diagnosis was not supported after work-up    Hypokalemia  Assessment & Plan  Replace and recheck    TONYA (obstructive sleep apnea)  Assessment & Plan  History of TONYA, related to BMI >30. Not using CPAP.  -PCP follow up for sleep study referral    Diabetes mellitus type 2 in obese (HCC)- (present on admission)  Assessment & Plan  History of PCOS, DM2 on trulicity. Last A1c 4.6 4 months prior.  -SSI  -Hold trulicity      Hyperglycemia  Assessment & Plan  In conjunction with diabetes 2    Atrial fibrillation (HCC)  Assessment & Plan  History of atrial fibrillation, currently on ivabradine, metoprolol.  -Contine home ivabradine 7.5mg PO BID  -continue metoprolol XL 12.5mg PO qD    Blood per rectum  Assessment & Plan  Reports maroon stools for past 2-3 weeks. External/internal  hemorrhoid, without rectal mass, no brandon bleeding, scant stool in rectal vault, guaiac positive. CT abd/pelvis without intraabdominal findings, diverticuli. Hgb, hemodynamically stable.   Monitor, possibly deserves outpatient follow-up    Schizoaffective disorder, depressive type (HCC)- (present on admission)  Assessment & Plan  Chronic, continue medication regimen    Immunocompromised (HCC)- (present on admission)  Assessment & Plan  Taking cellcept for optic neuritis.  -continue cellcept 1000mg PO BID  -PCP/neurology follow up for possible reduced dose/cessation    Hypothyroidism- (present on admission)  Assessment & Plan  History of hypothyroid. TSH 1.2 3 months ago.  -continue synthroid 100mcg PO qD    GERD (gastroesophageal reflux disease)- (present on admission)  Assessment & Plan  Continue PPI    Morbidly obese (HCC)- (present on admission)  Assessment & Plan  Chronic, weight loss would be favorable  Plan  Continue respiratory treatment  Moderate pain control, would not opt for escalation of therapy  Wean steroids  Medically complex high risk patient  A.m. laboratory follow-up  See orders  Discharge planning    VTE prophylaxis: Lovenox  I have performed a physical exam and reviewed and updated ROS and Plan today . In review of yesterday's note , there are no changes except as documented above.        Please note that this dictation was created using voice recognition software. I have made every reasonable attempt to correct obvious errors, but I expect that there are errors of grammar and possibly context that I did not discover before finalizing the note.

## 2021-04-18 LAB
ALBUMIN SERPL BCP-MCNC: 3.8 G/DL (ref 3.2–4.9)
ALBUMIN/GLOB SERPL: 2 G/DL
ALP SERPL-CCNC: 78 U/L (ref 30–99)
ALT SERPL-CCNC: 15 U/L (ref 2–50)
ANION GAP SERPL CALC-SCNC: 7 MMOL/L (ref 7–16)
AST SERPL-CCNC: 11 U/L (ref 12–45)
BACTERIA BLD CULT: NORMAL
BASOPHILS # BLD AUTO: 0.1 % (ref 0–1.8)
BASOPHILS # BLD: 0.01 K/UL (ref 0–0.12)
BILIRUB SERPL-MCNC: 0.2 MG/DL (ref 0.1–1.5)
BUN SERPL-MCNC: 23 MG/DL (ref 8–22)
CALCIUM SERPL-MCNC: 8.7 MG/DL (ref 8.5–10.5)
CHLORIDE SERPL-SCNC: 102 MMOL/L (ref 96–112)
CO2 SERPL-SCNC: 25 MMOL/L (ref 20–33)
CREAT SERPL-MCNC: 0.74 MG/DL (ref 0.5–1.4)
EKG IMPRESSION: NORMAL
EOSINOPHIL # BLD AUTO: 0 K/UL (ref 0–0.51)
EOSINOPHIL NFR BLD: 0 % (ref 0–6.9)
ERYTHROCYTE [DISTWIDTH] IN BLOOD BY AUTOMATED COUNT: 47.8 FL (ref 35.9–50)
GLOBULIN SER CALC-MCNC: 1.9 G/DL (ref 1.9–3.5)
GLUCOSE BLD-MCNC: 108 MG/DL (ref 65–99)
GLUCOSE BLD-MCNC: 115 MG/DL (ref 65–99)
GLUCOSE BLD-MCNC: 133 MG/DL (ref 65–99)
GLUCOSE BLD-MCNC: 143 MG/DL (ref 65–99)
GLUCOSE SERPL-MCNC: 141 MG/DL (ref 65–99)
HCT VFR BLD AUTO: 38.2 % (ref 37–47)
HEMOCCULT STL QL: POSITIVE
HGB BLD-MCNC: 12.1 G/DL (ref 12–16)
IMM GRANULOCYTES # BLD AUTO: 0.09 K/UL (ref 0–0.11)
IMM GRANULOCYTES NFR BLD AUTO: 0.9 % (ref 0–0.9)
LYMPHOCYTES # BLD AUTO: 1.14 K/UL (ref 1–4.8)
LYMPHOCYTES NFR BLD: 11.8 % (ref 22–41)
MAGNESIUM SERPL-MCNC: 2.4 MG/DL (ref 1.5–2.5)
MCH RBC QN AUTO: 29.4 PG (ref 27–33)
MCHC RBC AUTO-ENTMCNC: 31.7 G/DL (ref 33.6–35)
MCV RBC AUTO: 92.7 FL (ref 81.4–97.8)
MONOCYTES # BLD AUTO: 0.45 K/UL (ref 0–0.85)
MONOCYTES NFR BLD AUTO: 4.7 % (ref 0–13.4)
NEUTROPHILS # BLD AUTO: 7.96 K/UL (ref 2–7.15)
NEUTROPHILS NFR BLD: 82.5 % (ref 44–72)
NRBC # BLD AUTO: 0 K/UL
NRBC BLD-RTO: 0 /100 WBC
PHOSPHATE SERPL-MCNC: 3.9 MG/DL (ref 2.5–4.5)
PLATELET # BLD AUTO: 219 K/UL (ref 164–446)
PMV BLD AUTO: 12.1 FL (ref 9–12.9)
POTASSIUM SERPL-SCNC: 4.4 MMOL/L (ref 3.6–5.5)
PROT SERPL-MCNC: 5.7 G/DL (ref 6–8.2)
RBC # BLD AUTO: 4.12 M/UL (ref 4.2–5.4)
SIGNIFICANT IND 70042: NORMAL
SITE SITE: NORMAL
SODIUM SERPL-SCNC: 134 MMOL/L (ref 135–145)
SOURCE SOURCE: NORMAL
WBC # BLD AUTO: 9.7 K/UL (ref 4.8–10.8)

## 2021-04-18 PROCEDURE — 82272 OCCULT BLD FECES 1-3 TESTS: CPT

## 2021-04-18 PROCEDURE — 700102 HCHG RX REV CODE 250 W/ 637 OVERRIDE(OP): Performed by: HOSPITALIST

## 2021-04-18 PROCEDURE — 770001 HCHG ROOM/CARE - MED/SURG/GYN PRIV*

## 2021-04-18 PROCEDURE — 700102 HCHG RX REV CODE 250 W/ 637 OVERRIDE(OP): Performed by: INTERNAL MEDICINE

## 2021-04-18 PROCEDURE — 700101 HCHG RX REV CODE 250: Performed by: STUDENT IN AN ORGANIZED HEALTH CARE EDUCATION/TRAINING PROGRAM

## 2021-04-18 PROCEDURE — A9270 NON-COVERED ITEM OR SERVICE: HCPCS | Performed by: STUDENT IN AN ORGANIZED HEALTH CARE EDUCATION/TRAINING PROGRAM

## 2021-04-18 PROCEDURE — 93005 ELECTROCARDIOGRAM TRACING: CPT | Performed by: HOSPITALIST

## 2021-04-18 PROCEDURE — 83735 ASSAY OF MAGNESIUM: CPT

## 2021-04-18 PROCEDURE — 80053 COMPREHEN METABOLIC PANEL: CPT

## 2021-04-18 PROCEDURE — A9270 NON-COVERED ITEM OR SERVICE: HCPCS | Performed by: INTERNAL MEDICINE

## 2021-04-18 PROCEDURE — 94640 AIRWAY INHALATION TREATMENT: CPT

## 2021-04-18 PROCEDURE — A9270 NON-COVERED ITEM OR SERVICE: HCPCS | Performed by: HOSPITALIST

## 2021-04-18 PROCEDURE — 700102 HCHG RX REV CODE 250 W/ 637 OVERRIDE(OP): Performed by: STUDENT IN AN ORGANIZED HEALTH CARE EDUCATION/TRAINING PROGRAM

## 2021-04-18 PROCEDURE — 700111 HCHG RX REV CODE 636 W/ 250 OVERRIDE (IP): Performed by: EMERGENCY MEDICINE

## 2021-04-18 PROCEDURE — 99232 SBSQ HOSP IP/OBS MODERATE 35: CPT | Performed by: HOSPITALIST

## 2021-04-18 PROCEDURE — 93010 ELECTROCARDIOGRAM REPORT: CPT | Performed by: INTERNAL MEDICINE

## 2021-04-18 PROCEDURE — 700111 HCHG RX REV CODE 636 W/ 250 OVERRIDE (IP): Performed by: INTERNAL MEDICINE

## 2021-04-18 PROCEDURE — 82962 GLUCOSE BLOOD TEST: CPT | Mod: 91

## 2021-04-18 PROCEDURE — 84100 ASSAY OF PHOSPHORUS: CPT

## 2021-04-18 PROCEDURE — 85025 COMPLETE CBC W/AUTO DIFF WBC: CPT

## 2021-04-18 PROCEDURE — 700111 HCHG RX REV CODE 636 W/ 250 OVERRIDE (IP): Performed by: HOSPITALIST

## 2021-04-18 RX ORDER — PREDNISONE 10 MG/1
10 TABLET ORAL DAILY
Status: DISCONTINUED | OUTPATIENT
Start: 2021-04-18 | End: 2021-04-19 | Stop reason: HOSPADM

## 2021-04-18 RX ORDER — IBUPROFEN 400 MG/1
400 TABLET ORAL EVERY 6 HOURS PRN
Status: DISCONTINUED | OUTPATIENT
Start: 2021-04-18 | End: 2021-04-19 | Stop reason: HOSPADM

## 2021-04-18 RX ORDER — FERROUS SULFATE 325(65) MG
325 TABLET ORAL
Status: DISCONTINUED | OUTPATIENT
Start: 2021-04-19 | End: 2021-04-19 | Stop reason: HOSPADM

## 2021-04-18 RX ORDER — ACETAMINOPHEN 500 MG
500 TABLET ORAL 3 TIMES DAILY
Status: DISCONTINUED | OUTPATIENT
Start: 2021-04-18 | End: 2021-04-19 | Stop reason: HOSPADM

## 2021-04-18 RX ADMIN — OXYCODONE HYDROCHLORIDE 5 MG: 5 TABLET ORAL at 14:49

## 2021-04-18 RX ADMIN — FLUOXETINE 40 MG: 20 CAPSULE ORAL at 05:20

## 2021-04-18 RX ADMIN — PREGABALIN 300 MG: 150 CAPSULE ORAL at 08:53

## 2021-04-18 RX ADMIN — ONDANSETRON 4 MG: 4 TABLET, ORALLY DISINTEGRATING ORAL at 11:33

## 2021-04-18 RX ADMIN — OMEPRAZOLE 20 MG: 20 CAPSULE, DELAYED RELEASE ORAL at 05:21

## 2021-04-18 RX ADMIN — BUSPIRONE HYDROCHLORIDE 10 MG: 10 TABLET ORAL at 17:37

## 2021-04-18 RX ADMIN — MONTELUKAST 10 MG: 10 TABLET, FILM COATED ORAL at 22:22

## 2021-04-18 RX ADMIN — LIDOCAINE HYDROCHLORIDE 30 ML: 20 SOLUTION OROPHARYNGEAL at 12:25

## 2021-04-18 RX ADMIN — OXYBUTYNIN CHLORIDE 5 MG: 5 TABLET ORAL at 17:41

## 2021-04-18 RX ADMIN — DOCUSATE SODIUM 50 MG AND SENNOSIDES 8.6 MG 2 TABLET: 8.6; 5 TABLET, FILM COATED ORAL at 05:19

## 2021-04-18 RX ADMIN — IVABRADINE 7.5 MG: 7.5 TABLET, FILM COATED ORAL at 17:38

## 2021-04-18 RX ADMIN — ACETAMINOPHEN 500 MG: 500 TABLET ORAL at 11:34

## 2021-04-18 RX ADMIN — LEVOTHYROXINE SODIUM 100 MCG: 0.1 TABLET ORAL at 05:20

## 2021-04-18 RX ADMIN — TOPIRAMATE 50 MG: 100 TABLET, FILM COATED ORAL at 05:20

## 2021-04-18 RX ADMIN — ENOXAPARIN SODIUM 40 MG: 40 INJECTION SUBCUTANEOUS at 05:21

## 2021-04-18 RX ADMIN — ZIPRASIDONE HYDROCHLORIDE 40 MG: 40 CAPSULE ORAL at 05:23

## 2021-04-18 RX ADMIN — DOCUSATE SODIUM 50 MG AND SENNOSIDES 8.6 MG 2 TABLET: 8.6; 5 TABLET, FILM COATED ORAL at 17:41

## 2021-04-18 RX ADMIN — BACLOFEN 10 MG: 10 TABLET ORAL at 17:37

## 2021-04-18 RX ADMIN — BUSPIRONE HYDROCHLORIDE 10 MG: 10 TABLET ORAL at 05:19

## 2021-04-18 RX ADMIN — Medication 81 MG: at 05:21

## 2021-04-18 RX ADMIN — PREGABALIN 300 MG: 150 CAPSULE ORAL at 22:22

## 2021-04-18 RX ADMIN — METOPROLOL SUCCINATE 12.5 MG: 25 TABLET, EXTENDED RELEASE ORAL at 17:39

## 2021-04-18 RX ADMIN — BUSPIRONE HYDROCHLORIDE 10 MG: 10 TABLET ORAL at 11:34

## 2021-04-18 RX ADMIN — ACETAMINOPHEN 1000 MG: 500 TABLET, FILM COATED ORAL at 06:35

## 2021-04-18 RX ADMIN — OXCARBAZEPINE 300 MG: 300 TABLET, FILM COATED ORAL at 17:41

## 2021-04-18 RX ADMIN — BACLOFEN 10 MG: 10 TABLET ORAL at 05:21

## 2021-04-18 RX ADMIN — BUDESONIDE AND FORMOTEROL FUMARATE DIHYDRATE 2 PUFF: 80; 4.5 AEROSOL RESPIRATORY (INHALATION) at 23:02

## 2021-04-18 RX ADMIN — OXCARBAZEPINE 300 MG: 300 TABLET, FILM COATED ORAL at 05:23

## 2021-04-18 RX ADMIN — OXYBUTYNIN CHLORIDE 5 MG: 5 TABLET ORAL at 05:22

## 2021-04-18 RX ADMIN — TRAZODONE HYDROCHLORIDE 100 MG: 100 TABLET ORAL at 22:21

## 2021-04-18 RX ADMIN — BUDESONIDE AND FORMOTEROL FUMARATE DIHYDRATE 2 PUFF: 80; 4.5 AEROSOL RESPIRATORY (INHALATION) at 06:48

## 2021-04-18 RX ADMIN — KETOROLAC TROMETHAMINE 30 MG: 30 INJECTION, SOLUTION INTRAMUSCULAR; INTRAVENOUS at 00:39

## 2021-04-18 RX ADMIN — OXYCODONE HYDROCHLORIDE 5 MG: 5 TABLET ORAL at 08:49

## 2021-04-18 RX ADMIN — ZIPRASIDONE HYDROCHLORIDE 40 MG: 40 CAPSULE ORAL at 17:42

## 2021-04-18 RX ADMIN — MYCOPHENOLATE MOFETIL 500 MG: 250 CAPSULE ORAL at 17:40

## 2021-04-18 RX ADMIN — MYCOPHENOLATE MOFETIL 500 MG: 250 CAPSULE ORAL at 05:22

## 2021-04-18 RX ADMIN — ACETAMINOPHEN 500 MG: 500 TABLET ORAL at 17:37

## 2021-04-18 RX ADMIN — METHYLPREDNISOLONE SODIUM SUCCINATE 62.5 MG: 125 INJECTION, POWDER, FOR SOLUTION INTRAMUSCULAR; INTRAVENOUS at 05:19

## 2021-04-18 RX ADMIN — OXYCODONE HYDROCHLORIDE 5 MG: 5 TABLET ORAL at 03:02

## 2021-04-18 RX ADMIN — BACLOFEN 10 MG: 10 TABLET ORAL at 11:34

## 2021-04-18 RX ADMIN — PREDNISONE 10 MG: 10 TABLET ORAL at 08:50

## 2021-04-18 RX ADMIN — IVABRADINE 7.5 MG: 7.5 TABLET, FILM COATED ORAL at 07:49

## 2021-04-18 RX ADMIN — ENOXAPARIN SODIUM 40 MG: 40 INJECTION SUBCUTANEOUS at 17:38

## 2021-04-18 RX ADMIN — AZITHROMYCIN MONOHYDRATE 500 MG: 250 TABLET ORAL at 05:20

## 2021-04-18 RX ADMIN — TOPIRAMATE 50 MG: 100 TABLET, FILM COATED ORAL at 17:42

## 2021-04-18 ASSESSMENT — ENCOUNTER SYMPTOMS
NAUSEA: 0
COUGH: 1
BLOOD IN STOOL: 1
WEAKNESS: 0
VOMITING: 0
HEARTBURN: 0
POLYDIPSIA: 0
WHEEZING: 0
FOCAL WEAKNESS: 0
CARDIOVASCULAR NEGATIVE: 1
DIZZINESS: 0
MUSCULOSKELETAL NEGATIVE: 1
PALPITATIONS: 0
SPUTUM PRODUCTION: 1
DEPRESSION: 0
BRUISES/BLEEDS EASILY: 0
HEADACHES: 0
FEVER: 0
EYES NEGATIVE: 1
NEUROLOGICAL NEGATIVE: 1
ABDOMINAL PAIN: 1
CHILLS: 0
NERVOUS/ANXIOUS: 1
NECK PAIN: 0
BACK PAIN: 0

## 2021-04-18 ASSESSMENT — LIFESTYLE VARIABLES
EVER HAD A DRINK FIRST THING IN THE MORNING TO STEADY YOUR NERVES TO GET RID OF A HANGOVER: NO
HAVE PEOPLE ANNOYED YOU BY CRITICIZING YOUR DRINKING: NO
TOTAL SCORE: 0
HAVE YOU EVER FELT YOU SHOULD CUT DOWN ON YOUR DRINKING: NO
ON A TYPICAL DAY WHEN YOU DRINK ALCOHOL HOW MANY DRINKS DO YOU HAVE: 0
AVERAGE NUMBER OF DAYS PER WEEK YOU HAVE A DRINK CONTAINING ALCOHOL: 0
ALCOHOL_USE: NO
DOES PATIENT WANT TO STOP DRINKING: NO
HOW MANY TIMES IN THE PAST YEAR HAVE YOU HAD 5 OR MORE DRINKS IN A DAY: 0
EVER FELT BAD OR GUILTY ABOUT YOUR DRINKING: NO
TOTAL SCORE: 0
TOTAL SCORE: 0
CONSUMPTION TOTAL: NEGATIVE

## 2021-04-18 ASSESSMENT — COGNITIVE AND FUNCTIONAL STATUS - GENERAL
DAILY ACTIVITIY SCORE: 21
CLIMB 3 TO 5 STEPS WITH RAILING: A LOT
TURNING FROM BACK TO SIDE WHILE IN FLAT BAD: A LITTLE
SUGGESTED CMS G CODE MODIFIER DAILY ACTIVITY: CJ
MOVING FROM LYING ON BACK TO SITTING ON SIDE OF FLAT BED: A LITTLE
SUGGESTED CMS G CODE MODIFIER MOBILITY: CK
WALKING IN HOSPITAL ROOM: A LITTLE
MOVING TO AND FROM BED TO CHAIR: A LITTLE
WALKING IN HOSPITAL ROOM: A LITTLE
MOVING TO AND FROM BED TO CHAIR: A LITTLE
HELP NEEDED FOR BATHING: A LITTLE
MOBILITY SCORE: 17
TURNING FROM BACK TO SIDE WHILE IN FLAT BAD: A LITTLE
DRESSING REGULAR UPPER BODY CLOTHING: A LITTLE
MOVING FROM LYING ON BACK TO SITTING ON SIDE OF FLAT BED: A LITTLE
MOBILITY SCORE: 17
STANDING UP FROM CHAIR USING ARMS: A LITTLE
DRESSING REGULAR LOWER BODY CLOTHING: A LITTLE
STANDING UP FROM CHAIR USING ARMS: A LITTLE
SUGGESTED CMS G CODE MODIFIER MOBILITY: CK
CLIMB 3 TO 5 STEPS WITH RAILING: A LOT

## 2021-04-18 ASSESSMENT — PAIN DESCRIPTION - PAIN TYPE
TYPE: CHRONIC PAIN
TYPE: ACUTE PAIN
TYPE: CHRONIC PAIN;ACUTE PAIN
TYPE: CHRONIC PAIN
TYPE: CHRONIC PAIN
TYPE: ACUTE PAIN;CHRONIC PAIN

## 2021-04-18 ASSESSMENT — FIBROSIS 4 INDEX: FIB4 SCORE: 0.4

## 2021-04-18 NOTE — PROGRESS NOTES
"At 1140 patient described \"deep chest discomfort\" as she had one day ago accompanied with nausea. Vital Sign: T 97.6, HR 68, Res 18, /58, 97% on 2 liters nasal canula. Dr. Rivas notified. A STAT EKG was ordered. EKG tech bedside at 1153.     EKG results: SR. Dr. Rivas notified who ordered a GI Cocktail.   "

## 2021-04-18 NOTE — PROGRESS NOTES
Hospital Medicine Daily Progress Note    Date of Service  4/18/2021    Chief Complaint  30 y.o. female admitted 4/15/2021 with shortness of breath    Hospital Course  30-year-old with a complicated past history including asthma, type 2 diabetes, history of A. fib/SVT s/p ablation, chronic urinary retention, bowel bladder incontinence s/p spinal cord stimulator, history of sacral neuromodulation 3/21, on CellCept for optic neuritis, hypothyroidism, sleep apnea on CPAP, benign intracranial hypertension, peripheral neuropathy, schizoaffective disorder, PTSD, GERD, presented with progressive cough over approximately 5 days, she had also a fever reported of 100 °F, treated outpatient with prednisone and azithromycin, came to the emergency room and was given duo nebs and prednisone, return back to the hospital with worsening symptoms per her report, reporting maroon-colored stools for 1 week, abdominal pain with a chronic ovarian cysts that was planned to be treated outpatient, initially elevated lactic acid which then increased further, negative respiratory panel including Covid influenza RSV, chest x-ray with left lung base opacity/bronchitis, the patient did not have respiratory failure, her work of breathing was not increased, infectious disease was consulted, the patient transferred to ICU secondary to increasing lactic acid levels.    Interval Problem Update  Patient seen and examined today. ICU Care  Care and plan discussed in IDT/Hot rounds.  Lines and assistive devices reviewed.    Patient tolerating treatment and therapies.  All Data, Medication data reviewed.  Case discussed with nursing as available.  Plan of Care reviewed with patient and notified of changes.  4/17 intensivist with a request to transfer care to the medical unit staff, the patient overall improved, her increased lactic acid level likely secondary to pulmonary condition not sepsis, infectious disease stopping antibiotics in light of overall  improvement and no definitive source, the patient is awake, alert and x4, afebrile, heart rate in the 60s, on 2 L oxygen, normotensive, lactic acid is down to 1.8, her CBC essentially unremarkable except for some neutrophilia, glucose 142, chemistry otherwise normal, the patient is asking for more abdominal pain medication and she is questioning if she should be back on antibiotics because she is expectorating colored sputum.  4/18 the patient states that she does not feel well still, still significant amount of abdominal pain, right lower quadrant pain, no fever, no chills, hemoglobin stable, small amount of occult blood noted on testing, still expectorating colored sputum, no wheezing.  Consultants/Specialty  ID  Intensivist  Code Status  Full Code    Disposition  To medical unit    Review of Systems  Review of Systems   Constitutional: Positive for malaise/fatigue. Negative for chills and fever.   HENT: Negative.    Eyes: Negative.    Respiratory: Positive for cough and sputum production. Negative for wheezing.    Cardiovascular: Negative.  Negative for chest pain and palpitations.   Gastrointestinal: Positive for abdominal pain and blood in stool. Negative for heartburn, nausea and vomiting.   Genitourinary: Negative.  Negative for dysuria and frequency.   Musculoskeletal: Negative.  Negative for back pain and neck pain.   Skin: Negative.  Negative for itching and rash.   Neurological: Negative.  Negative for dizziness, focal weakness, weakness and headaches.   Endo/Heme/Allergies: Negative.  Negative for polydipsia. Does not bruise/bleed easily.   Psychiatric/Behavioral: Negative for depression. The patient is nervous/anxious.         Physical Exam  Temp:  [36.1 °C (96.9 °F)-36.2 °C (97.1 °F)] 36.2 °C (97.1 °F)  Pulse:  [61-76] 69  Resp:  [18] 18  BP: (104-134)/(42-81) 114/49  SpO2:  [92 %-99 %] 98 %    Physical Exam  Vitals and nursing note reviewed.   Constitutional:       Appearance: She is well-developed.  She is morbidly obese. She is not diaphoretic.   HENT:      Head: Normocephalic and atraumatic.      Nose: Nose normal.   Eyes:      Conjunctiva/sclera: Conjunctivae normal.      Pupils: Pupils are equal, round, and reactive to light.   Neck:      Thyroid: No thyromegaly.      Vascular: No JVD.   Cardiovascular:      Rate and Rhythm: Normal rate and regular rhythm.      Heart sounds: Normal heart sounds. No friction rub. No gallop.    Pulmonary:      Effort: Pulmonary effort is normal.      Breath sounds: Rhonchi present. No wheezing or rales.   Abdominal:      General: Bowel sounds are normal. There is no distension.      Palpations: Abdomen is soft. There is no mass.      Tenderness: There is no abdominal tenderness. There is no guarding or rebound.   Musculoskeletal:         General: No tenderness. Normal range of motion.      Cervical back: Normal range of motion and neck supple.   Lymphadenopathy:      Cervical: No cervical adenopathy.   Skin:     General: Skin is warm and dry.   Neurological:      Mental Status: She is alert and oriented to person, place, and time.      Cranial Nerves: No cranial nerve deficit.   Psychiatric:         Behavior: Behavior normal.         Fluids    Intake/Output Summary (Last 24 hours) at 4/18/2021 1137  Last data filed at 4/17/2021 1800  Gross per 24 hour   Intake 840 ml   Output --   Net 840 ml       Laboratory  Recent Labs     04/16/21  0500 04/17/21  0805 04/18/21  0305   WBC 9.1 8.9 9.7   RBC 3.61* 4.23 4.12*   HEMOGLOBIN 10.7* 12.3 12.1   HEMATOCRIT 32.8* 39.4 38.2   MCV 90.9 93.1 92.7   MCH 29.6 29.1 29.4   MCHC 32.6* 31.2* 31.7*   RDW 47.1 49.1 47.8   PLATELETCT 195 218 219   MPV 11.7 11.8 12.1     Recent Labs     04/15/21  2302 04/17/21  0805 04/18/21  0305   SODIUM 138 137 134*   POTASSIUM 4.4 4.4 4.4   CHLORIDE 105 104 102   CO2 18* 24 25   GLUCOSE 170* 142* 141*   BUN 7* 21 23*   CREATININE 0.53 0.72 0.74   CALCIUM 9.0 9.1 8.7     Recent Labs     04/15/21  1648   INR  1.00               Imaging  IR-MIDLINE CATHETER INSERTION WO GUIDANCE > AGE 3   Final Result                  Ultrasound-guided midline placement performed by qualified nursing staff    as above.          CT-ABDOMEN-PELVIS WITH   Final Result      1.  There is no acute inflammatory process in the abdomen or pelvis.   2.  There is no bowel obstruction.   3.  There is hepatomegaly with hepatic steatosis.   4.  There is a mildly enlarged spleen, possibly related to body habitus.      Findings were discussed with RAFFI ROBLEDO on 4/15/2021 at 4:26 PM.      DX-CHEST-PORTABLE (1 VIEW)   Final Result      1.  Minimal linear atelectasis or fibrotic change in the left lower lobe.      2.  Otherwise clear chest.      DX-CHEST-PORTABLE (1 VIEW)   Final Result         1.  Slight hazy left lung base opacity suggests infiltrate   2.  Perihilar interstitial prominence and bronchial wall cuffing, appearance suggests changes of underlying bronchial inflammation, consider bronchitis.           Assessment/Plan  * Lactic acidosis  Assessment & Plan  Presented in acute asthma exacerbation, was found to have lactic acidosis, with progressive elevation to 6.6. Afebrile, unlikely infectious source        Asthma exacerbation- (present on admission)  Assessment & Plan  Patient is significantly improved, continue respiratory protocol, inhaled steroids, bronchodilators    Sepsis (HCC)  Assessment & Plan  This diagnosis was not supported after work-up    Hypokalemia  Assessment & Plan  Replace and recheck    TONYA (obstructive sleep apnea)  Assessment & Plan  History of TONYA, related to BMI >30. Not using CPAP.  -PCP follow up for sleep study referral    Diabetes mellitus type 2 in obese (HCC)- (present on admission)  Assessment & Plan  History of PCOS, DM2 on trulicity. Last A1c 4.6 4 months prior.  -SSI  -Hold trulicity      Hyperglycemia  Assessment & Plan  In conjunction with diabetes 2    Atrial fibrillation (HCC)  Assessment &  Plan  History of atrial fibrillation, currently on ivabradine, metoprolol.  -Contine home ivabradine 7.5mg PO BID  -continue metoprolol XL 12.5mg PO qD    Blood per rectum  Assessment & Plan  Reports maroon stools for past 2-3 weeks. External/internal hemorrhoid, without rectal mass, no brandon bleeding, scant stool in rectal vault, guaiac positive. CT abd/pelvis without intraabdominal findings, diverticuli. Hgb, hemodynamically stable.   Monitor, possibly deserves outpatient follow-up    Schizoaffective disorder, depressive type (HCC)- (present on admission)  Assessment & Plan  Chronic, continue medication regimen    Immunocompromised (HCC)- (present on admission)  Assessment & Plan  Taking cellcept for optic neuritis.  -continue cellcept 1000mg PO BID  -PCP/neurology follow up for possible reduced dose/cessation    Hypothyroidism- (present on admission)  Assessment & Plan  History of hypothyroid. TSH 1.2 3 months ago.  -continue synthroid 100mcg PO qD    GERD (gastroesophageal reflux disease)- (present on admission)  Assessment & Plan  Continue PPI    Morbidly obese (HCC)- (present on admission)  Assessment & Plan  Chronic, weight loss would be favorable  Plan  Continue respiratory treatment, finish azithromycin, reduce steroids  Moderate pain control, would not opt for escalation of therapy  Medically complex high risk patient  A.m. laboratory follow-up  See orders  Discharge planning    VTE prophylaxis: Lovenox  I have performed a physical exam and reviewed and updated ROS and Plan today . In review of yesterday's note , there are no changes except as documented above.        Please note that this dictation was created using voice recognition software. I have made every reasonable attempt to correct obvious errors, but I expect that there are errors of grammar and possibly context that I did not discover before finalizing the note.

## 2021-04-18 NOTE — PROGRESS NOTES
Patient is c/o 10/10 RLQ abdominal pain, states it's her ovarian cyst. Pt is crying and asking for better pain control.   Pt is already given Toradol 30 mg, Tylenol, heat, ambulation, emotional support, baclofen.   Called Dr Rivas for updates. Order for a one time dose Oxycodone 5 mg PO.   Pt is medicated. Snacks and warm blankets provided.

## 2021-04-18 NOTE — CARE PLAN
Problem: Safety  Goal: Will remain free from injury  Outcome: PROGRESSING AS EXPECTED     Problem: Venous Thromboembolism (VTW)/Deep Vein Thrombosis (DVT) Prevention:  Goal: Patient will participate in Venous Thrombosis (VTE)/Deep Vein Thrombosis (DVT)Prevention Measures  Outcome: PROGRESSING AS EXPECTED     Problem: Bowel/Gastric:  Goal: Normal bowel function is maintained or improved  Outcome: PROGRESSING AS EXPECTED     Problem: Pain Management  Goal: Pain level will decrease to patient's comfort goal  Outcome: PROGRESSING AS EXPECTED     Problem: Respiratory:  Goal: Respiratory status will improve  Outcome: PROGRESSING AS EXPECTED

## 2021-04-19 VITALS
WEIGHT: 248.02 LBS | TEMPERATURE: 98 F | OXYGEN SATURATION: 95 % | BODY MASS INDEX: 41.32 KG/M2 | DIASTOLIC BLOOD PRESSURE: 60 MMHG | HEART RATE: 68 BPM | HEIGHT: 65 IN | RESPIRATION RATE: 18 BRPM | SYSTOLIC BLOOD PRESSURE: 115 MMHG

## 2021-04-19 PROBLEM — A41.9 SEPSIS (HCC): Status: RESOLVED | Noted: 2017-03-09 | Resolved: 2021-04-19

## 2021-04-19 PROBLEM — I48.91 ATRIAL FIBRILLATION (HCC): Status: RESOLVED | Noted: 2021-04-15 | Resolved: 2021-04-19

## 2021-04-19 LAB
ANION GAP SERPL CALC-SCNC: 5 MMOL/L (ref 7–16)
BASOPHILS # BLD AUTO: 0.1 % (ref 0–1.8)
BASOPHILS # BLD: 0.01 K/UL (ref 0–0.12)
BUN SERPL-MCNC: 31 MG/DL (ref 8–22)
CALCIUM SERPL-MCNC: 8.5 MG/DL (ref 8.5–10.5)
CHLORIDE SERPL-SCNC: 106 MMOL/L (ref 96–112)
CO2 SERPL-SCNC: 26 MMOL/L (ref 20–33)
CREAT SERPL-MCNC: 0.79 MG/DL (ref 0.5–1.4)
EOSINOPHIL # BLD AUTO: 0.02 K/UL (ref 0–0.51)
EOSINOPHIL NFR BLD: 0.3 % (ref 0–6.9)
ERYTHROCYTE [DISTWIDTH] IN BLOOD BY AUTOMATED COUNT: 47.1 FL (ref 35.9–50)
GLUCOSE BLD-MCNC: 103 MG/DL (ref 65–99)
GLUCOSE SERPL-MCNC: 81 MG/DL (ref 65–99)
HCT VFR BLD AUTO: 36.8 % (ref 37–47)
HGB BLD-MCNC: 11.7 G/DL (ref 12–16)
IMM GRANULOCYTES # BLD AUTO: 0.07 K/UL (ref 0–0.11)
IMM GRANULOCYTES NFR BLD AUTO: 1 % (ref 0–0.9)
LYMPHOCYTES # BLD AUTO: 2.23 K/UL (ref 1–4.8)
LYMPHOCYTES NFR BLD: 30.3 % (ref 22–41)
MAGNESIUM SERPL-MCNC: 2.2 MG/DL (ref 1.5–2.5)
MCH RBC QN AUTO: 29.3 PG (ref 27–33)
MCHC RBC AUTO-ENTMCNC: 31.8 G/DL (ref 33.6–35)
MCV RBC AUTO: 92.2 FL (ref 81.4–97.8)
MONOCYTES # BLD AUTO: 0.48 K/UL (ref 0–0.85)
MONOCYTES NFR BLD AUTO: 6.5 % (ref 0–13.4)
NEUTROPHILS # BLD AUTO: 4.55 K/UL (ref 2–7.15)
NEUTROPHILS NFR BLD: 61.8 % (ref 44–72)
NRBC # BLD AUTO: 0 K/UL
NRBC BLD-RTO: 0 /100 WBC
PHOSPHATE SERPL-MCNC: 3.7 MG/DL (ref 2.5–4.5)
PLATELET # BLD AUTO: 178 K/UL (ref 164–446)
PMV BLD AUTO: 11.6 FL (ref 9–12.9)
POTASSIUM SERPL-SCNC: 4 MMOL/L (ref 3.6–5.5)
RBC # BLD AUTO: 3.99 M/UL (ref 4.2–5.4)
SODIUM SERPL-SCNC: 137 MMOL/L (ref 135–145)
WBC # BLD AUTO: 7.4 K/UL (ref 4.8–10.8)

## 2021-04-19 PROCEDURE — 700111 HCHG RX REV CODE 636 W/ 250 OVERRIDE (IP): Performed by: INTERNAL MEDICINE

## 2021-04-19 PROCEDURE — 99239 HOSP IP/OBS DSCHRG MGMT >30: CPT | Performed by: HOSPITALIST

## 2021-04-19 PROCEDURE — 82962 GLUCOSE BLOOD TEST: CPT

## 2021-04-19 PROCEDURE — 700111 HCHG RX REV CODE 636 W/ 250 OVERRIDE (IP): Performed by: HOSPITALIST

## 2021-04-19 PROCEDURE — A9270 NON-COVERED ITEM OR SERVICE: HCPCS | Performed by: HOSPITALIST

## 2021-04-19 PROCEDURE — 83735 ASSAY OF MAGNESIUM: CPT

## 2021-04-19 PROCEDURE — 85025 COMPLETE CBC W/AUTO DIFF WBC: CPT

## 2021-04-19 PROCEDURE — 84100 ASSAY OF PHOSPHORUS: CPT

## 2021-04-19 PROCEDURE — 700102 HCHG RX REV CODE 250 W/ 637 OVERRIDE(OP): Performed by: INTERNAL MEDICINE

## 2021-04-19 PROCEDURE — 80048 BASIC METABOLIC PNL TOTAL CA: CPT

## 2021-04-19 PROCEDURE — 700102 HCHG RX REV CODE 250 W/ 637 OVERRIDE(OP): Performed by: HOSPITALIST

## 2021-04-19 PROCEDURE — 700102 HCHG RX REV CODE 250 W/ 637 OVERRIDE(OP): Performed by: STUDENT IN AN ORGANIZED HEALTH CARE EDUCATION/TRAINING PROGRAM

## 2021-04-19 PROCEDURE — A9270 NON-COVERED ITEM OR SERVICE: HCPCS | Performed by: STUDENT IN AN ORGANIZED HEALTH CARE EDUCATION/TRAINING PROGRAM

## 2021-04-19 PROCEDURE — A9270 NON-COVERED ITEM OR SERVICE: HCPCS | Performed by: INTERNAL MEDICINE

## 2021-04-19 PROCEDURE — 700101 HCHG RX REV CODE 250: Performed by: STUDENT IN AN ORGANIZED HEALTH CARE EDUCATION/TRAINING PROGRAM

## 2021-04-19 RX ORDER — PREDNISONE 10 MG/1
TABLET ORAL
Qty: 5 TABLET | Refills: 0 | Status: SHIPPED
Start: 2021-04-20 | End: 2021-04-21

## 2021-04-19 RX ORDER — IPRATROPIUM BROMIDE AND ALBUTEROL SULFATE 2.5; .5 MG/3ML; MG/3ML
3 SOLUTION RESPIRATORY (INHALATION) EVERY 4 HOURS PRN
Qty: 360 ML | Refills: 2 | Status: ON HOLD | OUTPATIENT
Start: 2021-04-19 | End: 2022-07-11 | Stop reason: SDUPTHER

## 2021-04-19 RX ORDER — FERROUS SULFATE 325(65) MG
325 TABLET ORAL
Qty: 30 TABLET | Refills: 3 | Status: SHIPPED
Start: 2021-04-20 | End: 2021-09-24

## 2021-04-19 RX ORDER — AZITHROMYCIN 250 MG/1
TABLET, FILM COATED ORAL
Qty: 4 TABLET | Refills: 0 | Status: SHIPPED | OUTPATIENT
Start: 2021-04-19 | End: 2021-04-23

## 2021-04-19 RX ORDER — IBUPROFEN 400 MG/1
400 TABLET ORAL EVERY 6 HOURS PRN
Qty: 30 TABLET | Refills: 1 | Status: ON HOLD | COMMUNITY
Start: 2021-04-19 | End: 2021-04-28

## 2021-04-19 RX ADMIN — AZITHROMYCIN MONOHYDRATE 500 MG: 250 TABLET ORAL at 06:19

## 2021-04-19 RX ADMIN — BACLOFEN 10 MG: 10 TABLET ORAL at 06:19

## 2021-04-19 RX ADMIN — LEVOTHYROXINE SODIUM 100 MCG: 0.1 TABLET ORAL at 06:19

## 2021-04-19 RX ADMIN — MYCOPHENOLATE MOFETIL 500 MG: 250 CAPSULE ORAL at 06:24

## 2021-04-19 RX ADMIN — ENOXAPARIN SODIUM 40 MG: 40 INJECTION SUBCUTANEOUS at 06:20

## 2021-04-19 RX ADMIN — OXCARBAZEPINE 300 MG: 300 TABLET, FILM COATED ORAL at 06:25

## 2021-04-19 RX ADMIN — DOCUSATE SODIUM 50 MG AND SENNOSIDES 8.6 MG 2 TABLET: 8.6; 5 TABLET, FILM COATED ORAL at 06:20

## 2021-04-19 RX ADMIN — ZIPRASIDONE HYDROCHLORIDE 40 MG: 40 CAPSULE ORAL at 06:26

## 2021-04-19 RX ADMIN — Medication 81 MG: at 06:20

## 2021-04-19 RX ADMIN — TOPIRAMATE 50 MG: 100 TABLET, FILM COATED ORAL at 06:20

## 2021-04-19 RX ADMIN — IVABRADINE 7.5 MG: 7.5 TABLET, FILM COATED ORAL at 10:40

## 2021-04-19 RX ADMIN — OXYCODONE HYDROCHLORIDE 5 MG: 5 TABLET ORAL at 10:59

## 2021-04-19 RX ADMIN — BUDESONIDE AND FORMOTEROL FUMARATE DIHYDRATE 2 PUFF: 80; 4.5 AEROSOL RESPIRATORY (INHALATION) at 08:00

## 2021-04-19 RX ADMIN — FERROUS SULFATE TAB 325 MG (65 MG ELEMENTAL FE) 325 MG: 325 (65 FE) TAB at 08:20

## 2021-04-19 RX ADMIN — OXYBUTYNIN CHLORIDE 5 MG: 5 TABLET ORAL at 06:25

## 2021-04-19 RX ADMIN — PREDNISONE 10 MG: 10 TABLET ORAL at 06:20

## 2021-04-19 RX ADMIN — OMEPRAZOLE 20 MG: 20 CAPSULE, DELAYED RELEASE ORAL at 06:19

## 2021-04-19 RX ADMIN — PREGABALIN 300 MG: 150 CAPSULE ORAL at 10:41

## 2021-04-19 RX ADMIN — FLUOXETINE 40 MG: 20 CAPSULE ORAL at 06:19

## 2021-04-19 RX ADMIN — BUSPIRONE HYDROCHLORIDE 10 MG: 10 TABLET ORAL at 06:19

## 2021-04-19 RX ADMIN — OXYCODONE HYDROCHLORIDE 5 MG: 5 TABLET ORAL at 02:57

## 2021-04-19 RX ADMIN — ACETAMINOPHEN 500 MG: 500 TABLET ORAL at 06:19

## 2021-04-19 NOTE — DISCHARGE INSTRUCTIONS
Discharge Instructions    Discharged to home by car with relative. Discharged via walking, hospital escort: Yes.  Special equipment needed: Not Applicable    Be sure to schedule a follow-up appointment with your primary care doctor or any specialists as instructed.     Discharge Plan:   Diet Plan: Discussed  Activity Level: Discussed  Confirmed Follow up Appointment: Appointment Scheduled  Confirmed Symptoms Management: Discussed  Medication Reconciliation Updated: Yes    I understand that a diet low in cholesterol, fat, and sodium is recommended for good health. Unless I have been given specific instructions below for another diet, I accept this instruction as my diet prescription.   Other diet: Diabetic    Special Instructions:   Sepsis, Diagnosis, Adult  Sepsis is a serious bodily reaction to an infection. The infection that triggers sepsis may be from a bacteria, virus, or fungus. Sepsis can result from an infection in any part of your body. Infections that commonly lead to sepsis include skin, lung, and urinary tract infections.  Sepsis is a medical emergency that must be treated right away in a hospital. In severe cases, it can lead to septic shock. Septic shock can weaken your heart and cause your blood pressure to drop. This can cause your central nervous system and your body's organs to stop working.  What are the causes?  This condition is caused by a severe reaction to infections from bacteria, viruses, or fungus. The germs that most often lead to sepsis include:  · Escherichia coli (E. coli) bacteria.  · Staphylococcus aureus (staph) bacteria.  · Some types of Streptococcus bacteria.  The most common infections affect these organs:  · The lung (pneumonia).  · The kidneys or bladder (urinary tract infection).  · The skin (cellulitis).  · The bowel, gallbladder, or pancreas.  What increases the risk?  You are more likely to develop this condition if:  · Your body's disease-fighting system (immune system)  is weakened.  · You are age 65 or older.  · You are male.  · You had surgery or you have been hospitalized.  · You have these devices inserted into your body:  ? A small, thin tube (catheter).  ? IV line.  ? Breathing tube.  ? Drainage tube.  · You are not getting enough nutrients from food (malnourished).  · You have a long-term (chronic) disease, such as cancer, lung disease, kidney disease, or diabetes.  · You are .  What are the signs or symptoms?  Symptoms of this condition may include:  · Fever.  · Chills or feeling very cold.  · Confusion or anxiety.  · Fatigue.  · Muscle aches.  · Shortness of breath.  · Nausea and vomiting.  · Urinating much less than usual.  · Fast heart rate (tachycardia).  · Rapid breathing (hyperventilation).  · Changes in skin color. Your skin may look blotchy, pale, or blue.  · Cool, clammy, or sweaty skin.  · Skin rash.  Other symptoms depend on the source of your infection.  How is this diagnosed?  This condition is diagnosed based on:  · Your symptoms.  · Your medical history.  · A physical exam.  Other tests may also be done to find out the cause of the infection and how severe the sepsis is. These tests may include:  · Blood tests.  · Urine tests.  · Swabs from other areas of your body that may have an infection. These samples may be tested (cultured) to find out what type of bacteria is causing the infection.  · Chest X-ray to check for pneumonia. Other imaging tests, such as a CT scan, may also be done.  · Lumbar puncture. This removes a small amount of the fluid that surrounds your brain and spinal cord. The fluid is then examined for infection.  How is this treated?  This condition must be treated in a hospital. Based on the cause of your infection, you may be given an antibiotic, antiviral, or antifungal medicine.  You may also receive:  · Fluids through an IV.  · Oxygen and breathing assistance.  · Medicines to increase your blood pressure.  · Kidney  dialysis. This process cleans your blood if your kidneys have failed.  · Surgery to remove infected tissue.  · Blood transfusion if needed.  · Medicine to prevent blood clots.  · Nutrients to correct imbalances in basic body function (metabolism). You may:  ? Receive important salts and minerals (electrolytes) through an IV.  ? Have your blood sugar level adjusted.  Follow these instructions at home:  Medicines    · Take over-the-counter and prescription medicines only as told by your health care provider.  · If you were prescribed an antibiotic, antiviral, or antifungal medicine, take it as told by your health care provider. Do not stop taking the medicine even if you start to feel better.  General instructions  · If you have a catheter or other indwelling device, ask to have it removed as soon as possible.  · Keep all follow-up visits as told by your health care provider. This is important.  Contact a health care provider if:  · You do not feel like you are getting better or regaining strength.  · You are having trouble coping with your recovery.  · You frequently feel tired.  · You feel worse or do not seem to get better after surgery.  · You think you may have an infection after surgery.  Get help right away if:  · You have any symptoms of sepsis.  · You have difficulty breathing.  · You have a rapid or skipping heartbeat.  · You become confused or disoriented.  · You have a high fever.  · Your skin becomes blotchy, pale, or blue.  · You have an infection that is getting worse or not getting better.  These symptoms may represent a serious problem that is an emergency. Do not wait to see if the symptoms will go away. Get medical help right away. Call your local emergency services (911 in the [place] Do not drive yourself to the hospital.  Summary  · Sepsis is a medical emergency that requires immediate treatment in a hospital.  · This condition is caused by a severe reaction to infections from bacteria, viruses,  or fungus.  · Based on the cause of your infection, you may be given an antibiotic, antiviral, or antifungal medicine.  · Treatment may also include IV fluids, breathing assistance, and kidney dialysis.  This information is not intended to replace advice given to you by your health care provider. Make sure you discuss any questions you have with your health care provider.  Document Released: 09/15/2004 Document Revised: 07/26/2019 Document Reviewed: 07/26/2019  [employer] Patient Education © 2020 [employer]      · Is patient discharged on Warfarin / Coumadin?   No     Depression / Suicide Risk    As you are discharged from this [facility] facility, it is important to learn how to keep safe from harming yourself.    Recognize the warning signs:  · Abrupt changes in personality, positive or negative- including increase in energy   · Giving away possessions  · Change in eating patterns- significant weight changes-  positive or negative  · Change in sleeping patterns- unable to sleep or sleeping all the time   · Unwillingness or inability to communicate  · Depression  · Unusual sadness, discouragement and loneliness  · Talk of wanting to die  · Neglect of personal appearance   · Rebelliousness- reckless behavior  · Withdrawal from people/activities they love  · Confusion- inability to concentrate     If you or a loved one observes any of these behaviors or has concerns about self-harm, here's what you can do:  · Talk about it- your feelings and reasons for harming yourself  · Remove any means that you might use to hurt yourself (examples: pills, rope, extension cords, firearm)  · Get professional help from the community (Mental Health, Substance Abuse, psychological counseling)  · Do not be alone:Call your Safe Contact- someone whom you trust who will be there for you.  · Call your local CRISIS HOTLINE 283-6416 or 872-034-7170  · Call your local Children's Mobile Crisis Response Team [place] (697) 878-1037  or www.[employer]  · Call the toll free National Suicide Prevention Hotlines   · National Suicide Prevention Lifeline 412-680-ENKZ (4807)  · [employer] [facility] 800-SUICIDE (721-9977)        Asthma, Adult    Asthma is a long-term (chronic) condition that causes recurrent episodes in which the airways become tight and narrow. The airways are the passages that lead from the nose and mouth down into the lungs. Asthma episodes, also called asthma attacks, can cause coughing, wheezing, shortness of breath, and chest pain. The airways can also fill with mucus. During an attack, it can be difficult to breathe. Asthma attacks can range from minor to life threatening.  Asthma cannot be cured, but medicines and lifestyle changes can help control it and treat acute attacks.  What are the causes?  This condition is believed to be caused by inherited (genetic) and environmental factors, but its exact cause is not known.  There are many things that can bring on an asthma attack or make asthma symptoms worse (triggers). Asthma triggers are different for each person. Common triggers include:  · Mold.  · Dust.  · Cigarette smoke.  · Cockroaches.  · Things that can cause allergy symptoms (allergens), such as animal dander or pollen from trees or grass.  · Air pollutants such as household , wood smoke, smog, or chemical odors.  · Cold air, weather changes, and winds (which increase molds and pollen in the air).  · Strong emotional expressions such as crying or laughing hard.  · Stress.  · Certain medicines (such as aspirin) or types of medicines (such as beta-blockers).  · Sulfites in foods and drinks. Foods and drinks that may contain sulfites include dried fruit, potato chips, and sparkling grape juice.  · Infections or inflammatory conditions such as the flu, a cold, or inflammation of the nasal membranes (rhinitis).  · Gastroesophageal reflux disease (GERD).  · Exercise or strenuous activity.  What are the signs  or symptoms?  Symptoms of this condition may occur right after asthma is triggered or many hours later. Symptoms include:  · Wheezing. This can sound like whistling when you breathe.  · Excessive nighttime or early morning coughing.  · Frequent or severe coughing with a common cold.  · Chest tightness.  · Shortness of breath.  · Tiredness (fatigue) with minimal activity.  How is this diagnosed?  This condition is diagnosed based on:  · Your medical history.  · A physical exam.  · Tests, which may include:  ? Lung function studies and pulmonary studies (spirometry). These tests can evaluate the flow of air in your lungs.  ? Allergy tests.  ? Imaging tests, such as X-rays.  How is this treated?  There is no cure for this condition, but treatment can help control your symptoms. Treatment for asthma usually involves:  · Identifying and avoiding your asthma triggers.  · Using medicines to control your symptoms. Generally, two types of medicines are used to treat asthma:  ? Controller medicines. These help prevent asthma symptoms from occurring. They are usually taken every day.  ? Fast-acting reliever or rescue medicines. These quickly relieve asthma symptoms by widening the narrow and tight airways. They are used as needed and provide short-term relief.  · Using supplemental oxygen. This may be needed during a severe episode.  · Using other medicines, such as:  ? Allergy medicines, such as antihistamines, if your asthma attacks are triggered by allergens.  ? Immune medicines (immunomodulators). These are medicines that help control the immune system.  · Creating an asthma action plan. An asthma action plan is a written plan for managing and treating your asthma attacks. This plan includes:  ? A list of your asthma triggers and how to avoid them.  ? Information about when medicines should be taken and when their dosage should be changed.  ? Instructions about using a device called a peak flow meter. A peak flow meter  measures how well the lungs are working and the severity of your asthma. It helps you monitor your condition.  Follow these instructions at home:  Controlling your home environment  Control your home environment in the following ways to help avoid triggers and prevent asthma attacks:  · Change your heating and air conditioning filter regularly.  · Limit your use of fireplaces and wood stoves.  · Get rid of pests (such as roaches and mice) and their droppings.  · Throw away plants if you see mold on them.  · Clean floors and dust surfaces regularly. Use unscented cleaning products.  · Try to have someone else vacuum for you regularly. Stay out of rooms while they are being vacuumed and for a short while afterward. If you vacuum, use a dust mask from a hardware store, a double-layered or microfilter vacuum  bag, or a vacuum  with a HEPA filter.  · Replace carpet with wood, tile, or vinyl jodi. Carpet can trap dander and dust.  · Use allergy-proof pillows, mattress covers, and box spring covers.  · Keep your bedroom a trigger-free room.  · Avoid pets and keep windows closed when allergens are in the air.  · Wash beddings every week in hot water and dry them in a dryer.  · Use blankets that are made of polyester or cotton.  · Clean bathrooms and sandi with bleach. If possible, have someone repaint the walls in these rooms with mold-resistant paint. Stay out of the rooms that are being cleaned and painted.  · Wash your hands often with soap and water. If soap and water are not available, use hand .  · Do not allow anyone to smoke in your home.  General instructions  · Take over-the-counter and prescription medicines only as told by your health care provider.  ? Speak with your health care provider if you have questions about how or when to take the medicines.  ? Make note if you are requiring more frequent dosages.  · Do not use any products that contain nicotine or tobacco, such as  cigarettes and e-cigarettes. If you need help quitting, ask your health care provider. Also, avoid being exposed to secondhand smoke.  · Use a peak flow meter as told by your health care provider. Record and keep track of the readings.  · Understand and use the asthma action plan to help minimize, or stop an asthma attack, without needing to seek medical care.  · Make sure you stay up to date on your yearly vaccinations as told by your health care provider. This may include vaccines for the flu and pneumonia.  · Avoid outdoor activities when allergen counts are high and when air quality is low.  · Wear a ski mask that covers your nose and mouth during outdoor winter activities. Exercise indoors on cold days if you can.  · Warm up before exercising, and take time for a cool-down period after exercise.  · Keep all follow-up visits as told by your health care provider. This is important.  Where to find more information  · For information about asthma, turn to the Centers for Disease Control and Prevention at www.cdc.gov/asthma/faqs.htm  · For air quality information, turn to [employer]Now at https://airnow.gov/  Contact a health care provider if:  · You have wheezing, shortness of breath, or a cough even while you are taking medicine to prevent attacks.  · The mucus you cough up (sputum) is thicker than usual.  · Your sputum changes from clear or white to yellow, green, gray, or bloody.  · Your medicines are causing side effects, such as a rash, itching, swelling, or trouble breathing.  · You need to use a reliever medicine more than 2-3 times a week.  · Your peak flow reading is still at 50-79% of your personal best after following your action plan for 1 hour.  · You have a fever.  Get help right away if:  · You are getting worse and do not respond to treatment during an asthma attack.  · You are short of breath when at rest or when doing very little physical activity.  · You have difficulty eating, drinking, or  talking.  · You have chest pain or tightness.  · You develop a fast heartbeat or palpitations.  · You have a bluish color to your lips or fingernails.  · You are light-headed or dizzy, or you faint.  · Your peak flow reading is less than 50% of your personal best.  · You feel too tired to breathe normally.  Summary  · Asthma is a long-term (chronic) condition that causes recurrent episodes in which the airways become tight and narrow. These episodes can cause coughing, wheezing, shortness of breath, and chest pain.  · Asthma cannot be cured, but medicines and lifestyle changes can help control it and treat acute attacks.  · Make sure you understand how to avoid triggers and how and when to use your medicines.  · Asthma attacks can range from minor to life threatening. Get help right away if you have an asthma attack and do not respond to treatment with your usual rescue medicines.  This information is not intended to replace advice given to you by your health care provider. Make sure you discuss any questions you have with your health care provider.  Document Released: 12/18/2006 Document Revised: 02/20/2020 Document Reviewed: 01/22/2018  [employer] Patient Education © 2020 [employer]

## 2021-04-19 NOTE — CARE PLAN
Problem: Communication  Goal: The ability to communicate needs accurately and effectively will improve  Outcome: PROGRESSING AS EXPECTED     Problem: Safety  Goal: Will remain free from injury  Outcome: PROGRESSING AS EXPECTED     Problem: Infection  Goal: Will remain free from infection  Outcome: PROGRESSING AS EXPECTED  No s/s of infection. Pt on oral azithromycin.     Problem: Urinary Elimination:  Goal: Ability to reestablish a normal urinary elimination pattern will improve  Outcome: PROGRESSING AS EXPECTED  Pt has intermittent incontinence. Calls intermittently when she has to void and ambulates to bathroom.

## 2021-04-19 NOTE — DISCHARGE SUMMARY
Discharge Summary    CHIEF COMPLAINT ON ADMISSION  Chief Complaint   Patient presents with   • Shortness of Breath   • Asthma       Reason for Admission  EMS     Admission Date  4/15/2021    CODE STATUS  Full Code    HPI & HOSPITAL COURSE  30-year-old with a complicated past history including asthma, type 2 diabetes, history of A. fib/SVT s/p ablation, chronic urinary retention, bowel bladder incontinence s/p spinal cord stimulator, history of sacral neuromodulation 3/21, on CellCept for optic neuritis, hypothyroidism, sleep apnea on CPAP, benign intracranial hypertension, peripheral neuropathy, schizoaffective disorder, PTSD, GERD, presented with progressive cough over approximately 5 days, she had also a fever reported of 100 °F, treated outpatient with prednisone and azithromycin, came to the emergency room and was given duo nebs and prednisone, return back to the hospital with worsening symptoms per her report, reporting maroon-colored stools for 1 week, abdominal pain with a chronic ovarian cysts that was planned to be treated outpatient, initially elevated lactic acid which then increased further, negative respiratory panel including Covid influenza RSV, chest x-ray with left lung base opacity/bronchitis, the patient did not have respiratory failure, her work of breathing was not increased, infectious disease was consulted.  The patient had elevated lactic acid levels likely secondary to pulmonary disease not sepsis, antibiotics were continued since the patient continued to have some purulent sputum, likely tracheobronchitis, her abdominal pain improved, the patient has firm outpatient follow-up with gynecology to address her ovarian cyst.  Her pulmonary condition improved and she has currently no audible wheezes.  The patient was found to be in a sinus rhythm on cardiac medications as previously prescribed.  Overall the patient improved as expected and is currently discharged in a medically stable  condition.      Therefore, she is discharged in fair and stable condition to home with close outpatient follow-up.    The patient met 2-midnight criteria for an inpatient stay at the time of discharge.    Discharge Date  4/19/2021    FOLLOW UP ITEMS POST DISCHARGE  Gynecology the result of ovarian cyst pain    DISCHARGE DIAGNOSES  Principal Problem:    Lactic acidosis POA: Unknown  Active Problems:    Asthma exacerbation POA: Yes    Hyperglycemia POA: Unknown    Diabetes mellitus type 2 in obese (HCC) POA: Yes    TONYA (obstructive sleep apnea) POA: Unknown    Hypokalemia POA: Unknown    Morbidly obese (HCC) POA: Yes      Overview: IMO load March 2020    GERD (gastroesophageal reflux disease) (Chronic) POA: Yes    Hypothyroidism (Chronic) POA: Yes    Immunocompromised (HCC) POA: Yes    Schizoaffective disorder, depressive type (HCC) POA: Yes    Blood per rectum POA: Unknown  Resolved Problems:    Sepsis (HCC) POA: Unknown    Atrial fibrillation (HCC) POA: Unknown      FOLLOW UP  Future Appointments   Date Time Provider Department Center   4/30/2021 10:00 AM [facility]   6/17/2021  8:20 AM John Leon M.D. [facility] None   7/7/2021 11:30 AM Kayla Wise D.O. [facility] None           As needed    Sue Preston M.D.  [address]  [address]  [facility] [address]  [phone]      As needed      MEDICATIONS ON DISCHARGE     Medication List      START taking these medications      Instructions   azithromycin 250 MG Tabs  Commonly known as: ZITHROMAX   1 tab po x 4 days then stop     ferrous sulfate 325 (65 Fe) MG tablet  Start taking on: April 20, 2021   Take 1 tablet by mouth every morning with breakfast.  Dose: 325 mg     ibuprofen 400 MG Tabs  Commonly known as: MOTRIN   Take 1 tablet by mouth every 6 hours as needed.  Dose: 400 mg        CHANGE how you take these medications      Instructions   busPIRone 30 MG tablet  What changed:   · how much to take  · how to take this  · when  to take this  · additional instructions  Commonly known as: BUSPAR   Take 1 tablet by mouth twice a day     mycophenolate 500 MG tablet  What changed: how much to take  Commonly known as: CellCept   Doctor's comments: The patient needs to get future refills from her neuro-ophthalmologist, Dr. Yousif  Take 2 Tablets by mouth 2 times a day.  Dose: 1,000 mg     Myrbetriq 50 MG Tb24  What changed: when to take this  Generic drug: Mirabegron ER   Take 50 mg by mouth every day.  Dose: 50 mg     predniSONE 10 MG Tabs  Start taking on: April 20, 2021  What changed:   · medication strength  · how much to take  · how to take this  · when to take this  · additional instructions  Commonly known as: DELTASONE   1 Tab po daily x 5 days then stop     Trulicity 1.5 MG/0.5ML Sopn  What changed: when to take this  Generic drug: Dulaglutide   Inject 0.5 mL under the skin every 7 days.  Dose: 0.5 mL     ziprasidone 40 MG Caps  What changed:   · how much to take  · how to take this  · when to take this  · additional instructions  Commonly known as: GEODON   Take 1 tablet by mouth twice a day        CONTINUE taking these medications      Instructions   albuterol 108 (90 Base) MCG/ACT Aers inhalation aerosol   Inhale 2 Puffs every 6 hours as needed for Shortness of Breath.  Dose: 2 Puff     aspirin 81 MG EC tablet   Take 81 mg by mouth every morning.  Dose: 81 mg     baclofen 10 MG Tabs  Commonly known as: LIORESAL   Take 1 tablet by mouth 3 times a day.  Dose: 10 mg     benzonatate 100 MG Caps  Commonly known as: TESSALON   Take 1 capsule by mouth 3 times a day as needed for Cough.  Dose: 100 mg     Corlanor 7.5 MG Tabs tablet  Generic drug: ivabradine   Take 1 tablet by mouth 2 times a day, with meals.  Dose: 7.5 mg     Flovent  MCG/ACT Aero  Generic drug: fluticasone   Inhale 1 Puff 2 times a day.  Dose: 1 Puff     fluoxetine 40 MG capsule  Commonly known as: PROZAC   Take 1 capsule by mouth every day.  Dose: 40 mg    "  ipratropium-albuterol 0.5-2.5 (3) MG/3ML nebulizer solution  Commonly known as: DUONEB   Take 3 mL by nebulization every four hours as needed for Shortness of Breath. Nebulizer  Dose: 3 mL     levothyroxine 100 MCG Tabs  Commonly known as: SYNTHROID   Take 100 mcg by mouth Every morning on an empty stomach.  Dose: 100 mcg     Melatonin 10 MG Tabs   Take 10 mg by mouth every bedtime.  Dose: 10 mg     metoprolol SR 25 MG Tb24  Commonly known as: TOPROL XL   Take 0.5 Tablets by mouth every evening.  Dose: 12.5 mg     OXcarbazepine 300 MG Tabs  Commonly known as: Trileptal   Take 1 tablet by mouth 2 times a day.  Dose: 300 mg     potassium chloride SA 20 MEQ Tbcr  Commonly known as: Kdur   Take 40 mEq by mouth 2 times a day. 2 tablets = 40 mEq.  Dose: 40 mEq     pregabalin 300 MG capsule  Commonly known as: Lyrica   Take 1 capsule by mouth 2 times a day for 90 days.  Dose: 300 mg     sodium bicarbonate 325 MG Tabs   Take 650 mg by mouth 2 times a day. 2 tabs = 650 mg  Dose: 650 mg     topiramate 50 MG tablet  Commonly known as: TOPAMAX   Take 50 mg by mouth 2 times a day.  Dose: 50 mg     traZODone 100 MG Tabs  Commonly known as: DESYREL   Take 1 tablet by mouth every bedtime.  Dose: 100 mg     vitamin D (Ergocalciferol) 1.25 MG (52328 UT) Caps capsule  Commonly known as: DRISDOL   Take 50,000 Units by mouth every Friday. In the morning.  Dose: 50,000 Units        STOP taking these medications    doxycycline 100 MG Tabs  Commonly known as: VIBRAMYCIN            Allergies  Allergies   Allergen Reactions   • Depakote [Divalproex Sodium] Unspecified     Muscle spasms/muscle pain and weakness     • Amitriptyline Unspecified     Headaches     • Aripiprazole [Abilify] Unspecified     Headaches/muscle twitching     • Clindamycin Nausea     Even with food     • Flagyl [Metronidazole Hcl] Unspecified     \"eye problems\"     • Flomax [Tamsulosin Hydrochloride] Swelling   • Levaquin Unspecified     Severe muscle cramps in " "legs  RXN=unknown   • Metformin Unspecified     Increased lactic acid      • Tamsulosin Swelling     Swelling of legs   • Tape Rash     Tears skin off  coban with Tegaderm tape ok intermittently  RXN=ongoing   • Vancomycin Itching     Pt becomes flushed in face and chest.   RXN=7/10/16   • Wound Dressing Adhesive Hives     By pt report   • Ampicillin Rash     Pt reports that she received a rash    • Ciprofloxacin Rash         • Keflex Rash     Pt states she gets a rash with this medication  Tolerates ceftriaxone  Can take with Benadryl   • Levofloxacin Unspecified     Leg muscle cramps   • Metronidazole Rash     \"Vision problems\"   • Penicillins Hives     Can take with Benadryl   • Sulfa Drugs Itching and Myalgia     Muscle pain and weakness   • Valproic Acid Rash     .       DIET  Orders Placed This Encounter   Procedures   • Diet Order Diet: Consistent CHO (Diabetic)     Standing Status:   Standing     Number of Occurrences:   1     Order Specific Question:   Diet:     Answer:   Consistent CHO (Diabetic) [4]   • Discontinue Diet Tray     Standing Status:   Standing     Number of Occurrences:   1       ACTIVITY  As tolerated.  Weight bearing as tolerated    CONSULTATIONS  Intensivist, infectious disease    PROCEDURES  None    LABORATORY  Lab Results   Component Value Date    SODIUM 137 04/19/2021    POTASSIUM 4.0 04/19/2021    CHLORIDE 106 04/19/2021    CO2 26 04/19/2021    GLUCOSE 81 04/19/2021    BUN 31 (H) 04/19/2021    CREATININE 0.79 04/19/2021    CREATININE 0.75 (L) 07/20/2010    GLOMRATE >59 07/20/2010        Lab Results   Component Value Date    WBC 7.4 04/19/2021    WBC 6.1 07/20/2010    HEMOGLOBIN 11.7 (L) 04/19/2021    HEMATOCRIT 36.8 (L) 04/19/2021    PLATELETCT 178 04/19/2021        Total time of the discharge process exceeds 55 minutes.  "

## 2021-04-21 ENCOUNTER — APPOINTMENT (OUTPATIENT)
Dept: RADIOLOGY | Facility: IMAGING CENTER | Age: 31
End: 2021-04-21
Attending: NURSE PRACTITIONER
Payer: MEDICARE

## 2021-04-21 ENCOUNTER — HOSPITAL ENCOUNTER (EMERGENCY)
Facility: MEDICAL CENTER | Age: 31
End: 2021-04-21
Attending: EMERGENCY MEDICINE
Payer: MEDICARE

## 2021-04-21 ENCOUNTER — HOSPITAL ENCOUNTER (OUTPATIENT)
Dept: LAB | Facility: MEDICAL CENTER | Age: 31
End: 2021-04-21
Attending: NURSE PRACTITIONER
Payer: MEDICARE

## 2021-04-21 ENCOUNTER — OFFICE VISIT (OUTPATIENT)
Dept: URGENT CARE | Facility: CLINIC | Age: 31
End: 2021-04-21
Payer: MEDICARE

## 2021-04-21 VITALS
HEIGHT: 65 IN | DIASTOLIC BLOOD PRESSURE: 74 MMHG | BODY MASS INDEX: 41.65 KG/M2 | SYSTOLIC BLOOD PRESSURE: 108 MMHG | HEART RATE: 80 BPM | WEIGHT: 250 LBS | TEMPERATURE: 97.3 F | OXYGEN SATURATION: 97 % | RESPIRATION RATE: 18 BRPM

## 2021-04-21 VITALS
HEART RATE: 83 BPM | RESPIRATION RATE: 25 BRPM | DIASTOLIC BLOOD PRESSURE: 64 MMHG | TEMPERATURE: 97.6 F | HEIGHT: 65 IN | WEIGHT: 251.32 LBS | BODY MASS INDEX: 41.87 KG/M2 | SYSTOLIC BLOOD PRESSURE: 116 MMHG | OXYGEN SATURATION: 100 %

## 2021-04-21 DIAGNOSIS — R05.9 COUGH: ICD-10-CM

## 2021-04-21 DIAGNOSIS — Z92.89 HISTORY OF RECENT HOSPITALIZATION: ICD-10-CM

## 2021-04-21 DIAGNOSIS — J40 BRONCHITIS: ICD-10-CM

## 2021-04-21 DIAGNOSIS — R07.89 LEFT-SIDED CHEST WALL PAIN: ICD-10-CM

## 2021-04-21 DIAGNOSIS — J22 LRTI (LOWER RESPIRATORY TRACT INFECTION): ICD-10-CM

## 2021-04-21 DIAGNOSIS — R42 LIGHTHEADED: ICD-10-CM

## 2021-04-21 DIAGNOSIS — R06.02 SOB (SHORTNESS OF BREATH): ICD-10-CM

## 2021-04-21 DIAGNOSIS — Z87.09 HISTORY OF BRONCHITIS: ICD-10-CM

## 2021-04-21 DIAGNOSIS — J45.51 SEVERE PERSISTENT ASTHMA WITH ACUTE EXACERBATION: ICD-10-CM

## 2021-04-21 LAB
ALBUMIN SERPL BCP-MCNC: 3.8 G/DL (ref 3.2–4.9)
ALBUMIN SERPL BCP-MCNC: 3.9 G/DL (ref 3.2–4.9)
ALBUMIN/GLOB SERPL: 1.9 G/DL
ALBUMIN/GLOB SERPL: 2.1 G/DL
ALP SERPL-CCNC: 78 U/L (ref 30–99)
ALP SERPL-CCNC: 80 U/L (ref 30–99)
ALT SERPL-CCNC: 30 U/L (ref 2–50)
ALT SERPL-CCNC: 32 U/L (ref 2–50)
ANION GAP SERPL CALC-SCNC: 10 MMOL/L (ref 7–16)
ANION GAP SERPL CALC-SCNC: 11 MMOL/L (ref 7–16)
AST SERPL-CCNC: 15 U/L (ref 12–45)
AST SERPL-CCNC: 21 U/L (ref 12–45)
BASOPHILS # BLD AUTO: 0.1 % (ref 0–1.8)
BASOPHILS # BLD AUTO: 0.4 % (ref 0–1.8)
BASOPHILS # BLD: 0.01 K/UL (ref 0–0.12)
BASOPHILS # BLD: 0.03 K/UL (ref 0–0.12)
BILIRUB SERPL-MCNC: 0.2 MG/DL (ref 0.1–1.5)
BILIRUB SERPL-MCNC: 0.3 MG/DL (ref 0.1–1.5)
BUN SERPL-MCNC: 17 MG/DL (ref 8–22)
BUN SERPL-MCNC: 22 MG/DL (ref 8–22)
CALCIUM SERPL-MCNC: 8.7 MG/DL (ref 8.5–10.5)
CALCIUM SERPL-MCNC: 8.7 MG/DL (ref 8.5–10.5)
CHLORIDE SERPL-SCNC: 107 MMOL/L (ref 96–112)
CHLORIDE SERPL-SCNC: 112 MMOL/L (ref 96–112)
CO2 SERPL-SCNC: 17 MMOL/L (ref 20–33)
CO2 SERPL-SCNC: 19 MMOL/L (ref 20–33)
CREAT SERPL-MCNC: 0.67 MG/DL (ref 0.5–1.4)
CREAT SERPL-MCNC: 0.73 MG/DL (ref 0.5–1.4)
EOSINOPHIL # BLD AUTO: 0.15 K/UL (ref 0–0.51)
EOSINOPHIL # BLD AUTO: 0.18 K/UL (ref 0–0.51)
EOSINOPHIL NFR BLD: 1.8 % (ref 0–6.9)
EOSINOPHIL NFR BLD: 2.2 % (ref 0–6.9)
ERYTHROCYTE [DISTWIDTH] IN BLOOD BY AUTOMATED COUNT: 45.4 FL (ref 35.9–50)
ERYTHROCYTE [DISTWIDTH] IN BLOOD BY AUTOMATED COUNT: 46.1 FL (ref 35.9–50)
GLOBULIN SER CALC-MCNC: 1.9 G/DL (ref 1.9–3.5)
GLOBULIN SER CALC-MCNC: 2 G/DL (ref 1.9–3.5)
GLUCOSE SERPL-MCNC: 89 MG/DL (ref 65–99)
GLUCOSE SERPL-MCNC: 93 MG/DL (ref 65–99)
HCT VFR BLD AUTO: 37.7 % (ref 37–47)
HCT VFR BLD AUTO: 37.8 % (ref 37–47)
HGB BLD-MCNC: 12.1 G/DL (ref 12–16)
HGB BLD-MCNC: 12.2 G/DL (ref 12–16)
IMM GRANULOCYTES # BLD AUTO: 0.06 K/UL (ref 0–0.11)
IMM GRANULOCYTES # BLD AUTO: 0.09 K/UL (ref 0–0.11)
IMM GRANULOCYTES NFR BLD AUTO: 0.7 % (ref 0–0.9)
IMM GRANULOCYTES NFR BLD AUTO: 1.1 % (ref 0–0.9)
LACTATE BLD-SCNC: 1.7 MMOL/L (ref 0.5–2)
LACTATE BLD-SCNC: 1.9 MMOL/L (ref 0.5–2)
LYMPHOCYTES # BLD AUTO: 1.62 K/UL (ref 1–4.8)
LYMPHOCYTES # BLD AUTO: 1.81 K/UL (ref 1–4.8)
LYMPHOCYTES NFR BLD: 19.8 % (ref 22–41)
LYMPHOCYTES NFR BLD: 22.1 % (ref 22–41)
MCH RBC QN AUTO: 29 PG (ref 27–33)
MCH RBC QN AUTO: 29 PG (ref 27–33)
MCHC RBC AUTO-ENTMCNC: 32.1 G/DL (ref 33.6–35)
MCHC RBC AUTO-ENTMCNC: 32.3 G/DL (ref 33.6–35)
MCV RBC AUTO: 90 FL (ref 81.4–97.8)
MCV RBC AUTO: 90.4 FL (ref 81.4–97.8)
MONOCYTES # BLD AUTO: 0.42 K/UL (ref 0–0.85)
MONOCYTES # BLD AUTO: 0.43 K/UL (ref 0–0.85)
MONOCYTES NFR BLD AUTO: 5.1 % (ref 0–13.4)
MONOCYTES NFR BLD AUTO: 5.2 % (ref 0–13.4)
NEUTROPHILS # BLD AUTO: 5.66 K/UL (ref 2–7.15)
NEUTROPHILS # BLD AUTO: 5.93 K/UL (ref 2–7.15)
NEUTROPHILS NFR BLD: 69.1 % (ref 44–72)
NEUTROPHILS NFR BLD: 72.4 % (ref 44–72)
NRBC # BLD AUTO: 0 K/UL
NRBC # BLD AUTO: 0 K/UL
NRBC BLD-RTO: 0 /100 WBC
NRBC BLD-RTO: 0 /100 WBC
PLATELET # BLD AUTO: 184 K/UL (ref 164–446)
PLATELET # BLD AUTO: 187 K/UL (ref 164–446)
PMV BLD AUTO: 11.1 FL (ref 9–12.9)
PMV BLD AUTO: 12.2 FL (ref 9–12.9)
POTASSIUM SERPL-SCNC: 3.4 MMOL/L (ref 3.6–5.5)
POTASSIUM SERPL-SCNC: 3.8 MMOL/L (ref 3.6–5.5)
PROCALCITONIN SERPL-MCNC: <0.05 NG/ML
PROT SERPL-MCNC: 5.8 G/DL (ref 6–8.2)
PROT SERPL-MCNC: 5.8 G/DL (ref 6–8.2)
RBC # BLD AUTO: 4.17 M/UL (ref 4.2–5.4)
RBC # BLD AUTO: 4.2 M/UL (ref 4.2–5.4)
SODIUM SERPL-SCNC: 136 MMOL/L (ref 135–145)
SODIUM SERPL-SCNC: 140 MMOL/L (ref 135–145)
WBC # BLD AUTO: 8.2 K/UL (ref 4.8–10.8)
WBC # BLD AUTO: 8.2 K/UL (ref 4.8–10.8)

## 2021-04-21 PROCEDURE — 87040 BLOOD CULTURE FOR BACTERIA: CPT | Mod: 91

## 2021-04-21 PROCEDURE — 99214 OFFICE O/P EST MOD 30 MIN: CPT | Performed by: NURSE PRACTITIONER

## 2021-04-21 PROCEDURE — 700102 HCHG RX REV CODE 250 W/ 637 OVERRIDE(OP): Performed by: EMERGENCY MEDICINE

## 2021-04-21 PROCEDURE — 84145 PROCALCITONIN (PCT): CPT

## 2021-04-21 PROCEDURE — 71046 X-RAY EXAM CHEST 2 VIEWS: CPT | Mod: TC | Performed by: NURSE PRACTITIONER

## 2021-04-21 PROCEDURE — 83605 ASSAY OF LACTIC ACID: CPT

## 2021-04-21 PROCEDURE — 85025 COMPLETE CBC W/AUTO DIFF WBC: CPT | Mod: 91

## 2021-04-21 PROCEDURE — 99284 EMERGENCY DEPT VISIT MOD MDM: CPT

## 2021-04-21 PROCEDURE — 80053 COMPREHEN METABOLIC PANEL: CPT

## 2021-04-21 PROCEDURE — 700101 HCHG RX REV CODE 250: Performed by: EMERGENCY MEDICINE

## 2021-04-21 PROCEDURE — A9270 NON-COVERED ITEM OR SERVICE: HCPCS | Performed by: EMERGENCY MEDICINE

## 2021-04-21 PROCEDURE — 87077 CULTURE AEROBIC IDENTIFY: CPT

## 2021-04-21 PROCEDURE — 85025 COMPLETE CBC W/AUTO DIFF WBC: CPT

## 2021-04-21 PROCEDURE — 87150 DNA/RNA AMPLIFIED PROBE: CPT

## 2021-04-21 PROCEDURE — 36415 COLL VENOUS BLD VENIPUNCTURE: CPT

## 2021-04-21 PROCEDURE — 80053 COMPREHEN METABOLIC PANEL: CPT | Mod: 91

## 2021-04-21 PROCEDURE — 83605 ASSAY OF LACTIC ACID: CPT | Mod: 91

## 2021-04-21 PROCEDURE — 94640 AIRWAY INHALATION TREATMENT: CPT

## 2021-04-21 RX ORDER — PREDNISONE 20 MG/1
TABLET ORAL
Qty: 11 TABLET | Refills: 0 | Status: SHIPPED
Start: 2021-04-21 | End: 2021-04-23

## 2021-04-21 RX ORDER — ONDANSETRON 2 MG/ML
INJECTION INTRAMUSCULAR; INTRAVENOUS
COMMUNITY
Start: 2021-03-10 | End: 2021-04-26

## 2021-04-21 RX ORDER — METHOCARBAMOL 750 MG/1
750 TABLET, FILM COATED ORAL EVERY 4 HOURS PRN
Status: ON HOLD | COMMUNITY
Start: 2021-03-31 | End: 2021-04-28

## 2021-04-21 RX ORDER — FLUTICASONE PROPIONATE 50 MCG
1 SPRAY, SUSPENSION (ML) NASAL
COMMUNITY
End: 2021-04-26

## 2021-04-21 RX ORDER — DIPHENHYDRAMINE HCL 25 MG
25 TABLET ORAL NIGHTLY PRN
Status: ON HOLD | COMMUNITY
Start: 2021-03-16 | End: 2021-04-28

## 2021-04-21 RX ORDER — DOXYCYCLINE 100 MG/1
CAPSULE ORAL
COMMUNITY
Start: 2021-04-12 | End: 2021-04-23

## 2021-04-21 RX ORDER — METOCLOPRAMIDE 10 MG/1
10 TABLET ORAL ONCE
Status: COMPLETED | OUTPATIENT
Start: 2021-04-21 | End: 2021-04-21

## 2021-04-21 RX ORDER — CEFAZOLIN SODIUM 1 G/3ML
INJECTION, POWDER, FOR SOLUTION INTRAMUSCULAR; INTRAVENOUS
COMMUNITY
Start: 2021-03-10 | End: 2021-04-23

## 2021-04-21 RX ORDER — METOCLOPRAMIDE 10 MG/1
10 TABLET ORAL 4 TIMES DAILY PRN
Qty: 20 TABLET | Refills: 0 | Status: ON HOLD | OUTPATIENT
Start: 2021-04-21 | End: 2021-04-28

## 2021-04-21 RX ORDER — GABAPENTIN 300 MG/1
300 CAPSULE ORAL 3 TIMES DAILY
COMMUNITY
Start: 2021-03-10 | End: 2021-05-03

## 2021-04-21 RX ORDER — DOXYCYCLINE HYCLATE 100 MG
100 TABLET ORAL 2 TIMES DAILY
Qty: 14 TABLET | Refills: 0 | Status: ON HOLD
Start: 2021-04-21 | End: 2021-04-28

## 2021-04-21 RX ORDER — FLUTICASONE PROPIONATE 50 MCG
SPRAY, SUSPENSION (ML) NASAL
COMMUNITY
Start: 2021-04-13 | End: 2021-04-26

## 2021-04-21 RX ORDER — IPRATROPIUM BROMIDE AND ALBUTEROL SULFATE 2.5; .5 MG/3ML; MG/3ML
3 SOLUTION RESPIRATORY (INHALATION)
Status: COMPLETED | OUTPATIENT
Start: 2021-04-21 | End: 2021-04-21

## 2021-04-21 RX ORDER — MIDAZOLAM HYDROCHLORIDE 2 MG/2ML
INJECTION, SOLUTION INTRAMUSCULAR; INTRAVENOUS
COMMUNITY
Start: 2021-03-10 | End: 2021-04-23

## 2021-04-21 RX ADMIN — IPRATROPIUM BROMIDE AND ALBUTEROL SULFATE 3 ML: .5; 2.5 SOLUTION RESPIRATORY (INHALATION) at 16:27

## 2021-04-21 RX ADMIN — METOCLOPRAMIDE 10 MG: 10 TABLET ORAL at 16:26

## 2021-04-21 ASSESSMENT — ENCOUNTER SYMPTOMS
DIZZINESS: 1
SHORTNESS OF BREATH: 1
FEVER: 1
COUGH: 1

## 2021-04-21 ASSESSMENT — VISUAL ACUITY: OU: 1

## 2021-04-21 ASSESSMENT — FIBROSIS 4 INDEX
FIB4 SCORE: 0.63
FIB4 SCORE: 0.49

## 2021-04-21 NOTE — PATIENT INSTRUCTIONS

## 2021-04-21 NOTE — PROGRESS NOTES
"Subjective:     Kristin Balderrama is a 30 y.o. female who presents for Cough (x 3 days post hospital for Bronchitis.  Pt. states that L lung feels flattened and she is having dizziness and low grade fever or 99.  Pt. is on Azithromycin and Prednisone 10 mg po qd. )       Shortness of Breath  This is a new problem. The problem has been gradually worsening. Associated symptoms include a fever. Her past medical history is significant for asthma.     Patient presents to urgent care with complaints of recurrent cough, fever, shortness of breath, left-sided chest wall pain, and lightheadedness.  Was discharged from the hospital 4/19/2021 with a diagnosis of bronchitis and lactic acidosis.    Records reviewed.    Patient was initially seen in urgent care on 4/12/2021.  Patient was started on doxycycline, Flovent, and Tessalon for LRTI and shortness of breath.  Chest x-ray was unremarkable.  Was started on prednisone and advised to continue with antibiotic.    Prior external notes from unique source dated 4/13/2021 reviewed:     Patient went to the ER on 4/13/2021 with worsening symptoms.  Notes reports she had a negative Covid test through a mobile medical unit.    Patient returned to the ER on 4/15/2021 with worsening symptoms.  She was admitted for suspected asthma exacerbation.  She was found to have an elevated lactic acid level and was transferred to the ICU per sepsis protocol.  She was eventually discharged on azithromycin.    Unique test result dated 4/15/2021 reviewed: chest x-ray with suggestion of left lung base infiltrate and consideration of bronchitis.     Unique test result dated 4/15/2021 reviewed: chest x-ray with \"minimal linear atelectasis or fibrotic change in the left lower lobe.\"    Patient reports she is still on prednisone and her inhalers.    Patient was screened prior to rooming and denied COVID-19 diagnosis or contact with a person who has been diagnosed or is suspected to have COVID-19. " During this visit, appropriate PPE was worn, hand hygiene was performed, and the patient and any visitors were masked.     PMH:  has a past medical history of Abdominal pain, Anginal syndrome, Apnea, sleep, Arrhythmia, Arthritis, ASTHMA, Atrial fibrillation ([facility]), Back pain, Borderline personality disorder ([facility]), Breath shortness, Bronchitis, Cardiac arrhythmia, Chickenpox, Chronic UTI (9/18/2010), COPD (chronic obstructive pulmonary disease) ([facility]), Cough, Daytime sleepiness, Dental disorder (03/08/2021), Depression, Diabetes ([facility]), Diarrhea, Disorder of thyroid, Fall, Fatigue, Frequent headaches, Gasping for breath, Gynecological disorder, Headache(784.0), Heart burn, Heart murmur, History of falling, Indigestion, Migraine, 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    MEDS:   Current Outpatient Medications:   •  predniSONE (DELTASONE) 20 MG Tab, Take 3 tabs daily x 2 days, then 2 tabs daily x 2 days, then 1 tab on final day., Disp: 11 tablet, Rfl: 0  •  doxycycline (VIBRAMYCIN) 100 MG Tab, Take 1 tablet by mouth 2 times a day for 7 days., Disp: 14 tablet, Rfl: 0  •  ipratropium-albuterol (DUONEB) 0.5-2.5 (3) MG/3ML nebulizer solution, Take 3 mL by nebulization every four hours as needed for Shortness of Breath. Nebulizer, Disp: 360 mL, Rfl: 2  •  ferrous sulfate 325 (65 Fe) MG tablet, Take 1 tablet by mouth every morning with breakfast., Disp: 30 tablet, Rfl: 3  •  ibuprofen (MOTRIN) 400 MG Tab, Take 1 tablet by mouth every 6 hours as needed., Disp: 30 tablet, Rfl: 1  •  azithromycin (ZITHROMAX) 250 MG Tab, 1  tab po x 4 days then stop, Disp: 4 tablet, Rfl: 0  •  fluticasone (FLOVENT HFA) 220 MCG/ACT Aerosol, Inhale 1 Puff 2 times a day., Disp: 1 Each, Rfl: 0  •  benzonatate (TESSALON) 100 MG Cap, Take 1 capsule by mouth 3 times a day as needed for Cough., Disp: 60 capsule, Rfl: 0  •  metoprolol SR (TOPROL XL) 25 MG TABLET SR 24 HR, Take 0.5 Tablets by mouth every evening., Disp: 30 tablet, Rfl: 1  •  baclofen (LIORESAL) 10 MG Tab, Take 1 tablet by mouth 3 times a day., Disp: 90 tablet, Rfl: 2  •  pregabalin (LYRICA) 300 MG capsule, Take 1 capsule by mouth 2 times a day for 90 days., Disp: 60 capsule, Rfl: 2  •  ivabradine (CORLANOR) 7.5 MG Tab tablet, Take 1 tablet by mouth 2 times a day, with meals., Disp: 180 tablet, Rfl: 3  •  busPIRone (BUSPAR) 30 MG tablet, Take 1 tablet by mouth twice a day (Patient taking differently: Take 10 mg by mouth 3 times a day.), Disp: 180 tablet, Rfl: 0  •  ziprasidone (GEODON) 40 MG Cap, Take 1 tablet by mouth twice a day (Patient taking differently: Take 40 mg by mouth 2 times a day.), Disp: 180 capsule, Rfl: 0  •  OXcarbazepine (TRILEPTAL) 300 MG Tab, Take 1 tablet by mouth 2 times a day., Disp: 180 tablet, Rfl: 0  •  fluoxetine (PROZAC) 40 MG capsule, Take 1 capsule by mouth every day., Disp: 90 capsule, Rfl: 0  •  traZODone (DESYREL) 100 MG Tab, Take 1 tablet by mouth every bedtime., Disp: 90 tablet, Rfl: 0  •  aspirin 81 MG EC tablet, Take 81 mg by mouth every morning., Disp: , Rfl:   •  Dulaglutide (TRULICITY) 1.5 MG/0.5ML Solution Pen-injector, Inject 0.5 mL under the skin every 7 days. (Patient taking differently: Inject 0.5 mL under the skin every Friday.), Disp: 6 mL, Rfl: 3  •  potassium chloride SA (KDUR) 20 MEQ Tab CR, Take 40 mEq by mouth 2 times a day. 2 tablets = 40 mEq., Disp: , Rfl:   •  mycophenolate (CELLCEPT) 500 MG tablet, Take 2 Tablets by mouth 2 times a day. (Patient taking differently: Take 500 mg by mouth 2 times a day.), Disp: 120 tablet, Rfl: 0  •   "levothyroxine (SYNTHROID) 100 MCG Tab, Take 100 mcg by mouth Every morning on an empty stomach., Disp: , Rfl:   •  Melatonin 10 MG Tab, Take 10 mg by mouth every bedtime., Disp: , Rfl:   •  albuterol 108 (90 Base) MCG/ACT Aero Soln inhalation aerosol, Inhale 2 Puffs every 6 hours as needed for Shortness of Breath., Disp: 8.5 g, Rfl: 6  •  Mirabegron ER (MYRBETRIQ) 50 MG TABLET SR 24 HR, Take 50 mg by mouth every day. (Patient taking differently: Take 50 mg by mouth every morning.), Disp: 90 Tab, Rfl: 3  •  sodium bicarbonate 325 MG Tab, Take 650 mg by mouth 2 times a day. 2 tabs = 650 mg, Disp: , Rfl:   •  topiramate (TOPAMAX) 50 MG tablet, Take 50 mg by mouth 2 times a day., Disp: , Rfl:   •  vitamin D, Ergocalciferol, (DRISDOL) 1.25 MG (72468 UT) Cap capsule, Take 50,000 Units by mouth every Friday. In the morning., Disp: , Rfl:     ALLERGIES:   Allergies   Allergen Reactions   • Depakote [Divalproex Sodium] Unspecified     Muscle spasms/muscle pain and weakness     • Amitriptyline Unspecified     Headaches     • Aripiprazole [Abilify] Unspecified     Headaches/muscle twitching     • Clindamycin Nausea     Even with food     • Flagyl [Metronidazole Hcl] Unspecified     \"eye problems\"     • Flomax [Tamsulosin Hydrochloride] Swelling   • Levaquin Unspecified     Severe muscle cramps in legs  RXN=unknown   • Metformin Unspecified     Increased lactic acid      • Tamsulosin Swelling     Swelling of legs   • Tape Rash     Tears skin off  coban with Tegaderm tape ok intermittently  RXN=ongoing   • Vancomycin Itching     Pt becomes flushed in face and chest.   RXN=7/10/16   • Wound Dressing Adhesive Hives     By pt report   • Ampicillin Rash     Pt reports that she received a rash    • Ciprofloxacin Rash         • Keflex Rash     Pt states she gets a rash with this medication  Tolerates ceftriaxone  Can take with Benadryl   • Levofloxacin Unspecified     Leg muscle cramps   • Metronidazole Rash     \"Vision problems\"   • " Penicillins Hives     Can take with Benadryl   • Sulfa Drugs Itching and Myalgia     Muscle pain and weakness   • Valproic Acid Rash     .     SURGHX:   Past Surgical History:   Procedure Laterality Date   • BOWEL STIMULATOR INSERTION  3/10/2021    Procedure: INSERTION, ELECTRODE LEADS AND PULSE GENERATOR, NEUROSTIMULATOR, SACRAL - REMOVAL OF INTERSTIM WITH REPLACEMENT OF SACRAL NEUROMODULATION DEVICE;  Surgeon: Joe Noyola M.D.;  Location: SURGERY [facility];  Service: General   • MUSCLE BIOPSY Right 1/26/2017    Procedure: MUSCLE BIOPSY - THIGH;  Surgeon: Isidro Vigil M.D.;  Location: SURGERY [facility];  Service:    • GASTROSCOPY WITH BALLOON DILATATION N/A 1/4/2017    Procedure: GASTROSCOPY WITH DILATATION;  Surgeon: Torres Vargas M.D.;  Location: [facility];  Service:    • BOWEL STIMULATOR INSERTION  7/13/2016    Procedure: BOWEL STIMULATOR INSERTION FOR PERMANENT INTERSTIM SACRAL IMPLANT;  Surgeon: Joe Noyola M.D.;  Location: [facility];  Service:    • RECOVERY  1/27/2016    Procedure: CATH LAB EP STUDY WITH SINUS NODE MODIFICATION ABHINAV;  Surgeon: Recoveryonly Surgery;  [place] [facility]  Service:    • OTHER CARDIAC SURGERY  1/2016    cardiac ablation   • NEURO DEST FACET L/S W/IG SNGL  4/21/2015    Performed by Reza Tabor at [facility]   • LUMBAR FUSION ANTERIOR  8/21/2012    Performed by SUSIE SAWANT at [facility]   • ALYSSA BY LAPAROSCOPY  8/29/2010    Performed by SHAYY JOHNS at [facility]   • LAMINOTOMY     • OTHER ABDOMINAL SURGERY     • TONSILLECTOMY      tonsillectomy     SOCHX:  reports that she has never smoked. She has never used smokeless tobacco. She reports previous drug use. Frequency: 7.00 times per week. Drug: Marijuana. She reports that she does not drink alcohol.     FH: Reviewed with patient, not pertinent to this visit.    Review of Systems   Constitutional: Positive for  "fever and malaise/fatigue.   Respiratory: Positive for cough and shortness of breath.    Musculoskeletal:        L chest wall pain   Neurological: Positive for dizziness.   All other systems reviewed and are negative.    Additional details per HPI.      Objective:     /74   Pulse 80   Temp 36.3 °C (97.3 °F) (Temporal)   Resp 18   Ht 1.651 m (5' 5\")   Wt 113 kg (250 lb)   SpO2 97%   BMI 41.60 kg/m²     Physical Exam  Vitals reviewed.   Constitutional:       General: She is not in acute distress.     Appearance: She is well-developed. She is ill-appearing. She is not toxic-appearing.   HENT:      Head: Normocephalic.      Right Ear: External ear normal.      Left Ear: External ear normal.   Eyes:      General: Vision grossly intact.      Extraocular Movements: Extraocular movements intact.      Conjunctiva/sclera: Conjunctivae normal.   Cardiovascular:      Rate and Rhythm: Normal rate and regular rhythm.      Heart sounds: Normal heart sounds.   Pulmonary:      Effort: Pulmonary effort is normal. No respiratory distress.      Breath sounds: Decreased breath sounds present.   Chest:      Chest wall: Tenderness (Left chest wall) present.   Musculoskeletal:         General: No deformity. Normal range of motion.      Cervical back: Normal range of motion.   Skin:     General: Skin is warm and dry.      Coloration: Skin is not pale.   Neurological:      Mental Status: She is alert and oriented to person, place, and time.      Sensory: No sensory deficit.      Motor: No weakness.      Coordination: Coordination normal.   Psychiatric:         Behavior: Behavior normal. Behavior is cooperative.     Chest x-ray:    Reading Physician Reading Date Result Priority   Armand Tavarez M.D.  [phone] 4/21/2021 Urgent Care      Narrative & Impression        4/21/2021 8:30 AM     HISTORY/REASON FOR EXAM:  Shortness of Breath.    TECHNIQUE/EXAM DESCRIPTION AND NUMBER OF VIEWS:  Two views of the chest.     COMPARISON: "  4/15/2021 and 4/13/2021     FINDINGS:  The heart is normal in size.  No pulmonary infiltrates or consolidations have developed.  Minor bilateral peribronchial cuffing is again noted which is nonspecific. This could represent bronchitis or bronchiolitis or reactive airway disease such as asthma.  No pleural effusions are appreciated.    IMPRESSION:     1.  No focal pulmonary consolidation.     2.  Persistent minor bilateral peribronchial cuffing which may represent bronchitis, bronchiolitis, or reactive air disease such as asthma.             Last Resulted: 04/21/21  8:46 AM        Radiology report and images reviewed by myself. Concur with findings.    EKG: NSR 72, no acute ST abnormalities      Assessment/Plan:     1. SOB (shortness of breath)  - DX-CHEST-2 VIEWS; Future  - EKG  - predniSONE (DELTASONE) 20 MG Tab; Take 3 tabs daily x 2 days, then 2 tabs daily x 2 days, then 1 tab on final day.  Dispense: 11 tablet; Refill: 0    2. Cough  - DX-CHEST-2 VIEWS; Future  - CBC WITH DIFFERENTIAL; Future  - Comp Metabolic Panel; Future  - ESTIMATED GFR; Future  - PROCALCITONIN; Future  - LACTIC ACID; Future    3. Left-sided chest wall pain  - DX-CHEST-2 VIEWS; Future  - EKG    4. Lightheaded  - EKG    5. History of recent hospitalization    6. History of bronchitis    7. Bronchitis  - predniSONE (DELTASONE) 20 MG Tab; Take 3 tabs daily x 2 days, then 2 tabs daily x 2 days, then 1 tab on final day.  Dispense: 11 tablet; Refill: 0    8. LRTI (lower respiratory tract infection)  - doxycycline (VIBRAMYCIN) 100 MG Tab; Take 1 tablet by mouth 2 times a day for 7 days.  Dispense: 14 tablet; Refill: 0    Rx as above sent electronically.  Continue with inhalers and nebulizer as directed.    Labs pending to further evaluate symptoms.    Vital signs stable, afebrile, no acute distress at this time. Warning signs reviewed. Strict return/ER precautions advised.     Differential diagnosis, natural history, supportive care,  over-the-counter symptom management per 's instructions, close monitoring, and indications for immediate follow-up discussed.     Patient advised to: Return for 1) Symptoms that worsen/don't improve, or go to ER, 2) Follow up with primary care in 7-10 days.    All questions answered. Patient agrees with the plan of care.    Discharge summary provided through [employer].

## 2021-04-21 NOTE — ED NOTES
Sepsis score 3.   Pt waiting OUTSIDE presidents [place] room.   Grandma aware she cannot wait with patient at this time per hospital protocol

## 2021-04-21 NOTE — ED PROVIDER NOTES
ED Provider Note    CHIEF COMPLAINT  Chief Complaint   Patient presents with   • Cough   • Dizziness       HPI  Kristin Balderrama is a 30 y.o. female who presents to the emergency department chief complaint of increased cough and lightheadedness.  The patient was recently hospitalized and discharged for bronchitis and COPD exacerbation.  She was sent home on steroids and breathing treatments and finishing azithromycin.  She was seen in urgent care earlier today and they decided to start her on doxycycline as well as increase her prednisone.  She states she came here because she still feeling short of breath.  She is on a baseline of 3 L.  She denies any chest pain but she is feeling more short of breath no fevers or chills she has been coughing.  She also feels nauseated but has had no vomiting.    REVIEW OF SYSTEMS  Positives as above. Pertinent negatives include fevers chills vomiting diarrhea constipation congestion headache neck stiffness  All other review of systems are negative    PAST MEDICAL HISTORY   has a past medical history of Abdominal pain, Anginal syndrome, Apnea, sleep, Arrhythmia, Arthritis, ASTHMA, Atrial fibrillation ([facility]), Back pain, Borderline personality disorder ([facility]), Breath shortness, Bronchitis, Cardiac arrhythmia, Chickenpox, Chronic UTI (9/18/2010), COPD (chronic obstructive pulmonary disease) ([facility]), Cough, Daytime sleepiness, Dental disorder (03/08/2021), Depression, Diabetes ([facility]), Diarrhea, Disorder of thyroid, Fall, Fatigue, Frequent headaches, Gasping for breath, Gynecological disorder, Headache(784.0), Heart burn, Heart murmur, History of falling, Indigestion, Migraine, 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    SOCIAL HISTORY  Social History     Tobacco Use   • Smoking status: Never Smoker   • Smokeless tobacco: Never Used   Substance and Sexual Activity   • Alcohol use: No     Alcohol/week: 0.0 oz   • Drug use: Not Currently     Frequency: 7.0 times per week     Types: Marijuana   • Sexual activity: Not Currently     Birth control/protection: Pill       SURGICAL HISTORY   has a past surgical history that includes neuro dest facet l/s w/ig sngl (4/21/2015); recovery (1/27/2016); delmar by laparoscopy (8/29/2010); lumbar fusion anterior (8/21/2012); other cardiac surgery (1/2016); tonsillectomy; bowel stimulator insertion (7/13/2016); gastroscopy with balloon dilatation (N/A, 1/4/2017); muscle biopsy (Right, 1/26/2017); other abdominal surgery; laminotomy; and bowel stimulator insertion (3/10/2021).    CURRENT MEDICATIONS  Home Medications    **Home medications have not yet been reviewed for this encounter**         ALLERGIES  Allergies   Allergen Reactions   • Depakote [Divalproex Sodium] Unspecified     Muscle spasms/muscle pain and weakness     • Amitriptyline Unspecified     Headaches     • Aripiprazole [Abilify] Unspecified     Headaches/muscle twitching     • Clindamycin Nausea     Even with food     • Flagyl [Metronidazole Hcl] Unspecified     \"eye problems\"     • Flomax [Tamsulosin Hydrochloride] Swelling   • Levaquin Unspecified     Severe muscle cramps in legs  RXN=unknown   • Metformin Unspecified     Increased lactic acid      • Tamsulosin Swelling     Swelling of legs   • Tape Rash     Tears skin off  coban with Tegaderm tape ok intermittently  RXN=ongoing   • Vancomycin Itching     Pt becomes flushed in face and chest.   RXN=7/10/16   • Wound Dressing Adhesive Hives     By pt report   • Ampicillin Rash     Pt reports that she received a rash    • Ciprofloxacin Rash       " "  • Keflex Rash     Pt states she gets a rash with this medication  Tolerates ceftriaxone  Can take with Benadryl   • Levofloxacin Unspecified     Leg muscle cramps   • Metronidazole Rash     \"Vision problems\"   • Penicillins Hives     Can take with Benadryl   • Sulfa Drugs Itching and Myalgia     Muscle pain and weakness   • Valproic Acid Rash     .       PHYSICAL EXAM  VITAL SIGNS: /67   Pulse 95   Temp 36.4 °C (97.6 °F) (Temporal)   Resp (!) 22   Ht 1.651 m (5' 5\")   Wt 114 kg (251 lb 5.2 oz)   SpO2 96%   BMI 41.82 kg/m²    Pulse ox interpretation: I interpret this pulse ox as normal.  This was done on room air  Constitutional: Alert in no apparent distress.  HENT: Normocephalic atraumatic, MMM  Eyes: PER, Conjunctiva normal, Non-icteric.   Neck: Normal range of motion, No tenderness, Supple, No stridor.   Cardiovascular: Regular rate and rhythm, no murmurs.   Thorax & Lungs: Bilateral wheezes at the bases no respiratory distress, No chest tenderness.   Abdomen: Bowel sounds normal, Soft, No tenderness, No pulsatile masses. No peritoneal signs.  Skin: Warm, Dry, No erythema, No rash.   Back: No bony tenderness, No CVA tenderness.   Extremities/MSK: Intact equal distal pulses, No edema, No tenderness, No cyanosis, no major deformities noted  Neurologic: Alert and oriented x3, No focal deficits noted.       DIFFERENTIAL DIAGNOSIS AND WORK UP PLAN    This is a 30 y.o. female who presents with recent diagnosed bronchitis seen in urgent care today, she is currently wheezing and on room air when she walked in the door she was in the mid 90s at rest she may dipped to about 89% when she starts talking she perked right up she has however on 3 L nasal cannula at home so she was put on her baseline oxygen.  She will be given a [facility] will be repeated chest x-ray from earlier today does not show any worsening of her symptoms.  She is already increasing her steroids and at this point I do not " "believe she needs a change in antibiotics    DIAGNOSTIC STUDIES / PROCEDURES    EKG      LABS  Pertinent Lab Findings  CBC with a left shift but a normal white count CMP with mild acidosis without anion gap normal lactate blood culture sent      RADIOLOGY  No orders to display         IMPRESSION:     1.  No focal pulmonary consolidation.     2.  Persistent minor bilateral peribronchial cuffing which may represent bronchitis, bronchiolitis, or reactive air disease such as asthma.    The radiologist's interpretation of all radiological studies have been reviewed by me.      COURSE & MEDICAL DECISION MAKING  Pertinent Labs & Imaging studies reviewed. (See chart for details)    5:03 PM  I reassessed patient the bedside she is no longer wheezing after albuterol states she feels a little shaky because of it but she otherwise resting comfortably her lactate did not increase her laboratory analysis is really unchanged her chest x-ray shows no acute changes.  Patient is more calm and cooperative with myself and nursing staff or not in the room she is not requiring any increased oxygen demand she can be discharged home with strict return precautions she is already planning on increasing her prednisone and was written a prescription for antibiotics by the urgent care.  She has already started these medications I do not necessarily believe that she needs them because this is most likely just bronchitis in the setting of history of asthma and she really just needs the steroids and breathing treatments.  But she is already started it and she will finish her azithromycin return to the ED for any new or worsening issues  Patient is feeling better after the Reglan for her nausea she has been throwing up at home but she would like the Reglan to take home    /64   Pulse 83   Temp 36.4 °C (97.6 °F) (Temporal)   Resp (!) 25   Ht 1.651 m (5' 5\")   Wt 114 kg (251 lb 5.2 oz)   SpO2 100%       I verified that the patient was " wearing a mask and I was wearing appropriate PPE every time I entered the room. The patient's mask was on the patient at all times during my encounter except for a brief view of the oropharynx.    The patient will return for new or worsening symptoms and is stable at the time of discharge.    The patient is referred to a primary physician for blood pressure management, diabetic screening, and for all other preventative health concerns.    DISPOSITION:  Patient will be discharged home in stable condition.    FOLLOW UP:  Torres Brody M.D.  [address]  [facility] [address]  [phone]    Schedule an appointment as soon as possible for a visit       [facility], Emergency Dept  [address] [facility] [id number] [phone]    If symptoms worsen      OUTPATIENT MEDICATIONS:  Discharge Medication List as of 4/21/2021  5:17 PM      START taking these medications    Details   metoclopramide (REGLAN) 10 MG Tab Take 1 tablet by mouth 4 times a day as needed (nausea vomiting) for up to 5 days., Disp-20 tablet, R-0, Normal                 FINAL IMPRESSION  1. Bronchitis     2. Severe persistent asthma with acute exacerbation             Electronically signed by: Junie Saldana M.D., 4/21/2021 4:02 PM    This dictation has been created using voice recognition software and/or scribes. The accuracy of the dictation is limited by the abilities of the software and the expertise of the scribes. I expect there may be some errors of grammar and possibly content. I made every attempt to manually correct the errors within my dictation. However, errors related to voice recognition software and/or scribes may still exist and should be interpreted within the appropriate context.

## 2021-04-21 NOTE — ED TRIAGE NOTES
Pt to ED with complaints of cough, COB, dizziness. Recently hospitalized with bronchitis. She was released on Monday and today started to feeling more shortness of breath and lightheadedness. She is being treated with doxycycline, predinsone and inhalers at home, but is feeling worse. +green sputum production. No fever at home. Sent by .     Mask in place. [facility]    Pt educated on ED process and asked to wait in lobby. Patient educated on importance of alerting staff to new or worsening symptoms or concerns.

## 2021-04-21 NOTE — ED NOTES
Pt to draw room for labs and recheck. VSS. Reports she is feeling worse. No obvious respiratory distress.

## 2021-04-21 NOTE — ED NOTES
"US IV established. Blood sent to lab. Pt speaking in 1-2 word sentences and advised \"I feel like something is about to happen\". Charge RN aware. Pt to room 39 immediately.   "

## 2021-04-21 NOTE — ED NOTES
"Pt in triage advising \"my lactic is going up, I can feel it\". Pt reassured and advised triage rn would be with her shortly.   "

## 2021-04-22 NOTE — DISCHARGE INSTRUCTIONS
Continue the treatments that were prescribed to continue and finish your steroids return emergency department for worsening difficulty breathing vomiting or uncontrolled symptoms

## 2021-04-22 NOTE — ED NOTES
DC home with written and verbal instructions regarding f/u, activity and RX.  Verbalized understanding, WC out.
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
## 2021-04-22 NOTE — DISCHARGE PLANNING
RN asked SW to assist with Pt and getting her home.   Pt is tearful in the room stating her family is upset for her coming to the ED for a breathing treatment.     Pt called her Aunt Etelvina who is actually at the Hospital driving around waiting for Pt to be brought out from her room. Etelvina states she has been circling hospital for 30 minutes and verbalized being upset because the Pt called her stating she was ready to be picked up and she has been waiting for over 30 minutes. She states Pt always comes to ED for Breathing treatments then calls for a ride. She stated they encourage Pt to do breathing treatments at home but she continues to come to the ED and it is difficult for them to always come down here to pick her up. Etelvina states her mother (Pt grandmother ) is 85 and its difficult for her to come here and Etelvina works so its not always easy.     SW spoke to Aunt about Pt being an adult and if they cant pick her up there are other options for transportation. Pt does have medicaid with transportation benefits and if they are unable to pick Pt up Pt could use her [place] benefits for a ride home.     Pt was discharged to home with her aunt.

## 2021-04-23 ENCOUNTER — HOSPITAL ENCOUNTER (EMERGENCY)
Facility: MEDICAL CENTER | Age: 31
End: 2021-04-23
Attending: EMERGENCY MEDICINE
Payer: MEDICARE

## 2021-04-23 VITALS
OXYGEN SATURATION: 94 % | DIASTOLIC BLOOD PRESSURE: 82 MMHG | TEMPERATURE: 97.1 F | WEIGHT: 245.37 LBS | HEART RATE: 74 BPM | RESPIRATION RATE: 16 BRPM | BODY MASS INDEX: 40.88 KG/M2 | SYSTOLIC BLOOD PRESSURE: 130 MMHG | HEIGHT: 65 IN

## 2021-04-23 DIAGNOSIS — J45.41 MODERATE PERSISTENT ASTHMA WITH ACUTE EXACERBATION: ICD-10-CM

## 2021-04-23 LAB
BACTERIA BLD CULT: ABNORMAL
BACTERIA BLD CULT: ABNORMAL
SIGNIFICANT IND 70042: ABNORMAL
SITE SITE: ABNORMAL
SOURCE SOURCE: ABNORMAL

## 2021-04-23 PROCEDURE — 99283 EMERGENCY DEPT VISIT LOW MDM: CPT

## 2021-04-23 PROCEDURE — 700101 HCHG RX REV CODE 250: Performed by: EMERGENCY MEDICINE

## 2021-04-23 PROCEDURE — 94640 AIRWAY INHALATION TREATMENT: CPT

## 2021-04-23 RX ORDER — PREDNISONE 20 MG/1
TABLET ORAL
Qty: 32 TABLET | Refills: 0 | Status: ON HOLD
Start: 2021-04-23 | End: 2021-04-28

## 2021-04-23 RX ORDER — IPRATROPIUM BROMIDE AND ALBUTEROL SULFATE 2.5; .5 MG/3ML; MG/3ML
3 SOLUTION RESPIRATORY (INHALATION)
Status: DISCONTINUED | OUTPATIENT
Start: 2021-04-23 | End: 2021-04-23 | Stop reason: HOSPADM

## 2021-04-23 RX ORDER — PREDNISONE 20 MG/1
TABLET ORAL
Qty: 32 TABLET | Refills: 0 | Status: SHIPPED | OUTPATIENT
Start: 2021-04-23 | End: 2021-04-23 | Stop reason: SDUPTHER

## 2021-04-23 RX ADMIN — IPRATROPIUM BROMIDE AND ALBUTEROL SULFATE 3 ML: .5; 3 SOLUTION RESPIRATORY (INHALATION) at 19:32

## 2021-04-23 ASSESSMENT — FIBROSIS 4 INDEX
FIB4 SCORE: 0.45
FIB4 SCORE: 0.45

## 2021-04-23 NOTE — ED NOTES
"ED Positive Culture Follow-up/Notification Note:    Date: 4/23/2021     Patient seen in the ED on 4/21/2021 for cough and dizziness.   1. Bronchitis    2. Severe persistent asthma with acute exacerbation       Discharge Medication List as of 4/21/2021  5:17 PM      START taking these medications    Details   metoclopramide (REGLAN) 10 MG Tab Take 1 tablet by mouth 4 times a day as needed (nausea vomiting) for up to 5 days., Disp-20 tablet, R-0, Normal             Allergies: Depakote [divalproex sodium], Amitriptyline, Aripiprazole [abilify], Clindamycin, Flagyl [metronidazole hcl], Flomax [tamsulosin hydrochloride], Levaquin, Metformin, Tamsulosin, Tape, Vancomycin, Wound dressing adhesive, Ampicillin, Ciprofloxacin, Keflex, Levofloxacin, Metronidazole, Penicillins, Sulfa drugs, and Valproic acid     Vitals:    04/21/21 1319 04/21/21 1349 04/21/21 1531 04/21/21 1701   BP: 135/90  123/67 116/64   Pulse: (!) 105  95 83   Resp: (!) 26  (!) 22 (!) 25   Temp: 36.4 °C (97.6 °F)  36.4 °C (97.6 °F)    TempSrc: Temporal  Temporal    SpO2: 96%   100%   Weight:  114 kg (251 lb 5.2 oz)     Height: 1.651 m (5' 5\") 1.651 m (5' 5\")         Final cultures:   Results     Procedure Component Value Units Date/Time    BLOOD CULTURE [[id number]] Collected: 04/21/21 1542    Order Status: Completed Specimen: Blood from Peripheral Updated: 04/22/21 1923     Significant Indicator NEG     Source BLD     Site PERIPHERAL     Culture Result Gram Stain: Gram positive cocci: Possible Staphylococcus sp.  Negative for Staphylococcus aureus and MRSA by PCR. Correlate  ongoing need for antibiotics with clinical condition.  Further report to follow.      Narrative:      CALL  Reis  ER tel. ,  CALLED  ER tel.  04/22/2021, 19:23, RB PERF. RESULTS CALLED TO:[phone] and  [phone]  Per Hospital Policy: Only change Specimen Src: to \"Line\" if  specified by physician order.  Left AC    BLOOD CULTURE [[id number]] Collected: 04/21/21 1542    Order Status: " "Completed Specimen: Blood from Peripheral Updated: 04/22/21 0716     Significant Indicator NEG     Source BLD     Site PERIPHERAL     Culture Result No Growth  Note: Blood cultures are incubated for 5 days and  are monitored continuously.Positive blood cultures  are called to the RN and reported as soon as  they are identified.      Narrative:      Per Hospital Policy: Only change Specimen Src: to \"Line\" if  specified by physician order.  Left Hand    URINALYSIS [[id number]]     Order Status: Canceled Specimen: Urine     URINE CULTURE(NEW) [[id number]]     Order Status: Canceled Specimen: Urine           Plan:   Contacted microbiology Co[facility] in 1 bottle in one set.   Patient was afebrile and did not have leukocytosis in the ER.   Possible contaminant, will contact patient to see if she is symptomatic.   Patient states that she has had two asthma exacerbations since she has left the ER, has been needing frequent use of rescue inhalers, and has now ran out of her rescue inhaler.   I instructed patient to return to the emergency room, patient said she does not think her family would allow her to return. Patient also endorsed a low grade fever, discussed culture results with patient and recommended again that she return to the emergency room she agreed.   Coagulase negative staphylococcus is likely a contaminant.     John Sandy, PharmD    "

## 2021-04-23 NOTE — ED TRIAGE NOTES
Ambulates to triage with a walker  Chief Complaint   Patient presents with   • Abnormal Labs     called for positive blood cultures from last admission     Pt was just released from the hospital 4/21, was seen for bronchitis, discharged with doxycycline. Does not feel any better, is still having fevers, and was called today by micro with + blood cultures.

## 2021-04-24 ENCOUNTER — HOSPITAL ENCOUNTER (OUTPATIENT)
Facility: MEDICAL CENTER | Age: 31
DRG: 202 | End: 2021-04-24
Attending: EMERGENCY MEDICINE
Payer: MEDICARE

## 2021-04-24 ENCOUNTER — APPOINTMENT (OUTPATIENT)
Dept: RADIOLOGY | Facility: MEDICAL CENTER | Age: 31
DRG: 202 | End: 2021-04-24
Attending: EMERGENCY MEDICINE
Payer: MEDICARE

## 2021-04-24 VITALS
WEIGHT: 244.71 LBS | DIASTOLIC BLOOD PRESSURE: 66 MMHG | BODY MASS INDEX: 40.77 KG/M2 | HEART RATE: 62 BPM | OXYGEN SATURATION: 96 % | RESPIRATION RATE: 16 BRPM | HEIGHT: 65 IN | TEMPERATURE: 97.5 F | SYSTOLIC BLOOD PRESSURE: 128 MMHG

## 2021-04-24 DIAGNOSIS — R00.2 PALPITATIONS: ICD-10-CM

## 2021-04-24 DIAGNOSIS — R07.9 CHEST PAIN, UNSPECIFIED TYPE: ICD-10-CM

## 2021-04-24 LAB
ALBUMIN SERPL BCP-MCNC: 4.3 G/DL (ref 3.2–4.9)
ALBUMIN/GLOB SERPL: 2 G/DL
ALP SERPL-CCNC: 82 U/L (ref 30–99)
ALT SERPL-CCNC: 29 U/L (ref 2–50)
ANION GAP SERPL CALC-SCNC: 12 MMOL/L (ref 7–16)
APPEARANCE UR: CLEAR
AST SERPL-CCNC: 10 U/L (ref 12–45)
BASOPHILS # BLD AUTO: 0.4 % (ref 0–1.8)
BASOPHILS # BLD: 0.03 K/UL (ref 0–0.12)
BILIRUB SERPL-MCNC: 0.3 MG/DL (ref 0.1–1.5)
BILIRUB UR QL STRIP.AUTO: NEGATIVE
BUN SERPL-MCNC: 15 MG/DL (ref 8–22)
C DIFF DNA SPEC QL NAA+PROBE: NEGATIVE
C DIFF TOX GENS STL QL NAA+PROBE: NEGATIVE
CALCIUM SERPL-MCNC: 9 MG/DL (ref 8.5–10.5)
CHLORIDE SERPL-SCNC: 102 MMOL/L (ref 96–112)
CO2 SERPL-SCNC: 21 MMOL/L (ref 20–33)
COLOR UR: YELLOW
CREAT SERPL-MCNC: 0.64 MG/DL (ref 0.5–1.4)
EKG IMPRESSION: NORMAL
EOSINOPHIL # BLD AUTO: 0.09 K/UL (ref 0–0.51)
EOSINOPHIL NFR BLD: 1.3 % (ref 0–6.9)
EPI CELLS #/AREA URNS HPF: NORMAL /HPF
ERYTHROCYTE [DISTWIDTH] IN BLOOD BY AUTOMATED COUNT: 47.6 FL (ref 35.9–50)
GLOBULIN SER CALC-MCNC: 2.2 G/DL (ref 1.9–3.5)
GLUCOSE SERPL-MCNC: 85 MG/DL (ref 65–99)
GLUCOSE UR STRIP.AUTO-MCNC: NEGATIVE MG/DL
HCG SERPL QL: NEGATIVE
HCT VFR BLD AUTO: 39.2 % (ref 37–47)
HGB BLD-MCNC: 12.2 G/DL (ref 12–16)
IMM GRANULOCYTES # BLD AUTO: 0.06 K/UL (ref 0–0.11)
IMM GRANULOCYTES NFR BLD AUTO: 0.9 % (ref 0–0.9)
KETONES UR STRIP.AUTO-MCNC: NEGATIVE MG/DL
LEUKOCYTE ESTERASE UR QL STRIP.AUTO: NEGATIVE
LIPASE SERPL-CCNC: 44 U/L (ref 11–82)
LYMPHOCYTES # BLD AUTO: 1.67 K/UL (ref 1–4.8)
LYMPHOCYTES NFR BLD: 24.9 % (ref 22–41)
MCH RBC QN AUTO: 28.6 PG (ref 27–33)
MCHC RBC AUTO-ENTMCNC: 31.1 G/DL (ref 33.6–35)
MCV RBC AUTO: 92 FL (ref 81.4–97.8)
MICRO URNS: ABNORMAL
MONOCYTES # BLD AUTO: 0.44 K/UL (ref 0–0.85)
MONOCYTES NFR BLD AUTO: 6.6 % (ref 0–13.4)
MUCOUS THREADS #/AREA URNS HPF: NORMAL /HPF
NEUTROPHILS # BLD AUTO: 4.41 K/UL (ref 2–7.15)
NEUTROPHILS NFR BLD: 65.9 % (ref 44–72)
NITRITE UR QL STRIP.AUTO: POSITIVE
NRBC # BLD AUTO: 0 K/UL
NRBC BLD-RTO: 0 /100 WBC
PH UR STRIP.AUTO: 6 [PH] (ref 5–8)
PLATELET # BLD AUTO: 188 K/UL (ref 164–446)
PMV BLD AUTO: 12.1 FL (ref 9–12.9)
POTASSIUM SERPL-SCNC: 3.5 MMOL/L (ref 3.6–5.5)
PROT SERPL-MCNC: 6.5 G/DL (ref 6–8.2)
PROT UR QL STRIP: NEGATIVE MG/DL
RBC # BLD AUTO: 4.26 M/UL (ref 4.2–5.4)
RBC # URNS HPF: NORMAL /HPF
RBC UR QL AUTO: NEGATIVE
SODIUM SERPL-SCNC: 135 MMOL/L (ref 135–145)
SP GR UR STRIP.AUTO: >=1.03
TROPONIN T SERPL-MCNC: <6 NG/L (ref 6–19)
UROBILINOGEN UR STRIP.AUTO-MCNC: 0.2 MG/DL
WBC # BLD AUTO: 6.7 K/UL (ref 4.8–10.8)
WBC #/AREA URNS HPF: NORMAL /HPF

## 2021-04-24 PROCEDURE — 85025 COMPLETE CBC W/AUTO DIFF WBC: CPT

## 2021-04-24 PROCEDURE — 84703 CHORIONIC GONADOTROPIN ASSAY: CPT

## 2021-04-24 PROCEDURE — 83690 ASSAY OF LIPASE: CPT

## 2021-04-24 PROCEDURE — 93005 ELECTROCARDIOGRAM TRACING: CPT

## 2021-04-24 PROCEDURE — 99284 EMERGENCY DEPT VISIT MOD MDM: CPT

## 2021-04-24 PROCEDURE — 81001 URINALYSIS AUTO W/SCOPE: CPT

## 2021-04-24 PROCEDURE — 700102 HCHG RX REV CODE 250 W/ 637 OVERRIDE(OP): Performed by: EMERGENCY MEDICINE

## 2021-04-24 PROCEDURE — 700111 HCHG RX REV CODE 636 W/ 250 OVERRIDE (IP): Performed by: EMERGENCY MEDICINE

## 2021-04-24 PROCEDURE — A9270 NON-COVERED ITEM OR SERVICE: HCPCS | Performed by: EMERGENCY MEDICINE

## 2021-04-24 PROCEDURE — 93005 ELECTROCARDIOGRAM TRACING: CPT | Performed by: EMERGENCY MEDICINE

## 2021-04-24 PROCEDURE — 36415 COLL VENOUS BLD VENIPUNCTURE: CPT

## 2021-04-24 PROCEDURE — 80053 COMPREHEN METABOLIC PANEL: CPT

## 2021-04-24 PROCEDURE — 87493 C DIFF AMPLIFIED PROBE: CPT

## 2021-04-24 PROCEDURE — 96374 THER/PROPH/DIAG INJ IV PUSH: CPT

## 2021-04-24 PROCEDURE — 71045 X-RAY EXAM CHEST 1 VIEW: CPT

## 2021-04-24 PROCEDURE — 87086 URINE CULTURE/COLONY COUNT: CPT

## 2021-04-24 PROCEDURE — 84484 ASSAY OF TROPONIN QUANT: CPT

## 2021-04-24 PROCEDURE — 96375 TX/PRO/DX INJ NEW DRUG ADDON: CPT

## 2021-04-24 RX ORDER — MORPHINE SULFATE 4 MG/ML
4 INJECTION, SOLUTION INTRAMUSCULAR; INTRAVENOUS ONCE
Status: COMPLETED | OUTPATIENT
Start: 2021-04-24 | End: 2021-04-24

## 2021-04-24 RX ORDER — ONDANSETRON 2 MG/ML
4 INJECTION INTRAMUSCULAR; INTRAVENOUS ONCE
Status: COMPLETED | OUTPATIENT
Start: 2021-04-24 | End: 2021-04-24

## 2021-04-24 RX ORDER — ASPIRIN 81 MG/1
324 TABLET, CHEWABLE ORAL ONCE
Status: COMPLETED | OUTPATIENT
Start: 2021-04-24 | End: 2021-04-24

## 2021-04-24 RX ADMIN — MORPHINE SULFATE 4 MG: 4 INJECTION INTRAVENOUS at 10:14

## 2021-04-24 RX ADMIN — ONDANSETRON 4 MG: 2 INJECTION INTRAMUSCULAR; INTRAVENOUS at 10:14

## 2021-04-24 RX ADMIN — ASPIRIN 324 MG: 81 TABLET, CHEWABLE ORAL at 10:14

## 2021-04-24 ASSESSMENT — FIBROSIS 4 INDEX: FIB4 SCORE: 0.45

## 2021-04-24 NOTE — ED TRIAGE NOTES
"Chief Complaint   Patient presents with   • Irregular Heart Beat     Pt states she went to a lab today to deliver sample for possible C-diff, pt states the HR monitor on her phone connected to her watch was showing a variable rate from  BPM. Hx A-fib, ablation 2016 at this hospital, hx SVT.    • Lightheadedness     Today associated with variable heart rate. Dx of bronchitis last week, states she was in the ICU for four days, hx asthma.      Pt to triage for above complaints, GCS 15, VSS on RA, NAD.    Pt returned to lobby. Educated on triage process and to inform staff of any changes.     /90   Pulse 60   Temp 36.3 °C (97.3 °F) (Temporal)   Resp 18   Ht 1.651 m (5' 5\")   Wt 111 kg (244 lb 11.4 oz)   SpO2 97%   BMI 40.72 kg/m²      "

## 2021-04-24 NOTE — ED NOTES
Pt given discharge instructions/ home care instructions explained, pt verbalized understanding of instructions given/pt understands the importance of follow up, pt to [facility] morrow, calling for ride home.

## 2021-04-24 NOTE — ED NOTES
Supplies to collect stool sample given to pt, all questions answered, all belongings accounted for, pt able to ambulate with walker to ER exit

## 2021-04-24 NOTE — DISCHARGE INSTRUCTIONS
Return for recurrent chest pain trouble breathing or other concerns to include palpitations.  Please follow-up with Dr. Ch.

## 2021-04-24 NOTE — ED NOTES
Patient walked with a steady gate at this time to er red care area room 02. Placed patient into gown, on heart monitor, on auto bp,  spo2, gave warm blanket, and call light. Ready to be seen  rn is at bedside

## 2021-04-24 NOTE — ED PROVIDER NOTES
"ED Provider Note    CHIEF COMPLAINT  Chief Complaint   Patient presents with   • Abnormal Labs     called for positive blood cultures from last admission   • Diarrhea     \"10 times yesterday\"       HPI  Kristin Balderrama is a 30 y.o. female who presents after being called back for a positive blood culture.  History of asthma with an exacerbation over the last 2 weeks.  She was admitted from the 18th to the 21st for this.  She had CT of the abdomen and pelvis during this time due to abdominal pain which continues and that study was normal.  She took a course of azithromycin and is now on doxycycline.  She is on a prednisone taper.  She takes Flovent 220 twice daily and uses albuterol 4 puffs every 4 hours and DuoNeb daily as well.  She reports continued shortness of breath that is episodic and about the same as is been throughout this illness.  No COPD or tobacco use.  She has had some pleuritic chest pain.  History of prior arm DVT associated with IV.  She is not anticoagulated.  No leg swelling or calf pain.  No pregnancy.  Patient was seen again in the ER and urgent care on the day of discharge from the hospital and was restarted on the doxycycline by urgent care.    REVIEW OF SYSTEMS  Pertinent positives include: Positive blood culture, shortness of breath, wheezing, asthma, antibiotic steroid Flovent and albuterol use.  Reports fevers continue to 100.  Rhinorrhea, headache, diarrhea 10 times a day for 3 days.  History of C. difficile.  Continuing abdominal pain.  Pertinent negatives include: Vomiting, Covid, leg swelling, calf pain.    PAST MEDICAL HISTORY  Past Medical History:   Diagnosis Date   • Abdominal pain    • Anginal syndrome     random chest pain especially with tachycardia   • Apnea, sleep    • Arrhythmia     \"sinus tachycardia\", cariologist, Dr. Kumar; ablation 2/2016   • Arthritis     osteo   • ASTHMA     when around smoke   • Atrial fibrillation (HCC)    • Back pain    • Borderline personality " "disorder ([facility])    • Breath shortness     with tachycardia   • Bronchitis    • Cardiac arrhythmia    • Chickenpox    • Chronic UTI 9/18/2010   • COPD (chronic obstructive pulmonary disease) ([facility])    • Cough    • Daytime sleepiness    • Dental disorder 03/08/2021    infected tooth   • Depression    • Diabetes ([facility])    • Diarrhea     incontinece   • Disorder of thyroid    • Fall    • Fatigue    • Frequent headaches    • Gasping for breath    • Gynecological disorder     PCOS   • Headache(784.0)    • Heart burn    • Heart murmur    • History of falling    • Indigestion    • Migraine    • Mitochondrial disease ([facility])    • 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[facility])    • Nausea    • Obesity    • Other fatigue 6/29/2020   • Pain 08-15-12    back, 7/10   • Painful joint    • PCOS (polycystic ovarian syndrome)    • Pneumonia 2012 12/2020   • Psychosis ([facility])    • Ringing in ears    • Scoliosis    • Shortness of breath     O2 as needed   • Sinus tachycardia 10/31/2013   • Sleep apnea     CPAP \"pulmonary doctor took me off mid year 2016\"   • Snoring    • Tonsillitis    • 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[facility])    • Tuberculosis     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   • Urinary bladder disorder     Suprapubic cath   • Urinary incontinence    • Weakness    • Wears glasses        SOCIAL HISTORY  No tobacco use.    CURRENT MEDICATIONS  Home Medications     Reviewed by Eulalia Hurtado R.N. (Registered Nurse) on 04/23/21 at 1522  Med List Status: Complete   Medication Last Dose Status   albuterol 108 (90 Base) MCG/ACT Aero Soln inhalation aerosol 4/23/2021 Active   aspirin 81 MG EC tablet 4/23/2021 Active   baclofen (LIORESAL) 10 MG Tab 4/23/2021 Active   benzonatate (TESSALON) 100 MG Cap  Active   busPIRone (BUSPAR) 30 MG tablet 4/23/2021 Active   diphenhydrAMINE (BENADRYL) 25 MG Tab 4/23/2021 Active   doxycycline (VIBRAMYCIN) 100 MG Tab 4/23/2021 Active   Dulaglutide (TRULICITY) 1.5 MG/0.5ML Solution Pen-injector 4/23/2021 Active   ferrous " "sulfate 325 (65 Fe) MG tablet 4/23/2021 Active   fluoxetine (PROZAC) 40 MG capsule 4/23/2021 Active   fluticasone (FLONASE) 50 MCG/ACT nasal spray  Active   fluticasone (FLONASE) 50 MCG/ACT nasal spray  Active   fluticasone (FLOVENT HFA) 220 MCG/ACT Aerosol 4/23/2021 Active   gabapentin (NEURONTIN) 300 MG Cap 4/23/2021 Active   ibuprofen (MOTRIN) 400 MG Tab 4/23/2021 Active   ipratropium-albuterol (DUONEB) 0.5-2.5 (3) MG/3ML nebulizer solution 4/23/2021 Active   ivabradine (CORLANOR) 7.5 MG Tab tablet 4/23/2021 Active   levothyroxine (SYNTHROID) 100 MCG Tab 4/23/2021 Active   Melatonin 10 MG Tab 4/22/2021 Active   methocarbamol (ROBAXIN) 750 MG Tab 4/22/2021 Active   metoclopramide (REGLAN) 10 MG Tab 4/23/2021 Active   metoprolol SR (TOPROL XL) 25 MG TABLET SR 24 HR 4/22/2021 Active   Mirabegron ER (MYRBETRIQ) 50 MG TABLET SR 24 HR 4/23/2021 Active   mycophenolate (CELLCEPT) 500 MG tablet 4/23/2021 Active   ondansetron (ZOFRAN) 4 MG/2ML Solution injection  Active   OXcarbazepine (TRILEPTAL) 300 MG Tab 4/23/2021 Active   potassium chloride SA (KDUR) 20 MEQ Tab CR 4/22/2021 Active   predniSONE (DELTASONE) 20 MG Tab 4/23/2021 Active   pregabalin (LYRICA) 300 MG capsule 4/23/2021 Active   sodium bicarbonate 325 MG Tab 4/23/2021 Active   topiramate (TOPAMAX) 50 MG tablet 4/23/2021 Active   traZODone (DESYREL) 100 MG Tab 4/22/2021 Active   vitamin D, Ergocalciferol, (DRISDOL) 1.25 MG (15965 UT) Cap capsule 4/23/2021 Active   ziprasidone (GEODON) 40 MG Cap 4/23/2021 Active                ALLERGIES  Allergies   Allergen Reactions   • Depakote [Divalproex Sodium] Unspecified     Muscle spasms/muscle pain and weakness     • Amitriptyline Unspecified     Headaches     • Aripiprazole [Abilify] Unspecified     Headaches/muscle twitching     • Clindamycin Nausea     Even with food     • Flagyl [Metronidazole Hcl] Unspecified     \"eye problems\"     • Flomax [Tamsulosin Hydrochloride] Swelling   • Levaquin Unspecified     Severe " "muscle cramps in legs  RXN=unknown   • Metformin Unspecified     Increased lactic acid      • Tamsulosin Swelling     Swelling of legs   • Tape Rash     Tears skin off  coban with Tegaderm tape ok intermittently  RXN=ongoing   • Vancomycin Itching     Pt becomes flushed in face and chest.   RXN=7/10/16   • Wound Dressing Adhesive Hives     By pt report   • Ampicillin Rash     Pt reports that she received a rash    • Ciprofloxacin Rash         • Keflex Rash     Pt states she gets a rash with this medication  Tolerates ceftriaxone  Can take with Benadryl   • Levofloxacin Unspecified     Leg muscle cramps   • Metronidazole Rash     \"Vision problems\"   • Penicillins Hives     Can take with Benadryl   • Sulfa Drugs Itching and Myalgia     Muscle pain and weakness   • Valproic Acid Rash     .       PHYSICAL EXAM  VITAL SIGNS: /79   Pulse 67   Temp 36.2 °C (97.1 °F) (Temporal)   Resp 16   Ht 1.651 m (5' 5\")   Wt 111 kg (245 lb 6 oz)   SpO2 96%   BMI 40.83 kg/m² . Reviewed and afebrile, no tachypnea, no hypoxia on room air  Constitutional :  Well developed, Well nourished, appearing, moderately overweight, speaking full sentences.   HNT: atraumatic, wearing a mask.   Ears: external ears normal.  Eyes: pupils reactive without eye discharge nor conjunctival hyperemia.  Neck: Normal range of motion, No tenderness, Supple, No stridor.   Lymphatic: No cervical adenopathy.   Cardiovascular: Regular rhythm, No murmurs, No rubs, No gallops.  No cyanosis.  No dependent edema or calf tenderness  Respiratory: No rales, rhonchi, or cough.  Quite good airflow.  Mild wheeze on 1 expiration attempt.  Abdomen:  Soft, generalized mild tenderness  Skin: Warm, dry, no erythema, no rash.   Musculoskeletal: no limb deformities.      LABORATORY:  C. difficile testing ordered    INTERVENTIONS:  Medications   ipratropium-albuterol (DUONEB) nebulizer solution (has no administration in time range)       COURSE & MEDICAL DECISION " MAKING  Blood culture reviewed and coag negative gram-positive's identified likely contaminant.  Negative for MRSA and MSSA by PCR.  This was from 1 of 2 blood cultures from the 21st.    Case discussed with pulmonary medicine.     This patient was called back for reassessment for a positive blood culture that appears to be coag negative staph.  This will not require intervention.  She has had a 2-week moderate asthma exacerbation.  She already seems to be on quite maximal therapy.  I discussed this with pulmonary medicine who suggested a higher dose and a more prolonged course of steroids with a taper.  He recommended outpatient follow-up with pulmonary medicine to which had already referred the patient.  The patient was told she could discontinue the antibiotic since her antibiotic was discontinued at discharge from the hospital after an ID consult and then resumed by urgent care the same day.  She also has diarrhea after courses of antibiotics and will be checked for C. difficile.  She has had C. difficile in the past.    PLAN:  Prednisone 60 mg for 6 days followed by 4-day tapers at 4020 and 10.    Asthma handout given  Return for worsening shortness of breath or ill appearance    Reinier Houser M.D.  [address] [place] [address] [address]  [facility] [id number]  [phone]    Schedule an appointment as soon as possible for a visit         CONDITION:  Good.    FINAL IMPRESSION:  1. Moderate persistent asthma with acute exacerbation          Electronically signed by: Joe Robertson M.D., 4/23/2021

## 2021-04-24 NOTE — ED PROVIDER NOTES
"ED Provider Note    Scribed for Terence Ruff M.D. by Kip Workman. 4/24/2021, 9:57 AM.    Primary care provider: Torres Brody M.D.  Means of arrival: Walk-In  History obtained from: Patient  History limited by: None    CHIEF COMPLAINT  Chief Complaint   Patient presents with   • Irregular Heart Beat     Pt states she went to a lab today to deliver sample for possible C-diff, pt states the HR monitor on her phone connected to her watch was showing a variable rate from  BPM. Hx A-fib, ablation 2016 at this hospital, hx SVT.    • Lightheadedness     Today associated with variable heart rate. Dx of bronchitis last week, states she was in the ICU for four days, hx asthma.        HPI  Kristin Balderrama is a 30 y.o. female who presents to the Emergency Department for evaluation of acute chest pain onset this morning. She having labs done this morning when her heart rate went up to 126 then dropped to 39 and finally stabilized around 60 according to the heart monitor from her apple watch. During that time she felt dizzy and states her \"legs felt like jello\". She was instructed to rpesent to the ED for further evlaution. She notes that she woke up this morning at around 4 AM with chest pain that she describes as pressure. She was seen in the ED yesterday for asthma exacerbation as well as diarrhea that onset 5 days ago. She was diagnosed with bronchitis and was suspected to have C.Diff. She was  She has had 5 episodes of diarrhea since this morning. She was also last admitted into the hospital on 04/15/21 for sepsis and asthma exacerbation and discharged four days later. The patient reports associated shortness of breath, dizziness, diarrhea, and abdominal pain. Negative for fever, chills, syncope, vomiting, hematochezia, or dysuria. No alleviating or exacerbating factors identified by the patient. The patient has an extensive medical history including diabetes, COPD, arrhyhtmia, and PCOS. Grandfather dies at 50 " due to cardiac complications.     REVIEW OF SYSTEMS  Pertinent positives include chest pain, shortness of breath, dizziness, diarrhea, and abdominal pain. Pertinent negatives include no fever, chills, syncope, vomiting, hematochezia, or dysuria.  All other systems reviewed and negative.    PAST MEDICAL HISTORY   has a past medical history of Abdominal pain, Anginal syndrome, Apnea, sleep, Arrhythmia, Arthritis, ASTHMA, Atrial fibrillation ([facility]), Back pain, Borderline personality disorder ([facility]), Breath shortness, Bronchitis, Cardiac arrhythmia, Chickenpox, Chronic UTI (9/18/2010), COPD (chronic obstructive pulmonary disease) ([facility]), Cough, Daytime sleepiness, Dental disorder (03/08/2021), Depression, Diabetes ([facility]), Diarrhea, Disorder of thyroid, Fall, Fatigue, Frequent headaches, Gasping for breath, Gynecological disorder, Headache(784.0), Heart burn, Heart murmur, History of falling, Indigestion, Migraine, 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    SURGICAL HISTORY   has a past surgical history that includes neuro dest facet l/s w/ig sngl (4/21/2015); recovery (1/27/2016); del[place] by laparoscopy (8/29/2010); lumbar fusion anterior (8/21/2012); other cardiac surgery (1/2016); tonsillectomy; bowel stimulator insertion (7/13/2016); gastroscopy with balloon dilatation (N/A, 1/4/2017); muscle biopsy (Right, 1/26/2017); other abdominal surgery; laminotomy; and bowel stimulator insertion (3/10/2021).    SOCIAL HISTORY  Social History     Tobacco Use   • Smoking status: Never Smoker   • Smokeless tobacco: Never Used    Substance Use Topics   • Alcohol use: No     Alcohol/week: 0.0 oz   • Drug use: Not Currently     Frequency: 7.0 times per week     Types: Marijuana      Social History     Substance and Sexual Activity   Drug Use Not Currently   • Frequency: 7.0 times per week   • Types: Marijuana       FAMILY HISTORY  Family History   Problem Relation Age of Onset   • Hypertension Mother    • Sleep Apnea Mother    • Heart Disease Mother    • Other Mother         hypothryod   • Hypertension Maternal Uncle    • Heart Disease Maternal Grandmother    • Hypertension Maternal Grandmother    • No Known Problems Sister    • Other Sister         Narcolepsy;fibromyalsia;bone;nerve   • Genitourinary () Problems Sister         endometriosis       CURRENT MEDICATIONS  Home Medications     Reviewed by Jorge Lora R.N. (Registered Nurse) on 04/24/21 at 0924  Med List Status: <None>   Medication Last Dose Status   albuterol 108 (90 Base) MCG/ACT Aero Soln inhalation aerosol  Active   aspirin 81 MG EC tablet  Active   baclofen (LIORESAL) 10 MG Tab  Active   benzonatate (TESSALON) 100 MG Cap  Active   busPIRone (BUSPAR) 30 MG tablet  Active   diphenhydrAMINE (BENADRYL) 25 MG Tab  Active   doxycycline (VIBRAMYCIN) 100 MG Tab  Active   Dulaglutide (TRULICITY) 1.5 MG/0.5ML Solution Pen-injector  Active   ferrous sulfate 325 (65 Fe) MG tablet  Active   fluoxetine (PROZAC) 40 MG capsule  Active   fluticasone (FLONASE) 50 MCG/ACT nasal spray  Active   fluticasone (FLONASE) 50 MCG/ACT nasal spray  Active   fluticasone (FLOVENT HFA) 220 MCG/ACT Aerosol  Active   gabapentin (NEURONTIN) 300 MG Cap  Active   ibuprofen (MOTRIN) 400 MG Tab  Active   ipratropium-albuterol (DUONEB) 0.5-2.5 (3) MG/3ML nebulizer solution  Active   ivabradine (CORLANOR) 7.5 MG Tab tablet  Active   levothyroxine (SYNTHROID) 100 MCG Tab  Active   Melatonin 10 MG Tab  Active   methocarbamol (ROBAXIN) 750 MG Tab  Active   metoclopramide (REGLAN) 10 MG Tab  Active   metoprolol  "SR (TOPROL XL) 25 MG TABLET SR 24 HR  Active   Mirabegron ER (MYRBETRIQ) 50 MG TABLET SR 24 HR  Active   mycophenolate (CELLCEPT) 500 MG tablet  Active   ondansetron (ZOFRAN) 4 MG/2ML Solution injection  Active   OXcarbazepine (TRILEPTAL) 300 MG Tab  Active   potassium chloride SA (KDUR) 20 MEQ Tab CR  Active   predniSONE (DELTASONE) 20 MG Tab  Active   pregabalin (LYRICA) 300 MG capsule  Active   sodium bicarbonate 325 MG Tab  Active   topiramate (TOPAMAX) 50 MG tablet  Active   traZODone (DESYREL) 100 MG Tab  Active   vitamin D, Ergocalciferol, (DRISDOL) 1.25 MG (49733 UT) Cap capsule  Active   ziprasidone (GEODON) 40 MG Cap  Active                ALLERGIES  Allergies   Allergen Reactions   • Depakote [Divalproex Sodium] Unspecified     Muscle spasms/muscle pain and weakness     • Amitriptyline Unspecified     Headaches     • Aripiprazole [Abilify] Unspecified     Headaches/muscle twitching     • Clindamycin Nausea     Even with food     • Flagyl [Metronidazole Hcl] Unspecified     \"eye problems\"     • Flomax [Tamsulosin Hydrochloride] Swelling   • Levaquin Unspecified     Severe muscle cramps in legs  RXN=unknown   • Metformin Unspecified     Increased lactic acid      • Tamsulosin Swelling     Swelling of legs   • Tape Rash     Tears skin off  coban with Tegaderm tape ok intermittently  RXN=ongoing   • Vancomycin Itching     Pt becomes flushed in face and chest.   RXN=7/10/16   • Wound Dressing Adhesive Hives     By pt report   • Ampicillin Rash     Pt reports that she received a rash    • Ciprofloxacin Rash         • Keflex Rash     Pt states she gets a rash with this medication  Tolerates ceftriaxone  Can take with Benadryl   • Levofloxacin Unspecified     Leg muscle cramps   • Metronidazole Rash     \"Vision problems\"   • Penicillins Hives     Can take with Benadryl   • Sulfa Drugs Itching and Myalgia     Muscle pain and weakness   • Valproic Acid Rash     .       PHYSICAL EXAM  VITAL SIGNS: /90   Pulse " "60   Temp 36.3 °C (97.3 °F) (Temporal)   Resp 18   Ht 1.651 m (5' 5\")   Wt 111 kg (244 lb 11.4 oz)   SpO2 97%   BMI 40.72 kg/m²     Constitutional: Well developed, Well nourished, No acute distress, Non-toxic appearance.   HENT: Normocephalic, Atraumatic, TMs normal, mucous membranes moist, no erythema, exudates, swelling, or masses, nares patent  Eyes: nonicteric  Neck: Supple, no meningismus  Lymphatic: No lymphadenopathy noted.   Cardiovascular: Bradycardic rate and regular rhythm, no gallops rubs or murmurs  Lungs: Clear bilaterally   Abdomen: Bowel sounds normal, Soft, Mild discomfort to left abdomen, no rebound or guarding, no distention  Skin: Warm, Dry, no rash  Back: No tenderness, No CVA tenderness.   Genitalia: Deferred  Rectal: Deferred  Extremities: No edema  Neurologic: Alert, appropriate, follows commands, moving all extremities, normal speech   Psychiatric: Affect normal      DIAGNOSTIC STUDIES / PROCEDURES    LABS  Results for orders placed or performed during the hospital encounter of 04/24/21   CBC WITH DIFFERENTIAL   Result Value Ref Range    WBC 6.7 4.8 - 10.8 K/uL    RBC 4.26 4.20 - 5.40 M/uL    Hemoglobin 12.2 12.0 - 16.0 g/dL    Hematocrit 39.2 37.0 - 47.0 %    MCV 92.0 81.4 - 97.8 fL    MCH 28.6 27.0 - 33.0 pg    MCHC 31.1 (L) 33.6 - 35.0 g/dL    RDW 47.6 35.9 - 50.0 fL    Platelet Count 188 164 - 446 K/uL    MPV 12.1 9.0 - 12.9 fL    Neutrophils-Polys 65.90 44.00 - 72.00 %    Lymphocytes 24.90 22.00 - 41.00 %    Monocytes 6.60 0.00 - 13.40 %    Eosinophils 1.30 0.00 - 6.90 %    Basophils 0.40 0.00 - 1.80 %    Immature Granulocytes 0.90 0.00 - 0.90 %    Nucleated RBC 0.00 /100 WBC    Neutrophils (Absolute) 4.41 2.00 - 7.15 K/uL    Lymphs (Absolute) 1.67 1.00 - 4.80 K/uL    Monos (Absolute) 0.44 0.00 - 0.85 K/uL    Eos (Absolute) 0.09 0.00 - 0.51 K/uL    Baso (Absolute) 0.03 0.00 - 0.12 K/uL    Immature Granulocytes (abs) 0.06 0.00 - 0.11 K/uL    NRBC (Absolute) 0.00 K/uL   COMP " METABOLIC PANEL   Result Value Ref Range    Sodium 135 135 - 145 mmol/L    Potassium 3.5 (L) 3.6 - 5.5 mmol/L    Chloride 102 96 - 112 mmol/L    Co2 21 20 - 33 mmol/L    Anion Gap 12.0 7.0 - 16.0    Glucose 85 65 - 99 mg/dL    Bun 15 8 - 22 mg/dL    Creatinine 0.64 0.50 - 1.40 mg/dL    Calcium 9.0 8.5 - 10.5 mg/dL    AST(SGOT) 10 (L) 12 - 45 U/L    ALT(SGPT) 29 2 - 50 U/L    Alkaline Phosphatase 82 30 - 99 U/L    Total Bilirubin 0.3 0.1 - 1.5 mg/dL    Albumin 4.3 3.2 - 4.9 g/dL    Total Protein 6.5 6.0 - 8.2 g/dL    Globulin 2.2 1.9 - 3.5 g/dL    A-G Ratio 2.0 g/dL   TROPONIN   Result Value Ref Range    Troponin T <6 6 - 19 ng/L   LIPASE   Result Value Ref Range    Lipase 44 11 - 82 U/L   HCG QUAL SERUM   Result Value Ref Range    Beta-Hcg Qualitative Serum Negative Negative   URINALYSIS (UA)    Specimen: Urine   Result Value Ref Range    Color Yellow     Character Clear     Specific Gravity >=1.030 <1.035    Ph 6.0 5.0 - 8.0    Glucose Negative Negative mg/dL    Ketones Negative Negative mg/dL    Protein Negative Negative mg/dL    Bilirubin Negative Negative    Urobilinogen, Urine 0.2 Negative    Nitrite Positive (A) Negative    Leukocyte Esterase Negative Negative    Occult Blood Negative Negative    Micro Urine Req Microscopic    ESTIMATED GFR   Result Value Ref Range    GFR If African American >60 >60 mL/min/1.73 m 2    GFR If Non African American >60 >60 mL/min/1.73 m 2   URINE MICROSCOPIC (W/UA)   Result Value Ref Range    WBC Rare /hpf    RBC Rare /hpf    Epithelial Cells Few /hpf    Mucous Threads Rare /hpf   EKG (NOW)   Result Value Ref Range    Report       [facility]    Test Date:  2021  Pt Name:    HARLEY DE LA CRUZ              Department: ER  MRN:        [id number]                      Room:  Gender:     Female                       Technician: [id number]  :        1990                   Requested By:ER TRIAGE PROTOCOL  Order #:    [id number]                     Reading MD:    Measurements  Intervals                                Axis  Rate:       59                           P:          23  KY:         146                          QRS:        19  QRSD:       96                           T:          11  QT:         450  QTc:        445    Interpretive Statements  Sinus bradycardia  Compared to ECG 04/18/2021 11:55:11  Sinus rhythm no longer present       *Note: Due to a large number of results and/or encounters for the requested time period, some results have not been displayed. A complete set of results can be found in Results Review.       All labs reviewed by me.    EKG  Obtained at 9:25 AM  Bradycardic Sinus rhythm   Rate 59  Axis normal   Intervals normal   No ST segment elevation or depression.   No ectopy     RADIOLOGY  DX-CHEST-PORTABLE (1 VIEW)   Final Result      No acute cardiopulmonary process is seen.        The radiologist's interpretation of all radiological studies have been reviewed by me.    COURSE & MEDICAL DECISION MAKING  Nursing notes, VS, PMSFHx reviewed in chart.     9:16 AM - Ordered for EKG to evaluate the patient.     9:57 AM - Patient seen and examined at bedside. Ordered for Urinalysis, HCG Qual Serum, Lipase, Troponin, CMP, CBC with Differential, and DX Chest to evaluate. Patient was treated with Zofran 4 mg, morphine 4 mg, and  mg for her symptoms. I will await lab and radiology results before determining whether interventions are necessary. The patient is agreeable and understanding of my plan of care.     12:09 PM - Ordered for Urine Culture to further evaluate the patient.    12:11 PM - Upon repeat evaluation, the patient is laying down in bed with stable vitals. I updated her on lab and radiology which were normal. I informed the patient of my plan for discharge, I provided precautions to return for any new or worsening symptoms. The patient verbalized understanding of discharge precautions and is agreeable to my plan.       Decision Making:  This is a 30 y.o. year old female who presents with apparent irregularity in her heart rate.  However, as she has been here her heart rate has been around 60 without any significant variation.  There has been no observable life-threatening arrhythmia, no evidence of SVT or A. fib.  EKG is reassuring with no ischemic findings.  Troponin is negative.  Electrolytes are reassuring.  Chest x-ray was clear.  Patient has a heart score of 2 for risk factors and 1 for history.  She already has an outpatient stress test scheduled.  She is followed by Dr. Antonio.  I will recommend she call Monday for follow-up.  At this point she appears to be low risk for 30-day Mace.  I do not have a strong suspicion for PE and she is PERC negative.  I do not suspect aortic dissection.     The patient will return for new or worsening symptoms and is stable at the time of discharge.    The patient is referred to a primary physician for blood pressure management, diabetic screening, and for all other preventative health concerns.      DISPOSITION:  Patient will be discharged home in stable condition.    FOLLOW UP:  John Leon M.D.  [address]  [address]  [facility] [id number] [phone]    Schedule an appointment as soon as possible for a visit today      Torres Brody M.D.  [address]  [facility] [id number] [phone]    Schedule an appointment as soon as possible for a visit today        OUTPATIENT MEDICATIONS:  New Prescriptions    No medications on file         FINAL IMPRESSION  1. Chest pain, unspecified type    2. Palpitations          IKip (Eva), am scribing for, and in the presence of, Terence Ruff M.D..    Electronically signed by: Kip Workman (Eva), 4/24/2021    Terence RODRIGUEZ M.D. personally performed the services described in this documentation, as scribed by Kip Workman in my presence, and it is both accurate and complete.    C.     The note accurately reflects work  and decisions made by me.  Terence Ruff M.D.  4/24/2021  12:20 PM

## 2021-04-24 NOTE — DISCHARGE INSTRUCTIONS
Asthma /Acute Bronchospasm     Use albuterol every 4-6 hours for shortness of breath, wheeze and cough.  Take steroid as prescribed today with a longer course instead of your current short course.  Continue inhaled steroid .  Return for worsening shortness of breath, ill appearance or if you need to use albuterol more often than every 4 hours.  See your doctor in 2 days if not much better.  Call Dr. Brody for results of the stool test.  Follow-up with pulmonary medicine clinic.  Turn for worsening shortness of breath or ill appearance.  You can stop the antibiotic.    You had an elevated or high normal blood pressure reading today.  This does not necessarily mean you have hypertension.  Please followup with your/a primary physician for comprehensive blood pressure evaluation.  BP Readings from Last 3 Encounters:   04/23/21 126/79   04/21/21 116/64   04/21/21 108/74       Your exam shows you have asthma, or acute bronchospasm that acts like asthma. Bronchospasm means your air passages become narrowed. These conditions are due to inflammation and airway spasm that cause narrowing of the bronchial tubes in the lungs. This causes you to have wheezing and shortness of breath.     CAUSES  Respiratory infections and allergies most often bring on these attacks. Smoking, air pollution, cold air, emotional upsets, and vigorous exercise can also bring them on.      TREATMENT  Ø Treatment is aimed at making the narrowed airways larger. Mild asthma/bronchospasm is usually controlled with inhaled medicines. Albuterol is a common medicine that you breathe in to open spastic or narrowed airways. Some trade names for albuterol are Ventolin or Proventil.  Steroid medicine is also used to reduce the inflammation when an attack is moderate or severe. Antibiotics (medications used to kill germs) are only used if a bacterial infection is present.   Ø If you are pregnant and need to use Albuterol (Ventolin or Proventil), you can expect  the baby to move more than usual shortly after the medicine is used.      HOME CARE INSTRUCTIONS  Ø Rest.  Ø Drink plenty of liquids. This helps the mucous to remain thin and easily coughed up. Do not use caffeine or alcohol.  Ø Do not smoke. Avoid being exposed to second-hand smoke.    Ø You play a critical role in keeping yourself in good health. Avoid exposure to things that cause you to wheeze. Avoid exposure to things that cause you to have breathing problems. Keep your medications up-to-date and available. Carefully follow your doctor’s treatment plan.      Ø When pollen or pollution is bad, keep windows closed and use an air conditioner go to places with air conditioning. If you are allergic to furry pets or birds, find new homes for them or keep them outside.  Ø Take your medicine exactly as prescribed.  Ø Asthma requires careful medical attention. See your caregiver for follow-up as advised. If you are  more than 24 weeks pregnant and you were prescribed any new medications, let your Obstetrician know about the visit and how you are doing. Arrange a recheck.     SEEK IMMEDIATE MEDICAL CARE IF:  Ø You are getting worse.Ø You have trouble breathing. If severe, call 911.  Ø You develop chest pain or discomfort.  Ø You are throwing up or not drinking fluids. Ø You are not getting better within 24 hours.Ø You are coughing up yellow, green, brown, or bloody sputum.  Ø You develop a fever over 102º F (38.9º C).  Ø You have trouble swallowing.      Document Released: 04/03/2008  Document Re-Released: 06/15/2009  [employer]® Patient Information ©2009 [employer].

## 2021-04-26 ENCOUNTER — APPOINTMENT (OUTPATIENT)
Dept: RADIOLOGY | Facility: MEDICAL CENTER | Age: 31
DRG: 202 | End: 2021-04-26
Attending: STUDENT IN AN ORGANIZED HEALTH CARE EDUCATION/TRAINING PROGRAM
Payer: MEDICARE

## 2021-04-26 ENCOUNTER — APPOINTMENT (OUTPATIENT)
Dept: RADIOLOGY | Facility: MEDICAL CENTER | Age: 31
DRG: 202 | End: 2021-04-26
Attending: EMERGENCY MEDICINE
Payer: MEDICARE

## 2021-04-26 ENCOUNTER — HOSPITAL ENCOUNTER (INPATIENT)
Facility: MEDICAL CENTER | Age: 31
LOS: 1 days | DRG: 202 | End: 2021-04-28
Attending: EMERGENCY MEDICINE | Admitting: HOSPITALIST
Payer: MEDICARE

## 2021-04-26 DIAGNOSIS — R06.03 ACUTE RESPIRATORY DISTRESS: ICD-10-CM

## 2021-04-26 DIAGNOSIS — J45.41 MODERATE PERSISTENT ASTHMA WITH ACUTE EXACERBATION: ICD-10-CM

## 2021-04-26 PROBLEM — I20.89 ANGINA OF EFFORT (HCC): Status: ACTIVE | Noted: 2021-04-26

## 2021-04-26 LAB
ALBUMIN SERPL BCP-MCNC: 4 G/DL (ref 3.2–4.9)
ALBUMIN/GLOB SERPL: 1.8 G/DL
ALP SERPL-CCNC: 83 U/L (ref 30–99)
ALT SERPL-CCNC: 37 U/L (ref 2–50)
ANION GAP SERPL CALC-SCNC: 15 MMOL/L (ref 7–16)
AST SERPL-CCNC: 14 U/L (ref 12–45)
BACTERIA BLD CULT: NORMAL
BACTERIA UR CULT: NORMAL
BASOPHILS # BLD AUTO: 0.4 % (ref 0–1.8)
BASOPHILS # BLD: 0.02 K/UL (ref 0–0.12)
BILIRUB SERPL-MCNC: 0.3 MG/DL (ref 0.1–1.5)
BUN SERPL-MCNC: 10 MG/DL (ref 8–22)
CALCIUM SERPL-MCNC: 9.3 MG/DL (ref 8.5–10.5)
CHLORIDE SERPL-SCNC: 102 MMOL/L (ref 96–112)
CO2 SERPL-SCNC: 20 MMOL/L (ref 20–33)
CREAT SERPL-MCNC: 0.84 MG/DL (ref 0.5–1.4)
EKG IMPRESSION: NORMAL
EOSINOPHIL # BLD AUTO: 0.03 K/UL (ref 0–0.51)
EOSINOPHIL NFR BLD: 0.6 % (ref 0–6.9)
ERYTHROCYTE [DISTWIDTH] IN BLOOD BY AUTOMATED COUNT: 45.4 FL (ref 35.9–50)
FLUAV RNA SPEC QL NAA+PROBE: NEGATIVE
FLUBV RNA SPEC QL NAA+PROBE: NEGATIVE
GLOBULIN SER CALC-MCNC: 2.2 G/DL (ref 1.9–3.5)
GLUCOSE BLD-MCNC: 156 MG/DL (ref 65–99)
GLUCOSE BLD-MCNC: 159 MG/DL (ref 65–99)
GLUCOSE BLD-MCNC: 282 MG/DL (ref 65–99)
GLUCOSE SERPL-MCNC: 132 MG/DL (ref 65–99)
HCT VFR BLD AUTO: 37.7 % (ref 37–47)
HGB BLD-MCNC: 12.1 G/DL (ref 12–16)
IMM GRANULOCYTES # BLD AUTO: 0.05 K/UL (ref 0–0.11)
IMM GRANULOCYTES NFR BLD AUTO: 1 % (ref 0–0.9)
LIPASE SERPL-CCNC: 25 U/L (ref 11–82)
LYMPHOCYTES # BLD AUTO: 1.54 K/UL (ref 1–4.8)
LYMPHOCYTES NFR BLD: 29.5 % (ref 22–41)
MCH RBC QN AUTO: 29.4 PG (ref 27–33)
MCHC RBC AUTO-ENTMCNC: 32.1 G/DL (ref 33.6–35)
MCV RBC AUTO: 91.5 FL (ref 81.4–97.8)
MONOCYTES # BLD AUTO: 0.31 K/UL (ref 0–0.85)
MONOCYTES NFR BLD AUTO: 5.9 % (ref 0–13.4)
NEUTROPHILS # BLD AUTO: 3.27 K/UL (ref 2–7.15)
NEUTROPHILS NFR BLD: 62.6 % (ref 44–72)
NRBC # BLD AUTO: 0 K/UL
NRBC BLD-RTO: 0 /100 WBC
PLATELET # BLD AUTO: 111 K/UL (ref 164–446)
PMV BLD AUTO: 11.8 FL (ref 9–12.9)
POTASSIUM SERPL-SCNC: 3.2 MMOL/L (ref 3.6–5.5)
PROT SERPL-MCNC: 6.2 G/DL (ref 6–8.2)
RBC # BLD AUTO: 4.12 M/UL (ref 4.2–5.4)
RSV RNA SPEC QL NAA+PROBE: NEGATIVE
S PYO DNA SPEC NAA+PROBE: NOT DETECTED
SARS-COV-2 RNA RESP QL NAA+PROBE: NOTDETECTED
SIGNIFICANT IND 70042: NORMAL
SIGNIFICANT IND 70042: NORMAL
SITE SITE: NORMAL
SITE SITE: NORMAL
SODIUM SERPL-SCNC: 137 MMOL/L (ref 135–145)
SOURCE SOURCE: NORMAL
SOURCE SOURCE: NORMAL
SPECIMEN SOURCE: NORMAL
TROPONIN T SERPL-MCNC: <6 NG/L (ref 6–19)
WBC # BLD AUTO: 5.2 K/UL (ref 4.8–10.8)

## 2021-04-26 PROCEDURE — 82962 GLUCOSE BLOOD TEST: CPT

## 2021-04-26 PROCEDURE — 83690 ASSAY OF LIPASE: CPT

## 2021-04-26 PROCEDURE — 96372 THER/PROPH/DIAG INJ SC/IM: CPT

## 2021-04-26 PROCEDURE — 700111 HCHG RX REV CODE 636 W/ 250 OVERRIDE (IP): Performed by: STUDENT IN AN ORGANIZED HEALTH CARE EDUCATION/TRAINING PROGRAM

## 2021-04-26 PROCEDURE — 94640 AIRWAY INHALATION TREATMENT: CPT

## 2021-04-26 PROCEDURE — 700101 HCHG RX REV CODE 250: Performed by: EMERGENCY MEDICINE

## 2021-04-26 PROCEDURE — 700111 HCHG RX REV CODE 636 W/ 250 OVERRIDE (IP): Performed by: EMERGENCY MEDICINE

## 2021-04-26 PROCEDURE — 96376 TX/PRO/DX INJ SAME DRUG ADON: CPT

## 2021-04-26 PROCEDURE — G0378 HOSPITAL OBSERVATION PER HR: HCPCS

## 2021-04-26 PROCEDURE — 0241U HCHG SARS-COV-2 COVID-19 NFCT DS RESP RNA 4 TRGT MIC: CPT

## 2021-04-26 PROCEDURE — 85025 COMPLETE CBC W/AUTO DIFF WBC: CPT

## 2021-04-26 PROCEDURE — 96375 TX/PRO/DX INJ NEW DRUG ADDON: CPT

## 2021-04-26 PROCEDURE — 36415 COLL VENOUS BLD VENIPUNCTURE: CPT

## 2021-04-26 PROCEDURE — 700102 HCHG RX REV CODE 250 W/ 637 OVERRIDE(OP): Performed by: STUDENT IN AN ORGANIZED HEALTH CARE EDUCATION/TRAINING PROGRAM

## 2021-04-26 PROCEDURE — 99285 EMERGENCY DEPT VISIT HI MDM: CPT

## 2021-04-26 PROCEDURE — 700101 HCHG RX REV CODE 250: Performed by: STUDENT IN AN ORGANIZED HEALTH CARE EDUCATION/TRAINING PROGRAM

## 2021-04-26 PROCEDURE — 700101 HCHG RX REV CODE 250: Performed by: HOSPITALIST

## 2021-04-26 PROCEDURE — 94660 CPAP INITIATION&MGMT: CPT

## 2021-04-26 PROCEDURE — 71260 CT THORAX DX C+: CPT | Mod: MG

## 2021-04-26 PROCEDURE — 700102 HCHG RX REV CODE 250 W/ 637 OVERRIDE(OP): Performed by: EMERGENCY MEDICINE

## 2021-04-26 PROCEDURE — 96374 THER/PROPH/DIAG INJ IV PUSH: CPT

## 2021-04-26 PROCEDURE — 5A09457 ASSISTANCE WITH RESPIRATORY VENTILATION, 24-96 CONSECUTIVE HOURS, CONTINUOUS POSITIVE AIRWAY PRESSURE: ICD-10-PCS | Performed by: INTERNAL MEDICINE

## 2021-04-26 PROCEDURE — A9270 NON-COVERED ITEM OR SERVICE: HCPCS | Performed by: EMERGENCY MEDICINE

## 2021-04-26 PROCEDURE — 99220 PR INITIAL OBSERVATION CARE,LEVL III: CPT | Mod: GC | Performed by: HOSPITALIST

## 2021-04-26 PROCEDURE — 80048 BASIC METABOLIC PNL TOTAL CA: CPT

## 2021-04-26 PROCEDURE — A9270 NON-COVERED ITEM OR SERVICE: HCPCS | Performed by: STUDENT IN AN ORGANIZED HEALTH CARE EDUCATION/TRAINING PROGRAM

## 2021-04-26 PROCEDURE — 84484 ASSAY OF TROPONIN QUANT: CPT

## 2021-04-26 PROCEDURE — 70360 X-RAY EXAM OF NECK: CPT

## 2021-04-26 PROCEDURE — 71045 X-RAY EXAM CHEST 1 VIEW: CPT

## 2021-04-26 PROCEDURE — 94760 N-INVAS EAR/PLS OXIMETRY 1: CPT

## 2021-04-26 PROCEDURE — 700117 HCHG RX CONTRAST REV CODE 255: Performed by: STUDENT IN AN ORGANIZED HEALTH CARE EDUCATION/TRAINING PROGRAM

## 2021-04-26 PROCEDURE — 80053 COMPREHEN METABOLIC PANEL: CPT

## 2021-04-26 PROCEDURE — 93005 ELECTROCARDIOGRAM TRACING: CPT | Performed by: EMERGENCY MEDICINE

## 2021-04-26 PROCEDURE — 83735 ASSAY OF MAGNESIUM: CPT

## 2021-04-26 PROCEDURE — C9803 HOPD COVID-19 SPEC COLLECT: HCPCS | Performed by: EMERGENCY MEDICINE

## 2021-04-26 PROCEDURE — 87651 STREP A DNA AMP PROBE: CPT

## 2021-04-26 RX ORDER — FLUTICASONE PROPIONATE 110 UG/1
1 AEROSOL, METERED RESPIRATORY (INHALATION) 2 TIMES DAILY
Status: DISCONTINUED | OUTPATIENT
Start: 2021-04-26 | End: 2021-04-27

## 2021-04-26 RX ORDER — IBUPROFEN 800 MG/1
400 TABLET ORAL EVERY 8 HOURS PRN
Status: DISCONTINUED | OUTPATIENT
Start: 2021-04-26 | End: 2021-04-27

## 2021-04-26 RX ORDER — ERGOCALCIFEROL 1.25 MG/1
50000 CAPSULE ORAL
Status: DISCONTINUED | OUTPATIENT
Start: 2021-04-30 | End: 2021-04-28 | Stop reason: HOSPADM

## 2021-04-26 RX ORDER — ALBUTEROL SULFATE 90 UG/1
2 AEROSOL, METERED RESPIRATORY (INHALATION) EVERY 6 HOURS PRN
Status: DISCONTINUED | OUTPATIENT
Start: 2021-04-26 | End: 2021-04-26

## 2021-04-26 RX ORDER — CHOLECALCIFEROL (VITAMIN D3) 125 MCG
10 CAPSULE ORAL
Status: DISCONTINUED | OUTPATIENT
Start: 2021-04-26 | End: 2021-04-27

## 2021-04-26 RX ORDER — TOPIRAMATE 25 MG/1
50 TABLET ORAL 2 TIMES DAILY
Status: DISCONTINUED | OUTPATIENT
Start: 2021-04-26 | End: 2021-04-28 | Stop reason: HOSPADM

## 2021-04-26 RX ORDER — IPRATROPIUM BROMIDE AND ALBUTEROL SULFATE 2.5; .5 MG/3ML; MG/3ML
3 SOLUTION RESPIRATORY (INHALATION) EVERY 4 HOURS PRN
Status: DISCONTINUED | OUTPATIENT
Start: 2021-04-26 | End: 2021-04-28 | Stop reason: HOSPADM

## 2021-04-26 RX ORDER — ALBUTEROL SULFATE 90 UG/1
2 AEROSOL, METERED RESPIRATORY (INHALATION) EVERY 6 HOURS PRN
Status: DISCONTINUED | OUTPATIENT
Start: 2021-04-26 | End: 2021-04-27

## 2021-04-26 RX ORDER — AMOXICILLIN 250 MG
2 CAPSULE ORAL 2 TIMES DAILY
Status: DISCONTINUED | OUTPATIENT
Start: 2021-04-26 | End: 2021-04-27

## 2021-04-26 RX ORDER — METHYLPREDNISOLONE SODIUM SUCCINATE 40 MG/ML
40 INJECTION, POWDER, LYOPHILIZED, FOR SOLUTION INTRAMUSCULAR; INTRAVENOUS EVERY 12 HOURS
Status: DISCONTINUED | OUTPATIENT
Start: 2021-04-26 | End: 2021-04-28 | Stop reason: HOSPADM

## 2021-04-26 RX ORDER — BUSPIRONE HYDROCHLORIDE 10 MG/1
10 TABLET ORAL 3 TIMES DAILY
Status: DISCONTINUED | OUTPATIENT
Start: 2021-04-26 | End: 2021-04-28 | Stop reason: HOSPADM

## 2021-04-26 RX ORDER — GUAIFENESIN/DEXTROMETHORPHAN 100-10MG/5
10 SYRUP ORAL EVERY 6 HOURS PRN
Status: DISCONTINUED | OUTPATIENT
Start: 2021-04-26 | End: 2021-04-28 | Stop reason: HOSPADM

## 2021-04-26 RX ORDER — FERROUS SULFATE 325(65) MG
325 TABLET ORAL
Status: DISCONTINUED | OUTPATIENT
Start: 2021-04-27 | End: 2021-04-28 | Stop reason: HOSPADM

## 2021-04-26 RX ORDER — METHYLPREDNISOLONE SODIUM SUCCINATE 125 MG/2ML
125 INJECTION, POWDER, LYOPHILIZED, FOR SOLUTION INTRAMUSCULAR; INTRAVENOUS ONCE
Status: COMPLETED | OUTPATIENT
Start: 2021-04-26 | End: 2021-04-26

## 2021-04-26 RX ORDER — BISACODYL 10 MG
10 SUPPOSITORY, RECTAL RECTAL
Status: DISCONTINUED | OUTPATIENT
Start: 2021-04-26 | End: 2021-04-27

## 2021-04-26 RX ORDER — METHOCARBAMOL 750 MG/1
750 TABLET, FILM COATED ORAL EVERY 4 HOURS PRN
Status: DISCONTINUED | OUTPATIENT
Start: 2021-04-26 | End: 2021-04-26

## 2021-04-26 RX ORDER — FLUOXETINE HYDROCHLORIDE 20 MG/1
40 CAPSULE ORAL DAILY
Status: DISCONTINUED | OUTPATIENT
Start: 2021-04-26 | End: 2021-04-28 | Stop reason: HOSPADM

## 2021-04-26 RX ORDER — GABAPENTIN 300 MG/1
300 CAPSULE ORAL 3 TIMES DAILY
Status: DISCONTINUED | OUTPATIENT
Start: 2021-04-26 | End: 2021-04-26

## 2021-04-26 RX ORDER — IBUPROFEN 800 MG/1
400 TABLET ORAL EVERY 6 HOURS PRN
Status: DISCONTINUED | OUTPATIENT
Start: 2021-04-26 | End: 2021-04-26

## 2021-04-26 RX ORDER — TRAZODONE HYDROCHLORIDE 50 MG/1
50 TABLET ORAL
Status: COMPLETED | OUTPATIENT
Start: 2021-04-26 | End: 2021-04-26

## 2021-04-26 RX ORDER — LEVOTHYROXINE SODIUM 0.1 MG/1
100 TABLET ORAL
Status: DISCONTINUED | OUTPATIENT
Start: 2021-04-26 | End: 2021-04-28 | Stop reason: HOSPADM

## 2021-04-26 RX ORDER — POLYETHYLENE GLYCOL 3350 17 G/17G
1 POWDER, FOR SOLUTION ORAL
Status: DISCONTINUED | OUTPATIENT
Start: 2021-04-26 | End: 2021-04-27

## 2021-04-26 RX ORDER — BUSPIRONE HYDROCHLORIDE 30 MG/1
30 TABLET ORAL 2 TIMES DAILY
COMMUNITY
End: 2021-12-01

## 2021-04-26 RX ORDER — ZIPRASIDONE HYDROCHLORIDE 40 MG/1
40 CAPSULE ORAL 2 TIMES DAILY
Status: DISCONTINUED | OUTPATIENT
Start: 2021-04-26 | End: 2021-04-28 | Stop reason: HOSPADM

## 2021-04-26 RX ORDER — DIPHENHYDRAMINE HCL 25 MG
25 TABLET ORAL NIGHTLY PRN
Status: DISCONTINUED | OUTPATIENT
Start: 2021-04-26 | End: 2021-04-26

## 2021-04-26 RX ORDER — ACETAMINOPHEN 500 MG
1000 TABLET ORAL EVERY 6 HOURS PRN
Status: DISCONTINUED | OUTPATIENT
Start: 2021-04-26 | End: 2021-04-28 | Stop reason: HOSPADM

## 2021-04-26 RX ORDER — SODIUM BICARBONATE 650 MG/1
650 TABLET ORAL 2 TIMES DAILY
Status: DISCONTINUED | OUTPATIENT
Start: 2021-04-26 | End: 2021-04-28 | Stop reason: HOSPADM

## 2021-04-26 RX ORDER — GABAPENTIN 400 MG/1
400 CAPSULE ORAL 3 TIMES DAILY
Status: DISCONTINUED | OUTPATIENT
Start: 2021-04-26 | End: 2021-04-28 | Stop reason: HOSPADM

## 2021-04-26 RX ORDER — MYCOPHENOLATE MOFETIL 250 MG/1
500 CAPSULE ORAL 2 TIMES DAILY
Status: DISCONTINUED | OUTPATIENT
Start: 2021-04-26 | End: 2021-04-28 | Stop reason: HOSPADM

## 2021-04-26 RX ORDER — PROCHLORPERAZINE EDISYLATE 5 MG/ML
10 INJECTION INTRAMUSCULAR; INTRAVENOUS EVERY 6 HOURS PRN
Status: DISCONTINUED | OUTPATIENT
Start: 2021-04-26 | End: 2021-04-28 | Stop reason: HOSPADM

## 2021-04-26 RX ORDER — DEXTROSE MONOHYDRATE 25 G/50ML
50 INJECTION, SOLUTION INTRAVENOUS
Status: DISCONTINUED | OUTPATIENT
Start: 2021-04-26 | End: 2021-04-27

## 2021-04-26 RX ORDER — ONDANSETRON 2 MG/ML
4 INJECTION INTRAMUSCULAR; INTRAVENOUS EVERY 4 HOURS PRN
Status: DISCONTINUED | OUTPATIENT
Start: 2021-04-26 | End: 2021-04-28 | Stop reason: HOSPADM

## 2021-04-26 RX ORDER — IBUPROFEN 600 MG/1
600 TABLET ORAL ONCE
Status: COMPLETED | OUTPATIENT
Start: 2021-04-26 | End: 2021-04-26

## 2021-04-26 RX ORDER — PREGABALIN 150 MG/1
300 CAPSULE ORAL 2 TIMES DAILY
Status: DISCONTINUED | OUTPATIENT
Start: 2021-04-26 | End: 2021-04-26

## 2021-04-26 RX ORDER — PREDNISONE 20 MG/1
60 TABLET ORAL DAILY
Status: DISCONTINUED | OUTPATIENT
Start: 2021-04-27 | End: 2021-04-26

## 2021-04-26 RX ORDER — METOPROLOL SUCCINATE 25 MG/1
12.5 TABLET, EXTENDED RELEASE ORAL EVERY EVENING
Status: DISCONTINUED | OUTPATIENT
Start: 2021-04-26 | End: 2021-04-28 | Stop reason: HOSPADM

## 2021-04-26 RX ORDER — OXCARBAZEPINE 300 MG/1
300 TABLET, FILM COATED ORAL 2 TIMES DAILY
Status: DISCONTINUED | OUTPATIENT
Start: 2021-04-26 | End: 2021-04-28 | Stop reason: HOSPADM

## 2021-04-26 RX ORDER — BACLOFEN 10 MG/1
10 TABLET ORAL 3 TIMES DAILY
Status: DISCONTINUED | OUTPATIENT
Start: 2021-04-26 | End: 2021-04-28 | Stop reason: HOSPADM

## 2021-04-26 RX ORDER — POTASSIUM CHLORIDE 20 MEQ/1
40 TABLET, EXTENDED RELEASE ORAL 2 TIMES DAILY
Status: DISCONTINUED | OUTPATIENT
Start: 2021-04-26 | End: 2021-04-28

## 2021-04-26 RX ORDER — TRAZODONE HYDROCHLORIDE 100 MG/1
100 TABLET ORAL
Status: DISCONTINUED | OUTPATIENT
Start: 2021-04-26 | End: 2021-04-26

## 2021-04-26 RX ADMIN — IBUPROFEN 600 MG: 600 TABLET, FILM COATED ORAL at 07:53

## 2021-04-26 RX ADMIN — BUSPIRONE HYDROCHLORIDE 10 MG: 10 TABLET ORAL at 12:35

## 2021-04-26 RX ADMIN — IVABRADINE 7.5 MG: 7.5 TABLET, FILM COATED ORAL at 18:00

## 2021-04-26 RX ADMIN — METOPROLOL SUCCINATE 12.5 MG: 25 TABLET, EXTENDED RELEASE ORAL at 18:00

## 2021-04-26 RX ADMIN — PROCHLORPERAZINE EDISYLATE 10 MG: 5 INJECTION INTRAMUSCULAR; INTRAVENOUS at 21:19

## 2021-04-26 RX ADMIN — ASPIRIN 81 MG: 81 TABLET, COATED ORAL at 12:35

## 2021-04-26 RX ADMIN — ENOXAPARIN SODIUM 30 MG: 30 INJECTION SUBCUTANEOUS at 12:36

## 2021-04-26 RX ADMIN — METHYLPREDNISOLONE SODIUM SUCCINATE 40 MG: 40 INJECTION, POWDER, FOR SOLUTION INTRAMUSCULAR; INTRAVENOUS at 17:59

## 2021-04-26 RX ADMIN — SODIUM BICARBONATE 650 MG: 650 TABLET ORAL at 18:00

## 2021-04-26 RX ADMIN — ZIPRASIDONE HYDROCHLORIDE 40 MG: 40 CAPSULE ORAL at 18:01

## 2021-04-26 RX ADMIN — IPRATROPIUM BROMIDE AND ALBUTEROL SULFATE 3 ML: .5; 2.5 SOLUTION RESPIRATORY (INHALATION) at 14:08

## 2021-04-26 RX ADMIN — METHYLPREDNISOLONE SODIUM SUCCINATE 125 MG: 125 INJECTION, POWDER, FOR SOLUTION INTRAMUSCULAR; INTRAVENOUS at 07:01

## 2021-04-26 RX ADMIN — BACLOFEN 10 MG: 10 TABLET ORAL at 18:00

## 2021-04-26 RX ADMIN — IOHEXOL 75 ML: 350 INJECTION, SOLUTION INTRAVENOUS at 16:30

## 2021-04-26 RX ADMIN — GABAPENTIN 400 MG: 400 CAPSULE ORAL at 17:59

## 2021-04-26 RX ADMIN — FLUOXETINE 40 MG: 20 CAPSULE ORAL at 12:36

## 2021-04-26 RX ADMIN — ALBUTEROL SULFATE 2.5 MG: 2.5 SOLUTION RESPIRATORY (INHALATION) at 18:17

## 2021-04-26 RX ADMIN — TOPIRAMATE 50 MG: 25 TABLET, FILM COATED ORAL at 18:00

## 2021-04-26 RX ADMIN — POTASSIUM CHLORIDE 40 MEQ: 1500 TABLET, EXTENDED RELEASE ORAL at 12:35

## 2021-04-26 RX ADMIN — FLUTICASONE PROPIONATE 110 MCG: 110 AEROSOL, METERED RESPIRATORY (INHALATION) at 18:12

## 2021-04-26 RX ADMIN — GABAPENTIN 400 MG: 400 CAPSULE ORAL at 12:36

## 2021-04-26 RX ADMIN — INSULIN HUMAN 2 UNITS: 100 INJECTION, SOLUTION PARENTERAL at 18:01

## 2021-04-26 RX ADMIN — MYCOPHENOLATE MOFETIL 500 MG: 250 CAPSULE ORAL at 18:01

## 2021-04-26 RX ADMIN — TRAZODONE HYDROCHLORIDE 50 MG: 50 TABLET ORAL at 22:55

## 2021-04-26 RX ADMIN — BUSPIRONE HYDROCHLORIDE 10 MG: 10 TABLET ORAL at 18:00

## 2021-04-26 RX ADMIN — Medication 10 MG: at 22:05

## 2021-04-26 RX ADMIN — INSULIN HUMAN 2 UNITS: 100 INJECTION, SOLUTION PARENTERAL at 22:18

## 2021-04-26 RX ADMIN — POTASSIUM CHLORIDE 40 MEQ: 1500 TABLET, EXTENDED RELEASE ORAL at 22:05

## 2021-04-26 RX ADMIN — SODIUM BICARBONATE 650 MG: 650 TABLET ORAL at 12:35

## 2021-04-26 RX ADMIN — OXCARBAZEPINE 300 MG: 300 TABLET, FILM COATED ORAL at 18:01

## 2021-04-26 RX ADMIN — METHYLPREDNISOLONE SODIUM SUCCINATE 40 MG: 40 INJECTION, POWDER, FOR SOLUTION INTRAMUSCULAR; INTRAVENOUS at 09:45

## 2021-04-26 RX ADMIN — BACLOFEN 10 MG: 10 TABLET ORAL at 12:35

## 2021-04-26 RX ADMIN — RACEPINEPHRINE HYDROCHLORIDE 0.5 ML: 11.25 SOLUTION RESPIRATORY (INHALATION) at 06:57

## 2021-04-26 RX ADMIN — ALBUTEROL SULFATE 15 MG/HR: 5 SOLUTION RESPIRATORY (INHALATION) at 08:15

## 2021-04-26 ASSESSMENT — LIFESTYLE VARIABLES
AVERAGE NUMBER OF DAYS PER WEEK YOU HAVE A DRINK CONTAINING ALCOHOL: 0
HAVE YOU EVER FELT YOU SHOULD CUT DOWN ON YOUR DRINKING: NO
ON A TYPICAL DAY WHEN YOU DRINK ALCOHOL HOW MANY DRINKS DO YOU HAVE: 0
EVER FELT BAD OR GUILTY ABOUT YOUR DRINKING: NO
DOES PATIENT WANT TO STOP DRINKING: NO
HOW MANY TIMES IN THE PAST YEAR HAVE YOU HAD 5 OR MORE DRINKS IN A DAY: 0
CONSUMPTION TOTAL: INCOMPLETE
ALCOHOL_USE: NO
HAVE PEOPLE ANNOYED YOU BY CRITICIZING YOUR DRINKING: NO

## 2021-04-26 ASSESSMENT — ENCOUNTER SYMPTOMS
MYALGIAS: 1
SHORTNESS OF BREATH: 1
VOMITING: 0
DIZZINESS: 0
HEADACHES: 0
HEMOPTYSIS: 0
DIARRHEA: 0
BLOOD IN STOOL: 0
FALLS: 0
FEVER: 0
DIAPHORESIS: 0
CONSTIPATION: 0
NAUSEA: 1
PALPITATIONS: 1
EYES NEGATIVE: 1
ABDOMINAL PAIN: 1
WHEEZING: 1
SPEECH CHANGE: 0
FLANK PAIN: 0
CHILLS: 1
HEARTBURN: 0
EYE PAIN: 0
COUGH: 1
SPUTUM PRODUCTION: 1
NEUROLOGICAL NEGATIVE: 1
NECK PAIN: 0
BLURRED VISION: 0

## 2021-04-26 ASSESSMENT — COGNITIVE AND FUNCTIONAL STATUS - GENERAL
SUGGESTED CMS G CODE MODIFIER DAILY ACTIVITY: CH
CLIMB 3 TO 5 STEPS WITH RAILING: A LOT
WALKING IN HOSPITAL ROOM: A LITTLE
MOBILITY SCORE: 21
DAILY ACTIVITIY SCORE: 24
SUGGESTED CMS G CODE MODIFIER MOBILITY: CJ

## 2021-04-26 ASSESSMENT — PAIN DESCRIPTION - PAIN TYPE
TYPE: ACUTE PAIN
TYPE: CHRONIC PAIN

## 2021-04-26 ASSESSMENT — FIBROSIS 4 INDEX
FIB4 SCORE: 0.31
FIB4 SCORE: 0.64
FIB4 SCORE: 0.64

## 2021-04-26 ASSESSMENT — COPD QUESTIONNAIRES
COPD SCREENING SCORE: 4
DO YOU EVER COUGH UP ANY MUCUS OR PHLEGM?: YES, A FEW DAYS A WEEK OR MONTH
HAVE YOU SMOKED AT LEAST 100 CIGARETTES IN YOUR ENTIRE LIFE: NO/DON'T KNOW
DURING THE PAST 4 WEEKS HOW MUCH DID YOU FEEL SHORT OF BREATH: MOST  OR ALL OF THE TIME

## 2021-04-26 NOTE — ASSESSMENT & PLAN NOTE
She has a history of asthma and is on bronchodilator therapy at home.  Her last PFT done in 2019 showed FEV1 at 79% of predicted and FVC at 76% of predicted with an FEV1/FVC ratio of 88.  DLCO 85%.  This is consistent with mild obstructive disease. Also concern for obesity hypoventilation syndrome, atelectasis. This episode could have been exacerbated with dog exposure which she is allergic to and she also ran out of duoneb and flovent inhalers.     Plan:  -advair, duoneb, albuterol on discharge. Holding off on adding spiriva for now.  -no steroid on discharge.  -She has tried taking montelukast and end up having cardiac effusion.   -Follow up with PCP and pulmonologist.

## 2021-04-26 NOTE — ASSESSMENT & PLAN NOTE
She is on multiple medications. Explained her that she is on too many medications and need to cut down on few medications such as she is on lyrica and gabapentin both for neuropathy. Increased gabapentin and stopped lyrica.  - She is on baclofen and robaxin for muscle spasms. Stopped robaxin. Can increase baclofen is asked.   - She is on melatonin, benadryl and trazodone for sleep. Stopped benadryl and trazodone and continued her on melatonin.   - Polypharmacy is an issue here.

## 2021-04-26 NOTE — RESPIRATORY CARE
Respiratory Therapy Update       O2 (LPM): 0 (04/26/21 0815)       Breath Sounds  RUL Breath Sounds: Clear (04/26/21 0815)  RML Breath Sounds: Clear (04/26/21 0815)  RLL Breath Sounds: Diminished (04/26/21 0815)  MARY Breath Sounds: Clear (04/26/21 0815)  LLL Breath Sounds: Diminished (04/26/21 0815)      Events/Summary/Plan: Order for continuous received.  Patient received racemic epi previously for upper airway transmitted sounds.  Patient remains clear in lungs fields, no wheezing heard at rest. (04/26/21 0815)

## 2021-04-26 NOTE — ED PROVIDER NOTES
ED Provider  Scribed for Vikram Dunlap D.O. by Dayo Diggs. 4/26/2021  6:25 AM    Means of arrival:EMS  History obtained from:Patient  History limited by: None    CHIEF COMPLAINT  Chief Complaint   Patient presents with    Shortness of Breath       HPI  Kristin Balderrama is a 30 y.o. female with a history of asthma who presents to the ED for evaluation of shortness of breath onset about 4 hours prior to arrival. Patient states she does have an inhaler which she has been using. She used her inhaler 7x prior to EMS arrival, per nursing note. She continues to have shortness of breath despite using her inhaler. She reports associated tight chest pain. She reports cough, congestion, and sore throat. States she is getting over bronchitis. No reports of any recent fevers. She notes having history of a-fib which is intermittent. She also has history of angina and type 2 diabetes.     PPE Note: I personally donned PPE for all patient encounters during this visit, including being clean-shaven with a surgical mask and gloves.     Scribe remained outside the patient's room and did not have any contact with the patient for the duration of patient encounter.       REVIEW OF SYSTEMS  See HPI for further details. All other systems are negative.     PAST MEDICAL HISTORY   has a past medical history of Abdominal pain, Anginal syndrome, Apnea, sleep, Arrhythmia, Arthritis, ASTHMA, Atrial fibrillation (HCC), Back pain, Borderline personality disorder ([facility]), Breath shortness, Bronchitis, Cardiac arrhythmia, Chickenpox, Chronic UTI (9/18/2010), COPD (chronic obstructive pulmonary disease) ([facility]), Cough, Daytime sleepiness, Dental disorder (03/08/2021), Depression, Diabetes (HCC), Diarrhea, Disorder of thyroid, Fall, Fatigue, Frequent headaches, Gasping for breath, Gynecological disorder, Headache(784.0), Heart burn, Heart murmur, History of falling, Indigestion, Migraine, 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    SOCIAL HISTORY  Social History     Tobacco Use    Smoking status: Never Smoker    Smokeless tobacco: Never Used   Substance and Sexual Activity    Alcohol use: No     Alcohol/week: 0.0 oz    Drug use: Not Currently     Frequency: 7.0 times per week     Types: Marijuana    Sexual activity: Not Currently     Birth control/protection: Pill       SURGICAL HISTORY   has a past surgical history that includes neuro dest facet l/s w/ig sngl (4/21/2015); recovery (1/27/2016); [place] by laparoscopy (8/29/2010); lumbar fusion anterior (8/21/2012); other cardiac surgery (1/2016); tonsillectomy; bowel stimulator insertion (7/13/2016); gastroscopy with balloon dilatation (N/A, 1/4/2017); muscle biopsy (Right, 1/26/2017); other abdominal surgery; laminotomy; and bowel stimulator insertion (3/10/2021).    CURRENT MEDICATIONS  No current facility-administered medications on file prior to encounter.     Current Outpatient Medications on File Prior to Encounter   Medication Sig Dispense Refill    predniSONE (DELTASONE) 20 MG Tab 3 tabs daily for 6 days, 2 tabs daily for 4 days, 1 tab daily for 4 days, 1/2 tab daily for 4 days 32 tablet 0    doxycycline (VIBRAMYCIN) 100 MG Tab Take 1 tablet by mouth 2 times a day for 7 days. 14 tablet 0    diphenhydrAMINE (BENADRYL) 25 MG Tab       fluticasone (FLONASE) 50 MCG/ACT nasal spray fluticasone propionate 50 mcg/actuation nasal spray,suspension      fluticasone (FLONASE) 50 MCG/ACT nasal spray       gabapentin (NEURONTIN) 300 MG Cap       methocarbamol (ROBAXIN) 750 MG Tab Take 750 mg by mouth  every four hours as needed. EVERY 4 TO 6 HOURS AS NEEDED      ondansetron (ZOFRAN) 4 MG/2ML Solution injection       metoclopramide (REGLAN) 10 MG Tab Take 1 tablet by mouth 4 times a day as needed (nausea vomiting) for up to 5 days. 20 tablet 0    ipratropium-albuterol (DUONEB) 0.5-2.5 (3) MG/3ML nebulizer solution Take 3 mL by nebulization every four hours as needed for Shortness of Breath. Nebulizer 360 mL 2    ferrous sulfate 325 (65 Fe) MG tablet Take 1 tablet by mouth every morning with breakfast. 30 tablet 3    ibuprofen (MOTRIN) 400 MG Tab Take 1 tablet by mouth every 6 hours as needed. 30 tablet 1    fluticasone (FLOVENT HFA) 220 MCG/ACT Aerosol Inhale 1 Puff 2 times a day. 1 Each 0    benzonatate (TESSALON) 100 MG Cap Take 1 capsule by mouth 3 times a day as needed for Cough. 60 capsule 0    metoprolol SR (TOPROL XL) 25 MG TABLET SR 24 HR Take 0.5 Tablets by mouth every evening. 30 tablet 1    baclofen (LIORESAL) 10 MG Tab Take 1 tablet by mouth 3 times a day. 90 tablet 2    pregabalin (LYRICA) 300 MG capsule Take 1 capsule by mouth 2 times a day for 90 days. 60 capsule 2    ivabradine (CORLANOR) 7.5 MG Tab tablet Take 1 tablet by mouth 2 times a day, with meals. 180 tablet 3    busPIRone (BUSPAR) 30 MG tablet Take 1 tablet by mouth twice a day (Patient taking differently: Take 10 mg by mouth 3 times a day.) 180 tablet 0    ziprasidone (GEODON) 40 MG Cap Take 1 tablet by mouth twice a day (Patient taking differently: Take 40 mg by mouth 2 times a day.) 180 capsule 0    OXcarbazepine (TRILEPTAL) 300 MG Tab Take 1 tablet by mouth 2 times a day. 180 tablet 0    fluoxetine (PROZAC) 40 MG capsule Take 1 capsule by mouth every day. 90 capsule 0    traZODone (DESYREL) 100 MG Tab Take 1 tablet by mouth every bedtime. 90 tablet 0    aspirin 81 MG EC tablet Take 81 mg by mouth every morning.      Dulaglutide (TRULICITY) 1.5 MG/0.5ML Solution Pen-injector Inject 0.5 mL under the skin every 7 days. (Patient taking  "differently: Inject 0.5 mL under the skin every Friday.) 6 mL 3    potassium chloride SA (KDUR) 20 MEQ Tab CR Take 40 mEq by mouth 2 times a day. 2 tablets = 40 mEq.      mycophenolate (CELLCEPT) 500 MG tablet Take 2 Tablets by mouth 2 times a day. (Patient taking differently: Take 500 mg by mouth 2 times a day.) 120 tablet 0    levothyroxine (SYNTHROID) 100 MCG Tab Take 100 mcg by mouth Every morning on an empty stomach.      Melatonin 10 MG Tab Take 10 mg by mouth every bedtime.      albuterol 108 (90 Base) MCG/ACT Aero Soln inhalation aerosol Inhale 2 Puffs every 6 hours as needed for Shortness of Breath. 8.5 g 6    Mirabegron ER (MYRBETRIQ) 50 MG TABLET SR 24 HR Take 50 mg by mouth every day. (Patient taking differently: Take 50 mg by mouth every morning.) 90 Tab 3    sodium bicarbonate 325 MG Tab Take 650 mg by mouth 2 times a day. 2 tabs = 650 mg      topiramate (TOPAMAX) 50 MG tablet Take 50 mg by mouth 2 times a day.      vitamin D, Ergocalciferol, (DRISDOL) 1.25 MG (63852 UT) Cap capsule Take 50,000 Units by mouth every Friday. In the morning.          ALLERGIES  Allergies   Allergen Reactions    Depakote [Divalproex Sodium] Unspecified     Muscle spasms/muscle pain and weakness      Amitriptyline Unspecified     Headaches      Aripiprazole [Abilify] Unspecified     Headaches/muscle twitching      Clindamycin Nausea     Even with food      Flagyl [Metronidazole Hcl] Unspecified     \"eye problems\"      Flomax [Tamsulosin Hydrochloride] Swelling    Levaquin Unspecified     Severe muscle cramps in legs  RXN=unknown    Metformin Unspecified     Increased lactic acid       Tamsulosin Swelling     Swelling of legs    Tape Rash     Tears skin off  coban with Tegaderm tape ok intermittently  RXN=ongoing    Vancomycin Itching     Pt becomes flushed in face and chest.   RXN=7/10/16    Wound Dressing Adhesive Hives     By pt report    Ampicillin Rash     Pt reports that she received a rash     Ciprofloxacin Rash       " "   Keflex Rash     Pt states she gets a rash with this medication  Tolerates ceftriaxone  Can take with Benadryl    Levofloxacin Unspecified     Leg muscle cramps    Metronidazole Rash     \"Vision problems\"    Penicillins Hives     Can take with Benadryl    Sulfa Drugs Itching and Myalgia     Muscle pain and weakness    Valproic Acid Rash     .       PHYSICAL EXAM  VITAL SIGNS: /64   Pulse 83   Temp 36.1 °C (97 °F) (Temporal)   Resp 20   Ht 1.626 m (5' 4\")   Wt 102 kg (225 lb)   SpO2 98%   BMI 38.62 kg/m²   Constitutional: Alert in no apparent distress.  HENT: No signs of trauma, mucous membranes are moist  Eyes: Conjunctiva normal, Non-icteric.   Neck: Normal range of motion, No tenderness, Supple.  Lymphatic: No lymphadenopathy noted.   Cardiovascular: Regular rate and rhythm, no murmurs.   Thorax & Lungs: Tachypneic. Auditory wheezing but lungs clear to auscultation. No respiratory distress. No chest tenderness.   Abdomen: Bowel sounds normal, Soft, No tenderness, No masses, No pulsatile masses. No peritoneal signs.  Skin: Warm, Dry, normal color.   Back: No bony tenderness, No CVA tenderness.   Extremities: No edema, No tenderness, No cyanosis  Musculoskeletal: Good range of motion in all major joints. No tenderness to palpation or major deformities noted.   Neurologic: Alert and oriented x4, Normal motor function, Normal sensory function, No focal deficits noted.   Psychiatric: Affect normal, Judgment normal, Mood normal.         DIAGNOSTIC STUDIES / PROCEDURES    EKG  12 Lead EKG interpreted by me shown below.     LABS  Results for orders placed or performed during the hospital encounter of 04/26/21   CBC w/ Differential   Result Value Ref Range    WBC 5.2 4.8 - 10.8 K/uL    RBC 4.12 (L) 4.20 - 5.40 M/uL    Hemoglobin 12.1 12.0 - 16.0 g/dL    Hematocrit 37.7 37.0 - 47.0 %    MCV 91.5 81.4 - 97.8 fL    MCH 29.4 27.0 - 33.0 pg    MCHC 32.1 (L) 33.6 - 35.0 g/dL    RDW 45.4 35.9 - 50.0 fL    Platelet " Count 111 (L) 164 - 446 K/uL    MPV 11.8 9.0 - 12.9 fL    Neutrophils-Polys 62.60 44.00 - 72.00 %    Lymphocytes 29.50 22.00 - 41.00 %    Monocytes 5.90 0.00 - 13.40 %    Eosinophils 0.60 0.00 - 6.90 %    Basophils 0.40 0.00 - 1.80 %    Immature Granulocytes 1.00 (H) 0.00 - 0.90 %    Nucleated RBC 0.00 /100 WBC    Neutrophils (Absolute) 3.27 2.00 - 7.15 K/uL    Lymphs (Absolute) 1.54 1.00 - 4.80 K/uL    Monos (Absolute) 0.31 0.00 - 0.85 K/uL    Eos (Absolute) 0.03 0.00 - 0.51 K/uL    Baso (Absolute) 0.02 0.00 - 0.12 K/uL    Immature Granulocytes (abs) 0.05 0.00 - 0.11 K/uL    NRBC (Absolute) 0.00 K/uL   Complete Metabolic Panel (CMP)   Result Value Ref Range    Sodium 137 135 - 145 mmol/L    Potassium 3.2 (L) 3.6 - 5.5 mmol/L    Chloride 102 96 - 112 mmol/L    Co2 20 20 - 33 mmol/L    Anion Gap 15.0 7.0 - 16.0    Glucose 132 (H) 65 - 99 mg/dL    Bun 10 8 - 22 mg/dL    Creatinine 0.84 0.50 - 1.40 mg/dL    Calcium 9.3 8.5 - 10.5 mg/dL    AST(SGOT) 14 12 - 45 U/L    ALT(SGPT) 37 2 - 50 U/L    Alkaline Phosphatase 83 30 - 99 U/L    Total Bilirubin 0.3 0.1 - 1.5 mg/dL    Albumin 4.0 3.2 - 4.9 g/dL    Total Protein 6.2 6.0 - 8.2 g/dL    Globulin 2.2 1.9 - 3.5 g/dL    A-G Ratio 1.8 g/dL   Troponin STAT   Result Value Ref Range    Troponin T <6 6 - 19 ng/L   Lipase   Result Value Ref Range    Lipase 25 11 - 82 U/L   COV-2, FLU A/B, AND RSV BY PCR (2-4 HOURS CEPHEID): Collect NP swab in VTM    Specimen: Respirate   Result Value Ref Range    Influenza virus A RNA Negative Negative    Influenza virus B, PCR Negative Negative    RSV, PCR Negative Negative    SARS-CoV-2 by PCR NotDetected     SARS-CoV-2 Source NP Swab    ESTIMATED GFR   Result Value Ref Range    GFR If African American >60 >60 mL/min/1.73 m 2    GFR If Non African American >60 >60 mL/min/1.73 m 2   Group A Strep by PCR   Result Value Ref Range    Group A Strep by PCR Not Detected Not Detected   EKG   Result Value Ref Range    [facility]  [place] [facility]    Test Date:  2021  Pt Name:    HARLEY DE LA CRUZ              Department: ER  MRN:        [id number]                      Room:       RD 09  Gender:     Female                       Technician: [id number]  :        [id number]                   Requested By:LC GOMEZ  Order #:    [id number]                    Reading MD: LC GOMEZ D.O.    Measurements  Intervals                                Axis  Rate:       80                           P:          16  NH:         148                          QRS:        13  QRSD:       96                           T:          -65  QT:         428  QTc:        494    Interpretive Statements  SINUS RHYTHM  PROBABLE LEFT VENTRICULAR HYPERTROPHY    Compared to ECG 2021 09:25:49  T-wave abnormality now present  Sinus bradycardia no longer present  Electronically Signed On 2021 8:46:01 PDT by LC GOMEZ D.O.     POCT glucose device results   Result Value Ref Range    Glucose - Accu-Ck 282 (H) 65 - 99 mg/dL     *Note: Due to a large number of results and/or encounters for the requested time period, some results have not been displayed. A complete set of results can be found in Results Review.      All labs reviewed by me.    RADIOLOGY  DX-NECK FOR SOFT TISSUE   Final Result         1.  Unremarkable soft tissue examination of the neck.      DX-CHEST-PORTABLE (1 VIEW)   Final Result         1.  Left basilar atelectasis, no focal infiltrate      CT-CHEST (THORAX) WITH    (Results Pending)     The radiologist's interpretations of all radiological studies have been reviewed by me.    Films have been independently by me      COURSE  Pertinent Labs & Imaging studies reviewed. (See chart for details)    6:25 AM - Patient seen and examined at bedside. Discussed plan of care. The patient will be medicated with Micronefrin 2.25% nebulizer solution 0.5 ml, solumedrol 125 mg injection 125 mg. Ordered for DX neck for soft tissue, DX chest,  COV-2, flu a/b, and rsv by pcr, CBC with differential, CMP, troponin stat, lipase, estimated GFR, EKG to evaluate her symptoms.     7:44 AM Patient reevaluated at bedside. Audible wheezing has resolved. Patient remains tachypneic. Will give additional albuterol. Patient has requested motrin for generalized pain.     8:43 AM Paged [facility]    MEDICAL DECISION MAKING  This is a 30 y.o. female who presents with complaints of shortness of breath.  She has known history of asthma.  On arrival she had some auditory wheezing sound but not on auscultation.  She was given racemic epi and that sound did resolve.  It did not improve her shortness of breath.  She received 1 hour nebulized treatment with little improvement.  She has diminished breath sounds but no wheezing.  She is not hypoxic she is tachypneic with a mild accessory muscle use she is admitted to the hospital for further evaluation and treatment      DISPOSITION:  Patient will be hospitalized by [facility] in stable condition.     FINAL IMPRESSION  1. Acute respiratory distress    2. Moderate persistent asthma with acute exacerbation         Dayo RODRIGUEZ (Eva), am scribing for, and in the presence of, Vikram Dunlap D.O..    Electronically signed by: Dayo Diggs (Eva), 4/26/2021    Vikram RODRIGUEZ D.O. personally performed the services described in this documentation, as scribed by Dayo Diggs in my presence, and it is both accurate and complete. C    The note accurately reflects work and decisions made by me.  Vikram Dunlap D.O.  4/26/2021  1:49 PM

## 2021-04-26 NOTE — H&P
History & Physical Note    Date of Admission: 4/26/2021  Admission Status: Observation-Outpatient  Attending: Sreekanth Bojorquez M.D.   Senior Resident: Serena Hamm M.D.  Contact Number: [phone]    Chief Complaint: Shortness of Breath    History of Present Illness (HPI):   Kristin is a 30 y.o. female with PMH of asthma, morbidly obese, type 2 diabetes, history of A. fib/SVT s/p ablation, chronic urinary retention on mirabegron, bowel bladder incontinence s/p spinal cord stimulator, history of sacral neuromodulation 3/21, on CellCept for optic neuritis, hypothyroidism, TONYA on CPAP, benign intracranial hypertension, peripheral neuropathy secondary to diabetes, schizoaffective disorder, fibromyalgia, PTSD, and GERD who presented 4/26/2021 who was admitted to [facility] ICU from 4/15-4/19 for viral bronchitis, chronic ovarian cyst and asthma exacerbation.  She was then seen in the ED thrice for shortness of breath.  She has been using her albuterol inhaler more frequently and used 7 times yesterday.  She presented today with acute onset shortness of breath started this morning.  She mentioned that her shortness of breath got better after last discharge however started getting worse since 4/19.  She has chronic cough which never improved according to the patient.  She has chills but denied having any fever.  She also has abdominal pain and nausea however she was told that she has chronic ovarian cyst and needs to be operated on outpatient during last admission.  Her other chronic problems are stable.    She was tachycardic in the ED but in sinus rhythm.  Vitals otherwise unremarkable on presentation.    Review of Systems:   Review of Systems   Constitutional: Positive for chills and malaise/fatigue. Negative for diaphoresis and fever.   HENT: Negative.  Negative for ear pain and hearing loss.    Eyes: Negative.  Negative for blurred vision and pain.   Respiratory: Positive for cough, sputum production, shortness  of breath and wheezing. Negative for hemoptysis.    Cardiovascular: Positive for chest pain and palpitations. Negative for leg swelling.   Gastrointestinal: Positive for abdominal pain and nausea. Negative for blood in stool, constipation, diarrhea, heartburn and vomiting.   Genitourinary: Negative.  Negative for dysuria and flank pain.   Musculoskeletal: Positive for myalgias. Negative for falls, joint pain and neck pain.   Skin: Negative.  Negative for itching and rash.   Neurological: Negative.  Negative for dizziness, speech change and headaches.       Past Medical History:   Past Medical History was reviewed with patient.   has a past medical history of Abdominal pain, Anginal syndrome, Apnea, sleep, Arrhythmia, Arthritis, ASTHMA, Atrial fibrillation ([facility]), Back pain, Borderline personality disorder ([facility]), Breath shortness, Bronchitis, Cardiac arrhythmia, Chickenpox, Chronic UTI (9/18/2010), COPD (chronic obstructive pulmonary disease) ([facility]), Cough, Daytime sleepiness, Dental disorder (03/08/2021), Depression, Diabetes ([facility]), Diarrhea, Disorder of thyroid, Fall, Fatigue, Frequent headaches, Gasping for breath, Gynecological disorder, Headache(784.0), Heart burn, Heart murmur, History of falling, Indigestion, Migraine, Mitochondrial disease ([facility]), 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    Past Surgical History: Past Surgical History was reviewed with patient.   has a past surgical history that includes neuro dest facet l/s w/ig sngl (4/21/2015); recovery (1/27/2016); delmar by laparoscopy (8/29/2010); lumbar fusion anterior  (8/21/2012); other cardiac surgery (1/2016); tonsillectomy; bowel stimulator insertion (7/13/2016); gastroscopy with balloon dilatation (N/A, 1/4/2017); muscle biopsy (Right, 1/26/2017); other abdominal surgery; laminotomy; and bowel stimulator insertion (3/10/2021).    Medications: Medications have been reviewed with patient.  Prior to Admission Medications   Prescriptions Last Dose Informant Patient Reported? Taking?   Dulaglutide (TRULICITY) 1.5 MG/0.5ML Solution Pen-injector 4/23/2021 at Q7D Patient No No   Sig: Inject 0.5 mL under the skin every 7 days.   Patient taking differently: Inject 0.5 mL under the skin every Friday.   Melatonin 10 MG Tab 4/25/2021 at PM Patient Yes No   Sig: Take 10 mg by mouth every bedtime.   Mirabegron ER (MYRBETRIQ) 50 MG TABLET SR 24 HR 4/25/2021 at AM Patient No No   Sig: Take 50 mg by mouth every day.   Patient taking differently: Take 50 mg by mouth every morning.   OXcarbazepine (TRILEPTAL) 300 MG Tab 4/25/2021 at PM Patient No No   Sig: Take 1 tablet by mouth 2 times a day.   albuterol 108 (90 Base) MCG/ACT Aero Soln inhalation aerosol 4/26/2021 at AM Patient No No   Sig: Inhale 2 Puffs every 6 hours as needed for Shortness of Breath.   aspirin 81 MG EC tablet 4/25/2021 at AM Patient Yes No   Sig: Take 81 mg by mouth every morning.   baclofen (LIORESAL) 10 MG Tab 4/25/2021 at PM Patient No No   Sig: Take 1 tablet by mouth 3 times a day.   benzonatate (TESSALON) 100 MG Cap Not Taking at Unknown time Patient No No   Sig: Take 1 capsule by mouth 3 times a day as needed for Cough.   Patient not taking: Reported on 4/26/2021   busPIRone (BUSPAR) 10 MG Tab tablet 4/25/2021 at PM Patient Yes Yes   Sig: Take 10 mg by mouth 3 times a day.   busPIRone (BUSPAR) 30 MG tablet Not Taking at Unknown time Patient No No   Sig: Take 1 tablet by mouth twice a day   Patient not taking: Reported on 4/26/2021   diphenhydrAMINE (BENADRYL) 25 MG Tab 4/25/2021 at PM Patient Yes No   Sig: Take 25 mg  by mouth at bedtime as needed for Sleep.   doxycycline (VIBRAMYCIN) 100 MG Tab STOPPED at UNK Patient No No   Sig: Take 1 tablet by mouth 2 times a day for 7 days.   Patient not taking: Reported on 4/26/2021   ferrous sulfate 325 (65 Fe) MG tablet 4/25/2021 at AM Patient No No   Sig: Take 1 tablet by mouth every morning with breakfast.   fluoxetine (PROZAC) 40 MG capsule 4/25/2021 at AM Patient No No   Sig: Take 1 capsule by mouth every day.   fluticasone (FLOVENT HFA) 220 MCG/ACT Aerosol 4/25/2021 at PM Patient No No   Sig: Inhale 1 Puff 2 times a day.   gabapentin (NEURONTIN) 300 MG Cap 4/25/2021 at PM Patient Yes No   Sig: Take 300 mg by mouth 3 times a day.   ibuprofen (MOTRIN) 400 MG Tab 4/25/2021 at PM Patient Yes No   Sig: Take 400 mg by mouth every 6 hours as needed for Mild Pain.   ipratropium-albuterol (DUONEB) 0.5-2.5 (3) MG/3ML nebulizer solution 4/25/2021 at PM Patient No No   Sig: Take 3 mL by nebulization every four hours as needed for Shortness of Breath. Nebulizer   ivabradine (CORLANOR) 7.5 MG Tab tablet 4/25/2021 at PM Patient No No   Sig: Take 1 tablet by mouth 2 times a day, with meals.   levothyroxine (SYNTHROID) 100 MCG Tab 4/25/2021 at AM Patient Yes No   Sig: Take 100 mcg by mouth Every morning on an empty stomach.   methocarbamol (ROBAXIN) 750 MG Tab 4/25/2021 at PM Patient Yes No   Sig: Take 750 mg by mouth every four hours as needed. Indications: Musculoskeletal Pain   metoclopramide (REGLAN) 10 MG Tab 4/25/2021 at PM Patient No No   Sig: Take 1 tablet by mouth 4 times a day as needed (nausea vomiting) for up to 5 days.   metoprolol SR (TOPROL XL) 25 MG TABLET SR 24 HR 4/25/2021 at PM Patient No No   Sig: Take 0.5 Tablets by mouth every evening.   Patient taking differently: Take 12.5 mg by mouth every evening. 0.5 tablet = 12.5 mg   mycophenolate (CELLCEPT) 500 MG tablet 4/25/2021 at PM Patient No No   Sig: Take 2 Tablets by mouth 2 times a day.   Patient taking differently: Take 500 mg  "by mouth 2 times a day. 500mg twice daily   potassium chloride SA (KDUR) 20 MEQ Tab CR 4/25/2021 at PM Patient Yes No   Sig: Take 40 mEq by mouth 2 times a day. 2 tablets = 40 mEq.   predniSONE (DELTASONE) 20 MG Tab 4/25/2021 at AM Patient No No   Sig: 3 tabs daily for 6 days, 2 tabs daily for 4 days, 1 tab daily for 4 days, 1/2 tab daily for 4 days   pregabalin (LYRICA) 300 MG capsule 4/25/2021 at PM Patient No No   Sig: Take 1 capsule by mouth 2 times a day for 90 days.   sodium bicarbonate 325 MG Tab 4/25/2021 at PM Patient Yes No   Sig: Take 650 mg by mouth 2 times a day. 2 tabs = 650 mg   topiramate (TOPAMAX) 50 MG tablet 4/25/2021 at PM Patient Yes No   Sig: Take 50 mg by mouth 2 times a day.   traZODone (DESYREL) 100 MG Tab 4/25/2021 at PM Patient No No   Sig: Take 1 tablet by mouth every bedtime.   vitamin D, Ergocalciferol, (DRISDOL) 1.25 MG (10071 UT) Cap capsule 4/23/2021 at Q7D Patient Yes No   Sig: Take 50,000 Units by mouth every Friday. In the morning.   ziprasidone (GEODON) 40 MG Cap 4/25/2021 at PM Patient No No   Sig: Take 1 tablet by mouth twice a day   Patient taking differently: Take 40 mg by mouth 2 times a day.      Facility-Administered Medications: None        Allergies: Allergies have been reviewed with patient.  Allergies   Allergen Reactions   • Depakote [Divalproex Sodium] Unspecified     Muscle spasms/muscle pain and weakness     • Amitriptyline Unspecified     Headaches     • Aripiprazole [Abilify] Unspecified     Headaches/muscle twitching     • Clindamycin Nausea     Even with food     • Flagyl [Metronidazole Hcl] Unspecified     \"eye problems\"     • Flomax [Tamsulosin Hydrochloride] Swelling   • Levaquin Unspecified     Severe muscle cramps in legs  RXN=unknown   • Metformin Unspecified     Increased lactic acid      • Tamsulosin Swelling     Swelling of legs   • Tape Rash     Tears skin off  coban with Tegaderm tape ok intermittently  RXN=ongoing   • Vancomycin Itching     Pt " "becomes flushed in face and chest.   RXN=7/10/16   • Wound Dressing Adhesive Hives     By pt report   • Ampicillin Rash     Pt reports that she received a rash    • Ciprofloxacin Rash         • Keflex Rash     Pt states she gets a rash with this medication  Tolerates ceftriaxone  Can take with Benadryl   • Levofloxacin Unspecified     Leg muscle cramps   • Metronidazole Rash     \"Vision problems\"   • Penicillins Hives     Can take with Benadryl   • Sulfa Drugs Itching and Myalgia     Muscle pain and weakness   • Valproic Acid Rash     .       Family History: As below  family history includes Genitourinary () Problems in her sister; Heart Disease in her maternal grandmother and mother; Hypertension in her maternal grandmother, maternal uncle, and mother; No Known Problems in her sister; Other in her mother and sister; Sleep Apnea in her mother.     Social History:   Tobacco: None  Alcohol: None   Recreational drugs (illegal and prescription):  Never   Employment: Do not work  Activity Level: Uses walker to walk   Living situation:  Lives with grandmother  Recent travel:  None  Primary Care Provider: reviewed Torres Brody M.D.  Other (stressors, spirituality, exposures):  None  Physical Exam:   Vitals:  Temp:  [36.1 °C (97 °F)-36.4 °C (97.5 °F)] 36.4 °C (97.5 °F)  Pulse:  [] 104  Resp:  [18-32] 18  BP: (110-146)/(55-74) 127/63  SpO2:  [93 %-99 %] 97 %    Physical Exam  Vitals and nursing note reviewed.   Constitutional:       Appearance: Normal appearance.   HENT:      Head: Normocephalic and atraumatic.      Nose: Nose normal. No congestion.      Mouth/Throat:      Mouth: Mucous membranes are moist.      Pharynx: Oropharynx is clear.   Eyes:      Extraocular Movements: Extraocular movements intact.      Conjunctiva/sclera: Conjunctivae normal.      Pupils: Pupils are equal, round, and reactive to light.   Cardiovascular:      Rate and Rhythm: Regular rhythm. Tachycardia present.      Pulses: Normal pulses. "      Heart sounds: Normal heart sounds. No murmur.   Pulmonary:      Effort: Pulmonary effort is normal. No respiratory distress.      Breath sounds: Wheezing (BIlaterally lower lobe) present.   Abdominal:      General: Abdomen is flat. Bowel sounds are normal.      Palpations: Abdomen is soft.      Tenderness: There is abdominal tenderness (Right lower lobe and umbillical region).   Musculoskeletal:         General: No swelling or tenderness. Normal range of motion.      Cervical back: Normal range of motion and neck supple.   Skin:     General: Skin is warm and dry.      Coloration: Skin is not jaundiced or pale.   Neurological:      Mental Status: She is alert and oriented to person, place, and time. Mental status is at baseline.         Labs:   Recent Results (from the past 24 hour(s))   CBC w/ Differential    Collection Time: 04/26/21  6:21 AM   Result Value Ref Range    WBC 5.2 4.8 - 10.8 K/uL    RBC 4.12 (L) 4.20 - 5.40 M/uL    Hemoglobin 12.1 12.0 - 16.0 g/dL    Hematocrit 37.7 37.0 - 47.0 %    MCV 91.5 81.4 - 97.8 fL    MCH 29.4 27.0 - 33.0 pg    MCHC 32.1 (L) 33.6 - 35.0 g/dL    RDW 45.4 35.9 - 50.0 fL    Platelet Count 111 (L) 164 - 446 K/uL    MPV 11.8 9.0 - 12.9 fL    Neutrophils-Polys 62.60 44.00 - 72.00 %    Lymphocytes 29.50 22.00 - 41.00 %    Monocytes 5.90 0.00 - 13.40 %    Eosinophils 0.60 0.00 - 6.90 %    Basophils 0.40 0.00 - 1.80 %    Immature Granulocytes 1.00 (H) 0.00 - 0.90 %    Nucleated RBC 0.00 /100 WBC    Neutrophils (Absolute) 3.27 2.00 - 7.15 K/uL    Lymphs (Absolute) 1.54 1.00 - 4.80 K/uL    Monos (Absolute) 0.31 0.00 - 0.85 K/uL    Eos (Absolute) 0.03 0.00 - 0.51 K/uL    Baso (Absolute) 0.02 0.00 - 0.12 K/uL    Immature Granulocytes (abs) 0.05 0.00 - 0.11 K/uL    NRBC (Absolute) 0.00 K/uL   Complete Metabolic Panel (CMP)    Collection Time: 04/26/21  6:21 AM   Result Value Ref Range    Sodium 137 135 - 145 mmol/L    Potassium 3.2 (L) 3.6 - 5.5 mmol/L    Chloride 102 96 - 112 mmol/L     Co2 20 20 - 33 mmol/L    Anion Gap 15.0 7.0 - 16.0    Glucose 132 (H) 65 - 99 mg/dL    Bun 10 8 - 22 mg/dL    Creatinine 0.84 0.50 - 1.40 mg/dL    Calcium 9.3 8.5 - 10.5 mg/dL    AST(SGOT) 14 12 - 45 U/L    ALT(SGPT) 37 2 - 50 U/L    Alkaline Phosphatase 83 30 - 99 U/L    Total Bilirubin 0.3 0.1 - 1.5 mg/dL    Albumin 4.0 3.2 - 4.9 g/dL    Total Protein 6.2 6.0 - 8.2 g/dL    Globulin 2.2 1.9 - 3.5 g/dL    A-G Ratio 1.8 g/dL   Troponin STAT    Collection Time: 21  6:21 AM   Result Value Ref Range    Troponin T <6 6 - 19 ng/L   Lipase    Collection Time: 21  6:21 AM   Result Value Ref Range    Lipase 25 11 - 82 U/L   ESTIMATED GFR    Collection Time: 21  6:21 AM   Result Value Ref Range    GFR If African American >60 >60 mL/min/1.73 m 2    GFR If Non African American >60 >60 mL/min/1.73 m 2   EKG    Collection Time: 21  6:34 AM   Result Value Ref Range    Report       [facility] Emergency Dept.    Test Date:  2021  Pt Name:    HARLEY DE LA CRUZ              Department: ER  MRN:        [id number]                      Room:       [facility]  Gender:     Female                       Technician: [id number]  :        [id number]                   Requested By:LC GOMEZ  Order #:    [id number]                    Reading MD: LC GOMEZ D.O.    Measurements  Intervals                                Axis  Rate:       80                           P:          16  NJ:         148                          QRS:        13  QRSD:       96                           T:          -65  QT:         428  QTc:        494    Interpretive Statements  SINUS RHYTHM  PROBABLE LEFT VENTRICULAR HYPERTROPHY    Compared to ECG 2021 09:25:49  T-wave abnormality now present  Sinus bradycardia no longer present  Electronically Signed On 2021 8:46:01 PDT by LC GOMEZ D.O.     COV-2, FLU A/B, AND RSV BY PCR (2-4 HOURS CEPHEID): Collect NP swab in VTM    Collection Time: 21   7:04 AM    Specimen: Respirate   Result Value Ref Range    Influenza virus A RNA Negative Negative    Influenza virus B, PCR Negative Negative    RSV, PCR Negative Negative    SARS-CoV-2 by PCR NotDetected     SARS-CoV-2 Source NP Swab    Group A Strep by PCR    Collection Time: 04/26/21  7:04 AM   Result Value Ref Range    Group A Strep by PCR Not Detected Not Detected   POCT glucose device results    Collection Time: 04/26/21 12:31 PM   Result Value Ref Range    Glucose - Accu-Ck 282 (H) 65 - 99 mg/dL       EKG: Per my read, QTc prolongation otherwise unremarkable.     Imaging:   DX-NECK FOR SOFT TISSUE   Final Result         1.  Unremarkable soft tissue examination of the neck.      DX-CHEST-PORTABLE (1 VIEW)   Final Result         1.  Left basilar atelectasis, no focal infiltrate      CT-CHEST (THORAX) WITH    (Results Pending)       Previous Data Review: reviewed    Problem Representation:     * Asthma exacerbation- (present on admission)  Assessment & Plan  She has a history of asthma and is on bronchodilator therapy at home.  Her last PFT done in 2019 showed FEV1 at 79% of predicted and FVC at 76% of predicted with an FEV1/FVC ratio of 88.  DLCO 85%.  This is consistent with mild obstructive disease.  - This episode could have been exacerbated with dog exposure which she is allergic to and She also ran out of duoneb inhalers.   -We will start her on methylprednisolone 40 mg IV twice daily along with bronchodilator therapy.  Her eosinophil counts have always been normal.  - Follow-up with CT chest with contrast  - She has tried taking montelukast and end up having cardiac effusion.   - Follow up with PCP on discharge to avoid readmission.     Diabetes mellitus type 2 in obese (HCC)- (present on admission)  Assessment & Plan  She is on trulicity at home. Will continue insulin SSI here. Last dose of trulicity last Friday.     Peripheral neuropathy and chornic pain syndrome (CMS-HCC)- (present on  admission)  Assessment & Plan  She is on multiple medications. Explained her that she is on too many medications and need to cut down on few medications such as she is on lyrica and gabapentin both for neuropathy. Increased gabapentin and stopped lyrica.  - She is on baclofen and robaxin for muscle spasms. Stopped robaxin. Can increase baclofen is asked.   - She is on melatonin, benadryl and trazodone for sleep. Stopped benadryl and trazodone and continued her on melatonin.   - Polypharmacy is an issue here.     Angina of effort (HCC)  Assessment & Plan  She has a history of angina  And is taking aspirin and metoprolol at home. She was bradycardiac for a moment with HR of 58 in the ED. She is also on ivabradin. Continue these medications.     Mixed stress and urge urinary incontinence- (present on admission)  Assessment & Plan  Continue mirabegron       Optic neuritis- (present on admission)  Assessment & Plan  Continue cellcept home dose.     Hypothyroidism- (present on admission)  Assessment & Plan  Last TSh 1.24 4 months ago.  - Continue 100 mcg levothyroxine.

## 2021-04-26 NOTE — ASSESSMENT & PLAN NOTE
She has a history of angina  And is taking aspirin and metoprolol at home. She was bradycardiac for a moment with HR of 58 in the ED. She is also on ivabradin.    Plan:  -continue home baby aspirin  -continue home metoprolol  -continue home ivabradin

## 2021-04-26 NOTE — ED NOTES
Med Rec complete per pt  Allergies Reviewed    Pt stopped taking DOXYCYCLINE, last dose unknown    Pt started a taper course of PREDNISONE on 4/23
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
## 2021-04-26 NOTE — ASSESSMENT & PLAN NOTE
She is on trulicity at home.  Last dose of trulicity last Friday.     Plan;  insulin SSI and hypoglycemia management.
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
## 2021-04-27 PROBLEM — J45.901 ASTHMA EXACERBATION: Chronic | Status: ACTIVE | Noted: 2021-04-15

## 2021-04-27 LAB
ALBUMIN SERPL BCP-MCNC: 3.9 G/DL (ref 3.2–4.9)
ALBUMIN/GLOB SERPL: 1.9 G/DL
ALP SERPL-CCNC: 79 U/L (ref 30–99)
ALT SERPL-CCNC: 25 U/L (ref 2–50)
ANION GAP SERPL CALC-SCNC: 10 MMOL/L (ref 7–16)
ANION GAP SERPL CALC-SCNC: 9 MMOL/L (ref 7–16)
AST SERPL-CCNC: 6 U/L (ref 12–45)
BASOPHILS # BLD AUTO: 0.2 % (ref 0–1.8)
BASOPHILS # BLD: 0.02 K/UL (ref 0–0.12)
BILIRUB SERPL-MCNC: 0.3 MG/DL (ref 0.1–1.5)
BUN SERPL-MCNC: 10 MG/DL (ref 8–22)
BUN SERPL-MCNC: 9 MG/DL (ref 8–22)
CALCIUM SERPL-MCNC: 9.3 MG/DL (ref 8.5–10.5)
CALCIUM SERPL-MCNC: 9.3 MG/DL (ref 8.5–10.5)
CHLORIDE SERPL-SCNC: 105 MMOL/L (ref 96–112)
CHLORIDE SERPL-SCNC: 106 MMOL/L (ref 96–112)
CO2 SERPL-SCNC: 18 MMOL/L (ref 20–33)
CO2 SERPL-SCNC: 19 MMOL/L (ref 20–33)
CREAT SERPL-MCNC: 0.51 MG/DL (ref 0.5–1.4)
CREAT SERPL-MCNC: 0.58 MG/DL (ref 0.5–1.4)
EOSINOPHIL # BLD AUTO: 0 K/UL (ref 0–0.51)
EOSINOPHIL NFR BLD: 0 % (ref 0–6.9)
ERYTHROCYTE [DISTWIDTH] IN BLOOD BY AUTOMATED COUNT: 47.4 FL (ref 35.9–50)
GLOBULIN SER CALC-MCNC: 2.1 G/DL (ref 1.9–3.5)
GLUCOSE BLD-MCNC: 118 MG/DL (ref 65–99)
GLUCOSE BLD-MCNC: 121 MG/DL (ref 65–99)
GLUCOSE BLD-MCNC: 132 MG/DL (ref 65–99)
GLUCOSE BLD-MCNC: 156 MG/DL (ref 65–99)
GLUCOSE SERPL-MCNC: 149 MG/DL (ref 65–99)
GLUCOSE SERPL-MCNC: 167 MG/DL (ref 65–99)
HCT VFR BLD AUTO: 35.2 % (ref 37–47)
HGB BLD-MCNC: 11.4 G/DL (ref 12–16)
IMM GRANULOCYTES # BLD AUTO: 0.06 K/UL (ref 0–0.11)
IMM GRANULOCYTES NFR BLD AUTO: 0.5 % (ref 0–0.9)
INR PPP: 0.98 (ref 0.87–1.13)
LYMPHOCYTES # BLD AUTO: 0.95 K/UL (ref 1–4.8)
LYMPHOCYTES NFR BLD: 8.3 % (ref 22–41)
MAGNESIUM SERPL-MCNC: 1.9 MG/DL (ref 1.5–2.5)
MAGNESIUM SERPL-MCNC: 2.1 MG/DL (ref 1.5–2.5)
MCH RBC QN AUTO: 29.4 PG (ref 27–33)
MCHC RBC AUTO-ENTMCNC: 32.4 G/DL (ref 33.6–35)
MCV RBC AUTO: 90.7 FL (ref 81.4–97.8)
MONOCYTES # BLD AUTO: 0.38 K/UL (ref 0–0.85)
MONOCYTES NFR BLD AUTO: 3.3 % (ref 0–13.4)
NEUTROPHILS # BLD AUTO: 10.05 K/UL (ref 2–7.15)
NEUTROPHILS NFR BLD: 87.7 % (ref 44–72)
NRBC # BLD AUTO: 0 K/UL
NRBC BLD-RTO: 0 /100 WBC
PHOSPHATE SERPL-MCNC: 3.5 MG/DL (ref 2.5–4.5)
PLATELET # BLD AUTO: 182 K/UL (ref 164–446)
PMV BLD AUTO: 11.7 FL (ref 9–12.9)
POTASSIUM SERPL-SCNC: 4.3 MMOL/L (ref 3.6–5.5)
POTASSIUM SERPL-SCNC: 4.6 MMOL/L (ref 3.6–5.5)
PROT SERPL-MCNC: 6 G/DL (ref 6–8.2)
PROTHROMBIN TIME: 13.3 SEC (ref 12–14.6)
RBC # BLD AUTO: 3.88 M/UL (ref 4.2–5.4)
SODIUM SERPL-SCNC: 132 MMOL/L (ref 135–145)
SODIUM SERPL-SCNC: 135 MMOL/L (ref 135–145)
WBC # BLD AUTO: 11.5 K/UL (ref 4.8–10.8)

## 2021-04-27 PROCEDURE — 96372 THER/PROPH/DIAG INJ SC/IM: CPT

## 2021-04-27 PROCEDURE — 94760 N-INVAS EAR/PLS OXIMETRY 1: CPT

## 2021-04-27 PROCEDURE — 94660 CPAP INITIATION&MGMT: CPT

## 2021-04-27 PROCEDURE — 85025 COMPLETE CBC W/AUTO DIFF WBC: CPT

## 2021-04-27 PROCEDURE — 96376 TX/PRO/DX INJ SAME DRUG ADON: CPT

## 2021-04-27 PROCEDURE — 96375 TX/PRO/DX INJ NEW DRUG ADDON: CPT

## 2021-04-27 PROCEDURE — 700102 HCHG RX REV CODE 250 W/ 637 OVERRIDE(OP): Performed by: STUDENT IN AN ORGANIZED HEALTH CARE EDUCATION/TRAINING PROGRAM

## 2021-04-27 PROCEDURE — 85610 PROTHROMBIN TIME: CPT

## 2021-04-27 PROCEDURE — 51798 US URINE CAPACITY MEASURE: CPT

## 2021-04-27 PROCEDURE — 82962 GLUCOSE BLOOD TEST: CPT

## 2021-04-27 PROCEDURE — 700111 HCHG RX REV CODE 636 W/ 250 OVERRIDE (IP): Performed by: STUDENT IN AN ORGANIZED HEALTH CARE EDUCATION/TRAINING PROGRAM

## 2021-04-27 PROCEDURE — 80053 COMPREHEN METABOLIC PANEL: CPT

## 2021-04-27 PROCEDURE — 770020 HCHG ROOM/CARE - TELE (206)

## 2021-04-27 PROCEDURE — 700101 HCHG RX REV CODE 250: Performed by: STUDENT IN AN ORGANIZED HEALTH CARE EDUCATION/TRAINING PROGRAM

## 2021-04-27 PROCEDURE — 99232 SBSQ HOSP IP/OBS MODERATE 35: CPT | Mod: GC | Performed by: INTERNAL MEDICINE

## 2021-04-27 PROCEDURE — A9270 NON-COVERED ITEM OR SERVICE: HCPCS | Performed by: STUDENT IN AN ORGANIZED HEALTH CARE EDUCATION/TRAINING PROGRAM

## 2021-04-27 PROCEDURE — 83735 ASSAY OF MAGNESIUM: CPT

## 2021-04-27 PROCEDURE — 94640 AIRWAY INHALATION TREATMENT: CPT

## 2021-04-27 PROCEDURE — 84100 ASSAY OF PHOSPHORUS: CPT

## 2021-04-27 PROCEDURE — 36415 COLL VENOUS BLD VENIPUNCTURE: CPT

## 2021-04-27 PROCEDURE — 700101 HCHG RX REV CODE 250: Performed by: HOSPITALIST

## 2021-04-27 RX ORDER — CHOLECALCIFEROL (VITAMIN D3) 125 MCG
10 CAPSULE ORAL
Status: DISCONTINUED | OUTPATIENT
Start: 2021-04-27 | End: 2021-04-28 | Stop reason: HOSPADM

## 2021-04-27 RX ORDER — ALBUTEROL SULFATE 90 UG/1
2 AEROSOL, METERED RESPIRATORY (INHALATION)
Status: DISCONTINUED | OUTPATIENT
Start: 2021-04-27 | End: 2021-04-28 | Stop reason: HOSPADM

## 2021-04-27 RX ORDER — DEXTROSE MONOHYDRATE 25 G/50ML
50 INJECTION, SOLUTION INTRAVENOUS
Status: DISCONTINUED | OUTPATIENT
Start: 2021-04-27 | End: 2021-04-28 | Stop reason: HOSPADM

## 2021-04-27 RX ORDER — OMEPRAZOLE 20 MG/1
40 CAPSULE, DELAYED RELEASE ORAL DAILY
Status: DISCONTINUED | OUTPATIENT
Start: 2021-04-27 | End: 2021-04-28 | Stop reason: HOSPADM

## 2021-04-27 RX ORDER — FLUTICASONE PROPIONATE 110 UG/1
2 AEROSOL, METERED RESPIRATORY (INHALATION)
Status: DISCONTINUED | OUTPATIENT
Start: 2021-04-27 | End: 2021-04-28 | Stop reason: HOSPADM

## 2021-04-27 RX ORDER — METOCLOPRAMIDE 10 MG/1
10 TABLET ORAL EVERY 6 HOURS PRN
Status: DISCONTINUED | OUTPATIENT
Start: 2021-04-27 | End: 2021-04-27

## 2021-04-27 RX ORDER — POLYETHYLENE GLYCOL 3350 17 G/17G
1 POWDER, FOR SOLUTION ORAL
Status: DISCONTINUED | OUTPATIENT
Start: 2021-04-27 | End: 2021-04-28 | Stop reason: HOSPADM

## 2021-04-27 RX ORDER — PREDNISONE 2.5 MG/1
2.5 TABLET ORAL
Qty: 30 TABLET | Refills: 0 | Status: CANCELLED | OUTPATIENT
Start: 2021-04-27

## 2021-04-27 RX ORDER — AMOXICILLIN 250 MG
2 CAPSULE ORAL 2 TIMES DAILY PRN
Status: DISCONTINUED | OUTPATIENT
Start: 2021-04-27 | End: 2021-04-28 | Stop reason: HOSPADM

## 2021-04-27 RX ORDER — PREDNISONE 20 MG/1
60 TABLET ORAL DAILY
Qty: 18 TABLET | Refills: 0 | Status: CANCELLED | OUTPATIENT
Start: 2021-04-27 | End: 2021-05-03

## 2021-04-27 RX ORDER — POLYETHYLENE GLYCOL 3350 17 G/17G
1 POWDER, FOR SOLUTION ORAL ONCE
Status: DISPENSED | OUTPATIENT
Start: 2021-04-27 | End: 2021-04-28

## 2021-04-27 RX ORDER — AMOXICILLIN 250 MG
1 CAPSULE ORAL ONCE
Status: COMPLETED | OUTPATIENT
Start: 2021-04-27 | End: 2021-04-27

## 2021-04-27 RX ORDER — BISACODYL 10 MG
10 SUPPOSITORY, RECTAL RECTAL
Status: DISCONTINUED | OUTPATIENT
Start: 2021-04-27 | End: 2021-04-28 | Stop reason: HOSPADM

## 2021-04-27 RX ADMIN — DOCUSATE SODIUM 50 MG AND SENNOSIDES 8.6 MG 1 TABLET: 8.6; 5 TABLET, FILM COATED ORAL at 08:49

## 2021-04-27 RX ADMIN — ACETAMINOPHEN 1000 MG: 500 TABLET ORAL at 13:38

## 2021-04-27 RX ADMIN — OXCARBAZEPINE 300 MG: 300 TABLET, FILM COATED ORAL at 04:43

## 2021-04-27 RX ADMIN — TOPIRAMATE 50 MG: 25 TABLET, FILM COATED ORAL at 17:44

## 2021-04-27 RX ADMIN — BUSPIRONE HYDROCHLORIDE 10 MG: 10 TABLET ORAL at 04:44

## 2021-04-27 RX ADMIN — POTASSIUM CHLORIDE 40 MEQ: 1500 TABLET, EXTENDED RELEASE ORAL at 08:49

## 2021-04-27 RX ADMIN — MYCOPHENOLATE MOFETIL 500 MG: 250 CAPSULE ORAL at 17:45

## 2021-04-27 RX ADMIN — BACLOFEN 10 MG: 10 TABLET ORAL at 12:02

## 2021-04-27 RX ADMIN — TOPIRAMATE 50 MG: 25 TABLET, FILM COATED ORAL at 04:40

## 2021-04-27 RX ADMIN — METHYLPREDNISOLONE SODIUM SUCCINATE 40 MG: 40 INJECTION, POWDER, FOR SOLUTION INTRAMUSCULAR; INTRAVENOUS at 17:43

## 2021-04-27 RX ADMIN — ENOXAPARIN SODIUM 30 MG: 30 INJECTION SUBCUTANEOUS at 17:41

## 2021-04-27 RX ADMIN — ENOXAPARIN SODIUM 30 MG: 30 INJECTION SUBCUTANEOUS at 04:45

## 2021-04-27 RX ADMIN — OMEPRAZOLE 40 MG: 20 CAPSULE, DELAYED RELEASE ORAL at 12:02

## 2021-04-27 RX ADMIN — IVABRADINE 7.5 MG: 7.5 TABLET, FILM COATED ORAL at 08:49

## 2021-04-27 RX ADMIN — ASPIRIN 81 MG: 81 TABLET, COATED ORAL at 04:43

## 2021-04-27 RX ADMIN — ONDANSETRON 4 MG: 2 INJECTION INTRAMUSCULAR; INTRAVENOUS at 12:02

## 2021-04-27 RX ADMIN — GABAPENTIN 400 MG: 400 CAPSULE ORAL at 17:43

## 2021-04-27 RX ADMIN — ZIPRASIDONE HYDROCHLORIDE 40 MG: 40 CAPSULE ORAL at 18:49

## 2021-04-27 RX ADMIN — ONDANSETRON 4 MG: 2 INJECTION INTRAMUSCULAR; INTRAVENOUS at 17:43

## 2021-04-27 RX ADMIN — BACLOFEN 10 MG: 10 TABLET ORAL at 17:43

## 2021-04-27 RX ADMIN — SODIUM BICARBONATE 650 MG: 650 TABLET ORAL at 17:44

## 2021-04-27 RX ADMIN — ALBUTEROL SULFATE 2.5 MG: 2.5 SOLUTION RESPIRATORY (INHALATION) at 06:23

## 2021-04-27 RX ADMIN — BUSPIRONE HYDROCHLORIDE 10 MG: 10 TABLET ORAL at 17:44

## 2021-04-27 RX ADMIN — FERROUS SULFATE TAB 325 MG (65 MG ELEMENTAL FE) 325 MG: 325 (65 FE) TAB at 08:49

## 2021-04-27 RX ADMIN — GABAPENTIN 400 MG: 400 CAPSULE ORAL at 04:44

## 2021-04-27 RX ADMIN — SODIUM BICARBONATE 650 MG: 650 TABLET ORAL at 04:40

## 2021-04-27 RX ADMIN — MYCOPHENOLATE MOFETIL 500 MG: 250 CAPSULE ORAL at 04:42

## 2021-04-27 RX ADMIN — GABAPENTIN 400 MG: 400 CAPSULE ORAL at 12:02

## 2021-04-27 RX ADMIN — ALBUTEROL SULFATE 2.5 MG: 2.5 SOLUTION RESPIRATORY (INHALATION) at 20:04

## 2021-04-27 RX ADMIN — ONDANSETRON 4 MG: 2 INJECTION INTRAMUSCULAR; INTRAVENOUS at 04:59

## 2021-04-27 RX ADMIN — FLUTICASONE PROPIONATE 220 MCG: 110 AEROSOL, METERED RESPIRATORY (INHALATION) at 20:03

## 2021-04-27 RX ADMIN — POTASSIUM CHLORIDE 40 MEQ: 1500 TABLET, EXTENDED RELEASE ORAL at 17:43

## 2021-04-27 RX ADMIN — BACLOFEN 10 MG: 10 TABLET ORAL at 04:40

## 2021-04-27 RX ADMIN — METOPROLOL SUCCINATE 12.5 MG: 25 TABLET, EXTENDED RELEASE ORAL at 17:43

## 2021-04-27 RX ADMIN — ACETAMINOPHEN 1000 MG: 500 TABLET ORAL at 21:25

## 2021-04-27 RX ADMIN — ALBUTEROL SULFATE 2.5 MG: 2.5 SOLUTION RESPIRATORY (INHALATION) at 01:35

## 2021-04-27 RX ADMIN — ZIPRASIDONE HYDROCHLORIDE 40 MG: 40 CAPSULE ORAL at 04:39

## 2021-04-27 RX ADMIN — BUSPIRONE HYDROCHLORIDE 10 MG: 10 TABLET ORAL at 12:02

## 2021-04-27 RX ADMIN — LEVOTHYROXINE SODIUM 100 MCG: 0.1 TABLET ORAL at 04:41

## 2021-04-27 RX ADMIN — DOCUSATE SODIUM 50 MG AND SENNOSIDES 8.6 MG 2 TABLET: 8.6; 5 TABLET, FILM COATED ORAL at 04:50

## 2021-04-27 RX ADMIN — FLUOXETINE 40 MG: 20 CAPSULE ORAL at 04:41

## 2021-04-27 RX ADMIN — INSULIN LISPRO 1 UNITS: 100 INJECTION, SOLUTION INTRAVENOUS; SUBCUTANEOUS at 21:22

## 2021-04-27 RX ADMIN — OXCARBAZEPINE 300 MG: 300 TABLET, FILM COATED ORAL at 17:42

## 2021-04-27 RX ADMIN — METHYLPREDNISOLONE SODIUM SUCCINATE 40 MG: 40 INJECTION, POWDER, FOR SOLUTION INTRAMUSCULAR; INTRAVENOUS at 04:45

## 2021-04-27 RX ADMIN — ALBUTEROL SULFATE 2.5 MG: 2.5 SOLUTION RESPIRATORY (INHALATION) at 14:59

## 2021-04-27 RX ADMIN — Medication 10 MG: at 21:24

## 2021-04-27 RX ADMIN — IVABRADINE 7.5 MG: 7.5 TABLET, FILM COATED ORAL at 17:45

## 2021-04-27 RX ADMIN — FLUTICASONE PROPIONATE 110 MCG: 110 AEROSOL, METERED RESPIRATORY (INHALATION) at 04:46

## 2021-04-27 ASSESSMENT — PAIN DESCRIPTION - PAIN TYPE: TYPE: ACUTE PAIN

## 2021-04-27 ASSESSMENT — FIBROSIS 4 INDEX: FIB4 SCORE: 0.2
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
## 2021-04-27 NOTE — RESPIRATORY CARE
COPD EDUCATION by COPD CLINICAL EDUCATOR  4/27/2021 at 6:42 AM by Sulma Tate, RRT     Patient reviewed by COPD education team. 30 year old patient does not have a history or diagnosis of COPD and is a non-smoker.  Therefore does not qualify for the COPD program.    EMR has PFT and Sleep Study completed .    PFT results noted below. She has mild Restrictive process not COPD   Interpretation;   1.  Baseline spirometry shows mild reduction in FEV1 at 79% of   predicted and FVC at 76% of predicted with an FEV1/FVC ratio of   88.   2.  There is no significant bronchodilator response.   3.  Lung volumes are low normal with total lung capacity at 83%   of predicted.   4.  DLCO is low normal at 85% of predicted.   Pulmonary function tests are consistent with mild restrictive   process and may be secondary to obesity.  Suggest clinical   correlation
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
## 2021-04-27 NOTE — DIETARY
NUTRITION SERVICES: BMI - Pt with BMI >40 (=Body mass index is 42.95 kg/m².), Class III obesity. Weight loss counseling not appropriate in acute care setting. RECOMMEND - If appropriate at DC please refer to outpatient nutrition services for weight management.

## 2021-04-27 NOTE — CARE PLAN
Problem: Urinary Elimination:  Goal: Ability to reestablish a normal urinary elimination pattern will improve  Outcome: PROGRESSING SLOWER THAN EXPECTED  Flowsheets (Taken 4/27/2021 0312)  Urinary Elimination:   Frequency   Incontinence  Note: Urinary frequency and occasional incontinence. PMH of retention.      Problem: Respiratory:  Goal: Respiratory status will improve  Outcome: PROGRESSING AS EXPECTED  Note: Clear lung sounds, room air, good saturation. Declined 2300 breathing treatment.

## 2021-04-27 NOTE — PROGRESS NOTES
12hr cc complete    Monitor Summary:  Sinus rhythm - sinus tach   30 sec PSVT up to 180bpm  0.16 / 0.08 / 0.44

## 2021-04-27 NOTE — PROGRESS NOTES
Daily Progress Note:     Date of Service: 4/27/2021  Primary Team: [facility]   Attending: Kadeem Alvarenga M.D.   Senior Resident: Dr. Cherry  Intern: Dr. Carey  Contact:  [phone]    Chief Complaint:   Shortness of breath    Subjective:  Clarified with the patient about the detailed history. She was short of breath and wheezing at rest for 4 hours so decided to come to emergency department. Some cough which is baseline.Taking steroid 60 mg daily at home right now tapering down. Breathing treatments help and shortness of breath improved compared to yesterday.  Takes albuterol but ran out of other inhalers including flovent.She was seen by pulmonlogy most recently in January 2021. Patient states that breathing treatments help. Pt states that she uses 2-4 L oxygen on exertion. Chronic heart burn. 1 week of diffuse midepigastric abdominal pain, blood in stools and painful bowel movement, brown color. Also nausea but no vomiting. Being followed by gastroenterology who is planning on EGD and colonoscopy per patient.     Interval update and MDM:  -Had an episode of supraventricular tachycardia for 30 seconds. During that episode, she also had chest pain last night per patient. EKG was not showing ischemic changes. She has SVT at least once a month per patient. No chest pain or shortness of breath  -working on adjusting inhalers  -ordered home oxygen test. She did not require oxygen on home oxygen test.  -for chronic heartburn, ordered omeprazole  -was going to increase metoprolol if she gets tachycardic on walking, but she did not.     Consultants/Specialty:  None    Review of Systems:  negative except as above    Objective Data:   Physical Exam:   Vitals:   Temp:  [36.1 °C (96.9 °F)-36.9 °C (98.5 °F)] 36.9 °C (98.4 °F)  Pulse:  [60-98] 70  Resp:  [15-25] 16  BP: ()/(49-71) 97/49  SpO2:  [91 %-99 %] 94 %     Physical Exam  Vitals and nursing note reviewed.   Constitutional:       Appearance: Normal  appearance.   HENT:      Head: Normocephalic and atraumatic.      Nose: Nose normal. No congestion.      Mouth/Throat:      Mouth: Mucous membranes are moist.      Pharynx: Oropharynx is clear.   Eyes:      Extraocular Movements: Extraocular movements intact.      Conjunctiva/sclera: Conjunctivae normal.      Pupils: Pupils are equal, round, and reactive to light.   Cardiovascular:      Rate and Rhythm: Normal rate and regular rhythm.      Pulses: Normal pulses.      Heart sounds: Normal heart sounds. No murmur.   Pulmonary:      Effort: Pulmonary effort is normal. No respiratory distress.      Breath sounds: No wheezing.   Abdominal:      General: Abdomen is flat. Bowel sounds are normal.      Palpations: Abdomen is soft.      Tenderness: There is abdominal tenderness (Diffuse throughout mid epigastric area).   Musculoskeletal:         General: No swelling or tenderness. Normal range of motion.      Cervical back: Normal range of motion and neck supple.   Skin:     General: Skin is warm and dry.      Coloration: Skin is not jaundiced or pale.      Comments: Breast has blistering rash that is not painful without drainage. Breast inspection was done in the presence of a female chaperone.   Neurological:      Mental Status: She is alert and oriented to person, place, and time. Mental status is at baseline.       Labs:   Reviewed    Imaging:   Reviewed    Kristin is a 30 y.o. female with PMH of asthma, morbidly obese, type 2 diabetes, history of A. fib/SVT s/p ablation, chronic urinary retention on mirabegron, bowel bladder incontinence s/p spinal cord stimulator, history of sacral neuromodulation 3/21, on CellCept for optic neuritis, hypothyroidism, TONYA on CPAP, benign intracranial hypertension, peripheral neuropathy secondary to diabetes, schizoaffective disorder, fibromyalgia, PTSD, and GERD who presented 4/26/2021 with acute shortness of breath secondary to running out of inhalers and nebulizer. Currently on iv  solumedrol and inhalers.  A brief episode of nonsustained svt is normal for her, she has chest pain during the episode.    * Asthma exacerbation- (present on admission)  Assessment & Plan  She has a history of asthma and is on bronchodilator therapy at home.  Her last PFT done in 2019 showed FEV1 at 79% of predicted and FVC at 76% of predicted with an FEV1/FVC ratio of 88.  DLCO 85%.  This is consistent with mild obstructive disease. Also concern for obesity hypoventilation syndrome, atelectasis. This episode could have been exacerbated with dog exposure which she is allergic to and she also ran out of duoneb and flovent inhalers.     Plan:  -continuing methylprednisolone 40 mg IV twice daily along with bronchodilator therapy.    -added long acting beta (salmeterolol) to the inhaler regimen. Holding off on adding spiriva for now.  -She has tried taking montelukast and end up having cardiac effusion.   -Follow up with PCP and pulmonologist.    Diabetes mellitus type 2 in obese (HCC)- (present on admission)  Assessment & Plan  She is on trulicity at home.  Last dose of trulicity last Friday.     Plan;  insulin SSI and hypoglycemia management.    Peripheral neuropathy and chornic pain syndrome (CMS-HCC)- (present on admission)  Assessment & Plan  She is on multiple medications. Explained her that she is on too many medications and need to cut down on few medications such as she is on lyrica and gabapentin both for neuropathy. Increased gabapentin and stopped lyrica.  - She is on baclofen and robaxin for muscle spasms. Stopped robaxin. Can increase baclofen is asked.   - She is on melatonin, benadryl and trazodone for sleep. Stopped benadryl and trazodone and continued her on melatonin.   - Polypharmacy is an issue here.     Angina of effort (HCC)- (present on admission)  Assessment & Plan  She has a history of angina  And is taking aspirin and metoprolol at home. She was bradycardiac for a moment with HR of 58 in the  ED. She is also on ivabradin.    Plan:  -continue home baby aspirin  -continue home metoprolol  -continue home ivabradin    Mixed stress and urge urinary incontinence- (present on admission)  Assessment & Plan  On migabegron at home. Bladder scan unremarkable.    Optic neuritis- (present on admission)  Assessment & Plan  Continue cellcept home dose.     Hypothyroidism- (present on admission)  Assessment & Plan  Last TSh 1.24 4 months ago.  - Continue 100 mcg levothyroxine.     Code: full  Diet: regular  dvt prophylaxis: lovenox

## 2021-04-27 NOTE — CARE PLAN
Problem: Pain Management  Goal: Pain level will decrease to patient's comfort goal  Outcome: PROGRESSING AS EXPECTED     Problem: Respiratory:  Goal: Respiratory status will improve  Outcome: PROGRESSING AS EXPECTED     Problem: Communication  Goal: The ability to communicate needs accurately and effectively will improve  Outcome: PROGRESSING AS EXPECTED     Problem: Safety  Goal: Will remain free from injury  Outcome: PROGRESSING AS EXPECTED  Goal: Will remain free from falls  Outcome: PROGRESSING AS EXPECTED     Problem: Bowel/Gastric:  Goal: Normal bowel function is maintained or improved  Outcome: PROGRESSING AS EXPECTED  Goal: Will not experience complications related to bowel motility  Outcome: PROGRESSING AS EXPECTED     Problem: Knowledge Deficit  Goal: Knowledge of disease process/condition, treatment plan, diagnostic tests, and medications will improve  Outcome: PROGRESSING AS EXPECTED  Goal: Knowledge of the prescribed therapeutic regimen will improve  Outcome: PROGRESSING AS EXPECTED

## 2021-04-27 NOTE — PROGRESS NOTES
2RN skin check performed with Niles HASSAN  Ears intact.  Elbows intact  Heels calloused, but intact.  Abdomen has POA bruising from previous admission due to anticoagulant injections.   Back is intact.  Coccyx is intact except 2 scratches, POA. Photo will be taken and uploaded to [facility].

## 2021-04-28 VITALS
RESPIRATION RATE: 16 BRPM | SYSTOLIC BLOOD PRESSURE: 114 MMHG | WEIGHT: 241.18 LBS | HEIGHT: 64 IN | HEART RATE: 60 BPM | OXYGEN SATURATION: 93 % | BODY MASS INDEX: 41.18 KG/M2 | DIASTOLIC BLOOD PRESSURE: 61 MMHG | TEMPERATURE: 97.3 F

## 2021-04-28 LAB
ANION GAP SERPL CALC-SCNC: 11 MMOL/L (ref 7–16)
BUN SERPL-MCNC: 15 MG/DL (ref 8–22)
CALCIUM SERPL-MCNC: 9.3 MG/DL (ref 8.5–10.5)
CHLORIDE SERPL-SCNC: 107 MMOL/L (ref 96–112)
CO2 SERPL-SCNC: 20 MMOL/L (ref 20–33)
CREAT SERPL-MCNC: 0.7 MG/DL (ref 0.5–1.4)
GLUCOSE BLD-MCNC: 102 MG/DL (ref 65–99)
GLUCOSE BLD-MCNC: 152 MG/DL (ref 65–99)
GLUCOSE SERPL-MCNC: 135 MG/DL (ref 65–99)
HCT VFR BLD AUTO: 38.2 % (ref 37–47)
HEMOCCULT SP1 STL QL: NEGATIVE
HGB BLD-MCNC: 12 G/DL (ref 12–16)
MAGNESIUM SERPL-MCNC: 2.2 MG/DL (ref 1.5–2.5)
PHOSPHATE SERPL-MCNC: 2.9 MG/DL (ref 2.5–4.5)
POTASSIUM SERPL-SCNC: 4.8 MMOL/L (ref 3.6–5.5)
SODIUM SERPL-SCNC: 138 MMOL/L (ref 135–145)

## 2021-04-28 PROCEDURE — 94760 N-INVAS EAR/PLS OXIMETRY 1: CPT

## 2021-04-28 PROCEDURE — 700102 HCHG RX REV CODE 250 W/ 637 OVERRIDE(OP): Performed by: STUDENT IN AN ORGANIZED HEALTH CARE EDUCATION/TRAINING PROGRAM

## 2021-04-28 PROCEDURE — 80048 BASIC METABOLIC PNL TOTAL CA: CPT

## 2021-04-28 PROCEDURE — 84100 ASSAY OF PHOSPHORUS: CPT

## 2021-04-28 PROCEDURE — 700101 HCHG RX REV CODE 250: Performed by: INTERNAL MEDICINE

## 2021-04-28 PROCEDURE — 94640 AIRWAY INHALATION TREATMENT: CPT

## 2021-04-28 PROCEDURE — 82270 OCCULT BLOOD FECES: CPT

## 2021-04-28 PROCEDURE — 94660 CPAP INITIATION&MGMT: CPT

## 2021-04-28 PROCEDURE — 99239 HOSP IP/OBS DSCHRG MGMT >30: CPT | Mod: GC | Performed by: INTERNAL MEDICINE

## 2021-04-28 PROCEDURE — 700111 HCHG RX REV CODE 636 W/ 250 OVERRIDE (IP): Performed by: STUDENT IN AN ORGANIZED HEALTH CARE EDUCATION/TRAINING PROGRAM

## 2021-04-28 PROCEDURE — 82962 GLUCOSE BLOOD TEST: CPT | Mod: 91

## 2021-04-28 PROCEDURE — 36415 COLL VENOUS BLD VENIPUNCTURE: CPT

## 2021-04-28 PROCEDURE — 83735 ASSAY OF MAGNESIUM: CPT

## 2021-04-28 PROCEDURE — A9270 NON-COVERED ITEM OR SERVICE: HCPCS | Performed by: STUDENT IN AN ORGANIZED HEALTH CARE EDUCATION/TRAINING PROGRAM

## 2021-04-28 PROCEDURE — 85014 HEMATOCRIT: CPT

## 2021-04-28 PROCEDURE — 85018 HEMOGLOBIN: CPT

## 2021-04-28 RX ORDER — IPRATROPIUM BROMIDE AND ALBUTEROL SULFATE 2.5; .5 MG/3ML; MG/3ML
3 SOLUTION RESPIRATORY (INHALATION) EVERY 4 HOURS PRN
Qty: 90 ML | Refills: 1 | Status: SHIPPED | OUTPATIENT
Start: 2021-04-28 | End: 2021-05-23

## 2021-04-28 RX ADMIN — BUSPIRONE HYDROCHLORIDE 10 MG: 10 TABLET ORAL at 04:45

## 2021-04-28 RX ADMIN — ASPIRIN 81 MG: 81 TABLET, COATED ORAL at 04:47

## 2021-04-28 RX ADMIN — GABAPENTIN 400 MG: 400 CAPSULE ORAL at 12:40

## 2021-04-28 RX ADMIN — ALBUTEROL SULFATE 2.5 MG: 2.5 SOLUTION RESPIRATORY (INHALATION) at 07:06

## 2021-04-28 RX ADMIN — FLUTICASONE PROPIONATE 220 MCG: 110 AEROSOL, METERED RESPIRATORY (INHALATION) at 07:07

## 2021-04-28 RX ADMIN — GABAPENTIN 400 MG: 400 CAPSULE ORAL at 04:44

## 2021-04-28 RX ADMIN — IVABRADINE 7.5 MG: 7.5 TABLET, FILM COATED ORAL at 09:15

## 2021-04-28 RX ADMIN — ENOXAPARIN SODIUM 30 MG: 30 INJECTION SUBCUTANEOUS at 04:55

## 2021-04-28 RX ADMIN — OXCARBAZEPINE 300 MG: 300 TABLET, FILM COATED ORAL at 04:45

## 2021-04-28 RX ADMIN — ONDANSETRON 4 MG: 2 INJECTION INTRAMUSCULAR; INTRAVENOUS at 04:35

## 2021-04-28 RX ADMIN — LEVOTHYROXINE SODIUM 100 MCG: 0.1 TABLET ORAL at 04:45

## 2021-04-28 RX ADMIN — OMEPRAZOLE 40 MG: 20 CAPSULE, DELAYED RELEASE ORAL at 04:46

## 2021-04-28 RX ADMIN — BUSPIRONE HYDROCHLORIDE 10 MG: 10 TABLET ORAL at 12:40

## 2021-04-28 RX ADMIN — INSULIN LISPRO 1 UNITS: 100 INJECTION, SOLUTION INTRAVENOUS; SUBCUTANEOUS at 09:11

## 2021-04-28 RX ADMIN — BACLOFEN 10 MG: 10 TABLET ORAL at 12:40

## 2021-04-28 RX ADMIN — FERROUS SULFATE TAB 325 MG (65 MG ELEMENTAL FE) 325 MG: 325 (65 FE) TAB at 09:14

## 2021-04-28 RX ADMIN — MYCOPHENOLATE MOFETIL 500 MG: 250 CAPSULE ORAL at 04:45

## 2021-04-28 RX ADMIN — TOPIRAMATE 50 MG: 25 TABLET, FILM COATED ORAL at 04:47

## 2021-04-28 RX ADMIN — ONDANSETRON 4 MG: 2 INJECTION INTRAMUSCULAR; INTRAVENOUS at 12:37

## 2021-04-28 RX ADMIN — ACETAMINOPHEN 1000 MG: 500 TABLET ORAL at 09:13

## 2021-04-28 RX ADMIN — ZIPRASIDONE HYDROCHLORIDE 40 MG: 40 CAPSULE ORAL at 04:44

## 2021-04-28 RX ADMIN — METHYLPREDNISOLONE SODIUM SUCCINATE 40 MG: 40 INJECTION, POWDER, FOR SOLUTION INTRAMUSCULAR; INTRAVENOUS at 04:46

## 2021-04-28 RX ADMIN — SODIUM BICARBONATE 650 MG: 650 TABLET ORAL at 04:44

## 2021-04-28 RX ADMIN — BACLOFEN 10 MG: 10 TABLET ORAL at 04:47

## 2021-04-28 RX ADMIN — FLUOXETINE 40 MG: 20 CAPSULE ORAL at 04:46

## 2021-04-28 ASSESSMENT — PAIN DESCRIPTION - PAIN TYPE: TYPE: ACUTE PAIN;CHRONIC PAIN

## 2021-04-28 NOTE — PROGRESS NOTES
Assumed care at 0700, bedside report received from Trini HASSAN. Pt. Is sinus tach on the monitor. Initial assessment completed, orders reviewed, call light within reach, bed alarm is not in use, and hourly rounding in place. POC addressed with patient, no additional questions at this time.

## 2021-04-28 NOTE — PROGRESS NOTES
Daily Progress Note:     Date of Service: 4/28/2021  Primary Team: [facility]   Attending: Kadeem Alvarenga M.D.   Senior Resident: Dr. Cherry  Intern: Dr. Carey  Contact:  [phone]    Chief Complaint:   Shortness of breath    Subjective:  Patient denies chest pain, shortness of breath, worsening cough. Patient had a painful bowel movement yesterday and had some blood in stools, which happens intermittently for her. Hemoglobin is stable. She is following up with GI as planned. She is being discharged with advair, albuterolol,duoneb.      Consultants/Specialty:  None    Review of Systems:  negative except as above    Objective Data:   Physical Exam:   Vitals:   Temp:  [36.1 °C (96.9 °F)-36.5 °C (97.7 °F)] 36.3 °C (97.3 °F)  Pulse:  [60-98] 60  Resp:  [16-18] 16  BP: (105-121)/(41-73) 114/61  SpO2:  [90 %-96 %] 93 %     Physical Exam  Vitals and nursing note reviewed.   Constitutional:       Appearance: Normal appearance.   HENT:      Head: Normocephalic and atraumatic.      Nose: Nose normal. No congestion.      Mouth/Throat:      Mouth: Mucous membranes are moist.      Pharynx: Oropharynx is clear.   Eyes:      Extraocular Movements: Extraocular movements intact.      Conjunctiva/sclera: Conjunctivae normal.      Pupils: Pupils are equal, round, and reactive to light.   Cardiovascular:      Rate and Rhythm: Normal rate and regular rhythm.      Pulses: Normal pulses.      Heart sounds: Normal heart sounds. No murmur.   Pulmonary:      Effort: Pulmonary effort is normal. No respiratory distress.      Breath sounds: No wheezing.   Abdominal:      General: Abdomen is flat. Bowel sounds are normal.      Palpations: Abdomen is soft.      Tenderness: There is abdominal tenderness (chronic.).   Musculoskeletal:         General: No swelling or tenderness. Normal range of motion.      Cervical back: Normal range of motion and neck supple.   Skin:     General: Skin is warm and dry.      Coloration: Skin is not jaundiced  or pale.   Neurological:      Mental Status: She is alert and oriented to person, place, and time. Mental status is at baseline.       Labs:   Reviewed    Imaging:   Reviewed    Kristin is a 30 y.o. female with PMH of asthma, morbidly obese, type 2 diabetes, history of A. fib/SVT s/p ablation, chronic urinary retention on mirabegron, bowel bladder incontinence s/p spinal cord stimulator, history of sacral neuromodulation 3/21, on CellCept for optic neuritis, hypothyroidism, TONYA on CPAP, benign intracranial hypertension, peripheral neuropathy secondary to diabetes, schizoaffective disorder, fibromyalgia, PTSD, and GERD who presented 4/26/2021 with acute shortness of breath secondary to running out of inhalers and nebulizer.      * Asthma exacerbation- (present on admission)  Assessment & Plan  She has a history of asthma and is on bronchodilator therapy at home.  Her last PFT done in 2019 showed FEV1 at 79% of predicted and FVC at 76% of predicted with an FEV1/FVC ratio of 88.  DLCO 85%.  This is consistent with mild obstructive disease. Also concern for obesity hypoventilation syndrome, atelectasis. This episode could have been exacerbated with dog exposure which she is allergic to and she also ran out of duoneb and flovent inhalers.     Plan:  -advair, duoneb, albuterol on discharge. Holding off on adding spiriva for now.  -no steroid on discharge.  -She has tried taking montelukast and end up having cardiac effusion.   -Follow up with PCP and pulmonologist.    Diabetes mellitus type 2 in obese (HCC)- (present on admission)  Assessment & Plan  She is on trulicity at home.  Last dose of trulicity last Friday.     Plan;  insulin SSI and hypoglycemia management.    Peripheral neuropathy and chornic pain syndrome (CMS-HCC)- (present on admission)  Assessment & Plan  She is on multiple medications. Explained her that she is on too many medications and need to cut down on few medications such as she is on lyrica and  gabapentin both for neuropathy. Increased gabapentin and stopped lyrica.  - She is on baclofen and robaxin for muscle spasms. Stopped robaxin. Can increase baclofen is asked.   - She is on melatonin, benadryl and trazodone for sleep. Stopped benadryl and trazodone and continued her on melatonin.   - Polypharmacy is an issue here.     Angina of effort (HCC)- (present on admission)  Assessment & Plan  She has a history of angina  And is taking aspirin and metoprolol at home. She was bradycardiac for a moment with HR of 58 in the ED. She is also on ivabradin.    Plan:  -continue home baby aspirin  -continue home metoprolol  -continue home ivabradin    Mixed stress and urge urinary incontinence- (present on admission)  Assessment & Plan  On migabegron at home. Bladder scan unremarkable.    Optic neuritis- (present on admission)  Assessment & Plan  Continue cellcept home dose.     Hypothyroidism- (present on admission)  Assessment & Plan  Last TSh 1.24 4 months ago.  - Continue 100 mcg levothyroxine.     Code: full  Diet: regular  dvt prophylaxis: lovenox

## 2021-04-28 NOTE — PROGRESS NOTES
12hr cc complete    Monitor Summary:  Sinus Rhythm 61-74 bpm  Ectopy: rare PACs, rare PVCs  0.14 / 0.10 / 0.40

## 2021-04-28 NOTE — PROGRESS NOTES
Pt dc'd home. IV and monitor removed; monitor room notified. Pt left unit via wheelchair with rn. Personal belongings with pt when leaving unit. Pt given discharge instructions prior to leaving unit including where to  prescriptions and when to follow-up; verbalizes understanding. Copy of discharge instructions with pt and in the chart.

## 2021-04-28 NOTE — CARE PLAN
Problem: Pain Management  Goal: Pain level will decrease to patient's comfort goal  Outcome: PROGRESSING AS EXPECTED     Problem: Respiratory:  Goal: Respiratory status will improve  Outcome: PROGRESSING AS EXPECTED  Note: O2 saturation remains stable (>90%)

## 2021-04-28 NOTE — DISCHARGE INSTRUCTIONS
Follow up with pulmonology within 2-3 weeks.  Follow up with gastroenterology within 1-2 weeks. Schedule EGD and colonoscopy.  Follow up with your primary care doctor within 1 week.  Follow up with your psychiatrist([facility]) in 1-2 weeks to ask about tapering your medications. There is a concern for polypharmacy causing respiratory problems since patient already has asthma and suspected obesity hypoventilation syndrome.    Discharge Instructions    Discharged to home by car with escort. Discharged via wheelchair, hospital escort: Yes.  Special equipment needed: Not Applicable    Be sure to schedule a follow-up appointment with your primary care doctor or any specialists as instructed.     Discharge Plan:   Diet Plan: Discussed  Activity Level: Discussed  Confirmed Follow up Appointment: Patient to Call and Schedule Appointment  Confirmed Symptoms Management: Discussed  Medication Reconciliation Updated: Yes    I understand that a diet low in cholesterol, fat, and sodium is recommended for good health. Unless I have been given specific instructions below for another diet, I accept this instruction as my diet prescription.   Other diet: Diabetic diet    Special Instructions: None    · Is patient discharged on Warfarin / Coumadin?   No     Depression / Suicide Risk    As you are discharged from this [facility] facility, it is important to learn how to keep safe from harming yourself.    Recognize the warning signs:  · Abrupt changes in personality, positive or negative- including increase in energy   · Giving away possessions  · Change in eating patterns- significant weight changes-  positive or negative  · Change in sleeping patterns- unable to sleep or sleeping all the time   · Unwillingness or inability to communicate  · Depression  · Unusual sadness, discouragement and loneliness  · Talk of wanting to die  · Neglect of personal appearance   · Rebelliousness- reckless behavior  · Withdrawal from  people/activities they love  · Confusion- inability to concentrate     If you or a loved one observes any of these behaviors or has concerns about self-harm, here's what you can do:  · Talk about it- your feelings and reasons for harming yourself  · Remove any means that you might use to hurt yourself (examples: pills, rope, extension cords, firearm)  · Get professional help from the community (Mental Health, Substance Abuse, psychological counseling)  · Do not be alone:Call your Safe Contact- someone whom you trust who will be there for you.  · Call your local CRISIS HOTLINE 746-5970 or 365-530-9851  · Call your local Children's Mobile Crisis Response Team [place] (510) 098-5878 or www.[employer]  · Call the toll free National Suicide Prevention Hotlines   · National Suicide Prevention Lifeline 714-335-LPJT (3146)  · [place] [facility] 800-SUICIDE (644-6057)        Asthma, Adult    Asthma is a long-term (chronic) condition in which the airways get tight and narrow. The airways are the breathing passages that lead from the nose and mouth down into the lungs. A person with asthma will have times when symptoms get worse. These are called asthma attacks. They can cause coughing, whistling sounds when you breathe (wheezing), shortness of breath, and chest pain. They can make it hard to breathe. There is no cure for asthma, but medicines and lifestyle changes can help control it.  There are many things that can bring on an asthma attack or make asthma symptoms worse (triggers). Common triggers include:  · Mold.  · Dust.  · Cigarette smoke.  · Cockroaches.  · Things that can cause allergy symptoms (allergens). These include animal skin flakes (dander) and pollen from trees or grass.  · Things that pollute the air. These may include household , wood smoke, smog, or chemical odors.  · Cold air, weather changes, and wind.  · Crying or laughing hard.  · Stress.  · Certain medicines or  drugs.  · Certain foods such as dried fruit, potato chips, and grape juice.  · Infections, such as a cold or the flu.  · Certain medical conditions or diseases.  · Exercise or tiring activities.  Asthma may be treated with medicines and by staying away from the things that cause asthma attacks. Types of medicines may include:  · Controller medicines. These help prevent asthma symptoms. They are usually taken every day.  · Fast-acting reliever or rescue medicines. These quickly relieve asthma symptoms. They are used as needed and provide short-term relief.  · Allergy medicines if your attacks are brought on by allergens.  · Medicines to help control the body's defense (immune) system.  Follow these instructions at home:  Avoiding triggers in your home  · Change your heating and air conditioning filter often.  · Limit your use of fireplaces and wood stoves.  · Get rid of pests (such as roaches and mice) and their droppings.  · Throw away plants if you see mold on them.  · Clean your floors. Dust regularly. Use cleaning products that do not smell.  · Have someone vacuum when you are not home. Use a vacuum  with a HEPA filter if possible.  · Replace carpet with wood, tile, or vinyl jodi. Carpet can trap animal skin flakes and dust.  · Use allergy-proof pillows, mattress covers, and box spring covers.  · Wash bed sheets and blankets every week in hot water. Dry them in a dryer.  · Keep your bedroom free of any triggers.  · Avoid pets and keep windows closed when things that cause allergy symptoms are in the air.  · Use blankets that are made of polyester or cotton.  · Clean bathrooms and sandi with bleach. If possible, have someone repaint the walls in these rooms with mold-resistant paint. Keep out of the rooms that are being cleaned and painted.  · Wash your hands often with soap and water. If soap and water are not available, use hand .  · Do not allow anyone to smoke in your home.  General  instructions  · Take over-the-counter and prescription medicines only as told by your doctor.  ? Talk with your doctor if you have questions about how or when to take your medicines.  ? Make note if you need to use your medicines more often than usual.  · Do not use any products that contain nicotine or tobacco, such as cigarettes and e-cigarettes. If you need help quitting, ask your doctor.  · Stay away from secondhand smoke.  · Avoid doing things outdoors when allergen counts are high and when air quality is low.  · Wear a ski mask when doing outdoor activities in the winter. The mask should cover your nose and mouth. Exercise indoors on cold days if you can.  · Warm up before you exercise. Take time to cool down after exercise.  · Use a peak flow meter as told by your doctor. A peak flow meter is a tool that measures how well the lungs are working.  · Keep track of the peak flow meter's readings. Write them down.  · Follow your asthma action plan. This is a written plan for taking care of your asthma and treating your attacks.  · Make sure you get all the shots (vaccines) that your doctor recommends. Ask your doctor about a flu shot and a pneumonia shot.  · Keep all follow-up visits as told by your doctor. This is important.  Contact a doctor if:  · You have wheezing, shortness of breath, or a cough even while taking medicine to prevent attacks.  · The mucus you cough up (sputum) is thicker than usual.  · The mucus you cough up changes from clear or white to yellow, green, gray, or bloody.  · You have problems from the medicine you are taking, such as:  ? A rash.  ? Itching.  ? Swelling.  ? Trouble breathing.  · You need reliever medicines more than 2-3 times a week.  · Your peak flow reading is still at 50-79% of your personal best after following the action plan for 1 hour.  · You have a fever.  Get help right away if:  · You seem to be worse and are not responding to medicine during an asthma attack.  · You  are short of breath even at rest.  · You get short of breath when doing very little activity.  · You have trouble eating, drinking, or talking.  · You have chest pain or tightness.  · You have a fast heartbeat.  · Your lips or fingernails start to turn blue.  · You are light-headed or dizzy, or you faint.  · Your peak flow is less than 50% of your personal best.  · You feel too tired to breathe normally.  Summary  · Asthma is a long-term (chronic) condition in which the airways get tight and narrow. An asthma attack can make it hard to breathe.  · Asthma cannot be cured, but medicines and lifestyle changes can help control it.  · Make sure you understand how to avoid triggers and how and when to use your medicines.  This information is not intended to replace advice given to you by your health care provider. Make sure you discuss any questions you have with your health care provider.  Document Released: 06/05/2009 Document Revised: 02/20/2020 Document Reviewed: 01/22/2018  Elsevier Patient Education © 2020 [employer]

## 2021-04-28 NOTE — DISCHARGE SUMMARY
Discharge Summary    Date of Admission: 4/26/2021  Date of Discharge: 4/28/2021  Discharging Attending: Kadeem Alvarenga M.D.   Discharging Senior Resident: Dr. Jaz Cherry  Discharging Intern: Dr. Tariq Carey    CHIEF COMPLAINT ON ADMISSION  Chief Complaint   Patient presents with   • Shortness of Breath       Reason for Admission  Shortness of breath    Admission Date  4/26/2021    CODE STATUS  Full Code    HPI & HOSPITAL COURSE  This is a 30 y.o. female here with PMH of asthma, morbidly obese, type 2 diabetes, history of A. fib/SVT s/p ablation, chronic urinary retention on mirabegron, bowel bladder incontinence s/p spinal cord stimulator, history of sacral neuromodulation 3/21, on CellCept for optic neuritis, hypothyroidism, TONYA on CPAP, benign intracranial hypertension, peripheral neuropathy secondary to diabetes, schizoaffective disorder, fibromyalgia, PTSD, and GERD who presented 4/26/2021 with acute shortness of breath secondary to running out of inhalers and her nebulizers. On home oxygen test, patient did not require oxygen. Patient is discharged on advair 250, duoneb, albuterol. No steroid.     # Asthma exacerbation  History of asthma and is on bronchodilator therapy at home.  Her last PFT done in 2019 showed FEV1 at 79% of predicted and FVC at 76% of predicted with an FEV1/FVC ratio of 88, DLCO 85%. This is consistent with mild obstructive disease. She has tried taking montelukast in the past and end up having cardiac effusion per chart review. There was also concern for obesity hypoventilation syndrome and atelectasis, but her CO2 levels were normal and previous admission ABGs did not suggest hypercapnia and levels were also normal. This episode could have been exacerbated with dog exposure which she is allergic to in addition to running out of her duoneb and flovent inhalers. She was continued on her methylprednisolone 40 mg IV twice daily along with bronchodilator therapy. Long acting beta  (salmeterolol) was added to her inhaler regimen. She was optimized prior to discharge with her inhaler therapy, and was instructed to follow-up up with her PCP and pulmonologist. On home oxygen test, patient did not require oxygen. Patient is discharged on advair 250, duoneb, albuterol. No steroid on discharge.       # Diabetes mellitus type 2 in obese  On trulicity at home.  Last dose of trulicity Friday prior to admission (4/23). Was continued on sliding scale insuline with hypoglycemia protocol during hospital course.  Continue trulicity at home.    # Peripheral neuropathy and chornic pain syndrome  On multiple medications. It was explained her that she is on too many medications and need to cut down on few medications such as she is on lyrica and gabapentin both for neuropathy. Gabapentin dose was increased and stopped lyrica throughout admission and patient did not require lyrica while staying here so discontinued lyrica on discharge. She is on baclofen and robaxin for muscle spasms, robaxin was stopped, and baclofen was continued on home dose. So discontinued robaxin and continuing baclofen on discharge. For sleep she is on melatonin, benadryl and trazodone at home, and both benadryl and trazodone were discontinued, and she was continued on melatonin. Polypharmacy is definitely an issue here, and she was instructed to follow-up with her PCP to decrease polypharmacy. Thus, discontinued trazodone and benadryl on discharge and continuing melatonin at home.     # Angina of effort   History of angina, on aspirin, metoprolol, and ivabradin at home. She was bradycardiac for a moment with HR of 58 in the ED, but this resolved and she was stable from this standpoint. Ivabradin was continued during admission and thus continued on discharge.     # Mixed stress and urge urinary incontinence  On mirabegron at home. Bladder scan unremarkable during admission. Patient was on mirabegron for urinary incontinence and urgency at  home. Due to polypharmacy, discontinued mirabegron during admission. Thus, discontinued this medication on discharge, and instructed patient to use pad instead for her urinary incontinence and patient agreed to do so.     # Optic neuritis  Was continued on cellcept home dose.      # Hypothyroidism  Last TSh 1.24 4 months ago, and she was continued on her  100 mcg levothyroxine home dose.     Therefore, she is discharged in good and stable condition to home with close outpatient follow-up.    The patient met 2-midnight criteria for an inpatient stay at the time of discharge.    PHYSICAL EXAM ON DISCHARGE  Temp:  [36.1 °C (96.9 °F)-36.5 °C (97.7 °F)] 36.3 °C (97.3 °F)  Pulse:  [60-98] 60  Resp:  [16-18] 16  BP: (105-121)/(41-73) 114/61  SpO2:  [90 %-96 %] 93 %    Physical Exam  Vitals and nursing note reviewed.   Constitutional:       Appearance: Normal appearance.   HENT:      Head: Normocephalic and atraumatic.      Nose: Nose normal. No congestion.      Mouth/Throat:      Mouth: Mucous membranes are moist.      Pharynx: Oropharynx is clear.   Eyes:      Extraocular Movements: Extraocular movements intact.      Conjunctiva/sclera: Conjunctivae normal.      Pupils: Pupils are equal, round, and reactive to light.   Cardiovascular:      Rate and Rhythm: Normal rate and regular rhythm.      Pulses: Normal pulses.      Heart sounds: Normal heart sounds. No murmur.   Pulmonary:      Effort: Pulmonary effort is normal. No respiratory distress.      Breath sounds: No wheezing.   Abdominal:      General: Abdomen is flat. Bowel sounds are normal.      Palpations: Abdomen is soft.      Tenderness: There is abdominal tenderness (chronic.).   Musculoskeletal:         General: No swelling or tenderness. Normal range of motion.      Cervical back: Normal range of motion and neck supple.   Skin:     General: Skin is warm and dry.      Coloration: Skin is not jaundiced or pale.   Neurological:      Mental Status: She is alert and  oriented to person, place, and time. Mental status is at baseline.         Discharge Date  04/28/2021    FOLLOW UP ITEMS POST DISCHARGE  -Follow up with pulmonology within 2-3 weeks.  -Follow up with gastroenterology within 1-2 weeks. Schedule EGD and colonoscopy.  -Follow up with your primary care doctor within 1 week.  -Follow up with your psychiatrist([facility]) in 1-2 weeks to ask about tapering your medications      DISCHARGE DIAGNOSES  Principal Problem:    Asthma exacerbation (Chronic) POA: Yes  Active Problems:    Peripheral neuropathy and chornic pain syndrome (CMS-HCC) (Chronic) POA: Yes    Diabetes mellitus type 2 in obese (HCC) POA: Yes    Hypothyroidism (Chronic) POA: Yes    Optic neuritis POA: Yes    Mixed stress and urge urinary incontinence POA: Yes    Angina of effort (HCC) POA: Yes  Resolved Problems:    * No resolved hospital problems. *      FOLLOW UP  Future Appointments   Date Time Provider Department Center   4/30/2021 10:00 AM Sue Preston M.D. [facility] [place]   6/17/2021  8:20 AM John Leon M.D. [facility] None   7/7/2021 11:30 AM Kayla Wise D.O. [facility] None     Torres Brody M.D.  [address]  [facility] [address]  [phone]    Schedule an appointment as soon as possible for a visit in 1 week        MEDICATIONS ON DISCHARGE     Medication List      START taking these medications      Instructions   fluticasone-salmeterol 250-50 MCG/DOSE Aepb  Commonly known as: ADVAIR   Inhale 1 Puff 2 times a day for 60 days.  Dose: 1 Puff        CHANGE how you take these medications      Instructions   busPIRone 10 MG Tabs tablet  What changed: Another medication with the same name was removed. Continue taking this medication, and follow the directions you see here.  Commonly known as: BUSPAR   Take 10 mg by mouth 3 times a day.  Dose: 10 mg     * ipratropium-albuterol 0.5-2.5 (3) MG/3ML nebulizer solution  What changed: Another medication with the same name was added.  Make sure you understand how and when to take each.  Commonly known as: DUONEB   Take 3 mL by nebulization every four hours as needed for Shortness of Breath. Nebulizer  Dose: 3 mL     * ipratropium-albuterol 0.5-2.5 (3) MG/3ML nebulizer solution  What changed: You were already taking a medication with the same name, and this prescription was added. Make sure you understand how and when to take each.  Commonly known as: DUONEB   Take 3 mL by nebulization every four hours as needed for Shortness of Breath for up to 30 days.  Dose: 3 mL     metoprolol SR 25 MG Tb24  What changed: additional instructions  Commonly known as: TOPROL XL   Take 0.5 Tablets by mouth every evening.  Dose: 12.5 mg     mycophenolate 500 MG tablet  What changed:   · how much to take  · additional instructions  Commonly known as: CellCept   Doctor's comments: The patient needs to get future refills from her neuro-ophthalmologist, Dr. Yousif  Take 2 Tablets by mouth 2 times a day.  Dose: 1,000 mg     Trulicity 1.5 MG/0.5ML Sopn  What changed: when to take this  Generic drug: Dulaglutide   Inject 0.5 mL under the skin every 7 days.  Dose: 0.5 mL     ziprasidone 40 MG Caps  What changed:   · how much to take  · how to take this  · when to take this  · additional instructions  Commonly known as: GEODON   Take 1 tablet by mouth twice a day         * This list has 2 medication(s) that are the same as other medications prescribed for you. Read the directions carefully, and ask your doctor or other care provider to review them with you.            CONTINUE taking these medications      Instructions   albuterol 108 (90 Base) MCG/ACT Aers inhalation aerosol   Inhale 2 Puffs every 6 hours as needed for Shortness of Breath.  Dose: 2 Puff     aspirin 81 MG EC tablet   Take 81 mg by mouth every morning.  Dose: 81 mg     baclofen 10 MG Tabs  Commonly known as: LIORESAL   Take 1 tablet by mouth 3 times a day.  Dose: 10 mg     Corlanor 7.5 MG Tabs  tablet  Generic drug: ivabradine   Take 1 tablet by mouth 2 times a day, with meals.  Dose: 7.5 mg     ferrous sulfate 325 (65 Fe) MG tablet   Take 1 tablet by mouth every morning with breakfast.  Dose: 325 mg     fluoxetine 40 MG capsule  Commonly known as: PROZAC   Take 1 capsule by mouth every day.  Dose: 40 mg     gabapentin 300 MG Caps  Commonly known as: NEURONTIN   Take 300 mg by mouth 3 times a day.  Dose: 300 mg     levothyroxine 100 MCG Tabs  Commonly known as: SYNTHROID   Take 100 mcg by mouth Every morning on an empty stomach.  Dose: 100 mcg     Melatonin 10 MG Tabs   Take 10 mg by mouth every bedtime.  Dose: 10 mg     OXcarbazepine 300 MG Tabs  Commonly known as: Trileptal   Take 1 tablet by mouth 2 times a day.  Dose: 300 mg     potassium chloride SA 20 MEQ Tbcr  Commonly known as: Kdur   Take 40 mEq by mouth 2 times a day. 2 tablets = 40 mEq.  Dose: 40 mEq     sodium bicarbonate 325 MG Tabs   Take 650 mg by mouth 2 times a day. 2 tabs = 650 mg  Dose: 650 mg     topiramate 50 MG tablet  Commonly known as: TOPAMAX   Take 50 mg by mouth 2 times a day.  Dose: 50 mg     vitamin D (Ergocalciferol) 1.25 MG (09575 UT) Caps capsule  Commonly known as: DRISDOL   Take 50,000 Units by mouth every Friday. In the morning.  Dose: 50,000 Units        STOP taking these medications    benzonatate 100 MG Caps  Commonly known as: TESSALON     diphenhydrAMINE 25 MG Tabs  Commonly known as: BENADRYL     doxycycline 100 MG Tabs  Commonly known as: VIBRAMYCIN     Flovent  MCG/ACT Aero  Generic drug: fluticasone     ibuprofen 400 MG Tabs  Commonly known as: MOTRIN     methocarbamol 750 MG Tabs  Commonly known as: ROBAXIN     metoclopramide 10 MG Tabs  Commonly known as: REGLAN     Myrbetriq 50 MG Tb24  Generic drug: Mirabegron ER     predniSONE 20 MG Tabs  Commonly known as: DELTASONE     pregabalin 300 MG capsule  Commonly known as: Lyrica     traZODone 100 MG Tabs  Commonly known as: DESYREL       "      Allergies  Allergies   Allergen Reactions   • Depakote [Divalproex Sodium] Unspecified     Muscle spasms/muscle pain and weakness     • Montelukast [Singulair]      Cardiac effusion   • Amitriptyline Unspecified     Headaches     • Aripiprazole [Abilify] Unspecified     Headaches/muscle twitching     • Clindamycin Nausea     Even with food     • Flagyl [Metronidazole Hcl] Unspecified     \"eye problems\"     • Flomax [Tamsulosin Hydrochloride] Swelling   • Levaquin Unspecified     Severe muscle cramps in legs  RXN=unknown   • Metformin Unspecified     Increased lactic acid      • Tamsulosin Swelling     Swelling of legs   • Tape Rash     Tears skin off  coban with Tegaderm tape ok intermittently  RXN=ongoing   • Vancomycin Itching     Pt becomes flushed in face and chest.   RXN=7/10/16   • Wound Dressing Adhesive Hives     By pt report   • Ampicillin Rash     Pt reports that she received a rash    • Ciprofloxacin Rash         • Keflex Rash     Pt states she gets a rash with this medication  Tolerates ceftriaxone  Can take with Benadryl   • Levofloxacin Unspecified     Leg muscle cramps   • Metronidazole Rash     \"Vision problems\"   • Penicillins Hives     Can take with Benadryl   • Sulfa Drugs Itching and Myalgia     Muscle pain and weakness   • Valproic Acid Rash     .       DIET  Orders Placed This Encounter   Procedures   • Diet Order Diet: Regular     Standing Status:   Standing     Number of Occurrences:   1     Order Specific Question:   Diet:     Answer:   Regular [1]       ACTIVITY  As tolerated.  Weight bearing as tolerated    CONSULTATIONS  None    PROCEDURES  None  "

## 2021-04-28 NOTE — CARE PLAN
Problem: Communication  Goal: The ability to communicate needs accurately and effectively will improve  Outcome: PROGRESSING AS EXPECTED  Intervention: [place] patient and significant other/support system to call light to alert staff of needs  Flowsheets (Taken 4/28/2021 0956)  Oriented to::   Unit Routine   Bedside Report   Location of bathroom     Problem: Safety  Goal: Will remain free from falls  Outcome: PROGRESSING AS EXPECTED  Intervention: Assess risk factors for falls  Flowsheets  Taken 4/28/2021 0956 by Jorge Farias REffieNEffie  History of fall: 2  Mobility Status Assessment: 1-1 Healthcare Provider Required for Assistance with Ambulation & Transfer  Risk for Injury-Any positive answers results in the pt being at high risk for fall related injury: Not Applicable  Taken 4/27/2021 2100 by Trini Rico R.NEffie  Pt Calls for Assistance: Yes     Problem: Psychosocial Needs:  Goal: Level of anxiety will decrease  Outcome: PROGRESSING AS EXPECTED     Problem: Urinary Elimination:  Goal: Ability to reestablish a normal urinary elimination pattern will improve  Outcome: PROGRESSING AS EXPECTED

## 2021-04-30 ENCOUNTER — PATIENT OUTREACH (OUTPATIENT)
Dept: HEALTH INFORMATION MANAGEMENT | Facility: OTHER | Age: 31
End: 2021-04-30

## 2021-04-30 SDOH — ECONOMIC STABILITY: FOOD INSECURITY: WITHIN THE PAST 12 MONTHS, THE FOOD YOU BOUGHT JUST DIDN'T LAST AND YOU DIDN'T HAVE MONEY TO GET MORE.: SOMETIMES TRUE

## 2021-04-30 SDOH — ECONOMIC STABILITY: FOOD INSECURITY: WITHIN THE PAST 12 MONTHS, YOU WORRIED THAT YOUR FOOD WOULD RUN OUT BEFORE YOU GOT MONEY TO BUY MORE.: SOMETIMES TRUE

## 2021-04-30 ASSESSMENT — SOCIAL DETERMINANTS OF HEALTH (SDOH): HOW HARD IS IT FOR YOU TO PAY FOR THE VERY BASICS LIKE FOOD, HOUSING, MEDICAL CARE, AND HEATING?: SOMEWHAT HARD

## 2021-04-30 NOTE — PROGRESS NOTES
Community Health Worker Intake  • Social determinates of health intake Complete.   • Identified barriers to food.  • Contact information provided to Kristin Balderrama  • Has PCP appointment scheduled   • Scheduled Food Delivery  • Outpatient assessment completed.  • Did the patient receive medications post discharge: Yes    CHW Carlee called pt to complete outpatient assessment. Pt states a need for food, CHW added pt to food delivery for next Friday. Pt identifies no other barriers to resources and expressed no other needs at this time.     Plan: CHW will deliver food to pt and conduct inhome assessment.

## 2021-05-03 ENCOUNTER — HOSPITAL ENCOUNTER (EMERGENCY)
Facility: MEDICAL CENTER | Age: 31
End: 2021-05-03
Attending: EMERGENCY MEDICINE
Payer: MEDICARE

## 2021-05-03 ENCOUNTER — APPOINTMENT (OUTPATIENT)
Dept: RADIOLOGY | Facility: MEDICAL CENTER | Age: 31
End: 2021-05-03
Attending: EMERGENCY MEDICINE
Payer: MEDICARE

## 2021-05-03 VITALS
RESPIRATION RATE: 15 BRPM | WEIGHT: 252.65 LBS | TEMPERATURE: 97.3 F | HEIGHT: 65 IN | DIASTOLIC BLOOD PRESSURE: 68 MMHG | SYSTOLIC BLOOD PRESSURE: 126 MMHG | OXYGEN SATURATION: 93 % | HEART RATE: 88 BPM | BODY MASS INDEX: 42.09 KG/M2

## 2021-05-03 DIAGNOSIS — F32.A DEPRESSION, UNSPECIFIED DEPRESSION TYPE: ICD-10-CM

## 2021-05-03 DIAGNOSIS — R45.851 SUICIDAL IDEATION: ICD-10-CM

## 2021-05-03 DIAGNOSIS — R06.4 HYPERVENTILATING: ICD-10-CM

## 2021-05-03 DIAGNOSIS — F41.9 ANXIETY: ICD-10-CM

## 2021-05-03 LAB
AMPHET UR QL SCN: NEGATIVE
BARBITURATES UR QL SCN: NEGATIVE
BENZODIAZ UR QL SCN: NEGATIVE
BZE UR QL SCN: NEGATIVE
CANNABINOIDS UR QL SCN: NEGATIVE
EKG IMPRESSION: NORMAL
EKG IMPRESSION: NORMAL
FLUAV RNA SPEC QL NAA+PROBE: NEGATIVE
FLUBV RNA SPEC QL NAA+PROBE: NEGATIVE
HCG UR QL: NEGATIVE
METHADONE UR QL SCN: NEGATIVE
OPIATES UR QL SCN: NEGATIVE
OXYCODONE UR QL SCN: NEGATIVE
PCP UR QL SCN: NEGATIVE
POC BREATHALIZER: 0 PERCENT (ref 0–0.01)
PROPOXYPH UR QL SCN: NEGATIVE
RSV RNA SPEC QL NAA+PROBE: NEGATIVE
SARS-COV-2 RNA RESP QL NAA+PROBE: NOTDETECTED
SPECIMEN SOURCE: NORMAL

## 2021-05-03 PROCEDURE — 81025 URINE PREGNANCY TEST: CPT

## 2021-05-03 PROCEDURE — 700102 HCHG RX REV CODE 250 W/ 637 OVERRIDE(OP): Performed by: EMERGENCY MEDICINE

## 2021-05-03 PROCEDURE — 700111 HCHG RX REV CODE 636 W/ 250 OVERRIDE (IP): Performed by: EMERGENCY MEDICINE

## 2021-05-03 PROCEDURE — 93005 ELECTROCARDIOGRAM TRACING: CPT | Performed by: EMERGENCY MEDICINE

## 2021-05-03 PROCEDURE — 700101 HCHG RX REV CODE 250: Performed by: EMERGENCY MEDICINE

## 2021-05-03 PROCEDURE — A9270 NON-COVERED ITEM OR SERVICE: HCPCS | Performed by: EMERGENCY MEDICINE

## 2021-05-03 PROCEDURE — 80307 DRUG TEST PRSMV CHEM ANLYZR: CPT

## 2021-05-03 PROCEDURE — 302970 POC BREATHALIZER: Performed by: EMERGENCY MEDICINE

## 2021-05-03 PROCEDURE — 94640 AIRWAY INHALATION TREATMENT: CPT

## 2021-05-03 PROCEDURE — 99285 EMERGENCY DEPT VISIT HI MDM: CPT

## 2021-05-03 PROCEDURE — 0241U HCHG SARS-COV-2 COVID-19 NFCT DS RESP RNA 4 TRGT MIC: CPT

## 2021-05-03 PROCEDURE — 71045 X-RAY EXAM CHEST 1 VIEW: CPT

## 2021-05-03 RX ORDER — ZIPRASIDONE HYDROCHLORIDE 40 MG/1
40 CAPSULE ORAL 2 TIMES DAILY
Status: DISCONTINUED | OUTPATIENT
Start: 2021-05-03 | End: 2021-05-04 | Stop reason: HOSPADM

## 2021-05-03 RX ORDER — BUSPIRONE HYDROCHLORIDE 10 MG/1
10 TABLET ORAL 3 TIMES DAILY
Status: DISCONTINUED | OUTPATIENT
Start: 2021-05-03 | End: 2021-05-04 | Stop reason: HOSPADM

## 2021-05-03 RX ORDER — BACLOFEN 10 MG/1
10 TABLET ORAL 3 TIMES DAILY
Status: DISCONTINUED | OUTPATIENT
Start: 2021-05-03 | End: 2021-05-04 | Stop reason: HOSPADM

## 2021-05-03 RX ORDER — PREGABALIN 150 MG/1
300 CAPSULE ORAL 2 TIMES DAILY
Status: DISCONTINUED | OUTPATIENT
Start: 2021-05-03 | End: 2021-05-04 | Stop reason: HOSPADM

## 2021-05-03 RX ORDER — CHOLECALCIFEROL (VITAMIN D3) 125 MCG
10 CAPSULE ORAL
Status: DISCONTINUED | OUTPATIENT
Start: 2021-05-03 | End: 2021-05-04 | Stop reason: HOSPADM

## 2021-05-03 RX ORDER — PREGABALIN 300 MG/1
300 CAPSULE ORAL 2 TIMES DAILY
Status: SHIPPED | COMMUNITY
End: 2021-11-16

## 2021-05-03 RX ORDER — FLUOXETINE HYDROCHLORIDE 20 MG/1
40 CAPSULE ORAL DAILY
Refills: 0 | Status: DISCONTINUED | OUTPATIENT
Start: 2021-05-04 | End: 2021-05-04 | Stop reason: HOSPADM

## 2021-05-03 RX ORDER — OXCARBAZEPINE 300 MG/1
300 TABLET, FILM COATED ORAL 2 TIMES DAILY
Status: DISCONTINUED | OUTPATIENT
Start: 2021-05-03 | End: 2021-05-04 | Stop reason: HOSPADM

## 2021-05-03 RX ORDER — MYCOPHENOLATE MOFETIL 250 MG/1
500 CAPSULE ORAL 2 TIMES DAILY
Status: DISCONTINUED | OUTPATIENT
Start: 2021-05-03 | End: 2021-05-04 | Stop reason: HOSPADM

## 2021-05-03 RX ORDER — ALBUTEROL SULFATE 90 UG/1
2 AEROSOL, METERED RESPIRATORY (INHALATION) EVERY 6 HOURS PRN
Status: DISCONTINUED | OUTPATIENT
Start: 2021-05-03 | End: 2021-05-04 | Stop reason: HOSPADM

## 2021-05-03 RX ORDER — LEVOTHYROXINE SODIUM 0.05 MG/1
100 TABLET ORAL
Status: DISCONTINUED | OUTPATIENT
Start: 2021-05-04 | End: 2021-05-04 | Stop reason: HOSPADM

## 2021-05-03 RX ADMIN — OXCARBAZEPINE 300 MG: 300 TABLET, FILM COATED ORAL at 20:29

## 2021-05-03 RX ADMIN — PREGABALIN 300 MG: 150 CAPSULE ORAL at 19:18

## 2021-05-03 RX ADMIN — BUSPIRONE HYDROCHLORIDE 10 MG: 10 TABLET ORAL at 20:29

## 2021-05-03 RX ADMIN — MYCOPHENOLATE MOFETIL 500 MG: 250 CAPSULE ORAL at 20:29

## 2021-05-03 RX ADMIN — Medication 10 MG: at 20:29

## 2021-05-03 RX ADMIN — ZIPRASIDONE HYDROCHLORIDE 40 MG: 40 CAPSULE ORAL at 19:18

## 2021-05-03 RX ADMIN — ALBUTEROL SULFATE 2.5 MG: 2.5 SOLUTION RESPIRATORY (INHALATION) at 12:38

## 2021-05-03 RX ADMIN — BACLOFEN 10 MG: 10 TABLET ORAL at 19:18

## 2021-05-03 ASSESSMENT — COPD QUESTIONNAIRES
DO YOU EVER COUGH UP ANY MUCUS OR PHLEGM?: NO/ONLY WITH OCCASIONAL COLDS OR INFECTIONS
HAVE YOU SMOKED AT LEAST 100 CIGARETTES IN YOUR ENTIRE LIFE: NO/DON'T KNOW

## 2021-05-03 ASSESSMENT — LIFESTYLE VARIABLES
DO YOU DRINK ALCOHOL: NO
DOES PATIENT WANT TO STOP DRINKING: NO
EVER_SMOKED: NEVER

## 2021-05-03 ASSESSMENT — FIBROSIS 4 INDEX: FIB4 SCORE: 0.2

## 2021-05-03 NOTE — ED NOTES
" Med Rec completed: per patient at bedside    Preferred [facility]acy: [facility] P: [phone]    Pt reports taking Lyrica 300mg BID    Pt confirmed following allergies:  Allergies   Allergen Reactions   • Depakote [Divalproex Sodium] Unspecified     Muscle spasms/muscle pain and weakness     • Montelukast [Singulair]      Cardiac effusion   • Amitriptyline Unspecified     Headaches     • Aripiprazole [Abilify] Unspecified     Headaches/muscle twitching     • Clindamycin Nausea     Even with food     • Flagyl [Metronidazole Hcl] Unspecified     \"eye problems\"     • Flomax [Tamsulosin Hydrochloride] Swelling   • Levaquin Unspecified     Severe muscle cramps in legs  RXN=unknown   • Metformin Unspecified     Increased lactic acid      • Tamsulosin Swelling     Swelling of legs   • Tape Rash     Tears skin off  coban with Tegaderm tape ok intermittently  RXN=ongoing   • Vancomycin Itching     Pt becomes flushed in face and chest.   RXN=7/10/16   • Wound Dressing Adhesive Hives     By pt report   • Ampicillin Rash     Pt reports that she received a rash    • Ciprofloxacin Rash         • Keflex Rash     Pt states she gets a rash with this medication  Tolerates ceftriaxone  Can take with Benadryl   • Levofloxacin Unspecified     Leg muscle cramps   • Metronidazole Rash     \"Vision problems\"   • Penicillins Hives     Can take with Benadryl   • Sulfa Drugs Itching and Myalgia     Muscle pain and weakness   • Valproic Acid Rash     .        No ORAL antibiotics in last 14 days          "

## 2021-05-03 NOTE — ED TRIAGE NOTES
"Chief Complaint   Patient presents with   • Asthma     pt reports asthma exacerbation that began at 0300 this AM. pt takes Duoneb and Albuterol at home without any improvements    • Shortness of Breath   • Chest Pain     left upper chest radiating to left arm. pt reports she has a monitor at home that showed she was in afib for approx 1 hour. pt reports a history of similar event    • Suicidal Ideation       Pt ambulatory to triage with steady gait for above complaint. Initially patient was not reporting CP however pt now stating she has left upper chest pain radiating to left arm. Pt also reporting SI, states she has thought about jumping in front of a bus, pt states \"but that is not why I am here.\"     Pt is alert and oriented, speaking in full sentences, follows commands and responds appropriately to questions. Resp are even and unlabored.     Charge RN notified of pt, pt from triage to GRN 36. Report given to RN       /76   Pulse 69   Temp 36.6 °C (97.8 °F) (Temporal)   Resp 16   Ht 1.651 m (5' 5\")   Wt 115 kg (252 lb 10.4 oz)   SpO2 96%     "

## 2021-05-03 NOTE — DISCHARGE PLANNING
Medical Social Work    Referral: Legal Hold    Intervention: Legal Hold Paperwork given to SW by [employer] RN Kandi Wallis    Legal Hold Initiated: Date: 05- Time: 1450    Patient’s Insurance Listed on Face Sheet: Medicare and Medicaid FFS    Referrals sent to: [place], [facility], [facility]    This referral contains the following information:  1) Face sheet __x__  2) Page 1 and Page 2 of Legal Hold __x__  3) Alert Team Assessment/Psych Assessment ___xx_  4) Head to toe physical exam __x__  5) Urine Drug Screen ____  6) Blood Alcohol ___x_  7) Vital signs x____  8) Pregnancy test when applicable __Pending_  9) Medications list __x__    Plan: Patient will transfer to mental health facility once acceptance is obtained
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
## 2021-05-03 NOTE — ED PROVIDER NOTES
ED Provider Note    Scribed for Wesley Joseph M.D. by Henrietta Pat. 5/3/2021, 12:10 PM.    Primary care provider: Torres Brody M.D.  Means of arrival: Walk in  History obtained from: Patient  History limited by: none    CHIEF COMPLAINT  Chief Complaint   Patient presents with   • Asthma     pt reports asthma exacerbation that began at 0300 this AM. pt takes Duoneb and Albuterol at home without any improvements    • Shortness of Breath   • Chest Pain     left upper chest radiating to left arm. pt reports she has a monitor at home that showed she was in afib for approx 1 hour. pt reports a history of similar event    • Suicidal Ideation       HPI  Kristin Balderrama is a 30 y.o. female who presents to the Emergency Department for shortness of breath onset 3 am this morning. Patient has a past medical history of asthma and used her Duoneb and albuterol this morning without alleviation of her symptoms. She endorses having a pulse oximeter at home and her O2 saturation at home was in the 90% range. Patient had associated palpations this morning and she states she felt like she was in a fib. Additionally the patient has recently been depressed and previous had suicidal thoughts. Denies following with a therapist.    REVIEW OF SYSTEMS  As above otherwise all other systems are negative    PAST MEDICAL HISTORY   has a past medical history of Abdominal pain, Anginal syndrome, Apnea, sleep, Arrhythmia, Arthritis, ASTHMA, Atrial fibrillation (HCC), Back pain, Borderline personality disorder (HCC), Breath shortness, Bronchitis, Cardiac arrhythmia, Chickenpox, Chronic UTI (9/18/2010), Cough, Daytime sleepiness, Dental disorder (03/08/2021), Depression, Diabetes (HCC), Diarrhea, Disorder of thyroid, Fall, Fatigue, Frequent headaches, Gasping for breath, Gynecological disorder, Headache(784.0), Heart burn, Heart murmur, History of falling, Indigestion, Migraine, Mitochondrial disease (HCC), Multiple personality disorder  (HCC), Nausea, Obesity, Other fatigue (6/29/2020), Pain (08-15-12), Painful joint, PCOS (polycystic ovarian syndrome), Pneumonia (2012 12/2020), Psychosis ([facility]), Ringing in ears, Scoliosis, Shortness of breath, Sinus tachycardia (10/31/2013), Sleep apnea, Snoring, Tonsillitis, 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HCC), Tuberculosis, Urinary bladder disorder, Urinary incontinence, Weakness, and Wears glasses.    SURGICAL HISTORY   has a past surgical history that includes neuro dest facet l/s w/ig sngl (4/21/2015); recovery (1/27/2016); delmar by laparoscopy (8/29/2010); lumbar fusion anterior (8/21/2012); other cardiac surgery (1/2016); tonsillectomy; bowel stimulator insertion (7/13/2016); gastroscopy with balloon dilatation (N/A, 1/4/2017); muscle biopsy (Right, 1/26/2017); other abdominal surgery; laminotomy; and bowel stimulator insertion (3/10/2021).    SOCIAL HISTORY  Social History     Tobacco Use   • Smoking status: Never Smoker   • Smokeless tobacco: Never Used   Substance Use Topics   • Alcohol use: No     Alcohol/week: 0.0 oz   • Drug use: Not Currently     Frequency: 7.0 times per week     Types: Marijuana      Social History     Substance and Sexual Activity   Drug Use Not Currently   • Frequency: 7.0 times per week   • Types: Marijuana       FAMILY HISTORY  Family History   Problem Relation Age of Onset   • Hypertension Mother    • Sleep Apnea Mother    • Heart Disease Mother    • Other Mother         hypothryod   • Hypertension Maternal Uncle    • Heart Disease Maternal Grandmother    • Hypertension Maternal Grandmother    • No Known Problems Sister    • Other Sister         Narcolepsy;fibromyalsia;bone;nerve   • Genitourinary () Problems Sister         endometriosis       CURRENT MEDICATIONS  Home Medications     Reviewed by Ulisses Chery (Pharmacy Tech) on 05/03/21 at 1548  Med List Status: Complete   Medication Last Dose Status   albuterol 108 (90 Base) MCG/ACT Aero Soln inhalation aerosol 5/3/2021  "Active   aspirin 81 MG EC tablet 5/3/2021 Active   baclofen (LIORESAL) 10 MG Tab 5/3/2021 Active   busPIRone (BUSPAR) 10 MG Tab tablet 5/3/2021 Active   Dulaglutide (TRULICITY) 1.5 MG/0.5ML Solution Pen-injector 4/30/2021 Active   ferrous sulfate 325 (65 Fe) MG tablet 5/3/2021 Active   fluoxetine (PROZAC) 40 MG capsule 5/3/2021 Active   fluticasone-salmeterol (ADVAIR) 250-50 MCG/DOSE AEROSOL POWDER, BREATH ACTIVATED > 1 week Active   ipratropium-albuterol (DUONEB) 0.5-2.5 (3) MG/3ML nebulizer solution 5/3/2021 Active   ipratropium-albuterol (DUONEB) 0.5-2.5 (3) MG/3ML nebulizer solution Not Taking Active   ivabradine (CORLANOR) 7.5 MG Tab tablet 5/3/2021 Active   levothyroxine (SYNTHROID) 100 MCG Tab 5/3/2021 Active   Melatonin 10 MG Tab 5/2/2021 Active   metoprolol SR (TOPROL XL) 25 MG TABLET SR 24 HR 5/2/2021 Active   mycophenolate (CELLCEPT) 500 MG tablet 5/3/2021 Active   OXcarbazepine (TRILEPTAL) 300 MG Tab 5/3/2021 Active   potassium chloride SA (KDUR) 20 MEQ Tab CR 5/3/2021 Active   pregabalin (LYRICA) 150 MG Cap 5/3/2021 Active   ziprasidone (GEODON) 40 MG Cap 5/3/2021 Active                ALLERGIES  Allergies   Allergen Reactions   • Depakote [Divalproex Sodium] Unspecified     Muscle spasms/muscle pain and weakness     • Montelukast [Singulair]      Cardiac effusion   • Amitriptyline Unspecified     Headaches     • Aripiprazole [Abilify] Unspecified     Headaches/muscle twitching     • Clindamycin Nausea     Even with food     • Flagyl [Metronidazole Hcl] Unspecified     \"eye problems\"     • Flomax [Tamsulosin Hydrochloride] Swelling   • Levaquin Unspecified     Severe muscle cramps in legs  RXN=unknown   • Metformin Unspecified     Increased lactic acid      • Tamsulosin Swelling     Swelling of legs   • Tape Rash     Tears skin off  coban with Tegaderm tape ok intermittently  RXN=ongoing   • Vancomycin Itching     Pt becomes flushed in face and chest.   RXN=7/10/16   • Wound Dressing Adhesive Hives     " "By pt report   • Ampicillin Rash     Pt reports that she received a rash    • Ciprofloxacin Rash         • Keflex Rash     Pt states she gets a rash with this medication  Tolerates ceftriaxone  Can take with Benadryl   • Levofloxacin Unspecified     Leg muscle cramps   • Metronidazole Rash     \"Vision problems\"   • Penicillins Hives     Can take with Benadryl   • Sulfa Drugs Itching and Myalgia     Muscle pain and weakness   • Valproic Acid Rash     .       PHYSICAL EXAM  VITAL SIGNS: /76   Pulse 69   Temp 36.6 °C (97.8 °F) (Temporal)   Resp 16   Ht 1.651 m (5' 5\")   Wt 115 kg (252 lb 10.4 oz)   SpO2 96%   BMI 42.04 kg/m²     Constitutional: Appears anxious, No acute distress, Non-toxic appearance.   HENT: Normocephalic, Atraumatic, Bilateral external ears normal,  No oral exudates, Nose normal.   Eyes:conjunctiva is normal, there are no signs of exudate.   Neck: Supple, no meningeal signs.  Cardiovascular: Regular rate and rhythm without murmurs gallops or rubs.   Thorax & Lungs: No respiratory distress. Breathing comfortably. Lungs are clear to auscultation bilaterally, there are no wheezes no rales. Chest wall is nontender.  Abdomen: Soft, nontender, nondistended. Bowel sounds are present.   Skin: Warm, Dry, No erythema,   Back: No tenderness, No CVA tenderness.  Musculoskeletal: Good range of motion in all major joints. No tenderness to palpation or major deformities noted. Intact distal pulses, no clubbing, no cyanosis, no edema, negative clarence's signs  Neurologic: Alert & oriented x 3, Moving all extremities. No gross abnormalities.    Psychiatric: Appears anxious, has been depressed, is not currently having suicidal thought.     LABS  Results for orders placed or performed during the hospital encounter of 05/03/21   URINE DRUG SCREEN   Result Value Ref Range    Amphetamines Urine Negative Negative    Barbiturates Negative Negative    Benzodiazepines Negative Negative    Cocaine Metabolite " Negative Negative    Methadone Negative Negative    Opiates Negative Negative    Oxycodone Negative Negative    Phencyclidine -Pcp Negative Negative    Propoxyphene Negative Negative    Cannabinoid Metab Negative Negative   POC BREATHALIZER   Result Value Ref Range    POC Breathalizer 0.00 0.00 - 0.01 Percent   EKG   Result Value Ref Range    Report       [facility] Emergency Dept.    Test Date:  2021  Pt Name:    HARLEY DE LA CRUZ              Department: ER  MRN:        [id number]                      Room:       [facility]  Gender:     Female                       Technician: SHANKAR  :        1990                   Requested By:SARWAT ALBERTO  Order #:    [id number]                    Reading MD:    Measurements  Intervals                                Axis  Rate:       77                           P:          16  OK:         144                          QRS:        19  QRSD:       88                           T:          7  QT:         424  QTc:        480    Interpretive Statements  SINUS RHYTHM  BORDERLINE T ABNORMALITIES, ANTERIOR LEADS  BORDERLINE PROLONGED QT INTERVAL  Compared to ECG 2021 06:34:57  T-wave abnormality now present       *Note: Due to a large number of results and/or encounters for the requested time period, some results have not been displayed. A complete set of results can be found in Results Review.      All labs reviewed by me.    EKG  Interpreted by me as above.    RADIOLOGY  DX-CHEST-PORTABLE (1 VIEW)   Final Result      1.  Minimal opacity in the left lower lobe which may represent minimal linear atelectasis or focal pleural thickening.      2.  Otherwise clear chest.        The radiologist's interpretation of all radiological studies have been reviewed by me.    COURSE & MEDICAL DECISION MAKING  Pertinent Labs & Imaging studies reviewed. (See chart for details)    12:10 PM - Patient seen and examined at bedside. Patients oxygen saturation is at 97% and  her heart rate is normal. I discussed that we will obtain imaging and give her a breathing treatment. Additionally she will be evaluated by our behavioral health team. Ordered chest x-ray, urine drug screen, POC breathalyzer, and EKG to evaluate her symptoms. She will be treated with albuterol 2.5 mg.     2:39 PM - Patient was reevaluated. Alert team is at bedside.      2:45 PM -  Patient is no longer feeling short of breath and her oxygen saturation is stable. Spoke with alert team who states the patient will need to be placed on a legal hold. Completed legal paperwork.    Decision Making:  She presents emerge department for evaluation.  The patient was not hypoxemic upon initial evaluation she was hyperventilating appear to be very anxious and her oxygen saturations were 97% on room air.  I did give her a breathing treatment she sounds the same after the breathing treatment and after we calmed her down she was much more appropriate.  The patient did relate that she was feeling suicidal so I did have life skills evaluate the patient and at this point they feel the patient is at risk and will need further psychiatric treatment and patient was placed on a legal hold.  At this point from a medical standpoint she does have significant past medical history of multiple problems however currently these are not her issues.  I have recommended that she continue with her outpatient medication regiment that her episode today of shortness of breath was related to anxiety.  At this point I feel the patient is stable for transfer to an appropriate psychiatric facility.  All    Disposition  Patient will be transferred to a psychiatric facility    FINAL IMPRESSION  1. Anxiety    2. Hyperventilating    3. Suicidal ideation    4. Depression, unspecified depression type          I, Henrietta Espinosa), am scribing for, and in the presence of, Wesley Joseph M.D..    Electronically signed by: Henrietta Espinosa), 5/3/2021    I  Wesley Joseph M.D. personally performed the services described in this documentation, as scribed by Henrietta Pat in my presence, and it is both accurate and complete.  C    The note accurately reflects work and decisions made by me.  Wesley Joseph M.D.  5/3/2021  4:23 PM

## 2021-05-03 NOTE — DISCHARGE PLANNING
SW received copy of Pt Legal Hold from Alert Team SONYA Wallis.     KRYSTIAN will send mental health referral once H&P has been placed in Pt chart.     KRYSTIAN will monitor.

## 2021-05-04 NOTE — DISCHARGE PLANNING
KRYSTIAN faxed COVID results to [facility].   KRYSTIAN spoke to Halina who will wait for fax to review results and she will call KRYSTIAN with an ETA.

## 2021-05-04 NOTE — DISCHARGE PLANNING
Halina from [facility] called and they can accept Pt at 2200.    [facility]  transportation authorization arranged with edwin TAO completed and faxed to [facility]  Transportation time arranged for 2200 per [facility] request.     Transfer Packet completed with Original Legal Hold placed inside.   RN updated on transfer and transfer time.     Halina at [facility] aware of 2200 [facility] transportation time.

## 2021-05-04 NOTE — DISCHARGE PLANNING
Fly from [facility] called and they will accept Pt.   Dr. Harrison will be admitting.     Pt will need COVID test.   SW has requested COVID test be completed. Once resulted SW will arrange transportation.

## 2021-05-04 NOTE — DISCHARGE PLANNING
Tatyana from [facility] states they are declining Pt due to Medical Complexity and she is to high acuity.     Pt waiting for transfer to Behavioral Health Facility.

## 2021-05-04 NOTE — ED NOTES
Discharge teaching and paperwork provided. All questions/concerns answered. VSS, assessment stable. Patient discharged to the care of [facility] and ambulated out of the ED with steady gait.

## 2021-05-11 ENCOUNTER — HOSPITAL ENCOUNTER (EMERGENCY)
Facility: MEDICAL CENTER | Age: 31
End: 2021-05-11
Attending: EMERGENCY MEDICINE
Payer: MEDICARE

## 2021-05-11 ENCOUNTER — PATIENT OUTREACH (OUTPATIENT)
Dept: HEALTH INFORMATION MANAGEMENT | Facility: OTHER | Age: 31
End: 2021-05-11

## 2021-05-11 VITALS
WEIGHT: 242.51 LBS | SYSTOLIC BLOOD PRESSURE: 102 MMHG | DIASTOLIC BLOOD PRESSURE: 56 MMHG | HEIGHT: 65 IN | TEMPERATURE: 97.8 F | OXYGEN SATURATION: 96 % | BODY MASS INDEX: 40.4 KG/M2 | HEART RATE: 74 BPM | RESPIRATION RATE: 18 BRPM
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
## 2021-05-11 DIAGNOSIS — R07.89 CHEST WALL PAIN: ICD-10-CM

## 2021-05-11 LAB — EKG IMPRESSION: NORMAL

## 2021-05-11 PROCEDURE — 93005 ELECTROCARDIOGRAM TRACING: CPT | Performed by: EMERGENCY MEDICINE

## 2021-05-11 PROCEDURE — 99283 EMERGENCY DEPT VISIT LOW MDM: CPT

## 2021-05-11 PROCEDURE — 93005 ELECTROCARDIOGRAM TRACING: CPT

## 2021-05-11 ASSESSMENT — FIBROSIS 4 INDEX: FIB4 SCORE: 0.2

## 2021-05-11 NOTE — ED TRIAGE NOTES
"Pt to triage with c/o   Chief Complaint   Patient presents with   • Chest Pain     mid to left chest in to left side of back; reports at h Echo completed at [facility] and they told her that she has a \"natural shunt\"  decreased EF.     • T-5000 FALL     d/t chest pain; reports she hit head on the back of her head; unk LOC   • Dizziness     after the fall, \"feels foggy\"   • T-5000 Head Injury   • Nausea     no vomiting, reports dry throat.       Pt is on ASA 81 mg daily and reports taking 5 ASA after fall for her chest pain.  Pt reports pain 10/10.  Pt BIB EMS to triage.  Reports fall 3 hours ago, recently at [facility] for SI for a wk.  Pt denies SI/HI.  .  Charge RN informed of pt.  Pt Informed regarding triage process and verbalized understanding to inform triage tech or RN for any changes in condition. Placed in lobby.    "

## 2021-05-11 NOTE — ED PROVIDER NOTES
"ED Provider Note    CHIEF COMPLAINT  Chief Complaint   Patient presents with   • Chest Pain     mid to left chest in to left side of back; reports at h Echo completed at [facility] and they told her that she has a \"natural shunt\"  decreased EF.     • T-5000 FALL     d/t chest pain; reports she hit head on the back of her head; unk LOC   • Dizziness     after the fall, \"feels foggy\"   • T-5000 Head Injury   • Nausea     no vomiting, reports dry throat.         HPI  Kristin Balderrama is a 30 y.o. female who presents emergency department after a fall.  The patient says that she tripped this morning falling forward.  She has some pain of her anterior chest wall.  Did not hit her head.  She did not get knocked out.  She says that she feels a bit foggy.    REVIEW OF SYSTEMS  See HPI for further details. All other systems are negative.     PAST MEDICAL HISTORY  Past Medical History:   Diagnosis Date   • Abdominal pain    • Anginal syndrome     random chest pain especially with tachycardia   • Apnea, sleep    • Arrhythmia     \"sinus tachycardia\", cariologist, Dr. Kumar; ablation 2/2016   • Arthritis     osteo   • ASTHMA     when around smoke   • Atrial fibrillation (HCC)    • Back pain    • Borderline personality disorder (HCC)    • Breath shortness     with tachycardia   • Bronchitis    • Cardiac arrhythmia    • Chickenpox    • Chronic UTI 9/18/2010   • Cough    • Daytime sleepiness    • Dental disorder 03/08/2021    infected tooth   • Depression    • Diabetes (HCC)    • Diarrhea     incontinece   • Disorder of thyroid    • Fall    • Fatigue    • Frequent headaches    • Gasping for breath    • Gynecological disorder     PCOS   • Headache(784.0)    • Heart burn    • Heart murmur    • History of falling    • Indigestion    • Migraine    • Mitochondrial disease (HCC)    • 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HCC)    • Nausea    • Obesity    • Other fatigue 6/29/2020   • Pain 08-15-12    back, 7/10   • Painful joint    • PCOS " "(polycystic ovarian syndrome)    • Pneumonia 2012 12/2020   • Psychosis (HCC)    • Ringing in ears    • Scoliosis    • Shortness of breath     O2 as needed   • Sinus tachycardia 10/31/2013   • Sleep apnea     CPAP \"pulmonary doctor took me off mid year 2016\"   • Snoring    • Tonsillitis    • 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HCC)    • Tuberculosis     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   • Urinary bladder disorder     Suprapubic cath   • Urinary incontinence    • Weakness    • Wears glasses        FAMILY HISTORY  Family History   Problem Relation Age of Onset   • Hypertension Mother    • Sleep Apnea Mother    • Heart Disease Mother    • Other Mother         hypothryod   • Hypertension Maternal Uncle    • Heart Disease Maternal Grandmother    • Hypertension Maternal Grandmother    • No Known Problems Sister    • Other Sister         Narcolepsy;fibromyalsia;bone;nerve   • Genitourinary () Problems Sister         endometriosis       SOCIAL HISTORY  Social History     Socioeconomic History   • Marital status: Single     Spouse name: Not on file   • Number of children: Not on file   • Years of education: Not on file   • Highest education level: Not on file   Occupational History   • Not on file   Tobacco Use   • Smoking status: Never Smoker   • Smokeless tobacco: Never Used   Substance and Sexual Activity   • Alcohol use: No     Alcohol/week: 0.0 oz   • Drug use: Not Currently     Frequency: 7.0 times per week     Types: Marijuana   • Sexual activity: Not Currently     Birth control/protection: Pill   Other Topics Concern   • Not on file   Social History Narrative    ** Merged History Encounter **          Social Determinants of Health     Financial Resource Strain: Medium Risk   • Difficulty of Paying Living Expenses: Somewhat hard   Food Insecurity: Food Insecurity Present   • Worried About Running Out of Food in the Last Year: Sometimes true   • Ran Out of Food in the Last Year: Sometimes true   Transportation Needs: " Unmet Transportation Needs   • Lack of Transportation (Medical): No   • Lack of Transportation (Non-Medical): Yes   Physical Activity:    • Days of Exercise per Week:    • Minutes of Exercise per Session:    Stress:    • Feeling of Stress :    Social Connections:    • Frequency of Communication with Friends and Family:    • Frequency of Social Gatherings with Friends and Family:    • Attends Taoist Services:    • Active Member of Clubs or Organizations:    • Attends Club or Organization Meetings:    • Marital Status:    Intimate Partner Violence:    • Fear of Current or Ex-Partner:    • Emotionally Abused:    • Physically Abused:    • Sexually Abused:        SURGICAL HISTORY  Past Surgical History:   Procedure Laterality Date   • BOWEL STIMULATOR INSERTION  3/10/2021    Procedure: INSERTION, ELECTRODE LEADS AND PULSE GENERATOR, NEUROSTIMULATOR, SACRAL - REMOVAL OF INTERSTIM WITH REPLACEMENT OF SACRAL NEUROMODULATION DEVICE;  Surgeon: Joe Noyola M.D.;  Location: [facility];  Service: General   • MUSCLE BIOPSY Right 1/26/2017    Procedure: MUSCLE BIOPSY - THIGH;  Surgeon: Isidro Vigil M.D.;  Location: [facility];  Service:    • GASTROSCOPY WITH BALLOON DILATATION N/A 1/4/2017    Procedure: GASTROSCOPY WITH DILATATION;  Surgeon: Torers Vargas M.D.;  Location: [facility];  Service:    • BOWEL STIMULATOR INSERTION  7/13/2016    Procedure: BOWEL STIMULATOR INSERTION FOR PERMANENT INTERSTIM SACRAL IMPLANT;  Surgeon: Joe Noyola M.D.;  Location: [facility];  Service:    • RECOVERY  1/27/2016    Procedure: CATH LAB EP STUDY WITH SINUS NODE MODIFICATION ABHINAV;  Surgeon: Recoveryonly Surgery;  Location: SURGERY PRE-POST PROC UNIT [facility];  Service:    • OTHER CARDIAC SURGERY  1/2016    cardiac ablation   • NEURO DEST FACET L/S W/IG SNGL  4/21/2015    Performed by Reza Tabor at SURGERY [facility]   • LUMBAR FUSION ANTERIOR  8/21/2012    Performed by SUSIE SAWANT  "at SURGERY [facility]   • ALYSSA BY LAPAROSCOPY  8/29/2010    Performed by SHAYY JOHNS at SURGERY [facility]   • LAMINOTOMY     • OTHER ABDOMINAL SURGERY     • TONSILLECTOMY      tonsillectomy       CURRENT MEDICATIONS  Home Medications    **Home medications have not yet been reviewed for this encounter**         ALLERGIES  Allergies   Allergen Reactions   • Depakote [Divalproex Sodium] Unspecified     Muscle spasms/muscle pain and weakness     • Montelukast [Singulair]      Cardiac effusion   • Amitriptyline Unspecified     Headaches     • Aripiprazole [Abilify] Unspecified     Headaches/muscle twitching     • Clindamycin Nausea     Even with food     • Flagyl [Metronidazole Hcl] Unspecified     \"eye problems\"     • Flomax [Tamsulosin Hydrochloride] Swelling   • Levaquin Unspecified     Severe muscle cramps in legs  RXN=unknown   • Metformin Unspecified     Increased lactic acid      • Tamsulosin Swelling     Swelling of legs   • Tape Rash     Tears skin off  coban with Tegaderm tape ok intermittently  RXN=ongoing   • Vancomycin Itching     Pt becomes flushed in face and chest.   RXN=7/10/16   • Wound Dressing Adhesive Hives     By pt report   • Ampicillin Rash     Pt reports that she received a rash    • Ciprofloxacin Rash         • Keflex Rash     Pt states she gets a rash with this medication  Tolerates ceftriaxone  Can take with Benadryl   • Levofloxacin Unspecified     Leg muscle cramps   • Metronidazole Rash     \"Vision problems\"   • Penicillins Hives     Can take with Benadryl   • Sulfa Drugs Itching and Myalgia     Muscle pain and weakness   • Valproic Acid Rash     .       PHYSICAL EXAM  VITAL SIGNS: /56   Pulse 74   Temp 36.6 °C (97.8 °F) (Temporal)   Resp 18   Ht 1.651 m (5' 5\")   Wt 110 kg (242 lb 8.1 oz)   SpO2 96%   BMI 40.36 kg/m²    Constitutional: Well developed, Well nourished, No acute distress, Non-toxic appearance.   HENT: Normocephalic, Atraumatic, Bilateral " external ears normal, Oropharynx moist, No oral exudates, Nose normal.   Eyes: PERRLA, EOMI, Conjunctiva normal, No discharge.   Neck: Normal range of motion, No tenderness, Supple, No stridor.   Cardiovascular: Regular rate and rhythm, no audible murmur  Thorax & Lungs: Mild tenderness to palpation over the sternum and sternal costal joints.  No crepitus.  Symmetric breath sounds.  No increased work of breathing.  Abdomen: Bowel sounds normal, Soft, No tenderness, No masses, No pulsatile masses.   Skin: Warm, Dry, No erythema, No rash.   Back: No tenderness, No CVA tenderness.   Extremities: Intact distal pulses, No tenderness, No cyanosis, No clubbing.  Atraumatic.  Neurologic: Alert & oriented x 3, Normal motor function, Normal sensory function, No focal deficits noted.     EKG  Results for orders placed or performed during the hospital encounter of 21   EKG (NOW)   Result Value Ref Range    Report       [facility] Emergency Dept.    Test Date:  2021  Pt Name:    HARLEY DE LA CRUZ              Department: ER  MRN:        [id number]                      Room:  Gender:     Female                       Technician: [id number]  :        [id number]                   Requested By:ER TRIAGE PROTOCOL  Order #:    [id number]                    Reading MD: HEIDY LOPEZ MD    Measurements  Intervals                                Axis  Rate:       76                           P:          41  MI:         152                          QRS:        12  QRSD:       96                           T:          13  QT:         424  QTc:        477    Interpretive Statements  SINUS RHYTHM  BORDERLINE T ABNORMALITIES, ANTERIOR LEADS  BORDERLINE PROLONGED QT INTERVAL  Compared to ECG 2021 15:24:00  No significant changes  Electronically Signed On 2021 9:05:48 PDT by HEIDY LOPEZ MD       *Note: Due to a large number of results and/or encounters for the requested time period, some results have not  been displayed. A complete set of results can be found in Results Review.         RADIOLOGY/PROCEDURES      COURSE & MEDICAL DECISION MAKING  Pertinent Labs & Imaging studies reviewed. (See chart for details)    The patient returns to the emergency department with some chest wall pain.  She apparently had a mechanical ground-level fall.  There are no concerning features with this.  She has a normal pulmonary exam.  Normal pulse oximetry.  Normal vital signs.  The patient is very frequently in the emergency department.  I question whether or not there is some psychosomatic symptoms contributing to the patient's presentation and frequent presentation to the emergency department.  At this time there are no objective findings of significant acute medical condition.  I do not feel that further evaluation is warranted.  Patient is discharged in stable condition.    The patient will return for new or worsening symptoms and is stable at the time of discharge.    The patient is referred to a primary physician for blood pressure management, diabetic screening, and for all other preventative health concerns.    DISPOSITION:  Patient will be discharged home in stable condition.    FOLLOW UP:  Torres Brody M.D.  [address] [facility] [id number] [phone]    Schedule an appointment as soon as possible for a visit       [facility], Emergency Dept  [address] [facility] [id number] [phone]    If symptoms worsen      OUTPATIENT MEDICATIONS:  New Prescriptions    No medications on file       FINAL IMPRESSION  1. Chest wall pain    2. Fall, initial encounter              Electronically signed by: Josafat Leung M.D., 5/11/2021 9:06 AM

## 2021-05-11 NOTE — ED NOTES
Pt to rm 3 from triage.  Pt states she was Dc'd from [facility] yesterday.  States chest pain upon discharge.

## 2021-05-11 NOTE — PROGRESS NOTES
Patient returned CHW's call and asked about the resources mentioned. Patient sounded groggy and sleepy on the phone. CHW Louis  introduced herself and [facility] program to patient. Patient said she has used [facility] in the past and has worked with CHW Ty in the past. CHW brought up patient's frequent ER use and if there are barriers that continue to bring patient to ER. Patient did not have an

## 2021-05-11 NOTE — PROGRESS NOTES
Received incoming order from [facility] to follow up with patient as she is a high ED utilizer. CHW has looked through patient's chart and has had >25 ER visits this year.   CHW called patient to see what resources she could need. Patient did not answer CHW's call. CHW left a VM for patient with information on [facility], [facility], and [facility]. CHW Louis left this worker's contact information in case further assistance is need.     CHW will call patient a second time.     [facility] email and they'll   Food delivery 5/14

## 2021-05-12 ENCOUNTER — HOSPITAL ENCOUNTER (OUTPATIENT)
Facility: MEDICAL CENTER | Age: 31
End: 2021-05-12
Attending: NURSE PRACTITIONER
Payer: MEDICARE

## 2021-05-12 PROCEDURE — 87086 URINE CULTURE/COLONY COUNT: CPT

## 2021-05-13 ENCOUNTER — APPOINTMENT (OUTPATIENT)
Dept: RADIOLOGY | Facility: MEDICAL CENTER | Age: 31
End: 2021-05-13
Attending: EMERGENCY MEDICINE
Payer: MEDICARE

## 2021-05-13 ENCOUNTER — HOSPITAL ENCOUNTER (EMERGENCY)
Facility: MEDICAL CENTER | Age: 31
End: 2021-05-13
Attending: EMERGENCY MEDICINE
Payer: MEDICARE

## 2021-05-13 VITALS
WEIGHT: 242 LBS | DIASTOLIC BLOOD PRESSURE: 59 MMHG | RESPIRATION RATE: 16 BRPM | HEART RATE: 74 BPM | BODY MASS INDEX: 40.32 KG/M2 | SYSTOLIC BLOOD PRESSURE: 105 MMHG | HEIGHT: 65 IN | TEMPERATURE: 98 F | OXYGEN SATURATION: 94 %

## 2021-05-13 DIAGNOSIS — S70.01XA CONTUSION OF RIGHT HIP, INITIAL ENCOUNTER: ICD-10-CM
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
## 2021-05-13 LAB
ALBUMIN SERPL BCP-MCNC: 3.9 G/DL (ref 3.2–4.9)
ALBUMIN/GLOB SERPL: 2.2 G/DL
ALP SERPL-CCNC: 88 U/L (ref 30–99)
ALT SERPL-CCNC: 16 U/L (ref 2–50)
ANION GAP SERPL CALC-SCNC: 10 MMOL/L (ref 7–16)
APPEARANCE UR: CLEAR
AST SERPL-CCNC: 13 U/L (ref 12–45)
BACTERIA #/AREA URNS HPF: NEGATIVE /HPF
BASOPHILS # BLD AUTO: 0.4 % (ref 0–1.8)
BASOPHILS # BLD: 0.02 K/UL (ref 0–0.12)
BILIRUB SERPL-MCNC: 0.2 MG/DL (ref 0.1–1.5)
BILIRUB UR QL STRIP.AUTO: ABNORMAL
BUN SERPL-MCNC: 13 MG/DL (ref 8–22)
CALCIUM SERPL-MCNC: 8.9 MG/DL (ref 8.5–10.5)
CHLORIDE SERPL-SCNC: 118 MMOL/L (ref 96–112)
CO2 SERPL-SCNC: 17 MMOL/L (ref 20–33)
COLOR UR: ABNORMAL
CREAT SERPL-MCNC: 0.73 MG/DL (ref 0.5–1.4)
EOSINOPHIL # BLD AUTO: 0.12 K/UL (ref 0–0.51)
EOSINOPHIL NFR BLD: 2.4 % (ref 0–6.9)
EPI CELLS #/AREA URNS HPF: ABNORMAL /HPF
ERYTHROCYTE [DISTWIDTH] IN BLOOD BY AUTOMATED COUNT: 48.9 FL (ref 35.9–50)
GLOBULIN SER CALC-MCNC: 1.8 G/DL (ref 1.9–3.5)
GLUCOSE SERPL-MCNC: 104 MG/DL (ref 65–99)
GLUCOSE UR STRIP.AUTO-MCNC: NEGATIVE MG/DL
HCG SERPL QL: NEGATIVE
HCT VFR BLD AUTO: 35.7 % (ref 37–47)
HGB BLD-MCNC: 11.7 G/DL (ref 12–16)
HYALINE CASTS #/AREA URNS LPF: ABNORMAL /LPF
IMM GRANULOCYTES # BLD AUTO: 0.03 K/UL (ref 0–0.11)
IMM GRANULOCYTES NFR BLD AUTO: 0.6 % (ref 0–0.9)
KETONES UR STRIP.AUTO-MCNC: NEGATIVE MG/DL
LEUKOCYTE ESTERASE UR QL STRIP.AUTO: ABNORMAL
LYMPHOCYTES # BLD AUTO: 1.27 K/UL (ref 1–4.8)
LYMPHOCYTES NFR BLD: 25.7 % (ref 22–41)
MCH RBC QN AUTO: 29.5 PG (ref 27–33)
MCHC RBC AUTO-ENTMCNC: 32.8 G/DL (ref 33.6–35)
MCV RBC AUTO: 89.9 FL (ref 81.4–97.8)
MICRO URNS: ABNORMAL
MONOCYTES # BLD AUTO: 0.45 K/UL (ref 0–0.85)
MONOCYTES NFR BLD AUTO: 9.1 % (ref 0–13.4)
NEUTROPHILS # BLD AUTO: 3.05 K/UL (ref 2–7.15)
NEUTROPHILS NFR BLD: 61.8 % (ref 44–72)
NITRITE UR QL STRIP.AUTO: POSITIVE
NRBC # BLD AUTO: 0 K/UL
NRBC BLD-RTO: 0 /100 WBC
PH UR STRIP.AUTO: 6 [PH] (ref 5–8)
PLATELET # BLD AUTO: 191 K/UL (ref 164–446)
PMV BLD AUTO: 12.1 FL (ref 9–12.9)
POTASSIUM SERPL-SCNC: 3.5 MMOL/L (ref 3.6–5.5)
PROT SERPL-MCNC: 5.7 G/DL (ref 6–8.2)
PROT UR QL STRIP: NEGATIVE MG/DL
RBC # BLD AUTO: 3.97 M/UL (ref 4.2–5.4)
RBC # URNS HPF: ABNORMAL /HPF
RBC UR QL AUTO: NEGATIVE
SODIUM SERPL-SCNC: 145 MMOL/L (ref 135–145)
SP GR UR STRIP.AUTO: 1.03
UROBILINOGEN UR STRIP.AUTO-MCNC: 1 MG/DL
WBC # BLD AUTO: 4.9 K/UL (ref 4.8–10.8)
WBC #/AREA URNS HPF: ABNORMAL /HPF

## 2021-05-13 PROCEDURE — 96374 THER/PROPH/DIAG INJ IV PUSH: CPT | Mod: XU

## 2021-05-13 PROCEDURE — 80053 COMPREHEN METABOLIC PANEL: CPT

## 2021-05-13 PROCEDURE — 85025 COMPLETE CBC W/AUTO DIFF WBC: CPT

## 2021-05-13 PROCEDURE — 700117 HCHG RX CONTRAST REV CODE 255: Performed by: EMERGENCY MEDICINE

## 2021-05-13 PROCEDURE — 73501 X-RAY EXAM HIP UNI 1 VIEW: CPT | Mod: RT

## 2021-05-13 PROCEDURE — 84703 CHORIONIC GONADOTROPIN ASSAY: CPT

## 2021-05-13 PROCEDURE — 96376 TX/PRO/DX INJ SAME DRUG ADON: CPT

## 2021-05-13 PROCEDURE — 74177 CT ABD & PELVIS W/CONTRAST: CPT | Mod: MG

## 2021-05-13 PROCEDURE — 99284 EMERGENCY DEPT VISIT MOD MDM: CPT

## 2021-05-13 PROCEDURE — 700111 HCHG RX REV CODE 636 W/ 250 OVERRIDE (IP): Performed by: EMERGENCY MEDICINE

## 2021-05-13 PROCEDURE — 81001 URINALYSIS AUTO W/SCOPE: CPT

## 2021-05-13 RX ORDER — MORPHINE SULFATE 4 MG/ML
4 INJECTION, SOLUTION INTRAMUSCULAR; INTRAVENOUS ONCE
Status: COMPLETED | OUTPATIENT
Start: 2021-05-13 | End: 2021-05-13

## 2021-05-13 RX ADMIN — MORPHINE SULFATE 4 MG: 4 INJECTION INTRAVENOUS at 04:10

## 2021-05-13 RX ADMIN — MORPHINE SULFATE 4 MG: 4 INJECTION INTRAVENOUS at 01:55

## 2021-05-13 RX ADMIN — IOHEXOL 100 ML: 350 INJECTION, SOLUTION INTRAVENOUS at 03:29

## 2021-05-13 ASSESSMENT — PAIN DESCRIPTION - PAIN TYPE: TYPE: ACUTE PAIN

## 2021-05-13 ASSESSMENT — ENCOUNTER SYMPTOMS
ROS GI COMMENTS: POSITIVE FOR GROIN PAIN
FALLS: 1
ABDOMINAL PAIN: 1

## 2021-05-13 ASSESSMENT — FIBROSIS 4 INDEX: FIB4 SCORE: 0.2

## 2021-05-13 NOTE — ED TRIAGE NOTES
Chief Complaint   Patient presents with   • GLF     pt states she was sleeping when she fell out of bed. Pt states she has 10/10 right hip, groin and abd pain.    • Hip Pain   • Groin Pain   • Abdominal Pain     Pt BIBA for above complaint. Pt received 100mcg fentanyl IM in route, pt states pain is still 10/10. Pt in gown and on monitor. ERP to see.

## 2021-05-13 NOTE — ED PROVIDER NOTES
ED Provider Note    Scribed for Ignacia Coon M.D. by Taiwo Zuñiga. 5/13/2021, 1:13 AM.    Primary care provider: Torres Brody M.D.  Means of arrival: EMS  History obtained from: Patient  History limited by: None    CHIEF COMPLAINT  Chief Complaint   Patient presents with   • GLF     pt states she was sleeping when she fell out of bed. Pt states she has 10/10 right hip, groin and abd pain.    • Hip Pain   • Groin Pain   • Abdominal Pain       HPI  Kristin Balderrama is a 30 y.o. female who presents to the Emergency Department via EMS for evaluation following a fall that occurred prior to arrival.  Patient reports that she rolled out of bed accidentally when she was asleep landing on her right hip and abdomen.  The fall was about 4 feet, and she has since been having severe right hip, groin, and abdominal pain.  The pain is sharp and 10 out of 10 in severity.  She has fallen in the past but reports that she has never had pain like this. She did not hit her head. EMS treated her with 100 mcg fentanyl en route which provided minimal alleviation.  Denies hitting her head or loss of consciousness.    REVIEW OF SYSTEMS  Review of Systems   Gastrointestinal: Positive for abdominal pain.        Positive for groin pain   Musculoskeletal: Positive for falls.        Positive for right hip pain   Neurological:        Negative for head injury   All other systems reviewed and are negative.      PAST MEDICAL HISTORY   has a past medical history of Abdominal pain, Anginal syndrome, Apnea, sleep, Arrhythmia, Arthritis, ASTHMA, Atrial fibrillation (HCC), Back pain, Borderline personality disorder ([facility]), Breath shortness, Bronchitis, Cardiac arrhythmia, Chickenpox, Chronic UTI (9/18/2010), Cough, Daytime sleepiness, Dental disorder (03/08/2021), Depression, Diabetes (HCC), Diarrhea, Disorder of thyroid, Fall, Fatigue, Frequent headaches, Gasping for breath, Gynecological disorder, Headache(784.0), Heart burn, Heart murmur,  History of falling, Indigestion, Migraine, 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    SURGICAL HISTORY   has a past surgical history that includes neuro dest facet l/s w/ig sngl (4/21/2015); recovery (1/27/2016); delmar by laparoscopy (8/29/2010); lumbar fusion anterior (8/21/2012); other cardiac surgery (1/2016); tonsillectomy; bowel stimulator insertion (7/13/2016); gastroscopy with balloon dilatation (N/A, 1/4/2017); muscle biopsy (Right, 1/26/2017); other abdominal surgery; laminotomy; and bowel stimulator insertion (3/10/2021).    SOCIAL HISTORY  Social History     Tobacco Use   • Smoking status: Never Smoker   • Smokeless tobacco: Never Used   Vaping Use   • Vaping Use: Never used   Substance Use Topics   • Alcohol use: No     Alcohol/week: 0.0 oz   • Drug use: Not Currently     Frequency: 7.0 times per week     Types: Marijuana      Social History     Substance and Sexual Activity   Drug Use Not Currently   • Frequency: 7.0 times per week   • Types: Marijuana       FAMILY HISTORY  Family History   Problem Relation Age of Onset   • Hypertension Mother    • Sleep Apnea Mother    • Heart Disease Mother    • Other Mother         hypothryod   • Hypertension Maternal Uncle    • Heart Disease Maternal Grandmother    • Hypertension Maternal Grandmother    • No Known Problems Sister    • Other Sister         Narcolepsy;fibromyalsia;bone;nerve   • Genitourinary () Problems Sister         endometriosis       CURRENT MEDICATIONS  Home Medications     Reviewed by Ruth Ann Moore R.N. (Registered Nurse) on  "05/13/21 at 0105  Med List Status: Partial   Medication Last Dose Status   albuterol 108 (90 Base) MCG/ACT Aero Soln inhalation aerosol  Active   aspirin 81 MG EC tablet  Active   baclofen (LIORESAL) 10 MG Tab  Active   busPIRone (BUSPAR) 10 MG Tab tablet  Active   Dulaglutide (TRULICITY) 1.5 MG/0.5ML Solution Pen-injector  Active   ferrous sulfate 325 (65 Fe) MG tablet  Active   fluoxetine (PROZAC) 40 MG capsule  Active   fluticasone-salmeterol (ADVAIR) 250-50 MCG/DOSE AEROSOL POWDER, BREATH ACTIVATED  Active   ipratropium-albuterol (DUONEB) 0.5-2.5 (3) MG/3ML nebulizer solution  Active   ipratropium-albuterol (DUONEB) 0.5-2.5 (3) MG/3ML nebulizer solution  Active   ivabradine (CORLANOR) 7.5 MG Tab tablet  Active   levothyroxine (SYNTHROID) 100 MCG Tab  Active   Melatonin 10 MG Tab  Active   metoprolol SR (TOPROL XL) 25 MG TABLET SR 24 HR  Active   mycophenolate (CELLCEPT) 500 MG tablet  Active   OXcarbazepine (TRILEPTAL) 300 MG Tab  Active   potassium chloride SA (KDUR) 20 MEQ Tab CR  Active   pregabalin (LYRICA) 150 MG Cap  Active   ziprasidone (GEODON) 40 MG Cap  Active                ALLERGIES  Allergies   Allergen Reactions   • Depakote [Divalproex Sodium] Unspecified     Muscle spasms/muscle pain and weakness     • Montelukast [Singulair]      Cardiac effusion   • Amitriptyline Unspecified     Headaches     • Aripiprazole [Abilify] Unspecified     Headaches/muscle twitching     • Clindamycin Nausea     Even with food     • Flagyl [Metronidazole Hcl] Unspecified     \"eye problems\"     • Flomax [Tamsulosin Hydrochloride] Swelling   • Levaquin Unspecified     Severe muscle cramps in legs  RXN=unknown   • Metformin Unspecified     Increased lactic acid      • Tamsulosin Swelling     Swelling of legs   • Tape Rash     Tears skin off  coban with Tegaderm tape ok intermittently  RXN=ongoing   • Vancomycin Itching     Pt becomes flushed in face and chest.   RXN=7/10/16   • Wound Dressing Adhesive Hives     By pt " "report   • Ampicillin Rash     Pt reports that she received a rash    • Ciprofloxacin Rash         • Keflex Rash     Pt states she gets a rash with this medication  Tolerates ceftriaxone  Can take with Benadryl   • Levofloxacin Unspecified     Leg muscle cramps   • Metronidazole Rash     \"Vision problems\"   • Penicillins Hives     Can take with Benadryl   • Sulfa Drugs Itching and Myalgia     Muscle pain and weakness   • Valproic Acid Rash     .       PHYSICAL EXAM  VITAL SIGNS: /58   Pulse 83   Temp 36.6 °C (97.8 °F) (Temporal)   Ht 1.651 m (5' 5\")   Wt 110 kg (242 lb)   SpO2 96%   BMI 40.27 kg/m²   Vitals reviewed by myself.  Nursing note and vitals reviewed.  Constitutional: Well-developed and well-nourished. Moderate distress  HENT: Head is normocephalic and atraumatic.  Eyes: extra-ocular movements intact  Cardiovascular: Regular rate and regular rhythm. No murmur heard.  2+ bilateral distal radial pulses  Pulmonary/Chest: Breath sounds normal. No wheezes or rales.  No tenderness to palpation of the chest wall or ribs   Abdominal:  No distention.  Contusion noted in the right lower quadrant with mild tenderness to palpation, no rebound or guarding  Musculoskeletal: Old bruising noted to right knee, Right hip: Notable for contusion patient has full range of motion of bilateral hips, knees and ankles  Neurological: Awake and alert, sensation intact to bilateral lower extremities   skin: Skin is warm and dry. No rash.       DIAGNOSTIC STUDIES /  LABS  Labs Reviewed   CBC WITH DIFFERENTIAL - Abnormal; Notable for the following components:       Result Value    RBC 3.97 (*)     Hemoglobin 11.7 (*)     Hematocrit 35.7 (*)     MCHC 32.8 (*)     All other components within normal limits   COMP METABOLIC PANEL - Abnormal; Notable for the following components:    Potassium 3.5 (*)     Chloride 118 (*)     Co2 17 (*)     Glucose 104 (*)     Total Protein 5.7 (*)     Globulin 1.8 (*)     All other components " within normal limits   URINALYSIS,CULTURE IF INDICATED - Abnormal; Notable for the following components:    Bilirubin Moderate (*)     Nitrite Positive (*)     Leukocyte Esterase Small (*)     All other components within normal limits    Narrative:     Indication for culture:->Patient WITHOUT an indwelling Andrade  catheter in place with new onset of Dysuria, Frequency,  Urgency, and/or Suprapubic pain   URINE MICROSCOPIC (W/UA) - Abnormal; Notable for the following components:    RBC 2-5 (*)     All other components within normal limits    Narrative:     Indication for culture:->Patient WITHOUT an indwelling Andrade  catheter in place with new onset of Dysuria, Frequency,  Urgency, and/or Suprapubic pain   HCG QUAL SERUM   ESTIMATED GFR       All labs reviewed by me.    RADIOLOGY  DX-HIP-UNILATERAL-WITH PELVIS-1 VIEW RIGHT   Final Result         1.  No radiographic evidence of acute traumatic injury.      CT-ABDOMEN-PELVIS WITH   Final Result         1.  No acute abnormality.   2.  Right ovarian cyst, likely dominant follicle given patient age        The radiologist's interpretation of all radiological studies have been reviewed by me.    REASSESSMENT    1:13 AM - Patient seen and examined at bedside. Discussed plan of care, including imaging. Patient agrees to the plan of care. The patient will be medicated with Morphine. Ordered for labs and imaging to evaluate her symptoms.     3:57 AM - Patient treated with Morphine.    4:42 AM - Patient was reevaluated at bedside. Discussed radiology results with the patient and informed them that that are reassuring. Patient will be ambulated prior to discharge.      5:08 AM - Patient is ambulatory without issue. She is requesting a walker. Her UTI is already being treated with Keflex. Discussed discharge instructions and return precautions with the patient and they were cleared for discharge. Patient was given the opportunity to ask any further questions. She is comfortable with  discharge at this time.      COURSE & MEDICAL DECISION MAKING  Nursing notes, VS, PMSFHx reviewed in chart.    Patient is a 30-year-old female who comes in for evaluation of right hip pain and abdominal discomfort after fall.  Differential diagnosis includes intra-abdominal injury, sprain, strain, fracture, dislocation, contusion.  Diagnostic work-up includes labs, CT of the abdomen and pelvic x-rays.    Patient's initial vitals are within normal limits.  Patient is treated with morphine which relieved her pain.  Imaging returns and demonstrates no acute traumatic injuries.  Labs are unremarkable.  Urinalysis notable for infection which patient reports is currently being treated with Keflex.  Upon reassessment patient is able to ambulate with assistance and requesting a walker given the pain in her hip she is still able to ambulate and bear weight on this.  She is neurovascularly intact.  She will be provided with walker for comfort and is given strict return precautions.  Patient is then discharged in stable condition.      The patient will return for new or worsening symptoms and is stable at the time of discharge.    The patient is referred to a primary physician for blood pressure management, diabetic screening, and for all other preventative health concerns.    DISPOSITION:  Patient will be discharged home in stable condition.    FOLLOW UP:  Torres Brody M.D.  [address]  [facility] [id number] [phone]            FINAL IMPRESSION  1. Fall, initial encounter    2. Contusion of right hip, initial encounter          Taiwo RODRIGUEZ (Eva), meggan scribing for, and in the presence of, Ignacia Coon M.D..    Electronically signed by: Taiwo Zuñiga (Eva), 5/13/2021    Ignacia RODRIGUEZ M.D. personally performed the services described in this documentation, as scribed by Taiwo Zuñiga in my presence, and it is both accurate and complete.    The note accurately reflects work and decisions made by  me.  Ignacia Coon M.D.  5/13/2021  7:37 AM

## 2021-05-15 LAB
BACTERIA UR CULT: NORMAL
SIGNIFICANT IND 70042: NORMAL
SITE SITE: NORMAL
SOURCE SOURCE: NORMAL

## 2021-05-18 ENCOUNTER — APPOINTMENT (OUTPATIENT)
Dept: RADIOLOGY | Facility: MEDICAL CENTER | Age: 31
End: 2021-05-18
Attending: EMERGENCY MEDICINE
Payer: MEDICARE

## 2021-05-18 ENCOUNTER — HOSPITAL ENCOUNTER (EMERGENCY)
Facility: MEDICAL CENTER | Age: 31
End: 2021-05-18
Attending: EMERGENCY MEDICINE
Payer: MEDICARE

## 2021-05-18 VITALS
OXYGEN SATURATION: 98 % | HEART RATE: 84 BPM | DIASTOLIC BLOOD PRESSURE: 65 MMHG | SYSTOLIC BLOOD PRESSURE: 124 MMHG | RESPIRATION RATE: 18 BRPM | TEMPERATURE: 97.3 F

## 2021-05-18 VITALS
WEIGHT: 245 LBS | DIASTOLIC BLOOD PRESSURE: 76 MMHG | HEIGHT: 65 IN | BODY MASS INDEX: 40.82 KG/M2 | HEART RATE: 67 BPM | OXYGEN SATURATION: 96 % | RESPIRATION RATE: 18 BRPM | SYSTOLIC BLOOD PRESSURE: 127 MMHG | TEMPERATURE: 98.1 F

## 2021-05-18 DIAGNOSIS — S00.83XA CONTUSION OF FOREHEAD, INITIAL ENCOUNTER: ICD-10-CM

## 2021-05-18 DIAGNOSIS — S16.1XXA STRAIN OF NECK MUSCLE, INITIAL ENCOUNTER: ICD-10-CM
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
## 2021-05-18 DIAGNOSIS — J45.21 MILD INTERMITTENT ASTHMA WITH ACUTE EXACERBATION: ICD-10-CM

## 2021-05-18 DIAGNOSIS — R06.02 SOB (SHORTNESS OF BREATH): ICD-10-CM

## 2021-05-18 PROCEDURE — 96372 THER/PROPH/DIAG INJ SC/IM: CPT

## 2021-05-18 PROCEDURE — 700102 HCHG RX REV CODE 250 W/ 637 OVERRIDE(OP): Performed by: EMERGENCY MEDICINE

## 2021-05-18 PROCEDURE — 99284 EMERGENCY DEPT VISIT MOD MDM: CPT

## 2021-05-18 PROCEDURE — A9270 NON-COVERED ITEM OR SERVICE: HCPCS | Performed by: EMERGENCY MEDICINE

## 2021-05-18 PROCEDURE — 94640 AIRWAY INHALATION TREATMENT: CPT

## 2021-05-18 PROCEDURE — 700111 HCHG RX REV CODE 636 W/ 250 OVERRIDE (IP): Performed by: EMERGENCY MEDICINE

## 2021-05-18 PROCEDURE — 70450 CT HEAD/BRAIN W/O DYE: CPT | Mod: MG

## 2021-05-18 PROCEDURE — 71045 X-RAY EXAM CHEST 1 VIEW: CPT

## 2021-05-18 PROCEDURE — 72125 CT NECK SPINE W/O DYE: CPT | Mod: MF

## 2021-05-18 PROCEDURE — 700101 HCHG RX REV CODE 250: Performed by: EMERGENCY MEDICINE

## 2021-05-18 RX ORDER — IPRATROPIUM BROMIDE AND ALBUTEROL SULFATE 2.5; .5 MG/3ML; MG/3ML
3 SOLUTION RESPIRATORY (INHALATION)
Status: DISCONTINUED | OUTPATIENT
Start: 2021-05-18 | End: 2021-05-18 | Stop reason: HOSPADM

## 2021-05-18 RX ORDER — METHOCARBAMOL 500 MG/1
1000 TABLET, FILM COATED ORAL ONCE
Status: COMPLETED | OUTPATIENT
Start: 2021-05-18 | End: 2021-05-18

## 2021-05-18 RX ORDER — PREDNISONE 20 MG/1
40 TABLET ORAL DAILY
Qty: 10 TABLET | Refills: 0 | Status: SHIPPED | OUTPATIENT
Start: 2021-05-18 | End: 2021-05-23

## 2021-05-18 RX ORDER — KETOROLAC TROMETHAMINE 30 MG/ML
30 INJECTION, SOLUTION INTRAMUSCULAR; INTRAVENOUS ONCE
Status: COMPLETED | OUTPATIENT
Start: 2021-05-18 | End: 2021-05-18

## 2021-05-18 RX ORDER — METHOCARBAMOL 750 MG/1
750 TABLET, FILM COATED ORAL 4 TIMES DAILY
Qty: 40 TABLET | Refills: 0 | Status: ON HOLD
Start: 2021-05-18 | End: 2021-06-28

## 2021-05-18 RX ORDER — PREDNISONE 20 MG/1
40 TABLET ORAL ONCE
Status: COMPLETED | OUTPATIENT
Start: 2021-05-18 | End: 2021-05-18

## 2021-05-18 RX ORDER — IBUPROFEN 800 MG/1
800 TABLET ORAL EVERY 8 HOURS PRN
Qty: 30 TABLET | Refills: 0 | Status: ON HOLD | OUTPATIENT
Start: 2021-05-18 | End: 2021-06-28 | Stop reason: SDUPTHER

## 2021-05-18 RX ORDER — IPRATROPIUM BROMIDE AND ALBUTEROL SULFATE 2.5; .5 MG/3ML; MG/3ML
3 SOLUTION RESPIRATORY (INHALATION)
Status: DISCONTINUED | OUTPATIENT
Start: 2021-05-18 | End: 2021-05-18

## 2021-05-18 RX ADMIN — IPRATROPIUM BROMIDE AND ALBUTEROL SULFATE 3 ML: .5; 2.5 SOLUTION RESPIRATORY (INHALATION) at 04:03

## 2021-05-18 RX ADMIN — KETOROLAC TROMETHAMINE 30 MG: 30 INJECTION, SOLUTION INTRAMUSCULAR at 21:15

## 2021-05-18 RX ADMIN — PREDNISONE 40 MG: 20 TABLET ORAL at 03:23

## 2021-05-18 RX ADMIN — KETOROLAC TROMETHAMINE 30 MG: 30 INJECTION, SOLUTION INTRAMUSCULAR; INTRAVENOUS at 03:22

## 2021-05-18 RX ADMIN — IPRATROPIUM BROMIDE AND ALBUTEROL SULFATE 3 ML: .5; 2.5 SOLUTION RESPIRATORY (INHALATION) at 03:20

## 2021-05-18 RX ADMIN — METHOCARBAMOL 1000 MG: 500 TABLET ORAL at 21:15

## 2021-05-18 ASSESSMENT — FIBROSIS 4 INDEX
FIB4 SCORE: 0.53
FIB4 SCORE: 0.53

## 2021-05-18 ASSESSMENT — ENCOUNTER SYMPTOMS
VOMITING: 0
FEVER: 0
COUGH: 0
NAUSEA: 0
SHORTNESS OF BREATH: 1
WHEEZING: 1

## 2021-05-18 NOTE — ED TRIAGE NOTES
Kristin Balderrama   30 y.o. female   Chief Complaint   Patient presents with   • Asthma      The patient presents to the ED via triage for evaluation of an asthma attack. The patient can be heard audibly wheezing while checking into the department, although her vital signs are stable.     Charge nurse notified. Patient roomed to Red 2.     /78   Pulse 75   Temp 35.9 °C (96.6 °F) (Temporal)   Resp 18   SpO2 95%

## 2021-05-18 NOTE — ED NOTES
"Pt reports pain \"is a little better\" after meds. Pt in no acute distress, breathing appears equal and unlabored, lungs clear. No audible wheezes heard. Comfort needs addressed, call light within reach, will continue to monitor.   "

## 2021-05-18 NOTE — ED NOTES
DC home with written and verbal instructions regarding f/u, activity and RX. Verbalized understanding, all questions answered. Pt in no acute distress, respirations equal and unlabored. WC out with family with all belongings.

## 2021-05-18 NOTE — ED PROVIDER NOTES
ED Provider Note    Scribed for Ignacia Coon M.D. by Estela Jacobsen. 5/18/2021, 2:57 AM.    Primary care provider: Torres Brody M.D.  Means of arrival: Walk-in  History obtained from: Family  History limited by: None    CHIEF COMPLAINT  Chief Complaint   Patient presents with   • Asthma     HPI  Kristin Balderrama is a 30 y.o. female who presents to the Emergency Department for evaluation of an asthma attack. Per her grandmother, the patient has been to [facility] for her symptoms several times in the past few days. She was at [facility] tonight and was discharged before coming here.  She feels as if she is wheezing and having an asthma exacerbation and therefore came in for further evaluation.  She has done 3 albuterol treatments throughout the day for her symptoms.  The patient is not currently taking any steroids. The patient reports ongoing right hip pain after a fall a few days ago at which time I had evaluated her and CT scan was negative for acute bony abnormality. The patient reports seeing her primary care physician yesterday who has referred her to pulmonology due to frequent asthma exacerbations.    REVIEW OF SYSTEMS  Review of Systems   Constitutional: Negative for fever.   Respiratory: Positive for shortness of breath and wheezing. Negative for cough.    Cardiovascular: Negative for chest pain.   Gastrointestinal: Negative for nausea and vomiting.   All other systems reviewed and are negative.    PAST MEDICAL HISTORY   has a past medical history of Abdominal pain, Anginal syndrome, Apnea, sleep, Arrhythmia, Arthritis, ASTHMA, Atrial fibrillation (HCC), Back pain, Borderline personality disorder ([facility]), Breath shortness, Bronchitis, Cardiac arrhythmia, Chickenpox, Chronic UTI (9/18/2010), Cough, Daytime sleepiness, Dental disorder (03/08/2021), Depression, Diabetes ([facility]), Diarrhea, Disorder of thyroid, Fall, Fatigue, Frequent headaches, Gasping for breath, Gynecological disorder,  Headache(784.0), Heart burn, Heart murmur, History of falling, Indigestion, Migraine, Mitochondrial disease (HCC), 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    SURGICAL HISTORY   has a past surgical history that includes neuro dest facet l/s w/ig sngl (4/21/2015); recovery (1/27/2016); delmar by laparoscopy (8/29/2010); lumbar fusion anterior (8/21/2012); other cardiac surgery (1/2016); tonsillectomy; bowel stimulator insertion (7/13/2016); gastroscopy with balloon dilatation (N/A, 1/4/2017); muscle biopsy (Right, 1/26/2017); other abdominal surgery; laminotomy; and bowel stimulator insertion (3/10/2021).    SOCIAL HISTORY  Social History     Tobacco Use   • Smoking status: Never Smoker   • Smokeless tobacco: Never Used   Vaping Use   • Vaping Use: Never used   Substance Use Topics   • Alcohol use: No     Alcohol/week: 0.0 oz   • Drug use: Not Currently     Frequency: 7.0 times per week     Types: Marijuana      Social History     Substance and Sexual Activity   Drug Use Not Currently   • Frequency: 7.0 times per week   • Types: Marijuana     FAMILY HISTORY  Family History   Problem Relation Age of Onset   • Hypertension Mother    • Sleep Apnea Mother    • Heart Disease Mother    • Other Mother         hypothryod   • Hypertension Maternal Uncle    • Heart Disease Maternal Grandmother    • Hypertension Maternal Grandmother    • No Known Problems Sister    • Other Sister         Narcolepsy;fibromyalsia;bone;nerve   • Genitourinary () Problems Sister         endometriosis     CURRENT MEDICATIONS  Home Medications    **Home medications have  "not yet been reviewed for this encounter**       ALLERGIES  Allergies   Allergen Reactions   • Depakote [Divalproex Sodium] Unspecified     Muscle spasms/muscle pain and weakness     • Montelukast [Singulair]      Cardiac effusion   • Amitriptyline Unspecified     Headaches     • Aripiprazole [Abilify] Unspecified     Headaches/muscle twitching     • Clindamycin Nausea     Even with food     • Flagyl [Metronidazole Hcl] Unspecified     \"eye problems\"     • Flomax [Tamsulosin Hydrochloride] Swelling   • Levaquin Unspecified     Severe muscle cramps in legs  RXN=unknown   • Metformin Unspecified     Increased lactic acid      • Tamsulosin Swelling     Swelling of legs   • Tape Rash     Tears skin off  coban with Tegaderm tape ok intermittently  RXN=ongoing   • Vancomycin Itching     Pt becomes flushed in face and chest.   RXN=7/10/16   • Wound Dressing Adhesive Hives     By pt report   • Ampicillin Rash     Pt reports that she received a rash    • Ciprofloxacin Rash         • Keflex Rash     Pt states she gets a rash with this medication  Tolerates ceftriaxone  Can take with Benadryl   • Levofloxacin Unspecified     Leg muscle cramps   • Metronidazole Rash     \"Vision problems\"   • Penicillins Hives     Can take with Benadryl   • Sulfa Drugs Itching and Myalgia     Muscle pain and weakness   • Valproic Acid Rash     .     PHYSICAL EXAM  VITAL SIGNS: /78   Pulse 75   Temp 35.9 °C (96.6 °F) (Temporal)   Resp 18   SpO2 95%   Vitals reviewed by myself.  Physical Exam   Nursing note and vitals reviewed.  Constitutional: Well-developed and well-nourished. No acute distress.   HENT: Head is normocephalic and atraumatic.  Eyes: extra-ocular movements intact  Cardiovascular: Regular rate and regular rhythm. No murmur heard.  Pulmonary/Chest: Breath sounds normal.  Upper respiratory congested noises, no wheezes or rhonchi.  No tachypnea, no intercostal retractions or tracheal tugging  Musculoskeletal: Extremities " exhibit normal range of motion without edema or tenderness.   Neurological: Awake and alert  Skin: Skin is warm and dry. No rash.     DIAGNOSTIC STUDIES   RADIOLOGY  DX-CHEST-PORTABLE (1 VIEW)   Final Result         1.  No acute cardiopulmonary disease.        The radiologist's interpretation of all radiological studies have been reviewed by me.    REASSESSMENT  2:57 AM - Patient assessed at bedside. Patient was treated for pain and asthma. Ordered DX-Chest-Portable.     3:51 AM - Patient reassessed. She reports that her wheezing has not improved.    4:08 AM - Patient was treated with more nebulizer solution.    4:36 AM -  Reassessed the patient. Discussed plan for discharge and follow up with pulmonology. Patient understands the plan for discharge.     COURSE & MEDICAL DECISION MAKING  Nursing notes, VS, PMSFHx reviewed in chart.    Patient is a 30-year-old female who comes in for evaluation of shortness of breath.  Differential diagnosis includes congestion, viral syndrome, asthma exacerbation, pneumonia.  Diagnostic work-up includes chest x-ray.    Patient's initial vitals are within normal limits and she is well-appearing on exam.  Patient has some congested upper airway noises.  She is concerned she is having an asthma exacerbation is treated as such with DuoNeb and prednisone.  After treatment she feels improved.  Chest x-ray returns and demonstrates no acute cardiopulmonary processes.  Therefore patient is reassured and advised on management of asthma exacerbation.  I will start her on a 5-day course of prednisone and advised her continued her albuterol.  She is also advised to follow-up with pulmonology as planned.  Patient is then given strict return precautions and discharged in stable condition.    The patient will return for new or worsening symptoms and is stable at the time of discharge.    The patient is referred to a primary physician for blood pressure management, diabetic screening, and for all  other preventative health concerns.    DISPOSITION:  Patient will be discharged home in stable condition.    FOLLOW UP:  Torres Brody M.D.  [address]  [place] [id number] [phone]          OUTPATIENT MEDICATIONS:  New Prescriptions    PREDNISONE (DELTASONE) 20 MG TAB    Take 2 Tablets by mouth every day for 5 days.     FINAL IMPRESSION  1. Mild intermittent asthma with acute exacerbation    2. SOB (shortness of breath)       Estela RODRIGUEZ (Scribe), am scribing for, and in the presence of, Ignacia Coon M.D..    Electronically signed by: Estela Jacobsen (Carolibsadie), 5/18/2021    Igancia RODRIGUEZ M.D. personally performed the services described in this documentation, as scribed by Estela Jacobsen in my presence, and it is both accurate and complete.    E.     The note accurately reflects work and decisions made by me.  Ignacia Coon M.D.  5/18/2021  6:34 AM

## 2021-05-18 NOTE — ED NOTES
PT WC to room 2, per mother, patient has hx of asthma, was d/c from [facility] 2 days ago after being admitted for asthma exacerbation. States was at [facility] for a couple hours, states she took pt home, has given 3 breathing treatments since last night. Denies taking any current steroids. Pt states continuing to feels SOB, audible forced expiratory wheezes noted, no insp wheezes. Pt respirations equal bilaterally, no accessory muscle use. Sats >95% on RA.

## 2021-05-19 NOTE — DISCHARGE INSTRUCTIONS
Ice to your head and neck tonight for pain and swelling  Follow-up with your primary care doctor within the week for recheck as needed  Continue your current medications  I will be prescribing you a muscle relaxer to help with your neck pain, take it only as needed.

## 2021-05-19 NOTE — ED NOTES
All lines and monitors discontinued. Discharge instructions given, questions answered.    WC out of ER, escorted by RN.  Instructed not to drive after taking pain medication and pt verbalizes understanding.  Rx x 2 given.

## 2021-05-19 NOTE — DISCHARGE PLANNING
Medical Social Work    Referral: TBI    Intervention: Pt is a 30 year old female brought in by EMS through triage for GLF, hitting her head.  Pt is Kristin Balderrama (DO: 1990).  Pt was seen earlier this morning for asthma and seen prior to that at [facility].  Pt is well known to this worker.  Pt's emergency contact is her grandmother, Vivienne ([phone]).  Pt can utilize MTM or family for transport home as usual.    Plan: SW will follow as needed.

## 2021-05-19 NOTE — ED PROVIDER NOTES
"Scribed for Sonal Bryant D.O. by Chase Bernal. 5/18/2021, 8:16 PM.     Primary care provider: Torres Brody M.D.  Means of arrival: Walk-in         History obtained from: Patient  History limited by: None    CHIEF COMPLAINT  Chief Complaint   Patient presents with   • T-5000 FALL     pt tripped and fell around 1730 this evening hitting her head on the floor; -LOC; 81mg Aspirin daily   • Neck Pain     after fall       HPI  Kristin Balderrama is a 30 y.o. female who presents to the Emergency Department as a TBI Activation status post GLF occurring 5:30 PM tonight. Patient takes aspirin daily. Earlier tonight she was in her room when she tripped, fell to the ground, hitting her head on the ground. She denies any loss of consciousness, but continues to have dizziness, nausea, and blurry vision. Patient is fairly well known to this department, and was last seen here yesterday for asthma exacerbation, and before that on the 13th for GLF and her work up was negative at that time.    REVIEW OF SYSTEMS  Pertinent positives include GLF, head injury, dizziness, nausea, and blurry vision. Pertinent negatives include no loss of consciousness.   See HPI for further details. All other systems are negative.    PAST MEDICAL HISTORY  Past Medical History:   Diagnosis Date   • Abdominal pain    • Anginal syndrome     random chest pain especially with tachycardia   • Apnea, sleep    • Arrhythmia     \"sinus tachycardia\", cariologist, Dr. Kumar; ablation 2/2016   • Arthritis     osteo   • ASTHMA     when around smoke   • Atrial fibrillation (HCC)    • Back pain    • Borderline personality disorder (HCC)    • Breath shortness     with tachycardia   • Bronchitis    • Cardiac arrhythmia    • Chickenpox    • Chronic UTI 9/18/2010   • Cough    • Daytime sleepiness    • Dental disorder 03/08/2021    infected tooth   • Depression    • Diabetes (HCC)    • Diarrhea     incontinece   • Disorder of thyroid    • Fall    • Fatigue    • " "Frequent headaches    • Gasping for breath    • Gynecological disorder     PCOS   • Headache(784.0)    • Heart burn    • Heart murmur    • History of falling    • Indigestion    • Migraine    • Mitochondrial disease (HCC)    • 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HCC)    • Nausea    • Obesity    • Other fatigue 6/29/2020   • Pain 08-15-12    back, 7/10   • Painful joint    • PCOS (polycystic ovarian syndrome)    • Pneumonia 2012 12/2020   • Psychosis (HCC)    • Ringing in ears    • Scoliosis    • Shortness of breath     O2 as needed   • Sinus tachycardia 10/31/2013   • Sleep apnea     CPAP \"pulmonary doctor took me off mid year 2016\"   • Snoring    • Tonsillitis    • 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HCC)    • Tuberculosis     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   • Urinary bladder disorder     Suprapubic cath   • Urinary incontinence    • Weakness    • Wears glasses        FAMILY HISTORY  Family History   Problem Relation Age of Onset   • Hypertension Mother    • Sleep Apnea Mother    • Heart Disease Mother    • Other Mother         hypothryod   • Hypertension Maternal Uncle    • Heart Disease Maternal Grandmother    • Hypertension Maternal Grandmother    • No Known Problems Sister    • Other Sister         Narcolepsy;fibromyalsia;bone;nerve   • Genitourinary () Problems Sister         endometriosis       SOCIAL HISTORY  Social History     Tobacco Use   • Smoking status: Never Smoker   • Smokeless tobacco: Never Used   Vaping Use   • Vaping Use: Never used   Substance Use Topics   • Alcohol use: No     Alcohol/week: 0.0 oz   • Drug use: Not Currently     Frequency: 7.0 times per week     Types: Marijuana      Social History     Substance and Sexual Activity   Drug Use Not Currently   • Frequency: 7.0 times per week   • Types: Marijuana       SURGICAL HISTORY  Past Surgical History:   Procedure Laterality Date   • BOWEL STIMULATOR INSERTION  3/10/2021    Procedure: INSERTION, ELECTRODE LEADS AND PULSE GENERATOR, " NEUROSTIMULATOR, SACRAL - REMOVAL OF INTERSTIM WITH REPLACEMENT OF SACRAL NEUROMODULATION DEVICE;  Surgeon: Joe Noyola M.D.;  Location: SURGERY [facility];  Service: General   • MUSCLE BIOPSY Right 1/26/2017    Procedure: MUSCLE BIOPSY - THIGH;  Surgeon: Isidro Vigil M.D.;  Location: SURGERY [facility];  Service:    • GASTROSCOPY WITH BALLOON DILATATION N/A 1/4/2017    Procedure: GASTROSCOPY WITH DILATATION;  Surgeon: Torres Vargas M.D.;  Location: SURGERY [facility];  Service:    • BOWEL STIMULATOR INSERTION  7/13/2016    Procedure: BOWEL STIMULATOR INSERTION FOR PERMANENT INTERSTIM SACRAL IMPLANT;  Surgeon: Joe Noyola M.D.;  Location: SURGERY [facility];  Service:    • RECOVERY  1/27/2016    Procedure: CATH LAB EP STUDY WITH SINUS NODE MODIFICATION LA;  Surgeon: [facility];  Location: SURGERY PRE-POST PROC UNIT [facility];  Service:    • OTHER CARDIAC SURGERY  1/2016    cardiac ablation   • NEURO DEST FACET L/S W/IG SNGL  4/21/2015    Performed by Reza Tabor at SURGERY [facility]   • LUMBAR FUSION ANTERIOR  8/21/2012    Performed by SUSIE SAWANT at SURGERY [facility] ORS   • ALYSSA BY LAPAROSCOPY  8/29/2010    Performed by SHAYY JOHNS at [facility] ORS   • LAMINOTOMY     • OTHER ABDOMINAL SURGERY     • TONSILLECTOMY      tonsillectomy       CURRENT MEDICATIONS  Current Outpatient Medications   Medication Instructions   • albuterol 108 (90 Base) MCG/ACT Aero Soln inhalation aerosol 2 Puffs, Inhalation, EVERY 6 HOURS PRN   • aspirin 81 mg, Oral, EVERY MORNING   • baclofen (LIORESAL) 10 mg, Oral, 3 TIMES DAILY   • busPIRone (BUSPAR) 10 mg, Oral, 3 TIMES DAILY   • Corlanor 7.5 mg, Oral, 2 TIMES DAILY WITH MEALS   • Dulaglutide (TRULICITY) 1.5 MG/0.5ML Solution Pen-injector 0.5 mL, Subcutaneous, EVERY 7 DAYS   • ferrous sulfate 325 mg, Oral, EVERY MORNING WITH BREAKFAST   • fluoxetine (PROZAC) 40 mg, Oral, DAILY   • fluticasone-salmeterol (ADVAIR) 250-50  "MCG/DOSE AEROSOL POWDER, BREATH ACTIVATED 1 Puff, Inhalation, 2 TIMES DAILY   • ipratropium-albuterol (DUONEB) 0.5-2.5 (3) MG/3ML nebulizer solution 3 mL, Nebulization, EVERY 4 HOURS PRN, Nebulizer    • ipratropium-albuterol (DUONEB) 0.5-2.5 (3) MG/3ML nebulizer solution 3 mL, Nebulization, EVERY 4 HOURS PRN   • levothyroxine (SYNTHROID) 100 mcg, Oral, EACH MORNING ON EMPTY STOMACH   • Melatonin 10 mg, Oral, EVERY BEDTIME   • metoprolol SR (TOPROL XL) 12.5 mg, Oral, EVERY EVENING   • mycophenolate (CELLCEPT) 1,000 mg, Oral, 2 TIMES DAILY   • OXcarbazepine (TRILEPTAL) 300 mg, Oral, 2 TIMES DAILY   • potassium chloride SA (KDUR) 20 MEQ Tab CR 40 mEq, Oral, 2 TIMES DAILY, 2 tablets = 40 mEq.   • predniSONE (DELTASONE) 40 mg, Oral, DAILY   • pregabalin (LYRICA) 300 mg, Oral, 2 TIMES DAILY   • ziprasidone (GEODON) 40 MG Cap Take 1 tablet by mouth twice a day       ALLERGIES  Allergies   Allergen Reactions   • Depakote [Divalproex Sodium] Unspecified     Muscle spasms/muscle pain and weakness     • Montelukast [Singulair]      Cardiac effusion   • Amitriptyline Unspecified     Headaches     • Aripiprazole [Abilify] Unspecified     Headaches/muscle twitching     • Clindamycin Nausea     Even with food     • Flagyl [Metronidazole Hcl] Unspecified     \"eye problems\"     • Flomax [Tamsulosin Hydrochloride] Swelling   • Levaquin Unspecified     Severe muscle cramps in legs  RXN=unknown   • Metformin Unspecified     Increased lactic acid      • Tamsulosin Swelling     Swelling of legs   • Tape Rash     Tears skin off  coban with Tegaderm tape ok intermittently  RXN=ongoing   • Vancomycin Itching     Pt becomes flushed in face and chest.   RXN=7/10/16   • Wound Dressing Adhesive Hives     By pt report   • Ampicillin Rash     Pt reports that she received a rash    • Ciprofloxacin Rash         • Keflex Rash     Pt states she gets a rash with this medication  Tolerates ceftriaxone  Can take with Benadryl   • Levofloxacin " "Unspecified     Leg muscle cramps   • Metronidazole Rash     \"Vision problems\"   • Penicillins Hives     Can take with Benadryl   • Sulfa Drugs Itching and Myalgia     Muscle pain and weakness   • Valproic Acid Rash     .       PHYSICAL EXAM  VITAL SIGNS: /76   Pulse 74   Temp 36.1 °C (97 °F) (Temporal)   Resp 16   Ht 1.651 m (5' 5\")   Wt 111 kg (245 lb)   SpO2 91%   BMI 40.77 kg/m²   Constitutional: Patient is well developed, well nourished. Non-toxic appearing. No acute distress.   HENT: 2 cm contusion to the forehead. Scalp is otherwise atraumatic. Normocephalic, oral mucosa moist, no signs of trauma.  Eyes: PERRL, EOMI  Neck: Supple, tenderness midline as well as paraspinal muscles.  There is no bony step-offs or deformities.  Lymphatic: No lymphadenopathy noted.   Cardiovascular: Normal heart rate and Regular rhythm. No murmur  Thorax & Lungs: Clear and equal breath sounds with good excursion. No respiratory distress, no rhonchi, wheezing or rales.   Abdomen: Bowel sounds normal in all four quadrants. Soft, obese, nontender..   Skin: Warm, Dry   Back: No cervical, thoracic, or lumbosacral tenderness.    Extremities: Peripheral pulses 4/4 No edema, No tenderness  Musculoskeletal: Normal range of motion in all major joints. No tenderness to palpation or major deformities noted.   Neurologic: Alert & oriented x 3, Normal motor function, Normal sensory function, No lateralizing or focal deficits noted. DTR's 4/4 bilaterally.  Psychiatric: Affect normal, Judgment impaired    DIAGNOSTICS/PROCEDURES    RADIOLOGY/PROCEDURES  CT-HEAD W/O   Final Result         1.  No acute intracranial abnormality.      CT-CSPINE WITHOUT PLUS RECONS   Final Result         1.  No acute traumatic bony injury of the cervical spine is apparent.        Results and radiologist interpretation reviewed by me.     COURSE & MEDICAL DECISION MAKING  Pertinent Labs & Imaging studies reviewed. (See chart for details)    8:16 PM - " Patient seen and evaluated at charge as a TBI Activation. Ordered for CT-head w/o and CT-Cspine w/o to evaluate. Differential diagnoses include, but are not limited to intracranial bleed, skull fracture.  CT head and neck were negative for any intracranial bleeds or skull fracture.  There was no cervical spine injury.    8:57 PM - Patient treated with Toradol 30 mg and Robaxin 1000 mg.     Patient was reevaluated at bedside. Discussed radiology results with the patient and informed them they are reassuring. Return precautions were discussed with the patient, and they were cleared for discharge at this time. Patient was understanding and agreeable to discharge.       The patient is referred to a primary physician for blood pressure management, diabetic screening, and for all other preventative health concerns.    DISPOSITION:  Patient will be discharged home in stable condition.    FOLLOW UP:  Torres Brody M.D.  [address]  [facility] [address]  [phone]    Schedule an appointment as soon as possible for a visit in 1 week  For recheck      OUTPATIENT MEDICATIONS:  New Prescriptions    IBUPROFEN (MOTRIN) 800 MG TAB    Take 1 tablet by mouth every 8 hours as needed for Moderate Pain. Take with food    METHOCARBAMOL (ROBAXIN) 750 MG TAB    Take 1 tablet by mouth 4 times a day. As needed for muscle spasm       FINAL IMPRESSION  1. Fall, initial encounter    2. Contusion of forehead, initial encounter    3. Strain of neck muscle, initial encounter         Chase RODRIGUEZ), am scribing for, and in the presence of, Sonal Bryant D.O..    Electronically signed by: Chase Espinosa), 5/18/2021    Sonal RODRIGUEZ D.O. personally performed the services described in this documentation, as scribed by Chase Bernal in my presence, and it is both accurate and complete. C.    The note accurately reflects work and decisions made by me.  Sonal Bryant D.O.  5/19/2021  5:14 AM

## 2021-05-19 NOTE — ED NOTES
Pt alerted as TBI; Pt brought to CT with RN. Pt complaining of right foot, hip and hand pain. Pt has small abrasion to forehead. Pt takes Aspirin daily, pt denies LOC.

## 2021-05-23 ENCOUNTER — APPOINTMENT (OUTPATIENT)
Dept: RADIOLOGY | Facility: MEDICAL CENTER | Age: 31
End: 2021-05-23
Attending: EMERGENCY MEDICINE
Payer: MEDICARE

## 2021-05-23 ENCOUNTER — OFFICE VISIT (OUTPATIENT)
Dept: URGENT CARE | Facility: CLINIC | Age: 31
End: 2021-05-23
Payer: MEDICARE

## 2021-05-23 ENCOUNTER — HOSPITAL ENCOUNTER (EMERGENCY)
Facility: MEDICAL CENTER | Age: 31
End: 2021-05-23
Attending: EMERGENCY MEDICINE
Payer: MEDICARE

## 2021-05-23 ENCOUNTER — APPOINTMENT (OUTPATIENT)
Dept: RADIOLOGY | Facility: IMAGING CENTER | Age: 31
End: 2021-05-23
Attending: FAMILY MEDICINE
Payer: MEDICARE

## 2021-05-23 VITALS
DIASTOLIC BLOOD PRESSURE: 75 MMHG | OXYGEN SATURATION: 96 % | HEART RATE: 74 BPM | TEMPERATURE: 97.5 F | BODY MASS INDEX: 42.98 KG/M2 | WEIGHT: 257.94 LBS | SYSTOLIC BLOOD PRESSURE: 125 MMHG | RESPIRATION RATE: 17 BRPM | HEIGHT: 65 IN

## 2021-05-23 VITALS
DIASTOLIC BLOOD PRESSURE: 74 MMHG | HEART RATE: 74 BPM | OXYGEN SATURATION: 94 % | HEIGHT: 65 IN | WEIGHT: 249 LBS | RESPIRATION RATE: 20 BRPM | TEMPERATURE: 99.1 F | BODY MASS INDEX: 41.48 KG/M2 | SYSTOLIC BLOOD PRESSURE: 118 MMHG

## 2021-05-23 DIAGNOSIS — R10.84 GENERALIZED ABDOMINAL PAIN: ICD-10-CM

## 2021-05-23 DIAGNOSIS — R19.7 DIARRHEA, UNSPECIFIED TYPE: ICD-10-CM

## 2021-05-23 DIAGNOSIS — M25.561 ACUTE PAIN OF RIGHT KNEE: ICD-10-CM

## 2021-05-23 DIAGNOSIS — K92.1 HEMATOCHEZIA: ICD-10-CM
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
## 2021-05-23 DIAGNOSIS — K92.1 BLACK STOOLS: ICD-10-CM

## 2021-05-23 LAB
ALBUMIN SERPL BCP-MCNC: 3.9 G/DL (ref 3.2–4.9)
ALBUMIN/GLOB SERPL: 2 G/DL
ALP SERPL-CCNC: 89 U/L (ref 30–99)
ALT SERPL-CCNC: 17 U/L (ref 2–50)
ANION GAP SERPL CALC-SCNC: 10 MMOL/L (ref 7–16)
AST SERPL-CCNC: 15 U/L (ref 12–45)
BASOPHILS # BLD AUTO: 0.4 % (ref 0–1.8)
BASOPHILS # BLD: 0.02 K/UL (ref 0–0.12)
BILIRUB SERPL-MCNC: 0.3 MG/DL (ref 0.1–1.5)
BUN SERPL-MCNC: 9 MG/DL (ref 8–22)
CALCIUM SERPL-MCNC: 9.2 MG/DL (ref 8.5–10.5)
CHLORIDE SERPL-SCNC: 110 MMOL/L (ref 96–112)
CO2 SERPL-SCNC: 21 MMOL/L (ref 20–33)
CREAT SERPL-MCNC: 0.65 MG/DL (ref 0.5–1.4)
EOSINOPHIL # BLD AUTO: 0.09 K/UL (ref 0–0.51)
EOSINOPHIL NFR BLD: 2 % (ref 0–6.9)
ERYTHROCYTE [DISTWIDTH] IN BLOOD BY AUTOMATED COUNT: 52.2 FL (ref 35.9–50)
GLOBULIN SER CALC-MCNC: 2 G/DL (ref 1.9–3.5)
GLUCOSE SERPL-MCNC: 97 MG/DL (ref 65–99)
HCG SERPL QL: NEGATIVE
HCT VFR BLD AUTO: 40.1 % (ref 37–47)
HGB BLD-MCNC: 12.5 G/DL (ref 12–16)
IMM GRANULOCYTES # BLD AUTO: 0.04 K/UL (ref 0–0.11)
IMM GRANULOCYTES NFR BLD AUTO: 0.9 % (ref 0–0.9)
LIPASE SERPL-CCNC: 31 U/L (ref 11–82)
LYMPHOCYTES # BLD AUTO: 1.12 K/UL (ref 1–4.8)
LYMPHOCYTES NFR BLD: 24.5 % (ref 22–41)
MCH RBC QN AUTO: 29.2 PG (ref 27–33)
MCHC RBC AUTO-ENTMCNC: 31.2 G/DL (ref 33.6–35)
MCV RBC AUTO: 93.7 FL (ref 81.4–97.8)
MONOCYTES # BLD AUTO: 0.36 K/UL (ref 0–0.85)
MONOCYTES NFR BLD AUTO: 7.9 % (ref 0–13.4)
NEUTROPHILS # BLD AUTO: 2.95 K/UL (ref 2–7.15)
NEUTROPHILS NFR BLD: 64.3 % (ref 44–72)
NRBC # BLD AUTO: 0 K/UL
NRBC BLD-RTO: 0 /100 WBC
PLATELET # BLD AUTO: 164 K/UL (ref 164–446)
PMV BLD AUTO: 11.8 FL (ref 9–12.9)
POTASSIUM SERPL-SCNC: 4.4 MMOL/L (ref 3.6–5.5)
PROT SERPL-MCNC: 5.9 G/DL (ref 6–8.2)
RBC # BLD AUTO: 4.28 M/UL (ref 4.2–5.4)
SODIUM SERPL-SCNC: 141 MMOL/L (ref 135–145)
WBC # BLD AUTO: 4.6 K/UL (ref 4.8–10.8)

## 2021-05-23 PROCEDURE — 73564 X-RAY EXAM KNEE 4 OR MORE: CPT | Mod: TC,RT | Performed by: FAMILY MEDICINE

## 2021-05-23 PROCEDURE — 84703 CHORIONIC GONADOTROPIN ASSAY: CPT

## 2021-05-23 PROCEDURE — 700117 HCHG RX CONTRAST REV CODE 255: Performed by: EMERGENCY MEDICINE

## 2021-05-23 PROCEDURE — 85025 COMPLETE CBC W/AUTO DIFF WBC: CPT

## 2021-05-23 PROCEDURE — 83690 ASSAY OF LIPASE: CPT

## 2021-05-23 PROCEDURE — 700102 HCHG RX REV CODE 250 W/ 637 OVERRIDE(OP): Performed by: EMERGENCY MEDICINE

## 2021-05-23 PROCEDURE — 80053 COMPREHEN METABOLIC PANEL: CPT

## 2021-05-23 PROCEDURE — 74177 CT ABD & PELVIS W/CONTRAST: CPT | Mod: MG

## 2021-05-23 PROCEDURE — A9270 NON-COVERED ITEM OR SERVICE: HCPCS | Performed by: EMERGENCY MEDICINE

## 2021-05-23 PROCEDURE — 99284 EMERGENCY DEPT VISIT MOD MDM: CPT

## 2021-05-23 PROCEDURE — 99213 OFFICE O/P EST LOW 20 MIN: CPT | Performed by: FAMILY MEDICINE

## 2021-05-23 RX ORDER — OMEPRAZOLE 40 MG/1
40 CAPSULE, DELAYED RELEASE ORAL DAILY
Qty: 30 CAPSULE | Refills: 0 | Status: SHIPPED | OUTPATIENT
Start: 2021-05-23 | End: 2021-09-24

## 2021-05-23 RX ORDER — OMEPRAZOLE 20 MG/1
40 CAPSULE, DELAYED RELEASE ORAL ONCE
Status: COMPLETED | OUTPATIENT
Start: 2021-05-23 | End: 2021-05-23

## 2021-05-23 RX ADMIN — OMEPRAZOLE 40 MG: 20 CAPSULE, DELAYED RELEASE ORAL at 19:01

## 2021-05-23 RX ADMIN — IOHEXOL 90 ML: 350 INJECTION, SOLUTION INTRAVENOUS at 17:09

## 2021-05-23 ASSESSMENT — ENCOUNTER SYMPTOMS
COUGH: 0
FEVER: 0
ABDOMINAL PAIN: 1
BLOOD IN STOOL: 1
VOMITING: 0
SORE THROAT: 0

## 2021-05-23 ASSESSMENT — FIBROSIS 4 INDEX
FIB4 SCORE: 0.53
FIB4 SCORE: 0.53

## 2021-05-23 NOTE — ED PROVIDER NOTES
"ED Provider Note    CHIEF COMPLAINT  Chief Complaint   Patient presents with   • Sent from Urgent Care   • Bloody Stools     x2 days, black. +occult stool at . believes she may have an ulcer. takes ASA daily.        HPI  Kristin Balderrama is a 30 y.o. female who presents to the emergency department complaining of bloody stools and diarrhea.  The patient states she has had diarrhea for the last several days.  She reports about 5-6 episodes of diarrhea.  It is occasionally yellow and occasionally black or bloody.  She said some black tarry stool and some bloody stool.  Is been associate some diffuse intermittent abdominal cramping.  No nausea, no fevers or chills.  She went to the urgent care and had a positive rectal exam was sent here for further evaluation.  The patient reports a history of ulcers.  She reports history of endoscopy and continued risk for development of ulcers.  She is taking daily aspirin and ibuprofen several times a day.  She denies pregnancy.  She has any fevers or chills.  Denies any blood thinners other than aspirin.  She denies any other aggravating relieving factors or associated complaints.  No history of colitis.    REVIEW OF SYSTEMS  See HPI for further details. All other systems are negative.    PAST MEDICAL HISTORY  Past Medical History:   Diagnosis Date   • Abdominal pain    • Anginal syndrome     random chest pain especially with tachycardia   • Apnea, sleep    • Arrhythmia     \"sinus tachycardia\", cariologist, Dr. Kumar; ablation 2/2016   • Arthritis     osteo   • ASTHMA     when around smoke   • Atrial fibrillation (HCC)    • Back pain    • Borderline personality disorder (HCC)    • Breath shortness     with tachycardia   • Bronchitis    • Cardiac arrhythmia    • Chickenpox    • Chronic UTI 9/18/2010   • Cough    • Daytime sleepiness    • Dental disorder 03/08/2021    infected tooth   • Depression    • Diabetes (HCC)    • Diarrhea     incontinece   • Disorder of thyroid    • " "Fall    • Fatigue    • Frequent headaches    • Gasping for breath    • Gynecological disorder     PCOS   • Headache(784.0)    • Heart burn    • Heart murmur    • History of falling    • Indigestion    • Migraine    • Mitochondrial disease (HCC)    • 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HCC)    • Nausea    • Obesity    • Other fatigue 6/29/2020   • Pain 08-15-12    back, 7/10   • Painful joint    • PCOS (polycystic ovarian syndrome)    • Pneumonia 2012 12/2020   • Psychosis (HCC)    • Ringing in ears    • Scoliosis    • Shortness of breath     O2 as needed   • Sinus tachycardia 10/31/2013   • Sleep apnea     CPAP \"pulmonary doctor took me off mid year 2016\"   • Snoring    • Tonsillitis    • 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HCC)    • Tuberculosis     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   • Urinary bladder disorder     Suprapubic cath   • Urinary incontinence    • Weakness    • Wears glasses        FAMILY HISTORY  Family History   Problem Relation Age of Onset   • Hypertension Mother    • Sleep Apnea Mother    • Heart Disease Mother    • Other Mother         hypothryod   • Hypertension Maternal Uncle    • Heart Disease Maternal Grandmother    • Hypertension Maternal Grandmother    • No Known Problems Sister    • Other Sister         Narcolepsy;fibromyalsia;bone;nerve   • Genitourinary () Problems Sister         endometriosis       SOCIAL HISTORY  Social History     Socioeconomic History   • Marital status: Single     Spouse name: Not on file   • Number of children: Not on file   • Years of education: Not on file   • Highest education level: Not on file   Occupational History   • Not on file   Tobacco Use   • Smoking status: Never Smoker   • Smokeless tobacco: Never Used   Vaping Use   • Vaping Use: Never used   Substance and Sexual Activity   • Alcohol use: No     Alcohol/week: 0.0 oz   • Drug use: Not Currently     Frequency: 7.0 times per week     Types: Marijuana   • Sexual activity: Not Currently     Birth " control/protection: Pill   Other Topics Concern   • Not on file   Social History Narrative    ** Merged History Encounter **          Social Determinants of Health     Financial Resource Strain: Medium Risk   • Difficulty of Paying Living Expenses: Somewhat hard   Food Insecurity: Food Insecurity Present   • Worried About Running Out of Food in the Last Year: Sometimes true   • Ran Out of Food in the Last Year: Sometimes true   Transportation Needs: Unmet Transportation Needs   • Lack of Transportation (Medical): No   • Lack of Transportation (Non-Medical): Yes   Physical Activity:    • Days of Exercise per Week:    • Minutes of Exercise per Session:    Stress:    • Feeling of Stress :    Social Connections:    • Frequency of Communication with Friends and Family:    • Frequency of Social Gatherings with Friends and Family:    • Attends [employer] Services:    • Active Member of Clubs or Organizations:    • Attends Club or Organization Meetings:    • Marital Status:    Intimate Partner Violence:    • Fear of Current or Ex-Partner:    • Emotionally Abused:    • Physically Abused:    • Sexually Abused:        SURGICAL HISTORY  Past Surgical History:   Procedure Laterality Date   • BOWEL STIMULATOR INSERTION  3/10/2021    Procedure: INSERTION, ELECTRODE LEADS AND PULSE GENERATOR, NEUROSTIMULATOR, SACRAL - REMOVAL OF INTERSTIM WITH REPLACEMENT OF SACRAL NEUROMODULATION DEVICE;  Surgeon: Joe Noyola M.D.;  Location: [facility];  Service: General   • MUSCLE BIOPSY Right 1/26/2017    Procedure: MUSCLE BIOPSY - THIGH;  Surgeon: Isidro Vigil M.D.;  Location: [facility];  Service:    • GASTROSCOPY WITH BALLOON DILATATION N/A 1/4/2017    Procedure: GASTROSCOPY WITH DILATATION;  Surgeon: Torres Vargas M.D.;  Location: [facility];  Service:    • BOWEL STIMULATOR INSERTION  7/13/2016    Procedure: BOWEL STIMULATOR INSERTION FOR PERMANENT INTERSTIM SACRAL IMPLANT;  Surgeon: Joe Noyola  M.D.;  Location: [facility];  Service:    • RECOVERY  1/27/2016    Procedure: CATH LAB EP STUDY WITH SINUS NODE MODIFICATION ABHINAV;  Surgeon: Lina Surgery;  Location: SURGERY PRE-POST PROC UNIT [facility] [place];  Service:    • OTHER CARDIAC SURGERY  1/2016    cardiac ablation   • NEURO DEST FACET L/S W/IG SNGL  4/21/2015    Performed by Reza Tabor at [facility]   • LUMBAR FUSION ANTERIOR  8/21/2012    Performed by SUSIE SAWANT at SUR[facility]   • ALYSSA BY LAPAROSCOPY  8/29/2010    Performed by SHAYY JOHNS at SUR[facility]   • LAMINOTOMY     • OTHER ABDOMINAL SURGERY     • TONSILLECTOMY      tonsillectomy       CURRENT MEDICATIONS  Home Medications     Reviewed by Thony Hatch R.N. (Registered Nurse) on 05/23/21 at 1301  Med List Status: Partial   Medication Last Dose Status   albuterol 108 (90 Base) MCG/ACT Aero Soln inhalation aerosol  Active   aspirin 81 MG EC tablet  Active   baclofen (LIORESAL) 10 MG Tab  Active   busPIRone (BUSPAR) 10 MG Tab tablet  Active   Dulaglutide (TRULICITY) 1.5 MG/0.5ML Solution Pen-injector  Active   ferrous sulfate 325 (65 Fe) MG tablet  Active   fluoxetine (PROZAC) 40 MG capsule  Active   fluticasone-salmeterol (ADVAIR) 250-50 MCG/DOSE AEROSOL POWDER, BREATH ACTIVATED  Active   ibuprofen (MOTRIN) 800 MG Tab  Active   ipratropium-albuterol (DUONEB) 0.5-2.5 (3) MG/3ML nebulizer solution  Active   ivabradine (CORLANOR) 7.5 MG Tab tablet  Active   levothyroxine (SYNTHROID) 100 MCG Tab  Active   Melatonin 10 MG Tab  Active   methocarbamol (ROBAXIN) 750 MG Tab  Active   metoprolol SR (TOPROL XL) 25 MG TABLET SR 24 HR  Active   mycophenolate (CELLCEPT) 500 MG tablet  Active   OXcarbazepine (TRILEPTAL) 300 MG Tab  Active   potassium chloride SA (KDUR) 20 MEQ Tab CR  Active   predniSONE (DELTASONE) 20 MG Tab  Active   pregabalin (LYRICA) 150 MG Cap  Active   ziprasidone (GEODON) 40 MG Cap  Active                ALLERGIES  Allergies  "  Allergen Reactions   • Depakote [Divalproex Sodium] Unspecified     Muscle spasms/muscle pain and weakness     • Montelukast [Singulair]      Cardiac effusion   • Amitriptyline Unspecified     Headaches     • Aripiprazole [Abilify] Unspecified     Headaches/muscle twitching     • Clindamycin Nausea     Even with food     • Flagyl [Metronidazole Hcl] Unspecified     \"eye problems\"     • Flomax [Tamsulosin Hydrochloride] Swelling   • Levaquin Unspecified     Severe muscle cramps in legs  RXN=unknown   • Metformin Unspecified     Increased lactic acid      • Tamsulosin Swelling     Swelling of legs   • Tape Rash     Tears skin off  coban with Tegaderm tape ok intermittently  RXN=ongoing   • Vancomycin Itching     Pt becomes flushed in face and chest.   RXN=7/10/16   • Wound Dressing Adhesive Hives     By pt report   • Ampicillin Rash     Pt reports that she received a rash    • Ciprofloxacin Rash         • Keflex Rash     Pt states she gets a rash with this medication  Tolerates ceftriaxone  Can take with Benadryl   • Levofloxacin Unspecified     Leg muscle cramps   • Metronidazole Rash     \"Vision problems\"   • Penicillins Hives     Can take with Benadryl   • Sulfa Drugs Itching and Myalgia     Muscle pain and weakness   • Valproic Acid Rash     .       PHYSICAL EXAM  VITAL SIGNS: /77   Pulse 82   Temp 36.4 °C (97.5 °F) (Temporal)   Resp 16   Ht 1.651 m (5' 5\")   Wt 117 kg (257 lb 15 oz)   SpO2 95%   BMI 42.92 kg/m²    Constitutional: Awake alert nontoxic no acute distress  HENT: Normocephalic, Atraumatic, Bilateral external ears normal, Oropharynx moist, No oral exudates, Nose normal.   Eyes: PERRL, EOMI, Conjunctiva normal, No discharge.   Neck: Normal range of motion,  Cardiovascular: Normal heart rate, Normal rhythm, No murmurs, No rubs, No gallops.   Thorax & Lungs: Normal breath sounds, No respiratory distress,  Abdomen: Soft diffusely tender without peritonitis  Rectal exam: Performed with RN " Ignacia as chaperone.  No hemorrhoids.  Brown stool trace guaiac positive.  Skin: Warm, Dry, No erythema, No rash.   Musculoskeletal: Good range of motion in all major joints.  Neurologic: Alert, No focal deficits noted.   Psychiatric: Affect normal      Results for orders placed or performed during the hospital encounter of 05/23/21   CBC WITH DIFFERENTIAL   Result Value Ref Range    WBC 4.6 (L) 4.8 - 10.8 K/uL    RBC 4.28 4.20 - 5.40 M/uL    Hemoglobin 12.5 12.0 - 16.0 g/dL    Hematocrit 40.1 37.0 - 47.0 %    MCV 93.7 81.4 - 97.8 fL    MCH 29.2 27.0 - 33.0 pg    MCHC 31.2 (L) 33.6 - 35.0 g/dL    RDW 52.2 (H) 35.9 - 50.0 fL    Platelet Count 164 164 - 446 K/uL    MPV 11.8 9.0 - 12.9 fL    Neutrophils-Polys 64.30 44.00 - 72.00 %    Lymphocytes 24.50 22.00 - 41.00 %    Monocytes 7.90 0.00 - 13.40 %    Eosinophils 2.00 0.00 - 6.90 %    Basophils 0.40 0.00 - 1.80 %    Immature Granulocytes 0.90 0.00 - 0.90 %    Nucleated RBC 0.00 /100 WBC    Neutrophils (Absolute) 2.95 2.00 - 7.15 K/uL    Lymphs (Absolute) 1.12 1.00 - 4.80 K/uL    Monos (Absolute) 0.36 0.00 - 0.85 K/uL    Eos (Absolute) 0.09 0.00 - 0.51 K/uL    Baso (Absolute) 0.02 0.00 - 0.12 K/uL    Immature Granulocytes (abs) 0.04 0.00 - 0.11 K/uL    NRBC (Absolute) 0.00 K/uL   COMP METABOLIC PANEL   Result Value Ref Range    Sodium 141 135 - 145 mmol/L    Potassium 4.4 3.6 - 5.5 mmol/L    Chloride 110 96 - 112 mmol/L    Co2 21 20 - 33 mmol/L    Anion Gap 10.0 7.0 - 16.0    Glucose 97 65 - 99 mg/dL    Bun 9 8 - 22 mg/dL    Creatinine 0.65 0.50 - 1.40 mg/dL    Calcium 9.2 8.5 - 10.5 mg/dL    AST(SGOT) 15 12 - 45 U/L    ALT(SGPT) 17 2 - 50 U/L    Alkaline Phosphatase 89 30 - 99 U/L    Total Bilirubin 0.3 0.1 - 1.5 mg/dL    Albumin 3.9 3.2 - 4.9 g/dL    Total Protein 5.9 (L) 6.0 - 8.2 g/dL    Globulin 2.0 1.9 - 3.5 g/dL    A-G Ratio 2.0 g/dL   LIPASE   Result Value Ref Range    Lipase 31 11 - 82 U/L   HCG QUAL SERUM   Result Value Ref Range    Beta-Hcg Qualitative Serum  Negative Negative   ESTIMATED GFR   Result Value Ref Range    GFR If African American >60 >60 mL/min/1.73 m 2    GFR If Non African American >60 >60 mL/min/1.73 m 2     *Note: Due to a large number of results and/or encounters for the requested time period, some results have not been displayed. A complete set of results can be found in Results Review.        RADIOLOGY/PROCEDURES  CT-ABDOMEN-PELVIS WITH   Final Result         1.  New trace pleural effusions.   2.  Mild hepatosplenomegaly.   3.  Prior cholecystectomy.                     COURSE & MEDICAL DECISION MAKING  Pertinent Labs & Imaging studies reviewed. (See chart for details)    The patient presents to the emergency department for evaluation of abdominal discomfort, diarrhea and bloody stools.  She reports a history of previous endoscopy and previous upper GI bleed.  Differential diagnosis includes but is not limited to upper GI bleed, ulcer, gastritis, colitis, lower GI bleed, hemorrhoid, or bleeding from irritation from diarrhea.    Because of the patient's discomfort and concern about colitis and she will require imaging.  She is return for labs and imaging.  CBC CMP and pregnancy test    The patient's chart is reviewed.  She has a complicated past medical follow-up with problems multiple risk factors for upper GI bleed.  I do not see an endoscopy in the chart.  She could certainly have an ulcer.  She does have some abdominal discomfort and bloody stool could be related to some diarrhea.    CT does not show any evidence of colitis.  Her CBC is normal unchanged her recent CBC was metabolic panel does not show critically elevated BUN or other significant abnormality.    I think she can probably go home I think she is not having a significant upper GI bleed.  We will put her on a PPI.  I spoke with Dr. Carmona on-call for GI.  We have reviewed the patient's vital signs, lab results and she feels she can be discharged home.  She will arrange follow-up to  see her in the clinic this week.    The patient is on steroids for an asthma exacerbation states her last day will have her take this because of her breathing issues.  She will then stop taking steroids.  She is on Robaxin however minimizes and will have her stop taking ibuprofen.  She can switch to Tylenol for her discomfort.    Put on a PPI first dose in the ED and sent home with a prescription for high-dose PPI.  Because she could decompensate advised her to return tomorrow for recheck for repeat labs and reassessment.  Return sooner for worsening pain, increasing bloody stool, or other concerns.    At this point she is agreeable to plan.  Questions are answered and she is discharged in good condition.    Torres Brody M.D.  [address]  [place] [id number]  [phone]          Bhavana Carmona D.O.  [address] [facility]  [place] [id number] [phone]              FINAL IMPRESSION  1. Generalized abdominal pain     2. Diarrhea, unspecified type     3. Hematochezia         3.         Electronically signed by: Brian Amanda M.D., 5/23/2021 2:16 PM

## 2021-05-23 NOTE — ED TRIAGE NOTES
"Chief Complaint   Patient presents with   • Sent from Urgent Care   • Bloody Stools     x2 days, black. +occult stool at . believes she may have an ulcer. takes ASA daily.      Pt ambulatory to triage for above. NAD noted. VSS.     /77   Pulse 82   Temp 36.4 °C (97.5 °F) (Temporal)   Resp 16   Ht 1.651 m (5' 5\")   Wt 117 kg (257 lb 15 oz)   SpO2 95%   BMI 42.92 kg/m²     "

## 2021-05-23 NOTE — PROGRESS NOTES
"Subjective:     Kristin Balderrama is a 30 y.o. female who presents for Knee Pain (x 10 days, painful and unable to straighten the knee.) and Other (Pt. stated she has blood in her feces and also blood in the toilet)    HPI  Pt is a 30-year-old female with a complex past history who presents for evaluation of knee pain and bloody stools    Patient with knee pain after a fall about 10 days ago  Patient was evaluated in the emergency room same day after this fall  At the ER, had normal CT abdomen pelvis and normal hip x-rays  Initially knee was less painful than hip, however knee has become more painful the last week  Having difficulty straightening her knee  Pain is constant, all over the knee, and worse in the anterior knee  Knee feels like it \"needs to pop\"    Hx of hemorrhoids in the past which have had bleeding, however bleeding is usually brighter red   Hemoglobin tested multiple times throughout the last 6 weeks and has been stable between 11-12  Patient notes that the past 2 days she has had black stools  Feels that the amount of blood is much more than usual, and notes that it is more of a maroon color  Has passed some black blood clots   Has some upper abdominal pain    Review of Systems   Constitutional: Negative for fever.   HENT: Negative for sore throat.    Respiratory: Negative for cough.    Gastrointestinal: Positive for abdominal pain, blood in stool and melena. Negative for vomiting.   Skin: Negative for rash.     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  MEDS:   Current Outpatient Medications:   •  predniSONE (DELTASONE) 20 MG Tab, Take 2 Tablets by mouth every day for 5 days., Disp: 10 tablet, Rfl: 0  •  methocarbamol (ROBAXIN) 750 MG Tab, Take 1 tablet by mouth 4 times a day. As needed for muscle spasm, Disp: 40 tablet, Rfl: 0  •  ibuprofen (MOTRIN) 800 MG Tab, Take 1 tablet by mouth every 8 hours as needed for Moderate Pain. Take with food, Disp: 30 tablet, Rfl: 0  •  pregabalin (LYRICA) 150 MG Cap, Take 300 mg by mouth 2 times a day., Disp: , Rfl:   •  fluticasone-salmeterol (ADVAIR) 250-50 MCG/DOSE AEROSOL POWDER, BREATH ACTIVATED, Inhale 1 Puff 2 times a day for 60 days., Disp: 1 Each, Rfl: 1  •  busPIRone (BUSPAR) 10 MG Tab tablet, Take 10 mg by mouth 3 times a day., Disp: , Rfl:   •  ipratropium-albuterol (DUONEB) 0.5-2.5 (3) MG/3ML nebulizer solution, Take 3 mL by nebulization every four hours as needed for Shortness of Breath. Nebulizer, Disp: 360 mL, Rfl: 2  •  ferrous sulfate 325 (65 Fe) MG tablet, Take 1 tablet by mouth every morning with breakfast., Disp: 30 tablet, Rfl: 3  •  metoprolol SR (TOPROL XL) 25 MG TABLET SR 24 HR, Take 0.5 Tablets by mouth every evening. (Patient taking differently: Take 12.5 mg by mouth every evening. 0.5 tablet = 12.5 mg), Disp: 30 tablet, Rfl: 1  •  baclofen (LIORESAL) 10 MG Tab, Take 1 tablet by mouth 3 times a day., Disp: 90 tablet, Rfl: 2  •  ivabradine (CORLANOR) 7.5 MG Tab tablet, Take 1 tablet by  "mouth 2 times a day, with meals., Disp: 180 tablet, Rfl: 3  •  ziprasidone (GEODON) 40 MG Cap, Take 1 tablet by mouth twice a day (Patient taking differently: Take 40 mg by mouth 2 times a day.), Disp: 180 capsule, Rfl: 0  •  OXcarbazepine (TRILEPTAL) 300 MG Tab, Take 1 tablet by mouth 2 times a day., Disp: 180 tablet, Rfl: 0  •  fluoxetine (PROZAC) 40 MG capsule, Take 1 capsule by mouth every day., Disp: 90 capsule, Rfl: 0  •  aspirin 81 MG EC tablet, Take 81 mg by mouth every morning., Disp: , Rfl:   •  Dulaglutide (TRULICITY) 1.5 MG/0.5ML Solution Pen-injector, Inject 0.5 mL under the skin every 7 days. (Patient taking differently: Inject 0.5 mL under the skin every Friday.), Disp: 6 mL, Rfl: 3  •  potassium chloride SA (KDUR) 20 MEQ Tab CR, Take 40 mEq by mouth 2 times a day. 2 tablets = 40 mEq., Disp: , Rfl:   •  mycophenolate (CELLCEPT) 500 MG tablet, Take 2 Tablets by mouth 2 times a day. (Patient taking differently: Take 500 mg by mouth 2 times a day. 500mg twice daily), Disp: 120 tablet, Rfl: 0  •  levothyroxine (SYNTHROID) 100 MCG Tab, Take 100 mcg by mouth Every morning on an empty stomach., Disp: , Rfl:   •  Melatonin 10 MG Tab, Take 10 mg by mouth every bedtime., Disp: , Rfl:   •  albuterol 108 (90 Base) MCG/ACT Aero Soln inhalation aerosol, Inhale 2 Puffs every 6 hours as needed for Shortness of Breath., Disp: 8.5 g, Rfl: 6  ALLERGIES:   Allergies   Allergen Reactions   • Depakote [Divalproex Sodium] Unspecified     Muscle spasms/muscle pain and weakness     • Montelukast [Singulair]      Cardiac effusion   • Amitriptyline Unspecified     Headaches     • Aripiprazole [Abilify] Unspecified     Headaches/muscle twitching     • Clindamycin Nausea     Even with food     • Flagyl [Metronidazole Hcl] Unspecified     \"eye problems\"     • Flomax [Tamsulosin Hydrochloride] Swelling   • Levaquin Unspecified     Severe muscle cramps in legs  RXN=unknown   • Metformin Unspecified     Increased lactic acid      • " "Tamsulosin Swelling     Swelling of legs   • Tape Rash     Tears skin off  coban with Tegaderm tape ok intermittently  RXN=ongoing   • Vancomycin Itching     Pt becomes flushed in face and chest.   RXN=7/10/16   • Wound Dressing Adhesive Hives     By pt report   • Ampicillin Rash     Pt reports that she received a rash    • Ciprofloxacin Rash         • Keflex Rash     Pt states she gets a rash with this medication  Tolerates ceftriaxone  Can take with Benadryl   • Levofloxacin Unspecified     Leg muscle cramps   • Metronidazole Rash     \"Vision problems\"   • Penicillins Hives     Can take with Benadryl   • Sulfa Drugs Itching and Myalgia     Muscle pain and weakness   • Valproic Acid Rash     .     SURGHX:   Past Surgical History:   Procedure Laterality Date   • BOWEL STIMULATOR INSERTION  3/10/2021    Procedure: INSERTION, ELECTRODE LEADS AND PULSE GENERATOR, NEUROSTIMULATOR, SACRAL - REMOVAL OF INTERSTIM WITH REPLACEMENT OF SACRAL NEUROMODULATION DEVICE;  Surgeon: Joe Noyola M.D.;  Location: [facility];  Service: General   • MUSCLE BIOPSY Right 1/26/2017    Procedure: MUSCLE BIOPSY - THIGH;  Surgeon: Isidro Vigil M.D.;  Location: [facility];  Service:    • GASTROSCOPY WITH BALLOON DILATATION N/A 1/4/2017    Procedure: GASTROSCOPY WITH DILATATION;  Surgeon: Torres Vargas M.D.;  Location: [facility];  Service:    • BOWEL STIMULATOR INSERTION  7/13/2016    Procedure: BOWEL STIMULATOR INSERTION FOR PERMANENT INTERSTIM SACRAL IMPLANT;  Surgeon: Joe Noyola M.D.;  Location: [facility];  Service:    • RECOVERY  1/27/2016    Procedure: CATH LAB EP STUDY WITH SINUS NODE MODIFICATION ABHINAV;  Surgeon: Recoveryonly Surgery;  Location: SURGERY PRE-POST PROC UNIT [facility];  Service:    • OTHER CARDIAC SURGERY  1/2016    cardiac ablation   • NEURO DEST FACET L/S W/IG SNGL  4/21/2015    Performed by Reza Tabor at [facility] ORS   • LUMBAR FUSION ANTERIOR  8/21/2012    " "Performed by SUSIE SAWANT at SURGERY [facility] ORS   • ALYSSA BY LAPAROSCOPY  8/29/2010    Performed by SHAYY JOHNS at SURGERY [facility] ORS   • LAMINOTOMY     • OTHER ABDOMINAL SURGERY     • TONSILLECTOMY      tonsillectomy     SOCHX:  reports that she has never smoked. She has never used smokeless tobacco. She reports previous drug use. Frequency: 7.00 times per week. Drug: Marijuana. She reports that she does not drink alcohol.     Objective:   /74 (BP Location: Right arm, Patient Position: Sitting, BP Cuff Size: Adult)   Pulse 74   Temp 37.3 °C (99.1 °F) (Temporal)   Resp 20   Ht 1.651 m (5' 5\")   Wt 113 kg (249 lb)   SpO2 94%   BMI 41.44 kg/m²     Physical Exam  Constitutional:       General: She is not in acute distress.     Appearance: She is well-developed. She is not diaphoretic.   HENT:      Head: Normocephalic and atraumatic.   Pulmonary:      Effort: Pulmonary effort is normal.   Musculoskeletal:      Comments: Right knee  Appearance - +Mild swelling throughout knee   Palpation - +TTP throughout knee and maximally along tibial tuberosity  ROM - ROM limited by pain, able to flex to 90 and about 10 degrees short of full extension  Strength - 5/5 throughout  Neuro - Sensation equal and intact bilaterally  Special testing - No laxity with varus/valgus stress, difficult time relaxing during Lachman's testing and inconclusive, neg Abby's, neg patellar apprehension test   Skin:     General: Skin is warm and dry.      Findings: No erythema.   Neurological:      Mental Status: She is alert.       Assessment/Plan:   Assessment    1. Black stools    2. Acute pain of right knee  - DX-KNEE COMPLETE 4+ RIGHT; Future    3. Fall from ground level  - DX-KNEE COMPLETE 4+ RIGHT; Future    Patient with black stools over the last 2 days.  Though she does have history of hemorrhoids, appearance of blood at this time is much darker than usual.  Hemoccult positive in office, however not grossly " bloody.  Patient having some upper abdominal pain, however this is mild.  Her history is quite concerning for a possible upper GI bleed.  She has been on high doses of steroids over the last 6 weeks.  Also takes ibuprofen and aspirin.  Advised that a GI bleed can be quite a serious thing and reviewed risks and benefits of close monitoring versus ER evaluation.  Patient prefers to be seen in the ER and will be seen across the street at [facility] ER.      For her knee pain, patient had a ground-level fall 10 days ago and pain has been worsening.  She has x-rays in office today which do not show fracture or dislocation.  Advised to work on gentle range of motion exercise, wrap with Ace wrap, and decreased activity level a little the next few days.  As she is feeling better, make progress activity slowly.

## 2021-05-24 ENCOUNTER — HOSPITAL ENCOUNTER (EMERGENCY)
Facility: MEDICAL CENTER | Age: 31
End: 2021-05-24
Attending: EMERGENCY MEDICINE
Payer: MEDICARE

## 2021-05-24 ENCOUNTER — APPOINTMENT (OUTPATIENT)
Dept: RADIOLOGY | Facility: MEDICAL CENTER | Age: 31
End: 2021-05-24
Payer: MEDICARE

## 2021-05-24 ENCOUNTER — APPOINTMENT (OUTPATIENT)
Dept: RADIOLOGY | Facility: MEDICAL CENTER | Age: 31
End: 2021-05-24
Attending: EMERGENCY MEDICINE
Payer: MEDICARE

## 2021-05-24 VITALS
BODY MASS INDEX: 42.82 KG/M2 | HEART RATE: 71 BPM | TEMPERATURE: 97.6 F | DIASTOLIC BLOOD PRESSURE: 74 MMHG | RESPIRATION RATE: 16 BRPM | OXYGEN SATURATION: 95 % | SYSTOLIC BLOOD PRESSURE: 120 MMHG | WEIGHT: 257 LBS | HEIGHT: 65 IN

## 2021-05-24 DIAGNOSIS — K92.2 GASTROINTESTINAL HEMORRHAGE, UNSPECIFIED GASTROINTESTINAL HEMORRHAGE TYPE: ICD-10-CM

## 2021-05-24 DIAGNOSIS — R10.9 ACUTE ABDOMINAL PAIN: ICD-10-CM

## 2021-05-24 LAB
ABO GROUP BLD: NORMAL
ALBUMIN SERPL BCP-MCNC: 4 G/DL (ref 3.2–4.9)
ALBUMIN/GLOB SERPL: 2 G/DL
ALP SERPL-CCNC: 88 U/L (ref 30–99)
ALT SERPL-CCNC: 18 U/L (ref 2–50)
ANION GAP SERPL CALC-SCNC: 10 MMOL/L (ref 7–16)
AST SERPL-CCNC: 18 U/L (ref 12–45)
BASOPHILS # BLD AUTO: 0.4 % (ref 0–1.8)
BASOPHILS # BLD: 0.02 K/UL (ref 0–0.12)
BILIRUB SERPL-MCNC: 0.4 MG/DL (ref 0.1–1.5)
BLD GP AB SCN SERPL QL: NORMAL
BUN SERPL-MCNC: 7 MG/DL (ref 8–22)
CALCIUM SERPL-MCNC: 9.2 MG/DL (ref 8.5–10.5)
CHLORIDE SERPL-SCNC: 108 MMOL/L (ref 96–112)
CO2 SERPL-SCNC: 22 MMOL/L (ref 20–33)
CREAT SERPL-MCNC: 0.78 MG/DL (ref 0.5–1.4)
EOSINOPHIL # BLD AUTO: 0.1 K/UL (ref 0–0.51)
EOSINOPHIL NFR BLD: 2.2 % (ref 0–6.9)
ERYTHROCYTE [DISTWIDTH] IN BLOOD BY AUTOMATED COUNT: 51.7 FL (ref 35.9–50)
GLOBULIN SER CALC-MCNC: 2 G/DL (ref 1.9–3.5)
GLUCOSE SERPL-MCNC: 102 MG/DL (ref 65–99)
HCT VFR BLD AUTO: 40.2 % (ref 37–47)
HGB BLD-MCNC: 12.6 G/DL (ref 12–16)
IMM GRANULOCYTES # BLD AUTO: 0.04 K/UL (ref 0–0.11)
IMM GRANULOCYTES NFR BLD AUTO: 0.9 % (ref 0–0.9)
LIPASE SERPL-CCNC: 34 U/L (ref 11–82)
LYMPHOCYTES # BLD AUTO: 1.28 K/UL (ref 1–4.8)
LYMPHOCYTES NFR BLD: 27.9 % (ref 22–41)
MCH RBC QN AUTO: 29 PG (ref 27–33)
MCHC RBC AUTO-ENTMCNC: 31.3 G/DL (ref 33.6–35)
MCV RBC AUTO: 92.6 FL (ref 81.4–97.8)
MONOCYTES # BLD AUTO: 0.41 K/UL (ref 0–0.85)
MONOCYTES NFR BLD AUTO: 9 % (ref 0–13.4)
NEUTROPHILS # BLD AUTO: 2.73 K/UL (ref 2–7.15)
NEUTROPHILS NFR BLD: 59.6 % (ref 44–72)
NRBC # BLD AUTO: 0 K/UL
NRBC BLD-RTO: 0 /100 WBC
PLATELET # BLD AUTO: 200 K/UL (ref 164–446)
PMV BLD AUTO: 12.2 FL (ref 9–12.9)
POTASSIUM SERPL-SCNC: 4 MMOL/L (ref 3.6–5.5)
PROT SERPL-MCNC: 6 G/DL (ref 6–8.2)
RBC # BLD AUTO: 4.34 M/UL (ref 4.2–5.4)
RH BLD: NORMAL
SODIUM SERPL-SCNC: 140 MMOL/L (ref 135–145)
WBC # BLD AUTO: 4.6 K/UL (ref 4.8–10.8)

## 2021-05-24 PROCEDURE — 96375 TX/PRO/DX INJ NEW DRUG ADDON: CPT

## 2021-05-24 PROCEDURE — 700111 HCHG RX REV CODE 636 W/ 250 OVERRIDE (IP): Performed by: EMERGENCY MEDICINE

## 2021-05-24 PROCEDURE — 99285 EMERGENCY DEPT VISIT HI MDM: CPT

## 2021-05-24 PROCEDURE — 700105 HCHG RX REV CODE 258: Performed by: EMERGENCY MEDICINE

## 2021-05-24 PROCEDURE — 86901 BLOOD TYPING SEROLOGIC RH(D): CPT

## 2021-05-24 PROCEDURE — C9113 INJ PANTOPRAZOLE SODIUM, VIA: HCPCS | Performed by: EMERGENCY MEDICINE

## 2021-05-24 PROCEDURE — 86850 RBC ANTIBODY SCREEN: CPT

## 2021-05-24 PROCEDURE — 80053 COMPREHEN METABOLIC PANEL: CPT

## 2021-05-24 PROCEDURE — 96365 THER/PROPH/DIAG IV INF INIT: CPT

## 2021-05-24 PROCEDURE — 86900 BLOOD TYPING SEROLOGIC ABO: CPT

## 2021-05-24 PROCEDURE — 85025 COMPLETE CBC W/AUTO DIFF WBC: CPT

## 2021-05-24 PROCEDURE — 83690 ASSAY OF LIPASE: CPT

## 2021-05-24 PROCEDURE — 99283 EMERGENCY DEPT VISIT LOW MDM: CPT | Performed by: STUDENT IN AN ORGANIZED HEALTH CARE EDUCATION/TRAINING PROGRAM

## 2021-05-24 RX ORDER — MORPHINE SULFATE 4 MG/ML
4 INJECTION, SOLUTION INTRAMUSCULAR; INTRAVENOUS ONCE
Status: COMPLETED | OUTPATIENT
Start: 2021-05-24 | End: 2021-05-24

## 2021-05-24 RX ORDER — ONDANSETRON 2 MG/ML
4 INJECTION INTRAMUSCULAR; INTRAVENOUS ONCE
Status: COMPLETED | OUTPATIENT
Start: 2021-05-24 | End: 2021-05-24

## 2021-05-24 RX ADMIN — FAMOTIDINE 20 MG: 10 INJECTION INTRAVENOUS at 04:44

## 2021-05-24 RX ADMIN — SODIUM CHLORIDE 80 MG: 9 INJECTION, SOLUTION INTRAVENOUS at 06:05

## 2021-05-24 RX ADMIN — ONDANSETRON 4 MG: 2 INJECTION INTRAMUSCULAR; INTRAVENOUS at 04:45

## 2021-05-24 RX ADMIN — MORPHINE SULFATE 4 MG: 4 INJECTION INTRAVENOUS at 04:45

## 2021-05-24 ASSESSMENT — ENCOUNTER SYMPTOMS
NAUSEA: 0
POLYDIPSIA: 0
VOMITING: 0
DEPRESSION: 0
COUGH: 0
BRUISES/BLEEDS EASILY: 1
SPEECH CHANGE: 0
FOCAL WEAKNESS: 0
HEARTBURN: 0
FALLS: 0
CHILLS: 0
HEADACHES: 0
HEMOPTYSIS: 0
DOUBLE VISION: 0
BLOOD IN STOOL: 1
BLURRED VISION: 0
PALPITATIONS: 0
DIZZINESS: 0
SHORTNESS OF BREATH: 0
FEVER: 0
MYALGIAS: 0
FLANK PAIN: 0
ABDOMINAL PAIN: 1

## 2021-05-24 ASSESSMENT — FIBROSIS 4 INDEX: FIB4 SCORE: 0.69

## 2021-05-24 NOTE — CONSULTS
Hospital Medicine Consultation    Date of Service  5/24/2021    Referring Physician  Jimmy Abbott M.D.    Consulting Physician  Bright Gamez M.D.    Reason for Consultation  Bloody stool, abdominal pain    History of Presenting Illness  30 y.o. female who presented 5/24/2021 with worsening abdominal pain with reported bloody stool prior to arrival.  Patient has multiple ER visits for similar complaints, however was never admitted to hospital.  She is dependent on ibuprofen, which has been advised to discontinue previously multiple times due to concerns GI bleed.  She is followed by GI consultants and previously had extensive work-up with colonoscopy and EGD per patient.    Upon chart review, hemoglobin 12.5 on 5/23, repeat hemoglobin today 5/24 was 12.6, no gross bleeding noted in ER per ER provider.  She had negative occult blood test on 5/13, 4/24, 4/15, 2/8 -when she presented with similar complaints.  CT AP was completed in ER which showed no acute etiology or bleeding source.  She denies nausea vomiting, dehydration or PO intolerance.  Over the past 24 hours observation in ER, no evidence of hemodynamic instability, no hypotension, no tachycardia and no GI bleed seen.  I was consulted to evaluate for GI bleed.    Review of Systems  Review of Systems   Constitutional: Negative for chills, fever and malaise/fatigue.   HENT: Negative for hearing loss and tinnitus.    Eyes: Negative for blurred vision and double vision.   Respiratory: Negative for cough, hemoptysis and shortness of breath.    Cardiovascular: Negative for chest pain and palpitations.   Gastrointestinal: Positive for abdominal pain and blood in stool. Negative for heartburn, nausea and vomiting.   Genitourinary: Negative for flank pain.   Musculoskeletal: Negative for falls and myalgias.   Skin: Negative for itching and rash.   Neurological: Negative for dizziness, speech change, focal weakness and headaches.   Endo/Heme/Allergies: Negative for  environmental allergies and polydipsia. Bruises/bleeds easily.   Psychiatric/Behavioral: Negative for depression and suicidal ideas.   All other systems reviewed and are negative.      Past Medical History   has a past medical history of Abdominal pain, Anginal syndrome, Apnea, sleep, Arrhythmia, Arthritis, ASTHMA, Atrial fibrillation (HCC), Back pain, Borderline personality disorder ([facility]), Breath shortness, Bronchitis, Cardiac arrhythmia, Chickenpox, Chronic UTI (9/18/2010), Cough, Daytime sleepiness, Dental disorder (03/08/2021), Depression, Diabetes ([facility]), Diarrhea, Disorder of thyroid, Fall, Fatigue, Frequent headaches, Gasping for breath, Gynecological disorder, Headache(784.0), Heart burn, Heart murmur, History of falling, Indigestion, Migraine, 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[facility]), 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    Surgical History   has a past surgical history that includes neuro dest facet l/s w/ig sngl (4/21/2015); recovery (1/27/2016); delmar by laparoscopy (8/29/2010); lumbar fusion anterior (8/21/2012); other cardiac surgery (1/2016); tonsillectomy; bowel stimulator insertion (7/13/2016); gastroscopy with balloon dilatation (N/A, 1/4/2017); muscle biopsy (Right, 1/26/2017); other abdominal surgery; laminotomy; and bowel stimulator insertion (3/10/2021).    Family History  family history includes Genitourinary () Problems in her sister; Heart Disease in her maternal grandmother and mother; Hypertension in her maternal grandmother, maternal uncle, and mother; No Known Problems in her sister; Other in her mother and sister;  Sleep Apnea in her mother.    Social History   reports that she has never smoked. She has never used smokeless tobacco. She reports previous drug use. Frequency: 7.00 times per week. Drug: Marijuana. She reports that she does not drink alcohol.    Medications  Prior to Admission Medications   Prescriptions Last Dose Informant Patient Reported? Taking?   Dulaglutide (TRULICITY) 1.5 MG/0.5ML Solution Pen-injector  Patient No No   Sig: Inject 0.5 mL under the skin every 7 days.   Patient taking differently: Inject 0.5 mL under the skin every Friday.   Melatonin 10 MG Tab  Patient Yes No   Sig: Take 10 mg by mouth every bedtime.   OXcarbazepine (TRILEPTAL) 300 MG Tab  Patient No No   Sig: Take 1 tablet by mouth 2 times a day.   albuterol 108 (90 Base) MCG/ACT Aero Soln inhalation aerosol  Patient No No   Sig: Inhale 2 Puffs every 6 hours as needed for Shortness of Breath.   aspirin 81 MG EC tablet  Patient Yes No   Sig: Take 81 mg by mouth every morning.   baclofen (LIORESAL) 10 MG Tab  Patient No No   Sig: Take 1 tablet by mouth 3 times a day.   busPIRone (BUSPAR) 10 MG Tab tablet  Patient Yes No   Sig: Take 10 mg by mouth 3 times a day.   ferrous sulfate 325 (65 Fe) MG tablet  Patient No No   Sig: Take 1 tablet by mouth every morning with breakfast.   fluoxetine (PROZAC) 40 MG capsule  Patient No No   Sig: Take 1 capsule by mouth every day.   fluticasone-salmeterol (ADVAIR) 250-50 MCG/DOSE AEROSOL POWDER, BREATH ACTIVATED   No No   Sig: Inhale 1 Puff 2 times a day for 60 days.   ibuprofen (MOTRIN) 800 MG Tab   No No   Sig: Take 1 tablet by mouth every 8 hours as needed for Moderate Pain. Take with food   ipratropium-albuterol (DUONEB) 0.5-2.5 (3) MG/3ML nebulizer solution  Patient No No   Sig: Take 3 mL by nebulization every four hours as needed for Shortness of Breath. Nebulizer   ivabradine (CORLANOR) 7.5 MG Tab tablet  Patient No No   Sig: Take 1 tablet by mouth 2 times a day, with meals.   levothyroxine  "(SYNTHROID) 100 MCG Tab  Patient Yes No   Sig: Take 100 mcg by mouth Every morning on an empty stomach.   methocarbamol (ROBAXIN) 750 MG Tab   No No   Sig: Take 1 tablet by mouth 4 times a day. As needed for muscle spasm   metoprolol SR (TOPROL XL) 25 MG TABLET SR 24 HR  Patient No No   Sig: Take 0.5 Tablets by mouth every evening.   Patient taking differently: Take 12.5 mg by mouth every evening. 0.5 tablet = 12.5 mg   mycophenolate (CELLCEPT) 500 MG tablet  Patient No No   Sig: Take 2 Tablets by mouth 2 times a day.   Patient taking differently: Take 500 mg by mouth 2 times a day. 500mg twice daily   omeprazole (PRILOSEC) 40 MG delayed-release capsule   No No   Sig: Take 1 capsule by mouth every day.   potassium chloride SA (KDUR) 20 MEQ Tab CR  Patient Yes No   Sig: Take 40 mEq by mouth 2 times a day. 2 tablets = 40 mEq.   pregabalin (LYRICA) 150 MG Cap   Yes No   Sig: Take 300 mg by mouth 2 times a day.   ziprasidone (GEODON) 40 MG Cap  Patient No No   Sig: Take 1 tablet by mouth twice a day   Patient taking differently: Take 40 mg by mouth 2 times a day.      Facility-Administered Medications: None       Allergies  Allergies   Allergen Reactions   • Depakote [Divalproex Sodium] Unspecified     Muscle spasms/muscle pain and weakness     • Montelukast [Singulair]      Cardiac effusion   • Amitriptyline Unspecified     Headaches     • Aripiprazole [Abilify] Unspecified     Headaches/muscle twitching     • Clindamycin Nausea     Even with food     • Flagyl [Metronidazole Hcl] Unspecified     \"eye problems\"     • Flomax [Tamsulosin Hydrochloride] Swelling   • Levaquin Unspecified     Severe muscle cramps in legs  RXN=unknown   • Metformin Unspecified     Increased lactic acid      • Tamsulosin Swelling     Swelling of legs   • Tape Rash     Tears skin off  coban with Tegaderm tape ok intermittently  RXN=ongoing   • Vancomycin Itching     Pt becomes flushed in face and chest.   RXN=7/10/16   • Wound Dressing Adhesive " "Hives     By pt report   • Ampicillin Rash     Pt reports that she received a rash    • Ciprofloxacin Rash         • Keflex Rash     Pt states she gets a rash with this medication  Tolerates ceftriaxone  Can take with Benadryl   • Levofloxacin Unspecified     Leg muscle cramps   • Metronidazole Rash     \"Vision problems\"   • Penicillins Hives     Can take with Benadryl   • Sulfa Drugs Itching and Myalgia     Muscle pain and weakness   • Valproic Acid Rash     .       Physical Exam  Temp:  [36.4 °C (97.5 °F)-36.6 °C (97.8 °F)] 36.6 °C (97.8 °F)  Pulse:  [65-82] 71  Resp:  [12-18] 18  BP: ()/(55-79) 120/74  SpO2:  [92 %-96 %] 95 %    Physical Exam  Constitutional:       General: She is not in acute distress.     Appearance: She is obese. She is not ill-appearing, toxic-appearing or diaphoretic.   HENT:      Head: Normocephalic and atraumatic.      Nose: Nose normal.      Mouth/Throat:      Mouth: Mucous membranes are dry.   Eyes:      General: No scleral icterus.     Extraocular Movements: Extraocular movements intact.   Cardiovascular:      Rate and Rhythm: Normal rate and regular rhythm.      Pulses: Normal pulses.      Heart sounds: No friction rub.   Pulmonary:      Effort: Pulmonary effort is normal. No respiratory distress.      Breath sounds: Normal breath sounds. No wheezing or rales.   Abdominal:      General: Bowel sounds are normal. There is no distension.      Palpations: Abdomen is soft.      Tenderness: There is no abdominal tenderness. There is no right CVA tenderness, left CVA tenderness, guarding or rebound.   Musculoskeletal:         General: No swelling or tenderness. Normal range of motion.      Cervical back: Normal range of motion and neck supple. No tenderness.   Skin:     General: Skin is warm and dry.      Capillary Refill: Capillary refill takes 2 to 3 seconds.   Neurological:      General: No focal deficit present.      Mental Status: She is alert and oriented to person, place, and " time.      Motor: No weakness.   Psychiatric:         Mood and Affect: Mood normal.         Fluids      Laboratory  Recent Labs     05/23/21  1518 05/24/21  0430   WBC 4.6* 4.6*   RBC 4.28 4.34   HEMOGLOBIN 12.5 12.6   HEMATOCRIT 40.1 40.2   MCV 93.7 92.6   MCH 29.2 29.0   MCHC 31.2* 31.3*   RDW 52.2* 51.7*   PLATELETCT 164 200   MPV 11.8 12.2     Recent Labs     05/23/21  1518 05/24/21  0430   SODIUM 141 140   POTASSIUM 4.4 4.0   CHLORIDE 110 108   CO2 21 22   GLUCOSE 97 102*   BUN 9 7*   CREATININE 0.65 0.78   CALCIUM 9.2 9.2                     Imaging  No orders to display     There are no questions and answers to display.       Assessment/Plan  * GIB (gastrointestinal bleeding)  Assessment & Plan  Reported to have GI bleed  No further GI bleed seen since ER arrival  Hemoccult blood negative multiple times previously, not seen this during this ER visit  Hemoglobin repeated twice-12.5>12.6  BUN/Cr 7 / 0.78  INR 0.98 on 4/23  CT AP 5/23: no acute etiology, no source bleeding seen    No tachycardia, no hypotension, no evidence of hemodynamic instability and no GIB seen since ER presentation  Advised cessation of NSAID product-patient expressed understanding  GI consultants (Dr. Carmona) previously consulted by ERP --noted outpatient follow-up    I requested RN case manager to arrange for outpatient appointment at [facility] ASAP --communicated with patient the importance of NSAID cessation and to follow-up with GI consultants ASAP    AP (abdominal pain)  Assessment & Plan  Chronic  CT AP 5/23: no acute etiology  Benign abdominal examination  Advised cessation of NSAID  Provided 3days script of oxycodone 5mg PO q12hrs as needed x6tabs, no refill  Patient will f/u with [facility] as arranged by RN case manager    Thank you for consulting internal medicine in coordinating care for this patient.

## 2021-05-24 NOTE — ED NOTES
DC home with written and verbal instructions regarding f/u, activity and RX. All questions answered. Verbalized understanding, WC out with all belongings.

## 2021-05-24 NOTE — ASSESSMENT & PLAN NOTE
Chronic  CT AP 5/23: no acute etiology  Benign abdominal examination  Advised cessation of NSAID  Provided 3days script of oxycodone 5mg PO q12hrs as needed x6tabs, no refill  Patient will f/u with [facility] as arranged by RN case manager

## 2021-05-24 NOTE — ED NOTES
Per admitting MD, patient being discharged therefore no covid swab collected. Will still give protonix. Dr. Gamez will write Rx for patient.

## 2021-05-24 NOTE — ED NOTES
Pt discharged with one prescription to be picked up at pharmacy. Pt verbalized understanding of discharge education as well as signs and symtpoms for which to return to the ED. Pt brought to lobby via wheelchair to obtain transport home via family.

## 2021-05-24 NOTE — DISCHARGE INSTRUCTIONS
Return to the emergency department in 24 hours for recheck if you are still having pain/  Return sooner for worsening pain fever or vomiting.  Follow-up with your doctor.  Follow-up with your GI doctor as well.  Drink plenty fluids.  Stop taking ibuprofen.  Take your last dose of steroids but the no additional steroids.  Minimize your Robaxin.  Take antacid as prescribed.

## 2021-05-24 NOTE — ED TRIAGE NOTES
Kristin Balderrama  30 y.o. female  Chief Complaint   Patient presents with   • Abdominal Pain   • Bloody Stools     BIBA from home. Pt discharged from [facility] ED today at 2000 for same complaint as above. Per discharge instructions, pt to come back if pain increased. Pain has worsened, 10/10 constant stabbing LUQ and epigastric pain and stools are still bloody. Diagnosed with possible ulcer and discharged with prilosec.    Vitals:    05/24/21 0042   BP: 138/70   Pulse: 73   Resp: 18   Temp: 36.6 °C (97.8 °F)   SpO2: 95%        Pt to triage in WC, mask in place, in obvious discomfort.  Pt educated on triage process. Pt encouraged to alert staff for any changes.

## 2021-05-24 NOTE — ED NOTES
"Pt in gown and on monitor. Agree with triage, pt reports abd pain \"everywhere\" now, worse to periumbilical region, reports feeling \"bloated\". Reports seeing blood when wiping as well as \"black flakes\". Denies recent vomiting or fevers.   "

## 2021-05-24 NOTE — ED PROVIDER NOTES
ED Provider Note    Scribed for Jimmy Abbott M.D. by Taiwo Zuñiga. 5/24/2021  3:31 AM    Primary care provider: Torres Brody M.D.  Means of arrival: EMS  History obtained from: Patient  History limited by: None    CHIEF COMPLAINT  Chief Complaint   Patient presents with   • Abdominal Pain   • Bloody Stools       HPI  Kristin Balderrama is a 30 y.o. female who presents to the Emergency Department via EMS for evaluation of worsening abdominal pain onset several days prior to arrival. She was seen here earlier today for blood in stool, and reports that the pain has been worsening. She has continued to have blood in the stool. Her ED return precautions included worsening pain, which prompted her to come back. No vomiting blood. She has been taking 800 mg Ibuprofen every 8 hours for the last week in addition to daily aspirin. She's had an endoscopy before and was diagnosed with an ulcer.      REVIEW OF SYSTEMS  Pertinent positives include: abdominal pain and bloody stool. Pertinent negatives include: no vomiting blood. See history of present illness. All other systems are negative.     PAST MEDICAL HISTORY   has a past medical history of Abdominal pain, Anginal syndrome, Apnea, sleep, Arrhythmia, Arthritis, ASTHMA, Atrial fibrillation (HCC), Back pain, Borderline personality disorder (HCC), Breath shortness, Bronchitis, Cardiac arrhythmia, Chickenpox, Chronic UTI (9/18/2010), Cough, Daytime sleepiness, Dental disorder (03/08/2021), Depression, Diabetes (HCC), Diarrhea, Disorder of thyroid, Fall, Fatigue, Frequent headaches, Gasping for breath, Gynecological disorder, Headache(784.0), Heart burn, Heart murmur, History of falling, Indigestion, Migraine, Mitochondrial disease (HCC), 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HCC), Nausea, Obesity, Other fatigue (6/29/2020), Pain (08-15-12), Painful joint, PCOS (polycystic ovarian syndrome), Pneumonia (2012 12/2020), Psychosis ([facility]), Ringing in ears, Scoliosis, Shortness of  breath, Sinus tachycardia (10/31/2013), Sleep apnea, Snoring, Tonsillitis, 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HCC), Tuberculosis, Urinary bladder disorder, Urinary incontinence, Weakness, and Wears glasses.    SURGICAL HISTORY   has a past surgical history that includes neuro dest facet l/s w/ig sngl (4/21/2015); recovery (1/27/2016); delmar by laparoscopy (8/29/2010); lumbar fusion anterior (8/21/2012); other cardiac surgery (1/2016); tonsillectomy; bowel stimulator insertion (7/13/2016); gastroscopy with balloon dilatation (N/A, 1/4/2017); muscle biopsy (Right, 1/26/2017); other abdominal surgery; laminotomy; and bowel stimulator insertion (3/10/2021).    SOCIAL HISTORY  Social History     Tobacco Use   • Smoking status: Never Smoker   • Smokeless tobacco: Never Used   Vaping Use   • Vaping Use: Never used   Substance Use Topics   • Alcohol use: No     Alcohol/week: 0.0 oz   • Drug use: Not Currently     Frequency: 7.0 times per week     Types: Marijuana      Social History     Substance and Sexual Activity   Drug Use Not Currently   • Frequency: 7.0 times per week   • Types: Marijuana       FAMILY HISTORY  Family History   Problem Relation Age of Onset   • Hypertension Mother    • Sleep Apnea Mother    • Heart Disease Mother    • Other Mother         hypothryod   • Hypertension Maternal Uncle    • Heart Disease Maternal Grandmother    • Hypertension Maternal Grandmother    • No Known Problems Sister    • Other Sister         Narcolepsy;fibromyalsia;bone;nerve   • Genitourinary () Problems Sister         endometriosis       CURRENT MEDICATIONS  Home Medications     Reviewed by Radha Douglass R.N. (Registered Nurse) on 05/24/21 at 0106  Med List Status: Partial   Medication Last Dose Status   albuterol 108 (90 Base) MCG/ACT Aero Soln inhalation aerosol  Active   aspirin 81 MG EC tablet  Active   baclofen (LIORESAL) 10 MG Tab  Active   busPIRone (BUSPAR) 10 MG Tab tablet  Active   Dulaglutide (TRULICITY) 1.5 MG/0.5ML  "Solution Pen-injector  Active   ferrous sulfate 325 (65 Fe) MG tablet  Active   fluoxetine (PROZAC) 40 MG capsule  Active   fluticasone-salmeterol (ADVAIR) 250-50 MCG/DOSE AEROSOL POWDER, BREATH ACTIVATED  Active   ibuprofen (MOTRIN) 800 MG Tab  Active   ipratropium-albuterol (DUONEB) 0.5-2.5 (3) MG/3ML nebulizer solution  Active   ivabradine (CORLANOR) 7.5 MG Tab tablet  Active   levothyroxine (SYNTHROID) 100 MCG Tab  Active   Melatonin 10 MG Tab  Active   methocarbamol (ROBAXIN) 750 MG Tab  Active   metoprolol SR (TOPROL XL) 25 MG TABLET SR 24 HR  Active   mycophenolate (CELLCEPT) 500 MG tablet  Active   omeprazole (PRILOSEC) 40 MG delayed-release capsule  Active   OXcarbazepine (TRILEPTAL) 300 MG Tab  Active   potassium chloride SA (KDUR) 20 MEQ Tab CR  Active   pregabalin (LYRICA) 150 MG Cap  Active   ziprasidone (GEODON) 40 MG Cap  Active                ALLERGIES  Allergies   Allergen Reactions   • Depakote [Divalproex Sodium] Unspecified     Muscle spasms/muscle pain and weakness     • Montelukast [Singulair]      Cardiac effusion   • Amitriptyline Unspecified     Headaches     • Aripiprazole [Abilify] Unspecified     Headaches/muscle twitching     • Clindamycin Nausea     Even with food     • Flagyl [Metronidazole Hcl] Unspecified     \"eye problems\"     • Flomax [Tamsulosin Hydrochloride] Swelling   • Levaquin Unspecified     Severe muscle cramps in legs  RXN=unknown   • Metformin Unspecified     Increased lactic acid      • Tamsulosin Swelling     Swelling of legs   • Tape Rash     Tears skin off  coban with Tegaderm tape ok intermittently  RXN=ongoing   • Vancomycin Itching     Pt becomes flushed in face and chest.   RXN=7/10/16   • Wound Dressing Adhesive Hives     By pt report   • Ampicillin Rash     Pt reports that she received a rash    • Ciprofloxacin Rash         • Keflex Rash     Pt states she gets a rash with this medication  Tolerates ceftriaxone  Can take with Benadryl   • Levofloxacin Unspecified " "    Leg muscle cramps   • Metronidazole Rash     \"Vision problems\"   • Penicillins Hives     Can take with Benadryl   • Sulfa Drugs Itching and Myalgia     Muscle pain and weakness   • Valproic Acid Rash     .       PHYSICAL EXAM  VITAL SIGNS: /70   Pulse 73   Temp 36.6 °C (97.8 °F) (Temporal)   Resp 18   Ht 1.651 m (5' 5\")   Wt 117 kg (257 lb)   SpO2 95%   BMI 42.77 kg/m²     Constitutional:  Uncomfortable in appearance  HENT: No signs of trauma, Bilateral external ears normal, Nose normal. Uvula midline.   Eyes: Pupils are equal and reactive, Conjunctiva normal, Non-icteric.   Neck: Normal range of motion, No tenderness, Supple, No stridor.   Lymphatic: No lymphadenopathy noted.   Cardiovascular: Regular rate and rhythm, no murmurs.   Thorax & Lungs: Normal breath sounds, No respiratory distress, No wheezing, No chest tenderness.   Abdomen:  Soft, Mild diffuse tenderness to palpation, No peritoneal signs, No masses, No pulsatile masses.   Skin: Warm, Dry, No erythema, No rash.   Back: No bony tenderness, No CVA tenderness.   Extremities: Intact distal pulses, No edema, No tenderness, No cyanosis.  Musculoskeletal: Good range of motion in all major joints. No tenderness to palpation or major deformities noted.   Neurologic: Alert , Normal motor function, Normal sensory function, No focal deficits noted.   Psychiatric: Affect normal, Judgment normal, Mood normal.       DIAGNOSTIC STUDIES / PROCEDURES    LABS  Labs Reviewed   CBC WITH DIFFERENTIAL - Abnormal; Notable for the following components:       Result Value    WBC 4.6 (*)     MCHC 31.3 (*)     RDW 51.7 (*)     All other components within normal limits    Narrative:     Indicate which anticoagulants the patient is on:->UNKNOWN   COMP METABOLIC PANEL - Abnormal; Notable for the following components:    Glucose 102 (*)     Bun 7 (*)     All other components within normal limits    Narrative:     Indicate which anticoagulants the patient is " on:->UNKNOWN   COD (ADULT)   LIPASE    Narrative:     Indicate which anticoagulants the patient is on:->UNKNOWN   ESTIMATED GFR    Narrative:     Indicate which anticoagulants the patient is on:->UNKNOWN   SARS-COV-2, PCR (IN-HOUSE)      All labs reviewed by me.    RADIOLOGY  No orders to display     The radiologist's interpretation of all radiological studies have been reviewed by me.    COURSE & MEDICAL DECISION MAKING  Nursing notes, VS, PMSFHx reviewed in chart.    30 y.o. female p/w chief complaint of abdominal pain and bloody stools.    3:31 AM Patient seen and examined at bedside. Performed rectal exam with RN chaperone present. Patient treated with morphine, Zofran, and Pepcid.    I verified that the patient was wearing a mask and I was wearing appropriate PPE every time I entered the room. The patient's mask was on the patient at all times during my encounter except for a brief view of the oropharynx.     The differential diagnoses include but are not limited to:   HCG yesterday negative  HBG yesterday was 12.5, no significant change at this time  Tachycardic, normotensive, no e/o hemorraghic shock.  Given empiric PPI for PUD.  CBC w/o acute blood loss anemia, no e/o thrombocytopenia.  BMP w/o uremia, unremarkable electrolyte abnormalities. Counseled regarding using 800 mg Ibuprofen TID with prior ulcer. Pt reevaluated by me with persistent pain.    I discussed this case with hospitalist Dr Gamez who feels that pt would be best served by close outpt follow up given no significant drop in HB and no elevation in BUN and no melena on rectal exam. Pt has stopped offending agent and agrees to return if fever develops, pain significantly worsens or vomiting blood begins.      On my reevaluation I discussed plan for close follow up with GI, pt agrees to call GI today for follow up planning.       FINAL IMPRESSION  1. Gastrointestinal hemorrhage, unspecified gastrointestinal hemorrhage type    2. Acute abdominal  pain          ITaiwo (Scribe), am scribing for, and in the presence of, Jimmy Abbott M.D..    Electronically signed by: Taiwo Zuñiga (Scribe), 5/24/2021    Jimmy RODRIGUEZ M.D. personally performed the services described in this documentation, as scribed by Taiwo Zuñiga in my presence, and it is both accurate and complete.    The note accurately reflects work and decisions made by me.  Jimmy Abbott M.D.  5/24/2021  6:31 AM

## 2021-05-24 NOTE — ASSESSMENT & PLAN NOTE
Reported to have GI bleed  No further GI bleed seen since ER arrival  Hemoccult blood negative multiple times previously, not seen this during this ER visit  Hemoglobin repeated twice-12.5>12.6  BUN/Cr 7 / 0.78  INR 0.98 on 4/23  CT AP 5/23: no acute etiology, no source bleeding seen    No tachycardia, no hypotension, no evidence of hemodynamic instability and no GIB seen since ER presentation  Advised cessation of NSAID product-patient expressed understanding  [facility] (Dr. Carmona) previously consulted by ERP --noted outpatient follow-up    I requested RN case manager to arrange for outpatient appointment at [facility] ASAP --communicated with patient the importance of NSAID cessation and to follow-up with [facility] ASAP

## 2021-05-25 ENCOUNTER — APPOINTMENT (OUTPATIENT)
Dept: RADIOLOGY | Facility: MEDICAL CENTER | Age: 31
End: 2021-05-25
Attending: EMERGENCY MEDICINE
Payer: MEDICARE

## 2021-05-25 ENCOUNTER — HOSPITAL ENCOUNTER (EMERGENCY)
Facility: MEDICAL CENTER | Age: 31
End: 2021-05-25
Attending: EMERGENCY MEDICINE
Payer: MEDICARE

## 2021-05-25 VITALS
OXYGEN SATURATION: 93 % | HEIGHT: 65 IN | BODY MASS INDEX: 42.82 KG/M2 | WEIGHT: 257 LBS | RESPIRATION RATE: 18 BRPM | DIASTOLIC BLOOD PRESSURE: 57 MMHG | TEMPERATURE: 98.4 F | HEART RATE: 64 BPM | SYSTOLIC BLOOD PRESSURE: 122 MMHG

## 2021-05-25 DIAGNOSIS — R55 SYNCOPE, UNSPECIFIED SYNCOPE TYPE: ICD-10-CM

## 2021-05-25 LAB
ALBUMIN SERPL BCP-MCNC: 4.1 G/DL (ref 3.2–4.9)
ALBUMIN/GLOB SERPL: 2.3 G/DL
ALP SERPL-CCNC: 84 U/L (ref 30–99)
ALT SERPL-CCNC: 27 U/L (ref 2–50)
ANION GAP SERPL CALC-SCNC: 8 MMOL/L (ref 7–16)
AST SERPL-CCNC: 15 U/L (ref 12–45)
BASOPHILS # BLD AUTO: 0.8 % (ref 0–1.8)
BASOPHILS # BLD: 0.04 K/UL (ref 0–0.12)
BILIRUB SERPL-MCNC: 0.4 MG/DL (ref 0.1–1.5)
BUN SERPL-MCNC: 9 MG/DL (ref 8–22)
CALCIUM SERPL-MCNC: 9 MG/DL (ref 8.5–10.5)
CHLORIDE SERPL-SCNC: 109 MMOL/L (ref 96–112)
CO2 SERPL-SCNC: 23 MMOL/L (ref 20–33)
CREAT SERPL-MCNC: 1.06 MG/DL (ref 0.5–1.4)
EKG IMPRESSION: NORMAL
EOSINOPHIL # BLD AUTO: 0.11 K/UL (ref 0–0.51)
EOSINOPHIL NFR BLD: 2.2 % (ref 0–6.9)
ERYTHROCYTE [DISTWIDTH] IN BLOOD BY AUTOMATED COUNT: 50.3 FL (ref 35.9–50)
GLOBULIN SER CALC-MCNC: 1.8 G/DL (ref 1.9–3.5)
GLUCOSE BLD-MCNC: 100 MG/DL (ref 65–99)
GLUCOSE SERPL-MCNC: 96 MG/DL (ref 65–99)
HCT VFR BLD AUTO: 38.3 % (ref 37–47)
HGB BLD-MCNC: 12.2 G/DL (ref 12–16)
IMM GRANULOCYTES # BLD AUTO: 0.05 K/UL (ref 0–0.11)
IMM GRANULOCYTES NFR BLD AUTO: 1 % (ref 0–0.9)
LYMPHOCYTES # BLD AUTO: 1.58 K/UL (ref 1–4.8)
LYMPHOCYTES NFR BLD: 31 % (ref 22–41)
MCH RBC QN AUTO: 29.4 PG (ref 27–33)
MCHC RBC AUTO-ENTMCNC: 31.9 G/DL (ref 33.6–35)
MCV RBC AUTO: 92.3 FL (ref 81.4–97.8)
MONOCYTES # BLD AUTO: 0.38 K/UL (ref 0–0.85)
MONOCYTES NFR BLD AUTO: 7.5 % (ref 0–13.4)
NEUTROPHILS # BLD AUTO: 2.94 K/UL (ref 2–7.15)
NEUTROPHILS NFR BLD: 57.5 % (ref 44–72)
NRBC # BLD AUTO: 0 K/UL
NRBC BLD-RTO: 0 /100 WBC
PLATELET # BLD AUTO: 174 K/UL (ref 164–446)
PMV BLD AUTO: 11.6 FL (ref 9–12.9)
POTASSIUM SERPL-SCNC: 3.9 MMOL/L (ref 3.6–5.5)
PROT SERPL-MCNC: 5.9 G/DL (ref 6–8.2)
RBC # BLD AUTO: 4.15 M/UL (ref 4.2–5.4)
SODIUM SERPL-SCNC: 140 MMOL/L (ref 135–145)
TROPONIN T SERPL-MCNC: <6 NG/L (ref 6–19)
WBC # BLD AUTO: 5.1 K/UL (ref 4.8–10.8)

## 2021-05-25 PROCEDURE — 700102 HCHG RX REV CODE 250 W/ 637 OVERRIDE(OP): Performed by: EMERGENCY MEDICINE

## 2021-05-25 PROCEDURE — 36415 COLL VENOUS BLD VENIPUNCTURE: CPT

## 2021-05-25 PROCEDURE — A9270 NON-COVERED ITEM OR SERVICE: HCPCS | Performed by: EMERGENCY MEDICINE

## 2021-05-25 PROCEDURE — 82962 GLUCOSE BLOOD TEST: CPT

## 2021-05-25 PROCEDURE — 85025 COMPLETE CBC W/AUTO DIFF WBC: CPT

## 2021-05-25 PROCEDURE — 70450 CT HEAD/BRAIN W/O DYE: CPT | Mod: MG

## 2021-05-25 PROCEDURE — 71045 X-RAY EXAM CHEST 1 VIEW: CPT

## 2021-05-25 PROCEDURE — 84484 ASSAY OF TROPONIN QUANT: CPT

## 2021-05-25 PROCEDURE — 93005 ELECTROCARDIOGRAM TRACING: CPT | Performed by: EMERGENCY MEDICINE

## 2021-05-25 PROCEDURE — 99284 EMERGENCY DEPT VISIT MOD MDM: CPT

## 2021-05-25 PROCEDURE — 93005 ELECTROCARDIOGRAM TRACING: CPT

## 2021-05-25 PROCEDURE — 80053 COMPREHEN METABOLIC PANEL: CPT

## 2021-05-25 RX ORDER — OXYCODONE HYDROCHLORIDE 5 MG/1
5 TABLET ORAL ONCE
Status: COMPLETED | OUTPATIENT
Start: 2021-05-25 | End: 2021-05-25

## 2021-05-25 RX ADMIN — OXYCODONE 5 MG: 5 TABLET ORAL at 18:46

## 2021-05-25 RX ADMIN — LIDOCAINE HYDROCHLORIDE 30 ML: 20 SOLUTION OROPHARYNGEAL at 18:47

## 2021-05-25 ASSESSMENT — ENCOUNTER SYMPTOMS
CHILLS: 0
NAUSEA: 1
SHORTNESS OF BREATH: 0
PALPITATIONS: 1
FEVER: 0
VOMITING: 1
WEAKNESS: 0
DIARRHEA: 1
CONSTIPATION: 0
TINGLING: 0
ABDOMINAL PAIN: 1

## 2021-05-25 ASSESSMENT — PAIN DESCRIPTION - PAIN TYPE
TYPE: ACUTE PAIN
TYPE: ACUTE PAIN

## 2021-05-25 ASSESSMENT — FIBROSIS 4 INDEX: FIB4 SCORE: 0.66

## 2021-05-25 NOTE — PROGRESS NOTES
DALY Ibrahim received an order from [facility] to schedule patient with PCP appointment and GI appointment. Patient has her PCP appointment earlier this week. Patient called GI but was told she would not be able to be scheduled until mid June so she declined the appointment. DALY Ibrahim asked if patient would like to be scheduled at GI still. Patient said yes that she's ok with whatever appointment they schedule her. DALY Ibrahim will text patient with appointment information. Patient greatly appreciated the help.

## 2021-05-25 NOTE — ED TRIAGE NOTES
"To triage via w/c with report of   Chief Complaint   Patient presents with   • Syncope     today in car.   witnessed by family 3-4 minutes at 1300 today   reports felt CP and \"bubbling\" feeling in chest just prior to syncope.    Reports vomited X 3 with coffee ground emesis last night, Dark tar like diarrhea today.  Reports low grade fever at 99 today  Seen and treated for same in this ED for same and told to come back for worsening symptoms  "

## 2021-05-26 NOTE — ED PROVIDER NOTES
"ED Provider Note    Scribed for Ignacia Coon M.D. by Kip Workman. 5/25/2021, 6:20 PM.    Primary care provider: Torres Brody M.D.  Means of arrival: Walk-In  History obtained from: Patient  History limited by: None    CHIEF COMPLAINT  Chief Complaint   Patient presents with   • Syncope     today in car.   witnessed by family 3-4 minutes at 1300 today       HPI  Kristin Balderrama is a 30 y.o. female who presents to the Emergency Department for evaluation of acute syncope onset 1 PM. She was at the dentist today around 1 PM when she had a syncopal episode and when she came back to consciousness she noticed heart palpitations. She reportedly lost consciousness for about 3 to 4 minutes which is what patient's family told her.. Just prior to her syncopal event, she states that she felt a \"bubbly\" feeling in her chest. She additionally notes she had three episodes of diarrhea.  She was evaluated in the emergency department yesterday for concerns of blood in her vomitus, she was ultimately discharged and advised to follow-up with gastroenterology.   She states that she is still having abdominal pain today with nausea, diarrhea, although it is all mostly improved.  She has follow-up with GI on Irene 10. Negative for fever, chills, constipation, dysuria, fatigue, tingling, or weakness. No alleviating or exacerbating factors identified by the patient. The patient has a history of diabetes, atrial fibrillation, anginal syndrome, and asthma.     Patient had echocardiogram in March 2021 which demonstrated normal ejection fraction of 60%, bubble study demonstrated no shunt.        REVIEW OF SYSTEMS  Review of Systems   Constitutional: Negative for chills, fever and malaise/fatigue.   Respiratory: Negative for shortness of breath.    Cardiovascular: Positive for palpitations. Negative for chest pain.   Gastrointestinal: Positive for abdominal pain, diarrhea, nausea and vomiting. Negative for constipation. "   Genitourinary: Negative for dysuria.   Neurological: Negative for tingling and weakness.   All other systems reviewed and are negative.        PAST MEDICAL HISTORY   has a past medical history of Abdominal pain, Anginal syndrome, Apnea, sleep, Arrhythmia, Arthritis, ASTHMA, Atrial fibrillation (HCC), Back pain, Borderline personality disorder ([facility]), Breath shortness, Bronchitis, Cardiac arrhythmia, Chickenpox, Chronic UTI (9/18/2010), Cough, Daytime sleepiness, Dental disorder (03/08/2021), Depression, Diabetes ([facility]), Diarrhea, Disorder of thyroid, Fall, Fatigue, Frequent headaches, Gasping for breath, Gynecological disorder, Headache(784.0), Heart burn, Heart murmur, History of falling, Indigestion, Migraine, 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    SURGICAL HISTORY   has a past surgical history that includes neuro dest facet l/s w/ig sngl (4/21/2015); recovery (1/27/2016); delmar by laparoscopy (8/29/2010); lumbar fusion anterior (8/21/2012); other cardiac surgery (1/2016); tonsillectomy; bowel stimulator insertion (7/13/2016); gastroscopy with balloon dilatation (N/A, 1/4/2017); muscle biopsy (Right, 1/26/2017); other abdominal surgery; laminotomy; and bowel stimulator insertion (3/10/2021).    SOCIAL HISTORY  Social History     Tobacco Use   • Smoking status: Never Smoker   • Smokeless tobacco: Never Used   Vaping Use   • Vaping Use: Never used   Substance Use Topics   • Alcohol use: No     Alcohol/week: 0.0 oz   • Drug use: Not Currently     Frequency: 7.0 times per week     Types: Marijuana       Social History     Substance and Sexual Activity   Drug Use Not Currently   • Frequency: 7.0 times per week   • Types: Marijuana       FAMILY HISTORY  Family History   Problem Relation Age of Onset   • Hypertension Mother    • Sleep Apnea Mother    • Heart Disease Mother    • Other Mother         hypothryod   • Hypertension Maternal Uncle    • Heart Disease Maternal Grandmother    • Hypertension Maternal Grandmother    • No Known Problems Sister    • Other Sister         Narcolepsy;fibromyalsia;bone;nerve   • Genitourinary () Problems Sister         endometriosis       CURRENT MEDICATIONS  Home Medications     Reviewed by Mercy Jordan R.N. (Registered Nurse) on 05/25/21 at 1506  Med List Status: <None>   Medication Last Dose Status   albuterol 108 (90 Base) MCG/ACT Aero Soln inhalation aerosol  Active   aspirin 81 MG EC tablet  Active   baclofen (LIORESAL) 10 MG Tab  Active   busPIRone (BUSPAR) 10 MG Tab tablet  Active   Dulaglutide (TRULICITY) 1.5 MG/0.5ML Solution Pen-injector  Active   ferrous sulfate 325 (65 Fe) MG tablet  Active   fluoxetine (PROZAC) 40 MG capsule  Active   fluticasone-salmeterol (ADVAIR) 250-50 MCG/DOSE AEROSOL POWDER, BREATH ACTIVATED  Active   ibuprofen (MOTRIN) 800 MG Tab  Active   ipratropium-albuterol (DUONEB) 0.5-2.5 (3) MG/3ML nebulizer solution  Active   ivabradine (CORLANOR) 7.5 MG Tab tablet  Active   levothyroxine (SYNTHROID) 100 MCG Tab  Active   Melatonin 10 MG Tab  Active   methocarbamol (ROBAXIN) 750 MG Tab  Active   metoprolol SR (TOPROL XL) 25 MG TABLET SR 24 HR  Active   mycophenolate (CELLCEPT) 500 MG tablet  Active   omeprazole (PRILOSEC) 40 MG delayed-release capsule  Active   OXcarbazepine (TRILEPTAL) 300 MG Tab  Active   potassium chloride SA (KDUR) 20 MEQ Tab CR  Active   pregabalin (LYRICA) 150 MG Cap  Active   ziprasidone (GEODON) 40 MG Cap  Active                ALLERGIES  Allergies   Allergen Reactions   • Depakote [Divalproex Sodium] Unspecified     Muscle  "spasms/muscle pain and weakness     • Montelukast [Singulair]      Cardiac effusion   • Amitriptyline Unspecified     Headaches     • Aripiprazole [Abilify] Unspecified     Headaches/muscle twitching     • Clindamycin Nausea     Even with food     • Flagyl [Metronidazole Hcl] Unspecified     \"eye problems\"     • Flomax [Tamsulosin Hydrochloride] Swelling   • Levaquin Unspecified     Severe muscle cramps in legs  RXN=unknown   • Metformin Unspecified     Increased lactic acid      • Tamsulosin Swelling     Swelling of legs   • Tape Rash     Tears skin off  coban with Tegaderm tape ok intermittently  RXN=ongoing   • Vancomycin Itching     Pt becomes flushed in face and chest.   RXN=7/10/16   • Wound Dressing Adhesive Hives     By pt report   • Ampicillin Rash     Pt reports that she received a rash    • Ciprofloxacin Rash         • Keflex Rash     Pt states she gets a rash with this medication  Tolerates ceftriaxone  Can take with Benadryl   • Levofloxacin Unspecified     Leg muscle cramps   • Metronidazole Rash     \"Vision problems\"   • Penicillins Hives     Can take with Benadryl   • Sulfa Drugs Itching and Myalgia     Muscle pain and weakness   • Valproic Acid Rash     .       PHYSICAL EXAM  VITAL SIGNS: /67   Pulse 74   Temp 36.1 °C (97 °F) (Temporal)   Resp 16   Ht 1.651 m (5' 5\")   Wt 117 kg (257 lb)   SpO2 95%   BMI 42.77 kg/m²   Vitals reviewed by myself.  Physical Exam   Nursing note and vitals reviewed.  Constitutional: Well-developed and well-nourished. No acute distress.  HENT: Head is normocephalic and atraumatic.  Eyes: extra-ocular movements intact  Cardiovascular: regular rate and  regular rhythm. No murmur heard.  Pulmonary/Chest: Breath sounds normal. No wheezes or rales.   Abdominal: Soft and non-tender. No distention.    Musculoskeletal: Extremities exhibit normal range of motion without edema or tenderness.   Neurological: Awake and alert, cranial nerves II through XII intact, no " focal neurologic deficits  Skin: Skin is warm and dry. No rash.           DIAGNOSTIC STUDIES /  LABS  Labs Reviewed   CBC WITH DIFFERENTIAL - Abnormal; Notable for the following components:       Result Value    RBC 4.15 (*)     MCHC 31.9 (*)     RDW 50.3 (*)     Immature Granulocytes 1.00 (*)     All other components within normal limits    Narrative:     Indicate which anticoagulants the patient is on:->UNKNOWN   COMP METABOLIC PANEL - Abnormal; Notable for the following components:    Total Protein 5.9 (*)     Globulin 1.8 (*)     All other components within normal limits    Narrative:     Indicate which anticoagulants the patient is on:->UNKNOWN   TROPONIN    Narrative:     Indicate which anticoagulants the patient is on:->UNKNOWN   ESTIMATED GFR    Narrative:     Indicate which anticoagulants the patient is on:->UNKNOWN       All labs reviewed by me.    EKG Interpretation:  Interpreted by myself    12 Lead EKG interpreted by me to show:  EKG at 4:18 PM: Normal sinus rhythm, heart rate 71, normal axis, normal intervals, , QRS 90, QTc 453, no acute ST-T segment changes, no evidence of acute arrhythmia or ischemia  My impression of this EKG: Does not indicate ischemia or arrhythmia at this time.    RADIOLOGY  CT-HEAD W/O   Final Result      No evidence of acute intracranial process.      DX-CHEST-PORTABLE (1 VIEW)   Final Result      Mild cardiomegaly.        The radiologist's interpretation of all radiological studies have been reviewed by me.        REASSESSMENT    4:19 PM - Ordered for EKG to evaluate the patient.     6:10 PM - Patient was examined at bedside. Ordered for Troponin, CMP, CBC with Differential, CT Head, and DX Chest to further evaluate. I will await lab and radiology results before determining whether interventions are necessary. The patient is agreeable and understanding of my plan of care.     6:27 PM - Patient will be treated with Roxicodone 5 mg and GI cocktail 30 mL.     8:12 PM - Upon  repeat evaluation, the patient is resting in bed with stable vitals. I updated her on her lab and radiology results which show no remarkable or acute results. She states she is feeling improved following medications. I instructed her to follow up with her primary care provider for her symptoms. I informed the patient of my plan for discharge, I provided precautions to return for any new or worsening symptoms. The patient verbalized understanding of discharge precautions and is agreeable to my plan.      COURSE & MEDICAL DECISION MAKING  Nursing notes, VS, PMSFHx reviewed in chart.    Patient is a 30-year-old female who comes in for evaluation of syncopal event.  Differential diagnosis includes vasovagal syncope, orthostatic hypotension, dehydration, electrolyte disturbance, arrhythmia, intracranial pathology.  Diagnostic work-up includes labs, EKG and CT of the head.    Patient's initial vitals are within normal limits, she is neurovascularly intact on exam.  EKG returns demonstrates no evidence of acute arrhythmia or ischemia.  Troponin is within normal limits.  Patient has a heart score of 0 making acute coronary syndrome unlikely.  Labs are otherwise unremarkable.  Chest x-ray and CT of the head demonstrate no acute pathology to explain syncope.  Therefore patient is reassured and advised on importance of following up with PCP.  She is given strict return precautions and discharged in stable condition.    The patient will return for new or worsening symptoms and is stable at the time of discharge.    The patient is referred to a primary physician for blood pressure management, diabetic screening, and for all other preventative health concerns.    DISPOSITION:  Patient will be discharged home in stable condition.    FOLLOW UP:  Torres Brody M.D.  [address]  [facility] [id number] [phone]            FINAL IMPRESSION  1. Syncope, unspecified syncope type          I, Kip Workman (Eva), meggan russell  for, and in the presence of, Ignacia Coon M.D..    Electronically signed by: Kip Workamn (Scribe), 5/25/2021    I, Ignacia Coon M.D. personally performed the services described in this documentation, as scribed by Kip Workman in my presence, and it is both accurate and complete.    C.     The note accurately reflects work and decisions made by me.  Ignacia Coon M.D.  5/25/2021  8:28 PM

## 2021-05-26 NOTE — ED NOTES
Discharge education provided. Discharge paperwork signed by pt. PIV removed, catheter intact.  All questions answered. All belongings with pt. Pt taken to lobby in wheelchair to wait for friend to  and take home.

## 2021-05-26 NOTE — ED NOTES
Patient wheeled to the bathroom. Patient was able to use restroom without assistance. Urine was collected and sent to lab.

## 2021-05-26 NOTE — PROGRESS NOTES
CHW called patient to follow up about recent ER visit and inform her of her GI appointment. Patient did not answer CHW's call. CHW left a VM for patient with CHW's contact information and appointment information.

## 2021-05-26 NOTE — PROGRESS NOTES
CHW called [facility] to schedule patient with appointment. Patient scheduled for June 10th at 10:15

## 2021-05-27 ENCOUNTER — HOSPITAL ENCOUNTER (EMERGENCY)
Facility: MEDICAL CENTER | Age: 31
End: 2021-05-27
Attending: EMERGENCY MEDICINE
Payer: MEDICARE

## 2021-05-27 VITALS — TEMPERATURE: 98.7 F | BODY MASS INDEX: 42.92 KG/M2 | WEIGHT: 257.94 LBS

## 2021-05-27 PROCEDURE — 99283 EMERGENCY DEPT VISIT LOW MDM: CPT

## 2021-05-27 ASSESSMENT — FIBROSIS 4 INDEX: FIB4 SCORE: 0.51

## 2021-05-28 NOTE — ED NOTES
Patient left ama, discussed reasons to stay, she declined.  Patient escorted out in a wheel chair.

## 2021-05-28 NOTE — ED TRIAGE NOTES
Patient comes in stating she does not feel well, seen at [facility] this am and was cleared by them, she was discharged, she called ems shortly after.  Vitals stable, no major distress noted.  Per EMS patient was not verbal on arrival and appeared passed out but was guarding on all tests.  Patient has a history of borderline personality disorder and has been seen multiple times for similar complaints.

## 2021-05-28 NOTE — ED PROVIDER NOTES
ED Provider Note    CHIEF COMPLAINT  Chief Complaint   Patient presents with   • Malaise       HPI  Kristin Balderrama is a 30 y.o. female who presents with chief complaint of malaise.  Patient has multiple medical problems including borderline personality disorder, atrial fibrillation and asthma.  Patient was recently discharged from [facility] for asthma exacerbation.  She received IV metoprolol prednisolone and was sent home on a taper.  She was discharged this morning.  She reports that upon returning home she felt very tired.  Apparently her grandma became concerned for an unclear reason I called 911.  Patient reports feeling very weak and tired. Patient denies any head trauma.  Patient denies any nausea or vomiting.  She denies any fevers or chills.  She denies any cough or ongoing shortness of breath.  Patient denies any focal weakness or numbness.    REVIEW OF SYSTEMS  ROS    See HPI for further details. All other systems are negative.     PAST MEDICAL HISTORY   has a past medical history of Abdominal pain, Anginal syndrome, Apnea, sleep, Arrhythmia, Arthritis, ASTHMA, Atrial fibrillation ([facility]), Back pain, Borderline personality disorder ([facility]), Breath shortness, Bronchitis, Cardiac arrhythmia, Chickenpox, Chronic UTI (9/18/2010), Cough, Daytime sleepiness, Dental disorder (03/08/2021), Depression, Diabetes ([facility]), Diarrhea, Disorder of thyroid, Fall, Fatigue, Frequent headaches, Gasping for breath, Gynecological disorder, Headache(784.0), Heart burn, Heart murmur, History of falling, Indigestion, Migraine, 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    SOCIAL HISTORY  Social History     Tobacco Use   • Smoking status: Never Smoker   • Smokeless tobacco: Never Used   Vaping Use   • Vaping Use: Never used   Substance and Sexual Activity   • Alcohol use: No     Alcohol/week: 0.0 oz   • Drug use: Not Currently     Frequency: 7.0 times per week     Types: Marijuana   • Sexual activity: Not Currently     Birth control/protection: Pill       SURGICAL HISTORY   has a past surgical history that includes neuro dest facet l/s w/ig sngl (4/21/2015); recovery (1/27/2016); delmar by laparoscopy (8/29/2010); lumbar fusion anterior (8/21/2012); other cardiac surgery (1/2016); tonsillectomy; bowel stimulator insertion (7/13/2016); gastroscopy with balloon dilatation (N/A, 1/4/2017); muscle biopsy (Right, 1/26/2017); other abdominal surgery; laminotomy; and bowel stimulator insertion (3/10/2021).    CURRENT MEDICATIONS  Home Medications    **Home medications have not yet been reviewed for this encounter**         ALLERGIES  Allergies   Allergen Reactions   • Depakote [Divalproex Sodium] Unspecified     Muscle spasms/muscle pain and weakness     • Montelukast [Singulair]      Cardiac effusion   • Amitriptyline Unspecified     Headaches     • Aripiprazole [Abilify] Unspecified     Headaches/muscle twitching     • Clindamycin Nausea     Even with food     • Flagyl [Metronidazole Hcl] Unspecified     \"eye problems\"     • Flomax [Tamsulosin Hydrochloride] Swelling   • Levaquin Unspecified     Severe muscle cramps in legs  RXN=unknown   • Metformin Unspecified     Increased lactic acid      • Tamsulosin Swelling     Swelling of legs   • Tape Rash     Tears skin off  coban with Tegaderm tape ok intermittently  RXN=ongoing   • Vancomycin Itching     Pt becomes flushed in face and chest.   RXN=7/10/16   • Wound Dressing Adhesive Hives     By pt report   • Ampicillin Rash     Pt reports that she received a rash    • " "Ciprofloxacin Rash         • Keflex Rash     Pt states she gets a rash with this medication  Tolerates ceftriaxone  Can take with Benadryl   • Levofloxacin Unspecified     Leg muscle cramps   • Metronidazole Rash     \"Vision problems\"   • Penicillins Hives     Can take with Benadryl   • Sulfa Drugs Itching and Myalgia     Muscle pain and weakness   • Valproic Acid Rash     .       PHYSICAL EXAM  Vitals:    05/27/21 1809   Temp: 37.1 °C (98.7 °F)       Physical Exam  Constitutional:       Appearance: She is well-developed.   HENT:      Head: Normocephalic and atraumatic.   Eyes:      Conjunctiva/sclera: Conjunctivae normal.   Cardiovascular:      Rate and Rhythm: Normal rate and regular rhythm.   Pulmonary:      Effort: Pulmonary effort is normal.      Breath sounds: Normal breath sounds.   Abdominal:      General: Bowel sounds are normal. There is no distension.      Palpations: Abdomen is soft.      Tenderness: There is no abdominal tenderness. There is no rebound.   Musculoskeletal:      Cervical back: Normal range of motion and neck supple.   Skin:     General: Skin is warm and dry.      Findings: No rash.   Neurological:      General: No focal deficit present.      Mental Status: She is alert and oriented to person, place, and time.      Comments: Distal and proximal strength 5/5 in upper and lower extremities. Normal gait. No dysmetria. No sensation deficits. No visual field deficits. Cranial nerves intact.      Psychiatric:         Behavior: Behavior normal.           DIAGNOSTIC STUDIES / PROCEDURES        COURSE & MEDICAL DECISION MAKING  Pertinent Labs & Imaging studies reviewed. (See chart for details)    I have examined patient, she does not have any focal findings on exam, I discussed that I was concerned that she has been seen here so many times and assess if she was depressed or felt safe at home.  She reports that she is not depressed and she feels safe.  I told her I was going to get the records from " [facility] and reassess, upon me leaving the room patient absconded; per nurse patient had steady gait.  Patient did not appear intoxicated on my exam       The patient will return for worsening symptoms and is stable at the time of discharge. The patient verbalizes understanding and will comply.    FINAL IMPRESSION  1.  Malaise    Electronically signed by: Torres Can M.D., 5/27/2021 6:46 PM

## 2021-06-02 ENCOUNTER — TELEPHONE (OUTPATIENT)
Dept: CARDIOLOGY | Facility: MEDICAL CENTER | Age: 31
End: 2021-06-02

## 2021-06-02 DIAGNOSIS — F32.5 MAJOR DEPRESSIVE DISORDER WITH SINGLE EPISODE, IN FULL REMISSION (HCC): ICD-10-CM

## 2021-06-02 RX ORDER — FLUOXETINE HYDROCHLORIDE 40 MG/1
40 CAPSULE ORAL DAILY
Qty: 90 CAPSULE | Refills: 0 | Status: SHIPPED | OUTPATIENT
Start: 2021-06-02 | End: 2021-08-24

## 2021-06-02 NOTE — TELEPHONE ENCOUNTER
"I received a call from Lynnette that patient has CP/purple lips and SOB.  Lynnette patched me through and I asked her to call 911 right away.  She said she could not because \"she left them on a bad note and they will not respond anymore.\"  I asked her to get a ride to the ER if she cannot depend on them, but asked her to try.  She said \"ok.\"  "

## 2021-06-02 NOTE — TELEPHONE ENCOUNTER
RT    Patient called and stated that she had an Echo done at [facility] about a month ago. She would like those records given to provider for her upcoming appt.    Thank you,    Lynnette CORRALES

## 2021-06-03 DIAGNOSIS — F25.9 SCHIZOAFFECTIVE DISORDER, UNSPECIFIED TYPE (HCC): ICD-10-CM

## 2021-06-03 RX ORDER — ZIPRASIDONE HYDROCHLORIDE 40 MG/1
CAPSULE ORAL
Qty: 180 CAPSULE | Refills: 0 | Status: SHIPPED | OUTPATIENT
Start: 2021-06-03 | End: 2021-08-24

## 2021-06-03 RX ORDER — OXCARBAZEPINE 300 MG/1
TABLET, FILM COATED ORAL
Qty: 180 TABLET | Refills: 0 | Status: SHIPPED | OUTPATIENT
Start: 2021-06-03 | End: 2021-08-24

## 2021-06-03 NOTE — TELEPHONE ENCOUNTER
Called pt to check in. She went to [facility] ER yesterday and reports that she was told that everything was fine and she was sent home. She is doing okay today but continues to have chest discomfort. We are still waiting for the echo results from [place].       Records request sent again to [facility] for echo.

## 2021-06-04 ENCOUNTER — HOSPITAL ENCOUNTER (INPATIENT)
Facility: MEDICAL CENTER | Age: 31
LOS: 5 days | DRG: 057 | End: 2021-06-10
Attending: EMERGENCY MEDICINE | Admitting: STUDENT IN AN ORGANIZED HEALTH CARE EDUCATION/TRAINING PROGRAM
Payer: MEDICARE

## 2021-06-04 DIAGNOSIS — G37.3 TRANSVERSE MYELITIS (HCC): ICD-10-CM

## 2021-06-04 DIAGNOSIS — H53.8 BLURRED VISION: ICD-10-CM

## 2021-06-04 DIAGNOSIS — R53.1 WEAKNESS: ICD-10-CM

## 2021-06-04 DIAGNOSIS — H46.9 OPTIC NEURITIS: ICD-10-CM

## 2021-06-04 PROBLEM — G91.2 NORMAL PRESSURE HYDROCEPHALUS (HCC): Status: ACTIVE | Noted: 2021-06-04

## 2021-06-04 LAB
ALBUMIN SERPL BCP-MCNC: 4 G/DL (ref 3.2–4.9)
ALBUMIN/GLOB SERPL: 2 G/DL
ALP SERPL-CCNC: 88 U/L (ref 30–99)
ALT SERPL-CCNC: 19 U/L (ref 2–50)
ANION GAP SERPL CALC-SCNC: 9 MMOL/L (ref 7–16)
AST SERPL-CCNC: 17 U/L (ref 12–45)
BASOPHILS # BLD AUTO: 0.7 % (ref 0–1.8)
BASOPHILS # BLD: 0.04 K/UL (ref 0–0.12)
BILIRUB SERPL-MCNC: 0.2 MG/DL (ref 0.1–1.5)
BUN SERPL-MCNC: 12 MG/DL (ref 8–22)
CALCIUM SERPL-MCNC: 8.9 MG/DL (ref 8.5–10.5)
CHLORIDE SERPL-SCNC: 111 MMOL/L (ref 96–112)
CO2 SERPL-SCNC: 21 MMOL/L (ref 20–33)
CREAT SERPL-MCNC: 0.75 MG/DL (ref 0.5–1.4)
EOSINOPHIL # BLD AUTO: 0.12 K/UL (ref 0–0.51)
EOSINOPHIL NFR BLD: 2.1 % (ref 0–6.9)
ERYTHROCYTE [DISTWIDTH] IN BLOOD BY AUTOMATED COUNT: 48.9 FL (ref 35.9–50)
EST. AVERAGE GLUCOSE BLD GHB EST-MCNC: 108 MG/DL
FLUAV RNA SPEC QL NAA+PROBE: NEGATIVE
FLUBV RNA SPEC QL NAA+PROBE: NEGATIVE
GLOBULIN SER CALC-MCNC: 2 G/DL (ref 1.9–3.5)
GLUCOSE SERPL-MCNC: 95 MG/DL (ref 65–99)
HBA1C MFR BLD: 5.4 % (ref 4–5.6)
HCG SERPL QL: NEGATIVE
HCT VFR BLD AUTO: 40.8 % (ref 37–47)
HGB BLD-MCNC: 12.9 G/DL (ref 12–16)
IMM GRANULOCYTES # BLD AUTO: 0.03 K/UL (ref 0–0.11)
IMM GRANULOCYTES NFR BLD AUTO: 0.5 % (ref 0–0.9)
LYMPHOCYTES # BLD AUTO: 1.63 K/UL (ref 1–4.8)
LYMPHOCYTES NFR BLD: 29 % (ref 22–41)
MAGNESIUM SERPL-MCNC: 2 MG/DL (ref 1.5–2.5)
MCH RBC QN AUTO: 29 PG (ref 27–33)
MCHC RBC AUTO-ENTMCNC: 31.6 G/DL (ref 33.6–35)
MCV RBC AUTO: 91.7 FL (ref 81.4–97.8)
MONOCYTES # BLD AUTO: 0.51 K/UL (ref 0–0.85)
MONOCYTES NFR BLD AUTO: 9.1 % (ref 0–13.4)
NEUTROPHILS # BLD AUTO: 3.29 K/UL (ref 2–7.15)
NEUTROPHILS NFR BLD: 58.6 % (ref 44–72)
NRBC # BLD AUTO: 0 K/UL
NRBC BLD-RTO: 0 /100 WBC
PLATELET # BLD AUTO: 209 K/UL (ref 164–446)
PMV BLD AUTO: 11.7 FL (ref 9–12.9)
POTASSIUM SERPL-SCNC: 4.1 MMOL/L (ref 3.6–5.5)
PROT SERPL-MCNC: 6 G/DL (ref 6–8.2)
RBC # BLD AUTO: 4.45 M/UL (ref 4.2–5.4)
RSV RNA SPEC QL NAA+PROBE: NEGATIVE
SARS-COV-2 RNA RESP QL NAA+PROBE: NOTDETECTED
SODIUM SERPL-SCNC: 141 MMOL/L (ref 135–145)
SPECIMEN SOURCE: NORMAL
TSH SERPL DL<=0.005 MIU/L-ACNC: 3.11 UIU/ML (ref 0.38–5.33)
WBC # BLD AUTO: 5.6 K/UL (ref 4.8–10.8)

## 2021-06-04 PROCEDURE — 99285 EMERGENCY DEPT VISIT HI MDM: CPT

## 2021-06-04 PROCEDURE — 700102 HCHG RX REV CODE 250 W/ 637 OVERRIDE(OP): Performed by: STUDENT IN AN ORGANIZED HEALTH CARE EDUCATION/TRAINING PROGRAM

## 2021-06-04 PROCEDURE — 0241U HCHG SARS-COV-2 COVID-19 NFCT DS RESP RNA 4 TRGT MIC: CPT

## 2021-06-04 PROCEDURE — 85025 COMPLETE CBC W/AUTO DIFF WBC: CPT

## 2021-06-04 PROCEDURE — G0378 HOSPITAL OBSERVATION PER HR: HCPCS

## 2021-06-04 PROCEDURE — 96374 THER/PROPH/DIAG INJ IV PUSH: CPT

## 2021-06-04 PROCEDURE — 94760 N-INVAS EAR/PLS OXIMETRY 1: CPT

## 2021-06-04 PROCEDURE — 84443 ASSAY THYROID STIM HORMONE: CPT

## 2021-06-04 PROCEDURE — 700111 HCHG RX REV CODE 636 W/ 250 OVERRIDE (IP): Performed by: STUDENT IN AN ORGANIZED HEALTH CARE EDUCATION/TRAINING PROGRAM

## 2021-06-04 PROCEDURE — 700111 HCHG RX REV CODE 636 W/ 250 OVERRIDE (IP): Performed by: EMERGENCY MEDICINE

## 2021-06-04 PROCEDURE — 99220 PR INITIAL OBSERVATION CARE,LEVL III: CPT | Performed by: STUDENT IN AN ORGANIZED HEALTH CARE EDUCATION/TRAINING PROGRAM

## 2021-06-04 PROCEDURE — 83036 HEMOGLOBIN GLYCOSYLATED A1C: CPT

## 2021-06-04 PROCEDURE — A9270 NON-COVERED ITEM OR SERVICE: HCPCS | Performed by: STUDENT IN AN ORGANIZED HEALTH CARE EDUCATION/TRAINING PROGRAM

## 2021-06-04 PROCEDURE — C9803 HOPD COVID-19 SPEC COLLECT: HCPCS | Performed by: EMERGENCY MEDICINE

## 2021-06-04 PROCEDURE — 96375 TX/PRO/DX INJ NEW DRUG ADDON: CPT

## 2021-06-04 PROCEDURE — 83735 ASSAY OF MAGNESIUM: CPT

## 2021-06-04 PROCEDURE — 80053 COMPREHEN METABOLIC PANEL: CPT

## 2021-06-04 PROCEDURE — 84703 CHORIONIC GONADOTROPIN ASSAY: CPT

## 2021-06-04 RX ORDER — AZITHROMYCIN 500 MG/1
500 TABLET, FILM COATED ORAL DAILY
Status: ON HOLD | COMMUNITY
End: 2021-06-09

## 2021-06-04 RX ORDER — POLYETHYLENE GLYCOL 3350 17 G/17G
1 POWDER, FOR SOLUTION ORAL
Status: DISCONTINUED | OUTPATIENT
Start: 2021-06-04 | End: 2021-06-10 | Stop reason: HOSPADM

## 2021-06-04 RX ORDER — CHOLECALCIFEROL (VITAMIN D3) 125 MCG
10 CAPSULE ORAL
Status: DISCONTINUED | OUTPATIENT
Start: 2021-06-04 | End: 2021-06-10 | Stop reason: HOSPADM

## 2021-06-04 RX ORDER — METHOCARBAMOL 750 MG/1
750 TABLET, FILM COATED ORAL
Status: DISCONTINUED | OUTPATIENT
Start: 2021-06-04 | End: 2021-06-10 | Stop reason: HOSPADM

## 2021-06-04 RX ORDER — BISACODYL 10 MG
10 SUPPOSITORY, RECTAL RECTAL
Status: DISCONTINUED | OUTPATIENT
Start: 2021-06-04 | End: 2021-06-10 | Stop reason: HOSPADM

## 2021-06-04 RX ORDER — ONDANSETRON 2 MG/ML
4 INJECTION INTRAMUSCULAR; INTRAVENOUS EVERY 4 HOURS PRN
Status: DISCONTINUED | OUTPATIENT
Start: 2021-06-04 | End: 2021-06-10 | Stop reason: HOSPADM

## 2021-06-04 RX ORDER — AMOXICILLIN 250 MG
2 CAPSULE ORAL 2 TIMES DAILY
Status: DISCONTINUED | OUTPATIENT
Start: 2021-06-04 | End: 2021-06-10 | Stop reason: HOSPADM

## 2021-06-04 RX ORDER — OXCARBAZEPINE 300 MG/1
300 TABLET, FILM COATED ORAL 2 TIMES DAILY
Status: DISCONTINUED | OUTPATIENT
Start: 2021-06-04 | End: 2021-06-10 | Stop reason: HOSPADM

## 2021-06-04 RX ORDER — MORPHINE SULFATE 4 MG/ML
2 INJECTION, SOLUTION INTRAMUSCULAR; INTRAVENOUS
Status: DISCONTINUED | OUTPATIENT
Start: 2021-06-04 | End: 2021-06-08

## 2021-06-04 RX ORDER — MORPHINE SULFATE 4 MG/ML
4 INJECTION, SOLUTION INTRAMUSCULAR; INTRAVENOUS ONCE
Status: COMPLETED | OUTPATIENT
Start: 2021-06-04 | End: 2021-06-04

## 2021-06-04 RX ORDER — IPRATROPIUM BROMIDE AND ALBUTEROL SULFATE 2.5; .5 MG/3ML; MG/3ML
3 SOLUTION RESPIRATORY (INHALATION)
Status: DISCONTINUED | OUTPATIENT
Start: 2021-06-04 | End: 2021-06-10 | Stop reason: HOSPADM

## 2021-06-04 RX ORDER — FERROUS SULFATE 325(65) MG
325 TABLET ORAL
Status: DISCONTINUED | OUTPATIENT
Start: 2021-06-05 | End: 2021-06-10 | Stop reason: HOSPADM

## 2021-06-04 RX ORDER — PROMETHAZINE HYDROCHLORIDE 25 MG/1
12.5-25 SUPPOSITORY RECTAL EVERY 4 HOURS PRN
Status: DISCONTINUED | OUTPATIENT
Start: 2021-06-04 | End: 2021-06-10 | Stop reason: HOSPADM

## 2021-06-04 RX ORDER — ALBUTEROL SULFATE 90 UG/1
2 AEROSOL, METERED RESPIRATORY (INHALATION) EVERY 4 HOURS PRN
Status: DISCONTINUED | OUTPATIENT
Start: 2021-06-04 | End: 2021-06-10 | Stop reason: HOSPADM

## 2021-06-04 RX ORDER — TRAZODONE HYDROCHLORIDE 100 MG/1
100 TABLET ORAL
Status: DISCONTINUED | OUTPATIENT
Start: 2021-06-04 | End: 2021-06-10 | Stop reason: HOSPADM

## 2021-06-04 RX ORDER — TOPIRAMATE 50 MG/1
50 TABLET, FILM COATED ORAL 2 TIMES DAILY
COMMUNITY
End: 2021-09-24

## 2021-06-04 RX ORDER — BUSPIRONE HYDROCHLORIDE 10 MG/1
30 TABLET ORAL 2 TIMES DAILY
Status: DISCONTINUED | OUTPATIENT
Start: 2021-06-04 | End: 2021-06-10 | Stop reason: HOSPADM

## 2021-06-04 RX ORDER — FLUOXETINE HYDROCHLORIDE 20 MG/1
40 CAPSULE ORAL DAILY
Status: DISCONTINUED | OUTPATIENT
Start: 2021-06-05 | End: 2021-06-10 | Stop reason: HOSPADM

## 2021-06-04 RX ORDER — LORAZEPAM 2 MG/ML
2 INJECTION INTRAMUSCULAR
Status: DISCONTINUED | OUTPATIENT
Start: 2021-06-04 | End: 2021-06-10 | Stop reason: HOSPADM

## 2021-06-04 RX ORDER — LABETALOL HYDROCHLORIDE 5 MG/ML
10 INJECTION, SOLUTION INTRAVENOUS EVERY 4 HOURS PRN
Status: DISCONTINUED | OUTPATIENT
Start: 2021-06-04 | End: 2021-06-10 | Stop reason: HOSPADM

## 2021-06-04 RX ORDER — TRAZODONE HYDROCHLORIDE 100 MG/1
100 TABLET ORAL
Status: ON HOLD | COMMUNITY
End: 2021-06-28

## 2021-06-04 RX ORDER — CLONIDINE HYDROCHLORIDE 0.1 MG/1
0.1 TABLET ORAL EVERY 6 HOURS PRN
Status: DISCONTINUED | OUTPATIENT
Start: 2021-06-04 | End: 2021-06-10 | Stop reason: HOSPADM

## 2021-06-04 RX ORDER — ACETAMINOPHEN 325 MG/1
650 TABLET ORAL EVERY 6 HOURS PRN
Status: DISCONTINUED | OUTPATIENT
Start: 2021-06-04 | End: 2021-06-10 | Stop reason: HOSPADM

## 2021-06-04 RX ORDER — MIRABEGRON 50 MG/1
50 TABLET, FILM COATED, EXTENDED RELEASE ORAL EVERY MORNING
COMMUNITY
End: 2021-09-24

## 2021-06-04 RX ORDER — ONDANSETRON 2 MG/ML
4 INJECTION INTRAMUSCULAR; INTRAVENOUS ONCE
Status: COMPLETED | OUTPATIENT
Start: 2021-06-04 | End: 2021-06-04

## 2021-06-04 RX ORDER — ENALAPRILAT 1.25 MG/ML
1.25 INJECTION INTRAVENOUS EVERY 6 HOURS PRN
Status: DISCONTINUED | OUTPATIENT
Start: 2021-06-04 | End: 2021-06-10 | Stop reason: HOSPADM

## 2021-06-04 RX ORDER — PROMETHAZINE HYDROCHLORIDE 25 MG/1
12.5-25 TABLET ORAL EVERY 4 HOURS PRN
Status: DISCONTINUED | OUTPATIENT
Start: 2021-06-04 | End: 2021-06-10 | Stop reason: HOSPADM

## 2021-06-04 RX ORDER — BACLOFEN 10 MG/1
10 TABLET ORAL 3 TIMES DAILY
Status: DISCONTINUED | OUTPATIENT
Start: 2021-06-04 | End: 2021-06-10 | Stop reason: HOSPADM

## 2021-06-04 RX ORDER — TOPIRAMATE 25 MG/1
50 TABLET ORAL 2 TIMES DAILY
Status: DISCONTINUED | OUTPATIENT
Start: 2021-06-04 | End: 2021-06-10 | Stop reason: HOSPADM

## 2021-06-04 RX ORDER — PROCHLORPERAZINE EDISYLATE 5 MG/ML
5-10 INJECTION INTRAMUSCULAR; INTRAVENOUS EVERY 4 HOURS PRN
Status: DISCONTINUED | OUTPATIENT
Start: 2021-06-04 | End: 2021-06-10 | Stop reason: HOSPADM

## 2021-06-04 RX ORDER — LEVOTHYROXINE SODIUM 0.1 MG/1
100 TABLET ORAL
Status: DISCONTINUED | OUTPATIENT
Start: 2021-06-05 | End: 2021-06-10 | Stop reason: HOSPADM

## 2021-06-04 RX ORDER — ZIPRASIDONE HYDROCHLORIDE 40 MG/1
40 CAPSULE ORAL 2 TIMES DAILY
Status: DISCONTINUED | OUTPATIENT
Start: 2021-06-04 | End: 2021-06-10 | Stop reason: HOSPADM

## 2021-06-04 RX ORDER — MYCOPHENOLATE MOFETIL 250 MG/1
500 CAPSULE ORAL 2 TIMES DAILY
Status: DISCONTINUED | OUTPATIENT
Start: 2021-06-04 | End: 2021-06-10 | Stop reason: HOSPADM

## 2021-06-04 RX ORDER — PREDNISONE 20 MG/1
20-60 TABLET ORAL SEE ADMIN INSTRUCTIONS
COMMUNITY
End: 2021-06-16

## 2021-06-04 RX ORDER — SODIUM CHLORIDE, SODIUM LACTATE, POTASSIUM CHLORIDE, CALCIUM CHLORIDE 600; 310; 30; 20 MG/100ML; MG/100ML; MG/100ML; MG/100ML
INJECTION, SOLUTION INTRAVENOUS CONTINUOUS
Status: DISCONTINUED | OUTPATIENT
Start: 2021-06-04 | End: 2021-06-10 | Stop reason: HOSPADM

## 2021-06-04 RX ORDER — PREGABALIN 150 MG/1
300 CAPSULE ORAL 2 TIMES DAILY
Status: DISCONTINUED | OUTPATIENT
Start: 2021-06-04 | End: 2021-06-10 | Stop reason: HOSPADM

## 2021-06-04 RX ORDER — ONDANSETRON 4 MG/1
4 TABLET, ORALLY DISINTEGRATING ORAL EVERY 4 HOURS PRN
Status: DISCONTINUED | OUTPATIENT
Start: 2021-06-04 | End: 2021-06-10 | Stop reason: HOSPADM

## 2021-06-04 RX ADMIN — MYCOPHENOLATE MOFETIL 500 MG: 250 CAPSULE ORAL at 23:11

## 2021-06-04 RX ADMIN — IVABRADINE 7.5 MG: 7.5 TABLET, FILM COATED ORAL at 23:13

## 2021-06-04 RX ADMIN — PREGABALIN 300 MG: 150 CAPSULE ORAL at 23:11

## 2021-06-04 RX ADMIN — BUSPIRONE HYDROCHLORIDE 30 MG: 10 TABLET ORAL at 23:12

## 2021-06-04 RX ADMIN — DOCUSATE SODIUM 50 MG AND SENNOSIDES 8.6 MG 2 TABLET: 8.6; 5 TABLET, FILM COATED ORAL at 23:11

## 2021-06-04 RX ADMIN — BACLOFEN 10 MG: 10 TABLET ORAL at 23:12

## 2021-06-04 RX ADMIN — TRAZODONE HYDROCHLORIDE 100 MG: 100 TABLET ORAL at 23:12

## 2021-06-04 RX ADMIN — MORPHINE SULFATE 4 MG: 4 INJECTION INTRAVENOUS at 18:23

## 2021-06-04 RX ADMIN — OXCARBAZEPINE 300 MG: 300 TABLET, FILM COATED ORAL at 23:14

## 2021-06-04 RX ADMIN — METHOCARBAMOL 750 MG: 750 TABLET ORAL at 23:11

## 2021-06-04 RX ADMIN — Medication 10 MG: at 23:12

## 2021-06-04 RX ADMIN — ONDANSETRON 4 MG: 2 INJECTION INTRAMUSCULAR; INTRAVENOUS at 18:23

## 2021-06-04 RX ADMIN — TOPIRAMATE 50 MG: 25 TABLET, FILM COATED ORAL at 23:12

## 2021-06-04 ASSESSMENT — LIFESTYLE VARIABLES
EVER FELT BAD OR GUILTY ABOUT YOUR DRINKING: NO
HAVE PEOPLE ANNOYED YOU BY CRITICIZING YOUR DRINKING: NO
HOW MANY TIMES IN THE PAST YEAR HAVE YOU HAD 5 OR MORE DRINKS IN A DAY: 0
DOES PATIENT WANT TO STOP DRINKING: NO
TOTAL SCORE: 0
AVERAGE NUMBER OF DAYS PER WEEK YOU HAVE A DRINK CONTAINING ALCOHOL: 0
TOTAL SCORE: 0
HAVE YOU EVER FELT YOU SHOULD CUT DOWN ON YOUR DRINKING: NO
TOTAL SCORE: 0
ALCOHOL_USE: NO
EVER HAD A DRINK FIRST THING IN THE MORNING TO STEADY YOUR NERVES TO GET RID OF A HANGOVER: NO
ON A TYPICAL DAY WHEN YOU DRINK ALCOHOL HOW MANY DRINKS DO YOU HAVE: 0
CONSUMPTION TOTAL: NEGATIVE

## 2021-06-04 ASSESSMENT — COPD QUESTIONNAIRES
HAVE YOU SMOKED AT LEAST 100 CIGARETTES IN YOUR ENTIRE LIFE: NO/DON'T KNOW
DURING THE PAST 4 WEEKS HOW MUCH DID YOU FEEL SHORT OF BREATH: MOST  OR ALL OF THE TIME
DO YOU EVER COUGH UP ANY MUCUS OR PHLEGM?: YES, A FEW DAYS A WEEK OR MONTH
COPD SCREENING SCORE: 3

## 2021-06-04 ASSESSMENT — ENCOUNTER SYMPTOMS
NERVOUS/ANXIOUS: 1
FALLS: 1
NAUSEA: 1
INSOMNIA: 1
BLOOD IN STOOL: 1
EYE PAIN: 1
DEPRESSION: 1

## 2021-06-04 ASSESSMENT — PAIN DESCRIPTION - PAIN TYPE: TYPE: ACUTE PAIN

## 2021-06-04 ASSESSMENT — FIBROSIS 4 INDEX: FIB4 SCORE: 0.51

## 2021-06-04 NOTE — ED NOTES
Pt ambulated unassisted to rm 19 with steady gait and bilateral ortho walking boots. Agree with triage.

## 2021-06-04 NOTE — ED TRIAGE NOTES
Chief Complaint   Patient presents with   • Eye Pain     x years dx w/optic neuritis   • Blurred Vision     x years dx w/optic neuritis   • Fall     has been having multiple falls since january. denies head trauma. aaox4. gcs 15     Was seen at [facility] this am for same, had CT done w/negative result. Was told by Scott (ophta-neurologist) to come in to get evaluated. Pt since her symptoms has been getting worse the last 2 days.

## 2021-06-05 LAB
ALBUMIN SERPL BCP-MCNC: 3.5 G/DL (ref 3.2–4.9)
ALBUMIN/GLOB SERPL: 2.1 G/DL
ALP SERPL-CCNC: 101 U/L (ref 30–99)
ALT SERPL-CCNC: 112 U/L (ref 2–50)
ANION GAP SERPL CALC-SCNC: 11 MMOL/L (ref 7–16)
AST SERPL-CCNC: 120 U/L (ref 12–45)
BASOPHILS # BLD AUTO: 0.5 % (ref 0–1.8)
BASOPHILS # BLD: 0.02 K/UL (ref 0–0.12)
BILIRUB SERPL-MCNC: 0.2 MG/DL (ref 0.1–1.5)
BUN SERPL-MCNC: 10 MG/DL (ref 8–22)
CALCIUM SERPL-MCNC: 9.1 MG/DL (ref 8.5–10.5)
CHLORIDE SERPL-SCNC: 107 MMOL/L (ref 96–112)
CO2 SERPL-SCNC: 18 MMOL/L (ref 20–33)
CREAT SERPL-MCNC: 0.8 MG/DL (ref 0.5–1.4)
EOSINOPHIL # BLD AUTO: 0.08 K/UL (ref 0–0.51)
EOSINOPHIL NFR BLD: 2.1 % (ref 0–6.9)
ERYTHROCYTE [DISTWIDTH] IN BLOOD BY AUTOMATED COUNT: 48.3 FL (ref 35.9–50)
GLOBULIN SER CALC-MCNC: 1.7 G/DL (ref 1.9–3.5)
GLUCOSE SERPL-MCNC: 97 MG/DL (ref 65–99)
HCT VFR BLD AUTO: 38.2 % (ref 37–47)
HEMOCCULT STL QL: NEGATIVE
HGB BLD-MCNC: 12 G/DL (ref 12–16)
IMM GRANULOCYTES # BLD AUTO: 0.02 K/UL (ref 0–0.11)
IMM GRANULOCYTES NFR BLD AUTO: 0.5 % (ref 0–0.9)
LYMPHOCYTES # BLD AUTO: 1.19 K/UL (ref 1–4.8)
LYMPHOCYTES NFR BLD: 31 % (ref 22–41)
MCH RBC QN AUTO: 28.9 PG (ref 27–33)
MCHC RBC AUTO-ENTMCNC: 31.4 G/DL (ref 33.6–35)
MCV RBC AUTO: 92 FL (ref 81.4–97.8)
MONOCYTES # BLD AUTO: 0.35 K/UL (ref 0–0.85)
MONOCYTES NFR BLD AUTO: 9.1 % (ref 0–13.4)
NEUTROPHILS # BLD AUTO: 2.18 K/UL (ref 2–7.15)
NEUTROPHILS NFR BLD: 56.8 % (ref 44–72)
NRBC # BLD AUTO: 0 K/UL
NRBC BLD-RTO: 0 /100 WBC
PLATELET # BLD AUTO: 168 K/UL (ref 164–446)
PMV BLD AUTO: 12 FL (ref 9–12.9)
POTASSIUM SERPL-SCNC: 4 MMOL/L (ref 3.6–5.5)
PROT SERPL-MCNC: 5.2 G/DL (ref 6–8.2)
RBC # BLD AUTO: 4.15 M/UL (ref 4.2–5.4)
SODIUM SERPL-SCNC: 136 MMOL/L (ref 135–145)
WBC # BLD AUTO: 3.8 K/UL (ref 4.8–10.8)

## 2021-06-05 PROCEDURE — 700105 HCHG RX REV CODE 258: Performed by: STUDENT IN AN ORGANIZED HEALTH CARE EDUCATION/TRAINING PROGRAM

## 2021-06-05 PROCEDURE — 96376 TX/PRO/DX INJ SAME DRUG ADON: CPT

## 2021-06-05 PROCEDURE — 82272 OCCULT BLD FECES 1-3 TESTS: CPT

## 2021-06-05 PROCEDURE — 99232 SBSQ HOSP IP/OBS MODERATE 35: CPT | Performed by: STUDENT IN AN ORGANIZED HEALTH CARE EDUCATION/TRAINING PROGRAM

## 2021-06-05 PROCEDURE — 700102 HCHG RX REV CODE 250 W/ 637 OVERRIDE(OP): Performed by: STUDENT IN AN ORGANIZED HEALTH CARE EDUCATION/TRAINING PROGRAM

## 2021-06-05 PROCEDURE — 85025 COMPLETE CBC W/AUTO DIFF WBC: CPT

## 2021-06-05 PROCEDURE — 700111 HCHG RX REV CODE 636 W/ 250 OVERRIDE (IP): Performed by: STUDENT IN AN ORGANIZED HEALTH CARE EDUCATION/TRAINING PROGRAM

## 2021-06-05 PROCEDURE — A9270 NON-COVERED ITEM OR SERVICE: HCPCS | Performed by: STUDENT IN AN ORGANIZED HEALTH CARE EDUCATION/TRAINING PROGRAM

## 2021-06-05 PROCEDURE — 80053 COMPREHEN METABOLIC PANEL: CPT

## 2021-06-05 PROCEDURE — 96372 THER/PROPH/DIAG INJ SC/IM: CPT

## 2021-06-05 PROCEDURE — 97166 OT EVAL MOD COMPLEX 45 MIN: CPT

## 2021-06-05 PROCEDURE — 96375 TX/PRO/DX INJ NEW DRUG ADDON: CPT

## 2021-06-05 PROCEDURE — 770006 HCHG ROOM/CARE - MED/SURG/GYN SEMI*

## 2021-06-05 PROCEDURE — 36415 COLL VENOUS BLD VENIPUNCTURE: CPT

## 2021-06-05 RX ADMIN — MYCOPHENOLATE MOFETIL 500 MG: 250 CAPSULE ORAL at 08:00

## 2021-06-05 RX ADMIN — OXCARBAZEPINE 300 MG: 300 TABLET, FILM COATED ORAL at 07:59

## 2021-06-05 RX ADMIN — LEVOTHYROXINE SODIUM 100 MCG: 0.1 TABLET ORAL at 05:50

## 2021-06-05 RX ADMIN — BUSPIRONE HYDROCHLORIDE 30 MG: 10 TABLET ORAL at 08:00

## 2021-06-05 RX ADMIN — TRAZODONE HYDROCHLORIDE 100 MG: 100 TABLET ORAL at 21:21

## 2021-06-05 RX ADMIN — ASPIRIN 81 MG: 81 TABLET, COATED ORAL at 05:50

## 2021-06-05 RX ADMIN — MYCOPHENOLATE MOFETIL 500 MG: 250 CAPSULE ORAL at 21:21

## 2021-06-05 RX ADMIN — DOCUSATE SODIUM 50 MG AND SENNOSIDES 8.6 MG 2 TABLET: 8.6; 5 TABLET, FILM COATED ORAL at 05:50

## 2021-06-05 RX ADMIN — ENOXAPARIN SODIUM 40 MG: 40 INJECTION SUBCUTANEOUS at 05:50

## 2021-06-05 RX ADMIN — BACLOFEN 10 MG: 10 TABLET ORAL at 05:50

## 2021-06-05 RX ADMIN — BACLOFEN 10 MG: 10 TABLET ORAL at 17:00

## 2021-06-05 RX ADMIN — IVABRADINE 7.5 MG: 7.5 TABLET, FILM COATED ORAL at 17:00

## 2021-06-05 RX ADMIN — FLUOXETINE 40 MG: 20 CAPSULE ORAL at 05:49

## 2021-06-05 RX ADMIN — BACLOFEN 10 MG: 10 TABLET ORAL at 12:28

## 2021-06-05 RX ADMIN — PREGABALIN 300 MG: 150 CAPSULE ORAL at 05:49

## 2021-06-05 RX ADMIN — METHOCARBAMOL 750 MG: 750 TABLET ORAL at 21:21

## 2021-06-05 RX ADMIN — MORPHINE SULFATE 2 MG: 4 INJECTION INTRAVENOUS at 15:08

## 2021-06-05 RX ADMIN — ZIPRASIDONE HYDROCHLORIDE 40 MG: 40 CAPSULE ORAL at 05:50

## 2021-06-05 RX ADMIN — TOPIRAMATE 50 MG: 25 TABLET, FILM COATED ORAL at 07:59

## 2021-06-05 RX ADMIN — ZIPRASIDONE HYDROCHLORIDE 40 MG: 40 CAPSULE ORAL at 17:00

## 2021-06-05 RX ADMIN — SODIUM CHLORIDE, POTASSIUM CHLORIDE, SODIUM LACTATE AND CALCIUM CHLORIDE: 600; 310; 30; 20 INJECTION, SOLUTION INTRAVENOUS at 00:08

## 2021-06-05 RX ADMIN — FERROUS SULFATE TAB 325 MG (65 MG ELEMENTAL FE) 325 MG: 325 (65 FE) TAB at 08:00

## 2021-06-05 RX ADMIN — MORPHINE SULFATE 2 MG: 4 INJECTION INTRAVENOUS at 05:58

## 2021-06-05 RX ADMIN — MORPHINE SULFATE 2 MG: 4 INJECTION INTRAVENOUS at 09:23

## 2021-06-05 RX ADMIN — TOPIRAMATE 50 MG: 25 TABLET, FILM COATED ORAL at 21:20

## 2021-06-05 RX ADMIN — IVABRADINE 7.5 MG: 7.5 TABLET, FILM COATED ORAL at 07:59

## 2021-06-05 RX ADMIN — PREGABALIN 300 MG: 150 CAPSULE ORAL at 17:00

## 2021-06-05 RX ADMIN — BUSPIRONE HYDROCHLORIDE 30 MG: 10 TABLET ORAL at 21:21

## 2021-06-05 RX ADMIN — ALBUTEROL SULFATE 2 PUFF: 90 AEROSOL, METERED RESPIRATORY (INHALATION) at 16:59

## 2021-06-05 RX ADMIN — MORPHINE SULFATE 2 MG: 4 INJECTION INTRAVENOUS at 00:08

## 2021-06-05 RX ADMIN — OXCARBAZEPINE 300 MG: 300 TABLET, FILM COATED ORAL at 21:21

## 2021-06-05 RX ADMIN — Medication 10 MG: at 21:21

## 2021-06-05 RX ADMIN — SODIUM CHLORIDE, POTASSIUM CHLORIDE, SODIUM LACTATE AND CALCIUM CHLORIDE: 600; 310; 30; 20 INJECTION, SOLUTION INTRAVENOUS at 09:29

## 2021-06-05 RX ADMIN — MORPHINE SULFATE 2 MG: 4 INJECTION INTRAVENOUS at 19:40

## 2021-06-05 ASSESSMENT — PAIN DESCRIPTION - PAIN TYPE
TYPE: ACUTE PAIN

## 2021-06-05 ASSESSMENT — ENCOUNTER SYMPTOMS
DIARRHEA: 0
LOSS OF CONSCIOUSNESS: 0
HEADACHES: 1
CHILLS: 0
NAUSEA: 1
PALPITATIONS: 0
FLANK PAIN: 0
SHORTNESS OF BREATH: 0
NECK PAIN: 0
COUGH: 0
VOMITING: 0
EYE PAIN: 1
DOUBLE VISION: 0
ABDOMINAL PAIN: 0
EYE DISCHARGE: 0
WHEEZING: 0
FEVER: 0
BACK PAIN: 0
BLOOD IN STOOL: 0
HEMOPTYSIS: 0
TREMORS: 0
BLURRED VISION: 0
SENSORY CHANGE: 0

## 2021-06-05 ASSESSMENT — COGNITIVE AND FUNCTIONAL STATUS - GENERAL
SUGGESTED CMS G CODE MODIFIER DAILY ACTIVITY: CK
HELP NEEDED FOR BATHING: A LOT
DRESSING REGULAR UPPER BODY CLOTHING: A LITTLE
EATING MEALS: A LITTLE
TOILETING: A LITTLE
DRESSING REGULAR LOWER BODY CLOTHING: A LOT
PERSONAL GROOMING: A LITTLE
DAILY ACTIVITIY SCORE: 16

## 2021-06-05 ASSESSMENT — ACTIVITIES OF DAILY LIVING (ADL): TOILETING: INDEPENDENT

## 2021-06-05 NOTE — PROGRESS NOTES
4 Eyes Skin Assessment Completed by SONYA Vallecillo and SONYA Riojas.    Head WDL  Ears WDL  Nose WDL  Mouth Bleeding - cracked lips with a small amount of blood  Neck WDL  Breast/Chest WDL  Shoulder Blades WDL  Spine WDL  (R) Arm/Elbow/Hand Bruising, swelling  (L) Arm/Elbow/Hand Bruising, swelling  Abdomen Scar  Groin WDL  Scrotum/Coccyx/Buttocks Redness and Blanching  (R) Leg Swelling  (L) Leg Swelling  (R) Heel/Foot/Toe Swelling  (L) Heel/Foot/Toe Swelling, open callous           Devices In Places Pulse Ox      Interventions In Place Pillows    Possible Skin Injury No    Pictures Uploaded Into Epic N/A  Wound Consult Placed N/A  RN Wound Prevention Protocol Ordered No

## 2021-06-05 NOTE — ED NOTES
Med rec completed per Pt at bedside, and Pt's pharmacy [facility] on [address] ([phone]) and [facility] on [address] ([phone]).  Allergies reviewed with Pt.  Pt was recently on a 5 day course of azithromycin 500 mg 1 tablet every day, dispensed 5/28/2021, which Pt reports that she FINISHED.  Pt was also prescribed a prednisone taper on 5/27/2021, however Pt states that she forgot to take this medication after the first 3 days.  Per [facility], Pt is prescribed methocarbamol 750 mg 1 tablet 4 times a day as needed; Pt states that she only takes 1 tablet once per day, at bedtime.  Per [facility], Pt is prescribed mycophenolate 500 mg with directions to take 2 tablets (1,000 mg) twice per day; Pt states that she takes 1 tablet (500 mg) twice per day.  Pt takes ASPIRIN 81 mg every morning.

## 2021-06-05 NOTE — CARE PLAN
Problem: Knowledge Deficit - Standard  Goal: Patient and family/care givers will demonstrate understanding of plan of care, disease process/condition, diagnostic tests and medications  Outcome: Progressing     Problem: Pain - Standard  Goal: Alleviation of pain or a reduction in pain to the patient’s comfort goal  Outcome: Progressing     Problem: Pain - Standard  Goal: Alleviation of pain or a reduction in pain to the patient’s comfort goal  Outcome: Progressing     Problem: Fall Risk  Goal: Patient will remain free from falls  Outcome: Progressing   The patient is Stable - Low risk of patient condition declining or worsening    Shift Goals  Clinical Goals: MRI screening for  Patient Goals: pain control    Progress made toward(s) clinical / shift goals:  Fall risk precautions, incontinence care

## 2021-06-05 NOTE — RESPIRATORY CARE
COPD EDUCATION by COPD CLINICAL EDUCATOR  6/5/2021 at 7:59 AM by Sulma Tate, RRT     Patient reviewed by COPD education team. Patient does not have a history or diagnosis of COPD and is a non-smoker.  Therefore does not qualify for the COPD program. She has Asthma and has PFT's in EMR to confirm. She has a Pulmonary Sleep appointment 8/9/2021 with Dr Ignacia Herrera

## 2021-06-05 NOTE — ASSESSMENT & PLAN NOTE
Worsening eye pain and multiple falls  Fall precautions in place  MRI brain with and without contrast ordered  Consulted with Dr. Newton in a.m.

## 2021-06-05 NOTE — CARE PLAN
The patient is Stable - Low risk of patient condition declining or worsening    Shift Goals  Clinical Goals: MRI screening form  Patient Goals: pain control    Progress made toward(s) clinical / shift goals:  MRI screening form complete, pain controlled with PRN morphine.    Patient is not progressing towards the following goals: n/a

## 2021-06-05 NOTE — ASSESSMENT & PLAN NOTE
MRI orbits with and without contrast, MRI brain with and without contrast ordered  Patient reported the blurring vision, which is new for her    6/6 I called Neuro-ophthalmologist Dr. Ramirez, who recommended a full work-up with MRI  and Solu-Medrol 250 mg daily for 3 days.     MRI reviewed, MRI brain, MRI orbital, MRI cervical/thoracic/lumbar, with and without contrast where reviewed, negative for demyelination, tumor, infection, disc protrusion, stenosis. Negative for optic neuritis.   Positive for Schmorl's node endplate irregularities, but unlikely the cause of patient's current eye pain and vision changes.    Dr. Hart to follow

## 2021-06-05 NOTE — THERAPY
Occupational Therapy   Initial Evaluation     Patient Name: Kristin Balderrama  Age:  30 y.o., Sex:  female  Medical Record #: [id number]  Today's Date: 6/5/2021     Precautions  Precautions: (P) Fall Risk    Assessment  Patient is 30 y.o. female with a diagnosis of falls, blurred vision, eye pain.  Additional factors influencing patient status / progress: good family support at home. Demonstrates limited activity tolerance , limited ADL and transfer independence. Will benefit from OT to focus on deficit areas..      Plan    Recommend Occupational Therapy 3 times per week until therapy goals are met for the following treatments:  Adaptive Equipment, Self Care/Activities of Daily Living, Therapeutic Activities and Therapeutic Exercises.    DC Equipment Recommendations: (P) None  Discharge Recommendations: (P) Recommend home health for continued occupational therapy services     Subjective    Pleasant and cooperative     Objective       06/05/21 0840   Total Time Spent   Total Time Spent (Mins)    Charge Group   OT Evaluation OT Evaluation Mod   Initial Contact Note    Initial Contact Note Order Received and Verified, Occupational Therapy Evaluation in Progress with Full Report to Follow.   Prior Living Situation   Prior Services None   Housing / Facility [address]   Steps Into Home   (ramp)   Steps In Home 0   Bathroom Set up Walk In Shower;Bathtub / Shower Combination   Equipment Owned Front-Wheel Walker;4-Wheel Walker;Single Point Cane;Wheelchair;Hospital Bed   Lives with - Patient's Self Care Capacity Other (Comments)  (grandmother. aunt lives in inlaw apartment)   Prior Level of ADL Function   Self Feeding Independent   Grooming / Hygiene Independent   Bathing Independent   Dressing Independent   Toileting Independent   Comments needs cues and reminders, mostly to wear B LE walking boots   Prior Level of IADL Function   Medication Management Requires Assist   Laundry Requires Assist   Kitchen Mobility  Independent   Finances Requires Assist   Home Management Requires Assist   Shopping Requires Assist   Prior Level Of Mobility Independent With Device in Home   Driving / Transportation Relatives / Others Provide Transportation   History of Falls   History of Falls Yes   Date of Last Fall   (reason for admit)   Precautions   Precautions Fall Risk   Vitals   O2 Delivery Device None - Room Air   Pain 0 - 10 Group   Therapist Pain Assessment During Activity;Post Activity Pain Same as Prior to Activity;Nurse Notified;2  (pressure behind eyes)   Cognition    Level of Consciousness Alert   Comments appears to be at baseline level   Passive ROM Upper Body   Passive ROM Upper Body WDL   Active ROM Upper Body   Active ROM Upper Body  WDL   Dominant Hand Right   Strength Upper Body   Upper Body Strength  WDL   Sensation Upper Body   Upper Extremity Sensation  WDL   Upper Body Muscle Tone   Upper Body Muscle Tone  WDL   Coordination Upper Body   Coordination WDL   Balance Assessment   Sitting Balance (Static) Fair -   Sitting Balance (Dynamic) Fair -   Standing Balance (Static) Fair -   Standing Balance (Dynamic) Poor +   Weight Shift Sitting Fair   Weight Shift Standing Poor   Bed Mobility    Supine to Sit Supervised   Sit to Supine Supervised   Scooting Supervised   Rolling Supervised   ADL Assessment   Eating Supervision   Grooming Supervision;Seated   Bathing   (NT)   Upper Body Dressing Minimal Assist   Lower Body Dressing Maximal Assist  (mostly with  boots)   Toileting   (NT, declined need)   How much help from another person does the patient currently need...   Putting on and taking off regular lower body clothing? 2   Bathing (including washing, rinsing, and drying)? 2   Toileting, which includes using a toilet, bedpan, or urinal? 3   Putting on and taking off regular upper body clothing? 3   Taking care of personal grooming such as brushing teeth? 3   Eating meals? 3   6 Clicks Daily Activity Score 16   Functional  Mobility   Sit to Stand Minimal Assist   Bed, Chair, Wheelchair Transfer Unable to Participate  (declined due to eye pain)   Visual Perception   Comments eyes closed throughout session, reports they hurt to keep open   Activity Tolerance   Sitting Edge of Bed 10 mins   Standing 1 min   Patient / Family Goals   Patient / Family Goal #1 go home   Short Term Goals   Short Term Goal # 1 supervisd level for necesary functional transfers   Short Term Goal # 2 set up for seated dressing tasks   Short Term Goal # 3 distant supervision for toileting tasks   Education Group   Role of Occupational Therapist Patient Response Patient;Acceptance;Explanation;Demonstration;Verbal Demonstration;Action Demonstration   Problem List   Problem List Decreased Active Daily Living Skills;Decreased Activity Tolerance;Decreased Functional Mobility   Anticipated Discharge Equipment and Recommendations   DC Equipment Recommendations None   Discharge Recommendations Recommend home health for continued occupational therapy services   Interdisciplinary Plan of Care Collaboration   IDT Collaboration with  Nursing   Patient Position at End of Therapy In Bed;Bed Alarm On;Call Light within Reach;Tray Table within Reach;Phone within Reach   Collaboration Comments report given   Session Information   Date / Session Number  6/5, 1 ( 1/3, 6/11)   Priority 2

## 2021-06-05 NOTE — H&P
Hospital Medicine History & Physical Note    Date of Service  6/4/2021    Primary Care Physician  Torres Brody M.D.    Consultants  Dr. Cabral, Neuropthalmology    Code Status  Full Code    Chief Complaint  Chief Complaint   Patient presents with   • Eye Pain     x years dx w/optic neuritis   • Blurred Vision     x years dx w/optic neuritis   • Fall     has been having multiple falls since january. denies head trauma. aaox4. gcs 15       History of Presenting Illness  30 y.o. female past medical history of schizophrenia, TONYA, optic neuritis, normal pressure hydrocephalus, transverse myelitis, scoliosis, who presented 6/4/2021 for evaluation of worsening bilateral eye pain.   She sees Dr Yousif, neuro-ophthalmology who advised her to come in.  The pt has no changes to her vision. No cp, no sob, no vomiting.  She has no fall or trauma this week however, describes multiple falls in the past few months     CBC and CMP are within normal limits, vital signs stable    Optic neuritis, multiple falls since January    Dr. Zee, Neuropthalmologist is consulted in the emergency department.    Patient is admitted to hospitalist service for medical management.    Review of Systems  Review of Systems   Eyes: Positive for pain.   Gastrointestinal: Positive for blood in stool and nausea.   Musculoskeletal: Positive for falls.   Psychiatric/Behavioral: Positive for depression. The patient is nervous/anxious and has insomnia.    All other systems reviewed and are negative.      Past Medical History   has a past medical history of Abdominal pain, Anginal syndrome, Apnea, sleep, Arrhythmia, Arthritis, ASTHMA, Atrial fibrillation (HCC), Back pain, Borderline personality disorder (HCC), Breath shortness, Bronchitis, Cardiac arrhythmia, Chickenpox, Chronic UTI (9/18/2010), Cough, Daytime sleepiness, Dental disorder (03/08/2021), Depression, Diabetes (HCC), Diarrhea, Disorder of thyroid, Fall, Fatigue, Frequent headaches,  Gasping for breath, Gynecological disorder, Headache(784.0), Heart burn, Heart murmur, History of falling, Indigestion, Migraine, Mitochondrial disease (HCC), 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    Surgical History   has a past surgical history that includes neuro dest facet l/s w/ig sngl (4/21/2015); recovery (1/27/2016); delmar by laparoscopy (8/29/2010); lumbar fusion anterior (8/21/2012); other cardiac surgery (1/2016); tonsillectomy; bowel stimulator insertion (7/13/2016); gastroscopy with balloon dilatation (N/A, 1/4/2017); muscle biopsy (Right, 1/26/2017); other abdominal surgery; laminotomy; and bowel stimulator insertion (3/10/2021).     Family History  family history includes Genitourinary () Problems in her sister; Heart Disease in her maternal grandmother and mother; Hypertension in her maternal grandmother, maternal uncle, and mother; No Known Problems in her sister; Other in her mother and sister; Sleep Apnea in her mother.     Social History   reports that she has never smoked. She has never used smokeless tobacco. She reports previous drug use. Frequency: 7.00 times per week. Drug: Marijuana. She reports that she does not drink alcohol.    Allergies  Allergies   Allergen Reactions   • Depakote [Divalproex Sodium] Unspecified     Muscle spasms/muscle pain and weakness     • Montelukast [Singulair]      Cardiac effusion   • Amitriptyline Unspecified     Headaches     • Aripiprazole [Abilify] Unspecified     Headaches/muscle twitching     • Clindamycin Nausea     Even with food     • Flagyl [Metronidazole Hcl]  "Unspecified     \"eye problems\"     • Flomax [Tamsulosin Hydrochloride] Swelling   • Levaquin Unspecified     Severe muscle cramps in legs  RXN=unknown   • Metformin Unspecified     Increased lactic acid      • Tamsulosin Swelling     Swelling of legs   • Tape Rash     Tears skin off  coban with Tegaderm tape ok intermittently  RXN=ongoing   • Vancomycin Itching     Pt becomes flushed in face and chest.   RXN=7/10/16   • Wound Dressing Adhesive Hives     By pt report   • Ampicillin Rash     Pt reports that she received a rash    • Ciprofloxacin Rash         • Keflex Rash     Pt states she gets a rash with this medication  Tolerates ceftriaxone  Can take with Benadryl   • Levofloxacin Unspecified     Leg muscle cramps   • Metronidazole Rash     \"Vision problems\"   • Penicillins Hives     Can take with Benadryl   • Sulfa Drugs Itching and Myalgia     Muscle pain and weakness   • Valproic Acid Rash     .       Medications  Prior to Admission Medications   Prescriptions Last Dose Informant Patient Reported? Taking?   Dulaglutide (TRULICITY) 1.5 MG/0.5ML Solution Pen-injector 6/4/2021 at AM Patient's Home Pharmacy No No   Sig: Inject 0.5 mL under the skin every 7 days.   Patient taking differently: Inject 0.5 mL under the skin every Friday.   Fluticasone Furoate-Vilanterol (BREO ELLIPTA) 200-25 MCG/INH AEROSOL POWDER, BREATH ACTIVATED 6/4/2021 at AM Patient's Home Pharmacy Yes No   Sig: Inhale 1 Puff every morning.   Melatonin 10 MG Tab 6/3/2021 at PM Patient Yes No   Sig: Take 10 mg by mouth every bedtime.   Mirabegron ER (MYRBETRIQ) 50 MG TABLET SR 24 HR 6/4/2021 at AM Patient's Home Pharmacy Yes Yes   Sig: Take 50 mg by mouth every morning.   OXcarbazepine (TRILEPTAL) 300 MG Tab 6/4/2021 at AM Patient's Home Pharmacy No No   Sig: TAKE 1 TABLET BY MOUTH TWICE A DAY   Patient taking differently: Take 300 mg by mouth 2 times a day.   albuterol 108 (90 Base) MCG/ACT Aero Soln inhalation aerosol 6/3/2021 at PM Patient No No "   Sig: Inhale 2 Puffs every 6 hours as needed for Shortness of Breath.   aspirin 81 MG EC tablet 6/4/2021 at AM Patient Yes No   Sig: Take 81 mg by mouth every morning.   azithromycin (ZITHROMAX) 500 MG tablet 6/1/2021 at FINISHED Patient's Home Pharmacy Yes Yes   Sig: Take 500 mg by mouth every day. 5 day course dispensed 5/28/2021.   baclofen (LIORESAL) 10 MG Tab 6/4/2021 at AM Patient's Home Pharmacy No No   Sig: Take 1 tablet by mouth 3 times a day.   busPIRone (BUSPAR) 30 MG tablet 6/4/2021 at AM Patient Yes No   Sig: Take 30 mg by mouth 2 times a day.   ferrous sulfate 325 (65 Fe) MG tablet 6/4/2021 at AM Patient's Home Pharmacy No No   Sig: Take 1 tablet by mouth every morning with breakfast.   fluoxetine (PROZAC) 40 MG capsule 6/4/2021 at AM Patient's Home Pharmacy No No   Sig: TAKE 1 CAPSULE BY MOUTH EVERY DAY   fluticasone-salmeterol (ADVAIR) 250-50 MCG/DOSE AEROSOL POWDER, BREATH ACTIVATED NOT TAKING at NOT TAKING Patient No No   Sig: Inhale 1 Puff 2 times a day for 60 days.   Patient not taking: Reported on 6/4/2021   ibuprofen (MOTRIN) 800 MG Tab NOT TAKING at NOT TAKING Patient's Home Pharmacy No No   Sig: Take 1 tablet by mouth every 8 hours as needed for Moderate Pain. Take with food   Patient not taking: Reported on 6/4/2021   ipratropium-albuterol (DUONEB) 0.5-2.5 (3) MG/3ML nebulizer solution 6/3/2021 at PM Patient No No   Sig: Take 3 mL by nebulization every four hours as needed for Shortness of Breath. Nebulizer   ivabradine (CORLANOR) 7.5 MG Tab tablet 6/4/2021 at AM Patient No No   Sig: Take 1 tablet by mouth 2 times a day, with meals.   levothyroxine (SYNTHROID) 100 MCG Tab 6/4/2021 at AM Patient's Home Pharmacy Yes No   Sig: Take 100 mcg by mouth Every morning on an empty stomach.   methocarbamol (ROBAXIN) 750 MG Tab 6/3/2021 at PM Patient's Home Pharmacy No No   Sig: Take 1 tablet by mouth 4 times a day. As needed for muscle spasm   Patient taking differently: Take 750 mg by mouth at  bedtime. As needed for muscle spasm   metoprolol SR (TOPROL XL) 25 MG TABLET SR 24 HR 6/3/2021 at PM Patient's Home Pharmacy No No   Sig: Take 0.5 Tablets by mouth every evening.   Patient taking differently: Take 12.5 mg by mouth every evening. 0.5 tablet = 12.5 mg   mycophenolate (CELLCEPT) 500 MG tablet 6/4/2021 at AM Patient No No   Sig: Take 2 Tablets by mouth 2 times a day.   Patient taking differently: Take 500 mg by mouth 2 times a day.   omeprazole (PRILOSEC) 40 MG delayed-release capsule 6/4/2021 at AM Patient's Home Pharmacy No No   Sig: Take 1 capsule by mouth every day.   predniSONE (DELTASONE) 20 MG Tab 5/29/2021 at Unknown time (NOT FINISHED) Patient's Home Pharmacy Yes Yes   Sig: Take 20-60 mg by mouth see administration instructions. 9 day course dispensed 5/27/2021. Take 3 tablets (60 mg) by mouth daily for 3 days, then take 2 tablets (40 mg) daily for 3 days, then take 1 tablet (20 mg) daily for 3 days, then stop.   pregabalin (LYRICA) 300 MG capsule 6/4/2021 at AM Patient's Home Pharmacy Yes No   Sig: Take 300 mg by mouth 2 times a day.   topiramate (TOPAMAX) 50 MG tablet 6/4/2021 at AM Patient's Home Pharmacy Yes Yes   Sig: Take 50 mg by mouth 2 times a day.   traZODone (DESYREL) 100 MG Tab 6/3/2021 at PM Patient's Home Pharmacy Yes Yes   Sig: Take 100 mg by mouth at bedtime.   ziprasidone (GEODON) 40 MG Cap 6/4/2021 at AM Patient's Home Pharmacy No No   Sig: TAKE 1 CAPSULE BY MOUTH TWICE A DAY   Patient taking differently: Take 40 mg by mouth 2 times a day. TAKE 1 CAPSULE BY MOUTH TWICE A DAY      Facility-Administered Medications: None       Physical Exam  Temp:  [36.3 °C (97.4 °F)] 36.3 °C (97.4 °F)  Pulse:  [69-85] 78  Resp:  [16-20] 20  BP: (102-125)/(53-75) 105/53  SpO2:  [95 %-97 %] 96 %    Physical Exam       Constitutional: Resting comfortably in NAD   HENT: Normocephalic, no obvious evidence of acute trauma.  Eyes: No scleral icterus. Normal conjunctiva, patient describes pain with  bright lights   Neck: Comfortable movement without any obvious restriction in the range of motion.  Cardiovascular: Upon ascultation I appreciate a regular heart rhythm and a normal rate with no murmurs, rubs or gallops  Thorax & Lungs: No respiratory distress. No wheezing, rales or rhonchi heard on ausculation.  there is no obvious chest wall tenderness. I appreciate normal air movement throughout.   Abdomen: The abdomen is not visibly distended. Upon palpation, I find it to be without tenderness.  No mass appreciated.  Skin:  Couple areas of ecchymosis at left upper extremity, tender to palpation at bilateral lower extremities, both lower extremities placed in Unna boots, patient denies fractures but admits to strains and sprains over the past few months  Extremities/Musculoskeletal: no lower extremity edema with no asymmetry.  Neurologic: Alert & oriented. No focal deficits observed.   Psychiatric: Normal affect appropriate for the clinical situation.    Laboratory:  Recent Labs     06/04/21  1809   WBC 5.6   RBC 4.45   HEMOGLOBIN 12.9   HEMATOCRIT 40.8   MCV 91.7   MCH 29.0   MCHC 31.6*   RDW 48.9   PLATELETCT 209   MPV 11.7     Recent Labs     06/04/21  1809   SODIUM 141   POTASSIUM 4.1   CHLORIDE 111   CO2 21   GLUCOSE 95   BUN 12   CREATININE 0.75   CALCIUM 8.9     Recent Labs     06/04/21  1809   ALTSGPT 19   ASTSGOT 17   ALKPHOSPHAT 88   TBILIRUBIN 0.2   GLUCOSE 95         No results for input(s): NTPROBNP in the last 72 hours.      No results for input(s): TROPONINT in the last 72 hours.    Imaging:  MR-BRAIN-WITH & W/O    (Results Pending)   MR-ORBITS,FACE,NECK-WITH&W/O & SEQUENCES    (Results Pending)         Assessment/Plan:  I anticipate this patient is appropriate for observation status at this time.    Normal pressure hydrocephalus (HCC)- (present on admission)  Assessment & Plan  Worsening eye pain and multiple falls  Fall precautions in place  MRI brain with and without contrast ordered  Consulted  with Dr. Newton in a.m.    Optic neuritis- (present on admission)  Assessment & Plan  Dr. Newton consulted in ED, please consult in a.m.-- voicemail left for Dr. Cabral on admission, cell phone number in handoff    MRI orbits with and without contrast, MRI brain with and without contrast ordered    Hypothyroidism- (present on admission)  Assessment & Plan  Continue home meds  TSH ordered to assess    Schizophrenia (HCC)- (present on admission)  Assessment & Plan  Continue home medications      Hyperglycemia- (present on admission)  Assessment & Plan  A1c seem to be normal at 5.4, glucose 95 on admission  Patient describes history of hyperglycemia

## 2021-06-05 NOTE — ED PROVIDER NOTES
ED Provider Note    CHIEF COMPLAINT  Chief Complaint   Patient presents with   • Eye Pain     x years dx w/optic neuritis   • Blurred Vision     x years dx w/optic neuritis   • Fall     has been having multiple falls since january. denies head trauma. aaox4. gcs 15       HPI  Kristin Balderrama is a 30 y.o. female here for evaluation of bilateral eye pain.  Patient has history of optic neuritis, and normal pressure hydrocephalus.  She sees Dr Yousif, who advised her to come in.  The pt has no changes to her vision. No cp, no sob, no vomiting.  She has no fall or trauma.      ROS;  Please see HPI  O/W negative     PAST MEDICAL HISTORY   has a past medical history of Abdominal pain, Anginal syndrome, Apnea, sleep, Arrhythmia, Arthritis, ASTHMA, Atrial fibrillation (HCC), Back pain, Borderline personality disorder (HCC), Breath shortness, Bronchitis, Cardiac arrhythmia, Chickenpox, Chronic UTI (9/18/2010), Cough, Daytime sleepiness, Dental disorder (03/08/2021), Depression, Diabetes (HCC), Diarrhea, Disorder of thyroid, Fall, Fatigue, Frequent headaches, Gasping for breath, Gynecological disorder, Headache(784.0), Heart burn, Heart murmur, History of falling, Indigestion, Migraine, Mitochondrial disease (HCC), 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HCC), Nausea, Obesity, Other fatigue (6/29/2020), Pain (08-15-12), Painful joint, PCOS (polycystic ovarian syndrome), Pneumonia (2012 12/2020), Psychosis (HCC), Ringing in ears, Scoliosis, Shortness of breath, Sinus tachycardia (10/31/2013), Sleep apnea, Snoring, Tonsillitis, 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HCC), Tuberculosis, Urinary bladder disorder, Urinary incontinence, Weakness, and Wears glasses.    SOCIAL HISTORY  Social History     Tobacco Use   • Smoking status: Never Smoker   • Smokeless tobacco: Never Used   Vaping Use   • Vaping Use: Never used   Substance and Sexual Activity   • Alcohol use: No     Alcohol/week: 0.0 oz   • Drug use: Not Currently     Frequency: 7.0 times per  week     Types: Marijuana   • Sexual activity: Not Currently     Birth control/protection: Pill       SURGICAL HISTORY   has a past surgical history that includes neuro dest facet l/s w/ig sngl (4/21/2015); recovery (1/27/2016); [place] by laparoscopy (8/29/2010); lumbar fusion anterior (8/21/2012); other cardiac surgery (1/2016); tonsillectomy; bowel stimulator insertion (7/13/2016); gastroscopy with balloon dilatation (N/A, 1/4/2017); muscle biopsy (Right, 1/26/2017); other abdominal surgery; laminotomy; and bowel stimulator insertion (3/10/2021).    CURRENT MEDICATIONS  Home Medications     Reviewed by Ulisses Courtney (Pharmacy Tech) on 06/04/21 at 1720  Med List Status: Complete   Medication Last Dose Status   albuterol 108 (90 Base) MCG/ACT Aero Soln inhalation aerosol 6/3/2021 Active   aspirin 81 MG EC tablet 6/4/2021 Active   azithromycin (ZITHROMAX) 500 MG tablet 6/1/2021 Active   baclofen (LIORESAL) 10 MG Tab 6/4/2021 Active   busPIRone (BUSPAR) 30 MG tablet 6/4/2021 Active   Dulaglutide (TRULICITY) 1.5 MG/0.5ML Solution Pen-injector 6/4/2021 Active   ferrous sulfate 325 (65 Fe) MG tablet 6/4/2021 Active   fluoxetine (PROZAC) 40 MG capsule 6/4/2021 Active   Fluticasone Furoate-Vilanterol (BREO ELLIPTA) 200-25 MCG/INH AEROSOL POWDER, BREATH ACTIVATED 6/4/2021 Active   fluticasone-salmeterol (ADVAIR) 250-50 MCG/DOSE AEROSOL POWDER, BREATH ACTIVATED NOT TAKING Active   ibuprofen (MOTRIN) 800 MG Tab  Active   ipratropium-albuterol (DUONEB) 0.5-2.5 (3) MG/3ML nebulizer solution 6/3/2021 Active   ivabradine (CORLANOR) 7.5 MG Tab tablet 6/4/2021 Active   levothyroxine (SYNTHROID) 100 MCG Tab 6/4/2021 Active   Melatonin 10 MG Tab 6/3/2021 Active   methocarbamol (ROBAXIN) 750 MG Tab 6/3/2021 Active   metoprolol SR (TOPROL XL) 25 MG TABLET SR 24 HR 6/3/2021 Active   Mirabegron ER (MYRBETRIQ) 50 MG TABLET SR 24 HR 6/4/2021 Active   mycophenolate (CELLCEPT) 500 MG tablet 6/4/2021 Active   omeprazole (PRILOSEC) 40 MG  "delayed-release capsule 6/4/2021 Active   OXcarbazepine (TRILEPTAL) 300 MG Tab 6/4/2021 Active   predniSONE (DELTASONE) 20 MG Tab 5/29/2021 Active   pregabalin (LYRICA) 300 MG capsule 6/4/2021 Active   topiramate (TOPAMAX) 50 MG tablet 6/4/2021 Active   traZODone (DESYREL) 100 MG Tab 6/3/2021 Active   ziprasidone (GEODON) 40 MG Cap 6/4/2021 Active                ALLERGIES  Allergies   Allergen Reactions   • Depakote [Divalproex Sodium] Unspecified     Muscle spasms/muscle pain and weakness     • Montelukast [Singulair]      Cardiac effusion   • Amitriptyline Unspecified     Headaches     • Aripiprazole [Abilify] Unspecified     Headaches/muscle twitching     • Clindamycin Nausea     Even with food     • Flagyl [Metronidazole Hcl] Unspecified     \"eye problems\"     • Flomax [Tamsulosin Hydrochloride] Swelling   • Levaquin Unspecified     Severe muscle cramps in legs  RXN=unknown   • Metformin Unspecified     Increased lactic acid      • Tamsulosin Swelling     Swelling of legs   • Tape Rash     Tears skin off  coban with Tegaderm tape ok intermittently  RXN=ongoing   • Vancomycin Itching     Pt becomes flushed in face and chest.   RXN=7/10/16   • Wound Dressing Adhesive Hives     By pt report   • Ampicillin Rash     Pt reports that she received a rash    • Ciprofloxacin Rash         • Keflex Rash     Pt states she gets a rash with this medication  Tolerates ceftriaxone  Can take with Benadryl   • Levofloxacin Unspecified     Leg muscle cramps   • Metronidazole Rash     \"Vision problems\"   • Penicillins Hives     Can take with Benadryl   • Sulfa Drugs Itching and Myalgia     Muscle pain and weakness   • Valproic Acid Rash     .       REVIEW OF SYSTEMS  See HPI for further details. Review of systems as above, otherwise all other systems are negative.     PHYSICAL EXAM  VITAL SIGNS: /55   Pulse 70   Temp 36.3 °C (97.4 °F) (Temporal)   Resp 16   Ht 1.651 m (5' 5\")   Wt 115 kg (253 lb 8.5 oz)   SpO2 97%   BMI " 42.19 kg/m²     Constitutional: Well developed, well nourished. No acute distress.  HEENT: Normocephalic, atraumatic. MMM, EOMI, Perrl.  Neck: Supple, Full range of motion   Chest/Pulmonary:  No respiratory distress.  Equal expansion   Musculoskeletal: No deformity, no edema, neurovascular intact.   Neuro: Clear speech, appropriate, cooperative, cranial nerves II-XII grossly intact.  Psych: Normal mood and affect    No orders to display     Results for orders placed or performed during the hospital encounter of 06/04/21   CBC WITH DIFFERENTIAL   Result Value Ref Range    WBC 5.6 4.8 - 10.8 K/uL    RBC 4.45 4.20 - 5.40 M/uL    Hemoglobin 12.9 12.0 - 16.0 g/dL    Hematocrit 40.8 37.0 - 47.0 %    MCV 91.7 81.4 - 97.8 fL    MCH 29.0 27.0 - 33.0 pg    MCHC 31.6 (L) 33.6 - 35.0 g/dL    RDW 48.9 35.9 - 50.0 fL    Platelet Count 209 164 - 446 K/uL    MPV 11.7 9.0 - 12.9 fL    Neutrophils-Polys 58.60 44.00 - 72.00 %    Lymphocytes 29.00 22.00 - 41.00 %    Monocytes 9.10 0.00 - 13.40 %    Eosinophils 2.10 0.00 - 6.90 %    Basophils 0.70 0.00 - 1.80 %    Immature Granulocytes 0.50 0.00 - 0.90 %    Nucleated RBC 0.00 /100 WBC    Neutrophils (Absolute) 3.29 2.00 - 7.15 K/uL    Lymphs (Absolute) 1.63 1.00 - 4.80 K/uL    Monos (Absolute) 0.51 0.00 - 0.85 K/uL    Eos (Absolute) 0.12 0.00 - 0.51 K/uL    Baso (Absolute) 0.04 0.00 - 0.12 K/uL    Immature Granulocytes (abs) 0.03 0.00 - 0.11 K/uL    NRBC (Absolute) 0.00 K/uL   COMP METABOLIC PANEL   Result Value Ref Range    Sodium 141 135 - 145 mmol/L    Potassium 4.1 3.6 - 5.5 mmol/L    Chloride 111 96 - 112 mmol/L    Co2 21 20 - 33 mmol/L    Anion Gap 9.0 7.0 - 16.0    Glucose 95 65 - 99 mg/dL    Bun 12 8 - 22 mg/dL    Creatinine 0.75 0.50 - 1.40 mg/dL    Calcium 8.9 8.5 - 10.5 mg/dL    AST(SGOT) 17 12 - 45 U/L    ALT(SGPT) 19 2 - 50 U/L    Alkaline Phosphatase 88 30 - 99 U/L    Total Bilirubin 0.2 0.1 - 1.5 mg/dL    Albumin 4.0 3.2 - 4.9 g/dL    Total Protein 6.0 6.0 - 8.2 g/dL     Globulin 2.0 1.9 - 3.5 g/dL    A-G Ratio 2.0 g/dL   BETA-HCG QUALITATIVE SERUM   Result Value Ref Range    Beta-Hcg Qualitative Serum Negative Negative   COV-2, FLU A/B, AND RSV BY PCR (2-4 HOURS [facility]): Collect NP swab in VTM    Specimen: Respirate   Result Value Ref Range    SARS-CoV-2 Source NP Swab    ESTIMATED GFR   Result Value Ref Range    GFR If African American >60 >60 mL/min/1.73 m 2    GFR If Non African American >60 >60 mL/min/1.73 m 2     *Note: Due to a large number of results and/or encounters for the requested time period, some results have not been displayed. A complete set of results can be found in Results Review.           PROCEDURES     MEDICAL RECORD  I have reviewed patient's medical record and pertinent results are listed.    COURSE & MEDICAL DECISION MAKING  I have reviewed any medical record information, laboratory studies and radiographic results as noted above.    The pt will be admitted to the hospitalist     I spoke with Dr. Yousif who would like the pt admitted, to have an MRI of the brain with/without contrast, and an MRI of the orbits, with and without contrast. He would like the hospitalist to contact him when she is admitted, to discuss the pts case.        FINAL IMPRESSION  Optic neuritis -chronic       Electronically signed by: Valente Castellon D.O., 6/4/2021 6:43 PM

## 2021-06-06 ENCOUNTER — APPOINTMENT (OUTPATIENT)
Dept: RADIOLOGY | Facility: MEDICAL CENTER | Age: 31
DRG: 057 | End: 2021-06-06
Attending: STUDENT IN AN ORGANIZED HEALTH CARE EDUCATION/TRAINING PROGRAM
Payer: MEDICARE

## 2021-06-06 LAB
ALBUMIN SERPL BCP-MCNC: 3.5 G/DL (ref 3.2–4.9)
ALBUMIN/GLOB SERPL: 1.8 G/DL
ALP SERPL-CCNC: 128 U/L (ref 30–99)
ALT SERPL-CCNC: 432 U/L (ref 2–50)
ANION GAP SERPL CALC-SCNC: 11 MMOL/L (ref 7–16)
AST SERPL-CCNC: 263 U/L (ref 12–45)
BASOPHILS # BLD AUTO: 0.9 % (ref 0–1.8)
BASOPHILS # BLD: 0.03 K/UL (ref 0–0.12)
BILIRUB SERPL-MCNC: 0.3 MG/DL (ref 0.1–1.5)
BUN SERPL-MCNC: 8 MG/DL (ref 8–22)
CALCIUM SERPL-MCNC: 9.1 MG/DL (ref 8.5–10.5)
CHLORIDE SERPL-SCNC: 109 MMOL/L (ref 96–112)
CO2 SERPL-SCNC: 19 MMOL/L (ref 20–33)
CREAT SERPL-MCNC: 0.66 MG/DL (ref 0.5–1.4)
CRP SERPL HS-MCNC: 0.63 MG/DL (ref 0–0.75)
EOSINOPHIL # BLD AUTO: 0.1 K/UL (ref 0–0.51)
EOSINOPHIL NFR BLD: 2.9 % (ref 0–6.9)
ERYTHROCYTE [DISTWIDTH] IN BLOOD BY AUTOMATED COUNT: 45.6 FL (ref 35.9–50)
ERYTHROCYTE [SEDIMENTATION RATE] IN BLOOD BY WESTERGREN METHOD: 16 MM/HOUR (ref 0–25)
GLOBULIN SER CALC-MCNC: 2 G/DL (ref 1.9–3.5)
GLUCOSE SERPL-MCNC: 96 MG/DL (ref 65–99)
HCT VFR BLD AUTO: 38.7 % (ref 37–47)
HGB BLD-MCNC: 12.6 G/DL (ref 12–16)
IMM GRANULOCYTES # BLD AUTO: 0.03 K/UL (ref 0–0.11)
IMM GRANULOCYTES NFR BLD AUTO: 0.9 % (ref 0–0.9)
LYMPHOCYTES # BLD AUTO: 1.04 K/UL (ref 1–4.8)
LYMPHOCYTES NFR BLD: 29.9 % (ref 22–41)
MCH RBC QN AUTO: 29.2 PG (ref 27–33)
MCHC RBC AUTO-ENTMCNC: 32.6 G/DL (ref 33.6–35)
MCV RBC AUTO: 89.8 FL (ref 81.4–97.8)
MONOCYTES # BLD AUTO: 0.25 K/UL (ref 0–0.85)
MONOCYTES NFR BLD AUTO: 7.2 % (ref 0–13.4)
NEUTROPHILS # BLD AUTO: 2.03 K/UL (ref 2–7.15)
NEUTROPHILS NFR BLD: 58.2 % (ref 44–72)
NRBC # BLD AUTO: 0 K/UL
NRBC BLD-RTO: 0 /100 WBC
PLATELET # BLD AUTO: 182 K/UL (ref 164–446)
PMV BLD AUTO: 11.6 FL (ref 9–12.9)
POTASSIUM SERPL-SCNC: 5 MMOL/L (ref 3.6–5.5)
PROCALCITONIN SERPL-MCNC: <0.05 NG/ML
PROT SERPL-MCNC: 5.5 G/DL (ref 6–8.2)
RBC # BLD AUTO: 4.31 M/UL (ref 4.2–5.4)
SODIUM SERPL-SCNC: 139 MMOL/L (ref 135–145)
WBC # BLD AUTO: 3.5 K/UL (ref 4.8–10.8)

## 2021-06-06 PROCEDURE — 700102 HCHG RX REV CODE 250 W/ 637 OVERRIDE(OP): Performed by: STUDENT IN AN ORGANIZED HEALTH CARE EDUCATION/TRAINING PROGRAM

## 2021-06-06 PROCEDURE — 84145 PROCALCITONIN (PCT): CPT

## 2021-06-06 PROCEDURE — 770006 HCHG ROOM/CARE - MED/SURG/GYN SEMI*

## 2021-06-06 PROCEDURE — 80053 COMPREHEN METABOLIC PANEL: CPT

## 2021-06-06 PROCEDURE — 700111 HCHG RX REV CODE 636 W/ 250 OVERRIDE (IP): Performed by: STUDENT IN AN ORGANIZED HEALTH CARE EDUCATION/TRAINING PROGRAM

## 2021-06-06 PROCEDURE — 85652 RBC SED RATE AUTOMATED: CPT

## 2021-06-06 PROCEDURE — 99233 SBSQ HOSP IP/OBS HIGH 50: CPT | Performed by: STUDENT IN AN ORGANIZED HEALTH CARE EDUCATION/TRAINING PROGRAM

## 2021-06-06 PROCEDURE — 72156 MRI NECK SPINE W/O & W/DYE: CPT | Mod: MH

## 2021-06-06 PROCEDURE — 72158 MRI LUMBAR SPINE W/O & W/DYE: CPT | Mod: MH

## 2021-06-06 PROCEDURE — 85025 COMPLETE CBC W/AUTO DIFF WBC: CPT

## 2021-06-06 PROCEDURE — A9576 INJ PROHANCE MULTIPACK: HCPCS | Performed by: STUDENT IN AN ORGANIZED HEALTH CARE EDUCATION/TRAINING PROGRAM

## 2021-06-06 PROCEDURE — 72157 MRI CHEST SPINE W/O & W/DYE: CPT | Mod: MH

## 2021-06-06 PROCEDURE — 700105 HCHG RX REV CODE 258: Performed by: STUDENT IN AN ORGANIZED HEALTH CARE EDUCATION/TRAINING PROGRAM

## 2021-06-06 PROCEDURE — A9270 NON-COVERED ITEM OR SERVICE: HCPCS | Performed by: STUDENT IN AN ORGANIZED HEALTH CARE EDUCATION/TRAINING PROGRAM

## 2021-06-06 PROCEDURE — 700101 HCHG RX REV CODE 250: Performed by: STUDENT IN AN ORGANIZED HEALTH CARE EDUCATION/TRAINING PROGRAM

## 2021-06-06 PROCEDURE — 86140 C-REACTIVE PROTEIN: CPT

## 2021-06-06 PROCEDURE — 97162 PT EVAL MOD COMPLEX 30 MIN: CPT

## 2021-06-06 PROCEDURE — 94640 AIRWAY INHALATION TREATMENT: CPT

## 2021-06-06 PROCEDURE — 36415 COLL VENOUS BLD VENIPUNCTURE: CPT

## 2021-06-06 PROCEDURE — 70543 MRI ORBT/FAC/NCK W/O &W/DYE: CPT | Mod: MG

## 2021-06-06 PROCEDURE — 86038 ANTINUCLEAR ANTIBODIES: CPT

## 2021-06-06 PROCEDURE — 700117 HCHG RX CONTRAST REV CODE 255: Performed by: STUDENT IN AN ORGANIZED HEALTH CARE EDUCATION/TRAINING PROGRAM

## 2021-06-06 PROCEDURE — 70553 MRI BRAIN STEM W/O & W/DYE: CPT | Mod: MG

## 2021-06-06 RX ORDER — METHYLPREDNISOLONE SODIUM SUCCINATE 40 MG/ML
250 INJECTION, POWDER, LYOPHILIZED, FOR SOLUTION INTRAMUSCULAR; INTRAVENOUS DAILY
Status: DISCONTINUED | OUTPATIENT
Start: 2021-06-06 | End: 2021-06-06

## 2021-06-06 RX ADMIN — MORPHINE SULFATE 2 MG: 4 INJECTION INTRAVENOUS at 05:03

## 2021-06-06 RX ADMIN — MORPHINE SULFATE 2 MG: 4 INJECTION INTRAVENOUS at 20:43

## 2021-06-06 RX ADMIN — ZIPRASIDONE HYDROCHLORIDE 40 MG: 40 CAPSULE ORAL at 16:53

## 2021-06-06 RX ADMIN — BACLOFEN 10 MG: 10 TABLET ORAL at 12:43

## 2021-06-06 RX ADMIN — ENOXAPARIN SODIUM 40 MG: 40 INJECTION SUBCUTANEOUS at 04:43

## 2021-06-06 RX ADMIN — ASPIRIN 81 MG: 81 TABLET, COATED ORAL at 04:43

## 2021-06-06 RX ADMIN — BACLOFEN 10 MG: 10 TABLET ORAL at 16:53

## 2021-06-06 RX ADMIN — TRAZODONE HYDROCHLORIDE 100 MG: 100 TABLET ORAL at 21:29

## 2021-06-06 RX ADMIN — MYCOPHENOLATE MOFETIL 500 MG: 250 CAPSULE ORAL at 21:28

## 2021-06-06 RX ADMIN — FERROUS SULFATE TAB 325 MG (65 MG ELEMENTAL FE) 325 MG: 325 (65 FE) TAB at 08:21

## 2021-06-06 RX ADMIN — IVABRADINE 7.5 MG: 7.5 TABLET, FILM COATED ORAL at 16:54

## 2021-06-06 RX ADMIN — IPRATROPIUM BROMIDE AND ALBUTEROL SULFATE 3 ML: .5; 2.5 SOLUTION RESPIRATORY (INHALATION) at 03:32

## 2021-06-06 RX ADMIN — MORPHINE SULFATE 2 MG: 4 INJECTION INTRAVENOUS at 10:16

## 2021-06-06 RX ADMIN — Medication 10 MG: at 21:29

## 2021-06-06 RX ADMIN — BUSPIRONE HYDROCHLORIDE 30 MG: 10 TABLET ORAL at 21:29

## 2021-06-06 RX ADMIN — TOPIRAMATE 50 MG: 25 TABLET, FILM COATED ORAL at 08:21

## 2021-06-06 RX ADMIN — OXCARBAZEPINE 300 MG: 300 TABLET, FILM COATED ORAL at 16:53

## 2021-06-06 RX ADMIN — MYCOPHENOLATE MOFETIL 500 MG: 250 CAPSULE ORAL at 08:21

## 2021-06-06 RX ADMIN — BACLOFEN 10 MG: 10 TABLET ORAL at 04:43

## 2021-06-06 RX ADMIN — PREGABALIN 300 MG: 150 CAPSULE ORAL at 16:53

## 2021-06-06 RX ADMIN — MORPHINE SULFATE 2 MG: 4 INJECTION INTRAVENOUS at 13:59

## 2021-06-06 RX ADMIN — ALBUTEROL SULFATE 2 PUFF: 90 AEROSOL, METERED RESPIRATORY (INHALATION) at 02:56

## 2021-06-06 RX ADMIN — OXCARBAZEPINE 300 MG: 300 TABLET, FILM COATED ORAL at 21:28

## 2021-06-06 RX ADMIN — MORPHINE SULFATE 2 MG: 4 INJECTION INTRAVENOUS at 00:23

## 2021-06-06 RX ADMIN — IVABRADINE 7.5 MG: 7.5 TABLET, FILM COATED ORAL at 08:20

## 2021-06-06 RX ADMIN — MORPHINE SULFATE 2 MG: 4 INJECTION INTRAVENOUS at 17:03

## 2021-06-06 RX ADMIN — METHOCARBAMOL 750 MG: 750 TABLET ORAL at 21:28

## 2021-06-06 RX ADMIN — FLUOXETINE 40 MG: 20 CAPSULE ORAL at 04:42

## 2021-06-06 RX ADMIN — BUSPIRONE HYDROCHLORIDE 30 MG: 10 TABLET ORAL at 08:20

## 2021-06-06 RX ADMIN — TOPIRAMATE 50 MG: 25 TABLET, FILM COATED ORAL at 21:29

## 2021-06-06 RX ADMIN — ACETAMINOPHEN 650 MG: 325 TABLET, FILM COATED ORAL at 08:21

## 2021-06-06 RX ADMIN — LEVOTHYROXINE SODIUM 100 MCG: 0.1 TABLET ORAL at 04:43

## 2021-06-06 RX ADMIN — OXCARBAZEPINE 300 MG: 300 TABLET, FILM COATED ORAL at 08:20

## 2021-06-06 RX ADMIN — SODIUM CHLORIDE 250 MG: 9 INJECTION, SOLUTION INTRAVENOUS at 21:30

## 2021-06-06 RX ADMIN — GADOTERIDOL 20 ML: 279.3 INJECTION, SOLUTION INTRAVENOUS at 19:26

## 2021-06-06 RX ADMIN — PREGABALIN 300 MG: 150 CAPSULE ORAL at 04:42

## 2021-06-06 RX ADMIN — ZIPRASIDONE HYDROCHLORIDE 40 MG: 40 CAPSULE ORAL at 04:42

## 2021-06-06 ASSESSMENT — COGNITIVE AND FUNCTIONAL STATUS - GENERAL
TURNING FROM BACK TO SIDE WHILE IN FLAT BAD: A LITTLE
DRESSING REGULAR LOWER BODY CLOTHING: A LOT
DRESSING REGULAR UPPER BODY CLOTHING: A LOT
CLIMB 3 TO 5 STEPS WITH RAILING: A LOT
WALKING IN HOSPITAL ROOM: A LOT
MOBILITY SCORE: 13
DAILY ACTIVITIY SCORE: 12
SUGGESTED CMS G CODE MODIFIER MOBILITY: CL
SUGGESTED CMS G CODE MODIFIER DAILY ACTIVITY: CL
MOVING TO AND FROM BED TO CHAIR: A LITTLE
MOVING FROM LYING ON BACK TO SITTING ON SIDE OF FLAT BED: A LOT
HELP NEEDED FOR BATHING: A LOT
WALKING IN HOSPITAL ROOM: A LOT
MOBILITY SCORE: 12
EATING MEALS: A LOT
PERSONAL GROOMING: A LOT
SUGGESTED CMS G CODE MODIFIER MOBILITY: CL
MOVING TO AND FROM BED TO CHAIR: A LOT
STANDING UP FROM CHAIR USING ARMS: A LOT
TOILETING: A LOT
MOVING FROM LYING ON BACK TO SITTING ON SIDE OF FLAT BED: UNABLE
STANDING UP FROM CHAIR USING ARMS: A LOT
CLIMB 3 TO 5 STEPS WITH RAILING: TOTAL
TURNING FROM BACK TO SIDE WHILE IN FLAT BAD: A LITTLE

## 2021-06-06 ASSESSMENT — ENCOUNTER SYMPTOMS
DOUBLE VISION: 0
FEVER: 0
ABDOMINAL PAIN: 0
VOMITING: 0
EYE DISCHARGE: 0
HEADACHES: 1
COUGH: 0
WHEEZING: 0
NAUSEA: 1
EYE PAIN: 1
TREMORS: 0
BLURRED VISION: 0
BLOOD IN STOOL: 0
HEMOPTYSIS: 0
CHILLS: 0
NECK PAIN: 0
BACK PAIN: 0
SHORTNESS OF BREATH: 0
PALPITATIONS: 0
FLANK PAIN: 0
DIARRHEA: 0
SENSORY CHANGE: 0
LOSS OF CONSCIOUSNESS: 0

## 2021-06-06 ASSESSMENT — PAIN DESCRIPTION - PAIN TYPE
TYPE: ACUTE PAIN

## 2021-06-06 ASSESSMENT — GAIT ASSESSMENTS
GAIT LEVEL OF ASSIST: MODERATE ASSIST
DISTANCE (FEET): 3
ASSISTIVE DEVICE: FRONT WHEEL WALKER

## 2021-06-06 NOTE — ASSESSMENT & PLAN NOTE
History of low transverse myelitis  On sacral neurostimulator for urinary and fecal incontinence     PT OT evaluated recommended SNF

## 2021-06-06 NOTE — FACE TO FACE
Face to Face Supporting Documentation - Home Health    The encounter with this patient was in whole or in part the primary reason for home health admission.    Date of encounter:   Patient:                    MRN:                       YOB: 2022  Kristin Balderrama  [id number]  1990     Home health to see patient for:  Physical Therapy evaluation and treatment and Occupational therapy evaluation and treatment    Skilled need for:  Comment: optic neuritis    Skilled nursing interventions to include:  Comment: pt ot    Homebound status evidenced by:  Have a condition such that leaving his or her home is medically contraindicated. Leaving home requires a considerable and taxing effort. There is a normal inability to leave the home.    Community Physician to provide follow up care: Torres Brody M.D.     Optional Interventions? No      I certify the face to face encounter for this home health care referral meets the CMS requirements and the encounter/clinical assessment with the patient was, in whole, or in part, for the medical condition(s) listed above, which is the primary reason for home health care. Based on my clinical findings: the service(s) are medically necessary, support the need for home health care, and the homebound criteria are met.  I certify that this patient has had a face to face encounter by myself.  Veronica Worthy M.D. - NPI: [id number]

## 2021-06-06 NOTE — PROGRESS NOTES
Hospital Medicine Daily Progress Note    Date of Service  6/6/2021    Chief Complaint  30 y.o. female admitted 6/4/2021 with eye pain    Hospital Course  30 y.o. female past medical history of schizophrenia, TONYA, optic neuritis, normal pressure hydrocephalus, transverse myelitis, scoliosis, who presented 6/4/2021 for evaluation of worsening bilateral eye pain.   She sees Dr Yousif, neuro-ophthalmology who advised her to come in.  The pt has no changes to her vision. No cp, no sob, no vomiting.  She has no fall or trauma this week however, describes multiple falls in the past few months    Optic neuritis, multiple falls since January     Dr. Zee, Neuropthalmologist is consulted in the emergency department.     Patient is admitted to hospitalist service for medical management.    Interval Problem Update  Patient seems confused this morning when I woke her up.  She asked me where she is and whether she is going intermediate.  She said that she had a bed dream.  She reported blurring vision.  She can only see my fingers clearly when it was 1 to 2 inches away for both eyes.  She felt nausea, headache, and more eye pain when she tried to see my fingers better.  Per the notes, she had normal vision before admission.     I called Neuro-ophthalmologist Dr. Ramirez informed of the changes, #[phone]. Dr. Ramirez talked to the patient on the phone as well.  MRI brain and orbit are still pending.  Will change to start order. Dr. Ramirez recommended Solu-Medrol 250 mg daily for 3 days after MRI done.    Dr. Ramirez also recommended to order MRI with and without contrast for cervical, thoracic, and lumbar.    TSH 3.1   A1c 5.4    Patient was evaluated and examined at bedside.  Labs and imagings were reviewed. Case discussed with nursing, CM,  nurse, pharmacy in IDT rounds. Care plan was updated with the patient.      Consultants/Specialty  Neuro-ophthalmologist, Dr. Ramirez  #[phone].    Code  Status  Full Code    Disposition  SNF    Review of Systems  Review of Systems   Constitutional: Positive for malaise/fatigue. Negative for chills and fever.   HENT: Negative for ear discharge and ear pain.    Eyes: Positive for pain. Negative for blurred vision, double vision and discharge.   Respiratory: Negative for cough, hemoptysis, shortness of breath and wheezing.    Cardiovascular: Negative for chest pain and palpitations.   Gastrointestinal: Positive for nausea. Negative for abdominal pain, blood in stool, diarrhea and vomiting.   Genitourinary: Negative for dysuria, flank pain, hematuria and urgency.   Musculoskeletal: Negative for back pain and neck pain.   Neurological: Positive for headaches. Negative for tremors, sensory change and loss of consciousness.        Physical Exam  Temp:  [36.6 °C (97.8 °F)-36.9 °C (98.4 °F)] 36.9 °C (98.4 °F)  Pulse:  [58-74] 74  Resp:  [17-19] 17  BP: ()/(40-81) 101/50  SpO2:  [93 %-98 %] 93 %    Physical Exam  Constitutional:       General: She is not in acute distress.     Appearance: Normal appearance. She is not ill-appearing.   HENT:      Head: Normocephalic and atraumatic.      Right Ear: External ear normal.      Left Ear: External ear normal.      Nose: Nose normal. No congestion or rhinorrhea.      Mouth/Throat:      Mouth: Mucous membranes are moist.   Eyes:      Extraocular Movements: Extraocular movements intact.      Pupils: Pupils are equal, round, and reactive to light.   Cardiovascular:      Rate and Rhythm: Regular rhythm.      Pulses: Normal pulses.      Heart sounds: Normal heart sounds.   Pulmonary:      Effort: Pulmonary effort is normal. No respiratory distress.      Breath sounds: Normal breath sounds. No stridor. No wheezing, rhonchi or rales.   Chest:      Chest wall: No tenderness.   Abdominal:      General: There is no distension.      Palpations: Abdomen is soft.      Tenderness: There is no abdominal tenderness. There is no guarding or  rebound.   Musculoskeletal:         General: No swelling or tenderness. Normal range of motion.      Cervical back: Normal range of motion and neck supple.   Skin:     General: Skin is warm and dry.   Neurological:      General: No focal deficit present.      Mental Status: She is alert and oriented to person, place, and time.      Sensory: No sensory deficit.      Motor: No weakness.      Coordination: Coordination normal.   Psychiatric:         Mood and Affect: Mood normal.         Fluids    Intake/Output Summary (Last 24 hours) at 6/6/2021 1355  Last data filed at 6/6/2021 0615  Gross per 24 hour   Intake --   Output 700 ml   Net -700 ml       Laboratory  Recent Labs     06/04/21  1809 06/05/21  0403 06/06/21  1140   WBC 5.6 3.8* 3.5*   RBC 4.45 4.15* 4.31   HEMOGLOBIN 12.9 12.0 12.6   HEMATOCRIT 40.8 38.2 38.7   MCV 91.7 92.0 89.8   MCH 29.0 28.9 29.2   MCHC 31.6* 31.4* 32.6*   RDW 48.9 48.3 45.6   PLATELETCT 209 168 182   MPV 11.7 12.0 11.6     Recent Labs     06/04/21  1809 06/05/21  0403 06/06/21  0221   SODIUM 141 136 139   POTASSIUM 4.1 4.0 5.0   CHLORIDE 111 107 109   CO2 21 18* 19*   GLUCOSE 95 97 96   BUN 12 10 8   CREATININE 0.75 0.80 0.66   CALCIUM 8.9 9.1 9.1                   Imaging  MR-BRAIN-WITH & W/O    (Results Pending)   MR-ORBITS,FACE,NECK-WITH&W/O & SEQUENCES    (Results Pending)   MR-LUMBAR SPINE-WITH & W/O    (Results Pending)   MR-CERVICAL SPINE-WITH & W/O    (Results Pending)   MR-THORACIC SPINE-WITH & W/O    (Results Pending)        Assessment/Plan  * Optic neuritis- (present on admission)  Assessment & Plan  MRI orbits with and without contrast, MRI brain with and without contrast ordered  Patient reported the blurring vision, which is new for her    I called Neuro-ophthalmologist Dr. Ramirez informed of the changes, #[phone]. Dr. Ramirez talked to the patient on the phone as well.  MRI brain and orbit are still pending.  Will change to start order.   Dr. Ramirez also  recommended to order MRI with and without contrast for cervical, thoracic, and lumbar.    Dr. Ramirez recommended Solu-Medrol 250 mg daily for 3 days after MRI done.  Ordered      Normal pressure hydrocephalus (HCC)- (present on admission)  Assessment & Plan  Worsening eye pain and multiple falls  Fall precautions in place  MRI brain with and without contrast ordered  Consulted with Dr. Newton in a.m.    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HCC)- (present on admission)  Assessment & Plan  History of low transverse myelitis  PT OT evaluated recommended SNF    Hypothyroidism- (present on admission)  Assessment & Plan  Continue home meds  TSH ordered to assess    Schizophrenia (HCC)- (present on admission)  Assessment & Plan  Continue home medications      Hyperglycemia- (present on admission)  Assessment & Plan  A1c seem to be normal at 5.4, glucose 95 on admission  Patient describes history of hyperglycemia       VTE prophylaxis: Lovenox

## 2021-06-06 NOTE — CARE PLAN
The patient is Stable - Low risk of patient condition declining or worsening    Shift Goals  Clinical Goals: Maintain safety, pain control  Patient Goals: pain control    Progress made toward(s) clinical / shift goals:  Fall precautions in place, use pharm and non pharm interventions for pain control.     Patient is not progressing towards the following goals:

## 2021-06-06 NOTE — THERAPY
Physical Therapy   Initial Evaluation     Patient Name: Kristin Balderrama  Age:  30 y.o., Sex:  female  Medical Record #: [id number]  Today's Date: 6/6/2021     Precautions: (P) Fall Risk    Assessment  Patient is 30 y.o. female with bilat eye pain, blurred vision, double vision and falls at home including GLF 6/4/21.  Other PMH includes schizophrenia, borderline personality disorder, depression, normal pressure hydrocephalus, optic neuritis, transverse myelitis, scoliosis, afib, DM, asthma, hypothyroidism, TONYA. She has 2 surgical boots and reports she is to wear them with all transfers and gait due to torn muscles in her feet. Pt lives in a single level home with her grandmother, who helps as needed with self care and IADLs. Pt's aunt lives on property and also assists. Pt reports she can usually perform her own basic self care, but has had problems recently due to her eye pain and LE weakness. She has had increasing falls since January. Today, pt demonstrated unsafe gait with FWW with shaky LEs and high risk for falls. She demonstrates decreased LE strength and activity tolerance. Pt also appears to have some self limiting behaviors and appreciates assistance from her grandmother. Pt will continue to benefit from skilled PT to address deficits while she remains in acute setting. Recommend further PT in the post-acute setting prior to D/C home.    Plan    Recommend Physical Therapy 4 times per week until therapy goals are met for the following treatments:  Bed Mobility, Gait Training, Neuro Re-Education / Balance, Stair Training, Therapeutic Activities and Therapeutic Exercises    DC Equipment Recommendations: (P) Unable to determine at this time  Discharge Recommendations: (P) Recommend post-acute placement for additional physical therapy services prior to discharge home       Subjective    Pt reporting she doesn't know what is going on with her eyes and wants to know what the problem is.     Objective        06/06/21 1112   Prior Living Situation   Prior Services Intermittent Physical Support for ADL Per Family   Housing / Facility 1 Story House   Steps Into Home   (ramp)   Steps In Home 0   Bathroom Set up Tub Transfer Bench;Bathtub / Shower Combination   Equipment Owned Front-Wheel Walker;4-Wheel Walker;Single Point Cane;Wheelchair;Tub Transfer Bench;Bed Side Commode   Lives with - Patient's Self Care Capacity   (grandmother; aunt lives on property)   Comments pt reports she has intermittent assistance as needed from grandmother and aunt, including occassionally with dressing, bathing   Prior Level of Functional Mobility   Bed Mobility Independent   Transfer Status Independent   Ambulation Independent   Distance Ambulation (Feet)   (household)   Assistive Devices Used   (variable device)   History of Falls   History of Falls Yes   Date of Last Fall 06/04/21   Cognition    Level of Consciousness Alert   Comments cooperative, talkative   Strength Lower Body   Comments reports foot drop bilat ankles; able to perform knee extension in sitting through partial ROM only; unable to perform SLR with boot from recliner   Vision   Vision Comments reports bilat eye pain, blurriness, double vision   Balance Assessment   Sitting Balance (Static) Fair   Sitting Balance (Dynamic) Fair   Standing Balance (Static) Fair -   Standing Balance (Dynamic) Poor +   Weight Shift Sitting Fair   Weight Shift Standing Poor   Comments with FWW, bilat surgical boots   Gait Analysis   Gait Level Of Assist Moderate Assist   Assistive Device Front Wheel Walker   Distance (Feet) 3   # of Times Distance was Traveled 1   Deviation Ataxic;Step To;Increased Base Of Support;Shuffled Gait;Decreased Heel Strike;Decreased Toe Off  (unsafe, shaky)   # of Stairs Climbed 0   Weight Bearing Status no restriction   Comments shaky with gait, unsteady; return to chair due to safety concern and risk for falls   Bed Mobility    Comments pt up in chair   Functional  Mobility   Sit to Stand Minimal Assist  (CGA)   Transfer Method Stand Step   Mobility recliner->stand->amb->sit->reclined   Comments unsteady   How much difficulty does the patient currently have...   Turning over in bed (including adjusting bedclothes, sheets and blankets)? 3   Sitting down on and standing up from a chair with arms (e.g., wheelchair, bedside commode, etc.) 1   Moving from lying on back to sitting on the side of the bed? 3   How much help from another person does the patient currently need...   Moving to and from a bed to a chair (including a wheelchair)? 2   Need to walk in a hospital room? 2   Climbing 3-5 steps with a railing? 1   6 clicks Mobility Score 12   Activity Tolerance   Sitting in Chair left up in chair   Sitting Edge of Bed NT   Standing 2-3 minutes approx   Comments LE fatigue, weakness with standing   Patient / Family Goals    Patient / Family Goal #1 find on what is wrong with me   Short Term Goals    Short Term Goal # 1 Pt will perform bed mobility with supv within 6 visits in order to return home   Short Term Goal # 2 Pt will transfers bed<->chair with LRAD and supv within 6 visits in order to return home   Short Term Goal # 3 Pt will ambulate 25' with LRAD and supv within 6 visits in order to return home   Education Group   Education Provided Role of Physical Therapist   Role of Physical Therapist Patient Response Patient;Acceptance;Explanation;Action Demonstration   Additional Comments D/C recommendation to pt and grandmother   Problem List    Problems Pain;Impaired Bed Mobility;Impaired Transfers;Impaired Ambulation;Functional Strength Deficit;Impaired Balance;Impaired Coordination;Decreased Activity Tolerance   Anticipated Discharge Equipment and Recommendations   DC Equipment Recommendations Unable to determine at this time   Discharge Recommendations Recommend post-acute placement for additional physical therapy services prior to discharge home

## 2021-06-07 LAB
ALBUMIN SERPL BCP-MCNC: 4.1 G/DL (ref 3.2–4.9)
ALBUMIN/GLOB SERPL: 2 G/DL
ALP SERPL-CCNC: 121 U/L (ref 30–99)
ALT SERPL-CCNC: 296 U/L (ref 2–50)
ANION GAP SERPL CALC-SCNC: 11 MMOL/L (ref 7–16)
AST SERPL-CCNC: 73 U/L (ref 12–45)
BASOPHILS # BLD AUTO: 0.1 % (ref 0–1.8)
BASOPHILS # BLD: 0.01 K/UL (ref 0–0.12)
BILIRUB SERPL-MCNC: 0.2 MG/DL (ref 0.1–1.5)
BUN SERPL-MCNC: 10 MG/DL (ref 8–22)
CALCIUM SERPL-MCNC: 9.3 MG/DL (ref 8.5–10.5)
CHLORIDE SERPL-SCNC: 105 MMOL/L (ref 96–112)
CO2 SERPL-SCNC: 18 MMOL/L (ref 20–33)
CREAT SERPL-MCNC: 0.63 MG/DL (ref 0.5–1.4)
EOSINOPHIL # BLD AUTO: 0.04 K/UL (ref 0–0.51)
EOSINOPHIL NFR BLD: 0.6 % (ref 0–6.9)
ERYTHROCYTE [DISTWIDTH] IN BLOOD BY AUTOMATED COUNT: 45.4 FL (ref 35.9–50)
GLOBULIN SER CALC-MCNC: 2.1 G/DL (ref 1.9–3.5)
GLUCOSE SERPL-MCNC: 171 MG/DL (ref 65–99)
HCT VFR BLD AUTO: 39.8 % (ref 37–47)
HGB BLD-MCNC: 13.1 G/DL (ref 12–16)
IMM GRANULOCYTES # BLD AUTO: 0.04 K/UL (ref 0–0.11)
IMM GRANULOCYTES NFR BLD AUTO: 0.6 % (ref 0–0.9)
LYMPHOCYTES # BLD AUTO: 0.63 K/UL (ref 1–4.8)
LYMPHOCYTES NFR BLD: 8.7 % (ref 22–41)
MCH RBC QN AUTO: 29.6 PG (ref 27–33)
MCHC RBC AUTO-ENTMCNC: 32.9 G/DL (ref 33.6–35)
MCV RBC AUTO: 90 FL (ref 81.4–97.8)
MONOCYTES # BLD AUTO: 0.11 K/UL (ref 0–0.85)
MONOCYTES NFR BLD AUTO: 1.5 % (ref 0–13.4)
NEUTROPHILS # BLD AUTO: 6.38 K/UL (ref 2–7.15)
NEUTROPHILS NFR BLD: 88.5 % (ref 44–72)
NRBC # BLD AUTO: 0 K/UL
NRBC BLD-RTO: 0 /100 WBC
PLATELET # BLD AUTO: 176 K/UL (ref 164–446)
PMV BLD AUTO: 11.6 FL (ref 9–12.9)
POTASSIUM SERPL-SCNC: 4 MMOL/L (ref 3.6–5.5)
PROT SERPL-MCNC: 6.2 G/DL (ref 6–8.2)
RBC # BLD AUTO: 4.42 M/UL (ref 4.2–5.4)
SODIUM SERPL-SCNC: 134 MMOL/L (ref 135–145)
WBC # BLD AUTO: 7.2 K/UL (ref 4.8–10.8)

## 2021-06-07 PROCEDURE — 770006 HCHG ROOM/CARE - MED/SURG/GYN SEMI*

## 2021-06-07 PROCEDURE — 700105 HCHG RX REV CODE 258: Performed by: STUDENT IN AN ORGANIZED HEALTH CARE EDUCATION/TRAINING PROGRAM

## 2021-06-07 PROCEDURE — 700111 HCHG RX REV CODE 636 W/ 250 OVERRIDE (IP): Performed by: STUDENT IN AN ORGANIZED HEALTH CARE EDUCATION/TRAINING PROGRAM

## 2021-06-07 PROCEDURE — 99233 SBSQ HOSP IP/OBS HIGH 50: CPT | Performed by: STUDENT IN AN ORGANIZED HEALTH CARE EDUCATION/TRAINING PROGRAM

## 2021-06-07 PROCEDURE — 80053 COMPREHEN METABOLIC PANEL: CPT

## 2021-06-07 PROCEDURE — 85025 COMPLETE CBC W/AUTO DIFF WBC: CPT

## 2021-06-07 PROCEDURE — 700102 HCHG RX REV CODE 250 W/ 637 OVERRIDE(OP): Performed by: STUDENT IN AN ORGANIZED HEALTH CARE EDUCATION/TRAINING PROGRAM

## 2021-06-07 PROCEDURE — A9270 NON-COVERED ITEM OR SERVICE: HCPCS | Performed by: STUDENT IN AN ORGANIZED HEALTH CARE EDUCATION/TRAINING PROGRAM

## 2021-06-07 PROCEDURE — 36415 COLL VENOUS BLD VENIPUNCTURE: CPT

## 2021-06-07 RX ORDER — MORPHINE SULFATE 4 MG/ML
1 INJECTION, SOLUTION INTRAMUSCULAR; INTRAVENOUS ONCE
Status: COMPLETED | OUTPATIENT
Start: 2021-06-07 | End: 2021-06-07

## 2021-06-07 RX ADMIN — BUSPIRONE HYDROCHLORIDE 30 MG: 10 TABLET ORAL at 20:16

## 2021-06-07 RX ADMIN — IVABRADINE 7.5 MG: 7.5 TABLET, FILM COATED ORAL at 18:26

## 2021-06-07 RX ADMIN — SODIUM CHLORIDE, POTASSIUM CHLORIDE, SODIUM LACTATE AND CALCIUM CHLORIDE: 600; 310; 30; 20 INJECTION, SOLUTION INTRAVENOUS at 00:00

## 2021-06-07 RX ADMIN — TOPIRAMATE 50 MG: 25 TABLET, FILM COATED ORAL at 20:17

## 2021-06-07 RX ADMIN — MYCOPHENOLATE MOFETIL 500 MG: 250 CAPSULE ORAL at 20:17

## 2021-06-07 RX ADMIN — BUSPIRONE HYDROCHLORIDE 30 MG: 10 TABLET ORAL at 08:11

## 2021-06-07 RX ADMIN — MORPHINE SULFATE 2 MG: 4 INJECTION INTRAVENOUS at 04:57

## 2021-06-07 RX ADMIN — ENOXAPARIN SODIUM 40 MG: 40 INJECTION SUBCUTANEOUS at 04:58

## 2021-06-07 RX ADMIN — TOPIRAMATE 50 MG: 25 TABLET, FILM COATED ORAL at 08:11

## 2021-06-07 RX ADMIN — MORPHINE SULFATE 2 MG: 4 INJECTION INTRAVENOUS at 14:03

## 2021-06-07 RX ADMIN — ZIPRASIDONE HYDROCHLORIDE 40 MG: 40 CAPSULE ORAL at 18:24

## 2021-06-07 RX ADMIN — TRAZODONE HYDROCHLORIDE 100 MG: 100 TABLET ORAL at 20:17

## 2021-06-07 RX ADMIN — LEVOTHYROXINE SODIUM 100 MCG: 0.1 TABLET ORAL at 04:58

## 2021-06-07 RX ADMIN — PREGABALIN 300 MG: 150 CAPSULE ORAL at 04:58

## 2021-06-07 RX ADMIN — MORPHINE SULFATE 1 MG: 4 INJECTION INTRAVENOUS at 18:17

## 2021-06-07 RX ADMIN — SODIUM CHLORIDE 250 MG: 9 INJECTION, SOLUTION INTRAVENOUS at 18:47

## 2021-06-07 RX ADMIN — MORPHINE SULFATE 2 MG: 4 INJECTION INTRAVENOUS at 08:08

## 2021-06-07 RX ADMIN — MORPHINE SULFATE 2 MG: 4 INJECTION INTRAVENOUS at 23:10

## 2021-06-07 RX ADMIN — FERROUS SULFATE TAB 325 MG (65 MG ELEMENTAL FE) 325 MG: 325 (65 FE) TAB at 08:10

## 2021-06-07 RX ADMIN — ZIPRASIDONE HYDROCHLORIDE 40 MG: 40 CAPSULE ORAL at 04:57

## 2021-06-07 RX ADMIN — PREGABALIN 300 MG: 150 CAPSULE ORAL at 18:18

## 2021-06-07 RX ADMIN — OXCARBAZEPINE 300 MG: 300 TABLET, FILM COATED ORAL at 20:18

## 2021-06-07 RX ADMIN — MORPHINE SULFATE 2 MG: 4 INJECTION INTRAVENOUS at 00:51

## 2021-06-07 RX ADMIN — BACLOFEN 10 MG: 10 TABLET ORAL at 18:18

## 2021-06-07 RX ADMIN — BACLOFEN 10 MG: 10 TABLET ORAL at 12:35

## 2021-06-07 RX ADMIN — MORPHINE SULFATE 2 MG: 4 INJECTION INTRAVENOUS at 11:01

## 2021-06-07 RX ADMIN — ASPIRIN 81 MG: 81 TABLET, COATED ORAL at 04:58

## 2021-06-07 RX ADMIN — FLUOXETINE 40 MG: 20 CAPSULE ORAL at 04:57

## 2021-06-07 RX ADMIN — BACLOFEN 10 MG: 10 TABLET ORAL at 04:58

## 2021-06-07 RX ADMIN — Medication 10 MG: at 20:17

## 2021-06-07 RX ADMIN — METHOCARBAMOL 750 MG: 750 TABLET ORAL at 20:17

## 2021-06-07 RX ADMIN — ONDANSETRON 4 MG: 2 INJECTION INTRAMUSCULAR; INTRAVENOUS at 13:42

## 2021-06-07 RX ADMIN — IVABRADINE 7.5 MG: 7.5 TABLET, FILM COATED ORAL at 08:09

## 2021-06-07 RX ADMIN — MORPHINE SULFATE 2 MG: 4 INJECTION INTRAVENOUS at 20:06

## 2021-06-07 RX ADMIN — MYCOPHENOLATE MOFETIL 500 MG: 250 CAPSULE ORAL at 08:11

## 2021-06-07 RX ADMIN — OXCARBAZEPINE 300 MG: 300 TABLET, FILM COATED ORAL at 08:08

## 2021-06-07 ASSESSMENT — ENCOUNTER SYMPTOMS
TREMORS: 0
BLOOD IN STOOL: 0
ABDOMINAL PAIN: 0
HEMOPTYSIS: 0
NAUSEA: 1
FEVER: 0
HEADACHES: 1
FLANK PAIN: 0
SENSORY CHANGE: 0
BLURRED VISION: 0
COUGH: 0
DOUBLE VISION: 0
LOSS OF CONSCIOUSNESS: 0
BACK PAIN: 0
DIARRHEA: 0
VOMITING: 0
CHILLS: 0
WHEEZING: 0
NECK PAIN: 0
SHORTNESS OF BREATH: 0
EYE DISCHARGE: 0
PALPITATIONS: 0
EYE PAIN: 1

## 2021-06-07 ASSESSMENT — PAIN DESCRIPTION - PAIN TYPE
TYPE: ACUTE PAIN

## 2021-06-07 NOTE — DIETARY
NUTRITION SERVICES: BMI - Pt with BMI >40 (=Body mass index is 42.19 kg/m².), Class III obesity. Weight loss counseling not appropriate in acute care setting. RECOMMEND - If appropriate at DC please refer to outpatient nutrition services for weight management. RD available PRN.

## 2021-06-07 NOTE — CARE PLAN
The patient is Watcher - Medium risk of patient condition declining or worsening    Shift Goals  Clinical Goals: Maintain safety, pain control  Patient Goals: Pain control, sleep    Progress made toward(s) clinical / shift goals:  Fall precautions in place. Educate patient not to get out of bed without assistance from staff. Use pharm and non pharm interventions for pain control.     Patient is not progressing towards the following goals:

## 2021-06-07 NOTE — PROGRESS NOTES
Hospital Medicine Daily Progress Note    Date of Service  6/7/2021    Chief Complaint  30 y.o. female admitted 6/4/2021 with eye pain    Hospital Course  30 y.o. female past medical history of schizophrenia, TONYA, optic neuritis, normal pressure hydrocephalus, transverse myelitis, scoliosis, who presented 6/4/2021 for evaluation of worsening bilateral eye pain.   She sees Dr Yousif, neuro-ophthalmology who advised her to come in.  The pt has no changes to her vision. No cp, no sob, no vomiting.  She has no fall or trauma this week however, describes multiple falls in the past few months    Optic neuritis, multiple falls since January     Dr. Zee, Neuropthalmologist is consulted in the emergency department.       Interval Problem Update  Patient still reported blurring vision and eye pain, no changes. Patient has said her vision has been very blurry, and only can see fingers when put 1 inch in front of her eyes. And eye pain with movement    MRI reviewed, MRI brain, MRI orbital, MRI cervical/thoracic/lumbar, with and without contrast where reviewed, negative for demyelination, tumor, infection, disc protrusion, stenosis. Negative for optic neuritis.   Positive for Schmorl's node endplate irregularities, unlikely the cause of patient's current eye pain and vision changes.      6/6 I called Neuro-ophthalmologist Dr. Ramirez, who recommended a full work-up with MRI  and Solu-Medrol 250 mg daily for 3 days.     Dr. Hart will be back to town tomorrow 6/8, please contact him for further recommendations    TSH 3.1   A1c 5.4    Patient was evaluated and examined at bedside.  Labs and imagings were reviewed. Case discussed with nursing, CM,  nurse, pharmacy in IDT rounds. Care plan was updated with the patient.      Consultants/Specialty  Neuro-ophthalmologist, Dr. Ramirez  #[phone].    Code Status  Full Code    Disposition  SNF    Review of Systems  Review of Systems   Constitutional: Positive for  malaise/fatigue. Negative for chills and fever.   HENT: Negative for ear discharge and ear pain.    Eyes: Positive for pain. Negative for blurred vision, double vision and discharge.   Respiratory: Negative for cough, hemoptysis, shortness of breath and wheezing.    Cardiovascular: Negative for chest pain and palpitations.   Gastrointestinal: Positive for nausea. Negative for abdominal pain, blood in stool, diarrhea and vomiting.   Genitourinary: Negative for dysuria, flank pain, hematuria and urgency.   Musculoskeletal: Negative for back pain and neck pain.   Neurological: Positive for headaches. Negative for tremors, sensory change and loss of consciousness.        Physical Exam  Temp:  [36.2 °C (97.1 °F)-36.7 °C (98.1 °F)] 36.2 °C (97.2 °F)  Pulse:  [69-84] 84  Resp:  [14-18] 18  BP: (107-126)/(62-93) 107/62  SpO2:  [90 %-95 %] 90 %    Physical Exam  Constitutional:       General: She is not in acute distress.     Appearance: Normal appearance. She is not ill-appearing.   HENT:      Head: Normocephalic and atraumatic.      Right Ear: External ear normal.      Left Ear: External ear normal.      Nose: Nose normal. No congestion or rhinorrhea.      Mouth/Throat:      Mouth: Mucous membranes are moist.   Eyes:      Extraocular Movements: Extraocular movements intact.      Pupils: Pupils are equal, round, and reactive to light.   Cardiovascular:      Rate and Rhythm: Regular rhythm.      Pulses: Normal pulses.      Heart sounds: Normal heart sounds.   Pulmonary:      Effort: Pulmonary effort is normal. No respiratory distress.      Breath sounds: Normal breath sounds. No stridor. No wheezing, rhonchi or rales.   Chest:      Chest wall: No tenderness.   Abdominal:      General: There is no distension.      Palpations: Abdomen is soft.      Tenderness: There is no abdominal tenderness. There is no guarding or rebound.   Musculoskeletal:         General: No swelling or tenderness. Normal range of motion.      Cervical  back: Normal range of motion and neck supple.   Skin:     General: Skin is warm and dry.   Neurological:      General: No focal deficit present.      Mental Status: She is alert and oriented to person, place, and time.      Sensory: No sensory deficit.      Motor: No weakness.      Coordination: Coordination normal.   Psychiatric:         Mood and Affect: Mood normal.         Fluids  No intake or output data in the 24 hours ending 06/07/21 1540    Laboratory  Recent Labs     06/05/21  0403 06/06/21  1140 06/07/21  0112   WBC 3.8* 3.5* 7.2   RBC 4.15* 4.31 4.42   HEMOGLOBIN 12.0 12.6 13.1   HEMATOCRIT 38.2 38.7 39.8   MCV 92.0 89.8 90.0   MCH 28.9 29.2 29.6   MCHC 31.4* 32.6* 32.9*   RDW 48.3 45.6 45.4   PLATELETCT 168 182 176   MPV 12.0 11.6 11.6     Recent Labs     06/05/21  0403 06/06/21  0221 06/07/21  0112   SODIUM 136 139 134*   POTASSIUM 4.0 5.0 4.0   CHLORIDE 107 109 105   CO2 18* 19* 18*   GLUCOSE 97 96 171*   BUN 10 8 10   CREATININE 0.80 0.66 0.63   CALCIUM 9.1 9.1 9.3                   Imaging  MR-THORACIC SPINE-WITH & W/O   Final Result      1.  Multilevel Schmorl's node endplate irregularities in the lower thoracic spine.   2.  Otherwise unremarkable MRI of the thoracic spine without contrast.   3.  No evidence of demyelinating lesion.      MR-CERVICAL SPINE-WITH & W/O   Final Result      1. MRI OF THE CERVICAL SPINE WITHOUT AND WITH CONTRAST WITHIN NORMAL LIMITS. NO EVIDENCE OF DEMYELINATING DISEASE IN THE CERVICAL SPINAL CORD.      MR-LUMBAR SPINE-WITH & W/O   Final Result      1.  Right-sided sacral neurostimulator device placed for management urinary and fecal incontinence per the medical records.   2.  L4-5 postoperative anterior interbody fusion with disc space  inserts and anterior plate and screw fixation.   3.  Schmorl's node endplate irregularities at T10 to T11 and T11-T12. Doubtful clinical significance.   4.  No significant disc bulge or protrusion, central stenosis, or foraminal stenosis  at any lumbar level.   5.  No evidence of demyelinating disease in the distal thoracic cord or conus medullaris is in the field-of-view.      MR-ORBITS,FACE,NECK-WITH&W/O & SEQUENCES   Final Result      1.  MRI OF THE ORBITS WITHOUT AND WITH CONTRAST WITHIN NORMAL LIMITS.   2.  NO IMAGING EVIDENCE OF OPTIC NEURITIS.      MR-BRAIN-WITH & W/O   Final Result   Addendum 1 of 1   ADDENDUM:      The HISTORY/REASON FOR EXAM should read: Worsening bilateral eye pain and    blurred vision.      Final      1.  MRI OF THE BRAIN WITHOUT AND WITH CONTRAST WITHIN NORMAL LIMITS. NO EVIDENCE OF DEMYELINATING DISEASE.   2.  NO SIGNIFICANT CHANGE FROM 11/23/2015.           Assessment/Plan  * Optic neuritis- (present on admission)  Assessment & Plan  MRI orbits with and without contrast, MRI brain with and without contrast ordered  Patient reported the blurring vision, which is new for her    6/6 I called Neuro-ophthalmologist Dr. Ramirez, who recommended a full work-up with MRI  and Solu-Medrol 250 mg daily for 3 days.     MRI reviewed, MRI brain, MRI orbital, MRI cervical/thoracic/lumbar, with and without contrast where reviewed, negative for demyelination, tumor, infection, disc protrusion, stenosis. Negative for optic neuritis.   Positive for Schmorl's node endplate irregularities, but unlikely the cause of patient's current eye pain and vision changes.    Dr. Hart will be back to town tomorrow 6/8, please contact him for further recommendations    Normal pressure hydrocephalus (HCC)- (present on admission)  Assessment & Plan  Worsening eye pain and multiple falls  Fall precautions in place  MRI brain with and without contrast ordered  Consulted with Dr. Newton in a.m.    Hypothyroidism- (present on admission)  Assessment & Plan  Continue home meds  TSH ordered to assess    Schizophrenia (HCC)- (present on admission)  Assessment & Plan  Continue home medications      Hyperglycemia- (present on admission)  Assessment &  Plan  A1c seem to be normal at 5.4, glucose 95 on admission  Patient describes history of hyperglycemia    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HCC)- (present on admission)  Assessment & Plan  History of low transverse myelitis  On sacral neurostimulator for urinary and fecal incontinence     PT OT evaluated recommended SNF       VTE prophylaxis: Lovenox

## 2021-06-08 ENCOUNTER — PATIENT OUTREACH (OUTPATIENT)
Dept: HEALTH INFORMATION MANAGEMENT | Facility: OTHER | Age: 31
End: 2021-06-08

## 2021-06-08 LAB
ALBUMIN SERPL BCP-MCNC: 3.7 G/DL (ref 3.2–4.9)
ALBUMIN/GLOB SERPL: 1.7 G/DL
ALP SERPL-CCNC: 103 U/L (ref 30–99)
ALT SERPL-CCNC: 184 U/L (ref 2–50)
ANION GAP SERPL CALC-SCNC: 12 MMOL/L (ref 7–16)
AST SERPL-CCNC: 25 U/L (ref 12–45)
BASOPHILS # BLD AUTO: 0.1 % (ref 0–1.8)
BASOPHILS # BLD: 0.01 K/UL (ref 0–0.12)
BILIRUB SERPL-MCNC: <0.2 MG/DL (ref 0.1–1.5)
BUN SERPL-MCNC: 10 MG/DL (ref 8–22)
CALCIUM SERPL-MCNC: 8.9 MG/DL (ref 8.5–10.5)
CHLORIDE SERPL-SCNC: 109 MMOL/L (ref 96–112)
CO2 SERPL-SCNC: 17 MMOL/L (ref 20–33)
CREAT SERPL-MCNC: 0.68 MG/DL (ref 0.5–1.4)
EOSINOPHIL # BLD AUTO: 0 K/UL (ref 0–0.51)
EOSINOPHIL NFR BLD: 0 % (ref 0–6.9)
ERYTHROCYTE [DISTWIDTH] IN BLOOD BY AUTOMATED COUNT: 46.5 FL (ref 35.9–50)
GLOBULIN SER CALC-MCNC: 2.2 G/DL (ref 1.9–3.5)
GLUCOSE SERPL-MCNC: 196 MG/DL (ref 65–99)
HCT VFR BLD AUTO: 40.6 % (ref 37–47)
HGB BLD-MCNC: 12.8 G/DL (ref 12–16)
IMM GRANULOCYTES # BLD AUTO: 0.07 K/UL (ref 0–0.11)
IMM GRANULOCYTES NFR BLD AUTO: 0.8 % (ref 0–0.9)
LYMPHOCYTES # BLD AUTO: 0.65 K/UL (ref 1–4.8)
LYMPHOCYTES NFR BLD: 7.7 % (ref 22–41)
MCH RBC QN AUTO: 28.9 PG (ref 27–33)
MCHC RBC AUTO-ENTMCNC: 31.5 G/DL (ref 33.6–35)
MCV RBC AUTO: 91.6 FL (ref 81.4–97.8)
MONOCYTES # BLD AUTO: 0.08 K/UL (ref 0–0.85)
MONOCYTES NFR BLD AUTO: 0.9 % (ref 0–13.4)
NEUTROPHILS # BLD AUTO: 7.63 K/UL (ref 2–7.15)
NEUTROPHILS NFR BLD: 90.5 % (ref 44–72)
NRBC # BLD AUTO: 0 K/UL
NRBC BLD-RTO: 0 /100 WBC
PLATELET # BLD AUTO: 188 K/UL (ref 164–446)
PMV BLD AUTO: 11.8 FL (ref 9–12.9)
POTASSIUM SERPL-SCNC: 4.2 MMOL/L (ref 3.6–5.5)
PROT SERPL-MCNC: 5.9 G/DL (ref 6–8.2)
RBC # BLD AUTO: 4.43 M/UL (ref 4.2–5.4)
SODIUM SERPL-SCNC: 138 MMOL/L (ref 135–145)
WBC # BLD AUTO: 8.4 K/UL (ref 4.8–10.8)

## 2021-06-08 PROCEDURE — 700102 HCHG RX REV CODE 250 W/ 637 OVERRIDE(OP): Performed by: STUDENT IN AN ORGANIZED HEALTH CARE EDUCATION/TRAINING PROGRAM

## 2021-06-08 PROCEDURE — 700102 HCHG RX REV CODE 250 W/ 637 OVERRIDE(OP): Performed by: INTERNAL MEDICINE

## 2021-06-08 PROCEDURE — 36415 COLL VENOUS BLD VENIPUNCTURE: CPT

## 2021-06-08 PROCEDURE — A9270 NON-COVERED ITEM OR SERVICE: HCPCS | Performed by: STUDENT IN AN ORGANIZED HEALTH CARE EDUCATION/TRAINING PROGRAM

## 2021-06-08 PROCEDURE — 80053 COMPREHEN METABOLIC PANEL: CPT

## 2021-06-08 PROCEDURE — 700105 HCHG RX REV CODE 258: Performed by: STUDENT IN AN ORGANIZED HEALTH CARE EDUCATION/TRAINING PROGRAM

## 2021-06-08 PROCEDURE — 700111 HCHG RX REV CODE 636 W/ 250 OVERRIDE (IP): Performed by: STUDENT IN AN ORGANIZED HEALTH CARE EDUCATION/TRAINING PROGRAM

## 2021-06-08 PROCEDURE — 99232 SBSQ HOSP IP/OBS MODERATE 35: CPT | Performed by: INTERNAL MEDICINE

## 2021-06-08 PROCEDURE — A9270 NON-COVERED ITEM OR SERVICE: HCPCS | Performed by: INTERNAL MEDICINE

## 2021-06-08 PROCEDURE — 85025 COMPLETE CBC W/AUTO DIFF WBC: CPT

## 2021-06-08 PROCEDURE — 97535 SELF CARE MNGMENT TRAINING: CPT | Mod: CO

## 2021-06-08 PROCEDURE — 770006 HCHG ROOM/CARE - MED/SURG/GYN SEMI*

## 2021-06-08 RX ORDER — OXYCODONE HYDROCHLORIDE 5 MG/1
5 TABLET ORAL EVERY 4 HOURS PRN
Status: DISCONTINUED | OUTPATIENT
Start: 2021-06-08 | End: 2021-06-10 | Stop reason: HOSPADM

## 2021-06-08 RX ADMIN — LEVOTHYROXINE SODIUM 100 MCG: 0.1 TABLET ORAL at 04:07

## 2021-06-08 RX ADMIN — OXYCODONE 5 MG: 5 TABLET ORAL at 21:08

## 2021-06-08 RX ADMIN — TOPIRAMATE 50 MG: 25 TABLET, FILM COATED ORAL at 21:06

## 2021-06-08 RX ADMIN — OXYCODONE 5 MG: 5 TABLET ORAL at 17:08

## 2021-06-08 RX ADMIN — BACLOFEN 10 MG: 10 TABLET ORAL at 04:07

## 2021-06-08 RX ADMIN — FLUOXETINE 40 MG: 20 CAPSULE ORAL at 04:07

## 2021-06-08 RX ADMIN — BUSPIRONE HYDROCHLORIDE 30 MG: 10 TABLET ORAL at 08:35

## 2021-06-08 RX ADMIN — ZIPRASIDONE HYDROCHLORIDE 40 MG: 40 CAPSULE ORAL at 04:07

## 2021-06-08 RX ADMIN — TOPIRAMATE 50 MG: 25 TABLET, FILM COATED ORAL at 08:35

## 2021-06-08 RX ADMIN — MYCOPHENOLATE MOFETIL 500 MG: 250 CAPSULE ORAL at 21:06

## 2021-06-08 RX ADMIN — IVABRADINE 7.5 MG: 7.5 TABLET, FILM COATED ORAL at 17:08

## 2021-06-08 RX ADMIN — FERROUS SULFATE TAB 325 MG (65 MG ELEMENTAL FE) 325 MG: 325 (65 FE) TAB at 08:35

## 2021-06-08 RX ADMIN — MORPHINE SULFATE 2 MG: 4 INJECTION INTRAVENOUS at 03:47

## 2021-06-08 RX ADMIN — PREGABALIN 300 MG: 150 CAPSULE ORAL at 04:07

## 2021-06-08 RX ADMIN — MORPHINE SULFATE 2 MG: 4 INJECTION INTRAVENOUS at 07:46

## 2021-06-08 RX ADMIN — Medication 10 MG: at 21:06

## 2021-06-08 RX ADMIN — ASPIRIN 81 MG: 81 TABLET, COATED ORAL at 04:07

## 2021-06-08 RX ADMIN — OXCARBAZEPINE 300 MG: 300 TABLET, FILM COATED ORAL at 08:34

## 2021-06-08 RX ADMIN — BUSPIRONE HYDROCHLORIDE 30 MG: 10 TABLET ORAL at 21:06

## 2021-06-08 RX ADMIN — ZIPRASIDONE HYDROCHLORIDE 40 MG: 40 CAPSULE ORAL at 17:08

## 2021-06-08 RX ADMIN — IVABRADINE 7.5 MG: 7.5 TABLET, FILM COATED ORAL at 08:35

## 2021-06-08 RX ADMIN — BACLOFEN 10 MG: 10 TABLET ORAL at 13:01

## 2021-06-08 RX ADMIN — ENOXAPARIN SODIUM 40 MG: 40 INJECTION SUBCUTANEOUS at 04:08

## 2021-06-08 RX ADMIN — BACLOFEN 10 MG: 10 TABLET ORAL at 17:08

## 2021-06-08 RX ADMIN — METHOCARBAMOL 750 MG: 750 TABLET ORAL at 21:06

## 2021-06-08 RX ADMIN — OXCARBAZEPINE 300 MG: 300 TABLET, FILM COATED ORAL at 21:06

## 2021-06-08 RX ADMIN — TRAZODONE HYDROCHLORIDE 100 MG: 100 TABLET ORAL at 21:06

## 2021-06-08 RX ADMIN — PREGABALIN 300 MG: 150 CAPSULE ORAL at 17:08

## 2021-06-08 RX ADMIN — MYCOPHENOLATE MOFETIL 500 MG: 250 CAPSULE ORAL at 08:35

## 2021-06-08 RX ADMIN — SODIUM CHLORIDE 250 MG: 9 INJECTION, SOLUTION INTRAVENOUS at 18:25

## 2021-06-08 RX ADMIN — OXYCODONE 5 MG: 5 TABLET ORAL at 13:01

## 2021-06-08 ASSESSMENT — ENCOUNTER SYMPTOMS
BACK PAIN: 0
WHEEZING: 0
DOUBLE VISION: 0
BLURRED VISION: 0
BLOOD IN STOOL: 0
NECK PAIN: 0
SHORTNESS OF BREATH: 0
EYE PAIN: 1
PALPITATIONS: 0
ABDOMINAL PAIN: 0
HEMOPTYSIS: 0
CHILLS: 0
NAUSEA: 1
SENSORY CHANGE: 0
DIARRHEA: 0
FEVER: 0
HEADACHES: 1
COUGH: 0

## 2021-06-08 ASSESSMENT — COGNITIVE AND FUNCTIONAL STATUS - GENERAL
DRESSING REGULAR LOWER BODY CLOTHING: A LOT
DAILY ACTIVITIY SCORE: 19
SUGGESTED CMS G CODE MODIFIER DAILY ACTIVITY: CK
HELP NEEDED FOR BATHING: A LOT
TOILETING: A LITTLE

## 2021-06-08 ASSESSMENT — PAIN DESCRIPTION - PAIN TYPE
TYPE: ACUTE PAIN
TYPE: ACUTE PAIN;CHRONIC PAIN
TYPE: ACUTE PAIN

## 2021-06-08 NOTE — CARE PLAN
The patient is Stable - Low risk of patient condition declining or worsening    Shift Goals  Clinical Goals: Maintain safety, pain control  Patient Goals: Sleep, bed bath    Progress made toward(s) clinical / shift goals:  Fall precautions in place, use pharm and non pharm interventions for pain control.     Patient is not progressing towards the following goals:

## 2021-06-08 NOTE — THERAPY
"Occupational Therapy  Daily Treatment     Patient Name: Kristin Balderrama  Age:  30 y.o., Sex:  female  Medical Record #: [id number]  Today's Date: 6/8/2021       Precautions: Fall Risk  Comments: weak    Assessment    Pt seen for OT tx. Continues to be limited by weakness impacting ability to complete ADLs and ADL transfers. Pt reporting increased \"shakiness\" in standing from baseline. Pt's baseline is txfs to WC/toilet/BSC. She stated she used to use SB but is now a squat pivot txf. Required max A for LB dressing w/ B CAM boots, which her grandma assists with at baseline. Pt appears close to baseline, will continue to follow while in house to progress towards goals.     Plan    Treatment plan modified to 1 time per week until therapy goals are met for the following treatments:  Adaptive Equipment, Self Care/Activities of Daily Living, Therapeutic Activities and Therapeutic Exercises.    DC Equipment Recommendations: None  Discharge Recommendations: Recommend home health for continued occupational therapy services       06/08/21 0901   Cognition    Comments pleasant and cooperative    Balance   Sitting Balance (Static) Fair   Sitting Balance (Dynamic) Fair   Standing Balance (Static) Fair -   Standing Balance (Dynamic) Poor +   Weight Shift Sitting Fair   Weight Shift Standing Poor   Bed Mobility    Supine to Sit Supervised   Sit to Supine   (left up in chair )   Scooting Supervised   Activities of Daily Living   Grooming Supervision;Seated   Upper Body Dressing Supervision   Lower Body Dressing Maximal Assist   Toileting Minimal Assist   Functional Mobility   Sit to Stand Minimal Assist   Bed, Chair, Wheelchair Transfer Minimal Assist   Toilet Transfers Minimal Assist   Short Term Goals   Short Term Goal # 1 supervisd level for necesary functional transfers   Goal Outcome # 1 Progressing as expected   Short Term Goal # 2 set up for seated dressing tasks   Goal Outcome # 2 Progressing as expected   Short Term Goal " # 3 distant supervision for toileting tasks   Goal Outcome # 3 Progressing as expected   Anticipated Discharge Equipment and Recommendations   DC Equipment Recommendations None   Discharge Recommendations Recommend home health for continued occupational therapy services

## 2021-06-08 NOTE — PROGRESS NOTES
Hospital Medicine Daily Progress Note    Date of Service  6/8/2021    Chief Complaint  30 y.o. female admitted 6/4/2021 with eye pain    Hospital Course  30 y.o. female past medical history of schizophrenia, TONYA, optic neuritis, normal pressure hydrocephalus, transverse myelitis, scoliosis, who presented 6/4/2021 for evaluation of worsening bilateral eye pain.   She sees Dr Yousif, neuro-ophthalmology who advised her to come in.  The pt has no changes to her vision. No cp, no sob, no vomiting.  She has no fall or trauma this week however, describes multiple falls in the past few months    Optic neuritis, multiple falls since January     Dr. Zee, Neuropthalmologist is consulted in the emergency department.       Interval Problem Update  Patient still reported blurring vision and eye pain, no changes. Patient has said her vision has been very blurry, and only can see fingers when put 1 inch in front of her eyes. And eye pain with movement    MRI reviewed, MRI brain, MRI orbital, MRI cervical/thoracic/lumbar, with and without contrast where reviewed, negative for demyelination, tumor, infection, disc protrusion, stenosis. Negative for optic neuritis.   Positive for Schmorl's node endplate irregularities, unlikely the cause of patient's current eye pain and vision changes.      6/6 I called Neuro-ophthalmologist Dr. Ramirez, who recommended a full work-up with MRI  and Solu-Medrol 250 mg daily for 3 days.     Dr. Hart will be back to town tomorrow 6/8, please contact him for further recommendations    TSH 3.1   A1c 5.4    Patient was evaluated and examined at bedside.  Labs and imagings were reviewed. Case discussed with nursing, CM,  nurse, pharmacy in IDT rounds. Care plan was updated with the patient.    6/8: Patient has been complaining about pain and asking for IV morphine every 3 hours.  Await neuro ophthalmologist recommendation.  Once he cleared the patient will transfer her to either home  health or SNF.    Consultants/Specialty  Neuro-ophthalmologist, Dr. Ramirez  #[phone].    Code Status  Full Code    Disposition  SNF    Review of Systems  Review of Systems   Constitutional: Positive for malaise/fatigue. Negative for chills and fever.   HENT: Negative for ear discharge and ear pain.    Eyes: Positive for pain. Negative for blurred vision and double vision.   Respiratory: Negative for cough, hemoptysis, shortness of breath and wheezing.    Cardiovascular: Negative for chest pain and palpitations.   Gastrointestinal: Positive for nausea. Negative for abdominal pain, blood in stool and diarrhea.   Genitourinary: Negative for dysuria, hematuria and urgency.   Musculoskeletal: Negative for back pain and neck pain.   Neurological: Positive for headaches. Negative for sensory change.        Physical Exam  Temp:  [36.1 °C (97 °F)-37.1 °C (98.7 °F)] 37.1 °C (98.7 °F)  Pulse:  [67-69] 69  Resp:  [16-18] 18  BP: (109-130)/(59-70) 120/59  SpO2:  [90 %-94 %] 90 %    Physical Exam  Constitutional:       General: She is not in acute distress.     Appearance: Normal appearance. She is not ill-appearing.   HENT:      Head: Normocephalic and atraumatic.      Right Ear: External ear normal.      Left Ear: External ear normal.      Nose: Nose normal. No congestion or rhinorrhea.      Mouth/Throat:      Mouth: Mucous membranes are moist.   Eyes:      Extraocular Movements: Extraocular movements intact.      Pupils: Pupils are equal, round, and reactive to light.   Cardiovascular:      Rate and Rhythm: Regular rhythm.      Pulses: Normal pulses.      Heart sounds: Normal heart sounds.   Pulmonary:      Effort: Pulmonary effort is normal. No respiratory distress.      Breath sounds: Normal breath sounds. No stridor. No wheezing or rhonchi.   Abdominal:      General: There is no distension.      Palpations: Abdomen is soft.      Tenderness: There is no abdominal tenderness.   Musculoskeletal:         General: No  swelling or tenderness. Normal range of motion.      Cervical back: Normal range of motion and neck supple.   Skin:     General: Skin is warm and dry.   Neurological:      General: No focal deficit present.      Mental Status: She is alert and oriented to person, place, and time.   Psychiatric:         Mood and Affect: Mood normal.         Fluids    Intake/Output Summary (Last 24 hours) at 6/8/2021 0815  Last data filed at 6/8/2021 0400  Gross per 24 hour   Intake --   Output 1105 ml   Net -1105 ml       Laboratory  Recent Labs     06/06/21  1140 06/07/21  0112 06/08/21  0220   WBC 3.5* 7.2 8.4   RBC 4.31 4.42 4.43   HEMOGLOBIN 12.6 13.1 12.8   HEMATOCRIT 38.7 39.8 40.6   MCV 89.8 90.0 91.6   MCH 29.2 29.6 28.9   MCHC 32.6* 32.9* 31.5*   RDW 45.6 45.4 46.5   PLATELETCT 182 176 188   MPV 11.6 11.6 11.8     Recent Labs     06/06/21  0221 06/07/21  0112 06/08/21  0220   SODIUM 139 134* 138   POTASSIUM 5.0 4.0 4.2   CHLORIDE 109 105 109   CO2 19* 18* 17*   GLUCOSE 96 171* 196*   BUN 8 10 10   CREATININE 0.66 0.63 0.68   CALCIUM 9.1 9.3 8.9                   Imaging  MR-THORACIC SPINE-WITH & W/O   Final Result      1.  Multilevel Schmorl's node endplate irregularities in the lower thoracic spine.   2.  Otherwise unremarkable MRI of the thoracic spine without contrast.   3.  No evidence of demyelinating lesion.      MR-CERVICAL SPINE-WITH & W/O   Final Result      1. MRI OF THE CERVICAL SPINE WITHOUT AND WITH CONTRAST WITHIN NORMAL LIMITS. NO EVIDENCE OF DEMYELINATING DISEASE IN THE CERVICAL SPINAL CORD.      MR-LUMBAR SPINE-WITH & W/O   Final Result      1.  Right-sided sacral neurostimulator device placed for management urinary and fecal incontinence per the medical records.   2.  L4-5 postoperative anterior interbody fusion with disc space  inserts and anterior plate and screw fixation.   3.  Schmorl's node endplate irregularities at T10 to T11 and T11-T12. Doubtful clinical significance.   4.  No significant disc  bulge or protrusion, central stenosis, or foraminal stenosis at any lumbar level.   5.  No evidence of demyelinating disease in the distal thoracic cord or conus medullaris is in the field-of-view.      MR-ORBITS,FACE,NECK-WITH&W/O & SEQUENCES   Final Result      1.  MRI OF THE ORBITS WITHOUT AND WITH CONTRAST WITHIN NORMAL LIMITS.   2.  NO IMAGING EVIDENCE OF OPTIC NEURITIS.      MR-BRAIN-WITH & W/O   Final Result   Addendum 1 of 1   ADDENDUM:      The HISTORY/REASON FOR EXAM should read: Worsening bilateral eye pain and    blurred vision.      Final      1.  MRI OF THE BRAIN WITHOUT AND WITH CONTRAST WITHIN NORMAL LIMITS. NO EVIDENCE OF DEMYELINATING DISEASE.   2.  NO SIGNIFICANT CHANGE FROM 11/23/2015.           Assessment/Plan  * Optic neuritis- (present on admission)  Assessment & Plan  MRI orbits with and without contrast, MRI brain with and without contrast ordered  Patient reported the blurring vision, which is new for her    6/6 I called Neuro-ophthalmologist Dr. Ramirez, who recommended a full work-up with MRI  and Solu-Medrol 250 mg daily for 3 days.     MRI reviewed, MRI brain, MRI orbital, MRI cervical/thoracic/lumbar, with and without contrast where reviewed, negative for demyelination, tumor, infection, disc protrusion, stenosis. Negative for optic neuritis.   Positive for Schmorl's node endplate irregularities, but unlikely the cause of patient's current eye pain and vision changes.    Dr. Hart to follow    Normal pressure hydrocephalus (HCC)- (present on admission)  Assessment & Plan  Worsening eye pain and multiple falls  Fall precautions in place  MRI brain with and without contrast ordered  Consulted with Dr. Newton in a.m.    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HCC)- (present on admission)  Assessment & Plan  History of low transverse myelitis  On sacral neurostimulator for urinary and fecal incontinence     PT OT evaluated recommended SNF    Hypothyroidism- (present on admission)  Assessment &  Plan  Continue home meds  TSH ordered to assess    Schizophrenia (HCC)- (present on admission)  Assessment & Plan  Continue home medications      Hyperglycemia- (present on admission)  Assessment & Plan  A1c seem to be normal at 5.4, glucose 95 on admission  Patient describes history of hyperglycemia       VTE prophylaxis: Lovenox

## 2021-06-09 LAB
ALBUMIN SERPL BCP-MCNC: 3.8 G/DL (ref 3.2–4.9)
ALBUMIN/GLOB SERPL: 2.1 G/DL
ALP SERPL-CCNC: 92 U/L (ref 30–99)
ALT SERPL-CCNC: 121 U/L (ref 2–50)
ANION GAP SERPL CALC-SCNC: 13 MMOL/L (ref 7–16)
AST SERPL-CCNC: 16 U/L (ref 12–45)
BASOPHILS # BLD AUTO: 0 % (ref 0–1.8)
BASOPHILS # BLD: 0 K/UL (ref 0–0.12)
BILIRUB SERPL-MCNC: 0.2 MG/DL (ref 0.1–1.5)
BUN SERPL-MCNC: 13 MG/DL (ref 8–22)
CALCIUM SERPL-MCNC: 8.7 MG/DL (ref 8.5–10.5)
CHLORIDE SERPL-SCNC: 107 MMOL/L (ref 96–112)
CO2 SERPL-SCNC: 17 MMOL/L (ref 20–33)
CREAT SERPL-MCNC: 0.65 MG/DL (ref 0.5–1.4)
EOSINOPHIL # BLD AUTO: 0 K/UL (ref 0–0.51)
EOSINOPHIL NFR BLD: 0 % (ref 0–6.9)
ERYTHROCYTE [DISTWIDTH] IN BLOOD BY AUTOMATED COUNT: 45.8 FL (ref 35.9–50)
GLOBULIN SER CALC-MCNC: 1.8 G/DL (ref 1.9–3.5)
GLUCOSE SERPL-MCNC: 140 MG/DL (ref 65–99)
HCT VFR BLD AUTO: 40 % (ref 37–47)
HGB BLD-MCNC: 12.6 G/DL (ref 12–16)
IMM GRANULOCYTES # BLD AUTO: 0.06 K/UL (ref 0–0.11)
IMM GRANULOCYTES NFR BLD AUTO: 0.7 % (ref 0–0.9)
LYMPHOCYTES # BLD AUTO: 0.69 K/UL (ref 1–4.8)
LYMPHOCYTES NFR BLD: 8.6 % (ref 22–41)
MCH RBC QN AUTO: 28.8 PG (ref 27–33)
MCHC RBC AUTO-ENTMCNC: 31.5 G/DL (ref 33.6–35)
MCV RBC AUTO: 91.3 FL (ref 81.4–97.8)
MONOCYTES # BLD AUTO: 0.19 K/UL (ref 0–0.85)
MONOCYTES NFR BLD AUTO: 2.4 % (ref 0–13.4)
NEUTROPHILS # BLD AUTO: 7.08 K/UL (ref 2–7.15)
NEUTROPHILS NFR BLD: 88.3 % (ref 44–72)
NRBC # BLD AUTO: 0 K/UL
NRBC BLD-RTO: 0 /100 WBC
NUCLEAR IGG SER QL IA: NORMAL
PLATELET # BLD AUTO: 158 K/UL (ref 164–446)
PMV BLD AUTO: 12.1 FL (ref 9–12.9)
POTASSIUM SERPL-SCNC: 4.2 MMOL/L (ref 3.6–5.5)
PROT SERPL-MCNC: 5.6 G/DL (ref 6–8.2)
RBC # BLD AUTO: 4.38 M/UL (ref 4.2–5.4)
SODIUM SERPL-SCNC: 137 MMOL/L (ref 135–145)
WBC # BLD AUTO: 8 K/UL (ref 4.8–10.8)

## 2021-06-09 PROCEDURE — 700102 HCHG RX REV CODE 250 W/ 637 OVERRIDE(OP): Performed by: STUDENT IN AN ORGANIZED HEALTH CARE EDUCATION/TRAINING PROGRAM

## 2021-06-09 PROCEDURE — 97530 THERAPEUTIC ACTIVITIES: CPT | Mod: CQ

## 2021-06-09 PROCEDURE — A9270 NON-COVERED ITEM OR SERVICE: HCPCS | Performed by: INTERNAL MEDICINE

## 2021-06-09 PROCEDURE — 770006 HCHG ROOM/CARE - MED/SURG/GYN SEMI*

## 2021-06-09 PROCEDURE — 700111 HCHG RX REV CODE 636 W/ 250 OVERRIDE (IP): Performed by: STUDENT IN AN ORGANIZED HEALTH CARE EDUCATION/TRAINING PROGRAM

## 2021-06-09 PROCEDURE — 700102 HCHG RX REV CODE 250 W/ 637 OVERRIDE(OP): Performed by: INTERNAL MEDICINE

## 2021-06-09 PROCEDURE — 36415 COLL VENOUS BLD VENIPUNCTURE: CPT

## 2021-06-09 PROCEDURE — A9270 NON-COVERED ITEM OR SERVICE: HCPCS | Performed by: STUDENT IN AN ORGANIZED HEALTH CARE EDUCATION/TRAINING PROGRAM

## 2021-06-09 PROCEDURE — 97535 SELF CARE MNGMENT TRAINING: CPT | Mod: CQ

## 2021-06-09 PROCEDURE — 80053 COMPREHEN METABOLIC PANEL: CPT

## 2021-06-09 PROCEDURE — 85025 COMPLETE CBC W/AUTO DIFF WBC: CPT

## 2021-06-09 RX ORDER — METHYLPREDNISOLONE 4 MG/1
TABLET ORAL
Qty: 21 TABLET | Refills: 0 | Status: ON HOLD
Start: 2021-06-09 | End: 2021-06-28

## 2021-06-09 RX ADMIN — FERROUS SULFATE TAB 325 MG (65 MG ELEMENTAL FE) 325 MG: 325 (65 FE) TAB at 08:06

## 2021-06-09 RX ADMIN — TOPIRAMATE 50 MG: 25 TABLET, FILM COATED ORAL at 08:06

## 2021-06-09 RX ADMIN — FLUOXETINE 40 MG: 20 CAPSULE ORAL at 04:54

## 2021-06-09 RX ADMIN — IVABRADINE 7.5 MG: 7.5 TABLET, FILM COATED ORAL at 16:24

## 2021-06-09 RX ADMIN — ZIPRASIDONE HYDROCHLORIDE 40 MG: 40 CAPSULE ORAL at 04:53

## 2021-06-09 RX ADMIN — BACLOFEN 10 MG: 10 TABLET ORAL at 04:53

## 2021-06-09 RX ADMIN — OXCARBAZEPINE 300 MG: 300 TABLET, FILM COATED ORAL at 20:01

## 2021-06-09 RX ADMIN — Medication 10 MG: at 20:00

## 2021-06-09 RX ADMIN — DOCUSATE SODIUM 50 MG AND SENNOSIDES 8.6 MG 2 TABLET: 8.6; 5 TABLET, FILM COATED ORAL at 04:53

## 2021-06-09 RX ADMIN — MYCOPHENOLATE MOFETIL 500 MG: 250 CAPSULE ORAL at 08:05

## 2021-06-09 RX ADMIN — BACLOFEN 10 MG: 10 TABLET ORAL at 12:16

## 2021-06-09 RX ADMIN — IVABRADINE 7.5 MG: 7.5 TABLET, FILM COATED ORAL at 08:06

## 2021-06-09 RX ADMIN — ASPIRIN 81 MG: 81 TABLET, COATED ORAL at 04:53

## 2021-06-09 RX ADMIN — OXCARBAZEPINE 300 MG: 300 TABLET, FILM COATED ORAL at 08:06

## 2021-06-09 RX ADMIN — TRAZODONE HYDROCHLORIDE 100 MG: 100 TABLET ORAL at 20:01

## 2021-06-09 RX ADMIN — ENOXAPARIN SODIUM 40 MG: 40 INJECTION SUBCUTANEOUS at 04:52

## 2021-06-09 RX ADMIN — PREGABALIN 300 MG: 150 CAPSULE ORAL at 16:24

## 2021-06-09 RX ADMIN — OXYCODONE 5 MG: 5 TABLET ORAL at 16:24

## 2021-06-09 RX ADMIN — LEVOTHYROXINE SODIUM 100 MCG: 0.1 TABLET ORAL at 04:54

## 2021-06-09 RX ADMIN — TOPIRAMATE 50 MG: 25 TABLET, FILM COATED ORAL at 20:00

## 2021-06-09 RX ADMIN — BUSPIRONE HYDROCHLORIDE 30 MG: 10 TABLET ORAL at 08:05

## 2021-06-09 RX ADMIN — BUSPIRONE HYDROCHLORIDE 30 MG: 10 TABLET ORAL at 20:00

## 2021-06-09 RX ADMIN — ONDANSETRON 4 MG: 2 INJECTION INTRAMUSCULAR; INTRAVENOUS at 06:09

## 2021-06-09 RX ADMIN — OXYCODONE 5 MG: 5 TABLET ORAL at 12:16

## 2021-06-09 RX ADMIN — OXYCODONE 5 MG: 5 TABLET ORAL at 23:42

## 2021-06-09 RX ADMIN — BACLOFEN 10 MG: 10 TABLET ORAL at 16:24

## 2021-06-09 RX ADMIN — METHOCARBAMOL 750 MG: 750 TABLET ORAL at 20:00

## 2021-06-09 RX ADMIN — OXYCODONE 5 MG: 5 TABLET ORAL at 02:00

## 2021-06-09 RX ADMIN — PREGABALIN 300 MG: 150 CAPSULE ORAL at 04:53

## 2021-06-09 RX ADMIN — OXYCODONE 5 MG: 5 TABLET ORAL at 06:09

## 2021-06-09 RX ADMIN — ONDANSETRON 4 MG: 2 INJECTION INTRAMUSCULAR; INTRAVENOUS at 13:53

## 2021-06-09 RX ADMIN — MYCOPHENOLATE MOFETIL 500 MG: 250 CAPSULE ORAL at 20:01

## 2021-06-09 RX ADMIN — ZIPRASIDONE HYDROCHLORIDE 40 MG: 40 CAPSULE ORAL at 16:23

## 2021-06-09 RX ADMIN — DOCUSATE SODIUM 50 MG AND SENNOSIDES 8.6 MG 2 TABLET: 8.6; 5 TABLET, FILM COATED ORAL at 16:23

## 2021-06-09 RX ADMIN — ONDANSETRON 4 MG: 2 INJECTION INTRAMUSCULAR; INTRAVENOUS at 20:01

## 2021-06-09 ASSESSMENT — PAIN DESCRIPTION - PAIN TYPE
TYPE: CHRONIC PAIN
TYPE: ACUTE PAIN;CHRONIC PAIN
TYPE: CHRONIC PAIN
TYPE: CHRONIC PAIN

## 2021-06-09 ASSESSMENT — COGNITIVE AND FUNCTIONAL STATUS - GENERAL
STANDING UP FROM CHAIR USING ARMS: A LITTLE
MOVING FROM LYING ON BACK TO SITTING ON SIDE OF FLAT BED: A LITTLE
SUGGESTED CMS G CODE MODIFIER MOBILITY: CK
TURNING FROM BACK TO SIDE WHILE IN FLAT BAD: A LITTLE
WALKING IN HOSPITAL ROOM: A LOT
MOVING TO AND FROM BED TO CHAIR: A LITTLE
CLIMB 3 TO 5 STEPS WITH RAILING: A LOT
MOBILITY SCORE: 16

## 2021-06-09 NOTE — CARE PLAN
The patient is Stable - Low risk of patient condition declining or worsening    Shift Goals  Clinical Goals: Pain control  Patient Goals: sleep    Progress made toward(s) clinical / shift goals:  Assessing pain levels per protocol, medicating per MAR. VSS, hourly rounding in place. Patient able to communicate all needs effectively.     Patient is not progressing towards the following goals:      Problem: Pain - Standard  Goal: Alleviation of pain or a reduction in pain to the patient’s comfort goal  Outcome: Not Progressing  Note: Patient continues to have high pain levels due to chronic back and leg pain. Plan to advance goal:  Continuing to medicate patient per MAR, utilizing non-pharmacological techniques such as therapeutic communication, rest, relaxation techniques, repositioning and warm blankets.

## 2021-06-09 NOTE — DISCHARGE PLANNING
Received Choice form at 0805  Agency/Facility Name: [facility]  Referral sent per Choice form @ [id number]

## 2021-06-09 NOTE — PROGRESS NOTES
Complaints of muscle stiffness and pain. Sponge bath given and repositioned with good relief. Resting and calm now.

## 2021-06-09 NOTE — THERAPY
Physical Therapy   Daily Treatment     Patient Name: Kristin Balderrama  Age:  30 y.o., Sex:  female  Medical Record #: [id number]  Today's Date: 6/9/2021     Precautions: (P) Fall Risk, Weight Bearing As Tolerated Right Lower Extremity, Weight Bearing As Tolerated Left Lower Extremity    Assessment    Pt willing to participate w/PT. Pt seen bed mobility and transfers. Pt needing no assist w/bed mobility wo/her boots, assist w/the boots. Instructed pt to have grandma put the boots on in bed to avoid kneeling on the floor. Pt's transfers w/FWW CGA,  min assist wo/AD. Pt instructed to use her FWW @ home to get in/out of the w/c,on/off the BSC. HHS to work on gait training in pts home setting. Pt does not need to be ambulatory to return home.    Pt is cleared for d/c home from PT standpoint w/HHS.     Plan    Continue current treatment plan.    DC Equipment Recommendations: No DME needed     Objective       06/09/21 1502   Other Treatments   Other Treatments Provided Instructed pt to use her FWW for her transfers for UE support vs holding onto grandma who does not have the strength. Pt transfers w/FWW, CGA.    Balance   Sitting Balance (Static) Good   Sitting Balance (Dynamic) Good   Standing Balance (Static) Fair -   Standing Balance (Dynamic) Poor +   Weight Shift Sitting Fair   Weight Shift Standing Fair   Comments w/FWW   Gait Analysis   Comments Did not assess gait, pt does not need to be ambulatory to d/c home. [facility] can assist pt w/amb in her household setting.    Bed Mobility    Supine to Sit Supervised   Sit to Supine Supervised   Scooting Supervised   Rolling Supervised   Comments Pt needing assist w/LE's on/off the bed if the boots are on, no assist if she dons the boots while seated. Instructed pt to have grandma juanita her boots while in bed and assist her LE's off the bed vs kneeling on the floor to get boots on.    Functional Mobility   Sit to Stand Supervised  (from EOB and recliner chair height->FWW)   Bed,  Chair, Wheelchair Transfer   (CGA w/FWW recliner<->EOB)   Transfer Method Stand Step   Skilled Intervention Verbal Cuing   Comments Initial transfer was wo/FWW, pt requiring more min assist. Provided FWW for her transfers and her assist level CGA. Pt instructed that the FWW will allow use of UE's to unwt LE's in her boots for taking steps.    How much difficulty does the patient currently have...   Turning over in bed (including adjusting bedclothes, sheets and blankets)? 3   Sitting down on and standing up from a chair with arms (e.g., wheelchair, bedside commode, etc.) 3   Moving from lying on back to sitting on the side of the bed? 3   How much help from another person does the patient currently need...   Moving to and from a bed to a chair (including a wheelchair)? 3   Need to walk in a hospital room? 2   Climbing 3-5 steps with a railing? 2   6 clicks Mobility Score 16   Short Term Goals    Short Term Goal # 1 Pt will perform bed mobility with supv within 6 visits in order to return home   Goal Outcome # 1 Goal met   Short Term Goal # 2 Pt will transfers bed<->chair with LRAD and supv within 6 visits in order to return home   Goal Outcome # 2 Goal not met   Short Term Goal # 3 Pt will ambulate 25' with LRAD and supv within 6 visits in order to return home   Goal Outcome # 3 Goal not met

## 2021-06-09 NOTE — CARE PLAN
The patient is Stable - Low risk of patient condition declining or worsening    Shift Goals  Clinical Goals: Pain control  Patient Goals: Sleep, bed bath    Progress made toward(s) clinical / shift goals:  Pt. Pain management transitioned to oxycodone and tolerating well. Pt up to chair for meals.

## 2021-06-09 NOTE — FACE TO FACE
Face to Face Supporting Documentation - Home Health    The encounter with this patient was in whole or in part the primary reason for home health admission.    Date of encounter:   Patient:                    MRN:                       YOB: 2022  Kristin Balderrama  [id number]  1990     Home health to see patient for:  Comment: weakness    Skilled need for:  Comment: PT/OT    Skilled nursing interventions to include:  Comment: PT/OT    Homebound status evidenced by:  Need the aid of supportive devices such as crutches, canes, wheelchairs or walkers. Leaving home requires a considerable and taxing effort. There is a normal inability to leave the home.    Community Physician to provide follow up care: Torres Brody M.D.     Optional Interventions? No      I certify the face to face encounter for this home health care referral meets the CMS requirements and the encounter/clinical assessment with the patient was, in whole, or in part, for the medical condition(s) listed above, which is the primary reason for home health care. Based on my clinical findings: the service(s) are medically necessary, support the need for home health care, and the homebound criteria are met.  I certify that this patient has had a face to face encounter by myself.  Herbert Casas M.D. - NPI: [id number]

## 2021-06-09 NOTE — DISCHARGE SUMMARY
Discharge Summary    CHIEF COMPLAINT ON ADMISSION  Chief Complaint   Patient presents with   • Eye Pain     x years dx w/optic neuritis   • Blurred Vision     x years dx w/optic neuritis   • Fall     has been having multiple falls since january. denies head trauma. aaox4. gcs 15       Reason for Admission  Headache, Eye pain; Blurry vision     Admission Date  6/4/2021    CODE STATUS  Full Code    HPI & HOSPITAL COURSE  30 y.o. female past medical history of schizophrenia, TONYA, optic neuritis, normal pressure hydrocephalus, transverse myelitis, scoliosis, who presented 6/4/2021 for evaluation of worsening bilateral eye pain.   She sees Dr Yousif, neuro-ophthalmology who advised her to come in.  The pt has no changes to her vision. No cp, no sob, no vomiting.  She has no fall or trauma this week however, describes multiple falls in the past few months     Optic neuritis, multiple falls since January     Dr. Zee, Neuropthalmologist is consulted in the emergency department and recommended to get MRI brain and spine and high dose steroid for 3 days.  Her symptoms continue to improve. MRI brain and spine didn't show any acute findings. I discussed with Dr. Cabral again today who recommended to discharge her with medroldose monroe and follow up with him in the clinic in a week. HH was arranged upon discharge. I saw and examined the patient today.  Please call [phone] to schedule PCP appointment for patient.    Required specialty appointments include:     As below  Therefore, she is discharged in fair and stable condition to home with close outpatient follow-up.    The patient met 2-midnight criteria for an inpatient stay at the time of discharge.    Discharge Date  06/10/21      FOLLOW UP ITEMS POST DISCHARGE      DISCHARGE DIAGNOSES  Principal Problem:    Optic neuritis POA: Yes  Active Problems:    Hyperglycemia POA: Yes    Schizophrenia (HCC) POA: Yes    Hypothyroidism (Chronic) POA: Yes    Transverse  myelitis (HCC) POA: Yes    Normal pressure hydrocephalus (HCC) POA: Yes  Resolved Problems:    * No resolved hospital problems. *      FOLLOW UP  Future Appointments   Date Time Provider Department Center   6/17/2021  8:20 AM John Leon M.D. [facility] None   6/17/2021  9:00 AM AXEL Pearson [facility]   7/7/2021 11:30 AM Kayla Wise D.O. [facility] None   8/9/2021  3:50 PM Ignacia Herrera M.D. [facility] None     Torres Brody M.D.  [address]  [place] [address] [phone]    On 6/15/2021  Please check in at 9:45am for a 10am appointment. Thank You.    Luis Felipe Yousif D.O.  [address]  [place] [address]  [phone]    In 1 week        MEDICATIONS ON DISCHARGE     Medication List      START taking these medications      Instructions   methylPREDNISolone 4 MG Tbpk  Commonly known as: MEDROL DOSEPAK   Follow schedule on package instructions.        CHANGE how you take these medications      Instructions   methocarbamol 750 MG Tabs  What changed: when to take this  Commonly known as: ROBAXIN   Take 1 tablet by mouth 4 times a day. As needed for muscle spasm  Dose: 750 mg     metoprolol SR 25 MG Tb24  What changed: additional instructions  Commonly known as: TOPROL XL   Take 0.5 Tablets by mouth every evening.  Dose: 12.5 mg     mycophenolate 500 MG tablet  What changed: how much to take  Commonly known as: CellCept   Doctor's comments: The patient needs to get future refills from her neuro-ophthalmologist, Dr. Yousif  Take 2 Tablets by mouth 2 times a day.  Dose: 1,000 mg     OXcarbazepine 300 MG Tabs  What changed:   · how much to take  · how to take this  · when to take this  · additional instructions  Commonly known as: TRILEPTAL   TAKE 1 TABLET BY MOUTH TWICE A DAY     Trulicity 1.5 MG/0.5ML Sopn  What changed: when to take this  Generic drug: Dulaglutide   Inject 0.5 mL under the skin every 7 days.  Dose: 0.5 mL     ziprasidone 40 MG Caps  What changed:   · how much  to take  · how to take this  · when to take this  Commonly known as: GEODON   TAKE 1 CAPSULE BY MOUTH TWICE A DAY        CONTINUE taking these medications      Instructions   albuterol 108 (90 Base) MCG/ACT Aers inhalation aerosol   Inhale 2 Puffs every 6 hours as needed for Shortness of Breath.  Dose: 2 Puff     aspirin 81 MG EC tablet   Take 81 mg by mouth every morning.  Dose: 81 mg     baclofen 10 MG Tabs  Commonly known as: LIORESAL   Take 1 tablet by mouth 3 times a day.  Dose: 10 mg     Breo Ellipta 200-25 MCG/INH Aepb  Generic drug: Fluticasone Furoate-Vilanterol   Inhale 1 Puff every morning.  Dose: 1 Puff     busPIRone 30 MG tablet  Commonly known as: BUSPAR   Take 30 mg by mouth 2 times a day.  Dose: 30 mg     Corlanor 7.5 MG Tabs tablet  Generic drug: ivabradine   Take 1 tablet by mouth 2 times a day, with meals.  Dose: 7.5 mg     ferrous sulfate 325 (65 Fe) MG tablet   Take 1 tablet by mouth every morning with breakfast.  Dose: 325 mg     fluoxetine 40 MG capsule  Commonly known as: PROZAC   TAKE 1 CAPSULE BY MOUTH EVERY DAY  Dose: 40 mg     fluticasone-salmeterol 250-50 MCG/DOSE Aepb  Commonly known as: ADVAIR   Inhale 1 Puff 2 times a day for 60 days.  Dose: 1 Puff     ibuprofen 800 MG Tabs  Commonly known as: MOTRIN   Take 1 tablet by mouth every 8 hours as needed for Moderate Pain. Take with food  Dose: 800 mg     ipratropium-albuterol 0.5-2.5 (3) MG/3ML nebulizer solution  Commonly known as: DUONEB   Take 3 mL by nebulization every four hours as needed for Shortness of Breath. Nebulizer  Dose: 3 mL     levothyroxine 100 MCG Tabs  Commonly known as: SYNTHROID   Take 100 mcg by mouth Every morning on an empty stomach.  Dose: 100 mcg     Melatonin 10 MG Tabs   Take 10 mg by mouth every bedtime.  Dose: 10 mg     Myrbetriq 50 MG Tb24  Generic drug: Mirabegron ER   Take 50 mg by mouth every morning.  Dose: 50 mg     omeprazole 40 MG delayed-release capsule  Commonly known as: PRILOSEC   Take 1 capsule by  "mouth every day.  Dose: 40 mg     predniSONE 20 MG Tabs  Commonly known as: DELTASONE   Take 20-60 mg by mouth see administration instructions. 9 day course dispensed 5/27/2021. Take 3 tablets (60 mg) by mouth daily for 3 days, then take 2 tablets (40 mg) daily for 3 days, then take 1 tablet (20 mg) daily for 3 days, then stop.  Dose: 20-60 mg     pregabalin 300 MG capsule  Commonly known as: LYRICA   Take 300 mg by mouth 2 times a day.  Dose: 300 mg     topiramate 50 MG tablet  Commonly known as: TOPAMAX   Take 50 mg by mouth 2 times a day.  Dose: 50 mg     traZODone 100 MG Tabs  Commonly known as: DESYREL   Take 100 mg by mouth at bedtime.  Dose: 100 mg        STOP taking these medications    Zithromax 500 MG tablet  Generic drug: azithromycin            Allergies  Allergies   Allergen Reactions   • Depakote [Divalproex Sodium] Unspecified     Muscle spasms/muscle pain and weakness     • Montelukast [Singulair]      Cardiac effusion   • Amitriptyline Unspecified     Headaches     • Aripiprazole [Abilify] Unspecified     Headaches/muscle twitching     • Clindamycin Nausea     Even with food     • Flagyl [Metronidazole Hcl] Unspecified     \"eye problems\"     • Flomax [Tamsulosin Hydrochloride] Swelling   • Levaquin Unspecified     Severe muscle cramps in legs  RXN=unknown   • Metformin Unspecified     Increased lactic acid      • Tamsulosin Swelling     Swelling of legs   • Tape Rash     Tears skin off  coban with Tegaderm tape ok intermittently  RXN=ongoing   • Vancomycin Itching     Pt becomes flushed in face and chest.   RXN=7/10/16   • Wound Dressing Adhesive Hives     By pt report   • Ampicillin Rash     Pt reports that she received a rash    • Ciprofloxacin Rash         • Keflex Rash     Pt states she gets a rash with this medication  Tolerates ceftriaxone  Can take with Benadryl   • Levofloxacin Unspecified     Leg muscle cramps   • Metronidazole Rash     \"Vision problems\"   • Penicillins Hives     Can take " with Benadryl   • Sulfa Drugs Itching and Myalgia     Muscle pain and weakness   • Valproic Acid Rash     .       DIET  Orders Placed This Encounter   Procedures   • Diet Order Diet: Cardiac     Standing Status:   Standing     Number of Occurrences:   1     Order Specific Question:   Diet:     Answer:   Cardiac [6]       ACTIVITY  As tolerated.  Weight bearing as tolerated    CONSULTATIONS  neuro    PROCEDURES      LABORATORY  Lab Results   Component Value Date    SODIUM 137 06/09/2021    POTASSIUM 4.2 06/09/2021    CHLORIDE 107 06/09/2021    CO2 17 (L) 06/09/2021    GLUCOSE 140 (H) 06/09/2021    BUN 13 06/09/2021    CREATININE 0.65 06/09/2021    CREATININE 0.75 (L) 07/20/2010    GLOMRATE >59 07/20/2010        Lab Results   Component Value Date    WBC 8.0 06/09/2021    WBC 6.1 07/20/2010    HEMOGLOBIN 12.6 06/09/2021    HEMATOCRIT 40.0 06/09/2021    PLATELETCT 158 (L) 06/09/2021        Total time of the discharge process exceeds 39 minutes.

## 2021-06-10 ENCOUNTER — PATIENT OUTREACH (OUTPATIENT)
Dept: HEALTH INFORMATION MANAGEMENT | Facility: OTHER | Age: 31
End: 2021-06-10

## 2021-06-10 VITALS
TEMPERATURE: 99.1 F | OXYGEN SATURATION: 96 % | HEIGHT: 65 IN | HEART RATE: 72 BPM | WEIGHT: 253.53 LBS | RESPIRATION RATE: 18 BRPM | DIASTOLIC BLOOD PRESSURE: 66 MMHG | BODY MASS INDEX: 42.24 KG/M2 | SYSTOLIC BLOOD PRESSURE: 115 MMHG

## 2021-06-10 LAB
BASOPHILS # BLD AUTO: 0.3 % (ref 0–1.8)
BASOPHILS # BLD: 0.02 K/UL (ref 0–0.12)
EOSINOPHIL # BLD AUTO: 0.03 K/UL (ref 0–0.51)
EOSINOPHIL NFR BLD: 0.5 % (ref 0–6.9)
ERYTHROCYTE [DISTWIDTH] IN BLOOD BY AUTOMATED COUNT: 46.1 FL (ref 35.9–50)
HCT VFR BLD AUTO: 36.9 % (ref 37–47)
HGB BLD-MCNC: 11.9 G/DL (ref 12–16)
IMM GRANULOCYTES # BLD AUTO: 0.04 K/UL (ref 0–0.11)
IMM GRANULOCYTES NFR BLD AUTO: 0.7 % (ref 0–0.9)
LYMPHOCYTES # BLD AUTO: 1.96 K/UL (ref 1–4.8)
LYMPHOCYTES NFR BLD: 32.8 % (ref 22–41)
MCH RBC QN AUTO: 29.1 PG (ref 27–33)
MCHC RBC AUTO-ENTMCNC: 32.2 G/DL (ref 33.6–35)
MCV RBC AUTO: 90.2 FL (ref 81.4–97.8)
MONOCYTES # BLD AUTO: 0.41 K/UL (ref 0–0.85)
MONOCYTES NFR BLD AUTO: 6.9 % (ref 0–13.4)
NEUTROPHILS # BLD AUTO: 3.52 K/UL (ref 2–7.15)
NEUTROPHILS NFR BLD: 58.8 % (ref 44–72)
NRBC # BLD AUTO: 0 K/UL
NRBC BLD-RTO: 0 /100 WBC
PLATELET # BLD AUTO: 140 K/UL (ref 164–446)
PMV BLD AUTO: 11.3 FL (ref 9–12.9)
RBC # BLD AUTO: 4.09 M/UL (ref 4.2–5.4)
WBC # BLD AUTO: 6 K/UL (ref 4.8–10.8)

## 2021-06-10 PROCEDURE — 700111 HCHG RX REV CODE 636 W/ 250 OVERRIDE (IP): Performed by: STUDENT IN AN ORGANIZED HEALTH CARE EDUCATION/TRAINING PROGRAM

## 2021-06-10 PROCEDURE — 700102 HCHG RX REV CODE 250 W/ 637 OVERRIDE(OP): Performed by: INTERNAL MEDICINE

## 2021-06-10 PROCEDURE — 85025 COMPLETE CBC W/AUTO DIFF WBC: CPT

## 2021-06-10 PROCEDURE — 99239 HOSP IP/OBS DSCHRG MGMT >30: CPT | Performed by: INTERNAL MEDICINE

## 2021-06-10 PROCEDURE — A9270 NON-COVERED ITEM OR SERVICE: HCPCS | Performed by: INTERNAL MEDICINE

## 2021-06-10 PROCEDURE — 36415 COLL VENOUS BLD VENIPUNCTURE: CPT

## 2021-06-10 PROCEDURE — 700102 HCHG RX REV CODE 250 W/ 637 OVERRIDE(OP): Performed by: STUDENT IN AN ORGANIZED HEALTH CARE EDUCATION/TRAINING PROGRAM

## 2021-06-10 PROCEDURE — A9270 NON-COVERED ITEM OR SERVICE: HCPCS | Performed by: STUDENT IN AN ORGANIZED HEALTH CARE EDUCATION/TRAINING PROGRAM

## 2021-06-10 RX ADMIN — DOCUSATE SODIUM 50 MG AND SENNOSIDES 8.6 MG 2 TABLET: 8.6; 5 TABLET, FILM COATED ORAL at 04:41

## 2021-06-10 RX ADMIN — ENOXAPARIN SODIUM 40 MG: 40 INJECTION SUBCUTANEOUS at 04:41

## 2021-06-10 RX ADMIN — OXYCODONE 5 MG: 5 TABLET ORAL at 08:06

## 2021-06-10 RX ADMIN — OXYCODONE 5 MG: 5 TABLET ORAL at 03:43

## 2021-06-10 RX ADMIN — MYCOPHENOLATE MOFETIL 500 MG: 250 CAPSULE ORAL at 08:05

## 2021-06-10 RX ADMIN — PREGABALIN 300 MG: 150 CAPSULE ORAL at 04:41

## 2021-06-10 RX ADMIN — ZIPRASIDONE HYDROCHLORIDE 40 MG: 40 CAPSULE ORAL at 04:41

## 2021-06-10 RX ADMIN — OXCARBAZEPINE 300 MG: 300 TABLET, FILM COATED ORAL at 08:06

## 2021-06-10 RX ADMIN — BUSPIRONE HYDROCHLORIDE 30 MG: 10 TABLET ORAL at 08:06

## 2021-06-10 RX ADMIN — TOPIRAMATE 50 MG: 25 TABLET, FILM COATED ORAL at 08:05

## 2021-06-10 RX ADMIN — FERROUS SULFATE TAB 325 MG (65 MG ELEMENTAL FE) 325 MG: 325 (65 FE) TAB at 08:05

## 2021-06-10 RX ADMIN — FLUOXETINE 40 MG: 20 CAPSULE ORAL at 04:41

## 2021-06-10 RX ADMIN — ASPIRIN 81 MG: 81 TABLET, COATED ORAL at 04:41

## 2021-06-10 RX ADMIN — LEVOTHYROXINE SODIUM 100 MCG: 0.1 TABLET ORAL at 04:40

## 2021-06-10 RX ADMIN — PROMETHAZINE HYDROCHLORIDE 12.5 MG: 25 TABLET ORAL at 01:55

## 2021-06-10 RX ADMIN — BACLOFEN 10 MG: 10 TABLET ORAL at 04:40

## 2021-06-10 RX ADMIN — IVABRADINE 7.5 MG: 7.5 TABLET, FILM COATED ORAL at 08:06

## 2021-06-10 ASSESSMENT — PAIN DESCRIPTION - PAIN TYPE
TYPE: ACUTE PAIN;CHRONIC PAIN
TYPE: ACUTE PAIN
TYPE: ACUTE PAIN;CHRONIC PAIN

## 2021-06-10 NOTE — DISCHARGE PLANNING
Anticipated Discharge Disposition:   Home  [facility]    Action:    Pt admitted with bilateral eye pain.    RN CM spoke to patient at nurse's station yesterday.  Pt sitting in cardiac chair.  She stated she lives with her grandmother in a single story house in [place].  She stated that she has assistance from her grandmother as needed.  Pt has a ramp, cane, walker, wc, bedside commode, tub transfer bench and hospital bed with a trapeze.  We discussed home health and patient was agreeable to referrals to [facility], [facility] and [facility].  Choice form faxed to [facility].  Pt has been accepted with [facility].    RN CM spoke to patient's grandmother Vivienne via telephone yesterday.  She stated that she is 84 yoa and feels that she needs more help.  Patient fell twice last week, she is wearing heavy boots on both feet.  Patient receives $800 per month from social security.  She sleeps a lot during the day and her main activity is computer kevon.  Discussed caregivers and adult day health center.  She believed these would be good for patient and help her as well.  RN CM discussed with patient and she was agreeable to referral to adult day health center.      Voalte msg to Dr. Casas for quantiferon tb test.  Kristin stated she tests + for Tb and was treated in [place] as a child.    Vivienne stated she will attempt to track down the information to provide to SONYA RUIZ.    Barriers to Discharge:    None    Plan:    DC planned today.    Care Transition Team Assessment    Information Source  Orientation Level: Oriented X4  Information Given By: Patient, Relative  Who is responsible for making decisions for patient? : Patient    Readmission Evaluation  Is this a readmission?: No    Elopement Risk  Legal Hold: No  Ambulatory or Self Mobile in Wheelchair: No-Not an Elopement Risk  Elopement Risk: Not at Risk for Elopement    Interdisciplinary Discharge Planning  Lives with  - Patient's Self Care Capacity:  (grandmother; aunt lives on property)  Patient or legal guardian wants to designate a caregiver: No  Housing / Facility: [address]  Prior Services: Intermittent Physical Support for ADL Per Family    Discharge Preparedness  What is your plan after discharge?: Home with help, Home health care  What are your discharge supports?: Parent, Grandparent  Prior Functional Level: Ambulatory, Needs Assist with Activities of Daily Living, Needs Assist with Medication Management, Uses Cane, Uses Walker, Uses Wheelchair  Difficulity with ADLs: Bathing, Brushing teeth, Dressing, Eating, Toileting, Walking  Difficulity with IADLs: Cooking, Driving, Keeping track of finances, Laundry, Managing medication, Shopping    Functional Assesment  Prior Functional Level: Ambulatory, Needs Assist with Activities of Daily Living, Needs Assist with Medication Management, Uses Cane, Uses Walker, Uses Wheelchair    Finances  Financial Barriers to Discharge: No  Prescription Coverage: Yes    Vision / Hearing Impairment  Vision Impairment : Yes  Right Eye Vision: Impaired, Other (Comments) (blurry, film)  Left Eye Vision: Impaired, Other (Comments) (blurry, film)  Hearing Impairment : No         Advance Directive  Advance Directive?: DPOA for Health Care    Domestic Abuse  Have you ever been the victim of abuse or violence?: No  Physical Abuse or Sexual Abuse: No  Verbal Abuse or Emotional Abuse: No  Possible Abuse/Neglect Reported to:: Not Applicable         Discharge Risks or Barriers  Discharge risks or barriers?: Mental health, Complex medical needs  Patient risk factors: Bariatric, Cognitive / sensory / physical deficit, Complex medical needs, Mental health, Multiple ED visits    Anticipated Discharge Information  Discharge Disposition: D/T to home under HHA care in anticipation of covered skilled care (06)

## 2021-06-10 NOTE — CARE PLAN
The patient is Stable - Low risk of patient condition declining or worsening    Shift Goals  Clinical Goals: Pain control.  Patient Goals: sleep    Progress made toward(s) clinical / shift goals:  Pt. Request pain medication less frequently today and was able to tolerate sitting up in her recliner for several hours.

## 2021-06-10 NOTE — PROGRESS NOTES
Pt. Discharge to Home accompanied by Family, all personal belongings accounted for and sent home with patient. All discharge information given to patient and states an understanding. Escorted patient out of facility via wheelchair. VSS at time of discharge. Left facility in stable condition.

## 2021-06-10 NOTE — DISCHARGE INSTRUCTIONS
Discharge Instructions    Discharged to home by car with relative. Discharged via wheelchair, hospital escort: Yes.  Special equipment needed: patient's home ortho boots    Be sure to schedule a follow-up appointment with your primary care doctor or any specialists as instructed.     Discharge Plan: Discussed  Diet Plan: Discussed  Activity Level: Discussed  Confirmed Follow up Appointment: Appointment Scheduled  Confirmed Symptoms Management: Discussed  Medication Reconciliation Updated: Yes    I understand that a diet low in cholesterol, fat, and sodium is recommended for good health. Unless I have been given specific instructions below for another diet, I accept this instruction as my diet prescription.   Other diet: Cardiac    Special Instructions: None    · Is patient discharged on Warfarin / Coumadin?   No     Depression / Suicide Risk    As you are discharged from this [facility] facility, it is important to learn how to keep safe from harming yourself.    Recognize the warning signs:  · Abrupt changes in personality, positive or negative- including increase in energy   · Giving away possessions  · Change in eating patterns- significant weight changes-  positive or negative  · Change in sleeping patterns- unable to sleep or sleeping all the time   · Unwillingness or inability to communicate  · Depression  · Unusual sadness, discouragement and loneliness  · Talk of wanting to die  · Neglect of personal appearance   · Rebelliousness- reckless behavior  · Withdrawal from people/activities they love  · Confusion- inability to concentrate     If you or a loved one observes any of these behaviors or has concerns about self-harm, here's what you can do:  · Talk about it- your feelings and reasons for harming yourself  · Remove any means that you might use to hurt yourself (examples: pills, rope, extension cords, firearm)  · Get professional help from the community (Mental Health, Substance Abuse, psychological  counseling)  · Do not be alone:Call your Safe Contact- someone whom you trust who will be there for you.  · Call your local CRISIS HOTLINE 525-2508 or 748-719-8618  · Call your local Children's Mobile Crisis Response Team [place] (540) 002-8544 or www.Local.com  · Call the toll free National Suicide Prevention Hotlines   · National Suicide Prevention Lifeline 663-492-VODN (5387)  · National Hope Line Network 800-SUICIDE (981-0228)

## 2021-06-10 NOTE — CARE PLAN
The patient is Stable - Low risk of patient condition declining or worsening    Shift Goals  Clinical Goals: Pain control  Patient Goals: sleep    Progress made toward(s) clinical / shift goals:  Patient set to discharge in AM, waiting for home health to be set up. Patient's pain controlled by medication and heat packs. Fall precautions in place. Low airloss mattress, patient turning self. Therapeutic communication provided. Hourly rounding continues.    Patient is not progressing towards the following goals:N/A

## 2021-06-16 ENCOUNTER — APPOINTMENT (OUTPATIENT)
Dept: RADIOLOGY | Facility: MEDICAL CENTER | Age: 31
End: 2021-06-16
Attending: EMERGENCY MEDICINE
Payer: MEDICARE

## 2021-06-16 ENCOUNTER — HOSPITAL ENCOUNTER (OUTPATIENT)
Facility: MEDICAL CENTER | Age: 31
End: 2021-06-17
Attending: EMERGENCY MEDICINE | Admitting: HOSPITALIST
Payer: MEDICARE

## 2021-06-16 DIAGNOSIS — M54.50 ACUTE EXACERBATION OF CHRONIC LOW BACK PAIN: ICD-10-CM

## 2021-06-16 DIAGNOSIS — R26.2 UNABLE TO AMBULATE: ICD-10-CM
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
## 2021-06-16 DIAGNOSIS — G89.29 ACUTE EXACERBATION OF CHRONIC LOW BACK PAIN: ICD-10-CM

## 2021-06-16 PROBLEM — M54.42 ACUTE BILATERAL LOW BACK PAIN WITH BILATERAL SCIATICA: Status: ACTIVE | Noted: 2021-06-16

## 2021-06-16 PROBLEM — M54.41 ACUTE BILATERAL LOW BACK PAIN WITH BILATERAL SCIATICA: Status: ACTIVE | Noted: 2021-06-16

## 2021-06-16 LAB
ALBUMIN SERPL BCP-MCNC: 3.4 G/DL (ref 3.2–4.9)
ALBUMIN/GLOB SERPL: 2.3 G/DL
ALP SERPL-CCNC: 72 U/L (ref 30–99)
ALT SERPL-CCNC: 25 U/L (ref 2–50)
ANION GAP SERPL CALC-SCNC: 10 MMOL/L (ref 7–16)
APTT PPP: 29.5 SEC (ref 24.7–36)
AST SERPL-CCNC: 13 U/L (ref 12–45)
BASOPHILS # BLD AUTO: 0.4 % (ref 0–1.8)
BASOPHILS # BLD: 0.02 K/UL (ref 0–0.12)
BILIRUB SERPL-MCNC: 0.2 MG/DL (ref 0.1–1.5)
BUN SERPL-MCNC: 13 MG/DL (ref 8–22)
CALCIUM SERPL-MCNC: 7.3 MG/DL (ref 8.5–10.5)
CHLORIDE SERPL-SCNC: 115 MMOL/L (ref 96–112)
CO2 SERPL-SCNC: 17 MMOL/L (ref 20–33)
CORTIS SERPL-MCNC: 1.7 UG/DL (ref 0–23)
CREAT SERPL-MCNC: 0.64 MG/DL (ref 0.5–1.4)
EKG IMPRESSION: NORMAL
EOSINOPHIL # BLD AUTO: 0.14 K/UL (ref 0–0.51)
EOSINOPHIL NFR BLD: 2.5 % (ref 0–6.9)
ERYTHROCYTE [DISTWIDTH] IN BLOOD BY AUTOMATED COUNT: 47.1 FL (ref 35.9–50)
ERYTHROCYTE [SEDIMENTATION RATE] IN BLOOD BY WESTERGREN METHOD: 2 MM/HOUR (ref 0–25)
GLOBULIN SER CALC-MCNC: 1.5 G/DL (ref 1.9–3.5)
GLUCOSE SERPL-MCNC: 87 MG/DL (ref 65–99)
HCG SERPL QL: NEGATIVE
HCT VFR BLD AUTO: 39.9 % (ref 37–47)
HGB BLD-MCNC: 12.9 G/DL (ref 12–16)
IMM GRANULOCYTES # BLD AUTO: 0.02 K/UL (ref 0–0.11)
IMM GRANULOCYTES NFR BLD AUTO: 0.4 % (ref 0–0.9)
INR PPP: 1.19 (ref 0.87–1.13)
LYMPHOCYTES # BLD AUTO: 1.53 K/UL (ref 1–4.8)
LYMPHOCYTES NFR BLD: 26.8 % (ref 22–41)
MCH RBC QN AUTO: 29.3 PG (ref 27–33)
MCHC RBC AUTO-ENTMCNC: 32.3 G/DL (ref 33.6–35)
MCV RBC AUTO: 90.7 FL (ref 81.4–97.8)
MONOCYTES # BLD AUTO: 0.48 K/UL (ref 0–0.85)
MONOCYTES NFR BLD AUTO: 8.4 % (ref 0–13.4)
NEUTROPHILS # BLD AUTO: 3.52 K/UL (ref 2–7.15)
NEUTROPHILS NFR BLD: 61.5 % (ref 44–72)
NRBC # BLD AUTO: 0 K/UL
NRBC BLD-RTO: 0 /100 WBC
PLATELET # BLD AUTO: 168 K/UL (ref 164–446)
PMV BLD AUTO: 12.3 FL (ref 9–12.9)
POTASSIUM SERPL-SCNC: 3.3 MMOL/L (ref 3.6–5.5)
PROT SERPL-MCNC: 4.9 G/DL (ref 6–8.2)
PROTHROMBIN TIME: 14.8 SEC (ref 12–14.6)
RBC # BLD AUTO: 4.4 M/UL (ref 4.2–5.4)
SARS-COV-2 RNA RESP QL NAA+PROBE: NOTDETECTED
SODIUM SERPL-SCNC: 142 MMOL/L (ref 135–145)
SPECIMEN SOURCE: NORMAL
TROPONIN T SERPL-MCNC: <6 NG/L (ref 6–19)
WBC # BLD AUTO: 5.7 K/UL (ref 4.8–10.8)

## 2021-06-16 PROCEDURE — 85025 COMPLETE CBC W/AUTO DIFF WBC: CPT

## 2021-06-16 PROCEDURE — 96372 THER/PROPH/DIAG INJ SC/IM: CPT | Mod: XU

## 2021-06-16 PROCEDURE — 72131 CT LUMBAR SPINE W/O DYE: CPT | Mod: MG

## 2021-06-16 PROCEDURE — 96376 TX/PRO/DX INJ SAME DRUG ADON: CPT

## 2021-06-16 PROCEDURE — G0378 HOSPITAL OBSERVATION PER HR: HCPCS

## 2021-06-16 PROCEDURE — 85610 PROTHROMBIN TIME: CPT

## 2021-06-16 PROCEDURE — 99219 PR INITIAL OBSERVATION CARE,LEVL II: CPT | Performed by: HOSPITALIST

## 2021-06-16 PROCEDURE — 96375 TX/PRO/DX INJ NEW DRUG ADDON: CPT

## 2021-06-16 PROCEDURE — 700111 HCHG RX REV CODE 636 W/ 250 OVERRIDE (IP): Performed by: HOSPITALIST

## 2021-06-16 PROCEDURE — 85652 RBC SED RATE AUTOMATED: CPT

## 2021-06-16 PROCEDURE — U0003 INFECTIOUS AGENT DETECTION BY NUCLEIC ACID (DNA OR RNA); SEVERE ACUTE RESPIRATORY SYNDROME CORONAVIRUS 2 (SARS-COV-2) (CORONAVIRUS DISEASE [COVID-19]), AMPLIFIED PROBE TECHNIQUE, MAKING USE OF HIGH THROUGHPUT TECHNOLOGIES AS DESCRIBED BY CMS-2020-01-R: HCPCS

## 2021-06-16 PROCEDURE — 84703 CHORIONIC GONADOTROPIN ASSAY: CPT

## 2021-06-16 PROCEDURE — 84484 ASSAY OF TROPONIN QUANT: CPT

## 2021-06-16 PROCEDURE — 99285 EMERGENCY DEPT VISIT HI MDM: CPT

## 2021-06-16 PROCEDURE — 80053 COMPREHEN METABOLIC PANEL: CPT

## 2021-06-16 PROCEDURE — 700105 HCHG RX REV CODE 258: Performed by: HOSPITALIST

## 2021-06-16 PROCEDURE — 82533 TOTAL CORTISOL: CPT

## 2021-06-16 PROCEDURE — 72125 CT NECK SPINE W/O DYE: CPT | Mod: MG

## 2021-06-16 PROCEDURE — 700102 HCHG RX REV CODE 250 W/ 637 OVERRIDE(OP): Performed by: HOSPITALIST

## 2021-06-16 PROCEDURE — 96374 THER/PROPH/DIAG INJ IV PUSH: CPT

## 2021-06-16 PROCEDURE — 700101 HCHG RX REV CODE 250: Performed by: HOSPITALIST

## 2021-06-16 PROCEDURE — 700111 HCHG RX REV CODE 636 W/ 250 OVERRIDE (IP): Performed by: EMERGENCY MEDICINE

## 2021-06-16 PROCEDURE — 93010 ELECTROCARDIOGRAM REPORT: CPT | Performed by: INTERNAL MEDICINE

## 2021-06-16 PROCEDURE — 85730 THROMBOPLASTIN TIME PARTIAL: CPT

## 2021-06-16 PROCEDURE — 72128 CT CHEST SPINE W/O DYE: CPT | Mod: MG

## 2021-06-16 PROCEDURE — 93005 ELECTROCARDIOGRAM TRACING: CPT | Performed by: HOSPITALIST

## 2021-06-16 PROCEDURE — A9270 NON-COVERED ITEM OR SERVICE: HCPCS | Performed by: HOSPITALIST

## 2021-06-16 PROCEDURE — U0005 INFEC AGEN DETEC AMPLI PROBE: HCPCS

## 2021-06-16 RX ORDER — CHOLECALCIFEROL (VITAMIN D3) 125 MCG
10 CAPSULE ORAL
Status: DISCONTINUED | OUTPATIENT
Start: 2021-06-16 | End: 2021-06-17 | Stop reason: HOSPADM

## 2021-06-16 RX ORDER — PROMETHAZINE HYDROCHLORIDE 25 MG/1
12.5-25 SUPPOSITORY RECTAL EVERY 4 HOURS PRN
Status: DISCONTINUED | OUTPATIENT
Start: 2021-06-16 | End: 2021-06-17 | Stop reason: HOSPADM

## 2021-06-16 RX ORDER — METOPROLOL SUCCINATE 25 MG/1
12.5 TABLET, EXTENDED RELEASE ORAL EVERY EVENING
Status: DISCONTINUED | OUTPATIENT
Start: 2021-06-16 | End: 2021-06-17 | Stop reason: HOSPADM

## 2021-06-16 RX ORDER — BISACODYL 10 MG
10 SUPPOSITORY, RECTAL RECTAL
Status: DISCONTINUED | OUTPATIENT
Start: 2021-06-16 | End: 2021-06-17 | Stop reason: HOSPADM

## 2021-06-16 RX ORDER — TOPIRAMATE 25 MG/1
50 TABLET ORAL 2 TIMES DAILY
Status: DISCONTINUED | OUTPATIENT
Start: 2021-06-16 | End: 2021-06-17 | Stop reason: HOSPADM

## 2021-06-16 RX ORDER — BACLOFEN 10 MG/1
10 TABLET ORAL 3 TIMES DAILY
Status: DISCONTINUED | OUTPATIENT
Start: 2021-06-16 | End: 2021-06-17 | Stop reason: HOSPADM

## 2021-06-16 RX ORDER — PREDNISONE 20 MG/1
40 TABLET ORAL 2 TIMES DAILY
Status: DISCONTINUED | OUTPATIENT
Start: 2021-06-16 | End: 2021-06-17 | Stop reason: HOSPADM

## 2021-06-16 RX ORDER — MYCOPHENOLATE MOFETIL 250 MG/1
500 CAPSULE ORAL 2 TIMES DAILY
Status: DISCONTINUED | OUTPATIENT
Start: 2021-06-16 | End: 2021-06-17 | Stop reason: HOSPADM

## 2021-06-16 RX ORDER — BUSPIRONE HYDROCHLORIDE 30 MG/1
30 TABLET ORAL 2 TIMES DAILY
Status: DISCONTINUED | OUTPATIENT
Start: 2021-06-16 | End: 2021-06-17 | Stop reason: HOSPADM

## 2021-06-16 RX ORDER — OXCARBAZEPINE 300 MG/1
300 TABLET, FILM COATED ORAL 2 TIMES DAILY
Status: DISCONTINUED | OUTPATIENT
Start: 2021-06-16 | End: 2021-06-17 | Stop reason: HOSPADM

## 2021-06-16 RX ORDER — AMOXICILLIN 250 MG
2 CAPSULE ORAL 2 TIMES DAILY
Status: DISCONTINUED | OUTPATIENT
Start: 2021-06-16 | End: 2021-06-17 | Stop reason: HOSPADM

## 2021-06-16 RX ORDER — KETOROLAC TROMETHAMINE 30 MG/ML
30 INJECTION, SOLUTION INTRAMUSCULAR; INTRAVENOUS EVERY 6 HOURS PRN
Status: DISCONTINUED | OUTPATIENT
Start: 2021-06-16 | End: 2021-06-17 | Stop reason: HOSPADM

## 2021-06-16 RX ORDER — ZIPRASIDONE HYDROCHLORIDE 40 MG/1
40 CAPSULE ORAL 2 TIMES DAILY
Status: DISCONTINUED | OUTPATIENT
Start: 2021-06-16 | End: 2021-06-17 | Stop reason: HOSPADM

## 2021-06-16 RX ORDER — POLYETHYLENE GLYCOL 3350 17 G/17G
1 POWDER, FOR SOLUTION ORAL
Status: DISCONTINUED | OUTPATIENT
Start: 2021-06-16 | End: 2021-06-17 | Stop reason: HOSPADM

## 2021-06-16 RX ORDER — OXYCODONE HYDROCHLORIDE 5 MG/1
5 TABLET ORAL EVERY 6 HOURS PRN
Status: DISCONTINUED | OUTPATIENT
Start: 2021-06-16 | End: 2021-06-17 | Stop reason: HOSPADM

## 2021-06-16 RX ORDER — BUDESONIDE AND FORMOTEROL FUMARATE DIHYDRATE 160; 4.5 UG/1; UG/1
2 AEROSOL RESPIRATORY (INHALATION)
Status: DISCONTINUED | OUTPATIENT
Start: 2021-06-16 | End: 2021-06-17 | Stop reason: HOSPADM

## 2021-06-16 RX ORDER — DIPHENHYDRAMINE HYDROCHLORIDE, ZINC ACETATE 2; .1 G/100G; G/100G
CREAM TOPICAL 3 TIMES DAILY PRN
Status: DISCONTINUED | OUTPATIENT
Start: 2021-06-16 | End: 2021-06-17 | Stop reason: HOSPADM

## 2021-06-16 RX ORDER — OMEPRAZOLE 20 MG/1
40 CAPSULE, DELAYED RELEASE ORAL DAILY
Status: DISCONTINUED | OUTPATIENT
Start: 2021-06-16 | End: 2021-06-17 | Stop reason: HOSPADM

## 2021-06-16 RX ORDER — METHOCARBAMOL 750 MG/1
750 TABLET, FILM COATED ORAL
Status: DISCONTINUED | OUTPATIENT
Start: 2021-06-16 | End: 2021-06-17 | Stop reason: HOSPADM

## 2021-06-16 RX ORDER — ONDANSETRON 4 MG/1
4 TABLET, ORALLY DISINTEGRATING ORAL EVERY 4 HOURS PRN
Status: DISCONTINUED | OUTPATIENT
Start: 2021-06-16 | End: 2021-06-17 | Stop reason: HOSPADM

## 2021-06-16 RX ORDER — FLUOXETINE HYDROCHLORIDE 20 MG/1
40 CAPSULE ORAL DAILY
Status: DISCONTINUED | OUTPATIENT
Start: 2021-06-16 | End: 2021-06-17 | Stop reason: HOSPADM

## 2021-06-16 RX ORDER — ONDANSETRON 2 MG/ML
4 INJECTION INTRAMUSCULAR; INTRAVENOUS EVERY 4 HOURS PRN
Status: DISCONTINUED | OUTPATIENT
Start: 2021-06-16 | End: 2021-06-17 | Stop reason: HOSPADM

## 2021-06-16 RX ORDER — PREGABALIN 100 MG/1
300 CAPSULE ORAL 2 TIMES DAILY
Status: DISCONTINUED | OUTPATIENT
Start: 2021-06-16 | End: 2021-06-17 | Stop reason: HOSPADM

## 2021-06-16 RX ORDER — LEVOTHYROXINE SODIUM 0.05 MG/1
100 TABLET ORAL
Status: DISCONTINUED | OUTPATIENT
Start: 2021-06-16 | End: 2021-06-17 | Stop reason: HOSPADM

## 2021-06-16 RX ORDER — PROCHLORPERAZINE EDISYLATE 5 MG/ML
5-10 INJECTION INTRAMUSCULAR; INTRAVENOUS EVERY 4 HOURS PRN
Status: DISCONTINUED | OUTPATIENT
Start: 2021-06-16 | End: 2021-06-17 | Stop reason: HOSPADM

## 2021-06-16 RX ORDER — TRAZODONE HYDROCHLORIDE 50 MG/1
100 TABLET ORAL
Status: DISCONTINUED | OUTPATIENT
Start: 2021-06-16 | End: 2021-06-17 | Stop reason: HOSPADM

## 2021-06-16 RX ORDER — SODIUM CHLORIDE 9 MG/ML
500 INJECTION, SOLUTION INTRAVENOUS ONCE
Status: COMPLETED | OUTPATIENT
Start: 2021-06-16 | End: 2021-06-16

## 2021-06-16 RX ORDER — LIDOCAINE 50 MG/G
1 PATCH TOPICAL EVERY 24 HOURS
Status: DISCONTINUED | OUTPATIENT
Start: 2021-06-16 | End: 2021-06-17 | Stop reason: HOSPADM

## 2021-06-16 RX ORDER — PROMETHAZINE HYDROCHLORIDE 25 MG/1
12.5-25 TABLET ORAL EVERY 4 HOURS PRN
Status: DISCONTINUED | OUTPATIENT
Start: 2021-06-16 | End: 2021-06-17 | Stop reason: HOSPADM

## 2021-06-16 RX ADMIN — IVABRADINE 7.5 MG: 7.5 TABLET, FILM COATED ORAL at 22:17

## 2021-06-16 RX ADMIN — OXYCODONE 5 MG: 5 TABLET ORAL at 20:50

## 2021-06-16 RX ADMIN — ZIPRASIDONE HYDROCHLORIDE 40 MG: 40 CAPSULE ORAL at 18:06

## 2021-06-16 RX ADMIN — TOPIRAMATE 50 MG: 25 TABLET, FILM COATED ORAL at 18:06

## 2021-06-16 RX ADMIN — PREDNISONE 40 MG: 20 TABLET ORAL at 12:27

## 2021-06-16 RX ADMIN — ONDANSETRON 4 MG: 2 INJECTION INTRAMUSCULAR; INTRAVENOUS at 23:30

## 2021-06-16 RX ADMIN — BUSPIRONE HYDROCHLORIDE 30 MG: 30 TABLET ORAL at 18:07

## 2021-06-16 RX ADMIN — FENTANYL CITRATE 50 MCG: 50 INJECTION, SOLUTION INTRAMUSCULAR; INTRAVENOUS at 05:44

## 2021-06-16 RX ADMIN — ASPIRIN 81 MG: 81 TABLET, COATED ORAL at 12:28

## 2021-06-16 RX ADMIN — Medication 10 MG: at 20:50

## 2021-06-16 RX ADMIN — MYCOPHENOLATE MOFETIL 500 MG: 250 CAPSULE ORAL at 18:07

## 2021-06-16 RX ADMIN — PREGABALIN 300 MG: 100 CAPSULE ORAL at 14:28

## 2021-06-16 RX ADMIN — BACLOFEN 10 MG: 10 TABLET ORAL at 18:07

## 2021-06-16 RX ADMIN — LEVOTHYROXINE SODIUM 100 MCG: 0.05 TABLET ORAL at 12:27

## 2021-06-16 RX ADMIN — KETOROLAC TROMETHAMINE 30 MG: 30 INJECTION, SOLUTION INTRAMUSCULAR; INTRAVENOUS at 13:25

## 2021-06-16 RX ADMIN — TRAZODONE HYDROCHLORIDE 100 MG: 50 TABLET ORAL at 20:50

## 2021-06-16 RX ADMIN — OXYCODONE 5 MG: 5 TABLET ORAL at 12:27

## 2021-06-16 RX ADMIN — LIDOCAINE 1 PATCH: 50 PATCH TOPICAL at 13:25

## 2021-06-16 RX ADMIN — FLUOXETINE 40 MG: 20 CAPSULE ORAL at 12:27

## 2021-06-16 RX ADMIN — DOCUSATE SODIUM 50 MG AND SENNOSIDES 8.6 MG 2 TABLET: 8.6; 5 TABLET, FILM COATED ORAL at 12:28

## 2021-06-16 RX ADMIN — SODIUM CHLORIDE 500 ML: 9 INJECTION, SOLUTION INTRAVENOUS at 16:57

## 2021-06-16 RX ADMIN — ZIPRASIDONE HYDROCHLORIDE 40 MG: 40 CAPSULE ORAL at 12:29

## 2021-06-16 RX ADMIN — PREGABALIN 300 MG: 100 CAPSULE ORAL at 18:06

## 2021-06-16 RX ADMIN — OMEPRAZOLE 40 MG: 20 CAPSULE, DELAYED RELEASE ORAL at 12:27

## 2021-06-16 RX ADMIN — BACLOFEN 10 MG: 10 TABLET ORAL at 12:28

## 2021-06-16 RX ADMIN — ENOXAPARIN SODIUM 40 MG: 40 INJECTION SUBCUTANEOUS at 18:05

## 2021-06-16 RX ADMIN — KETOROLAC TROMETHAMINE 30 MG: 30 INJECTION, SOLUTION INTRAMUSCULAR; INTRAVENOUS at 22:17

## 2021-06-16 RX ADMIN — METHOCARBAMOL 750 MG: 750 TABLET ORAL at 20:49

## 2021-06-16 RX ADMIN — PREDNISONE 40 MG: 20 TABLET ORAL at 18:06

## 2021-06-16 RX ADMIN — OXCARBAZEPINE 300 MG: 300 TABLET, FILM COATED ORAL at 18:07

## 2021-06-16 ASSESSMENT — LIFESTYLE VARIABLES
TOTAL SCORE: 0
HOW MANY TIMES IN THE PAST YEAR HAVE YOU HAD 5 OR MORE DRINKS IN A DAY: 0
TOTAL SCORE: 0
HAVE PEOPLE ANNOYED YOU BY CRITICIZING YOUR DRINKING: NO
TOTAL SCORE: 0
EVER FELT BAD OR GUILTY ABOUT YOUR DRINKING: NO
ALCOHOL_USE: NO
CONSUMPTION TOTAL: NEGATIVE
ON A TYPICAL DAY WHEN YOU DRINK ALCOHOL HOW MANY DRINKS DO YOU HAVE: 0
AVERAGE NUMBER OF DAYS PER WEEK YOU HAVE A DRINK CONTAINING ALCOHOL: 0
HAVE YOU EVER FELT YOU SHOULD CUT DOWN ON YOUR DRINKING: NO
EVER HAD A DRINK FIRST THING IN THE MORNING TO STEADY YOUR NERVES TO GET RID OF A HANGOVER: NO

## 2021-06-16 ASSESSMENT — ENCOUNTER SYMPTOMS
FEVER: 0
MYALGIAS: 0
SENSORY CHANGE: 1
NECK PAIN: 0
ABDOMINAL PAIN: 0
DIZZINESS: 0
NAUSEA: 0
SHORTNESS OF BREATH: 0
BACK PAIN: 1
NERVOUS/ANXIOUS: 0
HEADACHES: 0

## 2021-06-16 ASSESSMENT — FIBROSIS 4 INDEX
FIB4 SCORE: 0.48
FIB4 SCORE: 0.32

## 2021-06-16 ASSESSMENT — PAIN SCALES - WONG BAKER: WONGBAKER_NUMERICALRESPONSE: HURTS A WHOLE LOT

## 2021-06-16 ASSESSMENT — PAIN DESCRIPTION - PAIN TYPE
TYPE: ACUTE PAIN

## 2021-06-16 NOTE — ED TRIAGE NOTES
Chief Complaint   Patient presents with   • GLF     BIBA after falling out of bed this evening. Pt states she was half asleep and isn't sure how it happened, but reports a 3-4 foot drop. Pt reports chronic back pain with surgeries and states after the fall she has new right elbow pain, pain from her neck to her tailbone, left leg numbness, and right leg dullness. Pt reports she is primarily wheelchair bound at baseline and walks short distances at times.     Received 4mg zofran and 100mcg fentanyl en route

## 2021-06-16 NOTE — ASSESSMENT & PLAN NOTE
CT of T and L-spine unremarkable  MRI of lumbar and thoracic spine ordered  Feel there might be a psychological component to this.  Patient I believe does have an acute component of pain likely from bruising and acute trauma from her fall but I do not suspect given the normal CT that she has the severity of her leg weakness.  She denies sensory on my exam however she does have response to painful stimuli.  I will try to avoid narcotics as best possible using IV Toradol for severe pain.  We will continue her outpatient medications and add Lidoderm patch as needed.  The patient had a CT head in May of this year that showed no increased CSF space that would be suspicious for normal pressure hydrocephalus.  For pain/weakness continues I would suggest neurologic or even a psychiatric evaluation.  I have ordered for physical therapy.

## 2021-06-16 NOTE — PROGRESS NOTES
Assessment completed. Pt A&Ox 4. Respirations are even and unlabored on room air. Pt reports pain at this time. Medical VS stable, call light and belongings within reach. POC updated (Pain Control). Pt educated on room and call light, pt verbalized understanding. Communication board updated. Needs met.

## 2021-06-16 NOTE — ED NOTES
Mri screening form completed; pt states she has had MRIs in the past without issues from stimulators implanted

## 2021-06-16 NOTE — ED NOTES
"Refused to try to walk; however, requested the doctor print DC papers and she will call \"her people for the ride.\"  "

## 2021-06-16 NOTE — ED PROVIDER NOTES
"ED Provider Note    CHIEF COMPLAINT  Chief Complaint   Patient presents with   • GLF       HPI  Kristin Balderrama is a 30 y.o. female who presents to the emergency department by ambulance from home after a fall out of bed.  Patient states \"I was dozing off, I just do not know what happened.\"  States she rolled off the side of the bed, approximately 3 feet to the floor.  Denies head injury or loss of consciousness.  She is complaining of pain from \"my neck to my tailbone.\"  Patient also states \"my left leg is gone, it is not there, and my right leg is dull.\"  Decreased movement.  Pain with any attempted movement or range of motion.  Patient also complains of weakness in the right upper extremity.    Patient has multiple medical problems, also with chronic back pain and stool and urine incontinence.  She has a stimulator implanted.  She believes she has \"transverse myelitis\" but after a couple years of symptoms, MRI recently did not demonstrate this.  \"No MS either.\"  Distant history, 2010, lumbar fusion, Dr. Stringer.  Patient spends most of her time in a wheelchair \"because I fall a lot,\" but states she cannot bear weight and ambulate to the bathroom independently which was her baseline yesterday before this fall.  She states she is unable to stand or walk at this time.    REVIEW OF SYSTEMS  See HPI for further details. All other systems are negative.     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    SOCIAL HISTORY  Social History     Tobacco Use   • Smoking status: Never Smoker   • Smokeless tobacco: Never Used   Vaping Use   • Vaping Use: Never used   Substance and Sexual Activity   • Alcohol use: No     Alcohol/week: 0.0 oz   • Drug use: Not Currently     Frequency: 7.0 times per week     Types: Marijuana   • Sexual activity: Not Currently     Birth control/protection: Implant       SURGICAL HISTORY   has a past surgical history that includes neuro dest facet l/s w/ig sngl (4/21/2015); recovery (1/27/2016); [place] by laparoscopy (8/29/2010); lumbar fusion anterior (8/21/2012); other cardiac surgery (1/2016); tonsillectomy; bowel stimulator insertion (7/13/2016); gastroscopy with balloon dilatation (N/A, 1/4/2017); muscle biopsy (Right, 1/26/2017); other abdominal surgery; laminotomy; and bowel stimulator insertion (3/10/2021).    CURRENT MEDICATIONS  Home Medications     Reviewed by Paco Calles R.N. (Registered Nurse) on 06/16/21 at 1214  Med List Status: Complete   Medication Last Dose Status   albuterol 108 (90 Base) MCG/ACT Aero Soln inhalation aerosol > 1 week Active   aspirin 81 MG EC tablet 6/15/2021 Active   baclofen (LIORESAL) 10 MG Tab 6/15/2021 Active   busPIRone (BUSPAR) 30 MG tablet 6/15/2021 Active   Dulaglutide (TRULICITY) 1.5 MG/0.5ML Solution Pen-injector 6/11/2021 Active   ferrous sulfate 325 (65 Fe) MG tablet 6/15/2021 Active   fluoxetine (PROZAC) 40 MG capsule 6/15/2021 Active   Fluticasone Furoate-Vilanterol (BREO ELLIPTA)  "200-25 MCG/INH AEROSOL POWDER, BREATH ACTIVATED 6/15/2021 Active   fluticasone-salmeterol (ADVAIR) 250-50 MCG/DOSE AEROSOL POWDER, BREATH ACTIVATED NOT TAKING Active   ibuprofen (MOTRIN) 800 MG Tab NOT TAKING Active   ipratropium-albuterol (DUONEB) 0.5-2.5 (3) MG/3ML nebulizer solution > 1 WEEK Active   ivabradine (CORLANOR) 7.5 MG Tab tablet 6/15/2021 Active   levothyroxine (SYNTHROID) 100 MCG Tab 6/15/2021 Active   Melatonin 10 MG Tab 6/15/2021 Active   methocarbamol (ROBAXIN) 750 MG Tab 6/15/2021 Active   methylPREDNISolone (MEDROL DOSEPAK) 4 MG Tablet Therapy Pack 6/15/2021 Active   metoprolol SR (TOPROL XL) 25 MG TABLET SR 24 HR 6/15/2021 Active   Mirabegron ER (MYRBETRIQ) 50 MG TABLET SR 24 HR 6/15/2021 Active   mycophenolate (CELLCEPT) 500 MG tablet 6/15/2021 Active   omeprazole (PRILOSEC) 40 MG delayed-release capsule 6/15/2021 Active   OXcarbazepine (TRILEPTAL) 300 MG Tab 6/15/2021 Active   pregabalin (LYRICA) 300 MG capsule 6/15/2021 Active   topiramate (TOPAMAX) 50 MG tablet 6/15/2021 Active   traZODone (DESYREL) 100 MG Tab 6/15/2021 Active   ziprasidone (GEODON) 40 MG Cap 6/15/2021 Active                ALLERGIES  Allergies   Allergen Reactions   • Depakote [Divalproex Sodium] Unspecified     Muscle spasms/muscle pain and weakness     • Montelukast [Singulair]      Cardiac effusion   • Amitriptyline Unspecified     Headaches     • Aripiprazole [Abilify] Unspecified     Headaches/muscle twitching     • Clindamycin Nausea     Even with food     • Flagyl [Metronidazole Hcl] Unspecified     \"eye problems\"     • Flomax [Tamsulosin Hydrochloride] Swelling   • Levaquin Unspecified     Severe muscle cramps in legs  RXN=unknown   • Metformin Unspecified     Increased lactic acid      • Tamsulosin Swelling     Swelling of legs   • Tape Rash     Tears skin off  coban with Tegaderm tape ok intermittently  RXN=ongoing   • Vancomycin Itching     Pt becomes flushed in face and chest.   RXN=7/10/16   • Wound Dressing " "Adhesive Hives     By pt report   • Ampicillin Rash     Pt reports that she received a rash    • Ciprofloxacin Rash         • Keflex Rash     Pt states she gets a rash with this medication  Tolerates ceftriaxone  Can take with Benadryl   • Levofloxacin Unspecified     Leg muscle cramps   • Metronidazole Rash     \"Vision problems\"   • Penicillins Hives     Can take with Benadryl   • Sulfa Drugs Itching and Myalgia     Muscle pain and weakness   • Valproic Acid Rash     .         PHYSICAL EXAM  VITAL SIGNS: /55   Pulse 68   Temp 36.2 °C (97.2 °F) (Temporal)   Resp 18   Ht 1.651 m (5' 5\")   Wt 114 kg (252 lb 3.3 oz)   SpO2 96%   BMI 41.97 kg/m²   Pulse ox interpretation: I interpret this pulse ox as normal.  Constitutional: Alert in no apparent distress.  Morbidly obese.  HENT: Normocephalic, atraumatic, no cephalohematoma. Bilateral external ears normal. Nose normal. No oral trauma.    Eyes: Pupils are equal and reactive, Conjunctiva normal.   Neck: Diffuse tenderness midline cervical spine, no step-offs.  Cervical collar placed in the emergency department.  Cardiovascular: Regular rate and rhythm, no murmurs. Distal pulses intact.    Thorax & Lungs: Normal breath sounds, No respiratory distress, No wheezing/rales/robchi. No chest tenderness or crepitus.    Abdomen: Soft, non-distended, non-tender, no palpable or pulsatile masses. No peritoneal signs.   Skin: Warm, Dry.  No abrasions or ecchymosis.  Back: Diffuse midline thoracic and lumbar discomfort, appears greatest in the mid thoracic spine.  No step-offs.  No abrasions or hematomas.  Musculoskeletal: Good range of motion in all major joints. No tenderness to palpation or major deformities noted.   Neurologic: Alert and oriented x4.  Speech is clear and cohesive.  Moves upper extremities without difficulty, follows commands although on strength testing, bilateral upper extremities 5 out of 5, although right decrease strength compared to left greatest " with  and flexion at elbow.  Pain limits testing in the lower extremities, she does have limited effort but plantar flexion intact.  Quadricep and hamstring muscles contract although patient is unable to lift legs off bed or flex at knee which she states is secondary to pain.  Psychiatric: Flat, odd affect.      DIAGNOSTIC STUDIES / PROCEDURES    LABS  Results for orders placed or performed during the hospital encounter of 06/16/21   CBC WITH DIFFERENTIAL   Result Value Ref Range    WBC 5.7 4.8 - 10.8 K/uL    RBC 4.40 4.20 - 5.40 M/uL    Hemoglobin 12.9 12.0 - 16.0 g/dL    Hematocrit 39.9 37.0 - 47.0 %    MCV 90.7 81.4 - 97.8 fL    MCH 29.3 27.0 - 33.0 pg    MCHC 32.3 (L) 33.6 - 35.0 g/dL    RDW 47.1 35.9 - 50.0 fL    Platelet Count 168 164 - 446 K/uL    MPV 12.3 9.0 - 12.9 fL    Neutrophils-Polys 61.50 44.00 - 72.00 %    Lymphocytes 26.80 22.00 - 41.00 %    Monocytes 8.40 0.00 - 13.40 %    Eosinophils 2.50 0.00 - 6.90 %    Basophils 0.40 0.00 - 1.80 %    Immature Granulocytes 0.40 0.00 - 0.90 %    Nucleated RBC 0.00 /100 WBC    Neutrophils (Absolute) 3.52 2.00 - 7.15 K/uL    Lymphs (Absolute) 1.53 1.00 - 4.80 K/uL    Monos (Absolute) 0.48 0.00 - 0.85 K/uL    Eos (Absolute) 0.14 0.00 - 0.51 K/uL    Baso (Absolute) 0.02 0.00 - 0.12 K/uL    Immature Granulocytes (abs) 0.02 0.00 - 0.11 K/uL    NRBC (Absolute) 0.00 K/uL   PROTHROMBIN TIME   Result Value Ref Range    PT 14.8 (H) 12.0 - 14.6 sec    INR 1.19 (H) 0.87 - 1.13   APTT   Result Value Ref Range    APTT 29.5 24.7 - 36.0 sec   COMP METABOLIC PANEL   Result Value Ref Range    Sodium 142 135 - 145 mmol/L    Potassium 3.3 (L) 3.6 - 5.5 mmol/L    Chloride 115 (H) 96 - 112 mmol/L    Co2 17 (L) 20 - 33 mmol/L    Anion Gap 10.0 7.0 - 16.0    Glucose 87 65 - 99 mg/dL    Bun 13 8 - 22 mg/dL    Creatinine 0.64 0.50 - 1.40 mg/dL    Calcium 7.3 (L) 8.5 - 10.5 mg/dL    AST(SGOT) 13 12 - 45 U/L    ALT(SGPT) 25 2 - 50 U/L    Alkaline Phosphatase 72 30 - 99 U/L    Total  Bilirubin 0.2 0.1 - 1.5 mg/dL    Albumin 3.4 3.2 - 4.9 g/dL    Total Protein 4.9 (L) 6.0 - 8.2 g/dL    Globulin 1.5 (L) 1.9 - 3.5 g/dL    A-G Ratio 2.3 g/dL   HCG QUAL SERUM   Result Value Ref Range    Beta-Hcg Qualitative Serum Negative Negative   ESTIMATED GFR   Result Value Ref Range    GFR If African American >60 >60 mL/min/1.73 m 2    GFR If Non African American >60 >60 mL/min/1.73 m 2   SARS-CoV-2 PCR (24 hour In-House): Collect NP swab in VTM    Specimen: Respirate   Result Value Ref Range    SARS-CoV-2 Source NP Swab      *Note: Due to a large number of results and/or encounters for the requested time period, some results have not been displayed. A complete set of results can be found in Results Review.     RADIOLOGY  CT-CSPINE WITHOUT PLUS RECONS   Final Result      No acute fracture is identified.      CT-TSPINE W/O PLUS RECONS   Final Result         1.  No acute fracture is identified.      2.  Small bilateral pleural effusions. Adjacent airspace disease is likely atelectasis.      CT-LSPINE W/O PLUS RECONS   Final Result      1.  No acute fracture.      2.  Status post anterior fusion at L4-5.      3.  Right nephrolithiasis      MR-THORACIC SPINE-W/O    (Results Pending)   MR-LUMBAR SPINE-W/O    (Results Pending)       COURSE & MEDICAL DECISION MAKING  Nursing notes and vital signs were reviewed. (See chart for details)  The patients records were reviewed, history was obtained from the patient;     Medical record review: MRIs of the head, orbits, C, T, L-spine without contrast 6/6/2021 were quite unremarkable, Schmorl's node endplate irregularities were noted in the low thoracic spine.  No evidence of demyelinating disease.    ED evaluation for diffuse neck and back pain, lower extremity weakness after mild mechanism fall is unrevealing. CT is negative for fracture, compression deformity or subluxation. However patient continues to have intractable pain and is unable to stand or bear weight.  Therefore  she will be hospitalized for PT evaluation, MRI if symptoms persist given new symptoms in the setting of trauma, although acute injury seems less likely given patient's history.    10:37 AM Dr. Kale Eckert patient agreeable to consultation.      FINAL IMPRESSION  (W19.XXXA) Fall, initial encounter  (M54.5,  G89.29) Acute exacerbation of chronic low back pain  (R26.2) Unable to ambulate      Electronically signed by: Junie Miranda D.O., 6/16/2021 4:44 AM      This dictation was created using voice recognition software. The accuracy of the dictation is limited to the abilities of the software. I expect there may be some errors of grammar and possibly content. The nursing notes were reviewed and certain aspects of this information were incorporated into this note.

## 2021-06-16 NOTE — H&P
"Hospital Medicine History & Physical Note    Date of Service  6/16/2021    Primary Care Physician  Torres Brody M.D.    Consultants  none    Code Status  Full Code    Chief Complaint  Chief Complaint   Patient presents with   • GLF       History of Presenting Illness  This is an obese 30 y.o. female with polypharmarcy who presented 6/16/2021 with a reported fall from her bed at ~01:45am and landed on her back. She states she has an elevated bed at least 3' off the ground. She did not lose consciousness.  She states her Aunt made her get back in bed and \"somehow I was able to use my legs and do so.\"  She states that later she had more back pain and weakness of her legs and numbness (left >right).  She has intact sensory to painful stimuli.  She states loss of urine control.  Last BM was one day ago.  She is alert and oriented and doesn't appear to be in distress.  She has a history of lower lumbar fusion many years ago and due to chronic weakness in her legs is having her primary care provider order her an electric wheelchair.  She states using a wheel chair at home.    Review of Systems  Review of Systems   Constitutional: Negative for fever.   HENT: Negative for congestion.    Respiratory: Negative for shortness of breath.    Cardiovascular: Negative for chest pain.   Gastrointestinal: Negative for abdominal pain and nausea.   Genitourinary: Positive for frequency.   Musculoskeletal: Positive for back pain. Negative for myalgias and neck pain.   Neurological: Positive for sensory change. Negative for dizziness and headaches.   Psychiatric/Behavioral: The patient is not nervous/anxious.        Past Medical History   has a past medical history of Abdominal pain, Anginal syndrome, Apnea, sleep, Arrhythmia, Arthritis, ASTHMA, Atrial fibrillation (HCC), Back pain, Borderline personality disorder (HCC), Breath shortness, Bronchitis, Cardiac arrhythmia, Chickenpox, Chronic UTI (9/18/2010), Cough, Daytime sleepiness, " Dental disorder (03/08/2021), Depression, Diabetes ([facility]), Diarrhea, Disorder of thyroid, Fall, Fatigue, Frequent headaches, Gasping for breath, Gynecological disorder, Headache(784.0), Heart burn, Heart murmur, History of falling, Indigestion, Migraine, 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    Surgical History   has a past surgical history that includes neuro dest facet l/s w/ig sngl (4/21/2015); recovery (1/27/2016); delmar by laparoscopy (8/29/2010); lumbar fusion anterior (8/21/2012); other cardiac surgery (1/2016); tonsillectomy; bowel stimulator insertion (7/13/2016); gastroscopy with balloon dilatation (N/A, 1/4/2017); muscle biopsy (Right, 1/26/2017); other abdominal surgery; laminotomy; and bowel stimulator insertion (3/10/2021).     Family History  family history includes Genitourinary () Problems in her sister; Heart Disease in her maternal grandmother and mother; Hypertension in her maternal grandmother, maternal uncle, and mother; No Known Problems in her sister; Other in her mother and sister; Sleep Apnea in her mother.     Social History   reports that she has never smoked. She has never used smokeless tobacco. She reports previous drug use. Frequency: 7.00 times per week. Drug: Marijuana. She reports that she does not drink alcohol.    Allergies  Allergies   Allergen Reactions   • Depakote [Divalproex Sodium] Unspecified     Muscle spasms/muscle pain and weakness     • Montelukast [Singulair]      Cardiac effusion   • Amitriptyline Unspecified     Headaches     • Aripiprazole [Abilify]  "Unspecified     Headaches/muscle twitching     • Clindamycin Nausea     Even with food     • Flagyl [Metronidazole Hcl] Unspecified     \"eye problems\"     • Flomax [Tamsulosin Hydrochloride] Swelling   • Levaquin Unspecified     Severe muscle cramps in legs  RXN=unknown   • Metformin Unspecified     Increased lactic acid      • Tamsulosin Swelling     Swelling of legs   • Tape Rash     Tears skin off  coban with Tegaderm tape ok intermittently  RXN=ongoing   • Vancomycin Itching     Pt becomes flushed in face and chest.   RXN=7/10/16   • Wound Dressing Adhesive Hives     By pt report   • Ampicillin Rash     Pt reports that she received a rash    • Ciprofloxacin Rash         • Keflex Rash     Pt states she gets a rash with this medication  Tolerates ceftriaxone  Can take with Benadryl   • Levofloxacin Unspecified     Leg muscle cramps   • Metronidazole Rash     \"Vision problems\"   • Penicillins Hives     Can take with Benadryl   • Sulfa Drugs Itching and Myalgia     Muscle pain and weakness   • Valproic Acid Rash     .       Medications  Prior to Admission Medications   Prescriptions Last Dose Informant Patient Reported? Taking?   Dulaglutide (TRULICITY) 1.5 MG/0.5ML Solution Pen-injector 6/11/2021 at [facility] Patient No No   Sig: Inject 0.5 mL under the skin every 7 days.   Patient taking differently: Inject 0.5 mL under the skin every Friday.   Fluticasone Furoate-Vilanterol (BREO ELLIPTA) 200-25 MCG/INH AEROSOL POWDER, BREATH ACTIVATED 6/15/2021 at AM Patient Yes No   Sig: Inhale 1 Puff every morning.   Melatonin 10 MG Tab 6/15/2021 at PM Patient Yes No   Sig: Take 10 mg by mouth every bedtime.   Mirabegron ER (MYRBETRIQ) 50 MG TABLET SR 24 HR 6/15/2021 at AM Patient Yes No   Sig: Take 50 mg by mouth every morning.   OXcarbazepine (TRILEPTAL) 300 MG Tab 6/15/2021 at PM Patient No No   Sig: TAKE 1 TABLET BY MOUTH TWICE A DAY   Patient taking differently: Take 300 mg by mouth 2 times a day.   albuterol 108 (90 Base) " MCG/ACT Aero Soln inhalation aerosol > 1 week at PRN Patient No No   Sig: Inhale 2 Puffs every 6 hours as needed for Shortness of Breath.   aspirin 81 MG EC tablet 6/15/2021 at AM Patient Yes No   Sig: Take 81 mg by mouth every morning.   baclofen (LIORESAL) 10 MG Tab 6/15/2021 at PM Patient No No   Sig: Take 1 tablet by mouth 3 times a day.   busPIRone (BUSPAR) 30 MG tablet 6/15/2021 at PM Patient Yes No   Sig: Take 30 mg by mouth 2 times a day.   ferrous sulfate 325 (65 Fe) MG tablet 6/15/2021 at AM Patient No No   Sig: Take 1 tablet by mouth every morning with breakfast.   fluoxetine (PROZAC) 40 MG capsule 6/15/2021 at AM Patient No No   Sig: TAKE 1 CAPSULE BY MOUTH EVERY DAY   fluticasone-salmeterol (ADVAIR) 250-50 MCG/DOSE AEROSOL POWDER, BREATH ACTIVATED NOT TAKING at NOT TAKING Patient No No   Sig: Inhale 1 Puff 2 times a day for 60 days.   Patient not taking: Reported on 6/4/2021   ibuprofen (MOTRIN) 800 MG Tab NOT TAKING at NOT TAKING Patient No No   Sig: Take 1 tablet by mouth every 8 hours as needed for Moderate Pain. Take with food   Patient not taking: Reported on 6/4/2021   ipratropium-albuterol (DUONEB) 0.5-2.5 (3) MG/3ML nebulizer solution > 1 WEEK at PRN Patient No No   Sig: Take 3 mL by nebulization every four hours as needed for Shortness of Breath. Nebulizer   ivabradine (CORLANOR) 7.5 MG Tab tablet 6/15/2021 at PM Patient No No   Sig: Take 1 tablet by mouth 2 times a day, with meals.   levothyroxine (SYNTHROID) 100 MCG Tab 6/15/2021 at AM Patient Yes No   Sig: Take 100 mcg by mouth Every morning on an empty stomach.   methocarbamol (ROBAXIN) 750 MG Tab 6/15/2021 at PM Patient No No   Sig: Take 1 tablet by mouth 4 times a day. As needed for muscle spasm   Patient taking differently: Take 750 mg by mouth at bedtime. As needed for muscle spasm   methylPREDNISolone (MEDROL DOSEPAK) 4 MG Tablet Therapy Pack 6/15/2021 at AM Patient No No   Sig: Follow schedule on package instructions.   Patient taking  differently: Take 4-24 mg by mouth every day. Follow schedule on package instructions.   metoprolol SR (TOPROL XL) 25 MG TABLET SR 24 HR 6/15/2021 at PM Patient No No   Sig: Take 0.5 Tablets by mouth every evening.   Patient taking differently: Take 12.5 mg by mouth every evening. 0.5 tablet = 12.5 mg   mycophenolate (CELLCEPT) 500 MG tablet 6/15/2021 at PM Patient No No   Sig: Take 2 Tablets by mouth 2 times a day.   Patient taking differently: Take 500 mg by mouth 2 times a day.   omeprazole (PRILOSEC) 40 MG delayed-release capsule 6/15/2021 at AM Patient No No   Sig: Take 1 capsule by mouth every day.   pregabalin (LYRICA) 300 MG capsule 6/15/2021 at PM Patient Yes No   Sig: Take 300 mg by mouth 2 times a day.   topiramate (TOPAMAX) 50 MG tablet 6/15/2021 at PM Patient Yes No   Sig: Take 50 mg by mouth 2 times a day.   traZODone (DESYREL) 100 MG Tab 6/15/2021 at PM Patient Yes No   Sig: Take 100 mg by mouth at bedtime.   ziprasidone (GEODON) 40 MG Cap 6/15/2021 at PM Patient No No   Sig: TAKE 1 CAPSULE BY MOUTH TWICE A DAY   Patient taking differently: Take 40 mg by mouth 2 times a day. TAKE 1 CAPSULE BY MOUTH TWICE A DAY      Facility-Administered Medications: None       Physical Exam  Temp:  [36.2 °C (97.2 °F)-36.4 °C (97.5 °F)] 36.2 °C (97.2 °F)  Pulse:  [62-69] 68  Resp:  [16-30] 18  BP: (101-120)/(53-67) 101/55  SpO2:  [93 %-96 %] 96 %    Physical Exam  Constitutional:       Appearance: She is obese. She is not ill-appearing.   HENT:      Head: Normocephalic and atraumatic.      Nose: Nose normal.   Eyes:      Extraocular Movements: Extraocular movements intact.      Conjunctiva/sclera: Conjunctivae normal.   Cardiovascular:      Rate and Rhythm: Normal rate and regular rhythm.      Pulses:           Radial pulses are 2+ on the right side and 2+ on the left side.        Popliteal pulses are 2+ on the right side and 2+ on the left side.      Heart sounds: Normal heart sounds.   Pulmonary:      Effort:  Pulmonary effort is normal. No respiratory distress.      Breath sounds: No wheezing.   Abdominal:      General: Bowel sounds are normal. There is no distension.      Palpations: Abdomen is soft.   Musculoskeletal:         General: No tenderness.      Cervical back: No rigidity or tenderness.      Right lower leg: No edema.      Left lower leg: No edema.   Lymphadenopathy:      Cervical: No cervical adenopathy.   Skin:     General: Skin is warm and dry.   Neurological:      General: No focal deficit present.      Mental Status: She is alert.      Sensory: Sensory deficit present.      Motor: Weakness present.   Psychiatric:         Mood and Affect: Mood normal.         Laboratory:  Recent Labs     06/16/21 0449   WBC 5.7   RBC 4.40   HEMOGLOBIN 12.9   HEMATOCRIT 39.9   MCV 90.7   MCH 29.3   MCHC 32.3*   RDW 47.1   PLATELETCT 168   MPV 12.3     Recent Labs     06/16/21 0449   SODIUM 142   POTASSIUM 3.3*   CHLORIDE 115*   CO2 17*   GLUCOSE 87   BUN 13   CREATININE 0.64   CALCIUM 7.3*     Recent Labs     06/16/21 0449   ALTSGPT 25   ASTSGOT 13   ALKPHOSPHAT 72   TBILIRUBIN 0.2   GLUCOSE 87     Recent Labs     06/16/21 0449   APTT 29.5   INR 1.19*     No results for input(s): NTPROBNP in the last 72 hours.      No results for input(s): TROPONINT in the last 72 hours.    Imaging:  CT-CSPINE WITHOUT PLUS RECONS   Final Result      No acute fracture is identified.      CT-TSPINE W/O PLUS RECONS   Final Result         1.  No acute fracture is identified.      2.  Small bilateral pleural effusions. Adjacent airspace disease is likely atelectasis.      CT-LSPINE W/O PLUS RECONS   Final Result      1.  No acute fracture.      2.  Status post anterior fusion at L4-5.      3.  Right nephrolithiasis      MR-THORACIC SPINE-W/O    (Results Pending)   MR-LUMBAR SPINE-W/O    (Results Pending)         Assessment/Plan:  I anticipate this patient is appropriate for observation status at this time.    * Acute bilateral low back pain  with bilateral sciatica  Assessment & Plan  CT of T and L-spine unremarkable  MRI of lumbar and thoracic spine ordered  Feel there might be a psychological component to this.  Patient I believe does have an acute component of pain likely from bruising and acute trauma from her fall but I do not suspect given the normal CT that she has the severity of her leg weakness.  She denies sensory on my exam however she does have response to painful stimuli.  I will try to avoid narcotics as best possible using IV Toradol for severe pain.  We will continue her outpatient medications and add Lidoderm patch as needed.  The patient had a CT head in May of this year that showed no increased CSF space that would be suspicious for normal pressure hydrocephalus.  For pain/weakness continues I would suggest neurologic or even a psychiatric evaluation.  I have ordered for physical therapy.    Peripheral neuropathy and chronic pain syndrome (CMS-HCC)- (present on admission)  Assessment & Plan  Methocarbamol q hs, trazodone q HS  Lyrica 300mg BID  Baclofen 10mg TID    Polypharmacy- (present on admission)  Assessment & Plan  Needs weaning of meds if able.    Hypothyroidism- (present on admission)  Assessment & Plan  Normal TSH: 3.11 in the last 2 weeks  Continue active treatment with levothyroxine 100 mcg daily    Schizophrenia (HCC)- (present on admission)  Assessment & Plan  Continue outpatient meds and recommend follow up w/ them in next month    Morbidly obese (HCC)- (present on admission)  Assessment & Plan  Body mass index is 41.97 kg/m².

## 2021-06-16 NOTE — DISCHARGE PLANNING
Care Transition Team Assessment    Patient recently D/C'd chart reviewed.    Information Source  Information Given By:  (Chart reviewed.)    Interdisciplinary Discharge Planning  Primary Care Physician: Torres Brody  Lives with - Patient's Self Care Capacity: Other (Comments) (Grandmother )  Patient or legal guardian wants to designate a caregiver: No  Support Systems: Family Member(s)  Housing / Facility: [address]  Able to Return to Previous ADL's: Yes  Mobility Issues: Yes  Prior Services: Skilled Home Health Services  Patient Prefers to be Discharged to:: Home  Assistance Needed: No  Durable Medical Equipment: Walker, Commode, Other - Specify ( a ramp, cane,wc, bedside commode, tub transfer mike)    Discharge Preparedness  What are your discharge supports?: Grandparent  Prior Functional Level: Uses Walker, Uses Wheelchair    Functional Assesment  Prior Functional Level: Uses Walker, Uses Wheelchair    Finances  Prescription Coverage: Yes    Discharge Risks or Barriers  Patient risk factors: Bariatric, Complex medical needs    Anticipated Discharge Information  Discharge Address: [address] [place] [address]  Discharge Contact Phone Number: [phone]

## 2021-06-16 NOTE — ED NOTES
Med Rec complete per pt  Allergies Reviewed  No ABX in the last 14 days     Pt started a MEDROL [place] on (6/09)

## 2021-06-16 NOTE — DISCHARGE PLANNING
Called and spoke with Ruth Ann with [facility] and patient is on service. Will not need a new referral as patient is OBS. Dr Henley updated.

## 2021-06-17 VITALS
RESPIRATION RATE: 18 BRPM | DIASTOLIC BLOOD PRESSURE: 66 MMHG | OXYGEN SATURATION: 92 % | HEART RATE: 68 BPM | WEIGHT: 252.21 LBS | TEMPERATURE: 97.3 F | BODY MASS INDEX: 42.02 KG/M2 | SYSTOLIC BLOOD PRESSURE: 112 MMHG | HEIGHT: 65 IN

## 2021-06-17 PROCEDURE — 700102 HCHG RX REV CODE 250 W/ 637 OVERRIDE(OP): Performed by: HOSPITALIST

## 2021-06-17 PROCEDURE — 700101 HCHG RX REV CODE 250: Performed by: HOSPITALIST

## 2021-06-17 PROCEDURE — 97161 PT EVAL LOW COMPLEX 20 MIN: CPT

## 2021-06-17 PROCEDURE — 99217 PR OBSERVATION CARE DISCHARGE: CPT | Performed by: NURSE PRACTITIONER

## 2021-06-17 PROCEDURE — G0378 HOSPITAL OBSERVATION PER HR: HCPCS

## 2021-06-17 PROCEDURE — 96376 TX/PRO/DX INJ SAME DRUG ADON: CPT

## 2021-06-17 PROCEDURE — 97166 OT EVAL MOD COMPLEX 45 MIN: CPT

## 2021-06-17 PROCEDURE — A9270 NON-COVERED ITEM OR SERVICE: HCPCS | Performed by: NURSE PRACTITIONER

## 2021-06-17 PROCEDURE — A9270 NON-COVERED ITEM OR SERVICE: HCPCS | Performed by: HOSPITALIST

## 2021-06-17 PROCEDURE — 700111 HCHG RX REV CODE 636 W/ 250 OVERRIDE (IP): Performed by: HOSPITALIST

## 2021-06-17 PROCEDURE — 700102 HCHG RX REV CODE 250 W/ 637 OVERRIDE(OP): Performed by: NURSE PRACTITIONER

## 2021-06-17 RX ORDER — OXYCODONE HYDROCHLORIDE 5 MG/1
5 TABLET ORAL ONCE
Status: COMPLETED | OUTPATIENT
Start: 2021-06-17 | End: 2021-06-17

## 2021-06-17 RX ORDER — OXYCODONE HYDROCHLORIDE 5 MG/1
5 TABLET ORAL EVERY 6 HOURS PRN
Qty: 12 TABLET | Refills: 0 | Status: ON HOLD | OUTPATIENT
Start: 2021-06-17 | End: 2021-06-28

## 2021-06-17 RX ADMIN — OMEPRAZOLE 40 MG: 20 CAPSULE, DELAYED RELEASE ORAL at 05:58

## 2021-06-17 RX ADMIN — LIDOCAINE 1 PATCH: 50 PATCH TOPICAL at 11:44

## 2021-06-17 RX ADMIN — OXYCODONE 5 MG: 5 TABLET ORAL at 12:43

## 2021-06-17 RX ADMIN — PREGABALIN 300 MG: 100 CAPSULE ORAL at 05:57

## 2021-06-17 RX ADMIN — OXCARBAZEPINE 300 MG: 300 TABLET, FILM COATED ORAL at 05:58

## 2021-06-17 RX ADMIN — LEVOTHYROXINE SODIUM 100 MCG: 0.05 TABLET ORAL at 05:58

## 2021-06-17 RX ADMIN — OXYCODONE 5 MG: 5 TABLET ORAL at 09:37

## 2021-06-17 RX ADMIN — ENOXAPARIN SODIUM 40 MG: 40 INJECTION SUBCUTANEOUS at 05:56

## 2021-06-17 RX ADMIN — BACLOFEN 10 MG: 10 TABLET ORAL at 11:44

## 2021-06-17 RX ADMIN — FLUOXETINE 40 MG: 20 CAPSULE ORAL at 05:57

## 2021-06-17 RX ADMIN — PREDNISONE 40 MG: 20 TABLET ORAL at 05:58

## 2021-06-17 RX ADMIN — TOPIRAMATE 50 MG: 25 TABLET, FILM COATED ORAL at 05:57

## 2021-06-17 RX ADMIN — BACLOFEN 10 MG: 10 TABLET ORAL at 05:58

## 2021-06-17 RX ADMIN — MYCOPHENOLATE MOFETIL 500 MG: 250 CAPSULE ORAL at 05:58

## 2021-06-17 RX ADMIN — KETOROLAC TROMETHAMINE 30 MG: 30 INJECTION, SOLUTION INTRAMUSCULAR; INTRAVENOUS at 07:29

## 2021-06-17 RX ADMIN — ASPIRIN 81 MG: 81 TABLET, COATED ORAL at 05:58

## 2021-06-17 RX ADMIN — IVABRADINE 7.5 MG: 7.5 TABLET, FILM COATED ORAL at 09:37

## 2021-06-17 RX ADMIN — ZIPRASIDONE HYDROCHLORIDE 40 MG: 40 CAPSULE ORAL at 05:57

## 2021-06-17 RX ADMIN — DOCUSATE SODIUM 50 MG AND SENNOSIDES 8.6 MG 2 TABLET: 8.6; 5 TABLET, FILM COATED ORAL at 05:58

## 2021-06-17 RX ADMIN — BUSPIRONE HYDROCHLORIDE 30 MG: 30 TABLET ORAL at 05:57

## 2021-06-17 ASSESSMENT — COGNITIVE AND FUNCTIONAL STATUS - GENERAL
PERSONAL GROOMING: A LITTLE
MOBILITY SCORE: 8
HELP NEEDED FOR BATHING: TOTAL
TOILETING: TOTAL
DAILY ACTIVITIY SCORE: 12
DRESSING REGULAR LOWER BODY CLOTHING: TOTAL
TURNING FROM BACK TO SIDE WHILE IN FLAT BAD: A LITTLE
MOVING FROM LYING ON BACK TO SITTING ON SIDE OF FLAT BED: UNABLE
WALKING IN HOSPITAL ROOM: TOTAL
SUGGESTED CMS G CODE MODIFIER MOBILITY: CM
SUGGESTED CMS G CODE MODIFIER DAILY ACTIVITY: CL
STANDING UP FROM CHAIR USING ARMS: TOTAL
DRESSING REGULAR UPPER BODY CLOTHING: A LITTLE
MOVING TO AND FROM BED TO CHAIR: UNABLE
EATING MEALS: A LITTLE
CLIMB 3 TO 5 STEPS WITH RAILING: TOTAL

## 2021-06-17 ASSESSMENT — PAIN DESCRIPTION - PAIN TYPE
TYPE: ACUTE PAIN

## 2021-06-17 ASSESSMENT — ACTIVITIES OF DAILY LIVING (ADL): TOILETING: DEPENDENT

## 2021-06-17 ASSESSMENT — GAIT ASSESSMENTS: GAIT LEVEL OF ASSIST: UNABLE TO PARTICIPATE

## 2021-06-17 NOTE — THERAPY
"Occupational Therapy   Initial Evaluation     Patient Name: Kristin Balderrama  Age:  30 y.o., Sex:  female  Medical Record #: [id number]  Today's Date: 6/17/2021     Precautions  Precautions: (P) Fall Risk    Assessment  Patient is 30 y.o. female who presents to acute after falling out of bed resulting in LBP exacerbation. PMH includes polypharmacy, B sciatica, Chronic LBP pain and Schizophrenia.  Pt is well known to this therapist and is dependent for dressing, toileting, and bathing. Pt has a slideboard at home that she is proficient with and reports that she can now walk to the bathroom. Pt is at functional baseline for OT. If pt's grandmother cannot provide current level of assist then pt will require placement. Will remain available for DC needs only.     Plan    Recommend Occupational Therapy DC needs only    DC Equipment Recommendations: (P) None  Discharge Recommendations: (P) Recommend home health for continued occupational therapy services (if grandmother can't provide level of assist then placement)     Subjective    \"My grandmother has to help me a lot\"     Objective       06/17/21 0855   Total Time Spent   Total Time Spent (Mins) 25   Charge Group   OT Evaluation OT Evaluation Mod   Initial Contact Note    Initial Contact Note Order Received and Verified, Occupational Therapy Evaluation in Progress with Full Report to Follow.   Prior Living Situation   Prior Services Skilled Home Health Services   Housing / Facility 1 Story House   Bathroom Set up Bathtub / Shower Combination;Tub Transfer Bench   Equipment Owned Front-Wheel Walker;4-Wheel Walker;Wheelchair;Tub Transfer Bench;Bed Side Commode;Hospital Bed;Ramp;Slide Board   Lives with - Patient's Self Care Capacity Other (Comments)   Comments Pt resides with her grandmother who typically provides all care and assists w/ txfs.    Prior Level of ADL Function   Self Feeding Independent   Grooming / Hygiene Independent   Bathing Dependent   Dressing " Dependent   Toileting Dependent   Precautions   Precautions Fall Risk   Pain 0 - 10 Group   Therapist Pain Assessment Post Activity Pain Same as Prior to Activity;Nurse Notified  (c/o LE pain, not quantified)   Active ROM Upper Body   Active ROM Upper Body  WDL   Strength Upper Body   Upper Body Strength  WDL   Coordination Upper Body   Coordination WDL   Balance Assessment   Sitting Balance (Static) Fair +   Sitting Balance (Dynamic) Fair   Standing Balance (Static) Trace +   Standing Balance (Dynamic) Trace +   Weight Shift Sitting Fair   Weight Shift Standing Absent   Comments w/ B UE support   Bed Mobility    Supine to Sit Minimal Assist   Sit to Supine Moderate Assist   Scooting Supervised   ADL Assessment   Grooming Supervision;Seated   Lower Body Dressing Total Assist  (baseline)   Toileting Total Assist  (baseline)   How much help from another person does the patient currently need...   Putting on and taking off regular lower body clothing? 1   Bathing (including washing, rinsing, and drying)? 1   Toileting, which includes using a toilet, bedpan, or urinal? 1   Putting on and taking off regular upper body clothing? 3   Taking care of personal grooming such as brushing teeth? 3   Eating meals? 3   6 Clicks Daily Activity Score 12   Functional Mobility   Sit to Stand Maximal Assist   Bed, Chair, Wheelchair Transfer Unable to Participate   Mobility supine<>sit<>stand   Activity Tolerance   Sitting in Chair NT   Sitting Edge of Bed 15 min   Standing <1 min   Short Term Goals   Short Term Goal # 1 Pt will have no acute OT needs by time of DC   Education Group   Role of Occupational Therapist Patient Response Patient;Acceptance;Explanation;Demonstration;Verbal Demonstration   Problem List   Problem List Decreased Active Daily Living Skills;Decreased Homemaking Skills   Anticipated Discharge Equipment and Recommendations   DC Equipment Recommendations None   Discharge Recommendations Recommend home health for  continued occupational therapy services  (if grandmother can't provide level of assist then placement)   Interdisciplinary Plan of Care Collaboration   IDT Collaboration with  Nursing   Patient Position at End of Therapy In Bed;Call Light within Reach;Bed Alarm On;Tray Table within Reach;Phone within Reach   Collaboration Comments report given   Session Information   Date / Session Number  6/17, DC needs only

## 2021-06-17 NOTE — CARE PLAN
Problem: Pain - Standard  Goal: Alleviation of pain or a reduction in pain to the patient’s comfort goal  Outcome: Not Progressing     Problem: Knowledge Deficit - Standard  Goal: Patient and family/care givers will demonstrate understanding of plan of care, disease process/condition, diagnostic tests and medications  Outcome: Not Progressing     Problem: Skin Integrity  Goal: Skin integrity is maintained or improved  Outcome: Not Progressing     Problem: Skin Integrity  Goal: Skin integrity is maintained or improved  Outcome: Not Progressing     Problem: Fall Risk  Goal: Patient will remain free from falls  Outcome: Not Progressing   The patient is Watcher - Medium risk of patient condition declining or worsening    Shift Goals  Patient Goals: Pain Control    Progress made toward(s) clinical / shift goals:  Patient updated on POC    Patient is not progressing towards the following goals:      Problem: Pain - Standard  Goal: Alleviation of pain or a reduction in pain to the patient’s comfort goal  Outcome: Not Progressing     Problem: Knowledge Deficit - Standard  Goal: Patient and family/care givers will demonstrate understanding of plan of care, disease process/condition, diagnostic tests and medications  Outcome: Not Progressing     Problem: Skin Integrity  Goal: Skin integrity is maintained or improved  Outcome: Not Progressing     Problem: Fall Risk  Goal: Patient will remain free from falls  Outcome: Not Progressing

## 2021-06-17 NOTE — DISCHARGE INSTRUCTIONS
Discharge Instructions    Discharged to home by taxi with self. Discharged via wheelchair, hospital escort: Yes.  Special equipment needed: Not Applicable    Be sure to schedule a follow-up appointment with your primary care doctor or any specialists as instructed.     Discharge Plan:   Diet Plan: Discussed  Activity Level: Discussed  Confirmed Follow up Appointment: Patient to Call and Schedule Appointment  Confirmed Symptoms Management: Discussed  Medication Reconciliation Updated: Yes    I understand that a diet low in cholesterol, fat, and sodium is recommended for good health. Unless I have been given specific instructions below for another diet, I accept this instruction as my diet prescription.   Other diet: heart healthy diet    Special Instructions: None    · Is patient discharged on Warfarin / Coumadin?   No     Depression / Suicide Risk    As you are discharged from this [facility] facility, it is important to learn how to keep safe from harming yourself.    Recognize the warning signs:  · Abrupt changes in personality, positive or negative- including increase in energy   · Giving away possessions  · Change in eating patterns- significant weight changes-  positive or negative  · Change in sleeping patterns- unable to sleep or sleeping all the time   · Unwillingness or inability to communicate  · Depression  · Unusual sadness, discouragement and loneliness  · Talk of wanting to die  · Neglect of personal appearance   · Rebelliousness- reckless behavior  · Withdrawal from people/activities they love  · Confusion- inability to concentrate     If you or a loved one observes any of these behaviors or has concerns about self-harm, here's what you can do:  · Talk about it- your feelings and reasons for harming yourself  · Remove any means that you might use to hurt yourself (examples: pills, rope, extension cords, firearm)  · Get professional help from the community (Mental Health, Substance Abuse,  psychological counseling)  · Do not be alone:Call your Safe Contact- someone whom you trust who will be there for you.  · Call your local CRISIS HOTLINE 132-7052 or 122-622-1539  · Call your local Children's Mobile Crisis Response Team [place] (141) 504-4737 or www.[employer]  · Call the toll free National Suicide Prevention Hotlines   · National Suicide Prevention Lifeline 127-095-SNWW (4690)  · National Hope Line Network 800-SUICIDE (997-6745)

## 2021-06-17 NOTE — CARE PLAN
Problem: Pain - Standard  Goal: Alleviation of pain or a reduction in pain to the patient’s comfort goal  Outcome: Progressing     Problem: Knowledge Deficit - Standard  Goal: Patient and family/care givers will demonstrate understanding of plan of care, disease process/condition, diagnostic tests and medications  Outcome: Progressing     Problem: Skin Integrity  Goal: Skin integrity is maintained or improved  Outcome: Progressing     Problem: Fall Risk  Goal: Patient will remain free from falls  Outcome: Progressing     The patient is Stable - Low risk of patient condition declining or worsening    Shift Goals  Patient Goals: Pain Control    Progress made toward(s) clinical / shift goals:  pt able to appropriately report pain, managed with current regimen; pt understands POC and safety precautions    Patient is not progressing towards the following goals: n/a

## 2021-06-17 NOTE — DISCHARGE SUMMARY
Discharge Summary    CHIEF COMPLAINT ON ADMISSION  Chief Complaint   Patient presents with   • GLF       Reason for Admission  EMS     Admission Date  6/16/2021    CODE STATUS  Full Code    HPI & HOSPITAL COURSE  This is a 30 y.o. female here with back pain after fall out of her bed. She was admitted to the CDU for further management and pain control. CT of the cervical, thoracic and lumbar spines were unremarkable. Labs unremarkable. She remained hemodynamically stable on room air and afebrile. She was evaluated by PT/OT, deemed to be at baseline. No focal neuro deficits on exam, no saddle anesthesia, therefore has low likelihood of cord compression. She was already [facility] with [facility] for [facility]/OT needs.    Therefore, she is discharged in fair and stable condition to home with organized home healthcare and close outpatient follow-up.    The patient recovered much more quickly than anticipated on admission.    Discharge Date  6/17/2021    FOLLOW UP ITEMS POST DISCHARGE  N/A    DISCHARGE DIAGNOSES  Principal Problem:    Acute bilateral low back pain with bilateral sciatica POA: Unknown  Active Problems:    Peripheral neuropathy and chronic pain syndrome (CMS-HCC) (Chronic) POA: Yes    Morbidly obese (HCC) POA: Yes      Overview: IMO load March 2020    Polypharmacy POA: Yes    Schizophrenia (HCC) POA: Yes    Hypothyroidism (Chronic) POA: Yes  Resolved Problems:    * No resolved hospital problems. *      FOLLOW UP  Future Appointments   Date Time Provider Department Center   6[facility]22[facility]2021  [facility]   7/7/2021 11:30 AM Kayla Wise D.O. [facility] None   8/9/2021  3:50 PM Ignacia Herrera M.D. [facility] None     No follow-up provider specified.    MEDICATIONS ON DISCHARGE     Medication List      START taking these medications      Instructions   oxyCODONE immediate-release 5 MG Tabs  Commonly known as: ROXICODONE   Take 1 tablet by mouth every 6 hours as needed for Severe Pain for  up to 3 days.  Dose: 5 mg        CHANGE how you take these medications      Instructions   methocarbamol 750 MG Tabs  What changed: when to take this  Commonly known as: ROBAXIN   Take 1 tablet by mouth 4 times a day. As needed for muscle spasm  Dose: 750 mg     methylPREDNISolone 4 MG Tbpk  What changed:   · how much to take  · how to take this  · when to take this  Commonly known as: MEDROL DOSEPAK   Follow schedule on package instructions.     metoprolol SR 25 MG Tb24  What changed: additional instructions  Commonly known as: TOPROL XL   Take 0.5 Tablets by mouth every evening.  Dose: 12.5 mg     mycophenolate 500 MG tablet  What changed: how much to take  Commonly known as: CellCept   Doctor's comments: The patient needs to get future refills from her neuro-ophthalmologist, Dr. Yousif  Take 2 Tablets by mouth 2 times a day.  Dose: 1,000 mg     OXcarbazepine 300 MG Tabs  What changed:   · how much to take  · how to take this  · when to take this  · additional instructions  Commonly known as: TRILEPTAL   TAKE 1 TABLET BY MOUTH TWICE A DAY     Trulicity 1.5 MG/0.5ML Sopn  What changed: when to take this  Generic drug: Dulaglutide   Inject 0.5 mL under the skin every 7 days.  Dose: 0.5 mL     ziprasidone 40 MG Caps  What changed:   · how much to take  · how to take this  · when to take this  Commonly known as: GEODON   TAKE 1 CAPSULE BY MOUTH TWICE A DAY        CONTINUE taking these medications      Instructions   albuterol 108 (90 Base) MCG/ACT Aers inhalation aerosol   Inhale 2 Puffs every 6 hours as needed for Shortness of Breath.  Dose: 2 Puff     aspirin 81 MG EC tablet   Take 81 mg by mouth every morning.  Dose: 81 mg     baclofen 10 MG Tabs  Commonly known as: LIORESAL   Take 1 tablet by mouth 3 times a day.  Dose: 10 mg     Breo Ellipta 200-25 MCG/INH Aepb  Generic drug: Fluticasone Furoate-Vilanterol   Inhale 1 Puff every morning.  Dose: 1 Puff     busPIRone 30 MG tablet  Commonly known as: BUSPAR   Take  30 mg by mouth 2 times a day.  Dose: 30 mg     Corlanor 7.5 MG Tabs tablet  Generic drug: ivabradine   Take 1 tablet by mouth 2 times a day, with meals.  Dose: 7.5 mg     ferrous sulfate 325 (65 Fe) MG tablet   Take 1 tablet by mouth every morning with breakfast.  Dose: 325 mg     fluoxetine 40 MG capsule  Commonly known as: PROZAC   TAKE 1 CAPSULE BY MOUTH EVERY DAY  Dose: 40 mg     fluticasone-salmeterol 250-50 MCG/DOSE Aepb  Commonly known as: ADVAIR   Inhale 1 Puff 2 times a day for 60 days.  Dose: 1 Puff     ibuprofen 800 MG Tabs  Commonly known as: MOTRIN   Take 1 tablet by mouth every 8 hours as needed for Moderate Pain. Take with food  Dose: 800 mg     ipratropium-albuterol 0.5-2.5 (3) MG/3ML nebulizer solution  Commonly known as: DUONEB   Take 3 mL by nebulization every four hours as needed for Shortness of Breath. Nebulizer  Dose: 3 mL     levothyroxine 100 MCG Tabs  Commonly known as: SYNTHROID   Take 100 mcg by mouth Every morning on an empty stomach.  Dose: 100 mcg     Melatonin 10 MG Tabs   Take 10 mg by mouth every bedtime.  Dose: 10 mg     Myrbetriq 50 MG Tb24  Generic drug: Mirabegron ER   Take 50 mg by mouth every morning.  Dose: 50 mg     omeprazole 40 MG delayed-release capsule  Commonly known as: PRILOSEC   Take 1 capsule by mouth every day.  Dose: 40 mg     pregabalin 300 MG capsule  Commonly known as: LYRICA   Take 300 mg by mouth 2 times a day.  Dose: 300 mg     topiramate 50 MG tablet  Commonly known as: TOPAMAX   Take 50 mg by mouth 2 times a day.  Dose: 50 mg     traZODone 100 MG Tabs  Commonly known as: DESYREL   Take 100 mg by mouth at bedtime.  Dose: 100 mg            Allergies  Allergies   Allergen Reactions   • Depakote [Divalproex Sodium] Unspecified     Muscle spasms/muscle pain and weakness     • Montelukast [Singulair]      Cardiac effusion   • Amitriptyline Unspecified     Headaches     • Aripiprazole [Abilify] Unspecified     Headaches/muscle twitching     • Clindamycin Nausea  "    Even with food     • Flagyl [Metronidazole Hcl] Unspecified     \"eye problems\"     • Flomax [Tamsulosin Hydrochloride] Swelling   • Levaquin Unspecified     Severe muscle cramps in legs  RXN=unknown   • Metformin Unspecified     Increased lactic acid      • Tamsulosin Swelling     Swelling of legs   • Tape Rash     Tears skin off  coban with Tegaderm tape ok intermittently  RXN=ongoing   • Vancomycin Itching     Pt becomes flushed in face and chest.   RXN=7/10/16   • Wound Dressing Adhesive Hives     By pt report   • Ampicillin Rash     Pt reports that she received a rash    • Ciprofloxacin Rash         • Keflex Rash     Pt states she gets a rash with this medication  Tolerates ceftriaxone  Can take with Benadryl   • Levofloxacin Unspecified     Leg muscle cramps   • Metronidazole Rash     \"Vision problems\"   • Penicillins Hives     Can take with Benadryl   • Sulfa Drugs Itching and Myalgia     Muscle pain and weakness   • Valproic Acid Rash     .       DIET  Orders Placed This Encounter   Procedures   • Diet Order Diet: Regular     Standing Status:   Standing     Number of Occurrences:   1     Order Specific Question:   Diet:     Answer:   Regular [1]       ACTIVITY  As tolerated.  Weight bearing as tolerated    CONSULTATIONS  N/A    PROCEDURES  N/A    LABORATORY  Lab Results   Component Value Date    SODIUM 142 06/16/2021    POTASSIUM 3.3 (L) 06/16/2021    CHLORIDE 115 (H) 06/16/2021    CO2 17 (L) 06/16/2021    GLUCOSE 87 06/16/2021    BUN 13 06/16/2021    CREATININE 0.64 06/16/2021    CREATININE 0.75 (L) 07/20/2010    GLOMRATE >59 07/20/2010        Lab Results   Component Value Date    WBC 5.7 06/16/2021    WBC 6.1 07/20/2010    HEMOGLOBIN 12.9 06/16/2021    HEMATOCRIT 39.9 06/16/2021    PLATELETCT 168 06/16/2021          "

## 2021-06-17 NOTE — THERAPY
Physical Therapy   Initial Evaluation     Patient Name: Kristin Balderrama  Age:  30 y.o., Sex:  female  Medical Record #: [id number]  Today's Date: 6/17/2021     Precautions:  Fall Risk    Assessment  Patient is 30 y.o. female with a diagnosis of GLF with acute LBP exacerbation. History of polypharmacy and B sciatica and chronic LBP and schizophrenia. Pt lives in [facility] with grandmother and no DESTINY. PLOF: w/c bound primarily with short household distance gait <50 feet with FWW. Pt receiving HHPT services prior to admission. Presents with LBP and requires BERNARD for bed mobility and MAX x 2 for STS and unable to ambulate. Would benefit from skilled PT to prevent further functional decline. Pt currently below baseline with pain being primary limitation but also functional weakness. Unsafe to return home at this time. Recommend skilled placement. Will progress POC as tolerated.     Plan    Recommend Physical Therapy 2 times per week until therapy goals are met for the following treatments:  Bed Mobility, Gait Training, Neuro Re-Education / Balance, Therapeutic Activities and Therapeutic Exercises    DC Equipment Recommendations: None  Discharge Recommendations: Recommend post-acute placement for additional physical therapy services prior to discharge home       Subjective       Objective  BERNARD bed mobility, STS MAXx2 EOB and unable to full stand upright for <10 seconds, unable to ambulate     06/17/21 0804   Total Time Spent   Total Time Spent (Mins) 15   Charge Group   PT Evaluation PT Evaluation Low   Initial Contact Note    Initial Contact Note Order Received and Verified, Physical Therapy Evaluation in Progress with Full Report to Follow.   Precautions   Precautions Fall Risk   Pain   Non Verbal Scale  Calm   Pain 0 - 10 Group   Therapist Pain Assessment Post Activity Pain Same as Prior to Activity  (back pain not quantified)   Prior Living Situation   Prior Services Skilled Home Health Services   Housing / Facility 1  \[facility] [place] [facility]\""   Steps Into Home 0  (ramp)   Equipment Owned Wheelchair;Front-Wheel Walker   Lives with - Patient's Self Care Capacity   (Grandmother)   Prior Level of Functional Mobility   Bed Mobility Independent   Transfer Status Independent   Ambulation Independent   Distance Ambulation (Feet)   (short household distances no >50 feet)   Assistive Devices Used Front-Wheel Walker   Wheelchair Independent   Stairs Required Assist   Comments w/c bound most of day   History of Falls   History of Falls Yes   Date of Last Fall   (reason for admission)   Cognition    Comments Agreeable to eval, self limiting   Active ROM Lower Body    Active ROM Lower Body  WDL   Strength Lower Body   Lower Body Strength  WDL   Comments Baseline   Sensation Lower Body   Lower Extremity Sensation   WDL   Comments numb/tingling   Lower Body Muscle Tone   Lower Body Muscle Tone  WDL   Coordination Lower Body    Coordination Lower Body  WDL   Balance Assessment   Sitting Balance (Static) Fair +   Sitting Balance (Dynamic) Fair   Standing Balance (Static) Trace +   Standing Balance (Dynamic) Trace +   Weight Shift Sitting Fair   Weight Shift Standing Absent   Comments HHA UE support   Gait Analysis   Gait Level Of Assist Unable to Participate   Comments unable to safely/effectively stand   Bed Mobility    Supine to Sit Minimal Assist   Sit to Supine Minimal Assist   Scooting Supervised   Rolling Supervised   Functional Mobility   Sit to Stand Maximal Assist  (2 person)   Bed, Chair, Wheelchair Transfer Unable to Participate   Mobility in bed, EOB, STS MAX x 2   How much difficulty does the patient currently have...   Turning over in bed (including adjusting bedclothes, sheets and blankets)? 3   Sitting down on and standing up from a chair with arms (e.g., wheelchair, bedside commode, etc.) 1   Moving from lying on back to sitting on the side of the bed? 1   How much help from another person does the patient currently need...   Moving to and  from a bed to a chair (including a wheelchair)? 1   Need to walk in a hospital room? 1   Climbing 3-5 steps with a railing? 1   6 clicks Mobility Score 8   Activity Tolerance   Sitting Edge of Bed 10   Standing <1   Patient / Family Goals    Patient / Family Goal #1 none stated   Short Term Goals    Short Term Goal # 1 Pt will demo bed mobiltiy flat HOB with SPV in 6 visits to return home safely   Short Term Goal # 2 Pt will demo level surface transfers with FWW and BERNARD to improve functional IND   Short Term Goal # 3 Pt will ambulate 10 feet with FWW and BERNARD in 6 visits to return home safely   Education Group   Education Provided Role of Physical Therapist   Role of Physical Therapist Patient Response Patient;Acceptance;Explanation;Verbal Demonstration   Problem List    Problems Pain;Impaired Bed Mobility;Impaired Transfers;Impaired Ambulation;Impaired Balance;Impaired Coordination   Anticipated Discharge Equipment and Recommendations   DC Equipment Recommendations None   Discharge Recommendations Recommend post-acute placement for additional physical therapy services prior to discharge home   Interdisciplinary Plan of Care Collaboration   IDT Collaboration with  Nursing   Patient Position at End of Therapy In Bed;Bed Alarm On;Call Light within Reach;Tray Table within Reach;Phone within Reach   Collaboration Comments RN Updated   Session Information   Date / Session Number  6/17-1(1/2, 6/23)

## 2021-06-17 NOTE — CARE PLAN
Problem: Pain - Standard  Goal: Alleviation of pain or a reduction in pain to the patient’s comfort goal  Outcome: Not Met     Problem: Knowledge Deficit - Standard  Goal: Patient and family/care givers will demonstrate understanding of plan of care, disease process/condition, diagnostic tests and medications  Outcome: Not Met   The patient is Stable - Low risk of patient condition declining or worsening    Shift Goals  Patient Goals: Pain Control    Progress made toward(s) clinical / shift goals:      Patient is not progressing towards the following goals:      Problem: Pain - Standard  Goal: Alleviation of pain or a reduction in pain to the patient’s comfort goal  Outcome: Not Met     Problem: Knowledge Deficit - Standard  Goal: Patient and family/care givers will demonstrate understanding of plan of care, disease process/condition, diagnostic tests and medications  Outcome: Not Met

## 2021-06-17 NOTE — PROGRESS NOTES
Pt DC'd. IV removed, discharge instructions provided to patient, pt verbalizes understanding. Pt states all questions have been answered. Copy of discharge paperwork provided to pt, signed copy in chart. Hard copy prescription in pt's possession. Pt states all belongings in possession. Awaiting transportation to arrive with home wheelchair

## 2021-06-17 NOTE — PROGRESS NOTES
Assumed pt care at 0700. Received report from Haleigh HASSAN. A&O x4. Pt repots 8/10 lower back pain, medicated per MAR. Respirations even and unlabored on room air. Pt reports decreased feeling in left leg, numbness and tingling in right leg since fall out of bed yesterday. Pt states her legs are twitching this morning and has some more feeling in them compared to yesterday. Updated on POC, communication board updated. Bed locked and in lowest position. Call light and belongings within reach. Non-skid socks in place. Needs met, will continue to monitor.

## 2021-06-17 NOTE — PROGRESS NOTES
4 Eyes Skin Assessment Completed by SONYA Antonio and SONYA Soto.    Head WDL  Ears WDL  Nose WDL  Mouth WDL  Neck WDL  Breast/Chest WDL  Shoulder Blades WDL  Spine WDL  (R) Arm/Elbow/Hand WDL  (L) Arm/Elbow/Hand WDL  Abdomen WDL  Groin WDL  Scrotum/Coccyx/Buttocks WDL  (R) Leg WDL  (L) Leg WDL  (R) Heel/Foot/Toe WDL  (L) Heel/Foot/Toe WDL          Devices In Places SCD's      Interventions In Place N/A    Possible Skin Injury No    Pictures Uploaded Into Epic N/A  Wound Consult Placed N/A  RN Wound Prevention Protocol Ordered No

## 2021-06-20 ENCOUNTER — HOSPITAL ENCOUNTER (INPATIENT)
Facility: MEDICAL CENTER | Age: 31
LOS: 4 days | DRG: 882 | End: 2021-06-28
Attending: EMERGENCY MEDICINE | Admitting: HOSPITALIST
Payer: MEDICARE

## 2021-06-20 ENCOUNTER — APPOINTMENT (OUTPATIENT)
Dept: RADIOLOGY | Facility: MEDICAL CENTER | Age: 31
DRG: 882 | End: 2021-06-20
Attending: EMERGENCY MEDICINE
Payer: MEDICARE

## 2021-06-20 DIAGNOSIS — G89.29 ACUTE EXACERBATION OF CHRONIC LOW BACK PAIN: ICD-10-CM

## 2021-06-20 DIAGNOSIS — M54.42 ACUTE BILATERAL LOW BACK PAIN WITH BILATERAL SCIATICA: ICD-10-CM

## 2021-06-20 DIAGNOSIS — G37.3 TRANSVERSE MYELITIS (HCC): ICD-10-CM
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
## 2021-06-20 DIAGNOSIS — M54.41 ACUTE BILATERAL LOW BACK PAIN WITH BILATERAL SCIATICA: ICD-10-CM

## 2021-06-20 DIAGNOSIS — M54.50 CHRONIC LOW BACK PAIN, UNSPECIFIED BACK PAIN LATERALITY, UNSPECIFIED WHETHER SCIATICA PRESENT: ICD-10-CM

## 2021-06-20 DIAGNOSIS — M54.50 ACUTE EXACERBATION OF CHRONIC LOW BACK PAIN: ICD-10-CM

## 2021-06-20 DIAGNOSIS — G89.29 CHRONIC LOW BACK PAIN, UNSPECIFIED BACK PAIN LATERALITY, UNSPECIFIED WHETHER SCIATICA PRESENT: ICD-10-CM

## 2021-06-20 DIAGNOSIS — R62.7 FAILURE TO THRIVE IN ADULT: ICD-10-CM

## 2021-06-20 LAB
ANION GAP SERPL CALC-SCNC: 11 MMOL/L (ref 7–16)
BASOPHILS # BLD AUTO: 0.3 % (ref 0–1.8)
BASOPHILS # BLD: 0.02 K/UL (ref 0–0.12)
BUN SERPL-MCNC: 14 MG/DL (ref 8–22)
CALCIUM SERPL-MCNC: 8.9 MG/DL (ref 8.5–10.5)
CHLORIDE SERPL-SCNC: 108 MMOL/L (ref 96–112)
CO2 SERPL-SCNC: 22 MMOL/L (ref 20–33)
CREAT SERPL-MCNC: 0.79 MG/DL (ref 0.5–1.4)
EOSINOPHIL # BLD AUTO: 0.13 K/UL (ref 0–0.51)
EOSINOPHIL NFR BLD: 2.1 % (ref 0–6.9)
ERYTHROCYTE [DISTWIDTH] IN BLOOD BY AUTOMATED COUNT: 45.9 FL (ref 35.9–50)
GLUCOSE SERPL-MCNC: 110 MG/DL (ref 65–99)
HCT VFR BLD AUTO: 40.4 % (ref 37–47)
HGB BLD-MCNC: 13 G/DL (ref 12–16)
IMM GRANULOCYTES # BLD AUTO: 0.04 K/UL (ref 0–0.11)
IMM GRANULOCYTES NFR BLD AUTO: 0.6 % (ref 0–0.9)
LYMPHOCYTES # BLD AUTO: 1.71 K/UL (ref 1–4.8)
LYMPHOCYTES NFR BLD: 27.4 % (ref 22–41)
MAGNESIUM SERPL-MCNC: 1.8 MG/DL (ref 1.5–2.5)
MCH RBC QN AUTO: 29.4 PG (ref 27–33)
MCHC RBC AUTO-ENTMCNC: 32.2 G/DL (ref 33.6–35)
MCV RBC AUTO: 91.4 FL (ref 81.4–97.8)
MONOCYTES # BLD AUTO: 0.5 K/UL (ref 0–0.85)
MONOCYTES NFR BLD AUTO: 8 % (ref 0–13.4)
NEUTROPHILS # BLD AUTO: 3.83 K/UL (ref 2–7.15)
NEUTROPHILS NFR BLD: 61.6 % (ref 44–72)
NRBC # BLD AUTO: 0 K/UL
NRBC BLD-RTO: 0 /100 WBC
PLATELET # BLD AUTO: 181 K/UL (ref 164–446)
PMV BLD AUTO: 12.2 FL (ref 9–12.9)
POTASSIUM SERPL-SCNC: 3.3 MMOL/L (ref 3.6–5.5)
RBC # BLD AUTO: 4.42 M/UL (ref 4.2–5.4)
SARS-COV-2 RNA RESP QL NAA+PROBE: NOTDETECTED
SODIUM SERPL-SCNC: 141 MMOL/L (ref 135–145)
SPECIMEN SOURCE: NORMAL
WBC # BLD AUTO: 6.2 K/UL (ref 4.8–10.8)

## 2021-06-20 PROCEDURE — 700102 HCHG RX REV CODE 250 W/ 637 OVERRIDE(OP): Performed by: EMERGENCY MEDICINE

## 2021-06-20 PROCEDURE — 700111 HCHG RX REV CODE 636 W/ 250 OVERRIDE (IP): Performed by: INTERNAL MEDICINE

## 2021-06-20 PROCEDURE — 302146: Performed by: INTERNAL MEDICINE

## 2021-06-20 PROCEDURE — 51798 US URINE CAPACITY MEASURE: CPT

## 2021-06-20 PROCEDURE — A9270 NON-COVERED ITEM OR SERVICE: HCPCS | Performed by: INTERNAL MEDICINE

## 2021-06-20 PROCEDURE — G0378 HOSPITAL OBSERVATION PER HR: HCPCS

## 2021-06-20 PROCEDURE — A9270 NON-COVERED ITEM OR SERVICE: HCPCS | Performed by: EMERGENCY MEDICINE

## 2021-06-20 PROCEDURE — 85025 COMPLETE CBC W/AUTO DIFF WBC: CPT

## 2021-06-20 PROCEDURE — 99285 EMERGENCY DEPT VISIT HI MDM: CPT

## 2021-06-20 PROCEDURE — 36415 COLL VENOUS BLD VENIPUNCTURE: CPT

## 2021-06-20 PROCEDURE — 700102 HCHG RX REV CODE 250 W/ 637 OVERRIDE(OP): Performed by: INTERNAL MEDICINE

## 2021-06-20 PROCEDURE — 80048 BASIC METABOLIC PNL TOTAL CA: CPT

## 2021-06-20 PROCEDURE — U0003 INFECTIOUS AGENT DETECTION BY NUCLEIC ACID (DNA OR RNA); SEVERE ACUTE RESPIRATORY SYNDROME CORONAVIRUS 2 (SARS-COV-2) (CORONAVIRUS DISEASE [COVID-19]), AMPLIFIED PROBE TECHNIQUE, MAKING USE OF HIGH THROUGHPUT TECHNOLOGIES AS DESCRIBED BY CMS-2020-01-R: HCPCS

## 2021-06-20 PROCEDURE — 83735 ASSAY OF MAGNESIUM: CPT

## 2021-06-20 PROCEDURE — 96372 THER/PROPH/DIAG INJ SC/IM: CPT

## 2021-06-20 PROCEDURE — 700101 HCHG RX REV CODE 250: Performed by: INTERNAL MEDICINE

## 2021-06-20 PROCEDURE — U0005 INFEC AGEN DETEC AMPLI PROBE: HCPCS

## 2021-06-20 PROCEDURE — 99220 PR INITIAL OBSERVATION CARE,LEVL III: CPT | Mod: AI | Performed by: INTERNAL MEDICINE

## 2021-06-20 PROCEDURE — 72131 CT LUMBAR SPINE W/O DYE: CPT

## 2021-06-20 RX ORDER — LIDOCAINE 50 MG/G
1 PATCH TOPICAL EVERY 24 HOURS
Status: DISCONTINUED | OUTPATIENT
Start: 2021-06-20 | End: 2021-06-28 | Stop reason: HOSPADM

## 2021-06-20 RX ORDER — ENALAPRILAT 1.25 MG/ML
1.25 INJECTION INTRAVENOUS EVERY 6 HOURS PRN
Status: DISCONTINUED | OUTPATIENT
Start: 2021-06-20 | End: 2021-06-28 | Stop reason: HOSPADM

## 2021-06-20 RX ORDER — OXYCODONE HYDROCHLORIDE 5 MG/1
5 TABLET ORAL ONCE
Status: COMPLETED | OUTPATIENT
Start: 2021-06-20 | End: 2021-06-20

## 2021-06-20 RX ORDER — FERROUS SULFATE 325(65) MG
325 TABLET ORAL
Status: DISCONTINUED | OUTPATIENT
Start: 2021-06-20 | End: 2021-06-28 | Stop reason: HOSPADM

## 2021-06-20 RX ORDER — OXCARBAZEPINE 300 MG/1
300 TABLET, FILM COATED ORAL 2 TIMES DAILY
Status: DISCONTINUED | OUTPATIENT
Start: 2021-06-20 | End: 2021-06-28 | Stop reason: HOSPADM

## 2021-06-20 RX ORDER — BISACODYL 10 MG
10 SUPPOSITORY, RECTAL RECTAL
Status: DISCONTINUED | OUTPATIENT
Start: 2021-06-20 | End: 2021-06-28 | Stop reason: HOSPADM

## 2021-06-20 RX ORDER — ONDANSETRON 4 MG/1
4 TABLET, ORALLY DISINTEGRATING ORAL EVERY 4 HOURS PRN
Status: DISCONTINUED | OUTPATIENT
Start: 2021-06-20 | End: 2021-06-28 | Stop reason: HOSPADM

## 2021-06-20 RX ORDER — BACLOFEN 10 MG/1
10 TABLET ORAL 3 TIMES DAILY
Status: DISCONTINUED | OUTPATIENT
Start: 2021-06-20 | End: 2021-06-28 | Stop reason: HOSPADM

## 2021-06-20 RX ORDER — OXYCODONE HYDROCHLORIDE 5 MG/1
5 TABLET ORAL EVERY 6 HOURS PRN
Status: DISCONTINUED | OUTPATIENT
Start: 2021-06-20 | End: 2021-06-20

## 2021-06-20 RX ORDER — ONDANSETRON 2 MG/ML
4 INJECTION INTRAMUSCULAR; INTRAVENOUS EVERY 4 HOURS PRN
Status: DISCONTINUED | OUTPATIENT
Start: 2021-06-20 | End: 2021-06-28 | Stop reason: HOSPADM

## 2021-06-20 RX ORDER — PROCHLORPERAZINE EDISYLATE 5 MG/ML
5-10 INJECTION INTRAMUSCULAR; INTRAVENOUS EVERY 4 HOURS PRN
Status: DISCONTINUED | OUTPATIENT
Start: 2021-06-20 | End: 2021-06-28 | Stop reason: HOSPADM

## 2021-06-20 RX ORDER — ALBUTEROL SULFATE 90 UG/1
2 AEROSOL, METERED RESPIRATORY (INHALATION) EVERY 6 HOURS PRN
Status: DISCONTINUED | OUTPATIENT
Start: 2021-06-20 | End: 2021-06-28 | Stop reason: HOSPADM

## 2021-06-20 RX ORDER — BUDESONIDE AND FORMOTEROL FUMARATE DIHYDRATE 160; 4.5 UG/1; UG/1
2 AEROSOL RESPIRATORY (INHALATION)
Status: DISCONTINUED | OUTPATIENT
Start: 2021-06-20 | End: 2021-06-28 | Stop reason: HOSPADM

## 2021-06-20 RX ORDER — LEVOTHYROXINE SODIUM 0.1 MG/1
100 TABLET ORAL
Status: DISCONTINUED | OUTPATIENT
Start: 2021-06-20 | End: 2021-06-28 | Stop reason: HOSPADM

## 2021-06-20 RX ORDER — OXYCODONE HYDROCHLORIDE 5 MG/1
5 TABLET ORAL EVERY 8 HOURS PRN
Status: DISCONTINUED | OUTPATIENT
Start: 2021-06-20 | End: 2021-06-21

## 2021-06-20 RX ORDER — ZIPRASIDONE HYDROCHLORIDE 40 MG/1
40 CAPSULE ORAL 2 TIMES DAILY
Status: DISCONTINUED | OUTPATIENT
Start: 2021-06-20 | End: 2021-06-28 | Stop reason: HOSPADM

## 2021-06-20 RX ORDER — LABETALOL HYDROCHLORIDE 5 MG/ML
10 INJECTION, SOLUTION INTRAVENOUS EVERY 4 HOURS PRN
Status: DISCONTINUED | OUTPATIENT
Start: 2021-06-20 | End: 2021-06-28 | Stop reason: HOSPADM

## 2021-06-20 RX ORDER — PROMETHAZINE HYDROCHLORIDE 25 MG/1
12.5-25 TABLET ORAL EVERY 4 HOURS PRN
Status: DISCONTINUED | OUTPATIENT
Start: 2021-06-20 | End: 2021-06-28 | Stop reason: HOSPADM

## 2021-06-20 RX ORDER — AMOXICILLIN 250 MG
2 CAPSULE ORAL 2 TIMES DAILY
Status: DISCONTINUED | OUTPATIENT
Start: 2021-06-20 | End: 2021-06-28 | Stop reason: HOSPADM

## 2021-06-20 RX ORDER — BUSPIRONE HYDROCHLORIDE 10 MG/1
30 TABLET ORAL 2 TIMES DAILY
Status: DISCONTINUED | OUTPATIENT
Start: 2021-06-20 | End: 2021-06-28 | Stop reason: HOSPADM

## 2021-06-20 RX ORDER — CHOLECALCIFEROL (VITAMIN D3) 125 MCG
10 CAPSULE ORAL
Status: DISCONTINUED | OUTPATIENT
Start: 2021-06-20 | End: 2021-06-28 | Stop reason: HOSPADM

## 2021-06-20 RX ORDER — CLONIDINE HYDROCHLORIDE 0.1 MG/1
0.1 TABLET ORAL EVERY 6 HOURS PRN
Status: DISCONTINUED | OUTPATIENT
Start: 2021-06-20 | End: 2021-06-28 | Stop reason: HOSPADM

## 2021-06-20 RX ORDER — PROMETHAZINE HYDROCHLORIDE 25 MG/1
12.5-25 SUPPOSITORY RECTAL EVERY 4 HOURS PRN
Status: DISCONTINUED | OUTPATIENT
Start: 2021-06-20 | End: 2021-06-28 | Stop reason: HOSPADM

## 2021-06-20 RX ORDER — OXYBUTYNIN CHLORIDE 5 MG/1
5 TABLET ORAL 2 TIMES DAILY
Status: DISCONTINUED | OUTPATIENT
Start: 2021-06-20 | End: 2021-06-28 | Stop reason: HOSPADM

## 2021-06-20 RX ORDER — MYCOPHENOLATE MOFETIL 250 MG/1
1000 CAPSULE ORAL 2 TIMES DAILY
Status: DISCONTINUED | OUTPATIENT
Start: 2021-06-20 | End: 2021-06-28 | Stop reason: HOSPADM

## 2021-06-20 RX ORDER — TOPIRAMATE 25 MG/1
50 TABLET ORAL 2 TIMES DAILY
Status: DISCONTINUED | OUTPATIENT
Start: 2021-06-20 | End: 2021-06-28 | Stop reason: HOSPADM

## 2021-06-20 RX ORDER — POLYETHYLENE GLYCOL 3350 17 G/17G
1 POWDER, FOR SOLUTION ORAL
Status: DISCONTINUED | OUTPATIENT
Start: 2021-06-20 | End: 2021-06-28 | Stop reason: HOSPADM

## 2021-06-20 RX ORDER — ACETAMINOPHEN 325 MG/1
650 TABLET ORAL EVERY 6 HOURS PRN
Status: DISCONTINUED | OUTPATIENT
Start: 2021-06-20 | End: 2021-06-28 | Stop reason: HOSPADM

## 2021-06-20 RX ORDER — POTASSIUM CHLORIDE 20 MEQ/1
40 TABLET, EXTENDED RELEASE ORAL ONCE
Status: COMPLETED | OUTPATIENT
Start: 2021-06-20 | End: 2021-06-20

## 2021-06-20 RX ORDER — OMEPRAZOLE 20 MG/1
40 CAPSULE, DELAYED RELEASE ORAL DAILY
Refills: 0 | Status: DISCONTINUED | OUTPATIENT
Start: 2021-06-20 | End: 2021-06-28 | Stop reason: HOSPADM

## 2021-06-20 RX ORDER — FLUOXETINE HYDROCHLORIDE 20 MG/1
40 CAPSULE ORAL DAILY
Refills: 0 | Status: DISCONTINUED | OUTPATIENT
Start: 2021-06-20 | End: 2021-06-24

## 2021-06-20 RX ORDER — IBUPROFEN 400 MG/1
400 TABLET ORAL EVERY 6 HOURS PRN
Status: DISCONTINUED | OUTPATIENT
Start: 2021-06-20 | End: 2021-06-28 | Stop reason: HOSPADM

## 2021-06-20 RX ADMIN — OXYBUTYNIN CHLORIDE 5 MG: 5 TABLET ORAL at 17:12

## 2021-06-20 RX ADMIN — Medication 10 MG: at 22:35

## 2021-06-20 RX ADMIN — OXYCODONE 5 MG: 5 TABLET ORAL at 22:34

## 2021-06-20 RX ADMIN — FLUOXETINE 40 MG: 20 CAPSULE ORAL at 14:30

## 2021-06-20 RX ADMIN — BACLOFEN 10 MG: 10 TABLET ORAL at 17:13

## 2021-06-20 RX ADMIN — BACLOFEN 10 MG: 10 TABLET ORAL at 12:44

## 2021-06-20 RX ADMIN — ASPIRIN 81 MG: 81 TABLET, COATED ORAL at 06:00

## 2021-06-20 RX ADMIN — OMEPRAZOLE 40 MG: 20 CAPSULE, DELAYED RELEASE ORAL at 14:30

## 2021-06-20 RX ADMIN — TOPIRAMATE 50 MG: 25 TABLET, FILM COATED ORAL at 17:12

## 2021-06-20 RX ADMIN — DOCUSATE SODIUM 50 MG AND SENNOSIDES 8.6 MG 2 TABLET: 8.6; 5 TABLET, FILM COATED ORAL at 17:13

## 2021-06-20 RX ADMIN — OXYCODONE 5 MG: 5 TABLET ORAL at 02:14

## 2021-06-20 RX ADMIN — OXYCODONE 5 MG: 5 TABLET ORAL at 10:50

## 2021-06-20 RX ADMIN — ZIPRASIDONE HYDROCHLORIDE 40 MG: 40 CAPSULE ORAL at 17:13

## 2021-06-20 RX ADMIN — LEVOTHYROXINE SODIUM 100 MCG: 0.1 TABLET ORAL at 06:00

## 2021-06-20 RX ADMIN — MYCOPHENOLATE MOFETIL 1000 MG: 250 CAPSULE ORAL at 17:15

## 2021-06-20 RX ADMIN — FERROUS SULFATE TAB 325 MG (65 MG ELEMENTAL FE) 325 MG: 325 (65 FE) TAB at 09:15

## 2021-06-20 RX ADMIN — ZIPRASIDONE HYDROCHLORIDE 40 MG: 40 CAPSULE ORAL at 09:16

## 2021-06-20 RX ADMIN — BUSPIRONE HYDROCHLORIDE 30 MG: 30 TABLET ORAL at 17:14

## 2021-06-20 RX ADMIN — LIDOCAINE 1 PATCH: 50 PATCH TOPICAL at 10:51

## 2021-06-20 RX ADMIN — BUSPIRONE HYDROCHLORIDE 30 MG: 30 TABLET ORAL at 06:00

## 2021-06-20 RX ADMIN — IBUPROFEN 400 MG: 400 TABLET, FILM COATED ORAL at 17:12

## 2021-06-20 RX ADMIN — POTASSIUM CHLORIDE 40 MEQ: 1500 TABLET, EXTENDED RELEASE ORAL at 09:15

## 2021-06-20 RX ADMIN — ENOXAPARIN SODIUM 40 MG: 40 INJECTION SUBCUTANEOUS at 06:00

## 2021-06-20 RX ADMIN — BACLOFEN 10 MG: 10 TABLET ORAL at 06:00

## 2021-06-20 RX ADMIN — OXCARBAZEPINE 300 MG: 300 TABLET, FILM COATED ORAL at 17:12

## 2021-06-20 ASSESSMENT — COGNITIVE AND FUNCTIONAL STATUS - GENERAL
EATING MEALS: A LITTLE
MOVING TO AND FROM BED TO CHAIR: A LOT
HELP NEEDED FOR BATHING: A LOT
HELP NEEDED FOR BATHING: TOTAL
DRESSING REGULAR LOWER BODY CLOTHING: A LOT
DAILY ACTIVITIY SCORE: 12
TURNING FROM BACK TO SIDE WHILE IN FLAT BAD: A LOT
TOILETING: A LOT
DRESSING REGULAR LOWER BODY CLOTHING: TOTAL
STANDING UP FROM CHAIR USING ARMS: A LOT
SUGGESTED CMS G CODE MODIFIER MOBILITY: CN
WALKING IN HOSPITAL ROOM: TOTAL
CLIMB 3 TO 5 STEPS WITH RAILING: TOTAL
TURNING FROM BACK TO SIDE WHILE IN FLAT BAD: UNABLE
MOVING FROM LYING ON BACK TO SITTING ON SIDE OF FLAT BED: A LOT
PERSONAL GROOMING: A LOT
SUGGESTED CMS G CODE MODIFIER MOBILITY: CL
WALKING IN HOSPITAL ROOM: TOTAL
MOBILITY SCORE: 6
MOVING TO AND FROM BED TO CHAIR: UNABLE
SUGGESTED CMS G CODE MODIFIER DAILY ACTIVITY: CL
SUGGESTED CMS G CODE MODIFIER DAILY ACTIVITY: CL
DAILY ACTIVITIY SCORE: 10
EATING MEALS: A LOT
MOVING FROM LYING ON BACK TO SITTING ON SIDE OF FLAT BED: UNABLE
CLIMB 3 TO 5 STEPS WITH RAILING: TOTAL
MOBILITY SCORE: 10
STANDING UP FROM CHAIR USING ARMS: TOTAL
PERSONAL GROOMING: A LITTLE
TOILETING: TOTAL
DRESSING REGULAR UPPER BODY CLOTHING: TOTAL
DRESSING REGULAR UPPER BODY CLOTHING: A LOT

## 2021-06-20 ASSESSMENT — LIFESTYLE VARIABLES
EVER HAD A DRINK FIRST THING IN THE MORNING TO STEADY YOUR NERVES TO GET RID OF A HANGOVER: NO
ALCOHOL_USE: NO
EVER FELT BAD OR GUILTY ABOUT YOUR DRINKING: NO
TOTAL SCORE: 0
HOW MANY TIMES IN THE PAST YEAR HAVE YOU HAD 5 OR MORE DRINKS IN A DAY: 0
HAVE PEOPLE ANNOYED YOU BY CRITICIZING YOUR DRINKING: NO
AVERAGE NUMBER OF DAYS PER WEEK YOU HAVE A DRINK CONTAINING ALCOHOL: 0
TOTAL SCORE: 0
HAVE YOU EVER FELT YOU SHOULD CUT DOWN ON YOUR DRINKING: NO
CONSUMPTION TOTAL: NEGATIVE
ON A TYPICAL DAY WHEN YOU DRINK ALCOHOL HOW MANY DRINKS DO YOU HAVE: 0
TOTAL SCORE: 0

## 2021-06-20 ASSESSMENT — PAIN DESCRIPTION - PAIN TYPE
TYPE: ACUTE PAIN

## 2021-06-20 ASSESSMENT — ENCOUNTER SYMPTOMS
WEAKNESS: 1
SHORTNESS OF BREATH: 0
BACK PAIN: 1
FEVER: 0
CHILLS: 0
NAUSEA: 0
FALLS: 1
BLURRED VISION: 0
VOMITING: 0

## 2021-06-20 ASSESSMENT — FIBROSIS 4 INDEX
FIB4 SCORE: 0.48
FIB4 SCORE: 0.48

## 2021-06-20 NOTE — ED NOTES
Med Rec completed: per patient at bedside and historically from 06/16/21/06/17/21 admission.  Preferred Pharmacy:   Allergies: updated.  No ORAL antibiotics in last 14 days    Home Medications:  Medication Sig Comments   • oxyCODONE immediate-release (ROXICODONE) 5 MG Tab Take 1 tablet by mouth every 6 hours as needed for Severe Pain for up to 3 days. NEW started 06/17/21   • methylPREDNISolone (MEDROL DOSEPAK) 4 MG Tablet Therapy Pack Follow schedule on package instructions.  Patient not taking: Was on before previous admission did not restart after discharge on 6/17/21   • traZODone (DESYREL) 100 MG Tab Take 100 mg by mouth at bedtime.    • topiramate (TOPAMAX) 50 MG tablet Take 50 mg by mouth 2 times a day.    • Fluticasone Furoate-Vilanterol (BREO ELLIPTA) 200-25 MCG/INH AEROSOL POWDER, BREATH ACTIVATED Inhale 1 Puff every morning.    • Mirabegron ER (MYRBETRIQ) 50 MG TABLET SR 24 HR Take 50 mg by mouth every morning.    • OXcarbazepine (TRILEPTAL) 300 MG Tab Take 300 mg by mouth 2 times a day.    • ziprasidone (GEODON) 40 MG Cap Take 40 mg by mouth 2 times a day. TAKE 1 CAPSULE BY MOUTH TWICE A DAY    • fluoxetine (PROZAC) 40 MG capsule TAKE 1 CAPSULE BY MOUTH EVERY DAY    • omeprazole (PRILOSEC) 40 MG delayed-release capsule Take 1 capsule by mouth every day.    • methocarbamol (ROBAXIN) 750 MG Tab Take 750 mg by mouth at bedtime. As needed for muscle spasm    • pregabalin (LYRICA) 300 MG capsule Take 300 mg by mouth 2 times a day.    • busPIRone (BUSPAR) 30 MG tablet Take 30 mg by mouth 2 times a day.    • ipratropium-albuterol (DUONEB) 0.5-2.5 (3) MG/3ML nebulizer solution Take 3 mL by nebulization every four hours as needed for Shortness of Breath. Nebulizer    • ferrous sulfate 325 (65 Fe) MG tablet Take 1 tablet by mouth every morning with breakfast.    • metoprolol SR (TOPROL XL) 25 MG TABLET SR 24 HR  Take 12.5 mg by mouth every evening. 0.5 tablet = 12.5 mg)    • baclofen (LIORESAL) 10 MG Tab Take 1  tablet by mouth 3 times a day.    • ivabradine (CORLANOR) 7.5 MG Tab tablet Take 1 tablet by mouth 2 times a day, with meals.    • aspirin 81 MG EC tablet Take 81 mg by mouth every morning.    • Dulaglutide (TRULICITY) 1.5 MG/0.5ML Solution Pen-injector : Inject 0.5 mL under the skin every Friday.    • mycophenolate (CELLCEPT) 500 MG tablet Take 2 Tablets by mouth 2 times a day.    • levothyroxine (SYNTHROID) 100 MCG Tab Take 100 mcg by mouth Every morning on an empty stomach.    • Melatonin 10 MG Tab Take 10 mg by mouth every bedtime.    • albuterol 108 (90 Base) MCG/ACT Aero Soln inhalation aerosol Inhale 2 Puffs every 6 hours as needed for Shortness of Breath.

## 2021-06-20 NOTE — ED TRIAGE NOTES
"Chief Complaint   Patient presents with   • Fall     Per EMS pt slipped and fell this evening transferring from bedside commode to bed. - LOC. - head injury. -thinners. Pt also stated that she fell out of bed on 6/18. Pt is bedbound.   • Low Back Pain     Started after falling on her bottom on 6/19.       BIB EMS to [facility], pt on monitor and in gown. Pt consists of: Above complaint. Pt being assisted by 84 grandmother. Pt does not have any other support at home per EMS. Pt stated she has encephalitis that caused her to be bedbound. Pt A&Ox4 on RA.    Medications given en route: n/a    /58   Pulse (!) 59   Temp 36.6 °C (97.8 °F) (Temporal)   Resp 16   Ht 1.651 m (5' 5\")   Wt 113 kg (250 lb)   SpO2 94%   BMI 41.60 kg/m²   "

## 2021-06-20 NOTE — CARE PLAN
The patient is Stable - Low risk of patient condition declining or worsening    Shift Goals  Clinical Goals: pain control    Progress made toward(s) clinical / shift goals:  Taught pt 0-10 pain scale and  non pharmacological method of pain mgt, encouraged to verbalize when in pain. Administered PRN pain med as needed.     Patient is not progressing towards the following goals:

## 2021-06-20 NOTE — HOSPITAL COURSE
30-year-old female with a history of schizophrenia, borderline personality disorder, morbid obesity, GERD, h/o optic neuritis, seizures, chronic headaches, polypharmacy and multiple readmissions admitted with fall at home during transfer and unable to care for herself. CT spine negative for fracture.  Pt with rapid response 6/24 for left sided hemiplegia, CT head, CTA head and neck unremarkable. She was given 10% of tpa dose she asked if she was receiving the medication, when she was told that she was in she began moving her extremities and shortly there after stopped moving them again. Her tpa was discontinued given concern of psychogenic etiology of symptoms.

## 2021-06-20 NOTE — ASSESSMENT & PLAN NOTE
Follow-up behavioral health consult,   Recurrent ER presentations concerning for failure to thrive in current residential setting.

## 2021-06-20 NOTE — PROGRESS NOTES
2 RN skin check      2 RN skin check complete.   Devices in place PIV and SCDs.  Skin assessed under devices Yes.  Confirmed pressure ulcers found  None.  New potential pressure ulcers noted None. Wound consult placed NA.  Pt sacrum red but blanching  Q2 turn,kept dry and clean, mepilex, waffle mattress overlay, barrier paste and pillows on bony prominences to prevent skin breakdown

## 2021-06-20 NOTE — PROGRESS NOTES
Hospital Medicine Daily Progress Note    Date of Service  6/20/2021    Chief Complaint  30 y.o. female admitted 6/20/2021 with fall    Hospital Course  30-year-old female with a history of schizophrenia, borderline personality disorder, morbid obesity, GERD, h/o optic neuritis, seizures, chronic headaches, polypharmacy and multiple readmissions admitted with fall at home during transfer and unable to care for herself. CT spine negative for fracture.       Interval Problem Update  - admitted early this am  - having back pain where she fell, discussed that we would treat with pain medication today then start weaning off tomorrow 6/21  - Afebrile  - K 3.3    Consultants/Specialty  None    Code Status  Full Code    Disposition  TBD    Review of Systems  Review of Systems   Constitutional: Positive for malaise/fatigue. Negative for chills and fever.   HENT: Negative for hearing loss.    Eyes: Negative for blurred vision.   Respiratory: Negative for shortness of breath.    Cardiovascular: Negative for chest pain.   Gastrointestinal: Negative for nausea and vomiting.   Genitourinary: Negative for hematuria.   Musculoskeletal: Positive for back pain and falls.   Skin: Negative for rash.   Neurological: Positive for weakness.        Physical Exam  Temp:  [36.1 °C (97 °F)-36.6 °C (97.8 °F)] 36.2 °C (97.2 °F)  Pulse:  [56-62] 62  Resp:  [16] 16  BP: (103-113)/(53-70) 107/70  SpO2:  [92 %-96 %] 96 %    Physical Exam  Vitals and nursing note reviewed.   Constitutional:       General: She is not in acute distress.     Appearance: She is obese. She is not toxic-appearing or diaphoretic.   HENT:      Head: Normocephalic and atraumatic.      Nose: Nose normal.      Mouth/Throat:      Mouth: Mucous membranes are moist.   Eyes:      General: No scleral icterus.     Conjunctiva/sclera: Conjunctivae normal.   Cardiovascular:      Rate and Rhythm: Normal rate and regular rhythm.      Heart sounds: No murmur heard.   No friction rub. No  gallop.    Pulmonary:      Effort: Pulmonary effort is normal.      Breath sounds: Normal breath sounds.   Abdominal:      General: Bowel sounds are normal. There is no distension.      Palpations: Abdomen is soft.   Musculoskeletal:      Right lower leg: No edema.      Left lower leg: No edema.   Skin:     Coloration: Skin is not jaundiced.   Neurological:      Mental Status: She is alert and oriented to person, place, and time.   Psychiatric:         Mood and Affect: Mood normal.         Behavior: Behavior normal.         Fluids  No intake or output data in the 24 hours ending 06/20/21 0945    Laboratory      Recent Labs     06/20/21  0128   SODIUM 141   POTASSIUM 3.3*   CHLORIDE 108   CO2 22   GLUCOSE 110*   BUN 14   CREATININE 0.79   CALCIUM 8.9                   Imaging  CT-LSPINE W/O PLUS RECONS   Final Result      1.  No acute fracture is identified      2.  Status post anterior fusion L4-5.      3.  Right nephrolithiasis           Assessment/Plan  Schizoaffective disorder, depressive type (HCC)- (present on admission)  Assessment & Plan  Follow-up behavioral health consult,   Recurrent ER presentations concerning for failure to thrive in current residential setting.    Hypokalemia- (present on admission)  Assessment & Plan  S/p supplementation    Optic neuritis- (present on admission)  Assessment & Plan  Continue home cellcept    Muscular deconditioning  Assessment & Plan  Recently seen by PT/OT who recommended SNF, below baseline    Hypothyroidism- (present on admission)  Assessment & Plan  Continue home synthroid, recent TSH 2 weeks ago normal    Diabetes mellitus type 2 in obese (HCC)- (present on admission)  Assessment & Plan  Holding home trulicity  A1c 5.4 two weeks ago  Diabetic diet    Polypharmacy- (present on admission)  Assessment & Plan  Holding lyrica, BB, ivabradine, trazodone, muscle relaxers  Weaning off oxycodone in next 2-3 days    GERD (gastroesophageal reflux disease)- (present on  admission)  Assessment & Plan  Continue home PPI    Anxiety- (present on admission)  Assessment & Plan  Continue home psych meds: prozac, buspar       VTE prophylaxis: Lovenox

## 2021-06-20 NOTE — ED PROVIDER NOTES
ED Provider Note    CHIEF COMPLAINT  Chief Complaint   Patient presents with   • Fall     Per EMS pt slipped and fell this evening transferring from bedside commode to bed. - LOC. - head injury. -thinners. Pt also stated that she fell out of bed on 6/18. Pt is bedbound.   • Low Back Pain     Started after falling on her bottom on 6/19.       HPI  Kristin Balderrama is a 30 y.o. female who presents to the emergency room and after recurrent fall. Past medical history as documented both low and quite complex. Patient really recently discharged from the hospital. At this point her sentiment is that she believes that she shouldn't have gone home and would've maybe been better placed at skilled nursing/rehab. Reportedly attempting transfer between bedside commode and her bed at which point she fell to the ground. States that she hit her back on a nearby rail. Now having acute on chronic back pain. EMS was called and transfer the patient here. No blood thinners. No other significant acute changes from her baseline since discharge.    REVIEW OF SYSTEMS  See HPI for further details. All other systems are negative.     PAST MEDICAL HISTORY   has a past medical history of Abdominal pain, Anginal syndrome, Apnea, sleep, Arrhythmia, Arthritis, ASTHMA, Atrial fibrillation (HCC), Back pain, Borderline personality disorder (HCC), Breath shortness, Bronchitis, Cardiac arrhythmia, Chickenpox, Chronic UTI (9/18/2010), Cough, Daytime sleepiness, Dental disorder (03/08/2021), Depression, Diabetes (HCC), Diarrhea, Disorder of thyroid, Fall, Fatigue, Frequent headaches, Gasping for breath, Gynecological disorder, Headache(784.0), Heart burn, Heart murmur, History of falling, Indigestion, Migraine, Mitochondrial disease (HCC), 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HCC), Nausea, Obesity, Other fatigue (6/29/2020), Pain (08-15-12), Painful joint, PCOS (polycystic ovarian syndrome), Pneumonia (2012 12/2020), Psychosis ([facility]), Ringing in ears,  Scoliosis, Shortness of breath, Sinus tachycardia (10/31/2013), Sleep apnea, Snoring, Tonsillitis, 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HCC), Tuberculosis, Urinary bladder disorder, Urinary incontinence, Weakness, and Wears glasses.    SOCIAL HISTORY  Social History     Tobacco Use   • Smoking status: Never Smoker   • Smokeless tobacco: Never Used   Vaping Use   • Vaping Use: Never used   Substance and Sexual Activity   • Alcohol use: No     Alcohol/week: 0.0 oz   • Drug use: Not Currently     Frequency: 7.0 times per week     Types: Marijuana   • Sexual activity: Not Currently     Birth control/protection: Implant       SURGICAL HISTORY   has a past surgical history that includes neuro dest facet l/s w/ig sngl (4/21/2015); recovery (1/27/2016); [place] by laparoscopy (8/29/2010); lumbar fusion anterior (8/21/2012); other cardiac surgery (1/2016); tonsillectomy; bowel stimulator insertion (7/13/2016); gastroscopy with balloon dilatation (N/A, 1/4/2017); muscle biopsy (Right, 1/26/2017); other abdominal surgery; laminotomy; and bowel stimulator insertion (3/10/2021).    CURRENT MEDICATIONS  Home Medications     Reviewed by Antonio Scott, PhT (Pharmacy Tech) on 06/20/21 at 0255  Med List Status: Complete   Medication Last Dose Status   albuterol 108 (90 Base) MCG/ACT Aero Soln inhalation aerosol 6/19/2021 Active   aspirin 81 MG EC tablet 6/19/2021 Active   baclofen (LIORESAL) 10 MG Tab 6/19/2021 Active   busPIRone (BUSPAR) 30 MG tablet 6/19/2021 Active   Dulaglutide (TRULICITY) 1.5 MG/0.5ML Solution Pen-injector 6/18/2021 Active   ferrous sulfate 325 (65 Fe) MG tablet 6/19/2021 Active   fluoxetine (PROZAC) 40 MG capsule 6/19/2021 Active   Fluticasone Furoate-Vilanterol (BREO ELLIPTA) 200-25 MCG/INH AEROSOL POWDER, BREATH ACTIVATED 6/19/2021 Active   fluticasone-salmeterol (ADVAIR) 250-50 MCG/DOSE AEROSOL POWDER, BREATH ACTIVATED Not Taking Active   ibuprofen (MOTRIN) 800 MG Tab Not Taking Active   ipratropium-albuterol  (DUONEB) 0.5-2.5 (3) MG/3ML nebulizer solution 6/19/2021 Active   ivabradine (CORLANOR) 7.5 MG Tab tablet 6/19/2021 Active   levothyroxine (SYNTHROID) 100 MCG Tab 6/19/2021 Active   Melatonin 10 MG Tab 6/19/2021 Active   methocarbamol (ROBAXIN) 750 MG Tab 6/19/2021 Active   methylPREDNISolone (MEDROL DOSEPAK) 4 MG Tablet Therapy Pack Not Taking Active   metoprolol SR (TOPROL XL) 25 MG TABLET SR 24 HR 6/19/2021 Active   Mirabegron ER (MYRBETRIQ) 50 MG TABLET SR 24 HR 6/19/2021 Active   mycophenolate (CELLCEPT) 500 MG tablet 6/19/2021 Active   omeprazole (PRILOSEC) 40 MG delayed-release capsule 6/19/2021 Active   OXcarbazepine (TRILEPTAL) 300 MG Tab 6/19/2021 Active   oxyCODONE immediate-release (ROXICODONE) 5 MG Tab 6/20/2021 Active   pregabalin (LYRICA) 300 MG capsule 6/19/2021 Active   topiramate (TOPAMAX) 50 MG tablet 6/19/2021 Active   traZODone (DESYREL) 100 MG Tab 6/19/2021 Active   ziprasidone (GEODON) 40 MG Cap 6/19/2021 Active                ALLERGIES  Allergies   Allergen Reactions   • Depakote [Divalproex Sodium] Unspecified     Muscle spasms/muscle pain and weakness     • Montelukast [Singulair] Unspecified     Cardiac effusion   • Amitriptyline Unspecified     Headaches     • Aripiprazole [Abilify] Unspecified     Headaches/muscle twitching     • Clindamycin Nausea          • Flomax [Tamsulosin Hydrochloride] Swelling   • Metformin Unspecified     Increased lactic acid      • Tamsulosin Swelling     Swelling of legs   • Tape Rash     Tears skin off  coban with Tegaderm tape ok intermittently  RXN=ongoing   • Vancomycin Itching     Pt becomes flushed in face and chest.   RXN=7/10/16   • Wound Dressing Adhesive Hives     By pt report   • Ampicillin Rash     Pt reports that she received a rash    • Ciprofloxacin Rash         • Keflex Rash     Pt states she gets a rash with this medication  Tolerates ceftriaxone  Can take with Benadryl   • Levofloxacin Unspecified     Leg muscle cramps   • Metronidazole Rash  "    \"Vision problems\"   • Penicillins Hives     Can take with Benadryl   • Sulfa Drugs Itching and Myalgia     Muscle pain and weakness   • Valproic Acid Rash     .       PHYSICAL EXAM  VITAL SIGNS: /56   Pulse (!) 58   Temp 36.6 °C (97.8 °F) (Temporal)   Resp 16   Ht 1.651 m (5' 5\")   Wt 113 kg (250 lb)   SpO2 92%   BMI 41.60 kg/m²  @JODI[[id number]::@   Pulse ox interpretation: I interpret this pulse ox as normal.  Constitutional: Alert in no apparent distress. obese  HENT: No signs of trauma, Bilateral external ears normal, Nose normal.   Eyes: Pupils are equal and reactive  Neck: Normal range of motion, No tenderness, Supple  Lymphatic: No lymphadenopathy noted.   Cardiovascular: Regular rate and rhythm, no murmurs.   Thorax & Lungs: Normal breath sounds, No respiratory distress, No wheezing, No chest tenderness.   Abdomen: Bowel sounds normal, Soft, obese  Skin: Warm, Dry, No erythema, No rash.   Back: pt unable to roll  Extremities: Intact distal pulses  Musculoskeletal: Good range of motion in all major joints. No tenderness to palpation or major deformities noted.   Neurologic: Alert , Normal motor function, Normal sensory function, No focal deficits noted.   Psychiatric: Affect is odd, Judgment normal, Mood normal.       DIAGNOSTIC STUDIES / PROCEDURES      LABS  Results for orders placed or performed during the hospital encounter of 06/20/21   BASIC METABOLIC PANEL   Result Value Ref Range    Sodium 141 135 - 145 mmol/L    Potassium 3.3 (L) 3.6 - 5.5 mmol/L    Chloride 108 96 - 112 mmol/L    Co2 22 20 - 33 mmol/L    Glucose 110 (H) 65 - 99 mg/dL    Bun 14 8 - 22 mg/dL    Creatinine 0.79 0.50 - 1.40 mg/dL    Calcium 8.9 8.5 - 10.5 mg/dL    Anion Gap 11.0 7.0 - 16.0   SARS-CoV-2 PCR (24 hour In-House): Collect NP swab in VTM    Specimen: Nasopharyngeal; Respirate   Result Value Ref Range    SARS-CoV-2 Source NP Swab    ESTIMATED GFR   Result Value Ref Range    GFR If  >60 >60 " mL/min/1.73 m 2    GFR If Non African American >60 >60 mL/min/1.73 m 2   MAGNESIUM   Result Value Ref Range    Magnesium 1.8 1.5 - 2.5 mg/dL     *Note: Due to a large number of results and/or encounters for the requested time period, some results have not been displayed. A complete set of results can be found in Results Review.         RADIOLOGY  CT-LSPINE W/O PLUS RECONS   Final Result      1.  No acute fracture is identified      2.  Status post anterior fusion L4-5.      3.  Right nephrolithiasis              COURSE & MEDICAL DECISION MAKING  Pertinent Labs & Imaging studies reviewed. (See chart for details)  30-year-old female presented to the emergency room after another fall. History as above. Well known to the facility. No acute traumatic findings. I do believe the patient would likely be best served at rehab or skilled nursing as she desires given her recurrent falls and current clinical condition and chronic comorbidities. I discussed case with hospitalist at this point was agreeable ongoing inpatient care.        FINAL IMPRESSION  1. Fall, initial encounter    2. Chronic low back pain, unspecified back pain laterality, unspecified whether sciatica present            Electronically signed by: Brian Slater M.D., 6/20/2021 1:08 AM

## 2021-06-20 NOTE — PROGRESS NOTES
2 RN skin check complete with SONYA Zepeda.     Skin assessed under devices YES.  Confirmed pressure ulcers found on NONE    New potential pressure ulcers noted: N/A    Pt sacrum red but blanching. No noted skin breakdown on bony prominences.    Waffle cushion in use, Q2 turns, barrier cream, pillows for comfort and positioning.

## 2021-06-20 NOTE — H&P
Hospital Medicine History & Physical Note    Date of Service  6/20/2021    Primary Care Physician  Torres Brody M.D.    Consultants  Behavioral health and discharge planning    Code Status  Full Code    Chief Complaint  Chief Complaint   Patient presents with   • Fall     Per EMS pt slipped and fell this evening transferring from bedside commode to bed. - LOC. - head injury. -thinners. Pt also stated that she fell out of bed on 6/18. Pt is bedbound.   • Low Back Pain     Started after falling on her bottom on 6/19.       History of Presenting Illness  30 y.o. female with schizophrenia, morbid obesity and polypharmacy who presented 6/20/2021 with another ground-level fall without apparent injury and her 19th ER presentation in 60 days.  Patient states she is unable to ambulate or transfer.  Patient believes her 85-year-old grandmother is unable to manage her in the residential setting.  In the emergency department she is found to have hypokalemia but no apparent injury.  She is referred to the hospitalist for admission.  At bedside the patient is awake alert oriented conversational and hypomanic but otherwise in no acute distress.         Review of Systems  Review of Systems   Unable to perform ROS: Psychiatric disorder       Past Medical History   has a past medical history of Abdominal pain, Anginal syndrome, Apnea, sleep, Arrhythmia, Arthritis, ASTHMA, Atrial fibrillation (HCC), Back pain, Borderline personality disorder (HCC), Breath shortness, Bronchitis, Cardiac arrhythmia, Chickenpox, Chronic UTI (9/18/2010), Cough, Daytime sleepiness, Dental disorder (03/08/2021), Depression, Diabetes (HCC), Diarrhea, Disorder of thyroid, Fall, Fatigue, Frequent headaches, Gasping for breath, Gynecological disorder, Headache(784.0), Heart burn, Heart murmur, History of falling, Indigestion, Migraine, Mitochondrial disease (HCC), 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HCC), Nausea, Obesity, Other fatigue (6/29/2020), Pain  (08-15-12), Painful joint, PCOS (polycystic ovarian syndrome), Pneumonia (2012 12/2020), Psychosis (HCC), Ringing in ears, Scoliosis, Shortness of breath, Sinus tachycardia (10/31/2013), Sleep apnea, Snoring, Tonsillitis, 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HCC), Tuberculosis, Urinary bladder disorder, Urinary incontinence, Weakness, and Wears glasses.    Surgical History   has a past surgical history that includes neuro dest facet l/s w/ig sngl (4/21/2015); recovery (1/27/2016); [place] by laparoscopy (8/29/2010); lumbar fusion anterior (8/21/2012); other cardiac surgery (1/2016); tonsillectomy; bowel stimulator insertion (7/13/2016); gastroscopy with balloon dilatation (N/A, 1/4/2017); muscle biopsy (Right, 1/26/2017); other abdominal surgery; laminotomy; and bowel stimulator insertion (3/10/2021).     Family History  family history includes Genitourinary () Problems in her sister; Heart Disease in her maternal grandmother and mother; Hypertension in her maternal grandmother, maternal uncle, and mother; No Known Problems in her sister; Other in her mother and sister; Sleep Apnea in her mother.     Social History   reports that she has never smoked. She has never used smokeless tobacco. She reports previous drug use. Frequency: 7.00 times per week. Drug: Marijuana. She reports that she does not drink alcohol.    Allergies  Allergies   Allergen Reactions   • Depakote [Divalproex Sodium] Unspecified     Muscle spasms/muscle pain and weakness     • Montelukast [Singulair] Unspecified     Cardiac effusion   • Amitriptyline Unspecified     Headaches     • Aripiprazole [Abilify] Unspecified     Headaches/muscle twitching     • Clindamycin Nausea          • Flomax [Tamsulosin Hydrochloride] Swelling   • Metformin Unspecified     Increased lactic acid      • Tamsulosin Swelling     Swelling of legs   • Tape Rash     Tears skin off  coban with Tegaderm tape ok intermittently  RXN=ongoing   • Vancomycin Itching     Pt becomes flushed in  "face and chest.   RXN=7/10/16   • Wound Dressing Adhesive Hives     By pt report   • Ampicillin Rash     Pt reports that she received a rash    • Ciprofloxacin Rash         • Keflex Rash     Pt states she gets a rash with this medication  Tolerates ceftriaxone  Can take with Benadryl   • Levofloxacin Unspecified     Leg muscle cramps   • Metronidazole Rash     \"Vision problems\"   • Penicillins Hives     Can take with Benadryl   • Sulfa Drugs Itching and Myalgia     Muscle pain and weakness   • Valproic Acid Rash     .       Medications  Prior to Admission Medications   Prescriptions Last Dose Informant Patient Reported? Taking?   Dulaglutide (TRULICITY) 1.5 MG/0.5ML Solution Pen-injector 6/18/2021 at [id number] Patient No No   Sig: Inject 0.5 mL under the skin every 7 days.   Patient taking differently: Inject 0.5 mL under the skin every Friday.   Fluticasone Furoate-Vilanterol (BREO ELLIPTA) 200-25 MCG/INH AEROSOL POWDER, BREATH ACTIVATED 6/19/2021 at 0800 Patient Yes No   Sig: Inhale 1 Puff every morning.   Melatonin 10 MG Tab 6/19/2021 at PM Patient Yes No   Sig: Take 10 mg by mouth every bedtime.   Mirabegron ER (MYRBETRIQ) 50 MG TABLET SR 24 HR 6/19/2021 at 0800 Patient Yes No   Sig: Take 50 mg by mouth every morning.   OXcarbazepine (TRILEPTAL) 300 MG Tab 6/19/2021 at 1800 Patient No No   Sig: TAKE 1 TABLET BY MOUTH TWICE A DAY   Patient taking differently: Take 300 mg by mouth 2 times a day.   albuterol 108 (90 Base) MCG/ACT Aero Soln inhalation aerosol 6/19/2021 at PM Patient No No   Sig: Inhale 2 Puffs every 6 hours as needed for Shortness of Breath.   aspirin 81 MG EC tablet 6/19/2021 at 0800 Patient Yes No   Sig: Take 81 mg by mouth every morning.   baclofen (LIORESAL) 10 MG Tab 6/19/2021 at 1800 Patient No No   Sig: Take 1 tablet by mouth 3 times a day.   busPIRone (BUSPAR) 30 MG tablet 6/19/2021 at 1800 Patient Yes No   Sig: Take 30 mg by mouth 2 times a day.   ferrous sulfate 325 (65 Fe) MG tablet " 6/19/2021 at 0800 Patient No No   Sig: Take 1 tablet by mouth every morning with breakfast.   fluoxetine (PROZAC) 40 MG capsule 6/19/2021 at 0800 Patient No No   Sig: TAKE 1 CAPSULE BY MOUTH EVERY DAY   fluticasone-salmeterol (ADVAIR) 250-50 MCG/DOSE AEROSOL POWDER, BREATH ACTIVATED Not Taking at Not Taking Patient No No   Sig: Inhale 1 Puff 2 times a day for 60 days.   Patient not taking: Reported on 6/4/2021   ibuprofen (MOTRIN) 800 MG Tab Not Taking at Not Taking Patient No No   Sig: Take 1 tablet by mouth every 8 hours as needed for Moderate Pain. Take with food   Patient not taking: Reported on 6/4/2021   ipratropium-albuterol (DUONEB) 0.5-2.5 (3) MG/3ML nebulizer solution 6/19/2021 at PM Patient No No   Sig: Take 3 mL by nebulization every four hours as needed for Shortness of Breath. Nebulizer   ivabradine (CORLANOR) 7.5 MG Tab tablet 6/19/2021 at 0800 Patient No No   Sig: Take 1 tablet by mouth 2 times a day, with meals.   levothyroxine (SYNTHROID) 100 MCG Tab 6/19/2021 at 0800 Patient Yes No   Sig: Take 100 mcg by mouth Every morning on an empty stomach.   methocarbamol (ROBAXIN) 750 MG Tab 6/19/2021 at PM Patient No No   Sig: Take 1 tablet by mouth 4 times a day. As needed for muscle spasm   Patient taking differently: Take 750 mg by mouth at bedtime. As needed for muscle spasm   methylPREDNISolone (MEDROL DOSEPAK) 4 MG Tablet Therapy Pack Not Taking at Not Taking Patient No No   Sig: Follow schedule on package instructions.   Patient not taking: Reported on 6/20/2021   metoprolol SR (TOPROL XL) 25 MG TABLET SR 24 HR 6/19/2021 at 1800 Patient No No   Sig: Take 0.5 Tablets by mouth every evening.   Patient taking differently: Take 12.5 mg by mouth every evening. 0.5 tablet = 12.5 mg   mycophenolate (CELLCEPT) 500 MG tablet 6/19/2021 at 1800 Patient No No   Sig: Take 2 Tablets by mouth 2 times a day.   omeprazole (PRILOSEC) 40 MG delayed-release capsule 6/19/2021 at 0800 Patient No No   Sig: Take 1 capsule  by mouth every day.   oxyCODONE immediate-release (ROXICODONE) 5 MG Tab 6/20/2021 at PRN  No No   Sig: Take 1 tablet by mouth every 6 hours as needed for Severe Pain for up to 3 days.   pregabalin (LYRICA) 300 MG capsule 6/19/2021 at 1800 Patient Yes No   Sig: Take 300 mg by mouth 2 times a day.   topiramate (TOPAMAX) 50 MG tablet 6/19/2021 at 1800 Patient Yes No   Sig: Take 50 mg by mouth 2 times a day.   traZODone (DESYREL) 100 MG Tab 6/19/2021 at PM Patient Yes No   Sig: Take 100 mg by mouth at bedtime.   ziprasidone (GEODON) 40 MG Cap 6/19/2021 at 1800 Patient No No   Sig: TAKE 1 CAPSULE BY MOUTH TWICE A DAY   Patient taking differently: Take 40 mg by mouth 2 times a day. TAKE 1 CAPSULE BY MOUTH TWICE A DAY      Facility-Administered Medications: None       Physical Exam  Temp:  [36.6 °C (97.8 °F)] 36.6 °C (97.8 °F)  Pulse:  [56-61] 58  Resp:  [16] 16  BP: (104-113)/(53-58) 113/56  SpO2:  [92 %-95 %] 92 %    Physical Exam  Vitals and nursing note reviewed.   Constitutional:       General: She is not in acute distress.     Appearance: Normal appearance. She is obese. She is not ill-appearing, toxic-appearing or diaphoretic.   HENT:      Head: Normocephalic and atraumatic.      Nose: Nose normal. No congestion or rhinorrhea.      Mouth/Throat:      Mouth: Mucous membranes are moist.      Pharynx: Oropharynx is clear.   Eyes:      Extraocular Movements: Extraocular movements intact.      Conjunctiva/sclera: Conjunctivae normal.      Pupils: Pupils are equal, round, and reactive to light.   Neck:      Vascular: No carotid bruit.   Cardiovascular:      Rate and Rhythm: Normal rate and regular rhythm.      Pulses: Normal pulses.      Heart sounds: Normal heart sounds. No murmur heard.   No gallop.    Pulmonary:      Effort: No respiratory distress.      Breath sounds: Normal breath sounds. No wheezing or rales.   Abdominal:      General: Abdomen is flat. Bowel sounds are normal. There is no distension.       Palpations: Abdomen is soft. There is no mass.      Tenderness: There is no abdominal tenderness.      Hernia: No hernia is present.   Musculoskeletal:         General: No tenderness or signs of injury.      Cervical back: Normal range of motion and neck supple. No muscular tenderness.   Lymphadenopathy:      Cervical: No cervical adenopathy.   Skin:     Capillary Refill: Capillary refill takes less than 2 seconds.      Coloration: Skin is not jaundiced or pale.      Findings: No bruising.   Neurological:      General: No focal deficit present.      Mental Status: She is alert and oriented to person, place, and time. Mental status is at baseline.      Cranial Nerves: No cranial nerve deficit.      Motor: No weakness.      Coordination: Coordination normal.   Psychiatric:         Mood and Affect: Mood normal.         Thought Content: Thought content normal.         Judgment: Judgment normal.         Laboratory:      Recent Labs     06/20/21  0128   SODIUM 141   POTASSIUM 3.3*   CHLORIDE 108   CO2 22   GLUCOSE 110*   BUN 14   CREATININE 0.79   CALCIUM 8.9     Recent Labs     06/20/21  0128   GLUCOSE 110*         No results for input(s): NTPROBNP in the last 72 hours.      No results for input(s): TROPONINT in the last 72 hours.    Imaging:  CT-LSPINE W/O PLUS RECONS   Final Result      1.  No acute fracture is identified      2.  Status post anterior fusion L4-5.      3.  Right nephrolithiasis            Assessment/Plan:  I anticipate this patient will require at least two midnights for appropriate medical management, necessitating inpatient admission.    Schizoaffective disorder, depressive type (HCC)- (present on admission)  Assessment & Plan  Follow-up behavioral health consult,   Recurrent ER presentations concerning for failure to thrive in current residential setting.    Muscular deconditioning  Assessment & Plan  Physical therapy evaluation    Polypharmacy- (present on admission)  Assessment & Plan  Hold Lyrica  and oxycodone

## 2021-06-21 PROBLEM — R62.7 FAILURE TO THRIVE IN ADULT: Status: ACTIVE | Noted: 2021-06-21

## 2021-06-21 LAB
ANION GAP SERPL CALC-SCNC: 10 MMOL/L (ref 7–16)
BUN SERPL-MCNC: 9 MG/DL (ref 8–22)
CALCIUM SERPL-MCNC: 9.1 MG/DL (ref 8.5–10.5)
CHLORIDE SERPL-SCNC: 110 MMOL/L (ref 96–112)
CO2 SERPL-SCNC: 17 MMOL/L (ref 20–33)
CREAT SERPL-MCNC: 0.66 MG/DL (ref 0.5–1.4)
GLUCOSE SERPL-MCNC: 95 MG/DL (ref 65–99)
POTASSIUM SERPL-SCNC: 3.8 MMOL/L (ref 3.6–5.5)
SODIUM SERPL-SCNC: 137 MMOL/L (ref 135–145)

## 2021-06-21 PROCEDURE — 94640 AIRWAY INHALATION TREATMENT: CPT

## 2021-06-21 PROCEDURE — 99225 PR SUBSEQUENT OBSERVATION CARE,LEVEL II: CPT | Performed by: INTERNAL MEDICINE

## 2021-06-21 PROCEDURE — 700111 HCHG RX REV CODE 636 W/ 250 OVERRIDE (IP): Performed by: INTERNAL MEDICINE

## 2021-06-21 PROCEDURE — 96372 THER/PROPH/DIAG INJ SC/IM: CPT

## 2021-06-21 PROCEDURE — G0378 HOSPITAL OBSERVATION PER HR: HCPCS

## 2021-06-21 PROCEDURE — 97166 OT EVAL MOD COMPLEX 45 MIN: CPT

## 2021-06-21 PROCEDURE — 80048 BASIC METABOLIC PNL TOTAL CA: CPT

## 2021-06-21 PROCEDURE — 700102 HCHG RX REV CODE 250 W/ 637 OVERRIDE(OP): Performed by: INTERNAL MEDICINE

## 2021-06-21 PROCEDURE — 36415 COLL VENOUS BLD VENIPUNCTURE: CPT

## 2021-06-21 PROCEDURE — A9270 NON-COVERED ITEM OR SERVICE: HCPCS | Performed by: INTERNAL MEDICINE

## 2021-06-21 PROCEDURE — 700101 HCHG RX REV CODE 250: Performed by: INTERNAL MEDICINE

## 2021-06-21 PROCEDURE — 97162 PT EVAL MOD COMPLEX 30 MIN: CPT

## 2021-06-21 PROCEDURE — 94760 N-INVAS EAR/PLS OXIMETRY 1: CPT

## 2021-06-21 RX ORDER — NYSTATIN 100000 U/G
OINTMENT TOPICAL 2 TIMES DAILY
Status: DISCONTINUED | OUTPATIENT
Start: 2021-06-21 | End: 2021-06-28 | Stop reason: HOSPADM

## 2021-06-21 RX ORDER — OXYCODONE HYDROCHLORIDE 5 MG/1
5 TABLET ORAL EVERY 12 HOURS PRN
Status: DISCONTINUED | OUTPATIENT
Start: 2021-06-21 | End: 2021-06-22

## 2021-06-21 RX ADMIN — BUSPIRONE HYDROCHLORIDE 30 MG: 30 TABLET ORAL at 04:53

## 2021-06-21 RX ADMIN — OMEPRAZOLE 40 MG: 20 CAPSULE, DELAYED RELEASE ORAL at 04:51

## 2021-06-21 RX ADMIN — DOCUSATE SODIUM 50 MG AND SENNOSIDES 8.6 MG 2 TABLET: 8.6; 5 TABLET, FILM COATED ORAL at 18:29

## 2021-06-21 RX ADMIN — NYSTATIN OINTMENT: 100000 OINTMENT TOPICAL at 18:28

## 2021-06-21 RX ADMIN — LIDOCAINE 1 PATCH: 50 PATCH TOPICAL at 09:31

## 2021-06-21 RX ADMIN — OXYCODONE 5 MG: 5 TABLET ORAL at 09:30

## 2021-06-21 RX ADMIN — FLUOXETINE 40 MG: 20 CAPSULE ORAL at 04:51

## 2021-06-21 RX ADMIN — DOCUSATE SODIUM 50 MG AND SENNOSIDES 8.6 MG 2 TABLET: 8.6; 5 TABLET, FILM COATED ORAL at 04:49

## 2021-06-21 RX ADMIN — TOPIRAMATE 50 MG: 25 TABLET, FILM COATED ORAL at 04:50

## 2021-06-21 RX ADMIN — ZIPRASIDONE HYDROCHLORIDE 40 MG: 40 CAPSULE ORAL at 04:51

## 2021-06-21 RX ADMIN — ENOXAPARIN SODIUM 40 MG: 40 INJECTION SUBCUTANEOUS at 04:52

## 2021-06-21 RX ADMIN — BACLOFEN 10 MG: 10 TABLET ORAL at 18:30

## 2021-06-21 RX ADMIN — ZIPRASIDONE HYDROCHLORIDE 40 MG: 40 CAPSULE ORAL at 18:29

## 2021-06-21 RX ADMIN — OXCARBAZEPINE 300 MG: 300 TABLET, FILM COATED ORAL at 18:29

## 2021-06-21 RX ADMIN — OXCARBAZEPINE 300 MG: 300 TABLET, FILM COATED ORAL at 04:52

## 2021-06-21 RX ADMIN — BUDESONIDE AND FORMOTEROL FUMARATE DIHYDRATE 2 PUFF: 160; 4.5 AEROSOL RESPIRATORY (INHALATION) at 09:24

## 2021-06-21 RX ADMIN — BUDESONIDE AND FORMOTEROL FUMARATE DIHYDRATE 2 PUFF: 160; 4.5 AEROSOL RESPIRATORY (INHALATION) at 19:57

## 2021-06-21 RX ADMIN — OXYBUTYNIN CHLORIDE 5 MG: 5 TABLET ORAL at 18:29

## 2021-06-21 RX ADMIN — BACLOFEN 10 MG: 10 TABLET ORAL at 04:51

## 2021-06-21 RX ADMIN — TOPIRAMATE 50 MG: 25 TABLET, FILM COATED ORAL at 18:31

## 2021-06-21 RX ADMIN — MYCOPHENOLATE MOFETIL 1000 MG: 250 CAPSULE ORAL at 04:49

## 2021-06-21 RX ADMIN — LEVOTHYROXINE SODIUM 100 MCG: 0.1 TABLET ORAL at 04:50

## 2021-06-21 RX ADMIN — OXYBUTYNIN CHLORIDE 5 MG: 5 TABLET ORAL at 04:51

## 2021-06-21 RX ADMIN — MYCOPHENOLATE MOFETIL 1000 MG: 250 CAPSULE ORAL at 18:30

## 2021-06-21 RX ADMIN — BUSPIRONE HYDROCHLORIDE 30 MG: 30 TABLET ORAL at 18:29

## 2021-06-21 RX ADMIN — FERROUS SULFATE TAB 325 MG (65 MG ELEMENTAL FE) 325 MG: 325 (65 FE) TAB at 09:30

## 2021-06-21 RX ADMIN — ASPIRIN 81 MG: 81 TABLET, COATED ORAL at 04:50

## 2021-06-21 RX ADMIN — BACLOFEN 10 MG: 10 TABLET ORAL at 12:47

## 2021-06-21 ASSESSMENT — COGNITIVE AND FUNCTIONAL STATUS - GENERAL
SUGGESTED CMS G CODE MODIFIER DAILY ACTIVITY: CK
SUGGESTED CMS G CODE MODIFIER MOBILITY: CM
CLIMB 3 TO 5 STEPS WITH RAILING: TOTAL
HELP NEEDED FOR BATHING: A LOT
DAILY ACTIVITIY SCORE: 15
PERSONAL GROOMING: A LITTLE
DRESSING REGULAR UPPER BODY CLOTHING: A LITTLE
TOILETING: A LOT
MOBILITY SCORE: 8
MOVING TO AND FROM BED TO CHAIR: UNABLE
EATING MEALS: A LITTLE
TURNING FROM BACK TO SIDE WHILE IN FLAT BAD: UNABLE
STANDING UP FROM CHAIR USING ARMS: A LITTLE
DRESSING REGULAR LOWER BODY CLOTHING: A LOT
WALKING IN HOSPITAL ROOM: TOTAL
MOVING FROM LYING ON BACK TO SITTING ON SIDE OF FLAT BED: UNABLE

## 2021-06-21 ASSESSMENT — ENCOUNTER SYMPTOMS
BACK PAIN: 1
SHORTNESS OF BREATH: 0
NAUSEA: 0
WEAKNESS: 1
FALLS: 1
VOMITING: 0
CHILLS: 0
FEVER: 0
BLURRED VISION: 0

## 2021-06-21 ASSESSMENT — PAIN DESCRIPTION - PAIN TYPE
TYPE: ACUTE PAIN
TYPE: ACUTE PAIN
TYPE: CHRONIC PAIN;ACUTE PAIN
TYPE: ACUTE PAIN

## 2021-06-21 ASSESSMENT — GAIT ASSESSMENTS: GAIT LEVEL OF ASSIST: UNABLE TO PARTICIPATE

## 2021-06-21 ASSESSMENT — ACTIVITIES OF DAILY LIVING (ADL): TOILETING: REQUIRES ASSIST

## 2021-06-21 NOTE — THERAPY
"Physical Therapy   Initial Evaluation     Patient Name: Kristin Balderrama  Age:  30 y.o., Sex:  female  Medical Record #: [id number]  Today's Date: 6/21/2021     Precautions: Fall Risk    Assessment  Patient is a 30 y.o. female who presented to acute following a fall while transferring from bedside commode to bed. PMH includes schizophrenia, borderline personality disorder, GERD, polypharmacy, and diabetes. This is her 19th ER visit in 60 days. She presented to PT with pain, decreased activity tolerance, and functional weakness which are limiting her ability to safely perform mobility at [facility]. She reported that she is currently nonambulatory and requires assist from her grandmother for transfers at home, but her grandmother is no longer able to assist. She reported she own a FWW and 4WW, but recently has only been able to use her manual w/c for mobility. Patient required min A for bed mobility and transfers due to weakness and pain. Given extensive psych history question if limited volition and behavior may be contributing to weakness and decreased function. Patient is not at safe level to return home at this time and anticipate high risk for readmission if she DC home. Will follow.    Plan    Recommend Physical Therapy 3 times per week until therapy goals are met for the following treatments:  Bed Mobility, Community Re-integration, Equipment, Gait Training, Manual Therapy, Neuro Re-Education / Balance, Self Care/Home Evaluation, Therapeutic Activities and Therapeutic Exercises    DC Equipment Recommendations: None (pt has FWW, 4WW, SB and manual w/c at home)  Discharge Recommendations: Recommend post-acute placement for additional physical therapy services prior to discharge home       Subjective    \"I can't get out of bed.\"     Objective       06/21/21 0908   Total Time Spent   Total Time Spent (Mins) 15   Charge Group   PT Evaluation PT Evaluation Mod   Initial Contact Note    Initial Contact Note Order Received " and Verified, Physical Therapy Evaluation in Progress with Full Report to Follow.   Precautions   Precautions Fall Risk   Comments schizophrenia, borderline personality disorder, polypharmacy, seizures   Vitals   O2 (LPM) 0   O2 Delivery Device None - Room Air   Pain 0 - 10 Group   Location Back   Therapist Pain Assessment Post Activity Pain Same as Prior to Activity;Nurse Notified   Prior Living Situation   Prior Services Continuous (24 Hour) Care Giving Family   Housing / Facility 1 Story House   Steps Into Home 0  (ramp)   Steps In Home 0   Equipment Owned Front-Wheel Walker;4-Wheel Walker;Wheelchair   Lives with - Patient's Self Care Capacity Other (Comments)  (grandmother and aunt)   Comments grandmother assisted, but is no longer able to, aunt works a lot   Prior Level of Functional Mobility   Bed Mobility Independent  (uses trapeze)   Transfer Status Required Assist   Ambulation Required Assist  (previously used FWW or 4WW, has only been using w/c recently)   Distance Ambulation (Feet)   (household)   Assistive Devices Used Wheelchair   Wheelchair Independent   Stairs Unable To Determine At This Time   Comments Patient reported she was ambulating in home, unclear when she started using WC for primary mobility. Grandmother assists with transfers   History of Falls   History of Falls Yes   Date of Last Fall 06/20/21   Cognition    Cognition / Consciousness WDL   Level of Consciousness Alert   Comments pleasant, cooperative. somewhat flat affect, may be behavioral   Passive ROM Lower Body   Passive ROM Lower Body WDL   Comments not formally tested, WFL for bed mobility and transfers   Active ROM Lower Body    Active ROM Lower Body  WDL   Comments not formally tested, WFL for bed mobility and transfers   Strength Lower Body   Lower Body Strength  X   Comments grossly 3+/5 BLE, required assist to move supine > seated EOB, limited by pain, deconditioning   Sensation Lower Body   Lower Extremity Sensation   Not  Tested   Lower Body Muscle Tone   Lower Body Muscle Tone  WDL   Neurological Concerns   Neurological Concerns No   Coordination Lower Body    Coordination Lower Body  WDL   Balance Assessment   Sitting Balance (Static) Fair   Sitting Balance (Dynamic) Fair   Standing Balance (Static)   (not observed)   Standing Balance (Dynamic)   (not observed)   Weight Shift Sitting Fair   Weight Shift Standing   (not observed)   Comments no overt LOB sitting EOB. Standing deferred due to safety concerns, patient reported SB transfer to  at home PTA   Gait Analysis   Gait Level Of Assist Unable to Participate   Weight Bearing Status no restrictions   Bed Mobility    Supine to Sit Minimal Assist   Sit to Supine   (NT - left in chair)   Scooting Supervised  (to EOB)   Rolling Minimal Assist to Rt.   Functional Mobility   Sit to Stand Unable to Participate   Bed, Chair, Wheelchair Transfer Minimal Assist  (for slide board placement, safety)   Transfer Method Slide Board   How much difficulty does the patient currently have...   Turning over in bed (including adjusting bedclothes, sheets and blankets)? 1   Sitting down on and standing up from a chair with arms (e.g., wheelchair, bedside commode, etc.) 1   Moving from lying on back to sitting on the side of the bed? 1   How much help from another person does the patient currently need...   Moving to and from a bed to a chair (including a wheelchair)? 3   Need to walk in a hospital room? 1   Climbing 3-5 steps with a railing? 1   6 clicks Mobility Score 8   Activity Tolerance   Sitting in Chair left in chair   Sitting Edge of Bed 5 min   Comments no dizziness or fatigue reported   Edema / Skin Assessment   Edema / Skin  Not Assessed   Patient / Family Goals    Patient / Family Goal #1 to feel better   Short Term Goals    Short Term Goal # 1 Pt will perform bed mobility with bed features and SPV in 3tx to allow for bed mobility at home.   Short Term Goal # 2 Pt will perform slide board  transfers with SPV in 6 tx to allow for transfers at home.   Short Term Goal # 3 Patient will ambulate 50ft with supervision within 6tx in order to access environment   Education Group   Education Provided Role of Physical Therapist   Role of Physical Therapist Patient Response Patient;Acceptance;Explanation;Demonstration;Verbal Demonstration;Action Demonstration   Problem List    Problems Pain;Impaired Bed Mobility;Impaired Transfers;Impaired Ambulation;Functional Strength Deficit;Decreased Activity Tolerance;Other (Comments)  (impaired volition)   Anticipated Discharge Equipment and Recommendations   DC Equipment Recommendations None  (pt has FWW, 4WW, SB and manual w/c at home)   Discharge Recommendations Recommend post-acute placement for additional physical therapy services prior to discharge home   Interdisciplinary Plan of Care Collaboration   IDT Collaboration with  Nursing;Occupational Therapist   Patient Position at End of Therapy Seated;Chair Alarm On;Call Light within Reach;Tray Table within Reach;Phone within Reach   Collaboration Comments discussed with RN   Session Information   Date / Session Number  6/21 - 1(1/3, 6/27)

## 2021-06-21 NOTE — CARE PLAN
The patient is Stable - Low risk of patient condition declining or worsening    Shift Goals  Clinical Goals: pain control    Progress made toward(s) clinical / shift goals:      Taught pt 0-10 pain scale and  non pharmacological method of pain mgt, encouraged to verbalize when in pain. Administered PRN pain med as needed.     Collaborate with transitional Care team and interdisciplinary team to meet discharge needs

## 2021-06-21 NOTE — THERAPY
"Occupational Therapy   Initial Evaluation     Patient Name: Kristin Balderrama  Age:  30 y.o., Sex:  female  Medical Record #: [id number]  Today's Date: 6/21/2021     Precautions  Precautions: Fall Risk  Comments: schizophrenia, borderline personality disorder, polypharmacy, seizures    Assessment  Patient is 30 y.o. female admitted following a GLF whiel transferring from bedside commode. Pt has had multiple admission and ED visits due to frequent falls and inability to care for self or have caregiver assist patient. Pt lives in a [facility] with grandmother who per patient can no longer assist her at the level she was providing. Pt required Gloria for bed mobility, Gloria for transfers, and maxA for lower body ADLs. WIll continue to see patient for skilled therapy while admitted as well as recommend post-acute placement as pt at a high risk of returning as a re-admission if discharged home immediately.    Plan    Recommend Occupational Therapy 3 times per week until therapy goals are met for the following treatments:  Adaptive Equipment, Self Care/Activities of Daily Living, Therapeutic Activities and Therapeutic Exercises.    DC Equipment Recommendations: (P) Unable to determine at this time  Discharge Recommendations: (P) Recommend post-acute placement for additional occupational therapy services prior to discharge home     Subjective    \"Grandma said no more helping\"     Objective       06/21/21 0907   Prior Living Situation   Prior Services Continuous (24 Hour) Care Giving Family   Housing / Facility 1 Story House   Steps Into Home 0  (ramp)   Steps In Home 0   Bathroom Set up Bathtub / Shower Combination;Tub Transfer Bench   Equipment Owned Front-Wheel Walker;4-Wheel Walker;Wheelchair;Tub Transfer Bench   Lives with - Patient's Self Care Capacity Other (Comments)  (grandmother and aunt)   Comments grandmother assists, no longer able to assist   Prior Level of ADL Function   Self Feeding Independent   Grooming / Hygiene " Independent   Bathing Requires Assist   Dressing Requires Assist   Toileting Requires Assist   Prior Level of IADL Function   Medication Management Requires Assist   Laundry Requires Assist   Kitchen Mobility Requires Assist   Finances Requires Assist   Home Management Requires Assist   Shopping Requires Assist   Prior Level Of Mobility Uses Wheel Chair for in Home Mobility   Driving / Transportation Relatives / Others Provide Transportation   Occupation (Pre-Hospital Vocational) Not Employed   History of Falls   History of Falls Yes   Date of Last Fall   (Reason for admit)   Precautions   Precautions Fall Risk   Comments schizophrenia, borderline personality disorder, polypharmacy, seizures   Pain 0 - 10 Group   Therapist Pain Assessment Post Activity Pain Same as Prior to Activity;Nurse Notified   Cognition    Cognition / Consciousness WDL   Level of Consciousness Alert   Comments Cooperative, flat affect, odd demeanor   Active ROM Upper Body   Active ROM Upper Body  WDL   Dominant Hand Right   Strength Upper Body   Upper Body Strength  WDL   Sensation Upper Body   Upper Extremity Sensation  WDL   Upper Body Muscle Tone   Upper Body Muscle Tone  WDL   Neurological Concerns   Neurological Concerns No   Coordination Upper Body   Coordination WDL   Balance Assessment   Sitting Balance (Static) Fair   Sitting Balance (Dynamic) Fair   Standing Balance (Static)   (NT)   Standing Balance (Dynamic)   (NT)   Weight Shift Sitting Fair   Comments Pt reports wheelchair with slideboard baseline   Bed Mobility    Supine to Sit Minimal Assist   Sit to Supine Minimal Assist   Scooting Minimal Assist   Rolling Minimal Assist to Rt.   ADL Assessment   Grooming Minimal Assist;Seated   Upper Body Dressing Minimal Assist   Lower Body Dressing Maximal Assist   Toileting   (NT-refused)   How much help from another person does the patient currently need...   Putting on and taking off regular lower body clothing? 2   Bathing (including  washing, rinsing, and drying)? 2   Toileting, which includes using a toilet, bedpan, or urinal? 2   Putting on and taking off regular upper body clothing? 3   Taking care of personal grooming such as brushing teeth? 3   Eating meals? 3   6 Clicks Daily Activity Score 15   Functional Mobility   Bed, Chair, Wheelchair Transfer Minimal Assist   Transfer Method Slide Board   Mobility bed mobility, slide board to chair, later in morning slideboard back to bed   Activity Tolerance   Sitting in Chair 15   Sitting Edge of Bed 8   Patient / Family Goals   Patient / Family Goal #1 To get stronger   Short Term Goals   Short Term Goal # 1 Pt will complete ADL transfers with supervision   Short Term Goal # 2 Pt will complete LB dressing with supervision AE PRN   Short Term Goal # 3 Pt will complete toileting with supervision   Education Group   Education Provided Role of Occupational Therapist   Role of Occupational Therapist Patient Response Patient;Acceptance;Explanation   Problem List   Problem List Decreased Active Daily Living Skills;Decreased Homemaking Skills;Decreased Functional Mobility;Decreased Activity Tolerance;Impaired Postural Control / Balance   Interdisciplinary Plan of Care Collaboration   IDT Collaboration with  Nursing;Physical Therapist   Patient Position at End of Therapy Edge of Bed;Call Light within Reach;Tray Table within Reach;Phone within Reach  (left in care of RN once back in bed)   Collaboration Comments RN updated

## 2021-06-21 NOTE — ASSESSMENT & PLAN NOTE
Multiple re-admissions, falls at home, limited support (85 year old grandmother who can't take care of patient)  Needs long term care

## 2021-06-21 NOTE — DIETARY
NUTRITION SERVICES: BMI - Pt with BMI >40 (=Body mass index is 41.46 kg/m².), Class III obesity. Weight taken via bed scale and pt is -0.6L fluids per I/O. Weight loss counseling not appropriate in acute care setting. RECOMMEND - If appropriate at DC please refer to outpatient nutrition services for weight management.

## 2021-06-21 NOTE — CARE PLAN
The patient is Stable - Low risk of patient condition declining or worsening    Shift Goals  Clinical Goals: pain control    Progress made toward(s) clinical / shift goals:    Problem: Pain - Standard  Goal: Alleviation of pain or a reduction in pain to the patient’s comfort goal  Outcome: Progressing     Problem: Knowledge Deficit - Standard  Goal: Patient and family/care givers will demonstrate understanding of plan of care, disease process/condition, diagnostic tests and medications  Outcome: Progressing     Problem: Fall Risk  Goal: Patient will remain free from falls  Outcome: Progressing     Problem: Skin Integrity  Goal: Skin integrity is maintained or improved  Outcome: Progressing       Patient is not progressing towards the following goals:

## 2021-06-21 NOTE — PROGRESS NOTES
Hospital Medicine Daily Progress Note    Date of Service  6/21/2021    Chief Complaint  30 y.o. female admitted 6/20/2021 with fall    Hospital Course  30-year-old female with a history of schizophrenia, borderline personality disorder, morbid obesity, GERD, h/o optic neuritis, seizures, chronic headaches, polypharmacy and multiple readmissions admitted with fall at home during transfer and unable to care for herself. CT spine negative for fracture.       Interval Problem Update  - No acute overnight events  - restarted some of her home meds, weaning off oxycodone  - worked with PT/OT: recommending post-acute  - reports the purwick is bothering her vagina/vulva area, feels irritated, started nystatin ointment    Consultants/Specialty  None    Code Status  Full Code    Disposition  Will need long term SNF, [facility], vs group home, would NOT send back home as patient has proven she cannot take care of herself and her 85 year old grandmother also cannot take care of patient.    Review of Systems  Review of Systems   Constitutional: Positive for malaise/fatigue. Negative for chills and fever.   HENT: Negative for hearing loss.    Eyes: Negative for blurred vision.   Respiratory: Negative for shortness of breath.    Cardiovascular: Negative for chest pain.   Gastrointestinal: Negative for nausea and vomiting.   Genitourinary: Negative for hematuria.        Pain with purwick   Musculoskeletal: Positive for back pain and falls.   Skin: Positive for itching. Negative for rash.   Neurological: Positive for weakness.        Physical Exam  Temp:  [35.8 °C (96.5 °F)-36.7 °C (98 °F)] 35.8 °C (96.5 °F)  Pulse:  [60-97] 60  Resp:  [16-17] 16  BP: (100-115)/(60-69) 115/60  SpO2:  [93 %-94 %] 93 %    Physical Exam  Vitals and nursing note reviewed.   Constitutional:       General: She is not in acute distress.     Appearance: She is obese. She is not toxic-appearing or diaphoretic.   HENT:      Head: Normocephalic and atraumatic.       Nose: Nose normal.      Mouth/Throat:      Mouth: Mucous membranes are moist.   Eyes:      General: No scleral icterus.     Conjunctiva/sclera: Conjunctivae normal.   Cardiovascular:      Rate and Rhythm: Normal rate and regular rhythm.      Heart sounds: No murmur heard.   No friction rub. No gallop.    Pulmonary:      Effort: Pulmonary effort is normal.      Breath sounds: Normal breath sounds.   Abdominal:      General: Bowel sounds are normal. There is no distension.      Palpations: Abdomen is soft.   Genitourinary:     Comments: Mild groin and vulva erythema  Musculoskeletal:      Right lower leg: No edema.      Left lower leg: No edema.   Skin:     Coloration: Skin is not jaundiced.   Neurological:      Mental Status: She is alert and oriented to person, place, and time.   Psychiatric:         Mood and Affect: Mood normal.         Behavior: Behavior normal.         Fluids    Intake/Output Summary (Last 24 hours) at 6/21/2021 1455  Last data filed at 6/21/2021 1000  Gross per 24 hour   Intake 480 ml   Output 1000 ml   Net -520 ml       Laboratory  Recent Labs     06/20/21  0128   WBC 6.2   RBC 4.42   HEMOGLOBIN 13.0   HEMATOCRIT 40.4   MCV 91.4   MCH 29.4   MCHC 32.2*   RDW 45.9   PLATELETCT 181   MPV 12.2     Recent Labs     06/20/21  0128 06/21/21  0321   SODIUM 141 137   POTASSIUM 3.3* 3.8   CHLORIDE 108 110   CO2 22 17*   GLUCOSE 110* 95   BUN 14 9   CREATININE 0.79 0.66   CALCIUM 8.9 9.1                   Imaging  CT-LSPINE W/O PLUS RECONS   Final Result      1.  No acute fracture is identified      2.  Status post anterior fusion L4-5.      3.  Right nephrolithiasis           Assessment/Plan  Failure to thrive in adult  Assessment & Plan  Multiple re-admissions, falls at home, limited support (85 year old grandmother who can't take care of patient)  Needs long term care    Seizures (HCC)  Assessment & Plan  Continue home AEDs: topamax, trileptal    Schizoaffective disorder, depressive type (HCC)-  (present on admission)  Assessment & Plan  Follow-up behavioral health consult,   Recurrent ER presentations concerning for failure to thrive in current residential setting.    Hypokalemia- (present on admission)  Assessment & Plan  S/p supplementation    Optic neuritis- (present on admission)  Assessment & Plan  Continue home cellcept    Muscular deconditioning  Assessment & Plan  Recently seen by PT/OT who recommended SNF, below baseline    Hypothyroidism- (present on admission)  Assessment & Plan  Continue home synthroid, recent TSH 2 weeks ago normal    Diabetes mellitus type 2 in obese (HCC)- (present on admission)  Assessment & Plan  Holding home trulicity  A1c 5.4 two weeks ago  Diabetic diet    Polypharmacy- (present on admission)  Assessment & Plan  Holding lyrica, BB, ivabradine, trazodone, muscle relaxers  Weaning off oxycodone in next 2-3 days    GERD (gastroesophageal reflux disease)- (present on admission)  Assessment & Plan  Continue home PPI    Anxiety- (present on admission)  Assessment & Plan  Continue home psych meds: prozac, buspar       VTE prophylaxis: Lovenox

## 2021-06-22 PROCEDURE — 700111 HCHG RX REV CODE 636 W/ 250 OVERRIDE (IP): Performed by: INTERNAL MEDICINE

## 2021-06-22 PROCEDURE — A9270 NON-COVERED ITEM OR SERVICE: HCPCS | Performed by: HOSPITALIST

## 2021-06-22 PROCEDURE — 99225 PR SUBSEQUENT OBSERVATION CARE,LEVEL II: CPT | Performed by: HOSPITALIST

## 2021-06-22 PROCEDURE — 96375 TX/PRO/DX INJ NEW DRUG ADDON: CPT

## 2021-06-22 PROCEDURE — 96374 THER/PROPH/DIAG INJ IV PUSH: CPT

## 2021-06-22 PROCEDURE — 700101 HCHG RX REV CODE 250: Performed by: STUDENT IN AN ORGANIZED HEALTH CARE EDUCATION/TRAINING PROGRAM

## 2021-06-22 PROCEDURE — 700102 HCHG RX REV CODE 250 W/ 637 OVERRIDE(OP): Performed by: HOSPITALIST

## 2021-06-22 PROCEDURE — 700101 HCHG RX REV CODE 250: Performed by: INTERNAL MEDICINE

## 2021-06-22 PROCEDURE — 700102 HCHG RX REV CODE 250 W/ 637 OVERRIDE(OP): Performed by: INTERNAL MEDICINE

## 2021-06-22 PROCEDURE — G0378 HOSPITAL OBSERVATION PER HR: HCPCS

## 2021-06-22 PROCEDURE — A9270 NON-COVERED ITEM OR SERVICE: HCPCS | Performed by: INTERNAL MEDICINE

## 2021-06-22 PROCEDURE — 96372 THER/PROPH/DIAG INJ SC/IM: CPT

## 2021-06-22 RX ORDER — OXYCODONE HYDROCHLORIDE 5 MG/1
5 TABLET ORAL
Status: DISCONTINUED | OUTPATIENT
Start: 2021-06-22 | End: 2021-06-28 | Stop reason: HOSPADM

## 2021-06-22 RX ORDER — PREGABALIN 150 MG/1
300 CAPSULE ORAL 2 TIMES DAILY
Status: DISCONTINUED | OUTPATIENT
Start: 2021-06-22 | End: 2021-06-28 | Stop reason: HOSPADM

## 2021-06-22 RX ORDER — LIDOCAINE 50 MG/G
1 PATCH TOPICAL ONCE
Status: COMPLETED | OUTPATIENT
Start: 2021-06-22 | End: 2021-06-23

## 2021-06-22 RX ADMIN — LIDOCAINE 1 PATCH: 50 PATCH TOPICAL at 22:54

## 2021-06-22 RX ADMIN — TOPIRAMATE 50 MG: 25 TABLET, FILM COATED ORAL at 04:50

## 2021-06-22 RX ADMIN — BACLOFEN 10 MG: 10 TABLET ORAL at 13:31

## 2021-06-22 RX ADMIN — FERROUS SULFATE TAB 325 MG (65 MG ELEMENTAL FE) 325 MG: 325 (65 FE) TAB at 09:33

## 2021-06-22 RX ADMIN — OMEPRAZOLE 40 MG: 20 CAPSULE, DELAYED RELEASE ORAL at 04:49

## 2021-06-22 RX ADMIN — ZIPRASIDONE HYDROCHLORIDE 40 MG: 40 CAPSULE ORAL at 17:17

## 2021-06-22 RX ADMIN — BACLOFEN 10 MG: 10 TABLET ORAL at 04:50

## 2021-06-22 RX ADMIN — ASPIRIN 81 MG: 81 TABLET, COATED ORAL at 04:49

## 2021-06-22 RX ADMIN — OXCARBAZEPINE 300 MG: 300 TABLET, FILM COATED ORAL at 04:50

## 2021-06-22 RX ADMIN — OXYCODONE 5 MG: 5 TABLET ORAL at 19:16

## 2021-06-22 RX ADMIN — NYSTATIN OINTMENT: 100000 OINTMENT TOPICAL at 17:17

## 2021-06-22 RX ADMIN — OXYBUTYNIN CHLORIDE 5 MG: 5 TABLET ORAL at 17:17

## 2021-06-22 RX ADMIN — MYCOPHENOLATE MOFETIL 1000 MG: 250 CAPSULE ORAL at 04:51

## 2021-06-22 RX ADMIN — BUSPIRONE HYDROCHLORIDE 30 MG: 30 TABLET ORAL at 17:16

## 2021-06-22 RX ADMIN — ZIPRASIDONE HYDROCHLORIDE 40 MG: 40 CAPSULE ORAL at 04:51

## 2021-06-22 RX ADMIN — PROCHLORPERAZINE EDISYLATE 5 MG: 5 INJECTION INTRAMUSCULAR; INTRAVENOUS at 20:13

## 2021-06-22 RX ADMIN — NYSTATIN OINTMENT: 100000 OINTMENT TOPICAL at 04:48

## 2021-06-22 RX ADMIN — MYCOPHENOLATE MOFETIL 1000 MG: 250 CAPSULE ORAL at 17:15

## 2021-06-22 RX ADMIN — Medication 10 MG: at 20:53

## 2021-06-22 RX ADMIN — BACLOFEN 10 MG: 10 TABLET ORAL at 17:17

## 2021-06-22 RX ADMIN — OXCARBAZEPINE 300 MG: 300 TABLET, FILM COATED ORAL at 17:16

## 2021-06-22 RX ADMIN — OXYBUTYNIN CHLORIDE 5 MG: 5 TABLET ORAL at 04:50

## 2021-06-22 RX ADMIN — TOPIRAMATE 50 MG: 25 TABLET, FILM COATED ORAL at 17:16

## 2021-06-22 RX ADMIN — ONDANSETRON 4 MG: 2 INJECTION INTRAMUSCULAR; INTRAVENOUS at 17:28

## 2021-06-22 RX ADMIN — BUDESONIDE AND FORMOTEROL FUMARATE DIHYDRATE 2 PUFF: 160; 4.5 AEROSOL RESPIRATORY (INHALATION) at 20:53

## 2021-06-22 RX ADMIN — FLUOXETINE 40 MG: 20 CAPSULE ORAL at 04:50

## 2021-06-22 RX ADMIN — PREGABALIN 300 MG: 150 CAPSULE ORAL at 17:16

## 2021-06-22 RX ADMIN — BUSPIRONE HYDROCHLORIDE 30 MG: 30 TABLET ORAL at 04:49

## 2021-06-22 RX ADMIN — OXYCODONE 5 MG: 5 TABLET ORAL at 02:23

## 2021-06-22 RX ADMIN — LIDOCAINE 1 PATCH: 50 PATCH TOPICAL at 09:34

## 2021-06-22 RX ADMIN — LEVOTHYROXINE SODIUM 100 MCG: 0.1 TABLET ORAL at 04:49

## 2021-06-22 RX ADMIN — ENOXAPARIN SODIUM 40 MG: 40 INJECTION SUBCUTANEOUS at 04:48

## 2021-06-22 ASSESSMENT — ENCOUNTER SYMPTOMS
VOMITING: 0
FALLS: 1
SHORTNESS OF BREATH: 0
BLURRED VISION: 0
CHILLS: 0
NAUSEA: 0
BACK PAIN: 1
FEVER: 0
WEAKNESS: 1

## 2021-06-22 ASSESSMENT — PAIN DESCRIPTION - PAIN TYPE
TYPE: CHRONIC PAIN
TYPE: CHRONIC PAIN
TYPE: ACUTE PAIN

## 2021-06-22 NOTE — PROGRESS NOTES
Hospital Medicine Daily Progress Note    Date of Service  6/22/2021    Chief Complaint  30 y.o. female admitted 6/20/2021 with fall    Hospital Course  30-year-old female with a history of schizophrenia, borderline personality disorder, morbid obesity, GERD, h/o optic neuritis, seizures, chronic headaches, polypharmacy and multiple readmissions admitted with fall at home during transfer and unable to care for herself. CT spine negative for fracture.       Interval Problem Update  SHARA overnight  Oxy tapered down  She is feeling  well and denies any current complaints    Consultants/Specialty  None    Code Status  Full Code    Disposition  Will need long term SNF, [facility], vs group home, would NOT send back home as patient has proven she cannot take care of herself and her 85 year old grandmother also cannot take care of patient.    Review of Systems  Review of Systems   Constitutional: Positive for malaise/fatigue. Negative for chills and fever.   HENT: Negative for hearing loss.    Eyes: Negative for blurred vision.   Respiratory: Negative for shortness of breath.    Cardiovascular: Negative for chest pain.   Gastrointestinal: Negative for nausea and vomiting.   Genitourinary: Negative for hematuria.        Pain with purwick   Musculoskeletal: Positive for back pain and falls.   Skin: Positive for itching. Negative for rash.   Neurological: Positive for weakness.        Physical Exam  Temp:  [36 °C (96.8 °F)-37.6 °C (99.6 °F)] 37.6 °C (99.6 °F)  Pulse:  [66-84] 66  Resp:  [14-17] 16  BP: ()/(56-77) 101/67  SpO2:  [93 %-97 %] 97 %    Physical Exam  Vitals and nursing note reviewed.   Constitutional:       General: She is not in acute distress.     Appearance: She is obese. She is not toxic-appearing or diaphoretic.   HENT:      Head: Normocephalic and atraumatic.      Nose: Nose normal.      Mouth/Throat:      Mouth: Mucous membranes are moist.   Eyes:      General: No scleral icterus.     Conjunctiva/sclera:  Conjunctivae normal.   Cardiovascular:      Rate and Rhythm: Normal rate and regular rhythm.      Heart sounds: No murmur heard.   No friction rub. No gallop.    Pulmonary:      Effort: Pulmonary effort is normal.      Breath sounds: Normal breath sounds.   Abdominal:      General: Bowel sounds are normal. There is no distension.      Palpations: Abdomen is soft.   Genitourinary:     Comments: Mild groin and vulva erythema  Musculoskeletal:      Right lower leg: No edema.      Left lower leg: No edema.   Skin:     Coloration: Skin is not jaundiced.   Neurological:      Mental Status: She is alert and oriented to person, place, and time.   Psychiatric:         Mood and Affect: Mood normal.         Behavior: Behavior normal.         Fluids    Intake/Output Summary (Last 24 hours) at 6/22/2021 1241  Last data filed at 6/21/2021 2000  Gross per 24 hour   Intake 540 ml   Output --   Net 540 ml       Laboratory  Recent Labs     06/20/21  0128   WBC 6.2   RBC 4.42   HEMOGLOBIN 13.0   HEMATOCRIT 40.4   MCV 91.4   MCH 29.4   MCHC 32.2*   RDW 45.9   PLATELETCT 181   MPV 12.2     Recent Labs     06/20/21  0128 06/21/21  0321   SODIUM 141 137   POTASSIUM 3.3* 3.8   CHLORIDE 108 110   CO2 22 17*   GLUCOSE 110* 95   BUN 14 9   CREATININE 0.79 0.66   CALCIUM 8.9 9.1                   Imaging  CT-LSPINE W/O PLUS RECONS   Final Result      1.  No acute fracture is identified      2.  Status post anterior fusion L4-5.      3.  Right nephrolithiasis           Assessment/Plan  Failure to thrive in adult  Assessment & Plan  Multiple re-admissions, falls at home, limited support (85 year old grandmother who can't take care of patient)  Needs long term care    Seizures (HCC)  Assessment & Plan  Continue home AEDs: topamax, trileptal    Schizoaffective disorder, depressive type (HCC)- (present on admission)  Assessment & Plan  Follow-up behavioral health consult,   Recurrent ER presentations concerning for failure to thrive in current  residential setting.    Hypokalemia- (present on admission)  Assessment & Plan  S/p supplementation    Optic neuritis- (present on admission)  Assessment & Plan  Continue home cellcept    Muscular deconditioning  Assessment & Plan  Recently seen by PT/OT who recommended SNF, below baseline    Hypothyroidism- (present on admission)  Assessment & Plan  Continue home synthroid, recent TSH 2 weeks ago normal    Diabetes mellitus type 2 in obese (HCC)- (present on admission)  Assessment & Plan  Holding home trulicity  A1c 5.4 two weeks ago  Diabetic diet    Polypharmacy- (present on admission)  Assessment & Plan  Holding lyrica, BB, ivabradine, trazodone, muscle relaxers   oxycodone reduced to daily PRN    GERD (gastroesophageal reflux disease)- (present on admission)  Assessment & Plan  Continue home PPI    Anxiety- (present on admission)  Assessment & Plan  Continue home psych meds: prozac, buspar       VTE prophylaxis: Lovenox

## 2021-06-22 NOTE — PROGRESS NOTES
Pt requesting lyrica 300 mg twice daily due to her oxy 5 mg changed to once daily. Verbal order received from Dorothy Graf MD for lyrica 300 mg twice daily. Prescription verified with MD.

## 2021-06-22 NOTE — CARE PLAN
The patient is stable    Shift Goals  Clinical Goals: pain control    Progress made toward(s) clinical / shift goals:  pain assessment q 2 hours, medicated as needed, comfort measures provided.    Patient is  progressing towards the following goals:

## 2021-06-22 NOTE — DISCHARGE PLANNING
Anticipated Discharge Disposition: LTC     Action: Lsw met with pt to discuss LTC placement. Lsw advised pt that it may take some time to get an acceptance for a long-term bed, pt verbalized understanding. Pt is okay with blanket referral to SNFs locally as well as rural. Pt highlighted [facility] as they were willing to take pt in a yr prior but no female bed and [facility] has she was in service with their HH. Pt confirmed that her grandmother can no longer care for her this time and she can no longer return home.     Barriers to Discharge: LTC placement    Plan: Lsw to f/u with referrals

## 2021-06-22 NOTE — CARE PLAN
The patient is stable    Shift Goals  Clinical Goals: pain control    Progress made toward(s) clinical / shift goals:  pain assessment 2 q hours, medicated as neede, comfort measures provided.    Patient is progressing towards the following goals:

## 2021-06-22 NOTE — DISCHARGE PLANNING
Received Choice form at [phone]  Agency/Facility Name: [facility]   Referral sent per Choice form @ [phone]

## 2021-06-22 NOTE — CARE PLAN
Problem: Pain - Standard  Goal: Alleviation of pain or a reduction in pain to the patient’s comfort goal  Outcome: Progressing     Problem: Knowledge Deficit - Standard  Goal: Patient and family/care givers will demonstrate understanding of plan of care, disease process/condition, diagnostic tests and medications  Outcome: Progressing     Problem: Fall Risk  Goal: Patient will remain free from falls  Outcome: Progressing     Problem: Skin Integrity  Goal: Skin integrity is maintained or improved  Outcome: Progressing   The patient is Stable - Low risk of patient condition declining or worsening    Shift Goals  Clinical Goals: pain control    Progress made toward(s) clinical / shift goals:  Pt free from falls. Bed alarm and fall precautions in place. Pt educated to call for assistance. Patient verbalizes pain control at this time. Pt is able to turn self from side to side.    Patient is not progressing towards the following goals:

## 2021-06-23 ENCOUNTER — APPOINTMENT (OUTPATIENT)
Dept: RADIOLOGY | Facility: MEDICAL CENTER | Age: 31
DRG: 882 | End: 2021-06-23
Attending: HOSPITALIST
Payer: MEDICARE

## 2021-06-23 ENCOUNTER — APPOINTMENT (OUTPATIENT)
Dept: RADIOLOGY | Facility: MEDICAL CENTER | Age: 31
DRG: 882 | End: 2021-06-23
Attending: STUDENT IN AN ORGANIZED HEALTH CARE EDUCATION/TRAINING PROGRAM
Payer: MEDICARE

## 2021-06-23 PROBLEM — H53.2 DIPLOPIA: Status: ACTIVE | Noted: 2021-06-23

## 2021-06-23 PROBLEM — R07.89 OTHER CHEST PAIN: Status: ACTIVE | Noted: 2017-03-30

## 2021-06-23 LAB
ALBUMIN SERPL BCP-MCNC: 4 G/DL (ref 3.2–4.9)
ALBUMIN/GLOB SERPL: 2 G/DL
ALP SERPL-CCNC: 81 U/L (ref 30–99)
ALT SERPL-CCNC: 14 U/L (ref 2–50)
ANION GAP SERPL CALC-SCNC: 12 MMOL/L (ref 7–16)
AST SERPL-CCNC: 11 U/L (ref 12–45)
BILIRUB SERPL-MCNC: 0.3 MG/DL (ref 0.1–1.5)
BUN SERPL-MCNC: 10 MG/DL (ref 8–22)
CALCIUM SERPL-MCNC: 9.3 MG/DL (ref 8.5–10.5)
CHLORIDE SERPL-SCNC: 109 MMOL/L (ref 96–112)
CO2 SERPL-SCNC: 17 MMOL/L (ref 20–33)
CREAT SERPL-MCNC: 0.7 MG/DL (ref 0.5–1.4)
EKG IMPRESSION: NORMAL
EKG IMPRESSION: NORMAL
ERYTHROCYTE [DISTWIDTH] IN BLOOD BY AUTOMATED COUNT: 44.7 FL (ref 35.9–50)
GLOBULIN SER CALC-MCNC: 2 G/DL (ref 1.9–3.5)
GLUCOSE SERPL-MCNC: 122 MG/DL (ref 65–99)
HCT VFR BLD AUTO: 42.9 % (ref 37–47)
HGB BLD-MCNC: 14 G/DL (ref 12–16)
MCH RBC QN AUTO: 29.2 PG (ref 27–33)
MCHC RBC AUTO-ENTMCNC: 32.6 G/DL (ref 33.6–35)
MCV RBC AUTO: 89.4 FL (ref 81.4–97.8)
PLATELET # BLD AUTO: 181 K/UL (ref 164–446)
PMV BLD AUTO: 11.9 FL (ref 9–12.9)
POTASSIUM SERPL-SCNC: 4 MMOL/L (ref 3.6–5.5)
PROT SERPL-MCNC: 6 G/DL (ref 6–8.2)
RBC # BLD AUTO: 4.8 M/UL (ref 4.2–5.4)
SODIUM SERPL-SCNC: 138 MMOL/L (ref 135–145)
TROPONIN T SERPL-MCNC: <6 NG/L (ref 6–19)
WBC # BLD AUTO: 4.8 K/UL (ref 4.8–10.8)

## 2021-06-23 PROCEDURE — 84484 ASSAY OF TROPONIN QUANT: CPT

## 2021-06-23 PROCEDURE — 700102 HCHG RX REV CODE 250 W/ 637 OVERRIDE(OP): Performed by: INTERNAL MEDICINE

## 2021-06-23 PROCEDURE — 94760 N-INVAS EAR/PLS OXIMETRY 1: CPT

## 2021-06-23 PROCEDURE — 99226 PR SUBSEQUENT OBSERVATION CARE,LEVEL III: CPT | Performed by: HOSPITALIST

## 2021-06-23 PROCEDURE — 306467 ARJO LARGE SLING FOR UP TO 500LBS: Performed by: HOSPITALIST

## 2021-06-23 PROCEDURE — 36415 COLL VENOUS BLD VENIPUNCTURE: CPT

## 2021-06-23 PROCEDURE — 700102 HCHG RX REV CODE 250 W/ 637 OVERRIDE(OP): Performed by: STUDENT IN AN ORGANIZED HEALTH CARE EDUCATION/TRAINING PROGRAM

## 2021-06-23 PROCEDURE — A9270 NON-COVERED ITEM OR SERVICE: HCPCS | Performed by: STUDENT IN AN ORGANIZED HEALTH CARE EDUCATION/TRAINING PROGRAM

## 2021-06-23 PROCEDURE — A9270 NON-COVERED ITEM OR SERVICE: HCPCS | Performed by: INTERNAL MEDICINE

## 2021-06-23 PROCEDURE — 700111 HCHG RX REV CODE 636 W/ 250 OVERRIDE (IP): Performed by: INTERNAL MEDICINE

## 2021-06-23 PROCEDURE — 74018 RADEX ABDOMEN 1 VIEW: CPT

## 2021-06-23 PROCEDURE — 96372 THER/PROPH/DIAG INJ SC/IM: CPT

## 2021-06-23 PROCEDURE — 93010 ELECTROCARDIOGRAM REPORT: CPT | Performed by: INTERNAL MEDICINE

## 2021-06-23 PROCEDURE — A9270 NON-COVERED ITEM OR SERVICE: HCPCS | Performed by: HOSPITALIST

## 2021-06-23 PROCEDURE — 85027 COMPLETE CBC AUTOMATED: CPT

## 2021-06-23 PROCEDURE — 93005 ELECTROCARDIOGRAM TRACING: CPT | Performed by: HOSPITALIST

## 2021-06-23 PROCEDURE — G0378 HOSPITAL OBSERVATION PER HR: HCPCS

## 2021-06-23 PROCEDURE — 700102 HCHG RX REV CODE 250 W/ 637 OVERRIDE(OP): Performed by: HOSPITALIST

## 2021-06-23 PROCEDURE — 96376 TX/PRO/DX INJ SAME DRUG ADON: CPT

## 2021-06-23 PROCEDURE — 71045 X-RAY EXAM CHEST 1 VIEW: CPT

## 2021-06-23 PROCEDURE — 80053 COMPREHEN METABOLIC PANEL: CPT

## 2021-06-23 PROCEDURE — 94640 AIRWAY INHALATION TREATMENT: CPT

## 2021-06-23 RX ORDER — TRAMADOL HYDROCHLORIDE 50 MG/1
50 TABLET ORAL ONCE
Status: COMPLETED | OUTPATIENT
Start: 2021-06-23 | End: 2021-06-23

## 2021-06-23 RX ORDER — TRAMADOL HYDROCHLORIDE 50 MG/1
50 TABLET ORAL EVERY 6 HOURS PRN
Status: DISCONTINUED | OUTPATIENT
Start: 2021-06-23 | End: 2021-06-23 | Stop reason: CLARIF

## 2021-06-23 RX ORDER — POLYETHYLENE GLYCOL 3350 17 G/17G
1 POWDER, FOR SOLUTION ORAL DAILY
Status: DISCONTINUED | OUTPATIENT
Start: 2021-06-24 | End: 2021-06-28 | Stop reason: HOSPADM

## 2021-06-23 RX ADMIN — Medication 10 MG: at 21:10

## 2021-06-23 RX ADMIN — ACETAMINOPHEN 650 MG: 325 TABLET, FILM COATED ORAL at 15:56

## 2021-06-23 RX ADMIN — NYSTATIN OINTMENT: 100000 OINTMENT TOPICAL at 05:25

## 2021-06-23 RX ADMIN — ASPIRIN 81 MG: 81 TABLET, COATED ORAL at 05:25

## 2021-06-23 RX ADMIN — MYCOPHENOLATE MOFETIL 1000 MG: 250 CAPSULE ORAL at 05:26

## 2021-06-23 RX ADMIN — PREGABALIN 300 MG: 150 CAPSULE ORAL at 05:26

## 2021-06-23 RX ADMIN — TOPIRAMATE 50 MG: 25 TABLET, FILM COATED ORAL at 05:26

## 2021-06-23 RX ADMIN — ZIPRASIDONE HYDROCHLORIDE 40 MG: 40 CAPSULE ORAL at 05:26

## 2021-06-23 RX ADMIN — MAGNESIUM CITRATE 296 ML: 1.75 LIQUID ORAL at 05:27

## 2021-06-23 RX ADMIN — FERROUS SULFATE TAB 325 MG (65 MG ELEMENTAL FE) 325 MG: 325 (65 FE) TAB at 09:46

## 2021-06-23 RX ADMIN — TRAMADOL HYDROCHLORIDE 50 MG: 50 TABLET, FILM COATED ORAL at 21:10

## 2021-06-23 RX ADMIN — OXCARBAZEPINE 300 MG: 300 TABLET, FILM COATED ORAL at 05:26

## 2021-06-23 RX ADMIN — DOCUSATE SODIUM 50 MG AND SENNOSIDES 8.6 MG 2 TABLET: 8.6; 5 TABLET, FILM COATED ORAL at 16:30

## 2021-06-23 RX ADMIN — BUSPIRONE HYDROCHLORIDE 30 MG: 30 TABLET ORAL at 16:30

## 2021-06-23 RX ADMIN — BACLOFEN 10 MG: 10 TABLET ORAL at 05:26

## 2021-06-23 RX ADMIN — PROCHLORPERAZINE EDISYLATE 5 MG: 5 INJECTION INTRAMUSCULAR; INTRAVENOUS at 03:09

## 2021-06-23 RX ADMIN — BACLOFEN 10 MG: 10 TABLET ORAL at 16:30

## 2021-06-23 RX ADMIN — BUDESONIDE AND FORMOTEROL FUMARATE DIHYDRATE 2 PUFF: 160; 4.5 AEROSOL RESPIRATORY (INHALATION) at 08:30

## 2021-06-23 RX ADMIN — OMEPRAZOLE 40 MG: 20 CAPSULE, DELAYED RELEASE ORAL at 05:26

## 2021-06-23 RX ADMIN — LEVOTHYROXINE SODIUM 100 MCG: 0.1 TABLET ORAL at 05:26

## 2021-06-23 RX ADMIN — FLUOXETINE 40 MG: 20 CAPSULE ORAL at 05:25

## 2021-06-23 RX ADMIN — ENOXAPARIN SODIUM 40 MG: 40 INJECTION SUBCUTANEOUS at 05:27

## 2021-06-23 RX ADMIN — TOPIRAMATE 50 MG: 25 TABLET, FILM COATED ORAL at 16:30

## 2021-06-23 RX ADMIN — NYSTATIN OINTMENT: 100000 OINTMENT TOPICAL at 16:39

## 2021-06-23 RX ADMIN — ONDANSETRON 4 MG: 2 INJECTION INTRAMUSCULAR; INTRAVENOUS at 02:09

## 2021-06-23 RX ADMIN — ACETAMINOPHEN 650 MG: 325 TABLET, FILM COATED ORAL at 01:09

## 2021-06-23 RX ADMIN — PROCHLORPERAZINE EDISYLATE 5 MG: 5 INJECTION INTRAMUSCULAR; INTRAVENOUS at 08:32

## 2021-06-23 RX ADMIN — BUSPIRONE HYDROCHLORIDE 30 MG: 30 TABLET ORAL at 05:25

## 2021-06-23 RX ADMIN — PROCHLORPERAZINE EDISYLATE 5 MG: 5 INJECTION INTRAMUSCULAR; INTRAVENOUS at 13:59

## 2021-06-23 RX ADMIN — OXCARBAZEPINE 300 MG: 300 TABLET, FILM COATED ORAL at 16:30

## 2021-06-23 RX ADMIN — OXYBUTYNIN CHLORIDE 5 MG: 5 TABLET ORAL at 05:26

## 2021-06-23 RX ADMIN — MYCOPHENOLATE MOFETIL 1000 MG: 250 CAPSULE ORAL at 16:32

## 2021-06-23 RX ADMIN — OXYCODONE 5 MG: 5 TABLET ORAL at 12:43

## 2021-06-23 RX ADMIN — OXYBUTYNIN CHLORIDE 5 MG: 5 TABLET ORAL at 16:30

## 2021-06-23 RX ADMIN — PREGABALIN 300 MG: 150 CAPSULE ORAL at 16:30

## 2021-06-23 RX ADMIN — BACLOFEN 10 MG: 10 TABLET ORAL at 12:43

## 2021-06-23 RX ADMIN — ZIPRASIDONE HYDROCHLORIDE 40 MG: 40 CAPSULE ORAL at 16:32

## 2021-06-23 RX ADMIN — DOCUSATE SODIUM 50 MG AND SENNOSIDES 8.6 MG 2 TABLET: 8.6; 5 TABLET, FILM COATED ORAL at 05:26

## 2021-06-23 ASSESSMENT — ENCOUNTER SYMPTOMS
WEAKNESS: 1
VOMITING: 0
BACK PAIN: 1
PALPITATIONS: 1
ORTHOPNEA: 0
COUGH: 0
BLURRED VISION: 0
SHORTNESS OF BREATH: 0
FALLS: 1
DOUBLE VISION: 1
CHILLS: 0
NAUSEA: 0
EYE PAIN: 0
FEVER: 0

## 2021-06-23 ASSESSMENT — PAIN DESCRIPTION - PAIN TYPE
TYPE: ACUTE PAIN

## 2021-06-23 NOTE — DISCHARGE PLANNING
Received Choice form at 0900  Agency/Facility Name: [facility], [facility], [facility], [facility], [facility], [facility]   Referral sent per Choice form @ 0900

## 2021-06-23 NOTE — DISCHARGE PLANNING
Anticipated Discharge Disposition: LTC    Action: Lsw spoke with Zayraad at [facility] who stated that they are unable to take in pt due to having one female bed that will be occupied by another resident. Deepti stated that she was also concerned with pt's age in a nursing home for LTC.     Barriers to Discharge: placement    Plan: Lsw to f/u with other referrals

## 2021-06-23 NOTE — CARE PLAN
"Leader Fall Prevention Follow Up.  Unassisted fall 6/23/2021 @ 1100    The patient is Stable - Low risk of patient condition declining or worsening    Shift Goals  Clinical Goals: pain control    Progress made toward(s) clinical / shift goals:  Prevent unassisted falls  during the shift    Patient is not progressing towards the following goals:      Problem: Fall Risk  Goal: Patient will remain free from falls  Outcome: Not Progressing      1:1 follow up with patient after the unassisted fall.   RN  Leader went through an Extensive Fall Prevention Education with the patient. Goal is to prevent falls and injuries during this admission. According to patient, at home she is used of rolling out of bed when she is in excruciating pain. This RN informed patient that it was unsafe to \"roll\" in bed as it may cause her to fall straight to the floor and injure herself. She is highly encouraged to press the call light when attempting to get OOB. This RN presented the importance of having the bed alarm all at all times.    Telesitter Monitor was also ordered for the patient to prevent falls in the future. Nikki Sling available at bedside if needed. Patient is understanding and agreeable with the Plan of care.    Environment of care checked.  Call light within reach.   Educated on the importance of calling for assistance when attempting to be OOB.  BED ALARM ON.   "

## 2021-06-23 NOTE — PROGRESS NOTES
Hospital Medicine Daily Progress Note    Date of Service  6/23/2021    Chief Complaint  30 y.o. female admitted 6/20/2021 with fall    Hospital Course  30-year-old female with a history of schizophrenia, borderline personality disorder, morbid obesity, GERD, h/o optic neuritis, seizures, chronic headaches, polypharmacy and multiple readmissions admitted with fall at home during transfer and unable to care for herself. CT spine negative for fracture.       Interval Problem Update  Had abdominal pain overnight and abdominal x-ray showed severe stool burden.  She was given mag citrate.  Her abdominal pain has resolved  Rapid response this a.m. is patient complaining of chest pain and double vision.  I evaluated patient on bedside and she states she is having a constant sharp pain that is deep and associated with palpitations.  She says her double vision spontaneously started and is not improved with either eye being closed.  Her eyes are tracking normally and extraocular movements are intact  On reevaluation her diplopia and chest pain have resolved  She had a GLF while trying to roll out of bed. She denies any head trauma, headache, or new neurological changes  Stat EKG and chest x-ray reviewed by me with no acute pathology noted      Consultants/Specialty  None    Code Status  Full Code    Disposition  Will need long term SNF, [facility], vs group home, would NOT send back home as patient has proven she cannot take care of herself and her 85 year old grandmother also cannot take care of patient.    Review of Systems  Review of Systems   Constitutional: Positive for malaise/fatigue. Negative for chills and fever.   HENT: Negative for hearing loss.    Eyes: Positive for double vision. Negative for blurred vision and pain.   Respiratory: Negative for cough and shortness of breath.    Cardiovascular: Positive for chest pain and palpitations. Negative for orthopnea.   Gastrointestinal: Negative for nausea and vomiting.    Genitourinary: Negative for hematuria.        Pain with purwick   Musculoskeletal: Positive for back pain and falls.   Skin: Positive for itching. Negative for rash.   Neurological: Positive for weakness.        Physical Exam  Temp:  [36.2 °C (97.2 °F)-37 °C (98.6 °F)] 36.3 °C (97.4 °F)  Pulse:  [64-90] 90  Resp:  [16-22] 18  BP: (103-116)/(51-71) 111/71  SpO2:  [92 %-98 %] 94 %    Physical Exam  Vitals and nursing note reviewed.   Constitutional:       General: She is not in acute distress.     Appearance: She is obese. She is not toxic-appearing or diaphoretic.   HENT:      Head: Normocephalic and atraumatic.      Nose: Nose normal.      Mouth/Throat:      Mouth: Mucous membranes are moist.   Eyes:      General: No scleral icterus.     Extraocular Movements: Extraocular movements intact.      Conjunctiva/sclera: Conjunctivae normal.   Cardiovascular:      Rate and Rhythm: Normal rate and regular rhythm.      Heart sounds: No murmur heard.   No friction rub. No gallop.    Pulmonary:      Effort: Pulmonary effort is normal.      Breath sounds: Normal breath sounds.   Abdominal:      General: Bowel sounds are normal. There is no distension.      Palpations: Abdomen is soft.   Genitourinary:     Comments: Mild groin and vulva erythema  Musculoskeletal:      Right lower leg: No edema.      Left lower leg: No edema.   Skin:     Coloration: Skin is not jaundiced.   Neurological:      Mental Status: She is alert and oriented to person, place, and time. Mental status is at baseline.   Psychiatric:         Mood and Affect: Mood normal.         Behavior: Behavior normal.         Fluids    Intake/Output Summary (Last 24 hours) at 6/23/2021 1303  Last data filed at 6/23/2021 1000  Gross per 24 hour   Intake 240 ml   Output --   Net 240 ml       Laboratory  Recent Labs     06/23/21  1035   WBC 4.8   RBC 4.80   HEMOGLOBIN 14.0   HEMATOCRIT 42.9   MCV 89.4   MCH 29.2   MCHC 32.6*   RDW 44.7   PLATELETCT 181   MPV 11.9      Recent Labs     06/21/21  0321 06/23/21  1035   SODIUM 137 138   POTASSIUM 3.8 4.0   CHLORIDE 110 109   CO2 17* 17*   GLUCOSE 95 122*   BUN 9 10   CREATININE 0.66 0.70   CALCIUM 9.1 9.3                   Imaging  DX-CHEST-PORTABLE (1 VIEW)   Final Result      No acute cardiac or pulmonary abnormality is noted.      [id number] VIEW   Final Result      Above average stool volume      Hepatomegaly      CT-LSPINE W/O PLUS RECONS   Final Result      1.  No acute fracture is identified      2.  Status post anterior fusion L4-5.      3.  Right nephrolithiasis           Assessment/Plan  Diplopia  Assessment & Plan  Unclear etiology  Began shortly after chest pain  Resolved on its own    Failure to thrive in adult  Assessment & Plan  Multiple re-admissions, falls at home, limited support (85 year old grandmother who can't take care of patient)  Needs long term care    Seizures (HCC)  Assessment & Plan  Continue home AEDs: topamax, trileptal    Schizoaffective disorder, depressive type (HCC)- (present on admission)  Assessment & Plan  Follow-up behavioral health consult,   Recurrent ER presentations concerning for failure to thrive in current residential setting.    Hypokalemia- (present on admission)  Assessment & Plan  S/p supplementation    Optic neuritis- (present on admission)  Assessment & Plan  Continue home cellcept    Muscular deconditioning  Assessment & Plan  Recently seen by PT/OT who recommended SNF, below baseline    Hypothyroidism- (present on admission)  Assessment & Plan  Continue home synthroid, recent TSH 2 weeks ago normal    Diabetes mellitus type 2 in obese (HCC)- (present on admission)  Assessment & Plan  Holding home trulicity  A1c 5.4 two weeks ago  Diabetic diet    Other chest pain  Assessment & Plan  Sharp in nature, not cardiac  EKG reviewed  Resolved on its own    Polypharmacy- (present on admission)  Assessment & Plan  Holding lyrica, BB, ivabradine, trazodone, muscle relaxers   oxycodone  reduced to daily PRN    GERD (gastroesophageal reflux disease)- (present on admission)  Assessment & Plan  Continue home PPI    Anxiety- (present on admission)  Assessment & Plan  Continue home psych meds: prozac buspar       VTE prophylaxis: Lovenox

## 2021-06-23 NOTE — PROGRESS NOTES
"0800 Pt verbalizing \"heart pounding\" and chest pain on L chest. Vital signs stable 106/67 pulse 84, O2 92% in RA, Respirations 22. Pt denies anxiety or any other symptom. No SOB, headache. Hospitalist paged.    8745 Pt still experiencing \"deep, sharp\" chest pain and double vision. Rapid response initiated. Hospitalist informed.  "

## 2021-06-23 NOTE — PROGRESS NOTES
Spoke to Dr. Mayer with hospitalist team regarding patient's c/o of abdominal pain on RLQ. Received orders for x-ray of the abdomen.

## 2021-06-23 NOTE — CARE PLAN
Problem: Pain - Standard  Goal: Alleviation of pain or a reduction in pain to the patient’s comfort goal  Outcome: Progressing  Note: Patient medicated for pain as per MAR. Patient encouraged to verbalize pain levels. Patient provided with nonpharmacologic methods of pain management. Verbalized understanding.   Problem: Skin Integrity  Goal: Skin integrity is maintained or improved  Outcome: Progressing  Note: Skin precautions in place.    The patient is Stable - Low risk of patient condition declining or worsening    Shift Goals  Clinical Goals: pain control    Progress made toward(s) clinical / shift goals:  pain management, skin precautions in place.    Patient is not progressing towards the following goals:

## 2021-06-23 NOTE — CARE PLAN
Problem: Pain - Standard  Goal: Alleviation of pain or a reduction in pain to the patient’s comfort goal  Outcome: Progressing     Problem: Knowledge Deficit - Standard  Goal: Patient and family/care givers will demonstrate understanding of plan of care, disease process/condition, diagnostic tests and medications  Outcome: Progressing     Problem: Fall Risk  Goal: Patient will remain free from falls  Outcome: Progressing   The patient is Watcher - Medium risk of patient condition declining or worsening    Shift Goals  Clinical Goals: pain control    Progress made toward(s) clinical / shift goals:  Pt free from falls, fall precautions in place. Pt medicated as prescribed. Pt educated on pain medication available and frequency.    Patient is not progressing towards the following goals:

## 2021-06-23 NOTE — PROGRESS NOTES
Paged Dr. Mayer regarding patient's c/o pain on sacrum, low back area. Received orders for lidocaine patch ONCE transdermal.

## 2021-06-23 NOTE — PROGRESS NOTES
Report received from Day RN at 1915. Assumed care of patient. Plan of care discussed, questions answered. Assessment completed. VSS, A&O x4, states pain on low back. PRN med given. Call light in reach. Patient educated on use of call light for assistance.

## 2021-06-23 NOTE — PROGRESS NOTES
Overnight Hospitalist Note      Patient complaining of right lower quadrant abdominal pain.  Abdominal x-ray per my review showed increased stool volume in the ascending colon.  I examined the patient at bedside and she has hypoactive bowel sounds and also tenderness in the right side of the abdomen with a palpable firm mass correlating well with area of increased stool burden along the ascending colon.  This is most likely from constipation versus impacted stool in setting of narcotic pain medication use.  Patient reported she is passing gas.  Magnesium citrate ordered along with daily MiraLAX.

## 2021-06-23 NOTE — THERAPY
Physical Therapy Contact Note    PT treatment attempted. RN requested hold, patient complaining of chest pain. Will re attempt as appropriate and able.    Alexandria Chou, PT, DPT  [phone]

## 2021-06-23 NOTE — PROGRESS NOTES
"0800 Pt verbalizing \"heart pounding\" and chest pain on L chest. Vital signs stable 106/67 pulse 84, O2 92% in RA, Respirations 22. Pt denies anxiety or any other symptom. No SOB, headache. Hospitalist paged.     0815 Pt still experiencing \"deep, sharp\" chest pain and double vision. Rapid response initiated. Hospitalist informed. MD at bedside. EKG and stat labs ordered by MD.    0900 Pt resting in bed with no signs of distress. Call light within reach, bed alarm on. Bed in lowest position.    1100 Pt found on the floor after making humming noises. This RN was 5 feet away with patient on second bed when humming noises were heard, and as I was walking around to check on her pt fell on her side to the ground. Pt denies hitting head. Bed alarm went off. Pt stated she was \"rolling over\" because she was in pain. Nikki used to lift patient off the floor. Pt states \"everything hurts\". Vital signs stable BP:107/65, pulse 94, temp 97.9, O2 96% RA. Paged initiated to Lesia De La Cruz MD. Telesitter ordered for patient and in room.    All fall precautions were in placed, bed alarm went off, pt near the door. Pt educated to call for assistance, and to not get out of bed without help. Pt verbalizes understanding.     1130 Second page initiated to Dr. Graf.  1145 Dr. Graf notified. No new orders received. Pt to get daily oxy 5mg now.  1200 Pt educated about pain medication once daily 5 mg to be given now. Pt stated \"So all that was for nothing?\". Pt informed about no new orders for pain medication at this time but to let me know if pain gets unbearable so I could contact doctor.    1440 Pt requesting more pain medication at this time. Pt requesting to speak to doctor if she is unable to get more pain medication due to increased pain. Hospitalist paged.    1530 Pt verbalizing chest pain, HA, blurry vision and \"seeing stars\". VS stable BP:121/66, PULSE: 100, R: 16, O2:94% RA, T:96.6. Dr Graf paged and updated about current situation. " "New order for stat EKG and troponin. No order for narcotics at this time. Okay to give tylenol for HA.    1600 VS stable /61, pulse 102, respirations 18, O2 94 on 1L of oxygen for comfort. Pt verbalizing increased chest pain. Dr. Graf notified via Voalte.    1800 Pt requesting pain medication. Patient verbalizes her vision is better and no longer seeing \"stars\". Dr. Graf paged. Verbal order received for Tramadol 50 mg oral ONCE. Verbal order verified.    1815 Pt informed about order for ONE time dose of Tramadol 50 mg now ordered by Dr. Graf. Pt verbalizes understanding.    1900 Report given to NOC RN. Fall precautions in place.  "

## 2021-06-24 ENCOUNTER — APPOINTMENT (OUTPATIENT)
Dept: RADIOLOGY | Facility: MEDICAL CENTER | Age: 31
DRG: 882 | End: 2021-06-24
Attending: STUDENT IN AN ORGANIZED HEALTH CARE EDUCATION/TRAINING PROGRAM
Payer: MEDICARE

## 2021-06-24 PROBLEM — R07.89 OTHER CHEST PAIN: Status: RESOLVED | Noted: 2017-03-30 | Resolved: 2021-06-24

## 2021-06-24 PROBLEM — F48.9 PSYCHOGENIC DISORDER: Status: ACTIVE | Noted: 2021-06-24

## 2021-06-24 PROBLEM — E11.69 DIABETES MELLITUS TYPE 2 IN OBESE: Status: RESOLVED | Noted: 2017-04-13 | Resolved: 2021-06-24

## 2021-06-24 PROBLEM — E66.9 DIABETES MELLITUS TYPE 2 IN OBESE: Status: RESOLVED | Noted: 2017-04-13 | Resolved: 2021-06-24

## 2021-06-24 LAB — GLUCOSE BLD-MCNC: 144 MG/DL (ref 65–99)

## 2021-06-24 PROCEDURE — 770006 HCHG ROOM/CARE - MED/SURG/GYN SEMI*

## 2021-06-24 PROCEDURE — 3E03317 INTRODUCTION OF OTHER THROMBOLYTIC INTO PERIPHERAL VEIN, PERCUTANEOUS APPROACH: ICD-10-PCS | Performed by: STUDENT IN AN ORGANIZED HEALTH CARE EDUCATION/TRAINING PROGRAM

## 2021-06-24 PROCEDURE — 99222 1ST HOSP IP/OBS MODERATE 55: CPT | Performed by: PSYCHIATRY & NEUROLOGY

## 2021-06-24 PROCEDURE — 700102 HCHG RX REV CODE 250 W/ 637 OVERRIDE(OP): Performed by: INTERNAL MEDICINE

## 2021-06-24 PROCEDURE — 70496 CT ANGIOGRAPHY HEAD: CPT

## 2021-06-24 PROCEDURE — A9270 NON-COVERED ITEM OR SERVICE: HCPCS | Performed by: HOSPITALIST

## 2021-06-24 PROCEDURE — 700111 HCHG RX REV CODE 636 W/ 250 OVERRIDE (IP): Mod: JG | Performed by: STUDENT IN AN ORGANIZED HEALTH CARE EDUCATION/TRAINING PROGRAM

## 2021-06-24 PROCEDURE — 700111 HCHG RX REV CODE 636 W/ 250 OVERRIDE (IP): Performed by: INTERNAL MEDICINE

## 2021-06-24 PROCEDURE — A9270 NON-COVERED ITEM OR SERVICE: HCPCS | Performed by: INTERNAL MEDICINE

## 2021-06-24 PROCEDURE — 700102 HCHG RX REV CODE 250 W/ 637 OVERRIDE(OP): Performed by: HOSPITALIST

## 2021-06-24 PROCEDURE — 51798 US URINE CAPACITY MEASURE: CPT

## 2021-06-24 PROCEDURE — 99226 PR SUBSEQUENT OBSERVATION CARE,LEVEL III: CPT | Performed by: HOSPITALIST

## 2021-06-24 PROCEDURE — 96375 TX/PRO/DX INJ NEW DRUG ADDON: CPT

## 2021-06-24 PROCEDURE — 82962 GLUCOSE BLOOD TEST: CPT

## 2021-06-24 PROCEDURE — 96376 TX/PRO/DX INJ SAME DRUG ADON: CPT

## 2021-06-24 PROCEDURE — 99358 PROLONG SERVICE W/O CONTACT: CPT | Performed by: STUDENT IN AN ORGANIZED HEALTH CARE EDUCATION/TRAINING PROGRAM

## 2021-06-24 PROCEDURE — 0042T CT-CEREBRAL PERFUSION ANALYSIS: CPT

## 2021-06-24 PROCEDURE — 70450 CT HEAD/BRAIN W/O DYE: CPT | Mod: ME

## 2021-06-24 PROCEDURE — 70498 CT ANGIOGRAPHY NECK: CPT

## 2021-06-24 PROCEDURE — 700117 HCHG RX CONTRAST REV CODE 255: Performed by: STUDENT IN AN ORGANIZED HEALTH CARE EDUCATION/TRAINING PROGRAM

## 2021-06-24 RX ORDER — SODIUM CHLORIDE 9 MG/ML
50 INJECTION, SOLUTION INTRAVENOUS ONCE
Status: DISCONTINUED | OUTPATIENT
Start: 2021-06-24 | End: 2021-06-24

## 2021-06-24 RX ORDER — FLUOXETINE HYDROCHLORIDE 20 MG/1
60 CAPSULE ORAL DAILY
Status: DISCONTINUED | OUTPATIENT
Start: 2021-06-25 | End: 2021-06-28 | Stop reason: HOSPADM

## 2021-06-24 RX ADMIN — DOCUSATE SODIUM 50 MG AND SENNOSIDES 8.6 MG 2 TABLET: 8.6; 5 TABLET, FILM COATED ORAL at 17:33

## 2021-06-24 RX ADMIN — IOHEXOL 40 ML: 350 INJECTION, SOLUTION INTRAVENOUS at 05:56

## 2021-06-24 RX ADMIN — Medication 10 MG: at 19:58

## 2021-06-24 RX ADMIN — OXYBUTYNIN CHLORIDE 5 MG: 5 TABLET ORAL at 17:33

## 2021-06-24 RX ADMIN — OXYCODONE 5 MG: 5 TABLET ORAL at 15:31

## 2021-06-24 RX ADMIN — BACLOFEN 10 MG: 10 TABLET ORAL at 13:03

## 2021-06-24 RX ADMIN — ZIPRASIDONE HYDROCHLORIDE 40 MG: 40 CAPSULE ORAL at 17:32

## 2021-06-24 RX ADMIN — OXCARBAZEPINE 300 MG: 300 TABLET, FILM COATED ORAL at 08:26

## 2021-06-24 RX ADMIN — TOPIRAMATE 50 MG: 25 TABLET, FILM COATED ORAL at 17:32

## 2021-06-24 RX ADMIN — ONDANSETRON 4 MG: 2 INJECTION INTRAMUSCULAR; INTRAVENOUS at 18:50

## 2021-06-24 RX ADMIN — BACLOFEN 10 MG: 10 TABLET ORAL at 08:26

## 2021-06-24 RX ADMIN — FERROUS SULFATE TAB 325 MG (65 MG ELEMENTAL FE) 325 MG: 325 (65 FE) TAB at 08:26

## 2021-06-24 RX ADMIN — OXYBUTYNIN CHLORIDE 5 MG: 5 TABLET ORAL at 08:24

## 2021-06-24 RX ADMIN — BACLOFEN 10 MG: 10 TABLET ORAL at 17:32

## 2021-06-24 RX ADMIN — PREGABALIN 300 MG: 150 CAPSULE ORAL at 17:33

## 2021-06-24 RX ADMIN — LEVOTHYROXINE SODIUM 100 MCG: 0.1 TABLET ORAL at 08:24

## 2021-06-24 RX ADMIN — OXCARBAZEPINE 300 MG: 300 TABLET, FILM COATED ORAL at 17:33

## 2021-06-24 RX ADMIN — FLUOXETINE 40 MG: 20 CAPSULE ORAL at 08:26

## 2021-06-24 RX ADMIN — ZIPRASIDONE HYDROCHLORIDE 40 MG: 40 CAPSULE ORAL at 08:25

## 2021-06-24 RX ADMIN — IOHEXOL 80 ML: 350 INJECTION, SOLUTION INTRAVENOUS at 05:58

## 2021-06-24 RX ADMIN — MYCOPHENOLATE MOFETIL 1000 MG: 250 CAPSULE ORAL at 17:32

## 2021-06-24 RX ADMIN — BUSPIRONE HYDROCHLORIDE 30 MG: 30 TABLET ORAL at 08:25

## 2021-06-24 RX ADMIN — PROMETHAZINE HYDROCHLORIDE 25 MG: 25 TABLET ORAL at 19:58

## 2021-06-24 RX ADMIN — ONDANSETRON 4 MG: 2 INJECTION INTRAMUSCULAR; INTRAVENOUS at 00:10

## 2021-06-24 RX ADMIN — BUSPIRONE HYDROCHLORIDE 30 MG: 30 TABLET ORAL at 17:32

## 2021-06-24 RX ADMIN — ACETAMINOPHEN 650 MG: 325 TABLET, FILM COATED ORAL at 01:48

## 2021-06-24 RX ADMIN — OMEPRAZOLE 40 MG: 20 CAPSULE, DELAYED RELEASE ORAL at 08:27

## 2021-06-24 RX ADMIN — PREGABALIN 300 MG: 150 CAPSULE ORAL at 08:24

## 2021-06-24 RX ADMIN — TOPIRAMATE 50 MG: 25 TABLET, FILM COATED ORAL at 04:46

## 2021-06-24 RX ADMIN — MYCOPHENOLATE MOFETIL 1000 MG: 250 CAPSULE ORAL at 08:23

## 2021-06-24 RX ADMIN — ALTEPLASE 81 MG: KIT at 06:34

## 2021-06-24 RX ADMIN — ACETAMINOPHEN 650 MG: 325 TABLET, FILM COATED ORAL at 13:03

## 2021-06-24 ASSESSMENT — ENCOUNTER SYMPTOMS
HEADACHES: 1
DEPRESSION: 1
EYE PAIN: 0
VOMITING: 0
DOUBLE VISION: 0
INSOMNIA: 1
FOCAL WEAKNESS: 1
CARDIOVASCULAR NEGATIVE: 1
SHORTNESS OF BREATH: 0
FEVER: 0
RESPIRATORY NEGATIVE: 1
GASTROINTESTINAL NEGATIVE: 1
WEAKNESS: 1
TREMORS: 1
FALLS: 1
ORTHOPNEA: 0
CHILLS: 0
NAUSEA: 0
BLURRED VISION: 0
COUGH: 0
HALLUCINATIONS: 1
BACK PAIN: 1
PALPITATIONS: 0

## 2021-06-24 ASSESSMENT — PAIN DESCRIPTION - PAIN TYPE
TYPE: ACUTE PAIN

## 2021-06-24 NOTE — PROGRESS NOTES
Overnight Hospitalist Note      Called to bedside due to acute change in neurologic status.  Code stroke was called with the with Rapid Response Team.      Per my exam, patient had stuttering speech with profound left-sided motor and sensory deficit in both the upper and lower extremities.  She had no facial droop and no gaze preference.  No facial asymmetry.  No sensation to significant pain elicitation in the left upper and lower extremities.      CTA of the head and neck showed no acute occlusion of major vessels.  CT perfusion study was unremarkable.      Cerebral perfusion study per my review shows no asymmetry.    CTA of the head per my review shows no occlusion of the major cerebral arteries.    I discussed the clinical findings with Dr. Hamilton, neurologist on call.  Although imaging studies were negative for an acute stroke, occlusion of small perforating vessels could not be excluded.  I discussed the benefits of possibleblot clot busting as well as risk including 5% risk of death with the patient with TPA infusion.  I explained to her that her imaging studies did not show a stroke.  Per her request, her mother was called for consultation in the decision making.  Because of patient's history of pseudoseizures and psychiatric history, the mother advised the patient not to get the TPA because she believed it might be psychiatrically related.  However, the mother reiterated the patient was an adult and able to make decisions for herself.  Following the telephone call, the patient decided to proceed with TPA infusion.  Again, it was reiterated to her that there would be a 5% risk of hemorrhage and death.  However, she wanted to continue to proceed.  One minute into the infusion, the pharmacist and rapid response team noted that the patient reported having a adverse sensation and then lifted her left arm to touch her head.  She was also moving her left lower extremity apparently normally.  When I came by  bedside again, the patient was antigravity in the left lower extremity and also the left upper extremity but not giving full strength.  Given the rapid improvement without completion of the TPA and inconsistent neurological exam, it was felt that a diagnosis of conversion disorder may be more likely in the differential.    Patient was last seen normal at 4:20 AM.  TPA infusion was started at 6:32 AM.    Again following discussion with Dr. Hamilton again, TPA was discontinued and transfer to the ICU was canceled.  Patient received approximately 10% (9 mg) of her total TPA dose (90 mg).      Recommend consideration of psychiatric consultation.      Prolonged inpatient without face-to-face: I have spent a total of 98 minutes discussing the patient's case with the neurologist on-call, ordering and reviewing imaging studies and discussing again with the neurologist on-call, explaining clinical situation for TPA administration and the risks with the patient's mother over the phone, discussion with the Rapid Response Team, clinical pharmacist, and in communication with the critical care intensivist.  Start time 05:12 AM.  End time  6:50 AM.
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
## 2021-06-24 NOTE — THERAPY
Missed Therapy     Patient Name: Kristin Balderrama  Age:  30 y.o., Sex:  female  Medical Record #: [id number]  Today's Date: 6/24/2021    Pt with code stroke initiated this AM with rapid response team needed. Will hold OT session today, follow up 6/25 as appropriate.        Joe Gonzalez OTD, OTR/L

## 2021-06-24 NOTE — CONSULTS
BRIEF INPATIENT BEHAVIORAL HEALTH NOTE   Consult received.  Patient will be seen by Dr. Calhoun for medication evaluation.      Thank you for the consult      Patti Bowman [facility]  Behavioral Health Therapist

## 2021-06-24 NOTE — PROGRESS NOTES
Brief neurology note    30-year-old female who is well-known to neurology has had multiple admissions throughout the years.  Carries a diagnosis of optic neuritis on CellCept.  Distant his seizure history on low-dose Vimpat and Trileptal.  Obesity.  Spinal cord stimulator.  She has been admitted to hospital numerous times uncountable at this time for various complaints of worsening vision with diplopia vision loss.  Also bilateral leg weakness.  Headaches.  Also carries a diagnosis of pseudotumor cerebri without papilledema.      She is been admitted here for the past few days due to ground-level falls and inability of her family take care of her anymore.  Per the nursing staff she was last normal at 4:20 AM.  Reassessment later found to have a left-sided weakness arm and leg are not antigravity.  Possible left facial droop.  She is following commands.  She has stuttering.   She will tell her name. Does not like aphasia.        Spoke with the hospitalist at bedside with the patient.  I am unable evaluate the  patient due to in-house alert,  There is no  telemetry machine.  Must rely on information from rapid nurse and on-call hospitalist for examination and history.     Patient has dense left sided weakness.  Unable to move the arm or leg.  Applied noxious stimuli to the arm and leg with minimal movement also.  Hospitalist did a Morris maneuver with the legs,  did not feel any pressure under the left leg when the patient lift up her right leg.  Questionable facial droop on the left.  Patient says she has decrease sensation in the left also.  The CT and CTA head and neck are unremarkable my review.  Hospitalist went over the risk and benefits of TPA including bleeding which could cause death or severe his ability.  Patient understood this risk and benefits of tPA and that she wants TPA.  Per nursing staff onset was 420.  Patient was back to her baseline all day yesterday.  Patient denies headaches at this time.  At  this time we do not have any other contraindications to TPA.  Therefore decision was made to give TPA at this time.  Localization of potential infarct is in the internal capsule on the right or spinal cord infarct.  Could be functional/Pyschogenic is  possibility, however patient throughout all her admissions and prior complaints has never c/o a one-sided weakness.  Unfortunately patient unable to get MRI brain due to spinal cord stimulator.  TPA was pushed at 6:10 AM.  After lengthy discussion with the patient at bedside and bedside reevaluations with the hospitalist.  The stroke scale per rapid nurse was a 10.  Patient will be admitted to ICU for observation overnight.  Plan to repeat CT head tomorrow.      Reinier Hamilton M.D.

## 2021-06-24 NOTE — CODE DOCUMENTATION
0530 - Pt to CT on monitor and 2L O2 w/ Rapid RN x2.   0600 - Returned to T323 without significant events. Pt resting in bed in no signs of distress. VSS en route. Dr. Mayer at bedside. Joy contacted this RN during transport to discuss options and POC

## 2021-06-24 NOTE — CODE DOCUMENTATION
"After lengthy discussion with Dr. Mayer and Dr Hamilton, alteplase ordered. Risks and benefits including death discussed with patient. Mother contacted and discussed risks/benefits as well. Mother attempting to convince pt against decision to receive alteplase. Pt consenting to receive medication. Judith Dodge contacted to ensure patient is legally competent to make medical decisions for self. No history indicating patient is unable to make her own decisions. Pt consenting for alteplase at this time. Rapid Response RN dirk at bedside, pharmacist Anna at bedside, Dr Mayer at bedside. 9 mg alteplase bolus given at 0632. Pt asked \"is the medication in?\" RNs stating yes. Pt immediately moves previously flaccid LUE to her forehead stating \"I feel weird\". This RN then questioned how the patient was able to move her LUE so quickly and easy. Pt refusing to answer question at this time. Pt refusing to elaborate on how she feels weird. Dr Mayer and Dr Lee notified of patient's immediate improvement. Infusion stopped per the MAR. Noted voluntary movement of patients previously flaccid LLE. Dr Mayer and Dr Hamilton decided patient does not need full dose infusion as well as discontinued ICU transfer orders. Pt remains on monitor at this time. Vital signs stable. Per Dr Mayer and Dr Hamilton, no need for ICU transfer at this time. Informed primary RN of events.  "

## 2021-06-24 NOTE — CARE PLAN
Problem: Fall Risk  Goal: Patient will remain free from falls  Outcome: Not Progressing  Note: Educated on fall risk and use of call light for assistance. Bed alarm on, telesitter at bedside for safety. Hourly rounding in place.  Problem: Pain - Standard  Goal: Alleviation of pain or a reduction in pain to the patient’s comfort goal  Outcome: Progressing  Note: Patient medicated for pain as per MAR. Patient encouraged to verbalize pain levels. Patient provided with nonpharmacologic methods of pain management. Verbalized understanding.   Problem: Knowledge Deficit - Standard  Goal: Patient and family/care givers will demonstrate understanding of plan of care, disease process/condition, diagnostic tests and medications  Outcome: Progressing    The patient is Watcher - Medium risk of patient condition declining or worsening    Shift Goals  Clinical Goals: pain control    Progress made toward(s) clinical / shift goals:  Fall risk precautions in place, pain control.    Patient is not progressing towards the following goals:      Problem: Fall Risk  Goal: Patient will remain free from falls  Outcome: Not Progressing

## 2021-06-24 NOTE — PROGRESS NOTES
Patient started having slurred speech and weakness to left side. Last known well was at 0345. Rapid response called, rapid team at bedside. MD notified. MD at bedside. Labs collected, Patient down to CT via Rapid team.
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
## 2021-06-24 NOTE — PROGRESS NOTES
4 Eyes Skin Assessment Completed by SONYA Ortiz and SONYA Zepeda.    Head WDL  Ears WDL  Nose WDL  Mouth Redness  Neck WDL  Breast/Chest Blanching  Shoulder Blades WDL  Spine WDL  (R) Arm/Elbow/Hand WDL  (L) Arm/Elbow/Hand WDL  Abdomen Bruising  Groin WDL  Scrotum/Coccyx/Buttocks WDL  (R) Leg WDL  (L) Leg WDL  (R) Heel/Foot/Toe WDL with Callus around heel   (L) Heel/Foot/Toe WDL with Callus areas around heel          Devices In Places Pulse Ox and SCD's, pure wick in place      Interventions In Place Heel Mepilex, Waffle Overlay, Q2 Turns, Barrier Cream, Heels Loaded W/Pillows and Pressure Redistribution Mattress    Possible Skin Injury No    Pictures Uploaded Into Epic N/A  Wound Consult Placed N/A  RN Wound Prevention Protocol Ordered No

## 2021-06-24 NOTE — PROGRESS NOTES
Assumed care of pt at 0800. Report received and bedside rounding completed with night RN. Pt is calm no SOB, or in any acute distress noted.     Fall precautions in place,  bed alarm. - Treaded non slip socks. Bed locked. Communication board updated with POC. Call light and pt belongings within reach - hourly rounding in place.     Tele-Sitter in place   1445: Pt. Was able to urinate in the Pure wick. Not feeling like she is retaining anymore per pt.

## 2021-06-24 NOTE — PROGRESS NOTES
"0500 - RR paged for altered mental status.     0507 - Code Stroke Initiated    0515 - lab at bedside     0530 - to CT scan    0550 - Returned from CT without significant events. VSS       After lengthy discussion with Dr. Mayer and Dr Hamilton, alteplase ordered. Risks and benefits including death discussed with patient. Mother contacted and discussed risks/benefits as well. Mother attempting to convince pt against decision to receive alteplase. Pt consenting to receive medication.  Judith Dodge contacted to ensure patient is legally competent to make medical decisions for self. No history indicating patient is unable to make her own decisions. Pt consenting for alteplase at this time. Rapid Response RN dirk at bedside, pharmacist Anna at bedside, Dr Mayer at bedside. 9 mg alteplase bolus given at 0632. Pt asked \"is the medication in?\" RNs stating yes. Pt immediately moves previously flaccid LUE to her forehead stating \"I feel weird\". This RN then questioned how the patient was able to move her LUE so quickly and easy. Pt refusing to answer question at this time. Pt refusing to elaborate on how she feels weird. Dr Mayer and Dr Lee notified of patient's immediate improvement. Infusion stopped per the MAR. Noted voluntary movement of patients previously flaccid LLE. Dr Mayer and Dr Hamilton decided patient does not need full dose infusion as well as discontinuing ICU transfer orders. Pt remains on monitor at this time. Vital signs stable. Per Dr Mayer and Dr Hamilton, no need for ICU transfer at this time. Informed primary RN of events.  "

## 2021-06-24 NOTE — PROGRESS NOTES
[facility] Medicine Daily Progress Note    Date of Service  6/24/2021    Chief Complaint  30 y.o. female admitted 6/20/2021 with fall    Hospital Course  30-year-old female with a history of schizophrenia, borderline personality disorder, morbid obesity, GERD, h/o optic neuritis, seizures, chronic headaches, polypharmacy and multiple readmissions admitted with fall at home during transfer and unable to care for herself. CT spine negative for fracture.  Pt with rapid response 6/24 for left sided hemiplegia, CT head, CTA head and neck unremarkable. She was given 10% of tpa dose she asked if she was receiving the medication, when she was told that she was in she began moving her extremities and shortly there after stopped moving them again. Her tpa was discontinued given concern of psychogenic etiology of symptoms.        Interval Problem Update  Pt with rapid response early AM for left sided hemiplegia, CT head, CTA head and neck unremarkable. She was given 10% of tpa dose after which she was moving her extremities and shortly there after stopped moving them again. Her tpa was discontinued given concern of psychogenic etiology of symptoms.   I feel there may be a large psychogenic portion to patient's symptoms as yesterday she continued to report intermittent chest pain continuously asking for more narcotics all while appearing in no acute distress and stable vitals.   Patient resting comfortably this morning without any new complaints    Consultants/Specialty  Neurology  Psychiatry     Code Status  Full Code    Disposition  Will need long term SNF, halfway, vs group home, would NOT send back home as patient has proven she cannot take care of herself and her 85 year old grandmother also cannot take care of patient.    Review of Systems  Review of Systems   Constitutional: Positive for malaise/fatigue. Negative for chills and fever.   HENT: Negative for hearing loss.    Eyes: Negative for blurred vision, double vision and  pain.   Respiratory: Negative for cough and shortness of breath.    Cardiovascular: Negative for chest pain, palpitations and orthopnea.   Gastrointestinal: Negative for nausea and vomiting.   Genitourinary: Negative for hematuria.        Pain with purwick   Musculoskeletal: Positive for back pain and falls.   Skin: Positive for itching. Negative for rash.   Neurological: Positive for weakness.        Physical Exam  Temp:  [36.4 °C (97.6 °F)-37.2 °C (98.9 °F)] 36.8 °C (98.3 °F)  Pulse:  [62-93] 62  Resp:  [18-80] 18  BP: (105-135)/(64-84) 111/64  SpO2:  [93 %-99 %] 96 %    Physical Exam  Vitals and nursing note reviewed.   Constitutional:       General: She is not in acute distress.     Appearance: She is obese. She is not toxic-appearing or diaphoretic.   HENT:      Head: Normocephalic and atraumatic.      Nose: Nose normal.      Mouth/Throat:      Mouth: Mucous membranes are moist.   Eyes:      General: No scleral icterus.     Extraocular Movements: Extraocular movements intact.      Conjunctiva/sclera: Conjunctivae normal.   Cardiovascular:      Rate and Rhythm: Normal rate and regular rhythm.      Heart sounds: No murmur heard.   No friction rub. No gallop.    Pulmonary:      Effort: Pulmonary effort is normal.      Breath sounds: Normal breath sounds.   Abdominal:      General: Bowel sounds are normal. There is no distension.      Palpations: Abdomen is soft.   Genitourinary:     Comments: Mild groin and vulva erythema  Musculoskeletal:      Right lower leg: No edema.      Left lower leg: No edema.   Skin:     Coloration: Skin is not jaundiced.   Neurological:      Mental Status: She is alert and oriented to person, place, and time. Mental status is at baseline.   Psychiatric:         Mood and Affect: Mood normal.         Behavior: Behavior normal.         Fluids    Intake/Output Summary (Last 24 hours) at 6/24/2021 1227  Last data filed at 6/24/2021 0256  Gross per 24 hour   Intake --   Output 1100 ml   Net  -1100 ml       Laboratory  Recent Labs     06/23/21  1035   WBC 4.8   RBC 4.80   HEMOGLOBIN 14.0   HEMATOCRIT 42.9   MCV 89.4   MCH 29.2   MCHC 32.6*   RDW 44.7   PLATELETCT 181   MPV 11.9     Recent Labs     06/23/21  1035   SODIUM 138   POTASSIUM 4.0   CHLORIDE 109   CO2 17*   GLUCOSE 122*   BUN 10   CREATININE 0.70   CALCIUM 9.3                   Imaging  CT-CTA NECK WITH & W/O-POST PROCESSING   Final Result      1. No evidence of flow-limiting stenosis in the cervical carotid or cervical vertebral arteries.   2. Enlargement of main pulmonary artery, similar to prior could be seen with pulmonary hypertension.      CT-CEREBRAL PERFUSION ANALYSIS   Final Result      1.  Cerebral blood flow less than 30% likely representing completed infarct = 0 mL.      2.  T Max more than 6 seconds likely representing combination of completed infarct and ischemia = 0 mL.      3.  Mismatched volume likely representing ischemic brain/penumbra = None      4.  Please note that the cerebral perfusion was performed on the limited brain tissue around the basal ganglia region. Infarct/ischemia outside the CT perfusion sections can be missed in this study.      CT-CTA HEAD WITH & W/O-POST PROCESS   Final Result         1. No hemodynamically significant narrowing of the major intracranial vessels.      CT-HEAD W/O   Final Result         1. No acute intracranial abnormality. No evidence of acute intracranial hemorrhage or mass lesion.               DX-CHEST-PORTABLE (1 VIEW)   Final Result      No acute cardiac or pulmonary abnormality is noted.      [id number] VIEW   Final Result      Above average stool volume      Hepatomegaly      CT-LSPINE W/O PLUS RECONS   Final Result      1.  No acute fracture is identified      2.  Status post anterior fusion L4-5.      3.  Right nephrolithiasis           Assessment/Plan  Psychogenic disorder  Assessment & Plan  Complained of left-sided hemiplegia on 6/20 4 AM.  Negative CT head and CTA of head  and neck  Neurology had been involved in TPA has started to be administered however after a few minutes patient started moving her arms without any issues so TPA was discontinued after 10% was administered  Patient seems to be complaining of a variety of symptoms including diplopia, chest pain, and other neurological symptoms.  I think there may be a very heavy psychogenic component to this  I have consulted psychiatry    Diplopia  Assessment & Plan  Unclear etiology  Began shortly after chest pain  Resolved on its own    Failure to thrive in adult  Assessment & Plan  Multiple re-admissions, falls at home, limited support (85 year old grandmother who can't take care of patient)  Needs long term care    Seizures (HCC)  Assessment & Plan  Continue home AEDs: topamax, trileptal    Schizoaffective disorder, depressive type (HCC)- (present on admission)  Assessment & Plan  Follow-up behavioral health consult,   Recurrent ER presentations concerning for failure to thrive in current residential setting.    Hypokalemia- (present on admission)  Assessment & Plan  S/p supplementation    Optic neuritis- (present on admission)  Assessment & Plan  Continue home cellcept    Muscular deconditioning  Assessment & Plan  Recently seen by PT/OT who recommended SNF, below baseline    Hypothyroidism- (present on admission)  Assessment & Plan  Continue home synthroid, recent TSH 2 weeks ago normal    Polypharmacy- (present on admission)  Assessment & Plan  Holding lyrica, BB, ivabradine, trazodone, muscle relaxers   oxycodone reduced to daily PRN    GERD (gastroesophageal reflux disease)- (present on admission)  Assessment & Plan  Continue home PPI    Anxiety- (present on admission)  Assessment & Plan  Continue home psych meds: prozac, buspar       VTE prophylaxis: Lovenox

## 2021-06-24 NOTE — PROGRESS NOTES
Report received from Day RN at 1915. Assumed care of patient. Plan of care discussed, questions answered. Assessment completed. VSS, A&O x4, denies chest pain at this time, states generalized pain and nausea. PRN med given. Call light in reach. Patient educated on use of call light for assistance.    Telesitter at bedside for safety as patient had a fall 6/23. Hourly rounding in place. Educated patient on use of call light for assistance.

## 2021-06-24 NOTE — ASSESSMENT & PLAN NOTE
Complained of left-sided hemiplegia on 6/20 4 AM.  Negative CT head and CTA of head and neck  Neurology had been involved in TPA has started to be administered however after a few minutes patient started moving her arms without any issues so TPA was discontinued after 10% was administered  Patient seems to be complaining of a variety of symptoms including diplopia, chest pain, and other neurological symptoms.  I think there may be a very heavy psychogenic component to this  Patient has been seen by psychiatry and behavioral therapy

## 2021-06-24 NOTE — CARE PLAN
The patient is Stable - Low risk of patient condition declining or worsening    Shift Goals  Clinical Goals: pain control    Progress made toward(s) clinical / shift goals:  Pain control and fall risk interventions    Patient is not progressing towards the following goals:      Problem: Pain - Standard  Goal: Alleviation of pain or a reduction in pain to the patient’s comfort goal  Outcome: Progressing  Note: Educated about the pain scale and non pharmacological pain methods, check the MAR      Problem: Fall Risk  Goal: Patient will remain free from falls  Outcome: Progressing  Note: Tele-sitter in place, bed in lowest position, educated about using the call light, treaded socks in place, upper side rails up, bed near nursing station

## 2021-06-24 NOTE — PROGRESS NOTES
Update      Per the hospitalist Dr. Mayer and the pharmacist at bedside.  Right after pushing the bolus of TPA patient started moving her left arm and leg normally.  Also speech was normal.  The infusion was stopped.  Patient did question if that was a drug.  She then proceeded to have a what appears to be a panic attack.  Now she is nonresponsive not moving any extremity.  Based on this response I believe this is all psychogenic.  We canceled the TPA.  Cancel ICU admission.      However I believe we need to really consider removing the spinal cord stimulator in her back in order to get MRI brain as this is a problem that most likely will repeat itself in the future.  Given her diagnosis with optic neuritis and history of transverse myelitis MRI will be very helpful in this case.      Reinier Hamilton M.D.

## 2021-06-25 ENCOUNTER — APPOINTMENT (OUTPATIENT)
Dept: RADIOLOGY | Facility: MEDICAL CENTER | Age: 31
DRG: 882 | End: 2021-06-25
Attending: HOSPITALIST
Payer: MEDICARE

## 2021-06-25 LAB
APPEARANCE UR: ABNORMAL
BACTERIA #/AREA URNS HPF: ABNORMAL /HPF
BILIRUB UR QL STRIP.AUTO: NEGATIVE
COLOR UR: YELLOW
EPI CELLS #/AREA URNS HPF: ABNORMAL /HPF
GLUCOSE BLD-MCNC: 95 MG/DL (ref 65–99)
GLUCOSE UR STRIP.AUTO-MCNC: NEGATIVE MG/DL
HYALINE CASTS #/AREA URNS LPF: ABNORMAL /LPF
KETONES UR STRIP.AUTO-MCNC: NEGATIVE MG/DL
LEUKOCYTE ESTERASE UR QL STRIP.AUTO: ABNORMAL
MICRO URNS: ABNORMAL
NITRITE UR QL STRIP.AUTO: POSITIVE
PH UR STRIP.AUTO: 6 [PH] (ref 5–8)
PROT UR QL STRIP: NEGATIVE MG/DL
RBC # URNS HPF: ABNORMAL /HPF
RBC UR QL AUTO: NEGATIVE
SP GR UR STRIP.AUTO: 1.03
UROBILINOGEN UR STRIP.AUTO-MCNC: 0.2 MG/DL
WBC #/AREA URNS HPF: ABNORMAL /HPF

## 2021-06-25 PROCEDURE — A9270 NON-COVERED ITEM OR SERVICE: HCPCS | Performed by: HOSPITALIST

## 2021-06-25 PROCEDURE — 99232 SBSQ HOSP IP/OBS MODERATE 35: CPT | Performed by: HOSPITALIST

## 2021-06-25 PROCEDURE — 700102 HCHG RX REV CODE 250 W/ 637 OVERRIDE(OP): Performed by: INTERNAL MEDICINE

## 2021-06-25 PROCEDURE — 87186 SC STD MICRODIL/AGAR DIL: CPT

## 2021-06-25 PROCEDURE — 700111 HCHG RX REV CODE 636 W/ 250 OVERRIDE (IP): Performed by: INTERNAL MEDICINE

## 2021-06-25 PROCEDURE — 87077 CULTURE AEROBIC IDENTIFY: CPT | Mod: 91

## 2021-06-25 PROCEDURE — A9270 NON-COVERED ITEM OR SERVICE: HCPCS | Performed by: INTERNAL MEDICINE

## 2021-06-25 PROCEDURE — 97535 SELF CARE MNGMENT TRAINING: CPT

## 2021-06-25 PROCEDURE — 81001 URINALYSIS AUTO W/SCOPE: CPT

## 2021-06-25 PROCEDURE — 97530 THERAPEUTIC ACTIVITIES: CPT

## 2021-06-25 PROCEDURE — 97530 THERAPEUTIC ACTIVITIES: CPT | Mod: CQ

## 2021-06-25 PROCEDURE — A9270 NON-COVERED ITEM OR SERVICE: HCPCS | Performed by: PSYCHIATRY & NEUROLOGY

## 2021-06-25 PROCEDURE — 87086 URINE CULTURE/COLONY COUNT: CPT

## 2021-06-25 PROCEDURE — 700101 HCHG RX REV CODE 250: Performed by: INTERNAL MEDICINE

## 2021-06-25 PROCEDURE — 82962 GLUCOSE BLOOD TEST: CPT

## 2021-06-25 PROCEDURE — 770006 HCHG ROOM/CARE - MED/SURG/GYN SEMI*

## 2021-06-25 PROCEDURE — 700102 HCHG RX REV CODE 250 W/ 637 OVERRIDE(OP): Performed by: HOSPITALIST

## 2021-06-25 PROCEDURE — 700102 HCHG RX REV CODE 250 W/ 637 OVERRIDE(OP): Performed by: PSYCHIATRY & NEUROLOGY

## 2021-06-25 RX ORDER — NITROFURANTOIN 25; 75 MG/1; MG/1
100 CAPSULE ORAL 2 TIMES DAILY WITH MEALS
Status: COMPLETED | OUTPATIENT
Start: 2021-06-25 | End: 2021-06-28

## 2021-06-25 RX ORDER — TRAMADOL HYDROCHLORIDE 50 MG/1
25 TABLET ORAL EVERY 12 HOURS PRN
Status: DISCONTINUED | OUTPATIENT
Start: 2021-06-25 | End: 2021-06-28 | Stop reason: HOSPADM

## 2021-06-25 RX ADMIN — DOCUSATE SODIUM 50 MG AND SENNOSIDES 8.6 MG 2 TABLET: 8.6; 5 TABLET, FILM COATED ORAL at 04:58

## 2021-06-25 RX ADMIN — ZIPRASIDONE HYDROCHLORIDE 40 MG: 40 CAPSULE ORAL at 16:34

## 2021-06-25 RX ADMIN — NITROFURANTOIN MONOHYDRATE/MACROCRYSTALLINE 100 MG: 25; 75 CAPSULE ORAL at 18:08

## 2021-06-25 RX ADMIN — PREGABALIN 300 MG: 150 CAPSULE ORAL at 16:34

## 2021-06-25 RX ADMIN — TOPIRAMATE 50 MG: 25 TABLET, FILM COATED ORAL at 04:58

## 2021-06-25 RX ADMIN — Medication 10 MG: at 20:03

## 2021-06-25 RX ADMIN — OXCARBAZEPINE 300 MG: 300 TABLET, FILM COATED ORAL at 04:58

## 2021-06-25 RX ADMIN — PREGABALIN 300 MG: 150 CAPSULE ORAL at 04:57

## 2021-06-25 RX ADMIN — ACETAMINOPHEN 650 MG: 325 TABLET, FILM COATED ORAL at 00:22

## 2021-06-25 RX ADMIN — ZIPRASIDONE HYDROCHLORIDE 40 MG: 40 CAPSULE ORAL at 04:59

## 2021-06-25 RX ADMIN — MYCOPHENOLATE MOFETIL 1000 MG: 250 CAPSULE ORAL at 16:34

## 2021-06-25 RX ADMIN — MYCOPHENOLATE MOFETIL 1000 MG: 250 CAPSULE ORAL at 04:57

## 2021-06-25 RX ADMIN — BUSPIRONE HYDROCHLORIDE 30 MG: 30 TABLET ORAL at 04:59

## 2021-06-25 RX ADMIN — NYSTATIN OINTMENT: 100000 OINTMENT TOPICAL at 16:35

## 2021-06-25 RX ADMIN — ONDANSETRON 4 MG: 2 INJECTION INTRAMUSCULAR; INTRAVENOUS at 23:24

## 2021-06-25 RX ADMIN — OXYCODONE 5 MG: 5 TABLET ORAL at 21:02

## 2021-06-25 RX ADMIN — ASPIRIN 81 MG: 81 TABLET, COATED ORAL at 06:28

## 2021-06-25 RX ADMIN — OXYBUTYNIN CHLORIDE 5 MG: 5 TABLET ORAL at 16:34

## 2021-06-25 RX ADMIN — BACLOFEN 10 MG: 10 TABLET ORAL at 04:59

## 2021-06-25 RX ADMIN — BUSPIRONE HYDROCHLORIDE 30 MG: 30 TABLET ORAL at 16:32

## 2021-06-25 RX ADMIN — BACLOFEN 10 MG: 10 TABLET ORAL at 16:33

## 2021-06-25 RX ADMIN — ONDANSETRON 4 MG: 4 TABLET, ORALLY DISINTEGRATING ORAL at 04:52

## 2021-06-25 RX ADMIN — ACETAMINOPHEN 650 MG: 325 TABLET, FILM COATED ORAL at 08:18

## 2021-06-25 RX ADMIN — LEVOTHYROXINE SODIUM 100 MCG: 0.1 TABLET ORAL at 04:57

## 2021-06-25 RX ADMIN — LIDOCAINE 1 PATCH: 50 PATCH TOPICAL at 08:18

## 2021-06-25 RX ADMIN — FLUOXETINE 60 MG: 20 CAPSULE ORAL at 05:06

## 2021-06-25 RX ADMIN — OMEPRAZOLE 40 MG: 20 CAPSULE, DELAYED RELEASE ORAL at 04:59

## 2021-06-25 RX ADMIN — NYSTATIN OINTMENT: 100000 OINTMENT TOPICAL at 06:28

## 2021-06-25 RX ADMIN — OXCARBAZEPINE 300 MG: 300 TABLET, FILM COATED ORAL at 16:33

## 2021-06-25 RX ADMIN — ONDANSETRON 4 MG: 2 INJECTION INTRAMUSCULAR; INTRAVENOUS at 04:59

## 2021-06-25 RX ADMIN — FERROUS SULFATE TAB 325 MG (65 MG ELEMENTAL FE) 325 MG: 325 (65 FE) TAB at 08:18

## 2021-06-25 RX ADMIN — DOCUSATE SODIUM 50 MG AND SENNOSIDES 8.6 MG 2 TABLET: 8.6; 5 TABLET, FILM COATED ORAL at 16:33

## 2021-06-25 RX ADMIN — OXYBUTYNIN CHLORIDE 5 MG: 5 TABLET ORAL at 04:58

## 2021-06-25 RX ADMIN — TOPIRAMATE 50 MG: 25 TABLET, FILM COATED ORAL at 16:34

## 2021-06-25 RX ADMIN — TRAMADOL HYDROCHLORIDE 25 MG: 50 TABLET, FILM COATED ORAL at 12:32

## 2021-06-25 RX ADMIN — BACLOFEN 10 MG: 10 TABLET ORAL at 11:46

## 2021-06-25 ASSESSMENT — COGNITIVE AND FUNCTIONAL STATUS - GENERAL
TOILETING: A LOT
DRESSING REGULAR UPPER BODY CLOTHING: A LITTLE
TURNING FROM BACK TO SIDE WHILE IN FLAT BAD: A LITTLE
SUGGESTED CMS G CODE MODIFIER DAILY ACTIVITY: CK
HELP NEEDED FOR BATHING: A LOT
DRESSING REGULAR LOWER BODY CLOTHING: A LOT
EATING MEALS: A LITTLE
PERSONAL GROOMING: A LITTLE
WALKING IN HOSPITAL ROOM: TOTAL
MOVING TO AND FROM BED TO CHAIR: A LOT
DAILY ACTIVITIY SCORE: 15
MOVING FROM LYING ON BACK TO SITTING ON SIDE OF FLAT BED: UNABLE
SUGGESTED CMS G CODE MODIFIER MOBILITY: CL
STANDING UP FROM CHAIR USING ARMS: A LITTLE
MOBILITY SCORE: 11
CLIMB 3 TO 5 STEPS WITH RAILING: TOTAL

## 2021-06-25 ASSESSMENT — PAIN DESCRIPTION - PAIN TYPE
TYPE: ACUTE PAIN

## 2021-06-25 ASSESSMENT — ENCOUNTER SYMPTOMS
NAUSEA: 0
VOMITING: 0
SHORTNESS OF BREATH: 0
DOUBLE VISION: 0
COUGH: 0
CHILLS: 0
FEVER: 0
ORTHOPNEA: 0
FALLS: 1
EYE PAIN: 0
WEAKNESS: 1
PALPITATIONS: 0
BACK PAIN: 1
BLURRED VISION: 0

## 2021-06-25 ASSESSMENT — GAIT ASSESSMENTS: GAIT LEVEL OF ASSIST: UNABLE TO PARTICIPATE

## 2021-06-25 NOTE — CARE PLAN
Problem: Fall Risk  Goal: Patient will remain free from falls  Outcome: Progressing     Problem: Skin Integrity  Goal: Skin integrity is maintained or improved  Outcome: Progressing     The patient is Stable - Low risk of patient condition declining or worsening    Shift Goals  Clinical Goals: comfort, rest    Progress made toward(s) clinical / shift goals: Pt has not fallen this shift; Q2 turns and hourly rounding in place; pt encouraged to verbalize feelings and any needs/concerns; pt currently resting; call light and belongings within reach     Patient is not progressing towards the following goals:

## 2021-06-25 NOTE — THERAPY
"Physical Therapy   Daily Treatment     Patient Name: Kristin Balderrama  Age:  30 y.o., Sex:  female  Medical Record #: [id number]  Today's Date: 6/25/2021     Precautions: Fall Risk    Assessment    Pt progressing as expected w/ therapy. Pt needing Angelita for bed mobility however appeared to have the strength to perform w/out assist. Pt only needing set up of chair for transfer. She was able to place the SB and perform transfer w/out assist. Anticipate pt is close to her baseline. Her needs appear more social at this time.    Plan    Continue current treatment plan.    DC Equipment Recommendations: None  Discharge Recommendations: Recommend post-acute placement for additional physical therapy services prior to discharge home      Subjective    \"Look at my feet, they are just floppy. I need those boots.\"     Objective       06/25/21 0958   Precautions   Precautions Fall Risk   Comments schizophrenia, borderline personality disorder, polypharmacy, seizures   Gait Analysis   Gait Level Of Assist Unable to Participate   Comments Pt hasn't ambulated in over 2 years   Bed Mobility    Supine to Sit Minimal Assist   Sit to Supine   (NT up in chair)   Scooting Supervised   Rolling Supervised   Comments Pt appears capable of performing w/out assist.   Functional Mobility   Sit to Stand   (Non ambulatory at baseline)   Bed, Chair, Wheelchair Transfer Minimal Assist  (CGA)   Transfer Method Slide Board   Mobility Pt only needing chair set up and blocked. Pt able to place SB and perform w/out assist.   Short Term Goals    Short Term Goal # 1 Pt will perform bed mobility with bed features and SPV in 3tx to allow for bed mobility at home.   Goal Outcome # 1 Progressing as expected   Short Term Goal # 2 Pt will perform slide board transfers with SPV in 6 tx to allow for transfers at home.   Goal Outcome # 2 Progressing as expected   Short Term Goal # 3 Patient will ambulate 50ft with supervision within 6tx in order to access " environment   Goal Outcome # 3   (Not an appropriate goal)

## 2021-06-25 NOTE — PROGRESS NOTES
4 Eyes Skin Assessment Completed by Angel RN and SONYA Tinajero     Head WDL  Ears WDL  Nose WDL  Mouth Redness  Neck WDL  Breast/Chest Blanching  Shoulder Blades WDL  Spine WDL  (R) Arm/Elbow/Hand WDL  (L) Arm/Elbow/Hand WDL  Abdomen Bruising  Groin WDL  Scrotum/Coccyx/Buttocks WDL  (R) Leg WDL  (L) Leg WDL  (R) Heel/Foot/Toe WDL with Callus around heel         (L) Heel/Foot/Toe WDL with Callus areas around heel              Devices In Places Pulse Ox and SCD's, pure wick in place        Interventions In Place Heel Mepilex, Waffle Overlay, Q2 Turns, Barrier Cream, Heels Loaded W/Pillows and Pressure Redistribution Mattress     Possible Skin Injury No     Pictures Uploaded Into Epic N/A  Wound Consult Placed N/A  RN Wound Prevention Protocol Ordered No

## 2021-06-25 NOTE — CARE PLAN
The patient is Stable - Low risk of patient condition declining or worsening    Shift Goals  Clinical Goals: pain control    Progress made toward(s) clinical / shift goals:  Pain control and fall risk interventions     Patient is not progressing towards the following goals:      Problem: Pain - Standard  Goal: Alleviation of pain or a reduction in pain to the patient’s comfort goal  Outcome: Progressing  Note: Using non pharmacological pain methods, educated about the pain scale, check the MAR      Problem: Fall Risk  Goal: Patient will remain free from falls  Outcome: Progressing  Note: Tele-Sitter still in place, educated about using the call light and bed near nursing station, bed in lowest position, locked, upper side rails up,

## 2021-06-25 NOTE — PROGRESS NOTES
Assumed care of pt at 0815. Report received and bedside rounding completed with night RN. Pt is calm no SOB, or in any acute distress noted.     Fall precautions in place,  bed alarm. - Treaded non slip socks. Bed locked. Communication board updated with POC. Call light and pt belongings within reach - hourly rounding in place.   Tele-Sitter in place

## 2021-06-25 NOTE — THERAPY
"Occupational Therapy  Daily Treatment     Patient Name: Kristin Balderrama  Age:  30 y.o., Sex:  female  Medical Record #: [id number]  Today's Date: 6/25/2021     Precautions  Precautions: (P) Fall Risk  Comments: (P) schizophrenia, borderline personality disorder, polypharmacy, seizures    Assessment    Pt seen for follow up OT tx session, pt making steady progress in therapy seems to be close to baseline for functional mobility will attempt adaptive equipment/adaptive strategies in future sessions to address ADL participation as able. Continue to recommend post-acute placement.    Plan    Continue current treatment plan.    DC Equipment Recommendations: (P) Unable to determine at this time  Discharge Recommendations: (P) Recommend post-acute placement for additional occupational therapy services prior to discharge home    Subjective    \"I need those foot drop boots my heels hurt\"     Objective       06/25/21 1015   Precautions   Precautions Fall Risk   Comments schizophrenia, borderline personality disorder, polypharmacy, seizures   Pain 0 - 10 Group   Therapist Pain Assessment Post Activity Pain Same as Prior to Activity;Nurse Notified   Non Verbal Descriptors   Non Verbal Scale  Calm   Cognition    Cognition / Consciousness WDL   Level of Consciousness Alert   Comments Pleasant, cooperative, concerned about heel pain   Active ROM Upper Body   Active ROM Upper Body  WDL   Dominant Hand Right   Strength Upper Body   Upper Body Strength  WDL   Balance   Sitting Balance (Static) Fair +   Sitting Balance (Dynamic) Fair   Weight Shift Sitting Fair   Skilled Intervention Verbal Cuing   Bed Mobility    Supine to Sit Minimal Assist   Scooting Supervised   Rolling Supervised   Activities of Daily Living   Grooming Supervision;Seated   Upper Body Dressing Supervision   Lower Body Dressing Maximal Assist   Toileting Total Assist  (purewick in place)   Skilled Intervention Verbal Cuing   How much help from another person does " the patient currently need...   Putting on and taking off regular lower body clothing? 2   Bathing (including washing, rinsing, and drying)? 2   Toileting, which includes using a toilet, bedpan, or urinal? 2   Putting on and taking off regular upper body clothing? 3   Taking care of personal grooming such as brushing teeth? 3   Eating meals? 3   6 Clicks Daily Activity Score 15   Functional Mobility   Sit to Stand Unable to Participate   Bed, Chair, Wheelchair Transfer Minimal Assist   Transfer Method Slide Board   Mobility bed mobility, transfer to bedside chair   Skilled Intervention Verbal Cuing   Activity Tolerance   Sitting in Chair 10 then left seated in chair   Sitting Edge of Bed 10   Patient / Family Goals   Patient / Family Goal #1 To get stronger   Goal #1 Outcome Progressing as expected   Short Term Goals   Short Term Goal # 1 Pt will complete ADL transfers with supervision   Goal Outcome # 1 Progressing as expected   Short Term Goal # 2 Pt will complete LB dressing with supervision AE PRN   Goal Outcome # 2 Goal not met   Short Term Goal # 3 Pt will complete toileting with supervision   Goal Outcome # 3 Goal not met   Education Group   Education Provided Role of Occupational Therapist   Role of Occupational Therapist Patient Response Patient;Acceptance;Explanation   Interdisciplinary Plan of Care Collaboration   IDT Collaboration with  Nursing   Patient Position at End of Therapy Seated;Chair Alarm On;Call Light within Reach;Tray Table within Reach;Phone within Reach   Collaboration Comments RN updated

## 2021-06-25 NOTE — PROGRESS NOTES
4 Eyes Skin Assessment Completed by SONYA Alicea and SONYA Zepeda.    Head WDL  Ears WDL  Nose WDL  Mouth WDL  Neck WDL  Breast/Chest Blanching  Shoulder Blades WDL  Spine Redness and Blanching  (R) Arm/Elbow/Hand WDL  (L) Arm/Elbow/Hand WDL  Abdomen WDL  Groin Redness  Scrotum/Coccyx/Buttocks WDL  (R) Leg WDL  (L) Leg WDL  (R) Heel/Foot/Toe WDL callus around heel   (L) Heel/Foot/Toe WDL callus around heel           Devices In Places Pulse Ox and SCD's female pure wick in place       Interventions In Place Heel Mepilex, Waffle Overlay, Pillows, Q2 Turns, Barrier Cream, Heels Loaded W/Pillows and Pressure Redistribution Mattress    Possible Skin Injury No    Pictures Uploaded Into Epic N/A  Wound Consult Placed N/A  RN Wound Prevention Protocol Ordered No

## 2021-06-25 NOTE — PROGRESS NOTES
"Hospital Medicine Daily Progress Note    Date of Service  6/25/2021    Chief Complaint  30 y.o. female admitted 6/20/2021 with fall    Hospital Course  30-year-old female with a history of schizophrenia, borderline personality disorder, morbid obesity, GERD, h/o optic neuritis, seizures, chronic headaches, polypharmacy and multiple readmissions admitted with fall at home during transfer and unable to care for herself. CT spine negative for fracture.  Pt with rapid response 6/24 for left sided hemiplegia, CT head, CTA head and neck unremarkable. She was given 10% of tpa dose she asked if she was receiving the medication, when she was told that she was in she began moving her extremities and shortly there after stopped moving them again. Her tpa was discontinued given concern of psychogenic etiology of symptoms.        Interval Problem Update  SHARA overnight  Complaining of back pain which is greater than 10 out of 10.  Patient currently looks very comfortable sitting in her chair but states her pain is excruciating and she is not able to tolerate this ever since her \"botched back surgery\".  She states that the 50 mg of tramadol she fell asleep for 4 hours.  We will try 25 mg of tramadol every 12 hours as needed.  Patient agreeable to this plan  Having some dysuria  Pending placement   Patient is medically cleared for discharge from my perspective pending social work coordination for safe discharge    Consultants/Specialty  Neurology  Psychiatry   Behavioral health    Code Status  Full Code    Disposition  accepted to [facility] pending bed availability  Will need long term SNF, group home, vs group home, would NOT send back home as patient has proven she cannot take care of herself and her 85 year old grandmother also cannot take care of patient.    Review of Systems  Review of Systems   Constitutional: Positive for malaise/fatigue. Negative for chills and fever.   HENT: Negative for hearing loss.    Eyes: Negative for " blurred vision, double vision and pain.   Respiratory: Negative for cough and shortness of breath.    Cardiovascular: Negative for chest pain, palpitations and orthopnea.   Gastrointestinal: Negative for nausea and vomiting.   Genitourinary: Positive for dysuria. Negative for hematuria.   Musculoskeletal: Positive for back pain and falls.   Skin: Negative for itching and rash.   Neurological: Positive for weakness.        Physical Exam  Temp:  [35.9 °C (96.6 °F)-37.1 °C (98.8 °F)] 35.9 °C (96.6 °F)  Pulse:  [74-98] 74  Resp:  [16-18] 16  BP: (100-121)/(53-68) 102/68  SpO2:  [91 %-95 %] 94 %    Physical Exam  Vitals and nursing note reviewed.   Constitutional:       General: She is not in acute distress.     Appearance: She is obese. She is not toxic-appearing or diaphoretic.   HENT:      Head: Normocephalic and atraumatic.   Pulmonary:      Effort: Pulmonary effort is normal.      Breath sounds: Normal breath sounds.   Abdominal:      General: Bowel sounds are normal. There is no distension.      Palpations: Abdomen is soft.   Neurological:      Mental Status: She is alert and oriented to person, place, and time. Mental status is at baseline.   Psychiatric:         Mood and Affect: Mood normal.         Behavior: Behavior normal.         Fluids  No intake or output data in the 24 hours ending 06/25/21 0816    Laboratory  Recent Labs     06/23/21  1035   WBC 4.8   RBC 4.80   HEMOGLOBIN 14.0   HEMATOCRIT 42.9   MCV 89.4   MCH 29.2   MCHC 32.6*   RDW 44.7   PLATELETCT 181   MPV 11.9     Recent Labs     06/23/21  1035   SODIUM 138   POTASSIUM 4.0   CHLORIDE 109   CO2 17*   GLUCOSE 122*   BUN 10   CREATININE 0.70   CALCIUM 9.3                   Imaging  CT-CTA NECK WITH & W/O-POST PROCESSING   Final Result      1. No evidence of flow-limiting stenosis in the cervical carotid or cervical vertebral arteries.   2. Enlargement of main pulmonary artery, similar to prior could be seen with pulmonary hypertension.       CT-CEREBRAL PERFUSION ANALYSIS   Final Result      1.  Cerebral blood flow less than 30% likely representing completed infarct = 0 mL.      2.  T Max more than 6 seconds likely representing combination of completed infarct and ischemia = 0 mL.      3.  Mismatched volume likely representing ischemic brain/penumbra = None      4.  Please note that the cerebral perfusion was performed on the limited brain tissue around the basal ganglia region. Infarct/ischemia outside the CT perfusion sections can be missed in this study.      CT-CTA HEAD WITH & W/O-POST PROCESS   Final Result         1. No hemodynamically significant narrowing of the major intracranial vessels.      CT-HEAD W/O   Final Result         1. No acute intracranial abnormality. No evidence of acute intracranial hemorrhage or mass lesion.               DX-CHEST-PORTABLE (1 VIEW)   Final Result      No acute cardiac or pulmonary abnormality is noted.      [id number] VIEW   Final Result      Above average stool volume      Hepatomegaly      CT-LSPINE W/O PLUS RECONS   Final Result      1.  No acute fracture is identified      2.  Status post anterior fusion L4-5.      3.  Right nephrolithiasis           Assessment/Plan  Psychogenic disorder  Assessment & Plan  Complained of left-sided hemiplegia on 6/20 4 AM.  Negative CT head and CTA of head and neck  Neurology had been involved in TPA has started to be administered however after a few minutes patient started moving her arms without any issues so TPA was discontinued after 10% was administered  Patient seems to be complaining of a variety of symptoms including diplopia, chest pain, and other neurological symptoms.  I think there may be a very heavy psychogenic component to this  I have consulted psychiatry    Diplopia  Assessment & Plan  Unclear etiology  Began shortly after chest pain  Resolved on its own    Failure to thrive in adult  Assessment & Plan  Multiple re-admissions, falls at home, limited  support (85 year old grandmother who can't take care of patient)  Needs long term care    Seizures (HCC)  Assessment & Plan  Continue home AEDs: topamax, trileptal    Schizoaffective disorder, depressive type (HCC)- (present on admission)  Assessment & Plan  Follow-up behavioral health consult,   Recurrent ER presentations concerning for failure to thrive in current residential setting.    Hypokalemia- (present on admission)  Assessment & Plan  S/p supplementation    Optic neuritis- (present on admission)  Assessment & Plan  Continue home cellcept    Muscular deconditioning  Assessment & Plan  Recently seen by PT/OT who recommended SNF, below baseline    Hypothyroidism- (present on admission)  Assessment & Plan  Continue home synthroid, recent TSH 2 weeks ago normal    Polypharmacy- (present on admission)  Assessment & Plan  Holding lyrica, BB, ivabradine, trazodone, muscle relaxers   oxycodone reduced to daily PRN    GERD (gastroesophageal reflux disease)- (present on admission)  Assessment & Plan  Continue home PPI    Anxiety- (present on admission)  Assessment & Plan  Continue home psych meds: prozac, buspar       VTE prophylaxis: Lovenox

## 2021-06-26 PROCEDURE — 700102 HCHG RX REV CODE 250 W/ 637 OVERRIDE(OP): Performed by: HOSPITALIST

## 2021-06-26 PROCEDURE — 99231 SBSQ HOSP IP/OBS SF/LOW 25: CPT | Performed by: HOSPITALIST

## 2021-06-26 PROCEDURE — 700101 HCHG RX REV CODE 250: Performed by: INTERNAL MEDICINE

## 2021-06-26 PROCEDURE — 700102 HCHG RX REV CODE 250 W/ 637 OVERRIDE(OP): Performed by: PSYCHIATRY & NEUROLOGY

## 2021-06-26 PROCEDURE — 700102 HCHG RX REV CODE 250 W/ 637 OVERRIDE(OP): Performed by: INTERNAL MEDICINE

## 2021-06-26 PROCEDURE — 770006 HCHG ROOM/CARE - MED/SURG/GYN SEMI*

## 2021-06-26 PROCEDURE — A9270 NON-COVERED ITEM OR SERVICE: HCPCS | Performed by: PSYCHIATRY & NEUROLOGY

## 2021-06-26 PROCEDURE — A9270 NON-COVERED ITEM OR SERVICE: HCPCS | Performed by: STUDENT IN AN ORGANIZED HEALTH CARE EDUCATION/TRAINING PROGRAM

## 2021-06-26 PROCEDURE — 700111 HCHG RX REV CODE 636 W/ 250 OVERRIDE (IP): Performed by: INTERNAL MEDICINE

## 2021-06-26 PROCEDURE — A9270 NON-COVERED ITEM OR SERVICE: HCPCS | Performed by: HOSPITALIST

## 2021-06-26 PROCEDURE — 700102 HCHG RX REV CODE 250 W/ 637 OVERRIDE(OP): Performed by: STUDENT IN AN ORGANIZED HEALTH CARE EDUCATION/TRAINING PROGRAM

## 2021-06-26 PROCEDURE — A9270 NON-COVERED ITEM OR SERVICE: HCPCS | Performed by: INTERNAL MEDICINE

## 2021-06-26 RX ADMIN — DOCUSATE SODIUM 50 MG AND SENNOSIDES 8.6 MG 2 TABLET: 8.6; 5 TABLET, FILM COATED ORAL at 16:50

## 2021-06-26 RX ADMIN — LIDOCAINE 1 PATCH: 50 PATCH TOPICAL at 10:58

## 2021-06-26 RX ADMIN — MAGNESIUM HYDROXIDE 30 ML: 400 SUSPENSION ORAL at 16:58

## 2021-06-26 RX ADMIN — ASPIRIN 81 MG: 81 TABLET, COATED ORAL at 04:47

## 2021-06-26 RX ADMIN — NITROFURANTOIN MONOHYDRATE/MACROCRYSTALLINE 100 MG: 25; 75 CAPSULE ORAL at 16:49

## 2021-06-26 RX ADMIN — POLYETHYLENE GLYCOL 3350 1 PACKET: 17 POWDER, FOR SOLUTION ORAL at 04:46

## 2021-06-26 RX ADMIN — TRAMADOL HYDROCHLORIDE 25 MG: 50 TABLET, FILM COATED ORAL at 00:04

## 2021-06-26 RX ADMIN — BUSPIRONE HYDROCHLORIDE 30 MG: 30 TABLET ORAL at 04:46

## 2021-06-26 RX ADMIN — BACLOFEN 10 MG: 10 TABLET ORAL at 16:49

## 2021-06-26 RX ADMIN — BACLOFEN 10 MG: 10 TABLET ORAL at 10:58

## 2021-06-26 RX ADMIN — IBUPROFEN 400 MG: 400 TABLET, FILM COATED ORAL at 08:01

## 2021-06-26 RX ADMIN — ZIPRASIDONE HYDROCHLORIDE 40 MG: 40 CAPSULE ORAL at 16:48

## 2021-06-26 RX ADMIN — TOPIRAMATE 50 MG: 25 TABLET, FILM COATED ORAL at 16:49

## 2021-06-26 RX ADMIN — OXCARBAZEPINE 300 MG: 300 TABLET, FILM COATED ORAL at 16:50

## 2021-06-26 RX ADMIN — PREGABALIN 300 MG: 150 CAPSULE ORAL at 16:49

## 2021-06-26 RX ADMIN — PREGABALIN 300 MG: 150 CAPSULE ORAL at 04:46

## 2021-06-26 RX ADMIN — OMEPRAZOLE 40 MG: 20 CAPSULE, DELAYED RELEASE ORAL at 04:48

## 2021-06-26 RX ADMIN — DOCUSATE SODIUM 50 MG AND SENNOSIDES 8.6 MG 2 TABLET: 8.6; 5 TABLET, FILM COATED ORAL at 04:47

## 2021-06-26 RX ADMIN — ONDANSETRON 4 MG: 2 INJECTION INTRAMUSCULAR; INTRAVENOUS at 14:14

## 2021-06-26 RX ADMIN — OXYCODONE 5 MG: 5 TABLET ORAL at 10:58

## 2021-06-26 RX ADMIN — OXYBUTYNIN CHLORIDE 5 MG: 5 TABLET ORAL at 04:47

## 2021-06-26 RX ADMIN — OXYBUTYNIN CHLORIDE 5 MG: 5 TABLET ORAL at 16:50

## 2021-06-26 RX ADMIN — OXCARBAZEPINE 300 MG: 300 TABLET, FILM COATED ORAL at 04:48

## 2021-06-26 RX ADMIN — NITROFURANTOIN MONOHYDRATE/MACROCRYSTALLINE 100 MG: 25; 75 CAPSULE ORAL at 08:01

## 2021-06-26 RX ADMIN — MYCOPHENOLATE MOFETIL 1000 MG: 250 CAPSULE ORAL at 16:48

## 2021-06-26 RX ADMIN — TRAMADOL HYDROCHLORIDE 25 MG: 50 TABLET, FILM COATED ORAL at 12:38

## 2021-06-26 RX ADMIN — NYSTATIN OINTMENT: 100000 OINTMENT TOPICAL at 04:48

## 2021-06-26 RX ADMIN — ZIPRASIDONE HYDROCHLORIDE 40 MG: 40 CAPSULE ORAL at 06:19

## 2021-06-26 RX ADMIN — LEVOTHYROXINE SODIUM 100 MCG: 0.1 TABLET ORAL at 04:47

## 2021-06-26 RX ADMIN — BACLOFEN 10 MG: 10 TABLET ORAL at 04:48

## 2021-06-26 RX ADMIN — TOPIRAMATE 50 MG: 25 TABLET, FILM COATED ORAL at 04:47

## 2021-06-26 RX ADMIN — MYCOPHENOLATE MOFETIL 1000 MG: 250 CAPSULE ORAL at 06:19

## 2021-06-26 RX ADMIN — FLUOXETINE 60 MG: 20 CAPSULE ORAL at 04:47

## 2021-06-26 RX ADMIN — ONDANSETRON 4 MG: 2 INJECTION INTRAMUSCULAR; INTRAVENOUS at 20:33

## 2021-06-26 RX ADMIN — FERROUS SULFATE TAB 325 MG (65 MG ELEMENTAL FE) 325 MG: 325 (65 FE) TAB at 08:01

## 2021-06-26 RX ADMIN — Medication 10 MG: at 20:17

## 2021-06-26 RX ADMIN — BUSPIRONE HYDROCHLORIDE 30 MG: 30 TABLET ORAL at 16:49

## 2021-06-26 RX ADMIN — NYSTATIN OINTMENT: 100000 OINTMENT TOPICAL at 16:48

## 2021-06-26 ASSESSMENT — PAIN DESCRIPTION - PAIN TYPE
TYPE: CHRONIC PAIN
TYPE: ACUTE PAIN
TYPE: CHRONIC PAIN
TYPE: ACUTE PAIN

## 2021-06-26 ASSESSMENT — ENCOUNTER SYMPTOMS
DOUBLE VISION: 0
EYE PAIN: 0
NAUSEA: 0
COUGH: 0
ORTHOPNEA: 0
CHILLS: 0
FALLS: 1
VOMITING: 0
BLURRED VISION: 0
WEAKNESS: 1
PALPITATIONS: 0
SHORTNESS OF BREATH: 0
BACK PAIN: 1
FEVER: 0

## 2021-06-26 NOTE — PROGRESS NOTES
Patient is requesting to speak with CM regarding discharge planning, notified Mayela RUIZ. SARA to request another CM to discuss concerns with patient tomorrow.

## 2021-06-26 NOTE — PROGRESS NOTES
4 Eyes Skin Assessment Completed by SONYA Rawls and SONYA Tinajero.    Head WDL  Ears WDL  Nose WDL  Mouth Redness  Neck WDL  Breast/Chest WDL  Shoulder Blades WDL  Spine WDL  (R) Arm/Elbow/Hand WDL  (L) Arm/Elbow/Hand WDL  Abdomen Bruising  Groin Incision  Scrotum/Coccyx/Buttocks WDL  (R) Leg WDL  (L) Leg Incision  (R) Heel/Foot/Toe WDL with heel callus  (L) Heel/Foot/Toe WDL with heel callus    Devices In Places Pulse Ox, PIV      Interventions In Place Heel Mepilex, Sacral Mepilex, Waffle Overlay, Pillows, Q2 Turns and Heels Loaded W/Pillows    Possible Skin Injury No    Pictures Uploaded Into Epic N/A  Wound Consult Placed N/A  RN Wound Prevention Protocol Ordered No

## 2021-06-26 NOTE — PROGRESS NOTES
4 Eyes Skin Assessment Completed by Leona RN, and Mirian RN     Head: WDL  Ears: WDL  Nose: WDL  Mouth: Redness  Neck: WDL  Breast/Chest: Blanching  Shoulder Blade:s WDL  Spine: WDL  (R) Arm/Elbow/Hand: WDL  (L) Arm/Elbow/Hand: WDL  Abdomen: Bruising  Groin: WDL  Scrotum/Coccyx/Buttocks: WDL  (R) Leg: WDL  (L) Leg: WDL  (R) Heel/Foot/Toe: WDL with callus around heel         (L) Heel/Foot/Toe: WDL with callus areas around heel     Devices in place: pulse ox, SCD's     Heel mepilexes applied, waffle mattress overlay, q2h turns, barrier cream, pillows in use.     Possible Skin Injury: No     Pictures Uploaded Into Epic N/A  Wound Consult Placed N/A  RN Wound Prevention Protocol Ordered: No

## 2021-06-26 NOTE — PROGRESS NOTES
Hospital Medicine Daily Progress Note    Date of Service  6/26/2021    Chief Complaint  30 y.o. female admitted 6/20/2021 with fall    Hospital Course  30-year-old female with a history of schizophrenia, borderline personality disorder, morbid obesity, GERD, h/o optic neuritis, seizures, chronic headaches, polypharmacy and multiple readmissions admitted with fall at home during transfer and unable to care for herself. CT spine negative for fracture.  Pt with rapid response 6/24 for left sided hemiplegia, CT head, CTA head and neck unremarkable. She was given 10% of tpa dose she asked if she was receiving the medication, when she was told that she was in she began moving her extremities and shortly there after stopped moving them again. Her tpa was discontinued given concern of psychogenic etiology of symptoms.        Interval Problem Update  SHARA overnight  Pt resting comfortably w/o any new complaints    Consultants/Specialty  Neurology  Psychiatry   Behavioral health    Code Status  Full Code    Disposition  accepted to [facility] pending bed availability  Will need long term SNF, [facility], vs group home, would NOT send back home as patient has proven she cannot take care of herself and her 85 year old grandmother also cannot take care of patient.    Review of Systems  Review of Systems   Constitutional: Positive for malaise/fatigue. Negative for chills and fever.   HENT: Negative for hearing loss.    Eyes: Negative for blurred vision, double vision and pain.   Respiratory: Negative for cough and shortness of breath.    Cardiovascular: Negative for chest pain, palpitations and orthopnea.   Gastrointestinal: Negative for nausea and vomiting.   Genitourinary: Positive for dysuria. Negative for hematuria.   Musculoskeletal: Positive for back pain and falls.   Skin: Negative for itching and rash.   Neurological: Positive for weakness.        Physical Exam  Temp:  [36.1 °C (96.9 °F)-36.8 °C (98.3 °F)] 36.8 °C (98.3  °F)  Pulse:  [70-85] 70  Resp:  [16-18] 16  BP: (109-115)/(51-70) 109/51  SpO2:  [94 %-97 %] 96 %    Physical Exam  Vitals and nursing note reviewed.   Constitutional:       General: She is not in acute distress.     Appearance: She is obese. She is not toxic-appearing or diaphoretic.   HENT:      Head: Normocephalic and atraumatic.   Pulmonary:      Effort: Pulmonary effort is normal.      Breath sounds: Normal breath sounds.   Abdominal:      General: Bowel sounds are normal. There is no distension.      Palpations: Abdomen is soft.   Neurological:      Mental Status: She is alert and oriented to person, place, and time. Mental status is at baseline.   Psychiatric:         Mood and Affect: Mood normal.         Behavior: Behavior normal.         Fluids    Intake/Output Summary (Last 24 hours) at 6/26/2021 0922  Last data filed at 6/25/2021 2001  Gross per 24 hour   Intake 120 ml   Output --   Net 120 ml       Laboratory  Recent Labs     06/23/21  1035   WBC 4.8   RBC 4.80   HEMOGLOBIN 14.0   HEMATOCRIT 42.9   MCV 89.4   MCH 29.2   MCHC 32.6*   RDW 44.7   PLATELETCT 181   MPV 11.9     Recent Labs     06/23/21  1035   SODIUM 138   POTASSIUM 4.0   CHLORIDE 109   CO2 17*   GLUCOSE 122*   BUN 10   CREATININE 0.70   CALCIUM 9.3                   Imaging  IR-US GUIDED PIV   Final Result    Ultrasound-guided PERIPHERAL IV INSERTION performed by    qualified nursing staff as above.      CT-CTA NECK WITH & W/O-POST PROCESSING   Final Result      1. No evidence of flow-limiting stenosis in the cervical carotid or cervical vertebral arteries.   2. Enlargement of main pulmonary artery, similar to prior could be seen with pulmonary hypertension.      CT-CEREBRAL PERFUSION ANALYSIS   Final Result      1.  Cerebral blood flow less than 30% likely representing completed infarct = 0 mL.      2.  T Max more than 6 seconds likely representing combination of completed infarct and ischemia = 0 mL.      3.  Mismatched volume likely  representing ischemic brain/penumbra = None      4.  Please note that the cerebral perfusion was performed on the limited brain tissue around the basal ganglia region. Infarct/ischemia outside the CT perfusion sections can be missed in this study.      CT-CTA HEAD WITH & W/O-POST PROCESS   Final Result         1. No hemodynamically significant narrowing of the major intracranial vessels.      CT-HEAD W/O   Final Result         1. No acute intracranial abnormality. No evidence of acute intracranial hemorrhage or mass lesion.               DX-CHEST-PORTABLE (1 VIEW)   Final Result      No acute cardiac or pulmonary abnormality is noted.      [id number] VIEW   Final Result      Above average stool volume      Hepatomegaly      CT-LSPINE W/O PLUS RECONS   Final Result      1.  No acute fracture is identified      2.  Status post anterior fusion L4-5.      3.  Right nephrolithiasis           Assessment/Plan  Psychogenic disorder  Assessment & Plan  Complained of left-sided hemiplegia on 6/20 4 AM.  Negative CT head and CTA of head and neck  Neurology had been involved in TPA has started to be administered however after a few minutes patient started moving her arms without any issues so TPA was discontinued after 10% was administered  Patient seems to be complaining of a variety of symptoms including diplopia, chest pain, and other neurological symptoms.  I think there may be a very heavy psychogenic component to this  I have consulted psychiatry    Diplopia  Assessment & Plan  Unclear etiology  Began shortly after chest pain  Resolved on its own    Failure to thrive in adult  Assessment & Plan  Multiple re-admissions, falls at home, limited support (85 year old grandmother who can't take care of patient)  Needs long term care    Seizures (HCC)  Assessment & Plan  Continue home AEDs: topamax, trileptal    Schizoaffective disorder, depressive type (HCC)- (present on admission)  Assessment & Plan  Follow-up behavioral  health consult,   Recurrent ER presentations concerning for failure to thrive in current residential setting.    Hypokalemia- (present on admission)  Assessment & Plan  S/p supplementation    Optic neuritis- (present on admission)  Assessment & Plan  Continue home cellcept    Muscular deconditioning  Assessment & Plan  Recently seen by PT/OT who recommended SNF, below baseline    Hypothyroidism- (present on admission)  Assessment & Plan  Continue home synthroid, recent TSH 2 weeks ago normal    Polypharmacy- (present on admission)  Assessment & Plan  Holding lyrica, BB, ivabradine, trazodone, muscle relaxers   oxycodone reduced to daily PRN    GERD (gastroesophageal reflux disease)- (present on admission)  Assessment & Plan  Continue home PPI    Anxiety- (present on admission)  Assessment & Plan  Continue home psych meds: prozac, buspar       VTE prophylaxis: Lovenox

## 2021-06-26 NOTE — CARE PLAN
Problem: Pain - Standard  Goal: Alleviation of pain or a reduction in pain to the patient’s comfort goal  Outcome: Progressing  Note: Administering pain mediations as ordered (see MAR). Providing patient with heat packs, ice packs and repositioning as needed.     Problem: Knowledge Deficit - Standard  Goal: Patient and family/care givers will demonstrate understanding of plan of care, disease process/condition, diagnostic tests and medications  Outcome: Progressing  Note: Discussed POC with patient; pain management, patient safety, working with PT/OT, assissting in S4dwytv to prevent skin breakdown, increasing mobilization as tolerated, and discharge planning. Patient verbalized understanding and is agreeable to POC. Encouraging pt to voice questions and concerns. Oriented pt to use of call light. Patient is capable of making needs known.      Problem: Fall Risk  Goal: Patient will remain free from falls  Outcome: Progressing  Note: Call light/belongings in reach, bed in low position and locked, front wheel walker out of sight, siderails up x 2, pt wearing non-slip footwear, adequate lighting, clutter free environment, bed alarm on, telesitter in place, patients room near nurses station, hourly rounding in place.. Educate on level of fall risk, oriented to use of call light and encouraged pt to call before attempting to get out of bed.      Problem: Skin Integrity  Goal: Skin integrity is maintained or improved  Outcome: Progressing     The patient is Stable - Low risk of patient condition declining or worsening

## 2021-06-26 NOTE — PROGRESS NOTES
"[id number] Notified Lesia DÍAZ regarding patients request for \"azo\" and adjusting pain medication orders. MD to place orders.  "

## 2021-06-26 NOTE — CARE PLAN
Problem: Pain - Standard  Goal: Alleviation of pain or a reduction in pain to the patient’s comfort goal  Outcome: Progressing   Pt pain controlled with PRN Oxy and Tramadol. Other comfort measures, such as repositioning, offered.    Problem: Fall Risk  Goal: Patient will remain free from falls  Outcome: Progressing  Pt has had no falls/injuries this shift. Telesitter in place. Hourly rounding performed.

## 2021-06-27 LAB
BACTERIA UR CULT: NORMAL
SIGNIFICANT IND 70042: NORMAL
SITE SITE: NORMAL
SOURCE SOURCE: NORMAL

## 2021-06-27 PROCEDURE — 700102 HCHG RX REV CODE 250 W/ 637 OVERRIDE(OP): Performed by: STUDENT IN AN ORGANIZED HEALTH CARE EDUCATION/TRAINING PROGRAM

## 2021-06-27 PROCEDURE — 700102 HCHG RX REV CODE 250 W/ 637 OVERRIDE(OP): Performed by: INTERNAL MEDICINE

## 2021-06-27 PROCEDURE — 51798 US URINE CAPACITY MEASURE: CPT

## 2021-06-27 PROCEDURE — 99233 SBSQ HOSP IP/OBS HIGH 50: CPT | Performed by: HOSPITALIST

## 2021-06-27 PROCEDURE — 700102 HCHG RX REV CODE 250 W/ 637 OVERRIDE(OP): Performed by: PSYCHIATRY & NEUROLOGY

## 2021-06-27 PROCEDURE — A9270 NON-COVERED ITEM OR SERVICE: HCPCS | Performed by: PSYCHIATRY & NEUROLOGY

## 2021-06-27 PROCEDURE — 700102 HCHG RX REV CODE 250 W/ 637 OVERRIDE(OP): Performed by: HOSPITALIST

## 2021-06-27 PROCEDURE — 700111 HCHG RX REV CODE 636 W/ 250 OVERRIDE (IP): Performed by: INTERNAL MEDICINE

## 2021-06-27 PROCEDURE — A9270 NON-COVERED ITEM OR SERVICE: HCPCS | Performed by: HOSPITALIST

## 2021-06-27 PROCEDURE — 770001 HCHG ROOM/CARE - MED/SURG/GYN PRIV*

## 2021-06-27 PROCEDURE — 700101 HCHG RX REV CODE 250: Performed by: INTERNAL MEDICINE

## 2021-06-27 PROCEDURE — A9270 NON-COVERED ITEM OR SERVICE: HCPCS | Performed by: INTERNAL MEDICINE

## 2021-06-27 PROCEDURE — A9270 NON-COVERED ITEM OR SERVICE: HCPCS | Performed by: STUDENT IN AN ORGANIZED HEALTH CARE EDUCATION/TRAINING PROGRAM

## 2021-06-27 RX ORDER — PHENAZOPYRIDINE HYDROCHLORIDE 100 MG/1
100 TABLET, FILM COATED ORAL
Status: COMPLETED | OUTPATIENT
Start: 2021-06-27 | End: 2021-06-28

## 2021-06-27 RX ADMIN — BACLOFEN 10 MG: 10 TABLET ORAL at 12:31

## 2021-06-27 RX ADMIN — NYSTATIN OINTMENT: 100000 OINTMENT TOPICAL at 04:26

## 2021-06-27 RX ADMIN — PHENAZOPYRIDINE HYDROCHLORIDE 100 MG: 100 TABLET ORAL at 12:31

## 2021-06-27 RX ADMIN — TOPIRAMATE 50 MG: 25 TABLET, FILM COATED ORAL at 04:33

## 2021-06-27 RX ADMIN — NYSTATIN OINTMENT: 100000 OINTMENT TOPICAL at 17:50

## 2021-06-27 RX ADMIN — BACLOFEN 10 MG: 10 TABLET ORAL at 04:25

## 2021-06-27 RX ADMIN — BUSPIRONE HYDROCHLORIDE 30 MG: 30 TABLET ORAL at 04:26

## 2021-06-27 RX ADMIN — FLUOXETINE 60 MG: 20 CAPSULE ORAL at 04:25

## 2021-06-27 RX ADMIN — LEVOTHYROXINE SODIUM 100 MCG: 0.1 TABLET ORAL at 04:25

## 2021-06-27 RX ADMIN — PREGABALIN 300 MG: 150 CAPSULE ORAL at 04:25

## 2021-06-27 RX ADMIN — NITROFURANTOIN MONOHYDRATE/MACROCRYSTALLINE 100 MG: 25; 75 CAPSULE ORAL at 08:28

## 2021-06-27 RX ADMIN — TOPIRAMATE 50 MG: 25 TABLET, FILM COATED ORAL at 17:50

## 2021-06-27 RX ADMIN — BUSPIRONE HYDROCHLORIDE 30 MG: 30 TABLET ORAL at 17:50

## 2021-06-27 RX ADMIN — ACETAMINOPHEN 650 MG: 325 TABLET, FILM COATED ORAL at 20:33

## 2021-06-27 RX ADMIN — FERROUS SULFATE TAB 325 MG (65 MG ELEMENTAL FE) 325 MG: 325 (65 FE) TAB at 08:28

## 2021-06-27 RX ADMIN — ACETAMINOPHEN 650 MG: 325 TABLET, FILM COATED ORAL at 09:10

## 2021-06-27 RX ADMIN — OMEPRAZOLE 40 MG: 20 CAPSULE, DELAYED RELEASE ORAL at 04:26

## 2021-06-27 RX ADMIN — OXCARBAZEPINE 300 MG: 300 TABLET, FILM COATED ORAL at 04:25

## 2021-06-27 RX ADMIN — TRAMADOL HYDROCHLORIDE 25 MG: 50 TABLET, FILM COATED ORAL at 13:34

## 2021-06-27 RX ADMIN — PREGABALIN 300 MG: 150 CAPSULE ORAL at 17:50

## 2021-06-27 RX ADMIN — Medication 10 MG: at 20:33

## 2021-06-27 RX ADMIN — NITROFURANTOIN MONOHYDRATE/MACROCRYSTALLINE 100 MG: 25; 75 CAPSULE ORAL at 17:49

## 2021-06-27 RX ADMIN — ASPIRIN 81 MG: 81 TABLET, COATED ORAL at 04:26

## 2021-06-27 RX ADMIN — DOCUSATE SODIUM 50 MG AND SENNOSIDES 8.6 MG 2 TABLET: 8.6; 5 TABLET, FILM COATED ORAL at 04:25

## 2021-06-27 RX ADMIN — OXYBUTYNIN CHLORIDE 5 MG: 5 TABLET ORAL at 04:25

## 2021-06-27 RX ADMIN — ZIPRASIDONE HYDROCHLORIDE 40 MG: 40 CAPSULE ORAL at 04:33

## 2021-06-27 RX ADMIN — BACLOFEN 10 MG: 10 TABLET ORAL at 17:49

## 2021-06-27 RX ADMIN — LIDOCAINE 1 PATCH: 50 PATCH TOPICAL at 08:29

## 2021-06-27 RX ADMIN — OXYCODONE 5 MG: 5 TABLET ORAL at 10:54

## 2021-06-27 RX ADMIN — ZIPRASIDONE HYDROCHLORIDE 40 MG: 40 CAPSULE ORAL at 17:50

## 2021-06-27 RX ADMIN — OXCARBAZEPINE 300 MG: 300 TABLET, FILM COATED ORAL at 17:49

## 2021-06-27 RX ADMIN — TRAMADOL HYDROCHLORIDE 25 MG: 50 TABLET, FILM COATED ORAL at 01:39

## 2021-06-27 RX ADMIN — MYCOPHENOLATE MOFETIL 1000 MG: 250 CAPSULE ORAL at 04:25

## 2021-06-27 RX ADMIN — POLYETHYLENE GLYCOL 3350 1 PACKET: 17 POWDER, FOR SOLUTION ORAL at 04:29

## 2021-06-27 RX ADMIN — OXYBUTYNIN CHLORIDE 5 MG: 5 TABLET ORAL at 17:49

## 2021-06-27 RX ADMIN — ONDANSETRON 4 MG: 2 INJECTION INTRAMUSCULAR; INTRAVENOUS at 13:34

## 2021-06-27 RX ADMIN — PHENAZOPYRIDINE HYDROCHLORIDE 100 MG: 100 TABLET ORAL at 17:49

## 2021-06-27 ASSESSMENT — PAIN DESCRIPTION - PAIN TYPE
TYPE: CHRONIC PAIN
TYPE: ACUTE PAIN
TYPE: CHRONIC PAIN
TYPE: ACUTE PAIN
TYPE: CHRONIC PAIN

## 2021-06-27 ASSESSMENT — ENCOUNTER SYMPTOMS
COUGH: 0
SHORTNESS OF BREATH: 0
BACK PAIN: 1
BLURRED VISION: 0
FALLS: 1
CHILLS: 0
PALPITATIONS: 0
ORTHOPNEA: 0
VOMITING: 0
DOUBLE VISION: 0
WEAKNESS: 1
FEVER: 0
EYE PAIN: 0
NAUSEA: 0

## 2021-06-27 NOTE — PROGRESS NOTES
Hospital Medicine Daily Progress Note    Date of Service  6/27/2021    Chief Complaint  30 y.o. female admitted 6/20/2021 with fall    Hospital Course  30-year-old female with a history of schizophrenia, borderline personality disorder, morbid obesity, GERD, h/o optic neuritis, seizures, chronic headaches, polypharmacy and multiple readmissions admitted with fall at home during transfer and unable to care for herself. CT spine negative for fracture.  Pt with rapid response 6/24 for left sided hemiplegia, CT head, CTA head and neck unremarkable. She was given 10% of tpa dose she asked if she was receiving the medication, when she was told that she was in she began moving her extremities and shortly there after stopped moving them again. Her tpa was discontinued given concern of psychogenic etiology of symptoms.        Interval Problem Update  SHARA overnight  Urine culture negative.  Will complete 3-day course of Macrobid.  Phenazopyridine ordered per patient request  Patient's mother at bedside and they are wondering when patient will be discharged heart stone.  I informed him that patient has been medically cleared and we are just awaiting bed availability  Patient very pleasant this morning and denying any acute complaints  She states improvement in dysuria.  However episode of urinary retention this morning with 600 cc drained with straight cath.  We will recheck bladder scan and if remains high then will place Andrade catheter    Consultants/Specialty  Neurology  Psychiatry   Behavioral health    Code Status  Full Code    Disposition  accepted to [facility] pending bed availability  Will need long term SNF, [facility], vs group home, would NOT send back home as patient has proven she cannot take care of herself and her 85 year old grandmother also cannot take care of patient.    Review of Systems  Review of Systems   Constitutional: Positive for malaise/fatigue. Negative for chills and fever.   HENT: Negative for hearing  loss.    Eyes: Negative for blurred vision, double vision and pain.   Respiratory: Negative for cough and shortness of breath.    Cardiovascular: Negative for chest pain, palpitations and orthopnea.   Gastrointestinal: Negative for nausea and vomiting.   Genitourinary: Positive for dysuria (Improved). Negative for hematuria.   Musculoskeletal: Positive for back pain and falls.   Skin: Negative for itching and rash.   Neurological: Positive for weakness.        Physical Exam  Temp:  [35.9 °C (96.6 °F)-36.3 °C (97.4 °F)] 36.2 °C (97.2 °F)  Pulse:  [72-82] 82  Resp:  [15-16] 16  BP: (106-119)/(61-67) 106/61  SpO2:  [95 %-98 %] 98 %    Physical Exam  Vitals and nursing note reviewed.   Constitutional:       General: She is not in acute distress.     Appearance: She is obese. She is not toxic-appearing or diaphoretic.   HENT:      Head: Normocephalic and atraumatic.   Pulmonary:      Effort: Pulmonary effort is normal.      Breath sounds: Normal breath sounds.   Abdominal:      General: Bowel sounds are normal. There is no distension.      Palpations: Abdomen is soft.   Neurological:      Mental Status: She is alert and oriented to person, place, and time. Mental status is at baseline.   Psychiatric:         Mood and Affect: Mood normal.         Behavior: Behavior normal.         Fluids    Intake/Output Summary (Last 24 hours) at 6/27/2021 1154  Last data filed at 6/27/2021 1112  Gross per 24 hour   Intake --   Output 600 ml   Net -600 ml       Laboratory                        Imaging  IR-US GUIDED PIV   Final Result    Ultrasound-guided PERIPHERAL IV INSERTION performed by    qualified nursing staff as above.      CT-CTA NECK WITH & W/O-POST PROCESSING   Final Result      1. No evidence of flow-limiting stenosis in the cervical carotid or cervical vertebral arteries.   2. Enlargement of main pulmonary artery, similar to prior could be seen with pulmonary hypertension.      CT-CEREBRAL PERFUSION ANALYSIS   Final Result       1.  Cerebral blood flow less than 30% likely representing completed infarct = 0 mL.      2.  T Max more than 6 seconds likely representing combination of completed infarct and ischemia = 0 mL.      3.  Mismatched volume likely representing ischemic brain/penumbra = None      4.  Please note that the cerebral perfusion was performed on the limited brain tissue around the basal ganglia region. Infarct/ischemia outside the CT perfusion sections can be missed in this study.      CT-CTA HEAD WITH & W/O-POST PROCESS   Final Result         1. No hemodynamically significant narrowing of the major intracranial vessels.      CT-HEAD W/O   Final Result         1. No acute intracranial abnormality. No evidence of acute intracranial hemorrhage or mass lesion.               DX-CHEST-PORTABLE (1 VIEW)   Final Result      No acute cardiac or pulmonary abnormality is noted.      [id number] VIEW   Final Result      Above average stool volume      Hepatomegaly      CT-LSPINE W/O PLUS RECONS   Final Result      1.  No acute fracture is identified      2.  Status post anterior fusion L4-5.      3.  Right nephrolithiasis           Assessment/Plan  Psychogenic disorder  Assessment & Plan  Complained of left-sided hemiplegia on 6/20 4 AM.  Negative CT head and CTA of head and neck  Neurology had been involved in TPA has started to be administered however after a few minutes patient started moving her arms without any issues so TPA was discontinued after 10% was administered  Patient seems to be complaining of a variety of symptoms including diplopia, chest pain, and other neurological symptoms.  I think there may be a very heavy psychogenic component to this  Patient has been seen by psychiatry and behavioral therapy    Diplopia  Assessment & Plan  Unclear etiology  Began shortly after chest pain  Resolved on its own    Failure to thrive in adult  Assessment & Plan  Multiple re-admissions, falls at home, limited support (85 year old  grandmother who can't take care of patient)  Needs long term care    UTI (urinary tract infection)  Assessment & Plan  Patient with dysuria however urine culture negative  Continue Macrobid x3 days  Phenazopyridine ordered    Seizures (HCC)  Assessment & Plan  Continue home AEDs: topamax, trileptal    Schizoaffective disorder, depressive type (HCC)- (present on admission)  Assessment & Plan  Follow-up behavioral health consult,   Recurrent ER presentations concerning for failure to thrive in current residential setting.    Hypokalemia- (present on admission)  Assessment & Plan  S/p supplementation    Optic neuritis- (present on admission)  Assessment & Plan  Continue home cellcept    Muscular deconditioning  Assessment & Plan  Recently seen by PT/OT who recommended SNF, below baseline    Hypothyroidism- (present on admission)  Assessment & Plan  Continue home synthroid, recent TSH 2 weeks ago normal    Polypharmacy- (present on admission)  Assessment & Plan  Holding lyrica, BB, ivabradine, trazodone, muscle relaxers   oxycodone reduced to daily PRN    GERD (gastroesophageal reflux disease)- (present on admission)  Assessment & Plan  Continue home PPI    Anxiety- (present on admission)  Assessment & Plan  Continue home psych meds: prozac, buspar    Urinary retention  Assessment & Plan  Required straight cath on 6/20 7 AM.  If recurs will place Andrade catheter       VTE prophylaxis: Lovenox

## 2021-06-27 NOTE — CARE PLAN
Problem: Pain - Standard  Goal: Alleviation of pain or a reduction in pain to the patient’s comfort goal  Outcome: Progressing     Problem: Knowledge Deficit - Standard  Goal: Patient and family/care givers will demonstrate understanding of plan of care, disease process/condition, diagnostic tests and medications  Outcome: Progressing     Problem: Fall Risk  Goal: Patient will remain free from falls  Outcome: Progressing     Problem: Skin Integrity  Goal: Skin integrity is maintained or improved  Outcome: Progressing     The patient is Stable - Low risk of patient condition declining or worsening    Shift Goals  Clinical Goals: pain control    Progress made toward(s) clinical / shift goals:  PRN pain meds given with some +results.

## 2021-06-27 NOTE — ASSESSMENT & PLAN NOTE
Patient with dysuria however urine culture negative  Continue Macrobid x3 days  Phenazopyridine ordered

## 2021-06-27 NOTE — CARE PLAN
Problem: Pain - Standard  Goal: Alleviation of pain or a reduction in pain to the patient’s comfort goal  Outcome: Progressing     Problem: Knowledge Deficit - Standard  Goal: Patient and family/care givers will demonstrate understanding of plan of care, disease process/condition, diagnostic tests and medications  Outcome: Progressing     Problem: Fall Risk  Goal: Patient will remain free from falls  Outcome: Progressing     Problem: Skin Integrity  Goal: Skin integrity is maintained or improved  Outcome: Progressing       The patient is Stable - Low risk of patient condition declining or worsening    Shift Goals  Clinical Goals: pain control    Progress made toward(s) clinical / shift goals:  medications administered per MAR, fall and skin precautions in place    Patient is not progressing towards the following goals:

## 2021-06-27 NOTE — PROGRESS NOTES
4 Eyes Skin Assessment Completed by SONYA Armstrong and SONYA Emery.    Head WDL  Ears WDL  Nose WDL  Mouth Redness  Neck WDL  Breast/Chest WDL  Shoulder Blades WDL  Spine WDL  (R) Arm/Elbow/Hand WDL  (L) Arm/Elbow/Hand WDL  Abdomen Bruising  Groin WDL  Scrotum/Coccyx/Buttocks WDL  (R) Leg WDL  (L) Leg WDL  (R) Heel/Foot/Toe WDL dry, callous to heel  (L) Heel/Foot/Toe WDL dry, callous to heel          Devices In Places Pulse Ox, PIV      Interventions In Place Heel Mepilex, waffle overlay, pillows for support and positioning, [id number], heels offloaded with pillows    Possible Skin Injury No    Pictures Uploaded Into Epic N/A  Wound Consult Placed N/A  RN Wound Prevention Protocol Ordered No

## 2021-06-27 NOTE — PROGRESS NOTES
Pt aaox4. NEWTON, generalized weakness noted. Pt c/o back pain, tylenol, tramadol and oxy given PRN. +BM, incontinent. Pt unable to void this AM- bladder scanned for 633mL- straight cath'd per order at 1112 for 600mL. Pt incontinent of urine this afternoon. On waffle mattress, pt able to turn self in bed. Mepilex to bilat heels. Reviewed poc with pt-verbalized understanding. Bed alarm in use. Call light in reach. Discussed with pt to call for assitance before getting OOB- pt verbalized understanding. Telesitter in use.

## 2021-06-28 VITALS
BODY MASS INDEX: 41.51 KG/M2 | HEART RATE: 79 BPM | WEIGHT: 249.12 LBS | OXYGEN SATURATION: 96 % | HEIGHT: 65 IN | DIASTOLIC BLOOD PRESSURE: 61 MMHG | SYSTOLIC BLOOD PRESSURE: 100 MMHG | TEMPERATURE: 98.1 F | RESPIRATION RATE: 20 BRPM

## 2021-06-28 PROCEDURE — 99239 HOSP IP/OBS DSCHRG MGMT >30: CPT | Performed by: HOSPITALIST

## 2021-06-28 PROCEDURE — 94760 N-INVAS EAR/PLS OXIMETRY 1: CPT

## 2021-06-28 PROCEDURE — A9270 NON-COVERED ITEM OR SERVICE: HCPCS | Performed by: HOSPITALIST

## 2021-06-28 PROCEDURE — 700102 HCHG RX REV CODE 250 W/ 637 OVERRIDE(OP): Performed by: HOSPITALIST

## 2021-06-28 PROCEDURE — 700111 HCHG RX REV CODE 636 W/ 250 OVERRIDE (IP): Performed by: INTERNAL MEDICINE

## 2021-06-28 PROCEDURE — 700102 HCHG RX REV CODE 250 W/ 637 OVERRIDE(OP): Performed by: INTERNAL MEDICINE

## 2021-06-28 PROCEDURE — A9270 NON-COVERED ITEM OR SERVICE: HCPCS | Performed by: PSYCHIATRY & NEUROLOGY

## 2021-06-28 PROCEDURE — A9270 NON-COVERED ITEM OR SERVICE: HCPCS | Performed by: INTERNAL MEDICINE

## 2021-06-28 PROCEDURE — 700101 HCHG RX REV CODE 250: Performed by: INTERNAL MEDICINE

## 2021-06-28 PROCEDURE — 51798 US URINE CAPACITY MEASURE: CPT

## 2021-06-28 PROCEDURE — 700102 HCHG RX REV CODE 250 W/ 637 OVERRIDE(OP): Performed by: PSYCHIATRY & NEUROLOGY

## 2021-06-28 RX ORDER — NYSTATIN 100000 U/G
1 OINTMENT TOPICAL 2 TIMES DAILY
Status: SHIPPED
Start: 2021-06-28 | End: 2021-07-05

## 2021-06-28 RX ORDER — TRAMADOL HYDROCHLORIDE 50 MG/1
25 TABLET ORAL EVERY 12 HOURS PRN
Qty: 3 TABLET | Refills: 0 | Status: SHIPPED | OUTPATIENT
Start: 2021-06-28 | End: 2021-06-28

## 2021-06-28 RX ORDER — TRAMADOL HYDROCHLORIDE 50 MG/1
25 TABLET ORAL EVERY 12 HOURS PRN
Qty: 3 TABLET | Refills: 0 | Status: SHIPPED | OUTPATIENT
Start: 2021-06-28 | End: 2021-07-01

## 2021-06-28 RX ORDER — OXYCODONE HYDROCHLORIDE 5 MG/1
5 TABLET ORAL
Qty: 3 TABLET | Refills: 0 | Status: SHIPPED | OUTPATIENT
Start: 2021-06-28 | End: 2021-07-01

## 2021-06-28 RX ORDER — IBUPROFEN 800 MG/1
400 TABLET ORAL EVERY 8 HOURS PRN
Qty: 30 TABLET | Refills: 0 | Status: SHIPPED | OUTPATIENT
Start: 2021-06-28 | End: 2021-11-16

## 2021-06-28 RX ORDER — OXYCODONE HYDROCHLORIDE 5 MG/1
5 TABLET ORAL
Qty: 30 TABLET | Refills: 0 | Status: SHIPPED | OUTPATIENT
Start: 2021-06-28 | End: 2021-06-28

## 2021-06-28 RX ORDER — OXYBUTYNIN CHLORIDE 5 MG/1
5 TABLET ORAL 2 TIMES DAILY
Qty: 90 TABLET | Status: SHIPPED
Start: 2021-06-28 | End: 2021-12-01

## 2021-06-28 RX ADMIN — PHENAZOPYRIDINE HYDROCHLORIDE 100 MG: 100 TABLET ORAL at 08:54

## 2021-06-28 RX ADMIN — ZIPRASIDONE HYDROCHLORIDE 40 MG: 40 CAPSULE ORAL at 05:40

## 2021-06-28 RX ADMIN — LIDOCAINE 1 PATCH: 50 PATCH TOPICAL at 11:48

## 2021-06-28 RX ADMIN — OMEPRAZOLE 40 MG: 20 CAPSULE, DELAYED RELEASE ORAL at 05:38

## 2021-06-28 RX ADMIN — MYCOPHENOLATE MOFETIL 1000 MG: 250 CAPSULE ORAL at 01:49

## 2021-06-28 RX ADMIN — ACETAMINOPHEN 650 MG: 325 TABLET, FILM COATED ORAL at 08:54

## 2021-06-28 RX ADMIN — LEVOTHYROXINE SODIUM 100 MCG: 0.1 TABLET ORAL at 05:39

## 2021-06-28 RX ADMIN — BACLOFEN 10 MG: 10 TABLET ORAL at 11:47

## 2021-06-28 RX ADMIN — FERROUS SULFATE TAB 325 MG (65 MG ELEMENTAL FE) 325 MG: 325 (65 FE) TAB at 08:54

## 2021-06-28 RX ADMIN — BACLOFEN 10 MG: 10 TABLET ORAL at 05:39

## 2021-06-28 RX ADMIN — TOPIRAMATE 50 MG: 25 TABLET, FILM COATED ORAL at 05:39

## 2021-06-28 RX ADMIN — OXYBUTYNIN CHLORIDE 5 MG: 5 TABLET ORAL at 05:39

## 2021-06-28 RX ADMIN — BUSPIRONE HYDROCHLORIDE 30 MG: 30 TABLET ORAL at 05:38

## 2021-06-28 RX ADMIN — NITROFURANTOIN MONOHYDRATE/MACROCRYSTALLINE 100 MG: 25; 75 CAPSULE ORAL at 08:54

## 2021-06-28 RX ADMIN — TRAMADOL HYDROCHLORIDE 25 MG: 50 TABLET, FILM COATED ORAL at 01:06

## 2021-06-28 RX ADMIN — OXCARBAZEPINE 300 MG: 300 TABLET, FILM COATED ORAL at 05:39

## 2021-06-28 RX ADMIN — PREGABALIN 300 MG: 150 CAPSULE ORAL at 05:39

## 2021-06-28 RX ADMIN — DOCUSATE SODIUM 50 MG AND SENNOSIDES 8.6 MG 2 TABLET: 8.6; 5 TABLET, FILM COATED ORAL at 05:39

## 2021-06-28 RX ADMIN — TRAMADOL HYDROCHLORIDE 25 MG: 50 TABLET, FILM COATED ORAL at 15:00

## 2021-06-28 RX ADMIN — FLUOXETINE 60 MG: 20 CAPSULE ORAL at 05:39

## 2021-06-28 RX ADMIN — ASPIRIN 81 MG: 81 TABLET, COATED ORAL at 05:39

## 2021-06-28 RX ADMIN — OXYCODONE 5 MG: 5 TABLET ORAL at 11:47

## 2021-06-28 ASSESSMENT — ENCOUNTER SYMPTOMS
FALLS: 0
ORTHOPNEA: 0
EYE PAIN: 0
BLURRED VISION: 0
WEAKNESS: 1
BACK PAIN: 1
VOMITING: 0
FEVER: 0
CHILLS: 0
DOUBLE VISION: 0
COUGH: 0
NAUSEA: 0
PALPITATIONS: 0
SHORTNESS OF BREATH: 0

## 2021-06-28 ASSESSMENT — PAIN DESCRIPTION - PAIN TYPE
TYPE: ACUTE PAIN
TYPE: CHRONIC PAIN
TYPE: SURGICAL PAIN
TYPE: CHRONIC PAIN
TYPE: SURGICAL PAIN

## 2021-06-28 NOTE — DISCHARGE PLANNING
Received Transport Form @ 9368  Spoke to Barb @ [facility]. Auth: [id number].  Spoke to Zander @ [facility].    Transport is scheduled for 6/28 @1600 going to [facility].    BS RN and RN CM notified of scheduled transport via Voalte @3339.

## 2021-06-28 NOTE — DISCHARGE SUMMARY
Discharge Summary    CHIEF COMPLAINT ON ADMISSION  Chief Complaint   Patient presents with   • Fall     Per EMS pt slipped and fell this evening transferring from bedside commode to bed. - LOC. - head injury. -thinners. Pt also stated that she fell out of bed on 6/18. Pt is bedbound.   • Low Back Pain     Started after falling on her bottom on 6/19.       Reason for Admission  EMS     CODE STATUS  Full Code    HPI & HOSPITAL COURSE  30-year-old female with a history of schizophrenia, borderline personality disorder, morbid obesity, GERD, h/o optic neuritis, seizures, chronic headaches, polypharmacy and multiple readmissions admitted with fall at home during transfer and unable to care for herself. CT spine negative for fracture.  Pt with rapid response 6/24 for left sided hemiplegia, CT head, CTA head and neck unremarkable. She was given 10% of tpa dose she asked if she was receiving the medication, when she was told that she was in she began moving her extremities and shortly there after stopped moving them again. Her tpa was discontinued given concern of psychogenic etiology of symptoms.  She had no recurrence of symptoms and was seen by behavioural therapy. She was treated for a UTI. She is medically cleared for discharge for long term care.       Therefore, she is discharged in good and stable condition to home with close outpatient follow-up.    The patient met 2-midnight criteria for an inpatient stay at the time of discharge.      FOLLOW UP ITEMS POST DISCHARGE  none    DISCHARGE DIAGNOSES  Active Problems:    Psychogenic disorder POA: Unknown    Urinary retention POA: Unknown      Overview: IMO load March 2020    Anxiety POA: Yes    GERD (gastroesophageal reflux disease) (Chronic) POA: Yes    Polypharmacy POA: Yes    Hypothyroidism (Chronic) POA: Yes    Muscular deconditioning POA: Unknown    Optic neuritis POA: Yes    Hypokalemia POA: Yes    Schizoaffective disorder, depressive type (HCC) POA: Yes     Seizures (HCC) POA: Unknown    UTI (urinary tract infection) POA: Unknown    Failure to thrive in adult POA: Unknown    Diplopia POA: Unknown  Resolved Problems:    Other chest pain POA: Unknown    Diabetes mellitus type 2 in obese (HCC) POA: Yes      FOLLOW UP  Future Appointments   Date Time Provider Department Center   7/7/2021 11:30 AM Kayla Wise D.O. [facility] None   8/9/2021  3:50 PM Ignacia Herrera M.D. [facility] None     No follow-up provider specified.    MEDICATIONS ON DISCHARGE     Medication List      START taking these medications      Instructions   nystatin 092164 UNIT/GM Oint  Commonly known as: MYCOSTATIN   Apply 1 g topically 2 times a day for 7 days.  Dose: 1 Application     oxybutynin 5 MG Tabs  Commonly known as: DITROPAN   Take 1 tablet by mouth 2 times a day.  Dose: 5 mg     traMADol 50 MG Tabs  Commonly known as: ULTRAM   Take 0.5 Tablets by mouth every 12 hours as needed for up to 3 days.  Dose: 25 mg        CHANGE how you take these medications      Instructions   ibuprofen 800 MG Tabs  What changed: how much to take  Commonly known as: MOTRIN   Take 0.5 Tablets by mouth every 8 hours as needed for Moderate Pain. Take with food  Dose: 400 mg     OXcarbazepine 300 MG Tabs  What changed:   · how much to take  · how to take this  · when to take this  · additional instructions  Commonly known as: TRILEPTAL   TAKE 1 TABLET BY MOUTH TWICE A DAY     oxyCODONE immediate-release 5 MG Tabs  What changed:   · when to take this  · reasons to take this  Commonly known as: ROXICODONE   Take 1 tablet by mouth 1 time a day as needed for up to 3 days.  Dose: 5 mg     Trulicity 1.5 MG/0.5ML Sopn  What changed: when to take this  Generic drug: Dulaglutide   Inject 0.5 mL under the skin every 7 days.  Dose: 0.5 mL     ziprasidone 40 MG Caps  What changed:   · how much to take  · how to take this  · when to take this  Commonly known as: GEODON   TAKE 1 CAPSULE BY MOUTH TWICE A DAY        CONTINUE taking these  medications      Instructions   albuterol 108 (90 Base) MCG/ACT Aers inhalation aerosol   Inhale 2 Puffs every 6 hours as needed for Shortness of Breath.  Dose: 2 Puff     aspirin 81 MG EC tablet   Take 81 mg by mouth every morning.  Dose: 81 mg     baclofen 10 MG Tabs  Commonly known as: LIORESAL   Take 1 tablet by mouth 3 times a day.  Dose: 10 mg     Breo Ellipta 200-25 MCG/INH Aepb  Generic drug: Fluticasone Furoate-Vilanterol   Inhale 1 Puff every morning.  Dose: 1 Puff     busPIRone 30 MG tablet  Commonly known as: BUSPAR   Take 30 mg by mouth 2 times a day.  Dose: 30 mg     Corlanor 7.5 MG Tabs tablet  Generic drug: ivabradine   Take 1 tablet by mouth 2 times a day, with meals.  Dose: 7.5 mg     ferrous sulfate 325 (65 Fe) MG tablet   Take 1 tablet by mouth every morning with breakfast.  Dose: 325 mg     fluoxetine 40 MG capsule  Commonly known as: PROZAC   TAKE 1 CAPSULE BY MOUTH EVERY DAY  Dose: 40 mg     ipratropium-albuterol 0.5-2.5 (3) MG/3ML nebulizer solution  Commonly known as: DUONEB   Take 3 mL by nebulization every four hours as needed for Shortness of Breath. Nebulizer  Dose: 3 mL     levothyroxine 100 MCG Tabs  Commonly known as: SYNTHROID   Take 100 mcg by mouth Every morning on an empty stomach.  Dose: 100 mcg     Melatonin 10 MG Tabs   Take 10 mg by mouth every bedtime.  Dose: 10 mg     mycophenolate 500 MG tablet  Commonly known as: CellCept   Doctor's comments: The patient needs to get future refills from her neuro-ophthalmologist, Dr. Yousif  Take 2 Tablets by mouth 2 times a day.  Dose: 1,000 mg     Myrbetriq 50 MG Tb24  Generic drug: Mirabegron ER   Take 50 mg by mouth every morning.  Dose: 50 mg     omeprazole 40 MG delayed-release capsule  Commonly known as: PRILOSEC   Take 1 capsule by mouth every day.  Dose: 40 mg     pregabalin 300 MG capsule  Commonly known as: LYRICA   Take 300 mg by mouth 2 times a day.  Dose: 300 mg     topiramate 50 MG tablet  Commonly known as: TOPAMAX   Take  "50 mg by mouth 2 times a day.  Dose: 50 mg        STOP taking these medications    fluticasone-salmeterol 250-50 MCG/DOSE Aepb  Commonly known as: ADVAIR     methocarbamol 750 MG Tabs  Commonly known as: ROBAXIN     methylPREDNISolone 4 MG Tbpk  Commonly known as: MEDROL DOSEPAK     metoprolol SR 25 MG Tb24  Commonly known as: TOPROL XL     traZODone 100 MG Tabs  Commonly known as: DESYREL            Allergies  Allergies   Allergen Reactions   • Depakote [Divalproex Sodium] Unspecified     Muscle spasms/muscle pain and weakness     • Montelukast [Singulair] Unspecified     Cardiac effusion   • Amitriptyline Unspecified     Headaches     • Aripiprazole [Abilify] Unspecified     Headaches/muscle twitching     • Clindamycin Nausea          • Flomax [Tamsulosin Hydrochloride] Swelling   • Metformin Unspecified     Increased lactic acid      • Tamsulosin Swelling     Swelling of legs   • Tape Rash     Tears skin off  coban with Tegaderm tape ok intermittently  RXN=ongoing   • Vancomycin Itching     Pt becomes flushed in face and chest.   RXN=7/10/16   • Wound Dressing Adhesive Hives     By pt report   • Ampicillin Rash     Pt reports that she received a rash    • Ciprofloxacin Rash         • Keflex Rash     Pt states she gets a rash with this medication  Tolerates ceftriaxone  Can take with Benadryl   • Levofloxacin Unspecified     Leg muscle cramps   • Metronidazole Rash     \"Vision problems\"   • Penicillins Hives     Can take with Benadryl   • Sulfa Drugs Itching and Myalgia     Muscle pain and weakness   • Valproic Acid Rash     .       DIET  Orders Placed This Encounter   Procedures   • Diet Order Diet: Consistent CHO (Diabetic)     Standing Status:   Standing     Number of Occurrences:   1     Order Specific Question:   Diet:     Answer:   Consistent CHO (Diabetic) [4]       ACTIVITY  As tolerated.  Weight bearing as tolerated    LINES, DRAINS, AND WOUNDS  This is an automated list. Peripheral IVs will be removed " prior to discharge.  Peripheral IV 06/25/21 20 G Left Forearm (Active)   Site Assessment Clean;Dry;Intact 06/28/21 1154   Dressing Type Transparent 06/28/21 1154   Line Status Scrubbed the hub prior to access;Flushed;Saline locked 06/28/21 1154   Dressing Status Clean;Dry;Intact 06/28/21 1154   Dressing Intervention Initial dressing 06/28/21 1154   Dressing Change Due 07/03/21 06/26/21 1844   Infiltration Grading ([facility], JOHANA) 0 06/28/21 1154   Phlebitis Scale ([facility] Only) 0 06/28/21 1154     External Urinary Catheter ([facility]) (Active)   Collection Container Suction container 06/25/21 0815      Wound 03/10/21 Incision Back (Active)       Peripheral IV 06/25/21 20 G Left Forearm (Active)   Site Assessment Clean;Dry;Intact 06/28/21 1154   Dressing Type Transparent 06/28/21 1154   Line Status Scrubbed the hub prior to access;Flushed;Saline locked 06/28/21 1154   Dressing Status Clean;Dry;Intact 06/28/21 1154   Dressing Intervention Initial dressing 06/28/21 1154   Dressing Change Due 07/03/21 06/26/21 1844   Infiltration Grading ([facility], JOHANA) 0 06/28/21 1154   Phlebitis Scale ([facility] Only) 0 06/28/21 1154               MENTAL STATUS ON TRANSFER             CONSULTATIONS  Neurology  Psychiatry   Behavioral health    PROCEDURES  none    LABORATORY  Lab Results   Component Value Date    SODIUM 138 06/23/2021    POTASSIUM 4.0 06/23/2021    CHLORIDE 109 06/23/2021    CO2 17 (L) 06/23/2021    GLUCOSE 122 (H) 06/23/2021    BUN 10 06/23/2021    CREATININE 0.70 06/23/2021    CREATININE 0.75 (L) 07/20/2010    GLOMRATE >59 07/20/2010        Lab Results   Component Value Date    WBC 4.8 06/23/2021    WBC 6.1 07/20/2010    HEMOGLOBIN 14.0 06/23/2021    HEMATOCRIT 42.9 06/23/2021    PLATELETCT 181 06/23/2021        Total time of the discharge process exceeds 38 minutes.

## 2021-06-28 NOTE — CARE PLAN
The patient is STABLE  Shift Goals  Clinical Goals: safety   Patient Goals: rest    Progress made toward(s) clinical / shift goals:  resting comfortably, reports comfort after interventions. No distress or concerns, precautions in place.      Problem: Pain - Standard  Goal: Alleviation of pain or a reduction in pain to the patient’s comfort goal  Outcome: Progressing     Problem: Knowledge Deficit - Standard  Goal: Patient and family/care givers will demonstrate understanding of plan of care, disease process/condition, diagnostic tests and medications  Outcome: Progressing     Problem: Fall Risk  Goal: Patient will remain free from falls  Outcome: Progressing

## 2021-06-28 NOTE — DISCHARGE PLANNING
Agency/Facility Name: [facility]  Outcome: Left a message for Estevan in admissions.  Awaiting a call back.    @2474  Agency/Facility Name: [facility]  Spoke To: Estevan  Outcome: Possible bed availability tomorrow or Wednesday.

## 2021-06-28 NOTE — DISCHARGE PLANNING
Agency/Facility Name: [facility]  Outcome: DPA received a voice message for Estevan requesting the pt's weight.    @1330  Agency/Facility Name: [facility]  Outcome: DPA called and left a voice message for Estevan stating the pt's weight and height.

## 2021-06-28 NOTE — DISCHARGE PLANNING
Anticipated Discharge Disposition: SNF-[facility]    Action:   Notified pt accepted at [facility] and has a bed available today; MD and bedside RN notified.     CM spoke with pt at bedside regarding discharge plan. Pt notified of [facility] acceptance; pt verbalizes agreement with transfer to [facility]. Pt declines call to family at this time, states she has called her mother already and her grandmother will be coming in shortly. Consent to transfer form signed. Transfer packet completed and given to bedside RN.     Transport request sent to Ride line coordinator for anticipated [facility]  of 1600.     Barriers to Discharge: Transport confirmation pending.     Plan: Care coordination will follow up with Ride line coordinator for confirmed transport time.

## 2021-06-28 NOTE — DISCHARGE PLANNING
Agency/Facility Name: [facility]  Outcome: DAVID received a voice message from Estevan at 1101 stating they can accept pt today.  Estevan does not have any transport times.    @1221  Agency/Facility Name: [facility]  Outcome: DAVID left a voice message for Estevan asking what time would he like transport to be set up.  Awaiting a call back.

## 2021-06-28 NOTE — DISCHARGE PLANNING
Agency/Facility Name: [facility]  Spoke To: Estevan  Outcome: Estevan requested a 1600 transport time.    SONYA [place] notified via Teams.

## 2021-06-28 NOTE — PROGRESS NOTES
4 Eyes Skin Assessment Completed by SONYA Zepeda and Amna HASSAN.     Head WDL  Ears red but blanching   Nose WDL  Mouth Redness  Neck WDL  Breast/Chest redness under breasts-nystatin  Shoulder Blades WDL  Spine WDL  (R) Arm/Elbow/Hand WDL  (L) Arm/Elbow/Hand WDL  Abdomen Bruising  Groin Incision  Scrotum/Coccyx/Buttocks WDL  (R) Leg WDL  (L) Leg Incision  (R) Heel/Foot/Toe WDL with heel callus  (L) Heel/Foot/Toe WDL with heel callus     Devices In Places Pulse Ox,        Interventions In Place Heel Mepilex, Sacral Mepilex, Waffle Overlay, Pillows, Q2 Turns and Heels Loaded W/Pillows     Possible Skin Injury No     Pictures Uploaded Into Epic N/A  Wound Consult Placed N/A  RN Wound Prevention Protocol Ordered No

## 2021-06-28 NOTE — PROGRESS NOTES
Pt being dc'd to [facility] via [facility] The following prescriptions given oxycdone and tramadol. IV dc'd. Dc instructions discussed. All questions answered.  Patient agreeable to dc plan. Patient has all belongings at time of dc. Report called to liam at [facility]. Pt sent with packet with scripts, cobra, belongings. Vivienne (GPA) was at bedside at dc.

## 2021-06-28 NOTE — PROGRESS NOTES
Hospital Medicine Daily Progress Note    Date of Service  6/28/2021    Chief Complaint  30 y.o. female admitted 6/20/2021 with fall    Hospital Course  30-year-old female with a history of schizophrenia, borderline personality disorder, morbid obesity, GERD, h/o optic neuritis, seizures, chronic headaches, polypharmacy and multiple readmissions admitted with fall at home during transfer and unable to care for herself. CT spine negative for fracture.  Pt with rapid response 6/24 for left sided hemiplegia, CT head, CTA head and neck unremarkable. She was given 10% of tpa dose she asked if she was receiving the medication, when she was told that she was in she began moving her extremities and shortly there after stopped moving them again. Her tpa was discontinued given concern of psychogenic etiology of symptoms.        Interval Problem Update  SHARA overnight  She is urinating on her own now, no further straight cath needed  No new complaints    Consultants/Specialty  Neurology  Psychiatry   Behavioral health    Code Status  Full Code    Disposition  accepted to [facility] pending bed availability  Will need long term SNF, [facility], vs group home, would NOT send back home as patient has proven she cannot take care of herself and her 85 year old grandmother also cannot take care of patient.    Review of Systems  Review of Systems   Constitutional: Positive for malaise/fatigue. Negative for chills and fever.   HENT: Negative for hearing loss.    Eyes: Negative for blurred vision, double vision and pain.   Respiratory: Negative for cough and shortness of breath.    Cardiovascular: Negative for chest pain, palpitations and orthopnea.   Gastrointestinal: Negative for nausea and vomiting.   Genitourinary: Negative for dysuria and hematuria.   Musculoskeletal: Positive for back pain. Negative for falls.   Skin: Negative for itching and rash.   Neurological: Positive for weakness.        Physical Exam  Temp:  [36.6 °C (97.8  °F)-37.6 °C (99.6 °F)] 36.7 °C (98.1 °F)  Pulse:  [] 79  Resp:  [16-20] 20  BP: (100-131)/(61-88) 100/61  SpO2:  [93 %-96 %] 96 %    Physical Exam  Vitals and nursing note reviewed.   Constitutional:       General: She is not in acute distress.     Appearance: She is obese. She is not toxic-appearing or diaphoretic.   HENT:      Head: Normocephalic and atraumatic.   Pulmonary:      Effort: Pulmonary effort is normal.      Breath sounds: Normal breath sounds.   Abdominal:      General: Bowel sounds are normal. There is no distension.      Palpations: Abdomen is soft.   Neurological:      Mental Status: She is alert and oriented to person, place, and time. Mental status is at baseline.   Psychiatric:         Mood and Affect: Mood normal.         Behavior: Behavior normal.         Fluids    Intake/Output Summary (Last 24 hours) at 6/28/2021 0822  Last data filed at 6/27/2021 1300  Gross per 24 hour   Intake 480 ml   Output 600 ml   Net -120 ml       Laboratory                        Imaging  IR-US GUIDED PIV   Final Result    Ultrasound-guided PERIPHERAL IV INSERTION performed by    qualified nursing staff as above.      CT-CTA NECK WITH & W/O-POST PROCESSING   Final Result      1. No evidence of flow-limiting stenosis in the cervical carotid or cervical vertebral arteries.   2. Enlargement of main pulmonary artery, similar to prior could be seen with pulmonary hypertension.      CT-CEREBRAL PERFUSION ANALYSIS   Final Result      1.  Cerebral blood flow less than 30% likely representing completed infarct = 0 mL.      2.  T Max more than 6 seconds likely representing combination of completed infarct and ischemia = 0 mL.      3.  Mismatched volume likely representing ischemic brain/penumbra = None      4.  Please note that the cerebral perfusion was performed on the limited brain tissue around the basal ganglia region. Infarct/ischemia outside the CT perfusion sections can be missed in this study.      CT-CTA HEAD  WITH & W/O-POST PROCESS   Final Result         1. No hemodynamically significant narrowing of the major intracranial vessels.      CT-HEAD W/O   Final Result         1. No acute intracranial abnormality. No evidence of acute intracranial hemorrhage or mass lesion.               DX-CHEST-PORTABLE (1 VIEW)   Final Result      No acute cardiac or pulmonary abnormality is noted.      [id number] VIEW   Final Result      Above average stool volume      Hepatomegaly      CT-LSPINE W/O PLUS RECONS   Final Result      1.  No acute fracture is identified      2.  Status post anterior fusion L4-5.      3.  Right nephrolithiasis           Assessment/Plan  Psychogenic disorder  Assessment & Plan  Complained of left-sided hemiplegia on 6/20 4 AM.  Negative CT head and CTA of head and neck  Neurology had been involved in TPA has started to be administered however after a few minutes patient started moving her arms without any issues so TPA was discontinued after 10% was administered  Patient seems to be complaining of a variety of symptoms including diplopia, chest pain, and other neurological symptoms.  I think there may be a very heavy psychogenic component to this  Patient has been seen by psychiatry and behavioral therapy    Diplopia  Assessment & Plan  Unclear etiology  Began shortly after chest pain  Resolved on its own    Failure to thrive in adult  Assessment & Plan  Multiple re-admissions, falls at home, limited support (85 year old grandmother who can't take care of patient)  Needs long term care    UTI (urinary tract infection)  Assessment & Plan  Patient with dysuria however urine culture negative  Continue Macrobid x3 days  Phenazopyridine ordered    Seizures (HCC)  Assessment & Plan  Continue home AEDs: topamax, trileptal    Schizoaffective disorder, depressive type (HCC)- (present on admission)  Assessment & Plan  Follow-up behavioral health consult,   Recurrent ER presentations concerning for failure to thrive in  current residential setting.    Hypokalemia- (present on admission)  Assessment & Plan  S/p supplementation    Optic neuritis- (present on admission)  Assessment & Plan  Continue home cellcept    Muscular deconditioning  Assessment & Plan  Recently seen by PT/OT who recommended SNF, below baseline    Hypothyroidism- (present on admission)  Assessment & Plan  Continue home synthroid, recent TSH 2 weeks ago normal    Polypharmacy- (present on admission)  Assessment & Plan  Holding lyrica, BB, ivabradine, trazodone, muscle relaxers   oxycodone reduced to daily PRN    GERD (gastroesophageal reflux disease)- (present on admission)  Assessment & Plan  Continue home PPI    Anxiety- (present on admission)  Assessment & Plan  Continue home psych meds: prozac, buspar    Urinary retention  Assessment & Plan  Required straight cath on 6/20 7 AM.  If recurs will place Andrade catheter       VTE prophylaxis: Lovenox

## 2021-06-28 NOTE — DISCHARGE INSTRUCTIONS
Discharge Instructions    Discharged to home by car with relative. Discharged via wheelchair, hospital escort: Yes.  Special equipment needed: Not Applicable    Be sure to schedule a follow-up appointment with your primary care doctor or any specialists as instructed.     Discharge Plan:   Diet Plan: Discussed  Activity Level: Discussed  Confirmed Follow up Appointment: Patient to Call and Schedule Appointment  Confirmed Symptoms Management: Discussed  Medication Reconciliation Updated: Yes    I understand that a diet low in cholesterol, fat, and sodium is recommended for good health. Unless I have been given specific instructions below for another diet, I accept this instruction as my diet prescription.   Other diet: diabetic    Special Instructions: None    · Is patient discharged on Warfarin / Coumadin?   No   Understanding Your Risk for Falls  Each year, millions of people suffer serious injuries from falls. It is important to understand your risk for falling. Talk with your health care provider about your risk and what you can do to lower it. There are actions you can take at home to lower your risk.  If you do have a serious fall, it is important to tell your health care provider. Falling once raises your risk for falling again.  How can falls affect me?  Serious injuries from falls are common. These include:  · Broken bones. Most hip fractures are caused by falls.  · Traumatic brain injury (TBI). Falls are the most common cause of TBI.  Fear of falling can also cause you to avoid activities and stay at home. This can make your muscles weaker and actually raise your risk for a fall.  What can increase my risk?  Serious injuries from a fall most often happen to people older than age 65. Children and young adults ages 15-29 are also at higher risk. The more risk factors you have for falling, the higher your risk. Risk factors include:  · Weakness in the lower body.  · Lack (deficiency) of vitamin D.  · Weak  bones (osteoporosis).  · Being generally weak or confused due to long-term (chronic) illness.  · Dizziness or balance problems.  · Poor vision.  · Having depression.  · Medicine that causes dizziness or drowsiness. These can include medicines for your blood pressure, heart, anxiety, insomnia, or edema, as well as pain medicines and muscle relaxants.  · Drinking alcohol.  · Foot pain or improper footwear.  · Working at a dangerous job.  · Having had a fall in the past.  · Tripping hazards at home, such as floor clutter or loose rugs, or poor lighting.  · Having pets or clutter in your home.  What actions can I take to lower my risk of falling?         · Maintain physical fitness:  ? Do strength and balance exercises. Consider taking a regular class to build strength and balance. Yoga and sanna chi are good options.  ? Have your eyes checked every year and your vision prescription updated as needed.  · Remove all clutter from walkways and stairways, including extension cords.  · Use a cordless phone.  · Do not use throw rugs. Make sure all carpeting is taped or tacked down securely.  · Use good lighting in all rooms. Keep a flashlight near your bed.  · Make sure there is a clear path from your bed to the bathroom. Use night-lights.  · Install grab bars for your tub, shower, and toilet. Use a bath mat in your tub or shower.  · Attach secure railings on both sides of your stairs.  · Repair uneven or broken steps.  · Use a cane or walker as directed by your health care provider.  · Wear nonskid shoes. Do not wear high heels. Do not walk around the house in socks or slippers.  · Avoid walking on icy or slippery surfaces. Walk on the grass instead of on icy or slick sidewalks. Where you can, use ice melt to get rid of ice on walkways.  Questions to ask your health care provider  · Can you help me evaluate my risk for a fall?  · Do any of my medicines make me more likely to fall?  · Should I take a vitamin D  supplement?  · What exercises can I do to improve my strength and balance?  · Should I make an appointment to have my vision checked?  · Do I need a bone density test to check for osteoporosis?  · Would it help to use a cane or a walker?  Where to find more information  · Centers for Disease Control and Prevention, [place]EL: cdc.gov  · Community-Based Fall Prevention Programs: cdc.gov  · National [place] on Aging: mo3nxds.mike.nih.gov  Contact a health care provider if:  · You fall at home.  · You are afraid of falling at home.  · You feel weak, drowsy, or dizzy at home.  Summary  · People 65 and older are at high risk for falling. However, older people are not the only ones injured in falls. Children and young adults have a higher-than-normal risk, too.  · Talk with your health care provider about your risks for falling and how to lower those risks.  · Taking certain precautions at home can lower your risk for falling.  · If you fall, always tell your health care provider.  This information is not intended to replace advice given to you by your health care provider. Make sure you discuss any questions you have with your health care provider.  Document Released: 08/01/2018 Document Revised: 03/19/2019 Document Reviewed: 08/01/2018  [employer] Patient Education © 2020 [employer]        Failure to Thrive, Adult  Failure to thrive is a group of symptoms that affect adults, including the elderly. These symptoms include eating too little and losing weight. People who have this may not want to be with friends, or they may not want to eat or drink. This condition is not a normal part of getting older.  What are the causes?  · A disease, such as dementia, diabetes, cancer, or lung disease.  · A health problem, such as a vitamin deficiency or a heart problem.  · A disorder, such as sadness (depression).  · A disability.  · Medicines.  · Having tooth or mouth problems.  · Neglect or being treated badly.  · In some cases, the  cause may not be known.  What are the signs or symptoms?  · Loss of more than 5% of your body weight.  · Being more tired than normal after an activity.  · Having trouble getting up after sitting.  · Not feeling hungry.  · Not getting out of bed.  · Not wanting to do your normal activities.  · Sadness.  · Getting infections often.  · Bedsores.  · Taking a long time to get better after an infection, injury, or a surgery.  · Weakness.  How is this treated?  Treatment for this condition depends on the cause. It may be treated by:  · Treating a disease or disorder that is causing symptoms.  · Having talk therapy or taking medicine to treat sadness.  · Eating better, such as eating more often or taking nutritional supplements.  · Changing or stopping a medicine.  · Having physical or occupational therapy.  It often takes a team of doctors to find the right treatment.  Follow these instructions at home:    · Take over-the-counter and prescription medicines only as told by your doctor.  · Eat a healthy diet. Make sure that you eat enough. Ask your doctor how much you should eat.  · Be active. Do exercises that make you stronger (strength training). A physical therapist can help to set up a program that fits you.  · Make sure that you are safe at home.  · Have a plan for what to do if you cannot make decisions for yourself.  Contact a doctor if you:  · Are not able to eat well.  · Are not able to move around.  · Feel very sad.  · Feel very hopeless.  Get help right away if:  · You think about ending your life.  · You cannot eat or drink.  · You do not get out of bed.  · Staying at home is not safe.  · You have a fever.  Summary  · Failure to thrive is a group of symptoms that affect adults, including the elderly.  · Symptoms include eating too little and losing weight.  · Take all medicines only as told by your doctor.  · Eat a healthy diet. Make sure that you eat enough. Ask your doctor how much you should eat.  · Be  active. Do exercises that make you stronger. A physical therapist can help to set up a program that is right for you.  This information is not intended to replace advice given to you by your health care provider. Make sure you discuss any questions you have with your health care provider.  Document Released: 12/06/2012 Document Revised: 08/20/2019 Document Reviewed: 08/20/2019  [employer] Patient Education © 2020 [employer]      Acute Urinary Retention, Female    Acute urinary retention means that you cannot pee (urinate) at all, or that you pee too little and your bladder is not emptied completely. If it is not treated, it can lead to kidney damage or other serious problems.  Follow these instructions at home:  · Take over-the-counter and prescription medicines only as told by your doctor. Ask your doctor what medicines you should stay away from. Do not take any medicine unless your doctor says it is okay to do so.  · If you were sent home with a tube that drains pee from the bladder (catheter), take care of it as told by your doctor.  · Drink enough fluid to keep your pee clear or pale yellow.  · If you were given an antibiotic, take it as told by your doctor. Do not stop taking the antibiotic even if you start to feel better.  · Do not use any products that contain nicotine or tobacco, such as cigarettes and e-cigarettes. If you need help quitting, ask your doctor.  · Watch for changes in your symptoms. Tell your doctor about them.  · If told, keep track of any changes in your blood pressure at home. Tell your doctor about them.  · Keep all follow-up visits as told by your doctor. This is important.  Contact a doctor if:  · You have spasms or you leak pee when you have spasms.  Get help right away if:  · You have chills or a fever.  · You have blood in your pee.  · You have a tube that drains the bladder and:  ? The tube stops draining pee.  ? The tube falls out.  Summary  · Acute urinary retention means that  you cannot pee at all, or that you pee too little and your bladder is not emptied completely. If it is not treated, it can result in kidney damage or other serious problems.  · If you were sent home with a tube that drains pee from the bladder, take care of it as told by your doctor.  · Pay attention to any changes in your symptoms. Tell your doctor about them.  This information is not intended to replace advice given to you by your health care provider. Make sure you discuss any questions you have with your health care provider.  Document Released: 06/05/2009 Document Revised: 11/30/2018 Document Reviewed: 01/19/2018  [employer] Patient Education © 2020 [employer]      Urinary Tract Infection, Adult  A urinary tract infection (UTI) is an infection of any part of the urinary tract. The urinary tract includes:  · The kidneys.  · The ureters.  · The bladder.  · The urethra.  These organs make, store, and get rid of pee (urine) in the body.  What are the causes?  This is caused by germs (bacteria) in your genital area. These germs grow and cause swelling (inflammation) of your urinary tract.  What increases the risk?  You are more likely to develop this condition if:  · You have a small, thin tube (catheter) to drain pee.  · You cannot control when you pee or poop (incontinence).  · You are female, and:  ? You use these methods to prevent pregnancy:  ? A medicine that kills sperm (spermicide).  ? A device that blocks sperm (diaphragm).  ? You have low levels of a female hormone (estrogen).  ? You are pregnant.  · You have genes that add to your risk.  · You are sexually active.  · You take antibiotic medicines.  · You have trouble peeing because of:  ? A prostate that is bigger than normal, if you are male.  ? A blockage in the part of your body that drains pee from the bladder (urethra).  ? A kidney stone.  ? A nerve condition that affects your bladder (neurogenic bladder).  ? Not getting enough to drink.  ? Not  peeing often enough.  · You have other conditions, such as:  ? Diabetes.  ? A weak disease-fighting system (immune system).  ? Sickle cell disease.  ? Gout.  ? Injury of the spine.  What are the signs or symptoms?  Symptoms of this condition include:  · Needing to pee right away (urgently).  · Peeing often.  · Peeing small amounts often.  · Pain or burning when peeing.  · Blood in the pee.  · Pee that smells bad or not like normal.  · Trouble peeing.  · Pee that is cloudy.  · Fluid coming from the vagina, if you are female.  · Pain in the belly or lower back.  Other symptoms include:  · Throwing up (vomiting).  · No urge to eat.  · Feeling mixed up (confused).  · Being tired and grouchy (irritable).  · A fever.  · Watery poop (diarrhea).  How is this treated?  This condition may be treated with:  · Antibiotic medicine.  · Other medicines.  · Drinking enough water.  Follow these instructions at home:    Medicines  · Take over-the-counter and prescription medicines only as told by your doctor.  · If you were prescribed an antibiotic medicine, take it as told by your doctor. Do not stop taking it even if you start to feel better.  General instructions  · Make sure you:  ? Pee until your bladder is empty.  ? Do not hold pee for a long time.  ? Empty your bladder after sex.  ? Wipe from front to back after pooping if you are a female. Use each tissue one time when you wipe.  · Drink enough fluid to keep your pee pale yellow.  · Keep all follow-up visits as told by your doctor. This is important.  Contact a doctor if:  · You do not get better after 1-2 days.  · Your symptoms go away and then come back.  Get help right away if:  · You have very bad back pain.  · You have very bad pain in your lower belly.  · You have a fever.  · You are sick to your stomach (nauseous).  · You are throwing up.  Summary  · A urinary tract infection (UTI) is an infection of any part of the urinary tract.  · This condition is caused by germs  in your genital area.  · There are many risk factors for a UTI. These include having a small, thin tube to drain pee and not being able to control when you pee or poop.  · Treatment includes antibiotic medicines for germs.  · Drink enough fluid to keep your pee pale yellow.  This information is not intended to replace advice given to you by your health care provider. Make sure you discuss any questions you have with your health care provider.  Document Released: 06/05/2009 Document Revised: 12/05/2019 Document Reviewed: 06/27/2019  [employer] Patient Education © 2020 [employer]      Depression / Suicide Risk    As you are discharged from this [facility] facility, it is important to learn how to keep safe from harming yourself.    Recognize the warning signs:  · Abrupt changes in personality, positive or negative- including increase in energy   · Giving away possessions  · Change in eating patterns- significant weight changes-  positive or negative  · Change in sleeping patterns- unable to sleep or sleeping all the time   · Unwillingness or inability to communicate  · Depression  · Unusual sadness, discouragement and loneliness  · Talk of wanting to die  · Neglect of personal appearance   · Rebelliousness- reckless behavior  · Withdrawal from people/activities they love  · Confusion- inability to concentrate     If you or a loved one observes any of these behaviors or has concerns about self-harm, here's what you can do:  · Talk about it- your feelings and reasons for harming yourself  · Remove any means that you might use to hurt yourself (examples: pills, rope, extension cords, firearm)  · Get professional help from the community (Mental Health, Substance Abuse, psychological counseling)  · Do not be alone:Call your Safe Contact- someone whom you trust who will be there for you.  · Call your local CRISIS HOTLINE 155-8742 or 107-506-4053  · Call your local Children's Mobile Crisis Response Team [facility]  (613) 248-2419 or www.[employer]ista  · Call the toll free National Suicide Prevention Hotlines   · National Suicide Prevention Lifeline 809-336-BWOO (2069)  · National [employer] Line Network 800-SUICIDE (042-4733)

## 2021-07-01 NOTE — ED NOTES
Physical Therapy  Visit Type: re-evaluation and treatment  Precautions:  Medical precautions:  fall risk; standard precautions.  6/16/21: Pt was transferred to CCU due to increased need for O2 and was intubated  6/15/21: s/p TAVR  /6/22/21: received new order for PT  6/29 - transferred to CCU due to respiratory distress; new orders received to continue PT  Lines:     Basic: telemetry, continuous pulse oximetry, IV, urinary catheter and O2 (6L oxygen; B heel suspension boots)      Lines in chart and on patient reviewed, cautions maintained throughout session.  Hearing: no hearing deficits  Vision:     Current vision: wears glasses all the time  Safety Measures: bed alarm and bed rails    SUBJECTIVE  Patient agreed to participate in therapy this date.  No verbalizations other than intermittent moaning; shakes head yes/no; able to follow commands  Patient / Family Goal: unable to state    Pain     Location: unable to state; moans with movement of RLE  RN informed on pain level     OBJECTIVE   Pulse Ox: 97Heart Rate: 87    Respiration Rate: 30  Level of consciousness: lethargic and drowsy    Unable to assess    Affect/Behavior: sleepy and cooperative  Range of Motion (measured in degrees unless otherwise noted, active unless indicated)  Comments / Details: Patient has limited AROM throughout BLE's  Strength (out of 5 unless otherwise indicated)   Comments / Details:  Grossly 2+/5 B quads and R DF; 2-/5 L DF  Balance    Sitting: , Dynamic: minimal assist double upper extrmity support  Balance Details: Patient required max A initially then progressed to SBA for brief periods of time. Patient sat at the EOB for approx. 5 minutes total    Bed Mobility:      Rolling right: total assist - non-dependent (x 1)    Repositioning in bed: total assist - dependent (max A x 2)      Side-lying to sit: total assist - non-dependent (x 1)    Sit to side-lying: total assist - non-dependent (x 1)  Training completed:    Tasks: supine to  Walker to room 12.   sit, sit to supine, boosting and rolling right    Education details: patient safety and patient requires additional training    Patient initially required max A to maintain her sitting balance but then progressed to SBA for brief periods of time      Interventions     Supine    Lower Extremity: Bilateral: ankle pumps, heel slides and hip abduction/adduction, PROM, AAROM and AROM, 10 reps, 1 sets  Seated    Lower Extremity: Bilateral: knee extensions, knee flexion, toe raises and heel raises, AAROM and AROM, 10 reps, 1 sets  Training provided: activity tolerance, balance retraining, bed mobility training, positioning, transfer training, use of adaptive equipment, safety training and HEP training    Skilled input: Verbal instruction/cues and tactile instruction/cues       ASSESSMENT    Impairments: balance deficits, strength, range of motion, pain and activity tolerance  Functional Limitations: all functional mobility  89 y/o female s/p TAVR. Pt currently presents with  overall weakness.  Pt requiring total assist  for bed mobility.  She initially required max A to maintain her sitting balance at the EOB however she progressed to SBA over time. Patient was not verbal except for intermittent moaning with movement of RLE - RN aware.  Pt with decline in prior level of function.     Discharge Recommendations   Recommendation for Discharge: PT IL: Patient needs daily, skilled therapy for 1-3 hours a day by at least two disciplines        PT/OT Mobility Equipment for Discharge: Has RW   PT/OT ADL Equipment for Discharge: will monitor, denies needs      Therapy Diagnosis:  Other Abnormalities of Gait and Mobility  Skilled therapy is required to address these limitations in attempt to maximize the patient's independence.  Progress: slow progress, medical status limitations    End of Session:   Location: in bed  Safety measures: alarm system in place/re-engaged, call light within reach, equipment intact, lines intact and bed  rails x4  Handoff to: nurse    PLAN    Suggestions for next session as indicated: PT Frequency: 3 days/week  Frequency Comments: 7/1;  GILDARDO; P2-1; TAVR    A minimum of 8 minutes per session x 1 week.  Interventions: balance, bed mobility, body mechanics, gait training, strengthening, ROM, safety education, functional transfer training, stairs retraining and HEP train/position  Agreement to plan and goals: patient agrees with goals and treatment plan        GOALS  Review Date: 7/7/2021  Long Term Goals: (to be met by time of discharge from hospital)  Sit to supine: Patient will complete sit to supine moderate assist.  Status: revised, this goal modified  Supine to sit: Patient will complete supine to sit moderate assist.  Status: revised, this goal modified  Sit (edge of bed): Patient will sit at edge of bed for 15 mins sitting at eob, supervision.   Status: progressing/ongoing  Sit to stand: Patient will complete sit to stand transfer with gait belt and 2-wheeled walker, moderate assist.   Status: revised, this goal modified  Stand to sit: Patient will complete stand to sit transfer with gait belt and 2-wheeled walker, moderate assist.   Status: revised, this goal modified  Ambulation (even): Patient will ambulate on even surface for 15 feet with gait belt and 2-wheeled walker, moderate assist.   Status: revised, this goal modified    Documented in the chart in the following areas: Assessment.      Therapy procedure time and total treatment time can be found documented on the Time Entry flowsheet

## 2021-07-02 NOTE — PROGRESS NOTES
Inbound call from patient returning . Med review completed. No clinically significant medication issues noted. Patient denies any trouble with adherence.     Kristin reports difficulty with affording lidocaine patches and phenazopyridine. Advised her that both medications are available OTC and may be more affordable than prescription cost without insurance coverage. Patient agrees.    She reports side effects from Lyrica causing fogginess. She states she is taking this for fibromyalgia and shingles and may be able to reduce the dose after shingles outbreak clears. No further medication related issues noted at this time.     No Yes

## 2021-08-09 ENCOUNTER — APPOINTMENT (OUTPATIENT)
Dept: SLEEP MEDICINE | Facility: MEDICAL CENTER | Age: 31
End: 2021-08-09
Payer: MEDICARE

## 2021-09-14 ENCOUNTER — TELEPHONE (OUTPATIENT)
Dept: PHYSICAL THERAPY | Facility: MEDICAL CENTER | Age: 31
End: 2021-09-14

## 2021-09-14 NOTE — THERAPY
Called patient between 75 and 120 days after discharge. Assessed Modified [place] Scale to be a 0

## 2021-09-24 ENCOUNTER — TELEMEDICINE (OUTPATIENT)
Dept: CARDIOLOGY | Facility: MEDICAL CENTER | Age: 31
End: 2021-09-24
Payer: MEDICARE

## 2021-09-24 VITALS
HEIGHT: 65 IN | OXYGEN SATURATION: 96 % | HEART RATE: 90 BPM | BODY MASS INDEX: 40.82 KG/M2 | WEIGHT: 245 LBS | SYSTOLIC BLOOD PRESSURE: 149 MMHG | DIASTOLIC BLOOD PRESSURE: 86 MMHG

## 2021-09-24 DIAGNOSIS — I47.11 INAPPROPRIATE SINUS TACHYCARDIA (HCC): ICD-10-CM

## 2021-09-24 PROCEDURE — 99213 OFFICE O/P EST LOW 20 MIN: CPT | Mod: 95 | Performed by: INTERNAL MEDICINE

## 2021-09-24 ASSESSMENT — ENCOUNTER SYMPTOMS
DIZZINESS: 0
PALPITATIONS: 0
SHORTNESS OF BREATH: 0
COUGH: 0

## 2021-09-24 ASSESSMENT — FIBROSIS 4 INDEX: FIB4 SCORE: 0.5

## 2021-09-24 NOTE — PROGRESS NOTES
"Chief Complaint   Patient presents with   • Supraventricular Tachycardia (SVT)       Subjective:   Kristin Balderrama is a 30 y.o. female who presents today for sinus tachycardia, ablation 2016 with post ablation PAF on chronic Corlonar therapy.    This evaluation was conducted via Zoom using secure and encrypted videoconferencing technology. The patient was in a private location in the state of [place].    The patient's identity was confirmed and verbal consent was obtained for this virtual visit.  Session began 1:01 PM, ended 1:12 PM, total time 11 minutes.    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  Was seen at [facility] ER for some chest discomfort.  Evaluated and sent home.  No lightheadedness or dizziness.    Past Medical History:   Diagnosis Date   • Abdominal pain    • Anginal syndrome     random chest pain especially with tachycardia   • Apnea, sleep    • Arrhythmia     \"sinus tachycardia\", cariologist, Dr. Kumar; ablation 2/2016   • Arthritis     osteo   • ASTHMA     when around smoke   • Atrial fibrillation (HCC)    • Back pain    • Borderline personality disorder (HCC)    • Breath shortness     with tachycardia   • Bronchitis    • Cardiac arrhythmia    • Chickenpox    • Chronic UTI 9/18/2010   • Cough    • Daytime sleepiness    • Dental disorder 03/08/2021    infected tooth   • Depression    • Diabetes (HCC)    • Diarrhea     incontinece   • Disorder of thyroid    • Fall    • Fatigue    • Frequent headaches    • Gasping for breath    • Gynecological disorder     PCOS   • Headache(784.0)    • Heart burn    • Heart murmur    • History of falling    • Indigestion    • Migraine    • Mitochondrial disease (HCC)    • 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HCC)    • Nausea    • Obesity    • Other fatigue 6/29/2020   • Pain 08-15-12    back, 7/10   • Painful joint    • PCOS (polycystic ovarian syndrome)    • Pneumonia 2012 " "12/2020   • Psychosis (HCC)    • Ringing in ears    • Scoliosis    • Shortness of breath     O2 as needed   • Sinus tachycardia 10/31/2013   • Sleep apnea     CPAP \"pulmonary doctor took me off mid year 2016\"   • Snoring    • Tonsillitis    • 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HCC)    • Tuberculosis     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   • Urinary bladder disorder     Suprapubic cath   • Urinary incontinence    • Weakness    • Wears glasses      Past Surgical History:   Procedure Laterality Date   • BOWEL STIMULATOR INSERTION  3/10/2021    Procedure: INSERTION, ELECTRODE LEADS AND PULSE GENERATOR, NEUROSTIMULATOR, SACRAL - REMOVAL OF INTERSTIM WITH REPLACEMENT OF SACRAL NEUROMODULATION DEVICE;  Surgeon: Joe Noyola M.D.;  Location: SURGERY [facility];  Service: General   • MUSCLE BIOPSY Right 1/26/2017    Procedure: MUSCLE BIOPSY - THIGH;  Surgeon: Isidro Vigil M.D.;  Location: [facility];  Service:    • GASTROSCOPY WITH BALLOON DILATATION N/A 1/4/2017    Procedure: GASTROSCOPY WITH DILATATION;  Surgeon: Torres Vargas M.D.;  Location: [facility];  Service:    • BOWEL STIMULATOR INSERTION  7/13/2016    Procedure: BOWEL STIMULATOR INSERTION FOR PERMANENT INTERSTIM SACRAL IMPLANT;  Surgeon: Joe Noyola M.D.;  Location: [facility];  Service:    • RECOVERY  1/27/2016    Procedure: CATH LAB EP STUDY WITH SINUS NODE MODIFICATION ABHINAV;  Surgeon: Recoveryonly Surgery;  Location: [place] UN[place] [facility];  Service:    • OTHER CARDIAC SURGERY  1/2016    cardiac ablation   • NEURO DEST FACET L/S W/IG SNGL  4/21/2015    Performed by Reza Tabor at [facility]   • LUMBAR FUSION ANTERIOR  8/21/2012    Performed by SUSIE SAWANT at [facility]S   • ALYSSA BY LAPAROSCOPY  8/29/2010    Performed by SHAYY JOHNS at [facility] ORS   • LAMINOTOMY     • OTHER ABDOMINAL SURGERY     • TONSILLECTOMY      tonsillectomy     Family History   Problem " Relation Age of Onset   • Hypertension Mother    • Sleep Apnea Mother    • Heart Disease Mother    • Other Mother         hypothryod   • Hypertension Maternal Uncle    • Heart Disease Maternal Grandmother    • Hypertension Maternal Grandmother    • No Known Problems Sister    • Other Sister         Narcolepsy;fibromyalsia;bone;nerve   • Genitourinary () Problems Sister         endometriosis     Social History     Socioeconomic History   • Marital status: Single     Spouse name: Not on file   • Number of children: Not on file   • Years of education: Not on file   • Highest education level: Not on file   Occupational History   • Not on file   Tobacco Use   • Smoking status: Never Smoker   • Smokeless tobacco: Never Used   Vaping Use   • Vaping Use: Never used   Substance and Sexual Activity   • Alcohol use: No     Alcohol/week: 0.0 oz   • Drug use: Not Currently     Frequency: 7.0 times per week     Types: Marijuana   • Sexual activity: Not Currently     Birth control/protection: Implant   Other Topics Concern   • Not on file   Social History Narrative    ** Merged History Encounter **          Social Determinants of Health     Financial Resource Strain: Medium Risk   • Difficulty of Paying Living Expenses: Somewhat hard   Food Insecurity: Food Insecurity Present   • Worried About Running Out of Food in the Last Year: Sometimes true   • Ran Out of Food in the Last Year: Sometimes true   Transportation Needs: Unmet Transportation Needs   • Lack of Transportation (Medical): No   • Lack of Transportation (Non-Medical): Yes   Physical Activity:    • Days of Exercise per Week:    • Minutes of Exercise per Session:    Stress:    • Feeling of Stress :    Social Connections:    • Frequency of Communication with Friends and Family:    • Frequency of Social Gatherings with Friends and Family:    • Attends [employer] Services:    • Active Member of Clubs or Organizations:    • Attends Club or Organization Meetings:    • Marital  "Status:    Intimate Partner Violence:    • Fear of Current or Ex-Partner:    • Emotionally Abused:    • Physically Abused:    • Sexually Abused:      Allergies   Allergen Reactions   • Depakote [Divalproex Sodium] Unspecified     Muscle spasms/muscle pain and weakness     • Montelukast [Singulair] Unspecified     Cardiac effusion   • Amitriptyline Unspecified     Headaches     • Aripiprazole [Abilify] Unspecified     Headaches/muscle twitching     • Clindamycin Nausea          • Flomax [Tamsulosin Hydrochloride] Swelling   • Metformin Unspecified     Increased lactic acid      • Tamsulosin Swelling     Swelling of legs   • Tape Rash     Tears skin off  coban with Tegaderm tape ok intermittently  RXN=ongoing   • Vancomycin Itching     Pt becomes flushed in face and chest.   RXN=7/10/16   • Wound Dressing Adhesive Hives     By pt report   • Ampicillin Rash     Pt reports that she received a rash    • Ciprofloxacin Rash         • Keflex Rash     Pt states she gets a rash with this medication  Tolerates ceftriaxone  Can take with Benadryl   • Levofloxacin Unspecified     Leg muscle cramps   • Metronidazole Rash     \"Vision problems\"   • Penicillins Hives     Can take with Benadryl   • Sulfa Drugs Itching and Myalgia     Muscle pain and weakness   • Valproic Acid Rash     .     Outpatient Encounter Medications as of 9/24/2021   Medication Sig Dispense Refill   • OXcarbazepine (TRILEPTAL) 300 MG Tab Take 1 Tablet by mouth 2 times a day. APPOINTMENT REQUIRED FOR ADDITIONAL REFILLS. 180 Tablet 0   • ziprasidone (GEODON) 40 MG Cap TAKE 1 CAPSULE BY MOUTH TWICE A DAY.  APPOINTMENT REQUIRED FOR ADDITIONAL REFILLS. 180 Capsule 0   • ibuprofen (MOTRIN) 800 MG Tab Take 0.5 Tablets by mouth every 8 hours as needed for Moderate Pain. Take with food 30 tablet 0   • pregabalin (LYRICA) 300 MG capsule Take 300 mg by mouth 2 times a day.     • busPIRone (BUSPAR) 30 MG tablet Take 30 mg by mouth 2 times a day.     • " ipratropium-albuterol (DUONEB) 0.5-2.5 (3) MG/3ML nebulizer solution Take 3 mL by nebulization every four hours as needed for Shortness of Breath. Nebulizer 360 mL 2   • ivabradine (CORLANOR) 7.5 MG Tab tablet Take 1 tablet by mouth 2 times a day, with meals. 180 tablet 3   • Melatonin 10 MG Tab Take 10 mg by mouth every bedtime.     • albuterol 108 (90 Base) MCG/ACT Aero Soln inhalation aerosol Inhale 2 Puffs every 6 hours as needed for Shortness of Breath. 8.5 g 6   • [DISCONTINUED] fluoxetine (PROZAC) 40 MG capsule Take 1 Capsule by mouth every day. APPOINTMENT REQUIRED FOR ADDITIONAL REFILLS. (Patient not taking: Reported on 9/24/2021) 90 Capsule 0   • oxybutynin (DITROPAN) 5 MG Tab Take 1 tablet by mouth 2 times a day. (Patient not taking: Reported on 8/6/2021) 90 tablet    • [DISCONTINUED] topiramate (TOPAMAX) 50 MG tablet Take 50 mg by mouth 2 times a day. (Patient not taking: Reported on 9/24/2021)     • [DISCONTINUED] Fluticasone Furoate-Vilanterol (BREO ELLIPTA) 200-25 MCG/INH AEROSOL POWDER, BREATH ACTIVATED Inhale 1 Puff every morning. (Patient not taking: Reported on 8/6/2021)     • [DISCONTINUED] Mirabegron ER (MYRBETRIQ) 50 MG TABLET SR 24 HR Take 50 mg by mouth every morning. (Patient not taking: Reported on 8/6/2021)     • [DISCONTINUED] omeprazole (PRILOSEC) 40 MG delayed-release capsule Take 1 capsule by mouth every day. (Patient not taking: Reported on 8/6/2021) 30 capsule 0   • [DISCONTINUED] ferrous sulfate 325 (65 Fe) MG tablet Take 1 tablet by mouth every morning with breakfast. (Patient not taking: Reported on 8/6/2021) 30 tablet 3   • baclofen (LIORESAL) 10 MG Tab Take 1 tablet by mouth 3 times a day. 90 tablet 2   • [DISCONTINUED] aspirin 81 MG EC tablet Take 81 mg by mouth every morning. (Patient not taking: Reported on 8/6/2021)     • [DISCONTINUED] Dulaglutide (TRULICITY) 1.5 MG/0.5ML Solution Pen-injector Inject 0.5 mL under the skin every 7 days. (Patient not taking: Reported on  "9/24/2021) 6 mL 3   • [DISCONTINUED] mycophenolate (CELLCEPT) 500 MG tablet Take 2 Tablets by mouth 2 times a day. (Patient not taking: Reported on 9/24/2021) 120 tablet 0   • [DISCONTINUED] levothyroxine (SYNTHROID) 100 MCG Tab Take 100 mcg by mouth Every morning on an empty stomach. (Patient not taking: Reported on 8/6/2021)       No facility-administered encounter medications on file as of 9/24/2021.     Review of Systems   Respiratory: Negative for cough and shortness of breath.    Cardiovascular: Positive for chest pain. Negative for palpitations.   Neurological: Negative for dizziness.        Objective:   /86 (BP Location: Right arm, Patient Position: Sitting, BP Cuff Size: Adult)   Pulse 90   Ht 1.651 m (5' 5\")   Wt 111 kg (245 lb)   SpO2 96%   BMI 40.77 kg/m²     Physical Exam  Constitutional:       General: She is not in acute distress.  Pulmonary:      Effort: Pulmonary effort is normal.   Neurological:      Mental Status: She is alert and oriented to person, place, and time.   Psychiatric:         Behavior: Behavior normal.       03/01/2017 ECHOCARDIOGRAM  Left ventricular ejection fraction 60%.  No valvular disease.    Cardiac event recorder 8/31/2020  ZioPatch Summary:   1. Sinus rhythm with appropriate heart rate range. Average 73, range 52 to 117 bpm.   2. Normal sinus node and AV node conduction normal. No pauses.   3. Rare PACs.   4. Rare PVCs.   5. No sustained rhythm changes. No atrial fibrillation/flutter.   6. 5 patient triggers, symptoms reported include fluttering and racing, shaky during sinus, 73 to 90 bpm..   Conclusion: Normal monitor.  Sinus rhythm with rare PVCs and PACs.  Symptoms during sinus rhythm.   Lexy Solomon MD [facility]     08/13/2018 EKG: Normal sinus rhythm, rate 83.  Reviewed by myself.    Assessment:     1. Inappropriate sinus tachycardia         Medical Decision Making:  Today's Assessment / Status / Plan:     Assessment  1.  Inappropriate sinus tachycardia.  2.  " Ablation for IST, 2016.  3.  Thyroid disorder, on chronic supplementation.  4.  Sleep apnea on CPAP.  5.  Morbid obesity.    Recommendation Discussion  1.  The patient's current cardiac status is stable from the standpoint of her IST and treatment..  2.  Will obtain medical records from [facility] ER visit.  3.  Continue current therapy.  4.  RTC 6 months.

## 2021-10-06 NOTE — CARE PLAN
Problem: Knowledge Deficit  Goal: Knowledge of disease process/condition, treatment plan, diagnostic tests, and medications will improve  Outcome: PROGRESSING AS EXPECTED  Education provided on plan of care. All questions answered. Call light is within reach.      Problem: Skin Integrity  Goal: Risk for impaired skin integrity will decrease  Outcome: PROGRESSING AS EXPECTED  Q2 hr turns in place.      [Chest Pain] : chest pain [Intolerance to Exercise] : intolerance to exercise [Cough] : cough [Congestion] : congestion [Shortness of Breath] : shortness of breath [Negative] : Genitourinary

## 2021-11-16 ENCOUNTER — OFFICE VISIT (OUTPATIENT)
Dept: URGENT CARE | Facility: CLINIC | Age: 31
End: 2021-11-16
Payer: MEDICARE

## 2021-11-16 VITALS
HEIGHT: 65 IN | BODY MASS INDEX: 39.65 KG/M2 | TEMPERATURE: 99 F | DIASTOLIC BLOOD PRESSURE: 88 MMHG | WEIGHT: 238 LBS | OXYGEN SATURATION: 98 % | RESPIRATION RATE: 18 BRPM | SYSTOLIC BLOOD PRESSURE: 138 MMHG | HEART RATE: 98 BPM

## 2021-11-16 DIAGNOSIS — R05.9 COUGH: ICD-10-CM

## 2021-11-16 DIAGNOSIS — H60.501 ACUTE OTITIS EXTERNA OF RIGHT EAR, UNSPECIFIED TYPE: ICD-10-CM

## 2021-11-16 PROCEDURE — 99214 OFFICE O/P EST MOD 30 MIN: CPT | Performed by: NURSE PRACTITIONER

## 2021-11-16 RX ORDER — TRAMADOL HYDROCHLORIDE 50 MG/1
50 TABLET ORAL 4 TIMES DAILY
COMMUNITY
End: 2022-06-29

## 2021-11-16 RX ORDER — NEOMYCIN SULFATE, POLYMYXIN B SULFATE AND HYDROCORTISONE 10; 3.5; 1 MG/ML; MG/ML; [USP'U]/ML
4 SUSPENSION/ DROPS AURICULAR (OTIC) 4 TIMES DAILY
Qty: 12 ML | Refills: 0 | Status: SHIPPED | OUTPATIENT
Start: 2021-11-16 | End: 2021-11-23

## 2021-11-16 ASSESSMENT — ENCOUNTER SYMPTOMS
FEVER: 0
PALPITATIONS: 0
NAUSEA: 0
HEADACHES: 0
ABDOMINAL PAIN: 0
CHILLS: 0
COUGH: 0
VOMITING: 0
SORE THROAT: 0
EYE PAIN: 0
DIZZINESS: 0
SHORTNESS OF BREATH: 1
MYALGIAS: 0

## 2021-11-16 ASSESSMENT — FIBROSIS 4 INDEX: FIB4 SCORE: 0.52

## 2021-11-17 NOTE — PROGRESS NOTES
Subjective:   Kristin Balderrama is a 31 y.o. female who presents for Otalgia (bilateral, SOB, palpations, fever 102, x 2 weeks. (Covid NEG/Flu Neg- from [facility] 11/05))      Otalgia   There is pain in the right ear. Chronicity: COVID , flu negative from outside faciltity  The current episode started 1 to 4 weeks ago. The problem occurs constantly. The problem has been gradually worsening. The maximum temperature recorded prior to her arrival was 102 - 102.9 F. The pain is at a severity of 6/10. The pain is moderate. Pertinent negatives include no abdominal pain, coughing, ear discharge, headaches, rash, sore throat or vomiting. Associated symptoms comments: Sob, fever  . She has tried acetaminophen and antibiotics for the symptoms. The treatment provided no relief. There is no history of a chronic ear infection or hearing loss.       Review of Systems   Constitutional: Negative for chills and fever.   HENT: Positive for ear pain. Negative for ear discharge and sore throat.    Eyes: Negative for pain.   Respiratory: Positive for shortness of breath. Negative for cough.    Cardiovascular: Negative for chest pain and palpitations.   Gastrointestinal: Negative for abdominal pain, nausea and vomiting.   Genitourinary: Negative for hematuria.   Musculoskeletal: Negative for myalgias.   Skin: Negative for rash.   Neurological: Negative for dizziness and headaches.       Medications:    • albuterol Aers  • baclofen Tabs  • busPIRone  • ipratropium-albuterol  • Melatonin Tabs  • neomycin-polymyxin-HC Susp  • OXcarbazepine Tabs  • oxybutynin Tabs  • traMADol Tabs  • ziprasidone Caps    Allergies: Depakote [divalproex sodium], Montelukast [singulair], Amitriptyline, Aripiprazole [abilify], Clindamycin, Flomax [tamsulosin hydrochloride], Metformin, Tamsulosin, Tape, Vancomycin, Wound dressing adhesive, Ampicillin, Ciprofloxacin, Keflex, Levofloxacin, Metronidazole, Penicillins, Sulfa drugs, and Valproic acid    Problem  List: Kristin Balderrama does not have any pertinent problems on file.    Surgical History:  Past Surgical History:   Procedure Laterality Date   • BOWEL STIMULATOR INSERTION  3/10/2021    Procedure: INSERTION, ELECTRODE LEADS AND PULSE GENERATOR, NEUROSTIMULATOR, SACRAL - REMOVAL OF INTERSTIM WITH REPLACEMENT OF SACRAL NEUROMODULATION DEVICE;  Surgeon: Joe Noyola M.D.;  Location: [facility];  Service: General   • MUSCLE BIOPSY Right 1/26/2017    Procedure: MUSCLE BIOPSY - THIGH;  Surgeon: Isidro Vigil M.D.;  Location: [facility];  Service:    • GASTROSCOPY WITH BALLOON DILATATION N/A 1/4/2017    Procedure: GASTROSCOPY WITH DILATATION;  Surgeon: Torres Vargas M.D.;  Location: [facility];  Service:    • BOWEL STIMULATOR INSERTION  7/13/2016    Procedure: BOWEL STIMULATOR INSERTION FOR PERMANENT INTERSTIM SACRAL IMPLANT;  Surgeon: Joe Noyola M.D.;  Location: [facility];  Service:    • RECOVERY  1/27/2016    Procedure: CATH LAB EP STUDY WITH SINUS NODE MODIFICATION LA;  Surgeon: Recoveryonly Surgery;  Location: SURGERY PRE-POST PROC UNIT [facility];  Service:    • OTHER CARDIAC SURGERY  1/2016    cardiac ablation   • NEURO DEST FACET L/S W/IG SNGL  4/21/2015    Performed by Reza Tabor at [facility]   • LUMBAR FUSION ANTERIOR  8/21/2012    Performed by SUSIE SAWANT at [facility]   • ALYSSA BY LAPAROSCOPY  8/29/2010    Performed by SHAYY JOHNS at [facility] ORS   • LAMINOTOMY     • OTHER ABDOMINAL SURGERY     • TONSILLECTOMY      tonsillectomy       Past Social Hx: Kristin Balderrama  reports that she has never smoked. She has never used smokeless tobacco. She reports previous drug use. Frequency: 7.00 times per week. Drug: Marijuana. She reports that she does not drink alcohol.     Past Family Hx:  Kristin Balderrama family history includes Genitourinary () Problems in her sister; Heart Disease in her maternal  "grandmother and mother; Hypertension in her maternal grandmother, maternal uncle, and mother; No Known Problems in her sister; Other in her mother and sister; Sleep Apnea in her mother.     Problem list, medications, and allergies reviewed by myself today in Epic.     Objective:     /88   Pulse 98   Temp 37.2 °C (99 °F)   Resp 18   Ht 1.651 m (5' 5\")   Wt 108 kg (238 lb)   SpO2 98%   BMI 39.61 kg/m²     Physical Exam  Nursing note reviewed.   Constitutional:       General: She is not in acute distress.     Appearance: She is well-developed.   HENT:      Head: Normocephalic and atraumatic.      Right Ear: Tympanic membrane and external ear normal. Swelling and tenderness present. No drainage. Tympanic membrane is not erythematous or bulging.      Left Ear: Tympanic membrane and external ear normal.      Nose: Nose normal.      Right Sinus: No maxillary sinus tenderness or frontal sinus tenderness.      Left Sinus: No maxillary sinus tenderness or frontal sinus tenderness.      Mouth/Throat:      Mouth: Mucous membranes are moist.      Pharynx: Uvula midline. No posterior oropharyngeal erythema.      Tonsils: No tonsillar exudate or tonsillar abscesses.   Eyes:      General:         Right eye: No discharge.         Left eye: No discharge.      Conjunctiva/sclera: Conjunctivae normal.   Cardiovascular:      Rate and Rhythm: Normal rate.   Pulmonary:      Effort: Pulmonary effort is normal. No respiratory distress.      Breath sounds: Normal breath sounds.   Abdominal:      General: There is no distension.   Musculoskeletal:         General: Normal range of motion.   Skin:     General: Skin is warm and dry.   Neurological:      General: No focal deficit present.      Mental Status: She is alert and oriented to person, place, and time. Mental status is at baseline.      Gait: Gait (gait at baseline) normal.   Psychiatric:         Judgment: Judgment normal.         Assessment/Plan:     Diagnosis and " associated orders:     1. Acute otitis externa of right ear, unspecified type  neomycin-polymyxin-HC (PEDIOTIC HC) [id number] Suspension   2. Cough        Comments/MDM:     Patient 31-year-old female present with stated above, patient with undiagnosed new problem. Patient does appear to have an erythematous tender otitis externa will treat with antibiotic drops. Sounds good auscultation bilaterally, pulse ox adequate, without fever, no red flags appreciated evidently patient tested negative for flu and Covid. It was explained today that due to the viral nature of the pt's illness, we will treat symptomatically today.   Encouraged OTC supportive meds PRN. Humidification, increase fluids, avoid night time dairy.   Discussed side effects of OTC meds and any prescribed.  Given precautionary s/sx that mandate immediate follow up and evaluation in the ED. Advised of risks of not doing so.    DDX, Supportive care, and indications for immediate follow-up discussed with patient.    Instructed to return to clinic or nearest emergency department if we are not available for any change in condition, further concerns, or worsening of symptoms.    The patient  and/or guardian demonstrated a good understanding and agreed with the treatment plan.               Please note that this dictation was created using voice recognition software. I have made a reasonable attempt to correct obvious errors, but I expect that there are errors of grammar and possibly content that I did not discover before finalizing the note.    This note was electronically signed by Felice SANTOS

## 2021-11-29 ENCOUNTER — OFFICE VISIT (OUTPATIENT)
Dept: URGENT CARE | Facility: CLINIC | Age: 31
End: 2021-11-29
Payer: MEDICARE

## 2021-11-29 VITALS
BODY MASS INDEX: 39.55 KG/M2 | TEMPERATURE: 98.4 F | HEART RATE: 106 BPM | HEIGHT: 65 IN | RESPIRATION RATE: 20 BRPM | WEIGHT: 237.4 LBS | DIASTOLIC BLOOD PRESSURE: 68 MMHG | SYSTOLIC BLOOD PRESSURE: 122 MMHG | OXYGEN SATURATION: 96 %

## 2021-11-29 DIAGNOSIS — H66.001 ACUTE SUPPURATIVE OTITIS MEDIA OF RIGHT EAR WITHOUT SPONTANEOUS RUPTURE OF TYMPANIC MEMBRANE, RECURRENCE NOT SPECIFIED: ICD-10-CM

## 2021-11-29 DIAGNOSIS — H60.501 ACUTE OTITIS EXTERNA OF RIGHT EAR, UNSPECIFIED TYPE: ICD-10-CM

## 2021-11-29 PROCEDURE — 99213 OFFICE O/P EST LOW 20 MIN: CPT | Performed by: PHYSICIAN ASSISTANT

## 2021-11-29 RX ORDER — IVABRADINE 7.5 MG/1
7.5 TABLET, FILM COATED ORAL EVERY EVENING
COMMUNITY
End: 2022-11-10 | Stop reason: SDUPTHER

## 2021-11-29 RX ORDER — DOXYCYCLINE HYCLATE 100 MG
100 TABLET ORAL 2 TIMES DAILY
Qty: 14 TABLET | Refills: 0 | Status: SHIPPED | OUTPATIENT
Start: 2021-11-29 | End: 2021-12-06

## 2021-11-29 ASSESSMENT — FIBROSIS 4 INDEX: FIB4 SCORE: 0.52

## 2021-11-30 NOTE — PROGRESS NOTES
"Subjective     Kristin Balderrama is a 31 y.o. female who presents with Otalgia (Severly infected right ear, tachaycardia, hx of various heart problems.)            Patient presents with:  Otalgia: Severly infected right ear.    PT states she needs some oral antibiotics as well as drops for this ear infection that has persisted for a few weeks.  Pt is allergic to a significant number of medications, states she can take doxycycline without issues.      Otalgia   There is pain in the right ear. This is a new problem. The current episode started 1 to 4 weeks ago. The problem occurs constantly. The problem has been waxing and waning. There has been no fever. The pain is severe. Associated symptoms include ear discharge (feels wet) and headaches. Pertinent negatives include no coughing, hearing loss, rhinorrhea or sore throat. She has tried ear drops for the symptoms. The treatment provided mild relief. Her past medical history is significant for a chronic ear infection. There is no history of hearing loss or a tympanostomy tube.       Review of Systems   Constitutional: Negative for chills and fever.   HENT: Positive for ear discharge (feels wet) and ear pain. Negative for congestion, hearing loss, rhinorrhea, sinus pain and sore throat.    Respiratory: Negative for cough.    Neurological: Positive for headaches.   All other systems reviewed and are negative.             Objective     /68 (BP Location: Right arm, Patient Position: Sitting, BP Cuff Size: Adult)   Pulse (!) 106   Temp 36.9 °C (98.4 °F) (Temporal)   Resp 20   Ht 1.651 m (5' 5\")   Wt 108 kg (237 lb 6.4 oz)   SpO2 96%   BMI 39.51 kg/m²      Physical Exam  Vitals and nursing note reviewed.   Constitutional:       General: She is not in acute distress.     Appearance: Normal appearance. She is well-developed. She is obese. She is not ill-appearing or toxic-appearing.   HENT:      Head: Normocephalic and atraumatic.      Right Ear: Swelling and " tenderness present. A middle ear effusion is present. There is no impacted cerumen. No foreign body. No mastoid tenderness. No PE tube. No hemotympanum. Tympanic membrane is erythematous and retracted. Tympanic membrane is not perforated or bulging.      Nose: Nose normal.      Mouth/Throat:      Lips: Pink.      Mouth: Mucous membranes are moist.      Pharynx: Uvula midline.   Eyes:      Extraocular Movements: Extraocular movements intact.      Conjunctiva/sclera: Conjunctivae normal.      Pupils: Pupils are equal, round, and reactive to light.   Cardiovascular:      Rate and Rhythm: Regular rhythm. Tachycardia present.      Heart sounds: Normal heart sounds.   Pulmonary:      Effort: Pulmonary effort is normal.   Abdominal:      Palpations: Abdomen is soft.   Musculoskeletal:         General: Normal range of motion.      Cervical back: Normal range of motion and neck supple.   Skin:     General: Skin is warm and dry.      Capillary Refill: Capillary refill takes less than 2 seconds.   Neurological:      Mental Status: She is alert and oriented to person, place, and time.      Gait: Gait normal.   Psychiatric:         Mood and Affect: Mood normal.                   Assessment & Plan              1. Acute otitis externa of right ear, unspecified type  doxycycline (VIBRAMYCIN) 100 MG Tab   2. Acute suppurative otitis media of right ear without spontaneous rupture of tympanic membrane, recurrence not specified  doxycycline (VIBRAMYCIN) 100 MG Tab     Patient was evaluated in clinic today while wearing appropriate personal protective equipment.      PT to begin prescription medications today as discussed for persistent ear infection.     PT should follow up with PCP in 1-2 days for re-evaluation if symptoms have not improved.      Discussed red flags and reasons to return to UC or ED.      Pt and/or family verbalized understanding of diagnosis and follow up instructions and was offered informational handout on  diagnosis.  PT discharged.

## 2021-12-01 ENCOUNTER — OFFICE VISIT (OUTPATIENT)
Dept: CARDIOLOGY | Facility: MEDICAL CENTER | Age: 31
End: 2021-12-01
Payer: MEDICARE

## 2021-12-01 ENCOUNTER — HOSPITAL ENCOUNTER (OUTPATIENT)
Dept: LAB | Facility: MEDICAL CENTER | Age: 31
End: 2021-12-01
Attending: NURSE PRACTITIONER
Payer: MEDICARE

## 2021-12-01 DIAGNOSIS — I48.91 ATRIAL FIBRILLATION, UNSPECIFIED TYPE (HCC): ICD-10-CM

## 2021-12-01 DIAGNOSIS — R07.89 OTHER CHEST PAIN: ICD-10-CM

## 2021-12-01 DIAGNOSIS — I49.3 PVC (PREMATURE VENTRICULAR CONTRACTION): ICD-10-CM

## 2021-12-01 DIAGNOSIS — G47.33 OSA (OBSTRUCTIVE SLEEP APNEA): ICD-10-CM

## 2021-12-01 DIAGNOSIS — Z98.890 H/O PRIOR ABLATION TREATMENT: ICD-10-CM

## 2021-12-01 DIAGNOSIS — Z86.79 HISTORY OF ATRIAL FIBRILLATION: ICD-10-CM

## 2021-12-01 DIAGNOSIS — I47.10 SVT (SUPRAVENTRICULAR TACHYCARDIA) (HCC): ICD-10-CM

## 2021-12-01 DIAGNOSIS — Z79.899 HIGH RISK MEDICATION USE: ICD-10-CM

## 2021-12-01 LAB — TSH SERPL DL<=0.005 MIU/L-ACNC: 3.95 UIU/ML (ref 0.38–5.33)

## 2021-12-01 PROCEDURE — 99214 OFFICE O/P EST MOD 30 MIN: CPT | Performed by: NURSE PRACTITIONER

## 2021-12-01 PROCEDURE — 84443 ASSAY THYROID STIM HORMONE: CPT

## 2021-12-01 PROCEDURE — 36415 COLL VENOUS BLD VENIPUNCTURE: CPT

## 2021-12-01 ASSESSMENT — FIBROSIS 4 INDEX: FIB4 SCORE: 0.52

## 2021-12-01 NOTE — PROGRESS NOTES
"Chief Complaint   Patient presents with   • Supraventricular Tachycardia (SVT)       Subjective     Kristin Balderrama is a 31 y.o. female who presents today with sinus tachycardia, and PAF s/p ablation (2016) with chronic Corlanor therapy.  Patient was last seen by Dr. Leon on 9/24/2021.  Patient was recently seen at [facility] on Tuesday for chest pain and tachycardia that started on Monday.  Patient reports she recently had echocardiogram at [facility] last month we will request these records.    In addition to above patient has following medical problems including TONYA, T2DM, schizophrenia/borderline personality disorder, and BMI of 40.    Today, patient reports she has had persistent sharp 7 out of 10 with normal heart rate and 9 out of 10 with heart rate in this 120s, aching chest pain with radiation to left arm.  Patient reports on Monday her heart rate temporarily increased to 120 before experiencing her chest pain.  Her troponin levels were checked at [facility] and were nondetectable 12 hours after initial chest pain onset.  Patient reports she has not been taking her Synthroid.  Her weight has increased by 10 pounds in the last few months.  Patient reports she had positive sleep study but never followed up with sleep medicine for CPAP device.  We will refer for today.    Based on physical examination findings, patient is euvolemic. No JVD, lungs are clear to auscultation, no pitting edema in bilateral lower extremities, no ascites.    Patient declined EKG in office today.    We will increase her Corlanor therapy.  Patient agreeable.  Discussed importance of treating comorbidities to support cardiac function.  Patient verbalizes understanding.      Past Medical History:   Diagnosis Date   • Abdominal pain    • Anginal syndrome     random chest pain especially with tachycardia   • Apnea, sleep    • Arrhythmia     \"sinus tachycardia\", cariologist, Dr. Kumar; ablation 2/2016   • Arthritis " "    osteo   • ASTHMA     when around smoke   • Atrial fibrillation (HCC)    • Back pain    • Borderline personality disorder (HCC)    • Breath shortness     with tachycardia   • Bronchitis    • Cardiac arrhythmia    • Chickenpox    • Chronic UTI 9/18/2010   • Cough    • Daytime sleepiness    • Dental disorder 03/08/2021    infected tooth   • Depression    • Diabetes (HCC)    • Diarrhea     incontinece   • Disorder of thyroid    • Fall    • Fatigue    • Frequent headaches    • Gasping for breath    • Gynecological disorder     PCOS   • Headache(784.0)    • Heart burn    • Heart murmur    • History of falling    • Indigestion    • Migraine    • Mitochondrial disease (HCC)    • 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HCC)    • Nausea    • Obesity    • Other fatigue 6/29/2020   • Pain 08-15-12    back, 7/10   • Painful joint    • PCOS (polycystic ovarian syndrome)    • Pneumonia 2012 12/2020   • Psychosis (HCC)    • Ringing in ears    • Scoliosis    • Shortness of breath     O2 as needed   • Sinus tachycardia 10/31/2013   • Sleep apnea     CPAP \"pulmonary doctor took me off mid year 2016\"   • Snoring    • Tonsillitis    • 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   • Urinary bladder disorder     Suprapubic cath   • Urinary incontinence    • Weakness    • Wears glasses      Past Surgical History:   Procedure Laterality Date   • BOWEL STIMULATOR INSERTION  3/10/2021    Procedure: INSERTION, ELECTRODE LEADS AND PULSE GENERATOR, NEUROSTIMULATOR, SACRAL - REMOVAL OF INTERSTIM WITH REPLACEMENT OF SACRAL NEUROMODULATION DEVICE;  Surgeon: Joe Noyola M.D.;  Location: [facility];  Service: General   • MUSCLE BIOPSY Right 1/26/2017    Procedure: MUSCLE BIOPSY - THIGH;  Surgeon: Isidro Vigil M.D.;  Location: [facility];  Service:    • GASTROSCOPY WITH BALLOON DILATATION N/A 1/4/2017    Procedure: GASTROSCOPY WITH DILATATION;  Surgeon: Torres Vargas M.D.;  Location: " SURGERY [facility];  Service:    • BOWEL STIMULATOR INSERTION  7/13/2016    Procedure: BOWEL STIMULATOR INSERTION FOR PERMANENT INTERSTIM SACRAL IMPLANT;  Surgeon: Joe Noyola M.D.;  Location: SURGERY [facility];  Service:    • RECOVERY  1/27/2016    Procedure: CATH LAB EP STUDY WITH SINUS NODE MODIFICATION ABHINAV;  Surgeon: Lina Surgery;  Location: SURGERY PRE-POST PROC UNIT [facility];  Service:    • OTHER CARDIAC SURGERY  1/2016    cardiac ablation   • NEURO DEST FACET L/S W/IG SNGL  4/21/2015    Performed by Reza Tabor at SURGERY [facility]   • LUMBAR FUSION ANTERIOR  8/21/2012    Performed by SUSIE SAWANT at SURGERY [facility] ORS   • AYLSSA BY LAPAROSCOPY  8/29/2010    Performed by SHAYY JOHNS at SURGERY [facility]   • LAMINOTOMY     • OTHER ABDOMINAL SURGERY     • TONSILLECTOMY      tonsillectomy     Family History   Problem Relation Age of Onset   • Hypertension Mother    • Sleep Apnea Mother    • Heart Disease Mother    • Other Mother         hypothryod   • Hypertension Maternal Uncle    • Heart Disease Maternal Grandmother    • Hypertension Maternal Grandmother    • No Known Problems Sister    • Other Sister         Narcolepsy;fibromyalsia;bone;nerve   • Genitourinary () Problems Sister         endometriosis     Social History     Socioeconomic History   • Marital status: Single     Spouse name: Not on file   • Number of children: Not on file   • Years of education: Not on file   • Highest education level: Not on file   Occupational History   • Not on file   Tobacco Use   • Smoking status: Never Smoker   • Smokeless tobacco: Never Used   Vaping Use   • Vaping Use: Never used   Substance and Sexual Activity   • Alcohol use: No     Alcohol/week: 0.0 oz   • Drug use: Not Currently     Frequency: 7.0 times per week     Types: Marijuana   • Sexual activity: Not Currently     Birth control/protection: Implant   Other Topics Concern   • Not on file   Social History Narrative     ** Merged History Encounter **          Social Determinants of Health     Financial Resource Strain: Medium Risk   • Difficulty of Paying Living Expenses: Somewhat hard   Food Insecurity: Food Insecurity Present   • Worried About Running Out of Food in the Last Year: Sometimes true   • Ran Out of Food in the Last Year: Sometimes true   Transportation Needs: Unmet Transportation Needs   • Lack of Transportation (Medical): No   • Lack of Transportation (Non-Medical): Yes   Physical Activity:    • Days of Exercise per Week: Not on file   • Minutes of Exercise per Session: Not on file   Stress:    • Feeling of Stress : Not on file   Social Connections:    • Frequency of Communication with Friends and Family: Not on file   • Frequency of Social Gatherings with Friends and Family: Not on file   • Attends Mosque Services: Not on file   • Active Member of Clubs or Organizations: Not on file   • Attends Club or Organization Meetings: Not on file   • Marital Status: Not on file   Intimate Partner Violence:    • Fear of Current or Ex-Partner: Not on file   • Emotionally Abused: Not on file   • Physically Abused: Not on file   • Sexually Abused: Not on file   Housing Stability:    • Unable to Pay for Housing in the Last Year: Not on file   • Number of Places Lived in the Last Year: Not on file   • Unstable Housing in the Last Year: Not on file     Allergies   Allergen Reactions   • Depakote [Divalproex Sodium] Unspecified     Muscle spasms/muscle pain and weakness     • Montelukast [Singulair] Unspecified     Cardiac effusion   • Amitriptyline Unspecified     Headaches     • Aripiprazole [Abilify] Unspecified     Headaches/muscle twitching     • Clindamycin Nausea          • Flomax [Tamsulosin Hydrochloride] Swelling   • Metformin Unspecified     Increased lactic acid      • Tamsulosin Swelling     Swelling of legs   • Tape Rash     Tears skin off  coban with Tegaderm tape ok intermittently  RXN=ongoing   • Vancomycin  "Itching     Pt becomes flushed in face and chest.   RXN=7/10/16   • Wound Dressing Adhesive Hives     By pt report   • Ampicillin Rash     Pt reports that she received a rash    • Ciprofloxacin Rash         • Keflex Rash     Pt states she gets a rash with this medication  Tolerates ceftriaxone  Can take with Benadryl   • Levofloxacin Unspecified     Leg muscle cramps   • Metronidazole Rash     \"Vision problems\"   • Penicillins Hives     Can take with Benadryl   • Sulfa Drugs Itching and Myalgia     Muscle pain and weakness   • Valproic Acid Rash     .     Outpatient Encounter Medications as of 2021   Medication Sig Dispense Refill   • diphenhydrAMINE HCl (BENADRYL PO) Take 50 mg by mouth every day. Night time     • ivabradine (CORLANOR) 5 MG Tab tablet Take 1 Tablet by mouth 2 times a day with meals. 60 Tablet 5   • ivabradine (CORLANOR) 7.5 MG Tab tablet Take 7.5 mg by mouth 1 time a day as needed.     • doxycycline (VIBRAMYCIN) 100 MG Tab Take 1 Tablet by mouth 2 times a day for 7 days. 14 Tablet 0   • ziprasidone (GEODON) 40 MG Cap TAKE 1 CAPSULE BY MOUTH TWICE A DAY. APPOINTMENT REQUIRED FOR ADDITIONAL REFILLS.  CALL [phone]. . 60 Capsule 1   • traMADol (ULTRAM) 50 MG Tab Take 50 mg by mouth every four hours as needed.     • ipratropium-albuterol (DUONEB) 0.5-2.5 (3) MG/3ML nebulizer solution Take 3 mL by nebulization every four hours as needed for Shortness of Breath. Nebulizer 360 mL 2   • Melatonin 10 MG Tab Take 10 mg by mouth every bedtime.     • albuterol 108 (90 Base) MCG/ACT Aero Soln inhalation aerosol Inhale 2 Puffs every 6 hours as needed for Shortness of Breath. 8.5 g 6   • [DISCONTINUED] OXcarbazepine (TRILEPTAL) 300 MG Tab Take 1 Tablet by mouth 2 times a day. APPOINTMENT REQUIRED FOR ADDITIONAL REFILLS.  CALL [phone]. (Patient not taking: Reported on 2021) 60 Tablet 1   • [DISCONTINUED] oxybutynin (DITROPAN) 5 MG Tab Take 1 tablet by mouth 2 times a " "day. (Patient not taking: Reported on 8/6/2021) 90 tablet    • [DISCONTINUED] busPIRone (BUSPAR) 30 MG tablet Take 30 mg by mouth 2 times a day. (Patient not taking: Reported on 11/16/2021)     • [DISCONTINUED] baclofen (LIORESAL) 10 MG Tab Take 1 tablet by mouth 3 times a day. (Patient not taking: Reported on 11/16/2021) 90 tablet 2     No facility-administered encounter medications on file as of 12/1/2021.     Review of Systems   Constitutional: Negative for fever, malaise/fatigue and weight loss.        +weight gain   Eyes: Negative for blurred vision.   Respiratory: Negative for cough and shortness of breath.    Cardiovascular: Positive for chest pain. Negative for palpitations, orthopnea, claudication, leg swelling and PND.   Gastrointestinal: Negative for abdominal pain, blood in stool, nausea and vomiting.   Genitourinary: Negative for dysuria, frequency and hematuria.   Musculoskeletal: Negative for falls and myalgias.   Neurological: Negative for dizziness, tingling and loss of consciousness.   Endo/Heme/Allergies: Does not bruise/bleed easily.              Objective     /60 (BP Location: Left arm, Patient Position: Sitting, BP Cuff Size: Adult)   Resp 16   Ht 1.651 m (5' 5\")   Wt 109 kg (240 lb 6.4 oz)   BMI 40.00 kg/m²     Physical Exam  Vitals reviewed.   Constitutional:       Appearance: She is well-developed.      Comments: BMI 40   HENT:      Head: Normocephalic and atraumatic.   Eyes:      Pupils: Pupils are equal, round, and reactive to light.   Neck:      Vascular: No JVD.   Cardiovascular:      Rate and Rhythm: Regular rhythm. Tachycardia present.      Heart sounds: Normal heart sounds. No murmur heard.  No friction rub. No gallop.    Pulmonary:      Effort: Pulmonary effort is normal. No respiratory distress.      Breath sounds: Normal breath sounds.   Abdominal:      General: Bowel sounds are normal. There is no distension.      Palpations: Abdomen is soft.   Musculoskeletal:      " Right lower leg: No edema.      Left lower leg: No edema.   Skin:     General: Skin is warm and dry.      Findings: No erythema.   Neurological:      General: No focal deficit present.      Mental Status: She is alert and oriented to person, place, and time.   Psychiatric:         Mood and Affect: Mood normal.         Behavior: Behavior normal.              Lab Results   Component Value Date/Time    CHOLSTRLTOT 172 01/20/2021 05:33 AM    LDL 98 01/20/2021 05:33 AM    HDL 48 01/20/2021 05:33 AM    TRIGLYCERIDE 130 01/20/2021 05:33 AM       Lab Results   Component Value Date/Time    SODIUM 138 06/23/2021 10:35 AM    POTASSIUM 4.0 06/23/2021 10:35 AM    CHLORIDE 109 06/23/2021 10:35 AM    CO2 17 (L) 06/23/2021 10:35 AM    GLUCOSE 122 (H) 06/23/2021 10:35 AM    BUN 10 06/23/2021 10:35 AM    CREATININE 0.70 06/23/2021 10:35 AM    CREATININE 0.75 (L) 07/20/2010 11:00 AM    BUNCREATRAT 19 07/20/2010 11:00 AM    GLOMRATE >59 07/20/2010 11:00 AM     Lab Results   Component Value Date/Time    ALKPHOSPHAT 81 06/23/2021 10:35 AM    ASTSGOT 11 (L) 06/23/2021 10:35 AM    ALTSGPT 14 06/23/2021 10:35 AM    TBILIRUBIN 0.3 06/23/2021 10:35 AM        03/01/2017 ECHOCARDIOGRAM  Left ventricular ejection fraction 60%.  No valvular disease.     ECHO  3/6/2021  FINDINGS  Left Ventricle  Normal left ventricular size, wall thickness, and systolic function.   Left ventricular ejection fraction is visually estimated to be 60%.     Right Ventricle  Normal right ventricular size and systolic function.     Right Atrium  Normal right atrial size.     Left Atrium  Normal left atrial size.     Mitral Valve  Structurally normal mitral valve without significant stenosis or   regurgitation.     Aortic Valve  Structurally normal aortic valve without significant stenosis or   regurgitation.     Tricuspid Valve  Structurally normal tricuspid valve. No tricuspid stenosis. Mild   tricuspid regurgitation. Right ventricular systolic pressure is   estimated  to be 18 mmHg. Right atrial pressure is estimated to be 3   mmHg.     Pulmonic Valve  Structurally normal pulmonic valve without significant stenosis or   regurgitation.     Pericardium  Normal pericardium without effusion.     Aorta  Normal aortic root for body surface area.         Myocardial Perfusion  4/6/2017   No myocardial infarct or inducible ischemia.   Normal LEFT ventricular function.    Cardiac event recorder 8/31/2020  ZioPatch Summary:   1. Sinus rhythm with appropriate heart rate range. Average 73, range 52 to 117 bpm.   2. Normal sinus node and AV node conduction normal. No pauses.   3. Rare PACs.   4. Rare PVCs.   5. No sustained rhythm changes. No atrial fibrillation/flutter.   6. 5 patient triggers, symptoms reported include fluttering and racing, shaky during sinus, 73 to 90 bpm..   Conclusion: Normal monitor.  Sinus rhythm with rare PVCs and PACs.  Symptoms during sinus rhythm.   Assessment & Plan     1. SVT (supraventricular tachycardia) (HCC)  EKG    Referral to Pulmonary and Sleep Medicine    TSH   2. Atrial fibrillation, unspecified type (HCC)  Referral to Pulmonary and Sleep Medicine    TSH   3. PVC (premature ventricular contraction)  Referral to Pulmonary and Sleep Medicine    TSH   4. TONYA (obstructive sleep apnea)  Referral to Pulmonary and Sleep Medicine    TSH   5. History of atrial fibrillation and cardiomyopathy?     6. Other chest pain     7. High risk medication use     8. H/O prior ablation treatment         Medical Decision Making: Today's Assessment/Status/Plan:        Inappropriate sinus tachycardia s/p ablation (2016)  -Increase Corlanor 10 mg daily  -Continue metoprolol 12.5 mg daily  -In November and [facility] last week for chest pain. We will request records from [place] and [facility] recently treated at [place]    Hypothyroid  -Check TSH.  -Follow-up with PCP    TONYA  -Patient reports CPAP non-compliance  -We discussed importance of treating nocturnal  hypoxia due to causing increased risk for cardiac disease; patient verbalizes understanding.  -Refer to sleep medicine for follow-up    Severe obesity; BMI 40  -Discussed importance of lifestyle and diet optimization.  Patient verbalizes understanding.    FU in clinic in 3 months sooner if needed.    Patient verbalizes understanding and agrees with the plan of care.       PLEASE NOTE: This Note was created using voice recognition Software. I have made every reasonable attempt to correct obvious errors, but I expect that there are errors of grammar and possibly content that I did not discover before finalizing the note

## 2021-12-06 VITALS
DIASTOLIC BLOOD PRESSURE: 60 MMHG | HEART RATE: 99 BPM | WEIGHT: 240.4 LBS | HEIGHT: 65 IN | SYSTOLIC BLOOD PRESSURE: 110 MMHG | BODY MASS INDEX: 40.05 KG/M2 | RESPIRATION RATE: 16 BRPM

## 2021-12-06 ASSESSMENT — ENCOUNTER SYMPTOMS
DIZZINESS: 0
MYALGIAS: 0
ORTHOPNEA: 0
FEVER: 0
BLURRED VISION: 0
SHORTNESS OF BREATH: 0
PND: 0
FALLS: 0
BLOOD IN STOOL: 0
LOSS OF CONSCIOUSNESS: 0
TINGLING: 0
ABDOMINAL PAIN: 0
NAUSEA: 0
WEIGHT LOSS: 0
BRUISES/BLEEDS EASILY: 0
CLAUDICATION: 0
VOMITING: 0
COUGH: 0
PALPITATIONS: 0

## 2021-12-11 ASSESSMENT — ENCOUNTER SYMPTOMS
SORE THROAT: 0
RHINORRHEA: 0
SINUS PAIN: 0
COUGH: 0
HEADACHES: 1
FEVER: 0
CHILLS: 0

## 2022-01-11 ENCOUNTER — HOSPITAL ENCOUNTER (OUTPATIENT)
Facility: MEDICAL CENTER | Age: 32
End: 2022-01-11
Payer: COMMERCIAL

## 2022-01-28 ENCOUNTER — OFFICE VISIT (OUTPATIENT)
Dept: SLEEP MEDICINE | Facility: MEDICAL CENTER | Age: 32
End: 2022-01-28
Payer: MEDICARE

## 2022-01-28 ENCOUNTER — SLEEP STUDY (OUTPATIENT)
Dept: SLEEP MEDICINE | Facility: MEDICAL CENTER | Age: 32
End: 2022-01-28
Attending: STUDENT IN AN ORGANIZED HEALTH CARE EDUCATION/TRAINING PROGRAM
Payer: MEDICARE

## 2022-01-28 VITALS
HEART RATE: 95 BPM | HEIGHT: 65 IN | WEIGHT: 238 LBS | BODY MASS INDEX: 39.65 KG/M2 | OXYGEN SATURATION: 95 % | RESPIRATION RATE: 16 BRPM | DIASTOLIC BLOOD PRESSURE: 74 MMHG | SYSTOLIC BLOOD PRESSURE: 106 MMHG

## 2022-01-28 DIAGNOSIS — G47.33 OSA (OBSTRUCTIVE SLEEP APNEA): ICD-10-CM

## 2022-01-28 DIAGNOSIS — E66.01 MORBID OBESITY (HCC): ICD-10-CM

## 2022-01-28 DIAGNOSIS — F51.04 CHRONIC INSOMNIA: ICD-10-CM

## 2022-01-28 PROCEDURE — 95811 POLYSOM 6/>YRS CPAP 4/> PARM: CPT | Performed by: STUDENT IN AN ORGANIZED HEALTH CARE EDUCATION/TRAINING PROGRAM

## 2022-01-28 PROCEDURE — 99204 OFFICE O/P NEW MOD 45 MIN: CPT | Mod: 25 | Performed by: STUDENT IN AN ORGANIZED HEALTH CARE EDUCATION/TRAINING PROGRAM

## 2022-01-28 RX ORDER — TRAZODONE HYDROCHLORIDE 100 MG/1
100 TABLET ORAL
Status: ON HOLD | COMMUNITY
End: 2023-02-09 | Stop reason: SDUPTHER

## 2022-01-28 RX ORDER — ZOLPIDEM TARTRATE 5 MG/1
5 TABLET ORAL NIGHTLY PRN
Qty: 2 TABLET | Refills: 0 | Status: SHIPPED | OUTPATIENT
Start: 2022-01-28 | End: 2022-01-28

## 2022-01-28 RX ORDER — ZOLPIDEM TARTRATE 5 MG/1
5 TABLET ORAL NIGHTLY PRN
Qty: 2 TABLET | Refills: 0 | Status: SHIPPED | OUTPATIENT
Start: 2022-01-28 | End: 2022-01-29

## 2022-01-28 ASSESSMENT — PATIENT HEALTH QUESTIONNAIRE - PHQ9: CLINICAL INTERPRETATION OF PHQ2 SCORE: 0

## 2022-01-28 ASSESSMENT — FIBROSIS 4 INDEX: FIB4 SCORE: 0.52

## 2022-01-28 NOTE — PATIENT INSTRUCTIONS
Sleep Apnea  Sleep apnea affects breathing during sleep. It causes breathing to stop for a short time or to become shallow. It can also increase the risk of:  · Heart attack.  · Stroke.  · Being very overweight (obese).  · Diabetes.  · Heart failure.  · Irregular heartbeat.  The goal of treatment is to help you breathe normally again.  What are the causes?  There are three kinds of sleep apnea:  · Obstructive sleep apnea. This is caused by a blocked or collapsed airway.  · Central sleep apnea. This happens when the brain does not send the right signals to the muscles that control breathing.  · Mixed sleep apnea. This is a combination of obstructive and central sleep apnea.  The most common cause of this condition is a collapsed or blocked airway. This can happen if:  · Your throat muscles are too relaxed.  · Your tongue and tonsils are too large.  · You are overweight.  · Your airway is too small.  What increases the risk?  · Being overweight.  · Smoking.  · Having a small airway.  · Being older.  · Being male.  · Drinking alcohol.  · Taking medicines to calm yourself (sedatives or tranquilizers).  · Having family members with the condition.  What are the signs or symptoms?  · Trouble staying asleep.  · Being sleepy or tired during the day.  · Getting angry a lot.  · Loud snoring.  · Headaches in the morning.  · Not being able to focus your mind (concentrate).  · Forgetting things.  · Less interest in sex.  · Mood swings.  · Personality changes.  · Feelings of sadness (depression).  · Waking up a lot during the night to pee (urinate).  · Dry mouth.  · Sore throat.  How is this diagnosed?  · Your medical history.  · A physical exam.  · A test that is done when you are sleeping (sleep study). The test is most often done in a sleep lab but may also be done at home.  How is this treated?    · Sleeping on your side.  · Using a medicine to get rid of mucus in your nose (decongestant).  · Avoiding the use of alcohol,  medicines to help you relax, or certain pain medicines (narcotics).  · Losing weight, if needed.  · Changing your diet.  · Not smoking.  · Using a machine to open your airway while you sleep, such as:  ? An oral appliance. This is a mouthpiece that shifts your lower jaw forward.  ? A CPAP device. This device blows air through a mask when you breathe out (exhale).  ? An EPAP device. This has valves that you put in each nostril.  ? A BPAP device. This device blows air through a mask when you breathe in (inhale) and breathe out.  · Having surgery if other treatments do not work.  It is important to get treatment for sleep apnea. Without treatment, it can lead to:  · High blood pressure.  · Coronary artery disease.  · In men, not being able to have an erection (impotence).  · Reduced thinking ability.  Follow these instructions at home:  Lifestyle  · Make changes that your doctor recommends.  · Eat a healthy diet.  · Lose weight if needed.  · Avoid alcohol, medicines to help you relax, and some pain medicines.  · Do not use any products that contain nicotine or tobacco, such as cigarettes, e-cigarettes, and chewing tobacco. If you need help quitting, ask your doctor.  General instructions  · Take over-the-counter and prescription medicines only as told by your doctor.  · If you were given a machine to use while you sleep, use it only as told by your doctor.  · If you are having surgery, make sure to tell your doctor you have sleep apnea. You may need to bring your device with you.  · Keep all follow-up visits as told by your doctor. This is important.  Contact a doctor if:  · The machine that you were given to use during sleep bothers you or does not seem to be working.  · You do not get better.  · You get worse.  Get help right away if:  · Your chest hurts.  · You have trouble breathing in enough air.  · You have an uncomfortable feeling in your back, arms, or stomach.  · You have trouble talking.  · One side of your  "body feels weak.  · A part of your face is hanging down.  These symptoms may be an emergency. Do not wait to see if the symptoms will go away. Get medical help right away. Call your local emergency services (911 in the [place] Do not drive yourself to the hospital.  Summary  · This condition affects breathing during sleep.  · The most common cause is a collapsed or blocked airway.  · The goal of treatment is to help you breathe normally while you sleep.  This information is not intended to replace advice given to you by your health care provider. Make sure you discuss any questions you have with your health care provider.  Document Released: 09/26/2009 Document Revised: 10/04/2019 Document Reviewed: 08/13/2019  [employer] Patient Education © 2020 [employer]  CPAP and BPAP Information  CPAP and BPAP are methods of helping a person breathe with the use of air pressure. CPAP stands for \"continuous positive airway pressure.\" BPAP stands for \"bi-level positive airway pressure.\" In both methods, air is blown through your nose or mouth and into your air passages to help you breathe well.  CPAP and BPAP use different amounts of pressure to blow air. With CPAP, the amount of pressure stays the same while you breathe in and out. With BPAP, the amount of pressure is increased when you breathe in (inhale) so that you can take larger breaths. Your health care provider will recommend whether CPAP or BPAP would be more helpful for you.  Why are CPAP and BPAP treatments used?  CPAP or BPAP can be helpful if you have:  · Sleep apnea.  · Chronic obstructive pulmonary disease (COPD).  · Heart failure.  · Medical conditions that weaken the muscles of the chest including muscular dystrophy, or neurological diseases such as amyotrophic lateral sclerosis (ALS).  · Other problems that cause breathing to be weak, abnormal, or difficult.  CPAP is most commonly used for obstructive sleep apnea (TONYA) to keep the airways from collapsing when " the muscles relax during sleep.  How is CPAP or BPAP administered?  Both CPAP and BPAP are provided by a small machine with a flexible plastic tube that attaches to a plastic mask. You wear the mask. Air is blown through the mask into your nose or mouth. The amount of pressure that is used to blow the air can be adjusted on the machine. Your health care provider will determine the pressure setting that should be used based on your individual needs.  When should CPAP or BPAP be used?  In most cases, the mask only needs to be worn during sleep. Generally, the mask needs to be worn throughout the night and during any daytime naps. People with certain medical conditions may also need to wear the mask at other times when they are awake. Follow instructions from your health care provider about when to use the machine.  What are some tips for using the mask?    · Because the mask needs to be snug, some people feel trapped or closed-in (claustrophobic) when first using the mask. If you feel this way, you may need to get used to the mask. One way to do this is by holding the mask loosely over your nose or mouth and then gradually applying the mask more snugly. You can also gradually increase the amount of time that you use the mask.  · Masks are available in various types and sizes. Some fit over your mouth and nose while others fit over just your nose. If your mask does not fit well, talk with your health care provider about getting a different one.  · If you are using a mask that fits over your nose and you tend to breathe through your mouth, a chin strap may be applied to help keep your mouth closed.  · The CPAP and BPAP machines have alarms that may sound if the mask comes off or develops a leak.  · If you have trouble with the mask, it is very important that you talk with your health care provider about finding a way to make the mask easier to tolerate. Do not stop using the mask. Stopping the use of the mask could have  a negative impact on your health.  What are some tips for using the machine?  · Place your CPAP or BPAP machine on a secure table or stand near an electrical outlet.  · Know where the on/off switch is located on the machine.  · Follow instructions from your health care provider about how to set the pressure on your machine and when you should use it.  · Do not eat or drink while the CPAP or BPAP machine is on. Food or fluids could get pushed into your lungs by the pressure of the CPAP or BPAP.  · Do not smoke. Tobacco smoke residue can damage the machine.  · For home use, CPAP and BPAP machines can be rented or purchased through home health care companies. Many different brands of machines are available. Renting a machine before purchasing may help you find out which particular machine works well for you.  · Keep the CPAP or BPAP machine and attachments clean. Ask your health care provider for specific instructions.  Get help right away if:  · You have redness or open areas around your nose or mouth where the mask fits.  · You have trouble using the CPAP or BPAP machine.  · You cannot tolerate wearing the CPAP or BPAP mask.  · You have pain, discomfort, and bloating in your abdomen.  Summary  · CPAP and BPAP are methods of helping a person breathe with the use of air pressure.  · Both CPAP and BPAP are provided by a small machine with a flexible plastic tube that attaches to a plastic mask.  · If you have trouble with the mask, it is very important that you talk with your health care provider about finding a way to make the mask easier to tolerate.  This information is not intended to replace advice given to you by your health care provider. Make sure you discuss any questions you have with your health care provider.  Document Released: 09/15/2005 Document Revised: 04/08/2020 Document Reviewed: 11/06/2017  [employer] Patient Education © 2020 [employer]

## 2022-01-28 NOTE — PROGRESS NOTES
[facility] Consult Note     Date: 1/28/2022 / Time: 9:48 AM      Thank you for requesting a sleep medicine consultation on Kristin Balderrama at the sleep center. Presents today with the chief complaints of snoring, occasional excessive daytime sleepiness, trouble falling and staying asleep. She is referred by VANIA Lopez  [address] [place] [address],  [place] [id number] for evaluation and treatment of sleep disorder breathing.     HISTORY OF PRESENT ILLNESS:     Kristin Balderrama is a 31 y.o. female with SVT, chronic pain, GERD, scoliosis, schizophrenia, chronic insomnia, asthma, obstructive sleep apnea, PTSD, morbid obesity.  Presents to Sleep Clinic for evaluation of sleep apnea and trouble falling asleep and staying asleep.    She reports several years ago she was diagnosed with obstructive sleep apnea.  Following diagnosis she was placed on CPAP machine.  States she had issues getting used to using CPAP machine due to claustrophobia.  She did try nasal mask.  Due to her not using it frequently insurance required her to return her machine.  She underwent a sleep study approximately 3 years ago but had poor follow-up and did not start PAP therapy following sleep study.  She reports she is open to restarting Pap therapy.  Feels that as time passed she feels more comfortable with Pap therapy.    She continues to have issues with her sleep.  Difficulty falling asleep and staying asleep.  States she will wake up feeling short of breath and feeling like she is to catch her breath.  In addition she will awaken with headaches and feeling unrested.  She is in the process of transitioning to a new psychiatrist for management of her schizophrenia.  Due to her poor sleep she is tired all the time.  Reports she is tired throughout the day and will nap on occasion.  Due to her awakenings at night and trouble getting back to sleep she often sleeps from 11 PM until 11 AM intermittently.    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  With her potential job she will need to work business hours and with her current schedule and poor sleep she feels that she would not be able to function properly.      She does report sleep paralysis about once a week.  She does endorse hypnopompic hallucinations at times.  She feels this is difficult to discern if it is related to her sleep or her schizophrenia.  States sometimes she will see people or objects in her room that are not there.    As per supplemental questionnaire to be scanned or imported into chart:    [place] Sleepiness Score: 6    Sleep Schedule  Bedtime: Weekday & Weekend 10 pm   Wake time: Weekday & Weekend 9-11 am  Sleep-onset latency: 1-1.5 hr   Awakenings from sleep: 3  Difficulty falling back asleep: yes  Bedroom partner: No   Naps: Yes    DAYTIME SYMPTOMS:   Excessive daytime sleepiness: Yes  Daytime fatigue: Yes  Difficulty concentrating: Yes  Memory problems: Yes  Irritability:Yes  Work/school performance issues: No   Sleepiness with driving: No   Caffeine/stimulant use: Yes  Alcohol use:No     SLEEP RELATED SYMPTOMS  Snoring: Yes  Witnessed apnea or gasping/choking: Yes  Dry mouth or mouth breathing: Yes  Sweating: Yes  Teeth grinding/biting: Yes  Morning headaches: Yes  Refreshed Upon Awakening: No      SLEEP RELATED BEHAVIORS:  Parasomnias (walking, talking, eating, violence): No   Leg kicking: No   Restless legs - \"urge to move\": Yes  Nightmares: No  Recurrent: No   Dream enactment: No      NARCOLEPSY:  Cataplexy: No   Sleep paralysis: Yes  Sleep attacks: No   Hypnagogic/hypnopompic hallucinations: Yes    MEDICAL HISTORY  Past Medical History:   Diagnosis Date   • Abdominal pain    • Anginal syndrome     random chest pain especially with tachycardia   • Apnea, sleep    • Arrhythmia     \"sinus tachycardia\", cariologist, Dr. Kumar; ablation 2/2016   • Arthritis     osteo   • ASTHMA     when around " "smoke   • Atrial fibrillation (HCC)    • Back pain    • Borderline personality disorder (HCC)    • Breath shortness     with tachycardia   • Bronchitis    • Cardiac arrhythmia    • Chickenpox    • Chronic UTI 9/18/2010   • Cough    • Daytime sleepiness    • Dental disorder 03/08/2021    infected tooth   • Depression    • Diabetes (HCC)    • Diarrhea     incontinece   • Disorder of thyroid    • Fall    • Fatigue    • Frequent headaches    • Gasping for breath    • Gynecological disorder     PCOS   • Headache(784.0)    • Heart burn    • Heart murmur    • History of falling    • Indigestion    • Migraine    • Mitochondrial disease (HCC)    • 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HCC)    • Nausea    • Obesity    • Other fatigue 6/29/2020   • Pain 08-15-12    back, 7/10   • Painful joint    • PCOS (polycystic ovarian syndrome)    • Pneumonia 2012 12/2020   • Psychosis (HCC)    • Ringing in ears    • Scoliosis    • Shortness of breath     O2 as needed   • Sinus tachycardia 10/31/2013   • Sleep apnea     CPAP \"pulmonary doctor took me off mid year 2016\"   • Snoring    • Tonsillitis    • 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HCC)    • Tuberculosis     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   • Urinary bladder disorder     Suprapubic cath   • Urinary incontinence    • Weakness    • Wears glasses         SURGICAL HISTORY  Past Surgical History:   Procedure Laterality Date   • BOWEL STIMULATOR INSERTION  3/10/2021    Procedure: INSERTION, ELECTRODE LEADS AND PULSE GENERATOR, NEUROSTIMULATOR, SACRAL - REMOVAL OF INTERSTIM WITH REPLACEMENT OF SACRAL NEUROMODULATION DEVICE;  Surgeon: Joe Noyola M.D.;  Location: [facility];  Service: General   • MUSCLE BIOPSY Right 1/26/2017    Procedure: MUSCLE BIOPSY - THIGH;  Surgeon: Isidro Vigil M.D.;  Location: [facility];  Service:    • GASTROSCOPY WITH BALLOON DILATATION N/A 1/4/2017    Procedure: GASTROSCOPY WITH DILATATION;  Surgeon: Torres Vargas M.D.;  Location: [facility];  Service:    • BOWEL STIMULATOR INSERTION  7/13/2016    Procedure: BOWEL STIMULATOR INSERTION FOR PERMANENT INTERSTIM SACRAL IMPLANT;  Surgeon: Joe Noyola M.D.;  Location: SURGERY [facility];  Service:    • RECOVERY  1/27/2016    Procedure: CATH LAB EP STUDY WITH SINUS NODE MODIFICATION ABHINAV;  Surgeon: [facility];  Location: SURG[place] PRE[facility] PROC [facility];  Service:    • OTHER CARDIAC SURGERY  1/2016    cardiac ablation   • NEURO DEST FACET L/S W/IG SNGL  4/21/2015    Performed by Reza Tabor at SURGERY [facility]   • LUMBAR FUSION ANTERIOR  8/21/2012    Performed by SUSIE SAWANT at SURGERY [facility]   • ALYSSA BY LAPAROSCOPY  8/29/2010    Performed by SHAYY JOHNS at SURGERY [facility]   • LAMINOTOMY     • OTHER ABDOMINAL SURGERY     • TONSILLECTOMY      tonsillectomy        FAMILY HISTORY  Family History   Problem Relation Age of Onset   • Hypertension Mother    • Sleep Apnea Mother    • Heart Disease Mother    • Other Mother         hypothryod   • Hypertension Maternal Uncle    • Heart Disease Maternal Grandmother    • Hypertension Maternal Grandmother    • No Known Problems Sister    • Other Sister         Narcolepsy;fibromyalsia;bone;nerve   • Genitourinary () Problems Sister         endometriosis       SOCIAL HISTORY  Social History     Socioeconomic History   • Marital status: Single     Spouse name: Not on file   • Number of children: Not on file   • Years of education: Not on file   • Highest education level: Not on file   Occupational History   • Not on file   Tobacco Use   • Smoking status: Never Smoker   • Smokeless tobacco: Never Used   Vaping Use   • Vaping Use: Never used   Substance and Sexual Activity   • Alcohol use: No     Alcohol/week: 0.0 oz   • Drug use: Not Currently     Frequency: 7.0 times per week     Types: Marijuana   • Sexual activity: Not Currently     Birth control/protection: Implant   Other Topics Concern   • Not on file    Social History Narrative    ** Merged History Encounter **          Social Determinants of Health     Financial Resource Strain: Medium Risk   • Difficulty of Paying Living Expenses: Somewhat hard   Food Insecurity: Food Insecurity Present   • Worried About Running Out of Food in the Last Year: Sometimes true   • Ran Out of Food in the Last Year: Sometimes true   Transportation Needs: Unmet Transportation Needs   • Lack of Transportation (Medical): No   • Lack of Transportation (Non-Medical): Yes   Physical Activity:    • Days of Exercise per Week: Not on file   • Minutes of Exercise per Session: Not on file   Stress:    • Feeling of Stress : Not on file   Social Connections:    • Frequency of Communication with Friends and Family: Not on file   • Frequency of Social Gatherings with Friends and Family: Not on file   • Attends Holiness Services: Not on file   • Active Member of Clubs or Organizations: Not on file   • Attends Club or Organization Meetings: Not on file   • Marital Status: Not on file   Intimate Partner Violence:    • Fear of Current or Ex-Partner: Not on file   • Emotionally Abused: Not on file   • Physically Abused: Not on file   • Sexually Abused: Not on file   Housing Stability:    • Unable to Pay for Housing in the Last Year: Not on file   • Number of Places Lived in the Last Year: Not on file   • Unstable Housing in the Last Year: Not on file        Occupation: Currently not working but planning to start Job.     CURRENT MEDICATIONS  Current Outpatient Medications   Medication Sig Dispense Refill   • traZODone (DESYREL) 100 MG Tab Take 100 mg by mouth every evening.     • diphenhydrAMINE HCl (BENADRYL PO) Take 50 mg by mouth every day. Night time     • ivabradine (CORLANOR) 5 MG Tab tablet Take 1 Tablet by mouth 2 times a day with meals. 60 Tablet 5   • ivabradine (CORLANOR) 7.5 MG Tab tablet Take 7.5 mg by mouth 1 time a day as needed.     • ziprasidone (GEODON) 40 MG Cap TAKE 1 CAPSULE BY  "MOUTH TWICE A DAY. APPOINTMENT REQUIRED FOR ADDITIONAL REFILLS.  CALL [phone]. . 60 Capsule 1   • ipratropium-albuterol (DUONEB) 0.5-2.5 (3) MG/3ML nebulizer solution Take 3 mL by nebulization every four hours as needed for Shortness of Breath. Nebulizer 360 mL 2   • Melatonin 10 MG Tab Take 10 mg by mouth every bedtime.     • albuterol 108 (90 Base) MCG/ACT Aero Soln inhalation aerosol Inhale 2 Puffs every 6 hours as needed for Shortness of Breath. 8.5 g 6   • traMADol (ULTRAM) 50 MG Tab Take 50 mg by mouth every four hours as needed. (Patient not taking: Reported on 2022)       No current facility-administered medications for this visit.       REVIEW OF SYSTEMS  Constitutional: Denies fevers, Denies weight changes  Ears/Nose/Throat/Mouth: Denies nasal congestion or sore throat   Cardiovascular: Denies chest pain  Respiratory: Denies shortness of breath, Denies cough  Gastrointestinal/Hepatic: Denies nausea, vomiting  Sleep: see HPI    Physical Examination:  Vitals/ General Appearance:   Weight/BMI: Body mass index is 39.61 kg/m².  /74 (BP Location: Right arm, Patient Position: Sitting, BP Cuff Size: Adult)   Pulse 95   Resp 16   Ht 1.651 m (5' 5\")   Wt 108 kg (238 lb)   SpO2 95%   Vitals:    22 0931   BP: 106/74   BP Location: Right arm   Patient Position: Sitting   BP Cuff Size: Adult   Pulse: 95   Resp: 16   SpO2: 95%   Weight: 108 kg (238 lb)   Height: 1.651 m (5' 5\")       Pt. is alert and oriented to time, place and person. Cooperative and in no apparent distress.     Constitutional: Alert, no distress, well-groomed.  Skin: No rashes in visible areas.  Eye: Round. Conjunctiva clear, lids normal. No icterus.   ENT EXAM  Nasal alae/valves collapsible: No   Nasal septum deviation: Yes  Nasal turbinate hypertrophy: Left: Grade 1   Right: Grade 1  Hard palate narrow: No   Hard palate high: Yes  Soft palate/uvula (Mallampati score): 3  Tongue Scalloping: Yes  Retrognathia: No "   Micrognathia: No   Cardiovascular:no murmus/gallops/rubs, normal S1 and S2 heart sounds, regular rate and rhythm  Pulmonary:Clear to auscultation, No wheezes, No crackles.  Neurologic:Awake, alert and oriented x 3, Normal age appropriate gait, No involuntary motions.  Extremities: No clubbing, cyanosis, or edema       ASSESSMENT AND PLAN   Kristin Balderrama is a 31 y.o. female with SVT, chronic pain, GERD, scoliosis, schizophrenia, chronic insomnia, asthma, obstructive sleep apnea, PTSD, morbid obesity.  Presents to Sleep Clinic for evaluation of sleep apnea and trouble falling asleep and staying asleep.    1. Kristin Balderrama  has a history of obstructive Sleep Apnea (TONYA). Kristin Balderrama has symptoms of snoring, choking/gasping during sleep, witnessed apnea, dry mouth, morning headaches, unrefreshed upon awakening. These  interfere with activities of daily living.     Pt has risk factors for TONYA include obesity, thick neck, and crowded oropharynx.     The pathophysiology of TONYA and the increased risk of cardiovascular morbidity from untreated TONYA is discussed in detail with the patient. She  also has heart arrhythmia, schizophrenia, GERD, fatigue which can be worsened by TONYA.      We have discussed diagnostic options including in-laboratory, attended polysomnography and home sleep testing. We have also discussed treatment options including airway pressurization, reconstructive otolaryngologic surgery, dental appliances and weight management.     Subsequently,treatment options will be discussed based on the diagnostic study. Meanwhile, She is urged to avoid supine sleep, weight gain and alcoholic beverages since all of these can worsen TONYA. She is cautioned against drowsy driving. If She feels sleepy while driving, advised must pull over for a break/nap, rather than persist on the road, in the interest of Pt's own safety and that of others on the road.    Prescribed Ambien for night of sleep study  only.     Plan  -  She  will be scheduled for an overnight PSG to assess sleep related breathing disorder.  - Follow up 1-2 weeks after sleep study to discuss results and treatment options moving forward   -Advised to reach out via MyChart or by phone with any questions or concerns.     2.Chronic Insomnia   With prolonged sleep latency, frequent awakenings with difficulty falling back asleep and feeling this is impacting daily functioning for years meets criteria for chronic insomnia. Discussed potential causes of insomnia and importance of having a routine around bedtime. Advised to avoid laying in bed for more then 20-30 min if unable to fall asleep.    Suspect her sleep maintenance insomnia secondary to untreated sleep apnea.  In terms of her sleep initiation insomnia advised her to discuss this when she has reestablished with psychiatry. We would want to coordinate with her psychiatrist regarding therapy for insomnia.       Regarding treatment of other past medical problems and general health maintenance,  Pt is urged to follow up with PCP.      Please note portions of this record was created using voice recognition software. I have made every reasonable attempt to correct obvious errors, but I expect that there are errors of grammar and possibly content I did not discover before finalizing the note.

## 2022-01-31 NOTE — PROCEDURES
MONTAGE: Standard  STUDY TYPE: Split Night    RECORDING TECHNIQUE:   After the scalp was prepared, gold plated electrodes were applied to the scalp according to the International 10-20 System. EEG (electroencephalogram) was continuously monitored from the O1-M2, O2-M1, C3-M2, C4-M1, F3-M2, and F4-M1. EOGs (electrooculograms) were monitored by electrodes placed at the left and right outer canthi. Chin EMG (electromyogram) was monitored by electrodes placed on the mentalis and sub-mentalis muscles. Nasal and oral airflow were monitored using a triple port thermocouple as well as oronasal pressure transducer. Respiratory effort was measured by inductive plethysmography technology employing abdominal and thoracic belts. Blood oxygen saturation and pulse were monitored by pulse oximetry. Heart rhythm was monitored by surface electrocardiogram. Leg EMG was studied using surface electrodes placed on left and right anterior tibialis. A microphone was used to monitor tracheal sounds and snoring. Body position was monitored and documented by technician observation.     SCORING CRITERIA:   A modification of the AASM manual for scoring of sleep and associated events was used. Obstructive apneas were scored by cessation of airflow for at least 10 seconds with continuing respiratory effort. Central apneas were scored by cessation of airflow for at least 10 seconds with no respiratory effort. Hypopneas were scored by a 30% or more reduction in airflow for at least 10 seconds accompanied by arterial oxygen desaturation of 3% or an arousal. For CMS (Medicare) patients, per AASM rule 1B, hypopneas are scored by 30% with mild reduction in airflow for at least 10 seconds accompanied by arterial saturation decreased at 4%.    DIAGNOSTIC  Study start time was 10:00:19 PM. Diagnostic recording time was 216 minutes with a total sleep time of 174 minutes resulting in a sleep efficiency of 80.37%%. Sleep latency from the start of the study  was 36 minutes and the latency from sleep to REM was 72 minutes. In total,22 arousals were scored for an arousal index of 7.6.  Respiratory:  There were a total of 0 apneas consisting of 0 obstructive apneas, 0 mixed apneas, and 0 central apneas. A total of 42 hypopneas were scored. The apnea index was 0.00 per hour and the hypopnea index was 14.48 per hour resulting in an overall AHI of 14.48. AHI during rem was 52.2 and AHI while supine was 0.00.  No supine sleep seen.  Oximetry:  There was a mean oxygen saturation of 93.0%. The minimum oxygen saturation during NREM sleep was84.0% and in REM was 83.0. Time spent during sleep with oxygen saturations <88% was 7.3 minutes.   Cardiac:  The highest heart rate seen while awake was 108 BPM while the highest heart rate during sleep was 94 BPM with an average sleeping heart rate of 75 BPM.  Limb Movements:  There were a total of 0 PLMs during sleep, which resulted in a PLM index of 0.0. There were 0 PLMs associated with arousals which resulted in a PLMS arousal index of 0.0.    TREATMENT:  Treatment recording time was 4h 13.5m (253 minutes) with a total sleep time of 4h 6.0m (246 minutes) resulting in a sleep efficiency of 97.0%. Sleep latency from the start of treatment was 00 minutes and REM latency from sleep onset was 1h 25.0m. The patient had 42 arousals in total for an arousal index of 10.2.  Respiratory:   There were 5 apneas in total consisting of 0 obstructive apneas, 5 central apneas, and 0 mixed apneas for an apnea index of 1.22. The patient had 35 hypopneas in total, which resulted in a hypopnea index of 8.54. The overall AHI was 9.76, with a REM AHI of 16.22, and a supine AHI of 0.00.  No supine sleep seen.  Oximetry:  The mean SaO2 during treatment was 93.0%. The minimum oxygen saturation in NREM was87.0 % and in REM was 87.0%. Patient spent 1.1 minutes of TST with SaO2 <88%.  Cardiac:  The highest heart rate during sleep was 91 BPM with an average sleeping  heart rate of 74BPM.  Limb Movements:  There were a total of 0 PLMS during titration sleep time that resulted in an index of 0.0. There were 6 PLMS associated with arousals. This resulted in a PLM arousal index of 1.5.  Titration:   CPAP was tried from 5 to 11cm H2O.  This was a fully attended sleep study. This test was technically adequate. This patient was titrated on CPAP starting at 5 cm of water pressure. Patient was titrated up to 11 cm of water pressure. Patient did best at 8 cm of water pressure. Patient spent 94 minutes at that pressure and their AHI was 5.08 which is considered Mild obstructive sleep apnea.     Impression:  1.  Mild obstructive sleep apnea overall AHI of 14.5, respiratory events clustered around REM sleep  2.  No supine sleep was seen during study  3.  Adequate response to PAP therapy  4.  Oxygenation improved with PAP    Recommendations:  I recommend auto CPAP of 8-12 cm with small DreamWear mask.   No supplemental oxygen needed while on Pap therapy.    I also recommend 30 day compliance download to assess the efficacy to the recommended pressure, measure leak, apnea hypopnea index and compliance for further outpatient monitoring and management of CPAP therapy. In some cases alternative treatment options may be proven effective in resolving sleep apnea. These options include upper airway surgery, the use of a dental orthotic, weight loss orpositional therapy. Clinical correlation is required. In general patients with sleep apnea are advised to avoid alcohol, sedatives and not to operate a motor vehicle while drowsy.  Untreated sleep apnea increases the risk for cardiovascular and neurovascular disease.

## 2022-02-02 ENCOUNTER — HOSPITAL ENCOUNTER (EMERGENCY)
Facility: MEDICAL CENTER | Age: 32
End: 2022-02-02
Attending: EMERGENCY MEDICINE
Payer: MEDICARE

## 2022-02-02 ENCOUNTER — OFFICE VISIT (OUTPATIENT)
Dept: URGENT CARE | Facility: CLINIC | Age: 32
End: 2022-02-02
Payer: MEDICARE

## 2022-02-02 ENCOUNTER — APPOINTMENT (OUTPATIENT)
Dept: RADIOLOGY | Facility: IMAGING CENTER | Age: 32
End: 2022-02-02
Attending: PHYSICIAN ASSISTANT
Payer: MEDICARE

## 2022-02-02 VITALS
BODY MASS INDEX: 39.65 KG/M2 | RESPIRATION RATE: 20 BRPM | WEIGHT: 238 LBS | HEART RATE: 96 BPM | SYSTOLIC BLOOD PRESSURE: 120 MMHG | TEMPERATURE: 97.2 F | OXYGEN SATURATION: 96 % | DIASTOLIC BLOOD PRESSURE: 82 MMHG | HEIGHT: 65 IN

## 2022-02-02 VITALS
WEIGHT: 235.01 LBS | HEIGHT: 66 IN | OXYGEN SATURATION: 98 % | TEMPERATURE: 98.2 F | BODY MASS INDEX: 37.77 KG/M2 | DIASTOLIC BLOOD PRESSURE: 88 MMHG | HEART RATE: 77 BPM | RESPIRATION RATE: 18 BRPM | SYSTOLIC BLOOD PRESSURE: 140 MMHG

## 2022-02-02 DIAGNOSIS — K59.00 CONSTIPATION, UNSPECIFIED CONSTIPATION TYPE: ICD-10-CM

## 2022-02-02 DIAGNOSIS — K59.03 DRUG-INDUCED CONSTIPATION: ICD-10-CM

## 2022-02-02 DIAGNOSIS — R14.0 ABDOMINAL DISTENSION, GASEOUS: ICD-10-CM

## 2022-02-02 DIAGNOSIS — N83.201 CYST OF RIGHT OVARY: ICD-10-CM

## 2022-02-02 DIAGNOSIS — R14.0 ABDOMINAL BLOATING: ICD-10-CM

## 2022-02-02 LAB
APPEARANCE UR: NORMAL
BILIRUB UR STRIP-MCNC: NEGATIVE MG/DL
COLOR UR AUTO: NORMAL
GLUCOSE UR STRIP.AUTO-MCNC: NEGATIVE MG/DL
INT CON NEG: NEGATIVE
INT CON POS: POSITIVE
KETONES UR STRIP.AUTO-MCNC: NEGATIVE MG/DL
LEUKOCYTE ESTERASE UR QL STRIP.AUTO: NORMAL
NITRITE UR QL STRIP.AUTO: NEGATIVE
PH UR STRIP.AUTO: 5.5 [PH] (ref 5–8)
POC URINE PREGNANCY TEST: NEGATIVE
PROT UR QL STRIP: NEGATIVE MG/DL
RBC UR QL AUTO: NEGATIVE
SP GR UR STRIP.AUTO: 1.03
UROBILINOGEN UR STRIP-MCNC: 0.2 MG/DL

## 2022-02-02 PROCEDURE — 81002 URINALYSIS NONAUTO W/O SCOPE: CPT | Performed by: PHYSICIAN ASSISTANT

## 2022-02-02 PROCEDURE — 96372 THER/PROPH/DIAG INJ SC/IM: CPT

## 2022-02-02 PROCEDURE — A9270 NON-COVERED ITEM OR SERVICE: HCPCS | Performed by: EMERGENCY MEDICINE

## 2022-02-02 PROCEDURE — 74019 RADEX ABDOMEN 2 VIEWS: CPT | Mod: TC | Performed by: PHYSICIAN ASSISTANT

## 2022-02-02 PROCEDURE — 99283 EMERGENCY DEPT VISIT LOW MDM: CPT

## 2022-02-02 PROCEDURE — 99215 OFFICE O/P EST HI 40 MIN: CPT | Performed by: PHYSICIAN ASSISTANT

## 2022-02-02 PROCEDURE — 700102 HCHG RX REV CODE 250 W/ 637 OVERRIDE(OP): Performed by: EMERGENCY MEDICINE

## 2022-02-02 PROCEDURE — 81025 URINE PREGNANCY TEST: CPT | Performed by: PHYSICIAN ASSISTANT

## 2022-02-02 PROCEDURE — 700111 HCHG RX REV CODE 636 W/ 250 OVERRIDE (IP): Performed by: EMERGENCY MEDICINE

## 2022-02-02 RX ORDER — DOCUSATE SODIUM 100 MG/1
100 CAPSULE, LIQUID FILLED ORAL 2 TIMES DAILY
Qty: 60 CAPSULE | Refills: 0 | Status: SHIPPED | OUTPATIENT
Start: 2022-02-02 | End: 2022-08-31

## 2022-02-02 RX ORDER — POLYETHYLENE GLYCOL 3350 17 G/17G
17 POWDER, FOR SOLUTION ORAL DAILY
Qty: 3 EACH | Refills: 0 | Status: SHIPPED | OUTPATIENT
Start: 2022-02-02 | End: 2022-02-05

## 2022-02-02 RX ORDER — LACTULOSE 20 G/30ML
30 SOLUTION ORAL 2 TIMES DAILY
Qty: 473 ML | Refills: 0 | Status: SHIPPED | OUTPATIENT
Start: 2022-02-02 | End: 2022-08-31

## 2022-02-02 RX ADMIN — METHYLNALTREXONE BROMIDE 12 MG: 12 INJECTION, SOLUTION SUBCUTANEOUS at 19:30

## 2022-02-02 RX ADMIN — MAGNESIUM CITRATE 296 ML: 1.75 LIQUID ORAL at 19:30

## 2022-02-02 ASSESSMENT — ENCOUNTER SYMPTOMS
ANOREXIA: 1
BLOOD IN STOOL: 0
MYALGIAS: 0
CONSTIPATION: 1
SORE THROAT: 0
SHORTNESS OF BREATH: 0
COUGH: 0
NAUSEA: 0
VOMITING: 0
HEADACHES: 0
FEVER: 0
DIARRHEA: 0
BELCHING: 0
FLANK PAIN: 0
ABDOMINAL PAIN: 1
FLATUS: 0
CHILLS: 0

## 2022-02-02 ASSESSMENT — FIBROSIS 4 INDEX
FIB4 SCORE: 0.52
FIB4 SCORE: 0.52

## 2022-02-02 NOTE — PROGRESS NOTES
"Subjective     Kristin Balderrama is a 31 y.o. female who presents with Bloating (Pt states woke up with stomach pain/bloating. Hasn't passed stool x2 days.  ( HX pt has (R) cyst/surgery 02/14/22))            Abdominal Pain  This is a new problem. The current episode started today. The onset quality is sudden. The problem occurs constantly. The problem has been unchanged. The pain is located in the epigastric region. The pain is at a severity of 7/10. The quality of the pain is colicky. The abdominal pain does not radiate. Associated symptoms include anorexia and constipation (No BM x 2 days). Pertinent negatives include no belching, diarrhea, dysuria, fever, flatus, frequency, headaches, hematuria, melena, myalgias, nausea or vomiting. The pain is aggravated by eating. The pain is relieved by nothing. She has tried nothing for the symptoms. Her past medical history is significant for abdominal surgery (cholecystectomy ).     The patient was seen in the ED 4 days ago for RLQ abdominal pain- full work-up was performed including CT of abdomen, Pelvic Ultrasound which showed a right ovarian cyst without signs of torsion per ED notes and no signs of peritonitis.  She is scheduled to have the cyst removed in 12 days. She became concerned today because she woke up with abdominal bloating that is unusual for her. She also reports epigastric pain with eating.  Pain is also worse with movement.   The patient denies drinking alcohol. She has history of cholecystectomy.     Past Medical History:   Diagnosis Date   • Abdominal pain    • Anginal syndrome     random chest pain especially with tachycardia   • Apnea, sleep    • Arrhythmia     \"sinus tachycardia\", cariologist, Dr. Kumar; ablation 2/2016   • Arthritis     osteo   • ASTHMA     when around smoke   • Atrial fibrillation (HCC)    • Back pain    • Borderline personality disorder (HCC)    • Breath shortness     with tachycardia   • Bronchitis    • Cardiac arrhythmia    • " "Chickenpox    • Chronic UTI 9/18/2010   • Cough    • Daytime sleepiness    • Dental disorder 03/08/2021    infected tooth   • Depression    • Diabetes (HCC)    • Diarrhea     incontinece   • Disorder of thyroid    • Fall    • Fatigue    • Frequent headaches    • Gasping for breath    • Gynecological disorder     PCOS   • Headache(784.0)    • Heart burn    • Heart murmur    • History of falling    • Indigestion    • Migraine    • Mitochondrial disease (HCC)    • 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[facility])    • Nausea    • Obesity    • Other fatigue 6/29/2020   • Pain 08-15-12    back, 7/10   • Painful joint    • PCOS (polycystic ovarian syndrome)    • Pneumonia 2012 12/2020   • Psychosis ([facility])    • Ringing in ears    • Scoliosis    • Shortness of breath     O2 as needed   • Sinus tachycardia 10/31/2013   • Sleep apnea     CPAP \"pulmonary doctor took me off mid year 2016\"   • Snoring    • Tonsillitis    • 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[facility])    • Tuberculosis     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   • Urinary bladder disorder     Suprapubic cath   • Urinary incontinence    • Weakness    • Wears glasses        Past Surgical History:   Procedure Laterality Date   • BOWEL STIMULATOR INSERTION  3/10/2021    Procedure: INSERTION, ELECTRODE LEADS AND PULSE GENERATOR, NEUROSTIMULATOR, SACRAL - REMOVAL OF INTERSTIM WITH REPLACEMENT OF SACRAL NEUROMODULATION DEVICE;  Surgeon: Joe Noyola M.D.;  Location: [facility];  Service: General   • MUSCLE BIOPSY Right 1/26/2017    Procedure: MUSCLE BIOPSY - THIGH;  Surgeon: Isidro Vigil M.D.;  Location: [facility];  Service:    • GASTROSCOPY WITH BALLOON DILATATION N/A 1/4/2017    Procedure: GASTROSCOPY WITH DILATATION;  Surgeon: Torres Vargas M.D.;  Location: [facility];  Service:    • BOWEL STIMULATOR INSERTION  7/13/2016    Procedure: BOWEL STIMULATOR INSERTION FOR PERMANENT INTERSTIM SACRAL IMPLANT;  Surgeon: Joe Noyola M.D.;  Location: [facility];  Service:    • RECOVERY  1/27/2016    Procedure: CATH LAB EP STUDY WITH SINUS NODE MODIFICATION ABHINAV;  Surgeon: [facility];  Location: SURGERY PRE-POST PROC UNIT [facility];  Service:    • OTHER CARDIAC SURGERY  1/2016    cardiac ablation   • NEURO DEST FACET L/S W/IG SNGL  4/21/2015    Performed by Reza Tabor at SUR[facility]ERY [facility]   • LUMBAR FUSION ANTERIOR  8/21/2012    Performed by SUSIE SAWANT at SURGERY [facility]   • ALYSSA BY LAPAROSCOPY  8/29/2010    Performed by SHAYY JOHNS at SURGERY [facility]   • LAMINOTOMY     • OTHER ABDOMINAL SURGERY     • TONSILLECTOMY      tonsillectomy       Family History   Problem Relation Age of Onset   • Hypertension Mother    • Sleep Apnea Mother    • Heart Disease Mother    • Other Mother         hypothryod   • Hypertension Maternal Uncle    • Heart Disease Maternal Grandmother    • Hypertension Maternal Grandmother    • No Known Problems Sister    • Other Sister         Narcolepsy;fibromyalsia;bone;nerve   • Genitourinary () Problems Sister         endometriosis       Allergies   Allergen Reactions   • Depakote [Divalproex Sodium] Unspecified     Muscle spasms/muscle pain and weakness     • Doxycycline Anaphylaxis and Vomiting     Other reaction(s): pustules/blisters   • Montelukast [Singulair] Unspecified     Cardiac effusion   • Amitriptyline Unspecified     Headaches     • Aripiprazole [Abilify] Unspecified     Headaches/muscle twitching     • Clindamycin Nausea          • Flomax [Tamsulosin Hydrochloride] Swelling   • Metformin Unspecified     Increased lactic acid     Other reaction(s): itching and rash/nausea vomiting   • Tamsulosin Swelling     Swelling of legs   • Tape Rash     Tears skin off  coban with Tegaderm tape ok intermittently  RXN=ongoing   • Vancomycin Itching     Pt becomes flushed in face and chest.   RXN=7/10/16   • Wound Dressing Adhesive Hives     By pt report   • Ampicillin Rash     Pt reports that she  "received a rash    • Ciprofloxacin Rash         • Keflex Rash     Pt states she gets a rash with this medication  Tolerates ceftriaxone  Can take with Benadryl   • Levofloxacin Unspecified     Leg muscle cramps   • Metronidazole Rash     \"Vision problems\"   • Penicillins Hives     Can take with Benadryl   • Sulfa Drugs Itching and Myalgia     Muscle pain and weakness   • Valproic Acid Rash     .       Medications, Allergies, and current problem list reviewed today in Epic    Review of Systems   Constitutional: Negative for chills, fever and malaise/fatigue.   HENT: Negative for congestion and sore throat.    Respiratory: Negative for cough and shortness of breath.    Cardiovascular: Negative for chest pain and leg swelling.   Gastrointestinal: Positive for abdominal pain, anorexia and constipation (No BM x 2 days). Negative for blood in stool, diarrhea, flatus, melena, nausea and vomiting.   Genitourinary: Negative for dysuria, flank pain, frequency, hematuria and urgency.   Musculoskeletal: Negative for myalgias.   Skin: Negative for rash.   Neurological: Negative for headaches.         All other systems reviewed and are negative.         Objective     /82   Pulse 96   Temp 36.2 °C (97.2 °F)   Resp 20   Ht 1.651 m (5' 5\")   Wt 108 kg (238 lb)   SpO2 96%   BMI 39.61 kg/m²      Physical Exam  Constitutional:       General: She is not in acute distress.     Appearance: She is not ill-appearing.   HENT:      Head: Normocephalic and atraumatic.   Eyes:      General: No scleral icterus.     Conjunctiva/sclera: Conjunctivae normal.   Cardiovascular:      Rate and Rhythm: Normal rate and regular rhythm.   Pulmonary:      Effort: Pulmonary effort is normal. No respiratory distress.      Breath sounds: No stridor. No wheezing.   Abdominal:      General: There is no distension.      Palpations: Abdomen is soft. There is no mass.      Tenderness: There is abdominal tenderness (moderate TTP in epigastric region. No " rigidity or guarding ). There is no right CVA tenderness, left CVA tenderness or rebound.      Comments: TTP over RLQ where right ovarian cyst lies.    Skin:     General: Skin is warm and dry.      Coloration: Skin is not jaundiced.   Neurological:      General: No focal deficit present.      Mental Status: She is alert and oriented to person, place, and time.   Psychiatric:         Mood and Affect: Mood normal.         Behavior: Behavior normal.         Thought Content: Thought content normal.         Judgment: Judgment normal.             UA: trace of leuks. No nitrates or blood.  HCG- negative.       hcg- negative      2/2/2022 9:58 AM     HISTORY/REASON FOR EXAM:  Distention; abdominal bloating and distention        TECHNIQUE/EXAM DESCRIPTION AND NUMBER OF VIEWS:  3 view(s) of the abdomen.     COMPARISON: 6/23/2021     FINDINGS:     Moderate amount of stool throughout the colon.     Postsurgical change in the lumbar spine.     Nonspecific nonobstructive bowel gas pattern. No dilated gas-filled loop of small bowel.     No portal venous gas or pneumatosis.     No definite free intraperitoneal air but evaluation is limited on supine radiograph.     IMPRESSION:        Moderate amount of stool throughout the colon.    Assessment & Plan        1. Abdominal distension, gaseous  POCT Urinalysis    POCT Pregnancy    [id number] VIEWS    lactulose 20 GM/30ML Solution   2. Constipation, unspecified constipation type  POCT Urinalysis    POCT Pregnancy    [id number] VIEWS    lactulose 20 GM/30ML Solution   3. Cyst of right ovary  POCT Urinalysis    POCT Pregnancy    [id number] VIEWS            X-ray reviewed. Agree with RAD above.  Suspect symptoms are related to constipation and increased gas.   RX for Lactulose. OTC Simethicone.  Increased water intake. OTC Colace or Glycerine suppositories   ED if drastic worsening in pain or symptoms.    Differential diagnoses, Supportive care, and indications for immediate  follow-up discussed with patient.   Pathogenesis of diagnosis discussed including typical length and natural progression.   Instructed to return to clinic or nearest emergency department for any change in condition, further concerns, or worsening of symptoms.    My total time spent caring for the patient on the day of the encounter was 55 minutes.   This does not include time spent on separately billable procedures/tests.    The patient demonstrated a good understanding and agreed with the treatment plan.    Ruth Ann Ramesh P.A.-C.

## 2022-02-03 NOTE — DISCHARGE PLANNING
KRYSTIAN assisted Pt with calling [employer] for transportation home as she says that it did not work for her last time she tried.  Jonel with [employer] set up an [employer] to pick this Pt up Pt in two minutes.  KRYSTIAN went bedside and escorted Pt to meet her [employer].

## 2022-02-03 NOTE — ED NOTES
Pt ambualte back from triage to ORT1. Pt states she was seen at Urgent care today and given lactulose with no results pt here d/t continued discomfort. And SNT to palpation. BS (+) et normal.

## 2022-02-03 NOTE — DISCHARGE INSTRUCTIONS
Take the MiraLAX for the next 3 days, and start Colace twice daily.  Continue the twice daily Colace until you are no longer on any oral narcotic pain medication such as tramadol, Norco, or Percocet.  Although tramadol is not a typical narcotic.  It has strong enough narcotic effects that it can cause drug-induced constipation by attaching to opiate receptors in the GI tract.

## 2022-02-03 NOTE — ED TRIAGE NOTES
Kristin Balderrama  31 y.o.  female  Chief Complaint   Patient presents with   • Constipation     Last BM 2 days ago, Xray done at UC today & showed moderate amount of stool & gas in bowel. Given 60mL of lactulose with no BM. Pt states she is passing minimal gas & is uncomfortable. Abdomen is soft. No hx of bowel obstruction, none of xray today.       Patient educated on triage process, to alert staff if any changes in condition.

## 2022-02-03 NOTE — ED PROVIDER NOTES
ED Provider Note    Scribed for Emery Piña M.D. by Emery Piña M.D.. 2/2/2022,  6:45 PM.    CHIEF COMPLAINT  Chief Complaint   Patient presents with   • Constipation     Last BM 2 days ago, Xray done at  today & showed moderate amount of stool & gas in bowel. Given 60mL of lactulose with no BM. Pt states she is passing minimal gas & is uncomfortable. Abdomen is soft. No hx of bowel obstruction, none of xray today.       HPI  Kristin Balderrama is a 31 y.o. female who presents to the Emergency Department reporting constipation, with a last bowel movement 2 days ago.  She had an urgent care x-ray today that showed moderate stool and gas.  She was given 60 mL of lactulose at urgent care, but has had no bowel movement, so presented to the emergency department.  She does not have a history of bowel obstruction.  She was started within the past week or so on tramadol.  She does not complain of fevers or chills or nausea or vomiting.    REVIEW OF SYSTEMS  See HPI for further details. All other systems are negative.     PAST MEDICAL HISTORY   has a past medical history of Abdominal pain, Anginal syndrome, Apnea, sleep, Arrhythmia, Arthritis, ASTHMA, Atrial fibrillation (HCC), Back pain, Borderline personality disorder ([facility]), Breath shortness, Bronchitis (02/08/2022), Cardiac arrhythmia, Chickenpox, Chronic UTI (9/18/2010), Cough, Daytime sleepiness, Dental disorder (03/08/2021), Depression, Diabetes (HCC), Diarrhea, Disorder of thyroid, Fall, Fatigue, Frequent headaches, Gasping for breath, Gynecological disorder, Headache(784.0), Heart burn, Heart murmur, History of falling, Indigestion, Migraine, Mitochondrial disease (HCC), 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HCC), Nausea, Obesity, Other fatigue (6/29/2020), Pain (08-15-12), Painful joint, PCOS (polycystic ovarian syndrome), Pneumonia (2012 12/2020), Psychosis (HCC), Ringing in ears, Scoliosis, Shortness of breath, Sinus tachycardia (10/31/2013), Sleep  apnea, Snoring, Tonsillitis, 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HCC), Tuberculosis, Urinary bladder disorder, Urinary incontinence, Weakness, and Wears glasses.    SOCIAL HISTORY  Social History     Tobacco Use   • Smoking status: Never Smoker   • Smokeless tobacco: Never Used   Vaping Use   • Vaping Use: Never used   Substance and Sexual Activity   • Alcohol use: No     Alcohol/week: 0.0 oz   • Drug use: Not Currently     Frequency: 7.0 times per week     Types: Marijuana   • Sexual activity: Not Currently     Birth control/protection: Implant     Social History     Substance and Sexual Activity   Drug Use Not Currently   • Frequency: 7.0 times per week   • Types: Marijuana       SURGICAL HISTORY   has a past surgical history that includes neuro dest facet l/s w/ig sngl (4/21/2015); recovery (1/27/2016); [place] by laparoscopy (8/29/2010); lumbar fusion anterior (8/21/2012); other cardiac surgery (1/2016); tonsillectomy; bowel stimulator insertion (7/13/2016); gastroscopy with balloon dilatation (N/A, 1/4/2017); muscle biopsy (Right, 1/26/2017); other abdominal surgery; laminotomy; and bowel stimulator insertion (3/10/2021).    CURRENT MEDICATIONS  Home Medications     Reviewed by Elva Zepeda R.N. (Registered Nurse) on 02/02/22 at 1800  Med List Status: Not Addressed   Medication Last Dose Status   albuterol 108 (90 Base) MCG/ACT Aero Soln inhalation aerosol  Active   diphenhydrAMINE HCl (BENADRYL PO)  Active   ipratropium-albuterol (DUONEB) 0.5-2.5 (3) MG/3ML nebulizer solution  Active   ivabradine (CORLANOR) 5 MG Tab tablet  Active   ivabradine (CORLANOR) 7.5 MG Tab tablet  Active   lactulose 20 GM/30ML Solution 2/2/2022 Active   Melatonin 10 MG Tab  Active   traMADol (ULTRAM) 50 MG Tab  Active   traZODone (DESYREL) 100 MG Tab  Active   ziprasidone (GEODON) 40 MG Cap  Active                ALLERGIES  Allergies   Allergen Reactions   • Depakote [Divalproex Sodium] Unspecified     Muscle spasms/muscle pain and weakness     •  "Doxycycline Anaphylaxis and Vomiting     Other reaction(s): pustules/blisters   • Montelukast [Singulair] Unspecified     Cardiac effusion   • Vancomycin Itching     Pt becomes flushed in face and chest.   RXN=7/10/16   • Amitriptyline Unspecified     Headaches     • Aripiprazole [Abilify] Unspecified     Headaches/muscle twitching     • Clindamycin Nausea          • Flomax [Tamsulosin Hydrochloride] Swelling   • Metformin Unspecified     Increased lactic acid     Other reaction(s): itching and rash/nausea vomiting   • Tamsulosin Swelling     Swelling of legs   • Tape Rash     Tears skin off  coban with Tegaderm tape ok intermittently  RXN=ongoing   • Wound Dressing Adhesive Hives     By pt report   • Ampicillin Rash     Pt reports that she received a rash    • Ciprofloxacin Rash         • Keflex Rash     Pt states she gets a rash with this medication  Tolerates ceftriaxone  Can take with Benadryl   • Levofloxacin Unspecified     Leg muscle cramps   • Metronidazole Rash     \"Vision problems\"   • Penicillins Hives     Can take with Benadryl   • Sulfa Drugs Itching and Myalgia     Muscle pain and weakness   • Valproic Acid Rash     .       PHYSICAL EXAM  VITAL SIGNS: /88   Pulse 77   Temp 36.8 °C (98.2 °F) (Oral)   Resp 18   Ht 1.676 m (5' 6\")   Wt 107 kg (235 lb 0.2 oz)   SpO2 98%   BMI 37.93 kg/m²   Pulse ox interpretation: I interpret this pulse ox as normal.  Constitutional: Alert in no apparent distress.  HENT: No signs of trauma, Bilateral external ears normal, Nose normal.   Eyes: Pupils are equal and reactive, Conjunctiva normal, Non-icteric.   Neck: Normal range of motion, Supple, No stridor.    Cardiovascular: Normal peripheral perfusion  Thorax & Lungs: Unlabored respirations, equal chest expansion, no accessory muscle use  Abdomen: Non-distended, no peritoneal signs.  Skin:  No erythema, No rash.   Back: Normal alignment and ROM  Extremities: No gross deformity  Musculoskeletal: Good range of " motion in all major joints.   Neurologic: Alert, Normal motor function, No focal deficits noted.   Psychiatric: Affect normal, Judgment normal, Mood normal.      COURSE & MEDICAL DECISION MAKING  Nursing notes, CARLTON EIDx reviewed in chart.     6:45 PM Patient seen and examined at bedside.  She was diagnosed by x-ray with constipation earlier today, which is consistent with her symptoms of no bowel movement for the past 2 days.  This is in the setting of failure to respond to laxatives at home, having recently been started on a narcotic pain medication.  This is meant to be a temporary medication for her, with surgery coming up to correct the underlying issue.  We discussed that she likely has narcotic induced ileus.  She will be treated with Relistor.  We also discussed that she should take Colace with each dose of tramadol.  She will be started on aggressive bowel regimen for the next few days.     The patient will return for new or worsening symptoms and is stable at the time of discharge.    The patient is referred to a primary physician for blood pressure management, diabetic screening, and for all other preventative health concerns.      DISPOSITION:  Patient will be discharged home in stable condition.    OUTPATIENT MEDICATIONS:  Discharge Medication List as of 2/2/2022  7:20 PM      START taking these medications    Details   polyethylene glycol/lytes (MIRALAX) 17 g Pack Take 1 Packet by mouth every day for 3 days., Disp-3 Each, R-0, Normal      docusate sodium (COLACE) 100 MG Cap Take 1 Capsule by mouth 2 times a day., Disp-60 Capsule, R-0, Normal               FINAL IMPRESSION  1. Drug-induced constipation

## 2022-02-08 ENCOUNTER — OFFICE VISIT (OUTPATIENT)
Dept: CARDIOLOGY | Facility: MEDICAL CENTER | Age: 32
End: 2022-02-08
Payer: MEDICARE

## 2022-02-08 ENCOUNTER — PRE-ADMISSION TESTING (OUTPATIENT)
Dept: ADMISSIONS | Facility: MEDICAL CENTER | Age: 32
End: 2022-02-08
Attending: OBSTETRICS & GYNECOLOGY
Payer: MEDICARE

## 2022-02-08 VITALS
RESPIRATION RATE: 18 BRPM | BODY MASS INDEX: 39.99 KG/M2 | HEIGHT: 65 IN | SYSTOLIC BLOOD PRESSURE: 120 MMHG | OXYGEN SATURATION: 96 % | WEIGHT: 240 LBS | DIASTOLIC BLOOD PRESSURE: 80 MMHG | HEART RATE: 95 BPM

## 2022-02-08 DIAGNOSIS — I47.11 INAPPROPRIATE SINUS TACHYCARDIA (HCC): ICD-10-CM

## 2022-02-08 DIAGNOSIS — I47.10 SVT (SUPRAVENTRICULAR TACHYCARDIA) (HCC): ICD-10-CM

## 2022-02-08 DIAGNOSIS — I48.0 PAF (PAROXYSMAL ATRIAL FIBRILLATION) (HCC): ICD-10-CM

## 2022-02-08 DIAGNOSIS — Z01.810 PREOP CARDIOVASCULAR EXAM: ICD-10-CM

## 2022-02-08 DIAGNOSIS — Z98.890 H/O PRIOR ABLATION TREATMENT: ICD-10-CM

## 2022-02-08 DIAGNOSIS — I49.3 PVC (PREMATURE VENTRICULAR CONTRACTION): ICD-10-CM

## 2022-02-08 LAB — EKG IMPRESSION: NORMAL

## 2022-02-08 PROCEDURE — 93000 ELECTROCARDIOGRAM COMPLETE: CPT | Performed by: INTERNAL MEDICINE

## 2022-02-08 PROCEDURE — 99214 OFFICE O/P EST MOD 30 MIN: CPT | Performed by: INTERNAL MEDICINE

## 2022-02-08 ASSESSMENT — ENCOUNTER SYMPTOMS
LOSS OF CONSCIOUSNESS: 0
BLURRED VISION: 0
WEIGHT LOSS: 0
PND: 0
MYALGIAS: 0
FEVER: 0
CHILLS: 0
DOUBLE VISION: 0
VOMITING: 0
PALPITATIONS: 1
HALLUCINATIONS: 0
EYE PAIN: 0
SPEECH CHANGE: 0
ORTHOPNEA: 0
DIZZINESS: 0
HEADACHES: 0
SHORTNESS OF BREATH: 0
DEPRESSION: 0
CLAUDICATION: 0
SENSORY CHANGE: 0
BLOOD IN STOOL: 0
FALLS: 0
NAUSEA: 0
EYE DISCHARGE: 0
COUGH: 0
ABDOMINAL PAIN: 0
BRUISES/BLEEDS EASILY: 0

## 2022-02-08 ASSESSMENT — FIBROSIS 4 INDEX: FIB4 SCORE: 0.52

## 2022-02-08 NOTE — PROGRESS NOTES
"Chief Complaint   Patient presents with   • Supraventricular Tachycardia (SVT)   • Chest Pain       Subjective     Kristin Balderrama is a 31 y.o. female who presents today for cardiac care and follow-up due to complex atrial arrhythmias, paroxysmal atrial fibrillation status post ablation in 2016, inappropriate sinus tachycardia on Corlanor therapy.    No structural heart disease found on transthoracic echocardiograms in the past.  I personally interpreted all the images of her transthoracic echocardiograms,  EKG tracings.    I have personally interpreted EKG today with patient, there is no evidence of acute coronary syndrome, no evidence of prior infarct, normal MO and QT interval, no significant conduction disease. Sinus rhythm.    Patient will need urgent cyst removal surgery.  She needs to have a preop evaluation before her upcoming surgery which is scheduled for next week.  She actually missed her appointment with her primary cardiologist yesterday.  That is why she is seeing me today for preop evaluation urgently.    At this time, she denies having any type of cardiac issues.  She feels fine overall.  Unchanged clinically since last visit with her primary cardiologist.    Past Medical History:   Diagnosis Date   • Abdominal pain    • Anginal syndrome     random chest pain especially with tachycardia   • Apnea, sleep    • Arrhythmia     \"sinus tachycardia\", cariologist, Dr. Kumar; ablation 2/2016   • Arthritis     osteo   • ASTHMA     when around smoke   • Atrial fibrillation (HCC)    • Back pain    • Borderline personality disorder (HCC)    • Breath shortness     with tachycardia   • Bronchitis 02/08/2022    Last time was 12/21   • Cardiac arrhythmia    • Chickenpox    • Chronic UTI 9/18/2010   • Cough    • Daytime sleepiness    • Dental disorder 03/08/2021    infected tooth   • Depression    • Diabetes (HCC)    • Diarrhea     incontinece   • Disorder of thyroid     Hashimoto's   • Fall    • Fatigue    • " "Frequent headaches    • Gasping for breath    • Gynecological disorder     PCOS   • Headache(784.0)    • Heart burn    • Heart murmur    • History of falling    • Indigestion    • Migraine    • Mitochondrial disease (HCC)    • 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HCC)    • Nausea    • Obesity    • Other fatigue 6/29/2020   • Pain 08-15-12    back, 7/10   • Painful joint    • PCOS (polycystic ovarian syndrome)    • Pneumonia 2012 12/2020   • Psychosis (HCC)    • Ringing in ears    • Scoliosis    • Shortness of breath     O2 as needed   • Sinus tachycardia 10/31/2013   • Sleep apnea     CPAP \"pulmonary doctor took me off mid year 2016\"   • Snoring    • Tonsillitis    • 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HCC)     2/8/22: Per pt: not anymore   • Tuberculosis     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   • Urinary bladder disorder     Suprapubic cath. 2/8/22: Not anymore.    • Urinary incontinence    • Weakness    • Wears glasses      Past Surgical History:   Procedure Laterality Date   • BOWEL STIMULATOR INSERTION  3/10/2021    Procedure: INSERTION, ELECTRODE LEADS AND PULSE GENERATOR, NEUROSTIMULATOR, SACRAL - REMOVAL OF INTERSTIM WITH REPLACEMENT OF SACRAL NEUROMODULATION DEVICE;  Surgeon: Joe Noyola M.D.;  Location: [facility];  Service: General   • MUSCLE BIOPSY Right 1/26/2017    Procedure: MUSCLE BIOPSY - THIGH;  Surgeon: Isidro Vigil M.D.;  Location: [facility];  Service:    • GASTROSCOPY WITH BALLOON DILATATION N/A 1/4/2017    Procedure: GASTROSCOPY WITH DILATATION;  Surgeon: Torres Vargas M.D.;  Location: [facility];  Service:    • BOWEL STIMULATOR INSERTION  7/13/2016    Procedure: BOWEL STIMULATOR INSERTION FOR PERMANENT INTERSTIM SACRAL IMPLANT;  Surgeon: Joe Noyola M.D.;  Location: [facility];  Service:    • RECOVERY  1/27/2016    Procedure: CATH LAB EP STUDY WITH SINUS NODE MODIFICATION ABHINAV;  Surgeon: Recoveryonly Surgery;  Location: SURGERY PRE-POST PROC UNIT " [facility];  Service:    • OTHER CARDIAC SURGERY  1/2016    cardiac ablation   • NEURO DEST FACET L/S W/IG SNGL  4/21/2015    Performed by Reza Tabor at [facility]   • LUMBAR FUSION ANTERIOR  8/21/2012    Performed by SUSIE SAWANT at [facility]   • ALYSSA BY LAPAROSCOPY  8/29/2010    Performed by SHAYY JOHNS at [facility]   • LAMINOTOMY     • OTHER ABDOMINAL SURGERY     • TONSILLECTOMY      tonsillectomy     Family History   Problem Relation Age of Onset   • Hypertension Mother    • Sleep Apnea Mother    • Heart Disease Mother    • Other Mother         hypothryod   • Hypertension Maternal Uncle    • Heart Disease Maternal Grandmother    • Hypertension Maternal Grandmother    • No Known Problems Sister    • Other Sister         Narcolepsy;fibromyalsia;bone;nerve   • Genitourinary () Problems Sister         endometriosis     Social History     Socioeconomic History   • Marital status: Single     Spouse name: Not on file   • Number of children: Not on file   • Years of education: Not on file   • Highest education level: Not on file   Occupational History   • Not on file   Tobacco Use   • Smoking status: Never Smoker   • Smokeless tobacco: Never Used   Vaping Use   • Vaping Use: Never used   Substance and Sexual Activity   • Alcohol use: No     Alcohol/week: 0.0 oz   • Drug use: Not Currently     Frequency: 7.0 times per week     Types: Marijuana   • Sexual activity: Not Currently     Birth control/protection: Implant   Other Topics Concern   • Not on file   Social History Narrative    ** Merged History Encounter **          Social Determinants of Health     Financial Resource Strain: Medium Risk   • Difficulty of Paying Living Expenses: Somewhat hard   Food Insecurity: Food Insecurity Present   • Worried About Running Out of Food in the Last Year: Sometimes true   • Ran Out of Food in the Last Year: Sometimes true   Transportation Needs: Unmet Transportation Needs   • Lack  of Transportation (Medical): No   • Lack of Transportation (Non-Medical): Yes   Physical Activity:    • Days of Exercise per Week: Not on file   • Minutes of Exercise per Session: Not on file   Stress:    • Feeling of Stress : Not on file   Social Connections:    • Frequency of Communication with Friends and Family: Not on file   • Frequency of Social Gatherings with Friends and Family: Not on file   • Attends Hinduism Services: Not on file   • Active Member of Clubs or Organizations: Not on file   • Attends Club or Organization Meetings: Not on file   • Marital Status: Not on file   Intimate Partner Violence:    • Fear of Current or Ex-Partner: Not on file   • Emotionally Abused: Not on file   • Physically Abused: Not on file   • Sexually Abused: Not on file   Housing Stability:    • Unable to Pay for Housing in the Last Year: Not on file   • Number of Places Lived in the Last Year: Not on file   • Unstable Housing in the Last Year: Not on file     Allergies   Allergen Reactions   • Depakote [Divalproex Sodium] Unspecified     Muscle spasms/muscle pain and weakness     • Doxycycline Anaphylaxis and Vomiting     Other reaction(s): pustules/blisters   • Montelukast [Singulair] Unspecified     Cardiac effusion   • Vancomycin Itching     Pt becomes flushed in face and chest.   RXN=7/10/16   • Amitriptyline Unspecified     Headaches     • Aripiprazole [Abilify] Unspecified     Headaches/muscle twitching     • Clindamycin Nausea          • Flomax [Tamsulosin Hydrochloride] Swelling   • Metformin Unspecified     Increased lactic acid     Other reaction(s): itching and rash/nausea vomiting   • Tamsulosin Swelling     Swelling of legs   • Tape Rash     Tears skin off  coban with Tegaderm tape ok intermittently  RXN=ongoing   • Wound Dressing Adhesive Hives     By pt report   • Ampicillin Rash     Pt reports that she received a rash    • Ciprofloxacin Rash         • Keflex Rash     Pt states she gets a rash with this  "medication  Tolerates ceftriaxone  Can take with Benadryl   • Levofloxacin Unspecified     Leg muscle cramps   • Metronidazole Rash     \"Vision problems\"   • Penicillins Hives     Can take with Benadryl   • Sulfa Drugs Itching and Myalgia     Muscle pain and weakness   • Valproic Acid Rash     .     Outpatient Encounter Medications as of 2022   Medication Sig Dispense Refill   • lactulose 20 GM/30ML Solution Take 30 mL by mouth 2 times a day. 473 mL 0   • docusate sodium (COLACE) 100 MG Cap Take 1 Capsule by mouth 2 times a day. 60 Capsule 0   • traZODone (DESYREL) 100 MG Tab Take 100 mg by mouth every evening.     • diphenhydrAMINE HCl (BENADRYL PO) Take 50 mg by mouth every day. Night time     • ivabradine (CORLANOR) 5 MG Tab tablet Take 1 Tablet by mouth 2 times a day with meals. 60 Tablet 5   • ivabradine (CORLANOR) 7.5 MG Tab tablet Take 7.5 mg by mouth 1 time a day as needed.     • ziprasidone (GEODON) 40 MG Cap TAKE 1 CAPSULE BY MOUTH TWICE A DAY. APPOINTMENT REQUIRED FOR ADDITIONAL REFILLS.  CALL [phone]. . (Patient taking differently: every day. TAKE 1 CAPSULE BY MOUTH TWICE A DAY. APPOINTMENT REQUIRED FOR ADDITIONAL REFILLS.  CALL [phone]. .) 60 Capsule 1   • traMADol (ULTRAM) 50 MG Tab Take 50 mg by mouth every four hours as needed.     • ipratropium-albuterol (DUONEB) 0.5-2.5 (3) MG/3ML nebulizer solution Take 3 mL by nebulization every four hours as needed for Shortness of Breath. Nebulizer 360 mL 2   • Melatonin 10 MG Tab Take 10 mg by mouth every bedtime.     • albuterol 108 (90 Base) MCG/ACT Aero Soln inhalation aerosol Inhale 2 Puffs every 6 hours as needed for Shortness of Breath. 8.5 g 6     No facility-administered encounter medications on file as of 2022.     Review of Systems   Constitutional: Negative for chills, fever, malaise/fatigue and weight loss.   HENT: Negative for ear discharge, ear pain, hearing loss and nosebleeds.    Eyes: Negative for " "blurred vision, double vision, pain and discharge.   Respiratory: Negative for cough and shortness of breath.    Cardiovascular: Positive for palpitations. Negative for chest pain, orthopnea, claudication, leg swelling and PND.   Gastrointestinal: Negative for abdominal pain, blood in stool, melena, nausea and vomiting.   Genitourinary: Negative for dysuria and hematuria.   Musculoskeletal: Negative for falls, joint pain and myalgias.   Skin: Negative for itching and rash.   Neurological: Negative for dizziness, sensory change, speech change, loss of consciousness and headaches.   Endo/Heme/Allergies: Negative for environmental allergies. Does not bruise/bleed easily.   Psychiatric/Behavioral: Negative for depression, hallucinations and suicidal ideas.              Objective     /80 (BP Location: Left arm, Patient Position: Sitting, BP Cuff Size: Adult)   Pulse 95   Resp 18   Ht 1.651 m (5' 5\")   Wt 109 kg (240 lb)   SpO2 96%   BMI 39.94 kg/m²     Physical Exam  Vitals and nursing note reviewed.   Constitutional:       General: She is not in acute distress.     Appearance: She is not diaphoretic.   HENT:      Head: Normocephalic and atraumatic.      Right Ear: External ear normal.      Left Ear: External ear normal.      Nose: No congestion or rhinorrhea.   Eyes:      General:         Right eye: No discharge.         Left eye: No discharge.   Neck:      Thyroid: No thyromegaly.      Vascular: No JVD.   Cardiovascular:      Rate and Rhythm: Normal rate and regular rhythm.      Pulses: Normal pulses.   Pulmonary:      Effort: No respiratory distress.   Abdominal:      General: There is no distension.      Tenderness: There is no abdominal tenderness.   Musculoskeletal:         General: No swelling or tenderness.      Right lower leg: No edema.      Left lower leg: No edema.   Skin:     General: Skin is warm and dry.   Neurological:      Mental Status: She is alert and oriented to person, place, and time. "      Cranial Nerves: No cranial nerve deficit.   Psychiatric:         Behavior: Behavior normal.                Assessment & Plan     1. SVT (supraventricular tachycardia) ([facility])  EKG    CANCELED: EKG   2. PVC (premature ventricular contraction)  EKG   3. H/O prior ablation treatment  EKG   4. Inappropriate sinus tachycardia  EKG   5. PAF (paroxysmal atrial fibrillation) ([facility])  EKG   6. Preop cardiovascular exam  EKG       Medical Decision Making: Today's Assessment/Status/Plan:    Patient is not in acute coronary syndrome presentation, is not having any active TIA or stroke symptoms, as not having decompensated heart failure, is not symptomatic in terms of presyncope pain or syncope. No evidence of significant valvular disease.    I explained to patient that further cardiac testing or procedures will not lower her overall cardiovascular risk for the surgery.  Patient remains low to moderate cardiovascular risk for her intended surgery.

## 2022-02-08 NOTE — LETTER
February 8, 2022        Kristin Balderrama was seen in my cardiology clinic today.  Patient will have gyn surgery soon.   Patient is not in acute coronary syndrome presentation, is not having any active TIA or stroke symptoms, as not having decompensated heart failure, is not symptomatic in terms of presyncope pain or syncope. No evidence of significant valvular disease.    I explained to patient that further cardiac testing or procedures will not lower his overall cardiovascular risk for the surgery.  Patient remains low to moderate cardiovascular risk for her intended surgery.        Crystal Walsh MD.   [facility].  [phone]  [place], [place].

## 2022-02-10 ENCOUNTER — PRE-ADMISSION TESTING (OUTPATIENT)
Dept: ADMISSIONS | Facility: MEDICAL CENTER | Age: 32
End: 2022-02-10
Attending: OBSTETRICS & GYNECOLOGY
Payer: MEDICARE

## 2022-02-10 DIAGNOSIS — Z01.810 PRE-OPERATIVE CARDIOVASCULAR EXAMINATION: ICD-10-CM

## 2022-02-10 DIAGNOSIS — Z01.812 PRE-OPERATIVE LABORATORY EXAMINATION: ICD-10-CM

## 2022-02-10 LAB
ABO GROUP BLD: NORMAL
ALBUMIN SERPL BCP-MCNC: 4.7 G/DL (ref 3.2–4.9)
ALBUMIN/GLOB SERPL: 2.5 G/DL
ALP SERPL-CCNC: 105 U/L (ref 30–99)
ALT SERPL-CCNC: 20 U/L (ref 2–50)
ANION GAP SERPL CALC-SCNC: 13 MMOL/L (ref 7–16)
APPEARANCE UR: CLEAR
AST SERPL-CCNC: 9 U/L (ref 12–45)
B-HCG SERPL-ACNC: <1 MIU/ML (ref 0–5)
BACTERIA #/AREA URNS HPF: NEGATIVE /HPF
BASOPHILS # BLD AUTO: 0.6 % (ref 0–1.8)
BASOPHILS # BLD: 0.03 K/UL (ref 0–0.12)
BILIRUB SERPL-MCNC: 0.4 MG/DL (ref 0.1–1.5)
BILIRUB UR QL STRIP.AUTO: NEGATIVE
BLD GP AB SCN SERPL QL: NORMAL
BUN SERPL-MCNC: 8 MG/DL (ref 8–22)
CALCIUM SERPL-MCNC: 9.4 MG/DL (ref 8.5–10.5)
CHLORIDE SERPL-SCNC: 107 MMOL/L (ref 96–112)
CO2 SERPL-SCNC: 19 MMOL/L (ref 20–33)
COLOR UR: YELLOW
CREAT SERPL-MCNC: 0.65 MG/DL (ref 0.5–1.4)
EKG IMPRESSION: NORMAL
EOSINOPHIL # BLD AUTO: 0.1 K/UL (ref 0–0.51)
EOSINOPHIL NFR BLD: 1.9 % (ref 0–6.9)
EPI CELLS #/AREA URNS HPF: ABNORMAL /HPF
ERYTHROCYTE [DISTWIDTH] IN BLOOD BY AUTOMATED COUNT: 41.1 FL (ref 35.9–50)
EST. AVERAGE GLUCOSE BLD GHB EST-MCNC: 111 MG/DL
GLOBULIN SER CALC-MCNC: 1.9 G/DL (ref 1.9–3.5)
GLUCOSE SERPL-MCNC: 143 MG/DL (ref 65–99)
GLUCOSE UR STRIP.AUTO-MCNC: NEGATIVE MG/DL
HBA1C MFR BLD: 5.5 % (ref 4–5.6)
HCT VFR BLD AUTO: 41.3 % (ref 37–47)
HGB BLD-MCNC: 14 G/DL (ref 12–16)
HYALINE CASTS #/AREA URNS LPF: ABNORMAL /LPF
IMM GRANULOCYTES # BLD AUTO: 0.02 K/UL (ref 0–0.11)
IMM GRANULOCYTES NFR BLD AUTO: 0.4 % (ref 0–0.9)
KETONES UR STRIP.AUTO-MCNC: NEGATIVE MG/DL
LEUKOCYTE ESTERASE UR QL STRIP.AUTO: ABNORMAL
LYMPHOCYTES # BLD AUTO: 1.33 K/UL (ref 1–4.8)
LYMPHOCYTES NFR BLD: 25.4 % (ref 22–41)
MCH RBC QN AUTO: 30.1 PG (ref 27–33)
MCHC RBC AUTO-ENTMCNC: 33.9 G/DL (ref 33.6–35)
MCV RBC AUTO: 88.8 FL (ref 81.4–97.8)
MICRO URNS: ABNORMAL
MONOCYTES # BLD AUTO: 0.29 K/UL (ref 0–0.85)
MONOCYTES NFR BLD AUTO: 5.5 % (ref 0–13.4)
NEUTROPHILS # BLD AUTO: 3.46 K/UL (ref 2–7.15)
NEUTROPHILS NFR BLD: 66.2 % (ref 44–72)
NITRITE UR QL STRIP.AUTO: NEGATIVE
NRBC # BLD AUTO: 0 K/UL
NRBC BLD-RTO: 0 /100 WBC
PH UR STRIP.AUTO: 6 [PH] (ref 5–8)
PLATELET # BLD AUTO: 185 K/UL (ref 164–446)
PMV BLD AUTO: 12.1 FL (ref 9–12.9)
POTASSIUM SERPL-SCNC: 4 MMOL/L (ref 3.6–5.5)
PROT SERPL-MCNC: 6.6 G/DL (ref 6–8.2)
PROT UR QL STRIP: NEGATIVE MG/DL
RBC # BLD AUTO: 4.65 M/UL (ref 4.2–5.4)
RBC # URNS HPF: ABNORMAL /HPF
RBC UR QL AUTO: NEGATIVE
RH BLD: NORMAL
SARS-COV-2 RNA RESP QL NAA+PROBE: NOTDETECTED
SODIUM SERPL-SCNC: 139 MMOL/L (ref 135–145)
SP GR UR STRIP.AUTO: 1.02
SPECIMEN SOURCE: NORMAL
UROBILINOGEN UR STRIP.AUTO-MCNC: 0.2 MG/DL
WBC # BLD AUTO: 5.2 K/UL (ref 4.8–10.8)
WBC #/AREA URNS HPF: ABNORMAL /HPF
YEAST BUDDING URNS QL: PRESENT /HPF

## 2022-02-10 PROCEDURE — 86850 RBC ANTIBODY SCREEN: CPT

## 2022-02-10 PROCEDURE — 86901 BLOOD TYPING SEROLOGIC RH(D): CPT

## 2022-02-10 PROCEDURE — 93010 ELECTROCARDIOGRAM REPORT: CPT | Performed by: INTERNAL MEDICINE

## 2022-02-10 PROCEDURE — 84702 CHORIONIC GONADOTROPIN TEST: CPT

## 2022-02-10 PROCEDURE — 81001 URINALYSIS AUTO W/SCOPE: CPT

## 2022-02-10 PROCEDURE — U0005 INFEC AGEN DETEC AMPLI PROBE: HCPCS

## 2022-02-10 PROCEDURE — 86900 BLOOD TYPING SEROLOGIC ABO: CPT

## 2022-02-10 PROCEDURE — 36415 COLL VENOUS BLD VENIPUNCTURE: CPT

## 2022-02-10 PROCEDURE — 80053 COMPREHEN METABOLIC PANEL: CPT

## 2022-02-10 PROCEDURE — 85025 COMPLETE CBC W/AUTO DIFF WBC: CPT

## 2022-02-10 PROCEDURE — 83036 HEMOGLOBIN GLYCOSYLATED A1C: CPT

## 2022-02-10 PROCEDURE — C9803 HOPD COVID-19 SPEC COLLECT: HCPCS

## 2022-02-10 PROCEDURE — 93005 ELECTROCARDIOGRAM TRACING: CPT

## 2022-02-10 PROCEDURE — U0003 INFECTIOUS AGENT DETECTION BY NUCLEIC ACID (DNA OR RNA); SEVERE ACUTE RESPIRATORY SYNDROME CORONAVIRUS 2 (SARS-COV-2) (CORONAVIRUS DISEASE [COVID-19]), AMPLIFIED PROBE TECHNIQUE, MAKING USE OF HIGH THROUGHPUT TECHNOLOGIES AS DESCRIBED BY CMS-2020-01-R: HCPCS

## 2022-02-10 NOTE — OR NURSING
Pt came in today to do blood work , ekg , covid and a urine test. The  gave pt  the container for a urine sample and left without doing it. I left a message for the pt to return and give the urine sample.

## 2022-02-14 ENCOUNTER — ANESTHESIA EVENT (OUTPATIENT)
Dept: SURGERY | Facility: MEDICAL CENTER | Age: 32
End: 2022-02-14
Payer: MEDICARE

## 2022-02-14 ENCOUNTER — HOSPITAL ENCOUNTER (OUTPATIENT)
Facility: MEDICAL CENTER | Age: 32
End: 2022-02-14
Attending: OBSTETRICS & GYNECOLOGY | Admitting: OBSTETRICS & GYNECOLOGY
Payer: MEDICARE

## 2022-02-14 ENCOUNTER — ANESTHESIA (OUTPATIENT)
Dept: SURGERY | Facility: MEDICAL CENTER | Age: 32
End: 2022-02-14
Payer: MEDICARE

## 2022-02-14 VITALS
SYSTOLIC BLOOD PRESSURE: 134 MMHG | RESPIRATION RATE: 16 BRPM | HEIGHT: 65 IN | TEMPERATURE: 97.5 F | WEIGHT: 235.67 LBS | OXYGEN SATURATION: 92 % | DIASTOLIC BLOOD PRESSURE: 82 MMHG | HEART RATE: 78 BPM | BODY MASS INDEX: 39.27 KG/M2

## 2022-02-14 DIAGNOSIS — G89.18 PAIN FOLLOWING SURGERY OR PROCEDURE: ICD-10-CM

## 2022-02-14 LAB
HCG UR QL: NEGATIVE
PATHOLOGY CONSULT NOTE: NORMAL

## 2022-02-14 PROCEDURE — 160009 HCHG ANES TIME/MIN: Performed by: OBSTETRICS & GYNECOLOGY

## 2022-02-14 PROCEDURE — 160028 HCHG SURGERY MINUTES - 1ST 30 MINS LEVEL 3: Performed by: OBSTETRICS & GYNECOLOGY

## 2022-02-14 PROCEDURE — 700111 HCHG RX REV CODE 636 W/ 250 OVERRIDE (IP): Performed by: ANESTHESIOLOGY

## 2022-02-14 PROCEDURE — 501577 HCHG TROCAR, STEP 11MM: Performed by: OBSTETRICS & GYNECOLOGY

## 2022-02-14 PROCEDURE — 500002 HCHG ADHESIVE, DERMABOND: Performed by: OBSTETRICS & GYNECOLOGY

## 2022-02-14 PROCEDURE — 160035 HCHG PACU - 1ST 60 MINS PHASE I: Performed by: OBSTETRICS & GYNECOLOGY

## 2022-02-14 PROCEDURE — 501399 HCHG SPECIMAN BAG, ENDO CATC: Performed by: OBSTETRICS & GYNECOLOGY

## 2022-02-14 PROCEDURE — 160036 HCHG PACU - EA ADDL 30 MINS PHASE I: Performed by: OBSTETRICS & GYNECOLOGY

## 2022-02-14 PROCEDURE — 160046 HCHG PACU - 1ST 60 MINS PHASE II: Performed by: OBSTETRICS & GYNECOLOGY

## 2022-02-14 PROCEDURE — 700102 HCHG RX REV CODE 250 W/ 637 OVERRIDE(OP): Performed by: ANESTHESIOLOGY

## 2022-02-14 PROCEDURE — A9270 NON-COVERED ITEM OR SERVICE: HCPCS | Performed by: ANESTHESIOLOGY

## 2022-02-14 PROCEDURE — 500886 HCHG PACK, LAPAROSCOPY: Performed by: OBSTETRICS & GYNECOLOGY

## 2022-02-14 PROCEDURE — 160047 HCHG PACU  - EA ADDL 30 MINS PHASE II: Performed by: OBSTETRICS & GYNECOLOGY

## 2022-02-14 PROCEDURE — 160039 HCHG SURGERY MINUTES - EA ADDL 1 MIN LEVEL 3: Performed by: OBSTETRICS & GYNECOLOGY

## 2022-02-14 PROCEDURE — 160002 HCHG RECOVERY MINUTES (STAT): Performed by: OBSTETRICS & GYNECOLOGY

## 2022-02-14 PROCEDURE — 88304 TISSUE EXAM BY PATHOLOGIST: CPT

## 2022-02-14 PROCEDURE — 160025 RECOVERY II MINUTES (STATS): Performed by: OBSTETRICS & GYNECOLOGY

## 2022-02-14 PROCEDURE — 501838 HCHG SUTURE GENERAL: Performed by: OBSTETRICS & GYNECOLOGY

## 2022-02-14 PROCEDURE — 501570 HCHG TROCAR, SEPARATOR: Performed by: OBSTETRICS & GYNECOLOGY

## 2022-02-14 PROCEDURE — 81025 URINE PREGNANCY TEST: CPT

## 2022-02-14 PROCEDURE — 700105 HCHG RX REV CODE 258: Performed by: OBSTETRICS & GYNECOLOGY

## 2022-02-14 PROCEDURE — 700101 HCHG RX REV CODE 250: Performed by: ANESTHESIOLOGY

## 2022-02-14 PROCEDURE — 160048 HCHG OR STATISTICAL LEVEL 1-5: Performed by: OBSTETRICS & GYNECOLOGY

## 2022-02-14 RX ORDER — METOPROLOL TARTRATE 1 MG/ML
1 INJECTION, SOLUTION INTRAVENOUS
Status: DISCONTINUED | OUTPATIENT
Start: 2022-02-14 | End: 2022-02-14 | Stop reason: HOSPADM

## 2022-02-14 RX ORDER — DIPHENHYDRAMINE HYDROCHLORIDE 50 MG/ML
12.5 INJECTION INTRAMUSCULAR; INTRAVENOUS
Status: DISCONTINUED | OUTPATIENT
Start: 2022-02-14 | End: 2022-02-14 | Stop reason: HOSPADM

## 2022-02-14 RX ORDER — HYDROMORPHONE HYDROCHLORIDE 1 MG/ML
0.4 INJECTION, SOLUTION INTRAMUSCULAR; INTRAVENOUS; SUBCUTANEOUS
Status: DISCONTINUED | OUTPATIENT
Start: 2022-02-14 | End: 2022-02-14 | Stop reason: HOSPADM

## 2022-02-14 RX ORDER — MIDAZOLAM HYDROCHLORIDE 1 MG/ML
1 INJECTION INTRAMUSCULAR; INTRAVENOUS
Status: DISCONTINUED | OUTPATIENT
Start: 2022-02-14 | End: 2022-02-14 | Stop reason: HOSPADM

## 2022-02-14 RX ORDER — HALOPERIDOL 5 MG/ML
1 INJECTION INTRAMUSCULAR
Status: DISCONTINUED | OUTPATIENT
Start: 2022-02-14 | End: 2022-02-14 | Stop reason: HOSPADM

## 2022-02-14 RX ORDER — LABETALOL HYDROCHLORIDE 5 MG/ML
5 INJECTION, SOLUTION INTRAVENOUS
Status: DISCONTINUED | OUTPATIENT
Start: 2022-02-14 | End: 2022-02-14 | Stop reason: HOSPADM

## 2022-02-14 RX ORDER — LIDOCAINE HYDROCHLORIDE 20 MG/ML
INJECTION, SOLUTION EPIDURAL; INFILTRATION; INTRACAUDAL; PERINEURAL PRN
Status: DISCONTINUED | OUTPATIENT
Start: 2022-02-14 | End: 2022-02-14 | Stop reason: SURG

## 2022-02-14 RX ORDER — MEPERIDINE HYDROCHLORIDE 25 MG/ML
12.5 INJECTION INTRAMUSCULAR; INTRAVENOUS; SUBCUTANEOUS
Status: DISCONTINUED | OUTPATIENT
Start: 2022-02-14 | End: 2022-02-14 | Stop reason: HOSPADM

## 2022-02-14 RX ORDER — EPINEPHRINE 1 MG/ML(1)
AMPUL (ML) INJECTION
Status: DISCONTINUED
Start: 2022-02-14 | End: 2022-02-14 | Stop reason: HOSPADM

## 2022-02-14 RX ORDER — GLYCOPYRROLATE 0.2 MG/ML
INJECTION INTRAMUSCULAR; INTRAVENOUS PRN
Status: DISCONTINUED | OUTPATIENT
Start: 2022-02-14 | End: 2022-02-14 | Stop reason: SURG

## 2022-02-14 RX ORDER — OXYCODONE HCL 5 MG/5 ML
10 SOLUTION, ORAL ORAL
Status: COMPLETED | OUTPATIENT
Start: 2022-02-14 | End: 2022-02-14

## 2022-02-14 RX ORDER — IPRATROPIUM BROMIDE AND ALBUTEROL SULFATE 2.5; .5 MG/3ML; MG/3ML
3 SOLUTION RESPIRATORY (INHALATION)
Status: DISCONTINUED | OUTPATIENT
Start: 2022-02-14 | End: 2022-02-14 | Stop reason: HOSPADM

## 2022-02-14 RX ORDER — IBUPROFEN 600 MG/1
600 TABLET ORAL EVERY 6 HOURS PRN
Qty: 30 TABLET | Refills: 1 | Status: ON HOLD
Start: 2022-02-14 | End: 2022-07-28

## 2022-02-14 RX ORDER — ROCURONIUM BROMIDE 10 MG/ML
INJECTION, SOLUTION INTRAVENOUS PRN
Status: DISCONTINUED | OUTPATIENT
Start: 2022-02-14 | End: 2022-02-14 | Stop reason: SURG

## 2022-02-14 RX ORDER — NEOSTIGMINE METHYLSULFATE 1 MG/ML
INJECTION, SOLUTION INTRAVENOUS PRN
Status: DISCONTINUED | OUTPATIENT
Start: 2022-02-14 | End: 2022-02-14 | Stop reason: SURG

## 2022-02-14 RX ORDER — ONDANSETRON 2 MG/ML
INJECTION INTRAMUSCULAR; INTRAVENOUS PRN
Status: DISCONTINUED | OUTPATIENT
Start: 2022-02-14 | End: 2022-02-14 | Stop reason: SURG

## 2022-02-14 RX ORDER — OXYCODONE HYDROCHLORIDE AND ACETAMINOPHEN 5; 325 MG/1; MG/1
1 TABLET ORAL EVERY 4 HOURS PRN
Qty: 15 TABLET | Refills: 0 | Status: SHIPPED | OUTPATIENT
Start: 2022-02-14 | End: 2022-02-18

## 2022-02-14 RX ORDER — SODIUM CHLORIDE, SODIUM LACTATE, POTASSIUM CHLORIDE, CALCIUM CHLORIDE 600; 310; 30; 20 MG/100ML; MG/100ML; MG/100ML; MG/100ML
INJECTION, SOLUTION INTRAVENOUS CONTINUOUS
Status: DISCONTINUED | OUTPATIENT
Start: 2022-02-14 | End: 2022-02-14 | Stop reason: HOSPADM

## 2022-02-14 RX ORDER — HYDROMORPHONE HYDROCHLORIDE 1 MG/ML
0.1 INJECTION, SOLUTION INTRAMUSCULAR; INTRAVENOUS; SUBCUTANEOUS
Status: DISCONTINUED | OUTPATIENT
Start: 2022-02-14 | End: 2022-02-14 | Stop reason: HOSPADM

## 2022-02-14 RX ORDER — OXYCODONE HCL 5 MG/5 ML
5 SOLUTION, ORAL ORAL
Status: COMPLETED | OUTPATIENT
Start: 2022-02-14 | End: 2022-02-14

## 2022-02-14 RX ORDER — BUPIVACAINE HYDROCHLORIDE 2.5 MG/ML
INJECTION, SOLUTION EPIDURAL; INFILTRATION; INTRACAUDAL
Status: DISCONTINUED
Start: 2022-02-14 | End: 2022-02-14 | Stop reason: HOSPADM

## 2022-02-14 RX ORDER — HYDROMORPHONE HYDROCHLORIDE 1 MG/ML
0.2 INJECTION, SOLUTION INTRAMUSCULAR; INTRAVENOUS; SUBCUTANEOUS
Status: DISCONTINUED | OUTPATIENT
Start: 2022-02-14 | End: 2022-02-14 | Stop reason: HOSPADM

## 2022-02-14 RX ORDER — SODIUM CHLORIDE, SODIUM LACTATE, POTASSIUM CHLORIDE, CALCIUM CHLORIDE 600; 310; 30; 20 MG/100ML; MG/100ML; MG/100ML; MG/100ML
INJECTION, SOLUTION INTRAVENOUS CONTINUOUS
Status: ACTIVE | OUTPATIENT
Start: 2022-02-14 | End: 2022-02-14

## 2022-02-14 RX ORDER — HYDRALAZINE HYDROCHLORIDE 20 MG/ML
5 INJECTION INTRAMUSCULAR; INTRAVENOUS
Status: DISCONTINUED | OUTPATIENT
Start: 2022-02-14 | End: 2022-02-14 | Stop reason: HOSPADM

## 2022-02-14 RX ORDER — DEXAMETHASONE SODIUM PHOSPHATE 4 MG/ML
INJECTION, SOLUTION INTRA-ARTICULAR; INTRALESIONAL; INTRAMUSCULAR; INTRAVENOUS; SOFT TISSUE PRN
Status: DISCONTINUED | OUTPATIENT
Start: 2022-02-14 | End: 2022-02-14 | Stop reason: SURG

## 2022-02-14 RX ORDER — KETOROLAC TROMETHAMINE 30 MG/ML
INJECTION, SOLUTION INTRAMUSCULAR; INTRAVENOUS PRN
Status: DISCONTINUED | OUTPATIENT
Start: 2022-02-14 | End: 2022-02-14 | Stop reason: SURG

## 2022-02-14 RX ORDER — ONDANSETRON 2 MG/ML
4 INJECTION INTRAMUSCULAR; INTRAVENOUS
Status: DISCONTINUED | OUTPATIENT
Start: 2022-02-14 | End: 2022-02-14 | Stop reason: HOSPADM

## 2022-02-14 RX ADMIN — PROPOFOL 200 MG: 10 INJECTION, EMULSION INTRAVENOUS at 07:55

## 2022-02-14 RX ADMIN — NEOSTIGMINE METHYLSULFATE 3 MG: 1 INJECTION INTRAVENOUS at 08:52

## 2022-02-14 RX ADMIN — SODIUM CHLORIDE, POTASSIUM CHLORIDE, SODIUM LACTATE AND CALCIUM CHLORIDE: 600; 310; 30; 20 INJECTION, SOLUTION INTRAVENOUS at 07:52

## 2022-02-14 RX ADMIN — FENTANYL CITRATE 50 MCG: 50 INJECTION, SOLUTION INTRAMUSCULAR; INTRAVENOUS at 09:12

## 2022-02-14 RX ADMIN — SODIUM CHLORIDE, POTASSIUM CHLORIDE, SODIUM LACTATE AND CALCIUM CHLORIDE: 600; 310; 30; 20 INJECTION, SOLUTION INTRAVENOUS at 08:37

## 2022-02-14 RX ADMIN — DEXAMETHASONE SODIUM PHOSPHATE 8 MG: 4 INJECTION, SOLUTION INTRA-ARTICULAR; INTRALESIONAL; INTRAMUSCULAR; INTRAVENOUS; SOFT TISSUE at 08:15

## 2022-02-14 RX ADMIN — FENTANYL CITRATE 100 MCG: 50 INJECTION, SOLUTION INTRAMUSCULAR; INTRAVENOUS at 08:15

## 2022-02-14 RX ADMIN — GLYCOPYRROLATE 0.4 MG: 0.2 INJECTION INTRAMUSCULAR; INTRAVENOUS at 08:52

## 2022-02-14 RX ADMIN — ROCURONIUM BROMIDE 50 MG: 10 INJECTION, SOLUTION INTRAVENOUS at 07:55

## 2022-02-14 RX ADMIN — ONDANSETRON 4 MG: 2 INJECTION INTRAMUSCULAR; INTRAVENOUS at 08:24

## 2022-02-14 RX ADMIN — FENTANYL CITRATE 50 MCG: 50 INJECTION, SOLUTION INTRAMUSCULAR; INTRAVENOUS at 08:27

## 2022-02-14 RX ADMIN — HYDROMORPHONE HYDROCHLORIDE 0.4 MG: 1 INJECTION, SOLUTION INTRAMUSCULAR; INTRAVENOUS; SUBCUTANEOUS at 10:09

## 2022-02-14 RX ADMIN — LIDOCAINE HYDROCHLORIDE 100 MG: 20 INJECTION, SOLUTION EPIDURAL; INFILTRATION; INTRACAUDAL at 07:55

## 2022-02-14 RX ADMIN — OXYCODONE HYDROCHLORIDE 10 MG: 5 SOLUTION ORAL at 09:06

## 2022-02-14 RX ADMIN — FENTANYL CITRATE 50 MCG: 50 INJECTION, SOLUTION INTRAMUSCULAR; INTRAVENOUS at 09:20

## 2022-02-14 RX ADMIN — FENTANYL CITRATE 100 MCG: 50 INJECTION, SOLUTION INTRAMUSCULAR; INTRAVENOUS at 07:59

## 2022-02-14 RX ADMIN — KETOROLAC TROMETHAMINE 30 MG: 30 INJECTION, SOLUTION INTRAMUSCULAR at 08:37

## 2022-02-14 RX ADMIN — HYDROMORPHONE HYDROCHLORIDE 0.4 MG: 1 INJECTION, SOLUTION INTRAMUSCULAR; INTRAVENOUS; SUBCUTANEOUS at 10:30

## 2022-02-14 ASSESSMENT — PAIN DESCRIPTION - PAIN TYPE
TYPE: SURGICAL PAIN

## 2022-02-14 ASSESSMENT — FIBROSIS 4 INDEX
FIB4 SCORE: 0.35
FIB4 SCORE: 0.35

## 2022-02-14 NOTE — OR NURSING
0871 received patient from OR. Report from Anesthesiologist and OR RN. Patient on 4L at 97 % O2. VSS. Monitor connected. S/P tubal cyst excision R side, incisions x3 covered with gauze and tegaderm CDI, peripad dry. Stating 9/10 pain, medicated per MAR    0920 Subsequent dose of fentanyl given for 8/10 pain    0925 Handoff to Floridalma HASSAN

## 2022-02-14 NOTE — ANESTHESIA PROCEDURE NOTES
Airway    Date/Time: 2/14/2022 7:56 AM  Performed by: Bright Chavez M.D.  Authorized by: Bright Chavez M.D.     Location:  OR  Urgency:  Elective  Difficult Airway: No    Indications for Airway Management:  Anesthesia      Spontaneous Ventilation: absent    Sedation Level:  Deep  Preoxygenated: Yes    Patient Position:  Sniffing  Mask Difficulty Assessment:  1 - vent by mask  Final Airway Type:  Endotracheal airway  Final Endotracheal Airway:  ETT  Cuffed: Yes    Technique Used for Successful ETT Placement:  Direct laryngoscopy    Insertion Site:  Oral  Blade Type:  Acuña  Laryngoscope Blade/Videolaryngoscope Blade Size:  2  ETT Size (mm):  7.0  Measured from:  Teeth  ETT to Teeth (cm):  22  Placement Verified by: auscultation and capnometry    Cormack-Lehane Classification:  Grade I - full view of glottis  Number of Attempts at Approach:  1

## 2022-02-14 NOTE — DISCHARGE INSTRUCTIONS
ACTIVITY: Rest and take it easy for the first 24 hours.  A responsible adult is recommended to remain with you during that time.  It is normal to feel sleepy.  We encourage you to not do anything that requires balance, judgment or coordination.    MILD FLU-LIKE SYMPTOMS ARE NORMAL. YOU MAY EXPERIENCE GENERALIZED MUSCLE ACHES, THROAT IRRITATION, HEADACHE AND/OR SOME NAUSEA.    FOR 24 HOURS DO NOT:  Drive, operate machinery or run household appliances.  Drink beer or alcoholic beverages.   Make important decisions or sign legal documents.    SPECIAL INSTRUCTIONS: No lifting, exercise, intercourse/tampons/douching for 2 weeks. No car driving for 2 weeks. No tub baths for 4 weeks. Monitor for excessive bleeding, if saturating a pad in one hour with blood, call the office.     DIET: To avoid nausea, slowly advance diet as tolerated, avoiding spicy or greasy foods for the first day.  Add more substantial food to your diet according to your physician's instructions.  Babies can be fed formula or breast milk as soon as they are hungry.  INCREASE FLUIDS AND FIBER TO AVOID CONSTIPATION.    SURGICAL DRESSING/BATHING: Can shower tomorrow, remove dressings in 3 days, if strips are under gauze, they will fall off on their own    FOLLOW-UP APPOINTMENT:  A follow-up appointment should be arranged with your doctor; call to schedule.    You should CALL YOUR PHYSICIAN if you develop:  Fever greater than 101 degrees F.  Pain not relieved by medication, or persistent nausea or vomiting.  Excessive bleeding (blood soaking through dressing) or unexpected drainage from the wound.  Extreme redness or swelling around the incision site, drainage of pus or foul smelling drainage.  Inability to urinate or empty your bladder within 8 hours.  Problems with breathing or chest pain.    You should call 911 if you develop problems with breathing or chest pain.  If you are unable to contact your doctor or surgical center, you should go to the  nearest emergency room or urgent care center.  Physician's telephone #: [phone]    If any questions arise, call your doctor.  If your doctor is not available, please feel free to call the Surgical Center at [phone].     A registered nurse may call you a few days after your surgery to see how you are doing after your procedure.    MEDICATIONS: Resume taking daily medication.  Take prescribed pain medication with food.  If no medication is prescribed, you may take non-aspirin pain medication if needed.  PAIN MEDICATION CAN BE VERY CONSTIPATING.  Take a stool softener or laxative such as senokot, pericolace, or milk of magnesia if needed.    Prescription given for Percocet and Ibuprofen.  Last pain medication given at Oxycodone at 9:00 am, start Percocet at 1pm if needed. May start Ibuprofen at 2:30 pm.     If your physician has prescribed pain medication that includes Acetaminophen (Tylenol), do not take additional Acetaminophen (Tylenol) while taking the prescribed medication.    Depression / Suicide Risk    As you are discharged from this [facility] facility, it is important to learn how to keep safe from harming yourself.    Recognize the warning signs:  · Abrupt changes in personality, positive or negative- including increase in energy   · Giving away possessions  · Change in eating patterns- significant weight changes-  positive or negative  · Change in sleeping patterns- unable to sleep or sleeping all the time   · Unwillingness or inability to communicate  · Depression  · Unusual sadness, discouragement and loneliness  · Talk of wanting to die  · Neglect of personal appearance   · Rebelliousness- reckless behavior  · Withdrawal from people/activities they love  · Confusion- inability to concentrate     If you or a loved one observes any of these behaviors or has concerns about self-harm, here's what you can do:  · Talk about it- your feelings and reasons for harming yourself  · Remove any means  that you might use to hurt yourself (examples: pills, rope, extension cords, firearm)  · Get professional help from the community (Mental Health, Substance Abuse, psychological counseling)  · Do not be alone:Call your Safe Contact- someone whom you trust who will be there for you.  · Call your local CRISIS HOTLINE 299-6755 or 848-240-0797  · Call your local Children's Mobile Crisis Response Team [place] (812) 293-3627 or www.[employer]  · Call the toll free National Suicide Prevention Hotlines   · National Suicide Prevention Lifeline 158-998-QKZJ (8078)  · National Hope Line Network 800-SUICIDE (804-3451)

## 2022-02-14 NOTE — ANESTHESIA POSTPROCEDURE EVALUATION
Patient: Kristin Balderrama    Procedure Summary     Date: 02/14/22 Room / Location: [facility] ROOM 21 / SURGERY SAME DAY [facility]    Anesthesia Start: 0752 Anesthesia Stop: 0902    Procedures:       LAPAROSCOPY (Abdomen)      EXCISION, CYST, OVARY (Right Abdomen) Diagnosis: (Peritubal Cyst )    Surgeons: Seamus Pisano M.D. Responsible Provider: Bright Chavez M.D.    Anesthesia Type: general ASA Status: 3          Final Anesthesia Type: general  Last vitals  BP   Blood Pressure: (!) 171/84    Temp   36.2 °C (97.2 °F)    Pulse   77   Resp   18    SpO2   97 %      Anesthesia Post Evaluation    Patient location during evaluation: PACU  Patient participation: complete - patient participated  Level of consciousness: awake and alert    Airway patency: patent  Anesthetic complications: no  Cardiovascular status: hemodynamically stable  Respiratory status: acceptable  Hydration status: euvolemic    PONV: none          No complications documented.     Nurse Pain Score: 0 (NPRS)

## 2022-02-14 NOTE — OR NURSING
0967 Report from SONYA Morgan. Pt sitting up, tolerating PO, states pain is 5/10, declines pharm intervention at this time.     1000 Pain tolerable, meets phase II. Occasionally drops sats to 87%, pt wears prn home O2, instructed to use today d/t drowsiness with anesthesia.     1009 Medicated for increasing 8/10 pain.    1030 Medicated for 8/10 pain.     1038 To bathroom by ivan, able to void w/o difficulty. Called ride for .    1105 Discharge orders received. Meets discharge criteria at this time. Tolerable 6/10 pain. No nausea. Tolerating PO. On RA. All lines and monitors discontinued. Reviewed discharge paperwork with pt and grandmother. Discussed diet, activity, medications, follow up care and worsening symptoms. No questions at this time. Pt to be discharged to home via private vehicle. Pt escorted out of department in wheelchair with RN and all belongings.

## 2022-02-14 NOTE — OP REPORT
Gynecologic Surgical Operative Note     Patient: Kristin Balderrama      Date: 2022     Surgeon: Seamus Pisano MD, MS, FACOG    Assistant: none    Pre-op diagnosis:   1. 31 y.o. .  2. Pelvic pain.  3. RIGHT simple cystic adnexal mass, thought to be ovarian on recent CT studies.  Persistent over multiple years time.    Post-op diagnosis:  1. 31 y.o. .  2. Pelvic pain.  3. RIGHT simple cystic adnexal mass, thought to be ovarian on recent CT studies.  Persistent over multiple years time.  4. Mass found to be RIGHT paratubal cyst    Procedure: Diagnostic and operative laparoscopy, excision of paratubal cyst    Anesthesia: general via ET tube, local    Findings: Normal appearing vulva, vagina and cervix, mild bleeding though cervix.  Normal-appearing uterus and bilateral ovaries.  Paratubal cyst enveloped by the misosalpinx.      EBL: 15mL in pelvis at beginning of case, minimal (<5mL) intraoperative bleeding    UOP: 75mL, clear, straw-colored    Fluids: 1800mL of crystaloid    Specimens/Cultures: Paratubal cyst    Drains/Foreign Objects Retained: none    Complications: none    Condition: good    Disposition: PACU then home    Narrative:  Laparoscopic paratubal cystectomy  Having previously been advised on the risks, benefits, and alternatives to diagnostic laparoscopy and surgical treatment of ectopic pregnancy with salpingectomy versus salpingostomy, and signed an operative consent form in preop confirming her desire to proceed with the procedure. The patient was then taken to the operating room with an IV in place. She was administered general anesthesia by ET tube and then placed into the dorsal lithotomy position. She was prepped and draped in the usual sterile fashion for a gynecologic laparoscopic procedure. During the prep, the patient had a Andrade catheter placed in her bladder and sequential compression devices placed on her legs for thrombotic prevention. At this point, a timeout was  taken. The patient and the procedure were positively identified.     Attention was turned to the vaginal portion of the procedure where a bivalve speculum was placed in the vagina. The anterior lip of the cervix was visualized and grasped with a single-tooth tenaculum. A Hulka tenaculum was then placed through the cervix into the uterus and secured onto the anterior lip of the cervix. The single-tooth tenaculum was then removed from the anterior lip of the cervix. The bivalve speculum was removed from the patient's vagina.     The surgical team then donned newly sterile scrub wear and attended to the abdominal portion of the procedure. 0.25% bupivacaine with epinephrine was infiltrated into the infraumbilical area. A vertical skin incision was made sharply with a scalpel in the infraumbilical region. This was carried down to the underlying layer of fascia bluntly with a hemostat. The abdominal wall was then tented up and a Veress needle was inserted through the abdominal wall until intraperitoneal insertion was noted by a negative saline drop test. CO2 gas was then insufflated into the abdomen with an opening pressure of 6mmHg noted. Early liver tympany was also noted on percussion. The abdomen was then insufflated to a pressure of 15mmHg. The Veress needle was removed.       An 11 mm blunt disposable trocar was then introduced through the infraumbilical incision until intraperitoneal placement was noted. The obturator was removed from the trocar and the laparoscope was inserted through the port with the above findings noted.  Atraumatic entry was noted.  A suprapubic port site was chosen two fingerbreadths superior to the pubic symphysis in the midline. The intraperitoneal location of this point was confirmed by inserting the injection needle through the abdominal wall and noting the location of the needle with the laparoscope. The abdominal wall was infiltrated with 0.25% bupivacaine with epinephrine. A transverse  skin incision was made sharply with a scalpel, and a 5 mm operative port was then inserted through the abdominal wall under direct visualization with the laparoscope. The patient was placed in mild Trendelenburg. A Prestige grasper was then used to sweep the bowel out of the pelvis and the above findings were noted.  A second, accessory, port was placed in the left lower quadrant.  The intraperitoneal location of this point was confirmed by inserting the injection needle through the abdominal wall and noting the location of the needle with the laparoscope.  The abdominal wall was infiltrated with quarter percent bupivacaine with epinephrine.  An incision was made along Idalia's lines to accommodate a 5 mm operative port.  This was inserted through the abdominal wall under direct visualization with the laparoscope.  Atraumatic entry for both ports was noted.     First a survey of the pelvis was performed.  It appears that the cystic structure is noted was actually a right paratubal cyst that was free from the ovary.  Both ovaries appear normal and with smooth surfaces.  The uterine fundus and serosa was found to be smooth and without lesions. A survey of the remainder of the abdomen revealed the above findings.     The paratubal cyst was brought to the midline and an area away from the hilum was desiccated with the monopolar scissors and incised.  This allowed for undermining of the mesosalpinx from the cyst cavity.  The cyst was manipulated carefully with blunt dissection.  The final attachment point was incised with the monopolar scissors.  The remaining pedicle appeared hemostatic.    The working scope was then exchanged for the 30° 5 mm laparoscope. This was introduced through the left lower quadrant trocar and the specimen visualized. An Endo Catch bag was then inserted through the umbilical port and used to retrieve the specimen. The specimen bag was then removed from the abdomen.  At no time was the cyst  cavity spilled into the abdomen. Under direct visualization with the laparoscope, the infraumbilical trocar was then reinserted into the abdomen. The scope was then returned to the infraumbilical port and the mesosalpinx pedicle was then inspected off of pneumoperitoneum pressure and remained hemostatic.      As such, the suprapubic and left lower quadrant trocars were removed under direct visualization with the laparoscope.  The sites were noted to be hemostatic.  The abdomen was then desufflated completely of CO2 gas. The infraumbilical port was removed. S-retractors were then used to assist in location of the patient's fascia which was closed using a running stitch of 0 Vicryl. Palpation of the closure revealed no remaining defects. The skin was then closed with a running stitch of 4-0 Vicryl followed by surgical skin glue. The suprapubic and left lower quadrant incisions were closed with an buried interrupted stitch of 4-0 Vicryl. The Hulka tenaculum was then removed from the patient's uterus and excellent hemostasis was noted.      The patient tolerated the procedure well. There were no complications. Sponge, lap, needle, and instrument counts were reported to be correct. The patient was awakened and extubated and taken to the recovery room in stable and awake condition for later discharge home. Dr. Pisano was present throughout the entire procedure and performed the entire procedure.    Seamus Pisano MD, MS,  2/14/2022, 9:00 AM

## 2022-02-14 NOTE — ANESTHESIA TIME REPORT
Anesthesia Start and Stop Event Times     Date Time Event    2/14/2022 0715 Ready for Procedure     0752 Anesthesia Start     0902 Anesthesia Stop        Responsible Staff  02/14/22    Name Role Begin End    Bright Chavez M.D. Anesth 0752 0902        Preop Diagnosis (Free Text):  Pre-op Diagnosis     Ovarian Mass        Preop Diagnosis (Codes):    Premium Reason  C. Post-1st,2nd,3rd,OB,RTC    Comments:                                                                        shortness of breath/dizziness

## 2022-02-14 NOTE — ANESTHESIA PREPROCEDURE EVALUATION
Case: [id number] Date/Time: 02/14/22 0715    Procedures:       LAPAROSCOPY      EXCISION, CYST, OVARY (Right )    Pre-op diagnosis: N83.201    Location: [facility] ROOM 21 / SURGERY SAME DAY [facility]    Surgeons: Seamus Pisano M.D.          Relevant Problems   ANESTHESIA   (positive) TONYA (obstructive sleep apnea)      PULMONARY   (positive) Asthma exacerbation   (positive) Mild intermittent asthma without complication      NEURO   (positive) Seizures (HCC)      CARDIAC   (positive) Angina of effort (HCC)   (positive) Inappropriate sinus tachycardia   (positive) SVT (supraventricular tachycardia) (HCC)      GI   (positive) GERD (gastroesophageal reflux disease)         (positive) Fatty liver disease, nonalcoholic      ENDO   (positive) Hypothyroidism       Physical Exam    Airway   Mallampati: III  TM distance: >3 FB  Neck ROM: full       Cardiovascular - normal exam  Rhythm: regular  Rate: normal  (-) murmur     Dental - normal exam           Pulmonary - normal exam  Breath sounds clear to auscultation     Abdominal    Neurological - normal exam                 Anesthesia Plan    ASA 3 (see prob list)   ASA physical status 3 criteria: other (comment)    Plan - general       Airway plan will be ETT          Induction: intravenous    Postoperative Plan: Postoperative administration of opioids is intended.    Pertinent diagnostic labs and testing reviewed    Informed Consent:    Anesthetic plan and risks discussed with patient.    Use of blood products discussed with: patient whom consented to blood products.

## 2022-02-17 ENCOUNTER — APPOINTMENT (OUTPATIENT)
Dept: RADIOLOGY | Facility: MEDICAL CENTER | Age: 32
End: 2022-02-17
Attending: EMERGENCY MEDICINE
Payer: MEDICARE

## 2022-02-17 ENCOUNTER — HOSPITAL ENCOUNTER (EMERGENCY)
Facility: MEDICAL CENTER | Age: 32
End: 2022-02-17
Attending: EMERGENCY MEDICINE
Payer: MEDICARE

## 2022-02-17 VITALS
HEIGHT: 65 IN | OXYGEN SATURATION: 95 % | BODY MASS INDEX: 39.3 KG/M2 | RESPIRATION RATE: 16 BRPM | SYSTOLIC BLOOD PRESSURE: 107 MMHG | DIASTOLIC BLOOD PRESSURE: 57 MMHG | TEMPERATURE: 98.2 F | WEIGHT: 235.89 LBS | HEART RATE: 76 BPM

## 2022-02-17 DIAGNOSIS — R06.02 SHORTNESS OF BREATH: ICD-10-CM

## 2022-02-17 LAB
ALBUMIN SERPL BCP-MCNC: 4.4 G/DL (ref 3.2–4.9)
ALBUMIN/GLOB SERPL: 2 G/DL
ALP SERPL-CCNC: 101 U/L (ref 30–99)
ALT SERPL-CCNC: 16 U/L (ref 2–50)
ANION GAP SERPL CALC-SCNC: 15 MMOL/L (ref 7–16)
APTT PPP: 26.4 SEC (ref 24.7–36)
AST SERPL-CCNC: 15 U/L (ref 12–45)
BASOPHILS # BLD AUTO: 0.7 % (ref 0–1.8)
BASOPHILS # BLD: 0.04 K/UL (ref 0–0.12)
BILIRUB SERPL-MCNC: 0.3 MG/DL (ref 0.1–1.5)
BUN SERPL-MCNC: 13 MG/DL (ref 8–22)
CALCIUM SERPL-MCNC: 9.3 MG/DL (ref 8.5–10.5)
CHLORIDE SERPL-SCNC: 106 MMOL/L (ref 96–112)
CO2 SERPL-SCNC: 19 MMOL/L (ref 20–33)
CREAT SERPL-MCNC: 0.78 MG/DL (ref 0.5–1.4)
EKG IMPRESSION: NORMAL
EOSINOPHIL # BLD AUTO: 0.19 K/UL (ref 0–0.51)
EOSINOPHIL NFR BLD: 3.1 % (ref 0–6.9)
ERYTHROCYTE [DISTWIDTH] IN BLOOD BY AUTOMATED COUNT: 39.8 FL (ref 35.9–50)
GLOBULIN SER CALC-MCNC: 2.2 G/DL (ref 1.9–3.5)
GLUCOSE SERPL-MCNC: 119 MG/DL (ref 65–99)
HCT VFR BLD AUTO: 40.5 % (ref 37–47)
HGB BLD-MCNC: 14.4 G/DL (ref 12–16)
IMM GRANULOCYTES # BLD AUTO: 0.02 K/UL (ref 0–0.11)
IMM GRANULOCYTES NFR BLD AUTO: 0.3 % (ref 0–0.9)
INR PPP: 0.93 (ref 0.87–1.13)
LYMPHOCYTES # BLD AUTO: 1.94 K/UL (ref 1–4.8)
LYMPHOCYTES NFR BLD: 32 % (ref 22–41)
MCH RBC QN AUTO: 31.1 PG (ref 27–33)
MCHC RBC AUTO-ENTMCNC: 35.6 G/DL (ref 33.6–35)
MCV RBC AUTO: 87.5 FL (ref 81.4–97.8)
MONOCYTES # BLD AUTO: 0.42 K/UL (ref 0–0.85)
MONOCYTES NFR BLD AUTO: 6.9 % (ref 0–13.4)
NEUTROPHILS # BLD AUTO: 3.45 K/UL (ref 2–7.15)
NEUTROPHILS NFR BLD: 57 % (ref 44–72)
NRBC # BLD AUTO: 0 K/UL
NRBC BLD-RTO: 0 /100 WBC
PLATELET # BLD AUTO: 176 K/UL (ref 164–446)
PMV BLD AUTO: 11.4 FL (ref 9–12.9)
POTASSIUM SERPL-SCNC: 3.7 MMOL/L (ref 3.6–5.5)
PROT SERPL-MCNC: 6.6 G/DL (ref 6–8.2)
PROTHROMBIN TIME: 12.1 SEC (ref 12–14.6)
RBC # BLD AUTO: 4.63 M/UL (ref 4.2–5.4)
SODIUM SERPL-SCNC: 140 MMOL/L (ref 135–145)
TROPONIN T SERPL-MCNC: <6 NG/L (ref 6–19)
WBC # BLD AUTO: 6.1 K/UL (ref 4.8–10.8)

## 2022-02-17 PROCEDURE — 99284 EMERGENCY DEPT VISIT MOD MDM: CPT

## 2022-02-17 PROCEDURE — 85610 PROTHROMBIN TIME: CPT

## 2022-02-17 PROCEDURE — 85025 COMPLETE CBC W/AUTO DIFF WBC: CPT

## 2022-02-17 PROCEDURE — 36415 COLL VENOUS BLD VENIPUNCTURE: CPT

## 2022-02-17 PROCEDURE — 93005 ELECTROCARDIOGRAM TRACING: CPT

## 2022-02-17 PROCEDURE — 85730 THROMBOPLASTIN TIME PARTIAL: CPT

## 2022-02-17 PROCEDURE — 93005 ELECTROCARDIOGRAM TRACING: CPT | Performed by: EMERGENCY MEDICINE

## 2022-02-17 PROCEDURE — 700117 HCHG RX CONTRAST REV CODE 255: Performed by: EMERGENCY MEDICINE

## 2022-02-17 PROCEDURE — 71275 CT ANGIOGRAPHY CHEST: CPT | Mod: ME

## 2022-02-17 PROCEDURE — 93970 EXTREMITY STUDY: CPT | Mod: 26 | Performed by: INTERNAL MEDICINE

## 2022-02-17 PROCEDURE — 80053 COMPREHEN METABOLIC PANEL: CPT

## 2022-02-17 PROCEDURE — 93970 EXTREMITY STUDY: CPT

## 2022-02-17 PROCEDURE — 84484 ASSAY OF TROPONIN QUANT: CPT

## 2022-02-17 RX ADMIN — IOHEXOL 75 ML: 350 INJECTION, SOLUTION INTRAVENOUS at 08:11

## 2022-02-17 ASSESSMENT — FIBROSIS 4 INDEX: FIB4 SCORE: 0.35

## 2022-02-17 NOTE — ED TRIAGE NOTES
Kristin Balderrama  31 y.o. female  Chief Complaint   Patient presents with   • Chest Pain   • Shortness of Breath     Pt arrives with complaints of constant,dull, L sided chest pain  that began at 0230 and woke pt up from sleep. Pain radiates to L shoulder blade.     + SOB that started at same time, worse with exertion    S/P ovarian cyst removal on 2/14. No obvious swelling/redness in legs.     Vitals:    02/17/22 0444   BP: 129/89   Pulse: (!) 103   Resp: 18   Temp: 36.9 °C (98.5 °F)   SpO2: 96%       Triage process explained to patient, apologized for wait time, and returned to lobby.  Pt informed to notify staff of any change in condition.

## 2022-02-17 NOTE — ED PROVIDER NOTES
ED Provider Note        Primary care provider: Torres Brody M.D.    I verified that the patient was wearing a mask and I was wearing appropriate PPE every time I entered the room. The patient's mask was on the patient at all times during my encounter except for a brief view of the oropharynx.      CHIEF COMPLAINT  Chief Complaint   Patient presents with   • Chest Pain   • Shortness of Breath       HPI  Kristin Balderrama is a 31 y.o. female who presents to the Emergency Department with chief complaint of chest pain shortness of breath.  Patient has a history of PCOS.  Patient also had surgery 3 days prior for ovarian cyst removal.  Patient reports that she awoke this morning was having some heaviness in her chest and palpitations minor shortness of breath.  She also reports that she has had some pain and swelling in both of her lower legs.  She reports no headache no altered mental status no cough no congestion no abdominal pain no problems with urination or bowel movements her pain is moderate at this time without identified modifying factors.    REVIEW OF SYSTEMS  10 systems reviewed and otherwise negative, pertinent positives and negatives listed in the history of present illness.    PAST MEDICAL HISTORY   has a past medical history of Abdominal pain, Anginal syndrome, Apnea, sleep, Arrhythmia, Arthritis, ASTHMA, Atrial fibrillation (HCC), Back pain, Borderline personality disorder (HCC), Breath shortness, Bronchitis (02/08/2022), Cardiac arrhythmia, Chickenpox, Chronic UTI (9/18/2010), Cough, Daytime sleepiness, Dental disorder (03/08/2021), Depression, Diabetes (HCC), Diarrhea, Disorder of thyroid, Fall, Fatigue, Frequent headaches, Gasping for breath, Gynecological disorder, Headache(784.0), Heart burn, Heart murmur, History of falling, Indigestion, Migraine, Mitochondrial disease (HCC), 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HCC), Nausea, Obesity, Other fatigue (6/29/2020), Pain (08-15-12), Painful joint, PCOS  (polycystic ovarian syndrome), Pneumonia (2012 12/2020), Psychosis (HCC), Ringing in ears, Scoliosis, Shortness of breath, Sinus tachycardia (10/31/2013), Sleep apnea, Snoring, Tonsillitis, 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HCC), Tuberculosis, Urinary bladder disorder, Urinary incontinence, Weakness, and Wears glasses.    SURGICAL HISTORY   has a past surgical history that includes neuro dest facet l/s w/ig sngl (4/21/2015); recovery (1/27/2016); delmar by laparoscopy (8/29/2010); lumbar fusion anterior (8/21/2012); other cardiac surgery (1/2016); tonsillectomy; bowel stimulator insertion (7/13/2016); gastroscopy with balloon dilatation (N/A, 1/4/2017); muscle biopsy (Right, 1/26/2017); other abdominal surgery; laminotomy; bowel stimulator insertion (3/10/2021); lap,diagnostic abdomen (2/14/2022); and ovarian cystectomy (Right, 2/14/2022).    SOCIAL HISTORY  Social History     Tobacco Use   • Smoking status: Never Smoker   • Smokeless tobacco: Never Used   Vaping Use   • Vaping Use: Never used   Substance Use Topics   • Alcohol use: No     Alcohol/week: 0.0 oz   • Drug use: Not Currently     Frequency: 7.0 times per week     Types: Marijuana      Social History     Substance and Sexual Activity   Drug Use Not Currently   • Frequency: 7.0 times per week   • Types: Marijuana       FAMILY HISTORY  Non-Contributory    CURRENT MEDICATIONS  Home Medications     Reviewed by Shaista Gonzalez R.N. (Registered Nurse) on 02/17/22 at 0501  Med List Status: <None>   Medication Last Dose Status   albuterol 108 (90 Base) MCG/ACT Aero Soln inhalation aerosol  Active   diphenhydrAMINE HCl (BENADRYL PO)  Active   docusate sodium (COLACE) 100 MG Cap  Active   ibuprofen (MOTRIN) 600 MG Tab  Active   ipratropium-albuterol (DUONEB) 0.5-2.5 (3) MG/3ML nebulizer solution  Active   ivabradine (CORLANOR) 5 MG Tab tablet  Active   ivabradine (CORLANOR) 7.5 MG Tab tablet  Active   lactulose 20 GM/30ML Solution  Active   Melatonin 10 MG Tab  Active  "  oxyCODONE-acetaminophen (PERCOCET) 5-325 MG Tab  Active   traMADol (ULTRAM) 50 MG Tab  Active   traZODone (DESYREL) 100 MG Tab  Active   ziprasidone (GEODON) 40 MG Cap  Active                ALLERGIES  Allergies   Allergen Reactions   • Depakote [Divalproex Sodium] Unspecified     Muscle spasms/muscle pain and weakness     • Doxycycline Anaphylaxis and Vomiting     Other reaction(s): pustules/blisters   • Montelukast [Singulair] Unspecified     Cardiac effusion   • Vancomycin Itching     Pt becomes flushed in face and chest.   RXN=7/10/16   • Amitriptyline Unspecified     Headaches     • Aripiprazole [Abilify] Unspecified     Headaches/muscle twitching     • Clindamycin Nausea          • Flomax [Tamsulosin Hydrochloride] Swelling   • Metformin Unspecified     Increased lactic acid     Other reaction(s): itching and rash/nausea vomiting   • Tamsulosin Swelling     Swelling of legs   • Tape Rash     Tears skin off  coban with Tegaderm tape ok intermittently  RXN=ongoing   • Wound Dressing Adhesive Hives     By pt report   • Ampicillin Rash     Pt reports that she received a rash    • Ciprofloxacin Rash         • Keflex Rash     Pt states she gets a rash with this medication  Tolerates ceftriaxone  Can take with Benadryl   • Levofloxacin Unspecified     Leg muscle cramps   • Metronidazole Rash     \"Vision problems\"   • Penicillins Hives     Can take with Benadryl   • Sulfa Drugs Itching and Myalgia     Muscle pain and weakness   • Valproic Acid Rash     .       PHYSICAL EXAM  VITAL SIGNS: /89   Pulse (!) 103   Temp 36.9 °C (98.5 °F) (Temporal)   Resp 18   Ht 1.651 m (5' 5\")   Wt 107 kg (235 lb 14.3 oz)   SpO2 96%   BMI 39.25 kg/m²   Pulse ox interpretation: I interpret this pulse ox as normal.  Constitutional: Alert and oriented x 3, minimal distress  HEENT: Atraumatic normocephalic, pupils are equal round, extraocular movements are intact. The nares is clear, external ears are normal, mouth shows moist " mucous membranes  Neck: no obvious JVD or tracheal deviation  Cardiovascular: Tachycardic no murmur rub or gallop   Thorax & Lungs: No respiratory distress, no wheezes rales or rhonchi, No chest tenderness.   GI: Soft nontender nondistended positive bowel sounds, no peritoneal signs  Skin: Warm dry no obvious acute rash or lesion  Musculoskeletal: Moving all extremities with normal range strength, no acute  deformity no appreciable peripheral edema no popliteal fossa tenderness minor tenderness in bilateral inguinal folds.  Neurologic: Cranial nerves III through XII are grossly intact, no sensory deficit, no cerebellar dysfunction   Psychiatric: Appropriate affect for situation at this time      DIAGNOSTIC STUDIES / PROCEDURES  LABS      Results for orders placed or performed during the hospital encounter of 02/17/22   CBC with Differential   Result Value Ref Range    WBC 6.1 4.8 - 10.8 K/uL    RBC 4.63 4.20 - 5.40 M/uL    Hemoglobin 14.4 12.0 - 16.0 g/dL    Hematocrit 40.5 37.0 - 47.0 %    MCV 87.5 81.4 - 97.8 fL    MCH 31.1 27.0 - 33.0 pg    MCHC 35.6 (H) 33.6 - 35.0 g/dL    RDW 39.8 35.9 - 50.0 fL    Platelet Count 176 164 - 446 K/uL    MPV 11.4 9.0 - 12.9 fL    Neutrophils-Polys 57.00 44.00 - 72.00 %    Lymphocytes 32.00 22.00 - 41.00 %    Monocytes 6.90 0.00 - 13.40 %    Eosinophils 3.10 0.00 - 6.90 %    Basophils 0.70 0.00 - 1.80 %    Immature Granulocytes 0.30 0.00 - 0.90 %    Nucleated RBC 0.00 /100 WBC    Neutrophils (Absolute) 3.45 2.00 - 7.15 K/uL    Lymphs (Absolute) 1.94 1.00 - 4.80 K/uL    Monos (Absolute) 0.42 0.00 - 0.85 K/uL    Eos (Absolute) 0.19 0.00 - 0.51 K/uL    Baso (Absolute) 0.04 0.00 - 0.12 K/uL    Immature Granulocytes (abs) 0.02 0.00 - 0.11 K/uL    NRBC (Absolute) 0.00 K/uL   Complete Metabolic Panel (CMP)   Result Value Ref Range    Sodium 140 135 - 145 mmol/L    Potassium 3.7 3.6 - 5.5 mmol/L    Chloride 106 96 - 112 mmol/L    Co2 19 (L) 20 - 33 mmol/L    Anion Gap 15.0 7.0 - 16.0     Glucose 119 (H) 65 - 99 mg/dL    Bun 13 8 - 22 mg/dL    Creatinine 0.78 0.50 - 1.40 mg/dL    Calcium 9.3 8.5 - 10.5 mg/dL    AST(SGOT) 15 12 - 45 U/L    ALT(SGPT) 16 2 - 50 U/L    Alkaline Phosphatase 101 (H) 30 - 99 U/L    Total Bilirubin 0.3 0.1 - 1.5 mg/dL    Albumin 4.4 3.2 - 4.9 g/dL    Total Protein 6.6 6.0 - 8.2 g/dL    Globulin 2.2 1.9 - 3.5 g/dL    A-G Ratio 2.0 g/dL   Troponin   Result Value Ref Range    Troponin T <6 6 - 19 ng/L   PT/INR   Result Value Ref Range    PT 12.1 12.0 - 14.6 sec    INR 0.93 0.87 - 1.13   PTT   Result Value Ref Range    APTT 26.4 24.7 - 36.0 sec   ESTIMATED GFR   Result Value Ref Range    GFR If African American >60 >60 mL/min/1.73 m 2    GFR If Non African American >60 >60 mL/min/1.73 m 2   EKG   Result Value Ref Range    Report       [facility].    Test Date:  2022  Pt Name:    HARLEY DE LA CRUZ              Department: ER  MRN:        [id number]                      Room:  Gender:     Female                       Technician: [id number]  :        [id number]                   Requested By:ER TRIAGE PROTOCOL  Order #:    [id number]                    Reading MD: EDA GARCIA MD    Measurements  Intervals                                Axis  Rate:       92                           P:          16  MN:         134                          QRS:        9  QRSD:       90                           T:          12  QT:         372  QTc:        461    Interpretive Statements  SINUS RHYTHM  Compared to ECG 02/10/2022 10:26:51  No significant changes  Electronically Signed On 2022 5:59:03 PST by EDA GARCIA MD       *Note: Due to a large number of results and/or encounters for the requested time period, some results have not been displayed. A complete set of results can be found in Results Review.       All labs reviewed by me.      RADIOLOGY  US-EXTREMITY VENOUS LOWER BILAT   Final Result      CT-CTA CHEST PULMONARY ARTERY W/ RECONS  "  Final Result         1.  No evidence of pulmonary embolism.   2.  Trace bilateral pleural effusions.   3.  Clear lungs without consolidation.      Fleischner Society pulmonary nodule recommendations:   Not applicable            COURSE & MEDICAL DECISION MAKING  Pertinent Labs & Imaging studies reviewed. (See chart for details)    5:58 AM - Patient seen and examined at bedside.     Coagulation studies were ordered in light of chest pain and possibility of need for anticoagulation.    Patient noted to have slightly elevated blood pressure likely circumstantial secondary to presenting complaint. Referred to primary care physician for further evaluation.        Medical Decision Makin-year-old female presents with palpitations shortness of breath heaviness in her chest she has an Implanon on she recently had surgery she is obese.  High risk for pulmonary embolism.  CT PE protocol was obtained shows no evidence of any acute thrombosis or other acute abnormality.  Patient also had bilateral lower extremity ultrasound which does not demonstrate any deep venous thrombosis.  EKG is nonischemic her troponin is negative her vital signs remained stable throughout her time here.  She is discharged in stable and improved condition return here for any worsening chest pain shortness of breath swelling any other acute symptoms or concerns otherwise discharged stable and improved condition.    /89   Pulse (!) 103   Temp 36.9 °C (98.5 °F) (Temporal)   Resp 18   Ht 1.651 m (5' 5\")   Wt 107 kg (235 lb 14.3 oz)   SpO2 96%   BMI 39.25 kg/m²     Torres Brody M.D.  [address]  [place] [facility] [id number] [phone]    In 2 days      [facility], Emergency Dept  [address] [address] [phone]    in 12-24 hours if symptoms persist, immediately If symptoms worsen, or if you develop any other symptoms or concerns      New Prescriptions    No medications on file "       FINAL IMPRESSION  1. Shortness of breath           This dictation has been created using voice recognition software and/or scribes. The accuracy of the dictation is limited by the abilities of the software and the expertise of the scribes. I expect there may be some errors of grammar and possibly content. I made every attempt to manually correct the errors within my dictation. However, errors related to voice recognition software and/or scribes may still exist and should be interpreted within the appropriate context.

## 2022-02-21 ENCOUNTER — HOSPITAL ENCOUNTER (EMERGENCY)
Facility: MEDICAL CENTER | Age: 32
End: 2022-02-21
Attending: STUDENT IN AN ORGANIZED HEALTH CARE EDUCATION/TRAINING PROGRAM
Payer: MEDICARE

## 2022-02-21 ENCOUNTER — APPOINTMENT (OUTPATIENT)
Dept: RADIOLOGY | Facility: MEDICAL CENTER | Age: 32
End: 2022-02-21
Attending: STUDENT IN AN ORGANIZED HEALTH CARE EDUCATION/TRAINING PROGRAM
Payer: MEDICARE

## 2022-02-21 VITALS
SYSTOLIC BLOOD PRESSURE: 149 MMHG | TEMPERATURE: 98.7 F | RESPIRATION RATE: 15 BRPM | OXYGEN SATURATION: 97 % | HEART RATE: 80 BPM | BODY MASS INDEX: 39.85 KG/M2 | HEIGHT: 65 IN | DIASTOLIC BLOOD PRESSURE: 88 MMHG | WEIGHT: 239.2 LBS

## 2022-02-21 DIAGNOSIS — G89.18 POST-OP PAIN: Primary | ICD-10-CM

## 2022-02-21 DIAGNOSIS — E86.0 DEHYDRATION: ICD-10-CM

## 2022-02-21 LAB
ANION GAP SERPL CALC-SCNC: 15 MMOL/L (ref 7–16)
BASOPHILS # BLD AUTO: 0.5 % (ref 0–1.8)
BASOPHILS # BLD: 0.04 K/UL (ref 0–0.12)
BUN SERPL-MCNC: 12 MG/DL (ref 8–22)
CALCIUM SERPL-MCNC: 9.9 MG/DL (ref 8.5–10.5)
CHLORIDE SERPL-SCNC: 107 MMOL/L (ref 96–112)
CO2 SERPL-SCNC: 18 MMOL/L (ref 20–33)
CREAT SERPL-MCNC: 0.68 MG/DL (ref 0.5–1.4)
EOSINOPHIL # BLD AUTO: 0.22 K/UL (ref 0–0.51)
EOSINOPHIL NFR BLD: 2.7 % (ref 0–6.9)
ERYTHROCYTE [DISTWIDTH] IN BLOOD BY AUTOMATED COUNT: 41.1 FL (ref 35.9–50)
GLUCOSE SERPL-MCNC: 100 MG/DL (ref 65–99)
HCG SERPL QL: NEGATIVE
HCT VFR BLD AUTO: 39.9 % (ref 37–47)
HGB BLD-MCNC: 13.6 G/DL (ref 12–16)
IMM GRANULOCYTES # BLD AUTO: 0.04 K/UL (ref 0–0.11)
IMM GRANULOCYTES NFR BLD AUTO: 0.5 % (ref 0–0.9)
LYMPHOCYTES # BLD AUTO: 2.16 K/UL (ref 1–4.8)
LYMPHOCYTES NFR BLD: 27 % (ref 22–41)
MCH RBC QN AUTO: 30 PG (ref 27–33)
MCHC RBC AUTO-ENTMCNC: 34.1 G/DL (ref 33.6–35)
MCV RBC AUTO: 88.1 FL (ref 81.4–97.8)
MONOCYTES # BLD AUTO: 0.45 K/UL (ref 0–0.85)
MONOCYTES NFR BLD AUTO: 5.6 % (ref 0–13.4)
NEUTROPHILS # BLD AUTO: 5.1 K/UL (ref 2–7.15)
NEUTROPHILS NFR BLD: 63.7 % (ref 44–72)
NRBC # BLD AUTO: 0 K/UL
NRBC BLD-RTO: 0 /100 WBC
PLATELET # BLD AUTO: 170 K/UL (ref 164–446)
PMV BLD AUTO: 11.4 FL (ref 9–12.9)
POTASSIUM SERPL-SCNC: 4.2 MMOL/L (ref 3.6–5.5)
RBC # BLD AUTO: 4.53 M/UL (ref 4.2–5.4)
SODIUM SERPL-SCNC: 140 MMOL/L (ref 135–145)
WBC # BLD AUTO: 8 K/UL (ref 4.8–10.8)

## 2022-02-21 PROCEDURE — 74176 CT ABD & PELVIS W/O CONTRAST: CPT

## 2022-02-21 PROCEDURE — 36415 COLL VENOUS BLD VENIPUNCTURE: CPT

## 2022-02-21 PROCEDURE — 700102 HCHG RX REV CODE 250 W/ 637 OVERRIDE(OP): Performed by: STUDENT IN AN ORGANIZED HEALTH CARE EDUCATION/TRAINING PROGRAM

## 2022-02-21 PROCEDURE — 99283 EMERGENCY DEPT VISIT LOW MDM: CPT

## 2022-02-21 PROCEDURE — 80048 BASIC METABOLIC PNL TOTAL CA: CPT

## 2022-02-21 PROCEDURE — 84703 CHORIONIC GONADOTROPIN ASSAY: CPT

## 2022-02-21 PROCEDURE — A9270 NON-COVERED ITEM OR SERVICE: HCPCS | Performed by: STUDENT IN AN ORGANIZED HEALTH CARE EDUCATION/TRAINING PROGRAM

## 2022-02-21 PROCEDURE — 85025 COMPLETE CBC W/AUTO DIFF WBC: CPT

## 2022-02-21 RX ORDER — ACETAMINOPHEN 500 MG
1000 TABLET ORAL ONCE
Status: COMPLETED | OUTPATIENT
Start: 2022-02-21 | End: 2022-02-21

## 2022-02-21 RX ADMIN — ACETAMINOPHEN 1000 MG: 500 TABLET ORAL at 23:10

## 2022-02-21 ASSESSMENT — ENCOUNTER SYMPTOMS
ABDOMINAL PAIN: 1
FEVER: 1
VOMITING: 0
CHILLS: 1
DOUBLE VISION: 0
NAUSEA: 1
NECK PAIN: 0
FALLS: 0
COUGH: 0
BLURRED VISION: 0
LOSS OF CONSCIOUSNESS: 0
HEADACHES: 0
SORE THROAT: 0
SHORTNESS OF BREATH: 0

## 2022-02-21 ASSESSMENT — PAIN DESCRIPTION - PAIN TYPE: TYPE: ACUTE PAIN

## 2022-02-21 ASSESSMENT — FIBROSIS 4 INDEX: FIB4 SCORE: .6818181818181818182

## 2022-02-22 LAB — BLOOD CULTURE HOLD CXBCH: NORMAL

## 2022-02-22 NOTE — ED NOTES
PO challenge was passed. ERP is aware, he is doing a procedure and then will review results with pt to DC her. Pt asked for a ride home but pt has MTM and was informed of how to contact them.

## 2022-02-22 NOTE — ED TRIAGE NOTES
"Chief Complaint   Patient presents with   • Post-Op Complications     Pt had abdominal surgery on 2/14. Pt reports increase in bloating and pain. Pt reports the site felt warm and color change. Incision site is clean/dry/intact. Pt noted yellow discharge from site when cleaning.        Pt ambulated to triage for above complaint.  Pt is AO x 4, follows commands, and responds appropriately to questions. Patient's breathing is unlabored and pain is currently 7/10 on the 0-10 pain scale.  Pt placed in lobby. Patient educated on triage process and encouraged to alert staff for any changes.    /84   Pulse 97   Temp 35.8 °C (96.5 °F) (Temporal)   Resp 16   Ht 1.651 m (5' 5\")   Wt 109 kg (239 lb 3.2 oz)   SpO2 94%     "

## 2022-02-22 NOTE — ED NOTES
IV stick was unsuccessful. ERP could not get IV either. Pt will go to CT for a scan without contrast.

## 2022-02-22 NOTE — ED NOTES
Pt ambulated back to rm 26. Pt was placed in gown and hooked up to the monitor. Med student is at bedside. Will continue to monitor.

## 2022-02-22 NOTE — ED PROVIDER NOTES
ED Provider Note    Chief Complaint:   Abdominal pain    HPI:  Kristin Balderrama is a very pleasant 31-year-old female who presents 7 days postoperatively following an ovarian cystectomy with periumbilical pain, dull, nonradiating, moderate intensity with associated nausea.  Patient is passing stool and flatus without difficulty.  Patient endorses fevers and chills.  Patient also endorses worsening bloating.    Review of Systems:  Review of Systems   Constitutional: Positive for chills and fever.   HENT: Negative for congestion and sore throat.    Eyes: Negative for blurred vision and double vision.   Respiratory: Negative for cough and shortness of breath.    Cardiovascular: Negative for chest pain and leg swelling.   Gastrointestinal: Positive for abdominal pain and nausea. Negative for vomiting.   Genitourinary: Negative for dysuria and hematuria.   Musculoskeletal: Negative for falls and neck pain.   Skin: Negative for itching and rash.   Neurological: Negative for loss of consciousness and headaches.       Past Medical History:   has a past medical history of Abdominal pain, Anginal syndrome, Apnea, sleep, Arrhythmia, Arthritis, ASTHMA, Atrial fibrillation ([facility]), Back pain, Borderline personality disorder ([facility]), Breath shortness, Bronchitis (02/08/2022), Cardiac arrhythmia, Chickenpox, Chronic UTI (9/18/2010), Cough, Daytime sleepiness, Dental disorder (03/08/2021), Depression, Diabetes ([facility]), Diarrhea, Disorder of thyroid, Fall, Fatigue, Frequent headaches, Gasping for breath, Gynecological disorder, Headache(784.0), Heart burn, Heart murmur, History of falling, Indigestion, Migraine, 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    Social History:  Social History     Tobacco Use   • Smoking status: Never Smoker   • Smokeless tobacco: Never Used   Vaping Use   • Vaping Use: Never used   Substance and Sexual Activity   • Alcohol use: No     Alcohol/week: 0.0 oz   • Drug use: Not Currently     Frequency: 7.0 times per week     Types: Marijuana   • Sexual activity: Not Currently     Birth control/protection: Implant       Surgical History:   has a past surgical history that includes neuro dest facet l/s w/ig sngl (4/21/2015); recovery (1/27/2016); delmar by laparoscopy (8/29/2010); lumbar fusion anterior (8/21/2012); other cardiac surgery (1/2016); tonsillectomy; bowel stimulator insertion (7/13/2016); gastroscopy with balloon dilatation (N/A, 1/4/2017); muscle biopsy (Right, 1/26/2017); other abdominal surgery; laminotomy; bowel stimulator insertion (3/10/2021); lap,diagnostic abdomen (2/14/2022); and ovarian cystectomy (Right, 2/14/2022).    Allergies:  Allergies   Allergen Reactions   • Depakote [Divalproex Sodium] Unspecified     Muscle spasms/muscle pain and weakness     • Doxycycline Anaphylaxis and Vomiting     Other reaction(s): pustules/blisters   • Montelukast [Singulair] Unspecified     Cardiac effusion   • Vancomycin Itching     Pt becomes flushed in face and chest.   RXN=7/10/16   • Amitriptyline Unspecified     Headaches     • Aripiprazole [Abilify] Unspecified     Headaches/muscle twitching     • Clindamycin Nausea          • Flomax [Tamsulosin Hydrochloride] Swelling   • Metformin Unspecified     Increased lactic acid     Other reaction(s): itching and rash/nausea vomiting   • Tamsulosin Swelling     Swelling of legs   • Tape Rash     Tears skin off  coban with Tegaderm tape ok intermittently  RXN=ongoing   • Wound Dressing Adhesive Hives     By pt report   • Ampicillin Rash     Pt reports that she  "received a rash    • Ciprofloxacin Rash         • Keflex Rash     Pt states she gets a rash with this medication  Tolerates ceftriaxone  Can take with Benadryl   • Levofloxacin Unspecified     Leg muscle cramps   • Metronidazole Rash     \"Vision problems\"   • Penicillins Hives     Can take with Benadryl   • Sulfa Drugs Itching and Myalgia     Muscle pain and weakness   • Valproic Acid Rash     .       Physical Exam:  Vital Signs: /88   Pulse 80   Temp 37.1 °C (98.7 °F) (Temporal)   Resp 15   Ht 1.651 m (5' 5\")   Wt 109 kg (239 lb 3.2 oz)   LMP 02/15/2022   SpO2 97%   BMI 39.80 kg/m²   Physical Exam  Vitals and nursing note reviewed.   Constitutional:       Comments: Patient is lying in bed supine, pleasant, conversant, speaking in complete sentences   HENT:      Head: Normocephalic and atraumatic.   Eyes:      Extraocular Movements: Extraocular movements intact.      Conjunctiva/sclera: Conjunctivae normal.      Pupils: Pupils are equal, round, and reactive to light.   Cardiovascular:      Pulses: Normal pulses.      Comments: HR 95  Pulmonary:      Effort: Pulmonary effort is normal. No respiratory distress.   Abdominal:      Comments: Abdomen is soft, minimal periumbilical tenderness palpation, postoperative wound to the umbilicus demonstrates no erythema, purulent drainage, rigidity, induration   Musculoskeletal:         General: No swelling. Normal range of motion.      Cervical back: Normal range of motion. No rigidity.   Skin:     General: Skin is warm and dry.      Capillary Refill: Capillary refill takes less than 2 seconds.   Neurological:      Mental Status: She is alert.         Medical records reviewed for continuity of care.     Results for orders placed or performed during the hospital encounter of 02/21/22   Basic Metabolic Panel   Result Value Ref Range    Sodium 140 135 - 145 mmol/L    Potassium 4.2 3.6 - 5.5 mmol/L    Chloride 107 96 - 112 mmol/L    Co2 18 (L) 20 - 33 mmol/L    " Glucose 100 (H) 65 - 99 mg/dL    Bun 12 8 - 22 mg/dL    Creatinine 0.68 0.50 - 1.40 mg/dL    Calcium 9.9 8.5 - 10.5 mg/dL    Anion Gap 15.0 7.0 - 16.0   HCG QUAL SERUM   Result Value Ref Range    Beta-Hcg Qualitative Serum Negative Negative   ESTIMATED GFR   Result Value Ref Range    GFR If African American >60 >60 mL/min/1.73 m 2    GFR If Non African American >60 >60 mL/min/1.73 m 2   CBC WITH DIFFERENTIAL   Result Value Ref Range    WBC 8.0 4.8 - 10.8 K/uL    RBC 4.53 4.20 - 5.40 M/uL    Hemoglobin 13.6 12.0 - 16.0 g/dL    Hematocrit 39.9 37.0 - 47.0 %    MCV 88.1 81.4 - 97.8 fL    MCH 30.0 27.0 - 33.0 pg    MCHC 34.1 33.6 - 35.0 g/dL    RDW 41.1 35.9 - 50.0 fL    Platelet Count 170 164 - 446 K/uL    MPV 11.4 9.0 - 12.9 fL    Neutrophils-Polys 63.70 44.00 - 72.00 %    Lymphocytes 27.00 22.00 - 41.00 %    Monocytes 5.60 0.00 - 13.40 %    Eosinophils 2.70 0.00 - 6.90 %    Basophils 0.50 0.00 - 1.80 %    Immature Granulocytes 0.50 0.00 - 0.90 %    Nucleated RBC 0.00 /100 WBC    Neutrophils (Absolute) 5.10 2.00 - 7.15 K/uL    Lymphs (Absolute) 2.16 1.00 - 4.80 K/uL    Monos (Absolute) 0.45 0.00 - 0.85 K/uL    Eos (Absolute) 0.22 0.00 - 0.51 K/uL    Baso (Absolute) 0.04 0.00 - 0.12 K/uL    Immature Granulocytes (abs) 0.04 0.00 - 0.11 K/uL    NRBC (Absolute) 0.00 K/uL     *Note: Due to a large number of results and/or encounters for the requested time period, some results have not been displayed. A complete set of results can be found in Results Review.       Radiology:  CT-ABDOMEN-PELVIS W/O   Final Result         1.  Right nephrolithiasis without visualized obstructive changes.   2.  Hepatomegaly   3.  Small fat-containing right inguinal hernia   4.  Trace bilateral pleural effusions           MDM:  CBC to evaluate for acute anemia versus leukocytosis.  BMP to evaluate for acute kidney injury versus acute electrolyte abnormality.  hCG to evaluate for IUP versus ectopic.  CT abdomen pelvis to evaluate for  postoperative complications such as infection, dehiscence, free fluid, or other intra-abdominal acute process such as appendicitis, diverticulitis, small bowel obstruction.  Disposition pending work-up.    Electronically signed by: Abhishek Frances M.D., 2/21/2022, 8:10 PM    CBC demonstrates no acute anemia or leukocytosis.  BMP demonstrates low bicarb, patient reports that she is taking Zofran at home and attempting to increase oral intake.  Patient is tolerating oral intake at this time.  Beta hCG is negative, IUP versus ectopic are inconsistent with patient presentation at this time. Patient has small right nephrolithiasis without visualized obstructive changes, hepatomegaly and a small fat-containing right inguinal hernia with trace bilateral pleural effusions.  None of these findings on CT imaging would be an explanation for the patient's periumbilical pain.  The patient would like to go home at this time.  CT imaging with contrast was unable to be conducted due to multiple attempts of peripheral IV access and the risk-benefit for central venous catheter does not make sense at this time.  Disposition Home with outpatient follow-up.    Repeat physical exam benign.  I doubt any serious emergency process at this time.  Patient and/or family, friends given strict return precautions and care instructions. They have demonstrated understanding of discharge instructions through teach back mechanism. Advised PCP follow-up in 1-2 days.  Patient/family/friend expresses understanding and agrees to plan.    This dictation has been created using voice recognition software. I am continuously working with the software to minimize the number of voice recognition errors and I have made every attempt to manually correct the errors within my dictation. However errors  related to this voice recognition software may still exist and should be interpreted within the appropriate context.     Electronically signed by: Abhishek  DELIA Frances M.D., 2/22/2022 12:18 AM      Disposition:  Home    Final Impression:  1. Post-op pain    2. Dehydration

## 2022-02-26 ENCOUNTER — OFFICE VISIT (OUTPATIENT)
Dept: URGENT CARE | Facility: CLINIC | Age: 32
End: 2022-02-26
Payer: MEDICARE

## 2022-02-26 VITALS
HEIGHT: 65 IN | DIASTOLIC BLOOD PRESSURE: 80 MMHG | OXYGEN SATURATION: 95 % | TEMPERATURE: 97 F | HEART RATE: 100 BPM | WEIGHT: 234 LBS | RESPIRATION RATE: 20 BRPM | BODY MASS INDEX: 38.99 KG/M2 | SYSTOLIC BLOOD PRESSURE: 124 MMHG

## 2022-02-26 DIAGNOSIS — H66.004 RECURRENT ACUTE SUPPURATIVE OTITIS MEDIA OF RIGHT EAR WITHOUT SPONTANEOUS RUPTURE OF TYMPANIC MEMBRANE: ICD-10-CM

## 2022-02-26 DIAGNOSIS — H92.01 OTALGIA OF RIGHT EAR: ICD-10-CM

## 2022-02-26 DIAGNOSIS — H60.501 ACUTE OTITIS EXTERNA OF RIGHT EAR, UNSPECIFIED TYPE: ICD-10-CM

## 2022-02-26 PROCEDURE — 99213 OFFICE O/P EST LOW 20 MIN: CPT

## 2022-02-26 RX ORDER — AMOXICILLIN AND CLAVULANATE POTASSIUM 875; 125 MG/1; MG/1
1 TABLET, FILM COATED ORAL 2 TIMES DAILY
Qty: 14 TABLET | Refills: 0 | Status: SHIPPED | OUTPATIENT
Start: 2022-02-26 | End: 2022-03-05

## 2022-02-26 ASSESSMENT — ENCOUNTER SYMPTOMS
EYES NEGATIVE: 1
SHORTNESS OF BREATH: 0
CHILLS: 0
FEVER: 0
VOMITING: 0
NAUSEA: 0
COUGH: 0
SORE THROAT: 0
HEADACHES: 1
SPUTUM PRODUCTION: 0
MUSCULOSKELETAL NEGATIVE: 1
SINUS PAIN: 0
CARDIOVASCULAR NEGATIVE: 1

## 2022-02-26 ASSESSMENT — FIBROSIS 4 INDEX: FIB4 SCORE: .7058823529411764706

## 2022-02-26 NOTE — PROGRESS NOTES
Subjective     Kristin Balderrama is a 31 y.o. female who presents with Ear Drainage (Ear pressure/pain/5days)    Patient reports otalgia that started on Monday.  3 days prior, patient reports she noted yellowish drainage on her right ear.  This was accompanied by runny nose and right-sided headache she denies any fever, chills, congestion, cough, sore throat, nausea, vomiting.  She has not tried anything for relief.  c        Ear Drainage  This is a new problem. The current episode started in the past 7 days. The problem has been gradually worsening. Associated symptoms include headaches (Right sided). Pertinent negatives include no chills, congestion, coughing, fever, nausea, sore throat or vomiting. Nothing aggravates the symptoms. She has tried nothing for the symptoms.       Review of Systems   Constitutional: Negative for chills and fever.   HENT: Positive for ear pain (right) and tinnitus. Negative for congestion, sinus pain and sore throat.    Eyes: Negative.    Respiratory: Negative for cough, sputum production and shortness of breath.    Cardiovascular: Negative.    Gastrointestinal: Negative for nausea and vomiting.   Genitourinary: Negative.    Musculoskeletal: Negative.    Neurological: Positive for headaches (Right sided).     PMH:  has a past medical history of Abdominal pain, Anginal syndrome, Apnea, sleep, Arrhythmia, Arthritis, ASTHMA, Atrial fibrillation ([facility]), Back pain, Borderline personality disorder ([facility]), Breath shortness, Bronchitis (02/08/2022), Cardiac arrhythmia, Chickenpox, Chronic UTI (9/18/2010), Cough, Daytime sleepiness, Dental disorder (03/08/2021), Depression, Diabetes ([facility]), Diarrhea, Disorder of thyroid, Fall, Fatigue, Frequent headaches, Gasping for breath, Gynecological disorder, Headache(784.0), Heart burn, Heart murmur, History of falling, Indigestion, Migraine, 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  MEDS:   Current Outpatient Medications:   •  amoxicillin-clavulanate (AUGMENTIN) 875-125 MG Tab, Take 1 Tablet by mouth 2 times a day for 7 days., Disp: 14 Tablet, Rfl: 0  •  diphenhydrAMINE HCl (BENADRYL PO), Take 50 mg by mouth every day. Night time, Disp: , Rfl:   •  ivabradine (CORLANOR) 5 MG Tab tablet, Take 1 Tablet by mouth 2 times a day with meals., Disp: 60 Tablet, Rfl: 5  •  ivabradine (CORLANOR) 7.5 MG Tab tablet, Take 7.5 mg by mouth 1 time a day as needed., Disp: , Rfl:   •  ipratropium-albuterol (DUONEB) 0.5-2.5 (3) MG/3ML nebulizer solution, Take 3 mL by nebulization every four hours as needed for Shortness of Breath. Nebulizer, Disp: 360 mL, Rfl: 2  •  Melatonin 10 MG Tab, Take 10 mg by mouth every bedtime., Disp: , Rfl:   •  albuterol 108 (90 Base) MCG/ACT Aero Soln inhalation aerosol, Inhale 2 Puffs every 6 hours as needed for Shortness of Breath., Disp: 8.5 g, Rfl: 6  •  ibuprofen (MOTRIN) 600 MG Tab, Take 1 Tablet by mouth every 6 hours as needed. (Patient not taking: Reported on 2/26/2022), Disp: 30 Tablet, Rfl: 1  •  lactulose 20 GM/30ML Solution, Take 30 mL by mouth 2 times a day. (Patient not taking: Reported on 2/26/2022), Disp: 473 mL, Rfl: 0  •  docusate sodium (COLACE) 100 MG Cap, Take 1 Capsule by mouth 2 times a day. (Patient not taking: Reported on 2/26/2022), Disp: 60 Capsule, Rfl: 0  •  traZODone (DESYREL) 100 MG Tab, Take 100 mg by mouth every evening. (Patient not taking: Reported on 2/26/2022), Disp: , Rfl:   •  ziprasidone (GEODON) 40 MG Cap, TAKE 1 CAPSULE BY MOUTH TWICE A DAY. APPOINTMENT REQUIRED FOR ADDITIONAL REFILLS.  CALL SCHEDULING:  "[phone]. . (Patient taking differently: every day. TAKE 1 CAPSULE BY MOUTH TWICE A DAY. APPOINTMENT REQUIRED FOR ADDITIONAL REFILLS.  CALL [phone]. .), Disp: 60 Capsule, Rfl: 1  •  traMADol (ULTRAM) 50 MG Tab, Take 50 mg by mouth every four hours as needed. (Patient not taking: Reported on 2022), Disp: , Rfl:   ALLERGIES:   Allergies   Allergen Reactions   • Depakote [Divalproex Sodium] Unspecified     Muscle spasms/muscle pain and weakness     • Doxycycline Anaphylaxis and Vomiting     Other reaction(s): pustules/blisters   • Montelukast [Singulair] Unspecified     Cardiac effusion   • Vancomycin Itching     Pt becomes flushed in face and chest.   RXN=7/10/16   • Amitriptyline Unspecified     Headaches     • Aripiprazole [Abilify] Unspecified     Headaches/muscle twitching     • Clindamycin Nausea          • Flomax [Tamsulosin Hydrochloride] Swelling   • Metformin Unspecified     Increased lactic acid     Other reaction(s): itching and rash/nausea vomiting   • Tamsulosin Swelling     Swelling of legs   • Tape Rash     Tears skin off  coban with Tegaderm tape ok intermittently  RXN=ongoing   • Wound Dressing Adhesive Hives     By pt report   • Ampicillin Rash     Pt reports that she received a rash    • Ciprofloxacin Rash         • Keflex Rash     Pt states she gets a rash with this medication  Tolerates ceftriaxone  Can take with Benadryl   • Levofloxacin Unspecified     Leg muscle cramps   • Metronidazole Rash     \"Vision problems\"   • Penicillins Hives     Can take with Benadryl   • Sulfa Drugs Itching and Myalgia     Muscle pain and weakness   • Valproic Acid Rash     .     SURGHX:   Past Surgical History:   Procedure Laterality Date   • [place] LAP,DIAGNOSTIC ABDOMEN  2022    Procedure: LAPAROSCOPY;  Surgeon: Seamus Pisano M.D.;  Location: SURGERY SAME DAY [facility];  Service: Gynecology   • OVARIAN CYSTECTOMY Right 2022    Procedure: EXCISION, CYST, OVARY;  Surgeon: Seamus" "DELIA Pisano M.D.;  Location: SURGERY SAME DAY [facility];  Service: Gynecology   • BOWEL STIMULATOR INSERTION  3/10/2021    Procedure: INSERTION, ELECTRODE LEADS AND PULSE GENERATOR, NEUROSTIMULATOR, SACRAL - REMOVAL OF INTERSTIM WITH REPLACEMENT OF SACRAL NEUROMODULATION DEVICE;  Surgeon: Joe Noyola M.D.;  Location: SURGERY [facility];  Service: General   • MUSCLE BIOPSY Right 1/26/2017    Procedure: MUSCLE BIOPSY - THIGH;  Surgeon: Isidro Vigil M.D.;  Location: SURGERY [facility];  Service:    • GASTROSCOPY WITH BALLOON DILATATION N/A 1/4/2017    Procedure: GASTROSCOPY WITH DILATATION;  Surgeon: Torres Vargas M.D.;  Location: SURGERY [facility];  Service:    • BOWEL STIMULATOR INSERTION  7/13/2016    Procedure: BOWEL STIMULATOR INSERTION FOR PERMANENT INTERSTIM SACRAL IMPLANT;  Surgeon: Joe Noyola M.D.;  Location: SURGERY [facility];  Service:    • RECOVERY  1/27/2016    Procedure: CATH LAB EP STUDY WITH SINUS NODE MODIFICATION LA;  Surgeon: Recoveryonly Surgery;  Location: SURGERY PRE-POST PROC UNIT [facility];  Service:    • OTHER CARDIAC SURGERY  1/2016    cardiac ablation   • NEURO DEST FACET L/S W/IG SNGL  4/21/2015    Performed by Reza Tabor at [facility]   • LUMBAR FUSION ANTERIOR  8/21/2012    Performed by SUSIE SAWANT at [facility]   • ALYSSA BY LAPAROSCOPY  8/29/2010    Performed by SHAYY JOHNS at [place] [facility]   • LAMINOTOMY     • OTHER ABDOMINAL SURGERY     • TONSILLECTOMY      tonsillectomy     SOCHX:  reports that she has never smoked. She has never used smokeless tobacco. She reports previous drug use. Frequency: 7.00 times per week. Drug: Marijuana. She reports that she does not drink alcohol.  FH: Reviewed with patient, not pertinent to this visit.            Objective     /80 (BP Location: Left arm, Patient Position: Sitting)   Pulse 100   Temp 36.1 °C (97 °F) (Temporal)   Resp 20   Ht 1.651 m (5' 5\")   Wt 106 kg (234 lb)  "  LMP 02/15/2022   SpO2 95%   BMI 38.94 kg/m²      Physical Exam  Constitutional:       Appearance: Normal appearance.   HENT:      Head: Normocephalic.      Right Ear: Drainage, swelling and tenderness present. Tympanic membrane is injected and bulging.      Left Ear: Tympanic membrane, ear canal and external ear normal.      Mouth/Throat:      Mouth: Mucous membranes are moist.      Pharynx: No oropharyngeal exudate or posterior oropharyngeal erythema.   Eyes:      Extraocular Movements: Extraocular movements intact.   Cardiovascular:      Rate and Rhythm: Normal rate and regular rhythm.      Pulses: Normal pulses.      Heart sounds: Normal heart sounds.   Musculoskeletal:         General: Normal range of motion.      Cervical back: Normal range of motion.   Skin:     General: Skin is warm and dry.   Neurological:      Mental Status: She is alert.   Psychiatric:         Mood and Affect: Mood normal.         Behavior: Behavior normal.             Assessment & Plan       1. Recurrent acute suppurative otitis media of right ear without spontaneous rupture of tympanic membrane    - amoxicillin-clavulanate (AUGMENTIN) 875-125 MG Tab; Take 1 Tablet by mouth 2 times a day for 7 days.  Dispense: 14 Tablet; Refill: 0    2. Otalgia of right ear      3. Acute otitis externa of right ear, unspecified type    Patient's presentation is consistent with acute otitis externa, right and recurrent acute suppurative otitis media of the right ear without tympanic membrane rupture.  Patient has otalgia and yellowish discharge from the ear.  On PE, right ear is tender, TM erythematous and bulging.  Yellowish discharge is also noted on the right ear.  Discussed starting on Augmentin.  Though listed as an allergy, patient reports she has tolerated this in the past, especially after taking Benadryl with it.  Patient is agreeable with plan.  Verbalized understanding.    Differential diagnoses, supportive care, and indications for  immediate follow-up discussed with patient.     Pathogenesis of diagnosis discussed including typical length and natural progression.     Instructed to return to clinic or nearest emergency department for any change in condition, further concerns, or worsening of symptoms.      Electronically Signed by EDD Waters

## 2022-03-09 ENCOUNTER — OFFICE VISIT (OUTPATIENT)
Dept: SLEEP MEDICINE | Facility: MEDICAL CENTER | Age: 32
End: 2022-03-09
Payer: MEDICARE

## 2022-03-09 VITALS
BODY MASS INDEX: 39.32 KG/M2 | OXYGEN SATURATION: 95 % | DIASTOLIC BLOOD PRESSURE: 86 MMHG | SYSTOLIC BLOOD PRESSURE: 134 MMHG | RESPIRATION RATE: 16 BRPM | WEIGHT: 236 LBS | HEART RATE: 85 BPM | HEIGHT: 65 IN

## 2022-03-09 DIAGNOSIS — G47.33 OSA (OBSTRUCTIVE SLEEP APNEA): Primary | ICD-10-CM

## 2022-03-09 PROCEDURE — 99213 OFFICE O/P EST LOW 20 MIN: CPT | Performed by: STUDENT IN AN ORGANIZED HEALTH CARE EDUCATION/TRAINING PROGRAM

## 2022-03-09 ASSESSMENT — FIBROSIS 4 INDEX: FIB4 SCORE: .7058823529411764706

## 2022-03-09 NOTE — PROGRESS NOTES
[facility] Follow-up Visit    CC: Follow-up to discuss sleep study results    HPI:  Kristin Balderrama is a 31 y.o.female  with SVT, chronic pain, GERD, scoliosis, schizophrenia, chronic insomnia, asthma, obstructive sleep apnea, PTSD, morbid obesity.  Presents today to the discuss recent sleep study results.    She has known history of obstructive sleep apnea and tried CPAP several years ago.  She underwent recent PSG split-night study to reevaluate obstructive sleep apnea.  She reports night of the sleep study went well.  She was able to sleep with the mask on and did not find it too intrusive.  She still prefers a nasal pillow mask but wanted to try a full facemask in the lab.      Patient Active Problem List    Diagnosis Date Noted   • Inappropriate sinus tachycardia 09/24/2021   • Psychogenic disorder 06/24/2021   • Diplopia 06/23/2021   • Failure to thrive in adult 06/21/2021   • Acute bilateral low back pain with bilateral sciatica 06/16/2021   • Normal pressure hydrocephalus ([facility]) 06/04/2021   • GIB (gastrointestinal bleeding) 05/24/2021   • Angina of effort ([facility]) 04/26/2021   • Asthma exacerbation 04/15/2021   • Blood per rectum 04/15/2021   • Bilateral hand pain 04/05/2021   • Suprapubic catheter dysfunction ([facility]) 02/05/2021   • UTI (urinary tract infection) 10/11/2020   • Seizures ([facility]) 09/04/2020   • Dry eyes 08/18/2020   • 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[facility]) 08/15/2020   • Schizoaffective disorder, depressive type ([facility]) 03/02/2020   • PTSD (post-traumatic stress disorder) 03/02/2020   • Intracranial hypertension 02/27/2020   • Mixed stress and urge urinary incontinence 12/27/2019   • Mild intermittent asthma without complication 12/16/2019   • Immunocompromised ([facility]) 11/06/2019   • Hypokalemia 09/29/2019   • Optic neuritis 05/27/2019   • SVT (supraventricular tachycardia) ([facility]) 04/10/2019   • Muscular deconditioning 07/14/2018   • TONYA (obstructive sleep apnea) 01/09/2018   • Functional diarrhea  "01/05/2018   • Hypothyroidism 08/04/2017   • Depression 10/28/2016   • Schizophrenia (HCC) 10/27/2016   • Full incontinence of feces 07/13/2016   • Polypharmacy 06/27/2016   • Vitamin D deficiency 05/21/2016   • Lactic acidosis 04/19/2016   • Morbidly obese (HCC) 03/07/2016   • Scoliosis 03/07/2016   • GERD (gastroesophageal reflux disease) 03/07/2016   • Peripheral neuropathy and chronic pain syndrome (CMS-HCC) 03/06/2016   • Fatty liver disease, nonalcoholic 01/19/2015   • Anxiety 12/16/2014   • Hyperglycemia 09/05/2014   • Urinary retention 04/02/2011   • Dissociative identity disorder (HCC) 04/02/2011   • Borderline personality disorder in adult (HCC) 09/18/2010   • AP (abdominal pain) 09/18/2010       Past Medical History:   Diagnosis Date   • Abdominal pain    • Anginal syndrome     random chest pain especially with tachycardia   • Apnea, sleep    • Arrhythmia     \"sinus tachycardia\", cariologist, Dr. Kumar; ablation 2/2016   • Arthritis     osteo   • ASTHMA     when around smoke   • Atrial fibrillation (HCC)    • Back pain    • Borderline personality disorder (HCC)    • Breath shortness     with tachycardia   • Bronchitis 02/08/2022    Last time was 12/21   • Cardiac arrhythmia    • Chickenpox    • Chronic UTI 9/18/2010   • Cough    • Daytime sleepiness    • Dental disorder 03/08/2021    infected tooth   • Depression    • Diabetes (HCC)    • Diarrhea     incontinece   • Disorder of thyroid     Hashimoto's   • Fall    • Fatigue    • Frequent headaches    • Gasping for breath    • Gynecological disorder     PCOS   • Headache(784.0)    • Heart burn    • Heart murmur    • History of falling    • Indigestion    • Migraine    • Mitochondrial disease (HCC)    • 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HCC)    • Nausea    • Obesity    • Other fatigue 6/29/2020   • Pain 08-15-12    back, 7/10   • Painful joint    • PCOS (polycystic ovarian syndrome)    • Pneumonia 2012 12/2020   • Psychosis (HCC)    • Ringing in ears    • " "Scoliosis    • Shortness of breath     O2 as needed   • Sinus tachycardia 10/31/2013   • Sleep apnea     CPAP \"pulmonary doctor took me off mid year 2016\"   • Snoring    • Tonsillitis    • 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HCC)     2/8/22: Per pt: not anymore   • Tuberculosis     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   • Urinary bladder disorder     Suprapubic cath. 2/8/22: Not anymore.    • Urinary incontinence    • Weakness    • Wears glasses         Past Surgical History:   Procedure Laterality Date   • NJ LAP,DIAGNOSTIC ABDOMEN  2/14/2022    Procedure: LAPAROSCOPY;  Surgeon: Seamus Pisano M.D.;  Location: SURGERY SAME DAY [facility];  Service: Gynecology   • OVARIAN CYSTECTOMY Right 2/14/2022    Procedure: EXCISION, CYST, OVARY;  Surgeon: Seamus Pisano M.D.;  Location: SURGERY SAME DAY [facility];  Service: Gynecology   • BOWEL STIMULATOR INSERTION  3/10/2021    Procedure: INSERTION, ELECTRODE LEADS AND PULSE GENERATOR, NEUROSTIMULATOR, SACRAL - REMOVAL OF INTERSTIM WITH REPLACEMENT OF SACRAL NEUROMODULATION DEVICE;  Surgeon: Joe Noyola M.D.;  Location: SURGERY [facility];  Service: General   • MUSCLE BIOPSY Right 1/26/2017    Procedure: MUSCLE BIOPSY - THIGH;  Surgeon: Isidro Vigil M.D.;  Location: [facility];  Service:    • GASTROSCOPY WITH BALLOON DILATATION N/A 1/4/2017    Procedure: GASTROSCOPY WITH DILATATION;  Surgeon: Torres Vargas M.D.;  Location: [facility];  Service:    • BOWEL STIMULATOR INSERTION  7/13/2016    Procedure: BOWEL STIMULATOR INSERTION FOR PERMANENT INTERSTIM SACRAL IMPLANT;  Surgeon: Joe Noyola M.D.;  Location: [facility];  Service:    • RECOVERY  1/27/2016    Procedure: CATH LAB EP STUDY WITH SINUS NODE MODIFICATION ABHINAV;  Surgeon: [facility];  Location: SURGERY PRE-POST PROC UNIT [facility];  Service:    • OTHER CARDIAC SURGERY  1/2016    cardiac ablation   • NEURO DEST FACET L/S W/IG SNGL  4/21/2015    Performed by Reza Tabor at " [facility]   • LUMBAR FUSION ANTERIOR  8/21/2012    Performed by SUSIE SAWANT at SURGERY [facility]   • ALYSSA BY LAPAROSCOPY  8/29/2010    Performed by SHAYY JOHNS at SURGERY [facility]   • LAMINOTOMY     • OTHER ABDOMINAL SURGERY     • TONSILLECTOMY      tonsillectomy       Family History   Problem Relation Age of Onset   • Hypertension Mother    • Sleep Apnea Mother    • Heart Disease Mother    • Other Mother         hypothryod   • Hypertension Maternal Uncle    • Heart Disease Maternal Grandmother    • Hypertension Maternal Grandmother    • No Known Problems Sister    • Other Sister         Narcolepsy;fibromyalsia;bone;nerve   • Genitourinary () Problems Sister         endometriosis       Social History     Socioeconomic History   • Marital status: Single     Spouse name: Not on file   • Number of children: Not on file   • Years of education: Not on file   • Highest education level: Not on file   Occupational History   • Not on file   Tobacco Use   • Smoking status: Never Smoker   • Smokeless tobacco: Never Used   Vaping Use   • Vaping Use: Never used   Substance and Sexual Activity   • Alcohol use: No     Alcohol/week: 0.0 oz   • Drug use: Not Currently     Frequency: 7.0 times per week     Types: Marijuana   • Sexual activity: Not Currently     Birth control/protection: Implant   Other Topics Concern   • Not on file   Social History Narrative    ** Merged History Encounter **          Social Determinants of Health     Financial Resource Strain: Medium Risk   • Difficulty of Paying Living Expenses: Somewhat hard   Food Insecurity: Food Insecurity Present   • Worried About Running Out of Food in the Last Year: Sometimes true   • Ran Out of Food in the Last Year: Sometimes true   Transportation Needs: Unmet Transportation Needs   • Lack of Transportation (Medical): No   • Lack of Transportation (Non-Medical): Yes   Physical Activity: Not on file   Stress: Not on file   Social  Connections: Not on file   Intimate Partner Violence: Not on file   Housing Stability: Not on file       Current Outpatient Medications   Medication Sig Dispense Refill   • ibuprofen (MOTRIN) 600 MG Tab Take 1 Tablet by mouth every 6 hours as needed. 30 Tablet 1   • lactulose 20 GM/30ML Solution Take 30 mL by mouth 2 times a day. 473 mL 0   • docusate sodium (COLACE) 100 MG Cap Take 1 Capsule by mouth 2 times a day. 60 Capsule 0   • traZODone (DESYREL) 100 MG Tab Take 100 mg by mouth every evening.     • diphenhydrAMINE HCl (BENADRYL PO) Take 50 mg by mouth every day. Night time     • ivabradine (CORLANOR) 5 MG Tab tablet Take 1 Tablet by mouth 2 times a day with meals. 60 Tablet 5   • ivabradine (CORLANOR) 7.5 MG Tab tablet Take 7.5 mg by mouth 1 time a day as needed.     • ziprasidone (GEODON) 40 MG Cap TAKE 1 CAPSULE BY MOUTH TWICE A DAY. APPOINTMENT REQUIRED FOR ADDITIONAL REFILLS.  CALL [phone]. . (Patient taking differently: every day. TAKE 1 CAPSULE BY MOUTH TWICE A DAY. APPOINTMENT REQUIRED FOR ADDITIONAL REFILLS.  CALL [phone]. .) 60 Capsule 1   • traMADol (ULTRAM) 50 MG Tab Take 50 mg by mouth every four hours as needed.     • ipratropium-albuterol (DUONEB) 0.5-2.5 (3) MG/3ML nebulizer solution Take 3 mL by nebulization every four hours as needed for Shortness of Breath. Nebulizer 360 mL 2   • Melatonin 10 MG Tab Take 10 mg by mouth every bedtime.     • albuterol 108 (90 Base) MCG/ACT Aero Soln inhalation aerosol Inhale 2 Puffs every 6 hours as needed for Shortness of Breath. 8.5 g 6     No current facility-administered medications for this visit.        ALLERGIES: Depakote [divalproex sodium], Doxycycline, Montelukast [singulair], Vancomycin, Amitriptyline, Aripiprazole [abilify], Clindamycin, Flomax [tamsulosin hydrochloride], Metformin, Tamsulosin, Tape, Wound dressing adhesive, Ampicillin, Ciprofloxacin, Keflex, Levofloxacin, Metronidazole, Penicillins, Sulfa drugs,  "and Valproic acid    ROS  Constitutional: Denies fevers, Denies weight changes  Ears/Nose/Throat/Mouth: Denies nasal congestion or sore throat   Cardiovascular: Denies chest pain  Respiratory: Denies shortness of breath, Denies cough  Gastrointestinal/Hepatic: Denies nausea, vomiting  Sleep: see HPI      PHYSICAL EXAM  /86 (BP Location: Left arm, Patient Position: Sitting, BP Cuff Size: Adult)   Pulse 85   Resp 16   Ht 1.651 m (5' 5\")   Wt 107 kg (236 lb)   LMP 02/15/2022   SpO2 95%   BMI 39.27 kg/m²   Appearance: Well-nourished, well-developed, no acute distress  Eyes:  No scleral icterus , EOMI  ENMT: masked  Musculoskeletal:  Grossly normal; gait and station normal; digits and nails normal  Skin:  No rashes, petechiae, cyanosis  Neurologic: without focal signs; oriented to person, time, place, and purpose; judgement intact      Medical Decision Making   Assessment and Plan  Kristin Balderrama is a 31 y.o.female  with SVT, chronic pain, GERD, scoliosis, schizophrenia, chronic insomnia, asthma, obstructive sleep apnea, PTSD, morbid obesity.  Presents today to the discuss recent sleep study results.    The medical record was reviewed.    Obstructive sleep apnea   Reviewed recent PSG split-night with patient showing an AHI of 14.5, and Min Oxygen saturation of 83%.  Time below or at 88% saturation of 7.3 minutes.  Respiratory events were significantly worse in rem sleep with AHI of 52.  During treatment portion responded well to CPAP did best at 8 cmH2O  Based on sleep study and symptoms meets criteria for mild obstructive sleep apnea.   We discussed the pathophysiology of obstructive sleep apnea (TONYA) and risk factors for the disease. We also discussed possible consequences of untreated TONYA, including excessive daytime sleepiness and fatigue, cognitive dysfunction, cardiovascular complications such as elevated blood pressure, heart attacks, cardiac arrhythmias, and strokes. We discussed how TONYA " typically gets worse with age. We discussed treatment options for TONYA, including the gold standard therapy (PAP), alternative options such as a mandibular advancement device (custom-made oral appliances) and surgeries. We will proceed CPAP therapy.     RECOMMENDATIONS  -Start Auto CPAP at pressures 8-12 cm H2O  -Discussed importance of adherence/compliance   -Prescription generated for supplies   -Patient counseled to avoid driving when sleepy. Encouraged to anticipate sleepiness, consider taking a 10 min nap prior to driving, alternate with another , or pull over if sleepy while driving  -Advised to contact our office or myself with any questions via [facility]    Positive airway pressure will favorably impact many of the adverse conditions and effects provoked by TONYA.    Have advised the patient to follow up with the appropriate healthcare practitioners for all other medical problems and issues.    Return for 1-2 months after starting CPAP .      Please note portions of this record was created using voice recognition software. I have made every reasonable attempt to correct obvious errors, but I expect that there are errors of grammar and possibly content I did not discover before finalizing the note.

## 2022-03-09 NOTE — PATIENT INSTRUCTIONS
"CPAP and BPAP Information  CPAP and BPAP are methods of helping a person breathe with the use of air pressure. CPAP stands for \"continuous positive airway pressure.\" BPAP stands for \"bi-level positive airway pressure.\" In both methods, air is blown through your nose or mouth and into your air passages to help you breathe well.  CPAP and BPAP use different amounts of pressure to blow air. With CPAP, the amount of pressure stays the same while you breathe in and out. With BPAP, the amount of pressure is increased when you breathe in (inhale) so that you can take larger breaths. Your health care provider will recommend whether CPAP or BPAP would be more helpful for you.  Why are CPAP and BPAP treatments used?  CPAP or BPAP can be helpful if you have:  · Sleep apnea.  · Chronic obstructive pulmonary disease (COPD).  · Heart failure.  · Medical conditions that weaken the muscles of the chest including muscular dystrophy, or neurological diseases such as amyotrophic lateral sclerosis (ALS).  · Other problems that cause breathing to be weak, abnormal, or difficult.  CPAP is most commonly used for obstructive sleep apnea (TONYA) to keep the airways from collapsing when the muscles relax during sleep.  How is CPAP or BPAP administered?  Both CPAP and BPAP are provided by a small machine with a flexible plastic tube that attaches to a plastic mask. You wear the mask. Air is blown through the mask into your nose or mouth. The amount of pressure that is used to blow the air can be adjusted on the machine. Your health care provider will determine the pressure setting that should be used based on your individual needs.  When should CPAP or BPAP be used?  In most cases, the mask only needs to be worn during sleep. Generally, the mask needs to be worn throughout the night and during any daytime naps. People with certain medical conditions may also need to wear the mask at other times when they are awake. Follow instructions from your " health care provider about when to use the machine.  What are some tips for using the mask?    · Because the mask needs to be snug, some people feel trapped or closed-in (claustrophobic) when first using the mask. If you feel this way, you may need to get used to the mask. One way to do this is by holding the mask loosely over your nose or mouth and then gradually applying the mask more snugly. You can also gradually increase the amount of time that you use the mask.  · Masks are available in various types and sizes. Some fit over your mouth and nose while others fit over just your nose. If your mask does not fit well, talk with your health care provider about getting a different one.  · If you are using a mask that fits over your nose and you tend to breathe through your mouth, a chin strap may be applied to help keep your mouth closed.  · The CPAP and BPAP machines have alarms that may sound if the mask comes off or develops a leak.  · If you have trouble with the mask, it is very important that you talk with your health care provider about finding a way to make the mask easier to tolerate. Do not stop using the mask. Stopping the use of the mask could have a negative impact on your health.  What are some tips for using the machine?  · Place your CPAP or BPAP machine on a secure table or stand near an electrical outlet.  · Know where the on/off switch is located on the machine.  · Follow instructions from your health care provider about how to set the pressure on your machine and when you should use it.  · Do not eat or drink while the CPAP or BPAP machine is on. Food or fluids could get pushed into your lungs by the pressure of the CPAP or BPAP.  · Do not smoke. Tobacco smoke residue can damage the machine.  · For home use, CPAP and BPAP machines can be rented or purchased through home health care companies. Many different brands of machines are available. Renting a machine before purchasing may help you find out  which particular machine works well for you.  · Keep the CPAP or BPAP machine and attachments clean. Ask your health care provider for specific instructions.  Get help right away if:  · You have redness or open areas around your nose or mouth where the mask fits.  · You have trouble using the CPAP or BPAP machine.  · You cannot tolerate wearing the CPAP or BPAP mask.  · You have pain, discomfort, and bloating in your abdomen.  Summary  · CPAP and BPAP are methods of helping a person breathe with the use of air pressure.  · Both CPAP and BPAP are provided by a small machine with a flexible plastic tube that attaches to a plastic mask.  · If you have trouble with the mask, it is very important that you talk with your health care provider about finding a way to make the mask easier to tolerate.  This information is not intended to replace advice given to you by your health care provider. Make sure you discuss any questions you have with your health care provider.  Document Released: 09/15/2005 Document Revised: 04/08/2020 Document Reviewed: 11/06/2017  Elsevier Patient Education © 2020 [employer]

## 2022-03-11 ENCOUNTER — APPOINTMENT (OUTPATIENT)
Dept: RADIOLOGY | Facility: MEDICAL CENTER | Age: 32
End: 2022-03-11
Attending: COLON & RECTAL SURGERY
Payer: MEDICARE

## 2022-03-30 ENCOUNTER — HOSPITAL ENCOUNTER (EMERGENCY)
Facility: MEDICAL CENTER | Age: 32
End: 2022-03-30
Attending: EMERGENCY MEDICINE
Payer: MEDICARE

## 2022-03-30 ENCOUNTER — APPOINTMENT (OUTPATIENT)
Dept: RADIOLOGY | Facility: MEDICAL CENTER | Age: 32
End: 2022-03-30
Attending: EMERGENCY MEDICINE
Payer: MEDICARE

## 2022-03-30 VITALS
TEMPERATURE: 96.6 F | OXYGEN SATURATION: 99 % | BODY MASS INDEX: 42.03 KG/M2 | SYSTOLIC BLOOD PRESSURE: 135 MMHG | RESPIRATION RATE: 16 BRPM | WEIGHT: 237.22 LBS | HEART RATE: 85 BPM | HEIGHT: 63 IN | DIASTOLIC BLOOD PRESSURE: 91 MMHG

## 2022-03-30 DIAGNOSIS — R07.9 CHEST PAIN, UNSPECIFIED TYPE: ICD-10-CM

## 2022-03-30 LAB
ALBUMIN SERPL BCP-MCNC: 4.6 G/DL (ref 3.2–4.9)
ALBUMIN/GLOB SERPL: 1.9 G/DL
ALP SERPL-CCNC: 102 U/L (ref 30–99)
ALT SERPL-CCNC: 18 U/L (ref 2–50)
ANION GAP SERPL CALC-SCNC: 15 MMOL/L (ref 7–16)
AST SERPL-CCNC: 13 U/L (ref 12–45)
BASOPHILS # BLD AUTO: 0.5 % (ref 0–1.8)
BASOPHILS # BLD: 0.04 K/UL (ref 0–0.12)
BILIRUB SERPL-MCNC: 0.5 MG/DL (ref 0.1–1.5)
BUN SERPL-MCNC: 11 MG/DL (ref 8–22)
CALCIUM SERPL-MCNC: 9.8 MG/DL (ref 8.4–10.2)
CHLORIDE SERPL-SCNC: 109 MMOL/L (ref 96–112)
CO2 SERPL-SCNC: 20 MMOL/L (ref 20–33)
CREAT SERPL-MCNC: 0.79 MG/DL (ref 0.5–1.4)
EKG IMPRESSION: NORMAL
EOSINOPHIL # BLD AUTO: 0.07 K/UL (ref 0–0.51)
EOSINOPHIL NFR BLD: 0.9 % (ref 0–6.9)
ERYTHROCYTE [DISTWIDTH] IN BLOOD BY AUTOMATED COUNT: 41.8 FL (ref 35.9–50)
GFR SERPLBLD CREATININE-BSD FMLA CKD-EPI: 102 ML/MIN/1.73 M 2
GLOBULIN SER CALC-MCNC: 2.4 G/DL (ref 1.9–3.5)
GLUCOSE SERPL-MCNC: 89 MG/DL (ref 65–99)
HCT VFR BLD AUTO: 41.1 % (ref 37–47)
HGB BLD-MCNC: 14.1 G/DL (ref 12–16)
IMM GRANULOCYTES # BLD AUTO: 0.04 K/UL (ref 0–0.11)
IMM GRANULOCYTES NFR BLD AUTO: 0.5 % (ref 0–0.9)
LYMPHOCYTES # BLD AUTO: 2.19 K/UL (ref 1–4.8)
LYMPHOCYTES NFR BLD: 27.9 % (ref 22–41)
MCH RBC QN AUTO: 30.9 PG (ref 27–33)
MCHC RBC AUTO-ENTMCNC: 34.3 G/DL (ref 33.6–35)
MCV RBC AUTO: 89.9 FL (ref 81.4–97.8)
MONOCYTES # BLD AUTO: 0.62 K/UL (ref 0–0.85)
MONOCYTES NFR BLD AUTO: 7.9 % (ref 0–13.4)
NEUTROPHILS # BLD AUTO: 4.9 K/UL (ref 2–7.15)
NEUTROPHILS NFR BLD: 62.3 % (ref 44–72)
NRBC # BLD AUTO: 0 K/UL
NRBC BLD-RTO: 0 /100 WBC
PLATELET # BLD AUTO: 179 K/UL (ref 164–446)
PMV BLD AUTO: 11.9 FL (ref 9–12.9)
POTASSIUM SERPL-SCNC: 4.1 MMOL/L (ref 3.6–5.5)
PROT SERPL-MCNC: 7 G/DL (ref 6–8.2)
RBC # BLD AUTO: 4.57 M/UL (ref 4.2–5.4)
SODIUM SERPL-SCNC: 144 MMOL/L (ref 135–145)
TROPONIN T SERPL-MCNC: <6 NG/L (ref 6–19)
WBC # BLD AUTO: 7.9 K/UL (ref 4.8–10.8)

## 2022-03-30 PROCEDURE — 99283 EMERGENCY DEPT VISIT LOW MDM: CPT

## 2022-03-30 PROCEDURE — 93005 ELECTROCARDIOGRAM TRACING: CPT | Performed by: EMERGENCY MEDICINE

## 2022-03-30 PROCEDURE — 85025 COMPLETE CBC W/AUTO DIFF WBC: CPT

## 2022-03-30 PROCEDURE — 93005 ELECTROCARDIOGRAM TRACING: CPT

## 2022-03-30 PROCEDURE — 80053 COMPREHEN METABOLIC PANEL: CPT

## 2022-03-30 PROCEDURE — 84484 ASSAY OF TROPONIN QUANT: CPT

## 2022-03-30 PROCEDURE — 36415 COLL VENOUS BLD VENIPUNCTURE: CPT

## 2022-03-30 PROCEDURE — 700111 HCHG RX REV CODE 636 W/ 250 OVERRIDE (IP): Performed by: EMERGENCY MEDICINE

## 2022-03-30 PROCEDURE — 71045 X-RAY EXAM CHEST 1 VIEW: CPT

## 2022-03-30 RX ORDER — ONDANSETRON 4 MG/1
4 TABLET, ORALLY DISINTEGRATING ORAL ONCE
Status: COMPLETED | OUTPATIENT
Start: 2022-03-30 | End: 2022-03-30

## 2022-03-30 RX ORDER — ONDANSETRON 4 MG/1
4 TABLET, ORALLY DISINTEGRATING ORAL EVERY 6 HOURS PRN
Qty: 10 TABLET | Refills: 0 | Status: ON HOLD | OUTPATIENT
Start: 2022-03-30 | End: 2022-06-30 | Stop reason: SDUPTHER

## 2022-03-30 RX ORDER — SODIUM CHLORIDE, SODIUM LACTATE, POTASSIUM CHLORIDE, CALCIUM CHLORIDE 600; 310; 30; 20 MG/100ML; MG/100ML; MG/100ML; MG/100ML
1000 INJECTION, SOLUTION INTRAVENOUS ONCE
Status: DISCONTINUED | OUTPATIENT
Start: 2022-03-30 | End: 2022-03-30 | Stop reason: HOSPADM

## 2022-03-30 RX ORDER — ONDANSETRON 2 MG/ML
4 INJECTION INTRAMUSCULAR; INTRAVENOUS ONCE
Status: DISCONTINUED | OUTPATIENT
Start: 2022-03-30 | End: 2022-03-30 | Stop reason: HOSPADM

## 2022-03-30 RX ADMIN — ONDANSETRON 4 MG: 4 TABLET, ORALLY DISINTEGRATING ORAL at 19:45

## 2022-03-30 ASSESSMENT — LIFESTYLE VARIABLES
HAVE YOU EVER FELT YOU SHOULD CUT DOWN ON YOUR DRINKING: NO
DO YOU DRINK ALCOHOL: YES

## 2022-03-30 ASSESSMENT — FIBROSIS 4 INDEX: FIB4 SCORE: .7058823529411764706

## 2022-03-30 NOTE — ED TRIAGE NOTES
"PT amb to triage with   Chief Complaint   Patient presents with   • Chest Pain     Started at 0830 this am. Describes pain at pressure in nature, radiates from left chest to left neck   • Nausea     EKG completed prior to triage.   VSS at this time, pt informed to let triage nurse know of any changes.     /86   Pulse 86   Temp 36.2 °C (97.1 °F) (Temporal)   Resp (!) 22   Ht 1.6 m (5' 3\")   Wt 108 kg (237 lb 3.4 oz)   LMP 02/14/2022   SpO2 98%   BMI 42.02 kg/m²     "

## 2022-03-31 ENCOUNTER — TELEPHONE (OUTPATIENT)
Dept: CARDIOLOGY | Facility: MEDICAL CENTER | Age: 32
End: 2022-03-31
Payer: MEDICARE

## 2022-03-31 ENCOUNTER — HOSPITAL ENCOUNTER (EMERGENCY)
Facility: MEDICAL CENTER | Age: 32
End: 2022-03-31
Attending: EMERGENCY MEDICINE
Payer: MEDICARE

## 2022-03-31 ENCOUNTER — APPOINTMENT (OUTPATIENT)
Dept: RADIOLOGY | Facility: MEDICAL CENTER | Age: 32
End: 2022-03-31
Attending: EMERGENCY MEDICINE
Payer: MEDICARE

## 2022-03-31 VITALS
HEIGHT: 63 IN | TEMPERATURE: 98.6 F | WEIGHT: 236.99 LBS | HEART RATE: 68 BPM | DIASTOLIC BLOOD PRESSURE: 70 MMHG | OXYGEN SATURATION: 98 % | BODY MASS INDEX: 41.99 KG/M2 | RESPIRATION RATE: 15 BRPM | SYSTOLIC BLOOD PRESSURE: 112 MMHG

## 2022-03-31 DIAGNOSIS — R07.9 CHEST PAIN, UNSPECIFIED TYPE: ICD-10-CM

## 2022-03-31 LAB
ALBUMIN SERPL BCP-MCNC: 4.3 G/DL (ref 3.2–4.9)
ALBUMIN/GLOB SERPL: 2.3 G/DL
ALP SERPL-CCNC: 90 U/L (ref 30–99)
ALT SERPL-CCNC: 15 U/L (ref 2–50)
ANION GAP SERPL CALC-SCNC: 13 MMOL/L (ref 7–16)
AST SERPL-CCNC: 16 U/L (ref 12–45)
BASOPHILS # BLD AUTO: 0.8 % (ref 0–1.8)
BASOPHILS # BLD: 0.04 K/UL (ref 0–0.12)
BILIRUB SERPL-MCNC: 0.4 MG/DL (ref 0.1–1.5)
BUN SERPL-MCNC: 10 MG/DL (ref 8–22)
CALCIUM SERPL-MCNC: 9.1 MG/DL (ref 8.5–10.5)
CHLORIDE SERPL-SCNC: 108 MMOL/L (ref 96–112)
CO2 SERPL-SCNC: 19 MMOL/L (ref 20–33)
CREAT SERPL-MCNC: 0.71 MG/DL (ref 0.5–1.4)
D DIMER PPP IA.FEU-MCNC: <0.27 UG/ML (FEU) (ref 0–0.5)
EKG IMPRESSION: NORMAL
EOSINOPHIL # BLD AUTO: 0.11 K/UL (ref 0–0.51)
EOSINOPHIL NFR BLD: 2.1 % (ref 0–6.9)
ERYTHROCYTE [DISTWIDTH] IN BLOOD BY AUTOMATED COUNT: 42.1 FL (ref 35.9–50)
GFR SERPLBLD CREATININE-BSD FMLA CKD-EPI: 115 ML/MIN/1.73 M 2
GLOBULIN SER CALC-MCNC: 1.9 G/DL (ref 1.9–3.5)
GLUCOSE SERPL-MCNC: 125 MG/DL (ref 65–99)
HCG SERPL QL: NEGATIVE
HCT VFR BLD AUTO: 39.2 % (ref 37–47)
HGB BLD-MCNC: 13.1 G/DL (ref 12–16)
IMM GRANULOCYTES # BLD AUTO: 0.01 K/UL (ref 0–0.11)
IMM GRANULOCYTES NFR BLD AUTO: 0.2 % (ref 0–0.9)
LYMPHOCYTES # BLD AUTO: 1.51 K/UL (ref 1–4.8)
LYMPHOCYTES NFR BLD: 29.4 % (ref 22–41)
MCH RBC QN AUTO: 30.4 PG (ref 27–33)
MCHC RBC AUTO-ENTMCNC: 33.4 G/DL (ref 33.6–35)
MCV RBC AUTO: 91 FL (ref 81.4–97.8)
MONOCYTES # BLD AUTO: 0.5 K/UL (ref 0–0.85)
MONOCYTES NFR BLD AUTO: 9.7 % (ref 0–13.4)
NEUTROPHILS # BLD AUTO: 2.96 K/UL (ref 2–7.15)
NEUTROPHILS NFR BLD: 57.8 % (ref 44–72)
NRBC # BLD AUTO: 0 K/UL
NRBC BLD-RTO: 0 /100 WBC
PLATELET # BLD AUTO: 157 K/UL (ref 164–446)
PMV BLD AUTO: 11.8 FL (ref 9–12.9)
POTASSIUM SERPL-SCNC: 3.9 MMOL/L (ref 3.6–5.5)
PROT SERPL-MCNC: 6.2 G/DL (ref 6–8.2)
RBC # BLD AUTO: 4.31 M/UL (ref 4.2–5.4)
SODIUM SERPL-SCNC: 140 MMOL/L (ref 135–145)
TROPONIN T SERPL-MCNC: <6 NG/L (ref 6–19)
WBC # BLD AUTO: 5.1 K/UL (ref 4.8–10.8)

## 2022-03-31 PROCEDURE — 80053 COMPREHEN METABOLIC PANEL: CPT

## 2022-03-31 PROCEDURE — 84484 ASSAY OF TROPONIN QUANT: CPT

## 2022-03-31 PROCEDURE — 96372 THER/PROPH/DIAG INJ SC/IM: CPT

## 2022-03-31 PROCEDURE — 99283 EMERGENCY DEPT VISIT LOW MDM: CPT

## 2022-03-31 PROCEDURE — 93005 ELECTROCARDIOGRAM TRACING: CPT

## 2022-03-31 PROCEDURE — 36415 COLL VENOUS BLD VENIPUNCTURE: CPT

## 2022-03-31 PROCEDURE — 84703 CHORIONIC GONADOTROPIN ASSAY: CPT

## 2022-03-31 PROCEDURE — 71045 X-RAY EXAM CHEST 1 VIEW: CPT

## 2022-03-31 PROCEDURE — 93005 ELECTROCARDIOGRAM TRACING: CPT | Performed by: EMERGENCY MEDICINE

## 2022-03-31 PROCEDURE — 700111 HCHG RX REV CODE 636 W/ 250 OVERRIDE (IP): Performed by: EMERGENCY MEDICINE

## 2022-03-31 PROCEDURE — 85379 FIBRIN DEGRADATION QUANT: CPT

## 2022-03-31 PROCEDURE — 85025 COMPLETE CBC W/AUTO DIFF WBC: CPT

## 2022-03-31 RX ORDER — KETOROLAC TROMETHAMINE 30 MG/ML
15 INJECTION, SOLUTION INTRAMUSCULAR; INTRAVENOUS ONCE
Status: COMPLETED | OUTPATIENT
Start: 2022-03-31 | End: 2022-03-31

## 2022-03-31 RX ADMIN — KETOROLAC TROMETHAMINE 15 MG: 30 INJECTION, SOLUTION INTRAMUSCULAR at 11:08

## 2022-03-31 ASSESSMENT — FIBROSIS 4 INDEX: FIB4 SCORE: 0.55

## 2022-03-31 NOTE — ED PROVIDER NOTES
"ED Provider Note    CHIEF COMPLAINT  Chest pain    HPI  Kristin Balderrama is a 31 y.o. female who presents to the emergency department complaint of chest pain.  She states she had chest pain yesterday in the center of her chest, is constant nature, increases with deep breath and movement decreases with rest.  She was seen here yesterday for the same pain was in A. fib that broke.  Dr. Ch was consulted for the patient states he is fine to go home.  The patient continued pain this morning and was told to come back for evaluation.  Patient has swelling of her lower extremities, rash, fever, shakes, chills, sweats, cough.    REVIEW OF SYSTEMS  Positives as above. Pertinent negatives include nausea, vomiting, swelling of her lower extremities, hemoptysis, abdominal pain, dyspnea, cough.    All other 10 review of systems are negative    PAST MEDICAL HISTORY  Past Medical History:   Diagnosis Date   • Abdominal pain    • Anginal syndrome     random chest pain especially with tachycardia   • Apnea, sleep    • Arrhythmia     \"sinus tachycardia\", cariologist, Dr. Kumar; ablation 2/2016   • Arthritis     osteo   • ASTHMA     when around smoke   • Atrial fibrillation (HCC)    • Back pain    • Borderline personality disorder (HCC)    • Breath shortness     with tachycardia   • Bronchitis 02/08/2022    Last time was 12/21   • Cardiac arrhythmia    • Chickenpox    • Chronic UTI 9/18/2010   • Cough    • Daytime sleepiness    • Dental disorder 03/08/2021    infected tooth   • Depression    • Diabetes (HCC)    • Diarrhea     incontinece   • Disorder of thyroid     Hashimoto's   • Fall    • Fatigue    • Frequent headaches    • Gasping for breath    • Gynecological disorder     PCOS   • Headache(784.0)    • Heart burn    • Heart murmur    • History of falling    • Indigestion    • Migraine    • Mitochondrial disease (HCC)    • 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HCC)    • Nausea    • Obesity    • Other fatigue 6/29/2020   • Pain " "08-15-12    back, 7/10   • Painful joint    • PCOS (polycystic ovarian syndrome)    • Pneumonia 2012 12/2020   • Psychosis (HCC)    • Ringing in ears    • Scoliosis    • Shortness of breath     O2 as needed   • Sinus tachycardia 10/31/2013   • Sleep apnea     CPAP \"pulmonary doctor took me off mid year 2016\"   • Snoring    • Tonsillitis    • 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HCC)     2/8/22: Per pt: not anymore   • Tuberculosis     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   • Urinary bladder disorder     Suprapubic cath. 2/8/22: Not anymore.    • Urinary incontinence    • Weakness    • Wears glasses        FAMILY HISTORY  Noncontributory    SOCIAL HISTORY  Social History     Socioeconomic History   • Marital status: Single   Tobacco Use   • Smoking status: Never Smoker   • Smokeless tobacco: Never Used   Vaping Use   • Vaping Use: Never used   Substance and Sexual Activity   • Alcohol use: No     Alcohol/week: 0.0 oz   • Drug use: Not Currently     Frequency: 7.0 times per week     Types: Marijuana   • Sexual activity: Not Currently     Birth control/protection: Implant   Social History Narrative    ** Merged History Encounter **          Social Determinants of Health     Financial Resource Strain: Medium Risk   • Difficulty of Paying Living Expenses: Somewhat hard   Food Insecurity: Food Insecurity Present   • Worried About Running Out of Food in the Last Year: Sometimes true   • Ran Out of Food in the Last Year: Sometimes true   Transportation Needs: Unmet Transportation Needs   • Lack of Transportation (Medical): No   • Lack of Transportation (Non-Medical): Yes       SURGICAL HISTORY  Past Surgical History:   Procedure Laterality Date   • VT LAP,DIAGNOSTIC ABDOMEN  2/14/2022    Procedure: LAPAROSCOPY;  Surgeon: Seamus Pisano M.D.;  Location: SURGERY SAME DAY [facility];  Service: Gynecology   • OVARIAN CYSTECTOMY Right 2/14/2022    Procedure: EXCISION, CYST, OVARY;  Surgeon: Seamus Pisano M.D.;  Location: SURGERY " SAME DAY [facility];  Service: Gynecology   • BOWEL STIMULATOR INSERTION  3/10/2021    Procedure: INSERTION, ELECTRODE LEADS AND PULSE GENERATOR, NEUROSTIMULATOR, SACRAL - REMOVAL OF INTERSTIM WITH REPLACEMENT OF SACRAL NEUROMODULATION DEVICE;  Surgeon: Joe Noyola M.D.;  Location: SURGERY [facility];  Service: General   • MUSCLE BIOPSY Right 1/26/2017    Procedure: MUSCLE BIOPSY - THIGH;  Surgeon: Isidro Vigil M.D.;  Location: [facility];  Service:    • GASTROSCOPY WITH BALLOON DILATATION N/A 1/4/2017    Procedure: GASTROSCOPY WITH DILATATION;  Surgeon: Torres Vargas M.D.;  Location: [facility];  Service:    • BOWEL STIMULATOR INSERTION  7/13/2016    Procedure: BOWEL STIMULATOR INSERTION FOR PERMANENT INTERSTIM SACRAL IMPLANT;  Surgeon: Joe Noyola M.D.;  Location: [facility];  Service:    • RECOVERY  1/27/2016    Procedure: CATH LAB EP STUDY WITH SINUS NODE MODIFICATION LA;  Surgeon: Recoveryonly Surgery;  Location: SURGERY PRE-POST PROC UNIT [facility];  Service:    • OTHER CARDIAC SURGERY  1/2016    cardiac ablation   • NEURO DEST FACET L/S W/IG SNGL  4/21/2015    Performed by Reza Tabor at [facility]   • LUMBAR FUSION ANTERIOR  8/21/2012    Performed by SUSIE SAWANT at [facility]   • ALYSSA BY LAPAROSCOPY  8/29/2010    Performed by SHAYY JOHNS at [facility]   • LAMINOTOMY     • OTHER ABDOMINAL SURGERY     • TONSILLECTOMY      tonsillectomy       CURRENT MEDICATIONS  Home Medications    **Home medications have not yet been reviewed for this encounter**         ALLERGIES  Allergies   Allergen Reactions   • Depakote [Divalproex Sodium] Unspecified     Muscle spasms/muscle pain and weakness     • Doxycycline Anaphylaxis and Vomiting     Other reaction(s): pustules/blisters   • Montelukast [Singulair] Unspecified     Cardiac effusion   • Vancomycin Itching     Pt becomes flushed in face and chest.   RXN=7/10/16   •  "Amitriptyline Unspecified     Headaches     • Aripiprazole [Abilify] Unspecified     Headaches/muscle twitching     • Clindamycin Nausea          • Flomax [Tamsulosin Hydrochloride] Swelling   • Metformin Unspecified     Increased lactic acid     Other reaction(s): itching and rash/nausea vomiting   • Tamsulosin Swelling     Swelling of legs   • Tape Rash     Tears skin off  coban with Tegaderm tape ok intermittently  RXN=ongoing   • Wound Dressing Adhesive Hives     By pt report   • Ampicillin Rash     Pt reports that she received a rash    • Ciprofloxacin Rash         • Keflex Rash     Pt states she gets a rash with this medication  Tolerates ceftriaxone  Can take with Benadryl   • Levofloxacin Unspecified     Leg muscle cramps   • Metronidazole Rash     \"Vision problems\"   • Penicillins Hives     Can take with Benadryl   • Sulfa Drugs Itching and Myalgia     Muscle pain and weakness   • Valproic Acid Rash     .       PHYSICAL EXAM  VITAL SIGNS: /70   Pulse 68   Temp 37 °C (98.6 °F) (Temporal)   Resp 15   Ht 1.6 m (5' 3\")   Wt 107 kg (236 lb 15.9 oz)   SpO2 98%   BMI 41.98 kg/m²      Constitutional: Well developed, Well nourished, No acute distress, Non-toxic appearance.   Eyes: PERRLA, EOMI, Conjunctiva normal, No discharge.   Cardiovascular: Normal heart rate, Normal rhythm, No murmurs, No rubs, No gallops, and intact distal pulses.   Thorax & Lungs:  No respiratory distress, no rales, no rhonchi, No wheezing, No chest wall tenderness.   Abdomen: Bowel sounds normal, Soft, No tenderness, No guarding, No rebound, No pulsatile masses.   Skin: Warm, Dry, No erythema, No rash.   Extremities: Full range of motion, no deformity, no pedal edema.  Neurologic: Alert & oriented x 3, No focal deficits noted, acting appropriately on exam.  Psychiatric: Affect normal for clinical presentation.      LABORATORY/ECG  Results for orders placed or performed during the hospital encounter of 03/31/22   Complete " Metabolic Panel (CMP)   Result Value Ref Range    Sodium 140 135 - 145 mmol/L    Potassium 3.9 3.6 - 5.5 mmol/L    Chloride 108 96 - 112 mmol/L    Co2 19 (L) 20 - 33 mmol/L    Anion Gap 13.0 7.0 - 16.0    Glucose 125 (H) 65 - 99 mg/dL    Bun 10 8 - 22 mg/dL    Creatinine 0.71 0.50 - 1.40 mg/dL    Calcium 9.1 8.5 - 10.5 mg/dL    AST(SGOT) 16 12 - 45 U/L    ALT(SGPT) 15 2 - 50 U/L    Alkaline Phosphatase 90 30 - 99 U/L    Total Bilirubin 0.4 0.1 - 1.5 mg/dL    Albumin 4.3 3.2 - 4.9 g/dL    Total Protein 6.2 6.0 - 8.2 g/dL    Globulin 1.9 1.9 - 3.5 g/dL    A-G Ratio 2.3 g/dL   Troponin   Result Value Ref Range    Troponin T <6 6 - 19 ng/L   BETA-HCG QUALITATIVE SERUM   Result Value Ref Range    Beta-Hcg Qualitative Serum Negative Negative   D-DIMER   Result Value Ref Range    D-Dimer Screen <0.27 0.00 - 0.50 ug/mL (FEU)   CBC WITH DIFFERENTIAL   Result Value Ref Range    WBC 5.1 4.8 - 10.8 K/uL    RBC 4.31 4.20 - 5.40 M/uL    Hemoglobin 13.1 12.0 - 16.0 g/dL    Hematocrit 39.2 37.0 - 47.0 %    MCV 91.0 81.4 - 97.8 fL    MCH 30.4 27.0 - 33.0 pg    MCHC 33.4 (L) 33.6 - 35.0 g/dL    RDW 42.1 35.9 - 50.0 fL    Platelet Count 157 (L) 164 - 446 K/uL    MPV 11.8 9.0 - 12.9 fL    Neutrophils-Polys 57.80 44.00 - 72.00 %    Lymphocytes 29.40 22.00 - 41.00 %    Monocytes 9.70 0.00 - 13.40 %    Eosinophils 2.10 0.00 - 6.90 %    Basophils 0.80 0.00 - 1.80 %    Immature Granulocytes 0.20 0.00 - 0.90 %    Nucleated RBC 0.00 /100 WBC    Neutrophils (Absolute) 2.96 2.00 - 7.15 K/uL    Lymphs (Absolute) 1.51 1.00 - 4.80 K/uL    Monos (Absolute) 0.50 0.00 - 0.85 K/uL    Eos (Absolute) 0.11 0.00 - 0.51 K/uL    Baso (Absolute) 0.04 0.00 - 0.12 K/uL    Immature Granulocytes (abs) 0.01 0.00 - 0.11 K/uL    NRBC (Absolute) 0.00 K/uL   ESTIMATED GFR   Result Value Ref Range    GFR (CKD-EPI) 115 >60 mL/min/1.73 m 2   EKG (NOW)   Result Value Ref Range    Report       [facility] Emergency Dept.    Test Date:  2022-03-31  Pt  Name:    HARLEY DE LA CRUZ              Department: ER  MRN:        [id number]                      Room:       FL 76  Gender:     Female                       Technician: [id number]  :        [id number]                   Requested By:ER TRIAGE PROTOCOL  Order #:    [id number]                    Reading MD: BEN WHITAKER DO    Measurements  Intervals                                Axis  Rate:       71                           P:          18  FL:         138                          QRS:        18  QRSD:       89                           T:          24  QT:         458  QTc:        497    Interpretive Statements  Sinus rhythm  Prolonged QT interval  Compared to ECG 2022 16:27:43  Prolonged QT interval now present  Electronically Signed On 3- 9:40:37 PDT by BEN WHITAKER DO       *Note: Due to a large number of results and/or encounters for the requested time period, some results have not been displayed. A complete set of results can be found in Results Review.         RADIOLOGY/PROCEDURES  DX-CHEST-PORTABLE (1 VIEW)   Final Result      No acute cardiopulmonary abnormality.            COURSE & MEDICAL DECISION MAKING  Pertinent Labs & Imaging studies reviewed. (See chart for details)  This is a 31-year-old female presents with chest pain.  She has a heart score of 1.  EKG is negative, D-dimer is negative low pretest probability she does not have evidence of pulmonary embolism.  The patient has no clinical historical complaints or presentation is consistent with aortic dissection or aortic aneurysm.  Patient received Toradol with significant provement of symptoms.  Reevaluation, she is having significant provement, she has a normal heart rate, she is not in A. fib, she is speaking in full sentences.  She may have myofascial strain versus costochondritis is unknown.  The patient is discharged with strict return precautions.      FINAL IMPRESSION     1. Chest pain, unspecified type         DISPOSITION:  Patient will be discharged home in stable condition.    FOLLOW UP:  [facility], Emergency Dept  [address] [id number] [phone]    If symptoms worsen    Torres Brody M.D.  [address]  [facility] [id number] [phone]    Schedule an appointment as soon as possible for a visit   As needed      Electronically signed by: Jim Sandhu D.O., 3/31/2022 9:50 AM

## 2022-03-31 NOTE — ED NOTES
PT cleared for DC home pt in stable condition no s/s of acute distress vss pt to f/u with pcp no further questions pt advised to return to ED for any worsening symptoms

## 2022-03-31 NOTE — ED PROVIDER NOTES
"ED Provider Note    CHIEF COMPLAINT  Chief Complaint   Patient presents with   • Chest Pain     Started at 0830 this am. Describes pain at pressure in nature, radiates from left chest to left neck   • Nausea       HPI  Kristin Balderrama is a 31 y.o. female who presents for evaluation of symptoms including chest pain and pressure slightly left sided radiates down the left arm that started earlier today at 9 AM.  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  She has had persistent pressure in her chest for around 9 hours now.  She has a complex and extensive medical history including atrial fibrillation status post ablation asthma and some underlying self-reported heart disease.  She apparently has been followed by cardiology as well as electrophysiology for some cardiac dysrhythmias.  She recently had a event monitor with the last year and a half as well as an echo.  4 years ago which was normal.    REVIEW OF SYSTEMS  See HPI for further details. All other systems are negative.     PAST MEDICAL HISTORY  Past Medical History:   Diagnosis Date   • Bronchitis 02/08/2022    Last time was 12/21   • Dental disorder 03/08/2021    infected tooth   • Other fatigue 6/29/2020   • Sinus tachycardia 10/31/2013   • Pain 08-15-12    back, 7/10   • Chronic UTI 9/18/2010   • Abdominal pain    • Anginal syndrome     random chest pain especially with tachycardia   • Apnea, sleep    • Arrhythmia     \"sinus tachycardia\", cariologist, Dr. Kumar; ablation 2/2016   • Arthritis     osteo   • ASTHMA     when around smoke   • Atrial fibrillation (HCC)    • Back pain    • Borderline personality disorder (HCC)    • Breath shortness     with tachycardia   • Cardiac arrhythmia    • Chickenpox    • Cough    • Daytime sleepiness    • Depression    • Diabetes (HCC)    • Diarrhea     incontinece   • Disorder of thyroid     Hashimoto's   • Fall    • Fatigue    • Frequent headaches    • " "Gasping for breath    • Gynecological disorder     PCOS   • Headache(784.0)    • Heart burn    • Heart murmur    • History of falling    • Indigestion    • Migraine    • Mitochondrial disease (HCC)    • 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HCC)    • Nausea    • Obesity    • Painful joint    • PCOS (polycystic ovarian syndrome)    • Pneumonia 2012 12/2020   • Psychosis (HCC)    • Ringing in ears    • Scoliosis    • Shortness of breath     O2 as needed   • Sleep apnea     CPAP \"pulmonary doctor took me off mid year 2016\"   • Snoring    • Tonsillitis    • 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HCC)     2/8/22: Per pt: not anymore   • Tuberculosis     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   • Urinary bladder disorder     Suprapubic cath. 2/8/22: Not anymore.    • Urinary incontinence    • Weakness    • Wears glasses        FAMILY HISTORY  Noncontributory    SOCIAL HISTORY  Social History     Socioeconomic History   • Marital status: Single   Tobacco Use   • Smoking status: Never Smoker   • Smokeless tobacco: Never Used   Vaping Use   • Vaping Use: Never used   Substance and Sexual Activity   • Alcohol use: No     Alcohol/week: 0.0 oz   • Drug use: Not Currently     Frequency: 7.0 times per week     Types: Marijuana   • Sexual activity: Not Currently     Birth control/protection: Implant   Social History Narrative    ** Merged History Encounter **          Social Determinants of Health     Financial Resource Strain: Medium Risk   • Difficulty of Paying Living Expenses: Somewhat hard   Food Insecurity: Food Insecurity Present   • Worried About Running Out of Food in the Last Year: Sometimes true   • Ran Out of Food in the Last Year: Sometimes true   Transportation Needs: Unmet Transportation Needs   • Lack of Transportation (Medical): No   • Lack of Transportation (Non-Medical): Yes       SURGICAL HISTORY  Past Surgical History:   Procedure Laterality Date   • AL LAP,DIAGNOSTIC ABDOMEN  2/14/2022    Procedure: LAPAROSCOPY;  Surgeon: " Seamus Pisnao M.D.;  Location: SURGERY SAME DAY [facility];  Service: Gynecology   • OVARIAN CYSTECTOMY Right 2/14/2022    Procedure: EXCISION, CYST, OVARY;  Surgeon: Seamus Pisano M.D.;  Location: SURGERY SAME DAY [facility];  Service: Gynecology   • BOWEL STIMULATOR INSERTION  3/10/2021    Procedure: INSERTION, ELECTRODE LEADS AND PULSE GENERATOR, NEUROSTIMULATOR, SACRAL - REMOVAL OF INTERSTIM WITH REPLACEMENT OF SACRAL NEUROMODULATION DEVICE;  Surgeon: Joe Noyola M.D.;  Location: SURGERY [facility];  Service: General   • MUSCLE BIOPSY Right 1/26/2017    Procedure: MUSCLE BIOPSY - THIGH;  Surgeon: Isidro Vigil M.D.;  Location: [facility];  Service:    • GASTROSCOPY WITH BALLOON DILATATION N/A 1/4/2017    Procedure: GASTROSCOPY WITH DILATATION;  Surgeon: Torres Vargas M.D.;  Location: [facility];  Service:    • BOWEL STIMULATOR INSERTION  7/13/2016    Procedure: BOWEL STIMULATOR INSERTION FOR PERMANENT INTERSTIM SACRAL IMPLANT;  Surgeon: Joe Noyola M.D.;  Location: SURGERY [facility];  Service:    • RECOVERY  1/27/2016    Procedure: CATH LAB EP STUDY WITH SINUS NODE MODIFICATION ABHINAV;  Surgeon: Recoveryonly Surgery;  Location: SURGERY PRE-POST PROC UNIT [facility];  Service:    • OTHER CARDIAC SURGERY  1/2016    cardiac ablation   • NEURO DEST FACET L/S W/IG SNGL  4/21/2015    Performed by Reza Tabor at [facility]   • LUMBAR FUSION ANTERIOR  8/21/2012    Performed by SUSIE SAWANT at [facility]   • ALYSSA BY LAPAROSCOPY  8/29/2010    Performed by SHAYY JOHNS at [facility]   • LAMINOTOMY     • OTHER ABDOMINAL SURGERY     • TONSILLECTOMY      tonsillectomy       CURRENT MEDICATIONS    Current Facility-Administered Medications:   •  ondansetron (ZOFRAN) syringe/vial injection 4 mg, 4 mg, Intravenous, Once, Brian Castrejon M.D.  •  lactated ringers (LR) bolus, 1,000 mL, Intravenous, Once, Brian Castrejon M.D., Held at 03/30/22  1054    Current Outpatient Medications:   •  ibuprofen (MOTRIN) 600 MG Tab, Take 1 Tablet by mouth every 6 hours as needed., Disp: 30 Tablet, Rfl: 1  •  lactulose 20 GM/30ML Solution, Take 30 mL by mouth 2 times a day., Disp: 473 mL, Rfl: 0  •  docusate sodium (COLACE) 100 MG Cap, Take 1 Capsule by mouth 2 times a day., Disp: 60 Capsule, Rfl: 0  •  traZODone (DESYREL) 100 MG Tab, Take 100 mg by mouth every evening., Disp: , Rfl:   •  diphenhydrAMINE HCl (BENADRYL PO), Take 50 mg by mouth every day. Night time, Disp: , Rfl:   •  ivabradine (CORLANOR) 5 MG Tab tablet, Take 1 Tablet by mouth 2 times a day with meals., Disp: 60 Tablet, Rfl: 5  •  ivabradine (CORLANOR) 7.5 MG Tab tablet, Take 7.5 mg by mouth 1 time a day as needed., Disp: , Rfl:   •  ziprasidone (GEODON) 40 MG Cap, TAKE 1 CAPSULE BY MOUTH TWICE A DAY. APPOINTMENT REQUIRED FOR ADDITIONAL REFILLS.  CALL [phone]. . (Patient taking differently: every day. TAKE 1 CAPSULE BY MOUTH TWICE A DAY. APPOINTMENT REQUIRED FOR ADDITIONAL REFILLS.  CALL [phone]. .), Disp: 60 Capsule, Rfl: 1  •  traMADol (ULTRAM) 50 MG Tab, Take 50 mg by mouth every four hours as needed., Disp: , Rfl:   •  ipratropium-albuterol (DUONEB) 0.5-2.5 (3) MG/3ML nebulizer solution, Take 3 mL by nebulization every four hours as needed for Shortness of Breath. Nebulizer, Disp: 360 mL, Rfl: 2  •  Melatonin 10 MG Tab, Take 10 mg by mouth every bedtime., Disp: , Rfl:   •  albuterol 108 (90 Base) MCG/ACT Aero Soln inhalation aerosol, Inhale 2 Puffs every 6 hours as needed for Shortness of Breath., Disp: 8.5 g, Rfl: 6      ALLERGIES  Allergies   Allergen Reactions   • Depakote [Divalproex Sodium] Unspecified     Muscle spasms/muscle pain and weakness     • Doxycycline Anaphylaxis and Vomiting     Other reaction(s): pustules/blisters   • Montelukast [Singulair] Unspecified     Cardiac effusion   • Vancomycin Itching     Pt becomes flushed in face and chest.  "  RXN=7/10/16   • Amitriptyline Unspecified     Headaches     • Aripiprazole [Abilify] Unspecified     Headaches/muscle twitching     • Clindamycin Nausea          • Flomax [Tamsulosin Hydrochloride] Swelling   • Metformin Unspecified     Increased lactic acid     Other reaction(s): itching and rash/nausea vomiting   • Tamsulosin Swelling     Swelling of legs   • Tape Rash     Tears skin off  coban with Tegaderm tape ok intermittently  RXN=ongoing   • Wound Dressing Adhesive Hives     By pt report   • Ampicillin Rash     Pt reports that she received a rash    • Ciprofloxacin Rash         • Keflex Rash     Pt states she gets a rash with this medication  Tolerates ceftriaxone  Can take with Benadryl   • Levofloxacin Unspecified     Leg muscle cramps   • Metronidazole Rash     \"Vision problems\"   • Penicillins Hives     Can take with Benadryl   • Sulfa Drugs Itching and Myalgia     Muscle pain and weakness   • Valproic Acid Rash     .       PHYSICAL EXAM  VITAL SIGNS: /86   Pulse 86   Temp 36.2 °C (97.1 °F) (Temporal)   Resp (!) 22   Ht 1.6 m (5' 3\")   Wt 108 kg (237 lb 3.4 oz)   LMP 02/14/2022   SpO2 98%   BMI 42.02 kg/m²       Constitutional: Well developed, Well nourished, No acute distress, Non-toxic appearance.   HENT: Normocephalic, Atraumatic, Bilateral external ears normal, Oropharynx moist, No oral exudates, Nose normal.   Eyes: PERRLA, EOMI, Conjunctiva normal, No discharge.   Neck: Normal range of motion, No tenderness, Supple, No stridor.   Cardiovascular: Normal heart rate, Normal rhythm, No murmurs, No rubs, No gallops.   Thorax & Lungs: Normal breath sounds, No respiratory distress, No wheezing, No chest tenderness.   Abdomen: Bowel sounds normal, Soft, No tenderness, No masses, No pulsatile masses.   Skin: Warm, Dry, No erythema, No rash.   Extremities: Intact distal pulses, No edema, No tenderness, No cyanosis, No clubbing.   Musculoskeletal: Good range of motion in all major joints. No " tenderness to palpation or major deformities noted.   Neurologic: Alert & oriented x 3, Normal motor function, Normal sensory function, No focal deficits noted.   Psychiatric: Affect normal, Judgment normal, Mood normal.     DX-CHEST-PORTABLE (1 VIEW)   Final Result      Negative single view of the chest.        Results for orders placed or performed during the hospital encounter of 03/30/22   CBC with Differential   Result Value Ref Range    WBC 7.9 4.8 - 10.8 K/uL    RBC 4.57 4.20 - 5.40 M/uL    Hemoglobin 14.1 12.0 - 16.0 g/dL    Hematocrit 41.1 37.0 - 47.0 %    MCV 89.9 81.4 - 97.8 fL    MCH 30.9 27.0 - 33.0 pg    MCHC 34.3 33.6 - 35.0 g/dL    RDW 41.8 35.9 - 50.0 fL    Platelet Count 179 164 - 446 K/uL    MPV 11.9 9.0 - 12.9 fL    Neutrophils-Polys 62.30 44.00 - 72.00 %    Lymphocytes 27.90 22.00 - 41.00 %    Monocytes 7.90 0.00 - 13.40 %    Eosinophils 0.90 0.00 - 6.90 %    Basophils 0.50 0.00 - 1.80 %    Immature Granulocytes 0.50 0.00 - 0.90 %    Nucleated RBC 0.00 /100 WBC    Neutrophils (Absolute) 4.90 2.00 - 7.15 K/uL    Lymphs (Absolute) 2.19 1.00 - 4.80 K/uL    Monos (Absolute) 0.62 0.00 - 0.85 K/uL    Eos (Absolute) 0.07 0.00 - 0.51 K/uL    Baso (Absolute) 0.04 0.00 - 0.12 K/uL    Immature Granulocytes (abs) 0.04 0.00 - 0.11 K/uL    NRBC (Absolute) 0.00 K/uL   Comp Metabolic Panel   Result Value Ref Range    Sodium 144 135 - 145 mmol/L    Potassium 4.1 3.6 - 5.5 mmol/L    Chloride 109 96 - 112 mmol/L    Co2 20 20 - 33 mmol/L    Anion Gap 15.0 7.0 - 16.0    Glucose 89 65 - 99 mg/dL    Bun 11 8 - 22 mg/dL    Creatinine 0.79 0.50 - 1.40 mg/dL    Calcium 9.8 8.4 - 10.2 mg/dL    AST(SGOT) 13 12 - 45 U/L    ALT(SGPT) 18 2 - 50 U/L    Alkaline Phosphatase 102 (H) 30 - 99 U/L    Total Bilirubin 0.5 0.1 - 1.5 mg/dL    Albumin 4.6 3.2 - 4.9 g/dL    Total Protein 7.0 6.0 - 8.2 g/dL    Globulin 2.4 1.9 - 3.5 g/dL    A-G Ratio 1.9 g/dL   TROPONIN   Result Value Ref Range    Troponin T <6 6 - 19 ng/L   ESTIMATED GFR    Result Value Ref Range    GFR (CKD-EPI) 102 >60 mL/min/1.73 m 2   EKG   Result Value Ref Range    Report       [facility] Emergency Dept.    Test Date:  2022  Pt Name:    HARLEY DE LA CRUZ              Department: [facility]  MRN:        [id number]                      Room:  Gender:     Female                       Technician: EO  :        [id number]                   Requested By:ER TRIAGE PROTOCOL  Order #:    [id number]                    Reading MD: CUONG BENITES MD    Measurements  Intervals                                Axis  Rate:       90                           P:          23  MN:         136                          QRS:        11  QRSD:       103                          T:          30  QT:         375  QTc:        459    Interpretive Statements  Sinus rhythm  Compared to ECG 2022 04:54:04  No significant changes  Electronically Signed On 3- 18:18:38 PDT by CUONG BENITES MD       *Note: Due to a large number of results and/or encounters for the requested time period, some results have not been displayed. A complete set of results can be found in Results Review.   EKG interpretation by me rate 90 sinus rhythm no acute ST segment elevation or depression no pathological T wave inversions no suggestion of arrhythmia or ischemia    COURSE & MEDICAL DECISION MAKING  Pertinent Labs & Imaging studies reviewed. (See chart for details)  An IV was established.  I reviewed the patient's prior visit history as well as her cardiology work-ups.  She has no known history of coronary artery disease but rather dysrhythmia.  Here she has no suggestion of ischemia on EKG and troponin is nondetectable despite 9 hours of unremitting self-reported chest discomfort.  PE RC score is 0 therefore D-dimer testing was not pursued and she recently had a CT angiogram of the chest last month.  This is likely noncardiac related chest pain.  I have counseled her to follow-up with her  cardiologist Dr. Ch.  HEART score of 1    FINAL IMPRESSION  1.  Atypical chest pain         Electronically signed by: Brian Castrejon M.D., 3/30/2022 6:13 PM

## 2022-03-31 NOTE — TELEPHONE ENCOUNTER
Pt transferred directly to me. She states she is having chest pain and worsening SOB. She was evaluated and released from the ER yesterday d/t these s/s, pt states she has a-fib contributing to these s/s. ER notes from yesterday state that pt was in SR with no signs of ischemia. Pt is requesting appt w/ [facility] asap for f/u. [facility] does not have anything available sooner than her 4/14 appt, however, [facility] has openings tomorrow. Pt agrees to appt w/ DB tomorrow and is planning to go back to the ER today for worsening s/s.

## 2022-03-31 NOTE — ED NOTES
Lab called stated they needed another green top it was hemolized   Blood collected and sent unable to obtain IV acces

## 2022-03-31 NOTE — ED NOTES
"Pt comes in complaining of chest pain and SOB. Pt was seen yesterday for the same. Pt stating \"I feel drained\"  "

## 2022-03-31 NOTE — DISCHARGE INSTRUCTIONS
Your tests today show no signs of heart attack or other dangerous condition.  However sometimes these can develop even with normal tests.  Please follow-up as directed below, seek more immediate medical attention for any new chest pain, difficulty breathing, passing out or any other concerns.

## 2022-04-01 ENCOUNTER — NON-PROVIDER VISIT (OUTPATIENT)
Dept: CARDIOLOGY | Facility: MEDICAL CENTER | Age: 32
End: 2022-04-01
Payer: MEDICARE

## 2022-04-01 ENCOUNTER — OFFICE VISIT (OUTPATIENT)
Dept: CARDIOLOGY | Facility: MEDICAL CENTER | Age: 32
End: 2022-04-01
Payer: MEDICARE

## 2022-04-01 VITALS
OXYGEN SATURATION: 96 % | RESPIRATION RATE: 16 BRPM | WEIGHT: 242 LBS | DIASTOLIC BLOOD PRESSURE: 90 MMHG | SYSTOLIC BLOOD PRESSURE: 122 MMHG | BODY MASS INDEX: 40.32 KG/M2 | HEIGHT: 65 IN | HEART RATE: 88 BPM

## 2022-04-01 DIAGNOSIS — Z98.890 H/O PRIOR ABLATION TREATMENT: ICD-10-CM

## 2022-04-01 DIAGNOSIS — I49.3 PVC'S (PREMATURE VENTRICULAR CONTRACTIONS): ICD-10-CM

## 2022-04-01 DIAGNOSIS — Z86.79 HISTORY OF SUPRAVENTRICULAR TACHYCARDIA: ICD-10-CM

## 2022-04-01 DIAGNOSIS — Z79.899 HIGH RISK MEDICATION USE: ICD-10-CM

## 2022-04-01 DIAGNOSIS — R94.31 NONSPECIFIC ABNORMAL ELECTROCARDIOGRAM (ECG) (EKG): ICD-10-CM

## 2022-04-01 DIAGNOSIS — I47.11 INAPPROPRIATE SINUS TACHYCARDIA (HCC): ICD-10-CM

## 2022-04-01 DIAGNOSIS — I48.0 PAF (PAROXYSMAL ATRIAL FIBRILLATION) (HCC): ICD-10-CM

## 2022-04-01 DIAGNOSIS — I49.1 PREMATURE ATRIAL CONTRACTIONS: ICD-10-CM

## 2022-04-01 DIAGNOSIS — R07.89 OTHER CHEST PAIN: ICD-10-CM

## 2022-04-01 DIAGNOSIS — I49.3 PVC (PREMATURE VENTRICULAR CONTRACTION): ICD-10-CM

## 2022-04-01 LAB — EKG IMPRESSION: NORMAL

## 2022-04-01 PROCEDURE — 93000 ELECTROCARDIOGRAM COMPLETE: CPT | Performed by: INTERNAL MEDICINE

## 2022-04-01 PROCEDURE — 99214 OFFICE O/P EST MOD 30 MIN: CPT | Performed by: NURSE PRACTITIONER

## 2022-04-01 ASSESSMENT — ENCOUNTER SYMPTOMS
NAUSEA: 0
ORTHOPNEA: 0
FEVER: 0
SHORTNESS OF BREATH: 0
HEMOPTYSIS: 0
COUGH: 0
WHEEZING: 0
SPUTUM PRODUCTION: 0
DIZZINESS: 1
CHILLS: 0
CLAUDICATION: 0
HEADACHES: 0
PALPITATIONS: 1
PND: 0
VOMITING: 0

## 2022-04-01 ASSESSMENT — FIBROSIS 4 INDEX: FIB4 SCORE: 0.84

## 2022-04-01 NOTE — PROGRESS NOTES
Patient enrolled in the 14 day [employer] [facility] [place] program, per Kyra Mccain A.P.R.N.  >In clinic hook up, monitor serial #[id number].  > Baseline Transmitted.  >Pending EOS.

## 2022-04-01 NOTE — PROGRESS NOTES
"Chief Complaint   Patient presents with   • Other     F/V Dx: H/O prior ablation treatment   • Supraventricular Tachycardia (SVT)       Subjective     Kristin Balderrama is a 31 y.o. female who presents today for complex atrial arrhythmias, paroxysmal atrial fibrillation status post ablation in 2016, inappropriate sinus tachycardia on Corlanor therapy.     No structural heart disease found on transthoracic echocardiograms in the past.  I personally interpreted all the images of her transthoracic echocardiograms,  EKG tracings.     I have personally interpreted EKG today with patient, there is no evidence of acute coronary syndrome, no evidence of prior infarct, normal WY and QT interval, no significant conduction disease. Sinus rhythm.    4/1/2022: ECG was obtained today indicating sinus rhythm with heart rate 80 and borderline prolonged WY interval.  After measuring myself the WY interval is normal.  Patient was seen yesterday and the day before in the emergency department for chest pain.  ECG showed no signs of ischemia, D-dimer showed low pretest probability with no evidence of PE.  Patient had no presentation or complaints consistent with aortic dissection or aneurysm.  Patient was given Toradol with improvement to her symptoms.  Today she describes her symptoms as chest pain to the left of the sternum anteriorly which radiates up into her neck.  She reports that her hands are ice cold and this is unusual for her.  She reports palpitations and that her heart is \"dancing\".  She is somewhat lightheaded as well.    Past Medical History:   Diagnosis Date   • Abdominal pain    • Anginal syndrome     random chest pain especially with tachycardia   • Apnea, sleep    • Arrhythmia     \"sinus tachycardia\", cariologist, Dr. Kumar; ablation 2/2016   • Arthritis     osteo   • ASTHMA     when around smoke   • Atrial fibrillation (HCC)    • Back pain    • Borderline personality disorder (HCC)    • Breath shortness     with " "tachycardia   • Bronchitis 02/08/2022    Last time was 12/21   • Cardiac arrhythmia    • Chickenpox    • Chronic UTI 9/18/2010   • Cough    • Daytime sleepiness    • Dental disorder 03/08/2021    infected tooth   • Depression    • Diabetes (HCC)    • Diarrhea     incontinece   • Disorder of thyroid     Hashimoto's   • Fall    • Fatigue    • Frequent headaches    • Gasping for breath    • Gynecological disorder     PCOS   • Headache(784.0)    • Heart burn    • Heart murmur    • History of falling    • Indigestion    • Migraine    • Mitochondrial disease (HCC)    • 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HCC)    • Nausea    • Obesity    • Other fatigue 6/29/2020   • Pain 08-15-12    back, 7/10   • Painful joint    • PCOS (polycystic ovarian syndrome)    • Pneumonia 2012 12/2020   • Psychosis (HCC)    • Ringing in ears    • Scoliosis    • Shortness of breath     O2 as needed   • Sinus tachycardia 10/31/2013   • Sleep apnea     CPAP \"pulmonary doctor took me off mid year 2016\"   • Snoring    • Tonsillitis    • 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HCC)     2/8/22: Per pt: not anymore   • Tuberculosis     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   • Urinary bladder disorder     Suprapubic cath. 2/8/22: Not anymore.    • Urinary incontinence    • Weakness    • Wears glasses      Past Surgical History:   Procedure Laterality Date   • [place] LAP,DIAGNOSTIC ABDOMEN  2/14/2022    Procedure: LAPAROSCOPY;  Surgeon: Seamus Pisano M.D.;  Location: SURGERY SAME DAY [facility];  Service: Gynecology   • OVARIAN CYSTECTOMY Right 2/14/2022    Procedure: EXCISION, CYST, OVARY;  Surgeon: Seamus Pisano M.D.;  Location: SURGERY SAME DAY [facility];  Service: Gynecology   • BOWEL STIMULATOR INSERTION  3/10/2021    Procedure: INSERTION, ELECTRODE LEADS AND PULSE GENERATOR, NEUROSTIMULATOR, SACRAL - REMOVAL OF INTERSTIM WITH REPLACEMENT OF SACRAL NEUROMODULATION DEVICE;  Surgeon: Joe Noyola M.D.;  Location: SURGERY [facility];  Service: General   • MUSCLE " BIOPSY Right 1/26/2017    Procedure: MUSCLE BIOPSY - THIGH;  Surgeon: Isidro Vigil M.D.;  Location: SURGERY [facility];  Service:    • GASTROSCOPY WITH BALLOON DILATATION N/A 1/4/2017    Procedure: GASTROSCOPY WITH DILATATION;  Surgeon: Torres Vargas M.D.;  Location: SURGERY [facility];  Service:    • BOWEL STIMULATOR INSERTION  7/13/2016    Procedure: BOWEL STIMULATOR INSERTION FOR PERMANENT INTERSTIM SACRAL IMPLANT;  Surgeon: Joe Noyola M.D.;  Location: SURGERY [facility];  Service:    • RECOVERY  1/27/2016    Procedure: CATH LAB EP STUDY WITH SINUS NODE MODIFICATION LA;  Surgeon: [facility];  [place] [facility] [facility];  Service:    • OTHER CARDIAC SURGERY  1/2016    cardiac ablation   • NEURO DEST FACET L/S W/IG SNGL  4/21/2015    Performed by Reza Tabor at SURGERY [facility]   • LUMBAR FUSION ANTERIOR  8/21/2012    Performed by SUSIE SAWANT at SURGERY [facility]   • ALYSSA BY LAPAROSCOPY  8/29/2010    Performed by SHAYY JOHNS at SURGERY [facility]   • LAMINOTOMY     • OTHER ABDOMINAL SURGERY     • TONSILLECTOMY      tonsillectomy     Family History   Problem Relation Age of Onset   • Hypertension Mother    • Sleep Apnea Mother    • Heart Disease Mother    • Other Mother         hypothryod   • Hypertension Maternal Uncle    • Heart Disease Maternal Grandmother    • Hypertension Maternal Grandmother    • No Known Problems Sister    • Other Sister         Narcolepsy;fibromyalsia;bone;nerve   • Genitourinary () Problems Sister         endometriosis     Social History     Socioeconomic History   • Marital status: Single     Spouse name: Not on file   • Number of children: Not on file   • Years of education: Not on file   • Highest education level: Not on file   Occupational History   • Not on file   Tobacco Use   • Smoking status: Never Smoker   • Smokeless tobacco: Never Used   Vaping Use   • Vaping Use: Never used   Substance and Sexual  Activity   • Alcohol use: No     Alcohol/week: 0.0 oz   • Drug use: Not Currently     Frequency: 7.0 times per week     Types: Marijuana   • Sexual activity: Not Currently     Birth control/protection: Implant   Other Topics Concern   • Not on file   Social History Narrative    ** Merged History Encounter **          Social Determinants of Health     Financial Resource Strain: Medium Risk   • Difficulty of Paying Living Expenses: Somewhat hard   Food Insecurity: Food Insecurity Present   • Worried About Running Out of Food in the Last Year: Sometimes true   • Ran Out of Food in the Last Year: Sometimes true   Transportation Needs: Unmet Transportation Needs   • Lack of Transportation (Medical): No   • Lack of Transportation (Non-Medical): Yes   Physical Activity: Not on file   Stress: Not on file   Social Connections: Not on file   Intimate Partner Violence: Not on file   Housing Stability: Not on file     Allergies   Allergen Reactions   • Depakote [Divalproex Sodium] Unspecified     Muscle spasms/muscle pain and weakness     • Doxycycline Anaphylaxis and Vomiting     Other reaction(s): pustules/blisters   • Montelukast [Singulair] Unspecified     Cardiac effusion   • Vancomycin Itching     Pt becomes flushed in face and chest.   RXN=7/10/16   • Amitriptyline Unspecified     Headaches     • Aripiprazole [Abilify] Unspecified     Headaches/muscle twitching     • Clindamycin Nausea          • Flomax [Tamsulosin Hydrochloride] Swelling   • Metformin Unspecified     Increased lactic acid     Other reaction(s): itching and rash/nausea vomiting   • Tamsulosin Swelling     Swelling of legs   • Tape Rash     Tears skin off  coban with Tegaderm tape ok intermittently  RXN=ongoing   • Wound Dressing Adhesive Hives     By pt report   • Ampicillin Rash     Pt reports that she received a rash    • Ciprofloxacin Rash         • Keflex Rash     Pt states she gets a rash with this medication  Tolerates ceftriaxone  Can take with  "Benadryl   • Levofloxacin Unspecified     Leg muscle cramps   • Metronidazole Rash     \"Vision problems\"   • Penicillins Hives     Can take with Benadryl   • Sulfa Drugs Itching and Myalgia     Muscle pain and weakness   • Valproic Acid Rash     .     Outpatient Encounter Medications as of 2022   Medication Sig Dispense Refill   • ondansetron (ZOFRAN ODT) 4 MG TABLET DISPERSIBLE Take 1 Tablet by mouth every 6 hours as needed. 10 Tablet 0   • ibuprofen (MOTRIN) 600 MG Tab Take 1 Tablet by mouth every 6 hours as needed. 30 Tablet 1   • lactulose 20 GM/30ML Solution Take 30 mL by mouth 2 times a day. 473 mL 0   • docusate sodium (COLACE) 100 MG Cap Take 1 Capsule by mouth 2 times a day. 60 Capsule 0   • traZODone (DESYREL) 100 MG Tab Take 100 mg by mouth every evening.     • diphenhydrAMINE HCl (BENADRYL PO) Take 50 mg by mouth every day. Night time     • ivabradine (CORLANOR) 5 MG Tab tablet Take 1 Tablet by mouth 2 times a day with meals. 60 Tablet 5   • ivabradine (CORLANOR) 7.5 MG Tab tablet Take 7.5 mg by mouth 1 time a day as needed.     • ziprasidone (GEODON) 40 MG Cap TAKE 1 CAPSULE BY MOUTH TWICE A DAY. APPOINTMENT REQUIRED FOR ADDITIONAL REFILLS.  CALL [phone]. . (Patient taking differently: every day. TAKE 1 CAPSULE BY MOUTH TWICE A DAY. APPOINTMENT REQUIRED FOR ADDITIONAL REFILLS.  CALL [phone]. .) 60 Capsule 1   • traMADol (ULTRAM) 50 MG Tab Take 50 mg by mouth every four hours as needed.     • ipratropium-albuterol (DUONEB) 0.5-2.5 (3) MG/3ML nebulizer solution Take 3 mL by nebulization every four hours as needed for Shortness of Breath. Nebulizer 360 mL 2   • Melatonin 10 MG Tab Take 10 mg by mouth every bedtime.     • albuterol 108 (90 Base) MCG/ACT Aero Soln inhalation aerosol Inhale 2 Puffs every 6 hours as needed for Shortness of Breath. 8.5 g 6     No facility-administered encounter medications on file as of 2022.     Review of Systems   Constitutional: " "Negative for chills and fever.   Respiratory: Negative for cough, hemoptysis, sputum production, shortness of breath and wheezing.    Cardiovascular: Positive for chest pain and palpitations. Negative for orthopnea, claudication, leg swelling and PND.   Gastrointestinal: Negative for nausea and vomiting.   Neurological: Positive for dizziness. Negative for headaches.   All other systems reviewed and are negative.             Objective     /90 (BP Location: Left arm, Patient Position: Sitting, BP Cuff Size: Adult)   Pulse 88   Resp 16   Ht 1.651 m (5' 5\")   Wt 110 kg (242 lb)   SpO2 96%   BMI 40.27 kg/m²     Physical Exam  Vitals and nursing note reviewed.   Constitutional:       Appearance: She is well-developed.   Neck:      Vascular: No JVD.   Cardiovascular:      Rate and Rhythm: Normal rate and regular rhythm.      Heart sounds: Normal heart sounds. No murmur heard.  Pulmonary:      Effort: Pulmonary effort is normal.      Breath sounds: Normal breath sounds.   Abdominal:      Palpations: Abdomen is soft.   Musculoskeletal:      Cervical back: Neck supple.   Skin:     General: Skin is warm and dry.   Neurological:      Comments: Cranial nerves II-XII WNL   Psychiatric:         Behavior: Behavior normal.         Thought Content: Thought content normal.         Judgment: Judgment normal.            03/01/2017 ECHOCARDIOGRAM  Left ventricular ejection fraction 60%.  No valvular disease.      ECHO  3/6/2021  FINDINGS  Left Ventricle  Normal left ventricular size, wall thickness, and systolic function.   Left ventricular ejection fraction is visually estimated to be 60%.     Right Ventricle  Normal right ventricular size and systolic function.     Right Atrium  Normal right atrial size.     Left Atrium  Normal left atrial size.     Mitral Valve  Structurally normal mitral valve without significant stenosis or   regurgitation.     Aortic Valve  Structurally normal aortic valve without significant stenosis " or   regurgitation.     Tricuspid Valve  Structurally normal tricuspid valve. No tricuspid stenosis. Mild   tricuspid regurgitation. Right ventricular systolic pressure is   estimated to be 18 mmHg. Right atrial pressure is estimated to be 3   mmHg.     Pulmonic Valve  Structurally normal pulmonic valve without significant stenosis or   regurgitation.     Pericardium  Normal pericardium without effusion.     Aorta  Normal aortic root for body surface area.         Myocardial Perfusion  4/6/2017   No myocardial infarct or inducible ischemia.   Normal LEFT ventricular function.     Cardiac event recorder 8/31/2020  ZioPatch Summary:   1. Sinus rhythm with appropriate heart rate range. Average 73, range 52 to 117 bpm.   2. Normal sinus node and AV node conduction normal. No pauses.   3. Rare PACs.   4. Rare PVCs.   5. No sustained rhythm changes. No atrial fibrillation/flutter.   6. 5 patient triggers, symptoms reported include fluttering and racing, shaky during sinus, 73 to 90 bpm..   Conclusion: Normal monitor.  Sinus rhythm with rare PVCs and PACs.  Symptoms during sinus rhythm.     Assessment & Plan     1. PAF (paroxysmal atrial fibrillation) (HCC)     2. High risk medication use     3. History of supraventricular tachycardia     4. Inappropriate sinus tachycardia     5. PVC (premature ventricular contraction)     6. H/O prior ablation treatment     7. Nonspecific abnormal electrocardiogram (ECG) (EKG)     8. Other chest pain  EKG       Medical Decision Making: Today's Assessment/Status/Plan:   1.  2-week bio tell monitor as the patient had issues with skin irritation from Zio patch.  2.  Follow-up with results  3.  She may need to be seen in EP again, this will be determined with the results of her monitor.

## 2022-04-08 ENCOUNTER — OFFICE VISIT (OUTPATIENT)
Dept: URGENT CARE | Facility: CLINIC | Age: 32
End: 2022-04-08
Payer: MEDICARE

## 2022-04-08 VITALS
DIASTOLIC BLOOD PRESSURE: 78 MMHG | OXYGEN SATURATION: 95 % | BODY MASS INDEX: 42.1 KG/M2 | RESPIRATION RATE: 16 BRPM | WEIGHT: 237.6 LBS | TEMPERATURE: 97 F | HEART RATE: 104 BPM | HEIGHT: 63 IN | SYSTOLIC BLOOD PRESSURE: 126 MMHG

## 2022-04-08 DIAGNOSIS — H92.01 RIGHT EAR PAIN: ICD-10-CM

## 2022-04-08 DIAGNOSIS — J06.9 VIRAL URI WITH COUGH: ICD-10-CM

## 2022-04-08 PROCEDURE — 99213 OFFICE O/P EST LOW 20 MIN: CPT | Performed by: PHYSICIAN ASSISTANT

## 2022-04-08 ASSESSMENT — FIBROSIS 4 INDEX: FIB4 SCORE: 0.84

## 2022-04-08 NOTE — PROGRESS NOTES
Subjective:   Kristin Balderrama is a 31 y.o. female who presents for Otalgia (Cough, sore throat, x 2-3  days)      HPI  Patient is a 31-year-old female who presents to clinic with complaints of right ear pain, cough, sore throat.  The right ear pain started 1 week ago.  She was seen by her PCP who placed her on azithromycin.  The ear pain has not subsided.  She developed a cough and a sore throat 2 days ago.  The cough is a deep cough.  He has a history of asthma and reports occasional wheezing.  She has not had to use her albuterol inhaler in the last couple days.  She recently had a couple visits to the ER for evaluation of chest pain.  She still has some mild mid sternal chest pain.  Denies any worsening.  Dizziness.  Denies any fever, chills, sharp chest pain, vomiting, diarrhea.         Medications:    • albuterol Aers  • BENADRYL PO  • Corlanor Tabs  • docusate sodium Caps  • ibuprofen Tabs  • ipratropium-albuterol  • ivabradine Tabs  • lactulose Soln  • Melatonin Tabs  • ondansetron Tbdp  • traMADol Tabs  • traZODone Tabs  • ziprasidone Caps    Allergies: Depakote [divalproex sodium], Doxycycline, Montelukast [singulair], Vancomycin, Amitriptyline, Aripiprazole [abilify], Clindamycin, Flomax [tamsulosin hydrochloride], Metformin, Tamsulosin, Tape, Wound dressing adhesive, Ampicillin, Ciprofloxacin, Keflex, Levofloxacin, Metronidazole, Penicillins, Sulfa drugs, and Valproic acid    Problem List: Kristin Balderrama does not have any pertinent problems on file.    Surgical History:  Past Surgical History:   Procedure Laterality Date   • [place] LAP,DIAGNOSTIC ABDOMEN  2/14/2022    Procedure: LAPAROSCOPY;  Surgeon: Seamus Pisano M.D.;  Location: SURGERY SAME DAY [facility];  Service: Gynecology   • OVARIAN CYSTECTOMY Right 2/14/2022    Procedure: EXCISION, CYST, OVARY;  Surgeon: Seamus Pisano M.D.;  Location: SURGERY SAME DAY [facility];  Service: Gynecology   • BOWEL STIMULATOR INSERTION  3/10/2021     Procedure: INSERTION, ELECTRODE LEADS AND PULSE GENERATOR, NEUROSTIMULATOR, SACRAL - REMOVAL OF INTERSTIM WITH REPLACEMENT OF SACRAL NEUROMODULATION DEVICE;  Surgeon: Joe Noyola M.D.;  Location: SURGERY [facility];  Service: General   • MUSCLE BIOPSY Right 1/26/2017    Procedure: MUSCLE BIOPSY - THIGH;  Surgeon: Isidro Vigil M.D.;  Location: SURGERY [facility];  Service:    • GASTROSCOPY WITH BALLOON DILATATION N/A 1/4/2017    Procedure: GASTROSCOPY WITH DILATATION;  Surgeon: Torres Vargas M.D.;  Location: [facility];  Service:    • BOWEL STIMULATOR INSERTION  7/13/2016    Procedure: BOWEL STIMULATOR INSERTION FOR PERMANENT INTERSTIM SACRAL IMPLANT;  Surgeon: Joe Noyola M.D.;  Location: SURGERY [facility];  Service:    • RECOVERY  1/27/2016    Procedure: CATH LAB EP STUDY WITH SINUS NODE MODIFICATION LA;  Surgeon: [facility];  [place] [facility]  Service:    • OTHER CARDIAC SURGERY  1/2016    cardiac ablation   • NEURO DEST FACET L/S W/IG SNGL  4/21/2015    Performed by Reza Tabor at [facility]   • LUMBAR FUSION ANTERIOR  8/21/2012    Performed by SUSIE SAWANT at [facility]   • ALYSSA BY LAPAROSCOPY  8/29/2010    Performed by SHAYY JOHNS at [facility]   • LAMINOTOMY     • OTHER ABDOMINAL SURGERY     • TONSILLECTOMY      tonsillectomy       Past Social Hx: Kristin Balderrama  reports that she has never smoked. She has never used smokeless tobacco. She reports previous drug use. Frequency: 7.00 times per week. Drug: Marijuana. She reports that she does not drink alcohol.     Past Family Hx:  Kristin Balderrama family history includes Genitourinary () Problems in her sister; Heart Disease in her maternal grandmother and mother; Hypertension in her maternal grandmother, maternal uncle, and mother; No Known Problems in her sister; Other in her mother and sister; Sleep Apnea in her mother.  "    Problem list, medications, and allergies reviewed by myself today in Epic.     Objective:     /78 (BP Location: Right arm, Patient Position: Sitting, BP Cuff Size: Adult long)   Pulse (!) 104   Temp 36.1 °C (97 °F) (Temporal)   Resp 16   Ht 1.6 m (5' 3\")   Wt 108 kg (237 lb 9.6 oz)   SpO2 95%   BMI 42.09 kg/m²     Physical Exam  Vitals reviewed.   Constitutional:       General: She is not in acute distress.     Appearance: Normal appearance. She is not ill-appearing or toxic-appearing.   HENT:      Right Ear: Tympanic membrane and ear canal normal.      Left Ear: Ear canal normal.      Nose: Nose normal.      Mouth/Throat:      Mouth: Mucous membranes are moist.      Pharynx: Oropharynx is clear. No oropharyngeal exudate or posterior oropharyngeal erythema.   Eyes:      Conjunctiva/sclera: Conjunctivae normal.      Pupils: Pupils are equal, round, and reactive to light.   Cardiovascular:      Rate and Rhythm: Normal rate and regular rhythm.      Heart sounds: Normal heart sounds.   Pulmonary:      Effort: Pulmonary effort is normal. No respiratory distress.      Breath sounds: Normal breath sounds. No wheezing, rhonchi or rales.   Musculoskeletal:      Cervical back: Neck supple.   Lymphadenopathy:      Cervical: No cervical adenopathy.   Skin:     General: Skin is warm and dry.   Neurological:      General: No focal deficit present.      Mental Status: She is alert and oriented to person, place, and time.   Psychiatric:         Mood and Affect: Mood normal.         Behavior: Behavior normal.         Diagnosis and associated orders:     1. Right ear pain    2. Viral URI with cough       Comments/MDM:     No signs of otitis media.  This must be clearing up from azithromycin.  Continue azithromycin.The patient's presenting symptoms and exam findings are consistent with a upper respiratory infection most likely viral etiology. They have a normal pulse oximetry on room air, afebrile, and a normal " pulmonary exam. Overall, the patient is very well appearing. I do not feel that this patient would benefit from antibiotics at this time.   Recommended symptomatic and supportive care at this time that includes plenty of fluids, rest, Tylenol/Ibuprofen for pain/fever, warm salt water gargles for sore throat, OTC cough and decongestant medication, Flonase, nasal saline washes.        I personally reviewed prior external notes and test results pertinent to today's visit. Red flags discussed and indications to present to the Emergency Department. Supportive care, natural history, differential diagnoses, and indications for immediate follow-up discussed. Patient expresses understanding and agrees to plan. Patient denies any other questions or concerns.     Follow-up with the primary care physician for recheck, reevaluation, and consideration of further management.    Please note that this dictation was created using voice recognition software. I have made a reasonable attempt to correct obvious errors, but I expect that there are errors of grammar and possibly content that I did not discover before finalizing the note.    This note was electronically signed by Jose Suazo PA-C   English

## 2022-04-19 ENCOUNTER — TELEPHONE (OUTPATIENT)
Dept: CARDIOLOGY | Facility: MEDICAL CENTER | Age: 32
End: 2022-04-19
Payer: MEDICARE

## 2022-04-19 DIAGNOSIS — I49.3 PVC (PREMATURE VENTRICULAR CONTRACTION): ICD-10-CM

## 2022-04-19 DIAGNOSIS — Z98.890 H/O PRIOR ABLATION TREATMENT: ICD-10-CM

## 2022-04-19 DIAGNOSIS — I48.0 PAF (PAROXYSMAL ATRIAL FIBRILLATION) (HCC): ICD-10-CM

## 2022-04-19 DIAGNOSIS — Z79.899 HIGH RISK MEDICATION USE: ICD-10-CM

## 2022-04-19 DIAGNOSIS — I47.11 INAPPROPRIATE SINUS TACHYCARDIA (HCC): ICD-10-CM

## 2022-04-19 DIAGNOSIS — Z86.79 HISTORY OF SUPRAVENTRICULAR TACHYCARDIA: ICD-10-CM

## 2022-04-20 PROCEDURE — 93224 XTRNL ECG REC UP TO 48 HRS: CPT | Performed by: INTERNAL MEDICINE

## 2022-05-13 ENCOUNTER — OFFICE VISIT (OUTPATIENT)
Dept: CARDIOLOGY | Facility: MEDICAL CENTER | Age: 32
End: 2022-05-13
Payer: MEDICARE

## 2022-05-13 VITALS
SYSTOLIC BLOOD PRESSURE: 120 MMHG | DIASTOLIC BLOOD PRESSURE: 86 MMHG | RESPIRATION RATE: 18 BRPM | HEIGHT: 63 IN | HEART RATE: 99 BPM | WEIGHT: 241.4 LBS | OXYGEN SATURATION: 96 % | BODY MASS INDEX: 42.77 KG/M2

## 2022-05-13 DIAGNOSIS — R07.2 CHEST PAIN, PRECORDIAL: ICD-10-CM

## 2022-05-13 DIAGNOSIS — I47.11 INAPPROPRIATE SINUS TACHYCARDIA (HCC): ICD-10-CM

## 2022-05-13 DIAGNOSIS — R00.2 PALPITATIONS: ICD-10-CM

## 2022-05-13 PROCEDURE — 99214 OFFICE O/P EST MOD 30 MIN: CPT | Performed by: INTERNAL MEDICINE

## 2022-05-13 RX ORDER — CEPHALEXIN 500 MG/1
500 CAPSULE ORAL 2 TIMES DAILY
COMMUNITY
End: 2022-06-29

## 2022-05-13 ASSESSMENT — ENCOUNTER SYMPTOMS
DIZZINESS: 0
MYALGIAS: 0
PALPITATIONS: 1
COUGH: 0
SHORTNESS OF BREATH: 0
LOSS OF CONSCIOUSNESS: 0

## 2022-05-13 ASSESSMENT — FIBROSIS 4 INDEX: FIB4 SCORE: 0.84

## 2022-05-13 NOTE — PROGRESS NOTES
"Chief Complaint   Patient presents with   • Supraventricular Tachycardia (SVT)       Subjective     Kristin Balderrama is a 31 y.o. female who presents today for sinus tachycardia, ablation 2016 for sinus node modification for IST (Inappropriate Sinus Tachycardia) on chronic Corlanor therapy.    Since 4/1/2022 the patient has had no specific cardiac problems.  She was seen in the ER in 3/2022 for chest pain symptoms, also seen at [facility] ER on 2/17/2022 for chest pain shortness of breath with negative chest CTA, normal EKG.  There has been mention that the patient had \"atrial fibrillation\" however there is been no documentation of that.  His random intermittent chest pain.  Rare palpitation.  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      Past Medical History:   Diagnosis Date   • Abdominal pain    • Anginal syndrome     random chest pain especially with tachycardia   • Apnea, sleep    • Arrhythmia     \"sinus tachycardia\", cariologist, Dr. Kumar; ablation 2/2016   • Arthritis     osteo   • ASTHMA     when around smoke   • Back pain    • Borderline personality disorder (HCC)    • Breath shortness     with tachycardia   • Bronchitis 02/08/2022    Last time was 12/21   • Cardiac arrhythmia    • Chickenpox    • Chronic UTI 09/18/2010   • Cough    • Daytime sleepiness    • Dental disorder 03/08/2021    infected tooth   • Depression    • Diabetes (HCC)    • Diarrhea     incontinece   • Disorder of thyroid     Hashimoto's   • Fall    • Fatigue    • " "Frequent headaches    • Gasping for breath    • Gynecological disorder     PCOS   • Headache(784.0)    • Heart burn    • Heart murmur    • History of falling    • Indigestion    • Migraine    • Mitochondrial disease (HCC)    • 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HCC)    • Nausea    • Obesity    • Other fatigue 06/29/2020   • Pain 08/15/2012    back, 7/10   • Painful joint    • PCOS (polycystic ovarian syndrome)    • Pneumonia 2012 12/2020   • Psychosis (HCC)    • Ringing in ears    • Scoliosis    • Shortness of breath     O2 as needed   • Sinus tachycardia 10/31/2013   • Sleep apnea     CPAP \"pulmonary doctor took me off mid year 2016\"   • Snoring    • Tonsillitis    • 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HCC)     2/8/22: Per pt: not anymore   • Tuberculosis     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   • Urinary bladder disorder     Suprapubic cath. 2/8/22: Not anymore.    • Urinary incontinence    • Weakness    • Wears glasses      Past Surgical History:   Procedure Laterality Date   • [place] LAP,DIAGNOSTIC ABDOMEN  2/14/2022    Procedure: LAPAROSCOPY;  Surgeon: Seamus Pisano M.D.;  Location: SURGERY SAME DAY [facility];  Service: Gynecology   • OVARIAN CYSTECTOMY Right 2/14/2022    Procedure: EXCISION, CYST, OVARY;  Surgeon: Seamus Pisano M.D.;  Location: SURGERY SAME DAY [facility];  Service: Gynecology   • BOWEL STIMULATOR INSERTION  3/10/2021    Procedure: INSERTION, ELECTRODE LEADS AND PULSE GENERATOR, NEUROSTIMULATOR, SACRAL - REMOVAL OF INTERSTIM WITH REPLACEMENT OF SACRAL NEUROMODULATION DEVICE;  Surgeon: Joe Noyola M.D.;  Location: [facility];  Service: General   • MUSCLE BIOPSY Right 1/26/2017    Procedure: MUSCLE BIOPSY - THIGH;  Surgeon: Isidro Vigil M.D.;  Location: [facility];  Service:    • GASTROSCOPY WITH BALLOON DILATATION N/A 1/4/2017    Procedure: GASTROSCOPY WITH DILATATION;  Surgeon: Torres Vargas M.D.;  Location: [facility];  Service:    • BOWEL STIMULATOR INSERTION  " 7/13/2016    Procedure: BOWEL STIMULATOR INSERTION FOR PERMANENT INTERSTIM SACRAL IMPLANT;  Surgeon: Joe Noyola M.D.;  Location: SURGERY [facility];  Service:    • RECOVERY  1/27/2016    Procedure: CATH LAB EP STUDY WITH SINUS NODE MODIFICATION ABHINAV;  Surgeon: Recoveryonly Surgery;  Location: SURGERY PRE-POST PROC UNIT [facility];  Service:    • OTHER CARDIAC SURGERY  1/2016    cardiac ablation   • NEURO DEST FACET L/S W/IG SNGL  4/21/2015    Performed by Reza Tabor at SUR[facility]ERY [facility]   • LUMBAR FUSION ANTERIOR  8/21/2012    Performed by SUSIE SAWANT at SURGERY [facility]   • ALYSSA BY LAPAROSCOPY  8/29/2010    Performed by SHAYY JOHNS at SURGERY [facility]   • LAMINOTOMY     • OTHER ABDOMINAL SURGERY     • TONSILLECTOMY      tonsillectomy     Family History   Problem Relation Age of Onset   • Hypertension Mother    • Sleep Apnea Mother    • Heart Disease Mother    • Other Mother         hypothryod   • Hypertension Maternal Uncle    • Heart Disease Maternal Grandmother    • Hypertension Maternal Grandmother    • No Known Problems Sister    • Other Sister         Narcolepsy;fibromyalsia;bone;nerve   • Genitourinary () Problems Sister         endometriosis     Social History     Socioeconomic History   • Marital status: Single     Spouse name: Not on file   • Number of children: Not on file   • Years of education: Not on file   • Highest education level: Not on file   Occupational History   • Not on file   Tobacco Use   • Smoking status: Never Smoker   • Smokeless tobacco: Never Used   Vaping Use   • Vaping Use: Never used   Substance and Sexual Activity   • Alcohol use: No     Alcohol/week: 0.0 oz   • Drug use: Not Currently     Frequency: 7.0 times per week     Types: Marijuana   • Sexual activity: Not Currently     Birth control/protection: Implant   Other Topics Concern   • Not on file   Social History Narrative    ** Merged History Encounter **          Social Determinants of  "Health     Financial Resource Strain: Not on file   Food Insecurity: Not on file   Transportation Needs: Not on file   Physical Activity: Not on file   Stress: Not on file   Social Connections: Not on file   Intimate Partner Violence: Not on file   Housing Stability: Not on file     Allergies   Allergen Reactions   • Depakote [Divalproex Sodium] Unspecified     Muscle spasms/muscle pain and weakness     • Doxycycline Anaphylaxis and Vomiting     Other reaction(s): pustules/blisters   • Montelukast [Singulair] Unspecified     Cardiac effusion   • Vancomycin Itching     Pt becomes flushed in face and chest.   RXN=7/10/16   • Amitriptyline Unspecified     Headaches     • Aripiprazole [Abilify] Unspecified     Headaches/muscle twitching     • Clindamycin Nausea          • Flomax [Tamsulosin Hydrochloride] Swelling   • Metformin Unspecified     Increased lactic acid     Other reaction(s): itching and rash/nausea vomiting   • Tamsulosin Swelling     Swelling of legs   • Tape Rash     Tears skin off  coban with Tegaderm tape ok intermittently  RXN=ongoing   • Wound Dressing Adhesive Hives     By pt report   • Ampicillin Rash     Pt reports that she received a rash    • Ciprofloxacin Rash         • Keflex Rash     Pt states she gets a rash with this medication  Tolerates ceftriaxone  Can take with Benadryl   • Levofloxacin Unspecified     Leg muscle cramps   • Metronidazole Rash     \"Vision problems\"   • Penicillins Hives     Can take with Benadryl   • Sulfa Drugs Itching and Myalgia     Muscle pain and weakness   • Valproic Acid Rash     .     Outpatient Encounter Medications as of 5/13/2022   Medication Sig Dispense Refill   • cephALEXin (KEFLEX) 500 MG Cap Take 500 mg by mouth in the morning and 500 mg in the evening.     • ondansetron (ZOFRAN ODT) 4 MG TABLET DISPERSIBLE Take 1 Tablet by mouth every 6 hours as needed. 10 Tablet 0   • ibuprofen (MOTRIN) 600 MG Tab Take 1 Tablet by mouth every 6 hours as needed. 30 Tablet " "1   • lactulose 20 GM/30ML Solution Take 30 mL by mouth 2 times a day. 473 mL 0   • docusate sodium (COLACE) 100 MG Cap Take 1 Capsule by mouth 2 times a day. 60 Capsule 0   • traZODone (DESYREL) 100 MG Tab Take 100 mg by mouth every evening.     • diphenhydrAMINE HCl (BENADRYL PO) Take 50 mg by mouth every evening. Night time     • ivabradine (CORLANOR) 5 MG Tab tablet Take 1 Tablet by mouth 2 times a day with meals. 60 Tablet 5   • ivabradine (CORLANOR) 7.5 MG Tab tablet Take 7.5 mg by mouth 1 time a day as needed.     • ziprasidone (GEODON) 40 MG Cap TAKE 1 CAPSULE BY MOUTH TWICE A DAY. APPOINTMENT REQUIRED FOR ADDITIONAL REFILLS.  CALL [phone]. . (Patient taking differently: Take 40 mg by mouth every evening.) 60 Capsule 1   • traMADol (ULTRAM) 50 MG Tab Take 50 mg by mouth 4 times a day.     • ipratropium-albuterol (DUONEB) 0.5-2.5 (3) MG/3ML nebulizer solution Take 3 mL by nebulization every four hours as needed for Shortness of Breath. Nebulizer 360 mL 2   • Melatonin 10 MG Tab Take 10 mg by mouth every bedtime.     • albuterol 108 (90 Base) MCG/ACT Aero Soln inhalation aerosol Inhale 2 Puffs every 6 hours as needed for Shortness of Breath. 8.5 g 6     No facility-administered encounter medications on file as of 2022.     Review of Systems   Respiratory: Negative for cough and shortness of breath.    Cardiovascular: Positive for chest pain and palpitations.   Musculoskeletal: Negative for myalgias.   Neurological: Negative for dizziness and loss of consciousness.              Objective     /86 (BP Location: Left arm, Patient Position: Sitting, BP Cuff Size: Adult)   Pulse 99   Resp 18   Ht 1.6 m (5' 3\")   Wt 109 kg (241 lb 6.4 oz)   SpO2 96%   BMI 42.76 kg/m²     Physical Exam  Constitutional:       General: She is not in acute distress.     Appearance: She is well-developed.      Comments: Ambulating with cane assistance   Eyes:      Conjunctiva/sclera: Conjunctivae normal. "      Pupils: Pupils are equal, round, and reactive to light.   Neck:      Vascular: No JVD.   Cardiovascular:      Rate and Rhythm: Normal rate and regular rhythm.      Heart sounds: Normal heart sounds.   Pulmonary:      Effort: Pulmonary effort is normal. No accessory muscle usage or respiratory distress.      Breath sounds: Normal breath sounds. No wheezing or rales.   Musculoskeletal:      Cervical back: Normal range of motion and neck supple.      Right lower leg: Edema present.      Left lower leg: No edema.      Comments: Hyperflexibility of fingers   Skin:     General: Skin is warm and dry.      Findings: No rash.      Nails: There is no clubbing.   Neurological:      Mental Status: She is alert and oriented to person, place, and time.   Psychiatric:         Behavior: Behavior normal.               MPI 4/6/2017  Baseline ECG:NSR, diffuse T wave flattening throughout  Stress ECG: No ischemic T wave changes with peak stress  Impression: Normal stress ECG, nuclear images pending   No myocardial infarct or inducible ischemia.   Normal LEFT ventricular function.    03/01/2017 ECHOCARDIOGRAM  Left ventricular ejection fraction 60%.  No valvular disease.     CHEST CTA 2/17/2022  1.  No evidence of pulmonary embolism.  2.  Trace bilateral pleural effusions.  3.  Clear lungs without consolidation.  4.  Ascending aorta normal caliber no dissection.    Cardiac event recorder 8/31/2020  ZioPatch Summary:   1. Sinus rhythm with appropriate heart rate range. Average 73, range 52 to 117 bpm.   2. Normal sinus node and AV node conduction normal. No pauses.   3. Rare PACs.   4. Rare PVCs.   5. No sustained rhythm changes. No atrial fibrillation/flutter.   6. 5 patient triggers, symptoms reported include fluttering and racing, shaky during sinus, 73 to 90 bpm..   Conclusion: Normal monitor.  Sinus rhythm with rare PVCs and PACs.  Symptoms during sinus rhythm.   Lexy Solomon MD [facility]      08/13/2018 EKG: Normal sinus rhythm,  rate 83.  Reviewed by myself.     EKG 4/1/2022 sinus rhythm, rate 80, personally reviewed.    Assessment & Plan     1. Inappropriate sinus tachycardia     2. Palpitations     3. Chest pain, precordial         Medical Decision Making: Today's Assessment/Status/Plan:      Assessment  1.  Inappropriate sinus tachycardia.  2.  Ablation for IST, 2016.  3.  Thyroid disorder, on chronic supplementation.  4.  Sleep apnea on CPAP.  5.  Morbid obesity.  6.  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  Chest pain probably noncardiac    Recommendation Discussion  1.  IST: Clinically stable, continue Corlanor.  2.  Chest pain: Monitor, previous negative  3.  Palpitations: Continue to monitor, reviewed all previous EKGs showing no evidence of atrial fibrillation.  4.?  EDS: Await genetic testing, may require additional vascular surveillance, recent CTA showed no evidence of thoracic aortic involvement

## 2022-06-03 ENCOUNTER — OFFICE VISIT (OUTPATIENT)
Dept: URGENT CARE | Facility: CLINIC | Age: 32
End: 2022-06-03
Payer: MEDICARE

## 2022-06-03 VITALS
HEART RATE: 123 BPM | DIASTOLIC BLOOD PRESSURE: 68 MMHG | BODY MASS INDEX: 41.99 KG/M2 | SYSTOLIC BLOOD PRESSURE: 114 MMHG | HEIGHT: 63 IN | TEMPERATURE: 97.6 F | WEIGHT: 237 LBS | RESPIRATION RATE: 16 BRPM | OXYGEN SATURATION: 97 %

## 2022-06-03 DIAGNOSIS — R21 RASH AND NONSPECIFIC SKIN ERUPTION: ICD-10-CM

## 2022-06-03 PROCEDURE — 99213 OFFICE O/P EST LOW 20 MIN: CPT | Performed by: NURSE PRACTITIONER

## 2022-06-03 ASSESSMENT — FIBROSIS 4 INDEX: FIB4 SCORE: 0.84

## 2022-06-04 ASSESSMENT — ENCOUNTER SYMPTOMS
SHORTNESS OF BREATH: 0
ROS SKIN COMMENTS: BURNING
EYE PAIN: 0
CHILLS: 0
MYALGIAS: 0
DIZZINESS: 0
FEVER: 0
NAUSEA: 0
SORE THROAT: 0
VOMITING: 0

## 2022-06-04 NOTE — PROGRESS NOTES
Subjective:   Kristin Balderrama is a 31 y.o. female who presents for Rash (Pt has a painful rash on both legs x morning )      HPI  Patient is a 31-year-old female presents to urgent care for evaluation of a painful rash on her bilateral lower extremities that started this morning.  Patient states that she shaved her legs today with a goal and a new razor using shaving cream.  Patient did note discomfort when she was shaving however rash was severely worse throughout the day which prompted her follow-up.  Patient attempted to wash her legs after shaving however had no relief.  She denies any fever or chills.  Review of Systems   Constitutional: Negative for chills and fever.   HENT: Negative for sore throat.    Eyes: Negative for pain.   Respiratory: Negative for shortness of breath.    Cardiovascular: Negative for chest pain.   Gastrointestinal: Negative for nausea and vomiting.   Genitourinary: Negative for hematuria.   Musculoskeletal: Negative for myalgias.   Skin: Positive for rash.        Burning     Neurological: Negative for dizziness.       Medications:    • albuterol Aers  • BENADRYL PO  • cephALEXin Caps  • Corlanor Tabs  • docusate sodium Caps  • ibuprofen Tabs  • ipratropium-albuterol  • ivabradine Tabs  • lactulose Soln  • Melatonin Tabs  • ondansetron Tbdp  • traMADol Tabs  • traZODone Tabs  • ziprasidone Caps    Allergies: Depakote [divalproex sodium], Doxycycline, Montelukast [singulair], Vancomycin, Amitriptyline, Aripiprazole [abilify], Clindamycin, Flomax [tamsulosin hydrochloride], Metformin, Tamsulosin, Tape, Wound dressing adhesive, Ampicillin, Ciprofloxacin, Keflex, Levofloxacin, Metronidazole, Penicillins, Sulfa drugs, and Valproic acid    Problem List: Kristin Balderrama does not have any pertinent problems on file.    Surgical History:  Past Surgical History:   Procedure Laterality Date   • [place] LAP,DIAGNOSTIC ABDOMEN  2/14/2022    Procedure: LAPAROSCOPY;  Surgeon: Seamus Pisano,  M.D.;  Location: SURGERY SAME DAY [facility];  Service: Gynecology   • OVARIAN CYSTECTOMY Right 2/14/2022    Procedure: EXCISION, CYST, OVARY;  Surgeon: Seamus Pisano M.D.;  Location: SURGERY SAME DAY [facility];  Service: Gynecology   • BOWEL STIMULATOR INSERTION  3/10/2021    Procedure: INSERTION, ELECTRODE LEADS AND PULSE GENERATOR, NEUROSTIMULATOR, SACRAL - REMOVAL OF INTERSTIM WITH REPLACEMENT OF SACRAL NEUROMODULATION DEVICE;  Surgeon: Joe Noyola M.D.;  Location: SURGERY [facility];  Service: General   • MUSCLE BIOPSY Right 1/26/2017    Procedure: MUSCLE BIOPSY - THIGH;  Surgeon: Isidro Vigil M.D.;  Location: SURGERY [facility];  Service:    • GASTROSCOPY WITH BALLOON DILATATION N/A 1/4/2017    Procedure: GASTROSCOPY WITH DILATATION;  Surgeon: Torres Vargas M.D.;  Location: [facility];  Service:    • BOWEL STIMULATOR INSERTION  7/13/2016    Procedure: BOWEL STIMULATOR INSERTION FOR PERMANENT INTERSTIM SACRAL IMPLANT;  Surgeon: Joe Noyola M.D.;  Location: SURGERY [facility];  Service:    • RECOVERY  1/27/2016    Procedure: CATH LAB EP STUDY WITH SINUS NODE MODIFICATION LA;  Surgeon: Recoveryonly Surgery;  Location: SURGERY PRE-POST PROC UNIT [facility];  Service:    • OTHER CARDIAC SURGERY  1/2016    cardiac ablation   • NEURO DEST FACET L/S W/IG SNGL  4/21/2015    Performed by Reza Tabor at [facility]   • LUMBAR FUSION ANTERIOR  8/21/2012    Performed by SUSIE SAWANT at [facility]   • ALYSSA BY LAPAROSCOPY  8/29/2010    Performed by SHAYY JOHNS at [facility]   • LAMINOTOMY     • OTHER ABDOMINAL SURGERY     • TONSILLECTOMY      tonsillectomy       Past Social Hx: Kristin Balderrama  reports that she has never smoked. She has never used smokeless tobacco. She reports previous drug use. Frequency: 7.00 times per week. Drug: Marijuana. She reports that she does not drink alcohol.     Past Family Hx:  Kristin Balderrama family  "history includes Genitourinary () Problems in her sister; Heart Disease in her maternal grandmother and mother; Hypertension in her maternal grandmother, maternal uncle, and mother; No Known Problems in her sister; Other in her mother and sister; Sleep Apnea in her mother.     Problem list, medications, and allergies reviewed by myself today in Epic.     Objective:     /68 (BP Location: Left arm, Patient Position: Sitting, BP Cuff Size: Large adult)   Pulse (!) 123   Temp 36.4 °C (97.6 °F) (Temporal)   Resp 16   Ht 1.6 m (5' 3\")   Wt 108 kg (237 lb)   SpO2 97%   BMI 41.98 kg/m²     Physical Exam  Constitutional:       Appearance: Normal appearance. She is not ill-appearing or toxic-appearing.   HENT:      Head: Normocephalic.      Right Ear: External ear normal.      Left Ear: External ear normal.      Nose: Nose normal.      Mouth/Throat:      Lips: Pink.      Mouth: Mucous membranes are moist.   Eyes:      General: Lids are normal.         Right eye: No discharge.         Left eye: No discharge.   Pulmonary:      Effort: Pulmonary effort is normal. No accessory muscle usage or respiratory distress.   Musculoskeletal:         General: Normal range of motion.      Cervical back: Full passive range of motion without pain.   Skin:     Coloration: Skin is not pale.      Findings: Erythema and rash present. No burn.          Neurological:      Mental Status: She is alert and oriented to person, place, and time.   Psychiatric:         Mood and Affect: Mood normal.         Thought Content: Thought content normal.         Assessment/Plan:     Diagnosis and associated orders:     1. Rash and nonspecific skin eruption          Comments/MDM:     • Patient is a 31-year-old female present with the stated above, on exam patient has what appears to be a razor burn due to shaving.  No signs of infection on today's exam.  Recommended Tylenol ibuprofen as needed for pain, may use cooling gel over-the-counter for pain " relief.Differential diagnosis, natural history, supportive care, and indications for immediate follow-up discussed.              My total time spent caring for the patient on the day of the encounter was 20 minutes.   This does not include time spent on separately billable procedures/tests.    Please note that this dictation was created using voice recognition software. I have made a reasonable attempt to correct obvious errors, but I expect that there are errors of grammar and possibly content that I did not discover before finalizing the note.    This note was electronically signed by Felice PUGA.

## 2022-06-21 ENCOUNTER — APPOINTMENT (OUTPATIENT)
Dept: RADIOLOGY | Facility: MEDICAL CENTER | Age: 32
End: 2022-06-21
Attending: EMERGENCY MEDICINE
Payer: MEDICARE

## 2022-06-21 ENCOUNTER — HOSPITAL ENCOUNTER (EMERGENCY)
Facility: MEDICAL CENTER | Age: 32
End: 2022-06-21
Attending: EMERGENCY MEDICINE
Payer: MEDICARE

## 2022-06-21 VITALS
DIASTOLIC BLOOD PRESSURE: 64 MMHG | OXYGEN SATURATION: 96 % | BODY MASS INDEX: 42.81 KG/M2 | WEIGHT: 241.62 LBS | TEMPERATURE: 98.2 F | SYSTOLIC BLOOD PRESSURE: 141 MMHG | HEIGHT: 63 IN | RESPIRATION RATE: 20 BRPM | HEART RATE: 83 BPM

## 2022-06-21 DIAGNOSIS — R51.9 ACUTE NONINTRACTABLE HEADACHE, UNSPECIFIED HEADACHE TYPE: ICD-10-CM

## 2022-06-21 DIAGNOSIS — G93.2 IIH (IDIOPATHIC INTRACRANIAL HYPERTENSION): ICD-10-CM

## 2022-06-21 LAB
ALBUMIN SERPL BCP-MCNC: 4.7 G/DL (ref 3.2–4.9)
ALBUMIN/GLOB SERPL: 2.1 G/DL
ALP SERPL-CCNC: 87 U/L (ref 30–99)
ALT SERPL-CCNC: 24 U/L (ref 2–50)
ANION GAP SERPL CALC-SCNC: 13 MMOL/L (ref 7–16)
AST SERPL-CCNC: 16 U/L (ref 12–45)
BASOPHILS # BLD AUTO: 0.6 % (ref 0–1.8)
BASOPHILS # BLD: 0.04 K/UL (ref 0–0.12)
BILIRUB SERPL-MCNC: 0.6 MG/DL (ref 0.1–1.5)
BUN SERPL-MCNC: 10 MG/DL (ref 8–22)
CALCIUM SERPL-MCNC: 9.5 MG/DL (ref 8.5–10.5)
CHLORIDE SERPL-SCNC: 107 MMOL/L (ref 96–112)
CO2 SERPL-SCNC: 19 MMOL/L (ref 20–33)
CREAT SERPL-MCNC: 0.62 MG/DL (ref 0.5–1.4)
EOSINOPHIL # BLD AUTO: 0.13 K/UL (ref 0–0.51)
EOSINOPHIL NFR BLD: 1.9 % (ref 0–6.9)
ERYTHROCYTE [DISTWIDTH] IN BLOOD BY AUTOMATED COUNT: 40.6 FL (ref 35.9–50)
GFR SERPLBLD CREATININE-BSD FMLA CKD-EPI: 121 ML/MIN/1.73 M 2
GLOBULIN SER CALC-MCNC: 2.2 G/DL (ref 1.9–3.5)
GLUCOSE SERPL-MCNC: 85 MG/DL (ref 65–99)
HCT VFR BLD AUTO: 39.4 % (ref 37–47)
HGB BLD-MCNC: 13.7 G/DL (ref 12–16)
IMM GRANULOCYTES # BLD AUTO: 0.03 K/UL (ref 0–0.11)
IMM GRANULOCYTES NFR BLD AUTO: 0.4 % (ref 0–0.9)
LYMPHOCYTES # BLD AUTO: 1.93 K/UL (ref 1–4.8)
LYMPHOCYTES NFR BLD: 28.4 % (ref 22–41)
MCH RBC QN AUTO: 31 PG (ref 27–33)
MCHC RBC AUTO-ENTMCNC: 34.8 G/DL (ref 33.6–35)
MCV RBC AUTO: 89.1 FL (ref 81.4–97.8)
MONOCYTES # BLD AUTO: 0.45 K/UL (ref 0–0.85)
MONOCYTES NFR BLD AUTO: 6.6 % (ref 0–13.4)
NEUTROPHILS # BLD AUTO: 4.22 K/UL (ref 2–7.15)
NEUTROPHILS NFR BLD: 62.1 % (ref 44–72)
NRBC # BLD AUTO: 0 K/UL
NRBC BLD-RTO: 0 /100 WBC
PLATELET # BLD AUTO: 190 K/UL (ref 164–446)
PMV BLD AUTO: 11.3 FL (ref 9–12.9)
POTASSIUM SERPL-SCNC: 4 MMOL/L (ref 3.6–5.5)
PROT SERPL-MCNC: 6.9 G/DL (ref 6–8.2)
RBC # BLD AUTO: 4.42 M/UL (ref 4.2–5.4)
SODIUM SERPL-SCNC: 139 MMOL/L (ref 135–145)
WBC # BLD AUTO: 6.8 K/UL (ref 4.8–10.8)

## 2022-06-21 PROCEDURE — 700111 HCHG RX REV CODE 636 W/ 250 OVERRIDE (IP): Performed by: EMERGENCY MEDICINE

## 2022-06-21 PROCEDURE — A9270 NON-COVERED ITEM OR SERVICE: HCPCS | Performed by: EMERGENCY MEDICINE

## 2022-06-21 PROCEDURE — 70450 CT HEAD/BRAIN W/O DYE: CPT | Mod: ME

## 2022-06-21 PROCEDURE — 80053 COMPREHEN METABOLIC PANEL: CPT

## 2022-06-21 PROCEDURE — 36415 COLL VENOUS BLD VENIPUNCTURE: CPT

## 2022-06-21 PROCEDURE — 99284 EMERGENCY DEPT VISIT MOD MDM: CPT

## 2022-06-21 PROCEDURE — 96375 TX/PRO/DX INJ NEW DRUG ADDON: CPT

## 2022-06-21 PROCEDURE — 700102 HCHG RX REV CODE 250 W/ 637 OVERRIDE(OP): Performed by: EMERGENCY MEDICINE

## 2022-06-21 PROCEDURE — 96374 THER/PROPH/DIAG INJ IV PUSH: CPT

## 2022-06-21 PROCEDURE — 85025 COMPLETE CBC W/AUTO DIFF WBC: CPT

## 2022-06-21 RX ORDER — ACETAZOLAMIDE 250 MG/1
500 TABLET ORAL 2 TIMES DAILY
Qty: 56 TABLET | Refills: 0 | Status: SHIPPED | OUTPATIENT
Start: 2022-06-21 | End: 2022-07-05

## 2022-06-21 RX ORDER — METOCLOPRAMIDE HYDROCHLORIDE 5 MG/ML
10 INJECTION INTRAMUSCULAR; INTRAVENOUS ONCE
Status: COMPLETED | OUTPATIENT
Start: 2022-06-21 | End: 2022-06-21

## 2022-06-21 RX ORDER — ACETAZOLAMIDE 250 MG/1
500 TABLET ORAL ONCE
Status: COMPLETED | OUTPATIENT
Start: 2022-06-21 | End: 2022-06-21

## 2022-06-21 RX ORDER — MORPHINE SULFATE 4 MG/ML
4 INJECTION INTRAVENOUS ONCE
Status: COMPLETED | OUTPATIENT
Start: 2022-06-21 | End: 2022-06-21

## 2022-06-21 RX ORDER — DIPHENHYDRAMINE HYDROCHLORIDE 50 MG/ML
25 INJECTION INTRAMUSCULAR; INTRAVENOUS ONCE
Status: COMPLETED | OUTPATIENT
Start: 2022-06-21 | End: 2022-06-21

## 2022-06-21 RX ORDER — KETOROLAC TROMETHAMINE 30 MG/ML
15 INJECTION, SOLUTION INTRAMUSCULAR; INTRAVENOUS ONCE
Status: COMPLETED | OUTPATIENT
Start: 2022-06-21 | End: 2022-06-21

## 2022-06-21 RX ADMIN — MORPHINE SULFATE 4 MG: 4 INJECTION INTRAVENOUS at 20:09

## 2022-06-21 RX ADMIN — METOCLOPRAMIDE 10 MG: 5 INJECTION, SOLUTION INTRAMUSCULAR; INTRAVENOUS at 18:54

## 2022-06-21 RX ADMIN — KETOROLAC TROMETHAMINE 15 MG: 30 INJECTION, SOLUTION INTRAMUSCULAR; INTRAVENOUS at 18:53

## 2022-06-21 RX ADMIN — ACETAZOLAMIDE 500 MG: 250 TABLET ORAL at 21:03

## 2022-06-21 RX ADMIN — DIPHENHYDRAMINE HYDROCHLORIDE 25 MG: 50 INJECTION INTRAMUSCULAR; INTRAVENOUS at 18:54

## 2022-06-21 ASSESSMENT — FIBROSIS 4 INDEX: FIB4 SCORE: 0.84

## 2022-06-21 ASSESSMENT — PAIN DESCRIPTION - PAIN TYPE: TYPE: ACUTE PAIN

## 2022-06-21 NOTE — ED TRIAGE NOTES
"Patient vital signs rechecked and documented per [facility]. Patient denies any new needs at this time.  Patient updated on wait times, thanked for patience. Pt informed to alert triage tech or triage RN with any needs and/or changes in condition; patient verbalized understanding. Patient stated \"I feel more dizzy and my headache is worse.\"   "

## 2022-06-21 NOTE — ED NOTES
Pt placed on cardiac monitor, pulse ox, and automatic BP. VSS, saturating above 95% on RA, SR in the 80s. Call light within reach and pt updated on POC.

## 2022-06-21 NOTE — ED TRIAGE NOTES
"Chief Complaint   Patient presents with   • Headache     Patient states there is \"too much pressure in my brain - feels like bubble pressure.\" Also endorses nausea, \"nerve tingling,\" muscle weakness, eye pain, and \"pressure throbbing\" to neck and head. States lumbar punctures have been done in the past with no significant results. Denies visual changes.      Pt amb to triage with steady gait for above complaint.      Pt is alert and oriented, speaking in full sentences, follows commands and responds appropriately to questions. NAD. Resp are even and unlabored.     Pt placed in lobby. Pt educated on triage process. Pt encouraged to alert staff for any changes.    This RN masked and in appropriate PPE during encounter. Patient also in mask.     BP (!) 154/103   Pulse 90   Temp 35.9 °C (96.6 °F) (Temporal)   Resp 14   Ht 1.6 m (5' 3\")   Wt 110 kg (241 lb 10 oz)   SpO2 97%       " Z Plasty Text: The lesion was extirpated to the level of the fat with a #15 scalpel blade.  Given the location of the defect, shape of the defect and the proximity to free margins a Z-plasty was deemed most appropriate for repair.  Using a sterile surgical marker, the appropriate transposition arms of the Z-plasty were drawn incorporating the defect and placing the expected incisions within the relaxed skin tension lines where possible.    The area thus outlined was incised deep to adipose tissue with a #15 scalpel blade.  The skin margins were undermined to an appropriate distance in all directions utilizing iris scissors.  The opposing transposition arms were then transposed into place in opposite direction and anchored with interrupted buried subcutaneous sutures.

## 2022-06-22 ENCOUNTER — HOSPITAL ENCOUNTER (EMERGENCY)
Facility: MEDICAL CENTER | Age: 32
End: 2022-06-22
Attending: EMERGENCY MEDICINE
Payer: MEDICARE

## 2022-06-22 VITALS
DIASTOLIC BLOOD PRESSURE: 66 MMHG | OXYGEN SATURATION: 96 % | HEIGHT: 63 IN | SYSTOLIC BLOOD PRESSURE: 152 MMHG | HEART RATE: 79 BPM | RESPIRATION RATE: 18 BRPM | WEIGHT: 240.3 LBS | TEMPERATURE: 98.7 F | BODY MASS INDEX: 42.58 KG/M2

## 2022-06-22 DIAGNOSIS — G43.001 MIGRAINE WITHOUT AURA AND WITH STATUS MIGRAINOSUS, NOT INTRACTABLE: ICD-10-CM

## 2022-06-22 PROCEDURE — 96372 THER/PROPH/DIAG INJ SC/IM: CPT

## 2022-06-22 PROCEDURE — 700111 HCHG RX REV CODE 636 W/ 250 OVERRIDE (IP): Performed by: EMERGENCY MEDICINE

## 2022-06-22 PROCEDURE — 99283 EMERGENCY DEPT VISIT LOW MDM: CPT

## 2022-06-22 RX ORDER — HYDROMORPHONE HYDROCHLORIDE 1 MG/ML
1 INJECTION, SOLUTION INTRAMUSCULAR; INTRAVENOUS; SUBCUTANEOUS ONCE
Status: COMPLETED | OUTPATIENT
Start: 2022-06-22 | End: 2022-06-22

## 2022-06-22 RX ORDER — DIPHENHYDRAMINE HYDROCHLORIDE 50 MG/ML
50 INJECTION INTRAMUSCULAR; INTRAVENOUS ONCE
Status: COMPLETED | OUTPATIENT
Start: 2022-06-22 | End: 2022-06-22

## 2022-06-22 RX ORDER — PROCHLORPERAZINE EDISYLATE 5 MG/ML
10 INJECTION INTRAMUSCULAR; INTRAVENOUS ONCE
Status: COMPLETED | OUTPATIENT
Start: 2022-06-22 | End: 2022-06-22

## 2022-06-22 RX ADMIN — PROCHLORPERAZINE EDISYLATE 10 MG: 5 INJECTION INTRAMUSCULAR; INTRAVENOUS at 15:56

## 2022-06-22 RX ADMIN — HYDROMORPHONE HYDROCHLORIDE 1 MG: 1 INJECTION, SOLUTION INTRAMUSCULAR; INTRAVENOUS; SUBCUTANEOUS at 17:17

## 2022-06-22 RX ADMIN — DIPHENHYDRAMINE HYDROCHLORIDE 50 MG: 50 INJECTION INTRAMUSCULAR; INTRAVENOUS at 15:56

## 2022-06-22 ASSESSMENT — FIBROSIS 4 INDEX: FIB4 SCORE: 0.55

## 2022-06-22 NOTE — Clinical Note
Kristin Balderrama was seen and treated in our emergency department on 6/22/2022.  She may return to work on 06/25/2022.       If you have any questions or concerns, please don't hesitate to call.      Valente Castellon D.O.

## 2022-06-22 NOTE — ED PROVIDER NOTES
"ED Provider Note    Scribed for Phuong Calderon M.D. by Henrietta Pat. 6/21/2022  5:12 PM    Means of arrival: Walk in  History obtained from: patient   History limited by: none      CHIEF COMPLAINT  Chief Complaint   Patient presents with   • Headache     Patient states there is \"too much pressure in my brain - feels like bubble pressure.\" Also endorses nausea, \"nerve tingling,\" muscle weakness, eye pain, and \"pressure throbbing\" to neck and head. States lumbar punctures have been done in the past with no significant results. Denies visual changes.        HPI  Kristin Balderrama is a 31 y.o. female with multiple underlying medical problems including history of transverse myelitis and idiopathic intracranial hypertension who presents to the Emergency Department for headache onset around 12:40 today. Patient locates her headache to the back of her head and base of her back. She has associated intermittent blurred vision, tingling pains in her neck, nausea, and photophobia, but denies fevers or vomiting. Patient has a history of similar symptoms 1 1/2 years ago that she states have occurred when she has \"too much spinal fluid\". Patient has additional history of migraines, but states this is not a typical migraine. She is not currently on any medications and does not currently follow with a neurologist. Has taken acetazolamide in the past however not in some time as her symptoms resolved.    REVIEW OF SYSTEMS  Pertinent positive include headache, vision changes, tingling pain in neck, photophobia, and nasuea. Pertinent negative include fevers or vomiting. All other systems reviewed and are negative.      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    SOCIAL HISTORY  Social History     Tobacco Use   • Smoking status: Never Smoker   • Smokeless tobacco: Never Used   Vaping Use   • Vaping Use: Never used   Substance and Sexual Activity   • Alcohol use: No     Alcohol/week: 0.0 oz   • Drug use: Not Currently     Frequency: 7.0 times per week     Types: Marijuana   • Sexual activity: Not Currently     Birth control/protection: Implant       SURGICAL HISTORY   has a past surgical history that includes neuro dest facet l/s w/ig sngl (4/21/2015); recovery (1/27/2016); delmar by laparoscopy (8/29/2010); lumbar fusion anterior (8/21/2012); other cardiac surgery (1/2016); tonsillectomy; bowel stimulator insertion (7/13/2016); gastroscopy with balloon dilatation (N/A, 1/4/2017); muscle biopsy (Right, 1/26/2017); other abdominal surgery; laminotomy; bowel stimulator insertion (3/10/2021); lap,diagnostic abdomen (2/14/2022); and ovarian cystectomy (Right, 2/14/2022).    CURRENT MEDICATIONS  Home Medications     Reviewed by Ayaka Valentine R.N. (Registered Nurse) on 06/21/22 at 1318  Med List Status: Partial   Medication Last Dose Status   albuterol 108 (90 Base) MCG/ACT Aero Soln inhalation aerosol  Active   cephALEXin (KEFLEX) 500 MG Cap  Active   diphenhydrAMINE HCl (BENADRYL PO)   "Active   docusate sodium (COLACE) 100 MG Cap  Active   ibuprofen (MOTRIN) 600 MG Tab  Active   ipratropium-albuterol (DUONEB) 0.5-2.5 (3) MG/3ML nebulizer solution  Active   ivabradine (CORLANOR) 5 MG Tab tablet  Active   ivabradine (CORLANOR) 7.5 MG Tab tablet  Active   lactulose 20 GM/30ML Solution  Active   Melatonin 10 MG Tab  Active   ondansetron (ZOFRAN ODT) 4 MG TABLET DISPERSIBLE  Active   traMADol (ULTRAM) 50 MG Tab  Active   traZODone (DESYREL) 100 MG Tab  Active   ziprasidone (GEODON) 40 MG Cap  Active                ALLERGIES  Allergies   Allergen Reactions   • Depakote [Divalproex Sodium] Unspecified     Muscle spasms/muscle pain and weakness     • Doxycycline Anaphylaxis and Vomiting     Other reaction(s): pustules/blisters   • Montelukast [Singulair] Unspecified     Cardiac effusion   • Vancomycin Itching     Pt becomes flushed in face and chest.   RXN=7/10/16   • Amitriptyline Unspecified     Headaches     • Aripiprazole [Abilify] Unspecified     Headaches/muscle twitching     • Clindamycin Nausea          • Flomax [Tamsulosin Hydrochloride] Swelling   • Metformin Unspecified     Increased lactic acid     Other reaction(s): itching and rash/nausea vomiting   • Tamsulosin Swelling     Swelling of legs   • Tape Rash     Tears skin off  coban with Tegaderm tape ok intermittently  RXN=ongoing   • Wound Dressing Adhesive Hives     By pt report   • Ampicillin Rash     Pt reports that she received a rash    • Ciprofloxacin Rash         • Keflex Rash     Pt states she gets a rash with this medication  Tolerates ceftriaxone  Can take with Benadryl   • Levofloxacin Unspecified     Leg muscle cramps   • Metronidazole Rash     \"Vision problems\"   • Penicillins Hives     Can take with Benadryl   • Sulfa Drugs Itching and Myalgia     Muscle pain and weakness   • Valproic Acid Rash     .       PHYSICAL EXAM   VITAL SIGNS: BP (!) 142/93   Pulse 97   Temp 36.5 °C (97.7 °F) (Temporal)   Resp 18   Ht 1.6 m (5' 3\")  "  Wt 110 kg (241 lb 10 oz)   LMP 02/15/2022   SpO2 98%   BMI 42.80 kg/m²    Constitutional: Nontoxic appearing, alert in no apparent distress.  HENT: Normocephalic, Atraumatic. Bilateral external ears normal. Nose normal.  Moist mucous membranes.  Oropharynx clear.  Eyes: Pupils are equal and reactive. Conjunctiva normal. Patient reports intermittent diplopia on extraocular eye movements however none that is consistent.  Neck: Supple, full range of motion  Heart: Regular rate and rhythm.  No murmurs.    Lungs: No respiratory distress, normal work of breathing. Lungs clear to auscultation bilaterally.  Abdomen Soft, no distention.  No tenderness to palpation.  Musculoskeletal: Atraumatic. No obvious deformities noted.  No lower extremity edema.  Skin: Warm, Dry.  No erythema, No rash.   Neurologic: Alert and oriented x3. CN II-XII grossly intact.  Moving all extremities spontaneously without focal deficits. Mild weakness and diminished sensation in right lower extremity which is somewhat chronic.  Other extremities with normal strength and sensation.  No dysmetria, normal gait.  Psychiatric: Affect normal, Mood normal, Appears appropriate and not intoxicated.      DIAGNOSTIC STUDIES    LABS  Personally reviewed by me  Labs Reviewed   COMP METABOLIC PANEL - Abnormal; Notable for the following components:       Result Value    Co2 19 (*)     All other components within normal limits   CBC WITH DIFFERENTIAL   ESTIMATED GFR       RADIOLOGY  Personally reviewed by me  CT-HEAD W/O   Final Result      1.  No evidence of acute cerebral infarction, hemorrhage or mass lesion.   2.  There is a partially empty sella. This is a common incidental finding of no clinical significance however can be seen in the setting of idiopathic intracranial hypertension.           ED COURSE  Vitals:    06/21/22 1800 06/21/22 1921 06/21/22 2021 06/21/22 2049   BP: 131/81 (!) 141/89 (!) 141/64    Pulse: 81 80 83 83   Resp: 20 16 18 20   Temp:    36.8 °C (98.2 °F)    TempSrc:       SpO2: 96% 90% 91% 96%   Weight:       Height:             Medications administered:  Medications   metoclopramide (REGLAN) injection 10 mg (10 mg Intravenous Given 6/21/22 1854)   ketorolac (TORADOL) injection 15 mg (15 mg Intravenous Given 6/21/22 1853)   diphenhydrAMINE (BENADRYL) injection 25 mg (25 mg Intravenous Given 6/21/22 1854)   morphine 4 MG/ML injection 4 mg (4 mg Intravenous Given 6/21/22 2009)   acetaZOLAMIDE (DIAMOX) tablet 500 mg (500 mg Oral Given 6/21/22 2103)       5:12 PM Patient seen and examined at bedside. The patient presents with headache. I informed the patient that we do not typically preform lumbar punctures to treat her symptoms, however we can treat her with medication for her symptoms. Additionally, we will obtain labs and imaging. Ordered for CT head, CBC w/ diff, and CMP to evaluate. Patient will be treated with Reglan 10 mg, Toradol 15 mg, and benadryl 25 mg for her symptoms.       MEDICAL DECISION MAKING  Patient with history of idiopathic intracranial hypertension who has been off of treatment for sometime, now presenting with headache and vague neurologic symptoms that are similar to symptoms in the past with IIH.  She is nontoxic appearing with reassuring vitals on arrival.  Neuro exam with some mild worsening of chronic RLE weakness, no other obvious deficits.  Imaging without subarachnoid hemorrhage, may show nonspecific findings of IIH.  No concern for meningitis, encephalitis.  Labs are normal without leukocytosis or electrolyte abnormality.      Symptoms are likely due to recurrent IIH. As patient has known diagnosis, do not feet therapeutic lumbar puncture is necessary in the Emergency Department today.  Patient has needed fluoro guided LP in the past.  Will treat symptomatically for headache and start back on acetazolamide.  Outpatient neurology referral will be placed and patient can have LP scheduled as outpatient through PCP or  Neuro.      9:53 AM - Upon reassessment, patient is resting comfortably with stable vital signs.  No new complaints at this time. Headache is improved. Discussed results with patient and/or family as well as importance of primary care/neurology follow up.  Patient understands plan of care and strict return precautions for new or changing symptoms.     DISPOSITION:  Patient will be discharged home in stable condition.    FOLLOW UP:  Torres Brody M.D.  [address]  [address]  [place] [address]  [phone]    Schedule an appointment as soon as possible for a visit       [facility] - Neurology  [address], [address]  [facility] [id number] [phone]  Schedule an appointment as soon as possible for a visit       [facility], Emergency Dept  [address] [address]  [phone]    If symptoms worsen    [facility] NEUROLOGY  [address]  [address]  [place] [address]  [phone]            OUTPATIENT MEDICATIONS:  Discharge Medication List as of 6/21/2022  9:04 PM      START taking these medications    Details   acetaZOLAMIDE (DIAMOX) 250 MG Tab Take 2 Tablets by mouth 2 times a day for 14 days., Disp-56 Tablet, R-0, Normal               IMPRESSION  (R51.9) Acute nonintractable headache, unspecified headache type  (G93.2) IIH (idiopathic intracranial hypertension)    Results, diagnoses, and treatment options were discussed with the patient and/or family. Patient verbalized understanding of plan of care.       Henrietta RODRIGUEZ (Eva), am scribing for, and in the presence of, Phuong Calderon M.D..    Electronically signed by: Henrietta Pat (Eva), 6/21/2022    Phuong RODRIGUEZ M.D. personally performed the services described in this documentation, as scribed by Henrietta Pat in my presence, and it is both accurate and complete.    The note accurately reflects work and decisions made by me.  Phuong Calderon M.D.  6/22/2022  9:53 AM

## 2022-06-22 NOTE — DISCHARGE INSTRUCTIONS
You were seen in the Emergency Department for headache.    Labs, CT scan were completed without significant acute abnormalities.    Please use 1,000mg of tylenol or 600mg of ibuprofen every 6 hours as needed for pain.    Please follow up with your primary care physician and neurology as soon as possible.    Return to the Emergency Department with severe worsening headaches, new/worsening neurologic symptoms, or other concerns.

## 2022-06-22 NOTE — ED NOTES
1/26/2018        To Whom It May Concern:   Attn: Wendy Perez  Fax# [phone]     Cadence Kumar is a patient under my care and was seen in the clinic today (1/26/18) for a follow up visit.  It is medically necessary for the patient to continue with a 20 pound weight restriction at work for 2 weeks.       Please feel free to contact the office with any further questions.    Sincerely,        Evelia Riggins, [place] [id number] [phone]                   Pt medicated per mar for pain.

## 2022-06-22 NOTE — ED PROVIDER NOTES
"ED Provider Note    CHIEF COMPLAINT  Chief Complaint   Patient presents with   • Headache   • Lightheadedness       HPI  Kristin Balderrama is a 31 y.o. female here for evaluation of her typical headache and lightheadedness.  Patient states that she has had a very long history of headaches, and was recently seen and evaluated yesterday for the same.  She denies any fever chills or vomiting, she has no chest pain or shortness of breath, no abdominal pain, no paresthesias or weakness.  Patient states that she is \"just really tired.\"  She has a long history again of these headaches, and was seeing neuro-ophthalmology, Dr. Vinny Morales for the same.  Patient states that these are the same type feelings that she has had in the past, and she is here for some pain relief.  She not take any prior to arrival for the same.    ROS;  Please see HPI  O/W negative     PAST MEDICAL HISTORY   has a past medical history of Abdominal pain, Anginal syndrome, Apnea, sleep, Arrhythmia, Arthritis, ASTHMA, Back pain, Borderline personality disorder (HCC), Breath shortness, Bronchitis (02/08/2022), Cardiac arrhythmia, Chickenpox, Chronic UTI (09/18/2010), Cough, Daytime sleepiness, Dental disorder (03/08/2021), Depression, Diabetes (HCC), Diarrhea, Disorder of thyroid, Fall, Fatigue, Frequent headaches, Gasping for breath, Gynecological disorder, Headache(784.0), Heart burn, Heart murmur, History of falling, Indigestion, Migraine, Mitochondrial disease (HCC), 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HCC), Nausea, Obesity, Other fatigue (06/29/2020), Pain (08/15/2012), Painful joint, PCOS (polycystic ovarian syndrome), Pneumonia (2012 12/2020), Psychosis (HCC), Ringing in ears, Scoliosis, Shortness of breath, Sinus tachycardia (10/31/2013), Sleep apnea, Snoring, Tonsillitis, 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HCC), Tuberculosis, Urinary bladder disorder, Urinary incontinence, Weakness, and Wears glasses.    SOCIAL HISTORY  Social History     Tobacco Use   • " Smoking status: Never Smoker   • Smokeless tobacco: Never Used   Vaping Use   • Vaping Use: Never used   Substance and Sexual Activity   • Alcohol use: No     Alcohol/week: 0.0 oz   • Drug use: Not Currently     Frequency: 7.0 times per week     Types: Marijuana   • Sexual activity: Not Currently     Birth control/protection: Implant       SURGICAL HISTORY   has a past surgical history that includes neuro dest facet l/s w/ig sngl (4/21/2015); recovery (1/27/2016); delmar by laparoscopy (8/29/2010); lumbar fusion anterior (8/21/2012); other cardiac surgery (1/2016); tonsillectomy; bowel stimulator insertion (7/13/2016); gastroscopy with balloon dilatation (N/A, 1/4/2017); muscle biopsy (Right, 1/26/2017); other abdominal surgery; laminotomy; bowel stimulator insertion (3/10/2021); lap,diagnostic abdomen (2/14/2022); and ovarian cystectomy (Right, 2/14/2022).    CURRENT MEDICATIONS  Home Medications     Reviewed by Arelis Terrazas R.N. (Registered Nurse) on 06/22/22 at 1447  Med List Status: Partial   Medication Last Dose Status   acetaZOLAMIDE (DIAMOX) 250 MG Tab  Active   albuterol 108 (90 Base) MCG/ACT Aero Soln inhalation aerosol  Active   cephALEXin (KEFLEX) 500 MG Cap  Active   diphenhydrAMINE HCl (BENADRYL PO)  Active   docusate sodium (COLACE) 100 MG Cap  Active   ibuprofen (MOTRIN) 600 MG Tab  Active   ipratropium-albuterol (DUONEB) 0.5-2.5 (3) MG/3ML nebulizer solution  Active   ivabradine (CORLANOR) 5 MG Tab tablet  Active   ivabradine (CORLANOR) 7.5 MG Tab tablet  Active   lactulose 20 GM/30ML Solution  Active   Melatonin 10 MG Tab  Active   ondansetron (ZOFRAN ODT) 4 MG TABLET DISPERSIBLE  Active   traMADol (ULTRAM) 50 MG Tab  Active   traZODone (DESYREL) 100 MG Tab  Active   ziprasidone (GEODON) 40 MG Cap  Active                ALLERGIES  Allergies   Allergen Reactions   • Depakote [Divalproex Sodium] Unspecified     Muscle spasms/muscle pain and weakness     • Doxycycline Anaphylaxis and Vomiting      "Other reaction(s): pustules/blisters   • Montelukast [Singulair] Unspecified     Cardiac effusion   • Vancomycin Itching     Pt becomes flushed in face and chest.   RXN=7/10/16   • Amitriptyline Unspecified     Headaches     • Aripiprazole [Abilify] Unspecified     Headaches/muscle twitching     • Clindamycin Nausea          • Flomax [Tamsulosin Hydrochloride] Swelling   • Metformin Unspecified     Increased lactic acid     Other reaction(s): itching and rash/nausea vomiting   • Tamsulosin Swelling     Swelling of legs   • Tape Rash     Tears skin off  coban with Tegaderm tape ok intermittently  RXN=ongoing   • Wound Dressing Adhesive Hives     By pt report   • Ampicillin Rash     Pt reports that she received a rash    • Ciprofloxacin Rash         • Keflex Rash     Pt states she gets a rash with this medication  Tolerates ceftriaxone  Can take with Benadryl   • Levofloxacin Unspecified     Leg muscle cramps   • Metronidazole Rash     \"Vision problems\"   • Penicillins Hives     Can take with Benadryl   • Sulfa Drugs Itching and Myalgia     Muscle pain and weakness   • Valproic Acid Rash     .       REVIEW OF SYSTEMS  See HPI for further details. Review of systems as above, otherwise all other systems are negative.     PHYSICAL EXAM  VITAL SIGNS: BP (!) 152/66   Pulse 79   Temp 37.1 °C (98.8 °F) (Temporal)   Resp 18   Ht 1.6 m (5' 3\")   Wt 109 kg (240 lb 4.8 oz)   SpO2 96%   BMI 42.57 kg/m²     Constitutional: Well developed, well nourished. No acute distress.  HEENT: Normocephalic, atraumatic. MMM  Neck: Supple, Full range of motion   Chest/Pulmonary:  No respiratory distress.  Equal expansion   Musculoskeletal: No deformity, no edema, neurovascular intact.  Moves all extremities x4  Neuro: Clear speech, appropriate, cooperative, cranial nerves II-XII grossly intact.  Psych: Flat mood and affect      PROCEDURES     MEDICAL RECORD  I have reviewed patient's medical record and pertinent results are " listed.    COURSE & MEDICAL DECISION MAKING  I have reviewed any medical record information, laboratory studies and radiographic results as noted above.    5:13 PM  The patient feels improved, her blood pressure is trending downward, and the patient had a complete work-up yesterday including head CT.  She states that she has a very long history of these headaches, I gave her 2 injections, 1 Compazine and 1 Benadryl.  She is here with grandma, and they are comfortable taking the patient home.  She has no focal neurodeficits, and has outpatient follow-up. The pt states dilaudid works very well for her and she would like some before she goes home to 'lay down in her very dark room.'  The pts grandma is driving her home.     I you have had any blood pressure issues while here in the emergency department, please see your doctor for a further evaluation or work up.    Differential diagnoses include but not limited to: Migraine, subarachnoid, subdural, epidural,    This patient presents with migraine headache.  At this time, I have counseled the patient/family regarding their medications, pain control, and follow up.  They will continue their medications, if any, as prescribed.  They will return immediately for any worsening symptoms and/or any other medical concerns.  They will see their doctor, or contact the doctor provided, in 1-2 days for follow up.       FINAL IMPRESSION  1. Migraine without aura and with status migrainosus, not intractable             Electronically signed by: Valente Castellon D.O., 6/22/2022 3:50 PM

## 2022-06-22 NOTE — ED TRIAGE NOTES
Chief Complaint   Patient presents with   • Headache   • Lightheadedness     Patient was seen here yesterday for the same complaint. Reports that her symptoms are getting worse. Patient states she has intercranial hypertension.   Patient is A+Ox4. VSS  Grandmother is with patient.

## 2022-06-23 NOTE — ED NOTES
Discharge instructions given to pt. Prescriptions unchanged. Pt educated, verbalizes understanding. All belongings accounted for. Pt wheeled out of ED with family at side to go home.

## 2022-06-28 ENCOUNTER — APPOINTMENT (OUTPATIENT)
Dept: URGENT CARE | Facility: CLINIC | Age: 32
End: 2022-06-28
Payer: MEDICARE

## 2022-06-28 ENCOUNTER — HOSPITAL ENCOUNTER (OUTPATIENT)
Facility: MEDICAL CENTER | Age: 32
End: 2022-06-30
Attending: EMERGENCY MEDICINE | Admitting: INTERNAL MEDICINE
Payer: MEDICARE

## 2022-06-28 DIAGNOSIS — H54.61 UNQUALIFIED VISUAL LOSS OF RIGHT EYE WITH NORMAL VISION OF CONTRALATERAL EYE: ICD-10-CM

## 2022-06-28 DIAGNOSIS — H46.9 OPTIC NEURITIS: ICD-10-CM

## 2022-06-28 DIAGNOSIS — R07.9 CHEST PAIN, UNSPECIFIED TYPE: ICD-10-CM

## 2022-06-28 DIAGNOSIS — H53.9 VISION CHANGES: ICD-10-CM

## 2022-06-28 DIAGNOSIS — R51.9 INTRACTABLE HEADACHE, UNSPECIFIED CHRONICITY PATTERN, UNSPECIFIED HEADACHE TYPE: ICD-10-CM

## 2022-06-28 DIAGNOSIS — Z86.69 HISTORY OF IDIOPATHIC INTRACRANIAL HYPERTENSION: ICD-10-CM

## 2022-06-28 LAB
ALBUMIN SERPL BCP-MCNC: 4.6 G/DL (ref 3.2–4.9)
ALBUMIN/GLOB SERPL: 2 G/DL
ALP SERPL-CCNC: 112 U/L (ref 30–99)
ALT SERPL-CCNC: 36 U/L (ref 2–50)
ANION GAP SERPL CALC-SCNC: 12 MMOL/L (ref 7–16)
APTT PPP: 27.8 SEC (ref 24.7–36)
AST SERPL-CCNC: 11 U/L (ref 12–45)
BASOPHILS # BLD AUTO: 0.5 % (ref 0–1.8)
BASOPHILS # BLD: 0.04 K/UL (ref 0–0.12)
BILIRUB SERPL-MCNC: 0.4 MG/DL (ref 0.1–1.5)
BUN SERPL-MCNC: 14 MG/DL (ref 8–22)
CALCIUM SERPL-MCNC: 9.5 MG/DL (ref 8.5–10.5)
CHLORIDE SERPL-SCNC: 113 MMOL/L (ref 96–112)
CO2 SERPL-SCNC: 14 MMOL/L (ref 20–33)
CREAT SERPL-MCNC: 0.85 MG/DL (ref 0.5–1.4)
EKG IMPRESSION: NORMAL
EOSINOPHIL # BLD AUTO: 0.19 K/UL (ref 0–0.51)
EOSINOPHIL NFR BLD: 2.3 % (ref 0–6.9)
ERYTHROCYTE [DISTWIDTH] IN BLOOD BY AUTOMATED COUNT: 40.2 FL (ref 35.9–50)
GFR SERPLBLD CREATININE-BSD FMLA CKD-EPI: 93 ML/MIN/1.73 M 2
GLOBULIN SER CALC-MCNC: 2.3 G/DL (ref 1.9–3.5)
GLUCOSE BLD STRIP.AUTO-MCNC: 82 MG/DL (ref 65–99)
GLUCOSE SERPL-MCNC: 96 MG/DL (ref 65–99)
HCT VFR BLD AUTO: 40.1 % (ref 37–47)
HGB BLD-MCNC: 14 G/DL (ref 12–16)
IMM GRANULOCYTES # BLD AUTO: 0.04 K/UL (ref 0–0.11)
IMM GRANULOCYTES NFR BLD AUTO: 0.5 % (ref 0–0.9)
INR PPP: 1.12 (ref 0.87–1.13)
LYMPHOCYTES # BLD AUTO: 1.92 K/UL (ref 1–4.8)
LYMPHOCYTES NFR BLD: 23.3 % (ref 22–41)
MCH RBC QN AUTO: 31.3 PG (ref 27–33)
MCHC RBC AUTO-ENTMCNC: 34.9 G/DL (ref 33.6–35)
MCV RBC AUTO: 89.7 FL (ref 81.4–97.8)
MONOCYTES # BLD AUTO: 0.53 K/UL (ref 0–0.85)
MONOCYTES NFR BLD AUTO: 6.4 % (ref 0–13.4)
NEUTROPHILS # BLD AUTO: 5.53 K/UL (ref 2–7.15)
NEUTROPHILS NFR BLD: 67 % (ref 44–72)
NRBC # BLD AUTO: 0 K/UL
NRBC BLD-RTO: 0 /100 WBC
PLATELET # BLD AUTO: 216 K/UL (ref 164–446)
PMV BLD AUTO: 11.5 FL (ref 9–12.9)
POTASSIUM SERPL-SCNC: 3.9 MMOL/L (ref 3.6–5.5)
PROT SERPL-MCNC: 6.9 G/DL (ref 6–8.2)
PROTHROMBIN TIME: 14.1 SEC (ref 12–14.6)
RBC # BLD AUTO: 4.47 M/UL (ref 4.2–5.4)
SODIUM SERPL-SCNC: 139 MMOL/L (ref 135–145)
TROPONIN T SERPL-MCNC: <6 NG/L (ref 6–19)
WBC # BLD AUTO: 8.3 K/UL (ref 4.8–10.8)

## 2022-06-28 PROCEDURE — 94760 N-INVAS EAR/PLS OXIMETRY 1: CPT

## 2022-06-28 PROCEDURE — 93005 ELECTROCARDIOGRAM TRACING: CPT | Performed by: EMERGENCY MEDICINE

## 2022-06-28 PROCEDURE — 80053 COMPREHEN METABOLIC PANEL: CPT

## 2022-06-28 PROCEDURE — 85610 PROTHROMBIN TIME: CPT

## 2022-06-28 PROCEDURE — 96374 THER/PROPH/DIAG INJ IV PUSH: CPT

## 2022-06-28 PROCEDURE — 36415 COLL VENOUS BLD VENIPUNCTURE: CPT

## 2022-06-28 PROCEDURE — 84484 ASSAY OF TROPONIN QUANT: CPT

## 2022-06-28 PROCEDURE — 82962 GLUCOSE BLOOD TEST: CPT

## 2022-06-28 PROCEDURE — 96375 TX/PRO/DX INJ NEW DRUG ADDON: CPT | Mod: XU

## 2022-06-28 PROCEDURE — 85730 THROMBOPLASTIN TIME PARTIAL: CPT

## 2022-06-28 PROCEDURE — 99220 PR INITIAL OBSERVATION CARE,LEVL III: CPT | Performed by: INTERNAL MEDICINE

## 2022-06-28 PROCEDURE — 85025 COMPLETE CBC W/AUTO DIFF WBC: CPT

## 2022-06-28 PROCEDURE — G0378 HOSPITAL OBSERVATION PER HR: HCPCS

## 2022-06-28 PROCEDURE — 99285 EMERGENCY DEPT VISIT HI MDM: CPT

## 2022-06-28 PROCEDURE — 700111 HCHG RX REV CODE 636 W/ 250 OVERRIDE (IP): Performed by: EMERGENCY MEDICINE

## 2022-06-28 RX ORDER — ACETAZOLAMIDE 250 MG/1
250 TABLET ORAL 2 TIMES DAILY
Status: DISCONTINUED | OUTPATIENT
Start: 2022-06-29 | End: 2022-06-29

## 2022-06-28 RX ORDER — OXYCODONE HYDROCHLORIDE 5 MG/1
5 TABLET ORAL
Status: DISCONTINUED | OUTPATIENT
Start: 2022-06-28 | End: 2022-06-30

## 2022-06-28 RX ORDER — BISACODYL 10 MG
10 SUPPOSITORY, RECTAL RECTAL
Status: DISCONTINUED | OUTPATIENT
Start: 2022-06-28 | End: 2022-06-30 | Stop reason: HOSPADM

## 2022-06-28 RX ORDER — AMOXICILLIN 250 MG
2 CAPSULE ORAL 2 TIMES DAILY
Status: DISCONTINUED | OUTPATIENT
Start: 2022-06-29 | End: 2022-06-30 | Stop reason: HOSPADM

## 2022-06-28 RX ORDER — ZIPRASIDONE HYDROCHLORIDE 40 MG/1
40 CAPSULE ORAL NIGHTLY
Status: DISCONTINUED | OUTPATIENT
Start: 2022-06-29 | End: 2022-06-30 | Stop reason: HOSPADM

## 2022-06-28 RX ORDER — HEPARIN SODIUM 5000 [USP'U]/ML
5000 INJECTION, SOLUTION INTRAVENOUS; SUBCUTANEOUS EVERY 8 HOURS
Status: DISCONTINUED | OUTPATIENT
Start: 2022-06-29 | End: 2022-06-30 | Stop reason: HOSPADM

## 2022-06-28 RX ORDER — DOCUSATE SODIUM 100 MG/1
100 CAPSULE, LIQUID FILLED ORAL 2 TIMES DAILY
Status: DISCONTINUED | OUTPATIENT
Start: 2022-06-29 | End: 2022-06-30 | Stop reason: HOSPADM

## 2022-06-28 RX ORDER — MORPHINE SULFATE 4 MG/ML
4 INJECTION INTRAVENOUS ONCE
Status: COMPLETED | OUTPATIENT
Start: 2022-06-28 | End: 2022-06-28

## 2022-06-28 RX ORDER — LABETALOL HYDROCHLORIDE 5 MG/ML
20 INJECTION, SOLUTION INTRAVENOUS ONCE
Status: DISPENSED | OUTPATIENT
Start: 2022-06-28 | End: 2022-06-29

## 2022-06-28 RX ORDER — OXYCODONE HYDROCHLORIDE 5 MG/1
2.5 TABLET ORAL
Status: DISCONTINUED | OUTPATIENT
Start: 2022-06-28 | End: 2022-06-30

## 2022-06-28 RX ORDER — MORPHINE SULFATE 4 MG/ML
4 INJECTION INTRAVENOUS ONCE
Status: ACTIVE | OUTPATIENT
Start: 2022-06-28 | End: 2022-06-29

## 2022-06-28 RX ORDER — ONDANSETRON 2 MG/ML
4 INJECTION INTRAMUSCULAR; INTRAVENOUS ONCE
Status: COMPLETED | OUTPATIENT
Start: 2022-06-28 | End: 2022-06-28

## 2022-06-28 RX ORDER — LORAZEPAM 2 MG/ML
1 INJECTION INTRAMUSCULAR ONCE
Status: COMPLETED | OUTPATIENT
Start: 2022-06-28 | End: 2022-06-29

## 2022-06-28 RX ORDER — POLYETHYLENE GLYCOL 3350 17 G/17G
1 POWDER, FOR SOLUTION ORAL
Status: DISCONTINUED | OUTPATIENT
Start: 2022-06-28 | End: 2022-06-30 | Stop reason: HOSPADM

## 2022-06-28 RX ORDER — IPRATROPIUM BROMIDE AND ALBUTEROL SULFATE 2.5; .5 MG/3ML; MG/3ML
3 SOLUTION RESPIRATORY (INHALATION) EVERY 4 HOURS PRN
Status: DISCONTINUED | OUTPATIENT
Start: 2022-06-28 | End: 2022-06-30 | Stop reason: HOSPADM

## 2022-06-28 RX ORDER — TRAZODONE HYDROCHLORIDE 50 MG/1
100 TABLET ORAL NIGHTLY
Status: DISCONTINUED | OUTPATIENT
Start: 2022-06-29 | End: 2022-06-30 | Stop reason: HOSPADM

## 2022-06-28 RX ORDER — ONDANSETRON 2 MG/ML
4 INJECTION INTRAMUSCULAR; INTRAVENOUS ONCE
Status: ACTIVE | OUTPATIENT
Start: 2022-06-28 | End: 2022-06-29

## 2022-06-28 RX ORDER — DIPHENHYDRAMINE HCL 25 MG
50 TABLET ORAL NIGHTLY
Status: DISCONTINUED | OUTPATIENT
Start: 2022-06-29 | End: 2022-06-30 | Stop reason: HOSPADM

## 2022-06-28 RX ORDER — ACETAMINOPHEN 325 MG/1
650 TABLET ORAL EVERY 6 HOURS PRN
Status: DISCONTINUED | OUTPATIENT
Start: 2022-06-28 | End: 2022-06-30 | Stop reason: HOSPADM

## 2022-06-28 RX ORDER — HYDROMORPHONE HYDROCHLORIDE 1 MG/ML
0.25 INJECTION, SOLUTION INTRAMUSCULAR; INTRAVENOUS; SUBCUTANEOUS
Status: DISCONTINUED | OUTPATIENT
Start: 2022-06-28 | End: 2022-06-30

## 2022-06-28 RX ORDER — LABETALOL HYDROCHLORIDE 5 MG/ML
10 INJECTION, SOLUTION INTRAVENOUS EVERY 4 HOURS PRN
Status: DISCONTINUED | OUTPATIENT
Start: 2022-06-28 | End: 2022-06-30 | Stop reason: HOSPADM

## 2022-06-28 RX ORDER — CHOLECALCIFEROL (VITAMIN D3) 125 MCG
10 CAPSULE ORAL
Status: DISCONTINUED | OUTPATIENT
Start: 2022-06-29 | End: 2022-06-30 | Stop reason: HOSPADM

## 2022-06-28 RX ORDER — ONDANSETRON 4 MG/1
4 TABLET, ORALLY DISINTEGRATING ORAL EVERY 6 HOURS PRN
Status: DISCONTINUED | OUTPATIENT
Start: 2022-06-28 | End: 2022-06-30 | Stop reason: HOSPADM

## 2022-06-28 RX ORDER — DEXTROSE AND SODIUM CHLORIDE 5; .45 G/100ML; G/100ML
INJECTION, SOLUTION INTRAVENOUS CONTINUOUS
Status: DISCONTINUED | OUTPATIENT
Start: 2022-06-28 | End: 2022-06-30

## 2022-06-28 RX ADMIN — ONDANSETRON 4 MG: 2 INJECTION INTRAMUSCULAR; INTRAVENOUS at 19:01

## 2022-06-28 RX ADMIN — MORPHINE SULFATE 4 MG: 4 INJECTION INTRAVENOUS at 19:01

## 2022-06-28 ASSESSMENT — FIBROSIS 4 INDEX: FIB4 SCORE: 0.55

## 2022-06-28 NOTE — ED TRIAGE NOTES
"Chief Complaint   Patient presents with   • Blurred Vision     X 1 week, progressively worse, noticeably worse today, 'it's like there's a film over my R eye', pt states blurriness in both eyes but worse in R eye, pt was seen here in this ER twice for the same complaints, other sx include HA and nausea      Pt ambulatory to triage for above complaint, VSS on RA, GCS 15, NAD.    Denies all s/sx of covid, denies recent travel, denies fevers.    Pt returned to [facility]. Educated on triage process and to inform staff of any changes.     BP (!) 157/96   Pulse (!) 106   Temp 37.4 °C (99.3 °F) (Temporal)   Resp 18   Ht 1.6 m (5' 3\")   Wt 110 kg (243 lb 2.7 oz)   SpO2 97%   BMI 43.08 kg/m²     "

## 2022-06-29 ENCOUNTER — APPOINTMENT (OUTPATIENT)
Dept: RADIOLOGY | Facility: MEDICAL CENTER | Age: 32
End: 2022-06-29
Attending: EMERGENCY MEDICINE
Payer: MEDICARE

## 2022-06-29 LAB
ANION GAP SERPL CALC-SCNC: 16 MMOL/L (ref 7–16)
BUN SERPL-MCNC: 13 MG/DL (ref 8–22)
CALCIUM SERPL-MCNC: 9.5 MG/DL (ref 8.5–10.5)
CHLORIDE SERPL-SCNC: 111 MMOL/L (ref 96–112)
CO2 SERPL-SCNC: 11 MMOL/L (ref 20–33)
CREAT SERPL-MCNC: 0.8 MG/DL (ref 0.5–1.4)
CRP SERPL HS-MCNC: 0.46 MG/DL (ref 0–0.75)
GFR SERPLBLD CREATININE-BSD FMLA CKD-EPI: 100 ML/MIN/1.73 M 2
GLUCOSE SERPL-MCNC: 105 MG/DL (ref 65–99)
HCG SERPL QL: NEGATIVE
LACTATE BLD-SCNC: 1.5 MMOL/L (ref 0.5–2)
POTASSIUM SERPL-SCNC: 3.6 MMOL/L (ref 3.6–5.5)
SODIUM SERPL-SCNC: 138 MMOL/L (ref 135–145)

## 2022-06-29 PROCEDURE — A9576 INJ PROHANCE MULTIPACK: HCPCS | Performed by: EMERGENCY MEDICINE

## 2022-06-29 PROCEDURE — G0378 HOSPITAL OBSERVATION PER HR: HCPCS

## 2022-06-29 PROCEDURE — 80048 BASIC METABOLIC PNL TOTAL CA: CPT

## 2022-06-29 PROCEDURE — 86140 C-REACTIVE PROTEIN: CPT

## 2022-06-29 PROCEDURE — 700117 HCHG RX CONTRAST REV CODE 255: Performed by: EMERGENCY MEDICINE

## 2022-06-29 PROCEDURE — 700111 HCHG RX REV CODE 636 W/ 250 OVERRIDE (IP): Performed by: EMERGENCY MEDICINE

## 2022-06-29 PROCEDURE — 700102 HCHG RX REV CODE 250 W/ 637 OVERRIDE(OP): Performed by: NURSE PRACTITIONER

## 2022-06-29 PROCEDURE — 96366 THER/PROPH/DIAG IV INF ADDON: CPT

## 2022-06-29 PROCEDURE — 96375 TX/PRO/DX INJ NEW DRUG ADDON: CPT | Mod: XU

## 2022-06-29 PROCEDURE — 700105 HCHG RX REV CODE 258: Performed by: NURSE PRACTITIONER

## 2022-06-29 PROCEDURE — 700111 HCHG RX REV CODE 636 W/ 250 OVERRIDE (IP)

## 2022-06-29 PROCEDURE — A9270 NON-COVERED ITEM OR SERVICE: HCPCS | Performed by: NURSE PRACTITIONER

## 2022-06-29 PROCEDURE — 84703 CHORIONIC GONADOTROPIN ASSAY: CPT

## 2022-06-29 PROCEDURE — 96372 THER/PROPH/DIAG INJ SC/IM: CPT | Mod: XU

## 2022-06-29 PROCEDURE — 700105 HCHG RX REV CODE 258: Performed by: EMERGENCY MEDICINE

## 2022-06-29 PROCEDURE — 700102 HCHG RX REV CODE 250 W/ 637 OVERRIDE(OP): Performed by: INTERNAL MEDICINE

## 2022-06-29 PROCEDURE — 96365 THER/PROPH/DIAG IV INF INIT: CPT | Mod: XU

## 2022-06-29 PROCEDURE — 99214 OFFICE O/P EST MOD 30 MIN: CPT | Mod: FS | Performed by: NURSE PRACTITIONER

## 2022-06-29 PROCEDURE — 36415 COLL VENOUS BLD VENIPUNCTURE: CPT

## 2022-06-29 PROCEDURE — 83605 ASSAY OF LACTIC ACID: CPT

## 2022-06-29 PROCEDURE — 99225 PR SUBSEQUENT OBSERVATION CARE,LEVEL II: CPT | Performed by: NURSE PRACTITIONER

## 2022-06-29 PROCEDURE — 700111 HCHG RX REV CODE 636 W/ 250 OVERRIDE (IP): Performed by: INTERNAL MEDICINE

## 2022-06-29 PROCEDURE — 700111 HCHG RX REV CODE 636 W/ 250 OVERRIDE (IP): Performed by: NURSE PRACTITIONER

## 2022-06-29 PROCEDURE — 96376 TX/PRO/DX INJ SAME DRUG ADON: CPT | Mod: XU

## 2022-06-29 PROCEDURE — A9270 NON-COVERED ITEM OR SERVICE: HCPCS | Performed by: INTERNAL MEDICINE

## 2022-06-29 PROCEDURE — 70553 MRI BRAIN STEM W/O & W/DYE: CPT | Mod: ME

## 2022-06-29 RX ORDER — LORAZEPAM 2 MG/ML
2 INJECTION INTRAMUSCULAR
Status: DISCONTINUED | OUTPATIENT
Start: 2022-06-29 | End: 2022-06-30 | Stop reason: HOSPADM

## 2022-06-29 RX ORDER — KETOROLAC TROMETHAMINE 30 MG/ML
30 INJECTION, SOLUTION INTRAMUSCULAR; INTRAVENOUS ONCE
Status: COMPLETED | OUTPATIENT
Start: 2022-06-29 | End: 2022-06-29

## 2022-06-29 RX ORDER — MAGNESIUM SULFATE HEPTAHYDRATE 40 MG/ML
2 INJECTION, SOLUTION INTRAVENOUS ONCE
Status: COMPLETED | OUTPATIENT
Start: 2022-06-29 | End: 2022-06-29

## 2022-06-29 RX ORDER — DEXAMETHASONE SODIUM PHOSPHATE 4 MG/ML
4 INJECTION, SOLUTION INTRA-ARTICULAR; INTRALESIONAL; INTRAMUSCULAR; INTRAVENOUS; SOFT TISSUE ONCE
Status: COMPLETED | OUTPATIENT
Start: 2022-06-29 | End: 2022-06-29

## 2022-06-29 RX ORDER — METOCLOPRAMIDE HYDROCHLORIDE 5 MG/ML
10 INJECTION INTRAMUSCULAR; INTRAVENOUS ONCE
Status: COMPLETED | OUTPATIENT
Start: 2022-06-29 | End: 2022-06-29

## 2022-06-29 RX ORDER — SODIUM CHLORIDE 9 MG/ML
1000 INJECTION, SOLUTION INTRAVENOUS ONCE
Status: COMPLETED | OUTPATIENT
Start: 2022-06-29 | End: 2022-06-29

## 2022-06-29 RX ORDER — ACETAZOLAMIDE 250 MG/1
500 TABLET ORAL 2 TIMES DAILY
Status: DISCONTINUED | OUTPATIENT
Start: 2022-06-29 | End: 2022-06-30 | Stop reason: HOSPADM

## 2022-06-29 RX ORDER — KETOROLAC TROMETHAMINE 30 MG/ML
15 INJECTION, SOLUTION INTRAMUSCULAR; INTRAVENOUS
Status: COMPLETED | OUTPATIENT
Start: 2022-06-29 | End: 2022-06-29

## 2022-06-29 RX ADMIN — MAGNESIUM SULFATE HEPTAHYDRATE 2 G: 40 INJECTION, SOLUTION INTRAVENOUS at 13:02

## 2022-06-29 RX ADMIN — METOCLOPRAMIDE 10 MG: 5 INJECTION, SOLUTION INTRAMUSCULAR; INTRAVENOUS at 13:02

## 2022-06-29 RX ADMIN — LORAZEPAM 1 MG: 2 INJECTION INTRAMUSCULAR; INTRAVENOUS at 00:57

## 2022-06-29 RX ADMIN — ACETAZOLAMIDE 500 MG: 250 TABLET ORAL at 18:30

## 2022-06-29 RX ADMIN — SENNOSIDES AND DOCUSATE SODIUM 2 TABLET: 50; 8.6 TABLET ORAL at 18:00

## 2022-06-29 RX ADMIN — HEPARIN SODIUM 5000 UNITS: 5000 INJECTION, SOLUTION INTRAVENOUS; SUBCUTANEOUS at 05:36

## 2022-06-29 RX ADMIN — GADOTERIDOL 20 ML: 279.3 INJECTION, SOLUTION INTRAVENOUS at 01:42

## 2022-06-29 RX ADMIN — ACETAZOLAMIDE 250 MG: 250 TABLET ORAL at 08:45

## 2022-06-29 RX ADMIN — DEXTROSE AND SODIUM CHLORIDE: 5; 450 INJECTION, SOLUTION INTRAVENOUS at 14:11

## 2022-06-29 RX ADMIN — HYDROMORPHONE HYDROCHLORIDE 0.25 MG: 1 INJECTION, SOLUTION INTRAMUSCULAR; INTRAVENOUS; SUBCUTANEOUS at 02:25

## 2022-06-29 RX ADMIN — DOCUSATE SODIUM 100 MG: 100 CAPSULE, LIQUID FILLED ORAL at 07:53

## 2022-06-29 RX ADMIN — KETOROLAC TROMETHAMINE 15 MG: 30 INJECTION, SOLUTION INTRAMUSCULAR; INTRAVENOUS at 21:09

## 2022-06-29 RX ADMIN — DEXTROSE AND SODIUM CHLORIDE: 5; 450 INJECTION, SOLUTION INTRAVENOUS at 00:49

## 2022-06-29 RX ADMIN — ACETAMINOPHEN 650 MG: 325 TABLET, FILM COATED ORAL at 07:53

## 2022-06-29 RX ADMIN — SODIUM CHLORIDE 1000 ML: 9 INJECTION, SOLUTION INTRAVENOUS at 13:00

## 2022-06-29 RX ADMIN — Medication 10 MG: at 21:59

## 2022-06-29 RX ADMIN — DIPHENHYDRAMINE HYDROCHLORIDE 50 MG: 25 TABLET ORAL at 21:59

## 2022-06-29 RX ADMIN — SENNOSIDES AND DOCUSATE SODIUM 2 TABLET: 50; 8.6 TABLET ORAL at 05:36

## 2022-06-29 RX ADMIN — ZIPRASIDONE HYDROCHLORIDE 40 MG: 40 CAPSULE ORAL at 21:09

## 2022-06-29 RX ADMIN — ONDANSETRON 4 MG: 4 TABLET, ORALLY DISINTEGRATING ORAL at 00:29

## 2022-06-29 RX ADMIN — TRAZODONE HYDROCHLORIDE 100 MG: 50 TABLET ORAL at 21:08

## 2022-06-29 RX ADMIN — OXYCODONE 5 MG: 5 TABLET ORAL at 11:03

## 2022-06-29 RX ADMIN — DEXAMETHASONE SODIUM PHOSPHATE 4 MG: 4 INJECTION, SOLUTION INTRA-ARTICULAR; INTRALESIONAL; INTRAMUSCULAR; INTRAVENOUS; SOFT TISSUE at 13:00

## 2022-06-29 RX ADMIN — KETOROLAC TROMETHAMINE 30 MG: 30 INJECTION, SOLUTION INTRAMUSCULAR; INTRAVENOUS at 14:15

## 2022-06-29 ASSESSMENT — ENCOUNTER SYMPTOMS
EYE REDNESS: 0
NECK PAIN: 0
MYALGIAS: 0
VOMITING: 0
WEIGHT LOSS: 0
HEADACHES: 0
SHORTNESS OF BREATH: 0
DIZZINESS: 0
STRIDOR: 0
COUGH: 0
SPEECH CHANGE: 0
FEVER: 0
DIARRHEA: 0
CHILLS: 0
SORE THROAT: 0
EYE DISCHARGE: 0
FALLS: 1
DEPRESSION: 0
HEADACHES: 1
DIZZINESS: 1
NAUSEA: 0
EYE PAIN: 1
FOCAL WEAKNESS: 0
BLURRED VISION: 0
BRUISES/BLEEDS EASILY: 0
HEMOPTYSIS: 0
BLURRED VISION: 1
INSOMNIA: 0
PHOTOPHOBIA: 1
WEAKNESS: 0
SENSORY CHANGE: 0
PALPITATIONS: 0
SEIZURES: 0
DOUBLE VISION: 0
TINGLING: 0

## 2022-06-29 ASSESSMENT — FIBROSIS 4 INDEX: FIB4 SCORE: 0.27

## 2022-06-29 ASSESSMENT — PAIN DESCRIPTION - PAIN TYPE
TYPE: ACUTE PAIN

## 2022-06-29 ASSESSMENT — LIFESTYLE VARIABLES
AVERAGE NUMBER OF DAYS PER WEEK YOU HAVE A DRINK CONTAINING ALCOHOL: 0
HOW MANY TIMES IN THE PAST YEAR HAVE YOU HAD 5 OR MORE DRINKS IN A DAY: 0
TOTAL SCORE: 0
EVER FELT BAD OR GUILTY ABOUT YOUR DRINKING: NO
ALCOHOL_USE: NO
ON A TYPICAL DAY WHEN YOU DRINK ALCOHOL HOW MANY DRINKS DO YOU HAVE: 0
CONSUMPTION TOTAL: NEGATIVE
EVER HAD A DRINK FIRST THING IN THE MORNING TO STEADY YOUR NERVES TO GET RID OF A HANGOVER: NO
HAVE PEOPLE ANNOYED YOU BY CRITICIZING YOUR DRINKING: NO
TOTAL SCORE: 0
TOTAL SCORE: 0
HAVE YOU EVER FELT YOU SHOULD CUT DOWN ON YOUR DRINKING: NO

## 2022-06-29 NOTE — ED NOTES
Received report from SONYA Lima. Patient A&Ox4. Breathing unlabored. Patient appears to be resting comfortably on gurney but reports headache pain. Medicated per MAR.

## 2022-06-29 NOTE — PROGRESS NOTES
A/O X4, due meds were given and well tolerated. V.signs wnl, Pt denies pain, call light within reach and safety precautions maintained. D5 I/2 N/S still ongoing @150ml/hr. Report given to day shift RN.

## 2022-06-29 NOTE — PROGRESS NOTES
4 Eyes Skin Assessment Completed by SONYA Feliz and SONYA Ibrahim.    Head WDL  Ears WDL  Nose WDL  Mouth WDL  Neck WDL  Breast/Chest WDL  Shoulder Blades WDL  Spine WDL  (R) Arm/Elbow/Hand WDL  (L) Arm/Elbow/Hand WDL  Abdomen WDL  Groin WDL  Scrotum/Coccyx/Buttocks WDL  (R) Leg WDL  (L) Leg WDL  (R) Heel/Foot/Toe WDL  (L) Heel/Foot/Toe WDL          Devices In Places Pulse Ox      Interventions In Place Pillows    Possible Skin Injury No    Pictures Uploaded Into Epic N/A  Wound Consult Placed N/A  RN Wound Prevention Protocol Ordered No

## 2022-06-29 NOTE — ASSESSMENT & PLAN NOTE
Has remote history of migraines  Reports worsening headache after hitting her head last week  Initial Noncon MRI negative for any acute findings  Neurology APRN following ordered one-time headache cocktail

## 2022-06-29 NOTE — ED PROVIDER NOTES
"  ED Provider Note     Scribed for Sonal Bryant D.O. by Rocco Katz. 6/28/2022, 6:13 PM.     Primary care provider: Torres Brody M.D.  Means of arrival: Walk-in         History obtained from: Patient  History limited by: None    CHIEF COMPLAINT  Chief Complaint   Patient presents with   • Blurred Vision     X 1 week, progressively worse, noticeably worse today, 'it's like there's a film over my R eye', pt states blurriness in both eyes but worse in R eye, pt was seen here in this ER twice for the same complaints, other sx include HA and nausea        HPI  Kristin Balderrama is a 31 y.o. female with a who presents to the emergency Department for a progressively worsening migraine onset one week ago. Per patient, she reports that she came into the ED a week ago with a head ache and lightheadedness. A CT scan was done on her and it showed signs of intercranial hypertension. She was prescribed acetazolamide 500 mg to take in the morning and at night but she noted it did not resolve her head aches. The patient began to grow concerned when she started to develop associated right eye vision blurriness, photophobia, weakness, and hypertension. She also notes that her nerves \"feel like they are on fire\". She denies any ataxias, but states that she has been enduring recent falls secondary to weakness. The falls have caused injuries to her elbows and left leg. The patient has multiple hard ware devcies in her back that she lost a remote for. Patient additionally notes that she has stents as well. She mentioned that she has been spinal tapped in the past due to her history of migraines, but not on the most recent visit.     REVIEW OF SYSTEMS  Pertinent positives include head ache, right eye vision blurriness, photophobia, weakness, and hypertension. Pertinent negatives include no trouble walking, and ataxia.   See HPI for further details. All other systems are negative.    PAST MEDICAL HISTORY  Past Medical History: " "  Diagnosis Date   • Abdominal pain    • Anginal syndrome     random chest pain especially with tachycardia   • Apnea, sleep    • Arrhythmia     \"sinus tachycardia\", cariologist, Dr. Kumar; ablation 2/2016   • Arthritis     osteo   • ASTHMA     when around smoke   • Back pain    • Borderline personality disorder (HCC)    • Breath shortness     with tachycardia   • Bronchitis 02/08/2022    Last time was 12/21   • Cardiac arrhythmia    • Chickenpox    • Chronic UTI 09/18/2010   • Cough    • Daytime sleepiness    • Dental disorder 03/08/2021    infected tooth   • Depression    • Diabetes (HCC)    • Diarrhea     incontinece   • Disorder of thyroid     Hashimoto's   • Fall    • Fatigue    • Frequent headaches    • Gasping for breath    • Gynecological disorder     PCOS   • Headache(784.0)    • Heart burn    • Heart murmur    • History of falling    • Indigestion    • Migraine    • Mitochondrial disease (HCC)    • 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HCC)    • Nausea    • Obesity    • Other fatigue 06/29/2020   • Pain 08/15/2012    back, 7/10   • Painful joint    • PCOS (polycystic ovarian syndrome)    • Pneumonia 2012 12/2020   • Psychosis (HCC)    • Ringing in ears    • Scoliosis    • Shortness of breath     O2 as needed   • Sinus tachycardia 10/31/2013   • Sleep apnea     CPAP \"pulmonary doctor took me off mid year 2016\"   • Snoring    • Tonsillitis    • 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HCC)     2/8/22: Per pt: not anymore   • Tuberculosis     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   • Urinary bladder disorder     Suprapubic cath. 2/8/22: Not anymore.    • Urinary incontinence    • Weakness    • Wears glasses        FAMILY HISTORY  Family History   Problem Relation Age of Onset   • Hypertension Mother    • Sleep Apnea Mother    • Heart Disease Mother    • Other Mother         hypothryod   • Hypertension Maternal Uncle    • Heart Disease Maternal Grandmother    • Hypertension Maternal Grandmother    • No Known Problems Sister    • " Other Sister         Narcolepsy;fibromyalsia;bone;nerve   • Genitourinary () Problems Sister         endometriosis       SOCIAL HISTORY  Social History     Tobacco Use   • Smoking status: Never Smoker   • Smokeless tobacco: Never Used   Vaping Use   • Vaping Use: Never used   Substance Use Topics   • Alcohol use: No     Alcohol/week: 0.0 oz   • Drug use: Not Currently     Frequency: 7.0 times per week     Types: Marijuana      Social History     Substance and Sexual Activity   Drug Use Not Currently   • Frequency: 7.0 times per week   • Types: Marijuana       SURGICAL HISTORY  Past Surgical History:   Procedure Laterality Date   • OH LAP,DIAGNOSTIC ABDOMEN  2/14/2022    Procedure: LAPAROSCOPY;  Surgeon: Seamus Pisano M.D.;  Location: SURGERY SAME DAY [facility];  Service: Gynecology   • OVARIAN CYSTECTOMY Right 2/14/2022    Procedure: EXCISION, CYST, OVARY;  Surgeon: Seamus Pisano M.D.;  Location: SURGERY SAME DAY [facility];  Service: Gynecology   • BOWEL STIMULATOR INSERTION  3/10/2021    Procedure: INSERTION, ELECTRODE LEADS AND PULSE GENERATOR, NEUROSTIMULATOR, SACRAL - REMOVAL OF INTERSTIM WITH REPLACEMENT OF SACRAL NEUROMODULATION DEVICE;  Surgeon: Joe Noyola M.D.;  Location: [facility];  Service: General   • MUSCLE BIOPSY Right 1/26/2017    Procedure: MUSCLE BIOPSY - THIGH;  Surgeon: Isidro Vigil M.D.;  Location: [facility];  Service:    • GASTROSCOPY WITH BALLOON DILATATION N/A 1/4/2017    Procedure: GASTROSCOPY WITH DILATATION;  Surgeon: Torres Vargas M.D.;  Location: [facility];  Service:    • BOWEL STIMULATOR INSERTION  7/13/2016    Procedure: BOWEL STIMULATOR INSERTION FOR PERMANENT INTERSTIM SACRAL IMPLANT;  Surgeon: Joe Noyola M.D.;  Location: SURGERY [facility];  Service:    • RECOVERY  1/27/2016    Procedure: CATH LAB EP STUDY WITH SINUS NODE MODIFICATION ABHINAV;  Surgeon: Recoveryonly Surgery;  Location: SURGERY PRE-POST PROC UNIT [facility];  Service:     • OTHER CARDIAC SURGERY  2016    cardiac ablation   • NEURO DEST FACET L/S W/IG SNGL  2015    Performed by Reza Tabor at SUR[facility]ERY [facility]   • LUMBAR FUSION ANTERIOR  2012    Performed by SUSIE SAWANT at SURGERY [facility]   • ALYSSA BY LAPAROSCOPY  2010    Performed by SHAYY JOHNS at SURGERY [facility]   • LAMINOTOMY     • OTHER ABDOMINAL SURGERY     • TONSILLECTOMY      tonsillectomy       CURRENT MEDICATIONS  Current Facility-Administered Medications:   •  labetalol (NORMODYNE/TRANDATE) injection 20 mg, 20 mg, Intravenous, Once, Sonal Bryant D.O.    Current Outpatient Medications:   •  acetaZOLAMIDE (DIAMOX) 250 MG Tab, Take 2 Tablets by mouth 2 times a day for 14 days., Disp: 56 Tablet, Rfl: 0  •  cephALEXin (KEFLEX) 500 MG Cap, Take 500 mg by mouth in the morning and 500 mg in the evening., Disp: , Rfl:   •  ondansetron (ZOFRAN ODT) 4 MG TABLET DISPERSIBLE, Take 1 Tablet by mouth every 6 hours as needed., Disp: 10 Tablet, Rfl: 0  •  ibuprofen (MOTRIN) 600 MG Tab, Take 1 Tablet by mouth every 6 hours as needed., Disp: 30 Tablet, Rfl: 1  •  lactulose 20 GM/30ML Solution, Take 30 mL by mouth 2 times a day., Disp: 473 mL, Rfl: 0  •  docusate sodium (COLACE) 100 MG Cap, Take 1 Capsule by mouth 2 times a day., Disp: 60 Capsule, Rfl: 0  •  traZODone (DESYREL) 100 MG Tab, Take 100 mg by mouth every evening., Disp: , Rfl:   •  diphenhydrAMINE HCl (BENADRYL PO), Take 50 mg by mouth every evening. Night time, Disp: , Rfl:   •  ivabradine (CORLANOR) 5 MG Tab tablet, Take 1 Tablet by mouth 2 times a day with meals., Disp: 60 Tablet, Rfl: 5  •  ivabradine (CORLANOR) 7.5 MG Tab tablet, Take 7.5 mg by mouth 1 time a day as needed., Disp: , Rfl:   •  ziprasidone (GEODON) 40 MG Cap, TAKE 1 CAPSULE BY MOUTH TWICE A DAY. APPOINTMENT REQUIRED FOR ADDITIONAL REFILLS.  CALL [phone]. . (Patient taking differently: Take 40 mg by mouth every evening.), Disp: 60 Capsule,  "Rfl: 1  •  traMADol (ULTRAM) 50 MG Tab, Take 50 mg by mouth 4 times a day., Disp: , Rfl:   •  ipratropium-albuterol (DUONEB) 0.5-2.5 (3) MG/3ML nebulizer solution, Take 3 mL by nebulization every four hours as needed for Shortness of Breath. Nebulizer, Disp: 360 mL, Rfl: 2  •  Melatonin 10 MG Tab, Take 10 mg by mouth every bedtime., Disp: , Rfl:   •  albuterol 108 (90 Base) MCG/ACT Aero Soln inhalation aerosol, Inhale 2 Puffs every 6 hours as needed for Shortness of Breath., Disp: 8.5 g, Rfl: 6    ALLERGIES  Allergies   Allergen Reactions   • Depakote [Divalproex Sodium] Unspecified     Muscle spasms/muscle pain and weakness     • Doxycycline Anaphylaxis and Vomiting     Other reaction(s): pustules/blisters   • Montelukast [Singulair] Unspecified     Cardiac effusion   • Vancomycin Itching     Pt becomes flushed in face and chest.   RXN=7/10/16   • Amitriptyline Unspecified     Headaches     • Aripiprazole [Abilify] Unspecified     Headaches/muscle twitching     • Clindamycin Nausea          • Flomax [Tamsulosin Hydrochloride] Swelling   • Metformin Unspecified     Increased lactic acid     Other reaction(s): itching and rash/nausea vomiting   • Tamsulosin Swelling     Swelling of legs   • Tape Rash     Tears skin off  coban with Tegaderm tape ok intermittently  RXN=ongoing   • Wound Dressing Adhesive Hives     By pt report   • Ampicillin Rash     Pt reports that she received a rash    • Ciprofloxacin Rash         • Keflex Rash     Pt states she gets a rash with this medication  Tolerates ceftriaxone  Can take with Benadryl   • Levofloxacin Unspecified     Leg muscle cramps   • Metronidazole Rash     \"Vision problems\"   • Penicillins Hives     Can take with Benadryl   • Sulfa Drugs Itching and Myalgia     Muscle pain and weakness   • Valproic Acid Rash     .       PHYSICAL EXAM  VITAL SIGNS: BP (!) 157/96   Pulse (!) 106   Temp 37.4 °C (99.3 °F) (Temporal)   Resp 18   Ht 1.6 m (5' 3\")   Wt 110 kg (243 lb 2.7 " oz)   SpO2 97%   BMI 43.08 kg/m²     Constitutional: Patient is well developed, well nourished.  Patient is very photophobic, wearing sunglasses in the room secondary to her eye pain.  Very odd affect.  HENT: Normocephalic,  Nose normal with no mucosal edema or drainage. Oropharynx moist without erythema or exudates.  Eyes: Pupils 2 mm equal and reactive but photosensitive, EOMI, Conjunctiva without erythema or exudates.   Neck: Supple   Lymphatic: No lymphadenopathy noted.   Cardiovascular: Normal heart rate and Regular rhythm. No murmur  Thorax & Lungs: Clear and equal breath sounds with good excursion. No respiratory distress, no rhonchi, wheezing or rales.   Abdomen: Bowel sounds normal in all four quadrants. Soft,nontender, obese, no flank tenderness.  Skin: Warm, Dry, No erythema, No rashes.   Back: No cervical, thoracic, or lumbosacral tenderness.     Extremities: Peripheral pulses 4/4 No edema, No tenderness  Musculoskeletal: Normal range of motion in all major joints. No tenderness to palpation or major deformities noted.   Neurologic: Alert & oriented x 3, Normal motor function, Normal sensory function, No lateralizing or focal deficits noted. DTR's 4/4 bilaterally.  Subjective peripheral visual field loss on the right eye.  Psychiatric: Affect very odd.    DIAGNOSTICS/PROCEDURES    LABS  Results for orders placed or performed during the hospital encounter of 06/28/22   CBC WITH DIFFERENTIAL   Result Value Ref Range    WBC 8.3 4.8 - 10.8 K/uL    RBC 4.47 4.20 - 5.40 M/uL    Hemoglobin 14.0 12.0 - 16.0 g/dL    Hematocrit 40.1 37.0 - 47.0 %    MCV 89.7 81.4 - 97.8 fL    MCH 31.3 27.0 - 33.0 pg    MCHC 34.9 33.6 - 35.0 g/dL    RDW 40.2 35.9 - 50.0 fL    Platelet Count 216 164 - 446 K/uL    MPV 11.5 9.0 - 12.9 fL    Neutrophils-Polys 67.00 44.00 - 72.00 %    Lymphocytes 23.30 22.00 - 41.00 %    Monocytes 6.40 0.00 - 13.40 %    Eosinophils 2.30 0.00 - 6.90 %    Basophils 0.50 0.00 - 1.80 %    Immature  Granulocytes 0.50 0.00 - 0.90 %    Nucleated RBC 0.00 /100 WBC    Neutrophils (Absolute) 5.53 2.00 - 7.15 K/uL    Lymphs (Absolute) 1.92 1.00 - 4.80 K/uL    Monos (Absolute) 0.53 0.00 - 0.85 K/uL    Eos (Absolute) 0.19 0.00 - 0.51 K/uL    Baso (Absolute) 0.04 0.00 - 0.12 K/uL    Immature Granulocytes (abs) 0.04 0.00 - 0.11 K/uL    NRBC (Absolute) 0.00 K/uL   COMP METABOLIC PANEL   Result Value Ref Range    Sodium 139 135 - 145 mmol/L    Potassium 3.9 3.6 - 5.5 mmol/L    Chloride 113 (H) 96 - 112 mmol/L    Co2 14 (L) 20 - 33 mmol/L    Anion Gap 12.0 7.0 - 16.0    Glucose 96 65 - 99 mg/dL    Bun 14 8 - 22 mg/dL    Creatinine 0.85 0.50 - 1.40 mg/dL    Calcium 9.5 8.5 - 10.5 mg/dL    AST(SGOT) 11 (L) 12 - 45 U/L    ALT(SGPT) 36 2 - 50 U/L    Alkaline Phosphatase 112 (H) 30 - 99 U/L    Total Bilirubin 0.4 0.1 - 1.5 mg/dL    Albumin 4.6 3.2 - 4.9 g/dL    Total Protein 6.9 6.0 - 8.2 g/dL    Globulin 2.3 1.9 - 3.5 g/dL    A-G Ratio 2.0 g/dL   TROPONIN   Result Value Ref Range    Troponin T <6 6 - 19 ng/L   APTT   Result Value Ref Range    APTT 27.8 24.7 - 36.0 sec   PROTHROMBIN TIME (INR)   Result Value Ref Range    PT 14.1 12.0 - 14.6 sec    INR 1.12 0.87 - 1.13   ESTIMATED GFR   Result Value Ref Range    GFR (CKD-EPI) 93 >60 mL/min/1.73 m 2   LACTIC ACID   Result Value Ref Range    Lactic Acid 1.5 0.5 - 2.0 mmol/L   EKG (NOW)   Result Value Ref Range    Report       [facility]    Test Date:  2022  Pt Name:    HARLEY DE LA CRUZ              Department: ER  MRN:        [id number]                      Room:        19  Gender:     Female                       Technician: [id number]  :        1990                   Requested By:CHARISSE LAL  Order #:    [id number]                    Reading MD:    Measurements  Intervals                                Axis  Rate:       80                           P:          13  SD:         144                          QRS:        16  QRSD:       96                            T:          18  QT:         416  QTc:        480    Interpretive Statements  SINUS RHYTHM  BORDERLINE PROLONGED QT INTERVAL  Compared to ECG 2022 15:23:54  No significant changes     POCT glucose device results   Result Value Ref Range    POC Glucose, Blood 82 65 - 99 mg/dL     *Note: Due to a large number of results and/or encounters for the requested time period, some results have not been displayed. A complete set of results can be found in Results Review.       Labs reviewed by me    EKG  Results for orders placed or performed during the hospital encounter of 22   EKG (NOW)   Result Value Ref Range    Report       [facility] Emergency Dept.    Test Date:  2022  Pt Name:    HARLEY DE LA CRUZ              Department: ER  MRN:        [id number]                      Room:        19  Gender:     Female                       Technician: [id number]  :        1990                   Requested By:CHARISSE LAL  Order #:    [id number]                    Reading MD:    Measurements  Intervals                                Axis  Rate:       80                           P:          13  CT:         144                          QRS:        16  QRSD:       96                           T:          18  QT:         416  QTc:        480    Interpretive Statements  SINUS RHYTHM  BORDERLINE PROLONGED QT INTERVAL  Compared to ECG 2022 15:23:54  No significant changes       *Note: Due to a large number of results and/or encounters for the requested time period, some results have not been displayed. A complete set of results can be found in Results Review.         RADIOLOGY/PROCEDURES  MR-BRAIN-WITH & W/O    (Results Pending)     Results and radiologist interpretation reviewed by me.     COURSE & MEDICAL DECISION MAKING  Pertinent Labs & Imaging studies reviewed. (See chart for details)    6:13 PM - Patient seen and evaluated at bedside. Patient verbalizes  understanding and agreement to this plan of care. Ordered for EKG, Prothrombin time, APTT, Troponin, CMP, and CBC w/ Diff to evaluate. Patient will be treated with Normodyne/Trandate 20 mg via injection, Zofran 4 mg via injection, and Morphine 4 mg via injection for her symptoms. Differential diagnoses include, but are not limited to, cerebral edema, intercranial bleeding, and optic neuritis    6:37 PM - consulted with pharmacy at this time. He recommends Labetalol for the patient.  It appears that the patient's blood pressure was not elevated per nursing staff that that was not the appropriate blood pressure for her it was from a previous patient therefore the labetalol was held as her pressure is only 140 systolic.    Her laboratories revealed a chloride of 113 CO2 14 anion gap of 12 liver enzymes are unremarkable white count is normal H&H is stable.  Twelve-lead EKG shows sinus rhythm with no acute changes.  MRI has been ordered.  7:56 PM - Ordered MR-Brain w/ and w/o to evaluate    10:40 PM - Paged hospitalist.    10:47 PM - Patient feels like her sugar is dropping and pain is worsening.  Fingerstick glucose was 82 patient was given some juice.    10:52 PM - I treated patient with Zofran 4 mg via syringe, Morphine 4 mg via injection, and Ativan 1 mg via injection.    10:58 PM - I discussed the patient's case and the above findings with Dr. Bush (Hospitalist) who agreed to evaluate patient for hospitalization.     10:53 PM - Patient was reevaluated at bedside. Discussed lab and radiology results with the patient and informed them of plans for admission. The patient had the opportunity to ask any questions. The plan for hospitalization was discussed with the patient given the their current presentation and diagnostic study results. Patient is understanding and agreeable to the plan for hospitalization.      11:03 PM- Dr. Bush evaluated the patient at bedside. He would like me to page out a consult to  Neurology and order a lactic acid before she can be admitted. Patient given sugar and half normal saline. Paged Neurology at this time.     11:03 PM - I discussed the patient's case and the above findings with Dr. Lerma (Neurology) who said patient needs an ophthalmologist consult in the morning to check for visual field. Depending on these results she may need lumbar puncture or shunt.    DISPOSITION:  Patient will be hospitalized by Dr. Bush in guarded condition.      FINAL IMPRESSION  1. Intractable headache, unspecified chronicity pattern, unspecified headache type    2.  History of idiopathic intracranial hypertension  3.  Subjective visual loss right eye   Rocco RODRIGUEZ (Eva), am scribing for, and in the presence of, Sonal Bryant D.O..    Electronically signed by: Rocco Katz (Eva), 6/28/2022    ISonal D.O. personally performed the services described in this documentation, as scribed by Rocco Katz in my presence, and it is both accurate and complete. C    The note accurately reflects work and decisions made by me.  Sonal Bryant D.O.  6/29/2022  2:18 AM

## 2022-06-29 NOTE — PROGRESS NOTES
Received pt to [facility]. A/O x4, on R/A, breathing unlabored and no acute respiratory distress noted. Pt denies pain at this time. Pt oriented to room, POC reviewed with pt, all questions answered to the best of my knowledge and pt verbalized understanding. LFA and RFA IV access intact. D5 I/2 N/S connected per MAR order, infusing at 150ml/hr. Multiple hard ware device connected to her back noted and intact, pt verbalized she lost the remote. Pt stable.

## 2022-06-29 NOTE — PROGRESS NOTES
"[facility] Medicine Daily Progress Note    Date of Service  6/29/2022    Chief Complaint  Kristin Balderrama is a 31 y.o. female admitted 6/28/2022 with, blurred vision, right eye pain.    Hospital Course  Kristin Balderrama is a 31 y.o. female who presented 6/28/2022  with a who presents to the emergency Department for a progressively worsening migraine onset one week ago. Per patient, she reports that she came into the ED a week ago with a head ache and lightheadedness. A CT scan was done on her and it showed signs of intercranial hypertension. She was prescribed acetazolamide 500 mg to take in the morning and at night but she noted it did not resolve her head aches. The patient began to grow concerned when she started to develop associated right eye vision blurriness, photophobia, weakness, and hypertension. She also notes that her nerves \"feel like they are on fire\". She denies any ataxias, but states that she has been enduring recent falls secondary to weakness.    Patient was seen for similar issue in June 2021, at that time was noted to have optic neuritis, was placed on high-dose IV steroids.  Patient also reports that she has had similar headaches in the past which have improved following lumbar punctures.    Initial brain MRI was negative for any acute findings.  Discussed with APRN neurology she recommends additional imaging including an MRI of the orbits with and without contrast, this imaging will help determine if patient is having recurrent optic neuritis.  Patient also treated with one-time headache cocktail.    Discussed with on-call ophthalmologist, her recommendation is that patient be set up to see Dr. Torre outpatient who utilizes in neuro-ophthalmology.      Interval Problem Update  Patient continues to complain of severe right-sided eye pain.  States that it feels like her nerves are \"on fire.\"  Also has blurred vision in right eye.  Complaining of photophobia, nausea and general " malaise.  -Initial Non-con MRI brain negative for any acute findings  -Neurology APRN is familiar with patient and following, recommending orbital MRI with and without contrast to rule out optic neuritis  -Patient received one-time headache cocktail  -Need outpatient follow-up with neuro-ophthalmology, previous neuro-ophthalmologist  retired, can now see Dr. Torre, will need referral    I have discussed this patient's plan of care and discharge plan at IDT rounds today with Case Management, Nursing, Nursing leadership, and other members of the IDT team.    Consultants/Specialty  neurology    Code Status  Full Code    Disposition  Patient is not medically cleared for discharge.   Anticipate discharge to to home with close outpatient follow-up.  I have placed the appropriate orders for post-discharge needs.    Review of Systems  Review of Systems   Constitutional: Negative for chills, fever and malaise/fatigue.   Eyes: Positive for blurred vision, photophobia and pain. Negative for double vision, discharge and redness.   Respiratory: Negative for cough and shortness of breath.    Cardiovascular: Negative for chest pain and leg swelling.   Gastrointestinal: Negative for diarrhea, nausea and vomiting.   Genitourinary: Negative for dysuria.   Musculoskeletal: Positive for falls.   Neurological: Positive for dizziness and headaches. Negative for tingling, sensory change, speech change, focal weakness, seizures and weakness.        Physical Exam  Temp:  [36.8 °C (98.3 °F)-37.4 °C (99.3 °F)] 36.8 °C (98.3 °F)  Pulse:  [] 91  Resp:  [16-20] 16  BP: (112-157)/(57-96) 126/76  SpO2:  [94 %-98 %] 97 %    Physical Exam  Vitals and nursing note reviewed.   Constitutional:       General: She is not in acute distress.     Appearance: She is obese. She is ill-appearing.   HENT:      Head: Normocephalic.   Eyes:      General: Lids are normal. Lids are everted, no foreign bodies appreciated. No visual field  deficit.        Right eye: No foreign body.         Left eye: No foreign body.      Extraocular Movements:      Right eye: Normal extraocular motion.      Left eye: Normal extraocular motion.      Pupils: Pupils are equal, round, and reactive to light.   Cardiovascular:      Rate and Rhythm: Normal rate and regular rhythm.      Pulses: Normal pulses.      Heart sounds: Normal heart sounds. No murmur heard.  Pulmonary:      Effort: Pulmonary effort is normal.      Breath sounds: Normal breath sounds. No decreased air movement. No wheezing.   Abdominal:      General: Bowel sounds are normal.      Palpations: Abdomen is soft.      Tenderness: There is no abdominal tenderness.   Musculoskeletal:      Cervical back: Full passive range of motion without pain and normal range of motion. No rigidity.      Right lower leg: No edema.      Left lower leg: No edema.   Skin:     General: Skin is warm.   Neurological:      General: No focal deficit present.      Mental Status: She is alert and oriented to person, place, and time.      Cranial Nerves: No cranial nerve deficit.   Psychiatric:         Behavior: Behavior is cooperative.         Fluids    Intake/Output Summary (Last 24 hours) at 6/29/2022 1348  Last data filed at 6/29/2022 0536  Gross per 24 hour   Intake 100 ml   Output 300 ml   Net -200 ml       Laboratory  Recent Labs     06/28/22  1904   WBC 8.3   RBC 4.47   HEMOGLOBIN 14.0   HEMATOCRIT 40.1   MCV 89.7   MCH 31.3   MCHC 34.9   RDW 40.2   PLATELETCT 216   MPV 11.5     Recent Labs     06/28/22  1904 06/29/22  0015   SODIUM 139 138   POTASSIUM 3.9 3.6   CHLORIDE 113* 111   CO2 14* 11*   GLUCOSE 96 105*   BUN 14 13   CREATININE 0.85 0.80   CALCIUM 9.5 9.5     Recent Labs     06/28/22  1904   APTT 27.8   INR 1.12               Imaging  MR-BRAIN-WITH & W/O   Final Result      1.  MRI of the brain without and with contrast within normal limits.      2.  Partially empty sella is noted.           Assessment/Plan  * Vision  changes- (present on admission)  Assessment & Plan  Possibly secondary to intracranial hypertension  MRI Noncon of the brain was completed, showed no acute findings  APRN neurology, Cande, is following patient recommends MRI orbits with and without contrast, patient has a history of optic neuritis  Ophthalmology contacted, they recommend patient follow-up with neuro-ophthalmology  Patient was previously followed by Dr. Zee who has since retired  Patient needs an outpatient referral to [facility] specializes in neuro-ophthalmology, he is currently out of the [place]      Intracranial hypertension- (present on admission)  Assessment & Plan  Continue Diamox    Metabolic acidosis  Assessment & Plan  Likely secondary to Diamox, follow-up lactic acid and repeat chemistry    TONYA (obstructive sleep apnea)- (present on admission)  Assessment & Plan  CPAP ordered    Headache  Assessment & Plan  Has remote history of migraines  Reports worsening headache after hitting her head last week  Initial Noncon MRI negative for any acute findings  Neurology APRN following ordered one-time headache cocktail         VTE prophylaxis: SCDs/TEDs    I have performed a physical exam and reviewed and updated ROS and Plan today (6/29/2022). In review of yesterday's note (6/28/2022), there are no changes except as documented above.

## 2022-06-29 NOTE — ED NOTES
"Med Rec complete per Pt at bedside.  Allergies reviewed.  Home Pharmacy:  [facility]    Pt reports that she takes Corlanor, has 2 different RX for this medication(5MG BID OR 7.5MG QD). She states she takes \"whatever strength\" she's able to get at the time. Currently Pt is taking 7.5mg QD.  "

## 2022-06-29 NOTE — H&P
"Hospital Medicine History & Physical Note    Date of Service  6/28/22    Primary Care Physician  Torres Brody M.D.    Consultants  neurology    Specialist Names: Dr Lerma    Code Status  Full Code    Chief Complaint  Chief Complaint   Patient presents with   • Blurred Vision     X 1 week, progressively worse, noticeably worse today, 'it's like there's a film over my R eye', pt states blurriness in both eyes but worse in R eye, pt was seen here in this ER twice for the same complaints, other sx include HA and nausea        History of Presenting Illness  Kristin Balderrama is a 31 y.o. female who presented 6/28/2022  with a who presents to the emergency Department for a progressively worsening migraine onset one week ago. Per patient, she reports that she came into the ED a week ago with a head ache and lightheadedness. A CT scan was done on her and it showed signs of intercranial hypertension. She was prescribed acetazolamide 500 mg to take in the morning and at night but she noted it did not resolve her head aches. The patient began to grow concerned when she started to develop associated right eye vision blurriness, photophobia, weakness, and hypertension. She also notes that her nerves \"feel like they are on fire\". She denies any ataxias, but states that she has been enduring recent falls secondary to weakness. The falls have caused injuries to her elbows and left leg. The patient has multiple hard ware devcies in her back that she lost a remote for. Patient additionally notes that she has stents as well. She mentioned that she has had a multiple lumbar punctures for excessive spinal fluid.   Neurologist Dr. James is consulted recommending MRI, ophthalmology consult and consideration of LP and possible shunt evaluation.  At bedside she complains of headache and photophobia though denies fever or stiff neck.      I discussed the plan of care with patient.    Review of Systems  Review of Systems   Constitutional: " Negative for fever, malaise/fatigue and weight loss.   HENT: Negative for sore throat and tinnitus.    Eyes: Negative for blurred vision and double vision.   Respiratory: Negative for cough, hemoptysis and stridor.    Cardiovascular: Negative for chest pain and palpitations.   Gastrointestinal: Negative for nausea and vomiting.   Genitourinary: Negative for dysuria and urgency.   Musculoskeletal: Negative for myalgias and neck pain.   Skin: Negative for itching and rash.   Neurological: Negative for dizziness and headaches.   Endo/Heme/Allergies: Does not bruise/bleed easily.   Psychiatric/Behavioral: Negative for depression. The patient does not have insomnia.        Past Medical History   has a past medical history of Abdominal pain, Anginal syndrome, Apnea, sleep, Arrhythmia, Arthritis, ASTHMA, Back pain, Borderline personality disorder ([facility]), Breath shortness, Bronchitis (02/08/2022), Cardiac arrhythmia, Chickenpox, Chronic UTI (09/18/2010), Cough, Daytime sleepiness, Dental disorder (03/08/2021), Depression, Diabetes ([facility]), Diarrhea, Disorder of thyroid, Fall, Fatigue, Frequent headaches, Gasping for breath, Gynecological disorder, Headache(784.0), Heart burn, Heart murmur, History of falling, Indigestion, Migraine, 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    Surgical History   has a past surgical history that includes neuro dest facet l/s w/ig sngl (4/21/2015); recovery (1/27/2016); delmar by laparoscopy (8/29/2010); lumbar fusion anterior (8/21/2012); other cardiac surgery (1/2016); tonsillectomy;  bowel stimulator insertion (7/13/2016); gastroscopy with balloon dilatation (N/A, 1/4/2017); muscle biopsy (Right, 1/26/2017); other abdominal surgery; laminotomy; bowel stimulator insertion (3/10/2021); pr lap,diagnostic abdomen (2/14/2022); and ovarian cystectomy (Right, 2/14/2022).     Family History  family history includes Genitourinary () Problems in her sister; Heart Disease in her maternal grandmother and mother; Hypertension in her maternal grandmother, maternal uncle, and mother; No Known Problems in her sister; Other in her mother and sister; Sleep Apnea in her mother.   Family history reviewed with patient. There is no family history that is pertinent to the chief complaint.     Social History   reports that she has never smoked. She has never used smokeless tobacco. She reports previous drug use. Frequency: 7.00 times per week. Drug: Marijuana. She reports that she does not drink alcohol.    Allergies  Allergies   Allergen Reactions   • Depakote [Divalproex Sodium] Unspecified     Muscle spasms/muscle pain and weakness     • Doxycycline Anaphylaxis and Vomiting     Other reaction(s): pustules/blisters   • Montelukast [Singulair] Unspecified     Cardiac effusion   • Vancomycin Itching     Pt becomes flushed in face and chest.   RXN=7/10/16   • Amitriptyline Unspecified     Headaches     • Aripiprazole [Abilify] Unspecified     Headaches/muscle twitching     • Clindamycin Nausea          • Flomax [Tamsulosin Hydrochloride] Swelling   • Metformin Unspecified     Increased lactic acid     Other reaction(s): itching and rash/nausea vomiting   • Tamsulosin Swelling     Swelling of legs   • Tape Rash     Tears skin off  coban with Tegaderm tape ok intermittently  RXN=ongoing   • Wound Dressing Adhesive Hives     By pt report   • Ampicillin Rash     Pt reports that she received a rash    • Ciprofloxacin Rash         • Keflex Rash     Pt states she gets a rash with this medication  Tolerates ceftriaxone  Can  "take with Benadryl   • Levofloxacin Unspecified     Leg muscle cramps   • Metronidazole Rash     \"Vision problems\"   • Penicillins Hives     Can take with Benadryl   • Sulfa Drugs Itching and Myalgia     Muscle pain and weakness   • Valproic Acid Rash     .       Medications  Prior to Admission Medications   Prescriptions Last Dose Informant Patient Reported? Taking?   Melatonin 10 MG Tab  Patient Yes No   Sig: Take 10 mg by mouth every bedtime.   acetaZOLAMIDE (DIAMOX) 250 MG Tab   No No   Sig: Take 2 Tablets by mouth 2 times a day for 14 days.   albuterol 108 (90 Base) MCG/ACT Aero Soln inhalation aerosol  Patient No No   Sig: Inhale 2 Puffs every 6 hours as needed for Shortness of Breath.   cephALEXin (KEFLEX) 500 MG Cap   Yes No   Sig: Take 500 mg by mouth in the morning and 500 mg in the evening.   diphenhydrAMINE HCl (BENADRYL PO)  Patient Yes No   Sig: Take 50 mg by mouth every evening. Night time   docusate sodium (COLACE) 100 MG Cap   No No   Sig: Take 1 Capsule by mouth 2 times a day.   ibuprofen (MOTRIN) 600 MG Tab   No No   Sig: Take 1 Tablet by mouth every 6 hours as needed.   ipratropium-albuterol (DUONEB) 0.5-2.5 (3) MG/3ML nebulizer solution  Patient No No   Sig: Take 3 mL by nebulization every four hours as needed for Shortness of Breath. Nebulizer   ivabradine (CORLANOR) 5 MG Tab tablet  Patient No No   Sig: Take 1 Tablet by mouth 2 times a day with meals.   ivabradine (CORLANOR) 7.5 MG Tab tablet  Patient Yes No   Sig: Take 7.5 mg by mouth 1 time a day as needed.   lactulose 20 GM/30ML Solution   No No   Sig: Take 30 mL by mouth 2 times a day.   ondansetron (ZOFRAN ODT) 4 MG TABLET DISPERSIBLE   No No   Sig: Take 1 Tablet by mouth every 6 hours as needed.   traMADol (ULTRAM) 50 MG Tab   Yes No   Sig: Take 50 mg by mouth 4 times a day.   traZODone (DESYREL) 100 MG Tab   Yes No   Sig: Take 100 mg by mouth every evening.   ziprasidone (GEODON) 40 MG Cap   No No   Sig: TAKE 1 CAPSULE BY MOUTH TWICE A " DAY. APPOINTMENT REQUIRED FOR ADDITIONAL REFILLS.  CALL [phone]. .   Patient taking differently: Take 40 mg by mouth every evening.      Facility-Administered Medications: None       Physical Exam  Temp:  [37.4 °C (99.3 °F)] 37.4 °C (99.3 °F)  Pulse:  [] 85  Resp:  [18] 18  BP: (112-157)/(57-96) 112/57  SpO2:  [97 %-98 %] 97 %  Blood Pressure: 112/57   Temperature: 37.4 °C (99.3 °F)   Pulse: 85   Respiration: 18   Pulse Oximetry: 97 %       Physical Exam  Vitals and nursing note reviewed.   Constitutional:       General: She is not in acute distress.     Appearance: Normal appearance. She is normal weight. She is not toxic-appearing.   HENT:      Head: Normocephalic and atraumatic.      Nose: Nose normal. No congestion or rhinorrhea.      Mouth/Throat:      Mouth: Mucous membranes are moist.      Pharynx: Oropharynx is clear.   Eyes:      Extraocular Movements: Extraocular movements intact.      Conjunctiva/sclera: Conjunctivae normal.      Comments: Pupils 2.5 mm symmetric and poorly reactive to light   Neck:      Vascular: No carotid bruit.   Cardiovascular:      Rate and Rhythm: Normal rate and regular rhythm.      Pulses: Normal pulses.      Heart sounds: Normal heart sounds. No murmur heard.    No gallop.   Pulmonary:      Effort: No respiratory distress.      Breath sounds: Normal breath sounds. No wheezing or rales.   Abdominal:      General: Abdomen is flat. Bowel sounds are normal. There is no distension.      Palpations: Abdomen is soft. There is no mass.      Tenderness: There is no abdominal tenderness.      Hernia: No hernia is present.   Musculoskeletal:         General: No tenderness or signs of injury.      Cervical back: Normal range of motion and neck supple. No muscular tenderness.   Lymphadenopathy:      Cervical: No cervical adenopathy.   Skin:     Capillary Refill: Capillary refill takes less than 2 seconds.      Coloration: Skin is not jaundiced or pale.      Findings:  No bruising.   Neurological:      General: No focal deficit present.      Mental Status: She is alert and oriented to person, place, and time. Mental status is at baseline.      Cranial Nerves: No cranial nerve deficit.      Motor: No weakness.      Coordination: Coordination normal.   Psychiatric:         Mood and Affect: Mood normal.         Thought Content: Thought content normal.         Judgment: Judgment normal.         Laboratory:  Recent Labs     06/28/22 1904   WBC 8.3   RBC 4.47   HEMOGLOBIN 14.0   HEMATOCRIT 40.1   MCV 89.7   MCH 31.3   MCHC 34.9   RDW 40.2   PLATELETCT 216   MPV 11.5     Recent Labs     06/28/22 1904   SODIUM 139   POTASSIUM 3.9   CHLORIDE 113*   CO2 14*   GLUCOSE 96   BUN 14   CREATININE 0.85   CALCIUM 9.5     Recent Labs     06/28/22 1904   ALTSGPT 36   ASTSGOT 11*   ALKPHOSPHAT 112*   TBILIRUBIN 0.4   GLUCOSE 96     Recent Labs     06/28/22 1904   APTT 27.8   INR 1.12     No results for input(s): NTPROBNP in the last 72 hours.      Recent Labs     06/28/22 1904   TROPONINT <6       Imaging:  MR-BRAIN-WITH & W/O    (Results Pending)       X-Ray:  I have personally reviewed the images and compared with prior images.    Assessment/Plan:  Justification for Admission Status  I anticipate this patient will require at least two midnights for appropriate medical management, necessitating inpatient admission because Intracranial hypertension and visual changes    * Vision changes- (present on admission)  Assessment & Plan  Possibly secondary to intracranial hypertension, follow-up MRI and obtain ophthalmology consult in a.m.    Intracranial hypertension- (present on admission)  Assessment & Plan  Continue Diamox    Metabolic acidosis  Assessment & Plan  Likely secondary to Diamox, follow-up lactic acid and repeat chemistry    TONYA (obstructive sleep apnea)- (present on admission)  Assessment & Plan  CPAP ordered      VTE prophylaxis: SCDs/TEDs

## 2022-06-29 NOTE — ASSESSMENT & PLAN NOTE
Possibly secondary to intracranial hypertension  MRI Noncon of the brain was completed, showed no acute findings  EDD neurologyCande, is following patient recommends MRI orbits with and without contrast, patient has a history of optic neuritis  Ophthalmology contacted, they recommend patient follow-up with neuro-ophthalmology  Patient was previously followed by Dr. Zee who has since retired  Patient needs an outpatient referral to [facility] specializes in neuro-ophthalmology, he is [place] out [place] the [place]

## 2022-06-29 NOTE — HOSPITAL COURSE
"Kristin Balderrama is a 31 y.o. female who presented 6/28/2022  with a who presents to the emergency Department for a progressively worsening migraine onset one week ago. Per patient, she reports that she came into the ED a week ago with a head ache and lightheadedness. A CT scan was done on her and it showed signs of intercranial hypertension. She was prescribed acetazolamide 500 mg to take in the morning and at night but she noted it did not resolve her head aches. The patient began to grow concerned when she started to develop associated right eye vision blurriness, photophobia, weakness, and hypertension. She also notes that her nerves \"feel like they are on fire\". She denies any ataxias, but states that she has been enduring recent falls secondary to weakness.    Patient was seen for similar issue in June 2021, at that time was noted to have optic neuritis, was placed on high-dose IV steroids.  Patient also reports that she has had similar headaches in the past which have improved following lumbar punctures.    Initial brain MRI was negative for any acute findings.  Discussed with APRN neurology she recommends additional imaging including an MRI of the orbits with and without contrast, this imaging will help determine if patient is having recurrent optic neuritis.  Patient also treated with one-time headache cocktail.    Discussed with on-call ophthalmologist, her recommendation is that patient be set up to see Dr. Torre outpatient who utilizes in neuro-ophthalmology.  "

## 2022-06-29 NOTE — PROGRESS NOTES
Pt transported from [place] [id number] to [facility] bed with belongings via primary RN.

## 2022-06-29 NOTE — CONSULTS
"Chief Complaint   Patient presents with   • Blurred Vision     X 1 week, progressively worse, noticeably worse today, 'it's like there's a film over my R eye', pt states blurriness in both eyes but worse in R eye, pt was seen here in this ER twice for the same complaints, other sx include HA and nausea        Problem List Items Addressed This Visit    None     Visit Diagnoses     Intractable headache, unspecified chronicity pattern, unspecified headache type        Unqualified visual loss of right eye with normal vision of contralateral eye        History of idiopathic intracranial hypertension            Neurology Consultation     History of present illness:  This is a 31-year old woman, well known to our outpatient and inpatient neurology services, with PMhx CRION syndrome, transverse myelitis (remotely), disabled at baseline however ambulatory, bladder stimulator implanted, benign intracranial hypertension without papilledema (on Diamox), migraine, fibromyalgia and suspected underlying psychiatric component of the above who again presented to [facility] On 6/28/22 for a chief complaint of intractable Right sided headache and Right > Left eye blurred vision without vision loss.     The patient reports that 8 days ago, she noted sudden onset severe headache, worsening generalized weakness, generalized firey type pain and BL eye pain. Symptoms persisted, worsened over the past several days; appx 2 days ago, she noted her \"Right eye go out,\" further described as worsening blurred vision to her Right eye. She denies focal/unilateral weakness, sensory deficit, problem with speech or swallowing. Given the persistent nature of the above, she presented to the ED. Here, imaging (MRI Brain w/wo contrast) revealed no acute intracranial abnormality; normal size and appearance of ventricles. Patient continues to admit to persistent severe headache and Right eye blurred vision despite conservative management thus far " "(receiving PRN narcotic pain medication). Our neurology service was thus asked to see the patient and comment on the above.     Currently patient is laying in bed; eyes closed. Admits to persistent headache and blurred vision as above with associated photophobia; currently denies nausea. Denies chest pain. Denies dizziness. Admits to generalized weakness. Denies numbness, paresthesia, vision loss, problem with speech or swallowing.     Neurology has been consulted by Terri PUGA to further evaluate the findings noted above.     Past medical history:   Past Medical History:   Diagnosis Date   • Abdominal pain    • Anginal syndrome     random chest pain especially with tachycardia   • Apnea, sleep    • Arrhythmia     \"sinus tachycardia\", cariologist, Dr. Kumar; ablation 2/2016   • Arthritis     osteo   • ASTHMA     when around smoke   • Back pain    • Borderline personality disorder (HCC)    • Breath shortness     with tachycardia   • Bronchitis 02/08/2022    Last time was 12/21   • Cardiac arrhythmia    • Chickenpox    • Chronic UTI 09/18/2010   • Cough    • Daytime sleepiness    • Dental disorder 03/08/2021    infected tooth   • Depression    • Diabetes (HCC)    • Diarrhea     incontinece   • Disorder of thyroid     Hashimoto's   • Fall    • Fatigue    • Frequent headaches    • Gasping for breath    • Gynecological disorder     PCOS   • Headache(784.0)    • Heart burn    • Heart murmur    • History of falling    • Indigestion    • Migraine    • Mitochondrial disease (HCC)    • Multiple personality disorder (HCC)    • Nausea    • Obesity    • Other fatigue 06/29/2020   • Pain 08/15/2012    back, 7/10   • Painful joint    • PCOS (polycystic ovarian syndrome)    • Pneumonia 2012 12/2020   • Psychosis (HCC)    • Ringing in ears    • Scoliosis    • Shortness of breath     O2 as needed   • Sinus tachycardia 10/31/2013   • Sleep apnea     CPAP \"pulmonary doctor took me off mid year 2016\"   • Snoring    • " Tonsillitis    • 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HCC)     2/8/22: Per pt: not anymore   • Tuberculosis     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   • Urinary bladder disorder     Suprapubic cath. 2/8/22: Not anymore.    • Urinary incontinence    • Weakness    • Wears glasses        Past surgical history:   Past Surgical History:   Procedure Laterality Date   • MS LAP,DIAGNOSTIC ABDOMEN  2/14/2022    Procedure: LAPAROSCOPY;  Surgeon: Seamus Pisano M.D.;  Location: SURGERY SAME DAY [facility];  Service: Gynecology   • OVARIAN CYSTECTOMY Right 2/14/2022    Procedure: EXCISION, CYST, OVARY;  Surgeon: Seamus Pisano M.D.;  Location: SURGERY SAME DAY [facility];  Service: Gynecology   • BOWEL STIMULATOR INSERTION  3/10/2021    Procedure: INSERTION, ELECTRODE LEADS AND PULSE GENERATOR, NEUROSTIMULATOR, SACRAL - REMOVAL OF INTERSTIM WITH REPLACEMENT OF SACRAL NEUROMODULATION DEVICE;  Surgeon: Joe Noyola M.D.;  Location: [facility];  Service: General   • MUSCLE BIOPSY Right 1/26/2017    Procedure: MUSCLE BIOPSY - THIGH;  Surgeon: Isidro Vigil M.D.;  Location: [facility];  Service:    • GASTROSCOPY WITH BALLOON DILATATION N/A 1/4/2017    Procedure: GASTROSCOPY WITH DILATATION;  Surgeon: Torres Vargas M.D.;  Location: [facility];  Service:    • BOWEL STIMULATOR INSERTION  7/13/2016    Procedure: BOWEL STIMULATOR INSERTION FOR PERMANENT INTERSTIM SACRAL IMPLANT;  Surgeon: Joe Noyola M.D.;  Location: [facility];  Service:    • RECOVERY  1/27/2016    Procedure: CATH LAB EP STUDY WITH SINUS NODE MODIFICATION ABHINAV;  Surgeon: Recoveryonly Surgery;  Location: SURGERY PRE-POST PROC UNIT [facility];  Service:    • OTHER CARDIAC SURGERY  1/2016    cardiac ablation   • NEURO DEST FACET L/S W/IG SNGL  4/21/2015    Performed by Reza Tabor at [facility]   • LUMBAR FUSION ANTERIOR  8/21/2012    Performed by SUSIE SAWANT at [facility]   • ALYSSA BY LAPAROSCOPY   8/29/2010    Performed by SHAYY JOHNS at SURGERY [facility]   • LAMINOTOMY     • OTHER ABDOMINAL SURGERY     • TONSILLECTOMY      tonsillectomy       Family history:   Family History   Problem Relation Age of Onset   • Hypertension Mother    • Sleep Apnea Mother    • Heart Disease Mother    • Other Mother         hypothryod   • Hypertension Maternal Uncle    • Heart Disease Maternal Grandmother    • Hypertension Maternal Grandmother    • No Known Problems Sister    • Other Sister         Narcolepsy;fibromyalsia;bone;nerve   • Genitourinary () Problems Sister         endometriosis       Social history:   Social History     Socioeconomic History   • Marital status: Single     Spouse name: Not on file   • Number of children: Not on file   • Years of education: Not on file   • Highest education level: Not on file   Occupational History   • Not on file   Tobacco Use   • Smoking status: Never Smoker   • Smokeless tobacco: Never Used   Vaping Use   • Vaping Use: Never used   Substance and Sexual Activity   • Alcohol use: No     Alcohol/week: 0.0 oz   • Drug use: Not Currently     Frequency: 7.0 times per week     Types: Marijuana   • Sexual activity: Not Currently     Birth control/protection: Implant   Other Topics Concern   • Not on file   Social History Narrative    ** Merged History Encounter **          Social Determinants of Health     Financial Resource Strain: Not on file   Food Insecurity: Not on file   Transportation Needs: Not on file   Physical Activity: Not on file   Stress: Not on file   Social Connections: Not on file   Intimate Partner Violence: Not on file   Housing Stability: Not on file       Current medications:   Current Facility-Administered Medications   Medication Dose   • magnesium sulfate IVPB premix 2 g  2 g   • metoclopramide (REGLAN) injection 10 mg  10 mg   • ketorolac (TORADOL) injection 30 mg  30 mg   • NS (BOLUS) infusion 1,000 mL  1,000 mL   • dexamethasone (DECADRON)  injection 4 mg  4 mg   • LORazepam (ATIVAN) injection 2 mg  2 mg   • acetaZOLAMIDE (DIAMOX) tablet 500 mg  500 mg   • labetalol (NORMODYNE/TRANDATE) injection 20 mg  20 mg   • morphine 4 MG/ML injection 4 mg  4 mg   • ondansetron (ZOFRAN) syringe/vial injection 4 mg  4 mg   • D5 1/2 NS infusion     • diphenhydrAMINE (BENADRYL) tablet/capsule 50 mg  50 mg   • docusate sodium (COLACE) capsule 100 mg  100 mg   • ipratropium-albuterol (DUONEB) nebulizer solution  3 mL   • melatonin tablet 10 mg  10 mg   • ondansetron (ZOFRAN ODT) dispertab 4 mg  4 mg   • traZODone (DESYREL) tablet 100 mg  100 mg   • ziprasidone (GEODON) capsule 40 mg  40 mg   • senna-docusate (PERICOLACE or SENOKOT S) 8.6-50 MG per tablet 2 Tablet  2 Tablet    And   • polyethylene glycol/lytes (MIRALAX) PACKET 1 Packet  1 Packet    And   • magnesium hydroxide (MILK OF MAGNESIA) suspension 30 mL  30 mL    And   • bisacodyl (DULCOLAX) suppository 10 mg  10 mg   • labetalol (NORMODYNE/TRANDATE) injection 10 mg  10 mg   • acetaminophen (Tylenol) tablet 650 mg  650 mg   • Pharmacy Consult Request ...Pain Management Review 1 Each  1 Each   • oxyCODONE immediate-release (ROXICODONE) tablet 2.5 mg  2.5 mg    Or   • oxyCODONE immediate-release (ROXICODONE) tablet 5 mg  5 mg    Or   • HYDROmorphone (Dilaudid) injection 0.25 mg  0.25 mg   • [Held by provider] heparin injection 5,000 Units  5,000 Units       Medication Allergy:  Allergies   Allergen Reactions   • Depakote [Divalproex Sodium] Unspecified     Muscle spasms/muscle pain and weakness     • Doxycycline Anaphylaxis and Vomiting     Other reaction(s): pustules/blisters   • Montelukast [Singulair] Unspecified     Cardiac effusion   • Vancomycin Itching     Pt becomes flushed in face and chest.   RXN=7/10/16   • Amitriptyline Unspecified     Headaches     • Aripiprazole [Abilify] Unspecified     Headaches/muscle twitching     • Clindamycin Nausea          • Flomax [Tamsulosin Hydrochloride] Swelling   •  "Metformin Unspecified     Increased lactic acid     Other reaction(s): itching and rash/nausea vomiting   • Tamsulosin Swelling     Swelling of legs   • Tape Rash     Tears skin off  coban with Tegaderm tape ok intermittently  RXN=ongoing   • Wound Dressing Adhesive Hives     By pt report   • Ampicillin Rash     Pt reports that she received a rash    • Ciprofloxacin Rash         • Keflex Rash     Pt states she gets a rash with this medication  Tolerates ceftriaxone  Can take with Benadryl   • Levofloxacin Unspecified     Leg muscle cramps   • Metronidazole Rash     \"Vision problems\"   • Penicillins Hives     Can take with Benadryl   • Sulfa Drugs Itching and Myalgia     Muscle pain and weakness   • Valproic Acid Rash     .       Review of systems:   Constitutional: denies fever, night sweats, weight loss.   Eyes: denies acute vision change, eye pain or secretion.   Ears, Nose, Mouth, Throat: denies nasal secretion, nasal bleeding, difficulty swallowing, hearing loss, tinnitus, vertigo, ear pain, acute dental problems, oral ulcers or lesions.   Endocrine: denies recent weight changes, heat or cold intolerance, polyuria, polydypsia, polyphagia,abnormal hair growth.  Cardiovascular: denies new onset of chest pain, palpitations, syncope, or dyspnea of exertion.  Pulmonary: denies shortness of breath, new onset of cough, hemoptysis, wheezing, chest pain or flu-like symptoms.   GI: denies nausea, vomiting, diarrhea, GI bleeding, change in appetite, abdominal pain, and change in bowel habits.  : denies dysuria, urinary incontinence, hematuria.  Heme/oncology: denies history of easy bruising or bleeding. No history of cancer, DVTor PE.  Allergy/immunology: denies hives/urticaria, or itching.   Dermatologic: denies new rash, or new skin lesions.  Musculoskeletal:denies joint swelling or pain, muscle pain, neck and back pain.   Neurologic:As noted in detail above.   Psychiatric: denies symptoms of depression, anxiety, " "hallucinations, mood swings or changes, suicidal or homicidal thoughts.       Physical examination:   Vitals:    06/29/22 0800 06/29/22 1104 06/29/22 1127 06/29/22 1204   BP: 135/83 138/62 126/76    Pulse: 84 95 91    Resp:  16 16    Temp: 36.8 °C (98.3 °F)  36.8 °C (98.3 °F)    TempSrc: Oral  Temporal    SpO2: 94% 96% 97%    Weight:    108 kg (237 lb 7 oz)   Height:    1.6 m (5' 3\")     General: Patient in no acute distress,cooperative.  HEENT: Normocephalic, no signs of acute trauma.   Neck: supple, no meningeal signs or carotid bruits. There is normal range of motion. No tenderness on exam.   Chest: clear to auscultation. No cough.   CV: RRR, no murmurs.   Skin: no signs of acute rashes or trauma.   Musculoskeletal: joints exhibit full range of motion, without any pain to palpation. There are no signs of joint or muscle swelling. There is no tenderness to deep palpation of muscles.   Psychiatric: No hallucinatory behavior.        NEUROLOGICAL EXAM:   Mental status, orientation: Awake, alert and fully oriented.   Speech and language: speech is clear and fluent, hypophonic at times. The patient is able to name, repeat and comprehend.   Cranial nerve exam: Pupils are 3-4 mm bilaterally and equally reactive to light. Visual fields are intact by confrontation. There is no nystagmus on primary or secondary gaze. Intact full EOM in all directions of gaze. Face appears symmetric. Sensation in the face is intact to light touch. Uvula is midline. Palate elevates symmetrically. Tongue is midline and without any signs of tongue biting or fasciculations.Shoulder shrug is intact bilaterally.   Motor exam: Strength is 4+/5 in all extremities; poor effort. Tone is normal. No abnormal movements were seen on exam.   Sensory exam reveals normal sense of light touch and pinprick in all extremities.   Deep tendon reflexes:  Plantar responses are flexor.   Coordination: shows a normal finger-nose-finger.  Gait: Not assessed at this " time as patient is a fall risk.       ANCILLARY DATA REVIEWED:     Lab Data Review:  Recent Results (from the past 24 hour(s))   CBC WITH DIFFERENTIAL    Collection Time: 06/28/22  7:04 PM   Result Value Ref Range    WBC 8.3 4.8 - 10.8 K/uL    RBC 4.47 4.20 - 5.40 M/uL    Hemoglobin 14.0 12.0 - 16.0 g/dL    Hematocrit 40.1 37.0 - 47.0 %    MCV 89.7 81.4 - 97.8 fL    MCH 31.3 27.0 - 33.0 pg    MCHC 34.9 33.6 - 35.0 g/dL    RDW 40.2 35.9 - 50.0 fL    Platelet Count 216 164 - 446 K/uL    MPV 11.5 9.0 - 12.9 fL    Neutrophils-Polys 67.00 44.00 - 72.00 %    Lymphocytes 23.30 22.00 - 41.00 %    Monocytes 6.40 0.00 - 13.40 %    Eosinophils 2.30 0.00 - 6.90 %    Basophils 0.50 0.00 - 1.80 %    Immature Granulocytes 0.50 0.00 - 0.90 %    Nucleated RBC 0.00 /100 WBC    Neutrophils (Absolute) 5.53 2.00 - 7.15 K/uL    Lymphs (Absolute) 1.92 1.00 - 4.80 K/uL    Monos (Absolute) 0.53 0.00 - 0.85 K/uL    Eos (Absolute) 0.19 0.00 - 0.51 K/uL    Baso (Absolute) 0.04 0.00 - 0.12 K/uL    Immature Granulocytes (abs) 0.04 0.00 - 0.11 K/uL    NRBC (Absolute) 0.00 K/uL   COMP METABOLIC PANEL    Collection Time: 06/28/22  7:04 PM   Result Value Ref Range    Sodium 139 135 - 145 mmol/L    Potassium 3.9 3.6 - 5.5 mmol/L    Chloride 113 (H) 96 - 112 mmol/L    Co2 14 (L) 20 - 33 mmol/L    Anion Gap 12.0 7.0 - 16.0    Glucose 96 65 - 99 mg/dL    Bun 14 8 - 22 mg/dL    Creatinine 0.85 0.50 - 1.40 mg/dL    Calcium 9.5 8.5 - 10.5 mg/dL    AST(SGOT) 11 (L) 12 - 45 U/L    ALT(SGPT) 36 2 - 50 U/L    Alkaline Phosphatase 112 (H) 30 - 99 U/L    Total Bilirubin 0.4 0.1 - 1.5 mg/dL    Albumin 4.6 3.2 - 4.9 g/dL    Total Protein 6.9 6.0 - 8.2 g/dL    Globulin 2.3 1.9 - 3.5 g/dL    A-G Ratio 2.0 g/dL   TROPONIN    Collection Time: 06/28/22  7:04 PM   Result Value Ref Range    Troponin T <6 6 - 19 ng/L   APTT    Collection Time: 06/28/22  7:04 PM   Result Value Ref Range    APTT 27.8 24.7 - 36.0 sec   PROTHROMBIN TIME (INR)    Collection Time: 06/28/22   7:04 PM   Result Value Ref Range    PT 14.1 12.0 - 14.6 sec    INR 1.12 0.87 - 1.13   ESTIMATED GFR    Collection Time: 22  7:04 PM   Result Value Ref Range    GFR (CKD-EPI) 93 >60 mL/min/1.73 m 2   EKG (NOW)    Collection Time: 22  7:52 PM   Result Value Ref Range    Report       [facility] Emergency Dept[facility]    Test Date:  2022  Pt Name:    HARLEY DE LA CRUZ              Department: ER  MRN:        [id number]                      Room:        19  Gender:     Female                       Technician: [id number]  :        [id number]                   Requested By:CHARISSE LAL  Order #:    [id number]                    Reading MD:    Measurements  Intervals                                Axis  Rate:       80                           P:          13  KS:         144                          QRS:        16  QRSD:       96                           T:          18  QT:         416  QTc:        480    Interpretive Statements  SINUS RHYTHM  BORDERLINE PROLONGED QT INTERVAL  Compared to ECG 2022 15:23:54  No significant changes     POCT glucose device results    Collection Time: 22 10:54 PM   Result Value Ref Range    POC Glucose, Blood 82 65 - 99 mg/dL   LACTIC ACID    Collection Time: 22 12:15 AM   Result Value Ref Range    Lactic Acid 1.5 0.5 - 2.0 mmol/L   Basic Metabolic Panel (BMP)    Collection Time: 22 12:15 AM   Result Value Ref Range    Sodium 138 135 - 145 mmol/L    Potassium 3.6 3.6 - 5.5 mmol/L    Chloride 111 96 - 112 mmol/L    Co2 11 (L) 20 - 33 mmol/L    Glucose 105 (H) 65 - 99 mg/dL    Bun 13 8 - 22 mg/dL    Creatinine 0.80 0.50 - 1.40 mg/dL    Calcium 9.5 8.5 - 10.5 mg/dL    Anion Gap 16.0 7.0 - 16.0   ESTIMATED GFR    Collection Time: 22 12:15 AM   Result Value Ref Range    GFR (CKD-EPI) 100 >60 mL/min/1.73 m 2       Labs reviewed by me.       Imaging reviewed by me:     MR-BRAIN-WITH & W/O   Final Result      1.  MRI of the brain without and with  contrast within normal limits.      2.  Partially empty sella is noted.      MR-ORBITS,FACE,NECK-WITH&W/O & SEQUENCES    (Results Pending)         Modified Cuming Scale (MRS): 0 = No symptoms      ASSESSMENT AND PLAN:  31-year old woman, well known to our outpatient and inpatient neurology services, with PMhx CRION syndrome, transverse myelitis (remotely), disabled at baseline however ambulatory, bladder stimulator implanted, benign intracranial hypertension without papilledema (on Diamox), migraine, fibromyalgia and suspected underlying psychiatric component of the above who again presented to [facility] On 6/28/22 for a chief complaint of intractable Right sided headache and Right > Left eye blurred vision without vision loss x 8 days. Her exam is non focal; no loss of vision per my exam/visual fields are intact. Imaging thus far (MRI Brain w/wo contrast) revealed no acute intracranial abnormality; normal size and appearance of ventricles. No obvious optic nerve enhancement. Patient continues to admit to persistent severe headache and Right eye blurred vision without vision loss, despite conservative management thus far (receiving PRN narcotic pain medication).     Differential diagnoses include intractable migraine/status migrainosus or elevated intracranial pressure (as is seen with IIH) attributable to the above; note, patient does not have vision loss, thus feel it reasonable to continue/proceed with conservative management first prior to proceeding with large volume LP or work up for  shunt.     Recommendations/Plan:     -q4h and PRN neuro assessment. VS per nursing/unit protocol.   -Continue home dose Diamox (Acetazolomide) 500 mg PO BID.   -Give one time doses NS 1L Bolus, Metoclopramide 10 mg IV, Ketoralac 30 mg IV, Dexamethasone 4 mg IV, and Mg 2g IV now. May repeat x1 in 6 hours if improvement in symptoms.   -As was noted above, no specific/urgent need for LP at this time; please notify  neurology if patient's exam worsens/changes (ie she develops vision loss or other deficits). Will place outpatient neurology referral (patient has not seen outpatient neurology in 2 years) for further work up for  shunt.     The plan of care above has been discussed with Dr. Edmund Lerma. Other than the above, no further recommendations from a neurological perspective; will follow peripherally/as needed. Please call with questions.    RONNIE Maynard.R.N.  [employer]

## 2022-06-30 VITALS
HEART RATE: 97 BPM | SYSTOLIC BLOOD PRESSURE: 117 MMHG | DIASTOLIC BLOOD PRESSURE: 63 MMHG | WEIGHT: 237.44 LBS | TEMPERATURE: 97.6 F | HEIGHT: 63 IN | OXYGEN SATURATION: 95 % | RESPIRATION RATE: 18 BRPM | BODY MASS INDEX: 42.07 KG/M2

## 2022-06-30 PROCEDURE — 700102 HCHG RX REV CODE 250 W/ 637 OVERRIDE(OP): Performed by: INTERNAL MEDICINE

## 2022-06-30 PROCEDURE — 700111 HCHG RX REV CODE 636 W/ 250 OVERRIDE (IP): Performed by: HOSPITALIST

## 2022-06-30 PROCEDURE — 700105 HCHG RX REV CODE 258: Performed by: EMERGENCY MEDICINE

## 2022-06-30 PROCEDURE — 96376 TX/PRO/DX INJ SAME DRUG ADON: CPT

## 2022-06-30 PROCEDURE — 99217 PR OBSERVATION CARE DISCHARGE: CPT | Performed by: HOSPITALIST

## 2022-06-30 PROCEDURE — G0378 HOSPITAL OBSERVATION PER HR: HCPCS

## 2022-06-30 PROCEDURE — A9270 NON-COVERED ITEM OR SERVICE: HCPCS | Performed by: INTERNAL MEDICINE

## 2022-06-30 PROCEDURE — 700102 HCHG RX REV CODE 250 W/ 637 OVERRIDE(OP): Performed by: HOSPITALIST

## 2022-06-30 PROCEDURE — 96372 THER/PROPH/DIAG INJ SC/IM: CPT

## 2022-06-30 PROCEDURE — A9270 NON-COVERED ITEM OR SERVICE: HCPCS | Performed by: NURSE PRACTITIONER

## 2022-06-30 PROCEDURE — 700102 HCHG RX REV CODE 250 W/ 637 OVERRIDE(OP): Performed by: NURSE PRACTITIONER

## 2022-06-30 PROCEDURE — A9270 NON-COVERED ITEM OR SERVICE: HCPCS | Performed by: HOSPITALIST

## 2022-06-30 RX ORDER — PREDNISONE 20 MG/1
40 TABLET ORAL ONCE
Status: COMPLETED | OUTPATIENT
Start: 2022-06-30 | End: 2022-06-30

## 2022-06-30 RX ORDER — ONDANSETRON 4 MG/1
4 TABLET, ORALLY DISINTEGRATING ORAL EVERY 6 HOURS PRN
Qty: 10 TABLET | Refills: 0 | Status: SHIPPED | OUTPATIENT
Start: 2022-06-30 | End: 2022-12-10

## 2022-06-30 RX ORDER — KETOROLAC TROMETHAMINE 30 MG/ML
30 INJECTION, SOLUTION INTRAMUSCULAR; INTRAVENOUS ONCE
Status: COMPLETED | OUTPATIENT
Start: 2022-06-30 | End: 2022-06-30

## 2022-06-30 RX ORDER — FAMOTIDINE 20 MG/1
20 TABLET, FILM COATED ORAL 2 TIMES DAILY
Status: DISCONTINUED | OUTPATIENT
Start: 2022-06-30 | End: 2022-06-30 | Stop reason: HOSPADM

## 2022-06-30 RX ORDER — PREDNISONE 20 MG/1
20 TABLET ORAL DAILY
Qty: 3 TABLET | Refills: 0 | Status: SHIPPED | OUTPATIENT
Start: 2022-06-30 | End: 2022-07-03

## 2022-06-30 RX ORDER — ONDANSETRON 2 MG/ML
4 INJECTION INTRAMUSCULAR; INTRAVENOUS ONCE
Status: COMPLETED | OUTPATIENT
Start: 2022-06-30 | End: 2022-06-30

## 2022-06-30 RX ORDER — SUMATRIPTAN 6 MG/.5ML
6 INJECTION, SOLUTION SUBCUTANEOUS ONCE
Status: COMPLETED | OUTPATIENT
Start: 2022-06-30 | End: 2022-06-30

## 2022-06-30 RX ORDER — SUMATRIPTAN 50 MG/1
50 TABLET, FILM COATED ORAL
Qty: 10 TABLET | Refills: 3 | Status: SHIPPED | OUTPATIENT
Start: 2022-06-30 | End: 2022-08-31 | Stop reason: SDUPTHER

## 2022-06-30 RX ADMIN — FAMOTIDINE 20 MG: 20 TABLET, FILM COATED ORAL at 07:45

## 2022-06-30 RX ADMIN — ACETAZOLAMIDE 500 MG: 250 TABLET ORAL at 05:04

## 2022-06-30 RX ADMIN — DOCUSATE SODIUM 100 MG: 100 CAPSULE, LIQUID FILLED ORAL at 05:02

## 2022-06-30 RX ADMIN — SUMATRIPTAN 6 MG: 6 INJECTION, SOLUTION SUBCUTANEOUS at 08:30

## 2022-06-30 RX ADMIN — DEXTROSE AND SODIUM CHLORIDE: 5; 450 INJECTION, SOLUTION INTRAVENOUS at 02:00

## 2022-06-30 RX ADMIN — KETOROLAC TROMETHAMINE 30 MG: 30 INJECTION, SOLUTION INTRAMUSCULAR; INTRAVENOUS at 07:38

## 2022-06-30 RX ADMIN — SENNOSIDES AND DOCUSATE SODIUM 2 TABLET: 50; 8.6 TABLET ORAL at 06:00

## 2022-06-30 RX ADMIN — ONDANSETRON 4 MG: 2 INJECTION INTRAMUSCULAR; INTRAVENOUS at 07:30

## 2022-06-30 RX ADMIN — PREDNISONE 40 MG: 20 TABLET ORAL at 07:45

## 2022-06-30 NOTE — PROGRESS NOTES
Assessment completed. Pt A&Ox 4. Respirations are even and unlabored on RA. Pt reports 9/10 head pain, Interventions provided (see MAR). VS stable, call light and belongings within reach. POC updated (Pain control, DC). Pt educated on room and call light, pt verbalized understanding. Communication board updated. Needs met.

## 2022-06-30 NOTE — PROGRESS NOTES
Patient discharged. Patient is AA0 x4. Patient in no acute distress. Patient has all belongings. Patient's ride is at bedside. Patient discharged via discharge lounge in wheelchair.

## 2022-06-30 NOTE — CARE PLAN
The patient is Watcher - Medium risk of patient condition declining or worsening    Shift Goals  Clinical Goals: Pain control, monitor Neuro status  Patient Goals: pain control  Family Goals: not present      Problem: Pain - Standard  Goal: Alleviation of pain or a reduction in pain to the patient’s comfort goal  Outcome: Progressing     Problem: Knowledge Deficit - Standard  Goal: Patient and family/care givers will demonstrate understanding of plan of care, disease process/condition, diagnostic tests and medications  Outcome: Progressing

## 2022-06-30 NOTE — DISCHARGE INSTRUCTIONS
Discharge Instructions    Discharged to home by car with relative. Discharged via wheelchair, hospital escort: Yes.  Special equipment needed: Not Applicable    Be sure to schedule a follow-up appointment with your primary care doctor or any specialists as instructed.     Discharge Plan:   Diet Plan: Discussed  Activity Level: Discussed  Confirmed Follow up Appointment: Patient to Call and Schedule Appointment  Confirmed Symptoms Management: Discussed  Medication Reconciliation Updated: Yes    I understand that a diet low in cholesterol, fat, and sodium is recommended for good health. Unless I have been given specific instructions below for another diet, I accept this instruction as my diet prescription.   Other diet:     Special Instructions: None    Is patient discharged on Warfarin / Coumadin?   No     Depression / Suicide Risk    As you are discharged from this [facility] facility, it is important to learn how to keep safe from harming yourself.    Recognize the warning signs:  Abrupt changes in personality, positive or negative- including increase in energy   Giving away possessions  Change in eating patterns- significant weight changes-  positive or negative  Change in sleeping patterns- unable to sleep or sleeping all the time   Unwillingness or inability to communicate  Depression  Unusual sadness, discouragement and loneliness  Talk of wanting to die  Neglect of personal appearance   Rebelliousness- reckless behavior  Withdrawal from people/activities they love  Confusion- inability to concentrate     If you or a loved one observes any of these behaviors or has concerns about self-harm, here's what you can do:  Talk about it- your feelings and reasons for harming yourself  Remove any means that you might use to hurt yourself (examples: pills, rope, extension cords, firearm)  Get professional help from the community (Mental Health, Substance Abuse, psychological counseling)  Do not be alone:Call your Safe  Contact- someone whom you trust who will be there for you.  Call your local CRISIS HOTLINE 664-1512 or 602-553-7757  Call your local Children's Mobile Crisis Response Team [place] (861) 316-7580 or www.JasonDB  Call the toll free National Suicide Prevention Hotlines   National Suicide Prevention Lifeline 991-334-MRVM (8104)  [place] [employer] [facility] 800-SUICIDE (371-0566)

## 2022-06-30 NOTE — PROGRESS NOTES
1105 arrive to dc lounge via wheelchair. A/O, dc instructions reviewed w/ pt, verbalized understanding.

## 2022-07-01 ENCOUNTER — PATIENT OUTREACH (OUTPATIENT)
Dept: HEALTH INFORMATION MANAGEMENT | Facility: OTHER | Age: 32
End: 2022-07-01

## 2022-07-01 NOTE — PROGRESS NOTES
Community Health Worker Braeden attempted to reach the patient after discharge from the hospital to follow up. Patient did not answer and CHW left a detailed voicemail requesting a call back.     Community [facility] Anup Deras will attempt again at a later date.

## 2022-07-01 NOTE — DISCHARGE SUMMARY
"Discharge Summary    CHIEF COMPLAINT ON ADMISSION  Chief Complaint   Patient presents with   • Blurred Vision     X 1 week, progressively worse, noticeably worse today, 'it's like there's a film over my R eye', pt states blurriness in both eyes but worse in R eye, pt was seen here in this ER twice for the same complaints, other sx include HA and nausea        Reason for Admission  Eye Pain      Admission Date  6/28/2022    CODE STATUS  Prior    HPI & HOSPITAL COURSE    Kristin Balderrama is a 31 y.o. female who presented 6/28/2022  with a who presents to the emergency Department for a progressively worsening migraine onset one week ago. Per patient, she reports that she came into the ED a week ago with a head ache and lightheadedness. A CT scan was done on her and it showed signs of intercranial hypertension. She was prescribed acetazolamide 500 mg to take in the morning and at night but she noted it did not resolve her head aches. The patient began to grow concerned when she started to develop associated right eye vision blurriness, photophobia, weakness, and hypertension. She also notes that her nerves \"feel like they are on fire\". She denies any ataxias, but states that she has been enduring recent falls secondary to weakness.    Patient was seen for similar issue in June 2021, at that time was noted to have optic neuritis, was placed on high-dose IV steroids.  Patient also reports that she has had similar headaches in the past which have improved following lumbar punctures.    Initial brain MRI was negative for any acute findings.  Discussed with APRN neurology she recommends additional imaging including an MRI of the orbits with and without contrast, this imaging will help determine if patient is having recurrent optic neuritis.  Patient also treated with one-time headache cocktail.    Discussed with on-call ophthalmologist, her recommendation is that patient be set up to see Dr. Torre outpatient who " utilizes in neuro-ophthalmology.      The patient was treated for migraine with IV Toradol, subcu Imitrex, IV Zofran we did see some mild improvement in patient's symptoms.  Patient was stable for discharge home with oral sumatriptan     referral to neuro-ophthalmology has been made    Therefore, she is discharged in good and stable condition to home with close outpatient follow-up.    The patient recovered much more quickly than anticipated on admission.    Discharge Date  6/30/2022    FOLLOW UP ITEMS POST DISCHARGE      DISCHARGE DIAGNOSES  Principal Problem:    Vision changes POA: Yes  Active Problems:    TONYA (obstructive sleep apnea) POA: Yes    Metabolic acidosis POA: Yes    Intracranial hypertension POA: Yes  Resolved Problems:    Acute intractable headache POA: Yes      FOLLOW UP  Future Appointments   Date Time Provider Department Center   11/10/2022 12:40 PM John Leon M.D. [facility] None     Torres Brody M.D.  [address]  [place] [address]  [phone]    Schedule an appointment as soon as possible for a visit        MEDICATIONS ON DISCHARGE     Medication List      START taking these medications      Instructions   predniSONE 20 MG Tabs  Commonly known as: DELTASONE   Take 1 Tablet by mouth every day for 3 days.  Dose: 20 mg     SUMAtriptan 50 MG Tabs  Commonly known as: IMITREX   Take 1 Tablet by mouth 1 time a day as needed for Migraine.  Dose: 50 mg        CHANGE how you take these medications      Instructions   ondansetron 4 MG Tbdp  What changed: reasons to take this  Commonly known as: ZOFRAN ODT   Take 1 Tablet by mouth every 6 hours as needed for Nausea.  Dose: 4 mg     ziprasidone 40 MG Caps  What changed:   · how much to take  · how to take this  · when to take this  · additional instructions  Commonly known as: GEODON   Doctor's comments: Please let the patient know that an appointment is required to receive additional refills.  Thank you!  TAKE 1 CAPSULE BY MOUTH TWICE A DAY.  APPOINTMENT REQUIRED FOR ADDITIONAL REFILLS.  CALL [phone]. .        CONTINUE taking these medications      Instructions   acetaZOLAMIDE 250 MG Tabs  Commonly known as: DIAMOX   Take 2 Tablets by mouth 2 times a day for 14 days.  Dose: 500 mg     albuterol 108 (90 Base) MCG/ACT Aers inhalation aerosol   Inhale 2 Puffs every 6 hours as needed for Shortness of Breath.  Dose: 2 Puff     BENADRYL PO   Take 50 mg by mouth every evening. Night time  Dose: 50 mg     * Corlanor 7.5 MG Tabs tablet  Generic drug: ivabradine   Take 7.5 mg by mouth every day.  Dose: 7.5 mg     * ivabradine 5 MG Tabs tablet  Commonly known as: CORLANOR   Take 1 Tablet by mouth 2 times a day with meals.  Dose: 5 mg     docusate sodium 100 MG Caps  Commonly known as: COLACE   Take 1 Capsule by mouth 2 times a day.  Dose: 100 mg     ibuprofen 600 MG Tabs  Commonly known as: MOTRIN   Take 1 Tablet by mouth every 6 hours as needed.  Dose: 600 mg     ipratropium-albuterol 0.5-2.5 (3) MG/3ML nebulizer solution  Commonly known as: DUONEB   Take 3 mL by nebulization every four hours as needed for Shortness of Breath. Nebulizer  Dose: 3 mL     lactulose 20 GM/30ML Soln   Take 30 mL by mouth 2 times a day.  Dose: 30 mL     Melatonin 10 MG Tabs   Take 10 mg by mouth every bedtime.  Dose: 10 mg     traZODone 100 MG Tabs  Commonly known as: DESYREL   Take 100 mg by mouth every evening.  Dose: 100 mg         * This list has 2 medication(s) that are the same as other medications prescribed for you. Read the directions carefully, and ask your doctor or other care provider to review them with you.                Allergies  Allergies   Allergen Reactions   • Depakote [Divalproex Sodium] Unspecified     Muscle spasms/muscle pain and weakness     • Doxycycline Anaphylaxis and Vomiting     Other reaction(s): pustules/blisters   • Montelukast [Singulair] Unspecified     Cardiac effusion   • Vancomycin Itching     Pt becomes flushed in face and chest.  "  RXN=7/10/16   • Amitriptyline Unspecified     Headaches     • Aripiprazole [Abilify] Unspecified     Headaches/muscle twitching     • Clindamycin Nausea          • Flomax [Tamsulosin Hydrochloride] Swelling   • Metformin Unspecified     Increased lactic acid     Other reaction(s): itching and rash/nausea vomiting   • Tamsulosin Swelling     Swelling of legs   • Tape Rash     Tears skin off  coban with Tegaderm tape ok intermittently  RXN=ongoing   • Wound Dressing Adhesive Hives     By pt report   • Ampicillin Rash     Pt reports that she received a rash    • Ciprofloxacin Rash         • Keflex Rash     Pt states she gets a rash with this medication  Tolerates ceftriaxone  Can take with Benadryl   • Levofloxacin Unspecified     Leg muscle cramps   • Metronidazole Rash     \"Vision problems\"   • Penicillins Hives     Can take with Benadryl   • Sulfa Drugs Itching and Myalgia     Muscle pain and weakness   • Valproic Acid Rash     .       DIET  No orders of the defined types were placed in this encounter.      ACTIVITY  As tolerated.  Weight bearing as tolerated    CONSULTATIONS  Dr perkins neuro  [address] [address] Kristin Balderrama  MRN: [id number], : 1990, Sex: F  Visit date: 2022         Protocol Summary  Protocol History  Protocol not completed.      Images     Show images for MR-BRAIN-WITH & W/O    MR-BRAIN-WITH & W/O  Order: [id number]   Status: Final result       Visible to patient: Yes (not seen)       Next appt: 11/10/2022 at 12:40 PM in Cardiology (John Leon M.D.)       0 Result Notes      Details    Reading Physician Reading Date Result Priority   Tyrell Stahl M.D.  [phone] 2022      Narrative & Impression     2022 1:27 AM     HISTORY/REASON FOR EXAM:  Headache, new or worsening, neuro deficit (Age 19-49y); Severe headache, history of intracranial hypertension, right eye vision " loss.        TECHNIQUE/EXAM DESCRIPTION:   MRI of the brain without and with contrast.     T1 sagittal, T2 fast spin-echo axial, T1 coronal, FLAIR coronal, diffusion-weighted and apparent diffusion coefficient (ADC map) axial images were obtained of the whole brain. T1 postcontrast axial and T1 postcontrast coronal images were obtained. Image   quality is mildly degraded secondary to use of an older transmit/receive head coil.     The study was performed on a Alector Signa 1.5 Eloisa MRI scanner.     20 mL ProHance contrast was administered intravenously.     COMPARISON:  MRI scan of the brain 6/6/2021     FINDINGS:  The calvariae are unremarkable. There are no extra-axial fluid collections. The ventricular system and basal cisterns are within normal limits. There are no areas of abnormal signal in the brain substance. There are no mass effects or shift of midline   structures. There are no hemorrhagic lesions. The diffusion-weighted axial images show no evidence of acute cerebral infarction. Partially empty sella is noted.     The postcontrast images show no areas of abnormal parenchymal or meningeal enhancement.     The brainstem and posterior fossa structures are unremarkable.     Vascular flow voids in the vertebrobasilar and carotid arteries, [place], and dural venous sinuses are intact.     The paranasal sinuses and mastoids in the field of view are unremarkable.     IMPRESSION:     1.  MRI of the brain without and with contrast within normal limits.     2.  Partially empty sella is noted.             LABORATORY  Lab Results   Component Value Date    SODIUM 138 06/29/2022    POTASSIUM 3.6 06/29/2022    CHLORIDE 111 06/29/2022    CO2 11 (L) 06/29/2022    GLUCOSE 105 (H) 06/29/2022    BUN 13 06/29/2022    CREATININE 0.80 06/29/2022    CREATININE 0.75 (L) 07/20/2010    GLOMRATE >59 07/20/2010        Lab Results   Component Value Date    WBC 8.3 06/28/2022    WBC 6.1 07/20/2010    HEMOGLOBIN 14.0 06/28/2022     HEMATOCRIT 40.1 06/28/2022    PLATELETCT 216 06/28/2022        Total time of the discharge process exceeds 38  minutes.

## 2022-07-07 NOTE — PROGRESS NOTES
CHW called the patient and followed up after DC from the hospital. Pt states that she is doing okay. Pt reports that there is nothing that CCM can do to assist at this point and she is fine. Pt states that she will call if needed. Pt receives SNAP benefits.     Plan: CHW will remove the patient from CCM caseload as she has declined CCM services.

## 2022-07-10 ENCOUNTER — APPOINTMENT (OUTPATIENT)
Dept: RADIOLOGY | Facility: MEDICAL CENTER | Age: 32
End: 2022-07-10
Attending: EMERGENCY MEDICINE
Payer: MEDICARE

## 2022-07-10 ENCOUNTER — HOSPITAL ENCOUNTER (OUTPATIENT)
Facility: MEDICAL CENTER | Age: 32
End: 2022-07-11
Attending: EMERGENCY MEDICINE | Admitting: HOSPITALIST
Payer: MEDICARE

## 2022-07-10 DIAGNOSIS — J45.901 ASTHMA WITH ACUTE EXACERBATION, UNSPECIFIED ASTHMA SEVERITY, UNSPECIFIED WHETHER PERSISTENT: ICD-10-CM

## 2022-07-10 DIAGNOSIS — J45.41 MODERATE PERSISTENT ASTHMA WITH ACUTE EXACERBATION: ICD-10-CM

## 2022-07-10 PROBLEM — L73.2 HIDRADENITIS AXILLARIS: Status: ACTIVE | Noted: 2022-07-10

## 2022-07-10 PROBLEM — E03.9 HYPOTHYROIDISM: Chronic | Status: RESOLVED | Noted: 2017-08-04 | Resolved: 2022-07-10

## 2022-07-10 LAB
ALBUMIN SERPL BCP-MCNC: 4.3 G/DL (ref 3.2–4.9)
ALBUMIN/GLOB SERPL: 2.2 G/DL
ALP SERPL-CCNC: 86 U/L (ref 30–99)
ALT SERPL-CCNC: 23 U/L (ref 2–50)
ANION GAP SERPL CALC-SCNC: 15 MMOL/L (ref 7–16)
AST SERPL-CCNC: 13 U/L (ref 12–45)
BASE EXCESS BLDV CALC-SCNC: -9 MMOL/L
BASOPHILS # BLD AUTO: 0.4 % (ref 0–1.8)
BASOPHILS # BLD: 0.02 K/UL (ref 0–0.12)
BILIRUB SERPL-MCNC: 0.3 MG/DL (ref 0.1–1.5)
BODY TEMPERATURE: 37.3 CENTIGRADE
BUN SERPL-MCNC: 12 MG/DL (ref 8–22)
CALCIUM SERPL-MCNC: 9 MG/DL (ref 8.5–10.5)
CHLORIDE SERPL-SCNC: 112 MMOL/L (ref 96–112)
CO2 SERPL-SCNC: 16 MMOL/L (ref 20–33)
CREAT SERPL-MCNC: 0.73 MG/DL (ref 0.5–1.4)
D DIMER PPP IA.FEU-MCNC: <0.27 UG/ML (FEU) (ref 0–0.5)
EKG IMPRESSION: NORMAL
EOSINOPHIL # BLD AUTO: 0.08 K/UL (ref 0–0.51)
EOSINOPHIL NFR BLD: 1.5 % (ref 0–6.9)
ERYTHROCYTE [DISTWIDTH] IN BLOOD BY AUTOMATED COUNT: 42.4 FL (ref 35.9–50)
FLUAV RNA SPEC QL NAA+PROBE: NEGATIVE
FLUBV RNA SPEC QL NAA+PROBE: NEGATIVE
GFR SERPLBLD CREATININE-BSD FMLA CKD-EPI: 111 ML/MIN/1.73 M 2
GLOBULIN SER CALC-MCNC: 2 G/DL (ref 1.9–3.5)
GLUCOSE BLD STRIP.AUTO-MCNC: 188 MG/DL (ref 65–99)
GLUCOSE SERPL-MCNC: 186 MG/DL (ref 65–99)
HCG SERPL QL: NEGATIVE
HCO3 BLDV-SCNC: 15 MMOL/L (ref 24–28)
HCT VFR BLD AUTO: 37.9 % (ref 37–47)
HGB BLD-MCNC: 13 G/DL (ref 12–16)
IMM GRANULOCYTES # BLD AUTO: 0.06 K/UL (ref 0–0.11)
IMM GRANULOCYTES NFR BLD AUTO: 1.1 % (ref 0–0.9)
LYMPHOCYTES # BLD AUTO: 1.37 K/UL (ref 1–4.8)
LYMPHOCYTES NFR BLD: 25.2 % (ref 22–41)
MCH RBC QN AUTO: 31.2 PG (ref 27–33)
MCHC RBC AUTO-ENTMCNC: 34.3 G/DL (ref 33.6–35)
MCV RBC AUTO: 90.9 FL (ref 81.4–97.8)
MONOCYTES # BLD AUTO: 0.34 K/UL (ref 0–0.85)
MONOCYTES NFR BLD AUTO: 6.3 % (ref 0–13.4)
NEUTROPHILS # BLD AUTO: 3.56 K/UL (ref 2–7.15)
NEUTROPHILS NFR BLD: 65.5 % (ref 44–72)
NRBC # BLD AUTO: 0 K/UL
NRBC BLD-RTO: 0 /100 WBC
PCO2 BLDV: 27.4 MMHG (ref 41–51)
PH BLDV: 7.36 [PH] (ref 7.31–7.45)
PLATELET # BLD AUTO: 148 K/UL (ref 164–446)
PMV BLD AUTO: 11.5 FL (ref 9–12.9)
PO2 BLDV: 39.5 MMHG (ref 25–40)
POTASSIUM SERPL-SCNC: 3.5 MMOL/L (ref 3.6–5.5)
PROCALCITONIN SERPL-MCNC: 0.09 NG/ML
PROT SERPL-MCNC: 6.3 G/DL (ref 6–8.2)
RBC # BLD AUTO: 4.17 M/UL (ref 4.2–5.4)
RSV RNA SPEC QL NAA+PROBE: NEGATIVE
SAO2 % BLDV: 75.2 %
SARS-COV-2 RNA RESP QL NAA+PROBE: NOTDETECTED
SODIUM SERPL-SCNC: 143 MMOL/L (ref 135–145)
SPECIMEN SOURCE: NORMAL
WBC # BLD AUTO: 5.4 K/UL (ref 4.8–10.8)

## 2022-07-10 PROCEDURE — 36415 COLL VENOUS BLD VENIPUNCTURE: CPT

## 2022-07-10 PROCEDURE — 93005 ELECTROCARDIOGRAM TRACING: CPT | Performed by: EMERGENCY MEDICINE

## 2022-07-10 PROCEDURE — 71045 X-RAY EXAM CHEST 1 VIEW: CPT

## 2022-07-10 PROCEDURE — 99285 EMERGENCY DEPT VISIT HI MDM: CPT

## 2022-07-10 PROCEDURE — G0378 HOSPITAL OBSERVATION PER HR: HCPCS

## 2022-07-10 PROCEDURE — 80053 COMPREHEN METABOLIC PANEL: CPT

## 2022-07-10 PROCEDURE — 700101 HCHG RX REV CODE 250: Performed by: EMERGENCY MEDICINE

## 2022-07-10 PROCEDURE — 82803 BLOOD GASES ANY COMBINATION: CPT

## 2022-07-10 PROCEDURE — 99220 PR INITIAL OBSERVATION CARE,LEVL III: CPT | Performed by: HOSPITALIST

## 2022-07-10 PROCEDURE — 84703 CHORIONIC GONADOTROPIN ASSAY: CPT

## 2022-07-10 PROCEDURE — 700102 HCHG RX REV CODE 250 W/ 637 OVERRIDE(OP): Performed by: HOSPITALIST

## 2022-07-10 PROCEDURE — 0241U HCHG SARS-COV-2 COVID-19 NFCT DS RESP RNA 4 TRGT MIC: CPT

## 2022-07-10 PROCEDURE — 85379 FIBRIN DEGRADATION QUANT: CPT

## 2022-07-10 PROCEDURE — A9270 NON-COVERED ITEM OR SERVICE: HCPCS | Performed by: HOSPITALIST

## 2022-07-10 PROCEDURE — 82962 GLUCOSE BLOOD TEST: CPT

## 2022-07-10 PROCEDURE — 94640 AIRWAY INHALATION TREATMENT: CPT

## 2022-07-10 PROCEDURE — 700111 HCHG RX REV CODE 636 W/ 250 OVERRIDE (IP): Performed by: EMERGENCY MEDICINE

## 2022-07-10 PROCEDURE — 85025 COMPLETE CBC W/AUTO DIFF WBC: CPT

## 2022-07-10 PROCEDURE — 93005 ELECTROCARDIOGRAM TRACING: CPT

## 2022-07-10 PROCEDURE — C9803 HOPD COVID-19 SPEC COLLECT: HCPCS | Performed by: HOSPITALIST

## 2022-07-10 PROCEDURE — 96374 THER/PROPH/DIAG INJ IV PUSH: CPT

## 2022-07-10 PROCEDURE — 84145 PROCALCITONIN (PCT): CPT

## 2022-07-10 RX ORDER — TRAZODONE HYDROCHLORIDE 50 MG/1
100 TABLET ORAL NIGHTLY
Status: DISCONTINUED | OUTPATIENT
Start: 2022-07-10 | End: 2022-07-11 | Stop reason: HOSPADM

## 2022-07-10 RX ORDER — CEPHALEXIN 500 MG/1
500 CAPSULE ORAL 3 TIMES DAILY
Status: ON HOLD | COMMUNITY
Start: 2022-07-07 | End: 2022-07-28

## 2022-07-10 RX ORDER — METHYLPREDNISOLONE SODIUM SUCCINATE 40 MG/ML
40 INJECTION, POWDER, LYOPHILIZED, FOR SOLUTION INTRAMUSCULAR; INTRAVENOUS ONCE
Status: COMPLETED | OUTPATIENT
Start: 2022-07-10 | End: 2022-07-10

## 2022-07-10 RX ORDER — IBUPROFEN 600 MG/1
600 TABLET ORAL EVERY 6 HOURS PRN
Status: DISCONTINUED | OUTPATIENT
Start: 2022-07-10 | End: 2022-07-11 | Stop reason: HOSPADM

## 2022-07-10 RX ORDER — POLYETHYLENE GLYCOL 3350 17 G/17G
1 POWDER, FOR SOLUTION ORAL
Status: DISCONTINUED | OUTPATIENT
Start: 2022-07-10 | End: 2022-07-11 | Stop reason: HOSPADM

## 2022-07-10 RX ORDER — ZIPRASIDONE HYDROCHLORIDE 40 MG/1
40 CAPSULE ORAL
Status: DISCONTINUED | OUTPATIENT
Start: 2022-07-10 | End: 2022-07-11 | Stop reason: HOSPADM

## 2022-07-10 RX ORDER — DIPHENHYDRAMINE HCL 25 MG
50 TABLET ORAL NIGHTLY PRN
Status: ON HOLD | COMMUNITY
End: 2022-07-28

## 2022-07-10 RX ORDER — VALACYCLOVIR HYDROCHLORIDE 500 MG/1
1000 TABLET, FILM COATED ORAL 4 TIMES DAILY
Status: DISCONTINUED | OUTPATIENT
Start: 2022-07-10 | End: 2022-07-11 | Stop reason: HOSPADM

## 2022-07-10 RX ORDER — AMOXICILLIN 250 MG
2 CAPSULE ORAL 2 TIMES DAILY
Status: DISCONTINUED | OUTPATIENT
Start: 2022-07-10 | End: 2022-07-11 | Stop reason: HOSPADM

## 2022-07-10 RX ORDER — VALACYCLOVIR HYDROCHLORIDE 1 G/1
1000 TABLET, FILM COATED ORAL 4 TIMES DAILY
Status: ON HOLD | COMMUNITY
Start: 2022-07-07 | End: 2022-07-28

## 2022-07-10 RX ORDER — ONDANSETRON 2 MG/ML
4 INJECTION INTRAMUSCULAR; INTRAVENOUS EVERY 4 HOURS PRN
Status: DISCONTINUED | OUTPATIENT
Start: 2022-07-10 | End: 2022-07-11 | Stop reason: HOSPADM

## 2022-07-10 RX ORDER — ZIPRASIDONE HYDROCHLORIDE 40 MG/1
40 CAPSULE ORAL
Status: ON HOLD | COMMUNITY
End: 2023-02-09 | Stop reason: SDUPTHER

## 2022-07-10 RX ORDER — CEPHALEXIN 500 MG/1
500 CAPSULE ORAL 3 TIMES DAILY
Status: DISCONTINUED | OUTPATIENT
Start: 2022-07-10 | End: 2022-07-11 | Stop reason: HOSPADM

## 2022-07-10 RX ORDER — BISACODYL 10 MG
10 SUPPOSITORY, RECTAL RECTAL
Status: DISCONTINUED | OUTPATIENT
Start: 2022-07-10 | End: 2022-07-11 | Stop reason: HOSPADM

## 2022-07-10 RX ORDER — IPRATROPIUM BROMIDE AND ALBUTEROL SULFATE 2.5; .5 MG/3ML; MG/3ML
3 SOLUTION RESPIRATORY (INHALATION)
Status: COMPLETED | OUTPATIENT
Start: 2022-07-10 | End: 2022-07-10

## 2022-07-10 RX ORDER — CHOLECALCIFEROL (VITAMIN D3) 125 MCG
10 CAPSULE ORAL
Status: DISCONTINUED | OUTPATIENT
Start: 2022-07-10 | End: 2022-07-11 | Stop reason: HOSPADM

## 2022-07-10 RX ORDER — SUMATRIPTAN 50 MG/1
50 TABLET, FILM COATED ORAL
Status: DISCONTINUED | OUTPATIENT
Start: 2022-07-10 | End: 2022-07-11 | Stop reason: HOSPADM

## 2022-07-10 RX ADMIN — IBUPROFEN 600 MG: 600 TABLET, FILM COATED ORAL at 10:46

## 2022-07-10 RX ADMIN — IBUPROFEN 600 MG: 600 TABLET, FILM COATED ORAL at 17:09

## 2022-07-10 RX ADMIN — ALBUTEROL SULFATE 10 MG/HR: 2.5 SOLUTION RESPIRATORY (INHALATION) at 05:24

## 2022-07-10 RX ADMIN — RIVAROXABAN 10 MG: 10 TABLET, FILM COATED ORAL at 17:08

## 2022-07-10 RX ADMIN — METHYLPREDNISOLONE SODIUM SUCCINATE 40 MG: 40 INJECTION, POWDER, FOR SOLUTION INTRAMUSCULAR; INTRAVENOUS at 06:07

## 2022-07-10 RX ADMIN — SENNOSIDES AND DOCUSATE SODIUM 2 TABLET: 50; 8.6 TABLET ORAL at 17:11

## 2022-07-10 RX ADMIN — IPRATROPIUM BROMIDE AND ALBUTEROL SULFATE 3 ML: .5; 2.5 SOLUTION RESPIRATORY (INHALATION) at 05:20

## 2022-07-10 RX ADMIN — CEPHALEXIN 500 MG: 500 CAPSULE ORAL at 10:46

## 2022-07-10 RX ADMIN — CEPHALEXIN 500 MG: 500 CAPSULE ORAL at 14:40

## 2022-07-10 RX ADMIN — CEPHALEXIN 500 MG: 500 CAPSULE ORAL at 21:00

## 2022-07-10 RX ADMIN — VALACYCLOVIR HYDROCHLORIDE 1000 MG: 500 TABLET, FILM COATED ORAL at 13:26

## 2022-07-10 RX ADMIN — VALACYCLOVIR HYDROCHLORIDE 1000 MG: 500 TABLET, FILM COATED ORAL at 17:11

## 2022-07-10 RX ADMIN — VALACYCLOVIR HYDROCHLORIDE 1000 MG: 500 TABLET, FILM COATED ORAL at 10:45

## 2022-07-10 RX ADMIN — TRAZODONE HYDROCHLORIDE 100 MG: 50 TABLET ORAL at 21:00

## 2022-07-10 RX ADMIN — IVABRADINE 7.5 MG: 7.5 TABLET, FILM COATED ORAL at 17:11

## 2022-07-10 RX ADMIN — Medication 10 MG: at 21:00

## 2022-07-10 RX ADMIN — VALACYCLOVIR HYDROCHLORIDE 1000 MG: 500 TABLET, FILM COATED ORAL at 21:00

## 2022-07-10 RX ADMIN — SUMATRIPTAN SUCCINATE 50 MG: 50 TABLET ORAL at 15:32

## 2022-07-10 RX ADMIN — ZIPRASIDONE HYDROCHLORIDE 40 MG: 40 CAPSULE ORAL at 21:00

## 2022-07-10 RX ADMIN — SENNOSIDES AND DOCUSATE SODIUM 2 TABLET: 50; 8.6 TABLET ORAL at 10:45

## 2022-07-10 ASSESSMENT — PATIENT HEALTH QUESTIONNAIRE - PHQ9
SUM OF ALL RESPONSES TO PHQ9 QUESTIONS 1 AND 2: 0
SUM OF ALL RESPONSES TO PHQ9 QUESTIONS 1 AND 2: 0
2. FEELING DOWN, DEPRESSED, IRRITABLE, OR HOPELESS: NOT AT ALL
2. FEELING DOWN, DEPRESSED, IRRITABLE, OR HOPELESS: NOT AT ALL
1. LITTLE INTEREST OR PLEASURE IN DOING THINGS: NOT AT ALL
1. LITTLE INTEREST OR PLEASURE IN DOING THINGS: NOT AT ALL

## 2022-07-10 ASSESSMENT — ENCOUNTER SYMPTOMS
CHILLS: 0
COUGH: 0
FEVER: 0
WHEEZING: 1
NAUSEA: 0
VOMITING: 0
SHORTNESS OF BREATH: 1
SPUTUM PRODUCTION: 0

## 2022-07-10 ASSESSMENT — LIFESTYLE VARIABLES
ALCOHOL_USE: NO
HAVE YOU EVER FELT YOU SHOULD CUT DOWN ON YOUR DRINKING: NO
TOTAL SCORE: 0
CONSUMPTION TOTAL: INCOMPLETE
HAVE PEOPLE ANNOYED YOU BY CRITICIZING YOUR DRINKING: NO
TOTAL SCORE: 0
EVER FELT BAD OR GUILTY ABOUT YOUR DRINKING: NO
TOTAL SCORE: 0
EVER HAD A DRINK FIRST THING IN THE MORNING TO STEADY YOUR NERVES TO GET RID OF A HANGOVER: NO

## 2022-07-10 ASSESSMENT — PAIN DESCRIPTION - PAIN TYPE
TYPE: ACUTE PAIN
TYPE: ACUTE PAIN
TYPE: ACUTE PAIN;CHRONIC PAIN

## 2022-07-10 ASSESSMENT — FIBROSIS 4 INDEX
FIB4 SCORE: 0.59
FIB4 SCORE: 0.27

## 2022-07-10 ASSESSMENT — COPD QUESTIONNAIRES
HAVE YOU SMOKED AT LEAST 100 CIGARETTES IN YOUR ENTIRE LIFE: NO/DON'T KNOW
COPD SCREENING SCORE: 1
DO YOU EVER COUGH UP ANY MUCUS OR PHLEGM?: NO/ONLY WITH OCCASIONAL COLDS OR INFECTIONS
DURING THE PAST 4 WEEKS HOW MUCH DID YOU FEEL SHORT OF BREATH: SOME OF THE TIME

## 2022-07-10 NOTE — PROGRESS NOTES
4 Eyes Skin Assessment Completed by SONYA Paz and SONYA Prather.    Head WDL  Ears WDL  Nose WDL  Mouth WDL  Neck WDL  Breast/Chest WDL  Shoulder Blades WDL  Spine WDL  (R) Arm/Elbow/Hand WDL  (L) Arm/Elbow/Hand WDL  Abdomen WDL  Groin WDL  Scrotum/Coccyx/Buttocks WDL  (R) Leg WDL  (L) Leg WDL  (R) Heel/Foot/Toe WDL  (L) Heel/Foot/Toe WDL          Devices In Places Pulse Ox      Interventions In Place NC W/Ear Foams and Pillows    Possible Skin Injury No    Pictures Uploaded Into Epic N/A  Wound Consult Placed N/A  RN Wound Prevention Protocol Ordered No

## 2022-07-10 NOTE — PROGRESS NOTES
Report received. Patient arrived to unit. Patient complains of shortness of breath. SpO2 of 95% on room air. Called patients Grandmother, per patient request to let her know what room she is in. Patient educated on call light and all personal belongings within reach.

## 2022-07-10 NOTE — ASSESSMENT & PLAN NOTE
With increased work of breathing and shortness of breath despite multiple breathing treatments.   COVID swab is pending  D dimer is negative  She is afebrile with a normal WBC. Chest xray reveals a possible infiltrate though this is a difficult read secondary to habitus. Procalcitonin ordered.  Continuous pulse oxymetry with alarm to go off a less than 90%.   Respiratory protocol for breathing treatments.

## 2022-07-10 NOTE — ED NOTES
3 ultrasound IV attempts have been made, by this RN, Audie HASSAN, and Dr. Salcedo.  Continuing to attempt to obtain PIV access.

## 2022-07-10 NOTE — H&P
"Hospital Medicine History & Physical Note    Date of Service  7/10/2022    Primary Care Physician  Torres Brody M.D.    Consultants  none    Code Status  Full Code    Chief Complaint  Chief Complaint   Patient presents with   • Shortness of Breath     Pt bib ems for SOB at home. Pt states \"having asthma and give herself albuterol and a duoneb treatment at home and feeling better\". Pt says she has some light chest tightness now.        History of Presenting Illness  Kristin Balderrama is a 31 y.o. female who presented 7/10/2022 with shortness of breath.  Ms. Balderrama has a complex medical history occluding multiple psychiatric diagnoses and chronic pain syndrome was most recently admitted to this hospital 6/20/2022 through 6/30/2022 with headache and was followed by neurology.  Prior to that she had been admitted in June with optic neuritis was placed on steroids.  About 3 days ago she had infections in both of her axilla was seen at urgent care and started on Keflex for presumptive hidradenitis superlative.  She states they also placed her on antivirals for Bell's palsy at that time as well.  She states this morning at 3:00 she woke up with shortness of breath and states her lungs sounded \"squeaky\".  She has a history of asthma took multiple duo nebs which helped somewhat but still felt short of breath therefore presented to the emergency room.  Here labs were performed and chest x-ray is read as left basilar atelectasis and/or consolidation.  Oertli a COVID swab and procalcitonin are pending.  She is not hypoxic.  I discussed the plan of care with bedside RN as well as with Dr. Abbott.    Review of Systems  Review of Systems   Constitutional: Negative for chills and fever.   Respiratory: Positive for shortness of breath and wheezing. Negative for cough and sputum production.    Cardiovascular: Negative for chest pain and leg swelling.   Gastrointestinal: Negative for nausea and vomiting.   All other systems " reviewed and are negative.      Past Medical History   has a past medical history of Abdominal pain, Anginal syndrome, Apnea, sleep, Arrhythmia, Arthritis, ASTHMA, Back pain, Borderline personality disorder (HCC), Breath shortness, Bronchitis (02/08/2022), Cardiac arrhythmia, Chickenpox, Chronic UTI (09/18/2010), Cough, Daytime sleepiness, Dental disorder (03/08/2021), Depression, Diabetes ([facility]), Diarrhea, Disorder of thyroid, Fall, Fatigue, Frequent headaches, Gasping for breath, Gynecological disorder, Headache(784.0), Heart burn, Heart murmur, History of falling, Indigestion, Migraine, Mitochondrial disease ([facility]), 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    Surgical History   has a past surgical history that includes neuro dest facet l/s w/ig sngl (4/21/2015); recovery (1/27/2016); [place]mar by laparoscopy (8/29/2010); lumbar fusion anterior (8/21/2012); other cardiac surgery (1/2016); tonsillectomy; bowel stimulator insertion (7/13/2016); gastroscopy with balloon dilatation (N/A, 1/4/2017); muscle biopsy (Right, 1/26/2017); other abdominal surgery; laminotomy; bowel stimulator insertion (3/10/2021); pr lap,diagnostic abdomen (2/14/2022); and ovarian cystectomy (Right, 2/14/2022).     Family History  family history includes Genitourinary () Problems in her sister; Heart Disease in her maternal grandmother and mother; Hypertension in her maternal grandmother, maternal uncle, and mother; No Known Problems in her sister; Other in her mother and sister; Sleep Apnea in her mother.   Family history reviewed with patient. She thinks her  "great aunt may have had a blood clot.    Social History   reports that she has never smoked. She has never used smokeless tobacco. She reports previous drug use. Frequency: 7.00 times per week. Drug: Marijuana. She reports that she does not drink alcohol.    Allergies  Allergies   Allergen Reactions   • Depakote [Divalproex Sodium] Unspecified     Muscle spasms/muscle pain and weakness     • Doxycycline Anaphylaxis and Vomiting     Other reaction(s): pustules/blisters   • Montelukast [Singulair] Unspecified     Cardiac effusion   • Vancomycin Itching     Pt becomes flushed in face and chest.   RXN=7/10/16   • Amitriptyline Unspecified     Headaches     • Aripiprazole [Abilify] Unspecified     Headaches/muscle twitching     • Clindamycin Nausea          • Flomax [Tamsulosin Hydrochloride] Swelling   • Metformin Unspecified     Increased lactic acid     Other reaction(s): itching and rash/nausea vomiting   • Tamsulosin Swelling     Swelling of legs   • Tape Rash     Tears skin off  coban with Tegaderm tape ok intermittently  RXN=ongoing   • Wound Dressing Adhesive Hives     By pt report   • Ampicillin Rash     Pt reports that she received a rash    • Ciprofloxacin Rash         • Keflex Rash     Pt states she gets a rash with this medication  Tolerates ceftriaxone  Can take with Benadryl   • Levofloxacin Unspecified     Leg muscle cramps   • Metronidazole Rash     \"Vision problems\"   • Penicillins Hives     Can take with Benadryl   • Sulfa Drugs Itching and Myalgia     Muscle pain and weakness   • Valproic Acid Rash     .       Medications  Prior to Admission Medications   Prescriptions Last Dose Informant Patient Reported? Taking?   Melatonin 10 MG Tab  Patient Yes No   Sig: Take 10 mg by mouth every bedtime.   SUMAtriptan (IMITREX) 50 MG Tab   No No   Sig: Take 1 Tablet by mouth 1 time a day as needed for Migraine.   albuterol 108 (90 Base) MCG/ACT Aero Soln inhalation aerosol  Patient No No   Sig: Inhale 2 Puffs " every 6 hours as needed for Shortness of Breath.   diphenhydrAMINE HCl (BENADRYL PO)  Patient Yes No   Sig: Take 50 mg by mouth every evening. Night time   docusate sodium (COLACE) 100 MG Cap  Patient No No   Sig: Take 1 Capsule by mouth 2 times a day.   ibuprofen (MOTRIN) 600 MG Tab  Patient No No   Sig: Take 1 Tablet by mouth every 6 hours as needed.   ipratropium-albuterol (DUONEB) 0.5-2.5 (3) MG/3ML nebulizer solution  Patient No No   Sig: Take 3 mL by nebulization every four hours as needed for Shortness of Breath. Nebulizer   ivabradine (CORLANOR) 5 MG Tab tablet  Patient No No   Sig: Take 1 Tablet by mouth 2 times a day with meals.   ivabradine (CORLANOR) 7.5 MG Tab tablet  Patient Yes No   Sig: Take 7.5 mg by mouth every day.   lactulose 20 GM/30ML Solution  Patient No No   Sig: Take 30 mL by mouth 2 times a day.   ondansetron (ZOFRAN ODT) 4 MG TABLET DISPERSIBLE   No No   Sig: Take 1 Tablet by mouth every 6 hours as needed for Nausea.   traZODone (DESYREL) 100 MG Tab  Patient Yes No   Sig: Take 100 mg by mouth every evening.   ziprasidone (GEODON) 40 MG Cap  Patient No No   Sig: TAKE 1 CAPSULE BY MOUTH TWICE A DAY. APPOINTMENT REQUIRED FOR ADDITIONAL REFILLS.  CALL [phone]. .   Patient taking differently: Take 40 mg by mouth every evening.      Facility-Administered Medications: None       Physical Exam  Temp:  [36.5 °C (97.7 °F)-37.3 °C (99.1 °F)] 37.3 °C (99.1 °F)  Pulse:  [74-90] 90  Resp:  [20-22] 20  BP: (142-182)/() 153/84  SpO2:  [96 %-100 %] 96 %  Blood Pressure: (!) 153/84   Temperature: 37.3 °C (99.1 °F)   Pulse: 90   Respiration: 20   Pulse Oximetry: 96 %       Physical Exam  Vitals and nursing note reviewed.   Constitutional:       General: She is in acute distress.      Appearance: She is obese. She is ill-appearing. She is not toxic-appearing or diaphoretic.   HENT:      Head: Normocephalic and atraumatic.      Mouth/Throat:      Mouth: Mucous membranes are dry.       Pharynx: Oropharynx is clear.   Eyes:      General: No scleral icterus.     Conjunctiva/sclera: Conjunctivae normal.   Cardiovascular:      Rate and Rhythm: Normal rate and regular rhythm.      Heart sounds: No murmur heard.  Pulmonary:      Breath sounds: No stridor. No wheezing, rhonchi or rales.      Comments: Clear breath sounds  Tachypnea  Moderate air movement  Abdominal:      General: There is no distension.      Tenderness: There is no abdominal tenderness.   Musculoskeletal:      Cervical back: Normal range of motion and neck supple.      Right lower leg: No edema.      Left lower leg: No edema.      Comments: Negative Dhiraj's sign   Skin:     General: Skin is warm and dry.   Neurological:      General: No focal deficit present.      Mental Status: She is alert and oriented to person, place, and time.   Psychiatric:      Comments: Compliant with exam  Pleasant demeanor          Laboratory:  Recent Labs     07/10/22  0530   WBC 5.4   RBC 4.17*   HEMOGLOBIN 13.0   HEMATOCRIT 37.9   MCV 90.9   MCH 31.2   MCHC 34.3   RDW 42.4   PLATELETCT 148*   MPV 11.5     Recent Labs     07/10/22  0530   SODIUM 143   POTASSIUM 3.5*   CHLORIDE 112   CO2 16*   GLUCOSE 186*   BUN 12   CREATININE 0.73   CALCIUM 9.0     Recent Labs     07/10/22  0530   ALTSGPT 23   ASTSGOT 13   ALKPHOSPHAT 86   TBILIRUBIN 0.3   GLUCOSE 186*         No results for input(s): NTPROBNP in the last 72 hours.      No results for input(s): TROPONINT in the last 72 hours.    Imaging:  DX-CHEST-PORTABLE (1 VIEW)   Final Result      Left basilar atelectasis and/or consolidation. Underlying infection is possible.            Assessment/Plan:  Justification for Admission Status  I anticipate this patient is appropriate for observation status at this time because bronchodilator therapy and close monitoring of oxygen saturations given her risk of decompensation due to BMI 42    * Acute asthma exacerbation- (present on admission)  Assessment & Plan  With  increased work of breathing and shortness of breath despite multiple breathing treatments.   COVID swab is pending  D dimer is negative  She is afebrile with a normal WBC. Chest xray reveals a possible infiltrate though this is a difficult read secondary to habitus. Procalcitonin ordered.  Continuous pulse oxymetry with alarm to go off a less than 90%.   Respiratory protocol for breathing treatments.      Hidradenitis axillaris- (present on admission)  Assessment & Plan  Recently diagnosed at urgent care  Continue Keflex  This has improved substantially     Hypokalemia- (present on admission)  Assessment & Plan  Potassium is low at 3.5  This is likely transient and secondary to multiple albuterol treatments.     Peripheral neuropathy and chronic pain syndrome (CMS-HCC)- (present on admission)  Assessment & Plan  History of    Borderline personality disorder in adult (HCC)- (present on admission)  Assessment & Plan  Continue outpatient Geodon    Morbidly obese (HCC)- (present on admission)  Assessment & Plan  BMI 42      VTE prophylaxis: Xarelto 10 mg daily as prophylaxis

## 2022-07-10 NOTE — ED NOTES
Report from Supriya HASSAN. Pt resting in [facility], Pt tachy on the monitor, VSS otherwise. Pt provided ice chips per ERP Ok

## 2022-07-10 NOTE — ASSESSMENT & PLAN NOTE
Potassium is low at 3.5  This is likely transient and secondary to multiple albuterol treatments.

## 2022-07-10 NOTE — ED TRIAGE NOTES
"Chief Complaint   Patient presents with   • Shortness of Breath     Pt bib ems for SOB at home. Pt states \"having asthma and give herself albuterol and a duoneb treatment at home and feeling better\". Pt says she has some light chest tightness now.        "

## 2022-07-10 NOTE — ED PROVIDER NOTES
"ED Provider Note    Scribed for Jimmy Abbott M.D. by Dayo Diggs. 7/10/2022  5:00 AM    Primary care provider: Torres Brody M.D.  Means of arrival: EMS  History obtained from: Patient  History limited by: None    CHIEF COMPLAINT  Chief Complaint   Patient presents with   • Shortness of Breath     Pt bib ems for SOB at home. Pt states \"having asthma and give herself albuterol and a duoneb treatment at home and feeling better\". Pt says she has some light chest tightness now.        HPI  Kristin Balderrama is a 31 y.o. female who presents to the Emergency Department for evaluation of shortness of breath onset 1 hour prior to exam. Per nursing, patient reported giving herself an albuterol and duoneb treatment at home which helped. She reports having some chest tightness now. No reports of any fever. Patient denies any drug use.    REVIEW OF SYSTEMS  Pertinent positives include: shortness of breath, chest tightness. Pertinent negatives include: fever. See history of present illness. All other systems are negative.     PAST MEDICAL HISTORY   has a past medical history of Abdominal pain, Anginal syndrome, Apnea, sleep, Arrhythmia, Arthritis, ASTHMA, Back pain, Borderline personality disorder (HCC), Breath shortness, Bronchitis (02/08/2022), Cardiac arrhythmia, Chickenpox, Chronic UTI (09/18/2010), Cough, Daytime sleepiness, Dental disorder (03/08/2021), Depression, Diabetes (HCC), Diarrhea, Disorder of thyroid, Fall, Fatigue, Frequent headaches, Gasping for breath, Gynecological disorder, Headache(784.0), Heart burn, Heart murmur, History of falling, Indigestion, Migraine, Mitochondrial disease (HCC), Multiple personality disorder (HCC), Nausea, Obesity, Other fatigue (06/29/2020), Pain (08/15/2012), Painful joint, PCOS (polycystic ovarian syndrome), Pneumonia (2012 12/2020), Psychosis (HCC), Ringing in ears, Scoliosis, Shortness of breath, Sinus tachycardia (10/31/2013), Sleep apnea, Snoring, Tonsillitis, " 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HCC), Tuberculosis, Urinary bladder disorder, Urinary incontinence, Weakness, and Wears glasses.    SURGICAL HISTORY   has a past surgical history that includes neuro dest facet l/s w/ig sngl (4/21/2015); recovery (1/27/2016); delmar by laparoscopy (8/29/2010); lumbar fusion anterior (8/21/2012); other cardiac surgery (1/2016); tonsillectomy; bowel stimulator insertion (7/13/2016); gastroscopy with balloon dilatation (N/A, 1/4/2017); muscle biopsy (Right, 1/26/2017); other abdominal surgery; laminotomy; bowel stimulator insertion (3/10/2021); lap,diagnostic abdomen (2/14/2022); and ovarian cystectomy (Right, 2/14/2022).    SOCIAL HISTORY  Social History     Tobacco Use   • Smoking status: Never Smoker   • Smokeless tobacco: Never Used   Vaping Use   • Vaping Use: Never used   Substance Use Topics   • Alcohol use: No     Alcohol/week: 0.0 oz   • Drug use: Not Currently     Frequency: 7.0 times per week     Types: Marijuana      Social History     Substance and Sexual Activity   Drug Use Not Currently   • Frequency: 7.0 times per week   • Types: Marijuana       FAMILY HISTORY  Family History   Problem Relation Age of Onset   • Hypertension Mother    • Sleep Apnea Mother    • Heart Disease Mother    • Other Mother         hypothryod   • Hypertension Maternal Uncle    • Heart Disease Maternal Grandmother    • Hypertension Maternal Grandmother    • No Known Problems Sister    • Other Sister         Narcolepsy;fibromyalsia;bone;nerve   • Genitourinary () Problems Sister         endometriosis       CURRENT MEDICATIONS  Home Medications     Reviewed by Ulisses Mascorro (Pharmacy Tech) on 07/10/22 at 0811  Med List Status: Complete   Medication Last Dose Status   albuterol 108 (90 Base) MCG/ACT Aero Soln inhalation aerosol 7/10/2022 Active   cephALEXin (KEFLEX) 500 MG Cap 7/9/2022 Active   diphenhydrAMINE (BENADRYL) 25 MG Tab 7/9/2022 Active   docusate sodium (COLACE) 100 MG Cap 7/9/2022 Active  "  ibuprofen (MOTRIN) 600 MG Tab 7/10/2022 Active   ipratropium-albuterol (DUONEB) 0.5-2.5 (3) MG/3ML nebulizer solution 7/10/2022 Active   ivabradine (CORLANOR) 7.5 MG Tab tablet 7/9/2022 Active   lactulose 20 GM/30ML Solution 7/9/2022 Active   Melatonin 10 MG Tab 7/9/2022 Active   ondansetron (ZOFRAN ODT) 4 MG TABLET DISPERSIBLE 7/9/2022 Active   SUMAtriptan (IMITREX) 50 MG Tab 7/9/2022 Active   traZODone (DESYREL) 100 MG Tab 7/9/2022 Active   valacyclovir (VALTREX) 1 GM Tab 7/9/2022 Active   ziprasidone (GEODON) 40 MG Cap 7/9/2022 Active                ALLERGIES  Allergies   Allergen Reactions   • Depakote [Divalproex Sodium] Unspecified     Muscle spasms/muscle pain and weakness     • Doxycycline Anaphylaxis and Vomiting     Other reaction(s): pustules/blisters   • Montelukast [Singulair] Unspecified     Cardiac effusion   • Vancomycin Itching     Pt becomes flushed in face and chest.   RXN=7/10/16   • Amitriptyline Unspecified     Headaches     • Aripiprazole [Abilify] Unspecified     Headaches/muscle twitching     • Clindamycin Nausea          • Flomax [Tamsulosin Hydrochloride] Swelling   • Metformin Unspecified     Increased lactic acid     Other reaction(s): itching and rash/nausea vomiting   • Tamsulosin Swelling     Swelling of legs   • Tape Rash     Tears skin off  coban with Tegaderm tape ok intermittently  RXN=ongoing   • Wound Dressing Adhesive Hives     By pt report   • Ampicillin Rash     Pt reports that she received a rash    • Ciprofloxacin Rash         • Keflex Rash     Pt states she gets a rash with this medication  Tolerates ceftriaxone  Can take with Benadryl   • Levofloxacin Unspecified     Leg muscle cramps   • Metronidazole Rash     \"Vision problems\"   • Penicillins Hives     Can take with Benadryl   • Sulfa Drugs Itching and Myalgia     Muscle pain and weakness   • Valproic Acid Rash     .       PHYSICAL EXAM  VITAL SIGNS: BP (!) 153/84   Pulse 90   Temp 37.3 °C (99.1 °F) (Temporal)   " "Resp 20   Ht 1.6 m (5' 3\")   Wt 109 kg (240 lb)   SpO2 96%   BMI 42.51 kg/m²     Constitutional: Alert in no apparent distress.  HENT: No signs of trauma, Bilateral external ears normal, Nose normal. Uvula midline.   Eyes: Pupils are equal and reactive, Conjunctiva normal, Non-icteric.   Neck: Normal range of motion, No tenderness, Supple, No stridor.   Lymphatic: No lymphadenopathy noted.   Cardiovascular: Regular rate and rhythm, no murmurs.   Thorax & Lungs: Diminished breath sounds bilaterally, Two-word dyspnea, No wheezing, No chest tenderness.   Abdomen:  Soft, No tenderness, No peritoneal signs, No masses, No pulsatile masses.   Skin: Warm, Dry, No erythema, No rash.   Back: No bony tenderness, No CVA tenderness.   Extremities: Intact distal pulses, No edema, No tenderness, No cyanosis.  Musculoskeletal: Good range of motion in all major joints. No tenderness to palpation or major deformities noted.   Neurologic: Alert , Normal motor function, Normal sensory function, No focal deficits noted.   Psychiatric: Affect normal, Judgment normal, Mood normal.     DIAGNOSTIC STUDIES / PROCEDURES    LABS  Labs Reviewed   CBC WITH DIFFERENTIAL - Abnormal; Notable for the following components:       Result Value    RBC 4.17 (*)     Platelet Count 148 (*)     Immature Granulocytes 1.10 (*)     All other components within normal limits   COMP METABOLIC PANEL - Abnormal; Notable for the following components:    Potassium 3.5 (*)     Co2 16 (*)     Glucose 186 (*)     All other components within normal limits   VENOUS BLOOD GAS - Abnormal; Notable for the following components:    Venous Bg Pco2 27.4 (*)     Venous Bg Hco3 15 (*)     All other components within normal limits   POCT GLUCOSE DEVICE RESULTS - Abnormal; Notable for the following components:    POC Glucose, Blood 188 (*)     All other components within normal limits   D-DIMER   HCG QUAL SERUM   ESTIMATED GFR   COV-2, FLU A/B, AND RSV BY PCR (Mobi) "   PROCALCITONIN      All labs reviewed by me.      RADIOLOGY  DX-CHEST-PORTABLE (1 VIEW)   Final Result      Left basilar atelectasis and/or consolidation. Underlying infection is possible.        The radiologist's interpretation of all radiological studies have been reviewed by me.    COURSE & MEDICAL DECISION MAKING  Nursing notes, Dallin EID reviewed in chart.    31 y.o. female p/w chief complaint of asthma exacerbation.    5:00 AM Patient seen and examined at bedside.  Patient will get breathing treatment. Ordered labs and imaging to evaluate.    6:20 AM Patient reevaluated at bedside. Patient is tachycardic. Diminished breath sounds bilaterally.    I verified that the patient was wearing a mask and I was wearing appropriate PPE every time I entered the room. The patient's mask was on the patient at all times during my encounter except for a brief view of the oropharynx.     The differential diagnoses include but are not limited to:     Asthma exacerbation: history and physical exam consistent with asthma exacerbation.  Patient given duonebs and steroids upon arrival with improvement in wheezing and shortness of breath    No chest pain or abnormal tightness to suggest ACS  No fever or unilateral breath sounds to suggest pneumonia  No unilateral leg swelling, hemoptysis, history of DVT or PE, or family history of hypercoagulability to suggest PE     Given patient's persistent shortness of breath and increased work of breathing plan for admission after continuous nebs and steroids    COVID test pending at time of admission    The total critical care time on this patient is 40 minutes, resuscitating patient, speaking with admitting physician, and deciphering test results. This 40 minutes is exclusive of separately billable procedures.     FINAL IMPRESSION  1. Moderate persistent asthma with acute exacerbation         CCT:40min     Dayo RODRIGUEZ (Scribe), am scribing for, and in the presence of, Jimmy LAWRENCE  RUFUS Abbott.    Electronically signed by: Dayo Diggs (Scribe), 7/10/2022    IJimmy M.D. personally performed the services described in this documentation, as scribed by Dayo Diggs in my presence, and it is both accurate and complete.    The note accurately reflects work and decisions made by me.  Jimmy Abbott M.D.  7/10/2022  8:34 AM

## 2022-07-11 VITALS
DIASTOLIC BLOOD PRESSURE: 66 MMHG | RESPIRATION RATE: 18 BRPM | HEART RATE: 67 BPM | HEIGHT: 63 IN | TEMPERATURE: 97.8 F | OXYGEN SATURATION: 96 % | WEIGHT: 239.42 LBS | SYSTOLIC BLOOD PRESSURE: 111 MMHG | BODY MASS INDEX: 42.42 KG/M2

## 2022-07-11 PROBLEM — E87.6 HYPOKALEMIA: Status: RESOLVED | Noted: 2019-09-29 | Resolved: 2022-07-11

## 2022-07-11 PROBLEM — J45.901 ACUTE ASTHMA EXACERBATION: Status: RESOLVED | Noted: 2022-07-10 | Resolved: 2022-07-11

## 2022-07-11 PROCEDURE — G0378 HOSPITAL OBSERVATION PER HR: HCPCS

## 2022-07-11 PROCEDURE — 700102 HCHG RX REV CODE 250 W/ 637 OVERRIDE(OP): Performed by: HOSPITALIST

## 2022-07-11 PROCEDURE — 99217 PR OBSERVATION CARE DISCHARGE: CPT | Performed by: NURSE PRACTITIONER

## 2022-07-11 PROCEDURE — 700111 HCHG RX REV CODE 636 W/ 250 OVERRIDE (IP): Performed by: NURSE PRACTITIONER

## 2022-07-11 PROCEDURE — 700111 HCHG RX REV CODE 636 W/ 250 OVERRIDE (IP): Performed by: HOSPITALIST

## 2022-07-11 PROCEDURE — A9270 NON-COVERED ITEM OR SERVICE: HCPCS | Performed by: HOSPITALIST

## 2022-07-11 PROCEDURE — 96375 TX/PRO/DX INJ NEW DRUG ADDON: CPT

## 2022-07-11 RX ORDER — IPRATROPIUM BROMIDE AND ALBUTEROL SULFATE 2.5; .5 MG/3ML; MG/3ML
3 SOLUTION RESPIRATORY (INHALATION) EVERY 4 HOURS PRN
Qty: 540 ML | Refills: 0 | Status: SHIPPED | OUTPATIENT
Start: 2022-07-11 | End: 2022-08-10

## 2022-07-11 RX ORDER — KETOROLAC TROMETHAMINE 30 MG/ML
15 INJECTION, SOLUTION INTRAMUSCULAR; INTRAVENOUS ONCE
Status: COMPLETED | OUTPATIENT
Start: 2022-07-11 | End: 2022-07-11

## 2022-07-11 RX ADMIN — KETOROLAC TROMETHAMINE 15 MG: 30 INJECTION, SOLUTION INTRAMUSCULAR; INTRAVENOUS at 08:41

## 2022-07-11 RX ADMIN — CEPHALEXIN 500 MG: 500 CAPSULE ORAL at 08:39

## 2022-07-11 RX ADMIN — IBUPROFEN 600 MG: 600 TABLET, FILM COATED ORAL at 02:45

## 2022-07-11 RX ADMIN — ONDANSETRON 4 MG: 2 INJECTION INTRAMUSCULAR; INTRAVENOUS at 02:42

## 2022-07-11 RX ADMIN — VALACYCLOVIR HYDROCHLORIDE 1000 MG: 500 TABLET, FILM COATED ORAL at 08:39

## 2022-07-11 RX ADMIN — SENNOSIDES AND DOCUSATE SODIUM 2 TABLET: 50; 8.6 TABLET ORAL at 06:06

## 2022-07-11 ASSESSMENT — PAIN DESCRIPTION - PAIN TYPE
TYPE: ACUTE PAIN
TYPE: ACUTE PAIN

## 2022-07-11 NOTE — PROGRESS NOTES
Arrive to dc lounge via wheelchair. A/O, not in distress. DC instructions reviewed w/ pt, verbalized understanding.

## 2022-07-11 NOTE — CARE PLAN
The patient is          Progress made toward(s) clinical / shift goals:  Patient pain controlled, provided education about treatment and diagnosis     Patient is not progressing towards the following goals: N/A      Problem: Knowledge Deficit - Standard  Goal: Patient and family/care givers will demonstrate understanding of plan of care, disease process/condition, diagnostic tests and medications  Outcome: Progressing     Problem: Pain - Standard  Goal: Alleviation of pain or a reduction in pain to the patient’s comfort goal  Outcome: Progressing

## 2022-07-11 NOTE — DISCHARGE SUMMARY
"Discharge Summary    CHIEF COMPLAINT ON ADMISSION  Chief Complaint   Patient presents with   • Shortness of Breath     Pt bib ems for SOB at home. Pt states \"having asthma and give herself albuterol and a duoneb treatment at home and feeling better\". Pt says she has some light chest tightness now.        Reason for Admission  EMS     Admission Date  7/10/2022    CODE STATUS  Full Code    HPI & HOSPITAL COURSE    Ms. Kristin Balderrama is a 32-year-old female with a complex PMHx including multiple psychiatric diagnoses, chronic pain syndrome, asthma, admitted on 7/10/2022 due to shortness of breath.    Patient recently admitted to [facility] from [facility] due to headache of which she was followed by neurology due to optic neuritis and was placed on steroids.  Approximately 3 days prior to this admission, patient noted to have infection in her axilla due to hidradenitis axillaris and was initiated on Keflex.  Patient also stated that she was placed on antivirals for Bell's palsy as well.  Around 3:30 in the morning on admission day, patient woke up with shortness of breath and states that her lungs sounded \"squeaky \".  Patient reports taking multiple DuoNebs and inhalers after this event, but still felt short of breath and therefore presented herself to the ER for further evaluation.    In ER, notable lab findings noted potassium 3.5, glucose 186.  Venous blood gas was also obtained which was all unremarkable.  Influenza A/B, RSV, COVID-19 also obtain which were all negative.  Initial CXR noted left basilar atelectasis and/or consolidation with underlying infection is possible.  Patient was admitted into the observation unit for monitoring.    During this hospital stay, no overnight changes were noted.  RT protocol was in place for breathing treatments as needed.  Patient inhalers and duo nebs were given as scheduled.  Patient seen and examined prior to being discharged.  Discussed with patient staying compliant with " "inhalers.  Patient also follows a pulmonologist, Dr. Degroot of which she is recommended to follow-up as soon as possible for management of care.  Patient given a refill on her DuoNeb as she mentioned that she ran out.  All questions and concerns answered prior to being discharged.  Patient discharged home.      Therefore, she is discharged in good and stable condition to home with close outpatient follow-up.    The patient recovered much more quickly than anticipated on admission.    Discharge Date  07/11/22      FOLLOW UP ITEMS POST DISCHARGE  Please call [phone] to schedule PCP appointment for patient.    Required specialty appointments include:       Discharge Instructions per JERED TorresREffieNEffie    -Follow-up with PCP s/p hospitalization  -Follow-up with pulmonology for better management of asthma  -Resume all home medication    DIET: As tolerated    ACTIVITY: As tolerated    DIAGNOSIS: Shortness of breath    Return to ER if symptoms persist, chest pain, palpitations, shortness of breath, numbness, tingling, weakness, and high fevers.      DISCHARGE DIAGNOSES  Principal Problem (Resolved):    Acute asthma exacerbation POA: Yes  Active Problems:    Borderline personality disorder in adult (HCC) POA: Yes      Overview: 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      \"per patients grandmother\"    Peripheral neuropathy and chronic pain syndrome (CMS-HCC) (Chronic) POA: Yes    Morbidly obese (HCC) POA: Yes      Overview: IMO load March 2020    Hidradenitis axillaris POA: Yes  Resolved Problems:    Hypothyroidism (Chronic) POA: Yes    Hypokalemia POA: Yes      FOLLOW UP  Future Appointments   Date Time Provider Department Center   10/21/2022 11:00 AM Des Celis M.D. [facility] None   11/10/2022 12:40 PM Jonh Leon M.D. [facility] None     Torres Brody M.D.  [address]  [place] [id number] [phone]    Schedule an appointment as soon as " possible for a visit in 1 week(s)        MEDICATIONS ON DISCHARGE     Medication List      CONTINUE taking these medications      Instructions   albuterol 108 (90 Base) MCG/ACT Aers inhalation aerosol   Inhale 2 Puffs every 6 hours as needed for Shortness of Breath.  Dose: 2 Puff     cephALEXin 500 MG Caps  Commonly known as: KEFLEX   Take 500 mg by mouth 3 times a day. Pt started a 14 day course of KEFLEX on 7/07  Dose: 500 mg     Corlanor 7.5 MG Tabs tablet  Generic drug: ivabradine   Take 7.5 mg by mouth every evening.  Dose: 7.5 mg     diphenhydrAMINE 25 MG Tabs  Commonly known as: BENADRYL   Take 50 mg by mouth at bedtime as needed for Sleep.  Dose: 50 mg     docusate sodium 100 MG Caps  Commonly known as: COLACE   Take 1 Capsule by mouth 2 times a day.  Dose: 100 mg     ibuprofen 600 MG Tabs  Commonly known as: MOTRIN   Take 1 Tablet by mouth every 6 hours as needed.  Dose: 600 mg     ipratropium-albuterol 0.5-2.5 (3) MG/3ML nebulizer solution  Commonly known as: DUONEB   Take 3 mL by nebulization every four hours as needed for Shortness of Breath. Nebulizer  Dose: 3 mL     lactulose 20 GM/30ML Soln   Take 30 mL by mouth 2 times a day.  Dose: 30 mL     Melatonin 10 MG Tabs   Take 10 mg by mouth every bedtime.  Dose: 10 mg     ondansetron 4 MG Tbdp  Commonly known as: ZOFRAN ODT   Take 1 Tablet by mouth every 6 hours as needed for Nausea.  Dose: 4 mg     SUMAtriptan 50 MG Tabs  Commonly known as: IMITREX   Take 1 Tablet by mouth 1 time a day as needed for Migraine.  Dose: 50 mg     traZODone 100 MG Tabs  Commonly known as: DESYREL   Take 100 mg by mouth every evening.  Dose: 100 mg     Valtrex 1 GM Tabs  Generic drug: valacyclovir   Take 1,000 mg by mouth 4 times a day. Pt started a 7 day course of VALTREX on 7/07  Dose: 1,000 mg     ziprasidone 40 MG Caps  Commonly known as: GEODON   Take 40 mg by mouth at bedtime.  Dose: 40 mg            Allergies  Allergies   Allergen Reactions   • Depakote [Divalproex  "Sodium] Unspecified     Muscle spasms/muscle pain and weakness     • Doxycycline Anaphylaxis and Vomiting     Other reaction(s): pustules/blisters   • Montelukast [Singulair] Unspecified     Cardiac effusion   • Vancomycin Itching     Pt becomes flushed in face and chest.   RXN=7/10/16   • Amitriptyline Unspecified     Headaches     • Aripiprazole [Abilify] Unspecified     Headaches/muscle twitching     • Clindamycin Nausea          • Flomax [Tamsulosin Hydrochloride] Swelling   • Metformin Unspecified     Increased lactic acid     Other reaction(s): itching and rash/nausea vomiting   • Tamsulosin Swelling     Swelling of legs   • Tape Rash     Tears skin off  coban with Tegaderm tape ok intermittently  RXN=ongoing   • Wound Dressing Adhesive Hives     By pt report   • Ampicillin Rash     Pt reports that she received a rash    • Ciprofloxacin Rash         • Keflex Rash     Pt states she gets a rash with this medication  Tolerates ceftriaxone  Can take with Benadryl   • Levofloxacin Unspecified     Leg muscle cramps   • Metronidazole Rash     \"Vision problems\"   • Penicillins Hives     Can take with Benadryl   • Sulfa Drugs Itching and Myalgia     Muscle pain and weakness   • Valproic Acid Rash     .       DIET  Orders Placed This Encounter   Procedures   • Diet Order Diet: Regular     Standing Status:   Standing     Number of Occurrences:   1     Order Specific Question:   Diet:     Answer:   Regular [1]       ACTIVITY  As tolerated.  Weight bearing as tolerated    CONSULTATIONS  NONE    PROCEDURES  NONE    IMAGING  DX-CHEST-PORTABLE (1 VIEW)   Final Result      Left basilar atelectasis and/or consolidation. Underlying infection is possible.            LABORATORY  Lab Results   Component Value Date    SODIUM 143 07/10/2022    POTASSIUM 3.5 (L) 07/10/2022    CHLORIDE 112 07/10/2022    CO2 16 (L) 07/10/2022    GLUCOSE 186 (H) 07/10/2022    BUN 12 07/10/2022    CREATININE 0.73 07/10/2022    CREATININE 0.75 (L) " 07/20/2010    GLOMRATE >59 07/20/2010        Lab Results   Component Value Date    WBC 5.4 07/10/2022    WBC 6.1 07/20/2010    HEMOGLOBIN 13.0 07/10/2022    HEMATOCRIT 37.9 07/10/2022    PLATELETCT 148 (L) 07/10/2022        Total time of the discharge process took 36 minutes.    ========================================================================================================  Please note that this dictation was created using voice recognition software. I have made every reasonable attempt to correct obvious errors, but there may be errors of grammar and possibly content that I did not discover before finalizing the note.    Electronically signed by:  JOYCE Almonte, MSN, APRN, FNP-C  Hospitalist Services  [facility]  [phone]  [email or web address]  07/11/22                 [phone]

## 2022-07-11 NOTE — DISCHARGE INSTRUCTIONS
FOLLOW UP ITEMS POST DISCHARGE  Please call [phone] to schedule PCP appointment for patient.    Required specialty appointments include:       Discharge Instructions per Monica Almonte, UJDPEffieREffieN.    -Follow-up with PCP s/p hospitalization  -Follow-up with pulmonology for better management of asthma  -Resume all home medication    DIET: As tolerated    ACTIVITY: As tolerated    DIAGNOSIS: Shortness of breath    Return to ER if symptoms persist, chest pain, palpitations, shortness of breath, numbness, tingling, weakness, and high fevers.

## 2022-07-11 NOTE — HOSPITAL COURSE
"Ms. Kristin Balderrama is a 32-year-old female with a complex PMHx including multiple psychiatric diagnoses, chronic pain syndrome, asthma, admitted on 7/10/2022 due to shortness of breath.    Patient recently admitted to [facility] from [address] [place] [address] due to headache of which she was followed by neurology due to optic neuritis and was placed on steroids.  Approximately 3 days prior to this admission, patient noted to have infection in her axilla due to hidradenitis axillaris and was initiated on Keflex.  Patient also stated that she was placed on antivirals for Bell's palsy as well.  Around 3:30 in the morning on admission day, patient woke up with shortness of breath and states that her lungs sounded \"squeaky \".  Patient reports taking multiple DuoNebs and inhalers after this event, but still felt short of breath and therefore presented herself to the ER for further evaluation.    In ER, notable lab findings noted potassium 3.5, glucose 186.  Venous blood gas was also obtained which was all unremarkable.  Influenza A/B, RSV, COVID-19 also obtain which were all negative.  Initial CXR noted left basilar atelectasis and/or consolidation with underlying infection is possible.  Patient was admitted into the observation unit for monitoring.    During this hospital stay, no overnight changes were noted.  RT protocol was in place for breathing treatments as needed.  Patient inhalers and duo nebs were given as scheduled.  Patient seen and examined prior to being discharged.  Discussed with patient staying compliant with inhalers.  Patient also follows a pulmonologist, Dr. Degroot of which she is recommended to follow-up as soon as possible for management of care.  Patient given a refill on her DuoNeb as she mentioned that she ran out.  All questions and concerns answered prior to being discharged.  Patient discharged home.  "

## 2022-07-11 NOTE — CARE PLAN
Problem: Knowledge Deficit - Standard  Goal: Patient and family/care givers will demonstrate understanding of plan of care, disease process/condition, diagnostic tests and medications  Outcome: Progressing     Problem: Pain - Standard  Goal: Alleviation of pain or a reduction in pain to the patient’s comfort goal  Outcome: Progressing   The patient is Stable - Low risk of patient condition declining or worsening         Progress made toward(s) clinical / shift goals:  Pt updated on POC. Pt medicated for pain.     Patient is not progressing towards the following goals:

## 2022-07-12 ENCOUNTER — OFFICE VISIT (OUTPATIENT)
Dept: SLEEP MEDICINE | Facility: MEDICAL CENTER | Age: 32
End: 2022-07-12
Payer: MEDICARE

## 2022-07-12 VITALS
RESPIRATION RATE: 16 BRPM | BODY MASS INDEX: 42.35 KG/M2 | HEART RATE: 88 BPM | WEIGHT: 239 LBS | HEIGHT: 63 IN | OXYGEN SATURATION: 95 % | SYSTOLIC BLOOD PRESSURE: 124 MMHG | DIASTOLIC BLOOD PRESSURE: 84 MMHG

## 2022-07-12 DIAGNOSIS — J45.20 MILD INTERMITTENT ASTHMA WITHOUT COMPLICATION: ICD-10-CM

## 2022-07-12 DIAGNOSIS — G47.33 OSA (OBSTRUCTIVE SLEEP APNEA): ICD-10-CM

## 2022-07-12 DIAGNOSIS — F60.3 BORDERLINE PERSONALITY DISORDER IN ADULT (HCC): ICD-10-CM

## 2022-07-12 DIAGNOSIS — E66.01 MORBID OBESITY (HCC): ICD-10-CM

## 2022-07-12 DIAGNOSIS — F43.10 POSTTRAUMATIC STRESS DISORDER: ICD-10-CM

## 2022-07-12 PROCEDURE — 99213 OFFICE O/P EST LOW 20 MIN: CPT | Performed by: PHYSICIAN ASSISTANT

## 2022-07-12 ASSESSMENT — ENCOUNTER SYMPTOMS
INSOMNIA: 0
TREMORS: 0
EYE REDNESS: 1
ORTHOPNEA: 0
CHILLS: 0
HEADACHES: 1
SHORTNESS OF BREATH: 1
WEIGHT LOSS: 0
PALPITATIONS: 1
SPUTUM PRODUCTION: 1
DIZZINESS: 0
SINUS PAIN: 0
FEVER: 0
HEARTBURN: 1
COUGH: 1
SORE THROAT: 0
WHEEZING: 1

## 2022-07-12 ASSESSMENT — FIBROSIS 4 INDEX: FIB4 SCORE: 0.59

## 2022-07-12 NOTE — PATIENT INSTRUCTIONS
1-HAS BEEN WAITING FOR CPAP  2-BELIEVE SHE HAS RECEIVED UNIT, REVIEWED EQUIPMENT CLEANING  3-WILL NEED FOLLOW UP APPOINTMENT 6 WEEKS OR SO AFTER RECEIVING AND INITIATING USE  4-SEASONAL INCREASE IN RESPIRATORY SYMPTOMS DURING WINTER/COLD AIR AND FIRE SEASON  5-WILL TRY PRIOR AUTHORIZATION FOR DUONEB  6-FOLLOW UP IN ABOUT 6 WEEKS WITH SLEEP PROVIDER

## 2022-07-12 NOTE — PROGRESS NOTES
CC: Seasonal increase in symptoms of asthma    HPI:  Kristin Balderrama is a 31 y.o. year old female here today for follow-up on acute asthma exacerbation brought in to ED by EMS on 7/10/2022 where she was observed x 30 hrs.  Patient was also seen in the ED on 6/21/22 and 6/22/2022 for headache/migraine as well as admitted on 6/28/22 for vision issues. Last seen in clinic 3/9/2022 by Dr. Zayas for her TONYA and not since 10/4/2019 by Dr. Ignacia Herrera for her pulmonary issues including severe persistent asthma, vocal cord dysfunction, chronic respiratory failure with hypoxia.     Pertinent past medical history includes optic neuritis for which she was treated with CellCept and chronic prednisone, CKD, unspecified weakness hypothyroidism, PTSD, schizophrenia childhood asthma.  Patient reports increased symptoms with winter or cold air and during fire season.  She reports still awaiting CPAP.     Reviewed in clinic vitals /84, HR 88, O2 sat of 95% and BMI of 42.34 kg/m².    Reviewed home medication regimen including albuterol inhaler, requesting prior authorization DuoNeb which she was ordered on discharge from the ED.  She reports taking Benadryl every night.  Patient apparently was treated with Symbicort and Breo previously but is on no maintenance inhaler at present time.  She wants to focus on her sleep apnea. She wears O2 at 2 to 4 L with sleep    Reviewed most recent imaging including chest x-ray obtained 7/10/2022 demonstrating left basilar atelectasis and/or consolidation.  Patient was treated with a course of antibiotics.    Sleep study obtained 1/28/2022 demonstrated mild TONYA with overall AHI of 14.5, events clustered around REM sleep, no supine sleep noted during study.  Low O2 sat of 83%.  O2 sat improved to low 87% with treatment.  Auto CPAP of 8 to 12 cm with small DreamWear mask was recommended.  Patient believes she has received equipment but has not had transportation to go .      Review  "of Systems   Constitutional: Positive for malaise/fatigue (MODERATE). Negative for chills, fever and weight loss.   HENT: Positive for tinnitus (INTERMITTENT ). Negative for congestion, hearing loss, nosebleeds, sinus pain and sore throat.    Eyes: Positive for redness.   Respiratory: Positive for cough, sputum production (CLEAR), shortness of breath and wheezing (OCCASIONAL).    Cardiovascular: Positive for palpitations and leg swelling (MILD). Negative for chest pain and orthopnea.   Gastrointestinal: Positive for heartburn.        NO DENTURES, MISSING SOME TEETH, SOME SWALLOWING ISSUES   Neurological: Positive for headaches (COMPLEX MIGRAINE ). Negative for dizziness and tremors.   Psychiatric/Behavioral: The patient does not have insomnia.        Past Medical History:   Diagnosis Date   • Abdominal pain    • Anginal syndrome     random chest pain especially with tachycardia   • Apnea, sleep    • Arrhythmia     \"sinus tachycardia\", cariologist, Dr. Kumar; ablation 2/2016   • Arthritis     osteo   • ASTHMA     when around smoke   • Back pain    • Borderline personality disorder (HCC)    • Breath shortness     with tachycardia   • Bronchitis 02/08/2022    Last time was 12/21   • Cardiac arrhythmia    • Chickenpox    • Chronic UTI 09/18/2010   • Cough    • Daytime sleepiness    • Dental disorder 03/08/2021    infected tooth   • Depression    • Diabetes (HCC)    • Diarrhea     incontinece   • Disorder of thyroid     Hashimoto's   • Fall    • Fatigue    • Frequent headaches    • Gasping for breath    • Gynecological disorder     PCOS   • Headache(784.0)    • Heart burn    • Heart murmur    • History of falling    • Indigestion    • Migraine    • Mitochondrial disease (HCC)    • Multiple personality disorder (HCC)    • Nausea    • Obesity    • Other fatigue 06/29/2020   • Pain 08/15/2012    back, 7/10   • Painful joint    • PCOS (polycystic ovarian syndrome)    • Pneumonia 2012 12/2020   • Psychosis (HCC)    • Ringing " "in ears    • Scoliosis    • Shortness of breath     O2 as needed   • Sinus tachycardia 10/31/2013   • Sleep apnea     CPAP \"pulmonary doctor took me off mid year 2016\"   • Snoring    • Tonsillitis    • 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HCC)     2/8/22: Per pt: not anymore   • Tuberculosis     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   • Urinary bladder disorder     Suprapubic cath. 2/8/22: Not anymore.    • Urinary incontinence    • Weakness    • Wears glasses        Past Surgical History:   Procedure Laterality Date   • ME LAP,DIAGNOSTIC ABDOMEN  2/14/2022    Procedure: LAPAROSCOPY;  Surgeon: Seamus Pisano M.D.;  Location: SURGERY SAME DAY [facility];  Service: Gynecology   • OVARIAN CYSTECTOMY Right 2/14/2022    Procedure: EXCISION, CYST, OVARY;  Surgeon: Seamus Pisano M.D.;  Location: SURGERY SAME DAY [facility];  Service: Gynecology   • BOWEL STIMULATOR INSERTION  3/10/2021    Procedure: INSERTION, ELECTRODE LEADS AND PULSE GENERATOR, NEUROSTIMULATOR, SACRAL - REMOVAL OF INTERSTIM WITH REPLACEMENT OF SACRAL NEUROMODULATION DEVICE;  Surgeon: Joe Noyola M.D.;  Location: SURGERY [facility];  Service: General   • MUSCLE BIOPSY Right 1/26/2017    Procedure: MUSCLE BIOPSY - THIGH;  Surgeon: Isidro Vigil M.D.;  Location: [facility];  Service:    • GASTROSCOPY WITH BALLOON DILATATION N/A 1/4/2017    Procedure: GASTROSCOPY WITH DILATATION;  Surgeon: Torres Vargas M.D.;  Location: [facility];  Service:    • BOWEL STIMULATOR INSERTION  7/13/2016    Procedure: BOWEL STIMULATOR INSERTION FOR PERMANENT INTERSTIM SACRAL IMPLANT;  Surgeon: Joe Noyola M.D.;  Location: SURGERY [facility];  Service:    • RECOVERY  1/27/2016    Procedure: CATH LAB EP STUDY WITH SINUS NODE MODIFICATION ABHINAV;  [facility];  [place] [facility]  Service:    • OTHER CARDIAC SURGERY  1/2016    cardiac ablation   • NEURO DEST FACET L/S W/IG SNGL  4/21/2015    Performed by Reza" Leander at SURGERY [facility]   • LUMBAR FUSION ANTERIOR  8/21/2012    Performed by SUSIE SAWANT at SURGERY [facility]   • ALYSSA BY LAPAROSCOPY  8/29/2010    Performed by SHAYY JOHNS at SURGERY [facility]   • LAMINOTOMY     • OTHER ABDOMINAL SURGERY     • TONSILLECTOMY      tonsillectomy       Family History   Problem Relation Age of Onset   • Hypertension Mother    • Sleep Apnea Mother    • Heart Disease Mother    • Other Mother         hypothryod   • Hypertension Maternal Uncle    • Heart Disease Maternal Grandmother    • Hypertension Maternal Grandmother    • No Known Problems Sister    • Other Sister         Narcolepsy;fibromyalsia;bone;nerve   • Genitourinary () Problems Sister         endometriosis       Social History     Socioeconomic History   • Marital status: Single     Spouse name: Not on file   • Number of children: Not on file   • Years of education: Not on file   • Highest education level: Not on file   Occupational History   • Not on file   Tobacco Use   • Smoking status: Never Smoker   • Smokeless tobacco: Never Used   Vaping Use   • Vaping Use: Never used   Substance and Sexual Activity   • Alcohol use: No     Alcohol/week: 0.0 oz   • Drug use: Not Currently     Frequency: 7.0 times per week     Types: Marijuana   • Sexual activity: Not Currently     Birth control/protection: Implant   Other Topics Concern   • Not on file   Social History Narrative    ** Merged History Encounter **          Social Determinants of Health     Financial Resource Strain: Not on file   Food Insecurity: Not on file   Transportation Needs: Not on file   Physical Activity: Not on file   Stress: Not on file   Social Connections: Not on file   Intimate Partner Violence: Not on file   Housing Stability: Not on file       Allergies as of 07/12/2022 - Reviewed 07/10/2022   Allergen Reaction Noted   • Depakote [divalproex sodium] Unspecified 06/14/2010   • Doxycycline Anaphylaxis and Vomiting  "08/15/2012   • Montelukast [singulair] Unspecified 04/27/2021   • Vancomycin Itching 07/10/2016   • Amitriptyline Unspecified 10/31/2013   • Aripiprazole [abilify] Unspecified 01/17/2013   • Clindamycin Nausea 02/02/2011   • Flomax [tamsulosin hydrochloride] Swelling 09/24/2009   • Metformin Unspecified 07/23/2013   • Tamsulosin Swelling 09/24/2009   • Tape Rash 08/15/2012   • Wound dressing adhesive Hives 01/12/2018   • Ampicillin Rash 01/11/2021   • Ciprofloxacin Rash 01/16/2020   • Keflex Rash 01/01/2017   • Levofloxacin Unspecified 10/27/2016   • Metronidazole Rash 03/31/2011   • Penicillins Hives 01/16/2020   • Sulfa drugs Itching and Myalgia 10/16/2009   • Valproic acid Rash 06/14/2010        @Vital signs for this encounter:  /84   Pulse 88   Resp 16   Ht 1.6 m (5' 3\")   Wt 108 kg (239 lb)   SpO2 95%     Current medications as of today   Current Outpatient Medications   Medication Sig Dispense Refill   • ipratropium-albuterol (DUONEB) 0.5-2.5 (3) MG/3ML nebulizer solution Take 3 mL by nebulization every four hours as needed for Shortness of Breath for up to 30 days. Nebulizer 540 mL 0   • cephALEXin (KEFLEX) 500 MG Cap Take 500 mg by mouth 3 times a day. Pt started a 14 day course of KEFLEX on 7/07     • diphenhydrAMINE (BENADRYL) 25 MG Tab Take 50 mg by mouth at bedtime as needed for Sleep.     • ziprasidone (GEODON) 40 MG Cap Take 40 mg by mouth at bedtime.     • valacyclovir (VALTREX) 1 GM Tab Take 1,000 mg by mouth 4 times a day. Pt started a 7 day course of VALTREX on 7/07     • ondansetron (ZOFRAN ODT) 4 MG TABLET DISPERSIBLE Take 1 Tablet by mouth every 6 hours as needed for Nausea. 10 Tablet 0   • SUMAtriptan (IMITREX) 50 MG Tab Take 1 Tablet by mouth 1 time a day as needed for Migraine. 10 Tablet 3   • ibuprofen (MOTRIN) 600 MG Tab Take 1 Tablet by mouth every 6 hours as needed. 30 Tablet 1   • lactulose 20 GM/30ML Solution Take 30 mL by mouth 2 times a day. 473 mL 0   • docusate sodium " (COLACE) 100 MG Cap Take 1 Capsule by mouth 2 times a day. 60 Capsule 0   • traZODone (DESYREL) 100 MG Tab Take 100 mg by mouth every evening.     • ivabradine (CORLANOR) 7.5 MG Tab tablet Take 7.5 mg by mouth every evening.     • Melatonin 10 MG Tab Take 10 mg by mouth every bedtime.     • albuterol 108 (90 Base) MCG/ACT Aero Soln inhalation aerosol Inhale 2 Puffs every 6 hours as needed for Shortness of Breath. 8.5 g 6     No current facility-administered medications for this visit.         Physical Exam:   Gen:           Alert and oriented, No apparent distress. Mood and affect appropriate, normal interaction with provider.  Eyes:          sclere white, conjunctive moist.  Hearing:     Grossly intact.  Dentition:    Fair dentition.  Oropharynx:   Tongue normal, posterior pharynx without erythema or exudate.  Neck:        Supple, trachea midline, no masses.  Respiratory Effort: No intercostal retractions or use of accessory muscles.   Lung Auscultation:      Decreased bases; no rales, rhonchi or wheezing.  CV:            Regular rate and rhythm. No edema. No murmurs, rubs or gallops.  Digits, Nails, Ext: No clubbing, cyanosis, petechiae, or nodes.   Skin:        No rashes, lesions or ulcers noted on exposed skin surfaces.                     Assessment:  1. Mild intermittent asthma without complication     2. TONYA (obstructive sleep apnea)     3. Morbidly obese (HCC)     4. PTSD (post-traumatic stress disorder)     5. Borderline personality disorder in adult ([facility])         Immunizations:    Flu: 1/3/2022  Pneumovax 23:  Prevnar 13: 9/6/2019  Jade SARS-CoV-2 vaccine: 7/3/2021  Moderna SARS-CoV-2 vaccine: 1/3/2022    Plan:  31-year-old female here to follow-up on mild intermittent asthma and TONYA.  She does have a psychiatric health history and is under current treatment.     Mild intermittent asthma: Reports seasonal increase in respiratory symptoms, unable to  her refill for DuoNeb, requesting prior  authorization.  Not on maintenance inhaler therapy per patient decision.      Elevated BMI: elevated BMI of 42.34 kg/m², gentle activity as tolerated and monitoring of nutritional intake.    TONYA: Patient believes she may have received her unit but has not initiated treatment due to not having picked up equipment.  Understands she will need follow-up appointment in 6 weeks or so after initiating therapy.  Reviewed equipment cleaning.    Follow-up in 6 weeks with sleep provider.    This dictation was created using voice recognition software. The accuracy of the dictation is limited to the abilities of the software. I expect there may be some errors of grammar and possibly content.

## 2022-07-26 ENCOUNTER — APPOINTMENT (OUTPATIENT)
Dept: RADIOLOGY | Facility: MEDICAL CENTER | Age: 32
End: 2022-07-26
Attending: EMERGENCY MEDICINE
Payer: MEDICARE

## 2022-07-26 ENCOUNTER — HOSPITAL ENCOUNTER (OUTPATIENT)
Facility: MEDICAL CENTER | Age: 32
End: 2022-07-28
Attending: EMERGENCY MEDICINE | Admitting: STUDENT IN AN ORGANIZED HEALTH CARE EDUCATION/TRAINING PROGRAM
Payer: MEDICARE

## 2022-07-26 DIAGNOSIS — Q79.60 EHLERS-DANLOS SYNDROME: ICD-10-CM

## 2022-07-26 DIAGNOSIS — G44.89 OTHER HEADACHE SYNDROME: ICD-10-CM

## 2022-07-26 DIAGNOSIS — R53.1 RIGHT SIDED WEAKNESS: ICD-10-CM

## 2022-07-26 DIAGNOSIS — H53.9 VISUAL DISTURBANCE: ICD-10-CM

## 2022-07-26 DIAGNOSIS — G43.409 HEMIPLEGIC MIGRAINE WITHOUT STATUS MIGRAINOSUS, NOT INTRACTABLE: ICD-10-CM

## 2022-07-26 LAB
ALBUMIN SERPL BCP-MCNC: 4.6 G/DL (ref 3.2–4.9)
ALBUMIN/GLOB SERPL: 2 G/DL
ALP SERPL-CCNC: 82 U/L (ref 30–99)
ALT SERPL-CCNC: 22 U/L (ref 2–50)
ANION GAP SERPL CALC-SCNC: 14 MMOL/L (ref 7–16)
AST SERPL-CCNC: 13 U/L (ref 12–45)
BASOPHILS # BLD AUTO: 0.4 % (ref 0–1.8)
BASOPHILS # BLD: 0.03 K/UL (ref 0–0.12)
BILIRUB SERPL-MCNC: 0.3 MG/DL (ref 0.1–1.5)
BUN SERPL-MCNC: 15 MG/DL (ref 8–22)
CALCIUM SERPL-MCNC: 9.6 MG/DL (ref 8.5–10.5)
CHLORIDE SERPL-SCNC: 112 MMOL/L (ref 96–112)
CO2 SERPL-SCNC: 12 MMOL/L (ref 20–33)
CREAT SERPL-MCNC: 0.8 MG/DL (ref 0.5–1.4)
EKG IMPRESSION: NORMAL
EOSINOPHIL # BLD AUTO: 0.13 K/UL (ref 0–0.51)
EOSINOPHIL NFR BLD: 1.6 % (ref 0–6.9)
ERYTHROCYTE [DISTWIDTH] IN BLOOD BY AUTOMATED COUNT: 43 FL (ref 35.9–50)
GFR SERPLBLD CREATININE-BSD FMLA CKD-EPI: 100 ML/MIN/1.73 M 2
GLOBULIN SER CALC-MCNC: 2.3 G/DL (ref 1.9–3.5)
GLUCOSE SERPL-MCNC: 135 MG/DL (ref 65–99)
HCT VFR BLD AUTO: 40.4 % (ref 37–47)
HGB BLD-MCNC: 13.9 G/DL (ref 12–16)
IMM GRANULOCYTES # BLD AUTO: 0.05 K/UL (ref 0–0.11)
IMM GRANULOCYTES NFR BLD AUTO: 0.6 % (ref 0–0.9)
LYMPHOCYTES # BLD AUTO: 2.12 K/UL (ref 1–4.8)
LYMPHOCYTES NFR BLD: 26.8 % (ref 22–41)
MCH RBC QN AUTO: 30.8 PG (ref 27–33)
MCHC RBC AUTO-ENTMCNC: 34.4 G/DL (ref 33.6–35)
MCV RBC AUTO: 89.4 FL (ref 81.4–97.8)
MONOCYTES # BLD AUTO: 0.66 K/UL (ref 0–0.85)
MONOCYTES NFR BLD AUTO: 8.3 % (ref 0–13.4)
NEUTROPHILS # BLD AUTO: 4.93 K/UL (ref 2–7.15)
NEUTROPHILS NFR BLD: 62.3 % (ref 44–72)
NRBC # BLD AUTO: 0 K/UL
NRBC BLD-RTO: 0 /100 WBC
PLATELET # BLD AUTO: 212 K/UL (ref 164–446)
PMV BLD AUTO: 11.3 FL (ref 9–12.9)
POTASSIUM SERPL-SCNC: 3.8 MMOL/L (ref 3.6–5.5)
PROT SERPL-MCNC: 6.9 G/DL (ref 6–8.2)
RBC # BLD AUTO: 4.52 M/UL (ref 4.2–5.4)
SODIUM SERPL-SCNC: 138 MMOL/L (ref 135–145)
TROPONIN T SERPL-MCNC: 7 NG/L (ref 6–19)
WBC # BLD AUTO: 7.9 K/UL (ref 4.8–10.8)

## 2022-07-26 PROCEDURE — 99285 EMERGENCY DEPT VISIT HI MDM: CPT

## 2022-07-26 PROCEDURE — 700117 HCHG RX CONTRAST REV CODE 255: Performed by: EMERGENCY MEDICINE

## 2022-07-26 PROCEDURE — 700111 HCHG RX REV CODE 636 W/ 250 OVERRIDE (IP): Performed by: EMERGENCY MEDICINE

## 2022-07-26 PROCEDURE — 70498 CT ANGIOGRAPHY NECK: CPT | Mod: MG

## 2022-07-26 PROCEDURE — 85025 COMPLETE CBC W/AUTO DIFF WBC: CPT

## 2022-07-26 PROCEDURE — 70496 CT ANGIOGRAPHY HEAD: CPT | Mod: MF

## 2022-07-26 PROCEDURE — 84484 ASSAY OF TROPONIN QUANT: CPT

## 2022-07-26 PROCEDURE — 96374 THER/PROPH/DIAG INJ IV PUSH: CPT | Mod: XU

## 2022-07-26 PROCEDURE — 71045 X-RAY EXAM CHEST 1 VIEW: CPT

## 2022-07-26 PROCEDURE — 93005 ELECTROCARDIOGRAM TRACING: CPT | Performed by: EMERGENCY MEDICINE

## 2022-07-26 PROCEDURE — 96375 TX/PRO/DX INJ NEW DRUG ADDON: CPT | Mod: XU

## 2022-07-26 PROCEDURE — 80053 COMPREHEN METABOLIC PANEL: CPT

## 2022-07-26 RX ORDER — DIPHENHYDRAMINE HYDROCHLORIDE 50 MG/ML
50 INJECTION INTRAMUSCULAR; INTRAVENOUS ONCE
Status: COMPLETED | OUTPATIENT
Start: 2022-07-26 | End: 2022-07-26

## 2022-07-26 RX ORDER — KETOROLAC TROMETHAMINE 30 MG/ML
15 INJECTION, SOLUTION INTRAMUSCULAR; INTRAVENOUS ONCE
Status: COMPLETED | OUTPATIENT
Start: 2022-07-26 | End: 2022-07-26

## 2022-07-26 RX ORDER — PROCHLORPERAZINE EDISYLATE 5 MG/ML
10 INJECTION INTRAMUSCULAR; INTRAVENOUS ONCE
Status: COMPLETED | OUTPATIENT
Start: 2022-07-26 | End: 2022-07-26

## 2022-07-26 RX ADMIN — DIPHENHYDRAMINE HYDROCHLORIDE 50 MG: 50 INJECTION INTRAMUSCULAR; INTRAVENOUS at 21:29

## 2022-07-26 RX ADMIN — KETOROLAC TROMETHAMINE 15 MG: 30 INJECTION, SOLUTION INTRAMUSCULAR; INTRAVENOUS at 21:29

## 2022-07-26 RX ADMIN — IOHEXOL 80 ML: 350 INJECTION, SOLUTION INTRAVENOUS at 23:25

## 2022-07-26 RX ADMIN — PROCHLORPERAZINE EDISYLATE 10 MG: 5 INJECTION INTRAMUSCULAR; INTRAVENOUS at 21:29

## 2022-07-26 ASSESSMENT — FIBROSIS 4 INDEX: FIB4 SCORE: 0.59

## 2022-07-27 PROBLEM — G43.409 HEMIPLEGIC MIGRAINE WITHOUT STATUS MIGRAINOSUS, NOT INTRACTABLE: Status: ACTIVE | Noted: 2022-07-27

## 2022-07-27 PROBLEM — G43.409 MIGRAINE, HEMIPLEGIC: Status: ACTIVE | Noted: 2022-07-27

## 2022-07-27 PROBLEM — K58.9 IBS (IRRITABLE BOWEL SYNDROME): Status: ACTIVE | Noted: 2022-07-27

## 2022-07-27 LAB — GLUCOSE BLD STRIP.AUTO-MCNC: 104 MG/DL (ref 65–99)

## 2022-07-27 PROCEDURE — G0378 HOSPITAL OBSERVATION PER HR: HCPCS

## 2022-07-27 PROCEDURE — 700105 HCHG RX REV CODE 258: Performed by: STUDENT IN AN ORGANIZED HEALTH CARE EDUCATION/TRAINING PROGRAM

## 2022-07-27 PROCEDURE — 99220 PR INITIAL OBSERVATION CARE,LEVL III: CPT | Performed by: STUDENT IN AN ORGANIZED HEALTH CARE EDUCATION/TRAINING PROGRAM

## 2022-07-27 PROCEDURE — 82962 GLUCOSE BLOOD TEST: CPT

## 2022-07-27 PROCEDURE — 96372 THER/PROPH/DIAG INJ SC/IM: CPT

## 2022-07-27 PROCEDURE — 700102 HCHG RX REV CODE 250 W/ 637 OVERRIDE(OP): Performed by: STUDENT IN AN ORGANIZED HEALTH CARE EDUCATION/TRAINING PROGRAM

## 2022-07-27 PROCEDURE — 96376 TX/PRO/DX INJ SAME DRUG ADON: CPT

## 2022-07-27 PROCEDURE — 700111 HCHG RX REV CODE 636 W/ 250 OVERRIDE (IP): Performed by: STUDENT IN AN ORGANIZED HEALTH CARE EDUCATION/TRAINING PROGRAM

## 2022-07-27 PROCEDURE — 700102 HCHG RX REV CODE 250 W/ 637 OVERRIDE(OP): Performed by: INTERNAL MEDICINE

## 2022-07-27 PROCEDURE — A9270 NON-COVERED ITEM OR SERVICE: HCPCS | Performed by: STUDENT IN AN ORGANIZED HEALTH CARE EDUCATION/TRAINING PROGRAM

## 2022-07-27 PROCEDURE — 96375 TX/PRO/DX INJ NEW DRUG ADDON: CPT

## 2022-07-27 PROCEDURE — A9270 NON-COVERED ITEM OR SERVICE: HCPCS | Performed by: INTERNAL MEDICINE

## 2022-07-27 RX ORDER — SUMATRIPTAN 25 MG/1
25 TABLET, FILM COATED ORAL
Status: DISCONTINUED | OUTPATIENT
Start: 2022-07-27 | End: 2022-07-28 | Stop reason: HOSPADM

## 2022-07-27 RX ORDER — SODIUM CHLORIDE 9 MG/ML
INJECTION, SOLUTION INTRAVENOUS CONTINUOUS
Status: ACTIVE | OUTPATIENT
Start: 2022-07-27 | End: 2022-07-28

## 2022-07-27 RX ORDER — ACETAMINOPHEN 325 MG/1
650 TABLET ORAL EVERY 6 HOURS PRN
Status: DISCONTINUED | OUTPATIENT
Start: 2022-07-27 | End: 2022-07-28 | Stop reason: HOSPADM

## 2022-07-27 RX ORDER — KETOROLAC TROMETHAMINE 30 MG/ML
15 INJECTION, SOLUTION INTRAMUSCULAR; INTRAVENOUS EVERY 6 HOURS
Status: DISCONTINUED | OUTPATIENT
Start: 2022-07-27 | End: 2022-07-28 | Stop reason: HOSPADM

## 2022-07-27 RX ORDER — METOCLOPRAMIDE HYDROCHLORIDE 5 MG/ML
10 INJECTION INTRAMUSCULAR; INTRAVENOUS EVERY 6 HOURS
Status: DISPENSED | OUTPATIENT
Start: 2022-07-27 | End: 2022-07-28

## 2022-07-27 RX ORDER — TRAZODONE HYDROCHLORIDE 50 MG/1
100 TABLET ORAL NIGHTLY
Status: DISCONTINUED | OUTPATIENT
Start: 2022-07-27 | End: 2022-07-28 | Stop reason: HOSPADM

## 2022-07-27 RX ORDER — ZIPRASIDONE HYDROCHLORIDE 40 MG/1
40 CAPSULE ORAL
Status: DISCONTINUED | OUTPATIENT
Start: 2022-07-27 | End: 2022-07-28 | Stop reason: HOSPADM

## 2022-07-27 RX ORDER — HEPARIN SODIUM 5000 [USP'U]/ML
5000 INJECTION, SOLUTION INTRAVENOUS; SUBCUTANEOUS EVERY 8 HOURS
Status: DISCONTINUED | OUTPATIENT
Start: 2022-07-27 | End: 2022-07-28 | Stop reason: HOSPADM

## 2022-07-27 RX ORDER — LACTULOSE 20 G/30ML
30 SOLUTION ORAL 2 TIMES DAILY
Status: DISCONTINUED | OUTPATIENT
Start: 2022-07-27 | End: 2022-07-28 | Stop reason: HOSPADM

## 2022-07-27 RX ADMIN — LACTULOSE 30 ML: 20 SOLUTION ORAL at 05:23

## 2022-07-27 RX ADMIN — HEPARIN SODIUM 5000 UNITS: 5000 INJECTION, SOLUTION INTRAVENOUS; SUBCUTANEOUS at 15:02

## 2022-07-27 RX ADMIN — HEPARIN SODIUM 5000 UNITS: 5000 INJECTION, SOLUTION INTRAVENOUS; SUBCUTANEOUS at 20:02

## 2022-07-27 RX ADMIN — LACTULOSE 30 ML: 20 SOLUTION ORAL at 17:51

## 2022-07-27 RX ADMIN — KETOROLAC TROMETHAMINE 15 MG: 30 INJECTION, SOLUTION INTRAMUSCULAR; INTRAVENOUS at 05:23

## 2022-07-27 RX ADMIN — KETOROLAC TROMETHAMINE 15 MG: 30 INJECTION, SOLUTION INTRAMUSCULAR; INTRAVENOUS at 17:53

## 2022-07-27 RX ADMIN — SUMATRIPTAN SUCCINATE 25 MG: 25 TABLET ORAL at 19:49

## 2022-07-27 RX ADMIN — METOCLOPRAMIDE 10 MG: 5 INJECTION, SOLUTION INTRAMUSCULAR; INTRAVENOUS at 05:23

## 2022-07-27 RX ADMIN — METOCLOPRAMIDE 10 MG: 5 INJECTION, SOLUTION INTRAMUSCULAR; INTRAVENOUS at 17:53

## 2022-07-27 RX ADMIN — IVABRADINE 7.5 MG: 7.5 TABLET, FILM COATED ORAL at 22:14

## 2022-07-27 RX ADMIN — TRAZODONE HYDROCHLORIDE 100 MG: 50 TABLET ORAL at 20:02

## 2022-07-27 RX ADMIN — KETOROLAC TROMETHAMINE 15 MG: 30 INJECTION, SOLUTION INTRAMUSCULAR; INTRAVENOUS at 13:09

## 2022-07-27 RX ADMIN — HEPARIN SODIUM 5000 UNITS: 5000 INJECTION, SOLUTION INTRAVENOUS; SUBCUTANEOUS at 05:23

## 2022-07-27 RX ADMIN — SODIUM CHLORIDE: 9 INJECTION, SOLUTION INTRAVENOUS at 17:51

## 2022-07-27 RX ADMIN — ZIPRASIDONE HYDROCHLORIDE 40 MG: 40 CAPSULE ORAL at 20:02

## 2022-07-27 RX ADMIN — METOCLOPRAMIDE 10 MG: 5 INJECTION, SOLUTION INTRAMUSCULAR; INTRAVENOUS at 13:08

## 2022-07-27 RX ADMIN — SUMATRIPTAN SUCCINATE 25 MG: 25 TABLET ORAL at 17:50

## 2022-07-27 ASSESSMENT — ENCOUNTER SYMPTOMS
PSYCHIATRIC NEGATIVE: 1
EYE PAIN: 1
EYE REDNESS: 0
COUGH: 0
HEADACHES: 1
DIZZINESS: 0
DOUBLE VISION: 0
BLURRED VISION: 1
WEAKNESS: 1
FLANK PAIN: 0
MUSCULOSKELETAL NEGATIVE: 1
MYALGIAS: 1
PHOTOPHOBIA: 0
SEIZURES: 0
FOCAL WEAKNESS: 1
MEMORY LOSS: 0
EYE PAIN: 0
RESPIRATORY NEGATIVE: 1
PALPITATIONS: 0
NERVOUS/ANXIOUS: 0
VOMITING: 0
EYE DISCHARGE: 0
GASTROINTESTINAL NEGATIVE: 1
ABDOMINAL PAIN: 0
NAUSEA: 0
CHILLS: 0
SHORTNESS OF BREATH: 0
DEPRESSION: 0
SPEECH CHANGE: 0
CARDIOVASCULAR NEGATIVE: 1
SENSORY CHANGE: 0
WEAKNESS: 0
FEVER: 0
BACK PAIN: 0
DIAPHORESIS: 0

## 2022-07-27 ASSESSMENT — LIFESTYLE VARIABLES
EVER FELT BAD OR GUILTY ABOUT YOUR DRINKING: NO
HAVE YOU EVER FELT YOU SHOULD CUT DOWN ON YOUR DRINKING: NO
TOTAL SCORE: 0
ON A TYPICAL DAY WHEN YOU DRINK ALCOHOL HOW MANY DRINKS DO YOU HAVE: 0
CONSUMPTION TOTAL: NEGATIVE
AVERAGE NUMBER OF DAYS PER WEEK YOU HAVE A DRINK CONTAINING ALCOHOL: 0
TOTAL SCORE: 0
HOW MANY TIMES IN THE PAST YEAR HAVE YOU HAD 5 OR MORE DRINKS IN A DAY: 0
ALCOHOL_USE: NO
HAVE PEOPLE ANNOYED YOU BY CRITICIZING YOUR DRINKING: NO
EVER HAD A DRINK FIRST THING IN THE MORNING TO STEADY YOUR NERVES TO GET RID OF A HANGOVER: NO
TOTAL SCORE: 0

## 2022-07-27 ASSESSMENT — PAIN DESCRIPTION - PAIN TYPE
TYPE: ACUTE PAIN
TYPE: ACUTE PAIN

## 2022-07-27 ASSESSMENT — FIBROSIS 4 INDEX: FIB4 SCORE: 0.42

## 2022-07-27 NOTE — ED NOTES
Transport at bedside. RN attempted to give report to admitting RN multiple times without success. Pt transported to floor.

## 2022-07-27 NOTE — ASSESSMENT & PLAN NOTE
Patient has symptoms that likely represent a complex migraine.  She has pressure and fullness in her head, fluctuations in visual spots, numbness in her bilateral upper extremities, right upper and lower extremity weakness.  Symptoms are not typical of a CVA.  Patient has an unknown device placed in her back that she is not sure that is compatible with an MRI.  Consider MRI brain without contrast if compatible. Out of window at time of presentation  Reglan Toradol and fluids  CT head, angio head and neck shows no acute intracranial abnormality  PT OT  - pain control  Passed SLP eval, initiate diet  Consider neuro consult if ongoing sx

## 2022-07-27 NOTE — ED TRIAGE NOTES
"Chief Complaint   Patient presents with   • Vision Change     Since 0730, patient c/o \"Visual tracing. I keep seeing photocopies of things even when I move it.\"    • Headache     Since 0830, progressively increasing \"fullness in my head. It doesn't hurt, it just feels full.\"   • Pain     Since 1230, \"My hands and face feel like they are on fire.\"   • Unilateral Weakness     Right arm and leg feel weak per patient since 1700.     ED Triage Vitals [07/26/22 1652]   Enc Vitals Group      Blood Pressure (!) 163/96      Pulse (!) 117      Respiration 16      Temperature 36.6 °C (97.8 °F)      Temp src Oral      Pulse Oximetry 99 %      Weight 110 kg (242 lb 8.1 oz)      Height 1.6 m (5' 3\")     "

## 2022-07-27 NOTE — ED NOTES
Pt resting in room, easy, equal chest rise and fall. Pt remains calm and cooperative. Pt's grandmother at bedside. Grandmother brought Pt's Corlanor medication and MRI device, informed ED pharmacy of medication.

## 2022-07-27 NOTE — ED NOTES
Pt asking why she is still NPO. Reasoning unclear at this time with RN, RN contacted admitting hospitalist regarding NPO status. Awaiting response at this time.

## 2022-07-27 NOTE — ED NOTES
PT was RV@2010. Apologized for wait times and thanked them for their patience. Advised them to please let us know if anything changes in their condition.

## 2022-07-27 NOTE — PROGRESS NOTES
4 Eyes Skin Assessment Completed by Harry Odell, SONYA and Jesenia Barrientos, SONYA.    Head WDL  Ears WDL  Nose WDL  Mouth WDL  Neck WDL  Breast/Chest WDL  Shoulder Blades WDL  Spine WDL  (R) Arm/Elbow/Hand WDL  (L) Arm/Elbow/Hand WDL  Abdomen WDL  Groin WDL  Scrotum/Coccyx/Buttocks WDL  (R) Leg WDL  (L) Leg WDL  (R) Heel/Foot/Toe WDL  (L) Heel/Foot/Toe WDL          Devices In Places Pulse Ox      Interventions In Place N/A    Possible Skin Injury No    Pictures Uploaded Into Epic N/A  Wound Consult Placed N/A  RN Wound Prevention Protocol Ordered No

## 2022-07-27 NOTE — ED NOTES
Pt sleeping in bed, equal chest rise and fall, airway patent, rr even and unlabored, nad noted. Call light within reach.

## 2022-07-27 NOTE — PROGRESS NOTES
Hospital Medicine Daily Progress Note    Date of Service  7/27/2022    Chief Complaint  Kristin Balderrama is a 31 y.o. female admitted 7/26/2022 with complex Migraine h/a with associated visual symptoms and hemiplegia.    Hospital Course  She has been continued on home medication as well as pain control.  CTA of the head and neck with no acute abnormalities.  Interval Problem Update  She has been continued on home medication as well as pain control.  Headache improving.  She has passed speech therapy evaluation and will be started on a diet.    Patient has ongoing generalized headache with associated blurry vision and right-sided weakness.  Patient reports prior history of migraine with associated blurry vision or weakness.  Headaches are resolved with Imitrex, however she has ran out of medications.  CTA of the head and neck within normal limits          I have discussed this patient's plan of care and discharge plan at IDT rounds today with Case Management, Nursing, Nursing leadership, and other members of the IDT team.    Consultants/Specialty  None    Code Status  Full Code    Disposition  Patient is not medically cleared for discharge.   Anticipate discharge to to home with close outpatient follow-up.  I have placed the appropriate orders for post-discharge needs.    Review of Systems  Review of Systems   Constitutional: Negative for chills, diaphoresis, fever and malaise/fatigue.   HENT: Negative for congestion and hearing loss.    Eyes: Positive for blurred vision. Negative for double vision, photophobia, pain, discharge and redness.   Respiratory: Negative for cough and shortness of breath.    Cardiovascular: Negative for chest pain, palpitations and leg swelling.   Gastrointestinal: Negative for abdominal pain, nausea and vomiting.   Genitourinary: Negative for dysuria and flank pain.   Musculoskeletal: Positive for myalgias. Negative for back pain and joint pain.   Neurological: Positive for focal  weakness and headaches. Negative for dizziness, sensory change, speech change, seizures and weakness.   Psychiatric/Behavioral: Negative for depression and memory loss. The patient is not nervous/anxious.         Physical Exam  Temp:  [36.4 °C (97.5 °F)-37.3 °C (99.1 °F)] 36.4 °C (97.5 °F)  Pulse:  [] 96  Resp:  [15-26] 16  BP: (118-163)/(63-97) 135/74  SpO2:  [90 %-99 %] 94 %    Physical Exam  Vitals and nursing note reviewed.   Constitutional:       General: She is not in acute distress.     Appearance: She is ill-appearing. She is not toxic-appearing or diaphoretic.      Comments: Morbid obesity   HENT:      Head: Normocephalic and atraumatic.      Nose: Nose normal.      Mouth/Throat:      Mouth: Mucous membranes are moist.   Eyes:      General: No scleral icterus.        Right eye: No discharge.         Left eye: No discharge.      Extraocular Movements: Extraocular movements intact.      Conjunctiva/sclera: Conjunctivae normal.      Pupils: Pupils are equal, round, and reactive to light.   Neck:      Thyroid: No thyromegaly.      Vascular: No JVD.   Cardiovascular:      Rate and Rhythm: Normal rate.      Heart sounds: No murmur heard.  Pulmonary:      Effort: No respiratory distress.      Breath sounds: Normal breath sounds. No wheezing.   Abdominal:      General: Bowel sounds are normal. There is no distension.      Palpations: Abdomen is soft.      Tenderness: There is no abdominal tenderness. There is no guarding.   Musculoskeletal:         General: No swelling or tenderness.      Cervical back: Neck supple.   Skin:     General: Skin is warm and dry.      Findings: No erythema or rash.   Neurological:      Mental Status: She is alert and oriented to person, place, and time.      Cranial Nerves: No cranial nerve deficit.      Sensory: Sensory deficit present.      Motor: Weakness present.      Coordination: Coordination abnormal.   Psychiatric:         Behavior: Behavior normal.         Thought  Content: Thought content normal.         Fluids  No intake or output data in the 24 hours ending 07/27/22 1323    Laboratory  Recent Labs     07/26/22 1929   WBC 7.9   RBC 4.52   HEMOGLOBIN 13.9   HEMATOCRIT 40.4   MCV 89.4   MCH 30.8   MCHC 34.4   RDW 43.0   PLATELETCT 212   MPV 11.3     Recent Labs     07/26/22 1929   SODIUM 138   POTASSIUM 3.8   CHLORIDE 112   CO2 12*   GLUCOSE 135*   BUN 15   CREATININE 0.80   CALCIUM 9.6                   Imaging  CT-CTA HEAD WITH & W/O-POST PROCESS   Final Result         1.  No large vessel occlusion or aneurysm identified      CT-CTA NECK WITH & W/O-POST PROCESSING   Final Result         1.  CT angiogram of the neck within normal limits.         DX-CHEST-PORTABLE (1 VIEW)   Final Result         1.  No acute cardiopulmonary disease.           Assessment/Plan  * Migraine, hemiplegic  Assessment & Plan  Patient has symptoms that likely represent a complex migraine.  She has pressure and fullness in her head, fluctuations in visual spots, numbness in her bilateral upper extremities, right upper and lower extremity weakness.  Symptoms are not typical of a CVA.  Patient has an unknown device placed in her back that she is not sure that is compatible with an MRI.  Consider MRI brain without contrast if compatible. Out of window at time of presentation  Reglan Toradol and fluids  CT head, angio head and neck shows no acute intracranial abnormality  PT OT  - pain control  Passed SLP eval, initiate diet  Consider neuro consult if ongoing sx      IBS (irritable bowel syndrome)  Assessment & Plan  Continue lactulose    Schizoaffective disorder, depressive type (HCC)- (present on admission)  Assessment & Plan  Continue trazodone and geodon    Morbidly obese (HCC)- (present on admission)  Assessment & Plan  Will need counseling when clinically appropriate        VTE prophylaxis: heparin ppx    I have performed a physical exam and reviewed and updated ROS and Plan today (7/27/2022). In  review of yesterday's note (7/26/2022), there are no changes except as documented above.

## 2022-07-27 NOTE — ED NOTES
Pt up to bedside commode without new incident or distress. Pt able to scoot over, right sided weakness still noted.

## 2022-07-27 NOTE — ED NOTES
CT unable to use iv in place in forearm for CTA. Ultrasound rn notified, will attempt second access.

## 2022-07-27 NOTE — H&P
"Hospital Medicine History & Physical Note    Date of Service  7/27/2022    Primary Care Physician  Torres Brody M.D.    Consultants  Neurology     Code Status  Full Code    Chief Complaint  Chief Complaint   Patient presents with   • Vision Change     Since 0730, patient c/o \"Visual tracing. I keep seeing photocopies of things even when I move it.\"    • Headache     Since 0830, progressively increasing \"fullness in my head. It doesn't hurt, it just feels full.\"   • Pain     Since 1230, \"My hands and face feel like they are on fire.\"   • Unilateral Weakness     Right arm and leg feel weak per patient since 1700.       History of Presenting Illness  Kristin Balderrama is a 31 y.o. female who presented 7/26/2022 with visual changes and the sensation of pressure in her head that started this morning at around 8.  She states that her vision will fluctuate, it became blurry, and returned, and she was seeing visual spots.  She began to notice a burning sensation in her upper extremities bilaterally and began to have right upper and lower extremity weakness as well as a right facial droop.  She called her ophthalmologist who recommended her to come into the ED for evaluation.    Patient has a history of complex migraines.  She denies tobacco alcohol and illicit drug use.  She was suspected to have a stroke in the past but was unable to have an MRI as she has a device planted in her back that assist her with bowel movements.    In the ED, patient found to have normal vital signs. Remarkable labs include NAGMA. CT shows partially empty sella, but no evidence of acute intracranial pathology. CT angio head and neck  Negative.     I discussed the plan of care with patient.    Review of Systems  Review of Systems   Constitutional: Positive for malaise/fatigue.   HENT: Negative.    Eyes: Positive for pain.   Respiratory: Negative.    Cardiovascular: Negative.    Gastrointestinal: Negative.    Genitourinary: Negative.  "   Musculoskeletal: Negative.    Skin: Negative.    Neurological: Positive for focal weakness and weakness.   Endo/Heme/Allergies: Negative.    Psychiatric/Behavioral: Negative.        Past Medical History   has a past medical history of Abdominal pain, Anginal syndrome, Apnea, sleep, Arrhythmia, Arthritis, ASTHMA, Back pain, Borderline personality disorder (HCC), Breath shortness, Bronchitis (02/08/2022), Cardiac arrhythmia, Chickenpox, Chronic UTI (09/18/2010), Cough, Daytime sleepiness, Dental disorder (03/08/2021), Depression, Diabetes ([facility]), Diarrhea, Disorder of thyroid, Fall, Fatigue, Frequent headaches, Gasping for breath, Gynecological disorder, Headache(784.0), Heart burn, Heart murmur, History of falling, Indigestion, Migraine, 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[facility]), 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    Surgical History   has a past surgical history that includes neuro dest facet l/s w/ig sngl (4/21/2015); recovery (1/27/2016); del[place] by laparoscopy (8/29/2010); lumbar fusion anterior (8/21/2012); other cardiac surgery (1/2016); tonsillectomy; bowel stimulator insertion (7/13/2016); gastroscopy with balloon dilatation (N/A, 1/4/2017); muscle biopsy (Right, 1/26/2017); other abdominal surgery; laminotomy; bowel stimulator insertion (3/10/2021); pr lap,diagnostic abdomen (2/14/2022); and ovarian cystectomy (Right, 2/14/2022).     Family History  family history includes Genitourinary () Problems in her sister; Heart Disease in her maternal grandmother and mother; Hypertension in her maternal grandmother, maternal uncle, and mother;  "No Known Problems in her sister; Other in her mother and sister; Sleep Apnea in her mother.   Family history reviewed with patient. There is no family history that is pertinent to the chief complaint.     Social History   reports that she has never smoked. She has never used smokeless tobacco. She reports previous drug use. Frequency: 7.00 times per week. Drug: Marijuana. She reports that she does not drink alcohol.    Allergies  Allergies   Allergen Reactions   • Depakote [Divalproex Sodium] Unspecified     Muscle spasms/muscle pain and weakness     • Doxycycline Anaphylaxis and Vomiting     Other reaction(s): pustules/blisters   • Montelukast [Singulair] Unspecified     Cardiac effusion   • Vancomycin Itching     Pt becomes flushed in face and chest.   RXN=7/10/16   • Amitriptyline Unspecified     Headaches     • Aripiprazole [Abilify] Unspecified     Headaches/muscle twitching     • Clindamycin Nausea          • Flomax [Tamsulosin Hydrochloride] Swelling   • Metformin Unspecified     Increased lactic acid     Other reaction(s): itching and rash/nausea vomiting   • Tamsulosin Swelling     Swelling of legs   • Tape Rash     Tears skin off  coban with Tegaderm tape ok intermittently  RXN=ongoing   • Wound Dressing Adhesive Hives     By pt report   • Ampicillin Rash     Pt reports that she received a rash    • Ciprofloxacin Rash         • Keflex Rash     Pt states she gets a rash with this medication  Tolerates ceftriaxone  Can take with Benadryl   • Levofloxacin Unspecified     Leg muscle cramps   • Metronidazole Rash     \"Vision problems\"   • Penicillins Hives     Can take with Benadryl   • Sulfa Drugs Itching and Myalgia     Muscle pain and weakness   • Valproic Acid Rash     .       Medications  Prior to Admission Medications   Prescriptions Last Dose Informant Patient Reported? Taking?   Melatonin 10 MG Tab  Patient Yes No   Sig: Take 10 mg by mouth every bedtime.   SUMAtriptan (IMITREX) 50 MG Tab  Patient No No "   Sig: Take 1 Tablet by mouth 1 time a day as needed for Migraine.   albuterol 108 (90 Base) MCG/ACT Aero Soln inhalation aerosol  Patient No No   Sig: Inhale 2 Puffs every 6 hours as needed for Shortness of Breath.   cephALEXin (KEFLEX) 500 MG Cap  Patient Yes No   Sig: Take 500 mg by mouth 3 times a day. Pt started a 14 day course of KEFLEX on 7/07   diphenhydrAMINE (BENADRYL) 25 MG Tab  Patient Yes No   Sig: Take 50 mg by mouth at bedtime as needed for Sleep.   docusate sodium (COLACE) 100 MG Cap  Patient No No   Sig: Take 1 Capsule by mouth 2 times a day.   ibuprofen (MOTRIN) 600 MG Tab  Patient No No   Sig: Take 1 Tablet by mouth every 6 hours as needed.   ipratropium-albuterol (DUONEB) 0.5-2.5 (3) MG/3ML nebulizer solution   No No   Sig: Take 3 mL by nebulization every four hours as needed for Shortness of Breath for up to 30 days. Nebulizer   ivabradine (CORLANOR) 7.5 MG Tab tablet  Patient Yes No   Sig: Take 7.5 mg by mouth every evening.   lactulose 20 GM/30ML Solution  Patient No No   Sig: Take 30 mL by mouth 2 times a day.   ondansetron (ZOFRAN ODT) 4 MG TABLET DISPERSIBLE  Patient No No   Sig: Take 1 Tablet by mouth every 6 hours as needed for Nausea.   traZODone (DESYREL) 100 MG Tab  Patient Yes No   Sig: Take 100 mg by mouth every evening.   valacyclovir (VALTREX) 1 GM Tab  Patient Yes No   Sig: Take 1,000 mg by mouth 4 times a day. Pt started a 7 day course of VALTREX on 7/07   ziprasidone (GEODON) 40 MG Cap  Patient Yes No   Sig: Take 40 mg by mouth at bedtime.      Facility-Administered Medications: None       Physical Exam  Temp:  [36.6 °C (97.8 °F)-37.3 °C (99.1 °F)] 37.3 °C (99.1 °F)  Pulse:  [] 80  Resp:  [16-20] 19  BP: (118-163)/(68-97) 126/79  SpO2:  [92 %-99 %] 93 %  Blood Pressure: 126/79   Temperature: 37.3 °C (99.1 °F)   Pulse: 80   Respiration: 19   Pulse Oximetry: 93 %       Physical Exam  Constitutional:       Appearance: Normal appearance. She is obese.   HENT:      Head:  Normocephalic.      Nose: Nose normal.      Mouth/Throat:      Mouth: Mucous membranes are moist.   Eyes:      Extraocular Movements: Extraocular movements intact.      Pupils: Pupils are equal, round, and reactive to light.   Cardiovascular:      Rate and Rhythm: Normal rate and regular rhythm.      Pulses: Normal pulses.   Pulmonary:      Effort: Pulmonary effort is normal.      Breath sounds: Normal breath sounds.   Abdominal:      General: Abdomen is flat. Bowel sounds are normal.      Palpations: Abdomen is soft.   Musculoskeletal:         General: Normal range of motion.      Cervical back: Normal range of motion.   Neurological:      Mental Status: She is alert. Mental status is at baseline.      Comments: No aphasia noted  No tongue deviation  No dysdiadochokinesia  Finger-to-nose test within normal  Mild right sided facial droop   Noted to have RUE And RLE weakness 2/5  LUE and LLE 5/5   Psychiatric:         Mood and Affect: Mood normal.         Laboratory:  Recent Labs     07/26/22 1929   WBC 7.9   RBC 4.52   HEMOGLOBIN 13.9   HEMATOCRIT 40.4   MCV 89.4   MCH 30.8   MCHC 34.4   RDW 43.0   PLATELETCT 212   MPV 11.3     Recent Labs     07/26/22 1929   SODIUM 138   POTASSIUM 3.8   CHLORIDE 112   CO2 12*   GLUCOSE 135*   BUN 15   CREATININE 0.80   CALCIUM 9.6     Recent Labs     07/26/22 1929   ALTSGPT 22   ASTSGOT 13   ALKPHOSPHAT 82   TBILIRUBIN 0.3   GLUCOSE 135*         No results for input(s): NTPROBNP in the last 72 hours.      Recent Labs     07/26/22 1929   TROPONINT 7       Imaging:  CT-CTA HEAD WITH & W/O-POST PROCESS   Final Result         1.  No large vessel occlusion or aneurysm identified      CT-CTA NECK WITH & W/O-POST PROCESSING   Final Result         1.  CT angiogram of the neck within normal limits.         DX-CHEST-PORTABLE (1 VIEW)   Final Result         1.  No acute cardiopulmonary disease.          no X-Ray or EKG requiring interpretation    Assessment/Plan:  Justification for  Admission Status  I anticipate this patient is appropriate for observation status at this time because Patient has symptoms that likely is from complex migraine.  Possible improvement soon and may be discharged tomorrow.        * Migraine, hemiplegic  Assessment & Plan  Patient has symptoms that likely represent a complex migraine.  She has pressure and fullness in her head, fluctuations in visual spots, numbness in her bilateral upper extremities, right upper and lower extremity weakness.  Symptoms are not typical of a CVA.  Patient has an unknown device placed in her back that she is not sure that is compatible with an MRI.  Consider MRI brain without contrast if compatible. Out of window at time of presentation  Reglan Toradol and fluids  CT head, angio head and neck shows no acute intracranial abnormality  PT OT      IBS (irritable bowel syndrome)  Assessment & Plan  Continue lactulose    Schizoaffective disorder, depressive type (HCC)- (present on admission)  Assessment & Plan  Continue trazodone and geodon    Morbidly obese (HCC)- (present on admission)  Assessment & Plan  Will need counseling when clinically appropriate       VTE prophylaxis: heparin ppx

## 2022-07-27 NOTE — ED PROVIDER NOTES
"ED Provider Note     Scribed for Sonal Bryant D.O. by Jeremiah Jeff. 7/26/2022, 8:45 PM.     Primary care provider: Torres Brody M.D.  Means of arrival: Walk-in         History obtained from: Patient  History limited by: None    CHIEF COMPLAINT  Chief Complaint   Patient presents with   • Vision Change     Since 0730, patient c/o \"Visual tracing. I keep seeing photocopies of things even when I move it.\"    • Headache     Since 0830, progressively increasing \"fullness in my head. It doesn't hurt, it just feels full.\"   • Pain     Since 1230, \"My hands and face feel like they are on fire.\"   • Unilateral Weakness     Right arm and leg feel weak per patient since 1700.     HPI  Kristin Balderrama is a 31 y.o. female who presents to the emergency Department for acute, mild vision change onset 7:30 AM this morning. Per patient, she had \"visual tracing\" and could see \"photocopies of her hands and computer screen.\" Both of her eyes then turned blurry. She says her bilateral hands \"are on fire.\" Her head then began feeling \"full.\" At 3 PM, her face began tingling. She called an ophthalmologist and was told to come to the ED for further evaluation. In the leaving room, her right side began feeling weak. She has a history of complex migraine and fluid in the brain that damaged her right eye but she says the damage wasn't as extensive. Denies fever. No alleviating or exacerbating factors reported. She is allergic to most antibiotics. She has a history of SVT, tachycardic, and angina. She had an ablation done in 2016.     REVIEW OF SYSTEMS  Pertinent positives include vision change, blurry vision, headache, tingling, and right-sided weakness. Pertinent negatives include no fever.   See HPI for further details. All other systems are negative.    PAST MEDICAL HISTORY  Past Medical History:   Diagnosis Date   • Abdominal pain    • Anginal syndrome     random chest pain especially with tachycardia   • Apnea, sleep    • " "Arrhythmia     \"sinus tachycardia\", cariologist, Dr. Kumar; ablation 2/2016   • Arthritis     osteo   • ASTHMA     when around smoke   • Back pain    • Borderline personality disorder (HCC)    • Breath shortness     with tachycardia   • Bronchitis 02/08/2022    Last time was 12/21   • Cardiac arrhythmia    • Chickenpox    • Chronic UTI 09/18/2010   • Cough    • Daytime sleepiness    • Dental disorder 03/08/2021    infected tooth   • Depression    • Diabetes (HCC)    • Diarrhea     incontinece   • Disorder of thyroid     Hashimoto's   • Fall    • Fatigue    • Frequent headaches    • Gasping for breath    • Gynecological disorder     PCOS   • Headache(784.0)    • Heart burn    • Heart murmur    • History of falling    • Indigestion    • Migraine    • Mitochondrial disease (HCC)    • Multiple personality disorder (HCC)    • Nausea    • Obesity    • Other fatigue 06/29/2020   • Pain 08/15/2012    back, 7/10   • Painful joint    • PCOS (polycystic ovarian syndrome)    • Pneumonia 2012 12/2020   • Psychosis (HCC)    • Ringing in ears    • Scoliosis    • Shortness of breath     O2 as needed   • Sinus tachycardia 10/31/2013   • Sleep apnea     CPAP \"pulmonary doctor took me off mid year 2016\"   • Snoring    • Tonsillitis    • 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HCC)     2/8/22: Per pt: not anymore   • Tuberculosis     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   • Urinary bladder disorder     Suprapubic cath. 2/8/22: Not anymore.    • Urinary incontinence    • Weakness    • Wears glasses        FAMILY HISTORY  Family History   Problem Relation Age of Onset   • Hypertension Mother    • Sleep Apnea Mother    • Heart Disease Mother    • Other Mother         hypothryod   • Hypertension Maternal Uncle    • Heart Disease Maternal Grandmother    • Hypertension Maternal Grandmother    • No Known Problems Sister    • Other Sister         Narcolepsy;fibromyalsia;bone;nerve   • Genitourinary () Problems Sister         endometriosis       SOCIAL " HISTORY  Social History     Tobacco Use   • Smoking status: Never Smoker   • Smokeless tobacco: Never Used   Vaping Use   • Vaping Use: Never used   Substance Use Topics   • Alcohol use: No     Alcohol/week: 0.0 oz   • Drug use: Not Currently     Frequency: 7.0 times per week     Types: Marijuana      Social History     Substance and Sexual Activity   Drug Use Not Currently   • Frequency: 7.0 times per week   • Types: Marijuana       SURGICAL HISTORY  Past Surgical History:   Procedure Laterality Date   • AL LAP,DIAGNOSTIC ABDOMEN  2/14/2022    Procedure: LAPAROSCOPY;  Surgeon: Seamus Pisano M.D.;  Location: SURGERY SAME DAY [facility];  Service: Gynecology   • OVARIAN CYSTECTOMY Right 2/14/2022    Procedure: EXCISION, CYST, OVARY;  Surgeon: Seamus Pisano M.D.;  Location: SURGERY SAME DAY [facility];  Service: Gynecology   • BOWEL STIMULATOR INSERTION  3/10/2021    Procedure: INSERTION, ELECTRODE LEADS AND PULSE GENERATOR, NEUROSTIMULATOR, SACRAL - REMOVAL OF INTERSTIM WITH REPLACEMENT OF SACRAL NEUROMODULATION DEVICE;  Surgeon: Joe Noyola M.D.;  Location: SURGERY [facility];  Service: General   • MUSCLE BIOPSY Right 1/26/2017    Procedure: MUSCLE BIOPSY - THIGH;  Surgeon: Isidro Vigil M.D.;  Location: SURGERY [facility];  Service:    • GASTROSCOPY WITH BALLOON DILATATION N/A 1/4/2017    Procedure: GASTROSCOPY WITH DILATATION;  Surgeon: Torres Vargas M.D.;  Location: [facility];  Service:    • BOWEL STIMULATOR INSERTION  7/13/2016    Procedure: BOWEL STIMULATOR INSERTION FOR PERMANENT INTERSTIM SACRAL IMPLANT;  Surgeon: Joe Noyola M.D.;  Location: SURGERY [facility];  Service:    • RECOVERY  1/27/2016    Procedure: CATH LAB EP STUDY WITH SINUS NODE MODIFICATION ABHINAV;  [facility]  Location: SURGERY PRE-POST PROC UNIT [facility] [place];  Service:    • OTHER CARDIAC SURGERY  1/2016    cardiac ablation   • NEURO DEST FACET L/S W/IG SNGL  4/21/2015    Performed by Reza Tabor  at SURGERY [facility]   • LUMBAR FUSION ANTERIOR  8/21/2012    Performed by SUSIE SAWANT at SURGERY [facility]S   • ALYSSA BY LAPAROSCOPY  8/29/2010    Performed by SHAYY JOHNS at SURGERY [facility]S   • LAMINOTOMY     • OTHER ABDOMINAL SURGERY     • TONSILLECTOMY      tonsillectomy       CURRENT MEDICATIONS  No current facility-administered medications for this encounter.    Current Outpatient Medications:   •  ipratropium-albuterol (DUONEB) 0.5-2.5 (3) MG/3ML nebulizer solution, Take 3 mL by nebulization every four hours as needed for Shortness of Breath for up to 30 days. Nebulizer, Disp: 540 mL, Rfl: 0  •  cephALEXin (KEFLEX) 500 MG Cap, Take 500 mg by mouth 3 times a day. Pt started a 14 day course of KEFLEX on 7/07, Disp: , Rfl:   •  diphenhydrAMINE (BENADRYL) 25 MG Tab, Take 50 mg by mouth at bedtime as needed for Sleep., Disp: , Rfl:   •  ziprasidone (GEODON) 40 MG Cap, Take 40 mg by mouth at bedtime., Disp: , Rfl:   •  valacyclovir (VALTREX) 1 GM Tab, Take 1,000 mg by mouth 4 times a day. Pt started a 7 day course of VALTREX on 7/07, Disp: , Rfl:   •  ondansetron (ZOFRAN ODT) 4 MG TABLET DISPERSIBLE, Take 1 Tablet by mouth every 6 hours as needed for Nausea., Disp: 10 Tablet, Rfl: 0  •  SUMAtriptan (IMITREX) 50 MG Tab, Take 1 Tablet by mouth 1 time a day as needed for Migraine., Disp: 10 Tablet, Rfl: 3  •  ibuprofen (MOTRIN) 600 MG Tab, Take 1 Tablet by mouth every 6 hours as needed., Disp: 30 Tablet, Rfl: 1  •  lactulose 20 GM/30ML Solution, Take 30 mL by mouth 2 times a day., Disp: 473 mL, Rfl: 0  •  docusate sodium (COLACE) 100 MG Cap, Take 1 Capsule by mouth 2 times a day., Disp: 60 Capsule, Rfl: 0  •  traZODone (DESYREL) 100 MG Tab, Take 100 mg by mouth every evening., Disp: , Rfl:   •  ivabradine (CORLANOR) 7.5 MG Tab tablet, Take 7.5 mg by mouth every evening., Disp: , Rfl:   •  Melatonin 10 MG Tab, Take 10 mg by mouth every bedtime., Disp: , Rfl:   •  albuterol 108 (90 Base)  "MCG/ACT Aero Soln inhalation aerosol, Inhale 2 Puffs every 6 hours as needed for Shortness of Breath., Disp: 8.5 g, Rfl: 6    ALLERGIES  Allergies   Allergen Reactions   • Depakote [Divalproex Sodium] Unspecified     Muscle spasms/muscle pain and weakness     • Doxycycline Anaphylaxis and Vomiting     Other reaction(s): pustules/blisters   • Montelukast [Singulair] Unspecified     Cardiac effusion   • Vancomycin Itching     Pt becomes flushed in face and chest.   RXN=7/10/16   • Amitriptyline Unspecified     Headaches     • Aripiprazole [Abilify] Unspecified     Headaches/muscle twitching     • Clindamycin Nausea          • Flomax [Tamsulosin Hydrochloride] Swelling   • Metformin Unspecified     Increased lactic acid     Other reaction(s): itching and rash/nausea vomiting   • Tamsulosin Swelling     Swelling of legs   • Tape Rash     Tears skin off  coban with Tegaderm tape ok intermittently  RXN=ongoing   • Wound Dressing Adhesive Hives     By pt report   • Ampicillin Rash     Pt reports that she received a rash    • Ciprofloxacin Rash         • Keflex Rash     Pt states she gets a rash with this medication  Tolerates ceftriaxone  Can take with Benadryl   • Levofloxacin Unspecified     Leg muscle cramps   • Metronidazole Rash     \"Vision problems\"   • Penicillins Hives     Can take with Benadryl   • Sulfa Drugs Itching and Myalgia     Muscle pain and weakness   • Valproic Acid Rash     .       PHYSICAL EXAM  VITAL SIGNS: /86   Pulse 95   Temp 37.3 °C (99.1 °F) (Oral)   Resp 16   Ht 1.6 m (5' 3\")   Wt 110 kg (242 lb 8.1 oz)   SpO2 97%   BMI 42.96 kg/m²     Constitutional: Patient is well developed, well nourished. Non-toxic appearing.  Mild distress.   HENT: Normocephalic, atraumatic. Slight right sided eye droop. Right facial droop.  Moist oral mucosa  Eyes: PERRL, EOMI,    Cardiovascular: Normal heart rate and Regular rhythm. No murmur  Thorax & Lungs: Clear and equal breath sounds with good " excursion. No respiratory distress, no rhonchi, wheezing or rales.  Abdomen: Bowel sounds normal in all four quadrants. Obese, Soft, nontender, no rebound, guarding, palpable masses.  Skin: Warm, Dry,  No rashes.   Extremities: Peripheral pulses 4/4 No edema, No tenderness  Neurologic: Alert & oriented x 3, Normal motor function, Normal sensory function, Slight right-sided eye droop, Right facial droop, Decreased  strength, biceps triceps, dorsi plantar flexion, Subjective sensory paresthesia.  Psychiatric: Affect normal, Judgment normal, Mood normal.     DIAGNOSTICS/PROCEDURES    LABS  Results for orders placed or performed during the hospital encounter of 07/26/22   CBC with Differential   Result Value Ref Range    WBC 7.9 4.8 - 10.8 K/uL    RBC 4.52 4.20 - 5.40 M/uL    Hemoglobin 13.9 12.0 - 16.0 g/dL    Hematocrit 40.4 37.0 - 47.0 %    MCV 89.4 81.4 - 97.8 fL    MCH 30.8 27.0 - 33.0 pg    MCHC 34.4 33.6 - 35.0 g/dL    RDW 43.0 35.9 - 50.0 fL    Platelet Count 212 164 - 446 K/uL    MPV 11.3 9.0 - 12.9 fL    Neutrophils-Polys 62.30 44.00 - 72.00 %    Lymphocytes 26.80 22.00 - 41.00 %    Monocytes 8.30 0.00 - 13.40 %    Eosinophils 1.60 0.00 - 6.90 %    Basophils 0.40 0.00 - 1.80 %    Immature Granulocytes 0.60 0.00 - 0.90 %    Nucleated RBC 0.00 /100 WBC    Neutrophils (Absolute) 4.93 2.00 - 7.15 K/uL    Lymphs (Absolute) 2.12 1.00 - 4.80 K/uL    Monos (Absolute) 0.66 0.00 - 0.85 K/uL    Eos (Absolute) 0.13 0.00 - 0.51 K/uL    Baso (Absolute) 0.03 0.00 - 0.12 K/uL    Immature Granulocytes (abs) 0.05 0.00 - 0.11 K/uL    NRBC (Absolute) 0.00 K/uL   Complete Metabolic Panel (CMP)   Result Value Ref Range    Sodium 138 135 - 145 mmol/L    Potassium 3.8 3.6 - 5.5 mmol/L    Chloride 112 96 - 112 mmol/L    Co2 12 (L) 20 - 33 mmol/L    Anion Gap 14.0 7.0 - 16.0    Glucose 135 (H) 65 - 99 mg/dL    Bun 15 8 - 22 mg/dL    Creatinine 0.80 0.50 - 1.40 mg/dL    Calcium 9.6 8.5 - 10.5 mg/dL    AST(SGOT) 13 12 - 45 U/L     ALT(SGPT) 22 2 - 50 U/L    Alkaline Phosphatase 82 30 - 99 U/L    Total Bilirubin 0.3 0.1 - 1.5 mg/dL    Albumin 4.6 3.2 - 4.9 g/dL    Total Protein 6.9 6.0 - 8.2 g/dL    Globulin 2.3 1.9 - 3.5 g/dL    A-G Ratio 2.0 g/dL   Troponin   Result Value Ref Range    Troponin T 7 6 - 19 ng/L   ESTIMATED GFR   Result Value Ref Range    GFR (CKD-EPI) 100 >60 mL/min/1.73 m 2     *Note: Due to a large number of results and/or encounters for the requested time period, some results have not been displayed. A complete set of results can be found in Results Review.     Labs reviewed by me    EKG  12 Lead EKG interpreted by me as shown above.    RADIOLOGY/PROCEDURES  CT-CTA HEAD WITH & W/O-POST PROCESS   Final Result         1.  No large vessel occlusion or aneurysm identified      CT-CTA NECK WITH & W/O-POST PROCESSING   Final Result         1.  CT angiogram of the neck within normal limits.         DX-CHEST-PORTABLE (1 VIEW)   Final Result         1.  No acute cardiopulmonary disease.        Results and radiologist interpretation reviewed by me.     COURSE & MEDICAL DECISION MAKING  Pertinent Labs & Imaging studies reviewed. (See chart for details)    8:45 PM - Patient seen and evaluated at bedside. Discussed plan of care, including labs and radiology. Patient agrees to plan of care. Ordered for CT-CTA Neck w/ & w/o, CT-CTA Head w/ & w/o, DX-Chest, CBC w/ Diff, CMP, Troponin, and EKG to evaluate. Patient will be treated with Compazine 10 mg injection, Benadryl 50 mg injection, and Toradol 15 mg injection for her symptoms. Differential diagnoses include, but are not limited to, Acute CVA (Ischemic vs. Hemorrhagic) vs. Complex Migraine  Patient's laboratories were all unremarkable.  She has a stable H&H, troponin is negative, BUN and creatinine are within normal limits.  CTA head and neck were also found to be negative and her chest x-ray shows no acute cardiopulmonary disease.  She was treated with medications for complex migraine  and she states that the headache is better but not gone.    11:39 PM - Patient was reevaluated at bedside and is still feeling fullness in her head and has pain in the right side. Patient will be reevaluated awaiting CT results.  CTs were negative, patient still complains of right-sided weakness.    1:10 AM - Paged Hospitalist    1:17 AM - I discussed the patient's case and the above findings with Dr. Baxtre (Hospitalist) who will assess the patient for hospitalization.       DISPOSITION:  Patient will be hospitalized by Dr. Baxter in guarded condition.    FINAL IMPRESSION  1. Other headache syndrome    2. Annetta-Danlos syndrome    3. Right sided weakness    4. Visual disturbance       Jeremiah RODRIGUEZ (Scribe), am scribing for, and in the presence of, Sonal Bryant D.O..    Electronically signed by: Jeremiah Jeff (Scribe), 7/26/2022    Sonal RODRIGUEZ D.O. personally performed the services described in this documentation, as scribed by Jeremiah Jeff in my presence, and it is both accurate and complete.    The note accurately reflects work and decisions made by me.  Sonal Bryant D.O.  7/27/2022  3:25 AM

## 2022-07-27 NOTE — ED NOTES
Pt arousable for med administration. Aox4, skin warm and dry, airway patent, rr even and unlabored, nad noted.

## 2022-07-27 NOTE — ED NOTES
Pt ambulatory to wheelchair with cane and brought back to room 28. Pt aox4, skin warm and dry, airway patent, rr even and unlabored. Pt has right sided facial drooping along with right eye. Pt also has RUE and RLE weakness, right sided facial drooping. Pt hooked up to monitor.

## 2022-07-28 ENCOUNTER — PATIENT OUTREACH (OUTPATIENT)
Dept: SCHEDULING | Facility: IMAGING CENTER | Age: 32
End: 2022-07-28
Payer: MEDICARE

## 2022-07-28 VITALS
HEART RATE: 72 BPM | WEIGHT: 250.88 LBS | HEIGHT: 63 IN | DIASTOLIC BLOOD PRESSURE: 54 MMHG | TEMPERATURE: 97.3 F | SYSTOLIC BLOOD PRESSURE: 114 MMHG | BODY MASS INDEX: 44.45 KG/M2 | OXYGEN SATURATION: 94 % | RESPIRATION RATE: 16 BRPM

## 2022-07-28 PROCEDURE — 700111 HCHG RX REV CODE 636 W/ 250 OVERRIDE (IP): Performed by: STUDENT IN AN ORGANIZED HEALTH CARE EDUCATION/TRAINING PROGRAM

## 2022-07-28 PROCEDURE — A9270 NON-COVERED ITEM OR SERVICE: HCPCS | Performed by: STUDENT IN AN ORGANIZED HEALTH CARE EDUCATION/TRAINING PROGRAM

## 2022-07-28 PROCEDURE — 700102 HCHG RX REV CODE 250 W/ 637 OVERRIDE(OP): Performed by: INTERNAL MEDICINE

## 2022-07-28 PROCEDURE — A9270 NON-COVERED ITEM OR SERVICE: HCPCS | Performed by: INTERNAL MEDICINE

## 2022-07-28 PROCEDURE — 96376 TX/PRO/DX INJ SAME DRUG ADON: CPT

## 2022-07-28 PROCEDURE — G0378 HOSPITAL OBSERVATION PER HR: HCPCS

## 2022-07-28 PROCEDURE — 99217 PR OBSERVATION CARE DISCHARGE: CPT | Mod: GC | Performed by: HOSPITALIST

## 2022-07-28 PROCEDURE — 700102 HCHG RX REV CODE 250 W/ 637 OVERRIDE(OP): Performed by: STUDENT IN AN ORGANIZED HEALTH CARE EDUCATION/TRAINING PROGRAM

## 2022-07-28 PROCEDURE — 96372 THER/PROPH/DIAG INJ SC/IM: CPT

## 2022-07-28 RX ORDER — ONDANSETRON 4 MG/1
4 TABLET, ORALLY DISINTEGRATING ORAL ONCE
Status: COMPLETED | OUTPATIENT
Start: 2022-07-28 | End: 2022-07-28

## 2022-07-28 RX ORDER — BUTALBITAL, ACETAMINOPHEN AND CAFFEINE 50; 325; 40 MG/1; MG/1; MG/1
1 TABLET ORAL ONCE
Status: COMPLETED | OUTPATIENT
Start: 2022-07-28 | End: 2022-07-28

## 2022-07-28 RX ORDER — BUTALBITAL, ACETAMINOPHEN AND CAFFEINE 50; 325; 40 MG/1; MG/1; MG/1
1 TABLET ORAL EVERY 4 HOURS PRN
Qty: 10 TABLET | Refills: 0 | Status: SHIPPED | OUTPATIENT
Start: 2022-07-28 | End: 2022-09-09

## 2022-07-28 RX ADMIN — SUMATRIPTAN SUCCINATE 25 MG: 25 TABLET ORAL at 05:12

## 2022-07-28 RX ADMIN — SUMATRIPTAN SUCCINATE 25 MG: 25 TABLET ORAL at 01:32

## 2022-07-28 RX ADMIN — BUTALBITAL, ACETAMINOPHEN, AND CAFFEINE 1 TABLET: 50; 325; 40 TABLET ORAL at 08:39

## 2022-07-28 RX ADMIN — METOCLOPRAMIDE 10 MG: 5 INJECTION, SOLUTION INTRAMUSCULAR; INTRAVENOUS at 00:34

## 2022-07-28 RX ADMIN — ONDANSETRON 4 MG: 4 TABLET, ORALLY DISINTEGRATING ORAL at 10:15

## 2022-07-28 RX ADMIN — KETOROLAC TROMETHAMINE 15 MG: 30 INJECTION, SOLUTION INTRAMUSCULAR; INTRAVENOUS at 05:11

## 2022-07-28 RX ADMIN — HEPARIN SODIUM 5000 UNITS: 5000 INJECTION, SOLUTION INTRAVENOUS; SUBCUTANEOUS at 05:11

## 2022-07-28 RX ADMIN — IVABRADINE 7.5 MG: 7.5 TABLET, FILM COATED ORAL at 05:41

## 2022-07-28 RX ADMIN — KETOROLAC TROMETHAMINE 15 MG: 30 INJECTION, SOLUTION INTRAMUSCULAR; INTRAVENOUS at 00:34

## 2022-07-28 ASSESSMENT — PAIN DESCRIPTION - PAIN TYPE: TYPE: ACUTE PAIN

## 2022-07-28 NOTE — DISCHARGE PLANNING
DALY rm re-introduced community care management to the patient.     Pt states that she is living with her grandma and Aunt.   · Per patient, the house is on a reverse mortgage and when her grandma passes away they will need to move.   · Pt is interested in low income housing through [employer]. CHW and patient checked the web site and the wait list application is currently closed.   · Pt is interested in obtaining a job. Pt currently receives SS/D monthly of $862.     Pt is self driving.   · CHW provided information to [facility] and explained that she can also utilize [facility].     Pt receives $162 per month from SNAP benefits.   · Reports no barriers to food at this time.     Pt would like a sooner appointment with her PCP Torres Brody M.D.  · Pt has been scheduled for a hospital follow up on 8/1/2022 @ 10am with Dr. Brody.   · Pt also is scheduled on 8/30/2022 @ 10:30am with Dr. Brody.     Pt states that she has had trouble affording imitrex medication.   · Per patient, her healthy insurance only covers 9 tablets every 30 days.   · Pt has been advised to take 1 tablet once per day.   · Grandma has had to pay $12 out of pocket per month for the medication.   · Pt utilizes the [facility] on [address].     Plan: CHW will follow up post discharge.

## 2022-07-28 NOTE — ED NOTES
Very difficult IV placement, 3 attempts via US and all blew.  IV placed in breast and now has infiltrated after 500 NS.  Pt does not want anymore attempts at IV placement.    none

## 2022-07-28 NOTE — CARE PLAN
The patient is Stable - Low risk of patient condition declining or worsening    Shift Goals  Clinical Goals: mobilize safely  Patient Goals: pain control migraine  Family Goals: n/a    Progress made toward(s) clinical / shift goals:  Patient uses call light for needs, ambulates with cane, pain controlled by MAR, participates in cares.       Problem: Knowledge Deficit - Standard  Goal: Patient and family/care givers will demonstrate understanding of plan of care, disease process/condition, diagnostic tests and medications  Outcome: Progressing     Problem: Pain - Standard  Goal: Alleviation of pain or a reduction in pain to the patient’s comfort goal  Outcome: Progressing     Problem: Fall Risk  Goal: Patient will remain free from falls  Outcome: Progressing       Patient is not progressing towards the following goals:

## 2022-07-28 NOTE — DISCHARGE SUMMARY
"Discharge Summary    Date of Admission: 7/27/2022  Date of Discharge: 7/28/2022  Discharging Attending: Robert Hooker M.D.   Discharging Senior Resident: Dr. MOIRA Day MD      CHIEF COMPLAINT ON ADMISSION  Chief Complaint   Patient presents with   • Vision Change     Since 0730, patient c/o \"Visual tracing. I keep seeing photocopies of things even when I move it.\"    • Headache     Since 0830, progressively increasing \"fullness in my head. It doesn't hurt, it just feels full.\"   • Pain     Since 1230, \"My hands and face feel like they are on fire.\"   • Unilateral Weakness     Right arm and leg feel weak per patient since 1700.       Reason for Admission  Complex migraine with right-sided hemiplegia    Admission Date  7/26/2022    CODE STATUS  Full Code    HPI & HOSPITAL COURSE  Kristin Balderrama is 31 y.o. female well-known to our service with PMHx remarkable for complex migraines with hemiplegia, schizoaffective disorder, borderline personality disorder, diabetes mellitus type 2 and obesity who presented to [facility] ED with migraine headache and feeling of head fullness complicated by visual aura.  4 hours after onset of aura, unilateral hemiplegia of right extremities ensued.  CT head was remarkable for partially empty sella turcica without any evidence of other pathology.  CT angio head and neck was unremarkable.  Of note, MRI from June with similar presentation was unremarkable with the exception of redemonstration of partially empty sella turcica.  Once headache was aborted with Fioricet (after setting the expectation that this is a \"magic drug\" and will surely resolve symptoms) patient reported hemiplegia has resolved.       * Migraine, hemiplegic  Hemiplegia has resolved with Fioricet after setting the expectation that this will surely abort her headache.  Of note, on physical exam despite reports of hemiplegia, patient was able to push downwards on my hand with force claiming she cannot keep her hand up " "against gravity.  I suspect that upper portion of this hemiplegia may be due to factitious disorder and not due to malingering.  Patient appears to be genuinely disturbed by this \"hemiplegia\".  -Continue sumatriptan 50 mg once daily as needed for migraines  -Frequent follow-up with PCP  -Fioricet as a last resort abortive medication prior to coming to hospital was prescribed, a total of 10 pills.  Patient was educated to take only if Imitrex fails to abort headache.  -10 minutes education regarding migraine headaches took place at the bedside, including suggestion for diary to recognize triggers as well as importance of early  of headache with onset of aura.      IBS (irritable bowel syndrome)  Patient uses [employer] neurostimulator model 1101 with tined lead kit model 1201, serial numbers [id number] and [id number] (respectively), both of which are compatible with MRI as long as the unit is turned off.  -Continue lactulose  -High-fiber diet    Schizoaffective disorder, depressive type (HCC)- (present on admission)  Continue trazodone and geodon    Morbidly obese (HCC)- (present on admission)  Will need counseling when clinically appropriate     Physical exam  /54   Pulse 72   Temp 36.3 °C (97.3 °F) (Temporal)   Resp 16   Ht 1.6 m (5' 3\")   Wt 114 kg (250 lb 14.1 oz)   SpO2 94%   BMI 44.44 kg/m²      General: Obese young woman sitting in the bedside chair in no acute distress  HEENT: NC/AT.  PERRLA, EOMI.  External ear normal.  Nares patent, nonedematous nonerythematous.  Moist mucous membranes. Good dentition.  Trachea midline.   Neck: No JVP.  Carotid bruit could not be appreciated.  Nontender, nonnodular thyroid.  No anterior or posterior cervical, occipital, supraclavicular lymphadenopathy  Cardiovascular: PMI<2 cm at fifth costal space at midclavicular line.  No heaves appreciated.  No thrills.  RRR W/O M, R, G.  Pulmonary: Normal work of breathing.  Resonant to percussion.  CTAB, no wheezes, " crackles, rhonchi heard in 10 lung fields  Abdomen: Flat, soft, nondistended.  Normoactive bowel sounds.  Nontender to percussion or palpation.  No hepatosplenomegaly noted.  No fluid wave  Musculoskeletal: Normal tone and bulk.  Full ROM.  Skin: Warm and dry.  No rashes, bruises or lacerations noted  Neuro: Alert and oriented X4.  CN II-XII checked and intact.  5 out of 5 strength throughout.  DTR 2+ throughout.  FTN, HTS intact.  Sensation intact . negative Romberg.  While patient reports weakness and cannot keep her right arm up at chest level, I positioned her hand at chest level and could both feel NC patient actively pushing against my hand attempting to drop her arm.  This has remarkably improved upon repeat exam 10 minutes after Fioricet was administered.  Lymphatic: No edema or lymphadenopathy noted.  Psychiatric: Good mood congruent affect.  Thought form linear, content appropriate    Therefore, she is discharged in fair and stable condition to home with close outpatient follow-up.    The patient recovered much more quickly than anticipated on admission.    Discharge Date  7/28/2022    FOLLOW UP ITEMS POST DISCHARGE  Migraine headache with PCP    DISCHARGE DIAGNOSES  Principal Problem:    Migraine, hemiplegic POA: Unknown  Active Problems:    Morbidly obese (HCC) POA: Yes      Overview: IMO load March 2020    Schizoaffective disorder, depressive type (HCC) POA: Yes    IBS (irritable bowel syndrome) POA: Unknown    Hemiplegic 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, not intractable POA: Yes  Resolved Problems:    * No resolved hospital problems. *      FOLLOW UP  Future Appointments   Date Time Provider Department Center   9/12/2022  1:20 PM Jimmy Zayas M.D. [facility] None   10/21/2022 11:00 AM Des Celis M.D. [facility] None   11/10/2022 12:40 PM John Leon M.D. [facility] None     No follow-up provider specified.    MEDICATIONS ON DISCHARGE     Medication List      CONTINUE taking these medications       Instructions   albuterol 108 (90 Base) MCG/ACT Aers inhalation aerosol   Inhale 2 Puffs every 6 hours as needed for Shortness of Breath.  Dose: 2 Puff     Corlanor 7.5 MG Tabs tablet  Generic drug: ivabradine   Take 7.5 mg by mouth every evening.  Dose: 7.5 mg     docusate sodium 100 MG Caps  Commonly known as: COLACE   Take 1 Capsule by mouth 2 times a day.  Dose: 100 mg     ipratropium-albuterol 0.5-2.5 (3) MG/3ML nebulizer solution  Commonly known as: DUONEB   Take 3 mL by nebulization every four hours as needed for Shortness of Breath for up to 30 days. Nebulizer  Dose: 3 mL     lactulose 20 GM/30ML Soln   Take 30 mL by mouth 2 times a day.  Dose: 30 mL     Melatonin 10 MG Tabs   Take 10 mg by mouth every bedtime.  Dose: 10 mg     ondansetron 4 MG Tbdp  Commonly known as: ZOFRAN ODT   Take 1 Tablet by mouth every 6 hours as needed for Nausea.  Dose: 4 mg     SUMAtriptan 50 MG Tabs  Commonly known as: IMITREX   Take 1 Tablet by mouth 1 time a day as needed for Migraine.  Dose: 50 mg     traZODone 100 MG Tabs  Commonly known as: DESYREL   Take 100 mg by mouth every evening.  Dose: 100 mg     ziprasidone 40 MG Caps  Commonly known as: GEODON   Take 40 mg by mouth at bedtime.  Dose: 40 mg        STOP taking these medications    cephALEXin 500 MG Caps  Commonly known as: KEFLEX     diphenhydrAMINE 25 MG Tabs  Commonly known as: BENADRYL     ibuprofen 600 MG Tabs  Commonly known as: MOTRIN     valacyclovir 1 GM Tabs  Commonly known as: VALTREX            Allergies  Allergies   Allergen Reactions   • Depakote [Divalproex Sodium] Unspecified     Muscle spasms/muscle pain and weakness     • Doxycycline Anaphylaxis and Vomiting     Other reaction(s): pustules/blisters   • Montelukast [Singulair] Unspecified     Cardiac effusion   • Vancomycin Itching     Pt becomes flushed in face and chest.   RXN=7/10/16   • Amitriptyline Unspecified     Headaches     • Aripiprazole [Abilify] Unspecified     Headaches/muscle twitching    "  • Clindamycin Nausea          • Flomax [Tamsulosin Hydrochloride] Swelling   • Metformin Unspecified     Increased lactic acid     Other reaction(s): itching and rash/nausea vomiting   • Tamsulosin Swelling     Swelling of legs   • Tape Rash     Tears skin off  coban with Tegaderm tape ok intermittently  RXN=ongoing   • Wound Dressing Adhesive Hives     By pt report   • Ampicillin Rash     Pt reports that she received a rash    • Ciprofloxacin Rash         • Keflex Rash     Pt states she gets a rash with this medication  Tolerates ceftriaxone  Can take with Benadryl   • Levofloxacin Unspecified     Leg muscle cramps   • Metronidazole Rash     \"Vision problems\"   • Penicillins Hives     Can take with Benadryl   • Sulfa Drugs Itching and Myalgia     Muscle pain and weakness   • Valproic Acid Rash     .       DIET  Orders Placed This Encounter   Procedures   • Diet Order Diet: Regular     Standing Status:   Standing     Number of Occurrences:   1     Order Specific Question:   Diet:     Answer:   Regular [1]       ACTIVITY  As tolerated.  Weight bearing as tolerated    CONSULTATIONS  None    PROCEDURES  None    "

## 2022-08-01 NOTE — PROGRESS NOTES
CHW Vinson called the patient in an attempt to follow up after DC.     Pt did not answer. TONEYW left a detailed message requesting a call back.     Plan: CHW will attempt again.

## 2022-08-04 NOTE — PROGRESS NOTES
DALY Deras followed up with the patient.     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 Unfortunately, she missed work yesterday as her aunt tested positive for covid and her grandma is showing symptoms of Covid. Pt states that she is aware of dispatch health and will call as needed.   Pt was getting a call from her employer and had to go. CHW will check back in later.

## 2022-08-11 ENCOUNTER — TELEPHONE (OUTPATIENT)
Dept: CARDIOLOGY | Facility: MEDICAL CENTER | Age: 32
End: 2022-08-11
Payer: MEDICARE

## 2022-08-11 ENCOUNTER — APPOINTMENT (OUTPATIENT)
Dept: RADIOLOGY | Facility: MEDICAL CENTER | Age: 32
End: 2022-08-11
Attending: STUDENT IN AN ORGANIZED HEALTH CARE EDUCATION/TRAINING PROGRAM
Payer: MEDICARE

## 2022-08-11 ENCOUNTER — HOSPITAL ENCOUNTER (EMERGENCY)
Facility: MEDICAL CENTER | Age: 32
End: 2022-08-11
Attending: STUDENT IN AN ORGANIZED HEALTH CARE EDUCATION/TRAINING PROGRAM
Payer: MEDICARE

## 2022-08-11 VITALS
BODY MASS INDEX: 43.41 KG/M2 | DIASTOLIC BLOOD PRESSURE: 92 MMHG | HEIGHT: 63 IN | SYSTOLIC BLOOD PRESSURE: 159 MMHG | HEART RATE: 82 BPM | OXYGEN SATURATION: 96 % | WEIGHT: 245 LBS | TEMPERATURE: 97.5 F | RESPIRATION RATE: 22 BRPM

## 2022-08-11 DIAGNOSIS — I49.3 PVC (PREMATURE VENTRICULAR CONTRACTION): ICD-10-CM

## 2022-08-11 DIAGNOSIS — R07.9 CHEST PAIN, UNSPECIFIED TYPE: ICD-10-CM

## 2022-08-11 DIAGNOSIS — I48.0 PAF (PAROXYSMAL ATRIAL FIBRILLATION) (HCC): ICD-10-CM

## 2022-08-11 DIAGNOSIS — R00.2 PALPITATIONS: ICD-10-CM

## 2022-08-11 DIAGNOSIS — I47.11 INAPPROPRIATE SINUS TACHYCARDIA (HCC): ICD-10-CM

## 2022-08-11 LAB
ALBUMIN SERPL BCP-MCNC: 4.9 G/DL (ref 3.2–4.9)
ALBUMIN/GLOB SERPL: 2.1 G/DL
ALP SERPL-CCNC: 92 U/L (ref 30–99)
ALT SERPL-CCNC: 16 U/L (ref 2–50)
ANION GAP SERPL CALC-SCNC: 15 MMOL/L (ref 7–16)
AST SERPL-CCNC: 13 U/L (ref 12–45)
BASOPHILS # BLD AUTO: 0.4 % (ref 0–1.8)
BASOPHILS # BLD: 0.03 K/UL (ref 0–0.12)
BILIRUB SERPL-MCNC: 0.5 MG/DL (ref 0.1–1.5)
BUN SERPL-MCNC: 13 MG/DL (ref 8–22)
CALCIUM SERPL-MCNC: 9.4 MG/DL (ref 8.5–10.5)
CHLORIDE SERPL-SCNC: 111 MMOL/L (ref 96–112)
CO2 SERPL-SCNC: 13 MMOL/L (ref 20–33)
CREAT SERPL-MCNC: 0.85 MG/DL (ref 0.5–1.4)
EKG IMPRESSION: NORMAL
EOSINOPHIL # BLD AUTO: 0.13 K/UL (ref 0–0.51)
EOSINOPHIL NFR BLD: 1.9 % (ref 0–6.9)
ERYTHROCYTE [DISTWIDTH] IN BLOOD BY AUTOMATED COUNT: 43.2 FL (ref 35.9–50)
GFR SERPLBLD CREATININE-BSD FMLA CKD-EPI: 93 ML/MIN/1.73 M 2
GLOBULIN SER CALC-MCNC: 2.3 G/DL (ref 1.9–3.5)
GLUCOSE SERPL-MCNC: 116 MG/DL (ref 65–99)
HCT VFR BLD AUTO: 41.3 % (ref 37–47)
HGB BLD-MCNC: 14.4 G/DL (ref 12–16)
IMM GRANULOCYTES # BLD AUTO: 0.03 K/UL (ref 0–0.11)
IMM GRANULOCYTES NFR BLD AUTO: 0.4 % (ref 0–0.9)
LYMPHOCYTES # BLD AUTO: 1.85 K/UL (ref 1–4.8)
LYMPHOCYTES NFR BLD: 27.4 % (ref 22–41)
MCH RBC QN AUTO: 31.2 PG (ref 27–33)
MCHC RBC AUTO-ENTMCNC: 34.9 G/DL (ref 33.6–35)
MCV RBC AUTO: 89.4 FL (ref 81.4–97.8)
MONOCYTES # BLD AUTO: 0.48 K/UL (ref 0–0.85)
MONOCYTES NFR BLD AUTO: 7.1 % (ref 0–13.4)
NEUTROPHILS # BLD AUTO: 4.23 K/UL (ref 2–7.15)
NEUTROPHILS NFR BLD: 62.8 % (ref 44–72)
NRBC # BLD AUTO: 0 K/UL
NRBC BLD-RTO: 0 /100 WBC
PLATELET # BLD AUTO: 231 K/UL (ref 164–446)
PMV BLD AUTO: 11 FL (ref 9–12.9)
POTASSIUM SERPL-SCNC: 3.7 MMOL/L (ref 3.6–5.5)
PROT SERPL-MCNC: 7.2 G/DL (ref 6–8.2)
RBC # BLD AUTO: 4.62 M/UL (ref 4.2–5.4)
SODIUM SERPL-SCNC: 139 MMOL/L (ref 135–145)
TROPONIN T SERPL-MCNC: <6 NG/L (ref 6–19)
WBC # BLD AUTO: 6.8 K/UL (ref 4.8–10.8)

## 2022-08-11 PROCEDURE — 80053 COMPREHEN METABOLIC PANEL: CPT

## 2022-08-11 PROCEDURE — 36415 COLL VENOUS BLD VENIPUNCTURE: CPT

## 2022-08-11 PROCEDURE — 93005 ELECTROCARDIOGRAM TRACING: CPT

## 2022-08-11 PROCEDURE — 84484 ASSAY OF TROPONIN QUANT: CPT

## 2022-08-11 PROCEDURE — 85025 COMPLETE CBC W/AUTO DIFF WBC: CPT

## 2022-08-11 PROCEDURE — 99283 EMERGENCY DEPT VISIT LOW MDM: CPT

## 2022-08-11 PROCEDURE — 93005 ELECTROCARDIOGRAM TRACING: CPT | Performed by: STUDENT IN AN ORGANIZED HEALTH CARE EDUCATION/TRAINING PROGRAM

## 2022-08-11 PROCEDURE — 71045 X-RAY EXAM CHEST 1 VIEW: CPT

## 2022-08-11 ASSESSMENT — HEART SCORE
TROPONIN: LESS THAN OR EQUAL TO NORMAL LIMIT
RISK FACTORS: >2 RISK FACTORS OR HX OF ATHEROSCLEROTIC DISEASE
ECG: NORMAL
HISTORY: SLIGHTLY SUSPICIOUS
HEART SCORE: 2
AGE: <45

## 2022-08-11 ASSESSMENT — FIBROSIS 4 INDEX: FIB4 SCORE: 0.42

## 2022-08-12 ENCOUNTER — NON-PROVIDER VISIT (OUTPATIENT)
Dept: CARDIOLOGY | Facility: MEDICAL CENTER | Age: 32
End: 2022-08-12
Payer: MEDICARE

## 2022-08-12 DIAGNOSIS — I48.0 PAF (PAROXYSMAL ATRIAL FIBRILLATION) (HCC): ICD-10-CM

## 2022-08-12 DIAGNOSIS — I49.3 PVCS (PREMATURE VENTRICULAR CONTRACTIONS): ICD-10-CM

## 2022-08-12 DIAGNOSIS — I49.1 PREMATURE ATRIAL CONTRACTIONS: ICD-10-CM

## 2022-08-12 DIAGNOSIS — I47.11 INAPPROPRIATE SINUS TACHYCARDIA (HCC): ICD-10-CM

## 2022-08-12 DIAGNOSIS — I49.3 PVC (PREMATURE VENTRICULAR CONTRACTION): ICD-10-CM

## 2022-08-12 DIAGNOSIS — R00.2 PALPITATIONS: ICD-10-CM

## 2022-08-12 NOTE — DISCHARGE INSTRUCTIONS
If you develop any severe shortness of breath, get dizzy passout severe chest pain or recurrent palpitations return for recheck follow-up with your cardiologist return with any other new or concerning symptoms take your medications daily as prescribed

## 2022-08-12 NOTE — TELEPHONE ENCOUNTER
SW    Caller: Kristin    Reported Symptoms: Pt called and stated her heart rate keeps raising and lowering every 2 minutes.    Heart Rate Readin - 59 - 89 - 114 - 89    Callback Number: [phone]

## 2022-08-12 NOTE — TELEPHONE ENCOUNTER
"KRYSTIAN    Caller: Kristin    Topic/issue: Pt called and wanted to give a update to her going into the ER last night. She stated, \"Went to ER last night and possible said I had Afib and maybe Bradycardia.\" She would like to speak to a nurse about this update.    Callback Number: [phone]    "

## 2022-08-12 NOTE — LETTER
September 15, 2022        Kristin Balderrama  [address] [place] [address]          Dear Kristin,    We have received the results of your recent:    Heart Monitor    Your test came back within normal limits.  Please follow up as previously discussed with your physician.      Feel free to call us with any questions.        Sincerely,          Keira, Medical Assistant for   Electronically Signed

## 2022-08-12 NOTE — PROGRESS NOTES
Patient enrolled in the 14 day ePatch Holter monitoring program per John Leon MD.  >Office hook-up, serial # [id number].  Patient has extreme adhesive sensitivity, so this was fitted with a flex adapter, a lead wire adapter and hypoallergenic electrodes.  >EOS scanned on 9/2/22 and submitted for physician review to ..

## 2022-08-12 NOTE — TELEPHONE ENCOUNTER
S/W pt, who reports sob and chest pain that started about an hour ago, states pain going down one arm and watch checking pulse rates, erratic readings (see prev msg), accuracy could be off, but with given symptoms advised calling 911. Pt refusing at this time, states she just started a new job and is at work until 8:30, does not want to lose her jog. We discussed importance to r/o urgent cardiac event, pt will consider when off work, pt had to cut call short to go back to her duties.

## 2022-08-12 NOTE — ED TRIAGE NOTES
Chief Complaint   Patient presents with    Chest Pain     X1 hr, pt reports irregular HR while at rest with chest pain. Recently diagnosed with viral URI 1 week ago.     Pt ambulatory to triage with above complaint. Pt is followed by a cardiologist and has history of SVT, Angina, and tachycardia. Pt in NAD at this time.

## 2022-08-12 NOTE — TELEPHONE ENCOUNTER
S/W pt, she did go to ER yesterday after we discussed her symptoms--see note. EKG showed normal sinus before she was discharged. Pt denies symptoms today, says pulse in upper 50's and 60's, unusual for her, checking pulse with apple watch. Pt scheduled with KRYSTIAN in November, offered sooner appt, pt will call back to schedule. She will stay well hydrated, continue to monitor BP and Pulse and monitor for symptoms. Back to ER with urgent symptoms.  Spoke to KRYSTIAN, placing order for 14 day cardiac event monitor. Pt states she had blisters--reaction to adhesive before, will include this in order and notation sent to HM team.

## 2022-08-12 NOTE — ED PROVIDER NOTES
CHIEF COMPLAINT  Chief Complaint   Patient presents with    Chest Pain     X1 hr, pt reports irregular HR while at rest with chest pain. Recently diagnosed with viral URI 1 week ago.       HPI  Kristin Balderrama is a 31 y.o. female who presents for evaluation of palpitations.  Patient has a history of SVT, reported atrial fibrillation as well as sinus tachycardia.  This evening while she was at work she stated that she was exerting herself and her apple watch began to read that her heart rate was high and then low ranging from the  range ..  Had some associated chest pain with the palpitations so she came to the Emergency Department for evaluation.  No shortness of breath hemoptysis history of DVT PE no ripping tearing back pain numbness tingling weakness in extremities.  No known prior history of cardiac disease. patient does not follow with cardiology as an outpatient    REVIEW OF SYSTEMS  See HPI for further details. All other systems are negative.     PAST MEDICAL HISTORY   has a past medical history of Abdominal pain, Anginal syndrome, Apnea, sleep, Arrhythmia, Arthritis, ASTHMA, Back pain, Borderline personality disorder (formerly [facility]), Breath shortness, Bronchitis (02/08/2022), Cardiac arrhythmia, Chickenpox, Chronic UTI (09/18/2010), Cough, Daytime sleepiness, Dental disorder (03/08/2021), Depression, Diabetes (formerly [facility]), Diarrhea, Disorder of thyroid, Fall, Fatigue, Frequent headaches, Gasping for breath, Gynecological disorder, Headache(784.0), Heart burn, Heart murmur, History of falling, Indigestion, Migraine, Mitochondrial disease (formerly [facility]), 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    SOCIAL HISTORY  Social History     Tobacco Use    Smoking status: Never    Smokeless tobacco: Never   Vaping Use    Vaping Use: Never used   Substance and Sexual Activity    Alcohol use: No     Alcohol/week: 0.0 oz    Drug use: Not Currently     Frequency: 7.0 times per week     Types: Marijuana    Sexual activity: Not Currently     Birth control/protection: Implant       SURGICAL HISTORY   has a past surgical history that includes neuro dest facet l/s w/ig sngl (4/21/2015); recovery (1/27/2016); delmar by laparoscopy (8/29/2010); lumbar fusion anterior (8/21/2012); other cardiac surgery (1/2016); tonsillectomy; bowel stimulator insertion (7/13/2016); gastroscopy with balloon dilatation (N/A, 1/4/2017); muscle biopsy (Right, 1/26/2017); other abdominal surgery; laminotomy; bowel stimulator insertion (3/10/2021); lap,diagnostic abdomen (2/14/2022); and ovarian cystectomy (Right, 2/14/2022).    CURRENT MEDICATIONS  Home Medications       Reviewed by Jose Machado R.N. (Registered Nurse) on 08/11/22 at 1812  Med List Status: Not Addressed     Medication Last Dose Status   albuterol 108 (90 Base) MCG/ACT Aero Soln inhalation aerosol  Active   butalbital/apap/caffeine (FIORICET) -40 mg Tab  Active   docusate sodium (COLACE) 100 MG Cap  Active   ivabradine (CORLANOR) 7.5 MG Tab tablet  Active   lactulose 20 GM/30ML Solution  Active   Melatonin 10 MG Tab  Active   ondansetron (ZOFRAN ODT) 4 MG TABLET DISPERSIBLE  Active   SUMAtriptan (IMITREX) 50 MG Tab  Active   traZODone (DESYREL) 100 MG Tab  Active   ziprasidone (GEODON) 40 MG Cap  Active                    ALLERGIES  Allergies   Allergen Reactions    Depakote [Divalproex Sodium] Unspecified     Muscle spasms/muscle pain and weakness      Doxycycline Anaphylaxis and Vomiting     Other reaction(s): pustules/blisters    Montelukast [Singulair] Unspecified     Cardiac effusion    Vancomycin Itching     Pt  "becomes flushed in face and chest.   RXN=7/10/16    Amitriptyline Unspecified     Headaches      Aripiprazole [Abilify] Unspecified     Headaches/muscle twitching      Clindamycin Nausea           Flomax [Tamsulosin Hydrochloride] Swelling    Metformin Unspecified     Increased lactic acid     Other reaction(s): itching and rash/nausea vomiting    Tamsulosin Swelling     Swelling of legs    Tape Rash     Tears skin off  coban with Tegaderm tape ok intermittently  RXN=ongoing    Wound Dressing Adhesive Hives     By pt report    Ampicillin Rash     Pt reports that she received a rash     Ciprofloxacin Rash          Keflex Rash     Pt states she gets a rash with this medication  Tolerates ceftriaxone  Can take with Benadryl    Levofloxacin Unspecified     Leg muscle cramps    Metronidazole Rash     \"Vision problems\"    Penicillins Hives     Can take with Benadryl    Sulfa Drugs Itching and Myalgia     Muscle pain and weakness    Valproic Acid Rash     .       FAMILY HISTORY  No pertinent family history    PHYSICAL EXAM   BP (!) 159/92   Pulse 82   Temp 36.4 °C (97.5 °F) (Temporal)   Resp (!) 22   Ht 1.6 m (5' 3\")   Wt 111 kg (245 lb)   SpO2 96%   BMI 43.40 kg/m²  @JODI[[id number]::@   Pulse ox interpretation I interpret this pulse ox as normal.  VITALS - vital signs documented prior to this note have been reviewed and noted,  GENERAL - awake, alert, oriented, GCS 15, no apparent distress, non-toxic  appearing  HEENT - normocephalic, atraumatic, pupils equal, sclera anicteric, mucus  membranes moist  NECK - supple, no meningismus, full active range of motion, trachea midline  CARDIOVASCULAR - regular rate/rhythm, no murmurs/gallops/rubs  PULMONARY - no respiratory distress, speaking in full sentences, clear to  auscultation bilaterally, no wheezing/ronchi/rales, no accessory muscle use  GASTROINTESTINAL - soft, non-tender, non-distended, no rebound, guarding,  or peritonitis  GENITOURINARY - Deferred  NEUROLOGIC - " Awake alert, normal mental status, speech fluid, cognition  normal, moves all extremities  MUSCULOSKELETAL - no obvious asymmetry or deformities present  EXTREMITIES - warm, well-perfused, no cyanosis or significant edema  DERMATOLOGIC - warm, dry, no rashes, no jaundice  PSYCHIATRIC - normal affect, normal insight, normal concentration            EKg  EKG shows a sinus tachycardic rhythm with a normal TX QRS QTc interval there is a normal axis.  There is no ST elevation or depression to suggest ischemia no abnormal T wave inversion.  There is no STEMI pattern.  Interpretation is normal sinus rhythm as interpreted by myself  Rate is 107 unchanged when compared to prior    LABS      Labs Reviewed   COMP METABOLIC PANEL - Abnormal; Notable for the following components:       Result Value    Co2 13 (*)     Glucose 116 (*)     All other components within normal limits   CBC WITH DIFFERENTIAL   TROPONIN   ESTIMATED GFR         Pertinent Labs & Imaging studies reviewed. (See chart for details)    RADIOLOGY  DX-CHEST-PORTABLE (1 VIEW)   Final Result         1. No acute cardiopulmonary abnormalities are identified.              ED COURSE/PROCEDURES          Medications - No data to display            MEDICAL DECISION MAKING    Patient presented for evaluation palpitations with chest pain pneumonia pleural effusion pneumothorax.  Differential initial includes not limited to arrhythmia electrolyte abnormality atypical ACS myocarditis pericarditis less likely aortic dissection, pulmonary embolism among other considerations.  Labs were obtained were nonactionable initial troponin is negative, EKG is nonischemic no evidence of ischemia, or arrhythmia.  She has been observed in the emergency department, with resolution of her palpitations and is currently in a sinus rhythm on the monitor rate of 83.  Heart score is 2 for risk factors Overall based on the history physical exam and totality of information present, I estimate there  is low risk for acute coronary syndrome.  She has no hemoptysis hypoxia dyspnea and her tachycardia has resolved, low clinical suspicion for pulmonary embolism bicarb is low though review of records reveals this is chronic and no acute changes. Given that her palpitations have resolved lab work is reassuring she does have cardiology outpatient follow-up I consider discharge disposition reasonable.  I have discussed the labs risks and diagnosis, the patient agrees with discharge home with close follow-up.  We also discussed return emergency department immediately if any new or worsening symptoms occur.  We discussed symptoms which was concerning that necessitate immediate return.        FINAL IMPRESSION  1.  Palpitations  2.  Chest pain  3.  Elevated blood pressure         Electronically signed by: Rogelio Mcrae D.O., 8/11/2022 9:02 PM      Dictation Disclaimer  Please note this report has been produced using speech recognition software and  may contain errors related to that system, including errors seen in grammar,  punctuation and spelling, as well as words and phrases that may be inappropriate.  If there are any questions or concerns, please feel free to contact the dictating  physician for clarification.

## 2022-08-12 NOTE — ED NOTES
Discharge education provided. Discharge paperwork signed by pt. Prescription to be picked up by pt. All questions answered. All belongings with pt. Pt ambulated to lobby unassisted with steady gait.

## 2022-08-15 ENCOUNTER — APPOINTMENT (OUTPATIENT)
Dept: RADIOLOGY | Facility: MEDICAL CENTER | Age: 32
End: 2022-08-15
Attending: EMERGENCY MEDICINE
Payer: MEDICARE

## 2022-08-15 ENCOUNTER — HOSPITAL ENCOUNTER (EMERGENCY)
Facility: MEDICAL CENTER | Age: 32
End: 2022-08-15
Attending: EMERGENCY MEDICINE
Payer: MEDICARE

## 2022-08-15 ENCOUNTER — PATIENT OUTREACH (OUTPATIENT)
Dept: HEALTH INFORMATION MANAGEMENT | Facility: OTHER | Age: 32
End: 2022-08-15
Payer: MEDICARE

## 2022-08-15 VITALS
HEART RATE: 75 BPM | TEMPERATURE: 97.9 F | DIASTOLIC BLOOD PRESSURE: 69 MMHG | HEIGHT: 63 IN | BODY MASS INDEX: 43.36 KG/M2 | WEIGHT: 244.71 LBS | SYSTOLIC BLOOD PRESSURE: 128 MMHG | RESPIRATION RATE: 17 BRPM | OXYGEN SATURATION: 98 %

## 2022-08-15 DIAGNOSIS — N12 PYELONEPHRITIS: ICD-10-CM

## 2022-08-15 LAB
ALBUMIN SERPL BCP-MCNC: 4.8 G/DL (ref 3.2–4.9)
ALBUMIN/GLOB SERPL: 2.3 G/DL
ALP SERPL-CCNC: 81 U/L (ref 30–99)
ALT SERPL-CCNC: 15 U/L (ref 2–50)
ANION GAP SERPL CALC-SCNC: 11 MMOL/L (ref 7–16)
ANION GAP SERPL CALC-SCNC: 13 MMOL/L (ref 7–16)
APPEARANCE UR: CLEAR
AST SERPL-CCNC: 12 U/L (ref 12–45)
BACTERIA #/AREA URNS HPF: NEGATIVE /HPF
BASOPHILS # BLD AUTO: 0.3 % (ref 0–1.8)
BASOPHILS # BLD: 0.02 K/UL (ref 0–0.12)
BILIRUB SERPL-MCNC: 0.4 MG/DL (ref 0.1–1.5)
BILIRUB UR QL STRIP.AUTO: NEGATIVE
BUN SERPL-MCNC: 11 MG/DL (ref 8–22)
BUN SERPL-MCNC: 12 MG/DL (ref 8–22)
CALCIUM SERPL-MCNC: 8.2 MG/DL (ref 8.5–10.5)
CALCIUM SERPL-MCNC: 9.2 MG/DL (ref 8.5–10.5)
CHLORIDE SERPL-SCNC: 113 MMOL/L (ref 96–112)
CHLORIDE SERPL-SCNC: 114 MMOL/L (ref 96–112)
CO2 SERPL-SCNC: 14 MMOL/L (ref 20–33)
CO2 SERPL-SCNC: 15 MMOL/L (ref 20–33)
COLOR UR: YELLOW
CREAT SERPL-MCNC: 0.7 MG/DL (ref 0.5–1.4)
CREAT SERPL-MCNC: 0.83 MG/DL (ref 0.5–1.4)
EOSINOPHIL # BLD AUTO: 0.08 K/UL (ref 0–0.51)
EOSINOPHIL NFR BLD: 1.4 % (ref 0–6.9)
EPI CELLS #/AREA URNS HPF: ABNORMAL /HPF
ERYTHROCYTE [DISTWIDTH] IN BLOOD BY AUTOMATED COUNT: 43.1 FL (ref 35.9–50)
GFR SERPLBLD CREATININE-BSD FMLA CKD-EPI: 117 ML/MIN/1.73 M 2
GFR SERPLBLD CREATININE-BSD FMLA CKD-EPI: 95 ML/MIN/1.73 M 2
GLOBULIN SER CALC-MCNC: 2.1 G/DL (ref 1.9–3.5)
GLUCOSE SERPL-MCNC: 100 MG/DL (ref 65–99)
GLUCOSE SERPL-MCNC: 156 MG/DL (ref 65–99)
GLUCOSE UR STRIP.AUTO-MCNC: NEGATIVE MG/DL
HCG SERPL QL: NEGATIVE
HCT VFR BLD AUTO: 39.6 % (ref 37–47)
HGB BLD-MCNC: 14 G/DL (ref 12–16)
HYALINE CASTS #/AREA URNS LPF: ABNORMAL /LPF
IMM GRANULOCYTES # BLD AUTO: 0.02 K/UL (ref 0–0.11)
IMM GRANULOCYTES NFR BLD AUTO: 0.3 % (ref 0–0.9)
KETONES UR STRIP.AUTO-MCNC: NEGATIVE MG/DL
LEUKOCYTE ESTERASE UR QL STRIP.AUTO: ABNORMAL
LIPASE SERPL-CCNC: 28 U/L (ref 11–82)
LYMPHOCYTES # BLD AUTO: 1.65 K/UL (ref 1–4.8)
LYMPHOCYTES NFR BLD: 28.2 % (ref 22–41)
MCH RBC QN AUTO: 31.5 PG (ref 27–33)
MCHC RBC AUTO-ENTMCNC: 35.4 G/DL (ref 33.6–35)
MCV RBC AUTO: 89.2 FL (ref 81.4–97.8)
MICRO URNS: ABNORMAL
MONOCYTES # BLD AUTO: 0.41 K/UL (ref 0–0.85)
MONOCYTES NFR BLD AUTO: 7 % (ref 0–13.4)
NEUTROPHILS # BLD AUTO: 3.68 K/UL (ref 2–7.15)
NEUTROPHILS NFR BLD: 62.8 % (ref 44–72)
NITRITE UR QL STRIP.AUTO: NEGATIVE
NRBC # BLD AUTO: 0 K/UL
NRBC BLD-RTO: 0 /100 WBC
PH UR STRIP.AUTO: 6 [PH] (ref 5–8)
PLATELET # BLD AUTO: 205 K/UL (ref 164–446)
PMV BLD AUTO: 11 FL (ref 9–12.9)
POTASSIUM SERPL-SCNC: 3.8 MMOL/L (ref 3.6–5.5)
POTASSIUM SERPL-SCNC: 3.8 MMOL/L (ref 3.6–5.5)
PROT SERPL-MCNC: 6.9 G/DL (ref 6–8.2)
PROT UR QL STRIP: NEGATIVE MG/DL
RBC # BLD AUTO: 4.44 M/UL (ref 4.2–5.4)
RBC # URNS HPF: ABNORMAL /HPF
RBC UR QL AUTO: NEGATIVE
SODIUM SERPL-SCNC: 139 MMOL/L (ref 135–145)
SODIUM SERPL-SCNC: 141 MMOL/L (ref 135–145)
SP GR UR STRIP.AUTO: 1.02
UROBILINOGEN UR STRIP.AUTO-MCNC: 1 MG/DL
WBC # BLD AUTO: 5.9 K/UL (ref 4.8–10.8)
WBC #/AREA URNS HPF: ABNORMAL /HPF

## 2022-08-15 PROCEDURE — 700105 HCHG RX REV CODE 258: Performed by: EMERGENCY MEDICINE

## 2022-08-15 PROCEDURE — 96374 THER/PROPH/DIAG INJ IV PUSH: CPT

## 2022-08-15 PROCEDURE — 83690 ASSAY OF LIPASE: CPT

## 2022-08-15 PROCEDURE — 700111 HCHG RX REV CODE 636 W/ 250 OVERRIDE (IP): Performed by: EMERGENCY MEDICINE

## 2022-08-15 PROCEDURE — 80048 BASIC METABOLIC PNL TOTAL CA: CPT

## 2022-08-15 PROCEDURE — 99284 EMERGENCY DEPT VISIT MOD MDM: CPT

## 2022-08-15 PROCEDURE — 80053 COMPREHEN METABOLIC PANEL: CPT

## 2022-08-15 PROCEDURE — 81001 URINALYSIS AUTO W/SCOPE: CPT

## 2022-08-15 PROCEDURE — 96375 TX/PRO/DX INJ NEW DRUG ADDON: CPT

## 2022-08-15 PROCEDURE — 74176 CT ABD & PELVIS W/O CONTRAST: CPT | Mod: ME

## 2022-08-15 PROCEDURE — 84703 CHORIONIC GONADOTROPIN ASSAY: CPT

## 2022-08-15 PROCEDURE — 36415 COLL VENOUS BLD VENIPUNCTURE: CPT

## 2022-08-15 PROCEDURE — 85025 COMPLETE CBC W/AUTO DIFF WBC: CPT

## 2022-08-15 PROCEDURE — 87086 URINE CULTURE/COLONY COUNT: CPT

## 2022-08-15 RX ORDER — ONDANSETRON 2 MG/ML
4 INJECTION INTRAMUSCULAR; INTRAVENOUS ONCE
Status: COMPLETED | OUTPATIENT
Start: 2022-08-15 | End: 2022-08-15

## 2022-08-15 RX ORDER — CEFTRIAXONE 2 G/1
2 INJECTION, POWDER, FOR SOLUTION INTRAMUSCULAR; INTRAVENOUS ONCE
Status: COMPLETED | OUTPATIENT
Start: 2022-08-15 | End: 2022-08-15

## 2022-08-15 RX ORDER — KETOROLAC TROMETHAMINE 30 MG/ML
15 INJECTION, SOLUTION INTRAMUSCULAR; INTRAVENOUS ONCE
Status: COMPLETED | OUTPATIENT
Start: 2022-08-15 | End: 2022-08-15

## 2022-08-15 RX ORDER — SULFAMETHOXAZOLE AND TRIMETHOPRIM 800; 160 MG/1; MG/1
1 TABLET ORAL 2 TIMES DAILY
Qty: 14 TABLET | Refills: 0 | Status: SHIPPED | OUTPATIENT
Start: 2022-08-15 | End: 2022-08-22

## 2022-08-15 RX ORDER — SODIUM CHLORIDE 9 MG/ML
1000 INJECTION, SOLUTION INTRAVENOUS ONCE
Status: COMPLETED | OUTPATIENT
Start: 2022-08-15 | End: 2022-08-15

## 2022-08-15 RX ADMIN — CEFTRIAXONE SODIUM 2 G: 2 INJECTION, POWDER, FOR SOLUTION INTRAMUSCULAR; INTRAVENOUS at 19:27

## 2022-08-15 RX ADMIN — ONDANSETRON 4 MG: 2 INJECTION INTRAMUSCULAR; INTRAVENOUS at 18:17

## 2022-08-15 RX ADMIN — SODIUM CHLORIDE 1000 ML: 9 INJECTION, SOLUTION INTRAVENOUS at 18:17

## 2022-08-15 RX ADMIN — KETOROLAC TROMETHAMINE 15 MG: 30 INJECTION, SOLUTION INTRAMUSCULAR at 18:17

## 2022-08-15 ASSESSMENT — FIBROSIS 4 INDEX: FIB4 SCORE: 0.45

## 2022-08-15 NOTE — PROGRESS NOTES
CHW Vinson called the patient to follow up.   Pt states that she is at a doctors appointment & cannot talk right now.     Pt states that she will call CCM.     Plan: CHW will wait for a call back from the patient.

## 2022-08-15 NOTE — ED TRIAGE NOTES
"Chief Complaint   Patient presents with    Flank Pain     7/10 right side. Pt denies N/V/D fever. Pain started yesterday per pt.      /82   Pulse 95   Temp 36.7 °C (98 °F) (Temporal)   Resp 17   Ht 1.6 m (5' 3\")   Wt 111 kg (244 lb 11.4 oz)   SpO2 90%   BMI 43.35 kg/m²     "

## 2022-08-16 ENCOUNTER — HOSPITAL ENCOUNTER (EMERGENCY)
Facility: MEDICAL CENTER | Age: 32
End: 2022-08-16
Attending: EMERGENCY MEDICINE
Payer: MEDICARE

## 2022-08-16 ENCOUNTER — APPOINTMENT (OUTPATIENT)
Dept: RADIOLOGY | Facility: MEDICAL CENTER | Age: 32
End: 2022-08-16
Attending: EMERGENCY MEDICINE
Payer: MEDICARE

## 2022-08-16 VITALS
TEMPERATURE: 97 F | BODY MASS INDEX: 43.36 KG/M2 | RESPIRATION RATE: 20 BRPM | HEIGHT: 63 IN | WEIGHT: 244.71 LBS | OXYGEN SATURATION: 97 % | SYSTOLIC BLOOD PRESSURE: 105 MMHG | HEART RATE: 70 BPM | DIASTOLIC BLOOD PRESSURE: 56 MMHG

## 2022-08-16 DIAGNOSIS — R07.9 CHEST PAIN, UNSPECIFIED TYPE: ICD-10-CM

## 2022-08-16 LAB
ALBUMIN SERPL BCP-MCNC: 4.4 G/DL (ref 3.2–4.9)
ALBUMIN/GLOB SERPL: 2.4 G/DL
ALP SERPL-CCNC: 72 U/L (ref 30–99)
ALT SERPL-CCNC: 14 U/L (ref 2–50)
ANION GAP SERPL CALC-SCNC: 12 MMOL/L (ref 7–16)
AST SERPL-CCNC: 10 U/L (ref 12–45)
BILIRUB SERPL-MCNC: 0.3 MG/DL (ref 0.1–1.5)
BUN SERPL-MCNC: 11 MG/DL (ref 8–22)
CALCIUM SERPL-MCNC: 8.9 MG/DL (ref 8.5–10.5)
CHLORIDE SERPL-SCNC: 114 MMOL/L (ref 96–112)
CO2 SERPL-SCNC: 15 MMOL/L (ref 20–33)
CREAT SERPL-MCNC: 0.75 MG/DL (ref 0.5–1.4)
EKG IMPRESSION: NORMAL
ERYTHROCYTE [DISTWIDTH] IN BLOOD BY AUTOMATED COUNT: 45.3 FL (ref 35.9–50)
GFR SERPLBLD CREATININE-BSD FMLA CKD-EPI: 108 ML/MIN/1.73 M 2
GLOBULIN SER CALC-MCNC: 1.8 G/DL (ref 1.9–3.5)
GLUCOSE SERPL-MCNC: 99 MG/DL (ref 65–99)
HCT VFR BLD AUTO: 38.3 % (ref 37–47)
HGB BLD-MCNC: 13 G/DL (ref 12–16)
MCH RBC QN AUTO: 31.3 PG (ref 27–33)
MCHC RBC AUTO-ENTMCNC: 33.9 G/DL (ref 33.6–35)
MCV RBC AUTO: 92.3 FL (ref 81.4–97.8)
PLATELET # BLD AUTO: 175 K/UL (ref 164–446)
PMV BLD AUTO: 11.2 FL (ref 9–12.9)
POTASSIUM SERPL-SCNC: 4.2 MMOL/L (ref 3.6–5.5)
PROT SERPL-MCNC: 6.2 G/DL (ref 6–8.2)
RBC # BLD AUTO: 4.15 M/UL (ref 4.2–5.4)
SODIUM SERPL-SCNC: 141 MMOL/L (ref 135–145)
TROPONIN T SERPL-MCNC: <6 NG/L (ref 6–19)
WBC # BLD AUTO: 5.2 K/UL (ref 4.8–10.8)

## 2022-08-16 PROCEDURE — 700111 HCHG RX REV CODE 636 W/ 250 OVERRIDE (IP): Performed by: EMERGENCY MEDICINE

## 2022-08-16 PROCEDURE — 36415 COLL VENOUS BLD VENIPUNCTURE: CPT

## 2022-08-16 PROCEDURE — 84484 ASSAY OF TROPONIN QUANT: CPT

## 2022-08-16 PROCEDURE — 93005 ELECTROCARDIOGRAM TRACING: CPT

## 2022-08-16 PROCEDURE — 99284 EMERGENCY DEPT VISIT MOD MDM: CPT

## 2022-08-16 PROCEDURE — 96374 THER/PROPH/DIAG INJ IV PUSH: CPT

## 2022-08-16 PROCEDURE — 85027 COMPLETE CBC AUTOMATED: CPT

## 2022-08-16 PROCEDURE — 93005 ELECTROCARDIOGRAM TRACING: CPT | Performed by: EMERGENCY MEDICINE

## 2022-08-16 PROCEDURE — 80053 COMPREHEN METABOLIC PANEL: CPT

## 2022-08-16 PROCEDURE — 71045 X-RAY EXAM CHEST 1 VIEW: CPT

## 2022-08-16 RX ORDER — KETOROLAC TROMETHAMINE 30 MG/ML
15 INJECTION, SOLUTION INTRAMUSCULAR; INTRAVENOUS ONCE
Status: COMPLETED | OUTPATIENT
Start: 2022-08-16 | End: 2022-08-16

## 2022-08-16 RX ADMIN — KETOROLAC TROMETHAMINE 15 MG: 30 INJECTION, SOLUTION INTRAMUSCULAR at 13:45

## 2022-08-16 ASSESSMENT — FIBROSIS 4 INDEX: FIB4 SCORE: 0.48

## 2022-08-16 NOTE — ED TRIAGE NOTES
Pt amb to triage.  Chief Complaint   Patient presents with    Chest Pain     Left sided since this am, described as deep pain, radiates into left arm and jaw     Pt reports she was dx w/ a kidney stone yesterday and does not feel well today.

## 2022-08-16 NOTE — ED NOTES
"Pt up to BR self with steady gait. Reports \"some\" relief from toradol, but continues to have pain. ERP notified.   "

## 2022-08-16 NOTE — ED PROVIDER NOTES
ED Provider Note    CHIEF COMPLAINT  Flank pain    HPI  Kristin Balderrama is a 31 y.o. female who presents to the emergency department for the evaluation of flank pain.  Patient states that she started having right-sided flank pain yesterday.  She states that it got worse throughout the day and today has been out of control.  She describes it as sharp and radiating to the right lower quadrant.  She states that it feels similar to when she has had a kidney stone in the past.  She denies any fevers.  She states that she has had associated nausea but no vomiting.  She denies any abdominal pain.  She states that she is currently being treated for a yeast infection.  She states that she was recently diagnosed with a viral URI but this resolved.  She has not had any chest pain or shortness of breath.      REVIEW OF SYSTEMS  See HPI for further details. All other systems are negative.     PAST MEDICAL HISTORY   has a past medical history of Abdominal pain, Anginal syndrome, Apnea, sleep, Arrhythmia, Arthritis, ASTHMA, Back pain, Borderline personality disorder (HCC), Breath shortness, Bronchitis (02/08/2022), Cardiac arrhythmia, Chickenpox, Chronic UTI (09/18/2010), Cough, Daytime sleepiness, Dental disorder (03/08/2021), Depression, Diabetes ([facility]), Diarrhea, Disorder of thyroid, Fall, Fatigue, Frequent headaches, Gasping for breath, Gynecological disorder, Headache(784.0), Heart burn, Heart murmur, History of falling, Indigestion, Migraine, 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    SOCIAL HISTORY  Social  History     Tobacco Use    Smoking status: Never    Smokeless tobacco: Never   Vaping Use    Vaping Use: Never used   Substance and Sexual Activity    Alcohol use: No     Alcohol/week: 0.0 oz    Drug use: Not Currently     Frequency: 7.0 times per week     Types: Marijuana    Sexual activity: Not Currently     Birth control/protection: Implant       SURGICAL HISTORY   has a past surgical history that includes neuro dest facet l/s w/ig sngl (4/21/2015); recovery (1/27/2016); [place] by laparoscopy (8/29/2010); lumbar fusion anterior (8/21/2012); other cardiac surgery (1/2016); tonsillectomy; bowel stimulator insertion (7/13/2016); gastroscopy with balloon dilatation (N/A, 1/4/2017); muscle biopsy (Right, 1/26/2017); other abdominal surgery; laminotomy; bowel stimulator insertion (3/10/2021); lap,diagnostic abdomen (2/14/2022); and ovarian cystectomy (Right, 2/14/2022).    CURRENT MEDICATIONS  Home Medications       Reviewed by Alia Lopez R.N. (Registered Nurse) on 08/15/22 at 1537  Med List Status: Not Addressed     Medication Last Dose Status   albuterol 108 (90 Base) MCG/ACT Aero Soln inhalation aerosol  Active   butalbital/apap/caffeine (FIORICET) -40 mg Tab  Active   docusate sodium (COLACE) 100 MG Cap  Active   ivabradine (CORLANOR) 7.5 MG Tab tablet  Active   lactulose 20 GM/30ML Solution  Active   Melatonin 10 MG Tab  Active   ondansetron (ZOFRAN ODT) 4 MG TABLET DISPERSIBLE  Active   SUMAtriptan (IMITREX) 50 MG Tab  Active   traZODone (DESYREL) 100 MG Tab  Active   ziprasidone (GEODON) 40 MG Cap  Active                    ALLERGIES  Allergies   Allergen Reactions    Depakote [Divalproex Sodium] Unspecified     Muscle spasms/muscle pain and weakness      Doxycycline Anaphylaxis and Vomiting     Other reaction(s): pustules/blisters    Montelukast [Singulair] Unspecified     Cardiac effusion    Vancomycin Itching     Pt becomes flushed in face and chest.   RXN=7/10/16    Amitriptyline Unspecified      "Headaches      Aripiprazole [Abilify] Unspecified     Headaches/muscle twitching      Clindamycin Nausea           Flomax [Tamsulosin Hydrochloride] Swelling    Metformin Unspecified     Increased lactic acid     Other reaction(s): itching and rash/nausea vomiting    Tamsulosin Swelling     Swelling of legs    Tape Rash     Tears skin off  coban with Tegaderm tape ok intermittently  RXN=ongoing    Wound Dressing Adhesive Hives     By pt report    Ampicillin Rash     Pt reports that she received a rash     Ciprofloxacin Rash          Keflex Rash     Pt states she gets a rash with this medication  Tolerates ceftriaxone  Can take with Benadryl    Levofloxacin Unspecified     Leg muscle cramps    Metronidazole Rash     \"Vision problems\"    Penicillins Hives     Can take with Benadryl    Sulfa Drugs Itching and Myalgia     Muscle pain and weakness    Valproic Acid Rash     .       PHYSICAL EXAM  VITAL SIGNS: BP (!) 141/66   Pulse 72   Temp 36.7 °C (98 °F) (Temporal)   Resp 17   Ht 1.6 m (5' 3\")   Wt 111 kg (244 lb 11.4 oz)   SpO2 98%   BMI 43.35 kg/m²    Constitutional: Alert and in no apparent distress.  HENT: Normocephalic atraumatic. Bilateral external ears normal. Nose normal. Mucous membranes are moist.  Eyes: Pupils are equal and reactive. Conjunctiva normal. Non-icteric sclera.   Neck: Normal range of motion without tenderness. Supple. No meningeal signs.  Cardiovascular: Regular rate and rhythm. No murmurs, gallops or rubs.  Thorax & Lungs: Breath sounds are clear to auscultation bilaterally. No wheezing, rhonchi or rales.  Abdomen: Soft, nontender and nondistended. No peritoneal signs noted.  Skin: Warm and dry. No rashes are noted.  Back: No bony tenderness, right-sided CVA tenderness present.  No left-sided CVA tenderness.  Extremities: 2+ peripheral pulses. Cap refill is less than 2 seconds. No edema, cyanosis, or clubbing.  Musculoskeletal: Good range of motion in all major joints. No tenderness to " palpation or major deformities noted.   Neurologic: Alert and oriented ×3. The patient moves all 4 extremities and follows commands.  Psychiatric: Affect is normal. Judgment appears to be intact.    DIAGNOSTIC STUDIES / PROCEDURES    LABS  Results for orders placed or performed during the hospital encounter of 08/15/22   CBC WITH DIFFERENTIAL   Result Value Ref Range    WBC 5.9 4.8 - 10.8 K/uL    RBC 4.44 4.20 - 5.40 M/uL    Hemoglobin 14.0 12.0 - 16.0 g/dL    Hematocrit 39.6 37.0 - 47.0 %    MCV 89.2 81.4 - 97.8 fL    MCH 31.5 27.0 - 33.0 pg    MCHC 35.4 (H) 33.6 - 35.0 g/dL    RDW 43.1 35.9 - 50.0 fL    Platelet Count 205 164 - 446 K/uL    MPV 11.0 9.0 - 12.9 fL    Neutrophils-Polys 62.80 44.00 - 72.00 %    Lymphocytes 28.20 22.00 - 41.00 %    Monocytes 7.00 0.00 - 13.40 %    Eosinophils 1.40 0.00 - 6.90 %    Basophils 0.30 0.00 - 1.80 %    Immature Granulocytes 0.30 0.00 - 0.90 %    Nucleated RBC 0.00 /100 WBC    Neutrophils (Absolute) 3.68 2.00 - 7.15 K/uL    Lymphs (Absolute) 1.65 1.00 - 4.80 K/uL    Monos (Absolute) 0.41 0.00 - 0.85 K/uL    Eos (Absolute) 0.08 0.00 - 0.51 K/uL    Baso (Absolute) 0.02 0.00 - 0.12 K/uL    Immature Granulocytes (abs) 0.02 0.00 - 0.11 K/uL    NRBC (Absolute) 0.00 K/uL   COMP METABOLIC PANEL   Result Value Ref Range    Sodium 141 135 - 145 mmol/L    Potassium 3.8 3.6 - 5.5 mmol/L    Chloride 114 (H) 96 - 112 mmol/L    Co2 14 (L) 20 - 33 mmol/L    Anion Gap 13.0 7.0 - 16.0    Glucose 156 (H) 65 - 99 mg/dL    Bun 12 8 - 22 mg/dL    Creatinine 0.83 0.50 - 1.40 mg/dL    Calcium 9.2 8.5 - 10.5 mg/dL    AST(SGOT) 12 12 - 45 U/L    ALT(SGPT) 15 2 - 50 U/L    Alkaline Phosphatase 81 30 - 99 U/L    Total Bilirubin 0.4 0.1 - 1.5 mg/dL    Albumin 4.8 3.2 - 4.9 g/dL    Total Protein 6.9 6.0 - 8.2 g/dL    Globulin 2.1 1.9 - 3.5 g/dL    A-G Ratio 2.3 g/dL   LIPASE   Result Value Ref Range    Lipase 28 11 - 82 U/L   HCG QUAL SERUM   Result Value Ref Range    Beta-Hcg Qualitative Serum Negative  Negative   URINALYSIS,CULTURE IF INDICATED    Specimen: Urine   Result Value Ref Range    Color Yellow     Character Clear     Specific Gravity 1.023 <1.035    Ph 6.0 5.0 - 8.0    Glucose Negative Negative mg/dL    Ketones Negative Negative mg/dL    Protein Negative Negative mg/dL    Bilirubin Negative Negative    Urobilinogen, Urine 1.0 Negative    Nitrite Negative Negative    Leukocyte Esterase Moderate (A) Negative    Occult Blood Negative Negative    Micro Urine Req Microscopic    ESTIMATED GFR   Result Value Ref Range    GFR (CKD-EPI) 95 >60 mL/min/1.73 m 2   URINE MICROSCOPIC (W/UA)   Result Value Ref Range    WBC  (A) /hpf    RBC 2-5 (A) /hpf    Bacteria Negative None /hpf    Epithelial Cells Moderate (A) /hpf    Hyaline Cast 6-10 (A) /lpf   URINE CULTURE(NEW)    Specimen: Urine   Result Value Ref Range    Significant Indicator NEG     Source UR     Site -     Culture Result -    Basic Metabolic Panel   Result Value Ref Range    Sodium 139 135 - 145 mmol/L    Potassium 3.8 3.6 - 5.5 mmol/L    Chloride 113 (H) 96 - 112 mmol/L    Co2 15 (L) 20 - 33 mmol/L    Glucose 100 (H) 65 - 99 mg/dL    Bun 11 8 - 22 mg/dL    Creatinine 0.70 0.50 - 1.40 mg/dL    Calcium 8.2 (L) 8.5 - 10.5 mg/dL    Anion Gap 11.0 7.0 - 16.0   ESTIMATED GFR   Result Value Ref Range    GFR (CKD-EPI) 117 >60 mL/min/1.73 m 2     *Note: Due to a large number of results and/or encounters for the requested time period, some results have not been displayed. A complete set of results can be found in Results Review.     RADIOLOGY  CT-RENAL COLIC EVALUATION(A/P W/O)   Final Result      1.  Small nonobstructing RIGHT kidney stone.   2.  No ureteral stone or hydronephrosis.   3.  Normal appendix.        COURSE & MEDICAL DECISION MAKING  Pertinent Labs & Imaging studies reviewed. (See chart for details)    This is a 31-year-old female presenting to the emergency department for evaluation of right-sided flank pain.  On initial evaluation, patient did  not appear to be in any acute distress.  Vital signs are normal and reassuring.  She did have right-sided CVA tenderness but her abdominal exam was benign.  Her physical exam was otherwise unremarkable.    Work-up was initiated in triage.  White count was normal and reassuring.  H&H were also within normal limits.  Pregnancy test was negative.  CMP was notable for bicarb of 14.  She was given IV fluid bolus.  Repeat metabolic panel was ordered and her bicarb was improving.      CT was obtained and revealed a small nonobstructing right kidney stone.  No hydronephrosis or ureteral stone were noted.  Urinalysis was notable for  WBCs and I suspect that the patient's symptoms are likely secondary to pyelonephritis.  Given her reassuring labs and lack of fevers with normal vital signs here in the ED and less concern for sepsis.  The urine were sent for culture.  I reviewed the patient's antibiotics with pharmacy given her extensive allergies.  The patient was given a dose of ceftriaxone here in the ED.  She is given a prescription for Bactrim to go home with.  I also discussed these antibiotics with the patient and she agreed with the plan.    The patient presents with abdominal pain without signs of peritonitis or other life-threatening or serious etiology. The patient appears stable for discharge and has been instructed to return immediately if the symptoms worsen in any way, or in 8-12hr if not improved for re-evaluation. The patient has been instructed to return if the symptoms worsen or change in any way.    FINAL IMPRESSION  1. Pyelonephritis      PRESCRIPTIONS  New Prescriptions    SULFAMETHOXAZOLE-TRIMETHOPRIM (BACTRIM DS) 800-160 MG TABLET    Take 1 Tablet by mouth 2 times a day for 7 days.     FOLLOW UP  Torres Brody M.D.  [address] [facility]  [address]  [place] [id number] [phone]    Call in 1 day  To schedule a follow up appointment    [facility], Emergency Dept  [address]  [facility] [id number] [phone]  Go to   As needed    -DISCHARGE-    Electronically signed by: Bhavana Cheek D.O., 8/15/2022 5:01 PM

## 2022-08-16 NOTE — ED NOTES
"All discharge instructions given to pt.  Pt reports that she is \"irritated that the doctor told me to take tylenol and ibuprofen, they are not a cure all.\" emotional support provided.   Pt verbalized understanding of all discharge instructions. All lines removed prior to discharge. All questions answered.     "

## 2022-08-16 NOTE — ED PROVIDER NOTES
ED Provider Note    ER PROVIDER NOTE      CHIEF COMPLAINT  Chief Complaint   Patient presents with    Chest Pain     Left sided since this am, described as deep pain, radiates into left arm and jaw       HPI  Kristin Balderrama is a 31 y.o. female who presents to the emergency department complaining of left-sided chest pain.  Patient reports she awoke with the pain and has been constant since she woke.  She reports it is a deep aching type pain with some radiation into her left neck and shoulder.  She reports it does seem to be worse with moving or twisting it is not exertional, it is not pleuritic.  She reports no shortness of breath or diaphoresis.  She reports no nausea or vomiting.  No leg pain or swelling.  No ripping or tearing sensation.  She does report some right-sided flank pain and was diagnosed with a kidney stone yesterday.  No fevers or chills.    REVIEW OF SYSTEMS  Pertinent positives include chest pain. Pertinent negatives include no feve. See HPI for details. All other systems reviewed and are negative.    PAST MEDICAL HISTORY   has a past medical history of Abdominal pain, Anginal syndrome, Apnea, sleep, Arrhythmia, Arthritis, ASTHMA, Back pain, Borderline personality disorder ([facility]), Breath shortness, Bronchitis (02/08/2022), Cardiac arrhythmia, Chickenpox, Chronic UTI (09/18/2010), Cough, Daytime sleepiness, Dental disorder (03/08/2021), Depression, Diabetes ([facility]), Diarrhea, Disorder of thyroid, Fall, Fatigue, Frequent headaches, Gasping for breath, Gynecological disorder, Headache(784.0), Heart burn, Heart murmur, History of falling, Indigestion, Migraine, 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    SURGICAL HISTORY   has a past surgical history that includes neuro dest facet l/s w/ig sngl (4/21/2015); recovery (1/27/2016); delmar by laparoscopy (8/29/2010); lumbar fusion anterior (8/21/2012); other cardiac surgery (1/2016); tonsillectomy; bowel stimulator insertion (7/13/2016); gastroscopy with balloon dilatation (N/A, 1/4/2017); muscle biopsy (Right, 1/26/2017); other abdominal surgery; laminotomy; bowel stimulator insertion (3/10/2021); lap,diagnostic abdomen (2/14/2022); and ovarian cystectomy (Right, 2/14/2022).    FAMILY HISTORY  Family History   Problem Relation Age of Onset    Hypertension Mother     Sleep Apnea Mother     Heart Disease Mother     Other Mother         hypothryod    Hypertension Maternal Uncle     Heart Disease Maternal Grandmother     Hypertension Maternal Grandmother     No Known Problems Sister     Other Sister         Narcolepsy;fibromyalsia;bone;nerve    Genitourinary () Problems Sister         endometriosis       SOCIAL HISTORY  Social History     Socioeconomic History    Marital status: Single   Tobacco Use    Smoking status: Never    Smokeless tobacco: Never   Vaping Use    Vaping Use: Never used   Substance and Sexual Activity    Alcohol use: No     Alcohol/week: 0.0 oz    Drug use: Not Currently     Frequency: 7.0 times per week     Types: Marijuana    Sexual activity: Not Currently     Birth control/protection: Implant   Social History Narrative    ** Merged History Encounter **           Social History     Substance and Sexual Activity   Drug Use Not Currently    Frequency: 7.0 times per week    Types: Marijuana       CURRENT MEDICATIONS  Home Medications       Reviewed by Rachele Hope R.N. (Registered Nurse) on 08/16/22 at 1145  Med List Status: Not Addressed     Medication Last Dose Status   albuterol 108 (90 Base) MCG/ACT Aero Soln inhalation  "aerosol  Active   butalbital/apap/caffeine (FIORICET) -40 mg Tab  Active   docusate sodium (COLACE) 100 MG Cap  Active   ivabradine (CORLANOR) 7.5 MG Tab tablet  Active   lactulose 20 GM/30ML Solution  Active   Melatonin 10 MG Tab  Active   ondansetron (ZOFRAN ODT) 4 MG TABLET DISPERSIBLE  Active   sulfamethoxazole-trimethoprim (BACTRIM DS) 800-160 MG tablet  Active   SUMAtriptan (IMITREX) 50 MG Tab  Active   traZODone (DESYREL) 100 MG Tab  Active   ziprasidone (GEODON) 40 MG Cap  Active                    ALLERGIES  Allergies   Allergen Reactions    Depakote [Divalproex Sodium] Unspecified     Muscle spasms/muscle pain and weakness      Doxycycline Anaphylaxis and Vomiting     Other reaction(s): pustules/blisters    Montelukast [Singulair] Unspecified     Cardiac effusion    Vancomycin Itching     Pt becomes flushed in face and chest.   RXN=7/10/16    Amitriptyline Unspecified     Headaches      Aripiprazole [Abilify] Unspecified     Headaches/muscle twitching      Clindamycin Nausea           Flomax [Tamsulosin Hydrochloride] Swelling    Metformin Unspecified     Increased lactic acid     Other reaction(s): itching and rash/nausea vomiting    Tamsulosin Swelling     Swelling of legs    Tape Rash     Tears skin off  coban with Tegaderm tape ok intermittently  RXN=ongoing    Wound Dressing Adhesive Hives     By pt report    Ampicillin Rash     Pt reports that she received a rash     Ciprofloxacin Rash          Keflex Rash     Pt states she gets a rash with this medication  Tolerates ceftriaxone  Can take with Benadryl    Levofloxacin Unspecified     Leg muscle cramps    Metronidazole Rash     \"Vision problems\"    Penicillins Hives     Can take with Benadryl    Sulfa Drugs Itching and Myalgia     Muscle pain and weakness    Valproic Acid Rash     .       PHYSICAL EXAM  VITAL SIGNS: /56   Pulse 70   Temp 36.1 °C (97 °F) (Temporal)   Resp 20   Ht 1.6 m (5' 3\")   Wt 111 kg (244 lb 11.4 oz)   SpO2 97%  "  BMI 43.35 kg/m²   Pulse ox interpretation: I interpret this pulse ox as normal.    Constitutional: Alert in no apparent distress.  HENT: No signs of trauma, Bilateral external ears normal, Nose normal.   Eyes: Pupils are equal and reactive, Conjunctiva normal, Non-icteric.   Neck: Normal range of motion, No tenderness, Supple, No stridor.   Lymphatic: No lymphadenopathy noted.   Cardiovascular: Regular rate and rhythm, no murmurs.   Thorax & Lungs: Normal breath sounds, No respiratory distress, No wheezing, No chest tenderness.   Abdomen: Bowel sounds normal, Soft, No tenderness, No masses, No pulsatile masses. No peritoneal signs.  Skin: Warm, Dry, No erythema, No rash.   Back: No bony tenderness, No CVA tenderness.   Extremities: Intact distal pulses, No edema, No tenderness, No cyanosis, Negative Dhiraj's sign.  Musculoskeletal: Good range of motion in all major joints. No tenderness to palpation or major deformities noted.   Neurologic: Alert , Normal motor function, Normal sensory function, No focal deficits noted.   Psychiatric: Affect normal, Judgment normal, Mood normal.     DIAGNOSTIC STUDIES / PROCEDURES    Results for orders placed or performed during the hospital encounter of 08/16/22   TROPONIN   Result Value Ref Range    Troponin T <6 6 - 19 ng/L   COMP METABOLIC PANEL   Result Value Ref Range    Sodium 141 135 - 145 mmol/L    Potassium 4.2 3.6 - 5.5 mmol/L    Chloride 114 (H) 96 - 112 mmol/L    Co2 15 (L) 20 - 33 mmol/L    Anion Gap 12.0 7.0 - 16.0    Glucose 99 65 - 99 mg/dL    Bun 11 8 - 22 mg/dL    Creatinine 0.75 0.50 - 1.40 mg/dL    Calcium 8.9 8.5 - 10.5 mg/dL    AST(SGOT) 10 (L) 12 - 45 U/L    ALT(SGPT) 14 2 - 50 U/L    Alkaline Phosphatase 72 30 - 99 U/L    Total Bilirubin 0.3 0.1 - 1.5 mg/dL    Albumin 4.4 3.2 - 4.9 g/dL    Total Protein 6.2 6.0 - 8.2 g/dL    Globulin 1.8 (L) 1.9 - 3.5 g/dL    A-G Ratio 2.4 g/dL   CBC WITHOUT DIFFERENTIAL   Result Value Ref Range    WBC 5.2 4.8 - 10.8 K/uL     RBC 4.15 (L) 4.20 - 5.40 M/uL    Hemoglobin 13.0 12.0 - 16.0 g/dL    Hematocrit 38.3 37.0 - 47.0 %    MCV 92.3 81.4 - 97.8 fL    MCH 31.3 27.0 - 33.0 pg    MCHC 33.9 33.6 - 35.0 g/dL    RDW 45.3 35.9 - 50.0 fL    Platelet Count 175 164 - 446 K/uL    MPV 11.2 9.0 - 12.9 fL   ESTIMATED GFR   Result Value Ref Range    GFR (CKD-EPI) 108 >60 mL/min/1.73 m 2   EKG (NOW)   Result Value Ref Range    Report       [facility] Emergency Dept.    Test Date:  2022  Pt Name:    HARLEY DE LA CRUZ              Department: ER  MRN:        [id number]                      Room:  Gender:     Female                       Technician: HRR  :        [birth year][id number]                   Requested By:ER TRIAGE PROTOCOL  Order #:    [id number]                    Reading MD: RAGHU CONTRERAS MD    Measurements  Intervals                                Axis  Rate:       68                           P:          12  RI:         144                          QRS:        11  QRSD:       94                           T:          0  QT:         424  QTc:        451    Interpretive Statements  SINUS RHYTHM  BORDERLINE T ABNORMALITIES, ANTERIOR LEADS  BASELINE WANDER IN LEAD(S) I,III,aVR,aVL,aVF  Compared to ECG 2022 18:07:52  T-wave abnormality now present  Sinus tachycardia no longer present  Electronically Signed On 2022 14:36:18 PDT by RAGHU CONTRERAS MD       *Note: Due to a large number of results and/or encounters for the requested time period, some results have not been displayed. A complete set of results can be found in Results Review.       RADIOLOGY  DX-CHEST-PORTABLE (1 VIEW)   Final Result      No acute cardiac or pulmonary abnormalities are identified.        The radiologist's interpretation of all radiological studies have been reviewed and images independently viewed by me.    COURSE & MEDICAL DECISION MAKING  Nursing notes, VS, PMSFHx reviewed in chart.    1:10 PM Patient seen and examined at bedside.  Patient will be treated with Toradol. Ordered for CBC, CMP, troponin, ECG, x-ray to evaluate her symptoms.     Review of records from yesterday show a small nonobstructing right kidney stone, no ureteral stones    3:54 PM  discussed results and plan for discharge to which the patient is agreeable          Decision Making:  This is a 31 y.o. female presented with chest pain.  This does not appear to represent any acute or emergent process at this time. ACS is less likely given atypical nature of pain, ECG with no ischemic changes, negative troponin with many hours of symptoms, HEART score of 1 aThis was discussed with patient and need for follow up was emphasized. PE is less likely given nature of pain not consistent with PE and PERC negative. Dissection, aneurysm and pneumothorax are unlikely given the nature of the pain  as well as Xray lacking evidence of either. Pneumonia or other infection is less likely given no fever or infectious symptoms as well as no radiographic evidence. Other non emergent causes such as costochondritis, muscle strain,GI causes were also considered    I discussed strict return precautions with pt including shortness of breath, new/persistent/worse pain, syncope, vomiting, fever, palpitations, abdominal pain or any other concerns. Pt understood these well and was instructed to follow up with PCP.    The patient will return for new or worsening symptoms and is stable at the time of discharge.    The patient is referred to a primary physician for blood pressure management, diabetic screening, and for all other preventative health concerns.      DISPOSITION:  Patient will be discharged home in stable condition.    FOLLOW UP:  Torres Brody M.D.  [address]  [address]  [facility] [id number] [phone]    In 1 week      OUTPATIENT MEDICATIONS:  New Prescriptions    No medications on file         FINAL IMPRESSION  1. Chest pain, unspecified type          The note accurately reflects  work and decisions made by me.  Torres Roberson M.D.  8/16/2022  3:55 PM

## 2022-08-17 LAB
BACTERIA UR CULT: NORMAL
SIGNIFICANT IND 70042: NORMAL
SITE SITE: NORMAL
SOURCE SOURCE: NORMAL

## 2022-08-19 ENCOUNTER — OFFICE VISIT (OUTPATIENT)
Dept: URGENT CARE | Facility: CLINIC | Age: 32
End: 2022-08-19
Payer: MEDICARE

## 2022-08-19 VITALS
WEIGHT: 238.6 LBS | SYSTOLIC BLOOD PRESSURE: 106 MMHG | RESPIRATION RATE: 16 BRPM | OXYGEN SATURATION: 98 % | HEIGHT: 65 IN | HEART RATE: 99 BPM | DIASTOLIC BLOOD PRESSURE: 66 MMHG | TEMPERATURE: 97.9 F | BODY MASS INDEX: 39.75 KG/M2

## 2022-08-19 DIAGNOSIS — R33.9 URINARY RETENTION: ICD-10-CM

## 2022-08-19 DIAGNOSIS — R39.198 DIFFICULTY URINATING: ICD-10-CM

## 2022-08-19 PROCEDURE — 99214 OFFICE O/P EST MOD 30 MIN: CPT | Performed by: NURSE PRACTITIONER

## 2022-08-19 ASSESSMENT — ENCOUNTER SYMPTOMS
FEVER: 0
CHILLS: 0
NAUSEA: 1
EMPTYING BLADDER: 1
FLANK PAIN: 1

## 2022-08-19 ASSESSMENT — FIBROSIS 4 INDEX: FIB4 SCORE: 0.49

## 2022-08-20 NOTE — PROGRESS NOTES
"Subjective:     Kristin Balderrama is a 31 y.o. female who presents for Cystitis (Not able to pee for 8 hours )      Cystitis   Associated symptoms include flank pain and nausea. Pertinent negatives include no chills.   Pt presents for evaluation of a new problem.  Duarte is a very pleasant 31-year-old female who presents to urgent care today with complaints of inability to void for the past 8 hours.  She was seen in the emergency room 4 days ago and diagnosed with pyelonephritis.  It was also found on a CT renal colic exam that she is also suffering from a small nonobstructing right renal stone.  No hydronephrosis or ureteral stones were noted.  She was placed on Bactrim and discharged home.  She has been taking her antibiotics as prescribed.  For the past few days she has been suffering from incontinence.  Today however, she developed inability to urinate.  She does note straining in order to urinate and this does cause significant pain.  She notes that she does have a very significant urologic history and has had a suprapubic catheter placed twice before.  She states that she does have a floating bladder.  She notes her pain at this time is \"uncomfortable \".  Negative for current fever, chills, or vomiting.  She does continue to have right-sided flank pain.     Review of Systems   Constitutional:  Negative for chills and fever.   Gastrointestinal:  Positive for nausea.   Genitourinary:  Positive for dysuria and flank pain.     PMH:   Past Medical History:   Diagnosis Date    Abdominal pain     Anginal syndrome     random chest pain especially with tachycardia    Apnea, sleep     Arrhythmia     \"sinus tachycardia\", cariologist, Dr. Kumar; ablation 2/2016    Arthritis     osteo    ASTHMA     when around smoke    Back pain     Borderline personality disorder (HCC)     Breath shortness     with tachycardia    Bronchitis 02/08/2022    Last time was 12/21    Cardiac arrhythmia     Chickenpox     Chronic UTI " "09/18/2010    Cough     Daytime sleepiness     Dental disorder 03/08/2021    infected tooth    Depression     Diabetes (HCC)     Diarrhea     incontinece    Disorder of thyroid     Hashimoto's    Fall     Fatigue     Frequent headaches     Gasping for breath     Gynecological disorder     PCOS    Headache(784.0)     Heart burn     Heart murmur     History of falling     Indigestion     Migraine     Mitochondrial disease (HCC)     Multiple personality disorder (HCC)     Nausea     Obesity     Other fatigue 06/29/2020    Pain 08/15/2012    back, 7/10    Painful joint     PCOS (polycystic ovarian syndrome)     Pneumonia 2012 12/2020    Psychosis (HCC)     Ringing in ears     Scoliosis     Shortness of breath     O2 as needed    Sinus tachycardia 10/31/2013    Sleep apnea     CPAP \"pulmonary doctor took me off mid year 2016\"    Snoring     Tonsillitis     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HCC)     2/8/22: Per pt: not anymore    Tuberculosis     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    Urinary bladder disorder     Suprapubic cath. 2/8/22: Not anymore.     Urinary incontinence     Weakness     Wears glasses      ALLERGIES:   Allergies   Allergen Reactions    Depakote [Divalproex Sodium] Unspecified     Muscle spasms/muscle pain and weakness      Doxycycline Anaphylaxis and Vomiting     Other reaction(s): pustules/blisters    Montelukast [Singulair] Unspecified     Cardiac effusion    Vancomycin Itching     Pt becomes flushed in face and chest.   RXN=7/10/16    Amitriptyline Unspecified     Headaches      Aripiprazole [Abilify] Unspecified     Headaches/muscle twitching      Clindamycin Nausea           Flomax [Tamsulosin Hydrochloride] Swelling    Metformin Unspecified     Increased lactic acid     Other reaction(s): itching and rash/nausea vomiting    Tamsulosin Swelling     Swelling of legs    Tape Rash     Tears skin off  coban with Tegaderm tape ok intermittently  RXN=ongoing    Wound Dressing Adhesive Hives     By pt report " "   Ampicillin Rash     Pt reports that she received a rash     Ciprofloxacin Rash          Keflex Rash     Pt states she gets a rash with this medication  Tolerates ceftriaxone  Can take with Benadryl    Levofloxacin Unspecified     Leg muscle cramps    Metronidazole Rash     \"Vision problems\"    Penicillins Hives     Can take with Benadryl    Sulfa Drugs Itching and Myalgia     Muscle pain and weakness    Valproic Acid Rash     .     SURGHX:   Past Surgical History:   Procedure Laterality Date    [place] LAP,DIAGNOSTIC ABDOMEN  2/14/2022    Procedure: LAPAROSCOPY;  Surgeon: Seamus Pisano M.D.;  Location: SURGERY SAME DAY [facility];  Service: Gynecology    OVARIAN CYSTECTOMY Right 2/14/2022    Procedure: EXCISION, CYST, OVARY;  Surgeon: Seamus Pisano M.D.;  Location: SURGERY SAME DAY [facility];  Service: Gynecology    BOWEL STIMULATOR INSERTION  3/10/2021    Procedure: INSERTION, ELECTRODE LEADS AND PULSE GENERATOR, NEUROSTIMULATOR, SACRAL - REMOVAL OF INTERSTIM WITH REPLACEMENT OF SACRAL NEUROMODULATION DEVICE;  Surgeon: Joe Noyola M.D.;  Location: [facility];  Service: General    MUSCLE BIOPSY Right 1/26/2017    Procedure: MUSCLE BIOPSY - THIGH;  Surgeon: Isidro Vigil M.D.;  Location: [facility];  Service:     GASTROSCOPY WITH BALLOON DILATATION N/A 1/4/2017    Procedure: GASTROSCOPY WITH DILATATION;  Surgeon: Torres Vargas M.D.;  Location: [facility];  Service:     BOWEL STIMULATOR INSERTION  7/13/2016    Procedure: BOWEL STIMULATOR INSERTION FOR PERMANENT INTERSTIM SACRAL IMPLANT;  Surgeon: Joe Noyola M.D.;  Location: [facility];  Service:     RECOVERY  1/27/2016    Procedure: CATH LAB EP STUDY WITH SINUS NODE MODIFICATION ABHINAV;  [facility];  Location: SURGERY PRE-POST PROC UNIT [facility];  Service:     OTHER CARDIAC SURGERY  1/2016    cardiac ablation    NEURO DEST FACET L/S W/IG SNGL  4/21/2015    Performed by Reza Tabor at SURGERY " [facility]    LUMBAR FUSION ANTERIOR  8/21/2012    Performed by SUSIE SAWANT at SURGERY [facility]S    ALYSSA BY LAPAROSCOPY  8/29/2010    Performed by SHAYY JOHNS at SURGERY [facility]S    LAMINOTOMY      OTHER ABDOMINAL SURGERY      TONSILLECTOMY      tonsillectomy     SOCHX:   Social History     Socioeconomic History    Marital status: Single   Tobacco Use    Smoking status: Never    Smokeless tobacco: Never   Vaping Use    Vaping Use: Never used   Substance and Sexual Activity    Alcohol use: No     Alcohol/week: 0.0 oz    Drug use: Not Currently     Frequency: 7.0 times per week     Types: Marijuana    Sexual activity: Not Currently     Birth control/protection: Implant   Social History Narrative    ** Merged History Encounter **          FH:   Family History   Problem Relation Age of Onset    Hypertension Mother     Sleep Apnea Mother     Heart Disease Mother     Other Mother         hypothryod    Hypertension Maternal Uncle     Heart Disease Maternal Grandmother     Hypertension Maternal Grandmother     No Known Problems Sister     Other Sister         Narcolepsy;fibromyalsia;bone;nerve    Genitourinary () Problems Sister         endometriosis         Objective:   /66   Pulse 99   Temp 36.6 °C (97.9 °F) (Temporal)   Resp 16   Ht 1.651 m (5' 5\")   Wt 108 kg (238 lb 9.6 oz)   SpO2 98%   BMI 39.71 kg/m²     Physical Exam  Vitals and nursing note reviewed.   Constitutional:       General: She is not in acute distress.     Appearance: Normal appearance. She is not ill-appearing.   HENT:      Head: Normocephalic and atraumatic.      Right Ear: External ear normal.      Left Ear: External ear normal.      Nose: No congestion or rhinorrhea.      Mouth/Throat:      Mouth: Mucous membranes are moist.   Eyes:      Extraocular Movements: Extraocular movements intact.      Pupils: Pupils are equal, round, and reactive to light.   Cardiovascular:      Rate and Rhythm: Normal rate and " regular rhythm.      Pulses: Normal pulses.      Heart sounds: Normal heart sounds.   Pulmonary:      Effort: Pulmonary effort is normal.      Breath sounds: Normal breath sounds.   Abdominal:      General: Abdomen is flat. Bowel sounds are normal.      Palpations: Abdomen is soft.      Tenderness: There is abdominal tenderness. There is no right CVA tenderness or left CVA tenderness.          Comments: Positive for right posterior flank pain.   Musculoskeletal:         General: Normal range of motion.      Cervical back: Normal range of motion and neck supple.   Skin:     General: Skin is warm and dry.      Capillary Refill: Capillary refill takes less than 2 seconds.   Neurological:      General: No focal deficit present.      Mental Status: She is alert and oriented to person, place, and time. Mental status is at baseline.   Psychiatric:         Mood and Affect: Mood normal.         Behavior: Behavior normal.         Thought Content: Thought content normal.         Judgment: Judgment normal.     POCT urine: Normal  Assessment/Plan:   Assessment    1. Urinary retention        2. Difficulty urinating          Differential diagnoses discussed with patient.  Unfortunately, her symptoms do require have a higher level of care as I do not have any access to bladder scanner or urinary catheter.  Her urine in the clinic today was clear and this does suggest that antibiotics to treat pyelonephritis are working.  It is possible that she has developed either a stricture or obstructing urinary stone.  She was encouraged to seek further evaluation in emergency room.  She verbalizes agreement and understanding of plan of care.  Patient is safe to drive herself at this time.  Time spent evaluating this patient was at least 30 minutes and includes preparing for visit, counseling/education, exam and evaluation, obtaining history, independent interpretation and ordering labs/tests/procedures.     AVS handout given and reviewed with  patient. Pt educated on red flags and when to seek treatment back in ER or UC.

## 2022-08-31 ENCOUNTER — HOSPITAL ENCOUNTER (EMERGENCY)
Facility: MEDICAL CENTER | Age: 32
End: 2022-08-31
Attending: EMERGENCY MEDICINE
Payer: MEDICARE

## 2022-08-31 VITALS
HEIGHT: 63 IN | HEART RATE: 72 BPM | RESPIRATION RATE: 20 BRPM | SYSTOLIC BLOOD PRESSURE: 119 MMHG | DIASTOLIC BLOOD PRESSURE: 52 MMHG | WEIGHT: 240.3 LBS | TEMPERATURE: 98 F | BODY MASS INDEX: 42.58 KG/M2 | OXYGEN SATURATION: 96 %

## 2022-08-31 DIAGNOSIS — G43.809 OTHER MIGRAINE WITHOUT STATUS MIGRAINOSUS, NOT INTRACTABLE: ICD-10-CM

## 2022-08-31 DIAGNOSIS — H53.9 VISION CHANGES: ICD-10-CM

## 2022-08-31 PROCEDURE — 700105 HCHG RX REV CODE 258: Performed by: EMERGENCY MEDICINE

## 2022-08-31 PROCEDURE — 96375 TX/PRO/DX INJ NEW DRUG ADDON: CPT

## 2022-08-31 PROCEDURE — 96374 THER/PROPH/DIAG INJ IV PUSH: CPT

## 2022-08-31 PROCEDURE — 99284 EMERGENCY DEPT VISIT MOD MDM: CPT

## 2022-08-31 PROCEDURE — 700111 HCHG RX REV CODE 636 W/ 250 OVERRIDE (IP): Performed by: EMERGENCY MEDICINE

## 2022-08-31 RX ORDER — DOCUSATE SODIUM 250 MG
250 CAPSULE ORAL 2 TIMES DAILY
Status: ON HOLD | COMMUNITY
End: 2023-02-09

## 2022-08-31 RX ORDER — SUMATRIPTAN 50 MG/1
50 TABLET, FILM COATED ORAL
Qty: 20 TABLET | Refills: 0 | Status: SHIPPED | OUTPATIENT
Start: 2022-08-31 | End: 2022-12-10

## 2022-08-31 RX ORDER — KETOROLAC TROMETHAMINE 30 MG/ML
15 INJECTION, SOLUTION INTRAMUSCULAR; INTRAVENOUS ONCE
Status: COMPLETED | OUTPATIENT
Start: 2022-08-31 | End: 2022-08-31

## 2022-08-31 RX ORDER — LACTULOSE 20 G/30ML
30 SOLUTION ORAL 3 TIMES DAILY PRN
Status: SHIPPED | COMMUNITY
End: 2022-12-10

## 2022-08-31 RX ORDER — ACETAZOLAMIDE 500 MG/5ML
500 INJECTION, POWDER, LYOPHILIZED, FOR SOLUTION INTRAVENOUS ONCE
Status: COMPLETED | OUTPATIENT
Start: 2022-08-31 | End: 2022-08-31

## 2022-08-31 RX ORDER — SULFAMETHOXAZOLE AND TRIMETHOPRIM 800; 160 MG/1; MG/1
1 TABLET ORAL 2 TIMES DAILY
Status: SHIPPED | COMMUNITY
End: 2022-11-10

## 2022-08-31 RX ORDER — SODIUM CHLORIDE, SODIUM LACTATE, POTASSIUM CHLORIDE, CALCIUM CHLORIDE 600; 310; 30; 20 MG/100ML; MG/100ML; MG/100ML; MG/100ML
1000 INJECTION, SOLUTION INTRAVENOUS ONCE
Status: COMPLETED | OUTPATIENT
Start: 2022-08-31 | End: 2022-08-31

## 2022-08-31 RX ORDER — DIPHENHYDRAMINE HYDROCHLORIDE 50 MG/ML
50 INJECTION INTRAMUSCULAR; INTRAVENOUS ONCE
Status: COMPLETED | OUTPATIENT
Start: 2022-08-31 | End: 2022-08-31

## 2022-08-31 RX ORDER — HALOPERIDOL 5 MG/ML
5 INJECTION INTRAMUSCULAR ONCE
Status: COMPLETED | OUTPATIENT
Start: 2022-08-31 | End: 2022-08-31

## 2022-08-31 RX ADMIN — ACETAZOLAMIDE 500 MG: 500 INJECTION, POWDER, LYOPHILIZED, FOR SOLUTION INTRAVENOUS at 17:21

## 2022-08-31 RX ADMIN — KETOROLAC TROMETHAMINE 15 MG: 30 INJECTION, SOLUTION INTRAMUSCULAR; INTRAVENOUS at 16:18

## 2022-08-31 RX ADMIN — SODIUM CHLORIDE, POTASSIUM CHLORIDE, SODIUM LACTATE AND CALCIUM CHLORIDE 1000 ML: 600; 310; 30; 20 INJECTION, SOLUTION INTRAVENOUS at 16:17

## 2022-08-31 RX ADMIN — DIPHENHYDRAMINE HYDROCHLORIDE 50 MG: 50 INJECTION INTRAMUSCULAR; INTRAVENOUS at 16:18

## 2022-08-31 RX ADMIN — HALOPERIDOL LACTATE 5 MG: 5 INJECTION, SOLUTION INTRAMUSCULAR at 16:18

## 2022-08-31 ASSESSMENT — FIBROSIS 4 INDEX: FIB4 SCORE: 0.49

## 2022-08-31 NOTE — ED NOTES
Med rec updated and complete, per pt   Allergies reviewed, per pt   Interviewed pt with family at bedside with permission from pt.  Pt reports that she is not sure when she had her NEXPLANON  injected last,  pt reports she still has it in her arm

## 2022-08-31 NOTE — ED PROVIDER NOTES
"ED Provider Note    CHIEF COMPLAINT  Chief Complaint   Patient presents with    Migraine     Started 4 days ago, c/o weakness that started yesterday, nausea, loss of appetite, numbness in L arm and leg - pt states she has had this happen before w/ Prior migraines        HPI  Kristin Balderrama is a 31 y.o. female who presents to the ED complaining that she is having a hemiplegic event.  Patient states that she has complex migraines with hemiplegia and that she is starting have those symptoms.  Patient started with her headache 4 days ago but did not have any sumatriptan because she had run out of them and only had 9 of those.  The patient was using butalbital but they did not seem to be helping so then she started getting her left-sided weakness which is very typical for her migraine pattern.  Patient states that she does not know what really helps when she gets to this point.  The patient has been admitted to the hospital several times.  She denies any overt headache she states that typically gets to the point the headache is gone she does describe a dull achy pain.  Denies any fevers chills nausea vomiting or any other symptoms.  Patient presents for evaluation.    REVIEW OF SYSTEMS  See HPI for further details. All other systems are negative.     PAST MEDICAL HISTORY  Past Medical History:   Diagnosis Date    Abdominal pain     Anginal syndrome     random chest pain especially with tachycardia    Apnea, sleep     Arrhythmia     \"sinus tachycardia\", cariologist, Dr. Kumar; ablation 2/2016    Arthritis     osteo    ASTHMA     when around smoke    Back pain     Borderline personality disorder (HCC)     Breath shortness     with tachycardia    Bronchitis 02/08/2022    Last time was 12/21    Cardiac arrhythmia     Chickenpox     Chronic UTI 09/18/2010    Cough     Daytime sleepiness     Dental disorder 03/08/2021    infected tooth    Depression     Diabetes (HCC)     Diarrhea     incontinece    Disorder of thyroid  " "   Hashimoto's    Fall     Fatigue     Frequent headaches     Gasping for breath     Gynecological disorder     PCOS    Headache(784.0)     Heart burn     Heart murmur     History of falling     Indigestion     Migraine     Mitochondrial disease (HCC)     Multiple personality disorder (HCC)     Nausea     Obesity     Other fatigue 06/29/2020    Pain 08/15/2012    back, 7/10    Painful joint     PCOS (polycystic ovarian syndrome)     Pneumonia 2012 12/2020    Psychosis (HCC)     Ringing in ears     Scoliosis     Shortness of breath     O2 as needed    Sinus tachycardia 10/31/2013    Sleep apnea     CPAP \"pulmonary doctor took me off mid year 2016\"    Snoring     Tonsillitis     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HCC)     2/8/22: Per pt: not anymore    Tuberculosis     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    Urinary bladder disorder     Suprapubic cath. 2/8/22: Not anymore.     Urinary incontinence     Weakness     Wears glasses        FAMILY HISTORY  Family History   Problem Relation Age of Onset    Hypertension Mother     Sleep Apnea Mother     Heart Disease Mother     Other Mother         hypothryod    Hypertension Maternal Uncle     Heart Disease Maternal Grandmother     Hypertension Maternal Grandmother     No Known Problems Sister     Other Sister         Narcolepsy;fibromyalsia;bone;nerve    Genitourinary () Problems Sister         endometriosis     SOCIAL HISTORY  Social History     Socioeconomic History    Marital status: Single   Tobacco Use    Smoking status: Never    Smokeless tobacco: Never   Vaping Use    Vaping Use: Never used   Substance and Sexual Activity    Alcohol use: No     Alcohol/week: 0.0 oz    Drug use: Not Currently     Frequency: 7.0 times per week     Types: Marijuana    Sexual activity: Not Currently     Birth control/protection: Implant   Social History Narrative    ** Merged History Encounter **           Torres Brody M.D.      SURGICAL HISTORY  Past Surgical History:   Procedure " Laterality Date    GA LAP,DIAGNOSTIC ABDOMEN  2/14/2022    Procedure: LAPAROSCOPY;  Surgeon: Seamus Pisano M.D.;  Location: SURGERY SAME DAY [facility];  Service: Gynecology    OVARIAN CYSTECTOMY Right 2/14/2022    Procedure: EXCISION, CYST, OVARY;  Surgeon: Seamus Pisano M.D.;  Location: SURGERY SAME DAY [facility];  Service: Gynecology    BOWEL STIMULATOR INSERTION  3/10/2021    Procedure: INSERTION, ELECTRODE LEADS AND PULSE GENERATOR, NEUROSTIMULATOR, SACRAL - REMOVAL OF INTERSTIM WITH REPLACEMENT OF SACRAL NEUROMODULATION DEVICE;  Surgeon: Joe Noyola M.D.;  Location: SURGERY [facility];  Service: General    MUSCLE BIOPSY Right 1/26/2017    Procedure: MUSCLE BIOPSY - THIGH;  Surgeon: Isidro Vigil M.D.;  Location: SURGERY [facility];  Service:     GASTROSCOPY WITH BALLOON DILATATION N/A 1/4/2017    Procedure: GASTROSCOPY WITH DILATATION;  Surgeon: Torres Vargas M.D.;  Location: SURGERY [facility];  Service:     BOWEL STIMULATOR INSERTION  7/13/2016    Procedure: BOWEL STIMULATOR INSERTION FOR PERMANENT INTERSTIM SACRAL IMPLANT;  Surgeon: Joe Noyola M.D.;  Location: SURGERY [facility];  Service:     RECOVERY  1/27/2016    Procedure: CATH LAB EP STUDY WITH SINUS NODE MODIFICATION LA;  Surgeon: [facility];  Location: SURGERY PRE-POST PROC UNIT [facility];  Service:     OTHER CARDIAC SURGERY  1/2016    cardiac ablation    NEURO DEST FACET L/S W/IG SNGL  4/21/2015    Performed by Reza Tabor at [facility]    LUMBAR FUSION ANTERIOR  8/21/2012    Performed by SUSIE SAWANT at SURGERY [facility] ORS    ALYSSA BY LAPAROSCOPY  8/29/2010    Performed by SHAYY JOHNS at SURGERY [facility] ORS    LAMINOTOMY      OTHER ABDOMINAL SURGERY      TONSILLECTOMY      tonsillectomy       CURRENT MEDICATIONS  Home Medications       Reviewed by Ulisses Webster (Pharmacy Tech) on 08/31/22 at 1643  Med List Status: Complete     Medication Last Dose Status   albuterol 108  "(90 Base) MCG/ACT Aero Soln inhalation aerosol > 1 month Active   Aspirin-Acetaminophen-Caffeine (EXCEDRIN MIGRAINE PO) 8/30/2022 Active   butalbital/apap/caffeine (FIORICET) -40 mg Tab 8/31/2022 Active   docusate sodium (COLACE) 100 MG Cap > 1 month Active   etonogestrel (NEXPLANON) 68 MG Implant implant Unknown Active   ivabradine (CORLANOR) 7.5 MG Tab tablet 8/30/2022 Active   lactulose 20 GM/30ML Solution > 1 month Active   Melatonin 10 MG Tab 8/30/2022 Active   ondansetron (ZOFRAN ODT) 4 MG TABLET DISPERSIBLE 8/30/2022 Active   sulfamethoxazole-trimethoprim (BACTRIM DS) 800-160 MG tablet 8/21/2022 Active   SUMAtriptan (IMITREX) 50 MG Tab > 1 month Active   traZODone (DESYREL) 100 MG Tab 8/30/2022 Active   ziprasidone (GEODON) 40 MG Cap 8/30/2022 Active                    ALLERGIES  Allergies   Allergen Reactions    Depakote [Divalproex Sodium] Unspecified     Muscle spasms/muscle pain and weakness      Doxycycline Anaphylaxis and Vomiting     Other reaction(s): pustules/blisters    Montelukast [Singulair] Unspecified     Cardiac effusion    Vancomycin Itching     Pt becomes flushed in face and chest.   RXN=7/10/16    Amitriptyline Unspecified     Headaches      Aripiprazole [Abilify] Unspecified     Headaches/muscle twitching      Clindamycin Nausea           Flomax [Tamsulosin Hydrochloride] Swelling    Metformin Unspecified     Increased lactic acid     Other reaction(s): itching and rash/nausea vomiting    Tamsulosin Swelling     Swelling of legs    Tape Rash     Tears skin off  coban with Tegaderm tape ok intermittently  RXN=ongoing    Wound Dressing Adhesive Hives     By pt report    Ampicillin Rash     Pt reports that she received a rash     Ciprofloxacin Rash          Keflex Rash     Pt states she gets a rash with this medication  Tolerates ceftriaxone  Can take with Benadryl    Levofloxacin Unspecified     Leg muscle cramps    Metronidazole Rash     \"Vision problems\"    Penicillins Hives     Can " "take with Benadryl    Sulfa Drugs Itching and Myalgia     Muscle pain and weakness    Valproic Acid Rash     .       PHYSICAL EXAM  VITAL SIGNS: /89   Pulse 97   Temp 36.6 °C (97.8 °F) (Temporal)   Resp 18   Ht 1.6 m (5' 3\")   Wt 109 kg (240 lb 4.8 oz)   SpO2 96%   BMI 42.57 kg/m²   Pulse Oximetry was obtained. It showed a reading of Pulse Oximetry: 96 %.  I interpreted this as nonhypoxic.   Constitutional: Well developed, Well nourished, No acute distress, Non-toxic appearance.   HENT: Head is normal without signs of trauma bilateral TMs normal oropharynx is moist mucous membranes  Eyes: Pupils are 3 mm equal round react to light however the patient does have strabismus and does not track with her left eye she seems to be more right eye dominant.  Extraocular motions independently there are intact.  Neck: Normal range of motion, No tenderness, Supple, No stridor.   Cardiovascular: Normal heart rate, Normal rhythm, No murmurs, No rubs, No gallops. There are no carotid bruits.   Pulmonary: Normal breath sounds, No respiratory distress, No wheezing, No chest tenderness.   Abdomen: Bowel sounds normal, Soft, No tenderness, No masses, No pulsatile masses.   Skin: Warm, Dry, No erythema, No rash.   Back: No tenderness, No CVA tenderness.   Extremities: Intact distal pulses, No edema, No tenderness, No cyanosis, No clubbing.   Neurologic: Cranial nerves II through XII are grossly intact.  The patient has 2+ DTRs however she also has a positive Morris test with limited movement to the left leg.  When asked to push up she pushes down.  Same with her left arm she will lift that up to get to my hand but then when I asked her to lift it up she will push it down.  At this point right side is otherwise normal.  Psychiatric: Affect normal, Judgment normal, Mood normal.     EKG      RADIOLOGY/PROCEDURES      COURSE & MEDICAL DECISION MAKING  Pertinent Labs & Imaging studies reviewed. (See chart for details)  Patient " presents for evaluation.  The patient has been seen through the [facility] system 13 times since January alone.  Patient has had 5 CT scans of her head within the last 12 months and a total of 22 CT scans in the same 12-month period.  I did give the patient an IV dose of 5 mg Haldol, 50 mg of Benadryl and 15 mg of Toradol.  I started the patient with a bolus of lactated Ringer's.  Upon reevaluation the patient was starting to move her arm and leg and states that she is feeling much improved.  I do feel that there is a significant amount of conversion reaction.  At this point the patient is feeling improved we will monitor her and as long as she is improving I do feel the patient will be able to be discharged.  Again the patient's had multiple work-ups for this and has all been negative in the past I do feel at this time that the patient does not require any further imaging today.    FINAL IMPRESSION  1. Other 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, not intractable        2. Vision changes  SUMAtriptan (IMITREX) 50 MG Tab         The patient will return for new or worsening symptoms and is stable at the time of discharge.    The patient is referred to a primary physician for blood pressure management, diabetic screening, and for all other preventative health concerns.        DISPOSITION:  Patient will be discharged home in stable condition.    FOLLOW UP:  [facility]  [address] [place] [address]  [facility] [id number] [phone]  Schedule an appointment as soon as possible for a visit   For re-check, Return if any symptoms worsen      OUTPATIENT MEDICATIONS:  Current Discharge Medication List              Electronically signed by: Wesley Joseph M.D., 8/31/2022 3:56 PM

## 2022-08-31 NOTE — ED TRIAGE NOTES
"Chief Complaint   Patient presents with    Migraine     Started 4 days ago, c/o weakness that started yesterday, nausea, loss of appetite, numbness in L arm and leg - pt states she has had this happen before w/ Prior migraines     /89   Pulse 97   Temp 36.6 °C (97.8 °F) (Temporal)   Resp 18   Ht 1.6 m (5' 3\")   Wt 109 kg (240 lb 4.8 oz)   SpO2 96%   BMI 42.57 kg/m²     Pt arrived w/ above concern, pt has been taking RX migraine medicine at home w/o relief   "

## 2022-09-01 NOTE — ED NOTES
Pt started to scream about her arm burning 4mL into pushing   Diamox. Pt states that once line was removed pain improved. No redness or swelling  noted at this time. Per Pharmacy may offer ICE or Heat to arm for comfort. Pt declines need at this time. ERP aware.       Two attempts at IV access unsuccessful. Charge making an attempt at this time. ERP aware delay in medication administration due to lack of IV access at this time.

## 2022-09-02 ENCOUNTER — TELEPHONE (OUTPATIENT)
Dept: CARDIOLOGY | Facility: MEDICAL CENTER | Age: 32
End: 2022-09-02
Payer: MEDICARE

## 2022-09-07 PROCEDURE — 93228 REMOTE 30 DAY ECG REV/REPORT: CPT | Performed by: INTERNAL MEDICINE

## 2022-09-12 ENCOUNTER — OFFICE VISIT (OUTPATIENT)
Dept: URGENT CARE | Facility: CLINIC | Age: 32
End: 2022-09-12
Payer: MEDICARE

## 2022-09-12 VITALS
TEMPERATURE: 98.2 F | SYSTOLIC BLOOD PRESSURE: 130 MMHG | RESPIRATION RATE: 20 BRPM | BODY MASS INDEX: 42.52 KG/M2 | WEIGHT: 240 LBS | HEART RATE: 100 BPM | DIASTOLIC BLOOD PRESSURE: 82 MMHG | HEIGHT: 63 IN | OXYGEN SATURATION: 99 %

## 2022-09-12 DIAGNOSIS — J45.21 EXACERBATION OF INTERMITTENT ASTHMA, UNSPECIFIED ASTHMA SEVERITY: ICD-10-CM

## 2022-09-12 PROCEDURE — 94640 AIRWAY INHALATION TREATMENT: CPT | Performed by: STUDENT IN AN ORGANIZED HEALTH CARE EDUCATION/TRAINING PROGRAM

## 2022-09-12 PROCEDURE — 99214 OFFICE O/P EST MOD 30 MIN: CPT | Mod: 25 | Performed by: STUDENT IN AN ORGANIZED HEALTH CARE EDUCATION/TRAINING PROGRAM

## 2022-09-12 RX ORDER — IPRATROPIUM BROMIDE AND ALBUTEROL SULFATE 2.5; .5 MG/3ML; MG/3ML
3 SOLUTION RESPIRATORY (INHALATION) ONCE
Status: COMPLETED | OUTPATIENT
Start: 2022-09-12 | End: 2022-09-12

## 2022-09-12 RX ORDER — DEXAMETHASONE SODIUM PHOSPHATE 10 MG/ML
6 INJECTION INTRAMUSCULAR; INTRAVENOUS ONCE
Status: COMPLETED | OUTPATIENT
Start: 2022-09-12 | End: 2022-09-12

## 2022-09-12 RX ORDER — PREDNISONE 20 MG/1
40 TABLET ORAL DAILY
Qty: 8 TABLET | Refills: 0 | Status: SHIPPED | OUTPATIENT
Start: 2022-09-12 | End: 2022-09-16

## 2022-09-12 RX ADMIN — IPRATROPIUM BROMIDE AND ALBUTEROL SULFATE 3 ML: 2.5; .5 SOLUTION RESPIRATORY (INHALATION) at 10:56

## 2022-09-12 RX ADMIN — DEXAMETHASONE SODIUM PHOSPHATE 6 MG: 10 INJECTION INTRAMUSCULAR; INTRAVENOUS at 11:20

## 2022-09-12 ASSESSMENT — FIBROSIS 4 INDEX: FIB4 SCORE: 0.49
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
## 2022-09-13 ENCOUNTER — TELEPHONE (OUTPATIENT)
Dept: SLEEP MEDICINE | Facility: MEDICAL CENTER | Age: 32
End: 2022-09-13
Payer: MEDICARE

## 2022-09-13 NOTE — TELEPHONE ENCOUNTER
9-13-22  1st NO SHOW  Date of No Show: 9-12-22  Provider: Chana  Reason For Visit: FV/ Sleep   Outcome of call:    Appt rescheduled with patient   Reason Missed: pt forgot about appt  ACM

## 2022-09-14 ENCOUNTER — HOSPITAL ENCOUNTER (EMERGENCY)
Facility: MEDICAL CENTER | Age: 32
End: 2022-09-14
Attending: EMERGENCY MEDICINE
Payer: MEDICARE

## 2022-09-14 VITALS
BODY MASS INDEX: 43.4 KG/M2 | WEIGHT: 244.93 LBS | HEIGHT: 63 IN | HEART RATE: 82 BPM | OXYGEN SATURATION: 94 % | RESPIRATION RATE: 18 BRPM | DIASTOLIC BLOOD PRESSURE: 90 MMHG | TEMPERATURE: 97.3 F | SYSTOLIC BLOOD PRESSURE: 135 MMHG

## 2022-09-14 DIAGNOSIS — J45.21 EXACERBATION OF INTERMITTENT ASTHMA, UNSPECIFIED ASTHMA SEVERITY: ICD-10-CM

## 2022-09-14 LAB
EKG IMPRESSION: NORMAL
FLUAV RNA SPEC QL NAA+PROBE: NEGATIVE
FLUBV RNA SPEC QL NAA+PROBE: NEGATIVE
RSV RNA SPEC QL NAA+PROBE: NEGATIVE
SARS-COV-2 RNA RESP QL NAA+PROBE: NOTDETECTED
SPECIMEN SOURCE: NORMAL

## 2022-09-14 PROCEDURE — C9803 HOPD COVID-19 SPEC COLLECT: HCPCS | Performed by: EMERGENCY MEDICINE

## 2022-09-14 PROCEDURE — 99284 EMERGENCY DEPT VISIT MOD MDM: CPT

## 2022-09-14 PROCEDURE — 700105 HCHG RX REV CODE 258: Performed by: EMERGENCY MEDICINE

## 2022-09-14 PROCEDURE — 700101 HCHG RX REV CODE 250: Performed by: EMERGENCY MEDICINE

## 2022-09-14 PROCEDURE — 0241U HCHG SARS-COV-2 COVID-19 NFCT DS RESP RNA 4 TRGT MIC: CPT

## 2022-09-14 PROCEDURE — 93005 ELECTROCARDIOGRAM TRACING: CPT | Performed by: EMERGENCY MEDICINE

## 2022-09-14 PROCEDURE — 96374 THER/PROPH/DIAG INJ IV PUSH: CPT

## 2022-09-14 PROCEDURE — 93005 ELECTROCARDIOGRAM TRACING: CPT

## 2022-09-14 PROCEDURE — 94640 AIRWAY INHALATION TREATMENT: CPT

## 2022-09-14 PROCEDURE — 700111 HCHG RX REV CODE 636 W/ 250 OVERRIDE (IP): Performed by: EMERGENCY MEDICINE

## 2022-09-14 RX ORDER — IPRATROPIUM BROMIDE AND ALBUTEROL SULFATE 2.5; .5 MG/3ML; MG/3ML
3 SOLUTION RESPIRATORY (INHALATION)
Status: COMPLETED | OUTPATIENT
Start: 2022-09-14 | End: 2022-09-14

## 2022-09-14 RX ORDER — SODIUM CHLORIDE 9 MG/ML
1000 INJECTION, SOLUTION INTRAVENOUS ONCE
Status: COMPLETED | OUTPATIENT
Start: 2022-09-14 | End: 2022-09-14

## 2022-09-14 RX ORDER — PREDNISONE 20 MG/1
60 TABLET ORAL DAILY
Qty: 15 TABLET | Refills: 0 | Status: SHIPPED | OUTPATIENT
Start: 2022-09-14 | End: 2022-09-19

## 2022-09-14 RX ORDER — METHYLPREDNISOLONE SODIUM SUCCINATE 125 MG/2ML
125 INJECTION, POWDER, LYOPHILIZED, FOR SOLUTION INTRAMUSCULAR; INTRAVENOUS ONCE
Status: COMPLETED | OUTPATIENT
Start: 2022-09-14 | End: 2022-09-14

## 2022-09-14 RX ADMIN — SODIUM CHLORIDE 1000 ML: 9 INJECTION, SOLUTION INTRAVENOUS at 09:15

## 2022-09-14 RX ADMIN — IPRATROPIUM BROMIDE AND ALBUTEROL SULFATE 3 ML: .5; 2.5 SOLUTION RESPIRATORY (INHALATION) at 10:43

## 2022-09-14 RX ADMIN — METHYLPREDNISOLONE SODIUM SUCCINATE 125 MG: 125 INJECTION, POWDER, FOR SOLUTION INTRAMUSCULAR; INTRAVENOUS at 09:15

## 2022-09-14 ASSESSMENT — FIBROSIS 4 INDEX: FIB4 SCORE: 0.49
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
## 2022-09-15 ENCOUNTER — PATIENT MESSAGE (OUTPATIENT)
Dept: CARDIOLOGY | Facility: MEDICAL CENTER | Age: 32
End: 2022-09-15
Payer: MEDICARE

## 2022-09-15 NOTE — LETTER
"September 23, 2022        Kristin Balderrama  [address] [place] [address]          Dear Kristin,    We received notification that the [employer] message we initially sent you was not reviewed 9/15/22.    We have received the results of your recent: Cardiac Event Monitor    Your test came back and Dr. Leon advised your \"heart monitor is normal.\"  Please follow up as previously discussed with your physician.      Feel free to call us with any questions.        Sincerely,          Blanca CROSS  Electronically Signed  "

## 2022-09-15 NOTE — TELEPHONE ENCOUNTER
----- Message from John Leon M.D. sent at 9/12/2022 11:53 AM PDT -----  Please notify Kristin that her heart monitor is normal

## 2022-09-27 ENCOUNTER — APPOINTMENT (OUTPATIENT)
Dept: SLEEP MEDICINE | Facility: MEDICAL CENTER | Age: 32
End: 2022-09-27
Payer: MEDICARE

## 2022-10-03 ENCOUNTER — HOSPITAL ENCOUNTER (EMERGENCY)
Facility: MEDICAL CENTER | Age: 32
End: 2022-10-03
Attending: EMERGENCY MEDICINE
Payer: MEDICARE

## 2022-10-03 ENCOUNTER — APPOINTMENT (OUTPATIENT)
Dept: RADIOLOGY | Facility: MEDICAL CENTER | Age: 32
End: 2022-10-03
Attending: EMERGENCY MEDICINE
Payer: MEDICARE

## 2022-10-03 VITALS
HEIGHT: 63 IN | HEART RATE: 85 BPM | TEMPERATURE: 97.9 F | RESPIRATION RATE: 16 BRPM | SYSTOLIC BLOOD PRESSURE: 132 MMHG | BODY MASS INDEX: 43.52 KG/M2 | OXYGEN SATURATION: 97 % | WEIGHT: 245.59 LBS | DIASTOLIC BLOOD PRESSURE: 87 MMHG

## 2022-10-03 DIAGNOSIS — R10.9 FLANK PAIN: ICD-10-CM

## 2022-10-03 LAB
ALBUMIN SERPL BCP-MCNC: 4.8 G/DL (ref 3.2–4.9)
ALBUMIN/GLOB SERPL: 2.5 G/DL
ALP SERPL-CCNC: 65 U/L (ref 30–99)
ALT SERPL-CCNC: 19 U/L (ref 2–50)
ANION GAP SERPL CALC-SCNC: 12 MMOL/L (ref 7–16)
APPEARANCE UR: CLEAR
AST SERPL-CCNC: 15 U/L (ref 12–45)
BACTERIA #/AREA URNS HPF: NEGATIVE /HPF
BASOPHILS # BLD AUTO: 0.4 % (ref 0–1.8)
BASOPHILS # BLD: 0.03 K/UL (ref 0–0.12)
BILIRUB SERPL-MCNC: 0.4 MG/DL (ref 0.1–1.5)
BILIRUB UR QL STRIP.AUTO: NEGATIVE
BUN SERPL-MCNC: 13 MG/DL (ref 8–22)
CALCIUM SERPL-MCNC: 9.4 MG/DL (ref 8.5–10.5)
CHLORIDE SERPL-SCNC: 111 MMOL/L (ref 96–112)
CO2 SERPL-SCNC: 16 MMOL/L (ref 20–33)
COLOR UR: YELLOW
CREAT SERPL-MCNC: 0.66 MG/DL (ref 0.5–1.4)
EOSINOPHIL # BLD AUTO: 0.1 K/UL (ref 0–0.51)
EOSINOPHIL NFR BLD: 1.5 % (ref 0–6.9)
EPI CELLS #/AREA URNS HPF: ABNORMAL /HPF
ERYTHROCYTE [DISTWIDTH] IN BLOOD BY AUTOMATED COUNT: 41.2 FL (ref 35.9–50)
GFR SERPLBLD CREATININE-BSD FMLA CKD-EPI: 119 ML/MIN/1.73 M 2
GLOBULIN SER CALC-MCNC: 1.9 G/DL (ref 1.9–3.5)
GLUCOSE SERPL-MCNC: 154 MG/DL (ref 65–99)
GLUCOSE UR STRIP.AUTO-MCNC: NEGATIVE MG/DL
HCG SERPL QL: NEGATIVE
HCT VFR BLD AUTO: 40.9 % (ref 37–47)
HGB BLD-MCNC: 14.1 G/DL (ref 12–16)
HYALINE CASTS #/AREA URNS LPF: ABNORMAL /LPF
IMM GRANULOCYTES # BLD AUTO: 0.03 K/UL (ref 0–0.11)
IMM GRANULOCYTES NFR BLD AUTO: 0.4 % (ref 0–0.9)
KETONES UR STRIP.AUTO-MCNC: NEGATIVE MG/DL
LEUKOCYTE ESTERASE UR QL STRIP.AUTO: ABNORMAL
LIPASE SERPL-CCNC: 45 U/L (ref 11–82)
LYMPHOCYTES # BLD AUTO: 1.94 K/UL (ref 1–4.8)
LYMPHOCYTES NFR BLD: 28.5 % (ref 22–41)
MCH RBC QN AUTO: 31.1 PG (ref 27–33)
MCHC RBC AUTO-ENTMCNC: 34.5 G/DL (ref 33.6–35)
MCV RBC AUTO: 90.3 FL (ref 81.4–97.8)
MICRO URNS: ABNORMAL
MONOCYTES # BLD AUTO: 0.34 K/UL (ref 0–0.85)
MONOCYTES NFR BLD AUTO: 5 % (ref 0–13.4)
NEUTROPHILS # BLD AUTO: 4.37 K/UL (ref 2–7.15)
NEUTROPHILS NFR BLD: 64.2 % (ref 44–72)
NITRITE UR QL STRIP.AUTO: NEGATIVE
NRBC # BLD AUTO: 0 K/UL
NRBC BLD-RTO: 0 /100 WBC
PH UR STRIP.AUTO: 5.5 [PH] (ref 5–8)
PLATELET # BLD AUTO: 181 K/UL (ref 164–446)
PMV BLD AUTO: 10.7 FL (ref 9–12.9)
POTASSIUM SERPL-SCNC: 3.6 MMOL/L (ref 3.6–5.5)
PROT SERPL-MCNC: 6.7 G/DL (ref 6–8.2)
PROT UR QL STRIP: NEGATIVE MG/DL
RBC # BLD AUTO: 4.53 M/UL (ref 4.2–5.4)
RBC # URNS HPF: ABNORMAL /HPF
RBC UR QL AUTO: NEGATIVE
SODIUM SERPL-SCNC: 139 MMOL/L (ref 135–145)
SP GR UR STRIP.AUTO: 1.02
UROBILINOGEN UR STRIP.AUTO-MCNC: 0.2 MG/DL
WBC # BLD AUTO: 6.8 K/UL (ref 4.8–10.8)
WBC #/AREA URNS HPF: ABNORMAL /HPF

## 2022-10-03 PROCEDURE — 85025 COMPLETE CBC W/AUTO DIFF WBC: CPT

## 2022-10-03 PROCEDURE — 83690 ASSAY OF LIPASE: CPT

## 2022-10-03 PROCEDURE — 700111 HCHG RX REV CODE 636 W/ 250 OVERRIDE (IP): Performed by: EMERGENCY MEDICINE

## 2022-10-03 PROCEDURE — 74176 CT ABD & PELVIS W/O CONTRAST: CPT

## 2022-10-03 PROCEDURE — 99284 EMERGENCY DEPT VISIT MOD MDM: CPT

## 2022-10-03 PROCEDURE — 80053 COMPREHEN METABOLIC PANEL: CPT

## 2022-10-03 PROCEDURE — 81001 URINALYSIS AUTO W/SCOPE: CPT

## 2022-10-03 PROCEDURE — 36415 COLL VENOUS BLD VENIPUNCTURE: CPT

## 2022-10-03 PROCEDURE — 84703 CHORIONIC GONADOTROPIN ASSAY: CPT

## 2022-10-03 PROCEDURE — 96372 THER/PROPH/DIAG INJ SC/IM: CPT

## 2022-10-03 RX ORDER — MORPHINE SULFATE 4 MG/ML
4 INJECTION INTRAVENOUS ONCE
Status: DISCONTINUED | OUTPATIENT
Start: 2022-10-03 | End: 2022-10-03

## 2022-10-03 RX ORDER — NAPROXEN 500 MG/1
500 TABLET ORAL 2 TIMES DAILY WITH MEALS
Qty: 15 TABLET | Refills: 0 | Status: SHIPPED | OUTPATIENT
Start: 2022-10-03 | End: 2022-11-10

## 2022-10-03 RX ORDER — ONDANSETRON 2 MG/ML
4 INJECTION INTRAMUSCULAR; INTRAVENOUS ONCE
Status: DISCONTINUED | OUTPATIENT
Start: 2022-10-03 | End: 2022-10-03

## 2022-10-03 RX ORDER — MORPHINE SULFATE 4 MG/ML
4 INJECTION INTRAVENOUS ONCE
Status: COMPLETED | OUTPATIENT
Start: 2022-10-03 | End: 2022-10-03

## 2022-10-03 RX ORDER — KETOROLAC TROMETHAMINE 30 MG/ML
15 INJECTION, SOLUTION INTRAMUSCULAR; INTRAVENOUS ONCE
Status: COMPLETED | OUTPATIENT
Start: 2022-10-03 | End: 2022-10-03

## 2022-10-03 RX ORDER — ONDANSETRON 4 MG/1
4 TABLET, ORALLY DISINTEGRATING ORAL ONCE
Status: COMPLETED | OUTPATIENT
Start: 2022-10-03 | End: 2022-10-03

## 2022-10-03 RX ADMIN — MORPHINE SULFATE 4 MG: 4 INJECTION INTRAVENOUS at 21:25

## 2022-10-03 RX ADMIN — KETOROLAC TROMETHAMINE 15 MG: 30 INJECTION, SOLUTION INTRAMUSCULAR; INTRAVENOUS at 19:35

## 2022-10-03 RX ADMIN — ONDANSETRON 4 MG: 4 TABLET, ORALLY DISINTEGRATING ORAL at 21:26

## 2022-10-03 ASSESSMENT — FIBROSIS 4 INDEX: FIB4 SCORE: 0.49

## 2022-10-04 ENCOUNTER — APPOINTMENT (OUTPATIENT)
Dept: RADIOLOGY | Facility: MEDICAL CENTER | Age: 32
End: 2022-10-04
Attending: EMERGENCY MEDICINE
Payer: MEDICARE

## 2022-10-04 ENCOUNTER — HOSPITAL ENCOUNTER (EMERGENCY)
Facility: MEDICAL CENTER | Age: 32
End: 2022-10-04
Attending: EMERGENCY MEDICINE
Payer: MEDICARE

## 2022-10-04 VITALS
HEART RATE: 84 BPM | TEMPERATURE: 97.8 F | OXYGEN SATURATION: 94 % | DIASTOLIC BLOOD PRESSURE: 65 MMHG | BODY MASS INDEX: 43.41 KG/M2 | RESPIRATION RATE: 16 BRPM | WEIGHT: 245 LBS | HEIGHT: 63 IN | SYSTOLIC BLOOD PRESSURE: 112 MMHG

## 2022-10-04 DIAGNOSIS — R10.30 LOWER ABDOMINAL PAIN: ICD-10-CM

## 2022-10-04 PROCEDURE — A9270 NON-COVERED ITEM OR SERVICE: HCPCS | Performed by: EMERGENCY MEDICINE

## 2022-10-04 PROCEDURE — 76856 US EXAM PELVIC COMPLETE: CPT

## 2022-10-04 PROCEDURE — 700102 HCHG RX REV CODE 250 W/ 637 OVERRIDE(OP): Performed by: EMERGENCY MEDICINE

## 2022-10-04 PROCEDURE — 99284 EMERGENCY DEPT VISIT MOD MDM: CPT

## 2022-10-04 RX ORDER — ACETAMINOPHEN 500 MG
1000 TABLET ORAL ONCE
Status: COMPLETED | OUTPATIENT
Start: 2022-10-04 | End: 2022-10-04

## 2022-10-04 RX ADMIN — ACETAMINOPHEN 1000 MG: 500 TABLET ORAL at 04:18

## 2022-10-04 RX ADMIN — IBUPROFEN 800 MG: 600 TABLET ORAL at 04:18

## 2022-10-04 ASSESSMENT — FIBROSIS 4 INDEX: FIB4 SCORE: 0.61

## 2022-10-04 NOTE — ED PROVIDER NOTES
ED Provider Note    Scribed for Abelardo Knight by Sun Bourne. 10/4/2022  3:29 AM    Primary care provider: Torres Brody M.D.  Means of arrival: walkin  History obtained from: Patient  History limited by: None    CHIEF COMPLAINT  Chief Complaint   Patient presents with    Abdominal Pain     Arrives with c/o RLQ abd pain   Pt states she was seen here earlier and discharged with instructions if the pain worsens to come back  Initially pain was located more near the RUQ and has moved to the RLQ, also developed nausea and chills  States she was unable to  discharge medications and is unable to sleep due to the pain       HPI  Kristin Balderrama is a 31 y.o. female who presents to the Emergency Department for right lower quadrant abdominal pain onset a few hours ago. She was seen for right upper quadrant pain earlier today and was discharged with return precautions. Upon getting home, pain started to radiate to right lower quadrant, with associated fever, nausea, and chills. She is worried that the stones have traveled and that she may have appendicitis. She was supposed to have picked up medications from her earlier discharge, but was unable to and discomfort is not allowing her to sleep. No diarrhea, constipation, dysuria, or cough. Patient does not have menstrual cycles secondary to nexplanon. History of PCOS, kidney stones, and diabetes.    Quality:radiating pain  Duration: one day  Severity: worsening pain  Associated sx: inability to sleep, nausea, chills, fever    REVIEW OF SYSTEMS  As above, all other systems reviewed and are negative.   See HPI for further details.     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    SURGICAL HISTORY   has a past surgical history that includes neuro dest facet l/s w/ig sngl (4/21/2015); recovery (1/27/2016); del[place] by laparoscopy (8/29/2010); lumbar fusion anterior (8/21/2012); other cardiac surgery (1/2016); tonsillectomy; bowel stimulator insertion (7/13/2016); gastroscopy with balloon dilatation (N/A, 1/4/2017); muscle biopsy (Right, 1/26/2017); other abdominal surgery; laminotomy; bowel stimulator insertion (3/10/2021); lap,diagnostic abdomen (2/14/2022); and ovarian cystectomy (Right, 2/14/2022).    SOCIAL HISTORY  Social History     Tobacco Use    Smoking status: Never    Smokeless tobacco: Never   Vaping Use    Vaping Use: Never used   Substance Use Topics    Alcohol use: No     Alcohol/week: 0.0 oz    Drug use: Not Currently     Frequency: 7.0 times per week     Types: Marijuana      Social History     Substance and Sexual Activity   Drug Use Not Currently    Frequency: 7.0 times per week    Types: Marijuana     FAMILY HISTORY  Family History   Problem Relation Age of Onset    Hypertension Mother     Sleep Apnea Mother     Heart Disease Mother     Other Mother         hypothryod    Hypertension Maternal Uncle     Heart Disease Maternal Grandmother     Hypertension Maternal Grandmother     No Known  Problems Sister     Other Sister         Narcolepsy;fibromyalsia;bone;nerve    Genitourinary () Problems Sister         endometriosis     CURRENT MEDICATIONS  Home Medications       Reviewed by Braxton Herbert R.N. (Registered Nurse) on 10/04/22 at 0232  Med List Status: Not Addressed     Medication Last Dose Status   albuterol 108 (90 Base) MCG/ACT Aero Soln inhalation aerosol  Active   Aspirin-Acetaminophen-Caffeine (EXCEDRIN MIGRAINE PO)  Active   docusate sodium (COLACE) 100 MG Cap  Active   etonogestrel (NEXPLANON) 68 MG Implant implant  Active   ivabradine (CORLANOR) 7.5 MG Tab tablet  Active   lactulose 20 GM/30ML Solution  Active   Melatonin 10 MG Tab  Active   naproxen (NAPROSYN) 500 MG Tab  Active   ondansetron (ZOFRAN ODT) 4 MG TABLET DISPERSIBLE  Active   sulfamethoxazole-trimethoprim (BACTRIM DS) 800-160 MG tablet  Active   SUMAtriptan (IMITREX) 50 MG Tab  Active   traZODone (DESYREL) 100 MG Tab  Active   ziprasidone (GEODON) 40 MG Cap  Active                  ALLERGIES  Allergies   Allergen Reactions    Depakote [Divalproex Sodium] Unspecified     Muscle spasms/muscle pain and weakness      Doxycycline Anaphylaxis and Vomiting     Other reaction(s): pustules/blisters    Montelukast [Singulair] Unspecified     Cardiac effusion    Vancomycin Itching     Pt becomes flushed in face and chest.   RXN=7/10/16    Amitriptyline Unspecified     Headaches      Aripiprazole [Abilify] Unspecified     Headaches/muscle twitching      Clindamycin Nausea           Flomax [Tamsulosin Hydrochloride] Swelling    Metformin Unspecified     Increased lactic acid     Other reaction(s): itching and rash/nausea vomiting    Tamsulosin Swelling     Swelling of legs    Tape Rash     Tears skin off  coban with Tegaderm tape ok intermittently  RXN=ongoing    Wound Dressing Adhesive Hives     By pt report    Ampicillin Rash     Pt reports that she received a rash     Ciprofloxacin Rash          Keflex Rash     Pt states she gets a  "rash with this medication  Tolerates ceftriaxone  Can take with Benadryl    Levofloxacin Unspecified     Leg muscle cramps    Metronidazole Rash     \"Vision problems\"    Penicillins Hives     Can take with Benadryl    Sulfa Drugs Itching and Myalgia     Muscle pain and weakness    Valproic Acid Rash     .       PHYSICAL EXAM    VITAL SIGNS:   Vitals:    10/04/22 0225 10/04/22 0229 10/04/22 0322 10/04/22 0439   BP: (!) 150/86  131/62 112/65   Pulse: 88      Resp: 18      Temp: 36.6 °C (97.9 °F)      TempSrc: Temporal      SpO2: 98%      Weight:  111 kg (245 lb)     Height:  1.6 m (5' 3\")       Vitals: My interpretation: Borderline hypertensive, not tachycardic, afebrile, not hypoxic    Reinterpretation of vitals: Improved    PE:   Constitutional: Well developed, Well nourished, No acute distress, Non-toxic appearance.   HENT: Normocephalic, Atraumatic, Bilateral external ears normal, Oropharynx is clear mucous membranes are moist. No oral exudates or nasal discharge.   Eyes: Pupils are equal round and reactive, EOMI, Conjunctiva normal, No discharge.   Neck: Normal range of motion, No tenderness, Supple, No stridor. No meningismus.  Lymphatic: No lymphadenopathy noted.   Cardiovascular: Regular rate and rhythm without murmur rub or gallop.  Thorax & Lungs: Clear breath sounds bilaterally without wheezes, rhonchi or rales. There is no chest wall tenderness.   Abdomen: Obese, soft non-distended. There is no rebound or guarding. No organomegaly is appreciated. Bowel sounds are normal.  Skin: Normal without rash.   Back: No CVA or spinal tenderness.   Extremities: Intact distal pulses, No edema, No tenderness, No cyanosis, No clubbing. Capillary refill is less than 2 seconds.  Musculoskeletal: Good range of motion in all major joints. No tenderness to palpation or major deformities noted.   Neurologic: Alert & oriented x 3, Normal motor function, Normal sensory function, No focal deficits noted. Reflexes are " normal.  Psychiatric: Affect normal, Judgment normal, Mood normal. There is no suicidal ideation or patient reported hallucinations.     DIAGNOSTIC STUDIES / PROCEDURES    LABS  Results for orders placed or performed during the hospital encounter of 10/03/22   CBC WITH DIFFERENTIAL   Result Value Ref Range    WBC 6.8 4.8 - 10.8 K/uL    RBC 4.53 4.20 - 5.40 M/uL    Hemoglobin 14.1 12.0 - 16.0 g/dL    Hematocrit 40.9 37.0 - 47.0 %    MCV 90.3 81.4 - 97.8 fL    MCH 31.1 27.0 - 33.0 pg    MCHC 34.5 33.6 - 35.0 g/dL    RDW 41.2 35.9 - 50.0 fL    Platelet Count 181 164 - 446 K/uL    MPV 10.7 9.0 - 12.9 fL    Neutrophils-Polys 64.20 44.00 - 72.00 %    Lymphocytes 28.50 22.00 - 41.00 %    Monocytes 5.00 0.00 - 13.40 %    Eosinophils 1.50 0.00 - 6.90 %    Basophils 0.40 0.00 - 1.80 %    Immature Granulocytes 0.40 0.00 - 0.90 %    Nucleated RBC 0.00 /100 WBC    Neutrophils (Absolute) 4.37 2.00 - 7.15 K/uL    Lymphs (Absolute) 1.94 1.00 - 4.80 K/uL    Monos (Absolute) 0.34 0.00 - 0.85 K/uL    Eos (Absolute) 0.10 0.00 - 0.51 K/uL    Baso (Absolute) 0.03 0.00 - 0.12 K/uL    Immature Granulocytes (abs) 0.03 0.00 - 0.11 K/uL    NRBC (Absolute) 0.00 K/uL   COMP METABOLIC PANEL   Result Value Ref Range    Sodium 139 135 - 145 mmol/L    Potassium 3.6 3.6 - 5.5 mmol/L    Chloride 111 96 - 112 mmol/L    Co2 16 (L) 20 - 33 mmol/L    Anion Gap 12.0 7.0 - 16.0    Glucose 154 (H) 65 - 99 mg/dL    Bun 13 8 - 22 mg/dL    Creatinine 0.66 0.50 - 1.40 mg/dL    Calcium 9.4 8.5 - 10.5 mg/dL    AST(SGOT) 15 12 - 45 U/L    ALT(SGPT) 19 2 - 50 U/L    Alkaline Phosphatase 65 30 - 99 U/L    Total Bilirubin 0.4 0.1 - 1.5 mg/dL    Albumin 4.8 3.2 - 4.9 g/dL    Total Protein 6.7 6.0 - 8.2 g/dL    Globulin 1.9 1.9 - 3.5 g/dL    A-G Ratio 2.5 g/dL   LIPASE   Result Value Ref Range    Lipase 45 11 - 82 U/L   HCG QUAL SERUM   Result Value Ref Range    Beta-Hcg Qualitative Serum Negative Negative   URINALYSIS,CULTURE IF INDICATED    Specimen: Urine   Result  Value Ref Range    Color Yellow     Character Clear     Specific Gravity 1.022 <1.035    Ph 5.5 5.0 - 8.0    Glucose Negative Negative mg/dL    Ketones Negative Negative mg/dL    Protein Negative Negative mg/dL    Bilirubin Negative Negative    Urobilinogen, Urine 0.2 Negative    Nitrite Negative Negative    Leukocyte Esterase Small (A) Negative    Occult Blood Negative Negative    Micro Urine Req Microscopic    ESTIMATED GFR   Result Value Ref Range    GFR (CKD-EPI) 119 >60 mL/min/1.73 m 2   URINE MICROSCOPIC (W/UA)   Result Value Ref Range    WBC 5-10 (A) /hpf    RBC 2-5 (A) /hpf    Bacteria Negative None /hpf    Epithelial Cells Moderate (A) /hpf    Hyaline Cast 0-2 /lpf     *Note: Due to a large number of results and/or encounters for the requested time period, some results have not been displayed. A complete set of results can be found in Results Review.      All labs reviewed by me. Significant for no leukocytosis, no anemia, normal electrolytes, bicarb of 16 showing some mild dehydration, normal renal function, normal liver enzymes, normal bilirubin, lipase normal, pregnancy negative, urinalysis negative for ketones, infection or blood    RADIOLOGY  US-PELVIC COMPLETE (TRANSABDOMINAL/TRANSVAGINAL) (COMBO)   Final Result         1.  Normal transvaginal appearance of the pelvis.          IMPRESSION:        1.  Bilateral nephrolithiasis without visualized obstructive changes.  2.  Hyperdensity in the appendix, could represent residual contrast from prior radiographic examination appendicolith, otherwise the appendix is unremarkable  3.  Small fat-containing right inguinal hernia  4.  Hepatomegaly           Exam Ended: 10/03/22 20:58 Last Resulted: 10/03/22 21:41           The radiologist's interpretation of all radiological studies have been reviewed by me.    COURSE & MEDICAL DECISION MAKING  Nursing notes, VS, PMSFHx, labs, imaging, EKG reviewed in chart.    MDM: 3:29 AM Kristin Balderrama is a 31 y.o.  female who presented with chronic abdominal pain.  Patient has a history of high frequency utilization of the emergency department.  Has been seen before for similar presentations, multiple CTs in the past.  She was seen a few hours prior to my evaluation and had labs and CT imaging done to evaluate for possible renal stone.  CT was negative, labs were normal, she had normal vital signs.  She was told to take naproxen, she did not follow-up or take this medication and was unable to sleep due to pain came back.  She has a benign abdomen, normal vital signs and is afebrile.  As labs are drawn only 3 to 4 hours prior to my evaluation, no indication for repeat labs.  Triage ordered a pelvic ultrasound which was completely unremarkable.  Patient treated with Tylenol and Motrin here in the ED.  Vital signs remained stable.  She was discharged home with strict return precautions outpatient follow-up plan.  CT did mention hyperdensity in the appendix that could represent residual contrast from prior radiograph examination or appendicolith but states that the appendix is otherwise unremarkable.  Considering patient has no significant point tenderness over the right lower quadrant, is afebrile, tolerating oral intake, and had no leukocytosis, I do not think there is high risk of appendicitis at this time but I instructed her to follow-up with her PCP and return if she develop fever or worsening pain.    Patient has had high blood pressure while in the emergency department, felt likely secondary to medical condition. Counseled patient to monitor blood pressure at home and follow up with primary care physician.    FINAL IMPRESSION  1. Lower abdominal pain Acute      Sun RODRIGUEZ), am scribing for, and in the presence of, Abelardo Knight.    Electronically signed by: Sun Espinosa), 10/4/2022    Abelardo RODRIGUEZ personally performed the services described in this documentation, as scribed by  Sun Bourne in my presence, and it is both accurate and complete.    The note accurately reflects work and decisions made by me.  Abelardo Knight  10/4/2022  4:49 AM

## 2022-10-04 NOTE — ED NOTES
Pt provided discharge instructions and follow-up information. Pt verbalized understanding and all questions answered. Pt assisted from ED in WC. All belongings with pt at time of discharge.

## 2022-10-04 NOTE — ED TRIAGE NOTES
"Chief Complaint   Patient presents with    Abdominal Pain     Arrives with c/o RLQ abd pain   Pt states she was seen here earlier and discharged with instructions if the pain worsens to come back  Initially pain was located more near the RUQ and has moved to the RLQ, also developed nausea and chills  States she was unable to  discharge medications and is unable to sleep due to the pain         BP (!) 150/86   Pulse 88   Temp 36.6 °C (97.9 °F) (Temporal)   Resp 18   Ht 1.6 m (5' 3\")   Wt 111 kg (245 lb)   SpO2 98%   BMI 43.40 kg/m²     Pt made aware of triage process, placed back into lobby, educated pt to tell staff of any worsening of symptoms     "

## 2022-10-04 NOTE — ED PROVIDER NOTES
ER Provider Note     Scribed for Noman Zavala M.D. by Ling Nova. 10/3/2022, 7:21 PM.    Primary Care Provider: Torres Brody M.D.  Means of Arrival: Walk-in   History obtained from: Patient  History limited by: None     CHIEF COMPLAINT  Chief Complaint   Patient presents with    RLQ Pain     Started yesterday, denies constipation or diarrhea, no c/o N/V.  H/o PCOS, reports pain is in a different location than normally with here PCOS.  Reports pain going into right flank       HPI  Kristin Balderrama is a 31 y.o. female who presents to the Emergency Department for evaluation of sudden onset right flank pain starting yesterday. The patient has a pertinent prior history of PCOS, endometriosis, kidney stones, and kidney infection. She states that this pain radiates slightly into the right lower quadrant, but does not parallel the pan of prior PCOS flares. She has an associated mild fever, but denies any nausea, vomiting, constipation, or diarrhea.     REVIEW OF SYSTEMS  See HPI for further details. All other systems are negative. C    PAST MEDICAL HISTORY   has a past medical history of Abdominal pain, Anginal syndrome, Apnea, sleep, Arrhythmia, Arthritis, ASTHMA, Back pain, Borderline personality disorder (HCC), Breath shortness, Bronchitis (02/08/2022), Cardiac arrhythmia, Chickenpox, Chronic UTI (09/18/2010), Cough, Daytime sleepiness, Dental disorder (03/08/2021), Depression, Diabetes (HCC), Diarrhea, Disorder of thyroid, Fall, Fatigue, Frequent headaches, Gasping for breath, Gynecological disorder, Headache(784.0), Heart burn, Heart murmur, History of falling, Indigestion, Migraine, Mitochondrial disease (HCC), Multiple personality disorder (HCC), Nausea, Obesity, Other fatigue (06/29/2020), Pain (08/15/2012), Painful joint, PCOS (polycystic ovarian syndrome), Pneumonia (2012 12/2020), Psychosis (HCC), Ringing in ears, Scoliosis, Shortness of breath, Sinus tachycardia (10/31/2013), Sleep apnea,  Snoring, Tonsillitis, 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HCC), Tuberculosis, Urinary bladder disorder, Urinary incontinence, Weakness, and Wears glasses.    SURGICAL HISTORY   has a past surgical history that includes neuro dest facet l/s w/ig sngl (4/21/2015); recovery (1/27/2016); delmar by laparoscopy (8/29/2010); lumbar fusion anterior (8/21/2012); other cardiac surgery (1/2016); tonsillectomy; bowel stimulator insertion (7/13/2016); gastroscopy with balloon dilatation (N/A, 1/4/2017); muscle biopsy (Right, 1/26/2017); other abdominal surgery; laminotomy; bowel stimulator insertion (3/10/2021); lap,diagnostic abdomen (2/14/2022); and ovarian cystectomy (Right, 2/14/2022).    SOCIAL HISTORY  Social History     Tobacco Use    Smoking status: Never    Smokeless tobacco: Never   Vaping Use    Vaping Use: Never used   Substance Use Topics    Alcohol use: No     Alcohol/week: 0.0 oz    Drug use: Not Currently     Frequency: 7.0 times per week     Types: Marijuana      Social History     Substance and Sexual Activity   Drug Use Not Currently    Frequency: 7.0 times per week    Types: Marijuana       FAMILY HISTORY  Family History   Problem Relation Age of Onset    Hypertension Mother     Sleep Apnea Mother     Heart Disease Mother     Other Mother         hypothryod    Hypertension Maternal Uncle     Heart Disease Maternal Grandmother     Hypertension Maternal Grandmother     No Known Problems Sister     Other Sister         Narcolepsy;fibromyalsia;bone;nerve    Genitourinary () Problems Sister         endometriosis       CURRENT MEDICATIONS  Home Medications       Reviewed by Arelis Waldron R.N. (Registered Nurse) on 10/03/22 at 1804  Med List Status: Partial     Medication Last Dose Status   albuterol 108 (90 Base) MCG/ACT Aero Soln inhalation aerosol  Active   Aspirin-Acetaminophen-Caffeine (EXCEDRIN MIGRAINE PO)  Active   docusate sodium (COLACE) 100 MG Cap  Active   etonogestrel (NEXPLANON) 68 MG Implant implant  Active  "  ivabradine (CORLANOR) 7.5 MG Tab tablet  Active   lactulose 20 GM/30ML Solution  Active   Melatonin 10 MG Tab  Active   ondansetron (ZOFRAN ODT) 4 MG TABLET DISPERSIBLE  Active   sulfamethoxazole-trimethoprim (BACTRIM DS) 800-160 MG tablet  Active   SUMAtriptan (IMITREX) 50 MG Tab  Active   traZODone (DESYREL) 100 MG Tab  Active   ziprasidone (GEODON) 40 MG Cap  Active          ALLERGIES  Allergies   Allergen Reactions    Depakote [Divalproex Sodium] Unspecified     Muscle spasms/muscle pain and weakness      Doxycycline Anaphylaxis and Vomiting     Other reaction(s): pustules/blisters    Montelukast [Singulair] Unspecified     Cardiac effusion    Vancomycin Itching     Pt becomes flushed in face and chest.   RXN=7/10/16    Amitriptyline Unspecified     Headaches      Aripiprazole [Abilify] Unspecified     Headaches/muscle twitching      Clindamycin Nausea           Flomax [Tamsulosin Hydrochloride] Swelling    Metformin Unspecified     Increased lactic acid     Other reaction(s): itching and rash/nausea vomiting    Tamsulosin Swelling     Swelling of legs    Tape Rash     Tears skin off  coban with Tegaderm tape ok intermittently  RXN=ongoing    Wound Dressing Adhesive Hives     By pt report    Ampicillin Rash     Pt reports that she received a rash     Ciprofloxacin Rash          Keflex Rash     Pt states she gets a rash with this medication  Tolerates ceftriaxone  Can take with Benadryl    Levofloxacin Unspecified     Leg muscle cramps    Metronidazole Rash     \"Vision problems\"    Penicillins Hives     Can take with Benadryl    Sulfa Drugs Itching and Myalgia     Muscle pain and weakness    Valproic Acid Rash     .       PHYSICAL EXAM  VITAL SIGNS: /88   Pulse 89   Temp 36.8 °C (98.2 °F)   Resp 17   Ht 1.6 m (5' 3\")   Wt 111 kg (245 lb 9.5 oz)   SpO2 99%   BMI 43.50 kg/m²    Constitutional: Alert in mild distress secondary to pain.  HENT: No signs of trauma, Bilateral external ears normal, Nose " normal.   Eyes: Pupils are equal and reactive, Conjunctiva normal, Non-icteric.   Neck: Normal range of motion, No tenderness, Supple, No stridor.   Lymphatic: No lymphadenopathy noted.   Cardiovascular: Regular rate and rhythm, no palpable thrill  Thorax & Lungs: No respiratory distress,  No chest tenderness.  CTAB  Abdomen: Mild tenderness in the right flank into the right lower quadrant. Bowel sounds normal, Soft, No masses, No pulsatile masses. No peritoneal signs.  Skin: Warm, Dry, No erythema, No rash.   Back: No bony tenderness, No CVA tenderness.   Extremities: Intact distal pulses, No edema, No tenderness, No cyanosis.  Musculoskeletal: Good range of motion in all major joints. No tenderness to palpation or major deformities noted.   Neurologic: Alert , Normal motor function, Normal sensory function, No focal deficits noted.   Psychiatric: Affect normal, Judgment normal, Mood normal.     DIAGNOSTIC STUDIES / PROCEDURES    LABS  Labs Reviewed   COMP METABOLIC PANEL - Abnormal; Notable for the following components:       Result Value    Co2 16 (*)     Glucose 154 (*)     All other components within normal limits   URINALYSIS,CULTURE IF INDICATED - Abnormal; Notable for the following components:    Leukocyte Esterase Small (*)     All other components within normal limits    Narrative:     Indication for culture:->Patient WITHOUT an indwelling Andrade  catheter in place with new onset of Dysuria, Frequency,  Urgency, and/or Suprapubic pain   URINE MICROSCOPIC (W/UA) - Abnormal; Notable for the following components:    WBC 5-10 (*)     RBC 2-5 (*)     Epithelial Cells Moderate (*)     All other components within normal limits    Narrative:     Indication for culture:->Patient WITHOUT an indwelling Andrade  catheter in place with new onset of Dysuria, Frequency,  Urgency, and/or Suprapubic pain   CBC WITH DIFFERENTIAL   LIPASE   HCG QUAL SERUM   ESTIMATED GFR     All labs reviewed by me.    RADIOLOGY  CT-RENAL COLIC  EVALUATION(A/P W/O)   Final Result         1.  Bilateral nephrolithiasis without visualized obstructive changes.   2.  Hyperdensity in the appendix, could represent residual contrast from prior radiographic examination appendicolith, otherwise the appendix is unremarkable   3.  Small fat-containing right inguinal hernia   4.  Hepatomegaly         The radiologist's interpretation of all radiological studies have been reviewed by me.    COURSE & MEDICAL DECISION MAKING  Pertinent Labs & Imaging studies reviewed. (See chart for details)    This is a 31 y.o. female that presents with right flank pain.  We will evaluate the patient for kidney stone.  We will evaluate the patient pancreatitis and pregnancy.  We will evaluate the patient for urinary tract infection and pyonephritis.  We will reassess after this..     7:21 PM - Patient seen and examined at bedside. Ordered CT-renal colic, Estimated GFR, CBC with differential, CMP, Lipase, HCQ qual, and UA culture.  Patient will be medicated with Toradol 15 mg injection for her symptoms. Discussed plan of care with the patient at bedside. Patient verbalizes understanding and agreement to this plan of care.     10:21 PM - Patient reevaluated at bedside. Discussed plan for discharge; I advised the patient to follow-up with PCP as soon as possible, and to return to the [facility] ED with any new or worsening symptoms, including fever, bloody urine. Patient was given the opportunity for questions. I addressed all questions or concerns at this time and they verbalize agreement to the plan of care.     The patient will return for new or worsening symptoms and is stable at the time of discharge.    The patient has no infection in the urine.  There is normal creatinine.  The patient does have bilateral nephrolithiasis.  There might have been a small stone that passed this is what causing the discomfort.  Is a hyperdensity in the appendix that could have present a residual contrast from  prior radiographic examination however there is no appendicitis.  At this time we will discharge the patient home with strict return precautions and follow-up as well as pain medication.    The patient is referred to a primary physician for blood pressure management, diabetic screening, and for all other preventative health concerns.      DISPOSITION:  Patient will be discharged home in stable condition.    FOLLOW UP:  Torres Brody M.D.  [address] [facility] [address]  [facility] [id number] [phone]    In 2 days      OUTPATIENT MEDICATIONS:  Discharge Medication List as of 10/3/2022 10:10 PM        START taking these medications    Details   naproxen (NAPROSYN) 500 MG Tab Take 1 Tablet by mouth 2 times a day with meals., Disp-15 Tablet, R-0, Normal             FINAL IMPRESSION  1. Flank pain          Ling RODRIGUEZ (Eva), am scribing for, and in the presence of, Noman Zavala M.D..    Electronically signed by: Ling Nova (Eva), 10/3/2022    Noman RODRIGUEZ M.D. personally performed the services described in this documentation, as scribed by Ling Nova in my presence, and it is both accurate and complete.     The note accurately reflects work and decisions made by me.  Noman Zavala M.D.  10/4/2022  1:57 AM

## 2022-10-04 NOTE — ED TRIAGE NOTES
Pt ambulate to triage with   Chief Complaint   Patient presents with    RLQ Pain     Started yesterday, denies constipation or diarrhea, no c/o N/V.  H/o PCOS, reports pain is in a different location than normally with here PCOS.  Reports pain going into right flank       Protocol ordered.  Pt Informed regarding triage process and verbalized understanding to inform triage tech or RN for any changes in condition. Placed in lobby.

## 2022-10-04 NOTE — DISCHARGE INSTRUCTIONS
Please follow-up with your outpatient team for further evaluation treatment.  Come back if symptoms worsen.  You can take Tylenol or Motrin over-the-counter for pain control.  Thank you for coming in today.

## 2022-10-07 ENCOUNTER — HOSPITAL ENCOUNTER (EMERGENCY)
Facility: MEDICAL CENTER | Age: 32
End: 2022-10-07
Attending: EMERGENCY MEDICINE
Payer: MEDICARE

## 2022-10-07 ENCOUNTER — APPOINTMENT (OUTPATIENT)
Dept: RADIOLOGY | Facility: MEDICAL CENTER | Age: 32
End: 2022-10-07
Attending: EMERGENCY MEDICINE
Payer: MEDICARE

## 2022-10-07 VITALS
SYSTOLIC BLOOD PRESSURE: 146 MMHG | BODY MASS INDEX: 43.48 KG/M2 | DIASTOLIC BLOOD PRESSURE: 76 MMHG | TEMPERATURE: 97 F | HEIGHT: 63 IN | HEART RATE: 55 BPM | WEIGHT: 245.37 LBS | RESPIRATION RATE: 16 BRPM | OXYGEN SATURATION: 97 %

## 2022-10-07 DIAGNOSIS — K59.00 CONSTIPATION, UNSPECIFIED CONSTIPATION TYPE: ICD-10-CM

## 2022-10-07 DIAGNOSIS — K40.90 RIGHT INGUINAL HERNIA: ICD-10-CM

## 2022-10-07 LAB
ALBUMIN SERPL BCP-MCNC: 4.4 G/DL (ref 3.2–4.9)
ALBUMIN/GLOB SERPL: 2 G/DL
ALP SERPL-CCNC: 80 U/L (ref 30–99)
ALT SERPL-CCNC: 42 U/L (ref 2–50)
ANION GAP SERPL CALC-SCNC: 13 MMOL/L (ref 7–16)
APPEARANCE UR: CLEAR
AST SERPL-CCNC: 18 U/L (ref 12–45)
BASOPHILS # BLD AUTO: 0.4 % (ref 0–1.8)
BASOPHILS # BLD: 0.02 K/UL (ref 0–0.12)
BILIRUB SERPL-MCNC: 0.3 MG/DL (ref 0.1–1.5)
BILIRUB UR QL STRIP.AUTO: NEGATIVE
BUN SERPL-MCNC: 11 MG/DL (ref 8–22)
CALCIUM SERPL-MCNC: 9 MG/DL (ref 8.5–10.5)
CHLORIDE SERPL-SCNC: 110 MMOL/L (ref 96–112)
CO2 SERPL-SCNC: 13 MMOL/L (ref 20–33)
COLOR UR: YELLOW
CREAT SERPL-MCNC: 0.6 MG/DL (ref 0.5–1.4)
EOSINOPHIL # BLD AUTO: 0.14 K/UL (ref 0–0.51)
EOSINOPHIL NFR BLD: 2.5 % (ref 0–6.9)
ERYTHROCYTE [DISTWIDTH] IN BLOOD BY AUTOMATED COUNT: 40.6 FL (ref 35.9–50)
GFR SERPLBLD CREATININE-BSD FMLA CKD-EPI: 121 ML/MIN/1.73 M 2
GLOBULIN SER CALC-MCNC: 2.2 G/DL (ref 1.9–3.5)
GLUCOSE SERPL-MCNC: 117 MG/DL (ref 65–99)
GLUCOSE UR STRIP.AUTO-MCNC: NEGATIVE MG/DL
HCG SERPL QL: NEGATIVE
HCT VFR BLD AUTO: 39.7 % (ref 37–47)
HGB BLD-MCNC: 13.9 G/DL (ref 12–16)
IMM GRANULOCYTES # BLD AUTO: 0.02 K/UL (ref 0–0.11)
IMM GRANULOCYTES NFR BLD AUTO: 0.4 % (ref 0–0.9)
KETONES UR STRIP.AUTO-MCNC: NEGATIVE MG/DL
LEUKOCYTE ESTERASE UR QL STRIP.AUTO: NEGATIVE
LIPASE SERPL-CCNC: 39 U/L (ref 11–82)
LYMPHOCYTES # BLD AUTO: 2.12 K/UL (ref 1–4.8)
LYMPHOCYTES NFR BLD: 38.2 % (ref 22–41)
MCH RBC QN AUTO: 31.6 PG (ref 27–33)
MCHC RBC AUTO-ENTMCNC: 35 G/DL (ref 33.6–35)
MCV RBC AUTO: 90.2 FL (ref 81.4–97.8)
MICRO URNS: NORMAL
MONOCYTES # BLD AUTO: 0.3 K/UL (ref 0–0.85)
MONOCYTES NFR BLD AUTO: 5.4 % (ref 0–13.4)
NEUTROPHILS # BLD AUTO: 2.95 K/UL (ref 2–7.15)
NEUTROPHILS NFR BLD: 53.1 % (ref 44–72)
NITRITE UR QL STRIP.AUTO: NEGATIVE
NRBC # BLD AUTO: 0 K/UL
NRBC BLD-RTO: 0 /100 WBC
PH UR STRIP.AUTO: 6 [PH] (ref 5–8)
PLATELET # BLD AUTO: 186 K/UL (ref 164–446)
PMV BLD AUTO: 10.7 FL (ref 9–12.9)
POTASSIUM SERPL-SCNC: 3.7 MMOL/L (ref 3.6–5.5)
PROT SERPL-MCNC: 6.6 G/DL (ref 6–8.2)
PROT UR QL STRIP: NEGATIVE MG/DL
RBC # BLD AUTO: 4.4 M/UL (ref 4.2–5.4)
RBC UR QL AUTO: NEGATIVE
SODIUM SERPL-SCNC: 136 MMOL/L (ref 135–145)
SP GR UR STRIP.AUTO: 1.02
UROBILINOGEN UR STRIP.AUTO-MCNC: 1 MG/DL
WBC # BLD AUTO: 5.6 K/UL (ref 4.8–10.8)

## 2022-10-07 PROCEDURE — 74019 RADEX ABDOMEN 2 VIEWS: CPT

## 2022-10-07 PROCEDURE — 81003 URINALYSIS AUTO W/O SCOPE: CPT

## 2022-10-07 PROCEDURE — 36415 COLL VENOUS BLD VENIPUNCTURE: CPT

## 2022-10-07 PROCEDURE — 700102 HCHG RX REV CODE 250 W/ 637 OVERRIDE(OP): Performed by: EMERGENCY MEDICINE

## 2022-10-07 PROCEDURE — 96372 THER/PROPH/DIAG INJ SC/IM: CPT

## 2022-10-07 PROCEDURE — 700111 HCHG RX REV CODE 636 W/ 250 OVERRIDE (IP): Performed by: EMERGENCY MEDICINE

## 2022-10-07 PROCEDURE — 85025 COMPLETE CBC W/AUTO DIFF WBC: CPT

## 2022-10-07 PROCEDURE — A9270 NON-COVERED ITEM OR SERVICE: HCPCS | Performed by: EMERGENCY MEDICINE

## 2022-10-07 PROCEDURE — 80053 COMPREHEN METABOLIC PANEL: CPT

## 2022-10-07 PROCEDURE — 83690 ASSAY OF LIPASE: CPT

## 2022-10-07 PROCEDURE — 84703 CHORIONIC GONADOTROPIN ASSAY: CPT

## 2022-10-07 PROCEDURE — 99284 EMERGENCY DEPT VISIT MOD MDM: CPT

## 2022-10-07 RX ORDER — KETOROLAC TROMETHAMINE 30 MG/ML
30 INJECTION, SOLUTION INTRAMUSCULAR; INTRAVENOUS ONCE
Status: COMPLETED | OUTPATIENT
Start: 2022-10-07 | End: 2022-10-07

## 2022-10-07 RX ORDER — DICYCLOMINE HYDROCHLORIDE 10 MG/ML
20 INJECTION INTRAMUSCULAR ONCE
Status: COMPLETED | OUTPATIENT
Start: 2022-10-07 | End: 2022-10-07

## 2022-10-07 RX ORDER — POLYETHYLENE GLYCOL 3350 17 G/17G
17 POWDER, FOR SOLUTION ORAL 2 TIMES DAILY
Qty: 340 G | Refills: 0 | Status: SHIPPED | OUTPATIENT
Start: 2022-10-07 | End: 2022-10-17

## 2022-10-07 RX ADMIN — KETOROLAC TROMETHAMINE 30 MG: 30 INJECTION, SOLUTION INTRAMUSCULAR at 22:31

## 2022-10-07 RX ADMIN — DICYCLOMINE HYDROCHLORIDE 20 MG: 20 INJECTION, SOLUTION INTRAMUSCULAR at 23:00

## 2022-10-07 RX ADMIN — LIDOCAINE HYDROCHLORIDE 30 ML: 20 SOLUTION OROPHARYNGEAL at 22:34

## 2022-10-07 ASSESSMENT — FIBROSIS 4 INDEX: FIB4 SCORE: 0.61

## 2022-10-08 NOTE — ED PROVIDER NOTES
ED Provider Note    Scribed for Lynn Ward M.D. by Vandana King. 10/7/2022  9:47 PM    Primary care provider: Torres Brody M.D.  Means of arrival: EMS  History obtained from: Patient  History limited by: None    CHIEF COMPLAINT  Chief Complaint   Patient presents with    Abdominal Pain     PT reports seen 5 days ago for same. PT reports RLQ pain and told to return if pain is worst. PT states bilateral stones in her kidney and something wrong with her appendix per her scans. PT reports she urinated out a stone this morning. Pain 8/10.      HPI  Kristin Balderrama is a 31 y.o. female diabetic who presents to the Emergency Department for right 9/10 lower quadrant abdominal pain onset 5 days ago. The patient reports her pain radiates across the front of her abdomen in the periumbilical region. She notes symptoms of nausea, sleep changes, and loss of appetite. She denies symptoms of fever or chills. She reports a surgical history of a cholecystectomy. She denies the use of tobacco, alcohol, and drugs. No alleviating or exacerbating factors noted.     REVIEW OF SYSTEMS  HEENT:  No ear pain, congestion, or sore throat   EYES: no discharge, redness, or vision changes  CARDIAC: no chest pain, no palpitations    PULMONARY: no dyspnea, cough, or congestion   GI: right lower quadrant abdominal pain. no vomiting or diarrhea  : no dysuria, back pain, or hematuria   Neuro: loss of appetite. no weakness, numbness, aphasia, or headache  Musculoskeletal: no swelling, deformity, pain, or joint swelling  Endocrine: no fevers, sweating, or weight loss   SKIN: no rash, erythema, or contusions     See history of present illness. All other systems are negative. C.    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    SURGICAL HISTORY   has a past surgical history that includes neuro dest facet l/s w/ig sngl (4/21/2015); recovery (1/27/2016); delmar by laparoscopy (8/29/2010); lumbar fusion anterior (8/21/2012); other cardiac surgery (1/2016); tonsillectomy; bowel stimulator insertion (7/13/2016); gastroscopy with balloon dilatation (N/A, 1/4/2017); muscle biopsy (Right, 1/26/2017); other abdominal surgery; laminotomy; bowel stimulator insertion (3/10/2021); lap,diagnostic abdomen (2/14/2022); and ovarian cystectomy (Right, 2/14/2022).    SOCIAL HISTORY  Social History     Tobacco Use    Smoking status: Never    Smokeless tobacco: Never   Vaping Use    Vaping Use: Never used   Substance Use Topics    Alcohol use: No     Alcohol/week: 0.0 oz    Drug use: Not Currently     Frequency: 7.0 times per week     Types: Marijuana      Social History     Substance and Sexual Activity   Drug Use Not Currently    Frequency: 7.0 times per week    Types: Marijuana     FAMILY HISTORY  Family History   Problem Relation Age of Onset    Hypertension Mother     Sleep Apnea Mother     Heart Disease Mother     Other Mother         hypothryod    Hypertension Maternal Uncle     Heart Disease Maternal  Grandmother     Hypertension Maternal Grandmother     No Known Problems Sister     Other Sister         Narcolepsy;fibromyalsia;bone;nerve    Genitourinary () Problems Sister         endometriosis     CURRENT MEDICATIONS  Home Medications       Reviewed by Ruth Ann Espinal R.N. (Registered Nurse) on 10/07/22 at 2109  Med List Status: Partial     Medication Last Dose Status   albuterol 108 (90 Base) MCG/ACT Aero Soln inhalation aerosol  Active   Aspirin-Acetaminophen-Caffeine (EXCEDRIN MIGRAINE PO)  Active   docusate sodium (COLACE) 100 MG Cap  Active   etonogestrel (NEXPLANON) 68 MG Implant implant  Active   ivabradine (CORLANOR) 7.5 MG Tab tablet  Active   lactulose 20 GM/30ML Solution  Active   Melatonin 10 MG Tab  Active   naproxen (NAPROSYN) 500 MG Tab  Active   ondansetron (ZOFRAN ODT) 4 MG TABLET DISPERSIBLE  Active   sulfamethoxazole-trimethoprim (BACTRIM DS) 800-160 MG tablet  Active   SUMAtriptan (IMITREX) 50 MG Tab  Active   traZODone (DESYREL) 100 MG Tab  Active   ziprasidone (GEODON) 40 MG Cap  Active                  ALLERGIES  Allergies   Allergen Reactions    Depakote [Divalproex Sodium] Unspecified     Muscle spasms/muscle pain and weakness      Doxycycline Anaphylaxis and Vomiting     Other reaction(s): pustules/blisters    Montelukast [Singulair] Unspecified     Cardiac effusion    Vancomycin Itching     Pt becomes flushed in face and chest.   RXN=7/10/16    Amitriptyline Unspecified     Headaches      Aripiprazole [Abilify] Unspecified     Headaches/muscle twitching      Clindamycin Nausea           Flomax [Tamsulosin Hydrochloride] Swelling    Metformin Unspecified     Increased lactic acid     Other reaction(s): itching and rash/nausea vomiting    Tamsulosin Swelling     Swelling of legs    Tape Rash     Tears skin off  coban with Tegaderm tape ok intermittently  RXN=ongoing    Wound Dressing Adhesive Hives     By pt report    Ampicillin Rash     Pt reports that she received a rash      "Ciprofloxacin Rash          Keflex Rash     Pt states she gets a rash with this medication  Tolerates ceftriaxone  Can take with Benadryl    Levofloxacin Unspecified     Leg muscle cramps    Metronidazole Rash     \"Vision problems\"    Penicillins Hives     Can take with Benadryl    Sulfa Drugs Itching and Myalgia     Muscle pain and weakness    Valproic Acid Rash     .     PHYSICAL EXAM  VITAL SIGNS: BP (!) 141/91   Pulse 72   Temp 36.9 °C (98.4 °F) (Temporal)   Resp 16   Ht 1.6 m (5' 3\")   Wt 111 kg (245 lb 6 oz)   SpO2 98%   BMI 43.47 kg/m²     Constitutional: Well developed, Well nourished, No acute distress, Non-toxic appearance.   HEENT: Normocephalic, Atraumatic,  external ears normal, pharynx pink,  Mucous  Membranes moist, No rhinorrhea or mucosal edema  Eyes: PERRL, EOMI, Conjunctiva normal, No discharge.   Neck: Normal range of motion, No tenderness, Supple, No stridor.   Lymphatic: No lymphadenopathy    Cardiovascular: Regular Rate and Rhythm, No murmurs,  rubs, or gallops.   Thorax & Lungs: Lungs clear to auscultation bilaterally, No respiratory distress, No wheezes, rhales or rhonchi, No chest wall tenderness.   Abdomen: R right lower abdominal pain palpation. Bowel sounds normal, Soft, non distended, no rebound guarding or peritoneal signs.   Skin: Warm, Dry, No erythema, No rash,   Back:  No CVA tenderness,  No spinal tenderness, bony crepitance, step offs, or instability.   Neurologic: Alert & oriented clear speech no focal deficits  Extremities: Equal, intact distal pulses, No cyanosis, clubbing or edema,  No tenderness.   Musculoskeletal: Good range of motion in all major joints. No tenderness to palpation or major deformities noted.     DIAGNOSTIC STUDIES / PROCEDURES    LABS  Results for orders placed or performed during the hospital encounter of 10/07/22   CBC WITH DIFFERENTIAL   Result Value Ref Range    WBC 5.6 4.8 - 10.8 K/uL    RBC 4.40 4.20 - 5.40 M/uL    Hemoglobin 13.9 12.0 - 16.0 " g/dL    Hematocrit 39.7 37.0 - 47.0 %    MCV 90.2 81.4 - 97.8 fL    MCH 31.6 27.0 - 33.0 pg    MCHC 35.0 33.6 - 35.0 g/dL    RDW 40.6 35.9 - 50.0 fL    Platelet Count 186 164 - 446 K/uL    MPV 10.7 9.0 - 12.9 fL    Neutrophils-Polys 53.10 44.00 - 72.00 %    Lymphocytes 38.20 22.00 - 41.00 %    Monocytes 5.40 0.00 - 13.40 %    Eosinophils 2.50 0.00 - 6.90 %    Basophils 0.40 0.00 - 1.80 %    Immature Granulocytes 0.40 0.00 - 0.90 %    Nucleated RBC 0.00 /100 WBC    Neutrophils (Absolute) 2.95 2.00 - 7.15 K/uL    Lymphs (Absolute) 2.12 1.00 - 4.80 K/uL    Monos (Absolute) 0.30 0.00 - 0.85 K/uL    Eos (Absolute) 0.14 0.00 - 0.51 K/uL    Baso (Absolute) 0.02 0.00 - 0.12 K/uL    Immature Granulocytes (abs) 0.02 0.00 - 0.11 K/uL    NRBC (Absolute) 0.00 K/uL   COMP METABOLIC PANEL   Result Value Ref Range    Sodium 136 135 - 145 mmol/L    Potassium 3.7 3.6 - 5.5 mmol/L    Chloride 110 96 - 112 mmol/L    Co2 13 (L) 20 - 33 mmol/L    Anion Gap 13.0 7.0 - 16.0    Glucose 117 (H) 65 - 99 mg/dL    Bun 11 8 - 22 mg/dL    Creatinine 0.60 0.50 - 1.40 mg/dL    Calcium 9.0 8.5 - 10.5 mg/dL    AST(SGOT) 18 12 - 45 U/L    ALT(SGPT) 42 2 - 50 U/L    Alkaline Phosphatase 80 30 - 99 U/L    Total Bilirubin 0.3 0.1 - 1.5 mg/dL    Albumin 4.4 3.2 - 4.9 g/dL    Total Protein 6.6 6.0 - 8.2 g/dL    Globulin 2.2 1.9 - 3.5 g/dL    A-G Ratio 2.0 g/dL   LIPASE   Result Value Ref Range    Lipase 39 11 - 82 U/L   HCG QUAL SERUM   Result Value Ref Range    Beta-Hcg Qualitative Serum Negative Negative   URINALYSIS,CULTURE IF INDICATED    Specimen: Urine   Result Value Ref Range    Color Yellow     Character Clear     Specific Gravity 1.024 <1.035    Ph 6.0 5.0 - 8.0    Glucose Negative Negative mg/dL    Ketones Negative Negative mg/dL    Protein Negative Negative mg/dL    Bilirubin Negative Negative    Urobilinogen, Urine 1.0 Negative    Nitrite Negative Negative    Leukocyte Esterase Negative Negative    Occult Blood Negative Negative    Micro Urine  Req see below    ESTIMATED GFR   Result Value Ref Range    GFR (CKD-EPI) 121 >60 mL/min/1.73 m 2     *Note: Due to a large number of results and/or encounters for the requested time period, some results have not been displayed. A complete set of results can be found in Results Review.     All labs reviewed by me.    RADIOLOGY  [id number] VIEWS   Final Result         1.  Moderate quantity of stool in the colon favors changes of constipation, otherwise nonspecific bowel gas pattern.        The radiologist's interpretation of all radiological studies have been reviewed by me.    COURSE & MEDICAL DECISION MAKING  Nursing notes, VS, PMSFHx reviewed in chart.    Review of patient's records show she has been seen for chronic abdominal pain. Her last visit was on 10/04/22. She has had multiple CT scans and blood work done which have all been normal set for a right inguinal fat-containing hernia which she has not had repaired.  The scans show no appendicitis or colitis.     9:47 PM Patient seen and examined at bedside. Patient will be treated with Toradol and GI cocktail. Ordered UA Culture, HCG Qual Serum, Lipase, CMP, DX-Abdomen and CBC w/ diff. to evaluate her symptoms. The differential diagnoses include but are not limited to: Hernia vs. I discussed with the patient the plan for treatment and an X-ray to make sure there is no gas or obstructions. I informed the patient of the plan to refer her to a surgeon for her right inguinal hernia which may be causing her symptoms.     10:48 PM - I discussed the lab and radiology results with the patient. I informed her that she is slightly constipated. I discussed with the patient that I will send her home with Miralax and refer her to a surgeon for her hernia. I discussed plan for discharge and follow up as outlined below. The patient is stable for discharge at this time and will return for any new or worsening symptoms. Patient verbalizes understanding and support with my plan  for discharge.     Medical Decision Making  Problems (number and complexity of problems being simultaneously addressed)         Acute on chronic abdominal pain  Data ( amount or complexity of data reviewed and analysed, discuss why you are or are not doing tests or giving medication)        Labs and x-ray of the abdomen were obtained.  The x-ray showed constipation.  She has a normal white count, and I am not able to palpate an incarcerated hernia.  I advised the patient wear an abdominal band for support and follow-up with surgery for hernia repair.  I also will treat her with MiraLAX for constipation.  I reassured her that her labs did not show any evidence of infection and her exam does not show any evidence of incarcerated hernia.  3. Risk (risk of complications and or morbidity/mortality of the patient managed. Discuss social determinants and compliance issues)        Low risk for morbidity or morality.       The patient will return for new or worsening symptoms and is stable at the time of discharge.    The patient is referred to a primary physician for diabetic screening and for all other preventative health concerns.    DISPOSITION:  Patient will be discharged home in stable condition.    FOLLOW UP:  Abelardo Lomas M.D.  [address]  [address]  [facility] [id number] [phone]    Call in 2 days  to establish care, for recheck    Torres Brody M.D.  [address]  [facility] [id number] [phone]    In 2 days  for recheck    OUTPATIENT MEDICATIONS:  Discharge Medication List as of 10/7/2022 11:08 PM        START taking these medications    Details   polyethylene glycol 3350 (MIRALAX) 17 GM/SCOOP Powder Take 17 g by mouth 2 times a day for 10 days., Disp-340 g, R-0, Normal           FINAL IMPRESSION  1. Right inguinal hernia    2. Constipation, unspecified constipation type       I, Vandana King (Scribsadie), am scribing for, and in the presence of, Lynn Ward,  M.D..    Electronically signed by: Vandana King (Scribsadie), 10/7/2022    The note accurately reflects work and decisions made by me.  Lynn Ward M.D.  10/8/2022  12:30 AM

## 2022-10-08 NOTE — ED NOTES
"Pt discharged to self. The pt is alert and oriented, calm and cooperative, talks with clear coherent speech, ambulates with a steady gait, and moves extremities to dress self. The pt states will get a cab. The pt reports pain, but RN medicated per mar prior to discharge. Vitals stable on room air. The pt taken out by wheelchair. pt in possession of belongings. Pt provided discharge education and information pertaining to medications and follow up appointments. Pt received copy of discharge instructions and verbalized understanding. BP (!) 146/76   Pulse (!) 55   Temp 36.1 °C (97 °F) (Oral)   Resp 16   Ht 1.6 m (5' 3\")   Wt 111 kg (245 lb 6 oz)   SpO2 97%   BMI 43.47 kg/m²     "

## 2022-10-08 NOTE — ED NOTES
The pt is laying in bed alert and oriented. Vitals stable on the monitor. The pt reports abd pain. RN to medicate per mar.

## 2022-10-08 NOTE — ED NOTES
The pt moved to 62 by wheelchair. The pt hooked up to the monitor. The pt ambulated with a steady gait from wheelchair to gurney

## 2022-10-08 NOTE — ED TRIAGE NOTES
Chief Complaint   Patient presents with    Abdominal Pain     PT reports seen 5 days ago for same. PT reports RLQ pain and told to return if pain is worst. PT states bilateral stones in her kidney and something wrong with her appendix per her scans. PT reports she urinated out a stone this morning. Pain 8/10.

## 2022-10-15 NOTE — PROGRESS NOTES
Patient returned from having the suprapubic catheter exchanged. The BLESSING [facility]inson was taking the vital signs as ordered. The patient had what looked like a seizure. Patient head went back, Patient was non responsive, fists clenched. CNA called for assistance. SONYA Gibson was first to respond. Patient's vital signs taken patient came out of it,unsure what had happen. Dr. Velasquez notified reported what had transpired. Patient resting comfortable at this time vital signs are stable no further episodes. Will continue to monitor patient.   Medical Assessment Completed on: 15-Oct-2022 14:56

## 2022-10-16 ENCOUNTER — HOSPITAL ENCOUNTER (EMERGENCY)
Facility: MEDICAL CENTER | Age: 32
End: 2022-10-16
Attending: EMERGENCY MEDICINE
Payer: MEDICARE

## 2022-10-16 VITALS
SYSTOLIC BLOOD PRESSURE: 141 MMHG | WEIGHT: 245 LBS | HEART RATE: 87 BPM | OXYGEN SATURATION: 96 % | DIASTOLIC BLOOD PRESSURE: 68 MMHG | RESPIRATION RATE: 20 BRPM | TEMPERATURE: 97.9 F | BODY MASS INDEX: 43.41 KG/M2 | HEIGHT: 63 IN

## 2022-10-16 DIAGNOSIS — R05.1 ACUTE COUGH: ICD-10-CM

## 2022-10-16 DIAGNOSIS — B34.9 VIRAL SYNDROME: ICD-10-CM

## 2022-10-16 DIAGNOSIS — J45.909 MILD ASTHMA WITHOUT COMPLICATION, UNSPECIFIED WHETHER PERSISTENT: ICD-10-CM

## 2022-10-16 LAB
SARS-COV-2 RNA RESP QL NAA+PROBE: NOTDETECTED
SPECIMEN SOURCE: NORMAL

## 2022-10-16 PROCEDURE — U0005 INFEC AGEN DETEC AMPLI PROBE: HCPCS

## 2022-10-16 PROCEDURE — 700102 HCHG RX REV CODE 250 W/ 637 OVERRIDE(OP): Performed by: EMERGENCY MEDICINE

## 2022-10-16 PROCEDURE — 99284 EMERGENCY DEPT VISIT MOD MDM: CPT

## 2022-10-16 PROCEDURE — A9270 NON-COVERED ITEM OR SERVICE: HCPCS | Performed by: EMERGENCY MEDICINE

## 2022-10-16 PROCEDURE — U0003 INFECTIOUS AGENT DETECTION BY NUCLEIC ACID (DNA OR RNA); SEVERE ACUTE RESPIRATORY SYNDROME CORONAVIRUS 2 (SARS-COV-2) (CORONAVIRUS DISEASE [COVID-19]), AMPLIFIED PROBE TECHNIQUE, MAKING USE OF HIGH THROUGHPUT TECHNOLOGIES AS DESCRIBED BY CMS-2020-01-R: HCPCS

## 2022-10-16 PROCEDURE — 700111 HCHG RX REV CODE 636 W/ 250 OVERRIDE (IP): Performed by: EMERGENCY MEDICINE

## 2022-10-16 RX ORDER — PREDNISONE 20 MG/1
60 TABLET ORAL ONCE
Status: COMPLETED | OUTPATIENT
Start: 2022-10-16 | End: 2022-10-16

## 2022-10-16 RX ORDER — PREDNISONE 20 MG/1
20 TABLET ORAL DAILY
Qty: 5 TABLET | Refills: 0 | Status: SHIPPED | OUTPATIENT
Start: 2022-10-16 | End: 2022-10-21

## 2022-10-16 RX ORDER — BENZONATATE 100 MG/1
100 CAPSULE ORAL 3 TIMES DAILY PRN
Qty: 21 CAPSULE | Refills: 0 | Status: SHIPPED | OUTPATIENT
Start: 2022-10-16 | End: 2022-10-23

## 2022-10-16 RX ORDER — BENZONATATE 100 MG/1
100 CAPSULE ORAL ONCE
Status: COMPLETED | OUTPATIENT
Start: 2022-10-16 | End: 2022-10-16

## 2022-10-16 RX ORDER — PROMETHAZINE HYDROCHLORIDE 25 MG/1
25 TABLET ORAL EVERY 6 HOURS PRN
Qty: 10 TABLET | Refills: 0 | Status: SHIPPED | OUTPATIENT
Start: 2022-10-16 | End: 2022-10-21

## 2022-10-16 RX ORDER — PROMETHAZINE HYDROCHLORIDE 25 MG/1
25 TABLET ORAL ONCE
Status: COMPLETED | OUTPATIENT
Start: 2022-10-16 | End: 2022-10-16

## 2022-10-16 RX ADMIN — BENZONATATE 100 MG: 100 CAPSULE ORAL at 01:54

## 2022-10-16 RX ADMIN — PREDNISONE 60 MG: 20 TABLET ORAL at 01:53

## 2022-10-16 RX ADMIN — PROMETHAZINE HYDROCHLORIDE 25 MG: 25 TABLET ORAL at 01:53

## 2022-10-16 ASSESSMENT — FIBROSIS 4 INDEX: FIB4 SCORE: 0.49

## 2022-10-16 NOTE — ED TRIAGE NOTES
"Chief Complaint   Patient presents with    Flu Like Symptoms     Patient endorses \"covid like symptoms\" including weakness, fatigue, runny nose, cough, fever, muscle aches, loss of taste and smell, and shortness of breath.      Patient BIBA for above. Patient appears short of breath and is currently tachypneic at 24/min. Patient took tylenol for a fever of apx 101 at home, currently 99.3F. Patient states she had a covid test but doesn't know the result yet because it's not a rapid test. Covid and strepp tests collected and sent to lab.     BP (!) 155/66   Pulse (!) 105   Temp 37.4 °C (99.3 °F) (Temporal)   Resp (!) 24   Ht 1.6 m (5' 3\")   Wt 111 kg (245 lb)   SpO2 97%   BMI 43.40 kg/m²    "

## 2022-10-16 NOTE — ED NOTES
Break RN:  Pt resting in bed with even and unlabored breaths, in no acute distress. Will continue to monitor pt while awaiting COVID test results.

## 2022-10-16 NOTE — LETTER
"10/17/2022             Kristin Pattianalisa Balderrama  [address] [facility] [place] [address]        Dear Kristin (MR#[id number]),    This letter is to inform you that your COVID-19 test result is NEGATIVE.  This means that the virus that causes COVID-19 was not found in your sample.      SARS-CoV-2 Source   Date Value Ref Range Status   10/16/2022 Nasal Swab  Final     SARS-CoV-2 by PCR   Date Value Ref Range Status   10/16/2022 NotDetected  Final     Comment:     PATIENTS: Important information regarding your results and instructions can  be found at [email or web address]   \"After your  Covid-19 Test\"    [facility] providers: PLEASE REFER TO DE-ESCALATION AND RETESTING PROTOCOL  on [email or web address]    **The Roche SARS-CoV-2 RT-PCR test has been made available for use under the  Emergency Use Authorization (EUA) only.         Next steps:  - Stay home while you are feeling sick.  - Monitor your symptoms and contact your healthcare provider if you get worse.  - If you have questions, contact your healthcare provider    When can my home isolation end?  If you were tested because you had close contact (defined below) with someone with COVID-19. You should stay home for 5 days after your close contact with the person.    What counts as close contact?  - You were within 6 feet of someone who has COVID-19 for a total of 15 minutes or more  - You provided care at home to someone who is sick with COVID-19  - You had direct physical contact with the person (hugged or kissed them)  - You shared eating or drinking utensils  - They sneezed, coughed, or somehow got respiratory droplets on you    If you were tested because you were exposed to a household contact with COVID-19, you should still quarantine yourself for a period of 5 days. The 5-day quarantine period begins once your affected household contact is isolated. If you are unable to quarantine yourself from your affected household contact, the 5-day quarantine period " begins when the patient meets criteria to break isolation.    If you were not exposed to a household contact with COVID-19, you may still be contagious while experiencing symptoms, so you should not return to work or regular activities until 24 hours after symptoms fully improve. For example, if you feel back to normal on Tuesday, you should remain isolated until Wednesday.    Sincerely,  [facility] Care Team

## 2022-10-16 NOTE — ED NOTES
Patient given discharge instructions and verbalized understanding appropriately, denies any further questions or concerns at this time. Patient is alert and oriented and ambulates with a steady gait. Discharged to self.

## 2022-10-16 NOTE — ED PROVIDER NOTES
"ED Provider Note      Means of Arrival: EMS  History obtained from: Patient    CHIEF COMPLAINT  Chief Complaint   Patient presents with    Flu Like Symptoms     Patient endorses \"covid like symptoms\" including weakness, fatigue, runny nose, cough, fever, muscle aches, loss of taste and smell, and shortness of breath.        HPI  Kristin Balderrama is a 32 y.o. female who presents with several days of cough, fevers, body aches, fatigue, runny nose, nasal congestion, and loss of taste and smell.  Patient states she also is concerned with exacerbation of her asthma.  She states her symptoms began several days ago.  It progressively worsened.  The cough is constant.  Nothing makes it better or worse.  She denies nausea, vomiting or diarrhea.  Does not know of any known sick contacts.  She tried her albuterol inhaler at home but the cough still continues.  Denies hemoptysis, lightheadedness, passing out, history of PE, DVT, lower extremity swelling or calf pain.    REVIEW OF SYSTEMS  CONSTITUTIONAL:  No fever  CARDIOVASCULAR:  No chest discomfort.  RESPIRATORY:  No pleuritic chest pain.  GASTROINTESTINAL:  No abdominal pain.  GENITOURINARY:   No dysuria.  MUSCULOSKELETAL:  body aches  SKIN:  No rash or suspicious lesions.  NEUROLOGIC:   No headache.    See HPI for further details.   All other systems are negative.     PAST MEDICAL HISTORY  Past Medical History:   Diagnosis Date    Abdominal pain     Anginal syndrome     random chest pain especially with tachycardia    Apnea, sleep     Arrhythmia     \"sinus tachycardia\", cariologist, Dr. Kumar; ablation 2/2016    Arthritis     osteo    ASTHMA     when around smoke    Back pain     Borderline personality disorder (HCC)     Breath shortness     with tachycardia    Bronchitis 02/08/2022    Last time was 12/21    Cardiac arrhythmia     Chickenpox     Chronic UTI 09/18/2010    Cough     Daytime sleepiness     Dental disorder 03/08/2021    infected tooth    Depression     " "Diabetes (HCC)     Diarrhea     incontinece    Disorder of thyroid     Hashimoto's    Fall     Fatigue     Frequent headaches     Gasping for breath     Gynecological disorder     PCOS    Headache(784.0)     Heart burn     Heart murmur     History of falling     Indigestion     Migraine     Mitochondrial disease (HCC)     Multiple personality disorder (HCC)     Nausea     Obesity     Other fatigue 06/29/2020    Pain 08/15/2012    back, 7/10    Painful joint     PCOS (polycystic ovarian syndrome)     Pneumonia 2012 12/2020    Psychosis (HCC)     Ringing in ears     Scoliosis     Shortness of breath     O2 as needed    Sinus tachycardia 10/31/2013    Sleep apnea     CPAP \"pulmonary doctor took me off mid year 2016\"    Snoring     Tonsillitis     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HCC)     2/8/22: Per pt: not anymore    Tuberculosis     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    Urinary bladder disorder     Suprapubic cath. 2/8/22: Not anymore.     Urinary incontinence     Weakness     Wears glasses        FAMILY HISTORY  Family History   Problem Relation Age of Onset    Hypertension Mother     Sleep Apnea Mother     Heart Disease Mother     Other Mother         hypothryod    Hypertension Maternal Uncle     Heart Disease Maternal Grandmother     Hypertension Maternal Grandmother     No Known Problems Sister     Other Sister         Narcolepsy;fibromyalsia;bone;nerve    Genitourinary () Problems Sister         endometriosis       SOCIAL HISTORY   reports that she has never smoked. She has never used smokeless tobacco. She reports that she does not currently use drugs after having used the following drugs: Marijuana. Frequency: 7.00 times per week. She reports that she does not drink alcohol.    SURGICAL HISTORY  Past Surgical History:   Procedure Laterality Date    [place] LAP,DIAGNOSTIC ABDOMEN  2/14/2022    Procedure: LAPAROSCOPY;  Surgeon: Seamus Pisano M.D.;  Location: SURGERY SAME DAY [facility];  Service: Gynecology    " OVARIAN CYSTECTOMY Right 2/14/2022    Procedure: EXCISION, CYST, OVARY;  Surgeon: Seamus Pisano M.D.;  Location: SURGERY SAME DAY [facility];  Service: Gynecology    BOWEL STIMULATOR INSERTION  3/10/2021    Procedure: INSERTION, ELECTRODE LEADS AND PULSE GENERATOR, NEUROSTIMULATOR, SACRAL - REMOVAL OF INTERSTIM WITH REPLACEMENT OF SACRAL NEUROMODULATION DEVICE;  Surgeon: Joe Noyola M.D.;  Location: SURGERY [facility];  Service: General    MUSCLE BIOPSY Right 1/26/2017    Procedure: MUSCLE BIOPSY - THIGH;  Surgeon: Isidro Vigil M.D.;  Location: SURGERY [facility];  Service:     GASTROSCOPY WITH BALLOON DILATATION N/A 1/4/2017    Procedure: GASTROSCOPY WITH DILATATION;  Surgeon: Torres Vargas M.D.;  Location: SURGERY [facility];  Service:     BOWEL STIMULATOR INSERTION  7/13/2016    Procedure: BOWEL STIMULATOR INSERTION FOR PERMANENT INTERSTIM SACRAL IMPLANT;  Surgeon: Joe Noyola M.D.;  Location: SURGERY [facility];  Service:     RECOVERY  1/27/2016    Procedure: CATH LAB EP STUDY WITH SINUS NODE MODIFICATION LA;  Surgeon: Recoveryonly Surgery;  Location: SURGERY PRE-POST PROC UNIT [facility];  Service:     OTHER CARDIAC SURGERY  1/2016    cardiac ablation    NEURO DEST FACET L/S W/IG SNGL  4/21/2015    Performed by Reza Tabor at [facility] ORS    LUMBAR FUSION ANTERIOR  8/21/2012    Performed by SUSIE SAWANT at SURGERY [facility] ORS    ALYSSA BY LAPAROSCOPY  8/29/2010    Performed by SHAYY JOHNS at SURGERY [facility] ORS    LAMINOTOMY      OTHER ABDOMINAL SURGERY      TONSILLECTOMY      tonsillectomy       CURRENT MEDICATIONS  Home Medications    **Home medications have not yet been reviewed for this encounter**         ALLERGIES  Allergies   Allergen Reactions    Depakote [Divalproex Sodium] Unspecified     Muscle spasms/muscle pain and weakness      Doxycycline Anaphylaxis and Vomiting     Other reaction(s): pustules/blisters    Montelukast [Singulair] Unspecified      "Cardiac effusion    Vancomycin Itching     Pt becomes flushed in face and chest.   RXN=7/10/16    Amitriptyline Unspecified     Headaches      Aripiprazole [Abilify] Unspecified     Headaches/muscle twitching      Clindamycin Nausea           Flomax [Tamsulosin Hydrochloride] Swelling    Metformin Unspecified     Increased lactic acid     Other reaction(s): itching and rash/nausea vomiting    Tamsulosin Swelling     Swelling of legs    Tape Rash     Tears skin off  coban with Tegaderm tape ok intermittently  RXN=ongoing    Wound Dressing Adhesive Hives     By pt report    Ampicillin Rash     Pt reports that she received a rash     Ciprofloxacin Rash          Keflex Rash     Pt states she gets a rash with this medication  Tolerates ceftriaxone  Can take with Benadryl    Levofloxacin Unspecified     Leg muscle cramps    Metronidazole Rash     \"Vision problems\"    Penicillins Hives     Can take with Benadryl    Sulfa Drugs Itching and Myalgia     Muscle pain and weakness    Valproic Acid Rash     .       PHYSICAL EXAM  VITAL SIGNS: BP (!) 155/66   Pulse (!) 105   Temp 37.4 °C (99.3 °F) (Temporal)   Resp (!) 24   Ht 1.6 m (5' 3\")   Wt 111 kg (245 lb)   SpO2 97%   BMI 43.40 kg/m²    Gen: Alert, in no acute distress, occasional coug  HENT: ATNC, nasal congestion, tolerating secretions, moist mucus membranes  Eyes: Normal conjunctiva, PERRL, EOMI  Neck: trachea midline, normal ROM  Resp: no respiratory distress, occasional cough, CTAB, trachea midline  CV: No JVD, RRR, no murmurs/rubs/gallops  Abd: non-distended  Ext: No deformities, no calf swelling/palpable cord/tenderness  Psych: normal mood  Neuro: speech fluent , CN 2-12 intact      RADIOLOGY/PROCEDURES  No orders to display       LABS  Labs Reviewed   SARS-COV-2, PCR (IN-HOUSE)        EKG  Results for orders placed or performed during the hospital encounter of 09/14/22   EKG (NOW)   Result Value Ref Range    Report       [facility] " Emergency Dept.    Test Date:  2022  Pt Name:    HARLEY DE LA CRUZ              Department: ER  MRN:        [id number]                      Room:  Gender:     Female                       Technician: [id number]  :        [id number]                   Requested By:ER TRIAGE PROTOCOL  Order #:    [id number]                    Reading MD:    Measurements  Intervals                                Axis  Rate:       67                           P:          19  ME:         132                          QRS:        10  QRSD:       97                           T:          -9  QT:         429  QTc:        453    Interpretive Statements  Sinus rhythm  Nonspecific T abnormalities, anterior leads  Compared to ECG 2022 11:41:01  No significant changes       *Note: Due to a large number of results and/or encounters for the requested time period, some results have not been displayed. A complete set of results can be found in Results Review.        COURSE & MEDICAL DECISION MAKING  Pertinent Labs & Imaging studies reviewed. (See chart for details)    Harley De La Cruz is a 32 y.o. female who presents with several days of cough, fevers, body aches, fatigue, runny nose, nasal congestion, and loss of taste and smell. PNA, CHF, PE, pneumothorax, COPD, asthma, COVID, Flu, viral syndrome, pleural, effusion, malignancy, foreign body, anemia, arrhythmia, CO poisoning, chemical exposure, anxiety, muscular disorder    Patient presents slightly tachycardic and tachypneic, however, normotensive and normal oxygenation on room air.  She has nasal congestion, frequent cough, and clear lungs.  No evidence of JVD or DVT on exam.  Patient is given DuoNeb and then albuterol neb with improvement of her symptoms.  She is given Tessalon Perles and Phenergan with improvement of her cough.  COVID, flu, RSV pending.  With clear lungs on auscultation and normal oxygenation, I do not feel that chest x-ray is necessary.  Patient tolerated p.o. well.  She  feels much improved.  Likely viral illness exacerbating her asthma, however, potential for anxiety.  Patient was informed of the results.  She is instructed to follow-up with her primary care provider.  She is comfortable to plan with outpatient follow-up and discharged in good condition with a short course of prednisone.    Appropriate PPE were worn at this encounter.     FINAL IMPRESSION  1. Viral syndrome    2. Acute cough    3. Mild asthma without complication, unspecified whether persistent      The patient will return for new or worsening symptoms and is stable at the time of discharge.    The patient is referred to a primary physician for blood pressure management, diabetic screening, and for all other preventative health concerns.      DISPOSITION:  Patient will be discharged home in stable condition.    FOLLOW UP:  Torres Brody M.D.  [address]  [place] [id number] [phone]      Schedule follow-up with your primary care provider to be seen in the next 1 to 2 weeks if your symptoms have not improved    [facility], Emergency Dept  [address] [id number] [phone]    As needed, If symptoms worsen      OUTPATIENT MEDICATIONS:  Discharge Medication List as of 10/16/2022  3:51 AM        START taking these medications    Details   benzonatate (TESSALON) 100 MG Cap Take 1 Capsule by mouth 3 times a day as needed for Cough for up to 7 days., Disp-21 Capsule, R-0, Normal      promethazine (PHENERGAN) 25 MG Tab Take 1 Tablet by mouth every 6 hours as needed for Nausea/Vomiting for up to 5 days., Disp-10 Tablet, R-0, Normal      predniSONE (DELTASONE) 20 MG Tab Take 1 Tablet by mouth every day for 5 days., Disp-5 Tablet, R-0, Normal               This dictation was created using voice recognition software. The accuracy of the dictation is limited to the abilities of the software. I expect there may be some errors of grammar and possibly content. The  nursing notes were reviewed and certain aspects of this information were incorporated into this note.

## 2022-10-24 ENCOUNTER — HOSPITAL ENCOUNTER (EMERGENCY)
Facility: MEDICAL CENTER | Age: 32
End: 2022-10-24
Attending: EMERGENCY MEDICINE
Payer: MEDICARE

## 2022-10-24 ENCOUNTER — APPOINTMENT (OUTPATIENT)
Dept: RADIOLOGY | Facility: MEDICAL CENTER | Age: 32
End: 2022-10-24
Attending: EMERGENCY MEDICINE
Payer: MEDICARE

## 2022-10-24 VITALS
HEIGHT: 63 IN | OXYGEN SATURATION: 92 % | TEMPERATURE: 98.5 F | RESPIRATION RATE: 20 BRPM | BODY MASS INDEX: 44.3 KG/M2 | WEIGHT: 250 LBS | SYSTOLIC BLOOD PRESSURE: 159 MMHG | DIASTOLIC BLOOD PRESSURE: 80 MMHG | HEART RATE: 117 BPM

## 2022-10-24 DIAGNOSIS — J20.5 RSV BRONCHITIS: ICD-10-CM

## 2022-10-24 DIAGNOSIS — J45.41 MODERATE PERSISTENT ASTHMA WITH ACUTE EXACERBATION IN ADULT: ICD-10-CM

## 2022-10-24 LAB
ALBUMIN SERPL BCP-MCNC: 4.4 G/DL (ref 3.2–4.9)
ALBUMIN/GLOB SERPL: 1.6 G/DL
ALP SERPL-CCNC: 76 U/L (ref 30–99)
ALT SERPL-CCNC: 38 U/L (ref 2–50)
ANION GAP SERPL CALC-SCNC: 18 MMOL/L (ref 7–16)
AST SERPL-CCNC: 19 U/L (ref 12–45)
BASOPHILS # BLD AUTO: 0.5 % (ref 0–1.8)
BASOPHILS # BLD: 0.04 K/UL (ref 0–0.12)
BILIRUB SERPL-MCNC: 0.3 MG/DL (ref 0.1–1.5)
BUN SERPL-MCNC: 10 MG/DL (ref 8–22)
CALCIUM SERPL-MCNC: 9.7 MG/DL (ref 8.5–10.5)
CHLORIDE SERPL-SCNC: 106 MMOL/L (ref 96–112)
CO2 SERPL-SCNC: 14 MMOL/L (ref 20–33)
CREAT SERPL-MCNC: 0.74 MG/DL (ref 0.5–1.4)
EKG IMPRESSION: NORMAL
EOSINOPHIL # BLD AUTO: 0.07 K/UL (ref 0–0.51)
EOSINOPHIL NFR BLD: 0.9 % (ref 0–6.9)
ERYTHROCYTE [DISTWIDTH] IN BLOOD BY AUTOMATED COUNT: 41.1 FL (ref 35.9–50)
FLUAV RNA SPEC QL NAA+PROBE: NEGATIVE
FLUBV RNA SPEC QL NAA+PROBE: NEGATIVE
GFR SERPLBLD CREATININE-BSD FMLA CKD-EPI: 109 ML/MIN/1.73 M 2
GLOBULIN SER CALC-MCNC: 2.7 G/DL (ref 1.9–3.5)
GLUCOSE SERPL-MCNC: 190 MG/DL (ref 65–99)
HCT VFR BLD AUTO: 45 % (ref 37–47)
HGB BLD-MCNC: 15.9 G/DL (ref 12–16)
IMM GRANULOCYTES # BLD AUTO: 0.15 K/UL (ref 0–0.11)
IMM GRANULOCYTES NFR BLD AUTO: 1.8 % (ref 0–0.9)
LACTATE SERPL-SCNC: 4.6 MMOL/L (ref 0.5–2)
LYMPHOCYTES # BLD AUTO: 2.42 K/UL (ref 1–4.8)
LYMPHOCYTES NFR BLD: 29.5 % (ref 22–41)
MCH RBC QN AUTO: 31.9 PG (ref 27–33)
MCHC RBC AUTO-ENTMCNC: 35.3 G/DL (ref 33.6–35)
MCV RBC AUTO: 90.2 FL (ref 81.4–97.8)
MONOCYTES # BLD AUTO: 0.5 K/UL (ref 0–0.85)
MONOCYTES NFR BLD AUTO: 6.1 % (ref 0–13.4)
NEUTROPHILS # BLD AUTO: 5.03 K/UL (ref 2–7.15)
NEUTROPHILS NFR BLD: 61.2 % (ref 44–72)
NRBC # BLD AUTO: 0 K/UL
NRBC BLD-RTO: 0 /100 WBC
PLATELET # BLD AUTO: 169 K/UL (ref 164–446)
PMV BLD AUTO: 10.5 FL (ref 9–12.9)
POTASSIUM SERPL-SCNC: 3.8 MMOL/L (ref 3.6–5.5)
PROT SERPL-MCNC: 7.1 G/DL (ref 6–8.2)
RBC # BLD AUTO: 4.99 M/UL (ref 4.2–5.4)
RSV RNA SPEC QL NAA+PROBE: POSITIVE
SARS-COV-2 RNA RESP QL NAA+PROBE: NOTDETECTED
SODIUM SERPL-SCNC: 138 MMOL/L (ref 135–145)
SPECIMEN SOURCE: ABNORMAL
WBC # BLD AUTO: 8.2 K/UL (ref 4.8–10.8)

## 2022-10-24 PROCEDURE — 80053 COMPREHEN METABOLIC PANEL: CPT

## 2022-10-24 PROCEDURE — 700101 HCHG RX REV CODE 250: Performed by: EMERGENCY MEDICINE

## 2022-10-24 PROCEDURE — 85025 COMPLETE CBC W/AUTO DIFF WBC: CPT

## 2022-10-24 PROCEDURE — 83605 ASSAY OF LACTIC ACID: CPT

## 2022-10-24 PROCEDURE — 71045 X-RAY EXAM CHEST 1 VIEW: CPT

## 2022-10-24 PROCEDURE — 99285 EMERGENCY DEPT VISIT HI MDM: CPT

## 2022-10-24 PROCEDURE — 36415 COLL VENOUS BLD VENIPUNCTURE: CPT

## 2022-10-24 PROCEDURE — 700111 HCHG RX REV CODE 636 W/ 250 OVERRIDE (IP): Performed by: EMERGENCY MEDICINE

## 2022-10-24 PROCEDURE — 0241U HCHG SARS-COV-2 COVID-19 NFCT DS RESP RNA 4 TRGT MIC: CPT

## 2022-10-24 PROCEDURE — 93005 ELECTROCARDIOGRAM TRACING: CPT | Performed by: EMERGENCY MEDICINE

## 2022-10-24 PROCEDURE — C9803 HOPD COVID-19 SPEC COLLECT: HCPCS | Performed by: EMERGENCY MEDICINE

## 2022-10-24 PROCEDURE — 94644 CONT INHLJ TX 1ST HOUR: CPT

## 2022-10-24 PROCEDURE — 87040 BLOOD CULTURE FOR BACTERIA: CPT | Mod: 91

## 2022-10-24 PROCEDURE — 93005 ELECTROCARDIOGRAM TRACING: CPT

## 2022-10-24 PROCEDURE — 96365 THER/PROPH/DIAG IV INF INIT: CPT

## 2022-10-24 PROCEDURE — 96375 TX/PRO/DX INJ NEW DRUG ADDON: CPT

## 2022-10-24 RX ORDER — MAGNESIUM SULFATE HEPTAHYDRATE 40 MG/ML
2 INJECTION, SOLUTION INTRAVENOUS ONCE
Status: COMPLETED | OUTPATIENT
Start: 2022-10-24 | End: 2022-10-24

## 2022-10-24 RX ORDER — PREDNISONE 20 MG/1
TABLET ORAL
Qty: 24 TABLET | Refills: 0 | Status: SHIPPED | OUTPATIENT
Start: 2022-10-24 | End: 2022-11-10

## 2022-10-24 RX ORDER — METHYLPREDNISOLONE SODIUM SUCCINATE 125 MG/2ML
125 INJECTION, POWDER, LYOPHILIZED, FOR SOLUTION INTRAMUSCULAR; INTRAVENOUS ONCE
Status: COMPLETED | OUTPATIENT
Start: 2022-10-24 | End: 2022-10-24

## 2022-10-24 RX ORDER — LORAZEPAM 2 MG/ML
1 INJECTION INTRAMUSCULAR ONCE
Status: COMPLETED | OUTPATIENT
Start: 2022-10-24 | End: 2022-10-24

## 2022-10-24 RX ADMIN — METHYLPREDNISOLONE SODIUM SUCCINATE 125 MG: 125 INJECTION, POWDER, FOR SOLUTION INTRAMUSCULAR; INTRAVENOUS at 16:19

## 2022-10-24 RX ADMIN — ALBUTEROL SULFATE 15 MG/HR: 2.5 SOLUTION RESPIRATORY (INHALATION) at 16:03

## 2022-10-24 RX ADMIN — LORAZEPAM 1 MG: 2 INJECTION INTRAMUSCULAR; INTRAVENOUS at 17:37

## 2022-10-24 RX ADMIN — MAGNESIUM SULFATE HEPTAHYDRATE 2 G: 40 INJECTION, SOLUTION INTRAVENOUS at 16:54

## 2022-10-24 RX ADMIN — IPRATROPIUM BROMIDE 0.5 MG: 0.5 SOLUTION RESPIRATORY (INHALATION) at 16:03

## 2022-10-24 ASSESSMENT — FIBROSIS 4 INDEX: FIB4 SCORE: 0.49

## 2022-10-24 ASSESSMENT — LIFESTYLE VARIABLES: DO YOU DRINK ALCOHOL: NO

## 2022-10-24 NOTE — ED NOTES
----- Message from [facility] sent at 10/21/2022 11:24 AM EDT -----  Subject: Message to Provider    QUESTIONS  Information for Provider? Patient's wife asking for the number of the   infectious disease specialist they were referred to. Please call back with   more information   ---------------------------------------------------------------------------  --------------  [facility]  [phone]; OK to leave message on voicemail  ---------------------------------------------------------------------------  --------------  SCRIPT ANSWERS  Relationship to Patient? Other  Representative Name? Lu  Is the Representative on the appropriate HIPAA document in Epic?  Yes Pt resting in stretcher in NAD, denies needs or concerns. Will continue to monitor.

## 2022-10-24 NOTE — ED TRIAGE NOTES
Asthma exacerbation, family sick @ home.  With fevers, chills, soar throat.  Pt report sob of breath.  Nebs @ home not working.  Arrived on continuous nebulizer, initially on CPAP but now on neb mask.

## 2022-10-24 NOTE — ED PROVIDER NOTES
"ED Provider Note    CHIEF COMPLAINT  Chief Complaint   Patient presents with    Asthma     Asthma exacerbation, family sick @ home.  With fevers, chills, soar throat, sob of breath.  Arrived on continuous nebulizer, initially on CPAP but now on neb mask.         HPI  Kristin Balderrama is a 32 y.o. female who presents via ambulance for evaluation of shortness of breath, she reports his current asthma exacerbation began just today although she has been having viral URI symptoms over the past week or so, was seen in the emergency department evaluate for this prior.  She states multiple family members at home are sick.  She has had a sore throat and coughing and congestion and fever.  She received a DuoNeb treatment on the ambulance, reports ongoing symptoms.  There was a period of CPAP on the ambulance.  She feels tightness in her chest.  No vomiting.  No abdominal pain.  No new back pain.  Extensive past medical history which includes psychiatric disease, diabetes, and PCOS.  Given her acute respiratory distress further history is unavailable at this time    REVIEW OF SYSTEMS  Negative for rash, vomiting, focal weakness, focal numbness, focal tingling, back pain. All other systems are negative.     PAST MEDICAL HISTORY  Past Medical History:   Diagnosis Date    Abdominal pain     Anginal syndrome     random chest pain especially with tachycardia    Apnea, sleep     Arrhythmia     \"sinus tachycardia\", cariologist, Dr. Kumar; ablation 2/2016    Arthritis     osteo    ASTHMA     when around smoke    Back pain     Borderline personality disorder (HCC)     Breath shortness     with tachycardia    Bronchitis 02/08/2022    Last time was 12/21    Cardiac arrhythmia     Chickenpox     Chronic UTI 09/18/2010    Cough     Daytime sleepiness     Dental disorder 03/08/2021    infected tooth    Depression     Diabetes (HCC)     Diarrhea     incontinece    Disorder of thyroid     Hashimoto's    Fall     Fatigue     Frequent " "headaches     Gasping for breath     Gynecological disorder     PCOS    Headache(784.0)     Heart burn     Heart murmur     History of falling     Indigestion     Migraine     Mitochondrial disease (HCC)     Multiple personality disorder (HCC)     Nausea     Obesity     Other fatigue 06/29/2020    Pain 08/15/2012    back, 7/10    Painful joint     PCOS (polycystic ovarian syndrome)     Pneumonia 2012 12/2020    Psychosis (HCC)     Ringing in ears     Scoliosis     Shortness of breath     O2 as needed    Sinus tachycardia 10/31/2013    Sleep apnea     CPAP \"pulmonary doctor took me off mid year 2016\"    Snoring     Tonsillitis     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HCC)     2/8/22: Per pt: not anymore    Tuberculosis     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    Urinary bladder disorder     Suprapubic cath. 2/8/22: Not anymore.     Urinary incontinence     Weakness     Wears glasses        FAMILY HISTORY  Family History   Problem Relation Age of Onset    Hypertension Mother     Sleep Apnea Mother     Heart Disease Mother     Other Mother         hypothryod    Hypertension Maternal Uncle     Heart Disease Maternal Grandmother     Hypertension Maternal Grandmother     No Known Problems Sister     Other Sister         Narcolepsy;fibromyalsia;bone;nerve    Genitourinary () Problems Sister         endometriosis       SOCIAL HISTORY  Social History     Tobacco Use    Smoking status: Never    Smokeless tobacco: Never   Vaping Use    Vaping Use: Never used   Substance Use Topics    Alcohol use: No     Alcohol/week: 0.0 oz    Drug use: Not Currently     Frequency: 7.0 times per week     Types: Marijuana       SURGICAL HISTORY  Past Surgical History:   Procedure Laterality Date    [place] LAP,DIAGNOSTIC ABDOMEN  2/14/2022    Procedure: LAPAROSCOPY;  Surgeon: Seamus Pisano M.D.;  Location: SURGERY SAME DAY [facility];  Service: Gynecology    OVARIAN CYSTECTOMY Right 2/14/2022    Procedure: EXCISION, CYST, OVARY;  Surgeon: Seamus LIN" RUFUS Pisano;  Location: SURGERY SAME DAY [facility];  Service: Gynecology    BOWEL STIMULATOR INSERTION  3/10/2021    Procedure: INSERTION, ELECTRODE LEADS AND PULSE GENERATOR, NEUROSTIMULATOR, SACRAL - REMOVAL OF INTERSTIM WITH REPLACEMENT OF SACRAL NEUROMODULATION DEVICE;  Surgeon: Joe Noyola M.D.;  Location: SURGERY [facility];  Service: General    MUSCLE BIOPSY Right 1/26/2017    Procedure: MUSCLE BIOPSY - THIGH;  Surgeon: Isidro Vigil M.D.;  Location: SURGERY [facility];  Service:     GASTROSCOPY WITH BALLOON DILATATION N/A 1/4/2017    Procedure: GASTROSCOPY WITH DILATATION;  Surgeon: Torres Vargas M.D.;  Location: SURGERY [facility];  Service:     BOWEL STIMULATOR INSERTION  7/13/2016    Procedure: BOWEL STIMULATOR INSERTION FOR PERMANENT INTERSTIM SACRAL IMPLANT;  Surgeon: Joe Noyola M.D.;  Location: SURGERY [facility];  Service:     RECOVERY  1/27/2016    Procedure: CATH LAB EP STUDY WITH SINUS NODE MODIFICATION LA;  Surgeon: [facility];  Location: SURGERY [facility] [place] [facility];  Service:     OTHER CARDIAC SURGERY  1/2016    cardiac ablation    NEURO DEST FACET L/S W/IG SNGL  4/21/2015    Performed by Reza Tabor at [facility]    LUMBAR FUSION ANTERIOR  8/21/2012    Performed by SUSIE SAWANT at SURGERY [facility] ORS    ALYSSA BY LAPAROSCOPY  8/29/2010    Performed by SHAYY JOHNS at SURGERY [facility] ORS    LAMINOTOMY      OTHER ABDOMINAL SURGERY      TONSILLECTOMY      tonsillectomy       CURRENT MEDICATIONS  I personally reviewed the medication list in the charting documentation.     ALLERGIES  Allergies   Allergen Reactions    Depakote [Divalproex Sodium] Unspecified     Muscle spasms/muscle pain and weakness      Doxycycline Anaphylaxis and Vomiting     Other reaction(s): pustules/blisters    Montelukast [Singulair] Unspecified     Cardiac effusion    Vancomycin Itching     Pt becomes flushed in face and chest.   RXN=7/10/16    Amitriptyline  "Unspecified     Headaches      Aripiprazole [Abilify] Unspecified     Headaches/muscle twitching      Clindamycin Nausea           Flomax [Tamsulosin Hydrochloride] Swelling    Metformin Unspecified     Increased lactic acid     Other reaction(s): itching and rash/nausea vomiting    Tamsulosin Swelling     Swelling of legs    Tape Rash     Tears skin off  coban with Tegaderm tape ok intermittently  RXN=ongoing    Wound Dressing Adhesive Hives     By pt report    Ampicillin Rash     Pt reports that she received a rash     Ciprofloxacin Rash          Keflex Rash     Pt states she gets a rash with this medication  Tolerates ceftriaxone  Can take with Benadryl    Levofloxacin Unspecified     Leg muscle cramps    Metronidazole Rash     \"Vision problems\"    Penicillins Hives     Can take with Benadryl    Sulfa Drugs Itching and Myalgia     Muscle pain and weakness    Valproic Acid Rash     .       MEDICAL RECORD  I have reviewed patient's medical record and pertinent results are listed above.      PHYSICAL EXAM  VITAL SIGNS: BP (!) 149/92   Pulse (!) 127   Temp 37.2 °C (99 °F) (Temporal)   Resp 20   Ht 1.6 m (5' 3\")   Wt 113 kg (250 lb)   SpO2 98%   BMI 44.29 kg/m²    Constitutional: Acute respiratory distress with audible wheezing tachycardic rate  HENT: Normocephalic, no obvious evidence of acute trauma.  Eyes: No scleral icterus. Normal conjunctiva   Neck: Comfortable movement without any obvious restriction in the range of motion.  Cardiovascular: Upon ascultation I appreciate a regular heart rhythm and a normal rate.   Thorax & Lungs: Respiratory distress, diffuse inspiratory and expiratory wheezes  Abdomen: The abdomen is not visibly distended. Upon palpation, I find it to be without tenderness.  No mass appreciated.  Skin: The exposed portions of skin reveal no obvious rash or other abnormalities.  Extremities/Musculoskeletal: No obvious sign of acute trauma. No asymmetric calf tenderness or edema. "   Neurologic: Alert & oriented. No focal deficits observed.     DIAGNOSTIC STUDIES / PROCEDURES    LABS/EKGs     Results for orders placed or performed during the hospital encounter of 10/24/22   CBC With Differential   Result Value Ref Range    WBC 8.2 4.8 - 10.8 K/uL    RBC 4.99 4.20 - 5.40 M/uL    Hemoglobin 15.9 12.0 - 16.0 g/dL    Hematocrit 45.0 37.0 - 47.0 %    MCV 90.2 81.4 - 97.8 fL    MCH 31.9 27.0 - 33.0 pg    MCHC 35.3 (H) 33.6 - 35.0 g/dL    RDW 41.1 35.9 - 50.0 fL    Platelet Count 169 164 - 446 K/uL    MPV 10.5 9.0 - 12.9 fL    Neutrophils-Polys 61.20 44.00 - 72.00 %    Lymphocytes 29.50 22.00 - 41.00 %    Monocytes 6.10 0.00 - 13.40 %    Eosinophils 0.90 0.00 - 6.90 %    Basophils 0.50 0.00 - 1.80 %    Immature Granulocytes 1.80 (H) 0.00 - 0.90 %    Nucleated RBC 0.00 /100 WBC    Neutrophils (Absolute) 5.03 2.00 - 7.15 K/uL    Lymphs (Absolute) 2.42 1.00 - 4.80 K/uL    Monos (Absolute) 0.50 0.00 - 0.85 K/uL    Eos (Absolute) 0.07 0.00 - 0.51 K/uL    Baso (Absolute) 0.04 0.00 - 0.12 K/uL    Immature Granulocytes (abs) 0.15 (H) 0.00 - 0.11 K/uL    NRBC (Absolute) 0.00 K/uL   Comp Metabolic Panel   Result Value Ref Range    Sodium 138 135 - 145 mmol/L    Potassium 3.8 3.6 - 5.5 mmol/L    Chloride 106 96 - 112 mmol/L    Co2 14 (L) 20 - 33 mmol/L    Anion Gap 18.0 (H) 7.0 - 16.0    Glucose 190 (H) 65 - 99 mg/dL    Bun 10 8 - 22 mg/dL    Creatinine 0.74 0.50 - 1.40 mg/dL    Calcium 9.7 8.5 - 10.5 mg/dL    AST(SGOT) 19 12 - 45 U/L    ALT(SGPT) 38 2 - 50 U/L    Alkaline Phosphatase 76 30 - 99 U/L    Total Bilirubin 0.3 0.1 - 1.5 mg/dL    Albumin 4.4 3.2 - 4.9 g/dL    Total Protein 7.1 6.0 - 8.2 g/dL    Globulin 2.7 1.9 - 3.5 g/dL    A-G Ratio 1.6 g/dL   Lactic Acid   Result Value Ref Range    Lactic Acid 4.6 (HH) 0.5 - 2.0 mmol/L   CoV-2, FLU A/B, and RSV by PCR (2-4 Hours CEPHEID) : Collect NP swab in VTM    Specimen: Respirate   Result Value Ref Range    Influenza virus A RNA Negative Negative    Influenza  virus B, PCR Negative Negative    RSV, PCR POSITIVE (A) Negative    SARS-CoV-2 by PCR NotDetected     SARS-CoV-2 Source NP Swab    ESTIMATED GFR   Result Value Ref Range    GFR (CKD-EPI) 109 >60 mL/min/1.73 m 2   EKG   Result Value Ref Range    Report       [facility] Emergency Dept.    Test Date:  2022-10-24  Pt Name:    HARLEY DE LA CRUZ              Department: ER  MRN:        [id number]                      Room:        05  Gender:     Female                       Technician: [id number]  :        1990                   Requested By:ER TRIAGE PROTOCOL  Order #:    [id number]                    Reading MD: DENNIS REGALADO MD    Measurements  Intervals                                Axis  Rate:       129                          P:          43  AR:         126                          QRS:        4  QRSD:       97                           T:          229  QT:         281  QTc:        412    Interpretive Statements  12 Lead EKG interpreted by me to show: -- Rate 129 -- Rhythm: Sinus Tachy --  Axis: Normal -- AR and QRS Intervals: Normal -- T waves: No acute changes --  ST  segments: No acute changes -- Ectopy: None. My impression of this EKG: Does  not  indicate acute ischemia at this time.  Electronically Signed On  10- 19:23:58 PDT by DENNIS REGALADO MD       *Note: Due to a large number of results and/or encounters for the requested time period, some results have not been displayed. A complete set of results can be found in Results Review.        RADIOLOGY     DX-CHEST-LIMITED (1 VIEW)   Final Result      No acute cardiopulmonary disease.            COURSE & MEDICAL DECISION MAKING  I have reviewed any medical record information, laboratory studies and radiographic results as noted above.  Differential diagnoses includes: Asthma with exacerbation, pneumonia, pneumothorax, Covid-19    Encounter Summary: This is a very pleasant 32 y.o. female who unfortunately required evaluation in  the emergency department today with acute respiratory distress, history of asthma, has had a viral-like illness over the past week or longer, states today her breathing became very labored.  EMS found her to be in quite a degree of distress, they administer nebulizer treatments and put her on CPAP.  She arrives in distress.  She is not hypoxic, she is very tachypneic and tachycardic and tight in all lung fields.  I was called to emergently evaluate her upon her arrival.  Respiratory was contacted and she is being administered along breathing treatment.  She will receive magnesium.  She will receive Solu-Medrol.  A chest x-ray, blood work and COVID swab will be obtained and ultimately she will be reevaluated -------- chest x-ray is clear.  After the breathing treatment her wheezing completely resolved although she did have some induced wheezing like sounds from her upper airway that was distractible.  She was never hypoxic.  She was persistently tachycardic but received a large amount of albuterol.  Her lactic acid was also little bit elevated.  She received IV fluids here in the emergency department and did have a reduction in her heart rate albeit not normal.  Her COVID test was negative but she did test positive for RSV which was part of the COVID and influenza test.  At this point, monitor treat her with steroids but I do not feel as though she needs hospitalization as she appears well, she is not hypoxic.  She will follow-up with her PCP.  Strict return instructions have been discussed      DISPOSITION: Discharged home in stable condition      FINAL IMPRESSION  1. Moderate persistent asthma with acute exacerbation in adult    2. RSV bronchitis           This dictation was created using voice recognition software. The accuracy of the dictation is limited to the abilities of the software. I expect there may be some errors of grammar and possibly content. The nursing notes were reviewed and certain aspects of this  information were incorporated into this note.    Electronically signed by: Pedrito Bhatti M.D., 10/24/2022 4:11 PM

## 2022-10-25 NOTE — ED NOTES
Continuous neb completed from RT.  Pt appears somewhat more comfortable.  Minimal audible wheezing.  PT report feeling tired.  Pt still tachycardic.

## 2022-10-25 NOTE — ED NOTES
[facility] Discharge instructions given and pt has no follow up questions. Vitals and condition stable for discharge

## 2022-10-25 NOTE — ED NOTES
PT calmer at this time.  No longer having audible wheezing.  Still tachycardiac.  MD notified.  1L NS hanging per MD request

## 2022-10-25 NOTE — ED NOTES
Assumed care of pt, received report from SONYA Bateman. Safety rounds completed, pt resting comfortably in the room.

## 2022-10-25 NOTE — ED NOTES
PT arrives via EMS with complaints of worsening SOB.  HX of Asthma.  Use of neb treatment @ home unsuccessfully.  PT is Alert.  1 one word sentences.  Tachypnea.   Tachycardiac.  Pale.  Warm.   Sitting upright.  EMS reports given 2 renetta treatments in route with CPAP.  EMS did not report initial O2 sat @ home before interventions.  Pt arrives with continuous renetta on 6L sat @ 98.  PT reports family at home have been sick which running nose, dizziness, fever,  Cough. RR Called.  MD notified

## 2022-11-05 ENCOUNTER — APPOINTMENT (OUTPATIENT)
Dept: RADIOLOGY | Facility: MEDICAL CENTER | Age: 32
End: 2022-11-05
Payer: MEDICARE

## 2022-11-05 ENCOUNTER — HOSPITAL ENCOUNTER (EMERGENCY)
Facility: MEDICAL CENTER | Age: 32
End: 2022-11-05
Attending: EMERGENCY MEDICINE
Payer: MEDICARE

## 2022-11-05 VITALS
OXYGEN SATURATION: 96 % | TEMPERATURE: 96.7 F | WEIGHT: 242.51 LBS | SYSTOLIC BLOOD PRESSURE: 121 MMHG | HEART RATE: 86 BPM | BODY MASS INDEX: 41.4 KG/M2 | DIASTOLIC BLOOD PRESSURE: 60 MMHG | HEIGHT: 64 IN | RESPIRATION RATE: 14 BRPM

## 2022-11-05 DIAGNOSIS — R10.31 RLQ ABDOMINAL PAIN: ICD-10-CM

## 2022-11-05 DIAGNOSIS — K40.90 RIGHT INGUINAL HERNIA: ICD-10-CM

## 2022-11-05 LAB
ALBUMIN SERPL BCP-MCNC: 4.8 G/DL (ref 3.2–4.9)
ALBUMIN/GLOB SERPL: 1.9 G/DL
ALP SERPL-CCNC: 68 U/L (ref 30–99)
ALT SERPL-CCNC: 35 U/L (ref 2–50)
ANION GAP SERPL CALC-SCNC: 15 MMOL/L (ref 7–16)
APPEARANCE UR: ABNORMAL
AST SERPL-CCNC: 26 U/L (ref 12–45)
BACTERIA #/AREA URNS HPF: NEGATIVE /HPF
BASOPHILS # BLD AUTO: 0.4 % (ref 0–1.8)
BASOPHILS # BLD: 0.03 K/UL (ref 0–0.12)
BILIRUB SERPL-MCNC: 0.7 MG/DL (ref 0.1–1.5)
BILIRUB UR QL STRIP.AUTO: NEGATIVE
BUN SERPL-MCNC: 11 MG/DL (ref 8–22)
CALCIUM SERPL-MCNC: 10.1 MG/DL (ref 8.5–10.5)
CHLORIDE SERPL-SCNC: 107 MMOL/L (ref 96–112)
CO2 SERPL-SCNC: 16 MMOL/L (ref 20–33)
COLOR UR: YELLOW
CREAT SERPL-MCNC: 0.63 MG/DL (ref 0.5–1.4)
EOSINOPHIL # BLD AUTO: 0.13 K/UL (ref 0–0.51)
EOSINOPHIL NFR BLD: 1.7 % (ref 0–6.9)
EPI CELLS #/AREA URNS HPF: ABNORMAL /HPF
ERYTHROCYTE [DISTWIDTH] IN BLOOD BY AUTOMATED COUNT: 40.5 FL (ref 35.9–50)
GFR SERPLBLD CREATININE-BSD FMLA CKD-EPI: 120 ML/MIN/1.73 M 2
GLOBULIN SER CALC-MCNC: 2.5 G/DL (ref 1.9–3.5)
GLUCOSE SERPL-MCNC: 119 MG/DL (ref 65–99)
GLUCOSE UR STRIP.AUTO-MCNC: NEGATIVE MG/DL
HCG SERPL QL: NEGATIVE
HCT VFR BLD AUTO: 44.1 % (ref 37–47)
HGB BLD-MCNC: 15.2 G/DL (ref 12–16)
HYALINE CASTS #/AREA URNS LPF: ABNORMAL /LPF
IMM GRANULOCYTES # BLD AUTO: 0.03 K/UL (ref 0–0.11)
IMM GRANULOCYTES NFR BLD AUTO: 0.4 % (ref 0–0.9)
KETONES UR STRIP.AUTO-MCNC: 15 MG/DL
LEUKOCYTE ESTERASE UR QL STRIP.AUTO: NEGATIVE
LIPASE SERPL-CCNC: 40 U/L (ref 11–82)
LYMPHOCYTES # BLD AUTO: 2.28 K/UL (ref 1–4.8)
LYMPHOCYTES NFR BLD: 30.3 % (ref 22–41)
MCH RBC QN AUTO: 31 PG (ref 27–33)
MCHC RBC AUTO-ENTMCNC: 34.5 G/DL (ref 33.6–35)
MCV RBC AUTO: 89.8 FL (ref 81.4–97.8)
MICRO URNS: ABNORMAL
MONOCYTES # BLD AUTO: 0.46 K/UL (ref 0–0.85)
MONOCYTES NFR BLD AUTO: 6.1 % (ref 0–13.4)
NEUTROPHILS # BLD AUTO: 4.59 K/UL (ref 2–7.15)
NEUTROPHILS NFR BLD: 61.1 % (ref 44–72)
NITRITE UR QL STRIP.AUTO: NEGATIVE
NRBC # BLD AUTO: 0 K/UL
NRBC BLD-RTO: 0 /100 WBC
PH UR STRIP.AUTO: 5 [PH] (ref 5–8)
PLATELET # BLD AUTO: 255 K/UL (ref 164–446)
PMV BLD AUTO: 10.9 FL (ref 9–12.9)
POTASSIUM SERPL-SCNC: 4.3 MMOL/L (ref 3.6–5.5)
PROT SERPL-MCNC: 7.3 G/DL (ref 6–8.2)
PROT UR QL STRIP: NEGATIVE MG/DL
RBC # BLD AUTO: 4.91 M/UL (ref 4.2–5.4)
RBC # URNS HPF: ABNORMAL /HPF
RBC UR QL AUTO: NEGATIVE
SODIUM SERPL-SCNC: 138 MMOL/L (ref 135–145)
SP GR UR STRIP.AUTO: 1.03
UROBILINOGEN UR STRIP.AUTO-MCNC: 0.2 MG/DL
WBC # BLD AUTO: 7.5 K/UL (ref 4.8–10.8)
WBC #/AREA URNS HPF: ABNORMAL /HPF

## 2022-11-05 PROCEDURE — 700111 HCHG RX REV CODE 636 W/ 250 OVERRIDE (IP)

## 2022-11-05 PROCEDURE — 96374 THER/PROPH/DIAG INJ IV PUSH: CPT

## 2022-11-05 PROCEDURE — 99285 EMERGENCY DEPT VISIT HI MDM: CPT

## 2022-11-05 PROCEDURE — 85025 COMPLETE CBC W/AUTO DIFF WBC: CPT

## 2022-11-05 PROCEDURE — 83690 ASSAY OF LIPASE: CPT

## 2022-11-05 PROCEDURE — 84703 CHORIONIC GONADOTROPIN ASSAY: CPT

## 2022-11-05 PROCEDURE — 74019 RADEX ABDOMEN 2 VIEWS: CPT

## 2022-11-05 PROCEDURE — 36415 COLL VENOUS BLD VENIPUNCTURE: CPT

## 2022-11-05 PROCEDURE — 81001 URINALYSIS AUTO W/SCOPE: CPT

## 2022-11-05 PROCEDURE — 700105 HCHG RX REV CODE 258

## 2022-11-05 PROCEDURE — 80053 COMPREHEN METABOLIC PANEL: CPT

## 2022-11-05 RX ORDER — MORPHINE SULFATE 4 MG/ML
4 INJECTION INTRAVENOUS ONCE
Status: COMPLETED | OUTPATIENT
Start: 2022-11-05 | End: 2022-11-05

## 2022-11-05 RX ORDER — SODIUM CHLORIDE, SODIUM LACTATE, POTASSIUM CHLORIDE, CALCIUM CHLORIDE 600; 310; 30; 20 MG/100ML; MG/100ML; MG/100ML; MG/100ML
1000 INJECTION, SOLUTION INTRAVENOUS ONCE
Status: COMPLETED | OUTPATIENT
Start: 2022-11-05 | End: 2022-11-05

## 2022-11-05 RX ADMIN — MORPHINE SULFATE 4 MG: 4 INJECTION INTRAVENOUS at 13:15

## 2022-11-05 RX ADMIN — SODIUM CHLORIDE, POTASSIUM CHLORIDE, SODIUM LACTATE AND CALCIUM CHLORIDE 1000 ML: 600; 310; 30; 20 INJECTION, SOLUTION INTRAVENOUS at 13:15

## 2022-11-05 ASSESSMENT — FIBROSIS 4 INDEX: FIB4 SCORE: 0.6

## 2022-11-05 NOTE — ED PROVIDER NOTES
"ED Provider Note    Scribed for Joe Robertson M.D. by Sotero Mayfield. 11/5/2022  12:43 PM    Primary care provider: Torres Brody M.D.  Means of arrival: Walk-in  History obtained from: Patient  History limited by: None    CHIEF COMPLAINT  Chief Complaint   Patient presents with    Abdominal Pain       HPI  Kristin Balderrama is a 32 y.o. female who presents to the Emergency Department for worsened right lower quadrant pain onset this morning during a bowel movement. Patient has history of inguinal hernia for about 4 months now. She has not had a hernia repair. She was told by her surgeon to visit the ED if she experienced an acute onset of pain. This morning, patient's pain worsened this morning during a bowel movement. She rates her pain as a 9/10 and states she noticed a bulge in her inguinal area, that has since resolved. Patient endorses associated nausea, but denies any vomiting. Patient has history of severe recent RSV infection with cough and asthma.    Patient states her pain is worse compared to when she first develop the hernia. She states she has had RSV for the past 3.5 weeks. She notes bowel movements significantly exacerbate her abdominal pain. She endorses associated diarrhea (3x today), fever, chills, but no vomiting. She also notes blood in urine due to her kidney stones, but notes this pain is different from he kidney stones. She notes history of back surgery and cholecystectomy.    REVIEW OF SYSTEMS  Pertinent positives include: right lower quadrant pain, nausea, diarrhea, fever, and chills.  Pertinent negatives include: vomiting.  10+ systems reviewed and negative.      PAST MEDICAL HISTORY  Past Medical History:   Diagnosis Date    Abdominal pain     Anginal syndrome     random chest pain especially with tachycardia    Apnea, sleep     Arrhythmia     \"sinus tachycardia\", cariologist, Dr. Kumar; ablation 2/2016    Arthritis     osteo    ASTHMA     when around smoke    Back pain     Borderline " "personality disorder (HCC)     Breath shortness     with tachycardia    Bronchitis 02/08/2022    Last time was 12/21    Cardiac arrhythmia     Chickenpox     Chronic UTI 09/18/2010    Cough     Daytime sleepiness     Dental disorder 03/08/2021    infected tooth    Depression     Diabetes (HCC)     Diarrhea     incontinece    Disorder of thyroid     Hashimoto's    Fall     Fatigue     Frequent headaches     Gasping for breath     Gynecological disorder     PCOS    Headache(784.0)     Heart burn     Heart murmur     History of falling     Indigestion     Migraine     Mitochondrial disease (HCC)     Multiple personality disorder (HCC)     Nausea     Obesity     Other fatigue 06/29/2020    Pain 08/15/2012    back, 7/10    Painful joint     PCOS (polycystic ovarian syndrome)     Pneumonia 2012 12/2020    Psychosis (HCC)     Ringing in ears     Scoliosis     Shortness of breath     O2 as needed    Sinus tachycardia 10/31/2013    Sleep apnea     CPAP \"pulmonary doctor took me off mid year 2016\"    Snoring     Tonsillitis     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HCC)     2/8/22: Per pt: not anymore    Tuberculosis     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    Urinary bladder disorder     Suprapubic cath. 2/8/22: Not anymore.     Urinary incontinence     Weakness     Wears glasses        FAMILY HISTORY  Family History   Problem Relation Age of Onset    Hypertension Mother     Sleep Apnea Mother     Heart Disease Mother     Other Mother         hypothryod    Hypertension Maternal Uncle     Heart Disease Maternal Grandmother     Hypertension Maternal Grandmother     No Known Problems Sister     Other Sister         Narcolepsy;fibromyalsia;bone;nerve    Genitourinary () Problems Sister         endometriosis       SOCIAL HISTORY  Social History     Tobacco Use    Smoking status: Never    Smokeless tobacco: Never   Vaping Use    Vaping Use: Never used   Substance Use Topics    Alcohol use: No     Alcohol/week: 0.0 oz    Drug use: Not " Currently     Frequency: 7.0 times per week     Types: Marijuana     Social History     Substance and Sexual Activity   Drug Use Not Currently    Frequency: 7.0 times per week    Types: Marijuana       SURGICAL HISTORY  Past Surgical History:   Procedure Laterality Date    KS LAP,DIAGNOSTIC ABDOMEN  2/14/2022    Procedure: LAPAROSCOPY;  Surgeon: Seamus Pisano M.D.;  Location: SURGERY SAME DAY [facility];  Service: Gynecology    OVARIAN CYSTECTOMY Right 2/14/2022    Procedure: EXCISION, CYST, OVARY;  Surgeon: Seamus Pisano M.D.;  Location: SURGERY SAME DAY [facility];  Service: Gynecology    BOWEL STIMULATOR INSERTION  3/10/2021    Procedure: INSERTION, ELECTRODE LEADS AND PULSE GENERATOR, NEUROSTIMULATOR, SACRAL - REMOVAL OF INTERSTIM WITH REPLACEMENT OF SACRAL NEUROMODULATION DEVICE;  Surgeon: Joe Noyola M.D.;  Location: [facility];  Service: General    MUSCLE BIOPSY Right 1/26/2017    Procedure: MUSCLE BIOPSY - THIGH;  Surgeon: Isidro Vigil M.D.;  Location: [facility];  Service:     GASTROSCOPY WITH BALLOON DILATATION N/A 1/4/2017    Procedure: GASTROSCOPY WITH DILATATION;  Surgeon: Torres Vargas M.D.;  Location: [facility];  Service:     BOWEL STIMULATOR INSERTION  7/13/2016    Procedure: BOWEL STIMULATOR INSERTION FOR PERMANENT INTERSTIM SACRAL IMPLANT;  Surgeon: Joe Noyola M.D.;  Location: [facility];  Service:     RECOVERY  1/27/2016    Procedure: CATH LAB EP STUDY WITH SINUS NODE MODIFICATION LA;  Surgeon: [facility];  Location: SURGERY PRE-POST PROC UNIT [facility];  Service:     OTHER CARDIAC SURGERY  1/2016    cardiac ablation    NEURO DEST FACET L/S W/IG SNGL  4/21/2015    Performed by Reza Tabor at [facility]    LUMBAR FUSION ANTERIOR  8/21/2012    Performed by SUSIE SAWANT at [facility] ORS    ALYSSA BY LAPAROSCOPY  8/29/2010    Performed by SHAYY JOHNS at [facility]S    LAMINOTOMY      OTHER  ABDOMINAL SURGERY      TONSILLECTOMY      tonsillectomy       CURRENT MEDICATIONS  Home Medications       Reviewed by Taty Simmons R.N. (Registered Nurse) on 11/05/22 at 1113  Med List Status: Partial     Medication Last Dose Status   albuterol 108 (90 Base) MCG/ACT Aero Soln inhalation aerosol  Active   Aspirin-Acetaminophen-Caffeine (EXCEDRIN MIGRAINE PO)  Active   docusate sodium (COLACE) 100 MG Cap  Active   etonogestrel (NEXPLANON) 68 MG Implant implant  Active   ivabradine (CORLANOR) 7.5 MG Tab tablet  Active   lactulose 20 GM/30ML Solution  Active   Melatonin 10 MG Tab  Active   naproxen (NAPROSYN) 500 MG Tab  Active   ondansetron (ZOFRAN ODT) 4 MG TABLET DISPERSIBLE  Active   predniSONE (DELTASONE) 20 MG Tab  Active   sulfamethoxazole-trimethoprim (BACTRIM DS) 800-160 MG tablet  Active   SUMAtriptan (IMITREX) 50 MG Tab  Active   traZODone (DESYREL) 100 MG Tab  Active   ziprasidone (GEODON) 40 MG Cap  Active                    ALLERGIES  Allergies   Allergen Reactions    Depakote [Divalproex Sodium] Unspecified     Muscle spasms/muscle pain and weakness      Doxycycline Anaphylaxis and Vomiting     Other reaction(s): pustules/blisters    Montelukast [Singulair] Unspecified     Cardiac effusion    Vancomycin Itching     Pt becomes flushed in face and chest.   RXN=7/10/16    Amitriptyline Unspecified     Headaches      Aripiprazole [Abilify] Unspecified     Headaches/muscle twitching      Clindamycin Nausea           Flomax [Tamsulosin Hydrochloride] Swelling    Metformin Unspecified     Increased lactic acid     Other reaction(s): itching and rash/nausea vomiting    Tamsulosin Swelling     Swelling of legs    Tape Rash     Tears skin off  coban with Tegaderm tape ok intermittently  RXN=ongoing    Wound Dressing Adhesive Hives     By pt report    Ampicillin Rash     Pt reports that she received a rash     Ciprofloxacin Rash          Keflex Rash     Pt states she gets a rash with this  "medication  Tolerates ceftriaxone  Can take with Benadryl    Levofloxacin Unspecified     Leg muscle cramps    Metronidazole Rash     \"Vision problems\"    Penicillins Hives     Can take with Benadryl    Sulfa Drugs Itching and Myalgia     Muscle pain and weakness    Valproic Acid Rash     .       PHYSICAL EXAM  VITAL SIGNS: /78   Pulse (!) 103   Temp 35.8 °C (96.5 °F) (Temporal)   Resp 16   Ht 1.626 m (5' 4\")   Wt 110 kg (242 lb 8.1 oz)   SpO2 97%   BMI 41.63 kg/m²     Constitutional: Well developed, Well nourished, obese, non-toxic appearance.  HENT: Normocephalic, atraumatic, bilateral external ears normal, wearing a mask.   Eyes: PERRLA, conjunctiva pink, no scleral icterus.   Cardiovascular: Regular rate and rhythm. No murmurs, rubs or gallops.  No dependent edema or calf tenderness  Respiratory: Lungs clear to auscultation bilaterally. No wheezes, rales, or rhonchi.  Abdominal:  RLQ tenderness. No signs of erythema or bulge in the inguinal region. Guarding to palpation to the right inguinal region, none on the left. Non distended. No rebound  Skin: No erythema, no rash.   Genitourinary: No costovertebral angle tenderness.   Musculoskeletal: no deformities.   Neurologic: Alert & oriented x 3, cranial nerves 2-12 intact by passive exam.  No focal deficit noted.  Psychiatric: Affect normal, Judgment normal, Mood normal.     DIFFERENTIAL DIAGNOSIS:  Inguinal hernia, Acute Appendicitis.    RADIOLOGY/PROCEDURES  SH-WHROOVO-6 VIEWS   Final Result      1.  No evidence of obstruction, perforation or ileus.        Radiologist interpretation have been reviewed by me.     LABORATORY:  Results for orders placed or performed during the hospital encounter of 11/05/22   CBC WITH DIFFERENTIAL   Result Value Ref Range    WBC 7.5 4.8 - 10.8 K/uL    RBC 4.91 4.20 - 5.40 M/uL    Hemoglobin 15.2 12.0 - 16.0 g/dL    Hematocrit 44.1 37.0 - 47.0 %    MCV 89.8 81.4 - 97.8 fL    MCH 31.0 27.0 - 33.0 pg    MCHC 34.5 33.6 - " 35.0 g/dL    RDW 40.5 35.9 - 50.0 fL    Platelet Count 255 164 - 446 K/uL    MPV 10.9 9.0 - 12.9 fL    Neutrophils-Polys 61.10 44.00 - 72.00 %    Lymphocytes 30.30 22.00 - 41.00 %    Monocytes 6.10 0.00 - 13.40 %    Eosinophils 1.70 0.00 - 6.90 %    Basophils 0.40 0.00 - 1.80 %    Immature Granulocytes 0.40 0.00 - 0.90 %    Nucleated RBC 0.00 /100 WBC    Neutrophils (Absolute) 4.59 2.00 - 7.15 K/uL    Lymphs (Absolute) 2.28 1.00 - 4.80 K/uL    Monos (Absolute) 0.46 0.00 - 0.85 K/uL    Eos (Absolute) 0.13 0.00 - 0.51 K/uL    Baso (Absolute) 0.03 0.00 - 0.12 K/uL    Immature Granulocytes (abs) 0.03 0.00 - 0.11 K/uL    NRBC (Absolute) 0.00 K/uL   COMP METABOLIC PANEL   Result Value Ref Range    Sodium 138 135 - 145 mmol/L    Potassium 4.3 3.6 - 5.5 mmol/L    Chloride 107 96 - 112 mmol/L    Co2 16 (L) 20 - 33 mmol/L    Anion Gap 15.0 7.0 - 16.0    Glucose 119 (H) 65 - 99 mg/dL    Bun 11 8 - 22 mg/dL    Creatinine 0.63 0.50 - 1.40 mg/dL    Calcium 10.1 8.5 - 10.5 mg/dL    AST(SGOT) 26 12 - 45 U/L    ALT(SGPT) 35 2 - 50 U/L    Alkaline Phosphatase 68 30 - 99 U/L    Total Bilirubin 0.7 0.1 - 1.5 mg/dL    Albumin 4.8 3.2 - 4.9 g/dL    Total Protein 7.3 6.0 - 8.2 g/dL    Globulin 2.5 1.9 - 3.5 g/dL    A-G Ratio 1.9 g/dL   LIPASE   Result Value Ref Range    Lipase 40 11 - 82 U/L   URINALYSIS,CULTURE IF INDICATED    Specimen: Urine   Result Value Ref Range    Color Yellow     Character Cloudy (A)     Specific Gravity 1.026 <1.035    Ph 5.0 5.0 - 8.0    Glucose Negative Negative mg/dL    Ketones 15 (A) Negative mg/dL    Protein Negative Negative mg/dL    Bilirubin Negative Negative    Urobilinogen, Urine 0.2 Negative    Nitrite Negative Negative    Leukocyte Esterase Negative Negative    Occult Blood Negative Negative    Micro Urine Req Microscopic    HCG QUAL SERUM   Result Value Ref Range    Beta-Hcg Qualitative Serum Negative Negative   URINE MICROSCOPIC (W/UA)   Result Value Ref Range    WBC 0-2 /hpf    RBC 0-2 /hpf     Bacteria Negative None /hpf    Epithelial Cells Many (A) /hpf    Hyaline Cast 3-5 (A) /lpf   ESTIMATED GFR   Result Value Ref Range    GFR (CKD-EPI) 120 >60 mL/min/1.73 m 2     *Note: Due to a large number of results and/or encounters for the requested time period, some results have not been displayed. A complete set of results can be found in Results Review.      Lab results reviewed by me.     INTERVENTIONS:Indications IV Fluid: nausea  Medications   morphine 4 MG/ML injection 4 mg (4 mg Intravenous Given 11/5/22 1315)   lactated ringers (LR) bolus (0 mL Intravenous Stopped 11/5/22 1454)     Response:improved pain.    ED COURSE:  Nursing notes, VS, PMSFHx reviewed in chart.     12:43 PM - Patient seen and examined at bedside. Patient will be treated with morphine 4 mg and LR for her symptoms. Ordered Abdomen XR, Urine Microscopic, CBC w/ diff, CMP, Lipase, UA, Qual Serum HCG to evaluate.     12:48 PM - Patient was reevaluated at bedside. Discussed lab and radiology results with the patient and/or family.     2:434 PM - Patient was reevaluated at bedside. Discussed lab and radiology results with the patient and/or family.  The plan for discharge was discussed. Patient and/or family was given the opportunity to ask any questions. Patient and/or family verbalizes understanding and agreement to this plan of care.         MEDICAL DECISION MAKING:  Ms. Dasha Balderrama is a 32 year old female who presented with increased right lower quadrant abdominal pain. She has a known history of a right inguinal hernia, but has never had this much pain in that region in the past. Patient was told to come to the ER by Dr. Guzman if she had increased acute abdominal pain. On physical exam, hernia does not appear to be incarcerated, no erythema, or bulge noted. She has noted tenderness to palpation in region, with guarding.   Patient was noted to have no elevation in WBC. Some evidence of pain with elevated blood pressure and  "tachycardia. She recently was hospitalized at [facility] for RSV and acute asthmatic exacerbation, this may have caused inguinal hernia tear.     #Right inguinal hernia  #Abdominal pain, RLQ  #Asthma  Patient has had increased coughing over the past few weeks because of asthma exacerbation. This may have lead to increased abdominal muscle involvement and then increased stress on the inguinal hernia. Abdominal X-ray did not show any obstruction, ileus or perforation. Patient was given 1 liter of fluid for increased hyaline casts. Morphine was also given.     Plan:  -Tylenol and ibuprofen for abdomina pain  -Benefiber or MiraLAX for constipation  -Informed patient to return if there is a hard painful nodule in groin region; or for bloating/vomiting  -Follow up with surgeon      /60   Pulse 86   Temp 35.9 °C (96.7 °F) (Temporal)   Resp 14   Ht 1.626 m (5' 4\")   Wt 110 kg (242 lb 8.1 oz)   SpO2 96%   BMI 41.63 kg/m²     PLAN:    Right inguinal hernia, right lower quadrant abdominal pain handout given  Return for increased abdominal pain, bloating, vomiting or hard painful nodule in groin region.   Avoid opiates, MiraLAX or Benefiber for constipation  Ibuprofen and Tylenol for pain    Torres Brody M.D.  [address]  [facility] [id number] [phone]    Schedule an appointment as soon as possible for a visit   As needed    CONDITION: Good     FINAL IMPRESSION  1. RLQ abdominal pain    2. Right inguinal hernia          ISotero (Scribsadie), am scribing for, and in the presence of, Joe Robertson M.D..    Electronically signed by: Sotero Mayfield (Scribe), 11/5/2022    RUFUS Fletcher Mark E Baier, M.D. personally performed the services described in this documentation, as scribed by Sotero Mayfield in my presence, and it is both accurate and complete.    I independently evaluated the patient and repeated the important components of the history and physical. I discussed the " management with the resident. I have reviewed and agree with the pertinent clinical information above including history, exam, study findings, and recommendations.  This patient presents with right groin pain and has a known inguinal hernia without evidence of obstruction incarceration or strangulation.  This pain increased suddenly today while straining for a bowel movement and she likely tore the connective tissue at the hernia site more.  Appendicitis and ovarian cyst unlikely.  There is no pregnancy.  Further imaging is unlikely to .    Electronically signed by: Joe Robertson M.D., 11/5/2022 4:04 PM

## 2022-11-05 NOTE — DISCHARGE INSTRUCTIONS
Take ibuprofen and Tylenol for pain.  Take Benefiber or MiraLAX for constipation.  Avoid opiates.  Return for hard painful nodule in the groin that you cannot push back into the abdomen.  Return also for bloating or vomiting.  This is not expected.  If pains continue follow-up with your surgeon.

## 2022-11-05 NOTE — ED NOTES
Report received from SONYA Ritter. POC discussed. Patient remains on monitoring. VSS. Family at bedside. Offered blanket, declined.

## 2022-11-05 NOTE — ED TRIAGE NOTES
Vitals:    11/05/22 1102   BP: (!) 142/101   Pulse: (!) 119   Resp: 16   Temp: 35.8 °C (96.5 °F)   SpO2: 98%     Chief Complaint   Patient presents with    Abdominal Pain       Pt has a known inguinal hernia. It is nonsurgical until pt looses weight apparently. She was told by the surgeon Dr. Guzman to come to the ED if the pain increased which it has. Pt had two bowel movements today which caused pain in the RLQ where the hernia is located.     Pt is ambulatory to and from triage and is alert and oriented x 4.

## 2022-11-05 NOTE — ED NOTES
Pt ambulatory to [facility]. Pt changed into gown, placed on monitor with call light in reach. UA sent. Chart up for ERP

## 2022-11-07 NOTE — ED NOTES
Pt resting awaiting ERP.   Finasteride Counseling:  I discussed with the patient the risks of use of finasteride including but not limited to decreased libido, decreased ejaculate volume, gynecomastia, and depression. Women should not handle medication.  All of the patient's questions and concerns were addressed. Finasteride Male Counseling: Finasteride Counseling:  I discussed with the patient the risks of use of finasteride including but not limited to decreased libido, decreased ejaculate volume, gynecomastia, and depression. Women should not handle medication.  All of the patient's questions and concerns were addressed.

## 2022-11-08 NOTE — PROGRESS NOTES
Applied bilateral walking boots to patient.    Occupational Therapy    Visit Type: initial evaluation  Precautions:  Medical precautions:  fall risk; standard precautions.    11.7:  Pt admitted to [facility] from [facility] due to mental status change. Per neurology: suspect multifactorial encephalopathy with etiology likely medications with toxicity causing myoclonic type movements and some asterixis or CNS infection    -> 10T, PT/OT ordered    Therapist wearing gloves and surgical mask during session.  Patient was not wearing mask throughout duration of therapy session.  ROSENDO العلي present for session  Lines:     Basic: telemetry      Lines in chart and on patient reviewed, precautions maintained throughout session.  Safety Measures: bed alarm and bed rails  SUBJECTIVE  Patient agreed to participate in therapy this date.  \"No\"    Rehab aid Harpal present during session  Patient / Family Goal: return to previous functional status    Pain   RN informed on pain level  Face, Legs, Activity, Cry, Consolability Scale (FLACC)     Face: 0 - No particular expression or smile    Legs: 0 - Normal position or relaxed    Cry: 0 - No cry (awake or asleep)    Consolability: 0 - Content, relaxed     OBJECTIVE     Cognitive Status   Level of Consciousness   - alert  Affect/Behavior    - confused and cooperative  Orientation    - Unable to assess  Functional Communication   - Overall Status: impaired  Following Direction   - follows one step commands with increased time and follows one step commands with repetition   -  follows 1 step commands with 10% accuracy with minimal cues  Transition Between Tasks   - unable to transition  Memory   - impaired  Safety Awareness/Insight   - impaired, decreased awareness of need for assistance and decreased awareness of need for safety  Awareness of Deficits   - decreased awareness of deficits and assistance required to compensate for deficits  Verbal Expression   - impaired    Patient Activity Tolerance: 1 to 2 activity to  rest      Range of Motion (ROM)   (degrees unless noted; active unless noted; norms in ( ); negative=lacking to 0, positive=beyond 0)  Comments: Full passive UE ROM, 50% UE AROM    Strength  (out of 5 unless noted, standard test position unless noted)   Comments / Details: Overall strength 3/5      Sitting Balance  (AZUL = base of support)  Static      - Trial 1 details: maximal assist  Dynamic      - Trial 1 details: maximal assist      Coordination  Gross Motor:  LUE: gross arm placement/movement  RUE: gross arm placement/movement  LUE: impaired   RUE: impaired      Muscle Tone  LUE: hyper tonic  RUE: hyper tonic    Motor Planning: hand over hand to sequence tasks    Bed Mobility  - Rolling left: maximal assist, total assist - non-dependent, with verbal cues  - Rolling right: maximal assist, total assist - non-dependent, with verbal cues  - Supine to sit: total assist - non-dependent (MAX x2)  - Sit to supine: total assist - non-dependent (MAX x2)      Activities of Daily Living (ADLs)  Eating:   - Assist: total assist - non-dependent  Grooming/Oral Hygiene:   - Grooming assist: maximal assist  Lower Body Dressing:   - Assist: total assist - non-dependent  Toileting:   - Toilet transfer:        - Assist: not attempted due to not medically appropriate or safe  - Assist: total assist - non-dependent     Interventions    Training provided: activity tolerance, ADL training, balance retraining, body mechanics, breathing/relaxation, functional ambulation, postural re-education, neuromuscular reeducation and transfer trainingAttempted to transition from sit stand Pt using RW MAX A x2 but Pt was resisting leaning backwards, was unable to stand. Attempted to  Sabine Stedy MAX A x3 to perform toilet hygiene but Pt continued to resist. Pt tolerated bed mobility for toilet hygiene routine requiring MAX A x2 to roll from side to side. Pt was able to follow some commands but inconsistently. Pt educated on appropriate  activity level progression and RN educated on safe Pt handling techniques.     Skilled input: verbal instruction/cues and tactile instruction/cues  Verbal Consent: Writer verbally educated and received verbal consent for hand placement, positioning of patient, and techniques to be performed today from patient for therapist position for techniques and hand placement and palpation for techniques as described above and how they are pertinent to the patient's plan of care.         ASSESSMENT  Impairments: abnormal tone, balance, coordination/proprioception, range of motion, activity tolerance, bed mobility, cognitive, safety awareness, strength and aerobic capacity  Functional Limitations: bed mobility, functional mobility, grooming, dressing, toileting, bathing and functional transfers       Discharge Recommendations:  Recommendations for Discharge: OT IL: Patient is appropriate for daily Occupational Therapy  PT/OT ADL Equipment for Discharge: TBD    Therapy Dx: decreased ability to perform self care tasks and functional transfers.    • Skilled therapy is required to address these limitations in attempt to maximize the patient's independence.     • Personal Occupations Profile Affected: bathing/showering, personal hygiene/grooming, feeding, functional mobility/transfers, toileting/toilet hygiene, lower body dressing     • Clinical decision making: Low - Patient has few limitations (1-3), comorbidities and/or complexities, as noted in problem focused assessment noted above, that impact their occupational profile.  Resulting in few treatment options and no task modification consistent with low clinical decision making complexity.    Education:   - Present and ready to learn: patient  Education provided during session:  - Results of above outlined education: Needs reinforcement    Patient at End of Session:   Location: in bed  Safety measures: alarm system in place/re-engaged, call light within reach and bed rails  x4  Handoff to: nurse      I was in the immediate presence of the student and directed the student’s performance of the services. I am responsible for all treatment, assessment, documentation, and billing rendered for this patient.  Mia Lagunas OT        PLAN  Suggestions for next session as indicated: Frequency Comments: 1/3-5  ECF 11.8      A minimum of 8 minutes per session  Interventions: activity tolerance training, bed mobility training, energy conservation, functional transfer training, neuromuscular reeducation, safety training, therapeutic activity, ADL retraining, cognitive retraining, coordination, positioning, therapeutic exercise, use of adaptive equipment, balance, body mechanics, fine motor coordination activities, patient education and transfer training      GOALS  Long Term Goals: (to be met by time of discharge from hospital)  Grooming: Patient will complete grooming tasks in sitting minimal assist and without cues.  Upper body dressing: Patient will complete upper body dressing in sitting minimal assist and without cues.  Toilet transfer: Patient will complete toilet transfer with 2-wheeled walker, minimal assist and without cues.         Documented in the chart in the following areas: Assessment. Plan.      Therapy procedure time and total treatment time can be found documented on the Time Entry flowsheet

## 2022-11-09 ENCOUNTER — PATIENT MESSAGE (OUTPATIENT)
Dept: HEALTH INFORMATION MANAGEMENT | Facility: OTHER | Age: 32
End: 2022-11-09

## 2022-11-10 ENCOUNTER — APPOINTMENT (OUTPATIENT)
Dept: RADIOLOGY | Facility: MEDICAL CENTER | Age: 32
End: 2022-11-10
Attending: EMERGENCY MEDICINE
Payer: MEDICARE

## 2022-11-10 ENCOUNTER — OFFICE VISIT (OUTPATIENT)
Dept: CARDIOLOGY | Facility: MEDICAL CENTER | Age: 32
End: 2022-11-10
Payer: MEDICARE

## 2022-11-10 ENCOUNTER — TELEPHONE (OUTPATIENT)
Dept: CARDIOLOGY | Facility: MEDICAL CENTER | Age: 32
End: 2022-11-10

## 2022-11-10 ENCOUNTER — HOSPITAL ENCOUNTER (EMERGENCY)
Facility: MEDICAL CENTER | Age: 32
End: 2022-11-10
Attending: EMERGENCY MEDICINE
Payer: MEDICARE

## 2022-11-10 VITALS
RESPIRATION RATE: 20 BRPM | SYSTOLIC BLOOD PRESSURE: 132 MMHG | OXYGEN SATURATION: 97 % | DIASTOLIC BLOOD PRESSURE: 94 MMHG | BODY MASS INDEX: 43.05 KG/M2 | WEIGHT: 243 LBS | HEART RATE: 131 BPM | HEIGHT: 63 IN

## 2022-11-10 VITALS
BODY MASS INDEX: 41.48 KG/M2 | WEIGHT: 243 LBS | RESPIRATION RATE: 16 BRPM | TEMPERATURE: 97.9 F | DIASTOLIC BLOOD PRESSURE: 89 MMHG | HEART RATE: 93 BPM | OXYGEN SATURATION: 93 % | HEIGHT: 64 IN | SYSTOLIC BLOOD PRESSURE: 147 MMHG

## 2022-11-10 DIAGNOSIS — I47.11 INAPPROPRIATE SINUS TACHYCARDIA (HCC): ICD-10-CM

## 2022-11-10 DIAGNOSIS — R07.9 CHEST PAIN, UNSPECIFIED TYPE: ICD-10-CM

## 2022-11-10 DIAGNOSIS — I47.10 SVT (SUPRAVENTRICULAR TACHYCARDIA) (HCC): ICD-10-CM

## 2022-11-10 DIAGNOSIS — R00.2 PALPITATIONS: ICD-10-CM

## 2022-11-10 DIAGNOSIS — M35.7 HYPERMOBILITY SYNDROME: ICD-10-CM

## 2022-11-10 DIAGNOSIS — R07.2 CHEST PAIN, PRECORDIAL: ICD-10-CM

## 2022-11-10 LAB
ALBUMIN SERPL BCP-MCNC: 4.9 G/DL (ref 3.2–4.9)
ALBUMIN/GLOB SERPL: 2 G/DL
ALP SERPL-CCNC: 68 U/L (ref 30–99)
ALT SERPL-CCNC: 39 U/L (ref 2–50)
ANION GAP SERPL CALC-SCNC: 16 MMOL/L (ref 7–16)
AST SERPL-CCNC: 29 U/L (ref 12–45)
BASOPHILS # BLD AUTO: 0.7 % (ref 0–1.8)
BASOPHILS # BLD: 0.04 K/UL (ref 0–0.12)
BILIRUB SERPL-MCNC: 0.6 MG/DL (ref 0.1–1.5)
BUN SERPL-MCNC: 13 MG/DL (ref 8–22)
CALCIUM SERPL-MCNC: 10.3 MG/DL (ref 8.5–10.5)
CHLORIDE SERPL-SCNC: 102 MMOL/L (ref 96–112)
CO2 SERPL-SCNC: 18 MMOL/L (ref 20–33)
CREAT SERPL-MCNC: 0.74 MG/DL (ref 0.5–1.4)
EKG IMPRESSION: NORMAL
EKG IMPRESSION: NORMAL
EOSINOPHIL # BLD AUTO: 0.14 K/UL (ref 0–0.51)
EOSINOPHIL NFR BLD: 2.6 % (ref 0–6.9)
ERYTHROCYTE [DISTWIDTH] IN BLOOD BY AUTOMATED COUNT: 41.8 FL (ref 35.9–50)
GFR SERPLBLD CREATININE-BSD FMLA CKD-EPI: 109 ML/MIN/1.73 M 2
GLOBULIN SER CALC-MCNC: 2.4 G/DL (ref 1.9–3.5)
GLUCOSE SERPL-MCNC: 106 MG/DL (ref 65–99)
HCT VFR BLD AUTO: 43 % (ref 37–47)
HGB BLD-MCNC: 14.9 G/DL (ref 12–16)
IMM GRANULOCYTES # BLD AUTO: 0.01 K/UL (ref 0–0.11)
IMM GRANULOCYTES NFR BLD AUTO: 0.2 % (ref 0–0.9)
LYMPHOCYTES # BLD AUTO: 1.44 K/UL (ref 1–4.8)
LYMPHOCYTES NFR BLD: 26.9 % (ref 22–41)
MCH RBC QN AUTO: 31 PG (ref 27–33)
MCHC RBC AUTO-ENTMCNC: 34.7 G/DL (ref 33.6–35)
MCV RBC AUTO: 89.4 FL (ref 81.4–97.8)
MONOCYTES # BLD AUTO: 0.46 K/UL (ref 0–0.85)
MONOCYTES NFR BLD AUTO: 8.6 % (ref 0–13.4)
NEUTROPHILS # BLD AUTO: 3.26 K/UL (ref 2–7.15)
NEUTROPHILS NFR BLD: 61 % (ref 44–72)
NRBC # BLD AUTO: 0 K/UL
NRBC BLD-RTO: 0 /100 WBC
PLATELET # BLD AUTO: 185 K/UL (ref 164–446)
PMV BLD AUTO: 11.6 FL (ref 9–12.9)
POTASSIUM SERPL-SCNC: 4.1 MMOL/L (ref 3.6–5.5)
PROT SERPL-MCNC: 7.3 G/DL (ref 6–8.2)
RBC # BLD AUTO: 4.81 M/UL (ref 4.2–5.4)
SODIUM SERPL-SCNC: 136 MMOL/L (ref 135–145)
TROPONIN T SERPL-MCNC: <6 NG/L (ref 6–19)
WBC # BLD AUTO: 5.4 K/UL (ref 4.8–10.8)

## 2022-11-10 PROCEDURE — 94760 N-INVAS EAR/PLS OXIMETRY 1: CPT

## 2022-11-10 PROCEDURE — 36415 COLL VENOUS BLD VENIPUNCTURE: CPT

## 2022-11-10 PROCEDURE — 93005 ELECTROCARDIOGRAM TRACING: CPT | Performed by: EMERGENCY MEDICINE

## 2022-11-10 PROCEDURE — 84484 ASSAY OF TROPONIN QUANT: CPT

## 2022-11-10 PROCEDURE — 99215 OFFICE O/P EST HI 40 MIN: CPT | Performed by: INTERNAL MEDICINE

## 2022-11-10 PROCEDURE — 85025 COMPLETE CBC W/AUTO DIFF WBC: CPT

## 2022-11-10 PROCEDURE — 93000 ELECTROCARDIOGRAM COMPLETE: CPT | Performed by: INTERNAL MEDICINE

## 2022-11-10 PROCEDURE — 99285 EMERGENCY DEPT VISIT HI MDM: CPT

## 2022-11-10 PROCEDURE — 71045 X-RAY EXAM CHEST 1 VIEW: CPT

## 2022-11-10 PROCEDURE — 80053 COMPREHEN METABOLIC PANEL: CPT

## 2022-11-10 PROCEDURE — 93005 ELECTROCARDIOGRAM TRACING: CPT

## 2022-11-10 RX ORDER — IVABRADINE 7.5 MG/1
7.5 TABLET, FILM COATED ORAL 2 TIMES DAILY WITH MEALS
Qty: 180 TABLET | Refills: 3 | Status: ON HOLD | OUTPATIENT
Start: 2022-11-10 | End: 2023-02-09 | Stop reason: SDUPTHER

## 2022-11-10 ASSESSMENT — ENCOUNTER SYMPTOMS
PALPITATIONS: 1
COUGH: 0
SHORTNESS OF BREATH: 0
MYALGIAS: 0
LOSS OF CONSCIOUSNESS: 0
DIZZINESS: 0

## 2022-11-10 ASSESSMENT — FIBROSIS 4 INDEX
FIB4 SCORE: 0.57
FIB4 SCORE: 0.57

## 2022-11-10 NOTE — PROGRESS NOTES
"Chief Complaint   Patient presents with    Supraventricular Tachycardia (SVT)       Subjective     Kristin Balderrama is a 31 y.o. female who presents today for sinus tachycardia, ablation 2016 for sinus node modification for IST (Inappropriate Sinus Tachycardia) on chronic Corlanor therapy.    Since 5/12/2022 appointment the patient has been in the ER on several occasions for variety of reasons, recently hospitalized at [facility] for RSV placed on steroids, antibiotics discharged 11/27/2022, seen at [facility] for a \"torn inguinal hernia\" on 11/5/2022.  She subsequently developed bilateral ear infections pending ENT evaluation by Noman Carmen, ENT. Currently she comes in today for clinical follow-up and reports that since 9 AM today she has had a constant moderately severe \"7/10\" substernal nonpleuritic nonradiating diffuse diffuse \"pressure, bubble\" chest pain at times increasing to \"9/10\" feeling clammy with palpitations and mild dyspnea with an initial heart rate of 130.  EKG shows sinus tachycardia, rate 109 with no acute abnormalities.  She has had similar chest pain in the past that has resolved after couple hours.    A detailed review of her medications finds that she has only been taking Corlanor 7.5 mg once a day for over a year claims to have been told by a nurse practitioner that she was taking too much on a twice daily dose however on my review of the medical chart I cannot find a specific order that she reduce her dose to once a day.    Has been seen by John Ganser MD and is in the process of proceeding to gastric bypass/sleeve procedure    Additionally she states that genetic testing has returned with a profile suggesting hypermobility syndrome but no specific genetic defect to confirm EDS (Erler's Danlos syndrome)    Previously seen at [facility] ER in 3/2022 for chest pain symptoms, also seen at [facility] on 2/17/2022 for chest pain shortness of breath with negative chest CTA, normal EKG.  There has " "been mention that the patient had \"atrial fibrillation\" however there is been no documentation of that.        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      Past Medical History:   Diagnosis Date    Abdominal pain     Anginal syndrome     random chest pain especially with tachycardia    Apnea, sleep     Arrhythmia     \"sinus tachycardia\", cariologist, Dr. Kumar; ablation 2/2016    Arthritis     osteo    ASTHMA     when around smoke    Back pain     Borderline personality disorder (HCC)     Breath shortness     with tachycardia    Bronchitis 02/08/2022    Last time was 12/21    Cardiac arrhythmia     Chickenpox     Chronic UTI 09/18/2010    Cough     Daytime sleepiness     Dental disorder 03/08/2021    infected tooth    Depression     Diabetes (HCC)     Diarrhea     incontinece    Disorder of thyroid     Hashimoto's    Fall     Fatigue     Frequent headaches     Gasping for breath     Gynecological disorder     PCOS    Headache(784.0)     Heart burn     Heart murmur     History of falling     Indigestion     Migraine     Mitochondrial disease (HCC)     Multiple personality disorder (HCC)     Nausea     Obesity     Other fatigue 06/29/2020    Pain 08/15/2012    back, 7/10    Painful joint     PCOS (polycystic ovarian syndrome)     Pneumonia 2012 12/2020    Psychosis (HCC)     Ringing in ears     Scoliosis     Shortness of breath     O2 as needed    Sinus tachycardia 10/31/2013    Sleep apnea     CPAP \"pulmonary doctor took me " "off mid year 2016\"    Snoring     Tonsillitis     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HCC)     2/8/22: Per pt: not anymore    Tuberculosis     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    Urinary bladder disorder     Suprapubic cath. 2/8/22: Not anymore.     Urinary incontinence     Weakness     Wears glasses      Past Surgical History:   Procedure Laterality Date    UT LAP,DIAGNOSTIC ABDOMEN  2/14/2022    Procedure: LAPAROSCOPY;  Surgeon: Seamus Pisano M.D.;  Location: SURGERY SAME DAY [facility];  Service: Gynecology    OVARIAN CYSTECTOMY Right 2/14/2022    Procedure: EXCISION, CYST, OVARY;  Surgeon: Seamus Pisano M.D.;  Location: SURGERY SAME DAY [facility];  Service: Gynecology    BOWEL STIMULATOR INSERTION  3/10/2021    Procedure: INSERTION, ELECTRODE LEADS AND PULSE GENERATOR, NEUROSTIMULATOR, SACRAL - REMOVAL OF INTERSTIM WITH REPLACEMENT OF SACRAL NEUROMODULATION DEVICE;  Surgeon: Joe Noyola M.D.;  Location: [facility];  Service: General    MUSCLE BIOPSY Right 1/26/2017    Procedure: MUSCLE BIOPSY - THIGH;  Surgeon: Isidro Vigil M.D.;  Location: [facility];  Service:     GASTROSCOPY WITH BALLOON DILATATION N/A 1/4/2017    Procedure: GASTROSCOPY WITH DILATATION;  Surgeon: Torres Vargas M.D.;  Location: [facility];  Service:     BOWEL STIMULATOR INSERTION  7/13/2016    Procedure: BOWEL STIMULATOR INSERTION FOR PERMANENT INTERSTIM SACRAL IMPLANT;  Surgeon: Joe Noyola M.D.;  Location: [facility];  Service:     RECOVERY  1/27/2016    Procedure: CATH LAB EP STUDY WITH SINUS NODE MODIFICATION ABHINAV;  Surgeon: Recoveryonly Surgery;  Location: SURGERY PRE-POST PROC UNIT [facility];  Service:     OTHER CARDIAC SURGERY  1/2016    cardiac ablation    NEURO DEST FACET L/S W/IG SNGL  4/21/2015    Performed by Reza Tabor at [facility]    LUMBAR FUSION ANTERIOR  8/21/2012    Performed by SUSIE SAWANT at [facility]    ALYSSA BY LAPAROSCOPY  8/29/2010    " Performed by SHAYY JOHNS at SURGERY [facility] ORS    LAMINOTOMY      OTHER ABDOMINAL SURGERY      TONSILLECTOMY      tonsillectomy     Family History   Problem Relation Age of Onset    Hypertension Mother     Sleep Apnea Mother     Heart Disease Mother     Other Mother         hypothryod    Hypertension Maternal Uncle     Heart Disease Maternal Grandmother     Hypertension Maternal Grandmother     No Known Problems Sister     Other Sister         Narcolepsy;fibromyalsia;bone;nerve    Genitourinary () Problems Sister         endometriosis     Social History     Socioeconomic History    Marital status: Single     Spouse name: Not on file    Number of children: Not on file    Years of education: Not on file    Highest education level: Not on file   Occupational History    Not on file   Tobacco Use    Smoking status: Never    Smokeless tobacco: Never   Vaping Use    Vaping Use: Never used   Substance and Sexual Activity    Alcohol use: No     Alcohol/week: 0.0 oz    Drug use: Not Currently     Frequency: 7.0 times per week     Types: Marijuana    Sexual activity: Not Currently     Birth control/protection: Implant   Other Topics Concern    Not on file   Social History Narrative    ** Merged History Encounter **          Social Determinants of Health     Financial Resource Strain: Not on file   Food Insecurity: Not on file   Transportation Needs: Not on file   Physical Activity: Not on file   Stress: Not on file   Social Connections: Not on file   Intimate Partner Violence: Not on file   Housing Stability: Not on file     Allergies   Allergen Reactions    Depakote [Divalproex Sodium] Unspecified     Muscle spasms/muscle pain and weakness      Doxycycline Anaphylaxis and Vomiting     Other reaction(s): pustules/blisters    Montelukast [Singulair] Unspecified     Cardiac effusion    Vancomycin Itching     Pt becomes flushed in face and chest.   RXN=7/10/16    Amitriptyline Unspecified     Headaches       "Aripiprazole [Abilify] Unspecified     Headaches/muscle twitching      Clindamycin Nausea           Flomax [Tamsulosin Hydrochloride] Swelling    Metformin Unspecified     Increased lactic acid     Other reaction(s): itching and rash/nausea vomiting    Tamsulosin Swelling     Swelling of legs    Tape Rash     Tears skin off  coban with Tegaderm tape ok intermittently  RXN=ongoing    Wound Dressing Adhesive Hives     By pt report    Ampicillin Rash     Pt reports that she received a rash     Ciprofloxacin Rash          Keflex Rash     Pt states she gets a rash with this medication  Tolerates ceftriaxone  Can take with Benadryl    Levofloxacin Unspecified     Leg muscle cramps    Metronidazole Rash     \"Vision problems\"    Penicillins Hives     Can take with Benadryl    Sulfa Drugs Itching and Myalgia     Muscle pain and weakness    Valproic Acid Rash     .     Outpatient Encounter Medications as of 11/10/2022   Medication Sig Dispense Refill    diphenhydrAMINE HCl (BENADRYL ALLERGY PO) Take 50 mg by mouth every day.      Ipratropium-Albuterol (DUONEB INH) Inhale as needed.      Aspirin-Acetaminophen-Caffeine (EXCEDRIN MIGRAINE PO) Take 2 Tablets by mouth 2 times a day as needed (For headache).      etonogestrel (NEXPLANON) 68 MG Implant implant Inject 1 Each under the skin see administration instructions. Pt reports she is not sure when she had this medications injected last, pt reports she still has it in her arm  Every 3 years to change      docusate sodium (COLACE) 100 MG Cap Take 1 Capsule by mouth 2 times a day as needed for Constipation.      lactulose 20 GM/30ML Solution Take 30 mL by mouth 3 times a day as needed. Indications: Constipation      SUMAtriptan (IMITREX) 50 MG Tab Take 1 Tablet by mouth 1 time a day as needed for Migraine. 20 Tablet 0    ziprasidone (GEODON) 40 MG Cap Take 1 Capsule by mouth at bedtime.      ondansetron (ZOFRAN ODT) 4 MG TABLET DISPERSIBLE Take 1 Tablet by mouth every 6 hours as " "needed for Nausea. 10 Tablet 0    traZODone (DESYREL) 100 MG Tab Take 1 Tablet by mouth every evening.      ivabradine (CORLANOR) 7.5 MG Tab tablet Take 1 Tablet by mouth every evening.      Melatonin 10 MG Tab Take 10 mg by mouth every bedtime.      albuterol 108 (90 Base) MCG/ACT Aero Soln inhalation aerosol Inhale 2 Puffs every 6 hours as needed for Shortness of Breath. 8.5 g 6    [DISCONTINUED] predniSONE (DELTASONE) 20 MG Tab 3 tablets oral daily for 4 days followed by 2 tablets oral daily for 4 days followed by 1 tablet oral daily for 4 days (Patient not taking: Reported on 11/10/2022) 24 Tablet 0    [DISCONTINUED] naproxen (NAPROSYN) 500 MG Tab Take 1 Tablet by mouth 2 times a day with meals. (Patient not taking: Reported on 11/10/2022) 15 Tablet 0    [DISCONTINUED] sulfamethoxazole-trimethoprim (BACTRIM DS) 800-160 MG tablet Take 1 Tablet by mouth 2 times a day. Pt started on 8/15/2022 for 7 day course (Patient not taking: Reported on 11/10/2022)       No facility-administered encounter medications on file as of 11/10/2022.     Review of Systems   Respiratory:  Negative for cough and shortness of breath.    Cardiovascular:  Positive for chest pain and palpitations.   Musculoskeletal:  Negative for myalgias.   Neurological:  Negative for dizziness and loss of consciousness.            Objective     BP (!) 132/94 (BP Location: Left arm, Patient Position: Sitting, BP Cuff Size: Adult)   Pulse (!) 131   Resp 20   Ht 1.6 m (5' 3\")   Wt 110 kg (243 lb)   SpO2 97%   BMI 43.05 kg/m²     Physical Exam  Constitutional:       General: She is in acute distress.      Appearance: She is well-developed.      Comments: Ambulating with cane assistance  Dyspneic  Diaphoretic   Eyes:      Conjunctiva/sclera: Conjunctivae normal.      Pupils: Pupils are equal, round, and reactive to light.   Neck:      Vascular: No JVD.   Cardiovascular:      Rate and Rhythm: Regular rhythm. Tachycardia present.      Heart sounds: Normal " heart sounds.   Pulmonary:      Effort: Pulmonary effort is normal. No accessory muscle usage or respiratory distress.      Breath sounds: Normal breath sounds. No wheezing or rales.   Musculoskeletal:      Cervical back: Normal range of motion and neck supple.      Right lower leg: No edema.      Left lower leg: No edema.   Skin:     General: Skin is warm and dry.      Findings: No rash.      Nails: There is no clubbing.      Comments: Sweaty   Neurological:      Mental Status: She is alert and oriented to person, place, and time.   Psychiatric:         Behavior: Behavior normal.             MPI 4/6/2017  Baseline ECG:NSR, diffuse T wave flattening throughout  Stress ECG: No ischemic T wave changes with peak stress  Impression: Normal stress ECG, nuclear images pending   No myocardial infarct or inducible ischemia.   Normal LEFT ventricular function.    03/01/2017 ECHOCARDIOGRAM  Left ventricular ejection fraction 60%.  No valvular disease.     CHEST CTA 2/17/2022  1.  No evidence of pulmonary embolism.  2.  Trace bilateral pleural effusions.  3.  Clear lungs without consolidation.  4.  Ascending aorta normal caliber no dissection.    Cardiac event recorder 8/31/2020  ZioPatch Summary:   1. Sinus rhythm with appropriate heart rate range. Average 73, range 52 to 117 bpm.   2. Normal sinus node and AV node conduction normal. No pauses.   3. Rare PACs.   4. Rare PVCs.   5. No sustained rhythm changes. No atrial fibrillation/flutter.   6. 5 patient triggers, symptoms reported include fluttering and racing, shaky during sinus, 73 to 90 bpm..   Conclusion: Normal monitor.  Sinus rhythm with rare PVCs and PACs.  Symptoms during sinus rhythm.   Lexy Solomon MD [facility]      08/13/2018 EKG: Normal sinus rhythm, rate 83.  Reviewed by myself.     EKG 4/1/2022 sinus rhythm, rate 80, personally reviewed.    EKG 10/11/2022 sinus tachycardia, rate 109, personally interpreted  Assessment & Plan     1. Chest pain, precordial   EC-ECHOCARDIOGRAM COMPLETE W/O CONT      2. SVT (supraventricular tachycardia) (HCC)  EKG      3. Palpitations        4. Inappropriate sinus tachycardia  EC-ECHOCARDIOGRAM COMPLETE W/O CONT      5. Hypermobility syndrome            Medical Decision Making: Today's Assessment/Status/Plan:      Assessment  #.  Severe chest pain, tachycardia, diaphoresis  1.  Inappropriate sinus tachycardia.  2.  Ablation for IST, 2016.  3.  Thyroid disorder, on chronic supplementation.  4.  Sleep apnea on CPAP.  5.  Morbid obesity.  6.  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  Chest pain probably noncardiac    Recommendation Discussion  1.  Currently having severe constant chest pain associated with palpitations, some shortness of breath and clamminess, EKG today shows sinus tachycardia rate 109 with no acute abnormalities, she has no accompanying personal support, does not feel she could make it to the ER by herself therefore will have EMS transport her to the ER for further evaluation  2.  The patient was specifically instructed to increase Corlanor to 7.5 mg twice daily.  3.  If there is no specific cardiac or other medical problems found with ER evaluation that would warrant any other specific therapy it would be reasonable to consider empiric addition of calcium blockers.  4.  Has clinical manifestations on the spectrum of connective tissue disease with the profile of hypermobility syndrome but no confirming genetic abnormality for EDS or Marfan's.  5.  Recent RSV infection 10/2022 [facility], will obtain medications.  6.  RTC to be determined    [id number] complex patient and problems requiring assessment, management of new problem (severe chest pain), change of medications (Corlanor), coordination of care with EMS, personal communication with EMS, review of diagnostic data and medical records (ER hospitalization) records, ordering tests (EKG, echocardiogram) and most important  spending time communicating with the patient about her current cardiac problem and plan of care    [id number] multiple complex medical problems and high risk medications requiring assessment, management and monitoring of high risk medication, review of diagnostic data and medical records, ordering tests, initiating new therapy, coordinating care regarding hypertension.    ).

## 2022-11-10 NOTE — ED PROVIDER NOTES
"ED Provider Note    CHIEF COMPLAINT  Chief Complaint   Patient presents with    Palpitations     Pt was at her routine cardiology appointment at [facility], chest pain began around 0900 and became diaphoretic at appointment. L side chest pressure   Per EMS EKG unremarkable       HPI  Kristin Balderrama is a 32 y.o. female who presents for evaluation of chest pain and palpitations, sent in by her cardiologist.  While at their office she was complaining of excruciating chest pain and diaphoresis, this was all left-sided pressure in her chest.  Began little bit before 9 AM, at approximately 6-1/2 hours ago.  She has a history of the same.  She has a history of inappropriate sinus tachycardia and supraventricular tachycardia denies unilateral leg pain or swelling.  No coughing.  No fever.  Extensive past medical and psychiatric history as well as many evaluations in the emergency department    REVIEW OF SYSTEMS  Negative for fever, rash, vomiting, diarrhea, headache, focal weakness, focal numbness, focal tingling, back pain. All other systems are negative.     PAST MEDICAL HISTORY  Past Medical History:   Diagnosis Date    Abdominal pain     Anginal syndrome     random chest pain especially with tachycardia    Apnea, sleep     Arrhythmia     \"sinus tachycardia\", cariologist, Dr. Kumar; ablation 2/2016    Arthritis     osteo    ASTHMA     when around smoke    Back pain     Borderline personality disorder (HCC)     Breath shortness     with tachycardia    Bronchitis 02/08/2022    Last time was 12/21    Cardiac arrhythmia     Chickenpox     Chronic UTI 09/18/2010    Cough     Daytime sleepiness     Dental disorder 03/08/2021    infected tooth    Depression     Diabetes (HCC)     Diarrhea     incontinece    Disorder of thyroid     Hashimoto's    Fall     Fatigue     Frequent headaches     Gasping for breath     Gynecological disorder     PCOS    Headache(784.0)     Heart burn     Heart murmur     History of falling     " "Indigestion     Migraine     Mitochondrial disease (HCC)     Multiple personality disorder (HCC)     Nausea     Obesity     Other fatigue 06/29/2020    Pain 08/15/2012    back, 7/10    Painful joint     PCOS (polycystic ovarian syndrome)     Pneumonia 2012 12/2020    Psychosis (HCC)     Ringing in ears     Scoliosis     Shortness of breath     O2 as needed    Sinus tachycardia 10/31/2013    Sleep apnea     CPAP \"pulmonary doctor took me off mid year 2016\"    Snoring     Tonsillitis     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HCC)     2/8/22: Per pt: not anymore    Tuberculosis     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    Urinary bladder disorder     Suprapubic cath. 2/8/22: Not anymore.     Urinary incontinence     Weakness     Wears glasses        FAMILY HISTORY  Family History   Problem Relation Age of Onset    Hypertension Mother     Sleep Apnea Mother     Heart Disease Mother     Other Mother         hypothryod    Hypertension Maternal Uncle     Heart Disease Maternal Grandmother     Hypertension Maternal Grandmother     No Known Problems Sister     Other Sister         Narcolepsy;fibromyalsia;bone;nerve    Genitourinary () Problems Sister         endometriosis       SOCIAL HISTORY  Social History     Tobacco Use    Smoking status: Never    Smokeless tobacco: Never   Vaping Use    Vaping Use: Never used   Substance Use Topics    Alcohol use: No     Alcohol/week: 0.0 oz    Drug use: Not Currently     Frequency: 7.0 times per week     Types: Marijuana       SURGICAL HISTORY  Past Surgical History:   Procedure Laterality Date    MS LAP,DIAGNOSTIC ABDOMEN  2/14/2022    Procedure: LAPAROSCOPY;  Surgeon: Seamus Pisano M.D.;  Location: SURGERY SAME DAY [facility];  Service: Gynecology    OVARIAN CYSTECTOMY Right 2/14/2022    Procedure: EXCISION, CYST, OVARY;  Surgeon: Seamus Pisano M.D.;  Location: SURGERY SAME DAY [facility];  Service: Gynecology    BOWEL STIMULATOR INSERTION  3/10/2021    Procedure: INSERTION, ELECTRODE " LEADS AND PULSE GENERATOR, NEUROSTIMULATOR, SACRAL - REMOVAL OF INTERSTIM WITH REPLACEMENT OF SACRAL NEUROMODULATION DEVICE;  Surgeon: Joe Noyola M.D.;  Location: SURGERY [facility];  Service: General    MUSCLE BIOPSY Right 1/26/2017    Procedure: MUSCLE BIOPSY - THIGH;  Surgeon: Isidro Vigil M.D.;  Location: SURGERY [facility];  Service:     GASTROSCOPY WITH BALLOON DILATATION N/A 1/4/2017    Procedure: GASTROSCOPY WITH DILATATION;  Surgeon: Torres Vargas M.D.;  Location: SURGERY [facility];  Service:     BOWEL STIMULATOR INSERTION  7/13/2016    Procedure: BOWEL STIMULATOR INSERTION FOR PERMANENT INTERSTIM SACRAL IMPLANT;  Surgeon: Joe Noyola M.D.;  Location: SURGERY [facility];  Service:     RECOVERY  1/27/2016    Procedure: CATH LAB EP STUDY WITH SINUS NODE MODIFICATION LA;  Surgeon: [facility];  Location: SURGERY PRE-POST PROC UNIT [facility];  Service:     OTHER CARDIAC SURGERY  1/2016    cardiac ablation    NEURO DEST FACET L/S W/IG SNGL  4/21/2015    Performed by Reza Tabor at SURGERY [facility]    LUMBAR FUSION ANTERIOR  8/21/2012    Performed by SUSIE SAWANT at SURGERY [facility]    ALYSSA BY LAPAROSCOPY  8/29/2010    Performed by SHAYY JOHNS at SURGERY [facility]    LAMINOTOMY      OTHER ABDOMINAL SURGERY      TONSILLECTOMY      tonsillectomy       CURRENT MEDICATIONS  I personally reviewed the medication list in the charting documentation.     ALLERGIES  Allergies   Allergen Reactions    Depakote [Divalproex Sodium] Unspecified     Muscle spasms/muscle pain and weakness      Doxycycline Anaphylaxis and Vomiting     Other reaction(s): pustules/blisters    Montelukast [Singulair] Unspecified     Cardiac effusion    Vancomycin Itching     Pt becomes flushed in face and chest.   RXN=7/10/16    Amitriptyline Unspecified     Headaches      Aripiprazole [Abilify] Unspecified     Headaches/muscle twitching      Clindamycin Nausea           Flomax  "[Tamsulosin Hydrochloride] Swelling    Metformin Unspecified     Increased lactic acid     Other reaction(s): itching and rash/nausea vomiting    Tamsulosin Swelling     Swelling of legs    Tape Rash     Tears skin off  coban with Tegaderm tape ok intermittently  RXN=ongoing    Wound Dressing Adhesive Hives     By pt report    Ampicillin Rash     Pt reports that she received a rash     Ciprofloxacin Rash          Keflex Rash     Pt states she gets a rash with this medication  Tolerates ceftriaxone  Can take with Benadryl    Levofloxacin Unspecified     Leg muscle cramps    Metronidazole Rash     \"Vision problems\"    Penicillins Hives     Can take with Benadryl    Sulfa Drugs Itching and Myalgia     Muscle pain and weakness    Valproic Acid Rash     .       MEDICAL RECORD  I have reviewed patient's medical record and pertinent results are listed above.      PHYSICAL EXAM  VITAL SIGNS: /89   Pulse 96   Temp 36.4 °C (97.6 °F)   Resp 15   Ht 1.626 m (5' 4\")   Wt 110 kg (243 lb)   SpO2 95%   BMI 41.71 kg/m²    Constitutional: Well appearing patient in no acute distress.  Awake and alert, not toxic nor ill in appearance.  HENT: Normocephalic, no obvious evidence of acute trauma.  Eyes: No scleral icterus. Normal conjunctiva   Neck: Comfortable movement without any obvious restriction in the range of motion.  Cardiovascular: Upon ascultation I appreciate a regular heart rhythm and a normal rate.   Thorax & Lungs: Normal nonlabored respirations.  Minimal scattered expiratory wheezes, good air movement.  Abdomen: The abdomen is not visibly distended. Upon palpation, I find it to be without tenderness.  No mass appreciated.  Skin: The exposed portions of skin reveal no obvious rash or other abnormalities.  Extremities/Musculoskeletal: No obvious sign of acute trauma. No asymmetric calf tenderness or edema.   Neurologic: Alert & oriented. No focal deficits observed.   Psychiatric: Normal affect appropriate for the " clinical situation.    DIAGNOSTIC STUDIES / PROCEDURES    LABS/EKGs     Results for orders placed or performed during the hospital encounter of 11/10/22   CBC WITH DIFFERENTIAL   Result Value Ref Range    WBC 5.4 4.8 - 10.8 K/uL    RBC 4.81 4.20 - 5.40 M/uL    Hemoglobin 14.9 12.0 - 16.0 g/dL    Hematocrit 43.0 37.0 - 47.0 %    MCV 89.4 81.4 - 97.8 fL    MCH 31.0 27.0 - 33.0 pg    MCHC 34.7 33.6 - 35.0 g/dL    RDW 41.8 35.9 - 50.0 fL    Platelet Count 185 164 - 446 K/uL    MPV 11.6 9.0 - 12.9 fL    Neutrophils-Polys 61.00 44.00 - 72.00 %    Lymphocytes 26.90 22.00 - 41.00 %    Monocytes 8.60 0.00 - 13.40 %    Eosinophils 2.60 0.00 - 6.90 %    Basophils 0.70 0.00 - 1.80 %    Immature Granulocytes 0.20 0.00 - 0.90 %    Nucleated RBC 0.00 /100 WBC    Neutrophils (Absolute) 3.26 2.00 - 7.15 K/uL    Lymphs (Absolute) 1.44 1.00 - 4.80 K/uL    Monos (Absolute) 0.46 0.00 - 0.85 K/uL    Eos (Absolute) 0.14 0.00 - 0.51 K/uL    Baso (Absolute) 0.04 0.00 - 0.12 K/uL    Immature Granulocytes (abs) 0.01 0.00 - 0.11 K/uL    NRBC (Absolute) 0.00 K/uL   COMP METABOLIC PANEL   Result Value Ref Range    Sodium 136 135 - 145 mmol/L    Potassium 4.1 3.6 - 5.5 mmol/L    Chloride 102 96 - 112 mmol/L    Co2 18 (L) 20 - 33 mmol/L    Anion Gap 16.0 7.0 - 16.0    Glucose 106 (H) 65 - 99 mg/dL    Bun 13 8 - 22 mg/dL    Creatinine 0.74 0.50 - 1.40 mg/dL    Calcium 10.3 8.5 - 10.5 mg/dL    AST(SGOT) 29 12 - 45 U/L    ALT(SGPT) 39 2 - 50 U/L    Alkaline Phosphatase 68 30 - 99 U/L    Total Bilirubin 0.6 0.1 - 1.5 mg/dL    Albumin 4.9 3.2 - 4.9 g/dL    Total Protein 7.3 6.0 - 8.2 g/dL    Globulin 2.4 1.9 - 3.5 g/dL    A-G Ratio 2.0 g/dL   TROPONIN   Result Value Ref Range    Troponin T <6 6 - 19 ng/L   ESTIMATED GFR   Result Value Ref Range    GFR (CKD-EPI) 109 >60 mL/min/1.73 m 2   EKG   Result Value Ref Range    Report       [facility] Emergency Dept.    Test Date:  2022-11-10  Pt Name:    HARLEY DE LA CRUZ               Department: ER  MRN:        [id number]                      Room:       [facility]  Gender:     Female                       Technician: [id number]  :        1990                   Requested By:ER TRIAGE PROTOCOL  Order #:    [id number]                    Reading MD: DENNIS REGALADO MD    Measurements  Intervals                                Axis  Rate:       103                          P:          35  MS:         124                          QRS:        15  QRSD:       81                           T:          25  QT:         407  QTc:        533    Interpretive Statements  12 Lead EKG interpreted by me to show: -- Rate 103-- Rhythm: Normal sinus  rhythm  -- Axis: Normal -- MS and QRS Intervals: Normal -- T waves: No acute changes  --  ST segments: No acute changes -- Ectopy: None. My impression of this EKG:  Does  not indicate acute ischemia at this time. No change from 10 /24/22  Electronically Signed On 11- 15:15:44 PST by DENNIS REGALADO MD       *Note: Due to a large number of results and/or encounters for the requested time period, some results have not been displayed. A complete set of results can be found in Results Review.        RADIOLOGY     DX-CHEST-PORTABLE (1 VIEW)   Final Result      No acute cardiac or pulmonary abnormalities are identified. Lung volumes are low.            COURSE & MEDICAL DECISION MAKING  I have reviewed any medical record information, laboratory studies and radiographic results as noted above.  Differential diagnoses includes: ACS, pneumothorax, anemia, dehydration, electrolyte abnormalities    Encounter Summary: This is a very pleasant 32 y.o. female who unfortunately required evaluation in the emergency department today with chest pain, sent in by her cardiologist for evaluation.  Chest pain is been constant for at least 6 hours, maximally severe, associated at times with palpitations.  History of the same.  Appears comfortable on exam, heart rate is improved now in the  double digits, her EKG does not give indication of ischemia.  Her troponin is negative excluding cardiac ischemia given the duration of constant symptoms.  Additionally, in the past 6 years she has had 4 CT scans excluding pulmonary embolism and 14 negative D-dimers, she is not hypoxic, I do not think this is pulmonary embolism.  At this point she will follow-up with her cardiologist, return instructions provided, discharged home in stable condition  Heart score: Low    DISPOSITION: Charged home in stable condition      FINAL IMPRESSION  1. Palpitations    2. Chest pain, unspecified type           This dictation was created using voice recognition software. The accuracy of the dictation is limited to the abilities of the software. I expect there may be some errors of grammar and possibly content. The nursing notes were reviewed and certain aspects of this information were incorporated into this note.    Electronically signed by: Pedrito Bhatti M.D., 11/10/2022 3:56 PM

## 2022-11-10 NOTE — TELEPHONE ENCOUNTER
----- Message from John Leon M.D. sent at 11/10/2022  1:39 PM PST -----  Regarding: Medical records  1.  Please obtain all medical records, EKGs, discharge summary from [facility] 10/2022    2.  Medical records specific genetic testing report from Torres Brody MD, [facility]    Please notify me when they are available in media    Thank you very much

## 2022-11-10 NOTE — TELEPHONE ENCOUNTER
Keira, please see SW request and fax for records as directed by SW. Thank you!      Will forward to myself to keep this msg ensuring records are collected for SW review.

## 2022-11-11 ENCOUNTER — TELEPHONE (OUTPATIENT)
Dept: CARDIOLOGY | Facility: MEDICAL CENTER | Age: 32
End: 2022-11-11
Payer: MEDICARE

## 2022-11-11 NOTE — ED NOTES
"Pt discharged home  . IV discontinued and gauze placed, pt in possession of belongings. Pt provided discharge education and information pertaining to medications and follow up appointments. Pt received copy of discharge instructions and verbalized understanding. BP (!) 147/89   Pulse 93   Temp 36.6 °C (97.9 °F)   Resp 16   Ht 1.626 m (5' 4\")   Wt 110 kg (243 lb)   SpO2 93%   BMI 41.71 kg/m²   "

## 2022-11-11 NOTE — TELEPHONE ENCOUNTER
PA APPROVED    Kristin Balderrama Key: [id number] - PA Case ID: [id number] - Rx #: [id number] help? Call us at [phone]  Outcome  Approvedtoday  PA Case: [id number], Status: Approved, Coverage Starts on: 1/1/2022 12:00:00 AM, Coverage Ends on: 12/31/2023 12:00:00 AM. Questions? Contact [phone].  Drug  Corlanor 7.5MG tablets  Form  [employer] Electronic PA Form  Original Claim Info  565,25 PA REQD CALL [phone]

## 2022-11-11 NOTE — TELEPHONE ENCOUNTER
Records requested from both [facility] and [facility] per SW. Fax confirmation received and sent to [facility] for scanning.

## 2022-11-11 NOTE — TELEPHONE ENCOUNTER
Kristin Balderrama Key: [id number] - PA Case ID: [id number] - Rx #: [id number] help? Call us at [phone]  [facility]  [facility]  Drug  Corlanor 7.5MG tablets  Form  [employer] Electronic PA Form  Original Claim Info  458,03 PA REQD CALL [phone]

## 2022-11-17 NOTE — PROGRESS NOTES
"       Internal Medicine Interval Note  Note Author: Negin Sims M.D.     Name Kristin Balderrama     1990   Age/Sex 27 y.o. female   MRN [id number]   Code Status Full     After 5PM or if no immediate response to page, please call for cross-coverage  Attending/Team: Gabriel / Harish See Patient List for primary contact information  Call [phone] to page    1st Call - Day Intern (R1):   Levi 2nd Call - Day Sr. Resident (R2/R3):   Ryan         Reason for interval visit  (Principal Problem)   R eye pain      Interval Problem Daily Status Update  (24 hours, problem oriented, brief subjective history, new lab/imaging data pertinent to that problem)     Pt does not report eye pain this morning. She states her blurry vision is improved and her ability to see colors is returning but colors still remain \"dull.\" No HA/nausea/vomiting.     Pt agreeable to staying in hospital for a few more days to obtain her MRIs. Pt is to obtain her remote for Interstim and bring it in so it can be turned off during the MRI. Will talk to radiology about this.     Pt complains of suprapubic pain and dysuria. UA ordered.     Neuro evaluated pt yesterday. Appreciate recommendations.      -198 today. Improved from yesterday.     Left breast cellulitis stable. Looks the same as yesterday.       Review of Systems   Constitutional: Negative for chills and fever.   HENT: Negative for ear pain and hearing loss.    Eyes: Positive for blurred vision and pain.   Respiratory: Negative for cough and shortness of breath.    Cardiovascular: Negative for chest pain and leg swelling.   Gastrointestinal: Negative for abdominal pain, diarrhea, nausea and vomiting.   Genitourinary: Negative for flank pain and hematuria.   Musculoskeletal: Negative for falls and joint pain.   Skin: Negative for itching and rash.   Neurological: Positive for dizziness. Negative for seizures and loss of consciousness.   Endo/Heme/Allergies: " Negative for polydipsia. Does not bruise/bleed easily.   Psychiatric/Behavioral: Negative for substance abuse. The patient has insomnia.        Disposition/Barriers to discharge:   Pt does not need to be set up with an infusion center. She will be given the remaining few doses of abx via intramuscular injection upon d/c.   MRIs. Pt needs to coordinate with her family to bring in her Interstim so it can be turned off for the MRI. MRI cannot be completed with the Interstim on.       Consultants/Specialty  Neuro, MD Dr. Benja Henley-will not see pt until neurology evaluates and a slew of lab tests are run including: Quantiferon, IgG, Antiphospholipid, RPR, B12, CMP/CBC, and a few others (need to clarify). He also only wants to speak to attendings. Requested facesheet and  H&P be sent to his office, [phone]  PCP: Torres Brody M.D.      Quality Measures  Quality-Core Measures   Reviewed items::  EKG reviewed, Labs reviewed, Medications reviewed and Radiology images reviewed  Andrade catheter::  No Andrade  DVT prophylaxis pharmacological::  Enoxaparin (Lovenox)  Ulcer Prophylaxis::  Not indicated      Physical Exam       Vitals:    09/10/18 1940 09/11/18 0420 09/11/18 0749 09/11/18 1735   BP: 112/45 134/76 105/61 130/71   Pulse: 75 65 (!) 56 63   Resp: 19 18 (!) 22 18   Temp: 36.3 °C (97.3 °F) 35.9 °C (96.7 °F) 36.2 °C (97.1 °F) 36.6 °C (97.9 °F)   SpO2: 94% 97% 91% 94%   Weight:       Height:         Body mass index is 45.93 kg/m².   Oxygen Therapy:  Pulse Oximetry: 94 %, O2 (LPM): 0, O2 Delivery: None (Room Air)    Physical Exam   Constitutional: She is oriented to person, place, and time and well-developed, well-nourished, and in no distress.   HENT:   Head: Normocephalic and atraumatic.   Mouth/Throat: No oropharyngeal exudate.   Eyes: Pupils are equal, round, and reactive to light. EOM are normal. No scleral icterus.   Did not notice afferent defects while evaluating pupils   Neck: Normal range of  motion. Neck supple.   Cardiovascular: Normal rate and regular rhythm.  Exam reveals no gallop and no friction rub.    No murmur heard.  Pulmonary/Chest: Effort normal and breath sounds normal. No respiratory distress. She has no wheezes. She has no rales.   Mild splotches of erythema predominantly on the left breast.    Abdominal: Soft. Bowel sounds are normal. She exhibits no distension and no mass. There is no tenderness. There is no rebound and no guarding.   Genitourinary:   Genitourinary Comments: Suprapubic tenderness   Musculoskeletal: Normal range of motion. She exhibits no deformity.   Lymphadenopathy:     She has no cervical adenopathy.   Neurological: She is alert and oriented to person, place, and time.   Right eye vision 20/40 with glasses   Skin: Skin is warm and dry. She is not diaphoretic.   Psychiatric: Mood, memory, affect and judgment normal.         Assessment/Plan     * Acute right eye pain   Assessment & Plan    Improving  -5d of right stabbing eye pain, with decreased peripheral vision, blurry vision, decreased appreciation of colors, extreme pain on EOM, no injection   -ESR/CRP non elevated (temporal arteritis less likely)  -prior admissions for similar eye pain, prv diagnosed with optic neuritis and treated with steroid eye drops with resolution per pt  -possible optic neuritis, neuromyelitis optica, MS (all treated with steroids)  -solumedrol iv 250mg q6h x3days, prednisone po 1 mg/kg x11day & 4 day taper for presumed optic neuritis treatment  -many labs pending  - MRI, patient has Interstim device for bowel stimulation to be addressed before MRI (Dr. Noyola - for possibility of turning it off for MRI); pending. Need pt to bring in remote to turn off her Interstim  -Neuro consulted, appreciate recommendations        Cellulitis of left breast- (present on admission)   Assessment & Plan     Pt to receive Abx through Friday, 09/13. Per pt, she states she missed a dose last Friday. If that is  the case, then her last dose will be this Saturday 09/14. Review medical records   -Ceftriaxone 1 g IV daily.  -Upon d/c, receive remaining doses via IM.         Type 2 diabetes mellitus without complication, without long-term current use of insulin ([facility])- (present on admission)   Assessment & Plan    -insulin sliding scale, full home medication records not received yet        Chronic respiratory failure with hypoxia, on home oxygen therapy (HCC)- (present on admission)   Assessment & Plan    -patient on O2 at home until medicare stopped supplying, pt reports transient desats to 60% on rare occasion  -O2 protocol and continuous O2 monitoring        TONYA (obstructive sleep apnea)- (present on admission)   Assessment & Plan    -patient on O2 at home until medicare stopped supplying  -O2 protocol and continuous O2 monitoring        Chronic pain syndrome- (present on admission)   Assessment & Plan    -Baclofen 10 mg p.o. twice daily.        Peripheral neuropathy ([facility])- (present on admission)   Assessment & Plan    Lyrica 100 bid        Depression- (present on admission)   Assessment & Plan    -continue home dose trazadone        Psychosis, schizophrenia, simple ([facility])- (present on admission)   Assessment & Plan    -Ziprasidone 80 mg p.o. twice daily.        Anxiety- (present on admission)   Assessment & Plan    -Fluoxetine 10 mg p.o. daily.           monitor BP  cont meds.

## 2022-11-19 ENCOUNTER — OFFICE VISIT (OUTPATIENT)
Dept: URGENT CARE | Facility: CLINIC | Age: 32
End: 2022-11-19
Payer: MEDICARE

## 2022-11-19 ENCOUNTER — APPOINTMENT (OUTPATIENT)
Dept: RADIOLOGY | Facility: IMAGING CENTER | Age: 32
End: 2022-11-19
Attending: STUDENT IN AN ORGANIZED HEALTH CARE EDUCATION/TRAINING PROGRAM
Payer: MEDICARE

## 2022-11-19 VITALS
OXYGEN SATURATION: 99 % | DIASTOLIC BLOOD PRESSURE: 80 MMHG | WEIGHT: 240.9 LBS | RESPIRATION RATE: 16 BRPM | TEMPERATURE: 98.3 F | SYSTOLIC BLOOD PRESSURE: 134 MMHG | HEIGHT: 64 IN | BODY MASS INDEX: 41.13 KG/M2 | HEART RATE: 87 BPM

## 2022-11-19 DIAGNOSIS — M25.522 LEFT ELBOW PAIN: ICD-10-CM

## 2022-11-19 PROCEDURE — 99213 OFFICE O/P EST LOW 20 MIN: CPT | Performed by: STUDENT IN AN ORGANIZED HEALTH CARE EDUCATION/TRAINING PROGRAM

## 2022-11-19 PROCEDURE — 73080 X-RAY EXAM OF ELBOW: CPT | Mod: TC,LT | Performed by: FAMILY MEDICINE

## 2022-11-19 ASSESSMENT — ENCOUNTER SYMPTOMS
WHEEZING: 0
FEVER: 0
CHILLS: 0
PALPITATIONS: 0
FOCAL WEAKNESS: 0
TINGLING: 0
WEAKNESS: 0
SHORTNESS OF BREATH: 0
SENSORY CHANGE: 0

## 2022-11-19 ASSESSMENT — FIBROSIS 4 INDEX: FIB4 SCORE: 0.83

## 2022-11-19 NOTE — PROGRESS NOTES
"Subjective     Kristin Balderrama is a 32 y.o. female who presents with Elbow Pain (X 1 Week )            Kristin is a 32-year-old female who presents with left elbow pain starting approximately 1 week ago.  Patient states that she fell on an outstretched arm and twisted her ankle.  Patient was transported by EMS to the ER in which imaging was done of her ankle, wrist and shoulder.  Patient states imaging came back normal.  Patient was advised to follow-up with the [facility].  Per patient, [facility] ordered MRI of left shoulder and advised to get MRI and F/U for further management/evaluation.  Patient states she recently developed left elbow pain that radiates into her forearm and wrist.  Patient states her elbow was \"twitching\" for 4 hours yesterday.  She states decreased range of motion secondary to pain/discomfort of left elbow joint.  She has not noticed any swelling, erythema, ecchymosis.  Denies neck pain.  She is still experiencing symptoms of shoulder pain/wrist pain which have already been previously imaged.      Review of Systems   Constitutional:  Negative for chills, fever and malaise/fatigue.   Respiratory:  Negative for shortness of breath and wheezing.    Cardiovascular:  Negative for chest pain and palpitations.   Musculoskeletal:  Positive for joint pain.   Neurological:  Negative for tingling, sensory change, focal weakness and weakness.   All other systems reviewed and are negative.           Objective     /80   Pulse 87   Temp 36.8 °C (98.3 °F) (Temporal)   Resp 16   Ht 1.626 m (5' 4\")   Wt 109 kg (240 lb 14.4 oz)   SpO2 99%   BMI 41.35 kg/m²      Physical Exam  Vitals reviewed.   Constitutional:       General: She is not in acute distress.     Appearance: Normal appearance. She is not ill-appearing or toxic-appearing.   HENT:      Head: Normocephalic and atraumatic.   Cardiovascular:      Rate and Rhythm: Normal rate and regular rhythm.   Pulmonary:      Effort: Pulmonary effort is normal.    "   Breath sounds: Normal breath sounds.   Musculoskeletal:      Left elbow: Decreased range of motion. Tenderness present in lateral epicondyle and olecranon process.      Left forearm: Normal. No tenderness.      Left wrist: Normal.      Cervical back: Normal.      Thoracic back: Normal.      Lumbar back: Normal.   Skin:     General: Skin is warm and dry.      Capillary Refill: Capillary refill takes less than 2 seconds.   Neurological:      General: No focal deficit present.      Mental Status: She is alert. Mental status is at baseline.                RADIOLOGY RESULTS     DX-ELBOW-COMPLETE 3+ LEFT    Result Date: 11/19/2022 11/19/2022 10:36 AM HISTORY/REASON FOR EXAM:  Pain/Deformity Following Trauma TECHNIQUE/EXAM DESCRIPTION AND NUMBER OF VIEWS:  3 views of the LEFT elbow. COMPARISON: None FINDINGS: There is no evidence of joint effusion. There is no evidence of displaced fracture or dislocation. There is no focal soft tissue swelling.     Normal elbow series.    DX-SHOULDER 2+    Result Date: 11/15/2022  1 view axillary of the left shoulder is negative for acute fracture or dislocation                      Assessment & Plan        1. Left elbow pain  - DX-ELBOW-COMPLETE 3+ LEFT  - diclofenac sodium (VOLTAREN) 1 % Gel; Apply 2 g topically 1 time a day as needed (pain) for up to 14 days.  Dispense: 100 g; Refill: 0      DX-ELBOW-COMPLETE 3+ LEFT PER RADIOLOGY:  There is no evidence of joint effusion. There is no evidence of displaced fracture or dislocation. There is no focal soft tissue swelling.     Differential diagnoses, supportive care measures (rest, ice/heat, OTC medications, Voltaren gel, gentle stretching/range of motion) and indications for immediate follow-up discussed with patient. Pathogenesis of diagnosis discussed including typical length and natural progression.  Patient advised to follow-up with primary care provider.  Patient already been evaluated by [facility] for left shoulder pain.       Instructed to return to urgent care or nearest emergency department if symptoms fail to improve, for any change in condition, further concerns, or new concerning symptoms.    Patient states understanding and agree with the plan of care and discharge instructions.

## 2022-11-20 ENCOUNTER — HOSPITAL ENCOUNTER (EMERGENCY)
Facility: MEDICAL CENTER | Age: 32
End: 2022-11-20
Attending: EMERGENCY MEDICINE
Payer: MEDICARE

## 2022-11-20 ENCOUNTER — APPOINTMENT (OUTPATIENT)
Dept: RADIOLOGY | Facility: MEDICAL CENTER | Age: 32
End: 2022-11-20
Attending: EMERGENCY MEDICINE
Payer: MEDICARE

## 2022-11-20 VITALS
DIASTOLIC BLOOD PRESSURE: 73 MMHG | BODY MASS INDEX: 42.35 KG/M2 | OXYGEN SATURATION: 94 % | WEIGHT: 246.69 LBS | HEART RATE: 82 BPM | TEMPERATURE: 98.4 F | SYSTOLIC BLOOD PRESSURE: 121 MMHG | RESPIRATION RATE: 16 BRPM

## 2022-11-20 DIAGNOSIS — E86.0 DEHYDRATION: ICD-10-CM

## 2022-11-20 LAB
ALBUMIN SERPL BCP-MCNC: 4.5 G/DL (ref 3.2–4.9)
ALBUMIN/GLOB SERPL: 2 G/DL
ALP SERPL-CCNC: 64 U/L (ref 30–99)
ALT SERPL-CCNC: 32 U/L (ref 2–50)
ANION GAP SERPL CALC-SCNC: 14 MMOL/L (ref 7–16)
APPEARANCE UR: CLEAR
AST SERPL-CCNC: 21 U/L (ref 12–45)
BASOPHILS # BLD AUTO: 0.9 % (ref 0–1.8)
BASOPHILS # BLD: 0.05 K/UL (ref 0–0.12)
BILIRUB SERPL-MCNC: 0.4 MG/DL (ref 0.1–1.5)
BILIRUB UR QL STRIP.AUTO: NEGATIVE
BUN SERPL-MCNC: 13 MG/DL (ref 8–22)
CALCIUM SERPL-MCNC: 9.9 MG/DL (ref 8.5–10.5)
CHLORIDE SERPL-SCNC: 107 MMOL/L (ref 96–112)
CO2 SERPL-SCNC: 19 MMOL/L (ref 20–33)
COLOR UR: YELLOW
CREAT SERPL-MCNC: 0.67 MG/DL (ref 0.5–1.4)
EOSINOPHIL # BLD AUTO: 0.13 K/UL (ref 0–0.51)
EOSINOPHIL NFR BLD: 2.2 % (ref 0–6.9)
ERYTHROCYTE [DISTWIDTH] IN BLOOD BY AUTOMATED COUNT: 41.4 FL (ref 35.9–50)
GFR SERPLBLD CREATININE-BSD FMLA CKD-EPI: 118 ML/MIN/1.73 M 2
GLOBULIN SER CALC-MCNC: 2.3 G/DL (ref 1.9–3.5)
GLUCOSE SERPL-MCNC: 95 MG/DL (ref 65–99)
GLUCOSE UR STRIP.AUTO-MCNC: NEGATIVE MG/DL
HCG SERPL QL: NEGATIVE
HCT VFR BLD AUTO: 40.2 % (ref 37–47)
HGB BLD-MCNC: 13.9 G/DL (ref 12–16)
IMM GRANULOCYTES # BLD AUTO: 0 K/UL (ref 0–0.11)
IMM GRANULOCYTES NFR BLD AUTO: 0 % (ref 0–0.9)
KETONES UR STRIP.AUTO-MCNC: NEGATIVE MG/DL
LEUKOCYTE ESTERASE UR QL STRIP.AUTO: NEGATIVE
LYMPHOCYTES # BLD AUTO: 2.05 K/UL (ref 1–4.8)
LYMPHOCYTES NFR BLD: 35.2 % (ref 22–41)
MCH RBC QN AUTO: 31.1 PG (ref 27–33)
MCHC RBC AUTO-ENTMCNC: 34.6 G/DL (ref 33.6–35)
MCV RBC AUTO: 89.9 FL (ref 81.4–97.8)
MICRO URNS: NORMAL
MONOCYTES # BLD AUTO: 0.56 K/UL (ref 0–0.85)
MONOCYTES NFR BLD AUTO: 9.6 % (ref 0–13.4)
NEUTROPHILS # BLD AUTO: 3.04 K/UL (ref 2–7.15)
NEUTROPHILS NFR BLD: 52.1 % (ref 44–72)
NITRITE UR QL STRIP.AUTO: NEGATIVE
NRBC # BLD AUTO: 0 K/UL
NRBC BLD-RTO: 0 /100 WBC
NT-PROBNP SERPL IA-MCNC: 26 PG/ML (ref 0–125)
PH UR STRIP.AUTO: 6 [PH] (ref 5–8)
PLATELET # BLD AUTO: 202 K/UL (ref 164–446)
PMV BLD AUTO: 11.5 FL (ref 9–12.9)
POTASSIUM SERPL-SCNC: 4.2 MMOL/L (ref 3.6–5.5)
PROT SERPL-MCNC: 6.8 G/DL (ref 6–8.2)
PROT UR QL STRIP: NEGATIVE MG/DL
RBC # BLD AUTO: 4.47 M/UL (ref 4.2–5.4)
RBC UR QL AUTO: NEGATIVE
SODIUM SERPL-SCNC: 140 MMOL/L (ref 135–145)
SP GR UR STRIP.AUTO: 1
UROBILINOGEN UR STRIP.AUTO-MCNC: 0.2 MG/DL
WBC # BLD AUTO: 5.8 K/UL (ref 4.8–10.8)

## 2022-11-20 PROCEDURE — 80053 COMPREHEN METABOLIC PANEL: CPT

## 2022-11-20 PROCEDURE — 85025 COMPLETE CBC W/AUTO DIFF WBC: CPT

## 2022-11-20 PROCEDURE — 83880 ASSAY OF NATRIURETIC PEPTIDE: CPT

## 2022-11-20 PROCEDURE — 99283 EMERGENCY DEPT VISIT LOW MDM: CPT | Mod: 25

## 2022-11-20 PROCEDURE — 84703 CHORIONIC GONADOTROPIN ASSAY: CPT

## 2022-11-20 PROCEDURE — 81003 URINALYSIS AUTO W/O SCOPE: CPT

## 2022-11-20 PROCEDURE — 71045 X-RAY EXAM CHEST 1 VIEW: CPT

## 2022-11-20 PROCEDURE — 36415 COLL VENOUS BLD VENIPUNCTURE: CPT

## 2022-11-20 ASSESSMENT — FIBROSIS 4 INDEX: FIB4 SCORE: 0.83

## 2022-11-21 NOTE — ED TRIAGE NOTES
Chief Complaint   Patient presents with    Other    Weight Gain     Pt reports that she has gained 2KG since yesterday. States that she has had 1L of H2O and only voided x1. Reports no urge to void. Concerned with bloating.   BP (!) 163/97   Pulse 99   Temp 36.8 °C (98.2 °F) (Temporal)   Resp 20   Wt 112 kg (246 lb 11.1 oz)   SpO2 99% Pt informed of wait times. Educated on triage process.  Asked to return to triage RN for any new or worsening of symptoms. Thanked for patience.

## 2022-11-21 NOTE — TELEPHONE ENCOUNTER
Keira,  I do not see that the records you requested have been sent yet, could you please request again?  KRYSTIAN--RIGOBERTO

## 2022-11-21 NOTE — DISCHARGE INSTRUCTIONS
Follow up with your regular doctor within the next 1-2 days.    Return to ER for any problems or concerns.

## 2022-11-21 NOTE — ED NOTES
Pt AOx4 and ready for education. All discharge instructions given to pt. Pt verbalized understanding of all discharge instructions. All lines removed prior to discharge. All questions answered. Pt to lobby via ambulation.

## 2022-11-21 NOTE — ED PROVIDER NOTES
"ED Provider Note    Scribed for Lien Fox M.D. by Rocco Katz. 11/20/2022  7:14 PM    Primary care provider: Torres Brody M.D.  Means of arrival: Walk-in  History obtained from: Patient  History limited by: None  CHIEF COMPLAINT  Chief Complaint   Patient presents with    Other    Weight Gain       HPI  Kristin Balderrama is a 32 y.o. female who presents for weight gain and bloating onset today. Per patient, she urinated twice only a small amount despite drinking normal amount of fluid.  She is concerned that she is retaining fluid.  She is very bloated, her kidneys hurt, and she has gained 2 kg in 24 hours. Moreover, she feels that her legs are swollen, and her belly is big and bloating.  She also feels lightheaded and dizzy. 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 She also mentioned that she just had a shoulder issue yesterday for which she was seen at [place]. She has associated shortness of breath with walking, but otherwise no shortness of breath.  No chest pain.  No cough, runny nose or sore throat.  Denies dysuria, hematuria, frequency urgency of urination, cloudy or foul-smelling urine, vomiting, diarrhea, or hematuria. She was able to provide a urine specimen here in the ER upon arrival.  Patient is well-known to this emergency department as well as other ERs around the area.      REVIEW OF SYSTEMS  Pertinent positives include shortness of breath, fluid in abdomen, leg swelling, lightheadedness, and kidney pain.   Pertinent negatives include no dysuria, vomiting, diarrhea, or hematuria.   See HPI for further details. All other systems are negative.    PAST MEDICAL HISTORY  Past Medical History:   Diagnosis Date    Abdominal pain     Anginal syndrome     random chest pain especially with tachycardia    Apnea, sleep     Arrhythmia     \"sinus tachycardia\", cariologist, Dr. Kumar; ablation 2/2016    " "Arthritis     osteo    ASTHMA     when around smoke    Back pain     Borderline personality disorder (HCC)     Breath shortness     with tachycardia    Bronchitis 02/08/2022    Last time was 12/21    Cardiac arrhythmia     Chickenpox     Chronic UTI 09/18/2010    Cough     Daytime sleepiness     Dental disorder 03/08/2021    infected tooth    Depression     Diabetes (HCC)     Diarrhea     incontinece    Disorder of thyroid     Hashimoto's    Fall     Fatigue     Frequent headaches     Gasping for breath     Gynecological disorder     PCOS    Headache(784.0)     Heart burn     Heart murmur     History of falling     Indigestion     Migraine     Mitochondrial disease (HCC)     Multiple personality disorder (HCC)     Nausea     Obesity     Other fatigue 06/29/2020    Pain 08/15/2012    back, 7/10    Painful joint     PCOS (polycystic ovarian syndrome)     Pneumonia 2012 12/2020    Psychosis (HCC)     Ringing in ears     Scoliosis     Shortness of breath     O2 as needed    Sinus tachycardia 10/31/2013    Sleep apnea     CPAP \"pulmonary doctor took me off mid year 2016\"    Snoring     Tonsillitis     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HCC)     2/8/22: Per pt: not anymore    Tuberculosis     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    Urinary bladder disorder     Suprapubic cath. 2/8/22: Not anymore.     Urinary incontinence     Weakness     Wears glasses        FAMILY HISTORY  Family History   Problem Relation Age of Onset    Hypertension Mother     Sleep Apnea Mother     Heart Disease Mother     Other Mother         hypothryod    Hypertension Maternal Uncle     Heart Disease Maternal Grandmother     Hypertension Maternal Grandmother     No Known Problems Sister     Other Sister         Narcolepsy;fibromyalsia;bone;nerve    Genitourinary () Problems Sister         endometriosis       SOCIAL HISTORY  Social History     Tobacco Use    Smoking status: Never    Smokeless tobacco: Never   Vaping Use    Vaping Use: Never used "   Substance Use Topics    Alcohol use: No     Alcohol/week: 0.0 oz    Drug use: Not Currently     Frequency: 7.0 times per week     Types: Marijuana      Social History     Substance and Sexual Activity   Drug Use Not Currently    Frequency: 7.0 times per week    Types: Marijuana       SURGICAL HISTORY  Past Surgical History:   Procedure Laterality Date    OH LAP,DIAGNOSTIC ABDOMEN  2/14/2022    Procedure: LAPAROSCOPY;  Surgeon: Seamus Pisano M.D.;  Location: SURGERY SAME DAY [facility];  Service: Gynecology    OVARIAN CYSTECTOMY Right 2/14/2022    Procedure: EXCISION, CYST, OVARY;  Surgeon: Seamus Pisano M.D.;  Location: SURGERY SAME DAY [facility];  Service: Gynecology    BOWEL STIMULATOR INSERTION  3/10/2021    Procedure: INSERTION, ELECTRODE LEADS AND PULSE GENERATOR, NEUROSTIMULATOR, SACRAL - REMOVAL OF INTERSTIM WITH REPLACEMENT OF SACRAL NEUROMODULATION DEVICE;  Surgeon: Joe Noyola M.D.;  Location: [facility];  Service: General    MUSCLE BIOPSY Right 1/26/2017    Procedure: MUSCLE BIOPSY - THIGH;  Surgeon: Isidro Vigil M.D.;  Location: [facility];  Service:     GASTROSCOPY WITH BALLOON DILATATION N/A 1/4/2017    Procedure: GASTROSCOPY WITH DILATATION;  Surgeon: Torres Vargas M.D.;  Location: [facility];  Service:     BOWEL STIMULATOR INSERTION  7/13/2016    Procedure: BOWEL STIMULATOR INSERTION FOR PERMANENT INTERSTIM SACRAL IMPLANT;  Surgeon: Joe Noyola M.D.;  Location: [facility];  Service:     RECOVERY  1/27/2016    Procedure: CATH LAB EP STUDY WITH SINUS NODE MODIFICATION ABHINAV;  Surgeon: Recoveryonly Surgery;  Location: SURGERY PRE-POST PROC UNIT [facility];  Service:     OTHER CARDIAC SURGERY  1/2016    cardiac ablation    NEURO DEST FACET L/S W/IG SNGL  4/21/2015    Performed by Reza Tabor at [facility] ORS    LUMBAR FUSION ANTERIOR  8/21/2012    Performed by SUSIE SAWANT at [facility] ORS    ALYSSA BY LAPAROSCOPY  8/29/2010     Performed by SHAYY JOHNS at SURGERY [facility] ORS    LAMINOTOMY      OTHER ABDOMINAL SURGERY      TONSILLECTOMY      tonsillectomy       CURRENT MEDICATIONS  Home Medications       Reviewed by Consuelo Cabezas R.N. (Registered Nurse) on 11/20/22 at 1645  Med List Status: Not Addressed     Medication Last Dose Status   albuterol 108 (90 Base) MCG/ACT Aero Soln inhalation aerosol  Active   Aspirin-Acetaminophen-Caffeine (EXCEDRIN MIGRAINE PO)  Active   diclofenac sodium (VOLTAREN) 1 % Gel  Active   diphenhydrAMINE HCl (BENADRYL ALLERGY PO)  Active   docusate sodium (COLACE) 100 MG Cap  Active   etonogestrel (NEXPLANON) 68 MG Implant implant  Active   Ipratropium-Albuterol (DUONEB INH)  Active   ivabradine (CORLANOR) 7.5 MG Tab tablet  Active   lactulose 20 GM/30ML Solution  Active   Melatonin 10 MG Tab  Active   ondansetron (ZOFRAN ODT) 4 MG TABLET DISPERSIBLE  Active   SUMAtriptan (IMITREX) 50 MG Tab  Active   traZODone (DESYREL) 100 MG Tab  Active   ziprasidone (GEODON) 40 MG Cap  Active                    ALLERGIES  Allergies   Allergen Reactions    Depakote [Divalproex Sodium] Unspecified     Muscle spasms/muscle pain and weakness      Doxycycline Anaphylaxis and Vomiting     Other reaction(s): pustules/blisters    Montelukast [Singulair] Unspecified     Cardiac effusion    Vancomycin Itching     Pt becomes flushed in face and chest.   RXN=7/10/16    Amitriptyline Unspecified     Headaches      Aripiprazole [Abilify] Unspecified     Headaches/muscle twitching      Clindamycin Nausea           Flomax [Tamsulosin Hydrochloride] Swelling    Metformin Unspecified     Increased lactic acid     Other reaction(s): itching and rash/nausea vomiting    Tamsulosin Swelling     Swelling of legs    Tape Rash     Tears skin off  coban with Tegaderm tape ok intermittently  RXN=ongoing    Wound Dressing Adhesive Hives     By pt report    Ampicillin Rash     Pt reports that she received a rash     Ciprofloxacin Rash  "         Keflex Rash     Pt states she gets a rash with this medication  Tolerates ceftriaxone  Can take with Benadryl    Levofloxacin Unspecified     Leg muscle cramps    Metronidazole Rash     \"Vision problems\"    Penicillins Hives     Can take with Benadryl    Sulfa Drugs Itching and Myalgia     Muscle pain and weakness    Valproic Acid Rash     .       PHYSICAL EXAM  VITAL SIGNS: BP (!) 163/97   Pulse 99   Temp 36.8 °C (98.2 °F) (Temporal)   Resp 20   Wt 112 kg (246 lb 11.1 oz)   SpO2 99%   BMI 42.35 kg/m²    Constitutional: Elevated BMI, Well developed, well nourished; No acute distress; Non-toxic appearance.   HENT: Normocephalic, atraumatic; Bilateral external ears normal; Oropharynx with moist mucous membranes; No erythema or exudates in the posterior oropharynx.   Eyes: PERRL, EOMI, Conjunctiva normal. No discharge.   Neck:  Supple, nontender midline; No stridor; No nuchal rigidity.   Lymphatic: No cervical lymphadenopathy noted.   Cardiovascular: Regular rate and rhythm without murmurs, rubs, or gallop.   Thorax & Lungs: No respiratory distress, breath sounds clear to auscultation bilaterally without wheezing, rales or rhonchi. Nontender chest wall. No crepitus or subcutaneous air  Abdomen: Soft, nontender, bowel sounds normal. No obvious masses; No pulsatile masses; no rebound, guarding, or peritoneal signs.   Skin: Good color; warm and dry without rash or petechia.  Back: Nontender, No CVA tenderness.   Extremities: Distal radial, dorsalis pedis, posterior tibial pulses are equal bilaterally; No edema to bilateral lower extremities; Nontender calves or saphenous, No cyanosis, No clubbing.   Musculoskeletal: Good range of motion in all major joints. No tenderness to palpation or major deformities noted.   Neurologic: Alert & oriented x 4, clear speech.     LABS/RADIOLOGY/PROCEDURES  Results for orders placed or performed during the hospital encounter of 11/20/22   HCG QUAL SERUM   Result Value Ref " Range    Beta-Hcg Qualitative Serum Negative Negative   URINALYSIS (UA)    Specimen: Urine   Result Value Ref Range    Color Yellow     Character Clear     Specific Gravity 1.003 <1.035    Ph 6.0 5.0 - 8.0    Glucose Negative Negative mg/dL    Ketones Negative Negative mg/dL    Protein Negative Negative mg/dL    Bilirubin Negative Negative    Urobilinogen, Urine 0.2 Negative    Nitrite Negative Negative    Leukocyte Esterase Negative Negative    Occult Blood Negative Negative    Micro Urine Req see below    CBC WITH DIFFERENTIAL   Result Value Ref Range    WBC 5.8 4.8 - 10.8 K/uL    RBC 4.47 4.20 - 5.40 M/uL    Hemoglobin 13.9 12.0 - 16.0 g/dL    Hematocrit 40.2 37.0 - 47.0 %    MCV 89.9 81.4 - 97.8 fL    MCH 31.1 27.0 - 33.0 pg    MCHC 34.6 33.6 - 35.0 g/dL    RDW 41.4 35.9 - 50.0 fL    Platelet Count 202 164 - 446 K/uL    MPV 11.5 9.0 - 12.9 fL    Neutrophils-Polys 52.10 44.00 - 72.00 %    Lymphocytes 35.20 22.00 - 41.00 %    Monocytes 9.60 0.00 - 13.40 %    Eosinophils 2.20 0.00 - 6.90 %    Basophils 0.90 0.00 - 1.80 %    Immature Granulocytes 0.00 0.00 - 0.90 %    Nucleated RBC 0.00 /100 WBC    Neutrophils (Absolute) 3.04 2.00 - 7.15 K/uL    Lymphs (Absolute) 2.05 1.00 - 4.80 K/uL    Monos (Absolute) 0.56 0.00 - 0.85 K/uL    Eos (Absolute) 0.13 0.00 - 0.51 K/uL    Baso (Absolute) 0.05 0.00 - 0.12 K/uL    Immature Granulocytes (abs) 0.00 0.00 - 0.11 K/uL    NRBC (Absolute) 0.00 K/uL   COMP METABOLIC PANEL   Result Value Ref Range    Sodium 140 135 - 145 mmol/L    Potassium 4.2 3.6 - 5.5 mmol/L    Chloride 107 96 - 112 mmol/L    Co2 19 (L) 20 - 33 mmol/L    Anion Gap 14.0 7.0 - 16.0    Glucose 95 65 - 99 mg/dL    Bun 13 8 - 22 mg/dL    Creatinine 0.67 0.50 - 1.40 mg/dL    Calcium 9.9 8.5 - 10.5 mg/dL    AST(SGOT) 21 12 - 45 U/L    ALT(SGPT) 32 2 - 50 U/L    Alkaline Phosphatase 64 30 - 99 U/L    Total Bilirubin 0.4 0.1 - 1.5 mg/dL    Albumin 4.5 3.2 - 4.9 g/dL    Total Protein 6.8 6.0 - 8.2 g/dL    Globulin 2.3  "1.9 - 3.5 g/dL    A-G Ratio 2.0 g/dL   proBrain Natriuretic Peptide, NT   Result Value Ref Range    NT-proBNP 26 0 - 125 pg/mL   ESTIMATED GFR   Result Value Ref Range    GFR (CKD-EPI) 118 >60 mL/min/1.73 m 2     *Note: Due to a large number of results and/or encounters for the requested time period, some results have not been displayed. A complete set of results can be found in Results Review.          DX-CHEST-PORTABLE (1 VIEW)   Final Result         1.  No acute cardiopulmonary disease.          COURSE & MEDICAL DECISION MAKING  Pertinent Labs & Imaging studies reviewed. (See chart for details)    7:14 PM - Patient seen and examined at bedside. I informed the patient that she does not have any leg swelling. Discussed plan of care, including lab work to evaluate. Patient agrees to the plan of care. Ordered for CMP, CBC w/ Diff, UA, HCG Qual Serum, Estimated GFR, ProBNP, and CXR to evaluate her symptoms.     9:05 PM - Patient was reevaluated at bedside. Discussed lab and radiology results with the patient and informed them that she likely has acute dehydration. I also told her to to follow up with their regular doctor within the next 1-2 days. Additionally, Kristin is to return to the ER for any problems or concerns. I then informed her for plans for discharge. Patient had the opportunity to ask any questions. The plan for discharge was discussed with them and she was told to return for any new or worsening symptoms. She was also informed of the plans for follow up. Patient is understanding and agreeable to the plan for discharge.    Patient presents to the ER complaining of weight gain and bloating which began today.  She said despite drinking fluids, she is only urinated twice.  She is concerned she is retaining fluid.  She said the last time this happened to her she had to \"be in nephrology.\"  Patient is well-known to this emergency department.  She comes in frequently for various complaints.  She feels a " bloating and fullness in her abdomen but no abdominal pain.  Her abdominal exam is soft and nontender.  Despite describing a feeling of swelling in her legs she has no pedal or pretibial edema.  She is urinating here in the ER.  Urine is clear.  No evidence of UTI.  No proteinuria.  Her renal function is normal.  BNP is normal.  Chest x-ray does not reveal any fluid overload.  She is not in any distress.  Patient is drinking.  She is urinating.  She does not have any signs of fluid retention.  Her abdomen is soft and nontender.  I do not think she needs any abdominal imaging.  At this time I think she is safe and stable for outpatient management and discharge home.  She has been given strict return precautions and discharge instructions and she understands treatment plan and follow-up.    The patient will return for new or worsening symptoms and is stable at the time of discharge.    The patient is referred to a primary physician for blood pressure management, diabetic screening, and for all other preventative health concerns.    DISPOSITION:  Patient will be discharged home in stable condition.    FOLLOW UP:  Torres Brody M.D.  [address]  [place] [address]  [phone]    Schedule an appointment as soon as possible for a visit in 2 days  If symptoms worsen return to ER      OUTPATIENT MEDICATIONS:  New Prescriptions    No medications on file        FINAL IMPRESSION  1. Dehydration Acute         Rocco RODRIGUEZ (Eva), am scribing for, and in the presence of, Lien Fox M.D..    Electronically signed by: Rocco Espinosa), 11/20/2022    Lien RODRIGUEZ M.D. personally performed the services described in this documentation, as scribed by Rocco Katz in my presence, and it is both accurate and complete.    This dictation has been created using voice recognition software. The accuracy of the dictation is limited by the abilities of the software. I expect there may be some  errors of grammar and possibly content. I made every attempt to manually correct the errors within my dictation. However, errors related to voice recognition software may still exist and should be interpreted within the appropriate context. C    The note accurately reflects work and decisions made by me.  Lien Fox M.D.  11/21/2022  1:07 AM

## 2022-11-23 NOTE — TELEPHONE ENCOUNTER
Records requested and re-faxed from both [facility] and [facility] per SW. Fax confirmation received and sent to [facility] for scanning.

## 2022-11-30 NOTE — ASSESSMENT & PLAN NOTE
Recently on bactrim and reports loose watery diarrhea. Electrolytes stable.  C.diff negative.  Continue Loperamide prn.   none

## 2022-12-08 ENCOUNTER — PATIENT MESSAGE (OUTPATIENT)
Dept: CARDIOLOGY | Facility: MEDICAL CENTER | Age: 32
End: 2022-12-08
Payer: MEDICARE

## 2022-12-08 DIAGNOSIS — I47.10 SVT (SUPRAVENTRICULAR TACHYCARDIA) (HCC): ICD-10-CM

## 2022-12-08 DIAGNOSIS — R00.2 PALPITATIONS: ICD-10-CM

## 2022-12-08 DIAGNOSIS — I47.11 INAPPROPRIATE SINUS TACHYCARDIA (HCC): ICD-10-CM

## 2022-12-09 NOTE — TELEPHONE ENCOUNTER
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 Pain sharp, can pinpoint just under left breast. She states her BP was 160/90 when she got home today, now at 147/90, but pain has only subsided a little, BP \"only high when in pain\". She says this instance is \"exactly\" like when she last came into see  and was sent to ER. She does not want to go to ER, says her grandmother will not allow it, as they only run expensive tests and nothing wrong found. Pt denies SOB, no pressure in chest, she took 4 ASA and her Corlanor twice today since she has been home today.   Pt says they told her her \"cartilage is inflamed\" at ER, have told her this multiple times, no cardiac issues found. Pt unsure what to do, again refusing ER.   Please advise.  "

## 2022-12-09 NOTE — PROGRESS NOTES
Fortunately so far there is been no evidence of heart damage related to her chest pain problems but may be related to tachycardia therefore would like to give a trial of metoprolol extended release starting at 25 mg, could start with half a tablet 12.5 mg and then go up to 25 mg, the medicine could be taken at any time in the morning or evening.  If agreeable please send in a prescription for metoprolol ER 25 mg daily and hopefully that will give some relief

## 2022-12-10 ENCOUNTER — HOSPITAL ENCOUNTER (EMERGENCY)
Facility: MEDICAL CENTER | Age: 32
End: 2022-12-10
Attending: EMERGENCY MEDICINE
Payer: MEDICARE

## 2022-12-10 ENCOUNTER — APPOINTMENT (OUTPATIENT)
Dept: RADIOLOGY | Facility: MEDICAL CENTER | Age: 32
End: 2022-12-10
Attending: EMERGENCY MEDICINE
Payer: MEDICARE

## 2022-12-10 VITALS
TEMPERATURE: 98.2 F | RESPIRATION RATE: 20 BRPM | DIASTOLIC BLOOD PRESSURE: 62 MMHG | WEIGHT: 237 LBS | HEART RATE: 76 BPM | SYSTOLIC BLOOD PRESSURE: 139 MMHG | BODY MASS INDEX: 40.68 KG/M2 | OXYGEN SATURATION: 97 %

## 2022-12-10 DIAGNOSIS — R51.9 ACUTE NONINTRACTABLE HEADACHE, UNSPECIFIED HEADACHE TYPE: ICD-10-CM

## 2022-12-10 DIAGNOSIS — R53.1 WEAKNESS: ICD-10-CM

## 2022-12-10 LAB
ABO GROUP BLD: NORMAL
ALBUMIN SERPL BCP-MCNC: 4.5 G/DL (ref 3.2–4.9)
ALBUMIN/GLOB SERPL: 1.8 G/DL
ALP SERPL-CCNC: 62 U/L (ref 30–99)
ALT SERPL-CCNC: 25 U/L (ref 2–50)
ANION GAP SERPL CALC-SCNC: 12 MMOL/L (ref 7–16)
APTT PPP: 29.4 SEC (ref 24.7–36)
AST SERPL-CCNC: 26 U/L (ref 12–45)
BASOPHILS # BLD AUTO: 0.7 % (ref 0–1.8)
BASOPHILS # BLD: 0.05 K/UL (ref 0–0.12)
BILIRUB SERPL-MCNC: 0.4 MG/DL (ref 0.1–1.5)
BLD GP AB SCN SERPL QL: NORMAL
BUN SERPL-MCNC: 13 MG/DL (ref 8–22)
CALCIUM SERPL-MCNC: 9.5 MG/DL (ref 8.5–10.5)
CHLORIDE SERPL-SCNC: 107 MMOL/L (ref 96–112)
CO2 SERPL-SCNC: 20 MMOL/L (ref 20–33)
CREAT SERPL-MCNC: 0.8 MG/DL (ref 0.5–1.4)
EKG IMPRESSION: NORMAL
EOSINOPHIL # BLD AUTO: 0.16 K/UL (ref 0–0.51)
EOSINOPHIL NFR BLD: 2.2 % (ref 0–6.9)
ERYTHROCYTE [DISTWIDTH] IN BLOOD BY AUTOMATED COUNT: 41.3 FL (ref 35.9–50)
GFR SERPLBLD CREATININE-BSD FMLA CKD-EPI: 100 ML/MIN/1.73 M 2
GLOBULIN SER CALC-MCNC: 2.5 G/DL (ref 1.9–3.5)
GLUCOSE SERPL-MCNC: 103 MG/DL (ref 65–99)
HCT VFR BLD AUTO: 42.7 % (ref 37–47)
HGB BLD-MCNC: 14.7 G/DL (ref 12–16)
IMM GRANULOCYTES # BLD AUTO: 0.03 K/UL (ref 0–0.11)
IMM GRANULOCYTES NFR BLD AUTO: 0.4 % (ref 0–0.9)
INR PPP: 1 (ref 0.87–1.13)
LYMPHOCYTES # BLD AUTO: 2.26 K/UL (ref 1–4.8)
LYMPHOCYTES NFR BLD: 31.4 % (ref 22–41)
MCH RBC QN AUTO: 31.1 PG (ref 27–33)
MCHC RBC AUTO-ENTMCNC: 34.4 G/DL (ref 33.6–35)
MCV RBC AUTO: 90.3 FL (ref 81.4–97.8)
MONOCYTES # BLD AUTO: 0.66 K/UL (ref 0–0.85)
MONOCYTES NFR BLD AUTO: 9.2 % (ref 0–13.4)
NEUTROPHILS # BLD AUTO: 4.03 K/UL (ref 2–7.15)
NEUTROPHILS NFR BLD: 56.1 % (ref 44–72)
NRBC # BLD AUTO: 0 K/UL
NRBC BLD-RTO: 0 /100 WBC
PLATELET # BLD AUTO: 186 K/UL (ref 164–446)
PMV BLD AUTO: 12 FL (ref 9–12.9)
POTASSIUM SERPL-SCNC: 4.1 MMOL/L (ref 3.6–5.5)
PROT SERPL-MCNC: 7 G/DL (ref 6–8.2)
PROTHROMBIN TIME: 13.1 SEC (ref 12–14.6)
RBC # BLD AUTO: 4.73 M/UL (ref 4.2–5.4)
RH BLD: NORMAL
SODIUM SERPL-SCNC: 139 MMOL/L (ref 135–145)
TROPONIN T SERPL-MCNC: <6 NG/L (ref 6–19)
WBC # BLD AUTO: 7.2 K/UL (ref 4.8–10.8)

## 2022-12-10 PROCEDURE — 96375 TX/PRO/DX INJ NEW DRUG ADDON: CPT

## 2022-12-10 PROCEDURE — 96366 THER/PROPH/DIAG IV INF ADDON: CPT

## 2022-12-10 PROCEDURE — 86850 RBC ANTIBODY SCREEN: CPT

## 2022-12-10 PROCEDURE — 99285 EMERGENCY DEPT VISIT HI MDM: CPT

## 2022-12-10 PROCEDURE — 85610 PROTHROMBIN TIME: CPT

## 2022-12-10 PROCEDURE — 94760 N-INVAS EAR/PLS OXIMETRY 1: CPT

## 2022-12-10 PROCEDURE — 71045 X-RAY EXAM CHEST 1 VIEW: CPT

## 2022-12-10 PROCEDURE — 700111 HCHG RX REV CODE 636 W/ 250 OVERRIDE (IP): Performed by: NURSE PRACTITIONER

## 2022-12-10 PROCEDURE — 85025 COMPLETE CBC W/AUTO DIFF WBC: CPT

## 2022-12-10 PROCEDURE — 93005 ELECTROCARDIOGRAM TRACING: CPT | Performed by: EMERGENCY MEDICINE

## 2022-12-10 PROCEDURE — 80053 COMPREHEN METABOLIC PANEL: CPT

## 2022-12-10 PROCEDURE — 85730 THROMBOPLASTIN TIME PARTIAL: CPT

## 2022-12-10 PROCEDURE — 96365 THER/PROPH/DIAG IV INF INIT: CPT

## 2022-12-10 PROCEDURE — 84484 ASSAY OF TROPONIN QUANT: CPT

## 2022-12-10 PROCEDURE — 70450 CT HEAD/BRAIN W/O DYE: CPT

## 2022-12-10 PROCEDURE — 86901 BLOOD TYPING SEROLOGIC RH(D): CPT

## 2022-12-10 PROCEDURE — 36415 COLL VENOUS BLD VENIPUNCTURE: CPT

## 2022-12-10 PROCEDURE — 86900 BLOOD TYPING SEROLOGIC ABO: CPT

## 2022-12-10 RX ORDER — PROCHLORPERAZINE MALEATE 10 MG
10 TABLET ORAL EVERY 6 HOURS PRN
Qty: 20 TABLET | Refills: 0 | Status: SHIPPED | OUTPATIENT
Start: 2022-12-10 | End: 2023-01-27

## 2022-12-10 RX ORDER — MAGNESIUM SULFATE HEPTAHYDRATE 40 MG/ML
2 INJECTION, SOLUTION INTRAVENOUS ONCE
Status: COMPLETED | OUTPATIENT
Start: 2022-12-10 | End: 2022-12-10

## 2022-12-10 RX ORDER — KETOROLAC TROMETHAMINE 30 MG/ML
30 INJECTION, SOLUTION INTRAMUSCULAR; INTRAVENOUS ONCE
Status: COMPLETED | OUTPATIENT
Start: 2022-12-10 | End: 2022-12-10

## 2022-12-10 RX ORDER — METOCLOPRAMIDE HYDROCHLORIDE 5 MG/ML
10 INJECTION INTRAMUSCULAR; INTRAVENOUS ONCE
Status: COMPLETED | OUTPATIENT
Start: 2022-12-10 | End: 2022-12-10

## 2022-12-10 RX ORDER — DIPHENHYDRAMINE HYDROCHLORIDE 50 MG/ML
25 INJECTION INTRAMUSCULAR; INTRAVENOUS ONCE
Status: COMPLETED | OUTPATIENT
Start: 2022-12-10 | End: 2022-12-10

## 2022-12-10 RX ORDER — METOPROLOL SUCCINATE 25 MG/1
25 TABLET, EXTENDED RELEASE ORAL EVERY MORNING
Status: ON HOLD | COMMUNITY
End: 2023-02-09 | Stop reason: SDUPTHER

## 2022-12-10 RX ADMIN — METOCLOPRAMIDE 10 MG: 5 INJECTION, SOLUTION INTRAMUSCULAR; INTRAVENOUS at 18:01

## 2022-12-10 RX ADMIN — DIPHENHYDRAMINE HYDROCHLORIDE 25 MG: 50 INJECTION INTRAMUSCULAR; INTRAVENOUS at 18:02

## 2022-12-10 RX ADMIN — MAGNESIUM SULFATE HEPTAHYDRATE 2 G: 40 INJECTION, SOLUTION INTRAVENOUS at 18:07

## 2022-12-10 RX ADMIN — KETOROLAC TROMETHAMINE 30 MG: 30 INJECTION, SOLUTION INTRAMUSCULAR; INTRAVENOUS at 17:59

## 2022-12-10 ASSESSMENT — PAIN DESCRIPTION - PAIN TYPE
TYPE: ACUTE PAIN
TYPE: ACUTE PAIN

## 2022-12-10 ASSESSMENT — FIBROSIS 4 INDEX: FIB4 SCORE: 0.61

## 2022-12-11 NOTE — DISCHARGE SUMMARY
"  ED Observation Discharge Summary    Patient:Kristin Balderrama  Patient : 1990  Patient MRN: [id number]  Patient PCP: Torres Brody M.D.    Admit Date: 12/10/2022  Discharge Date and Time: 12/10/22 8:59 PM  Discharge Diagnosis:   1. Acute nonintractable headache, unspecified headache type    2. Weakness        Discharge Attending: Ming Hidalgo M.D.  Discharge Service: ED Observation    ED Course  Kristin is a 32 y.o. female who was evaluated at [facility] for headache and weakness.  Patient is initially seen for headache with report of right-sided weakness, although her examination throughout her stay to be somewhat inconsistent, with discrete observation revealing that she is apparently using her right side without difficulty throughout her stay here in the ER.  She was put on observation status awaiting a migraine cocktail to include the 2-hour infusion of magnesium which was ordered by the neurology team--who has signed off on her already--after this she is feeling \"not as bad\" but \"tired.\"   CT scan returned negative, laboratories are unrevealing. Her primary concern at this time is she has no way to get home.  Interestingly, while she states that she still cannot move her leg or arm, she has no difficulty walking and no apparent difficulty moving that arm.    Nursing will be talking to social work about helping get her home.  She is given instructions on migraine.  Prescription for Compazine she may use for migraine headache.  She is to follow-up with her primary care doctor this upcoming week.    Discharge Exam:  /62   Pulse 76   Temp 36.8 °C (98.2 °F) (Temporal)   Resp 20   Wt 108 kg (237 lb)   SpO2 97%   BMI 40.68 kg/m² .    Constitutional: Awake and alert. Nontoxic  HENT: Atraumatic, mucous murmurs are moist  Eyes: No icterus  Neck: Supple  Cardiovascular: Normal heart rate   Thorax & Lungs: Breathing easily  Abdomen: Nontender  Skin:  No visible " rash  Extremities: Well perfused  Neuro: Cranial nerves are normal.  She is moving all extremities.  She is ambulating with no difficulty.  Psychiatric: Affect flat    Labs  Results for orders placed or performed during the hospital encounter of 12/10/22   CBC WITH DIFFERENTIAL   Result Value Ref Range    WBC 7.2 4.8 - 10.8 K/uL    RBC 4.73 4.20 - 5.40 M/uL    Hemoglobin 14.7 12.0 - 16.0 g/dL    Hematocrit 42.7 37.0 - 47.0 %    MCV 90.3 81.4 - 97.8 fL    MCH 31.1 27.0 - 33.0 pg    MCHC 34.4 33.6 - 35.0 g/dL    RDW 41.3 35.9 - 50.0 fL    Platelet Count 186 164 - 446 K/uL    MPV 12.0 9.0 - 12.9 fL    Neutrophils-Polys 56.10 44.00 - 72.00 %    Lymphocytes 31.40 22.00 - 41.00 %    Monocytes 9.20 0.00 - 13.40 %    Eosinophils 2.20 0.00 - 6.90 %    Basophils 0.70 0.00 - 1.80 %    Immature Granulocytes 0.40 0.00 - 0.90 %    Nucleated RBC 0.00 /100 WBC    Neutrophils (Absolute) 4.03 2.00 - 7.15 K/uL    Lymphs (Absolute) 2.26 1.00 - 4.80 K/uL    Monos (Absolute) 0.66 0.00 - 0.85 K/uL    Eos (Absolute) 0.16 0.00 - 0.51 K/uL    Baso (Absolute) 0.05 0.00 - 0.12 K/uL    Immature Granulocytes (abs) 0.03 0.00 - 0.11 K/uL    NRBC (Absolute) 0.00 K/uL   COMP METABOLIC PANEL   Result Value Ref Range    Sodium 139 135 - 145 mmol/L    Potassium 4.1 3.6 - 5.5 mmol/L    Chloride 107 96 - 112 mmol/L    Co2 20 20 - 33 mmol/L    Anion Gap 12.0 7.0 - 16.0    Glucose 103 (H) 65 - 99 mg/dL    Bun 13 8 - 22 mg/dL    Creatinine 0.80 0.50 - 1.40 mg/dL    Calcium 9.5 8.5 - 10.5 mg/dL    AST(SGOT) 26 12 - 45 U/L    ALT(SGPT) 25 2 - 50 U/L    Alkaline Phosphatase 62 30 - 99 U/L    Total Bilirubin 0.4 0.1 - 1.5 mg/dL    Albumin 4.5 3.2 - 4.9 g/dL    Total Protein 7.0 6.0 - 8.2 g/dL    Globulin 2.5 1.9 - 3.5 g/dL    A-G Ratio 1.8 g/dL   PROTHROMBIN TIME   Result Value Ref Range    PT 13.1 12.0 - 14.6 sec    INR 1.00 0.87 - 1.13   APTT   Result Value Ref Range    APTT 29.4 24.7 - 36.0 sec   COD (ADULT)   Result Value Ref Range    ABO Grouping Only O      Rh Grouping Only POS     Antibody Screen-Cod NEG    TROPONIN   Result Value Ref Range    Troponin T <6 6 - 19 ng/L   ESTIMATED GFR   Result Value Ref Range    GFR (CKD-EPI) 100 >60 mL/min/1.73 m 2   EKG (NOW)   Result Value Ref Range    Report       [facility] Emergency Dept.    Test Date:  2022-12-10  Pt Name:    HARLEY DE LA CRUZ              Department: ER  MRN:        [id number]                      Room:       RD 11  Gender:     Female                       Technician: [id number]  :        1990                   Requested By:MING HIDALGO  Order #:    [id number]                    Reading MD:    Measurements  Intervals                                Axis  Rate:       78                           P:          16  OH:         126                          QRS:        24  QRSD:       104                          T:          20  QT:         403  QTc:        460    Interpretive Statements  Sinus rhythm  Borderline T wave abnormalities  Compared to ECG 11/10/2022 14:03:27  T-wave abnormality now present       *Note: Due to a large number of results and/or encounters for the requested time period, some results have not been displayed. A complete set of results can be found in Results Review.       Radiology  DX-CHEST-PORTABLE (1 VIEW)   Final Result      No acute cardiopulmonary abnormality.         CT-HEAD W/O   Final Result      No CT evidence of acute infarct, hemorrhage or mass.             Disposition: To home    Follow up: Dr. Brody this upcoming week.    Medications:   New Prescriptions    PROCHLORPERAZINE (COMPAZINE) 10 MG TAB    Take 1 Tablet by mouth every 6 hours as needed (for migraine headache).       Discharge Condition: Stable    Electronically signed by: Ming Hidalgo M.D., 12/10/2022 8:59 PM

## 2022-12-11 NOTE — PROGRESS NOTES
Brief Neurology Note    32-year old female well known to our neurology service (innumerable hospital/neurology encounters within the past several years with ultimately unrevealing work-up) with PMHx CRION syndrome, transverse myelitis (remotely), disabled at baseline however ambulatory, bladder stimulator implanted, benign intracranial hypertension without papilledema (on Diamox), migraine, fibromyalgia and suspected underlying psychiatric component of the above who again presented to [facility] On 12/10/22 for a chief complaint of headache and Right sided weakness. Patient was at home when she suddenly developed the above noted symptoms as well as nausea. EMS called; on arrival here Stroke Alert called. Per my initial evaluation, patient has pressured speech, admits to BL facial weakness, RUE with forced drift and give way weakness, positive hoovers sign on the Right.     I suspect complex migraine versus functional etiology of the above versus multi factorial; have exceedingly low suspicion for stroke; OK to obtain CT head. Will give one time doses Mg 2g IV, Reglan 10 mg IV, Toradol 30 mg IV, and Diphenhydramine 25 mg IV and reassess for improvement in symptoms. Please re-consult neurology if symptoms do not improve following the above.     Cande Jennings, [email or web address].

## 2022-12-11 NOTE — ED NOTES
Assumed patient care, received report from SONYA Esteban, on stretcher, alert x 4.Verified patient identification. Placed on patient room. Changed clothes to hospital gown. Connected to cardiac monitor. Will continue to monitor for any complications.    With intact IV cannula gauge 20 right Antecubital area. Vitals stable, saturating well on room air.

## 2022-12-11 NOTE — ED NOTES
Complaining of headache, back pain, nausea, and right sided body weakness since 30-40 minutes before she arrived

## 2022-12-11 NOTE — ED PROVIDER NOTES
"ED Provider Note    CHIEF COMPLAINT  Chief Complaint   Patient presents with    Possible Stroke     Pt reports R sided deficits with facial droop and  visual deficit. Pt reports symptoms began @ 1645       HPI  Kristin Balderrama is a 32 y.o. female who presents planing of right-sided weakness and numbness.  This is acute in onset about 30 minutes ago.  Associated with a headache that she has \"all over.\"  She cannot further describe it.  She apparently has a history of transverse myelitis.  I see an MRI from June which showed a partially empty sella but otherwise negative.  She says she cannot move the right side of her body.  She denies any fever or chills.  No recent illness or trauma.  No chest pain or shortness of breath presently.  She has been seen here in the ER about 2 dozen times this calendar year for a variety of complaints.  She is admitted to the hospital for intractable headache before.  Denies change in bowel or bladder.  There is no other complaint.    PAST MEDICAL HISTORY  Past Medical History:   Diagnosis Date    Abdominal pain     Anginal syndrome     random chest pain especially with tachycardia    Apnea, sleep     Arrhythmia     \"sinus tachycardia\", cariologist, Dr. Kumar; ablation 2/2016    Arthritis     osteo    ASTHMA     when around smoke    Back pain     Borderline personality disorder (HCC)     Breath shortness     with tachycardia    Bronchitis 02/08/2022    Last time was 12/21    Cardiac arrhythmia     Chickenpox     Chronic UTI 09/18/2010    Cough     Daytime sleepiness     Dental disorder 03/08/2021    infected tooth    Depression     Diabetes (HCC)     Diarrhea     incontinece    Disorder of thyroid     Hashimoto's    Fall     Fatigue     Frequent headaches     Gasping for breath     Gynecological disorder     PCOS    Headache(784.0)     Heart burn     Heart murmur     History of falling     Indigestion     Migraine     Mitochondrial disease (HCC)     Multiple personality disorder " "(HCC)     Nausea     Obesity     Other fatigue 06/29/2020    Pain 08/15/2012    back, 7/10    Painful joint     PCOS (polycystic ovarian syndrome)     Pneumonia 2012 12/2020    Psychosis ([facility])     Ringing in ears     Scoliosis     Shortness of breath     O2 as needed    Sinus tachycardia 10/31/2013    Sleep apnea     CPAP \"pulmonary doctor took me off mid year 2016\"    Snoring     Tonsillitis     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HCC)     2/8/22: Per pt: not anymore    Tuberculosis     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    Urinary bladder disorder     Suprapubic cath. 2/8/22: Not anymore.     Urinary incontinence     Weakness     Wears glasses        FAMILY HISTORY  Family History   Problem Relation Age of Onset    Hypertension Mother     Sleep Apnea Mother     Heart Disease Mother     Other Mother         hypothryod    Hypertension Maternal Uncle     Heart Disease Maternal Grandmother     Hypertension Maternal Grandmother     No Known Problems Sister     Other Sister         Narcolepsy;fibromyalsia;bone;nerve    Genitourinary () Problems Sister         endometriosis       SOCIAL HISTORY  Social History     Tobacco Use    Smoking status: Never    Smokeless tobacco: Never   Vaping Use    Vaping Use: Never used   Substance Use Topics    Alcohol use: No     Alcohol/week: 0.0 oz    Drug use: Not Currently     Frequency: 7.0 times per week     Types: Marijuana       SURGICAL HISTORY  Past Surgical History:   Procedure Laterality Date    FL LAP,DIAGNOSTIC ABDOMEN  2/14/2022    Procedure: LAPAROSCOPY;  Surgeon: Seamus Pisano M.D.;  Location: SURGERY SAME DAY [facility];  Service: Gynecology    OVARIAN CYSTECTOMY Right 2/14/2022    Procedure: EXCISION, CYST, OVARY;  Surgeon: Seamus Pisano M.D.;  Location: SURGERY SAME DAY [facility];  Service: Gynecology    BOWEL STIMULATOR INSERTION  3/10/2021    Procedure: INSERTION, ELECTRODE LEADS AND PULSE GENERATOR, NEUROSTIMULATOR, SACRAL - REMOVAL OF INTERSTIM WITH REPLACEMENT OF " SACRAL NEUROMODULATION DEVICE;  Surgeon: Joe Noyola M.D.;  Location: SURGERY [facility];  Service: General    MUSCLE BIOPSY Right 1/26/2017    Procedure: MUSCLE BIOPSY - THIGH;  Surgeon: Isidro Vigil M.D.;  Location: SURGERY [facility];  Service:     GASTROSCOPY WITH BALLOON DILATATION N/A 1/4/2017    Procedure: GASTROSCOPY WITH DILATATION;  Surgeon: Torres Vargas M.D.;  Location: SURGERY [facility];  Service:     BOWEL STIMULATOR INSERTION  7/13/2016    Procedure: BOWEL STIMULATOR INSERTION FOR PERMANENT INTERSTIM SACRAL IMPLANT;  Surgeon: Joe Noyola M.D.;  Location: SURGERY [facility];  Service:     RECOVERY  1/27/2016    Procedure: CATH LAB EP STUDY WITH SINUS NODE MODIFICATION LA;  Surgeon: Recoveryonly Surgery;  [place] [facility]  Service:     OTHER CARDIAC SURGERY  1/2016    cardiac ablation    NEURO DEST FACET L/S W/IG SNGL  4/21/2015    Performed by Reza Tabor at SURGERY [facility]    LUMBAR FUSION ANTERIOR  8/21/2012    Performed by SUSIE SAWANT at SURGERY [facility]    ALYSSA BY LAPAROSCOPY  8/29/2010    Performed by SHAYY JOHNS at SURGERY [facility]    LAMINOTOMY      OTHER ABDOMINAL SURGERY      TONSILLECTOMY      tonsillectomy       CURRENT MEDICATIONS    I have reviewed the nurses notes and/or the list brought with the patient.    ALLERGIES  Allergies   Allergen Reactions    Depakote [Divalproex Sodium] Unspecified     Muscle spasms/muscle pain and weakness      Doxycycline Anaphylaxis and Vomiting     Other reaction(s): pustules/blisters    Montelukast [Singulair] Unspecified     Cardiac effusion    Vancomycin Itching     Pt becomes flushed in face and chest.   RXN=7/10/16    Amitriptyline Unspecified     Headaches      Aripiprazole [Abilify] Unspecified     Headaches/muscle twitching      Clindamycin Nausea           Flomax [Tamsulosin Hydrochloride] Swelling    Metformin Unspecified     Increased lactic acid     Other  "reaction(s): itching and rash/nausea vomiting    Tamsulosin Swelling     Swelling of legs    Tape Rash     Tears skin off  coban with Tegaderm tape ok intermittently  RXN=ongoing    Wound Dressing Adhesive Hives     By pt report    Ampicillin Rash     Pt reports that she received a rash     Ciprofloxacin Rash          Keflex Rash     Pt states she gets a rash with this medication  Tolerates ceftriaxone  Can take with Benadryl    Levofloxacin Unspecified     Leg muscle cramps    Metronidazole Rash     \"Vision problems\"    Penicillins Hives     Can take with Benadryl    Sulfa Drugs Itching and Myalgia     Muscle pain and weakness    Valproic Acid Rash     .       REVIEW OF SYSTEMS  See HPI for further details. Review of systems as above, otherwise all other systems are negative.     PHYSICAL EXAM  VITAL SIGNS: BP (!) 155/82   Pulse 81   Temp 37.7 °C (99.8 °F) (Temporal)   Resp 18   Wt 108 kg (237 lb)   SpO2 98%   BMI 40.68 kg/m²    Constitutional: Well appearing patient in no acute distress.  Not toxic, nor ill in appearance.  HENT: Mucus membranes moist.  Oropharynx is clear.  Eyes: Pupils equally round.  No scleral icterus.   Neck: Full nontender range of motion.  Lymphatic: No cervical lymphadenopathy noted.   Cardiovascular: Regular heart rate and rhythm.  No murmurs, rubs, nor gallop appreciated.   Thorax & Lungs: Chest is nontender.  Lungs are clear to auscultation with good air movement bilaterally.  No wheeze, rhonchi, nor rales.   Abdomen: Soft, with no tenderness, rebound nor guarding.  No mass, pulsatile mass, nor hepatosplenomegaly appreciated.  Skin: No purpura nor petechia noted.  Extremities/Musculoskeletal: No sign of trauma.  Calves are nontender with no cords nor edema.  No Dhiraj's sign.  Pulses are intact all around.   Neurologic: Alert & oriented.  She has her jaws clenched together, will not range her face.  She has right-sided weakness and numbness in both her arm and leg, however this is " somewhat distractible to a degree.  Psychiatric: Normal affect appropriate for the clinical situation.    EKG  I interpreted this EKG myself.  This is a 12-lead study.  The rhythm is sinus with a rate of 78.  There are no ST segment nor T wave abnormalities.  Interpretation: No ST segment elevation myocardial infarction.      RADIOLOGY/PROCEDURES  I have reviewed the patient's film interpretations myself, and they are read out by the radiologist as:   DX-CHEST-PORTABLE (1 VIEW)   Final Result      No acute cardiopulmonary abnormality.         CT-HEAD W/O   Final Result      No CT evidence of acute infarct, hemorrhage or mass.           .    MEDICAL RECORD  I have reviewed patient's medical record and pertinent results are listed above.    COURSE & MEDICAL DECISION MAKING  I have reviewed any medical record information, laboratory studies and radiographic results as noted above.  This patient presents with acute headache and right-sided symptoms.  However, this is somewhat distractible and I am concerned about the possibility of a functional component.  Although low suspicion, stroke is a possibility and I have activated a stroke protocol.  Case was discussed with the responding stroke provider who suspected complex migraine versus functional etiology versus multifactorial.  She had exceedingly low suspicion for stroke.  She agreed with obtaining a head CT which was obtained and negative.  She has a ordered magnesium, Reglan Toradol and diphenhydramine.  They are signing off.  This was discussed with the patient.  Upon reevaluation, she appears to be doing better.  As we are waiting for the medications to be given and to evaluate the efficacy, the patient is placed on the ED observation status at 1814.      Working IMPRESSION  Headache  Right-sided numbness and weakness     This dictation was created using voice recognition software.    Electronically signed by: Ming Hidalgo M.D., 12/10/2022 5:26 PM

## 2022-12-11 NOTE — ED TRIAGE NOTES
Chief Complaint   Patient presents with    Possible Stroke     Pt reports R sided deficits with facial droop and  visual deficit. Pt reports symptoms began @ 1645     Pt BIB [facility] from home for H/A and nausea. Upon arrival, pt exhibited the above symptoms.

## 2022-12-11 NOTE — ED NOTES
Med rec updated and complete.  Allergies reviewed. Confirmed name and date of birth. Pt denies antibiotic use in last 30 days.        Home pharmacy [facility] [phone]

## 2022-12-11 NOTE — ED NOTES
Patient remains in bed in no acute distress, no changes in patient condition. Magnesium almost finished infusing, RN discussed plan for patient with ERP.

## 2022-12-11 NOTE — ED NOTES
Patient ambulated to bathroom. Patient stated that she can't move her right arm or leg, however patient walked on her right leg without any evidence that she couldn't support her body weight and frequently moved her right arm to hold onto walls and other objects. No evidence that patient is unable to ambulate safely.

## 2022-12-11 NOTE — ED NOTES
Checked on bed, connected to monitor,  with unlabored respirations. Vital signs is stable. Denied any new complaints. Gurney in low position, side rail up for pt safety. Call light within reach. Will continue to monitor for any complications.

## 2022-12-11 NOTE — ED NOTES
PIV removed. Patient given discharge instructions and verbalized understanding appropriately, denies any further questions or concerns at this time. Patient is alert and oriented and ambulates with a steady gait. Patient being given some time to dress herself, after which patient will be discharged to self.

## 2022-12-16 ENCOUNTER — HOSPITAL ENCOUNTER (OUTPATIENT)
Dept: RADIOLOGY | Facility: MEDICAL CENTER | Age: 32
End: 2022-12-16
Attending: PHYSICIAN ASSISTANT
Payer: MEDICARE

## 2022-12-16 DIAGNOSIS — M25.512 ACUTE PAIN OF LEFT SHOULDER: ICD-10-CM

## 2022-12-19 ENCOUNTER — HOSPITAL ENCOUNTER (OUTPATIENT)
Dept: LAB | Facility: MEDICAL CENTER | Age: 32
End: 2022-12-19
Attending: CLINICAL NURSE SPECIALIST
Payer: MEDICARE

## 2022-12-19 PROCEDURE — 83013 H PYLORI (C-13) BREATH: CPT

## 2022-12-20 LAB — UREA BREATH TEST QL: NEGATIVE

## 2022-12-22 RX ORDER — METOPROLOL SUCCINATE 25 MG/1
25 TABLET, EXTENDED RELEASE ORAL DAILY
Qty: 30 TABLET | Refills: 3 | Status: SHIPPED | OUTPATIENT
Start: 2022-12-22 | End: 2023-01-17

## 2022-12-26 ENCOUNTER — HOSPITAL ENCOUNTER (OUTPATIENT)
Dept: CARDIOLOGY | Facility: MEDICAL CENTER | Age: 32
End: 2022-12-26
Attending: INTERNAL MEDICINE
Payer: MEDICARE

## 2022-12-26 DIAGNOSIS — R07.2 CHEST PAIN, PRECORDIAL: ICD-10-CM

## 2022-12-26 DIAGNOSIS — I47.11 INAPPROPRIATE SINUS TACHYCARDIA (HCC): ICD-10-CM

## 2022-12-26 PROCEDURE — 93306 TTE W/DOPPLER COMPLETE: CPT

## 2022-12-27 LAB — LV EJECT FRACT  99904: 55

## 2022-12-27 PROCEDURE — 93306 TTE W/DOPPLER COMPLETE: CPT | Mod: 26 | Performed by: INTERNAL MEDICINE

## 2023-01-14 DIAGNOSIS — R00.2 PALPITATIONS: ICD-10-CM

## 2023-01-14 DIAGNOSIS — I47.10 SVT (SUPRAVENTRICULAR TACHYCARDIA) (HCC): ICD-10-CM

## 2023-01-14 DIAGNOSIS — I47.11 INAPPROPRIATE SINUS TACHYCARDIA (HCC): ICD-10-CM

## 2023-01-16 NOTE — TELEPHONE ENCOUNTER
Is the patient due for a refill? No    Was the patient seen the past year? Yes    Date of last office visit: 11/10/022    Does the patient have an upcoming appointment?  Yes   If yes, When? 2/9/2023    Provider to refill:KRYSTIAN    Does the patients insurance require a 100 day supply?  No

## 2023-01-17 RX ORDER — METOPROLOL SUCCINATE 25 MG/1
TABLET, EXTENDED RELEASE ORAL
Qty: 90 TABLET | Refills: 3 | Status: SHIPPED | OUTPATIENT
Start: 2023-01-17 | End: 2023-01-27

## 2023-01-19 NOTE — ED NOTES
Med Rec Updated and Complete per Pt at bedside   Allergies Reviewed    Pt recently started a 3-day course of Bactrim DS twice daily on 12/09/19 Ending 12/11/19. Pt also started on a course of Augmentin 875-125 mg twice daily but stopped after 2 doses on 12/05/19.    Pt also on a 5-day course of Prednisone 20mg (60mg once daily) Starting 12/09/19 Ending 12/13/19.    Pt reports she does not have her Corlanor on her person.       operating room

## 2023-01-20 ENCOUNTER — HOSPITAL ENCOUNTER (OUTPATIENT)
Dept: LAB | Facility: MEDICAL CENTER | Age: 33
End: 2023-01-20
Attending: INTERNAL MEDICINE
Payer: MEDICARE

## 2023-01-20 LAB
ALBUMIN SERPL BCP-MCNC: 4.4 G/DL (ref 3.2–4.9)
ALBUMIN/GLOB SERPL: 2.1 G/DL
ALP SERPL-CCNC: 68 U/L (ref 30–99)
ALT SERPL-CCNC: 24 U/L (ref 2–50)
ANION GAP SERPL CALC-SCNC: 15 MMOL/L (ref 7–16)
AST SERPL-CCNC: 14 U/L (ref 12–45)
BILIRUB SERPL-MCNC: 0.3 MG/DL (ref 0.1–1.5)
BUN SERPL-MCNC: 18 MG/DL (ref 8–22)
CALCIUM ALBUM COR SERPL-MCNC: 9.1 MG/DL (ref 8.5–10.5)
CALCIUM SERPL-MCNC: 9.4 MG/DL (ref 8.5–10.5)
CHLORIDE SERPL-SCNC: 109 MMOL/L (ref 96–112)
CO2 SERPL-SCNC: 16 MMOL/L (ref 20–33)
CREAT SERPL-MCNC: 0.63 MG/DL (ref 0.5–1.4)
EST. AVERAGE GLUCOSE BLD GHB EST-MCNC: 100 MG/DL
FASTING STATUS PATIENT QL REPORTED: NORMAL
GFR SERPLBLD CREATININE-BSD FMLA CKD-EPI: 120 ML/MIN/1.73 M 2
GLOBULIN SER CALC-MCNC: 2.1 G/DL (ref 1.9–3.5)
GLUCOSE SERPL-MCNC: 107 MG/DL (ref 65–99)
HBA1C MFR BLD: 5.1 % (ref 4–5.6)
POTASSIUM SERPL-SCNC: 4.2 MMOL/L (ref 3.6–5.5)
PROT SERPL-MCNC: 6.5 G/DL (ref 6–8.2)
SODIUM SERPL-SCNC: 140 MMOL/L (ref 135–145)
TSH SERPL DL<=0.005 MIU/L-ACNC: 3.7 UIU/ML (ref 0.38–5.33)

## 2023-01-20 PROCEDURE — 84443 ASSAY THYROID STIM HORMONE: CPT

## 2023-01-20 PROCEDURE — 80053 COMPREHEN METABOLIC PANEL: CPT

## 2023-01-20 PROCEDURE — 83036 HEMOGLOBIN GLYCOSYLATED A1C: CPT | Mod: GA

## 2023-01-20 PROCEDURE — 36415 COLL VENOUS BLD VENIPUNCTURE: CPT

## 2023-01-26 ENCOUNTER — APPOINTMENT (OUTPATIENT)
Dept: RADIOLOGY | Facility: MEDICAL CENTER | Age: 33
End: 2023-01-26
Attending: PHYSICIAN ASSISTANT
Payer: MEDICARE

## 2023-01-27 ENCOUNTER — HOSPITAL ENCOUNTER (INPATIENT)
Facility: MEDICAL CENTER | Age: 33
LOS: 4 days | DRG: 481 | End: 2023-01-31
Attending: EMERGENCY MEDICINE | Admitting: HOSPITALIST
Payer: MEDICARE

## 2023-01-27 DIAGNOSIS — S72.002A HIP FRACTURE, LEFT, CLOSED, INITIAL ENCOUNTER (HCC): ICD-10-CM

## 2023-01-27 PROBLEM — J45.909 MODERATE ASTHMA: Status: ACTIVE | Noted: 2023-01-27

## 2023-01-27 PROCEDURE — 99223 1ST HOSP IP/OBS HIGH 75: CPT | Mod: AI | Performed by: HOSPITALIST

## 2023-01-27 PROCEDURE — 36415 COLL VENOUS BLD VENIPUNCTURE: CPT

## 2023-01-27 PROCEDURE — 700102 HCHG RX REV CODE 250 W/ 637 OVERRIDE(OP): Performed by: HOSPITALIST

## 2023-01-27 PROCEDURE — 700111 HCHG RX REV CODE 636 W/ 250 OVERRIDE (IP): Performed by: EMERGENCY MEDICINE

## 2023-01-27 PROCEDURE — 99285 EMERGENCY DEPT VISIT HI MDM: CPT

## 2023-01-27 PROCEDURE — 770001 HCHG ROOM/CARE - MED/SURG/GYN PRIV*

## 2023-01-27 PROCEDURE — 96374 THER/PROPH/DIAG INJ IV PUSH: CPT

## 2023-01-27 PROCEDURE — A9270 NON-COVERED ITEM OR SERVICE: HCPCS | Performed by: HOSPITALIST

## 2023-01-27 RX ORDER — POLYETHYLENE GLYCOL 3350 17 G/17G
1 POWDER, FOR SOLUTION ORAL
Status: DISCONTINUED | OUTPATIENT
Start: 2023-01-27 | End: 2023-01-31 | Stop reason: HOSPADM

## 2023-01-27 RX ORDER — IPRATROPIUM BROMIDE AND ALBUTEROL SULFATE 2.5; .5 MG/3ML; MG/3ML
3 SOLUTION RESPIRATORY (INHALATION) EVERY 6 HOURS PRN
Status: DISCONTINUED | OUTPATIENT
Start: 2023-01-27 | End: 2023-01-31 | Stop reason: HOSPADM

## 2023-01-27 RX ORDER — CHOLECALCIFEROL (VITAMIN D3) 125 MCG
10 CAPSULE ORAL
Status: DISCONTINUED | OUTPATIENT
Start: 2023-01-27 | End: 2023-01-31 | Stop reason: HOSPADM

## 2023-01-27 RX ORDER — PROCHLORPERAZINE EDISYLATE 5 MG/ML
5-10 INJECTION INTRAMUSCULAR; INTRAVENOUS EVERY 4 HOURS PRN
Status: DISCONTINUED | OUTPATIENT
Start: 2023-01-27 | End: 2023-01-31 | Stop reason: HOSPADM

## 2023-01-27 RX ORDER — ZIPRASIDONE HYDROCHLORIDE 40 MG/1
40 CAPSULE ORAL
Status: DISCONTINUED | OUTPATIENT
Start: 2023-01-27 | End: 2023-01-31 | Stop reason: HOSPADM

## 2023-01-27 RX ORDER — ONDANSETRON 2 MG/ML
4 INJECTION INTRAMUSCULAR; INTRAVENOUS EVERY 4 HOURS PRN
Status: DISCONTINUED | OUTPATIENT
Start: 2023-01-27 | End: 2023-01-31 | Stop reason: HOSPADM

## 2023-01-27 RX ORDER — AMOXICILLIN 250 MG
2 CAPSULE ORAL 2 TIMES DAILY
Status: DISCONTINUED | OUTPATIENT
Start: 2023-01-27 | End: 2023-01-31 | Stop reason: HOSPADM

## 2023-01-27 RX ORDER — MORPHINE SULFATE 4 MG/ML
4 INJECTION INTRAVENOUS ONCE
Status: COMPLETED | OUTPATIENT
Start: 2023-01-27 | End: 2023-01-27

## 2023-01-27 RX ORDER — ACETAMINOPHEN 325 MG/1
650 TABLET ORAL EVERY 6 HOURS PRN
Status: DISCONTINUED | OUTPATIENT
Start: 2023-01-27 | End: 2023-01-31 | Stop reason: HOSPADM

## 2023-01-27 RX ORDER — OXYCODONE HYDROCHLORIDE 5 MG/1
5 TABLET ORAL
Status: DISCONTINUED | OUTPATIENT
Start: 2023-01-27 | End: 2023-01-31 | Stop reason: HOSPADM

## 2023-01-27 RX ORDER — METOPROLOL SUCCINATE 25 MG/1
25 TABLET, EXTENDED RELEASE ORAL EVERY MORNING
Status: DISCONTINUED | OUTPATIENT
Start: 2023-01-28 | End: 2023-01-31 | Stop reason: HOSPADM

## 2023-01-27 RX ORDER — TRAZODONE HYDROCHLORIDE 50 MG/1
100 TABLET ORAL
Status: DISCONTINUED | OUTPATIENT
Start: 2023-01-27 | End: 2023-01-31 | Stop reason: HOSPADM

## 2023-01-27 RX ORDER — DIPHENHYDRAMINE HCL 25 MG
50 TABLET ORAL
Status: DISCONTINUED | OUTPATIENT
Start: 2023-01-27 | End: 2023-01-31 | Stop reason: HOSPADM

## 2023-01-27 RX ORDER — LABETALOL HYDROCHLORIDE 5 MG/ML
10 INJECTION, SOLUTION INTRAVENOUS EVERY 4 HOURS PRN
Status: DISCONTINUED | OUTPATIENT
Start: 2023-01-27 | End: 2023-01-31 | Stop reason: HOSPADM

## 2023-01-27 RX ORDER — BISACODYL 10 MG
10 SUPPOSITORY, RECTAL RECTAL
Status: DISCONTINUED | OUTPATIENT
Start: 2023-01-27 | End: 2023-01-31 | Stop reason: HOSPADM

## 2023-01-27 RX ORDER — PROMETHAZINE HYDROCHLORIDE 25 MG/1
12.5-25 TABLET ORAL EVERY 4 HOURS PRN
Status: DISCONTINUED | OUTPATIENT
Start: 2023-01-27 | End: 2023-01-31 | Stop reason: HOSPADM

## 2023-01-27 RX ORDER — PROMETHAZINE HYDROCHLORIDE 25 MG/1
12.5-25 SUPPOSITORY RECTAL EVERY 4 HOURS PRN
Status: DISCONTINUED | OUTPATIENT
Start: 2023-01-27 | End: 2023-01-31 | Stop reason: HOSPADM

## 2023-01-27 RX ORDER — IPRATROPIUM BROMIDE AND ALBUTEROL SULFATE 2.5; .5 MG/3ML; MG/3ML
3 SOLUTION RESPIRATORY (INHALATION) EVERY 6 HOURS PRN
Status: ON HOLD | COMMUNITY
End: 2023-02-09

## 2023-01-27 RX ORDER — MORPHINE SULFATE 4 MG/ML
2 INJECTION INTRAVENOUS
Status: DISCONTINUED | OUTPATIENT
Start: 2023-01-27 | End: 2023-01-31 | Stop reason: HOSPADM

## 2023-01-27 RX ORDER — ALBUTEROL SULFATE 90 UG/1
2 AEROSOL, METERED RESPIRATORY (INHALATION) EVERY 6 HOURS PRN
Status: DISCONTINUED | OUTPATIENT
Start: 2023-01-27 | End: 2023-01-31 | Stop reason: HOSPADM

## 2023-01-27 RX ORDER — OXYCODONE HYDROCHLORIDE 5 MG/1
2.5 TABLET ORAL
Status: DISCONTINUED | OUTPATIENT
Start: 2023-01-27 | End: 2023-01-31 | Stop reason: HOSPADM

## 2023-01-27 RX ORDER — ONDANSETRON 4 MG/1
4 TABLET, ORALLY DISINTEGRATING ORAL EVERY 4 HOURS PRN
Status: DISCONTINUED | OUTPATIENT
Start: 2023-01-27 | End: 2023-01-31 | Stop reason: HOSPADM

## 2023-01-27 RX ADMIN — Medication 10 MG: at 21:36

## 2023-01-27 RX ADMIN — OXYCODONE HYDROCHLORIDE 5 MG: 5 TABLET ORAL at 21:35

## 2023-01-27 RX ADMIN — IVABRADINE 7.5 MG: 7.5 TABLET, FILM COATED ORAL at 21:46

## 2023-01-27 RX ADMIN — TRAZODONE HYDROCHLORIDE 100 MG: 50 TABLET ORAL at 21:35

## 2023-01-27 RX ADMIN — ZIPRASIDONE HYDROCHLORIDE 40 MG: 40 CAPSULE ORAL at 21:35

## 2023-01-27 RX ADMIN — DIPHENHYDRAMINE HYDROCHLORIDE 50 MG: 25 TABLET ORAL at 21:35

## 2023-01-27 RX ADMIN — MORPHINE SULFATE 4 MG: 4 INJECTION, SOLUTION INTRAMUSCULAR; INTRAVENOUS at 18:45

## 2023-01-27 ASSESSMENT — ENCOUNTER SYMPTOMS
HEMOPTYSIS: 0
DIZZINESS: 0
EYE PAIN: 0
SINUS PAIN: 0
PHOTOPHOBIA: 0
FEVER: 0
NAUSEA: 0
BLOOD IN STOOL: 0
HALLUCINATIONS: 0
HEARTBURN: 0
BLURRED VISION: 0
SPUTUM PRODUCTION: 0
PND: 0
FLANK PAIN: 0
DIAPHORESIS: 0
VOMITING: 0
FALLS: 1
PALPITATIONS: 0
NECK PAIN: 0
COUGH: 0
CONSTIPATION: 0
WEAKNESS: 0
ABDOMINAL PAIN: 0
ORTHOPNEA: 0
CLAUDICATION: 0
BACK PAIN: 0
STRIDOR: 0
DIARRHEA: 0
DOUBLE VISION: 0
SHORTNESS OF BREATH: 0
CHILLS: 0
TREMORS: 0
TINGLING: 0
BRUISES/BLEEDS EASILY: 0
DEPRESSION: 0
SORE THROAT: 0
WHEEZING: 0
HEADACHES: 0
MYALGIAS: 0
POLYDIPSIA: 0

## 2023-01-27 ASSESSMENT — COPD QUESTIONNAIRES
DURING THE PAST 4 WEEKS HOW MUCH DID YOU FEEL SHORT OF BREATH: NONE/LITTLE OF THE TIME
HAVE YOU SMOKED AT LEAST 100 CIGARETTES IN YOUR ENTIRE LIFE: NO/DON'T KNOW
DO YOU EVER COUGH UP ANY MUCUS OR PHLEGM?: NO/ONLY WITH OCCASIONAL COLDS OR INFECTIONS
COPD SCREENING SCORE: 0

## 2023-01-27 ASSESSMENT — LIFESTYLE VARIABLES
EVER HAD A DRINK FIRST THING IN THE MORNING TO STEADY YOUR NERVES TO GET RID OF A HANGOVER: NO
EVER FELT BAD OR GUILTY ABOUT YOUR DRINKING: NO
TOTAL SCORE: 0
HAVE PEOPLE ANNOYED YOU BY CRITICIZING YOUR DRINKING: NO
ON A TYPICAL DAY WHEN YOU DRINK ALCOHOL HOW MANY DRINKS DO YOU HAVE: 0
HAVE YOU EVER FELT YOU SHOULD CUT DOWN ON YOUR DRINKING: NO
CONSUMPTION TOTAL: NEGATIVE
TOTAL SCORE: 0
HOW MANY TIMES IN THE PAST YEAR HAVE YOU HAD 5 OR MORE DRINKS IN A DAY: 0
AVERAGE NUMBER OF DAYS PER WEEK YOU HAVE A DRINK CONTAINING ALCOHOL: 0
TOTAL SCORE: 0
SUBSTANCE_ABUSE: 0
ALCOHOL_USE: NO

## 2023-01-27 ASSESSMENT — COGNITIVE AND FUNCTIONAL STATUS - GENERAL
STANDING UP FROM CHAIR USING ARMS: A LOT
MOVING FROM LYING ON BACK TO SITTING ON SIDE OF FLAT BED: A LOT
DRESSING REGULAR LOWER BODY CLOTHING: A LITTLE
WALKING IN HOSPITAL ROOM: A LOT
MOBILITY SCORE: 14
MOVING TO AND FROM BED TO CHAIR: A LITTLE
CLIMB 3 TO 5 STEPS WITH RAILING: A LOT
TURNING FROM BACK TO SIDE WHILE IN FLAT BAD: A LITTLE
DRESSING REGULAR UPPER BODY CLOTHING: A LITTLE
HELP NEEDED FOR BATHING: A LOT
TOILETING: A LOT
SUGGESTED CMS G CODE MODIFIER DAILY ACTIVITY: CK
DAILY ACTIVITIY SCORE: 18
SUGGESTED CMS G CODE MODIFIER MOBILITY: CL

## 2023-01-27 ASSESSMENT — FIBROSIS 4 INDEX: FIB4 SCORE: 0.55

## 2023-01-28 ENCOUNTER — ANESTHESIA EVENT (OUTPATIENT)
Dept: SURGERY | Facility: MEDICAL CENTER | Age: 33
DRG: 481 | End: 2023-01-28
Payer: MEDICARE

## 2023-01-28 ENCOUNTER — APPOINTMENT (OUTPATIENT)
Dept: RADIOLOGY | Facility: MEDICAL CENTER | Age: 33
DRG: 481 | End: 2023-01-28
Attending: ORTHOPAEDIC SURGERY
Payer: MEDICARE

## 2023-01-28 ENCOUNTER — ANESTHESIA (OUTPATIENT)
Dept: SURGERY | Facility: MEDICAL CENTER | Age: 33
DRG: 481 | End: 2023-01-28
Payer: MEDICARE

## 2023-01-28 PROBLEM — G90.A POSTURAL ORTHOSTATIC TACHYCARDIA SYNDROME: Status: ACTIVE | Noted: 2023-01-28

## 2023-01-28 LAB
ABO GROUP BLD: NORMAL
ALBUMIN SERPL BCP-MCNC: 3.9 G/DL (ref 3.2–4.9)
ALBUMIN/GLOB SERPL: 2.1 G/DL
ALP SERPL-CCNC: 67 U/L (ref 30–99)
ALT SERPL-CCNC: 59 U/L (ref 2–50)
ANION GAP SERPL CALC-SCNC: 12 MMOL/L (ref 7–16)
AST SERPL-CCNC: 54 U/L (ref 12–45)
BASOPHILS # BLD AUTO: 0.6 % (ref 0–1.8)
BASOPHILS # BLD: 0.03 K/UL (ref 0–0.12)
BILIRUB SERPL-MCNC: 0.3 MG/DL (ref 0.1–1.5)
BLD GP AB SCN SERPL QL: NORMAL
BUN SERPL-MCNC: 13 MG/DL (ref 8–22)
CALCIUM ALBUM COR SERPL-MCNC: 9.3 MG/DL (ref 8.5–10.5)
CALCIUM SERPL-MCNC: 9.2 MG/DL (ref 8.5–10.5)
CHLORIDE SERPL-SCNC: 108 MMOL/L (ref 96–112)
CO2 SERPL-SCNC: 19 MMOL/L (ref 20–33)
CREAT SERPL-MCNC: 0.61 MG/DL (ref 0.5–1.4)
EOSINOPHIL # BLD AUTO: 0.21 K/UL (ref 0–0.51)
EOSINOPHIL NFR BLD: 4.2 % (ref 0–6.9)
ERYTHROCYTE [DISTWIDTH] IN BLOOD BY AUTOMATED COUNT: 40.5 FL (ref 35.9–50)
GFR SERPLBLD CREATININE-BSD FMLA CKD-EPI: 121 ML/MIN/1.73 M 2
GLOBULIN SER CALC-MCNC: 1.9 G/DL (ref 1.9–3.5)
GLUCOSE BLD STRIP.AUTO-MCNC: 133 MG/DL (ref 65–99)
GLUCOSE BLD STRIP.AUTO-MCNC: 85 MG/DL (ref 65–99)
GLUCOSE BLD STRIP.AUTO-MCNC: 91 MG/DL (ref 65–99)
GLUCOSE SERPL-MCNC: 93 MG/DL (ref 65–99)
HCG UR QL: NEGATIVE
HCT VFR BLD AUTO: 37.9 % (ref 37–47)
HGB BLD-MCNC: 13 G/DL (ref 12–16)
IMM GRANULOCYTES # BLD AUTO: 0.02 K/UL (ref 0–0.11)
IMM GRANULOCYTES NFR BLD AUTO: 0.4 % (ref 0–0.9)
LYMPHOCYTES # BLD AUTO: 1.73 K/UL (ref 1–4.8)
LYMPHOCYTES NFR BLD: 34.9 % (ref 22–41)
MCH RBC QN AUTO: 30.9 PG (ref 27–33)
MCHC RBC AUTO-ENTMCNC: 34.3 G/DL (ref 33.6–35)
MCV RBC AUTO: 90 FL (ref 81.4–97.8)
MONOCYTES # BLD AUTO: 0.36 K/UL (ref 0–0.85)
MONOCYTES NFR BLD AUTO: 7.3 % (ref 0–13.4)
NEUTROPHILS # BLD AUTO: 2.6 K/UL (ref 2–7.15)
NEUTROPHILS NFR BLD: 52.6 % (ref 44–72)
NRBC # BLD AUTO: 0 K/UL
NRBC BLD-RTO: 0 /100 WBC
PLATELET # BLD AUTO: 146 K/UL (ref 164–446)
PMV BLD AUTO: 11.9 FL (ref 9–12.9)
POTASSIUM SERPL-SCNC: 3.7 MMOL/L (ref 3.6–5.5)
PROT SERPL-MCNC: 5.8 G/DL (ref 6–8.2)
RBC # BLD AUTO: 4.21 M/UL (ref 4.2–5.4)
RH BLD: NORMAL
SODIUM SERPL-SCNC: 139 MMOL/L (ref 135–145)
WBC # BLD AUTO: 5 K/UL (ref 4.8–10.8)

## 2023-01-28 PROCEDURE — 73502 X-RAY EXAM HIP UNI 2-3 VIEWS: CPT | Mod: LT

## 2023-01-28 PROCEDURE — 700111 HCHG RX REV CODE 636 W/ 250 OVERRIDE (IP): Performed by: ANESTHESIOLOGY

## 2023-01-28 PROCEDURE — 700102 HCHG RX REV CODE 250 W/ 637 OVERRIDE(OP): Performed by: HOSPITALIST

## 2023-01-28 PROCEDURE — 160041 HCHG SURGERY MINUTES - EA ADDL 1 MIN LEVEL 4: Performed by: ORTHOPAEDIC SURGERY

## 2023-01-28 PROCEDURE — 700101 HCHG RX REV CODE 250: Performed by: ANESTHESIOLOGY

## 2023-01-28 PROCEDURE — 700105 HCHG RX REV CODE 258: Performed by: INTERNAL MEDICINE

## 2023-01-28 PROCEDURE — 86900 BLOOD TYPING SEROLOGIC ABO: CPT

## 2023-01-28 PROCEDURE — 86850 RBC ANTIBODY SCREEN: CPT

## 2023-01-28 PROCEDURE — 160009 HCHG ANES TIME/MIN: Performed by: ORTHOPAEDIC SURGERY

## 2023-01-28 PROCEDURE — 27235 TREAT THIGH FRACTURE: CPT | Mod: LT | Performed by: ORTHOPAEDIC SURGERY

## 2023-01-28 PROCEDURE — 86901 BLOOD TYPING SEROLOGIC RH(D): CPT

## 2023-01-28 PROCEDURE — 36415 COLL VENOUS BLD VENIPUNCTURE: CPT

## 2023-01-28 PROCEDURE — 770001 HCHG ROOM/CARE - MED/SURG/GYN PRIV*

## 2023-01-28 PROCEDURE — C1713 ANCHOR/SCREW BN/BN,TIS/BN: HCPCS | Performed by: ORTHOPAEDIC SURGERY

## 2023-01-28 PROCEDURE — 160036 HCHG PACU - EA ADDL 30 MINS PHASE I: Performed by: ORTHOPAEDIC SURGERY

## 2023-01-28 PROCEDURE — 160002 HCHG RECOVERY MINUTES (STAT): Performed by: ORTHOPAEDIC SURGERY

## 2023-01-28 PROCEDURE — 160029 HCHG SURGERY MINUTES - 1ST 30 MINS LEVEL 4: Performed by: ORTHOPAEDIC SURGERY

## 2023-01-28 PROCEDURE — 85025 COMPLETE CBC W/AUTO DIFF WBC: CPT

## 2023-01-28 PROCEDURE — A9270 NON-COVERED ITEM OR SERVICE: HCPCS | Performed by: HOSPITALIST

## 2023-01-28 PROCEDURE — 160048 HCHG OR STATISTICAL LEVEL 1-5: Performed by: ORTHOPAEDIC SURGERY

## 2023-01-28 PROCEDURE — 80053 COMPREHEN METABOLIC PANEL: CPT

## 2023-01-28 PROCEDURE — 01230 ANES OPN UPPER 2/3 FEMUR NOS: CPT | Performed by: ANESTHESIOLOGY

## 2023-01-28 PROCEDURE — 99232 SBSQ HOSP IP/OBS MODERATE 35: CPT | Performed by: INTERNAL MEDICINE

## 2023-01-28 PROCEDURE — 0QH734Z INSERTION OF INTERNAL FIXATION DEVICE INTO LEFT UPPER FEMUR, PERCUTANEOUS APPROACH: ICD-10-PCS | Performed by: ORTHOPAEDIC SURGERY

## 2023-01-28 PROCEDURE — 700111 HCHG RX REV CODE 636 W/ 250 OVERRIDE (IP): Performed by: HOSPITALIST

## 2023-01-28 PROCEDURE — 81025 URINE PREGNANCY TEST: CPT

## 2023-01-28 PROCEDURE — 82962 GLUCOSE BLOOD TEST: CPT | Mod: 91

## 2023-01-28 PROCEDURE — 700105 HCHG RX REV CODE 258: Performed by: ANESTHESIOLOGY

## 2023-01-28 PROCEDURE — 94660 CPAP INITIATION&MGMT: CPT

## 2023-01-28 PROCEDURE — 160035 HCHG PACU - 1ST 60 MINS PHASE I: Performed by: ORTHOPAEDIC SURGERY

## 2023-01-28 DEVICE — SCREW CANNULATED FULLY THREADED 7.3MM X 85MM (3TX3=9): Type: IMPLANTABLE DEVICE | Site: HIP | Status: FUNCTIONAL

## 2023-01-28 DEVICE — SCREW CANNULATED FULLY THREADED 7.3MM X 95MM (3TX3=9): Type: IMPLANTABLE DEVICE | Site: HIP | Status: FUNCTIONAL

## 2023-01-28 DEVICE — SCREW CANNULATED 32MM THREAD 7.3MM X 85MM (3TX3=9): Type: IMPLANTABLE DEVICE | Site: HIP | Status: FUNCTIONAL

## 2023-01-28 RX ORDER — HYDROMORPHONE HYDROCHLORIDE 1 MG/ML
0.4 INJECTION, SOLUTION INTRAMUSCULAR; INTRAVENOUS; SUBCUTANEOUS
Status: DISCONTINUED | OUTPATIENT
Start: 2023-01-28 | End: 2023-01-28 | Stop reason: HOSPADM

## 2023-01-28 RX ORDER — DIPHENHYDRAMINE HYDROCHLORIDE 50 MG/ML
12.5 INJECTION INTRAMUSCULAR; INTRAVENOUS
Status: DISCONTINUED | OUTPATIENT
Start: 2023-01-28 | End: 2023-01-28 | Stop reason: HOSPADM

## 2023-01-28 RX ORDER — DEXTROSE MONOHYDRATE 25 G/50ML
25 INJECTION, SOLUTION INTRAVENOUS ONCE
Status: COMPLETED | OUTPATIENT
Start: 2023-01-28 | End: 2023-01-28

## 2023-01-28 RX ORDER — ONDANSETRON 2 MG/ML
4 INJECTION INTRAMUSCULAR; INTRAVENOUS
Status: DISCONTINUED | OUTPATIENT
Start: 2023-01-28 | End: 2023-01-28 | Stop reason: HOSPADM

## 2023-01-28 RX ORDER — DEXTROSE AND SODIUM CHLORIDE 5; .45 G/100ML; G/100ML
INJECTION, SOLUTION INTRAVENOUS CONTINUOUS
Status: DISCONTINUED | OUTPATIENT
Start: 2023-01-28 | End: 2023-01-29

## 2023-01-28 RX ORDER — HYDROMORPHONE HYDROCHLORIDE 1 MG/ML
0.2 INJECTION, SOLUTION INTRAMUSCULAR; INTRAVENOUS; SUBCUTANEOUS
Status: DISCONTINUED | OUTPATIENT
Start: 2023-01-28 | End: 2023-01-28 | Stop reason: HOSPADM

## 2023-01-28 RX ORDER — MIDAZOLAM HYDROCHLORIDE 1 MG/ML
1 INJECTION INTRAMUSCULAR; INTRAVENOUS
Status: DISCONTINUED | OUTPATIENT
Start: 2023-01-28 | End: 2023-01-28 | Stop reason: HOSPADM

## 2023-01-28 RX ORDER — DIPHENHYDRAMINE HCL 25 MG
25 TABLET ORAL ONCE
Status: DISPENSED | OUTPATIENT
Start: 2023-01-28 | End: 2023-01-29

## 2023-01-28 RX ORDER — HYDROMORPHONE HYDROCHLORIDE 1 MG/ML
0.1 INJECTION, SOLUTION INTRAMUSCULAR; INTRAVENOUS; SUBCUTANEOUS
Status: DISCONTINUED | OUTPATIENT
Start: 2023-01-28 | End: 2023-01-28 | Stop reason: HOSPADM

## 2023-01-28 RX ORDER — MIDAZOLAM HYDROCHLORIDE 1 MG/ML
INJECTION INTRAMUSCULAR; INTRAVENOUS PRN
Status: DISCONTINUED | OUTPATIENT
Start: 2023-01-28 | End: 2023-01-28 | Stop reason: SURG

## 2023-01-28 RX ORDER — METOPROLOL TARTRATE 1 MG/ML
1 INJECTION, SOLUTION INTRAVENOUS
Status: DISCONTINUED | OUTPATIENT
Start: 2023-01-28 | End: 2023-01-28 | Stop reason: HOSPADM

## 2023-01-28 RX ORDER — SODIUM CHLORIDE 9 MG/ML
INJECTION, SOLUTION INTRAVENOUS
Status: DISCONTINUED | OUTPATIENT
Start: 2023-01-28 | End: 2023-01-28 | Stop reason: SURG

## 2023-01-28 RX ORDER — CEFTRIAXONE 1 G/1
INJECTION, POWDER, FOR SOLUTION INTRAMUSCULAR; INTRAVENOUS PRN
Status: DISCONTINUED | OUTPATIENT
Start: 2023-01-28 | End: 2023-01-28 | Stop reason: SURG

## 2023-01-28 RX ORDER — LIDOCAINE HYDROCHLORIDE 20 MG/ML
INJECTION, SOLUTION EPIDURAL; INFILTRATION; INTRACAUDAL; PERINEURAL PRN
Status: DISCONTINUED | OUTPATIENT
Start: 2023-01-28 | End: 2023-01-28 | Stop reason: SURG

## 2023-01-28 RX ORDER — ROCURONIUM BROMIDE 10 MG/ML
INJECTION, SOLUTION INTRAVENOUS PRN
Status: DISCONTINUED | OUTPATIENT
Start: 2023-01-28 | End: 2023-01-28 | Stop reason: SURG

## 2023-01-28 RX ORDER — SODIUM CHLORIDE, SODIUM LACTATE, POTASSIUM CHLORIDE, CALCIUM CHLORIDE 600; 310; 30; 20 MG/100ML; MG/100ML; MG/100ML; MG/100ML
INJECTION, SOLUTION INTRAVENOUS
Status: DISCONTINUED | OUTPATIENT
Start: 2023-01-28 | End: 2023-01-28 | Stop reason: SURG

## 2023-01-28 RX ORDER — DIPHENHYDRAMINE HYDROCHLORIDE 50 MG/ML
INJECTION INTRAMUSCULAR; INTRAVENOUS PRN
Status: DISCONTINUED | OUTPATIENT
Start: 2023-01-28 | End: 2023-01-28 | Stop reason: SURG

## 2023-01-28 RX ORDER — SODIUM CHLORIDE, SODIUM LACTATE, POTASSIUM CHLORIDE, CALCIUM CHLORIDE 600; 310; 30; 20 MG/100ML; MG/100ML; MG/100ML; MG/100ML
INJECTION, SOLUTION INTRAVENOUS CONTINUOUS
Status: DISCONTINUED | OUTPATIENT
Start: 2023-01-28 | End: 2023-01-28 | Stop reason: HOSPADM

## 2023-01-28 RX ADMIN — EPHEDRINE SULFATE 5 MG: 50 INJECTION, SOLUTION INTRAVENOUS at 13:50

## 2023-01-28 RX ADMIN — DEXTROSE AND SODIUM CHLORIDE: 5; 450 INJECTION, SOLUTION INTRAVENOUS at 09:59

## 2023-01-28 RX ADMIN — OXYCODONE HYDROCHLORIDE 5 MG: 5 TABLET ORAL at 08:12

## 2023-01-28 RX ADMIN — SENNOSIDES AND DOCUSATE SODIUM 2 TABLET: 50; 8.6 TABLET ORAL at 05:06

## 2023-01-28 RX ADMIN — MIDAZOLAM HYDROCHLORIDE 1 MG: 1 INJECTION, SOLUTION INTRAMUSCULAR; INTRAVENOUS at 13:34

## 2023-01-28 RX ADMIN — OXYCODONE HYDROCHLORIDE 5 MG: 5 TABLET ORAL at 17:14

## 2023-01-28 RX ADMIN — ZIPRASIDONE HYDROCHLORIDE 40 MG: 40 CAPSULE ORAL at 20:18

## 2023-01-28 RX ADMIN — SENNOSIDES AND DOCUSATE SODIUM 2 TABLET: 50; 8.6 TABLET ORAL at 17:14

## 2023-01-28 RX ADMIN — IVABRADINE 7.5 MG: 7.5 TABLET, FILM COATED ORAL at 20:18

## 2023-01-28 RX ADMIN — HYDROMORPHONE HYDROCHLORIDE 0.4 MG: 1 INJECTION, SOLUTION INTRAMUSCULAR; INTRAVENOUS; SUBCUTANEOUS at 15:15

## 2023-01-28 RX ADMIN — FENTANYL CITRATE 50 MCG: 50 INJECTION, SOLUTION INTRAMUSCULAR; INTRAVENOUS at 14:43

## 2023-01-28 RX ADMIN — OXYCODONE HYDROCHLORIDE 5 MG: 5 TABLET ORAL at 20:19

## 2023-01-28 RX ADMIN — Medication 10 MG: at 20:19

## 2023-01-28 RX ADMIN — MORPHINE SULFATE 2 MG: 4 INJECTION, SOLUTION INTRAMUSCULAR; INTRAVENOUS at 23:44

## 2023-01-28 RX ADMIN — PROPOFOL 200 MG: 10 INJECTION, EMULSION INTRAVENOUS at 13:39

## 2023-01-28 RX ADMIN — DEXTROSE MONOHYDRATE 25 ML: 25 INJECTION, SOLUTION INTRAVENOUS at 12:51

## 2023-01-28 RX ADMIN — FENTANYL CITRATE 50 MCG: 50 INJECTION, SOLUTION INTRAMUSCULAR; INTRAVENOUS at 13:59

## 2023-01-28 RX ADMIN — LIDOCAINE HYDROCHLORIDE 80 MG: 20 INJECTION, SOLUTION EPIDURAL; INFILTRATION; INTRACAUDAL at 13:39

## 2023-01-28 RX ADMIN — EPHEDRINE SULFATE 5 MG: 50 INJECTION, SOLUTION INTRAVENOUS at 14:00

## 2023-01-28 RX ADMIN — SODIUM CHLORIDE, POTASSIUM CHLORIDE, SODIUM LACTATE AND CALCIUM CHLORIDE: 600; 310; 30; 20 INJECTION, SOLUTION INTRAVENOUS at 13:34

## 2023-01-28 RX ADMIN — SODIUM CHLORIDE: 9 INJECTION, SOLUTION INTRAVENOUS at 13:48

## 2023-01-28 RX ADMIN — MORPHINE SULFATE 2 MG: 4 INJECTION, SOLUTION INTRAMUSCULAR; INTRAVENOUS at 00:10

## 2023-01-28 RX ADMIN — SUGAMMADEX 200 MG: 100 INJECTION, SOLUTION INTRAVENOUS at 14:25

## 2023-01-28 RX ADMIN — CEFTRIAXONE SODIUM 1 G: 1 INJECTION, POWDER, FOR SOLUTION INTRAMUSCULAR; INTRAVENOUS at 13:54

## 2023-01-28 RX ADMIN — HYDROMORPHONE HYDROCHLORIDE 0.2 MG: 1 INJECTION, SOLUTION INTRAMUSCULAR; INTRAVENOUS; SUBCUTANEOUS at 15:00

## 2023-01-28 RX ADMIN — HYDROMORPHONE HYDROCHLORIDE 0.4 MG: 1 INJECTION, SOLUTION INTRAMUSCULAR; INTRAVENOUS; SUBCUTANEOUS at 15:07

## 2023-01-28 RX ADMIN — MORPHINE SULFATE 2 MG: 4 INJECTION, SOLUTION INTRAMUSCULAR; INTRAVENOUS at 05:05

## 2023-01-28 RX ADMIN — DIPHENHYDRAMINE HYDROCHLORIDE 25 MG: 50 INJECTION, SOLUTION INTRAMUSCULAR; INTRAVENOUS at 13:36

## 2023-01-28 RX ADMIN — ONDANSETRON HYDROCHLORIDE 4 MG: 2 SOLUTION INTRAMUSCULAR; INTRAVENOUS at 05:06

## 2023-01-28 RX ADMIN — HYDROMORPHONE HYDROCHLORIDE 0.4 MG: 1 INJECTION, SOLUTION INTRAMUSCULAR; INTRAVENOUS; SUBCUTANEOUS at 14:55

## 2023-01-28 RX ADMIN — OXYCODONE HYDROCHLORIDE 5 MG: 5 TABLET ORAL at 03:31

## 2023-01-28 RX ADMIN — POLYETHYLENE GLYCOL 3350 1 PACKET: 17 POWDER, FOR SOLUTION ORAL at 17:14

## 2023-01-28 RX ADMIN — TRAZODONE HYDROCHLORIDE 100 MG: 50 TABLET ORAL at 20:19

## 2023-01-28 RX ADMIN — DIPHENHYDRAMINE HYDROCHLORIDE 12.5 MG: 50 INJECTION INTRAMUSCULAR; INTRAVENOUS at 16:02

## 2023-01-28 RX ADMIN — MORPHINE SULFATE 2 MG: 4 INJECTION, SOLUTION INTRAMUSCULAR; INTRAVENOUS at 09:48

## 2023-01-28 RX ADMIN — DIPHENHYDRAMINE HYDROCHLORIDE 50 MG: 25 TABLET ORAL at 20:21

## 2023-01-28 RX ADMIN — FENTANYL CITRATE 50 MCG: 50 INJECTION, SOLUTION INTRAMUSCULAR; INTRAVENOUS at 13:34

## 2023-01-28 RX ADMIN — HYDROMORPHONE HYDROCHLORIDE 0.2 MG: 1 INJECTION, SOLUTION INTRAMUSCULAR; INTRAVENOUS; SUBCUTANEOUS at 15:28

## 2023-01-28 RX ADMIN — MORPHINE SULFATE 2 MG: 4 INJECTION, SOLUTION INTRAMUSCULAR; INTRAVENOUS at 18:06

## 2023-01-28 RX ADMIN — ROCURONIUM BROMIDE 45 MG: 10 INJECTION, SOLUTION INTRAVENOUS at 13:39

## 2023-01-28 ASSESSMENT — PAIN DESCRIPTION - PAIN TYPE
TYPE: SURGICAL PAIN
TYPE: ACUTE PAIN
TYPE: SURGICAL PAIN;ACUTE PAIN
TYPE: SURGICAL PAIN
TYPE: SURGICAL PAIN
TYPE: ACUTE PAIN
TYPE: SURGICAL PAIN
TYPE: SURGICAL PAIN

## 2023-01-28 ASSESSMENT — LIFESTYLE VARIABLES: SUBSTANCE_ABUSE: 0

## 2023-01-28 ASSESSMENT — ENCOUNTER SYMPTOMS
COUGH: 0
HEADACHES: 0
DIARRHEA: 0
FALLS: 1
SEIZURES: 0
BLURRED VISION: 0
NAUSEA: 0
ABDOMINAL PAIN: 0
HALLUCINATIONS: 0
VOMITING: 0
CHILLS: 0
DOUBLE VISION: 0
FEVER: 0
SHORTNESS OF BREATH: 0
SORE THROAT: 0
PALPITATIONS: 0

## 2023-01-28 ASSESSMENT — PAIN SCALES - GENERAL: PAIN_LEVEL: 3

## 2023-01-28 NOTE — H&P
Hospital Medicine History & Physical Note    Date of Service  1/27/2023    Primary Care Physician  Torres Brody M.D.    Consultants  orthopedics    Specialist Names:     Code Status  Full Code    Chief Complaint  Chief Complaint   Patient presents with    Hip Injury     L hip pain w/ L hip Fx, GLF on 1/24 onto L Hip. Pt states she was sent in by [facility] for further evaluation.        History of Presenting Illness  Kristin Balderrama is a 32 y.o. female who presented 1/27/2023 with past medical history of asthma, benign syndrome, Hashimoto's, sleep apnea, type 2 diabetes who presents to the hospital for left-sided hip pain.  Patient states that she fell 3 days ago at school and landed on her hip.  She does have a history of POTS syndrome and states that she feels lightheaded and falls frequently due to it.  Since then she has extreme pain and trouble bearing weight on the left leg.  She was evaluated at [facility] on 1/25 where she had negative x-rays.  Patient did have a CT scan that did not show any evidence of fracture.  She was then again seen by orthopedics who saw a fracture on her CT and recommended for her to come to the ER at [facility] for surgery.    I discussed the plan of care with patient.    Review of Systems  Review of Systems   Constitutional:  Negative for chills, diaphoresis, fever and malaise/fatigue.   HENT:  Negative for congestion, ear discharge, ear pain, hearing loss, nosebleeds, sinus pain, sore throat and tinnitus.    Eyes:  Negative for blurred vision, double vision, photophobia and pain.   Respiratory:  Negative for cough, hemoptysis, sputum production, shortness of breath, wheezing and stridor.    Cardiovascular:  Negative for chest pain, palpitations, orthopnea, claudication, leg swelling and PND.   Gastrointestinal:  Negative for abdominal pain, blood in stool, constipation, diarrhea, heartburn, melena, nausea and vomiting.   Genitourinary:  Negative for dysuria, flank pain,  frequency, hematuria and urgency.   Musculoskeletal:  Positive for falls and joint pain. Negative for back pain, myalgias and neck pain.   Skin:  Negative for itching and rash.   Neurological:  Negative for dizziness, tingling, tremors, weakness and headaches.   Endo/Heme/Allergies:  Negative for environmental allergies and polydipsia. Does not bruise/bleed easily.   Psychiatric/Behavioral:  Negative for depression, hallucinations, substance abuse and suicidal ideas.      Past Medical History   has a past medical history of Abdominal pain, Anginal syndrome, Apnea, sleep, Arrhythmia, Arthritis, ASTHMA, Back pain, Borderline personality disorder (HCC), Breath shortness, Bronchitis (02/08/2022), Cardiac arrhythmia, Chickenpox, Chronic UTI (09/18/2010), Cough, Daytime sleepiness, Dental disorder (03/08/2021), Depression, Diabetes ([facility]), Diarrhea, Disorder of thyroid, Fall, Fatigue, Frequent headaches, Gasping for breath, Gynecological disorder, Headache(784.0), Heart burn, Heart murmur, History of falling, Indigestion, Migraine, Mitochondrial disease ([facility]), 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    Surgical History   has a past surgical history that includes neuro dest facet l/s w/ig sngl (4/21/2015); recovery (1/27/2016); delmar by laparoscopy (8/29/2010); lumbar fusion anterior (8/21/2012); other cardiac surgery (1/2016); tonsillectomy; bowel stimulator insertion (7/13/2016); gastroscopy with balloon dilatation (N/A, 1/4/2017); muscle biopsy (Right, 1/26/2017); other abdominal surgery; laminotomy; bowel stimulator insertion (3/10/2021);  pr lap,diagnostic abdomen (2/14/2022); and ovarian cystectomy (Right, 2/14/2022).     Family History  family history includes Genitourinary () Problems in her sister; Heart Disease in her maternal grandmother and mother; Hypertension in her maternal grandmother, maternal uncle, and mother; No Known Problems in her sister; Other in her mother and sister; Sleep Apnea in her mother.   Family history reviewed with patient. There is no family history that is pertinent to the chief complaint.     Social History   reports that she has never smoked. She has never used smokeless tobacco. She reports that she does not currently use drugs after having used the following drugs: Marijuana. Frequency: 7.00 times per week. She reports that she does not drink alcohol.    Allergies  Allergies   Allergen Reactions    Cefdinir Shortness of Breath and Itching     Tolerated 1/18/17  Tolerates ceftriaxone  Tolerated augmentin 8/2019     Depakote [Divalproex Sodium] Unspecified     Muscle spasms/muscle pain and weakness      Doxycycline Anaphylaxis and Vomiting     Other reaction(s): pustules/blisters  Other reaction(s): stomach pain    Montelukast [Singulair] Unspecified     Cardiac effusion    Vancomycin Itching     Pt becomes flushed in face and chest.   RXN=7/10/16    Amitriptyline Unspecified     Headaches      Aripiprazole [Abilify] Unspecified     Headaches/muscle twitching      Clindamycin Nausea         Other reaction(s): nausea stomach pain    Flomax [Tamsulosin Hydrochloride] Swelling    Levaquin Unspecified     Severe muscle cramps in legs  RXN=unknown  Other reaction(s): leg muscle cramps    Metformin Unspecified     Increased lactic acid     Other reaction(s): itching and rash/nausea vomiting    Tamsulosin Swelling     Swelling of legs    Tape Rash     Tears skin off  coban with Tegaderm tape ok intermittently  RXN=ongoing    Wound Dressing Adhesive Hives     By pt report    Amoxicillin Rash    Depakote [Divalproex Sodium]  "    Erythromycin Rash     .  Other reaction(s): nausea stomach pain    Hydromorphone      Other reaction(s): vomiting    Ampicillin Rash     Pt reports that she received a rash     Ciprofloxacin Rash          Keflex Rash     Pt states she gets a rash with this medication  Tolerates ceftriaxone  Can take with Benadryl    Levofloxacin Unspecified     Leg muscle cramps    Metronidazole Rash     \"Vision problems\"  Other reaction(s): vision problems    Penicillins Hives     Can take with Benadryl    Sulfa Drugs Itching and Myalgia     Muscle pain and weakness  Other reaction(s): unknown    Valproic Acid Rash     .       Medications  Prior to Admission Medications   Prescriptions Last Dose Informant Patient Reported? Taking?   HYDROcodone-acetaminophen (NORCO) 5-325 MG Tab per tablet 1/27/2023 at 0900  Yes No   Sig: Take 1 Tablet by mouth every 6 hours as needed.   Melatonin 10 MG Tab 1/26/2023 at 2100  Yes No   Sig: Take 10 mg by mouth at bedtime.   SUMAtriptan (IMITREX) 50 MG Tab 30d ago at unk  Yes No   Sig: Take 50 mg by mouth one time as needed.   acetaminophen/caffeine/butalbital 300-40-50 mg (FIORICET) -40 MG Cap capsule unk at unk  Yes No   Sig: Take 1 Capsule by mouth every 6 hours as needed. Indications: Tension Headache   albuterol 108 (90 Base) MCG/ACT Aero Soln inhalation aerosol unk at unk Patient No No   Sig: Inhale 2 Puffs every 6 hours as needed for Shortness of Breath.   diphenhydrAMINE (BENADRYL) 25 MG capsule 1/26/2023 at 2100  Yes No   Sig: Take 50 mg by mouth at bedtime.   docusate sodium (COLACE) 250 MG capsule 1/27/2023 at 0900 Patient Yes No   Sig: Take 250 mg by mouth 2 times a day.   etonogestrel (NEXPLANON) 68 MG Implant implant unk at unk Patient Yes No   Sig: Inject 1 Each under the skin see administration instructions. Pt reports she is not sure when she had this medications injected last, pt reports she still has it in her arm  Every 3 years to change   fluconazole (DIFLUCAN) 150 MG " tablet 1/25/2023 at unk  Yes No   Sig: Take 150 mg by mouth every Wednesday.   ipratropium-albuterol (DUONEB) 0.5-2.5 (3) MG/3ML nebulizer solution unk at unk  Yes Yes   Sig: Take 3 mL by nebulization every 6 hours as needed for Shortness of Breath.   ivabradine (CORLANOR) 7.5 MG Tab tablet 1/27/2023 at 0900 Patient No No   Sig: Take 1 Tablet by mouth 2 times a day with meals.   lactulose 20 GM/30ML Solution 1/26/2023 at 2100  Yes No   Sig: Take 40 mg by mouth 2 times a day.   metoprolol SR (TOPROL XL) 25 MG TABLET SR 24 HR 1/27/2023 at 0900 Patient Yes No   Sig: Take 25 mg by mouth every morning.   polyethylene glycol 3350 (MIRALAX) 17 GM/SCOOP Powder unk at unk  Yes No   Sig: Take 17 g by mouth 1 time a day as needed.   traZODone (DESYREL) 100 MG Tab 1/26/2023 at 2100 Patient Yes No   Sig: Take 100 mg by mouth at bedtime.   ziprasidone (GEODON) 40 MG Cap 1/26/2023 at 2100 Patient Yes No   Sig: Take 1 Capsule by mouth at bedtime.      Facility-Administered Medications: None       Physical Exam  Temp:  [36.7 °C (98.1 °F)] 36.7 °C (98.1 °F)  Pulse:  [71-76] 71  Resp:  [18] 18  BP: (161-168)/() 161/74  SpO2:  [98 %] 98 %  Blood Pressure: (!) 161/74   Temperature: 36.7 °C (98.1 °F)   Pulse: 71   Respiration: 18   Pulse Oximetry: 98 %       Physical Exam  Vitals and nursing note reviewed.   Constitutional:       General: She is not in acute distress.     Appearance: Normal appearance. She is not ill-appearing, toxic-appearing or diaphoretic.   HENT:      Head: Normocephalic and atraumatic.      Nose: No congestion or rhinorrhea.      Mouth/Throat:      Pharynx: No oropharyngeal exudate or posterior oropharyngeal erythema.   Eyes:      General: No scleral icterus.  Neck:      Vascular: No carotid bruit or JVD.   Cardiovascular:      Rate and Rhythm: Normal rate and regular rhythm.      Pulses: Normal pulses.      Heart sounds: Normal heart sounds. No murmur heard.    No friction rub. No gallop.   Pulmonary:       Effort: Pulmonary effort is normal. No respiratory distress.      Breath sounds: No stridor. No wheezing, rhonchi or rales.   Abdominal:      General: Abdomen is flat. There is no distension.      Palpations: There is no mass.      Tenderness: There is no abdominal tenderness. There is no left CVA tenderness, guarding or rebound.      Hernia: No hernia is present.   Musculoskeletal:         General: Tenderness present. No swelling. Normal range of motion.      Cervical back: No rigidity. No muscular tenderness.      Right lower leg: No edema.      Left lower leg: No edema.   Lymphadenopathy:      Cervical: No cervical adenopathy.   Skin:     General: Skin is warm and dry.      Capillary Refill: Capillary refill takes more than 3 seconds.      Coloration: Skin is not jaundiced or pale.      Findings: No bruising or erythema.   Neurological:      Mental Status: She is alert.       Laboratory:          No results for input(s): ALTSGPT, ASTSGOT, ALKPHOSPHAT, TBILIRUBIN, DBILIRUBIN, GAMMAGT, AMYLASE, LIPASE, ALB, PREALBUMIN, GLUCOSE in the last 72 hours.      No results for input(s): NTPROBNP in the last 72 hours.      No results for input(s): TROPONINT in the last 72 hours.    Imaging:  No orders to display       X-Ray:  I have personally reviewed the images and compared with prior images.    Assessment/Plan:  Justification for Admission Status  I anticipate this patient will require at least two midnights for appropriate medical management, necessitating inpatient admission because left-sided hip fracture    Patient will need a Med/Surg bed on ORTHOPEDICS service .  The need is secondary to left hip fracture.    * Hip fracture, left, closed, initial encounter (HCC)- (present on admission)  Assessment & Plan  Orthopedics saw fractures left-sided CT scan  Pain control with oral and IV narcotics  DVT prophylaxis after surgery  High risk patient secondary to a ethlers Danlos syndrome  N.p.o. for surgery  PTOT  eval    Moderate asthma  Assessment & Plan  Continue home inhalers  Not in exacerbation    Schizophrenia (HCC)- (present on admission)  Assessment & Plan  Continue home Geodon        VTE prophylaxis: SCDs/TEDs

## 2023-01-28 NOTE — PROGRESS NOTES
[facility] Medicine Daily Progress Note    Date of Service  1/28/2023    Chief Complaint  Kristin Balderrama is a 32 y.o. female admitted 1/27/2023 with fall    Hospital Course  No notes on file    Interval Problem Update  Patient was seen and examined at bedside.  No acute events overnight. Patient is resting comfortably in bed and in no acute distress.     OR today    I have discussed this patient's plan of care and discharge plan at IDT rounds today with Case Management, Nursing, Nursing leadership, and other members of the IDT team.    Consultants/Specialty  orthopedics    Code Status  Full Code    Disposition  Patient is not medically cleared for discharge.   Anticipate discharge to to home with organized home healthcare and close outpatient follow-up.  I have placed the appropriate orders for post-discharge needs.    Review of Systems  Review of Systems   Constitutional:  Negative for chills and fever.   HENT:  Negative for congestion and sore throat.    Eyes:  Negative for blurred vision and double vision.   Respiratory:  Negative for cough and shortness of breath.    Cardiovascular:  Negative for chest pain and palpitations.   Gastrointestinal:  Negative for abdominal pain, diarrhea, nausea and vomiting.   Genitourinary:  Negative for dysuria and frequency.   Musculoskeletal:  Positive for falls and joint pain.   Skin:  Negative for rash.   Neurological:  Negative for seizures and headaches.   Psychiatric/Behavioral:  Negative for hallucinations and substance abuse.       Physical Exam  Temp:  [36.6 °C (97.9 °F)-36.9 °C (98.4 °F)] 36.6 °C (97.9 °F)  Pulse:  [59-76] 65  Resp:  [17-18] 18  BP: (101-168)/() 107/58  SpO2:  [89 %-98 %] 94 %    Physical Exam  Vitals and nursing note reviewed.   Constitutional:       General: She is not in acute distress.     Appearance: She is obese. She is not toxic-appearing.      Comments: Pleasant, conversational   HENT:      Head: Normocephalic.      Right Ear: External  ear normal.      Left Ear: External ear normal.      Nose: No congestion.      Mouth/Throat:      Mouth: Mucous membranes are moist.      Pharynx: No oropharyngeal exudate.   Eyes:      General: No scleral icterus.     Pupils: Pupils are equal, round, and reactive to light.   Cardiovascular:      Rate and Rhythm: Normal rate and regular rhythm.      Heart sounds: No murmur heard.  Pulmonary:      Breath sounds: No wheezing.   Abdominal:      Palpations: Abdomen is soft.      Tenderness: There is no abdominal tenderness. There is no guarding or rebound.   Musculoskeletal:         General: No swelling or deformity.      Comments: Left hip flexion limited by pain     Skin:     Coloration: Skin is not jaundiced.      Findings: No bruising.   Neurological:      General: No focal deficit present.      Mental Status: She is alert and oriented to person, place, and time. Mental status is at baseline.   Psychiatric:         Mood and Affect: Mood normal.         Behavior: Behavior normal.       Fluids  No intake or output data in the 24 hours ending 01/28/23 1026    Laboratory  Recent Labs     01/28/23  0442   WBC 5.0   RBC 4.21   HEMOGLOBIN 13.0   HEMATOCRIT 37.9   MCV 90.0   MCH 30.9   MCHC 34.3   RDW 40.5   PLATELETCT 146*   MPV 11.9     Recent Labs     01/28/23  0442   SODIUM 139   POTASSIUM 3.7   CHLORIDE 108   CO2 19*   GLUCOSE 93   BUN 13   CREATININE 0.61   CALCIUM 9.2                   Imaging  DX-HIP-UNILATERAL-W/O PELVIS-2/3 VIEWS LEFT    (Results Pending)   DX-PORTABLE FLUORO > 1 HOUR    (Results Pending)        Assessment/Plan  * Hip fracture, left, closed, initial encounter (HCC)- (present on admission)  Assessment & Plan  Orthopedics saw fractures left-sided CT scan  Pain control with oral and IV narcotics  DVT prophylaxis after surgery  High risk patient secondary to a ethlers Danlos syndrome  OR today    Postural orthostatic tachycardia syndrome  Assessment & Plan  Precipitating cause of patients fall  Follow  with cardiology  Continue Corlanor    Schizophrenia (HCC)- (present on admission)  Assessment & Plan  Continue home Geodon    TONYA (obstructive sleep apnea)- (present on admission)  Assessment & Plan  Nocturnal CPAP     Moderate asthma  Assessment & Plan  Continue home inhalers  Not in exacerbation    Morbidly obese (HCC)- (present on admission)  Assessment & Plan  Due to excess caloric intake  BMI 40         VTE prophylaxis: SCDs/TEDs    I have performed a physical exam and reviewed and updated ROS and Plan today (1/28/2023). In review of yesterday's note (1/27/2023), there are no changes except as documented above.

## 2023-01-28 NOTE — ED PROVIDER NOTES
ED Provider Note    CHIEF COMPLAINT  Chief Complaint   Patient presents with    Hip Injury     L hip pain w/ L hip Fx, GLF on 1/24 onto L Hip. Pt states she was sent in by [facility] for further evaluation.        EXTERNAL RECORDS REVIEWED  Patient's emergency department visit from [place] and patient's visit with orthopedics clinic today    HPI/ROS  LIMITATION TO HISTORY   Select: : None      Kristin Balderrama is a 32 y.o. female who presents with ongoing hip pain after a fall  2 days ago.  Patient was initially seen at [facility] where x-rays and CT were read as negative however patient was seen today by orthopedics who believes patient CT and exam is consistent with nondisplaced hip fracture.  Patient was sent to our facility to facilitate admission for percutaneous screw fixation.  Patient denies any weakness or numbness.  Reports ongoing pain. Pt has a complicated medical history including, hx of tranverse myelitis, Ether's Danlos, Sanz syndrome and bipolar disorder.       PAST MEDICAL HISTORY   has a past medical history of Abdominal pain, Anginal syndrome, Apnea, sleep, Arrhythmia, Arthritis, ASTHMA, Back pain, Borderline personality disorder (HCC), Breath shortness, Bronchitis (02/08/2022), Cardiac arrhythmia, Chickenpox, Chronic UTI (09/18/2010), Cough, Daytime sleepiness, Dental disorder (03/08/2021), Depression, Diabetes ([facility]), Diarrhea, Disorder of thyroid, Fall, Fatigue, Frequent headaches, Gasping for breath, Gynecological disorder, Headache(784.0), Heart burn, Heart murmur, History of falling, Indigestion, Migraine, Mitochondrial disease ([facility]), 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    SURGICAL HISTORY   has a past surgical history that includes neuro dest facet l/s w/ig sngl (4/21/2015); recovery (1/27/2016); delmar by laparoscopy (8/29/2010); lumbar fusion anterior (8/21/2012); other cardiac surgery (1/2016); tonsillectomy; bowel stimulator insertion (7/13/2016); gastroscopy with balloon dilatation (N/A, 1/4/2017); muscle biopsy (Right, 1/26/2017); other abdominal surgery; laminotomy; bowel stimulator insertion (3/10/2021); lap,diagnostic abdomen (2/14/2022); and ovarian cystectomy (Right, 2/14/2022).    FAMILY HISTORY  Family History   Problem Relation Age of Onset    Hypertension Mother     Sleep Apnea Mother     Heart Disease Mother     Other Mother         hypothryod    Hypertension Maternal Uncle     Heart Disease Maternal Grandmother     Hypertension Maternal Grandmother     No Known Problems Sister     Other Sister         Narcolepsy;fibromyalsia;bone;nerve    Genitourinary () Problems Sister         endometriosis       SOCIAL HISTORY  Social History     Tobacco Use    Smoking status: Never    Smokeless tobacco: Never   Vaping Use    Vaping Use: Never used   Substance and Sexual Activity    Alcohol use: No     Alcohol/week: 0.0 oz    Drug use: Not Currently     Frequency: 7.0 times per week     Types: Marijuana    Sexual activity: Not Currently     Birth control/protection: Implant       CURRENT MEDICATIONS  Home Medications    **Home medications have not yet been reviewed for this encounter**         ALLERGIES  Allergies   Allergen Reactions    Cefdinir Shortness of Breath and Itching     Tolerated 1/18/17  Tolerates ceftriaxone  Tolerated augmentin 8/2019     Depakote [Divalproex Sodium] Unspecified     Muscle spasms/muscle pain and weakness      Doxycycline Anaphylaxis and Vomiting     Other reaction(s): pustules/blisters  Other reaction(s): stomach pain    Montelukast [Singulair] Unspecified     Cardiac  "effusion    Vancomycin Itching     Pt becomes flushed in face and chest.   RXN=7/10/16    Amitriptyline Unspecified     Headaches      Aripiprazole [Abilify] Unspecified     Headaches/muscle twitching      Clindamycin Nausea         Other reaction(s): nausea stomach pain    Flomax [Tamsulosin Hydrochloride] Swelling    Levaquin Unspecified     Severe muscle cramps in legs  RXN=unknown  Other reaction(s): leg muscle cramps    Metformin Unspecified     Increased lactic acid     Other reaction(s): itching and rash/nausea vomiting    Tamsulosin Swelling     Swelling of legs    Tape Rash     Tears skin off  coban with Tegaderm tape ok intermittently  RXN=ongoing    Wound Dressing Adhesive Hives     By pt report    Amoxicillin Rash    Depakote [Divalproex Sodium]     Erythromycin Rash     .  Other reaction(s): nausea stomach pain    Hydromorphone      Other reaction(s): vomiting    Ampicillin Rash     Pt reports that she received a rash     Ciprofloxacin Rash          Keflex Rash     Pt states she gets a rash with this medication  Tolerates ceftriaxone  Can take with Benadryl    Levofloxacin Unspecified     Leg muscle cramps    Metronidazole Rash     \"Vision problems\"  Other reaction(s): vision problems    Penicillins Hives     Can take with Benadryl    Sulfa Drugs Itching and Myalgia     Muscle pain and weakness  Other reaction(s): unknown    Valproic Acid Rash     .       PHYSICAL EXAM  VITAL SIGNS: BP (!) 161/74   Pulse 71   Temp 36.7 °C (98.1 °F) (Temporal)   Resp 18   Wt 106 kg (234 lb)   SpO2 98%   BMI 40.17 kg/m²    Physical Exam  Constitutional:       Appearance: Normal appearance.   HENT:      Head: Normocephalic.   Cardiovascular:      Rate and Rhythm: Normal rate and regular rhythm.   Pulmonary:      Effort: Pulmonary effort is normal.      Breath sounds: Normal breath sounds.   Abdominal:      General: Abdomen is flat.   Musculoskeletal:      Comments: Left lower extremity with pain on logroll or range " of motion of left hip.  Distal pulses 2+, compartments soft, sensation intact throughout   Skin:     Capillary Refill: Capillary refill takes less than 2 seconds.   Neurological:      General: No focal deficit present.      Mental Status: She is alert.         DIAGNOSTIC STUDIES / PROCEDURES      COURSE & MEDICAL DECISION MAKING        INITIAL ASSESSMENT, COURSE AND PLAN  Care Narrative:     Patient with CT with questionable fracture, patient is unable to receive MRI as her stimulator for her bowel bladder is not compatible for this, this was placed secondary to complications of her either stenosis syndrome per patient report.   I discussed the case with orthopedics    We will discussed the case with hospitalist to admit         FINAL DIAGNOSIS  Left Hip pain    Electronically signed by: Torres Can M.D., 1/27/2023 5:28 PM

## 2023-01-28 NOTE — CARE PLAN
Problem: Knowledge Deficit - Standard  Goal: Patient and family/care givers will demonstrate understanding of plan of care, disease process/condition, diagnostic tests and medications  Outcome: Progressing     Problem: Pain - Standard  Goal: Alleviation of pain or a reduction in pain to the patient’s comfort goal  Outcome: Progressing     Problem: Fall Risk  Goal: Patient will remain free from falls  Outcome: Progressing     Problem: Psychosocial  Goal: Patient's level of anxiety will decrease  Outcome: Progressing  Flowsheets (Taken 1/27/2023 2242)  Decrease Anxiety Level: Collaborated with patient to identify and develop coping strategies     Problem: Communication  Goal: The ability to communicate needs accurately and effectively will improve  Outcome: Progressing  Flowsheets (Taken 1/27/2023 2242)  Communication: Assessed patient's ability to understand and communicate     Problem: Discharge Barriers/Planning  Goal: Patient's continuum of care needs are met  Outcome: Progressing  Flowsheets (Taken 1/27/2023 2242)  Continuum of Care Needs:   Assessed for discharge barriers   Communicated discharge barriers to interdisciplinary tream     Problem: Mobility  Goal: Patient's capacity to carry out activities will improve  Outcome: Progressing  Flowsheets (Taken 1/27/2023 2242)  Mobility:   Monitored for signs of activity intolerance   Encouraged mobilization per interdisciplinary team recommendations   Provided assistive devices   Provided rest periods between activities   Administered pain management to allow progressive mobilization   Collaborated with PT/OT     Problem: Self Care  Goal: Patient will have the ability to perform ADLs independently or with assistance (bathe, groom, dress, toilet and feed)  Outcome: Progressing   The patient is Stable - Low risk of patient condition declining or worsening    Shift Goals  Clinical Goals: pain mgt, surgery  Patient Goals: control pain, rest    Progress made toward(s)  clinical / shift goals:  improving    Patient is not progressing towards the following goals:

## 2023-01-28 NOTE — ANESTHESIA PREPROCEDURE EVALUATION
Case: [id number] Date/Time: 01/28/23 1218    Procedure: FIXATION, HIP, USING CANNULATED SCREW (Left)    Location: [facility] OR [facility] / SURGERY [facility]    Surgeons: Noman Abdul M.D.      Morbidly obese 31 yo female  Multiple medication allergies    P Med Hx:  BMI =40  Asthma  DM  TONYA  PCOS  Thyroid d/o  GERD  Scoliosis  Migraine ha's  H/o SVT    P Surg Hx:  Laparoscopy  Lumbar ALIF  Tonsillectomy  No reported problems with previous anesthesia    Non-smoking  Daily THC use    NPO    Relevant Problems   ANESTHESIA   (positive) TONYA (obstructive sleep apnea)      PULMONARY   (positive) Asthma exacerbation   (positive) Mild intermittent asthma without complication   (positive) Moderate asthma   (positive) Moderate persistent asthma with acute exacerbation      NEURO   (positive) Hemiplegic 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, not intractable   (positive) Migraine, hemiplegic   (positive) Seizures (HCC)      CARDIAC   (positive) Hemiplegic 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, not intractable   (positive) Inappropriate sinus tachycardia   (positive) Migraine, hemiplegic   (positive) SVT (supraventricular tachycardia) (HCC)      GI   (positive) GERD (gastroesophageal reflux disease)         (positive) Fatty liver disease, nonalcoholic       Physical Exam    Airway   Mallampati: III  TM distance: >3 FB  Neck ROM: full       Cardiovascular - normal exam  Rhythm: regular  Rate: normal  (-) murmur     Dental - normal exam           Pulmonary - normal exam  Breath sounds clear to auscultation     Abdominal    Neurological - normal exam               Anesthesia Plan    ASA 3   ASA physical status 3 criteria: morbid obesity - BMI greater than or equal to 40    Plan - general       Airway plan will be ETT          Induction: intravenous    Postoperative Plan: Postoperative administration of opioids is intended.    Pertinent diagnostic labs and testing reviewed    Informed Consent:    Anesthetic plan and risks discussed with  patient.    Use of blood products discussed with: patient whom consented to blood products.

## 2023-01-28 NOTE — ED NOTES
Pt requesting to have a couple people from her [employer] come and do a blessing on her. RN was advised and said it was okay. Pt also stating day shift was waiting for pharmacy to approve her pain medication; pt hasn't received her meds yet and RN was advised.

## 2023-01-28 NOTE — OP REPORT
DATE OF OPERATION: 1/28/2023     PREOPERATIVE DIAGNOSIS: left nondisplaced femoral neck fracture    POSTOPERATIVE DIAGNOSIS: Same    PROCEDURE PERFORMED: Percutaneous fixation of left femoral neck fracture    SURGEON: Noman Abdul M.D.     ASSISTANT: none    ANESTHESIA: General    SPECIMEN: None    ESTIMATED BLOOD LOSS: <5 mL    IMPLANTS: OIC 7.3 mm cannulated screws      INDICATIONS: Kristin Balderrama is a 32 y.o. female who presented with a left femoral neck fracture after a ground-level fall.  Initial imaging showed no obvious fracture.  A CT scan did show a likely crack involving the superior femoral neck.  She was unable to obtain an MRI due to spinal cord stimulator.  Based off the CT findings and her clinical exam though it seemed that she had a nondisplaced left femoral neck fracture.  Recommended percutaneous screw fixation.  I discussed the risks and benefits of the procedure which include but are not limited to risks of infection, wound healing complication, neurovascular injury, pain, malunion, non-union, possible need for additional pocedures and the medical risks of anesthesia including MI, stroke, and death.  Alternatives to surgery were also discussed, including non-operative management, which I did not recommend.  The patient was in agreement with the plan to proceed, and the informed consent was signed and documented.  I met with the patient pre-operatively and marked the operative extremity with their agreement.  We proceeded to the operating room.     DESCRIPTION OF PROCEDURE:  The patient was seen in the preoperative holding area on the day of surgery. The operative site was marked with my initials.  she was taken to the operating room and placed supine on the fracture table.  Anesthesia was induced.  Both feet were secured with a ski boot foot holders.  Imaging was brought in and confirmed the fracture remained valgus impacted.  The operative extremity was prepped and draped in the  normal sterile fashion.  Operative pause was conducted and the correct patient, site, side, procedure, and surgeon's initials on extremity were identified.  3 percutaneous pins were placed into the femoral neck.  These were placed in an inverted triangle fashion.  These were placed to maximize the fill of the femoral neck to prevent any further shifting of the fracture.  The position of the pins were confirmed on AP and lateral views to be extra-articular.  A skin incision was then made over each pin and these were measured for length.  1 partially-threaded and 2 fully threaded OIC 7.3 mm cannulated screws were then inserted over these pins.  These were sequentially tightened.  The pins were then removed and final fluoroscopic images were obtained.  The surgical wounds were irrigated and closed with nylon suture.  Sterile dressings with Xeroform, 4 x 4 gauze, Tegaderm were placed.  The patient was allowed to wake in the operating room taken to PACU in stable condition.    POSTOPERATIVE PLAN: As tolerated weight bearing.  Mobilize with physical and occupational therapies.  DVT prophylaxis with SCDs and Lovenox until mobilizing independently and then can be switched to aspirin for 4 weeks.  The patient will follow up in clinic in 2 weeks to check wounds and remove sutures/staples and repeat x-rays.      ____________________________________   Noman Abdul M.D.   DD: 1/28/2023  2:49 PM

## 2023-01-28 NOTE — ANESTHESIA PROCEDURE NOTES
Airway    Date/Time: 1/28/2023 1:40 PM  Performed by: John Akbar M.D.  Authorized by: John Akbar M.D.     Location:  OR  Urgency:  Elective  Indications for Airway Management:  Anesthesia      Spontaneous Ventilation: absent    Sedation Level:  Deep  Preoxygenated: Yes    Patient Position:  Sniffing  Mask Difficulty Assessment:  1 - vent by mask  Final Airway Type:  Endotracheal airway  Final Endotracheal Airway:  ETT  Cuffed: Yes    Technique Used for Successful ETT Placement:  Direct laryngoscopy    Insertion Site:  Oral  Blade Type:  Aurora  Laryngoscope Blade/Videolaryngoscope Blade Size:  3  ETT Size (mm):  7.0  Measured from:  Teeth  ETT to Teeth (cm):  22  Placement Verified by: auscultation and capnometry    Cormack-Lehane Classification:  Grade I - full view of glottis  Number of Attempts at Approach:  1

## 2023-01-28 NOTE — PROGRESS NOTES
4 Eyes Skin Assessment Completed by SONYA Jacob and SONYA Zepeda.    Head WDL  Ears WDL  Nose WDL  Mouth WDL  Neck WDL  Breast/Chest WDL  Shoulder Blades WDL  Spine WDL  (R) Arm/Elbow/Hand WDL  (L) Arm/Elbow/Hand WDL  Abdomen Bruising  Groin WDL  Scrotum/Coccyx/Buttocks WDL  (R) Leg WDL  (L) Leg WDL  (R) Heel/Foot/Toe WDL  (L) Heel/Foot/Toe WDL          Devices In Places Pulse Ox      Interventions In Place Pillows    Possible Skin Injury No    Pictures Uploaded Into Epic N/A  Wound Consult Placed N/A  RN Wound Prevention Protocol Ordered No

## 2023-01-28 NOTE — ANESTHESIA TIME REPORT
Anesthesia Start and Stop Event Times     Date Time Event    1/28/2023 1326 Ready for Procedure     1334 Anesthesia Start     1432 Anesthesia Stop        Responsible Staff  01/28/23    Name Role Begin End    John Akbar M.D. Anesth 1334 1432        Overtime Reason:  no overtime (within assigned shift)    Comments:

## 2023-01-28 NOTE — H&P (VIEW-ONLY)
"Date of Service: 01/27/23    CC: Pain of the Left Hip      HPI: Kristin Balderrama is a 32 y.o. female who presents to establish orthopedic care for left hip pain. Per report the patient sustained an injury on 1/25/2023 after fall from standing. Pain is worse with movement and relieved by rest. Denies any numbness or tingling.       She was initially seen at [facility] and sent for advanced imaging due to concern for a possible occult femoral neck fracture.  Since her injury the patient has been unable to bear weight to her left leg.  She has been in a wheelchair.  She has severe pain requiring hydrocodone for pain control.    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    HPI  Pain Assessment  Pain Assessment: 0-10  Pain Score: 8  Pain Location: Hip  Pain Orientation: Left  Pain Descriptors: Burning, Sharp  Pain Frequency: Constant/continuous                          PMH:   Past Medical History:   Diagnosis Date    Abdominal pain     Anginal syndrome     random chest pain especially with tachycardia    Apnea, sleep     Arrhythmia     \"sinus tachycardia\", cariologist, Dr. Kumar; ablation 2/2016    Arthritis     osteo    ASTHMA     when around smoke    Back pain     Borderline personality disorder (HCC)     Breath shortness     with tachycardia    Bronchitis 02/08/2022    Last time was 12/21    Cardiac arrhythmia     Chickenpox     Chronic UTI 09/18/2010    Cough     Daytime sleepiness     Dental disorder 03/08/2021    infected tooth    Depression     Diabetes (HCC)     Diarrhea     incontinece    Disorder of thyroid     Hashimoto's    Fall     Fatigue     Frequent headaches     Gasping for breath     Gynecological disorder     PCOS    Headache(784.0)     Heart burn     Heart murmur     History of falling     Indigestion     Migraine     Mitochondrial disease (HCC)     Multiple personality disorder (HCC)     Nausea     Obesity     Other fatigue 06/29/2020    Pain 08/15/2012    back, 7/10    Painful joint     PCOS " "(polycystic ovarian syndrome)     Pneumonia 2012 12/2020    Psychosis (HCC)     Ringing in ears     Scoliosis     Shortness of breath     O2 as needed    Sinus tachycardia 10/31/2013    Sleep apnea     CPAP \"pulmonary doctor took me off mid year 2016\"    Snoring     Tonsillitis     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HCC)     2/8/22: Per pt: not anymore    Tuberculosis     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    Urinary bladder disorder     Suprapubic cath. 2/8/22: Not anymore.     Urinary incontinence     Weakness     Wears glasses        PSH:   Past Surgical History:   Procedure Laterality Date    MT LAP,DIAGNOSTIC ABDOMEN  2/14/2022    Procedure: LAPAROSCOPY;  Surgeon: Seamus Pisano M.D.;  Location: SURGERY SAME DAY [facility];  Service: Gynecology    OVARIAN CYSTECTOMY Right 2/14/2022    Procedure: EXCISION, CYST, OVARY;  Surgeon: Seamus Pisano M.D.;  Location: SURGERY SAME DAY [facility];  Service: Gynecology    BOWEL STIMULATOR INSERTION  3/10/2021    Procedure: INSERTION, ELECTRODE LEADS AND PULSE GENERATOR, NEUROSTIMULATOR, SACRAL - REMOVAL OF INTERSTIM WITH REPLACEMENT OF SACRAL NEUROMODULATION DEVICE;  Surgeon: Joe Noyola M.D.;  Location: [facility];  Service: General    MUSCLE BIOPSY Right 1/26/2017    Procedure: MUSCLE BIOPSY - THIGH;  Surgeon: Isidro Vigil M.D.;  Location: [facility];  Service:     GASTROSCOPY WITH BALLOON DILATATION N/A 1/4/2017    Procedure: GASTROSCOPY WITH DILATATION;  Surgeon: Torres Vargas M.D.;  Location: [facility];  Service:     BOWEL STIMULATOR INSERTION  7/13/2016    Procedure: BOWEL STIMULATOR INSERTION FOR PERMANENT INTERSTIM SACRAL IMPLANT;  Surgeon: Joe Noyola M.D.;  Location: SURGERY [facility];  Service:     RECOVERY  1/27/2016    Procedure: CATH LAB EP STUDY WITH SINUS NODE MODIFICATION ABHINAV;  Surgeon: Recoveryonly Surgery;  Location: SURGERY PRE-POST PROC UNIT [facility];  Service:     OTHER CARDIAC SURGERY  1/2016    cardiac " ablation    NEURO DEST FACET L/S W/IG SNGL  4/21/2015    Performed by Reza Tabor at SURGERY [facility]    LUMBAR FUSION ANTERIOR  8/21/2012    Performed by SUSIE SAWANT at SURGERY [facility]S    ALYSSA BY LAPAROSCOPY  8/29/2010    Performed by SHAYY JOHNS at SURGERY [facility]S    LAMINOTOMY      OTHER ABDOMINAL SURGERY      TONSILLECTOMY      tonsillectomy       FH:   Family History   Problem Relation Age of Onset    Hypertension Mother     Sleep Apnea Mother     Heart Disease Mother     Other Mother         hypothryod    Hypertension Maternal Uncle     Heart Disease Maternal Grandmother     Hypertension Maternal Grandmother     No Known Problems Sister     Other Sister         Narcolepsy;fibromyalsia;bone;nerve    Genitourinary () Problems Sister         endometriosis       SH:   Social History     Socioeconomic History    Marital status: Single     Spouse name: Not on file    Number of children: Not on file    Years of education: Not on file    Highest education level: Not on file   Occupational History    Not on file   Tobacco Use    Smoking status: Never    Smokeless tobacco: Never   Vaping Use    Vaping Use: Never used   Substance and Sexual Activity    Alcohol use: No     Alcohol/week: 0.0 oz    Drug use: Not Currently     Frequency: 7.0 times per week     Types: Marijuana    Sexual activity: Not Currently     Birth control/protection: Implant   Other Topics Concern    Not on file   Social History Narrative    ** Merged History Encounter **          Social Determinants of Health     Financial Resource Strain: Not on file   Food Insecurity: Not on file   Transportation Needs: Not on file   Physical Activity: Not on file   Stress: Not on file   Social Connections: Not on file   Intimate Partner Violence: Not on file   Housing Stability: Not on file       ROS: 12 point review of systems reviewed the patient negative unless stated in the HPI.  Review of Systems    There were no vitals  taken for this visit.    Physical Exam:  General: Well nourished, well developed, age appropriate appearance   HEENT: Normocephalic, atraumatic  Psych: Normal mood and affect  Neck: Supple, no pain to motion  Chest/Pulmonary: breathing unlabored, no audible wheezing  Cardio: Well perfused extremities  Neuro: Sensation grossly intact to BUE and BLE, moving all four extremities  Skin: Intact with no full thickness abrasions or lacerations  MSK: Left lower extremity.  No open wounds skin is intact.  Severe pain with any passive gentle range of motion of the left hip.  She is able to just minimally flex her hip 5 to 10 degrees without having severe pain.  She can extend at the knee minimally.  Intact EHL FHL ankle dorsiflexion and plantarflexion down at the foot.  Sensation grossly intact to light touch throughout the left lower extremity.    Imaging and labs:   DX-HIP-COMPLETE - UNILATERAL 2+ LEFT  2 views of the left hip demonstrate a nondisplaced left femoral neck   fracture that is better seen on the CT scan in our PAC system.      CT of the pelvis from 1/25/2022 taken from an outside hospital demonstrates a nondisplaced left femoral neck fracture at the base of the femoral neck.      Assessment & Plan   Encounter Diagnoses:   Left hip pain    Closed fracture of neck of left femur, initial encounter ([facility])    Findings discussed in detail the patient.  Did review her imaging with her.  I explained her CT is consistent with a nondisplaced femoral neck fracture.  Given her age, and severe pain I am recommending she go straight to the emergency department for treatment of her femoral neck fracture.  She will likely undergo close reduction and percutaneous screw fixation to the left femoral neck fracture tomorrow with Dr. Abdul.    She understands the risk of further displacement of her fracture if she were to fall or have any additional trauma to her left hip.    Orders Placed This Encounter    DX-HIP-COMPLETE -  UNILATERAL 2+ LEFT       No follow-ups on file.

## 2023-01-28 NOTE — ED TRIAGE NOTES
Chief Complaint   Patient presents with    Hip Injury     L hip pain w/ L hip Fx, GLF on 1/24 onto L Hip. Pt states she was sent in by [facility] for further evaluation.         Full sensation to LLE per pt.     BP (!) 168/114   Pulse 76   Temp 36.7 °C (98.1 °F) (Temporal)   Resp 18   Wt 106 kg (234 lb)   SpO2 98%   BMI 40.17 kg/m²

## 2023-01-28 NOTE — ASSESSMENT & PLAN NOTE
Orthopedics saw fractures left-sided CT scan  Pain control with oral and IV narcotics  01/28 Percutaneous fixation of left femoral neck fracture  Multimodal pain approach  Await PT/OT

## 2023-01-28 NOTE — ANESTHESIA POSTPROCEDURE EVALUATION
Patient: Kristin Balderrama    Procedure Summary     Date: 01/28/23 Room / Location: [place] [id number] / SURGERY [facility]    Anesthesia Start: 1334 Anesthesia Stop: 1432    Procedure: FIXATION, HIP, USING CANNULATED SCREW (Left) Diagnosis: (Left Femoral Neck Fracture)    Surgeons: Noman Abdul M.D. Responsible Provider: John Akbar M.D.    Anesthesia Type: general ASA Status: 3          Final Anesthesia Type: general  Last vitals  BP   Blood Pressure: 118/66    Temp   36.1 °C (97 °F)    Pulse   67   Resp   18    SpO2   95 %      Anesthesia Post Evaluation    Patient location during evaluation: PACU  Patient participation: complete - patient participated  Level of consciousness: awake and alert  Pain score: 3    Airway patency: patent  Anesthetic complications: no  Cardiovascular status: hemodynamically stable  Respiratory status: acceptable  Hydration status: euvolemic    PONV: none          No notable events documented.     Nurse Pain Score: 8 (NPRS)

## 2023-01-29 ENCOUNTER — APPOINTMENT (OUTPATIENT)
Dept: RADIOLOGY | Facility: MEDICAL CENTER | Age: 33
DRG: 481 | End: 2023-01-29
Payer: MEDICARE

## 2023-01-29 LAB
ALBUMIN SERPL BCP-MCNC: 3.9 G/DL (ref 3.2–4.9)
BASOPHILS # BLD AUTO: 0.5 % (ref 0–1.8)
BASOPHILS # BLD: 0.03 K/UL (ref 0–0.12)
BUN SERPL-MCNC: 9 MG/DL (ref 8–22)
CALCIUM ALBUM COR SERPL-MCNC: 9 MG/DL (ref 8.5–10.5)
CALCIUM SERPL-MCNC: 8.9 MG/DL (ref 8.5–10.5)
CHLORIDE SERPL-SCNC: 106 MMOL/L (ref 96–112)
CO2 SERPL-SCNC: 20 MMOL/L (ref 20–33)
CREAT SERPL-MCNC: 0.54 MG/DL (ref 0.5–1.4)
EKG IMPRESSION: NORMAL
EOSINOPHIL # BLD AUTO: 0.05 K/UL (ref 0–0.51)
EOSINOPHIL NFR BLD: 0.8 % (ref 0–6.9)
ERYTHROCYTE [DISTWIDTH] IN BLOOD BY AUTOMATED COUNT: 39.6 FL (ref 35.9–50)
GFR SERPLBLD CREATININE-BSD FMLA CKD-EPI: 124 ML/MIN/1.73 M 2
GLUCOSE SERPL-MCNC: 167 MG/DL (ref 65–99)
HCT VFR BLD AUTO: 36.5 % (ref 37–47)
HGB BLD-MCNC: 12.5 G/DL (ref 12–16)
IMM GRANULOCYTES # BLD AUTO: 0.02 K/UL (ref 0–0.11)
IMM GRANULOCYTES NFR BLD AUTO: 0.3 % (ref 0–0.9)
LYMPHOCYTES # BLD AUTO: 1.19 K/UL (ref 1–4.8)
LYMPHOCYTES NFR BLD: 19 % (ref 22–41)
MAGNESIUM SERPL-MCNC: 1.8 MG/DL (ref 1.5–2.5)
MCH RBC QN AUTO: 30.9 PG (ref 27–33)
MCHC RBC AUTO-ENTMCNC: 34.2 G/DL (ref 33.6–35)
MCV RBC AUTO: 90.3 FL (ref 81.4–97.8)
MONOCYTES # BLD AUTO: 0.46 K/UL (ref 0–0.85)
MONOCYTES NFR BLD AUTO: 7.4 % (ref 0–13.4)
NEUTROPHILS # BLD AUTO: 4.5 K/UL (ref 2–7.15)
NEUTROPHILS NFR BLD: 72 % (ref 44–72)
NRBC # BLD AUTO: 0 K/UL
NRBC BLD-RTO: 0 /100 WBC
PHOSPHATE SERPL-MCNC: 3.5 MG/DL (ref 2.5–4.5)
PLATELET # BLD AUTO: 154 K/UL (ref 164–446)
PMV BLD AUTO: 12 FL (ref 9–12.9)
POTASSIUM SERPL-SCNC: 3.9 MMOL/L (ref 3.6–5.5)
RBC # BLD AUTO: 4.04 M/UL (ref 4.2–5.4)
SODIUM SERPL-SCNC: 136 MMOL/L (ref 135–145)
TROPONIN T SERPL-MCNC: <6 NG/L (ref 6–19)
WBC # BLD AUTO: 6.3 K/UL (ref 4.8–10.8)

## 2023-01-29 PROCEDURE — 84484 ASSAY OF TROPONIN QUANT: CPT

## 2023-01-29 PROCEDURE — 83735 ASSAY OF MAGNESIUM: CPT

## 2023-01-29 PROCEDURE — 97535 SELF CARE MNGMENT TRAINING: CPT

## 2023-01-29 PROCEDURE — 700102 HCHG RX REV CODE 250 W/ 637 OVERRIDE(OP): Performed by: INTERNAL MEDICINE

## 2023-01-29 PROCEDURE — 94660 CPAP INITIATION&MGMT: CPT

## 2023-01-29 PROCEDURE — 97166 OT EVAL MOD COMPLEX 45 MIN: CPT

## 2023-01-29 PROCEDURE — A9270 NON-COVERED ITEM OR SERVICE: HCPCS

## 2023-01-29 PROCEDURE — 85025 COMPLETE CBC W/AUTO DIFF WBC: CPT

## 2023-01-29 PROCEDURE — 93010 ELECTROCARDIOGRAM REPORT: CPT | Performed by: INTERNAL MEDICINE

## 2023-01-29 PROCEDURE — 700111 HCHG RX REV CODE 636 W/ 250 OVERRIDE (IP): Performed by: INTERNAL MEDICINE

## 2023-01-29 PROCEDURE — 97162 PT EVAL MOD COMPLEX 30 MIN: CPT

## 2023-01-29 PROCEDURE — 700102 HCHG RX REV CODE 250 W/ 637 OVERRIDE(OP)

## 2023-01-29 PROCEDURE — 80069 RENAL FUNCTION PANEL: CPT

## 2023-01-29 PROCEDURE — 36415 COLL VENOUS BLD VENIPUNCTURE: CPT

## 2023-01-29 PROCEDURE — 94760 N-INVAS EAR/PLS OXIMETRY 1: CPT

## 2023-01-29 PROCEDURE — A9270 NON-COVERED ITEM OR SERVICE: HCPCS | Performed by: INTERNAL MEDICINE

## 2023-01-29 PROCEDURE — 770001 HCHG ROOM/CARE - MED/SURG/GYN PRIV*

## 2023-01-29 PROCEDURE — 700102 HCHG RX REV CODE 250 W/ 637 OVERRIDE(OP): Performed by: HOSPITALIST

## 2023-01-29 PROCEDURE — 99232 SBSQ HOSP IP/OBS MODERATE 35: CPT | Performed by: INTERNAL MEDICINE

## 2023-01-29 PROCEDURE — A9270 NON-COVERED ITEM OR SERVICE: HCPCS | Performed by: HOSPITALIST

## 2023-01-29 PROCEDURE — 700105 HCHG RX REV CODE 258: Performed by: INTERNAL MEDICINE

## 2023-01-29 PROCEDURE — 93005 ELECTROCARDIOGRAM TRACING: CPT

## 2023-01-29 PROCEDURE — 71111 X-RAY EXAM RIBS/CHEST4/> VWS: CPT

## 2023-01-29 PROCEDURE — 700111 HCHG RX REV CODE 636 W/ 250 OVERRIDE (IP): Performed by: HOSPITALIST

## 2023-01-29 RX ORDER — GABAPENTIN 100 MG/1
100 CAPSULE ORAL 3 TIMES DAILY
Status: DISCONTINUED | OUTPATIENT
Start: 2023-01-29 | End: 2023-01-31 | Stop reason: HOSPADM

## 2023-01-29 RX ORDER — METHOCARBAMOL 500 MG/1
500 TABLET, FILM COATED ORAL 3 TIMES DAILY
Status: DISCONTINUED | OUTPATIENT
Start: 2023-01-29 | End: 2023-01-31 | Stop reason: HOSPADM

## 2023-01-29 RX ORDER — ENOXAPARIN SODIUM 100 MG/ML
40 INJECTION SUBCUTANEOUS DAILY
Status: DISCONTINUED | OUTPATIENT
Start: 2023-01-29 | End: 2023-01-31 | Stop reason: HOSPADM

## 2023-01-29 RX ADMIN — GABAPENTIN 100 MG: 100 CAPSULE ORAL at 12:15

## 2023-01-29 RX ADMIN — OXYCODONE HYDROCHLORIDE 5 MG: 5 TABLET ORAL at 06:28

## 2023-01-29 RX ADMIN — DIPHENHYDRAMINE HYDROCHLORIDE 50 MG: 25 TABLET ORAL at 20:42

## 2023-01-29 RX ADMIN — GABAPENTIN 100 MG: 100 CAPSULE ORAL at 16:51

## 2023-01-29 RX ADMIN — MORPHINE SULFATE 2 MG: 4 INJECTION, SOLUTION INTRAMUSCULAR; INTRAVENOUS at 10:16

## 2023-01-29 RX ADMIN — DEXTROSE AND SODIUM CHLORIDE: 5; 450 INJECTION, SOLUTION INTRAVENOUS at 01:15

## 2023-01-29 RX ADMIN — Medication 10 MG: at 20:42

## 2023-01-29 RX ADMIN — TRAZODONE HYDROCHLORIDE 100 MG: 50 TABLET ORAL at 20:42

## 2023-01-29 RX ADMIN — MORPHINE SULFATE 2 MG: 4 INJECTION, SOLUTION INTRAMUSCULAR; INTRAVENOUS at 16:51

## 2023-01-29 RX ADMIN — IVABRADINE 7.5 MG: 7.5 TABLET, FILM COATED ORAL at 09:22

## 2023-01-29 RX ADMIN — IVABRADINE 7.5 MG: 7.5 TABLET, FILM COATED ORAL at 16:52

## 2023-01-29 RX ADMIN — MORPHINE SULFATE 2 MG: 4 INJECTION, SOLUTION INTRAMUSCULAR; INTRAVENOUS at 13:43

## 2023-01-29 RX ADMIN — OXYCODONE HYDROCHLORIDE 5 MG: 5 TABLET ORAL at 09:22

## 2023-01-29 RX ADMIN — OXYCODONE HYDROCHLORIDE 5 MG: 5 TABLET ORAL at 12:15

## 2023-01-29 RX ADMIN — ZIPRASIDONE HYDROCHLORIDE 40 MG: 40 CAPSULE ORAL at 20:42

## 2023-01-29 RX ADMIN — METHOCARBAMOL 500 MG: 500 TABLET ORAL at 12:15

## 2023-01-29 RX ADMIN — SENNOSIDES AND DOCUSATE SODIUM 2 TABLET: 50; 8.6 TABLET ORAL at 16:51

## 2023-01-29 RX ADMIN — MORPHINE SULFATE 2 MG: 4 INJECTION, SOLUTION INTRAMUSCULAR; INTRAVENOUS at 04:55

## 2023-01-29 RX ADMIN — OXYCODONE HYDROCHLORIDE 5 MG: 5 TABLET ORAL at 19:17

## 2023-01-29 RX ADMIN — OXYCODONE HYDROCHLORIDE 5 MG: 5 TABLET ORAL at 15:12

## 2023-01-29 RX ADMIN — MAGNESIUM HYDROXIDE 30 ML: 400 SUSPENSION ORAL at 16:56

## 2023-01-29 RX ADMIN — ENOXAPARIN SODIUM 40 MG: 40 INJECTION SUBCUTANEOUS at 19:18

## 2023-01-29 RX ADMIN — METHOCARBAMOL 500 MG: 500 TABLET ORAL at 16:51

## 2023-01-29 RX ADMIN — SENNOSIDES AND DOCUSATE SODIUM 2 TABLET: 50; 8.6 TABLET ORAL at 04:55

## 2023-01-29 RX ADMIN — OXYCODONE HYDROCHLORIDE 5 MG: 5 TABLET ORAL at 03:12

## 2023-01-29 RX ADMIN — MORPHINE SULFATE 2 MG: 4 INJECTION, SOLUTION INTRAMUSCULAR; INTRAVENOUS at 20:42

## 2023-01-29 RX ADMIN — LIDOCAINE HYDROCHLORIDE 15 ML: 20 SOLUTION OROPHARYNGEAL at 19:18

## 2023-01-29 ASSESSMENT — ENCOUNTER SYMPTOMS
HEADACHES: 0
NAUSEA: 0
SHORTNESS OF BREATH: 0
DIARRHEA: 0
COUGH: 0
BLURRED VISION: 0
SORE THROAT: 0
DOUBLE VISION: 0
SEIZURES: 0
FALLS: 1
HALLUCINATIONS: 0
FEVER: 0
ABDOMINAL PAIN: 0
PALPITATIONS: 0
CHILLS: 0
VOMITING: 0

## 2023-01-29 ASSESSMENT — COGNITIVE AND FUNCTIONAL STATUS - GENERAL
MOVING TO AND FROM BED TO CHAIR: A LITTLE
MOVING FROM LYING ON BACK TO SITTING ON SIDE OF FLAT BED: A LITTLE
TOILETING: A LITTLE
DRESSING REGULAR LOWER BODY CLOTHING: A LITTLE
DAILY ACTIVITIY SCORE: 21
SUGGESTED CMS G CODE MODIFIER DAILY ACTIVITY: CJ
SUGGESTED CMS G CODE MODIFIER MOBILITY: CK
CLIMB 3 TO 5 STEPS WITH RAILING: A LOT
WALKING IN HOSPITAL ROOM: A LITTLE
MOBILITY SCORE: 17
HELP NEEDED FOR BATHING: A LITTLE
TURNING FROM BACK TO SIDE WHILE IN FLAT BAD: A LITTLE
STANDING UP FROM CHAIR USING ARMS: A LITTLE

## 2023-01-29 ASSESSMENT — GAIT ASSESSMENTS
GAIT LEVEL OF ASSIST: MINIMAL ASSIST
ASSISTIVE DEVICE: FRONT WHEEL WALKER
DEVIATION: ANTALGIC;BRADYKINETIC
DISTANCE (FEET): 5

## 2023-01-29 ASSESSMENT — ACTIVITIES OF DAILY LIVING (ADL): TOILETING: INDEPENDENT

## 2023-01-29 ASSESSMENT — PAIN DESCRIPTION - PAIN TYPE
TYPE: SURGICAL PAIN;ACUTE PAIN
TYPE: SURGICAL PAIN

## 2023-01-29 ASSESSMENT — LIFESTYLE VARIABLES: SUBSTANCE_ABUSE: 0

## 2023-01-29 NOTE — CARE PLAN
The patient is Stable - Low risk of patient condition declining or worsening      Problem: Knowledge Deficit - Standard  Goal: Patient and family/care givers will demonstrate understanding of plan of care, disease process/condition, diagnostic tests and medications  Outcome: Progressing  Patient updated on POC. All questions answered.       Problem: Communication  Goal: The ability to communicate needs accurately and effectively will improve  Outcome: Progressing  Pt communicates needs effectively.   Call light w/in reach.

## 2023-01-29 NOTE — PROGRESS NOTES
Pt up from PACU. Vitals are stable, pt on 5L oxy mask. Pt given PRN oxycodone for pain. Pt tolerating regular diet, she ate a cheese stick and drank some apple juice. IVF restarted. Dressing to L hip CDI.

## 2023-01-29 NOTE — CARE PLAN
The patient is Stable - Low risk of patient condition declining or worsening    Shift Goals  Clinical Goals: Pain control, safety  Patient Goals: Pain control, rest    Progress made toward(s) clinical / shift goals:    Problem: Pain - Standard  Goal: Alleviation of pain or a reduction in pain to the patient’s comfort goal  Outcome: Progressing  Note: Pt states pain is relieved with current pain medication regimen. Pt medicated per MAR.      Problem: Fall Risk  Goal: Patient will remain free from falls  Outcome: Progressing  Note: Pt educated on importance of use of call light when needing assistance. Bed locked in lowest position, hourly rounding in place, no DME at bedside, call light and side table within reach.

## 2023-01-29 NOTE — PROGRESS NOTES
"   Orthopaedic Progress Note    Interval changes:  Patient doing well post op  Dressings CDI  Cleared for DC by ortho pending medicine clearance    ROS - Patient denies any new issues.  Pain well controlled.    /57   Pulse 66   Temp 36.5 °C (97.7 °F) (Temporal)   Resp 15   Ht 1.626 m (5' 4\")   Wt 106 kg (234 lb)   SpO2 97%       Patient seen and examined  No acute distress  Breathing non labored  RRR  LLE surgical dressing is clean, dry, and intact. Patient clearly fires tibialis anterior, EHL, and gastrocnemius/soleus. Sensation is intact to light touch throughout superficial peroneal, deep peroneal, tibial, saphenous, and sural nerve distributions. Strong and palpable 2+ dorsalis pedis and posterior tibial pulses with capillary refill less than 2 seconds. No lower leg tenderness or discomfort.      Recent Labs     01/28/23  0442 01/29/23  0533   WBC 5.0 6.3   RBC 4.21 4.04*   HEMOGLOBIN 13.0 12.5   HEMATOCRIT 37.9 36.5*   MCV 90.0 90.3   MCH 30.9 30.9   MCHC 34.3 34.2   RDW 40.5 39.6   PLATELETCT 146* 154*   MPV 11.9 12.0       Active Hospital Problems    Diagnosis     TONYA (obstructive sleep apnea) [G47.33]      Priority: Medium    Schizophrenia ([facility]) [F20.9]      Priority: Low    Morbidly obese ([facility]) [E66.01]      Priority: Low     IMO load March 2020      Postural orthostatic tachycardia syndrome [G90.A]     Hip fracture, left, closed, initial encounter ([facility]) [S72.002A]     Moderate asthma [J45.909]        Assessment/Plan:  Patient doing well post op  Dressings CDI  Cleared for DC by ortho pending medicine clearance  POD#1 S/P Percutaneous fixation of left femoral neck fracture  Wt bearing status - WBAT BLE  Wound care/Drains - Dressings to be changed every other day by nursing  Future Procedures - none planned   Lovenox: Start 1/29, Duration-until ambulatory > 150'  Sutures/Staples out- 14 days post operatively  PT/OT-initiated  Antibiotics: Perioperative completed  DVT Prophylaxis- TEDS/SCDs/Foot " pumps  Andrade-none needed  Case Coordination for Discharge Planning - Disposition home

## 2023-01-29 NOTE — PROGRESS NOTES
Hospital Medicine Daily Progress Note    Date of Service  1/29/2023    Chief Complaint  Kristin Balderrama is a 32 y.o. female admitted 1/27/2023 with fall    Hospital Course  No notes on file    Interval Problem Update  Patient was seen and examined at bedside.  No acute events overnight. Patient is resting comfortably in bed and in no acute distress.     01/28 Percutaneous fixation of left femoral neck fracture  Multimodal pain approach  Await PT/OT    I have discussed this patient's plan of care and discharge plan at IDT rounds today with Case Management, Nursing, Nursing leadership, and other members of the IDT team.    Consultants/Specialty  orthopedics    Code Status  Full Code    Disposition  Patient is not medically cleared for discharge.   Anticipate discharge to to home with organized home healthcare and close outpatient follow-up.  I have placed the appropriate orders for post-discharge needs.    Review of Systems  Review of Systems   Constitutional:  Negative for chills and fever.   HENT:  Negative for congestion and sore throat.    Eyes:  Negative for blurred vision and double vision.   Respiratory:  Negative for cough and shortness of breath.    Cardiovascular:  Negative for chest pain and palpitations.   Gastrointestinal:  Negative for abdominal pain, diarrhea, nausea and vomiting.   Genitourinary:  Negative for dysuria and frequency.   Musculoskeletal:  Positive for falls and joint pain.   Skin:  Negative for rash.   Neurological:  Negative for seizures and headaches.   Psychiatric/Behavioral:  Negative for hallucinations and substance abuse.       Physical Exam  Temp:  [36.1 °C (96.9 °F)-36.5 °C (97.7 °F)] 36.5 °C (97.7 °F)  Pulse:  [68-93] 72  Resp:  [16-26] 17  BP: (104-145)/(49-87) 139/74  SpO2:  [87 %-100 %] 96 %    Physical Exam  Vitals and nursing note reviewed.   Constitutional:       General: She is not in acute distress.     Appearance: She is obese. She is not toxic-appearing.       Comments: Pleasant, conversational   HENT:      Head: Normocephalic.      Right Ear: External ear normal.      Left Ear: External ear normal.      Nose: No congestion.      Mouth/Throat:      Mouth: Mucous membranes are moist.      Pharynx: No oropharyngeal exudate.   Eyes:      General: No scleral icterus.     Pupils: Pupils are equal, round, and reactive to light.   Cardiovascular:      Rate and Rhythm: Normal rate and regular rhythm.      Heart sounds: No murmur heard.  Pulmonary:      Breath sounds: No rhonchi.   Abdominal:      Palpations: Abdomen is soft. There is no mass.      Tenderness: There is no abdominal tenderness. There is no guarding.   Musculoskeletal:         General: No swelling or deformity.      Comments: Post surgical changes to left hip  Surgical dressing c/d/i     Skin:     Coloration: Skin is not jaundiced.      Findings: No bruising.   Neurological:      General: No focal deficit present.      Mental Status: She is alert and oriented to person, place, and time. Mental status is at baseline.   Psychiatric:         Mood and Affect: Mood normal.         Behavior: Behavior normal.       Fluids    Intake/Output Summary (Last 24 hours) at 1/29/2023 1210  Last data filed at 1/29/2023 0409  Gross per 24 hour   Intake 1100 ml   Output 2315 ml   Net -1215 ml       Laboratory  Recent Labs     01/28/23  0442 01/29/23  0533   WBC 5.0 6.3   RBC 4.21 4.04*   HEMOGLOBIN 13.0 12.5   HEMATOCRIT 37.9 36.5*   MCV 90.0 90.3   MCH 30.9 30.9   MCHC 34.3 34.2   RDW 40.5 39.6   PLATELETCT 146* 154*   MPV 11.9 12.0     Recent Labs     01/28/23  0442 01/29/23  0533   SODIUM 139 136   POTASSIUM 3.7 3.9   CHLORIDE 108 106   CO2 19* 20   GLUCOSE 93 167*   BUN 13 9   CREATININE 0.61 0.54   CALCIUM 9.2 8.9                   Imaging  DX-HIP-UNILATERAL-W/O PELVIS-2/3 VIEWS LEFT   Final Result      Intraoperative fluoroscopy spot images as described above.      DX-PORTABLE FLUORO > 1 HOUR    (Results Pending)         Assessment/Plan  * Hip fracture, left, closed, initial encounter (HCC)- (present on admission)  Assessment & Plan  Orthopedics saw fractures left-sided CT scan  Pain control with oral and IV narcotics  01/28 Percutaneous fixation of left femoral neck fracture  Multimodal pain approach  Await PT/OT    Postural orthostatic tachycardia syndrome  Assessment & Plan  Precipitating cause of patients fall  Follow with cardiology  Continue Corlanor    Schizophrenia ([facility])- (present on admission)  Assessment & Plan  Continue home Geodon    TONYA (obstructive sleep apnea)- (present on admission)  Assessment & Plan  Nocturnal CPAP     Moderate asthma  Assessment & Plan  Continue home inhalers  Not in exacerbation    Morbidly obese (HCC)- (present on admission)  Assessment & Plan  Due to excess caloric intake  BMI 40         VTE prophylaxis: SCDs/TEDs and enoxaparin ppx    I have performed a physical exam and reviewed and updated ROS and Plan today (1/29/2023). In review of yesterday's note (1/28/2023), there are no changes except as documented above.

## 2023-01-29 NOTE — CARE PLAN
The patient is Watcher - Medium risk of patient condition declining or worsening    Shift Goals  Clinical Goals: Pt will have adequte pain control  Patient Goals: Receive surgery for hip    Progress made toward(s) clinical / shift goals:        Problem: Pain - Standard  Goal: Alleviation of pain or a reduction in pain to the patient’s comfort goal  Outcome: Progressing  Note: Pt reports adequate pain control utilizing oxy and morphine regimen.  Pt was able to receive surgery on her L hip today       Patient is not progressing towards the following goals:

## 2023-01-29 NOTE — THERAPY
"Occupational Therapy   Initial Evaluation     Patient Name: Kristin Balderrama  Age:  32 y.o., Sex:  female  Medical Record #: [id number]  Today's Date: 1/29/2023    Precautions: Fall Risk, Weight Bearing As Tolerated Left Lower Extremity  Comments: EDS and POTS    Assessment  Patient is 32 y.o. female admitted after GLF. Pt has a complex past medical hx: schizophrenia, TONYA, asthma, obesity, POTS, anxiety, and EDS. Pt reports she has been functioning interdependently and recently attending school. This admission pt is dx w/hip fracture s/p percutaneous fixation WBAT. Today pt was motivated for OOB activity primarily limited by pain and decreased ROM of injured leg impacting ADL's and mobility.     Plan  Occupational Therapy Initial Treatment Plan   Treatment Interventions: Self Care / Activities of Daily Living, Neuro Re-Education / Balance, Therapeutic Exercises, Therapeutic Activity  Treatment Frequency: 4 Times per Week  Duration: Until Therapy Goals Met    DC Equipment Recommendations: Unable to determine at this time  Discharge Recommendations: Recommend post-acute placement for additional occupational therapy services prior to discharge home     Subjective  \" I don't think I can keep going to school\"     Objective     01/29/23 0940   Charge Group   OT Evaluation OT Evaluation Mod   Total Time Spent   OT Time Spent Yes   OT Evaluation (Minutes) 22   OT Total Time Spent (Calculated) 22   Initial Contact Note    Initial Contact Note Order Received and Verified, Occupational Therapy Evaluation in Progress with Full Report to Follow.   Prior Living Situation   Prior Services Home-Independent   Housing / Facility 1 Story House   Steps Into Home   (ramp)   Steps In Home 12   Bathroom Set up Walk In Shower;Grab Bars   Equipment Owned Front-Wheel Walker;Single Point Cane;Wheelchair;Grab Bar(s) In Tub / Shower   Lives with - Patient's Self Care Capacity Related Adult   Comments Lives in a house with her grandmother and " her aunt. They are unable to provide assist.   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   History of Falls   History of Falls Yes   Precautions   Precautions Fall Risk;Weight Bearing As Tolerated Left Lower Extremity   Comments EDS and POTS   Pain 0 - 10 Group   Location Hip   Location Orientation Left   Therapist Pain Assessment Nurse Notified;During Activity;5   Cognition    Cognition / Consciousness WDL   Level of Consciousness Alert   Comments pleasant and cooperative   Passive ROM Upper Body   Passive ROM Upper Body WDL   Active ROM Upper Body   Active ROM Upper Body  WDL   Strength Upper Body   Upper Body Strength  WDL   Sensation Upper Body   Upper Extremity Sensation  WDL   Upper Body Muscle Tone   Upper Body Muscle Tone  WDL   Neurological Concerns   Neurological Concerns No   Coordination Upper Body   Coordination WDL   Balance Assessment   Sitting Balance (Static) Fair   Sitting Balance (Dynamic) Fair   Standing Balance (Static) Fair -   Standing Balance (Dynamic) Fair -   Weight Shift Sitting Fair   Weight Shift Standing Poor   Comments w/fww   Bed Mobility    Supine to Sit Supervised   Comments up to chair post session   ADL Assessment   Grooming Standby Assist;Seated   Upper Body Dressing Supervision   Lower Body Dressing Maximal Assist   Toileting   (NT)   Comments limited by pain   How much help from another person does the patient currently need...   6 Clicks Daily Activity Score 21   Functional Mobility    Sit to Stand Minimal Assist   Bed, Chair, Wheelchair Transfer Minimal Assist   Mobility EOB >chair   Activity Tolerance   Comments c/o pain   Patient / Family Goals   Patient / Family Goal #1 to go home   Short Term Goals   Short Term Goal # 1 pt will complete grooming standing at sink w/spv   Short Term Goal # 2 pt will complete toielt txf w/spv   Short Term Goal # 3 pt will complete LB dressing w/spv   Education Group   Role of Occupational Therapist Patient Response Patient;Acceptance;Explanation;Demonstration   Occupational Therapy Initial Treatment Plan    Treatment Interventions Self Care / Activities of Daily Living;Neuro Re-Education / Balance;Therapeutic Exercises;Therapeutic Activity   Treatment Frequency 4 Times per Week   Duration Until Therapy Goals Met   Problem List   Problem List Decreased Active Daily Living Skills;Decreased Functional Mobility;Decreased Activity Tolerance;Impaired Postural Control / Balance   Anticipated Discharge Equipment and Recommendations   DC Equipment Recommendations Unable to determine at this time   Discharge Recommendations Recommend post-acute placement for additional occupational therapy services prior to discharge home   Interdisciplinary Plan of Care Collaboration   IDT Collaboration with  Nursing;Physical Therapist   Patient Position at End of Therapy Seated;Chair Alarm On;Call Light within Reach;Tray Table within Reach;Phone within Reach   Collaboration Comments RN aware of OT eval and pts efforts   Session Information   Date / Session Number  1/29 #21 (1/4, 2/4)

## 2023-01-29 NOTE — THERAPY
"Physical Therapy   Initial Evaluation     Patient Name: Kristin Balderrama  Age:  32 y.o., Sex:  female  Medical Record #: [id number]  Today's Date: 1/29/2023     Precautions  Precautions: Fall Risk;Weight Bearing As Tolerated Left Lower Extremity  Comments: EDS and POTS    Assessment  Kristin Balderrama is a 32 y.o. female who presented with a left femoral neck fracture after a ground-level fall and is now s/p ORIF.  She has a history of EDS, POTS and schizophrenia.  Patient is motivated to work with therapy and is able to get to EOB with assist for scooting. She needs increased time sitting at EOB prior to transfers to a chair.  She needs Angelita for 5' to the chair and is limited by L LE pain with weightbearing. She will benefit from placement for further therapy. Will continue to follow while in house. Recommend PM&R consult    Plan    Physical Therapy Initial Treatment Plan   Treatment Plan : Bed Mobility, Equipment, Gait Training, Neuro Re-Education / Balance, Stair Training, Therapeutic Activities, Therapeutic Exercise  Treatment Frequency: 4 Times per Week  Duration: Until Therapy Goals Met    DC Equipment Recommendations: Unable to determine at this time  Discharge Recommendations: Recommend post-acute placement for additional physical therapy services prior to discharge home       Subjective    \"I'm in a lot of pain?\"     Objective       01/29/23 0920   Precautions   Precautions Fall Risk;Weight Bearing As Tolerated Left Lower Extremity   Comments EDS and POTS   Vitals   Blood Pressure 139/74   Pain   Intervention Medication (see MAR)   Pain 0 - 10 Group   Location Hip   Location Orientation Left   Pain Rating Scale (NPRS) 8   Description Sharp;Stabbing   Comfort Goal Comfort with Movement;Perform Activity   Prior Living Situation   Prior Services Home-Independent   Housing / Facility [facility]   Steps Into Home   (ramp)   Steps In Home 12  (does not need to navigate them)   Bathroom Set up Walk In " Shower;Grab Bars   Equipment Owned Front-Wheel Walker;Single Point Cane;Wheelchair;Grab Bar(s) In Tub / Shower   Lives with - Patient's Self Care Capacity Related Adult   Comments Lives in a house with her grandmother and her aunt. They are unable to provide assist.   Prior Level of Functional Mobility   Bed Mobility Independent   Transfer Status Independent   Ambulation Independent   Distance Ambulation (Feet)   (short community distances)   Assistive Devices Used Single Point Cane   Comments reports that she has been going to OP PT for balance training   History of Falls   History of Falls Yes   Date of Last Fall   (reason fro admit adn frequent falls 2/2 POTS)   Cognition    Cognition / Consciousness WDL   Level of Consciousness Alert   Comments pleasant and cooperative   Active ROM Upper Body   Active ROM Upper Body  WDL   Strength Upper Body   Upper Body Strength  WDL   Active ROM Lower Body    Active ROM Lower Body  X   Comments   (L LE limtied by pain, DF to neutral and 40 deg knee extension in sitting)   Strength Lower Body   Lower Body Strength  X   Comments L LE as above, limtied by pain   Sensation Lower Body   Lower Extremity Sensation   WDL   Other Treatments   Other Treatments Provided Discused DC options   Balance Assessment   Sitting Balance (Static) Fair   Sitting Balance (Dynamic) Fair   Standing Balance (Static) Fair -   Standing Balance (Dynamic) Fair -   Weight Shift Sitting Fair   Weight Shift Standing Poor   Comments w/FWW, weight shifting limited by pain   Bed Mobility    Supine to Sit Supervised   Scooting Moderate Assist   Comments with HOB raised   Gait Analysis   Gait Level Of Assist Minimal Assist   Assistive Device Front Wheel Walker   Distance (Feet) 5   # of Times Distance was Traveled 1   Deviation Antalgic;Bradykinetic   Weight Bearing Status WBAT L LE   Comments inable to bear full weight on L LE   Functional Mobility   Sit to Stand Minimal Assist   Bed, Chair, Wheelchair Transfer  Minimal Assist   How much difficulty does the patient currently have...   Turning over in bed (including adjusting bedclothes, sheets and blankets)? 3   Sitting down on and standing up from a chair with arms (e.g., wheelchair, bedside commode, etc.) 3   Moving from lying on back to sitting on the side of the bed? 3   How much help from another person does the patient currently need...   Moving to and from a bed to a chair (including a wheelchair)? 3   Need to walk in a hospital room? 3   Climbing 3-5 steps with a railing? 2   6 clicks Mobility Score 17   Activity Tolerance   Sitting in Chair post session   Sitting Edge of Bed 10 min   Standing 5 min   Edema / Skin Assessment   Comments L hip dressing C/D/I   Patient / Family Goals    Patient / Family Goal #1 to walk indep   Short Term Goals    Short Term Goal # 1 in 6 visits patient will demo all functional trasnfers with sup and LRAD for safe DC   Short Term Goal # 2 in 6 visits patient will ambulate 200' sup w/LRAD for safe DC   Short Term Goal # 3 in 6 visits patient will demo bed mobility indep for safe DC   Education Group   Education Provided Role of Physical Therapist;Gait Training;Use of Assistive Device;Weight Bearing Precautions   Role of Physical Therapist Patient Response Patient;Acceptance;Explanation;Demonstration;Verbal Demonstration;Action Demonstration   Gait Training Patient Response Patient;Acceptance;Explanation;Demonstration;Verbal Demonstration;Action Demonstration   Use of Assistive Device Patient Response Patient;Acceptance;Explanation;Demonstration;Verbal Demonstration;Action Demonstration   Weight Bearing Precautions Patient Response Patient;Acceptance;Explanation;Demonstration;Verbal Demonstration;Action Demonstration   Physical Therapy Initial Treatment Plan    Treatment Plan  Bed Mobility;Equipment;Gait Training;Neuro Re-Education / Balance;Stair Training;Therapeutic Activities;Therapeutic Exercise   Treatment Frequency 4 Times per Week    Duration Until Therapy Goals Met   Problem List    Problems Pain;Impaired Bed Mobility;Impaired Transfers;Impaired Ambulation;Functional Strength Deficit;Impaired Balance;Decreased Activity Tolerance   Anticipated Discharge Equipment and Recommendations   DC Equipment Recommendations Unable to determine at this time   Discharge Recommendations Recommend post-acute placement for additional physical therapy services prior to discharge home     Petra Valera, PT, DPT, GCS

## 2023-01-30 PROCEDURE — A9270 NON-COVERED ITEM OR SERVICE: HCPCS | Performed by: HOSPITALIST

## 2023-01-30 PROCEDURE — 99232 SBSQ HOSP IP/OBS MODERATE 35: CPT | Performed by: INTERNAL MEDICINE

## 2023-01-30 PROCEDURE — 770001 HCHG ROOM/CARE - MED/SURG/GYN PRIV*

## 2023-01-30 PROCEDURE — 700111 HCHG RX REV CODE 636 W/ 250 OVERRIDE (IP): Performed by: INTERNAL MEDICINE

## 2023-01-30 PROCEDURE — 99223 1ST HOSP IP/OBS HIGH 75: CPT | Performed by: PHYSICAL MEDICINE & REHABILITATION

## 2023-01-30 PROCEDURE — 700102 HCHG RX REV CODE 250 W/ 637 OVERRIDE(OP): Performed by: HOSPITALIST

## 2023-01-30 PROCEDURE — 94760 N-INVAS EAR/PLS OXIMETRY 1: CPT

## 2023-01-30 PROCEDURE — 94660 CPAP INITIATION&MGMT: CPT

## 2023-01-30 PROCEDURE — A9270 NON-COVERED ITEM OR SERVICE: HCPCS | Performed by: INTERNAL MEDICINE

## 2023-01-30 PROCEDURE — 700102 HCHG RX REV CODE 250 W/ 637 OVERRIDE(OP): Performed by: INTERNAL MEDICINE

## 2023-01-30 RX ADMIN — IVABRADINE 7.5 MG: 7.5 TABLET, FILM COATED ORAL at 17:22

## 2023-01-30 RX ADMIN — METOPROLOL SUCCINATE 25 MG: 25 TABLET, EXTENDED RELEASE ORAL at 04:50

## 2023-01-30 RX ADMIN — METHOCARBAMOL 500 MG: 500 TABLET ORAL at 11:16

## 2023-01-30 RX ADMIN — IVABRADINE 7.5 MG: 7.5 TABLET, FILM COATED ORAL at 07:30

## 2023-01-30 RX ADMIN — SENNOSIDES AND DOCUSATE SODIUM 2 TABLET: 50; 8.6 TABLET ORAL at 04:51

## 2023-01-30 RX ADMIN — OXYCODONE HYDROCHLORIDE 5 MG: 5 TABLET ORAL at 15:05

## 2023-01-30 RX ADMIN — OXYCODONE HYDROCHLORIDE 5 MG: 5 TABLET ORAL at 07:31

## 2023-01-30 RX ADMIN — Medication 10 MG: at 21:21

## 2023-01-30 RX ADMIN — METHOCARBAMOL 500 MG: 500 TABLET ORAL at 17:23

## 2023-01-30 RX ADMIN — OXYCODONE HYDROCHLORIDE 5 MG: 5 TABLET ORAL at 01:55

## 2023-01-30 RX ADMIN — TRAZODONE HYDROCHLORIDE 100 MG: 50 TABLET ORAL at 21:20

## 2023-01-30 RX ADMIN — ENOXAPARIN SODIUM 40 MG: 40 INJECTION SUBCUTANEOUS at 17:23

## 2023-01-30 RX ADMIN — METHOCARBAMOL 500 MG: 500 TABLET ORAL at 04:50

## 2023-01-30 RX ADMIN — SENNOSIDES AND DOCUSATE SODIUM 2 TABLET: 50; 8.6 TABLET ORAL at 17:22

## 2023-01-30 RX ADMIN — OXYCODONE HYDROCHLORIDE 5 MG: 5 TABLET ORAL at 21:21

## 2023-01-30 RX ADMIN — ZIPRASIDONE HYDROCHLORIDE 40 MG: 40 CAPSULE ORAL at 21:20

## 2023-01-30 RX ADMIN — GABAPENTIN 100 MG: 100 CAPSULE ORAL at 17:23

## 2023-01-30 RX ADMIN — GABAPENTIN 100 MG: 100 CAPSULE ORAL at 11:16

## 2023-01-30 RX ADMIN — GABAPENTIN 100 MG: 100 CAPSULE ORAL at 04:51

## 2023-01-30 RX ADMIN — OXYCODONE HYDROCHLORIDE 5 MG: 5 TABLET ORAL at 18:14

## 2023-01-30 RX ADMIN — DIPHENHYDRAMINE HYDROCHLORIDE 50 MG: 25 TABLET ORAL at 21:21

## 2023-01-30 ASSESSMENT — PAIN DESCRIPTION - PAIN TYPE
TYPE: SURGICAL PAIN

## 2023-01-30 ASSESSMENT — ENCOUNTER SYMPTOMS
COUGH: 0
BLURRED VISION: 0
FALLS: 1
SEIZURES: 0
ABDOMINAL PAIN: 0
CHILLS: 0
VOMITING: 0
PALPITATIONS: 0
DOUBLE VISION: 0
FEVER: 0
SORE THROAT: 0
SHORTNESS OF BREATH: 0
HALLUCINATIONS: 0
NAUSEA: 0
DIARRHEA: 0
HEADACHES: 0

## 2023-01-30 ASSESSMENT — PAIN SCALES - WONG BAKER: WONGBAKER_NUMERICALRESPONSE: HURTS EVEN MORE

## 2023-01-30 ASSESSMENT — LIFESTYLE VARIABLES: SUBSTANCE_ABUSE: 0

## 2023-01-30 ASSESSMENT — FIBROSIS 4 INDEX: FIB4 SCORE: 1.51

## 2023-01-30 NOTE — PROGRESS NOTES
Patient stated she was experiencing left sided chest pain. Patient stated she did not feel SOB, nausea, or indigestion. HR was sustaining in the 80's. O2 was sustaining above 90% on room air. Paged hospitalist & orders placed. No changes noted from previous EKG per EDD Zacarias. X-ray for bilateral ribs completed. Patent given GI cocktail. Patient is currently resting & does not appear to be in any distress.   Report given to NOC RN.

## 2023-01-30 NOTE — CONSULTS
"    Physical Medicine and Rehabilitation Consultation              Date of initial consultation: 1/30/2023  Consulting provider: Bradley Chavez D.O.  Reason for consultation: assess for acute inpatient rehab appropriateness  LOS: 3 Day(s)    Chief complaint: Left hip fracture    HPI: The patient is a 32 y.o. right hand dominant female with a past medical history of asthma, Hashimoto's, sleep apnea, type 2 diabetes, pots syndrome and Annetta-Danlos syndrome;  who presented on 1/27/2023  4:51 PM with left hip pain after ground-level fall secondary to postural orthostatic tachycardia syndrome.  Patient was evaluated by orthopedic surgeon Dr. Noman Abdul MD who found left nondisplaced femoral neck fracture, and took patient to the OR on 1/28 for percutaneous fixation of left hip fracture    The patient currently reports left hip pain.  Patient endorses bowel movement today.  Patient endorses back pain, from a \"bad back\" for which she is on Medicare disability.  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  Patient is interested in IPR    ROS  Pertinent positives are mentioned in the HPI, all others reviewed and are negative.    Social Hx:  1 SH  Ramp to enter  With: Grandmother and aunt    THERAPY:  Restrictions: WBAT LLE  PT: Functional mobility   1/29: Walking 5 feet with front wheel walker at min assist    OT: ADLs  1/29: Max assist lower body dressing    SLP:   None    IMAGING:  XR hip 1/27/2023  2 views of the left hip demonstrate a nondisplaced left femoral neck   fracture that is better seen on the CT scan in our PAC system.    PROCEDURES:  Noman Abdul MD 1/28/2023  Percutaneous fixation of left femoral neck fracture    PMH:  Past Medical History:   Diagnosis Date    Abdominal pain     Anginal syndrome     random chest pain especially with tachycardia    Apnea, sleep     Arrhythmia     \"sinus tachycardia\", cariologist, Dr. Kumar; ablation 2/2016    Arthritis     osteo    " "ASTHMA     when around smoke    Back pain     Borderline personality disorder (HCC)     Breath shortness     with tachycardia    Bronchitis 02/08/2022    Last time was 12/21    Cardiac arrhythmia     Chickenpox     Chronic UTI 09/18/2010    Cough     Daytime sleepiness     Dental disorder 03/08/2021    infected tooth    Depression     Diabetes (HCC)     Diarrhea     incontinece    Disorder of thyroid     Hashimoto's    Fall     Fatigue     Frequent headaches     Gasping for breath     Gynecological disorder     PCOS    Headache(784.0)     Heart burn     Heart murmur     History of falling     Indigestion     Migraine     Mitochondrial disease (HCC)     Multiple personality disorder (HCC)     Nausea     Obesity     Other fatigue 06/29/2020    Pain 08/15/2012    back, 7/10    Painful joint     PCOS (polycystic ovarian syndrome)     Pneumonia 2012 12/2020    Psychosis (HCC)     Ringing in ears     Scoliosis     Shortness of breath     O2 as needed    Sinus tachycardia 10/31/2013    Sleep apnea     CPAP \"pulmonary doctor took me off mid year 2016\"    Snoring     Tonsillitis     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HCC)     2/8/22: Per pt: not anymore    Tuberculosis     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    Urinary bladder disorder     Suprapubic cath. 2/8/22: Not anymore.     Urinary incontinence     Weakness     Wears glasses        PSH:  Past Surgical History:   Procedure Laterality Date    AR LAP,DIAGNOSTIC ABDOMEN  2/14/2022    Procedure: LAPAROSCOPY;  Surgeon: Seamus Pisano M.D.;  Location: SURGERY SAME DAY [facility];  Service: Gynecology    OVARIAN CYSTECTOMY Right 2/14/2022    Procedure: EXCISION, CYST, OVARY;  Surgeon: Seamus Pisano M.D.;  Location: SURGERY SAME DAY [facility];  Service: Gynecology    BOWEL STIMULATOR INSERTION  3/10/2021    Procedure: INSERTION, ELECTRODE LEADS AND PULSE GENERATOR, NEUROSTIMULATOR, SACRAL - REMOVAL OF INTERSTIM WITH REPLACEMENT OF SACRAL NEUROMODULATION DEVICE;  Surgeon: Joe GRIJALVA" RUFUS Noyola;  Location: SURGERY [facility];  Service: General    MUSCLE BIOPSY Right 1/26/2017    Procedure: MUSCLE BIOPSY - THIGH;  Surgeon: Isidro Vigli M.D.;  Location: SURGERY [facility];  Service:     GASTROSCOPY WITH BALLOON DILATATION N/A 1/4/2017    Procedure: GASTROSCOPY WITH DILATATION;  Surgeon: Torres Vargas M.D.;  Location: SURGERY [facility];  Service:     BOWEL STIMULATOR INSERTION  7/13/2016    Procedure: BOWEL STIMULATOR INSERTION FOR PERMANENT INTERSTIM SACRAL IMPLANT;  Surgeon: Joe Noyola M.D.;  Location: SURGERY [facility];  Service:     RECOVERY  1/27/2016    Procedure: CATH LAB EP STUDY WITH SINUS NODE MODIFICATION LA;  Surgeon: Recoveryonly Surgery;  [place] [facility] [facility];  Service:     OTHER CARDIAC SURGERY  1/2016    cardiac ablation    NEURO DEST FACET L/S W/IG SNGL  4/21/2015    Performed by Reza Tabor at SURGERY [facility]    LUMBAR FUSION ANTERIOR  8/21/2012    Performed by SUSIE SAWANT at SURGERY [facility]S    ALYSSA BY LAPAROSCOPY  8/29/2010    Performed by SHAYY JOHNS at SURGERY [facility]S    LAMINOTOMY      OTHER ABDOMINAL SURGERY      TONSILLECTOMY      tonsillectomy       FHX:  Family History   Problem Relation Age of Onset    Hypertension Mother     Sleep Apnea Mother     Heart Disease Mother     Other Mother         hypothryod    Hypertension Maternal Uncle     Heart Disease Maternal Grandmother     Hypertension Maternal Grandmother     No Known Problems Sister     Other Sister         Narcolepsy;fibromyalsia;bone;nerve    Genitourinary () Problems Sister         endometriosis       Medications:  Current Facility-Administered Medications   Medication Dose    gabapentin (NEURONTIN) capsule 100 mg  100 mg    methocarbamol (ROBAXIN) tablet 500 mg  500 mg    enoxaparin (Lovenox) inj 40 mg  40 mg    senna-docusate (PERICOLACE or SENOKOT S) 8.6-50 MG per tablet 2 Tablet  2 Tablet    And    polyethylene  glycol/lytes (MIRALAX) PACKET 1 Packet  1 Packet    And    magnesium hydroxide (MILK OF MAGNESIA) suspension 30 mL  30 mL    And    bisacodyl (DULCOLAX) suppository 10 mg  10 mg    Respiratory Therapy Consult      acetaminophen (Tylenol) tablet 650 mg  650 mg    Pharmacy Consult Request ...Pain Management Review 1 Each  1 Each    oxyCODONE immediate-release (ROXICODONE) tablet 2.5 mg  2.5 mg    Or    oxyCODONE immediate-release (ROXICODONE) tablet 5 mg  5 mg    Or    morphine 4 MG/ML injection 2 mg  2 mg    labetalol (NORMODYNE/TRANDATE) injection 10 mg  10 mg    ondansetron (ZOFRAN) syringe/vial injection 4 mg  4 mg    ondansetron (ZOFRAN ODT) dispertab 4 mg  4 mg    promethazine (PHENERGAN) tablet 12.5-25 mg  12.5-25 mg    promethazine (PHENERGAN) suppository 12.5-25 mg  12.5-25 mg    prochlorperazine (COMPAZINE) injection 5-10 mg  5-10 mg    albuterol inhaler 2 Puff  2 Puff    diphenhydrAMINE (BENADRYL) tablet/capsule 50 mg  50 mg    ipratropium-albuterol (DUONEB) nebulizer solution  3 mL    ivabradine (Corlanor) tablet 7.5 mg  7.5 mg    melatonin tablet 10 mg  10 mg    metoprolol SR (TOPROL XL) tablet 25 mg  25 mg    traZODone (DESYREL) tablet 100 mg  100 mg    ziprasidone (GEODON) capsule 40 mg  40 mg       Allergies:  Allergies   Allergen Reactions    Cefdinir Shortness of Breath and Itching     Tolerated 1/18/17  Tolerates ceftriaxone  Tolerated augmentin 8/2019     Depakote [Divalproex Sodium] Unspecified     Muscle spasms/muscle pain and weakness      Doxycycline Anaphylaxis and Vomiting     Other reaction(s): pustules/blisters  Other reaction(s): stomach pain    Montelukast [Singulair] Unspecified     Cardiac effusion    Vancomycin Itching     Pt becomes flushed in face and chest.   RXN=7/10/16    Amitriptyline Unspecified     Headaches      Aripiprazole [Abilify] Unspecified     Headaches/muscle twitching      Clindamycin Nausea         Other reaction(s): nausea stomach pain    Flomax [Tamsulosin  "Hydrochloride] Swelling    Levaquin Unspecified     Severe muscle cramps in legs  RXN=unknown  Other reaction(s): leg muscle cramps    Metformin Unspecified     Increased lactic acid     Other reaction(s): itching and rash/nausea vomiting    Tamsulosin Swelling     Swelling of legs    Tape Rash     Tears skin off  coban with Tegaderm tape ok intermittently  RXN=ongoing    Wound Dressing Adhesive Hives     By pt report    Amoxicillin Rash    Depakote [Divalproex Sodium]     Erythromycin Rash     .  Other reaction(s): nausea stomach pain    Hydromorphone      Other reaction(s): vomiting    Ampicillin Rash     Pt reports that she received a rash     Ciprofloxacin Rash          Keflex Rash     Pt states she gets a rash with this medication  Tolerates ceftriaxone  Can take with Benadryl    Levofloxacin Unspecified     Leg muscle cramps    Metronidazole Rash     \"Vision problems\"  Other reaction(s): vision problems    Penicillins Hives     Can take with Benadryl    Sulfa Drugs Itching and Myalgia     Muscle pain and weakness  Other reaction(s): unknown    Valproic Acid Rash     .         Physical Exam:  Vitals: /74   Pulse 72   Temp 36.5 °C (97.7 °F) (Temporal)   Resp 20   Ht 1.626 m (5' 4\")   Wt 106 kg (234 lb)   SpO2 93%   Gen: NAD  Head: NC/AT  Eyes/ Nose/ Mouth: PERRLA, moist mucous membranes  Cardio: RRR, good distal perfusion, warm extremities  Pulm: normal respiratory effort, no cyanosis   Abd: Soft NTND, negative borborygmi   Ext: No peripheral edema. No calf tenderness. No clubbing.    Mental status:  A&Ox4 (person, place, date, situation) answers questions appropriately follows commands  Speech: fluent, no aphasia or dysarthria    Motor:      Upper Extremity  Myotome R L   Shoulder flexion C5 5 5   Elbow flexion C5 5 5   Wrist extension C6 5 5   Elbow extension C7 5 5   Finger flexion C8 5 5   Finger abduction T1 5 5     Lower Extremity Myotome R L   Hip flexion L2 5 2/5   Knee extension L3 5 3/5 "   Ankle dorsiflexion L4 5 4/5   Toe extension L5 5 4/5   Ankle plantarflexion S1 5 4/5     Sensory:   intact to light touch through out    Labs: Reviewed and significant for   Recent Labs     232 23  0533   RBC 4.21 4.04*   HEMOGLOBIN 13.0 12.5   HEMATOCRIT 37.9 36.5*   PLATELETCT 146* 154*     Recent Labs     23  0442 23  0533   SODIUM 139 136   POTASSIUM 3.7 3.9   CHLORIDE 108 106   CO2 19* 20   GLUCOSE 93 167*   BUN 13 9   CREATININE 0.61 0.54   CALCIUM 9.2 8.9     Recent Results (from the past 24 hour(s))   EKG    Collection Time: 23  6:07 PM   Result Value Ref Range    Report       [facility]    Test Date:  2023  Pt Name:    HARLEY DE LA CRUZ              Department: 131  MRN:        [id number]                      Room:       [facility]  Gender:     Female                       Technician: [facility]  :        1990                   Requested By:DANNIELLE JUNG  Order #:    [id number]                    Reading MD: Abhishek Smith MD    Measurements  Intervals                                Axis  Rate:       82                           P:          24  SD:         128                          QRS:        21  QRSD:       95                           T:          14  QT:         385  QTc:        450    Interpretive Statements  SINUS RHYTHM  BORDERLINE T WAVE ABNORMALITIES  Compared to ECG 12/10/2022 17:22:05  No significant changes  Electronically Signed On 2023 23:56:50 PST by Abhishek Smith MD     TROPONIN    Collection Time: 23  8:33 PM   Result Value Ref Range    Troponin T <6 6 - 19 ng/L         ASSESSMENT:  Patient is a 32 y.o. female admitted with left hip fracture secondary to syncope from POTS, now s/p percutaneous fixation by Dr. Abdul on 2023    [facility] Code / Diagnosis to Support: 0008.51 - Orthopaedic Disorders: Status Post Unilateral Hip Replacement    Rehabilitation: Impaired ADLs and mobility  Patient is a good candidate for  inpatient rehab based on needs for PT, OT.  Patient will also benefit from family training.  Patient has a good discharge situation which will be home with family.     Barriers to transfer include: Insurance authorization, TCCs to verify disposition, medical clearance and bed availability     All cases are subject to administrative review and recommendations may change    Disposition recommendations:  -Good candidate for IPR, patient has deficits with mobility and ADL secondary to left hip fracture.  Patient has good family support from her aunt and grandmother.  Patient has a stable discharge location which is her single-story home with ramp to enter  -TCC to verify discharge support  -PMR to follow in the periphery for rehab appropriateness, please reach out with questions or request for medical management    Medical Complexity:    Left hip fracture  -Status post percutaneous fixation by Dr. Abdul on 1/28/2023  -WBAT LLE  -Postop pain control per primary team  Continue PT OT while in house-  -plan for IPR    Postural orthostatic tachycardia syndrome  -High risk for syncope  -Metoprolol for rate control  DVT PPX: Lovenox      Thank you for allowing us to participate in the care of this patient.     Patient was seen for 82 minutes on unit/floor of which > 50% of time was spent on counseling and coordination of care regarding the above, including prognosis, risk reduction, benefits of treatment, and options for next stage of care.    Keenan Grissom, DO   Physical Medicine and Rehabilitation     Please note that this dictation was created using voice recognition software. I have made every reasonable attempt to correct obvious errors, but there may be errors of grammar and possibly content that I did not discover before finalizing the note.

## 2023-01-30 NOTE — CARE PLAN
The patient is Stable - Low risk of patient condition declining or worsening    Shift Goals  Clinical Goals: Pain control, safety  Patient Goals: Pain control, rest    Progress made toward(s) clinical / shift goals:    Problem: Knowledge Deficit - Standard  Goal: Patient and family/care givers will demonstrate understanding of plan of care, disease process/condition, diagnostic tests and medications  Outcome: Progressing  Note: Patient educated on plan of care, plan for discharge to rehab, verbalizes understanding, all questions answered. Pt encouraged to voice feelings or concerns.      Problem: Pain - Standard  Goal: Alleviation of pain or a reduction in pain to the patient’s comfort goal  Outcome: Progressing  Note: Pt states pain is relieved with current pain medication regimen. Pt medicated per MAR. Pt provided ice packs and pillows to relieve pain.

## 2023-01-30 NOTE — DISCHARGE PLANNING
[facility] Transitional Care Coordination    Referral from: Dr Chavez   Insurance Provider on Facesheet: Medicare/Medicaid  Potential Rehab Diagnosis: Hip fx    Chart review indicates patient may have on going medical management and may have therapy needs to possibly meet inpatient rehab facility criteria with the goal of returning to community.    D/C support: Lives with grandmother and aunt who cannot provide assist     Physiatry consultation forwarded per protocol.     Hip fx - physiatry to consult, TCC will follow.     Thank you for the referral.

## 2023-01-30 NOTE — PROGRESS NOTES
Hospital Medicine Daily Progress Note    Date of Service  1/30/2023    Chief Complaint  Kristin Balderrama is a 32 y.o. female admitted 1/27/2023 with fall    Hospital Course  No notes on file    Interval Problem Update  Patient was seen and examined at bedside.  No acute events overnight. Patient is resting comfortably in bed and in no acute distress.     01/28 Percutaneous fixation of left femoral neck fracture  Multimodal pain approach  Await PT/OT    I have discussed this patient's plan of care and discharge plan at IDT rounds today with Case Management, Nursing, Nursing leadership, and other members of the IDT team.    Consultants/Specialty  orthopedics    Code Status  Full Code    Disposition  Patient is medically cleared for discharge.   Anticipate discharge to to home with organized home healthcare and close outpatient follow-up.  I have placed the appropriate orders for post-discharge needs.    Review of Systems  Review of Systems   Constitutional:  Negative for chills and fever.   HENT:  Negative for congestion and sore throat.    Eyes:  Negative for blurred vision and double vision.   Respiratory:  Negative for cough and shortness of breath.    Cardiovascular:  Negative for chest pain and palpitations.   Gastrointestinal:  Negative for abdominal pain, diarrhea, nausea and vomiting.   Genitourinary:  Negative for dysuria and frequency.   Musculoskeletal:  Positive for falls and joint pain.   Skin:  Negative for rash.   Neurological:  Negative for seizures and headaches.   Psychiatric/Behavioral:  Negative for hallucinations and substance abuse.       Physical Exam  Temp:  [36.5 °C (97.7 °F)-37.1 °C (98.8 °F)] 36.8 °C (98.2 °F)  Pulse:  [61-87] 69  Resp:  [15-20] 18  BP: ()/(50-74) 91/50  SpO2:  [92 %-97 %] 93 %    Physical Exam  Vitals and nursing note reviewed.   Constitutional:       General: She is not in acute distress.     Appearance: She is obese. She is not toxic-appearing.      Comments:  Pleasant, conversational   HENT:      Head: Normocephalic.      Right Ear: External ear normal.      Left Ear: External ear normal.      Nose: No congestion.      Mouth/Throat:      Mouth: Mucous membranes are moist.      Pharynx: No oropharyngeal exudate.   Eyes:      General: No scleral icterus.     Pupils: Pupils are equal, round, and reactive to light.   Cardiovascular:      Rate and Rhythm: Normal rate and regular rhythm.      Heart sounds: No murmur heard.  Pulmonary:      Breath sounds: No wheezing.   Abdominal:      Palpations: Abdomen is soft.      Tenderness: There is no abdominal tenderness. There is no guarding or rebound.   Musculoskeletal:         General: No swelling or deformity.      Comments: Post surgical changes to left hip  Surgical dressing c/d/i  Left lower extremity neurovascularly in tact   Skin:     Coloration: Skin is not jaundiced or pale.   Neurological:      General: No focal deficit present.      Mental Status: She is alert and oriented to person, place, and time. Mental status is at baseline.   Psychiatric:         Mood and Affect: Mood normal.         Behavior: Behavior normal.       Fluids  No intake or output data in the 24 hours ending 01/30/23 1536      Laboratory  Recent Labs     01/28/23  0442 01/29/23  0533   WBC 5.0 6.3   RBC 4.21 4.04*   HEMOGLOBIN 13.0 12.5   HEMATOCRIT 37.9 36.5*   MCV 90.0 90.3   MCH 30.9 30.9   MCHC 34.3 34.2   RDW 40.5 39.6   PLATELETCT 146* 154*   MPV 11.9 12.0     Recent Labs     01/28/23  0442 01/29/23  0533   SODIUM 139 136   POTASSIUM 3.7 3.9   CHLORIDE 108 106   CO2 19* 20   GLUCOSE 93 167*   BUN 13 9   CREATININE 0.61 0.54   CALCIUM 9.2 8.9                   Imaging  [id number] (WITH 1-VIEW CXR)   Final Result      Normal rib series.      DX-HIP-UNILATERAL-W/O PELVIS-2/3 VIEWS LEFT   Final Result      Intraoperative fluoroscopy spot images as described above.      DX-PORTABLE FLUORO > 1 HOUR   Final Result      Portable fluoroscopy  utilized for 35 seconds.         INTERPRETING LOCATION: [address] [place], [place] [address], [id number]           Assessment/Plan  * Hip fracture, left, closed, initial encounter (HCC)- (present on admission)  Assessment & Plan  Orthopedics saw fractures left-sided CT scan  Pain control with oral and IV narcotics  01/28 Percutaneous fixation of left femoral neck fracture  Multimodal pain approach  Await PT/OT    Postural orthostatic tachycardia syndrome  Assessment & Plan  Precipitating cause of patients fall  Follow with cardiology  Continue Corlanor    Schizophrenia (HCC)- (present on admission)  Assessment & Plan  Continue home Geodon    TONYA (obstructive sleep apnea)- (present on admission)  Assessment & Plan  Nocturnal CPAP     Moderate asthma  Assessment & Plan  Continue home inhalers  Not in exacerbation    Morbidly obese (HCC)- (present on admission)  Assessment & Plan  Due to excess caloric intake  BMI 40         VTE prophylaxis: SCDs/TEDs and enoxaparin ppx    I have performed a physical exam and reviewed and updated ROS and Plan today (1/30/2023). In review of yesterday's note (1/29/2023), there are no changes except as documented above.

## 2023-01-31 ENCOUNTER — HOSPITAL ENCOUNTER (INPATIENT)
Facility: REHABILITATION | Age: 33
LOS: 10 days | DRG: 560 | End: 2023-02-10
Attending: PHYSICAL MEDICINE & REHABILITATION | Admitting: PHYSICAL MEDICINE & REHABILITATION
Payer: MEDICARE

## 2023-01-31 VITALS
DIASTOLIC BLOOD PRESSURE: 63 MMHG | TEMPERATURE: 97.9 F | SYSTOLIC BLOOD PRESSURE: 129 MMHG | WEIGHT: 231.48 LBS | HEART RATE: 73 BPM | HEIGHT: 64 IN | BODY MASS INDEX: 39.52 KG/M2 | OXYGEN SATURATION: 92 % | RESPIRATION RATE: 18 BRPM

## 2023-01-31 DIAGNOSIS — Z87.81 S/P LEFT HIP FRACTURE: ICD-10-CM

## 2023-01-31 DIAGNOSIS — G90.A POSTURAL ORTHOSTATIC TACHYCARDIA SYNDROME: ICD-10-CM

## 2023-01-31 DIAGNOSIS — F25.1 SCHIZOAFFECTIVE DISORDER, DEPRESSIVE TYPE (HCC): ICD-10-CM

## 2023-01-31 DIAGNOSIS — S72.002A HIP FRACTURE, LEFT, CLOSED, INITIAL ENCOUNTER (HCC): ICD-10-CM

## 2023-01-31 LAB
FLUAV RNA SPEC QL NAA+PROBE: NEGATIVE
FLUBV RNA SPEC QL NAA+PROBE: NEGATIVE
RSV RNA SPEC QL NAA+PROBE: NEGATIVE
SARS-COV-2 RNA RESP QL NAA+PROBE: NOTDETECTED
SPECIMEN SOURCE: NORMAL

## 2023-01-31 PROCEDURE — 700111 HCHG RX REV CODE 636 W/ 250 OVERRIDE (IP): Performed by: PHYSICAL MEDICINE & REHABILITATION

## 2023-01-31 PROCEDURE — 99024 POSTOP FOLLOW-UP VISIT: CPT | Performed by: ORTHOPAEDIC SURGERY

## 2023-01-31 PROCEDURE — A9270 NON-COVERED ITEM OR SERVICE: HCPCS | Performed by: PHYSICAL MEDICINE & REHABILITATION

## 2023-01-31 PROCEDURE — 700102 HCHG RX REV CODE 250 W/ 637 OVERRIDE(OP): Performed by: HOSPITALIST

## 2023-01-31 PROCEDURE — 97116 GAIT TRAINING THERAPY: CPT

## 2023-01-31 PROCEDURE — A9270 NON-COVERED ITEM OR SERVICE: HCPCS | Performed by: INTERNAL MEDICINE

## 2023-01-31 PROCEDURE — 97530 THERAPEUTIC ACTIVITIES: CPT

## 2023-01-31 PROCEDURE — 700102 HCHG RX REV CODE 250 W/ 637 OVERRIDE(OP): Performed by: PHYSICAL MEDICINE & REHABILITATION

## 2023-01-31 PROCEDURE — 99239 HOSP IP/OBS DSCHRG MGMT >30: CPT | Performed by: HOSPITALIST

## 2023-01-31 PROCEDURE — 700102 HCHG RX REV CODE 250 W/ 637 OVERRIDE(OP): Performed by: INTERNAL MEDICINE

## 2023-01-31 PROCEDURE — 99223 1ST HOSP IP/OBS HIGH 75: CPT | Performed by: PHYSICAL MEDICINE & REHABILITATION

## 2023-01-31 PROCEDURE — A9270 NON-COVERED ITEM OR SERVICE: HCPCS | Performed by: HOSPITALIST

## 2023-01-31 PROCEDURE — 770010 HCHG ROOM/CARE - REHAB SEMI PRIVAT*

## 2023-01-31 PROCEDURE — 94760 N-INVAS EAR/PLS OXIMETRY 1: CPT

## 2023-01-31 PROCEDURE — 0241U HCHG SARS-COV-2 COVID-19 NFCT DS RESP RNA 4 TRGT MIC: CPT

## 2023-01-31 RX ORDER — METHOCARBAMOL 500 MG/1
500 TABLET, FILM COATED ORAL 3 TIMES DAILY
Status: CANCELLED | OUTPATIENT
Start: 2023-01-31

## 2023-01-31 RX ORDER — OXYCODONE HYDROCHLORIDE 10 MG/1
10 TABLET ORAL
Status: DISCONTINUED | OUTPATIENT
Start: 2023-01-31 | End: 2023-02-10 | Stop reason: HOSPADM

## 2023-01-31 RX ORDER — OMEPRAZOLE 20 MG/1
20 CAPSULE, DELAYED RELEASE ORAL DAILY
Status: DISCONTINUED | OUTPATIENT
Start: 2023-02-01 | End: 2023-02-10 | Stop reason: HOSPADM

## 2023-01-31 RX ORDER — ONDANSETRON 4 MG/1
4 TABLET, ORALLY DISINTEGRATING ORAL 4 TIMES DAILY PRN
Status: DISCONTINUED | OUTPATIENT
Start: 2023-01-31 | End: 2023-02-10 | Stop reason: HOSPADM

## 2023-01-31 RX ORDER — POLYETHYLENE GLYCOL 3350 17 G/17G
1 POWDER, FOR SOLUTION ORAL
Status: DISCONTINUED | OUTPATIENT
Start: 2023-01-31 | End: 2023-02-10 | Stop reason: HOSPADM

## 2023-01-31 RX ORDER — ONDANSETRON 2 MG/ML
4 INJECTION INTRAMUSCULAR; INTRAVENOUS 4 TIMES DAILY PRN
Status: DISCONTINUED | OUTPATIENT
Start: 2023-01-31 | End: 2023-02-10 | Stop reason: HOSPADM

## 2023-01-31 RX ORDER — ZIPRASIDONE HYDROCHLORIDE 40 MG/1
40 CAPSULE ORAL
Status: DISCONTINUED | OUTPATIENT
Start: 2023-01-31 | End: 2023-02-10 | Stop reason: HOSPADM

## 2023-01-31 RX ORDER — BISACODYL 10 MG
10 SUPPOSITORY, RECTAL RECTAL
Status: DISCONTINUED | OUTPATIENT
Start: 2023-01-31 | End: 2023-02-10 | Stop reason: HOSPADM

## 2023-01-31 RX ORDER — TRAZODONE HYDROCHLORIDE 50 MG/1
50 TABLET ORAL
Status: DISCONTINUED | OUTPATIENT
Start: 2023-01-31 | End: 2023-02-10 | Stop reason: HOSPADM

## 2023-01-31 RX ORDER — OXYCODONE HYDROCHLORIDE 5 MG/1
5 TABLET ORAL
Status: DISCONTINUED | OUTPATIENT
Start: 2023-01-31 | End: 2023-01-31

## 2023-01-31 RX ORDER — TRAZODONE HYDROCHLORIDE 50 MG/1
100 TABLET ORAL
Status: CANCELLED | OUTPATIENT
Start: 2023-01-31

## 2023-01-31 RX ORDER — ZIPRASIDONE HYDROCHLORIDE 40 MG/1
40 CAPSULE ORAL
Status: CANCELLED | OUTPATIENT
Start: 2023-01-31

## 2023-01-31 RX ORDER — ACETAMINOPHEN 325 MG/1
650 TABLET ORAL EVERY 6 HOURS PRN
Status: CANCELLED | OUTPATIENT
Start: 2023-01-31

## 2023-01-31 RX ORDER — TRAZODONE HYDROCHLORIDE 100 MG/1
100 TABLET ORAL
Status: DISCONTINUED | OUTPATIENT
Start: 2023-01-31 | End: 2023-02-10 | Stop reason: HOSPADM

## 2023-01-31 RX ORDER — ENOXAPARIN SODIUM 100 MG/ML
40 INJECTION SUBCUTANEOUS DAILY
Status: CANCELLED | OUTPATIENT
Start: 2023-01-31

## 2023-01-31 RX ORDER — ALBUTEROL SULFATE 90 UG/1
2 AEROSOL, METERED RESPIRATORY (INHALATION) EVERY 6 HOURS PRN
Status: CANCELLED | OUTPATIENT
Start: 2023-01-31

## 2023-01-31 RX ORDER — CHOLECALCIFEROL (VITAMIN D3) 125 MCG
10 CAPSULE ORAL
Status: CANCELLED | OUTPATIENT
Start: 2023-01-31

## 2023-01-31 RX ORDER — ALUMINA, MAGNESIA, AND SIMETHICONE 2400; 2400; 240 MG/30ML; MG/30ML; MG/30ML
20 SUSPENSION ORAL
Status: DISCONTINUED | OUTPATIENT
Start: 2023-01-31 | End: 2023-02-10 | Stop reason: HOSPADM

## 2023-01-31 RX ORDER — IPRATROPIUM BROMIDE AND ALBUTEROL SULFATE 2.5; .5 MG/3ML; MG/3ML
3 SOLUTION RESPIRATORY (INHALATION) EVERY 6 HOURS PRN
Status: CANCELLED | OUTPATIENT
Start: 2023-01-31

## 2023-01-31 RX ORDER — METOPROLOL SUCCINATE 25 MG/1
25 TABLET, EXTENDED RELEASE ORAL EVERY MORNING
Status: DISCONTINUED | OUTPATIENT
Start: 2023-02-01 | End: 2023-02-10 | Stop reason: HOSPADM

## 2023-01-31 RX ORDER — LANOLIN ALCOHOL/MO/W.PET/CERES
10 CREAM (GRAM) TOPICAL
Status: DISCONTINUED | OUTPATIENT
Start: 2023-01-31 | End: 2023-02-10 | Stop reason: HOSPADM

## 2023-01-31 RX ORDER — ECHINACEA PURPUREA EXTRACT 125 MG
2 TABLET ORAL PRN
Status: DISCONTINUED | OUTPATIENT
Start: 2023-01-31 | End: 2023-02-10 | Stop reason: HOSPADM

## 2023-01-31 RX ORDER — ENOXAPARIN SODIUM 100 MG/ML
40 INJECTION SUBCUTANEOUS DAILY
Qty: 27 EACH | Status: ON HOLD | DISCHARGE
Start: 2023-01-31 | End: 2023-02-09

## 2023-01-31 RX ORDER — HYDRALAZINE HYDROCHLORIDE 25 MG/1
25 TABLET, FILM COATED ORAL EVERY 8 HOURS PRN
Status: DISCONTINUED | OUTPATIENT
Start: 2023-01-31 | End: 2023-02-10 | Stop reason: HOSPADM

## 2023-01-31 RX ORDER — TRAMADOL HYDROCHLORIDE 50 MG/1
50 TABLET ORAL EVERY 4 HOURS PRN
Status: DISCONTINUED | OUTPATIENT
Start: 2023-01-31 | End: 2023-02-10 | Stop reason: HOSPADM

## 2023-01-31 RX ORDER — OXYCODONE HYDROCHLORIDE 10 MG/1
10 TABLET ORAL
Status: DISCONTINUED | OUTPATIENT
Start: 2023-01-31 | End: 2023-01-31

## 2023-01-31 RX ORDER — GABAPENTIN 100 MG/1
100 CAPSULE ORAL 3 TIMES DAILY
Qty: 90 CAPSULE | Status: ON HOLD
Start: 2023-01-31 | End: 2023-02-09 | Stop reason: SDUPTHER

## 2023-01-31 RX ORDER — METOPROLOL SUCCINATE 25 MG/1
25 TABLET, EXTENDED RELEASE ORAL EVERY MORNING
Status: CANCELLED | OUTPATIENT
Start: 2023-02-01

## 2023-01-31 RX ORDER — ALBUTEROL SULFATE 90 UG/1
2 AEROSOL, METERED RESPIRATORY (INHALATION) EVERY 6 HOURS PRN
Status: DISCONTINUED | OUTPATIENT
Start: 2023-01-31 | End: 2023-02-10 | Stop reason: HOSPADM

## 2023-01-31 RX ORDER — ENOXAPARIN SODIUM 100 MG/ML
40 INJECTION SUBCUTANEOUS DAILY
Status: DISCONTINUED | OUTPATIENT
Start: 2023-01-31 | End: 2023-02-10 | Stop reason: HOSPADM

## 2023-01-31 RX ORDER — ACETAMINOPHEN 325 MG/1
650 TABLET ORAL EVERY 4 HOURS PRN
Status: DISCONTINUED | OUTPATIENT
Start: 2023-01-31 | End: 2023-02-10 | Stop reason: HOSPADM

## 2023-01-31 RX ORDER — METHOCARBAMOL 500 MG/1
500 TABLET, FILM COATED ORAL 3 TIMES DAILY
Qty: 120 TABLET | Status: ON HOLD
Start: 2023-01-31 | End: 2023-02-09 | Stop reason: SDUPTHER

## 2023-01-31 RX ORDER — METHOCARBAMOL 500 MG/1
500 TABLET, FILM COATED ORAL 3 TIMES DAILY
Status: DISCONTINUED | OUTPATIENT
Start: 2023-01-31 | End: 2023-02-10 | Stop reason: HOSPADM

## 2023-01-31 RX ORDER — ACETAMINOPHEN 325 MG/1
650 TABLET ORAL EVERY 6 HOURS PRN
Status: DISCONTINUED | OUTPATIENT
Start: 2023-01-31 | End: 2023-02-10 | Stop reason: HOSPADM

## 2023-01-31 RX ORDER — HYDROMORPHONE HYDROCHLORIDE 2 MG/1
2 TABLET ORAL
Status: DISCONTINUED | OUTPATIENT
Start: 2023-01-31 | End: 2023-02-10 | Stop reason: HOSPADM

## 2023-01-31 RX ORDER — AMOXICILLIN 250 MG
2 CAPSULE ORAL 2 TIMES DAILY
Status: DISCONTINUED | OUTPATIENT
Start: 2023-01-31 | End: 2023-02-10 | Stop reason: HOSPADM

## 2023-01-31 RX ORDER — GABAPENTIN 100 MG/1
100 CAPSULE ORAL 3 TIMES DAILY
Status: DISCONTINUED | OUTPATIENT
Start: 2023-01-31 | End: 2023-02-01

## 2023-01-31 RX ORDER — IPRATROPIUM BROMIDE AND ALBUTEROL SULFATE 2.5; .5 MG/3ML; MG/3ML
3 SOLUTION RESPIRATORY (INHALATION) EVERY 6 HOURS PRN
Status: DISCONTINUED | OUTPATIENT
Start: 2023-01-31 | End: 2023-02-10 | Stop reason: HOSPADM

## 2023-01-31 RX ORDER — GABAPENTIN 100 MG/1
100 CAPSULE ORAL 3 TIMES DAILY
Status: CANCELLED | OUTPATIENT
Start: 2023-01-31

## 2023-01-31 RX ADMIN — OXYCODONE HYDROCHLORIDE 10 MG: 10 TABLET ORAL at 20:55

## 2023-01-31 RX ADMIN — IVABRADINE 7.5 MG: 7.5 TABLET, FILM COATED ORAL at 07:49

## 2023-01-31 RX ADMIN — GABAPENTIN 100 MG: 100 CAPSULE ORAL at 04:38

## 2023-01-31 RX ADMIN — GABAPENTIN 100 MG: 100 CAPSULE ORAL at 11:29

## 2023-01-31 RX ADMIN — ENOXAPARIN SODIUM 40 MG: 40 INJECTION SUBCUTANEOUS at 17:31

## 2023-01-31 RX ADMIN — GABAPENTIN 100 MG: 100 CAPSULE ORAL at 15:15

## 2023-01-31 RX ADMIN — SENNOSIDES AND DOCUSATE SODIUM 2 TABLET: 50; 8.6 TABLET ORAL at 20:56

## 2023-01-31 RX ADMIN — HYDROMORPHONE HYDROCHLORIDE 2 MG: 2 TABLET ORAL at 15:19

## 2023-01-31 RX ADMIN — Medication 10.5 MG: at 20:55

## 2023-01-31 RX ADMIN — METHOCARBAMOL 500 MG: 500 TABLET ORAL at 20:56

## 2023-01-31 RX ADMIN — ZIPRASIDONE HYDROCHLORIDE 40 MG: 40 CAPSULE ORAL at 20:55

## 2023-01-31 RX ADMIN — METHOCARBAMOL 500 MG: 500 TABLET ORAL at 04:38

## 2023-01-31 RX ADMIN — SENNOSIDES AND DOCUSATE SODIUM 2 TABLET: 50; 8.6 TABLET ORAL at 04:38

## 2023-01-31 RX ADMIN — OXYCODONE HYDROCHLORIDE 5 MG: 5 TABLET ORAL at 06:24

## 2023-01-31 RX ADMIN — OXYCODONE HYDROCHLORIDE 5 MG: 5 TABLET ORAL at 11:31

## 2023-01-31 RX ADMIN — GABAPENTIN 100 MG: 100 CAPSULE ORAL at 20:56

## 2023-01-31 RX ADMIN — METHOCARBAMOL 500 MG: 500 TABLET ORAL at 11:29

## 2023-01-31 RX ADMIN — METHOCARBAMOL 500 MG: 500 TABLET ORAL at 15:15

## 2023-01-31 RX ADMIN — TRAZODONE HYDROCHLORIDE 100 MG: 100 TABLET ORAL at 20:55

## 2023-01-31 SDOH — ECONOMIC STABILITY: TRANSPORTATION INSECURITY
IN THE PAST 12 MONTHS, HAS LACK OF RELIABLE TRANSPORTATION KEPT YOU FROM MEDICAL APPOINTMENTS, MEETINGS, WORK OR FROM GETTING THINGS NEEDED FOR DAILY LIVING?: NO

## 2023-01-31 ASSESSMENT — GAIT ASSESSMENTS
DEVIATION: ANTALGIC;STEP TO;BRADYKINETIC
DISTANCE (FEET): 50
ASSISTIVE DEVICE: FRONT WHEEL WALKER
GAIT LEVEL OF ASSIST: CONTACT GUARD ASSIST

## 2023-01-31 ASSESSMENT — PATIENT HEALTH QUESTIONNAIRE - PHQ9
2. FEELING DOWN, DEPRESSED, IRRITABLE, OR HOPELESS: NOT AT ALL
2. FEELING DOWN, DEPRESSED, IRRITABLE, OR HOPELESS: NOT AT ALL
SUM OF ALL RESPONSES TO PHQ9 QUESTIONS 1 AND 2: 0
1. LITTLE INTEREST OR PLEASURE IN DOING THINGS: NOT AT ALL
SUM OF ALL RESPONSES TO PHQ9 QUESTIONS 1 AND 2: 0
SUM OF ALL RESPONSES TO PHQ9 QUESTIONS 1 AND 2: 0
1. LITTLE INTEREST OR PLEASURE IN DOING THINGS: NOT AT ALL
1. LITTLE INTEREST OR PLEASURE IN DOING THINGS: NOT AT ALL
2. FEELING DOWN, DEPRESSED, IRRITABLE, OR HOPELESS: NOT AT ALL

## 2023-01-31 ASSESSMENT — LIFESTYLE VARIABLES
EVER FELT BAD OR GUILTY ABOUT YOUR DRINKING: NO
TOTAL SCORE: 0
EVER HAD A DRINK FIRST THING IN THE MORNING TO STEADY YOUR NERVES TO GET RID OF A HANGOVER: NO
TOTAL SCORE: 0
AVERAGE NUMBER OF DAYS PER WEEK YOU HAVE A DRINK CONTAINING ALCOHOL: 0
ON A TYPICAL DAY WHEN YOU DRINK ALCOHOL HOW MANY DRINKS DO YOU HAVE: 0
HAVE PEOPLE ANNOYED YOU BY CRITICIZING YOUR DRINKING: NO
HAVE YOU EVER FELT YOU SHOULD CUT DOWN ON YOUR DRINKING: NO
CONSUMPTION TOTAL: NEGATIVE
HOW MANY TIMES IN THE PAST YEAR HAVE YOU HAD 5 OR MORE DRINKS IN A DAY: 0
TOTAL SCORE: 0
ALCOHOL_USE: NO

## 2023-01-31 ASSESSMENT — COGNITIVE AND FUNCTIONAL STATUS - GENERAL
MOVING FROM LYING ON BACK TO SITTING ON SIDE OF FLAT BED: A LITTLE
TURNING FROM BACK TO SIDE WHILE IN FLAT BAD: A LITTLE
SUGGESTED CMS G CODE MODIFIER MOBILITY: CK
STANDING UP FROM CHAIR USING ARMS: A LITTLE
WALKING IN HOSPITAL ROOM: A LITTLE
MOVING TO AND FROM BED TO CHAIR: A LITTLE
MOBILITY SCORE: 17
CLIMB 3 TO 5 STEPS WITH RAILING: A LOT

## 2023-01-31 ASSESSMENT — PAIN DESCRIPTION - PAIN TYPE
TYPE: SURGICAL PAIN
TYPE: ACUTE PAIN
TYPE: SURGICAL PAIN
TYPE: ACUTE PAIN
TYPE: ACUTE PAIN

## 2023-01-31 ASSESSMENT — FIBROSIS 4 INDEX: FIB4 SCORE: 1.51

## 2023-01-31 NOTE — DISCHARGE SUMMARY
Discharge Summary    CHIEF COMPLAINT ON ADMISSION  Chief Complaint   Patient presents with    Hip Injury     L hip pain w/ L hip Fx, GLF on 1/24 onto L Hip. Pt states she was sent in by [facility] for further evaluation.        Reason for Admission  Sent by MD    Admission Date  1/27/2023     CODE STATUS  Full Code    HPI & HOSPITAL COURSE  This is a 32-year-old female with a past medical history significant for asthma, hypothyroidism,  POTS syndrome,sleep apnea, type 2 diabetes mellitus presents the ER on 1/24/2023 she had a ground-level fall 3 days ago.    Patient reported she landed on her left hip reported her pain was so severe that she was not able to bear weight on her left leg.  Initially she was evaluated at [place] on 1/25 where she had negative x-rays. CT scan did not show any evidence of fracture.    She then went to see orthopedic physician was fracture on her CT and recommended her to come to ER; orthopedic surgery was consulted, patient underwent Percutaneous fixation of left femoral neck fracture 1/28/2023 by Dr Abdul; per orthopedic surgery, patient can bear weight as tolerated.  PT/OT has evaluate the patient medical and postacute.     Data has evaluate the patient, stated patient is a good candidate for acute rehab    Therefore, she is discharged in fair and stable condition to an inpatient rehabilitation hospital.    The patient met 2-midnight criteria for an inpatient stay at the time of discharge.      FOLLOW UP ITEMS POST DISCHARGE  Torres Brody M.D.  Please follow up with Dr Noman Abdul     DISCHARGE DIAGNOSES  Principal Problem:    Hip fracture, left, closed, initial encounter ([facility]) POA: Yes  Active Problems:    Morbidly obese ([facility]) POA: Yes      Overview: IMO load March 2020    Schizophrenia ([facility]) POA: Yes    TONYA (obstructive sleep apnea) POA: Yes    Moderate asthma POA: Unknown    Postural orthostatic tachycardia syndrome POA: Unknown  Resolved Problems:    * No resolved hospital  problems. *      FOLLOW UP  Future Appointments   Date Time Provider Department Center   2/9/2023  8:00 AM John Leon M.D. [facility] None     No follow-up provider specified.    MEDICATIONS ON DISCHARGE     Medication List        START taking these medications        Instructions   enoxaparin 40 MG/0.4ML Sosy inj  Commonly known as: Lovenox   Inject 40 mg under the skin every day at 6 PM.  Dose: 40 mg     gabapentin 100 MG Caps  Commonly known as: NEURONTIN   Take 1 Capsule by mouth 3 times a day.  Dose: 100 mg     methocarbamol 500 MG Tabs  Commonly known as: ROBAXIN   Take 1 Tablet by mouth 3 times a day.  Dose: 500 mg            CONTINUE taking these medications        Instructions   acetaminophen/caffeine/butalbital 300-40-50 mg -40 MG Caps capsule  Commonly known as: FIORICET   Take 1 Capsule by mouth every 6 hours as needed. Indications: Tension Headache  Dose: 1 Capsule     albuterol 108 (90 Base) MCG/ACT Aers inhalation aerosol   Inhale 2 Puffs every 6 hours as needed for Shortness of Breath.  Dose: 2 Puff     Corlanor 7.5 MG Tabs tablet  Generic drug: ivabradine   Take 1 Tablet by mouth 2 times a day with meals.  Dose: 7.5 mg     diphenhydrAMINE 25 MG capsule  Commonly known as: BENADRYL   Take 50 mg by mouth at bedtime.  Dose: 50 mg     docusate sodium 250 MG capsule  Commonly known as: COLACE   Take 250 mg by mouth 2 times a day.  Dose: 250 mg     etonogestrel 68 MG Impl implant  Commonly known as: NEXPLANON   Inject 1 Each under the skin see administration instructions. Pt reports she is not sure when she had this medications injected last, pt reports she still has it in her arm  Every 3 years to change  Dose: 68 mg     fluconazole 150 MG tablet  Commonly known as: DIFLUCAN   Take 150 mg by mouth every Wednesday.  Dose: 150 mg     HYDROcodone-acetaminophen 5-325 MG Tabs per tablet  Commonly known as: NORCO   Take 1 Tablet by mouth every 6 hours as needed.  Dose: 1 Tablet      ipratropium-albuterol 0.5-2.5 (3) MG/3ML nebulizer solution  Commonly known as: DUONEB   Take 3 mL by nebulization every 6 hours as needed for Shortness of Breath.  Dose: 3 mL     lactulose 20 GM/30ML Soln   Take 40 mg by mouth 2 times a day.  Dose: 40 mg     Melatonin 10 MG Tabs   Take 10 mg by mouth at bedtime.  Dose: 10 mg     metoprolol SR 25 MG Tb24  Commonly known as: TOPROL XL   Take 25 mg by mouth every morning.  Dose: 25 mg     polyethylene glycol 3350 17 GM/SCOOP Powd  Commonly known as: MIRALAX   Take 17 g by mouth 1 time a day as needed.  Dose: 17 g     SUMAtriptan 50 MG Tabs  Commonly known as: IMITREX   Take 50 mg by mouth one time as needed.  Dose: 50 mg     traZODone 100 MG Tabs  Commonly known as: DESYREL   Take 100 mg by mouth at bedtime.  Dose: 100 mg     ziprasidone 40 MG Caps  Commonly known as: GEODON   Take 1 Capsule by mouth at bedtime.  Dose: 40 mg              Allergies  Allergies   Allergen Reactions    Cefdinir Shortness of Breath and Itching     Tolerated 1/18/17  Tolerates ceftriaxone  Tolerated augmentin 8/2019     Depakote [Divalproex Sodium] Unspecified     Muscle spasms/muscle pain and weakness      Doxycycline Anaphylaxis and Vomiting     Other reaction(s): pustules/blisters  Other reaction(s): stomach pain    Montelukast [Singulair] Unspecified     Cardiac effusion    Vancomycin Itching     Pt becomes flushed in face and chest.   RXN=7/10/16    Amitriptyline Unspecified     Headaches      Aripiprazole [Abilify] Unspecified     Headaches/muscle twitching      Clindamycin Nausea         Other reaction(s): nausea stomach pain    Flomax [Tamsulosin Hydrochloride] Swelling    Levaquin Unspecified     Severe muscle cramps in legs  RXN=unknown  Other reaction(s): leg muscle cramps    Metformin Unspecified     Increased lactic acid     Other reaction(s): itching and rash/nausea vomiting    Tamsulosin Swelling     Swelling of legs    Tape Rash     Tears skin off  coban with Tegaderm tape  "ok intermittently  RXN=ongoing    Wound Dressing Adhesive Hives     By pt report    Amoxicillin Rash    Depakote [Divalproex Sodium]     Erythromycin Rash     .  Other reaction(s): nausea stomach pain    Hydromorphone      Other reaction(s): vomiting    Ampicillin Rash     Pt reports that she received a rash     Ciprofloxacin Rash          Keflex Rash     Pt states she gets a rash with this medication  Tolerates ceftriaxone  Can take with Benadryl    Levofloxacin Unspecified     Leg muscle cramps    Metronidazole Rash     \"Vision problems\"  Other reaction(s): vision problems    Penicillins Hives     Can take with Benadryl    Sulfa Drugs Itching and Myalgia     Muscle pain and weakness  Other reaction(s): unknown    Valproic Acid Rash     .       DIET  Orders Placed This Encounter   Procedures    Diet Order Diet: Regular     Standing Status:   Standing     Number of Occurrences:   1     Order Specific Question:   Diet:     Answer:   Regular [1]       ACTIVITY  As tolerated.  Weight bearing as tolerated    LINES, DRAINS, AND WOUNDS  This is an automated list. Peripheral IVs will be removed prior to discharge.  Peripheral IV 01/27/23 20 G Left Forearm (Active)   Site Assessment Clean;Dry;Intact 01/31/23 0700   Dressing Type Transparent 01/31/23 0700   Line Status Saline locked 01/31/23 0700   Dressing Status Clean;Dry;Intact 01/31/23 0700   Dressing Intervention N/A 01/31/23 0700   Dressing Change Due 02/03/23 01/28/23 0800   Date Primary Tubing Changed 01/28/23 01/28/23 0959   Date IV Connector(s) Changed 01/27/23 01/28/23 0800   Infiltration Grading ([facility]) 0 01/31/23 0700   Phlebitis Scale ([facility]) 0 01/31/23 0700       Wound 03/10/21 Incision Back (Active)       Wound 02/14/22 Incision Abdomen dermabond, 2x2, tegaderm (Active)       Wound 01/28/23 Incision Hip Left xeroform, 4x4, tegaderm (Active)   Site Assessment JODI 01/31/23 0700   Periwound Assessment JODI 01/31/23 0700   Margins JODI 01/31/23 0700 "   Closure JODI 01/30/23 2000   Drainage Amount None 01/31/23 0700   Dressing Options Dry Gauze;Transparent Film 01/31/23 0700   Dressing Changed Observed 01/30/23 2000   Dressing Status Clean;Dry;Intact 01/31/23 0700       Peripheral IV 01/27/23 20 G Left Forearm (Active)   Site Assessment Clean;Dry;Intact 01/31/23 0700   Dressing Type Transparent 01/31/23 0700   Line Status Saline locked 01/31/23 0700   Dressing Status Clean;Dry;Intact 01/31/23 0700   Dressing Intervention N/A 01/31/23 0700   Dressing Change Due 02/03/23 01/28/23 0800   Date Primary Tubing Changed 01/28/23 01/28/23 0959   Date IV Connector(s) Changed 01/27/23 01/28/23 0800   Infiltration Grading ([facility]) 0 01/31/23 0700   Phlebitis Scale ([facility] Only) 0 01/31/23 0700               MENTAL STATUS ON TRANSFER           Alert and orineted x 4  CONSULTATIONS  ortho    PROCEDURES  Percutaneous fixation of left femoral neck fracture    LABORATORY  Lab Results   Component Value Date    SODIUM 136 01/29/2023    POTASSIUM 3.9 01/29/2023    CHLORIDE 106 01/29/2023    CO2 20 01/29/2023    GLUCOSE 167 (H) 01/29/2023    BUN 9 01/29/2023    CREATININE 0.54 01/29/2023    CREATININE 0.75 (L) 07/20/2010    GLOMRATE >59 07/20/2010        Lab Results   Component Value Date    WBC 6.3 01/29/2023    WBC 6.1 07/20/2010    HEMOGLOBIN 12.5 01/29/2023    HEMATOCRIT 36.5 (L) 01/29/2023    PLATELETCT 154 (L) 01/29/2023        Total time of the discharge process exceeds 38 minutes.

## 2023-01-31 NOTE — PROGRESS NOTES
"   Orthopaedic Progress Note    Interval changes:  Patient doing well   Dressings CDI  Cleared for DC home by ortho pending medicine clearance    ROS - Patient denies any new issues.  Pain well controlled.    BP 91/50   Pulse 69   Temp 36.8 °C (98.2 °F) (Temporal)   Resp 18   Ht 1.626 m (5' 4\")   Wt 105 kg (231 lb 7.7 oz)   SpO2 93%       Patient seen and examined  No acute distress  Breathing non labored  RRR  LLE surgical dressing is clean, dry, and intact. Patient clearly fires tibialis anterior, EHL, and gastrocnemius/soleus. Sensation is intact to light touch throughout superficial peroneal, deep peroneal, tibial, saphenous, and sural nerve distributions. Strong and palpable 2+ dorsalis pedis and posterior tibial pulses with capillary refill less than 2 seconds. No lower leg tenderness or discomfort.      Recent Labs     01/28/23  0442 01/29/23  0533   WBC 5.0 6.3   RBC 4.21 4.04*   HEMOGLOBIN 13.0 12.5   HEMATOCRIT 37.9 36.5*   MCV 90.0 90.3   MCH 30.9 30.9   MCHC 34.3 34.2   RDW 40.5 39.6   PLATELETCT 146* 154*   MPV 11.9 12.0       Active Hospital Problems    Diagnosis     TONYA (obstructive sleep apnea) [G47.33]      Priority: Medium    Schizophrenia ([facility]) [F20.9]      Priority: Low    Morbidly obese ([facility]) [E66.01]      Priority: Low     IMO load March 2020      Postural orthostatic tachycardia syndrome [G90.A]     Hip fracture, left, closed, initial encounter ([facility]) [S72.002A]     Moderate asthma [J45.909]        Assessment/Plan:  Patient doing well    Dressings CDI  Cleared for DC home by ortho pending medicine clearance  POD#2 S/P Percutaneous fixation of left femoral neck fracture  Wt bearing status - WBAT BLE  Wound care/Drains - Dressings to be changed every other day by nursing  Future Procedures - none planned   Lovenox: Start 1/29, Duration-until ambulatory > 150'  Sutures/Staples out- 14 days post operatively  PT/OT-initiated  Antibiotics: Perioperative completed  DVT Prophylaxis- " TEDS/SCDs/Foot pumps  Andrade-none needed  Case Coordination for Discharge Planning - Disposition home

## 2023-01-31 NOTE — DISCHARGE PLANNING
Case Management Discharge Planning    Admission Date: 1/27/2023  GMLOS: 4.4  ALOS: 4    6-Clicks ADL Score: 21  6-Clicks Mobility Score: 17  PT and/or OT Eval ordered: Yes  Post-acute Referrals Ordered: Yes  Post-acute Choice Obtained: Yes  Has referral(s) been sent to post-acute provider:  Yes      Anticipated Discharge Dispo:      DME Needed: No    Action(s) Taken: Updated Provider/Nurse on Discharge Plan    Escalations Completed: None    Medically Clear: Yes    Next Steps: Transfer to Rehab today at [phone].    Barriers to Discharge: None    Is the patient up for discharge tomorrow: No, Rehab today

## 2023-01-31 NOTE — PROGRESS NOTES
"   Orthopaedic Progress Note    Interval changes:  Patient doing well   Dressings CDI  Cleared for DC home by ortho pending medicine clearance    ROS - Patient denies any new issues.  Pain well controlled.    /63   Pulse 73   Temp 36.6 °C (97.9 °F) (Temporal)   Resp 18   Ht 1.626 m (5' 4\")   Wt 105 kg (231 lb 7.7 oz)   SpO2 92%       Patient seen and examined  No acute distress  Breathing non labored  RRR  LLE surgical dressing is clean, dry, and intact. Patient clearly fires tibialis anterior, EHL, and gastrocnemius/soleus. Sensation is intact to light touch throughout superficial peroneal, deep peroneal, tibial, saphenous, and sural nerve distributions. Strong and palpable 2+ dorsalis pedis and posterior tibial pulses with capillary refill less than 2 seconds. No lower leg tenderness or discomfort.      Recent Labs     01/29/23  0533   WBC 6.3   RBC 4.04*   HEMOGLOBIN 12.5   HEMATOCRIT 36.5*   MCV 90.3   MCH 30.9   MCHC 34.2   RDW 39.6   PLATELETCT 154*   MPV 12.0       Active Hospital Problems    Diagnosis     TONYA (obstructive sleep apnea) [G47.33]      Priority: Medium    Schizophrenia ([facility]) [F20.9]      Priority: Low    Morbidly obese ([facility]) [E66.01]      Priority: Low     IMO load March 2020      Postural orthostatic tachycardia syndrome [G90.A]     Hip fracture, left, closed, initial encounter ([facility]) [S72.002A]     Moderate asthma [J45.909]        Assessment/Plan:  Patient doing well    Dressings CDI  Cleared for DC home by ortho pending medicine clearance  POD#3 S/P Percutaneous fixation of left femoral neck fracture  Wt bearing status - WBAT BLE  Wound care/Drains - Dressings to be changed every other day by nursing  Future Procedures - none planned   Lovenox: Start 1/29, Duration-until ambulatory > 150'  Sutures/Staples out- 14 days post operatively  PT/OT-initiated  Antibiotics: Perioperative completed  DVT Prophylaxis- TEDS/SCDs/Foot pumps  Andrade-none needed  Case Coordination for Discharge " Planning - Disposition home

## 2023-01-31 NOTE — DISCHARGE PLANNING
1002: Per attending patient is medically cleared, forwarded PAS to Dr Simmons to review.     1114: Patient has been accepted by Dr Simmons at [facility]. Transport set for 1300/1330 GMT, nurse report #[id number]. Notified care team.    1228: Met with patient at bedside to provide update. Discussed current visitor/Covid policy and expectations for IPR. Pt reported no additional needs at this time, TCC to remain available if further intervention is needed.

## 2023-01-31 NOTE — H&P
"  Physical Medicine & Rehabilitation  History and Physical (H&P)  &     Post Admission Physician Evaluation (KODI)       Date of Admission: 1/31/2023  Date of Service: 1/31/2023   Kristin Balderrama    [facility] Code to Support Admission: 0008.11 - Orthopaedic Disorders: Status Post Unilateral Hip Fracture  Etiologic Diagnosis: Hip fracture, left, closed, initial encounter (HCC)    _______________________________________________    Chief Complaint: Decreased mobility, left hip fracture    History of Present Illness:  Adapted from the PM&R Consult by Dr. Grissom:   The patient is a 32 y.o. right hand dominant female with a past medical history of asthma, Hashimoto's, sleep apnea, type 2 diabetes, pots syndrome and Annetta-Danlos syndrome;  who presented on 1/27/2023  4:51 PM with left hip pain after ground-level fall secondary to postural orthostatic tachycardia syndrome. Patient initially seen at outside ED but no noted fracture. She was then assessed at [facility] and found to have left femoral neck fracture. Patient was evaluated by orthopedic surgeon Dr. Noman Abdul MD who found left nondisplaced femoral neck fracture, and took patient to the OR on 1/28 for percutaneous fixation of left hip fracture. Post-operative course complicated by hyperglycemia, back pain, and low SBP.     Patient tolerated transfer to [facility]. She reports her pain is severe currently. She reports she is ok with PO dilaudid but not IV. She reports chronic back pain as well. Denies NVD. Denies SOB. Denies changes in sensation to left leg.     Review of Systems:     Comprehensive 14 point ROS was reviewed and all were negative except as noted elsewhere in this document.     Past Medical History:  Past Medical History:   Diagnosis Date    Abdominal pain     Anginal syndrome     random chest pain especially with tachycardia    Apnea, sleep     Arrhythmia     \"sinus tachycardia\", cariologist, Dr. Kumar; ablation 2/2016    Arthritis     osteo    ASTHMA " "    when around smoke    Back pain     Borderline personality disorder (HCC)     Breath shortness     with tachycardia    Bronchitis 02/08/2022    Last time was 12/21    Cardiac arrhythmia     Chickenpox     Chronic UTI 09/18/2010    Cough     Daytime sleepiness     Dental disorder 03/08/2021    infected tooth    Depression     Diabetes (HCC)     Diarrhea     incontinece    Disorder of thyroid     Hashimoto's    Fall     Fatigue     Frequent headaches     Gasping for breath     Gynecological disorder     PCOS    Headache(784.0)     Heart burn     Heart murmur     History of falling     Indigestion     Migraine     Mitochondrial disease (HCC)     Multiple personality disorder (HCC)     Nausea     Obesity     Other fatigue 06/29/2020    Pain 08/15/2012    back, 7/10    Painful joint     PCOS (polycystic ovarian syndrome)     Pneumonia 2012 12/2020    Psychosis (HCC)     Ringing in ears     Scoliosis     Shortness of breath     O2 as needed    Sinus tachycardia 10/31/2013    Sleep apnea     CPAP \"pulmonary doctor took me off mid year 2016\"    Snoring     Tonsillitis     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HCC)     2/8/22: Per pt: not anymore    Tuberculosis     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    Urinary bladder disorder     Suprapubic cath. 2/8/22: Not anymore.     Urinary incontinence     Weakness     Wears glasses        Past Surgical History:  Past Surgical History:   Procedure Laterality Date    PB PERCUT FIX PBOX/NECK FEMUR FX Left 1/28/2023    Procedure: FIXATION, HIP, USING CANNULATED SCREW;  Surgeon: Noman Abdul M.D.;  Location: SURGERY [facility];  Service: Orthopedics    [place] LAP,DIAGNOSTIC ABDOMEN  2/14/2022    Procedure: LAPAROSCOPY;  Surgeon: Seamus Pisano M.D.;  Location: SURGERY SAME DAY [facility];  Service: Gynecology    OVARIAN CYSTECTOMY Right 2/14/2022    Procedure: EXCISION, CYST, OVARY;  Surgeon: Seamus Pisano M.D.;  Location: SURGERY SAME DAY [facility];  Service: Gynecology    BOWEL " STIMULATOR INSERTION  3/10/2021    Procedure: INSERTION, ELECTRODE LEADS AND PULSE GENERATOR, NEUROSTIMULATOR, SACRAL - REMOVAL OF INTERSTIM WITH REPLACEMENT OF SACRAL NEUROMODULATION DEVICE;  Surgeon: Joe Noyola M.D.;  Location: SURGERY [facility];  Service: General    MUSCLE BIOPSY Right 1/26/2017    Procedure: MUSCLE BIOPSY - THIGH;  Surgeon: Isidro Vigil M.D.;  Location: SURGERY [facility];  Service:     GASTROSCOPY WITH BALLOON DILATATION N/A 1/4/2017    Procedure: GASTROSCOPY WITH DILATATION;  Surgeon: Torres Vargas M.D.;  Location: SURGERY [facility];  Service:     BOWEL STIMULATOR INSERTION  7/13/2016    Procedure: BOWEL STIMULATOR INSERTION FOR PERMANENT INTERSTIM SACRAL IMPLANT;  Surgeon: Joe Noyola M.D.;  Location: SURGERY [facility];  Service:     RECOVERY  1/27/2016    Procedure: CATH LAB EP STUDY WITH SINUS NODE MODIFICATION LA;  Surgeon: [facility];  [place] [facility]  Service:     OTHER CARDIAC SURGERY  1/2016    cardiac ablation    NEURO DEST FACET L/S W/IG SNGL  4/21/2015    Performed by Reza Tabor at SURGERY [facility]    LUMBAR FUSION ANTERIOR  8/21/2012    Performed by SUSIE SAWANT at SURGERY [facility] ORS    ALYSSA BY LAPAROSCOPY  8/29/2010    Performed by SHAYY JOHNS at SURGERY [facility] ORS    LAMINOTOMY      OTHER ABDOMINAL SURGERY      TONSILLECTOMY      tonsillectomy       Family History:  Family History   Problem Relation Age of Onset    Hypertension Mother     Sleep Apnea Mother     Heart Disease Mother     Other Mother         hypothryod    Hypertension Maternal Uncle     Heart Disease Maternal Grandmother     Hypertension Maternal Grandmother     No Known Problems Sister     Other Sister         Narcolepsy;fibromyalsia;bone;nerve    Genitourinary () Problems Sister         endometriosis       Medications:  Current Facility-Administered Medications   Medication Dose    Respiratory Therapy Consult       hydrALAZINE (APRESOLINE) tablet 25 mg  25 mg    acetaminophen (Tylenol) tablet 650 mg  650 mg    senna-docusate (PERICOLACE or SENOKOT S) 8.6-50 MG per tablet 2 Tablet  2 Tablet    And    polyethylene glycol/lytes (MIRALAX) PACKET 1 Packet  1 Packet    And    magnesium hydroxide (MILK OF MAGNESIA) suspension 30 mL  30 mL    And    bisacodyl (DULCOLAX) suppository 10 mg  10 mg    omeprazole (PRILOSEC) capsule 20 mg  20 mg    mag hydrox-al hydrox-simeth (MAALOX PLUS ES or MYLANTA DS) suspension 20 mL  20 mL    ondansetron (ZOFRAN ODT) dispertab 4 mg  4 mg    Or    ondansetron (ZOFRAN) syringe/vial injection 4 mg  4 mg    traZODone (DESYREL) tablet 50 mg  50 mg    sodium chloride (OCEAN) 0.65 % nasal spray 2 Spray  2 Spray    traMADol (Ultram) 50 MG tablet 50 mg  50 mg    acetaminophen (Tylenol) tablet 650 mg  650 mg    albuterol inhaler 2 Puff  2 Puff    enoxaparin (Lovenox) inj 40 mg  40 mg    gabapentin (NEURONTIN) capsule 100 mg  100 mg    ipratropium-albuterol (DUONEB) nebulizer solution  3 mL    ivabradine (Corlanor) tablet 7.5 mg  7.5 mg    melatonin tablet 10.5 mg  10.5 mg    methocarbamol (ROBAXIN) tablet 500 mg  500 mg    [START ON 2/1/2023] metoprolol SR (TOPROL XL) tablet 25 mg  25 mg    traZODone (DESYREL) tablet 100 mg  100 mg    ziprasidone (GEODON) capsule 40 mg  40 mg    oxyCODONE immediate release (ROXICODONE) tablet 10 mg  10 mg    HYDROmorphone (DILAUDID) tablet 2 mg  2 mg       Allergies:  Cefdinir, Depakote [divalproex sodium], Doxycycline, Montelukast [singulair], Vancomycin, Amitriptyline, Aripiprazole [abilify], Clindamycin, Flomax [tamsulosin hydrochloride], Levaquin, Metformin, Tamsulosin, Tape, Wound dressing adhesive, Amoxicillin, Depakote [divalproex sodium], Erythromycin, Hydromorphone, Ampicillin, Ciprofloxacin, Keflex, Levofloxacin, Metronidazole, Penicillins, Sulfa drugs, and Valproic acid    Psychosocial History:  Housing / Facility: 1 Story House  Steps Into Home:  (ramp)  Steps In  "Home: 12  Lives with - Patient's Self Care Capacity: Related Adult  Equipment Owned: Front-Wheel Walker, Single Point Cane, Wheelchair, Grab Bar(s) In Tub / Shower     Prior Level of Function / Living Situation:   Physical Therapy: Prior Services: Home-Independent  Housing / Facility: [address] [place] [address]  Steps Into Home:  (ramp)  Steps In Home: 12  Bathroom Set up: Walk In Shower, Grab Bars  Equipment Owned: Front-Wheel Walker, Single Point Cane, Wheelchair, Grab Bar(s) In Tub / Shower  Lives with - Patient's Self Care Capacity: Related Adult  Bed Mobility: Independent  Transfer Status: Independent  Ambulation: Independent  Distance Ambulation (Feet):  (short community distances)  Assistive Devices Used: Single Point Cane  Current Level of Function:   Gait Level Of Assist: Minimal Assist  Assistive Device: Front Wheel Walker  Distance (Feet): 5  Deviation: Antalgic, Bradykinetic  Weight Bearing Status: WBAT L LE  Supine to Sit: Supervised  Scooting: Moderate Assist  Comments: up to chair post session  Sit to Stand: Minimal Assist  Bed, Chair, Wheelchair Transfer: Minimal Assist  Sitting in Chair: post session  Sitting Edge of Bed: 10 min  Standin min  Occupational Therapy:   Self Feeding: Independent  Grooming / Hygiene: Independent  Bathing: Independent  Dressing: Independent  Toileting: Independent  Medication Management: Independent  Laundry: Independent  Kitchen Mobility: Independent  Finances: Independent  Home Management: Independent  Shopping: Independent  Prior Level Of Mobility: Independent With Device in Community  Prior Services: Home-Independent  Housing / Facility: [address] [place] [address]  Current Level of Function:   Upper Body Dressing: Supervision  Lower Body Dressing: Maximal Assist  Toileting:  (NT)    CURRENT LEVEL OF FUNCTION:   Same as level of function prior to admission to [facility]    Physical Examination:     VITAL SIGNS:   height is 1.626 m (5' 4\") and weight is 107 kg (235 lb " 14.3 oz). Her oral temperature is 36.6 °C (97.8 °F). Her blood pressure is 98/67 and her pulse is 70. Her respiration is 18 and oxygen saturation is 91%.    GENERAL: No apparent distress  HEENT: Normocephalic/atraumatic, EOMI, and PERRL  CARDIAC: Regular rate and rhythm, normal S1, S2   LUNGS: Clear to auscultation   ABDOMINAL: bowel sounds present, soft, and nontender    EXTREMITIES: no contractures, spasticity, or edema.  NEURO:  Mental status:  A&Ox4 (person, place, date, situation) answers questions appropriately  Speech: fluent, no aphasia or dysarthria  Motor:    5/5 BUE  5/5 RLE, 3/5 L HF, KE, otherwise 4+/5 LLE  Sensory: intact to light touch through out    Radiology:  XR Rib view  IMPRESSION:     Normal rib series.    XR Hip  Narrative  2 views of the left hip demonstrate a nondisplaced left femoral neck   fracture that is better seen on the CT scan in our PAC system.  Laboratory Values:  Recent Labs     01/29/23  0533   SODIUM 136   POTASSIUM 3.9   CHLORIDE 106   CO2 20   GLUCOSE 167*   BUN 9   CREATININE 0.54   CALCIUM 8.9     Recent Labs     01/29/23  0533   WBC 6.3   RBC 4.04*   HEMOGLOBIN 12.5   HEMATOCRIT 36.5*   MCV 90.3   MCH 30.9   MCHC 34.2   RDW 39.6   PLATELETCT 154*   MPV 12.0           Primary Rehabilitation Diagnosis:    This patient is a 32 y.o. female admitted for acute inpatient rehabilitation with   Hip fracture, left, closed, initial encounter ([facility]).    Impairments:   ADLs/IADLs  Mobility    Secondary Diagnosis/Medical Co-morbidities Affecting Function  DM2 with hyperglycemia  Asthma  POTS  Thrombocytopenia  Morbid obesity due to excess calories  Schizophrenia    Relevant Changes Since Preadmission Evaluation:    Status unchanged    The patient's rehabilitation potential is Good  The patient's medical prognosis is good    Rehabilitation Plan:   Discussion and Recommendations:   1. The patient requires an acute inpatient rehabilitation program with a coordinated program of care at an  intensity and frequency not available at a lower level of care. This recommendation is substantiated by the patient's medical physicians who recommend that the patient's intervention and assessment of medical issues needs to be done at an acute level of care for patient's safety and maximum outcome.   2. A coordinated program of care will be supplied by an interdisciplinary team of physical therapy, occupational therapy, rehab physician, rehab nursing, and, if needed, speech therapy and rehab psychology. Rehab team presents a patient-specific rehabilitation and education program concentrating on prevention of future problems related to accessibility, mobility, skin, bowel, bladder, sexuality, and psychosocial and medical/surgical problems.   3. Need for Rehabilitation Physician: The rehab physician will be evaluating the patient on a multi-weekly basis to help coordinate the program of care. The rehab physician communicates between medical physicians, therapists, and nurses to maximize the patient's potential outcome. Specific areas in which the rehab physician will be providing daily assessment include the following:   A. Assessing the patient's heart rate and blood pressure response (vitals monitoring) to activity and making adjustments in medications or conservative measures as needed.   B. The rehab physician will be assessing the frequency at which the program can be increased to allow the patient to reach optimal functional outcome.   C. The rehab physician will also provide assessments in daily skin care, especially in light of patient's impairments in mobility.   D. The rehab physician will provide special expertise in understanding how to work with functional impairment and recommend appropriate interventions, compensatory techniques, and education that will facilitate the patient's outcome.   4. Rehab R.N.   The rehab RN will be working with patient to carry over in room mobility and activities of daily  living when the patient is not in 3 hours of skilled therapy. Rehab nursing will be working in conjunction with rehab physician to address all the medical issues above and continue to assess laboratory work and discuss abnormalities with the treating physicians, assess vitals, and response to activity, and discuss and report abnormalities with the rehab physician. Rehab RN will also continue daily skin care, supervise bladder/bowel program, instruct in medication administration, and ensure patient safety.   5. Rehab Therapy: Therapies to treat at intensity and frequency of (may change after completion of evaluation by all therapeutic disciplines):       PT:  Physical therapy to address mobility, transfer, gait training and evaluation for adaptive equipment needs 1 and 1/2 hour/day at least 5 days/week for the duration of the ELOS (see below)       OT:  Occupational therapy to address ADLs, self-care, home management training, functional mobility/transfers and assistive device evaluation, and community re-integration 1 and /12 hour/day at least 5 days/week for the duration of the ELOS (see below).     Medical management / Rehabilitation Issues/ Adverse Potential as part of rehabilitation plan     Rehabilitation Issues/Adverse Potential  1.  Fall with left hip fracture - Patient s/p fall with hip fracture s/p IMN with Dr. Abdul on 1/28/23. Patient WBAT. Patient demonstrates functional deficits in strength, balance, coordination, and ADL's. Patient is admitted to [facility] for comprehensive rehabilitation therapy as described below.   Rehabilitation nursing monitors bowel and bladder control, educates on medication administration, co-morbidities and monitors patient safety.    2.  Neurostimulants: None at this time but continue to assess daily for need to initiate should status change.    3.  DVT prophylaxis:  Patient is on Lovenox for anticoagulation upon transfer. Encourage OOB. Monitor daily  for signs and symptoms of DVT including but not limited to swelling and pain to prevent the development of DVT that may interfere with therapies.    4.  GI prophylaxis:  On prilosec to prevent gastritis/dyspepsia which may interfere with therapies.    5.  Pain: No issues with pain currently / Controlled with APAP/Tramadol/Oxycodone    6.  Nutrition/Dysphagia: Dietician monitors nutrient intake, recommend supplements prn and provide nutrition education to pt/family to promote optimal nutrition for wound healing/recovery.     7.  Bladder/bowel:  Start bowel and bladder program as described below, to prevent constipation, urinary retention (which may lead to UTI), and urinary incontinence (which will impact upon pt's functional independence).   - Post void bladder scans, I&O cath for PVRs >400  - up to commode after meal     8.  Skin/dermal ulcer prophylaxis: Monitor for new skin conditions with q.2 h. turns as required to prevent the development of skin breakdown.     9.  Cognition/Behavior: As needed psychologist provides adjustment counseling to illness and psychosocial barriers that may be potential barriers to rehabilitation.     10. Respiratory therapy: RT performs O2 management prn, breathing retraining, pulmonary hygiene and bronchospasm management prn to optimize participation in therapies.     Medical Co-Morbidities/Adverse Potential Affecting Function:   Fall with left hip fracture - Patient s/p fall with hip fracture s/p IMN with Dr. Abdul on 1/28/23. Patient WBAT.  -PT and OT for mobility and ADLs. Per guidelines, 15 hours per week between PT, OT and SLP.  -Follow-up Dr. Abdul    DM2 with hyperglycemia - Not on any medication on transfer. Will check A1c    Asthma - Patient on PRN Duoneb and Albuterol inhaler     POTS - Patient on Metoprolol 25 mg XL and Corlanor 7.5 mg BID    Neuropathy - Patient on Gabapentin 100 mg TID    Thrombocytopenia - Check AM CBC    Morbid obesity due to excess calories - BMI  of 39.7 on admission, meets medical criteria. Dietitian to consult    Schizophrenia - Patient on Geodon 40 mg QHS and Trazodone     Pain - APAP/Gabapentin/Oxycodone and Robaxin TID    Skin - Patient at risk for skin breakdown due to debility in areas including sacrum, achilles, elbows and head in addition to other sites. Nursing to assess skin daily.     GI Ppx - Patient on Prilosec for GERD prophylaxis. Patient on Senna-docusate for constipation prophylaxis.     DVT Ppx - Patient Lovenox on transfer.    I personally performed a complete drug regimen review and no potential clinically significant medication issues were identified.     Goals/Expected Level of Function Based on Current Medical and Functional Status:  (may change based on patient's medical status and rate of impairment recovery)  Transfers:   Modified Independent  Mobility/Gait:   Modified Independent  ADL's:   Modified Independent    DISPOSITION: Discharge to pre-morbid independent living setting with the supportive care of patient's family.    ELOS: 10-14 days  ____________________________________    T. Dhruv Simmons MD./PhD.  [facility] - Physical Medicine & Rehabilitation   [facility] - Brain Injury Medicine   ____________________________________    Pt was seen today for 75 min, and entire time spent in face-to-face contact was >50% in counseling and coordination of care as detailed in A/P above.

## 2023-01-31 NOTE — CARE PLAN
The patient is Stable - Low risk of patient condition declining or worsening    Shift Goals  Clinical Goals: pain control, safety  Patient Goals: Pain control  Family Goals: n/a    Progress made toward(s) clinical / shift goals:    Problem: Knowledge Deficit - Standard  Goal: Patient and family/care givers will demonstrate understanding of plan of care, disease process/condition, diagnostic tests and medications  Outcome: Progressing  Note: Patient educated on plan of care, plan for discharge to rehab, verbalizes understanding, all questions answered. Pt encouraged to voice feelings or concerns.      Problem: Pain - Standard  Goal: Alleviation of pain or a reduction in pain to the patient’s comfort goal  1/31/2023 0048 by Nabor Pham R.N.  Outcome: Progressing  Note: Pt states pain is relieved with current pain medication regimen. Pt medicated per MAR. Pt provided ice packs and pillows to relieve pain.

## 2023-01-31 NOTE — IDT DISCHARGE PLANNING
CASE MANAGEMENT INITIAL ASSESSMENT    Admit Date:  1/31/2023     CM reviewed the medical chart and will meet with the patient to discuss role of case management / discharge planning / team conference.       Diagnosis: Hip fracture, left, closed, initial encounter ([facility]) [S72.002A]  S/p left hip fracture [Z87.81]    Co-morbidities:   Patient Active Problem List    Diagnosis Date Noted    S/p left hip fracture 01/31/2023    Postural orthostatic tachycardia syndrome 01/28/2023    Hip fracture, left, closed, initial encounter ([facility]) 01/27/2023    Moderate asthma 01/27/2023    Hypermobility syndrome 11/10/2022    Migraine, hemiplegic 07/27/2022    IBS (irritable bowel syndrome) 07/27/2022    Hemiplegic 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, not intractable 07/27/2022    Hidradenitis axillaris 07/10/2022    Vision changes 06/28/2022    Palpitations 05/13/2022    Chest pain, precordial 05/13/2022    Inappropriate sinus tachycardia 09/24/2021    Psychogenic disorder 06/24/2021    Diplopia 06/23/2021    Failure to thrive in adult 06/21/2021    Acute bilateral low back pain with bilateral sciatica 06/16/2021    Normal pressure hydrocephalus ([facility]) 06/04/2021    GIB (gastrointestinal bleeding) 05/24/2021    Asthma exacerbation 04/15/2021    Blood per rectum 04/15/2021    Bilateral hand pain 04/05/2021    Suprapubic catheter dysfunction ([facility]) 02/05/2021    UTI (urinary tract infection) 10/11/2020    Seizures ([facility]) 09/04/2020    Dry eyes 08/18/2020    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[facility]) 08/15/2020    Schizoaffective disorder, depressive type ([facility]) 03/02/2020    PTSD (post-traumatic stress disorder) 03/02/2020    Intracranial hypertension 02/27/2020    Mixed stress and urge urinary incontinence 12/27/2019    Mild intermittent asthma without complication 12/16/2019    Immunocompromised ([facility]) 11/06/2019    Metabolic acidosis 08/30/2019    Moderate persistent asthma with acute exacerbation 08/14/2019    Optic neuritis 05/27/2019    SVT  (supraventricular tachycardia) ([facility]) 04/10/2019    Muscular deconditioning 07/14/2018    TONYA (obstructive sleep apnea) 01/09/2018    Functional diarrhea 01/05/2018    Depression 10/28/2016    Schizophrenia ([facility]) 10/27/2016    Full incontinence of feces 07/13/2016    Polypharmacy 06/27/2016    Vitamin D deficiency 05/21/2016    Lactic acidosis 04/19/2016    Morbidly obese ([facility]) 03/07/2016    Scoliosis 03/07/2016    GERD (gastroesophageal reflux disease) 03/07/2016    Peripheral neuropathy and chronic pain syndrome ([facility]) 03/06/2016    Fatty liver disease, nonalcoholic 01/19/2015    Anxiety 12/16/2014    Hyperglycemia 09/05/2014    Urinary retention 04/02/2011    Dissociative identity disorder ([facility]) 04/02/2011    Borderline personality disorder in adult ([facility]) 09/18/2010    AP (abdominal pain) 09/18/2010     Prior Living Situation:  With relative.    Prior Level of Function:   Independent    Support Systems:  Primary : Vivienne Mclean         Previous Services Utilized:        Other Information:        Primary Payor Source: Medicare A, Medicare B  Primary Care Practitioner : Torres Brody    Patient / Family Goal:       Plan:  1. Continue to follow patient through hospitalization and provide discharge planning in collaboration with patient, family, physicians and ancillary services.     2. Utilize community resources to ensure a safe discharge.

## 2023-01-31 NOTE — THERAPY
"Physical Therapy   Daily Treatment     Patient Name: Kristin Balderrama  Age:  32 y.o., Sex:  female  Medical Record #: [id number]  Today's Date: 1/31/2023     Precautions  Precautions: Fall Risk;Weight Bearing As Tolerated Left Lower Extremity  Comments: EDS and POTS    Assessment    Patient seen for follow up PT treatment session and demos improved balance, strength and activity tolerance. Patient able to ambulate in the hallway with Angelita and step-to gait limited by pain.  She will benefit from placement for further therapy at this time.     Plan    Physical Therapy Treatment Plan  Physical Therapy Treatment Plan: Continue Current Treatment Plan    DC Equipment Recommendations: Unable to determine at this time  Discharge Recommendations: Recommend post-acute placement for additional physical therapy services prior to discharge home      Subjective    \"I'm going to rehab today\"     Objective       01/31/23 1131   Precautions   Precautions Fall Risk;Weight Bearing As Tolerated Left Lower Extremity   Comments EDS and POTS   Pain 0 - 10 Group   Location Hip   Location Orientation Left   Pain Rating Scale (NPRS) 8   Description Aching   Cognition    Cognition / Consciousness WDL   Level of Consciousness Alert   Comments pleasant and cooperative   Sitting Lower Body Exercises   Sitting Lower Body Exercises Yes   Ankle Pumps 2 sets of 10   Long Arc Quad 2 sets of 10   Comments pre-gait warm-up   Neuro-Muscular Treatments   Neuro-Muscular Treatments Compensatory Strategies;Weight Shift Right;Weight Shift Left;Verbal Cuing   Other Treatments   Other Treatments Provided discussed expectations of AR and progression of gait mechanics   Balance   Sitting Balance (Static) Fair   Sitting Balance (Dynamic) Fair   Standing Balance (Static) Fair -   Standing Balance (Dynamic) Fair -   Weight Shift Sitting Fair   Weight Shift Standing Fair   Skilled Intervention Verbal Cuing;Tactile Cuing;Sequencing;Postural Facilitation   Comments " w/FWW   Bed Mobility    Supine to Sit Supervised   Scooting Supervised   Comments HOB raised and bedrail used   Gait Analysis   Gait Level Of Assist Contact Guard Assist   Assistive Device Front Wheel Walker   Distance (Feet) 50   # of Times Distance was Traveled 1   Deviation Antalgic;Step To;Bradykinetic   Weight Bearing Status WBAT L LE   Skilled Intervention Tactile Cuing;Verbal Cuing;Compensatory Strategies   Comments Patient performing TTWB L LE 2/2 pain, able to obtain foot flat but with 8/10 pain in standing   Functional Mobility   Sit to Stand Minimal Assist   How much difficulty does the patient currently have...   Turning over in bed (including adjusting bedclothes, sheets and blankets)? 3   Sitting down on and standing up from a chair with arms (e.g., wheelchair, bedside commode, etc.) 3   Moving from lying on back to sitting on the side of the bed? 3   How much help from another person does the patient currently need...   Moving to and from a bed to a chair (including a wheelchair)? 3   Need to walk in a hospital room? 3   Climbing 3-5 steps with a railing? 2   6 clicks Mobility Score 17   Patient / Family Goals    Patient / Family Goal #1 to walk indep   Goal #1 Outcome Progressing as expected   Short Term Goals    Short Term Goal # 1 in 6 visits patient will demo all functional trasnfers with sup and LRAD for safe DC   Goal Outcome # 1 Progressing as expected   Short Term Goal # 2 in 6 visits patient will ambulate 200' sup w/LRAD for safe DC   Goal Outcome # 2 Progressing as expected   Short Term Goal # 3 in 6 visits patient will demo bed mobility indep for safe DC   Goal Outcome # 3 Progressing as expected   Physical Therapy Treatment Plan   Physical Therapy Treatment Plan Continue Current Treatment Plan   Anticipated Discharge Equipment and Recommendations   DC Equipment Recommendations Unable to determine at this time   Discharge Recommendations Recommend post-acute placement for additional  physical therapy services prior to discharge home     Petra Valera, PT, DPT, GCS

## 2023-01-31 NOTE — PREADMISSION SCREENING NOTE
Pre-Admission Screening Form    Patient Information:   Name: Kristin Balderrama     MRN: [id number]       : 1990      Age: 32 y.o.   Gender: female      Race: White [7]       Marital Status: Single [1]  Family Contact: Vivienne Mclean Michelle        Relationship: Grandparent [7]  Mother [8]  Home Phone: [phone]           Cell Phone: [phone]  Advanced Directives: None  Code Status:  FULL  Current Attending Provider: Ilana Calabrese M.D.  Referring Physician: Dr. Chavez      Physiatrist Consult: Dr. Grissom       Referral Date: 23  Primary Payor Source:  MEDICARE  Secondary Payor Source:  MEDICAID FFS    Medical Information:   Date of Admission to Acute Care [facility]20[facility]  Room Number: T307/02  Rehabilitation Diagnosis: 0008.51 - Orthopaedic Disorders: Status Post Unilateral Hip Replacement  Immunization History   Administered Date(s) Administered    DTP - Historical vaccine 1989, 1990, 1994    HIB Vaccine PRP-OMP (PEDVAX) 1991    HPV Quadrivalent Vaccine (GARDASIL) - HISTORICAL DATA 2011, 2011, 10/27/2011    Hepatitis A Vaccine, Adult 2017    Hepatitis B Vaccine Adolescent/Pediatric 10/29/1999, 10/28/2003, 2004    Hib Vaccine (Prp-d) - HISTORICAL DATA 1991    INFLUENZA TIV (IM) 2011    Influenza (IM) Preservative Free - HISTORICAL DATA 2011, 2013, 2015    Influenza Seasonal Injectable - Historical Data 2011, 2011, 2014, 2016    Influenza Vaccine Adult HD 2019    Influenza Vaccine Quad Inj (Pf) 2017, 2018, 2019    Influenza, Unspecified - HISTORICAL DATA 2016, 2022    Jade SARS-CoV-2 Vaccine 2021    MMR Vaccine 1991, 1994    MODERNA SARS-COV-2 VACCINE (12+) 2022    OPV TRIVALENT - HISTORICAL DATA (GIVEN PRIOR TO MAY 2016) 1989, 1990, 05/10/1990, 1991, 1994     "Pneumococcal Conjugate Vaccine (Prevnar/PCV-13) 09/06/2019    Pneumococcal polysaccharide vaccine (PPSV-23) 11/12/2015, 01/14/2017, 01/23/2017, 09/19/2019    QUANTIFERON GOLD - HISTORICAL DATA 03/25/2020    TD Vaccine 09/18/2010    Tdap Vaccine 08/06/2012, 07/23/2017    dT Vaccine - HISTORICAL DATA 05/10/1990, 05/17/1991     Allergies   Allergen Reactions    Cefdinir Shortness of Breath and Itching     Tolerated 1/18/17  Tolerates ceftriaxone  Tolerated augmentin 8/2019     Depakote [Divalproex Sodium] Unspecified     Muscle spasms/muscle pain and weakness      Doxycycline Anaphylaxis and Vomiting     Other reaction(s): pustules/blisters  Other reaction(s): stomach pain    Montelukast [Singulair] Unspecified     Cardiac effusion    Vancomycin Itching     Pt becomes flushed in face and chest.   RXN=7/10/16    Amitriptyline Unspecified     Headaches      Aripiprazole [Abilify] Unspecified     Headaches/muscle twitching      Clindamycin Nausea         Other reaction(s): nausea stomach pain    Flomax [Tamsulosin Hydrochloride] Swelling    Levaquin Unspecified     Severe muscle cramps in legs  RXN=unknown  Other reaction(s): leg muscle cramps    Metformin Unspecified     Increased lactic acid     Other reaction(s): itching and rash/nausea vomiting    Tamsulosin Swelling     Swelling of legs    Tape Rash     Tears skin off  coban with Tegaderm tape ok intermittently  RXN=ongoing    Wound Dressing Adhesive Hives     By pt report    Amoxicillin Rash    Depakote [Divalproex Sodium]     Erythromycin Rash     .  Other reaction(s): nausea stomach pain    Hydromorphone      Other reaction(s): vomiting    Ampicillin Rash     Pt reports that she received a rash     Ciprofloxacin Rash          Keflex Rash     Pt states she gets a rash with this medication  Tolerates ceftriaxone  Can take with Benadryl    Levofloxacin Unspecified     Leg muscle cramps    Metronidazole Rash     \"Vision problems\"  Other reaction(s): vision problems    " "Penicillins Hives     Can take with Benadryl    Sulfa Drugs Itching and Myalgia     Muscle pain and weakness  Other reaction(s): unknown    Valproic Acid Rash     .     Past Medical History:   Diagnosis Date    Abdominal pain     Anginal syndrome     random chest pain especially with tachycardia    Apnea, sleep     Arrhythmia     \"sinus tachycardia\", cariologist, Dr. Kumar; ablation 2/2016    Arthritis     osteo    ASTHMA     when around smoke    Back pain     Borderline personality disorder (HCC)     Breath shortness     with tachycardia    Bronchitis 02/08/2022    Last time was 12/21    Cardiac arrhythmia     Chickenpox     Chronic UTI 09/18/2010    Cough     Daytime sleepiness     Dental disorder 03/08/2021    infected tooth    Depression     Diabetes (HCC)     Diarrhea     incontinece    Disorder of thyroid     Hashimoto's    Fall     Fatigue     Frequent headaches     Gasping for breath     Gynecological disorder     PCOS    Headache(784.0)     Heart burn     Heart murmur     History of falling     Indigestion     Migraine     Mitochondrial disease (HCC)     Multiple personality disorder (HCC)     Nausea     Obesity     Other fatigue 06/29/2020    Pain 08/15/2012    back, 7/10    Painful joint     PCOS (polycystic ovarian syndrome)     Pneumonia 2012 12/2020    Psychosis (HCC)     Ringing in ears     Scoliosis     Shortness of breath     O2 as needed    Sinus tachycardia 10/31/2013    Sleep apnea     CPAP \"pulmonary doctor took me off mid year 2016\"    Snoring     Tonsillitis     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HCC)     2/8/22: Per pt: not anymore    Tuberculosis     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    Urinary bladder disorder     Suprapubic cath. 2/8/22: Not anymore.     Urinary incontinence     Weakness     Wears glasses      Past Surgical History:   Procedure Laterality Date    PB PERCUT FIX PBOX/NECK FEMUR FX Left 1/28/2023    Procedure: FIXATION, HIP, USING CANNULATED SCREW;  Surgeon: Noman Abdul, " M.D.;  Location: SURGERY [facility];  Service: Orthopedics    MA LAP,DIAGNOSTIC ABDOMEN  2/14/2022    Procedure: LAPAROSCOPY;  Surgeon: Seamus Pisano M.D.;  Location: SURGERY SAME DAY [facility];  Service: Gynecology    OVARIAN CYSTECTOMY Right 2/14/2022    Procedure: EXCISION, CYST, OVARY;  Surgeon: Seamus Pisano M.D.;  Location: SURGERY SAME DAY [facility];  Service: Gynecology    BOWEL STIMULATOR INSERTION  3/10/2021    Procedure: INSERTION, ELECTRODE LEADS AND PULSE GENERATOR, NEUROSTIMULATOR, SACRAL - REMOVAL OF INTERSTIM WITH REPLACEMENT OF SACRAL NEUROMODULATION DEVICE;  Surgeon: Joe Noyola M.D.;  Location: SURGERY [facility];  Service: General    MUSCLE BIOPSY Right 1/26/2017    Procedure: MUSCLE BIOPSY - THIGH;  Surgeon: Isidro Vigil M.D.;  Location: [facility];  Service:     GASTROSCOPY WITH BALLOON DILATATION N/A 1/4/2017    Procedure: GASTROSCOPY WITH DILATATION;  Surgeon: Torres Vargas M.D.;  Location: [facility];  Service:     BOWEL STIMULATOR INSERTION  7/13/2016    Procedure: BOWEL STIMULATOR INSERTION FOR PERMANENT INTERSTIM SACRAL IMPLANT;  Surgeon: Joe Noyola M.D.;  Location: SURGERY [facility];  Service:     RECOVERY  1/27/2016    Procedure: CATH LAB EP STUDY WITH SINUS NODE MODIFICATION LA;  Surgeon: [facility];  [place] [facility];  Service:     OTHER CARDIAC SURGERY  1/2016    cardiac ablation    NEURO DEST FACET L/S W/IG SNGL  4/21/2015    Performed by Reza Tabor at [facility]    LUMBAR FUSION ANTERIOR  8/21/2012    Performed by SUSIE SAWANT at [facility] ORS    ALYSSA BY LAPAROSCOPY  8/29/2010    Performed by SHAYY JOHNS at [facility] ORS    LAMINOTOMY      OTHER ABDOMINAL SURGERY      TONSILLECTOMY      tonsillectomy       History Leading to Admission, Conditions that Caused the Need for Rehab (CMS):     Date of Service: 01/27/23     CC: Pain of the Left Hip       "  HPI: Kristin Balderrama is a 32 y.o. female who presents to establish orthopedic care for left hip pain. Per report the patient sustained an injury on 1/25/2023 after fall from standing. Pain is worse with movement and relieved by rest. Denies any numbness or tingling.         She was initially seen at [facility] and sent for advanced imaging due to concern for a possible occult femoral neck fracture.  Since her injury the patient has been unable to bear weight to her left leg.  She has been in a wheelchair.  She has severe pain requiring hydrocodone for pain control.     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     HPI  Pain Assessment  Pain Assessment: 0-10  Pain Score: 8  Pain Location: Hip  Pain Orientation: Left  Pain Descriptors: Burning, Sharp  Pain Frequency: Constant/continuous                    PMH:   Past Medical History  Past Medical History:  Diagnosis Date   Abdominal pain     Anginal syndrome      random chest pain especially with tachycardia   Apnea, sleep     Arrhythmia      \"sinus tachycardia\", cariologist, Dr. Kumar; ablation 2/2016   Arthritis      osteo   ASTHMA      when around smoke   Back pain     Borderline personality disorder (HCC)     Breath shortness      with tachycardia   Bronchitis 02/08/2022    Last time was 12/21   Cardiac arrhythmia     Chickenpox     Chronic UTI 09/18/2010   Cough     Daytime sleepiness     Dental disorder 03/08/2021    infected tooth   Depression     Diabetes (HCC)     Diarrhea      incontinece   Disorder of thyroid      Hashimoto's   Fall     Fatigue     Frequent headaches     Gasping for breath     Gynecological disorder      PCOS   Headache(784.0)     Heart burn     Heart murmur     History of falling     Indigestion     Migraine     Mitochondrial disease (HCC)     Multiple personality disorder (HCC)     Nausea     Obesity     Other fatigue 06/29/2020   Pain 08/15/2012    back, 7/10   Painful joint     PCOS (polycystic ovarian syndrome)  " "   Pneumonia 2012 12/2020   Psychosis (HCC)     Ringing in ears     Scoliosis     Shortness of breath      O2 as needed   Sinus tachycardia 10/31/2013   Sleep apnea      CPAP \"pulmonary doctor took me off mid year 2016\"   Snoring     Tonsillitis     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HCC)      2/8/22: Per pt: not anymore   Tuberculosis      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   Urinary bladder disorder      Suprapubic cath. 2/8/22: Not anymore.    Urinary incontinence     Weakness     Wears glasses            PSH:   Past Surgical History  Past Surgical History:  Procedure Laterality Date   MT LAP,DIAGNOSTIC ABDOMEN   2/14/2022    Procedure: LAPAROSCOPY;  Surgeon: Seamus Pisano M.D.;  Location: SURGERY SAME DAY [facility];  Service: Gynecology   OVARIAN CYSTECTOMY Right 2/14/2022    Procedure: EXCISION, CYST, OVARY;  Surgeon: Seamus Pisano M.D.;  Location: SURGERY SAME DAY [facility];  Service: Gynecology   BOWEL STIMULATOR INSERTION   3/10/2021    Procedure: INSERTION, ELECTRODE LEADS AND PULSE GENERATOR, NEUROSTIMULATOR, SACRAL - REMOVAL OF INTERSTIM WITH REPLACEMENT OF SACRAL NEUROMODULATION DEVICE;  Surgeon: Joe Noyola M.D.;  Location: [facility];  Service: General   MUSCLE BIOPSY Right 1/26/2017    Procedure: MUSCLE BIOPSY - THIGH;  Surgeon: Isidro Vigil M.D.;  Location: [facility];  Service:    GASTROSCOPY WITH BALLOON DILATATION N/A 1/4/2017    Procedure: GASTROSCOPY WITH DILATATION;  Surgeon: Torres Vargas M.D.;  Location: [facility];  Service:    BOWEL STIMULATOR INSERTION   7/13/2016    Procedure: BOWEL STIMULATOR INSERTION FOR PERMANENT INTERSTIM SACRAL IMPLANT;  Surgeon: Joe Noyola M.D.;  Location: SURGERY [facility];  Service:    RECOVERY   1/27/2016    Procedure: CATH LAB EP STUDY WITH SINUS NODE MODIFICATION ABHINAV;  Surgeon: [facility];  [place] [facility]  Service:    OTHER CARDIAC SURGERY   1/2016    cardiac " ablation   NEURO DEST FACET L/S W/IG SNGL   4/21/2015    Performed by Reza Tabor at [facility]ERY [facility]   LUMBAR FUSION ANTERIOR   8/21/2012    Performed by SUSIE SAWANT at SURGERY [facility] ORS   ALYSSA BY LAPAROSCOPY   8/29/2010    Performed by SHAYY JOHNS at SURGERY [facility] ORS   LAMINOTOMY       OTHER ABDOMINAL SURGERY       TONSILLECTOMY        tonsillectomy          FH:   Family History  Family History  Problem Relation Age of Onset   Hypertension Mother     Sleep Apnea Mother     Heart Disease Mother     Other Mother          hypothryod   Hypertension Maternal Uncle     Heart Disease Maternal Grandmother     Hypertension Maternal Grandmother     No Known Problems Sister     Other Sister          Narcolepsy;fibromyalsia;bone;nerve   Genitourinary () Problems Sister          endometriosis          SH:   Social History         Socioeconomic History   Marital status: Single      Spouse name: Not on file   Number of children: Not on file   Years of education: Not on file   Highest education level: Not on file  Occupational History   Not on file  Tobacco Use   Smoking status: Never   Smokeless tobacco: Never  Vaping Use   Vaping Use: Never used  Substance and Sexual Activity   Alcohol use: No      Alcohol/week: 0.0 oz   Drug use: Not Currently      Frequency: 7.0 times per week      Types: Marijuana   Sexual activity: Not Currently      Birth control/protection: Implant  Other Topics Concern   Not on file  Social History Narrative    ** Merged History Encounter **            Social Determinants of Health       Financial Resource Strain: Not on file  Food Insecurity: Not on file  Transportation Needs: Not on file  Physical Activity: Not on file  Stress: Not on file  Social Connections: Not on file  Intimate Partner Violence: Not on file  Housing Stability: Not on file          ROS: 12 point review of systems reviewed the patient negative unless stated in the HPI.  Review of Systems     There  were no vitals taken for this visit.     Physical Exam:  General: Well nourished, well developed, age appropriate appearance   HEENT: Normocephalic, atraumatic  Psych: Normal mood and affect  Neck: Supple, no pain to motion  Chest/Pulmonary: breathing unlabored, no audible wheezing  Cardio: Well perfused extremities  Neuro: Sensation grossly intact to BUE and BLE, moving all four extremities  Skin: Intact with no full thickness abrasions or lacerations  MSK: Left lower extremity.  No open wounds skin is intact.  Severe pain with any passive gentle range of motion of the left hip.  She is able to just minimally flex her hip 5 to 10 degrees without having severe pain.  She can extend at the knee minimally.  Intact EHL FHL ankle dorsiflexion and plantarflexion down at the foot.  Sensation grossly intact to light touch throughout the left lower extremity.     Imaging and labs:   DX-HIP-COMPLETE - UNILATERAL 2+ LEFT  2 views of the left hip demonstrate a nondisplaced left femoral neck   fracture that is better seen on the CT scan in our PAC system.        CT of the pelvis from 1/25/2022 taken from an outside hospital demonstrates a nondisplaced left femoral neck fracture at the base of the femoral neck.        Assessment & Plan   Encounter Diagnoses:   Left hip pain     Closed fracture of neck of left femur, initial encounter (HCC)     Findings discussed in detail the patient.  Did review her imaging with her.  I explained her CT is consistent with a nondisplaced femoral neck fracture.  Given her age, and severe pain I am recommending she go straight to the emergency department for treatment of her femoral neck fracture.  She will likely undergo close reduction and percutaneous screw fixation to the left femoral neck fracture tomorrow with Dr. Abdul.     She understands the risk of further displacement of her fracture if she were to fall or have any additional trauma to her left hip.     Orders Placed This  "Encounter   DX-HIP-COMPLETE - UNILATERAL 2+ LEFT        No follow-ups on file.         Physical Medicine and Rehabilitation Consultation                                                                                  Date of initial consultation: 1/30/2023  Consulting provider: Bradlye Chavez D.O.  Reason for consultation: assess for acute inpatient rehab appropriateness  LOS: 3 Day(s)     Chief complaint: Left hip fracture     HPI: The patient is a 32 y.o. right hand dominant female with a past medical history of asthma, Hashimoto's, sleep apnea, type 2 diabetes, pots syndrome and Annetta-Danlos syndrome;  who presented on 1/27/2023  4:51 PM with left hip pain after ground-level fall secondary to postural orthostatic tachycardia syndrome.  Patient was evaluated by orthopedic surgeon Dr. Noman Abdul MD who found left nondisplaced femoral neck fracture, and took patient to the OR on 1/28 for percutaneous fixation of left hip fracture     The patient currently reports left hip pain.  Patient endorses bowel movement today.  Patient endorses back pain, from a \"bad back\" for which she is on Medicare disability.  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  Patient is interested in IPR     ROS  Pertinent positives are mentioned in the HPI, all others reviewed and are negative.     Social Hx:  1 SH  Ramp to enter  With: Grandmother and aunt     THERAPY:  Restrictions: WBAT LLE  PT: Functional mobility   1/29: Walking 5 feet with front wheel walker at min assist     OT: ADLs  1/29: Max assist lower body dressing     SLP:   None     IMAGING:  XR hip 1/27/2023  2 views of the left hip demonstrate a nondisplaced left femoral neck   fracture that is better seen on the CT scan in our PAC system.     PROCEDURES:  Noman Abdul MD 1/28/2023  Percutaneous fixation of left femoral neck fracture     PMH:  Past Medical History  Past Medical History:  Diagnosis Date   Abdominal pain     Anginal " "syndrome      random chest pain especially with tachycardia   Apnea, sleep     Arrhythmia      \"sinus tachycardia\", cariologist, Dr. Kumar; ablation 2/2016   Arthritis      osteo   ASTHMA      when around smoke   Back pain     Borderline personality disorder (HCC)     Breath shortness      with tachycardia   Bronchitis 02/08/2022    Last time was 12/21   Cardiac arrhythmia     Chickenpox     Chronic UTI 09/18/2010   Cough     Daytime sleepiness     Dental disorder 03/08/2021    infected tooth   Depression     Diabetes (HCC)     Diarrhea      incontinece   Disorder of thyroid      Hashimoto's   Fall     Fatigue     Frequent headaches     Gasping for breath     Gynecological disorder      PCOS   Headache(784.0)     Heart burn     Heart murmur     History of falling     Indigestion     Migraine     Mitochondrial disease (HCC)     Multiple personality disorder (HCC)     Nausea     Obesity     Other fatigue 06/29/2020   Pain 08/15/2012    back, 7/10   Painful joint     PCOS (polycystic ovarian syndrome)     Pneumonia 2012 12/2020   Psychosis (HCC)     Ringing in ears     Scoliosis     Shortness of breath      O2 as needed   Sinus tachycardia 10/31/2013   Sleep apnea      CPAP \"pulmonary doctor took me off mid year 2016\"   Snoring     Tonsillitis     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HCC)      2/8/22: Per pt: not anymore   Tuberculosis      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   Urinary bladder disorder      Suprapubic cath. 2/8/22: Not anymore.    Urinary incontinence     Weakness     Wears glasses            PSH:  Past Surgical History  Past Surgical History:  Procedure Laterality Date   IN LAP,DIAGNOSTIC ABDOMEN   2/14/2022    Procedure: LAPAROSCOPY;  Surgeon: Seamus Pisano M.D.;  Location: SURGERY SAME DAY [facility];  Service: Gynecology   OVARIAN CYSTECTOMY Right 2/14/2022    Procedure: EXCISION, CYST, OVARY;  Surgeon: Seamus Pisano M.D.;  Location: SURGERY SAME DAY [facility];  Service: Gynecology   BOWEL STIMULATOR " INSERTION   3/10/2021    Procedure: INSERTION, ELECTRODE LEADS AND PULSE GENERATOR, NEUROSTIMULATOR, SACRAL - REMOVAL OF INTERSTIM WITH REPLACEMENT OF SACRAL NEUROMODULATION DEVICE;  Surgeon: Joe Noyola M.D.;  Location: SURGERY [facility];  Service: General   MUSCLE BIOPSY Right 1/26/2017    Procedure: MUSCLE BIOPSY - THIGH;  Surgeon: Isidro Vigil M.D.;  Location: SURGERY [facility];  Service:    GASTROSCOPY WITH BALLOON DILATATION N/A 1/4/2017    Procedure: GASTROSCOPY WITH DILATATION;  Surgeon: Torres Vargas M.D.;  Location: SURGERY [facility];  Service:    BOWEL STIMULATOR INSERTION   7/13/2016    Procedure: BOWEL STIMULATOR INSERTION FOR PERMANENT INTERSTIM SACRAL IMPLANT;  Surgeon: Joe Noyola M.D.;  Location: SURGERY [facility];  Service:    RECOVERY   1/27/2016    Procedure: CATH LAB EP STUDY WITH SINUS NODE MODIFICATION LA;  Surgeon: [facility];  Location: SURGERY PRE-[facility] PROC UNIT [facility];  Service:    OTHER CARDIAC SURGERY   1/2016    cardiac ablation   NEURO DEST FACET L/S W/IG SNGL   4/21/2015    Performed by Reza Tabor at [facility]ERY [facility]   LUMBAR FUSION ANTERIOR   8/21/2012    Performed by SUSIE SAWANT at SURGERY [facility] ORS   ALYSSA BY LAPAROSCOPY   8/29/2010    Performed by SHAYY JOHNS at SURGERY [facility] ORS   LAMINOTOMY       OTHER ABDOMINAL SURGERY       TONSILLECTOMY        tonsillectomy          FHX:  Family History  Family History  Problem Relation Age of Onset   Hypertension Mother     Sleep Apnea Mother     Heart Disease Mother     Other Mother          hypothryod   Hypertension Maternal Uncle     Heart Disease Maternal Grandmother     Hypertension Maternal Grandmother     No Known Problems Sister     Other Sister          Narcolepsy;fibromyalsia;bone;nerve   Genitourinary () Problems Sister          endometriosis          Medications:  Current Facility-Administered Medications  Medication Dose   gabapentin (NEURONTIN) capsule 100  mg  100 mg   methocarbamol (ROBAXIN) tablet 500 mg  500 mg   enoxaparin (Lovenox) inj 40 mg  40 mg   senna-docusate (PERICOLACE or SENOKOT S) 8.6-50 MG per tablet 2 Tablet  2 Tablet    And   polyethylene glycol/lytes (MIRALAX) PACKET 1 Packet  1 Packet    And   magnesium hydroxide (MILK OF MAGNESIA) suspension 30 mL  30 mL    And   bisacodyl (DULCOLAX) suppository 10 mg  10 mg   Respiratory Therapy Consult     acetaminophen (Tylenol) tablet 650 mg  650 mg   Pharmacy Consult Request ...Pain Management Review 1 Each  1 Each   oxyCODONE immediate-release (ROXICODONE) tablet 2.5 mg  2.5 mg    Or   oxyCODONE immediate-release (ROXICODONE) tablet 5 mg  5 mg    Or   morphine 4 MG/ML injection 2 mg  2 mg   labetalol (NORMODYNE/TRANDATE) injection 10 mg  10 mg   ondansetron (ZOFRAN) syringe/vial injection 4 mg  4 mg   ondansetron (ZOFRAN ODT) dispertab 4 mg  4 mg   promethazine (PHENERGAN) tablet 12.5-25 mg  12.5-25 mg   promethazine (PHENERGAN) suppository 12.5-25 mg  12.5-25 mg   prochlorperazine (COMPAZINE) injection 5-10 mg  5-10 mg   albuterol inhaler 2 Puff  2 Puff   diphenhydrAMINE (BENADRYL) tablet/capsule 50 mg  50 mg   ipratropium-albuterol (DUONEB) nebulizer solution  3 mL   ivabradine (Corlanor) tablet 7.5 mg  7.5 mg   melatonin tablet 10 mg  10 mg   metoprolol SR (TOPROL XL) tablet 25 mg  25 mg   traZODone (DESYREL) tablet 100 mg  100 mg   ziprasidone (GEODON) capsule 40 mg  40 mg        Allergies:  Allergies  Allergen Reactions   Cefdinir Shortness of Breath and Itching      Tolerated 1/18/17  Tolerates ceftriaxone  Tolerated augmentin 8/2019    Depakote [Divalproex Sodium] Unspecified      Muscle spasms/muscle pain and weakness      Doxycycline Anaphylaxis and Vomiting      Other reaction(s): pustules/blisters  Other reaction(s): stomach pain   Montelukast [Singulair] Unspecified      Cardiac effusion   Vancomycin Itching      Pt becomes flushed in face and chest.   RXN=7/10/16   Amitriptyline Unspecified      " Headaches      Aripiprazole [Abilify] Unspecified      Headaches/muscle twitching      Clindamycin Nausea            Other reaction(s): nausea stomach pain   Flomax [Tamsulosin Hydrochloride] Swelling   Levaquin Unspecified      Severe muscle cramps in legs  RXN=unknown  Other reaction(s): leg muscle cramps   Metformin Unspecified      Increased lactic acid      Other reaction(s): itching and rash/nausea vomiting   Tamsulosin Swelling      Swelling of legs   Tape Rash      Tears skin off  coban with Tegaderm tape ok intermittently  RXN=ongoing   Wound Dressing Adhesive Hives      By pt report   Amoxicillin Rash   Depakote [Divalproex Sodium]     Erythromycin Rash      .  Other reaction(s): nausea stomach pain   Hydromorphone        Other reaction(s): vomiting   Ampicillin Rash      Pt reports that she received a rash    Ciprofloxacin Rash          Keflex Rash      Pt states she gets a rash with this medication  Tolerates ceftriaxone  Can take with Benadryl   Levofloxacin Unspecified      Leg muscle cramps   Metronidazole Rash      \"Vision problems\"  Other reaction(s): vision problems   Penicillins Hives      Can take with Benadryl   Sulfa Drugs Itching and Myalgia      Muscle pain and weakness  Other reaction(s): unknown   Valproic Acid Rash      .           Physical Exam:  Vitals: /74   Pulse 72   Temp 36.5 °C (97.7 °F) (Temporal)   Resp 20   Ht 1.626 m (5' 4\")   Wt 106 kg (234 lb)   SpO2 93%   Gen: NAD  Head: NC/AT  Eyes/ Nose/ Mouth: PERRLA, moist mucous membranes  Cardio: RRR, good distal perfusion, warm extremities  Pulm: normal respiratory effort, no cyanosis   Abd: Soft NTND, negative borborygmi   Ext: No peripheral edema. No calf tenderness. No clubbing.     Mental status:  A&Ox4 (person, place, date, situation) answers questions appropriately follows commands  Speech: fluent, no aphasia or dysarthria     Motor:                            Upper Extremity  Myotome R L  Shoulder " flexion C5 5 5  Elbow flexion C5 5 5  Wrist extension C6 5 5  Elbow extension C7 5 5  Finger flexion C8 5 5  Finger abduction T1 5 5     Lower Extremity Myotome R L  Hip flexion L2 5 2/5  Knee extension L3 5 3/5  Ankle dorsiflexion L4 5 4/5  Toe extension L5 5 4/5  Ankle plantarflexion S1 5 4/5     Sensory:   intact to light touch through out     Labs: Reviewed and significant for   Recent Labs    232 23  0533  RBC 4.21 4.04*  HEMOGLOBIN 13.0 12.5  HEMATOCRIT 37.9 36.5*  PLATELETCT 146* 154*     Recent Labs    23  0442 23  0533  SODIUM 139 136  POTASSIUM 3.7 3.9  CHLORIDE 108 106  CO2 19* 20  GLUCOSE 93 167*  BUN 13 9  CREATININE 0.61 0.54  CALCIUM 9.2 8.9     Recent Results  Recent Results (from the past 24 hour(s))  EKG    Collection Time: 23  6:07 PM  Result Value Ref Range    Report          [facility]     Test Date:  2023  Pt Name:    HARLEY DE LA CRUZ              Department: 131  MRN:        [id number]                      Room:       [facility]  Gender:     Female                       Technician: [facility]  :        1990                   Requested By:DANNIELLE JUNG  Order #:    [id number]                    Reading MD: Abhishek Smtih MD     Measurements  Intervals                                Axis  Rate:       82                           P:          24  NV:         128                          QRS:        21  QRSD:       95                           T:          14  QT:         385  QTc:        450     Interpretive Statements  SINUS RHYTHM  BORDERLINE T WAVE ABNORMALITIES  Compared to ECG 12/10/2022 17:22:05  No significant changes  Electronically Signed On 2023 23:56:50 PST by Abhishek Smith MD     TROPONIN    Collection Time: 23  8:33 PM  Result Value Ref Range    Troponin T <6 6 - 19 ng/L             ASSESSMENT:  Patient is a 32 y.o. female admitted with left hip fracture secondary to syncope from POTS, now s/p percutaneous fixation by  Dr. Abdul on 1/28/2023     [facility] Code / Diagnosis to Support: 0008.51 - Orthopaedic Disorders: Status Post Unilateral Hip Replacement     Rehabilitation: Impaired ADLs and mobility  Patient is a good candidate for inpatient rehab based on needs for PT, OT.  Patient will also benefit from family training.  Patient has a good discharge situation which will be home with family.      Barriers to transfer include: Insurance authorization, TCCs to verify disposition, medical clearance and bed availability      All cases are subject to administrative review and recommendations may change     Disposition recommendations:  -Good candidate for IPR, patient has deficits with mobility and ADL secondary to left hip fracture.  Patient has good family support from her aunt and grandmother.  Patient has a stable discharge location which is her single-story home with ramp to enter  -TCC to verify discharge support  -PMR to follow in the periphery for rehab appropriateness, please reach out with questions or request for medical management     Medical Complexity:     Left hip fracture  -Status post percutaneous fixation by Dr. Abdul on 1/28/2023  -WBAT LLE  -Postop pain control per primary team  Continue PT OT while in house-  -plan for IPR     Postural orthostatic tachycardia syndrome  -High risk for syncope  -Metoprolol for rate control  DVT PPX: Lovenox        Thank you for allowing us to participate in the care of this patient.      Patient was seen for 82 minutes on unit/floor of which > 50% of time was spent on counseling and coordination of care regarding the above, including prognosis, risk reduction, benefits of treatment, and options for next stage of care.     Keenan Grissom, DO   Physical Medicine and Rehabilitation      Please note that this dictation was created using voice recognition software. I have made every reasonable attempt to correct obvious errors, but there may be errors of grammar and possibly content that I  "did not discover before finalizing the note.         Orthopaedic Progress Note     Interval changes:  Patient doing well   Dressings CDI  Cleared for DC home by ortho pending medicine clearance     ROS - Patient denies any new issues.  Pain well controlled.     BP 91/50   Pulse 69   Temp 36.8 °C (98.2 °F) (Temporal)   Resp 18   Ht 1.626 m (5' 4\")   Wt 105 kg (231 lb 7.7 oz)   SpO2 93%         Patient seen and examined  No acute distress  Breathing non labored  RRR  LLE surgical dressing is clean, dry, and intact. Patient clearly fires tibialis anterior, EHL, and gastrocnemius/soleus. Sensation is intact to light touch throughout superficial peroneal, deep peroneal, tibial, saphenous, and sural nerve distributions. Strong and palpable 2+ dorsalis pedis and posterior tibial pulses with capillary refill less than 2 seconds. No lower leg tenderness or discomfort.       Recent Labs    01/28/23  0442 01/29/23  0533  WBC 5.0 6.3  RBC 4.21 4.04*  HEMOGLOBIN 13.0 12.5  HEMATOCRIT 37.9 36.5*  MCV 90.0 90.3  MCH 30.9 30.9  MCHC 34.3 34.2  RDW 40.5 39.6  PLATELETCT 146* 154*  MPV 11.9 12.0        Active Hospital Problems    Diagnosis     TONYA (obstructive sleep apnea) [G47.33]        Priority: Medium   Schizophrenia ([facility]) [F20.9]        Priority: Low   Morbidly obese ([facility]) [E66.01]        Priority: Low      IMO load March 2020      Postural orthostatic tachycardia syndrome [G90.A]     Hip fracture, left, closed, initial encounter ([facility]) [S72.002A]     Moderate asthma [J45.909]          Assessment/Plan:  Patient doing well    Dressings CDI  Cleared for DC home by ortho pending medicine clearance  POD#2 S/P Percutaneous fixation of left femoral neck fracture  Wt bearing status - WBAT BLE  Wound care/Drains - Dressings to be changed every other day by nursing  Future Procedures - none planned   Lovenox: Start 1/29, Duration-until ambulatory > 150'  Sutures/Staples out- 14 days post operatively  PT/OT-initiated  Antibiotics: " Perioperative completed  DVT Prophylaxis- TEDS/SCDs/Foot pumps  Andrade-none needed  Case Coordination for Discharge Planning - Disposition home         Hospital Medicine Daily Progress Note     Date of Service  1/30/2023     Chief Complaint  Kristin Balderrama is a 32 y.o. female admitted 1/27/2023 with fall     Hospital Course  No notes on file     Interval Problem Update  Patient was seen and examined at bedside.  No acute events overnight. Patient is resting comfortably in bed and in no acute distress.      01/28 Percutaneous fixation of left femoral neck fracture  Multimodal pain approach  Await PT/OT     I have discussed this patient's plan of care and discharge plan at IDT rounds today with Case Management, Nursing, Nursing leadership, and other members of the IDT team.     Consultants/Specialty  orthopedics     Code Status  Full Code     Disposition  Patient is medically cleared for discharge.   Anticipate discharge to to home with organized home healthcare and close outpatient follow-up.  I have placed the appropriate orders for post-discharge needs.     Review of Systems  Review of Systems   Constitutional:  Negative for chills and fever.   HENT:  Negative for congestion and sore throat.    Eyes:  Negative for blurred vision and double vision.   Respiratory:  Negative for cough and shortness of breath.    Cardiovascular:  Negative for chest pain and palpitations.   Gastrointestinal:  Negative for abdominal pain, diarrhea, nausea and vomiting.   Genitourinary:  Negative for dysuria and frequency.   Musculoskeletal:  Positive for falls and joint pain.   Skin:  Negative for rash.   Neurological:  Negative for seizures and headaches.   Psychiatric/Behavioral:  Negative for hallucinations and substance abuse.        Physical Exam  Temp:  [36.5 °C (97.7 °F)-37.1 °C (98.8 °F)] 36.8 °C (98.2 °F)  Pulse:  [61-87] 69  Resp:  [15-20] 18  BP: ()/(50-74) 91/50  SpO2:  [92 %-97 %] 93 %     Physical Exam  Vitals  and nursing note reviewed.   Constitutional:       General: She is not in acute distress.     Appearance: She is obese. She is not toxic-appearing.      Comments: Pleasant, conversational   HENT:      Head: Normocephalic.      Right Ear: External ear normal.      Left Ear: External ear normal.      Nose: No congestion.      Mouth/Throat:      Mouth: Mucous membranes are moist.      Pharynx: No oropharyngeal exudate.   Eyes:      General: No scleral icterus.     Pupils: Pupils are equal, round, and reactive to light.   Cardiovascular:      Rate and Rhythm: Normal rate and regular rhythm.      Heart sounds: No murmur heard.  Pulmonary:      Breath sounds: No wheezing.   Abdominal:      Palpations: Abdomen is soft.      Tenderness: There is no abdominal tenderness. There is no guarding or rebound.   Musculoskeletal:         General: No swelling or deformity.      Comments: Post surgical changes to left hip  Surgical dressing c/d/i  Left lower extremity neurovascularly in tact   Skin:     Coloration: Skin is not jaundiced or pale.   Neurological:      General: No focal deficit present.      Mental Status: She is alert and oriented to person, place, and time. Mental status is at baseline.   Psychiatric:         Mood and Affect: Mood normal.         Behavior: Behavior normal.         Fluids  No intake or output data in the 24 hours ending 01/30/23 1536        Laboratory  Recent Labs    01/28/23  0442 01/29/23  0533  WBC 5.0 6.3  RBC 4.21 4.04*  HEMOGLOBIN 13.0 12.5  HEMATOCRIT 37.9 36.5*  MCV 90.0 90.3  MCH 30.9 30.9  MCHC 34.3 34.2  RDW 40.5 39.6  PLATELETCT 146* 154*  MPV 11.9 12.0     Recent Labs    01/28/23  0442 01/29/23  0533  SODIUM 139 136  POTASSIUM 3.7 3.9  CHLORIDE 108 106  CO2 19* 20  GLUCOSE 93 167*  BUN 13 9  CREATININE 0.61 0.54  CALCIUM 9.2 8.9                    Imaging  [id number] (WITH 1-VIEW CXR)  Final Result     Normal rib series.     DX-HIP-UNILATERAL-W/O PELVIS-2/3 VIEWS LEFT  Final Result      Intraoperative fluoroscopy spot images as described above.     DX-PORTABLE FLUORO > 1 HOUR  Final Result     Portable fluoroscopy utilized for 35 seconds.        INTERPRETING LOCATION: [address] [place] [address], [facility], [id number]           Assessment/Plan  * Hip fracture, left, closed, initial encounter (HCC)- (present on admission)  Assessment & Plan  Orthopedics saw fractures left-sided CT scan  Pain control with oral and IV narcotics  01/28 Percutaneous fixation of left femoral neck fracture  Multimodal pain approach  Await PT/OT     Postural orthostatic tachycardia syndrome  Assessment & Plan  Precipitating cause of patients fall  Follow with cardiology  Continue Corlanor     Schizophrenia (HCC)- (present on admission)  Assessment & Plan  Continue home Geodon     TONYA (obstructive sleep apnea)- (present on admission)  Assessment & Plan  Nocturnal CPAP      Moderate asthma  Assessment & Plan  Continue home inhalers  Not in exacerbation     Morbidly obese (HCC)- (present on admission)  Assessment & Plan  Due to excess caloric intake  BMI 40           VTE prophylaxis: SCDs/TEDs and enoxaparin ppx     I have performed a physical exam and reviewed and updated ROS and Plan today (1/30/2023). In review of yesterday's note (1/29/2023), there are no changes except as documented above.         DATE OF OPERATION: 1/28/2023     PREOPERATIVE DIAGNOSIS: left nondisplaced femoral neck fracture     POSTOPERATIVE DIAGNOSIS: Same     PROCEDURE PERFORMED: Percutaneous fixation of left femoral neck fracture     SURGEON: Noman Abdul M.D.      ASSISTANT: none     ANESTHESIA: General     SPECIMEN: None     ESTIMATED BLOOD LOSS: <5 mL     IMPLANTS: OIC 7.3 mm cannulated screws        INDICATIONS: Kristin Balderrama is a 32 y.o. female who presented with a left femoral neck fracture after a ground-level fall.  Initial imaging showed no obvious fracture.  A CT scan did show a likely crack involving the superior femoral neck.  She was unable to  obtain an MRI due to spinal cord stimulator.  Based off the CT findings and her clinical exam though it seemed that she had a nondisplaced left femoral neck fracture.  Recommended percutaneous screw fixation.  I discussed the risks and benefits of the procedure which include but are not limited to risks of infection, wound healing complication, neurovascular injury, pain, malunion, non-union, possible need for additional pocedures and the medical risks of anesthesia including MI, stroke, and death.  Alternatives to surgery were also discussed, including non-operative management, which I did not recommend.  The patient was in agreement with the plan to proceed, and the informed consent was signed and documented.  I met with the patient pre-operatively and marked the operative extremity with their agreement.  We proceeded to the operating room.      DESCRIPTION OF PROCEDURE:  The patient was seen in the preoperative holding area on the day of surgery. The operative site was marked with my initials.  she was taken to the operating room and placed supine on the fracture table.  Anesthesia was induced.  Both feet were secured with a ski boot foot holders.  Imaging was brought in and confirmed the fracture remained valgus impacted.  The operative extremity was prepped and draped in the normal sterile fashion.  Operative pause was conducted and the correct patient, site, side, procedure, and surgeon's initials on extremity were identified.  3 percutaneous pins were placed into the femoral neck.  These were placed in an inverted triangle fashion.  These were placed to maximize the fill of the femoral neck to prevent any further shifting of the fracture.  The position of the pins were confirmed on AP and lateral views to be extra-articular.  A skin incision was then made over each pin and these were measured for length.  1 partially-threaded and 2 fully threaded [facility] 7.3 mm cannulated screws were then inserted over these  pins.  These were sequentially tightened.  The pins were then removed and final fluoroscopic images were obtained.  The surgical wounds were irrigated and closed with nylon suture.  Sterile dressings with Xeroform, 4 x 4 gauze, Tegaderm were placed.  The patient was allowed to wake in the operating room taken to PACU in stable condition.     POSTOPERATIVE PLAN: As tolerated weight bearing.  Mobilize with physical and occupational therapies.  DVT prophylaxis with SCDs and Lovenox until mobilizing independently and then can be switched to aspirin for 4 weeks.  The patient will follow up in clinic in 2 weeks to check wounds and remove sutures/staples and repeat x-rays.        ____________________________________   Noman Abdul M.D.   DD: 1/28/2023  2:49 PM      Co-morbidities:  See  PMH  Potential Risk - Complications: Contractures, Deep Vein Thrombosis, Pain, Pneumonia, and Pressure Ulcer  Level of Risk: High    Ongoing Medical Management Needed (Medical/Nursing Needs):   Patient Active Problem List    Diagnosis Date Noted    Postural orthostatic tachycardia syndrome 01/28/2023    Hip fracture, left, closed, initial encounter ([facility]) 01/27/2023    Moderate asthma 01/27/2023    Hypermobility syndrome 11/10/2022    Migraine, hemiplegic 07/27/2022    IBS (irritable bowel syndrome) 07/27/2022    Hemiplegic 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, not intractable 07/27/2022    Hidradenitis axillaris 07/10/2022    Vision changes 06/28/2022    Palpitations 05/13/2022    Chest pain, precordial 05/13/2022    Inappropriate sinus tachycardia 09/24/2021    Psychogenic disorder 06/24/2021    Diplopia 06/23/2021    Failure to thrive in adult 06/21/2021    Acute bilateral low back pain with bilateral sciatica 06/16/2021    Normal pressure hydrocephalus (HCC) 06/04/2021    GIB (gastrointestinal bleeding) 05/24/2021    Asthma exacerbation 04/15/2021    Blood per rectum 04/15/2021    Bilateral hand pain 04/05/2021    Suprapubic catheter  "dysfunction ([facility]) 02/05/2021    UTI (urinary tract infection) 10/11/2020    Seizures ([facility]) 09/04/2020    Dry eyes 08/18/2020    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[facility]) 08/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    Schizoaffective disorder, depressive type ([facility]) 03/02/2020    PTSD (post-traumatic stress disorder) 03/02/2020    Intracranial hypertension 02/27/2020    Mixed stress and urge urinary incontinence 12/27/2019    Mild intermittent asthma without complication 12/16/2019    Immunocompromised ([facility]) 11/06/2019    Metabolic acidosis 08/30/2019    Moderate persistent asthma with acute exacerbation 08/14/2019    Optic neuritis 05/27/2019    SVT (supraventricular tachycardia) ([facility]) 04/10/2019    Muscular deconditioning 07/14/2018    TONYA (obstructive sleep apnea) 01/09/2018    Functional diarrhea 01/05/2018    Depression 10/28/2016    Schizophrenia ([facility]) 10/27/2016    Full incontinence of feces 07/13/2016    Polypharmacy 06/27/2016    Vitamin D deficiency 05/21/2016    Lactic acidosis 04/19/2016    Morbidly obese ([facility]) 03/07/2016    Scoliosis 03/07/2016    GERD (gastroesophageal reflux disease) 03/07/2016    Peripheral neuropathy and chronic pain syndrome ([facility]) 03/06/2016    Fatty liver disease, nonalcoholic 01/19/2015    Anxiety 12/16/2014    Hyperglycemia 09/05/2014    Urinary retention 04/02/2011    Dissociative identity disorder ([facility]) 04/02/2011    Borderline personality disorder in adult ([facility]) 09/18/2010    AP (abdominal pain) 09/18/2010     A/o  Current Vital Signs:   Temperature: 36.6 °C (97.9 °F) Pulse: 73 Respiration: 18 Blood Pressure: 129/63  Weight: 105 kg (231 lb 7.7 oz) Height: 162.6 cm (5' 4\")  Pulse Oximetry: 92 % O2 (LPM): 3      Completed Laboratory Reports:  Recent Labs     01/29/23  0533   WBC 6.3   HEMOGLOBIN 12.5   HEMATOCRIT 36.5*   PLATELETCT 154*   SODIUM 136   POTASSIUM 3.9   BUN 9   CREATININE 0.54   ALBUMIN 3.9   GLUCOSE 167*     Additional Labs: Not Applicable    Prior Living Situation:   Housing / Facility: 1 Story House  Steps " Into Home:  (ramp)  Steps In Home: 12  Lives with - Patient's Self Care Capacity: Related Adult  Equipment Owned: Front-Wheel Walker, Single Point Cane, Wheelchair, Grab Bar(s) In Tub / Shower    Prior Level of Function / Living Situation:   Physical Therapy: Prior Services: Home-Independent  Housing / Facility: [address] [place] [address]  Steps Into Home:  (ramp)  Steps In Home: 12  Bathroom Set up: Walk In Shower, Grab Bars  Equipment Owned: Front-Wheel Walker, Single Point Cane, Wheelchair, Grab Bar(s) In Tub / Shower  Lives with - Patient's Self Care Capacity: Related Adult  Bed Mobility: Independent  Transfer Status: Independent  Ambulation: Independent  Distance Ambulation (Feet):  (short community distances)  Assistive Devices Used: Single Point Cane  Current Level of Function:   Gait Level Of Assist: Minimal Assist  Assistive Device: Front Wheel Walker  Distance (Feet): 5  Deviation: Antalgic, Bradykinetic  Weight Bearing Status: WBAT L LE  Supine to Sit: Supervised  Scooting: Moderate Assist  Comments: up to chair post session  Sit to Stand: Minimal Assist  Bed, Chair, Wheelchair Transfer: Minimal Assist  Sitting in Chair: post session  Sitting Edge of Bed: 10 min  Standin min  Occupational Therapy:   Self Feeding: Independent  Grooming / Hygiene: Independent  Bathing: Independent  Dressing: Independent  Toileting: Independent  Medication Management: Independent  Laundry: Independent  Kitchen Mobility: Independent  Finances: Independent  Home Management: Independent  Shopping: Independent  Prior Level Of Mobility: Independent With Device in Community  Prior Services: Home-Independent  Housing / Facility: [address] [place] [address]  Current Level of Function:   Upper Body Dressing: Supervision  Lower Body Dressing: Maximal Assist  Toileting:  (NT)  Speech Language Pathology:      Rehabilitation Prognosis/Potential: Good  Estimated Length of Stay: 10-14 days    Nursing:      Continent    Scope/Intensity of Services  Recommended:  Physical Therapy: 1.5 hr / day  5 days / week. Therapeutic Interventions Required: Maximize Endurance, Mobility, Strength, and Safety  Occupational Therapy: 1.5 hr / day 5 days / week. Therapeutic Interventions Required: Maximize Self Care, ADLs, IADLs, and Energy Conservation  Rehabilitation Nursin/7. Therapeutic Interventions Required: Monitor Pain, Skin, Vital Signs, Intake and Output, Labs, Safety, and Family Training  Rehabilitation Physician: 3 - 5 days / week. Therapeutic Interventions Required: Medical Management    She requires 24-hour rehabilitation nursing to manage bowel and bladder function, skin care, surgical incision, nutrition and fluid intake, pain control, safety, medication management, and patient/family goals. In addition, rehabilitation nursing will reiterate and reinforce therapy skills and equipment use, including ADLs, as well as provide education to the patient and family. Kristin Balderrama is willing to participate in and is able to tolerate the proposed plan of care.    Rehabilitation Goals and Plan (Expected frequency & duration of treatment in the IRF):   Return to the Community, Modified Independent Level of Care, Minimal Assist Level of Care, and Family Able to Provide  Assistance  Anticipated Date of Rehabilitation Admission: 23  Patient/Family oriented IRF level of care/facility/plan: Yes  Patient/Family willing to participate in IRF care/facility/plan: Yes  Patient able to tolerate IRF level of care proposed: Yes  Patient has potential to benefit IRF level of care proposed: Yes  Comments: Not Applicable    Special Needs or Precautions - Medical Necessity:  Safety Concerns/Precautions:  Fall Risk / High Risk for Falls and Balance  Pain Management  IV Site: Peripheral  Current Medications:    Current Facility-Administered Medications Ordered in Epic   Medication Dose Route Frequency Provider Last Rate Last Admin    gabapentin (NEURONTIN) capsule 100 mg   100 mg Oral TID ABBY KnowlesOEffie   100 mg at 01/31/23 0438    methocarbamol (ROBAXIN) tablet 500 mg  500 mg Oral TID ABBY KnowlesO.   500 mg at 01/31/23 0438    enoxaparin (Lovenox) inj 40 mg  40 mg Subcutaneous DAILY AT 1800 ABBY KnowlesO.   40 mg at 01/30/23 1723    senna-docusate (PERICOLACE or SENOKOT S) 8.6-50 MG per tablet 2 Tablet  2 Tablet Oral BID Solitario Martínez M.D.   2 Tablet at 01/31/23 0438    And    polyethylene glycol/lytes (MIRALAX) PACKET 1 Packet  1 Packet Oral QDAY PRN Solitario Martínez M.D.   1 Packet at 01/28/23 1714    And    magnesium hydroxide (MILK OF MAGNESIA) suspension 30 mL  30 mL Oral QDAY PRN Solitario Martínez M.D.   30 mL at 01/29/23 1656    And    bisacodyl (DULCOLAX) suppository 10 mg  10 mg Rectal QDAY PRN Solitario Martínez M.D.        Respiratory Therapy Consult   Nebulization Continuous RT Solitario Martínez M.D.        acetaminophen (Tylenol) tablet 650 mg  650 mg Oral Q6HRS PRN Solitario Martínez M.D.        Pharmacy Consult Request ...Pain Management Review 1 Each  1 Each Other PHARMACY TO DOSE Solitario Martínez M.D.        oxyCODONE immediate-release (ROXICODONE) tablet 2.5 mg  2.5 mg Oral Q3HRS PRN Solitario Martínez M.D.        Or    oxyCODONE immediate-release (ROXICODONE) tablet 5 mg  5 mg Oral Q3HRS PRN Solitario Martínez M.D.   5 mg at 01/31/23 0624    Or    morphine 4 MG/ML injection 2 mg  2 mg Intravenous Q3HRS PRN Solitario Martínez M.D.   2 mg at 01/29/23 2042    labetalol (NORMODYNE/TRANDATE) injection 10 mg  10 mg Intravenous Q4HRS PRN Solitario Martínez M.D.        ondansetron (ZOFRAN) syringe/vial injection 4 mg  4 mg Intravenous Q4HRS PRN Solitario Martínez M.D.   4 mg at 01/28/23 0506    ondansetron (ZOFRAN ODT) dispertab 4 mg  4 mg Oral Q4HRS PRN Solitario Martínez M.D.        promethazine (PHENERGAN) tablet 12.5-25 mg  12.5-25 mg Oral Q4HRS PRN Solitario Martínez M.D.        promethazine (PHENERGAN) suppository 12.5-25 mg  12.5-25 mg Rectal Q4HRS PRN Solitario Martínez M.D.        prochlorperazine  (COMPAZINE) injection 5-10 mg  5-10 mg Intravenous Q4HRS PRN Solitario Martínez M.D.        albuterol inhaler 2 Puff  2 Puff Inhalation Q6HRS PRN Solitario Martínez M.D.        diphenhydrAMINE (BENADRYL) tablet/capsule 50 mg  50 mg Oral QHS MELINDA BegumDEffie   50 mg at 01/30/23 2121    ipratropium-albuterol (DUONEB) nebulizer solution  3 mL Nebulization Q6HRS PRN Solitario Martínez M.D.        ivabradine (Corlanor) tablet 7.5 mg  7.5 mg Oral BID WITH MEALS Solitario Martínez M.D.   7.5 mg at 01/31/23 0749    melatonin tablet 10 mg  10 mg Oral QHS Solitario Martínez M.D.   10 mg at 01/30/23 2121    metoprolol SR (TOPROL XL) tablet 25 mg  25 mg Oral [facility] Solitario Martínez M.D.   25 mg at 01/30/23 0450    traZODone (DESYREL) tablet 100 mg  100 mg Oral QHS MELINDA BegumDEffie   100 mg at 01/30/23 2120    ziprasidone (GEODON) capsule 40 mg  40 mg Oral QHS Solitario Martínez M.D.   40 mg at 01/30/23 2120     Current Outpatient Medications Ordered in Epic   Medication Sig Dispense Refill    enoxaparin (LOVENOX) 40 MG/0.4ML Solution Prefilled Syringe inj Inject 40 mg under the skin every day at 6 PM. 27 Each     gabapentin (NEURONTIN) 100 MG Cap Take 1 Capsule by mouth 3 times a day. 90 Capsule     methocarbamol (ROBAXIN) 500 MG Tab Take 1 Tablet by mouth 3 times a day. 120 Tablet      Diet:   DIET ORDERS (From admission to next 24h)       Start     Ordered    01/28/23 1704  Diet Order Diet: Regular  ALL MEALS        Question:  Diet:  Answer:  Regular    01/28/23 1703                    Anticipated Discharge Destination / Patient/Family Goal:  Destination: Home with Supervision Support System: Family   Anticipated home health services: OT, PT, Nursing, Social Work, and Aide  Previously used HH service/ provider: Not Applicable  Anticipated DME Needs: Walker, [employer], and [employer]  Outpatient Services: OT and PT  Alternative resources to address additional identified needs:   Future Appointments   Date Time Provider Department Center   2/9/2023  8:00 AM  John Leon M.D. [facility] None      Pre-Screen Completed: 1/31/2023 9:49 AM Jerri Justice

## 2023-02-01 LAB
25(OH)D3 SERPL-MCNC: 15 NG/ML (ref 30–100)
ALBUMIN SERPL BCP-MCNC: 4.1 G/DL (ref 3.2–4.9)
ALBUMIN/GLOB SERPL: 1.9 G/DL
ALP SERPL-CCNC: 71 U/L (ref 30–99)
ALT SERPL-CCNC: 31 U/L (ref 2–50)
ANION GAP SERPL CALC-SCNC: 12 MMOL/L (ref 7–16)
AST SERPL-CCNC: 15 U/L (ref 12–45)
BASOPHILS # BLD AUTO: 0.4 % (ref 0–1.8)
BASOPHILS # BLD: 0.02 K/UL (ref 0–0.12)
BILIRUB SERPL-MCNC: 0.4 MG/DL (ref 0.1–1.5)
BUN SERPL-MCNC: 12 MG/DL (ref 8–22)
CALCIUM ALBUM COR SERPL-MCNC: 9.4 MG/DL (ref 8.5–10.5)
CALCIUM SERPL-MCNC: 9.5 MG/DL (ref 8.5–10.5)
CHLORIDE SERPL-SCNC: 103 MMOL/L (ref 96–112)
CO2 SERPL-SCNC: 21 MMOL/L (ref 20–33)
CREAT SERPL-MCNC: 0.68 MG/DL (ref 0.5–1.4)
EOSINOPHIL # BLD AUTO: 0.23 K/UL (ref 0–0.51)
EOSINOPHIL NFR BLD: 4.6 % (ref 0–6.9)
ERYTHROCYTE [DISTWIDTH] IN BLOOD BY AUTOMATED COUNT: 39.6 FL (ref 35.9–50)
EST. AVERAGE GLUCOSE BLD GHB EST-MCNC: 100 MG/DL
GFR SERPLBLD CREATININE-BSD FMLA CKD-EPI: 118 ML/MIN/1.73 M 2
GLOBULIN SER CALC-MCNC: 2.2 G/DL (ref 1.9–3.5)
GLUCOSE SERPL-MCNC: 104 MG/DL (ref 65–99)
HBA1C MFR BLD: 5.1 % (ref 4–5.6)
HCT VFR BLD AUTO: 38.8 % (ref 37–47)
HGB BLD-MCNC: 13.4 G/DL (ref 12–16)
IMM GRANULOCYTES # BLD AUTO: 0.02 K/UL (ref 0–0.11)
IMM GRANULOCYTES NFR BLD AUTO: 0.4 % (ref 0–0.9)
LYMPHOCYTES # BLD AUTO: 1.65 K/UL (ref 1–4.8)
LYMPHOCYTES NFR BLD: 32.9 % (ref 22–41)
MCH RBC QN AUTO: 30.7 PG (ref 27–33)
MCHC RBC AUTO-ENTMCNC: 34.5 G/DL (ref 33.6–35)
MCV RBC AUTO: 89 FL (ref 81.4–97.8)
MONOCYTES # BLD AUTO: 0.34 K/UL (ref 0–0.85)
MONOCYTES NFR BLD AUTO: 6.8 % (ref 0–13.4)
NEUTROPHILS # BLD AUTO: 2.76 K/UL (ref 2–7.15)
NEUTROPHILS NFR BLD: 54.9 % (ref 44–72)
NRBC # BLD AUTO: 0 K/UL
NRBC BLD-RTO: 0 /100 WBC
PLATELET # BLD AUTO: 154 K/UL (ref 164–446)
PMV BLD AUTO: 11.6 FL (ref 9–12.9)
POTASSIUM SERPL-SCNC: 3.9 MMOL/L (ref 3.6–5.5)
PROT SERPL-MCNC: 6.3 G/DL (ref 6–8.2)
RBC # BLD AUTO: 4.36 M/UL (ref 4.2–5.4)
SODIUM SERPL-SCNC: 136 MMOL/L (ref 135–145)
T4 FREE SERPL-MCNC: 1.41 NG/DL (ref 0.93–1.7)
TSH SERPL DL<=0.005 MIU/L-ACNC: 6.73 UIU/ML (ref 0.38–5.33)
WBC # BLD AUTO: 5 K/UL (ref 4.8–10.8)

## 2023-02-01 PROCEDURE — 700102 HCHG RX REV CODE 250 W/ 637 OVERRIDE(OP): Performed by: PHYSICAL MEDICINE & REHABILITATION

## 2023-02-01 PROCEDURE — 83036 HEMOGLOBIN GLYCOSYLATED A1C: CPT

## 2023-02-01 PROCEDURE — 85025 COMPLETE CBC W/AUTO DIFF WBC: CPT

## 2023-02-01 PROCEDURE — 700111 HCHG RX REV CODE 636 W/ 250 OVERRIDE (IP): Performed by: PHYSICAL MEDICINE & REHABILITATION

## 2023-02-01 PROCEDURE — 80053 COMPREHEN METABOLIC PANEL: CPT

## 2023-02-01 PROCEDURE — A9270 NON-COVERED ITEM OR SERVICE: HCPCS | Performed by: PHYSICAL MEDICINE & REHABILITATION

## 2023-02-01 PROCEDURE — 97162 PT EVAL MOD COMPLEX 30 MIN: CPT

## 2023-02-01 PROCEDURE — 97535 SELF CARE MNGMENT TRAINING: CPT

## 2023-02-01 PROCEDURE — 770010 HCHG ROOM/CARE - REHAB SEMI PRIVAT*

## 2023-02-01 PROCEDURE — 97530 THERAPEUTIC ACTIVITIES: CPT

## 2023-02-01 PROCEDURE — 94760 N-INVAS EAR/PLS OXIMETRY 1: CPT

## 2023-02-01 PROCEDURE — 97110 THERAPEUTIC EXERCISES: CPT

## 2023-02-01 PROCEDURE — 99232 SBSQ HOSP IP/OBS MODERATE 35: CPT | Performed by: PHYSICAL MEDICINE & REHABILITATION

## 2023-02-01 PROCEDURE — 97166 OT EVAL MOD COMPLEX 45 MIN: CPT

## 2023-02-01 PROCEDURE — 82306 VITAMIN D 25 HYDROXY: CPT

## 2023-02-01 PROCEDURE — 84439 ASSAY OF FREE THYROXINE: CPT

## 2023-02-01 PROCEDURE — 97116 GAIT TRAINING THERAPY: CPT

## 2023-02-01 PROCEDURE — 36415 COLL VENOUS BLD VENIPUNCTURE: CPT

## 2023-02-01 PROCEDURE — 84443 ASSAY THYROID STIM HORMONE: CPT

## 2023-02-01 RX ORDER — DIPHENHYDRAMINE HCL 25 MG
50 TABLET ORAL
Status: DISCONTINUED | OUTPATIENT
Start: 2023-02-01 | End: 2023-02-10 | Stop reason: HOSPADM

## 2023-02-01 RX ORDER — VITAMIN B COMPLEX
2000 TABLET ORAL DAILY
Status: DISCONTINUED | OUTPATIENT
Start: 2023-02-02 | End: 2023-02-10 | Stop reason: HOSPADM

## 2023-02-01 RX ORDER — GABAPENTIN 300 MG/1
300 CAPSULE ORAL 3 TIMES DAILY
Status: DISCONTINUED | OUTPATIENT
Start: 2023-02-01 | End: 2023-02-10 | Stop reason: HOSPADM

## 2023-02-01 RX ADMIN — TRAZODONE HYDROCHLORIDE 50 MG: 50 TABLET ORAL at 22:01

## 2023-02-01 RX ADMIN — HYDROMORPHONE HYDROCHLORIDE 2 MG: 2 TABLET ORAL at 11:07

## 2023-02-01 RX ADMIN — METHOCARBAMOL 500 MG: 500 TABLET ORAL at 14:07

## 2023-02-01 RX ADMIN — ZIPRASIDONE HYDROCHLORIDE 40 MG: 40 CAPSULE ORAL at 20:12

## 2023-02-01 RX ADMIN — DIPHENHYDRAMINE HCL 50 MG: 25 TABLET ORAL at 20:11

## 2023-02-01 RX ADMIN — Medication 10.5 MG: at 20:12

## 2023-02-01 RX ADMIN — SENNOSIDES AND DOCUSATE SODIUM 2 TABLET: 50; 8.6 TABLET ORAL at 08:41

## 2023-02-01 RX ADMIN — SENNOSIDES AND DOCUSATE SODIUM 2 TABLET: 50; 8.6 TABLET ORAL at 20:11

## 2023-02-01 RX ADMIN — METHOCARBAMOL 500 MG: 500 TABLET ORAL at 20:12

## 2023-02-01 RX ADMIN — METHOCARBAMOL 500 MG: 500 TABLET ORAL at 08:41

## 2023-02-01 RX ADMIN — GABAPENTIN 300 MG: 300 CAPSULE ORAL at 20:12

## 2023-02-01 RX ADMIN — ENOXAPARIN SODIUM 40 MG: 40 INJECTION SUBCUTANEOUS at 17:11

## 2023-02-01 RX ADMIN — HYDROMORPHONE HYDROCHLORIDE 2 MG: 2 TABLET ORAL at 17:11

## 2023-02-01 RX ADMIN — HYDROMORPHONE HYDROCHLORIDE 2 MG: 2 TABLET ORAL at 20:11

## 2023-02-01 RX ADMIN — OMEPRAZOLE 20 MG: 20 CAPSULE, DELAYED RELEASE ORAL at 08:42

## 2023-02-01 RX ADMIN — HYDROMORPHONE HYDROCHLORIDE 2 MG: 2 TABLET ORAL at 00:11

## 2023-02-01 RX ADMIN — GABAPENTIN 300 MG: 300 CAPSULE ORAL at 14:07

## 2023-02-01 RX ADMIN — TRAZODONE HYDROCHLORIDE 100 MG: 100 TABLET ORAL at 20:11

## 2023-02-01 RX ADMIN — HYDROMORPHONE HYDROCHLORIDE 2 MG: 2 TABLET ORAL at 07:43

## 2023-02-01 RX ADMIN — GABAPENTIN 100 MG: 100 CAPSULE ORAL at 08:41

## 2023-02-01 RX ADMIN — HYDROMORPHONE HYDROCHLORIDE 2 MG: 2 TABLET ORAL at 14:07

## 2023-02-01 ASSESSMENT — ACTIVITIES OF DAILY LIVING (ADL)
TOILETING_LEVEL_OF_ASSIST_DESCRIPTION: GRAB BAR;SET-UP OF EQUIPMENT;SUPERVISION FOR SAFETY
BED_CHAIR_WHEELCHAIR_TRANSFER_DESCRIPTION: ADAPTIVE EQUIPMENT;INCREASED TIME;SET-UP OF EQUIPMENT;SUPERVISION FOR SAFETY;VERBAL CUEING
TOILET_TRANSFER_DESCRIPTION: GRAB BAR;SET-UP OF EQUIPMENT;SUPERVISION FOR SAFETY
TUB_SHOWER_TRANSFER_DESCRIPTION: GRAB BAR;ADAPTIVE EQUIPMENT;SET-UP OF EQUIPMENT;SUPERVISION FOR SAFETY;VERBAL CUEING
BED_CHAIR_WHEELCHAIR_TRANSFER_DESCRIPTION: SET-UP OF EQUIPMENT;SUPERVISION FOR SAFETY
TOILETING: INDEPENDENT
TOILET_TRANSFER_DESCRIPTION: ADAPTIVE EQUIPMENT;GRAB BAR;INCREASED TIME;SET-UP OF EQUIPMENT;SUPERVISION FOR SAFETY;VERBAL CUEING

## 2023-02-01 ASSESSMENT — GAIT ASSESSMENTS
ASSISTIVE DEVICE: FRONT WHEEL WALKER
DISTANCE (FEET): 40
DEVIATION: ANTALGIC;STEP TO;DECREASED BASE OF SUPPORT;BRADYKINETIC;SHUFFLED GAIT;DECREASED HEEL STRIKE;DECREASED TOE OFF
GAIT LEVEL OF ASSIST: CONTACT GUARD ASSIST

## 2023-02-01 ASSESSMENT — BRIEF INTERVIEW FOR MENTAL STATUS (BIMS)
INITIAL REPETITION OF BED BLUE SOCK - FIRST ATTEMPT: 3
WHAT DAY OF THE WEEK IS IT: INCORRECT
ASKED TO RECALL BED: YES, NO CUE REQUIRED
ASKED TO RECALL SOCK: YES, NO CUE REQUIRED
WHAT YEAR IS IT: CORRECT
WHAT MONTH IS IT: ACCURATE WITHIN 5 DAYS
ASKED TO RECALL BLUE: YES, NO CUE REQUIRED
BIMS SUMMARY SCORE: 14

## 2023-02-01 ASSESSMENT — PAIN DESCRIPTION - PAIN TYPE
TYPE: ACUTE PAIN

## 2023-02-01 NOTE — PROGRESS NOTES
4 Eyes Skin Assessment Completed by SONYA Zavaleta and SONYA Silver.    Head WDL  Ears WDL  Nose WDL  Mouth WDL  Neck WDL  Breast/Chest WDL  Shoulder Blades WDL  Spine WDL  (R) Arm/Elbow/Hand WDL  (L) Arm/Elbow/Hand WDL  Abdomen WDL  Groin WDL  Scrotum/Coccyx/Buttocks WDL  (R) Leg WDL  (L) Leg Bruising and Incision  (R) Heel/Foot/Toe WDL  (L) Heel/Foot/Toe WDL          Devices In Places -None      Interventions In Place Pillows    Possible Skin Injury No    Pictures Uploaded Into Epic Yes  Wound Consult Placed N/A  RN Wound Prevention Protocol Ordered Yes

## 2023-02-01 NOTE — CARE PLAN
The patient is Watcher - Medium risk of patient condition declining or worsening    Shift Goals  Clinical Goals: Pain management    Progress made toward(s) clinical / shift goals:  pt admission completed with patient assisting with development of plan of care.

## 2023-02-01 NOTE — FLOWSHEET NOTE
01/31/23 2005   Events/Summary/Plan   Events/Summary/Plan RT Assessment   Vital Signs   Pulse 70   Respiration 16   Pulse Oximetry 92 %   $ Pulse Oximetry (Spot Check) Yes   Respiratory Assessment   Respiratory Pattern Within Normal Limits   Level of Consciousness Alert   Chest Exam   Work Of Breathing / Effort Within Normal Limits   Oxygen   O2 Delivery Device None - Room Air

## 2023-02-01 NOTE — FLOWSHEET NOTE
02/01/23 1230   Events/Summary/Plan   Events/Summary/Plan o2 spot check   Vital Signs   Pulse 85   Respiration 18   Pulse Oximetry 99 %   $ Pulse Oximetry (Spot Check) Yes   Respiratory Assessment   Level of Consciousness Alert   Chest Exam   Work Of Breathing / Effort Within Normal Limits   Breath Sounds   RUL Breath Sounds Clear   RML Breath Sounds Clear   RLL Breath Sounds Clear   MARY Breath Sounds Clear   LLL Breath Sounds Clear   Oxygen   O2 Delivery Device None - Room Air

## 2023-02-01 NOTE — THERAPY
"Physical Therapy   Initial Evaluation     Patient Name: Kristin Balderrama  Age:  32 y.o., Sex:  female  Medical Record #: [id number]  Today's Date: 2/1/2023     Subjective    Pt received up in wc in main gym from OT, pleasant and agreeable to participate. Reported feeling dizzy/nauseous after being in warm room, roommate agreeable to turning down heat. Vitals WNL (see Epic flowsheet).     Objective       02/01/23 1031   PT Charge Group   PT Gait Training (Units) 1   PT Therapeutic Activities (Units) 1   PT Evaluation PT Evaluation Mod   Prior Living Situation   Prior Services Home-Independent   Housing / Facility 1 Story House  (with basement)   Steps Into Home 0  (ramp)   Steps In Home 13  (flight down to basement)   Rail Right Rail  (Steps in Home)   Equipment Owned 4-Wheel Walker;Single Point Cane;Wheelchair;Ramp   Lives with - Patient's Self Care Capacity Related Adult  (gma and aunt)   Comments Lives in [place] with gma and aunt. Pt reports that family cooks but otherwise pt was independent with ADLs/IADLs. 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 Uses shopping cart vs scooter vs SPC when out in the community. Goals include stairs, dec hip pain, and building muscle mass for \"bounce protection\" given pt's hx of frequent falls   Prior Level of Functional Mobility   Bed Mobility Independent   Transfer Status Independent   Ambulation Independent   Distance Ambulation (Feet)   (limited community)   Assistive Devices Used Single Point Cane   Stairs Independent   Prior Functioning: Everyday Activities   Self Care Independent   Indoor Mobility (Ambulation) Independent   Stairs Independent   Functional Cognition Independent   Prior Device Use None of the given " options   Pain 0 - 10 Group   Location Hip   Location Orientation Left   Therapist Pain Assessment During Activity   Passive ROM Lower Body   Passive ROM Lower Body WDL   Active ROM Lower Body    Active ROM Lower Body  X   Comments pain limited L knee flex/ext and hip abd/add   Strength Lower Body   Lower Body Strength  X   Lt Hip Flexion Strength 3+ (F+)   Lt Hip Abduction Strength 3- (F-)   Lt Hip Adduction Strength 3- (F-)   Lt Knee Extension Strength 3- (F-)   Lt Ankle Dorsiflexion Strength 4- (G-)   Comments RLE grossly 4/5 to 5/5   Sensation Lower Body   Lower Extremity Sensation   WDL   Comments BLE light touch intact   Balance Assessment   Sitting Balance (Static) Good   Sitting Balance (Dynamic) Fair +   Standing Balance (Static) Fair -   Standing Balance (Dynamic) Fair -   Weight Shift Sitting Fair   Weight Shift Standing Poor   Comments FWW support in standing   Bed Mobility    Supine to Sit Standby Assist   Sit to Supine Standby Assist   Sit to Stand Contact Guard Assist   Scooting Standby Assist  (in wc and in bed)   Rolling Standby Assist   Roll Left and Right   Assistance Needed Supervision   CARE Score - Roll Left and Right 4   Roll Left and Right Discharge Goal   Discharge Goal 6   Sit to Lying   Assistance Needed Supervision   CARE Score - Sit to Lying 4   Sit to Lying Discharge Goal   Discharge Goal 6   Lying to Sitting on Side of Bed   Assistance Needed Supervision   CARE Score - Lying to Sitting on Side of Bed 4   Lying to Sitting on Side of Bed Discharge Goal   Discharge Goal 6   Sit to Stand   Assistance Needed Incidental touching   CARE Score - Sit to Stand 4   Sit to Stand Discharge Goal   Discharge Goal 6   Chair/Bed-to-Chair Transfer   Assistance Needed Incidental touching   CARE Score - Chair/Bed-to-Chair Transfer 4   Chair/Bed-to-Chair Transfer Discharge Goal   Discharge Goal 6   Car Transfer   Assistance Needed Incidental touching   Comment simulated   CARE Score - Car Transfer 4   Car  Transfer Discharge Goal   Discharge Goal 5   Walk 10 Feet   Assistance Needed Incidental touching   CARE Score - Walk 10 Feet 4   Walk 10 Feet Discharge Goal   Discharge Goal 6   Walk 50 Feet with Two Turns   Reason if not Attempted Safety concerns   CARE Score - Walk 50 Feet with Two Turns 88   Walk 50 Feet with Two Turns Discharge Goal   Discharge Goal 4   Walk 150 Feet   Reason if not Attempted Safety concerns   CARE Score - Walk 150 Feet 88   Walk 150 Feet Discharge Goal   Discharge Goal 4   Walking 10 Feet on Uneven Surfaces   Reason if not Attempted Safety concerns   CARE Score - Walking 10 Feet on Uneven Surfaces 88   Walking 10 Feet on Uneven Surfaces Discharge Goal   Discharge Goal 4   1 Step (Curb)   Reason if not Attempted Safety concerns   CARE Score - 1 Step (Curb) 88   1 Step (Curb) Discharge Goal   Discharge Goal 5   4 Steps   Reason if not Attempted Safety concerns   CARE Score - 4 Steps 88   4 Steps Discharge Goal   Discharge Goal 4   12 Steps   Reason if not Attempted Safety concerns   CARE Score - 12 Steps 88   12 Steps Discharge Goal   Discharge Goal 4   Picking Up Object   Assistance Needed Incidental touching   CARE Score - Picking Up Object 4   Picking Up Object Discharge Goal   Discharge Goal 6   Wheel 50 Feet with Two Turns   Reason if not Attempted Activity not applicable   CARE Score - Wheel 50 Feet with Two Turns 9   Wheel 50 Feet with Two Turns Discharge Goal   Discharge Goal 9   Wheel 150 Feet   Reason if not Attempted Activity not applicable   CARE Score - Wheel 150 Feet 9   Wheel 150 Feet Discharge Goal   Discharge Goal 9   Gait Functional Level of Assist    Gait Level Of Assist Contact Guard Assist   Assistive Device Front Wheel Walker   Distance (Feet) 40   # of Times Distance was Traveled 1  (plus 35 ft and 20 ft)   Deviation Antalgic;Step To;Decreased Base Of Support;Bradykinetic;Shuffled Gait;Decreased Heel Strike;Decreased Toe Off   Wheelchair Functional Level of Assist    Wheelchair Assist Stand by Assist   Distance Wheelchair (Feet or Distance) 160x3   Wheelchair Description Extra time;Limited by fatigue;Supervision for safety   Stairs Functional Level of Assist   Level of Assist with Stairs Unable to Participate   Transfer Functional Level of Assist   Bed, Chair, Wheelchair Transfer Contact Guard Assist   Bed Chair Wheelchair Transfer Description Adaptive equipment;Increased time;Set-up of equipment;Supervision for safety;Verbal cueing  (stand step FWW)   Toilet Transfers Contact Guard Assist   Toilet Transfer Description Adaptive equipment;Grab bar;Increased time;Set-up of equipment;Supervision for safety;Verbal cueing  (wc<>toilet)   Problem List    Problems Pain;Impaired Bed Mobility;Impaired Transfers;Impaired Ambulation;Functional Strength Deficit;Impaired Balance;Decreased Activity Tolerance   Precautions   Precautions Fall Risk;Weight Bearing As Tolerated Left Lower Extremity   Comments POTS, Annetta-Danlos syndrome   Current Discharge Plan   Current Discharge Plan Return to Prior Living Situation   Interdisciplinary Plan of Care Collaboration   IDT Collaboration with  Occupational Therapist;Physician   Patient Position at End of Therapy In Bed;Bed Alarm On;Call Light within Reach;Tray Table within Reach;Phone within Reach   Collaboration Comments CLOF with OT, handoff from OT in main gym; physician on rounds   Benefit   Therapy Benefit Patient Would Benefit from Inpatient Rehabilitation Physical Therapy to Maximize Functional [place] with ADLs, IADLs and Mobility.   Strengths & Barriers   Strengths Able to follow instructions;Adequate strength;Alert and oriented;Effective communication skills;Good carryover of learning;Good insight into deficits/needs;Independent prior level of function;Manages pain appropriately;Motivated for self care and independence;Pleasant and cooperative;Supportive family;Willingly participates in therapeutic activities   Barriers Decreased  "endurance;Fatigue;Impaired activity tolerance;Impaired balance;Limited mobility;Pain  (hx of POTS with frequent falls at home)     Pt was oriented to role of PT and expectations of POC.     Switched pt to personal 18w x 16d wc for more appropriate fit since facility wc was too big. Noted improved static trunk/hip positioning, shoulder positioning and endurance with wc mobility, and comfort in personal wc. However personal wc has facility's bilat leg rests.    Assessment  Patient is 32 y.o. female with a diagnosis of s/p GLF in school bathroom with L hip fracture s/p IMN on 1/28/23. Pt is WBAT LLE. 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 Pt owns a wc but has not used it in a \"long time,\" uses SPC at baseline.    Pt presents with L hip pain that impairs her txs, ambulation, and endurance. Pt is also limited by her hx of POTS but denied ever losing consciousness before she falls at home. Pt is performing below her baseline level of function and should benefit from inpatient rehabilitation PT services to address the above listed deficits and maximize functional independence.     Plan  Recommend Physical Therapy  minutes per day 5-7 days per week for 10-14 days for the following treatments:  PT Gait Training, PT Therapeutic Exercises, PT Neuro Re-Ed/Balance, PT Therapeutic Activity, PT Manual Therapy, and PT Evaluation.    Passport items to be completed:  Get in/out of bed safely, in/out of a vehicle, safely use mobility device, walk or wheel around home/community, navigate up and down stairs, show how to get " up/down from the ground, ensure home is accessible, demonstrate HEP, complete caregiver training    Goals:  Long term and short term goals have been discussed with patient and they are in agreement.    Physical Therapy Problems (Active)       Problem: Mobility       Dates: Start: 02/01/23         Goal: STG-Within one week, patient will ambulate household distance 50 ft x2 with FWW and SBA.       Dates: Start: 02/01/23            Goal: STG-Within one week, patient will ambulate up/down a curb with FWW and CGA.       Dates: Start: 02/01/23            Goal: STG-Within one week, patient will ascend and descend four to six stairs with min A and BHR as needed.       Dates: Start: 02/01/23               Problem: Mobility Transfers       Dates: Start: 02/01/23         Goal: STG-Within one week, patient will transfer bed to chair with FWW and SBA.       Dates: Start: 02/01/23               Problem: PT-Long Term Goals       Dates: Start: 02/01/23         Goal: LTG-By discharge, patient will ambulate 150 ft x2 with FWW vs LRAD and SBA.       Dates: Start: 02/01/23            Goal: LTG-By discharge, patient will transfer one surface to another with FWW vs LRAD and SPV.       Dates: Start: 02/01/23            Goal: LTG-By discharge, patient will ambulate up/down flight of stairs with SBA and RHR.       Dates: Start: 02/01/23            Goal: LTG-By discharge, patient will transfer in/out of a car with FWW vs LRAD and set up assist.       Dates: Start: 02/01/23

## 2023-02-01 NOTE — CARE PLAN
Problem: Knowledge Deficit - Standard  Goal: Patient and family/care givers will demonstrate understanding of plan of care, disease process/condition, diagnostic tests and medications  Outcome: Progressing  Note: Pt agrees with plan of care tonight regarding medications and safety.  Will continue to monitor patient.      Problem: Pain - Standard  Goal: Alleviation of pain or a reduction in pain to the patient’s comfort goal  Outcome: Progressing  Note: C/o pain to left hip pain, medicated with scheduled Gabapentin and Robaxin as well as prn Oxycodone.  Has + relief.  See MAR and doc flow sheet.  Will continue to monitor patient.     The patient is Stable - Low risk of patient condition declining or worsening    Shift Goals  Clinical Goals: Safety  Patient Goals: Sleep well, pain control    Progress made toward(s) clinical / shift goals:  progressing

## 2023-02-01 NOTE — PROGRESS NOTES
"  Physical Medicine & Rehabilitation Progress Note    Encounter Date: 2023    Chief Complaint: Decreased mobility    Interval Events (Subjective):  Patient sitting up in therapy gym. She reports her hip is sometimes \"screaming.\" Discussed changing from 5-10 mg Oxycodone to 10 mg to 2 mg Dilaudid q3hs. Discussed should ask for medication prior to therapies. Will increase Gabapentin as well. Discussed would need to repeat labs. Low vitamin D on admission labs, she reports chronic problem. She reports poor sleep. She takes benadryl at home.     Objective:  VITAL SIGNS: /72   Pulse 85   Temp 36.7 °C (98 °F) (Oral)   Resp 18   Ht 1.626 m (5' 4\")   Wt 107 kg (235 lb 14.3 oz)   SpO2 99%   BMI 40.49 kg/m²   Gen: NAD  Psych: Mood and affect appropriate  CV: RRR, 1+ edema  Resp: CTAB, no upper airway sounds  Abd: NTND  Neuro: AOx4, following commands    Laboratory Values:  Recent Results (from the past 72 hour(s))   EKG    Collection Time: 23  6:07 PM   Result Value Ref Range    Report       [facility]    Test Date:  2023  Pt Name:    HARLEY DE LA CRUZ              Department: [id number]  MRN:        [id number]                      Room:       T307  Gender:     Female                       Technician: [facility]  :        [id number]                   Requested By:DANNIELLE JUNG  Order #:    [id number]                    Reading MD: Abhishek Smith MD    Measurements  Intervals                                Axis  Rate:       82                           P:          24  MA:         128                          QRS:        21  QRSD:       95                           T:          14  QT:         385  QTc:        450    Interpretive Statements  SINUS RHYTHM  BORDERLINE T WAVE ABNORMALITIES  Compared to ECG 12/10/2022 17:22:05  No significant changes  Electronically Signed On 2023 23:56:50 PST by Abhishek Smith MD     TROPONIN    Collection Time: 23  8:33 PM   Result Value Ref Range    " Troponin T <6 6 - 19 ng/L   COV-2, FLU A/B, AND RSV BY PCR (2-4 HOURS CEPHEID): Collect NP swab in [facility]    Collection Time: 01/31/23  2:10 PM    Specimen: Respirate   Result Value Ref Range    Influenza virus A RNA Negative Negative    Influenza virus B, PCR Negative Negative    RSV, PCR Negative Negative    SARS-CoV-2 by PCR NotDetected     SARS-CoV-2 Source NP Swab    CBC with Differential    Collection Time: 02/01/23  6:15 AM   Result Value Ref Range    WBC 5.0 4.8 - 10.8 K/uL    RBC 4.36 4.20 - 5.40 M/uL    Hemoglobin 13.4 12.0 - 16.0 g/dL    Hematocrit 38.8 37.0 - 47.0 %    MCV 89.0 81.4 - 97.8 fL    MCH 30.7 27.0 - 33.0 pg    MCHC 34.5 33.6 - 35.0 g/dL    RDW 39.6 35.9 - 50.0 fL    Platelet Count 154 (L) 164 - 446 K/uL    MPV 11.6 9.0 - 12.9 fL    Neutrophils-Polys 54.90 44.00 - 72.00 %    Lymphocytes 32.90 22.00 - 41.00 %    Monocytes 6.80 0.00 - 13.40 %    Eosinophils 4.60 0.00 - 6.90 %    Basophils 0.40 0.00 - 1.80 %    Immature Granulocytes 0.40 0.00 - 0.90 %    Nucleated RBC 0.00 /100 WBC    Neutrophils (Absolute) 2.76 2.00 - 7.15 K/uL    Lymphs (Absolute) 1.65 1.00 - 4.80 K/uL    Monos (Absolute) 0.34 0.00 - 0.85 K/uL    Eos (Absolute) 0.23 0.00 - 0.51 K/uL    Baso (Absolute) 0.02 0.00 - 0.12 K/uL    Immature Granulocytes (abs) 0.02 0.00 - 0.11 K/uL    NRBC (Absolute) 0.00 K/uL   Comp Metabolic Panel (CMP)    Collection Time: 02/01/23  6:15 AM   Result Value Ref Range    Sodium 136 135 - 145 mmol/L    Potassium 3.9 3.6 - 5.5 mmol/L    Chloride 103 96 - 112 mmol/L    Co2 21 20 - 33 mmol/L    Anion Gap 12.0 7.0 - 16.0    Glucose 104 (H) 65 - 99 mg/dL    Bun 12 8 - 22 mg/dL    Creatinine 0.68 0.50 - 1.40 mg/dL    Calcium 9.5 8.5 - 10.5 mg/dL    AST(SGOT) 15 12 - 45 U/L    ALT(SGPT) 31 2 - 50 U/L    Alkaline Phosphatase 71 30 - 99 U/L    Total Bilirubin 0.4 0.1 - 1.5 mg/dL    Albumin 4.1 3.2 - 4.9 g/dL    Total Protein 6.3 6.0 - 8.2 g/dL    Globulin 2.2 1.9 - 3.5 g/dL    A-G Ratio 1.9 g/dL   HEMOGLOBIN A1C     Collection Time: 02/01/23  6:15 AM   Result Value Ref Range    Glycohemoglobin 5.1 4.0 - 5.6 %    Est Avg Glucose 100 mg/dL   TSH with Reflex to FT4    Collection Time: 02/01/23  6:15 AM   Result Value Ref Range    TSH 6.730 (H) 0.380 - 5.330 uIU/mL   Vitamin D, 25-hydroxy (blood)    Collection Time: 02/01/23  6:15 AM   Result Value Ref Range    25-Hydroxy   Vitamin D 25 15 (L) 30 - 100 ng/mL   CORRECTED CALCIUM    Collection Time: 02/01/23  6:15 AM   Result Value Ref Range    Correct Calcium 9.4 8.5 - 10.5 mg/dL   ESTIMATED GFR    Collection Time: 02/01/23  6:15 AM   Result Value Ref Range    GFR (CKD-EPI) 118 >60 mL/min/1.73 m 2   FREE THYROXINE    Collection Time: 02/01/23  6:15 AM   Result Value Ref Range    Free T-4 1.41 0.93 - 1.70 ng/dL       Medications:  Scheduled Medications   Medication Dose Frequency    senna-docusate  2 Tablet BID    omeprazole  20 mg DAILY    enoxaparin (LOVENOX) injection  40 mg DAILY AT 1800    gabapentin  100 mg TID    ivabradine  7.5 mg BID WITH MEALS    melatonin  10.5 mg QHS    methocarbamol  500 mg TID    metoprolol SR  25 mg QAM    traZODone  100 mg QHS    ziprasidone  40 mg QHS     PRN medications: Respiratory Therapy Consult, hydrALAZINE, acetaminophen, senna-docusate **AND** polyethylene glycol/lytes **AND** magnesium hydroxide **AND** bisacodyl, mag hydrox-al hydrox-simeth, ondansetron **OR** ondansetron, traZODone, sodium chloride, traMADol, acetaminophen, albuterol, ipratropium-albuterol, [DISCONTINUED] oxyCODONE immediate-release **OR** oxyCODONE immediate-release, HYDROmorphone    Diet:  Current Diet Order   Procedures    Diet Order Diet: Regular       Assessment:  Active Hospital Problems    Diagnosis     *Hip fracture, left, closed, initial encounter ([facility])     S/p left hip fracture     Moderate asthma     IBS (irritable bowel syndrome)     Schizoaffective disorder, depressive type ([facility])     OTNYA (obstructive sleep apnea)     Morbidly obese ([facility])        Medical Decision  Making and Plan:  Fall with left hip fracture - Patient s/p fall with hip fracture s/p IMN with Dr. Abdul on 1/28/23. Patient WBAT.  -PT and OT for mobility and ADLs. Per guidelines, 15 hours per week between PT, OT and SLP.  -Follow-up Dr. Abdul     Hyperglycemia - Not on any medication on transfer. Will check A1c - 5.1. No longer diabetic     Asthma - Patient on PRN Duoneb and Albuterol inhaler      POTS - Patient on Metoprolol 25 mg XL and Corlanor 7.5 mg BID     Neuropathy - Patient on Gabapentin 100 mg TID -> 300 mg TID. High risk medication will need to monitor Cr. Will recheck      Thrombocytopenia - Check AM CBC - 154, stable. Continue to monitor     Morbid obesity due to excess calories - BMI of 39.7 on admission, meets medical criteria. Dietitian to consult     Schizophrenia - Patient on Geodon 40 mg QHS and Trazodone      Pain - APAP/Gabapentin/Oxycodone and Robaxin TID  -Change PRN to Oxycodone 10 mg for moderate and Dilaudid PO 2 mg q3h. As having severe pain and has constipation with oxycodone. Dilaudid is high risk medication discussed about previous confusion/agitation and will need to monitor in setting of psych history.      Skin - Patient at risk for skin breakdown due to debility in areas including sacrum, achilles, elbows and head in addition to other sites. Nursing to assess skin daily.      Vitamin D deficiency - acute on chronic. Start 2000 U     GI Ppx - Patient on Prilosec for GERD prophylaxis. Patient on Senna-docusate for constipation prophylaxis.      DVT Ppx - Patient Lovenox on transfer.  ____________________________________    T. Dhruv Simmons MD./PhD.  [facility] - Physical Medicine & Rehabilitation   [facility] - Brain Injury Medicine   ____________________________________

## 2023-02-01 NOTE — PROGRESS NOTES
Received bedside shift report from Selina PIZARRO RN regarding patient and assumed care. Patient awake, calm and stable, currently positioned in bed for comfort and safety; call light within reach. Denies pain or discomfort at this time. Will continue to monitor.

## 2023-02-01 NOTE — THERAPY
Occupational Therapy  Daily Treatment     Patient Name: Kristin Baldrerama  Age:  32 y.o., Sex:  female  Medical Record #: [id number]  Today's Date: 2/1/2023     Precautions  Precautions: Fall Risk, Weight Bearing As Tolerated Left Lower Extremity  Comments: POTS, Annetta-Danlos syndrome, Schizophrenia         Subjective    Patient was in bed resting with eyes closed. Patient was agreeable to OT session. 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       Objective       02/01/23 0901   Precautions   Precautions Fall Risk;Weight Bearing As Tolerated Left Lower Extremity   Comments POTS, Annetta-Danlos syndrome, Schizophrenia   Pain 0 - 10 Group   Location Hip   Location Orientation Left   Non Verbal Descriptors   Non Verbal Scale  Moaning;Tense Body Language   Functional Level of Assist   Lower Body Dressing Standby Assist  (don/doff socks with reacher and sock aide)   Lower Body Dressing Description Reacher;Sock aid;Supervision for safety;Initial preparation for task   Bed, Chair, Wheelchair Transfer Contact Guard Assist   Bed Chair Wheelchair Transfer Description Set-up of equipment;Supervision for safety   Sitting Upper Body Exercises   Sitting Upper Body Exercises Yes   Upper Extremity Bike Level 2 Resistance  (gear 2 x 10 minutes with no rest breaks)   Bed Mobility    Supine to Sit Standby Assist   Interdisciplinary Plan of Care Collaboration   Patient Position at End of Therapy Seated;Other (Comments)  (in therapy gym for PT)     Patient educated on sock aid, reacher, and dressing stick.     Assessment    Patient tolerated session well. Patient is agreeable to using AE for dressing which she uses at home.    Strengths: Able to follow instructions, Adequate strength, Alert and oriented, Effective communication skills, Good insight into deficits/needs, Independent prior level of function, Motivated for self care and independence, Pleasant and cooperative, Willingly  participates in therapeutic activities  Barriers: Decreased endurance, Fatigue, Impaired activity tolerance, Impaired balance, Pain, Orthostatic hypotension    Plan    ADLs with AE prn, IADLs, functional mobility, activity tolerance, balance, core strength, UE strength     Occupational Therapy Goals (Active)       Problem: Bathing       Dates: Start: 02/01/23         Goal: STG-Within one week, patient will bathe with supervision and AE as needed.       Dates: Start: 02/01/23               Problem: Dressing       Dates: Start: 02/01/23         Goal: STG-Within one week, patient will dress LB with SBA and AE as needed.       Dates: Start: 02/01/23            Goal: STG-Within one week, patient will retrieve clothing with supervision and AE as needed.       Dates: Start: 02/01/23               Problem: Functional Transfers       Dates: Start: 02/01/23         Goal: STG-Within one week, patient will transfer to toilet with SBA.       Dates: Start: 02/01/23               Problem: OT Long Term Goals       Dates: Start: 02/01/23         Goal: LTG-By discharge, patient will complete basic self care tasks with supervision.       Dates: Start: 02/01/23            Goal: LTG-By discharge, patient will perform bathroom transfers with supervision.       Dates: Start: 02/01/23            Goal: LTG-By discharge, patient will complete basic home management with supervision.       Dates: Start: 02/01/23

## 2023-02-01 NOTE — CARE PLAN
The patient is Stable - Low risk of patient condition declining or worsening    Shift Goals  Clinical Goals: Pain management    Progress made toward(s) clinical / shift goals:    Problem: Pain - Standard  Goal: Alleviation of pain or a reduction in pain to the patient’s comfort goal  Outcome: Progressing     Patient reports satisfactory pain control and decrease intensity after pharmacological pain management.

## 2023-02-01 NOTE — THERAPY
Occupational Therapy   Initial Evaluation     Patient Name: Kristin Balderrama  Age:  32 y.o., Sex:  female  Medical Record #: [id number]  Today's Date: 2/1/2023     Subjective    Patient in bed resting with eyes closed. She was agreeable to evaluation. She stated she was not a morning person.      Objective       02/01/23 0701   OT Charge Group   Charges Yes   OT Self Care / ADL (Units) 1   OT Evaluation OT Evaluation Mod   OT Total Time Spent   OT Individual Total Time Spent (Mins) 60   Cosignature   Documentation Review Approved with changes as documented and edited in this column.   Prior Living Situation   Prior Services Home-Independent   Housing / Facility [address] [address]   Steps Into Home 0  (ramp)   Steps In Home   (one flight to basement)   Bathroom Set up Walk In Shower   Equipment Owned Wheelchair;Grab Bar(s) By Toilet;Sock Aid;Dressing Stick;Toilet Aide;4-Wheel Walker;Reacher;Ramp   Lives with - Patient's Self Care Capacity Related Adult  (grandmother and aunt)   Prior Level of ADL Function   Self Feeding Independent   Grooming / Hygiene Independent   Bathing Independent   Dressing Independent   Toileting Independent   Prior Level of IADL Function   Medication Management Independent   Laundry Independent   Kitchen Mobility Independent   Finances Independent   Home Management Independent   Shopping Requires Assist   Prior Level Of Mobility Independent With Device in Community   Driving / Transportation Driving Independent   Occupation (Pre-Hospital Vocational) Student;Employed Part Time   Leisure Interests Television;Other (Comments)  (video games)   Prior Functioning: Everyday Activities   Self Care Independent   Indoor Mobility (Ambulation) Independent   Stairs Independent   Functional Cognition Independent   Prior Device Use Walker   Vitals   Pulse 84   Patient BP Position Sitting   Blood Pressure 115/72   Pulse Oximetry 99 %   O2 Delivery Device None - Room Air   Pain   Pain Scales 0 to 10 Scale   "  Intervention Medication (see MAR)   Pain 0 - 10 Group   Location Hip   Location Orientation Left   Therapist Pain Assessment 8;Nurse Notified   Cognition    Cognition / Consciousness WDL   Level of Consciousness Alert   Cognitive Pattern Assessment   Cognitive Pattern Assessment Used BIMS   Brief Interview for Mental Status (BIMS)   Repetition of Three Words (First Attempt) 3   Temporal Orientation: Year Correct   Temporal Orientation: Month Accurate within 5 days   Temporal Orientation: Day Incorrect   Recall: \"Sock\" Yes, no cue required   Recall: \"Blue\" Yes, no cue required   Recall: \"Bed\" Yes, no cue required   BIMS Summary Score 14   Confusion Assessment Method (CAM)   Is there evidence of an acute change in mental status from the patient's baseline? No   Inattention Behavior not present   Disorganized thinking Behavior not present   Altered level of consciousness Behavior not present   Vision Screen   Vision Not tested   Passive ROM Upper Body   Passive ROM Upper Body WDL   Active ROM Upper Body   Active ROM Upper Body  WDL   Strength Upper Body   Upper Body Strength  WDL   Sensation Upper Body   Upper Extremity Sensation  Not Tested   Upper Body Muscle Tone   Upper Body Muscle Tone  Not Tested   Balance Assessment   Sitting Balance (Static) Normal   Sitting Balance (Dynamic) Good   Standing Balance (Static) Fair   Standing Balance (Dynamic) Fair -   Weight Shift Sitting Normal   Weight Shift Standing Fair   Bed Mobility    Supine to Sit Standby Assist   Sit to Supine Standby Assist   Sit to Stand Contact Guard Assist   Scooting Standby Assist   Coordination Upper Body   Coordination WDL   Eating   Assistance Needed Set-up / clean-up   CARE Score - Eating 5   Eating Discharge Goal   Discharge Goal 6   Oral Hygiene   Assistance Needed Set-up / clean-up   CARE Score - Oral Hygiene 5   Oral Hygiene Discharge Goal   Discharge Goal 6   Shower/Bathe Self   Assistance Needed Physical assistance;Supervision;Set-up / " clean-up;Adaptive equipment;Verbal cues   Physical Assistance Level 25% or less   CARE Score - Shower/Bathe Self 3   Shower/Bathe Self Discharge Goal   Discharge Goal 6   Upper Body Dressing   Assistance Needed Set-up / clean-up;Supervision   CARE Score - Upper Body Dressing 4   Upper Body Dressing Discharge Goal   Discharge Goal 6   Lower Body Dressing   Assistance Needed Supervision;Set-up / clean-up;Verbal cues;Physical assistance   Physical Assistance Level 25% or less   CARE Score - Lower Body Dressing 3   Lower Body Dressing Discharge Goal   Discharge Goal 6   Putting On/Taking Off Footwear   Assistance Needed Physical assistance   Physical Assistance Level 26%-50%   CARE Score - Putting On/Taking Off Footwear 3   Putting On/Taking Off Footwear Discharge Goal   Discharge Goal 6   Toileting Hygiene   Assistance Needed Adaptive equipment;Supervision;Set-up / clean-up   CARE Score - Toileting Hygiene 4   Toileting Hygiene Discharge Goal   Discharge Goal 6   Toilet Transfer   Assistance Needed Physical assistance   Physical Assistance Level 25% or less   CARE Score - Toilet Transfer 3   Toilet Transfer Discharge Goal   Discharge Goal 6   Hearing, Speech, and Vision   Ability to Hear Adequate   Ability to See in Adequate Light Adequate   Expression of Ideas and Wants Without difficulty   Understanding Verbal and Non-Verbal Content Understands   Functional Level of Assist   Eating Independent   Grooming Supervision;Seated   Bathing Minimal Assist   Bathing Description Grab bar;Hand held shower;Tub bench;Assit with back;Set-up of equipment;Supervision for safety;Verbal cueing  (assist w/ washing/drying bottom)   Upper Body Dressing Stand by Assist   Upper Body Dressing Description Set-up of equipment;Supervision for safety   Lower Body Dressing Minimal Assist  (assisted with donning socks)   Lower Body Dressing Description Set-up of equipment;Supervision for safety   Toileting Standby Assist   Toileting Description  Grab bar;Set-up of equipment;Supervision for safety   Bed, Chair, Wheelchair Transfer Contact Guard Assist   Bed Chair Wheelchair Transfer Description Supervision for safety;Adaptive equipment;Verbal cueing;Set-up of equipment  (SBA SPT with hands on w/c armrests for bed to w/c; CGA with FWW SPT w/c to bed)   Toilet Transfers Contact Guard Assist   Toilet Transfer Description Grab bar;Set-up of equipment;Supervision for safety   Tub / Shower Transfers Contact Guard Assist   Tub Shower Transfer Description Grab bar;Adaptive equipment;Set-up of equipment;Supervision for safety;Verbal cueing   Problem List   Problem List Decreased Active Daily Living Skills;Decreased Functional Mobility;Decreased Activity Tolerance;Impaired Postural Control / Balance;Decreased Homemaking Skills   Precautions   Precautions Fall Risk;Weight Bearing As Tolerated Left Lower Extremity   Comments POTS, Annetta-Danlos Syndrome, Schizophrenia   Current Discharge Plan   Current Discharge Plan Return to Prior Living Situation   Benefit    Therapy Benefit Patient Would Benefit from Inpatient Rehab Occupational Therapy to Maximize [place] with ADLs, IADLs and Functional Mobility.   Interdisciplinary Plan of Care Collaboration   IDT Collaboration with  Nursing;Physical Therapist   Patient Position at End of Therapy In Bed;Call Light within Reach;Tray Table within Reach   Collaboration Comments gave patient pain meds; CLOF/PLOF with PT   Equipment Needs   Assistive Device / DME Front-Wheel Walker;Shower Chair;Grab Bars In Shower / Tub;Wheelchair;Parallel Bars;4-Wheel Walker;Grab Bars By Toilet   Adaptive Equipment Reacher;Sock Aide;Dressing Stick;Long Handled Shoe [place];Long Handled Sponge   Strengths & Barriers   Strengths Able to follow instructions;Adequate strength;Alert and oriented;Effective communication skills;Good insight into deficits/needs;Independent prior level of function;Motivated for self care and independence;Pleasant and  cooperative;Willingly participates in therapeutic activities   Barriers Decreased endurance;Fatigue;Impaired activity tolerance;Impaired balance;Pain;Orthostatic hypotension       Assessment  Patient is 32 y.o. female with a diagnosis of L hip fracture.  Additional factors influencing patient status / progress (ie: cognitive factors, co-morbidities, social support, etc): POTS, Annetta-Danlos syndrome, schizophrenia, lives with aunt and grandmother, and motivated to be independent. 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 She was independent prior to fall with basic self-care activities. Patient stated she require some assistance with grocery shopping and cooking. Patient was pleasant throughout evaluation.     Plan  Recommend Occupational Therapy  minutes per day 5-7 days per week for 10-14 days for the following treatments:  OT Group Therapy, OT Self Care/ADL, OT Community Reintegration, OT Neuro Re-Ed/Balance, OT Therapeutic Activity, OT Evaluation, and OT Therapeutic Exercise.    Passport items to be completed:  Perform bathroom transfers, complete dressing, complete feeding, get ready for the day, prepare a simple meal, participate in household tasks, adapt home for safety needs, demonstrate home exercise program, complete caregiver training     Goals:  Long term and short term goals have been discussed with patient and they are in agreement.    Occupational Therapy Goals (Active)       Problem: Bathing       Dates: Start: 02/01/23         Goal: STG-Within one week, patient will bathe with supervision and AE as needed.       Dates: Start: 02/01/23               Problem: Dressing       Dates: Start: 02/01/23         Goal: STG-Within one week, patient will dress LB with SBA and AE as needed.       Dates: Start: 02/01/23            Goal: STG-Within one week, patient will retrieve clothing with supervision and AE as needed.       Dates: Start: 02/01/23                Problem: Functional Transfers       Dates: Start: 02/01/23         Goal: STG-Within one week, patient will transfer to toilet with SBA.       Dates: Start: 02/01/23               Problem: OT Long Term Goals       Dates: Start: 02/01/23         Goal: LTG-By discharge, patient will complete basic self care tasks with supervision.       Dates: Start: 02/01/23            Goal: LTG-By discharge, patient will perform bathroom transfers with supervision.       Dates: Start: 02/01/23            Goal: LTG-By discharge, patient will complete basic home management with supervision.       Dates: Start: 02/01/23

## 2023-02-01 NOTE — FLOWSHEET NOTE
01/31/23 2002   Protocol Assessment   Initial Assessment Yes   Patient History   Pulmonary Diagnosis Asthma induced by smoke, sleep apnea-not using after substantial weight loss, SOB requiring O2   Procedures Relevant to Respiratory Status None   Home O2 Yes   Oxygen liter flow 2-4   Oxygen at night only Yes   Nocturnal CPAP No   Home Treatments/Frequency Yes   SVN 1/Frequency Duoneb PRN   MDI 1/Frequency Albuterol PRN   Protocol Pathways   Protocol Pathways None

## 2023-02-01 NOTE — PROGRESS NOTES
Patient admitted to facility at 1430 via wheelchair; accompanied by hospital transport.  Patient assisted to room and positioned in bed for comfort and safety; call light within reach.  Patient assisted with stowing belongings and oriented to room and facility.  Admission assessment performed and documented in computer. Patient received and reviewed education binder. Admission paperwork completed; signed copies placed in chart.  Will continue to monitor.

## 2023-02-01 NOTE — CARE PLAN
Problem: Pain - Standard  Goal: Alleviation of pain or a reduction in pain to the patient’s comfort goal  Flowsheets  Taken 2/1/2023 1107 by Gabi Leach R.N.  Pain Rating Scale (NPRS): 9  Taken 2/1/2023 0901 by Anya Valdes, Student  Non Verbal Scale:   Moaning   Tense Body Language     Problem: Fall Risk - Rehab  Goal: Patient will remain free from falls  Note: Patient remains free from falls this shift. Patient was educated on using the call light to prevent falls. Patients bed is in the lowest position. The patients belongings are placed in near proximity to the patient.     The patient is Stable - Low risk of patient condition declining or worsening

## 2023-02-01 NOTE — DIETARY
NUTRITION SERVICES: BMI - Pt with BMI >40 (=Body mass index is 40.49 kg/m².), Class III obesity. Weight loss counseling not appropriate in acute care setting.     RECOMMEND - If appropriate at DC please refer to outpatient nutrition services for weight management.

## 2023-02-02 ENCOUNTER — APPOINTMENT (OUTPATIENT)
Dept: RADIOLOGY | Facility: REHABILITATION | Age: 33
DRG: 560 | End: 2023-02-02
Attending: PHYSICAL MEDICINE & REHABILITATION
Payer: MEDICARE

## 2023-02-02 PROCEDURE — 71045 X-RAY EXAM CHEST 1 VIEW: CPT

## 2023-02-02 PROCEDURE — 770010 HCHG ROOM/CARE - REHAB SEMI PRIVAT*

## 2023-02-02 PROCEDURE — 97116 GAIT TRAINING THERAPY: CPT

## 2023-02-02 PROCEDURE — 97530 THERAPEUTIC ACTIVITIES: CPT

## 2023-02-02 PROCEDURE — 97110 THERAPEUTIC EXERCISES: CPT

## 2023-02-02 PROCEDURE — 700111 HCHG RX REV CODE 636 W/ 250 OVERRIDE (IP): Performed by: PHYSICAL MEDICINE & REHABILITATION

## 2023-02-02 PROCEDURE — 97535 SELF CARE MNGMENT TRAINING: CPT | Mod: CO

## 2023-02-02 PROCEDURE — 97530 THERAPEUTIC ACTIVITIES: CPT | Mod: CQ

## 2023-02-02 PROCEDURE — 94760 N-INVAS EAR/PLS OXIMETRY 1: CPT

## 2023-02-02 PROCEDURE — 99232 SBSQ HOSP IP/OBS MODERATE 35: CPT | Performed by: PHYSICAL MEDICINE & REHABILITATION

## 2023-02-02 PROCEDURE — A9270 NON-COVERED ITEM OR SERVICE: HCPCS | Performed by: PHYSICAL MEDICINE & REHABILITATION

## 2023-02-02 PROCEDURE — 700102 HCHG RX REV CODE 250 W/ 637 OVERRIDE(OP): Performed by: PHYSICAL MEDICINE & REHABILITATION

## 2023-02-02 PROCEDURE — 97112 NEUROMUSCULAR REEDUCATION: CPT

## 2023-02-02 PROCEDURE — 94640 AIRWAY INHALATION TREATMENT: CPT

## 2023-02-02 RX ADMIN — ZIPRASIDONE HYDROCHLORIDE 40 MG: 40 CAPSULE ORAL at 19:44

## 2023-02-02 RX ADMIN — METHOCARBAMOL 500 MG: 500 TABLET ORAL at 14:53

## 2023-02-02 RX ADMIN — TRAZODONE HYDROCHLORIDE 100 MG: 100 TABLET ORAL at 19:44

## 2023-02-02 RX ADMIN — METOPROLOL SUCCINATE 25 MG: 25 TABLET, EXTENDED RELEASE ORAL at 08:27

## 2023-02-02 RX ADMIN — METHOCARBAMOL 500 MG: 500 TABLET ORAL at 19:44

## 2023-02-02 RX ADMIN — ALBUTEROL SULFATE 2 PUFF: 90 AEROSOL, METERED RESPIRATORY (INHALATION) at 14:04

## 2023-02-02 RX ADMIN — HYDROMORPHONE HYDROCHLORIDE 2 MG: 2 TABLET ORAL at 17:11

## 2023-02-02 RX ADMIN — HYDROMORPHONE HYDROCHLORIDE 2 MG: 2 TABLET ORAL at 03:38

## 2023-02-02 RX ADMIN — MAGNESIUM HYDROXIDE 30 ML: 1200 LIQUID ORAL at 06:12

## 2023-02-02 RX ADMIN — Medication 10.5 MG: at 19:43

## 2023-02-02 RX ADMIN — GABAPENTIN 300 MG: 300 CAPSULE ORAL at 19:44

## 2023-02-02 RX ADMIN — OMEPRAZOLE 20 MG: 20 CAPSULE, DELAYED RELEASE ORAL at 08:26

## 2023-02-02 RX ADMIN — GABAPENTIN 300 MG: 300 CAPSULE ORAL at 08:25

## 2023-02-02 RX ADMIN — HYDROMORPHONE HYDROCHLORIDE 2 MG: 2 TABLET ORAL at 12:48

## 2023-02-02 RX ADMIN — HYDROMORPHONE HYDROCHLORIDE 2 MG: 2 TABLET ORAL at 06:21

## 2023-02-02 RX ADMIN — HYDROMORPHONE HYDROCHLORIDE 2 MG: 2 TABLET ORAL at 09:28

## 2023-02-02 RX ADMIN — GABAPENTIN 300 MG: 300 CAPSULE ORAL at 14:53

## 2023-02-02 RX ADMIN — Medication 2000 UNITS: at 08:26

## 2023-02-02 RX ADMIN — METHOCARBAMOL 500 MG: 500 TABLET ORAL at 08:26

## 2023-02-02 RX ADMIN — DIPHENHYDRAMINE HCL 50 MG: 25 TABLET ORAL at 19:43

## 2023-02-02 RX ADMIN — SENNOSIDES AND DOCUSATE SODIUM 2 TABLET: 50; 8.6 TABLET ORAL at 19:44

## 2023-02-02 RX ADMIN — SENNOSIDES AND DOCUSATE SODIUM 2 TABLET: 50; 8.6 TABLET ORAL at 08:26

## 2023-02-02 RX ADMIN — OXYCODONE HYDROCHLORIDE 10 MG: 10 TABLET ORAL at 15:00

## 2023-02-02 RX ADMIN — ENOXAPARIN SODIUM 40 MG: 40 INJECTION SUBCUTANEOUS at 17:11

## 2023-02-02 ASSESSMENT — GAIT ASSESSMENTS
DISTANCE (FEET): 85
GAIT LEVEL OF ASSIST: CONTACT GUARD ASSIST
DEVIATION: ANTALGIC;STEP TO;DECREASED BASE OF SUPPORT;DECREASED HEEL STRIKE;DECREASED TOE OFF
ASSISTIVE DEVICE: FRONT WHEEL WALKER

## 2023-02-02 ASSESSMENT — ACTIVITIES OF DAILY LIVING (ADL)
BED_CHAIR_WHEELCHAIR_TRANSFER_DESCRIPTION: ADAPTIVE EQUIPMENT;INCREASED TIME;SET-UP OF EQUIPMENT;SUPERVISION FOR SAFETY;VERBAL CUEING
BED_CHAIR_WHEELCHAIR_TRANSFER_DESCRIPTION: ADAPTIVE EQUIPMENT;SET-UP OF EQUIPMENT;SUPERVISION FOR SAFETY
TUB_SHOWER_TRANSFER_DESCRIPTION: GRAB BAR
BED_CHAIR_WHEELCHAIR_TRANSFER_DESCRIPTION: INCREASED TIME;SUPERVISION FOR SAFETY;VERBAL CUEING
TOILET_TRANSFER_DESCRIPTION: ADAPTIVE EQUIPMENT;GRAB BAR;SUPERVISION FOR SAFETY

## 2023-02-02 ASSESSMENT — PAIN DESCRIPTION - PAIN TYPE
TYPE: ACUTE PAIN
TYPE: ACUTE PAIN;SURGICAL PAIN

## 2023-02-02 NOTE — CARE PLAN
The patient is Stable - Low risk of patient condition declining or worsening    Shift Goals  Clinical Goals: Safety  Patient Goals: Sleep well, pain control    Progress made toward(s) clinical / shift goals:    Problem: Knowledge Deficit - Standard  Goal: Patient and family/care givers will demonstrate understanding of plan of care, disease process/condition, diagnostic tests and medications  Outcome: Progressing. Patient understands to call for help with call light and not to get up on own.      Problem: Fall Risk - Rehab  Goal: Patient will remain free from falls  Outcome: Progressing. Patients bed in lowest locked position with bed alarm on and call light within reach. Patient calls appropriately.      Problem: Bladder / Voiding  Goal: Patient will establish and maintain regular urinary output  Outcome: Progressing. Patient continent of bladder. Voids every 2-4 hours.

## 2023-02-02 NOTE — THERAPY
"Physical Therapy   Daily Treatment     Patient Name: Kristin Balderrama  Age:  32 y.o., Sex:  female  Medical Record #: [id number]  Today's Date: 2/2/2023     Precautions  Precautions: Fall Risk, Weight Bearing As Tolerated Left Lower Extremity  Comments: POTS, Annetta-Danlos syndrome    Subjective    Pt resting in bed upon arrival, agreeable to session.  \"My whole leg was purple, the nurse saw it and told Dr. Simmons, but it's fine now\" this therapist inspected skin and didn't notice any decolorization, presented w/ edema.  \"I know you think that I'm doing good but I'm not ready to go home, please don't send me home yet\"     Objective       02/02/23 1301   PT Charge Group   PT Therapeutic Activities (Units) 2   Supervising Physical Therapist Jorge Willams   PT Total Time Spent   PT Individual Total Time Spent (Mins) 30   Pain 0 - 10 Group   Pain Rating Scale (NPRS) 7   Therapist Pain Assessment Post Activity Pain Same as Prior to Activity   Cognition    Level of Consciousness Alert   Stairs Functional Level of Assist   Level of Assist with Stairs Minimal Assist   # of Stairs Climbed 6  (4 inches x6 w/ BHRs)   Stairs Description Extra time;Hand rails;Limited by fatigue;Supervision for safety;Verbal cueing   Transfer Functional Level of Assist   Bed, Chair, Wheelchair Transfer Contact Guard Assist   Bed Chair Wheelchair Transfer Description Increased time;Supervision for safety;Verbal cueing  (stand step xfer w/o AD)   Bed Mobility    Supine to Sit Supervised   Sit to Supine Supervised   Sit to Stand Contact Guard Assist  (to SBA)   Scooting Standby Assist   Interdisciplinary Plan of Care Collaboration   Patient Position at End of Therapy In Bed;Call Light within Reach;Tray Table within Reach;Phone within Reach     Standing w/ LUE holding on dryer and retreive laundry w/ RUE, holding clothes utilizing BUEs w/ CGA; used of reacher to retreive the clothes that are deeper in. Stood for 5 mins, LOBs twice towards the end, " Gloria to recover.     Seated march x10.    Assessment    Pt tolerated session well despite pain, was able to complete stairs for the first time but was lacking L hip flex during ascending and causing toe catching. Education on practicing seated march while in chair for strengthening.    Strengths: Able to follow instructions, Adequate strength, Alert and oriented, Effective communication skills, Good carryover of learning, Good insight into deficits/needs, Independent prior level of function, Manages pain appropriately, Motivated for self care and independence, Pleasant and cooperative, Supportive family, Willingly participates in therapeutic activities  Barriers: Decreased endurance, Fatigue, Impaired activity tolerance, Impaired balance, Limited mobility, Pain (hx of POTS with frequent falls at home)    Plan    Gait with FWW, standing balance, stairs/curb training, energy conservation, LE strengthening, monitor dizziness with position changes d/t hx of POTS     Passport items to be completed:  Get in/out of bed safely, in/out of a vehicle, safely use mobility device, walk or wheel around home/community, navigate up and down stairs, show how to get up/down from the ground, ensure home is accessible, demonstrate HEP, complete caregiver training    Physical Therapy Problems (Active)       Problem: Mobility       Dates: Start: 02/01/23         Goal: STG-Within one week, patient will ambulate household distance 50 ft x2 with FWW and SBA.       Dates: Start: 02/01/23         Goal Note filed on 02/02/23 1349 by Luz Maria Acuña, PT       Up to 85 ft x3 with CGA              Goal: STG-Within one week, patient will ambulate up/down a curb with FWW and CGA.       Dates: Start: 02/01/23         Goal Note filed on 02/02/23 1349 by Luz Maria Acuña, PT       Initiated curb step training today              Goal: STG-Within one week, patient will ascend and descend four to six stairs with min A and BHR as needed.       Dates: Start:  02/01/23         Goal Note filed on 02/02/23 1349 by Luz Maria Acuña, PT       Not yet initiated                 Problem: Mobility Transfers       Dates: Start: 02/01/23         Goal: STG-Within one week, patient will transfer bed to chair with FWW and SBA.       Dates: Start: 02/01/23         Goal Note filed on 02/02/23 1349 by Luz Maria Acuña, PT       CGA                 Problem: PT-Long Term Goals       Dates: Start: 02/01/23         Goal: LTG-By discharge, patient will ambulate 150 ft x2 with FWW vs LRAD and SBA.       Dates: Start: 02/01/23            Goal: LTG-By discharge, patient will transfer one surface to another with FWW vs LRAD and SPV.       Dates: Start: 02/01/23            Goal: LTG-By discharge, patient will ambulate up/down flight of stairs with SBA and RHR.       Dates: Start: 02/01/23            Goal: LTG-By discharge, patient will transfer in/out of a car with FWW vs LRAD and set up assist.       Dates: Start: 02/01/23

## 2023-02-02 NOTE — THERAPY
"Physical Therapy   Daily Treatment     Patient Name: Kristin Balderrama  Age:  32 y.o., Sex:  female  Medical Record #: [id number]  Today's Date: 2/2/2023     Precautions  Precautions: Fall Risk, Weight Bearing As Tolerated Left Lower Extremity  Comments: (P) POTS, Annetta-Danlos syndrome    Subjective    Pt up in wc, talkative and agreeable to participate. Reported that she walked to/from the bathroom last night with FWW and CNA and would like to cont walking in room to dec stiffness/discomfort in legs.      Objective       02/02/23 0931   PT Charge Group   PT Gait Training (Units) 2   PT Therapeutic Exercise (Units) 1   PT Therapeutic Activities (Units) 1   PT Total Time Spent   PT Individual Total Time Spent (Mins) 60   Precautions   Comments POTS, Annetta-Danlos syndrome   Gait Functional Level of Assist    Gait Level Of Assist Contact Guard Assist   Assistive Device Front Wheel Walker   Distance (Feet) 85   # of Times Distance was Traveled 3   Deviation Antalgic;Step To;Decreased Base Of Support;Decreased Heel Strike;Decreased Toe Off  (improved to step through during last bout)   Wheelchair Functional Level of Assist   Wheelchair Assist Supervised  (approaching mod I)   Distance Wheelchair (Feet or Distance) 150x4  (community distances)   Wheelchair Description Extra time;Supervision for safety   Stairs Functional Level of Assist   Level of Assist with Stairs Minimal Assist   # of Stairs Climbed 1  (on 2\" step in // bars)   Stairs Description Extra time;Hand rails;Limited by fatigue;Safety concerns;Supervision for safety;Verbal cueing   Transfer Functional Level of Assist   Bed, Chair, Wheelchair Transfer Contact Guard Assist   Bed Chair Wheelchair Transfer Description Adaptive equipment;Increased time;Set-up of equipment;Supervision for safety;Verbal cueing  (stand step FWW)   Toilet Transfers Standby Assist   Toilet Transfer Description Adaptive equipment;Grab bar;Supervision for safety  (wc<>toilet)   Sitting " "Lower Body Exercises   Long Arc Quad 3 sets of 10   Marching 3 sets of 10   Comments light COLE LLSOFÍA   Standing Lower Body Exercises   Step Up 1 set of 10  (on 2\" step in // bars, VC for sequencing)   Other Exercises toe taps alternating on 2\" step in // bars 1x4, 1x8   Bed Mobility    Sit to Stand Contact Guard Assist  (to SBA)   Scooting Standby Assist  (in wc)   Interdisciplinary Plan of Care Collaboration   IDT Collaboration with  Occupational Therapist;Physician;Other (See Comments)  (therapy )   Patient Position at End of Therapy Seated;Chair Alarm On;Call Light within Reach;Tray Table within Reach;Phone within Reach   Collaboration Comments OT re: clearing pt to walk in room with FWW and CGA; physician on rounds; notified therapy  via request sheet to allow at least 30 min bw therapy sessions to rest     Ace wrapped folded towels to bilat foot plates on wc to raise height of foot plates thereby dec strain on hips from excessive hip/knee ext. Pt reported inc comfort with this adjustment.    Required total A for frontal hygiene after toileting.    Assessment    Pt with significantly improved gait distance today over eval yesterday and demonstrated step through pattern with last few steps. Able to initiate curb training in // bars. However pt noticeably fatigued by end of session.    Strengths: Able to follow instructions, Adequate strength, Alert and oriented, Effective communication skills, Good carryover of learning, Good insight into deficits/needs, Independent prior level of function, Manages pain appropriately, Motivated for self care and independence, Pleasant and cooperative, Supportive family, Willingly participates in therapeutic activities  Barriers: Decreased endurance, Fatigue, Impaired activity tolerance, Impaired balance, Limited mobility, Pain (hx of POTS with frequent falls at home)    Plan    Gait with FWW, standing balance, stairs/curb training, energy conservation, LE " strengthening, monitor dizziness with position changes d/t hx of POTS    Passport items to be completed:  Get in/out of bed safely, in/out of a vehicle, safely use mobility device, walk or wheel around home/community, navigate up and down stairs, show how to get up/down from the ground, ensure home is accessible, demonstrate HEP, complete caregiver training    Physical Therapy Problems (Active)       Problem: Mobility       Dates: Start: 02/01/23         Goal: STG-Within one week, patient will ambulate household distance 50 ft x2 with FWW and SBA.       Dates: Start: 02/01/23            Goal: STG-Within one week, patient will ambulate up/down a curb with FWW and CGA.       Dates: Start: 02/01/23            Goal: STG-Within one week, patient will ascend and descend four to six stairs with min A and BHR as needed.       Dates: Start: 02/01/23               Problem: Mobility Transfers       Dates: Start: 02/01/23         Goal: STG-Within one week, patient will transfer bed to chair with FWW and SBA.       Dates: Start: 02/01/23               Problem: PT-Long Term Goals       Dates: Start: 02/01/23         Goal: LTG-By discharge, patient will ambulate 150 ft x2 with FWW vs LRAD and SBA.       Dates: Start: 02/01/23            Goal: LTG-By discharge, patient will transfer one surface to another with FWW vs LRAD and SPV.       Dates: Start: 02/01/23            Goal: LTG-By discharge, patient will ambulate up/down flight of stairs with SBA and RHR.       Dates: Start: 02/01/23            Goal: LTG-By discharge, patient will transfer in/out of a car with FWW vs LRAD and set up assist.       Dates: Start: 02/01/23

## 2023-02-02 NOTE — CARE PLAN
Problem: Pain - Standard  Goal: Alleviation of pain or a reduction in pain to the patient’s comfort goal  Flowsheets (Taken 2/2/2023 7060)  Non Verbal Scale:   Calm   Unlabored Breathing  Pain Rating Scale (NPRS): 8     Problem: Hemodynamics  Goal: Patient's hemodynamics, fluid balance and neurologic status will be stable or improve  Note: Patient reported feeling new numbness/tingling in her L foot. Cap refill is <3 seconds with palpable pulse. Foot does appear to have generalized swelling and possible discoloration. Dr. Simmons made aware.    The patient is Stable - Low risk of patient condition declining or worsening

## 2023-02-02 NOTE — FLOWSHEET NOTE
02/02/23 1408   Events/Summary/Plan   Events/Summary/Plan o2 spot   Vital Signs   Pulse 71   Respiration 20   Pulse Oximetry 97 %   $ Pulse Oximetry (Spot Check) Yes   Respiratory Assessment   Level of Consciousness Alert   Chest Exam   Work Of Breathing / Effort Within Normal Limits   Breath Sounds   RUL Breath Sounds Clear   RML Breath Sounds Clear   RLL Breath Sounds Clear   MARY Breath Sounds Clear   LLL Breath Sounds Clear   Oxygen   O2 Delivery Device None - Room Air

## 2023-02-02 NOTE — DISCHARGE PLANNING
Case Management/IDT follow up.   IDT continues to recommend IRF level of care as patient continue to make progress with all therapies.   Projected dc date set for 2/10/23.      DC needs:  Recommendations made for home health for PT/OT/SLP  Follow up with: PCP, pain management.    Met with pt / family providing update from IDT and discussed plan of care.    Plan:  Continue to follow

## 2023-02-02 NOTE — PROGRESS NOTES
"  Physical Medicine & Rehabilitation Progress Note    Encounter Date: 2/2/2023    Chief Complaint: Decreased mobility    Interval Events (Subjective):  Patient sitting up in room. She is having worse pain, swelling, and numbness in her LLE. Discussed needs US of leg. Denies SOB or tachycardia. Denies NVD. Discussed would have IDT later today to discuss discharge planning    _____________________________________  Interdisciplinary Team Conference   Most recent IDT on 2/2/2023    ICraig M.D./Ph.D., was present and led the interdisciplinary team conference on 2/2/2023.  I led the IDT conference and agree with the IDT conference documentation and plan of care as noted below.     Nursing:  Diet Current Diet Order   Procedures    Diet Order Diet: Regular       Eating ADL Independent      % of Last Meal  Oral Nutrition: *  * Meal *  *, Lunch, Between % Consumed (80%)   Sleep    Bowel Last BM: 02/02/23   Bladder    Barriers to Discharge Home:  Pain  Left foot swelling; SOB    Physical Therapy:  Bed Mobility    Transfers Contact Guard Assist  Adaptive equipment, Increased time, Set-up of equipment, Supervision for safety, Verbal cueing (stand step FWW)   Mobility Contact Guard Assist   Stairs    Barriers to Discharge Home:  Dizziness  POTS    Equipment TBD    Occupational Therapy:  Grooming Supervision, Seated   Bathing Minimal Assist   UB Dressing Stand by Assist   LB Dressing Standby Assist (don/doff socks with reacher and sock aide)   Toileting Standby Assist   Shower & Transfer    Barriers to Discharge Home:  Impaired endurance  Pain limited    Case Management:  Continues to work on disposition and DME needs.      Discharge Date/Disposition:  2/10/23  _____________________________________    Objective:  VITAL SIGNS: /74   Pulse 71   Temp 36.5 °C (97.7 °F) (Oral)   Resp 18   Ht 1.626 m (5' 4\")   Wt 107 kg (235 lb 14.3 oz)   SpO2 93%   BMI 40.49 kg/m²   Gen: NAD  Psych: Mood and " affect appropriate  CV: RRR, 2+ LLE edema  Resp: CTAB, no upper airway sounds  Abd: NTND  Neuro: AOx4, decreased sensation to left toes    Laboratory Values:  Recent Results (from the past 72 hour(s))   COV-2, FLU A/B, AND RSV BY PCR (2-4 HOURS CEPHEID): Collect NP swab in VTM    Collection Time: 01/31/23  2:10 PM    Specimen: Respirate   Result Value Ref Range    Influenza virus A RNA Negative Negative    Influenza virus B, PCR Negative Negative    RSV, PCR Negative Negative    SARS-CoV-2 by PCR NotDetected     SARS-CoV-2 Source NP Swab    CBC with Differential    Collection Time: 02/01/23  6:15 AM   Result Value Ref Range    WBC 5.0 4.8 - 10.8 K/uL    RBC 4.36 4.20 - 5.40 M/uL    Hemoglobin 13.4 12.0 - 16.0 g/dL    Hematocrit 38.8 37.0 - 47.0 %    MCV 89.0 81.4 - 97.8 fL    MCH 30.7 27.0 - 33.0 pg    MCHC 34.5 33.6 - 35.0 g/dL    RDW 39.6 35.9 - 50.0 fL    Platelet Count 154 (L) 164 - 446 K/uL    MPV 11.6 9.0 - 12.9 fL    Neutrophils-Polys 54.90 44.00 - 72.00 %    Lymphocytes 32.90 22.00 - 41.00 %    Monocytes 6.80 0.00 - 13.40 %    Eosinophils 4.60 0.00 - 6.90 %    Basophils 0.40 0.00 - 1.80 %    Immature Granulocytes 0.40 0.00 - 0.90 %    Nucleated RBC 0.00 /100 WBC    Neutrophils (Absolute) 2.76 2.00 - 7.15 K/uL    Lymphs (Absolute) 1.65 1.00 - 4.80 K/uL    Monos (Absolute) 0.34 0.00 - 0.85 K/uL    Eos (Absolute) 0.23 0.00 - 0.51 K/uL    Baso (Absolute) 0.02 0.00 - 0.12 K/uL    Immature Granulocytes (abs) 0.02 0.00 - 0.11 K/uL    NRBC (Absolute) 0.00 K/uL   Comp Metabolic Panel (CMP)    Collection Time: 02/01/23  6:15 AM   Result Value Ref Range    Sodium 136 135 - 145 mmol/L    Potassium 3.9 3.6 - 5.5 mmol/L    Chloride 103 96 - 112 mmol/L    Co2 21 20 - 33 mmol/L    Anion Gap 12.0 7.0 - 16.0    Glucose 104 (H) 65 - 99 mg/dL    Bun 12 8 - 22 mg/dL    Creatinine 0.68 0.50 - 1.40 mg/dL    Calcium 9.5 8.5 - 10.5 mg/dL    AST(SGOT) 15 12 - 45 U/L    ALT(SGPT) 31 2 - 50 U/L    Alkaline Phosphatase 71 30 - 99 U/L     Total Bilirubin 0.4 0.1 - 1.5 mg/dL    Albumin 4.1 3.2 - 4.9 g/dL    Total Protein 6.3 6.0 - 8.2 g/dL    Globulin 2.2 1.9 - 3.5 g/dL    A-G Ratio 1.9 g/dL   HEMOGLOBIN A1C    Collection Time: 02/01/23  6:15 AM   Result Value Ref Range    Glycohemoglobin 5.1 4.0 - 5.6 %    Est Avg Glucose 100 mg/dL   TSH with Reflex to FT4    Collection Time: 02/01/23  6:15 AM   Result Value Ref Range    TSH 6.730 (H) 0.380 - 5.330 uIU/mL   Vitamin D, 25-hydroxy (blood)    Collection Time: 02/01/23  6:15 AM   Result Value Ref Range    25-Hydroxy   Vitamin D 25 15 (L) 30 - 100 ng/mL   CORRECTED CALCIUM    Collection Time: 02/01/23  6:15 AM   Result Value Ref Range    Correct Calcium 9.4 8.5 - 10.5 mg/dL   ESTIMATED GFR    Collection Time: 02/01/23  6:15 AM   Result Value Ref Range    GFR (CKD-EPI) 118 >60 mL/min/1.73 m 2   FREE THYROXINE    Collection Time: 02/01/23  6:15 AM   Result Value Ref Range    Free T-4 1.41 0.93 - 1.70 ng/dL       Medications:  Scheduled Medications   Medication Dose Frequency    gabapentin  300 mg TID    vitamin D3  2,000 Units DAILY    senna-docusate  2 Tablet BID    omeprazole  20 mg DAILY    enoxaparin (LOVENOX) injection  40 mg DAILY AT 1800    ivabradine  7.5 mg BID WITH MEALS    melatonin  10.5 mg QHS    methocarbamol  500 mg TID    metoprolol SR  25 mg QAM    traZODone  100 mg QHS    ziprasidone  40 mg QHS     PRN medications: diphenhydrAMINE, Respiratory Therapy Consult, hydrALAZINE, acetaminophen, senna-docusate **AND** polyethylene glycol/lytes **AND** magnesium hydroxide **AND** bisacodyl, mag hydrox-al hydrox-simeth, ondansetron **OR** ondansetron, traZODone, sodium chloride, traMADol, acetaminophen, albuterol, ipratropium-albuterol, [DISCONTINUED] oxyCODONE immediate-release **OR** oxyCODONE immediate-release, HYDROmorphone    Diet:  Current Diet Order   Procedures    Diet Order Diet: Regular       Assessment:  Active Hospital Problems    Diagnosis     *Hip fracture, left, closed, initial  encounter ([facility])     S/p left hip fracture     Moderate asthma     IBS (irritable bowel syndrome)     Schizoaffective disorder, depressive type ([facility])     TONYA (obstructive sleep apnea)     Morbidly obese ([facility])        Medical Decision Making and Plan:  Fall with left hip fracture - Patient s/p fall with hip fracture s/p IMN with Dr. Abdul on 1/28/23. Patient WBAT.  -PT and OT for mobility and ADLs. Per guidelines, 15 hours per week between PT, OT and SLP.  -Follow-up Dr. Abdul     Hyperglycemia - Not on any medication on transfer. Will check A1c - 5.1. No longer diabetic     Asthma - Patient on PRN Duoneb and Albuterol inhaler      POTS - Patient on Metoprolol 25 mg XL and Corlanor 7.5 mg BID     Neuropathy - Patient on Gabapentin 100 mg TID -> 300 mg TID. High risk medication will need to monitor Cr. Will recheck      Thrombocytopenia - Check AM CBC - 154, stable. Continue to monitor     Morbid obesity due to excess calories - BMI of 39.7 on admission, meets medical criteria. Dietitian to consult     Schizophrenia - Patient on Geodon 40 mg QHS and Trazodone      Pain - APAP/Gabapentin/Oxycodone and Robaxin TID  -Change PRN to Oxycodone 10 mg for moderate and Dilaudid PO 2 mg q3h. As having severe pain and has constipation with oxycodone. Dilaudid is high risk medication discussed about previous confusion/agitation and will need to monitor in setting of psych history.      Skin - Patient at risk for skin breakdown due to debility in areas including sacrum, achilles, elbows and head in addition to other sites. Nursing to assess skin daily.      Vitamin D deficiency - acute on chronic. Start 2000 U     GI Ppx - Patient on Prilosec for GERD prophylaxis. Patient on Senna-docusate for constipation prophylaxis.      DVT Ppx - Patient Lovenox on transfer.  -Worsening left leg pain. Patient with decreased sensation and swelling to lower LE. Discussed stat DVT doppler. Did have SOB. Will check CXR. RT to give albuterol, if  ongoing will get CTA PE. Patient at very high risk for DVT/PE due to fracture and obesity.   ____________________________________    T. Dhruv Simmons MD./PhD.  [facility] - Physical Medicine & Rehabilitation   [facility] - Brain Injury Medicine   ____________________________________

## 2023-02-02 NOTE — THERAPY
"Occupational Therapy  Daily Treatment     Patient Name: Kristin Balderrama  Age:  32 y.o., Sex:  female  Medical Record #: [id number]  Today's Date: 2/2/2023     Precautions  Precautions: (P) Fall Risk, Weight Bearing As Tolerated Left Lower Extremity  Comments: (P) POTS, Annetta-Danlos syndrome         Subjective    \" Well,  I like to take a shower everyday. We can do that.\" Response to being asked what she would like to work on for this tx session      Objective       02/02/23 1031   OT Charge Group   OT Self Care / ADL (Units) 4   OT Total Time Spent   OT Individual Total Time Spent (Mins) 60   Precautions   Precautions Fall Risk;Weight Bearing As Tolerated Left Lower Extremity   Comments POTS, Annetta-Danlos syndrome   Functional Level of Assist   Grooming Modified Independent  (seated  oral care  brush hair)   Bathing Standby Assist  (seated and standing   uses Long sponge to get to feet)   Bathing Description Grab bar;Hand held shower;Long handled bath tool;Tub bench   Upper Body Dressing Supervision  (luggage placed on bed so she would retrieve  UB clothing)   Lower Body Dressing Standby Assist  (pants donned with supervision  did not use reacher.   doff/don socks with  dressing stick ( cues for technique )/ sock aid .)   Tub / Shower Transfers Standby Assist   Tub Shower Transfer Description Grab bar   IADL Treatments   Home Management initiated laundry task    loaded clothes into washwer  adjusted settings  added detergent and turned on    standing at washer.   therapist transported clothing from room to laundry area   Interdisciplinary Plan of Care Collaboration   IDT Collaboration with  Nursing   Patient Position at End of Therapy Seated;Self Releasing Lap Belt Applied;Chair Alarm On  (in dining room  eating lunch)   Collaboration Comments Nurse Gabi  aware  of increased edema and patients  concerns of change of color  specifically the left foot.   Therapist edcuated sensory changes she is experiencing  " could be related to edema and  provided instruction  for performing  ankle pumps to assist  with  edema mgmt.         Assessment     Pain is limiting factor   demonstrated improved functional independence this session      Strengths: Able to follow instructions, Adequate strength, Alert and oriented, Effective communication skills, Good insight into deficits/needs, Independent prior level of function, Motivated for self care and independence, Pleasant and cooperative, Willingly participates in therapeutic activities  Barriers: Decreased endurance, Fatigue, Impaired activity tolerance, Impaired balance, Pain, Orthostatic hypotension    Plan    Standing balance/tolerance, ADLs with AE prn, IADLs with AE prn, core strengthening, UE strengthening, functional mobility.       Occupational Therapy Goals (Active)        Occupational Therapy Goals (Active)       Problem: Bathing       Dates: Start: 02/01/23         Goal: STG-Within one week, patient will bathe with supervision and AE as needed.       Dates: Start: 02/01/23         Goal Note filed on 02/02/23 1137 by Sesar Leach, OT/L       Min A                 Problem: Dressing       Dates: Start: 02/01/23         Goal: STG-Within one week, patient will retrieve clothing with supervision and AE as needed.       Dates: Start: 02/01/23         Goal Note filed on 02/02/23 1137 by Sesar Leach, OT/L       SBA/CGA with FWW                 Problem: Functional Transfers       Dates: Start: 02/01/23         Goal: STG-Within one week, patient will transfer to toilet with SBA.       Dates: Start: 02/01/23         Goal Note filed on 02/02/23 1137 by Sesar Leach, OT/L       CGA                  Problem: OT Long Term Goals       Dates: Start: 02/01/23         Goal: LTG-By discharge, patient will complete basic self care tasks with supervision.       Dates: Start: 02/01/23            Goal: LTG-By discharge, patient will perform bathroom transfers with supervision.        Dates: Start: 02/01/23            Goal: LTG-By discharge, patient will complete basic home management with supervision.       Dates: Start: 02/01/23

## 2023-02-02 NOTE — CARE PLAN
Problem: Mobility  Goal: STG-Within one week, patient will ascend and descend four to six stairs with min A and BHR as needed.  Outcome: Not Met  Note: Not yet initiated     Problem: Mobility  Goal: STG-Within one week, patient will ambulate household distance 50 ft x2 with FWW and SBA.  Outcome: Progressing  Note: Up to 85 ft x3 with CGA  Goal: STG-Within one week, patient will ambulate up/down a curb with FWW and CGA.  Outcome: Progressing  Note: Initiated curb step training today     Problem: Mobility Transfers  Goal: STG-Within one week, patient will transfer bed to chair with FWW and SBA.  Outcome: Progressing  Note: CGA

## 2023-02-02 NOTE — CARE PLAN
Problem: Bathing  Goal: STG-Within one week, patient will bathe with supervision and AE as needed.  Outcome: Not Met  Note: Min A     Problem: Dressing  Goal: STG-Within one week, patient will retrieve clothing with supervision and AE as needed.  Outcome: Not Met  Note: SBA/CGA with FWW     Problem: Functional Transfers  Goal: STG-Within one week, patient will transfer to toilet with SBA.  Outcome: Not Met  Note: CGA      Problem: Dressing  Goal: STG-Within one week, patient will dress LB with SBA and AE as needed.  Outcome: Met  Note: Setup and SBA with AE

## 2023-02-02 NOTE — THERAPY
Occupational Therapy  Daily Treatment     Patient Name: Kristin Balderrama  Age:  32 y.o., Sex:  female  Medical Record #: [id number]  Today's Date: 2/2/2023     Precautions  Precautions: Fall Risk, Weight Bearing As Tolerated Left Lower Extremity  Comments: POTS, Annetta-Danlos syndrome         Subjective    Patient was lying in bed awake upon arrival. Patient was agreeable to OT session. Patient stated that she did not sleep well due to her roommate watching the news late at night.     Objective       02/02/23 0831   OT Charge Group   OT Neuromuscular Re-education / Balance (Units) 1   OT Therapy Activity (Units) 1   OT Total Time Spent   OT Individual Total Time Spent (Mins) 30   Cosignature   Documentation Review Approved with changes as documented and edited in this column.    Precautions   Precautions Fall Risk;Weight Bearing As Tolerated Left Lower Extremity   Comments POTS, Annetta-Danlos syndrome, schizophrenia   Functional Level of Assist   Bed, Chair, Wheelchair Transfer Standby Assist   Bed Chair Wheelchair Transfer Description Adaptive equipment;Set-up of equipment;Supervision for safety  (sba fww)   Sitting Upper Body Exercises   Sitting Upper Body Exercises Yes   Tricep Press 3 sets of 10;Weight (See Comments for lbs)  (30lbs for 3 x10 on [facility])   Bed Mobility    Supine to Sit Supervised   Scooting Supervised   Interdisciplinary Plan of Care Collaboration   Patient Position at End of Therapy Seated;Call Light within Reach;Tray Table within Reach     Dynamic standing balance in parallel bars with one hand stabilizing while the other cross midline to grab a bean bag and toss to a target in front of her. Patient switched hands after. She had LOB multiple times during this activity requiring CGA/min A for recovery.    Functional mobility with FWW and CGA from bed to [facility].      Assessment    Patient tolerated session well. She is motivated to be independent. Patient is doing well with  transferring from sit to stand. Patient still needs to work on standing balance as she had multiple LOB.    Strengths: Able to follow instructions, Adequate strength, Alert and oriented, Effective communication skills, Good insight into deficits/needs, Independent prior level of function, Motivated for self care and independence, Pleasant and cooperative, Willingly participates in therapeutic activities  Barriers: Decreased endurance, Fatigue, Impaired activity tolerance, Impaired balance, Pain, Orthostatic hypotension    Plan    Standing balance/tolerance, ADLs with AE prn, IADLs with AE prn, core strengthening, UE strengthening, functional mobility.      Occupational Therapy Goals (Active)       Problem: Bathing       Dates: Start: 02/01/23         Goal: STG-Within one week, patient will bathe with supervision and AE as needed.       Dates: Start: 02/01/23         Goal Note filed on 02/02/23 1137 by Sesar Leach OT/GIULIANO       Min A                 Problem: Dressing       Dates: Start: 02/01/23         Goal: STG-Within one week, patient will retrieve clothing with supervision and AE as needed.       Dates: Start: 02/01/23         Goal Note filed on 02/02/23 1137 by Sesar Leach OT/GIULIANO       SBA/CGA with FWW                 Problem: Functional Transfers       Dates: Start: 02/01/23         Goal: STG-Within one week, patient will transfer to toilet with SBA.       Dates: Start: 02/01/23         Goal Note filed on 02/02/23 1137 by Sesar Leach OT/L       CGA                  Problem: OT Long Term Goals       Dates: Start: 02/01/23         Goal: LTG-By discharge, patient will complete basic self care tasks with supervision.       Dates: Start: 02/01/23            Goal: LTG-By discharge, patient will perform bathroom transfers with supervision.       Dates: Start: 02/01/23            Goal: LTG-By discharge, patient will complete basic home management with supervision.       Dates: Start: 02/01/23

## 2023-02-03 ENCOUNTER — APPOINTMENT (OUTPATIENT)
Dept: RADIOLOGY | Facility: REHABILITATION | Age: 33
DRG: 560 | End: 2023-02-03
Attending: PHYSICAL MEDICINE & REHABILITATION
Payer: MEDICARE

## 2023-02-03 ENCOUNTER — APPOINTMENT (OUTPATIENT)
Dept: RADIOLOGY | Facility: REHABILITATION | Age: 33
DRG: 560 | End: 2023-02-03
Attending: HOSPITALIST
Payer: MEDICARE

## 2023-02-03 PROBLEM — D69.6 THROMBOCYTOPENIA (HCC): Status: ACTIVE | Noted: 2023-02-03

## 2023-02-03 PROBLEM — R07.9 CHEST PAIN: Status: ACTIVE | Noted: 2022-05-13

## 2023-02-03 LAB
ALBUMIN SERPL BCP-MCNC: 4.1 G/DL (ref 3.2–4.9)
ALBUMIN/GLOB SERPL: 2 G/DL
ALP SERPL-CCNC: 63 U/L (ref 30–99)
ALT SERPL-CCNC: 25 U/L (ref 2–50)
ANION GAP SERPL CALC-SCNC: 12 MMOL/L (ref 7–16)
AST SERPL-CCNC: 14 U/L (ref 12–45)
BASOPHILS # BLD AUTO: 1 % (ref 0–1.8)
BASOPHILS # BLD: 0.05 K/UL (ref 0–0.12)
BILIRUB SERPL-MCNC: 0.3 MG/DL (ref 0.1–1.5)
BUN SERPL-MCNC: 13 MG/DL (ref 8–22)
CALCIUM ALBUM COR SERPL-MCNC: 9.4 MG/DL (ref 8.5–10.5)
CALCIUM SERPL-MCNC: 9.5 MG/DL (ref 8.5–10.5)
CHLORIDE SERPL-SCNC: 102 MMOL/L (ref 96–112)
CO2 SERPL-SCNC: 22 MMOL/L (ref 20–33)
CREAT SERPL-MCNC: 0.76 MG/DL (ref 0.5–1.4)
EKG IMPRESSION: NORMAL
EOSINOPHIL # BLD AUTO: 0.27 K/UL (ref 0–0.51)
EOSINOPHIL NFR BLD: 5.5 % (ref 0–6.9)
ERYTHROCYTE [DISTWIDTH] IN BLOOD BY AUTOMATED COUNT: 40.3 FL (ref 35.9–50)
GFR SERPLBLD CREATININE-BSD FMLA CKD-EPI: 106 ML/MIN/1.73 M 2
GLOBULIN SER CALC-MCNC: 2.1 G/DL (ref 1.9–3.5)
GLUCOSE BLD STRIP.AUTO-MCNC: 113 MG/DL (ref 65–99)
GLUCOSE SERPL-MCNC: 107 MG/DL (ref 65–99)
HCT VFR BLD AUTO: 36.7 % (ref 37–47)
HGB BLD-MCNC: 12.5 G/DL (ref 12–16)
IMM GRANULOCYTES # BLD AUTO: 0.08 K/UL (ref 0–0.11)
IMM GRANULOCYTES NFR BLD AUTO: 1.6 % (ref 0–0.9)
LYMPHOCYTES # BLD AUTO: 1.63 K/UL (ref 1–4.8)
LYMPHOCYTES NFR BLD: 33.1 % (ref 22–41)
MCH RBC QN AUTO: 30.8 PG (ref 27–33)
MCHC RBC AUTO-ENTMCNC: 34.1 G/DL (ref 33.6–35)
MCV RBC AUTO: 90.4 FL (ref 81.4–97.8)
MONOCYTES # BLD AUTO: 0.4 K/UL (ref 0–0.85)
MONOCYTES NFR BLD AUTO: 8.1 % (ref 0–13.4)
NEUTROPHILS # BLD AUTO: 2.5 K/UL (ref 2–7.15)
NEUTROPHILS NFR BLD: 50.7 % (ref 44–72)
NRBC # BLD AUTO: 0 K/UL
NRBC BLD-RTO: 0 /100 WBC
PLATELET # BLD AUTO: 162 K/UL (ref 164–446)
PMV BLD AUTO: 11.6 FL (ref 9–12.9)
POTASSIUM SERPL-SCNC: 4.2 MMOL/L (ref 3.6–5.5)
PROT SERPL-MCNC: 6.2 G/DL (ref 6–8.2)
RBC # BLD AUTO: 4.06 M/UL (ref 4.2–5.4)
SODIUM SERPL-SCNC: 136 MMOL/L (ref 135–145)
TROPONIN T SERPL-MCNC: <6 NG/L (ref 6–19)
WBC # BLD AUTO: 4.9 K/UL (ref 4.8–10.8)

## 2023-02-03 PROCEDURE — 80053 COMPREHEN METABOLIC PANEL: CPT

## 2023-02-03 PROCEDURE — 74018 RADEX ABDOMEN 1 VIEW: CPT

## 2023-02-03 PROCEDURE — 93005 ELECTROCARDIOGRAM TRACING: CPT | Performed by: HOSPITALIST

## 2023-02-03 PROCEDURE — 71045 X-RAY EXAM CHEST 1 VIEW: CPT

## 2023-02-03 PROCEDURE — 85025 COMPLETE CBC W/AUTO DIFF WBC: CPT

## 2023-02-03 PROCEDURE — 97116 GAIT TRAINING THERAPY: CPT | Mod: CQ

## 2023-02-03 PROCEDURE — 97110 THERAPEUTIC EXERCISES: CPT

## 2023-02-03 PROCEDURE — 93010 ELECTROCARDIOGRAM REPORT: CPT | Performed by: INTERNAL MEDICINE

## 2023-02-03 PROCEDURE — 82962 GLUCOSE BLOOD TEST: CPT

## 2023-02-03 PROCEDURE — 84484 ASSAY OF TROPONIN QUANT: CPT

## 2023-02-03 PROCEDURE — 97530 THERAPEUTIC ACTIVITIES: CPT

## 2023-02-03 PROCEDURE — 700102 HCHG RX REV CODE 250 W/ 637 OVERRIDE(OP): Performed by: PHYSICAL MEDICINE & REHABILITATION

## 2023-02-03 PROCEDURE — 93005 ELECTROCARDIOGRAM TRACING: CPT | Performed by: PHYSICAL MEDICINE & REHABILITATION

## 2023-02-03 PROCEDURE — 770010 HCHG ROOM/CARE - REHAB SEMI PRIVAT*

## 2023-02-03 PROCEDURE — 700111 HCHG RX REV CODE 636 W/ 250 OVERRIDE (IP): Performed by: PHYSICAL MEDICINE & REHABILITATION

## 2023-02-03 PROCEDURE — A9270 NON-COVERED ITEM OR SERVICE: HCPCS | Performed by: PHYSICAL MEDICINE & REHABILITATION

## 2023-02-03 PROCEDURE — 99232 SBSQ HOSP IP/OBS MODERATE 35: CPT | Performed by: PHYSICAL MEDICINE & REHABILITATION

## 2023-02-03 PROCEDURE — 97535 SELF CARE MNGMENT TRAINING: CPT

## 2023-02-03 PROCEDURE — 94760 N-INVAS EAR/PLS OXIMETRY 1: CPT

## 2023-02-03 PROCEDURE — 99221 1ST HOSP IP/OBS SF/LOW 40: CPT | Performed by: HOSPITALIST

## 2023-02-03 PROCEDURE — 36415 COLL VENOUS BLD VENIPUNCTURE: CPT

## 2023-02-03 RX ADMIN — OXYCODONE HYDROCHLORIDE 10 MG: 10 TABLET ORAL at 15:27

## 2023-02-03 RX ADMIN — ENOXAPARIN SODIUM 40 MG: 40 INJECTION SUBCUTANEOUS at 17:10

## 2023-02-03 RX ADMIN — TRAZODONE HYDROCHLORIDE 100 MG: 100 TABLET ORAL at 19:46

## 2023-02-03 RX ADMIN — GABAPENTIN 300 MG: 300 CAPSULE ORAL at 14:02

## 2023-02-03 RX ADMIN — OMEPRAZOLE 20 MG: 20 CAPSULE, DELAYED RELEASE ORAL at 09:12

## 2023-02-03 RX ADMIN — HYDROMORPHONE HYDROCHLORIDE 2 MG: 2 TABLET ORAL at 09:16

## 2023-02-03 RX ADMIN — METHOCARBAMOL 500 MG: 500 TABLET ORAL at 14:02

## 2023-02-03 RX ADMIN — OXYCODONE HYDROCHLORIDE 10 MG: 10 TABLET ORAL at 12:34

## 2023-02-03 RX ADMIN — ONDANSETRON 4 MG: 4 TABLET, ORALLY DISINTEGRATING ORAL at 18:34

## 2023-02-03 RX ADMIN — Medication 10.5 MG: at 19:47

## 2023-02-03 RX ADMIN — METHOCARBAMOL 500 MG: 500 TABLET ORAL at 09:12

## 2023-02-03 RX ADMIN — METHOCARBAMOL 500 MG: 500 TABLET ORAL at 19:46

## 2023-02-03 RX ADMIN — Medication 2000 UNITS: at 09:11

## 2023-02-03 RX ADMIN — ZIPRASIDONE HYDROCHLORIDE 40 MG: 40 CAPSULE ORAL at 19:46

## 2023-02-03 RX ADMIN — OXYCODONE HYDROCHLORIDE 10 MG: 10 TABLET ORAL at 06:48

## 2023-02-03 RX ADMIN — OXYCODONE HYDROCHLORIDE 10 MG: 10 TABLET ORAL at 21:20

## 2023-02-03 RX ADMIN — GABAPENTIN 300 MG: 300 CAPSULE ORAL at 19:46

## 2023-02-03 RX ADMIN — SENNOSIDES AND DOCUSATE SODIUM 2 TABLET: 50; 8.6 TABLET ORAL at 19:46

## 2023-02-03 RX ADMIN — SENNOSIDES AND DOCUSATE SODIUM 2 TABLET: 50; 8.6 TABLET ORAL at 09:11

## 2023-02-03 RX ADMIN — GABAPENTIN 300 MG: 300 CAPSULE ORAL at 09:11

## 2023-02-03 RX ADMIN — DIPHENHYDRAMINE HCL 50 MG: 25 TABLET ORAL at 19:46

## 2023-02-03 RX ADMIN — HYDROMORPHONE HYDROCHLORIDE 2 MG: 2 TABLET ORAL at 17:14

## 2023-02-03 ASSESSMENT — ENCOUNTER SYMPTOMS
EYES NEGATIVE: 1
PALPITATIONS: 0
BRUISES/BLEEDS EASILY: 0
NAUSEA: 0
VOMITING: 0
FEVER: 0
SHORTNESS OF BREATH: 0
ABDOMINAL PAIN: 0
POLYDIPSIA: 0
CHILLS: 0
COUGH: 0

## 2023-02-03 ASSESSMENT — GAIT ASSESSMENTS
DEVIATION: ANTALGIC;DECREASED HEEL STRIKE;DECREASED TOE OFF
GAIT LEVEL OF ASSIST: STANDBY ASSIST
DISTANCE (FEET): 140
ASSISTIVE DEVICE: FRONT WHEEL WALKER

## 2023-02-03 ASSESSMENT — ACTIVITIES OF DAILY LIVING (ADL)
TOILET_TRANSFER_DESCRIPTION: GRAB BAR;SUPERVISION FOR SAFETY;SET-UP OF EQUIPMENT;VERBAL CUEING
TOILETING_LEVEL_OF_ASSIST_DESCRIPTION: GRAB BAR;SUPERVISION FOR SAFETY;SET-UP OF EQUIPMENT
BED_CHAIR_WHEELCHAIR_TRANSFER_DESCRIPTION: SET-UP OF EQUIPMENT;SUPERVISION FOR SAFETY
TUB_SHOWER_TRANSFER_DESCRIPTION: GRAB BAR;SUPERVISION FOR SAFETY
BED_CHAIR_WHEELCHAIR_TRANSFER_DESCRIPTION: ADAPTIVE EQUIPMENT;SUPERVISION FOR SAFETY
TOILET_TRANSFER_DESCRIPTION: GRAB BAR;SUPERVISION FOR SAFETY;SET-UP OF EQUIPMENT
TOILETING_LEVEL_OF_ASSIST_DESCRIPTION: GRAB BAR;SUPERVISION FOR SAFETY
TOILET_TRANSFER_DESCRIPTION: GRAB BAR;SUPERVISION FOR SAFETY;VERBAL CUEING
BED_CHAIR_WHEELCHAIR_TRANSFER_DESCRIPTION: SET-UP OF EQUIPMENT;SUPERVISION FOR SAFETY;VERBAL CUEING

## 2023-02-03 ASSESSMENT — PAIN DESCRIPTION - PAIN TYPE
TYPE: ACUTE PAIN
TYPE: ACUTE PAIN

## 2023-02-03 NOTE — CARE PLAN
Problem: Pain - Standard  Goal: Alleviation of pain or a reduction in pain to the patient’s comfort goal  Flowsheets  Taken 2/3/2023 1234  Pain Rating Scale (NPRS): 6  Taken 2/3/2023 0916  Non Verbal Scale:   Calm   Unlabored Breathing     Problem: Hemodynamics  Goal: Patient's hemodynamics, fluid balance and neurologic status will be stable or improve  Note: Rapid response called on patient for chest pain radiating to the left jaw and arm. STAT labs drawn per order and vitals are in chart. EKG performed by RT.

## 2023-02-03 NOTE — THERAPY
Occupational Therapy  Daily Treatment     Patient Name: Kristin Balderrama  Age:  32 y.o., Sex:  female  Medical Record #: [id number]  Today's Date: 2/3/2023     Precautions  Precautions: Fall Risk, Weight Bearing As Tolerated Left Lower Extremity  Comments: POTS, Annetta-Danlos syndrome, schizophrenia         Subjective    Patient was seated EOB upon arrival. She stated she needed to use the bathroom and agreed to a shower. Patient stated that the warm water felt nice on her hip and leg. At end of session, patient stated she was experiencing jaw pain, L arm pain which wrapped around her back.  She states it happens frequently and usually goes away within two hours.       Objective       02/03/23 1231   OT Charge Group   OT Self Care / ADL (Units) 2   OT Therapy Activity (Units) 1   OT Therapeutic Exercise (Units) 1   OT Total Time Spent   OT Individual Total Time Spent (Mins) 60   Precautions   Precautions Fall Risk;Weight Bearing As Tolerated Left Lower Extremity   Comments POTS, Annetta-Danlos syndrome, schizophrenia   Functional Level of Assist   Bathing Standby Assist   Bathing Description Grab bar;Hand held shower;Long handled bath tool;Tub bench;Supervision for safety   Upper Body Dressing Stand by Assist   Upper Body Dressing Description Supervision for safety  (SBA to retrieve clothing from closet with FWW, supervised to don/doff while seated)   Lower Body Dressing Contact Guard Assist  (completed 18/18 dressing activities with CGA for standing balance)   Lower Body Dressing Description Dressing stick;Reacher;Sock aid;Supervision for safety;Verbal cueing  (SBA to retrieve clothing from closet with FWW, CGA for dressing)   Toileting Supervision   Toileting Description Grab bar;Supervision for safety   Bed, Chair, Wheelchair Transfer Standby Assist   Bed Chair Wheelchair Transfer Description Set-up of equipment;Supervision for safety   Toilet Transfers Contact Guard Assist  (CGA for onto toilet transfer due to  urgency, SBA for getting up from toilet)   Toilet Transfer Description Grab bar;Supervision for safety;Set-up of equipment   Tub / Shower Transfers Standby Assist   Tub Shower Transfer Description Grab bar;Supervision for safety   Sitting Upper Body Exercises   Upper Extremity Bike Level 2 Resistance  (motomed level 2 for 7 minutes)   Bed Mobility    Sit to Supine Supervised   Interdisciplinary Plan of Care Collaboration   IDT Collaboration with  Nursing   Patient Position at End of Therapy In Bed;Call Light within Reach;Tray Table within Reach;Phone within Reach   Collaboration Comments notified nursing that patient complained for L jaw/arm/back pain     Vitals taken in supine upon return to bed at end of session with /52, HR 72, O2 92%.    Assessment    Patient had no LOB during session. She did well with the shower and doff/donning clothes. Patient was flush after shower. Patient used w/c to get to gym to participate in UE exercise with motomed.  Patient was not acting like herself towards end of session, closing eyes frequently and stated she had jaw pain and L arm pain.  Strengths: Able to follow instructions, Adequate strength, Alert and oriented, Effective communication skills, Good insight into deficits/needs, Independent prior level of function, Motivated for self care and independence, Pleasant and cooperative, Willingly participates in therapeutic activities  Barriers: Decreased endurance, Fatigue, Impaired activity tolerance, Impaired balance, Pain, Orthostatic hypotension    Plan    ADLs, IADLs, core strength, UE strength, standing balance/tolerance.     Occupational Therapy Goals (Active)       Problem: Bathing       Dates: Start: 02/01/23         Goal: STG-Within one week, patient will bathe with supervision and AE as needed.       Dates: Start: 02/01/23         Goal Note filed on 02/02/23 1137 by Sesar Leach OT/GIULIANO LIRIANO                 Problem: Dressing       Dates: Start: 02/01/23          Goal: STG-Within one week, patient will retrieve clothing with supervision and AE as needed.       Dates: Start: 02/01/23         Goal Note filed on 02/02/23 1137 by Sesar Leach OT/L       SBA/CGA with FWW                 Problem: Functional Transfers       Dates: Start: 02/01/23         Goal: STG-Within one week, patient will transfer to toilet with SBA.       Dates: Start: 02/01/23         Goal Note filed on 02/02/23 1137 by Sesar Leach, OT/L       CGA                  Problem: OT Long Term Goals       Dates: Start: 02/01/23         Goal: LTG-By discharge, patient will complete basic self care tasks with supervision.       Dates: Start: 02/01/23            Goal: LTG-By discharge, patient will perform bathroom transfers with supervision.       Dates: Start: 02/01/23            Goal: LTG-By discharge, patient will complete basic home management with supervision.       Dates: Start: 02/01/23

## 2023-02-03 NOTE — ASSESSMENT & PLAN NOTE
Had GLF 2/2 POTS w/ left hip fx  S/P percutaneous fixation on 1/28/23 by Dr. Abdul  Wound care and pain control per Physiatry

## 2023-02-03 NOTE — THERAPY
Physical Therapy   Daily Treatment     Patient Name: Kristin Balderrama  Age:  32 y.o., Sex:  female  Medical Record #: [id number]  Today's Date: 2/3/2023     Precautions  Precautions: Fall Risk, Weight Bearing As Tolerated Left Lower Extremity  Comments: POTS, Annetta-Danlos syndrome, schizophrenia    Subjective    Pt was supine in bed upon arrival.  Pt with recent rapid response called.  Pt reported continued lightheadedness, jaw and arm numbness.     Objective       02/03/23 1431   PT Charge Group   PT Therapeutic Exercise (Units) 2   PT Therapeutic Activities (Units) 2   PT Total Time Spent   PT Individual Total Time Spent (Mins) 60   Precautions   Precautions Fall Risk;Weight Bearing As Tolerated Left Lower Extremity   Pain 0 - 10 Group   Location Hip   Therapist Pain Assessment Post Activity Pain Same as Prior to Activity;Nurse Notified;7   Transfer Functional Level of Assist   Bed, Chair, Wheelchair Transfer Standby Assist   Bed Chair Wheelchair Transfer Description Set-up of equipment;Supervision for safety;Verbal cueing   Toilet Transfers Standby Assist   Toilet Transfer Description Grab bar;Supervision for safety;Verbal cueing   Supine Lower Body Exercise   Hip Abduction 3 sets of 10;Bilateral   Hip Adduction  3 sets of 10;Bilateral   Heel Slide 3 sets of 10;Bilateral   Ankle Pumps 3 sets of 10;Bilateral   Gluteal Isometrics 3 sets of 10;Bilateral   Quadriceps Isometrics 3 sets of 10;Bilateral   Bed Mobility    Supine to Sit Supervised   Sit to Supine Supervised   Sit to Stand Standby Assist   Scooting Supervised   Interdisciplinary Plan of Care Collaboration   Patient Position at End of Therapy In Bed;Call Light within Reach;Tray Table within Reach;Phone within Reach     Completed collaboration with RN and MD on pt's medical status; MD stated clearance for therapy with no precautions.  Attempted to find leg  for pain management; however, none available.  Pt instructed on use with gait belt.  Pt given  supine HEP.      Assessment    Pt limited this session d/t lightheadedness and pain, but completed activities to the best of her abilities.  Pt verbalized all education.   Strengths: Able to follow instructions, Adequate strength, Alert and oriented, Effective communication skills, Good carryover of learning, Good insight into deficits/needs, Independent prior level of function, Manages pain appropriately, Motivated for self care and independence, Pleasant and cooperative, Supportive family, Willingly participates in therapeutic activities  Barriers: Decreased endurance, Fatigue, Impaired activity tolerance, Impaired balance, Limited mobility, Pain (hx of POTS with frequent falls at home)    Plan    Gait with FWW, standing balance, stairs/curb training, energy conservation, LE strengthening, monitor dizziness with position changes d/t hx of POTS     Passport items to be completed:  Get in/out of bed safely, in/out of a vehicle, safely use mobility device, walk or wheel around home/community, navigate up and down stairs, show how to get up/down from the ground, ensure home is accessible, demonstrate HEP, complete caregiver training    Physical Therapy Problems (Active)       Problem: Mobility       Dates: Start: 02/01/23         Goal: STG-Within one week, patient will ambulate household distance 50 ft x2 with FWW and SBA.       Dates: Start: 02/01/23         Goal Note filed on 02/02/23 1349 by Luz Maria Acuña, PT       Up to 85 ft x3 with CGA              Goal: STG-Within one week, patient will ambulate up/down a curb with FWW and CGA.       Dates: Start: 02/01/23         Goal Note filed on 02/02/23 1349 by Luz Maria Acuña, PT       Initiated curb step training today              Goal: STG-Within one week, patient will ascend and descend four to six stairs with min A and BHR as needed.       Dates: Start: 02/01/23         Goal Note filed on 02/02/23 1349 by Luz Maria Acuña, PT       Not yet initiated                 Problem:  Mobility Transfers       Dates: Start: 02/01/23         Goal: STG-Within one week, patient will transfer bed to chair with FWW and SBA.       Dates: Start: 02/01/23         Goal Note filed on 02/02/23 1349 by Luz Maria Acuña PT       CGA                 Problem: PT-Long Term Goals       Dates: Start: 02/01/23         Goal: LTG-By discharge, patient will ambulate 150 ft x2 with FWW vs LRAD and SBA.       Dates: Start: 02/01/23            Goal: LTG-By discharge, patient will transfer one surface to another with FWW vs LRAD and SPV.       Dates: Start: 02/01/23            Goal: LTG-By discharge, patient will ambulate up/down flight of stairs with SBA and RHR.       Dates: Start: 02/01/23            Goal: LTG-By discharge, patient will transfer in/out of a car with FWW vs LRAD and set up assist.       Dates: Start: 02/01/23

## 2023-02-03 NOTE — CONSULTS
"  HOSPITAL MEDICINE CONSULTATION    Requesting Physician:  Dr. Simmons    Reason for Consult:  Chest Pain    History of Present Illness:  The patient is a 32-year-old  female with past medical history significant for postural orthostatic tachycardia syndrome, asthma, and schizophrenia.  She was admitted to [facility] on 1/27/23 after a ground level fall secondary to POTS.  The patient sustained a left hip fracture and underwent percutaneous fixation on 1/28/23 by Dr. Abdul.  Due to her ongoing functional debility, the patient was transferred to [facility] on 1/31/23.  Hospital Medicine consultation is requested on 2/3/23 to assist in the management of this patient's chest pain.  She is also noted to have thrombocytopenia.    Review of Systems:  Review of Systems   Constitutional:  Negative for chills and fever.   HENT: Negative.     Eyes: Negative.    Respiratory:  Negative for cough and shortness of breath.    Cardiovascular:  Positive for chest pain. Negative for palpitations.   Gastrointestinal:  Negative for abdominal pain, nausea and vomiting.   Musculoskeletal:  Positive for joint pain.        Wound pain   Skin:  Negative for itching and rash.   Endo/Heme/Allergies:  Negative for polydipsia. Does not bruise/bleed easily.   All other systems reviewed and are negative.    Allergies:  Allergies   Allergen Reactions    Cefdinir Shortness of Breath and Itching     Tolerated 1/18/17  Tolerates ceftriaxone  Tolerated augmentin 8/2019     Depakote [Divalproex Sodium] Unspecified     Muscle spasms/muscle pain and weakness      Doxycycline Anaphylaxis and Vomiting     Other reaction(s): pustules/blisters  Other reaction(s): stomach pain    Montelukast [Singulair] Unspecified     Cardiac effusion    Vancomycin Itching     Pt becomes flushed in face and chest.   RXN=7/10/16    Wound Dressing Adhesive Rash     By pt report-\"removes skin totally off\"    Amitriptyline " "Unspecified     Headaches      Aripiprazole [Abilify] Unspecified     Headaches/muscle twitching      Clindamycin Nausea         Other reaction(s): nausea stomach pain    Flomax [Tamsulosin Hydrochloride] Swelling    Levaquin Unspecified     Severe muscle cramps in legs  RXN=unknown  Other reaction(s): leg muscle cramps    Metformin Unspecified     Increased lactic acid     Other reaction(s): itching and rash/nausea vomiting    Tamsulosin Swelling     Swelling of legs    Tape Rash     Tears skin off  coban with Tegaderm tape ok intermittently  RXN=ongoing    Amoxicillin Rash    Depakote [Divalproex Sodium]     Erythromycin Rash     .  Other reaction(s): nausea stomach pain    Hydromorphone      Other reaction(s): vomiting    Ampicillin Rash     Pt reports that she received a rash     Ciprofloxacin Rash          Keflex Rash     Pt states she gets a rash with this medication  Tolerates ceftriaxone  Can take with Benadryl    Levofloxacin Unspecified     Leg muscle cramps    Metronidazole Rash     \"Vision problems\"  Other reaction(s): vision problems    Penicillins Hives     Can take with Benadryl    Sulfa Drugs Itching and Myalgia     Muscle pain and weakness  Other reaction(s): unknown    Valproic Acid Rash     .       Medications:    Current Facility-Administered Medications:     diphenhydrAMINE (BENADRYL) tablet/capsule 50 mg, 50 mg, Oral, HS PRN - MR X 1, Craig Simmons M.D., 50 mg at 02/02/23 1943    gabapentin (NEURONTIN) capsule 300 mg, 300 mg, Oral, TID, Craig Simmons M.D., 300 mg at 02/03/23 1402    vitamin D3 (cholecalciferol) tablet 2,000 Units, 2,000 Units, Oral, DAILY, Craig Simmons M.D., 2,000 Units at 02/03/23 0911    Respiratory Therapy Consult, , Nebulization, Continuous RT, Craig Simmons M.D.    hydrALAZINE (APRESOLINE) tablet 25 mg, 25 mg, Oral, Q8HRS PRN, Craig Simmons M.D.    acetaminophen (Tylenol) tablet 650 mg, 650 mg, Oral, Q4HRS PRN, " Craig Simmons M.D.    senna-docusate (PERICOLACE or SENOKOT S) 8.6-50 MG per tablet 2 Tablet, 2 Tablet, Oral, BID, 2 Tablet at 02/03/23 0911 **AND** polyethylene glycol/lytes (MIRALAX) PACKET 1 Packet, 1 Packet, Oral, QDAY PRN **AND** magnesium hydroxide (MILK OF MAGNESIA) suspension 30 mL, 30 mL, Oral, QDAY PRN, 30 mL at 02/02/23 0612 **AND** bisacodyl (DULCOLAX) suppository 10 mg, 10 mg, Rectal, QDAY PRN, Craig Simmons M.D.    omeprazole (PRILOSEC) capsule 20 mg, 20 mg, Oral, DAILY, Craig Simmons M.D., 20 mg at 02/03/23 0912    mag hydrox-al hydrox-simeth (MAALOX PLUS ES or MYLANTA DS) suspension 20 mL, 20 mL, Oral, Q2HRS PRN, Craig Simmons M.D.    ondansetron (ZOFRAN ODT) dispertab 4 mg, 4 mg, Oral, 4X/DAY PRN **OR** ondansetron (ZOFRAN) syringe/vial injection 4 mg, 4 mg, Intramuscular, 4X/DAY PRN, Craig Simmons M.D.    traZODone (DESYREL) tablet 50 mg, 50 mg, Oral, QHS PRN, Craig Simmons M.D., 50 mg at 02/01/23 2201    sodium chloride (OCEAN) 0.65 % nasal spray 2 Spray, 2 Spray, Nasal, PRN, Craig Simmons M.D.    traMADol (Ultram) 50 MG tablet 50 mg, 50 mg, Oral, Q4HRS PRN, Craig Simmons M.D.    acetaminophen (Tylenol) tablet 650 mg, 650 mg, Oral, Q6HRS PRN, Craig Simmons M.D.    albuterol inhaler 2 Puff, 2 Puff, Inhalation, Q6HRS PRN, Craig Simmons M.D., 2 Puff at 02/02/23 1404    enoxaparin (Lovenox) inj 40 mg, 40 mg, Subcutaneous, DAILY AT 1800, Craig Simmons M.D., 40 mg at 02/02/23 1711    ipratropium-albuterol (DUONEB) nebulizer solution, 3 mL, Nebulization, Q6HRS PRN, Craig Simmons M.D.    ivabradine (Corlanor) tablet 7.5 mg, 7.5 mg, Oral, BID WITH MEALS, Craig Simmons M.D., 7.5 mg at 02/03/23 0800    melatonin tablet 10.5 mg, 10.5 mg, Oral, QHS, Craig Simmons M.D., 10.5 mg at 02/02/23 1943    methocarbamol (ROBAXIN) tablet 500 mg, 500 mg, Oral, TID,  "Craig Simmons M.D., 500 mg at 02/03/23 1402    metoprolol SR (TOPROL XL) tablet 25 mg, 25 mg, Oral, QAM, Craig Simmons M.D., 25 mg at 02/02/23 0827    traZODone (DESYREL) tablet 100 mg, 100 mg, Oral, QHS, Craig Simmons M.D., 100 mg at 02/02/23 1944    ziprasidone (GEODON) capsule 40 mg, 40 mg, Oral, QHS, Craig Simmons M.D., 40 mg at 02/02/23 1944    [DISCONTINUED] oxyCODONE immediate-release (ROXICODONE) tablet 5 mg, 5 mg, Oral, Q3HRS PRN **OR** oxyCODONE immediate release (ROXICODONE) tablet 10 mg, 10 mg, Oral, Q3HRS PRN, Craig Simmons M.D., 10 mg at 02/03/23 1234    HYDROmorphone (DILAUDID) tablet 2 mg, 2 mg, Oral, Q3HRS PRN, Craig Simmons M.D., 2 mg at 02/03/23 0916    Past Medical/Surgical History:  Past Medical History:   Diagnosis Date    Abdominal pain     Anginal syndrome     random chest pain especially with tachycardia    Apnea, sleep     Arrhythmia     \"sinus tachycardia\", cariologist, Dr. Kumar; ablation 2/2016    Arthritis     osteo    ASTHMA     when around smoke    Back pain     Borderline personality disorder (HCC)     Breath shortness     with tachycardia    Bronchitis 02/08/2022    Last time was 12/21    Cardiac arrhythmia     Chickenpox     Chronic UTI 09/18/2010    Cough     Daytime sleepiness     Dental disorder 03/08/2021    infected tooth    Depression     Diabetes (HCC)     Diarrhea     incontinece    Disorder of thyroid     Hashimoto's    Fall     Fatigue     Frequent headaches     Gasping for breath     Gynecological disorder     PCOS    Headache(784.0)     Heart burn     Heart murmur     History of falling     Indigestion     Migraine     Mitochondrial disease (HCC)     Multiple personality disorder (HCC)     Nausea     Obesity     Other fatigue 06/29/2020    Pain 08/15/2012    back, 7/10    Painful joint     PCOS (polycystic ovarian syndrome)     Pneumonia 2012 12/2020    Psychosis (HCC)     Ringing in ears     Scoliosis  " "   Shortness of breath     O2 as needed    Sinus tachycardia 10/31/2013    Sleep apnea     CPAP \"pulmonary doctor took me off mid year 2016\"    Snoring     Tonsillitis     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HCC)     2/8/22: Per pt: not anymore    Tuberculosis     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    Urinary bladder disorder     Suprapubic cath. 2/8/22: Not anymore.     Urinary incontinence     Weakness     Wears glasses      Past Surgical History:   Procedure Laterality Date    PB PERCUT FIX PBOX/NECK FEMUR FX Left 1/28/2023    Procedure: FIXATION, HIP, USING CANNULATED SCREW;  Surgeon: Noman Abdul M.D.;  Location: SURGERY [facility];  Service: Orthopedics    CT LAP,DIAGNOSTIC ABDOMEN  2/14/2022    Procedure: LAPAROSCOPY;  Surgeon: Seamus Pisano M.D.;  Location: SURGERY SAME DAY [facility];  Service: Gynecology    OVARIAN CYSTECTOMY Right 2/14/2022    Procedure: EXCISION, CYST, OVARY;  Surgeon: Seamus Pisano M.D.;  Location: SURGERY SAME DAY [facility];  Service: Gynecology    BOWEL STIMULATOR INSERTION  3/10/2021    Procedure: INSERTION, ELECTRODE LEADS AND PULSE GENERATOR, NEUROSTIMULATOR, SACRAL - REMOVAL OF INTERSTIM WITH REPLACEMENT OF SACRAL NEUROMODULATION DEVICE;  Surgeon: Joe Noyola M.D.;  Location: [facility];  Service: General    MUSCLE BIOPSY Right 1/26/2017    Procedure: MUSCLE BIOPSY - THIGH;  Surgeon: Isidro Vigil M.D.;  Location: [facility];  Service:     GASTROSCOPY WITH BALLOON DILATATION N/A 1/4/2017    Procedure: GASTROSCOPY WITH DILATATION;  Surgeon: Torres Vargas M.D.;  Location: [facility];  Service:     BOWEL STIMULATOR INSERTION  7/13/2016    Procedure: BOWEL STIMULATOR INSERTION FOR PERMANENT INTERSTIM SACRAL IMPLANT;  Surgeon: Joe Noyola M.D.;  Location: [facility];  Service:     RECOVERY  1/27/2016    Procedure: CATH LAB EP STUDY WITH SINUS NODE MODIFICATION ABHINAV;  Surgeon: [facility];  Location: SURGERY PRE-POST " PROC UNIT [facility];  Service:     OTHER CARDIAC SURGERY  1/2016    cardiac ablation    NEURO DEST FACET L/S W/IG SNGL  4/21/2015    Performed by Reza Tabor at SURGERY [facility]    LUMBAR FUSION ANTERIOR  8/21/2012    Performed by SUSIE SAWANT at SURGERY [facility]S    ALYSSA BY LAPAROSCOPY  8/29/2010    Performed by SHAYY JOHNS at SURGERY [facility]    LAMINOTOMY      OTHER ABDOMINAL SURGERY      TONSILLECTOMY      tonsillectomy       Social History:  Social History     Socioeconomic History    Marital status: Single     Spouse name: Not on file    Number of children: Not on file    Years of education: Not on file    Highest education level: Not on file   Occupational History    Not on file   Tobacco Use    Smoking status: Never    Smokeless tobacco: Never   Vaping Use    Vaping Use: Never used   Substance and Sexual Activity    Alcohol use: No     Alcohol/week: 0.0 oz    Drug use: Not Currently     Frequency: 7.0 times per week     Types: Marijuana    Sexual activity: Not Currently     Birth control/protection: Implant   Other Topics Concern    Not on file   Social History Narrative    ** Merged History Encounter **          Social Determinants of Health     Financial Resource Strain: Not on file   Food Insecurity: Not on file   Transportation Needs: No Transportation Needs    Lack of Transportation (Medical): No    Lack of Transportation (Non-Medical): No   Physical Activity: Not on file   Stress: Not on file   Social Connections: Not on file   Intimate Partner Violence: Not on file   Housing Stability: Not on file       Family History:  Family History   Problem Relation Age of Onset    Hypertension Mother     Sleep Apnea Mother     Heart Disease Mother     Other Mother         hypothryod    Hypertension Maternal Uncle     Heart Disease Maternal Grandmother     Hypertension Maternal Grandmother     No Known Problems Sister     Other Sister         Narcolepsy;fibromyalsia;bone;nerve     Genitourinary () Problems Sister         endometriosis       Physical Examination:   Vitals:    02/02/23 1850 02/03/23 0710 02/03/23 1326 02/03/23 1334   BP: 110/73 96/62 101/52 119/63   Pulse: 80 68 72 75   Resp: 18 18  12   Temp: 36.7 °C (98 °F) 36.7 °C (98 °F)  36.7 °C (98.1 °F)   TempSrc: Oral Oral     SpO2: 94% 94% 92% 97%   Weight:       Height:           Physical Exam  Vitals reviewed.   Constitutional:       General: She is not in acute distress.     Appearance: Normal appearance. She is not ill-appearing.   HENT:      Head: Normocephalic and atraumatic.      Right Ear: External ear normal.      Left Ear: External ear normal.      Nose: Nose normal.      Mouth/Throat:      Pharynx: Oropharynx is clear.   Eyes:      General:         Right eye: No discharge.         Left eye: No discharge.      Extraocular Movements: Extraocular movements intact.      Conjunctiva/sclera: Conjunctivae normal.   Cardiovascular:      Rate and Rhythm: Normal rate and regular rhythm.   Pulmonary:      Effort: Pulmonary effort is normal. No respiratory distress.      Breath sounds: Normal breath sounds. No wheezing.   Abdominal:      General: Bowel sounds are normal. There is no distension.      Palpations: Abdomen is soft.      Tenderness: There is no abdominal tenderness.   Musculoskeletal:      Cervical back: Normal range of motion and neck supple.      Right lower leg: No edema.      Left lower leg: No edema.   Skin:     General: Skin is warm and dry.   Neurological:      Mental Status: She is alert and oriented to person, place, and time.       Laboratory Data:  Recent Labs     02/01/23  0615   WBC 5.0   RBC 4.36   HEMOGLOBIN 13.4   HEMATOCRIT 38.8   MCV 89.0   MCH 30.7   MCHC 34.5   RDW 39.6   PLATELETCT 154*   MPV 11.6     Recent Labs     02/01/23  0615   SODIUM 136   POTASSIUM 3.9   CHLORIDE 103   CO2 21   GLUCOSE 104*   BUN 12   CREATININE 0.68   CALCIUM 9.5             Impressions/Recommendations:  Moderate asthma  RT  protocol    S/p left hip fracture  Had GLF 2/2 POTS w/ left hip fx  S/P percutaneous fixation on 1/28/23 by Dr. Abdul  Wound care and pain control per Physiatry    Schizoaffective disorder, depressive type (HCC)  On Geodon    Chest pain  Pt reports h/o CP  Agree w/ R/O ACS    Thrombocytopenia (HCC)  Follow PLT    Vitamin D deficiency  Vit D level 15  On supplementation    Postural orthostatic tachycardia syndrome  Echo EF 55%  On Toprol and Corlanor  Cardiology F/U    Full Code    Discussed with Dr. Simmons.  Thank you for the opportunity to assist in this patient's care.  We will continue to follow along with you.

## 2023-02-03 NOTE — PROGRESS NOTES
"  Physical Medicine & Rehabilitation Progress Note    Encounter Date: 2/3/2023    Chief Complaint: Decreased mobility    Interval Events (Subjective):  Patient sitting up in therapy gym during morning rounds. She reports she is doing well. She reports concerns about weaning off of pain medications. Discussed we are not removing the dilaudid but want her to be down to Oxycodone for discharge. Discussed CM is looking for pain specialist that takes her insurance and can get her in quickly. Discussed about constipation.    Rapid Response: Call at 1300, patient with severe chest pain from chest to jaw. She reports she gets this due to POTS and has had to have nitro paste in the past. Stat EKG at bedside with normal sinus rhythm. SBP and blood sugars wnl. Hospitalist to bedside to consult. Stat CBC/CMP and Troponin and CXR.     _____________________________________  Interdisciplinary Team Conference   Most recent IDT on 2/2/2023     Discharge Date/Disposition:  2/10/23  _____________________________________    Objective:  VITAL SIGNS: /63   Pulse 75   Temp 36.7 °C (98.1 °F)   Resp 12   Ht 1.626 m (5' 4\")   Wt 107 kg (235 lb 14.3 oz)   SpO2 97%   BMI 40.49 kg/m²   Gen: NAD  Psych: Mood and affect appropriate  CV: RRR, 1+ edema  Resp: CTAB, no upper airway sounds  Abd: NTND  Neuro: AOx4, following commands.     Laboratory Values:  Recent Results (from the past 72 hour(s))   CBC with Differential    Collection Time: 02/01/23  6:15 AM   Result Value Ref Range    WBC 5.0 4.8 - 10.8 K/uL    RBC 4.36 4.20 - 5.40 M/uL    Hemoglobin 13.4 12.0 - 16.0 g/dL    Hematocrit 38.8 37.0 - 47.0 %    MCV 89.0 81.4 - 97.8 fL    MCH 30.7 27.0 - 33.0 pg    MCHC 34.5 33.6 - 35.0 g/dL    RDW 39.6 35.9 - 50.0 fL    Platelet Count 154 (L) 164 - 446 K/uL    MPV 11.6 9.0 - 12.9 fL    Neutrophils-Polys 54.90 44.00 - 72.00 %    Lymphocytes 32.90 22.00 - 41.00 %    Monocytes 6.80 0.00 - 13.40 %    Eosinophils 4.60 0.00 - 6.90 %    Basophils " 0.40 0.00 - 1.80 %    Immature Granulocytes 0.40 0.00 - 0.90 %    Nucleated RBC 0.00 /100 WBC    Neutrophils (Absolute) 2.76 2.00 - 7.15 K/uL    Lymphs (Absolute) 1.65 1.00 - 4.80 K/uL    Monos (Absolute) 0.34 0.00 - 0.85 K/uL    Eos (Absolute) 0.23 0.00 - 0.51 K/uL    Baso (Absolute) 0.02 0.00 - 0.12 K/uL    Immature Granulocytes (abs) 0.02 0.00 - 0.11 K/uL    NRBC (Absolute) 0.00 K/uL   Comp Metabolic Panel (CMP)    Collection Time: 02/01/23  6:15 AM   Result Value Ref Range    Sodium 136 135 - 145 mmol/L    Potassium 3.9 3.6 - 5.5 mmol/L    Chloride 103 96 - 112 mmol/L    Co2 21 20 - 33 mmol/L    Anion Gap 12.0 7.0 - 16.0    Glucose 104 (H) 65 - 99 mg/dL    Bun 12 8 - 22 mg/dL    Creatinine 0.68 0.50 - 1.40 mg/dL    Calcium 9.5 8.5 - 10.5 mg/dL    AST(SGOT) 15 12 - 45 U/L    ALT(SGPT) 31 2 - 50 U/L    Alkaline Phosphatase 71 30 - 99 U/L    Total Bilirubin 0.4 0.1 - 1.5 mg/dL    Albumin 4.1 3.2 - 4.9 g/dL    Total Protein 6.3 6.0 - 8.2 g/dL    Globulin 2.2 1.9 - 3.5 g/dL    A-G Ratio 1.9 g/dL   HEMOGLOBIN A1C    Collection Time: 02/01/23  6:15 AM   Result Value Ref Range    Glycohemoglobin 5.1 4.0 - 5.6 %    Est Avg Glucose 100 mg/dL   TSH with Reflex to FT4    Collection Time: 02/01/23  6:15 AM   Result Value Ref Range    TSH 6.730 (H) 0.380 - 5.330 uIU/mL   Vitamin D, 25-hydroxy (blood)    Collection Time: 02/01/23  6:15 AM   Result Value Ref Range    25-Hydroxy   Vitamin D 25 15 (L) 30 - 100 ng/mL   CORRECTED CALCIUM    Collection Time: 02/01/23  6:15 AM   Result Value Ref Range    Correct Calcium 9.4 8.5 - 10.5 mg/dL   ESTIMATED GFR    Collection Time: 02/01/23  6:15 AM   Result Value Ref Range    GFR (CKD-EPI) 118 >60 mL/min/1.73 m 2   FREE THYROXINE    Collection Time: 02/01/23  6:15 AM   Result Value Ref Range    Free T-4 1.41 0.93 - 1.70 ng/dL   POCT glucose device results    Collection Time: 02/03/23  1:33 PM   Result Value Ref Range    POC Glucose, Blood 113 (H) 65 - 99 mg/dL   EKG    Collection Time:  23  1:43 PM   Result Value Ref Range    Report       [facility]    Test Date:  2023  Pt Name:    HARLEY DE LA CRUZ              Department: [facility]  MRN:        [id number]                      Room:       [facility]  Gender:     Female                       Technician: [id number] WT  :        1990                   Requested By:SOREN OKEEFE  Order #:    [id number]                    Reading MD:    Measurements  Intervals                                Axis  Rate:       69                           P:          25  IA:         140                          QRS:        31  QRSD:       102                          T:          26  QT:         432  QTc:        463    Interpretive Statements  Sinus rhythm  Compared to ECG 2023 18:07:49  T-wave abnormality no longer present         Medications:  Scheduled Medications   Medication Dose Frequency    gabapentin  300 mg TID    vitamin D3  2,000 Units DAILY    senna-docusate  2 Tablet BID    omeprazole  20 mg DAILY    enoxaparin (LOVENOX) injection  40 mg DAILY AT 1800    ivabradine  7.5 mg BID WITH MEALS    melatonin  10.5 mg QHS    methocarbamol  500 mg TID    metoprolol SR  25 mg QAM    traZODone  100 mg QHS    ziprasidone  40 mg QHS     PRN medications: diphenhydrAMINE, Respiratory Therapy Consult, hydrALAZINE, acetaminophen, senna-docusate **AND** polyethylene glycol/lytes **AND** magnesium hydroxide **AND** bisacodyl, mag hydrox-al hydrox-simeth, ondansetron **OR** ondansetron, traZODone, sodium chloride, traMADol, acetaminophen, albuterol, ipratropium-albuterol, [DISCONTINUED] oxyCODONE immediate-release **OR** oxyCODONE immediate-release, HYDROmorphone    Diet:  Current Diet Order   Procedures    Diet Order Diet: Regular       Assessment:  Active Hospital Problems    Diagnosis     *Hip fracture, left, closed, initial encounter ([facility])     S/p left hip fracture     Moderate asthma     IBS (irritable bowel syndrome)     Schizoaffective disorder,  depressive type (HCC)     TONYA (obstructive sleep apnea)     Morbidly obese (HCC)        Medical Decision Making and Plan:  Fall with left hip fracture - Patient s/p fall with hip fracture s/p IMN with Dr. Abdul on 1/28/23. Patient WBAT.  -PT and OT for mobility and ADLs. Per guidelines, 15 hours per week between PT, OT and SLP.  -Follow-up Dr. Abdul     Hyperglycemia - Not on any medication on transfer. Will check A1c - 5.1. No longer diabetic     Asthma - Patient on PRN Duoneb and Albuterol inhaler      POTS - Patient on Metoprolol 25 mg XL and Corlanor 7.5 mg BID     New chest pain - Rapid response on 2/3/23 for chest pain. EKG to bedside with sinus rhythm. Stat Troponin, CXR, CBC, CMP. Patient's case was discussed face to face with Hospitalist on [facility] floor. Will repeat Troponin in 6 hours. Patient at high risk for further injury due to history of POTS, obesity and antipsychotic usage which can affected QTc.     Neuropathy - Patient on Gabapentin 100 mg TID -> 300 mg TID. High risk medication will need to monitor Cr. Will recheck      Thrombocytopenia - Check AM CBC - 154, stable. Continue to monitor     Morbid obesity due to excess calories - BMI of 39.7 on admission, meets medical criteria. Dietitian to consult     Schizophrenia - Patient on Geodon 40 mg QHS and Trazodone      Pain - APAP/Gabapentin/Oxycodone and Robaxin TID  -Change PRN to Oxycodone 10 mg for moderate and Dilaudid PO 2 mg q3h. As having severe pain and has constipation with oxycodone. Dilaudid is high risk medication discussed about previous confusion/agitation and will need to monitor in setting of psych history.      Skin - Patient at risk for skin breakdown due to debility in areas including sacrum, achilles, elbows and head in addition to other sites. Nursing to assess skin daily.      Vitamin D deficiency - acute on chronic. Start 2000 U     GI Ppx - Patient on Prilosec for GERD prophylaxis. Patient on Senna-docusate for constipation  prophylaxis.      DVT Ppx - Patient Lovenox on transfer.  -Worsening left leg pain. Patient with decreased sensation and swelling to lower LE. Discussed stat DVT doppler. Did have SOB. Will check CXR. RT to give albuterol, if ongoing will get CTA PE. Patient at very high risk for DVT/PE due to fracture and obesity.   ____________________________________    T. Dhruv Simmons MD./PhD.  [facility] - Physical Medicine & Rehabilitation   [facility] - Brain Injury Medicine   ____________________________________

## 2023-02-03 NOTE — THERAPY
"Physical Therapy   Daily Treatment     Patient Name: Kristin Balderrama  Age:  32 y.o., Sex:  female  Medical Record #: [id number]  Today's Date: 2/3/2023     Precautions  Precautions: Fall Risk, Weight Bearing As Tolerated Left Lower Extremity  Comments: POTS, Annetta-Danlos syndrome    Subjective    Pt seated in w/c upon arrival, agreeable to session.  \"Maybe we'll wait for a few more days before trying the cane again\"  \"I have to go home walking w/o any AD\"     Objective       02/03/23 1001   PT Charge Group   PT Gait Training (Units) 1   PT Therapeutic Activities (Units) 1   Supervising Physical Therapist Jorge Willams   PT Total Time Spent   PT Individual Total Time Spent (Mins) 30   Gait Functional Level of Assist    Gait Level Of Assist Standby Assist   Assistive Device Front Wheel Walker   Distance (Feet) 140   # of Times Distance was Traveled 1   Deviation Antalgic;Decreased Heel Strike;Decreased Toe Off   Wheelchair Functional Level of Assist   Wheelchair Assist Supervised   Distance Wheelchair (Feet or Distance) 140   Wheelchair Description Supervision for safety   Transfer Functional Level of Assist   Bed, Chair, Wheelchair Transfer Standby Assist   Bed Chair Wheelchair Transfer Description Adaptive equipment;Supervision for safety  (bed rail)   Bed Mobility    Supine to Sit Supervised   Sit to Supine Supervised   Sit to Stand Standby Assist  (FWW)   Interdisciplinary Plan of Care Collaboration   IDT Collaboration with  Nursing   Patient Position at End of Therapy In Bed;Call Light within Reach;Tray Table within Reach;Phone within Reach     Gait training w/ WBQC for 10ft w/ CGA, LOB at the end d/t bilateral knees aruna, modA to recover.    Assessment    Pt's mobility fluctuates d/t her pain, at he beginning of session she stated just received pain med and was ambulating w/ FWW from room > back gym w/o any sign of losing balance and was ambulating w/ good christina. Agreeable to trial WBQC and is CGA until " the buckling and she stated the pain is 9/10 from L hip. Almost LOB again while xfering back to bed.    Strengths: Able to follow instructions, Adequate strength, Alert and oriented, Effective communication skills, Good carryover of learning, Good insight into deficits/needs, Independent prior level of function, Manages pain appropriately, Motivated for self care and independence, Pleasant and cooperative, Supportive family, Willingly participates in therapeutic activities  Barriers: Decreased endurance, Fatigue, Impaired activity tolerance, Impaired balance, Limited mobility, Pain (hx of POTS with frequent falls at home)    Plan    Gait with FWW, standing balance, stairs/curb training, energy conservation, LE strengthening, monitor dizziness with position changes d/t hx of POTS     Passport items to be completed:  Get in/out of bed safely, in/out of a vehicle, safely use mobility device, walk or wheel around home/community, navigate up and down stairs, show how to get up/down from the ground, ensure home is accessible, demonstrate HEP, complete caregiver training    Physical Therapy Problems (Active)       Problem: Mobility       Dates: Start: 02/01/23         Goal: STG-Within one week, patient will ambulate household distance 50 ft x2 with FWW and SBA.       Dates: Start: 02/01/23         Goal Note filed on 02/02/23 1349 by Luz Maria Acuña, CATARINA       Up to 85 ft x3 with CGA              Goal: STG-Within one week, patient will ambulate up/down a curb with FWW and CGA.       Dates: Start: 02/01/23         Goal Note filed on 02/02/23 1349 by Luz Maria Acuña, PT       Initiated curb step training today              Goal: STG-Within one week, patient will ascend and descend four to six stairs with min A and BHR as needed.       Dates: Start: 02/01/23         Goal Note filed on 02/02/23 1349 by Luz Maria Acuña, PT       Not yet initiated                 Problem: Mobility Transfers       Dates: Start: 02/01/23         Goal:  STG-Within one week, patient will transfer bed to chair with FWW and SBA.       Dates: Start: 02/01/23         Goal Note filed on 02/02/23 1349 by Luz Maria Acuña PT       CGA                 Problem: PT-Long Term Goals       Dates: Start: 02/01/23         Goal: LTG-By discharge, patient will ambulate 150 ft x2 with FWW vs LRAD and SBA.       Dates: Start: 02/01/23            Goal: LTG-By discharge, patient will transfer one surface to another with FWW vs LRAD and SPV.       Dates: Start: 02/01/23            Goal: LTG-By discharge, patient will ambulate up/down flight of stairs with SBA and RHR.       Dates: Start: 02/01/23            Goal: LTG-By discharge, patient will transfer in/out of a car with FWW vs LRAD and set up assist.       Dates: Start: 02/01/23

## 2023-02-03 NOTE — CARE PLAN
The patient is Stable - Low risk of patient condition declining or worsening    Shift Goals  Clinical Goals: Safety  Patient Goals: Sleep well, pain control    Progress made toward(s) clinical / shift goals:    Problem: Knowledge Deficit - Standard  Goal: Patient and family/care givers will demonstrate understanding of plan of care, disease process/condition, diagnostic tests and medications  Outcome: Progressing     Problem: Pain - Standard  Goal: Alleviation of pain or a reduction in pain to the patient’s comfort goal  Outcome: Progressing. Patient stated pain over shift was tolerable and did not request any prn dilaudid. Rated pain over night at a 3-4/10 to left lateral leg incision area, and to posterior medial thigh area. Pt stated if she needed pain medication she will call staff.      Problem: Fall Risk - Rehab  Goal: Patient will remain free from falls  Outcome: Progressing. Patient bed in lowest locked position with bed alarm on and call light within reach. Patient not impulsive this shift. Uses FWW to transfer to bathroom with staff, CGA.

## 2023-02-03 NOTE — THERAPY
Occupational Therapy  Daily Treatment     Patient Name: Kristin Balderrama  Age:  32 y.o., Sex:  female  Medical Record #: [id number]  Today's Date: 2/3/2023     Precautions  Precautions: Fall Risk, Weight Bearing As Tolerated Left Lower Extremity  Comments: POTS, Annetta-Danlos syndrome         Subjective    Patient was seated in w/c on phone upon arrival. Patient was agreeable to OT session and mentioned that she slept better last night. She stated that a nurse touched her L hip to wake her up this morning which was painful. She agreed to a shower for afternoon session.      Objective       02/03/23 0901   Functional Level of Assist   Toileting Supervision   Toileting Description Grab bar;Supervision for safety;Set-up of equipment   Toilet Transfers Standby Assist   Toilet Transfer Description Grab bar;Supervision for safety;Set-up of equipment;Verbal cueing   Interdisciplinary Plan of Care Collaboration   IDT Collaboration with  Nursing   Patient Position at End of Therapy Seated;Call Light within Reach;Tray Table within Reach;Phone within Reach   Collaboration Comments nursing brought her morning meds along with pain meds     Core strengthening exercises EOM. Patient completed 3 x 10 of A/P leans with SBA. Patient completed 5 x 10 of tossing a 4lbs medicine ball back and forth to therapist.    Functional mobility with FWW from/to room and main therapy gym with CGA.         Assessment    Patient tolerated session well and was pleasant. Patient had no LOB during functional mobility. Patient had good sitting balance during EOM activities.    Strengths: Able to follow instructions, Adequate strength, Alert and oriented, Effective communication skills, Good insight into deficits/needs, Independent prior level of function, Motivated for self care and independence, Pleasant and cooperative, Willingly participates in therapeutic activities  Barriers: Decreased endurance, Fatigue, Impaired activity tolerance, Impaired  Pressure channel 1 zeroed. balance, Pain, Orthostatic hypotension    Plan    Shower this afternoon. ADLs, IADLs, core strengthening, UE strengthening, activity tolerance.    Occupational Therapy Goals (Active)       Problem: Bathing       Dates: Start: 02/01/23         Goal: STG-Within one week, patient will bathe with supervision and AE as needed.       Dates: Start: 02/01/23         Goal Note filed on 02/02/23 1137 by Sesar Leach, OT/L       Min A                 Problem: Dressing       Dates: Start: 02/01/23         Goal: STG-Within one week, patient will retrieve clothing with supervision and AE as needed.       Dates: Start: 02/01/23         Goal Note filed on 02/02/23 1137 by Sesar Leach, OT/L       SBA/CGA with FWW                 Problem: Functional Transfers       Dates: Start: 02/01/23         Goal: STG-Within one week, patient will transfer to toilet with SBA.       Dates: Start: 02/01/23         Goal Note filed on 02/02/23 1137 by Sesar Leach, OT/L       CGA                  Problem: OT Long Term Goals       Dates: Start: 02/01/23         Goal: LTG-By discharge, patient will complete basic self care tasks with supervision.       Dates: Start: 02/01/23            Goal: LTG-By discharge, patient will perform bathroom transfers with supervision.       Dates: Start: 02/01/23            Goal: LTG-By discharge, patient will complete basic home management with supervision.       Dates: Start: 02/01/23

## 2023-02-03 NOTE — ASSESSMENT & PLAN NOTE
Pt reports h/o chronic intermittent CP  Trop x 2 negative  EKG x 2 NSR  CXR hypoinflation o/w negative acute  KUB non-obstructive bowel gas w/ moderate stool  Start IS, bowel regimen per Physiatry  Pt declines further work up and prefers to F/U w/ her Cardiologist

## 2023-02-04 ENCOUNTER — ANCILLARY PROCEDURE (OUTPATIENT)
Dept: CARDIOLOGY | Facility: REHABILITATION | Age: 33
DRG: 560 | End: 2023-02-04
Attending: PHYSICAL MEDICINE & REHABILITATION
Payer: MEDICARE

## 2023-02-04 LAB
EKG IMPRESSION: NORMAL
TROPONIN T SERPL-MCNC: <6 NG/L (ref 6–19)

## 2023-02-04 PROCEDURE — 770010 HCHG ROOM/CARE - REHAB SEMI PRIVAT*

## 2023-02-04 PROCEDURE — 97530 THERAPEUTIC ACTIVITIES: CPT

## 2023-02-04 PROCEDURE — 93971 EXTREMITY STUDY: CPT | Mod: LT

## 2023-02-04 PROCEDURE — 97116 GAIT TRAINING THERAPY: CPT

## 2023-02-04 PROCEDURE — 97110 THERAPEUTIC EXERCISES: CPT

## 2023-02-04 PROCEDURE — 700102 HCHG RX REV CODE 250 W/ 637 OVERRIDE(OP): Performed by: PHYSICAL MEDICINE & REHABILITATION

## 2023-02-04 PROCEDURE — 36415 COLL VENOUS BLD VENIPUNCTURE: CPT

## 2023-02-04 PROCEDURE — 99232 SBSQ HOSP IP/OBS MODERATE 35: CPT | Performed by: HOSPITALIST

## 2023-02-04 PROCEDURE — A9270 NON-COVERED ITEM OR SERVICE: HCPCS | Performed by: PHYSICAL MEDICINE & REHABILITATION

## 2023-02-04 PROCEDURE — 700111 HCHG RX REV CODE 636 W/ 250 OVERRIDE (IP): Performed by: PHYSICAL MEDICINE & REHABILITATION

## 2023-02-04 PROCEDURE — 93010 ELECTROCARDIOGRAM REPORT: CPT | Performed by: INTERNAL MEDICINE

## 2023-02-04 PROCEDURE — 97535 SELF CARE MNGMENT TRAINING: CPT

## 2023-02-04 PROCEDURE — 84484 ASSAY OF TROPONIN QUANT: CPT

## 2023-02-04 RX ADMIN — METHOCARBAMOL 500 MG: 500 TABLET ORAL at 20:07

## 2023-02-04 RX ADMIN — METHOCARBAMOL 500 MG: 500 TABLET ORAL at 14:51

## 2023-02-04 RX ADMIN — HYDROMORPHONE HYDROCHLORIDE 2 MG: 2 TABLET ORAL at 00:21

## 2023-02-04 RX ADMIN — OMEPRAZOLE 20 MG: 20 CAPSULE, DELAYED RELEASE ORAL at 07:53

## 2023-02-04 RX ADMIN — ENOXAPARIN SODIUM 40 MG: 40 INJECTION SUBCUTANEOUS at 17:37

## 2023-02-04 RX ADMIN — GABAPENTIN 300 MG: 300 CAPSULE ORAL at 14:51

## 2023-02-04 RX ADMIN — HYDROMORPHONE HYDROCHLORIDE 2 MG: 2 TABLET ORAL at 08:58

## 2023-02-04 RX ADMIN — OXYCODONE HYDROCHLORIDE 10 MG: 10 TABLET ORAL at 05:28

## 2023-02-04 RX ADMIN — MAGNESIUM HYDROXIDE 30 ML: 1200 LIQUID ORAL at 07:52

## 2023-02-04 RX ADMIN — TRAZODONE HYDROCHLORIDE 100 MG: 100 TABLET ORAL at 20:07

## 2023-02-04 RX ADMIN — GABAPENTIN 300 MG: 300 CAPSULE ORAL at 07:52

## 2023-02-04 RX ADMIN — HYDROMORPHONE HYDROCHLORIDE 2 MG: 2 TABLET ORAL at 14:58

## 2023-02-04 RX ADMIN — Medication 10.5 MG: at 20:07

## 2023-02-04 RX ADMIN — SENNOSIDES AND DOCUSATE SODIUM 2 TABLET: 50; 8.6 TABLET ORAL at 20:07

## 2023-02-04 RX ADMIN — Medication 2000 UNITS: at 07:52

## 2023-02-04 RX ADMIN — BISACODYL 10 MG: 10 SUPPOSITORY RECTAL at 18:02

## 2023-02-04 RX ADMIN — DIPHENHYDRAMINE HCL 50 MG: 25 TABLET ORAL at 20:10

## 2023-02-04 RX ADMIN — ZIPRASIDONE HYDROCHLORIDE 40 MG: 40 CAPSULE ORAL at 20:07

## 2023-02-04 RX ADMIN — OXYCODONE HYDROCHLORIDE 10 MG: 10 TABLET ORAL at 11:59

## 2023-02-04 RX ADMIN — ACETAMINOPHEN 650 MG: 325 TABLET ORAL at 14:58

## 2023-02-04 RX ADMIN — OXYCODONE HYDROCHLORIDE 10 MG: 10 TABLET ORAL at 20:08

## 2023-02-04 RX ADMIN — METHOCARBAMOL 500 MG: 500 TABLET ORAL at 07:52

## 2023-02-04 RX ADMIN — SENNOSIDES AND DOCUSATE SODIUM 2 TABLET: 50; 8.6 TABLET ORAL at 07:53

## 2023-02-04 RX ADMIN — METOPROLOL SUCCINATE 25 MG: 25 TABLET, EXTENDED RELEASE ORAL at 07:53

## 2023-02-04 RX ADMIN — GABAPENTIN 300 MG: 300 CAPSULE ORAL at 20:07

## 2023-02-04 RX ADMIN — ONDANSETRON 4 MG: 4 TABLET, ORALLY DISINTEGRATING ORAL at 11:32

## 2023-02-04 ASSESSMENT — ACTIVITIES OF DAILY LIVING (ADL)
TOILET_TRANSFER_DESCRIPTION: GRAB BAR;SUPERVISION FOR SAFETY
TOILETING_LEVEL_OF_ASSIST_DESCRIPTION: GRAB BAR;INCREASED TIME
BED_CHAIR_WHEELCHAIR_TRANSFER_DESCRIPTION: INCREASED TIME;SUPERVISION FOR SAFETY

## 2023-02-04 ASSESSMENT — PAIN DESCRIPTION - PAIN TYPE
TYPE: ACUTE PAIN
TYPE: ACUTE PAIN

## 2023-02-04 ASSESSMENT — ENCOUNTER SYMPTOMS
POLYDIPSIA: 0
MUSCULOSKELETAL NEGATIVE: 1
COUGH: 0
NAUSEA: 0
BRUISES/BLEEDS EASILY: 0
ABDOMINAL PAIN: 0
CHILLS: 0
FEVER: 0
SHORTNESS OF BREATH: 0
PALPITATIONS: 0
EYES NEGATIVE: 1
VOMITING: 0

## 2023-02-04 ASSESSMENT — GAIT ASSESSMENTS
GAIT LEVEL OF ASSIST: STANDBY ASSIST
ASSISTIVE DEVICE: 4 WHEEL WALKER
DISTANCE (FEET): 1000

## 2023-02-04 ASSESSMENT — PAIN SCALES - WONG BAKER: WONGBAKER_NUMERICALRESPONSE: DOESN'T HURT AT ALL

## 2023-02-04 NOTE — CARE PLAN
The patient is Stable - Low risk of patient condition declining or worsening    Shift Goals  Clinical Goals: pain control  Patient Goals: pain free    Progress made toward(s) clinical / shift goals:  medicated patient with prns as needed for left hip pain. Left hip sx incision with 3 sutures, well approximated and MARK, no drainage noted. Took weekly photos and uploaded to EMR.     Problem: Pain - Standard  Goal: Alleviation of pain or a reduction in pain to the patient’s comfort goal  Outcome: Progressing     Problem: Skin Integrity  Goal: Skin integrity is maintained or improved  Outcome: Progressing

## 2023-02-04 NOTE — FLOWSHEET NOTE
02/03/23 1938   Events/Summary/Plan   Events/Summary/Plan SpO2 check   Vital Signs   Pulse 72   Respiration 18   Pulse Oximetry 95 %   $ Pulse Oximetry (Spot Check) Yes   Oxygen   O2 Delivery Device None - Room Air

## 2023-02-04 NOTE — THERAPY
"Physical Therapy   Daily Treatment     Patient Name: Kristin Balderrama  Age:  32 y.o., Sex:  female  Medical Record #: [id number]  Today's Date: 2/4/2023     Precautions  Precautions: (P) Fall Risk, Weight Bearing As Tolerated Left Lower Extremity  Comments: POTS, Annetta-Danlos syndrome, schizophrenia    Subjective    Pt received in bed asleep. When awoken she was agreeable to therapy. Pt reports during session that she does better when practicing things she has to do at home.      Objective       02/04/23 1331   PT Charge Group   PT Gait Training (Units) 2   PT Therapeutic Exercise (Units) 1   PT Therapeutic Activities (Units) 1   PT Total Time Spent   PT Individual Total Time Spent (Mins) 60   Precautions   Precautions Fall Risk;Weight Bearing As Tolerated Left Lower Extremity   Gait Functional Level of Assist    Gait Level Of Assist Standby Assist   Assistive Device 4 Wheel Walker   Distance (Feet) 1000   # of Times Distance was Traveled 1   Standing Lower Body Exercises   Standing Lower Body Exercises Yes   Comments see note   Bed Mobility    Supine to Sit Supervised   Sit to Supine Supervised   Sit to Stand Supervised     Therapeutic exercise:in // bars UE support   - standing heel lifts to toe lifts 3 x 15 bilateral  -standing alternating marching 3 x 15 bilateral  -standing straight leg flexion 3 x 15 bilateral  - standing straight leg abduction 3 x15 repeat bilateral  - standing straight leg hip extension 3 x 15 repeat bilateral   - standing balance no UE support 1 min x 2 moderate excursions     Therapeutic activity   - standing no UE support during clothing manipulation SBA, minimal postural corrections during this task  - stair training 16 6\" steps with bilateral hand rails step to pattern SBA   - toileting SUPV     Gait training:  Transitioned to a 4WW pt ambulated indoors and outdoors for over 20 minutes SBA.   - educated on use of 4WW brakes when stopping.     Assessment    Pt did very well " transitioning to the 4WW over outdoor surfaces.     Strengths: Able to follow instructions, Adequate strength, Alert and oriented, Effective communication skills, Good carryover of learning, Good insight into deficits/needs, Independent prior level of function, Manages pain appropriately, Motivated for self care and independence, Pleasant and cooperative, Supportive family, Willingly participates in therapeutic activities  Barriers: Decreased endurance, Fatigue, Impaired activity tolerance, Impaired balance, Limited mobility, Pain (hx of POTS with frequent falls at home)    Plan    Gait with FWW, standing balance, stairs/curb training, energy conservation, LE strengthening, monitor dizziness with position changes d/t hx of POTS     Passport items to be completed:  Get in/out of bed safely, in/out of a vehicle, safely use mobility device, walk or wheel around home/community, navigate up and down stairs, show how to get up/down from the ground, ensure home is accessible, demonstrate HEP, complete caregiver training    Physical Therapy Problems (Active)       Problem: Mobility       Dates: Start: 02/01/23         Goal: STG-Within one week, patient will ambulate household distance 50 ft x2 with FWW and SBA.       Dates: Start: 02/01/23         Goal Note filed on 02/02/23 1349 by Luz Maria Acuña, PT       Up to 85 ft x3 with CGA              Goal: STG-Within one week, patient will ambulate up/down a curb with FWW and CGA.       Dates: Start: 02/01/23         Goal Note filed on 02/02/23 1349 by Luz Maria Acuña, PT       Initiated curb step training today              Goal: STG-Within one week, patient will ascend and descend four to six stairs with min A and BHR as needed.       Dates: Start: 02/01/23         Goal Note filed on 02/02/23 1349 by Luz Maria Acuña, PT       Not yet initiated                 Problem: Mobility Transfers       Dates: Start: 02/01/23         Goal: STG-Within one week, patient will transfer bed to chair  with FWW and SBA.       Dates: Start: 02/01/23         Goal Note filed on 02/02/23 1349 by Luz Maria Acuña, PT       CGA                 Problem: PT-Long Term Goals       Dates: Start: 02/01/23         Goal: LTG-By discharge, patient will ambulate 150 ft x2 with FWW vs LRAD and SBA.       Dates: Start: 02/01/23            Goal: LTG-By discharge, patient will transfer one surface to another with FWW vs LRAD and SPV.       Dates: Start: 02/01/23            Goal: LTG-By discharge, patient will ambulate up/down flight of stairs with SBA and RHR.       Dates: Start: 02/01/23            Goal: LTG-By discharge, patient will transfer in/out of a car with FWW vs LRAD and set up assist.       Dates: Start: 02/01/23

## 2023-02-04 NOTE — PROGRESS NOTES
Rapid response called 1330. Patient participating in therapy and developed chest pain. Dr. Simmons and Dr. Tineo in attendance. Order for STAT CBC CMP Troponins, CXR, EKG, and KUB ordered.

## 2023-02-04 NOTE — THERAPY
"Occupational Therapy  Daily Treatment     Patient Name: Kristin Balderrama  Age:  32 y.o., Sex:  female  Medical Record #: [id number]  Today's Date: 2/4/2023     Precautions  Precautions: (P) Fall Risk, Weight Bearing As Tolerated Left Lower Extremity  Comments: POTS, Annetta-Danlos syndrome, schizophrenia    Safety   ADL Safety : (P) Requires Supervision for Safety  Bathroom Safety: (P) Requires Supervision for Safety    Subjective    \"My outpatient PT told me I had the worst balance in [place].\"      Objective       02/04/23 0931   OT Charge Group   Charges Yes   OT Self Care / ADL (Units) 2   OT Therapy Activity (Units) 1   OT Therapeutic Exercise (Units) 1   OT Total Time Spent   OT Individual Total Time Spent (Mins) 60   Precautions   Precautions Fall Risk;Weight Bearing As Tolerated Left Lower Extremity   Safety    ADL Safety  Requires Supervision for Safety   Bathroom Safety Requires Supervision for Safety   Cognition    Cognition / Consciousness WDL   Level of Consciousness Alert   Functional Level of Assist   Grooming Modified Independent;Seated   Upper Body Dressing Supervision   Upper Body Dressing Description Increased time   Lower Body Dressing Standby Assist   Lower Body Dressing Description Dressing stick;Reacher;Shoe horn;Sock aid   Toileting Supervision   Toileting Description Grab bar;Increased time   Bed, Chair, Wheelchair Transfer Standby Assist   Bed Chair Wheelchair Transfer Description Increased time;Supervision for safety   Toilet Transfers Standby Assist   Toilet Transfer Description Grab bar;Supervision for safety   Sitting Upper Body Exercises   Sitting Upper Body Exercises Yes   Chest Press 3 sets of 10;Bilateral  (20#)   Tricep Press 3 sets of 10;Bilateral  (25# backward/forward rickshaw)   IADL Treatments   Home Management Laundry transported on back of w/c; patient loaded washer at a SUP level from standing with use of w/c brakes.  Patient folded and put away clean clothing at a SUP level " seated EOB.   Balance   Sitting Balance (Static) Good   Sitting Balance (Dynamic) Fair +   Standing Balance (Static) Fair -   Standing Balance (Dynamic) Fair -   Weight Shift Sitting Good   Weight Shift Standing Poor   Skilled Intervention Compensatory Strategies   Comments Single anterior LOB with CGA correction with STS transfer   Bed Mobility    Supine to Sit Supervised   Interdisciplinary Plan of Care Collaboration   IDT Collaboration with  Certified Nursing Assistant   Patient Position at End of Therapy Seated;Chair Alarm On         Assessment    Patient demonstrating good overall activity tolerance and limited c/o pain during standing and seated pursuits (pain med provided ~ 30 min prior to tx time).  One anterior LOB with STS transfer with FWW with self-righting. S/U/SUP provided for ADL routine with patient utilizing AE for LB dressing and opting for w/c for IADL laundry activity secondary to balance concerns.     Strengths: Able to follow instructions, Adequate strength, Alert and oriented, Effective communication skills, Good insight into deficits/needs, Independent prior level of function, Motivated for self care and independence, Pleasant and cooperative, Willingly participates in therapeutic activities  Barriers: Decreased endurance, Fatigue, Impaired activity tolerance, Impaired balance, Pain, Orthostatic hypotension    Plan    Continue OT POC with focus on light meal prep with LRD, balance building, standing tolerance, ADL retraining     Passport items to be completed:  Perform bathroom transfers, complete dressing, complete feeding, get ready for the day, prepare a simple meal, participate in household tasks, adapt home for safety needs, demonstrate home exercise program, complete caregiver training     Occupational Therapy Goals (Active)       Problem: Bathing       Dates: Start: 02/01/23         Goal: STG-Within one week, patient will bathe with supervision and AE as needed.       Dates: Start:  02/01/23         Goal Note filed on 02/02/23 1137 by Sesar Leach, OT/L       Min A                 Problem: Dressing       Dates: Start: 02/01/23         Goal: STG-Within one week, patient will retrieve clothing with supervision and AE as needed.       Dates: Start: 02/01/23         Goal Note filed on 02/02/23 1137 by Sesar Leach, OT/L       SBA/CGA with FWW                 Problem: Functional Transfers       Dates: Start: 02/01/23         Goal: STG-Within one week, patient will transfer to toilet with SBA.       Dates: Start: 02/01/23         Goal Note filed on 02/02/23 1137 by Sesar Leach, OT/L       CGA                  Problem: OT Long Term Goals       Dates: Start: 02/01/23         Goal: LTG-By discharge, patient will complete basic self care tasks with supervision.       Dates: Start: 02/01/23            Goal: LTG-By discharge, patient will perform bathroom transfers with supervision.       Dates: Start: 02/01/23            Goal: LTG-By discharge, patient will complete basic home management with supervision.       Dates: Start: 02/01/23

## 2023-02-04 NOTE — PROGRESS NOTES
"Hospital Medicine Daily Progress Note        Chief Complaint  Chest pain    Interval Problem Update  Pt reports having had on and off chest pain overnight and states that \"this is just part of life for me.\"  Recommended further work up but pt declines and says this is \"normal\" for her.  Lab and imaging results reviewed and discussed.    Review of Systems  Review of Systems   Constitutional:  Negative for chills and fever.   HENT: Negative.     Eyes: Negative.    Respiratory:  Negative for cough and shortness of breath.    Cardiovascular:  Positive for chest pain. Negative for palpitations.   Gastrointestinal:  Negative for abdominal pain, nausea and vomiting.   Genitourinary: Negative.    Musculoskeletal: Negative.    Skin:  Negative for itching and rash.   Endo/Heme/Allergies:  Negative for polydipsia. Does not bruise/bleed easily.      Physical Exam  Temp:  [36.3 °C (97.4 °F)-36.7 °C (98.1 °F)] 36.6 °C (97.9 °F)  Pulse:  [72-76] 76  Resp:  [12-20] 16  BP: ()/(52-72) 103/62  SpO2:  [92 %-97 %] 94 %    Physical Exam  Vitals reviewed.   Constitutional:       General: She is not in acute distress.     Appearance: Normal appearance. She is not ill-appearing.   HENT:      Head: Normocephalic and atraumatic.      Right Ear: External ear normal.      Left Ear: External ear normal.      Nose: Nose normal.      Mouth/Throat:      Pharynx: Oropharynx is clear.   Eyes:      General:         Right eye: No discharge.         Left eye: No discharge.      Extraocular Movements: Extraocular movements intact.      Conjunctiva/sclera: Conjunctivae normal.   Cardiovascular:      Rate and Rhythm: Normal rate and regular rhythm.   Pulmonary:      Effort: Pulmonary effort is normal. No respiratory distress.      Breath sounds: Normal breath sounds. No wheezing.   Abdominal:      General: Bowel sounds are normal. There is no distension.      Palpations: Abdomen is soft.      Tenderness: There is no abdominal tenderness. "   Musculoskeletal:      Cervical back: Normal range of motion and neck supple.      Right lower leg: No edema.      Left lower leg: No edema.   Skin:     General: Skin is warm and dry.   Neurological:      Mental Status: She is alert and oriented to person, place, and time.       Fluids  No intake or output data in the 24 hours ending 02/04/23 0952    Laboratory  Recent Labs     02/03/23  1445   WBC 4.9   RBC 4.06*   HEMOGLOBIN 12.5   HEMATOCRIT 36.7*   MCV 90.4   MCH 30.8   MCHC 34.1   RDW 40.3   PLATELETCT 162*   MPV 11.6     Recent Labs     02/03/23  1445   SODIUM 136   POTASSIUM 4.2   CHLORIDE 102   CO2 22   GLUCOSE 107*   BUN 13   CREATININE 0.76   CALCIUM 9.5                 Assessment/Plan  Thrombocytopenia (HCC)  Assessment & Plan  Mild and improving  Follow PLT    S/p left hip fracture- (present on admission)  Assessment & Plan  Had GLF 2/2 POTS w/ left hip fx  S/P percutaneous fixation on 1/28/23 by Dr. Abdul  Wound care and pain control per Physiatry    Postural orthostatic tachycardia syndrome- (present on admission)  Assessment & Plan  Echo EF 55%  On Toprol and Corlanor  Cardiology F/U    Moderate asthma- (present on admission)  Assessment & Plan  RT protocol    Chest pain  Assessment & Plan  Pt reports h/o chronic intermittent CP  Trop x 2 negative  EKG x 2 NSR  CXR hypoinflation o/w negative acute  KUB non-obstructive bowel gas w/ moderate stool  Start IS, bowel regimen per Physiatry  Pt declines further work up and prefers to F/U w/ her Cardiologist    Schizoaffective disorder, depressive type (HCC)- (present on admission)  Assessment & Plan  On Geodon    Vitamin D deficiency- (present on admission)  Assessment & Plan  Vit D level 15  On supplementation       Full Code    Pt w/o acute medical issues requiring Hospitalist services at this time.  Will sign off.  Please re-consult as needed.

## 2023-02-04 NOTE — CARE PLAN
The patient is Stable - Low risk of patient condition declining or worsening    Shift Goals  Clinical Goals: Safety  Patient Goals: Sleep well, pain control    Progress made toward(s) clinical / shift goals:    Problem: Knowledge Deficit - Standard  Goal: Patient and family/care givers will demonstrate understanding of plan of care, disease process/condition, diagnostic tests and medications  Outcome: Progressing     Problem: Pain - Standard  Goal: Alleviation of pain or a reduction in pain to the patient’s comfort goal  Outcome: Progressing. Patient states pain controlled over night with use of oxycodone and dilaudid PRN.      Problem: Fall Risk - Rehab  Goal: Patient will remain free from falls  Outcome: Progressing. Bed in lowest locked position with bed alarm on and call light within reach. Patient calls for assistance effectively using call light.

## 2023-02-05 PROCEDURE — 770010 HCHG ROOM/CARE - REHAB SEMI PRIVAT*

## 2023-02-05 PROCEDURE — A9270 NON-COVERED ITEM OR SERVICE: HCPCS | Performed by: PHYSICAL MEDICINE & REHABILITATION

## 2023-02-05 PROCEDURE — 97110 THERAPEUTIC EXERCISES: CPT

## 2023-02-05 PROCEDURE — 700102 HCHG RX REV CODE 250 W/ 637 OVERRIDE(OP): Performed by: PHYSICAL MEDICINE & REHABILITATION

## 2023-02-05 PROCEDURE — 94760 N-INVAS EAR/PLS OXIMETRY 1: CPT

## 2023-02-05 PROCEDURE — 700111 HCHG RX REV CODE 636 W/ 250 OVERRIDE (IP): Performed by: PHYSICAL MEDICINE & REHABILITATION

## 2023-02-05 PROCEDURE — 94640 AIRWAY INHALATION TREATMENT: CPT

## 2023-02-05 PROCEDURE — 97535 SELF CARE MNGMENT TRAINING: CPT

## 2023-02-05 RX ADMIN — SENNOSIDES AND DOCUSATE SODIUM 2 TABLET: 50; 8.6 TABLET ORAL at 19:55

## 2023-02-05 RX ADMIN — METHOCARBAMOL 500 MG: 500 TABLET ORAL at 15:00

## 2023-02-05 RX ADMIN — GABAPENTIN 300 MG: 300 CAPSULE ORAL at 19:54

## 2023-02-05 RX ADMIN — BENZOCAINE AND MENTHOL 1 LOZENGE: 15; 3.6 LOZENGE ORAL at 13:39

## 2023-02-05 RX ADMIN — OXYCODONE HYDROCHLORIDE 10 MG: 10 TABLET ORAL at 10:00

## 2023-02-05 RX ADMIN — GABAPENTIN 300 MG: 300 CAPSULE ORAL at 15:00

## 2023-02-05 RX ADMIN — Medication 2000 UNITS: at 08:47

## 2023-02-05 RX ADMIN — GABAPENTIN 300 MG: 300 CAPSULE ORAL at 08:47

## 2023-02-05 RX ADMIN — SENNOSIDES AND DOCUSATE SODIUM 2 TABLET: 50; 8.6 TABLET ORAL at 08:47

## 2023-02-05 RX ADMIN — DIPHENHYDRAMINE HCL 50 MG: 25 TABLET ORAL at 19:54

## 2023-02-05 RX ADMIN — METHOCARBAMOL 500 MG: 500 TABLET ORAL at 19:54

## 2023-02-05 RX ADMIN — OXYCODONE HYDROCHLORIDE 10 MG: 10 TABLET ORAL at 17:21

## 2023-02-05 RX ADMIN — ZIPRASIDONE HYDROCHLORIDE 40 MG: 40 CAPSULE ORAL at 19:53

## 2023-02-05 RX ADMIN — ENOXAPARIN SODIUM 40 MG: 40 INJECTION SUBCUTANEOUS at 17:21

## 2023-02-05 RX ADMIN — TRAZODONE HYDROCHLORIDE 100 MG: 100 TABLET ORAL at 19:54

## 2023-02-05 RX ADMIN — HYDROMORPHONE HYDROCHLORIDE 2 MG: 2 TABLET ORAL at 00:58

## 2023-02-05 RX ADMIN — OXYCODONE HYDROCHLORIDE 10 MG: 10 TABLET ORAL at 13:09

## 2023-02-05 RX ADMIN — HYDROMORPHONE HYDROCHLORIDE 2 MG: 2 TABLET ORAL at 19:54

## 2023-02-05 RX ADMIN — OMEPRAZOLE 20 MG: 20 CAPSULE, DELAYED RELEASE ORAL at 08:47

## 2023-02-05 RX ADMIN — Medication 10.5 MG: at 19:53

## 2023-02-05 RX ADMIN — METHOCARBAMOL 500 MG: 500 TABLET ORAL at 08:47

## 2023-02-05 RX ADMIN — ALBUTEROL SULFATE 2 PUFF: 90 AEROSOL, METERED RESPIRATORY (INHALATION) at 15:56

## 2023-02-05 RX ADMIN — METOPROLOL SUCCINATE 25 MG: 25 TABLET, EXTENDED RELEASE ORAL at 08:46

## 2023-02-05 ASSESSMENT — PATIENT HEALTH QUESTIONNAIRE - PHQ9
1. LITTLE INTEREST OR PLEASURE IN DOING THINGS: NOT AT ALL
SUM OF ALL RESPONSES TO PHQ9 QUESTIONS 1 AND 2: 0
2. FEELING DOWN, DEPRESSED, IRRITABLE, OR HOPELESS: NOT AT ALL
SUM OF ALL RESPONSES TO PHQ9 QUESTIONS 1 AND 2: 0
1. LITTLE INTEREST OR PLEASURE IN DOING THINGS: NOT AT ALL
2. FEELING DOWN, DEPRESSED, IRRITABLE, OR HOPELESS: NOT AT ALL

## 2023-02-05 ASSESSMENT — ACTIVITIES OF DAILY LIVING (ADL)
TOILET_TRANSFER_DESCRIPTION: GRAB BAR;INCREASED TIME;SUPERVISION FOR SAFETY
BED_CHAIR_WHEELCHAIR_TRANSFER_DESCRIPTION: INCREASED TIME;SUPERVISION FOR SAFETY
TOILETING_LEVEL_OF_ASSIST_DESCRIPTION: GRAB BAR;INCREASED TIME;SUPERVISION FOR SAFETY
TUB_SHOWER_TRANSFER_DESCRIPTION: GRAB BAR;SHOWER BENCH;SUPERVISION FOR SAFETY

## 2023-02-05 ASSESSMENT — PAIN DESCRIPTION - PAIN TYPE: TYPE: ACUTE PAIN

## 2023-02-05 ASSESSMENT — PAIN SCALES - WONG BAKER: WONGBAKER_NUMERICALRESPONSE: DOESN'T HURT AT ALL

## 2023-02-05 NOTE — FLOWSHEET NOTE
02/05/23 1551   Events/Summary/Plan   Events/Summary/Plan o2 spot check   Vital Signs   Pulse 75   Respiration 18   Pulse Oximetry 97 %   $ Pulse Oximetry (Spot Check) Yes   Respiratory Assessment   Level of Consciousness Alert   Chest Exam   Work Of Breathing / Effort Within Normal Limits   Breath Sounds   RUL Breath Sounds Diminished  (Breath sounds increaed after tx.)   RML Breath Sounds Diminished   RLL Breath Sounds Diminished   MARY Breath Sounds Diminished   LLL Breath Sounds Diminished   Oxygen   O2 Delivery Device None - Room Air  (was placed on 2 lpm for SOB at this time, pt given 2 puffs ATT)

## 2023-02-05 NOTE — CARE PLAN
The patient is Stable - Low risk of patient condition declining or worsening    Shift Goals  Clinical Goals: pain control  Patient Goals: pain free    Problem: Fall Risk - Rehab  Goal: Patient will remain free from falls  Outcome: Progressing  Note: Leonor Simon Fall risk Assessment Score: 5    Low fall risk interventions   - Call light within reach   - Yellow  socks   - Belongings within reach   - Bed in the lowest position     Problem: Skin Integrity  Goal: Patient's skin integrity will be maintained or improve  Outcome: Progressing  Note:   Shay Score: 20    Patient's skin remains intact and free from new or accidental injury this shift; no s/s of infection. RN wound protocol checked. Encouraged hydration and educated about the importance of nutrition to keep skin integrity. Will continue to monitor.

## 2023-02-05 NOTE — CARE PLAN
Problem: Fall Risk - Rehab  Goal: Patient will remain free from falls  Outcome: Progressing   The patient is Stable - Low risk of patient condition declining or worsening    Shift Goals  Clinical Goals: pain control  Patient Goals: pain free      Problem: Pain - Standard  Goal: Alleviation of pain or a reduction in pain to the patient’s comfort goal  Outcome: Progressing  Medicated x 2 for left hip pain with good relief.   :

## 2023-02-05 NOTE — THERAPY
"Occupational Therapy  Daily Treatment     Patient Name: Kristin Balderrama  Age:  32 y.o., Sex:  female  Medical Record #: [id number]  Today's Date: 2/5/2023     Precautions  Precautions: (P) Fall Risk, Weight Bearing As Tolerated Left Lower Extremity  Comments: (P) POTS, Annetta-Danlos syndrome, schizophrenia    Safety   ADL Safety : (P) Requires Supervision for Safety  Bathroom Safety: (P) Requires Supervision for Safety    Subjective    Pt found asleep in bed and agreeable to therapy. She said, \"I have terrible depth perception that makes it hard to see sometimes.\"     Objective       02/05/23 1031   OT Charge Group   OT Self Care / ADL (Units) 2   OT Therapeutic Exercise (Units) 2   OT Total Time Spent   OT Individual Total Time Spent (Mins) 60   Precautions   Precautions Fall Risk;Weight Bearing As Tolerated Left Lower Extremity   Comments POTS, Annetta-Danlos syndrome, schizophrenia   Safety    ADL Safety  Requires Supervision for Safety   Bathroom Safety Requires Supervision for Safety   Vitals   Pulse 75   Pulse Oximetry 95 %   O2 Delivery Device None - Room Air   Vitals Comments after ambulation room ->gym w/o C/o of dizziness or fatigue   Pain 0 - 10 Group   Location Hip   Location Orientation Left   Description Aching   Functional Level of Assist   Bathing Standby Assist   Bathing Description Grab bar;Hand held shower;Long handled bath tool;Increased time;Supervision for safety   Upper Body Dressing Supervision   Upper Body Dressing Description Increased time;Supervision for safety   Lower Body Dressing Standby Assist   Lower Body Dressing Description Dressing stick;Sock aid;Shoe horn  (shoe horn to doff socks and aid to don)   Toileting Supervision   Toileting Description Grab bar;Increased time;Supervision for safety   Bed, Chair, Wheelchair Transfer Standby Assist   Bed Chair Wheelchair Transfer Description Increased time;Supervision for safety  (rollator)   Toilet Transfers Standby Assist   Toilet " "Transfer Description Grab bar;Increased time;Supervision for safety   Tub / Shower Transfers Standby Assist   Tub Shower Transfer Description Grab bar;Shower bench;Supervision for safety   Balance   Sitting Balance (Static) Good   Sitting Balance (Dynamic) Fair +   Standing Balance (Static) Fair -   Standing Balance (Dynamic) Fair -   Weight Shift Sitting Good   Bed Mobility    Supine to Sit Supervised   Sit to Supine Supervised   Scooting Supervised   Rolling Standby Assist   Interdisciplinary Plan of Care Collaboration   Patient Position at End of Therapy Seated  (pt positioned at table in cafeteria with all needs met and CNA present)     Pt participated in functional mobility to transition from room to gym w/ FWW and SBA/CGA. Pt participated in core/endurance/balance in sitting progressing to standing to catch weighted ball and theraband w/o LOB    Assessment    Pt tolerated therapy fairly well focused on ADL, endurance and strength. She requested RB when and reports that she does not cooking/meal prep at home \"but should probably learn.\" Pt left happy with all needs met in cafeteria.     Strengths: Able to follow instructions, Adequate strength, Alert and oriented, Effective communication skills, Good insight into deficits/needs, Independent prior level of function, Motivated for self care and independence, Pleasant and cooperative, Willingly participates in therapeutic activities  Barriers: Decreased endurance, Fatigue, Impaired activity tolerance, Impaired balance, Pain, Orthostatic hypotension    Plan    Continue OT POC with focus on light meal prep with LRD, balance building, standing tolerance, ADL retraining      Passport items to be completed:  Perform bathroom transfers, complete dressing, complete feeding, get ready for the day, prepare a simple meal, participate in household tasks, adapt home for safety needs, demonstrate home exercise program, complete caregiver training        Occupational Therapy " Goals (Active)       Problem: Bathing       Dates: Start: 02/01/23         Goal: STG-Within one week, patient will bathe with supervision and AE as needed.       Dates: Start: 02/01/23         Goal Note filed on 02/02/23 1137 by Sesar Leach, OT/L       Min A                 Problem: Dressing       Dates: Start: 02/01/23         Goal: STG-Within one week, patient will retrieve clothing with supervision and AE as needed.       Dates: Start: 02/01/23         Goal Note filed on 02/02/23 1137 by Sesar Leach, OT/L       SBA/CGA with FWW                 Problem: Functional Transfers       Dates: Start: 02/01/23         Goal: STG-Within one week, patient will transfer to toilet with SBA.       Dates: Start: 02/01/23         Goal Note filed on 02/02/23 1137 by Sesar Leach, OT/L       CGA                  Problem: OT Long Term Goals       Dates: Start: 02/01/23         Goal: LTG-By discharge, patient will complete basic self care tasks with supervision.       Dates: Start: 02/01/23            Goal: LTG-By discharge, patient will perform bathroom transfers with supervision.       Dates: Start: 02/01/23            Goal: LTG-By discharge, patient will complete basic home management with supervision.       Dates: Start: 02/01/23

## 2023-02-06 LAB — S PYO DNA SPEC NAA+PROBE: NOT DETECTED

## 2023-02-06 PROCEDURE — 97535 SELF CARE MNGMENT TRAINING: CPT

## 2023-02-06 PROCEDURE — 700102 HCHG RX REV CODE 250 W/ 637 OVERRIDE(OP): Performed by: PHYSICAL MEDICINE & REHABILITATION

## 2023-02-06 PROCEDURE — 97110 THERAPEUTIC EXERCISES: CPT

## 2023-02-06 PROCEDURE — A9270 NON-COVERED ITEM OR SERVICE: HCPCS | Performed by: PHYSICAL MEDICINE & REHABILITATION

## 2023-02-06 PROCEDURE — 700111 HCHG RX REV CODE 636 W/ 250 OVERRIDE (IP): Performed by: PHYSICAL MEDICINE & REHABILITATION

## 2023-02-06 PROCEDURE — 99232 SBSQ HOSP IP/OBS MODERATE 35: CPT | Performed by: PHYSICAL MEDICINE & REHABILITATION

## 2023-02-06 PROCEDURE — 770010 HCHG ROOM/CARE - REHAB SEMI PRIVAT*

## 2023-02-06 PROCEDURE — 87651 STREP A DNA AMP PROBE: CPT

## 2023-02-06 PROCEDURE — 97116 GAIT TRAINING THERAPY: CPT

## 2023-02-06 PROCEDURE — 97530 THERAPEUTIC ACTIVITIES: CPT

## 2023-02-06 PROCEDURE — 97112 NEUROMUSCULAR REEDUCATION: CPT

## 2023-02-06 RX ADMIN — METHOCARBAMOL 500 MG: 500 TABLET ORAL at 08:18

## 2023-02-06 RX ADMIN — Medication 2000 UNITS: at 08:18

## 2023-02-06 RX ADMIN — SENNOSIDES AND DOCUSATE SODIUM 2 TABLET: 50; 8.6 TABLET ORAL at 08:17

## 2023-02-06 RX ADMIN — GABAPENTIN 300 MG: 300 CAPSULE ORAL at 08:18

## 2023-02-06 RX ADMIN — OMEPRAZOLE 20 MG: 20 CAPSULE, DELAYED RELEASE ORAL at 08:18

## 2023-02-06 RX ADMIN — Medication 10.5 MG: at 20:58

## 2023-02-06 RX ADMIN — GABAPENTIN 300 MG: 300 CAPSULE ORAL at 15:11

## 2023-02-06 RX ADMIN — BENZOCAINE AND MENTHOL 1 LOZENGE: 15; 3.6 LOZENGE ORAL at 13:31

## 2023-02-06 RX ADMIN — METHOCARBAMOL 500 MG: 500 TABLET ORAL at 15:11

## 2023-02-06 RX ADMIN — TRAZODONE HYDROCHLORIDE 100 MG: 100 TABLET ORAL at 20:59

## 2023-02-06 RX ADMIN — OXYCODONE HYDROCHLORIDE 10 MG: 10 TABLET ORAL at 15:11

## 2023-02-06 RX ADMIN — SENNOSIDES AND DOCUSATE SODIUM 2 TABLET: 50; 8.6 TABLET ORAL at 20:58

## 2023-02-06 RX ADMIN — METOPROLOL SUCCINATE 25 MG: 25 TABLET, EXTENDED RELEASE ORAL at 08:18

## 2023-02-06 RX ADMIN — GABAPENTIN 300 MG: 300 CAPSULE ORAL at 20:58

## 2023-02-06 RX ADMIN — OXYCODONE HYDROCHLORIDE 10 MG: 10 TABLET ORAL at 11:58

## 2023-02-06 RX ADMIN — HYDROMORPHONE HYDROCHLORIDE 2 MG: 2 TABLET ORAL at 08:18

## 2023-02-06 RX ADMIN — METHOCARBAMOL 500 MG: 500 TABLET ORAL at 20:58

## 2023-02-06 RX ADMIN — ZIPRASIDONE HYDROCHLORIDE 40 MG: 40 CAPSULE ORAL at 20:58

## 2023-02-06 RX ADMIN — HYDROMORPHONE HYDROCHLORIDE 2 MG: 2 TABLET ORAL at 18:24

## 2023-02-06 RX ADMIN — ENOXAPARIN SODIUM 40 MG: 40 INJECTION SUBCUTANEOUS at 18:24

## 2023-02-06 ASSESSMENT — GAIT ASSESSMENTS
DEVIATION: BRADYKINETIC;DECREASED HEEL STRIKE;DECREASED TOE OFF
ASSISTIVE DEVICE: 4 WHEEL WALKER
ASSISTIVE DEVICE: 4 WHEEL WALKER
DISTANCE (FEET): 500
DEVIATION: DECREASED BASE OF SUPPORT;BRADYKINETIC;DECREASED HEEL STRIKE;DECREASED TOE OFF
DISTANCE (FEET): 850
GAIT LEVEL OF ASSIST: SUPERVISED
GAIT LEVEL OF ASSIST: SUPERVISED

## 2023-02-06 ASSESSMENT — ACTIVITIES OF DAILY LIVING (ADL)
BED_CHAIR_WHEELCHAIR_TRANSFER_DESCRIPTION: ADAPTIVE EQUIPMENT
TOILETING_LEVEL_OF_ASSIST_DESCRIPTION: ADAPTIVE EQUIPMENT;GRAB BAR
BED_CHAIR_WHEELCHAIR_TRANSFER_DESCRIPTION: ADAPTIVE EQUIPMENT;SUPERVISION FOR SAFETY
TUB_SHOWER_TRANSFER_DESCRIPTION: GRAB BAR;SHOWER BENCH;SUPERVISION FOR SAFETY
TOILET_TRANSFER_DESCRIPTION: GRAB BAR;ADAPTIVE EQUIPMENT;INCREASED TIME
BED_CHAIR_WHEELCHAIR_TRANSFER_DESCRIPTION: ADAPTIVE EQUIPMENT

## 2023-02-06 NOTE — CARE PLAN
The patient is Stable - Low risk of patient condition declining or worsening    Shift Goals  Clinical Goals: pain control  Patient Goals: pain free      Problem: Skin Integrity  Goal: Skin integrity is maintained or improved  Outcome: Progressing  Note:   Shay Score: 20    Patient's skin remains intact and free from new or accidental injury this shift; no s/s of infection. RN wound protocol checked. Encouraged hydration and educated about the importance of nutrition to keep skin integrity. Will continue to monitor.       Problem: Fall Risk - Rehab  Goal: Patient will remain free from falls  Outcome: Progressing  Note: Leonor Simon Fall risk Assessment Score: 5    Low fall risk interventions   - Call light within reach   - Yellow  socks   - Belongings within reach   - Bed in the lowest position

## 2023-02-06 NOTE — THERAPY
Occupational Therapy  Daily Treatment     Patient Name: Kristin Balderrama  Age:  32 y.o., Sex:  female  Medical Record #: [id number]  Today's Date: 2/6/2023     Precautions  Precautions: (P) Fall Risk, Weight Bearing As Tolerated Left Lower Extremity  Comments: (P) POTS, Annetta-Danlos syndrome, schizophrenia    Safety   ADL Safety : Requires Supervision for Safety  Bathroom Safety: Requires Supervision for Safety    Subjective    Patient was supine in bed and using her phone upon OT arrival.  She was agreeable to OT and requested to shower and do laundry.     Objective       02/06/23 1031   OT Charge Group   OT Self Care / ADL (Units) 2   OT Therapy Activity (Units) 1   OT Therapeutic Exercise (Units) 1   OT Total Time Spent   OT Individual Total Time Spent (Mins) 60   Precautions   Precautions Fall Risk;Weight Bearing As Tolerated Left Lower Extremity   Comments POTS, Annetta-Danlos syndrome, schizophrenia   Functional Level of Assist   Grooming Supervision;Standing   Bathing Supervision   Bathing Description Grab bar;Hand held shower;Tub bench;Supervision for safety   Upper Body Dressing Supervision   Upper Body Dressing Description Assist device equipment  (4ww to gather)   Lower Body Dressing Minimal Assist   Lower Body Dressing Description Dressing stick;Reacher;Shoe horn;Sock aid;Supervision for safety;Assistive devices  (4ww; assist with L shoe; VC's for adaptive techniques)   Bed, Chair, Wheelchair Transfer Supervised   Bed Chair Wheelchair Transfer Description Adaptive equipment  (4ww)   Tub / Shower Transfers Supervised   Tub Shower Transfer Description Grab bar;Shower bench;Supervision for safety   IADL Treatments   Home Management Patient stood at washing machine, loaded dirty clothes, soap and started machine with supervision/mod I.   Sitting Lower Body Exercises   Sitting Lower Body Exercises Yes   Nustep Time (See Comments)  (nustep level 2 x 10 minutes with LEs only)   Bed Mobility    Supine to Sit  Modified Independent   Scooting Modified Independent   Interdisciplinary Plan of Care Collaboration   Patient Position at End of Therapy Seated  (in dining room for lunch)     Functional mobility with 4ww with SBA/supervision from room to laundry room and gym to dining room.    Transferred on/off nustep with SBA and 4ww.    Assessment    Patient had no losses of balance throughout session today.  Her confidence in her own balance has improved as well.  She feels like she will be ready to d/c home on Friday. She reported increased hip pain after using the nustep.  Strengths: Able to follow instructions, Adequate strength, Alert and oriented, Effective communication skills, Good insight into deficits/needs, Independent prior level of function, Motivated for self care and independence, Pleasant and cooperative, Willingly participates in therapeutic activities  Barriers: Decreased endurance, Fatigue, Impaired activity tolerance, Impaired balance, Pain, Orthostatic hypotension    Plan    ADLs, IADLs, core strength, UE strength, standing balance/tolerance.     Occupational Therapy Goals (Active)       Problem: Bathing       Dates: Start: 02/01/23         Goal: STG-Within one week, patient will bathe with supervision and AE as needed.       Dates: Start: 02/01/23         Goal Note filed on 02/02/23 1137 by Sesar Leach, OT/L       Min A                 Problem: Dressing       Dates: Start: 02/01/23         Goal: STG-Within one week, patient will retrieve clothing with supervision and AE as needed.       Dates: Start: 02/01/23         Goal Note filed on 02/02/23 1137 by Sesar Leach, OT/L       SBA/CGA with FWW                 Problem: Functional Transfers       Dates: Start: 02/01/23         Goal: STG-Within one week, patient will transfer to toilet with SBA.       Dates: Start: 02/01/23         Goal Note filed on 02/02/23 1137 by Sesar Leach, OT/L       CGA                  Problem: OT Long Term Goals        Dates: Start: 02/01/23         Goal: LTG-By discharge, patient will complete basic self care tasks with supervision.       Dates: Start: 02/01/23            Goal: LTG-By discharge, patient will perform bathroom transfers with supervision.       Dates: Start: 02/01/23            Goal: LTG-By discharge, patient will complete basic home management with supervision.       Dates: Start: 02/01/23

## 2023-02-06 NOTE — PROGRESS NOTES
"  Physical Medicine & Rehabilitation Progress Note    Encounter Date: 2/6/2023    Chief Complaint: Decreased mobility    Interval Events (Subjective):  Patient sitting up in room. She reports her chest pain went away Friday night and has not returned. She reports now having a sore throat. She reports a history of strep throat. Discussed would check for strep throat. Denies fever or chills Denies cough. Troponins were negative on Friday and Saturday. CXR showed hypoinflation but no acute process.    _____________________________________  Interdisciplinary Team Conference   Most recent IDT on 2/2/2023     Discharge Date/Disposition:  2/10/23  _____________________________________    Objective:  VITAL SIGNS: /73   Pulse 65   Temp 36.4 °C (97.5 °F) (Oral)   Resp 20   Ht 1.626 m (5' 4\")   Wt 107 kg (235 lb 14.3 oz)   SpO2 95%   BMI 40.49 kg/m²   Gen: NAD  Psych: Mood and affect appropriate  CV: RRR, 1+ edema  Resp: CTAB, no upper airway sounds  Abd: NTND  Neuro: AOx4, following commands    Laboratory Values:  Recent Results (from the past 72 hour(s))   CBC WITH DIFFERENTIAL    Collection Time: 02/03/23  2:45 PM   Result Value Ref Range    WBC 4.9 4.8 - 10.8 K/uL    RBC 4.06 (L) 4.20 - 5.40 M/uL    Hemoglobin 12.5 12.0 - 16.0 g/dL    Hematocrit 36.7 (L) 37.0 - 47.0 %    MCV 90.4 81.4 - 97.8 fL    MCH 30.8 27.0 - 33.0 pg    MCHC 34.1 33.6 - 35.0 g/dL    RDW 40.3 35.9 - 50.0 fL    Platelet Count 162 (L) 164 - 446 K/uL    MPV 11.6 9.0 - 12.9 fL    Neutrophils-Polys 50.70 44.00 - 72.00 %    Lymphocytes 33.10 22.00 - 41.00 %    Monocytes 8.10 0.00 - 13.40 %    Eosinophils 5.50 0.00 - 6.90 %    Basophils 1.00 0.00 - 1.80 %    Immature Granulocytes 1.60 (H) 0.00 - 0.90 %    Nucleated RBC 0.00 /100 WBC    Neutrophils (Absolute) 2.50 2.00 - 7.15 K/uL    Lymphs (Absolute) 1.63 1.00 - 4.80 K/uL    Monos (Absolute) 0.40 0.00 - 0.85 K/uL    Eos (Absolute) 0.27 0.00 - 0.51 K/uL    Baso (Absolute) 0.05 0.00 - 0.12 K/uL    " Immature Granulocytes (abs) 0.08 0.00 - 0.11 K/uL    NRBC (Absolute) 0.00 K/uL   Comp Metabolic Panel    Collection Time: 23  2:45 PM   Result Value Ref Range    Sodium 136 135 - 145 mmol/L    Potassium 4.2 3.6 - 5.5 mmol/L    Chloride 102 96 - 112 mmol/L    Co2 22 20 - 33 mmol/L    Anion Gap 12.0 7.0 - 16.0    Glucose 107 (H) 65 - 99 mg/dL    Bun 13 8 - 22 mg/dL    Creatinine 0.76 0.50 - 1.40 mg/dL    Calcium 9.5 8.5 - 10.5 mg/dL    AST(SGOT) 14 12 - 45 U/L    ALT(SGPT) 25 2 - 50 U/L    Alkaline Phosphatase 63 30 - 99 U/L    Total Bilirubin 0.3 0.1 - 1.5 mg/dL    Albumin 4.1 3.2 - 4.9 g/dL    Total Protein 6.2 6.0 - 8.2 g/dL    Globulin 2.1 1.9 - 3.5 g/dL    A-G Ratio 2.0 g/dL   TROPONIN    Collection Time: 23  2:45 PM   Result Value Ref Range    Troponin T <6 6 - 19 ng/L   CORRECTED CALCIUM    Collection Time: 23  2:45 PM   Result Value Ref Range    Correct Calcium 9.4 8.5 - 10.5 mg/dL   ESTIMATED GFR    Collection Time: 23  2:45 PM   Result Value Ref Range    GFR (CKD-EPI) 106 >60 mL/min/1.73 m 2   EKG    Collection Time: 23  6:26 PM   Result Value Ref Range    Report       [facility]    Test Date:  2023  Pt Name:    HARLEY DE LA CRUZ              Department: [facility]  MRN:        [id number]                      Room:       [facility]  Gender:     Female                       Technician: [id number] WT  :        1990                   Requested By:GIBRAN MORGAN  Order #:    [id number]                    Kevin MD: Abhishek Andino MD    Measurements  Intervals                                Axis  Rate:       71                           P:          26  OK:         147                          QRS:        38  QRSD:       98                           T:          24  QT:         432  QTc:        470    Interpretive Statements  Sinus rhythm  Low voltage, precordial leads  Borderline T abnormalities, anterior leads  Compared to ECG 2023 13:43:15  Low QRS voltage now present  T-wave  abnormality now present  ST (T wave) deviation no longer present  Electronically Signed On 2-4-2023 6:48:50 PST by Abhishek Andino MD     TROPONIN    Collection Time: 02/04/23  6:06 AM   Result Value Ref Range    Troponin T <6 6 - 19 ng/L       Medications:  Scheduled Medications   Medication Dose Frequency    gabapentin  300 mg TID    vitamin D3  2,000 Units DAILY    senna-docusate  2 Tablet BID    omeprazole  20 mg DAILY    enoxaparin (LOVENOX) injection  40 mg DAILY AT 1800    ivabradine  7.5 mg BID WITH MEALS    melatonin  10.5 mg QHS    methocarbamol  500 mg TID    metoprolol SR  25 mg QAM    traZODone  100 mg QHS    ziprasidone  40 mg QHS     PRN medications: benzocaine-menthol, diphenhydrAMINE, Respiratory Therapy Consult, hydrALAZINE, acetaminophen, senna-docusate **AND** polyethylene glycol/lytes **AND** magnesium hydroxide **AND** bisacodyl, mag hydrox-al hydrox-simeth, ondansetron **OR** ondansetron, traZODone, sodium chloride, traMADol, acetaminophen, albuterol, ipratropium-albuterol, [DISCONTINUED] oxyCODONE immediate-release **OR** oxyCODONE immediate-release, HYDROmorphone    Diet:  Current Diet Order   Procedures    Diet Order Diet: Regular       Assessment:  Active Hospital Problems    Diagnosis     *Hip fracture, left, closed, initial encounter ([facility])     S/p left hip fracture     Moderate asthma     IBS (irritable bowel syndrome)     Schizoaffective disorder, depressive type ([facility])     TONYA (obstructive sleep apnea)     Morbidly obese ([facility])        Medical Decision Making and Plan:  Fall with left hip fracture - Patient s/p fall with hip fracture s/p IMN with Dr. Abdul on 1/28/23. Patient WBAT.  -PT and OT for mobility and ADLs. Per guidelines, 15 hours per week between PT, OT and SLP.  -Follow-up Dr. Abdul     Hyperglycemia - Not on any medication on transfer. Will check A1c - 5.1. No longer diabetic     Asthma - Patient on PRN Duoneb and Albuterol inhaler      POTS - Patient on Metoprolol 25  mg XL and Corlanor 7.5 mg BID     New chest pain - Rapid response on 2/3/23 for chest pain. EKG to bedside with sinus rhythm. Stat Troponin, CXR, CBC, CMP. Patient's case was discussed face to face with Hospitalist on [facility] floor. Will repeat Troponin in 6 hours. Patient at high risk for further injury due to history of POTS, obesity and antipsychotic usage which can affected QTc.   -Negative on work-up. Resolved on 2/3/23 repeat troponin negative.    Neuropathy - Patient on Gabapentin 100 mg TID -> 300 mg TID. High risk medication will need to monitor Cr. Will recheck      Thrombocytopenia - Check AM CBC - 154, stable. Continue to monitor     Morbid obesity due to excess calories - BMI of 39.7 on admission, meets medical criteria. Dietitian to consult     Schizophrenia - Patient on Geodon 40 mg QHS and Trazodone      Pain - APAP/Gabapentin/Oxycodone and Robaxin TID  -Change PRN to Oxycodone 10 mg for moderate and Dilaudid PO 2 mg q3h. As having severe pain and has constipation with oxycodone. Dilaudid is high risk medication discussed about previous confusion/agitation and will need to monitor in setting of psych history.      Sore throat - history of strep throat. Will check Strep PCR. Low threshold for check COVID. No other symptoms.     Skin - Patient at risk for skin breakdown due to debility in areas including sacrum, achilles, elbows and head in addition to other sites. Nursing to assess skin daily.      Vitamin D deficiency - acute on chronic. Start 2000 U     GI Ppx - Patient on Prilosec for GERD prophylaxis. Patient on Senna-docusate for constipation prophylaxis.      DVT Ppx - Patient Lovenox on transfer.  -Worsening left leg pain. Patient with decreased sensation and swelling to lower LE. Discussed stat DVT doppler. Did have SOB. Will check CXR. RT to give albuterol, if ongoing will get CTA PE. Patient at very high risk for DVT/PE due to fracture and obesity.   ____________________________________    T.  Dhruv Simmons MD./PhD.  [facility] - Physical Medicine & Rehabilitation   [facility] - Brain Injury Medicine   ____________________________________

## 2023-02-06 NOTE — THERAPY
"Occupational Therapy  Daily Treatment     Patient Name: Kristin Balderrama  Age:  32 y.o., Sex:  female  Medical Record #: [id number]  Today's Date: 2/6/2023     Precautions  Precautions: Fall Risk, Weight Bearing As Tolerated Left Lower Extremity  Comments: POTS, Annetta-Danlos syndrome, schizophrenia    Safety   ADL Safety : Requires Supervision for Safety  Bathroom Safety: Requires Supervision for Safety    Subjective    \"I don't cook that often, but people seem to like my cooking when I do. I made homemade spaghetti and it turned out really well but took like 2 hours.\"      Objective       02/06/23 1001   OT Charge Group   OT Therapy Activity (Units) 1   OT Therapeutic Exercise (Units) 1   OT Total Time Spent   OT Individual Total Time Spent (Mins) 30   Pain   Intervention Declines   Functional Level of Assist   Grooming Modified Independent   Toileting Supervision   Toileting Description Adaptive equipment;Grab bar  (4 WW)   Toilet Transfers Supervised   Toilet Transfer Description Grab bar;Adaptive equipment;Increased time  (4 WW)   Sitting Upper Body Exercises   Chest Press 2 sets of 10;Bilateral;Weight (See Comments for lbs)  (3 lb free weight)   Shoulder Press 2 sets of 10;Bilateral;Weight (See Comments for lbs)  (3 lb with free weights)   Bicep Curls 2 sets of 10;Bilateral;Weight (See Comments for lbs)  (3 lb free weight)   IADL Treatments   Kitchen Mobility Education edu pt on safety during kitchen mobility i.e. holding onto counter tops,sliding items across counter top, holding onto counter when reaching into high/low cabinets, keeping 4WW close and making sure to lock brakes. Pt completed tasks with SBA   Meal Preparation Pt engaged in making Jello on stove top with cues for finding needed kitchen items and SBA for safety. Pt demonstrated good safety awareness aeb making sure to turn off stove and keep items away from the hot stove   Home Management Pt engaged in cleaning up kitchen about light cooking " activity and putting items in  with SBA   Bed Mobility    Sit to Supine Supervised   Interdisciplinary Plan of Care Collaboration   Patient Position at End of Therapy In Bed;Call Light within Reach;Tray Table within Reach;Phone within Reach     Pt engaged in standing balance activity in // bars:   - ambulating down and back 2x in // w/o use of arm support and SBA  - standing on 1 leg at a time for ~15 seconds without arm support  - attempted standing w/ eyes closed- noted decreased balance with eyes closed- pt unable to maintain upright posture and required max a for balance     Assessment    Pt tolerated session well. Pt completed light cooking activity without use of 4WW, counter tops for support and SBA for safety with mobility. Pt demonstrated fair balance in // bars when arm support removed and unable to maintain balance with eyes closed without arm support.     Strengths: Able to follow instructions, Adequate strength, Alert and oriented, Effective communication skills, Good insight into deficits/needs, Independent prior level of function, Motivated for self care and independence, Pleasant and cooperative, Willingly participates in therapeutic activities  Barriers: Decreased endurance, Fatigue, Impaired activity tolerance, Impaired balance, Pain, Orthostatic hypotension    Plan    ADLs, IADLs, core strength, UE strength, standing balance/tolerance.     Occupational Therapy Goals (Active)       Problem: Bathing       Dates: Start: 02/01/23         Goal: STG-Within one week, patient will bathe with supervision and AE as needed.       Dates: Start: 02/01/23         Goal Note filed on 02/02/23 1137 by Sesar Leach, OT/L       Min A                 Problem: Dressing       Dates: Start: 02/01/23         Goal: STG-Within one week, patient will retrieve clothing with supervision and AE as needed.       Dates: Start: 02/01/23         Goal Note filed on 02/02/23 1137 by Sesar Leach, OT/L        SBA/CGA with FWW                 Problem: Functional Transfers       Dates: Start: 02/01/23         Goal: STG-Within one week, patient will transfer to toilet with SBA.       Dates: Start: 02/01/23         Goal Note filed on 02/02/23 1137 by Sesar Leach, OT/L       CGA                  Problem: OT Long Term Goals       Dates: Start: 02/01/23         Goal: LTG-By discharge, patient will complete basic self care tasks with supervision.       Dates: Start: 02/01/23            Goal: LTG-By discharge, patient will perform bathroom transfers with supervision.       Dates: Start: 02/01/23            Goal: LTG-By discharge, patient will complete basic home management with supervision.       Dates: Start: 02/01/23

## 2023-02-06 NOTE — THERAPY
"Physical Therapy   Daily Treatment     Patient Name: Kristin Balderrama  Age:  32 y.o., Sex:  female  Medical Record #: [id number]  Today's Date: 2/6/2023     Precautions  Precautions: Fall Risk, Weight Bearing As Tolerated Left Lower Extremity  Comments: POTS, Annetta-Danlos syndrome, schizophrenia    Subjective    Pt seen twice at 0831 and 1331. Received in bed prior to ea session, agreeable to participate.     0831: Denied current chest pain after episode of angina this weekend.    1331: Requested to check on laundry but laundry was not done by end of session.     Objective       02/06/23 0831 02/06/23 1331   PT Charge Group   PT Gait Training (Units) 1 1   PT Therapeutic Exercise (Units) 2 1   PT Neuromuscular Re-Education / Balance (Units) 1  --    PT Total Time Spent   PT Individual Total Time Spent (Mins) 60 30   Gait Functional Level of Assist    Gait Level Of Assist Supervised Supervised   Assistive Device 4 Wheel Walker 4 Wheel Walker   Distance (Feet) 500 850   # of Times Distance was Traveled 1  (plus 200 ft) 1  (indoors and outdoors; plus 200 ft x2)   Deviation Decreased Base Of Support;Bradykinetic;Decreased Heel Strike;Decreased Toe Off Bradykinetic;Decreased Heel Strike;Decreased Toe Off   Stairs Functional Level of Assist   Level of Assist with Stairs Contact Guard Assist  --    # of Stairs Climbed 12  (on 6\" stairs)  --    Stairs Description Extra time;Hand rails;Supervision for safety  (RHR only, step to pattern)  --    Transfer Functional Level of Assist   Bed, Chair, Wheelchair Transfer Supervised Modified Independent   Bed Chair Wheelchair Transfer Description Adaptive equipment;Supervision for safety  (stand step 4WW) Adaptive equipment  (stand step 4WW)   Supine Lower Body Exercise   Bridges Two Legged;3 sets of 10  --    Hip Abduction Hook Lying;3 sets of 10;Bilateral;Medium Resistance Theraband  --    Straight Leg Raises 3 sets of 10;Bilateral  --    Short Arc Quad 3 sets of 10;Bilateral  --  "   Other Exercises shuttle DL 6 bands 3x10, SL 5 bands 3x10 ea LE  --    Sitting Lower Body Exercises   Sit to Stand  --  3 sets of 10  (with BUE support >> 1 UE support plus holding 1 kg ball >> 1 UE support plus holding 3 kg ball >>)   Bed Mobility    Supine to Sit Supervised Modified Independent   Sit to Supine Supervised Modified Independent   Sit to Stand Supervised Modified Independent   Scooting Supervised Modified Independent   Neuro-Muscular Treatments   Neuro-Muscular Treatments Sequencing;Verbal Cuing;Weight Shift Right;Weight Shift Left  --    Comments Pregait/ dynamic standing balance training in // bars with no UE support. Performed forw and retro walking 10 ft x2 ea, side stepping 10 ft x3 ea direction  --    Interdisciplinary Plan of Care Collaboration   IDT Collaboration with  Occupational Therapist Occupational Therapist;Certified Nursing Assistant   Patient Position at End of Therapy Seated;Other (Comments)  (pt seated on 4WW in main gym for handoff to OT) In Bed;Call Light within Reach;Tray Table within Reach;Phone within Reach   Collaboration Comments handoff to OT in main gym with OT cleared pt mod I in the room with 4WW; notified CNA of mod I status     Pt requested no therapy this Thurs from 8-9am d/t telehealth appt, therapy  notified via scheduling request sheet.    Assessment    Pt with significantly improved gait quality and christina vs eval with this therapist last week. Pt still presents with lateral sway with 4WW but demonstrated good safety awareness/brake mgt during ambulation indoors and outdoors today. Pt pleased to be mod I in the room.    Strengths: Able to follow instructions, Adequate strength, Alert and oriented, Effective communication skills, Good carryover of learning, Good insight into deficits/needs, Independent prior level of function, Manages pain appropriately, Motivated for self care and independence, Pleasant and cooperative, Supportive family, Willingly  participates in therapeutic activities  Barriers: Decreased endurance, Fatigue, Impaired activity tolerance, Impaired balance, Limited mobility, Pain (hx of POTS with frequent falls at home)    Plan    Gait with 4WW, standing balance, stairs/curb training, energy conservation/endurance, LE strengthening, monitor dizziness with position changes d/t hx of POTS     Passport items to be completed:  Get in/out of bed safely, in/out of a vehicle, safely use mobility device, walk or wheel around home/community, navigate up and down stairs, show how to get up/down from the ground, ensure home is accessible, demonstrate HEP, complete caregiver training    Physical Therapy Problems (Active)       Problem: Mobility       Dates: Start: 02/01/23         Goal: STG-Within one week, patient will ambulate household distance 50 ft x2 with FWW and SBA.       Dates: Start: 02/01/23         Goal Note filed on 02/02/23 1349 by Luz Maria Acuña, PT       Up to 85 ft x3 with CGA              Goal: STG-Within one week, patient will ambulate up/down a curb with FWW and CGA.       Dates: Start: 02/01/23         Goal Note filed on 02/02/23 1349 by Luz Maria Acuña, PT       Initiated curb step training today              Goal: STG-Within one week, patient will ascend and descend four to six stairs with min A and BHR as needed.       Dates: Start: 02/01/23         Goal Note filed on 02/02/23 1349 by Luz Maria Acuña, PT       Not yet initiated                 Problem: Mobility Transfers       Dates: Start: 02/01/23         Goal: STG-Within one week, patient will transfer bed to chair with FWW and SBA.       Dates: Start: 02/01/23         Goal Note filed on 02/02/23 1349 by Luz Maria Acuña, PT       CGA                 Problem: PT-Long Term Goals       Dates: Start: 02/01/23         Goal: LTG-By discharge, patient will ambulate 150 ft x2 with FWW vs LRAD and SBA.       Dates: Start: 02/01/23            Goal: LTG-By discharge, patient will transfer one surface  to another with FWW vs LRAD and SPV.       Dates: Start: 02/01/23            Goal: LTG-By discharge, patient will ambulate up/down flight of stairs with SBA and RHR.       Dates: Start: 02/01/23            Goal: LTG-By discharge, patient will transfer in/out of a car with FWW vs LRAD and set up assist.       Dates: Start: 02/01/23

## 2023-02-07 PROCEDURE — 97110 THERAPEUTIC EXERCISES: CPT

## 2023-02-07 PROCEDURE — 97530 THERAPEUTIC ACTIVITIES: CPT

## 2023-02-07 PROCEDURE — 770010 HCHG ROOM/CARE - REHAB SEMI PRIVAT*

## 2023-02-07 PROCEDURE — 97116 GAIT TRAINING THERAPY: CPT

## 2023-02-07 PROCEDURE — 700111 HCHG RX REV CODE 636 W/ 250 OVERRIDE (IP): Performed by: PHYSICAL MEDICINE & REHABILITATION

## 2023-02-07 PROCEDURE — 97112 NEUROMUSCULAR REEDUCATION: CPT

## 2023-02-07 PROCEDURE — A9270 NON-COVERED ITEM OR SERVICE: HCPCS | Performed by: PHYSICAL MEDICINE & REHABILITATION

## 2023-02-07 PROCEDURE — 94760 N-INVAS EAR/PLS OXIMETRY 1: CPT

## 2023-02-07 PROCEDURE — 700102 HCHG RX REV CODE 250 W/ 637 OVERRIDE(OP): Performed by: PHYSICAL MEDICINE & REHABILITATION

## 2023-02-07 PROCEDURE — 97535 SELF CARE MNGMENT TRAINING: CPT

## 2023-02-07 PROCEDURE — 99232 SBSQ HOSP IP/OBS MODERATE 35: CPT | Performed by: PHYSICAL MEDICINE & REHABILITATION

## 2023-02-07 RX ORDER — GUAIFENESIN 600 MG/1
600 TABLET, EXTENDED RELEASE ORAL EVERY 12 HOURS PRN
Status: DISCONTINUED | OUTPATIENT
Start: 2023-02-07 | End: 2023-02-10 | Stop reason: HOSPADM

## 2023-02-07 RX ADMIN — TRAZODONE HYDROCHLORIDE 100 MG: 100 TABLET ORAL at 20:22

## 2023-02-07 RX ADMIN — SENNOSIDES AND DOCUSATE SODIUM 2 TABLET: 50; 8.6 TABLET ORAL at 08:12

## 2023-02-07 RX ADMIN — OXYCODONE HYDROCHLORIDE 10 MG: 10 TABLET ORAL at 04:42

## 2023-02-07 RX ADMIN — GABAPENTIN 300 MG: 300 CAPSULE ORAL at 20:22

## 2023-02-07 RX ADMIN — OXYCODONE HYDROCHLORIDE 10 MG: 10 TABLET ORAL at 14:23

## 2023-02-07 RX ADMIN — DIPHENHYDRAMINE HCL 50 MG: 25 TABLET ORAL at 20:21

## 2023-02-07 RX ADMIN — METHOCARBAMOL 500 MG: 500 TABLET ORAL at 15:31

## 2023-02-07 RX ADMIN — GABAPENTIN 300 MG: 300 CAPSULE ORAL at 08:13

## 2023-02-07 RX ADMIN — PHENOL 1 SPRAY: 1.5 LIQUID ORAL at 16:39

## 2023-02-07 RX ADMIN — SENNOSIDES AND DOCUSATE SODIUM 2 TABLET: 50; 8.6 TABLET ORAL at 20:21

## 2023-02-07 RX ADMIN — METHOCARBAMOL 500 MG: 500 TABLET ORAL at 20:21

## 2023-02-07 RX ADMIN — Medication 10.5 MG: at 20:22

## 2023-02-07 RX ADMIN — HYDROMORPHONE HYDROCHLORIDE 2 MG: 2 TABLET ORAL at 16:31

## 2023-02-07 RX ADMIN — BENZOCAINE AND MENTHOL 1 LOZENGE: 15; 3.6 LOZENGE ORAL at 07:50

## 2023-02-07 RX ADMIN — ENOXAPARIN SODIUM 40 MG: 40 INJECTION SUBCUTANEOUS at 17:55

## 2023-02-07 RX ADMIN — HYDROMORPHONE HYDROCHLORIDE 2 MG: 2 TABLET ORAL at 20:21

## 2023-02-07 RX ADMIN — METHOCARBAMOL 500 MG: 500 TABLET ORAL at 08:13

## 2023-02-07 RX ADMIN — Medication 2000 UNITS: at 08:13

## 2023-02-07 RX ADMIN — ZIPRASIDONE HYDROCHLORIDE 40 MG: 40 CAPSULE ORAL at 20:23

## 2023-02-07 RX ADMIN — GABAPENTIN 300 MG: 300 CAPSULE ORAL at 15:30

## 2023-02-07 RX ADMIN — OXYCODONE HYDROCHLORIDE 10 MG: 10 TABLET ORAL at 09:47

## 2023-02-07 RX ADMIN — OMEPRAZOLE 20 MG: 20 CAPSULE, DELAYED RELEASE ORAL at 08:14

## 2023-02-07 RX ADMIN — METOPROLOL SUCCINATE 25 MG: 25 TABLET, EXTENDED RELEASE ORAL at 08:13

## 2023-02-07 ASSESSMENT — ACTIVITIES OF DAILY LIVING (ADL)
BED_CHAIR_WHEELCHAIR_TRANSFER_DESCRIPTION: ADAPTIVE EQUIPMENT
TOILET_TRANSFER_DESCRIPTION: ADAPTIVE EQUIPMENT;GRAB BAR
TUB_SHOWER_TRANSFER_DESCRIPTION: GRAB BAR;SHOWER BENCH;SUPERVISION FOR SAFETY
BED_CHAIR_WHEELCHAIR_TRANSFER_DESCRIPTION: ADAPTIVE EQUIPMENT
BED_CHAIR_WHEELCHAIR_TRANSFER_DESCRIPTION: ADAPTIVE EQUIPMENT
TOILET_TRANSFER_DESCRIPTION: ADAPTIVE EQUIPMENT

## 2023-02-07 ASSESSMENT — GAIT ASSESSMENTS
DISTANCE (FEET): 160
DEVIATION: INCREASED BASE OF SUPPORT;BRADYKINETIC;DECREASED HEEL STRIKE;DECREASED TOE OFF
DEVIATION: ANTALGIC;INCREASED BASE OF SUPPORT;BRADYKINETIC;DECREASED HEEL STRIKE;DECREASED TOE OFF
ASSISTIVE DEVICE: 4 WHEEL WALKER
GAIT LEVEL OF ASSIST: MODIFIED INDEPENDENT
ASSISTIVE DEVICE: 4 WHEEL WALKER
GAIT LEVEL OF ASSIST: MODIFIED INDEPENDENT
DISTANCE (FEET): 150

## 2023-02-07 ASSESSMENT — PAIN DESCRIPTION - PAIN TYPE
TYPE: ACUTE PAIN

## 2023-02-07 NOTE — PROGRESS NOTES
"  Physical Medicine & Rehabilitation Progress Note    Encounter Date: 2/7/2023    Chief Complaint: Decreased mobility    Interval Events (Subjective):  Patient sitting up in room. She reports therapy is going well. She reports had chest pain last night but it quickly went away. She reports getting URI symptoms. She reports sore throat is still present. Discussed will give symptomatic treatment and add mucinex. Strep was negative.      _____________________________________  Interdisciplinary Team Conference   Most recent IDT on 2/2/2023     Discharge Date/Disposition:  2/10/23  _____________________________________    Objective:  VITAL SIGNS: BP 96/60   Pulse 90   Temp 36.5 °C (97.7 °F) (Oral)   Resp 18   Ht 1.626 m (5' 4\")   Wt 107 kg (235 lb 14.3 oz)   SpO2 98%   BMI 40.49 kg/m²   Gen: NAD  Psych: Mood and affect appropriate  CV: RRR, 1+ edema  Resp: CTAB, no upper airway sounds  Abd: NTND  Neuro: AOx4, following commands  Unchanged from 2/6/23    Laboratory Values:  Recent Results (from the past 72 hour(s))   Group A Strep by PCR    Collection Time: 02/06/23  1:30 PM    Specimen: Throat   Result Value Ref Range    Group A Strep by PCR Not Detected Not Detected       Medications:  Scheduled Medications   Medication Dose Frequency    gabapentin  300 mg TID    vitamin D3  2,000 Units DAILY    senna-docusate  2 Tablet BID    omeprazole  20 mg DAILY    enoxaparin (LOVENOX) injection  40 mg DAILY AT 1800    ivabradine  7.5 mg BID WITH MEALS    melatonin  10.5 mg QHS    methocarbamol  500 mg TID    metoprolol SR  25 mg QAM    traZODone  100 mg QHS    ziprasidone  40 mg QHS     PRN medications: benzocaine-menthol, diphenhydrAMINE, Respiratory Therapy Consult, hydrALAZINE, acetaminophen, senna-docusate **AND** polyethylene glycol/lytes **AND** magnesium hydroxide **AND** bisacodyl, mag hydrox-al hydrox-simeth, ondansetron **OR** ondansetron, traZODone, sodium chloride, traMADol, acetaminophen, albuterol, " ipratropium-albuterol, [DISCONTINUED] oxyCODONE immediate-release **OR** oxyCODONE immediate-release, HYDROmorphone    Diet:  Current Diet Order   Procedures    Diet Order Diet: Regular       Assessment:  Active Hospital Problems    Diagnosis     *Hip fracture, left, closed, initial encounter ([facility])     S/p left hip fracture     Moderate asthma     IBS (irritable bowel syndrome)     Schizoaffective disorder, depressive type ([facility])     TONYA (obstructive sleep apnea)     Morbidly obese ([facility])        Medical Decision Making and Plan:  Fall with left hip fracture - Patient s/p fall with hip fracture s/p IMN with Dr. Abdul on 1/28/23. Patient WBAT.  -PT and OT for mobility and ADLs. Per guidelines, 15 hours per week between PT, OT and SLP.  -Follow-up Dr. Abdul     Hyperglycemia - Not on any medication on transfer. Will check A1c - 5.1. No longer diabetic     Asthma - Patient on PRN Duoneb and Albuterol inhaler      POTS - Patient on Metoprolol 25 mg XL and Corlanor 7.5 mg BID     New chest pain - Rapid response on 2/3/23 for chest pain. EKG to bedside with sinus rhythm. Stat Troponin, CXR, CBC, CMP. Patient's case was discussed face to face with Hospitalist on [facility] floor. Will repeat Troponin in 6 hours. Patient at high risk for further injury due to history of POTS, obesity and antipsychotic usage which can affected QTc.   -Negative on work-up. Resolved on 2/3/23 repeat troponin negative.    Neuropathy - Patient on Gabapentin 100 mg TID -> 300 mg TID. High risk medication will need to monitor Cr. Will recheck      Thrombocytopenia - Check AM CBC - 154, stable. Continue to monitor     Morbid obesity due to excess calories - BMI of 39.7 on admission, meets medical criteria. Dietitian to consult     Schizophrenia - Patient on Geodon 40 mg QHS and Trazodone      Pain - APAP/Gabapentin/Oxycodone and Robaxin TID  -Change PRN to Oxycodone 10 mg for moderate and Dilaudid PO 2 mg q3h. As having severe pain and has constipation  with oxycodone. Dilaudid is high risk medication discussed about previous confusion/agitation and will need to monitor in setting of psych history.      Sore throat - history of strep throat. Will check Strep PCR. Low threshold for check COVID. No other symptoms.   -Strep negative. Start chloraseptic spray and mucinex. Recheck labs    Skin - Patient at risk for skin breakdown due to debility in areas including sacrum, achilles, elbows and head in addition to other sites. Nursing to assess skin daily.      Vitamin D deficiency - acute on chronic. Start 2000 U     GI Ppx - Patient on Prilosec for GERD prophylaxis. Patient on Senna-docusate for constipation prophylaxis.      DVT Ppx - Patient Lovenox on transfer.  -Worsening left leg pain. Patient with decreased sensation and swelling to lower LE. Discussed stat DVT doppler. Did have SOB. Will check CXR. RT to give albuterol, if ongoing will get CTA PE. Patient at very high risk for DVT/PE due to fracture and obesity.   ____________________________________    T. Dhruv Simmons MD./PhD.  [facility] - Physical Medicine & Rehabilitation   [facility] - Brain Injury Medicine   ____________________________________

## 2023-02-07 NOTE — CARE PLAN
The patient is Stable - Low risk of patient condition declining or worsening    Shift Goals  Clinical Goals: pain control  Patient Goals: pain free    Progress made toward(s) clinical / shift goals:    Problem: Skin Integrity  Goal: Skin integrity is maintained or improved  Note: Patient's skin remains intact and free from new or accidental injury this shift.  Will continue to monitor.      Problem: Pain - Standard  Goal: Alleviation of pain or a reduction in pain to the patient’s comfort goal  Note: Patient able to verbalize pain level and verbalize an acceptable level of pain.

## 2023-02-07 NOTE — THERAPY
Occupational Therapy  Daily Treatment     Patient Name: Kristin Balderrama  Age:  32 y.o., Sex:  female  Medical Record #: [id number]  Today's Date: 2/7/2023     Precautions  Precautions: Fall Risk, Weight Bearing As Tolerated Left Lower Extremity  Comments: POTS, Annetta-Danlos syndrome, schizophrenia    Safety   ADL Safety : Requires Supervision for Safety  Bathroom Safety: Requires Supervision for Safety    Subjective    Patient awake talking on phone upon arrival. Patient agreed to OT session and wanted to shower. Patient mentioned she feels like she is getting sick. She stated that she has pain in her axilla and that she gets infected axilla glands.      Objective       02/07/23 0701   OT Charge Group   OT Self Care / ADL (Units) 2   OT Therapy Activity (Units) 1   OT Therapeutic Exercise (Units) 1   OT Total Time Spent   OT Individual Total Time Spent (Mins) 60   Cosignature   Documentation Review Approved with changes as documented and edited in this column.    Precautions   Precautions Fall Risk;Weight Bearing As Tolerated Left Lower Extremity   Pain 0 - 10 Group   Location Axilla;Throat   Therapist Pain Assessment Prior to Activity;Nurse Notified   Functional Level of Assist   Grooming Supervision;Standing   Bathing Supervision   Bathing Description Grab bar;Hand held shower;Tub bench;Supervision for safety   Upper Body Dressing Supervision   Upper Body Dressing Description Supervision for safety;Assist device equipment  (4WW to gather clothes)   Lower Body Dressing Supervision   Lower Body Dressing Description Assistive devices;Dressing stick;Sock aid  (4WW to don/doff 17/17 tasks)   Bed, Chair, Wheelchair Transfer Modified Independent   Bed Chair Wheelchair Transfer Description Adaptive equipment  (4WW)   Tub / Shower Transfers Supervised   Tub Shower Transfer Description Grab bar;Shower bench;Supervision for safety   Sitting Upper Body Exercises   Upper Extremity Bike Level 4 Resistance  (motomed for 7  minutes with no rest breaks)   Bed Mobility    Supine to Sit Modified Independent   Scooting Modified Independent   Interdisciplinary Plan of Care Collaboration   IDT Collaboration with  Nursing   Patient Position at End of Therapy Tray Table within Reach;Phone within Reach;Call Light within Reach   Collaboration Comments nursing brought patient meds for throat and was notified about patient pain in axilla     Functional mobility to gather clothes and walk to the bathroom with 4WW and supervision. Patient walked with 4WW from room <> main gym.     Patient retrieved jello from refrigerator using 4WW with supervision.     Assessment    Patient tolerated session well. She tolerated higher resistance on motomed well. Patient uses AE for dressing and has good carry over. Patient had no LOB during session.     Strengths: Able to follow instructions, Adequate strength, Alert and oriented, Effective communication skills, Good insight into deficits/needs, Independent prior level of function, Motivated for self care and independence, Pleasant and cooperative, Willingly participates in therapeutic activities  Barriers: Decreased endurance, Fatigue, Impaired activity tolerance, Impaired balance, Pain, Orthostatic hypotension    Plan    ADLs, IADLs, functional mobility, UE strength, core strength.     Occupational Therapy Goals (Active)       Problem: Bathing       Dates: Start: 02/01/23         Goal: STG-Within one week, patient will bathe with supervision and AE as needed.       Dates: Start: 02/01/23         Goal Note filed on 02/02/23 1137 by Sesar Leach OT/GIULIANO       Min A                 Problem: Dressing       Dates: Start: 02/01/23         Goal: STG-Within one week, patient will retrieve clothing with supervision and AE as needed.       Dates: Start: 02/01/23         Goal Note filed on 02/02/23 1137 by Sesar Leach OT/GIULIANO       SBA/CGA with FWW                 Problem: Functional Transfers       Dates: Start:  02/01/23         Goal: STG-Within one week, patient will transfer to toilet with SBA.       Dates: Start: 02/01/23         Goal Note filed on 02/02/23 1137 by Sesar Leach OT/L       CGA                  Problem: OT Long Term Goals       Dates: Start: 02/01/23         Goal: LTG-By discharge, patient will complete basic self care tasks with supervision.       Dates: Start: 02/01/23            Goal: LTG-By discharge, patient will perform bathroom transfers with supervision.       Dates: Start: 02/01/23            Goal: LTG-By discharge, patient will complete basic home management with supervision.       Dates: Start: 02/01/23

## 2023-02-07 NOTE — THERAPY
"Physical Therapy   Daily Treatment     Patient Name: Kristin Balderrama  Age:  32 y.o., Sex:  female  Medical Record #: [id number]  Today's Date: 2/7/2023     Precautions  Precautions: Fall Risk, Weight Bearing As Tolerated Left Lower Extremity  Comments: POTS, Annetta-Danlos syndrome, schizophrenia    Subjective    Pt seen twice at 1031 and 1401.  Received in bed prior to both sessions, agreeable to participate.     1031: Reported that she thinks she is coming down with a cold. \"My body just wants to sleep.\" Denied any current chest pain.     1401: Reported that she had another episode of chest pain after lunch that had since resolved. Requested to cont working on ambulation/ standing balance.     Objective       02/07/23 1031 02/07/23 1401   PT Charge Group   PT Gait Training (Units) 1 2   PT Therapeutic Exercise (Units) 2  --    PT Neuromuscular Re-Education / Balance (Units) 1  --    PT Total Time Spent   PT Individual Total Time Spent (Mins) 60 30   Pain 0 - 10 Group   Location  --  Hip   Location Orientation  --  Left   Therapist Pain Assessment  --  Prior to Activity;During Activity;Post Activity;Nurse Notified   Gait Functional Level of Assist    Gait Level Of Assist Modified Independent Modified Independent   Assistive Device 4 Wheel Walker 4 Wheel Walker   Distance (Feet) 160 150   # of Times Distance was Traveled 1  (plus 120 ft x2 and bw activities throughout facility) 2  (plus gait training in // bars (see PT note))   Deviation Increased Base Of Support;Bradykinetic;Decreased Heel Strike;Decreased Toe Off  (inc B lateral sway d/t dec B hip flexion during swing) Antalgic;Increased Base Of Support;Bradykinetic;Decreased Heel Strike;Decreased Toe Off  (with 1 instance of LLE buckling d/t pain that pt self corrected, CGA for walk back to room for safety)   Stairs Functional Level of Assist   Level of Assist with Stairs Standby Assist  (approaching SPV)  --    # of Stairs Climbed 16  (on 6\" stairs)  --  "   Stairs Description Extra time;Hand rails;Supervision for safety  (RHR only, step to pattern)  --    Transfer Functional Level of Assist   Bed, Chair, Wheelchair Transfer Modified Independent Modified Independent   Bed Chair Wheelchair Transfer Description Adaptive equipment  (stand step 4WW) Adaptive equipment  (stand step 4WW)   Toilet Transfers Modified Independent Modified Independent   Toilet Transfer Description Adaptive equipment;Grab bar  (4WW<>toilet) Adaptive equipment  (4WW<>toilet)   Supine Lower Body Exercise   Other Exercises shuttle DL 7 bands 3x15, SL 7 bands 3x10 ea LE  --    Standing Lower Body Exercises   Other Exercises core strengthening via standing straight arm pulldown green TB 3x10, standing BUE press down on theraball with 3 sec iso hold 3x10  --    Bed Mobility    Supine to Sit Modified Independent Modified Independent   Sit to Supine  --  Modified Independent   Sit to Stand Modified Independent Modified Independent   Scooting Modified Independent Modified Independent  (in bed)   Neuro-Muscular Treatments   Neuro-Muscular Treatments Sequencing;Verbal Cuing;Weight Shift Right;Weight Shift Left Sequencing;Tactile Cuing;Verbal Cuing;Weight Shift Right;Weight Shift Left   Comments Pregait/ dynamic standing balance training in // bars with no UE support. Performed forw walking 12 ft x4, retro walking 12 ft x8, side stepping 12 ft x6 in both directions. Performed exaggerated marching with VC for inc B hip flexion and dec trunk/pelvic rotation 12 ft x4 Pregait/ dynamic standing balance training in // bars with no UE support. Performed step over dowel >> yoga block ea LE x15 times for inc hip flexion during ambulation. Performed forw walking 12 ft x16-20 laps with VC and mirror feedback for  inc B hip/knee flexion, dec trunk/pelvic rotation, inc B arm swing   Interdisciplinary Plan of Care Collaboration   IDT Collaboration with  Certified Nursing Assistant Nursing   Patient Position at End of  Therapy Seated;Other (Comments)  (in cafeteria) In Bed;Call Light within Reach;Tray Table within Reach;Phone within Reach   Collaboration Comments handoff to CNA in cafeteria RN gave pain meds per pt request     1031:  Performed floor recovery x1 with SBA. Pt crossed LLE under her when descending to floor but otherwise with good safety awareness on ascent. Pt politely declined to perform again   Gait training with no AD and CGA x260 ft, noted inc trunk/pelvic rotation with wide ISAAC    Assessment    Pt more limited in second vs first session by inc L hip pain/fatigue but participated to the best of her abilities. With 1 instance of LLE buckling d/t pain during walk back to room in second session that pt was able to self correct. Cont to present with inc B lateral sway during ambulation, however gait quality improved with VC and mirror feedback.     Strengths: Able to follow instructions, Adequate strength, Alert and oriented, Effective communication skills, Good carryover of learning, Good insight into deficits/needs, Independent prior level of function, Manages pain appropriately, Motivated for self care and independence, Pleasant and cooperative, Supportive family, Willingly participates in therapeutic activities  Barriers: Decreased endurance, Fatigue, Impaired activity tolerance, Impaired balance, Limited mobility, Pain (hx of POTS with frequent falls at home)    Plan    Gait with 4WW vs no AD, standing balance no UE support, stairs/curb training, energy conservation/endurance, LE strengthening, monitor dizziness with position changes d/t hx of POTS     Passport items to be completed:  Get in/out of bed safely, in/out of a vehicle, safely use mobility device, walk or wheel around home/community, navigate up and down stairs, show how to get up/down from the ground, ensure home is accessible, demonstrate HEP, complete caregiver training    Physical Therapy Problems (Active)       Problem: Mobility       Dates:  Start: 02/01/23         Goal: STG-Within one week, patient will ambulate household distance 50 ft x2 with FWW and SBA.       Dates: Start: 02/01/23         Goal Note filed on 02/02/23 1349 by Luz Maria Acuña, PT       Up to 85 ft x3 with CGA              Goal: STG-Within one week, patient will ambulate up/down a curb with FWW and CGA.       Dates: Start: 02/01/23         Goal Note filed on 02/02/23 1349 by Luz Maria Acuña, PT       Initiated curb step training today              Goal: STG-Within one week, patient will ascend and descend four to six stairs with min A and BHR as needed.       Dates: Start: 02/01/23         Goal Note filed on 02/02/23 1349 by Luz Maria Acuña, PT       Not yet initiated                 Problem: Mobility Transfers       Dates: Start: 02/01/23         Goal: STG-Within one week, patient will transfer bed to chair with FWW and SBA.       Dates: Start: 02/01/23         Goal Note filed on 02/02/23 1349 by Luz Maria Acuña, PT       CGA                 Problem: PT-Long Term Goals       Dates: Start: 02/01/23         Goal: LTG-By discharge, patient will ambulate 150 ft x2 with FWW vs LRAD and SBA.       Dates: Start: 02/01/23            Goal: LTG-By discharge, patient will transfer one surface to another with FWW vs LRAD and SPV.       Dates: Start: 02/01/23            Goal: LTG-By discharge, patient will ambulate up/down flight of stairs with SBA and RHR.       Dates: Start: 02/01/23            Goal: LTG-By discharge, patient will transfer in/out of a car with FWW vs LRAD and set up assist.       Dates: Start: 02/01/23

## 2023-02-07 NOTE — CARE PLAN
Problem: Pain - Standard  Goal: Alleviation of pain or a reduction in pain to the patient’s comfort goal  Outcome: Progressing   Patient able to verbalize pain on a 0/10 scale. Patients pain is being controlled with prn pain medications and rest.

## 2023-02-07 NOTE — PROGRESS NOTES
C/O not feeling well with chest type pain in jaw radiating down left arm. Elis HASSAN Supervisor notified Dr. Simmons and Dr. Tineo. No further orders at this time. VSS. 129/69, HR 72, Temp 97.5, R-20, 96% on room air. Dialudid given at 1820 for pain. Report given to night SONYA Valenzuela to monitor.

## 2023-02-07 NOTE — THERAPY
Occupational Therapy  Daily Treatment     Patient Name: Kristin Balderrama  Age:  32 y.o., Sex:  female  Medical Record #: [id number]  Today's Date: 2/7/2023     Precautions  Precautions: (P) Fall Risk, Weight Bearing As Tolerated Left Lower Extremity  Comments: (P) POTS, Annetta-Danlos syndrome, schizophrenia    Safety   ADL Safety : Requires Supervision for Safety  Bathroom Safety: Requires Supervision for Safety    Subjective    Pt requested to work on kitchen mobility.      Objective       02/07/23 0931   OT Charge Group   OT Therapy Activity (Units) 1   OT Therapeutic Exercise (Units) 1   OT Total Time Spent   OT Individual Total Time Spent (Mins) 30   Precautions   Precautions Fall Risk;Weight Bearing As Tolerated Left Lower Extremity   Comments POTS, Annetta-Danlos syndrome, schizophrenia   IADL Treatments   Kitchen Mobility Education Pt completed kitchen mobility w/ supervision and no AD. Pt collected items from cabinets, drawers, and appliance. Pt held onto counter while ambulating and reaching to grab items.   Sitting Lower Body Exercises   Nustep Resistance Level 5  (10 min, 0 RB)   Interdisciplinary Plan of Care Collaboration   IDT Collaboration with  Nursing   Patient Position at End of Therapy Seated;Tray Table within Reach;Call Light within Reach;Phone within Reach   Collaboration Comments medications         Assessment    Pt completed kitchen mobility with increased balance w/o AD. Pt reports she feels confident in d/c home this week after completing kitchen mobility. Pt also participated in NuStep exercise to increase endurance and overall strength.   Strengths: Able to follow instructions, Adequate strength, Alert and oriented, Effective communication skills, Good insight into deficits/needs, Independent prior level of function, Motivated for self care and independence, Pleasant and cooperative, Willingly participates in therapeutic activities  Barriers: Decreased endurance, Fatigue, Impaired activity  tolerance, Impaired balance, Pain, Orthostatic hypotension    Plan    ADLs, IADLs, functional mobility, UE strength, core strength.     Occupational Therapy Goals (Active)       Problem: Bathing       Dates: Start: 02/01/23         Goal: STG-Within one week, patient will bathe with supervision and AE as needed.       Dates: Start: 02/01/23         Goal Note filed on 02/02/23 1137 by Sesar Leach, OT/L       Min A                 Problem: Dressing       Dates: Start: 02/01/23         Goal: STG-Within one week, patient will retrieve clothing with supervision and AE as needed.       Dates: Start: 02/01/23         Goal Note filed on 02/02/23 1137 by Sesar Leach OT/L       SBA/CGA with FWW                 Problem: Functional Transfers       Dates: Start: 02/01/23         Goal: STG-Within one week, patient will transfer to toilet with SBA.       Dates: Start: 02/01/23         Goal Note filed on 02/02/23 1137 by Sesar Leach, OT/L       CGA                  Problem: OT Long Term Goals       Dates: Start: 02/01/23         Goal: LTG-By discharge, patient will complete basic self care tasks with supervision.       Dates: Start: 02/01/23            Goal: LTG-By discharge, patient will perform bathroom transfers with supervision.       Dates: Start: 02/01/23            Goal: LTG-By discharge, patient will complete basic home management with supervision.       Dates: Start: 02/01/23

## 2023-02-08 ENCOUNTER — APPOINTMENT (OUTPATIENT)
Dept: RADIOLOGY | Facility: REHABILITATION | Age: 33
DRG: 560 | End: 2023-02-08
Attending: PHYSICAL MEDICINE & REHABILITATION
Payer: MEDICARE

## 2023-02-08 LAB
ANION GAP SERPL CALC-SCNC: 10 MMOL/L (ref 7–16)
BASOPHILS # BLD AUTO: 0.6 % (ref 0–1.8)
BASOPHILS # BLD: 0.03 K/UL (ref 0–0.12)
BUN SERPL-MCNC: 8 MG/DL (ref 8–22)
CALCIUM SERPL-MCNC: 8.9 MG/DL (ref 8.5–10.5)
CHLORIDE SERPL-SCNC: 106 MMOL/L (ref 96–112)
CO2 SERPL-SCNC: 21 MMOL/L (ref 20–33)
CREAT SERPL-MCNC: 0.62 MG/DL (ref 0.5–1.4)
EOSINOPHIL # BLD AUTO: 0.24 K/UL (ref 0–0.51)
EOSINOPHIL NFR BLD: 4.9 % (ref 0–6.9)
ERYTHROCYTE [DISTWIDTH] IN BLOOD BY AUTOMATED COUNT: 40.7 FL (ref 35.9–50)
GFR SERPLBLD CREATININE-BSD FMLA CKD-EPI: 120 ML/MIN/1.73 M 2
GLUCOSE SERPL-MCNC: 121 MG/DL (ref 65–99)
HCT VFR BLD AUTO: 37.2 % (ref 37–47)
HGB BLD-MCNC: 12.7 G/DL (ref 12–16)
IMM GRANULOCYTES # BLD AUTO: 0.03 K/UL (ref 0–0.11)
IMM GRANULOCYTES NFR BLD AUTO: 0.6 % (ref 0–0.9)
LYMPHOCYTES # BLD AUTO: 1.99 K/UL (ref 1–4.8)
LYMPHOCYTES NFR BLD: 40.6 % (ref 22–41)
MAGNESIUM SERPL-MCNC: 1.7 MG/DL (ref 1.5–2.5)
MCH RBC QN AUTO: 30.5 PG (ref 27–33)
MCHC RBC AUTO-ENTMCNC: 34.1 G/DL (ref 33.6–35)
MCV RBC AUTO: 89.2 FL (ref 81.4–97.8)
MONOCYTES # BLD AUTO: 0.4 K/UL (ref 0–0.85)
MONOCYTES NFR BLD AUTO: 8.2 % (ref 0–13.4)
NEUTROPHILS # BLD AUTO: 2.21 K/UL (ref 2–7.15)
NEUTROPHILS NFR BLD: 45.1 % (ref 44–72)
NRBC # BLD AUTO: 0 K/UL
NRBC BLD-RTO: 0 /100 WBC
PLATELET # BLD AUTO: 175 K/UL (ref 164–446)
PMV BLD AUTO: 11.8 FL (ref 9–12.9)
POTASSIUM SERPL-SCNC: 3.8 MMOL/L (ref 3.6–5.5)
RBC # BLD AUTO: 4.17 M/UL (ref 4.2–5.4)
SODIUM SERPL-SCNC: 137 MMOL/L (ref 135–145)
WBC # BLD AUTO: 4.9 K/UL (ref 4.8–10.8)

## 2023-02-08 PROCEDURE — 97110 THERAPEUTIC EXERCISES: CPT

## 2023-02-08 PROCEDURE — 83735 ASSAY OF MAGNESIUM: CPT

## 2023-02-08 PROCEDURE — 99232 SBSQ HOSP IP/OBS MODERATE 35: CPT | Performed by: PHYSICAL MEDICINE & REHABILITATION

## 2023-02-08 PROCEDURE — 85025 COMPLETE CBC W/AUTO DIFF WBC: CPT

## 2023-02-08 PROCEDURE — 700111 HCHG RX REV CODE 636 W/ 250 OVERRIDE (IP): Performed by: PHYSICAL MEDICINE & REHABILITATION

## 2023-02-08 PROCEDURE — 36415 COLL VENOUS BLD VENIPUNCTURE: CPT

## 2023-02-08 PROCEDURE — 97116 GAIT TRAINING THERAPY: CPT

## 2023-02-08 PROCEDURE — 97530 THERAPEUTIC ACTIVITIES: CPT

## 2023-02-08 PROCEDURE — 94760 N-INVAS EAR/PLS OXIMETRY 1: CPT

## 2023-02-08 PROCEDURE — 97535 SELF CARE MNGMENT TRAINING: CPT

## 2023-02-08 PROCEDURE — 73501 X-RAY EXAM HIP UNI 1 VIEW: CPT | Mod: LT

## 2023-02-08 PROCEDURE — 700102 HCHG RX REV CODE 250 W/ 637 OVERRIDE(OP): Performed by: PHYSICAL MEDICINE & REHABILITATION

## 2023-02-08 PROCEDURE — 770010 HCHG ROOM/CARE - REHAB SEMI PRIVAT*

## 2023-02-08 PROCEDURE — A9270 NON-COVERED ITEM OR SERVICE: HCPCS | Performed by: PHYSICAL MEDICINE & REHABILITATION

## 2023-02-08 PROCEDURE — 80048 BASIC METABOLIC PNL TOTAL CA: CPT

## 2023-02-08 RX ADMIN — OXYCODONE HYDROCHLORIDE 10 MG: 10 TABLET ORAL at 08:27

## 2023-02-08 RX ADMIN — METOPROLOL SUCCINATE 25 MG: 25 TABLET, EXTENDED RELEASE ORAL at 08:27

## 2023-02-08 RX ADMIN — OXYCODONE HYDROCHLORIDE 10 MG: 10 TABLET ORAL at 20:15

## 2023-02-08 RX ADMIN — OXYCODONE HYDROCHLORIDE 10 MG: 10 TABLET ORAL at 15:17

## 2023-02-08 RX ADMIN — GABAPENTIN 300 MG: 300 CAPSULE ORAL at 20:06

## 2023-02-08 RX ADMIN — GABAPENTIN 300 MG: 300 CAPSULE ORAL at 15:57

## 2023-02-08 RX ADMIN — Medication 2000 UNITS: at 08:24

## 2023-02-08 RX ADMIN — ZIPRASIDONE HYDROCHLORIDE 40 MG: 40 CAPSULE ORAL at 20:06

## 2023-02-08 RX ADMIN — METHOCARBAMOL 500 MG: 500 TABLET ORAL at 15:57

## 2023-02-08 RX ADMIN — METHOCARBAMOL 500 MG: 500 TABLET ORAL at 20:06

## 2023-02-08 RX ADMIN — METHOCARBAMOL 500 MG: 500 TABLET ORAL at 08:26

## 2023-02-08 RX ADMIN — GABAPENTIN 300 MG: 300 CAPSULE ORAL at 08:23

## 2023-02-08 RX ADMIN — DIPHENHYDRAMINE HCL 50 MG: 25 TABLET ORAL at 20:15

## 2023-02-08 RX ADMIN — Medication 10.5 MG: at 20:05

## 2023-02-08 RX ADMIN — SENNOSIDES AND DOCUSATE SODIUM 2 TABLET: 50; 8.6 TABLET ORAL at 08:24

## 2023-02-08 RX ADMIN — OMEPRAZOLE 20 MG: 20 CAPSULE, DELAYED RELEASE ORAL at 08:23

## 2023-02-08 RX ADMIN — HYDROMORPHONE HYDROCHLORIDE 2 MG: 2 TABLET ORAL at 11:30

## 2023-02-08 RX ADMIN — ENOXAPARIN SODIUM 40 MG: 40 INJECTION SUBCUTANEOUS at 17:38

## 2023-02-08 RX ADMIN — TRAZODONE HYDROCHLORIDE 100 MG: 100 TABLET ORAL at 20:05

## 2023-02-08 RX ADMIN — SENNOSIDES AND DOCUSATE SODIUM 2 TABLET: 50; 8.6 TABLET ORAL at 20:06

## 2023-02-08 ASSESSMENT — ACTIVITIES OF DAILY LIVING (ADL)
BED_CHAIR_WHEELCHAIR_TRANSFER_DESCRIPTION: ADAPTIVE EQUIPMENT
TOILET_TRANSFER_DESCRIPTION: ADAPTIVE EQUIPMENT;GRAB BAR
TUB_SHOWER_TRANSFER_DESCRIPTION: GRAB BAR;SHOWER BENCH;SUPERVISION FOR SAFETY
TOILET_TRANSFER_DESCRIPTION: ADAPTIVE EQUIPMENT
BED_CHAIR_WHEELCHAIR_TRANSFER_DESCRIPTION: ADAPTIVE EQUIPMENT

## 2023-02-08 ASSESSMENT — PAIN DESCRIPTION - PAIN TYPE
TYPE: ACUTE PAIN

## 2023-02-08 ASSESSMENT — GAIT ASSESSMENTS
GAIT LEVEL OF ASSIST: MODIFIED INDEPENDENT
DISTANCE (FEET): 300
ASSISTIVE DEVICE: 4 WHEEL WALKER
ASSISTIVE DEVICE: 4 WHEEL WALKER
DEVIATION: BRADYKINETIC;DECREASED HEEL STRIKE;DECREASED TOE OFF
GAIT LEVEL OF ASSIST: STANDBY ASSIST
DISTANCE (FEET): 150

## 2023-02-08 NOTE — THERAPY
"Occupational Therapy  Daily Treatment     Patient Name: Kristin Balderrama  Age:  32 y.o., Sex:  female  Medical Record #: [id number]  Today's Date: 2/8/2023     Precautions  Precautions: (P) Fall Risk, Weight Bearing As Tolerated Left Lower Extremity  Comments: (P) POTS, Annetta-Danlos syndrome, schizophrenia    Safety   ADL Safety : Requires Supervision for Safety  Bathroom Safety: Requires Supervision for Safety    Subjective    \" I need to practice in the kitchen  getting things from High and low places.\"     Objective/  Assessment       02/08/23 0932   OT Charge Group   OT Therapy Activity (Units) 2   OT Total Time Spent   OT Individual Total Time Spent (Mins) 30   Precautions   Precautions Fall Risk;Weight Bearing As Tolerated Left Lower Extremity   Comments POTS, Annetta-Danlos syndrome, schizophrenia   IADL Treatments   Kitchen Mobility Education supervision   using 4WW as needed item retrieval from various High/ low cabinets   bins in refridgerator    worked on  how to get items in / out of the oven (  a dobule oven in the wall)     and in/ out of .  Educated regarding  safety for one hand on counter top  frame of fridge for solid surface support     use of  \" golfers  \"  ( kicking affected leg back when reaching  down low )  when experiencing pain  but also to continue to bend at the hips for improved mobility     Kristin verbalized understanding of all instruction  with  good carry over requiring only occasional cues for technique   Interdisciplinary Plan of Care Collaboration   IDT Collaboration with  Physical Therapist   Patient Position at End of Therapy Seated  (in gym  hand off to PT for tx)   Collaboration Comments Patients current functional status     4WW room to gym   one quick bout of  hip pain  reporting it as 10 of 10 lasting less than 30 seconds during mobility reports she is worried her hip is \" damaged.\"     She did report  doing more exercise and practicing floor recover " yesterday         Strengths: Able to follow instructions, Adequate strength, Alert and oriented, Effective communication skills, Good insight into deficits/needs, Independent prior level of function, Motivated for self care and independence, Pleasant and cooperative, Willingly participates in therapeutic activities  Barriers: Decreased endurance, Fatigue, Impaired activity tolerance, Impaired balance, Pain, Orthostatic hypotension    Plan      ADLs, IADLs, functional mobility, UE strength, core strength.      Occupational Therapy Goals (Active)       Problem: Bathing       Dates: Start: 02/01/23         Goal: STG-Within one week, patient will bathe with supervision and AE as needed.       Dates: Start: 02/01/23         Goal Note filed on 02/02/23 1137 by Sesar Leach OT/GIULIANO       Min A                 Problem: Dressing       Dates: Start: 02/01/23         Goal: STG-Within one week, patient will retrieve clothing with supervision and AE as needed.       Dates: Start: 02/01/23         Goal Note filed on 02/02/23 1137 by Sesar Leach OT/L       SBA/CGA with FWW                 Problem: Functional Transfers       Dates: Start: 02/01/23         Goal: STG-Within one week, patient will transfer to toilet with SBA.       Dates: Start: 02/01/23         Goal Note filed on 02/02/23 1137 by Sesar Leach OT/L       CGA                  Problem: OT Long Term Goals       Dates: Start: 02/01/23         Goal: LTG-By discharge, patient will complete basic self care tasks with supervision.       Dates: Start: 02/01/23            Goal: LTG-By discharge, patient will perform bathroom transfers with supervision.       Dates: Start: 02/01/23            Goal: LTG-By discharge, patient will complete basic home management with supervision.       Dates: Start: 02/01/23

## 2023-02-08 NOTE — PROGRESS NOTES
Received bedside shift report from Joe PURCELL RN regarding patient and assumed care. Patient awake, calm and stable, currently positioned in bed for comfort and safety; call light within reach. Denies pain or discomfort at this time. Will continue to monitor.

## 2023-02-08 NOTE — THERAPY
Physical Therapy   Daily Treatment     Patient Name: Kristin Balderrama  Age:  32 y.o., Sex:  female  Medical Record #: [id number]  Today's Date: 2/8/2023     Precautions  Precautions: Fall Risk, Weight Bearing As Tolerated Left Lower Extremity  Comments: POTS, Annetta-Danlos syndrome, schizophrenia    Subjective    Pt received washing hands at sink in bathroom, agreeable to participate. Reported that she had not finished her bfast since trays had arrived late and would finish eating after this session. Reported that she plans to use 4WW for ambulation at home d/t variability of hip pain.    Presented with much more subdued affect today than usual but reported that she slept well.     Objective       02/08/23 0831   PT Charge Group   PT Therapeutic Exercise (Units) 2   PT Total Time Spent   PT Individual Total Time Spent (Mins) 30   Gait Functional Level of Assist    Gait Level Of Assist Modified Independent   Assistive Device 4 Wheel Walker   Distance (Feet) 150   # of Times Distance was Traveled 2   Deviation Bradykinetic;Decreased Heel Strike;Decreased Toe Off   Transfer Functional Level of Assist   Bed, Chair, Wheelchair Transfer Modified Independent   Bed Chair Wheelchair Transfer Description Adaptive equipment  (stand step 4WW)   Toilet Transfers Modified Independent   Toilet Transfer Description Adaptive equipment  (4WW<>toilet)   Supine Lower Body Exercise   Knee to Chest 3 sets of 10;Bilateral  (alternating LE marches with VC for core stability)   Other Exercises BUE shoulder flexion from chest<>OH holding theraball 3x10 for core strengthening   Sitting Lower Body Exercises   Nustep Resistance Level 1  (x8 min with 0 RB and LEs only)   Bed Mobility    Supine to Sit Modified Independent   Sit to Supine Modified Independent   Sit to Stand Modified Independent   Scooting Modified Independent   Rolling Modified Independent   Interdisciplinary Plan of Care Collaboration   Patient Position at End of Therapy In  "Bed;Call Light within Reach;Tray Table within Reach;Phone within Reach         Assessment    Pt appeared more lethargic/quiet today than usual but still participated to the best of her abilities. Demonstrated significantly improved pelvic stability when walking with dec lateral trunk sway and dec 4WW \"weaving.\"     Strengths: Able to follow instructions, Adequate strength, Alert and oriented, Effective communication skills, Good carryover of learning, Good insight into deficits/needs, Independent prior level of function, Manages pain appropriately, Motivated for self care and independence, Pleasant and cooperative, Supportive family, Willingly participates in therapeutic activities  Barriers: Decreased endurance, Fatigue, Impaired activity tolerance, Impaired balance, Limited mobility, Pain (hx of POTS with frequent falls at home)    Plan    Gait with 4WW vs no AD, standing balance no UE support, stairs/curb training, energy conservation/endurance, LE/core strengthening, monitor dizziness with position changes d/t hx of POTS     Passport items to be completed:  Get in/out of bed safely, in/out of a vehicle, safely use mobility device, walk or wheel around home/community, navigate up and down stairs, show how to get up/down from the ground, ensure home is accessible, demonstrate HEP, complete caregiver training    Physical Therapy Problems (Active)       Problem: Mobility       Dates: Start: 02/01/23         Goal: STG-Within one week, patient will ambulate household distance 50 ft x2 with FWW and SBA.       Dates: Start: 02/01/23         Goal Note filed on 02/02/23 1349 by Luz Maria Acuña, PT       Up to 85 ft x3 with CGA              Goal: STG-Within one week, patient will ambulate up/down a curb with FWW and CGA.       Dates: Start: 02/01/23         Goal Note filed on 02/02/23 1349 by Luz Maria Acuña, PT       Initiated curb step training today              Goal: STG-Within one week, patient will ascend and descend four " to six stairs with min A and BHR as needed.       Dates: Start: 02/01/23         Goal Note filed on 02/02/23 1349 by Luz Maria Acuña PT       Not yet initiated                 Problem: Mobility Transfers       Dates: Start: 02/01/23         Goal: STG-Within one week, patient will transfer bed to chair with FWW and SBA.       Dates: Start: 02/01/23         Goal Note filed on 02/02/23 1349 by Luz Maria Acuña PT       CGA                 Problem: PT-Long Term Goals       Dates: Start: 02/01/23         Goal: LTG-By discharge, patient will ambulate 150 ft x2 with FWW vs LRAD and SBA.       Dates: Start: 02/01/23            Goal: LTG-By discharge, patient will transfer one surface to another with FWW vs LRAD and SPV.       Dates: Start: 02/01/23            Goal: LTG-By discharge, patient will ambulate up/down flight of stairs with SBA and RHR.       Dates: Start: 02/01/23            Goal: LTG-By discharge, patient will transfer in/out of a car with FWW vs LRAD and set up assist.       Dates: Start: 02/01/23

## 2023-02-08 NOTE — FLOWSHEET NOTE
02/07/23 1644   Events/Summary/Plan   Events/Summary/Plan SpO2 check, Pt has not needed O2 for the last couple of days.   Vital Signs   Pulse 68   Respiration 16   Pulse Oximetry 96 %   $ Pulse Oximetry (Spot Check) Yes   Respiratory Assessment   Respiratory Pattern Within Normal Limits   Level of Consciousness Alert   Chest Exam   Work Of Breathing / Effort Within Normal Limits   Oxygen   O2 Delivery Device None - Room Air

## 2023-02-08 NOTE — CARE PLAN
Problem: Knowledge Deficit - Standard  Goal: Patient and family/care givers will demonstrate understanding of plan of care, disease process/condition, diagnostic tests and medications  Note: Pt agrees with plan of care tonight regarding medications and safety.  Will continue to monitor patient.      Problem: Pain - Standard  Goal: Alleviation of pain or a reduction in pain to the patient’s comfort goal  Outcome: Progressing  Note: C/o pain to left hip, medicated with scheduled Gabapentin and Robaxin, as well as prn Oxycodone.  Has some relief.  See MAR and doc flow sheet.  Will continue to monitor patient.     The patient is Stable - Low risk of patient condition declining or worsening    Shift Goals  Clinical Goals: Safety  Patient Goals: Sleep well    Progress made toward(s) clinical / shift goals:  progressing

## 2023-02-08 NOTE — THERAPY
Occupational Therapy  Daily Treatment     Patient Name: Kristin Balderrama  Age:  32 y.o., Sex:  female  Medical Record #: [id number]  Today's Date: 2/8/2023     Precautions  Precautions: Fall Risk, Weight Bearing As Tolerated Left Lower Extremity  Comments: POTS, Annetta-Danlos syndrome, schizophrenia    Safety   ADL Safety : Requires Supervision for Safety  Bathroom Safety: Requires Supervision for Safety    Subjective    Patient was in bed upon arrival. Patient agreed to OT session and to shower. She mentioned that she had increased L hip pain due to therapy from the previous day.     Objective       02/08/23 1231   OT Charge Group   OT Self Care / ADL (Units) 2   OT Therapy Activity (Units) 1   OT Therapeutic Exercise (Units) 1   OT Total Time Spent   OT Individual Total Time Spent (Mins) 60   Cosignature   Documentation Review Approved with changes as documented and edited in this column.    Precautions   Precautions Fall Risk;Weight Bearing As Tolerated Left Lower Extremity   Pain   Intervention   (patient did not want meds or ice for pain)   Pain 0 - 10 Group   Location Hip   Location Orientation Left   Therapist Pain Assessment Prior to Activity;During Activity;Post Activity   Non Verbal Descriptors   Non Verbal Scale  Grimacing;Tense Body Language   Functional Level of Assist   Bathing Supervision   Bathing Description Grab bar;Hand held shower;Tub bench;Supervision for safety   Upper Body Dressing Supervision   Upper Body Dressing Description Supervision for safety;Assist device equipment  (4WW)   Lower Body Dressing Minimal Assist  (min A threading L LE due to pain)   Lower Body Dressing Description Dressing stick;Sock aid;Assistive devices;Supervision for safety  (4WW)   Toileting Supervision   Bed, Chair, Wheelchair Transfer Modified Independent  (4WW)   Bed Chair Wheelchair Transfer Description Adaptive equipment   Toilet Transfers Modified Independent   Toilet Transfer Description Adaptive  equipment;Grab bar  (4WW)   Tub / Shower Transfers Supervised   Tub Shower Transfer Description Grab bar;Shower bench;Supervision for safety   Sitting Upper Body Exercises   Sitting Upper Body Exercises Yes   Tricep Press 3 sets of 10;Weight (See Comments for lbs)  (3 x 10 on rickshaw with 25 lbs)   Standing Upper Body Exercises   Standing Upper Body Exercises Yes   Other Exercises   (lat pulls on equalizer, 3 x 10  with 25 lbs and SBA)   IADL Treatments   Home Management patient stood to load, add soap, and start washing machine with supervision.   Interdisciplinary Plan of Care Collaboration   Patient Position at End of Therapy In Bed;Tray Table within Reach;Call Light within Reach;Phone within Reach     Functional mobility with 4WW and supervision to retrieve clothes from closet, to walk to bathroom for shower, and room<>gym.    Assessment    Patient had increased L hip pain causing difficulty with donning LB clothing. Patient tolerated standing activities and UE exercises well throughout session.     Strengths: Able to follow instructions, Adequate strength, Alert and oriented, Effective communication skills, Good insight into deficits/needs, Independent prior level of function, Motivated for self care and independence, Pleasant and cooperative, Willingly participates in therapeutic activities  Barriers: Decreased endurance, Fatigue, Impaired activity tolerance, Impaired balance, Pain, Orthostatic hypotension    Plan    D/c [facility]. D/c on Friday. UE exercises, core exercises, ADLs, IADLs, functional mobility.     Occupational Therapy Goals (Active)       Problem: Bathing       Dates: Start: 02/01/23         Goal: STG-Within one week, patient will bathe with supervision and AE as needed.       Dates: Start: 02/01/23         Goal Note filed on 02/02/23 1137 by Sesar Leach OT/GIULIANO LIRIANO                 Problem: Dressing       Dates: Start: 02/01/23         Goal: STG-Within one week, patient will  retrieve clothing with supervision and AE as needed.       Dates: Start: 02/01/23         Goal Note filed on 02/02/23 1137 by Sesar Leach, OT/L       SBA/CGA with FWW                 Problem: Functional Transfers       Dates: Start: 02/01/23         Goal: STG-Within one week, patient will transfer to toilet with SBA.       Dates: Start: 02/01/23         Goal Note filed on 02/02/23 1137 by Sesar Leach, OT/L       CGA                  Problem: OT Long Term Goals       Dates: Start: 02/01/23         Goal: LTG-By discharge, patient will complete basic self care tasks with supervision.       Dates: Start: 02/01/23            Goal: LTG-By discharge, patient will perform bathroom transfers with supervision.       Dates: Start: 02/01/23            Goal: LTG-By discharge, patient will complete basic home management with supervision.       Dates: Start: 02/01/23

## 2023-02-08 NOTE — PROGRESS NOTES
"  Physical Medicine & Rehabilitation Progress Note    Encounter Date: 2/8/2023    Chief Complaint: Decreased mobility    Interval Events (Subjective):  Patient sitting up in therapy gym. She reports worsening pain to her left hip which is 10/10. She thinks she twisted it and is having stinging pain.  She denies hearing any sounds or trauma to hip. Discussed would check stat XR to make sure hardware is in place. She denies feeling of dislocation. She continues to use pain medications.     _____________________________________  Interdisciplinary Team Conference   Most recent IDT on 2/2/2023     Discharge Date/Disposition:  2/10/23  _____________________________________    Objective:  VITAL SIGNS: /62   Pulse 68   Temp 36.2 °C (97.2 °F) (Oral)   Resp 20   Ht 1.626 m (5' 4\")   Wt 107 kg (235 lb 14.3 oz)   SpO2 96%   BMI 40.49 kg/m²   Gen: NAD  Psych: Mood and affect appropriate  CV: RRR, 1+ LLE edema  Resp: CTAB, no upper airway sounds  Abd: NTND  Neuro: AOx4, following commands  MSK: normal ROM L hip    Laboratory Values:  Recent Results (from the past 72 hour(s))   Group A Strep by PCR    Collection Time: 02/06/23  1:30 PM    Specimen: Throat   Result Value Ref Range    Group A Strep by PCR Not Detected Not Detected   CBC WITH DIFFERENTIAL    Collection Time: 02/08/23  5:46 AM   Result Value Ref Range    WBC 4.9 4.8 - 10.8 K/uL    RBC 4.17 (L) 4.20 - 5.40 M/uL    Hemoglobin 12.7 12.0 - 16.0 g/dL    Hematocrit 37.2 37.0 - 47.0 %    MCV 89.2 81.4 - 97.8 fL    MCH 30.5 27.0 - 33.0 pg    MCHC 34.1 33.6 - 35.0 g/dL    RDW 40.7 35.9 - 50.0 fL    Platelet Count 175 164 - 446 K/uL    MPV 11.8 9.0 - 12.9 fL    Neutrophils-Polys 45.10 44.00 - 72.00 %    Lymphocytes 40.60 22.00 - 41.00 %    Monocytes 8.20 0.00 - 13.40 %    Eosinophils 4.90 0.00 - 6.90 %    Basophils 0.60 0.00 - 1.80 %    Immature Granulocytes 0.60 0.00 - 0.90 %    Nucleated RBC 0.00 /100 WBC    Neutrophils (Absolute) 2.21 2.00 - 7.15 K/uL    Lymphs " (Absolute) 1.99 1.00 - 4.80 K/uL    Monos (Absolute) 0.40 0.00 - 0.85 K/uL    Eos (Absolute) 0.24 0.00 - 0.51 K/uL    Baso (Absolute) 0.03 0.00 - 0.12 K/uL    Immature Granulocytes (abs) 0.03 0.00 - 0.11 K/uL    NRBC (Absolute) 0.00 K/uL   Basic Metabolic Panel    Collection Time: 02/08/23  5:46 AM   Result Value Ref Range    Sodium 137 135 - 145 mmol/L    Potassium 3.8 3.6 - 5.5 mmol/L    Chloride 106 96 - 112 mmol/L    Co2 21 20 - 33 mmol/L    Glucose 121 (H) 65 - 99 mg/dL    Bun 8 8 - 22 mg/dL    Creatinine 0.62 0.50 - 1.40 mg/dL    Calcium 8.9 8.5 - 10.5 mg/dL    Anion Gap 10.0 7.0 - 16.0   MAGNESIUM    Collection Time: 02/08/23  5:46 AM   Result Value Ref Range    Magnesium 1.7 1.5 - 2.5 mg/dL   ESTIMATED GFR    Collection Time: 02/08/23  5:46 AM   Result Value Ref Range    GFR (CKD-EPI) 120 >60 mL/min/1.73 m 2       Medications:  Scheduled Medications   Medication Dose Frequency    gabapentin  300 mg TID    vitamin D3  2,000 Units DAILY    senna-docusate  2 Tablet BID    omeprazole  20 mg DAILY    enoxaparin (LOVENOX) injection  40 mg DAILY AT 1800    ivabradine  7.5 mg BID WITH MEALS    melatonin  10.5 mg QHS    methocarbamol  500 mg TID    metoprolol SR  25 mg QAM    traZODone  100 mg QHS    ziprasidone  40 mg QHS     PRN medications: sore throat spray, guaiFENesin ER, benzocaine-menthol, diphenhydrAMINE, Respiratory Therapy Consult, hydrALAZINE, acetaminophen, senna-docusate **AND** polyethylene glycol/lytes **AND** magnesium hydroxide **AND** bisacodyl, mag hydrox-al hydrox-simeth, ondansetron **OR** ondansetron, traZODone, sodium chloride, traMADol, acetaminophen, albuterol, ipratropium-albuterol, [DISCONTINUED] oxyCODONE immediate-release **OR** oxyCODONE immediate-release, HYDROmorphone    Diet:  Current Diet Order   Procedures    Diet Order Diet: Regular       Assessment:  Active Hospital Problems    Diagnosis     *Hip fracture, left, closed, initial encounter ([facility])     S/p left hip fracture      Moderate asthma     IBS (irritable bowel syndrome)     Schizoaffective disorder, depressive type (HCC)     TONYA (obstructive sleep apnea)     Morbidly obese (HCC)        Medical Decision Making and Plan:  Fall with left hip fracture - Patient s/p fall with hip fracture s/p IMN with Dr. Abdul on 1/28/23. Patient WBAT.  -PT and OT for mobility and ADLs. Per guidelines, 15 hours per week between PT, OT and SLP.  -Follow-up Dr. Abdul  -Worsened leg pain on 2/8/23 with feeling of 10/10 with instability. Check XR hip, high risk for injury as well as dislocation. If ongoing pain consider US to rule out hematoma, will check AM labs     Hyperglycemia - Not on any medication on transfer. Will check A1c - 5.1. No longer diabetic     Asthma - Patient on PRN Duoneb and Albuterol inhaler      POTS - Patient on Metoprolol 25 mg XL and Corlanor 7.5 mg BID     New chest pain - Rapid response on 2/3/23 for chest pain. EKG to bedside with sinus rhythm. Stat Troponin, CXR, CBC, CMP. Patient's case was discussed face to face with Hospitalist on [facility] floor. Will repeat Troponin in 6 hours. Patient at high risk for further injury due to history of POTS, obesity and antipsychotic usage which can affected QTc.   -Negative on work-up. Resolved on 2/3/23 repeat troponin negative.    Neuropathy - Patient on Gabapentin 100 mg TID -> 300 mg TID. High risk medication will need to monitor Cr. Will recheck      Thrombocytopenia - Check AM CBC - 154, stable. Continue to monitor     Morbid obesity due to excess calories - BMI of 39.7 on admission, meets medical criteria. Dietitian to consult     Schizophrenia - Patient on Geodon 40 mg QHS and Trazodone      Pain - APAP/Gabapentin/Oxycodone and Robaxin TID  -Change PRN to Oxycodone 10 mg for moderate and Dilaudid PO 2 mg q3h. As having severe pain and has constipation with oxycodone. Dilaudid is high risk medication discussed about previous confusion/agitation and will need to monitor in setting of  psych history.      Sore throat - history of strep throat. Will check Strep PCR. Low threshold for check COVID. No other symptoms.   -Strep negative. Start chloraseptic spray and mucinex. Recheck labs    Skin - Patient at risk for skin breakdown due to debility in areas including sacrum, achilles, elbows and head in addition to other sites. Nursing to assess skin daily.      Vitamin D deficiency - acute on chronic. Start 2000 U     GI Ppx - Patient on Prilosec for GERD prophylaxis. Patient on Senna-docusate for constipation prophylaxis.      DVT Ppx - Patient Lovenox on transfer.  -Worsening left leg pain. Patient with decreased sensation and swelling to lower LE. Discussed stat DVT doppler. Did have SOB. Will check CXR. RT to give albuterol, if ongoing will get CTA PE. Patient at very high risk for DVT/PE due to fracture and obesity.   ____________________________________    T. Dhruv Simmons MD./PhD.  [facility] - Physical Medicine & Rehabilitation   [facility] - Brain Injury Medicine   ____________________________________

## 2023-02-08 NOTE — CARE PLAN
Problem: Knowledge Deficit - Standard  Goal: Patient and family/care givers will demonstrate understanding of plan of care, disease process/condition, diagnostic tests and medications  Outcome: Progressing   Pt education given regarding plan of care, pt shows some understanding, will continue to reinforce education and continue to monitor.       Problem: Fall Risk - Rehab  Goal: Patient will remain free from falls  Outcome: Progressing   Pt education given regarding fall precautions AND safety measures, pt shows good understanding, pt is Mod I and can self transfer this shift, will continue to reinforce education and continue to monitor.

## 2023-02-09 ENCOUNTER — TELEMEDICINE (OUTPATIENT)
Dept: CARDIOLOGY | Facility: MEDICAL CENTER | Age: 33
End: 2023-02-09
Payer: MEDICARE

## 2023-02-09 VITALS
SYSTOLIC BLOOD PRESSURE: 96 MMHG | HEART RATE: 66 BPM | DIASTOLIC BLOOD PRESSURE: 59 MMHG | BODY MASS INDEX: 40.49 KG/M2 | OXYGEN SATURATION: 95 % | HEIGHT: 64 IN

## 2023-02-09 DIAGNOSIS — M35.7 HYPERMOBILITY SYNDROME: ICD-10-CM

## 2023-02-09 DIAGNOSIS — R00.2 PALPITATIONS: ICD-10-CM

## 2023-02-09 DIAGNOSIS — I47.11 INAPPROPRIATE SINUS TACHYCARDIA (HCC): ICD-10-CM

## 2023-02-09 PROBLEM — R07.9 CHEST PAIN: Status: RESOLVED | Noted: 2022-05-13 | Resolved: 2023-02-09

## 2023-02-09 LAB
ANION GAP SERPL CALC-SCNC: 11 MMOL/L (ref 7–16)
BASOPHILS # BLD AUTO: 0.6 % (ref 0–1.8)
BASOPHILS # BLD: 0.03 K/UL (ref 0–0.12)
BUN SERPL-MCNC: 7 MG/DL (ref 8–22)
CALCIUM SERPL-MCNC: 8.9 MG/DL (ref 8.5–10.5)
CHLORIDE SERPL-SCNC: 108 MMOL/L (ref 96–112)
CO2 SERPL-SCNC: 19 MMOL/L (ref 20–33)
CREAT SERPL-MCNC: 0.65 MG/DL (ref 0.5–1.4)
EOSINOPHIL # BLD AUTO: 0.22 K/UL (ref 0–0.51)
EOSINOPHIL NFR BLD: 4.8 % (ref 0–6.9)
ERYTHROCYTE [DISTWIDTH] IN BLOOD BY AUTOMATED COUNT: 39.8 FL (ref 35.9–50)
GFR SERPLBLD CREATININE-BSD FMLA CKD-EPI: 119 ML/MIN/1.73 M 2
GLUCOSE SERPL-MCNC: 138 MG/DL (ref 65–99)
HCT VFR BLD AUTO: 35.7 % (ref 37–47)
HGB BLD-MCNC: 12.4 G/DL (ref 12–16)
IMM GRANULOCYTES # BLD AUTO: 0.03 K/UL (ref 0–0.11)
IMM GRANULOCYTES NFR BLD AUTO: 0.6 % (ref 0–0.9)
LYMPHOCYTES # BLD AUTO: 1.82 K/UL (ref 1–4.8)
LYMPHOCYTES NFR BLD: 39.3 % (ref 22–41)
MCH RBC QN AUTO: 30.6 PG (ref 27–33)
MCHC RBC AUTO-ENTMCNC: 34.7 G/DL (ref 33.6–35)
MCV RBC AUTO: 88.1 FL (ref 81.4–97.8)
MONOCYTES # BLD AUTO: 0.38 K/UL (ref 0–0.85)
MONOCYTES NFR BLD AUTO: 8.2 % (ref 0–13.4)
NEUTROPHILS # BLD AUTO: 2.15 K/UL (ref 2–7.15)
NEUTROPHILS NFR BLD: 46.5 % (ref 44–72)
NRBC # BLD AUTO: 0 K/UL
NRBC BLD-RTO: 0 /100 WBC
PLATELET # BLD AUTO: 117 K/UL (ref 164–446)
PMV BLD AUTO: 12.1 FL (ref 9–12.9)
POTASSIUM SERPL-SCNC: 3.9 MMOL/L (ref 3.6–5.5)
RBC # BLD AUTO: 4.05 M/UL (ref 4.2–5.4)
SODIUM SERPL-SCNC: 138 MMOL/L (ref 135–145)
WBC # BLD AUTO: 4.6 K/UL (ref 4.8–10.8)

## 2023-02-09 PROCEDURE — 99214 OFFICE O/P EST MOD 30 MIN: CPT | Mod: 95 | Performed by: INTERNAL MEDICINE

## 2023-02-09 PROCEDURE — 97530 THERAPEUTIC ACTIVITIES: CPT

## 2023-02-09 PROCEDURE — 97110 THERAPEUTIC EXERCISES: CPT

## 2023-02-09 PROCEDURE — 770010 HCHG ROOM/CARE - REHAB SEMI PRIVAT*

## 2023-02-09 PROCEDURE — 700111 HCHG RX REV CODE 636 W/ 250 OVERRIDE (IP): Performed by: PHYSICAL MEDICINE & REHABILITATION

## 2023-02-09 PROCEDURE — 97116 GAIT TRAINING THERAPY: CPT

## 2023-02-09 PROCEDURE — 94760 N-INVAS EAR/PLS OXIMETRY 1: CPT

## 2023-02-09 PROCEDURE — A9270 NON-COVERED ITEM OR SERVICE: HCPCS | Performed by: PHYSICAL MEDICINE & REHABILITATION

## 2023-02-09 PROCEDURE — 700102 HCHG RX REV CODE 250 W/ 637 OVERRIDE(OP): Performed by: PHYSICAL MEDICINE & REHABILITATION

## 2023-02-09 PROCEDURE — 99233 SBSQ HOSP IP/OBS HIGH 50: CPT | Performed by: PHYSICAL MEDICINE & REHABILITATION

## 2023-02-09 PROCEDURE — 36415 COLL VENOUS BLD VENIPUNCTURE: CPT

## 2023-02-09 PROCEDURE — 80048 BASIC METABOLIC PNL TOTAL CA: CPT

## 2023-02-09 PROCEDURE — 97535 SELF CARE MNGMENT TRAINING: CPT

## 2023-02-09 PROCEDURE — 85025 COMPLETE CBC W/AUTO DIFF WBC: CPT

## 2023-02-09 RX ORDER — METHOCARBAMOL 500 MG/1
500 TABLET, FILM COATED ORAL 3 TIMES DAILY
Qty: 90 TABLET | Refills: 0 | Status: SHIPPED | OUTPATIENT
Start: 2023-02-09 | End: 2023-05-08

## 2023-02-09 RX ORDER — IVABRADINE 7.5 MG/1
7.5 TABLET, FILM COATED ORAL 2 TIMES DAILY WITH MEALS
Qty: 60 TABLET | Refills: 2 | Status: SHIPPED | OUTPATIENT
Start: 2023-02-09 | End: 2023-12-11 | Stop reason: SDUPTHER

## 2023-02-09 RX ORDER — METHOCARBAMOL 500 MG/1
500 TABLET, FILM COATED ORAL 3 TIMES DAILY
Qty: 90 TABLET | Refills: 0 | Status: SHIPPED | OUTPATIENT
Start: 2023-02-09 | End: 2023-02-09 | Stop reason: SDUPTHER

## 2023-02-09 RX ORDER — IVABRADINE 7.5 MG/1
7.5 TABLET, FILM COATED ORAL 2 TIMES DAILY WITH MEALS
Qty: 60 TABLET | Refills: 2 | Status: SHIPPED | OUTPATIENT
Start: 2023-02-09 | End: 2023-02-09 | Stop reason: SDUPTHER

## 2023-02-09 RX ORDER — GABAPENTIN 300 MG/1
300 CAPSULE ORAL 3 TIMES DAILY
Qty: 30 CAPSULE | Refills: 2 | Status: SHIPPED | OUTPATIENT
Start: 2023-02-09 | End: 2023-02-09 | Stop reason: SDUPTHER

## 2023-02-09 RX ORDER — GABAPENTIN 300 MG/1
300 CAPSULE ORAL 3 TIMES DAILY
Qty: 30 CAPSULE | Refills: 2 | Status: SHIPPED | OUTPATIENT
Start: 2023-02-09 | End: 2023-05-08

## 2023-02-09 RX ORDER — OXYCODONE HYDROCHLORIDE 10 MG/1
10 TABLET ORAL EVERY 6 HOURS PRN
Qty: 28 TABLET | Refills: 0 | Status: SHIPPED | OUTPATIENT
Start: 2023-02-09 | End: 2023-02-23

## 2023-02-09 RX ORDER — METOPROLOL SUCCINATE 25 MG/1
25 TABLET, EXTENDED RELEASE ORAL EVERY MORNING
Qty: 30 TABLET | Refills: 2 | Status: SHIPPED | OUTPATIENT
Start: 2023-02-09 | End: 2023-02-09 | Stop reason: SDUPTHER

## 2023-02-09 RX ORDER — SODIUM CHLORIDE 1 G/1
1 TABLET ORAL
Qty: 90 TABLET | Refills: 2 | Status: SHIPPED | OUTPATIENT
Start: 2023-02-09 | End: 2023-05-08 | Stop reason: SDUPTHER

## 2023-02-09 RX ORDER — OXYCODONE AND ACETAMINOPHEN TABLETS 10; 300 MG/1; MG/1
1 TABLET ORAL EVERY 4 HOURS PRN
Status: ON HOLD | COMMUNITY
End: 2023-02-09

## 2023-02-09 RX ORDER — TRAZODONE HYDROCHLORIDE 100 MG/1
100 TABLET ORAL
Qty: 30 TABLET | Refills: 2 | Status: SHIPPED | OUTPATIENT
Start: 2023-02-09 | End: 2023-02-09 | Stop reason: SDUPTHER

## 2023-02-09 RX ORDER — OXYCODONE HYDROCHLORIDE 10 MG/1
10 TABLET ORAL EVERY 6 HOURS PRN
Qty: 28 TABLET | Refills: 0 | Status: SHIPPED | OUTPATIENT
Start: 2023-02-09 | End: 2023-02-09 | Stop reason: SDUPTHER

## 2023-02-09 RX ORDER — ZIPRASIDONE HYDROCHLORIDE 40 MG/1
40 CAPSULE ORAL
Qty: 30 CAPSULE | Refills: 2 | Status: SHIPPED | OUTPATIENT
Start: 2023-02-09 | End: 2023-02-09 | Stop reason: SDUPTHER

## 2023-02-09 RX ORDER — HYDROMORPHONE HYDROCHLORIDE 2 MG/1
2 TABLET ORAL EVERY 12 HOURS PRN
Qty: 21 TABLET | Refills: 0 | Status: SHIPPED | OUTPATIENT
Start: 2023-02-09 | End: 2023-02-23

## 2023-02-09 RX ORDER — SODIUM CHLORIDE 1 G/1
1 TABLET ORAL
Qty: 90 TABLET | Refills: 2 | Status: SHIPPED | OUTPATIENT
Start: 2023-02-09 | End: 2023-02-09 | Stop reason: SDUPTHER

## 2023-02-09 RX ORDER — HYDROMORPHONE HYDROCHLORIDE 2 MG/1
2 TABLET ORAL EVERY 12 HOURS PRN
Qty: 21 TABLET | Refills: 0 | Status: SHIPPED | OUTPATIENT
Start: 2023-02-09 | End: 2023-02-09 | Stop reason: SDUPTHER

## 2023-02-09 RX ORDER — METOPROLOL SUCCINATE 25 MG/1
25 TABLET, EXTENDED RELEASE ORAL EVERY MORNING
Qty: 30 TABLET | Refills: 2 | Status: SHIPPED | OUTPATIENT
Start: 2023-02-09 | End: 2023-07-06

## 2023-02-09 RX ORDER — SODIUM CHLORIDE 1 G/1
1 TABLET ORAL
Status: DISCONTINUED | OUTPATIENT
Start: 2023-02-09 | End: 2023-02-10 | Stop reason: HOSPADM

## 2023-02-09 RX ORDER — TRAZODONE HYDROCHLORIDE 100 MG/1
100 TABLET ORAL
Qty: 30 TABLET | Refills: 2 | Status: SHIPPED | OUTPATIENT
Start: 2023-02-09 | End: 2023-05-08

## 2023-02-09 RX ORDER — ZIPRASIDONE HYDROCHLORIDE 40 MG/1
40 CAPSULE ORAL
Qty: 30 CAPSULE | Refills: 2 | Status: SHIPPED | OUTPATIENT
Start: 2023-02-09

## 2023-02-09 RX ADMIN — SODIUM CHLORIDE 1 G: 1 TABLET ORAL at 12:43

## 2023-02-09 RX ADMIN — GABAPENTIN 300 MG: 300 CAPSULE ORAL at 14:37

## 2023-02-09 RX ADMIN — METHOCARBAMOL 500 MG: 500 TABLET ORAL at 08:33

## 2023-02-09 RX ADMIN — METHOCARBAMOL 500 MG: 500 TABLET ORAL at 14:37

## 2023-02-09 RX ADMIN — HYDROMORPHONE HYDROCHLORIDE 2 MG: 2 TABLET ORAL at 11:29

## 2023-02-09 RX ADMIN — OMEPRAZOLE 20 MG: 20 CAPSULE, DELAYED RELEASE ORAL at 08:32

## 2023-02-09 RX ADMIN — OXYCODONE HYDROCHLORIDE 10 MG: 10 TABLET ORAL at 08:31

## 2023-02-09 RX ADMIN — SENNOSIDES AND DOCUSATE SODIUM 2 TABLET: 50; 8.6 TABLET ORAL at 20:12

## 2023-02-09 RX ADMIN — TRAZODONE HYDROCHLORIDE 100 MG: 100 TABLET ORAL at 20:12

## 2023-02-09 RX ADMIN — ZIPRASIDONE HYDROCHLORIDE 40 MG: 40 CAPSULE ORAL at 20:12

## 2023-02-09 RX ADMIN — OXYCODONE HYDROCHLORIDE 10 MG: 10 TABLET ORAL at 04:25

## 2023-02-09 RX ADMIN — Medication 2000 UNITS: at 08:33

## 2023-02-09 RX ADMIN — HYDROMORPHONE HYDROCHLORIDE 2 MG: 2 TABLET ORAL at 17:11

## 2023-02-09 RX ADMIN — PHENOL 1 SPRAY: 1.5 LIQUID ORAL at 17:19

## 2023-02-09 RX ADMIN — DIPHENHYDRAMINE HCL 50 MG: 25 TABLET ORAL at 20:11

## 2023-02-09 RX ADMIN — OXYCODONE HYDROCHLORIDE 10 MG: 10 TABLET ORAL at 20:13

## 2023-02-09 RX ADMIN — ENOXAPARIN SODIUM 40 MG: 40 INJECTION SUBCUTANEOUS at 17:11

## 2023-02-09 RX ADMIN — SODIUM CHLORIDE 1 G: 1 TABLET ORAL at 17:11

## 2023-02-09 RX ADMIN — SENNOSIDES AND DOCUSATE SODIUM 2 TABLET: 50; 8.6 TABLET ORAL at 08:32

## 2023-02-09 RX ADMIN — GABAPENTIN 300 MG: 300 CAPSULE ORAL at 20:12

## 2023-02-09 RX ADMIN — PHENOL 1 SPRAY: 1.5 LIQUID ORAL at 11:25

## 2023-02-09 RX ADMIN — METHOCARBAMOL 500 MG: 500 TABLET ORAL at 20:12

## 2023-02-09 RX ADMIN — GABAPENTIN 300 MG: 300 CAPSULE ORAL at 08:33

## 2023-02-09 RX ADMIN — Medication 10.5 MG: at 20:13

## 2023-02-09 ASSESSMENT — ACTIVITIES OF DAILY LIVING (ADL)
TOILETING_LEVEL_OF_ASSIST: ABLE TO COMPLETE TOILETING WITHOUT ASSIST
BED_CHAIR_WHEELCHAIR_TRANSFER_DESCRIPTION: ADAPTIVE EQUIPMENT
TOILET_TRANSFER_LEVEL_OF_ASSIST: ABLE TO COMPLETE TOILET TRANSFER WITHOUT ASSIST
SHOWER_TRANSFER_LEVEL_OF_ASSIST: ABLE TO COMPLETE SHOWER TRANSFER WITHOUT ASSIST
TUB_SHOWER_TRANSFER_DESCRIPTION: GRAB BAR;SHOWER BENCH

## 2023-02-09 ASSESSMENT — ENCOUNTER SYMPTOMS
DIZZINESS: 0
SHORTNESS OF BREATH: 0
COUGH: 0
PALPITATIONS: 0
MYALGIAS: 0
LOSS OF CONSCIOUSNESS: 0

## 2023-02-09 ASSESSMENT — PAIN DESCRIPTION - PAIN TYPE
TYPE: ACUTE PAIN
TYPE: ACUTE PAIN

## 2023-02-09 ASSESSMENT — GAIT ASSESSMENTS
DEVIATION: DECREASED TOE OFF
ASSISTIVE DEVICE: 4 WHEEL WALKER
GAIT LEVEL OF ASSIST: SUPERVISED
DISTANCE (FEET): 150

## 2023-02-09 ASSESSMENT — BRIEF INTERVIEW FOR MENTAL STATUS (BIMS)
WHAT YEAR IS IT: CORRECT
INITIAL REPETITION OF BED BLUE SOCK - FIRST ATTEMPT: 3
BIMS SUMMARY SCORE: 15
ASKED TO RECALL BLUE: YES, NO CUE REQUIRED
ASKED TO RECALL SOCK: YES, NO CUE REQUIRED
ASKED TO RECALL BED: YES, NO CUE REQUIRED
WHAT DAY OF THE WEEK IS IT: CORRECT
WHAT MONTH IS IT: ACCURATE WITHIN 5 DAYS

## 2023-02-09 ASSESSMENT — PAIN SCALES - WONG BAKER: WONGBAKER_NUMERICALRESPONSE: DOESN'T HURT AT ALL

## 2023-02-09 NOTE — THERAPY
Occupational Therapy  Daily Treatment     Patient Name: Kristin Balderrama  Age:  32 y.o., Sex:  female  Medical Record #: [id number]  Today's Date: 2/9/2023     Precautions  Precautions: Fall Risk, Weight Bearing As Tolerated Left Lower Extremity  Comments: POTS, Annetta-Danlos syndrome, schizophrenia    Safety   ADL Safety : Requires Supervision for Safety  Bathroom Safety: Requires Supervision for Safety    Subjective    Patient was lying in bed on her phone upon arrival. She mentioned that she was in the middle of a text message conversation regarding her classmates from her medical assistance program. Patient was agreeable to shower during OT session. Patient stated that she wanted to work on kitchen mobility during next session.      Objective       02/09/23 1031   Functional Level of Assist   Grooming Modified Independent   Grooming Description Standing at sink   Bathing Modified Independent   Bathing Description Grab bar;Tub bench;Hand held shower   Upper Body Dressing Modified Independent   Upper Body Dressing Description Assist device equipment   Lower Body Dressing Modified Independent   Lower Body Dressing Description Sock aid;Dressing stick;Assistive devices   Bed, Chair, Wheelchair Transfer Modified Independent   Bed Chair Wheelchair Transfer Description Adaptive equipment   Tub / Shower Transfers Modified Independent   Tub Shower Transfer Description Grab bar;Shower bench   Sitting Upper Body Exercises   Sitting Upper Body Exercises Yes   Chest Press 3 sets of 10;Weight (See Comments for lbs)  (4lbs)   Shoulder Press 3 sets of 10;Weight (See Comments for lbs)  (4lbs)   External Shoulder Rotation 3 sets of 10;Weight (See Comments for lbs)  (0lbs)   Bicep Curls 3 sets of 10;Weight (See Comments for lbs)  (4lbs)   Bed Mobility    Supine to Sit Modified Independent   Scooting Modified Independent   Interdisciplinary Plan of Care Collaboration   IDT Collaboration with  Physician   Patient Position at End of  Therapy Seated;Other (Comments)  (in cafeteria for lunch)   Collaboration Comments checked in with patient     Functional mobility with 4WW and modified independent to main gym from room and to cafeteria.     Assessment    Patient was pleasant and tolerated session well. After the 3 UE exercises, patient stated that her arms were tired. She agreed to do one more UE exercise without weight. Patient had no LOB during session.       Strengths: Able to follow instructions, Adequate strength, Alert and oriented, Effective communication skills, Good insight into deficits/needs, Independent prior level of function, Motivated for self care and independence, Pleasant and cooperative, Willingly participates in therapeutic activities  Barriers: Decreased endurance, Fatigue, Impaired activity tolerance, Impaired balance, Pain, Orthostatic hypotension    Plan    Kitchen mobility, core strengthening, UE strengthening. Complete the BIMS fo d/c tomorrow.      Occupational Therapy Goals (Active)       Problem: Bathing       Dates: Start: 02/01/23         Goal: STG-Within one week, patient will bathe with supervision and AE as needed.       Dates: Start: 02/01/23         Goal Note filed on 02/02/23 1137 by Sesar Leach OT/GIULIANO       Min A                 Problem: Dressing       Dates: Start: 02/01/23         Goal: STG-Within one week, patient will retrieve clothing with supervision and AE as needed.       Dates: Start: 02/01/23         Goal Note filed on 02/02/23 1137 by Sesar Leach OT/L       SBA/CGA with FWW                 Problem: Functional Transfers       Dates: Start: 02/01/23         Goal: STG-Within one week, patient will transfer to toilet with SBA.       Dates: Start: 02/01/23         Goal Note filed on 02/02/23 1137 by Sesar Leach OT/L       CGA                  Problem: OT Long Term Goals       Dates: Start: 02/01/23         Goal: LTG-By discharge, patient will complete basic self care tasks with  supervision.       Dates: Start: 02/01/23            Goal: LTG-By discharge, patient will perform bathroom transfers with supervision.       Dates: Start: 02/01/23            Goal: LTG-By discharge, patient will complete basic home management with supervision.       Dates: Start: 02/01/23

## 2023-02-09 NOTE — CARE PLAN
Problem: Bathing  Goal: STG-Within one week, patient will bathe with supervision and AE as needed.  Outcome: Met  Note: Mod I     Problem: Dressing  Goal: STG-Within one week, patient will retrieve clothing with supervision and AE as needed.  Outcome: Met  Note: Mod I     Problem: Functional Transfers  Goal: STG-Within one week, patient will transfer to toilet with SBA.  Outcome: Met  Note: Mod I

## 2023-02-09 NOTE — FLOWSHEET NOTE
02/08/23 1850   Events/Summary/Plan   Events/Summary/Plan SpO2 check   Vital Signs   Pulse 70   Respiration 18   Pulse Oximetry 95 %   $ Pulse Oximetry (Spot Check) Yes   Oxygen   O2 Delivery Device None - Room Air

## 2023-02-09 NOTE — CARE PLAN
Problem: Mobility  Goal: STG-Within one week, patient will ambulate household distance 50 ft x2 with FWW and SBA.  Outcome: Met  Goal: STG-Within one week, patient will ambulate up/down a curb with FWW and CGA.  Outcome: Met  Goal: STG-Within one week, patient will ascend and descend four to six stairs with min A and BHR as needed.  Outcome: Met     Problem: Mobility Transfers  Goal: STG-Within one week, patient will transfer bed to chair with FWW and SBA.  Outcome: Met

## 2023-02-09 NOTE — PROGRESS NOTES
Assumed care of patient. Bedside report received from Joe HASSAN. Patient is in bed. Fall precautions in place. Call light and belongings within reach. Updated on POC, all questions and concerns answered at this time. No other needs at this time.

## 2023-02-09 NOTE — DISCHARGE PLANNING
Case Management/IDT follow up.   IDT continues to recommend IRF level of care as patient continue to make progress with all therapies.   Projected dc date set for: 2/10/2023      DC needs:  Recommendations made for home health for PT/OT/SLP  Follow up with: pain management on the 2/14 and pcp.     Plan:  Continue to follow

## 2023-02-09 NOTE — DISCHARGE INSTRUCTIONS
[facility] NURSING DISCHARGE INSTRUCTIONS    Blood Pressure: 96/45  Weight: 107 kg (235 lb 14.3 oz)  Nursing recommendations for Kristin Balderrama at time of discharge are as follows:  Client verbalized understanding of all discharge instructions and prescriptions.     Review all your home medications and newly ordered medications with your doctor and/or pharmacist. Follow medication instructions as directed by your doctor and/or pharmacist.    Pain Management:   Discharge Pain Medication Instructions: HYDROmorphone 2mg tabs. Take 1 tablet by mouth every 12 hours as needed for Severe Pain for up to 14 days. oxyCODONE immediate release 10 mg tablet. Take 1 tablet by mouth every 6 hours as needed for Moderate Pain for up to 14 days.  Comfort Goal: Sleep Comfortably, perform activities of daily living.   Intervention: Medication (see MAR), Repositioned, Rest  Notify your primary care provider if pain is unrelieved with these measures, if the pain is new, or increased in intensity.    Discharge Skin Characteristics: Warm, Dry  Discharge Skin Exam: surgical incisions x3 to left hip with grayson-incisional bruising to left hip.    Skin / Wound Care Instructions: Please contact your primary care physician for any change in skin integrity.     If You Have Surgical Incisions / Wounds:  Monitor surgical site(s) for signs of increased swelling, redness or symptoms of drainage from the site or fever as this could indicate signs and symptoms of infection. If these symptoms are noted, notifiy your primary care provider.      Discharge Safety Instructions: No Supervision Needed     Discharge Safety Concerns: No Concerns Noted  The interdisciplinary team has made recommendation that you do not require supervision in the house due to demonstration of safety with ADL's and IADLS and problem solving skills  Anti-embolic stockings are not required to increase circulation to the lower extremities.    Discharge  Diet:Regular  Discharge Liquids: Thin  Discharge Bowel Function: Continent  Please contact your primary care physician for any changes in bowel habits.  Discharge Bowel Program:    Discharge Bladder Function: Continent  Discharge Urinary Devices: None      Nursing Discharge Plan:   Influenza Vaccine Indication: Not indicated: Previously immunized this influenza season and > 8 years of age    Case Management Discharge Instructions:   Discharge Location:    Agency Name/Address/Phone:    Home Health:    Outpatient Services:    DME Provider/Phone:    Medical Equipment Ordered:    Prescription Faxed to:        Discharge Medication Instructions:  Below are the medications your physician expects you to take upon discharge:      Fall Prevention in the Home, Adult  Falls can cause injuries and can affect people from all age groups. There are many simple things that you can do to make your home safe and to help prevent falls. Ask for help when making these changes, if needed.  What actions can I take to prevent falls?  General instructions  Use good lighting in all rooms. Replace any light bulbs that burn out.  Turn on lights if it is dark. Use night-lights.  Place frequently used items in easy-to-reach places. Lower the shelves around your home if necessary.  Set up furniture so that there are clear paths around it. Avoid moving your furniture around.  Remove throw rugs and other tripping hazards from the floor.  Avoid walking on wet floors.  Fix any uneven floor surfaces.  Add color or contrast paint or tape to grab bars and handrails in your home. Place contrasting color strips on the first and last steps of stairways.  When you use a stepladder, make sure that it is completely opened and that the sides are firmly locked. Have someone hold the ladder while you are using it. Do not climb a closed stepladder.  Be aware of any and all pets.  What can I do in the bathroom?         Keep the floor dry. Immediately clean up any  water that spills onto the floor.  Remove soap buildup in the tub or shower on a regular basis.  Use non-skid mats or decals on the floor of the tub or shower.  Attach bath mats securely with double-sided, non-slip rug tape.  If you need to sit down while you are in the shower, use a plastic, non-slip stool.  Install grab bars by the toilet and in the tub and shower. Do not use towel bars as grab bars.  What can I do in the bedroom?  Make sure that a bedside light is easy to reach.  Do not use oversized bedding that drapes onto the floor.  Have a firm chair that has side arms to use for getting dressed.  What can I do in the kitchen?  Clean up any spills right away.  If you need to reach for something above you, use a sturdy step stool that has a grab bar.  Keep electrical cables out of the way.  Do not use floor polish or wax that makes floors slippery. If you must use wax, make sure that it is non-skid floor wax.  What can I do in the stairways?  Do not leave any items on the stairs.  Make sure that you have a light switch at the top of the stairs and the bottom of the stairs. Have them installed if you do not have them.  Make sure that there are handrails on both sides of the stairs. Fix handrails that are broken or loose. Make sure that handrails are as long as the stairways.  Install non-slip stair treads on all stairs in your home.  Avoid having throw rugs at the top or bottom of stairways, or secure the rugs with carpet tape to prevent them from moving.  Choose a carpet design that does not hide the edge of steps on the stairway.  Check any carpeting to make sure that it is firmly attached to the stairs. Fix any carpet that is loose or worn.  What can I do on the outside of my home?  Use bright outdoor lighting.  Regularly repair the edges of walkways and driveways and fix any cracks.  Remove high doorway thresholds.  Trim any shrubbery on the main path into your home.  Regularly check that handrails are  securely fastened and in good repair. Both sides of any steps should have handrails.  Install guardrails along the edges of any raised decks or porches.  Clear walkways of debris and clutter, including tools and rocks.  Have leaves, snow, and ice cleared regularly.  Use sand or salt on walkways during winter months.  In the garage, clean up any spills right away, including grease or oil spills.  What other actions can I take?  Wear closed-toe shoes that fit well and support your feet. Wear shoes that have rubber soles or low heels.  Use mobility aids as needed, such as canes, walkers, scooters, and crutches.  Review your medicines with your health care provider. Some medicines can cause dizziness or changes in blood pressure, which increase your risk of falling.  Talk with your health care provider about other ways that you can decrease your risk of falls. This may include working with a physical therapist or  to improve your strength, balance, and endurance.  Where to find more information  Centers for Disease Control and PreventionNOEL: https://www.cdc.gov  National [place] on Aging: https://hu9vnoq.mike.nih.gov  Contact a health care provider if:  You are afraid of falling at home.  You feel weak, drowsy, or dizzy at home.  You fall at home.  Summary  There are many simple things that you can do to make your home safe and to help prevent falls.  Ways to make your home safe include removing tripping hazards and installing grab bars in the bathroom.  Ask for help when making these changes in your home.  This information is not intended to replace advice given to you by your health care provider. Make sure you discuss any questions you have with your health care provider.  Document Released: 12/08/2003 Document Revised: 11/30/2018 Document Reviewed: 08/02/2018  Elsevier Patient Education © 2020 Elsevier Inc.      Depression / Suicide Risk    As you are discharged from this [facility] facility, it is  important to learn how to keep safe from harming yourself.    Recognize the warning signs:  Abrupt changes in personality, positive or negative- including increase in energy   Giving away possessions  Change in eating patterns- significant weight changes-  positive or negative  Change in sleeping patterns- unable to sleep or sleeping all the time   Unwillingness or inability to communicate  Depression  Unusual sadness, discouragement and loneliness  Talk of wanting to die  Neglect of personal appearance   Rebelliousness- reckless behavior  Withdrawal from people/activities they love  Confusion- inability to concentrate     If you or a loved one observes any of these behaviors or has concerns about self-harm, here's what you can do:  Talk about it- your feelings and reasons for harming yourself  Remove any means that you might use to hurt yourself (examples: pills, rope, extension cords, firearm)  Get professional help from the community (Mental Health, Substance Abuse, psychological counseling)  Do not be alone:Call your Safe Contact- someone whom you trust who will be there for you.  Call your local CRISIS HOTLINE 017-4606 or 693-763-4397  Call your local Children's Mobile Crisis Response Team [place] (816) 232-1494 or www.[place]  Call the toll free National Suicide Prevention Hotlines   National Suicide Prevention Lifeline 899-389-MMOL (2525)  National Hope Line Network 800-SUICIDE (242-1958)        Physical Therapy Discharge Instructions for Kristin Balderrama    2/9/2023    Weight Bearing Status - Patient Should: Bear Weight as Tolerated Left Leg  Level of Assist Required for Ambulation: No Assist on Flat Surfaces, Supervision on Curbs, Supervision on Stairs  Distance Patient May Ambulate: as much as tolerated  Device Recommended for Ambulation: 4-Wheeled Walker  Level of Assist Required to Propel Wheelchair: Requires No Assist  Level of Assist Required for Transfers: Requires No Assist  Device  Recommended for Transfers: 4-Wheeled Walker  Home Exercise Program: Refer to Home Exercise Program Handout for Details  It was a pleasure working with you, Kristin! Best of luck with everything. -Luz Maria, PT     Occupational Therapy Discharge Instructions for Kristin Balderrama    2/9/2023    Level of Assist Required for Eating: Able to Complete Eating without Assist  Level of Assist Required for Grooming: Able to Complete Grooming without Assist  Level of Assist Required for Dressing: Able to Complete Dressing without Assist  Equipment for Dressing: Sock Aid, Dressing Stick  Level of Assist Required for Toileting: Able to Complete Toileting without Assist  Level of Assist Required for Toilet Transfer: Able to Complete Toilet Transfer without Assist  Level of Assist Required for Bathing: Able to Complete Bathing without Assist  Equipment for Bathing: Tub Transfer Bench, Long Handled Sponge  Level of Assist Required for Shower Transfer: Able to Complete Shower Transfer without Assist  Equipment for Shower Transfer: Tub Transfer Bench  Level of Assist Required for Home Mgmt: Able to Complete Home Management without Assist  Level of Assist Required for Meal Prep: Able to Complete Meal Preparation without Assist  Driving: Please Contact Physician Prior to Driving  Home Exercise Program: None Issued

## 2023-02-09 NOTE — THERAPY
Physical Therapy   Daily Treatment     Patient Name: Kristin Balderrama  Age:  32 y.o., Sex:  female  Medical Record #: [id number]  Today's Date: 2/9/2023     Precautions  Precautions: Fall Risk, Weight Bearing As Tolerated Left Lower Extremity  Comments: POTS, Annetta-Danlos syndrome, schizophrenia    Subjective    Pt received resting in bed, agreeable to participate.     Objective       02/09/23 1301   PT Charge Group   PT Therapeutic Activities (Units) 2   PT Total Time Spent   PT Individual Total Time Spent (Mins) 30   Interdisciplinary Plan of Care Collaboration   IDT Collaboration with  Pharmacist   Patient Position at End of Therapy Other (Comments)  (walking in room with 4WW)   Collaboration Comments reviewed medication d/c needs with pt        02/09/23 0901   Roll Left and Right   Assistance Needed Independent   CARE Score - Roll Left and Right 6   Sit to Lying   Assistance Needed Independent   CARE Score - Sit to Lying 6   Lying to Sitting on Side of Bed   Assistance Needed Independent   CARE Score - Lying to Sitting on Side of Bed 6   Sit to Stand   Assistance Needed Independent   CARE Score - Sit to Stand 6   Chair/Bed-to-Chair Transfer   Assistance Needed Independent   CARE Score - Chair/Bed-to-Chair Transfer 6   Car Transfer   Assistance Needed Independent   CARE Score - Car Transfer 6   Walk 10 Feet   Assistance Needed Independent   CARE Score - Walk 10 Feet 6   Walk 50 Feet with Two Turns   Assistance Needed Independent   CARE Score - Walk 50 Feet with Two Turns 6   Walk 150 Feet   Assistance Needed Independent   CARE Score - Walk 150 Feet 6   Walking 10 Feet on Uneven Surfaces   Assistance Needed Independent   CARE Score - Walking 10 Feet on Uneven Surfaces 6   1 Step (Curb)   Assistance Needed Independent   CARE Score - 1 Step (Curb) 6   4 Steps   Assistance Needed Independent   CARE Score - 4 Steps 6   12 Steps   Assistance Needed Independent   CARE Score - 12 Steps 6   Picking Up Object    Assistance Needed Independent   CARE Score - Picking Up Object 6   Wheel 50 Feet with Two Turns   Reason if not Attempted Activity not applicable   CARE Score - Wheel 50 Feet with Two Turns 9   Wheel 150 Feet   Reason if not Attempted Activity not applicable   CARE Score - Wheel 150 Feet 9   P.T. Discharge Summary   Discharge Location Home   Patient Discharging with Assist of Family   Level of Supervision Required Upon Discharge No Supervision   Recommended Equipment for Discharge 4-Wheeled Walker   Recommeded Services Upon Discharge Outpatient Physical Therapy   Long Term Goals Met 4   Long Term Goals Not Met 0   Criteria for Termination of Services Maximum Function Achieved for Inpatient Rehabilitation   Discharge Instructions to Patient   Weight Bearing Status - Patient Should Bear Weight as Tolerated Left Leg   Level of Assist Required for Ambulation No Assist on Flat Surfaces;Supervision on Curbs;Supervision on Stairs   Distance Patient May Ambulate as much as tolerated   Device Recommended for Ambulation 4-Wheeled Walker   Level of Assist Required to Propel Wheelchair Requires No Assist   Level of Assist Required for Transfers Requires No Assist   Device Recommended for Transfers 4-Wheeled Walker   Home Exercise Program Refer to Home Exercise Program Handout for Details     See d/c IRF MICHEL flowsheet above for d/c assessment completed at 0901 and 1301 today.     Provided and reviewed standing HEP and fall prevention/home safety packets.    Assessment    Pt denies any further questions for this therapist at this time. Pt has progressed in therapy as above and is appropriate for anticipated d/c to home on 02/10/23 with family support.     Strengths: Able to follow instructions, Adequate strength, Alert and oriented, Effective communication skills, Good carryover of learning, Good insight into deficits/needs, Independent prior level of function, Manages pain appropriately, Motivated for self care and  independence, Pleasant and cooperative, Supportive family, Willingly participates in therapeutic activities  Barriers: Decreased endurance, Fatigue, Impaired activity tolerance, Impaired balance, Limited mobility, Pain (hx of POTS with frequent falls at home)    Plan    Anticipated d/c to home on 02/10/23 with family support    Passport items to be completed: Completed    Physical Therapy Problems (Active)       There are no active problems.

## 2023-02-09 NOTE — THERAPY
Physical Therapy   Daily Treatment     Patient Name: Kristin Balderrama  Age:  32 y.o., Sex:  female  Medical Record #: [id number]  Today's Date: 2/8/2023     Precautions  Precautions: Fall Risk, Weight Bearing As Tolerated Left Lower Extremity  Comments: POTS, Annetta-Danlos syndrome, schizophrenia    Subjective    Patient agreeable to outside for walking; reports she is worried about participating in fall recovery and shuttle strengthening yesterday caused hip damage- MD ordered x-ray to assess     Objective       02/08/23 1001   PT Charge Group   PT Gait Training (Units) 3   PT Therapeutic Activities (Units) 1   PT Total Time Spent   PT Individual Total Time Spent (Mins) 60   Precautions   Precautions Fall Risk;Weight Bearing As Tolerated Left Lower Extremity   Comments POTS, Annetta-Danlos syndrome, schizophrenia   Gait Functional Level of Assist    Gait Level Of Assist Standby Assist   Assistive Device 4 Wheel Walker   Distance (Feet) 300  (300ft x 4 with 4WW, patient requests rest breaks due to left hip pain)   Sitting Lower Body Exercises   Nustep Resistance Level 1  (7min cool-down)   Interdisciplinary Plan of Care Collaboration   IDT Collaboration with  Occupational Therapist;Physician   Collaboration Comments treatment planning; MD to order x-ray after increase in pain after therapy yesterday         Assessment    Self-limits ambulation distance based on sharp left hip pain, 2 episodes with walking today; relieved with 1-2min seated break, then able to continue ambulation task    Strengths: Able to follow instructions, Adequate strength, Alert and oriented, Effective communication skills, Good carryover of learning, Good insight into deficits/needs, Independent prior level of function, Manages pain appropriately, Motivated for self care and independence, Pleasant and cooperative, Supportive family, Willingly participates in therapeutic activities  Barriers: Decreased endurance, Fatigue, Impaired activity  tolerance, Impaired balance, Limited mobility, Pain (hx of POTS with frequent falls at home)    Plan    Gait with 4WW vs no AD, standing balance no UE support, stairs/curb training, energy conservation/endurance, LE/core strengthening, monitor dizziness with position changes d/t hx of POTS     Passport items to be completed:  Get in/out of bed safely, in/out of a vehicle, safely use mobility device, walk or wheel around home/community, navigate up and down stairs, show how to get up/down from the ground, ensure home is accessible, demonstrate HEP, complete caregiver training    Physical Therapy Problems (Active)       Problem: Mobility       Dates: Start: 02/01/23         Goal: STG-Within one week, patient will ambulate household distance 50 ft x2 with FWW and SBA.       Dates: Start: 02/01/23         Goal Note filed on 02/02/23 1349 by Luz Maria Acuña, PT       Up to 85 ft x3 with CGA              Goal: STG-Within one week, patient will ambulate up/down a curb with FWW and CGA.       Dates: Start: 02/01/23         Goal Note filed on 02/02/23 1349 by Luz Maria Acuña, PT       Initiated curb step training today              Goal: STG-Within one week, patient will ascend and descend four to six stairs with min A and BHR as needed.       Dates: Start: 02/01/23         Goal Note filed on 02/02/23 1349 by Luz Maria Acuña, PT       Not yet initiated                 Problem: Mobility Transfers       Dates: Start: 02/01/23         Goal: STG-Within one week, patient will transfer bed to chair with FWW and SBA.       Dates: Start: 02/01/23         Goal Note filed on 02/02/23 1349 by Luz Maria Acuña, PT       CGA                 Problem: PT-Long Term Goals       Dates: Start: 02/01/23         Goal: LTG-By discharge, patient will ambulate 150 ft x2 with FWW vs LRAD and SBA.       Dates: Start: 02/01/23            Goal: LTG-By discharge, patient will transfer one surface to another with FWW vs LRAD and SPV.       Dates: Start: 02/01/23             Goal: LTG-By discharge, patient will ambulate up/down flight of stairs with SBA and RHR.       Dates: Start: 02/01/23            Goal: LTG-By discharge, patient will transfer in/out of a car with FWW vs LRAD and set up assist.       Dates: Start: 02/01/23

## 2023-02-09 NOTE — THERAPY
"Physical Therapy   Daily Treatment     Patient Name: Kristin Balderrama  Age:  32 y.o., Sex:  female  Medical Record #: [id number]  Today's Date: 2/9/2023     Precautions  Precautions: Fall Risk, Weight Bearing As Tolerated Left Lower Extremity  Comments: POTS, Annetta-Danlos syndrome, schizophrenia    Subjective    Pt agreeable to participate, reports just receiving pain meds.      Objective       02/09/23 0901   PT Charge Group   PT Gait Training (Units) 2   PT Therapeutic Exercise (Units) 1   PT Therapeutic Activities (Units) 1   PT Total Time Spent   PT Individual Total Time Spent (Mins) 60   Pain   Intervention Medication (see MAR);Repositioned;Rest   Pain 0 - 10 Group   Location Hip   Location Orientation Left   Pain Rating Scale (NPRS) 7   Description Aching   Therapist Pain Assessment Prior to Activity   Non Verbal Descriptors   Non Verbal Scale  Calm   Gait Functional Level of Assist    Gait Level Of Assist Supervised  (Pt ambulates additional 3x 80ft with RUE SPC and SBA. Pt with good use of cane, no significant deviations noted. Pt ambulates multiple bouts with no UE support in // bars with SBA, mirror used for visual feedback per request. Cues for push off provided.)   Assistive Device 4 Wheel Walker   Distance (Feet) 150   # of Times Distance was Traveled 4   Deviation Decreased Toe Off   Stairs Functional Level of Assist   Level of Assist with Stairs Supervised   # of Stairs Climbed 12  (6\" steps, step to pattern post cues)   Stairs Description Extra time  (R ascending HR)   Transfer Functional Level of Assist   Bed, Chair, Wheelchair Transfer Modified Independent   Bed Chair Wheelchair Transfer Description   (4WW)   Sitting Lower Body Exercises   Nustep Resistance Level 3;Time (See Comments)  (10 min, BLE only)   Standing Lower Body Exercises   Standing Lower Body Exercises Yes   Hip Abduction 1 set of 15;Bilateral  (no UE support)   Marching 1 set of 15  (BLE, no UE support)   Heel Rise 1 set of " "15;Bilateral  (no UE support)   Bed Mobility    Sit to Supine Independent   Interdisciplinary Plan of Care Collaboration   Patient Position at End of Therapy In Bed;Call Light within Reach       6\" curb performed 3x with 4WW and CS. Cues provided for technique.     Ramp performed 2x with 4WW and CS, cues for technique.    Education provided on importance of activity, benefits of therapy, safety in room, call bell use, rest breaks.     Assessment    Pt participates well with multiple bouts of ambulation this session, rest breaks self initiated as needed. Pt limited by fatigue and poor activity tolerance toward end of session. Pt returned to bed at end of session d/t fatigue, all needs met. Pt denies ice.     Strengths: Able to follow instructions, Adequate strength, Alert and oriented, Effective communication skills, Good carryover of learning, Good insight into deficits/needs, Independent prior level of function, Manages pain appropriately, Motivated for self care and independence, Pleasant and cooperative, Supportive family, Willingly participates in therapeutic activities  Barriers: Decreased endurance, Fatigue, Impaired activity tolerance, Impaired balance, Limited mobility, Pain (hx of POTS with frequent falls at home)    Plan    Gait with 4WW vs no AD, standing balance no UE support, stairs/curb training, energy conservation/endurance, LE/core strengthening, monitor dizziness with position changes d/t hx of POTS     Passport items to be completed:  Get in/out of bed safely, in/out of a vehicle, safely use mobility device, walk or wheel around home/community, navigate up and down stairs, show how to get up/down from the ground, ensure home is accessible, demonstrate HEP, complete caregiver training    Physical Therapy Problems (Active)       Problem: Mobility       Dates: Start: 02/01/23         Goal: STG-Within one week, patient will ambulate household distance 50 ft x2 with FWW and SBA.       Dates: Start: " 02/01/23         Goal Note filed on 02/02/23 1349 by Luz Maria Acuña, PT       Up to 85 ft x3 with CGA              Goal: STG-Within one week, patient will ambulate up/down a curb with FWW and CGA.       Dates: Start: 02/01/23         Goal Note filed on 02/02/23 1349 by Luz Maria Acuña, PT       Initiated curb step training today              Goal: STG-Within one week, patient will ascend and descend four to six stairs with min A and BHR as needed.       Dates: Start: 02/01/23         Goal Note filed on 02/02/23 1349 by Luz Maria Acuña, PT       Not yet initiated                 Problem: Mobility Transfers       Dates: Start: 02/01/23         Goal: STG-Within one week, patient will transfer bed to chair with FWW and SBA.       Dates: Start: 02/01/23         Goal Note filed on 02/02/23 1349 by Luz Maria Acuña, PT       CGA                 Problem: PT-Long Term Goals       Dates: Start: 02/01/23         Goal: LTG-By discharge, patient will ambulate 150 ft x2 with FWW vs LRAD and SBA.       Dates: Start: 02/01/23            Goal: LTG-By discharge, patient will transfer one surface to another with FWW vs LRAD and SPV.       Dates: Start: 02/01/23            Goal: LTG-By discharge, patient will ambulate up/down flight of stairs with SBA and RHR.       Dates: Start: 02/01/23            Goal: LTG-By discharge, patient will transfer in/out of a car with FWW vs LRAD and set up assist.       Dates: Start: 02/01/23

## 2023-02-09 NOTE — PROGRESS NOTES
Physical Medicine & Rehabilitation Progress Note    Encounter Date: 2/9/2023    Chief Complaint: Decreased mobility    Interval Events (Subjective):  Patient sitting up in room. Reviewed XR of hip showed no changes in hardware. She reports pain is better controlled. She reports occasional dilaudid use but trying to use Oxycodone. Discussed would send in medications later today. She would like meds 2 beds. Discussed would provide with narcotics until pain clinic appointment next week. Discussed would have IDT later today to discuss final discharge planning. She just had follow-up with her cardiac doctor for POTS and recommending high salt diet, will add supplement.     _____________________________________  Interdisciplinary Team Conference   Most recent IDT on 2/9/2023    ICraig M.D./Ph.D., was present and led the interdisciplinary team conference on 2/9/2023.  I led the IDT conference and agree with the IDT conference documentation and plan of care as noted below.     Nursing:  Diet Current Diet Order   Procedures    Diet Order Diet: Regular       Eating ADL Independent      % of Last Meal  Oral Nutrition: Lunch, Between % Consumed   Sleep    Bowel Last BM: 02/08/23   Bladder    Barriers to Discharge Home:  Pain control    Physical Therapy:  Bed Mobility    Transfers Modified Independent  Adaptive equipment   Mobility Supervised (Pt ambulates additional 3x 80ft with RUE SPC and SBA. Pt with good use of cane, no significant deviations noted. Pt ambulates multiple bouts with no UE support in // bars with SBA, mirror used for visual feedback per request. Cues for push off provided.)   Stairs    Barriers to Discharge Home:  None    Occupational Therapy:  Grooming Modified Independent   Bathing Modified Independent   UB Dressing Modified Independent   LB Dressing Modified Independent   Toileting Supervision   Shower & Transfer    Barriers to Discharge Home:  None    Case  "Management:  Continues to work on disposition and DME needs.      Discharge Date/Disposition:  2/10/23  _____________________________________    Objective:  VITAL SIGNS: BP 98/58   Pulse 72   Temp 36.3 °C (97.4 °F) (Oral)   Resp 18   Ht 1.626 m (5' 4\")   Wt 107 kg (235 lb 14.3 oz)   SpO2 96%   BMI 40.49 kg/m²   Gen: NAD  Psych: Mood and affect appropriate  CV: RRR, 1+ LLE edema  Resp: CTAB, no upper airway sounds  Abd: NTND  Neuro: AOx4, following commands  MSK: normal ROM L hip  Unchanged from 2/8/23    Laboratory Values:  Recent Results (from the past 72 hour(s))   Group A Strep by PCR    Collection Time: 02/06/23  1:30 PM    Specimen: Throat   Result Value Ref Range    Group A Strep by PCR Not Detected Not Detected   CBC WITH DIFFERENTIAL    Collection Time: 02/08/23  5:46 AM   Result Value Ref Range    WBC 4.9 4.8 - 10.8 K/uL    RBC 4.17 (L) 4.20 - 5.40 M/uL    Hemoglobin 12.7 12.0 - 16.0 g/dL    Hematocrit 37.2 37.0 - 47.0 %    MCV 89.2 81.4 - 97.8 fL    MCH 30.5 27.0 - 33.0 pg    MCHC 34.1 33.6 - 35.0 g/dL    RDW 40.7 35.9 - 50.0 fL    Platelet Count 175 164 - 446 K/uL    MPV 11.8 9.0 - 12.9 fL    Neutrophils-Polys 45.10 44.00 - 72.00 %    Lymphocytes 40.60 22.00 - 41.00 %    Monocytes 8.20 0.00 - 13.40 %    Eosinophils 4.90 0.00 - 6.90 %    Basophils 0.60 0.00 - 1.80 %    Immature Granulocytes 0.60 0.00 - 0.90 %    Nucleated RBC 0.00 /100 WBC    Neutrophils (Absolute) 2.21 2.00 - 7.15 K/uL    Lymphs (Absolute) 1.99 1.00 - 4.80 K/uL    Monos (Absolute) 0.40 0.00 - 0.85 K/uL    Eos (Absolute) 0.24 0.00 - 0.51 K/uL    Baso (Absolute) 0.03 0.00 - 0.12 K/uL    Immature Granulocytes (abs) 0.03 0.00 - 0.11 K/uL    NRBC (Absolute) 0.00 K/uL   Basic Metabolic Panel    Collection Time: 02/08/23  5:46 AM   Result Value Ref Range    Sodium 137 135 - 145 mmol/L    Potassium 3.8 3.6 - 5.5 mmol/L    Chloride 106 96 - 112 mmol/L    Co2 21 20 - 33 mmol/L    Glucose 121 (H) 65 - 99 mg/dL    Bun 8 8 - 22 mg/dL    " Creatinine 0.62 0.50 - 1.40 mg/dL    Calcium 8.9 8.5 - 10.5 mg/dL    Anion Gap 10.0 7.0 - 16.0   MAGNESIUM    Collection Time: 02/08/23  5:46 AM   Result Value Ref Range    Magnesium 1.7 1.5 - 2.5 mg/dL   ESTIMATED GFR    Collection Time: 02/08/23  5:46 AM   Result Value Ref Range    GFR (CKD-EPI) 120 >60 mL/min/1.73 m 2   CBC WITH DIFFERENTIAL    Collection Time: 02/09/23  6:04 AM   Result Value Ref Range    WBC 4.6 (L) 4.8 - 10.8 K/uL    RBC 4.05 (L) 4.20 - 5.40 M/uL    Hemoglobin 12.4 12.0 - 16.0 g/dL    Hematocrit 35.7 (L) 37.0 - 47.0 %    MCV 88.1 81.4 - 97.8 fL    MCH 30.6 27.0 - 33.0 pg    MCHC 34.7 33.6 - 35.0 g/dL    RDW 39.8 35.9 - 50.0 fL    Platelet Count 117 (L) 164 - 446 K/uL    MPV 12.1 9.0 - 12.9 fL    Neutrophils-Polys 46.50 44.00 - 72.00 %    Lymphocytes 39.30 22.00 - 41.00 %    Monocytes 8.20 0.00 - 13.40 %    Eosinophils 4.80 0.00 - 6.90 %    Basophils 0.60 0.00 - 1.80 %    Immature Granulocytes 0.60 0.00 - 0.90 %    Nucleated RBC 0.00 /100 WBC    Neutrophils (Absolute) 2.15 2.00 - 7.15 K/uL    Lymphs (Absolute) 1.82 1.00 - 4.80 K/uL    Monos (Absolute) 0.38 0.00 - 0.85 K/uL    Eos (Absolute) 0.22 0.00 - 0.51 K/uL    Baso (Absolute) 0.03 0.00 - 0.12 K/uL    Immature Granulocytes (abs) 0.03 0.00 - 0.11 K/uL    NRBC (Absolute) 0.00 K/uL   Basic Metabolic Panel    Collection Time: 02/09/23  6:04 AM   Result Value Ref Range    Sodium 138 135 - 145 mmol/L    Potassium 3.9 3.6 - 5.5 mmol/L    Chloride 108 96 - 112 mmol/L    Co2 19 (L) 20 - 33 mmol/L    Glucose 138 (H) 65 - 99 mg/dL    Bun 7 (L) 8 - 22 mg/dL    Creatinine 0.65 0.50 - 1.40 mg/dL    Calcium 8.9 8.5 - 10.5 mg/dL    Anion Gap 11.0 7.0 - 16.0   ESTIMATED GFR    Collection Time: 02/09/23  6:04 AM   Result Value Ref Range    GFR (CKD-EPI) 119 >60 mL/min/1.73 m 2       Medications:  Scheduled Medications   Medication Dose Frequency    gabapentin  300 mg TID    vitamin D3  2,000 Units DAILY    senna-docusate  2 Tablet BID    omeprazole  20 mg  DAILY    enoxaparin (LOVENOX) injection  40 mg DAILY AT 1800    ivabradine  7.5 mg BID WITH MEALS    melatonin  10.5 mg QHS    methocarbamol  500 mg TID    metoprolol SR  25 mg QAM    traZODone  100 mg QHS    ziprasidone  40 mg QHS     PRN medications: sore throat spray, guaiFENesin ER, benzocaine-menthol, diphenhydrAMINE, Respiratory Therapy Consult, hydrALAZINE, acetaminophen, senna-docusate **AND** polyethylene glycol/lytes **AND** magnesium hydroxide **AND** bisacodyl, mag hydrox-al hydrox-simeth, ondansetron **OR** ondansetron, traZODone, sodium chloride, traMADol, acetaminophen, albuterol, ipratropium-albuterol, [DISCONTINUED] oxyCODONE immediate-release **OR** oxyCODONE immediate-release, HYDROmorphone    Diet:  Current Diet Order   Procedures    Diet Order Diet: Regular       Assessment:  Active Hospital Problems    Diagnosis     *Hip fracture, left, closed, initial encounter ([facility])     S/p left hip fracture     Moderate asthma     IBS (irritable bowel syndrome)     Schizoaffective disorder, depressive type ([facility])     TONYA (obstructive sleep apnea)     Morbidly obese ([facility])        Medical Decision Making and Plan:  Fall with left hip fracture - Patient s/p fall with hip fracture s/p IMN with Dr. Abdul on 1/28/23. Patient WBAT.  -PT and OT for mobility and ADLs. Per guidelines, 15 hours per week between PT, OT and SLP.  -Follow-up Dr. Abdul  -Worsened leg pain on 2/8/23 with feeling of 10/10 with instability. Check XR hip, high risk for injury as well as dislocation. If ongoing pain consider US to rule out hematoma, will check AM labs     Hyperglycemia - Not on any medication on transfer. Will check A1c - 5.1. No longer diabetic     Asthma - Patient on PRN Duoneb and Albuterol inhaler      POTS - Patient on Metoprolol 25 mg XL and Corlanor 7.5 mg BID. Cardiac recommending high salt diet, add salt tablets     New chest pain - Rapid response on 2/3/23 for chest pain. EKG to bedside with sinus rhythm. Stat  Troponin, CXR, CBC, CMP. Patient's case was discussed face to face with Hospitalist on [facility] floor. Will repeat Troponin in 6 hours. Patient at high risk for further injury due to history of POTS, obesity and antipsychotic usage which can affected QTc.   -Negative on work-up. Resolved on 2/3/23 repeat troponin negative.    Neuropathy - Patient on Gabapentin 100 mg TID -> 300 mg TID. High risk medication will need to monitor Cr. Will recheck - stable     Thrombocytopenia - Check AM CBC - 154, stable. Continue to monitor     Morbid obesity due to excess calories - BMI of 39.7 on admission, meets medical criteria. Dietitian to consult     Schizophrenia - Patient on Geodon 40 mg QHS and Trazodone      Pain - APAP/Gabapentin/Oxycodone and Robaxin TID  -Change PRN to Oxycodone 10 mg for moderate and Dilaudid PO 2 mg q3h. As having severe pain and has constipation with oxycodone. Dilaudid is high risk medication discussed about previous confusion/agitation and will need to monitor in setting of psych history.   I discussed the risks and benefits of using opiate medications for pain control.  I discussed the risk of addiction, potential for overdose, and respiratory depression (and the potential need for opiate antagonist therapy if this occurs).  I encouraged the patient to take this medication sparingly with the expressed goal of weaning off the medication as soon as is clinically appropriate.  I informed the patient that we are only able to provide up to a 14 day supply of these medications at discharge and that they will be responsible for requesting any refills needed from their primary care provider or their surgeon.  We discussed the need to safely secure these medications to prevent theft, inadvertent ingestion, or misuse.  Any unused medication should be immediately disposed of through a sanctioned medication disposal program.  We discussed adjunctive pain medications and conservative therapies at length.I answered  the patient's questions regarding this treatment, and the patient indicated understanding and willingness to proceed.    Sore throat - history of strep throat. Will check Strep PCR. Low threshold for check COVID. No other symptoms.   -Strep negative. Start chloraseptic spray and mucinex. Recheck labs    Skin - Patient at risk for skin breakdown due to debility in areas including sacrum, achilles, elbows and head in addition to other sites. Nursing to assess skin daily.      Vitamin D deficiency - acute on chronic. Start 2000 U     GI Ppx - Patient on Prilosec for GERD prophylaxis. Patient on Senna-docusate for constipation prophylaxis.      DVT Ppx - Patient Lovenox on transfer.  -Worsening left leg pain. Patient with decreased sensation and swelling to lower LE. Discussed stat DVT doppler. Did have SOB. Will check CXR. RT to give albuterol, if ongoing will get CTA PE. Patient at very high risk for DVT/PE due to fracture and obesity.   ____________________________________    T. Dhruv Simmons MD./PhD.  [facility] - Physical Medicine & Rehabilitation   [facility] - Brain Injury Medicine   ____________________________________    Total time:  51 minutes. Time spent included pre-rounding review of vitals and tests, unit/floor time, face-to-face time with the patient including physical examination, care coordination, counseling of patient and/or family, ordering medications/procedures/tests, discussion with CM, PT, OT, SLP and/or other healthcare providers, and documentation in the electronic medical record. Topics discussed included discharge planning, discharge medications, opiate counseling, and follow-up pain. Patient was discussed separately in IDT today; please see details above.

## 2023-02-09 NOTE — THERAPY
Occupational Therapy  Daily Treatment     Patient Name: Kristin Balderrama  Age:  32 y.o., Sex:  female  Medical Record #: [id number]  Today's Date: 2/9/2023     Precautions  Precautions: Fall Risk, Weight Bearing As Tolerated Left Lower Extremity  Comments: POTS, Annetta-Danlos syndrome, schizophrenia    Safety   ADL Safety : Requires Supervision for Safety  Bathroom Safety: Requires Supervision for Safety    Subjective    Patient was lying in bed upon arrival. Patient was agreeable to OT session. Patient mentioned she was worried about school.      Objective       02/09/23 1401   OT Charge Group   OT Therapy Activity (Units) 1   OT Therapeutic Exercise (Units) 1   OT Total Time Spent   OT Individual Total Time Spent (Mins) 30   Cosignature   Documentation Review Approved as originally documented and filed.   IADL Treatments   Kitchen Mobility Education patient rinsed jello bowl at kitchen skin and placed in . she then collected 10 cones in various locations and heights throughout kitchen. patient used counter top for stabilization as needed. patient was independent with activity.   Bed Mobility    Supine to Sit Modified Independent   Sit to Supine Modified Independent   Scooting Modified Independent   Rolling Modified Independent   Interdisciplinary Plan of Care Collaboration   Patient Position at End of Therapy Seated;Phone within Reach;Tray Table within Reach;Call Light within Reach     Core strengthening exercises at EOM with 6 lb ball. Patient tossed ball back and forth with therapist 3 x 10 and did 3 x 10 A/P leans.      Functional mobility with 4WW from room <> gym.     Assessment    Patient tolerated session well. Patient was less talkative due to received an e-mail from her school which concerned her. She has a slight LOB when standing up from bed, but recovered well. Patient increased weight used in core strengthening exercises.  No LOB while walking from room <> gym.     Strengths: Able to follow  instructions, Adequate strength, Alert and oriented, Effective communication skills, Good insight into deficits/needs, Independent prior level of function, Motivated for self care and independence, Pleasant and cooperative, Willingly participates in therapeutic activities  Barriers: Decreased endurance, Fatigue, Impaired activity tolerance, Impaired balance, Pain, Orthostatic hypotension    Plan    D/c tomorrow with grandmother and friend.    Occupational Therapy Goals (Active)       There are no active problems.

## 2023-02-09 NOTE — CARE PLAN
The patient is Stable - Low risk of patient condition declining or worsening    Shift Goals  Clinical Goals: Safety  Patient Goals: Sleep    Progress made toward(s) clinical / shift goals:    Problem: Knowledge Deficit - Standard  Goal: Patient and family/care givers will demonstrate understanding of plan of care, disease process/condition, diagnostic tests and medications  Outcome: Progressing   Patient educated on the POC and medications administered. Patient is MOD I in room. Calls appropriately when in need of assistance   Problem: Skin Integrity  Goal: Skin integrity is maintained or improved  Outcome: Progressing   Suture on left hip are clean, dry, and intact. Patient turns self in bed.   Problem: Pain - Standard  Goal: Alleviation of pain or a reduction in pain to the patient’s comfort goal  Outcome: Progressing   Use of 0-10 pain scale. PRN medications administered.     Patient is not progressing towards the following goals:

## 2023-02-09 NOTE — PROGRESS NOTES
Chief Complaint   Patient presents with    Supraventricular Tachycardia (SVT)    Chest Pain    Atrial Fibrillation     F/V Dx:PAF (paroxysmal atrial fibrillation) (HCC)       Subjective     Kristin Balderrama is a 2 y.o. female who presents today for follow-up cardiac care.    This evaluation was conducted via Zoom using secure and encrypted videoconferencing technology. The patient was in a private location outside of their home [facility] in the [facility].    The patient's identity was confirmed and verbal consent was obtained for this virtual visit.  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    Since 11/10/2022 appointment the patient unfortunately fell, fractured her left hip required percutaneous fixation of the left femoral neck on 1/28/2023 [facility], currently in rehab.  She had been at school in the bathroom when she got up and suddenly felt severe dizziness and lightheadedness, lost her balance and fell.  Specifically she denies loss of consciousness and recalls falling to the floor.  She was able to pick herself up and did not seek medical attention for at least 2 more days.  She remains on metoprolol ER 25 mg daily and Corlanor 7.5 mg twice daily (had been taking it only once a day at last visit) and maintains a high salt diet.  Still plans to have gastric sleeve procedure by John Ganser MD.    Has had genetic testing has returned with a profile suggesting hypermobility syndrome but no specific genetic defect to confirm EDS (Erler's Danlos syndrome)    Previously seen at [facility] in 3/2022 for chest pain symptoms, also seen at [facility] on 2/17/2022 for chest pain shortness of breath with negative chest CTA,  "normal EKG.  There has been mention that the patient had \"atrial fibrillation\" however there is been no documentation of that.      Past medical history  Her sister has EDS (Erler's Danlos syndrome) recently confirmed in [place], the patient has had chronic problems with polyarticular joint pain, hypermobility, easy bruisability, \"my tissues tear\", problems with \"weakened esophagus, brain fog, weak larynx\", currently with a right ankle problem that is \"going out\" requiring the use of a cane for stabilization.      Past Medical History:   Diagnosis Date    Abdominal pain     Anginal syndrome     random chest pain especially with tachycardia    Apnea, sleep     Arrhythmia     \"sinus tachycardia\", cariologist, Dr. Kumar; ablation 2/2016    Arthritis     osteo    ASTHMA     when around smoke    Back pain     Borderline personality disorder (HCC)     Breath shortness     with tachycardia    Bronchitis 02/08/2022    Last time was 12/21    Cardiac arrhythmia     Chickenpox     Chronic UTI 09/18/2010    Cough     Daytime sleepiness     Dental disorder 03/08/2021    infected tooth    Depression     Diabetes (HCC)     Diarrhea     incontinece    Disorder of thyroid     Hashimoto's    Fall     Fatigue     Frequent headaches     Gasping for breath     Gynecological disorder     PCOS    Headache(784.0)     Heart burn     Heart murmur     History of falling     Indigestion     Migraine     Mitochondrial disease (HCC)     Multiple personality disorder (HCC)     Nausea     Obesity     Other fatigue 06/29/2020    Pain 08/15/2012    back, 7/10    Painful joint     PCOS (polycystic ovarian syndrome)     Pneumonia 2012 12/2020    Psychosis (HCC)     Ringing in ears     Scoliosis     Shortness of breath     O2 as needed    Sinus tachycardia 10/31/2013    Sleep apnea     CPAP \"pulmonary doctor took me off mid year 2016\"    Snoring     Tonsillitis     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HCC)     2/8/22: Per pt: not anymore    Tuberculosis     Latent Tb at " age 6 y/o. Received treatment.    Urinary bladder disorder     Suprapubic cath. 2/8/22: Not anymore.     Urinary incontinence     Weakness     Wears glasses      Past Surgical History:   Procedure Laterality Date    PB PERCUT FIX PBOX/NECK FEMUR FX Left 1/28/2023    Procedure: FIXATION, HIP, USING CANNULATED SCREW;  Surgeon: Noman Abdul M.D.;  Location: [facility];  Service: Orthopedics    RI LAP,DIAGNOSTIC ABDOMEN  2/14/2022    Procedure: LAPAROSCOPY;  Surgeon: Seamus Pisano M.D.;  Location: SURGERY SAME DAY [facility];  Service: Gynecology    OVARIAN CYSTECTOMY Right 2/14/2022    Procedure: EXCISION, CYST, OVARY;  Surgeon: Seamus Pisano M.D.;  Location: SURGERY SAME DAY [facility];  Service: Gynecology    BOWEL STIMULATOR INSERTION  3/10/2021    Procedure: INSERTION, ELECTRODE LEADS AND PULSE GENERATOR, NEUROSTIMULATOR, SACRAL - REMOVAL OF INTERSTIM WITH REPLACEMENT OF SACRAL NEUROMODULATION DEVICE;  Surgeon: Joe Noyola M.D.;  Location: [facility];  Service: General    MUSCLE BIOPSY Right 1/26/2017    Procedure: MUSCLE BIOPSY - THIGH;  Surgeon: Isidro Vigil M.D.;  Location: [facility];  Service:     GASTROSCOPY WITH BALLOON DILATATION N/A 1/4/2017    Procedure: GASTROSCOPY WITH DILATATION;  Surgeon: Torres Vargas M.D.;  Location: [facility];  Service:     BOWEL STIMULATOR INSERTION  7/13/2016    Procedure: BOWEL STIMULATOR INSERTION FOR PERMANENT INTERSTIM SACRAL IMPLANT;  Surgeon: Joe Noyola M.D.;  Location: [facility];  Service:     RECOVERY  1/27/2016    Procedure: CATH LAB EP STUDY WITH SINUS NODE MODIFICATION ABHINAV;  Surgeon: Recoveryonly Surgery;  Location: SURGERY PRE-POST PROC UNIT [facility];  Service:     OTHER CARDIAC SURGERY  1/2016    cardiac ablation    NEURO DEST FACET L/S W/IG SNGL  4/21/2015    Performed by Reza Tabor at [facility]    LUMBAR FUSION ANTERIOR  8/21/2012    Performed by SUSIE SAWANT at [facility]    ALYSSA BY LAPAROSCOPY  8/29/2010    Performed by SHAYY JOHNS at [facility]    LAMINOTOMY      OTHER ABDOMINAL SURGERY      TONSILLECTOMY      tonsillectomy     Family History   Problem Relation Age of Onset    Hypertension Mother     Sleep Apnea Mother     Heart Disease Mother     Other Mother         hypothryod    Hypertension Maternal Uncle     Heart Disease Maternal Grandmother     Hypertension Maternal Grandmother     No Known Problems Sister     Other Sister         Narcolepsy;fibromyalsia;bone;nerve    Genitourinary () Problems Sister         endometriosis     Social History     Socioeconomic History    Marital status: Single     Spouse name: Not on file    Number of children: Not on file    Years of education: Not on file    Highest education level: Not on file   Occupational History    Not on file   Tobacco Use    Smoking status: Never    Smokeless tobacco: Never   Vaping Use    Vaping Use: Never used   Substance and Sexual Activity    Alcohol use: No     Alcohol/week: 0.0 oz    Drug use: Not Currently     Frequency: 7.0 times per week     Types: Marijuana    Sexual activity: Not Currently     Birth control/protection: Implant   Other Topics Concern    Not on file   Social History Narrative    ** Merged History Encounter **          Social Determinants of Health     Financial Resource Strain: Not on file   Food Insecurity: Not on file   Transportation Needs: No Transportation Needs    Lack of Transportation (Medical): No    Lack of Transportation (Non-Medical): No   Physical Activity: Not on file   Stress: Not on file   Social Connections: Not on file   Intimate Partner Violence: Not on file   Housing Stability: Not on file     Allergies   Allergen Reactions    Cefdinir Shortness of Breath and Itching     Tolerated 1/18/17  Tolerates ceftriaxone  Tolerated augmentin 8/2019     Depakote [Divalproex Sodium] Unspecified     Muscle spasms/muscle pain and weakness       "Doxycycline Anaphylaxis and Vomiting     Other reaction(s): pustules/blisters  Other reaction(s): stomach pain    Montelukast [Singulair] Unspecified     Cardiac effusion    Vancomycin Itching     Pt becomes flushed in face and chest.   RXN=7/10/16    Wound Dressing Adhesive Rash     By pt report-\"removes skin totally off\"    Amitriptyline Unspecified     Headaches      Aripiprazole [Abilify] Unspecified     Headaches/muscle twitching      Clindamycin Nausea         Other reaction(s): nausea stomach pain    Flomax [Tamsulosin Hydrochloride] Swelling    Levaquin Unspecified     Severe muscle cramps in legs  RXN=unknown  Other reaction(s): leg muscle cramps    Metformin Unspecified     Increased lactic acid     Other reaction(s): itching and rash/nausea vomiting    Tamsulosin Swelling     Swelling of legs    Tape Rash     Tears skin off  coban with Tegaderm tape ok intermittently  RXN=ongoing    Amoxicillin Rash    Depakote [Divalproex Sodium]     Erythromycin Rash     .  Other reaction(s): nausea stomach pain    Hydromorphone      Other reaction(s): vomiting    Ampicillin Rash     Pt reports that she received a rash     Ciprofloxacin Rash          Keflex Rash     Pt states she gets a rash with this medication  Tolerates ceftriaxone  Can take with Benadryl    Levofloxacin Unspecified     Leg muscle cramps    Metronidazole Rash     \"Vision problems\"  Other reaction(s): vision problems    Penicillins Hives     Can take with Benadryl    Sulfa Drugs Itching and Myalgia     Muscle pain and weakness  Other reaction(s): unknown    Valproic Acid Rash     .     Facility-Administered Encounter Medications as of 2/9/2023   Medication Dose Route Frequency Provider Last Rate Last Admin    sore throat spray (Chloraseptic) 1 Spray  1 Spray Mouth/Throat Q2HRS PRN Craig Simmons M.D.   1 Spray at 02/07/23 1639    guaiFENesin ER (MUCINEX) tablet 600 mg  600 mg Oral Q12HRS PRN Craig Simmons M.D.        " benzocaine-menthol (Cepacol) lozenge 1 Lozenge  1 Lozenge Mouth/Throat Q2HRS PRN Dangelo Shannon M.D.   1 Lozenge at 02/07/23 0750    diphenhydrAMINE (BENADRYL) tablet/capsule 50 mg  50 mg Oral HS PRN - MR X 1 Craig Simmons M.D.   50 mg at 02/08/23 2015    gabapentin (NEURONTIN) capsule 300 mg  300 mg Oral TID Craig Simmons M.D.   300 mg at 02/09/23 0833    vitamin D3 (cholecalciferol) tablet 2,000 Units  2,000 Units Oral DAILY Craig Simmons M.D.   2,000 Units at 02/09/23 0833    Respiratory Therapy Consult   Nebulization Continuous RT Craig Simmons M.D.        hydrALAZINE (APRESOLINE) tablet 25 mg  25 mg Oral Q8HRS PRN Craig Simmons M.D.        acetaminophen (Tylenol) tablet 650 mg  650 mg Oral Q4HRS PRN Craig Simmons M.D.   650 mg at 02/04/23 1458    senna-docusate (PERICOLACE or SENOKOT S) 8.6-50 MG per tablet 2 Tablet  2 Tablet Oral BID Craig Simmons M.D.   2 Tablet at 02/09/23 0832    And    polyethylene glycol/lytes (MIRALAX) PACKET 1 Packet  1 Packet Oral QDAY PRN Craig Simmons M.D.        And    magnesium hydroxide (MILK OF MAGNESIA) suspension 30 mL  30 mL Oral QDAY PRN Craig Simmons M.D.   30 mL at 02/04/23 0752    And    bisacodyl (DULCOLAX) suppository 10 mg  10 mg Rectal QDAY PRN Craig Simmons M.D.   10 mg at 02/04/23 1802    omeprazole (PRILOSEC) capsule 20 mg  20 mg Oral DAILY Craig Simmons M.D.   20 mg at 02/09/23 0832    mag hydrox-al hydrox-simeth (MAALOX PLUS ES or MYLANTA DS) suspension 20 mL  20 mL Oral Q2HRS PRN Craig Simmons M.D.        ondansetron (ZOFRAN ODT) dispertab 4 mg  4 mg Oral 4X/DAY PRN Craig Simmons M.D.   4 mg at 02/04/23 1132    Or    ondansetron (ZOFRAN) syringe/vial injection 4 mg  4 mg Intramuscular 4X/DAY PRN Criag Simmons M.D.        traZODone (DESYREL) tablet 50 mg  50 mg Oral QHS PRN Craig Simmons M.D.   50 mg  at 02/01/23 2201    sodium chloride (OCEAN) 0.65 % nasal spray 2 Spray  2 Spray Nasal PRN Craig Simmons M.D.        traMADol (Ultram) 50 MG tablet 50 mg  50 mg Oral Q4HRS PRN Craig Simmons M.D.        acetaminophen (Tylenol) tablet 650 mg  650 mg Oral Q6HRS PRN Craig Simmons M.D.        albuterol inhaler 2 Puff  2 Puff Inhalation Q6HRS PRN Craig Simmons M.D.   2 Puff at 02/05/23 1556    enoxaparin (Lovenox) inj 40 mg  40 mg Subcutaneous DAILY AT 1800 Craig Simmons M.D.   40 mg at 02/08/23 1738    ipratropium-albuterol (DUONEB) nebulizer solution  3 mL Nebulization Q6HRS PRN Craig Simmons M.D.        ivabradine (Corlanor) tablet 7.5 mg  7.5 mg Oral BID WITH MEALS Craig Simmons M.D.   7.5 mg at 02/08/23 1730    melatonin tablet 10.5 mg  10.5 mg Oral QHS Craig Simmons M.D.   10.5 mg at 02/08/23 2005    methocarbamol (ROBAXIN) tablet 500 mg  500 mg Oral TID Craig iSmmons M.D.   500 mg at 02/09/23 0833    metoprolol SR (TOPROL XL) tablet 25 mg  25 mg Oral QADANIE Simmons M.D.   25 mg at 02/08/23 0827    traZODone (DESYREL) tablet 100 mg  100 mg Oral QHS Craig Simmons M.D.   100 mg at 02/08/23 2005    ziprasidone (GEODON) capsule 40 mg  40 mg Oral QHS Craig Simmons M.D.   40 mg at 02/08/23 2006    oxyCODONE immediate release (ROXICODONE) tablet 10 mg  10 mg Oral Q3HRS PRN Craig Simmons M.D.   10 mg at 02/09/23 0831    HYDROmorphone (DILAUDID) tablet 2 mg  2 mg Oral Q3HRS PRN DANIE Gayle.D.   2 mg at 02/08/23 1130     Outpatient Encounter Medications as of 2/9/2023   Medication Sig Dispense Refill    oxycodone-acetaminophen (LYNOX)  MG per tablet Take 1 Tablet by mouth every four hours as needed for Severe Pain.      enoxaparin (LOVENOX) 40 MG/0.4ML Solution Prefilled Syringe inj Inject 40 mg under the skin every day at 6 PM. 27 Each     gabapentin (NEURONTIN)  100 MG Cap Take 1 Capsule by mouth 3 times a day. 90 Capsule     methocarbamol (ROBAXIN) 500 MG Tab Take 1 Tablet by mouth 3 times a day. 120 Tablet     acetaminophen/caffeine/butalbital 300-40-50 mg (FIORICET) -40 MG Cap capsule Take 1 Capsule by mouth every 6 hours as needed. Indications: Tension Headache      lactulose 20 GM/30ML Solution Take 40 mg by mouth 2 times a day.      diphenhydrAMINE (BENADRYL) 25 MG capsule Take 50 mg by mouth at bedtime.      fluconazole (DIFLUCAN) 150 MG tablet Take 150 mg by mouth every Wednesday.      Melatonin 10 MG Tab Take 10 mg by mouth at bedtime.      polyethylene glycol 3350 (MIRALAX) 17 GM/SCOOP Powder Take 17 g by mouth 1 time a day as needed.      SUMAtriptan (IMITREX) 50 MG Tab Take 50 mg by mouth one time as needed.      ipratropium-albuterol (DUONEB) 0.5-2.5 (3) MG/3ML nebulizer solution Take 3 mL by nebulization every 6 hours as needed for Shortness of Breath.      metoprolol SR (TOPROL XL) 25 MG TABLET SR 24 HR Take 25 mg by mouth every morning.      ivabradine (CORLANOR) 7.5 MG Tab tablet Take 1 Tablet by mouth 2 times a day with meals. 180 Tablet 3    etonogestrel (NEXPLANON) 68 MG Implant implant Inject 1 Each under the skin see administration instructions. Pt reports she is not sure when she had this medications injected last, pt reports she still has it in her arm  Every 3 years to change      docusate sodium (COLACE) 250 MG capsule Take 250 mg by mouth 2 times a day.      ziprasidone (GEODON) 40 MG Cap Take 1 Capsule by mouth at bedtime.      traZODone (DESYREL) 100 MG Tab Take 100 mg by mouth at bedtime.      albuterol 108 (90 Base) MCG/ACT Aero Soln inhalation aerosol Inhale 2 Puffs every 6 hours as needed for Shortness of Breath. 8.5 g 6    HYDROcodone-acetaminophen (NORCO) 5-325 MG Tab per tablet Take 1 Tablet by mouth every 6 hours as needed. (Patient not taking: Reported on 2/9/2023)       Review of Systems   Respiratory:  Negative for cough and  "shortness of breath.    Cardiovascular:  Negative for chest pain and palpitations.   Musculoskeletal:  Positive for joint pain. Negative for myalgias.   Neurological:  Negative for dizziness and loss of consciousness.            Objective     BP 96/59 (BP Location: Right arm, Patient Position: Sitting, BP Cuff Size: Adult)   Pulse 66   Ht 1.626 m (5' 4\")   SpO2 95%   BMI 40.49 kg/m²     Physical Exam  Constitutional:       General: She is in acute distress.      Appearance: She is well-developed.      Comments: Ambulating with cane assistance  Dyspneic  Diaphoretic   Eyes:      Conjunctiva/sclera: Conjunctivae normal.      Pupils: Pupils are equal, round, and reactive to light.   Neck:      Vascular: No JVD.   Cardiovascular:      Rate and Rhythm: Regular rhythm. Tachycardia present.      Heart sounds: Normal heart sounds.   Pulmonary:      Effort: Pulmonary effort is normal. No accessory muscle usage or respiratory distress.      Breath sounds: Normal breath sounds. No wheezing or rales.   Musculoskeletal:      Cervical back: Normal range of motion and neck supple.      Right lower leg: No edema.      Left lower leg: No edema.   Skin:     General: Skin is warm and dry.      Findings: No rash.      Nails: There is no clubbing.      Comments: Sweaty   Neurological:      Mental Status: She is alert and oriented to person, place, and time.   Psychiatric:         Behavior: Behavior normal.             MPI 4/6/2017  Baseline ECG:NSR, diffuse T wave flattening throughout  Stress ECG: No ischemic T wave changes with peak stress  Impression: Normal stress ECG, nuclear images pending   No myocardial infarct or inducible ischemia.   Normal LEFT ventricular function.    03/01/2017 ECHOCARDIOGRAM  Left ventricular ejection fraction 60%.  No valvular disease.     CHEST CTA 2/17/2022  1.  No evidence of pulmonary embolism.  2.  Trace bilateral pleural effusions.  3.  Clear lungs without consolidation.  4.  Ascending aorta " normal caliber no dissection.    Cardiac event recorder 8/31/2020  ZioPatch Summary:   1. Sinus rhythm with appropriate heart rate range. Average 73, range 52 to 117 bpm.   2. Normal sinus node and AV node conduction normal. No pauses.   3. Rare PACs.   4. Rare PVCs.   5. No sustained rhythm changes. No atrial fibrillation/flutter.   6. 5 patient triggers, symptoms reported include fluttering and racing, shaky during sinus, 73 to 90 bpm..   Conclusion: Normal monitor.  Sinus rhythm with rare PVCs and PACs.  Symptoms during sinus rhythm.   Lexy Solomon MD [facility]      08/13/2018 EKG: Normal sinus rhythm, rate 83.  Reviewed by myself.     EKG 4/1/2022 sinus rhythm, rate 80, personally reviewed.    EKG 10/11/2022 sinus tachycardia, rate 109, personally interpreted  Assessment & Plan     1. Inappropriate sinus tachycardia        2. Palpitations        3. Hypermobility syndrome            Medical Decision Making: Today's Assessment/Status/Plan:      Assessment  Inappropriate sinus tachycardia.?  POTS  Ablation for IST, 2016.  Thyroid disorder, on chronic supplementation.  Sleep apnea on CPAP.  Morbid obesity.  Hypermobility syndrome.  GLF, left hip fracture requiring percutaneous fixation      Recommendation Discussion  Complete rehab post left hip surgery  Continue Corlanor, metoprolol and high salt diet  May need additional monitoring  From a cardiac standpoint cleared for gastric sleeve procedure  Discussed the importance and the need for avoidance maneuvers in the event that she has positional lightheadedness or dizziness i.e. probably sitting back down or lying down until her symptoms resolve.  RTC 6 to 8 weeks

## 2023-02-09 NOTE — FLOWSHEET NOTE
02/09/23 1156   Events/Summary/Plan   Events/Summary/Plan o2 spot check   Vital Signs   Pulse 72   Respiration 18   Pulse Oximetry 96 %   $ Pulse Oximetry (Spot Check) Yes   Respiratory Assessment   Level of Consciousness Alert   Chest Exam   Work Of Breathing / Effort Within Normal Limits   Breath Sounds   RUL Breath Sounds Clear   RML Breath Sounds Clear   RLL Breath Sounds Diminished   MARY Breath Sounds Clear   LLL Breath Sounds Diminished   Oxygen   O2 Delivery Device None - Room Air

## 2023-02-09 NOTE — CARE PLAN
Problem: PT-Long Term Goals  Goal: LTG-By discharge, patient will ambulate 150 ft x2 with FWW vs LRAD and SBA.  Outcome: Met  Goal: LTG-By discharge, patient will transfer one surface to another with FWW vs LRAD and SPV.  Outcome: Met  Goal: LTG-By discharge, patient will ambulate up/down flight of stairs with SBA and RHR.  Outcome: Met  Goal: LTG-By discharge, patient will transfer in/out of a car with FWW vs LRAD and set up assist.  Outcome: Met

## 2023-02-09 NOTE — CARE PLAN
Problem: OT Long Term Goals  Goal: LTG-By discharge, patient will complete basic self care tasks with supervision.  Outcome: Met     Problem: OT Long Term Goals  Goal: LTG-By discharge, patient will perform bathroom transfers with supervision.  Outcome: Met     Problem: OT Long Term Goals  Goal: LTG-By discharge, patient will complete basic home management with supervision.  Outcome: Met

## 2023-02-09 NOTE — DISCHARGE SUMMARY
Physical Medicine & Rehabilitation Discharge Summary    Admission Date: 1/31/2023    Discharge Date: 2/10/2023     Attending Provider: Craig Simmons MD/PhD    Admission Diagnosis:   Active Hospital Problems    Diagnosis     *Hip fracture, left, closed, initial encounter (HCC)     Thrombocytopenia (HCC)     S/p left hip fracture     Postural orthostatic tachycardia syndrome     Moderate asthma     IBS (irritable bowel syndrome)     Chest pain     Schizoaffective disorder, depressive type (HCC)     TONYA (obstructive sleep apnea)     Vitamin D deficiency     Morbidly obese (HCC)        Discharge Diagnosis:  Active Hospital Problems    Diagnosis     *Hip fracture, left, closed, initial encounter (HCC)     Thrombocytopenia (HCC)     S/p left hip fracture     Postural orthostatic tachycardia syndrome     Moderate asthma     IBS (irritable bowel syndrome)     Chest pain     Schizoaffective disorder, depressive type (HCC)     TONYA (obstructive sleep apnea)     Vitamin D deficiency     Morbidly obese ([facility])        HPI per Admission History & Physical:  The patient is a 32 y.o. right hand dominant female with a past medical history of asthma, Hashimoto's, sleep apnea, type 2 diabetes, pots syndrome and Annetta-Danlos syndrome;  who presented on 1/27/2023  4:51 PM with left hip pain after ground-level fall secondary to postural orthostatic tachycardia syndrome. Patient initially seen at outside ED but no noted fracture. She was then assessed at [facility] and found to have left femoral neck fracture. Patient was evaluated by orthopedic surgeon Dr. Noman Abdul MD who found left nondisplaced femoral neck fracture, and took patient to the OR on 1/28 for percutaneous fixation of left hip fracture. Post-operative course complicated by hyperglycemia, back pain, and low SBP.     Patient was admitted to [facility] on 1/31/2023.     Hospital Course by Problem List:  Fall with left hip fracture - Patient  s/p fall with hip fracture s/p IMN with Dr. Abdul on 1/28/23. Patient WBAT.  -PT and OT for mobility and ADLs. Per guidelines, 15 hours per week between PT, OT and SLP.  -Follow-up Dr. Abdul  -Worsened leg pain on 2/8/23 with feeling of 10/10 with instability. Check XR hip, high risk for injury as well as dislocation. If ongoing pain consider US to rule out hematoma, will check AM labs     Hyperglycemia - Not on any medication on transfer. Will check A1c - 5.1. No longer diabetic     Asthma - Patient on PRN Duoneb and Albuterol inhaler      POTS - Patient on Metoprolol 25 mg XL and Corlanor 7.5 mg BID. Cardiac recommending high salt diet, add salt tablets     New chest pain - Rapid response on 2/3/23 for chest pain. EKG to bedside with sinus rhythm. Stat Troponin, CXR, CBC, CMP. Patient's case was discussed face to face with Hospitalist on [facility] floor. Will repeat Troponin in 6 hours. Patient at high risk for further injury due to history of POTS, obesity and antipsychotic usage which can affected QTc.   -Negative on work-up. Resolved on 2/3/23 repeat troponin negative.     Neuropathy - Patient on Gabapentin 100 mg TID -> 300 mg TID. High risk medication will need to monitor Cr. Will recheck - stable     Thrombocytopenia - Check AM CBC - 154, stable. Continue to monitor     Morbid obesity due to excess calories - BMI of 39.7 on admission, meets medical criteria. Dietitian to consult     Schizophrenia - Patient on Geodon 40 mg QHS and Trazodone      Pain - APAP/Gabapentin/Oxycodone and Robaxin TID  -Change PRN to Oxycodone 10 mg for moderate and Dilaudid PO 2 mg q3h. As having severe pain and has constipation with oxycodone. Dilaudid is high risk medication discussed about previous confusion/agitation and will need to monitor in setting of psych history.   I discussed the risks and benefits of using opiate medications for pain control.  I discussed the risk of addiction, potential for overdose, and respiratory  depression (and the potential need for opiate antagonist therapy if this occurs).  I encouraged the patient to take this medication sparingly with the expressed goal of weaning off the medication as soon as is clinically appropriate.  I informed the patient that we are only able to provide up to a 14 day supply of these medications at discharge and that they will be responsible for requesting any refills needed from their primary care provider or their surgeon.  We discussed the need to safely secure these medications to prevent theft, inadvertent ingestion, or misuse.  Any unused medication should be immediately disposed of through a sanctioned medication disposal program.  We discussed adjunctive pain medications and conservative therapies at length.I answered the patient's questions regarding this treatment, and the patient indicated understanding and willingness to proceed.     Sore throat - history of strep throat. Will check Strep PCR. Low threshold for check COVID. No other symptoms.   -Strep negative. Start chloraseptic spray and mucinex. Recheck labs     Skin - Patient at risk for skin breakdown due to debility in areas including sacrum, achilles, elbows and head in addition to other sites. Nursing to assess skin daily.      Vitamin D deficiency - acute on chronic. Start 2000 U      GI Ppx - Patient on Prilosec for GERD prophylaxis. Patient on Senna-docusate for constipation prophylaxis.      DVT Ppx - Patient Lovenox on transfer.  -Worsening left leg pain. Patient with decreased sensation and swelling to lower LE. Discussed stat DVT doppler. Did have SOB. Will check CXR. RT to give albuterol, if ongoing will get CTA PE. Patient at very high risk for DVT/PE due to fracture and obesity.     Functional Status at Discharge  Eating:  Independent  Eating Description:     Grooming:  Modified Independent  Grooming Description:  Standing at sink  Bathing:  Modified Independent  Bathing Description:  Grab bar, Tub  bench, Hand held shower  Upper Body Dressing:  Modified Independent  Upper Body Dressing Description:  Assist device equipment  Lower Body Dressing:  Modified Independent  Lower Body Dressing Description:  Sock aid, Dressing stick, Assistive devices     Walk:  Supervised (Pt ambulates additional 3x 80ft with RUE SPC and SBA. Pt with good use of cane, no significant deviations noted. Pt ambulates multiple bouts with no UE support in // bars with SBA, mirror used for visual feedback per request. Cues for push off provided.)  Distance Walked:  150  Number of Times Distance Was Traveled:  4  Assistive Device:  4 Wheel Walker  Gait Deviation:  Decreased Toe Off  Wheelchair:  Supervised  Distance Propelled:  140   Wheelchair Description:  Supervision for safety  Stairs Supervised  Stairs Description Extra time (R ascending HR)  Discharge Location: Home  Patient Discharging with Assist of: Family  Level of Supervision Required Upon Discharge: No Supervision  Recommended Equipment for Discharge: 4-Wheeled Walker  Recommeded Services Upon Discharge: Outpatient Physical Therapy  Long Term Goals Met: 4  Long Term Goals Not Met: 0  Criteria for Termination of Services: Maximum Function Achieved for Inpatient Rehabilitation  Comprehension:     Comprehension Description:     Expression:     Expression Description:     Social Interaction:     Social Interaction Description:     Problem Solving:     Problem Solving Description:     Memory:     Memory Description:          Craig RODRIGUEZ M.D., personally performed a complete drug regimen review and no potential clinically significant medication issues were identified.   Discharge Medication:     Medication List        START taking these medications        Instructions   HYDROmorphone 2 MG Tabs  Commonly known as: DILAUDID   Take 1 tablet by mouth every 12 hours as needed for Severe Pain for up to 14 days.  Dose: 2 mg     oxyCODONE immediate release 10 MG immediate  release tablet  Commonly known as: ROXICODONE   Take 1 tablet by mouth every 6 hours as needed for Moderate Pain for up to 14 days.  Dose: 10 mg     sodium chloride 1 GM Tabs  Commonly known as: SALT   Take 1 Tablet by mouth 3 times a day with meals.  Dose: 1 g            CHANGE how you take these medications        Instructions   gabapentin 300 MG Caps  What changed:   medication strength  how much to take  Commonly known as: NEURONTIN   Take 1 Capsule by mouth 3 times a day.  Dose: 300 mg            CONTINUE taking these medications        Instructions   albuterol 108 (90 Base) MCG/ACT Aers inhalation aerosol   Inhale 2 Puffs every 6 hours as needed for Shortness of Breath.  Dose: 2 Puff     Corlanor 7.5 MG Tabs tablet  Generic drug: ivabradine   Take 1 Tablet by mouth 2 times a day with meals.  Dose: 7.5 mg     diphenhydrAMINE 25 MG capsule  Commonly known as: BENADRYL   Take 50 mg by mouth at bedtime.  Dose: 50 mg     etonogestrel 68 MG Impl implant  Commonly known as: NEXPLANON   Inject 1 Each under the skin see administration instructions. Pt reports she is not sure when she had this medications injected last, pt reports she still has it in her arm  Every 3 years to change  Dose: 68 mg     Melatonin 10 MG Tabs   Take 10 mg by mouth at bedtime.  Dose: 10 mg     methocarbamol 500 MG Tabs  Commonly known as: ROBAXIN   Take 1 tablet by mouth 3 times a day.  Dose: 500 mg     metoprolol SR 25 MG Tb24  Commonly known as: TOPROL XL   Take 1 Tablet by mouth every morning.  Dose: 25 mg     traZODone 100 MG Tabs  Commonly known as: DESYREL   Take 1 tablet by mouth at bedtime.  Dose: 100 mg     ziprasidone 40 MG Caps  Commonly known as: GEODON   Take 1 capsule by mouth at bedtime.  Dose: 40 mg            STOP taking these medications      acetaminophen/caffeine/butalbital 300-40-50 mg -40 MG Caps capsule  Commonly known as: FIORICET     docusate sodium 250 MG capsule  Commonly known as: COLACE     enoxaparin 40  MG/0.4ML Sosy inj  Commonly known as: Lovenox     fluconazole 150 MG tablet  Commonly known as: DIFLUCAN     HYDROcodone-acetaminophen 5-325 MG Tabs per tablet  Commonly known as: NORCO     ipratropium-albuterol 0.5-2.5 (3) MG/3ML nebulizer solution  Commonly known as: DUONEB     lactulose 20 GM/30ML Soln     oxycodone-acetaminophen  MG per tablet  Commonly known as: LYNOX     polyethylene glycol 3350 17 GM/SCOOP Powd  Commonly known as: MIRALAX     SUMAtriptan 50 MG Tabs  Commonly known as: IMITREX              Discharge Diet:  Current Diet Order   Procedures    Diet Order Diet: Regular       Discharge Activity:  As tolerated     Disposition:  Patient to discharge home with family support and community resources.    Equipment:  FWW    Follow-up & Discharge Instructions:  Follow up with your primary care provider (PCP) within 7-10 days of discharge to review your medications and take over your care.     If you develop chest pain, fever, chills, change in neurologic function (weakness, sensation changes, vision changes), or other concerning sxs, seek immediate medical attention or call 911.      No future appointments.    Condition on Discharge:  Good    More than 31 minutes was spent on discharging this patient, including face-to-face time, prescription management, and the dictation of this note.    Craig Simmons M.D.    Date of Service: 2/10/2023

## 2023-02-10 VITALS
TEMPERATURE: 98.9 F | OXYGEN SATURATION: 93 % | HEIGHT: 64 IN | BODY MASS INDEX: 40.27 KG/M2 | DIASTOLIC BLOOD PRESSURE: 50 MMHG | WEIGHT: 235.89 LBS | SYSTOLIC BLOOD PRESSURE: 101 MMHG | HEART RATE: 74 BPM | RESPIRATION RATE: 18 BRPM

## 2023-02-10 PROCEDURE — 99239 HOSP IP/OBS DSCHRG MGMT >30: CPT | Performed by: PHYSICAL MEDICINE & REHABILITATION

## 2023-02-10 PROCEDURE — A9270 NON-COVERED ITEM OR SERVICE: HCPCS | Performed by: PHYSICAL MEDICINE & REHABILITATION

## 2023-02-10 PROCEDURE — 700102 HCHG RX REV CODE 250 W/ 637 OVERRIDE(OP): Performed by: PHYSICAL MEDICINE & REHABILITATION

## 2023-02-10 RX ADMIN — GABAPENTIN 300 MG: 300 CAPSULE ORAL at 07:39

## 2023-02-10 RX ADMIN — METHOCARBAMOL 500 MG: 500 TABLET ORAL at 07:39

## 2023-02-10 RX ADMIN — Medication 2000 UNITS: at 07:38

## 2023-02-10 RX ADMIN — HYDROMORPHONE HYDROCHLORIDE 2 MG: 2 TABLET ORAL at 13:24

## 2023-02-10 RX ADMIN — OXYCODONE HYDROCHLORIDE 10 MG: 10 TABLET ORAL at 07:35

## 2023-02-10 RX ADMIN — SODIUM CHLORIDE 1 G: 1 TABLET ORAL at 07:39

## 2023-02-10 RX ADMIN — OMEPRAZOLE 20 MG: 20 CAPSULE, DELAYED RELEASE ORAL at 07:39

## 2023-02-10 RX ADMIN — SODIUM CHLORIDE 1 G: 1 TABLET ORAL at 11:03

## 2023-02-10 ASSESSMENT — PAIN SCALES - PAIN ASSESSMENT IN ADVANCED DEMENTIA (PAINAD): BODYLANGUAGE: RELAXED

## 2023-02-10 NOTE — CARE PLAN
The patient is Stable - Low risk of patient condition declining or worsening    Shift Goals  Clinical Goals: safety  Patient Goals: d/c home    Problem: Knowledge Deficit - Standard  Goal: Patient and family/care givers will demonstrate understanding of plan of care, disease process/condition, diagnostic tests and medications  Outcome: Met     Problem: Skin Integrity  Goal: Skin integrity is maintained or improved  Outcome: Met     Problem: Pain - Standard  Goal: Alleviation of pain or a reduction in pain to the patient’s comfort goal  Outcome: Met     Problem: Fall Risk - Rehab  Goal: Patient will remain free from falls  Outcome: Met     Problem: Discharge Barriers/Planning  Goal: Patient's continuum of care needs are met  Outcome: Met     Problem: Psychosocial  Goal: Patient's level of anxiety will decrease  Outcome: Met     Problem: Psychosocial  Goal: Patient's ability to verbalize feelings about condition will improve  Outcome: Met     Problem: Psychosocial  Goal: Patient's ability to re-evaluate and adapt role responsibilities will improve  Outcome: Met     Problem: Psychosocial  Goal: Patient and family will demonstrate ability to cope with life altering diagnosis and/or procedure  Outcome: Met     Problem: Psychosocial  Goal: Spiritual and cultural needs incorporated into hospitalization  Outcome: Met     Problem: Bladder / Voiding  Goal: Patient will establish and maintain regular urinary output  Outcome: Met     Problem: Bladder / Voiding  Goal: Patient will establish and maintain bladder regimen  Outcome: Met     Problem: Bowel Elimination  Goal: Patient will participate in bowel management program  Outcome: Met     Problem: Skin Integrity  Goal: Patient's skin integrity will be maintained or improve  Outcome: Met     Problem: Mobility  Goal: Patient's capacity to carry out activities will improve  Outcome: Met     Problem: Infection  Goal: Patient will remain free from infection  Outcome: Met      Problem: VTE Prevention  Goal: Patient will remain free from venous thromboembolism (VTE)  Outcome: Met

## 2023-02-10 NOTE — CARE PLAN
Problem: Respiratory  Goal: Patient will understand use and administration of respiratory medications to improve respiratory function  Outcome: Met   The patient is Stable - Low risk of patient condition declining or worsening

## 2023-02-10 NOTE — CARE PLAN
The patient is Stable - Low risk of patient condition declining or worsening    Shift Goals  Clinical Goals: Safety  Patient Goals: Sleep      Problem: Skin Integrity  Goal: Skin integrity is maintained or improved  Outcome: Progressing  Note:   Shay Score: 20    Patient's skin remains intact and free from new or accidental injury this shift; no s/s of infection. RN wound protocol checked. Encouraged hydration and educated about the importance of nutrition to keep skin integrity. Will continue to monitor.       Problem: Pain - Standard  Goal: Alleviation of pain or a reduction in pain to the patient’s comfort goal  Outcome: Progressing  Flowsheets  Taken 2/10/2023 0305  OB Pain Intervention:   Medication - See MAR   Rest   Distraction  Pain Rating Scale (NPRS): 8  Taken 2/9/2023 2113  Non Verbal Scale: Sleeping  Gutierrez-Mccain Scale: 0

## 2023-02-10 NOTE — DISCHARGE PLANNING
Case management  Reviewed copy of IMM and answered all questions.    Patient and CM discussed making a follow up appointment with Dr. Abdul. CM left message with medical assistant at the [facility]. Office to call and set up appointment with patient.     Saw patient in lobby prior to discharge. No needs at this time.    Dc date /disposition: Home with outpatient therapy.

## 2023-02-10 NOTE — PROGRESS NOTES
Patient discharged to home per order. All discharge instructions reviewed with patient and her   Grandma; they verbalize understanding; signed copies of discharge paperwork placed in chart.  Patient has all belongings; signed form in chart. Patient ambulatory and left facility at approx 1400 accompanied by family and rehab staff.  Have enjoyed working with this pleasant patient.

## 2023-03-18 ENCOUNTER — OFFICE VISIT (OUTPATIENT)
Dept: URGENT CARE | Facility: CLINIC | Age: 33
End: 2023-03-18
Payer: MEDICARE

## 2023-03-18 VITALS
RESPIRATION RATE: 20 BRPM | SYSTOLIC BLOOD PRESSURE: 110 MMHG | DIASTOLIC BLOOD PRESSURE: 60 MMHG | HEIGHT: 64 IN | TEMPERATURE: 98.3 F | OXYGEN SATURATION: 98 % | HEART RATE: 68 BPM | BODY MASS INDEX: 39.03 KG/M2 | WEIGHT: 228.6 LBS

## 2023-03-18 DIAGNOSIS — L08.9 SKIN INFECTION: ICD-10-CM

## 2023-03-18 PROCEDURE — 99214 OFFICE O/P EST MOD 30 MIN: CPT | Performed by: FAMILY MEDICINE

## 2023-03-18 RX ORDER — CLINDAMYCIN HYDROCHLORIDE 300 MG/1
300 CAPSULE ORAL 3 TIMES DAILY
Qty: 15 CAPSULE | Refills: 0 | Status: SHIPPED | OUTPATIENT
Start: 2023-03-18 | End: 2023-03-23

## 2023-03-18 ASSESSMENT — FIBROSIS 4 INDEX: FIB4 SCORE: 0.64

## 2023-03-18 NOTE — PROGRESS NOTES
"Subjective     Kristin Balderrama is a 32 y.o. female who presents with Abscess (X1day Left inner thigh possible abscess/red/hot bleeding/)      - This is a pleasant and nontoxic appearing 32 y.o. female who has come to the walk-in clinic today for:    #1) infection on pubic area since last week, got worse yesterday and popped and drained pus. No NVFC. Has had these before. Says Clindamycin worked in past (upsets stomach but eats witth it and helps).       ALLERGIES:  Cefdinir, Depakote [divalproex sodium], Doxycycline, Montelukast [singulair], Vancomycin, Wound dressing adhesive, Amitriptyline, Aripiprazole [abilify], Clindamycin, Flomax [tamsulosin hydrochloride], Levaquin, Metformin, Tamsulosin, Tape, Amoxicillin, Depakote [divalproex sodium], Erythromycin, Hydromorphone, Ampicillin, Ciprofloxacin, Keflex, Levofloxacin, Metronidazole, Penicillins, Sulfa drugs, and Valproic acid     PMH:  Past Medical History:   Diagnosis Date    Abdominal pain     Anginal syndrome     random chest pain especially with tachycardia    Apnea, sleep     Arrhythmia     \"sinus tachycardia\", cariologist, Dr. Kumar; ablation 2/2016    Arthritis     osteo    ASTHMA     when around smoke    Back pain     Borderline personality disorder (HCC)     Breath shortness     with tachycardia    Bronchitis 02/08/2022    Last time was 12/21    Cardiac arrhythmia     Chickenpox     Chronic UTI 09/18/2010    Cough     Daytime sleepiness     Dental disorder 03/08/2021    infected tooth    Depression     Diabetes (HCC)     Diarrhea     incontinece    Disorder of thyroid     Hashimoto's    Fall     Fatigue     Frequent headaches     Gasping for breath     Gynecological disorder     PCOS    Headache(784.0)     Heart burn     Heart murmur     History of falling     Indigestion     Migraine     Mitochondrial disease (HCC)     Multiple personality disorder (HCC)     Nausea     Obesity     Other fatigue 06/29/2020    Pain 08/15/2012    back, 7/10    Painful " "joint     PCOS (polycystic ovarian syndrome)     Pneumonia 2012 12/2020    Psychosis (HCC)     Ringing in ears     Scoliosis     Shortness of breath     O2 as needed    Sinus tachycardia 10/31/2013    Sleep apnea     CPAP \"pulmonary doctor took me off mid year 2016\"    Snoring     Tonsillitis     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HCC)     2/8/22: Per pt: not anymore    Tuberculosis     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    Urinary bladder disorder     Suprapubic cath. 2/8/22: Not anymore.     Urinary incontinence     Weakness     Wears glasses         PSH:  Past Surgical History:   Procedure Laterality Date    PB PERCUT FIX PBOX/NECK FEMUR FX Left 1/28/2023    Procedure: FIXATION, HIP, USING CANNULATED SCREW;  Surgeon: Noman Abdul M.D.;  Location: [facility];  Service: Orthopedics    NY LAP,DIAGNOSTIC ABDOMEN  2/14/2022    Procedure: LAPAROSCOPY;  Surgeon: Seamus Pisano M.D.;  Location: SURGERY SAME DAY [facility];  Service: Gynecology    OVARIAN CYSTECTOMY Right 2/14/2022    Procedure: EXCISION, CYST, OVARY;  Surgeon: Seamus Pisano M.D.;  Location: SURGERY SAME DAY [facility];  Service: Gynecology    BOWEL STIMULATOR INSERTION  3/10/2021    Procedure: INSERTION, ELECTRODE LEADS AND PULSE GENERATOR, NEUROSTIMULATOR, SACRAL - REMOVAL OF INTERSTIM WITH REPLACEMENT OF SACRAL NEUROMODULATION DEVICE;  Surgeon: Joe Noyola M.D.;  Location: [facility];  Service: General    MUSCLE BIOPSY Right 1/26/2017    Procedure: MUSCLE BIOPSY - THIGH;  Surgeon: Isidro Vigil M.D.;  Location: [facility];  Service:     GASTROSCOPY WITH BALLOON DILATATION N/A 1/4/2017    Procedure: GASTROSCOPY WITH DILATATION;  Surgeon: Torres Vargas M.D.;  Location: [facility];  Service:     BOWEL STIMULATOR INSERTION  7/13/2016    Procedure: BOWEL STIMULATOR INSERTION FOR PERMANENT INTERSTIM SACRAL IMPLANT;  Surgeon: Joe Noyola M.D.;  Location: [facility];  Service:     RECOVERY  " 1/27/2016    Procedure: CATH LAB EP STUDY WITH SINUS NODE MODIFICATION ABHINAV;  Surgeon: [facility];  Location: SURGERY PRE-POST PROC UNIT [facility];  Service:     OTHER CARDIAC SURGERY  1/2016    cardiac ablation    NEURO DEST FACET L/S W/IG SNGL  4/21/2015    Performed by Reza Tabor at SURGERY [facility]    LUMBAR FUSION ANTERIOR  8/21/2012    Performed by SUSIE SAWANT at SURGERY [facility] ORS    ALYSSA BY LAPAROSCOPY  8/29/2010    Performed by SHAYY JOHNS at SURGERY [facility] ORS    LAMINOTOMY      OTHER ABDOMINAL SURGERY      TONSILLECTOMY      tonsillectomy       MEDS:    Current Outpatient Medications:     clindamycin (CLEOCIN) 300 MG Cap, Take 1 Capsule by mouth 3 times a day for 5 days., Disp: 15 Capsule, Rfl: 0    gabapentin (NEURONTIN) 300 MG Cap, Take 1 Capsule by mouth 3 times a day., Disp: 30 Capsule, Rfl: 2    ivabradine (CORLANOR) 7.5 MG Tab tablet, Take 1 Tablet by mouth 2 times a day with meals., Disp: 60 Tablet, Rfl: 2    methocarbamol (ROBAXIN) 500 MG Tab, Take 1 tablet by mouth 3 times a day., Disp: 90 Tablet, Rfl: 0    metoprolol SR (TOPROL XL) 25 MG TABLET SR 24 HR, Take 1 Tablet by mouth every morning., Disp: 30 Tablet, Rfl: 2    sodium chloride (SALT) 1 GM Tab, Take 1 Tablet by mouth 3 times a day with meals., Disp: 90 Tablet, Rfl: 2    traZODone (DESYREL) 100 MG Tab, Take 1 tablet by mouth at bedtime., Disp: 30 Tablet, Rfl: 2    ziprasidone (GEODON) 40 MG Cap, Take 1 capsule by mouth at bedtime., Disp: 30 Capsule, Rfl: 2    diphenhydrAMINE (BENADRYL) 25 MG capsule, Take 50 mg by mouth at bedtime., Disp: , Rfl:     Melatonin 10 MG Tab, Take 10 mg by mouth at bedtime., Disp: , Rfl:     etonogestrel (NEXPLANON) 68 MG Implant implant, Inject 1 Each under the skin see administration instructions. Pt reports she is not sure when she had this medications injected last, pt reports she still has it in her arm Every 3 years to change, Disp: , Rfl:     albuterol 108 (90 Base)  "MCG/ACT Aero Soln inhalation aerosol, Inhale 2 Puffs every 6 hours as needed for Shortness of Breath., Disp: 8.5 g, Rfl: 6    ** I have documented what I find to be significant in regards to past medical, social, family and surgical history  in my HPI or under PMH/PSH/FH review section, otherwise it is noncontributory **         HPI    Review of Systems   Skin:         ? Thigh abscess    All other systems reviewed and are negative.           Objective     /60 (BP Location: Right arm, Patient Position: Sitting)   Pulse 68   Temp 36.8 °C (98.3 °F) (Temporal)   Resp 20   Ht 1.626 m (5' 4\")   Wt 104 kg (228 lb 9.6 oz)   SpO2 98%   BMI 39.24 kg/m²      Physical Exam  Vitals and nursing note reviewed.   Constitutional:       General: She is not in acute distress.     Appearance: Normal appearance. She is well-developed.   HENT:      Head: Normocephalic.   Pulmonary:      Effort: Pulmonary effort is normal. No respiratory distress.   Skin:            Comments: Lt side pubic area w/ ~2x2cm area of erythema a little edema, no fluctuance and central opening that has dried drained pus.    Neurological:      Mental Status: She is alert.      Motor: No abnormal muscle tone.   Psychiatric:         Mood and Affect: Mood normal.         Behavior: Behavior normal.                           Assessment & Plan     1. Skin infection  clindamycin (CLEOCIN) 300 MG Cap          - Dx, plan & d/c instructions discussed   - Rest, stay hydrated  - warm compresses 10min 6-8x/day x 2 days  - 2 day recheck as needed  - E.R. precautions discussed     Follow up with your regular primary care providers office in 2 days for a recheck on today's visit. ER if not improving in 2-3 days or if feeling/getting worse.    Patient left in stable condition     Pertinent prior lab work and/or imaging studies in Epic have been reviewed by me today on day of this visit.      Pertinent prior office visit notes in [facility] have been reviewed by me today on " day of this visit.

## 2023-04-09 ENCOUNTER — PATIENT MESSAGE (OUTPATIENT)
Dept: CARDIOLOGY | Facility: MEDICAL CENTER | Age: 33
End: 2023-04-09
Payer: MEDICARE

## 2023-04-10 ENCOUNTER — HOSPITAL ENCOUNTER (EMERGENCY)
Facility: MEDICAL CENTER | Age: 33
End: 2023-04-10
Attending: EMERGENCY MEDICINE
Payer: MEDICARE

## 2023-04-10 ENCOUNTER — APPOINTMENT (OUTPATIENT)
Dept: RADIOLOGY | Facility: MEDICAL CENTER | Age: 33
End: 2023-04-10
Attending: EMERGENCY MEDICINE
Payer: MEDICARE

## 2023-04-10 VITALS
HEIGHT: 64 IN | DIASTOLIC BLOOD PRESSURE: 79 MMHG | BODY MASS INDEX: 39.22 KG/M2 | TEMPERATURE: 98.7 F | RESPIRATION RATE: 20 BRPM | WEIGHT: 229.72 LBS | OXYGEN SATURATION: 98 % | SYSTOLIC BLOOD PRESSURE: 133 MMHG | HEART RATE: 92 BPM

## 2023-04-10 DIAGNOSIS — R06.02 SHORTNESS OF BREATH: ICD-10-CM

## 2023-04-10 DIAGNOSIS — R07.9 LEFT-SIDED CHEST PAIN: ICD-10-CM

## 2023-04-10 LAB
ALBUMIN SERPL BCP-MCNC: 5 G/DL (ref 3.2–4.9)
ALBUMIN/GLOB SERPL: 1.7 G/DL
ALP SERPL-CCNC: 85 U/L (ref 30–99)
ALT SERPL-CCNC: 19 U/L (ref 2–50)
ANION GAP SERPL CALC-SCNC: 15 MMOL/L (ref 7–16)
AST SERPL-CCNC: 11 U/L (ref 12–45)
BASOPHILS # BLD AUTO: 0.6 % (ref 0–1.8)
BASOPHILS # BLD: 0.04 K/UL (ref 0–0.12)
BILIRUB SERPL-MCNC: 0.4 MG/DL (ref 0.1–1.5)
BUN SERPL-MCNC: 19 MG/DL (ref 8–22)
CALCIUM ALBUM COR SERPL-MCNC: 9.1 MG/DL (ref 8.5–10.5)
CALCIUM SERPL-MCNC: 9.9 MG/DL (ref 8.5–10.5)
CHLORIDE SERPL-SCNC: 110 MMOL/L (ref 96–112)
CO2 SERPL-SCNC: 17 MMOL/L (ref 20–33)
CREAT SERPL-MCNC: 0.73 MG/DL (ref 0.5–1.4)
D DIMER PPP IA.FEU-MCNC: <0.27 UG/ML (FEU) (ref 0–0.5)
EKG IMPRESSION: NORMAL
EOSINOPHIL # BLD AUTO: 0.14 K/UL (ref 0–0.51)
EOSINOPHIL NFR BLD: 2 % (ref 0–6.9)
ERYTHROCYTE [DISTWIDTH] IN BLOOD BY AUTOMATED COUNT: 39.3 FL (ref 35.9–50)
GFR SERPLBLD CREATININE-BSD FMLA CKD-EPI: 111 ML/MIN/1.73 M 2
GLOBULIN SER CALC-MCNC: 2.9 G/DL (ref 1.9–3.5)
GLUCOSE SERPL-MCNC: 92 MG/DL (ref 65–99)
HCG SERPL QL: NEGATIVE
HCT VFR BLD AUTO: 44.4 % (ref 37–47)
HGB BLD-MCNC: 15.2 G/DL (ref 12–16)
IMM GRANULOCYTES # BLD AUTO: 0.02 K/UL (ref 0–0.11)
IMM GRANULOCYTES NFR BLD AUTO: 0.3 % (ref 0–0.9)
LYMPHOCYTES # BLD AUTO: 2.12 K/UL (ref 1–4.8)
LYMPHOCYTES NFR BLD: 30.6 % (ref 22–41)
MCH RBC QN AUTO: 30.2 PG (ref 27–33)
MCHC RBC AUTO-ENTMCNC: 34.2 G/DL (ref 33.6–35)
MCV RBC AUTO: 88.3 FL (ref 81.4–97.8)
MONOCYTES # BLD AUTO: 0.54 K/UL (ref 0–0.85)
MONOCYTES NFR BLD AUTO: 7.8 % (ref 0–13.4)
NEUTROPHILS # BLD AUTO: 4.07 K/UL (ref 2–7.15)
NEUTROPHILS NFR BLD: 58.7 % (ref 44–72)
NRBC # BLD AUTO: 0 K/UL
NRBC BLD-RTO: 0 /100 WBC
NT-PROBNP SERPL IA-MCNC: 109 PG/ML (ref 0–125)
PLATELET # BLD AUTO: 188 K/UL (ref 164–446)
PMV BLD AUTO: 11.5 FL (ref 9–12.9)
POTASSIUM SERPL-SCNC: 3.8 MMOL/L (ref 3.6–5.5)
PROT SERPL-MCNC: 7.9 G/DL (ref 6–8.2)
RBC # BLD AUTO: 5.03 M/UL (ref 4.2–5.4)
SODIUM SERPL-SCNC: 142 MMOL/L (ref 135–145)
TROPONIN T SERPL-MCNC: <6 NG/L (ref 6–19)
TSH SERPL DL<=0.005 MIU/L-ACNC: 2.41 UIU/ML (ref 0.38–5.33)
WBC # BLD AUTO: 6.9 K/UL (ref 4.8–10.8)

## 2023-04-10 PROCEDURE — 80053 COMPREHEN METABOLIC PANEL: CPT

## 2023-04-10 PROCEDURE — 84703 CHORIONIC GONADOTROPIN ASSAY: CPT

## 2023-04-10 PROCEDURE — 36415 COLL VENOUS BLD VENIPUNCTURE: CPT

## 2023-04-10 PROCEDURE — 93005 ELECTROCARDIOGRAM TRACING: CPT | Performed by: EMERGENCY MEDICINE

## 2023-04-10 PROCEDURE — 84443 ASSAY THYROID STIM HORMONE: CPT

## 2023-04-10 PROCEDURE — 83880 ASSAY OF NATRIURETIC PEPTIDE: CPT

## 2023-04-10 PROCEDURE — 99283 EMERGENCY DEPT VISIT LOW MDM: CPT

## 2023-04-10 PROCEDURE — 93005 ELECTROCARDIOGRAM TRACING: CPT

## 2023-04-10 PROCEDURE — 84484 ASSAY OF TROPONIN QUANT: CPT

## 2023-04-10 PROCEDURE — 94760 N-INVAS EAR/PLS OXIMETRY 1: CPT

## 2023-04-10 PROCEDURE — 71045 X-RAY EXAM CHEST 1 VIEW: CPT

## 2023-04-10 PROCEDURE — 85025 COMPLETE CBC W/AUTO DIFF WBC: CPT

## 2023-04-10 PROCEDURE — 85379 FIBRIN DEGRADATION QUANT: CPT

## 2023-04-10 ASSESSMENT — FIBROSIS 4 INDEX: FIB4 SCORE: 0.64

## 2023-04-10 NOTE — PATIENT COMMUNICATION
"Phone Number Called: [phone]    Call outcome: Spoke to patient regarding message below.    Message: Pt states that dusky blue lips are occurring more frequently and at random times. Will last for about a half hour now where as before it was lasting for only a few minutes. Pt states chest pain is also happening more frequently and still goes up her neck into her arm left. The chest pain comes and goes and is more sharp now. Also feels like she is \"drowning under water\" sometimes and notices blueness in lips when that happens. Her oxygen saturation is around 94% now and usually is around 98%.  Pt advised to go to ER due to signs of worsening hypoxemia & ACS concerns. Pt verbalized understanding.    Pt did not want to see any APRN's, but agreed to see another provider in office due to urgent needs.Pt scheduled for appointment with first available provider on care team.   Next OV w/ ERICA on 05/08 @ 1:00pm    TO ERICA: RIGOBERTO  Last seen by KRYSTIAN    "

## 2023-04-10 NOTE — ED TRIAGE NOTES
"Chief Complaint   Patient presents with    Shortness of Breath     Patient reports \"I feel like I am drowning in fluid in my own lungs\". Patient reports experiencing blue lips intermittently during the day    Sent by MD     Patient sent by cardiologist for evaluation. Patient reports hx of POTS and heart arrhythmia        33 yo female to triage for above complaint.     Pt is alert and oriented, speaking in full sentences, follows commands and responds appropriately to questions.     Patient placed back in lobby and educated on triage process. Asked to inform RN of any changes.    BP (!) 149/87   Pulse 75   Temp 36.7 °C (98 °F) (Temporal)   Resp 16   Ht 1.626 m (5' 4\")   Wt 104 kg (229 lb 11.5 oz)   SpO2 97%   BMI 39.43 kg/m²     "

## 2023-04-11 NOTE — ED PROVIDER NOTES
"                                                        ED Provider Note    CHIEF COMPLAINT  Chief Complaint   Patient presents with    Shortness of Breath     Patient reports \"I feel like I am drowning in fluid in my own lungs\". Patient reports experiencing blue lips intermittently during the day    Sent by MD     Patient sent by cardiologist for evaluation. Patient reports hx of POTS and heart arrhythmia         [facility]    Primary care provider: Torres Brody M.D.   History obtained from: Patient  History limited by: None     Kristin Balderrama is a 32 y.o. female who presents to the ED complaining of shortness of breath for about 1 month.  Patient states \"I feel like I am drowning in fluid in my lungs even though I do not have fluid in my lungs.\"  She also reports that her lips has been turning blue intermittently for the past month.  She reports history of POTS and cardiac arrhythmia and states that she was told by cardiology to come to the ED for evaluation.  Patient states that she has had constant left upper chest pain for the past 24 hours.  She denies fever.  No significant cough.  No nausea/vomiting/diarrhea/dysuria/rash/edema.    REVIEW OF SYSTEMS  Please see HPI for pertinent positives/negatives.  All other systems reviewed and are negative.     PAST MEDICAL HISTORY  Past Medical History:   Diagnosis Date    Bronchitis 02/08/2022    Last time was 12/21    Dental disorder 03/08/2021    infected tooth    Other fatigue 06/29/2020    Sinus tachycardia 10/31/2013    Pain 08/15/2012    back, 7/10    Chronic UTI 09/18/2010    Abdominal pain     Anginal syndrome     random chest pain especially with tachycardia    Apnea, sleep     Arrhythmia     \"sinus tachycardia\", cariologist, Dr. Kumar; ablation 2/2016    Arthritis     osteo    ASTHMA     when around smoke    Back pain     Borderline personality disorder (HCC)     Breath shortness     with tachycardia    Cardiac arrhythmia     Chickenpox     Cough     Daytime " "sleepiness     Depression     Diabetes (HCC)     Diarrhea     incontinece    Disorder of thyroid     Hashimoto's    Fall     Fatigue     Frequent headaches     Gasping for breath     Gynecological disorder     PCOS    Headache(784.0)     Heart burn     Heart murmur     History of falling     Indigestion     Migraine     Mitochondrial disease (HCC)     Multiple personality disorder (HCC)     Nausea     Obesity     Painful joint     PCOS (polycystic ovarian syndrome)     Pneumonia 2012 12/2020    Psychosis (HCC)     Ringing in ears     Scoliosis     Shortness of breath     O2 as needed    Sleep apnea     CPAP \"pulmonary doctor took me off mid year 2016\"    Snoring     Tonsillitis     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HCC)     2/8/22: Per pt: not anymore    Tuberculosis     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    Urinary bladder disorder     Suprapubic cath. 2/8/22: Not anymore.     Urinary incontinence     Weakness     Wears glasses         SURGICAL HISTORY  Past Surgical History:   Procedure Laterality Date    PB PERCUT FIX PBOX/NECK FEMUR FX Left 1/28/2023    Procedure: FIXATION, HIP, USING CANNULATED SCREW;  Surgeon: Noman Abdul M.D.;  Location: [facility];  Service: Orthopedics    MO LAP,DIAGNOSTIC ABDOMEN  2/14/2022    Procedure: LAPAROSCOPY;  Surgeon: Seamus Pisano M.D.;  Location: SURGERY SAME DAY [facility];  Service: Gynecology    OVARIAN CYSTECTOMY Right 2/14/2022    Procedure: EXCISION, CYST, OVARY;  Surgeon: Seamus Pisano M.D.;  Location: SURGERY SAME DAY [facility];  Service: Gynecology    BOWEL STIMULATOR INSERTION  3/10/2021    Procedure: INSERTION, ELECTRODE LEADS AND PULSE GENERATOR, NEUROSTIMULATOR, SACRAL - REMOVAL OF INTERSTIM WITH REPLACEMENT OF SACRAL NEUROMODULATION DEVICE;  Surgeon: Joe Noyola M.D.;  Location: [facility];  Service: General    MUSCLE BIOPSY Right 1/26/2017    Procedure: MUSCLE BIOPSY - THIGH;  Surgeon: Isidro Vigil M.D.;  Location: [facility]" ORS;  Service:     GASTROSCOPY WITH BALLOON DILATATION N/A 1/4/2017    Procedure: GASTROSCOPY WITH DILATATION;  Surgeon: Torres Vargas M.D.;  Location: SURGERY [facility];  Service:     BOWEL STIMULATOR INSERTION  7/13/2016    Procedure: BOWEL STIMULATOR INSERTION FOR PERMANENT INTERSTIM SACRAL IMPLANT;  Surgeon: Joe Noyola M.D.;  Location: SURGERY [facility];  Service:     RECOVERY  1/27/2016    Procedure: CATH LAB EP STUDY WITH SINUS NODE MODIFICATION LA;  Surgeon: [facility];  Location: SURGERY PRE-POST PROC UNIT [facility];  Service:     OTHER CARDIAC SURGERY  1/2016    cardiac ablation    NEURO DEST FACET L/S W/IG SNGL  4/21/2015    Performed by Reza Tabor at SURGERY [facility]    LUMBAR FUSION ANTERIOR  8/21/2012    Performed by SUSIE SAWANT at SURGERY [facility]    ALYSSA BY LAPAROSCOPY  8/29/2010    Performed by SHAYY JOHNS at SURGERY [facility]    LAMINOTOMY      OTHER ABDOMINAL SURGERY      TONSILLECTOMY      tonsillectomy        SOCIAL HISTORY  Social History     Tobacco Use    Smoking status: Never    Smokeless tobacco: Never   Vaping Use    Vaping Use: Never used   Substance and Sexual Activity    Alcohol use: No     Alcohol/week: 0.0 oz    Drug use: Not Currently     Frequency: 7.0 times per week     Types: Marijuana    Sexual activity: Not Currently     Birth control/protection: Implant        FAMILY HISTORY  Family History   Problem Relation Age of Onset    Hypertension Mother     Sleep Apnea Mother     Heart Disease Mother     Other Mother         hypothryod    Hypertension Maternal Uncle     Heart Disease Maternal Grandmother     Hypertension Maternal Grandmother     No Known Problems Sister     Other Sister         Narcolepsy;fibromyalsia;bone;nerve    Genitourinary () Problems Sister         endometriosis        CURRENT MEDICATIONS  Home Medications       Reviewed by Eulalia Holcomb R.N. (Registered Nurse) on 04/10/23 at 1636  Med List Status:  "Partial     Medication Last Dose Status   albuterol 108 (90 Base) MCG/ACT Aero Soln inhalation aerosol  Active   diphenhydrAMINE (BENADRYL) 25 MG capsule  Active   etonogestrel (NEXPLANON) 68 MG Implant implant  Active   gabapentin (NEURONTIN) 300 MG Cap  Active   ivabradine (CORLANOR) 7.5 MG Tab tablet  Active   Melatonin 10 MG Tab  Active   methocarbamol (ROBAXIN) 500 MG Tab  Active   metoprolol SR (TOPROL XL) 25 MG TABLET SR 24 HR  Active   sodium chloride (SALT) 1 GM Tab  Active   traZODone (DESYREL) 100 MG Tab  Active   ziprasidone (GEODON) 40 MG Cap  Active                     ALLERGIES  Allergies   Allergen Reactions    Cefdinir Shortness of Breath and Itching     Tolerated 1/18/17  Tolerates ceftriaxone  Tolerated augmentin 8/2019     Depakote [Divalproex Sodium] Unspecified     Muscle spasms/muscle pain and weakness      Doxycycline Anaphylaxis and Vomiting     Other reaction(s): pustules/blisters  Other reaction(s): stomach pain    Montelukast [Singulair] Unspecified     Cardiac effusion    Vancomycin Itching     Pt becomes flushed in face and chest.   RXN=7/10/16    Wound Dressing Adhesive Rash     By pt report-\"removes skin totally off\"    Amitriptyline Unspecified     Headaches      Aripiprazole [Abilify] Unspecified     Headaches/muscle twitching      Clindamycin Nausea         Other reaction(s): nausea stomach pain    Flomax [Tamsulosin Hydrochloride] Swelling    Levaquin Unspecified     Severe muscle cramps in legs  RXN=unknown  Other reaction(s): leg muscle cramps    Metformin Unspecified     Increased lactic acid     Other reaction(s): itching and rash/nausea vomiting    Tamsulosin Swelling     Swelling of legs    Tape Rash     Tears skin off  coban with Tegaderm tape ok intermittently  RXN=ongoing    Amoxicillin Rash    Depakote [Divalproex Sodium]     Erythromycin Rash     .  Other reaction(s): nausea stomach pain    Hydromorphone      Other reaction(s): vomiting    Ampicillin Rash     Pt reports " "that she received a rash     Ciprofloxacin Rash          Keflex Rash     Pt states she gets a rash with this medication  Tolerates ceftriaxone  Can take with Benadryl    Levofloxacin Unspecified     Leg muscle cramps    Metronidazole Rash     \"Vision problems\"  Other reaction(s): vision problems    Penicillins Hives     Can take with Benadryl    Sulfa Drugs Itching and Myalgia     Muscle pain and weakness  Other reaction(s): unknown    Valproic Acid Rash     .        PHYSICAL EXAM  VITAL SIGNS: /79   Pulse 92   Temp 37.1 °C (98.7 °F) (Temporal)   Resp 20   Ht 1.626 m (5' 4\")   Wt 104 kg (229 lb 11.5 oz)   SpO2 98%   BMI 39.43 kg/m²  @JODI[[id number]::@     Pulse ox interpretation: 98% I interpret this pulse ox as normal     Cardiac monitor interpretation: Sinus rhythm with heart rate in the 60s as interpreted by me.  The patient presented with chest pain and shortness of breath and cardiac monitor was ordered to monitor for dysrhythmia.    Constitutional: Well developed, well nourished, alert and playing on her cell phone in no apparent distress, nontoxic appearance    HENT: No external signs of trauma, normocephalic  Eyes: PERRL, conjunctiva without erythema, no discharge, no icterus    Neck: Soft and supple, trachea midline, no stridor, no tenderness, no LAD, no JVD, good ROM    Cardiovascular: Regular rate and rhythm, no murmurs/rubs/gallops, strong distal pulses and good perfusion    Thorax & Lungs: No respiratory distress, CTAB    Abdomen: Soft, nontender, nondistended, no guarding, no rebound, normal BS    Back: No CVAT     Extremities: No cyanosis, no edema, no gross deformity, good ROM, no tenderness, intact distal pulses with brisk cap refill    Skin: Warm, dry, no pallor/cyanosis, no rash noted      Lymphatic: No lymphadenopathy noted     Neuro: A/O times 3, no focal deficits noted    Psychiatric: Cooperative      DIAGNOSTIC STUDIES / PROCEDURES    EKG  12 Lead EKG obtained at 1628 and interpreted " by me:   Rate: 74   Rhythm: Sinus rhythm   Ectopy: None  Intervals: Normal   Axis: Normal   QRS: Normal   ST segments: Normal  T Waves: Normal    Clinical Impression: Sinus rhythm without acute ischemic changes or dysrhythmia  Compared to February 3, 2023 without significant change      LABS  All labs reviewed by me.     Results for orders placed or performed during the hospital encounter of 04/10/23   CBC with Differential   Result Value Ref Range    WBC 6.9 4.8 - 10.8 K/uL    RBC 5.03 4.20 - 5.40 M/uL    Hemoglobin 15.2 12.0 - 16.0 g/dL    Hematocrit 44.4 37.0 - 47.0 %    MCV 88.3 81.4 - 97.8 fL    MCH 30.2 27.0 - 33.0 pg    MCHC 34.2 33.6 - 35.0 g/dL    RDW 39.3 35.9 - 50.0 fL    Platelet Count 188 164 - 446 K/uL    MPV 11.5 9.0 - 12.9 fL    Neutrophils-Polys 58.70 44.00 - 72.00 %    Lymphocytes 30.60 22.00 - 41.00 %    Monocytes 7.80 0.00 - 13.40 %    Eosinophils 2.00 0.00 - 6.90 %    Basophils 0.60 0.00 - 1.80 %    Immature Granulocytes 0.30 0.00 - 0.90 %    Nucleated RBC 0.00 /100 WBC    Neutrophils (Absolute) 4.07 2.00 - 7.15 K/uL    Lymphs (Absolute) 2.12 1.00 - 4.80 K/uL    Monos (Absolute) 0.54 0.00 - 0.85 K/uL    Eos (Absolute) 0.14 0.00 - 0.51 K/uL    Baso (Absolute) 0.04 0.00 - 0.12 K/uL    Immature Granulocytes (abs) 0.02 0.00 - 0.11 K/uL    NRBC (Absolute) 0.00 K/uL   Complete Metabolic Panel (CMP)   Result Value Ref Range    Sodium 142 135 - 145 mmol/L    Potassium 3.8 3.6 - 5.5 mmol/L    Chloride 110 96 - 112 mmol/L    Co2 17 (L) 20 - 33 mmol/L    Anion Gap 15.0 7.0 - 16.0    Glucose 92 65 - 99 mg/dL    Bun 19 8 - 22 mg/dL    Creatinine 0.73 0.50 - 1.40 mg/dL    Calcium 9.9 8.5 - 10.5 mg/dL    AST(SGOT) 11 (L) 12 - 45 U/L    ALT(SGPT) 19 2 - 50 U/L    Alkaline Phosphatase 85 30 - 99 U/L    Total Bilirubin 0.4 0.1 - 1.5 mg/dL    Albumin 5.0 (H) 3.2 - 4.9 g/dL    Total Protein 7.9 6.0 - 8.2 g/dL    Globulin 2.9 1.9 - 3.5 g/dL    A-G Ratio 1.7 g/dL   Troponin STAT   Result Value Ref Range    Troponin T  <6 6 - 19 ng/L   D-DIMER   Result Value Ref Range    D-Dimer <0.27 0.00 - 0.50 ug/mL (FEU)   BETA-HCG QUALITATIVE SERUM   Result Value Ref Range    Beta-Hcg Qualitative Serum Negative Negative   TSH WITH REFLEX TO FT4   Result Value Ref Range    TSH 2.410 0.380 - 5.330 uIU/mL   CORRECTED CALCIUM   Result Value Ref Range    Correct Calcium 9.1 8.5 - 10.5 mg/dL   ESTIMATED GFR   Result Value Ref Range    GFR (CKD-EPI) 111 >60 mL/min/1.73 m 2   proBrain Natriuretic Peptide, NT   Result Value Ref Range    NT-proBNP 109 0 - 125 pg/mL   EKG   Result Value Ref Range    Report       [facility]    Test Date:  2023-04-10  Pt Name:    HARLEY DE LA CRUZ              Department: ER  MRN:        [id number]                      Room:  Gender:     Female                       Technician: [id number]  :        [id number]                   Requested By:ER TRIAGE PROTOCOL  Order #:    [id number]                    Reading MD:    Measurements  Intervals                                Axis  Rate:       74                           P:          12  PA:         142                          QRS:        10  QRSD:       104                          T:          -11  QT:         388  QTc:        431    Interpretive Statements  Sinus rhythm  Borderline T abnormalities, diffuse leads  Compared to ECG 2023 18:26:43  No significant changes       *Note: Due to a large number of results and/or encounters for the requested time period, some results have not been displayed. A complete set of results can be found in Results Review.        RADIOLOGY  I have independently interpreted the diagnostic imaging associated with this visit and am waiting the final reading from the radiologist.     DX-CHEST-PORTABLE (1 VIEW)   Final Result      1.  Minimal perihilar bronchial wall thickening can be seen with edema or viral pneumonitis.             COURSE & MEDICAL DECISION MAKING  Nursing notes, VS, PMSFHx reviewed in chart.      Review of past medical records shows the patient contacted the [facility] yesterday regarding her symptoms and was told to come to the ED for evaluation.  She was seen at urgent care on March 18, 2023 for possible left thigh abscess.  She was seen at [facility] on March 6, 2023 for follow-up of left hip fracture.  She was seen at [place] ED on February 13, 2023 for chest pain.  Last CT chest on February 17, 2022 had the following findings:     1.  No evidence of pulmonary embolism.  2.  Trace bilateral pleural effusions.  3.  Clear lungs without consolidation.    Last cardiac stress test on April 6, 2017 was unremarkable.  Last cardiac echo on December 26, 2022 had the following findings:    Normal left ventricular systolic function.  The left ventricular ejection fraction is visually estimated to be 55%.  Normal right ventricular size and systolic function.  Right heart pressures are normal.      Differential diagnoses considered include but are not limited to: AMI, pericardial effusion/tamponade, pericarditis, CHF/pulm edema, PE, pneumothorax, pneumonia, pleural effusion, anxiety/hyperventilation       ED Observation Status? Yes; I am placing the patient in to an observation status due to a diagnostic uncertainty as well as therapeutic intensity. Patient placed in observation status at 7:08 PM, 4/10/2023.     Observation plan is as follows: We will obtain EKG, imaging and laboratory studies and monitor patient in the ED.    Upon Reevaluation, the patient's condition has: Remained stable and will be discharged.    Patient discharged from ED Observation status at 2030 on April 10, 2023.      INITIAL ASSESSMENT AND PLAN  Care Narrative: This is a 32-year-old female patient with multiple past medical conditions who presents to the ED complaining of shortness of breath and lips turning blue for the past month as well as left upper chest pain for the past 24 hours.  Will obtain EKG, imaging and  laboratory studies and closely monitor patient in the ED.      Discussion of management with other Q or appropriate source(s): None     Escalation of care considered, and ultimately not performed: acute inpatient care management, however at this time, the patient is most appropriate for outpatient management.     Decision tools and prescription drugs considered including, but not limited to: Pain Medications   and Medication modification   .        Pt risk-stratified as low risk for MACE in the next 6 weeks by HEART Score (0-3): 2    HISTORY  Highly suspicious +2  Moderately suspicious +1  Slightly suspicious 0    EKG  Significant ST depression +2  Nonspecific repolarization disturbance +1  Normal 0    AGE  ? 65 +2  45-65 +1  < 45 0    RISK FACTORS  Hypercholesterolemia, HTN, DM, Cigarette smoking, positive family history, obesity  ? 3 risk factors or history of atherosclerotic disease +2  1-2 risk factors +1  No risk factors known 0    TROPONIN  ? 3× normal limit +2  1-3× normal limit +1  ? normal limit 0      History and physical exam as above.  Work-up for her symptoms was initiated.  EKG without acute findings.  Laboratory studies were fairly unremarkable and unrevealing.  Chest x-ray with findings as above.  Patient was monitored in the ED and remained clinically stable.  She was noted to ambulate without difficulty.  She spent most of her ED stay playing on her cell phone and was noted to be in no acute distress and nontoxic in appearance.  No cyanosis noted.  No evidence for hypoxia or hemodynamic instability.  No worrisome dysrhythmia noted.  At this point, I have low clinical suspicion for emergent pathology such as ACS, dissection, tamponade, decompensated CHF, PE given history/exam/findings.  She was advised on outpatient follow-up and given return to ED precautions.  Patient verbalized understanding and agreed with plan of care with no further questions or concerns.      The patient is referred to a  primary physician for blood pressure management, diabetic screening, and for all other preventative health concerns.       FINAL IMPRESSION  1. Shortness of breath Acute   2. Left-sided chest pain Acute          DISPOSITION  Patient will be discharged home in stable condition.       FOLLOW UP  Torres Brody M.D.  [address]  [address]  [place] [id number] [phone]    Call in 1 day      John Leon M.D.  [address]  [address]  [place] [id number] [phone]    Call in 1 day      [facility], Emergency Dept  [address] [facility] [place] [id number] [phone]    If symptoms worsen         OUTPATIENT MEDICATIONS  Discharge Medication List as of 4/10/2023  8:40 PM             Electronically signed by: Yair Diallo D.O., 4/10/2023 6:59 PM      Portions of this record were made with voice recognition software.  Despite my review, spelling/grammar/context errors may still remain.  Interpretation of this chart should be taken in this context.

## 2023-04-25 ENCOUNTER — HOSPITAL ENCOUNTER (OUTPATIENT)
Dept: LAB | Facility: MEDICAL CENTER | Age: 33
End: 2023-04-25
Attending: OTOLARYNGOLOGY
Payer: MEDICARE

## 2023-04-25 PROCEDURE — 36415 COLL VENOUS BLD VENIPUNCTURE: CPT

## 2023-04-25 PROCEDURE — 85652 RBC SED RATE AUTOMATED: CPT

## 2023-04-26 ENCOUNTER — HOSPITAL ENCOUNTER (EMERGENCY)
Facility: MEDICAL CENTER | Age: 33
End: 2023-04-26
Attending: EMERGENCY MEDICINE
Payer: MEDICARE

## 2023-04-26 ENCOUNTER — APPOINTMENT (OUTPATIENT)
Dept: RADIOLOGY | Facility: MEDICAL CENTER | Age: 33
End: 2023-04-26
Attending: EMERGENCY MEDICINE
Payer: MEDICARE

## 2023-04-26 VITALS
HEIGHT: 64 IN | TEMPERATURE: 97.2 F | BODY MASS INDEX: 39.67 KG/M2 | WEIGHT: 232.37 LBS | SYSTOLIC BLOOD PRESSURE: 146 MMHG | DIASTOLIC BLOOD PRESSURE: 82 MMHG | OXYGEN SATURATION: 99 % | HEART RATE: 73 BPM | RESPIRATION RATE: 15 BRPM

## 2023-04-26 DIAGNOSIS — R07.9 CHEST PAIN, UNSPECIFIED TYPE: Primary | ICD-10-CM

## 2023-04-26 LAB
ANION GAP SERPL CALC-SCNC: 14 MMOL/L (ref 7–16)
BASOPHILS # BLD AUTO: 0.4 % (ref 0–1.8)
BASOPHILS # BLD: 0.03 K/UL (ref 0–0.12)
BUN SERPL-MCNC: 14 MG/DL (ref 8–22)
CALCIUM SERPL-MCNC: 9.9 MG/DL (ref 8.5–10.5)
CHLORIDE SERPL-SCNC: 111 MMOL/L (ref 96–112)
CO2 SERPL-SCNC: 18 MMOL/L (ref 20–33)
CREAT SERPL-MCNC: 0.71 MG/DL (ref 0.5–1.4)
D DIMER PPP IA.FEU-MCNC: <0.27 UG/ML (FEU) (ref 0–0.5)
EKG IMPRESSION: NORMAL
EOSINOPHIL # BLD AUTO: 0.11 K/UL (ref 0–0.51)
EOSINOPHIL NFR BLD: 1.6 % (ref 0–6.9)
ERYTHROCYTE [DISTWIDTH] IN BLOOD BY AUTOMATED COUNT: 39.2 FL (ref 35.9–50)
ERYTHROCYTE [SEDIMENTATION RATE] IN BLOOD BY WESTERGREN METHOD: 6 MM/HOUR (ref 0–25)
GFR SERPLBLD CREATININE-BSD FMLA CKD-EPI: 115 ML/MIN/1.73 M 2
GLUCOSE SERPL-MCNC: 92 MG/DL (ref 65–99)
HCT VFR BLD AUTO: 42.3 % (ref 37–47)
HGB BLD-MCNC: 14.6 G/DL (ref 12–16)
IMM GRANULOCYTES # BLD AUTO: 0.03 K/UL (ref 0–0.11)
IMM GRANULOCYTES NFR BLD AUTO: 0.4 % (ref 0–0.9)
LYMPHOCYTES # BLD AUTO: 2.32 K/UL (ref 1–4.8)
LYMPHOCYTES NFR BLD: 32.9 % (ref 22–41)
MCH RBC QN AUTO: 30 PG (ref 27–33)
MCHC RBC AUTO-ENTMCNC: 34.5 G/DL (ref 33.6–35)
MCV RBC AUTO: 87 FL (ref 81.4–97.8)
MONOCYTES # BLD AUTO: 0.57 K/UL (ref 0–0.85)
MONOCYTES NFR BLD AUTO: 8.1 % (ref 0–13.4)
NEUTROPHILS # BLD AUTO: 3.99 K/UL (ref 2–7.15)
NEUTROPHILS NFR BLD: 56.6 % (ref 44–72)
NRBC # BLD AUTO: 0 K/UL
NRBC BLD-RTO: 0 /100 WBC
NT-PROBNP SERPL IA-MCNC: 81 PG/ML (ref 0–125)
PLATELET # BLD AUTO: 176 K/UL (ref 164–446)
PMV BLD AUTO: 11.1 FL (ref 9–12.9)
POTASSIUM SERPL-SCNC: 3.9 MMOL/L (ref 3.6–5.5)
RBC # BLD AUTO: 4.86 M/UL (ref 4.2–5.4)
SODIUM SERPL-SCNC: 143 MMOL/L (ref 135–145)
TROPONIN T SERPL-MCNC: <6 NG/L (ref 6–19)
WBC # BLD AUTO: 7.1 K/UL (ref 4.8–10.8)

## 2023-04-26 PROCEDURE — 93005 ELECTROCARDIOGRAM TRACING: CPT

## 2023-04-26 PROCEDURE — 700117 HCHG RX CONTRAST REV CODE 255: Performed by: EMERGENCY MEDICINE

## 2023-04-26 PROCEDURE — 85025 COMPLETE CBC W/AUTO DIFF WBC: CPT

## 2023-04-26 PROCEDURE — 99284 EMERGENCY DEPT VISIT MOD MDM: CPT

## 2023-04-26 PROCEDURE — 36415 COLL VENOUS BLD VENIPUNCTURE: CPT

## 2023-04-26 PROCEDURE — 93005 ELECTROCARDIOGRAM TRACING: CPT | Performed by: EMERGENCY MEDICINE

## 2023-04-26 PROCEDURE — 80048 BASIC METABOLIC PNL TOTAL CA: CPT

## 2023-04-26 PROCEDURE — 83880 ASSAY OF NATRIURETIC PEPTIDE: CPT

## 2023-04-26 PROCEDURE — 84484 ASSAY OF TROPONIN QUANT: CPT

## 2023-04-26 PROCEDURE — 85379 FIBRIN DEGRADATION QUANT: CPT

## 2023-04-26 PROCEDURE — 74175 CTA ABDOMEN W/CONTRAST: CPT

## 2023-04-26 RX ADMIN — IOHEXOL 75 ML: 350 INJECTION, SOLUTION INTRAVENOUS at 22:12

## 2023-04-26 ASSESSMENT — FIBROSIS 4 INDEX: FIB4 SCORE: 0.44

## 2023-04-26 ASSESSMENT — PAIN DESCRIPTION - PAIN TYPE
TYPE: ACUTE PAIN
TYPE: ACUTE PAIN

## 2023-04-26 NOTE — ED TRIAGE NOTES
Chief Complaint   Patient presents with    Shortness of Breath     This afternoon. Able to speak in full sentences. Told RN my arm turned blue. Skin is pwd in triage.    Chest Pain     This after while resting describes as sharp constant and deep pain. Has hx of mitral valve regurgitation and cardiac ablation.     Vitals:    04/26/23 1559   BP: (!) 157/105   Pulse: 98   Resp: 18   Temp: 37 °C (98.6 °F)   SpO2: 98%

## 2023-04-27 NOTE — ED PROVIDER NOTES
"  ER Provider Note    Scribed for Tyrell Calderon II, M.D. by Natan Suggs. 4/26/2023  6:16 PM    Primary Care Provider: Torres Brody M.D.    CHIEF COMPLAINT  Chief Complaint   Patient presents with    Shortness of Breath     This afternoon. Able to speak in full sentences. Told RN my arm turned blue. Skin is pwd in triage.    Chest Pain     This after while resting describes as sharp constant and deep pain. Has hx of mitral valve regurgitation and cardiac ablation.     EXTERNAL RECORDS REVIEWED  Most recent office visit was 2 days ago for left elbow pain.  Also with lateral epicondylitis.  Provided with a wrist splint.  Reviewed cardiology messages between her and cardiology clinic RN during the second week of April.  She mentions symptoms of lip sometimes turning blue, saturations dropping.  Was told to go to the ER for ACS work-up.  ER work-up was unremarkable.  Reviewed several other visits.  She presents with various symptoms.  She has had extensive work-ups.  She has been previously diagnosed with Annetta-Danlos syndrome.  No known aneurysmal disease.  I have not seen many patients with more CT imaging in her.    HPI/ROS  LIMITATION TO HISTORY   Select: : None  OUTSIDE HISTORIAN(S):  None    Kristin Balderrama is a 32 y.o. female with a history of Annetta-Danlos syndrome, pulmonary embolisms, mitral valve regurgitation, cardiac ablation, and POTS who presents to the ED for evaluation of sharp constant chest pain onset 4-5 hours ago. She notes that her pain radiates up her neck and down her left arm. She describes also experiencing intermittent episodes throughout today where her arms turned blue. She notes that she initially noticed her lips turning blue intermittently 1 month ago for approximately 40 minutes at a time with no known triggers, and she felt like she was \"dry drowning\". She states she also has shortness of breath, dizziness, head fogginess, nausea, mild bilateral leg swelling, and clammy " "skin, but denies any fevers or vomiting.  No alleviating or exacerbating factors were noted. She is followed by Dr. Leon  (Cardiology). Her daily medications include Corlanor and Metoprolol, which she takes as prescribed.    PAST MEDICAL HISTORY  Past Medical History:   Diagnosis Date    Abdominal pain     Anginal syndrome     random chest pain especially with tachycardia    Apnea, sleep     Arrhythmia     \"sinus tachycardia\", cariologist, Dr. Kumar; ablation 2/2016    Arthritis     osteo    ASTHMA     when around smoke    Back pain     Borderline personality disorder (HCC)     Breath shortness     with tachycardia    Bronchitis 02/08/2022    Last time was 12/21    Cardiac arrhythmia     Chickenpox     Chronic UTI 09/18/2010    Cough     Daytime sleepiness     Dental disorder 03/08/2021    infected tooth    Depression     Diabetes (HCC)     Diarrhea     incontinece    Disorder of thyroid     Hashimoto's    Fall     Fatigue     Frequent headaches     Gasping for breath     Gynecological disorder     PCOS    Headache(784.0)     Heart burn     Heart murmur     History of falling     Indigestion     Migraine     Mitochondrial disease (HCC)     Multiple personality disorder (HCC)     Nausea     Obesity     Other fatigue 06/29/2020    Pain 08/15/2012    back, 7/10    Painful joint     PCOS (polycystic ovarian syndrome)     Pneumonia 2012 12/2020    Psychosis (HCC)     Ringing in ears     Scoliosis     Shortness of breath     O2 as needed    Sinus tachycardia 10/31/2013    Sleep apnea     CPAP \"pulmonary doctor took me off mid year 2016\"    Snoring     Tonsillitis     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HCC)     2/8/22: Per pt: not anymore    Tuberculosis     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    Urinary bladder disorder     Suprapubic cath. 2/8/22: Not anymore.     Urinary incontinence     Weakness     Wears glasses      SURGICAL HISTORY  Past Surgical History:   Procedure Laterality Date    PB PERCUT FIX PBOX/NECK FEMUR " FX Left 1/28/2023    Procedure: FIXATION, HIP, USING CANNULATED SCREW;  Surgeon: Noman Abdul M.D.;  Location: SURGERY [facility];  Service: Orthopedics    [place] LAP,DIAGNOSTIC ABDOMEN  2/14/2022    Procedure: LAPAROSCOPY;  Surgeon: Seamus Pisano M.D.;  Location: SURGERY SAME DAY [facility];  Service: Gynecology    OVARIAN CYSTECTOMY Right 2/14/2022    Procedure: EXCISION, CYST, OVARY;  Surgeon: Seamus Pisano M.D.;  Location: SURGERY SAME DAY [facility];  Service: Gynecology    BOWEL STIMULATOR INSERTION  3/10/2021    Procedure: INSERTION, ELECTRODE LEADS AND PULSE GENERATOR, NEUROSTIMULATOR, SACRAL - REMOVAL OF INTERSTIM WITH REPLACEMENT OF SACRAL NEUROMODULATION DEVICE;  Surgeon: Joe Noyola M.D.;  Location: [facility];  Service: General    MUSCLE BIOPSY Right 1/26/2017    Procedure: MUSCLE BIOPSY - THIGH;  Surgeon: Isidro Vigil M.D.;  Location: [facility];  Service:     GASTROSCOPY WITH BALLOON DILATATION N/A 1/4/2017    Procedure: GASTROSCOPY WITH DILATATION;  Surgeon: Torres Vargas M.D.;  Location: [facility];  Service:     BOWEL STIMULATOR INSERTION  7/13/2016    Procedure: BOWEL STIMULATOR INSERTION FOR PERMANENT INTERSTIM SACRAL IMPLANT;  Surgeon: Joe Noyola M.D.;  Location: [facility];  Service:     RECOVERY  1/27/2016    Procedure: CATH LAB EP STUDY WITH SINUS NODE MODIFICATION LA;  Surgeon: Recoveryonly Surgery;  [place] [facility]  Service:     OTHER CARDIAC SURGERY  1/2016    cardiac ablation    NEURO DEST FACET L/S W/IG SNGL  4/21/2015    Performed by Reza Tabor at SUR[facility]ERY [facility]    LUMBAR FUSION ANTERIOR  8/21/2012    Performed by SUSIE SAWANT at [facility]    ALYSSA BY LAPAROSCOPY  8/29/2010    Performed by SHAYY JOHNS at [facility]    LAMINOTOMY      OTHER ABDOMINAL SURGERY      TONSILLECTOMY      tonsillectomy     FAMILY HISTORY  Family History   Problem Relation  Age of Onset    Hypertension Mother     Sleep Apnea Mother     Heart Disease Mother     Other Mother         hypothryod    Hypertension Maternal Uncle     Heart Disease Maternal Grandmother     Hypertension Maternal Grandmother     No Known Problems Sister     Other Sister         Narcolepsy;fibromyalsia;bone;nerve    Genitourinary () Problems Sister         endometriosis     SOCIAL HISTORY   reports that she has never smoked. She has never used smokeless tobacco. She reports that she does not currently use drugs after having used the following drugs: Marijuana. Frequency: 7.00 times per week. She reports that she does not drink alcohol.    CURRENT MEDICATIONS  Discharge Medication List as of 4/26/2023 10:57 PM        CONTINUE these medications which have NOT CHANGED    Details   gabapentin (NEURONTIN) 300 MG Cap Take 1 Capsule by mouth 3 times a day., Disp-30 Capsule, R-2, Normal      ivabradine (CORLANOR) 7.5 MG Tab tablet Take 1 Tablet by mouth 2 times a day with meals., Disp-60 Tablet, R-2, Normal      methocarbamol (ROBAXIN) 500 MG Tab Take 1 tablet by mouth 3 times a day., Disp-90 Tablet, R-0, Normal      metoprolol SR (TOPROL XL) 25 MG TABLET SR 24 HR Take 1 Tablet by mouth every morning., Disp-30 Tablet, R-2, Normal      sodium chloride (SALT) 1 GM Tab Take 1 Tablet by mouth 3 times a day with meals., Disp-90 Tablet, R-2, Normal      traZODone (DESYREL) 100 MG Tab Take 1 tablet by mouth at bedtime., Disp-30 Tablet, R-2, Normal      ziprasidone (GEODON) 40 MG Cap Take 1 capsule by mouth at bedtime., Disp-30 Capsule, R-2, Normal      diphenhydrAMINE (BENADRYL) 25 MG capsule Take 50 mg by mouth at bedtime., Historical Med      Melatonin 10 MG Tab Take 10 mg by mouth at bedtime., Historical Med      etonogestrel (NEXPLANON) 68 MG Implant implant Inject 1 Each under the skin see administration instructions. Pt reports she is not sure when she had this medications injected last, pt reports she still has it in  "her arm  Every 3 years to change, Historical Med      albuterol 108 (90 Base) MCG/ACT Aero Soln inhalation aerosol Inhale 2 Puffs every 6 hours as needed for Shortness of Breath., Disp-8.5 g, R-6, Normal           ALLERGIES  Cefdinir, Depakote [divalproex sodium], Doxycycline, Montelukast [singulair], Vancomycin, Wound dressing adhesive, Amitriptyline, Aripiprazole [abilify], Clindamycin, Flomax [tamsulosin hydrochloride], Levaquin, Metformin, Tamsulosin, Tape, Amoxicillin, Depakote [divalproex sodium], Erythromycin, Hydromorphone, Ampicillin, Ciprofloxacin, Keflex, Levofloxacin, Metronidazole, Penicillins, Sulfa drugs, and Valproic acid    PHYSICAL EXAM  BP (!) 157/105   Pulse 98   Temp 37 °C (98.6 °F) (Temporal)   Resp 18   Ht 1.626 m (5' 4\")   Wt 105 kg (232 lb 5.8 oz)   SpO2 98%   BMI 39.89 kg/m²   Physical Exam  Vitals and nursing note reviewed.   Constitutional:       Comments: Increased bmi body habitus   HENT:      Head: Normocephalic and atraumatic.      Nose: No congestion.      Mouth/Throat:      Mouth: Mucous membranes are moist.   Musculoskeletal:      Comments: Wearing a brace at left wrist.  Hands sweaty. Forearms mildly plethoric with capillary refill 2-3 seconds. No pitting edema. Strong palpable pulses.    Neurological:      Mental Status: She is alert.      DIAGNOSTIC STUDIES    Labs:   Results for orders placed or performed during the hospital encounter of 04/26/23   CBC WITH DIFFERENTIAL   Result Value Ref Range    WBC 7.1 4.8 - 10.8 K/uL    RBC 4.86 4.20 - 5.40 M/uL    Hemoglobin 14.6 12.0 - 16.0 g/dL    Hematocrit 42.3 37.0 - 47.0 %    MCV 87.0 81.4 - 97.8 fL    MCH 30.0 27.0 - 33.0 pg    MCHC 34.5 33.6 - 35.0 g/dL    RDW 39.2 35.9 - 50.0 fL    Platelet Count 176 164 - 446 K/uL    MPV 11.1 9.0 - 12.9 fL    Neutrophils-Polys 56.60 44.00 - 72.00 %    Lymphocytes 32.90 22.00 - 41.00 %    Monocytes 8.10 0.00 - 13.40 %    Eosinophils 1.60 0.00 - 6.90 %    Basophils 0.40 0.00 - 1.80 %    " Immature Granulocytes 0.40 0.00 - 0.90 %    Nucleated RBC 0.00 /100 WBC    Neutrophils (Absolute) 3.99 2.00 - 7.15 K/uL    Lymphs (Absolute) 2.32 1.00 - 4.80 K/uL    Monos (Absolute) 0.57 0.00 - 0.85 K/uL    Eos (Absolute) 0.11 0.00 - 0.51 K/uL    Baso (Absolute) 0.03 0.00 - 0.12 K/uL    Immature Granulocytes (abs) 0.03 0.00 - 0.11 K/uL    NRBC (Absolute) 0.00 K/uL   BASIC METABOLIC PANEL   Result Value Ref Range    Sodium 143 135 - 145 mmol/L    Potassium 3.9 3.6 - 5.5 mmol/L    Chloride 111 96 - 112 mmol/L    Co2 18 (L) 20 - 33 mmol/L    Glucose 92 65 - 99 mg/dL    Bun 14 8 - 22 mg/dL    Creatinine 0.71 0.50 - 1.40 mg/dL    Calcium 9.9 8.5 - 10.5 mg/dL    Anion Gap 14.0 7.0 - 16.0   TROPONIN   Result Value Ref Range    Troponin T <6 6 - 19 ng/L   D-DIMER   Result Value Ref Range    D-Dimer <0.27 0.00 - 0.50 ug/mL (FEU)   proBrain Natriuretic Peptide, NT   Result Value Ref Range    NT-proBNP 81 0 - 125 pg/mL   ESTIMATED GFR   Result Value Ref Range    GFR (CKD-EPI) 115 >60 mL/min/1.73 m 2   EKG (NOW)   Result Value Ref Range    Report       [facility] Emergency Dept.    Test Date:  2023  Pt Name:    HARLEY DE LA CRUZ              Department: ER  MRN:        [id number]                      Room:       [facility]  Gender:     Female                       Technician: [id number]  :        1990                   Requested By:ER TRIAGE PROTOCOL  Order #:    [id number]                    Reading MD: Tyrell Calderon II, MD    Measurements  Intervals                                Axis  Rate:       86                           P:          25  LA:         140                          QRS:        12  QRSD:       92                           T:          19  QT:         382  QTc:        457    Interpretive Statements  Sinus rhythm  Rate normal at 86  Normal intervals  Borderline T abnormalities, anterior leads. Similar to previous.  No ST changes  Impression: Normal sinus rhythm ekg with chronic twave  flattening in anterior  lateral leads    Compared to ECG 04/10/2023 16:28:59  No significant changes  Electronically  Signed On 4- 18:15:47 PDT by Tyrell Calderon II, MD       *Note: Due to a large number of results and/or encounters for the requested time period, some results have not been displayed. A complete set of results can be found in Results Review.   Pertinent Labs & Imaging studies reviewed. (See MDM for details)    EKG:   I have independently interpreted this EKG as detailed below.    Radiology:   The attending emergency physician has independently interpreted the diagnostic imaging associated with this visit and am waiting the final reading from the radiologist.   Preliminary interpretation is a follows: No acute abnormalities.  Aorta appears normal.  No other thoracic abnormalities.  Radiologist interpretation:   CT-CTA COMPLETE THORACOABDOMINAL AORTA   Final Result         1.  No aortic aneurysm or dissection identified.   2.  Right nephrolithiasis without visualized obstructive changes              INITIAL ASSESSMENT, COURSE AND PLAN    COURSE & MEDICAL DECISION MAKING     ED Observation Status? Yes; I am placing the patient in to an observation status due to a diagnostic uncertainty as well as therapeutic intensity. Patient placed in observation status at 6:18 PM, 4/26/2023.     Observation plan is as follows: Serum studies, may need imaging, treatment for symptoms.    Upon Reevaluation, the patient's condition has: Improved; and will be discharged.    Patient discharged from ED Observation status at 10:56 PM (Time) 4/26/2023 (Date).     6:16 PM - Per protocol triage ordered for EKG prior to initial evaluation. Patient was evaluated at bedside.  This is an evaluation of a 32-year-old woman who has Annetta-Danlos syndrome, tachycardia arrhythmia, obesity, POTS who is now here with anterior chest pain that radiates to neck and extremities.  Unusual concern of hers is intermittent lips turning  blue, arms changing colors.  She has palpable pulses.  On my exam 1 unusual finding was that her palms and her arms were very sweaty, there was a somewhat plethoric wrist appearance to her forearms.  Difficult to tell if this is a chronic skin change or this is something acute.  Capillary refill is about 2 seconds.  Unclear what this could be.  She does have a history of Annetta-Danlos syndrome.  I need to consider there could be a vascular problem causing this.  We will also evaluate for ACS, electrolyte abnormalities, renal function, CHF.  Ordered for D-Dimer,  proBNP, CBC w/diff, BMP, and Troponin to evaluate. Patient verbalizes understanding and support with my plan of care.     8:29 PM - Patient was reevaluated at bedside. The patient states she is not currently experiencing chest pain, although she does feel very fatigued. Discussed lab results with the patient and informed them that they were within acceptable limits. As she has had many prior CT scans, we discussed additional considerations for CT studies, and through shared decision making decided to order CT-CTA Aorta RO w/ & w/o post process to evaluate.  I was very hesitant to order the study because she has had so many studies in the past.  However she has never had a dedicated imaging of her aorta despite reportedly having Annetta Danlos syndrome.  Because of her continued complaints of anterior chest pain and now these extremity symptoms I would like to further examine aorta.  If this is normal then no further work-up will be done.  She will need to follow-up with her outpatient providers.    10:37 PM - Preliminary review of CT-CTA Aorta performed by myself at this time as detailed above.    10:56 PM - Patient was reevaluated at bedside. Discussed lab and radiology results with the patient and informed them she has no acute abnormalities which would explain her symptoms. I discussed plan for discharge and follow up as outlined below. The patient is  stable for discharge at this time and will return for any new or worsening symptoms. Patient verbalizes understanding and support with my plan for discharge.           PROBLEM LIST  #Chest pain   -Unclear etiology or prognosis.  Skin and lip color changes, may be subjective.  Vitals have been stable here in the ER.  She had no neurologic changes.  Labs unremarkable.  No signs of cardiac ischemia.  No suggestion of thrombosis.  CT imaging dedicated to the aorta is unremarkable.  There was incidental finding of nephrolithiasis.  But there is no obstructing stones.  Reviewed results with her and she was agreeable with plan for outpatient follow-up, discharge.  We also discussed her numerous CT scans and hospital encounters.      DISPOSITION AND DISCUSSIONS  I have discussed management of the patient with the following physicians and ARIES's:  None    Discussion of management with other QHP or appropriate source(s): None     Escalation of care considered, and ultimately not performed: acute inpatient care management, however at this time, the patient is most appropriate for outpatient management.    Barriers to care at this time, including but not limited to:  None noted .     Decision tools and prescription drugs considered including, but not limited to: PERC rule unable to rule out PE so D-Dimer ordered, D-dimer negative, and HEART Score 2 .    The patient is referred to a primary physician for blood pressure management, diabetic screening, and for all other preventative health concerns.    DISPOSITION:  Patient will be discharged home in stable condition.    FOLLOW UP:  Torres Brody M.D.  [address]  [address]  [facility] [id number] [phone]    Schedule an appointment as soon as possible for a visit       FINAL DIANGOSIS  1. Chest pain, unspecified type Active      I, Natan Suggs (Carolibsadie), meggan scribing for, and in the presence of, Tyrell Calderon II, M.D.    Electronically signed by: Natan Suggs  (Scribe), 4/26/2023    Tyrell RODRIGUEZ II, M.D personally performed the services described in this documentation, as scribed by Natan Suggs in my presence, and it is both accurate and complete.      The note accurately reflects work and decisions made by me.  Tyrell Calderon II, M.D.  4/27/2023  12:59 AM

## 2023-04-27 NOTE — ED NOTES
Pt discharged to home. Discharge paperwork provided. Education provided by ERP.  Pt was given follow up instructions. Pt verbalized understanding of all instructions for discharge. Patient ambulatory, alert and oriented x 4, out of ER with all belongings and steady gait.

## 2023-04-27 NOTE — ED NOTES
Bedside report received from previous shift, SONYA Myers. Assumed patient care. Verified patient identification. Checked on bed, connected to monitor,  with unlabored respirations. Breathing spontaneously on room air not in any acute distress noted.  Denied any new complaints. Gurney in low position, side rail up for pt safety. Call light within reach. Will continue to monitor for any complications.

## 2023-05-01 ENCOUNTER — PATIENT MESSAGE (OUTPATIENT)
Dept: CARDIOLOGY | Facility: MEDICAL CENTER | Age: 33
End: 2023-05-01
Payer: MEDICARE

## 2023-05-03 NOTE — CARE PLAN
Problem: Bronchoconstriction:  Goal: Improve in air movement and diminished wheezing  Outcome: PROGRESSING AS EXPECTED   PRN tx given for SOB   normal

## 2023-05-08 ENCOUNTER — OFFICE VISIT (OUTPATIENT)
Dept: CARDIOLOGY | Facility: MEDICAL CENTER | Age: 33
End: 2023-05-08
Payer: MEDICARE

## 2023-05-08 VITALS
HEART RATE: 75 BPM | RESPIRATION RATE: 14 BRPM | WEIGHT: 235 LBS | SYSTOLIC BLOOD PRESSURE: 128 MMHG | DIASTOLIC BLOOD PRESSURE: 100 MMHG | OXYGEN SATURATION: 100 % | HEIGHT: 64 IN | BODY MASS INDEX: 40.12 KG/M2

## 2023-05-08 DIAGNOSIS — I47.11 INAPPROPRIATE SINUS TACHYCARDIA (HCC): ICD-10-CM

## 2023-05-08 DIAGNOSIS — G90.A POSTURAL ORTHOSTATIC TACHYCARDIA SYNDROME: ICD-10-CM

## 2023-05-08 DIAGNOSIS — R23.0 BLUISH SKIN DISCOLORATION: ICD-10-CM

## 2023-05-08 DIAGNOSIS — M35.7 HYPERMOBILITY SYNDROME: ICD-10-CM

## 2023-05-08 DIAGNOSIS — I47.10 SVT (SUPRAVENTRICULAR TACHYCARDIA) (HCC): ICD-10-CM

## 2023-05-08 DIAGNOSIS — R07.89 ATYPICAL CHEST PAIN: ICD-10-CM

## 2023-05-08 PROCEDURE — 99214 OFFICE O/P EST MOD 30 MIN: CPT | Performed by: INTERNAL MEDICINE

## 2023-05-08 RX ORDER — TIZANIDINE 4 MG/1
4 TABLET ORAL 2 TIMES DAILY
COMMUNITY
Start: 2023-04-14

## 2023-05-08 RX ORDER — SODIUM CHLORIDE 1 G/1
1 TABLET ORAL
Qty: 90 TABLET | Refills: 2 | Status: SHIPPED | OUTPATIENT
Start: 2023-05-08 | End: 2024-01-25

## 2023-05-08 RX ORDER — NAPROXEN 250 MG/1
TABLET ORAL
Status: ON HOLD | COMMUNITY
Start: 2023-03-14 | End: 2023-07-21

## 2023-05-08 ASSESSMENT — FIBROSIS 4 INDEX: FIB4 SCORE: 0.47

## 2023-05-08 NOTE — PROGRESS NOTES
Cardiology Follow Up Consultation Note    Date of note:    5/8/2023    Primary Care Provider: Torres Brody M.D.  Referring Provider: No ref. provider found    Patient Name: Kristin Balderrama   YOB: 1990  MRN:              [id number]    No chief complaint on file.      History of Present Illness: Ms. 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    Patient is known to Dr. Leon and was last seen back in Feb 2023. Since her last cardiology visit, patient has been experiencing blue discoloration of her skin.  States that she has noticed blue discoloration of her lips, arms and legs including hands and feet.  States that this tends to happen with exertion.  Has not really noticed any temporal association with cold weather or cold temperature.  Also denies any association with emotional stress.  She has noticed her hands and fingers turn bluish/purple and will last a while before resolving.    Patient was recently in the emergency department for further evaluation of these normalities.  During that visit, she also complained of left-sided sharp chest pain.  This chest pain has been a chronic issue for since 5724-2104.  Work-up in the ED including labs were unremarkable.  EKG did not show any acute ST-T wave changes suggestive of ischemia.  EKG shows sinus rhythm and nonspecific ST changes.  She had a CTA of the chest and aorta which was negative for aneurysm or dissection.    Since discharge, she occasionally notices skin discoloration.  Happens infrequently but is a relatively new finding over the past several months.  She denies having any prior  "history of autoimmune issues.  Never been diagnosed with Raynaud's phenomena.    Of note, patient was admitted to [facility] a few months ago with complaints of chest pain.  As part of cardiac work-up, she underwent a Lexiscan stress test which showed no evidence of ischemia.  The stress test was performed back in December 2022.    Cardiovascular Risk Factors:  1. Smoking status: Denies  2. Type II Diabetes Mellitus: Denies   Lab Results   Component Value Date/Time    HBA1C 5.1 02/01/2023 06:15 AM    HBA1C 5.1 01/20/2023 09:39 AM     3. Hypertension: Denies  4. Dyslipidemia: Denies   Cholesterol,Tot   Date Value Ref Range Status   01/20/2021 172 100 - 199 mg/dL Final     LDL   Date Value Ref Range Status   01/20/2021 98 <100 mg/dL Final     HDL   Date Value Ref Range Status   01/20/2021 48 >=40 mg/dL Final     Triglycerides   Date Value Ref Range Status   01/20/2021 130 0 - 149 mg/dL Final     5. Family history of early Coronary Artery Disease in a first degree relative (Male less than 55 years of age; Female less than 65 years of age): Denies      Review of Systems:  As per HPI. All other systems reviewed and are negative.      Past Medical History:   Diagnosis Date    Abdominal pain     Anginal syndrome     random chest pain especially with tachycardia    Apnea, sleep     Arrhythmia     \"sinus tachycardia\", cariologist, Dr. Kumar; ablation 2/2016    Arthritis     osteo    ASTHMA     when around smoke    Back pain     Borderline personality disorder (HCC)     Breath shortness     with tachycardia    Bronchitis 02/08/2022    Last time was 12/21    Cardiac arrhythmia     Chickenpox     Chronic UTI 09/18/2010    Cough     Daytime sleepiness     Dental disorder 03/08/2021    infected tooth    Depression     Diabetes (HCC)     Diarrhea     incontinece    Disorder of thyroid     Hashimoto's    Fall     Fatigue     Frequent headaches     Gasping for breath     Gynecological disorder     PCOS    Headache(784.0)  " "   Heart burn     Heart murmur     History of falling     Indigestion     Migraine     Mitochondrial disease (HCC)     Multiple personality disorder (HCC)     Nausea     Obesity     Other fatigue 06/29/2020    Pain 08/15/2012    back, 7/10    Painful joint     PCOS (polycystic ovarian syndrome)     Pneumonia 2012 12/2020    Psychosis ([facility])     Ringing in ears     Scoliosis     Shortness of breath     O2 as needed    Sinus tachycardia 10/31/2013    Sleep apnea     CPAP \"pulmonary doctor took me off mid year 2016\"    Snoring     Tonsillitis     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HCC)     2/8/22: Per pt: not anymore    Tuberculosis     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    Urinary bladder disorder     Suprapubic cath. 2/8/22: Not anymore.     Urinary incontinence     Weakness     Wears glasses          Past Surgical History:   Procedure Laterality Date    PB PERCUT FIX PBOX/NECK FEMUR FX Left 1/28/2023    Procedure: FIXATION, HIP, USING CANNULATED SCREW;  Surgeon: Noman Abdul M.D.;  Location: [facility];  Service: Orthopedics    RI LAP,DIAGNOSTIC ABDOMEN  2/14/2022    Procedure: LAPAROSCOPY;  Surgeon: Seamus Pisano M.D.;  Location: SURGERY SAME DAY [facility];  Service: Gynecology    OVARIAN CYSTECTOMY Right 2/14/2022    Procedure: EXCISION, CYST, OVARY;  Surgeon: Seamus Pisano M.D.;  Location: SURGERY SAME DAY [facility];  Service: Gynecology    BOWEL STIMULATOR INSERTION  3/10/2021    Procedure: INSERTION, ELECTRODE LEADS AND PULSE GENERATOR, NEUROSTIMULATOR, SACRAL - REMOVAL OF INTERSTIM WITH REPLACEMENT OF SACRAL NEUROMODULATION DEVICE;  Surgeon: Joe Noyola M.D.;  Location: [facility];  Service: General    MUSCLE BIOPSY Right 1/26/2017    Procedure: MUSCLE BIOPSY - THIGH;  Surgeon: Isidro Vigil M.D.;  Location: [facility];  Service:     GASTROSCOPY WITH BALLOON DILATATION N/A 1/4/2017    Procedure: GASTROSCOPY WITH DILATATION;  Surgeon: Torres Vargas M.D.;  Location: [facility];  Service:     BOWEL STIMULATOR INSERTION  7/13/2016    Procedure: BOWEL STIMULATOR INSERTION FOR PERMANENT INTERSTIM SACRAL IMPLANT;  Surgeon: Joe Noyola M.D.;  Location: SURGERY [facility];  Service:     RECOVERY  1/27/2016    Procedure: CATH LAB EP STUDY WITH SINUS NODE MODIFICATION ABHINAV;  Surgeon: Recoveryonly Surgery;  Location: SURGERY PRE-POST PROC UNIT [facility];  Service:     OTHER CARDIAC SURGERY  1/2016    cardiac ablation    NEURO DEST FACET L/S W/IG SNGL  4/21/2015    Performed by Reza Tabor at SURGERY [facility]    LUMBAR FUSION ANTERIOR  8/21/2012    Performed by SUSIE SAWANT at SURGERY [facility]S    ALYSSA BY LAPAROSCOPY  8/29/2010    Performed by SHAYY JOHNS at SURGERY [facility]S    LAMINOTOMY      OTHER ABDOMINAL SURGERY      TONSILLECTOMY      tonsillectomy         Current Outpatient Medications   Medication Sig Dispense Refill    tizanidine (ZANAFLEX) 2 MG tablet Take 2 mg by mouth 2 times a day.      naproxen (NAPROSYN) 250 MG Tab TAKE 1 TABLET BY MOUTH TWICE A DAY AS NEEDED FOR PAIN , JOINT PAIN, ANTI-INFLAMMATORY      sodium chloride (SALT) 1 GM Tab Take 1 Tablet by mouth 3 times a day with meals. 90 Tablet 2    ivabradine (CORLANOR) 7.5 MG Tab tablet Take 1 Tablet by mouth 2 times a day with meals. 60 Tablet 2    metoprolol SR (TOPROL XL) 25 MG TABLET SR 24 HR Take 1 Tablet by mouth every morning. 30 Tablet 2    ziprasidone (GEODON) 40 MG Cap Take 1 capsule by mouth at bedtime. 30 Capsule 2    diphenhydrAMINE (BENADRYL) 25 MG capsule Take 50 mg by mouth at bedtime.      Melatonin 10 MG Tab Take 10 mg by mouth at bedtime.      etonogestrel (NEXPLANON) 68 MG Implant implant Inject 1 Each under the skin see administration instructions. Pt reports she is not sure when she had this medications injected last, pt reports she still has it in her arm  Every 3 years to change      albuterol 108 (90 Base) MCG/ACT Aero Soln inhalation aerosol Inhale 2 Puffs  "every 6 hours as needed for Shortness of Breath. 8.5 g 6     No current facility-administered medications for this visit.         Allergies   Allergen Reactions    Cefdinir Shortness of Breath and Itching     Tolerated 1/18/17  Tolerates ceftriaxone  Tolerated augmentin 8/2019     Depakote [Divalproex Sodium] Unspecified     Muscle spasms/muscle pain and weakness      Doxycycline Anaphylaxis and Vomiting     Other reaction(s): pustules/blisters  Other reaction(s): stomach pain    Montelukast [Singulair] Unspecified     Cardiac effusion    Vancomycin Itching     Pt becomes flushed in face and chest.   RXN=7/10/16    Wound Dressing Adhesive Rash     By pt report-\"removes skin totally off\"    Amitriptyline Unspecified     Headaches      Aripiprazole [Abilify] Unspecified     Headaches/muscle twitching      Clindamycin Nausea         Other reaction(s): nausea stomach pain    Flomax [Tamsulosin Hydrochloride] Swelling    Levaquin Unspecified     Severe muscle cramps in legs  RXN=unknown  Other reaction(s): leg muscle cramps    Metformin Unspecified     Increased lactic acid     Other reaction(s): itching and rash/nausea vomiting    Tamsulosin Swelling     Swelling of legs    Tape Rash     Tears skin off  coban with Tegaderm tape ok intermittently  RXN=ongoing    Amoxicillin Rash    Depakote [Divalproex Sodium]     Erythromycin Rash     .  Other reaction(s): nausea stomach pain    Hydromorphone      Other reaction(s): vomiting    Ampicillin Rash     Pt reports that she received a rash     Ciprofloxacin Rash          Keflex Rash     Pt states she gets a rash with this medication  Tolerates ceftriaxone  Can take with Benadryl    Levofloxacin Unspecified     Leg muscle cramps    Metronidazole Rash     \"Vision problems\"  Other reaction(s): vision problems    Penicillins Hives     Can take with Benadryl    Sulfa Drugs Itching and Myalgia     Muscle pain and weakness  Other reaction(s): unknown    Valproic Acid Rash     . " "        Family History   Problem Relation Age of Onset    Hypertension Mother     Sleep Apnea Mother     Heart Disease Mother     Other Mother         hypothryod    Hypertension Maternal Uncle     Heart Disease Maternal Grandmother     Hypertension Maternal Grandmother     No Known Problems Sister     Other Sister         Narcolepsy;fibromyalsia;bone;nerve    Genitourinary () Problems Sister         endometriosis         Social History     Socioeconomic History    Marital status: Single     Spouse name: Not on file    Number of children: Not on file    Years of education: Not on file    Highest education level: Not on file   Occupational History    Not on file   Tobacco Use    Smoking status: Never    Smokeless tobacco: Never   Vaping Use    Vaping Use: Never used   Substance and Sexual Activity    Alcohol use: No     Alcohol/week: 0.0 oz    Drug use: Not Currently     Frequency: 7.0 times per week     Types: Marijuana    Sexual activity: Not Currently     Birth control/protection: Implant   Other Topics Concern    Not on file   Social History Narrative    ** Merged History Encounter **          Social Determinants of Health     Financial Resource Strain: Not on file   Food Insecurity: Not on file   Transportation Needs: No Transportation Needs    Lack of Transportation (Medical): No    Lack of Transportation (Non-Medical): No   Physical Activity: Not on file   Stress: Not on file   Social Connections: Not on file   Intimate Partner Violence: Not on file   Housing Stability: Not on file         Physical Exam:  Ambulatory Vitals  BP (!) 128/100 (BP Location: Left arm, Patient Position: Sitting, BP Cuff Size: Adult)   Pulse 75   Resp 14   Ht 1.626 m (5' 4\")   Wt 107 kg (235 lb)   SpO2 100%    Oxygen Therapy:  Pulse Oximetry: 100 %  BP Readings from Last 4 Encounters:   05/08/23 (!) 128/100   04/26/23 (!) 146/82   04/10/23 133/79   03/18/23 110/60       Weight/BMI: Body mass index is 40.34 kg/m².  Wt Readings " from Last 4 Encounters:   05/08/23 107 kg (235 lb)   04/26/23 105 kg (232 lb 5.8 oz)   04/25/23 104 kg (230 lb)   04/10/23 104 kg (229 lb 11.5 oz)         General: Not in acute distress, appears comfortable  Neck:  No carotid bruits, No JVD appreciated  CVS:  RRR, Normal S1, S2. No murmurs, rubs or gallops  Resp: Normal respiratory effort, lungs CTA bilaterally. No rales or rhonchi  Abdomen: Soft, non-distended, non-tender to palpation, no guarding or rigidity  Skin: No obvious rashes, no cyanosis or blue discoloration of skin. Good cap refill  Neurological: Alert and oriented x3, moves all extremities, no focal neurologic deficits  Psychiatric: Appropriate affect  Extremities:   Extremities warm, 2+ bilateral radial pulses.  2+ bilateral dp pulses, no lower extremity edema bilaterally, no obvious cyanosis or blue discoloration      Lab Data Review:  Lab Results   Component Value Date/Time    CHOLSTRLTOT 172 01/20/2021 05:33 AM    LDL 98 01/20/2021 05:33 AM    HDL 48 01/20/2021 05:33 AM    TRIGLYCERIDE 130 01/20/2021 05:33 AM       Lab Results   Component Value Date/Time    SODIUM 143 04/26/2023 07:22 PM    POTASSIUM 3.9 04/26/2023 07:22 PM    CHLORIDE 111 04/26/2023 07:22 PM    CO2 18 (L) 04/26/2023 07:22 PM    GLUCOSE 92 04/26/2023 07:22 PM    BUN 14 04/26/2023 07:22 PM    CREATININE 0.71 04/26/2023 07:22 PM    CREATININE 0.75 (L) 07/20/2010 11:00 AM    BUNCREATRAT 15.0 02/13/2023 02:31 PM    BUNCREATRAT 19 07/20/2010 11:00 AM    GLOMRATE >59 07/20/2010 11:00 AM     Lab Results   Component Value Date/Time    ALKPHOSPHAT 85 04/10/2023 06:54 PM    ASTSGOT 11 (L) 04/10/2023 06:54 PM    ALTSGPT 19 04/10/2023 06:54 PM    TBILIRUBIN 0.4 04/10/2023 06:54 PM      Lab Results   Component Value Date/Time    WBC 7.1 04/26/2023 07:22 PM    WBC 6.1 07/20/2010 11:00 AM     Lab Results   Component Value Date/Time    HBA1C 5.1 02/01/2023 06:15 AM    HBA1C 5.1 01/20/2023 09:39 AM         Cardiac Imaging and Procedures Review:     EKG dated 4/26/2023:   My personal interpretation is sinus rhythm, nonspecific ST-T wave changes.    Echo dated 12/26/2022:   Normal left ventricular systolic function.  The left ventricular ejection fraction is visually estimated to be 55%.  Normal right ventricular size and systolic function.  Right heart pressures are normal.  Trace mitral regurgitation    Cardiac Stress Test dated 12/27/2022:  1. Negative pharmacological stress test for ischemia or infarction.   2. Normal left ventricular systolic function with a calculated ejection fraction of 72 % with normal wall motion.   3. No chest pain and T wave inversion was noted with the infusion that resolved into recovery.      Assessment & Plan     1. Bluish skin discoloration  AI W/REFLEX IF POSITIVE    METHEMOGLOBIN      2. Atypical chest pain        3. Hypermobility syndrome        4. Postural orthostatic tachycardia syndrome  sodium chloride (SALT) 1 GM Tab      5. SVT (supraventricular tachycardia) (HCC)        6. Inappropriate sinus tachycardia              Medical Decision Making:  Ms. 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 Bluish skin discoloration  - Somewhat unusual history/presentation of transient/intermittent bluish skin discoloration. Etiology is unknown. ?Cyanosis. Her oxygen saturations have been within normal limits during her ED visits or in the office today. Unlikely to be due to hypoxia from lung disease. Clinically this is not typical for Raynaud's phenomenon given that its not triggered by cold or related to emotional stress. Nonetheless, we will send out labs for  autoimmune labs AI to rule out any autoimmune disease due to association with Raynauds. Check MetHgb. If labs are unrevealing, can consider workup of possible R to L shunt as possible etiology. However, unclear if she has associated hypoxia which would certainly be present if this was truly due to R to L intracardiac shunting. Recent echocardiogram without significant abnormalities.    2. Atypical chest pain  - Chest pain is very atypical in nature and unlikely to represent angina from obstructive CAD. She had recent stress test at [facility] in Dec 2022 which was normal without perfusion defects. As per the patient her chest pain has been a chronic issue since 5628-6550. ECG from recent ED admission reviewed and shows no acute ischemic changes. Continue to monitor.    3. Hypermobility syndrome  - No identified genetic mutation for EDS Recent CTA of chest does not show dilation/Aneurysm of aorta.     4. Postural orthostatic tachycardia syndrome  5. SVT (supraventricular tachycardia) (HCC)  6. Inappropriate sinus tachycardia  - Has been stable on ivabradine and metoprolol. Continue with current doses. Will refill Na salt tabs given history of POTS. Encourage hydration.    Patient will follow up with her primary cardiologist, Dr. Leon. It was a pleasure seeing Ms. Kristin Balderrama in the office today. Return in about 2 months (around 7/8/2023). Patient is aware to call the cardiology clinic with any questions or concerns.      Edmund Leonardo MD, [facility]  Cardiologist, [facility]  [place] [facility], [facility]  [address] [address]  Phone: [phone]  Fax: [phone]    Please note that this dictation was created using voice recognition software. I have made every reasonable attempt to correct obvious errors, but it is possible there are errors of grammar and possibly content that I did not discover before finalizing the note.

## 2023-06-06 ENCOUNTER — HOSPITAL ENCOUNTER (EMERGENCY)
Facility: MEDICAL CENTER | Age: 33
End: 2023-06-06
Attending: EMERGENCY MEDICINE
Payer: MEDICARE

## 2023-06-06 ENCOUNTER — OFFICE VISIT (OUTPATIENT)
Dept: URGENT CARE | Facility: CLINIC | Age: 33
End: 2023-06-06
Payer: MEDICARE

## 2023-06-06 VITALS
WEIGHT: 236.99 LBS | RESPIRATION RATE: 16 BRPM | OXYGEN SATURATION: 99 % | BODY MASS INDEX: 40.46 KG/M2 | TEMPERATURE: 97.7 F | DIASTOLIC BLOOD PRESSURE: 67 MMHG | SYSTOLIC BLOOD PRESSURE: 127 MMHG | HEART RATE: 65 BPM | HEIGHT: 64 IN

## 2023-06-06 VITALS
TEMPERATURE: 97.6 F | HEART RATE: 77 BPM | OXYGEN SATURATION: 98 % | SYSTOLIC BLOOD PRESSURE: 124 MMHG | HEIGHT: 64 IN | WEIGHT: 237 LBS | BODY MASS INDEX: 40.46 KG/M2 | DIASTOLIC BLOOD PRESSURE: 82 MMHG | RESPIRATION RATE: 16 BRPM

## 2023-06-06 DIAGNOSIS — L02.91 ABSCESS: ICD-10-CM

## 2023-06-06 DIAGNOSIS — L03.90 CELLULITIS, UNSPECIFIED CELLULITIS SITE: ICD-10-CM

## 2023-06-06 LAB
ALBUMIN SERPL BCP-MCNC: 4.3 G/DL (ref 3.2–4.9)
ALBUMIN/GLOB SERPL: 2 G/DL
ALP SERPL-CCNC: 70 U/L (ref 30–99)
ALT SERPL-CCNC: 22 U/L (ref 2–50)
ANION GAP SERPL CALC-SCNC: 12 MMOL/L (ref 7–16)
AST SERPL-CCNC: 16 U/L (ref 12–45)
BASOPHILS # BLD AUTO: 0.7 % (ref 0–1.8)
BASOPHILS # BLD: 0.04 K/UL (ref 0–0.12)
BILIRUB SERPL-MCNC: 0.5 MG/DL (ref 0.1–1.5)
BUN SERPL-MCNC: 20 MG/DL (ref 8–22)
CALCIUM ALBUM COR SERPL-MCNC: 9 MG/DL (ref 8.5–10.5)
CALCIUM SERPL-MCNC: 9.2 MG/DL (ref 8.5–10.5)
CHLORIDE SERPL-SCNC: 108 MMOL/L (ref 96–112)
CO2 SERPL-SCNC: 18 MMOL/L (ref 20–33)
CREAT SERPL-MCNC: 0.69 MG/DL (ref 0.5–1.4)
EOSINOPHIL # BLD AUTO: 0.15 K/UL (ref 0–0.51)
EOSINOPHIL NFR BLD: 2.7 % (ref 0–6.9)
ERYTHROCYTE [DISTWIDTH] IN BLOOD BY AUTOMATED COUNT: 41.6 FL (ref 35.9–50)
GFR SERPLBLD CREATININE-BSD FMLA CKD-EPI: 117 ML/MIN/1.73 M 2
GLOBULIN SER CALC-MCNC: 2.1 G/DL (ref 1.9–3.5)
GLUCOSE SERPL-MCNC: 99 MG/DL (ref 65–99)
HCG SERPL QL: NEGATIVE
HCT VFR BLD AUTO: 39.9 % (ref 37–47)
HGB BLD-MCNC: 13.8 G/DL (ref 12–16)
IMM GRANULOCYTES # BLD AUTO: 0.02 K/UL (ref 0–0.11)
IMM GRANULOCYTES NFR BLD AUTO: 0.4 % (ref 0–0.9)
LACTATE SERPL-SCNC: 1.1 MMOL/L (ref 0.5–2)
LYMPHOCYTES # BLD AUTO: 1.42 K/UL (ref 1–4.8)
LYMPHOCYTES NFR BLD: 26 % (ref 22–41)
MCH RBC QN AUTO: 30.7 PG (ref 27–33)
MCHC RBC AUTO-ENTMCNC: 34.6 G/DL (ref 32.2–35.5)
MCV RBC AUTO: 88.7 FL (ref 81.4–97.8)
MONOCYTES # BLD AUTO: 0.4 K/UL (ref 0–0.85)
MONOCYTES NFR BLD AUTO: 7.3 % (ref 0–13.4)
NEUTROPHILS # BLD AUTO: 3.43 K/UL (ref 1.82–7.42)
NEUTROPHILS NFR BLD: 62.9 % (ref 44–72)
NRBC # BLD AUTO: 0 K/UL
NRBC BLD-RTO: 0 /100 WBC (ref 0–0.2)
PLATELET # BLD AUTO: 161 K/UL (ref 164–446)
PMV BLD AUTO: 11.2 FL (ref 9–12.9)
POTASSIUM SERPL-SCNC: 4.3 MMOL/L (ref 3.6–5.5)
PROT SERPL-MCNC: 6.4 G/DL (ref 6–8.2)
RBC # BLD AUTO: 4.5 M/UL (ref 4.2–5.4)
SODIUM SERPL-SCNC: 138 MMOL/L (ref 135–145)
WBC # BLD AUTO: 5.5 K/UL (ref 4.8–10.8)

## 2023-06-06 PROCEDURE — 700102 HCHG RX REV CODE 250 W/ 637 OVERRIDE(OP): Mod: UD | Performed by: EMERGENCY MEDICINE

## 2023-06-06 PROCEDURE — 96375 TX/PRO/DX INJ NEW DRUG ADDON: CPT | Mod: XU

## 2023-06-06 PROCEDURE — 3079F DIAST BP 80-89 MM HG: CPT | Performed by: NURSE PRACTITIONER

## 2023-06-06 PROCEDURE — 700101 HCHG RX REV CODE 250: Mod: UD | Performed by: EMERGENCY MEDICINE

## 2023-06-06 PROCEDURE — 3074F SYST BP LT 130 MM HG: CPT | Performed by: NURSE PRACTITIONER

## 2023-06-06 PROCEDURE — 96374 THER/PROPH/DIAG INJ IV PUSH: CPT | Mod: XU

## 2023-06-06 PROCEDURE — 303977 HCHG I & D

## 2023-06-06 PROCEDURE — 700105 HCHG RX REV CODE 258: Mod: UD | Performed by: EMERGENCY MEDICINE

## 2023-06-06 PROCEDURE — 85025 COMPLETE CBC W/AUTO DIFF WBC: CPT

## 2023-06-06 PROCEDURE — 87040 BLOOD CULTURE FOR BACTERIA: CPT | Mod: 91

## 2023-06-06 PROCEDURE — 700111 HCHG RX REV CODE 636 W/ 250 OVERRIDE (IP): Mod: UD | Performed by: EMERGENCY MEDICINE

## 2023-06-06 PROCEDURE — 36415 COLL VENOUS BLD VENIPUNCTURE: CPT

## 2023-06-06 PROCEDURE — 83605 ASSAY OF LACTIC ACID: CPT

## 2023-06-06 PROCEDURE — A9270 NON-COVERED ITEM OR SERVICE: HCPCS | Mod: UD | Performed by: EMERGENCY MEDICINE

## 2023-06-06 PROCEDURE — 99214 OFFICE O/P EST MOD 30 MIN: CPT | Performed by: NURSE PRACTITIONER

## 2023-06-06 PROCEDURE — 99285 EMERGENCY DEPT VISIT HI MDM: CPT

## 2023-06-06 PROCEDURE — 80053 COMPREHEN METABOLIC PANEL: CPT

## 2023-06-06 PROCEDURE — 84703 CHORIONIC GONADOTROPIN ASSAY: CPT

## 2023-06-06 RX ORDER — ONDANSETRON 2 MG/ML
4 INJECTION INTRAMUSCULAR; INTRAVENOUS ONCE
Status: COMPLETED | OUTPATIENT
Start: 2023-06-06 | End: 2023-06-06

## 2023-06-06 RX ORDER — LIDOCAINE HYDROCHLORIDE AND EPINEPHRINE BITARTRATE 20; .01 MG/ML; MG/ML
10 INJECTION, SOLUTION SUBCUTANEOUS ONCE
Status: COMPLETED | OUTPATIENT
Start: 2023-06-06 | End: 2023-06-06

## 2023-06-06 RX ORDER — CLINDAMYCIN HYDROCHLORIDE 300 MG/1
300 CAPSULE ORAL 3 TIMES DAILY
Qty: 21 CAPSULE | Refills: 0 | Status: ACTIVE | OUTPATIENT
Start: 2023-06-06 | End: 2023-06-13

## 2023-06-06 RX ORDER — CLINDAMYCIN HYDROCHLORIDE 150 MG/1
450 CAPSULE ORAL ONCE
Status: COMPLETED | OUTPATIENT
Start: 2023-06-06 | End: 2023-06-06

## 2023-06-06 RX ORDER — CLINDAMYCIN PHOSPHATE 600 MG/50ML
600 INJECTION, SOLUTION INTRAVENOUS ONCE
Status: DISCONTINUED | OUTPATIENT
Start: 2023-06-06 | End: 2023-06-06

## 2023-06-06 RX ORDER — SODIUM CHLORIDE, SODIUM LACTATE, POTASSIUM CHLORIDE, AND CALCIUM CHLORIDE .6; .31; .03; .02 G/100ML; G/100ML; G/100ML; G/100ML
30 INJECTION, SOLUTION INTRAVENOUS ONCE
Status: COMPLETED | OUTPATIENT
Start: 2023-06-06 | End: 2023-06-06

## 2023-06-06 RX ORDER — DIPHENHYDRAMINE HYDROCHLORIDE 50 MG/ML
25 INJECTION INTRAMUSCULAR; INTRAVENOUS ONCE
Status: COMPLETED | OUTPATIENT
Start: 2023-06-06 | End: 2023-06-06

## 2023-06-06 RX ADMIN — DIPHENHYDRAMINE HYDROCHLORIDE 25 MG: 50 INJECTION, SOLUTION INTRAMUSCULAR; INTRAVENOUS at 10:25

## 2023-06-06 RX ADMIN — CLINDAMYCIN HYDROCHLORIDE 450 MG: 150 CAPSULE ORAL at 10:48

## 2023-06-06 RX ADMIN — ONDANSETRON 4 MG: 2 INJECTION INTRAMUSCULAR; INTRAVENOUS at 10:25

## 2023-06-06 RX ADMIN — LIDOCAINE HYDROCHLORIDE AND EPINEPHRINE 10 ML: 20; 10 INJECTION, SOLUTION INFILTRATION; PERINEURAL at 12:15

## 2023-06-06 RX ADMIN — SODIUM CHLORIDE, POTASSIUM CHLORIDE, SODIUM LACTATE AND CALCIUM CHLORIDE 1641 ML: 600; 310; 30; 20 INJECTION, SOLUTION INTRAVENOUS at 10:25

## 2023-06-06 ASSESSMENT — FIBROSIS 4 INDEX
FIB4 SCORE: 0.47
FIB4 SCORE: 0.47

## 2023-06-06 NOTE — PROGRESS NOTES
Chief Complaint   Patient presents with    Abscess     3 abscess in vaginal area        HISTORY OF PRESENT ILLNESS: Patient is a pleasant 32 y.o. female who presents to urgent care today with complaints of vaginal abscesses.  Patient notes she has 3 abscesses to her vaginal area.  Endorses associated fever, chills, lightheadedness, malaise, nausea, diaphoresis.  She does not know how long the lesions have been there but feel they have worsened over the last few days. Patient notes she has a history of type 2 diabetes and sepsis with multiple allergy complaints.  She has not tried any medication for symptom relief.    Patient Active Problem List    Diagnosis Date Noted    Thrombocytopenia ([facility]) 02/03/2023    S/p left hip fracture 01/31/2023    Postural orthostatic tachycardia syndrome 01/28/2023    Hip fracture, left, closed, initial encounter ([facility]) 01/27/2023    Moderate asthma 01/27/2023    Hypermobility syndrome 11/10/2022    Migraine, hemiplegic 07/27/2022    IBS (irritable bowel syndrome) 07/27/2022    Hemiplegic 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, not intractable 07/27/2022    Hidradenitis axillaris 07/10/2022    Vision changes 06/28/2022    Palpitations 05/13/2022    Inappropriate sinus tachycardia 09/24/2021    Psychogenic disorder 06/24/2021    Diplopia 06/23/2021    Failure to thrive in adult 06/21/2021    Acute bilateral low back pain with bilateral sciatica 06/16/2021    Normal pressure hydrocephalus (HCC) 06/04/2021    GIB (gastrointestinal bleeding) 05/24/2021    Asthma exacerbation 04/15/2021    Blood per rectum 04/15/2021    Bilateral hand pain 04/05/2021    Suprapubic catheter dysfunction ([facility]) 02/05/2021    UTI (urinary tract infection) 10/11/2020    Seizures ([facility]) 09/04/2020    Dry eyes 08/18/2020    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    Schizoaffective disorder, depressive type ([facility]) 03/02/2020    PTSD (post-traumatic stress disorder) 03/02/2020    Intracranial hypertension 02/27/2020    Mixed  stress and urge urinary incontinence 12/27/2019    Mild intermittent asthma without complication 12/16/2019    Immunocompromised ([facility]) 11/06/2019    Metabolic acidosis 08/30/2019    Moderate persistent asthma with acute exacerbation 08/14/2019    Optic neuritis 05/27/2019    SVT (supraventricular tachycardia) ([facility]) 04/10/2019    Muscular deconditioning 07/14/2018    TONYA (obstructive sleep apnea) 01/09/2018    Functional diarrhea 01/05/2018    Depression 10/28/2016    Schizophrenia ([facility]) 10/27/2016    Full incontinence of feces 07/13/2016    Polypharmacy 06/27/2016    Vitamin D deficiency 05/21/2016    Lactic acidosis 04/19/2016    Morbidly obese ([facility]) 03/07/2016    Scoliosis 03/07/2016    GERD (gastroesophageal reflux disease) 03/07/2016    Peripheral neuropathy and chronic pain syndrome ([facility]) 03/06/2016    Atypical chest pain 08/06/2015    Fatty liver disease, nonalcoholic 01/19/2015    Anxiety 12/16/2014    Hyperglycemia 09/05/2014    Urinary retention 04/02/2011    Dissociative identity disorder ([facility]) 04/02/2011    Borderline personality disorder in adult ([facility]) 09/18/2010    AP (abdominal pain) 09/18/2010       Allergies:Cefdinir, Depakote [divalproex sodium], Doxycycline, Montelukast [singulair], Vancomycin, Wound dressing adhesive, Amitriptyline, Aripiprazole, Aripiprazole [abilify], Clindamycin, Flomax [tamsulosin hydrochloride], Levaquin, Metformin, Tamsulosin, Tape, Amoxicillin, Depakote [divalproex sodium], Erythromycin, Hydromorphone, Ampicillin, Ciprofloxacin, Keflex, Levofloxacin, Metronidazole, Penicillins, Sulfa drugs, and Valproic acid    Current Outpatient Medications Ordered in Epic   Medication Sig Dispense Refill    tizanidine (ZANAFLEX) 2 MG tablet Take 2 mg by mouth 2 times a day.      naproxen (NAPROSYN) 250 MG Tab TAKE 1 TABLET BY MOUTH TWICE A DAY AS NEEDED FOR PAIN , JOINT PAIN, ANTI-INFLAMMATORY      sodium chloride (SALT) 1 GM Tab Take 1 Tablet by mouth 3 times a day with meals. 90  "Tablet 2    ivabradine (CORLANOR) 7.5 MG Tab tablet Take 1 Tablet by mouth 2 times a day with meals. 60 Tablet 2    metoprolol SR (TOPROL XL) 25 MG TABLET SR 24 HR Take 1 Tablet by mouth every morning. 30 Tablet 2    ziprasidone (GEODON) 40 MG Cap Take 1 capsule by mouth at bedtime. 30 Capsule 2    diphenhydrAMINE (BENADRYL) 25 MG capsule Take 50 mg by mouth at bedtime.      Melatonin 10 MG Tab Take 10 mg by mouth at bedtime.      etonogestrel (NEXPLANON) 68 MG Implant implant Inject 1 Each under the skin see administration instructions. Pt reports she is not sure when she had this medications injected last, pt reports she still has it in her arm  Every 3 years to change      albuterol 108 (90 Base) MCG/ACT Aero Soln inhalation aerosol Inhale 2 Puffs every 6 hours as needed for Shortness of Breath. 8.5 g 6     No current Epic-ordered facility-administered medications on file.       Past Medical History:   Diagnosis Date    Abdominal pain     Anginal syndrome     random chest pain especially with tachycardia    Apnea, sleep     Arrhythmia     \"sinus tachycardia\", cariologist, Dr. uKmar; ablation 2/2016    Arthritis     osteo    ASTHMA     when around smoke    Back pain     Borderline personality disorder (HCC)     Breath shortness     with tachycardia    Bronchitis 02/08/2022    Last time was 12/21    Cardiac arrhythmia     Chickenpox     Chronic UTI 09/18/2010    Cough     Daytime sleepiness     Dental disorder 03/08/2021    infected tooth    Depression     Diabetes (HCC)     Diarrhea     incontinece    Disorder of thyroid     Hashimoto's    Fall     Fatigue     Frequent headaches     Gasping for breath     Gynecological disorder     PCOS    Headache(784.0)     Heart burn     Heart murmur     History of falling     Indigestion     Migraine     Mitochondrial disease (HCC)     Multiple personality disorder (HCC)     Nausea     Obesity     Other fatigue 06/29/2020    Pain 08/15/2012    back, 7/10    Painful joint     " "PCOS (polycystic ovarian syndrome)     Pneumonia 2012 12/2020    Psychosis (HCC)     Ringing in ears     Scoliosis     Shortness of breath     O2 as needed    Sinus tachycardia 10/31/2013    Sleep apnea     CPAP \"pulmonary doctor took me off mid year 2016\"    Snoring     Tonsillitis     Transverse myelitis (HCC)     2/8/22: Per pt: not anymore    Tuberculosis     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    Urinary bladder disorder     Suprapubic cath. 2/8/22: Not anymore.     Urinary incontinence     Weakness     Wears glasses        Social History     Tobacco Use    Smoking status: Never    Smokeless tobacco: Never   Vaping Use    Vaping Use: Never used   Substance Use Topics    Alcohol use: No     Alcohol/week: 0.0 oz    Drug use: Not Currently     Frequency: 7.0 times per week     Types: Marijuana       Family Status   Relation Name Status    Mo thyroid disease,IBS Alive        44 2016    MUnc  Alive    MGMo  Alive    Fa  Alive        bio father hx unknown    Sis  Alive    Sis  Alive     Family History   Problem Relation Age of Onset    Hypertension Mother     Sleep Apnea Mother     Heart Disease Mother     Other Mother         hypothryod    Hypertension Maternal Uncle     Heart Disease Maternal Grandmother     Hypertension Maternal Grandmother     No Known Problems Sister     Other Sister         Narcolepsy;fibromyalsia;bone;nerve    Genitourinary () Problems Sister         endometriosis       ROS:  Review of Systems   Constitutional: Positive for fever, chills, diaphoresis, malaise, and fatigue.   HENT: Negative for ear pain, nosebleeds, congestion, sore throat and neck pain.    Eyes: Negative for vision changes.   Neuro: Positive for lightheadedness.  Negative for headache, sensory changes, weakness, seizure, LOC.   Cardiovascular: Negative for chest pain, palpitations, orthopnea and leg swelling.   Respiratory: Negative for cough, sputum production, shortness of breath and wheezing.   Gastrointestinal: " "Positive for nausea.  Negative for abdominal pain, vomiting or diarrhea.   Genitourinary: Negative for dysuria, urgency and frequency.  Musculoskeletal: Negative for falls, neck pain, back pain, joint pain, myalgias.   Skin: Positive for abscesses.  Negative for for rash.    Exam:  /82 (BP Location: Left arm, Patient Position: Sitting, BP Cuff Size: Adult)   Pulse 77   Temp 36.4 °C (97.6 °F) (Temporal)   Resp 16   Ht 1.626 m (5' 4\")   Wt 108 kg (237 lb)   SpO2 98%   General: well-nourished, well-developed female in NAD  Head: normocephalic, atraumatic  Eyes: PERRLA, no conjunctival injection, acuity grossly intact, lids normal.  Ears: normal shape and symmetry, no tenderness, no discharge. External canals are without any significant edema or erythema. Tympanic membranes are without any inflammation, no effusion. Gross auditory acuity is intact.  Nose: symmetrical without tenderness, no discharge.  Mouth/Throat: reasonable hygiene, no erythema, exudates or tonsillar enlargement.  Neck: no masses, range of motion within normal limits, no tracheal deviation. No obvious thyroid enlargement.   Lymph: no cervical adenopathy. No supraclavicular adenopathy.   Neuro: alert and oriented. Cranial nerves 1-12 grossly intact. No sensory deficit.   Cardiovascular: regular rate and rhythm. No edema.  Pulmonary: no distress. Chest is symmetrical with respiration, no wheezes, crackles, or rhonchi.   Musculoskeletal: no clubbing, appropriate muscle tone, gait is stable.  Skin: warm, dry, intact, no clubbing, no cyanosis, no rashes.  There were 3 abscess appearing lesions to suprapubic region, measuring approximately 1 to 2 cm each with underlying fluctuance and surrounding erythema.  Psych: appropriate mood, affect, judgement.         Assessment/Plan:  1. Abscess                The patient is a 32-year-old female with a complex medical history who presents with concerns of abscesses to suprapubic region.  Upon examination " the abscesses do not appear overly large but I am concerned about patient's complaints including fever, chills, malaise, lightheadedness, nausea.  In addition, concerned about patient's extensive antibiotic allergies. At this time, I feel the patient requires a higher level of care in the ED for closer monitoring, stat lab work and/or imaging for further evaluation. This has been discussed with the patient and she states agreement and understanding.  The patient is stable to leave POV at this time and will go directly to ED without delay.           Please note that this dictation was created using voice recognition software. I have made every reasonable attempt to correct obvious errors, but I expect that there are errors of grammar and possibly content that I did not discover before finalizing the note.      PER Mehta.

## 2023-06-06 NOTE — ED TRIAGE NOTES
".  Chief Complaint   Patient presents with    Sent from Urgent Care    Abscess     Groin abscesses for unknown amount of time. Reports fever, chills, lightheadedness, malaise, nausea, diaphoresis       31 yo female ambulatory to triage for above complaint.      Pt is alert and oriented, speaking in full sentences, follows commands and responds appropriately to questions.      Patient placed back in lobby and educated on triage process. Asked to inform RN of any changes.    BP (!) 141/103   Pulse 82   Temp 36.7 °C (98 °F) (Oral)   Resp 18   Ht 1.626 m (5' 4\")   Wt 107 kg (236 lb 15.9 oz)   LMP  (LMP Unknown)   SpO2 100%   BMI 40.68 kg/m²     "

## 2023-06-06 NOTE — ED PROVIDER NOTES
ED Provider Note    CHIEF COMPLAINT  Chief Complaint   Patient presents with    Sent from Urgent Care    Abscess     Groin abscesses for unknown amount of time. Reports fever, chills, lightheadedness, malaise, nausea, diaphoresis       EXTERNAL RECORDS REVIEWED  Outpatient Notes -patient was seen in urgent care earlier today for multiple reported vaginal abscesses.  3 abscesses in the suprapubic region were noted measuring approximately 1 to 2 cm each with underlying fluctuance and surrounding erythema.  The practitioner was concerned about her reports of fever, chills, malaise, lightheadedness, and nausea as well as her extensive antibiotic allergy list.  She was sent to the ED for closer monitoring, lab work, and imaging.    HPI/ROS  LIMITATION TO HISTORY   Select: : None  OUTSIDE HISTORIAN(S):  None    Kristin Balderrama is a 32 y.o. female who presents from urgent care for multiple abscesses in her suprapubic region that have been present for an unknown period of time.  She noted them earlier today because she developed pain in the groin region.  She has had multiple of these similar abscesses in the past.  She admits to tactile fever although has not taken her temperature, chills, general malaise, nausea, and some dizziness.  Because of the symptoms she was sent from urgent care to the ER for evaluation.  She also reports multiple antibiotic allergies.  She has been able to take clindamycin with Benadryl in the past.    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    SURGICAL HISTORY   has a past surgical history that includes neuro dest facet l/s w/ig sngl (4/21/2015); recovery (1/27/2016); [place] by laparoscopy (8/29/2010); lumbar fusion anterior (8/21/2012); other cardiac surgery (1/2016); tonsillectomy; bowel stimulator insertion (7/13/2016); gastroscopy with balloon dilatation (N/A, 1/4/2017); muscle biopsy (Right, 1/26/2017); other abdominal surgery; laminotomy; bowel stimulator insertion (3/10/2021); lap,diagnostic abdomen (2/14/2022); ovarian cystectomy (Right, 2/14/2022); and percut fix prox/neck femur fx (Left, 1/28/2023).    FAMILY HISTORY  Family History   Problem Relation Age of Onset    Hypertension Mother     Sleep Apnea Mother     Heart Disease Mother     Other Mother         hypothryod    Hypertension Maternal Uncle     Heart Disease Maternal Grandmother     Hypertension Maternal Grandmother     No Known Problems Sister     Other Sister         Narcolepsy;fibromyalsia;bone;nerve    Genitourinary () Problems Sister         endometriosis       SOCIAL HISTORY  Social History     Tobacco Use    Smoking status: Never    Smokeless tobacco: Never   Vaping Use    Vaping Use: Never used   Substance and Sexual Activity    Alcohol use: No     Alcohol/week: 0.0 oz    Drug use: Not Currently     Frequency: 7.0 times per week     Types: Marijuana    Sexual activity: Not Currently     Birth control/protection: Implant       CURRENT MEDICATIONS  Home  "Medications    **Home medications have not yet been reviewed for this encounter**         ALLERGIES  Allergies   Allergen Reactions    Cefdinir Shortness of Breath and Itching     Tolerated 1/18/17  Tolerates ceftriaxone  Tolerated augmentin 8/2019     Depakote [Divalproex Sodium] Unspecified     Muscle spasms/muscle pain and weakness      Doxycycline Anaphylaxis and Vomiting     Other reaction(s): pustules/blisters  Other reaction(s): stomach pain    Montelukast [Singulair] Unspecified     Cardiac effusion    Vancomycin Itching     Pt becomes flushed in face and chest.   RXN=7/10/16    Wound Dressing Adhesive Rash     By pt report-\"removes skin totally off\"    Amitriptyline Unspecified     Headaches      Aripiprazole Unspecified    Aripiprazole [Abilify] Unspecified     Headaches/muscle twitching      Clindamycin Nausea         Other reaction(s): nausea stomach pain    Flomax [Tamsulosin Hydrochloride] Swelling    Levaquin Unspecified     Severe muscle cramps in legs  RXN=unknown  Other reaction(s): leg muscle cramps    Metformin Unspecified     Increased lactic acid     Other reaction(s): itching and rash/nausea vomiting    Tamsulosin Swelling     Swelling of legs    Tape Rash     Tears skin off  coban with Tegaderm tape ok intermittently  RXN=ongoing    Amoxicillin Rash    Depakote [Divalproex Sodium]     Erythromycin Rash     .  Other reaction(s): nausea stomach pain    Hydromorphone      Other reaction(s): vomiting    Ampicillin Rash     Pt reports that she received a rash     Ciprofloxacin Rash          Keflex Rash     Pt states she gets a rash with this medication  Tolerates ceftriaxone  Can take with Benadryl    Levofloxacin Unspecified     Leg muscle cramps    Metronidazole Rash     \"Vision problems\"  Other reaction(s): vision problems    Penicillins Hives     Can take with Benadryl    Sulfa Drugs Itching and Myalgia     Muscle pain and weakness  Other reaction(s): unknown    Valproic Acid Rash     . " "      PHYSICAL EXAM  VITAL SIGNS: /83   Pulse 74   Temp 36.7 °C (98 °F) (Oral)   Resp 18   Ht 1.626 m (5' 4\")   Wt 107 kg (236 lb 15.9 oz)   LMP  (LMP Unknown)   SpO2 98%   BMI 40.68 kg/m²    Physical Exam  Vitals and nursing note reviewed.   Constitutional:       Appearance: Normal appearance.   HENT:      Head: Normocephalic and atraumatic.      Right Ear: External ear normal.      Left Ear: External ear normal.      Nose: Nose normal.      Mouth/Throat:      Mouth: Mucous membranes are moist.   Eyes:      Extraocular Movements: Extraocular movements intact.      Conjunctiva/sclera: Conjunctivae normal.      Pupils: Pupils are equal, round, and reactive to light.   Cardiovascular:      Rate and Rhythm: Normal rate and regular rhythm.   Pulmonary:      Effort: Pulmonary effort is normal.      Breath sounds: Normal breath sounds.   Abdominal:      Palpations: Abdomen is soft.      Tenderness: There is no abdominal tenderness.      Comments: Two very superficial abscesses over the mons pubis with some mild surrounding erythema and tenderness.  No crepitus or pain out of proportion to exam.   Musculoskeletal:         General: Normal range of motion.      Cervical back: Normal range of motion and neck supple.   Skin:     General: Skin is warm and dry.   Neurological:      General: No focal deficit present.      Mental Status: She is alert and oriented to person, place, and time.   Psychiatric:      Comments: Odd affect.       DIAGNOSTIC STUDIES / PROCEDURES  LABS  Results for orders placed or performed during the hospital encounter of 06/06/23   HCG QUAL SERUM   Result Value Ref Range    Beta-Hcg Qualitative Serum Negative Negative   CBC With Differential   Result Value Ref Range    WBC 5.5 4.8 - 10.8 K/uL    RBC 4.50 4.20 - 5.40 M/uL    Hemoglobin 13.8 12.0 - 16.0 g/dL    Hematocrit 39.9 37.0 - 47.0 %    MCV 88.7 81.4 - 97.8 fL    MCH 30.7 27.0 - 33.0 pg    MCHC 34.6 32.2 - 35.5 g/dL    RDW 41.6 35.9 - " 50.0 fL    Platelet Count 161 (L) 164 - 446 K/uL    MPV 11.2 9.0 - 12.9 fL    Neutrophils-Polys 62.90 44.00 - 72.00 %    Lymphocytes 26.00 22.00 - 41.00 %    Monocytes 7.30 0.00 - 13.40 %    Eosinophils 2.70 0.00 - 6.90 %    Basophils 0.70 0.00 - 1.80 %    Immature Granulocytes 0.40 0.00 - 0.90 %    Nucleated RBC 0.00 0.00 - 0.20 /100 WBC    Neutrophils (Absolute) 3.43 1.82 - 7.42 K/uL    Lymphs (Absolute) 1.42 1.00 - 4.80 K/uL    Monos (Absolute) 0.40 0.00 - 0.85 K/uL    Eos (Absolute) 0.15 0.00 - 0.51 K/uL    Baso (Absolute) 0.04 0.00 - 0.12 K/uL    Immature Granulocytes (abs) 0.02 0.00 - 0.11 K/uL    NRBC (Absolute) 0.00 K/uL   Comp Metabolic Panel   Result Value Ref Range    Sodium 138 135 - 145 mmol/L    Potassium 4.3 3.6 - 5.5 mmol/L    Chloride 108 96 - 112 mmol/L    Co2 18 (L) 20 - 33 mmol/L    Anion Gap 12.0 7.0 - 16.0    Glucose 99 65 - 99 mg/dL    Bun 20 8 - 22 mg/dL    Creatinine 0.69 0.50 - 1.40 mg/dL    Calcium 9.2 8.5 - 10.5 mg/dL    AST(SGOT) 16 12 - 45 U/L    ALT(SGPT) 22 2 - 50 U/L    Alkaline Phosphatase 70 30 - 99 U/L    Total Bilirubin 0.5 0.1 - 1.5 mg/dL    Albumin 4.3 3.2 - 4.9 g/dL    Total Protein 6.4 6.0 - 8.2 g/dL    Globulin 2.1 1.9 - 3.5 g/dL    A-G Ratio 2.0 g/dL   Lactic Acid   Result Value Ref Range    Lactic Acid 1.1 0.5 - 2.0 mmol/L   Blood Culture    Specimen: Peripheral; Blood   Result Value Ref Range    Significant Indicator NEG     Source BLD     Site PERIPHERAL     Culture Result       No Growth  Note: Blood cultures are incubated for 5 days and  are monitored continuously.Positive blood cultures  are called to the RN and reported as soon as  they are identified.     Blood Culture    Specimen: Peripheral; Blood   Result Value Ref Range    Significant Indicator NEG     Source BLD     Site PERIPHERAL     Culture Result       No Growth  Note: Blood cultures are incubated for 5 days and  are monitored continuously.Positive blood cultures  are called to the RN and reported as soon  as  they are identified.     CORRECTED CALCIUM   Result Value Ref Range    Correct Calcium 9.0 8.5 - 10.5 mg/dL   ESTIMATED GFR   Result Value Ref Range    GFR (CKD-EPI) 117 >60 mL/min/1.73 m 2     *Note: Due to a large number of results and/or encounters for the requested time period, some results have not been displayed. A complete set of results can be found in Results Review.     RADIOLOGY  Radiologist interpretation:   No orders to display     INCISION & DRAINAGE  Verbal consent was obtained. Patient was positioned appropriately. The area over the two tiny abscesses was cleansed with alcohol. Approximately 1cc of 2% lidocaine with epinephrine was injected into the center of each lesion. The skin over the lesions was nicked with an 11 blade scalpel and a tiny amount of purulent material was expressed from each lesion. The patient tolerated the procedure well and there were no immediate complications.    COURSE & MEDICAL DECISION MAKING    ED Observation Status? Yes; I am placing the patient in to an observation status due to a diagnostic uncertainty as well as therapeutic intensity. Patient placed in observation status at 9:25 AM, 6/6/2023.     Observation plan is as follows: Labs, I&D, reevaluation    Upon Reevaluation, the patient's condition has: Improved; and will be discharged.    Patient discharged from ED Observation status at 12:23 PM (Time) 6/6/2023 (Date).     INITIAL ASSESSMENT, COURSE AND PLAN  Care Narrative: This is a 32 year old female with extensive past medical and psychiatric history who was sent here from Urgent Care due to reports of multiple symptoms in the context of several abscesses over the mons pubis. Patient has had multiple abscesses in the area in the past which have required I&D. She has no idea how long these particular abscesses have been present but noticed them today when she had discomfort when touching the area. They are tiny areas of very superficial pus-filled pockets,  several millimeters in diameter. I numbed the area and nicked the skin over top of two lesions in order to facilitate drainage and tiny amounts of purulent material were expressed from each. I suspect these were areas of ingrown hairs that recently began to abscess. There was some mild erythema surrounding these lesions, and some other areas of erythema that look like they may become abscessed so she was given a dose of clindamycin which she reports she has taken in the past with benadryl without any side effects. Because of her reports of tactile fever, malaise, nausea, and vomiting, some basic labs were obtained. She has no leukocytosis or left shift. Metabolic panel is grossly normal. Lactate is normal. HCG is negative. Blood cultures were drawn and if suggest systemic infection patient will be called to return. She is overwhelmingly well appearing. No fever, tachycardia here. No episodes of emesis. No abdominal tenderness other than the tenderness over the areas that were drained. No indication for imaging. The areas that were I&D'd were dressed by nursing staff. She was given a prescription for clindamycin and will take it with benadryl. Follow up with PMD for wound recheck in 2-3 days. Discharged in good and stable condition with strict return precautions.    HYDRATION: Based on the patient's presentation of Dehydration the patient was given IV fluids. IV Hydration was used because oral hydration was not adequate alone. Upon recheck following hydration, the patient was improved.    ADDITIONAL PROBLEM LIST  None  DISPOSITION AND DISCUSSIONS  I have discussed management of the patient with the following physicians and ARIES's:  None    Discussion of management with other QHP or appropriate source(s): None     Escalation of care considered, and ultimately not performed:acute inpatient care management, however at this time, the patient is most appropriate for outpatient management    Barriers to care at this time,  including but not limited to:  None .     Decision tools and prescription drugs considered including, but not limited to: Antibiotics - clindamycin .    FINAL DIAGNOSIS  1. Abscess    2. Cellulitis, unspecified cellulitis site      Electronically signed by: Dino Morales M.D., 6/6/2023 9:25 AM

## 2023-06-06 NOTE — DISCHARGE INSTRUCTIONS
You were seen in the ER for several abscesses on your mons pubis.  There was were drained.  Thankfully your labs are reassuring.  I gave you a dose of clindamycin in the ER and a prescription for the same, take it as directed.  If you need to take it with Benadryl at home to prevent nausea that is acceptable.  Follow-up with your primary care physician this week for recheck.  Return to the ER with new or worsening symptoms.  I hope you feel better soon!

## 2023-06-07 ENCOUNTER — HOSPITAL ENCOUNTER (OUTPATIENT)
Dept: LAB | Facility: MEDICAL CENTER | Age: 33
End: 2023-06-07
Attending: SPECIALIST
Payer: MEDICARE

## 2023-06-07 ENCOUNTER — HOSPITAL ENCOUNTER (OUTPATIENT)
Dept: LAB | Facility: MEDICAL CENTER | Age: 33
End: 2023-06-07
Attending: INTERNAL MEDICINE
Payer: MEDICARE

## 2023-06-07 DIAGNOSIS — R23.0 BLUISH SKIN DISCOLORATION: ICD-10-CM

## 2023-06-08 ENCOUNTER — HOSPITAL ENCOUNTER (OUTPATIENT)
Dept: LAB | Facility: MEDICAL CENTER | Age: 33
End: 2023-06-08
Attending: SPECIALIST
Payer: MEDICARE

## 2023-06-08 ENCOUNTER — HOSPITAL ENCOUNTER (OUTPATIENT)
Dept: LAB | Facility: MEDICAL CENTER | Age: 33
End: 2023-06-08
Attending: INTERNAL MEDICINE
Payer: MEDICARE

## 2023-06-08 LAB — METHGB MFR BLD: 0.5 % (ref 0.4–1.5)

## 2023-06-08 PROCEDURE — 86038 ANTINUCLEAR ANTIBODIES: CPT

## 2023-06-08 PROCEDURE — 86480 TB TEST CELL IMMUN MEASURE: CPT

## 2023-06-08 PROCEDURE — 36415 COLL VENOUS BLD VENIPUNCTURE: CPT

## 2023-06-08 PROCEDURE — 83050 HGB METHEMOGLOBIN QUAN: CPT

## 2023-06-09 ENCOUNTER — APPOINTMENT (OUTPATIENT)
Dept: RADIOLOGY | Facility: MEDICAL CENTER | Age: 33
End: 2023-06-09
Attending: EMERGENCY MEDICINE
Payer: MEDICARE

## 2023-06-09 ENCOUNTER — HOSPITAL ENCOUNTER (EMERGENCY)
Facility: MEDICAL CENTER | Age: 33
End: 2023-06-09
Attending: EMERGENCY MEDICINE
Payer: MEDICARE

## 2023-06-09 VITALS
DIASTOLIC BLOOD PRESSURE: 86 MMHG | HEART RATE: 67 BPM | OXYGEN SATURATION: 97 % | RESPIRATION RATE: 16 BRPM | WEIGHT: 236 LBS | TEMPERATURE: 98.6 F | SYSTOLIC BLOOD PRESSURE: 138 MMHG | BODY MASS INDEX: 40.29 KG/M2 | HEIGHT: 64 IN

## 2023-06-09 DIAGNOSIS — M25.551 RIGHT HIP PAIN: Primary | ICD-10-CM
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
## 2023-06-09 LAB — EKG IMPRESSION: NORMAL

## 2023-06-09 PROCEDURE — 73700 CT LOWER EXTREMITY W/O DYE: CPT | Mod: RT

## 2023-06-09 PROCEDURE — 93005 ELECTROCARDIOGRAM TRACING: CPT | Performed by: EMERGENCY MEDICINE

## 2023-06-09 PROCEDURE — 99285 EMERGENCY DEPT VISIT HI MDM: CPT

## 2023-06-09 PROCEDURE — 71045 X-RAY EXAM CHEST 1 VIEW: CPT

## 2023-06-09 PROCEDURE — 72170 X-RAY EXAM OF PELVIS: CPT

## 2023-06-09 PROCEDURE — 73552 X-RAY EXAM OF FEMUR 2/>: CPT | Mod: RT

## 2023-06-09 RX ORDER — ACETAMINOPHEN 500 MG
1000 TABLET ORAL EVERY 6 HOURS PRN
Qty: 20 TABLET | Refills: 0 | Status: SHIPPED | OUTPATIENT
Start: 2023-06-09 | End: 2023-06-13

## 2023-06-09 RX ORDER — IBUPROFEN 800 MG/1
800 TABLET ORAL EVERY 8 HOURS PRN
Qty: 20 TABLET | Refills: 0 | Status: SHIPPED | OUTPATIENT
Start: 2023-06-09 | End: 2023-06-16

## 2023-06-09 ASSESSMENT — FIBROSIS 4 INDEX: FIB4 SCORE: 0.7

## 2023-06-09 NOTE — DISCHARGE INSTRUCTIONS
Thankfully your CT imaging was negative for any fractures.  I think you likely bruised your hip.  Please follow-up with the [facility] or Dr. Cantu of [facility] orthopedics for further evaluation treatment.  He can take Tylenol or Motrin for pain.  Use ice on the hip to help with swelling.  Come back if you have worsening symptoms.  Thank you for coming in today.

## 2023-06-09 NOTE — ED TRIAGE NOTES
"Chief Complaint   Patient presents with    Hip Pain     Right sided    Fall     \"My knee went out\"  No head strike, no loss of consciousness     BP (!) 148/72   Pulse 80   Temp 36.4 °C (97.6 °F) (Temporal)   Resp 18   Ht 1.626 m (5' 4\")   Wt 107 kg (236 lb)   LMP  (LMP Unknown)   SpO2 97%   BMI 40.51 kg/m²     BIB [facility] from home. Pt had a mechanical ground level fall. Reports that her \"knee went out,\" causing a fall. History of Annetta-Danlos Syndrome. Previous hip fracture. Right leg internally rotated. CMS intact.    100 mcg intranasal Fentanyl  "

## 2023-06-09 NOTE — ED PROVIDER NOTES
"ED Provider Note    Scribed for Abelardo Knight by Abelardo Knight. 6/9/2023  4:57 AM    Primary care provider: Torres Brody M.D.  Means of arrival: EMS  History obtained from: Patient  History limited by: None    CHIEF COMPLAINT  Chief Complaint   Patient presents with    Hip Pain     Right sided    Fall     \"My knee went out\"  No head strike, no loss of consciousness       EXTERNAL RECORDS REVIEWED  Outpatient Notes patient was seen at urgent care 2 days ago for abscess    HPI/ROS  LIMITATION TO HISTORY   Select: : None  OUTSIDE HISTORIAN(S):  EMS reviewed report    HPI  Kristin Balderrama is a 32 y.o. female who presents to the Emergency Department with a history of Annetta-Danlos hypermobility syndrome, recent hip fracture in January on the left, presenting with concern for hip fracture on the right.  Patient states that she was ambulating when she felt her right knee give out which happens with her hypermobility syndrome.  She fell onto her right hip.  Having significant pain.  Feels very similar to her left hip fracture.  EMS noted that her hip was internally rotated and shortened concerning for likely hip fracture.  Received opiate IV medications in route.  Patient denies any numbness or tingling but does have significant pain in that right hip.  Denies any her head or losing consciousness, not on any anticoagulation.  Patient noted to have significant and frequent high utilization of the Emergency Department with multiple CT scans in the past.      REVIEW OF SYSTEMS  As above, all other systems reviewed and are negative.   See HPI for further details.     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  SURGICAL HISTORY   has a past surgical history that includes neuro dest facet l/s w/ig sngl (4/21/2015); recovery (1/27/2016); delmar by laparoscopy (8/29/2010); lumbar fusion anterior (8/21/2012); other cardiac surgery (1/2016); tonsillectomy; bowel stimulator insertion (7/13/2016); gastroscopy with balloon dilatation (N/A, 1/4/2017); muscle biopsy (Right, 1/26/2017); other abdominal surgery; laminotomy; bowel stimulator insertion (3/10/2021); lap,diagnostic abdomen (2/14/2022); ovarian cystectomy (Right, 2/14/2022); and percut fix prox/neck femur fx (Left, 1/28/2023).  SOCIAL HISTORY  Social History     Tobacco Use    Smoking status: Never    Smokeless tobacco: Never   Vaping Use    Vaping Use: Never used   Substance Use Topics    Alcohol use: No     Alcohol/week: 0.0 oz    Drug use: Not Currently     Frequency: 7.0 times per week     Types: Marijuana      Social History     Substance and Sexual Activity   Drug Use Not Currently    Frequency: 7.0 times per week    Types: Marijuana     FAMILY HISTORY  Family History   Problem Relation Age of Onset    Hypertension Mother     Sleep Apnea Mother     Heart Disease Mother     Other Mother         hypothryod    Hypertension Maternal Uncle     Heart Disease Maternal Grandmother     Hypertension Maternal  "Grandmother     No Known Problems Sister     Other Sister         Narcolepsy;fibromyalsia;bone;nerve    Genitourinary () Problems Sister         endometriosis     CURRENT MEDICATIONS  Home Medications       Reviewed by Ellis Houser R.N. (Registered Nurse) on 06/09/23 at 0449  Med List Status: Not Addressed     Medication Last Dose Status   albuterol 108 (90 Base) MCG/ACT Aero Soln inhalation aerosol  Active   clindamycin (CLEOCIN) 300 MG Cap  Active   diphenhydrAMINE (BENADRYL) 25 MG capsule  Active   etonogestrel (NEXPLANON) 68 MG Implant implant  Active   ivabradine (CORLANOR) 7.5 MG Tab tablet  Active   Melatonin 10 MG Tab  Active   metoprolol SR (TOPROL XL) 25 MG TABLET SR 24 HR  Active   naproxen (NAPROSYN) 250 MG Tab  Active   sodium chloride (SALT) 1 GM Tab  Active   tizanidine (ZANAFLEX) 2 MG tablet  Active   ziprasidone (GEODON) 40 MG Cap  Active                  ALLERGIES  Allergies   Allergen Reactions    Cefdinir Shortness of Breath and Itching     Tolerated 1/18/17  Tolerates ceftriaxone  Tolerated augmentin 8/2019     Depakote [Divalproex Sodium] Unspecified     Muscle spasms/muscle pain and weakness      Doxycycline Anaphylaxis and Vomiting     Other reaction(s): pustules/blisters  Other reaction(s): stomach pain    Montelukast [Singulair] Unspecified     Cardiac effusion    Vancomycin Itching     Pt becomes flushed in face and chest.   RXN=7/10/16    Wound Dressing Adhesive Rash     By pt report-\"removes skin totally off\"    Amitriptyline Unspecified     Headaches      Aripiprazole Unspecified    Aripiprazole [Abilify] Unspecified     Headaches/muscle twitching      Clindamycin Nausea         Other reaction(s): nausea stomach pain    Flomax [Tamsulosin Hydrochloride] Swelling    Levaquin Unspecified     Severe muscle cramps in legs  RXN=unknown  Other reaction(s): leg muscle cramps    Metformin Unspecified     Increased lactic acid     Other reaction(s): itching and rash/nausea vomiting    " "Tamsulosin Swelling     Swelling of legs    Tape Rash     Tears skin off  coban with Tegaderm tape ok intermittently  RXN=ongoing    Amoxicillin Rash    Depakote [Divalproex Sodium]     Erythromycin Rash     .  Other reaction(s): nausea stomach pain    Hydromorphone      Other reaction(s): vomiting    Ampicillin Rash     Pt reports that she received a rash     Ciprofloxacin Rash          Keflex Rash     Pt states she gets a rash with this medication  Tolerates ceftriaxone  Can take with Benadryl    Levofloxacin Unspecified     Leg muscle cramps    Metronidazole Rash     \"Vision problems\"  Other reaction(s): vision problems    Penicillins Hives     Can take with Benadryl    Sulfa Drugs Itching and Myalgia     Muscle pain and weakness  Other reaction(s): unknown    Valproic Acid Rash     .       PHYSICAL EXAM    VITAL SIGNS:   Vitals:    06/09/23 0446 06/09/23 0447 06/09/23 0503 06/09/23 0602   BP:  (!) 148/72 (!) 154/79 130/70   Pulse:  80 78 76   Resp:  18 (!) 24 12   Temp:  36.4 °C (97.6 °F)     TempSrc:  Temporal     SpO2:  97% 98% 98%   Weight: 107 kg (236 lb)      Height: 1.626 m (5' 4\")        Vitals: My interpretation: normotensive, not tachycardic, afebrile, not hypoxic    Reinterpretation of vitals: Unchanged unremarkable    Cardiac Monitor Interpretation: The cardiac monitor revealed normal Sinus Rhythm as interpreted by me. The cardiac monitor was ordered secondary to the patient's history of likely hip fracture and to monitor for dysrhythmia and/or tachycardia.    PE:   Gen: sitting comfortably, speaking clearly, appears in no acute distress   ENT: Mucous membranes moist, posterior pharynx clear, uvula midline, nares patent bilaterally   Neck: Supple, FROM  Pulmonary: Lungs are clear to auscultation bilaterally. No tachypnea  CV:  RRR, no murmur appreciated, pulses 2+ in both upper and lower extremities  Abdomen: soft, NT/ND; no rebound/guarding  : no CVA or suprapubic tenderness   Neuro: A&Ox4 (person, " place, time, situation), speech fluent, gait steady, no focal deficits appreciated  Skin: No rash or lesions.  No pallor or jaundice.  No cyanosis.  Warm and dry.   Right hip: Right lower extremity is shortened internally rotated, significant pain in the right hip, no obvious deformity noted, neurovascular intact distally with strong pedal pulse posterior tibialis, limited range of motion secondary to pain in right hip    DIAGNOSTIC STUDIES / PROCEDURES    LABS  Results for orders placed or performed during the hospital encounter of 23   EKG (NOW)   Result Value Ref Range    Report       [facility] Emergency Dept.    Test Date:  2023  Pt Name:    HARLEY DE LA CRUZ              Department: ER  MRN:        [id number]                      Room:        15  Gender:     Female                       Technician: [id number]  :        [id number]                   Requested By:ABELARDO CHRIS  Order #:    [id number]                    Reading MD: Abelardo Chris    Measurements  Intervals                                Axis  Rate:       67                           P:          7  NY:         148                          QRS:        6  QRSD:       96                           T:          -3  QT:         412  QTc:        435    Interpretive Statements  Sinus rhythm  Borderline T abnormalities, anterior leads  Compared to ECG 2023 16:07:31  ST (T wave) deviation no longer present  T-wave abnormality still present  Electronically Signed On 2023 5:56:34 PDT by Abelardo Chris       *Note: Due to a large number of results and/or encounters for the requested time period, some results have not been displayed. A complete set of results can be found in Results Review.      RADIOLOGY  I have independently interpreted the diagnostic imaging associated with this visit and am waiting the final reading from the radiologist.   My preliminary interpretation is a follows: No obvious fracture or  deformity noted on x-ray of the pelvis.  Radiologist interpretation is as follows:  CT-HIP W/O PLUS RECONS RIGHT   Final Result         1.  No acute traumatic bony injury identified.      DX-PELVIS-1 OR 2 VIEWS   Final Result         1.  No acute traumatic bony injury.      DX-FEMUR-2+ RIGHT   Final Result         1.  No radiographic evidence of acute traumatic injury.      DX-CHEST-LIMITED (1 VIEW)   Final Result         1.  No acute cardiopulmonary disease.          COURSE & MEDICAL DECISION MAKING  Nursing notes, VS, PMSFHx, labs, imaging, EKG reviewed in chart.    Ddx: Strain, sprain, fracture, dislocation    MDM: 4:58 AM Kristin Balderrama is a 32 y.o. female who presented with concerns of possible right hip fracture.  Patient is hypermobility Annetta-Danlos syndrome.  Knee gave out tonight when she is ambulating she fell on her right hip.  Not hit her head or lose conscious.  EMS brought her in.  Upon arrival GCS of 15, vital signs unremarkable.  Physical exam shows internally rotated and shortened right lower extremity but neurovascular intact.  Concerns for hip fracture on the right.  Sent for x-rays, labs drawn, preoperative EKG ordered.  X-rays independently interpreted myself show no obvious fractures, radiology agrees.  On reevaluation patient is still having significant pain, unable to bear weight, states that previously she needed a CT scan to diagnose her hip fracture on the left.  CT scan ordered.  Preoperative EKG is negative for any ischemic change or abnormality.  Thankfully CT scan negative for any acute fractures.  Patient given crutches for likely hip contusion, instructed on ice, rest, elevation, Tylenol, Motrin.  Given follow-up with orthopedic surgery as needed if she still has further issues or questions.  Discharge from ED in stable condition with normal vital signs. RX for motrin and tylenol sent to pharmacy for pt.     ADDITIONAL PROBLEM LIST AND DISPOSITION    I have discussed  management of the patient with the following physicians and ARIES's: None    Discussion of management with other QHP or appropriate source(s): None     Escalation of care considered, and ultimately not performed:acute inpatient care management, however at this time, the patient is most appropriate for outpatient management    Barriers to care at this time, including but not limited to:  None .     Decision tools and prescription drugs considered including, but not limited to: Pain Medications tylenol and motrin .    FINAL IMPRESSION  1. Right hip pain Acute   2. Fall, initial encounter Acute      The note accurately reflects work and decisions made by me.  Abelardo Knight  6/9/2023  4:58 AM

## 2023-06-09 NOTE — ED NOTES
Pt back from CT on [facility]. Placed on monitor. Call light within reach.

## 2023-06-09 NOTE — ED NOTES
Pt attempting to contact grandmother about transport home. ED tech at bedside instructing on crutches use.

## 2023-06-09 NOTE — ED NOTES
Pt provided discharge instructions including where to pickup prescriptions. Pt verbalizes understanding and has no questions at this time. Pt ambulates from ED with steady gait using crutches. Pt maintains possession of all belongings.

## 2023-06-10 LAB
GAMMA INTERFERON BACKGROUND BLD IA-ACNC: 0.03 IU/ML
M TB IFN-G BLD-IMP: NEGATIVE
M TB IFN-G CD4+ BCKGRND COR BLD-ACNC: -0.01 IU/ML
MITOGEN IGNF BCKGRD COR BLD-ACNC: >10 IU/ML
NUCLEAR IGG SER QL IA: NORMAL
QFT TB2 - NIL TBQ2: -0.01 IU/ML

## 2023-06-16 ENCOUNTER — APPOINTMENT (OUTPATIENT)
Dept: RADIOLOGY | Facility: MEDICAL CENTER | Age: 33
End: 2023-06-16
Attending: EMERGENCY MEDICINE
Payer: MEDICARE

## 2023-06-16 ENCOUNTER — OFFICE VISIT (OUTPATIENT)
Dept: URGENT CARE | Facility: CLINIC | Age: 33
End: 2023-06-16
Payer: MEDICARE

## 2023-06-16 ENCOUNTER — HOSPITAL ENCOUNTER (EMERGENCY)
Facility: MEDICAL CENTER | Age: 33
End: 2023-06-16
Attending: EMERGENCY MEDICINE
Payer: MEDICARE

## 2023-06-16 VITALS
HEART RATE: 78 BPM | OXYGEN SATURATION: 96 % | TEMPERATURE: 97.6 F | HEIGHT: 64 IN | RESPIRATION RATE: 17 BRPM | SYSTOLIC BLOOD PRESSURE: 136 MMHG | DIASTOLIC BLOOD PRESSURE: 84 MMHG | BODY MASS INDEX: 40.51 KG/M2

## 2023-06-16 VITALS
DIASTOLIC BLOOD PRESSURE: 65 MMHG | OXYGEN SATURATION: 93 % | TEMPERATURE: 97.7 F | BODY MASS INDEX: 40.29 KG/M2 | SYSTOLIC BLOOD PRESSURE: 111 MMHG | HEART RATE: 73 BPM | WEIGHT: 236 LBS | HEIGHT: 64 IN | RESPIRATION RATE: 20 BRPM

## 2023-06-16 DIAGNOSIS — J45.901 EXACERBATION OF ASTHMA, UNSPECIFIED ASTHMA SEVERITY, UNSPECIFIED WHETHER PERSISTENT: ICD-10-CM

## 2023-06-16 DIAGNOSIS — J45.21 MILD INTERMITTENT ASTHMA WITH ACUTE EXACERBATION: ICD-10-CM

## 2023-06-16 DIAGNOSIS — J06.9 UPPER RESPIRATORY TRACT INFECTION, UNSPECIFIED TYPE: ICD-10-CM

## 2023-06-16 LAB
ALBUMIN SERPL BCP-MCNC: 4.4 G/DL (ref 3.2–4.9)
ALBUMIN/GLOB SERPL: 2 G/DL
ALP SERPL-CCNC: 70 U/L (ref 30–99)
ALT SERPL-CCNC: 22 U/L (ref 2–50)
ANION GAP SERPL CALC-SCNC: 15 MMOL/L (ref 7–16)
AST SERPL-CCNC: 14 U/L (ref 12–45)
BASOPHILS # BLD AUTO: 0.4 % (ref 0–1.8)
BASOPHILS # BLD: 0.03 K/UL (ref 0–0.12)
BILIRUB SERPL-MCNC: 0.3 MG/DL (ref 0.1–1.5)
BUN SERPL-MCNC: 20 MG/DL (ref 8–22)
CALCIUM ALBUM COR SERPL-MCNC: 9.4 MG/DL (ref 8.5–10.5)
CALCIUM SERPL-MCNC: 9.7 MG/DL (ref 8.5–10.5)
CHLORIDE SERPL-SCNC: 110 MMOL/L (ref 96–112)
CO2 SERPL-SCNC: 14 MMOL/L (ref 20–33)
CREAT SERPL-MCNC: 0.69 MG/DL (ref 0.5–1.4)
D DIMER PPP IA.FEU-MCNC: <0.27 UG/ML (FEU) (ref 0–0.5)
EKG IMPRESSION: NORMAL
EOSINOPHIL # BLD AUTO: 0.01 K/UL (ref 0–0.51)
EOSINOPHIL NFR BLD: 0.1 % (ref 0–6.9)
ERYTHROCYTE [DISTWIDTH] IN BLOOD BY AUTOMATED COUNT: 39.9 FL (ref 35.9–50)
FLUAV RNA SPEC QL NAA+PROBE: NEGATIVE
FLUBV RNA SPEC QL NAA+PROBE: NEGATIVE
GFR SERPLBLD CREATININE-BSD FMLA CKD-EPI: 117 ML/MIN/1.73 M 2
GLOBULIN SER CALC-MCNC: 2.2 G/DL (ref 1.9–3.5)
GLUCOSE SERPL-MCNC: 183 MG/DL (ref 65–99)
HCG SERPL QL: NEGATIVE
HCT VFR BLD AUTO: 38 % (ref 37–47)
HGB BLD-MCNC: 13.8 G/DL (ref 12–16)
IMM GRANULOCYTES # BLD AUTO: 0.04 K/UL (ref 0–0.11)
IMM GRANULOCYTES NFR BLD AUTO: 0.6 % (ref 0–0.9)
LYMPHOCYTES # BLD AUTO: 0.77 K/UL (ref 1–4.8)
LYMPHOCYTES NFR BLD: 11.1 % (ref 22–41)
MCH RBC QN AUTO: 31.7 PG (ref 27–33)
MCHC RBC AUTO-ENTMCNC: 36.3 G/DL (ref 32.2–35.5)
MCV RBC AUTO: 87.2 FL (ref 81.4–97.8)
MONOCYTES # BLD AUTO: 0.12 K/UL (ref 0–0.85)
MONOCYTES NFR BLD AUTO: 1.7 % (ref 0–13.4)
NEUTROPHILS # BLD AUTO: 5.99 K/UL (ref 1.82–7.42)
NEUTROPHILS NFR BLD: 86.1 % (ref 44–72)
NRBC # BLD AUTO: 0 K/UL
NRBC BLD-RTO: 0 /100 WBC (ref 0–0.2)
PLATELET # BLD AUTO: 191 K/UL (ref 164–446)
PMV BLD AUTO: 11.5 FL (ref 9–12.9)
POTASSIUM SERPL-SCNC: 4.2 MMOL/L (ref 3.6–5.5)
PROT SERPL-MCNC: 6.6 G/DL (ref 6–8.2)
RBC # BLD AUTO: 4.36 M/UL (ref 4.2–5.4)
RSV RNA SPEC QL NAA+PROBE: NEGATIVE
SARS-COV-2 RNA RESP QL NAA+PROBE: NEGATIVE
SODIUM SERPL-SCNC: 139 MMOL/L (ref 135–145)
WBC # BLD AUTO: 7 K/UL (ref 4.8–10.8)

## 2023-06-16 PROCEDURE — 3075F SYST BP GE 130 - 139MM HG: CPT | Performed by: STUDENT IN AN ORGANIZED HEALTH CARE EDUCATION/TRAINING PROGRAM

## 2023-06-16 PROCEDURE — 93005 ELECTROCARDIOGRAM TRACING: CPT | Performed by: EMERGENCY MEDICINE

## 2023-06-16 PROCEDURE — 71045 X-RAY EXAM CHEST 1 VIEW: CPT

## 2023-06-16 PROCEDURE — 93005 ELECTROCARDIOGRAM TRACING: CPT

## 2023-06-16 PROCEDURE — 85025 COMPLETE CBC W/AUTO DIFF WBC: CPT

## 2023-06-16 PROCEDURE — 0241U POCT CEPHEID COV-2, FLU A/B, RSV - PCR: CPT | Mod: QW | Performed by: STUDENT IN AN ORGANIZED HEALTH CARE EDUCATION/TRAINING PROGRAM

## 2023-06-16 PROCEDURE — 36415 COLL VENOUS BLD VENIPUNCTURE: CPT

## 2023-06-16 PROCEDURE — 99285 EMERGENCY DEPT VISIT HI MDM: CPT

## 2023-06-16 PROCEDURE — 700101 HCHG RX REV CODE 250: Mod: UD | Performed by: EMERGENCY MEDICINE

## 2023-06-16 PROCEDURE — 99214 OFFICE O/P EST MOD 30 MIN: CPT | Performed by: STUDENT IN AN ORGANIZED HEALTH CARE EDUCATION/TRAINING PROGRAM

## 2023-06-16 PROCEDURE — 80053 COMPREHEN METABOLIC PANEL: CPT

## 2023-06-16 PROCEDURE — 3079F DIAST BP 80-89 MM HG: CPT | Performed by: STUDENT IN AN ORGANIZED HEALTH CARE EDUCATION/TRAINING PROGRAM

## 2023-06-16 PROCEDURE — 84703 CHORIONIC GONADOTROPIN ASSAY: CPT

## 2023-06-16 PROCEDURE — 96374 THER/PROPH/DIAG INJ IV PUSH: CPT

## 2023-06-16 PROCEDURE — 85379 FIBRIN DEGRADATION QUANT: CPT

## 2023-06-16 PROCEDURE — 700111 HCHG RX REV CODE 636 W/ 250 OVERRIDE (IP): Mod: UD | Performed by: EMERGENCY MEDICINE

## 2023-06-16 PROCEDURE — 94640 AIRWAY INHALATION TREATMENT: CPT

## 2023-06-16 RX ORDER — ALBUTEROL SULFATE 90 UG/1
2 AEROSOL, METERED RESPIRATORY (INHALATION) EVERY 6 HOURS PRN
Qty: 8.5 G | Refills: 0 | Status: SHIPPED | OUTPATIENT
Start: 2023-06-16 | End: 2024-01-16

## 2023-06-16 RX ORDER — PREDNISONE 20 MG/1
40 TABLET ORAL DAILY
Qty: 10 TABLET | Refills: 0 | Status: SHIPPED | OUTPATIENT
Start: 2023-06-16 | End: 2023-06-21

## 2023-06-16 RX ORDER — METHYLPREDNISOLONE SODIUM SUCCINATE 125 MG/2ML
125 INJECTION, POWDER, LYOPHILIZED, FOR SOLUTION INTRAMUSCULAR; INTRAVENOUS ONCE
Status: COMPLETED | OUTPATIENT
Start: 2023-06-16 | End: 2023-06-16

## 2023-06-16 RX ADMIN — ALBUTEROL SULFATE 2.5 MG: 2.5 SOLUTION RESPIRATORY (INHALATION) at 22:09

## 2023-06-16 RX ADMIN — METHYLPREDNISOLONE SODIUM SUCCINATE 125 MG: 125 INJECTION, POWDER, FOR SOLUTION INTRAMUSCULAR; INTRAVENOUS at 22:07

## 2023-06-16 ASSESSMENT — FIBROSIS 4 INDEX: FIB4 SCORE: 0.83

## 2023-06-16 NOTE — PROGRESS NOTES
Subjective:   Kristin Balderrama is a 32 y.o. female who presents for Asthma      HPI:  Pleasant 32-year-old female presents the urgent care for asthma exacerbation.  She states that she was at [facility] emergency department on 6/14/2023 for the same asthma exacerbation.  X-ray imaging was performed and showed no signs of pneumonia.  DuoNeb administered and prednisone 60 mg given.  Her wheezing did resolve and she was stable at the time of discharge.  She states that she did have mild relief with her prednisone but her exacerbation came back over the past 24 hours.  She also reports 4 days of nasal congestion, sinus pressure, and runny nose.  She has been using her nebulizer treatments at home.  She states that this medication does control her wheezing but it quickly comes back after.  She has used her albuterol inhaler 4 times a day and nebulizer treatment once.  Last treatment approximately 30 minutes ago.  Denies fever, chills, nausea, vomiting, abdominal pain, chest pain, palpitations, current shortness of breath, current wheezing, sputum production, cough, dizziness, or headache.    Medications:    albuterol Aers  Corlanor Tabs  diphenhydrAMINE  etonogestrel Impl  ibuprofen Tabs  Melatonin Tabs  metoprolol SR Tb24  naproxen Tabs  predniSONE Tabs  sodium chloride Tabs  tizanidine  ziprasidone Caps    Allergies: Cefdinir, Depakote [divalproex sodium], Doxycycline, Montelukast [singulair], Vancomycin, Wound dressing adhesive, Amitriptyline, Aripiprazole, Aripiprazole [abilify], Clindamycin, Flomax [tamsulosin hydrochloride], Levaquin, Metformin, Tamsulosin, Tape, Amoxicillin, Depakote [divalproex sodium], Erythromycin, Hydromorphone, Ampicillin, Ciprofloxacin, Keflex, Levofloxacin, Metronidazole, Penicillins, Sulfa drugs, and Valproic acid    Problem List: Kristin Balderrama does not have any pertinent problems on file.    Surgical History:  Past Surgical History:   Procedure Laterality Date    PB PERCUT FIX  PBOX/NECK FEMUR FX Left 1/28/2023    Procedure: FIXATION, HIP, USING CANNULATED SCREW;  Surgeon: Noman Abdul M.D.;  Location: SURGERY [facility];  Service: Orthopedics    TN LAP,DIAGNOSTIC ABDOMEN  2/14/2022    Procedure: LAPAROSCOPY;  Surgeon: Seamus Pisano M.D.;  Location: SURGERY SAME DAY [facility];  Service: Gynecology    OVARIAN CYSTECTOMY Right 2/14/2022    Procedure: EXCISION, CYST, OVARY;  Surgeon: Seamus Pisano M.D.;  Location: SURGERY SAME DAY [facility];  Service: Gynecology    BOWEL STIMULATOR INSERTION  3/10/2021    Procedure: INSERTION, ELECTRODE LEADS AND PULSE GENERATOR, NEUROSTIMULATOR, SACRAL - REMOVAL OF INTERSTIM WITH REPLACEMENT OF SACRAL NEUROMODULATION DEVICE;  Surgeon: Joe Noyola M.D.;  Location: [facility];  Service: General    MUSCLE BIOPSY Right 1/26/2017    Procedure: MUSCLE BIOPSY - THIGH;  Surgeon: Isidro Vigil M.D.;  Location: [facility];  Service:     GASTROSCOPY WITH BALLOON DILATATION N/A 1/4/2017    Procedure: GASTROSCOPY WITH DILATATION;  Surgeon: Torres Vargas M.D.;  Location: [facility];  Service:     BOWEL STIMULATOR INSERTION  7/13/2016    Procedure: BOWEL STIMULATOR INSERTION FOR PERMANENT INTERSTIM SACRAL IMPLANT;  Surgeon: Joe Noyola M.D.;  Location: [facility];  Service:     RECOVERY  1/27/2016    Procedure: CATH LAB EP STUDY WITH SINUS NODE MODIFICATION ABHINAV;  Surgeon: Recoveryonly Surgery;  [facility] [facility];  Service:     OTHER CARDIAC SURGERY  1/2016    cardiac ablation    NEURO DEST FACET L/S W/IG SNGL  4/21/2015    Performed by Reza Tabor at [facility]ERY [facility]    LUMBAR FUSION ANTERIOR  8/21/2012    Performed by SUSIE SAWANT at SURGERY [facility]S    ALYSSA BY LAPAROSCOPY  8/29/2010    Performed by SHAYY JOHNS at SURGERY [facility]    LAMINOTOMY      OTHER ABDOMINAL SURGERY      TONSILLECTOMY      tonsillectomy       Past Social Hx: Kristin Armstrong  "[place]  reports that she has never smoked. She has never used smokeless tobacco. She reports that she does not currently use drugs after having used the following drugs: Marijuana. Frequency: 7.00 times per week. She reports that she does not drink alcohol.     Past Family Hx:  Kristin Balderrama family history includes Genitourinary () Problems in her sister; Heart Disease in her maternal grandmother and mother; Hypertension in her maternal grandmother, maternal uncle, and mother; No Known Problems in her sister; Other in her mother and sister; Sleep Apnea in her mother.     Problem list, medications, and allergies reviewed by myself today in Epic.     Objective:     /84   Pulse 78   Temp 36.4 °C (97.6 °F) (Temporal)   Resp 17   Ht 1.626 m (5' 4\")   LMP  (LMP Unknown)   SpO2 96%   BMI 40.51 kg/m²     Physical Exam  Vitals reviewed.   Constitutional:       General: She is not in acute distress.     Appearance: Normal appearance.   HENT:      Head: Normocephalic.      Right Ear: Tympanic membrane, ear canal and external ear normal.      Left Ear: Tympanic membrane, ear canal and external ear normal.      Nose: Congestion and rhinorrhea present.      Mouth/Throat:      Mouth: Mucous membranes are moist.   Eyes:      Conjunctiva/sclera: Conjunctivae normal.      Pupils: Pupils are equal, round, and reactive to light.   Cardiovascular:      Rate and Rhythm: Normal rate and regular rhythm.      Pulses: Normal pulses.      Heart sounds: Normal heart sounds. No murmur heard.  Pulmonary:      Effort: Pulmonary effort is normal. No respiratory distress.      Breath sounds: Normal breath sounds. No stridor. No wheezing, rhonchi or rales.   Musculoskeletal:      Cervical back: Normal range of motion.   Lymphadenopathy:      Cervical: No cervical adenopathy.   Skin:     General: Skin is warm and dry.   Neurological:      Mental Status: She is alert and oriented to person, place, and time. "         Assessment/Plan:     Diagnosis and associated orders:     1. Upper respiratory tract infection, unspecified type  POCT CoV-2, Flu A/B, RSV by PCR      2. Exacerbation of asthma, unspecified asthma severity, unspecified whether persistent  predniSONE (DELTASONE) 20 MG Tab         Comments/MDM:     Patient's presentation physical exam findings are consistent with upper respiratory tract infection.  We will test for COVID, flu, and RSV which the patient would like at this time.  She does also appear to be dealing with acute asthma exacerbation.  Pulmonary exam shows no wheezing, rales, rhonchi.  SPO2 greater than 96% in clinic.  No respiratory distress.  She did recently complete a DuoNeb treatment at home 30 minutes ago.  She reports that this did resolve her wheezing.  We will place her on a 5-day course of prednisone which she is agreeable to.  Patient will be contacted with any positive results on viral testing.  I do not suspect pneumonia at this time.  Reviewed outside imaging and labs.  Patient is agreeable to the plan.  Strict ED/return precautions given.         Differential diagnosis, natural history, supportive care, and indications for immediate follow-up discussed.    Advised the patient to follow-up with the primary care physician for recheck, reevaluation, and consideration of further management.    Please note that this dictation was created using voice recognition software. I have made a reasonable attempt to correct obvious errors, but I expect that there are errors of grammar and possibly content that I did not discover before finalizing the note.    Electronically signed by Jose Lee PA-C.

## 2023-06-17 NOTE — ED NOTES
Discharge instructions reviewed with patient. Patient verbalizes understanding of follow up care, medication management, and reasons to return to ER. PIV removed with no complications. Patient wheeled to lobby per patient request. Patient has chronic hip issue.

## 2023-06-17 NOTE — ED NOTES
Patient wheeled to room. Mother at side. Patient panting at rest, but able to speak 7-8 word sentences without difficulty or interruption. RA sat 99%. Complaint consistent with triage note. Patient states she feels like it it tough to breathe out. Her inhalers help, but the relief is short.

## 2023-06-17 NOTE — ED PROVIDER NOTES
"ED Provider Note    CHIEF COMPLAINT  Chief Complaint   Patient presents with    Asthma     Pt seen at  today for asthma exacerbation secondary to an URTI (x 3 days of congestion & cough). Pt given steroids in , decided to come to ED today as she tried to lie down at home \"and it felt like I was drowning\". No audible wheezes in triage.        EXTERNAL RECORDS REVIEWED  None    HPI/ROS  LIMITATION TO HISTORY   None  OUTSIDE HISTORIAN(S):  None    Kristin Balderrama is a 32 y.o. female who presents here for evaluation of \"asthma.\"  Patient states that she has been having intermittent asthma exacerbations over the last few days.  She states that she has been using her inhaler with some relief, but was seen and evaluated in urgent care today for similar issues.  She was given 1 dose of steroids, and told to follow-up.  She states that when she went home to lay down, she was having some increasing work of breathing, so she came in here.  She has no productive cough, no fever chills, and no vomiting.    PAST MEDICAL HISTORY   has a past medical history of Abdominal pain, Anginal syndrome, Apnea, sleep, Arrhythmia, Arthritis, ASTHMA, Back pain, Borderline personality disorder (HCC), Breath shortness, Bronchitis (02/08/2022), Cardiac arrhythmia, Chickenpox, Chronic UTI (09/18/2010), Cough, Daytime sleepiness, Dental disorder (03/08/2021), Depression, Diabetes (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), Nausea, Obesity, Other fatigue (06/29/2020), Pain (08/15/2012), Painful joint, PCOS (polycystic ovarian syndrome), Pneumonia (2012 12/2020), Psychosis ([facility]), Ringing in ears, Scoliosis, Shortness of breath, Sinus tachycardia (10/31/2013), Sleep apnea, Snoring, Tonsillitis, Transverse myelitis ([facility]), " Tuberculosis, Urinary bladder disorder, Urinary incontinence, Weakness, and Wears glasses.    SURGICAL HISTORY   has a past surgical history that includes neuro dest facet l/s w/ig sngl (4/21/2015); recovery (1/27/2016); delmar by laparoscopy (8/29/2010); lumbar fusion anterior (8/21/2012); other cardiac surgery (1/2016); tonsillectomy; bowel stimulator insertion (7/13/2016); gastroscopy with balloon dilatation (N/A, 1/4/2017); muscle biopsy (Right, 1/26/2017); other abdominal surgery; laminotomy; bowel stimulator insertion (3/10/2021); lap,diagnostic abdomen (2/14/2022); ovarian cystectomy (Right, 2/14/2022); and percut fix prox/neck femur fx (Left, 1/28/2023).    FAMILY HISTORY  Family History   Problem Relation Age of Onset    Hypertension Mother     Sleep Apnea Mother     Heart Disease Mother     Other Mother         hypothryod    Hypertension Maternal Uncle     Heart Disease Maternal Grandmother     Hypertension Maternal Grandmother     No Known Problems Sister     Other Sister         Narcolepsy;fibromyalsia;bone;nerve    Genitourinary () Problems Sister         endometriosis       SOCIAL HISTORY  Social History     Tobacco Use    Smoking status: Never    Smokeless tobacco: Never   Vaping Use    Vaping Use: Never used   Substance and Sexual Activity    Alcohol use: No     Alcohol/week: 0.0 oz    Drug use: Not Currently     Frequency: 7.0 times per week     Types: Marijuana    Sexual activity: Not Currently     Birth control/protection: Implant       CURRENT MEDICATIONS  Home Medications       Reviewed by Elva Zepeda R.N. (Registered Nurse) on 06/16/23 at 2039  Med List Status: Not Addressed     Medication Last Dose Status   albuterol 108 (90 Base) MCG/ACT Aero Soln inhalation aerosol  Active   diphenhydrAMINE (BENADRYL) 25 MG capsule  Active   etonogestrel (NEXPLANON) 68 MG Implant implant  Active   ibuprofen (MOTRIN) 800 MG Tab  Active   ivabradine (CORLANOR) 7.5 MG Tab tablet  Active   Melatonin 10 MG  "Tab  Active   metoprolol SR (TOPROL XL) 25 MG TABLET SR 24 HR  Active   naproxen (NAPROSYN) 250 MG Tab  Active   predniSONE (DELTASONE) 20 MG Tab  Active   sodium chloride (SALT) 1 GM Tab  Active   tizanidine (ZANAFLEX) 2 MG tablet  Active   ziprasidone (GEODON) 40 MG Cap  Active                    ALLERGIES  Allergies   Allergen Reactions    Cefdinir Shortness of Breath and Itching     Tolerated 1/18/17  Tolerates ceftriaxone  Tolerated augmentin 8/2019     Depakote [Divalproex Sodium] Unspecified     Muscle spasms/muscle pain and weakness      Doxycycline Anaphylaxis and Vomiting     Other reaction(s): pustules/blisters  Other reaction(s): stomach pain    Montelukast [Singulair] Unspecified     Cardiac effusion    Vancomycin Itching     Pt becomes flushed in face and chest.   RXN=7/10/16    Wound Dressing Adhesive Rash     By pt report-\"removes skin totally off\"    Amitriptyline Unspecified     Headaches      Aripiprazole Unspecified    Aripiprazole [Abilify] Unspecified     Headaches/muscle twitching      Clindamycin Nausea         Other reaction(s): nausea stomach pain    Flomax [Tamsulosin Hydrochloride] Swelling    Levaquin Unspecified     Severe muscle cramps in legs  RXN=unknown  Other reaction(s): leg muscle cramps    Metformin Unspecified     Increased lactic acid     Other reaction(s): itching and rash/nausea vomiting    Tamsulosin Swelling     Swelling of legs    Tape Rash     Tears skin off  coban with Tegaderm tape ok intermittently  RXN=ongoing    Amoxicillin Rash    Depakote [Divalproex Sodium]     Erythromycin Rash     .  Other reaction(s): nausea stomach pain    Hydromorphone      Other reaction(s): vomiting    Ampicillin Rash     Pt reports that she received a rash     Ciprofloxacin Rash          Keflex Rash     Pt states she gets a rash with this medication  Tolerates ceftriaxone  Can take with Benadryl    Levofloxacin Unspecified     Leg muscle cramps    Metronidazole Rash     \"Vision " "problems\"  Other reaction(s): vision problems    Penicillins Hives     Can take with Benadryl    Sulfa Drugs Itching and Myalgia     Muscle pain and weakness  Other reaction(s): unknown    Valproic Acid Rash     .       PHYSICAL EXAM  VITAL SIGNS: /77   Pulse 71   Temp 36.5 °C (97.7 °F) (Temporal)   Resp 18   Ht 1.626 m (5' 4\")   Wt 107 kg (236 lb)   LMP  (LMP Unknown)   SpO2 99%   BMI 40.51 kg/m²    Constitutional: Well developed, well nourished.  Held acute distress.  HEENT: Normocephalic, atraumatic. Posterior pharynx clear and moist.  Eyes:  EOMI. Normal sclera.  Neck: Supple, Full range of motion, nontender.  No stridor  Chest/Pulmonary: clear to ausculation. Symmetrical expansion.  No wheezing  Cardio: Regular rate and rhythm with no murmur.   Abdomen: Soft, nontender. No peritoneal signs. No guarding. No palpable masses.  Musculoskeletal: No deformity, no edema, neurovascular intact.   Neuro: Clear speech, appropriate, cooperative, cranial nerves II-XII grossly intact.  Psych: Anxious mood and affect      DIAGNOSTIC STUDIES / PROCEDURES  Results for orders placed or performed during the hospital encounter of 06/16/23   CBC WITH DIFFERENTIAL   Result Value Ref Range    WBC 7.0 4.8 - 10.8 K/uL    RBC 4.36 4.20 - 5.40 M/uL    Hemoglobin 13.8 12.0 - 16.0 g/dL    Hematocrit 38.0 37.0 - 47.0 %    MCV 87.2 81.4 - 97.8 fL    MCH 31.7 27.0 - 33.0 pg    MCHC 36.3 (H) 32.2 - 35.5 g/dL    RDW 39.9 35.9 - 50.0 fL    Platelet Count 191 164 - 446 K/uL    MPV 11.5 9.0 - 12.9 fL    Neutrophils-Polys 86.10 (H) 44.00 - 72.00 %    Lymphocytes 11.10 (L) 22.00 - 41.00 %    Monocytes 1.70 0.00 - 13.40 %    Eosinophils 0.10 0.00 - 6.90 %    Basophils 0.40 0.00 - 1.80 %    Immature Granulocytes 0.60 0.00 - 0.90 %    Nucleated RBC 0.00 0.00 - 0.20 /100 WBC    Neutrophils (Absolute) 5.99 1.82 - 7.42 K/uL    Lymphs (Absolute) 0.77 (L) 1.00 - 4.80 K/uL    Monos (Absolute) 0.12 0.00 - 0.85 K/uL    Eos (Absolute) 0.01 0.00 - " 0.51 K/uL    Baso (Absolute) 0.03 0.00 - 0.12 K/uL    Immature Granulocytes (abs) 0.04 0.00 - 0.11 K/uL    NRBC (Absolute) 0.00 K/uL   COMP METABOLIC PANEL   Result Value Ref Range    Sodium 139 135 - 145 mmol/L    Potassium 4.2 3.6 - 5.5 mmol/L    Chloride 110 96 - 112 mmol/L    Co2 14 (L) 20 - 33 mmol/L    Anion Gap 15.0 7.0 - 16.0    Glucose 183 (H) 65 - 99 mg/dL    Bun 20 8 - 22 mg/dL    Creatinine 0.69 0.50 - 1.40 mg/dL    Calcium 9.7 8.5 - 10.5 mg/dL    AST(SGOT) 14 12 - 45 U/L    ALT(SGPT) 22 2 - 50 U/L    Alkaline Phosphatase 70 30 - 99 U/L    Total Bilirubin 0.3 0.1 - 1.5 mg/dL    Albumin 4.4 3.2 - 4.9 g/dL    Total Protein 6.6 6.0 - 8.2 g/dL    Globulin 2.2 1.9 - 3.5 g/dL    A-G Ratio 2.0 g/dL   D-DIMER   Result Value Ref Range    D-Dimer <0.27 0.00 - 0.50 ug/mL (FEU)   BETA-HCG QUALITATIVE SERUM   Result Value Ref Range    Beta-Hcg Qualitative Serum Negative Negative   CORRECTED CALCIUM   Result Value Ref Range    Correct Calcium 9.4 8.5 - 10.5 mg/dL   ESTIMATED GFR   Result Value Ref Range    GFR (CKD-EPI) 117 >60 mL/min/1.73 m 2   EKG (NOW)   Result Value Ref Range    Report       [facility] Emergency Dept.    Test Date:  2023  Pt Name:    HARLEY DE LA CRUZ              Department: ER  MRN:        [id number]                      Room:  Gender:     Female                       Technician: [id number]  :        1990                   Requested By:ER TRIAGE PROTOCOL  Order #:    [id number]                    Reading MD:    Measurements  Intervals                                Axis  Rate:       71                           P:          28  SD:         141                          QRS:        16  QRSD:       96                           T:          9  QT:         415  QTc:        451    Interpretive Statements  Sinus rhythm  Borderline T abnormalities, anterior leads  Compared to ECG 2023 05:43:23  No significant changes       *Note: Due to a large number of results and/or  encounters for the requested time period, some results have not been displayed. A complete set of results can be found in Results Review.     EKG; normal sinus rhythm rate of 71.  No ST elevation, no ST depression.  QTc is 451.  Compared to EKG from 6/9/2023      RADIOLOGY  I have independently interpreted the diagnostic imaging associated with this visit and am waiting the final reading from the radiologist.   My preliminary interpretation is as follows: See below  Radiologist interpretation:   DX-CHEST-LIMITED (1 VIEW)   Final Result      No radiographic evidence of acute cardiopulmonary process. Decrease in lung volumes            COURSE & MEDICAL DECISION MAKING    None    INITIAL ASSESSMENT, COURSE AND PLAN  Care Narrative: This is a 32-year-old female here for evaluation of asthma exacerbation.  Patient on arrival was hyperventilating, but had no wheezing on exam.  She had no stridor.  Since she has been here, she is calm down, she received a breathing treatment, steroids, and has prednisone waiting for her at home.  Her x-ray is unremarkable for any infection, and her lab work is unremarkable as well, including a D-dimer.  The patient will follow-up as outpatient, or return here for any further issues or concerns.  She has a room air saturation of 96%, her heart rate is in the 70s, and she is not in any distress.    DISPOSITION AND DISCUSSIONS  I have discussed management of the patient with the following physicians and ARIES's: None    Discussion of management with other [facility] or appropriate source(s): None    Escalation of care considered, and ultimately not performed: None    Barriers to care at this time, including but not limited to: Patient does not have established PCP.     Decision tools and prescription drugs considered including, but not limited to:  None .    FINAL DIAGNOSIS  1. Mild intermittent asthma with acute exacerbation           Electronically signed by: Valente Castellon D.O., 6/16/2023 9:48  PM

## 2023-06-26 NOTE — OR NURSING
COVID-19 Pre-surgery screenin. Do you have an undiagnosed respiratory illness or symptoms such as coughing or sneezing? No  a. Onset of Sx No  b. Acute vs. chronic respiratory illness No    2. Do you have an unexplained fever greater than 100.4 degrees Fahrenheit or 38 degrees Celsius?     No     3. Have you had direct exposure to a patient who tested positive for Covid-19?    No     4. Have you had any loss of your sense of taste or smell? Have you had N/V or sore throat? No    Patient has been informed of visitor policy and asked to wear a mask upon entering the hospital   Yes       [FreeTextEntry1] : IMPRESSION: \par 79F with prior R frontal-parietal craniotomy and cristobal holes 2012 at [facility], p/w AMS, generalized weakness s/p fall, CTH 5/19/23 showed no acute hemorrhage, infarct, or skull fracture, 3.7 x 2.1 x 1.2cm CSF density extra-axial mass with mass effect.\par \par \par \par \par PLAN:

## 2023-07-02 ENCOUNTER — HOSPITAL ENCOUNTER (EMERGENCY)
Facility: MEDICAL CENTER | Age: 33
End: 2023-07-02
Attending: EMERGENCY MEDICINE
Payer: MEDICARE

## 2023-07-02 ENCOUNTER — APPOINTMENT (OUTPATIENT)
Dept: RADIOLOGY | Facility: MEDICAL CENTER | Age: 33
End: 2023-07-02
Attending: EMERGENCY MEDICINE
Payer: MEDICARE

## 2023-07-02 VITALS
OXYGEN SATURATION: 99 % | WEIGHT: 238.98 LBS | HEART RATE: 78 BPM | HEIGHT: 64 IN | RESPIRATION RATE: 17 BRPM | TEMPERATURE: 98 F | BODY MASS INDEX: 40.8 KG/M2 | SYSTOLIC BLOOD PRESSURE: 156 MMHG | DIASTOLIC BLOOD PRESSURE: 85 MMHG

## 2023-07-02 DIAGNOSIS — R07.9 ACUTE CHEST PAIN: ICD-10-CM

## 2023-07-02 LAB — EKG IMPRESSION: NORMAL

## 2023-07-02 PROCEDURE — 93005 ELECTROCARDIOGRAM TRACING: CPT | Performed by: EMERGENCY MEDICINE

## 2023-07-02 PROCEDURE — 71045 X-RAY EXAM CHEST 1 VIEW: CPT

## 2023-07-02 PROCEDURE — 93005 ELECTROCARDIOGRAM TRACING: CPT

## 2023-07-02 PROCEDURE — 99283 EMERGENCY DEPT VISIT LOW MDM: CPT

## 2023-07-02 PROCEDURE — A9270 NON-COVERED ITEM OR SERVICE: HCPCS | Performed by: EMERGENCY MEDICINE

## 2023-07-02 PROCEDURE — 700102 HCHG RX REV CODE 250 W/ 637 OVERRIDE(OP): Performed by: EMERGENCY MEDICINE

## 2023-07-02 RX ORDER — NAPROXEN 500 MG/1
500 TABLET ORAL 2 TIMES DAILY WITH MEALS
Qty: 20 TABLET | Refills: 0 | Status: SHIPPED | OUTPATIENT
Start: 2023-07-02 | End: 2024-01-16

## 2023-07-02 RX ADMIN — LIDOCAINE HYDROCHLORIDE 30 ML: 20 SOLUTION OROPHARYNGEAL at 14:19

## 2023-07-02 ASSESSMENT — FIBROSIS 4 INDEX: FIB4 SCORE: 0.52

## 2023-07-02 NOTE — ED PROVIDER NOTES
"ED Provider Note    CHIEF COMPLAINT  Chief Complaint   Patient presents with    Chest Pain     Patient reports L side chest pain that radiates into L shoulder that started at 0500 today. Patient reports the pain is constant and describes it as \"pressure\".       EXTERNAL RECORDS REVIEWED  2/3/2023, medicine consultation during rehab for chest pain, ACS rule out    HPI/ROS    Kristin Balderrama is a 32 y.o. female who presents for evaluation of chest pain for 9 hours and 10 minutes.  Extensive medical history and psychiatric history.  Expresses concern that while at [employer] today after \"taking the sacrament\" she vomited.  Has a known hiatal hernia.  No loss of conscious.  No abdominal pain.  No other acute complaints.  Most recently evaluated emergency department 6/16/2023, had negative D-dimer excluding PE.    PAST MEDICAL HISTORY   has a past medical history of Abdominal pain, Anginal syndrome, Apnea, sleep, Arrhythmia, Arthritis, ASTHMA, Back pain, Borderline personality disorder ([facility]), Breath shortness, Bronchitis (02/08/2022), Cardiac arrhythmia, Chickenpox, Chronic UTI (09/18/2010), Cough, Daytime sleepiness, Dental disorder (03/08/2021), Depression, Diabetes ([facility]), Diarrhea, Disorder of thyroid, Fall, Fatigue, Frequent headaches, Gasping for breath, Gynecological disorder, Headache(784.0), Heart burn, Heart murmur, History of falling, Indigestion, Migraine, 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    SURGICAL HISTORY   has a past surgical history that includes neuro dest facet l/s w/ig sngl " (4/21/2015); recovery (1/27/2016); delmar by laparoscopy (8/29/2010); lumbar fusion anterior (8/21/2012); other cardiac surgery (1/2016); tonsillectomy; bowel stimulator insertion (7/13/2016); gastroscopy with balloon dilatation (N/A, 1/4/2017); muscle biopsy (Right, 1/26/2017); other abdominal surgery; laminotomy; bowel stimulator insertion (3/10/2021); lap,diagnostic abdomen (2/14/2022); ovarian cystectomy (Right, 2/14/2022); and percut fix prox/neck femur fx (Left, 1/28/2023).    FAMILY HISTORY  Family History   Problem Relation Age of Onset    Hypertension Mother     Sleep Apnea Mother     Heart Disease Mother     Other Mother         hypothryod    Hypertension Maternal Uncle     Heart Disease Maternal Grandmother     Hypertension Maternal Grandmother     No Known Problems Sister     Other Sister         Narcolepsy;fibromyalsia;bone;nerve    Genitourinary () Problems Sister         endometriosis       SOCIAL HISTORY  Social History     Tobacco Use    Smoking status: Never    Smokeless tobacco: Never   Vaping Use    Vaping Use: Never used   Substance and Sexual Activity    Alcohol use: No     Alcohol/week: 0.0 oz    Drug use: Not Currently     Frequency: 7.0 times per week     Types: Marijuana    Sexual activity: Not Currently     Birth control/protection: Implant       CURRENT MEDICATIONS  Home Medications       Reviewed by Eulalia Holcomb R.N. (Registered Nurse) on 07/02/23 at 1311  Med List Status: Partial     Medication Last Dose Status   albuterol 108 (90 Base) MCG/ACT Aero Soln inhalation aerosol  Active   albuterol 108 (90 Base) MCG/ACT Aero Soln inhalation aerosol  Active   diphenhydrAMINE (BENADRYL) 25 MG capsule  Active   etonogestrel (NEXPLANON) 68 MG Implant implant  Active   ivabradine (CORLANOR) 7.5 MG Tab tablet  Active   Melatonin 10 MG Tab  Active   metoprolol SR (TOPROL XL) 25 MG TABLET SR 24 HR  Active   naproxen (NAPROSYN) 250 MG Tab  Active   prochlorperazine (COMPAZINE) 10 MG Tab  Active  "  sodium chloride (SALT) 1 GM Tab  Active   tizanidine (ZANAFLEX) 2 MG tablet  Active   ziprasidone (GEODON) 40 MG Cap  Active                    ALLERGIES  Allergies   Allergen Reactions    Cefdinir Shortness of Breath and Itching     Tolerated 1/18/17  Tolerates ceftriaxone  Tolerated augmentin 8/2019     Depakote [Divalproex Sodium] Unspecified     Muscle spasms/muscle pain and weakness      Doxycycline Anaphylaxis and Vomiting     Other reaction(s): pustules/blisters  Other reaction(s): stomach pain    Montelukast [Singulair] Unspecified     Cardiac effusion    Vancomycin Itching     Pt becomes flushed in face and chest.   RXN=7/10/16    Wound Dressing Adhesive Rash     By pt report-\"removes skin totally off\"    Amitriptyline Unspecified     Headaches      Aripiprazole Unspecified    Aripiprazole [Abilify] Unspecified     Headaches/muscle twitching      Clindamycin Nausea         Other reaction(s): nausea stomach pain    Flomax [Tamsulosin Hydrochloride] Swelling    Levaquin Unspecified     Severe muscle cramps in legs  RXN=unknown  Other reaction(s): leg muscle cramps    Metformin Unspecified     Increased lactic acid     Other reaction(s): itching and rash/nausea vomiting    Tamsulosin Swelling     Swelling of legs    Tape Rash     Tears skin off  coban with Tegaderm tape ok intermittently  RXN=ongoing    Amoxicillin Rash    Depakote [Divalproex Sodium]     Erythromycin Rash     .  Other reaction(s): nausea stomach pain    Hydromorphone      Other reaction(s): vomiting    Montelukast     Ampicillin Rash     Pt reports that she received a rash     Ciprofloxacin Rash          Keflex Rash     Pt states she gets a rash with this medication  Tolerates ceftriaxone  Can take with Benadryl    Levofloxacin Unspecified     Leg muscle cramps    Metronidazole Rash     \"Vision problems\"  Other reaction(s): vision problems    Penicillins Hives     Can take with Benadryl    Sulfa Drugs Itching and Myalgia     Muscle pain and " "weakness  Other reaction(s): unknown    Valproic Acid Rash     .       PHYSICAL EXAM  VITAL SIGNS: BP (!) 159/87   Pulse 85   Temp 36.8 °C (98.3 °F) (Temporal)   Resp 19   Ht 1.626 m (5' 4\")   Wt 108 kg (238 lb 15.7 oz)   LMP  (LMP Unknown)   SpO2 97%   BMI 41.02 kg/m²    Constitutional: Alert in no apparent distress.  HENT: No signs of significant acute trauma.   Eyes: Conjunctiva normal, non-icteric.   Chest: Normal nonlabored respirations.  No wheezing.  Good air movement.  Skin: No appreciable rash on the exposed skin  Musculoskeletal: No obvious acute trauma appreciated  Neurologic: Alert, no obvious focal deficits noted.        DIAGNOSTIC STUDIES / PROCEDURES    LABS  Results for orders placed or performed during the hospital encounter of 23   EKG   Result Value Ref Range    Report       [facility] Emergency Dept.    Test Date:  2023  Pt Name:    HARLEY DE LA CRUZ              Department: ER  MRN:        [id number]                      Room:  Gender:     Female                       Technician: [id number]  :        1990                   Requested By:ER TRIAGE PROTOCOL  Order #:    [id number]                    Reading MD: DENNIS REGALADO MD    Measurements  Intervals                                Axis  Rate:       77                           P:          25  RI:         142                          QRS:        21  QRSD:       93                           T:          17  QT:         386  QTc:        437    Interpretive Statements  Sinus rhythm  Borderline T abnormalities, anterior leads  Compared to ECG 2023 20:33:59  No significant changes  I independently interpreted this EKG  Electronically Signed On 2023 13:25:07 PDT by DENNIS REGALADO MD       *Note: Due to a large number of results and/or encounters for the requested time period, some results have not been displayed. A complete set of results can be found in Results Review.        RADIOLOGY  I have " independently interpreted the diagnostic imaging associated with this visit and am waiting the final reading from the radiologist.   My preliminary interpretation is as follows: No pneumothorax  Radiologist interpretation:   DX-CHEST-PORTABLE (1 VIEW)   Final Result         No acute cardiac or pulmonary abnormality is identified.            COURSE & MEDICAL DECISION MAKING    INITIAL ASSESSMENT, COURSE AND PLAN  Care Narrative: 32-year-old female, well-known to this department, comes in with chest pain for exactly 9 hours and 10 minutes, 1 episode of vomiting after being in [employer].  Unremarkable exam.  Nonischemic EKG.  Normal chest x-ray.  At this point, no clear urgent or emergent etiologies to explain her symptoms, she is undergone recent work-up for similar symptoms with a negative D-dimer, I do not think this is pulmonary embolism, I do not think this is ACS, no need for further evaluation.  We will follow-up with her PCP, discharged home in stable condition    DISPOSITION AND DISCUSSIONS    Escalation of care considered, and ultimately not performed:acute inpatient care management, however at this time, the patient is most appropriate for outpatient management    Decision tools and prescription drugs considered including, but not limited to: Considered analgesics prescription but for now she will stick to over-the-counter analgesics.    FINAL DIAGNOSIS  1. Acute chest pain           Electronically signed by: Pedrito Bhatti M.D., 7/2/2023 2:15 PM

## 2023-07-02 NOTE — ED TRIAGE NOTES
"Chief Complaint   Patient presents with    Chest Pain     Patient reports L side chest pain that radiates into L shoulder that started at 0500 today. Patient reports the pain is constant and describes it as \"pressure\".       31 yo female to triage for above complaint.   EKG complete.    Pt is alert and oriented, speaking in full sentences, follows commands and responds appropriately to questions.     Patient placed back in lobby and educated on triage process. Asked to inform RN of any changes.    BP (!) 141/96   Pulse 87   Temp 36.8 °C (98.3 °F) (Temporal)   Resp 17   Ht 1.626 m (5' 4\")   Wt 108 kg (238 lb 15.7 oz)   LMP  (LMP Unknown)   SpO2 99%   BMI 41.02 kg/m²     "

## 2023-07-02 NOTE — ED NOTES
Pt ambulated to room with steady gait with assistance with her personal martinez. Placed on monitor. Call light in reach. Provided blankets for comfort. Up for ERP to see.

## 2023-07-03 ENCOUNTER — PATIENT MESSAGE (OUTPATIENT)
Dept: CARDIOLOGY | Facility: MEDICAL CENTER | Age: 33
End: 2023-07-03
Payer: MEDICARE

## 2023-07-05 ENCOUNTER — PATIENT MESSAGE (OUTPATIENT)
Dept: CARDIOLOGY | Facility: MEDICAL CENTER | Age: 33
End: 2023-07-05
Payer: MEDICARE

## 2023-07-05 DIAGNOSIS — I47.11 INAPPROPRIATE SINUS TACHYCARDIA (HCC): ICD-10-CM

## 2023-07-05 DIAGNOSIS — I47.10 SVT (SUPRAVENTRICULAR TACHYCARDIA) (HCC): ICD-10-CM

## 2023-07-05 DIAGNOSIS — R00.2 PALPITATIONS: ICD-10-CM

## 2023-07-05 DIAGNOSIS — R07.2 CHEST PAIN, PRECORDIAL: ICD-10-CM

## 2023-07-05 DIAGNOSIS — G90.A POSTURAL ORTHOSTATIC TACHYCARDIA SYNDROME: ICD-10-CM

## 2023-07-05 DIAGNOSIS — R07.89 ATYPICAL CHEST PAIN: ICD-10-CM

## 2023-07-05 NOTE — PROGRESS NOTES
There is a extremely high probability and most likely that the chest pain is not heart related    That being said if willing would switch metoprolol which is a beta-blocker to a calcium blocker of Cardizem which I would start by taking 60 mg 3 times a day (every 8 hours).  This medication comes in various doses either short acting or time-released so the starting dose is empiric and can be adjusted to see if measures any improvement.    Corlanor should be continued

## 2023-07-06 RX ORDER — DILTIAZEM HYDROCHLORIDE 60 MG/1
60 TABLET, FILM COATED ORAL 3 TIMES DAILY
Qty: 270 TABLET | Refills: 1 | Status: SHIPPED | OUTPATIENT
Start: 2023-07-06 | End: 2023-08-18

## 2023-07-17 ENCOUNTER — APPOINTMENT (OUTPATIENT)
Dept: ADMISSIONS | Facility: MEDICAL CENTER | Age: 33
End: 2023-07-17
Attending: COLON & RECTAL SURGERY
Payer: MEDICARE

## 2023-07-17 ENCOUNTER — HOSPITAL ENCOUNTER (OUTPATIENT)
Dept: LAB | Facility: MEDICAL CENTER | Age: 33
End: 2023-07-17
Attending: INTERNAL MEDICINE
Payer: MEDICARE

## 2023-07-17 LAB
ALBUMIN SERPL BCP-MCNC: 4.4 G/DL (ref 3.2–4.9)
ALBUMIN/GLOB SERPL: 2.2 G/DL
ALP SERPL-CCNC: 66 U/L (ref 30–99)
ALT SERPL-CCNC: 18 U/L (ref 2–50)
ANION GAP SERPL CALC-SCNC: 13 MMOL/L (ref 7–16)
AST SERPL-CCNC: 12 U/L (ref 12–45)
BILIRUB SERPL-MCNC: 0.4 MG/DL (ref 0.1–1.5)
BUN SERPL-MCNC: 10 MG/DL (ref 8–22)
CALCIUM ALBUM COR SERPL-MCNC: 9.1 MG/DL (ref 8.5–10.5)
CALCIUM SERPL-MCNC: 9.4 MG/DL (ref 8.5–10.5)
CHLORIDE SERPL-SCNC: 109 MMOL/L (ref 96–112)
CO2 SERPL-SCNC: 15 MMOL/L (ref 20–33)
CREAT SERPL-MCNC: 0.66 MG/DL (ref 0.5–1.4)
EST. AVERAGE GLUCOSE BLD GHB EST-MCNC: 103 MG/DL
GFR SERPLBLD CREATININE-BSD FMLA CKD-EPI: 118 ML/MIN/1.73 M 2
GLOBULIN SER CALC-MCNC: 2 G/DL (ref 1.9–3.5)
GLUCOSE SERPL-MCNC: 111 MG/DL (ref 65–99)
HBA1C MFR BLD: 5.2 % (ref 4–5.6)
POTASSIUM SERPL-SCNC: 4 MMOL/L (ref 3.6–5.5)
PROT SERPL-MCNC: 6.4 G/DL (ref 6–8.2)
SODIUM SERPL-SCNC: 137 MMOL/L (ref 135–145)

## 2023-07-17 PROCEDURE — 36415 COLL VENOUS BLD VENIPUNCTURE: CPT

## 2023-07-17 PROCEDURE — 80053 COMPREHEN METABOLIC PANEL: CPT

## 2023-07-17 PROCEDURE — 83036 HEMOGLOBIN GLYCOSYLATED A1C: CPT | Mod: GA

## 2023-07-19 ENCOUNTER — PRE-ADMISSION TESTING (OUTPATIENT)
Dept: ADMISSIONS | Facility: MEDICAL CENTER | Age: 33
End: 2023-07-19
Attending: COLON & RECTAL SURGERY
Payer: MEDICARE

## 2023-07-20 ENCOUNTER — APPOINTMENT (OUTPATIENT)
Dept: ADMISSIONS | Facility: MEDICAL CENTER | Age: 33
End: 2023-07-20
Attending: NEUROLOGICAL SURGERY
Payer: MEDICARE

## 2023-07-20 ENCOUNTER — ANESTHESIA EVENT (OUTPATIENT)
Dept: SURGERY | Facility: MEDICAL CENTER | Age: 33
End: 2023-07-20
Payer: MEDICARE

## 2023-07-21 ENCOUNTER — HOSPITAL ENCOUNTER (OUTPATIENT)
Facility: MEDICAL CENTER | Age: 33
End: 2023-07-21
Attending: COLON & RECTAL SURGERY | Admitting: COLON & RECTAL SURGERY
Payer: MEDICARE

## 2023-07-21 ENCOUNTER — ANESTHESIA (OUTPATIENT)
Dept: SURGERY | Facility: MEDICAL CENTER | Age: 33
End: 2023-07-21
Payer: MEDICARE

## 2023-07-21 VITALS
OXYGEN SATURATION: 97 % | HEIGHT: 64 IN | WEIGHT: 237.66 LBS | SYSTOLIC BLOOD PRESSURE: 133 MMHG | HEART RATE: 67 BPM | TEMPERATURE: 97.2 F | DIASTOLIC BLOOD PRESSURE: 68 MMHG | BODY MASS INDEX: 40.57 KG/M2 | RESPIRATION RATE: 18 BRPM

## 2023-07-21 DIAGNOSIS — Z98.890 POSTOPERATIVE NAUSEA: ICD-10-CM

## 2023-07-21 DIAGNOSIS — R11.0 POSTOPERATIVE NAUSEA: ICD-10-CM

## 2023-07-21 DIAGNOSIS — G89.18 POSTOPERATIVE PAIN: ICD-10-CM

## 2023-07-21 LAB
GLUCOSE BLD STRIP.AUTO-MCNC: 139 MG/DL (ref 65–99)
HCG UR QL: NEGATIVE

## 2023-07-21 PROCEDURE — 160025 RECOVERY II MINUTES (STATS): Performed by: COLON & RECTAL SURGERY

## 2023-07-21 PROCEDURE — C1781 MESH (IMPLANTABLE): HCPCS | Performed by: COLON & RECTAL SURGERY

## 2023-07-21 PROCEDURE — C1713 ANCHOR/SCREW BN/BN,TIS/BN: HCPCS | Performed by: COLON & RECTAL SURGERY

## 2023-07-21 PROCEDURE — 160046 HCHG PACU - 1ST 60 MINS PHASE II: Performed by: COLON & RECTAL SURGERY

## 2023-07-21 PROCEDURE — 00840 ANES IPER PX LOWER ABD NOS: CPT | Performed by: STUDENT IN AN ORGANIZED HEALTH CARE EDUCATION/TRAINING PROGRAM

## 2023-07-21 PROCEDURE — 82962 GLUCOSE BLOOD TEST: CPT

## 2023-07-21 PROCEDURE — 700101 HCHG RX REV CODE 250: Mod: UD | Performed by: STUDENT IN AN ORGANIZED HEALTH CARE EDUCATION/TRAINING PROGRAM

## 2023-07-21 PROCEDURE — 160002 HCHG RECOVERY MINUTES (STAT): Performed by: COLON & RECTAL SURGERY

## 2023-07-21 PROCEDURE — 160041 HCHG SURGERY MINUTES - EA ADDL 1 MIN LEVEL 4: Performed by: COLON & RECTAL SURGERY

## 2023-07-21 PROCEDURE — 700101 HCHG RX REV CODE 250: Mod: UD | Performed by: COLON & RECTAL SURGERY

## 2023-07-21 PROCEDURE — 160009 HCHG ANES TIME/MIN: Performed by: COLON & RECTAL SURGERY

## 2023-07-21 PROCEDURE — 160048 HCHG OR STATISTICAL LEVEL 1-5: Performed by: COLON & RECTAL SURGERY

## 2023-07-21 PROCEDURE — 700111 HCHG RX REV CODE 636 W/ 250 OVERRIDE (IP): Mod: UD | Performed by: STUDENT IN AN ORGANIZED HEALTH CARE EDUCATION/TRAINING PROGRAM

## 2023-07-21 PROCEDURE — 160029 HCHG SURGERY MINUTES - 1ST 30 MINS LEVEL 4: Performed by: COLON & RECTAL SURGERY

## 2023-07-21 PROCEDURE — 700105 HCHG RX REV CODE 258: Mod: JZ,UD | Performed by: COLON & RECTAL SURGERY

## 2023-07-21 PROCEDURE — 81025 URINE PREGNANCY TEST: CPT

## 2023-07-21 PROCEDURE — 160035 HCHG PACU - 1ST 60 MINS PHASE I: Performed by: COLON & RECTAL SURGERY

## 2023-07-21 PROCEDURE — A9270 NON-COVERED ITEM OR SERVICE: HCPCS | Mod: UD | Performed by: STUDENT IN AN ORGANIZED HEALTH CARE EDUCATION/TRAINING PROGRAM

## 2023-07-21 PROCEDURE — 700102 HCHG RX REV CODE 250 W/ 637 OVERRIDE(OP): Mod: UD | Performed by: STUDENT IN AN ORGANIZED HEALTH CARE EDUCATION/TRAINING PROGRAM

## 2023-07-21 DEVICE — MESH 3D MAX RIGHT 10.8 X 16CM - MED(1/CA): Type: IMPLANTABLE DEVICE | Status: FUNCTIONAL

## 2023-07-21 RX ORDER — SODIUM CHLORIDE, SODIUM LACTATE, POTASSIUM CHLORIDE, CALCIUM CHLORIDE 600; 310; 30; 20 MG/100ML; MG/100ML; MG/100ML; MG/100ML
INJECTION, SOLUTION INTRAVENOUS CONTINUOUS
Status: DISCONTINUED | OUTPATIENT
Start: 2023-07-21 | End: 2023-07-21 | Stop reason: HOSPADM

## 2023-07-21 RX ORDER — MORPHINE SULFATE 4 MG/ML
1 INJECTION INTRAVENOUS
Status: DISCONTINUED | OUTPATIENT
Start: 2023-07-21 | End: 2023-07-21 | Stop reason: HOSPADM

## 2023-07-21 RX ORDER — CEFAZOLIN SODIUM 1 G/3ML
INJECTION, POWDER, FOR SOLUTION INTRAMUSCULAR; INTRAVENOUS PRN
Status: DISCONTINUED | OUTPATIENT
Start: 2023-07-21 | End: 2023-07-21 | Stop reason: SURG

## 2023-07-21 RX ORDER — ACETAMINOPHEN 500 MG
1000 TABLET ORAL ONCE
Status: COMPLETED | OUTPATIENT
Start: 2023-07-21 | End: 2023-07-21

## 2023-07-21 RX ORDER — MAGNESIUM SULFATE HEPTAHYDRATE 40 MG/ML
INJECTION, SOLUTION INTRAVENOUS PRN
Status: DISCONTINUED | OUTPATIENT
Start: 2023-07-21 | End: 2023-07-21 | Stop reason: SURG

## 2023-07-21 RX ORDER — OXYCODONE HYDROCHLORIDE 5 MG/1
5 TABLET ORAL EVERY 4 HOURS PRN
Qty: 30 TABLET | Refills: 0 | Status: SHIPPED | OUTPATIENT
Start: 2023-07-21 | End: 2023-07-26

## 2023-07-21 RX ORDER — BUPIVACAINE HYDROCHLORIDE AND EPINEPHRINE 2.5; 5 MG/ML; UG/ML
INJECTION, SOLUTION EPIDURAL; INFILTRATION; INTRACAUDAL; PERINEURAL
Status: DISCONTINUED | OUTPATIENT
Start: 2023-07-21 | End: 2023-07-21 | Stop reason: HOSPADM

## 2023-07-21 RX ORDER — DOCUSATE SODIUM 250 MG
250 CAPSULE ORAL 2 TIMES DAILY
COMMUNITY
End: 2024-02-07

## 2023-07-21 RX ORDER — MEPERIDINE HYDROCHLORIDE 25 MG/ML
12.5 INJECTION INTRAMUSCULAR; INTRAVENOUS; SUBCUTANEOUS
Status: DISCONTINUED | OUTPATIENT
Start: 2023-07-21 | End: 2023-07-21 | Stop reason: HOSPADM

## 2023-07-21 RX ORDER — OXYCODONE HCL 5 MG/5 ML
10 SOLUTION, ORAL ORAL
Status: COMPLETED | OUTPATIENT
Start: 2023-07-21 | End: 2023-07-21

## 2023-07-21 RX ORDER — OXYCODONE HCL 5 MG/5 ML
5 SOLUTION, ORAL ORAL
Status: COMPLETED | OUTPATIENT
Start: 2023-07-21 | End: 2023-07-21

## 2023-07-21 RX ORDER — ROCURONIUM BROMIDE 10 MG/ML
INJECTION, SOLUTION INTRAVENOUS PRN
Status: DISCONTINUED | OUTPATIENT
Start: 2023-07-21 | End: 2023-07-21 | Stop reason: SURG

## 2023-07-21 RX ORDER — DIPHENHYDRAMINE HYDROCHLORIDE 50 MG/ML
12.5 INJECTION INTRAMUSCULAR; INTRAVENOUS
Status: DISCONTINUED | OUTPATIENT
Start: 2023-07-21 | End: 2023-07-21 | Stop reason: HOSPADM

## 2023-07-21 RX ORDER — LIDOCAINE HYDROCHLORIDE 20 MG/ML
INJECTION, SOLUTION EPIDURAL; INFILTRATION; INTRACAUDAL; PERINEURAL PRN
Status: DISCONTINUED | OUTPATIENT
Start: 2023-07-21 | End: 2023-07-21 | Stop reason: SURG

## 2023-07-21 RX ORDER — ONDANSETRON 2 MG/ML
INJECTION INTRAMUSCULAR; INTRAVENOUS PRN
Status: DISCONTINUED | OUTPATIENT
Start: 2023-07-21 | End: 2023-07-21 | Stop reason: SURG

## 2023-07-21 RX ORDER — DEXAMETHASONE SODIUM PHOSPHATE 4 MG/ML
INJECTION, SOLUTION INTRA-ARTICULAR; INTRALESIONAL; INTRAMUSCULAR; INTRAVENOUS; SOFT TISSUE PRN
Status: DISCONTINUED | OUTPATIENT
Start: 2023-07-21 | End: 2023-07-21 | Stop reason: SURG

## 2023-07-21 RX ORDER — MIDAZOLAM HYDROCHLORIDE 1 MG/ML
INJECTION INTRAMUSCULAR; INTRAVENOUS PRN
Status: DISCONTINUED | OUTPATIENT
Start: 2023-07-21 | End: 2023-07-21 | Stop reason: HOSPADM

## 2023-07-21 RX ORDER — ONDANSETRON 4 MG/1
4 TABLET, ORALLY DISINTEGRATING ORAL EVERY 6 HOURS PRN
Qty: 10 TABLET | Refills: 0 | Status: SHIPPED | OUTPATIENT
Start: 2023-07-21 | End: 2023-08-03

## 2023-07-21 RX ORDER — ONDANSETRON 2 MG/ML
4 INJECTION INTRAMUSCULAR; INTRAVENOUS
Status: COMPLETED | OUTPATIENT
Start: 2023-07-21 | End: 2023-07-21

## 2023-07-21 RX ORDER — MORPHINE SULFATE 4 MG/ML
2 INJECTION INTRAVENOUS
Status: DISCONTINUED | OUTPATIENT
Start: 2023-07-21 | End: 2023-07-21 | Stop reason: HOSPADM

## 2023-07-21 RX ADMIN — FENTANYL CITRATE 50 MCG: 50 INJECTION, SOLUTION INTRAMUSCULAR; INTRAVENOUS at 08:28

## 2023-07-21 RX ADMIN — PROPOFOL 200 MG: 10 INJECTION, EMULSION INTRAVENOUS at 07:27

## 2023-07-21 RX ADMIN — SUGAMMADEX 200 MG: 100 INJECTION, SOLUTION INTRAVENOUS at 08:08

## 2023-07-21 RX ADMIN — FENTANYL CITRATE 100 MCG: 50 INJECTION, SOLUTION INTRAMUSCULAR; INTRAVENOUS at 07:27

## 2023-07-21 RX ADMIN — CEFAZOLIN 2 G: 1 INJECTION, POWDER, FOR SOLUTION INTRAMUSCULAR; INTRAVENOUS at 07:34

## 2023-07-21 RX ADMIN — FENTANYL CITRATE 50 MCG: 50 INJECTION, SOLUTION INTRAMUSCULAR; INTRAVENOUS at 07:50

## 2023-07-21 RX ADMIN — ACETAMINOPHEN 1000 MG: 500 TABLET, FILM COATED ORAL at 06:36

## 2023-07-21 RX ADMIN — ONDANSETRON 4 MG: 2 INJECTION INTRAMUSCULAR; INTRAVENOUS at 08:08

## 2023-07-21 RX ADMIN — LIDOCAINE HYDROCHLORIDE 100 MG: 20 INJECTION, SOLUTION EPIDURAL; INFILTRATION; INTRACAUDAL at 07:27

## 2023-07-21 RX ADMIN — OXYCODONE HYDROCHLORIDE 10 MG: 5 SOLUTION ORAL at 08:27

## 2023-07-21 RX ADMIN — SODIUM CHLORIDE, POTASSIUM CHLORIDE, SODIUM LACTATE AND CALCIUM CHLORIDE: 600; 310; 30; 20 INJECTION, SOLUTION INTRAVENOUS at 06:54

## 2023-07-21 RX ADMIN — ONDANSETRON 4 MG: 2 INJECTION INTRAMUSCULAR; INTRAVENOUS at 08:28

## 2023-07-21 RX ADMIN — ROCURONIUM BROMIDE 50 MG: 50 INJECTION, SOLUTION INTRAVENOUS at 07:27

## 2023-07-21 RX ADMIN — MIDAZOLAM 2 MG: 1 INJECTION, SOLUTION INTRAMUSCULAR; INTRAVENOUS at 07:24

## 2023-07-21 RX ADMIN — LIDOCAINE HYDROCHLORIDE 0.5 ML: 10 INJECTION, SOLUTION EPIDURAL; INFILTRATION; INTRACAUDAL at 06:35

## 2023-07-21 RX ADMIN — DEXAMETHASONE SODIUM PHOSPHATE 4 MG: 4 INJECTION INTRA-ARTICULAR; INTRALESIONAL; INTRAMUSCULAR; INTRAVENOUS; SOFT TISSUE at 07:40

## 2023-07-21 RX ADMIN — FENTANYL CITRATE 50 MCG: 50 INJECTION, SOLUTION INTRAMUSCULAR; INTRAVENOUS at 08:49

## 2023-07-21 RX ADMIN — MAGNESIUM SULFATE HEPTAHYDRATE 2 G: 2 INJECTION, SOLUTION INTRAVENOUS at 07:50

## 2023-07-21 RX ADMIN — FENTANYL CITRATE 50 MCG: 50 INJECTION, SOLUTION INTRAMUSCULAR; INTRAVENOUS at 08:39

## 2023-07-21 ASSESSMENT — FIBROSIS 4 INDEX
FIB4 SCORE: 0.49
FIB4 SCORE: 0.49

## 2023-07-21 ASSESSMENT — PAIN DESCRIPTION - PAIN TYPE
TYPE: SURGICAL PAIN
TYPE: CHRONIC PAIN
TYPE: SURGICAL PAIN
TYPE: SURGICAL PAIN

## 2023-07-21 NOTE — OP REPORT
NAME:  Kristin Balderrama  MRN:  [id number]  :  1990      DATE OF OPERATION: 2023    PREOPERATIVE DIAGNOSIS: right Inguinal Hernia    POSTOPERATIVE DIAGNOSIS: right Inguinal Hernia    OPERATION PERFORMED: Laparoscopic repair of right Inguinal Hernia with Mesh    SURGEON: Joe Noyola MD    ASSISTANT:  Mckenzie Oconnor PA-C, PA-C    ANESTHESIOLOGIST:  Anesthesiologist: Bg Luther M.D.    ANESTHESIA: General endotracheal anesthesia.      SPECIMEN: None    ESTIMATED BLOOD LOSS: < 5cc.     INDICATIONS: The patient is a 32 y.o. female with a diagnosis of right groin bulge with right inguinal hernia. She is taken to the operating room today for laparoscopic repair of inguinal hernia with mesh. She has radiographic confirmation of the right inguinal hernia but pain out of proportion to the findings. We have discussed prolonged healing time, physical therapy and pain management following the surgical repair to resolve the groin strain/ligamentous pain.    PROCEDURE: Following informed consent, the patient was properly identified, taken to the operating room, and placed in the supine position where general endotracheal anesthesia was administered. Intravenous antibiotics were administered by the anesthesiologist in the correct time interval. Sequential compression devices were employed. The abdomen was prepped and draped into a sterile field.     A small infraumbilical skin incision was made and dissection was carried to the right of midline.  With some careful effort, dissecting through the subcutaneous tissues, the anterior rectus abdominus fascia sheath was exposed and incised.  The preperitoneal plane was developed bluntly.  The Covidien dissecting trocar balloon instrument was then introduced into the retrorectus/preperitoneal space.  The hand pump was then used to create the preperitoneal dissection.    The balloon trocar was then removed and the CO2 insufflation and optical trocar was then advanced.   The CO2 insufflation was started and the laparoscope was introduced. This demonstrated a nice preperitoneal dissection.    Two additional 5mm trocars were then placed at midline just below the umbilicus.  Blunt dissectors were used to then dissect the right inguinal floor.  A small sized indirect inguinal hernia containing fat was present with the hernia sac extending up through the inguinal canal.  The peritoneum was slowly reduced and gradually .  The cord lipoma was also reduced.  The round ligament was controlled with the enseal device and divided. The vessels and structures were carefully protected and a nice inguinal floor dissection was accomplished.    The right medium lightweight contour mesh was soaked in Kantrex solution.  The mesh was then rolled and introduced into the preperitoneal space.  It was then maneuvered into position.  The mesh was then secured to the periosteum along Jose's ligament and the pubic ramus with the absorbable tacking instrument.  It had very nice position obliterating the hernia defect without constriction of any structures.            The mesh was then held into place with the pneumoperitoneum allowed to escape.  The port were then removed under direct vision.  The port sites were then irrigated well.  The fascia was closed with O Vicryl suture.  The port site skin incisions were closed with interrupted 4-0 Vicryl subcuticular sutures.  Steri-Strips and Benzoin were applied beneath sterile Band-Aids.     The patient tolerated the procedure well and there were no apparent complications. All sponge, needle, and instrument counts were correct on 2 separate occasions. She was awakened, extubated, and transferred to the recovery room in satisfactory condition.       ____________________________________   Joe Noyola MD  DD: 7/21/2023  10:20 AM    CC:  [facility];

## 2023-07-21 NOTE — ANESTHESIA TIME REPORT
Anesthesia Start and Stop Event Times     Date Time Event    7/21/2023 0723 Ready for Procedure     0723 Anesthesia Start     0821 Anesthesia Stop        Responsible Staff  07/21/23    Name Role Begin End    Bg Luther M.D. Anesth 0723 0821        Overtime Reason:  no overtime (within assigned shift)    Comments:

## 2023-07-21 NOTE — OR NURSING
@8298 PT arrived to Phase 2. Pt has complaints of 7/10 pain. Pt states pain is tolerable. PT mobilized from gurney to chair. Vital signs stable. Discharge instructions discussed with patient at patients bedside. PT verbalizes understanding. PIV removed.    @1007 Pt assisted with getting dressed by CNA at bedside. Pt discharged to home with all belongings.

## 2023-07-21 NOTE — OR NURSING
Patient arrived in stable condition. Sites to abdomen with some bleeding. Medicated with IV and oral pain medications. Taking po well. Grandmother called @ 0920 and will be here at 0945 to . Patient states her pain is tolerable. Taken to Stage2 in stable condition.

## 2023-07-21 NOTE — DISCHARGE INSTRUCTIONS
HOME CARE INSTRUCTIONS    ACTIVITY: Rest and take it easy for the first 24 hours.  A responsible adult is recommended to remain with you during that time.  It is normal to feel sleepy.  We encourage you to not do anything that requires balance, judgment or coordination.    FOR 24 HOURS DO NOT:  Drive, operate machinery or run household appliances.  Drink beer or alcoholic beverages.  Make important decisions or sign legal documents.    SPECIAL INSTRUCTIONS: Hernia Repair Discharge Instructions:    1. ACTIVITIES: Upon discharge from the hospital, the day of surgery it is requested that you do no significant physical activity and limit mental activities, as you have had sedation. The day after surgery, you may resume activities of daily living, but for 6 weeks, it is recommended that you do no strenuous activities or heavy lifting (greater than 20 pounds).     2. DRIVING: You may drive whenever you are off pain medications and are able to perform the activities needed to drive, i.e. turning, bending, twisting, etc.     3. WOUND: It is not unusual for patients to experience swelling and even bruising at the hernia repair site.     4. ICE: please use ice on the wound to decrease the swelling for the first 24 hours and then discontinue.     5. BATHING: The dressing can be removed two days after surgery. You should leave the steri-strips in place, they will fall off over 5-7 days. You may then allow the wound to get wet in a shower 2 days after surgery, but avoid submersion in water (tub bath) for at least a week.    6. PAIN MEDICATION: You will be given a prescription for pain medication at discharge. Please take these as directed. It is important to remember not to take medications on an empty stomach as this may cause nausea.     7. BOWEL FUNCTION: After hernia repair, it is not uncommon for patients to experience constipation. This is due to decreasing activity levels as well as pain medications. You may wish to use a  stool softener beginning immediately after surgery, and you may or may not need to use a laxative (Miralax, Milk of Magnesia, Senokot, etc.) as well.     8. CALL IF YOU HAVE: (1) Fevers to more than 101.00 F, (2) Unusual chest or leg pain, (3) Drainage or fluid from incision that may be foul smelling, increased tenderness or soreness at the wound or the wound edges are no longer together, redness or swelling at the incision site. Please do not hesitate to call with any other questions.     9. APPOINTMENT: Contact our office at [phone] for a follow-up appointment in 1 to 2 weeks following your procedure.     If you have any additional questions, please do not hesitate to call the office and speak to either a medical assistant or the provider on call.     Office address:  [address] [place] [address], [address] [address]    Joe Noyola M.D.  [facility]  [phone]    DIET: To avoid nausea, slowly advance diet as tolerated, avoiding spicy or greasy foods for the first day.  Add more substantial food to your diet according to your physician's instructions.  Babies can be fed formula or breast milk as soon as they are hungry.  INCREASE FLUIDS AND FIBER TO AVOID CONSTIPATION.    MEDICATIONS: Resume taking daily medication.  Take prescribed pain medication with food.  If no medication is prescribed, you may take non-aspirin pain medication if needed.  PAIN MEDICATION CAN BE VERY CONSTIPATING.  Take a stool softener or laxative such as senokot, pericolace, or milk of magnesia if needed.    Prescription given for Zofran, Oxycodone.  Last pain medication given at 0827 AM.    A follow-up appointment should be arranged with your doctor in 1-2 Weeks; call to schedule.    You should CALL YOUR PHYSICIAN if you develop:  Fever greater than 101 degrees F.  Pain not relieved by medication, or persistent nausea or vomiting.  Excessive bleeding (blood soaking through dressing) or unexpected drainage from the  wound.  Extreme redness or swelling around the incision site, drainage of pus or foul smelling drainage.  Inability to urinate or empty your bladder within 8 hours.  Problems with breathing or chest pain.    You should call 911 if you develop problems with breathing or chest pain.  If you are unable to contact your doctor or surgical center, you should go to the nearest emergency room or urgent care center.      Physician's telephone #: [phone] Dr. Noyola    MILD FLU-LIKE SYMPTOMS ARE NORMAL.  YOU MAY EXPERIENCE GENERALIZED MUSCLE ACHES, THROAT IRRITATION, HEADACHE AND/OR SOME NAUSEA.    If any questions arise, call your doctor.  If your doctor is not available, please feel free to call the Surgical Center at [phone].  The Center is open Monday through Friday from 7AM to 7PM.      A registered nurse may call you a few days after your surgery to see how you are doing after your procedure.    You may also receive a survey in the mail within the next two weeks and we ask that you take a few moments to complete the survey and return it to us.  Our goal is to provide you with very good care and we value your comments.     Depression / Suicide Risk    As you are discharged from this [facility] facility, it is important to learn how to keep safe from harming yourself.    Recognize the warning signs:  Abrupt changes in personality, positive or negative- including increase in energy   Giving away possessions  Change in eating patterns- significant weight changes-  positive or negative  Change in sleeping patterns- unable to sleep or sleeping all the time   Unwillingness or inability to communicate  Depression  Unusual sadness, discouragement and loneliness  Talk of wanting to die  Neglect of personal appearance   Rebelliousness- reckless behavior  Withdrawal from people/activities they love  Confusion- inability to concentrate     If you or a loved one observes any of these behaviors or has concerns about  self-harm, here's what you can do:  Talk about it- your feelings and reasons for harming yourself  Remove any means that you might use to hurt yourself (examples: pills, rope, extension cords, firearm)  Get professional help from the community (Mental Health, Substance Abuse, psychological counseling)  Do not be alone:Call your Safe Contact- someone whom you trust who will be there for you.  Call your local CRISIS HOTLINE 711-7205 or 926-874-1863  Call your local Children's Mobile Crisis Response Team [place] (266) 726-1331 or www.[facility] [facility]  Call the toll free National Suicide Prevention Hotlines   National Suicide Prevention Lifeline 152-302-SMZQ (4163)  National Hope Line Network 800-SUICIDE (937-4599)    I acknowledge receipt and understanding of these Home Care instructions.

## 2023-07-21 NOTE — ANESTHESIA PREPROCEDURE EVALUATION
Case: [id number] Date/Time: 07/21/23 0715    Procedure: LAPAROSCOPIC RIGHT INGUINAL HERNIA REPAIR WITH MESH (Right: Abdomen)    Anesthesia type: General    Pre-op diagnosis: RIGHT INGUINAL HERNIA    Location: [facility] OR 10 / SURGERY [facility]    Surgeons: Joe Noyola M.D.          Relevant Problems   ANESTHESIA   (positive) TONYA (obstructive sleep apnea)      PULMONARY   (positive) Asthma exacerbation   (positive) Mild intermittent asthma without complication   (positive) Moderate asthma   (positive) Moderate persistent asthma with acute exacerbation      NEURO   (positive) Hemiplegic 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, not intractable   (positive) Migraine, hemiplegic   (positive) Seizures (HCC)      CARDIAC   (positive) Hemiplegic 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, not intractable   (positive) Inappropriate sinus tachycardia   (positive) Migraine, hemiplegic   (positive) SVT (supraventricular tachycardia) (HCC)      GI   (positive) GERD (gastroesophageal reflux disease)         (positive) Fatty liver disease, nonalcoholic       Physical Exam    Airway   Mallampati: II  TM distance: >3 FB  Neck ROM: full       Cardiovascular - normal exam  Rhythm: regular  Rate: normal     Dental - normal exam           Pulmonary - normal exam     Abdominal    Neurological - normal exam                 Anesthesia Plan    ASA 3   ASA physical status 3 criteria: morbid obesity - BMI greater than or equal to 40    Plan - general       Airway plan will be ETT          Induction: intravenous    Postoperative Plan: Postoperative administration of opioids is intended.    Pertinent diagnostic labs and testing reviewed    Informed Consent:    Anesthetic plan and risks discussed with patient.    Use of blood products discussed with: patient whom consented to blood products.

## 2023-07-21 NOTE — ANESTHESIA POSTPROCEDURE EVALUATION
Patient: Kristin Balderrama    Procedure Summary     Date: 07/21/23 Room / Location: [place] [address] / SURGERY [facility]    Anesthesia Start: 0723 Anesthesia Stop: 0821    Procedure: LAPAROSCOPIC RIGHT INGUINAL HERNIA REPAIR WITH MESH (Right: Abdomen) Diagnosis: (RIGHT INGUINAL HERNIA)    Surgeons: Joe Noyola M.D. Responsible Provider: Bg Luther M.D.    Anesthesia Type: general ASA Status: 3          Final Anesthesia Type: general  Last vitals  BP   Blood Pressure: (!) 147/70    Temp   35.9 °C (96.6 °F)    Pulse   76   Resp   (!) 21    SpO2   95 %      Anesthesia Post Evaluation    Patient location during evaluation: PACU  Patient participation: complete - patient participated  Level of consciousness: awake and alert    Airway patency: patent  Anesthetic complications: no  Cardiovascular status: hemodynamically stable  Respiratory status: acceptable  Hydration status: euvolemic    PONV: none          No notable events documented.     Nurse Pain Score: 6 (NPRS)

## 2023-07-21 NOTE — ANESTHESIA PROCEDURE NOTES
Airway    Date/Time: 7/21/2023 7:29 AM    Performed by: Bg Luther M.D.  Authorized by: Bg Luther M.D.    Location:  OR  Urgency:  Elective  Indications for Airway Management:  Anesthesia      Spontaneous Ventilation: absent    Sedation Level:  Deep  Preoxygenated: Yes    Patient Position:  Sniffing  Final Airway Type:  Endotracheal airway  Final Endotracheal Airway:  ETT  Cuffed: Yes    Technique Used for Successful ETT Placement:  Direct laryngoscopy    Insertion Site:  Oral  Blade Type:  [place]  Laryngoscope Blade/Videolaryngoscope Blade Size:  3  ETT Size (mm):  7.0  Measured from:  Teeth  ETT to Teeth (cm):  21  Placement Verified by: auscultation and capnometry    Cormack-Lehane Classification:  Grade IIa - partial view of glottis  Number of Attempts at Approach:  1

## 2023-07-25 ENCOUNTER — HOSPITAL ENCOUNTER (EMERGENCY)
Facility: MEDICAL CENTER | Age: 33
End: 2023-07-25
Attending: EMERGENCY MEDICINE
Payer: MEDICARE

## 2023-07-25 VITALS
DIASTOLIC BLOOD PRESSURE: 68 MMHG | RESPIRATION RATE: 18 BRPM | WEIGHT: 238.1 LBS | BODY MASS INDEX: 40.87 KG/M2 | OXYGEN SATURATION: 98 % | SYSTOLIC BLOOD PRESSURE: 126 MMHG | TEMPERATURE: 97.7 F | HEART RATE: 71 BPM

## 2023-07-25 DIAGNOSIS — G89.18 POST-OP PAIN: ICD-10-CM

## 2023-07-25 LAB
ALBUMIN SERPL BCP-MCNC: 4.1 G/DL (ref 3.2–4.9)
ALBUMIN/GLOB SERPL: 1.9 G/DL
ALP SERPL-CCNC: 99 U/L (ref 30–99)
ALT SERPL-CCNC: 238 U/L (ref 2–50)
ANION GAP SERPL CALC-SCNC: 11 MMOL/L (ref 7–16)
AST SERPL-CCNC: 162 U/L (ref 12–45)
BASOPHILS # BLD AUTO: 1.1 % (ref 0–1.8)
BASOPHILS # BLD: 0.05 K/UL (ref 0–0.12)
BILIRUB SERPL-MCNC: 0.4 MG/DL (ref 0.1–1.5)
BUN SERPL-MCNC: 16 MG/DL (ref 8–22)
CALCIUM ALBUM COR SERPL-MCNC: 9.1 MG/DL (ref 8.5–10.5)
CALCIUM SERPL-MCNC: 9.2 MG/DL (ref 8.5–10.5)
CHLORIDE SERPL-SCNC: 108 MMOL/L (ref 96–112)
CO2 SERPL-SCNC: 18 MMOL/L (ref 20–33)
CREAT SERPL-MCNC: 0.71 MG/DL (ref 0.5–1.4)
EOSINOPHIL # BLD AUTO: 0.17 K/UL (ref 0–0.51)
EOSINOPHIL NFR BLD: 3.6 % (ref 0–6.9)
ERYTHROCYTE [DISTWIDTH] IN BLOOD BY AUTOMATED COUNT: 39.7 FL (ref 35.9–50)
GFR SERPLBLD CREATININE-BSD FMLA CKD-EPI: 115 ML/MIN/1.73 M 2
GLOBULIN SER CALC-MCNC: 2.2 G/DL (ref 1.9–3.5)
GLUCOSE SERPL-MCNC: 119 MG/DL (ref 65–99)
HCG SERPL QL: NEGATIVE
HCT VFR BLD AUTO: 40.5 % (ref 37–47)
HGB BLD-MCNC: 13.8 G/DL (ref 12–16)
IMM GRANULOCYTES # BLD AUTO: 0.02 K/UL (ref 0–0.11)
IMM GRANULOCYTES NFR BLD AUTO: 0.4 % (ref 0–0.9)
LIPASE SERPL-CCNC: 26 U/L (ref 11–82)
LYMPHOCYTES # BLD AUTO: 1.87 K/UL (ref 1–4.8)
LYMPHOCYTES NFR BLD: 39.5 % (ref 22–41)
MCH RBC QN AUTO: 30.8 PG (ref 27–33)
MCHC RBC AUTO-ENTMCNC: 34.1 G/DL (ref 32.2–35.5)
MCV RBC AUTO: 90.4 FL (ref 81.4–97.8)
MONOCYTES # BLD AUTO: 0.36 K/UL (ref 0–0.85)
MONOCYTES NFR BLD AUTO: 7.6 % (ref 0–13.4)
NEUTROPHILS # BLD AUTO: 2.26 K/UL (ref 1.82–7.42)
NEUTROPHILS NFR BLD: 47.8 % (ref 44–72)
NRBC # BLD AUTO: 0 K/UL
NRBC BLD-RTO: 0 /100 WBC (ref 0–0.2)
PLATELET # BLD AUTO: 175 K/UL (ref 164–446)
PMV BLD AUTO: 10.9 FL (ref 9–12.9)
POTASSIUM SERPL-SCNC: 4.4 MMOL/L (ref 3.6–5.5)
PROT SERPL-MCNC: 6.3 G/DL (ref 6–8.2)
RBC # BLD AUTO: 4.48 M/UL (ref 4.2–5.4)
SODIUM SERPL-SCNC: 137 MMOL/L (ref 135–145)
WBC # BLD AUTO: 4.7 K/UL (ref 4.8–10.8)

## 2023-07-25 PROCEDURE — 80053 COMPREHEN METABOLIC PANEL: CPT

## 2023-07-25 PROCEDURE — 84703 CHORIONIC GONADOTROPIN ASSAY: CPT

## 2023-07-25 PROCEDURE — 83690 ASSAY OF LIPASE: CPT

## 2023-07-25 PROCEDURE — 36415 COLL VENOUS BLD VENIPUNCTURE: CPT

## 2023-07-25 PROCEDURE — 85025 COMPLETE CBC W/AUTO DIFF WBC: CPT

## 2023-07-25 PROCEDURE — 99283 EMERGENCY DEPT VISIT LOW MDM: CPT

## 2023-07-25 ASSESSMENT — LIFESTYLE VARIABLES
HAVE PEOPLE ANNOYED YOU BY CRITICIZING YOUR DRINKING: NO
CONSUMPTION TOTAL: NEGATIVE
DO YOU DRINK ALCOHOL: NO
HAVE YOU EVER FELT YOU SHOULD CUT DOWN ON YOUR DRINKING: NO
TOTAL SCORE: 0
ON A TYPICAL DAY WHEN YOU DRINK ALCOHOL HOW MANY DRINKS DO YOU HAVE: 0
EVER HAD A DRINK FIRST THING IN THE MORNING TO STEADY YOUR NERVES TO GET RID OF A HANGOVER: NO
HOW MANY TIMES IN THE PAST YEAR HAVE YOU HAD 5 OR MORE DRINKS IN A DAY: 0
TOTAL SCORE: 0
TOTAL SCORE: 0
EVER FELT BAD OR GUILTY ABOUT YOUR DRINKING: NO
AVERAGE NUMBER OF DAYS PER WEEK YOU HAVE A DRINK CONTAINING ALCOHOL: 0

## 2023-07-25 ASSESSMENT — FIBROSIS 4 INDEX: FIB4 SCORE: 0.49

## 2023-07-25 ASSESSMENT — PAIN DESCRIPTION - PAIN TYPE: TYPE: ACUTE PAIN

## 2023-07-25 NOTE — ED TRIAGE NOTES
Chief Complaint   Patient presents with    Wound Check     GLF yesterday. Hernia surgery 7/21. Increased ABD pain post fall, increasing in drainage per pt.         BP (!) 144/88   Pulse 77   Temp 37 °C (98.6 °F) (Temporal)   Resp 16   LMP  (LMP Unknown)   SpO2 98%

## 2023-07-25 NOTE — ED PROVIDER NOTES
" ED PHYSICIAN NOTE    CHIEF COMPLAINT  Chief Complaint   Patient presents with    Wound Check     GLF yesterday. Hernia surgery 7/21. Increased ABD pain post fall, increasing in drainage per pt.        EXTERNAL RECORDS REVIEWED  Inpatient Notes patient underwent upper scopic right inguinal hernia repair 7/21 by Dr. Siena JUNG/SUSIE    Kristin Balderrama is a 32 y.o. female who presents with abdominal pain.  Patient has a history of POTS.  Said she felt lightheaded when she stood up.  She fell to the ground.  She had a right abdomen.  She had recent surgery and reports that since the fall her abdominal pain was worse and she had some leakage of clear fluid from her umbilical wound.  She has not had fever vomiting diarrhea.  No new skin rash.    PAST MEDICAL HISTORY  Past Medical History:   Diagnosis Date    Abdominal pain     Anginal syndrome     random chest pain especially with tachycardia    Apnea, sleep     Arrhythmia     \"sinus tachycardia\", cariologist, Dr. Kumar; ablation 2/2016    Arthritis     osteo    ASTHMA     when around smoke    Back pain     Borderline personality disorder (HCC)     Breath shortness     with tachycardia    Bronchitis 02/08/2022    Last time was 12/21    Cardiac arrhythmia     Chickenpox     Chronic UTI 09/18/2010    Cough     Daytime sleepiness     Dental disorder 03/08/2021    infected tooth    Depression     Diabetes (HCC)     Diarrhea     incontinece    Disorder of thyroid     Hashimoto's    Fall     Fatigue     Frequent headaches     Gasping for breath     Gynecological disorder     PCOS    Headache(784.0)     Heart burn     Heart murmur     History of falling     Indigestion     Migraine     Mitochondrial disease (HCC)     Multiple personality disorder (HCC)     Nausea     Obesity     Other fatigue 06/29/2020    Pain 08/15/2012    back, 7/10    Painful joint     PCOS (polycystic ovarian syndrome)     Pneumonia 2012 12/2020    POTS (postural orthostatic tachycardia syndrome)     " "Psychosis (HCC)     Ringing in ears     Scoliosis     Shortness of breath     O2 as needed    Sinus tachycardia 10/31/2013    Sleep apnea     CPAP \"pulmonary doctor took me off mid year 2016\"    Snoring     Tonsillitis     Transverse myelitis (HCC)     2/8/22: Per pt: not anymore    Tuberculosis     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    Urinary bladder disorder     Suprapubic cath. 2/8/22: Not anymore.     Urinary incontinence     Weakness     Wears glasses        SOCIAL HISTORY  Social History     Tobacco Use    Smoking status: Never    Smokeless tobacco: Never   Vaping Use    Vaping Use: Never used   Substance Use Topics    Alcohol use: No     Alcohol/week: 0.0 oz    Drug use: Not Currently     Frequency: 7.0 times per week     Types: Marijuana, Oral       CURRENT MEDICATIONS  Home Medications    **Home medications have not yet been reviewed for this encounter**         ALLERGIES  Allergies   Allergen Reactions    Cefdinir Shortness of Breath and Itching     Tolerated 1/18/17  Tolerates ceftriaxone  Tolerated augmentin 8/2019     Depakote [Divalproex Sodium] Unspecified     Muscle spasms/muscle pain and weakness      Doxycycline Anaphylaxis and Vomiting     Other reaction(s): pustules/blisters  Other reaction(s): stomach pain    Montelukast [Singulair] Unspecified     Cardiac effusion    Vancomycin Itching     Pt becomes flushed in face and chest.   RXN=7/10/16    Wound Dressing Adhesive Rash     By pt report-\"removes skin totally off\"    Amitriptyline Unspecified     Headaches      Aripiprazole Unspecified    Aripiprazole [Abilify] Unspecified     Headaches/muscle twitching      Clindamycin Nausea         Other reaction(s): nausea stomach pain    Flomax [Tamsulosin Hydrochloride] Swelling    Levaquin Unspecified     Severe muscle cramps in legs  RXN=unknown  Other reaction(s): leg muscle cramps    Metformin Unspecified     Increased lactic acid     Other reaction(s): itching and rash/nausea vomiting    " "Tamsulosin Swelling     Swelling of legs    Tape Rash     Tears skin off  coban with Tegaderm tape ok intermittently  RXN=ongoing    Amoxicillin Rash    Depakote [Divalproex Sodium]     Erythromycin Rash     .  Other reaction(s): nausea stomach pain    Hydromorphone      Other reaction(s): vomiting    Montelukast     Ampicillin Rash     Pt reports that she received a rash     Ciprofloxacin Rash          Keflex Rash     Pt states she gets a rash with this medication  Tolerates ceftriaxone  Can take with Benadryl    Levofloxacin Unspecified     Leg muscle cramps    Metronidazole Rash     \"Vision problems\"  Other reaction(s): vision problems    Penicillins Hives     Can take with Benadryl    Sulfa Drugs Itching and Myalgia     Muscle pain and weakness  Other reaction(s): unknown    Valproic Acid Rash     .       PHYSICAL EXAM  VITAL SIGNS: /68   Pulse 71   Temp 36.5 °C (97.7 °F) (Temporal)   Resp 18   Wt 108 kg (238 lb 1.6 oz)   LMP  (LMP Unknown)   SpO2 98%   BMI 40.87 kg/m²    Constitutional: Awake and alert  HENT: Normal inspection  Eyes: Normal inspection  Neck: Grossly normal range of motion.  Cardiovascular: Normal heart rate, Normal rhythm.  Symmetric peripheral pulses.   Thorax & Lungs: No respiratory distress, No wheezing, No rales, No rhonchi, No chest tenderness.   Abdomen: Laparoscopic wounds are intact without brandon dehiscence.  Slight ecchymosis adjacent to the wounds.  There is mild diffuse tenderness.  No rebound or peritonitis.  Skin: No evidence of wound infection  Back: No tenderness, No CVA tenderness.       DIAGNOSTIC STUDIES / PROCEDURES  LABS/EKG  Results for orders placed or performed during the hospital encounter of 07/25/23   CBC WITH DIFFERENTIAL   Result Value Ref Range    WBC 4.7 (L) 4.8 - 10.8 K/uL    RBC 4.48 4.20 - 5.40 M/uL    Hemoglobin 13.8 12.0 - 16.0 g/dL    Hematocrit 40.5 37.0 - 47.0 %    MCV 90.4 81.4 - 97.8 fL    MCH 30.8 27.0 - 33.0 pg    MCHC 34.1 32.2 - 35.5 g/dL "    RDW 39.7 35.9 - 50.0 fL    Platelet Count 175 164 - 446 K/uL    MPV 10.9 9.0 - 12.9 fL    Neutrophils-Polys 47.80 44.00 - 72.00 %    Lymphocytes 39.50 22.00 - 41.00 %    Monocytes 7.60 0.00 - 13.40 %    Eosinophils 3.60 0.00 - 6.90 %    Basophils 1.10 0.00 - 1.80 %    Immature Granulocytes 0.40 0.00 - 0.90 %    Nucleated RBC 0.00 0.00 - 0.20 /100 WBC    Neutrophils (Absolute) 2.26 1.82 - 7.42 K/uL    Lymphs (Absolute) 1.87 1.00 - 4.80 K/uL    Monos (Absolute) 0.36 0.00 - 0.85 K/uL    Eos (Absolute) 0.17 0.00 - 0.51 K/uL    Baso (Absolute) 0.05 0.00 - 0.12 K/uL    Immature Granulocytes (abs) 0.02 0.00 - 0.11 K/uL    NRBC (Absolute) 0.00 K/uL   COMP METABOLIC PANEL   Result Value Ref Range    Sodium 137 135 - 145 mmol/L    Potassium 4.4 3.6 - 5.5 mmol/L    Chloride 108 96 - 112 mmol/L    Co2 18 (L) 20 - 33 mmol/L    Anion Gap 11.0 7.0 - 16.0    Glucose 119 (H) 65 - 99 mg/dL    Bun 16 8 - 22 mg/dL    Creatinine 0.71 0.50 - 1.40 mg/dL    Calcium 9.2 8.5 - 10.5 mg/dL    Correct Calcium 9.1 8.5 - 10.5 mg/dL    AST(SGOT) 162 (H) 12 - 45 U/L    ALT(SGPT) 238 (H) 2 - 50 U/L    Alkaline Phosphatase 99 30 - 99 U/L    Total Bilirubin 0.4 0.1 - 1.5 mg/dL    Albumin 4.1 3.2 - 4.9 g/dL    Total Protein 6.3 6.0 - 8.2 g/dL    Globulin 2.2 1.9 - 3.5 g/dL    A-G Ratio 1.9 g/dL   HCG QUAL SERUM   Result Value Ref Range    Beta-Hcg Qualitative Serum Negative Negative   LIPASE   Result Value Ref Range    Lipase 26 11 - 82 U/L   ESTIMATED GFR   Result Value Ref Range    GFR (CKD-EPI) 115 >60 mL/min/1.73 m 2     *Note: Due to a large number of results and/or encounters for the requested time period, some results have not been displayed. A complete set of results can be found in Results Review.         COURSE & MEDICAL DECISION MAKING    ED Observation Status? Yes; I am placing the patient in to an observation status due to a diagnostic uncertainty as well as therapeutic intensity. Patient placed in observation status at 9:30 AM,  7/25/2023.     Observation plan is as follows: Obtain diagnostic testing, monitoring, serial exams    Upon Reevaluation, the patient's condition has: Improved; and will be discharged.    Patient discharged from ED Observation status at 12 PM 7/25/2023    INITIAL ASSESSMENT, COURSE AND PLAN  Care Narrative: Patient presents with with abdominal pain.  Had recent surgery.  Her exam is most consistent with postoperative pain.  She had a recent fall.  She has had some drainage from the wound.  There is no evidence of dehiscence.  Her vital signs are normal.  Obtained laboratory data.  She has slight elevation of her LFTs.  Likely related to the recent surgery.  She has no tenderness in the right upper quadrant.  At this point management continues to be symptomatic.  Advised oxycodone at home.  Keep her wounds clean.  Apply ice as needed.  She will return to the ER if she has any fevers, swelling, uncontrolled pain or concern.    ADDITIONAL PROBLEM LIST  Sanz-advised exercise caution when standing particular when taking opiate pain medications    DISPOSITION AND DISCUSSIONS      Escalation of care considered, and ultimately not performed:diagnostic imaging consider CT scan, but there is no evidence of disruption of surgical repair.  Defer to surgeon on recheck.  Advise follow-up this week.    Prescription drugs considered and/or prescribed: Consider prescription analgesic, but patient has oxycodone at home to take.    FINAL IMPRESSION  1.  Postoperative abdominal pain    This dictation was created using voice recognition software. The accuracy of the dictation is limited to the abilities of the software. I expect there may be some errors of grammar and possibly content. The nursing notes were reviewed and certain aspects of this information were incorporated into this note.    Electronically signed by: Renzo Mancia M.D., 7/25/2023

## 2023-07-25 NOTE — ED NOTES
Discharge teaching and paperwork provided regarding post op pain and all questions/concerns answered. VSS,  assessment stable. Given information regarding home care and reasons to follow up with ED or primary MD. Patient discharged to the care of self and wheeled out of the ED.

## 2023-07-26 ENCOUNTER — OFFICE VISIT (OUTPATIENT)
Dept: URGENT CARE | Facility: CLINIC | Age: 33
End: 2023-07-26
Payer: MEDICARE

## 2023-07-26 ENCOUNTER — HOSPITAL ENCOUNTER (OUTPATIENT)
Dept: LAB | Facility: MEDICAL CENTER | Age: 33
End: 2023-07-26
Attending: NEUROLOGICAL SURGERY
Payer: MEDICARE

## 2023-07-26 ENCOUNTER — PRE-ADMISSION TESTING (OUTPATIENT)
Dept: ADMISSIONS | Facility: MEDICAL CENTER | Age: 33
End: 2023-07-26
Attending: NEUROLOGICAL SURGERY
Payer: MEDICARE

## 2023-07-26 VITALS
HEART RATE: 74 BPM | DIASTOLIC BLOOD PRESSURE: 76 MMHG | TEMPERATURE: 96.7 F | SYSTOLIC BLOOD PRESSURE: 128 MMHG | WEIGHT: 238 LBS | BODY MASS INDEX: 40.63 KG/M2 | OXYGEN SATURATION: 99 % | HEIGHT: 64 IN | RESPIRATION RATE: 22 BRPM

## 2023-07-26 DIAGNOSIS — N30.00 ACUTE CYSTITIS WITHOUT HEMATURIA: ICD-10-CM

## 2023-07-26 DIAGNOSIS — R06.02 SOB (SHORTNESS OF BREATH): ICD-10-CM

## 2023-07-26 LAB
APPEARANCE UR: CLEAR
BILIRUB UR STRIP-MCNC: NEGATIVE MG/DL
COLOR UR AUTO: YELLOW
D DIMER PPP IA.FEU-MCNC: <0.27 UG/ML (FEU) (ref 0–0.5)
GLUCOSE UR STRIP.AUTO-MCNC: NEGATIVE MG/DL
KETONES UR STRIP.AUTO-MCNC: NORMAL MG/DL
LEUKOCYTE ESTERASE UR QL STRIP.AUTO: NORMAL
NITRITE UR QL STRIP.AUTO: NEGATIVE
PH UR STRIP.AUTO: 5.5 [PH] (ref 5–8)
POCT INT CON NEG: NEGATIVE
POCT INT CON POS: POSITIVE
POCT URINE PREGNANCY TEST: NEGATIVE
PROT UR QL STRIP: NEGATIVE MG/DL
RBC UR QL AUTO: NEGATIVE
SP GR UR STRIP.AUTO: 1.03
UROBILINOGEN UR STRIP-MCNC: 0.2 MG/DL

## 2023-07-26 PROCEDURE — 85379 FIBRIN DEGRADATION QUANT: CPT

## 2023-07-26 PROCEDURE — 3078F DIAST BP <80 MM HG: CPT | Performed by: NURSE PRACTITIONER

## 2023-07-26 PROCEDURE — 3074F SYST BP LT 130 MM HG: CPT | Performed by: NURSE PRACTITIONER

## 2023-07-26 PROCEDURE — 81002 URINALYSIS NONAUTO W/O SCOPE: CPT | Performed by: NURSE PRACTITIONER

## 2023-07-26 PROCEDURE — 99214 OFFICE O/P EST MOD 30 MIN: CPT | Performed by: NURSE PRACTITIONER

## 2023-07-26 PROCEDURE — 36415 COLL VENOUS BLD VENIPUNCTURE: CPT

## 2023-07-26 PROCEDURE — 81025 URINE PREGNANCY TEST: CPT | Performed by: NURSE PRACTITIONER

## 2023-07-26 RX ORDER — NITROFURANTOIN 25; 75 MG/1; MG/1
100 CAPSULE ORAL 2 TIMES DAILY
Qty: 10 CAPSULE | Refills: 0 | Status: SHIPPED | OUTPATIENT
Start: 2023-07-26 | End: 2023-07-31

## 2023-07-26 ASSESSMENT — ENCOUNTER SYMPTOMS
FLANK PAIN: 0
ABDOMINAL PAIN: 1
SHORTNESS OF BREATH: 1
WHEEZING: 1

## 2023-07-26 ASSESSMENT — FIBROSIS 4 INDEX: FIB4 SCORE: 1.98

## 2023-07-26 NOTE — PATIENT INSTRUCTIONS
-Oral Hydration: Drink plenty of water.  -Take antibiotic as prescribed.  -Follow up with PCP.    Follow up urgently for new or persistent abdominal pain, flank pain, difficulty with urination, fevers, vomiting, weakness, tachycardia, or any other concerns. Emergently for abdominal swelling, increased or persistent shortness of breath, chest pain, persistently elevated heart rate, or dizziness.

## 2023-07-26 NOTE — PROGRESS NOTES
"  Subjective:     Kristin Balderrama is a 32 y.o. female who presents for UTI (X this morning ) and Shortness of Breath (Wheezing, x 2 days )      Presents for UTI symptoms. Cloudy urine started this morning. Also c/o feeling sort of breath. Has used her Duoneb and inhaler. Had a hernia repair last Friday, feels \"she can't cough properly\". Continued abdominal pain post surgery, as she also had a fall, and had gone to the ED yesterday to be seen. States her lungs \"feel full\". No chest pain.       UTI  This is a new problem. The current episode started today. Associated symptoms include abdominal pain and urinary symptoms. Pertinent negatives include no chest pain or fever.   Shortness of Breath  This is a recurrent problem. The current episode started yesterday. Associated symptoms include abdominal pain and wheezing. Pertinent negatives include no chest pain or fever. Her past medical history is significant for asthma.   Asthma  She complains of shortness of breath and wheezing. Pertinent negatives include no chest pain or fever. Her past medical history is significant for asthma.       Past Medical History:   Diagnosis Date    Abdominal pain     Anginal syndrome     random chest pain especially with tachycardia    Apnea, sleep     Arrhythmia     \"sinus tachycardia\", cariologist, Dr. Kumar; ablation 2/2016    Arthritis     osteo    ASTHMA     when around smoke    Back pain     Borderline personality disorder (HCC)     Breath shortness     with tachycardia    Bronchitis 02/08/2022    Last time was 12/21    Cardiac arrhythmia     Chickenpox     Chronic UTI 09/18/2010    Cough     Daytime sleepiness     Dental disorder 03/08/2021    infected tooth    Depression     Diabetes (HCC)     Diarrhea     incontinece    Disorder of thyroid     Hashimoto's    Fall     Fatigue     Frequent headaches     Gasping for breath     Gynecological disorder     PCOS    Headache(784.0)     Heart burn     Heart murmur     History of " "falling     Indigestion     Migraine     Mitochondrial disease (HCC)     Multiple personality disorder (HCC)     Nausea     Obesity     Other fatigue 06/29/2020    Pain 08/15/2012    back, 7/10    Painful joint     PCOS (polycystic ovarian syndrome)     Pneumonia 2012 12/2020    POTS (postural orthostatic tachycardia syndrome)     Psychosis (HCC)     Ringing in ears     Scoliosis     Shortness of breath     O2 as needed    Sinus tachycardia 10/31/2013    Sleep apnea     CPAP \"pulmonary doctor took me off mid year 2016\"    Snoring     Tonsillitis     Transverse myelitis (HCC)     2/8/22: Per pt: not anymore    Tuberculosis     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    Urinary bladder disorder     Suprapubic cath. 2/8/22: Not anymore.     Urinary incontinence     Weakness     Wears glasses        Past Surgical History:   Procedure Laterality Date    INGUINAL HERNIA LAPAROSCOPIC Right 07/21/2023    Procedure: LAPAROSCOPIC RIGHT INGUINAL HERNIA REPAIR WITH MESH;  Surgeon: Joe Noyola M.D.;  Location: [facility];  Service: General    HERNIA REPAIR Right 07/21/2023    PB PERCUT FIX PBOX/NECK FEMUR FX Left 01/28/2023    Procedure: FIXATION, HIP, USING CANNULATED SCREW;  Surgeon: Noman Abdul M.D.;  Location: SURGERY [facility];  Service: Orthopedics    SC LAP,DIAGNOSTIC ABDOMEN  02/14/2022    Procedure: LAPAROSCOPY;  Surgeon: Seamus Pisano M.D.;  Location: SURGERY SAME DAY [facility];  Service: Gynecology    OVARIAN CYSTECTOMY Right 02/14/2022    Procedure: EXCISION, CYST, OVARY;  Surgeon: Seamus Pisano M.D.;  Location: SURGERY SAME DAY [facility];  Service: Gynecology    BOWEL STIMULATOR INSERTION  03/10/2021    Procedure: INSERTION, ELECTRODE LEADS AND PULSE GENERATOR, NEUROSTIMULATOR, SACRAL - REMOVAL OF INTERSTIM WITH REPLACEMENT OF SACRAL NEUROMODULATION DEVICE;  Surgeon: Joe Noyola M.D.;  Location: [facility];  Service: General    MUSCLE BIOPSY Right 01/26/2017    Procedure: " MUSCLE BIOPSY - THIGH;  Surgeon: Isidro Vigil M.D.;  Location: SURGERY [facility];  Service:     GASTROSCOPY WITH BALLOON DILATATION N/A 01/04/2017    Procedure: GASTROSCOPY WITH DILATATION;  Surgeon: Torres Vargas M.D.;  Location: SURGERY [facility];  Service:     BOWEL STIMULATOR INSERTION  07/13/2016    Procedure: BOWEL STIMULATOR INSERTION FOR PERMANENT INTERSTIM SACRAL IMPLANT;  Surgeon: Joe Noyola M.D.;  Location: SURGERY [facility];  Service:     RECOVERY  01/27/2016    Procedure: CATH LAB EP STUDY WITH SINUS NODE MODIFICATION ABHINAV;  Surgeon: Recoveryonly Surgery;  Location: SUR[place] [facility];  Service:     OTHER CARDIAC SURGERY  01/2016    cardiac ablation    NEURO DEST FACET L/S W/IG SNGL  04/21/2015    Performed by Reza Tabor at SURGERY [facility]    LUMBAR FUSION ANTERIOR  08/21/2012    Performed by SUSIE SAWANT at SURGERY [facility]    ALYSSA BY LAPAROSCOPY  08/29/2010    Performed by SHAYY JOHNS at SURGERY [facility]    LAMINOTOMY      OTHER ABDOMINAL SURGERY      TONSILLECTOMY      tonsillectomy       Social History     Socioeconomic History    Marital status: Single     Spouse name: Not on file    Number of children: Not on file    Years of education: Not on file    Highest education level: Not on file   Occupational History    Not on file   Tobacco Use    Smoking status: Never    Smokeless tobacco: Never   Vaping Use    Vaping Use: Never used   Substance and Sexual Activity    Alcohol use: No     Alcohol/week: 0.0 oz    Drug use: Not Currently     Frequency: 7.0 times per week     Types: Marijuana, Oral    Sexual activity: Not Currently     Birth control/protection: Implant   Other Topics Concern    Not on file   Social History Narrative    ** Merged History Encounter **          Social Determinants of Health     Financial Resource Strain: Medium Risk (4/30/2021)    Overall Financial Resource Strain (CARDIA)     Difficulty of Paying Living  "Expenses: Somewhat hard   Food Insecurity: Food Insecurity Present (4/30/2021)    Hunger Vital Sign     Worried About Running Out of Food in the Last Year: Sometimes true     Ran Out of Food in the Last Year: Sometimes true   Transportation Needs: No Transportation Needs (2/13/2023)    PRAPARE - Transportation     Lack of Transportation (Medical): No     Lack of Transportation (Non-Medical): No   Physical Activity: Not on file   Stress: Not on file   Social Connections: Not on file   Intimate Partner Violence: Not on file   Housing Stability: Low Risk  (9/29/2020)    Housing Stability Vital Sign     Unable to Pay for Housing in the Last Year: No     Number of Places Lived in the Last Year: 1     Unstable Housing in the Last Year: No        Family History   Problem Relation Age of Onset    Hypertension Mother     Sleep Apnea Mother     Heart Disease Mother     Other Mother         hypothryod    Hypertension Maternal Uncle     Heart Disease Maternal Grandmother     Hypertension Maternal Grandmother     No Known Problems Sister     Other Sister         Narcolepsy;fibromyalsia;bone;nerve    Genitourinary () Problems Sister         endometriosis        Allergies   Allergen Reactions    Cefdinir Shortness of Breath and Itching     Tolerated 1/18/17  Tolerates ceftriaxone  Tolerated augmentin 8/2019     Depakote [Divalproex Sodium] Unspecified     Muscle spasms/muscle pain and weakness      Doxycycline Anaphylaxis and Vomiting     Other reaction(s): pustules/blisters  Other reaction(s): stomach pain    Montelukast [Singulair] Unspecified     Cardiac effusion    Vancomycin Itching     Pt becomes flushed in face and chest.   RXN=7/10/16    Wound Dressing Adhesive Rash     By pt report-\"removes skin totally off\"    Amitriptyline Unspecified     Headaches      Aripiprazole Unspecified    Aripiprazole [Abilify] Unspecified     Headaches/muscle twitching      Clindamycin Nausea         Other reaction(s): nausea stomach pain    " "Flomax [Tamsulosin Hydrochloride] Swelling    Levaquin Unspecified     Severe muscle cramps in legs  RXN=unknown  Other reaction(s): leg muscle cramps    Metformin Unspecified     Increased lactic acid     Other reaction(s): itching and rash/nausea vomiting    Tamsulosin Swelling     Swelling of legs    Tape Rash     Tears skin off  coban with Tegaderm tape ok intermittently  RXN=ongoing    Amoxicillin Rash    Depakote [Divalproex Sodium]     Erythromycin Rash     .  Other reaction(s): nausea stomach pain    Hydromorphone      Other reaction(s): vomiting    Montelukast     Ampicillin Rash     Pt reports that she received a rash     Ciprofloxacin Rash          Keflex Rash     Pt states she gets a rash with this medication  Tolerates ceftriaxone  Can take with Benadryl    Levofloxacin Unspecified     Leg muscle cramps    Metronidazole Rash     \"Vision problems\"  Other reaction(s): vision problems    Penicillins Hives     Can take with Benadryl    Sulfa Drugs Itching and Myalgia     Muscle pain and weakness  Other reaction(s): unknown    Valproic Acid Rash     .       Review of Systems   Constitutional:  Negative for fever.   Respiratory:  Positive for shortness of breath and wheezing.    Cardiovascular:  Negative for chest pain.   Gastrointestinal:  Positive for abdominal pain.   Genitourinary:  Positive for dysuria and urgency. Negative for flank pain and hematuria.   All other systems reviewed and are negative.       Objective:   /76   Pulse 74   Temp 35.9 °C (96.7 °F)   Resp (!) 22   Ht 1.626 m (5' 4\")   Wt 108 kg (238 lb)   LMP  (LMP Unknown)   SpO2 99%   BMI 40.85 kg/m²     Physical Exam  Vitals reviewed.   Constitutional:       General: She is not in acute distress.     Appearance: She is not toxic-appearing.   Cardiovascular:      Rate and Rhythm: Normal rate.   Pulmonary:      Effort: Pulmonary effort is normal. No bradypnea, accessory muscle usage, prolonged expiration, respiratory distress or " retractions.      Breath sounds: Normal breath sounds. No stridor. No decreased breath sounds, wheezing, rhonchi or rales.   Abdominal:      Palpations: Abdomen is soft.   Neurological:      Mental Status: She is alert and oriented to person, place, and time.   Psychiatric:         Behavior: Behavior normal.         Assessment/Plan:   1. Acute cystitis without hematuria  - POCT Urinalysis  - POCT Pregnancy  - URINE CULTURE(NEW); Future  - nitrofurantoin (MACROBID) 100 MG Cap; Take 1 Capsule by mouth 2 times a day for 5 days.  Dispense: 10 Capsule; Refill: 0    2. SOB (shortness of breath)  - D-DIMER; Future    Results for orders placed or performed in visit on 07/26/23   POCT Urinalysis   Result Value Ref Range    POC Color yellow Negative    POC Appearance clear Negative    POC Glucose negative Negative mg/dL    POC Bilirubin negative Negative mg/dL    POC Ketones trace Negative mg/dL    POC Specific Gravity 1.030 <1.005 - >1.030    POC Blood negative Negative    POC Urine PH 5.5 5.0 - 8.0    POC Protein negative Negative mg/dL    POC Urobiligen 0.2 Negative (0.2) mg/dL    POC Nitrites negative Negative    POC Leukocyte Esterase small Negative   POCT Pregnancy   Result Value Ref Range    POC Urine Pregnancy Test Negative     Internal Control Positive Positive     Internal Control Negative Negative      *Note: Due to a large number of results and/or encounters for the requested time period, some results have not been displayed. A complete set of results can be found in Results Review.     -Oral Hydration: Drink plenty of water.  -Take antibiotic as prescribed.  -Follow up with PCP.    Follow up urgently for new or persistent abdominal pain, flank pain, difficulty with urination, fevers, vomiting, weakness, tachycardia, or any other concerns. Emergently for abdominal swelling, increased or persistent shortness of breath, chest pain, persistently elevated heart rate, or dizziness.     -Stable vitals. Afebrile. No CVA  tenderness.  No wheezing noted on exam. Non-labored, clear respirations. Oxygen saturation is 99% on RA. Has a hx of asthma. Advised continued use of her inhaler PRN. Advised f/u on D-dimer, as patient is reporting shortness of breath post surgery. Given ED precautions.    Differential diagnosis, natural history, supportive care, and indications for immediate follow-up discussed.

## 2023-07-27 PROBLEM — R01.1 HEART MURMUR: Status: ACTIVE | Noted: 2022-10-10

## 2023-07-27 PROBLEM — J44.9 CHRONIC OBSTRUCTIVE LUNG DISEASE (HCC): Status: ACTIVE | Noted: 2020-04-20

## 2023-07-27 PROBLEM — G47.00 INSOMNIA: Status: ACTIVE | Noted: 2022-10-31

## 2023-07-27 PROBLEM — Q79.60 EHLERS-DANLOS SYNDROME: Status: ACTIVE | Noted: 2022-11-13

## 2023-07-27 PROBLEM — K40.90 RIGHT INGUINAL HERNIA: Status: ACTIVE | Noted: 2022-10-09

## 2023-07-27 PROBLEM — N20.0 KIDNEY STONE: Status: ACTIVE | Noted: 2022-10-31

## 2023-07-27 PROBLEM — G43.909 MIGRAINE: Status: ACTIVE | Noted: 2022-10-10

## 2023-07-27 ASSESSMENT — ENCOUNTER SYMPTOMS: FEVER: 0

## 2023-08-02 ENCOUNTER — HOSPITAL ENCOUNTER (OUTPATIENT)
Facility: MEDICAL CENTER | Age: 33
End: 2023-08-02
Attending: NEUROLOGICAL SURGERY | Admitting: NEUROLOGICAL SURGERY
Payer: MEDICARE

## 2023-08-02 ENCOUNTER — APPOINTMENT (OUTPATIENT)
Dept: RADIOLOGY | Facility: MEDICAL CENTER | Age: 33
End: 2023-08-02
Attending: NEUROLOGICAL SURGERY
Payer: MEDICARE

## 2023-08-02 VITALS
RESPIRATION RATE: 15 BRPM | HEIGHT: 64 IN | HEART RATE: 66 BPM | DIASTOLIC BLOOD PRESSURE: 66 MMHG | SYSTOLIC BLOOD PRESSURE: 144 MMHG | BODY MASS INDEX: 41.93 KG/M2 | WEIGHT: 245.59 LBS | OXYGEN SATURATION: 99 % | TEMPERATURE: 98.1 F

## 2023-08-02 DIAGNOSIS — M54.12 CERVICAL RADICULITIS: ICD-10-CM

## 2023-08-02 LAB
GLUCOSE BLD STRIP.AUTO-MCNC: 148 MG/DL (ref 65–99)
GLUCOSE BLD STRIP.AUTO-MCNC: 98 MG/DL (ref 65–99)
HCG UR QL: NEGATIVE
INR PPP: 1.07 (ref 0.87–1.13)
PROTHROMBIN TIME: 13.8 SEC (ref 12–14.6)

## 2023-08-02 PROCEDURE — 700117 HCHG RX CONTRAST REV CODE 255: Mod: UD | Performed by: NEUROLOGICAL SURGERY

## 2023-08-02 PROCEDURE — 160002 HCHG RECOVERY MINUTES (STAT)

## 2023-08-02 PROCEDURE — 72126 CT NECK SPINE W/DYE: CPT

## 2023-08-02 PROCEDURE — 82962 GLUCOSE BLOOD TEST: CPT | Mod: 91

## 2023-08-02 PROCEDURE — 160046 HCHG PACU - 1ST 60 MINS PHASE II

## 2023-08-02 PROCEDURE — 81025 URINE PREGNANCY TEST: CPT

## 2023-08-02 PROCEDURE — 85610 PROTHROMBIN TIME: CPT

## 2023-08-02 PROCEDURE — 36415 COLL VENOUS BLD VENIPUNCTURE: CPT

## 2023-08-02 PROCEDURE — 62284 INJECTION FOR MYELOGRAM: CPT

## 2023-08-02 PROCEDURE — 160047 HCHG PACU  - EA ADDL 30 MINS PHASE II

## 2023-08-02 RX ADMIN — IOHEXOL 17 ML: 180 INJECTION INTRAVENOUS at 16:15

## 2023-08-02 ASSESSMENT — PAIN DESCRIPTION - PAIN TYPE: TYPE: SURGICAL PAIN

## 2023-08-02 ASSESSMENT — FIBROSIS 4 INDEX: FIB4 SCORE: 1.98

## 2023-08-02 NOTE — DISCHARGE INSTRUCTIONS
HOME CARE INSTRUCTIONS    ACTIVITY: Rest and take it easy for the first 24 hours.  A responsible adult is recommended to remain with you during that time.  It is normal to feel sleepy.  We encourage you to not do anything that requires balance, judgment or coordination.    FOR 24 HOURS DO NOT:  Drive, operate machinery or run household appliances.  Drink beer or alcoholic beverages.  Make important decisions or sign legal documents.    SPECIAL INSTRUCTIONS:   Myelogram    A myelogram is an imaging test. This test checks for problems in the spinal cord and the places where nerves attach to the spinal cord (nerve roots).  A dye (contrast material) is put into your spine before the X-ray. This provides a clearer image for your doctor to see.  You may need this test if you have a spinal cord problem that cannot be diagnosed with other imaging tests. You may also have this test to check your spine after surgery.  Tell a doctor about:  Any allergies you have, especially to iodine.  All medicines you are taking, including vitamins, herbs, eye drops, creams, and over-the-counter medicines.  Any problems you or family members have had with anesthetic medicines or dye.  Any blood disorders you have.  Any surgeries you have had.  Any medical conditions you have or have had, including asthma.  Whether you are pregnant or may be pregnant.  What are the risks?  Generally, this is a safe procedure. However, problems may occur, including:  Infection.  Bleeding.  Allergic reaction to medicines or dyes.  Damage to your spinal cord or nerves.  Leaking of spinal fluid. This can cause a headache.  Damage to kidneys.  Seizures. This is rare.  What happens before the procedure?  Follow instructions from your doctor about what you cannot eat or drink. You may be asked to drink more fluids.  Ask your doctor about changing or stopping your normal medicines. This is important if you take diabetes medicines or blood thinners.  Plan to have  someone take you home from the hospital or clinic.  If you will be going home right after the procedure, plan to have someone with you for 24 hours.  What happens during the procedure?  You will lie face down on a table.  Your doctor will find the best injection site on your spine. This is most often in the lower back.  This area will be washed with soap.  You will be given a medicine to numb the area (local anesthetic).  Your doctor will place a long needle into the space around your spinal cord.  A sample of spinal fluid may be taken. This may be sent to the lab for testing.  The dye will be injected into the space around your spinal cord.  The exam table may be tilted. This helps the dye flow up or down your spine.  The X-ray will take images of your spinal cord.  A bandage (dressing) may be placed over the area where the dye was injected.  The procedure may vary among doctors and hospitals.  What can I expect after the procedure?  You may be monitored until you leave the hospital or clinic. This includes checking your blood pressure, heart rate, breathing rate, and blood oxygen level.  You may feel sore at the injection site. You may have a mild headache.  You will be told to lie flat with your head raised (elevated). This lowers the risk of a headache.  It is up to you to get the results of your procedure. Ask your doctor, or the department that is doing the procedure, when your results will be ready.  Follow these instructions at home:    Rest as told by your doctor. Lie flat with your head slightly elevated.  Do not bend, lift, or do hard work for 24-48 hours, or as told by your doctor.  Take over-the-counter and prescription medicines only as told by your doctor.  Take care of your bandage as told by your doctor.  Drink enough fluid to keep your pee (urine) pale yellow.  Bathe or shower as told by your doctor.  Contact a doctor if:  You have a fever.  You have a headache that lasts longer than 24 hours.  You  feel sick to your stomach (nauseous).  You vomit.  Your neck is stiff.  Your legs feel numb.  You cannot pee.  You cannot poop (no bowel movement).  You have a rash.  You are itchy or sneezing.  Get help right away if:  You have new symptoms or your symptoms get worse.  You have a seizure.  You have trouble breathing.  Summary  A myelogram is an imaging test that checks for problems in the spinal cord and the places where nerves attach to the spinal cord (nerve roots).  Before the procedure, follow instructions from your doctor. You will be told what not to eat or drink, or what medicines to change or stop.  After the procedure, you will be told to lie flat with your head raised (elevated). This will lower your risk of a headache.  Do not bend, lift, or do any hard work for 24-48 hours, or as told by your doctor.  Contact a doctor if you have a stiff neck or numb legs. Get help right away if your symptoms get worse, or you have a seizure or trouble breathing.  This information is not intended to replace advice given to you by your health care provider. Make sure you discuss any questions you have with your health care provider.  Document Revised: 04/11/2023 Document Reviewed: 02/27/2020  [employer] Patient Education © 2023 [employer]      DIET: To avoid nausea, slowly advance diet as tolerated, avoiding spicy or greasy foods for the first day.  Add more substantial food to your diet according to your physician's instructions.  Babies can be fed formula or breast milk as soon as they are hungry.  INCREASE FLUIDS AND FIBER TO AVOID CONSTIPATION.    SURGICAL DRESSING/BATHING: Dressing can be removed after 24 hours. OK to shower after dressing removal. No submerging in water for at least 1 week.     MEDICATIONS: Resume taking daily medication.  Take prescribed pain medication with food.  If no medication is prescribed, you may take non-aspirin pain medication if needed.  PAIN MEDICATION CAN BE VERY CONSTIPATING.  Take  a stool softener or laxative such as senokot, pericolace, or milk of magnesia if needed.      A follow-up appointment should be arranged with your doctor in 2-4 weeks; call to schedule.    You should CALL YOUR PHYSICIAN if you develop:  Fever greater than 101 degrees F.  Pain not relieved by medication, or persistent nausea or vomiting.  Excessive bleeding (blood soaking through dressing) or unexpected drainage from the wound.  Extreme redness or swelling around the incision site, drainage of pus or foul smelling drainage.  Inability to urinate or empty your bladder within 8 hours.  Problems with breathing or chest pain.    You should call 911 if you develop problems with breathing or chest pain.  If you are unable to contact your doctor or surgical center, you should go to the nearest emergency room or urgent care center.      MILD FLU-LIKE SYMPTOMS ARE NORMAL.  YOU MAY EXPERIENCE GENERALIZED MUSCLE ACHES, THROAT IRRITATION, HEADACHE AND/OR SOME NAUSEA.    If any questions arise, call your doctor.  If your doctor is not available, please feel free to call the Surgical Center at [phone].  The Center is open Monday through Friday from 7AM to 7PM.      A registered nurse may call you a few days after your surgery to see how you are doing after your procedure.    You may also receive a survey in the mail within the next two weeks and we ask that you take a few moments to complete the survey and return it to us.  Our goal is to provide you with very good care and we value your comments.     Depression / Suicide Risk    As you are discharged from this [facility] facility, it is important to learn how to keep safe from harming yourself.    Recognize the warning signs:  Abrupt changes in personality, positive or negative- including increase in energy   Giving away possessions  Change in eating patterns- significant weight changes-  positive or negative  Change in sleeping patterns- unable to sleep or sleeping all  the time   Unwillingness or inability to communicate  Depression  Unusual sadness, discouragement and loneliness  Talk of wanting to die  Neglect of personal appearance   Rebelliousness- reckless behavior  Withdrawal from people/activities they love  Confusion- inability to concentrate     If you or a loved one observes any of these behaviors or has concerns about self-harm, here's what you can do:  Talk about it- your feelings and reasons for harming yourself  Remove any means that you might use to hurt yourself (examples: pills, rope, extension cords, firearm)  Get professional help from the community (Mental Health, Substance Abuse, psychological counseling)  Do not be alone:Call your Safe Contact- someone whom you trust who will be there for you.  Call your local CRISIS HOTLINE 955-6908 or 097-564-7597  Call your local Children's Mobile Crisis Response Team [place] (835) 271-6916 or www.[employer]  Call the toll free National Suicide Prevention Hotlines   National Suicide Prevention Lifeline 924-180-LKJK (4030)  National Hope Line Network 800-SUICIDE (958-1140)    I acknowledge receipt and understanding of these Home Care instructions.

## 2023-08-02 NOTE — OR SURGEON
Immediate Post OP Note    PreOp Diagnosis: neuro symptoms, myelogram      PostOp Diagnosis: neuro symptoms, myelogram      Procedure(s):  fl-ct guided cervical myelogram-Dr Stringer      Anesthesiologist/Type of Anesthesia:  Local, 1% lidocaine      Specimens removed if any:  None    Estimated Blood Loss: None     Findings: CT images pending    Complications: None      8/2/2023 3:04 PM Sky Rivas M.D.

## 2023-08-03 ENCOUNTER — OFFICE VISIT (OUTPATIENT)
Dept: CARDIOLOGY | Facility: MEDICAL CENTER | Age: 33
End: 2023-08-03
Attending: INTERNAL MEDICINE
Payer: MEDICARE

## 2023-08-03 VITALS
OXYGEN SATURATION: 96 % | DIASTOLIC BLOOD PRESSURE: 64 MMHG | RESPIRATION RATE: 18 BRPM | BODY MASS INDEX: 41.66 KG/M2 | HEIGHT: 64 IN | WEIGHT: 244 LBS | SYSTOLIC BLOOD PRESSURE: 112 MMHG | HEART RATE: 65 BPM

## 2023-08-03 DIAGNOSIS — I47.11 INAPPROPRIATE SINUS TACHYCARDIA (HCC): ICD-10-CM

## 2023-08-03 DIAGNOSIS — G90.A POSTURAL ORTHOSTATIC TACHYCARDIA SYNDROME: ICD-10-CM

## 2023-08-03 PROCEDURE — 99214 OFFICE O/P EST MOD 30 MIN: CPT | Performed by: INTERNAL MEDICINE

## 2023-08-03 PROCEDURE — 99212 OFFICE O/P EST SF 10 MIN: CPT | Performed by: INTERNAL MEDICINE

## 2023-08-03 PROCEDURE — 99213 OFFICE O/P EST LOW 20 MIN: CPT | Performed by: INTERNAL MEDICINE

## 2023-08-03 PROCEDURE — 3074F SYST BP LT 130 MM HG: CPT | Performed by: INTERNAL MEDICINE

## 2023-08-03 PROCEDURE — 3078F DIAST BP <80 MM HG: CPT | Performed by: INTERNAL MEDICINE

## 2023-08-03 RX ORDER — METOPROLOL SUCCINATE 25 MG/1
25 TABLET, EXTENDED RELEASE ORAL DAILY
Qty: 90 TABLET | Refills: 3 | Status: SHIPPED | OUTPATIENT
Start: 2023-08-03

## 2023-08-03 ASSESSMENT — ENCOUNTER SYMPTOMS
MYALGIAS: 0
COUGH: 0
SHORTNESS OF BREATH: 0
LOSS OF CONSCIOUSNESS: 0
DIZZINESS: 0
PALPITATIONS: 0

## 2023-08-03 ASSESSMENT — FIBROSIS 4 INDEX: FIB4 SCORE: 1.98

## 2023-08-03 NOTE — OR NURSING
Arrived from PACU   Pt is A&O x4, VSS; denies N/V; states pain is at tolerable level. Dressing CDI to lower-mid back; no active bleeding noted.     D/c orders received. IV dc'd.   Pt changed into clothing with assistance.   Discharge reviewed  Pt and family verbalized understanding and questions answered.   Patient states ready to d/c home.   Pt dc'd in w/c with family.

## 2023-08-03 NOTE — PROGRESS NOTES
"2 RN skin check with Mir.    Redness in pannus area.  Moisture associated dermatitis in perineal area. Barrier cream applied.   Bruising and scabs on abdomen pt states \"from cat\".  Heels calloused, pink, and blanching.   All other skin intact.      " Detail Level: Detailed

## 2023-08-07 ENCOUNTER — HOSPITAL ENCOUNTER (OUTPATIENT)
Facility: MEDICAL CENTER | Age: 33
End: 2023-08-07
Attending: STUDENT IN AN ORGANIZED HEALTH CARE EDUCATION/TRAINING PROGRAM
Payer: MEDICARE

## 2023-08-07 PROCEDURE — 87077 CULTURE AEROBIC IDENTIFY: CPT

## 2023-08-07 PROCEDURE — 87086 URINE CULTURE/COLONY COUNT: CPT

## 2023-08-10 LAB
BACTERIA UR CULT: NORMAL
SIGNIFICANT IND 70042: NORMAL
SITE SITE: NORMAL
SOURCE SOURCE: NORMAL

## 2023-08-16 ENCOUNTER — HOSPITAL ENCOUNTER (EMERGENCY)
Facility: MEDICAL CENTER | Age: 33
DRG: 123 | End: 2023-08-16
Attending: STUDENT IN AN ORGANIZED HEALTH CARE EDUCATION/TRAINING PROGRAM
Payer: MEDICARE

## 2023-08-16 VITALS
RESPIRATION RATE: 19 BRPM | WEIGHT: 243.61 LBS | TEMPERATURE: 97.5 F | DIASTOLIC BLOOD PRESSURE: 62 MMHG | HEIGHT: 64 IN | BODY MASS INDEX: 41.59 KG/M2 | OXYGEN SATURATION: 91 % | HEART RATE: 66 BPM | SYSTOLIC BLOOD PRESSURE: 118 MMHG

## 2023-08-16 DIAGNOSIS — R51.9 ACUTE INTRACTABLE HEADACHE, UNSPECIFIED HEADACHE TYPE: ICD-10-CM

## 2023-08-16 DIAGNOSIS — S05.02XA ABRASION OF LEFT CORNEA, INITIAL ENCOUNTER: ICD-10-CM

## 2023-08-16 LAB
ALBUMIN SERPL BCP-MCNC: 4.5 G/DL (ref 3.2–4.9)
ALBUMIN/GLOB SERPL: 2.5 G/DL
ALP SERPL-CCNC: 68 U/L (ref 30–99)
ALT SERPL-CCNC: 23 U/L (ref 2–50)
ANION GAP SERPL CALC-SCNC: 14 MMOL/L (ref 7–16)
AST SERPL-CCNC: 15 U/L (ref 12–45)
BASOPHILS # BLD AUTO: 0.8 % (ref 0–1.8)
BASOPHILS # BLD: 0.05 K/UL (ref 0–0.12)
BILIRUB SERPL-MCNC: 0.2 MG/DL (ref 0.1–1.5)
BUN SERPL-MCNC: 16 MG/DL (ref 8–22)
CALCIUM ALBUM COR SERPL-MCNC: 8.5 MG/DL (ref 8.5–10.5)
CALCIUM SERPL-MCNC: 8.9 MG/DL (ref 8.5–10.5)
CHLORIDE SERPL-SCNC: 111 MMOL/L (ref 96–112)
CO2 SERPL-SCNC: 16 MMOL/L (ref 20–33)
CREAT SERPL-MCNC: 0.67 MG/DL (ref 0.5–1.4)
EOSINOPHIL # BLD AUTO: 0.47 K/UL (ref 0–0.51)
EOSINOPHIL NFR BLD: 7.8 % (ref 0–6.9)
ERYTHROCYTE [DISTWIDTH] IN BLOOD BY AUTOMATED COUNT: 39.7 FL (ref 35.9–50)
GFR SERPLBLD CREATININE-BSD FMLA CKD-EPI: 118 ML/MIN/1.73 M 2
GLOBULIN SER CALC-MCNC: 1.8 G/DL (ref 1.9–3.5)
GLUCOSE SERPL-MCNC: 91 MG/DL (ref 65–99)
HCT VFR BLD AUTO: 41.2 % (ref 37–47)
HGB BLD-MCNC: 14.3 G/DL (ref 12–16)
IMM GRANULOCYTES # BLD AUTO: 0.02 K/UL (ref 0–0.11)
IMM GRANULOCYTES NFR BLD AUTO: 0.3 % (ref 0–0.9)
LYMPHOCYTES # BLD AUTO: 2.03 K/UL (ref 1–4.8)
LYMPHOCYTES NFR BLD: 33.9 % (ref 22–41)
MCH RBC QN AUTO: 30.8 PG (ref 27–33)
MCHC RBC AUTO-ENTMCNC: 34.7 G/DL (ref 32.2–35.5)
MCV RBC AUTO: 88.6 FL (ref 81.4–97.8)
MONOCYTES # BLD AUTO: 0.49 K/UL (ref 0–0.85)
MONOCYTES NFR BLD AUTO: 8.2 % (ref 0–13.4)
NEUTROPHILS # BLD AUTO: 2.93 K/UL (ref 1.82–7.42)
NEUTROPHILS NFR BLD: 49 % (ref 44–72)
NRBC # BLD AUTO: 0 K/UL
NRBC BLD-RTO: 0 /100 WBC (ref 0–0.2)
PLATELET # BLD AUTO: 194 K/UL (ref 164–446)
PMV BLD AUTO: 11.6 FL (ref 9–12.9)
POTASSIUM SERPL-SCNC: 3.8 MMOL/L (ref 3.6–5.5)
PROT SERPL-MCNC: 6.3 G/DL (ref 6–8.2)
RBC # BLD AUTO: 4.65 M/UL (ref 4.2–5.4)
SODIUM SERPL-SCNC: 141 MMOL/L (ref 135–145)
WBC # BLD AUTO: 6 K/UL (ref 4.8–10.8)

## 2023-08-16 PROCEDURE — 700105 HCHG RX REV CODE 258: Mod: UD | Performed by: STUDENT IN AN ORGANIZED HEALTH CARE EDUCATION/TRAINING PROGRAM

## 2023-08-16 PROCEDURE — 700111 HCHG RX REV CODE 636 W/ 250 OVERRIDE (IP): Mod: UD | Performed by: STUDENT IN AN ORGANIZED HEALTH CARE EDUCATION/TRAINING PROGRAM

## 2023-08-16 PROCEDURE — 36415 COLL VENOUS BLD VENIPUNCTURE: CPT

## 2023-08-16 PROCEDURE — 80053 COMPREHEN METABOLIC PANEL: CPT

## 2023-08-16 PROCEDURE — 85025 COMPLETE CBC W/AUTO DIFF WBC: CPT

## 2023-08-16 PROCEDURE — 99284 EMERGENCY DEPT VISIT MOD MDM: CPT

## 2023-08-16 PROCEDURE — 96374 THER/PROPH/DIAG INJ IV PUSH: CPT

## 2023-08-16 PROCEDURE — 96375 TX/PRO/DX INJ NEW DRUG ADDON: CPT

## 2023-08-16 RX ORDER — ERYTHROMYCIN 5 MG/G
1 OINTMENT OPHTHALMIC 4 TIMES DAILY
Qty: 3.5 G | Refills: 0 | Status: ACTIVE | OUTPATIENT
Start: 2023-08-16 | End: 2023-08-23

## 2023-08-16 RX ORDER — PREDNISONE 20 MG/1
60 TABLET ORAL ONCE
Status: COMPLETED | OUTPATIENT
Start: 2023-08-16 | End: 2023-08-16

## 2023-08-16 RX ORDER — PROCHLORPERAZINE EDISYLATE 5 MG/ML
10 INJECTION INTRAMUSCULAR; INTRAVENOUS ONCE
Status: COMPLETED | OUTPATIENT
Start: 2023-08-16 | End: 2023-08-16

## 2023-08-16 RX ORDER — PREDNISONE 10 MG/1
60 TABLET ORAL DAILY
Qty: 30 TABLET | Refills: 0 | Status: ON HOLD | OUTPATIENT
Start: 2023-08-16 | End: 2023-08-21

## 2023-08-16 RX ORDER — SODIUM CHLORIDE 9 MG/ML
INJECTION, SOLUTION INTRAVENOUS ONCE
Status: COMPLETED | OUTPATIENT
Start: 2023-08-16 | End: 2023-08-16

## 2023-08-16 RX ORDER — TETRACAINE HYDROCHLORIDE 5 MG/ML
1 SOLUTION OPHTHALMIC ONCE
Status: DISCONTINUED | OUTPATIENT
Start: 2023-08-16 | End: 2023-08-16 | Stop reason: HOSPADM

## 2023-08-16 RX ORDER — DIPHENHYDRAMINE HYDROCHLORIDE 50 MG/ML
12.5 INJECTION INTRAMUSCULAR; INTRAVENOUS ONCE
Status: COMPLETED | OUTPATIENT
Start: 2023-08-16 | End: 2023-08-16

## 2023-08-16 RX ADMIN — PROCHLORPERAZINE EDISYLATE 10 MG: 5 INJECTION INTRAMUSCULAR; INTRAVENOUS at 16:52

## 2023-08-16 RX ADMIN — SODIUM CHLORIDE: 9 INJECTION, SOLUTION INTRAVENOUS at 16:52

## 2023-08-16 RX ADMIN — DIPHENHYDRAMINE HYDROCHLORIDE 12.5 MG: 50 INJECTION, SOLUTION INTRAMUSCULAR; INTRAVENOUS at 16:52

## 2023-08-16 RX ADMIN — PREDNISONE 60 MG: 20 TABLET ORAL at 18:31

## 2023-08-16 ASSESSMENT — FIBROSIS 4 INDEX: FIB4 SCORE: 1.98

## 2023-08-16 NOTE — ED TRIAGE NOTES
Chief Complaint   Patient presents with    Eye Pain     C/o pain in left eye since 7am. States its watery and painful. Denies trauma. +photophobia. Pt wearing sunglasses in triage. States has hx of optic neuritis.      NAD. Rates pain in left eye as 8/10  Vitals:    08/16/23 1422   BP: (!) 133/90   Pulse: 89   Resp: 18   Temp: 36.6 °C (97.9 °F)   SpO2: 97%     Educated on triage process. Instructed to notify staff for any worsening symptoms. Denies any recent travel. Denies exposure to known covid positive patients. Denies any respiratory symptoms.

## 2023-08-17 NOTE — ED PROVIDER NOTES
ED Provider Note    CHIEF COMPLAINT  Chief Complaint   Patient presents with    Eye Pain     C/o pain in left eye since 7am. States its watery and painful. Denies trauma. +photophobia. Pt wearing sunglasses in triage. States has hx of optic neuritis.        EXTERNAL RECORDS REVIEWED  Outpatient Notes office visit on 8/3/2023 for sinus tachycardia    HPI/ROS  LIMITATION TO HISTORY   Select: : None  OUTSIDE HISTORIAN(S):      Kristin Balderrama is a 32 y.o. female with past medical history of idiopathic intracranial hypertension, optic neuritis, migraine headaches who presents with left-sided eye pain since 7 AM with clear drainage and pain.  Patient denies trauma.  Patient Dors is photophobia.  Patient reports this feels similar to her optic neuritis.  Patient was seen by neuro-ophthalmologist for many years but the patient's doctor has retired and patient is unable to follow-up with neuro-ophthalmologist.  Patient reports typically she gets IV steroids and gets admitted when this occurs.  Patient denies unilateral weakness or numbness, slurred speech, facial droop.    PAST MEDICAL HISTORY   has a past medical history of Abdominal pain, Anginal syndrome, Apnea, sleep, Arrhythmia, Arthritis, ASTHMA, Back pain, Borderline personality disorder ([facility]), Breath shortness, Bronchitis (02/08/2022), Cardiac arrhythmia, Chickenpox, Chronic UTI (09/18/2010), Cough, Daytime sleepiness, Dental disorder (03/08/2021), Depression, Diabetes ([facility]), Diarrhea, Disorder of thyroid, Fall, Fatigue, Frequent headaches, Gasping for breath, Gynecological disorder, Headache(784.0), Heart burn, Heart murmur, History of falling, Indigestion, Migraine, 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    SURGICAL HISTORY   has a past surgical history that includes neuro dest facet l/s w/ig sngl (04/21/2015); recovery (01/27/2016); delmar by laparoscopy (08/29/2010); lumbar fusion anterior (08/21/2012); other cardiac surgery (01/2016); tonsillectomy; bowel stimulator insertion (07/13/2016); gastroscopy with balloon dilatation (N/A, 01/04/2017); muscle biopsy (Right, 01/26/2017); other abdominal surgery; laminotomy; bowel stimulator insertion (03/10/2021); lap,diagnostic abdomen (02/14/2022); ovarian cystectomy (Right, 02/14/2022); percut fix prox/neck femur fx (Left, 01/28/2023); inguinal hernia laparoscopic (Right, 07/21/2023); and hernia repair (Right, 07/21/2023).    FAMILY HISTORY  Family History   Problem Relation Age of Onset    Hypertension Mother     Sleep Apnea Mother     Heart Disease Mother     Other Mother         hypothryod    Hypertension Maternal Uncle     Heart Disease Maternal Grandmother     Hypertension Maternal Grandmother     No Known Problems Sister     Other Sister         Narcolepsy;fibromyalsia;bone;nerve    Genitourinary () Problems Sister         endometriosis       SOCIAL HISTORY  Social History     Tobacco Use    Smoking status: Never    Smokeless tobacco: Never   Vaping Use    Vaping Use: Never used   Substance and Sexual Activity    Alcohol use: No     Alcohol/week: 0.0 oz    Drug use: Not Currently     Frequency: 7.0 times per week     Types: Marijuana, Oral    Sexual activity: Not Currently     Birth control/protection: Implant       CURRENT MEDICATIONS  Home Medications    **Home medications have not yet been reviewed for this encounter**         ALLERGIES  Allergies   Allergen Reactions    Cefdinir Shortness of Breath and Itching     Tolerated 1/18/17  Tolerates  "ceftriaxone  Tolerated augmentin 8/2019     Depakote [Divalproex Sodium] Unspecified     Muscle spasms/muscle pain and weakness      Doxycycline Anaphylaxis and Vomiting     Other reaction(s): pustules/blisters  Other reaction(s): stomach pain    Montelukast [Singulair] Unspecified     Cardiac effusion    Vancomycin Itching     Pt becomes flushed in face and chest.   RXN=7/10/16    Wound Dressing Adhesive Rash     By pt report-\"removes skin totally off\"    Amitriptyline Unspecified     Headaches      Aripiprazole Unspecified    Aripiprazole [Abilify] Unspecified     Headaches/muscle twitching      Clindamycin Nausea         Other reaction(s): nausea stomach pain    Flomax [Tamsulosin Hydrochloride] Swelling    Levaquin Unspecified     Severe muscle cramps in legs  RXN=unknown  Other reaction(s): leg muscle cramps    Metformin Unspecified     Increased lactic acid     Other reaction(s): itching and rash/nausea vomiting    Tamsulosin Swelling     Swelling of legs    Tape Rash     Tears skin off  coban with Tegaderm tape ok intermittently  RXN=ongoing    Amoxicillin Rash    Depakote [Divalproex Sodium]     Erythromycin Rash     .  Other reaction(s): nausea stomach pain    Hydromorphone      Other reaction(s): vomiting    Montelukast     Ampicillin Rash     Pt reports that she received a rash     Ciprofloxacin Rash          Keflex Rash     Pt states she gets a rash with this medication  Tolerates ceftriaxone  Can take with Benadryl    Levofloxacin Unspecified     Leg muscle cramps    Metronidazole Rash     \"Vision problems\"  Other reaction(s): vision problems    Penicillins Hives     Can take with Benadryl    Sulfa Drugs Itching, Myalgia and Hives     Muscle pain and weakness  Other reaction(s): unknown    Valproic Acid Rash     .       PHYSICAL EXAM  VITAL SIGNS: /56   Pulse 65   Temp 36.6 °C (97.9 °F) (Temporal)   Resp 18   Ht 1.626 m (5' 4\")   Wt 110 kg (243 lb 9.7 oz)   LMP  (LMP Unknown) Comment: " "\"haven't had periods in over 3 years\"  SpO2 96%   BMI 41.82 kg/m²    Vitals and nursing note reviewed.   Constitutional:       Comments: Patient is lying in bed supine, pleasant, conversant, speaking in complete sentences, sunglasses on sitting in dark room  HENT:      Head: Normocephalic and atraumatic.   Eyes:      Extraocular Movements: Extraocular movements intact.      Conjunctiva/sclera: Conjunctivae normal.      Pupils: Pupils are equal, round, and reactive to light.    Visual acuity -   Left: 20/50  Right: 20/20  Both:20/20  Intraocular pressure right 8, left 16  Small corneal abrasion the anterior aspect of the left eye, no corneal ulcer, no Tran sign  Cardiovascular:      Pulses: Normal pulses.      Comments: HR 89  Pulmonary:      Effort: Pulmonary effort is normal. No respiratory distress.   Musculoskeletal:         General: No swelling. Normal range of motion.      Cervical back: Normal range of motion. No rigidity.   Skin:     General: Skin is warm and dry.      Capillary Refill: Capillary refill takes less than 2 seconds.   Neurological:      Mental Status: Alert.  No cranial nerve deficits, moving all extremities, sensation light touch grossly intact in the bilateral upper and lower extremities      DIAGNOSTIC STUDIES / PROCEDURES  EKG  I have independently interpreted this EKG      LABS  No leukocytosis mild bicarb depression        COURSE & MEDICAL DECISION MAKING      INITIAL ASSESSMENT, COURSE AND PLAN  Care Narrative: CBC to evaluate for acute anemia and leukocytosis.  CMP to evaluate for acute electrolyte abnormality, acute kidney injury, acute liver failure or dysfunction.  Migraine headache within the differential, optic neuritis, occipital headache.  We will patient will be treated with headache cocktail.  Fluorescein and tetracaine exam to evaluate for corneal abrasion, corneal ulcer, globe rupture.  Pressure exam to evaluate for acute angle-closure glaucoma.  Disposition pending " work-up.    Electronically signed by: Abihshek Frances M.D., 8/16/2023 5:23 PM    Mild depression in bicarb, likely dehydration, IV fluids have been given.  Patient with small corneal abrasion.  Patient allergic to all corneal abrasion antibiotics, patient given follow-up with ophthalmology Dr. Barnes for recheck in 1 to 2 days.  Patient has been offered admission but declined because she reports her eye pain and vision has improved significantly following headache cocktail.  She has been given a small course of prednisone and counseled to return should her symptoms worsen.    Repeat physical exam benign.  I doubt any serious emergency process at this time.  Patient and/or family, friends given strict return precautions for worsening symptoms and care instructions. They have demonstrated understanding of discharge instructions through teach back mechanism. Advised PCP follow-up in 1-2 days.  Patient/family/friend expresses understanding and agrees to plan.    This dictation has been created using voice recognition software. I am continuously working with the software to minimize the number of voice recognition errors and I have made every attempt to manually correct the errors within my dictation. However errors  related to this voice recognition software may still exist and should be interpreted within the appropriate context.     Electronically signed by: Abhishek Frances M.D., 8/16/2023 6:29 PM      HYDRATION: Based on the patient's presentation of Dehydration the patient was given IV fluids. IV Hydration was used because oral hydration was not adequate alone. Upon recheck following hydration, the patient was improved.    Escalation of care considered, and ultimately not performed:after discussion with the patient / family, they have elected to decline an escalation in care        FINAL DIAGNOSIS  1. Abrasion of left cornea, initial encounter    2. Acute intractable headache, unspecified headache type            Electronically signed by: Abhishek Frances M.D., 8/16/2023 5:20 PM

## 2023-08-17 NOTE — ED NOTES
Pt discharged home as ordered by erp. Pt instructed to follow up her PCP and opthalmology. Pt verbalized understanding and instructed on where to  RX

## 2023-08-18 ENCOUNTER — HOSPITAL ENCOUNTER (INPATIENT)
Facility: MEDICAL CENTER | Age: 33
LOS: 3 days | DRG: 123 | End: 2023-08-21
Attending: EMERGENCY MEDICINE | Admitting: INTERNAL MEDICINE
Payer: MEDICARE

## 2023-08-18 DIAGNOSIS — K21.9 GASTROESOPHAGEAL REFLUX DISEASE WITHOUT ESOPHAGITIS: Chronic | ICD-10-CM

## 2023-08-18 DIAGNOSIS — H46.9 OPTIC NEURITIS: ICD-10-CM

## 2023-08-18 PROBLEM — J45.909 UNCOMPLICATED ASTHMA: Status: ACTIVE | Noted: 2021-04-15

## 2023-08-18 LAB
ANION GAP SERPL CALC-SCNC: 12 MMOL/L (ref 7–16)
BASOPHILS # BLD AUTO: 0.3 % (ref 0–1.8)
BASOPHILS # BLD: 0.02 K/UL (ref 0–0.12)
BUN SERPL-MCNC: 17 MG/DL (ref 8–22)
CALCIUM SERPL-MCNC: 9.2 MG/DL (ref 8.5–10.5)
CHLORIDE SERPL-SCNC: 110 MMOL/L (ref 96–112)
CO2 SERPL-SCNC: 17 MMOL/L (ref 20–33)
CREAT SERPL-MCNC: 0.79 MG/DL (ref 0.5–1.4)
EOSINOPHIL # BLD AUTO: 0 K/UL (ref 0–0.51)
EOSINOPHIL NFR BLD: 0 % (ref 0–6.9)
ERYTHROCYTE [DISTWIDTH] IN BLOOD BY AUTOMATED COUNT: 40.5 FL (ref 35.9–50)
GFR SERPLBLD CREATININE-BSD FMLA CKD-EPI: 101 ML/MIN/1.73 M 2
GLUCOSE SERPL-MCNC: 122 MG/DL (ref 65–99)
HCT VFR BLD AUTO: 40.6 % (ref 37–47)
HGB BLD-MCNC: 14.1 G/DL (ref 12–16)
IMM GRANULOCYTES # BLD AUTO: 0.06 K/UL (ref 0–0.11)
IMM GRANULOCYTES NFR BLD AUTO: 0.9 % (ref 0–0.9)
LYMPHOCYTES # BLD AUTO: 0.97 K/UL (ref 1–4.8)
LYMPHOCYTES NFR BLD: 14.6 % (ref 22–41)
MCH RBC QN AUTO: 31.4 PG (ref 27–33)
MCHC RBC AUTO-ENTMCNC: 34.7 G/DL (ref 32.2–35.5)
MCV RBC AUTO: 90.4 FL (ref 81.4–97.8)
MONOCYTES # BLD AUTO: 0.22 K/UL (ref 0–0.85)
MONOCYTES NFR BLD AUTO: 3.3 % (ref 0–13.4)
NEUTROPHILS # BLD AUTO: 5.36 K/UL (ref 1.82–7.42)
NEUTROPHILS NFR BLD: 80.9 % (ref 44–72)
NRBC # BLD AUTO: 0 K/UL
NRBC BLD-RTO: 0 /100 WBC (ref 0–0.2)
PLATELET # BLD AUTO: 170 K/UL (ref 164–446)
PMV BLD AUTO: 11.4 FL (ref 9–12.9)
POTASSIUM SERPL-SCNC: 4.5 MMOL/L (ref 3.6–5.5)
RBC # BLD AUTO: 4.49 M/UL (ref 4.2–5.4)
SODIUM SERPL-SCNC: 139 MMOL/L (ref 135–145)
WBC # BLD AUTO: 6.6 K/UL (ref 4.8–10.8)

## 2023-08-18 PROCEDURE — 99223 1ST HOSP IP/OBS HIGH 75: CPT | Mod: AI | Performed by: INTERNAL MEDICINE

## 2023-08-18 PROCEDURE — 700105 HCHG RX REV CODE 258: Performed by: EMERGENCY MEDICINE

## 2023-08-18 PROCEDURE — 700111 HCHG RX REV CODE 636 W/ 250 OVERRIDE (IP): Mod: JZ | Performed by: EMERGENCY MEDICINE

## 2023-08-18 PROCEDURE — 700102 HCHG RX REV CODE 250 W/ 637 OVERRIDE(OP): Mod: UD | Performed by: EMERGENCY MEDICINE

## 2023-08-18 PROCEDURE — A9270 NON-COVERED ITEM OR SERVICE: HCPCS | Mod: UD | Performed by: EMERGENCY MEDICINE

## 2023-08-18 PROCEDURE — 99285 EMERGENCY DEPT VISIT HI MDM: CPT

## 2023-08-18 PROCEDURE — 36415 COLL VENOUS BLD VENIPUNCTURE: CPT

## 2023-08-18 PROCEDURE — 700111 HCHG RX REV CODE 636 W/ 250 OVERRIDE (IP): Mod: JZ | Performed by: INTERNAL MEDICINE

## 2023-08-18 PROCEDURE — 85025 COMPLETE CBC W/AUTO DIFF WBC: CPT

## 2023-08-18 PROCEDURE — 770006 HCHG ROOM/CARE - MED/SURG/GYN SEMI*

## 2023-08-18 PROCEDURE — 700102 HCHG RX REV CODE 250 W/ 637 OVERRIDE(OP): Performed by: INTERNAL MEDICINE

## 2023-08-18 PROCEDURE — 90935 HEMODIALYSIS ONE EVALUATION: CPT

## 2023-08-18 PROCEDURE — A9270 NON-COVERED ITEM OR SERVICE: HCPCS | Performed by: INTERNAL MEDICINE

## 2023-08-18 PROCEDURE — 700101 HCHG RX REV CODE 250: Mod: UD | Performed by: EMERGENCY MEDICINE

## 2023-08-18 PROCEDURE — 80048 BASIC METABOLIC PNL TOTAL CA: CPT

## 2023-08-18 PROCEDURE — 96365 THER/PROPH/DIAG IV INF INIT: CPT

## 2023-08-18 PROCEDURE — 86051 AQUAPORIN-4 ANTB ELISA: CPT

## 2023-08-18 PROCEDURE — 86362 MOG-IGG1 ANTB CBA EACH: CPT

## 2023-08-18 RX ORDER — POLYETHYLENE GLYCOL 3350 17 G/17G
1 POWDER, FOR SOLUTION ORAL
Status: DISCONTINUED | OUTPATIENT
Start: 2023-08-18 | End: 2023-08-21 | Stop reason: HOSPADM

## 2023-08-18 RX ORDER — BISACODYL 10 MG
10 SUPPOSITORY, RECTAL RECTAL
Status: DISCONTINUED | OUTPATIENT
Start: 2023-08-18 | End: 2023-08-21 | Stop reason: HOSPADM

## 2023-08-18 RX ORDER — TOPIRAMATE 25 MG/1
25 TABLET ORAL EVERY MORNING
Status: DISCONTINUED | OUTPATIENT
Start: 2023-08-19 | End: 2023-08-21 | Stop reason: HOSPADM

## 2023-08-18 RX ORDER — ACETAMINOPHEN 325 MG/1
650 TABLET ORAL EVERY 6 HOURS PRN
Status: DISCONTINUED | OUTPATIENT
Start: 2023-08-18 | End: 2023-08-21 | Stop reason: HOSPADM

## 2023-08-18 RX ORDER — AMOXICILLIN 250 MG
2 CAPSULE ORAL 2 TIMES DAILY
Status: DISCONTINUED | OUTPATIENT
Start: 2023-08-18 | End: 2023-08-21 | Stop reason: HOSPADM

## 2023-08-18 RX ORDER — METOPROLOL SUCCINATE 25 MG/1
25 TABLET, EXTENDED RELEASE ORAL DAILY
Status: DISCONTINUED | OUTPATIENT
Start: 2023-08-19 | End: 2023-08-21 | Stop reason: HOSPADM

## 2023-08-18 RX ORDER — PROPARACAINE HYDROCHLORIDE 5 MG/ML
1 SOLUTION/ DROPS OPHTHALMIC ONCE
Status: COMPLETED | OUTPATIENT
Start: 2023-08-18 | End: 2023-08-18

## 2023-08-18 RX ORDER — ZIPRASIDONE HYDROCHLORIDE 40 MG/1
40 CAPSULE ORAL
Status: DISCONTINUED | OUTPATIENT
Start: 2023-08-18 | End: 2023-08-21 | Stop reason: HOSPADM

## 2023-08-18 RX ORDER — TOPIRAMATE 50 MG/1
50 TABLET, FILM COATED ORAL 2 TIMES DAILY
COMMUNITY
Start: 2023-08-02

## 2023-08-18 RX ORDER — TOPIRAMATE 25 MG/1
50 TABLET ORAL
Status: DISCONTINUED | OUTPATIENT
Start: 2023-08-18 | End: 2023-08-21 | Stop reason: HOSPADM

## 2023-08-18 RX ORDER — TOPIRAMATE 25 MG/1
25-50 TABLET ORAL 2 TIMES DAILY
Status: DISCONTINUED | OUTPATIENT
Start: 2023-08-18 | End: 2023-08-18

## 2023-08-18 RX ORDER — ONDANSETRON 4 MG/1
4 TABLET, ORALLY DISINTEGRATING ORAL EVERY 4 HOURS PRN
Status: DISCONTINUED | OUTPATIENT
Start: 2023-08-18 | End: 2023-08-21 | Stop reason: HOSPADM

## 2023-08-18 RX ORDER — DIPHENHYDRAMINE HCL 25 MG
50 TABLET ORAL
Status: DISCONTINUED | OUTPATIENT
Start: 2023-08-18 | End: 2023-08-21 | Stop reason: HOSPADM

## 2023-08-18 RX ORDER — MORPHINE SULFATE 4 MG/ML
2 INJECTION INTRAVENOUS EVERY 6 HOURS PRN
Status: DISCONTINUED | OUTPATIENT
Start: 2023-08-18 | End: 2023-08-21 | Stop reason: HOSPADM

## 2023-08-18 RX ORDER — PROMETHAZINE HYDROCHLORIDE 25 MG/1
12.5-25 SUPPOSITORY RECTAL EVERY 4 HOURS PRN
Status: DISCONTINUED | OUTPATIENT
Start: 2023-08-18 | End: 2023-08-21 | Stop reason: HOSPADM

## 2023-08-18 RX ORDER — PROMETHAZINE HYDROCHLORIDE 25 MG/1
12.5-25 TABLET ORAL EVERY 4 HOURS PRN
Status: DISCONTINUED | OUTPATIENT
Start: 2023-08-18 | End: 2023-08-21 | Stop reason: HOSPADM

## 2023-08-18 RX ORDER — PROCHLORPERAZINE EDISYLATE 5 MG/ML
5-10 INJECTION INTRAMUSCULAR; INTRAVENOUS EVERY 4 HOURS PRN
Status: DISCONTINUED | OUTPATIENT
Start: 2023-08-18 | End: 2023-08-21 | Stop reason: HOSPADM

## 2023-08-18 RX ORDER — ACETAMINOPHEN 500 MG
1000 TABLET ORAL ONCE
Status: COMPLETED | OUTPATIENT
Start: 2023-08-18 | End: 2023-08-18

## 2023-08-18 RX ORDER — TIZANIDINE 4 MG/1
4 TABLET ORAL 3 TIMES DAILY
Status: DISCONTINUED | OUTPATIENT
Start: 2023-08-18 | End: 2023-08-21 | Stop reason: HOSPADM

## 2023-08-18 RX ORDER — SODIUM CHLORIDE 1 G/1
1 TABLET ORAL
Status: DISCONTINUED | OUTPATIENT
Start: 2023-08-18 | End: 2023-08-21 | Stop reason: HOSPADM

## 2023-08-18 RX ORDER — NAPROXEN 500 MG/1
500 TABLET ORAL 2 TIMES DAILY WITH MEALS
Status: DISCONTINUED | OUTPATIENT
Start: 2023-08-18 | End: 2023-08-21 | Stop reason: HOSPADM

## 2023-08-18 RX ORDER — CIPROFLOXACIN 500 MG/1
500 TABLET, FILM COATED ORAL 2 TIMES DAILY
Status: SHIPPED | COMMUNITY
Start: 2023-07-27 | End: 2023-08-18

## 2023-08-18 RX ORDER — CHOLECALCIFEROL (VITAMIN D3) 125 MCG
10 CAPSULE ORAL
Status: DISCONTINUED | OUTPATIENT
Start: 2023-08-18 | End: 2023-08-21 | Stop reason: HOSPADM

## 2023-08-18 RX ORDER — FLUCONAZOLE 150 MG/1
150 TABLET ORAL
Status: ON HOLD | COMMUNITY
Start: 2023-06-27 | End: 2024-02-22

## 2023-08-18 RX ORDER — LABETALOL HYDROCHLORIDE 5 MG/ML
10 INJECTION, SOLUTION INTRAVENOUS EVERY 4 HOURS PRN
Status: DISCONTINUED | OUTPATIENT
Start: 2023-08-18 | End: 2023-08-21 | Stop reason: HOSPADM

## 2023-08-18 RX ORDER — ONDANSETRON 2 MG/ML
4 INJECTION INTRAMUSCULAR; INTRAVENOUS EVERY 4 HOURS PRN
Status: DISCONTINUED | OUTPATIENT
Start: 2023-08-18 | End: 2023-08-21 | Stop reason: HOSPADM

## 2023-08-18 RX ADMIN — TIZANIDINE 4 MG: 4 TABLET ORAL at 20:33

## 2023-08-18 RX ADMIN — SENNOSIDES AND DOCUSATE SODIUM 2 TABLET: 50; 8.6 TABLET ORAL at 18:16

## 2023-08-18 RX ADMIN — Medication 10 MG: at 20:33

## 2023-08-18 RX ADMIN — SODIUM CHLORIDE 1 G: 1 TABLET ORAL at 18:16

## 2023-08-18 RX ADMIN — DIPHENHYDRAMINE HYDROCHLORIDE 50 MG: 25 TABLET ORAL at 20:33

## 2023-08-18 RX ADMIN — TOPIRAMATE 50 MG: 25 TABLET, FILM COATED ORAL at 20:33

## 2023-08-18 RX ADMIN — ACETAMINOPHEN 1000 MG: 500 TABLET, FILM COATED ORAL at 16:22

## 2023-08-18 RX ADMIN — ZIPRASIDONE HYDROCHLORIDE 40 MG: 40 CAPSULE ORAL at 20:33

## 2023-08-18 RX ADMIN — FLUORESCEIN SODIUM 1 MG: 1 STRIP OPHTHALMIC at 14:00

## 2023-08-18 RX ADMIN — SODIUM CHLORIDE 1000 MG: 9 INJECTION, SOLUTION INTRAVENOUS at 16:29

## 2023-08-18 RX ADMIN — MORPHINE SULFATE 2 MG: 4 INJECTION, SOLUTION INTRAMUSCULAR; INTRAVENOUS at 18:18

## 2023-08-18 RX ADMIN — IVABRADINE 7.5 MG: 7.5 TABLET, FILM COATED ORAL at 18:17

## 2023-08-18 RX ADMIN — PROPARACAINE HYDROCHLORIDE 1 DROP: 5 SOLUTION/ DROPS OPHTHALMIC at 14:00

## 2023-08-18 RX ADMIN — NAPROXEN 500 MG: 500 TABLET ORAL at 18:47

## 2023-08-18 ASSESSMENT — COGNITIVE AND FUNCTIONAL STATUS - GENERAL
SUGGESTED CMS G CODE MODIFIER DAILY ACTIVITY: CH
MOBILITY SCORE: 24
DAILY ACTIVITIY SCORE: 24
SUGGESTED CMS G CODE MODIFIER MOBILITY: CH

## 2023-08-18 ASSESSMENT — LIFESTYLE VARIABLES
TOTAL SCORE: 0
TOTAL SCORE: 0
EVER FELT BAD OR GUILTY ABOUT YOUR DRINKING: NO
EVER HAD A DRINK FIRST THING IN THE MORNING TO STEADY YOUR NERVES TO GET RID OF A HANGOVER: NO
ALCOHOL_USE: NO
HAVE PEOPLE ANNOYED YOU BY CRITICIZING YOUR DRINKING: NO
DO YOU DRINK ALCOHOL: NO
TOTAL SCORE: 0
REASON UNABLE TO ASSESS: U
HAVE YOU EVER FELT YOU SHOULD CUT DOWN ON YOUR DRINKING: NO
AVERAGE NUMBER OF DAYS PER WEEK YOU HAVE A DRINK CONTAINING ALCOHOL: 0
ON A TYPICAL DAY WHEN YOU DRINK ALCOHOL HOW MANY DRINKS DO YOU HAVE: 0
HOW MANY TIMES IN THE PAST YEAR HAVE YOU HAD 5 OR MORE DRINKS IN A DAY: 0
CONSUMPTION TOTAL: NEGATIVE

## 2023-08-18 ASSESSMENT — ENCOUNTER SYMPTOMS
FEVER: 0
NERVOUS/ANXIOUS: 1
MYALGIAS: 0
ABDOMINAL PAIN: 0
CHILLS: 0
EYE PAIN: 1
NAUSEA: 0
DEPRESSION: 0
VOMITING: 0
BLURRED VISION: 1
DIZZINESS: 0
SHORTNESS OF BREATH: 0
HEADACHES: 0

## 2023-08-18 ASSESSMENT — FIBROSIS 4 INDEX
FIB4 SCORE: 0.53
FIB4 SCORE: 0.61

## 2023-08-18 ASSESSMENT — PAIN DESCRIPTION - PAIN TYPE
TYPE: ACUTE PAIN

## 2023-08-18 NOTE — ED TRIAGE NOTES
Chief Complaint   Patient presents with    Sent by MD     Patient was seen by [facility] for worst pain and left eye getting worst. Patient was seen 8/16  and said was dx  with Optic neuritis Patient told to come to ER for IV steroids per [facility].

## 2023-08-18 NOTE — ED NOTES
Pt ambulatory to room. Agree with triage note. Changed in to gown, connected to monitor. Chart up for ERP.

## 2023-08-18 NOTE — ED PROVIDER NOTES
ED Provider Note    CHIEF COMPLAINT  Chief Complaint   Patient presents with    Sent by MD     Patient was seen by jose surgery for worst pain and left eye getting worst. Patient was seen 8/16  and said was dx  with Optic neuritis Patient told to come to ER for IV steroids per jose surgery.        EXTERNAL RECORDS REVIEWED  Outpatient Notes -patient seen in clinic for sinus tachycardia on 8/3/2023.  Patient was then seen in this emergency department 2 days ago for left eye pain.  She has a noted history of idiopathic intracranial hypertension, optic neuritis, and migraines.  She has been unable to follow-up with the neuro-ophthalmologist in the area because he retired and she has been unable to reestablish.  Her left eye visual acuity was 20/50 and right eye was 20/20 at that time.  Intraocular pressures were 8 on the right and 16 on the left.  There was a small corneal abrasion in the anterior aspect of the left eye without corneal ulcer or Tran sign.  Patient was offered admission but declined and was instructed to follow-up with Dr. Barnes/ophthalmology in 1 to 2 days.    HPI/ROS  LIMITATION TO HISTORY   Select: : None  OUTSIDE HISTORIAN(S):  None    Kristin Balderrama is a 32 y.o. female who presents with a chief complaint of worsening left eye pain.  She was seen in this ER several days ago for the same and admits that she declined admission at that time.  She was seen by her neurosurgery PA today for a herniated disk and told him about her symptoms which have been worsening and he sent her to the ER due to concern for optic neuritis.    Patient was initially diagnosed with optic neuritis by Dr. Ramirez, neuroophalmology. He has since retired and she has had difficulty establishing with another provider. She has a history of IIH and is just getting established with neurology to help manage that as well. She has been admitted to this facility in the past for optic neuritis and has received 1g solumedrol  daily for three days with improvement in her symptoms. She is currently on Humira, prescribed by her dermatologist, for hidradenitis suppurativa. She thinks this medication has led to the current episode of what she thinks is optic neuritis.    PAST MEDICAL HISTORY   has a past medical history of Abdominal pain, Anginal syndrome, Apnea, sleep, Arrhythmia, Arthritis, ASTHMA, Back pain, Borderline personality disorder ([facility]), Breath shortness, Bronchitis (02/08/2022), Cardiac arrhythmia, Chickenpox, Chronic UTI (09/18/2010), Cough, Daytime sleepiness, Dental disorder (03/08/2021), Depression, Diabetes ([facility]), Diarrhea, Disorder of thyroid, Fall, Fatigue, Frequent headaches, Gasping for breath, Gynecological disorder, Headache(784.0), Heart burn, Heart murmur, History of falling, Indigestion, Migraine, 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    SURGICAL HISTORY   has a past surgical history that includes neuro dest facet l/s w/ig sngl (04/21/2015); recovery (01/27/2016); delmar by laparoscopy (08/29/2010); lumbar fusion anterior (08/21/2012); other cardiac surgery (01/2016); tonsillectomy; bowel stimulator insertion (07/13/2016); gastroscopy with balloon dilatation (N/A, 01/04/2017); muscle biopsy (Right, 01/26/2017); other abdominal surgery; laminotomy; bowel stimulator insertion (03/10/2021); lap,diagnostic abdomen (02/14/2022); ovarian cystectomy (Right, 02/14/2022); percut fix prox/neck femur fx (Left, 01/28/2023); inguinal hernia laparoscopic (Right,  07/21/2023); and hernia repair (Right, 07/21/2023).    FAMILY HISTORY  Family History   Problem Relation Age of Onset    Hypertension Mother     Sleep Apnea Mother     Heart Disease Mother     Other Mother         hypothryod    Hypertension Maternal Uncle     Heart Disease Maternal Grandmother     Hypertension Maternal Grandmother     No Known Problems Sister     Other Sister         Narcolepsy;fibromyalsia;bone;nerve    Genitourinary () Problems Sister         endometriosis       SOCIAL HISTORY  Social History     Tobacco Use    Smoking status: Never    Smokeless tobacco: Never   Vaping Use    Vaping Use: Never used   Substance and Sexual Activity    Alcohol use: No     Alcohol/week: 0.0 oz    Drug use: Not Currently     Frequency: 7.0 times per week     Types: Marijuana, Oral    Sexual activity: Not Currently     Birth control/protection: Implant       CURRENT MEDICATIONS  Home Medications       Reviewed by Ruth Ann Espinal R.N. (Registered Nurse) on 08/18/23 at 1238  Med List Status: Partial     Medication Last Dose Status   Adalimumab 80 MG/0.8ML Pen-injector Kit  Active   albuterol (PROVENTIL) 2.5mg/0.5ml Nebu Soln  Active   albuterol 108 (90 Base) MCG/ACT Aero Soln inhalation aerosol  Active   dilTIAZem (CARDIZEM) 60 MG Tab  Active   diphenhydrAMINE (BENADRYL) 50 MG tablet  Active   docusate sodium (STOOL SOFTENER) 250 MG capsule  Active   erythromycin 5 MG/GM Ointment  Active   etonogestrel (NEXPLANON) 68 MG Implant implant  Active   ivabradine (CORLANOR) 7.5 MG Tab tablet  Active   Melatonin 10 MG Tab  Active   metoprolol SR (TOPROL XL) 25 MG TABLET SR 24 HR  Active   naproxen (NAPROSYN) 500 MG Tab  Active   predniSONE (DELTASONE) 10 MG Tab  Active   sodium chloride (SALT) 1 GM Tab  Active   tizanidine (ZANAFLEX) 4 MG Tab  Active   ziprasidone (GEODON) 40 MG Cap  Active                    ALLERGIES  Allergies   Allergen Reactions    Cefdinir Shortness of Breath and Itching     Tolerated 1/18/17  Tolerates  "ceftriaxone  Tolerated augmentin 8/2019     Depakote [Divalproex Sodium] Unspecified     Muscle spasms/muscle pain and weakness      Doxycycline Anaphylaxis and Vomiting     Other reaction(s): pustules/blisters  Other reaction(s): stomach pain    Montelukast [Singulair] Unspecified     Cardiac effusion    Vancomycin Itching     Pt becomes flushed in face and chest.   RXN=7/10/16    Wound Dressing Adhesive Rash     By pt report-\"removes skin totally off\"    Amitriptyline Unspecified     Headaches      Aripiprazole Unspecified    Aripiprazole [Abilify] Unspecified     Headaches/muscle twitching      Clindamycin Nausea         Other reaction(s): nausea stomach pain    Flomax [Tamsulosin Hydrochloride] Swelling    Levaquin Unspecified     Severe muscle cramps in legs  RXN=unknown  Other reaction(s): leg muscle cramps    Metformin Unspecified     Increased lactic acid     Other reaction(s): itching and rash/nausea vomiting    Tamsulosin Swelling     Swelling of legs    Tape Rash     Tears skin off  coban with Tegaderm tape ok intermittently  RXN=ongoing    Amoxicillin Rash    Depakote [Divalproex Sodium]     Erythromycin Rash     .  Other reaction(s): nausea stomach pain    Hydromorphone      Other reaction(s): vomiting    Montelukast     Ampicillin Rash     Pt reports that she received a rash     Ciprofloxacin Rash          Keflex Rash     Pt states she gets a rash with this medication  Tolerates ceftriaxone  Can take with Benadryl    Levofloxacin Unspecified     Leg muscle cramps    Metronidazole Rash     \"Vision problems\"  Other reaction(s): vision problems    Penicillins Hives     Can take with Benadryl    Sulfa Drugs Itching, Myalgia and Hives     Muscle pain and weakness  Other reaction(s): unknown    Valproic Acid Rash     .       PHYSICAL EXAM  VITAL SIGNS: BP (!) 168/103   Pulse 75   Temp 35.8 °C (96.5 °F) (Temporal)   Resp 17   Ht 1.626 m (5' 4\")   Wt 112 kg (247 lb 12.8 oz)   LMP  (LMP Unknown) Comment: " "\"haven't had periods in over 3 years\"  SpO2 96%   BMI 42.53 kg/m²    Physical Exam  Vitals and nursing note reviewed.   Constitutional:       Appearance: Normal appearance.   HENT:      Head: Normocephalic and atraumatic.      Right Ear: External ear normal.      Left Ear: External ear normal.      Nose: Nose normal.      Mouth/Throat:      Mouth: Mucous membranes are moist.      Pharynx: Oropharynx is clear.   Eyes:      General:         Right eye: No discharge.         Left eye: Discharge present.     Extraocular Movements: Extraocular movements intact.      Conjunctiva/sclera: Conjunctivae normal.      Pupils: Pupils are equal, round, and reactive to light.      Comments: Clear watery discharge from the left eye. Decreased ROM of EOM due to pain.  Vision OD: 20/70  OS: 20/70  Both: 20/50  Pressure left eye: 20  Pressure right eye: 14   Cardiovascular:      Rate and Rhythm: Normal rate and regular rhythm.   Pulmonary:      Effort: Pulmonary effort is normal.   Musculoskeletal:         General: Normal range of motion.      Cervical back: Normal range of motion and neck supple.   Skin:     General: Skin is warm and dry.   Neurological:      General: No focal deficit present.      Mental Status: She is alert and oriented to person, place, and time.   Psychiatric:         Mood and Affect: Mood normal.         Behavior: Behavior normal.       DIAGNOSTIC STUDIES / PROCEDURES  LABS  Results for orders placed or performed during the hospital encounter of 08/18/23   CBC WITH DIFFERENTIAL   Result Value Ref Range    WBC 6.6 4.8 - 10.8 K/uL    RBC 4.49 4.20 - 5.40 M/uL    Hemoglobin 14.1 12.0 - 16.0 g/dL    Hematocrit 40.6 37.0 - 47.0 %    MCV 90.4 81.4 - 97.8 fL    MCH 31.4 27.0 - 33.0 pg    MCHC 34.7 32.2 - 35.5 g/dL    RDW 40.5 35.9 - 50.0 fL    Platelet Count 170 164 - 446 K/uL    MPV 11.4 9.0 - 12.9 fL    Neutrophils-Polys 80.90 (H) 44.00 - 72.00 %    Lymphocytes 14.60 (L) 22.00 - 41.00 %    Monocytes 3.30 0.00 - " 13.40 %    Eosinophils 0.00 0.00 - 6.90 %    Basophils 0.30 0.00 - 1.80 %    Immature Granulocytes 0.90 0.00 - 0.90 %    Nucleated RBC 0.00 0.00 - 0.20 /100 WBC    Neutrophils (Absolute) 5.36 1.82 - 7.42 K/uL    Lymphs (Absolute) 0.97 (L) 1.00 - 4.80 K/uL    Monos (Absolute) 0.22 0.00 - 0.85 K/uL    Eos (Absolute) 0.00 0.00 - 0.51 K/uL    Baso (Absolute) 0.02 0.00 - 0.12 K/uL    Immature Granulocytes (abs) 0.06 0.00 - 0.11 K/uL    NRBC (Absolute) 0.00 K/uL   Basic Metabolic Panel   Result Value Ref Range    Sodium 139 135 - 145 mmol/L    Potassium 4.5 3.6 - 5.5 mmol/L    Chloride 110 96 - 112 mmol/L    Co2 17 (L) 20 - 33 mmol/L    Glucose 122 (H) 65 - 99 mg/dL    Bun 17 8 - 22 mg/dL    Creatinine 0.79 0.50 - 1.40 mg/dL    Calcium 9.2 8.5 - 10.5 mg/dL    Anion Gap 12.0 7.0 - 16.0   ESTIMATED GFR   Result Value Ref Range    GFR (CKD-EPI) 101 >60 mL/min/1.73 m 2     *Note: Due to a large number of results and/or encounters for the requested time period, some results have not been displayed. A complete set of results can be found in Results Review.     RADIOLOGY  Radiologist interpretation:   MR-VENOGRAM (MRV) HEAD    (Results Pending)   MR-ORBITS, FACE, NECK-WITH    (Results Pending)   DX-LUMBAR PUNCTURE FOR DIAGNOSIS    (Results Pending)     COURSE & MEDICAL DECISION MAKING    ED Observation Status? No; Patient does not meet criteria for ED Observation.     INITIAL ASSESSMENT, COURSE AND PLAN  Care Narrative: This is a 32 year old medically complicated female who is here with worsening left eye pain and vision changes that she reports feels consistent with prior episodes of optic neuritis. Does also have a history of IIH so this could be a potential etiology of her symptoms as well. No new headache or neurologic symptoms other than the eye pain and visual deficits.     Her vision is worse today than two days ago when it was 20/20 in the right eye and 20/50 in the left. Today it is 20/70 bilaterally. Her pupils are  equal and reactive. Pressures in the eye are also slightly increased from two days ago although I have a lower suspicion for acute angle closure glaucoma. I can no longer appreciate the corneal abrasion seen two days ago. There are no dendritic lesions and there is a negative shanell sign. She is otherwise at her neurologic baseline.    I spoke initially with Dr. Barnes, on-call ophthalmologist, who recommends I attempt to contact Dr. Franks - neuroophthalmologist. If he cannot assist with the patient's care she recommends transfer to an academic center as she is not comfortable treating optic neuritis.    Although not on call, Dr. Franks kindly provided recommendations over the telephone. Based on my report, he is concerned that her symptoms could be due to optic neuritis vs IIH. He recommends an MRI of the orbits with gadolinium, anti-Aquaporin-4 and anti-MOG serum tests, LP with opening pressure and possible therapeutic drainage, admission for 1g solumedrol dailys for the next three days, and he has asked that I re-contact Dr. Barnes and ask her to evaluate the patient in person for papilledema. Dr. Franks does not feel that the patient requires transfer to a tertiary care/academic center.    I spoke again with Dr. Barnes and she has agreed to come to the ER to evaluate the patient in person. She has reiterated that she does not care for patients with optic neuritis or IIH.    First dose of solumedrol was ordered from the ER.    Patient is comfortable with the plan. She was discussed with the hospitalist and admitted in guarded condition.    ADDITIONAL PROBLEM LIST  None  DISPOSITION AND DISCUSSIONS  I have discussed management of the patient with the following physicians and ARIES's:  Dr. Barnes, ophthalmology. Dr. Franks, neuroophthalmology.    Discussion of management with other Q or appropriate source(s): None     Escalation of care considered, and ultimately not performed: N/A.    Barriers to care at  this time, including but not limited to:  None .     Decision tools and prescription drugs considered including, but not limited to:  N/A .    FINAL DIAGNOSIS  Eye pain  Visual changes    Electronically signed by: Dino Morales M.D., 8/18/2023 1:14 PM

## 2023-08-18 NOTE — H&P
[facility] Medicine History & Physical Note    Date of Service  8/18/2023    Primary Care Physician  Torres Brody M.D.    Consultants  ophthalmology, neuro-ophthalmology    Specialist Names: , Dr. Franks     Code Status  Full Code    Chief Complaint  Chief Complaint   Patient presents with    Sent by MD     Patient was seen by jose surgery for worst pain and left eye getting worst. Patient was seen 8/16  and said was dx  with Optic neuritis Patient told to come to ER for IV steroids per jose surgery.        History of Presenting Illness  Kristin Balderrama is a 32 y.o. female with past medical history of idiopathic intracranial hypertension, optic neuritis who presented 8/18/2023 with left eye pain.  Patient reports that her eye pain started on 8/16 in the morning.  She had clear drainage, denied any trauma.  She reports this feels similar to her previous episode of optic neuritis for which she used to see Dr. Yousif neuro-ophthalmology however he has since retired and she has been unable to get follow-up.  Patient had 20/20 vision on that evaluation and despite recommending to stay inpatient for IV steroids patient decided to discharge home.  She was recommended to follow-up with outpatient ophthalmology and given steroids.  Patient reports she went to her outpatient neurosurgery appointment today and they recommended she return to the ER to be admitted as her symptoms are worsening.  Patient denies any fevers, chills, chest pain or shortness of breath.    Discussed at length with ERP, on visual exam patient now seeing 20/70 in both eyes.  Pressure was 20 in the left eye and 14 in the right eye.  ERP discussed with ophthalmology Dr. Barnes and neuro-ophthalmology.  Dr. Barnes will come and evaluate the patient.  Neuro-ophthalmology recommended MRI of the orbits, MRV, IR lumbar puncture for therapeutic tap.  Also requested anti-Mogg and anti aquaporin antibodies.  Recommended 3-day course of IV  CierraMedsanjuanita Ramirez gEffie    I discussed the plan of care with patient and ERP .    Review of Systems  Review of Systems   Constitutional:  Negative for chills and fever.   HENT:  Negative for congestion.    Eyes:  Positive for blurred vision and pain.   Respiratory:  Negative for shortness of breath.    Cardiovascular:  Negative for chest pain.   Gastrointestinal:  Negative for abdominal pain, nausea and vomiting.   Genitourinary:  Negative for dysuria.   Musculoskeletal:  Negative for myalgias.   Skin:  Negative for rash.   Neurological:  Negative for dizziness and headaches.   Psychiatric/Behavioral:  Negative for depression. The patient is nervous/anxious.    All other systems reviewed and are negative.      Past Medical History   has a past medical history of Abdominal pain, Anginal syndrome, Apnea, sleep, Arrhythmia, Arthritis, ASTHMA, Back pain, Borderline personality disorder (HCC), Breath shortness, Bronchitis (02/08/2022), Cardiac arrhythmia, Chickenpox, Chronic UTI (09/18/2010), Cough, Daytime sleepiness, Dental disorder (03/08/2021), Depression, Diabetes (HCC), Diarrhea, Disorder of thyroid, Fall, Fatigue, Frequent headaches, Gasping for breath, Gynecological disorder, Headache(784.0), Heart burn, Heart murmur, History of falling, Indigestion, Migraine, Mitochondrial disease (HCC), Multiple personality disorder (HCC), Nausea, Obesity, Other fatigue (06/29/2020), Pain (08/15/2012), Painful joint, PCOS (polycystic ovarian syndrome), Pneumonia (2012 12/2020), POTS (postural orthostatic tachycardia syndrome), Psychosis (HCC), Ringing in ears, Scoliosis, Shortness of breath, Sinus tachycardia (10/31/2013), Sleep apnea, Snoring, Supraventricular tachycardia (HCC) (4/10/2019), Tonsillitis, Transverse myelitis (HCC), Tuberculosis, Urinary bladder disorder, Urinary incontinence, Weakness, and Wears glasses.    Surgical History   has a past surgical history that includes neuro dest facet l/s w/ig sngl (04/21/2015);  "recovery (01/27/2016); delmar by laparoscopy (08/29/2010); lumbar fusion anterior (08/21/2012); other cardiac surgery (01/2016); tonsillectomy; bowel stimulator insertion (07/13/2016); gastroscopy with balloon dilatation (N/A, 01/04/2017); muscle biopsy (Right, 01/26/2017); other abdominal surgery; laminotomy; bowel stimulator insertion (03/10/2021); pr lap,diagnostic abdomen (02/14/2022); ovarian cystectomy (Right, 02/14/2022); pr percut fix prox/neck femur fx (Left, 01/28/2023); inguinal hernia laparoscopic (Right, 07/21/2023); and hernia repair (Right, 07/21/2023).     Family History  family history includes Genitourinary () Problems in her sister; Heart Disease in her maternal grandmother and mother; Hypertension in her maternal grandmother, maternal uncle, and mother; No Known Problems in her sister; Other in her mother and sister; Sleep Apnea in her mother.   Family history reviewed with patient. There is no family history that is pertinent to the chief complaint.     Social History   reports that she has never smoked. She has never used smokeless tobacco. She reports that she does not currently use drugs after having used the following drugs: Marijuana and Oral. Frequency: 7.00 times per week. She reports that she does not drink alcohol.    Allergies  Allergies   Allergen Reactions    Cefdinir Shortness of Breath and Itching     Tolerated 1/18/17  Tolerates ceftriaxone  Tolerated augmentin 8/2019     Depakote [Divalproex Sodium] Unspecified     Muscle spasms/muscle pain and weakness      Doxycycline Anaphylaxis and Vomiting     Other reaction(s): pustules/blisters  Other reaction(s): stomach pain    Montelukast [Singulair] Unspecified     Cardiac effusion    Vancomycin Itching     Pt becomes flushed in face and chest.   RXN=7/10/16    Wound Dressing Adhesive Rash     By pt report-\"removes skin totally off\"    Amitriptyline Unspecified     Headaches      Aripiprazole Unspecified    Aripiprazole [Abilify] " "Unspecified     Headaches/muscle twitching      Clindamycin Nausea         Other reaction(s): nausea stomach pain    Flomax [Tamsulosin Hydrochloride] Swelling    Levaquin Unspecified     Severe muscle cramps in legs  RXN=unknown  Other reaction(s): leg muscle cramps    Metformin Unspecified     Increased lactic acid     Other reaction(s): itching and rash/nausea vomiting    Tamsulosin Swelling     Swelling of legs    Tape Rash     Tears skin off  coban with Tegaderm tape ok intermittently  RXN=ongoing    Amoxicillin Rash    Depakote [Divalproex Sodium]     Erythromycin Rash     .  Other reaction(s): nausea stomach pain    Hydromorphone      Other reaction(s): vomiting    Montelukast     Ampicillin Rash     Pt reports that she received a rash     Ciprofloxacin Rash          Keflex Rash     Pt states she gets a rash with this medication  Tolerates ceftriaxone  Can take with Benadryl    Levofloxacin Unspecified     Leg muscle cramps    Metronidazole Rash     \"Vision problems\"  Other reaction(s): vision problems    Penicillins Hives     Can take with Benadryl    Sulfa Drugs Itching, Myalgia and Hives     Muscle pain and weakness  Other reaction(s): unknown    Valproic Acid Rash     .       Medications  Prior to Admission Medications   Prescriptions Last Dose Informant Patient Reported? Taking?   Adalimumab 80 MG/0.8ML Pen-injector Kit  Patient Yes No   Sig: Inject 80 mg under the skin every 14 days.   Melatonin 10 MG Tab  Patient Yes No   Sig: Take 10 mg by mouth at bedtime.   albuterol (PROVENTIL) 2.5mg/0.5ml Nebu Soln   Yes No   Sig: Take 2.5 mg by nebulization every four hours as needed for Shortness of Breath.   albuterol 108 (90 Base) MCG/ACT Aero Soln inhalation aerosol  Patient No No   Sig: Inhale 2 Puffs every 6 hours as needed for Shortness of Breath.   ciprofloxacin (CIPRO) 500 MG Tab   Yes No   Sig: Take 500 mg by mouth 2 times a day. 7 day course   dilTIAZem (CARDIZEM) 60 MG Tab  Patient No No   Sig: Take 1 " Tablet by mouth 3 times a day.   diphenhydrAMINE (BENADRYL) 50 MG tablet  Patient Yes No   Sig: Take 50 mg by mouth at bedtime.   docusate sodium (STOOL SOFTENER) 250 MG capsule   Yes No   Sig: Take 250 mg by mouth 2 times a day.   erythromycin 5 MG/GM Ointment   No No   Sig: Apply 1 Application to both eyes 4 times a day for 7 days.   etonogestrel (NEXPLANON) 68 MG Implant implant  Patient Yes No   Sig: Inject 1 Each under the skin see administration instructions. Pt reports she is not sure when she had this medications injected last, pt reports she still has it in her arm  Every 3 years to change   fluconazole (DIFLUCAN) 150 MG tablet   Yes No   Sig: Take 150 mg by mouth every Wednesday.   ivabradine (CORLANOR) 7.5 MG Tab tablet  Patient No No   Sig: Take 1 Tablet by mouth 2 times a day with meals.   metoprolol SR (TOPROL XL) 25 MG TABLET SR 24 HR   No No   Sig: Take 1 Tablet by mouth every day.   naproxen (NAPROSYN) 500 MG Tab  Patient No No   Sig: Take 1 Tablet by mouth 2 times a day with meals.   predniSONE (DELTASONE) 10 MG Tab   No No   Sig: Take 6 Tablets by mouth every day for 5 days.   sodium chloride (SALT) 1 GM Tab  Patient No No   Sig: Take 1 Tablet by mouth 3 times a day with meals.   tizanidine (ZANAFLEX) 4 MG Tab  Patient Yes No   Sig: Take 4 mg by mouth 3 times a day.   topiramate (TOPAMAX) 25 MG Tab   Yes Yes   Sig: Take 25-50 mg by mouth 2 times a day. 1 tablet (25 mg) in the morning 2 tablets (50 mg) at bedtime   ziprasidone (GEODON) 40 MG Cap  Patient No No   Sig: Take 1 capsule by mouth at bedtime.      Facility-Administered Medications: None       Physical Exam  Temp:  [35.8 °C (96.5 °F)-36.5 °C (97.7 °F)] 36.5 °C (97.7 °F)  Pulse:  [61-75] 61  Resp:  [16-18] 18  BP: (149-174)/() 149/84  SpO2:  [96 %-98 %] 96 %  Blood Pressure: (!) 174/104   Temperature: 36.1 °C (97 °F)   Pulse: 67   Respiration: 16   Pulse Oximetry: 98 %       Physical Exam  Vitals and nursing note reviewed.  "  Constitutional:       General: She is not in acute distress.     Appearance: She is obese.   HENT:      Head: Normocephalic and atraumatic.      Right Ear: External ear normal.      Left Ear: External ear normal.      Nose: Nose normal.      Mouth/Throat:      Pharynx: Oropharynx is clear.   Eyes:      Comments: Eye patch over left eye   Right eye EOMI, no discharge   Cardiovascular:      Rate and Rhythm: Normal rate and regular rhythm.      Pulses: Normal pulses.      Heart sounds: Normal heart sounds.   Pulmonary:      Effort: Pulmonary effort is normal. No respiratory distress.      Breath sounds: Normal breath sounds.   Abdominal:      General: Abdomen is flat. There is no distension.      Palpations: Abdomen is soft.      Tenderness: There is no abdominal tenderness.   Musculoskeletal:         General: Normal range of motion.      Cervical back: Normal range of motion and neck supple.      Right lower leg: No edema.      Left lower leg: No edema.   Skin:     General: Skin is warm and dry.   Neurological:      General: No focal deficit present.      Mental Status: She is alert and oriented to person, place, and time.      Cranial Nerves: No cranial nerve deficit.   Psychiatric:         Mood and Affect: Mood is anxious.         Behavior: Behavior normal.         Laboratory:  Recent Labs     08/16/23 1652 08/18/23  1405   WBC 6.0 6.6   RBC 4.65 4.49   HEMOGLOBIN 14.3 14.1   HEMATOCRIT 41.2 40.6   MCV 88.6 90.4   MCH 30.8 31.4   MCHC 34.7 34.7   RDW 39.7 40.5   PLATELETCT 194 170   MPV 11.6 11.4     Recent Labs     08/16/23  1652 08/18/23  1405   SODIUM 141 139   POTASSIUM 3.8 4.5   CHLORIDE 111 110   CO2 16* 17*   GLUCOSE 91 122*   BUN 16 17   CREATININE 0.67 0.79   CALCIUM 8.9 9.2     Recent Labs     08/16/23  1652 08/18/23  1405   ALTSGPT 23  --    ASTSGOT 15  --    ALKPHOSPHAT 68  --    TBILIRUBIN 0.2  --    GLUCOSE 91 122*         No results for input(s): \"NTPROBNP\" in the last 72 hours.      No results for " "input(s): \"TROPONINT\" in the last 72 hours.    Imaging:  MR-VENOGRAM (MRV) HEAD    (Results Pending)   MR-ORBITS, FACE, NECK-WITH    (Results Pending)   DX-LUMBAR PUNCTURE FOR DIAGNOSIS    (Results Pending)       no X-Ray or EKG requiring interpretation    Assessment/Plan:  Justification for Admission Status  I anticipate this patient will require at least two midnights for appropriate medical management, necessitating inpatient admission because started on treatment for optic neuritis needs 3 days total of 1 g IV Solu-Medrol.  Also pending extensive work-up by neuro-ophthalmology including MRI, MRV and a lumbar puncture.  Ophthalmology to come to bedside to evaluate the patient.    * Optic neuritis- (present on admission)  Assessment & Plan  Ophthalmology consulted, will evaluate the patient  Neuro-ophthalmology recommended:  -MRV  -MRI of the orbits with contrast  -IR lumbar puncture for therapeutic tap  -Anti-Mog and anti aquaporin antibodies  -1 g IV Solu-Medrol daily for 3 days  Pain control, supportive care    -Of note patient reports she has an implanted device, has been unable to get it to work.  May be a barrier for MRI, likely will need rep.  Discussed with patient, she will have family try to bring in the remote that at home    Inappropriate sinus tachycardia- (present on admission)  Assessment & Plan  Status post ablation  Follows with outpatient cardiology  Continue ivabradine and metoprolol    Normal pressure hydrocephalus (HCC)- (present on admission)  Assessment & Plan  Neuro-ophthalmology recommending IR lumbar puncture for therapeutic tap    Intracranial hypertension- (present on admission)  Assessment & Plan  History of    Mild intermittent asthma without complication- (present on admission)  Assessment & Plan  No signs of acute exacerbation on exam  Continue albuterol as needed    Morbidly obese (HCC)- (present on admission)  Assessment & Plan  Weight loss counseling when " appropriate    Peripheral neuropathy and chronic pain syndrome (CMS-HCC)- (present on admission)  Assessment & Plan  Continue home naproxen, tizanidine  As needed pain control    Borderline personality disorder in adult (HCC)- (present on admission)  Assessment & Plan  Continue home Topamax and Geodon        VTE prophylaxis: SCDs/TEDs

## 2023-08-18 NOTE — PROGRESS NOTES
Pt arrived to unit at 1650 via [employer]  VSS.  Assessment complete. No signs of distress noted at this time.  Pt reports 9/10 left stabbing eye pain,  declines pain medication at this time. Fall precautions and appropriate signs in place.  Pt educated on fall precautions, aware of risks, bed alarm refused, pt calls appropriately. Pt is steady with a cane. Pt's belongings at bedside.  Pt educated on call light use, phone use and ext numbers provided to pt.  Pt denies any additional needs at this time.  Call light within reach. Will continue to monitor.

## 2023-08-19 ENCOUNTER — APPOINTMENT (OUTPATIENT)
Dept: RADIOLOGY | Facility: MEDICAL CENTER | Age: 33
DRG: 123 | End: 2023-08-19
Attending: INTERNAL MEDICINE
Payer: MEDICARE

## 2023-08-19 LAB
ANION GAP SERPL CALC-SCNC: 16 MMOL/L (ref 7–16)
BUN SERPL-MCNC: 18 MG/DL (ref 8–22)
BURR CELLS/RBC NFR CSF MANUAL: 0 %
CALCIUM SERPL-MCNC: 9.2 MG/DL (ref 8.5–10.5)
CHLORIDE SERPL-SCNC: 107 MMOL/L (ref 96–112)
CLARITY CSF: CLEAR
CO2 SERPL-SCNC: 14 MMOL/L (ref 20–33)
COLOR CSF: COLORLESS
COLOR SPUN CSF: COLORLESS
CREAT SERPL-MCNC: 0.69 MG/DL (ref 0.5–1.4)
CSF COMMENTS 1658: NORMAL
ERYTHROCYTE [DISTWIDTH] IN BLOOD BY AUTOMATED COUNT: 39.8 FL (ref 35.9–50)
GFR SERPLBLD CREATININE-BSD FMLA CKD-EPI: 117 ML/MIN/1.73 M 2
GLUCOSE CSF-MCNC: 116 MG/DL (ref 40–80)
GLUCOSE SERPL-MCNC: 202 MG/DL (ref 65–99)
GRAM STN SPEC: NORMAL
HCT VFR BLD AUTO: 38.7 % (ref 37–47)
HGB BLD-MCNC: 13.2 G/DL (ref 12–16)
INR PPP: 1.08 (ref 0.87–1.13)
MCH RBC QN AUTO: 30.9 PG (ref 27–33)
MCHC RBC AUTO-ENTMCNC: 34.1 G/DL (ref 32.2–35.5)
MCV RBC AUTO: 90.6 FL (ref 81.4–97.8)
NUC CELL # CSF: 9 CELLS/UL (ref 0–10)
PLATELET # BLD AUTO: 170 K/UL (ref 164–446)
PMV BLD AUTO: 11.9 FL (ref 9–12.9)
POTASSIUM SERPL-SCNC: 4.1 MMOL/L (ref 3.6–5.5)
PROT CSF-MCNC: 24 MG/DL (ref 15–45)
PROTHROMBIN TIME: 13.9 SEC (ref 12–14.6)
RBC # BLD AUTO: 4.27 M/UL (ref 4.2–5.4)
RBC # CSF: 431 CELLS/UL
SIGNIFICANT IND 70042: NORMAL
SITE SITE: NORMAL
SODIUM SERPL-SCNC: 137 MMOL/L (ref 135–145)
SOURCE SOURCE: NORMAL
SPECIMEN VOL CSF: 15 ML
TUBE # CSF: 4
TUBE # CSF: NORMAL
WBC # BLD AUTO: 6.5 K/UL (ref 4.8–10.8)

## 2023-08-19 PROCEDURE — 700117 HCHG RX CONTRAST REV CODE 255: Performed by: INTERNAL MEDICINE

## 2023-08-19 PROCEDURE — 009U3ZX DRAINAGE OF SPINAL CANAL, PERCUTANEOUS APPROACH, DIAGNOSTIC: ICD-10-PCS | Performed by: RADIOLOGY

## 2023-08-19 PROCEDURE — A9270 NON-COVERED ITEM OR SERVICE: HCPCS | Performed by: INTERNAL MEDICINE

## 2023-08-19 PROCEDURE — 82945 GLUCOSE OTHER FLUID: CPT

## 2023-08-19 PROCEDURE — 83916 OLIGOCLONAL BANDS: CPT

## 2023-08-19 PROCEDURE — 87205 SMEAR GRAM STAIN: CPT

## 2023-08-19 PROCEDURE — 86052 AQUAPORIN-4 ANTB CBA EACH: CPT

## 2023-08-19 PROCEDURE — 700111 HCHG RX REV CODE 636 W/ 250 OVERRIDE (IP)

## 2023-08-19 PROCEDURE — 70544 MR ANGIOGRAPHY HEAD W/O DYE: CPT

## 2023-08-19 PROCEDURE — 62328 DX LMBR SPI PNXR W/FLUOR/CT: CPT

## 2023-08-19 PROCEDURE — 99233 SBSQ HOSP IP/OBS HIGH 50: CPT | Performed by: STUDENT IN AN ORGANIZED HEALTH CARE EDUCATION/TRAINING PROGRAM

## 2023-08-19 PROCEDURE — 85610 PROTHROMBIN TIME: CPT

## 2023-08-19 PROCEDURE — 84157 ASSAY OF PROTEIN OTHER: CPT

## 2023-08-19 PROCEDURE — 770006 HCHG ROOM/CARE - MED/SURG/GYN SEMI*

## 2023-08-19 PROCEDURE — 700105 HCHG RX REV CODE 258: Performed by: INTERNAL MEDICINE

## 2023-08-19 PROCEDURE — 89051 BODY FLUID CELL COUNT: CPT

## 2023-08-19 PROCEDURE — 85027 COMPLETE CBC AUTOMATED: CPT

## 2023-08-19 PROCEDURE — 80048 BASIC METABOLIC PNL TOTAL CA: CPT

## 2023-08-19 PROCEDURE — 87070 CULTURE OTHR SPECIMN AEROBIC: CPT

## 2023-08-19 PROCEDURE — 36415 COLL VENOUS BLD VENIPUNCTURE: CPT

## 2023-08-19 PROCEDURE — 70543 MRI ORBT/FAC/NCK W/O &W/DYE: CPT

## 2023-08-19 PROCEDURE — A9579 GAD-BASE MR CONTRAST NOS,1ML: HCPCS | Performed by: INTERNAL MEDICINE

## 2023-08-19 PROCEDURE — 700102 HCHG RX REV CODE 250 W/ 637 OVERRIDE(OP): Performed by: INTERNAL MEDICINE

## 2023-08-19 PROCEDURE — 700111 HCHG RX REV CODE 636 W/ 250 OVERRIDE (IP): Mod: JZ | Performed by: INTERNAL MEDICINE

## 2023-08-19 RX ORDER — LORAZEPAM 2 MG/ML
0.5 INJECTION INTRAMUSCULAR ONCE
Status: COMPLETED | OUTPATIENT
Start: 2023-08-19 | End: 2023-08-19

## 2023-08-19 RX ADMIN — TIZANIDINE 4 MG: 4 TABLET ORAL at 15:47

## 2023-08-19 RX ADMIN — NAPROXEN 500 MG: 500 TABLET ORAL at 07:37

## 2023-08-19 RX ADMIN — SODIUM CHLORIDE 1 G: 1 TABLET ORAL at 11:25

## 2023-08-19 RX ADMIN — METOPROLOL SUCCINATE 25 MG: 25 TABLET, EXTENDED RELEASE ORAL at 05:05

## 2023-08-19 RX ADMIN — MORPHINE SULFATE 2 MG: 4 INJECTION, SOLUTION INTRAMUSCULAR; INTRAVENOUS at 13:57

## 2023-08-19 RX ADMIN — MORPHINE SULFATE 2 MG: 4 INJECTION, SOLUTION INTRAMUSCULAR; INTRAVENOUS at 00:39

## 2023-08-19 RX ADMIN — TOPIRAMATE 25 MG: 25 TABLET, FILM COATED ORAL at 05:06

## 2023-08-19 RX ADMIN — SODIUM CHLORIDE 1000 MG: 9 INJECTION, SOLUTION INTRAVENOUS at 17:17

## 2023-08-19 RX ADMIN — IVABRADINE 7.5 MG: 7.5 TABLET, FILM COATED ORAL at 17:08

## 2023-08-19 RX ADMIN — TOPIRAMATE 50 MG: 25 TABLET, FILM COATED ORAL at 20:06

## 2023-08-19 RX ADMIN — DIPHENHYDRAMINE HYDROCHLORIDE 50 MG: 25 TABLET ORAL at 20:07

## 2023-08-19 RX ADMIN — SODIUM CHLORIDE 1 G: 1 TABLET ORAL at 07:37

## 2023-08-19 RX ADMIN — Medication 10 MG: at 20:06

## 2023-08-19 RX ADMIN — IVABRADINE 7.5 MG: 7.5 TABLET, FILM COATED ORAL at 07:37

## 2023-08-19 RX ADMIN — SENNOSIDES AND DOCUSATE SODIUM 2 TABLET: 50; 8.6 TABLET ORAL at 17:08

## 2023-08-19 RX ADMIN — SODIUM CHLORIDE 1 G: 1 TABLET ORAL at 17:08

## 2023-08-19 RX ADMIN — NAPROXEN 500 MG: 500 TABLET ORAL at 17:10

## 2023-08-19 RX ADMIN — SENNOSIDES AND DOCUSATE SODIUM 2 TABLET: 50; 8.6 TABLET ORAL at 05:05

## 2023-08-19 RX ADMIN — TIZANIDINE 4 MG: 4 TABLET ORAL at 10:04

## 2023-08-19 RX ADMIN — GADOTERIDOL 20 ML: 279.3 INJECTION, SOLUTION INTRAVENOUS at 02:07

## 2023-08-19 RX ADMIN — TIZANIDINE 4 MG: 4 TABLET ORAL at 20:07

## 2023-08-19 RX ADMIN — MORPHINE SULFATE 2 MG: 4 INJECTION, SOLUTION INTRAMUSCULAR; INTRAVENOUS at 07:48

## 2023-08-19 RX ADMIN — ZIPRASIDONE HYDROCHLORIDE 40 MG: 40 CAPSULE ORAL at 20:06

## 2023-08-19 RX ADMIN — MORPHINE SULFATE 2 MG: 4 INJECTION, SOLUTION INTRAMUSCULAR; INTRAVENOUS at 20:05

## 2023-08-19 RX ADMIN — LORAZEPAM 0.5 MG: 2 INJECTION INTRAMUSCULAR; INTRAVENOUS at 00:47

## 2023-08-19 ASSESSMENT — PAIN DESCRIPTION - PAIN TYPE
TYPE: ACUTE PAIN

## 2023-08-19 ASSESSMENT — ENCOUNTER SYMPTOMS
SENSORY CHANGE: 0
CHILLS: 0
NERVOUS/ANXIOUS: 0
BLURRED VISION: 1
SORE THROAT: 0
ABDOMINAL PAIN: 0
MYALGIAS: 0
DOUBLE VISION: 0
SPEECH CHANGE: 0
HEADACHES: 1
VOMITING: 0
NAUSEA: 0
FEVER: 0
SHORTNESS OF BREATH: 0
EYE REDNESS: 0
EYE DISCHARGE: 0
SINUS PAIN: 0
EYE PAIN: 1
FOCAL WEAKNESS: 0
DIZZINESS: 0
PHOTOPHOBIA: 1

## 2023-08-19 ASSESSMENT — LIFESTYLE VARIABLES: SUBSTANCE_ABUSE: 0

## 2023-08-19 ASSESSMENT — FIBROSIS 4 INDEX: FIB4 SCORE: 0.61

## 2023-08-19 NOTE — ASSESSMENT & PLAN NOTE
Status post ablation  Follows with outpatient cardiology  Continue ivabradine and metoprolol  Stable, pt is NSR, no need to monitor on tele

## 2023-08-19 NOTE — CONSULTS
DATE OF SERVICE:  08/18/2023     REASON FOR CONSULTATION:  Consultation was requested today to evaluate for   optic nerve edema.     HISTORY OF PRESENT ILLNESS:  It was my pleasure to see the patient at the   bedside today.  She is a very nice 32-year-old woman with a history of optic   neuritis as well as increased intracranial pressure.  She is unclear of the   etiology for either.  She noted eye pain, vision loss, and generalized visual   field loss, left eye greater than right eye, over the past 1-2 days.     PHYSICAL EXAMINATION:  On examination, visual acuity with glasses was 20/70 in   both eyes with a near card.  Pupillary examination was limited due to   photophobia; limited exam was unrevealing for an afferent pupillary   defect.  Motility examination was grossly full; examination was limited due to   significant eye pain with movement.  Anterior segment examination was   unremarkable with a penlight.  Dilated examination showed no appreciable optic   disc edema with a 20 diopter lens bedside exam.     IMPRESSION:  No optic nerve edema appreciated at the bedside.  Would recommend   OCT and formal visual field testing for further evaluation.  Given acute   vision loss and the past ocular history, would recommend evaluation with   neuro-ophthalmologist without delay.        ______________________________  LUZ COLIN MD    [facility]    DD:  08/18/2023 18:41  DT:  08/18/2023 19:19    Job#:  [id number]

## 2023-08-19 NOTE — CARE PLAN
The patient is Stable - Low risk of patient condition declining or worsening    Shift Goals  Clinical Goals: Pain management, procedure preparation  Patient Goals: Patient comfort and decreased stress  Family Goals: JODI    Problem: Knowledge Deficit - Standard  Goal: Patient and family/care givers will demonstrate understanding of plan of care, disease process/condition, diagnostic tests and medications  Outcome: Progressing     Problem: Pain - Standard  Goal: Alleviation of pain or a reduction in pain to the patient’s comfort goal  Outcome: Progressing     Progress made toward(s) clinical / shift goals: Pt has been medicated per MAR for a pain of 9/10. Pt comfort goal is 5/10 or with comfort with movement and rest.     Patient is not progressing towards the following goals:

## 2023-08-19 NOTE — PROGRESS NOTES
Assessed patient for bedside lumbar puncture. Unable to do thoracentesis at bedside, Unable to visualize land marks due to patients obesity, in addition patient has lumbar fusion with plate/screws as well as a sacral bowel/bladder device in pace.     She has hx of idiopathic intercranial hypertension and has required LPs in the past all of which have all required guidance under fluoroscopy due to failed bedside attempts. Patient declines attempt at bedside due to this history. I have discussed with radiology tech who will discuss with radiologist when able.     Greatly appreciate their assistance as pt does need LP with accurate pressure measurement as well as therapeutic TAP for symptomatic intercranial hypertension.     Kerry Benz M.D.

## 2023-08-19 NOTE — ASSESSMENT & PLAN NOTE
Neuro-ophthalmology recommending IR lumbar puncture for therapeutic tap  Completed 8/19, 14 cc removal, normal opening pressure

## 2023-08-19 NOTE — ASSESSMENT & PLAN NOTE
Ophthalmology consulted, no edema noted on bedside exam, recommending OCT and formal visual field testing for further evaluation.  Given acute vision loss and the past ocular history, he is recommending evaluation with     Neuro-ophthalmology recommended:  -MRV and -MRI of the orbits with contrast, unremarkable   -IR lumbar puncture for therapeutic tap, 14cc removed, normal opening pressure on LP   -Anti-Mog and anti aquaporin antibodies, ordered and pending   -1 g IV Solu-Medrol daily for 3 days, this has been started

## 2023-08-19 NOTE — PROGRESS NOTES
Assumed care of pt  at 1900. Report received from Day RN. Pt is A&O x 4. Patient stated that their pain is 5/10. Pt's pain comfort goal is 4/10. Pt educated on how to use the call light, pt verbalized understanding. Bed in lowest locked position, call light within reach, hourly rounding in place. Labs reviewed, orders reviewed, communication board updated. Pt declines any further needs at this time.

## 2023-08-19 NOTE — PROGRESS NOTES
Pt resting in bed, in no distress. Plan for lumbar puncture in radiology at 1400. Pt updated on plan of care and agreeable.

## 2023-08-19 NOTE — PROGRESS NOTES
4 Eyes Skin Assessment Completed by SONYA Mccann and SONYA Rich.    Head WDL  Ears WDL  Nose WDL  Mouth WDL  Neck WDL  Breast/Chest WDL  Shoulder Blades WDL  Spine WDL  (R) Arm/Elbow/Hand WDL  (L) Arm/Elbow/Hand WDL  Abdomen healed Incision from hernia surgery  Groin WDL  Scrotum/Coccyx/Buttocks WDL  (R) Leg WDL  (L) Leg WDL  (R) Heel/Foot/Toe WDL  (L) Heel/Foot/Toe WDL          Devices In Places: N/A       Interventions In Place Pillows    Possible Skin Injury No    Pictures Uploaded Into Epic N/A  Wound Consult Placed N/A  RN Wound Prevention Protocol Ordered No

## 2023-08-19 NOTE — PROGRESS NOTES
"[facility] Medicine Daily Progress Note    Date of Service  8/19/2023    Chief Complaint  Kristin Balderrama is a 32 y.o. female admitted 8/18/2023 with vision loss, eye pain     Hospital Course  Kristin Balderrama is a 32 y.o. female with past medical history of idiopathic intracranial hypertension, optic neuritis,  asthma, nad bipolar/schizophrenia who presented 8/18/2023 with left eye pain.  Patient reports that her eye pain started on 8/16 in the morning.  She had clear drainage, denied any trauma.  She reports this feels similar to her previous episode of optic neuritis for which she used to see Dr. Yousif neuro-ophthalmology however he has since retired and she has been unable to get follow-up.  Patient had 20/20 vision on that evaluation and despite recommending to stay inpatient for IV steroids patient decided to discharge home.  She was recommended to follow-up with outpatient ophthalmology and given steroids.  Patient reports she went to her outpatient neurosurgery appointment today and they recommended she return to the ER to be admitted as her symptoms are worsening.  Patient denies any fevers, chills, chest pain or shortness of breath.     visual exam in ER,  patient now seeing 20/70 in both eyes.  Pressure was 20 in the left eye and 14 in the right eye.  ERP discussed with ophthalmology Dr. Barnes and neuro-ophthalmology.    Dr. Barnes will come and evaluate the patient.  Neuro-ophthalmology recommended MRI of the orbits, MRV, IR lumbar puncture for therapeutic tap.  Also requested anti-Mogg and anti aquaporin antibodies. Recommended 3-day course of IV Solu-Medrol 1 g.    Interval Problem Update  Pt seen and examined, still having eye pain, photosensitivity and \"swooshing\" sound in the back of her head.   - MRI orbits and MRV completed and reviewed, no evidence of optic nerve edema, no venous thrombosis or other acute pathology to explain symptoms.   - pending diagnostic and therapeutic LP with " radiology, appreciate their assistance in obtaining this   - will check glucose/protein/cell count, aguoporins and oligoclonal bands to rule out other path, suspect symptoms may be due to her known idiopathic intracranial hypertension   - appreciate ophtho and neuro-Optho help with management     I have discussed this patient's plan of care and discharge plan at IDT rounds today with Case Management, Nursing, Nursing leadership, and other members of the IDT team.    Consultants/Specialty  ophthalmology    Code Status  Full Code    Disposition  The patient is not medically cleared for discharge to home or a post-acute facility.  Anticipate discharge to: home with close outpatient follow-up    I have placed the appropriate orders for post-discharge needs.    Review of Systems  Review of Systems   Constitutional:  Negative for chills and fever.   HENT:  Negative for sinus pain and sore throat.    Eyes:  Positive for blurred vision, photophobia and pain. Negative for double vision, discharge and redness.   Respiratory:  Negative for shortness of breath.    Cardiovascular:  Negative for chest pain and leg swelling.   Gastrointestinal:  Negative for abdominal pain, nausea and vomiting.   Genitourinary:  Negative for dysuria.   Musculoskeletal:  Negative for myalgias.   Neurological:  Positive for headaches. Negative for dizziness, sensory change, speech change and focal weakness.   Psychiatric/Behavioral:  Negative for substance abuse. The patient is not nervous/anxious.         Physical Exam  Temp:  [36.1 °C (96.9 °F)-36.7 °C (98.1 °F)] 36.1 °C (96.9 °F)  Pulse:  [61-76] 65  Resp:  [16-19] 18  BP: (102-174)/() 129/70  SpO2:  [95 %-98 %] 97 %    Physical Exam  Vitals and nursing note reviewed.   Constitutional:       Appearance: She is obese. She is ill-appearing. She is not toxic-appearing.   HENT:      Head: Normocephalic and atraumatic.      Mouth/Throat:      Mouth: Mucous membranes are dry.      Pharynx:  Oropharynx is clear.   Eyes:      General: No scleral icterus.        Right eye: No discharge.         Left eye: No discharge.      Conjunctiva/sclera: Conjunctivae normal.      Pupils: Pupils are equal, round, and reactive to light.   Cardiovascular:      Rate and Rhythm: Normal rate and regular rhythm.      Heart sounds: Normal heart sounds.   Pulmonary:      Breath sounds: Normal breath sounds.   Abdominal:      General: Bowel sounds are normal.      Palpations: Abdomen is soft.   Musculoskeletal:      Cervical back: Neck supple. No rigidity.      Right lower leg: No edema.      Left lower leg: No edema.   Lymphadenopathy:      Cervical: No cervical adenopathy.   Skin:     General: Skin is warm.   Neurological:      General: No focal deficit present.      Mental Status: She is alert and oriented to person, place, and time. Mental status is at baseline.   Psychiatric:         Mood and Affect: Mood normal.         Behavior: Behavior normal.         Thought Content: Thought content normal.         Judgment: Judgment normal.         Fluids  No intake or output data in the 24 hours ending 08/19/23 1238    Laboratory  Recent Labs     08/16/23  1652 08/18/23  1405 08/19/23  0022   WBC 6.0 6.6 6.5   RBC 4.65 4.49 4.27   HEMOGLOBIN 14.3 14.1 13.2   HEMATOCRIT 41.2 40.6 38.7   MCV 88.6 90.4 90.6   MCH 30.8 31.4 30.9   MCHC 34.7 34.7 34.1   RDW 39.7 40.5 39.8   PLATELETCT 194 170 170   MPV 11.6 11.4 11.9     Recent Labs     08/16/23  1652 08/18/23  1405 08/19/23  0022   SODIUM 141 139 137   POTASSIUM 3.8 4.5 4.1   CHLORIDE 111 110 107   CO2 16* 17* 14*   GLUCOSE 91 122* 202*   BUN 16 17 18   CREATININE 0.67 0.79 0.69   CALCIUM 8.9 9.2 9.2     Recent Labs     08/19/23  1040   INR 1.08               Imaging  MR-ORBITS,FACE,NECK-WITH&W/O & SEQUENCES   Final Result      1.  Partially empty sella. There is an association with idiopathic intracranial hypertension.   2.  Otherwise, unremarkable MRI of the orbits. No imaging  evidence of acute optic neuritis.      MR-VENOGRAM (MRV) HEAD   Final Result      1.  Normal variant dominant right transverse-sigmoid sinus and dominant right internal jugular vein.   2.  No evidence of dural venous sinus thrombosis      DX-LUMBAR PUNCTURE FOR DIAGNOSIS    (Results Pending)        Assessment/Plan  * Optic neuritis- (present on admission)  Assessment & Plan  Ophthalmology consulted, no edema noted on bedside exam, recommending OCT and formal visual field testing for further evaluation.  Given acute vision loss and the past ocular history, he is recommending evaluation with     Neuro-ophthalmology recommended:  -MRV and -MRI of the orbits with contrast, unremarkable   -IR lumbar puncture for therapeutic tap, pending   -Anti-Mog and anti aquaporin antibodies, ordered and pending   -1 g IV Solu-Medrol daily for 3 days, this has been started     Inappropriate sinus tachycardia- (present on admission)  Assessment & Plan  Status post ablation  Follows with outpatient cardiology  Continue ivabradine and metoprolol  Stable, pt is NSR, no need to monitor on tele     Normal pressure hydrocephalus (HCC)- (present on admission)  Assessment & Plan  Neuro-ophthalmology recommending IR lumbar puncture for therapeutic tap  Pending     Intracranial hypertension- (present on admission)  Assessment & Plan  History of, concern for acute symptomatic   -LP for pressure measurement and  therapeutic LP pending       Mild intermittent asthma without complication- (present on admission)  Assessment & Plan  No signs of acute exacerbation on exam  Continue albuterol as needed    Morbidly obese (HCC)- (present on admission)  Assessment & Plan  Weight loss counseling when appropriate    Peripheral neuropathy and chronic pain syndrome (CMS-HCC)- (present on admission)  Assessment & Plan  Continue home naproxen, tizanidine  As needed pain control    Borderline personality disorder in adult (HCC)- (present on admission)  Assessment  & Plan  Continue home Topamax and Geodon         VTE prophylaxis:   SCDs/TEDs      I have performed a physical exam and reviewed and updated ROS and Plan today (8/19/2023). In review of yesterday's note (8/18/2023), there are no changes except as documented above.      Greater than 51 minutes spent prepping to see patient (e.g. review of tests) obtaining and/or reviewing separately obtained history. Performing a medically appropriate examination and/ evaluation.  Counseling and educating the patient/family/caregiver.  Ordering medications, tests, or procedures.  Referring and communicating with other health care professionals.  Documenting clinical information in EPIC.  Independently interpreting results and communicating results to patient/family/caregiver.  Care coordination.

## 2023-08-19 NOTE — PROGRESS NOTES
Assumed care of pt at 0700. Pt Aox4. Resting in bed, in no apparent distress. Reports 8/10 pain to L eye, treated with PRN medication. Call bell within reach.

## 2023-08-19 NOTE — HOSPITAL COURSE
Kristin Balderrama is a 32 y.o. female with past medical history of idiopathic intracranial hypertension, optic neuritis,  asthma, nad bipolar/schizophrenia who presented 8/18/2023 with left eye pain.  Patient reports that her eye pain started on 8/16 in the morning.  She had clear drainage, denied any trauma.  She reports this feels similar to her previous episode of optic neuritis for which she used to see Dr. Yousif neuro-ophthalmology however he has since retired and she has been unable to get follow-up.  Patient had 20/20 vision on that evaluation and despite recommending to stay inpatient for IV steroids patient decided to discharge home.  She was recommended to follow-up with outpatient ophthalmology and given steroids.  Patient reports she went to her outpatient neurosurgery appointment today and they recommended she return to the ER to be admitted as her symptoms are worsening.  Patient denies any fevers, chills, chest pain or shortness of breath.     visual exam in ER,  patient now seeing 20/70 in both eyes.  Pressure was 20 in the left eye and 14 in the right eye.  ERP discussed with ophthalmology Dr. Barnes and neuro-ophthalmology.    Dr. Barnes will come and evaluate the patient.  Neuro-ophthalmology recommended MRI of the orbits, MRV, IR lumbar puncture for therapeutic tap.  Also requested anti-Mogg and anti aquaporin antibodies. Recommended 3-day course of IV Solu-Medrol 1 g.

## 2023-08-19 NOTE — ASSESSMENT & PLAN NOTE
History of, concern for acute symptomatic   -LP for pressure measurement and  therapeutic LP completed, normal pressure, 14 cc removed   Some improvement in her symptoms today

## 2023-08-19 NOTE — PROGRESS NOTES
Pt back to floor from MRI. Pt back in bed and is resting comfortably. Bed locked and in lowest position and call light in reach. No other concerns at this time.

## 2023-08-19 NOTE — CARE PLAN
The patient is Watcher - Medium risk of patient condition declining or worsening    Shift Goals  Clinical Goals: pain control, lumbar puncture  Patient Goals: rest, pain control  Family Goals: JODI    Progress made toward(s) clinical / shift goals:  Pt c/o of L eye pain 8/10. After morphine administration, pain improved to 6/10 which is tolerable level per pt. Lumbar puncture completed this afternoon.     Patient is not progressing towards the following goals:

## 2023-08-20 LAB
ALBUMIN SERPL BCP-MCNC: 4.2 G/DL (ref 3.2–4.9)
ALBUMIN/GLOB SERPL: 2.1 G/DL
ALP SERPL-CCNC: 62 U/L (ref 30–99)
ALT SERPL-CCNC: 20 U/L (ref 2–50)
ANION GAP SERPL CALC-SCNC: 17 MMOL/L (ref 7–16)
AST SERPL-CCNC: 15 U/L (ref 12–45)
BASOPHILS # BLD AUTO: 0.2 % (ref 0–1.8)
BASOPHILS # BLD: 0.01 K/UL (ref 0–0.12)
BILIRUB SERPL-MCNC: 0.2 MG/DL (ref 0.1–1.5)
BUN SERPL-MCNC: 18 MG/DL (ref 8–22)
CALCIUM ALBUM COR SERPL-MCNC: 8.8 MG/DL (ref 8.5–10.5)
CALCIUM SERPL-MCNC: 9 MG/DL (ref 8.5–10.5)
CHLORIDE SERPL-SCNC: 106 MMOL/L (ref 96–112)
CO2 SERPL-SCNC: 15 MMOL/L (ref 20–33)
CREAT SERPL-MCNC: 0.8 MG/DL (ref 0.5–1.4)
EKG IMPRESSION: NORMAL
EOSINOPHIL # BLD AUTO: 0 K/UL (ref 0–0.51)
EOSINOPHIL NFR BLD: 0 % (ref 0–6.9)
ERYTHROCYTE [DISTWIDTH] IN BLOOD BY AUTOMATED COUNT: 38.9 FL (ref 35.9–50)
GFR SERPLBLD CREATININE-BSD FMLA CKD-EPI: 99 ML/MIN/1.73 M 2
GLOBULIN SER CALC-MCNC: 2 G/DL (ref 1.9–3.5)
GLUCOSE SERPL-MCNC: 188 MG/DL (ref 65–99)
HCT VFR BLD AUTO: 38 % (ref 37–47)
HGB BLD-MCNC: 13.3 G/DL (ref 12–16)
IMM GRANULOCYTES # BLD AUTO: 0.05 K/UL (ref 0–0.11)
IMM GRANULOCYTES NFR BLD AUTO: 0.8 % (ref 0–0.9)
LYMPHOCYTES # BLD AUTO: 0.84 K/UL (ref 1–4.8)
LYMPHOCYTES NFR BLD: 13.9 % (ref 22–41)
MCH RBC QN AUTO: 31.2 PG (ref 27–33)
MCHC RBC AUTO-ENTMCNC: 35 G/DL (ref 32.2–35.5)
MCV RBC AUTO: 89.2 FL (ref 81.4–97.8)
MONOCYTES # BLD AUTO: 0.12 K/UL (ref 0–0.85)
MONOCYTES NFR BLD AUTO: 2 % (ref 0–13.4)
NEUTROPHILS # BLD AUTO: 5.03 K/UL (ref 1.82–7.42)
NEUTROPHILS NFR BLD: 83.1 % (ref 44–72)
NRBC # BLD AUTO: 0 K/UL
NRBC BLD-RTO: 0 /100 WBC (ref 0–0.2)
PLATELET # BLD AUTO: 154 K/UL (ref 164–446)
PMV BLD AUTO: 11.4 FL (ref 9–12.9)
POTASSIUM SERPL-SCNC: 4 MMOL/L (ref 3.6–5.5)
PROT SERPL-MCNC: 6.2 G/DL (ref 6–8.2)
RBC # BLD AUTO: 4.26 M/UL (ref 4.2–5.4)
SODIUM SERPL-SCNC: 138 MMOL/L (ref 135–145)
TROPONIN T SERPL-MCNC: <6 NG/L (ref 6–19)
WBC # BLD AUTO: 6.1 K/UL (ref 4.8–10.8)

## 2023-08-20 PROCEDURE — 700102 HCHG RX REV CODE 250 W/ 637 OVERRIDE(OP): Performed by: INTERNAL MEDICINE

## 2023-08-20 PROCEDURE — 700105 HCHG RX REV CODE 258: Performed by: INTERNAL MEDICINE

## 2023-08-20 PROCEDURE — 93010 ELECTROCARDIOGRAM REPORT: CPT | Performed by: INTERNAL MEDICINE

## 2023-08-20 PROCEDURE — A9270 NON-COVERED ITEM OR SERVICE: HCPCS | Performed by: INTERNAL MEDICINE

## 2023-08-20 PROCEDURE — 770006 HCHG ROOM/CARE - MED/SURG/GYN SEMI*

## 2023-08-20 PROCEDURE — 93005 ELECTROCARDIOGRAM TRACING: CPT | Performed by: STUDENT IN AN ORGANIZED HEALTH CARE EDUCATION/TRAINING PROGRAM

## 2023-08-20 PROCEDURE — 99233 SBSQ HOSP IP/OBS HIGH 50: CPT | Performed by: STUDENT IN AN ORGANIZED HEALTH CARE EDUCATION/TRAINING PROGRAM

## 2023-08-20 PROCEDURE — 84484 ASSAY OF TROPONIN QUANT: CPT

## 2023-08-20 PROCEDURE — 85025 COMPLETE CBC W/AUTO DIFF WBC: CPT

## 2023-08-20 PROCEDURE — 80053 COMPREHEN METABOLIC PANEL: CPT

## 2023-08-20 PROCEDURE — A9270 NON-COVERED ITEM OR SERVICE: HCPCS | Performed by: STUDENT IN AN ORGANIZED HEALTH CARE EDUCATION/TRAINING PROGRAM

## 2023-08-20 PROCEDURE — 700102 HCHG RX REV CODE 250 W/ 637 OVERRIDE(OP): Performed by: STUDENT IN AN ORGANIZED HEALTH CARE EDUCATION/TRAINING PROGRAM

## 2023-08-20 PROCEDURE — 700111 HCHG RX REV CODE 636 W/ 250 OVERRIDE (IP): Mod: JZ | Performed by: INTERNAL MEDICINE

## 2023-08-20 RX ORDER — CALCIUM CARBONATE 500 MG/1
500 TABLET, CHEWABLE ORAL 3 TIMES DAILY PRN
Status: DISCONTINUED | OUTPATIENT
Start: 2023-08-20 | End: 2023-08-21 | Stop reason: HOSPADM

## 2023-08-20 RX ADMIN — SENNOSIDES AND DOCUSATE SODIUM 2 TABLET: 50; 8.6 TABLET ORAL at 05:37

## 2023-08-20 RX ADMIN — SODIUM CHLORIDE 1 G: 1 TABLET ORAL at 09:37

## 2023-08-20 RX ADMIN — MORPHINE SULFATE 2 MG: 4 INJECTION, SOLUTION INTRAMUSCULAR; INTRAVENOUS at 05:37

## 2023-08-20 RX ADMIN — METOPROLOL SUCCINATE 25 MG: 25 TABLET, EXTENDED RELEASE ORAL at 05:37

## 2023-08-20 RX ADMIN — MORPHINE SULFATE 2 MG: 4 INJECTION, SOLUTION INTRAMUSCULAR; INTRAVENOUS at 10:30

## 2023-08-20 RX ADMIN — SENNOSIDES AND DOCUSATE SODIUM 2 TABLET: 50; 8.6 TABLET ORAL at 17:51

## 2023-08-20 RX ADMIN — SODIUM CHLORIDE 1 G: 1 TABLET ORAL at 17:51

## 2023-08-20 RX ADMIN — TOPIRAMATE 50 MG: 25 TABLET, FILM COATED ORAL at 19:40

## 2023-08-20 RX ADMIN — NAPROXEN 500 MG: 500 TABLET ORAL at 09:37

## 2023-08-20 RX ADMIN — IVABRADINE 7.5 MG: 7.5 TABLET, FILM COATED ORAL at 17:51

## 2023-08-20 RX ADMIN — LIDOCAINE HYDROCHLORIDE 15 ML: 20 SOLUTION OROPHARYNGEAL at 08:25

## 2023-08-20 RX ADMIN — TOPIRAMATE 25 MG: 25 TABLET, FILM COATED ORAL at 05:37

## 2023-08-20 RX ADMIN — SODIUM CHLORIDE 1000 MG: 9 INJECTION, SOLUTION INTRAVENOUS at 17:54

## 2023-08-20 RX ADMIN — ZIPRASIDONE HYDROCHLORIDE 40 MG: 40 CAPSULE ORAL at 19:41

## 2023-08-20 RX ADMIN — POLYETHYLENE GLYCOL 3350 1 PACKET: 17 POWDER, FOR SOLUTION ORAL at 05:37

## 2023-08-20 RX ADMIN — DIPHENHYDRAMINE HYDROCHLORIDE 50 MG: 25 TABLET ORAL at 19:41

## 2023-08-20 RX ADMIN — PROMETHAZINE HYDROCHLORIDE 25 MG: 25 TABLET ORAL at 16:52

## 2023-08-20 RX ADMIN — SODIUM CHLORIDE 1 G: 1 TABLET ORAL at 14:02

## 2023-08-20 RX ADMIN — TIZANIDINE 4 MG: 4 TABLET ORAL at 09:37

## 2023-08-20 RX ADMIN — MORPHINE SULFATE 2 MG: 4 INJECTION, SOLUTION INTRAMUSCULAR; INTRAVENOUS at 23:51

## 2023-08-20 RX ADMIN — Medication 10 MG: at 19:41

## 2023-08-20 RX ADMIN — IVABRADINE 7.5 MG: 7.5 TABLET, FILM COATED ORAL at 09:38

## 2023-08-20 RX ADMIN — RIVAROXABAN 10 MG: 10 TABLET, FILM COATED ORAL at 17:51

## 2023-08-20 RX ADMIN — TIZANIDINE 4 MG: 4 TABLET ORAL at 14:06

## 2023-08-20 RX ADMIN — ANTACID TABLETS 500 MG: 500 TABLET, CHEWABLE ORAL at 08:25

## 2023-08-20 RX ADMIN — TIZANIDINE 4 MG: 4 TABLET ORAL at 19:41

## 2023-08-20 RX ADMIN — MORPHINE SULFATE 2 MG: 4 INJECTION, SOLUTION INTRAMUSCULAR; INTRAVENOUS at 16:48

## 2023-08-20 RX ADMIN — NAPROXEN 500 MG: 500 TABLET ORAL at 17:51

## 2023-08-20 ASSESSMENT — PAIN DESCRIPTION - PAIN TYPE
TYPE: ACUTE PAIN

## 2023-08-20 ASSESSMENT — LIFESTYLE VARIABLES: SUBSTANCE_ABUSE: 0

## 2023-08-20 ASSESSMENT — ENCOUNTER SYMPTOMS
EYE REDNESS: 0
MYALGIAS: 0
SORE THROAT: 0
SINUS PAIN: 0
SHORTNESS OF BREATH: 0
EYE PAIN: 1
PHOTOPHOBIA: 1
SENSORY CHANGE: 0
NAUSEA: 0
FOCAL WEAKNESS: 0
EYE DISCHARGE: 0
NERVOUS/ANXIOUS: 0
ABDOMINAL PAIN: 0
CHILLS: 0
VOMITING: 0
SPEECH CHANGE: 0
DIZZINESS: 0
FEVER: 0
DOUBLE VISION: 0
HEADACHES: 1
BLURRED VISION: 1

## 2023-08-20 NOTE — PROGRESS NOTES
Assumed pt care at 0700 . Pt is A&Ox 4 . Pt is complaining of heartburn, this nurse notified the hospitalist and requested tums.  Pt's belongings are nearby, bed is in the lowest position and locked.

## 2023-08-20 NOTE — ASSESSMENT & PLAN NOTE
The ASCVD Risk score (Malathi DOTY, et al., 2019) failed to calculate for the following reasons:    The 2019 ASCVD risk score is only valid for ages 40 to 79  Recurrent angina per chart review   Chest pain today   EKG and trop ordered and reviewed, EKG without ischemic changes, trop x 1 negative   Supportive care, GI cocktail ordered   Do not suspect ACS   Continue chronic cardiac meds

## 2023-08-20 NOTE — PROGRESS NOTES
[facility] Medicine Daily Progress Note    Date of Service  8/20/2023    Chief Complaint  Kristin Balderrama is a 32 y.o. female admitted 8/18/2023 with vision loss, eye pain     Hospital Course  Kristin Balderrama is a 32 y.o. female with past medical history of idiopathic intracranial hypertension, optic neuritis,  asthma, nad bipolar/schizophrenia who presented 8/18/2023 with left eye pain.  Patient reports that her eye pain started on 8/16 in the morning.  She had clear drainage, denied any trauma.  She reports this feels similar to her previous episode of optic neuritis for which she used to see Dr. Yousif neuro-ophthalmology however he has since retired and she has been unable to get follow-up.  Patient had 20/20 vision on that evaluation and despite recommending to stay inpatient for IV steroids patient decided to discharge home.  She was recommended to follow-up with outpatient ophthalmology and given steroids.  Patient reports she went to her outpatient neurosurgery appointment today and they recommended she return to the ER to be admitted as her symptoms are worsening.  Patient denies any fevers, chills, chest pain or shortness of breath.     visual exam in ER,  patient now seeing 20/70 in both eyes.  Pressure was 20 in the left eye and 14 in the right eye.  ERP discussed with ophthalmology Dr. Barnes and neuro-ophthalmology.    Dr. Barnes will come and evaluate the patient.  Neuro-ophthalmology recommended MRI of the orbits, MRV, IR lumbar puncture for therapeutic tap.  Also requested anti-Mogg and anti aquaporin antibodies. Recommended 3-day course of IV Solu-Medrol 1 g.    Interval Problem Update  Pt seen and examined, still having eye pain with movement but overall is better, has been able to take the patch off  - had chest pain today with radiation to shoulder, has hx of angina follows closely with cardiology. Improved some with GI cocktail but still with some discomfort. EKG and troponin both ordered  and interpreted. EKG with NSR, no ischemic changes and her trop is negative. Continue her home Corlanor and metoprolol, supportive treatment, I do not suspect ACS   - s/p therapeutic LP yesterday with removal of 14 cc of CSF. Of note her opening pressure was normal.     - MRI orbits and MRV completed and reviewed, no evidence of optic nerve edema, no venous thrombosis or other acute pathology to explain symptoms.   - CSF unremarkable   - appreciate ophtho and neuro-Optho help with management     I have discussed this patient's plan of care and discharge plan at IDT rounds today with Case Management, Nursing, Nursing leadership, and other members of the IDT team.    Consultants/Specialty  ophthalmology    Code Status  Full Code    Disposition  The patient is not medically cleared for discharge to home or a post-acute facility.      I have placed the appropriate orders for post-discharge needs.    Review of Systems  Review of Systems   Constitutional:  Negative for chills and fever.   HENT:  Negative for sinus pain and sore throat.    Eyes:  Positive for blurred vision, photophobia and pain. Negative for double vision, discharge and redness.   Respiratory:  Negative for shortness of breath.    Cardiovascular:  Positive for chest pain. Negative for leg swelling.   Gastrointestinal:  Negative for abdominal pain, nausea and vomiting.   Genitourinary:  Negative for dysuria.   Musculoskeletal:  Negative for myalgias.   Neurological:  Positive for headaches. Negative for dizziness, sensory change, speech change and focal weakness.   Psychiatric/Behavioral:  Negative for substance abuse. The patient is not nervous/anxious.         Physical Exam  Temp:  [36.1 °C (97 °F)-36.5 °C (97.7 °F)] 36.5 °C (97.7 °F)  Pulse:  [61-68] 61  Resp:  [16-20] 20  BP: (119-148)/(62-85) 148/85  SpO2:  [96 %-97 %] 96 %    Physical Exam  Vitals and nursing note reviewed.   Constitutional:       Appearance: She is obese. She is ill-appearing. She  is not toxic-appearing.   HENT:      Head: Normocephalic and atraumatic.      Mouth/Throat:      Mouth: Mucous membranes are dry.      Pharynx: Oropharynx is clear.   Eyes:      General: No scleral icterus.        Right eye: No discharge.         Left eye: No discharge.      Extraocular Movements: Extraocular movements intact.      Conjunctiva/sclera: Conjunctivae normal.      Pupils: Pupils are equal, round, and reactive to light.   Cardiovascular:      Rate and Rhythm: Normal rate and regular rhythm.      Heart sounds: Normal heart sounds.   Pulmonary:      Breath sounds: Normal breath sounds.   Abdominal:      General: Bowel sounds are normal.      Palpations: Abdomen is soft.   Musculoskeletal:      Cervical back: Neck supple. No rigidity.      Right lower leg: No edema.      Left lower leg: No edema.   Lymphadenopathy:      Cervical: No cervical adenopathy.   Skin:     General: Skin is warm.   Neurological:      General: No focal deficit present.      Mental Status: She is alert and oriented to person, place, and time. Mental status is at baseline.   Psychiatric:         Mood and Affect: Mood normal.         Behavior: Behavior normal.         Thought Content: Thought content normal.         Judgment: Judgment normal.         Fluids    Intake/Output Summary (Last 24 hours) at 8/20/2023 1010  Last data filed at 8/20/2023 0400  Gross per 24 hour   Intake 240 ml   Output --   Net 240 ml       Laboratory  Recent Labs     08/18/23  1405 08/19/23  0022 08/20/23  0023   WBC 6.6 6.5 6.1   RBC 4.49 4.27 4.26   HEMOGLOBIN 14.1 13.2 13.3   HEMATOCRIT 40.6 38.7 38.0   MCV 90.4 90.6 89.2   MCH 31.4 30.9 31.2   MCHC 34.7 34.1 35.0   RDW 40.5 39.8 38.9   PLATELETCT 170 170 154*   MPV 11.4 11.9 11.4     Recent Labs     08/18/23  1405 08/19/23  0022 08/20/23  0023   SODIUM 139 137 138   POTASSIUM 4.5 4.1 4.0   CHLORIDE 110 107 106   CO2 17* 14* 15*   GLUCOSE 122* 202* 188*   BUN 17 18 18   CREATININE 0.79 0.69 0.80   CALCIUM 9.2  9.2 9.0     Recent Labs     08/19/23  1040   INR 1.08               Imaging  DX-LUMBAR PUNCTURE FOR DIAGNOSIS   Final Result      Fluoroscopic-guided lumbar puncture as described above.      Opening pressure was not elevated.      MR-ORBITS,FACE,NECK-WITH&W/O & SEQUENCES   Final Result      1.  Partially empty sella. There is an association with idiopathic intracranial hypertension.   2.  Otherwise, unremarkable MRI of the orbits. No imaging evidence of acute optic neuritis.      MR-VENOGRAM (MRV) HEAD   Final Result      1.  Normal variant dominant right transverse-sigmoid sinus and dominant right internal jugular vein.   2.  No evidence of dural venous sinus thrombosis           Assessment/Plan  * Optic neuritis- (present on admission)  Assessment & Plan  Ophthalmology consulted, no edema noted on bedside exam, recommending OCT and formal visual field testing for further evaluation.  Given acute vision loss and the past ocular history, he is recommending evaluation with     Neuro-ophthalmology recommended:  -MRV and -MRI of the orbits with contrast, unremarkable   -IR lumbar puncture for therapeutic tap, 14cc removed, normal opening pressure on LP   -Anti-Mog and anti aquaporin antibodies, ordered and pending   -1 g IV Solu-Medrol daily for 3 days, this has been started     Inappropriate sinus tachycardia- (present on admission)  Assessment & Plan  Status post ablation  Follows with outpatient cardiology  Continue ivabradine and metoprolol  Stable, pt is NSR, no need to monitor on tele     Normal pressure hydrocephalus (HCC)- (present on admission)  Assessment & Plan  Neuro-ophthalmology recommending IR lumbar puncture for therapeutic tap  Completed 8/19, 14 cc removal, normal opening pressure     Intracranial hypertension- (present on admission)  Assessment & Plan  History of, concern for acute symptomatic   -LP for pressure measurement and  therapeutic LP completed, normal pressure, 14 cc removed   Some  improvement in her symptoms today       Mild intermittent asthma without complication- (present on admission)  Assessment & Plan  No signs of acute exacerbation on exam  Continue albuterol as needed    Morbidly obese (HCC)- (present on admission)  Assessment & Plan  Weight loss counseling when appropriate    Peripheral neuropathy and chronic pain syndrome (CMS-HCC)- (present on admission)  Assessment & Plan  Continue home naproxen, tizanidine  As needed pain control    Atypical chest pain- (present on admission)  Assessment & Plan  The ASCVD Risk score (Malathi DOTY, et al., 2019) failed to calculate for the following reasons:    The 2019 ASCVD risk score is only valid for ages 40 to 79  Recurrent angina per chart review   Chest pain today   EKG and trop ordered and reviewed, EKG without ischemic changes, trop x 1 negative   Supportive care, GI cocktail ordered   Do not suspect ACS   Continue chronic cardiac meds     Borderline personality disorder in adult (HCC)- (present on admission)  Assessment & Plan  Continue home Topamax and Geodon         VTE prophylaxis:    Xarelto 10mg daily as prophylaxis      I have performed a physical exam and reviewed and updated ROS and Plan today (8/20/2023). In review of yesterday's note (8/19/2023), there are no changes except as documented above.      Greater than 51 minutes spent prepping to see patient (e.g. review of tests) obtaining and/or reviewing separately obtained history. Performing a medically appropriate examination and/ evaluation.  Counseling and educating the patient/family/caregiver.  Ordering medications, tests, or procedures.  Referring and communicating with other health care professionals.  Documenting clinical information in EPIC.  Independently interpreting results and communicating results to patient/family/caregiver.  Care coordination.

## 2023-08-20 NOTE — CARE PLAN
The patient is Stable - Low risk of patient condition declining or worsening    Shift Goals  Clinical Goals: Pain control  Patient Goals: adequate rest  Family Goals: JODI    Progress made toward(s) clinical / shift goals:    Problem: Knowledge Deficit - Standard  Goal: Patient and family/care givers will demonstrate understanding of plan of care, disease process/condition, diagnostic tests and medications  Outcome: Progressing     Problem: Pain - Standard  Goal: Alleviation of pain or a reduction in pain to the patient’s comfort goal of 3/10    Outcome: Progressing       Patient is not progressing towards the following goals:

## 2023-08-20 NOTE — DIETARY
NUTRITION SERVICES: BMI - Pt with BMI >40 (=Body mass index is 44.74 kg/m².), Class III obesity. Weight loss counseling not appropriate in acute care setting. RECOMMEND - If appropriate at DC please refer to outpatient nutrition services for weight management.

## 2023-08-20 NOTE — PROGRESS NOTES
Assumed care of pt  at 1900. Report received from Day RN. Pt is A&O x 4. Patient stated that their pain is 9/10. Pt medicated per MAR with morphine. Pt's pain comfort goal is 4/10 or comfort with movement and ability to rest. Pt educated on how to use the call light, pt verbalized understanding. Bed in lowest locked position, call light within reach, hourly rounding in place. Labs reviewed, orders reviewed, communication board updated. Pt declines any further needs at this time.

## 2023-08-20 NOTE — PROGRESS NOTES
Pt complaining of new pain in her chest, 8/10, radiating to her upper back. Vital signs: Temp- 97.4, SpO2- 96%, BP- 174/101, Respirations 18, HR 72. Dr. Benz notified. EKG and tropes ordered

## 2023-08-20 NOTE — CARE PLAN
The patient is Stable - Low risk of patient condition declining or worsening    Shift Goals  Clinical Goals: Pain control  Patient Goals: adequate rest  Family Goals: JODI    Problem: Knowledge Deficit - Standard  Goal: Patient and family/care givers will demonstrate understanding of plan of care, disease process/condition, diagnostic tests and medications  Outcome: Progressing     Problem: Pain - Standard  Goal: Alleviation of pain or a reduction in pain to the patient’s comfort goal  Outcome: Progressing     Progress made toward(s) clinical / shift goals:  Patient has been medicated per MAR for a pain 9/10. Pt reports a pain decrease to 6/10. Pt states they are now able to rest. Pt has been able to remove the eye patch from left eye and is able to better tolerate exposure to light in the eye this shift.     Patient is not progressing towards the following goals:

## 2023-08-21 ENCOUNTER — PATIENT OUTREACH (OUTPATIENT)
Dept: SCHEDULING | Facility: IMAGING CENTER | Age: 33
End: 2023-08-21
Payer: MEDICARE

## 2023-08-21 VITALS
BODY MASS INDEX: 44.53 KG/M2 | HEART RATE: 64 BPM | SYSTOLIC BLOOD PRESSURE: 148 MMHG | DIASTOLIC BLOOD PRESSURE: 98 MMHG | WEIGHT: 260.8 LBS | HEIGHT: 64 IN | OXYGEN SATURATION: 95 % | TEMPERATURE: 96.9 F | RESPIRATION RATE: 19 BRPM

## 2023-08-21 LAB
ANION GAP SERPL CALC-SCNC: 12 MMOL/L (ref 7–16)
AQP4 H2O CHANNEL AB SERPL IA-ACNC: <1.5 U/ML
BASOPHILS # BLD AUTO: 0 % (ref 0–1.8)
BASOPHILS # BLD: 0 K/UL (ref 0–0.12)
BUN SERPL-MCNC: 22 MG/DL (ref 8–22)
CALCIUM SERPL-MCNC: 9 MG/DL (ref 8.5–10.5)
CHLORIDE SERPL-SCNC: 105 MMOL/L (ref 96–112)
CO2 SERPL-SCNC: 17 MMOL/L (ref 20–33)
CREAT SERPL-MCNC: 0.81 MG/DL (ref 0.5–1.4)
EOSINOPHIL # BLD AUTO: 0 K/UL (ref 0–0.51)
EOSINOPHIL NFR BLD: 0 % (ref 0–6.9)
ERYTHROCYTE [DISTWIDTH] IN BLOOD BY AUTOMATED COUNT: 38.2 FL (ref 35.9–50)
GFR SERPLBLD CREATININE-BSD FMLA CKD-EPI: 98 ML/MIN/1.73 M 2
GLUCOSE SERPL-MCNC: 159 MG/DL (ref 65–99)
HCT VFR BLD AUTO: 39.6 % (ref 37–47)
HGB BLD-MCNC: 13.7 G/DL (ref 12–16)
IMM GRANULOCYTES # BLD AUTO: 0.08 K/UL (ref 0–0.11)
IMM GRANULOCYTES NFR BLD AUTO: 1.3 % (ref 0–0.9)
LYMPHOCYTES # BLD AUTO: 0.81 K/UL (ref 1–4.8)
LYMPHOCYTES NFR BLD: 13.1 % (ref 22–41)
MCH RBC QN AUTO: 30.9 PG (ref 27–33)
MCHC RBC AUTO-ENTMCNC: 34.6 G/DL (ref 32.2–35.5)
MCV RBC AUTO: 89.2 FL (ref 81.4–97.8)
MOG AB SER QL CBA IFA: NORMAL
MONOCYTES # BLD AUTO: 0.13 K/UL (ref 0–0.85)
MONOCYTES NFR BLD AUTO: 2.1 % (ref 0–13.4)
NEUTROPHILS # BLD AUTO: 5.18 K/UL (ref 1.82–7.42)
NEUTROPHILS NFR BLD: 83.5 % (ref 44–72)
NRBC # BLD AUTO: 0 K/UL
NRBC BLD-RTO: 0 /100 WBC (ref 0–0.2)
PLATELET # BLD AUTO: 146 K/UL (ref 164–446)
PMV BLD AUTO: 11.8 FL (ref 9–12.9)
POTASSIUM SERPL-SCNC: 4.1 MMOL/L (ref 3.6–5.5)
RBC # BLD AUTO: 4.44 M/UL (ref 4.2–5.4)
SODIUM SERPL-SCNC: 134 MMOL/L (ref 135–145)
WBC # BLD AUTO: 6.2 K/UL (ref 4.8–10.8)

## 2023-08-21 PROCEDURE — 80048 BASIC METABOLIC PNL TOTAL CA: CPT

## 2023-08-21 PROCEDURE — A9270 NON-COVERED ITEM OR SERVICE: HCPCS | Performed by: INTERNAL MEDICINE

## 2023-08-21 PROCEDURE — 85025 COMPLETE CBC W/AUTO DIFF WBC: CPT

## 2023-08-21 PROCEDURE — 700111 HCHG RX REV CODE 636 W/ 250 OVERRIDE (IP): Mod: JZ | Performed by: INTERNAL MEDICINE

## 2023-08-21 PROCEDURE — 700102 HCHG RX REV CODE 250 W/ 637 OVERRIDE(OP): Performed by: INTERNAL MEDICINE

## 2023-08-21 PROCEDURE — 99239 HOSP IP/OBS DSCHRG MGMT >30: CPT | Performed by: STUDENT IN AN ORGANIZED HEALTH CARE EDUCATION/TRAINING PROGRAM

## 2023-08-21 RX ORDER — OMEPRAZOLE 20 MG/1
20 CAPSULE, DELAYED RELEASE ORAL DAILY
Qty: 21 CAPSULE | Refills: 0 | Status: SHIPPED | OUTPATIENT
Start: 2023-08-21 | End: 2023-09-11

## 2023-08-21 RX ORDER — PREDNISONE 50 MG/1
100 TABLET ORAL DAILY
Qty: 30 TABLET | Refills: 0 | Status: SHIPPED | OUTPATIENT
Start: 2023-08-21 | End: 2023-09-05

## 2023-08-21 RX ADMIN — SODIUM CHLORIDE 1 G: 1 TABLET ORAL at 11:10

## 2023-08-21 RX ADMIN — METOPROLOL SUCCINATE 25 MG: 25 TABLET, EXTENDED RELEASE ORAL at 04:58

## 2023-08-21 RX ADMIN — IVABRADINE 7.5 MG: 7.5 TABLET, FILM COATED ORAL at 08:11

## 2023-08-21 RX ADMIN — MORPHINE SULFATE 2 MG: 4 INJECTION, SOLUTION INTRAMUSCULAR; INTRAVENOUS at 10:24

## 2023-08-21 RX ADMIN — TOPIRAMATE 25 MG: 25 TABLET, FILM COATED ORAL at 04:58

## 2023-08-21 RX ADMIN — TIZANIDINE 4 MG: 4 TABLET ORAL at 08:10

## 2023-08-21 RX ADMIN — SENNOSIDES AND DOCUSATE SODIUM 2 TABLET: 50; 8.6 TABLET ORAL at 04:58

## 2023-08-21 RX ADMIN — SODIUM CHLORIDE 1 G: 1 TABLET ORAL at 08:10

## 2023-08-21 RX ADMIN — NAPROXEN 500 MG: 500 TABLET ORAL at 08:10

## 2023-08-21 ASSESSMENT — PAIN DESCRIPTION - PAIN TYPE
TYPE: ACUTE PAIN

## 2023-08-21 NOTE — PROGRESS NOTES
D/c instructions given, educated on worsening s/s. Pt understands and questions answered. D/c home with family

## 2023-08-21 NOTE — PROGRESS NOTES
Assumed care of pt  at 1900. Report received from Day RN. Pt is A&O x 4. Patient stated that their pain is 7/10. Pt's pain comfort goal is 4/10. Pt repositioned for comfort, lights dimmed, and pillows are used for support. Warm blankets given to the pt  Pt educated on how to use the call light, pt verbalized understanding. Bed in lowest locked position, call light within reach, hourly rounding in place. Labs reviewed, orders reviewed, communication board updated. Pt declines any further needs at this time.

## 2023-08-21 NOTE — DISCHARGE SUMMARY
Discharge Summary    CHIEF COMPLAINT ON ADMISSION  Chief Complaint   Patient presents with    Sent by MD     Patient was seen by [facility] surgery for worst pain and left eye getting worst. Patient was seen 8/16  and said was dx  with Optic neuritis Patient told to come to ER for IV steroids per [facility].        Reason for Admission  Sent by MD     Admission Date  8/18/2023    CODE STATUS  Full Code    HPI & HOSPITAL COURSE    Kristin Balderrama is a 32 y.o. female with past medical history of idiopathic intracranial hypertension, optic neuritis, asthma, and bipolar/schizophrenia who presented 8/18/2023 with left eye pain.  Patient reports that her eye pain started on 8/16 in the morning.  She had clear drainage, denied any trauma.  She reports this feels similar to her previous episode of optic neuritis for which she used to see Dr. Yousif neuro-ophthalmology however he has since retired and she has been unable to get follow-up. She was seen in the ER on 8/16 for her symptoms, she had 20/20 vision on that evaluation and despite recommending to stay inpatient for IV steroids patient decided to discharge home.  She was recommended to follow-up with outpatient ophthalmology and given steroids.  Patient reports she went to her outpatient neurosurgery appointment  on 8/18 and they recommended she return to the ER to be admitted as her symptoms are worsening.  Patient denies any fevers, chills, chest pain or shortness of breath.     visual exam was done in ER,  patient now seeing 20/70 in both eyes.  Pressure was 20 in the left eye and 14 in the right eye.  ERP discussed with ophthalmology Dr. Barnes and neuro-ophthalmology.    Dr. Barnes did come and evaluate the patient, vision tested 20/70, no optic disc edema with dilation was noted, neuro-opth was recommended. They were called on admission and recommended MRI of the orbits, MRV, IR lumbar puncture for therapeutic tap.  Also requested anti-Mogg and anti aquaporin  "antibodies. Recommended 3-day course of IV Solu-Medrol 1 g.MRI of orbits and MRV were unremarkable for evidence of papilledema or acute optic neuritis and no evidence of dural venous thrombosis. Lumbar puncture was done and had normal opening pressure, 14cc was removed. She was started on 1g IV steroids x 3 days. She did have some improvement of her symptoms. I did speak with neuro-optho about the above findings and they recommend follow up with their office this week and a 1mg/kg oral steroid. Given pt obesity she was given 100 mg oral prednisone and instruction to follow up with neuro ophthalmology.   At time of discharge she is hemodynamically stable and her presenting symptoms are improved.     Therefore, she is discharged in fair and stable condition to home with close outpatient follow-up.    The patient met 2-midnight criteria for an inpatient stay at the time of discharge.    Discharge Date  8/21/2023    FOLLOW UP ITEMS POST DISCHARGE  Neuro-ophthalmology   Follow up on immune studies   Chronic medical conditions      DISCHARGE DIAGNOSES  Principal Problem:    Optic neuritis (POA: Yes)  Active Problems:    Borderline personality disorder in adult (HCC) (POA: Yes)      Overview: 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      \"per patients grandmother\"    Atypical chest pain (POA: Yes)    Peripheral neuropathy and chronic pain syndrome (CMS-HCC) (Chronic) (POA: Yes)    Morbidly obese (HCC) (POA: Yes)      Overview: IMO load March 2020    Supraventricular tachycardia (HCC) (POA: Yes)    Mild intermittent asthma without complication (POA: Yes)      Overview: when around smoke    Intracranial hypertension (POA: Yes)    Normal pressure hydrocephalus (HCC) (POA: Yes)    Inappropriate sinus tachycardia (POA: Yes)  Resolved Problems:    * No resolved hospital problems. *      FOLLOW UP  No future appointments.  John Franks M.D.  [address] [facility] [address]  [place] " [id number]  [phone]    Schedule an appointment as soon as possible for a visit in 1 week(s)  call to schedule, they will get you in within the week      MEDICATIONS ON DISCHARGE     Medication List        START taking these medications        Instructions   omeprazole 20 MG delayed-release capsule  Commonly known as: PriLOSEC   Take 1 Capsule by mouth every day for 21 days.  Dose: 20 mg            CHANGE how you take these medications        Instructions   predniSONE 50 MG Tabs  What changed:   medication strength  how much to take  Commonly known as: Deltasone   Take 2 Tablets by mouth every day for 15 days.  Dose: 100 mg            CONTINUE taking these medications        Instructions   adalimumab 80 MG/0.8ML injection  Commonly known as: Humira   Inject 80 mg under the skin every 14 days.  Dose: 80 mg     * albuterol 2.5mg/0.5ml Nebu  Commonly known as: Proventil   Take 2.5 mg by nebulization every four hours as needed for Shortness of Breath.  Dose: 2.5 mg     * albuterol 108 (90 Base) MCG/ACT Aers inhalation aerosol   Inhale 2 Puffs every 6 hours as needed for Shortness of Breath.  Dose: 2 Puff     Corlanor 7.5 MG Tabs tablet  Generic drug: ivabradine   Take 1 Tablet by mouth 2 times a day with meals.  Dose: 7.5 mg     diphenhydrAMINE 50 MG tablet  Commonly known as: Benadryl   Take 50 mg by mouth at bedtime.  Dose: 50 mg     erythromycin 5 MG/GM Oint   Apply 1 Application to both eyes 4 times a day for 7 days.  Dose: 1 Application     etonogestrel 68 MG Impl implant  Commonly known as: Nexplanon   Inject 1 Each under the skin see administration instructions. Pt reports she is not sure when she had this medications injected last, pt reports she still has it in her arm  Every 3 years to change  Dose: 68 mg     fluconazole 150 MG tablet  Commonly known as: Diflucan   Take 150 mg by mouth every Wednesday.  Dose: 150 mg     Melatonin 10 MG Tabs   Take 10 mg by mouth at bedtime.  Dose: 10 mg     metoprolol SR  "25 MG Tb24  Commonly known as: Toprol XL   Take 1 Tablet by mouth every day.  Dose: 25 mg     naproxen 500 MG Tabs  Commonly known as: Naprosyn   Take 1 Tablet by mouth 2 times a day with meals.  Dose: 500 mg     sodium chloride 1 GM Tabs  Commonly known as: Salt   Take 1 Tablet by mouth 3 times a day with meals.  Dose: 1 g     Stool Softener 250 MG capsule  Generic drug: docusate sodium   Take 250 mg by mouth 2 times a day.  Dose: 250 mg     tizanidine 4 MG Tabs  Commonly known as: Zanaflex   Take 4 mg by mouth 3 times a day.  Dose: 4 mg     topiramate 25 MG Tabs  Commonly known as: Topamax   Take 25-50 mg by mouth 2 times a day. 1 tablet (25 mg) in the morning 2 tablets (50 mg) at bedtime  Dose: 25-50 mg     ziprasidone 40 MG Caps  Commonly known as: Geodon   Take 1 capsule by mouth at bedtime.  Dose: 40 mg           * This list has 2 medication(s) that are the same as other medications prescribed for you. Read the directions carefully, and ask your doctor or other care provider to review them with you.                  Allergies  Allergies   Allergen Reactions    Cefdinir Shortness of Breath and Itching     Tolerated 1/18/17  Tolerates ceftriaxone  Tolerated augmentin 8/2019     Depakote [Divalproex Sodium] Unspecified     Muscle spasms/muscle pain and weakness      Doxycycline Anaphylaxis and Vomiting     Other reaction(s): pustules/blisters  Other reaction(s): stomach pain    Montelukast [Singulair] Unspecified     Cardiac effusion    Vancomycin Itching     Pt becomes flushed in face and chest.   RXN=7/10/16    Wound Dressing Adhesive Rash     By pt report-\"removes skin totally off\"    Amitriptyline Unspecified     Headaches      Aripiprazole Unspecified    Aripiprazole [Abilify] Unspecified     Headaches/muscle twitching      Clindamycin Nausea         Other reaction(s): nausea stomach pain    Flomax [Tamsulosin Hydrochloride] Swelling    Levaquin Unspecified     Severe muscle cramps in " "legs  RXN=unknown  Other reaction(s): leg muscle cramps    Metformin Unspecified     Increased lactic acid     Other reaction(s): itching and rash/nausea vomiting    Tamsulosin Swelling     Swelling of legs    Tape Rash     Tears skin off  coban with Tegaderm tape ok intermittently  RXN=ongoing    Amoxicillin Rash    Depakote [Divalproex Sodium]     Erythromycin Rash     .  Other reaction(s): nausea stomach pain    Hydromorphone      Other reaction(s): vomiting    Montelukast     Ampicillin Rash     Pt reports that she received a rash     Ciprofloxacin Rash          Keflex Rash     Pt states she gets a rash with this medication  Tolerates ceftriaxone  Can take with Benadryl    Levofloxacin Unspecified     Leg muscle cramps    Metronidazole Rash     \"Vision problems\"  Other reaction(s): vision problems    Penicillins Hives     Can take with Benadryl    Sulfa Drugs Itching, Myalgia and Hives     Muscle pain and weakness  Other reaction(s): unknown    Valproic Acid Rash     .       DIET  Orders Placed This Encounter   Procedures    Diet Order Diet: Regular     Standing Status:   Standing     Number of Occurrences:   1     Order Specific Question:   Diet:     Answer:   Regular [1]       ACTIVITY  As tolerated.      CONSULTATIONS  Opthalmology     PROCEDURES  Lumbar puncture 8/19/2023    LABORATORY  Lab Results   Component Value Date    SODIUM 134 (L) 08/21/2023    POTASSIUM 4.1 08/21/2023    CHLORIDE 105 08/21/2023    CO2 17 (L) 08/21/2023    GLUCOSE 159 (H) 08/21/2023    BUN 22 08/21/2023    CREATININE 0.81 08/21/2023    CREATININE 0.75 (L) 07/20/2010    GLOMRATE >59 07/20/2010        Lab Results   Component Value Date    WBC 6.2 08/21/2023    WBC 6.1 07/20/2010    HEMOGLOBIN 13.7 08/21/2023    HEMATOCRIT 39.6 08/21/2023    PLATELETCT 146 (L) 08/21/2023        Total time of the discharge process exceeds 45 minutes.  "

## 2023-08-21 NOTE — PROGRESS NOTES
Pt alert and oriented x4. On room air. Call light and personal items in reach. No needs voiced at this time. Cane at bedside. Bed wheells locked,  bed in lowest position.

## 2023-08-21 NOTE — CARE PLAN
The patient is Stable - Low risk of patient condition declining or worsening    Shift Goals  Clinical Goals: Pain management  Patient Goals: adequate rest  Family Goals: JODI    Progress made toward(s) clinical / shift goals:  Pt reports a pain of 9/10. Pt medicated per MAR and reports a decrease of pain of 5/10, which is comfortable per pt.     Problem: Knowledge Deficit - Standard  Goal: Patient and family/care givers will demonstrate understanding of plan of care, disease process/condition, diagnostic tests and medications  8/21/2023 0002 by Hillary Handley R.N.  Outcome: Progressing       Problem: Pain - Standard  Goal: Alleviation of pain or a reduction in pain to the patient’s comfort goal  8/21/2023 0002 by Hillary Handley, R.N.  Outcome: Progressing       Patient is not progressing towards the following goals:

## 2023-08-21 NOTE — PROGRESS NOTES
Pt. Has discharge orders, Home medication returned to her from patient medication bin. Pt notified ride of discharge, pt states ride will be here at 1200. Pt. Instructed on discharge lounge instructions and gathering all belongings.

## 2023-08-21 NOTE — RESPIRATORY CARE
COPD EDUCATION by COPD CLINICAL EDUCATOR  8/21/2023 at 8:43 AM by Meagan Willams RRT     Patient reviewed by COPD education team. Patient does not have a diagnosis history of COPD, patient has Asthma and never smoked. Therefore the patient does not qualify for the COPD program.

## 2023-08-22 LAB
AQP4 H2O CHANNEL IGG CSF QL: NORMAL
BACTERIA CSF CULT: NORMAL
GRAM STN SPEC: NORMAL
OLIGOCLONAL BANDS CSF ELPH-IMP: NORMAL
OLIGOCLONAL BANDS CSF IEF: NORMAL BANDS (ref 0–1)
OLIGOCLONAL BANDS.IT SER+CSF QL: NEGATIVE
SIGNIFICANT IND 70042: NORMAL
SITE SITE: NORMAL
SOURCE SOURCE: NORMAL

## 2023-08-29 ENCOUNTER — OFFICE VISIT (OUTPATIENT)
Dept: OPHTHALMOLOGY | Facility: MEDICAL CENTER | Age: 33
End: 2023-08-29
Payer: MEDICARE

## 2023-08-29 DIAGNOSIS — H52.13 MYOPIA OF BOTH EYES: ICD-10-CM

## 2023-08-29 DIAGNOSIS — H46.9 OPTIC NEURITIS: ICD-10-CM

## 2023-08-29 DIAGNOSIS — G93.2 IIH (IDIOPATHIC INTRACRANIAL HYPERTENSION): ICD-10-CM

## 2023-08-29 DIAGNOSIS — Q79.60 EHLERS-DANLOS SYNDROME: ICD-10-CM

## 2023-08-29 PROCEDURE — 99205 OFFICE O/P NEW HI 60 MIN: CPT | Mod: 25 | Performed by: OPHTHALMOLOGY

## 2023-08-29 PROCEDURE — 92250 FUNDUS PHOTOGRAPHY W/I&R: CPT | Performed by: OPHTHALMOLOGY

## 2023-08-29 RX ORDER — TOPIRAMATE 25 MG/1
25 TABLET ORAL 2 TIMES DAILY
COMMUNITY
Start: 2023-08-28 | End: 2024-01-16

## 2023-08-29 RX ORDER — PREDNISONE 50 MG/1
TABLET ORAL
COMMUNITY
End: 2023-11-15

## 2023-08-29 ASSESSMENT — EXTERNAL EXAM - LEFT EYE: OS_EXAM: NORMAL

## 2023-08-29 ASSESSMENT — CONF VISUAL FIELD
OD_INFERIOR_TEMPORAL_RESTRICTION: 0
OS_INFERIOR_NASAL_RESTRICTION: 0
OS_SUPERIOR_TEMPORAL_RESTRICTION: 0
OD_SUPERIOR_NASAL_RESTRICTION: 0
OD_INFERIOR_NASAL_RESTRICTION: 0
OD_SUPERIOR_TEMPORAL_RESTRICTION: 0
OS_INFERIOR_TEMPORAL_RESTRICTION: 0
OS_SUPERIOR_NASAL_RESTRICTION: 0
OS_NORMAL: 1
OD_NORMAL: 1

## 2023-08-29 ASSESSMENT — VISUAL ACUITY
OS_CC: J1
CORRECTION_TYPE: GLASSES
OS_CC: 20/20
METHOD: SNELLEN - LINEAR
OD_CC: 20/20
OD_CC: J1

## 2023-08-29 ASSESSMENT — REFRACTION_WEARINGRX
OS_CYLINDER: +1.50
SPECS_TYPE: SVL
OD_SPHERE: -2.00
OS_AXIS: 080
OD_CYLINDER: +2.00
OD_AXIS: 123
OS_SPHERE: -0.75

## 2023-08-29 ASSESSMENT — REFRACTION_MANIFEST
METHOD_AUTOREFRACTION: 1
OD_CYLINDER: +2.50
OD_AXIS: 108
OS_AXIS: 082
OD_SPHERE: -2.25
OS_CYLINDER: +2.50
OS_SPHERE: -1.50

## 2023-08-29 ASSESSMENT — SLIT LAMP EXAM - LIDS
COMMENTS: NORMAL
COMMENTS: NORMAL

## 2023-08-29 ASSESSMENT — TONOMETRY
OD_IOP_MMHG: 25
IOP_METHOD: I-CARE
OS_IOP_MMHG: 24

## 2023-08-29 ASSESSMENT — EXTERNAL EXAM - RIGHT EYE: OD_EXAM: NORMAL

## 2023-08-30 NOTE — ASSESSMENT & PLAN NOTE
8/29/2023-recent admission for bilateral decreased vision associated with ocular pain on eye movements.  But diagnosed with possible optic neuritis.  Had complete work-up including MRI and MRV that were negative.  Because of a past history of possible IIH a lumbar puncture was performed.  Opening pressure was normal.  1 white blood cell.  NMO, MOG, and AI were negative.  She was treated with 3 days of intravenous Solu-Medrol and is currently on an oral steroid taper.  She states that her visual acuity is returned to normal.  She believes this was related to recent institution of Humira a month ago.  However on examination today there is no evidence of any underlying optic nerve disorder.  Her OCT neurofibrillary thickness is 95 OD 92 OS with 0.4 cup sharp discs no swelling or pallor.  Therefore I am uncertain as to the etiology of her vision loss.  We will monitor on a steroid taper.

## 2023-08-30 NOTE — ASSESSMENT & PLAN NOTE
8/26/2023-prior patient of Dr. Yousif.  Carries a diagnosis of possible IAH but was not taking Diamox.  On examination today there is no evidence of papilledema.  She underwent an LP in the hospital that was normal opening pressure at 14

## 2023-08-30 NOTE — ASSESSMENT & PLAN NOTE
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  I do not detect any overt lenticular dislocation.  There is no apparent retinal thinning.

## 2023-09-01 ENCOUNTER — OFFICE VISIT (OUTPATIENT)
Dept: URGENT CARE | Facility: CLINIC | Age: 33
End: 2023-09-01
Payer: MEDICARE

## 2023-09-01 VITALS
DIASTOLIC BLOOD PRESSURE: 72 MMHG | WEIGHT: 246 LBS | TEMPERATURE: 96.6 F | HEIGHT: 64 IN | SYSTOLIC BLOOD PRESSURE: 118 MMHG | OXYGEN SATURATION: 95 % | RESPIRATION RATE: 16 BRPM | HEART RATE: 65 BPM | BODY MASS INDEX: 42 KG/M2

## 2023-09-01 DIAGNOSIS — J06.9 VIRAL URI WITH COUGH: Primary | ICD-10-CM

## 2023-09-01 DIAGNOSIS — J45.20 MILD INTERMITTENT ASTHMA WITHOUT COMPLICATION: ICD-10-CM

## 2023-09-01 DIAGNOSIS — Z79.899 POLYPHARMACY: ICD-10-CM

## 2023-09-01 DIAGNOSIS — Z79.899 HIGH RISK MEDICATION USE: ICD-10-CM

## 2023-09-01 LAB
FLUAV RNA SPEC QL NAA+PROBE: NEGATIVE
FLUBV RNA SPEC QL NAA+PROBE: NEGATIVE
RSV RNA SPEC QL NAA+PROBE: NEGATIVE
SARS-COV-2 RNA RESP QL NAA+PROBE: NEGATIVE

## 2023-09-01 PROCEDURE — 99214 OFFICE O/P EST MOD 30 MIN: CPT | Performed by: FAMILY MEDICINE

## 2023-09-01 PROCEDURE — 3074F SYST BP LT 130 MM HG: CPT | Performed by: FAMILY MEDICINE

## 2023-09-01 PROCEDURE — 0241U POCT CEPHEID COV-2, FLU A/B, RSV - PCR: CPT | Performed by: FAMILY MEDICINE

## 2023-09-01 PROCEDURE — 3078F DIAST BP <80 MM HG: CPT | Performed by: FAMILY MEDICINE

## 2023-09-01 RX ORDER — BENZONATATE 200 MG/1
200 CAPSULE ORAL 3 TIMES DAILY PRN
Qty: 30 CAPSULE | Refills: 0 | Status: SHIPPED | OUTPATIENT
Start: 2023-09-01 | End: 2024-01-16

## 2023-09-01 RX ORDER — FLUTICASONE PROPIONATE 50 MCG
2 SPRAY, SUSPENSION (ML) NASAL
Qty: 16 G | Refills: 1 | Status: SHIPPED | OUTPATIENT
Start: 2023-09-01 | End: 2024-01-16

## 2023-09-01 ASSESSMENT — ENCOUNTER SYMPTOMS
FEVER: 1
DIZZINESS: 1
SHORTNESS OF BREATH: 1
COUGH: 1
MYALGIAS: 1

## 2023-09-01 ASSESSMENT — FIBROSIS 4 INDEX: FIB4 SCORE: 0.76

## 2023-09-01 NOTE — PROGRESS NOTES
"Subjective     Kristin Balderrama is a 32 y.o. female who presents with Shortness of Breath (X 1 days, SOB, dizziness)      - This is a very pleasant 32 y.o. who has come to the walk-in clinic today for yesterday started to feel dizzy (described as foggy headed and like coming down with something), mild aches/malaise, a little bit of stuffy/runny nose and slight cough and chest congestion and wheeze. No associated nv, no cp/sob. Felt warm this morning like maybe a fever.     Hx optic neuritis, transverse myelitis,  on immuno modulators, sees neuro and ophtho            ALLERGIES:  Cefdinir, Depakote [divalproex sodium], Doxycycline, Montelukast [singulair], Vancomycin, Wound dressing adhesive, Amitriptyline, Aripiprazole, Aripiprazole [abilify], Clindamycin, Flomax [tamsulosin hydrochloride], Levaquin, Metformin, Tamsulosin, Tape, Amoxicillin, Depakote [divalproex sodium], Erythromycin, Hydromorphone, Montelukast, Ampicillin, Ciprofloxacin, Keflex, Levofloxacin, Metronidazole, Penicillins, Sulfa drugs, and Valproic acid     PMH:  Past Medical History:   Diagnosis Date    Abdominal pain     Anginal syndrome     random chest pain especially with tachycardia    Apnea, sleep     Arrhythmia     \"sinus tachycardia\", cariologist, Dr. Kumar; ablation 2/2016    Arthritis     osteo    ASTHMA     when around smoke    Back pain     Borderline personality disorder (HCC)     Breath shortness     with tachycardia    Bronchitis 02/08/2022    Last time was 12/21    Cardiac arrhythmia     Chickenpox     Chronic UTI 09/18/2010    Cough     Daytime sleepiness     Dental disorder 03/08/2021    infected tooth    Depression     Diabetes (HCC)     Diarrhea     incontinece    Disorder of thyroid     Hashimoto's    Fall     Fatigue     Frequent headaches     Gasping for breath     Gynecological disorder     PCOS    Headache(784.0)     Heart burn     Heart murmur     History of falling     Indigestion     Migraine     Mitochondrial disease " "(HCC)     Multiple personality disorder (HCC)     Nausea     Obesity     Other fatigue 06/29/2020    Pain 08/15/2012    back, 7/10    Painful joint     PCOS (polycystic ovarian syndrome)     Pneumonia 2012 12/2020    POTS (postural orthostatic tachycardia syndrome)     Psychosis (HCC)     Ringing in ears     Scoliosis     Shortness of breath     O2 as needed    Sinus tachycardia 10/31/2013    Sleep apnea     CPAP \"pulmonary doctor took me off mid year 2016\"    Snoring     Supraventricular tachycardia (HCC) 4/10/2019    Tonsillitis     Transverse myelitis (HCC)     2/8/22: Per pt: not anymore    Tuberculosis     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    Urinary bladder disorder     Suprapubic cath. 2/8/22: Not anymore.     Urinary incontinence     Weakness     Wears glasses         PSH:  Past Surgical History:   Procedure Laterality Date    INGUINAL HERNIA LAPAROSCOPIC Right 07/21/2023    Procedure: LAPAROSCOPIC RIGHT INGUINAL HERNIA REPAIR WITH MESH;  Surgeon: Joe Noyola M.D.;  Location: [facility];  Service: General    HERNIA REPAIR Right 07/21/2023    PB PERCUT FIX PBOX/NECK FEMUR FX Left 01/28/2023    Procedure: FIXATION, HIP, USING CANNULATED SCREW;  Surgeon: Noman Abdul M.D.;  Location: [facility];  Service: Orthopedics    NC LAP,DIAGNOSTIC ABDOMEN  02/14/2022    Procedure: LAPAROSCOPY;  Surgeon: Seamus Pisano M.D.;  Location: SURGERY SAME DAY [facility];  Service: Gynecology    OVARIAN CYSTECTOMY Right 02/14/2022    Procedure: EXCISION, CYST, OVARY;  Surgeon: Seamus Pisano M.D.;  Location: SURGERY SAME DAY [facility];  Service: Gynecology    BOWEL STIMULATOR INSERTION  03/10/2021    Procedure: INSERTION, ELECTRODE LEADS AND PULSE GENERATOR, NEUROSTIMULATOR, SACRAL - REMOVAL OF INTERSTIM WITH REPLACEMENT OF SACRAL NEUROMODULATION DEVICE;  Surgeon: Joe Noyola M.D.;  Location: [facility];  Service: General    MUSCLE BIOPSY Right 01/26/2017    Procedure: MUSCLE BIOPSY " - THIGH;  Surgeon: Isidro Vigil M.D.;  Location: SURGERY [facility];  Service:     GASTROSCOPY WITH BALLOON DILATATION N/A 01/04/2017    Procedure: GASTROSCOPY WITH DILATATION;  Surgeon: Torres Vargas M.D.;  Location: SURGERY [facility];  Service:     BOWEL STIMULATOR INSERTION  07/13/2016    Procedure: BOWEL STIMULATOR INSERTION FOR PERMANENT INTERSTIM SACRAL IMPLANT;  Surgeon: Joe Noyola M.D.;  Location: SURGERY [facility];  Service:     RECOVERY  01/27/2016    Procedure: CATH LAB EP STUDY WITH SINUS NODE MODIFICATION LA;  Surgeon: [facility];  Location: SURGERY PRE-POST PROC UNIT [facility];  Service:     OTHER CARDIAC SURGERY  01/2016    cardiac ablation    NEURO DEST FACET L/S W/IG SNGL  04/21/2015    Performed by Reza Tabor at SURGERY [facility]    LUMBAR FUSION ANTERIOR  08/21/2012    Performed by SUSIE SAWANT at SURGERY [facility]S    ALYSSA BY LAPAROSCOPY  08/29/2010    Performed by SHAYY JOHNS at SURGERY [facility]S    LAMINOTOMY      OTHER ABDOMINAL SURGERY      TONSILLECTOMY      tonsillectomy       MEDS:    Current Outpatient Medications:     benzonatate (TESSALON) 200 MG capsule, Take 1 Capsule by mouth 3 times a day as needed for Cough., Disp: 30 Capsule, Rfl: 0    fluticasone (FLONASE) 50 MCG/ACT nasal spray, Administer 2 Sprays into affected nostril(S) at bedtime., Disp: 16 g, Rfl: 1    predniSONE (DELTASONE) 50 MG Tab, predniSONE, Disp: , Rfl:     topiramate (TOPAMAX) 25 MG Tab, Take 25 Tablets by mouth 2 times a day., Disp: , Rfl:     omeprazole (PRILOSEC) 20 MG delayed-release capsule, Take 1 Capsule by mouth every day for 21 days., Disp: 21 Capsule, Rfl: 0    predniSONE (DELTASONE) 50 MG Tab, Take 2 Tablets by mouth every day for 15 days., Disp: 30 Tablet, Rfl: 0    fluconazole (DIFLUCAN) 150 MG tablet, Take 150 mg by mouth every Wednesday., Disp: , Rfl:     topiramate (TOPAMAX) 25 MG Tab, Take 25-50 mg by mouth 2 times a day. 1 tablet (25 mg)  in the morning 2 tablets (50 mg) at bedtime, Disp: , Rfl:     metoprolol SR (TOPROL XL) 25 MG TABLET SR 24 HR, Take 1 Tablet by mouth every day., Disp: 90 Tablet, Rfl: 3    docusate sodium (STOOL SOFTENER) 250 MG capsule, Take 250 mg by mouth 2 times a day., Disp: , Rfl:     albuterol (PROVENTIL) 2.5mg/0.5ml Nebu Soln, Take 2.5 mg by nebulization every four hours as needed for Shortness of Breath., Disp: , Rfl:     Adalimumab 80 MG/0.8ML Pen-injector Kit, Inject 80 mg under the skin every 14 days., Disp: , Rfl:     naproxen (NAPROSYN) 500 MG Tab, Take 1 Tablet by mouth 2 times a day with meals., Disp: 20 Tablet, Rfl: 0    albuterol 108 (90 Base) MCG/ACT Aero Soln inhalation aerosol, Inhale 2 Puffs every 6 hours as needed for Shortness of Breath., Disp: 8.5 g, Rfl: 0    tizanidine (ZANAFLEX) 4 MG Tab, Take 4 mg by mouth 3 times a day., Disp: , Rfl:     sodium chloride (SALT) 1 GM Tab, Take 1 Tablet by mouth 3 times a day with meals., Disp: 90 Tablet, Rfl: 2    ivabradine (CORLANOR) 7.5 MG Tab tablet, Take 1 Tablet by mouth 2 times a day with meals., Disp: 60 Tablet, Rfl: 2    ziprasidone (GEODON) 40 MG Cap, Take 1 capsule by mouth at bedtime., Disp: 30 Capsule, Rfl: 2    diphenhydrAMINE (BENADRYL) 50 MG tablet, Take 50 mg by mouth at bedtime., Disp: , Rfl:     Melatonin 10 MG Tab, Take 10 mg by mouth at bedtime., Disp: , Rfl:     etonogestrel (NEXPLANON) 68 MG Implant implant, Inject 1 Each under the skin see administration instructions. Pt reports she is not sure when she had this medications injected last, pt reports she still has it in her arm Every 3 years to change, Disp: , Rfl:     ** I have documented what I find to be significant in regards to past medical, social, family and surgical history  in my HPI or under PMH/PSH/FH review section, otherwise it is noncontributory **         HPI    Review of Systems   Constitutional:  Positive for fever and malaise/fatigue.   HENT:  Positive for congestion.   "  Respiratory:  Positive for cough (mild) and shortness of breath.    Musculoskeletal:  Positive for myalgias.   Neurological:  Positive for dizziness.   All other systems reviewed and are negative.             Objective     /72   Pulse 65   Temp 35.9 °C (96.6 °F) (Temporal)   Resp 16   Ht 1.626 m (5' 4\")   Wt 112 kg (246 lb)   SpO2 95%   BMI 42.23 kg/m²      Physical Exam  Vitals and nursing note reviewed.   Constitutional:       General: She is not in acute distress.     Appearance: Normal appearance. She is well-developed.   HENT:      Head: Normocephalic.      Mouth/Throat:      Mouth: Mucous membranes are moist.      Pharynx: Oropharynx is clear. No oropharyngeal exudate or posterior oropharyngeal erythema.   Cardiovascular:      Heart sounds: Normal heart sounds. No murmur heard.  Pulmonary:      Effort: Pulmonary effort is normal. No respiratory distress.      Breath sounds: Wheezing (faint occasional exp wheeze) present. No rhonchi or rales.   Neurological:      Mental Status: She is alert.      Motor: No abnormal muscle tone.   Psychiatric:         Mood and Affect: Mood normal.         Behavior: Behavior normal.                             Assessment & Plan     1. Viral URI with cough  benzonatate (TESSALON) 200 MG capsule    fluticasone (FLONASE) 50 MCG/ACT nasal spray    POCT CoV-2, Flu A/B, RSV by PCR      2. Mild intermittent asthma without complication        3. Polypharmacy        4. High risk medication use            - Dx, plan & d/c instructions discussed   - Rest, stay hydrated  - OTC Tylenol as needed  - E.R. precautions discussed     Follow up with your regular primary care providers office in a week for a recheck on today's visit. ER if not improving in 2-3 days or if feeling/getting worse.     Any realistic side effects of medications that may have been given today reviewed.     Patient left in stable condition     POCT results reviewed/discussed    Pertinent prior lab work and/or " imaging studies in Epic have been reviewed by me today on day of this visit.      Pertinent prior office visit notes in [facility] have been reviewed by me today on day of this visit.

## 2023-09-03 ENCOUNTER — HOSPITAL ENCOUNTER (EMERGENCY)
Facility: MEDICAL CENTER | Age: 33
End: 2023-09-03
Attending: EMERGENCY MEDICINE
Payer: MEDICARE

## 2023-09-03 VITALS
WEIGHT: 249.78 LBS | SYSTOLIC BLOOD PRESSURE: 136 MMHG | BODY MASS INDEX: 42.64 KG/M2 | RESPIRATION RATE: 20 BRPM | HEART RATE: 57 BPM | DIASTOLIC BLOOD PRESSURE: 106 MMHG | HEIGHT: 64 IN | TEMPERATURE: 98.2 F | OXYGEN SATURATION: 97 %

## 2023-09-03 DIAGNOSIS — S05.00XA CORNEAL ABRASION, UNSPECIFIED LATERALITY, INITIAL ENCOUNTER: ICD-10-CM

## 2023-09-03 DIAGNOSIS — H46.9 OPTIC NEURITIS: ICD-10-CM

## 2023-09-03 DIAGNOSIS — Z87.81 S/P LEFT HIP FRACTURE: ICD-10-CM

## 2023-09-03 PROCEDURE — 700101 HCHG RX REV CODE 250: Mod: UD | Performed by: EMERGENCY MEDICINE

## 2023-09-03 PROCEDURE — 99284 EMERGENCY DEPT VISIT MOD MDM: CPT

## 2023-09-03 PROCEDURE — A9270 NON-COVERED ITEM OR SERVICE: HCPCS | Mod: UD | Performed by: EMERGENCY MEDICINE

## 2023-09-03 PROCEDURE — 700102 HCHG RX REV CODE 250 W/ 637 OVERRIDE(OP): Mod: UD | Performed by: EMERGENCY MEDICINE

## 2023-09-03 RX ORDER — LOTEPREDNOL ETABONATE 2 MG/ML
1 SUSPENSION/ DROPS OPHTHALMIC 4 TIMES DAILY
Qty: 10 ML | Refills: 0 | Status: SHIPPED | OUTPATIENT
Start: 2023-09-03 | End: 2023-09-03

## 2023-09-03 RX ORDER — POLYMYXIN B SULFATE AND TRIMETHOPRIM 1; 10000 MG/ML; [USP'U]/ML
1 SOLUTION OPHTHALMIC 4 TIMES DAILY
Qty: 10 ML | Refills: 0 | Status: ACTIVE | OUTPATIENT
Start: 2023-09-03 | End: 2023-09-08

## 2023-09-03 RX ORDER — LOTEPREDNOL ETABONATE 2 MG/ML
1 SUSPENSION/ DROPS OPHTHALMIC 4 TIMES DAILY
Qty: 10 ML | Refills: 0 | Status: SHIPPED | OUTPATIENT
Start: 2023-09-03 | End: 2023-09-03 | Stop reason: SDUPTHER

## 2023-09-03 RX ORDER — TETRACAINE HYDROCHLORIDE 5 MG/ML
1 SOLUTION OPHTHALMIC ONCE
Status: DISCONTINUED | OUTPATIENT
Start: 2023-09-03 | End: 2023-09-03

## 2023-09-03 RX ORDER — PROPARACAINE HYDROCHLORIDE 5 MG/ML
1 SOLUTION/ DROPS OPHTHALMIC ONCE
Status: COMPLETED | OUTPATIENT
Start: 2023-09-03 | End: 2023-09-03

## 2023-09-03 RX ORDER — PREDNISOLONE ACETATE 10 MG/ML
1 SUSPENSION/ DROPS OPHTHALMIC 4 TIMES DAILY
Qty: 5 ML | Refills: 0 | Status: SHIPPED | OUTPATIENT
Start: 2023-09-03 | End: 2023-09-13

## 2023-09-03 RX ORDER — PREDNISOLONE SODIUM PHOSPHATE 10 MG/ML
1 SOLUTION/ DROPS OPHTHALMIC ONCE
Status: COMPLETED | OUTPATIENT
Start: 2023-09-03 | End: 2023-09-03

## 2023-09-03 RX ADMIN — PROPARACAINE HYDROCHLORIDE 1 DROP: 5 SOLUTION/ DROPS OPHTHALMIC at 16:43

## 2023-09-03 RX ADMIN — PREDNISOLONE SODIUM PHOSPHATE 1 DROP: 10 SOLUTION/ DROPS OPHTHALMIC at 18:33

## 2023-09-03 RX ADMIN — FLUORESCEIN SODIUM 1 MG: 1 STRIP OPHTHALMIC at 16:42

## 2023-09-03 ASSESSMENT — FIBROSIS 4 INDEX: FIB4 SCORE: 0.76

## 2023-09-03 NOTE — ED PROVIDER NOTES
"  CHIEF COMPLAINT  Chief Complaint   Patient presents with    Eye Pain     C/o pain in left x 1 day. Denies trauma. reports clear drainage weeping from left eye d/t \"light sensitivity\" arrived w/eye patch in place.      Visual acuity  R 20/25  L20/50  B 20/50       LIMITATION TO HISTORY   Select: None.    \[facility]\""    Kristin Balderrama is a 32 y.o. female with a recent history of admission and high-dose steroids for presumed optic neuritis of unknown etiology comes in today with symptoms similar to her typical flare.  Duration of been 1 day.  It is described as her left eye.  Described as tearing.  Also associated light sensitivity.  Also when she moves her eye.  Better when she wears a patch.  There is no associated weakness or numbness in any extremity.  There is no associated fever or chills    Patient was in the hospital received Solu-Medrol 1 g daily for 3 days.  They went on a steroid taper last night he was on 100 mg and just as her last dose today at 10 mg.  She states is very some of the same symptoms.  No eye trauma.    OUTSIDE HISTORIAN(S):  Select: None.    EXTERNAL RECORDS REVIEWED  Select: Other     9.1.2023   Clinic visit  Assessment & Plan   1. Viral URI with cough  benzonatate (TESSALON) 200 MG capsule     fluticasone (FLONASE) 50 MCG/ACT nasal spray     POCT CoV-2, Flu A/B, RSV by PCR       2. Mild intermittent asthma without complication          3. Polypharmacy          4. High risk medication use                - Dx, plan & d/c instructions discussed   - Rest, stay hydrated  - OTC Tylenol as needed  - E.R. precautions discussed     8.31.2023  Eye doctor appt  Pertinent Lab/Test/Imaging Review:        Assessment and Plan:     Optic neuritis  8/29/2023-recent admission for bilateral decreased vision associated with ocular pain on eye movements.  But diagnosed with possible optic neuritis.  Had complete work-up including MRI and MRV that were negative.  Because of a past history of possible IIH a " lumbar puncture was performed.  Opening pressure was normal.  1 white blood cell.  NMO, MOG, and AI were negative.  She was treated with 3 days of intravenous Solu-Medrol and is currently on an oral steroid taper.  She states that her visual acuity is returned to normal.  She believes this was related to recent institution of Humira a month ago.  However on examination today there is no evidence of any underlying optic nerve disorder.  Her OCT neurofibrillary thickness is 95 OD 92 OS with 0.4 cup sharp discs no swelling or pallor.  Therefore I am uncertain as to the etiology of her vision loss.  We will monitor on a steroid taper.     IIH (idiopathic intracranial hypertension)  8/26/2023-prior patient of Dr. Yousif.  Carries a diagnosis of possible IAH but was not taking Diamox.  On examination today there is no evidence of papilledema.  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  I do not detect any overt lenticular dislocation.  There is no apparent retinal thinning.     Greater than 60 minutes total time spent.  This included extensive review of admission records at [facility], reviewing laboratories at [facility], reviewing MRI images, counseling patient on findings, formulating note in epic.     John Franks M.D.       CHIEF COMPLAINT ON ADMISSION       Chief Complaint   Patient presents with    Sent by MD       Patient was seen by jose surgery for worst pain and left eye getting worst. Patient was seen 8/16  and said was dx  with Optic neuritis Patient told to come to ER for IV steroids per jose surgery.          Reason for Admission  Sent by MD      Admission Date  8/18/2023     CODE STATUS  Full Code     HPI & HOSPITAL COURSE     Kristin Balderrama is a 32 y.o. female with past medical history of idiopathic intracranial  hypertension, optic neuritis, asthma, and bipolar/schizophrenia who presented 8/18/2023 with left eye pain.  Patient reports that her eye pain started on 8/16 in the morning.  She had clear drainage, denied any trauma.  She reports this feels similar to her previous episode of optic neuritis for which she used to see Dr. Yousif neuro-ophthalmology however he has since retired and she has been unable to get follow-up. She was seen in the ER on 8/16 for her symptoms, she had 20/20 vision on that evaluation and despite recommending to stay inpatient for IV steroids patient decided to discharge home.  She was recommended to follow-up with outpatient ophthalmology and given steroids.  Patient reports she went to her outpatient neurosurgery appointment  on 8/18 and they recommended she return to the ER to be admitted as her symptoms are worsening.  Patient denies any fevers, chills, chest pain or shortness of breath.     visual exam was done in ER,  patient now seeing 20/70 in both eyes.  Pressure was 20 in the left eye and 14 in the right eye.  ERP discussed with ophthalmology Dr. Barnes and neuro-ophthalmology.    Dr. Barnes did come and evaluate the patient, vision tested 20/70, no optic disc edema with dilation was noted, neuro-opth was recommended. They were called on admission and recommended MRI of the orbits, MRV, IR lumbar puncture for therapeutic tap.  Also requested anti-Mogg and anti aquaporin antibodies. Recommended 3-day course of IV Solu-Medrol 1 g.MRI of orbits and MRV were unremarkable for evidence of papilledema or acute optic neuritis and no evidence of dural venous thrombosis. Lumbar puncture was done and had normal opening pressure, 14cc was removed. She was started on 1g IV steroids x 3 days. She did have some improvement of her symptoms. I did speak with neuro-optho about the above findings and they recommend follow up with their office this week and a 1mg/kg oral steroid. Given pt obesity she was  "given 100 mg oral prednisone and instruction to follow up with neuro ophthalmology.   At time of discharge she is hemodynamically stable and her presenting symptoms are improved.      Therefore, she is discharged in fair and stable condition to home with close outpatient follow-up.     The patient met 2-midnight criteria for an inpatient stay at the time of discharge.     Discharge Date  8/21/2023    REVIEW OF SYSTEMS      PAST MEDICAL HISTORY  Past Medical History:   Diagnosis Date    Abdominal pain     Anginal syndrome     random chest pain especially with tachycardia    Apnea, sleep     Arrhythmia     \"sinus tachycardia\", cariologist, Dr. Kumar; ablation 2/2016    Arthritis     osteo    ASTHMA     when around smoke    Back pain     Borderline personality disorder (HCC)     Breath shortness     with tachycardia    Bronchitis 02/08/2022    Last time was 12/21    Cardiac arrhythmia     Chickenpox     Chronic UTI 09/18/2010    Cough     Daytime sleepiness     Dental disorder 03/08/2021    infected tooth    Depression     Diabetes (HCC)     Diarrhea     incontinece    Disorder of thyroid     Hashimoto's    Fall     Fatigue     Frequent headaches     Gasping for breath     Gynecological disorder     PCOS    Headache(784.0)     Heart burn     Heart murmur     History of falling     Indigestion     Migraine     Mitochondrial disease (HCC)     Multiple personality disorder (HCC)     Nausea     Obesity     Other fatigue 06/29/2020    Pain 08/15/2012    back, 7/10    Painful joint     PCOS (polycystic ovarian syndrome)     Pneumonia 2012 12/2020    POTS (postural orthostatic tachycardia syndrome)     Psychosis (HCC)     Ringing in ears     Scoliosis     Shortness of breath     O2 as needed    Sinus tachycardia 10/31/2013    Sleep apnea     CPAP \"pulmonary doctor took me off mid year 2016\"    Snoring     Supraventricular tachycardia (HCC) 4/10/2019    Tonsillitis     Transverse myelitis (HCC)     2/8/22: Per pt: not anymore "    Tuberculosis     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    Urinary bladder disorder     Suprapubic cath. 2/8/22: Not anymore.     Urinary incontinence     Weakness     Wears glasses        FAMILY HISTORY  Family History   Problem Relation Age of Onset    Hypertension Mother     Sleep Apnea Mother     Heart Disease Mother     Other Mother         hypothryod    Hypertension Maternal Uncle     Heart Disease Maternal Grandmother     Hypertension Maternal Grandmother     No Known Problems Sister     Other Sister         Narcolepsy;fibromyalsia;bone;nerve    Genitourinary () Problems Sister         endometriosis       SOCIAL HISTORY  Social History     Tobacco Use    Smoking status: Never    Smokeless tobacco: Never   Vaping Use    Vaping Use: Never used   Substance Use Topics    Alcohol use: No     Alcohol/week: 0.0 oz    Drug use: Not Currently     Frequency: 7.0 times per week     Types: Marijuana, Oral     Social History     Substance and Sexual Activity   Drug Use Not Currently    Frequency: 7.0 times per week    Types: Marijuana, Oral       SURGICAL HISTORY  Past Surgical History:   Procedure Laterality Date    INGUINAL HERNIA LAPAROSCOPIC Right 07/21/2023    Procedure: LAPAROSCOPIC RIGHT INGUINAL HERNIA REPAIR WITH MESH;  Surgeon: Joe Noyola M.D.;  Location: SURGERY [facility];  Service: General    HERNIA REPAIR Right 07/21/2023    PB PERCUT FIX PBOX/NECK FEMUR FX Left 01/28/2023    Procedure: FIXATION, HIP, USING CANNULATED SCREW;  Surgeon: Noman Abdul M.D.;  Location: SURGERY [facility];  Service: Orthopedics    LA LAP,DIAGNOSTIC ABDOMEN  02/14/2022    Procedure: LAPAROSCOPY;  Surgeon: Seamus Pisano M.D.;  Location: SURGERY SAME DAY [facility];  Service: Gynecology    OVARIAN CYSTECTOMY Right 02/14/2022    Procedure: EXCISION, CYST, OVARY;  Surgeon: Seamus Pisano M.D.;  Location: SURGERY SAME DAY [facility];  Service: Gynecology    BOWEL STIMULATOR INSERTION  03/10/2021    Procedure:  INSERTION, ELECTRODE LEADS AND PULSE GENERATOR, NEUROSTIMULATOR, SACRAL - REMOVAL OF INTERSTIM WITH REPLACEMENT OF SACRAL NEUROMODULATION DEVICE;  Surgeon: Joe Noyola M.D.;  Location: SURGERY [facility];  Service: General    MUSCLE BIOPSY Right 01/26/2017    Procedure: MUSCLE BIOPSY - THIGH;  Surgeon: Isidro Vigil M.D.;  Location: SURGERY [facility];  Service:     GASTROSCOPY WITH BALLOON DILATATION N/A 01/04/2017    Procedure: GASTROSCOPY WITH DILATATION;  Surgeon: Torres Vargas M.D.;  Location: SURGERY [facility];  Service:     BOWEL STIMULATOR INSERTION  07/13/2016    Procedure: BOWEL STIMULATOR INSERTION FOR PERMANENT INTERSTIM SACRAL IMPLANT;  Surgeon: Joe Noyola M.D.;  Location: SURGERY [facility];  Service:     RECOVERY  01/27/2016    Procedure: CATH LAB EP STUDY WITH SINUS NODE MODIFICATION LA;  Surgeon: [facility];  Location: SURGERY PRE-POST PROC UNIT [facility];  Service:     OTHER CARDIAC SURGERY  01/2016    cardiac ablation    NEURO DEST FACET L/S W/IG SNGL  04/21/2015    Performed by Reza Tabor at SURGERY [facility]    LUMBAR FUSION ANTERIOR  08/21/2012    Performed by SUSIE SAWANT at SURGERY [facility]    ALYSSA BY LAPAROSCOPY  08/29/2010    Performed by SHAYY JOHNS at SURGERY [facility]    LAMINOTOMY      OTHER ABDOMINAL SURGERY      TONSILLECTOMY      tonsillectomy       CURRENT MEDICATIONS  No current facility-administered medications for this encounter.    Current Outpatient Medications:     benzonatate (TESSALON) 200 MG capsule, Take 1 Capsule by mouth 3 times a day as needed for Cough., Disp: 30 Capsule, Rfl: 0    fluticasone (FLONASE) 50 MCG/ACT nasal spray, Administer 2 Sprays into affected nostril(S) at bedtime., Disp: 16 g, Rfl: 1    predniSONE (DELTASONE) 50 MG Tab, predniSONE, Disp: , Rfl:     topiramate (TOPAMAX) 25 MG Tab, Take 25 Tablets by mouth 2 times a day., Disp: , Rfl:     omeprazole (PRILOSEC) 20 MG delayed-release  capsule, Take 1 Capsule by mouth every day for 21 days., Disp: 21 Capsule, Rfl: 0    predniSONE (DELTASONE) 50 MG Tab, Take 2 Tablets by mouth every day for 15 days., Disp: 30 Tablet, Rfl: 0    fluconazole (DIFLUCAN) 150 MG tablet, Take 150 mg by mouth every Wednesday., Disp: , Rfl:     topiramate (TOPAMAX) 25 MG Tab, Take 25-50 mg by mouth 2 times a day. 1 tablet (25 mg) in the morning 2 tablets (50 mg) at bedtime, Disp: , Rfl:     metoprolol SR (TOPROL XL) 25 MG TABLET SR 24 HR, Take 1 Tablet by mouth every day., Disp: 90 Tablet, Rfl: 3    docusate sodium (STOOL SOFTENER) 250 MG capsule, Take 250 mg by mouth 2 times a day., Disp: , Rfl:     albuterol (PROVENTIL) 2.5mg/0.5ml Nebu Soln, Take 2.5 mg by nebulization every four hours as needed for Shortness of Breath., Disp: , Rfl:     Adalimumab 80 MG/0.8ML Pen-injector Kit, Inject 80 mg under the skin every 14 days., Disp: , Rfl:     naproxen (NAPROSYN) 500 MG Tab, Take 1 Tablet by mouth 2 times a day with meals., Disp: 20 Tablet, Rfl: 0    albuterol 108 (90 Base) MCG/ACT Aero Soln inhalation aerosol, Inhale 2 Puffs every 6 hours as needed for Shortness of Breath., Disp: 8.5 g, Rfl: 0    tizanidine (ZANAFLEX) 4 MG Tab, Take 4 mg by mouth 3 times a day., Disp: , Rfl:     sodium chloride (SALT) 1 GM Tab, Take 1 Tablet by mouth 3 times a day with meals., Disp: 90 Tablet, Rfl: 2    ivabradine (CORLANOR) 7.5 MG Tab tablet, Take 1 Tablet by mouth 2 times a day with meals., Disp: 60 Tablet, Rfl: 2    ziprasidone (GEODON) 40 MG Cap, Take 1 capsule by mouth at bedtime., Disp: 30 Capsule, Rfl: 2    diphenhydrAMINE (BENADRYL) 50 MG tablet, Take 50 mg by mouth at bedtime., Disp: , Rfl:     Melatonin 10 MG Tab, Take 10 mg by mouth at bedtime., Disp: , Rfl:     etonogestrel (NEXPLANON) 68 MG Implant implant, Inject 1 Each under the skin see administration instructions. Pt reports she is not sure when she had this medications injected last, pt reports she still has it in her arm  "Every 3 years to change, Disp: , Rfl:     ALLERGIES  Allergies   Allergen Reactions    Cefdinir Shortness of Breath and Itching     Tolerated 1/18/17  Tolerates ceftriaxone  Tolerated augmentin 8/2019     Depakote [Divalproex Sodium] Unspecified     Muscle spasms/muscle pain and weakness      Doxycycline Anaphylaxis and Vomiting     Other reaction(s): pustules/blisters  Other reaction(s): stomach pain    Montelukast [Singulair] Unspecified     Cardiac effusion    Vancomycin Itching     Pt becomes flushed in face and chest.   RXN=7/10/16    Wound Dressing Adhesive Rash     By pt report-\"removes skin totally off\"    Amitriptyline Unspecified     Headaches      Aripiprazole Unspecified    Aripiprazole [Abilify] Unspecified     Headaches/muscle twitching      Clindamycin Nausea         Other reaction(s): nausea stomach pain    Flomax [Tamsulosin Hydrochloride] Swelling    Levaquin Unspecified     Severe muscle cramps in legs  RXN=unknown  Other reaction(s): leg muscle cramps    Metformin Unspecified     Increased lactic acid     Other reaction(s): itching and rash/nausea vomiting    Tamsulosin Swelling     Swelling of legs    Tape Rash     Tears skin off  coban with Tegaderm tape ok intermittently  RXN=ongoing    Amoxicillin Rash    Depakote [Divalproex Sodium]     Erythromycin Rash     .  Other reaction(s): nausea stomach pain    Hydromorphone      Other reaction(s): vomiting    Montelukast     Ampicillin Rash     Pt reports that she received a rash     Ciprofloxacin Rash          Keflex Rash     Pt states she gets a rash with this medication  Tolerates ceftriaxone  Can take with Benadryl    Levofloxacin Unspecified     Leg muscle cramps    Metronidazole Rash     \"Vision problems\"  Other reaction(s): vision problems    Penicillins Hives     Can take with Benadryl    Sulfa Drugs Itching, Myalgia and Hives     Muscle pain and weakness  Other reaction(s): unknown    Valproic Acid Rash     .       PHYSICAL EXAM  VITAL " "SIGNS: BP (!) 154/112   Pulse 95   Temp 35.8 °C (96.5 °F) (Temporal)   Resp 16   Ht 1.626 m (5' 4\")   Wt 113 kg (249 lb 12.5 oz)   SpO2 97%   BMI 42.87 kg/m²   Reviewed and noted.  Blood pressure borderline elevated heart rate normal.  Constitutional: Well developed, Well nourished, no acute distress.  HENT: Normocephalic, atraumatic, bilateral external ears normal, equal and reactive.  A firm pupillary reflex intact.  Patient has extraocular muscle intact but with pain and subjective in the left eye.  There is no sinus tenderness or pressure.  Eyes: PERRLA, conjunctiva pink, no scleral icterus.   Cardiovascular: Regular rate and rhythm. No murmurs, rubs or gallops.  No dependent edema or calf tenderness  Respiratory: Lungs clear to auscultation bilaterally. No wheezes, rales, or rhonchi.  Abdominal:  Abdomen soft, non-tender, non distended. No rebound, or guarding.    Skin: No erythema, no rash. No wounds or bruising.  Musculoskeletal: no deformities.   Neurologic: Alert, no facial droop noted. All extra ocular muscles intact. Moves all extremities with out weakness noted  Psychiatric: Affect normal, Judgment normal, Mood normal.         MEDICAL DECISION MAKING:  PROBLEMS EVALUATED THIS VISIT:  Left eye pain.  Patient has a history of inflammation of her eye that has not been diagnosed with MRI that apparently received steroids and recently stopped the last dose today.  Difficult to ascertain if this is truly optic neuritis or some other inflammatory disorder.  Less likely this is a glaucoma.  Possible atypical migraine among others.         PLAN:  Visual acuity done  Intraocular pressure measurement  Slit-lamp examination  Consultation with neuro-ophthalmology/ophthalmologist  Patient is at  RISK:  Significant risk with optic neuritis, if this is her disease, that would require IV therapy steroids and/or admission.        RESULTS        ED COURSE:    ED Observation Status? No   No noted need for observation " for developing issue    INTERVENTIONS BY ME:  Ocular pressure left eye 17/right eye 36  Slit ly examination shows anterior chambers are clear and quiet in the left eye.  The slight brief fluorescein uptake over the eye excision mild abrasions  Visual noted.  Visual acuity  R 20/25  L20/50  B 20/50       Awaiting neuro-ophthalmology for call.  Patient did not have significant relief with proparacaine drops  At this point, after discussion with the case.  We will start patient on topical steroids with close follow-up with her ophthalmologist.    Stated that her pharmacy is closed.  At this point we do not have a pharmacy in-house.  We are trying to get to see if [facility] which is open 24 hours has this medication we can send this patient there.  At this point pending discharge.  We recommend that she follow-up with her ophthalmologist.  We made a agreement that she will come back in 2 days if not better.    FINAL DISPO PLAN   New Prescriptions    LOTEPREDNOL ETABONATE (ALREX) 0.2 % SUSPENSION    Administer 1 Drop into affected eye(s) 4 times a day.         Followup:  [facility], Emergency Dept  [address] [facility] [address] [phone]  Go in 2 days  if not better, earlier if worse.    John Franks M.D.  [address]  [facility] [address] [phone]    Call in 2 days  For follow up, he would like to see you in 7 today 10 days using the steroids      CONDITION: Able.     FINAL IMPRESSION  1. Optic neuritis    2.  Minimal corneal abrasions

## 2023-09-03 NOTE — ED TRIAGE NOTES
"Chief Complaint   Patient presents with    Eye Pain     C/o pain in left x 1 day. Denies trauma. reports clear drainage weeping from left eye d/t \"light sensitivity\" arrived w/eye patch in place.      Also reports some vision changes. NAD.  Educated on triage process. Instructed to notify staff for any worsening symptoms. Denies any recent travel. Denies exposure to known covid positive patients. Denies any respiratory symptoms.  "

## 2023-09-04 ENCOUNTER — HOSPITAL ENCOUNTER (EMERGENCY)
Facility: MEDICAL CENTER | Age: 33
End: 2023-09-04
Attending: EMERGENCY MEDICINE
Payer: MEDICARE

## 2023-09-04 VITALS
RESPIRATION RATE: 16 BRPM | BODY MASS INDEX: 42.84 KG/M2 | OXYGEN SATURATION: 97 % | DIASTOLIC BLOOD PRESSURE: 84 MMHG | SYSTOLIC BLOOD PRESSURE: 162 MMHG | HEART RATE: 75 BPM | WEIGHT: 249.56 LBS | TEMPERATURE: 98.1 F

## 2023-09-04 DIAGNOSIS — H57.13 EYE PAIN, BILATERAL: ICD-10-CM

## 2023-09-04 PROCEDURE — 99283 EMERGENCY DEPT VISIT LOW MDM: CPT

## 2023-09-04 PROCEDURE — 700101 HCHG RX REV CODE 250: Mod: UD | Performed by: EMERGENCY MEDICINE

## 2023-09-04 RX ORDER — PROPARACAINE HYDROCHLORIDE 5 MG/ML
1 SOLUTION/ DROPS OPHTHALMIC ONCE
Status: COMPLETED | OUTPATIENT
Start: 2023-09-04 | End: 2023-09-04

## 2023-09-04 RX ORDER — KETOROLAC TROMETHAMINE 5 MG/ML
1 SOLUTION OPHTHALMIC 4 TIMES DAILY
Qty: 3 ML | Refills: 0 | Status: SHIPPED | OUTPATIENT
Start: 2023-09-04 | End: 2024-01-16

## 2023-09-04 RX ADMIN — PROPARACAINE HYDROCHLORIDE 1 DROP: 5 SOLUTION/ DROPS OPHTHALMIC at 16:30

## 2023-09-04 RX ADMIN — FLUORESCEIN SODIUM 1 MG: 1 STRIP OPHTHALMIC at 16:30

## 2023-09-04 ASSESSMENT — FIBROSIS 4 INDEX: FIB4 SCORE: 0.76

## 2023-09-04 NOTE — ED PROVIDER NOTES
"  ER Provider Note    Scribed for Dayo Reynoso M.d. by Suzy Castro. 9/4/2023  3:33 PM    Primary Care Provider: Torres Brody M.D.    CHIEF COMPLAINT  Chief Complaint   Patient presents with    Eye Pain     L eye evaluated in ED yesterday. Vision/pain worse in L eye from prior visit, moving to R eye.      EXTERNAL RECORDS REVIEWED  Outpatient Notes seen 9/1/2023 at urgent care for viral URI recently hospitalized with steroids, negative MRIs for optic neuritis, MS.  Seen yesterday as well, discussed with Dr. Franks, started on steroid drops    HPI/ROS  LIMITATION TO HISTORY   Select: : None  OUTSIDE HISTORIAN(S):  None    Kristin Balderrama is a 32 y.o. female who presents to the ED complaining of worsening eye pain onset earlier today. The symptoms started in the left eye which she was seen in the ED for but the symptoms are now present in her right eye. She states that her right eye is now painful, \"seeping\" and losing vision. When the patient was seen in the ED yesterday, she was given a steroid to help with the symptom which she reports she has been taking. Denies any eye exposure to dust, or anything else significant.  She adds that she has a \"tiny cold\" with runny nose, mild cough and mild fever. She adds that COVID, and RSV tests came back negative. The patient reports that she has shortness of breath which is typical when she gets a cold.     PAST MEDICAL HISTORY  Past Medical History:   Diagnosis Date    Abdominal pain     Anginal syndrome     random chest pain especially with tachycardia    Apnea, sleep     Arrhythmia     \"sinus tachycardia\", cariologist, Dr. Kumar; ablation 2/2016    Arthritis     osteo    ASTHMA     when around smoke    Back pain     Borderline personality disorder (HCC)     Breath shortness     with tachycardia    Bronchitis 02/08/2022    Last time was 12/21    Cardiac arrhythmia     Chickenpox     Chronic UTI 09/18/2010    Cough     Daytime sleepiness     Dental disorder " "03/08/2021    infected tooth    Depression     Diabetes (HCC)     Diarrhea     incontinece    Disorder of thyroid     Hashimoto's    Fall     Fatigue     Frequent headaches     Gasping for breath     Gynecological disorder     PCOS    Headache(784.0)     Heart burn     Heart murmur     History of falling     Indigestion     Migraine     Mitochondrial disease (HCC)     Multiple personality disorder (HCC)     Nausea     Obesity     Other fatigue 06/29/2020    Pain 08/15/2012    back, 7/10    Painful joint     PCOS (polycystic ovarian syndrome)     Pneumonia 2012 12/2020    POTS (postural orthostatic tachycardia syndrome)     Psychosis (HCC)     Ringing in ears     Scoliosis     Shortness of breath     O2 as needed    Sinus tachycardia 10/31/2013    Sleep apnea     CPAP \"pulmonary doctor took me off mid year 2016\"    Snoring     Supraventricular tachycardia (HCC) 4/10/2019    Tonsillitis     Transverse myelitis (HCC)     2/8/22: Per pt: not anymore    Tuberculosis     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    Urinary bladder disorder     Suprapubic cath. 2/8/22: Not anymore.     Urinary incontinence     Weakness     Wears glasses        SURGICAL HISTORY  Past Surgical History:   Procedure Laterality Date    INGUINAL HERNIA LAPAROSCOPIC Right 07/21/2023    Procedure: LAPAROSCOPIC RIGHT INGUINAL HERNIA REPAIR WITH MESH;  Surgeon: Joe Noyola M.D.;  Location: SURGERY [facility];  Service: General    HERNIA REPAIR Right 07/21/2023    PB PERCUT FIX PBOX/NECK FEMUR FX Left 01/28/2023    Procedure: FIXATION, HIP, USING CANNULATED SCREW;  Surgeon: Noman Abdul M.D.;  Location: SURGERY [facility];  Service: Orthopedics    MI LAP,DIAGNOSTIC ABDOMEN  02/14/2022    Procedure: LAPAROSCOPY;  Surgeon: Seamus Pisano M.D.;  Location: SURGERY SAME DAY [facility];  Service: Gynecology    OVARIAN CYSTECTOMY Right 02/14/2022    Procedure: EXCISION, CYST, OVARY;  Surgeon: Seamus Pisano M.D.;  Location: SURGERY SAME DAY " [facility];  Service: Gynecology    BOWEL STIMULATOR INSERTION  03/10/2021    Procedure: INSERTION, ELECTRODE LEADS AND PULSE GENERATOR, NEUROSTIMULATOR, SACRAL - REMOVAL OF INTERSTIM WITH REPLACEMENT OF SACRAL NEUROMODULATION DEVICE;  Surgeon: Joe Noyola M.D.;  Location: SURGERY [facility];  Service: General    MUSCLE BIOPSY Right 01/26/2017    Procedure: MUSCLE BIOPSY - THIGH;  Surgeon: Isidro Vigil M.D.;  Location: SURGERY [facility];  Service:     GASTROSCOPY WITH BALLOON DILATATION N/A 01/04/2017    Procedure: GASTROSCOPY WITH DILATATION;  Surgeon: Torres Vargas M.D.;  Location: SURGERY [facility];  Service:     BOWEL STIMULATOR INSERTION  07/13/2016    Procedure: BOWEL STIMULATOR INSERTION FOR PERMANENT INTERSTIM SACRAL IMPLANT;  Surgeon: Joe Noyola M.D.;  Location: SURGERY [facility];  Service:     RECOVERY  01/27/2016    Procedure: CATH LAB EP STUDY WITH SINUS NODE MODIFICATION LA;  Surgeon: [facility];  Location: SURGERY PRE-POST PROC UNIT [facility];  Service:     OTHER CARDIAC SURGERY  01/2016    cardiac ablation    NEURO DEST FACET L/S W/IG SNGL  04/21/2015    Performed by Reza Tabor at SURGERY [facility]    LUMBAR FUSION ANTERIOR  08/21/2012    Performed by SUSIE SAWANT at SURGERY [facility]    ALYSSA BY LAPAROSCOPY  08/29/2010    Performed by SHAYY JOHNS at SURGERY [facility]    LAMINOTOMY      OTHER ABDOMINAL SURGERY      TONSILLECTOMY      tonsillectomy       FAMILY HISTORY  Family History   Problem Relation Age of Onset    Hypertension Mother     Sleep Apnea Mother     Heart Disease Mother     Other Mother         hypothryod    Hypertension Maternal Uncle     Heart Disease Maternal Grandmother     Hypertension Maternal Grandmother     No Known Problems Sister     Other Sister         Narcolepsy;fibromyalsia;bone;nerve    Genitourinary () Problems Sister         endometriosis       SOCIAL HISTORY   reports that she has never smoked. She has  never used smokeless tobacco. She reports that she does not currently use drugs after having used the following drugs: Marijuana and Oral. Frequency: 7.00 times per week. She reports that she does not drink alcohol.    CURRENT MEDICATIONS  Previous Medications    ADALIMUMAB 80 MG/0.8ML PEN-INJECTOR KIT    Inject 80 mg under the skin every 14 days.    ALBUTEROL (PROVENTIL) 2.5MG/0.5ML NEBU SOLN    Take 2.5 mg by nebulization every four hours as needed for Shortness of Breath.    ALBUTEROL 108 (90 BASE) MCG/ACT AERO SOLN INHALATION AEROSOL    Inhale 2 Puffs every 6 hours as needed for Shortness of Breath.    BENZONATATE (TESSALON) 200 MG CAPSULE    Take 1 Capsule by mouth 3 times a day as needed for Cough.    DIPHENHYDRAMINE (BENADRYL) 50 MG TABLET    Take 50 mg by mouth at bedtime.    DOCUSATE SODIUM (STOOL SOFTENER) 250 MG CAPSULE    Take 250 mg by mouth 2 times a day.    ETONOGESTREL (NEXPLANON) 68 MG IMPLANT IMPLANT    Inject 1 Each under the skin see administration instructions. Pt reports she is not sure when she had this medications injected last, pt reports she still has it in her arm  Every 3 years to change    FLUCONAZOLE (DIFLUCAN) 150 MG TABLET    Take 150 mg by mouth every Wednesday.    FLUTICASONE (FLONASE) 50 MCG/ACT NASAL SPRAY    Administer 2 Sprays into affected nostril(S) at bedtime.    IVABRADINE (CORLANOR) 7.5 MG TAB TABLET    Take 1 Tablet by mouth 2 times a day with meals.    MELATONIN 10 MG TAB    Take 10 mg by mouth at bedtime.    METOPROLOL SR (TOPROL XL) 25 MG TABLET SR 24 HR    Take 1 Tablet by mouth every day.    NAPROXEN (NAPROSYN) 500 MG TAB    Take 1 Tablet by mouth 2 times a day with meals.    OMEPRAZOLE (PRILOSEC) 20 MG DELAYED-RELEASE CAPSULE    Take 1 Capsule by mouth every day for 21 days.    POLYMIXIN-TRIMETHOPRIM (POLYTRIM) 89770-5.1 UNIT/ML-% SOLUTION    Administer 1 Drop into both eyes 4 times a day for 5 days.    PREDNISOLONE ACETATE (PRED FORTE) 1 % SUSPENSION    Administer 1  Drop into both eyes 4 times a day for 10 days.    PREDNISONE (DELTASONE) 50 MG TAB    Take 2 Tablets by mouth every day for 15 days.    PREDNISONE (DELTASONE) 50 MG TAB    predniSONE    SODIUM CHLORIDE (SALT) 1 GM TAB    Take 1 Tablet by mouth 3 times a day with meals.    TIZANIDINE (ZANAFLEX) 4 MG TAB    Take 4 mg by mouth 3 times a day.    TOPIRAMATE (TOPAMAX) 25 MG TAB    Take 25-50 mg by mouth 2 times a day. 1 tablet (25 mg) in the morning 2 tablets (50 mg) at bedtime    TOPIRAMATE (TOPAMAX) 25 MG TAB    Take 25 Tablets by mouth 2 times a day.    ZIPRASIDONE (GEODON) 40 MG CAP    Take 1 capsule by mouth at bedtime.       ALLERGIES  Cefdinir, Depakote [divalproex sodium], Doxycycline, Montelukast [singulair], Vancomycin, Wound dressing adhesive, Amitriptyline, Aripiprazole, Aripiprazole [abilify], Clindamycin, Flomax [tamsulosin hydrochloride], Levaquin, Metformin, Tamsulosin, Tape, Amoxicillin, Depakote [divalproex sodium], Erythromycin, Hydromorphone, Montelukast, Ampicillin, Ciprofloxacin, Keflex, Levofloxacin, Metronidazole, Penicillins, Sulfa drugs, and Valproic acid    PHYSICAL EXAM  BP (!) 146/84   Pulse 83   Temp 35.9 °C (96.7 °F) (Temporal)   Resp 17   Wt 113 kg (249 lb 9 oz)   SpO2 98%   BMI 42.84 kg/m²     Gen: Alert, no acute distress  HEENT: ATNC, Mild conjunctiva injection bilaterally, No cell or flare, No sidel sign, Left pressure 18 and right pressure 23.  No dendritic uptake of fluorescein.  Homogenous fluorescein layer across bilateral corneas.  Neck: trachea midline  Resp: no respiratory distress  CV: No JVD  Abd: non-distended  Ext: No deformities  Psych: normal mood  Neuro: speech fluent      COURSE & MEDICAL DECISION MAKING     ED Observation Status? Yes; I am placing the patient in to an observation status due to a diagnostic uncertainty as well as therapeutic intensity. Patient placed in observation status at 3:34 PM, 9/4/2023.     Observation plan is as follows: Monitor for symptom  management and diagnostic results     Upon Reevaluation, the patient's condition has: Improved; and will be discharged.    Patient discharged from ED Observation status at 4:42 PM (Time) 9/4/2023  (Date).     INITIAL ASSESSMENT, COURSE AND PLAN  Care Narrative: Presents now with bilateral eye pain and report of difficulty focusing.  Her slit-lamp exam is generally reassuring.  No obvious signs of anterior uveitis.  The patient had a recent work-up with negative MRIs, as well as significant steroids and was also started on steroid drops yesterday.  No evidence of anterior uveitis, conjunctivitis, glaucoma, any other acute findings.  I was unable to get a hold of Dr. Franks, but did discuss the case with the ER PA who saw the patient yesterday and recounted his discussion with Dr. Franks.  At this point, low suspicion for eye threatening pathology until she is able to follow-up with Dr. Lombardo.  We will trial topical ketorolac for her discomfort.  No evidence of orbital cellulitis.      3:34 PM - Patient seen and examined at bedside. Discussed plan of care, including an eye exam. Patient agrees to the plan of care.     3:55 PM - Patient was reevaluated at bedside. Patient states that she has had previous history of the symptoms that were treated with steroids but did not have a definitive diagnosis. She reports that it is the first episode in at least a year. Patient reports lots of clear discharge from both eyes and pain with movement. Denies any itchiness. Eye exam performed at this time. Will consult for next steps.     4:44 PM - Patient was reevaluated at bedside. Spoke with Dr. Mann (Emergency Medicine) about the patient's presentation yesterday to the ED. Informed the patient of the plan to discharge and follow up with ophthalmologist. Patient was allowed to ask questions at this time.        DISPOSITION AND DISCUSSIONS  I have discussed management of the patient with the following physicians  and ARIES's:  Dr. Mann (Emergency Medicine)    Discussion of management with other [facility] or appropriate source(s): None     Escalation of care considered, and ultimately not performed: blood analysis and diagnostic imaging.    Barriers to care at this time, including but not limited to:  None .     Considered optic antibiotics, however the patient shows no signs of bacterial conjunctivitis, corneal ulceration    The patient will return for new or worsening symptoms and is stable at the time of discharge.    The patient is referred to a primary physician for blood pressure management, diabetic screening, and for all other preventative health concerns.      DISPOSITION:  Patient will be discharged home in stable condition.    FOLLOW UP:  John Franks M.D.  [address] [facility] [address]  [place] [address] [phone]    Call in 1 day      [facility], Emergency Dept  [address] [address] [phone]    If symptoms worsen      OUTPATIENT MEDICATIONS:  New Prescriptions    KETOROLAC (ACULAR) 0.5 % SOLUTION    Administer 1 Drop into both eyes 4 times a day.      FINAL DIANGOSIS  1. Eye pain, bilateral       Suzy RODRIGUEZ (Scribe), am scribing for, and in the presence of, Dayo Reynoso M.D..    Electronically signed by: Suzy Castro (Scribe), 9/4/2023    Dayo RODRIGUEZ M.D. personally performed the services described in this documentation, as scribed by Suzy Castro in my presence, and it is both accurate and complete.      The note accurately reflects work and decisions made by me.  Dayo Reynoso M.D.  9/4/2023  11:37 PM

## 2023-09-04 NOTE — ED NOTES
Visual acuity:  R 20/100  L 20/200  Both 20/70    Pt states he visual acuity fluctuates during exam

## 2023-09-04 NOTE — DISCHARGE INSTRUCTIONS
You were seen in the emergency department for worsening of your eye pain.  You have already been treated with steroids for this.  The exact cause of your pain is not clear, however your exam was reassuring.  Please call Dr. Franks's office tomorrow morning for follow-up.    You are being prescribed medications to help treat your symptoms in the meantime.    Return for loss of vision, uncontrollable pain, any other new or concerning findings

## 2023-09-04 NOTE — ED TRIAGE NOTES
Chief Complaint   Patient presents with    Eye Pain     L eye evaluated in ED yesterday. Vision/pain worse in L eye from prior visit, moving to R eye.         BP (!) 146/84   Pulse 83   Temp 35.9 °C (96.7 °F) (Temporal)   Resp 17   Wt 113 kg (249 lb 9 oz)   SpO2 98%   BMI 42.84 kg/m²

## 2023-09-04 NOTE — ED NOTES
Pt to DC home with written and verbal education on optic neuritis and corneal abrasion. She has been provided a prescription for prednisone eye drops. Per Dr. Mann she is to take the prednisone drop instead of the polymix that was original prescribed. She has been educated to follow up with optho MD in 10 days. She is ambulatory on DC.

## 2023-09-04 NOTE — ED NOTES
Pt AOx4 and ready for education. All discharge instructions given to pt as well as Rx for Toradol eye drops. Pt verbalized understanding of all discharge instructions. All lines removed prior to discharge. All questions answered. Pt to lobby via ambulation.

## 2023-09-04 NOTE — DISCHARGE INSTRUCTIONS
Use the steroid eyedrops as indicated.  Please know we are here if its not getting better in 1 to 2 days.  We expect this to improve your symptoms and we hope that this will help us with chronic management.  Please make sure you call the ophthalmology doctor on Tuesday and make sure you get follow-up.    If not better in 24 hours or if it gets worse come back to the ER

## 2023-09-11 ENCOUNTER — OFFICE VISIT (OUTPATIENT)
Dept: OPHTHALMOLOGY | Facility: MEDICAL CENTER | Age: 33
End: 2023-09-11
Payer: MEDICARE

## 2023-09-11 DIAGNOSIS — G93.2 IIH (IDIOPATHIC INTRACRANIAL HYPERTENSION): ICD-10-CM

## 2023-09-11 DIAGNOSIS — H46.9 OPTIC NEURITIS: ICD-10-CM

## 2023-09-11 PROCEDURE — 92250 FUNDUS PHOTOGRAPHY W/I&R: CPT | Performed by: OPHTHALMOLOGY

## 2023-09-11 PROCEDURE — 99214 OFFICE O/P EST MOD 30 MIN: CPT | Mod: 25 | Performed by: OPHTHALMOLOGY

## 2023-09-11 ASSESSMENT — EXTERNAL EXAM - LEFT EYE: OS_EXAM: NORMAL

## 2023-09-11 ASSESSMENT — CONF VISUAL FIELD
OS_NORMAL: 1
OD_SUPERIOR_TEMPORAL_RESTRICTION: 0
OS_INFERIOR_TEMPORAL_RESTRICTION: 0
OS_SUPERIOR_TEMPORAL_RESTRICTION: 0
OS_SUPERIOR_NASAL_RESTRICTION: 0
OS_INFERIOR_NASAL_RESTRICTION: 0
OD_SUPERIOR_NASAL_RESTRICTION: 0
OD_INFERIOR_TEMPORAL_RESTRICTION: 0
OD_INFERIOR_NASAL_RESTRICTION: 0
OD_NORMAL: 1

## 2023-09-11 ASSESSMENT — EXTERNAL EXAM - RIGHT EYE: OD_EXAM: NORMAL

## 2023-09-11 ASSESSMENT — REFRACTION_WEARINGRX
OD_AXIS: 115
SPECS_TYPE: SVL
OS_AXIS: 76078
OD_SPHERE: -2.00
OS_CYLINDER: +1.50
OD_CYLINDER: +2.00
OS_SPHERE: -0.75

## 2023-09-11 ASSESSMENT — VISUAL ACUITY
OD_CC: 20/20
METHOD: SNELLEN - LINEAR
OS_CC: 20/25

## 2023-09-11 ASSESSMENT — SLIT LAMP EXAM - LIDS
COMMENTS: NORMAL
COMMENTS: NORMAL

## 2023-09-11 ASSESSMENT — REFRACTION_MANIFEST
OD_SPHERE: -2.50
OS_SPHERE: -1.25
OS_CYLINDER: +2.25
METHOD_AUTOREFRACTION: 1
OD_AXIS: 105
OD_CYLINDER: +2.75
OS_AXIS: 078

## 2023-09-11 ASSESSMENT — TONOMETRY
OS_IOP_MMHG: 18
IOP_METHOD: I-CARE
OD_IOP_MMHG: 17

## 2023-09-11 ASSESSMENT — CUP TO DISC RATIO
OS_RATIO: 0.1
OD_RATIO: 0.1

## 2023-09-11 NOTE — ASSESSMENT & PLAN NOTE
8/26/2023-prior patient of Dr. Yousif.  Carries a diagnosis of possible IAH but was not taking Diamox.  On examination today there is no evidence of papilledema.  She underwent an LP in the hospital that was normal opening pressure at 14  9/11/2023-no papilledema

## 2023-09-11 NOTE — ASSESSMENT & PLAN NOTE
8/29/2023-recent admission for bilateral decreased vision associated with ocular pain on eye movements.  But diagnosed with possible optic neuritis.  Had complete work-up including MRI and MRV that were negative.  Because of a past history of possible IIH a lumbar puncture was performed.  Opening pressure was normal.  1 white blood cell.  NMO, MOG, and AI were negative.  She was treated with 3 days of intravenous Solu-Medrol and is currently on an oral steroid taper.  She states that her visual acuity is returned to normal.  She believes this was related to recent institution of Humira a month ago.  However on examination today there is no evidence of any underlying optic nerve disorder.  Her OCT neurofibrillary thickness is 95 OD 92 OS with 0.4 cup sharp discs no swelling or pallor.  Therefore I am uncertain as to the etiology of her vision loss.  We will monitor on a steroid taper.  9/11/2023-2 recent emergency room visits for episodes of the ocular pain first on the left associated with blurry vision this lasted for a few hours then dissipated.  Then a second episode in the right eye.  She was given a trial of Pred forte drops with no significant improvement.  She was also given Ketalar rack which seemed to help slightly.  On examination today her central acuity of as well as color vision is normal.  She has no optic nerve swelling and her OCT neurofibrillary thickness is 91 OD 93 OS.  There is no evidence of a recurrent optic neuritis thus I suspect that her episodes of blurry vision associated with periocular pain and headaches is more either migraine or cluster.  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  She is being seen by the neurology nurse practitioner Sonal Rivera at [facility].  I would asked that she try to quantify better the type of headache condition she has and further treatments.  She may end up requiring a consultation with Ben Tenorio.

## 2023-09-11 NOTE — PROGRESS NOTES
"Peds/Neuro Ophthalmology:   John Franks M.D.    Date & Time note created:    9/11/2023   4:21 PM     Referring MD / APRN:  Torres Brody M.D., No att. providers found    Patient ID:  Name:             Kristin Balderrama   YOB: 1990  Age:                 32 y.o.  female   MRN:               [id number]    Chief Complaint/Reason for Visit:     Other (1 week f.v. optic neuritis )      History of Present Illness:    Kristin Balderrama is a 32 y.o. female   1 week f.v. optic neuritis. No headaches. Pt states that she still has sharp, stabbing pain on and off all the time. During those waves of pain her vision goes in and out of focus. Pt states that on 9/3 when she got off the steroids, her eyes were in extreme pain so she went back to the ER and got steroid eyedrops and other eye drops to help with the pain. Pt states that she also rubbed her eyes so hard that she got corneal abrasions, so she got a cream for that as well.        Review of Systems:  Review of Systems   Eyes:         Optic neuritis   Corneal abrasions    All other systems reviewed and are negative.      Past Medical History:   Past Medical History:   Diagnosis Date    Abdominal pain     Anginal syndrome     random chest pain especially with tachycardia    Apnea, sleep     Arrhythmia     \"sinus tachycardia\", cariologist, Dr. Kumar; ablation 2/2016    Arthritis     osteo    ASTHMA     when around smoke    Back pain     Borderline personality disorder (HCC)     Breath shortness     with tachycardia    Bronchitis 02/08/2022    Last time was 12/21    Cardiac arrhythmia     Chickenpox     Chronic UTI 09/18/2010    Cough     Daytime sleepiness     Dental disorder 03/08/2021    infected tooth    Depression     Diabetes (HCC)     Diarrhea     incontinece    Disorder of thyroid     Hashimoto's    Fall     Fatigue     Frequent headaches     Gasping for breath     Gynecological disorder     PCOS    Headache(784.0)     Heart burn     " "Heart murmur     History of falling     Indigestion     Migraine     Mitochondrial disease (HCC)     Multiple personality disorder (HCC)     Nausea     Obesity     Other fatigue 06/29/2020    Pain 08/15/2012    back, 7/10    Painful joint     PCOS (polycystic ovarian syndrome)     Pneumonia 2012 12/2020    POTS (postural orthostatic tachycardia syndrome)     Psychosis (HCC)     Ringing in ears     Scoliosis     Shortness of breath     O2 as needed    Sinus tachycardia 10/31/2013    Sleep apnea     CPAP \"pulmonary doctor took me off mid year 2016\"    Snoring     Supraventricular tachycardia (HCC) 4/10/2019    Tonsillitis     Transverse myelitis (HCC)     2/8/22: Per pt: not anymore    Tuberculosis     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    Urinary bladder disorder     Suprapubic cath. 2/8/22: Not anymore.     Urinary incontinence     Weakness     Wears glasses        Past Surgical History:  Past Surgical History:   Procedure Laterality Date    INGUINAL HERNIA LAPAROSCOPIC Right 07/21/2023    Procedure: LAPAROSCOPIC RIGHT INGUINAL HERNIA REPAIR WITH MESH;  Surgeon: Joe Noyola M.D.;  Location: SURGERY [facility];  Service: General    HERNIA REPAIR Right 07/21/2023    PB PERCUT FIX PBOX/NECK FEMUR FX Left 01/28/2023    Procedure: FIXATION, HIP, USING CANNULATED SCREW;  Surgeon: Noman Abdul M.D.;  Location: SURGERY [facility];  Service: Orthopedics    KY LAP,DIAGNOSTIC ABDOMEN  02/14/2022    Procedure: LAPAROSCOPY;  Surgeon: Seamus Pisano M.D.;  Location: SURGERY SAME DAY [facility];  Service: Gynecology    OVARIAN CYSTECTOMY Right 02/14/2022    Procedure: EXCISION, CYST, OVARY;  Surgeon: Seamus Pisano M.D.;  Location: SURGERY SAME DAY [facility];  Service: Gynecology    BOWEL STIMULATOR INSERTION  03/10/2021    Procedure: INSERTION, ELECTRODE LEADS AND PULSE GENERATOR, NEUROSTIMULATOR, SACRAL - REMOVAL OF INTERSTIM WITH REPLACEMENT OF SACRAL NEUROMODULATION DEVICE;  Surgeon: Joe Noyola M.D.;  " Location: SURGERY [facility];  Service: General    MUSCLE BIOPSY Right 01/26/2017    Procedure: MUSCLE BIOPSY - THIGH;  Surgeon: Isidro Vigil M.D.;  Location: SURGERY [facility];  Service:     GASTROSCOPY WITH BALLOON DILATATION N/A 01/04/2017    Procedure: GASTROSCOPY WITH DILATATION;  Surgeon: Torres Vargas M.D.;  Location: SURGERY [facility];  Service:     BOWEL STIMULATOR INSERTION  07/13/2016    Procedure: BOWEL STIMULATOR INSERTION FOR PERMANENT INTERSTIM SACRAL IMPLANT;  Surgeon: Joe Noyola M.D.;  Location: SURGERY [facility];  Service:     RECOVERY  01/27/2016    Procedure: CATH LAB EP STUDY WITH SINUS NODE MODIFICATION LA;  Surgeon: [facility];  Location: SURGERY PRE-POST PROC UNIT [facility];  Service:     OTHER CARDIAC SURGERY  01/2016    cardiac ablation    NEURO DEST FACET L/S W/IG SNGL  04/21/2015    Performed by Reza Tabor at SURGERY [facility]    LUMBAR FUSION ANTERIOR  08/21/2012    Performed by SUSIE SAWANT at SURGERY [facility] ORS    ALYSSA BY LAPAROSCOPY  08/29/2010    Performed by SHAYY JOHNS at SURGERY [facility] ORS    LAMINOTOMY      OTHER ABDOMINAL SURGERY      TONSILLECTOMY      tonsillectomy       Current Outpatient Medications:  Current Outpatient Medications   Medication Sig Dispense Refill    ketorolac (ACULAR) 0.5 % Solution Administer 1 Drop into both eyes 4 times a day. 3 mL 0    prednisoLONE acetate (PRED FORTE) 1 % Suspension Administer 1 Drop into both eyes 4 times a day for 10 days. 5 mL 0    benzonatate (TESSALON) 200 MG capsule Take 1 Capsule by mouth 3 times a day as needed for Cough. 30 Capsule 0    fluticasone (FLONASE) 50 MCG/ACT nasal spray Administer 2 Sprays into affected nostril(S) at bedtime. 16 g 1    predniSONE (DELTASONE) 50 MG Tab predniSONE      topiramate (TOPAMAX) 25 MG Tab Take 25 Tablets by mouth 2 times a day.      omeprazole (PRILOSEC) 20 MG delayed-release capsule Take 1 Capsule by mouth every day for 21 days.  21 Capsule 0    fluconazole (DIFLUCAN) 150 MG tablet Take 150 mg by mouth every Wednesday.      topiramate (TOPAMAX) 25 MG Tab Take 25-50 mg by mouth 2 times a day. 1 tablet (25 mg) in the morning 2 tablets (50 mg) at bedtime      metoprolol SR (TOPROL XL) 25 MG TABLET SR 24 HR Take 1 Tablet by mouth every day. 90 Tablet 3    docusate sodium (STOOL SOFTENER) 250 MG capsule Take 250 mg by mouth 2 times a day.      albuterol (PROVENTIL) 2.5mg/0.5ml Nebu Soln Take 2.5 mg by nebulization every four hours as needed for Shortness of Breath.      Adalimumab 80 MG/0.8ML Pen-injector Kit Inject 80 mg under the skin every 14 days.      naproxen (NAPROSYN) 500 MG Tab Take 1 Tablet by mouth 2 times a day with meals. 20 Tablet 0    albuterol 108 (90 Base) MCG/ACT Aero Soln inhalation aerosol Inhale 2 Puffs every 6 hours as needed for Shortness of Breath. 8.5 g 0    tizanidine (ZANAFLEX) 4 MG Tab Take 4 mg by mouth 3 times a day.      sodium chloride (SALT) 1 GM Tab Take 1 Tablet by mouth 3 times a day with meals. 90 Tablet 2    ivabradine (CORLANOR) 7.5 MG Tab tablet Take 1 Tablet by mouth 2 times a day with meals. 60 Tablet 2    ziprasidone (GEODON) 40 MG Cap Take 1 capsule by mouth at bedtime. 30 Capsule 2    diphenhydrAMINE (BENADRYL) 50 MG tablet Take 50 mg by mouth at bedtime.      Melatonin 10 MG Tab Take 10 mg by mouth at bedtime.      etonogestrel (NEXPLANON) 68 MG Implant implant Inject 1 Each under the skin see administration instructions. Pt reports she is not sure when she had this medications injected last, pt reports she still has it in her arm  Every 3 years to change       No current facility-administered medications for this visit.       Allergies:  Allergies   Allergen Reactions    Cefdinir Shortness of Breath and Itching     Tolerated 1/18/17  Tolerates ceftriaxone  Tolerated augmentin 8/2019     Depakote [Divalproex Sodium] Unspecified     Muscle spasms/muscle pain and weakness      Doxycycline Anaphylaxis  "and Vomiting     Other reaction(s): pustules/blisters  Other reaction(s): stomach pain    Montelukast [Singulair] Unspecified     Cardiac effusion    Vancomycin Itching     Pt becomes flushed in face and chest.   RXN=7/10/16    Wound Dressing Adhesive Rash     By pt report-\"removes skin totally off\"    Amitriptyline Unspecified     Headaches      Aripiprazole Unspecified    Aripiprazole [Abilify] Unspecified     Headaches/muscle twitching      Clindamycin Nausea         Other reaction(s): nausea stomach pain    Flomax [Tamsulosin Hydrochloride] Swelling    Levaquin Unspecified     Severe muscle cramps in legs  RXN=unknown  Other reaction(s): leg muscle cramps    Metformin Unspecified     Increased lactic acid     Other reaction(s): itching and rash/nausea vomiting    Tamsulosin Swelling     Swelling of legs    Tape Rash     Tears skin off  coban with Tegaderm tape ok intermittently  RXN=ongoing    Amoxicillin Rash    Depakote [Divalproex Sodium]     Erythromycin Rash     .  Other reaction(s): nausea stomach pain    Hydromorphone      Other reaction(s): vomiting    Montelukast     Ampicillin Rash     Pt reports that she received a rash     Ciprofloxacin Rash          Keflex Rash     Pt states she gets a rash with this medication  Tolerates ceftriaxone  Can take with Benadryl    Levofloxacin Unspecified     Leg muscle cramps    Metronidazole Rash     \"Vision problems\"  Other reaction(s): vision problems    Penicillins Hives     Can take with Benadryl    Sulfa Drugs Itching, Myalgia and Hives     Muscle pain and weakness  Other reaction(s): unknown    Valproic Acid Rash     .       Family History:  Family History   Problem Relation Age of Onset    Hypertension Mother     Sleep Apnea Mother     Heart Disease Mother     Other Mother         hypothryod    Hypertension Maternal Uncle     Heart Disease Maternal Grandmother     Hypertension Maternal Grandmother     No Known Problems Sister     Other Sister         " Narcolepsy;fibromyalsia;bone;nerve    Genitourinary () Problems Sister         endometriosis       Social History:  Social History     Socioeconomic History    Marital status: Single     Spouse name: Not on file    Number of children: Not on file    Years of education: Not on file    Highest education level: Not on file   Occupational History    Not on file   Tobacco Use    Smoking status: Never    Smokeless tobacco: Never   Vaping Use    Vaping Use: Never used   Substance and Sexual Activity    Alcohol use: No     Alcohol/week: 0.0 oz    Drug use: Not Currently     Frequency: 7.0 times per week     Types: Marijuana, Oral    Sexual activity: Not Currently     Birth control/protection: Implant   Other Topics Concern    Not on file   Social History Narrative    ** Merged History Encounter **          Social Determinants of Health     Financial Resource Strain: Medium Risk (4/30/2021)    Overall Financial Resource Strain (CARDIA)     Difficulty of Paying Living Expenses: Somewhat hard   Food Insecurity: Food Insecurity Present (4/30/2021)    Hunger Vital Sign     Worried About Running Out of Food in the Last Year: Sometimes true     Ran Out of Food in the Last Year: Sometimes true   Transportation Needs: No Transportation Needs (2/13/2023)    PRAPARE - Transportation     Lack of Transportation (Medical): No     Lack of Transportation (Non-Medical): No   Physical Activity: Not on file   Stress: Not on file   Social Connections: Not on file   Intimate Partner Violence: Not on file   Housing Stability: Low Risk  (9/29/2020)    Housing Stability Vital Sign     Unable to Pay for Housing in the Last Year: No     Number of Places Lived in the Last Year: 1     Unstable Housing in the Last Year: No          Physical Exam:  Physical Exam    Oriented x 3  Weight/BMI: There is no height or weight on file to calculate BMI.  There were no vitals taken for this visit.    Base Eye Exam       Visual Acuity (Snellen - Linear)          Right Left    Dist cc 20/20 20/25              Tonometry (i-care, 1:32 PM)         Right Left    Pressure 17 18              Pupils         Pupils    Right PERRL    Left PERRL              Visual Fields         Right Left     Full Full              Extraocular Movement         Right Left     Full, Ortho Full, Ortho                  Additional Tests       Color         Right Left    Ishihara 9/9 9/9                  Slit Lamp and Fundus Exam       External Exam         Right Left    External Normal Normal              Slit Lamp Exam         Right Left    Lids/Lashes Normal Normal    Conjunctiva/Sclera White and quiet White and quiet    Cornea Clear Clear    Anterior Chamber Deep and quiet Deep and quiet    Iris Round and reactive Round and reactive    Lens Clear Clear    Vitreous Normal Normal              Fundus Exam         Right Left    Disc Normal Normal    C/D Ratio 0.1 0.1    Macula Normal Normal    Vessels Normal Normal    Periphery Normal Normal                  Refraction       Wearing Rx         Sphere Cylinder Axis    Right -2.00 +2.00 115    Left -0.75 +1.50 12195      Age: 1yr    Type: SVL              Manifest Refraction (Auto)         Sphere Cylinder Axis    Right -2.50 +2.75 105    Left -1.25 +2.25 078                    Pertinent Lab/Test/Imaging Review:      Assessment and Plan:     IIH (idiopathic intracranial hypertension)  8/26/2023-prior patient of Dr. Yousif.  Carries a diagnosis of possible IAH but was not taking Diamox.  On examination today there is no evidence of papilledema.  She underwent an LP in the hospital that was normal opening pressure at 14  9/11/2023-no papilledema    Optic neuritis  8/29/2023-recent admission for bilateral decreased vision associated with ocular pain on eye movements.  But diagnosed with possible optic neuritis.  Had complete work-up including MRI and MRV that were negative.  Because of a past history of possible IIH a lumbar puncture was performed.  Opening  pressure was normal.  1 white blood cell.  NMO, MOG, and AI were negative.  She was treated with 3 days of intravenous Solu-Medrol and is currently on an oral steroid taper.  She states that her visual acuity is returned to normal.  She believes this was related to recent institution of Humira a month ago.  However on examination today there is no evidence of any underlying optic nerve disorder.  Her OCT neurofibrillary thickness is 95 OD 92 OS with 0.4 cup sharp discs no swelling or pallor.  Therefore I am uncertain as to the etiology of her vision loss.  We will monitor on a steroid taper.  9/11/2023-2 recent emergency room visits for episodes of the ocular pain first on the left associated with blurry vision this lasted for a few hours then dissipated.  Then a second episode in the right eye.  She was given a trial of Pred forte drops with no significant improvement.  She was also given Ketalar rack which seemed to help slightly.  On examination today her central acuity of as well as color vision is normal.  She has no optic nerve swelling and her OCT neurofibrillary thickness is 91 OD 93 OS.  There is no evidence of a recurrent optic neuritis thus I suspect that her episodes of blurry vision associated with periocular pain and headaches is more either migraine or cluster.  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  She is being seen by the neurology nurse practitioner Sonal Rivera at [facility].  I would asked that she try to quantify better the type of headache condition she has and further treatments.  She may end up requiring a consultation with Ben Tenorio.        John Franks M.D.

## 2023-09-14 ENCOUNTER — OFFICE VISIT (OUTPATIENT)
Dept: URGENT CARE | Facility: CLINIC | Age: 33
End: 2023-09-14
Payer: MEDICARE

## 2023-09-14 VITALS
HEART RATE: 71 BPM | OXYGEN SATURATION: 98 % | DIASTOLIC BLOOD PRESSURE: 98 MMHG | BODY MASS INDEX: 42.49 KG/M2 | SYSTOLIC BLOOD PRESSURE: 136 MMHG | HEIGHT: 64 IN | RESPIRATION RATE: 20 BRPM | TEMPERATURE: 97.8 F | WEIGHT: 248.9 LBS

## 2023-09-14 DIAGNOSIS — L08.9 SKIN INFECTION: ICD-10-CM

## 2023-09-14 PROCEDURE — 3075F SYST BP GE 130 - 139MM HG: CPT

## 2023-09-14 PROCEDURE — 3080F DIAST BP >= 90 MM HG: CPT

## 2023-09-14 PROCEDURE — 99213 OFFICE O/P EST LOW 20 MIN: CPT

## 2023-09-14 RX ORDER — CLINDAMYCIN HYDROCHLORIDE 300 MG/1
300 CAPSULE ORAL 3 TIMES DAILY
Qty: 15 CAPSULE | Refills: 0 | Status: SHIPPED | OUTPATIENT
Start: 2023-09-14 | End: 2023-09-19

## 2023-09-14 RX ORDER — GALCANEZUMAB 120 MG/ML
120 INJECTION, SOLUTION SUBCUTANEOUS
COMMUNITY

## 2023-09-14 ASSESSMENT — FIBROSIS 4 INDEX: FIB4 SCORE: 0.76

## 2023-09-15 NOTE — PROGRESS NOTES
Subjective:   Kristin Balderrama is a 32 y.o. female who presents for Cyst (Cyst unde the right underarm x 2 weeks)      HPI:    Patient presents urgent care with concerns of cyst in her right armpit.  She states it has been present for 2 weeks.  She has a history of hidradenitis suppurativa can treated with Humira.  She states she received her Humira and injection today.  She denies fever, chills, nausea, vomiting.  She has allergies to multiple antibiotics.      ROS As above in HPI    Medications:    Current Outpatient Medications on File Prior to Visit   Medication Sig Dispense Refill    Galcanezumab-gnlm (EMGALITY) 120 MG/ML Solution Auto-injector Inject  under the skin.      ketorolac (ACULAR) 0.5 % Solution Administer 1 Drop into both eyes 4 times a day. 3 mL 0    fluticasone (FLONASE) 50 MCG/ACT nasal spray Administer 2 Sprays into affected nostril(S) at bedtime. 16 g 1    fluconazole (DIFLUCAN) 150 MG tablet Take 150 mg by mouth every Wednesday.      topiramate (TOPAMAX) 25 MG Tab Take 25-50 mg by mouth 2 times a day. 1 tablet (25 mg) in the morning 2 tablets (50 mg) at bedtime      metoprolol SR (TOPROL XL) 25 MG TABLET SR 24 HR Take 1 Tablet by mouth every day. 90 Tablet 3    docusate sodium (STOOL SOFTENER) 250 MG capsule Take 250 mg by mouth 2 times a day.      albuterol (PROVENTIL) 2.5mg/0.5ml Nebu Soln Take 2.5 mg by nebulization every four hours as needed for Shortness of Breath.      Adalimumab 80 MG/0.8ML Pen-injector Kit Inject 80 mg under the skin every 14 days.      albuterol 108 (90 Base) MCG/ACT Aero Soln inhalation aerosol Inhale 2 Puffs every 6 hours as needed for Shortness of Breath. 8.5 g 0    tizanidine (ZANAFLEX) 4 MG Tab Take 4 mg by mouth 3 times a day.      sodium chloride (SALT) 1 GM Tab Take 1 Tablet by mouth 3 times a day with meals. 90 Tablet 2    ivabradine (CORLANOR) 7.5 MG Tab tablet Take 1 Tablet by mouth 2 times a day with meals. 60 Tablet 2    ziprasidone (GEODON) 40 MG Cap  Take 1 capsule by mouth at bedtime. (Patient taking differently: Take 40 mg by mouth 3 times a day.) 30 Capsule 2    diphenhydrAMINE (BENADRYL) 50 MG tablet Take 50 mg by mouth at bedtime.      Melatonin 10 MG Tab Take 10 mg by mouth at bedtime.      etonogestrel (NEXPLANON) 68 MG Implant implant Inject 1 Each under the skin see administration instructions. Pt reports she is not sure when she had this medications injected last, pt reports she still has it in her arm  Every 3 years to change      benzonatate (TESSALON) 200 MG capsule Take 1 Capsule by mouth 3 times a day as needed for Cough. (Patient not taking: Reported on 9/14/2023) 30 Capsule 0    predniSONE (DELTASONE) 50 MG Tab predniSONE (Patient not taking: Reported on 9/14/2023)      topiramate (TOPAMAX) 25 MG Tab Take 25 Tablets by mouth 2 times a day. (Patient not taking: Reported on 9/14/2023)      naproxen (NAPROSYN) 500 MG Tab Take 1 Tablet by mouth 2 times a day with meals. (Patient not taking: Reported on 9/14/2023) 20 Tablet 0     No current facility-administered medications on file prior to visit.        Allergies:   Cefdinir, Depakote [divalproex sodium], Doxycycline, Montelukast [singulair], Vancomycin, Wound dressing adhesive, Amitriptyline, Aripiprazole, Aripiprazole [abilify], Clindamycin, Flomax [tamsulosin hydrochloride], Levaquin, Metformin, Tamsulosin, Tape, Amoxicillin, Depakote [divalproex sodium], Erythromycin, Hydromorphone, Montelukast, Ampicillin, Ciprofloxacin, Keflex, Levofloxacin, Metronidazole, Penicillins, Sulfa drugs, and Valproic acid    Problem List:   Patient Active Problem List   Diagnosis    Borderline personality disorder in adult (HCC)    AP (abdominal pain)    Urinary retention    Dissociative identity disorder (HCC)    Hyperglycemia    Anxiety    Fatty liver disease, nonalcoholic    Atypical chest pain    Peripheral neuropathy and chronic pain syndrome (CMS-HCC)    Morbidly obese (HCC)    Scoliosis    GERD  (gastroesophageal reflux disease)    Lactic acidosis    Vitamin D deficiency    Polypharmacy    Full incontinence of feces    Schizophrenia (HCC)    Depression    Functional diarrhea    TONYA (obstructive sleep apnea)    Muscular deconditioning    Supraventricular tachycardia (HCC)    Optic neuritis    Moderate persistent asthma with acute exacerbation    Metabolic acidosis    Immunocompromised (HCC)    Mild intermittent asthma without complication    Mixed stress and urge urinary incontinence    IIH (idiopathic intracranial hypertension)    Schizoaffective disorder, depressive type (HCC)    PTSD (post-traumatic stress disorder)    Chronic obstructive lung disease (HCC)    Transverse myelitis (HCC)    Dry eyes    Seizures ([facility])    UTI (urinary tract infection)    Suprapubic catheter dysfunction (HCC)    Bilateral hand pain    Uncomplicated asthma    Blood per rectum    GIB (gastrointestinal bleeding)    Normal pressure hydrocephalus (HCC)    Acute bilateral low back pain with bilateral sciatica    Failure to thrive in adult    Diplopia    Psychogenic disorder    Inappropriate sinus tachycardia    Palpitations    Vision changes    Hidradenitis axillaris    Migraine, hemiplegic    Irritable bowel syndrome    Hemiplegic 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, not intractable    Hypermobility syndrome    Hip fracture, left, closed, initial encounter ([facility])    Moderate asthma    Postural orthostatic tachycardia syndrome    S/p left hip fracture    Thrombocytopenia ([facility])    Right inguinal hernia    Migraine    Kidney stone    Insomnia    Heart murmur    Annetta-Danlos syndrome    Myopia of both eyes        Surgical History:  Past Surgical History:   Procedure Laterality Date    INGUINAL HERNIA LAPAROSCOPIC Right 07/21/2023    Procedure: LAPAROSCOPIC RIGHT INGUINAL HERNIA REPAIR WITH MESH;  Surgeon: Joe Noyola M.D.;  Location: SURGERY [facility];  Service: General    HERNIA REPAIR Right 07/21/2023    PB PERCUT FIX PBOX/NECK  FEMUR FX Left 01/28/2023    Procedure: FIXATION, HIP, USING CANNULATED SCREW;  Surgeon: Noman Abdul M.D.;  Location: SURGERY [facility];  Service: Orthopedics    CO LAP,DIAGNOSTIC ABDOMEN  02/14/2022    Procedure: LAPAROSCOPY;  Surgeon: Seamus Pisano M.D.;  [facility];  Service: Gynecology    OVARIAN CYSTECTOMY Right 02/14/2022    Procedure: EXCISION, CYST, OVARY;  Surgeon: Seamus Pisano M.D.;  [facility]: [facility];  Service: Gynecology    BOWEL STIMULATOR INSERTION  03/10/2021    Procedure: INSERTION, ELECTRODE LEADS AND PULSE GENERATOR, NEUROSTIMULATOR, SACRAL - REMOVAL OF INTERSTIM WITH REPLACEMENT OF SACRAL NEUROMODULATION DEVICE;  Surgeon: Joe Noyola M.D.;  Location: [facility];  Service: General    MUSCLE BIOPSY Right 01/26/2017    Procedure: MUSCLE BIOPSY - THIGH;  Surgeon: Isidro Vigil M.D.;  Location: [facility];  Service:     GASTROSCOPY WITH BALLOON DILATATION N/A 01/04/2017    Procedure: GASTROSCOPY WITH DILATATION;  Surgeon: Torres Vargas M.D.;  Location: [facility];  Service:     BOWEL STIMULATOR INSERTION  07/13/2016    Procedure: BOWEL STIMULATOR INSERTION FOR PERMANENT INTERSTIM SACRAL IMPLANT;  Surgeon: Joe Noyola M.D.;  Location: [facility];  Service:     RECOVERY  01/27/2016    Procedure: CATH LAB EP STUDY WITH SINUS NODE MODIFICATION ABHINAV;  Surgeon: Recovery[facility] Surgery;  Location: SURGERY PRE-POST PROC UNIT [facility];  Service:     OTHER CARDIAC SURGERY  01/2016    cardiac ablation    NEURO DEST FACET L/S W/IG SNGL  04/21/2015    Performed by Reza Tabor at [facility]ERY [facility]    LUMBAR FUSION ANTERIOR  08/21/2012    Performed by SUSIE SAWANT at [facility]    ALYSSA BY LAPAROSCOPY  08/29/2010    Performed by SHAYY JOHNS at [facility]    LAMINOTOMY      OTHER ABDOMINAL SURGERY      TONSILLECTOMY      tonsillectomy       Past Social Hx:   Social  "History     Tobacco Use    Smoking status: Never    Smokeless tobacco: Never   Vaping Use    Vaping Use: Never used   Substance Use Topics    Alcohol use: No     Alcohol/week: 0.0 oz    Drug use: Not Currently     Frequency: 7.0 times per week          Problem list, medications, and allergies reviewed by myself today in Epic.     Objective:     BP (!) 136/98 (BP Location: Left arm, Patient Position: Sitting, BP Cuff Size: Large adult)   Pulse 71   Temp 36.6 °C (97.8 °F) (Temporal)   Resp 20   Ht 1.626 m (5' 4\")   Wt 113 kg (248 lb 14.4 oz)   SpO2 98%   BMI 42.72 kg/m²     Physical Exam  Vitals and nursing note reviewed.   Constitutional:       General: She is not in acute distress.     Appearance: Normal appearance. She is not ill-appearing or diaphoretic.   HENT:      Head: Normocephalic.   Cardiovascular:      Rate and Rhythm: Normal rate and regular rhythm.      Heart sounds: Normal heart sounds.   Pulmonary:      Effort: Pulmonary effort is normal.      Breath sounds: Normal breath sounds.   Skin:     General: Skin is warm and dry.      Capillary Refill: Capillary refill takes less than 2 seconds.      Findings: Abscess present.      Comments: Right axilla has  1 cm x 1.5 cm nodule. There is associated tenderness and fluctuant to palpation. There is erythema, warmth. No drainage.   Neurological:      Mental Status: She is alert and oriented to person, place, and time.         Assessment/Plan:     Diagnosis and associated orders:   1. Skin infection  - clindamycin (CLEOCIN) 300 MG Cap; Take 1 Capsule by mouth 3 times a day for 5 days.  Dispense: 15 Capsule; Refill: 0    Other orders  - Galcanezumab-gnlm (EMGALITY) 120 MG/ML Solution Auto-injector; Inject  under the skin.        Comments/MDM:     -Rest, stay hydrated  - warm compresses 10min 6-8x/day x 2 days  - 2 day recheck as needed  - E.R. precautions discussed        Please note that this dictation was created using voice recognition software. I have " made a reasonable attempt to correct obvious errors, but I expect that there are errors of grammar and possibly content that I did not discover before finalizing the note.    This note was electronically signed by EDD Javier

## 2023-09-21 ENCOUNTER — OFFICE VISIT (OUTPATIENT)
Dept: URGENT CARE | Facility: CLINIC | Age: 33
End: 2023-09-21
Payer: MEDICARE

## 2023-09-21 VITALS
TEMPERATURE: 97.1 F | DIASTOLIC BLOOD PRESSURE: 82 MMHG | WEIGHT: 248 LBS | HEIGHT: 64 IN | RESPIRATION RATE: 20 BRPM | SYSTOLIC BLOOD PRESSURE: 130 MMHG | BODY MASS INDEX: 42.34 KG/M2 | OXYGEN SATURATION: 97 % | HEART RATE: 74 BPM

## 2023-09-21 DIAGNOSIS — L73.9 FOLLICULITIS: ICD-10-CM

## 2023-09-21 PROCEDURE — 3075F SYST BP GE 130 - 139MM HG: CPT | Performed by: PHYSICIAN ASSISTANT

## 2023-09-21 PROCEDURE — 3079F DIAST BP 80-89 MM HG: CPT | Performed by: PHYSICIAN ASSISTANT

## 2023-09-21 PROCEDURE — 99213 OFFICE O/P EST LOW 20 MIN: CPT | Performed by: PHYSICIAN ASSISTANT

## 2023-09-21 RX ORDER — CLINDAMYCIN HYDROCHLORIDE 300 MG/1
300 CAPSULE ORAL 3 TIMES DAILY
Qty: 15 CAPSULE | Refills: 0 | Status: SHIPPED | OUTPATIENT
Start: 2023-09-21 | End: 2023-09-26

## 2023-09-21 ASSESSMENT — FIBROSIS 4 INDEX: FIB4 SCORE: 0.76

## 2023-09-21 NOTE — PROGRESS NOTES
"Subjective:   Kristin Balderrama is a 32 y.o. female who presents for Abscess (Abscess on both arm pits that keep reoccurring. )      HPI  The patient presents to the Urgent Care with complaints of painful lumps to her armpits.  History of hidradenitis suppurativa.  She had skin infection on 9/14 and was prescribed antibiotics and responded well.  A couple days afterwards, she reports she had to shave her bilateral axilla for an interview. She gets flareups of the at bedtime after shaving.  She reports a #3 lesions.  No drainage.  No fevers or chills.      Past Medical History:   Diagnosis Date    Abdominal pain     Anginal syndrome     random chest pain especially with tachycardia    Apnea, sleep     Arrhythmia     \"sinus tachycardia\", cariologist, Dr. Kumar; ablation 2/2016    Arthritis     osteo    ASTHMA     when around smoke    Back pain     Borderline personality disorder (HCC)     Breath shortness     with tachycardia    Bronchitis 02/08/2022    Last time was 12/21    Cardiac arrhythmia     Chickenpox     Chronic UTI 09/18/2010    Cough     Daytime sleepiness     Dental disorder 03/08/2021    infected tooth    Depression     Diabetes (HCC)     Diarrhea     incontinece    Disorder of thyroid     Hashimoto's    Fall     Fatigue     Frequent headaches     Gasping for breath     Gynecological disorder     PCOS    Headache(784.0)     Heart burn     Heart murmur     History of falling     Indigestion     Migraine     Mitochondrial disease (HCC)     Multiple personality disorder (HCC)     Nausea     Obesity     Other fatigue 06/29/2020    Pain 08/15/2012    back, 7/10    Painful joint     PCOS (polycystic ovarian syndrome)     Pneumonia 2012 12/2020    POTS (postural orthostatic tachycardia syndrome)     Psychosis (HCC)     Ringing in ears     Scoliosis     Shortness of breath     O2 as needed    Sinus tachycardia 10/31/2013    Sleep apnea     CPAP \"pulmonary doctor took me off mid year 2016\"    Snoring     " "Supraventricular tachycardia (HCC) 4/10/2019    Tonsillitis     Transverse myelitis (HCC)     2/8/22: Per pt: not anymore    Tuberculosis     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    Urinary bladder disorder     Suprapubic cath. 2/8/22: Not anymore.     Urinary incontinence     Weakness     Wears glasses      Allergies   Allergen Reactions    Cefdinir Shortness of Breath and Itching     Tolerated 1/18/17  Tolerates ceftriaxone  Tolerated augmentin 8/2019     Depakote [Divalproex Sodium] Unspecified     Muscle spasms/muscle pain and weakness      Doxycycline Anaphylaxis and Vomiting     Other reaction(s): pustules/blisters  Other reaction(s): stomach pain    Montelukast [Singulair] Unspecified     Cardiac effusion    Vancomycin Itching     Pt becomes flushed in face and chest.   RXN=7/10/16    Wound Dressing Adhesive Rash     By pt report-\"removes skin totally off\"    Amitriptyline Unspecified     Headaches      Aripiprazole Unspecified    Aripiprazole [Abilify] Unspecified     Headaches/muscle twitching      Clindamycin Nausea         Other reaction(s): nausea stomach pain    Flomax [Tamsulosin Hydrochloride] Swelling    Levaquin Unspecified     Severe muscle cramps in legs  RXN=unknown  Other reaction(s): leg muscle cramps    Metformin Unspecified     Increased lactic acid     Other reaction(s): itching and rash/nausea vomiting    Tamsulosin Swelling     Swelling of legs    Tape Rash     Tears skin off  coban with Tegaderm tape ok intermittently  RXN=ongoing    Amoxicillin Rash    Depakote [Divalproex Sodium]     Erythromycin Rash     .  Other reaction(s): nausea stomach pain    Hydromorphone      Other reaction(s): vomiting    Montelukast     Ampicillin Rash     Pt reports that she received a rash     Ciprofloxacin Rash          Keflex Rash     Pt states she gets a rash with this medication  Tolerates ceftriaxone  Can take with Benadryl    Levofloxacin Unspecified     Leg muscle cramps    Metronidazole Rash " "    \"Vision problems\"  Other reaction(s): vision problems    Penicillins Hives     Can take with Benadryl    Sulfa Drugs Itching, Myalgia and Hives     Muscle pain and weakness  Other reaction(s): unknown    Valproic Acid Rash     .        Objective:     /82 (BP Location: Left arm, Patient Position: Sitting, BP Cuff Size: Large adult)   Pulse 74   Temp 36.2 °C (97.1 °F) (Temporal)   Resp 20   Ht 1.626 m (5' 4\")   Wt 112 kg (248 lb)   SpO2 97%   BMI 42.57 kg/m²     Physical Exam  Vitals reviewed.   Constitutional:       General: She is not in acute distress.     Appearance: Normal appearance. She is not ill-appearing or toxic-appearing.   Eyes:      Conjunctiva/sclera: Conjunctivae normal.   Cardiovascular:      Rate and Rhythm: Normal rate.   Pulmonary:      Effort: Pulmonary effort is normal.   Musculoskeletal:      Cervical back: Neck supple.   Skin:     General: Skin is warm and dry.      Comments: Few small erythematous papules to bilateral axilla.  No fluctuance or large abscess.  No significant induration. No discharge or drainage.    Neurological:      General: No focal deficit present.      Mental Status: She is alert and oriented to person, place, and time.   Psychiatric:         Mood and Affect: Mood normal.         Behavior: Behavior normal.         Diagnosis and associated orders:     1. Folliculitis  - clindamycin (CLEOCIN) 300 MG Cap; Take 1 Capsule by mouth 3 times a day for 5 days.  Dispense: 15 Capsule; Refill: 0  - mupirocin (BACTROBAN) 2 % Ointment; Apply 1 Application topically 2 times a day.  Dispense: 22 g; Refill: 0       Comments/MDM:     Treatment as above.  Avoid shaving.  Gentle soap and water.  Warm compresses.       I personally reviewed prior external notes and test results pertinent to today's visit. Pathogenesis of diagnosis discussed including typical length and natural progression. Supportive care, natural history, differential diagnoses, and indications for immediate " follow-up discussed. Patient expresses understanding and agrees to plan. Patient denies any other questions or concerns.     Follow-up with the primary care physician for recheck, reevaluation, and consideration of further management.    Please note that this dictation was created using voice recognition software. I have made a reasonable attempt to correct obvious errors, but I expect that there are errors of grammar and possibly content that I did not discover before finalizing the note.    This note was electronically signed by Jose Suazo PA-C

## 2023-09-23 ENCOUNTER — HOSPITAL ENCOUNTER (OUTPATIENT)
Dept: RADIOLOGY | Facility: MEDICAL CENTER | Age: 33
End: 2023-09-23
Attending: NURSE PRACTITIONER
Payer: MEDICARE

## 2023-09-23 DIAGNOSIS — G43.409 HEMIPLEGIC MIGRAINE, NOT INTRACTABLE, WITHOUT STATUS MIGRAINOSUS: ICD-10-CM

## 2023-09-23 PROCEDURE — A9579 GAD-BASE MR CONTRAST NOS,1ML: HCPCS | Mod: UD | Performed by: NURSE PRACTITIONER

## 2023-09-23 PROCEDURE — 700117 HCHG RX CONTRAST REV CODE 255: Mod: UD | Performed by: NURSE PRACTITIONER

## 2023-09-23 PROCEDURE — 70553 MRI BRAIN STEM W/O & W/DYE: CPT

## 2023-09-23 RX ADMIN — GADOTERIDOL 20 ML: 279.3 INJECTION, SOLUTION INTRAVENOUS at 16:53

## 2023-09-28 ENCOUNTER — APPOINTMENT (OUTPATIENT)
Dept: RADIOLOGY | Facility: MEDICAL CENTER | Age: 33
End: 2023-09-28
Attending: EMERGENCY MEDICINE
Payer: MEDICARE

## 2023-09-28 ENCOUNTER — HOSPITAL ENCOUNTER (EMERGENCY)
Facility: MEDICAL CENTER | Age: 33
End: 2023-09-28
Attending: EMERGENCY MEDICINE
Payer: MEDICARE

## 2023-09-28 ENCOUNTER — OFFICE VISIT (OUTPATIENT)
Dept: URGENT CARE | Facility: CLINIC | Age: 33
End: 2023-09-28
Payer: MEDICARE

## 2023-09-28 VITALS
HEART RATE: 73 BPM | DIASTOLIC BLOOD PRESSURE: 77 MMHG | TEMPERATURE: 97.9 F | OXYGEN SATURATION: 99 % | SYSTOLIC BLOOD PRESSURE: 130 MMHG | RESPIRATION RATE: 20 BRPM | BODY MASS INDEX: 43.1 KG/M2 | WEIGHT: 251.1 LBS

## 2023-09-28 VITALS
DIASTOLIC BLOOD PRESSURE: 84 MMHG | WEIGHT: 250 LBS | HEART RATE: 72 BPM | BODY MASS INDEX: 42.68 KG/M2 | HEIGHT: 64 IN | RESPIRATION RATE: 16 BRPM | TEMPERATURE: 96.5 F | SYSTOLIC BLOOD PRESSURE: 118 MMHG | OXYGEN SATURATION: 97 %

## 2023-09-28 DIAGNOSIS — Z87.440 HISTORY OF RECURRENT UTI (URINARY TRACT INFECTION): ICD-10-CM

## 2023-09-28 DIAGNOSIS — M54.9 PAIN, UPPER BACK: ICD-10-CM

## 2023-09-28 DIAGNOSIS — R30.0 DYSURIA: ICD-10-CM

## 2023-09-28 LAB
ALBUMIN SERPL BCP-MCNC: 4.6 G/DL (ref 3.2–4.9)
ALBUMIN/GLOB SERPL: 2.3 G/DL
ALP SERPL-CCNC: 64 U/L (ref 30–99)
ALT SERPL-CCNC: 20 U/L (ref 2–50)
ANION GAP SERPL CALC-SCNC: 11 MMOL/L (ref 7–16)
APPEARANCE UR: NORMAL
AST SERPL-CCNC: 15 U/L (ref 12–45)
BASOPHILS # BLD AUTO: 1 % (ref 0–1.8)
BASOPHILS # BLD: 0.05 K/UL (ref 0–0.12)
BILIRUB SERPL-MCNC: 0.4 MG/DL (ref 0.1–1.5)
BILIRUB UR STRIP-MCNC: NORMAL MG/DL
BUN SERPL-MCNC: 14 MG/DL (ref 8–22)
CALCIUM ALBUM COR SERPL-MCNC: 9.3 MG/DL (ref 8.5–10.5)
CALCIUM SERPL-MCNC: 9.8 MG/DL (ref 8.5–10.5)
CHLORIDE SERPL-SCNC: 111 MMOL/L (ref 96–112)
CO2 SERPL-SCNC: 18 MMOL/L (ref 20–33)
COLOR UR AUTO: NORMAL
CREAT SERPL-MCNC: 0.76 MG/DL (ref 0.5–1.4)
D DIMER PPP IA.FEU-MCNC: <0.27 UG/ML (FEU) (ref 0–0.5)
EKG IMPRESSION: NORMAL
EOSINOPHIL # BLD AUTO: 0.07 K/UL (ref 0–0.51)
EOSINOPHIL NFR BLD: 1.4 % (ref 0–6.9)
ERYTHROCYTE [DISTWIDTH] IN BLOOD BY AUTOMATED COUNT: 43.3 FL (ref 35.9–50)
GFR SERPLBLD CREATININE-BSD FMLA CKD-EPI: 105 ML/MIN/1.73 M 2
GLOBULIN SER CALC-MCNC: 2 G/DL (ref 1.9–3.5)
GLUCOSE SERPL-MCNC: 98 MG/DL (ref 65–99)
GLUCOSE UR STRIP.AUTO-MCNC: NORMAL MG/DL
HCG SERPL QL: NEGATIVE
HCT VFR BLD AUTO: 40.9 % (ref 37–47)
HGB BLD-MCNC: 14.1 G/DL (ref 12–16)
IMM GRANULOCYTES # BLD AUTO: 0.03 K/UL (ref 0–0.11)
IMM GRANULOCYTES NFR BLD AUTO: 0.6 % (ref 0–0.9)
KETONES UR STRIP.AUTO-MCNC: NORMAL MG/DL
LEUKOCYTE ESTERASE UR QL STRIP.AUTO: NORMAL
LIPASE SERPL-CCNC: 35 U/L (ref 11–82)
LYMPHOCYTES # BLD AUTO: 2.03 K/UL (ref 1–4.8)
LYMPHOCYTES NFR BLD: 40.6 % (ref 22–41)
MCH RBC QN AUTO: 31.1 PG (ref 27–33)
MCHC RBC AUTO-ENTMCNC: 34.5 G/DL (ref 32.2–35.5)
MCV RBC AUTO: 90.1 FL (ref 81.4–97.8)
MONOCYTES # BLD AUTO: 0.44 K/UL (ref 0–0.85)
MONOCYTES NFR BLD AUTO: 8.8 % (ref 0–13.4)
NEUTROPHILS # BLD AUTO: 2.38 K/UL (ref 1.82–7.42)
NEUTROPHILS NFR BLD: 47.6 % (ref 44–72)
NITRITE UR QL STRIP.AUTO: NORMAL
NRBC # BLD AUTO: 0 K/UL
NRBC BLD-RTO: 0 /100 WBC (ref 0–0.2)
PH UR STRIP.AUTO: 5.5 [PH] (ref 5–8)
PLATELET # BLD AUTO: 171 K/UL (ref 164–446)
PMV BLD AUTO: 11.5 FL (ref 9–12.9)
POCT INT CON NEG: NEGATIVE
POCT INT CON POS: POSITIVE
POCT URINE PREGNANCY TEST: NEGATIVE
POTASSIUM SERPL-SCNC: 3.9 MMOL/L (ref 3.6–5.5)
PROT SERPL-MCNC: 6.6 G/DL (ref 6–8.2)
PROT UR QL STRIP: NORMAL MG/DL
RBC # BLD AUTO: 4.54 M/UL (ref 4.2–5.4)
RBC UR QL AUTO: NORMAL
SODIUM SERPL-SCNC: 140 MMOL/L (ref 135–145)
SP GR UR STRIP.AUTO: 1.03
TROPONIN T SERPL-MCNC: <6 NG/L (ref 6–19)
UROBILINOGEN UR STRIP-MCNC: 0.2 MG/DL
WBC # BLD AUTO: 5 K/UL (ref 4.8–10.8)

## 2023-09-28 PROCEDURE — 81002 URINALYSIS NONAUTO W/O SCOPE: CPT | Performed by: PHYSICIAN ASSISTANT

## 2023-09-28 PROCEDURE — 85379 FIBRIN DEGRADATION QUANT: CPT

## 2023-09-28 PROCEDURE — 84703 CHORIONIC GONADOTROPIN ASSAY: CPT

## 2023-09-28 PROCEDURE — 72128 CT CHEST SPINE W/O DYE: CPT

## 2023-09-28 PROCEDURE — 3079F DIAST BP 80-89 MM HG: CPT | Performed by: PHYSICIAN ASSISTANT

## 2023-09-28 PROCEDURE — 72131 CT LUMBAR SPINE W/O DYE: CPT

## 2023-09-28 PROCEDURE — 99285 EMERGENCY DEPT VISIT HI MDM: CPT

## 2023-09-28 PROCEDURE — 70450 CT HEAD/BRAIN W/O DYE: CPT

## 2023-09-28 PROCEDURE — 85025 COMPLETE CBC W/AUTO DIFF WBC: CPT

## 2023-09-28 PROCEDURE — 93005 ELECTROCARDIOGRAM TRACING: CPT

## 2023-09-28 PROCEDURE — 700101 HCHG RX REV CODE 250: Mod: UD | Performed by: EMERGENCY MEDICINE

## 2023-09-28 PROCEDURE — 84484 ASSAY OF TROPONIN QUANT: CPT

## 2023-09-28 PROCEDURE — 3074F SYST BP LT 130 MM HG: CPT | Performed by: PHYSICIAN ASSISTANT

## 2023-09-28 PROCEDURE — 81025 URINE PREGNANCY TEST: CPT | Performed by: PHYSICIAN ASSISTANT

## 2023-09-28 PROCEDURE — 99213 OFFICE O/P EST LOW 20 MIN: CPT | Performed by: PHYSICIAN ASSISTANT

## 2023-09-28 PROCEDURE — 80053 COMPREHEN METABOLIC PANEL: CPT

## 2023-09-28 PROCEDURE — 72125 CT NECK SPINE W/O DYE: CPT

## 2023-09-28 PROCEDURE — 83690 ASSAY OF LIPASE: CPT

## 2023-09-28 PROCEDURE — 93005 ELECTROCARDIOGRAM TRACING: CPT | Performed by: EMERGENCY MEDICINE

## 2023-09-28 RX ORDER — LIDOCAINE 50 MG/G
1 PATCH TOPICAL EVERY 24 HOURS
Status: DISCONTINUED | OUTPATIENT
Start: 2023-09-28 | End: 2023-09-28 | Stop reason: HOSPADM

## 2023-09-28 RX ADMIN — LIDOCAINE 1 PATCH: 50 PATCH TOPICAL at 16:08

## 2023-09-28 ASSESSMENT — PAIN DESCRIPTION - PAIN TYPE
TYPE: ACUTE PAIN
TYPE: ACUTE PAIN

## 2023-09-28 ASSESSMENT — ENCOUNTER SYMPTOMS
NAUSEA: 0
VOMITING: 0
CHILLS: 0
FEVER: 0
ABDOMINAL PAIN: 0
FLANK PAIN: 0

## 2023-09-28 ASSESSMENT — FIBROSIS 4 INDEX
FIB4 SCORE: 0.76
FIB4 SCORE: 0.76

## 2023-09-28 NOTE — PROGRESS NOTES
"Subjective:   Kristin Balderrama  is a 32 y.o. female who presents for UTI (X 3 days, dysuria)      UTI  This is a new problem. The current episode started in the past 7 days. Pertinent negatives include no abdominal pain, chills, fever, nausea or vomiting.   Patient presents urgent care noting symptoms of urinary tract infection and present for the last 3 days.  She describes dysuria frequency urgency.  She denies hematuria.  Denies fever or chills.  Patient denies new back pain (has a history of chronic back pain and did see orthopedics for this today).  He states symptoms began shortly after finishing course of clindamycin for abscess.  Patient does have a history of recurrent urinary tract infection.    Review of Systems   Constitutional:  Negative for chills and fever.   Gastrointestinal:  Negative for abdominal pain, nausea and vomiting.   Genitourinary:  Positive for dysuria, frequency and urgency. Negative for flank pain and hematuria.       Allergies   Allergen Reactions    Cefdinir Shortness of Breath and Itching     Tolerated 1/18/17  Tolerates ceftriaxone  Tolerated augmentin 8/2019     Depakote [Divalproex Sodium] Unspecified     Muscle spasms/muscle pain and weakness      Doxycycline Anaphylaxis and Vomiting     Other reaction(s): pustules/blisters  Other reaction(s): stomach pain    Montelukast [Singulair] Unspecified     Cardiac effusion    Vancomycin Itching     Pt becomes flushed in face and chest.   RXN=7/10/16    Wound Dressing Adhesive Rash     By pt report-\"removes skin totally off\"    Amitriptyline Unspecified     Headaches      Aripiprazole Unspecified    Aripiprazole [Abilify] Unspecified     Headaches/muscle twitching      Clindamycin Nausea         Other reaction(s): nausea stomach pain    Flomax [Tamsulosin Hydrochloride] Swelling    Levaquin Unspecified     Severe muscle cramps in legs  RXN=unknown  Other reaction(s): leg muscle cramps    Metformin Unspecified     Increased lactic acid " "    Other reaction(s): itching and rash/nausea vomiting    Tamsulosin Swelling     Swelling of legs    Tape Rash     Tears skin off  coban with Tegaderm tape ok intermittently  RXN=ongoing    Amoxicillin Rash    Depakote [Divalproex Sodium]     Erythromycin Rash     .  Other reaction(s): nausea stomach pain    Hydromorphone      Other reaction(s): vomiting    Montelukast     Ampicillin Rash     Pt reports that she received a rash     Ciprofloxacin Rash          Keflex Rash     Pt states she gets a rash with this medication  Tolerates ceftriaxone  Can take with Benadryl    Levofloxacin Unspecified     Leg muscle cramps    Metronidazole Rash     \"Vision problems\"  Other reaction(s): vision problems    Penicillins Hives     Can take with Benadryl    Sulfa Drugs Itching, Myalgia and Hives     Muscle pain and weakness  Other reaction(s): unknown    Valproic Acid Rash     .        Objective:   /84 (BP Location: Right arm, Patient Position: Sitting, BP Cuff Size: Adult)   Pulse 72   Temp 35.8 °C (96.5 °F)   Resp 16   Ht 1.626 m (5' 4\")   Wt 113 kg (250 lb)   SpO2 97%   BMI 42.91 kg/m²     Physical Exam  Vitals and nursing note reviewed.   Constitutional:       General: She is not in acute distress.     Appearance: Normal appearance. She is well-developed. She is not diaphoretic.   HENT:      Head: Normocephalic and atraumatic.      Right Ear: External ear normal.      Left Ear: External ear normal.      Nose: Nose normal.   Eyes:      General: Lids are normal. No scleral icterus.        Right eye: No discharge.         Left eye: No discharge.      Conjunctiva/sclera: Conjunctivae normal.   Pulmonary:      Effort: Pulmonary effort is normal. No respiratory distress.   Abdominal:      Palpations: Abdomen is soft. Abdomen is not rigid.      Tenderness: There is no right CVA tenderness, left CVA tenderness or guarding.   Musculoskeletal:         General: Normal range of motion.      Cervical back: Neck supple. "   Skin:     General: Skin is warm and dry.      Coloration: Skin is not pale.      Findings: No erythema.   Neurological:      Mental Status: She is alert and oriented to person, place, and time. She is not disoriented.   Psychiatric:         Speech: Speech normal.         Behavior: Behavior normal.       Results for orders placed or performed in visit on 09/28/23   POCT Urinalysis   Result Value Ref Range    POC Color bunny Negative    POC Appearance cloudy Negative    POC Glucose neg Negative mg/dL    POC Bilirubin neg Negative mg/dL    POC Ketones neg Negative mg/dL    POC Specific Gravity 1.030 <1.005 - >1.030    POC Blood small Negative    POC Urine PH 5.5 5.0 - 8.0    POC Protein neg Negative mg/dL    POC Urobiligen 0.2 Negative (0.2) mg/dL    POC Nitrites neg Negative    POC Leukocyte Esterase trace Negative   POCT PREGNANCY   Result Value Ref Range    POC Urine Pregnancy Test Negative     Internal Control Positive Positive     Internal Control Negative Negative      *Note: Due to a large number of results and/or encounters for the requested time period, some results have not been displayed. A complete set of results can be found in Results Review.       Urine culture is pending    Assessment/Plan:   1. Dysuria  - POCT Urinalysis  - POCT PREGNANCY  - URINE CULTURE(NEW); Future    2. History of recurrent UTI (urinary tract infection)  - POCT PREGNANCY  - URINE CULTURE(NEW); Future  Supportive care is reviewed with patient/caregiver - recommend to push PO fluids and electrolytes, nsaids/tylenol, rest, full course of abx, probiotics w/ abx, azo/cran, observe for resolution  Return to clinic with lack of resolution or progression of symptoms.  Patient encouraged to watch for urine culture and stop antibiotics if it is negative    I have worn an N95 mask, gloves and eye protection for the entire encounter with this patient.     Differential diagnosis, natural history, supportive care, and indications for immediate  follow-up discussed.

## 2023-09-28 NOTE — ED TRIAGE NOTES
"Chief Complaint   Patient presents with    Back Pain     After fall 1 week ago    Chest Pain    Shortness of Breath     Pt reports that she went to [facility] this morning for this problem. States that ever since the provider \"pushed\" on her back she developed CP and SOB.  Pt was also seen by family medicine after this for possible UTI.    Reports that \"I took every medication that I had for this\" with no relief.    BP (!) 142/86   Pulse 65   Temp 36.9 °C (98.4 °F) (Temporal)   Resp 18   Wt 114 kg (251 lb 1.7 oz)   SpO2 100%   Pt informed of wait times. Educated on triage process.  Asked to return to triage RN for any new or worsening of symptoms. Thanked for patience.      "

## 2023-09-28 NOTE — ED NOTES
Pt ambulatory to [facility] with cane, changed into gown and connected to monitor. Chart up for ERP.

## 2023-09-29 NOTE — ED NOTES
Pt provided discharge instructions and follow-up information. Pt verbalized understanding and all questions answered. Pt ambulated from ED with steady gait using cane carrying all belongings.

## 2023-09-29 NOTE — ED NOTES
ERP at bedside to discuss results with pt.    Stage 6: Number Of Blocks?: 0 Display The Frozen Section And Histology As A Separate Paragraph: No Epidermal Closure Graft Donor Site (Optional): running Depth Of Tumor Invasion (For Histology): tumor not visualized (deep and peripheral margins are clear of tumor) Stage 12: Additional Anesthesia Type: 1% lidocaine with epinephrine Include Anticoagulation In Mohs Note?: Yes Helical Rim Advancement Flap Text: The defect edges were debeveled with a #15 blade scalpel.  Given the location of the defect and the proximity to free margins (helical rim) a double helical rim advancement flap was deemed most appropriate.  Using a sterile surgical marker, the appropriate advancement flaps were drawn incorporating the defect and placing the expected incisions between the helical rim and antihelix where possible.  The area thus outlined was incised through and through with a #15 scalpel blade.  With a skin hook and iris scissors, the flaps were gently and sharply undermined and freed up. Estimated Blood Loss (Cc): minimal Consent (Near Eyelid Margin)/Introductory Paragraph: The rationale for Mohs was explained to the patient and consent was obtained. The risks, benefits and alternatives to therapy were discussed in detail. Specifically, the risks of ectropion or eyelid deformity, infection, scarring, bleeding, prolonged wound healing, incomplete removal, allergy to anesthesia, nerve injury and recurrence were addressed. Prior to the procedure, the treatment site was clearly identified and confirmed by the patient. All components of Universal Protocol/PAUSE Rule completed. Closure 4 Information: This tab is for additional flaps and grafts above and beyond our usual structured repairs.  Please note if you enter information here it will not currently bill and you will need to add the billing information manually. Alternatives Discussed Intro (Do Not Add Period): I discussed alternative treatments to Mohs surgery and specifically discussed the risks and benefits of Mohs Rapid Report Verbiage: The area of clinically evident tumor was marked with skin marking ink and appropriately hatched.  The initial incision was made following the Mohs approach through the skin.  The specimen was taken to the lab, divided into the necessary number of pieces, chromacoded and processed according to the Mohs protocol.  This was repeated in successive stages until a tumor free defect was achieved. Melolabial Transposition Flap Text: The defect edges were debeveled with a #15 scalpel blade.  Given the location of the defect and the proximity to free margins a melolabial flap was deemed most appropriate.  Using a sterile surgical marker, an appropriate melolabial transposition flap was drawn incorporating the defect.    The area thus outlined was incised deep to adipose tissue with a #15 scalpel blade.  The skin margins were undermined to an appropriate distance in all directions utilizing iris scissors. Peng Advancement Flap Text: The defect edges were debeveled with a #15 scalpel blade.  Given the location of the defect, shape of the defect and the proximity to free margins a Peng advancement flap was deemed most appropriate.  Using a sterile surgical marker, an appropriate advancement flap was drawn incorporating the defect and placing the expected incisions within the relaxed skin tension lines where possible. The area thus outlined was incised deep to adipose tissue with a #15 scalpel blade.  The skin margins were undermined to an appropriate distance in all directions utilizing iris scissors. Vermilion Border Text: The closure involved the vermilion border. Mohs Histo Method Verbiage: Each section was then chromacoded and processed in the Mohs lab using the Mohs protocol and submitted for frozen section. Graft Donor Site Epidermal Sutures (Optional): 6-0 Nylon Graft Donor Site Bandage (Optional-Leave Blank If You Don't Want In Note): Steri-strips and a pressure bandage were applied to the donor site. Ear Wedge Repair Text: A wedge excision was completed by carrying down an excision through the full thickness of the ear and cartilage with an inward facing Burow's triangle. The wound was then closed in a layered fashion. Crescentic Intermediate Repair Preamble Text (Leave Blank If You Do Not Want): Undermining was performed with blunt dissection. Cheiloplasty (Complex) Text: A decision was made to reconstruct the defect with a  cheiloplasty.  The defect was undermined extensively.  Additional obicularis oris muscle was excised with a 15 blade scalpel.  The defect was converted into a full thickness wedge to facilite a better cosmetic result.  Small vessels were then tied off with 5-0 monocyrl. The obicularis oris, superficial fascia, adipose and dermis were then reapproximated.  After the deeper layers were approximated the epidermis was reapproximated with particular care given to realign the vermilion border. Plastic Surgeon Procedure Text (F): After obtaining clear surgical margins the patient was sent to plastics for surgical repair.  The patient understands they will receive post-surgical care and follow-up from the referring physician's office. Why Was The Change Made?: Please Select the Appropriate Response Perineural Invasion (For Histology - Be Specific If Possible): absent Bcc  Nodular Histology Text: Basaloid cells with clefting H Plasty Text: Given the location of the defect, shape of the defect and the proximity to free margins a H-plasty was deemed most appropriate for repair.  Using a sterile surgical marker, the appropriate advancement arms of the H-plasty were drawn incorporating the defect and placing the expected incisions within the relaxed skin tension lines where possible. The area thus outlined was incised deep to adipose tissue with a #15 scalpel blade. The skin margins were undermined to an appropriate distance in all directions utilizing iris scissors.  The opposing advancement arms were then advanced into place in opposite direction and anchored with interrupted buried subcutaneous sutures. Consent 2/Introductory Paragraph: Mohs surgery was explained to the patient and consent was obtained. The risks, benefits and alternatives to therapy were discussed in detail. Specifically, the risks of infection, scarring, bleeding, prolonged wound healing, incomplete removal, allergy to anesthesia, nerve injury and recurrence were addressed. Prior to the procedure, the treatment site was clearly identified and confirmed by the patient. All components of Universal Protocol/PAUSE Rule completed. Hemigard Intro: Due to skin fragility and wound tension, it was decided to use HEMIGARD adhesive retention suture devices to permit a linear closure. The skin was cleaned and dried for a 6cm distance away from the wound. Excessive hair, if present, was removed to allow for adhesion. Special Stains Stage 8 - Results: Base On Clearance Noted Above Purse String (Simple) Text: Given the location of the defect and the characteristics of the surrounding skin a pursestring closure was deemed most appropriate.  Undermining was performed circumfirentially around the surgical defect.  A purstring suture was then placed and tightened. Nostril Rim Text: The closure involved the nostril rim. Island Pedicle Flap With Canthal Suspension Text: The defect edges were debeveled with a #15 scalpel blade.  Given the location of the defect, shape of the defect and the proximity to free margins an island pedicle advancement flap was deemed most appropriate.  Using a sterile surgical marker, an appropriate advancement flap was drawn incorporating the defect, outlining the appropriate donor tissue and placing the expected incisions within the relaxed skin tension lines where possible. The area thus outlined was incised deep to adipose tissue with a #15 scalpel blade.  The skin margins were undermined to an appropriate distance in all directions around the primary defect and laterally outward around the island pedicle utilizing iris scissors.  There was minimal undermining beneath the pedicle flap. A suspension suture was placed in the canthal tendon to prevent tension and prevent ectropion. Closure 2 Information: This tab is for additional flaps and grafts, including complex repair and grafts and complex repair and flaps. You can also specify a different location for the additional defect, if the location is the same you do not need to select a new one. We will insert the automated text for the repair you select below just as we do for solitary flaps and grafts. Please note that at this time if you select a location with a different insurance zone you will need to override the ICD10 and CPT if appropriate. Hemostasis: Electrocautery Provider Procedure Text (D): After obtaining clear surgical margins the defect was repaired by another provider. Oculoplastic Surgeon Procedure Text (E): After obtaining clear surgical margins the patient was sent to oculoplastics for surgical repair.  The patient understands they will receive post-surgical care and follow-up from the referring physician's office. Bcc Infiltrative Histology Text: There were numerous aggregates of basaloid cells demonstrating an infiltrative pattern. Bilobed Flap Text: The defect edges were debeveled with a #15 scalpel blade.  Given the location of the defect and the proximity to free margins a bilobe flap was deemed most appropriate.  Using a sterile surgical marker, an appropriate bilobe flap drawn around the defect.    The area thus outlined was incised deep to adipose tissue with a #15 scalpel blade.  The skin margins were undermined to an appropriate distance in all directions utilizing iris scissors. Mid-Level Procedure Text (C): After obtaining clear surgical margins the patient was sent to a mid-level provider for surgical repair.  The patient understands they will receive post-surgical care and follow-up from the mid-level provider. Double Z Plasty Text: The lesion was extirpated to the level of the fat with a #15 scalpel blade. Given the location of the defect, shape of the defect and the proximity to free margins a double Z-plasty was deemed most appropriate for repair. Using a sterile surgical marker, the appropriate transposition arms of the double Z-plasty were drawn incorporating the defect and placing the expected incisions within the relaxed skin tension lines where possible. The area thus outlined was incised deep to adipose tissue with a #15 scalpel blade. The skin margins were undermined to an appropriate distance in all directions utilizing iris scissors. The opposing transposition arms were then transposed and carried over into place in opposite direction and anchored with interrupted buried subcutaneous sutures. Star Wedge Flap Text: The defect edges were debeveled with a #15 scalpel blade.  Given the location of the defect, shape of the defect and the proximity to free margins a star wedge flap was deemed most appropriate.  Using a sterile surgical marker, an appropriate rotation flap was drawn incorporating the defect and placing the expected incisions within the relaxed skin tension lines where possible. The area thus outlined was incised deep to adipose tissue with a #15 scalpel blade.  The skin margins were undermined to an appropriate distance in all directions utilizing iris scissors. Intermediate Repair And Flap Additional Text (Will Appearing After The Standard Complex Repair Text): The intermediate repair was not sufficient to completely close the primary defect. The remaining additional defect was repaired with the flap mentioned below. Advancement Flap (Double) Text: The defect edges were debeveled with a #15 scalpel blade.  Given the location of the defect and the proximity to free margins a double advancement flap was deemed most appropriate.  Using a sterile surgical marker, the appropriate advancement flaps were drawn incorporating the defect and placing the expected incisions within the relaxed skin tension lines where possible.    The area thus outlined was incised deep to adipose tissue with a #15 scalpel blade.  The skin margins were undermined to an appropriate distance in all directions utilizing iris scissors. Orbicularis Oris Muscle Flap Text: The defect edges were debeveled with a #15 scalpel blade.  Given that the defect affected the competency of the oral sphincter an obicularis oris muscle flap was deemed most appropriate to restore this competency and normal muscle function.  Using a sterile surgical marker, an appropriate flap was drawn incorporating the defect. The area thus outlined was incised with a #15 scalpel blade. Non-Graft Cartilage Fenestration Text: The cartilage was fenestrated with a 2mm punch biopsy to help facilitate healing. Tarsorrhaphy Text: A tarsorrhaphy was performed using Frost sutures. Modified Advancement Flap Text: The defect edges were debeveled with a #15 scalpel blade.  Given the location of the defect, shape of the defect and the proximity to free margins a modified advancement flap was deemed most appropriate.  Using a sterile surgical marker, an appropriate advancement flap was drawn incorporating the defect and placing the expected incisions within the relaxed skin tension lines where possible.    The area thus outlined was incised deep to adipose tissue with a #15 scalpel blade.  The skin margins were undermined to an appropriate distance in all directions utilizing iris scissors. Mart-1 - Positive Histology Text: MART-1 staining demonstrates areas of higher density and clustering of melanocytes with Pagetoid spread upwards within the epidermis. The surgical margins are positive for tumor cells. Crescentic Advancement Flap Text: The defect edges were debeveled with a #15 scalpel blade.  Given the location of the defect and the proximity to free margins a crescentic advancement flap was deemed most appropriate.  Using a sterile surgical marker, the appropriate advancement flap was drawn incorporating the defect and placing the expected incisions within the relaxed skin tension lines where possible.    The area thus outlined was incised deep to adipose tissue with a #15 scalpel blade.  The skin margins were undermined to an appropriate distance in all directions utilizing iris scissors. X Size Of Lesion In Cm (Optional): 0.8 No Repair - Repaired With Adjacent Surgical Defect Text (Leave Blank If You Do Not Want): After obtaining clear surgical margins the defect was repaired concurrently with another surgical defect which was in close approximation. Epidermal Closure: simple interrupted Retention Suture Bite Size: 3 mm Alar Island Pedicle Flap Text: The defect edges were debeveled with a #15 scalpel blade.  Given the location of the defect, shape of the defect and the proximity to the alar rim an island pedicle advancement flap was deemed most appropriate.  Using a sterile surgical marker, an appropriate advancement flap was drawn incorporating the defect, outlining the appropriate donor tissue and placing the expected incisions within the nasal ala running parallel to the alar rim. The area thus outlined was incised with a #15 scalpel blade.  The skin margins were undermined minimally to an appropriate distance in all directions around the primary defect and laterally outward around the island pedicle utilizing iris scissors.  There was minimal undermining beneath the pedicle flap. Debridement Text: The wound edges were debrided prior to proceeding with the closure to facilitate wound healing. Subsequent Stages Histo Method Verbiage: Using a similar technique to that described above, a thin layer of tissue was removed from all areas where tumor was visible on the previous stage.  The tissue was again oriented, mapped, dyed, and processed as above. Cheek Interpolation Flap Text: A decision was made to reconstruct the defect utilizing an interpolation axial flap and a staged reconstruction.  A telfa template was made of the defect.  This telfa template was then used to outline the Cheek Interpolation flap.  The donor area for the pedicle flap was then injected with anesthesia.  The flap was excised through the skin and subcutaneous tissue down to the layer of the underlying musculature.  The interpolation flap was carefully excised within this deep plane to maintain its blood supply.  The edges of the donor site were undermined.   The donor site was closed in a primary fashion.  The pedicle was then rotated into position and sutured.  Once the tube was sutured into place, adequate blood supply was confirmed with blanching and refill.  The pedicle was then wrapped with xeroform gauze and dressed appropriately with a telfa and gauze bandage to ensure continued blood supply and protect the attached pedicle. Otolaryngologist Procedure Text (F): After obtaining clear surgical margins the patient was sent to otolaryngology for surgical repair.  The patient understands they will receive post-surgical care and follow-up from the referring physician's office. Surgeon/Pathologist Verbiage (Will Incorporate Name Of Surgeon From Intro If Not Blank): operated in two distinct and integrated capacities as the surgeon and pathologist. Graft Donor Site Dermal Sutures (Optional): 5-0 Vicryl Retention Suture Text: Retention sutures were placed to support the closure and prevent dehiscence. V-Y Plasty Text: The defect edges were debeveled with a #15 scalpel blade.  Given the location of the defect, shape of the defect and the proximity to free margins an V-Y advancement flap was deemed most appropriate.  Using a sterile surgical marker, an appropriate advancement flap was drawn incorporating the defect and placing the expected incisions within the relaxed skin tension lines where possible.    The area thus outlined was incised deep to adipose tissue with a #15 scalpel blade.  The skin margins were undermined to an appropriate distance in all directions utilizing iris scissors. Suturegard Body: The suture ends were repeatedly re-tightened and re-clamped to achieve the desired tissue expansion. Hemigard Retention Suture: 2-0 Nylon Epidermal Sutures: mastisol and steri-strips Brow Lift Text: A midfrontal incision was made medially to the defect to allow access to the tissues just superior to the left eyebrow. Following careful dissection inferiorly in a supraperiosteal plane to the level of the left eyebrow, several 3-0 monocryl sutures were used to resuspend the eyebrow orbicularis oculi muscular unit to the superior frontal bone periosteum. This resulted in an appropriate reapproximation of static eyebrow symmetry and correction of the left brow ptosis. Asc Procedure Text (A): After obtaining clear surgical margins the patient was sent to an ASC for surgical repair.  The patient understands they will receive post-surgical care and follow-up from the ASC physician. Xenograft Text: The defect edges were debeveled with a #15 scalpel blade.  Given the location of the defect, shape of the defect and the proximity to free margins a xenograft was deemed most appropriate.  The graft was then trimmed to fit the size of the defect.  The graft was then placed in the primary defect and oriented appropriately. Epidermal Autograft Text: The defect edges were debeveled with a #15 scalpel blade.  Given the location of the defect, shape of the defect and the proximity to free margins an epidermal autograft was deemed most appropriate.  Using a sterile surgical marker, the primary defect shape was transferred to the donor site. The epidermal graft was then harvested.  The skin graft was then placed in the primary defect and oriented appropriately. Z Plasty Text: The lesion was extirpated to the level of the fat with a #15 scalpel blade.  Given the location of the defect, shape of the defect and the proximity to free margins a Z-plasty was deemed most appropriate for repair.  Using a sterile surgical marker, the appropriate transposition arms of the Z-plasty were drawn incorporating the defect and placing the expected incisions within the relaxed skin tension lines where possible.    The area thus outlined was incised deep to adipose tissue with a #15 scalpel blade.  The skin margins were undermined to an appropriate distance in all directions utilizing iris scissors.  The opposing transposition arms were then transposed into place in opposite direction and anchored with interrupted buried subcutaneous sutures. Stage 1: Number Of Blocks?: 1 Consent (Lip)/Introductory Paragraph: The rationale for Mohs was explained to the patient and consent was obtained. The risks, benefits and alternatives to therapy were discussed in detail. Specifically, the risks of lip deformity, changes in the oral aperture, infection, scarring, bleeding, prolonged wound healing, incomplete removal, allergy to anesthesia, nerve injury and recurrence were addressed. Prior to the procedure, the treatment site was clearly identified and confirmed by the patient. All components of Universal Protocol/PAUSE Rule completed. Tissue Cultured Epidermal Autograft Text: The defect edges were debeveled with a #15 scalpel blade.  Given the location of the defect, shape of the defect and the proximity to free margins a tissue cultured epidermal autograft was deemed most appropriate.  The graft was then trimmed to fit the size of the defect.  The graft was then placed in the primary defect and oriented appropriately. O-T Plasty Text: The defect edges were debeveled with a #15 scalpel blade.  Given the location of the defect, shape of the defect and the proximity to free margins an O-T plasty was deemed most appropriate.  Using a sterile surgical marker, an appropriate O-T plasty was drawn incorporating the defect and placing the expected incisions within the relaxed skin tension lines where possible.    The area thus outlined was incised deep to adipose tissue with a #15 scalpel blade.  The skin margins were undermined to an appropriate distance in all directions utilizing iris scissors. Localized Dermabrasion With Wire Brush Text: The patient was draped in routine manner.  Localized dermabrasion using 3 x 17 mm wire brush was performed in routine manner to papillary dermis. This spot dermabrasion is being performed to complete skin cancer reconstruction. It also will eliminate the other sun damaged precancerous cells that are known to be part of the regional effect of a lifetime's worth of sun exposure. This localized dermabrasion is therapeutic and should not be considered cosmetic in any regard. Abbe Flap (Lower To Upper Lip) Text: The defect of the upper lip was assessed and measured.  Given the location and size of the defect, an Abbe flap was deemed most appropriate.  Using a sterile surgical marker, an appropriate Abbe flap was measured and drawn on the lower lip. Local anesthesia was then infiltrated. A scalpel was then used to incise the upper lip through and through the skin, vermilion, muscle and mucosa, leaving the flap pedicled on the opposite side.  The flap was then rotated and transferred to the lower lip defect.  The flap was then sutured into place with a three layer technique, closing the orbicularis oris muscle layer with subcutaneous buried sutures, followed by a mucosal layer and an epidermal layer. Initial Size Of Lesion: 0.9 Rhomboid Transposition Flap Text: The defect edges were debeveled with a #15 scalpel blade.  Given the location of the defect and the proximity to free margins a rhomboid transposition flap was deemed most appropriate.  Using a sterile surgical marker, an appropriate rhomboid flap was drawn incorporating the defect.    The area thus outlined was incised deep to adipose tissue with a #15 scalpel blade.  The skin margins were undermined to an appropriate distance in all directions utilizing iris scissors. Wound Care (No Sutures): Petrolatum Double M-Plasty Complex Repair Preamble Text (Leave Blank If You Do Not Want): Extensive wide undermining was performed. Deep Sutures: 3-0 Vicryl Information: Selecting Yes will display possible errors in your note based on the variables you have selected. This validation is only offered as a suggestion for you. PLEASE NOTE THAT THE VALIDATION TEXT WILL BE REMOVED WHEN YOU FINALIZE YOUR NOTE. IF YOU WANT TO FAX A PRELIMINARY NOTE YOU WILL NEED TO TOGGLE THIS TO 'NO' IF YOU DO NOT WANT IT IN YOUR FAXED NOTE. Dermal Autograft Text: The defect edges were debeveled with a #15 scalpel blade.  Given the location of the defect, shape of the defect and the proximity to free margins a dermal autograft was deemed most appropriate.  Using a sterile surgical marker, the primary defect shape was transferred to the donor site. The area thus outlined was incised deep to adipose tissue with a #15 scalpel blade.  The harvested graft was then trimmed of adipose and epidermal tissue until only dermis was left.  The skin graft was then placed in the primary defect and oriented appropriately. Estlander Flap (Lower To Upper Lip) Text: The defect of the lower lip was assessed and measured.  Given the location and size of the defect, an Estlander flap was deemed most appropriate.  Using a sterile surgical marker, an appropriate Estlander flap was measured and drawn on the upper lip. Local anesthesia was then infiltrated. A scalpel was then used to incise the lateral aspect of the flap, through skin, muscle and mucosa, leaving the flap pedicled medially.  The flap was then rotated and positioned to fill the lower lip defect.  The flap was then sutured into place with a three layer technique, closing the orbicularis oris muscle layer with subcutaneous buried sutures, followed by a mucosal layer and an epidermal layer. Detail Level: Detailed Primary Defect Length In Cm (Final Defect Size - Required For Flaps/Grafts): 1.3 Burow's Graft Text: The defect edges were debeveled with a #15 scalpel blade.  Given the location of the defect, shape of the defect, the proximity to free margins and the presence of a standing cone deformity a Burow's skin graft was deemed most appropriate. The standing cone was removed and this tissue was then trimmed to the shape of the primary defect. The adipose tissue was also removed until only dermis and epidermis were left.  The skin margins of the secondary defect were undermined to an appropriate distance in all directions utilizing iris scissors.  The secondary defect was closed with interrupted buried subcutaneous sutures.  The skin edges were then re-apposed with running  sutures.  The skin graft was then placed in the primary defect and oriented appropriately. Dressing (No Sutures): dry sterile dressing Partial Purse String (Simple) Text: Given the location of the defect and the characteristics of the surrounding skin a simple purse string closure was deemed most appropriate.  Undermining was performed circumfirentially around the surgical defect.  A purse string suture was then placed and tightened. Wound tension only allowed a partial closure of the circular defect. Mohs Case Number: [id number] Cheek-To-Nose Interpolation Flap Text: A decision was made to reconstruct the defect utilizing an interpolation axial flap and a staged reconstruction.  A telfa template was made of the defect.  This telfa template was then used to outline the Cheek-To-Nose Interpolation flap.  The donor area for the pedicle flap was then injected with anesthesia.  The flap was excised through the skin and subcutaneous tissue down to the layer of the underlying musculature.  The interpolation flap was carefully excised within this deep plane to maintain its blood supply.  The edges of the donor site were undermined.   The donor site was closed in a primary fashion.  The pedicle was then rotated into position and sutured.  Once the tube was sutured into place, adequate blood supply was confirmed with blanching and refill.  The pedicle was then wrapped with xeroform gauze and dressed appropriately with a telfa and gauze bandage to ensure continued blood supply and protect the attached pedicle. Tumor Debulked?: curette Complex Repair And Graft Additional Text (Will Appearing After The Standard Complex Repair Text): The complex repair was not sufficient to completely close the primary defect. The remaining additional defect was repaired with the graft mentioned below. O-Z Flap Text: The defect edges were debeveled with a #15 scalpel blade.  Given the location of the defect, shape of the defect and the proximity to free margins an O-Z flap was deemed most appropriate.  Using a sterile surgical marker, an appropriate transposition flap was drawn incorporating the defect and placing the expected incisions within the relaxed skin tension lines where possible. The area thus outlined was incised deep to adipose tissue with a #15 scalpel blade.  The skin margins were undermined to an appropriate distance in all directions utilizing iris scissors. Surgeon: Milton Hurst Dorsal Nasal Flap Text: The defect edges were debeveled with a #15 scalpel blade.  Given the location of the defect and the proximity to free margins a dorsal nasal flap was deemed most appropriate.  Using a sterile surgical marker, an appropriate dorsal nasal flap was drawn around the defect.    The area thus outlined was incised deep to adipose tissue with a #15 scalpel blade.  The skin margins were undermined to an appropriate distance in all directions utilizing iris scissors. Length To Time In Minutes Device Was In Place: 10 Skin Substitute Text: The defect edges were debeveled with a #15 scalpel blade.  Given the location of the defect, shape of the defect and the proximity to free margins a skin substitute graft was deemed most appropriate.  The graft material was trimmed to fit the size of the defect. The graft was then placed in the primary defect and oriented appropriately. Muscle Hinge Flap Text: The defect edges were debeveled with a #15 scalpel blade.  Given the size, depth and location of the defect and the proximity to free margins a muscle hinge flap was deemed most appropriate.  Using a sterile surgical marker, an appropriate hinge flap was drawn incorporating the defect. The area thus outlined was incised with a #15 scalpel blade.  The skin margins were undermined to an appropriate distance in all directions utilizing iris scissors. Helical Rim Text: The closure involved the helical rim. Spiral Flap Text: The defect edges were debeveled with a #15 scalpel blade.  Given the location of the defect, shape of the defect and the proximity to free margins a spiral flap was deemed most appropriate.  Using a sterile surgical marker, an appropriate rotation flap was drawn incorporating the defect and placing the expected incisions within the relaxed skin tension lines where possible. The area thus outlined was incised deep to adipose tissue with a #15 scalpel blade.  The skin margins were undermined to an appropriate distance in all directions utilizing iris scissors. Mart-1 - Negative Histology Text: MART-1 staining demonstrates a normal density and pattern of melanocytes along the dermal-epidermal junction. The surgical margins are negative for tumor cells. Scc Well Differentiated Histology Text: Atypical squamous epithelial cells Ear Star Wedge Flap Text: The defect edges were debeveled with a #15 blade scalpel.  Given the location of the defect and the proximity to free margins (helical rim) an ear star wedge flap was deemed most appropriate.  Using a sterile surgical marker, the appropriate flap was drawn incorporating the defect and placing the expected incisions between the helical rim and antihelix where possible.  The area thus outlined was incised through and through with a #15 scalpel blade. Keystone Flap Text: The defect edges were debeveled with a #15 scalpel blade.  Given the location of the defect, shape of the defect a keystone flap was deemed most appropriate.  Using a sterile surgical marker, an appropriate keystone flap was drawn incorporating the defect, outlining the appropriate donor tissue and placing the expected incisions within the relaxed skin tension lines where possible. The area thus outlined was incised deep to adipose tissue with a #15 scalpel blade.  The skin margins were undermined to an appropriate distance in all directions around the primary defect and laterally outward around the flap utilizing iris scissors. Mercedes Flap Text: The defect edges were debeveled with a #15 scalpel blade.  Given the location of the defect, shape of the defect and the proximity to free margins a Mercedes flap was deemed most appropriate.  Using a sterile surgical marker, an appropriate advancement flap was drawn incorporating the defect and placing the expected incisions within the relaxed skin tension lines where possible. The area thus outlined was incised deep to adipose tissue with a #15 scalpel blade.  The skin margins were undermined to an appropriate distance in all directions utilizing iris scissors. Abbe Flap (Upper To Lower Lip) Text: The defect of the lower lip was assessed and measured.  Given the location and size of the defect, an Abbe flap was deemed most appropriate.  Using a sterile surgical marker, an appropriate Abbe flap was measured and drawn on the upper lip. Local anesthesia was then infiltrated.  A scalpel was then used to incise the upper lip through and through the skin, vermilion, muscle and mucosa, leaving the flap pedicled on the opposite side.  The flap was then rotated and transferred to the lower lip defect.  The flap was then sutured into place with a three layer technique, closing the orbicularis oris muscle layer with subcutaneous buried sutures, followed by a mucosal layer and an epidermal layer. Undermining Type: Entire Wound Island Pedicle Flap Text: The defect edges were debeveled with a #15 scalpel blade.  Given the location of the defect, shape of the defect and the proximity to free margins an island pedicle advancement flap was deemed most appropriate.  Using a sterile surgical marker, an appropriate advancement flap was drawn incorporating the defect, outlining the appropriate donor tissue and placing the expected incisions within the relaxed skin tension lines where possible.    The area thus outlined was incised deep to adipose tissue with a #15 scalpel blade.  The skin margins were undermined to an appropriate distance in all directions around the primary defect and laterally outward around the island pedicle utilizing iris scissors.  There was minimal undermining beneath the pedicle flap. Consent (Marginal Mandibular)/Introductory Paragraph: The rationale for Mohs was explained to the patient and consent was obtained. The risks, benefits and alternatives to therapy were discussed in detail. Specifically, the risks of damage to the marginal mandibular branch of the facial nerve, infection, scarring, bleeding, prolonged wound healing, incomplete removal, allergy to anesthesia, and recurrence were addressed. Prior to the procedure, the treatment site was clearly identified and confirmed by the patient. All components of Universal Protocol/PAUSE Rule completed. Nasal Turnover Hinge Flap Text: The defect edges were debeveled with a #15 scalpel blade.  Given the size, depth, location of the defect and the defect being full thickness a nasal turnover hinge flap was deemed most appropriate.  Using a sterile surgical marker, an appropriate hinge flap was drawn incorporating the defect. The area thus outlined was incised with a #15 scalpel blade. The flap was designed to recreate the nasal mucosal lining and the alar rim. The skin margins were undermined to an appropriate distance in all directions utilizing iris scissors. Double O-Z Flap Text: The defect edges were debeveled with a #15 scalpel blade.  Given the location of the defect, shape of the defect and the proximity to free margins a Double O-Z flap was deemed most appropriate.  Using a sterile surgical marker, an appropriate transposition flap was drawn incorporating the defect and placing the expected incisions within the relaxed skin tension lines where possible. The area thus outlined was incised deep to adipose tissue with a #15 scalpel blade.  The skin margins were undermined to an appropriate distance in all directions utilizing iris scissors. Manual Repair Warning Statement: We plan on removing the manually selected variable below in favor of our much easier automatic structured text blocks found in the previous tab. We decided to do this to help make the flow better and give you the full power of structured data. Manual selection is never going to be ideal in our platform and I would encourage you to avoid using manual selection from this point on, especially since I will be sunsetting this feature. It is important that you do one of two things with the customized text below. First, you can save all of the text in a word file so you can have it for future reference. Second, transfer the text to the appropriate area in the Library tab. Lastly, if there is a flap or graft type which we do not have you need to let us know right away so I can add it in before the variable is hidden. No need to panic, we plan to give you roughly 6 months to make the change. Mucosal Advancement Flap Text: Given the location of the defect, shape of the defect and the proximity to free margins a mucosal advancement flap was deemed most appropriate. Incisions were made with a 15 blade scalpel in the appropriate fashion along the cutaneous vermilion border and the mucosal lip. The remaining actinically damaged mucosal tissue was excised.  The mucosal advancement flap was then elevated to the gingival sulcus with care taken to preserve the neurovascular structures and advanced into the primary defect. Care was taken to ensure that precise realignment of the vermilion border was achieved. A-T Advancement Flap Text: The defect edges were debeveled with a #15 scalpel blade.  Given the location of the defect, shape of the defect and the proximity to free margins an A-T advancement flap was deemed most appropriate.  Using a sterile surgical marker, an appropriate advancement flap was drawn incorporating the defect and placing the expected incisions within the relaxed skin tension lines where possible.    The area thus outlined was incised deep to adipose tissue with a #15 scalpel blade.  The skin margins were undermined to an appropriate distance in all directions utilizing iris scissors. Bi-Rhombic Flap Text: The defect edges were debeveled with a #15 scalpel blade.  Given the location of the defect and the proximity to free margins a bi-rhombic flap was deemed most appropriate.  Using a sterile surgical marker, an appropriate rhombic flap was drawn incorporating the defect. The area thus outlined was incised deep to adipose tissue with a #15 scalpel blade.  The skin margins were undermined to an appropriate distance in all directions utilizing iris scissors. Simple / Intermediate / Complex Repair - Final Wound Length In Cm: 3.6 Pinch Graft Text: The defect edges were debeveled with a #15 scalpel blade. Given the location of the defect, shape of the defect and the proximity to free margins a pinch graft was deemed most appropriate. Using a sterile surgical marker, the primary defect shape was transferred to the donor site. The area thus outlined was incised deep to adipose tissue with a #15 scalpel blade.  The harvested graft was then trimmed of adipose tissue until only dermis and epidermis was left. The skin margins of the secondary defect were undermined to an appropriate distance in all directions utilizing iris scissors.  The secondary defect was closed with interrupted buried subcutaneous sutures.  The skin edges were then re-apposed with running  sutures.  The skin graft was then placed in the primary defect and oriented appropriately. Double Island Pedicle Flap Text: The defect edges were debeveled with a #15 scalpel blade.  Given the location of the defect, shape of the defect and the proximity to free margins a double island pedicle advancement flap was deemed most appropriate.  Using a sterile surgical marker, an appropriate advancement flap was drawn incorporating the defect, outlining the appropriate donor tissue and placing the expected incisions within the relaxed skin tension lines where possible.    The area thus outlined was incised deep to adipose tissue with a #15 scalpel blade.  The skin margins were undermined to an appropriate distance in all directions around the primary defect and laterally outward around the island pedicle utilizing iris scissors.  There was minimal undermining beneath the pedicle flap. Rotation Flap Text: The defect edges were debeveled with a #15 scalpel blade.  Given the location of the defect, shape of the defect and the proximity to free margins a rotation flap was deemed most appropriate.  Using a sterile surgical marker, an appropriate rotation flap was drawn incorporating the defect and placing the expected incisions within the relaxed skin tension lines where possible.    The area thus outlined was incised deep to adipose tissue with a #15 scalpel blade.  The skin margins were undermined to an appropriate distance in all directions utilizing iris scissors. Medical Necessity Statement: Based on my medical judgement, Mohs surgery is the most appropriate treatment for this cancer compared to other treatments. W Plasty Text: The lesion was extirpated to the level of the fat with a #15 scalpel blade.  Given the location of the defect, shape of the defect and the proximity to free margins a W-plasty was deemed most appropriate for repair.  Using a sterile surgical marker, the appropriate transposition arms of the W-plasty were drawn incorporating the defect and placing the expected incisions within the relaxed skin tension lines where possible.    The area thus outlined was incised deep to adipose tissue with a #15 scalpel blade.  The skin margins were undermined to an appropriate distance in all directions utilizing iris scissors.  The opposing transposition arms were then transposed into place in opposite direction and anchored with interrupted buried subcutaneous sutures. Width Of Defect Perpendicular To Closure In Cm (Required): 1.2 No Residual Tumor Seen Histology Text: There were no malignant cells seen in the sections examined. Mastoid Interpolation Flap Text: A decision was made to reconstruct the defect utilizing an interpolation axial flap and a staged reconstruction.  A telfa template was made of the defect.  This telfa template was then used to outline the mastoid interpolation flap.  The donor area for the pedicle flap was then injected with anesthesia.  The flap was excised through the skin and subcutaneous tissue down to the layer of the underlying musculature.  The pedicle flap was carefully excised within this deep plane to maintain its blood supply.  The edges of the donor site were undermined.   The donor site was closed in a primary fashion.  The pedicle was then rotated into position and sutured.  Once the tube was sutured into place, adequate blood supply was confirmed with blanching and refill.  The pedicle was then wrapped with xeroform gauze and dressed appropriately with a telfa and gauze bandage to ensure continued blood supply and protect the attached pedicle. Nasalis-Muscle-Based Myocutaneous Island Pedicle Flap Text: Using a #15 blade, an incision was made around the donor flap to the level of the nasalis muscle. Wide lateral undermining was then performed in both the subcutaneous plane above the nasalis muscle, and in a submuscular plane just above periosteum. This allowed the formation of a free nasalis muscle axial pedicle (based on the angular artery) which was still attached to the actual cutaneous flap, increasing its mobility and vascular viability. Hemostasis was obtained with pinpoint electrocoagulation. The flap was mobilized into position and the pivotal anchor points positioned and stabilized with buried interrupted sutures. Subcutaneous and dermal tissues were closed in a multilayered fashion with sutures. Tissue redundancies were excised, and the epidermal edges were apposed without significant tension and sutured with sutures. Trilobed Flap Text: The defect edges were debeveled with a #15 scalpel blade.  Given the location of the defect and the proximity to free margins a trilobed flap was deemed most appropriate.  Using a sterile surgical marker, an appropriate trilobed flap drawn around the defect.    The area thus outlined was incised deep to adipose tissue with a #15 scalpel blade.  The skin margins were undermined to an appropriate distance in all directions utilizing iris scissors. Cartilage Graft Text: The defect edges were debeveled with a #15 scalpel blade.  Given the location of the defect, shape of the defect, the fact the defect involved a full thickness cartilage defect a cartilage graft was deemed most appropriate.  An appropriate donor site was identified, cleansed, and anesthetized. The cartilage graft was then harvested and transferred to the recipient site, oriented appropriately and then sutured into place.  The secondary defect was then repaired using a primary closure. O-T Advancement Flap Text: The defect edges were debeveled with a #15 scalpel blade.  Given the location of the defect, shape of the defect and the proximity to free margins an O-T advancement flap was deemed most appropriate.  Using a sterile surgical marker, an appropriate advancement flap was drawn incorporating the defect and placing the expected incisions within the relaxed skin tension lines where possible.    The area thus outlined was incised deep to adipose tissue with a #15 scalpel blade.  The skin margins were undermined to an appropriate distance in all directions utilizing iris scissors. Bilobed Transposition Flap Text: The defect edges were debeveled with a #15 scalpel blade.  Given the location of the defect and the proximity to free margins a bilobed transposition flap was deemed most appropriate.  Using a sterile surgical marker, an appropriate bilobe flap drawn around the defect.    The area thus outlined was incised deep to adipose tissue with a #15 scalpel blade.  The skin margins were undermined to an appropriate distance in all directions utilizing iris scissors. Mohs Method Verbiage: An incision at a 45 degree angle following the standard Mohs approach was done and the specimen was harvested as a microscopic controlled layer. Paramedian Forehead Flap Text: A decision was made to reconstruct the defect utilizing an interpolation axial flap and a staged reconstruction.  A telfa template was made of the defect.  This telfa template was then used to outline the paramedian forehead pedicle flap.  The donor area for the pedicle flap was then injected with anesthesia.  The flap was excised through the skin and subcutaneous tissue down to the layer of the underlying musculature.  The pedicle flap was carefully excised within this deep plane to maintain its blood supply.  The edges of the donor site were undermined.   The donor site was closed in a primary fashion.  The pedicle was then rotated into position and sutured.  Once the tube was sutured into place, adequate blood supply was confirmed with blanching and refill.  The pedicle was then wrapped with xeroform gauze and dressed appropriately with a telfa and gauze bandage to ensure continued blood supply and protect the attached pedicle. Burow's Advancement Flap Text: The defect edges were debeveled with a #15 scalpel blade.  Given the location of the defect and the proximity to free margins a Burow's advancement flap was deemed most appropriate.  Using a sterile surgical marker, the appropriate advancement flap was drawn incorporating the defect and placing the expected incisions within the relaxed skin tension lines where possible.    The area thus outlined was incised deep to adipose tissue with a #15 scalpel blade.  The skin margins were undermined to an appropriate distance in all directions utilizing iris scissors. Where Do You Want The Question To Include Opioid Counseling Located?: Case Summary Tab Graft Cartilage Fenestration Text: The cartilage was fenestrated with a 2mm punch biopsy to help facilitate graft survival and healing. Advancement-Rotation Flap Text: The defect edges were debeveled with a #15 scalpel blade.  Given the location of the defect, shape of the defect and the proximity to free margins an advancement-rotation flap was deemed most appropriate.  Using a sterile surgical marker, an appropriate flap was drawn incorporating the defect and placing the expected incisions within the relaxed skin tension lines where possible. The area thus outlined was incised deep to adipose tissue with a #15 scalpel blade.  The skin margins were undermined to an appropriate distance in all directions utilizing iris scissors. Island Pedicle Flap-Requiring Vessel Identification Text: The defect edges were debeveled with a #15 scalpel blade.  Given the location of the defect, shape of the defect and the proximity to free margins an island pedicle advancement flap was deemed most appropriate.  Using a sterile surgical marker, an appropriate advancement flap was drawn, based on the axial vessel mentioned above, incorporating the defect, outlining the appropriate donor tissue and placing the expected incisions within the relaxed skin tension lines where possible.    The area thus outlined was incised deep to adipose tissue with a #15 scalpel blade.  The skin margins were undermined to an appropriate distance in all directions around the primary defect and laterally outward around the island pedicle utilizing iris scissors.  There was minimal undermining beneath the pedicle flap. Additional Anesthesia Volume In Cc: 6 Home Suture Removal Text: Patient was provided instructions on removing sutures and will remove their sutures at home.  If they have any questions or difficulties they will call the office. Consent (Temporal Branch)/Introductory Paragraph: The rationale for Mohs was explained to the patient and consent was obtained. The risks, benefits and alternatives to therapy were discussed in detail. Specifically, the risks of damage to the temporal branch of the facial nerve, infection, scarring, bleeding, prolonged wound healing, incomplete removal, allergy to anesthesia, and recurrence were addressed. Prior to the procedure, the treatment site was clearly identified and confirmed by the patient. All components of Universal Protocol/PAUSE Rule completed. Mustarde Flap Text: The defect edges were debeveled with a #15 scalpel blade.  Given the size, depth and location of the defect and the proximity to free margins a Mustarde flap was deemed most appropriate.  Using a sterile surgical marker, an appropriate flap was drawn incorporating the defect. The area thus outlined was incised with a #15 scalpel blade.  The skin margins were undermined to an appropriate distance in all directions utilizing iris scissors. O-L Flap Text: The defect edges were debeveled with a #15 scalpel blade.  Given the location of the defect, shape of the defect and the proximity to free margins an O-L flap was deemed most appropriate.  Using a sterile surgical marker, an appropriate advancement flap was drawn incorporating the defect and placing the expected incisions within the relaxed skin tension lines where possible.    The area thus outlined was incised deep to adipose tissue with a #15 scalpel blade.  The skin margins were undermined to an appropriate distance in all directions utilizing iris scissors. Mauc Instructions: By selecting yes to the question below the MAUC number will be added into the note.  This will be calculated automatically based on the diagnosis chosen, the size entered, the body zone selected (H,M,L) and the specific indications you chose. You will also have the option to override the Mohs AUC if you disagree with the automatically calculated number and this option is found in the Case Summary tab. Area L Indication Text: Tumors in this location are included in Area L (trunk and extremities).  Mohs surgery is indicated for larger tumors, or tumors with aggressive histologic features, in these anatomic locations. Suture Removal: 7 days Repair Type: Complex Repair Repair Anesthesia Method: local infiltration Same Histology In Subsequent Stages Text: The pattern and morphology of the tumor is as described in the first stage. Split-Thickness Skin Graft Text: The defect edges were debeveled with a #15 scalpel blade.  Given the location of the defect, shape of the defect and the proximity to free margins a split thickness skin graft was deemed most appropriate.  Using a sterile surgical marker, the primary defect shape was transferred to the donor site. The split thickness graft was then harvested.  The skin graft was then placed in the primary defect and oriented appropriately. Zygomaticofacial Flap Text: Given the location of the defect, shape of the defect and the proximity to free margins a zygomaticofacial flap was deemed most appropriate for repair.  Using a sterile surgical marker, the appropriate flap was drawn incorporating the defect and placing the expected incisions within the relaxed skin tension lines where possible. The area thus outlined was incised deep to adipose tissue with a #15 scalpel blade with preservation of a vascular pedicle.  The skin margins were undermined to an appropriate distance in all directions utilizing iris scissors.  The flap was then placed into the defect and anchored with interrupted buried subcutaneous sutures. Donor Site Anesthesia Type: same as repair anesthesia Consent (Scalp)/Introductory Paragraph: The rationale for Mohs was explained to the patient and consent was obtained. The risks, benefits and alternatives to therapy were discussed in detail. Specifically, the risks of changes in hair growth pattern secondary to repair, infection, scarring, bleeding, prolonged wound healing, incomplete removal, allergy to anesthesia, nerve injury and recurrence were addressed. Prior to the procedure, the treatment site was clearly identified and confirmed by the patient. All components of Universal Protocol/PAUSE Rule completed. Melolabial Interpolation Flap Text: A decision was made to reconstruct the defect utilizing an interpolation axial flap and a staged reconstruction.  A telfa template was made of the defect.  This telfa template was then used to outline the melolabial interpolation flap.  The donor area for the pedicle flap was then injected with anesthesia.  The flap was excised through the skin and subcutaneous tissue down to the layer of the underlying musculature.  The pedicle flap was carefully excised within this deep plane to maintain its blood supply.  The edges of the donor site were undermined.   The donor site was closed in a primary fashion.  The pedicle was then rotated into position and sutured.  Once the tube was sutured into place, adequate blood supply was confirmed with blanching and refill.  The pedicle was then wrapped with xeroform gauze and dressed appropriately with a telfa and gauze bandage to ensure continued blood supply and protect the attached pedicle. Adjacent Tissue Transfer Text: The defect edges were debeveled with a #15 scalpel blade.  Given the location of the defect and the proximity to free margins an adjacent tissue transfer was deemed most appropriate.  Using a sterile surgical marker, an appropriate flap was drawn incorporating the defect and placing the expected incisions within the relaxed skin tension lines where possible.    The area thus outlined was incised deep to adipose tissue with a #15 scalpel blade.  The skin margins were undermined to an appropriate distance in all directions utilizing iris scissors. Plastic Surgeon (A): Aris Beckman MD Eye Protection Verbiage: Before proceeding with the stage, a plastic scleral shield was inserted. The globe was anesthetized with a few drops of 1% lidocaine with 1:100,000 epinephrine. Then, an appropriate sized scleral shield was chosen and coated with lacrilube ointment. The shield was gently inserted and left in place for the duration of each stage. After the stage was completed, the shield was gently removed. Double O-Z Plasty Text: The defect edges were debeveled with a #15 scalpel blade.  Given the location of the defect, shape of the defect and the proximity to free margins a Double O-Z plasty (double transposition flap) was deemed most appropriate.  Using a sterile surgical marker, the appropriate transposition flaps were drawn incorporating the defect and placing the expected incisions within the relaxed skin tension lines where possible. The area thus outlined was incised deep to adipose tissue with a #15 scalpel blade.  The skin margins were undermined to an appropriate distance in all directions utilizing iris scissors.  Hemostasis was achieved with electrocautery.  The flaps were then transposed into place, one clockwise and the other counterclockwise, and anchored with interrupted buried subcutaneous sutures. Consent Type: Consent 1 (Standard) Posterior Auricular Interpolation Flap Text: A decision was made to reconstruct the defect utilizing an interpolation axial flap and a staged reconstruction.  A telfa template was made of the defect.  This telfa template was then used to outline the posterior auricular interpolation flap.  The donor area for the pedicle flap was then injected with anesthesia.  The flap was excised through the skin and subcutaneous tissue down to the layer of the underlying musculature.  The pedicle flap was carefully excised within this deep plane to maintain its blood supply.  The edges of the donor site were undermined.   The donor site was closed in a primary fashion.  The pedicle was then rotated into position and sutured.  Once the tube was sutured into place, adequate blood supply was confirmed with blanching and refill.  The pedicle was then wrapped with xeroform gauze and dressed appropriately with a telfa and gauze bandage to ensure continued blood supply and protect the attached pedicle. Unna Boot Text: An Unna boot was placed to help immobilize the limb and facilitate more rapid healing. Full Thickness Lip Wedge Repair (Flap) Text: Given the location of the defect and the proximity to free margins a full thickness wedge repair was deemed most appropriate.  Using a sterile surgical marker, the appropriate repair was drawn incorporating the defect and placing the expected incisions perpendicular to the vermilion border.  The vermilion border was also meticulously outlined to ensure appropriate reapproximation during the repair.  The area thus outlined was incised through and through with a #15 scalpel blade.  The muscularis and dermis were reaproximated with deep sutures following hemostasis. Care was taken to realign the vermilion border before proceeding with the superficial closure.  Once the vermilion was realigned the superfical and mucosal closure was finished. Consent (Spinal Accessory)/Introductory Paragraph: The rationale for Mohs was explained to the patient and consent was obtained. The risks, benefits and alternatives to therapy were discussed in detail. Specifically, the risks of damage to the spinal accessory nerve, infection, scarring, bleeding, prolonged wound healing, incomplete removal, allergy to anesthesia, and recurrence were addressed. Prior to the procedure, the treatment site was clearly identified and confirmed by the patient. All components of Universal Protocol/PAUSE Rule completed. Hatchet Flap Text: The defect edges were debeveled with a #15 scalpel blade.  Given the location of the defect, shape of the defect and the proximity to free margins a hatchet flap was deemed most appropriate.  Using a sterile surgical marker, an appropriate hatchet flap was drawn incorporating the defect and placing the expected incisions within the relaxed skin tension lines where possible.    The area thus outlined was incised deep to adipose tissue with a #15 scalpel blade.  The skin margins were undermined to an appropriate distance in all directions utilizing iris scissors. Purse String (Intermediate) Text: Given the location of the defect and the characteristics of the surrounding skin a pursestring intermediate closure was deemed most appropriate.  Undermining was performed circumfirentially around the surgical defect.  A purstring suture was then placed and tightened. Pain Refusal Text: I offered to prescribe pain medication but the patient refused to take this medication. Complex Repair And Flap Additional Text (Will Appearing After The Standard Complex Repair Text): The complex repair was not sufficient to completely close the primary defect. The remaining additional defect was repaired with the flap mentioned below. Area M Indication Text: Tumors in this location are included in Area M (cheek, forehead, scalp, neck, jawline and pretibial skin).  Mohs surgery is indicated for tumors in these anatomic locations. Consent (Nose)/Introductory Paragraph: The rationale for Mohs was explained to the patient and consent was obtained. The risks, benefits and alternatives to therapy were discussed in detail. Specifically, the risks of nasal deformity, changes in the flow of air through the nose, infection, scarring, bleeding, prolonged wound healing, incomplete removal, allergy to anesthesia, nerve injury and recurrence were addressed. Prior to the procedure, the treatment site was clearly identified and confirmed by the patient. All components of Universal Protocol/PAUSE Rule completed. Bcc Histology Text: There were numerous aggregates of basaloid cells. Repair Hemostasis (Optional): Biterminal bipolar electrocoagulation Partial Purse String (Intermediate) Text: Given the location of the defect and the characteristics of the surrounding skin an intermediate purse string closure was deemed most appropriate.  Undermining was performed circumfirentially around the surgical defect.  A purse string suture was then placed and tightened. Wound tension only allowed a partial closure of the circular defect. Chonodrocutaneous Helical Advancement Flap Text: The defect edges were debeveled with a #15 scalpel blade.  Given the location of the defect and the proximity to free margins a chondrocutaneous helical advancement flap was deemed most appropriate.  Using a sterile surgical marker, the appropriate advancement flap was drawn incorporating the defect and placing the expected incisions within the relaxed skin tension lines where possible.    The area thus outlined was incised deep to adipose tissue with a #15 scalpel blade.  The skin margins were undermined to an appropriate distance in all directions utilizing iris scissors. Rhombic Flap Text: The defect edges were debeveled with a #15 scalpel blade.  Given the location of the defect and the proximity to free margins a rhombic flap was deemed most appropriate.  Using a sterile surgical marker, an appropriate rhombic flap was drawn incorporating the defect.    The area thus outlined was incised deep to adipose tissue with a #15 scalpel blade.  The skin margins were undermined to an appropriate distance in all directions utilizing iris scissors. Secondary Intention Text (Leave Blank If You Do Not Want): The defect will heal with secondary intention. Graft Basting Suture (Optional): 7-0 Vicryl Bilateral Helical Rim Advancement Flap Text: The defect edges were debeveled with a #15 blade scalpel.  Given the location of the defect and the proximity to free margins (helical rim) a bilateral helical rim advancement flap was deemed most appropriate.  Using a sterile surgical marker, the appropriate advancement flaps were drawn incorporating the defect and placing the expected incisions between the helical rim and antihelix where possible.  The area thus outlined was incised through and through with a #15 scalpel blade.  With a skin hook and iris scissors, the flaps were gently and sharply undermined and freed up. Area H Indication Text: Tumors in this location are included in Area H (eyelids, eyebrows, nose, lips, chin, ear, pre-auricular, post-auricular, temple, genitalia, hands, feet, ankles and areola).  Tissue conservation is critical in these anatomic locations. Bilateral Rotation Flap Text: The defect edges were debeveled with a #15 scalpel blade. Given the location of the defect, shape of the defect and the proximity to free margins a bilateral rotation flap was deemed most appropriate. Using a sterile surgical marker, an appropriate rotation flap was drawn incorporating the defect and placing the expected incisions within the relaxed skin tension lines where possible. The area thus outlined was incised deep to adipose tissue with a #15 scalpel blade. The skin margins were undermined to an appropriate distance in all directions utilizing iris scissors. Following this, the designed flap was carried over into the primary defect and sutured into place. Tumor Depth: Less than 6mm from granular layer and no invasion beyond the subcutaneous fat Surgeon Performing Repair (Optional): Milton Hurst MD Ftsg Text: The defect edges were debeveled with a #15 scalpel blade.  Given the location of the defect, shape of the defect and the proximity to free margins a full thickness skin graft was deemed most appropriate.  Using a sterile surgical marker, the primary defect shape was transferred to the donor site. The area thus outlined was incised deep to adipose tissue with a #15 scalpel blade.  The harvested graft was then trimmed of adipose tissue until only dermis and epidermis was left.  The skin margins of the secondary defect were undermined to an appropriate distance in all directions utilizing iris scissors.  The secondary defect was closed with interrupted buried subcutaneous sutures.  The skin edges were then re-apposed with running  sutures.  The skin graft was then placed in the primary defect and oriented appropriately. Interpolation Flap Text: A decision was made to reconstruct the defect utilizing an interpolation axial flap and a staged reconstruction.  A telfa template was made of the defect.  This telfa template was then used to outline the interpolation flap.  The donor area for the pedicle flap was then injected with anesthesia.  The flap was excised through the skin and subcutaneous tissue down to the layer of the underlying musculature.  The interpolation flap was carefully excised within this deep plane to maintain its blood supply.  The edges of the donor site were undermined.   The donor site was closed in a primary fashion.  The pedicle was then rotated into position and sutured.  Once the tube was sutured into place, adequate blood supply was confirmed with blanching and refill.  The pedicle was then wrapped with xeroform gauze and dressed appropriately with a telfa and gauze bandage to ensure continued blood supply and protect the attached pedicle. Wound Care: Bacitracin Hemigard Postcare Instructions: The HEMIGARD strips are to remain completely dry for at least 5-7 days. Anesthesia Volume In Cc: 3 O-Z Plasty Text: The defect edges were debeveled with a #15 scalpel blade.  Given the location of the defect, shape of the defect and the proximity to free margins an O-Z plasty (double transposition flap) was deemed most appropriate.  Using a sterile surgical marker, the appropriate transposition flaps were drawn incorporating the defect and placing the expected incisions within the relaxed skin tension lines where possible.    The area thus outlined was incised deep to adipose tissue with a #15 scalpel blade.  The skin margins were undermined to an appropriate distance in all directions utilizing iris scissors.  Hemostasis was achieved with electrocautery.  The flaps were then transposed into place, one clockwise and the other counterclockwise, and anchored with interrupted buried subcutaneous sutures. Suturegard Intro: Intraoperative tissue expansion was performed, utilizing the SUTUREGARD device, in order to reduce wound tension. Staging Info: By selecting yes to the question above you will include information on AJCC 8 tumor staging in your Mohs note. Information on tumor staging will be automatically added for SCCs on the head and neck. AJCC 8 includes tumor size, tumor depth, perineural involvement and bone invasion. Inflammation Suggestive Of Cancer Camouflage Histology Text: There was a dense lymphocytic infiltrate which prevented adequate histologic evaluation of adjacent structures. Consent 3/Introductory Paragraph: I gave the patient a chance to ask questions they had about the procedure.  Following this I explained the Mohs procedure and consent was obtained. The risks, benefits and alternatives to therapy were discussed in detail. Specifically, the risks of infection, scarring, bleeding, prolonged wound healing, incomplete removal, allergy to anesthesia, nerve injury and recurrence were addressed. Prior to the procedure, the treatment site was clearly identified and confirmed by the patient. All components of Universal Protocol/PAUSE Rule completed. Banner Transposition Flap Text: The defect edges were debeveled with a #15 scalpel blade.  Given the location of the defect and the proximity to free margins a Banner transposition flap was deemed most appropriate.  Using a sterile surgical marker, an appropriate flap drawn around the defect. The area thus outlined was incised deep to adipose tissue with a #15 scalpel blade.  The skin margins were undermined to an appropriate distance in all directions utilizing iris scissors. Composite Graft Text: The defect edges were debeveled with a #15 scalpel blade.  Given the location of the defect, shape of the defect, the proximity to free margins and the fact the defect was full thickness a composite graft was deemed most appropriate.  The defect was outline and then transferred to the donor site.  A full thickness graft was then excised from the donor site. The graft was then placed in the primary defect, oriented appropriately and then sutured into place.  The secondary defect was then repaired using a primary closure. Staged Advancement Flap Text: The defect edges were debeveled with a #15 scalpel blade.  Given the location of the defect, shape of the defect and the proximity to free margins a staged advancement flap was deemed most appropriate.  Using a sterile surgical marker, an appropriate advancement flap was drawn incorporating the defect and placing the expected incisions within the relaxed skin tension lines where possible. The area thus outlined was incised deep to adipose tissue with a #15 scalpel blade.  The skin margins were undermined to an appropriate distance in all directions utilizing iris scissors. Advancement Flap (Single) Text: The defect edges were debeveled with a #15 scalpel blade.  Given the location of the defect and the proximity to free margins a single advancement flap was deemed most appropriate.  Using a sterile surgical marker, an appropriate advancement flap was drawn incorporating the defect and placing the expected incisions within the relaxed skin tension lines where possible.    The area thus outlined was incised deep to adipose tissue with a #15 scalpel blade.  The skin margins were undermined to an appropriate distance in all directions utilizing iris scissors. Post-Care Instructions: I reviewed with the patient in detail post-care instructions. Patient is not to engage in any heavy lifting, exercise, or swimming for the next 14 days. Should the patient develop any fevers, chills, bleeding, severe pain patient will contact the office immediately. Postop Diagnosis: same V-Y Flap Text: The defect edges were debeveled with a #15 scalpel blade.  Given the location of the defect, shape of the defect and the proximity to free margins a V-Y flap was deemed most appropriate.  Using a sterile surgical marker, an appropriate advancement flap was drawn incorporating the defect and placing the expected incisions within the relaxed skin tension lines where possible.    The area thus outlined was incised deep to adipose tissue with a #15 scalpel blade.  The skin margins were undermined to an appropriate distance in all directions utilizing iris scissors. Consent 1/Introductory Paragraph: The rationale for Mohs was explained to the patient and consent was obtained. The risks, benefits and alternatives to therapy were discussed in detail. Specifically, the risks of infection, scarring, bleeding, prolonged wound healing, incomplete removal, allergy to anesthesia, nerve injury and recurrence were addressed. Prior to the procedure, the treatment site was clearly identified and confirmed by the patient. All components of Universal Protocol/PAUSE Rule completed. Transposition Flap Text: The defect edges were debeveled with a #15 scalpel blade.  Given the location of the defect and the proximity to free margins a transposition flap was deemed most appropriate.  Using a sterile surgical marker, an appropriate transposition flap was drawn incorporating the defect.    The area thus outlined was incised deep to adipose tissue with a #15 scalpel blade.  The skin margins were undermined to an appropriate distance in all directions utilizing iris scissors. Intermediate Repair And Graft Additional Text (Will Appearing After The Standard Complex Repair Text): The intermediate repair was not sufficient to completely close the primary defect. The remaining additional defect was repaired with the graft mentioned below. Consent (Ear)/Introductory Paragraph: The rationale for Mohs was explained to the patient and consent was obtained. The risks, benefits and alternatives to therapy were discussed in detail. Specifically, the risks of ear deformity, infection, scarring, bleeding, prolonged wound healing, incomplete removal, allergy to anesthesia, nerve injury and recurrence were addressed. Prior to the procedure, the treatment site was clearly identified and confirmed by the patient. All components of Universal Protocol/PAUSE Rule completed. Cheiloplasty (Less Than 50%) Text: A decision was made to reconstruct the defect with a  cheiloplasty.  The defect was undermined extensively.  Additional obicularis oris muscle was excised with a 15 blade scalpel.  The defect was converted into a full thickness wedge, of less than 50% of the vertical height of the lip, to facilite a better cosmetic result.  Small vessels were then tied off with 5-0 monocyrl. The obicularis oris, superficial fascia, adipose and dermis were then reapproximated.  After the deeper layers were approximated the epidermis was reapproximated with particular care given to realign the vermilion border.

## 2023-10-14 ENCOUNTER — OFFICE VISIT (OUTPATIENT)
Dept: URGENT CARE | Facility: CLINIC | Age: 33
End: 2023-10-14
Payer: MEDICARE

## 2023-10-14 VITALS
DIASTOLIC BLOOD PRESSURE: 72 MMHG | HEART RATE: 80 BPM | RESPIRATION RATE: 20 BRPM | BODY MASS INDEX: 43.01 KG/M2 | TEMPERATURE: 97.6 F | HEIGHT: 64 IN | WEIGHT: 251.9 LBS | OXYGEN SATURATION: 100 % | SYSTOLIC BLOOD PRESSURE: 130 MMHG

## 2023-10-14 DIAGNOSIS — L73.9 FOLLICULITIS: ICD-10-CM

## 2023-10-14 DIAGNOSIS — R06.02 SOB (SHORTNESS OF BREATH): ICD-10-CM

## 2023-10-14 DIAGNOSIS — J45.20 MILD INTERMITTENT ASTHMA WITHOUT COMPLICATION: ICD-10-CM

## 2023-10-14 PROCEDURE — 3078F DIAST BP <80 MM HG: CPT | Performed by: PHYSICIAN ASSISTANT

## 2023-10-14 PROCEDURE — 3075F SYST BP GE 130 - 139MM HG: CPT | Performed by: PHYSICIAN ASSISTANT

## 2023-10-14 PROCEDURE — 99213 OFFICE O/P EST LOW 20 MIN: CPT | Performed by: PHYSICIAN ASSISTANT

## 2023-10-14 RX ORDER — CLINDAMYCIN PHOSPHATE 10 UG/ML
LOTION TOPICAL
Qty: 60 ML | Refills: 0 | Status: SHIPPED | OUTPATIENT
Start: 2023-10-14 | End: 2024-01-16

## 2023-10-14 RX ORDER — PREGABALIN 75 MG/1
100 CAPSULE ORAL 2 TIMES DAILY
COMMUNITY
Start: 2023-10-10 | End: 2024-02-07

## 2023-10-14 ASSESSMENT — FIBROSIS 4 INDEX: FIB4 SCORE: 0.67

## 2023-10-14 NOTE — PROGRESS NOTES
"Subjective     Kristin Balderrama is a 33 y.o. female who presents with Breast Pain (X4days right breast sores/swollen/pain)            Patient presents with:  Breast Pain: X4days right breast sores/swollen/painful.  PT states not draining anything but they are also not blisters.  Pt is also complaining of having some shortness of breath, but is not sure if she is just feeling axious about her rash or if it is because she has not used her inhaler today.  PT does not like how her inhalers make her feel so she does not use them as often as she should for her asthma.  No chest pain, fever, chills or any other complaint today.         Rash  This is a new problem. The current episode started in the past 7 days. The problem has been gradually worsening since onset. Location: right breast. The rash is characterized by redness, itchiness and pain. It is unknown if there was an exposure to a precipitant. Associated symptoms include shortness of breath. Pertinent negatives include no congestion, cough or fever. Past treatments include nothing. The treatment provided no relief. Her past medical history is significant for asthma and varicella. There is no history of eczema.       Review of Systems   Constitutional:  Negative for fever.   HENT:  Negative for congestion.    Respiratory:  Positive for shortness of breath and wheezing. Negative for cough.    Skin:  Positive for itching and rash.   All other systems reviewed and are negative.             Objective     /72 (BP Location: Left arm, Patient Position: Sitting)   Pulse 80   Temp 36.4 °C (97.6 °F) (Temporal)   Resp 20   Ht 1.626 m (5' 4\")   Wt 114 kg (251 lb 14.4 oz)   SpO2 100%   BMI 43.24 kg/m²      Physical Exam  Vitals and nursing note reviewed. Exam conducted with a chaperone present.   Constitutional:       Appearance: Normal appearance. She is well-developed and well-groomed. She is obese. She is not ill-appearing or toxic-appearing.   HENT:      " Head: Normocephalic and atraumatic.      Right Ear: Tympanic membrane normal.      Left Ear: Tympanic membrane normal.      Nose: Nose normal.      Mouth/Throat:      Mouth: Mucous membranes are moist.      Pharynx: Oropharynx is clear.   Eyes:      Extraocular Movements: Extraocular movements intact.      Conjunctiva/sclera: Conjunctivae normal.      Pupils: Pupils are equal, round, and reactive to light.   Cardiovascular:      Rate and Rhythm: Normal rate and regular rhythm.      Pulses: Normal pulses.      Heart sounds: Normal heart sounds.   Pulmonary:      Effort: Pulmonary effort is normal.      Breath sounds: Wheezing present.      Comments: Scattered wheezing throughout, no rales or rhonchi.  Abdominal:      Palpations: Abdomen is soft.   Musculoskeletal:         General: Normal range of motion.      Cervical back: Normal range of motion and neck supple.   Skin:     General: Skin is warm and dry.      Capillary Refill: Capillary refill takes less than 2 seconds.          Neurological:      General: No focal deficit present.      Mental Status: She is alert and oriented to person, place, and time.      Gait: Gait normal.   Psychiatric:         Mood and Affect: Mood normal.         Behavior: Behavior is cooperative.                             Assessment & Plan                1. Folliculitis  CLINDAMYCIN PHOSPHATE,TOPICAL, 1 % Lotion      2. Mild intermittent asthma without complication        3. SOB (shortness of breath)            Patient HPI and physical exam is consistent with cheilitis of right breast.  I will treat with topical clindamycin antibiotic discussed.      Patient is evergreen bradycardia, slightly clinical states she does not like the way albuterol makes her feel.  We discussed that she is wheezing and she likely feels short of breath due to bronchospasm.  PT states she will use her inhaler when she gets home and can rest after she uses it.      Differential diagnosis, supportive care, and  indications for immediate follow-up discussed with patient.  Instructed to return to clinic or nearest emergency department for any change in condition, further concerns, or worsening of symptoms.    I personally reviewed prior external notes and test results pertinent to today's visit.  I have independently reviewed and interpreted all diagnostics ordered during this urgent care visit.    PT should follow up with PCP in 1-2 days for re-evaluation if symptoms have not improved.      Discussed red flags and reasons to return to UC or ED.      Pt and/or family verbalized understanding of diagnosis and follow up instructions and was offered informational handout on diagnosis.  PT discharged.     Please note that this dictation was created using voice recognition software. I have made every reasonable attempt to correct obvious errors, but I expect that there may be errors of grammar and possibly content that I did not discover before finalizing the note.

## 2023-10-17 ASSESSMENT — ENCOUNTER SYMPTOMS
COUGH: 0
SHORTNESS OF BREATH: 1
WHEEZING: 1
FEVER: 0

## 2023-11-14 ENCOUNTER — OFFICE VISIT (OUTPATIENT)
Dept: URGENT CARE | Facility: CLINIC | Age: 33
End: 2023-11-14
Payer: MEDICARE

## 2023-11-14 VITALS
TEMPERATURE: 97.3 F | HEART RATE: 87 BPM | RESPIRATION RATE: 20 BRPM | HEIGHT: 64 IN | DIASTOLIC BLOOD PRESSURE: 80 MMHG | SYSTOLIC BLOOD PRESSURE: 122 MMHG | OXYGEN SATURATION: 98 % | BODY MASS INDEX: 42.85 KG/M2 | WEIGHT: 251 LBS

## 2023-11-14 DIAGNOSIS — H66.91 ACUTE RIGHT OTITIS MEDIA: ICD-10-CM

## 2023-11-14 PROBLEM — E11.9 DIABETES (HCC): Status: ACTIVE | Noted: 2023-11-14

## 2023-11-14 PROBLEM — K40.90 INGUINAL HERNIA: Status: ACTIVE | Noted: 2023-11-14

## 2023-11-14 PROBLEM — E06.3 HASHIMOTO'S DISEASE: Status: ACTIVE | Noted: 2023-11-14

## 2023-11-14 PROBLEM — I47.10 SVT (SUPRAVENTRICULAR TACHYCARDIA) (HCC): Status: ACTIVE | Noted: 2023-11-14

## 2023-11-14 PROBLEM — H46.9 BILATERAL OPTIC NEURITIS: Status: ACTIVE | Noted: 2023-11-14

## 2023-11-14 PROBLEM — E78.5 HLD (HYPERLIPIDEMIA): Status: RESOLVED | Noted: 2023-11-14 | Resolved: 2023-11-14

## 2023-11-14 PROBLEM — M54.12 CERVICAL RADICULOPATHY: Status: ACTIVE | Noted: 2023-08-17

## 2023-11-14 PROBLEM — I48.91 ATRIAL FIBRILLATION (HCC): Status: ACTIVE | Noted: 2023-11-14

## 2023-11-14 PROBLEM — F44.81 MULTIPLE PERSONALITIES (HCC): Status: ACTIVE | Noted: 2023-11-14

## 2023-11-14 PROBLEM — E28.2 PCOS (POLYCYSTIC OVARIAN SYNDROME): Status: ACTIVE | Noted: 2023-11-14

## 2023-11-14 LAB — S PYO DNA SPEC NAA+PROBE: NOT DETECTED

## 2023-11-14 PROCEDURE — 99214 OFFICE O/P EST MOD 30 MIN: CPT | Performed by: NURSE PRACTITIONER

## 2023-11-14 PROCEDURE — 87651 STREP A DNA AMP PROBE: CPT | Performed by: NURSE PRACTITIONER

## 2023-11-14 PROCEDURE — 3079F DIAST BP 80-89 MM HG: CPT | Performed by: NURSE PRACTITIONER

## 2023-11-14 PROCEDURE — 3074F SYST BP LT 130 MM HG: CPT | Performed by: NURSE PRACTITIONER

## 2023-11-14 RX ORDER — AZITHROMYCIN 250 MG/1
TABLET, FILM COATED ORAL
Qty: 6 TABLET | Refills: 0 | Status: SHIPPED | OUTPATIENT
Start: 2023-11-14 | End: 2024-01-16

## 2023-11-14 ASSESSMENT — FIBROSIS 4 INDEX: FIB4 SCORE: 0.67

## 2023-11-14 NOTE — PROGRESS NOTES
Chief Complaint   Patient presents with    Asthma     Wheezing, sore throat, x 3 days       HISTORY OF PRESENT ILLNESS: Patient is a pleasant 33 y.o. female who presents today due to three days of symptoms to include nasal congestion, ear pressure, headache, sore throat, and sinus pressure.  Patient has a history of asthma, reports chronic cough and wheezing, which have worsened slightly over the last few days.  She has been using her albuterol for treatment.  Patient has numerous medication allergies, states most of the allergies are mild, with a mild rash, she takes Benadryl, per her PCP, to prevent adverse reactions.      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    Diabetes ([facility]) 11/14/2023    Inguinal hernia 11/14/2023    Myopia of both eyes 08/29/2023    Cervical radiculopathy 08/17/2023    Thrombocytopenia ([facility]) 02/03/2023    S/p left hip fracture 01/31/2023    Postural orthostatic tachycardia syndrome 01/28/2023    Hip fracture, left, closed, initial encounter ([facility]) 01/27/2023    Moderate asthma 01/27/2023    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    Hypermobility syndrome 11/10/2022    Kidney stone 10/31/2022    Insomnia 10/31/2022    Migraine 10/10/2022    Heart murmur 10/10/2022    Right inguinal hernia 10/09/2022    Migraine, hemiplegic 07/27/2022    Irritable bowel syndrome 07/27/2022    Hemiplegic 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, not intractable 07/27/2022    Hidradenitis axillaris 07/10/2022    Vision changes 06/28/2022    Palpitations 05/13/2022    Inappropriate sinus tachycardia 09/24/2021    Psychogenic disorder 06/24/2021    Diplopia 06/23/2021    Failure to thrive in adult 06/21/2021    Acute bilateral low back pain with bilateral  sciatica 06/16/2021    Normal pressure hydrocephalus ([facility]) 06/04/2021    GIB (gastrointestinal bleeding) 05/24/2021    Uncomplicated asthma 04/15/2021    Blood per rectum 04/15/2021    Bilateral hand pain 04/05/2021    Suprapubic catheter dysfunction ([facility]) 02/05/2021    UTI (urinary tract infection) 10/11/2020    Seizures ([facility]) 09/04/2020    Dry eyes 08/18/2020    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    Chronic obstructive lung disease ([facility]) 04/20/2020    Schizoaffective disorder, depressive type ([facility]) 03/02/2020    PTSD (post-traumatic stress disorder) 03/02/2020    IIH (idiopathic intracranial hypertension) 02/27/2020    Mixed stress and urge urinary incontinence 12/27/2019    Mild intermittent asthma without complication 12/16/2019    Immunocompromised ([facility]) 11/06/2019    Metabolic acidosis 08/30/2019    Moderate persistent asthma with acute exacerbation 08/14/2019    Optic neuritis 05/27/2019    Supraventricular tachycardia 04/10/2019    Muscular deconditioning 07/14/2018    TONYA (obstructive sleep apnea) 01/09/2018    Functional diarrhea 01/05/2018    Depression 10/28/2016    Schizophrenia ([facility]) 10/27/2016    Full incontinence of feces 07/13/2016    Polypharmacy 06/27/2016    Vitamin D deficiency 05/21/2016    Lactic acidosis 04/19/2016    Morbidly obese ([facility]) 03/07/2016    Scoliosis 03/07/2016    GERD (gastroesophageal reflux disease) 03/07/2016    Peripheral neuropathy and chronic pain syndrome ([facility]) 03/06/2016    Atypical chest pain 08/06/2015    Fatty liver disease, nonalcoholic 01/19/2015    Anxiety 12/16/2014    Hyperglycemia 09/05/2014    Urinary retention 04/02/2011    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/02/2011    Borderline personality disorder in adult ([facility]) 09/18/2010    AP (abdominal pain) 09/18/2010       Allergies:Cefdinir, Depakote [divalproex sodium], Doxycycline, Montelukast [singulair], Vancomycin, Wound dressing adhesive, Amitriptyline, Aripiprazole, Aripiprazole [abilify],  Clindamycin, Flomax [tamsulosin hydrochloride], Levaquin, Metformin, Tamsulosin, Tape, Amoxicillin, Depakote [divalproex sodium], Erythromycin, Hydromorphone, Montelukast, Ampicillin, Ciprofloxacin, Keflex, Levofloxacin, Metronidazole, Penicillins, Sulfa drugs, and Valproic acid    Current Outpatient Medications Ordered in Epic   Medication Sig Dispense Refill    azithromycin (ZITHROMAX) 250 MG Tab Take two tabs on day one followed by one tab on days 2-5. 6 Tablet 0    pregabalin (LYRICA) 75 MG Cap 1 CAPSULE TWICE A DAY QTY 60 FOR 30 DAY      CLINDAMYCIN PHOSPHATE,TOPICAL, 1 % Lotion Apply to skin as directed for folliculitis x 10 days. 60 mL 0    topiramate (TOPAMAX) 25 MG Tab Take 25-50 mg by mouth 2 times a day. 1 tablet (25 mg) in the morning 2 tablets (50 mg) at bedtime      albuterol (PROVENTIL) 2.5mg/0.5ml Nebu Soln Take 2.5 mg by nebulization every four hours as needed for Shortness of Breath.      Adalimumab 80 MG/0.8ML Pen-injector Kit Inject 80 mg under the skin every 14 days.      albuterol 108 (90 Base) MCG/ACT Aero Soln inhalation aerosol Inhale 2 Puffs every 6 hours as needed for Shortness of Breath. 8.5 g 0    tizanidine (ZANAFLEX) 4 MG Tab Take 4 mg by mouth 3 times a day.      sodium chloride (SALT) 1 GM Tab Take 1 Tablet by mouth 3 times a day with meals. 90 Tablet 2    diphenhydrAMINE (BENADRYL) 50 MG tablet Take 50 mg by mouth at bedtime.      Melatonin 10 MG Tab Take 10 mg by mouth at bedtime.      etonogestrel (NEXPLANON) 68 MG Implant implant Inject 1 Each under the skin see administration instructions. Pt reports she is not sure when she had this medications injected last, pt reports she still has it in her arm  Every 3 years to change      mupirocin (BACTROBAN) 2 % Ointment Apply 1 Application topically 2 times a day. (Patient not taking: Reported on 9/28/2023) 22 g 0    Galcanezumab-gnlm (EMGALITY) 120 MG/ML Solution Auto-injector Inject  under the skin. (Patient not taking: Reported on  "9/28/2023)      ketorolac (ACULAR) 0.5 % Solution Administer 1 Drop into both eyes 4 times a day. (Patient not taking: Reported on 9/28/2023) 3 mL 0    benzonatate (TESSALON) 200 MG capsule Take 1 Capsule by mouth 3 times a day as needed for Cough. (Patient not taking: Reported on 9/14/2023) 30 Capsule 0    fluticasone (FLONASE) 50 MCG/ACT nasal spray Administer 2 Sprays into affected nostril(S) at bedtime. (Patient not taking: Reported on 9/28/2023) 16 g 1    predniSONE (DELTASONE) 50 MG Tab predniSONE (Patient not taking: Reported on 9/14/2023)      topiramate (TOPAMAX) 25 MG Tab Take 25 Tablets by mouth 2 times a day. (Patient not taking: Reported on 9/14/2023)      fluconazole (DIFLUCAN) 150 MG tablet Take 150 mg by mouth every Wednesday. (Patient not taking: Reported on 9/28/2023)      metoprolol SR (TOPROL XL) 25 MG TABLET SR 24 HR Take 1 Tablet by mouth every day. (Patient not taking: Reported on 9/28/2023) 90 Tablet 3    docusate sodium (STOOL SOFTENER) 250 MG capsule Take 250 mg by mouth 2 times a day. (Patient not taking: Reported on 9/28/2023)      naproxen (NAPROSYN) 500 MG Tab Take 1 Tablet by mouth 2 times a day with meals. (Patient not taking: Reported on 9/14/2023) 20 Tablet 0    ivabradine (CORLANOR) 7.5 MG Tab tablet Take 1 Tablet by mouth 2 times a day with meals. (Patient not taking: Reported on 9/28/2023) 60 Tablet 2    ziprasidone (GEODON) 40 MG Cap Take 1 capsule by mouth at bedtime. (Patient not taking: Reported on 11/14/2023) 30 Capsule 2     No current Epic-ordered facility-administered medications on file.       Past Medical History:   Diagnosis Date    Abdominal pain     Anginal syndrome     random chest pain especially with tachycardia    Apnea, sleep     Arrhythmia     \"sinus tachycardia\", cariologist, Dr. Kumar; ablation 2/2016    Arthritis     osteo    ASTHMA     when around smoke    Back pain     Borderline personality disorder (HCC)     Breath shortness     with tachycardia    " "Bronchitis 02/08/2022    Last time was 12/21    Cardiac arrhythmia     Chickenpox     Chronic UTI 09/18/2010    Cough     Daytime sleepiness     Dental disorder 03/08/2021    infected tooth    Depression     Diabetes (HCC)     Diarrhea     incontinece    Disorder of thyroid     Hashimoto's    Fall     Fatigue     Frequent headaches     Gasping for breath     Gynecological disorder     PCOS    Headache(784.0)     Heart burn     Heart murmur     History of falling     Indigestion     Migraine     Mitochondrial disease (HCC)     Multiple personality disorder (HCC)     Nausea     Obesity     Other fatigue 06/29/2020    Pain 08/15/2012    back, 7/10    Painful joint     PCOS (polycystic ovarian syndrome)     Pneumonia 2012 12/2020    POTS (postural orthostatic tachycardia syndrome)     Psychosis (HCC)     Ringing in ears     Scoliosis     Shortness of breath     O2 as needed    Sinus tachycardia 10/31/2013    Sleep apnea     CPAP \"pulmonary doctor took me off mid year 2016\"    Snoring     Supraventricular tachycardia 4/10/2019    Tonsillitis     Transverse myelitis (HCC)     2/8/22: Per pt: not anymore    Tuberculosis     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    Urinary bladder disorder     Suprapubic cath. 2/8/22: Not anymore.     Urinary incontinence     Weakness     Wears glasses        Social History     Tobacco Use    Smoking status: Never    Smokeless tobacco: Never   Vaping Use    Vaping Use: Never used   Substance Use Topics    Alcohol use: No     Alcohol/week: 0.0 oz    Drug use: Not Currently     Frequency: 7.0 times per week       Family Status   Relation Name Status    Mo thyroid disease,IBS Alive        44 2016    MUnc  Alive    MGMo  Alive    Fa  Alive        bio father hx unknown    Sis  Alive    Sis  Alive     Family History   Problem Relation Age of Onset    Hypertension Mother     Sleep Apnea Mother     Heart Disease Mother     Other Mother         hypothryod    Hypertension Maternal Uncle     " "Heart Disease Maternal Grandmother     Hypertension Maternal Grandmother     No Known Problems Sister     Other Sister         Narcolepsy;fibromyalsia;bone;nerve    Genitourinary () Problems Sister         endometriosis       ROS:  Review of Systems   Constitutional: Positive for malaise, fatigue. Negative for weight loss.   HENT: Positive for ear pain, sinus pressure, sore throat, nasal congestion. Negative for ear pain, nosebleeds, neck pain.    Eyes: Negative for vision changes.   Neuro: Positive for headache. Negative for sensory changes, weakness, seizure, LOC.  Cardiovascular: Negative for chest pain, palpitations, orthopnea and leg swelling.   Respiratory: Positive for chronic cough, sputum production, shortness of breath and wheezing.   Gastrointestinal: Negative for abdominal pain, nausea, vomiting or diarrhea.    Skin: Negative for rash, diaphoresis.     Exam:  /80   Pulse 87   Temp 36.3 °C (97.3 °F)   Resp 20   Ht 1.626 m (5' 4\")   Wt 114 kg (251 lb)   SpO2 98%   General: well-nourished, well-developed female in NAD  Head: normocephalic, atraumatic  Eyes: PERRLA, no conjunctival injection, acuity grossly intact, lids normal.  Ears: normal shape and symmetry, no tenderness, no discharge. External canals are without any significant edema or erythema.  Left tympanic membrane is without any inflammation, no effusion.  Right tympanic membrane is erythematous, injected, intact.  Gross auditory acuity is intact.  Nose: symmetrical without tenderness, erythema and swelling noted bilateral turbinates, clear discharge.  No sinus tenderness.   Mouth/Throat: reasonable hygiene, no exudates or tonsillar enlargement. Erythema is present.   Neck: no masses, range of motion within normal limits, no tracheal deviation. No obvious thyroid enlargement.   Lymph: no cervical adenopathy. No supraclavicular adenopathy.   Neuro: alert and oriented. Cranial nerves 1-12 grossly intact. No sensory deficit. "   Cardiovascular: regular rate and rhythm. No edema.  Pulmonary: no distress. Chest is symmetrical with respiration, no wheezes, crackles, or rhonchi.   Musculoskeletal: no clubbing, appropriate muscle tone, gait is stable.  Skin: warm, dry, intact, no clubbing, no cyanosis, no rashes.   Psych: appropriate mood, affect, judgement.         Assessment/Plan:  1. Acute right otitis media  azithromycin (ZITHROMAX) 250 MG Tab            Antibiotic (patient states she has taken azithromycin in the past and tolerated well), as directed, potential side effects of medication discussed.   Sleep with HOB elevated, humidifier at night, rest, increase fluid intake.   Supportive care, differential diagnoses, and indications for immediate follow-up discussed with patient.   Pathogenesis of diagnosis discussed including typical length and natural progression.   Instructed to return to clinic or nearest emergency department for any change in condition, further concerns, or worsening of symptoms.  Patient states understanding of the plan of care and discharge instructions.  Instructed to make an appointment, for follow up, with her primary care provider.        Please note that this dictation was created using voice recognition software. I have made every reasonable attempt to correct obvious errors, but I expect that there are errors of grammar and possibly content that I did not discover before finalizing the note.       PER Mehta.

## 2023-11-15 ENCOUNTER — APPOINTMENT (OUTPATIENT)
Dept: RADIOLOGY | Facility: MEDICAL CENTER | Age: 33
End: 2023-11-15
Attending: STUDENT IN AN ORGANIZED HEALTH CARE EDUCATION/TRAINING PROGRAM
Payer: MEDICARE

## 2023-11-15 ENCOUNTER — APPOINTMENT (OUTPATIENT)
Dept: RADIOLOGY | Facility: MEDICAL CENTER | Age: 33
End: 2023-11-15
Attending: EMERGENCY MEDICINE
Payer: MEDICARE

## 2023-11-15 ENCOUNTER — HOSPITAL ENCOUNTER (EMERGENCY)
Facility: MEDICAL CENTER | Age: 33
End: 2023-11-15
Attending: STUDENT IN AN ORGANIZED HEALTH CARE EDUCATION/TRAINING PROGRAM
Payer: MEDICARE

## 2023-11-15 VITALS
TEMPERATURE: 97.7 F | BODY MASS INDEX: 43.43 KG/M2 | HEIGHT: 64 IN | RESPIRATION RATE: 16 BRPM | OXYGEN SATURATION: 96 % | SYSTOLIC BLOOD PRESSURE: 127 MMHG | DIASTOLIC BLOOD PRESSURE: 57 MMHG | HEART RATE: 100 BPM | WEIGHT: 254.41 LBS

## 2023-11-15 DIAGNOSIS — J45.51 SEVERE PERSISTENT ASTHMA WITH ACUTE EXACERBATION: ICD-10-CM

## 2023-11-15 LAB
ALBUMIN SERPL BCP-MCNC: 4.6 G/DL (ref 3.2–4.9)
ALBUMIN/GLOB SERPL: 2.1 G/DL
ALP SERPL-CCNC: 58 U/L (ref 30–99)
ALT SERPL-CCNC: 27 U/L (ref 2–50)
ANION GAP SERPL CALC-SCNC: 14 MMOL/L (ref 7–16)
APPEARANCE UR: CLEAR
AST SERPL-CCNC: 16 U/L (ref 12–45)
BASOPHILS # BLD AUTO: 0.6 % (ref 0–1.8)
BASOPHILS # BLD: 0.04 K/UL (ref 0–0.12)
BILIRUB SERPL-MCNC: 0.3 MG/DL (ref 0.1–1.5)
BILIRUB UR QL STRIP.AUTO: NEGATIVE
BUN SERPL-MCNC: 17 MG/DL (ref 8–22)
CALCIUM ALBUM COR SERPL-MCNC: 8.4 MG/DL (ref 8.5–10.5)
CALCIUM SERPL-MCNC: 8.9 MG/DL (ref 8.5–10.5)
CHLORIDE SERPL-SCNC: 112 MMOL/L (ref 96–112)
CO2 SERPL-SCNC: 14 MMOL/L (ref 20–33)
COLOR UR: YELLOW
CREAT SERPL-MCNC: 0.86 MG/DL (ref 0.5–1.4)
EKG IMPRESSION: NORMAL
EOSINOPHIL # BLD AUTO: 0.05 K/UL (ref 0–0.51)
EOSINOPHIL NFR BLD: 0.7 % (ref 0–6.9)
ERYTHROCYTE [DISTWIDTH] IN BLOOD BY AUTOMATED COUNT: 41.3 FL (ref 35.9–50)
FLUAV RNA SPEC QL NAA+PROBE: NEGATIVE
FLUBV RNA SPEC QL NAA+PROBE: NEGATIVE
GFR SERPLBLD CREATININE-BSD FMLA CKD-EPI: 91 ML/MIN/1.73 M 2
GLOBULIN SER CALC-MCNC: 2.2 G/DL (ref 1.9–3.5)
GLUCOSE SERPL-MCNC: 98 MG/DL (ref 65–99)
GLUCOSE UR STRIP.AUTO-MCNC: NEGATIVE MG/DL
HCG SERPL QL: NEGATIVE
HCT VFR BLD AUTO: 39.6 % (ref 37–47)
HGB BLD-MCNC: 14.4 G/DL (ref 12–16)
IMM GRANULOCYTES # BLD AUTO: 0.03 K/UL (ref 0–0.11)
IMM GRANULOCYTES NFR BLD AUTO: 0.4 % (ref 0–0.9)
KETONES UR STRIP.AUTO-MCNC: NEGATIVE MG/DL
LACTATE SERPL-SCNC: 1.8 MMOL/L (ref 0.5–2)
LEUKOCYTE ESTERASE UR QL STRIP.AUTO: NEGATIVE
LYMPHOCYTES # BLD AUTO: 2.79 K/UL (ref 1–4.8)
LYMPHOCYTES NFR BLD: 41.5 % (ref 22–41)
MCH RBC QN AUTO: 32.9 PG (ref 27–33)
MCHC RBC AUTO-ENTMCNC: 36.4 G/DL (ref 32.2–35.5)
MCV RBC AUTO: 90.4 FL (ref 81.4–97.8)
MICRO URNS: NORMAL
MONOCYTES # BLD AUTO: 0.47 K/UL (ref 0–0.85)
MONOCYTES NFR BLD AUTO: 7 % (ref 0–13.4)
NEUTROPHILS # BLD AUTO: 3.34 K/UL (ref 1.82–7.42)
NEUTROPHILS NFR BLD: 49.8 % (ref 44–72)
NITRITE UR QL STRIP.AUTO: NEGATIVE
NRBC # BLD AUTO: 0 K/UL
NRBC BLD-RTO: 0 /100 WBC (ref 0–0.2)
PH UR STRIP.AUTO: 5 [PH] (ref 5–8)
PLATELET # BLD AUTO: 168 K/UL (ref 164–446)
PMV BLD AUTO: 11.8 FL (ref 9–12.9)
POTASSIUM SERPL-SCNC: 3.6 MMOL/L (ref 3.6–5.5)
PROT SERPL-MCNC: 6.8 G/DL (ref 6–8.2)
PROT UR QL STRIP: NEGATIVE MG/DL
RBC # BLD AUTO: 4.38 M/UL (ref 4.2–5.4)
RBC UR QL AUTO: NEGATIVE
RSV RNA SPEC QL NAA+PROBE: NEGATIVE
SARS-COV-2 RNA RESP QL NAA+PROBE: NOTDETECTED
SODIUM SERPL-SCNC: 140 MMOL/L (ref 135–145)
SP GR UR STRIP.AUTO: 1.02
SPECIMEN SOURCE: NORMAL
TROPONIN T SERPL-MCNC: <6 NG/L (ref 6–19)
UROBILINOGEN UR STRIP.AUTO-MCNC: 0.2 MG/DL
WBC # BLD AUTO: 6.7 K/UL (ref 4.8–10.8)

## 2023-11-15 PROCEDURE — 99284 EMERGENCY DEPT VISIT MOD MDM: CPT

## 2023-11-15 PROCEDURE — 84484 ASSAY OF TROPONIN QUANT: CPT

## 2023-11-15 PROCEDURE — 94640 AIRWAY INHALATION TREATMENT: CPT

## 2023-11-15 PROCEDURE — 81003 URINALYSIS AUTO W/O SCOPE: CPT

## 2023-11-15 PROCEDURE — 700111 HCHG RX REV CODE 636 W/ 250 OVERRIDE (IP): Mod: UD | Performed by: EMERGENCY MEDICINE

## 2023-11-15 PROCEDURE — C9803 HOPD COVID-19 SPEC COLLECT: HCPCS | Performed by: EMERGENCY MEDICINE

## 2023-11-15 PROCEDURE — 700101 HCHG RX REV CODE 250: Mod: UD | Performed by: EMERGENCY MEDICINE

## 2023-11-15 PROCEDURE — 84703 CHORIONIC GONADOTROPIN ASSAY: CPT

## 2023-11-15 PROCEDURE — 87040 BLOOD CULTURE FOR BACTERIA: CPT

## 2023-11-15 PROCEDURE — 93005 ELECTROCARDIOGRAM TRACING: CPT | Performed by: STUDENT IN AN ORGANIZED HEALTH CARE EDUCATION/TRAINING PROGRAM

## 2023-11-15 PROCEDURE — 93005 ELECTROCARDIOGRAM TRACING: CPT

## 2023-11-15 PROCEDURE — 85025 COMPLETE CBC W/AUTO DIFF WBC: CPT

## 2023-11-15 PROCEDURE — 0241U HCHG SARS-COV-2 COVID-19 NFCT DS RESP RNA 4 TRGT MIC: CPT

## 2023-11-15 PROCEDURE — 87086 URINE CULTURE/COLONY COUNT: CPT

## 2023-11-15 PROCEDURE — 80053 COMPREHEN METABOLIC PANEL: CPT

## 2023-11-15 PROCEDURE — 83605 ASSAY OF LACTIC ACID: CPT

## 2023-11-15 PROCEDURE — 71045 X-RAY EXAM CHEST 1 VIEW: CPT

## 2023-11-15 PROCEDURE — 36415 COLL VENOUS BLD VENIPUNCTURE: CPT

## 2023-11-15 RX ORDER — ALBUTEROL SULFATE 2.5 MG/3ML
2.5 SOLUTION RESPIRATORY (INHALATION) EVERY 4 HOURS PRN
Qty: 100 ML | Refills: 3 | Status: ON HOLD | OUTPATIENT
Start: 2023-11-15 | End: 2024-02-12

## 2023-11-15 RX ORDER — PREDNISONE 20 MG/1
60 TABLET ORAL DAILY
Status: DISCONTINUED | OUTPATIENT
Start: 2023-11-15 | End: 2023-11-15 | Stop reason: HOSPADM

## 2023-11-15 RX ORDER — PREDNISONE 20 MG/1
60 TABLET ORAL DAILY
Qty: 12 TABLET | Refills: 0 | Status: SHIPPED | OUTPATIENT
Start: 2023-11-15 | End: 2023-11-19

## 2023-11-15 RX ORDER — PREDNISONE 20 MG/1
60 TABLET ORAL DAILY
Qty: 12 TABLET | Refills: 0 | Status: SHIPPED | OUTPATIENT
Start: 2023-11-15 | End: 2023-11-15 | Stop reason: SDUPTHER

## 2023-11-15 RX ADMIN — Medication 10 MG/HR: at 19:42

## 2023-11-15 RX ADMIN — IPRATROPIUM BROMIDE 0.5 MG: 0.5 SOLUTION RESPIRATORY (INHALATION) at 19:42

## 2023-11-15 RX ADMIN — PREDNISONE 60 MG: 20 TABLET ORAL at 19:29

## 2023-11-15 ASSESSMENT — FIBROSIS 4 INDEX: FIB4 SCORE: 0.67

## 2023-11-16 NOTE — ED PROVIDER NOTES
"ER Provider Note    Scribed for Emanuel Díaz M.D. by Nasima West. 11/15/2023   7:10 PM    Primary Care Provider: Torres Brody M.D.    CHIEF COMPLAINT  Chief Complaint   Patient presents with    Chest Pain     Tightness in chest, shallow breaths, with SOB and h/o asthma and reacting .  Also h/o POTS.     Shortness of Breath     4 duonebs and 6 puffs albuterol,      EXTERNAL RECORDS REVIEWED  Inpatient Notes The patient has a history of POTS, multiple personality disorder, diabetes, and asthma. They were seen here 6 weeks ago for back pain.      HPI/ROS  LIMITATION TO HISTORY   Select: : None  OUTSIDE HISTORIAN(S):  None    Kristin Balderrama is a 33 y.o. female with history of asthma and POTS who presents to the ED for evaluation of shortness of breath onset a few days ago. The patient explains she has been experiencing shortness of breath for the last few days with a \"crushing feeling\" in her chest beginning 2 days ago. She has associated chest pain, difficulty speaking, difficulty breathing, abdominal pain, and cough with sputum. The patient denies blood in her urine or bowel movements. The patient denies taking a COVID home test. No alleviating or exacerbating factors were noted.    PAST MEDICAL HISTORY  Past Medical History:   Diagnosis Date    Abdominal pain     Anginal syndrome     random chest pain especially with tachycardia    Apnea, sleep     Arrhythmia     \"sinus tachycardia\", cariologist, Dr. Kumar; ablation 2/2016    Arthritis     osteo    ASTHMA     when around smoke    Back pain     Borderline personality disorder (HCC)     Breath shortness     with tachycardia    Bronchitis 02/08/2022    Last time was 12/21    Cardiac arrhythmia     Chickenpox     Chronic UTI 09/18/2010    Cough     Daytime sleepiness     Dental disorder 03/08/2021    infected tooth    Depression     Diabetes (HCC)     Diarrhea     incontinece    Disorder of thyroid     Hashimoto's    Fall     Fatigue     Frequent " "headaches     Gasping for breath     Gynecological disorder     PCOS    Headache(784.0)     Heart burn     Heart murmur     History of falling     Indigestion     Migraine     Mitochondrial disease (HCC)     Multiple personality disorder (HCC)     Nausea     Obesity     Other fatigue 06/29/2020    Pain 08/15/2012    back, 7/10    Painful joint     PCOS (polycystic ovarian syndrome)     Pneumonia 2012 12/2020    POTS (postural orthostatic tachycardia syndrome)     Psychosis (HCC)     Ringing in ears     Scoliosis     Shortness of breath     O2 as needed    Sinus tachycardia 10/31/2013    Sleep apnea     CPAP \"pulmonary doctor took me off mid year 2016\"    Snoring     Supraventricular tachycardia 4/10/2019    Tonsillitis     Transverse myelitis (HCC)     2/8/22: Per pt: not anymore    Tuberculosis     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    Urinary bladder disorder     Suprapubic cath. 2/8/22: Not anymore.     Urinary incontinence     Weakness     Wears glasses        SURGICAL HISTORY  Past Surgical History:   Procedure Laterality Date    INGUINAL HERNIA LAPAROSCOPIC Right 07/21/2023    Procedure: LAPAROSCOPIC RIGHT INGUINAL HERNIA REPAIR WITH MESH;  Surgeon: Joe Noyola M.D.;  Location: [facility];  Service: General    HERNIA REPAIR Right 07/21/2023    PB PERCUT FIX PBOX/NECK FEMUR FX Left 01/28/2023    Procedure: FIXATION, HIP, USING CANNULATED SCREW;  Surgeon: Noman Abdul M.D.;  Location: [facility];  Service: Orthopedics    CA LAP,DIAGNOSTIC ABDOMEN  02/14/2022    Procedure: LAPAROSCOPY;  Surgeon: Seamus Pisano M.D.;  Location: SURGERY SAME DAY [facility];  Service: Gynecology    OVARIAN CYSTECTOMY Right 02/14/2022    Procedure: EXCISION, CYST, OVARY;  Surgeon: Seamus Pisano M.D.;  Location: SURGERY SAME DAY [facility];  Service: Gynecology    BOWEL STIMULATOR INSERTION  03/10/2021    Procedure: INSERTION, ELECTRODE LEADS AND PULSE GENERATOR, NEUROSTIMULATOR, SACRAL - REMOVAL OF " INTERSTIM WITH REPLACEMENT OF SACRAL NEUROMODULATION DEVICE;  Surgeon: Joe Noyola M.D.;  Location: SURGERY [facility];  Service: General    MUSCLE BIOPSY Right 01/26/2017    Procedure: MUSCLE BIOPSY - THIGH;  Surgeon: Isidro Vigil M.D.;  Location: SURGERY [facility];  Service:     GASTROSCOPY WITH BALLOON DILATATION N/A 01/04/2017    Procedure: GASTROSCOPY WITH DILATATION;  Surgeon: Torres Vargas M.D.;  Location: SURGERY [facility];  Service:     BOWEL STIMULATOR INSERTION  07/13/2016    Procedure: BOWEL STIMULATOR INSERTION FOR PERMANENT INTERSTIM SACRAL IMPLANT;  Surgeon: Joe Noyola M.D.;  Location: SURGERY [facility];  Service:     RECOVERY  01/27/2016    Procedure: CATH LAB EP STUDY WITH SINUS NODE MODIFICATION LA;  Surgeon: [facility];  Location: SURGERY PRE-POST PROC UNIT [facility];  Service:     OTHER CARDIAC SURGERY  01/2016    cardiac ablation    NEURO DEST FACET L/S W/IG SNGL  04/21/2015    Performed by Reza Tabor at SURGERY [facility]    LUMBAR FUSION ANTERIOR  08/21/2012    Performed by SUSIE SAWANT at SURGERY [facility]    ALYSSA BY LAPAROSCOPY  08/29/2010    Performed by SHAYY JOHNS at SURGERY [facility]    LAMINOTOMY      OTHER ABDOMINAL SURGERY      TONSILLECTOMY      tonsillectomy       FAMILY HISTORY  Family History   Problem Relation Age of Onset    Hypertension Mother     Sleep Apnea Mother     Heart Disease Mother     Other Mother         hypothryod    Hypertension Maternal Uncle     Heart Disease Maternal Grandmother     Hypertension Maternal Grandmother     No Known Problems Sister     Other Sister         Narcolepsy;fibromyalsia;bone;nerve    Genitourinary () Problems Sister         endometriosis       SOCIAL HISTORY   reports that she has never smoked. She has never used smokeless tobacco. She reports that she does not currently use drugs. Frequency: 7.00 times per week. She reports that she does not drink alcohol.    CURRENT  MEDICATIONS  Previous Medications    ADALIMUMAB 80 MG/0.8ML PEN-INJECTOR KIT    Inject 80 mg under the skin every 14 days.    ALBUTEROL 108 (90 BASE) MCG/ACT AERO SOLN INHALATION AEROSOL    Inhale 2 Puffs every 6 hours as needed for Shortness of Breath.    AZITHROMYCIN (ZITHROMAX) 250 MG TAB    Take two tabs on day one followed by one tab on days 2-5.    BENZONATATE (TESSALON) 200 MG CAPSULE    Take 1 Capsule by mouth 3 times a day as needed for Cough.    CLINDAMYCIN PHOSPHATE,TOPICAL, 1 % LOTION    Apply to skin as directed for folliculitis x 10 days.    DIPHENHYDRAMINE (BENADRYL) 50 MG TABLET    Take 50 mg by mouth at bedtime.    DOCUSATE SODIUM (STOOL SOFTENER) 250 MG CAPSULE    Take 250 mg by mouth 2 times a day.    ETONOGESTREL (NEXPLANON) 68 MG IMPLANT IMPLANT    Inject 1 Each under the skin see administration instructions. Pt reports she is not sure when she had this medications injected last, pt reports she still has it in her arm  Every 3 years to change    FLUCONAZOLE (DIFLUCAN) 150 MG TABLET    Take 150 mg by mouth every Wednesday.    FLUTICASONE (FLONASE) 50 MCG/ACT NASAL SPRAY    Administer 2 Sprays into affected nostril(S) at bedtime.    GALCANEZUMAB-GNLM (EMGALITY) 120 MG/ML SOLUTION AUTO-INJECTOR    Inject  under the skin.    IVABRADINE (CORLANOR) 7.5 MG TAB TABLET    Take 1 Tablet by mouth 2 times a day with meals.    KETOROLAC (ACULAR) 0.5 % SOLUTION    Administer 1 Drop into both eyes 4 times a day.    MELATONIN 10 MG TAB    Take 10 mg by mouth at bedtime.    METOPROLOL SR (TOPROL XL) 25 MG TABLET SR 24 HR    Take 1 Tablet by mouth every day.    MUPIROCIN (BACTROBAN) 2 % OINTMENT    Apply 1 Application topically 2 times a day.    NAPROXEN (NAPROSYN) 500 MG TAB    Take 1 Tablet by mouth 2 times a day with meals.    PREGABALIN (LYRICA) 75 MG CAP    1 CAPSULE TWICE A DAY QTY 60 FOR 30 DAY    SODIUM CHLORIDE (SALT) 1 GM TAB    Take 1 Tablet by mouth 3 times a day with meals.    TIZANIDINE (ZANAFLEX) 4  "MG TAB    Take 4 mg by mouth 3 times a day.    TOPIRAMATE (TOPAMAX) 25 MG TAB    Take 25-50 mg by mouth 2 times a day. 1 tablet (25 mg) in the morning 2 tablets (50 mg) at bedtime    TOPIRAMATE (TOPAMAX) 25 MG TAB    Take 25 Tablets by mouth 2 times a day.    ZIPRASIDONE (GEODON) 40 MG CAP    Take 1 capsule by mouth at bedtime.       ALLERGIES  Allergies   Allergen Reactions    Cefdinir Shortness of Breath and Itching     Tolerated 1/18/17  Tolerates ceftriaxone  Tolerated augmentin 8/2019     Depakote [Divalproex Sodium] Unspecified     Muscle spasms/muscle pain and weakness      Doxycycline Anaphylaxis and Vomiting     Other reaction(s): pustules/blisters  Other reaction(s): stomach pain    Montelukast [Singulair] Unspecified     Cardiac effusion    Vancomycin Itching     Pt becomes flushed in face and chest.   RXN=7/10/16    Wound Dressing Adhesive Rash     By pt report-\"removes skin totally off\"    Amitriptyline Unspecified     Headaches      Aripiprazole Unspecified    Aripiprazole [Abilify] Unspecified     Headaches/muscle twitching      Clindamycin Nausea         Other reaction(s): nausea stomach pain    Flomax [Tamsulosin Hydrochloride] Swelling    Levaquin Unspecified     Severe muscle cramps in legs  RXN=unknown  Other reaction(s): leg muscle cramps    Metformin Unspecified     Increased lactic acid     Other reaction(s): itching and rash/nausea vomiting    Tamsulosin Swelling     Swelling of legs    Tape Rash     Tears skin off  coban with Tegaderm tape ok intermittently  RXN=ongoing    Amoxicillin Rash    Depakote [Divalproex Sodium]     Erythromycin Rash     .  Other reaction(s): nausea stomach pain    Hydromorphone      Other reaction(s): vomiting    Montelukast     Ampicillin Rash     Pt reports that she received a rash     Ciprofloxacin Rash          Keflex Rash     Pt states she gets a rash with this medication  Tolerates ceftriaxone  Can take with Benadryl    Levofloxacin Unspecified     Leg muscle " "cramps    Metronidazole Rash     \"Vision problems\"  Other reaction(s): vision problems    Penicillins Hives     Can take with Benadryl    Sulfa Drugs Itching, Myalgia and Hives     Muscle pain and weakness  Other reaction(s): unknown    Valproic Acid Rash     .        PHYSICAL EXAM  BP (!) 158/105   Pulse 85   Temp 37.3 °C (99.1 °F) (Temporal)   Resp 18   Ht 1.626 m (5' 4\")   Wt 115 kg (254 lb 6.6 oz)   SpO2 98%   BMI 43.67 kg/m²      Constitutional: Well developed, Well nourished, Moderate distress, Elevated BMI.  HENT: Normocephalic, Atraumatic   Eyes: PERRLA, EOMI, Conjunctiva normal, No discharge.   Neck: No tenderness, Supple, No stridor.   Cardiovascular: Normal heart rate, Normal rhythm.   Thorax & Lungs: Tachypnea, Moderate respiratory distress, decreased breath sounds throughout, No wheezing, No crackles.   Abdomen: Soft, No tenderness, No masses, No pulsatile masses.   Skin: Trace lower extremity edema, Warm, Dry, No erythema, No rash.    Musculoskeletal: No major deformities noted.  Intact distal pulses  Neurologic: Awake, alert. Moves all extremities spontaneously.  Psychiatric: Affect normal, Judgment normal, Mood normal.      DIAGNOSTIC STUDIES    Labs:   Results for orders placed or performed during the hospital encounter of 11/15/23   CBC WITH DIFFERENTIAL   Result Value Ref Range    WBC 6.7 4.8 - 10.8 K/uL    RBC 4.38 4.20 - 5.40 M/uL    Hemoglobin 14.4 12.0 - 16.0 g/dL    Hematocrit 39.6 37.0 - 47.0 %    MCV 90.4 81.4 - 97.8 fL    MCH 32.9 27.0 - 33.0 pg    MCHC 36.4 (H) 32.2 - 35.5 g/dL    RDW 41.3 35.9 - 50.0 fL    Platelet Count 168 164 - 446 K/uL    MPV 11.8 9.0 - 12.9 fL    Neutrophils-Polys 49.80 44.00 - 72.00 %    Lymphocytes 41.50 (H) 22.00 - 41.00 %    Monocytes 7.00 0.00 - 13.40 %    Eosinophils 0.70 0.00 - 6.90 %    Basophils 0.60 0.00 - 1.80 %    Immature Granulocytes 0.40 0.00 - 0.90 %    Nucleated RBC 0.00 0.00 - 0.20 /100 WBC    Neutrophils (Absolute) 3.34 1.82 - 7.42 K/uL    " Lymphs (Absolute) 2.79 1.00 - 4.80 K/uL    Monos (Absolute) 0.47 0.00 - 0.85 K/uL    Eos (Absolute) 0.05 0.00 - 0.51 K/uL    Baso (Absolute) 0.04 0.00 - 0.12 K/uL    Immature Granulocytes (abs) 0.03 0.00 - 0.11 K/uL    NRBC (Absolute) 0.00 K/uL   Comp Metabolic Panel   Result Value Ref Range    Sodium 140 135 - 145 mmol/L    Potassium 3.6 3.6 - 5.5 mmol/L    Chloride 112 96 - 112 mmol/L    Co2 14 (L) 20 - 33 mmol/L    Anion Gap 14.0 7.0 - 16.0    Glucose 98 65 - 99 mg/dL    Bun 17 8 - 22 mg/dL    Creatinine 0.86 0.50 - 1.40 mg/dL    Calcium 8.9 8.5 - 10.5 mg/dL    Correct Calcium 8.4 (L) 8.5 - 10.5 mg/dL    AST(SGOT) 16 12 - 45 U/L    ALT(SGPT) 27 2 - 50 U/L    Alkaline Phosphatase 58 30 - 99 U/L    Total Bilirubin 0.3 0.1 - 1.5 mg/dL    Albumin 4.6 3.2 - 4.9 g/dL    Total Protein 6.8 6.0 - 8.2 g/dL    Globulin 2.2 1.9 - 3.5 g/dL    A-G Ratio 2.1 g/dL   TROPONIN   Result Value Ref Range    Troponin T <6 6 - 19 ng/L   LACTIC ACID   Result Value Ref Range    Lactic Acid 1.8 0.5 - 2.0 mmol/L   URINALYSIS    Specimen: Urine   Result Value Ref Range    Color Yellow     Character Clear     Specific Gravity 1.017 <1.035    Ph 5.0 5.0 - 8.0    Glucose Negative Negative mg/dL    Ketones Negative Negative mg/dL    Protein Negative Negative mg/dL    Bilirubin Negative Negative    Urobilinogen, Urine 0.2 Negative    Nitrite Negative Negative    Leukocyte Esterase Negative Negative    Occult Blood Negative Negative    Micro Urine Req see below    HCG QUAL SERUM   Result Value Ref Range    Beta-Hcg Qualitative Serum Negative Negative   CoV-2, Flu A/B, And RSV by PCR ([place])    Specimen: Respirate   Result Value Ref Range    Influenza virus A RNA Negative Negative    Influenza virus B, PCR Negative Negative    RSV, PCR Negative Negative    SARS-CoV-2 by PCR NotDetected     SARS-CoV-2 Source NP Swab    ESTIMATED GFR   Result Value Ref Range    GFR (CKD-EPI) 91 >60 mL/min/1.73 m 2   EKG   Result Value Ref Range    Report        [facility] Emergency Dept.    Test Date:  2023-11-15  Pt Name:    HARLEY DE LA CRUZ              Department: ER  MRN:        [id number]                      Room:  Gender:     Female                       Technician: [id number]  :        [id number]                   Requested By:ER TRIAGE PROTOCOL  Order #:    [id number]                    Reading MD: JAYLON DUVALL MD    Measurements  Intervals                                Axis  Rate:       91                           P:          22  WV:         142                          QRS:        5  QRSD:       97                           T:          -12  QT:         443  QTc:        546    Interpretive Statements  Sinus rhythm  Borderline T abnormalities, diffuse leads  Prolonged QT interval  Compared to ECG 2023 15:11:31  T-wave abnormality now present  Prolonged QT interval now present  Electronically Signed On 11- 19:05:49 PST by JAYLON DUVALL MD       *Note: Due to a large number of results and/or encounters for the requested time period, some results have not been displayed. A complete set of results can be found in Results Review.       Radiology:   This attending emergency physician has independently interpreted the diagnostic imaging associated with this visit and is awaiting the final reading from the radiologist.   Preliminary interpretation is a follows: No pneumonia  Radiologist interpretation:   DX-CHEST-LIMITED (1 VIEW)   Final Result      No acute cardiopulmonary abnormality.           COURSE & MEDICAL DECISION MAKING     ED Observation Status?  No    INITIAL ASSESSMENT, COURSE AND PLAN  Differential diagnoses include but not limited to: Sepsis, pneumonia, asthma exacerbation     Care Narrative: To the ED with shortness of breath.  The patient is been short of breath over the last several days.  The patient is to use multiple nebulizers.  Give the patient an hour-long continuous nebulizer, steroids.  The patient does not  have COVID, no pneumonia.  The patient is feeling improved.  She states there is difficulty getting her albuterol at home.  I have talked to , sent in prescriptions.  If there is any problems with discharge my partner will take care of it.    7:10 PM - Patient was evaluated at bedside for shortness of breath. Ordered for EKG, DX- Chest, CoV-2, Flu A/B, RSV by PCR, HCG Qual, Lactic acid, urinalysis, urine culture, CBC with diff, CMP, blood culture x 2, and troponin to evaluate. The patient will be medicated with 60 mg Deltasone, Atrovent, and Proventil for her symptoms. Patient verbalizes understanding and support with my plan of care.     HTN/IDDM FOLLOW UP:  The patient has known hypertension and is being followed by their primary care doctor    DISPOSITION AND DISCUSSIONS    Discussion of management with other QHP or appropriate source(s): RT Breathing treatment and social work      Escalation of care considered, and ultimately not performed: acute inpatient care management, however at this time, the patient is most appropriate for outpatient management.    Barriers to care at this time, including but not limited to:  Difficulty getting prescriptions filled .      The patient will return for new or worsening symptoms and is stable at the time of discharge.    The patient is referred to a primary physician for blood pressure management, diabetic screening, and for all other preventative health concerns.        DISPOSITION:  Patient will be discharged home in stable condition.    FOLLOW UP:  [facility], Emergency Dept  [address] [id number] [phone]    If symptoms worsen    Torres Brody M.D.  [address]  [facility] [id number] [phone]            OUTPATIENT MEDICATIONS:  New Prescriptions    ALBUTEROL (PROVENTIL) 2.5MG/3ML NEBU SOLN SOLUTION FOR NEBULIZATION    Take 3 mL by nebulization every four hours as needed for Shortness of Breath.     PREDNISONE (DELTASONE) 20 MG TAB    Take 3 Tablets by mouth every day for 4 days.         FINAL DIAGNOSIS  1. Severe persistent asthma with acute exacerbation         INasima (Scribe), am scribing for, and in the presence of, Emanuel Díaz M.D..    Electronically signed by: Nasima West (Scribe), 11/15/2023    Emanuel RODRIGUEZ M.D. personally performed the services described in this documentation, as scribed by Nasima West in my presence, and it is both accurate and complete.      The note accurately reflects work and decisions made by me.  Emanuel Díaz M.D.  11/15/2023  9:25 PM

## 2023-11-16 NOTE — ED NOTES
Discharge instructions given to pt. Pt verbalized understanding. All questions have been addressed. PIV removed. Discharged pt in stable condition.

## 2023-11-16 NOTE — DISCHARGE PLANNING
KRYSTIAN asked to assist with obtaining cost of prescriptions by ERP.  KRYSTIAN called [facility] and was informed that they do not have her Medicare Part D on file however the cash prices are $12.39 for the prednisone and $17.99 for the albuterol.  KRYSTIAN went to speak with the Pt about these prices and found that the Pt was already discharged.

## 2023-11-16 NOTE — ED TRIAGE NOTES
Pt ambulated to triage with   Chief Complaint   Patient presents with    Chest Pain     Tightness in chest, shallow breaths, with SOB and h/o asthma and reacting .  Also h/o POTS.     Shortness of Breath     4 duonebs and 6 puffs albuterol,      Pt Informed regarding triage process and verbalized understanding to inform triage tech or RN for any changes in condition. Placed in lobby.

## 2023-11-17 LAB
BACTERIA UR CULT: NORMAL
SIGNIFICANT IND 70042: NORMAL
SITE SITE: NORMAL
SOURCE SOURCE: NORMAL

## 2023-11-30 NOTE — ASSESSMENT & PLAN NOTE
Reports maroon stools for past 2-3 weeks. External/internal hemorrhoid, without rectal mass, no brandon bleeding, scant stool in rectal vault, guaiac positive. CT abd/pelvis without intraabdominal findings, diverticuli. Hgb, hemodynamically stable.   Monitor, possibly deserves outpatient follow-up   We discussed the epidemiology, pathogenesis and etiology of pelvic organ prolapse. This is a chronic condition that has progressed. We discussed the potential risk factors which include genetics and environmental factors, such as childbirth, aging, menopause, straining, and previous surgery. I offered her management options which included nothing, pessary, and surgery. She is interested in: surgery    Decisions regarding major surgery were discussed today. She had a UDS with Nat Bolaños which showed + ERIN.

## 2023-12-11 ENCOUNTER — PATIENT MESSAGE (OUTPATIENT)
Dept: CARDIOLOGY | Facility: MEDICAL CENTER | Age: 33
End: 2023-12-11
Payer: MEDICARE

## 2023-12-11 DIAGNOSIS — G90.A POSTURAL ORTHOSTATIC TACHYCARDIA SYNDROME: ICD-10-CM

## 2023-12-11 RX ORDER — IVABRADINE 7.5 MG/1
7.5 TABLET, FILM COATED ORAL 2 TIMES DAILY WITH MEALS
Qty: 60 TABLET | Refills: 2 | Status: SHIPPED | OUTPATIENT
Start: 2023-12-11 | End: 2024-03-05

## 2023-12-12 ENCOUNTER — OFFICE VISIT (OUTPATIENT)
Dept: URGENT CARE | Facility: CLINIC | Age: 33
End: 2023-12-12
Payer: MEDICARE

## 2023-12-12 VITALS
HEART RATE: 67 BPM | DIASTOLIC BLOOD PRESSURE: 84 MMHG | OXYGEN SATURATION: 97 % | RESPIRATION RATE: 18 BRPM | SYSTOLIC BLOOD PRESSURE: 124 MMHG | TEMPERATURE: 98.4 F

## 2023-12-12 DIAGNOSIS — J01.90 ACUTE BACTERIAL SINUSITIS: ICD-10-CM

## 2023-12-12 DIAGNOSIS — B96.89 ACUTE BACTERIAL SINUSITIS: ICD-10-CM

## 2023-12-12 DIAGNOSIS — D84.9 IMMUNOSUPPRESSED STATUS (HCC): ICD-10-CM

## 2023-12-12 PROCEDURE — 99213 OFFICE O/P EST LOW 20 MIN: CPT | Performed by: NURSE PRACTITIONER

## 2023-12-12 PROCEDURE — 3074F SYST BP LT 130 MM HG: CPT | Performed by: NURSE PRACTITIONER

## 2023-12-12 PROCEDURE — 3079F DIAST BP 80-89 MM HG: CPT | Performed by: NURSE PRACTITIONER

## 2023-12-12 RX ORDER — AMOXICILLIN AND CLAVULANATE POTASSIUM 875; 125 MG/1; MG/1
1 TABLET, FILM COATED ORAL 2 TIMES DAILY
Qty: 14 TABLET | Refills: 0 | Status: SHIPPED | OUTPATIENT
Start: 2023-12-12 | End: 2023-12-19

## 2023-12-12 ASSESSMENT — ENCOUNTER SYMPTOMS
COUGH: 1
SINUS PRESSURE: 1

## 2023-12-12 NOTE — PROGRESS NOTES
"Subjective     Kristin Balderrama is a 33 y.o. female who presents with Sinus Problem and Otalgia            Sinus Problem  This is a new problem. Episode onset: pt reports new onset of sinus congestion and pressure that started 4 days ago. endorses thick yellow discharge from nose and coughing it up as well. associated ear pain bilaterally. no fevers, ST or chills. Associated symptoms include congestion, coughing, ear pain and sinus pressure. Treatments tried: dayquil. The treatment provided no relief.       Review of Systems   HENT:  Positive for congestion, ear pain and sinus pressure.    Respiratory:  Positive for cough.    All other systems reviewed and are negative.         Past Medical History:   Diagnosis Date    Abdominal pain     Anginal syndrome     random chest pain especially with tachycardia    Apnea, sleep     Arrhythmia     \"sinus tachycardia\", cariologist, Dr. Kumar; ablation 2/2016    Arthritis     osteo    ASTHMA     when around smoke    Back pain     Borderline personality disorder (HCC)     Breath shortness     with tachycardia    Bronchitis 02/08/2022    Last time was 12/21    Cardiac arrhythmia     Chickenpox     Chronic UTI 09/18/2010    Cough     Daytime sleepiness     Dental disorder 03/08/2021    infected tooth    Depression     Diabetes (HCC)     Diarrhea     incontinece    Disorder of thyroid     Hashimoto's    Fall     Fatigue     Frequent headaches     Gasping for breath     Gynecological disorder     PCOS    Headache(784.0)     Heart burn     Heart murmur     History of falling     Indigestion     Migraine     Mitochondrial disease (HCC)     Multiple personality disorder (HCC)     Nausea     Obesity     Other fatigue 06/29/2020    Pain 08/15/2012    back, 7/10    Painful joint     PCOS (polycystic ovarian syndrome)     Pneumonia 2012 12/2020    POTS (postural orthostatic tachycardia syndrome)     Psychosis (HCC)     Ringing in ears     Scoliosis     Shortness of breath     O2 as " "needed    Sinus tachycardia 10/31/2013    Sleep apnea     CPAP \"pulmonary doctor took me off mid year 2016\"    Snoring     Supraventricular tachycardia 4/10/2019    Tonsillitis     Transverse myelitis (HCC)     2/8/22: Per pt: not anymore    Tuberculosis     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    Urinary bladder disorder     Suprapubic cath. 2/8/22: Not anymore.     Urinary incontinence     Weakness     Wears glasses       Past Surgical History:   Procedure Laterality Date    INGUINAL HERNIA LAPAROSCOPIC Right 07/21/2023    Procedure: LAPAROSCOPIC RIGHT INGUINAL HERNIA REPAIR WITH MESH;  Surgeon: Joe Noyola M.D.;  Location: [facility];  Service: General    HERNIA REPAIR Right 07/21/2023    PB PERCUT FIX PBOX/NECK FEMUR FX Left 01/28/2023    Procedure: FIXATION, HIP, USING CANNULATED SCREW;  Surgeon: Noman Abdul M.D.;  Location: [facility];  Service: Orthopedics    ID LAP,DIAGNOSTIC ABDOMEN  02/14/2022    Procedure: LAPAROSCOPY;  Surgeon: Seamus Pisano M.D.;  Location: SURGERY SAME DAY [facility];  Service: Gynecology    OVARIAN CYSTECTOMY Right 02/14/2022    Procedure: EXCISION, CYST, OVARY;  Surgeon: Seamus Pisano M.D.;  Location: SURGERY SAME DAY [facility];  Service: Gynecology    BOWEL STIMULATOR INSERTION  03/10/2021    Procedure: INSERTION, ELECTRODE LEADS AND PULSE GENERATOR, NEUROSTIMULATOR, SACRAL - REMOVAL OF INTERSTIM WITH REPLACEMENT OF SACRAL NEUROMODULATION DEVICE;  Surgeon: Joe Noyola M.D.;  Location: [facility];  Service: General    MUSCLE BIOPSY Right 01/26/2017    Procedure: MUSCLE BIOPSY - THIGH;  Surgeon: Isidro Vigil M.D.;  Location: [facility];  Service:     GASTROSCOPY WITH BALLOON DILATATION N/A 01/04/2017    Procedure: GASTROSCOPY WITH DILATATION;  Surgeon: Torres Vargas M.D.;  Location: [facility];  Service:     BOWEL STIMULATOR INSERTION  07/13/2016    Procedure: BOWEL STIMULATOR INSERTION FOR PERMANENT " INTERSTIM SACRAL IMPLANT;  Surgeon: Joe Noyola M.D.;  Location: SURGERY [facility];  Service:     RECOVERY  01/27/2016    Procedure: CATH LAB EP STUDY WITH SINUS NODE MODIFICATION ABHINAV;  Surgeon: Recoveryonly Surgery;  [place] [facility]:     OTHER CARDIAC SURGERY  01/2016    cardiac ablation    NEURO DEST FACET L/S W/IG SNGL  04/21/2015    Performed by Reza Tabor at SUR[facility]ERY [facility]    LUMBAR FUSION ANTERIOR  08/21/2012    Performed by SUSIE SAWANT at SURGERY [facility]    ALYSSA BY LAPAROSCOPY  08/29/2010    Performed by SHAYY JOHNS at SURGERY [facility]    LAMINOTOMY      OTHER ABDOMINAL SURGERY      TONSILLECTOMY      tonsillectomy      Social History     Socioeconomic History    Marital status: Single     Spouse name: Not on file    Number of children: Not on file    Years of education: Not on file    Highest education level: Not on file   Occupational History    Not on file   Tobacco Use    Smoking status: Never    Smokeless tobacco: Never   Vaping Use    Vaping Use: Never used   Substance and Sexual Activity    Alcohol use: No     Alcohol/week: 0.0 oz    Drug use: Not Currently     Frequency: 7.0 times per week    Sexual activity: Not Currently     Birth control/protection: Implant   Other Topics Concern    Not on file   Social History Narrative    ** Merged History Encounter **          Social Determinants of Health     Financial Resource Strain: Medium Risk (4/30/2021)    Overall Financial Resource Strain (CARDIA)     Difficulty of Paying Living Expenses: Somewhat hard   Food Insecurity: Food Insecurity Present (4/30/2021)    Hunger Vital Sign     Worried About Running Out of Food in the Last Year: Sometimes true     Ran Out of Food in the Last Year: Sometimes true   Transportation Needs: No Transportation Needs (2/13/2023)    PRAPARE - Transportation     Lack of Transportation (Medical): No     Lack of Transportation (Non-Medical): No    Physical Activity: Not on file   Stress: Not on file   Social Connections: Not on file   Intimate Partner Violence: Not on file   Housing Stability: Low Risk  (9/29/2020)    Housing Stability Vital Sign     Unable to Pay for Housing in the Last Year: No     Number of Places Lived in the Last Year: 1     Unstable Housing in the Last Year: No         Objective     /84   Pulse 67   Temp 36.9 °C (98.4 °F)   Resp 18   SpO2 97%      Physical Exam  Vitals and nursing note reviewed.   Constitutional:       Appearance: Normal appearance. She is normal weight.   HENT:      Head: Normocephalic and atraumatic.      Right Ear: Tympanic membrane and external ear normal.      Left Ear: Tympanic membrane and external ear normal.      Nose: Congestion and rhinorrhea present.      Right Sinus: Maxillary sinus tenderness present.      Left Sinus: Maxillary sinus tenderness present.      Mouth/Throat:      Mouth: Mucous membranes are moist.      Pharynx: Oropharynx is clear.   Eyes:      Extraocular Movements: Extraocular movements intact.      Pupils: Pupils are equal, round, and reactive to light.   Cardiovascular:      Rate and Rhythm: Normal rate and regular rhythm.   Pulmonary:      Effort: Pulmonary effort is normal.      Breath sounds: Normal breath sounds.   Musculoskeletal:         General: Normal range of motion.      Cervical back: Normal range of motion.   Skin:     General: Skin is warm and dry.      Capillary Refill: Capillary refill takes less than 2 seconds.   Neurological:      General: No focal deficit present.      Mental Status: She is alert and oriented to person, place, and time. Mental status is at baseline.   Psychiatric:         Mood and Affect: Mood normal.         Speech: Speech normal.         Thought Content: Thought content normal.         Judgment: Judgment normal.                             Assessment & Plan        1. Acute bacterial sinusitis  - amoxicillin-clavulanate (AUGMENTIN) 013-109  MG Tab; Take 1 Tablet by mouth 2 times a day for 7 days.  Dispense: 14 Tablet; Refill: 0    2. Immunosuppressed status (HCC)       Per patient she is not allergic to amox/clav and can tolerate it just fine if she takes benadryl twice daily  Encouraged her to continue OTC meds as needed for relief  She is immunocompromised and is currently on humira  Encouraged rest and fluids  Supportive care, differential diagnoses, and indications for immediate follow-up discussed with patient.    Pathogenesis of diagnosis discussed including typical length and natural progression.    Instructed to return to  or nearest emergency department if symptoms fail to improve, for any change in condition, further concerns, or new concerning symptoms.  Patient states understanding of the plan of care and discharge instructions.

## 2023-12-23 ENCOUNTER — HOSPITAL ENCOUNTER (EMERGENCY)
Facility: MEDICAL CENTER | Age: 33
End: 2023-12-23
Attending: EMERGENCY MEDICINE
Payer: MEDICARE

## 2023-12-23 ENCOUNTER — APPOINTMENT (OUTPATIENT)
Dept: RADIOLOGY | Facility: MEDICAL CENTER | Age: 33
End: 2023-12-23
Attending: EMERGENCY MEDICINE
Payer: MEDICARE

## 2023-12-23 VITALS
OXYGEN SATURATION: 97 % | SYSTOLIC BLOOD PRESSURE: 180 MMHG | BODY MASS INDEX: 44.56 KG/M2 | RESPIRATION RATE: 21 BRPM | HEART RATE: 107 BPM | WEIGHT: 261 LBS | HEIGHT: 64 IN | TEMPERATURE: 98.4 F | DIASTOLIC BLOOD PRESSURE: 77 MMHG

## 2023-12-23 DIAGNOSIS — J10.1 INFLUENZA A: ICD-10-CM

## 2023-12-23 DIAGNOSIS — Z87.09 HISTORY OF ASTHMA: ICD-10-CM

## 2023-12-23 DIAGNOSIS — E87.6 HYPOKALEMIA: ICD-10-CM

## 2023-12-23 DIAGNOSIS — R06.00 ACUTE DYSPNEA: ICD-10-CM

## 2023-12-23 DIAGNOSIS — B34.9 VIRAL SYNDROME: ICD-10-CM

## 2023-12-23 LAB
ALBUMIN SERPL BCP-MCNC: 4.7 G/DL (ref 3.2–4.9)
ALBUMIN/GLOB SERPL: 2.4 G/DL
ALP SERPL-CCNC: 73 U/L (ref 30–99)
ALT SERPL-CCNC: 29 U/L (ref 2–50)
ANION GAP SERPL CALC-SCNC: 14 MMOL/L (ref 7–16)
AST SERPL-CCNC: 16 U/L (ref 12–45)
BASOPHILS # BLD AUTO: 0.3 % (ref 0–1.8)
BASOPHILS # BLD: 0.01 K/UL (ref 0–0.12)
BILIRUB SERPL-MCNC: 0.3 MG/DL (ref 0.1–1.5)
BUN SERPL-MCNC: 12 MG/DL (ref 8–22)
CALCIUM ALBUM COR SERPL-MCNC: 8.6 MG/DL (ref 8.5–10.5)
CALCIUM SERPL-MCNC: 9.2 MG/DL (ref 8.5–10.5)
CHLORIDE SERPL-SCNC: 108 MMOL/L (ref 96–112)
CO2 SERPL-SCNC: 17 MMOL/L (ref 20–33)
CREAT SERPL-MCNC: 0.78 MG/DL (ref 0.5–1.4)
EKG IMPRESSION: NORMAL
EOSINOPHIL # BLD AUTO: 0.04 K/UL (ref 0–0.51)
EOSINOPHIL NFR BLD: 1.2 % (ref 0–6.9)
ERYTHROCYTE [DISTWIDTH] IN BLOOD BY AUTOMATED COUNT: 40 FL (ref 35.9–50)
FLUAV RNA SPEC QL NAA+PROBE: POSITIVE
FLUBV RNA SPEC QL NAA+PROBE: NEGATIVE
GFR SERPLBLD CREATININE-BSD FMLA CKD-EPI: 102 ML/MIN/1.73 M 2
GLOBULIN SER CALC-MCNC: 2 G/DL (ref 1.9–3.5)
GLUCOSE SERPL-MCNC: 134 MG/DL (ref 65–99)
HCT VFR BLD AUTO: 43.8 % (ref 37–47)
HGB BLD-MCNC: 15.2 G/DL (ref 12–16)
IMM GRANULOCYTES # BLD AUTO: 0.02 K/UL (ref 0–0.11)
IMM GRANULOCYTES NFR BLD AUTO: 0.6 % (ref 0–0.9)
LYMPHOCYTES # BLD AUTO: 0.48 K/UL (ref 1–4.8)
LYMPHOCYTES NFR BLD: 14.8 % (ref 22–41)
MCH RBC QN AUTO: 31.3 PG (ref 27–33)
MCHC RBC AUTO-ENTMCNC: 34.7 G/DL (ref 32.2–35.5)
MCV RBC AUTO: 90.1 FL (ref 81.4–97.8)
MONOCYTES # BLD AUTO: 0.36 K/UL (ref 0–0.85)
MONOCYTES NFR BLD AUTO: 11.1 % (ref 0–13.4)
NEUTROPHILS # BLD AUTO: 2.34 K/UL (ref 1.82–7.42)
NEUTROPHILS NFR BLD: 72 % (ref 44–72)
NRBC # BLD AUTO: 0 K/UL
NRBC BLD-RTO: 0 /100 WBC (ref 0–0.2)
PLATELET # BLD AUTO: 127 K/UL (ref 164–446)
PMV BLD AUTO: 11.8 FL (ref 9–12.9)
POTASSIUM SERPL-SCNC: 3.5 MMOL/L (ref 3.6–5.5)
PROT SERPL-MCNC: 6.7 G/DL (ref 6–8.2)
RBC # BLD AUTO: 4.86 M/UL (ref 4.2–5.4)
RSV RNA SPEC QL NAA+PROBE: NEGATIVE
SARS-COV-2 RNA RESP QL NAA+PROBE: NOTDETECTED
SODIUM SERPL-SCNC: 139 MMOL/L (ref 135–145)
SPECIMEN SOURCE: ABNORMAL
WBC # BLD AUTO: 3.3 K/UL (ref 4.8–10.8)

## 2023-12-23 PROCEDURE — 700101 HCHG RX REV CODE 250: Mod: UD | Performed by: EMERGENCY MEDICINE

## 2023-12-23 PROCEDURE — A9270 NON-COVERED ITEM OR SERVICE: HCPCS | Mod: UD | Performed by: EMERGENCY MEDICINE

## 2023-12-23 PROCEDURE — 700105 HCHG RX REV CODE 258: Mod: UD | Performed by: EMERGENCY MEDICINE

## 2023-12-23 PROCEDURE — 85025 COMPLETE CBC W/AUTO DIFF WBC: CPT

## 2023-12-23 PROCEDURE — C9803 HOPD COVID-19 SPEC COLLECT: HCPCS | Performed by: EMERGENCY MEDICINE

## 2023-12-23 PROCEDURE — 80053 COMPREHEN METABOLIC PANEL: CPT

## 2023-12-23 PROCEDURE — 700111 HCHG RX REV CODE 636 W/ 250 OVERRIDE (IP): Mod: JZ,UD | Performed by: EMERGENCY MEDICINE

## 2023-12-23 PROCEDURE — 99285 EMERGENCY DEPT VISIT HI MDM: CPT

## 2023-12-23 PROCEDURE — 94640 AIRWAY INHALATION TREATMENT: CPT

## 2023-12-23 PROCEDURE — 96374 THER/PROPH/DIAG INJ IV PUSH: CPT

## 2023-12-23 PROCEDURE — 71045 X-RAY EXAM CHEST 1 VIEW: CPT

## 2023-12-23 PROCEDURE — 0241U HCHG SARS-COV-2 COVID-19 NFCT DS RESP RNA 4 TRGT MIC: CPT

## 2023-12-23 PROCEDURE — 700102 HCHG RX REV CODE 250 W/ 637 OVERRIDE(OP): Mod: UD | Performed by: EMERGENCY MEDICINE

## 2023-12-23 PROCEDURE — 36415 COLL VENOUS BLD VENIPUNCTURE: CPT

## 2023-12-23 PROCEDURE — 93005 ELECTROCARDIOGRAM TRACING: CPT

## 2023-12-23 RX ORDER — SODIUM CHLORIDE 9 MG/ML
INJECTION, SOLUTION INTRAVENOUS CONTINUOUS
Status: DISCONTINUED | OUTPATIENT
Start: 2023-12-23 | End: 2023-12-24 | Stop reason: HOSPADM

## 2023-12-23 RX ORDER — IPRATROPIUM BROMIDE AND ALBUTEROL SULFATE 2.5; .5 MG/3ML; MG/3ML
3 SOLUTION RESPIRATORY (INHALATION) ONCE
Status: COMPLETED | OUTPATIENT
Start: 2023-12-23 | End: 2023-12-23

## 2023-12-23 RX ORDER — KETOROLAC TROMETHAMINE 10 MG/1
10 TABLET, FILM COATED ORAL 3 TIMES DAILY PRN
Qty: 15 TABLET | Refills: 0 | Status: SHIPPED | OUTPATIENT
Start: 2023-12-23 | End: 2024-01-16

## 2023-12-23 RX ORDER — PREDNISONE 20 MG/1
40 TABLET ORAL DAILY
Qty: 12 TABLET | Refills: 0 | Status: SHIPPED | OUTPATIENT
Start: 2023-12-23 | End: 2023-12-29

## 2023-12-23 RX ORDER — DEXAMETHASONE SODIUM PHOSPHATE 4 MG/ML
12 INJECTION, SOLUTION INTRA-ARTICULAR; INTRALESIONAL; INTRAMUSCULAR; INTRAVENOUS; SOFT TISSUE ONCE
Status: COMPLETED | OUTPATIENT
Start: 2023-12-23 | End: 2023-12-23

## 2023-12-23 RX ORDER — POTASSIUM CHLORIDE 20 MEQ/1
40 TABLET, EXTENDED RELEASE ORAL ONCE
Status: COMPLETED | OUTPATIENT
Start: 2023-12-23 | End: 2023-12-23

## 2023-12-23 RX ADMIN — IPRATROPIUM BROMIDE AND ALBUTEROL SULFATE 3 ML: 2.5; .5 SOLUTION RESPIRATORY (INHALATION) at 22:17

## 2023-12-23 RX ADMIN — DEXAMETHASONE SODIUM PHOSPHATE 12 MG: 4 INJECTION INTRA-ARTICULAR; INTRALESIONAL; INTRAMUSCULAR; INTRAVENOUS; SOFT TISSUE at 19:36

## 2023-12-23 RX ADMIN — POTASSIUM CHLORIDE 40 MEQ: 1500 TABLET, EXTENDED RELEASE ORAL at 21:33

## 2023-12-23 RX ADMIN — IPRATROPIUM BROMIDE AND ALBUTEROL SULFATE 3 ML: 2.5; .5 SOLUTION RESPIRATORY (INHALATION) at 19:23

## 2023-12-23 RX ADMIN — SODIUM CHLORIDE: 9 INJECTION, SOLUTION INTRAVENOUS at 19:37

## 2023-12-23 ASSESSMENT — FIBROSIS 4 INDEX: FIB4 SCORE: 0.62

## 2023-12-24 NOTE — ED NOTES
PT DISCHARGE TO HOME PER MD. PT AMBULATORY WITH A STEADY GAIT. PT GIVEN D/C INSTRUCTIONS AND PT VERBALIZED AN UNDERSTANDING. PT WHEELED TO CAR.

## 2023-12-24 NOTE — ED PROVIDER NOTES
"ER Provider Note    Scribed for Dr. Sonal Bryant D.O. by Nilsa Vigil. 12/23/2023  6:29 PM    Primary Care Provider: Torres Brody M.D.    CHIEF COMPLAINT  Chief Complaint   Patient presents with    Flu Like Symptoms    Shortness of Breath     EXTERNAL RECORDS REVIEWED  Outpatient Notes Patient was seen at urgent care on 12/12/23 for acute bacterial sinusitis. She was subsequently discharged with antibiotic treatment.     HPI/ROS  LIMITATION TO HISTORY   Select: : None    OUTSIDE HISTORIAN(S):  None    Kristin Balderrama is a 33 y.o. female with a history of bronchitis, diabetes, asthma and arrhythmia who presents to the ED via EMS for flu like symptoms onset ten days ago. She states her symptoms started on her sinuses and developed to upper respiratory. Patient reports her symptoms have continued to worsen. Patient was given albuterol treatment en route by EMS, with mild alleviation. She has associated shortness of breath, coughing and diarrhea. Patient denies fever. Patient reports she takes Humira for hydradenitis suppurativa. She reports she is not coughing anything up or any nausea. She states she is currently living with her family. She reports a history of hip surgery. Patient has an extensive drug allergy history.    PAST MEDICAL HISTORY  Past Medical History:   Diagnosis Date    Abdominal pain     Anginal syndrome     random chest pain especially with tachycardia    Apnea, sleep     Arrhythmia     \"sinus tachycardia\", cariologist, Dr. Kumar; ablation 2/2016    Arthritis     osteo    ASTHMA     when around smoke    Back pain     Borderline personality disorder (HCC)     Breath shortness     with tachycardia    Bronchitis 02/08/2022    Last time was 12/21    Cardiac arrhythmia     Chickenpox     Chronic UTI 09/18/2010    Cough     Daytime sleepiness     Dental disorder 03/08/2021    infected tooth    Depression     Diabetes (HCC)     Diarrhea     incontinece    Disorder of thyroid     Hashimoto's    " "Fall     Fatigue     Frequent headaches     Gasping for breath     Gynecological disorder     PCOS    Headache(784.0)     Heart burn     Heart murmur     History of falling     Indigestion     Migraine     Mitochondrial disease (HCC)     Multiple personality disorder (HCC)     Nausea     Obesity     Other fatigue 06/29/2020    Pain 08/15/2012    back, 7/10    Painful joint     PCOS (polycystic ovarian syndrome)     Pneumonia 2012 12/2020    POTS (postural orthostatic tachycardia syndrome)     Psychosis (HCC)     Ringing in ears     Scoliosis     Shortness of breath     O2 as needed    Sinus tachycardia 10/31/2013    Sleep apnea     CPAP \"pulmonary doctor took me off mid year 2016\"    Snoring     Supraventricular tachycardia 4/10/2019    Tonsillitis     Transverse myelitis (HCC)     2/8/22: Per pt: not anymore    Tuberculosis     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    Urinary bladder disorder     Suprapubic cath. 2/8/22: Not anymore.     Urinary incontinence     Weakness     Wears glasses      SURGICAL HISTORY  Past Surgical History:   Procedure Laterality Date    INGUINAL HERNIA LAPAROSCOPIC Right 07/21/2023    Procedure: LAPAROSCOPIC RIGHT INGUINAL HERNIA REPAIR WITH MESH;  Surgeon: Joe Noyola M.D.;  Location: SURGERY [facility];  Service: General    HERNIA REPAIR Right 07/21/2023    PB PERCUT FIX PBOX/NECK FEMUR FX Left 01/28/2023    Procedure: FIXATION, HIP, USING CANNULATED SCREW;  Surgeon: Noman Abdul M.D.;  Location: [facility];  Service: Orthopedics    WA LAP,DIAGNOSTIC ABDOMEN  02/14/2022    Procedure: LAPAROSCOPY;  Surgeon: Seamus Pisano M.D.;  Location: SURGERY SAME DAY [facility];  Service: Gynecology    OVARIAN CYSTECTOMY Right 02/14/2022    Procedure: EXCISION, CYST, OVARY;  Surgeon: Seamus Pisano M.D.;  Location: SURGERY SAME DAY [facility];  Service: Gynecology    BOWEL STIMULATOR INSERTION  03/10/2021    Procedure: INSERTION, ELECTRODE LEADS AND PULSE GENERATOR, " NEUROSTIMULATOR, SACRAL - REMOVAL OF INTERSTIM WITH REPLACEMENT OF SACRAL NEUROMODULATION DEVICE;  Surgeon: Joe Noyola M.D.;  Location: SURGERY [facility];  Service: General    MUSCLE BIOPSY Right 01/26/2017    Procedure: MUSCLE BIOPSY - THIGH;  Surgeon: Isidro Vigil M.D.;  Location: SURGERY [facility];  Service:     GASTROSCOPY WITH BALLOON DILATATION N/A 01/04/2017    Procedure: GASTROSCOPY WITH DILATATION;  Surgeon: Torres Vargas M.D.;  Location: SURGERY [facility];  Service:     BOWEL STIMULATOR INSERTION  07/13/2016    Procedure: BOWEL STIMULATOR INSERTION FOR PERMANENT INTERSTIM SACRAL IMPLANT;  Surgeon: Joe Noyola M.D.;  Location: SURGERY [facility];  Service:     RECOVERY  01/27/2016    Procedure: CATH LAB EP STUDY WITH SINUS NODE MODIFICATION LA;  Surgeon: [facility];  Location: SURGERY PRE-POST PROC UNIT [facility];  Service:     OTHER CARDIAC SURGERY  01/2016    cardiac ablation    NEURO DEST FACET L/S W/IG SNGL  04/21/2015    Performed by Reza Tabor at SURGERY [facility]    LUMBAR FUSION ANTERIOR  08/21/2012    Performed by SUSIE SAWANT at SURGERY [facility]    ALYSSA BY LAPAROSCOPY  08/29/2010    Performed by SHAYY JOHNS at SURGERY [facility]    LAMINOTOMY      OTHER ABDOMINAL SURGERY      TONSILLECTOMY      tonsillectomy     FAMILY HISTORY  Family History   Problem Relation Age of Onset    Hypertension Mother     Sleep Apnea Mother     Heart Disease Mother     Other Mother         hypothryod    Hypertension Maternal Uncle     Heart Disease Maternal Grandmother     Hypertension Maternal Grandmother     No Known Problems Sister     Other Sister         Narcolepsy;fibromyalsia;bone;nerve    Genitourinary () Problems Sister         endometriosis     SOCIAL HISTORY   reports that she has never smoked. She has never used smokeless tobacco. She reports that she does not drink alcohol and does not use drugs.    CURRENT MEDICATIONS  Previous  Medications    ADALIMUMAB 80 MG/0.8ML PEN-INJECTOR KIT    Inject 80 mg under the skin every 14 days.    ALBUTEROL (PROVENTIL) 2.5MG/3ML NEBU SOLN SOLUTION FOR NEBULIZATION    Take 3 mL by nebulization every four hours as needed for Shortness of Breath.    ALBUTEROL 108 (90 BASE) MCG/ACT AERO SOLN INHALATION AEROSOL    Inhale 2 Puffs every 6 hours as needed for Shortness of Breath.    AZITHROMYCIN (ZITHROMAX) 250 MG TAB    Take two tabs on day one followed by one tab on days 2-5.    BENZONATATE (TESSALON) 200 MG CAPSULE    Take 1 Capsule by mouth 3 times a day as needed for Cough.    CLINDAMYCIN PHOSPHATE,TOPICAL, 1 % LOTION    Apply to skin as directed for folliculitis x 10 days.    DIPHENHYDRAMINE (BENADRYL) 50 MG TABLET    Take 50 mg by mouth at bedtime.    DOCUSATE SODIUM (STOOL SOFTENER) 250 MG CAPSULE    Take 250 mg by mouth 2 times a day.    ETONOGESTREL (NEXPLANON) 68 MG IMPLANT IMPLANT    Inject 1 Each under the skin see administration instructions. Pt reports she is not sure when she had this medications injected last, pt reports she still has it in her arm  Every 3 years to change    FLUCONAZOLE (DIFLUCAN) 150 MG TABLET    Take 150 mg by mouth every Wednesday.    FLUTICASONE (FLONASE) 50 MCG/ACT NASAL SPRAY    Administer 2 Sprays into affected nostril(S) at bedtime.    GALCANEZUMAB-GNLM (EMGALITY) 120 MG/ML SOLUTION AUTO-INJECTOR    Inject  under the skin.    IVABRADINE (CORLANOR) 7.5 MG TAB TABLET    Take 1 Tablet by mouth 2 times a day with meals.    KETOROLAC (ACULAR) 0.5 % SOLUTION    Administer 1 Drop into both eyes 4 times a day.    MELATONIN 10 MG TAB    Take 10 mg by mouth at bedtime.    METOPROLOL SR (TOPROL XL) 25 MG TABLET SR 24 HR    Take 1 Tablet by mouth every day.    MUPIROCIN (BACTROBAN) 2 % OINTMENT    Apply 1 Application topically 2 times a day.    NAPROXEN (NAPROSYN) 500 MG TAB    Take 1 Tablet by mouth 2 times a day with meals.    PREGABALIN (LYRICA) 75 MG CAP    1 CAPSULE TWICE A DAY  "QTY 60 FOR 30 DAY    SODIUM CHLORIDE (SALT) 1 GM TAB    Take 1 Tablet by mouth 3 times a day with meals.    TIZANIDINE (ZANAFLEX) 4 MG TAB    Take 4 mg by mouth 3 times a day.    TOPIRAMATE (TOPAMAX) 25 MG TAB    Take 25-50 mg by mouth 2 times a day. 1 tablet (25 mg) in the morning 2 tablets (50 mg) at bedtime    TOPIRAMATE (TOPAMAX) 25 MG TAB    Take 25 Tablets by mouth 2 times a day.    ZIPRASIDONE (GEODON) 40 MG CAP    Take 1 capsule by mouth at bedtime.     ALLERGIES  Cefdinir, Depakote [divalproex sodium], Doxycycline, Montelukast [singulair], Vancomycin, Wound dressing adhesive, Amitriptyline, Aripiprazole, Aripiprazole [abilify], Clindamycin, Flomax [tamsulosin hydrochloride], Levaquin, Metformin, Tamsulosin, Tape, Amoxicillin, Depakote [divalproex sodium], Erythromycin, Hydromorphone, Montelukast, Ampicillin, Ciprofloxacin, Keflex, Levofloxacin, Metronidazole, Penicillins, Sulfa drugs, and Valproic acid    PHYSICAL EXAM  /86   Pulse 93   Temp 36.8 °C (98.2 °F) (Temporal)   Resp 20   Ht 1.626 m (5' 4\")   Wt 118 kg (261 lb)   SpO2 96%   BMI 44.80 kg/m²   Constitutional: Patient is morbidly obese Non-toxic appearing. Moderate respiratory distress. 3 word sentences, audible wheezing.   HENT: Normocephalic,  TM's visualized are clear bilaterally without erythema. Nose normal with no mucosal edema or drainage. Nares are patent and clear. Oral mucosa dry. Oropharynx without erythema or exudates.   Cardiovascular: Tachycardic with no murmur  Thorax & Lungs: Moderate respiratory distress, no rhonchi, or rales. Audible wheezing. Diminished air exchange with tight expiratory wheezing  Abdomen: Bowel sounds normal in all four quadrants. Soft,nontender, no rebound , guarding, palpable masses. Morbidly obese  Skin: Warm, Dry, Pale.    Extremities: Peripheral pulses 4/4 No edema, No tenderness    Neurologic: Alert & oriented x 3, Normal motor function, Normal sensory function  Psychiatric: Affect normal, " Judgment normal, Mood normal.     DIAGNOSTIC STUDIES & PROCEDURES  Labs:   Results for orders placed or performed during the hospital encounter of 12/23/23   CoV-2, Flu A/B, And RSV by PCR ([employer])    Specimen: Nasopharyngeal; Respirate   Result Value Ref Range    Influenza virus A RNA POSITIVE (A) Negative    Influenza virus B, PCR Negative Negative    RSV, PCR Negative Negative    SARS-CoV-2 by PCR NotDetected     SARS-CoV-2 Source NP Swab    CBC WITH DIFFERENTIAL   Result Value Ref Range    WBC 3.3 (L) 4.8 - 10.8 K/uL    RBC 4.86 4.20 - 5.40 M/uL    Hemoglobin 15.2 12.0 - 16.0 g/dL    Hematocrit 43.8 37.0 - 47.0 %    MCV 90.1 81.4 - 97.8 fL    MCH 31.3 27.0 - 33.0 pg    MCHC 34.7 32.2 - 35.5 g/dL    RDW 40.0 35.9 - 50.0 fL    Platelet Count 127 (L) 164 - 446 K/uL    MPV 11.8 9.0 - 12.9 fL    Neutrophils-Polys 72.00 44.00 - 72.00 %    Lymphocytes 14.80 (L) 22.00 - 41.00 %    Monocytes 11.10 0.00 - 13.40 %    Eosinophils 1.20 0.00 - 6.90 %    Basophils 0.30 0.00 - 1.80 %    Immature Granulocytes 0.60 0.00 - 0.90 %    Nucleated RBC 0.00 0.00 - 0.20 /100 WBC    Neutrophils (Absolute) 2.34 1.82 - 7.42 K/uL    Lymphs (Absolute) 0.48 (L) 1.00 - 4.80 K/uL    Monos (Absolute) 0.36 0.00 - 0.85 K/uL    Eos (Absolute) 0.04 0.00 - 0.51 K/uL    Baso (Absolute) 0.01 0.00 - 0.12 K/uL    Immature Granulocytes (abs) 0.02 0.00 - 0.11 K/uL    NRBC (Absolute) 0.00 K/uL   COMP METABOLIC PANEL   Result Value Ref Range    Sodium 139 135 - 145 mmol/L    Potassium 3.5 (L) 3.6 - 5.5 mmol/L    Chloride 108 96 - 112 mmol/L    Co2 17 (L) 20 - 33 mmol/L    Anion Gap 14.0 7.0 - 16.0    Glucose 134 (H) 65 - 99 mg/dL    Bun 12 8 - 22 mg/dL    Creatinine 0.78 0.50 - 1.40 mg/dL    Calcium 9.2 8.5 - 10.5 mg/dL    Correct Calcium 8.6 8.5 - 10.5 mg/dL    AST(SGOT) 16 12 - 45 U/L    ALT(SGPT) 29 2 - 50 U/L    Alkaline Phosphatase 73 30 - 99 U/L    Total Bilirubin 0.3 0.1 - 1.5 mg/dL    Albumin 4.7 3.2 - 4.9 g/dL    Total Protein 6.7 6.0 - 8.2 g/dL     Globulin 2.0 1.9 - 3.5 g/dL    A-G Ratio 2.4 g/dL   ESTIMATED GFR   Result Value Ref Range    GFR (CKD-EPI) 102 >60 mL/min/1.73 m 2   EKG   Result Value Ref Range    Report       [facility] Emergency Dept.    Test Date:  2023  Pt Name:    HARLEY DE LA CRUZ              Department: ER  MRN:        [id number]                      Room:       [facility]  Gender:     Female                       Technician: [id number]  :        [id number]                   Requested By:CHARISSE LAL  Order #:    [id number]                    Reading MD:    Measurements  Intervals                                Axis  Rate:       103                          P:          33  OK:         133                          QRS:        47  QRSD:       89                           T:          -2  QT:         389  QTc:        509    Interpretive Statements  Sinus tachycardia  Low voltage, precordial leads  Borderline T abnormalities, anterior leads  Prolonged QT interval  Compared to ECG 11/15/2023 16:22:06  Low QRS voltage now present  Sinus rhythm no longer present  T-wave abnormality still present       *Note: Due to a large number of results and/or encounters for the requested time period, some results have not been displayed. A complete set of results can be found in Results Review.   All labs reviewed by me.    EKG:   I have independently interpreted this EKG     Radiology:   The attending Emergency Physician has independently interpreted the diagnostic imaging associated with this visit and is awaiting the final reading from the radiologist, which will be displayed below.  Preliminary interpretation is a follows: chronic changes consistent with influenza, no pneumonia   Radiologist interpretation:  DX-CHEST-PORTABLE (1 VIEW)   Final Result      Central bronchial wall thickening compatible with viral respiratory infection and/or reactive airways disease most commonly.         COURSE & MEDICAL DECISION MAKING  ED Observation Status?  No; Patient does not meet criteria for ED Observation.     INITIAL ASSESSMENT AND PLAN  Care Narrative:       6:33 PM - Patient was seen and evaluated at bedside. Patient presents to the ED for flu like symptoms onset ten days ago. On exam patient is in moderate respiratory distress. Diminished air exchange with tight expiratory wheezing. After my exam, I discussed with the patient the plan of care, which includes treating the patient with medication for their symptoms, as well as obtaining lab work and imaging for further evaluation. Patient understands and verbalizes agreement to plan of care. Patient will be treated with Duoneb, decadron 12 mg and NS infusion. Ordered DX chest, CBC w/ diff, CMP, CoV-2 Flu A/B and RSV PCR and EKG to evaluate.   Differential diagnoses include but not limited to: COVID vs influenza vs pneumonia     Patient tested positive for influenza A.  Her overall white count is low at 3.3 likely secondary to her Humira.  Remaining labs were unremarkable, electrolytes are normal except for a potassium of 3.5.  She responded well to the DuoNeb and had marked improvement in her air exchange so I gave her a second DuoNeb treatment she also received Decadron, chest x-ray shows bronchial wall thickening compatible with respiratory disease which is likely due to the influenza A.  I had a lengthy discussion with the patient regarding the medications at home and she states that she has albuterol inhalation solution at home but cannot afford DuoNeb.  We will try to give her a Combivent inhaler along with some prednisone to take with the hopes that she will be able to afford this and help.  I also gave her Toradol for her body aches.  She has a follow-up with her primary care doctor within the week for recheck and return if any problems or worsening.  She is discharged in stable and improved condition.    9:54 PM - Patient was reevaluated at bedside. Discussed lab and radiology results with the patient  and informed them she has influenza A and she has chronic changes consistent with influenza, no pneumonia. Discussed with patient to rest as much as possible and increase fluids. Plan to treat her symptoms with outpatient Toradol, . I then informed the patient of my plan for discharge, which includes strict return precautions for any new or worsening symptoms. Patient understands and verbalizes agreement to plan of care. Patient is comfortable going home at this time.     ADDITIONAL PROBLEM LIST AND DISPOSITION  Immune compromise, asthma, sleep apnea, migraines, POTS syndrome               DISPOSITION AND DISCUSSIONS  I have discussed management of the patient with the following physicians and ARIES's: None    Discussion of management with other QHP or appropriate source(s): None     Escalation of care considered, and ultimately not performed: acute inpatient care management, however at this time, the patient is most appropriate for outpatient management.    Barriers to care at this time, including but not limited to:  None .     Decision tools and prescription drugs considered including, but not limited to: Medication modification Prednisone, combivent and Toradol .    The patient will return for new or worsening symptoms and is stable at the time of discharge.    DISPOSITION:  Patient will be discharged home in stable condition.    FOLLOW UP:  Torres Brody M.D.  [address]  [place] [id number] [phone]    Schedule an appointment as soon as possible for a visit in 1 week  For recheck    OUTPATIENT MEDICATIONS:  Discharge Medication List as of 12/23/2023 10:24 PM        START taking these medications    Details   predniSONE (DELTASONE) 20 MG Tab Take 2 Tablets by mouth every day for 6 days. Take with food, Disp-12 Tablet, R-0, Normal      albuterol-ipratropium (COMBIVENT)  MCG/ACT Aerosol Inhale 2 Puffs every 6 hours as needed for Shortness of Breath. Use with spacer, Disp-1 Inhaler,  R-1, Print Rx Paper      ketorolac (TORADOL) 10 MG Tab Take 1 Tablet by mouth 3 times a day as needed for Moderate Pain., Disp-15 Tablet, R-0, Normal           FINAL IMPRESSION   1. Influenza A    2. Viral syndrome    3. Acute dyspnea    4. History of asthma    5. Hypokalemia      Nilsa RODRIGUEZ (Scribe), am scribing for, and in the presence of, Sonal Bryant D.O..    Electronically signed by: Nilsa Vigil (Scribe), 12/23/2023    ISonal D.O. personally performed the services described in this documentation, as scribed by Nilsa Vigil in my presence, and it is both accurate and complete.    The note accurately reflects work and decisions made by me.  Sonal Bryant D.O.  12/24/2023  2:01 AM

## 2023-12-24 NOTE — DISCHARGE INSTRUCTIONS
Rest as much as possible, increase fluids, especially water and water based products  Hot tea with lemon and honey, warm salt water gargles  Use your inhaler every 6 hours with a spacer  Use the Combivent inhaler every 6 hours as needed  Take the steroids with food for the next 5 days.  Follow-up with your primary care provider within the week for recheck and return if any problems or worsening.  Eat foods high in potassium, look it up on the Internet but examples are bananas, dried apricots, kiwi fruit.

## 2023-12-24 NOTE — ED NOTES
Pt ambulatory to restroom with stand by assist, pt quite out of breath upon return to room with moderate work of breathing. 02 saturation remained > 90%.

## 2023-12-24 NOTE — ED TRIAGE NOTES
Vitals:    12/23/23 1824   BP: 127/86   Pulse: 93   Resp: 20   Temp: 36.8 °C (98.2 °F)   SpO2: 96%     Chief Complaint   Patient presents with    Flu Like Symptoms    Shortness of Breath     Pt reports feeling the above complaints for about 10 days but today it got a lot worse.     Pt is BIB [facility] and was given an albuterol tx en route. VSS on room air. She is alert and oriented x 4.

## 2023-12-26 ENCOUNTER — HOSPITAL ENCOUNTER (EMERGENCY)
Facility: MEDICAL CENTER | Age: 33
End: 2023-12-26
Attending: EMERGENCY MEDICINE
Payer: MEDICARE

## 2023-12-26 VITALS
HEIGHT: 64 IN | RESPIRATION RATE: 17 BRPM | WEIGHT: 257.94 LBS | HEART RATE: 81 BPM | TEMPERATURE: 98.1 F | OXYGEN SATURATION: 96 % | BODY MASS INDEX: 44.04 KG/M2 | DIASTOLIC BLOOD PRESSURE: 81 MMHG | SYSTOLIC BLOOD PRESSURE: 147 MMHG

## 2023-12-26 DIAGNOSIS — R06.00 DYSPNEA, UNSPECIFIED TYPE: ICD-10-CM

## 2023-12-26 DIAGNOSIS — J45.41 MODERATE PERSISTENT ASTHMA WITH EXACERBATION: ICD-10-CM

## 2023-12-26 DIAGNOSIS — J11.1 INFLUENZA: ICD-10-CM

## 2023-12-26 LAB — EKG IMPRESSION: NORMAL

## 2023-12-26 PROCEDURE — 93005 ELECTROCARDIOGRAM TRACING: CPT

## 2023-12-26 PROCEDURE — 99285 EMERGENCY DEPT VISIT HI MDM: CPT

## 2023-12-26 PROCEDURE — 700101 HCHG RX REV CODE 250: Mod: UD | Performed by: EMERGENCY MEDICINE

## 2023-12-26 PROCEDURE — 94640 AIRWAY INHALATION TREATMENT: CPT

## 2023-12-26 PROCEDURE — 93005 ELECTROCARDIOGRAM TRACING: CPT | Performed by: EMERGENCY MEDICINE

## 2023-12-26 RX ADMIN — ALBUTEROL SULFATE 2.5 MG: 2.5 SOLUTION RESPIRATORY (INHALATION) at 11:12

## 2023-12-26 ASSESSMENT — FIBROSIS 4 INDEX: FIB4 SCORE: 0.8

## 2023-12-26 NOTE — ED NOTES
Bedside report received from off going RN: Tami NOBLES RN, assumed care of patient.  POC discussed with patient. Call light within reach, all needs addressed at this time.       Fall risk interventions in place: Not Applicable (all applicable per [facility] Fall risk assessment)   Continuous monitoring: Cardiac Leads, Pulse Ox, or Blood Pressure  IVF/IV medications: Not Applicable   Oxygen: Room Air  Bedside sitter: Not Applicable   Isolation: Isolation precautions for Flu A (Isolation order)

## 2023-12-26 NOTE — ED NOTES
"Patient reporting that her breathing feels like \"it is getting tighter and harder to breathe\". Patient 02 saturation 95%. ERP notified.   "

## 2023-12-26 NOTE — ED TRIAGE NOTES
Kristin Balderrama  33 y.o.  female  Chief Complaint   Patient presents with    Shortness of Breath     Pt seen in ED & dx with Flu A on Dec 23rd. Pt having increasing shortness of breath, feeling of chest tightness x last few days. Took her home nebs (albuterol & combivent) without relief, took curse of steriods. No audible wheezes in triage, is short of breath on exertion & speaking short sentences.      Patient educated on triage process, to alert staff if any changes in condition.

## 2023-12-26 NOTE — ED PROVIDER NOTES
"ED Provider Note    CHIEF COMPLAINT  Chief Complaint   Patient presents with    Shortness of Breath     Pt seen in ED & dx with Flu A on Dec 23rd. Pt having increasing shortness of breath, feeling of chest tightness x last few days. Took her home nebs (albuterol & combivent) without relief, took curse of steriods. No audible wheezes in triage, is short of breath on exertion & speaking short sentences.        EXTERNAL RECORDS REVIEWED  Inpatient Notes ER note from 12-23 -23 reviewed    HPI/ROS  LIMITATION TO HISTORY   Select: : None  OUTSIDE HISTORIAN(S):  None    rKistin Balderrama is a 33 y.o. female who presents to the ER with complaint of persistent dyspnea.  Recently seen in the emergency department 3 days ago and diagnosed with influenza A.  Patient does have a chronic history of asthma.  She has been using her home nebulizer without relief and continues to feel \"tight \".  Denies any other significant changes.    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[facility]), 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    SURGICAL HISTORY   has a past surgical history that includes neuro dest facet l/s w/ig sngl (04/21/2015); recovery (01/27/2016); [place] by laparoscopy (08/29/2010); lumbar fusion anterior (08/21/2012); other cardiac surgery (01/2016); tonsillectomy; bowel stimulator insertion (07/13/2016); gastroscopy with balloon dilatation (N/A, 01/04/2017); muscle biopsy (Right, 01/26/2017); other abdominal surgery; laminotomy; bowel stimulator insertion (03/10/2021); lap,diagnostic abdomen (02/14/2022); ovarian cystectomy (Right, 02/14/2022); percut fix prox/neck femur fx (Left, 01/28/2023); inguinal hernia laparoscopic (Right, 07/21/2023); and hernia repair (Right, 07/21/2023).    FAMILY HISTORY  Family History   Problem Relation Age of Onset    Hypertension Mother     Sleep Apnea Mother     Heart Disease Mother     Other Mother         hypothryod    Hypertension Maternal Uncle     Heart Disease Maternal Grandmother     Hypertension Maternal Grandmother     No Known Problems Sister     Other Sister         Narcolepsy;fibromyalsia;bone;nerve    Genitourinary () Problems Sister         endometriosis       SOCIAL HISTORY  Social History     Tobacco Use    Smoking status: Never    Smokeless tobacco: Never   Vaping Use    Vaping Use: Never used   Substance and Sexual Activity    Alcohol use: No     Alcohol/week: 0.0 oz    Drug use: Never     Frequency: 7.0 times per week    Sexual activity: Not Currently     Birth control/protection: Implant       CURRENT MEDICATIONS  Home Medications       Reviewed by Elva Zepeda R.N. (Registered Nurse) on 12/26/23 at 0955  Med List Status: Not Addressed     Medication Last Dose Status   Adalimumab 80 MG/0.8ML Pen-injector Kit  Active   albuterol (PROVENTIL) 2.5mg/3ml Nebu Soln solution for nebulization  Active   albuterol 108 (90 Base) MCG/ACT Aero Soln inhalation aerosol  Active  "  albuterol-ipratropium (COMBIVENT)  MCG/ACT Aerosol  Active   azithromycin (ZITHROMAX) 250 MG Tab  Active   benzonatate (TESSALON) 200 MG capsule  Active   CLINDAMYCIN PHOSPHATE,TOPICAL, 1 % Lotion  Active   diphenhydrAMINE (BENADRYL) 50 MG tablet  Active   docusate sodium (STOOL SOFTENER) 250 MG capsule  Active   etonogestrel (NEXPLANON) 68 MG Implant implant  Active   fluconazole (DIFLUCAN) 150 MG tablet  Active   fluticasone (FLONASE) 50 MCG/ACT nasal spray  Active   Galcanezumab-gnlm (EMGALITY) 120 MG/ML Solution Auto-injector  Active   ivabradine (CORLANOR) 7.5 MG Tab tablet  Active   ketorolac (ACULAR) 0.5 % Solution  Active   ketorolac (TORADOL) 10 MG Tab  Active   Melatonin 10 MG Tab  Active   metoprolol SR (TOPROL XL) 25 MG TABLET SR 24 HR  Active   mupirocin (BACTROBAN) 2 % Ointment  Active   naproxen (NAPROSYN) 500 MG Tab  Active   predniSONE (DELTASONE) 20 MG Tab  Active   pregabalin (LYRICA) 75 MG Cap  Active   sodium chloride (SALT) 1 GM Tab  Active   tizanidine (ZANAFLEX) 4 MG Tab  Active   topiramate (TOPAMAX) 25 MG Tab  Active   topiramate (TOPAMAX) 25 MG Tab  Active   ziprasidone (GEODON) 40 MG Cap  Active                    ALLERGIES  Allergies   Allergen Reactions    Cefdinir Shortness of Breath and Itching     Tolerated 1/18/17  Tolerates ceftriaxone  Tolerated augmentin 8/2019     Depakote [Divalproex Sodium] Unspecified     Muscle spasms/muscle pain and weakness      Doxycycline Anaphylaxis and Vomiting     Other reaction(s): pustules/blisters  Other reaction(s): stomach pain    Montelukast [Singulair] Unspecified     Cardiac effusion    Vancomycin Itching     Pt becomes flushed in face and chest.   RXN=7/10/16    Wound Dressing Adhesive Rash     By pt report-\"removes skin totally off\"    Amitriptyline Unspecified     Headaches      Aripiprazole Unspecified    Aripiprazole [Abilify] Unspecified     Headaches/muscle twitching      Clindamycin Nausea         Other reaction(s): nausea " "stomach pain    Flomax [Tamsulosin Hydrochloride] Swelling    Levaquin Unspecified     Severe muscle cramps in legs  RXN=unknown  Other reaction(s): leg muscle cramps    Metformin Unspecified     Increased lactic acid     Other reaction(s): itching and rash/nausea vomiting    Tamsulosin Swelling     Swelling of legs    Tape Rash     Tears skin off  coban with Tegaderm tape ok intermittently  RXN=ongoing    Amoxicillin Rash    Depakote [Divalproex Sodium]     Erythromycin Rash     .  Other reaction(s): nausea stomach pain    Hydromorphone      Other reaction(s): vomiting    Montelukast     Ampicillin Rash     Pt reports that she received a rash     Ciprofloxacin Rash          Keflex Rash     Pt states she gets a rash with this medication  Tolerates ceftriaxone  Can take with Benadryl    Levofloxacin Unspecified     Leg muscle cramps    Metronidazole Rash     \"Vision problems\"  Other reaction(s): vision problems    Penicillins Hives     Can take with Benadryl    Sulfa Drugs Itching, Myalgia and Hives     Muscle pain and weakness  Other reaction(s): unknown    Valproic Acid Rash     .       PHYSICAL EXAM  VITAL SIGNS: BP (!) (P) 147/81   Pulse (P) 81   Temp 36.8 °C (98.2 °F) (Temporal)   Resp (P) 17   Ht 1.626 m (5' 4\")   Wt 117 kg (257 lb 15 oz)   SpO2 (P) 96%   BMI 44.27 kg/m²      Pulse ox interpretation: I interpret this pulse ox as normal.  Constitutional: Alert in no apparent distress.  HENT: No signs of trauma, Bilateral external ears normal, Nose normal.   Eyes: Pupils are equal and reactive  Neck: Normal range of motion, No tenderness, Supple,   Cardiovascular: Regular rate and rhythm, no murmurs.   Thorax & Lungs: End expiratory wheeze  Skin: Warm, Dry, No erythema, No rash.   Extremities: Intact distal pulses, No edema, No tenderness, No cyanosis,  Negative Dhiraj's sign.   Musculoskeletal: Good range of motion in all major joints. No tenderness to palpation or major deformities noted.   Neurologic: " Alert , Normal motor function, Normal sensory function, No focal deficits noted.   Psychiatric: Anxious with chronic psychiatric affect        DIAGNOSTIC STUDIES / PROCEDURES  LABS  Results for orders placed or performed during the hospital encounter of 23   EKG   Result Value Ref Range    Report       [facility] Emergency Dept.    Test Date:  2023  Pt Name:    HARLEY DE LA CRUZ              Department: ER  MRN:        [id number]                      Room:  Gender:     Female                       Technician: [id number]  :        1990                   Requested By:ER TRIAGE PROTOCOL  Order #:    [id number]                    Reading MD: Brian Slater    Measurements  Intervals                                Axis  Rate:       98                           P:          39  KS:         135                          QRS:        3  QRSD:       81                           T:          -17  QT:         426  QTc:        545    Interpretive Statements  Sinus rhythm  Borderline T abnormalities, diffuse leads  Prolonged QT interval  Baseline wander in lead(s) V1,V2  Compared to ECG 2023 18:32:22  Sinus tachycardia no longer present  T-wave abnormality still present  Electronically Signed On 2023 12:47:19 PST by Brian Slater       *Note: Due to a large number of results and/or encounters for the requested time period, some results have not been displayed. A complete set of results can be found in Results Review.     EKG interpreted myself as above      COURSE & MEDICAL DECISION MAKING    ED Observation Status? No; Patient does not meet criteria for ED Observation.     INITIAL ASSESSMENT, COURSE AND PLAN  Care Narrative: 33-year-old presenting back to the emergency department for persistent dyspnea.  Recently diagnosed with influenza.  She does have a past history of asthma.  She continues to use her home nebulizer.  On initial evaluation she does not appear to be in respiratory distress  and is satting well.  She does however appear anxious.    Will repeat nebulized treatment here.  At this point we will defer from further emergent workup although differential has been discussed with the patient at bedside.    DISPOSITION AND DISCUSSIONS  I have discussed management of the patient with the following physicians and ARIES's: None    Discussion of management with other QHP or appropriate source(s): None     Escalation of care considered, and ultimately not performed:blood analysis, diagnostic imaging, and acute inpatient care management, however at this time, the patient is most appropriate for outpatient management    Barriers to care at this time, including but not limited to:  None .     Decision tools and prescription drugs considered including, but not limited to: Antibiotics not indicated .    33-year-old presenting to the emerged part with above presentation.  Patient feeling slightly better after initial nebulizer treatment here.  Again she continues to have good oxygen saturations in the mid 90s.  No respiratory distress.  At this point I do not believe that further emergent workup will be required.  She is to follow-up with her outpatient PCP.  She states that she does have enough medications at home for her routine medications as previously prescribed by her PCP.  I have reiterated her ability return here to the ER with any change or worsening.    FINAL DIAGNOSIS  1. Dyspnea, unspecified type    2. Influenza    3. Moderate persistent asthma with exacerbation           Electronically signed by: Brian Slater M.D., 12/26/2023 10:35 AM

## 2023-12-26 NOTE — ED NOTES
Pt provided discharge instructions. Pt verbalized understanding of all instructions. Pt ambulatory to lobby w/ cane.

## 2023-12-29 ENCOUNTER — OFFICE VISIT (OUTPATIENT)
Dept: URGENT CARE | Facility: CLINIC | Age: 33
End: 2023-12-29
Payer: MEDICARE

## 2023-12-29 VITALS
HEART RATE: 91 BPM | WEIGHT: 258.8 LBS | HEIGHT: 64 IN | BODY MASS INDEX: 44.18 KG/M2 | SYSTOLIC BLOOD PRESSURE: 120 MMHG | OXYGEN SATURATION: 95 % | RESPIRATION RATE: 22 BRPM | DIASTOLIC BLOOD PRESSURE: 76 MMHG | TEMPERATURE: 98.1 F

## 2023-12-29 DIAGNOSIS — J11.1 INFLUENZA: ICD-10-CM

## 2023-12-29 DIAGNOSIS — J45.41 MODERATE PERSISTENT ASTHMA WITH EXACERBATION: ICD-10-CM

## 2023-12-29 PROCEDURE — 3078F DIAST BP <80 MM HG: CPT | Performed by: PHYSICIAN ASSISTANT

## 2023-12-29 PROCEDURE — 3074F SYST BP LT 130 MM HG: CPT | Performed by: PHYSICIAN ASSISTANT

## 2023-12-29 PROCEDURE — 99214 OFFICE O/P EST MOD 30 MIN: CPT | Performed by: PHYSICIAN ASSISTANT

## 2023-12-29 RX ORDER — PREDNISONE 20 MG/1
TABLET ORAL
Qty: 10 TABLET | Refills: 0 | Status: SHIPPED | OUTPATIENT
Start: 2023-12-29 | End: 2024-01-16

## 2023-12-29 RX ORDER — LAMOTRIGINE 25 MG/1
50 TABLET ORAL 2 TIMES DAILY
COMMUNITY
Start: 2023-09-27

## 2023-12-29 ASSESSMENT — ENCOUNTER SYMPTOMS
HEMOPTYSIS: 0
WHEEZING: 1
CHILLS: 0
VOMITING: 0
COUGH: 1
SPUTUM PRODUCTION: 0
SHORTNESS OF BREATH: 1
FEVER: 0
ABDOMINAL PAIN: 0
DIARRHEA: 1

## 2023-12-29 ASSESSMENT — FIBROSIS 4 INDEX: FIB4 SCORE: 0.8

## 2023-12-29 NOTE — PROGRESS NOTES
Subjective:   Kristin Balderrama is a 33 y.o. female who presents today with   Chief Complaint   Patient presents with    Asthma     PT has SOB, wheezing x 1 week        Cough  This is a new problem. The current episode started in the past 7 days. The problem has been unchanged. The problem occurs every few minutes. The cough is Non-productive. Associated symptoms include shortness of breath and wheezing. Pertinent negatives include no chest pain, chills, fever or hemoptysis. Treatments tried: inhaler, nebulizer. The treatment provided mild relief. Her past medical history is significant for asthma.       Patient was last seen in the ER on December 26 for shortness of breath.  She was seen on December 23 and diagnosed with flu at that time.  Patient states she was feeling better on the steroids but is out of those.  Patient states she is also been having diarrhea and is lost 4 pounds since illness started.  She states she has been using nebulizers at home with some relief but is still feeling as if she cannot fully catch her breath and feels wheezing.  X-ray at the ER was reviewed and showed central bronchial wall thickening compatible with respiratory viral illness and/or reactive airway disease.    PMH:  has a past medical history of Abdominal pain, Anginal syndrome, Apnea, sleep, Arrhythmia, Arthritis, ASTHMA, Back pain, Borderline personality disorder (HCC), Breath shortness, Bronchitis (02/08/2022), Cardiac arrhythmia, Chickenpox, Chronic UTI (09/18/2010), Cough, Daytime sleepiness, Dental disorder (03/08/2021), Depression, Diabetes (HCC), Diarrhea, Disorder of thyroid, Fall, Fatigue, Frequent headaches, Gasping for breath, Gynecological disorder, Headache(784.0), Heart burn, Heart murmur, History of falling, Indigestion, Migraine, Mitochondrial disease (HCC), Multiple personality disorder (HCC), Nausea, Obesity, Other fatigue (06/29/2020), Pain (08/15/2012), Painful joint, PCOS (polycystic ovarian  syndrome), Pneumonia (2012 12/2020), POTS (postural orthostatic tachycardia syndrome), Psychosis (HCC), Ringing in ears, Scoliosis, Shortness of breath, Sinus tachycardia (10/31/2013), Sleep apnea, Snoring, Supraventricular tachycardia (4/10/2019), Tonsillitis, Transverse myelitis (HCC), Tuberculosis, Urinary bladder disorder, Urinary incontinence, Weakness, and Wears glasses.  MEDS:   Current Outpatient Medications:     lamoTRIgine (LAMICTAL) 25 MG Tab, Take 2 tablet by mouth twice a day, Disp: , Rfl:     OXYGEN-HELIUM INH, 2 to 4 LPM as needed via nasal cannula, Disp: , Rfl:     predniSONE (DELTASONE) 20 MG Tab, Take 1 tab twice daily for 5 days., Disp: 10 Tablet, Rfl: 0    albuterol-ipratropium (COMBIVENT)  MCG/ACT Aerosol, Inhale 2 Puffs every 6 hours as needed for Shortness of Breath. Use with spacer, Disp: 1 Inhaler, Rfl: 1    ivabradine (CORLANOR) 7.5 MG Tab tablet, Take 1 Tablet by mouth 2 times a day with meals., Disp: 60 Tablet, Rfl: 2    albuterol (PROVENTIL) 2.5mg/3ml Nebu Soln solution for nebulization, Take 3 mL by nebulization every four hours as needed for Shortness of Breath., Disp: 100 mL, Rfl: 3    pregabalin (LYRICA) 75 MG Cap, 1 CAPSULE TWICE A DAY QTY 60 FOR 30 DAY, Disp: , Rfl:     Galcanezumab-gnlm (EMGALITY) 120 MG/ML Solution Auto-injector, Inject  under the skin., Disp: , Rfl:     fluticasone (FLONASE) 50 MCG/ACT nasal spray, Administer 2 Sprays into affected nostril(S) at bedtime., Disp: 16 g, Rfl: 1    fluconazole (DIFLUCAN) 150 MG tablet, Take 150 mg by mouth every Wednesday., Disp: , Rfl:     topiramate (TOPAMAX) 25 MG Tab, Take 25-50 mg by mouth 2 times a day. 1 tablet (25 mg) in the morning 2 tablets (50 mg) at bedtime, Disp: , Rfl:     metoprolol SR (TOPROL XL) 25 MG TABLET SR 24 HR, Take 1 Tablet by mouth every day., Disp: 90 Tablet, Rfl: 3    docusate sodium (STOOL SOFTENER) 250 MG capsule, Take 250 mg by mouth 2 times a day., Disp: , Rfl:     Adalimumab 80 MG/0.8ML  Pen-injector Kit, Inject 80 mg under the skin every 14 days., Disp: , Rfl:     albuterol 108 (90 Base) MCG/ACT Aero Soln inhalation aerosol, Inhale 2 Puffs every 6 hours as needed for Shortness of Breath., Disp: 8.5 g, Rfl: 0    tizanidine (ZANAFLEX) 4 MG Tab, Take 4 mg by mouth 3 times a day., Disp: , Rfl:     sodium chloride (SALT) 1 GM Tab, Take 1 Tablet by mouth 3 times a day with meals., Disp: 90 Tablet, Rfl: 2    ziprasidone (GEODON) 40 MG Cap, Take 1 capsule by mouth at bedtime., Disp: 30 Capsule, Rfl: 2    diphenhydrAMINE (BENADRYL) 50 MG tablet, Take 50 mg by mouth at bedtime., Disp: , Rfl:     Melatonin 10 MG Tab, Take 10 mg by mouth at bedtime., Disp: , Rfl:     ketorolac (TORADOL) 10 MG Tab, Take 1 Tablet by mouth 3 times a day as needed for Moderate Pain., Disp: 15 Tablet, Rfl: 0    azithromycin (ZITHROMAX) 250 MG Tab, Take two tabs on day one followed by one tab on days 2-5. (Patient not taking: Reported on 12/12/2023), Disp: 6 Tablet, Rfl: 0    CLINDAMYCIN PHOSPHATE,TOPICAL, 1 % Lotion, Apply to skin as directed for folliculitis x 10 days., Disp: 60 mL, Rfl: 0    mupirocin (BACTROBAN) 2 % Ointment, Apply 1 Application topically 2 times a day. (Patient not taking: Reported on 9/28/2023), Disp: 22 g, Rfl: 0    ketorolac (ACULAR) 0.5 % Solution, Administer 1 Drop into both eyes 4 times a day. (Patient not taking: Reported on 9/28/2023), Disp: 3 mL, Rfl: 0    benzonatate (TESSALON) 200 MG capsule, Take 1 Capsule by mouth 3 times a day as needed for Cough. (Patient not taking: Reported on 9/14/2023), Disp: 30 Capsule, Rfl: 0    topiramate (TOPAMAX) 25 MG Tab, Take 25 Tablets by mouth 2 times a day. (Patient not taking: Reported on 9/14/2023), Disp: , Rfl:     naproxen (NAPROSYN) 500 MG Tab, Take 1 Tablet by mouth 2 times a day with meals. (Patient not taking: Reported on 9/14/2023), Disp: 20 Tablet, Rfl: 0    etonogestrel (NEXPLANON) 68 MG Implant implant, Inject 1 Each under the skin see administration  "instructions. Pt reports she is not sure when she had this medications injected last, pt reports she still has it in her arm Every 3 years to change, Disp: , Rfl:   ALLERGIES:   Allergies   Allergen Reactions    Cefdinir Shortness of Breath and Itching     Tolerated 1/18/17  Tolerates ceftriaxone  Tolerated augmentin 8/2019     Depakote [Divalproex Sodium] Unspecified     Muscle spasms/muscle pain and weakness      Doxycycline Anaphylaxis and Vomiting     Other reaction(s): pustules/blisters  Other reaction(s): stomach pain    Montelukast [Singulair] Unspecified     Cardiac effusion    Vancomycin Itching     Pt becomes flushed in face and chest.   RXN=7/10/16    Wound Dressing Adhesive Rash     By pt report-\"removes skin totally off\"    Amitriptyline Unspecified     Headaches      Aripiprazole Unspecified    Aripiprazole [Abilify] Unspecified     Headaches/muscle twitching      Clindamycin Nausea         Other reaction(s): nausea stomach pain    Flomax [Tamsulosin Hydrochloride] Swelling    Levaquin Unspecified     Severe muscle cramps in legs  RXN=unknown  Other reaction(s): leg muscle cramps    Metformin Unspecified     Increased lactic acid     Other reaction(s): itching and rash/nausea vomiting    Tamsulosin Swelling     Swelling of legs    Tape Rash     Tears skin off  coban with Tegaderm tape ok intermittently  RXN=ongoing    Amoxicillin Rash    Depakote [Divalproex Sodium]     Erythromycin Rash     .  Other reaction(s): nausea stomach pain    Hydromorphone      Other reaction(s): vomiting    Montelukast     Ampicillin Rash     Pt reports that she received a rash     Ciprofloxacin Rash          Keflex Rash     Pt states she gets a rash with this medication  Tolerates ceftriaxone  Can take with Benadryl    Levofloxacin Unspecified     Leg muscle cramps    Metronidazole Rash     \"Vision problems\"  Other reaction(s): vision problems    Penicillins Hives     Can take with Benadryl    Sulfa Drugs Itching, Myalgia " and Hives     Muscle pain and weakness  Other reaction(s): unknown    Valproic Acid Rash     .     SURGHX:   Past Surgical History:   Procedure Laterality Date    INGUINAL HERNIA LAPAROSCOPIC Right 07/21/2023    Procedure: LAPAROSCOPIC RIGHT INGUINAL HERNIA REPAIR WITH MESH;  Surgeon: Joe Noyola M.D.;  Location: [facility];  Service: General    HERNIA REPAIR Right 07/21/2023    PB PERCUT FIX PBOX/NECK FEMUR FX Left 01/28/2023    Procedure: FIXATION, HIP, USING CANNULATED SCREW;  Surgeon: Noman Abdul M.D.;  Location: [facility];  Service: Orthopedics    LA LAP,DIAGNOSTIC ABDOMEN  02/14/2022    Procedure: LAPAROSCOPY;  Surgeon: Seamus Pisano M.D.;  Location: SURGERY SAME DAY [facility];  Service: Gynecology    OVARIAN CYSTECTOMY Right 02/14/2022    Procedure: EXCISION, CYST, OVARY;  Surgeon: Seamus Pisano M.D.;  Location: SURGERY SAME DAY [facility];  Service: Gynecology    BOWEL STIMULATOR INSERTION  03/10/2021    Procedure: INSERTION, ELECTRODE LEADS AND PULSE GENERATOR, NEUROSTIMULATOR, SACRAL - REMOVAL OF INTERSTIM WITH REPLACEMENT OF SACRAL NEUROMODULATION DEVICE;  Surgeon: Joe Noyola M.D.;  Location: [facility];  Service: General    MUSCLE BIOPSY Right 01/26/2017    Procedure: MUSCLE BIOPSY - THIGH;  Surgeon: Isidro Vigil M.D.;  Location: [facility];  Service:     GASTROSCOPY WITH BALLOON DILATATION N/A 01/04/2017    Procedure: GASTROSCOPY WITH DILATATION;  Surgeon: Torres Vargas M.D.;  Location: [facility];  Service:     BOWEL STIMULATOR INSERTION  07/13/2016    Procedure: BOWEL STIMULATOR INSERTION FOR PERMANENT INTERSTIM SACRAL IMPLANT;  Surgeon: Joe Noyola M.D.;  Location: [facility];  Service:     RECOVERY  01/27/2016    Procedure: CATH LAB EP STUDY WITH SINUS NODE MODIFICATION ABHINAV;  Surgeon: Recoveryonly Surgery;  [place] [facility]  [place]:     OTHER CARDIAC SURGERY  01/2016    cardiac ablation  "   NEURO DEST FACET L/S W/IG SNGL  04/21/2015    Performed by Reza Tabor at SURGERY [facility]    LUMBAR FUSION ANTERIOR  08/21/2012    Performed by SUSIE SAWANT at SURGERY [facility] ORS    ALYSSA BY LAPAROSCOPY  08/29/2010    Performed by SHAYY JOHNS at SURGERY [facility]S    LAMINOTOMY      OTHER ABDOMINAL SURGERY      TONSILLECTOMY      tonsillectomy     SOCHX:  reports that she has never smoked. She has never used smokeless tobacco. She reports that she does not drink alcohol and does not use drugs.  FH: Reviewed with patient, not pertinent to this visit.       Review of Systems   Constitutional:  Negative for chills and fever.   Respiratory:  Positive for cough, shortness of breath and wheezing. Negative for hemoptysis and sputum production.    Cardiovascular:  Negative for chest pain.   Gastrointestinal:  Positive for diarrhea. Negative for abdominal pain and vomiting.        Objective:   /76 (BP Location: Left arm, Patient Position: Sitting, BP Cuff Size: Large adult)   Pulse 91   Temp 36.7 °C (98.1 °F) (Temporal)   Resp (!) 22   Ht 1.626 m (5' 4\")   Wt 117 kg (258 lb 12.8 oz)   SpO2 95%   BMI 44.42 kg/m²   Physical Exam  Vitals and nursing note reviewed.   Constitutional:       General: She is not in acute distress.     Appearance: Normal appearance. She is well-developed. She is not ill-appearing or toxic-appearing.   HENT:      Head: Normocephalic and atraumatic.      Right Ear: Hearing normal.      Left Ear: Hearing normal.   Cardiovascular:      Rate and Rhythm: Normal rate and regular rhythm.      Heart sounds: Normal heart sounds.   Pulmonary:      Effort: Pulmonary effort is normal. No respiratory distress.      Breath sounds: No stridor. Wheezing present. No rhonchi or rales.   Musculoskeletal:      Comments: Normal movement in all 4 extremities   Skin:     General: Skin is warm and dry.   Neurological:      Mental Status: She is alert.      Coordination: " Coordination normal.   Psychiatric:         Mood and Affect: Mood normal.       Ambulating with cane at baseline.      Assessment/Plan:   Assessment    1. Moderate persistent asthma with exacerbation  - predniSONE (DELTASONE) 20 MG Tab; Take 1 tab twice daily for 5 days.  Dispense: 10 Tablet; Refill: 0    2. Influenza    Other orders  - lamoTRIgine (LAMICTAL) 25 MG Tab; Take 2 tablet by mouth twice a day  - OXYGEN-HELIUM INH; 2 to 4 LPM as needed via nasal cannula      Symptoms and presentation are consistent with asthma exacerbation.  No concerning findings that would be suggestive of needing any repeat x-ray at this time.  Did discuss with patient that if she is continuing to have diarrhea and symptoms persist or do not seem to improve with steroids and nebulizer treatments then I would recommend she go back into the emergency room and she is understanding will have low threshold for this.  Would recommend treating with additional round of steroids to help reduce inflammation in her lungs.  No indication for antibiotics at this time.  Patient will have close monitoring for the next 24 to 48 hours in hopes of improvement in that timeframe with another round of steroids.  Also suggest trial of Mucinex and regular use of her nebulizers as previously prescribed.  Patient is very comfortable and agreeable with this plan.    Differential diagnosis, natural history, supportive care, and indications for immediate follow-up discussed.   Patient given instructions and understanding of medications and treatment.    If not improving in 3-5 days, F/U with PCP or return to  if symptoms worsen.  Strict ER precautions given.  Patient agreeable to plan.      Please note that this dictation was created using voice recognition software. I have made every reasonable attempt to correct obvious errors, but I expect that there are errors of grammar and possibly content that I did not discover before finalizing the note.    Rosalio  Peter BOSE

## 2024-01-11 ENCOUNTER — HOSPITAL ENCOUNTER (OUTPATIENT)
Dept: RADIOLOGY | Facility: MEDICAL CENTER | Age: 34
End: 2024-01-11
Attending: PHYSICIAN ASSISTANT
Payer: MEDICARE

## 2024-01-11 DIAGNOSIS — M54.12 RADICULOPATHY OF CERVICAL SPINE: ICD-10-CM

## 2024-01-11 PROCEDURE — 72141 MRI NECK SPINE W/O DYE: CPT

## 2024-01-15 ENCOUNTER — HOSPITAL ENCOUNTER (OUTPATIENT)
Facility: MEDICAL CENTER | Age: 34
End: 2024-01-16
Attending: STUDENT IN AN ORGANIZED HEALTH CARE EDUCATION/TRAINING PROGRAM | Admitting: STUDENT IN AN ORGANIZED HEALTH CARE EDUCATION/TRAINING PROGRAM
Payer: MEDICARE

## 2024-01-15 ENCOUNTER — HOSPITAL ENCOUNTER (OUTPATIENT)
Dept: RADIOLOGY | Facility: MEDICAL CENTER | Age: 34
End: 2024-01-15
Payer: MEDICARE

## 2024-01-15 DIAGNOSIS — R20.2 PARESTHESIAS: ICD-10-CM

## 2024-01-15 DIAGNOSIS — E86.0 DEHYDRATION: ICD-10-CM

## 2024-01-15 DIAGNOSIS — M54.12 CERVICAL RADICULOPATHY: ICD-10-CM

## 2024-01-15 DIAGNOSIS — R27.0 ATAXIA: ICD-10-CM

## 2024-01-15 DIAGNOSIS — G43.411 INTRACTABLE HEMIPLEGIC MIGRAINE WITH STATUS MIGRAINOSUS: ICD-10-CM

## 2024-01-15 DIAGNOSIS — M54.16 LUMBAR RADICULOPATHY: ICD-10-CM

## 2024-01-15 DIAGNOSIS — F41.9 ANXIETY: ICD-10-CM

## 2024-01-15 DIAGNOSIS — R32 URINARY INCONTINENCE, UNSPECIFIED TYPE: ICD-10-CM

## 2024-01-15 DIAGNOSIS — F60.3 BORDERLINE PERSONALITY DISORDER IN ADULT (HCC): ICD-10-CM

## 2024-01-15 DIAGNOSIS — R26.2 CANNOT WALK: ICD-10-CM

## 2024-01-15 PROCEDURE — 99285 EMERGENCY DEPT VISIT HI MDM: CPT

## 2024-01-15 ASSESSMENT — FIBROSIS 4 INDEX: FIB4 SCORE: 1.25

## 2024-01-16 ENCOUNTER — APPOINTMENT (OUTPATIENT)
Dept: RADIOLOGY | Facility: MEDICAL CENTER | Age: 34
End: 2024-01-16
Attending: STUDENT IN AN ORGANIZED HEALTH CARE EDUCATION/TRAINING PROGRAM
Payer: MEDICARE

## 2024-01-16 VITALS
RESPIRATION RATE: 18 BRPM | TEMPERATURE: 98.5 F | DIASTOLIC BLOOD PRESSURE: 61 MMHG | OXYGEN SATURATION: 92 % | BODY MASS INDEX: 42.68 KG/M2 | SYSTOLIC BLOOD PRESSURE: 101 MMHG | HEIGHT: 64 IN | WEIGHT: 250 LBS | HEART RATE: 103 BPM

## 2024-01-16 PROBLEM — R27.0 ATAXIA: Status: ACTIVE | Noted: 2024-01-16

## 2024-01-16 LAB
ALBUMIN SERPL BCP-MCNC: 4.5 G/DL (ref 3.2–4.9)
ALBUMIN/GLOB SERPL: 2.5 G/DL
ALP SERPL-CCNC: 61 U/L (ref 30–99)
ALT SERPL-CCNC: 22 U/L (ref 2–50)
ANION GAP SERPL CALC-SCNC: 14 MMOL/L (ref 7–16)
AST SERPL-CCNC: 14 U/L (ref 12–45)
BASOPHILS # BLD AUTO: 0.6 % (ref 0–1.8)
BASOPHILS # BLD: 0.04 K/UL (ref 0–0.12)
BILIRUB SERPL-MCNC: 0.4 MG/DL (ref 0.1–1.5)
BUN SERPL-MCNC: 10 MG/DL (ref 8–22)
CALCIUM ALBUM COR SERPL-MCNC: 8.6 MG/DL (ref 8.5–10.5)
CALCIUM SERPL-MCNC: 9 MG/DL (ref 8.5–10.5)
CHLORIDE SERPL-SCNC: 110 MMOL/L (ref 96–112)
CO2 SERPL-SCNC: 17 MMOL/L (ref 20–33)
CREAT SERPL-MCNC: 0.79 MG/DL (ref 0.5–1.4)
EOSINOPHIL # BLD AUTO: 0.09 K/UL (ref 0–0.51)
EOSINOPHIL NFR BLD: 1.4 % (ref 0–6.9)
ERYTHROCYTE [DISTWIDTH] IN BLOOD BY AUTOMATED COUNT: 41.6 FL (ref 35.9–50)
GFR SERPLBLD CREATININE-BSD FMLA CKD-EPI: 100 ML/MIN/1.73 M 2
GLOBULIN SER CALC-MCNC: 1.8 G/DL (ref 1.9–3.5)
GLUCOSE SERPL-MCNC: 143 MG/DL (ref 65–99)
HCT VFR BLD AUTO: 41.2 % (ref 37–47)
HGB BLD-MCNC: 14.2 G/DL (ref 12–16)
IMM GRANULOCYTES # BLD AUTO: 0.01 K/UL (ref 0–0.11)
IMM GRANULOCYTES NFR BLD AUTO: 0.2 % (ref 0–0.9)
LYMPHOCYTES # BLD AUTO: 2.21 K/UL (ref 1–4.8)
LYMPHOCYTES NFR BLD: 35.5 % (ref 22–41)
MCH RBC QN AUTO: 31.5 PG (ref 27–33)
MCHC RBC AUTO-ENTMCNC: 34.5 G/DL (ref 32.2–35.5)
MCV RBC AUTO: 91.4 FL (ref 81.4–97.8)
MONOCYTES # BLD AUTO: 0.7 K/UL (ref 0–0.85)
MONOCYTES NFR BLD AUTO: 11.2 % (ref 0–13.4)
NEUTROPHILS # BLD AUTO: 3.18 K/UL (ref 1.82–7.42)
NEUTROPHILS NFR BLD: 51.1 % (ref 44–72)
NRBC # BLD AUTO: 0 K/UL
NRBC BLD-RTO: 0 /100 WBC (ref 0–0.2)
PLATELET # BLD AUTO: 161 K/UL (ref 164–446)
PMV BLD AUTO: 11.6 FL (ref 9–12.9)
POTASSIUM SERPL-SCNC: 3.7 MMOL/L (ref 3.6–5.5)
PROT SERPL-MCNC: 6.3 G/DL (ref 6–8.2)
RBC # BLD AUTO: 4.51 M/UL (ref 4.2–5.4)
SODIUM SERPL-SCNC: 141 MMOL/L (ref 135–145)
WBC # BLD AUTO: 6.2 K/UL (ref 4.8–10.8)

## 2024-01-16 PROCEDURE — 700111 HCHG RX REV CODE 636 W/ 250 OVERRIDE (IP): Mod: UD | Performed by: STUDENT IN AN ORGANIZED HEALTH CARE EDUCATION/TRAINING PROGRAM

## 2024-01-16 PROCEDURE — 97162 PT EVAL MOD COMPLEX 30 MIN: CPT

## 2024-01-16 PROCEDURE — 72148 MRI LUMBAR SPINE W/O DYE: CPT

## 2024-01-16 PROCEDURE — 96374 THER/PROPH/DIAG INJ IV PUSH: CPT

## 2024-01-16 PROCEDURE — 97166 OT EVAL MOD COMPLEX 45 MIN: CPT

## 2024-01-16 PROCEDURE — 36415 COLL VENOUS BLD VENIPUNCTURE: CPT

## 2024-01-16 PROCEDURE — 99223 1ST HOSP IP/OBS HIGH 75: CPT | Mod: GC,AI | Performed by: STUDENT IN AN ORGANIZED HEALTH CARE EDUCATION/TRAINING PROGRAM

## 2024-01-16 PROCEDURE — 85025 COMPLETE CBC W/AUTO DIFF WBC: CPT

## 2024-01-16 PROCEDURE — 99239 HOSP IP/OBS DSCHRG MGMT >30: CPT | Performed by: STUDENT IN AN ORGANIZED HEALTH CARE EDUCATION/TRAINING PROGRAM

## 2024-01-16 PROCEDURE — A9270 NON-COVERED ITEM OR SERVICE: HCPCS | Mod: UD | Performed by: STUDENT IN AN ORGANIZED HEALTH CARE EDUCATION/TRAINING PROGRAM

## 2024-01-16 PROCEDURE — 96375 TX/PRO/DX INJ NEW DRUG ADDON: CPT

## 2024-01-16 PROCEDURE — 700102 HCHG RX REV CODE 250 W/ 637 OVERRIDE(OP): Mod: UD | Performed by: STUDENT IN AN ORGANIZED HEALTH CARE EDUCATION/TRAINING PROGRAM

## 2024-01-16 PROCEDURE — 80053 COMPREHEN METABOLIC PANEL: CPT

## 2024-01-16 PROCEDURE — 700105 HCHG RX REV CODE 258: Mod: UD | Performed by: STUDENT IN AN ORGANIZED HEALTH CARE EDUCATION/TRAINING PROGRAM

## 2024-01-16 PROCEDURE — 96372 THER/PROPH/DIAG INJ SC/IM: CPT

## 2024-01-16 PROCEDURE — G0378 HOSPITAL OBSERVATION PER HR: HCPCS

## 2024-01-16 PROCEDURE — 700111 HCHG RX REV CODE 636 W/ 250 OVERRIDE (IP): Mod: JZ,UD | Performed by: STUDENT IN AN ORGANIZED HEALTH CARE EDUCATION/TRAINING PROGRAM

## 2024-01-16 PROCEDURE — 72146 MRI CHEST SPINE W/O DYE: CPT

## 2024-01-16 RX ORDER — TOPIRAMATE 25 MG/1
50 TABLET ORAL 2 TIMES DAILY
Status: DISCONTINUED | OUTPATIENT
Start: 2024-01-16 | End: 2024-01-16 | Stop reason: HOSPADM

## 2024-01-16 RX ORDER — KETOROLAC TROMETHAMINE 15 MG/ML
15 INJECTION, SOLUTION INTRAMUSCULAR; INTRAVENOUS ONCE
Status: COMPLETED | OUTPATIENT
Start: 2024-01-16 | End: 2024-01-16

## 2024-01-16 RX ORDER — ALBUTEROL SULFATE 90 UG/1
2 AEROSOL, METERED RESPIRATORY (INHALATION) EVERY 6 HOURS PRN
Status: DISCONTINUED | OUTPATIENT
Start: 2024-01-16 | End: 2024-01-16 | Stop reason: HOSPADM

## 2024-01-16 RX ORDER — POLYETHYLENE GLYCOL 3350 17 G/17G
1 POWDER, FOR SOLUTION ORAL
Status: DISCONTINUED | OUTPATIENT
Start: 2024-01-16 | End: 2024-01-16 | Stop reason: HOSPADM

## 2024-01-16 RX ORDER — TIZANIDINE 4 MG/1
4 TABLET ORAL 2 TIMES DAILY
Status: DISCONTINUED | OUTPATIENT
Start: 2024-01-16 | End: 2024-01-16 | Stop reason: HOSPADM

## 2024-01-16 RX ORDER — PREGABALIN 75 MG/1
75 CAPSULE ORAL 2 TIMES DAILY
Status: DISCONTINUED | OUTPATIENT
Start: 2024-01-16 | End: 2024-01-16 | Stop reason: HOSPADM

## 2024-01-16 RX ORDER — OMEPRAZOLE 20 MG/1
20 CAPSULE, DELAYED RELEASE ORAL 2 TIMES DAILY
COMMUNITY

## 2024-01-16 RX ORDER — ENOXAPARIN SODIUM 100 MG/ML
40 INJECTION SUBCUTANEOUS DAILY
Status: DISCONTINUED | OUTPATIENT
Start: 2024-01-16 | End: 2024-01-16 | Stop reason: HOSPADM

## 2024-01-16 RX ORDER — BISACODYL 10 MG
10 SUPPOSITORY, RECTAL RECTAL
Status: DISCONTINUED | OUTPATIENT
Start: 2024-01-16 | End: 2024-01-16 | Stop reason: HOSPADM

## 2024-01-16 RX ORDER — CHOLECALCIFEROL (VITAMIN D3) 125 MCG
10 CAPSULE ORAL
Status: DISCONTINUED | OUTPATIENT
Start: 2024-01-16 | End: 2024-01-16 | Stop reason: HOSPADM

## 2024-01-16 RX ORDER — METOPROLOL SUCCINATE 25 MG/1
25 TABLET, EXTENDED RELEASE ORAL DAILY
Status: DISCONTINUED | OUTPATIENT
Start: 2024-01-16 | End: 2024-01-16 | Stop reason: HOSPADM

## 2024-01-16 RX ORDER — DOXYCYCLINE 100 MG/1
100 CAPSULE ORAL 2 TIMES DAILY
Status: SHIPPED | COMMUNITY
Start: 2023-12-31 | End: 2024-01-16

## 2024-01-16 RX ORDER — DIPHENHYDRAMINE HYDROCHLORIDE 50 MG/ML
12.5 INJECTION INTRAMUSCULAR; INTRAVENOUS ONCE
Status: COMPLETED | OUTPATIENT
Start: 2024-01-16 | End: 2024-01-16

## 2024-01-16 RX ORDER — DEXAMETHASONE SODIUM PHOSPHATE 4 MG/ML
6 INJECTION, SOLUTION INTRA-ARTICULAR; INTRALESIONAL; INTRAMUSCULAR; INTRAVENOUS; SOFT TISSUE ONCE
Status: COMPLETED | OUTPATIENT
Start: 2024-01-16 | End: 2024-01-16

## 2024-01-16 RX ORDER — SODIUM CHLORIDE 9 MG/ML
INJECTION, SOLUTION INTRAVENOUS ONCE
Status: COMPLETED | OUTPATIENT
Start: 2024-01-16 | End: 2024-01-16

## 2024-01-16 RX ORDER — MIDAZOLAM HYDROCHLORIDE 1 MG/ML
2 INJECTION INTRAMUSCULAR; INTRAVENOUS ONCE
Status: COMPLETED | OUTPATIENT
Start: 2024-01-16 | End: 2024-01-16

## 2024-01-16 RX ORDER — AMOXICILLIN 250 MG
2 CAPSULE ORAL 2 TIMES DAILY
Status: DISCONTINUED | OUTPATIENT
Start: 2024-01-16 | End: 2024-01-16 | Stop reason: HOSPADM

## 2024-01-16 RX ORDER — DIPHENHYDRAMINE HCL 25 MG
50 TABLET ORAL NIGHTLY PRN
Status: DISCONTINUED | OUTPATIENT
Start: 2024-01-16 | End: 2024-01-16

## 2024-01-16 RX ORDER — SODIUM CHLORIDE, SODIUM LACTATE, POTASSIUM CHLORIDE, CALCIUM CHLORIDE 600; 310; 30; 20 MG/100ML; MG/100ML; MG/100ML; MG/100ML
INJECTION, SOLUTION INTRAVENOUS ONCE
Status: COMPLETED | OUTPATIENT
Start: 2024-01-16 | End: 2024-01-16

## 2024-01-16 RX ORDER — LAMOTRIGINE 100 MG/1
50 TABLET ORAL 2 TIMES DAILY
Status: DISCONTINUED | OUTPATIENT
Start: 2024-01-16 | End: 2024-01-16 | Stop reason: HOSPADM

## 2024-01-16 RX ORDER — PREDNISONE 20 MG/1
20 TABLET ORAL DAILY
Status: SHIPPED | COMMUNITY
End: 2024-02-07

## 2024-01-16 RX ORDER — ZIPRASIDONE HYDROCHLORIDE 40 MG/1
40 CAPSULE ORAL
Status: DISCONTINUED | OUTPATIENT
Start: 2024-01-16 | End: 2024-01-16 | Stop reason: HOSPADM

## 2024-01-16 RX ORDER — PROCHLORPERAZINE EDISYLATE 5 MG/ML
10 INJECTION INTRAMUSCULAR; INTRAVENOUS ONCE
Status: COMPLETED | OUTPATIENT
Start: 2024-01-16 | End: 2024-01-16

## 2024-01-16 RX ADMIN — MIDAZOLAM HYDROCHLORIDE 2 MG: 1 INJECTION, SOLUTION INTRAMUSCULAR; INTRAVENOUS at 02:13

## 2024-01-16 RX ADMIN — TIZANIDINE 4 MG: 4 TABLET ORAL at 07:52

## 2024-01-16 RX ADMIN — PROCHLORPERAZINE EDISYLATE 10 MG: 5 INJECTION INTRAMUSCULAR; INTRAVENOUS at 00:42

## 2024-01-16 RX ADMIN — KETOROLAC TROMETHAMINE 15 MG: 15 INJECTION, SOLUTION INTRAMUSCULAR; INTRAVENOUS at 00:42

## 2024-01-16 RX ADMIN — LAMOTRIGINE 50 MG: 100 TABLET ORAL at 06:34

## 2024-01-16 RX ADMIN — DIPHENHYDRAMINE HYDROCHLORIDE 12.5 MG: 50 INJECTION, SOLUTION INTRAMUSCULAR; INTRAVENOUS at 00:45

## 2024-01-16 RX ADMIN — SODIUM CHLORIDE: 9 INJECTION, SOLUTION INTRAVENOUS at 00:44

## 2024-01-16 RX ADMIN — METOPROLOL SUCCINATE 25 MG: 25 TABLET, EXTENDED RELEASE ORAL at 06:35

## 2024-01-16 RX ADMIN — SODIUM CHLORIDE, POTASSIUM CHLORIDE, SODIUM LACTATE AND CALCIUM CHLORIDE: 600; 310; 30; 20 INJECTION, SOLUTION INTRAVENOUS at 02:13

## 2024-01-16 RX ADMIN — DEXAMETHASONE SODIUM PHOSPHATE 6 MG: 4 INJECTION INTRA-ARTICULAR; INTRALESIONAL; INTRAMUSCULAR; INTRAVENOUS; SOFT TISSUE at 00:42

## 2024-01-16 RX ADMIN — ENOXAPARIN SODIUM 40 MG: 100 INJECTION SUBCUTANEOUS at 05:50

## 2024-01-16 RX ADMIN — PREGABALIN 75 MG: 75 CAPSULE ORAL at 06:35

## 2024-01-16 RX ADMIN — TOPIRAMATE 50 MG: 25 TABLET, FILM COATED ORAL at 07:52

## 2024-01-16 ASSESSMENT — ENCOUNTER SYMPTOMS
EYE PAIN: 0
SHORTNESS OF BREATH: 0
VOMITING: 0
SEIZURES: 0
HEADACHES: 0
SENSORY CHANGE: 1
BLURRED VISION: 0
COUGH: 0
FOCAL WEAKNESS: 1
NAUSEA: 0
SINUS PAIN: 0
ABDOMINAL PAIN: 0
MYALGIAS: 1
SPUTUM PRODUCTION: 0
PALPITATIONS: 0
SPEECH CHANGE: 0
FEVER: 0
DOUBLE VISION: 0
FALLS: 0
CHILLS: 0
HEARTBURN: 0
BACK PAIN: 1
DIZZINESS: 1

## 2024-01-16 ASSESSMENT — ACTIVITIES OF DAILY LIVING (ADL): TOILETING: INDEPENDENT

## 2024-01-16 ASSESSMENT — COGNITIVE AND FUNCTIONAL STATUS - GENERAL
DRESSING REGULAR LOWER BODY CLOTHING: A LITTLE
TOILETING: A LITTLE
CLIMB 3 TO 5 STEPS WITH RAILING: A LOT
HELP NEEDED FOR BATHING: A LITTLE
WALKING IN HOSPITAL ROOM: A LITTLE
MOBILITY SCORE: 17
TURNING FROM BACK TO SIDE WHILE IN FLAT BAD: A LITTLE
SUGGESTED CMS G CODE MODIFIER DAILY ACTIVITY: CJ
DAILY ACTIVITIY SCORE: 20
DRESSING REGULAR UPPER BODY CLOTHING: A LITTLE
STANDING UP FROM CHAIR USING ARMS: A LITTLE
MOVING TO AND FROM BED TO CHAIR: A LITTLE
SUGGESTED CMS G CODE MODIFIER MOBILITY: CK
MOVING FROM LYING ON BACK TO SITTING ON SIDE OF FLAT BED: A LITTLE

## 2024-01-16 ASSESSMENT — GAIT ASSESSMENTS
ASSISTIVE DEVICE: FRONT WHEEL WALKER
DISTANCE (FEET): 8
GAIT LEVEL OF ASSIST: CONTACT GUARD ASSIST

## 2024-01-16 NOTE — ED NOTES
Pt is sleeping in [facility] with unlabored breathing. Connected to cardiac monitor. VSS. Will continue to monitor.

## 2024-01-16 NOTE — H&P
[facility] Internal Medicine History & Physical Note    Date of Service  1/16/2024    [facility] Team: DINESH   Attending: Gordo Toribio M.d.  Senior Resident: Dr. Vallejo  Contact Number: [phone]    Primary Care Physician  Torres Brody M.D.    Consultants  neurosurgery    Code Status  Prior    Chief Complaint  Chief Complaint   Patient presents with    Numbness     Off balance  Left leg tingling and loss of sensation        History of Presenting Illness (HPI):   Kristin Balderrama is a 33 y.o. woman with hx of idiopathic intracranial hypertension, normal pressure hydrocephalus, chronic pain, borderline personality, asthma, and morbid obesity who presented 1/15/2024 with ataxia. Pt states symptoms started yesterday morning. Noticed dizziness and left leg weakness/numbness which affected her ability to walk. Normally uses a cane to walk when needed but was unable to walk by herself today. Also has noticed worsening urinary and bowel incontinence, states has device which usually controls these symptoms but has not in the last day. Denies headaches, other extremity symptoms, or blurry vision. Initially presented to [facility] where CT head was negative. Pt transferred here for neurosurgery evaluation. Notes that she has not taken any of her usual meds since 1/15 at 0600.     In the ED, vitals wnl. Labs wnl. MRI lumbar and thoracic spine negative but pending official read. She was given compazine 10mg, benadryl 12.5mg, toradol 15mg, dexamethasone 6mg, and 1L LR bolus in the ED.     I discussed the plan of care with patient.    Review of Systems  Review of Systems   Constitutional:  Negative for chills, fever and malaise/fatigue.   HENT:  Negative for ear pain, hearing loss and sinus pain.    Eyes:  Negative for blurred vision, double vision and pain.   Respiratory:  Negative for cough, sputum production and shortness of breath.    Cardiovascular:  Negative for chest pain, palpitations and leg swelling.    Gastrointestinal:  Negative for abdominal pain, heartburn, nausea and vomiting.   Genitourinary:  Negative for dysuria and urgency.        + incontinence   Musculoskeletal:  Positive for back pain and myalgias. Negative for falls.   Skin:  Negative for itching and rash.   Neurological:  Positive for dizziness, sensory change and focal weakness. Negative for speech change, seizures and headaches.       Past Medical History   has a past medical history of Abdominal pain, Anginal syndrome, Apnea, sleep, Arrhythmia, Arthritis, ASTHMA, Back pain, Borderline personality disorder ([facility]), Breath shortness, Bronchitis (02/08/2022), Cardiac arrhythmia, Chickenpox, Chronic UTI (09/18/2010), Cough, Daytime sleepiness, Dental disorder (03/08/2021), Depression, Diabetes ([facility]), Diarrhea, Disorder of thyroid, Fall, Fatigue, Frequent headaches, Gasping for breath, Gynecological disorder, Headache(784.0), Heart burn, Heart murmur, History of falling, Indigestion, Migraine, Mitochondrial disease ([facility]), 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[facility]), Nausea, Obesity, Other fatigue (06/29/2020), Pain (08/15/2012), Painful joint, PCOS (polycystic ovarian syndrome), Pneumonia (2012 12/2020), POTS (postural orthostatic tachycardia syndrome), Psychosis ([facility]), Ringing in ears, Scoliosis, Shortness of breath, Sinus tachycardia (10/31/2013), Sleep apnea, Snoring, Supraventricular tachycardia (4/10/2019), Tonsillitis, 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    Surgical History   has a past surgical history that includes neuro dest facet l/s w/ig sngl (04/21/2015); recovery (01/27/2016); delmar by laparoscopy (08/29/2010); lumbar fusion anterior (08/21/2012); other cardiac surgery (01/2016); tonsillectomy; bowel stimulator insertion (07/13/2016); gastroscopy with balloon dilatation (N/A, 01/04/2017); muscle biopsy (Right, 01/26/2017); other abdominal surgery; laminotomy; bowel  "stimulator insertion (03/10/2021); pr lap,diagnostic abdomen (02/14/2022); ovarian cystectomy (Right, 02/14/2022); pr percut fix prox/neck femur fx (Left, 01/28/2023); inguinal hernia laparoscopic (Right, 07/21/2023); and hernia repair (Right, 07/21/2023).     Family History  family history includes Genitourinary () Problems in her sister; Heart Disease in her maternal grandmother and mother; Hypertension in her maternal grandmother, maternal uncle, and mother; No Known Problems in her sister; Other in her mother and sister; Sleep Apnea in her mother.   Family history reviewed with patient.     Social History  Tobacco: Never smoker  Alcohol: Denies  Recreational drugs (illegal or prescription): Denies  Employment: N/A  Living Situation: N/A  Recent Travel: N/A  Primary Care Provider: Reviewed Torres Brody M.D.  Other (stressors, spirituality, exposures): N/A    Allergies  Allergies   Allergen Reactions    Cefdinir Shortness of Breath and Itching     Tolerated 1/18/17  Tolerates ceftriaxone  Tolerated augmentin 8/2019     Depakote [Divalproex Sodium] Unspecified     Muscle spasms/muscle pain and weakness      Doxycycline Anaphylaxis and Vomiting     Other reaction(s): pustules/blisters  Other reaction(s): stomach pain    Montelukast [Singulair] Unspecified     Cardiac effusion    Vancomycin Itching     Pt becomes flushed in face and chest.   RXN=7/10/16    Wound Dressing Adhesive Rash     By pt report-\"removes skin totally off\"    Amitriptyline Unspecified     Headaches      Aripiprazole Unspecified    Aripiprazole [Abilify] Unspecified     Headaches/muscle twitching      Clindamycin Nausea         Other reaction(s): nausea stomach pain    Flomax [Tamsulosin Hydrochloride] Swelling    Levaquin Unspecified     Severe muscle cramps in legs  RXN=unknown  Other reaction(s): leg muscle cramps    Metformin Unspecified     Increased lactic acid     Other reaction(s): itching and rash/nausea vomiting    Tamsulosin " "Swelling     Swelling of legs    Tape Rash     Tears skin off  coban with Tegaderm tape ok intermittently  RXN=ongoing    Amoxicillin Rash    Depakote [Divalproex Sodium]     Erythromycin Rash     .  Other reaction(s): nausea stomach pain    Hydromorphone      Other reaction(s): vomiting    Montelukast     Ampicillin Rash     Pt reports that she received a rash     Ciprofloxacin Rash          Keflex Rash     Pt states she gets a rash with this medication  Tolerates ceftriaxone  Can take with Benadryl    Levofloxacin Unspecified     Leg muscle cramps    Metronidazole Rash     \"Vision problems\"  Other reaction(s): vision problems    Penicillins Hives     Can take with Benadryl    Sulfa Drugs Itching, Myalgia and Hives     Muscle pain and weakness  Other reaction(s): unknown    Valproic Acid Rash     .       Medications  Prior to Admission Medications   Prescriptions Last Dose Informant Patient Reported? Taking?   Adalimumab 80 MG/0.8ML Pen-injector Kit  Patient Yes No   Sig: Inject 80 mg under the skin every 14 days.   CLINDAMYCIN PHOSPHATE,TOPICAL, 1 % Lotion   No No   Sig: Apply to skin as directed for folliculitis x 10 days.   Galcanezumab-gnlm (EMGALITY) 120 MG/ML Solution Auto-injector   Yes No   Sig: Inject  under the skin.   Melatonin 10 MG Tab  Patient Yes No   Sig: Take 10 mg by mouth at bedtime.   OXYGEN-HELIUM INH   Yes No   Si to 4 LPM as needed via nasal cannula   albuterol (PROVENTIL) 2.5mg/3ml Nebu Soln solution for nebulization   No No   Sig: Take 3 mL by nebulization every four hours as needed for Shortness of Breath.   albuterol 108 (90 Base) MCG/ACT Aero Soln inhalation aerosol  Patient No No   Sig: Inhale 2 Puffs every 6 hours as needed for Shortness of Breath.   albuterol-ipratropium (COMBIVENT)  MCG/ACT Aerosol   No No   Sig: Inhale 2 Puffs every 6 hours as needed for Shortness of Breath. Use with spacer   azithromycin (ZITHROMAX) 250 MG Tab   No No   Sig: Take two tabs on day one " followed by one tab on days 2-5.   Patient not taking: Reported on 2023   benzonatate (TESSALON) 200 MG capsule   No No   Sig: Take 1 Capsule by mouth 3 times a day as needed for Cough.   Patient not taking: Reported on 2023   diphenhydrAMINE (BENADRYL) 50 MG tablet  Patient Yes No   Sig: Take 50 mg by mouth at bedtime.   docusate sodium (STOOL SOFTENER) 250 MG capsule  Patient Yes No   Sig: Take 250 mg by mouth 2 times a day.   etonogestrel (NEXPLANON) 68 MG Implant implant  Patient Yes No   Sig: Inject 1 Each under the skin see administration instructions. Pt reports she is not sure when she had this medications injected last, pt reports she still has it in her arm  Every 3 years to change   fluconazole (DIFLUCAN) 150 MG tablet  Patient Yes No   Sig: Take 150 mg by mouth every Wednesday.   fluticasone (FLONASE) 50 MCG/ACT nasal spray   No No   Sig: Administer 2 Sprays into affected nostril(S) at bedtime.   ivabradine (CORLANOR) 7.5 MG Tab tablet   No No   Sig: Take 1 Tablet by mouth 2 times a day with meals.   ketorolac (ACULAR) 0.5 % Solution   No No   Sig: Administer 1 Drop into both eyes 4 times a day.   Patient not taking: Reported on 2023   ketorolac (TORADOL) 10 MG Tab   No No   Sig: Take 1 Tablet by mouth 3 times a day as needed for Moderate Pain.   lamoTRIgine (LAMICTAL) 25 MG Tab   Yes No   Sig: Take 2 tablet by mouth twice a day   metoprolol SR (TOPROL XL) 25 MG TABLET SR 24 HR  Patient No No   Sig: Take 1 Tablet by mouth every day.   mupirocin (BACTROBAN) 2 % Ointment   No No   Sig: Apply 1 Application topically 2 times a day.   Patient not taking: Reported on 2023   naproxen (NAPROSYN) 500 MG Tab  Patient No No   Sig: Take 1 Tablet by mouth 2 times a day with meals.   Patient not taking: Reported on 2023   predniSONE (DELTASONE) 20 MG Tab   No No   Sig: Take 1 tab twice daily for 5 days.   pregabalin (LYRICA) 75 MG Cap   Yes No   Si CAPSULE TWICE A DAY QTY 60 FOR 30 DAY    sodium chloride (SALT) 1 GM Tab  Patient No No   Sig: Take 1 Tablet by mouth 3 times a day with meals.   tizanidine (ZANAFLEX) 4 MG Tab  Patient Yes No   Sig: Take 4 mg by mouth 3 times a day.   topiramate (TOPAMAX) 25 MG Tab  Patient Yes No   Sig: Take 25-50 mg by mouth 2 times a day. 1 tablet (25 mg) in the morning 2 tablets (50 mg) at bedtime   topiramate (TOPAMAX) 25 MG Tab   Yes No   Sig: Take 25 Tablets by mouth 2 times a day.   Patient not taking: Reported on 9/14/2023   ziprasidone (GEODON) 40 MG Cap  Patient No No   Sig: Take 1 capsule by mouth at bedtime.      Facility-Administered Medications: None       Physical Exam  Temp:  [37.2 °C (99 °F)] 37.2 °C (99 °F)  Pulse:  [81-99] 99  Resp:  [16-20] 20  BP: (113-156)/(61-93) 126/78  SpO2:  [93 %-97 %] 93 %  Blood Pressure: 126/78   Temperature: 37.2 °C (99 °F)   Pulse: 99   Respiration: 20   Pulse Oximetry: 93 %       Physical Exam  Constitutional:       Appearance: Normal appearance. She is obese.   HENT:      Head: Normocephalic and atraumatic.      Nose: Nose normal.      Mouth/Throat:      Mouth: Mucous membranes are moist.      Pharynx: Oropharynx is clear. No oropharyngeal exudate.   Eyes:      General: No scleral icterus.     Extraocular Movements: Extraocular movements intact.      Conjunctiva/sclera: Conjunctivae normal.      Pupils: Pupils are equal, round, and reactive to light.   Cardiovascular:      Rate and Rhythm: Normal rate and regular rhythm.      Pulses: Normal pulses.      Heart sounds: Normal heart sounds. No murmur heard.  Pulmonary:      Effort: Pulmonary effort is normal. No respiratory distress.      Breath sounds: Normal breath sounds. No wheezing.   Abdominal:      General: Bowel sounds are normal.      Palpations: Abdomen is soft. There is no mass.      Tenderness: There is no abdominal tenderness.   Musculoskeletal:         General: No swelling or tenderness. Normal range of motion.      Cervical back: Normal range of motion and  neck supple. No rigidity.   Skin:     General: Skin is warm and dry.      Coloration: Skin is not jaundiced or pale.   Neurological:      Mental Status: She is alert and oriented to person, place, and time.      Cranial Nerves: No cranial nerve deficit.      Sensory: Sensory deficit present.      Motor: Weakness present.      Comments: Decreased sensation to touch of LLE compared to R. 4+/5 strength in LLE, 5/5 strength in all other extremities. CN II - CN XII wnl. Negative HINTS exam.          Laboratory:  Recent Labs     01/16/24 0009   WBC 6.2   RBC 4.51   HEMOGLOBIN 14.2   HEMATOCRIT 41.2   MCV 91.4   MCH 31.5   MCHC 34.5   RDW 41.6   PLATELETCT 161*   MPV 11.6     Recent Labs     01/16/24 0009   SODIUM 141   POTASSIUM 3.7   CHLORIDE 110   CO2 17*   GLUCOSE 143*   BUN 10   CREATININE 0.79   CALCIUM 9.0     Recent Labs     01/16/24 0009   ALTSGPT 22   ASTSGOT 14   ALKPHOSPHAT 61   TBILIRUBIN 0.4   GLUCOSE 143*       Imaging:  OUTSIDE IMAGES-CT HEAD   Final Result      MR-LUMBAR SPINE-W/O    (Results Pending)   MR-THORACIC SPINE-W/O    (Results Pending)       no X-Ray or EKG requiring interpretation    Assessment/Plan:  Problem Representation:   I anticipate this patient is appropriate for observation status at this time because of ataxia requiring neurosurgery evaluation    Patient will need a Med/Surg bed on NEUROLOGY service .  The need is secondary to above.    * Ataxia- (present on admission)  Assessment & Plan  Pt with worsening ataxia and incontinence in last 24h  Possibly related to NPH (being evaluated for  shunt as outpatient)  - Imaging negative for acute hydrocephalus or spinal compression/lesion  - Neurosurgery consult in am  - Continue home meds  - Q4h neurochecks  - PT/OT when appropriate    Postural orthostatic tachycardia syndrome- (present on admission)  Assessment & Plan  Continue home ivabradine    Moderate asthma- (present on admission)  Assessment & Plan  Continue home inhaler    Normal  pressure hydrocephalus (HCC)- (present on admission)  Assessment & Plan  No evidence of hydrocephalus on imaging  - Neurosurgery consult in am    Seizures (HCC)- (present on admission)  Assessment & Plan  Continue home lamictal    IIH (idiopathic intracranial hypertension)- (present on admission)  Assessment & Plan  Hx of    Morbidly obese (HCC)- (present on admission)  Assessment & Plan  Weight loss counseling when appropriate    Borderline personality disorder in adult (HCC)- (present on admission)  Assessment & Plan  Continue home Topamax and Geodon      VTE prophylaxis: enoxaparin ppx

## 2024-01-16 NOTE — ED NOTES
This RN was contacted by Pharmacist Ken Cao about patient's home meds (Ivabradine). Pt's friend will come over to bring it in at around 11AM. Pharmacist made aware.

## 2024-01-16 NOTE — THERAPY
"Occupational Therapy   Initial Evaluation     Patient Name: Kristin Balderrama  Age:  33 y.o., Sex:  female  Medical Record #: [id number]  Today's Date: 1/16/2024     Precautions  Precautions: Fall Risk    Assessment  Patient is a 33 y.o. female with a history of idiopathic intracranial hypertension, normal pressure hydrocephalus, chronic pain, borderline personality, asthma, and morbid obesity who presented 1/15/2024 with ataxia. Imaging negative for acute hydrocephalus or spinal compression/lesion. Pt be discharged from the ED and will follow up with her outpatient neurosurgeon for further work-up as needed.     During OT eval, pt presents with balance impairment. She has a wheelchair at home that she can use but it does not fit into her bedroom or bathroom. Pt demonstrated the ability to walk the few feet needed to enter her bedroom. Recommend bedside commode use since she is unable to enter her bathroom which would require her to walk further. Pt educated on use of commode liners so that she can empty her own commode. Plan is for patient to go home at wheelchair level and receive HH therapy.      Plan    Occupational Therapy Initial Treatment Plan   Duration: Evaluation Only     DC Equipment Recommendations: Bed Side Commode  Discharge Recommendations: Recommend home health for continued occupational therapy services     Subjective    \"Usually I'm steady on my feet.\"      Objective       01/16/24 1033   Prior Living Situation   Housing / Facility 2 Story House  (basement)   Steps Into Home   (ramp)   Steps In Home   (flight down to the basement)   Bathroom Set up Walk In Shower;Grab Bars   Equipment Owned 4-Wheel Walker;Single Point Cane;Grab Bar(s) In Tub / Shower;Grab Bar(s) By Toilet;Wheelchair  (trapeze for the bed, toilet aid)   Lives with - Patient's Self Care Capacity Related Adult   Comments Pt lives with her grandmother and her aunt   Prior Level of ADL Function   Self Feeding Independent   Grooming / " Hygiene Independent   Bathing Independent   Dressing Independent   Toileting Independent   Comments Pt reports that her shower is too small to use a shower chair.   Prior Level of IADL Function   Medication Management Independent   Kitchen Mobility Independent   Prior Level Of Mobility Independent With Device in Community   Driving / Transportation Driving Independent   Occupation (Pre-Hospital Vocational) Retired Due To Disability   Precautions   Precautions Fall Risk   Vitals   O2 Delivery Device None - Room Air   Cognition    Level of Consciousness Alert   Comments pleasant and cooperative   Active ROM Upper Body   Active ROM Upper Body  WDL   Dominant Hand Right   Strength Upper Body   Upper Body Strength  X   Comments generalized weakness distally which pt reports has been ongoing and she has been working with outpatient physicians to get it figured out   Sensation Upper Body   Upper Extremity Sensation  WDL   Upper Body Muscle Tone   Upper Body Muscle Tone  WDL   Coordination Upper Body   Coordination X   Fine Motor Coordination Pt reports poor fine motor coordination bilaterally and reports she frequently drops items at home   Balance Assessment   Sitting Balance (Static) Fair -   Sitting Balance (Dynamic) Fair -   Standing Balance (Static) Poor +   Standing Balance (Dynamic) Poor +   Weight Shift Sitting Good   Weight Shift Standing Fair   Comments FWW   Bed Mobility    Supine to Sit Standby Assist   Sit to Supine Standby Assist   Scooting Standby Assist   ADL Assessment   Eating Independent   Lower Body Dressing Minimal Assist  (shoes with set-up figure 4 at EOB, assist for balance while pulling brief up over hips)   Toileting Minimal Assist  (incontinent of bladder, able to doff old brief, Gloria for balance when pulling clean brief up over hips)   6 Clicks Daily Activity Score 20   Functional Mobility   Sit to Stand Contact Guard Assist   Bed, Chair, Wheelchair Transfer Contact Guard Assist   Education  Group   Education Provided Role of Occupational Therapist;Activities of Daily Living;Adaptive Equipment   Role of Occupational Therapist Patient Response Patient;Acceptance;Explanation;Verbal Demonstration   ADL Patient Response Patient;Acceptance;Explanation;Demonstration;Verbal Demonstration   Adaptive Equipment Patient Response Patient;Acceptance;Explanation;Verbal Demonstration   Additional Comments Recommend BSC for home, pt educated on how to use commode liners since she will need to empty her own commode   Occupational Therapy Initial Treatment Plan    Duration Discharge Needs Only   Problem List   Problem List Impaired Postural Control / Balance

## 2024-01-16 NOTE — ED TRIAGE NOTES
Chief Complaint   Patient presents with    Numbness     Off balance  Left leg tingling and loss of sensation      Pt transfer from [facility][1] for Neuro surgery consultation and MRI. Pt stated she was in her surgery dr clinic and started feeling off balance, she was also complaining of left left tingling and dull sensation. Pt is normally incontinence but today her symptoms were worst. Pt also reported of being dizzy today, pt received medication from another  ER and also received 4 mg Zofran en route by EMA.     Pt vitals with EMA: 137/79, HR 80b/mnt, sats 97% RA and GCS 15

## 2024-01-16 NOTE — ASSESSMENT & PLAN NOTE
Pt with worsening ataxia and incontinence in last 24h  Possibly related to NPH (being evaluated for  shunt as outpatient)  - Imaging negative for acute hydrocephalus or spinal compression/lesion  - Neurosurgery consult in am  - Continue home meds  - Q4h neurochecks  - PT/OT when appropriate

## 2024-01-16 NOTE — ED NOTES
Pt assisted to bedside commode with assistance from tech, pt able to move from commode to bed with assistance from tech without incident.

## 2024-01-16 NOTE — ED NOTES
"Pt discharged to home w/ grandmother. Pt using FWW. IV discontinued and gauze placed, pt in possession of belongings. Pt provided discharge education and information pertaining to medications and follow up appointments. Pt received copy of discharge instructions and verbalized understanding. /61   Pulse (!) 103   Temp 36.9 °C (98.5 °F) (Temporal)   Resp 18   Ht 1.626 m (5' 4\")   Wt 113 kg (250 lb)   SpO2 92%   BMI 42.91 kg/m²   "

## 2024-01-16 NOTE — DISCHARGE INSTRUCTIONS
Discharge Instructions per Earl Patel M.D.    You were observed for difficulty walking (atxia) for which CT head and MRI of spine did not demonstrate any recent changes or abnormalities. You are at risk of medication-induced weakness from sedation and from steroid-induced muscle breakdown (myopathy) for which you should review needs for medications with your specialists.    A Neurosurgeon from the [facility] recommended you follow up with Dr. Velasquez in clinic as scheduled.    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

## 2024-01-16 NOTE — THERAPY
Physical Therapy   Initial Evaluation     Patient Name: Kristin Balderrama  Age:  33 y.o., Sex:  female  Medical Record #: [id number]  Today's Date: 1/16/2024     Precautions  Precautions: Fall Risk    Assessment  Patient is 33 y.o. female admitted with ataxia, LLE weakness & numbness, and increased incontinence.  PMH includes NPH s/p  shunt, POTS, asthma, idiopathic intracranial hypertensin, obesity, and borderline personality disorder.  Imaging negative for acute changes.    Pt presented for physical therapy evaluation with balance deficits and decreased functional mobility compared to baseline.  Pt reported she is able to use her WC in part of her home however it will not fit in bedroom or bathroom. Pt demo'd ability to ambulate short distance as would be needed at home to access bedroom with FWW, CGA provided for safety due to balance deficits.  Educated on use of FWW to walk short distance to bedroom at home and continue working on mobility with  PT.      Plan    Physical Therapy Initial Treatment Plan   Duration: Evaluation only    DC Equipment Recommendations: Front-Wheel Walker (order placed)  Discharge Recommendations: Recommend home health for continued physical therapy services     Objective     01/16/24 1032   Precautions   Precautions Fall Risk   Prior Living Situation   Housing / Facility 1 Story House (+ basement)   Steps Into Home (ramp)   Equipment Owned 4-Wheel Walker;Single Point Cane;Wheelchair (trapeze for bed)   Lives with - Patient's Self Care Capacity Related Adult   Comments Pt lives with her grandmother & aunt   Prior Level of Functional Mobility   Bed Mobility Independent   Transfer Status Independent   Ambulation Independent   Assistive Devices Used Single Point Cane   History of Falls   History of Falls Yes   Cognition    Level of Consciousness Alert   Comments Pleasant & cooperative   Passive ROM Lower Body   Passive ROM Lower Body WDL   Active ROM Lower Body    Comments limited by  weakness & inconsistent effort   Strength Lower Body   Comments Generalized weakness, inconsistent with functional mobility vs formal MMT   Sensation Lower Body   Comments Endorsed BLE numbness   Coordination Lower Body    Comments Inconsistent   Balance Assessment   Sitting Balance (Static) Fair -   Sitting Balance (Dynamic) Fair -   Standing Balance (Static) Poor +   Standing Balance (Dynamic) Poor +   Weight Shift Sitting Good   Weight Shift Standing Fair   Bed Mobility    Supine to Sit Standby Assist   Sit to Supine Standby Assist   Scooting Standby Assist   Gait Analysis   Gait Level Of Assist Contact Guard Assist   Assistive Device Front Wheel Walker   Distance (Feet) 8   # of Times Distance was Traveled 2   Deviation (wide ISAAC, hurried, short steps)   Weight Bearing Status No restrictions   Functional Mobility   Sit to Stand Contact Guard Assist   Bed, Chair, Wheelchair Transfer Contact Guard Assist   Education Group   Education Provided Role of Physical Therapist;Use of Assistive Device   Role of Physical Therapist Patient Response Patient;Acceptance;Explanation;Verbal Demonstration   Use of Assistive Device Patient Response Patient;Acceptance;Explanation;Verbal Demonstration   Physical Therapy Initial Treatment Plan    Duration Evaluation only

## 2024-01-16 NOTE — RESPIRATORY CARE
COPD EDUCATION by COPD CLINICAL EDUCATOR  1/16/2024 at 8:04 AM by Meagan Willams RRT     Patient reviewed by COPD education team. Patient does not have a history or diagnosis of COPD and is a non-smoker.  Therefore, patient does not qualify for the COPD program.

## 2024-01-16 NOTE — DISCHARGE SUMMARY
Discharge Summary    CHIEF COMPLAINT ON ADMISSION  Chief Complaint   Patient presents with    Numbness     Off balance  Left leg tingling and loss of sensation        Reason for Admission  Weakness    Admission Date  1/15/2024    CODE STATUS  Full Code    HPI & HOSPITAL COURSE  This is a 33 y.o. female with BPDO, reported IIH/NPH/POTS, chronic pain, bladder incontinence s/p sacral stimulator, and cervical neuralgia here with leg weakness.    She reported acute-on-chronic dizziness and difficulty walking with her cane. She believes he sacral stimulator has been deteriorating for which she reported increased bowel/bladder dysfunction. She is followed for cervical neuralgia at [facility] for planned referral to Dr. Velasquez. She presented to [facility] for these symptoms where a CTH was without acute process or ventriculomegaly / hydrocephalus. She was referred to [facility] for Neurosurgery evaluation. MRI L-spine demonstrated postoperative changes at L4-L5 without significant canal nor foraminal stenosis. MRI T-spine was normal. MRI c-spine from 1/11/24 was normal. She was observed to utilize the bedside commode independently utilizing the bed for balance. Imaging and clinical evaluation were discussed with the on-call [facility] provider Dr. Pa, who advised no acute surgical intervention warranted and to follow up as planned with Dr. Velasquez. She was evaluated by PT/OT with recommendation for  PT/OT and FWW, which were delivered and confirmed prior to discharge.    Therefore, she is discharged in fair and stable condition to home with organized home healthcare and close outpatient follow-up.    The patient recovered much more quickly than anticipated on admission.    Discharge Date  1/16/2024    FOLLOW UP ITEMS POST DISCHARGE    Cervical Neuralgia, ?IIH or ?NPH - follow up with neurosurgeon Dr. Velasquez at [facility]    DISCHARGE DIAGNOSES  Principal Problem:    Ataxia (POA: Yes)  Active Problems:    Borderline  personality disorder in adult (HCC) (POA: Yes)    Peripheral neuropathy and chronic pain syndrome (CMS-HCC) (Chronic) (POA: Yes)    Severe obesity (BMI >= 40) (HCC) (POA: Yes)    TONYA (obstructive sleep apnea) (POA: Yes)    IIH (idiopathic intracranial hypertension) (POA: Yes)    Normal pressure hydrocephalus (HCC) (POA: Yes)    Moderate persistent asthma without complication (POA: Yes)    Postural orthostatic tachycardia syndrome (POA: Yes)  Resolved Problems:    * No resolved hospital problems. *      FOLLOW UP  Future Appointments   Date Time Provider Department Center   3/18/2024 12:40 PM John Leon M.D. [facility] None     Sotero Velasquez III, M.D.  [address]  [facility] [address]  [phone]    Go to  scheduled appointment for Neurosurgery consultation      MEDICATIONS ON DISCHARGE     Medication List        CONTINUE taking these medications        Instructions   adalimumab 80 MG/0.8ML injection  Commonly known as: Humira   Inject 80 mg under the skin every 14 days.  Dose: 80 mg     albuterol 2.5mg/3ml Nebu solution for nebulization  Commonly known as: Proventil   Take 3 mL by nebulization every four hours as needed for Shortness of Breath.  Dose: 2.5 mg     albuterol-ipratropium  MCG/ACT Aero  Commonly known as: Combivent   Inhale 2 Puffs every 6 hours as needed for Shortness of Breath. Use with spacer  Dose: 2 Puff     Corlanor 7.5 MG Tabs tablet  Generic drug: ivabradine   Take 1 Tablet by mouth 2 times a day with meals.  Dose: 7.5 mg     diphenhydrAMINE 50 MG tablet  Commonly known as: Benadryl   Take 25-50 mg by mouth 2 times a day. 25 mg in the morning, 50 mg in the evening  Dose: 25-50 mg     Emgality 120 MG/ML Soaj  Generic drug: Galcanezumab-gnlm   Inject 120 mg under the skin every 30 (thirty) days.  Dose: 120 mg     etonogestrel 68 MG Impl implant  Commonly known as: Nexplanon   Inject 1 Each under the skin see administration instructions. Pt reports she is not sure when she  had this medications injected last, pt reports she still has it in her arm  Every 3 years to change  Dose: 68 mg     fluconazole 150 MG tablet  Commonly known as: Diflucan   Take 150 mg by mouth every Wednesday.  Dose: 150 mg     lamoTRIgine 25 MG Tabs  Commonly known as: LaMICtal   Take 50 mg by mouth 2 times a day. 50 mg = 2 tablets  Dose: 50 mg     Melatonin 10 MG Tabs   Take 10 mg by mouth at bedtime.  Dose: 10 mg     metoprolol SR 25 MG Tb24  Commonly known as: Toprol XL   Take 1 Tablet by mouth every day.  Dose: 25 mg     omeprazole 20 MG delayed-release capsule  Commonly known as: PriLOSEC   Take 20 mg by mouth every day.  Dose: 20 mg     OXYGEN-HELIUM INH   2 to 4 LPM as needed via nasal cannula     predniSONE 20 MG Tabs  Commonly known as: Deltasone   Take 20 mg by mouth every day. 5 day course started 12/29/23  Dose: 20 mg     pregabalin 75 MG Caps  Commonly known as: Lyrica   Take 75 mg by mouth 2 times a day.  Dose: 75 mg     sodium chloride 1 GM Tabs  Commonly known as: Salt   Take 1 Tablet by mouth 3 times a day with meals.  Dose: 1 g     Stool Softener 250 MG capsule  Generic drug: docusate sodium   Take 250 mg by mouth 2 times a day.  Dose: 250 mg     tizanidine 4 MG Tabs  Commonly known as: Zanaflex   Take 4 mg by mouth 2 times a day.  Dose: 4 mg     topiramate 50 MG tablet  Commonly known as: Topamax   Take 50 mg by mouth 2 times a day.  Dose: 50 mg     ziprasidone 40 MG Caps  Commonly known as: Geodon   Take 1 capsule by mouth at bedtime.  Dose: 40 mg            STOP taking these medications      doxycycline 100 MG capsule  Commonly known as: Monodox              Allergies  Allergies   Allergen Reactions    Cefdinir Shortness of Breath and Itching     Tolerated 1/18/17  Tolerates ceftriaxone  Tolerated augmentin 8/2019     Depakote [Divalproex Sodium] Unspecified     Muscle spasms/muscle pain and weakness      Doxycycline Anaphylaxis and Vomiting     Other reaction(s): pustules/blisters  Other  "reaction(s): stomach pain    Montelukast [Singulair] Unspecified     Cardiac effusion    Vancomycin Itching     Pt becomes flushed in face and chest.   RXN=7/10/16    Wound Dressing Adhesive Rash     By pt report-\"removes skin totally off\"    Amitriptyline Unspecified     Headaches      Amoxicillin Rash          Aripiprazole Unspecified    Aripiprazole [Abilify] Unspecified     Headaches/muscle twitching      Clindamycin Nausea         Other reaction(s): nausea stomach pain    Depakote [Divalproex Sodium]     Erythromycin Rash     .  Other reaction(s): nausea stomach pain    Flomax [Tamsulosin Hydrochloride] Swelling    Hydromorphone      Other reaction(s): vomiting    Levaquin Unspecified     Severe muscle cramps in legs  RXN=unknown  Other reaction(s): leg muscle cramps    Metformin Unspecified     Increased lactic acid     Other reaction(s): itching and rash/nausea vomiting    Tamsulosin Swelling     Swelling of legs    Tape Rash     Tears skin off  coban with Tegaderm tape ok intermittently  RXN=ongoing    Ampicillin Rash     Pt reports that she received a rash     Ciprofloxacin Rash          Keflex Rash     Pt states she gets a rash with this medication  Tolerates ceftriaxone  Can take with Benadryl    Levofloxacin Unspecified     Leg muscle cramps    Metronidazole Rash     \"Vision problems\"  Other reaction(s): vision problems    Montelukast     Penicillins Hives     Can take with Benadryl    Sulfa Drugs Itching, Myalgia and Hives     Muscle pain and weakness  Other reaction(s): unknown    Valproic Acid Rash     .       DIET  Orders Placed This Encounter   Procedures    Diet Order Diet: Regular     Standing Status:   Standing     Number of Occurrences:   1     Order Specific Question:   Diet:     Answer:   Regular [1]       ACTIVITY  As tolerated.  Weight bearing as tolerated    CONSULTATIONS  None    PROCEDURES  None    LABORATORY  Lab Results   Component Value Date    SODIUM 141 01/16/2024    POTASSIUM 3.7 " 01/16/2024    CHLORIDE 110 01/16/2024    CO2 17 (L) 01/16/2024    GLUCOSE 143 (H) 01/16/2024    BUN 10 01/16/2024    CREATININE 0.79 01/16/2024    CREATININE 0.75 (L) 07/20/2010    GLOMRATE >59 07/20/2010        Lab Results   Component Value Date    WBC 6.2 01/16/2024    WBC 6.1 07/20/2010    HEMOGLOBIN 14.2 01/16/2024    HEMATOCRIT 41.2 01/16/2024    PLATELETCT 161 (L) 01/16/2024        Total time of the discharge process exceeds 46 minutes.

## 2024-01-16 NOTE — ED NOTES
Med rec complete per patient  Allergies reviewed.   Outpatient antibiotics in the last 30 days? Yes   Anticoagulants taken in the last 14 days? No    *Patient normally takes every Wednesday, was taking every 72 hours for infection the past few days.    Tyesha Coronado, CHENCHOhT

## 2024-01-16 NOTE — ED NOTES
Attempted to ambulate patient with cane, patient was unsteady on feet, rocking/shacking side to side and unable to take a step forward without loosing balance and falling. Patient placed back into [facility], [facility] notified.

## 2024-01-16 NOTE — DISCHARGE PLANNING
ER  Note:  Discussed during ED rounds. Per MD, plan for discharge home with home health, pending PT/ OT eval.    1015: RN SARA spoke to pt at bedside. Pt confirmed information on AVS. Pt reports she lives in a one story house (with basement) with her grandmother but she needs to provide care for herself. Pt reports she has a cane, 4WW, wheelchair, and uses home oxygen as needed for her asthma, on service with [employer]. Discussed discharge plan. Pt agreeable with HH, pending PT/OT evals. Received HH and DME choice. Choice forms faxed to [facility].     Addendum:  1043: Received Vo[employer]te from PT/OT, recommending HH, walker and bedside commode. Hospitalist updated. Per MD, wait for DME walker delivery before discharge; okay to discharge home pending home health and BSC. ED RN updated.  Pt has Medicaid MTM non emergency transportation benefit.    RN CM called [facility] for home delivery for BSC. Per chart, [facility] accepted pt. ED RN and Hospitalist updated. Pending FWW delivery.    RN CM spoke to pt at bedside, updated about [facility] acceptance and request for BSC to be delivered to home by [employer] once accepted. Pt verbalized understanding and denies other needs at this time. ED RN brought in walker. Per pt, she will call her friend to give her a ride home at discharge.          Care Transition Team Assessment      Information Source  Orientation Level: Oriented X4  Information Given By: Patient  Informant's Name: Kristin Balderrama Risk  Legal Hold: No    Interdisciplinary Discharge Planning  Primary Care Physician: RAGHU ALEX M.D.  Lives with - Patient's Self Care Capacity: Related Adult (grandmother)  Support Systems: Family Member(s)  Housing / Facility: 1 Story House  Mobility Issues: Yes  Assistance Needed: Unknown at this Time  Durable Medical Equipment: Walker, Home Oxygen, Other - Specify (O2 PRN, walker, wheelchair, cane)  DME Provider / Phone: Ephraim (O2)    Discharge  Preparedness  What is your plan after discharge?: Uncertain - pending medical team collaboration  What are your discharge supports?: Grandparent  Prior Functional Level: Uses Cane, Needs Assist with Activities of Daily Living, Independent with Medication Management    Functional Assesment  Prior Functional Level: Uses Cane, Needs Assist with Activities of Daily Living, Independent with Medication Management    Finances  Financial Barriers to Discharge: No  Prescription Coverage: Yes      Advance Directive  Advance Directive?: DPOA for Health Care  Durable Power of  Name and Contact : ANDREW BUSBY ([phone])    Domestic Abuse  Have you ever been the victim of abuse or violence?: No    Psychological Assessment  History of Substance Abuse: None  History of Psychiatric Problems: Yes    Discharge Risks or Barriers  Discharge risks or barriers?: Complex medical needs  Patient risk factors: Complex medical needs    Anticipated Discharge Information  Discharge Disposition: D/T to home under A care in anticipation of covered skilled care (06)  Discharge Address: [address] [place] [address] [place] [id number]  Discharge Contact Phone Number: [phone]

## 2024-01-16 NOTE — ED PROVIDER NOTES
ED Provider Note    CHIEF COMPLAINT  Chief Complaint   Patient presents with    Numbness     Off balance  Left leg tingling and loss of sensation        EXTERNAL RECORDS REVIEWED  Inpatient Notes neurology consult note on 12/10/2022 for functional neurologic disorder    HPI/ROS  LIMITATION TO HISTORY   Select: : None  OUTSIDE HISTORIAN(S):  EMS transferred the patient for inability to ambulate and off balance    Kristin Balderrama is a 33 y.o. female who presents with room spinning dizziness, left lower extremity dull sensation, incontinence worse than normal.  Patient has a bladder stimulator.  Patient denies urinary retention.  Patient endorses headache and history of migraines.  Patient denies vision changes.  Patient reports that she is having to hold the wall in order to walk.  Patient reports recent upper respiratory infection 1 week ago.     PAST MEDICAL HISTORY   has a past medical history of Abdominal pain, Anginal syndrome, Apnea, sleep, Arrhythmia, Arthritis, ASTHMA, Back pain, Borderline personality disorder ([facility]), Breath shortness, Bronchitis (02/08/2022), Cardiac arrhythmia, Chickenpox, Chronic UTI (09/18/2010), Cough, Daytime sleepiness, Dental disorder (03/08/2021), Depression, Diabetes ([facility]), Diarrhea, Disorder of thyroid, Fall, Fatigue, Frequent headaches, Gasping for breath, Gynecological disorder, Headache(784.0), Heart burn, Heart murmur, History of falling, Indigestion, Migraine, 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[facility]), 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    SURGICAL HISTORY   has a past surgical history that includes neuro dest facet l/s w/ig sngl (04/21/2015); recovery (01/27/2016); [place] by laparoscopy (08/29/2010); lumbar fusion anterior (08/21/2012); other cardiac surgery (01/2016); tonsillectomy; bowel stimulator insertion (07/13/2016); gastroscopy with balloon dilatation (N/A, 01/04/2017); muscle biopsy (Right, 01/26/2017); other abdominal surgery; laminotomy; bowel stimulator insertion (03/10/2021); lap,diagnostic abdomen (02/14/2022); ovarian cystectomy (Right, 02/14/2022); percut fix prox/neck femur fx (Left, 01/28/2023); inguinal hernia laparoscopic (Right, 07/21/2023); and hernia repair (Right, 07/21/2023).    FAMILY HISTORY  Family History   Problem Relation Age of Onset    Hypertension Mother     Sleep Apnea Mother     Heart Disease Mother     Other Mother         hypothryod    Hypertension Maternal Uncle     Heart Disease Maternal Grandmother     Hypertension Maternal Grandmother     No Known Problems Sister     Other Sister         Narcolepsy;fibromyalsia;bone;nerve    Genitourinary () Problems Sister         endometriosis       SOCIAL HISTORY  Social History     Tobacco Use    Smoking status: Never    Smokeless tobacco: Never   Vaping Use    Vaping Use: Never used   Substance and Sexual Activity    Alcohol use: No     Alcohol/week: 0.0 oz    Drug use: Never     Frequency: 7.0 times per week    Sexual activity: Not Currently     Birth control/protection: Implant       CURRENT MEDICATIONS  Home Medications    **Home medications have not yet been reviewed for this encounter**         ALLERGIES  Allergies   Allergen Reactions    Cefdinir Shortness of Breath and Itching     Tolerated 1/18/17  Tolerates ceftriaxone  Tolerated augmentin 8/2019     Depakote [Divalproex Sodium] Unspecified     Muscle spasms/muscle pain and weakness      Doxycycline Anaphylaxis and Vomiting     Other reaction(s): pustules/blisters  Other  "reaction(s): stomach pain    Montelukast [Singulair] Unspecified     Cardiac effusion    Vancomycin Itching     Pt becomes flushed in face and chest.   RXN=7/10/16    Wound Dressing Adhesive Rash     By pt report-\"removes skin totally off\"    Amitriptyline Unspecified     Headaches      Aripiprazole Unspecified    Aripiprazole [Abilify] Unspecified     Headaches/muscle twitching      Clindamycin Nausea         Other reaction(s): nausea stomach pain    Flomax [Tamsulosin Hydrochloride] Swelling    Levaquin Unspecified     Severe muscle cramps in legs  RXN=unknown  Other reaction(s): leg muscle cramps    Metformin Unspecified     Increased lactic acid     Other reaction(s): itching and rash/nausea vomiting    Tamsulosin Swelling     Swelling of legs    Tape Rash     Tears skin off  coban with Tegaderm tape ok intermittently  RXN=ongoing    Amoxicillin Rash    Depakote [Divalproex Sodium]     Erythromycin Rash     .  Other reaction(s): nausea stomach pain    Hydromorphone      Other reaction(s): vomiting    Montelukast     Ampicillin Rash     Pt reports that she received a rash     Ciprofloxacin Rash          Keflex Rash     Pt states she gets a rash with this medication  Tolerates ceftriaxone  Can take with Benadryl    Levofloxacin Unspecified     Leg muscle cramps    Metronidazole Rash     \"Vision problems\"  Other reaction(s): vision problems    Penicillins Hives     Can take with Benadryl    Sulfa Drugs Itching, Myalgia and Hives     Muscle pain and weakness  Other reaction(s): unknown    Valproic Acid Rash     .       PHYSICAL EXAM  VITAL SIGNS: /78   Pulse 99   Temp 37.2 °C (99 °F) (Temporal)   Resp 20   Ht 1.626 m (5' 4\")   Wt 113 kg (250 lb)   SpO2 93%   BMI 42.91 kg/m²    Vitals and nursing note reviewed.   Constitutional:       Comments: Patient is lying in bed supine, pleasant, conversant, speaking in complete sentences   HENT:      Head: Normocephalic and atraumatic.   Eyes:      Extraocular " Movements: Extraocular movements intact.      Conjunctiva/sclera: Conjunctivae normal.      Pupils: Pupils are equal, round, and reactive to light.   Cardiovascular:      Pulses: Normal pulses.      Comments: HR 82  Pulmonary:      Effort: Pulmonary effort is normal. No respiratory distress.   Abdominal:      Comments: Abdomen is soft, non-tender, non-distended, non-rigid, no rebound, guarding, masses, no McBurney's point tenderness, no peritoneal signs, negative Rovsing sign, negative Coon sign.  No CVA tenderness to palpation. Benign abdomen.   Musculoskeletal:         General: No swelling. Normal range of motion.      Cervical back: Normal range of motion. No rigidity.   Skin:     General: Skin is warm and dry.      Capillary Refill: Capillary refill takes less than 2 seconds.   Neurological:      Mental Status: Alert. CN II-XII intact, speech normal, motor strength 4/5 in bilateral lower extremities, 5 out of 5 in bilateral upper extremities, poor volition with exam, normal  strength bilaterally, sensation to light touch grossly intact in all extremities, no finger to nose dysmetria, no pronator drift, no nystagmus, AO x3        DIAGNOSTIC STUDIES / PROCEDURES    LABS  Bicarb low at 17    RADIOLOGY  I have independently interpreted the diagnostic imaging associated with this visit and am waiting the final reading from the radiologist.   My preliminary interpretation is as follows: No canal narrowing of the thoracic or lumbar spine  Radiologist interpretation: No canal narrowing of the lumbar thoracic spine    COURSE & MEDICAL DECISION MAKING    INITIAL ASSESSMENT, COURSE AND PLAN  Care Narrative: No evidence of focal neurologic deficits.  Patient does have a history of lumbar spine fusion, reporting new lower extremity symptoms, transferred for MRI, MRI of the T and L-spine pending.  CT head demonstrates no evidence of acute intracranial abnormality from outside hospital.  CBC to evaluate for acute anemia  and leukocytosis.  CMP to evaluate for acute electrolyte abnormality, acute kidney injury, acute liver failure or dysfunction.  Headache cocktail, IV fluids, Compazine, Benadryl, Toradol for symptom control.  No history of trauma.  Dexamethasone for possible vestibular neuritis.  Disposition pending workup.    Electronically signed by: Abhishek Frances M.D., 1/16/2024 12:32 AM    Lab work markable for low bicarb.  Otherwise CMP demonstrates no evidence of acute kidney injury, acute electrolyte abnormality, acute liver failure, CBC demonstrates no evidence of acute anemia or leukocytosis.  MRI results per Dr. Elizondo demonstrate no evidence of acute canal narrowing explain the patient's current symptoms.  Patient still unable to ambulate on reassessment.  I do believe this is likely more complex than a migraine headache.  Patient will likely require MRI imaging of the brain and possible neurology versus neurosurgery consultation due to history of hydrocephalus.      This dictation has been created using voice recognition software. I am continuously working with the software to minimize the number of voice recognition errors and I have made every attempt to manually correct the errors within my dictation. However errors  related to this voice recognition software may still exist and should be interpreted within the appropriate context.     Electronically signed by: Abhishek Frances M.D., 1/16/2024 4:43 AM        FINAL DIAGNOSIS  1. Cannot walk    2. Urinary incontinence, unspecified type    3. Paresthesias    4. Dehydration           Electronically signed by: Abhishek Frances M.D., 1/16/2024 12:28 AM

## 2024-01-17 PROBLEM — J45.40 MODERATE PERSISTENT ASTHMA WITHOUT COMPLICATION: Status: ACTIVE | Noted: 2023-01-27

## 2024-01-17 NOTE — DISCHARGE PLANNING
ER  Note:  Received call from Francia from [facility] that [facility] forwarded referral to them and they have accepted pt. Informed Francia that pt was discharged yesterday, was sent home with a walker, order for bedside commode from [employer] pending insurance approval, pt needs home health aide. Per Francia, she will relay to HH team.

## 2024-01-25 ENCOUNTER — HOSPITAL ENCOUNTER (OUTPATIENT)
Facility: MEDICAL CENTER | Age: 34
End: 2024-01-25
Attending: STUDENT IN AN ORGANIZED HEALTH CARE EDUCATION/TRAINING PROGRAM
Payer: MEDICARE

## 2024-01-25 DIAGNOSIS — G90.A POSTURAL ORTHOSTATIC TACHYCARDIA SYNDROME: ICD-10-CM

## 2024-01-25 PROCEDURE — 87186 SC STD MICRODIL/AGAR DIL: CPT

## 2024-01-25 PROCEDURE — 87086 URINE CULTURE/COLONY COUNT: CPT

## 2024-01-25 PROCEDURE — 87077 CULTURE AEROBIC IDENTIFY: CPT

## 2024-01-25 RX ORDER — SODIUM CHLORIDE 1 G/1
TABLET ORAL
Qty: 90 TABLET | Refills: 2 | Status: SHIPPED | OUTPATIENT
Start: 2024-01-25

## 2024-01-27 ENCOUNTER — HOSPITAL ENCOUNTER (EMERGENCY)
Facility: MEDICAL CENTER | Age: 34
End: 2024-01-27
Attending: EMERGENCY MEDICINE
Payer: MEDICARE

## 2024-01-27 VITALS
TEMPERATURE: 98 F | HEART RATE: 79 BPM | DIASTOLIC BLOOD PRESSURE: 83 MMHG | SYSTOLIC BLOOD PRESSURE: 154 MMHG | RESPIRATION RATE: 18 BRPM | WEIGHT: 250 LBS | OXYGEN SATURATION: 100 % | HEIGHT: 64 IN | BODY MASS INDEX: 42.68 KG/M2

## 2024-01-27 DIAGNOSIS — N12 PYELONEPHRITIS: ICD-10-CM

## 2024-01-27 LAB
ALBUMIN SERPL BCP-MCNC: 4.7 G/DL (ref 3.2–4.9)
ALBUMIN/GLOB SERPL: 1.9 G/DL
ALP SERPL-CCNC: 67 U/L (ref 30–99)
ALT SERPL-CCNC: 32 U/L (ref 2–50)
ANION GAP SERPL CALC-SCNC: 16 MMOL/L (ref 7–16)
APPEARANCE UR: CLEAR
AST SERPL-CCNC: 23 U/L (ref 12–45)
BACTERIA #/AREA URNS HPF: NEGATIVE /HPF
BACTERIA UR CULT: ABNORMAL
BACTERIA UR CULT: ABNORMAL
BASOPHILS # BLD AUTO: 0.9 % (ref 0–1.8)
BASOPHILS # BLD: 0.04 K/UL (ref 0–0.12)
BILIRUB SERPL-MCNC: 0.3 MG/DL (ref 0.1–1.5)
BILIRUB UR QL STRIP.AUTO: NEGATIVE
BUN SERPL-MCNC: 11 MG/DL (ref 8–22)
CALCIUM ALBUM COR SERPL-MCNC: 8.7 MG/DL (ref 8.5–10.5)
CALCIUM SERPL-MCNC: 9.3 MG/DL (ref 8.5–10.5)
CHLORIDE SERPL-SCNC: 109 MMOL/L (ref 96–112)
CO2 SERPL-SCNC: 16 MMOL/L (ref 20–33)
COLOR UR: YELLOW
CREAT SERPL-MCNC: 0.66 MG/DL (ref 0.5–1.4)
EOSINOPHIL # BLD AUTO: 0.13 K/UL (ref 0–0.51)
EOSINOPHIL NFR BLD: 2.8 % (ref 0–6.9)
EPI CELLS #/AREA URNS HPF: ABNORMAL /HPF
ERYTHROCYTE [DISTWIDTH] IN BLOOD BY AUTOMATED COUNT: 43 FL (ref 35.9–50)
GFR SERPLBLD CREATININE-BSD FMLA CKD-EPI: 118 ML/MIN/1.73 M 2
GLOBULIN SER CALC-MCNC: 2.5 G/DL (ref 1.9–3.5)
GLUCOSE SERPL-MCNC: 134 MG/DL (ref 65–99)
GLUCOSE UR STRIP.AUTO-MCNC: NEGATIVE MG/DL
HCG SERPL QL: NEGATIVE
HCT VFR BLD AUTO: 44.3 % (ref 37–47)
HGB BLD-MCNC: 15.6 G/DL (ref 12–16)
HYALINE CASTS #/AREA URNS LPF: ABNORMAL /LPF
IMM GRANULOCYTES # BLD AUTO: 0.03 K/UL (ref 0–0.11)
IMM GRANULOCYTES NFR BLD AUTO: 0.6 % (ref 0–0.9)
KETONES UR STRIP.AUTO-MCNC: NEGATIVE MG/DL
LEUKOCYTE ESTERASE UR QL STRIP.AUTO: ABNORMAL
LIPASE SERPL-CCNC: 34 U/L (ref 11–82)
LYMPHOCYTES # BLD AUTO: 1.78 K/UL (ref 1–4.8)
LYMPHOCYTES NFR BLD: 38.1 % (ref 22–41)
MCH RBC QN AUTO: 31.9 PG (ref 27–33)
MCHC RBC AUTO-ENTMCNC: 35.2 G/DL (ref 32.2–35.5)
MCV RBC AUTO: 90.6 FL (ref 81.4–97.8)
MICRO URNS: ABNORMAL
MONOCYTES # BLD AUTO: 0.43 K/UL (ref 0–0.85)
MONOCYTES NFR BLD AUTO: 9.2 % (ref 0–13.4)
NEUTROPHILS # BLD AUTO: 2.26 K/UL (ref 1.82–7.42)
NEUTROPHILS NFR BLD: 48.4 % (ref 44–72)
NITRITE UR QL STRIP.AUTO: NEGATIVE
NRBC # BLD AUTO: 0 K/UL
NRBC BLD-RTO: 0 /100 WBC (ref 0–0.2)
PH UR STRIP.AUTO: 7.5 [PH] (ref 5–8)
PLATELET # BLD AUTO: 192 K/UL (ref 164–446)
PMV BLD AUTO: 11.4 FL (ref 9–12.9)
POTASSIUM SERPL-SCNC: 4.1 MMOL/L (ref 3.6–5.5)
PROT SERPL-MCNC: 7.2 G/DL (ref 6–8.2)
PROT UR QL STRIP: NEGATIVE MG/DL
RBC # BLD AUTO: 4.89 M/UL (ref 4.2–5.4)
RBC # URNS HPF: ABNORMAL /HPF
RBC UR QL AUTO: NEGATIVE
SIGNIFICANT IND 70042: ABNORMAL
SITE SITE: ABNORMAL
SODIUM SERPL-SCNC: 141 MMOL/L (ref 135–145)
SOURCE SOURCE: ABNORMAL
SP GR UR STRIP.AUTO: 1.02
UROBILINOGEN UR STRIP.AUTO-MCNC: 1 MG/DL
WBC # BLD AUTO: 4.7 K/UL (ref 4.8–10.8)
WBC #/AREA URNS HPF: ABNORMAL /HPF

## 2024-01-27 PROCEDURE — 96375 TX/PRO/DX INJ NEW DRUG ADDON: CPT

## 2024-01-27 PROCEDURE — 85025 COMPLETE CBC W/AUTO DIFF WBC: CPT

## 2024-01-27 PROCEDURE — 700105 HCHG RX REV CODE 258: Mod: UD | Performed by: EMERGENCY MEDICINE

## 2024-01-27 PROCEDURE — 99285 EMERGENCY DEPT VISIT HI MDM: CPT

## 2024-01-27 PROCEDURE — 84703 CHORIONIC GONADOTROPIN ASSAY: CPT

## 2024-01-27 PROCEDURE — 83690 ASSAY OF LIPASE: CPT

## 2024-01-27 PROCEDURE — 96374 THER/PROPH/DIAG INJ IV PUSH: CPT

## 2024-01-27 PROCEDURE — 700111 HCHG RX REV CODE 636 W/ 250 OVERRIDE (IP): Mod: UD | Performed by: EMERGENCY MEDICINE

## 2024-01-27 PROCEDURE — 36415 COLL VENOUS BLD VENIPUNCTURE: CPT

## 2024-01-27 PROCEDURE — 80053 COMPREHEN METABOLIC PANEL: CPT

## 2024-01-27 PROCEDURE — 81001 URINALYSIS AUTO W/SCOPE: CPT

## 2024-01-27 RX ORDER — SODIUM CHLORIDE, SODIUM LACTATE, POTASSIUM CHLORIDE, CALCIUM CHLORIDE 600; 310; 30; 20 MG/100ML; MG/100ML; MG/100ML; MG/100ML
1000 INJECTION, SOLUTION INTRAVENOUS ONCE
Status: COMPLETED | OUTPATIENT
Start: 2024-01-27 | End: 2024-01-27

## 2024-01-27 RX ORDER — DIPHENHYDRAMINE HYDROCHLORIDE 50 MG/ML
12.5 INJECTION INTRAMUSCULAR; INTRAVENOUS ONCE
Status: COMPLETED | OUTPATIENT
Start: 2024-01-27 | End: 2024-01-27

## 2024-01-27 RX ORDER — MORPHINE SULFATE 4 MG/ML
4 INJECTION INTRAVENOUS ONCE
Status: COMPLETED | OUTPATIENT
Start: 2024-01-27 | End: 2024-01-27

## 2024-01-27 RX ORDER — CEFDINIR 300 MG/1
300 CAPSULE ORAL 2 TIMES DAILY
Qty: 14 CAPSULE | Refills: 0 | Status: ON HOLD | OUTPATIENT
Start: 2024-01-27 | End: 2024-02-12

## 2024-01-27 RX ORDER — HYDROCODONE BITARTRATE AND ACETAMINOPHEN 5; 325 MG/1; MG/1
1 TABLET ORAL EVERY 4 HOURS PRN
Qty: 10 TABLET | Refills: 0 | Status: SHIPPED | OUTPATIENT
Start: 2024-01-27 | End: 2024-01-30

## 2024-01-27 RX ORDER — CEFTRIAXONE 2 G/1
2000 INJECTION, POWDER, FOR SOLUTION INTRAMUSCULAR; INTRAVENOUS ONCE
Status: COMPLETED | OUTPATIENT
Start: 2024-01-27 | End: 2024-01-27

## 2024-01-27 RX ORDER — ONDANSETRON 2 MG/ML
4 INJECTION INTRAMUSCULAR; INTRAVENOUS ONCE
Status: COMPLETED | OUTPATIENT
Start: 2024-01-27 | End: 2024-01-27

## 2024-01-27 RX ADMIN — DIPHENHYDRAMINE HYDROCHLORIDE 12.5 MG: 50 INJECTION, SOLUTION INTRAMUSCULAR; INTRAVENOUS at 15:25

## 2024-01-27 RX ADMIN — SODIUM CHLORIDE, POTASSIUM CHLORIDE, SODIUM LACTATE AND CALCIUM CHLORIDE 1000 ML: 600; 310; 30; 20 INJECTION, SOLUTION INTRAVENOUS at 15:34

## 2024-01-27 RX ADMIN — MORPHINE SULFATE 4 MG: 4 INJECTION, SOLUTION INTRAMUSCULAR; INTRAVENOUS at 15:24

## 2024-01-27 RX ADMIN — ONDANSETRON 4 MG: 2 INJECTION INTRAMUSCULAR; INTRAVENOUS at 15:22

## 2024-01-27 RX ADMIN — CEFTRIAXONE SODIUM 2000 MG: 2 INJECTION, POWDER, FOR SOLUTION INTRAMUSCULAR; INTRAVENOUS at 15:31

## 2024-01-27 ASSESSMENT — FIBROSIS 4 INDEX: FIB4 SCORE: 0.63

## 2024-01-27 NOTE — ED PROVIDER NOTES
"ED Provider Note    Scribed for Joe Robertson M.D. by Brandi Michele. 1/27/2024  2:32 PM    Primary care provider: Torres Brody M.D.  Means of arrival: Wheelchair    CHIEF COMPLAINT  Chief Complaint   Patient presents with    Flank Pain     Right x1wk    Painful Urination     +urinary urgency    Nausea       LIMITATION TO HISTORY   None    HPI    Kristin Balderrama is a 33 y.o. female who presents to the Emergency Department for right flank pain onset 1 week ago. Patient reports she had a kidney infection as noted by her urine that was tested a few days ago by [facility]. She states she got her urine results back today and came to the ER. She reports her right flank pain is an 8/10. She states associated symptoms of nausea, abdominal pain, pain during urination flank pain, urinary incontinence, and shakiness, headache but denies cough, fevers, leg swelling, or chills. She denies any chance of being pregnant.    OUTSIDE HISTORIAN(S):  None    EXTERNAL RECORDS REVIEWED  Reviewed Hospitalist discharge summary from January 16th for numbness and being off balance. She has chronic pain, POTS syndrome, bladder incontinence, and has a sacral stimulator. She had a CT-Head and MRI of her spine that shows post operative changes. She had 19 ER visits last year.     Her urine culture from [facility] showed Proteus mirabilis and resistance to tetracycline and nitrofurantoin.     Patient has had 134 CT scans. Her most recent renal colic CT scan was in October 2022 that showed bilateral kidney stones.    PAST MEDICAL HISTORY  Past Medical History:   Diagnosis Date    Abdominal pain     Anginal syndrome     random chest pain especially with tachycardia    Apnea, sleep     Arrhythmia     \"sinus tachycardia\", cariologist, Dr. Kumar; ablation 2/2016    Arthritis     osteo    ASTHMA     when around smoke    Back pain     Borderline personality disorder (HCC)     Breath shortness     with tachycardia    Bronchitis 02/08/2022 " "   Last time was 12/21    Cardiac arrhythmia     Chickenpox     Chronic UTI 09/18/2010    Cough     Daytime sleepiness     Dental disorder 03/08/2021    infected tooth    Depression     Diabetes (HCC)     Diarrhea     incontinece    Disorder of thyroid     Hashimoto's    Fall     Fatigue     Frequent headaches     Gasping for breath     Gynecological disorder     PCOS    Headache(784.0)     Heart burn     Heart murmur     History of falling     Indigestion     Migraine     Mitochondrial disease (HCC)     Multiple personality disorder (HCC)     Nausea     Obesity     Other fatigue 06/29/2020    Pain 08/15/2012    back, 7/10    Painful joint     PCOS (polycystic ovarian syndrome)     Pneumonia 2012 12/2020    POTS (postural orthostatic tachycardia syndrome)     Psychosis (HCC)     Ringing in ears     Scoliosis     Shortness of breath     O2 as needed    Sinus tachycardia 10/31/2013    Sleep apnea     CPAP \"pulmonary doctor took me off mid year 2016\"    Snoring     Supraventricular tachycardia 4/10/2019    Tonsillitis     Transverse myelitis (HCC)     2/8/22: Per pt: not anymore    Tuberculosis     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    Urinary bladder disorder     Suprapubic cath. 2/8/22: Not anymore.     Urinary incontinence     Weakness     Wears glasses        FAMILY HISTORY  Family History   Problem Relation Age of Onset    Hypertension Mother     Sleep Apnea Mother     Heart Disease Mother     Other Mother         hypothryod    Hypertension Maternal Uncle     Heart Disease Maternal Grandmother     Hypertension Maternal Grandmother     No Known Problems Sister     Other Sister         Narcolepsy;fibromyalsia;bone;nerve    Genitourinary () Problems Sister         endometriosis       SOCIAL HISTORY  Social History     Tobacco Use    Smoking status: Never    Smokeless tobacco: Never   Vaping Use    Vaping Use: Never used   Substance Use Topics    Alcohol use: No     Alcohol/week: 0.0 oz    Drug use: Never "     Frequency: 7.0 times per week     Social History     Substance and Sexual Activity   Drug Use Never    Frequency: 7.0 times per week       SURGICAL HISTORY  Past Surgical History:   Procedure Laterality Date    INGUINAL HERNIA LAPAROSCOPIC Right 07/21/2023    Procedure: LAPAROSCOPIC RIGHT INGUINAL HERNIA REPAIR WITH MESH;  Surgeon: Joe Noyola M.D.;  Location: [facility];  Service: General    HERNIA REPAIR Right 07/21/2023    PB PERCUT FIX PBOX/NECK FEMUR FX Left 01/28/2023    Procedure: FIXATION, HIP, USING CANNULATED SCREW;  Surgeon: Noman Abdul M.D.;  Location: [facility];  Service: Orthopedics    CT LAP,DIAGNOSTIC ABDOMEN  02/14/2022    Procedure: LAPAROSCOPY;  Surgeon: Seamus Pisano M.D.;  Location: SURGERY SAME DAY [facility];  Service: Gynecology    OVARIAN CYSTECTOMY Right 02/14/2022    Procedure: EXCISION, CYST, OVARY;  Surgeon: Seamus Pisano M.D.;  Location: SURGERY SAME DAY [facility];  Service: Gynecology    BOWEL STIMULATOR INSERTION  03/10/2021    Procedure: INSERTION, ELECTRODE LEADS AND PULSE GENERATOR, NEUROSTIMULATOR, SACRAL - REMOVAL OF INTERSTIM WITH REPLACEMENT OF SACRAL NEUROMODULATION DEVICE;  Surgeon: Joe Noyola M.D.;  Location: [facility];  Service: General    MUSCLE BIOPSY Right 01/26/2017    Procedure: MUSCLE BIOPSY - THIGH;  Surgeon: Isidro Vigil M.D.;  Location: [facility];  Service:     GASTROSCOPY WITH BALLOON DILATATION N/A 01/04/2017    Procedure: GASTROSCOPY WITH DILATATION;  Surgeon: Torres Vargas M.D.;  Location: [facility];  Service:     BOWEL STIMULATOR INSERTION  07/13/2016    Procedure: BOWEL STIMULATOR INSERTION FOR PERMANENT INTERSTIM SACRAL IMPLANT;  Surgeon: Joe Noyola M.D.;  Location: SURGERY [facility];  Service:     RECOVERY  01/27/2016    Procedure: CATH LAB EP STUDY WITH SINUS NODE MODIFICATION ABHINAV;  Surgeon: [facility];  Location: SURGERY PRE-POST PROC UNIT [facility];  Service:      OTHER CARDIAC SURGERY  01/2016    cardiac ablation    NEURO DEST FACET L/S W/IG SNGL  04/21/2015    Performed by Reza Tabor at SUR[facility]ERY [facility]    LUMBAR FUSION ANTERIOR  08/21/2012    Performed by SUSIE SAWANT at SURGERY [facility]S    ALYSSA BY LAPAROSCOPY  08/29/2010    Performed by SHAYY JOHNS at SURGERY [facility] ORS    LAMINOTOMY      OTHER ABDOMINAL SURGERY      TONSILLECTOMY      tonsillectomy       CURRENT MEDICATIONS  No current facility-administered medications for this encounter.    Current Outpatient Medications:     sodium chloride (SALT) 1 GM Tab, TAKE 1 TABLET BY MOUTH THREE TIMES A DAY WITH MEALS ( NOT COVERED/INS ), Disp: 90 Tablet, Rfl: 2    predniSONE (DELTASONE) 20 MG Tab, Take 20 mg by mouth every day. 5 day course started 12/29/23, Disp: , Rfl:     omeprazole (PRILOSEC) 20 MG delayed-release capsule, Take 20 mg by mouth every day., Disp: , Rfl:     lamoTRIgine (LAMICTAL) 25 MG Tab, Take 50 mg by mouth 2 times a day. 50 mg = 2 tablets, Disp: , Rfl:     OXYGEN-HELIUM INH, 2 to 4 LPM as needed via nasal cannula, Disp: , Rfl:     albuterol-ipratropium (COMBIVENT)  MCG/ACT Aerosol, Inhale 2 Puffs every 6 hours as needed for Shortness of Breath. Use with spacer, Disp: 1 Inhaler, Rfl: 1    ivabradine (CORLANOR) 7.5 MG Tab tablet, Take 1 Tablet by mouth 2 times a day with meals., Disp: 60 Tablet, Rfl: 2    albuterol (PROVENTIL) 2.5mg/3ml Nebu Soln solution for nebulization, Take 3 mL by nebulization every four hours as needed for Shortness of Breath., Disp: 100 mL, Rfl: 3    pregabalin (LYRICA) 75 MG Cap, Take 75 mg by mouth 2 times a day., Disp: , Rfl:     Galcanezumab-gnlm (EMGALITY) 120 MG/ML Solution Auto-injector, Inject 120 mg under the skin every 30 (thirty) days., Disp: , Rfl:     fluconazole (DIFLUCAN) 150 MG tablet, Take 150 mg by mouth every Wednesday., Disp: , Rfl:     topiramate (TOPAMAX) 50 MG tablet, Take 50 mg by mouth 2 times a day., Disp: , Rfl:      "metoprolol SR (TOPROL XL) 25 MG TABLET SR 24 HR, Take 1 Tablet by mouth every day., Disp: 90 Tablet, Rfl: 3    docusate sodium (STOOL SOFTENER) 250 MG capsule, Take 250 mg by mouth 2 times a day., Disp: , Rfl:     Adalimumab 80 MG/0.8ML Pen-injector Kit, Inject 80 mg under the skin every 14 days., Disp: , Rfl:     tizanidine (ZANAFLEX) 4 MG Tab, Take 4 mg by mouth 2 times a day., Disp: , Rfl:     ziprasidone (GEODON) 40 MG Cap, Take 1 capsule by mouth at bedtime., Disp: 30 Capsule, Rfl: 2    diphenhydrAMINE (BENADRYL) 50 MG tablet, Take 25-50 mg by mouth 2 times a day. 25 mg in the morning, 50 mg in the evening, Disp: , Rfl:     Melatonin 10 MG Tab, Take 10 mg by mouth at bedtime., Disp: , Rfl:     etonogestrel (NEXPLANON) 68 MG Implant implant, Inject 1 Each under the skin see administration instructions. Pt reports she is not sure when she had this medications injected last, pt reports she still has it in her arm Every 3 years to change, Disp: , Rfl:     ALLERGIES  Allergies   Allergen Reactions    Cefdinir Shortness of Breath and Itching     Tolerated 1/18/17  Tolerates ceftriaxone  Tolerated augmentin 8/2019     Depakote [Divalproex Sodium] Unspecified     Muscle spasms/muscle pain and weakness      Doxycycline Anaphylaxis and Vomiting     Other reaction(s): pustules/blisters  Other reaction(s): stomach pain    Montelukast [Singulair] Unspecified     Cardiac effusion    Vancomycin Itching     Pt becomes flushed in face and chest.   RXN=7/10/16    Wound Dressing Adhesive Rash     By pt report-\"removes skin totally off\"    Amitriptyline Unspecified     Headaches      Amoxicillin Rash          Aripiprazole Unspecified    Aripiprazole [Abilify] Unspecified     Headaches/muscle twitching      Clindamycin Nausea         Other reaction(s): nausea stomach pain    Depakote [Divalproex Sodium]     Erythromycin Rash     .  Other reaction(s): nausea stomach pain    Flomax [Tamsulosin Hydrochloride] Swelling    Hydromorphone " "     Other reaction(s): vomiting    Levaquin Unspecified     Severe muscle cramps in legs  RXN=unknown  Other reaction(s): leg muscle cramps    Metformin Unspecified     Increased lactic acid     Other reaction(s): itching and rash/nausea vomiting    Tamsulosin Swelling     Swelling of legs    Tape Rash     Tears skin off  coban with Tegaderm tape ok intermittently  RXN=ongoing    Ampicillin Rash     Pt reports that she received a rash     Ciprofloxacin Rash          Keflex Rash     Pt states she gets a rash with this medication  Tolerates ceftriaxone  Can take with Benadryl    Levofloxacin Unspecified     Leg muscle cramps    Metronidazole Rash     \"Vision problems\"  Other reaction(s): vision problems    Montelukast     Penicillins Hives     Can take with Benadryl    Sulfa Drugs Itching, Myalgia and Hives     Muscle pain and weakness  Other reaction(s): unknown    Valproic Acid Rash     .       PHYSICAL EXAM  VITAL SIGNS: BP (!) 161/90   Pulse 90   Temp 36.9 °C (98.4 °F) (Oral)   Resp 18   Ht 1.626 m (5' 4\")   Wt 113 kg (250 lb)   SpO2 97%   BMI 42.91 kg/m²   Reviewed and hypertensive, afebrile  Constitutional: Well developed, Well nourished.  Well-appearing, appears to be in pain, elevated BMI  HENT: Normocephalic, atraumatic, bilateral external ears normal, No intraoral erythema, edema, exudate  Eyes: PERRLA, conjunctiva pink, no scleral icterus.   Cardiovascular: Regular rate and rhythm. No murmurs, rubs or gallops.  No dependent edema or calf tenderness  Respiratory: Lungs clear to auscultation bilaterally. No wheezes, rales, or rhonchi.  Abdominal:  Abdomen soft, Mild right sided abdominal tenderness, non distended. No rebound, or guarding.    Skin: No erythema, no rash. No wounds or bruising.  Genitourinary: No costovertebral angle tenderness. Moderate right flank tenderness.   Musculoskeletal: no deformities.   Neurologic: Alert & oriented x 3, cranial nerves 2-12 intact by passive exam.  No focal " deficit noted.  Psychiatric: Affect normal, Judgment normal, Mood normal.     LABS Ordered and Reviewed by Me:  Results for orders placed or performed during the hospital encounter of 01/27/24   CBC WITH DIFFERENTIAL   Result Value Ref Range    WBC 4.7 (L) 4.8 - 10.8 K/uL    RBC 4.89 4.20 - 5.40 M/uL    Hemoglobin 15.6 12.0 - 16.0 g/dL    Hematocrit 44.3 37.0 - 47.0 %    MCV 90.6 81.4 - 97.8 fL    MCH 31.9 27.0 - 33.0 pg    MCHC 35.2 32.2 - 35.5 g/dL    RDW 43.0 35.9 - 50.0 fL    Platelet Count 192 164 - 446 K/uL    MPV 11.4 9.0 - 12.9 fL    Neutrophils-Polys 48.40 44.00 - 72.00 %    Lymphocytes 38.10 22.00 - 41.00 %    Monocytes 9.20 0.00 - 13.40 %    Eosinophils 2.80 0.00 - 6.90 %    Basophils 0.90 0.00 - 1.80 %    Immature Granulocytes 0.60 0.00 - 0.90 %    Nucleated RBC 0.00 0.00 - 0.20 /100 WBC    Neutrophils (Absolute) 2.26 1.82 - 7.42 K/uL    Lymphs (Absolute) 1.78 1.00 - 4.80 K/uL    Monos (Absolute) 0.43 0.00 - 0.85 K/uL    Eos (Absolute) 0.13 0.00 - 0.51 K/uL    Baso (Absolute) 0.04 0.00 - 0.12 K/uL    Immature Granulocytes (abs) 0.03 0.00 - 0.11 K/uL    NRBC (Absolute) 0.00 K/uL   COMP METABOLIC PANEL   Result Value Ref Range    Sodium 141 135 - 145 mmol/L    Potassium 4.1 3.6 - 5.5 mmol/L    Chloride 109 96 - 112 mmol/L    Co2 16 (L) 20 - 33 mmol/L    Anion Gap 16.0 7.0 - 16.0    Glucose 134 (H) 65 - 99 mg/dL    Bun 11 8 - 22 mg/dL    Creatinine 0.66 0.50 - 1.40 mg/dL    Calcium 9.3 8.5 - 10.5 mg/dL    Correct Calcium 8.7 8.5 - 10.5 mg/dL    AST(SGOT) 23 12 - 45 U/L    ALT(SGPT) 32 2 - 50 U/L    Alkaline Phosphatase 67 30 - 99 U/L    Total Bilirubin 0.3 0.1 - 1.5 mg/dL    Albumin 4.7 3.2 - 4.9 g/dL    Total Protein 7.2 6.0 - 8.2 g/dL    Globulin 2.5 1.9 - 3.5 g/dL    A-G Ratio 1.9 g/dL   LIPASE   Result Value Ref Range    Lipase 34 11 - 82 U/L   HCG QUAL SERUM   Result Value Ref Range    Beta-Hcg Qualitative Serum Negative Negative   URINALYSIS,CULTURE IF INDICATED    Specimen: Urine   Result Value Ref  Range    Micro Urine Req Microscopic    ESTIMATED GFR   Result Value Ref Range    GFR (CKD-EPI) 118 >60 mL/min/1.73 m 2     *Note: Due to a large number of results and/or encounters for the requested time period, some results have not been displayed. A complete set of results can be found in Results Review.     Prior urine cultures reviewed and she has had no recent pathogen    ED COURSE:  2:32 PM - Patient seen and examined at bedside. Ordered for labs and medication. Patient verbalizes understanding and agreement to this plan of care.        INTERVENTIONS BY ME:  Medications   cefTRIAXone (Rocephin) injection 2,000 mg (has no administration in time range)   diphenhydrAMINE (Benadryl) injection 12.5 mg (has no administration in time range)   lactated ringers (LR) bolus (has no administration in time range)   morphine 4 MG/ML injection 4 mg (has no administration in time range)   ondansetron (Zofran) syringe/vial injection 4 mg (4 mg Intravenous Given 1/27/24 1522)       4:22 PM - On reassessment response to intervention, the patient feels improved. I discussed the patient's diagnostic study results. I discussed plan for discharge and follow up as outlined below. The patient is stable for discharge at this time and will return for any new or worsening symptoms. Patient verbalizes understanding and support with my plan for discharge.      4:27 - Patient has a Narcotic score of 270, so her risk is moderate.     4:35 - Consulted with pharmacy about patient's allergies. He confirmed the patient will be able to tolerate the discharge medications.     MEDICAL DECISION MAKING:  PROBLEMS EVALUATED THIS VISIT:    This patient presents with flank pain and appears to have pyelonephritis based on urinalysis.  She has known kidney stones but the pattern of pain is not like renal colic and she does not look sick enough based on absence of fever and leukocytosis and vital sign changes to suggest obstructive pyelonephritis.  She  has had to many prior CTs at 134 in our facility for me to be able to recommend imaging to look for obstruction.  Ultrasound considered but unlikely to .  She does not have significant sepsis.    RISK:  Moderate given need for prescription antibiotics and opiate analgesics     PLAN:  New Prescriptions    CEFDINIR (OMNICEF) 300 MG CAP    Take 1 Capsule by mouth 2 times a day.    HYDROCODONE-ACETAMINOPHEN (NORCO) 5-325 MG TAB PER TABLET    Take 1 Tablet by mouth every four hours as needed (mild pain) for up to 3 days.     Prescription monitoring queried and score to 70  Opiate risk tool utilized and patient moderate risk  Informed consent obtained for opiate analgesic  Indication opiate analgesic pyelonephritis pain and failure of over-the-counter analgesics    Start antibiotics tomorrow afternoon.  Take ibuprofen for fever and hydrocodone for more severe pain.      Pyelonephritis handout given    Return for uncontrolled vomiting, ill appearance or dizziness with standing.  See your doctor or return if not better in 2 days.     Followup:  Torres Brody M.D.  [address]  [facility] [id number] [phone]    Schedule an appointment as soon as possible for a visit in 3 days  As needed if not better      CONDITION: Stable.     FINAL IMPRESSION  1. Pyelonephritis       Brandi RODRIGUEZ (Scribe), am scribing for, and in the presence of, Joe Robertson M.D..    Electronically signed by: Brandi Michele (Scribe), 1/27/2024    Joe RODRIGUEZ M.D. personally performed the services described in this documentation, as scribed by Brandi Michele in my presence, and it is both accurate and complete.    The note accurately reflects work and decisions made by me.  Joe Robertson M.D.  1/27/2024  7:58 PM

## 2024-01-27 NOTE — ED TRIAGE NOTES
Chief Complaint   Patient presents with    Flank Pain     Right x1wk    Painful Urination     +urinary urgency    Nausea     Pt wheeled to triage with above complaints, pt said that she had +urine culture from her urologist.  She also complaints of shaking , afebrile at triage.

## 2024-01-28 NOTE — DISCHARGE INSTRUCTIONS
Pyelonephritis  (Urinary Tract Infection Involving the Kidney)    Start antibiotics tomorrow afternoon.  Take ibuprofen for fever and hydrocodone for more severe pain.  Return for uncontrolled vomiting, ill appearance or dizziness with standing.  See your doctor or return if not better in 2 days.       Pyelonephritis is an inflammation (soreness) of the kidney. It is generally caused by infection. It usually affects only one kidney. It may affect both. Usually these kidney infections have spread from the lower urinary tract. Because the urethra (the opening to the bladder) is much shorter in females than in males, urinary tract infections are much more common in females.    SYMPTOMS (what seems to be the problem)  Usually infections of the kidney have an abrupt onset of chills and fever. There is often an ache in the flank (the back near the lower ribs). There is often a generalized malaise. There may be nausea (feeling sick to your stomach) and vomiting. Some times there are symptoms (problems) of cystitis (bladder infection and inflammation). These symptoms often include urgency, increased frequency of urination, and burning with urination.    Pyelonephritis will usually respond to antibiotics (medications that kill bacteria). When this illness is recognized and treated promptly, complications are not common. Hospitalization may be required. If treated at home, take all the medicine given to you until finished. You may feel better in a few days, but TAKE ALL THE MEDICINE or the infection may not respond. It can become more difficult to treat. Response can generally be expected in 7 to 10 days.    Drink lots of fluid, 3 to 4 quarts a day. Cranberry juice is especially recommended, in addition to large amounts of water. Avoid caffeine, tea and carbonated beverages (Coke®, 7-Up®, etc.), because they tend to irritate the bladder. Males should avoid alcohol as this may irritate the prostate. Aspirin, ibuprofen (Advil®  or Motrin®) or acetaminophen (Tylenol®) may be used for temperature and/or discomfort. Only use these if your caregiver has not given medications that interfere.    TO PREVENT FURTHER INFECTIONS:  Empty the bladder often. Avoid holding urine for long periods of time.  After a bowel movement, women should cleanse front to back. Only use each tissue once.  Empty the bladder before and after sexual intercourse.    If you develop an increase of back pain, fever, nausea, vomiting, or your symptoms are no better in three (3) days, seek medical attention. Seek medical attention sooner if you are getting worse.    Document Released: 12/18/2006    [employer]® Patient Information ©2008 [employer].

## 2024-01-28 NOTE — ED NOTES
Pt discharged, all appropriate hospital equipment removed (IV, monitor, pulse ox, etc.). Pt left unit via wc to lobby. Personal belongings with pt when leaving unit. Pt given discharge instructions prior to leaving ER, including where to  prescriptions and when to follow-up if applicable; verbalizes understanding. Pt informed to return to ED if symptoms worsen/return or altered status develop. Copy of discharge instructions signed and turned into DC basket and copy sent with pt. F/u with PCP

## 2024-01-29 ENCOUNTER — APPOINTMENT (OUTPATIENT)
Dept: ADMISSIONS | Facility: MEDICAL CENTER | Age: 34
End: 2024-01-29
Attending: UROLOGY
Payer: MEDICARE

## 2024-01-30 ENCOUNTER — TELEPHONE (OUTPATIENT)
Dept: CARDIOLOGY | Facility: MEDICAL CENTER | Age: 34
End: 2024-01-30
Payer: MEDICARE

## 2024-01-30 NOTE — TELEPHONE ENCOUNTER
"Last OV: 8.3.2023  Proposed Surgery: Cystoscopy, with ureteroscopy, with lithotripsy, with insertion of ureteral stent  Surgery Date: 2.12.2024  Requesting Office Name: Amber King  Fax Number: [phone]  Preference of Location (default is surgery center unless specified by Cardiologist or [facility])  Prior Clearance Addressed: No      Anticoags/Antiplatelets: Other None  Anticoags/Antiplatelet managed by Cardiology? YES  -none  Outstanding Cardiac Imaging : No  Stent, Cardiac Devices, or Catheterization: No  Ablation, TAVR/Valve (including open heart), Cardioversion: No  Recent Cardiac Hospitalization: No            When: N/A  History (cardiac history):   Past Medical History:   Diagnosis Date    Abdominal pain     Anginal syndrome     random chest pain especially with tachycardia    Apnea, sleep     Arrhythmia     \"sinus tachycardia\", cariologist, Dr. Kumar; ablation 2/2016    Arthritis     osteo    ASTHMA     when around smoke    Back pain     Borderline personality disorder (HCC)     Breath shortness     with tachycardia    Bronchitis 02/08/2022    Last time was 12/21    Cardiac arrhythmia     Chickenpox     Chronic UTI 09/18/2010    Cough     Daytime sleepiness     Dental disorder 03/08/2021    infected tooth    Depression     Diabetes (HCC)     Diarrhea     incontinece    Disorder of thyroid     Hashimoto's    Fall     Fatigue     Frequent headaches     Gasping for breath     Gynecological disorder     PCOS    Headache(784.0)     Heart burn     Heart murmur     History of falling     Indigestion     Migraine     Mitochondrial disease (HCC)     Multiple personality disorder (HCC)     Nausea     Obesity     Other fatigue 06/29/2020    Pain 08/15/2012    back, 7/10    Painful joint     PCOS (polycystic ovarian syndrome)     Pneumonia 2012 12/2020    POTS (postural orthostatic tachycardia syndrome)     Psychosis (HCC)     Ringing in ears     Scoliosis     Shortness of breath     O2 as needed    Sinus tachycardia " "10/31/2013    Sleep apnea     CPAP \"pulmonary doctor took me off mid year 2016\"    Snoring     Supraventricular tachycardia 4/10/2019    Tonsillitis     Transverse myelitis (HCC)     2/8/22: Per pt: not anymore    Tuberculosis     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    Urinary bladder disorder     Suprapubic cath. 2/8/22: Not anymore.     Urinary incontinence     Weakness     Wears glasses              Surgical Clearance Letter Sent: YES   **Scan clearance request letter into [facility].**     "

## 2024-02-03 ENCOUNTER — OFFICE VISIT (OUTPATIENT)
Dept: URGENT CARE | Facility: CLINIC | Age: 34
End: 2024-02-03
Payer: MEDICARE

## 2024-02-03 ENCOUNTER — HOSPITAL ENCOUNTER (OUTPATIENT)
Facility: MEDICAL CENTER | Age: 34
End: 2024-02-03
Attending: PHYSICIAN ASSISTANT
Payer: MEDICARE

## 2024-02-03 VITALS
HEIGHT: 64 IN | RESPIRATION RATE: 18 BRPM | TEMPERATURE: 97.7 F | DIASTOLIC BLOOD PRESSURE: 60 MMHG | OXYGEN SATURATION: 98 % | WEIGHT: 249.12 LBS | SYSTOLIC BLOOD PRESSURE: 118 MMHG | BODY MASS INDEX: 42.53 KG/M2 | HEART RATE: 75 BPM

## 2024-02-03 DIAGNOSIS — N39.0 RECURRENT UTI: ICD-10-CM

## 2024-02-03 DIAGNOSIS — R10.9 RIGHT FLANK PAIN: ICD-10-CM

## 2024-02-03 DIAGNOSIS — R35.89 POLYURIA: ICD-10-CM

## 2024-02-03 LAB
APPEARANCE UR: CLEAR
BILIRUB UR STRIP-MCNC: NEGATIVE MG/DL
COLOR UR AUTO: YELLOW
GLUCOSE UR STRIP.AUTO-MCNC: NEGATIVE MG/DL
KETONES UR STRIP.AUTO-MCNC: NEGATIVE MG/DL
LEUKOCYTE ESTERASE UR QL STRIP.AUTO: NEGATIVE
NITRITE UR QL STRIP.AUTO: NEGATIVE
PH UR STRIP.AUTO: 5.5 [PH] (ref 5–8)
PROT UR QL STRIP: NEGATIVE MG/DL
RBC UR QL AUTO: NEGATIVE
SP GR UR STRIP.AUTO: 1.03
UROBILINOGEN UR STRIP-MCNC: NORMAL MG/DL

## 2024-02-03 PROCEDURE — 3074F SYST BP LT 130 MM HG: CPT | Performed by: PHYSICIAN ASSISTANT

## 2024-02-03 PROCEDURE — 81002 URINALYSIS NONAUTO W/O SCOPE: CPT | Performed by: PHYSICIAN ASSISTANT

## 2024-02-03 PROCEDURE — 87086 URINE CULTURE/COLONY COUNT: CPT

## 2024-02-03 PROCEDURE — 99214 OFFICE O/P EST MOD 30 MIN: CPT | Performed by: PHYSICIAN ASSISTANT

## 2024-02-03 PROCEDURE — 3078F DIAST BP <80 MM HG: CPT | Performed by: PHYSICIAN ASSISTANT

## 2024-02-03 ASSESSMENT — ENCOUNTER SYMPTOMS
CHILLS: 0
FLANK PAIN: 1
ABDOMINAL PAIN: 0
FEVER: 0
VOMITING: 0
NAUSEA: 0

## 2024-02-03 ASSESSMENT — FIBROSIS 4 INDEX: FIB4 SCORE: 0.72

## 2024-02-03 NOTE — PROGRESS NOTES
"Subjective:   Kristin Balderrama  is a 33 y.o. female who presents for Other (Kidney infection, right side)      Other  This is a new problem. Pertinent negatives include no abdominal pain, chills, fever, nausea or vomiting.   Patient presents urgent care noting exacerbation of long history of recurrent UTI symptoms.  She notes she is finishing a course of cefdinir for urinary tract infection.  She review of chart demonstrates last culture was positive for Proteus Mirabella's that was sensitive to cephalosporins, Bactrim and Levaquin but resistant to Macrobid.  Patient notes slight right-sided flank pain over the last few days.  Denies fevers chills nausea vomiting.  She did note polyuria and some dysuria prior to the above.  She does have follow-up with urology in about a week and a half for cystoscopy for recurrent UTI.  Patient notes in the past urinalysis has been normal but culture has been revealing of bacteria.  Patient does have a history of known kidney stone on right side.    Review of Systems   Constitutional:  Negative for chills and fever.   Gastrointestinal:  Negative for abdominal pain, nausea and vomiting.   Genitourinary:  Positive for dysuria, flank pain, frequency and urgency. Negative for hematuria.       Allergies   Allergen Reactions    Cefdinir Shortness of Breath and Itching     Tolerated 1/18/17  Tolerates ceftriaxone  Tolerated augmentin 8/2019     Depakote [Divalproex Sodium] Unspecified     Muscle spasms/muscle pain and weakness      Doxycycline Anaphylaxis and Vomiting     Other reaction(s): pustules/blisters  Other reaction(s): stomach pain    Montelukast [Singulair] Unspecified     Cardiac effusion    Vancomycin Itching     Pt becomes flushed in face and chest.   RXN=7/10/16    Wound Dressing Adhesive Rash     By pt report-\"removes skin totally off\"    Amitriptyline Unspecified     Headaches      Amoxicillin Rash          Aripiprazole Unspecified    Aripiprazole [Abilify] Unspecified " "    Headaches/muscle twitching      Clindamycin Nausea         Other reaction(s): nausea stomach pain    Depakote [Divalproex Sodium]     Erythromycin Rash     .  Other reaction(s): nausea stomach pain    Flomax [Tamsulosin Hydrochloride] Swelling    Hydromorphone      Other reaction(s): vomiting    Levaquin Unspecified     Severe muscle cramps in legs  RXN=unknown  Other reaction(s): leg muscle cramps    Metformin Unspecified     Increased lactic acid     Other reaction(s): itching and rash/nausea vomiting    Tamsulosin Swelling     Swelling of legs    Tape Rash     Tears skin off  coban with Tegaderm tape ok intermittently  RXN=ongoing    Ampicillin Rash     Pt reports that she received a rash     Ciprofloxacin Rash          Keflex Rash     Pt states she gets a rash with this medication  Tolerates ceftriaxone  Can take with Benadryl    Levofloxacin Unspecified     Leg muscle cramps    Metronidazole Rash     \"Vision problems\"  Other reaction(s): vision problems    Montelukast     Penicillins Hives     Can take with Benadryl    Sulfa Drugs Itching, Myalgia and Hives     Muscle pain and weakness  Other reaction(s): unknown    Valproic Acid Rash     .        Objective:   /60 (BP Location: Right arm, Patient Position: Sitting, BP Cuff Size: Adult)   Pulse 75   Temp 36.5 °C (97.7 °F) (Temporal)   Resp 18   Ht 1.626 m (5' 4\")   Wt 113 kg (249 lb 1.9 oz)   SpO2 98%   BMI 42.76 kg/m²     Physical Exam  Vitals and nursing note reviewed.   Constitutional:       General: She is not in acute distress.     Appearance: She is well-developed. She is not diaphoretic.   HENT:      Head: Normocephalic and atraumatic.      Right Ear: External ear normal.      Left Ear: External ear normal.      Nose: Nose normal.   Eyes:      General: Lids are normal. No scleral icterus.        Right eye: No discharge.         Left eye: No discharge.      Conjunctiva/sclera: Conjunctivae normal.   Pulmonary:      Effort: Pulmonary effort " is normal. No respiratory distress.   Abdominal:      Tenderness: There is right CVA tenderness (very mild). There is no left CVA tenderness.   Musculoskeletal:         General: Normal range of motion.      Cervical back: Neck supple.   Skin:     General: Skin is warm and dry.      Coloration: Skin is not pale.      Findings: No erythema.   Neurological:      Mental Status: She is alert and oriented to person, place, and time. She is not disoriented.   Psychiatric:         Speech: Speech normal.         Behavior: Behavior normal.     Rocephin 500 mg IM-tolerates well (despite allergies patient has done well with Rocephin in the past)    Assessment/Plan:   1. Polyuria  - POCT Urinalysis    2. Recurrent UTI  - cefTRIAXone (Rocephin) 500 mg in lidocaine (Xylocaine) 1 % 2 mL for IM use  - URINE CULTURE(NEW); Future    3. Right flank pain  - cefTRIAXone (Rocephin) 500 mg in lidocaine (Xylocaine) 1 % 2 mL for IM use    Based on normal urinalysis and patient's extensive history of allergies to antibiotics I will not change her urinary antibiotic from cefdinir at this time.  Will augment treatment with Rocephin in clinic which patient has tolerated well.  Urine culture is repeated and we will direct treatment accordingly.  Follow-up with urology  ER precautions with any worsening symptoms are reviewed with patient/caregiver and they do express understanding    I have worn an N95 mask, gloves and eye protection for the entire encounter with this patient.     Differential diagnosis, natural history, supportive care, and indications for immediate follow-up discussed.

## 2024-02-05 ENCOUNTER — HOSPITAL ENCOUNTER (OUTPATIENT)
Dept: LAB | Facility: MEDICAL CENTER | Age: 34
End: 2024-02-05
Attending: UROLOGY
Payer: MEDICARE

## 2024-02-05 LAB
ANION GAP SERPL CALC-SCNC: 15 MMOL/L (ref 7–16)
APPEARANCE UR: CLEAR
APTT PPP: 27.4 SEC (ref 24.7–36)
BACTERIA #/AREA URNS HPF: ABNORMAL /HPF
BACTERIA UR CULT: NORMAL
BASOPHILS # BLD AUTO: 1 % (ref 0–1.8)
BASOPHILS # BLD: 0.05 K/UL (ref 0–0.12)
BILIRUB UR QL STRIP.AUTO: NEGATIVE
BUN SERPL-MCNC: 10 MG/DL (ref 8–22)
CALCIUM SERPL-MCNC: 9.3 MG/DL (ref 8.5–10.5)
CHLORIDE SERPL-SCNC: 110 MMOL/L (ref 96–112)
CO2 SERPL-SCNC: 14 MMOL/L (ref 20–33)
COLOR UR: YELLOW
CREAT SERPL-MCNC: 0.73 MG/DL (ref 0.5–1.4)
EOSINOPHIL # BLD AUTO: 0.08 K/UL (ref 0–0.51)
EOSINOPHIL NFR BLD: 1.5 % (ref 0–6.9)
EPI CELLS #/AREA URNS HPF: ABNORMAL /HPF
ERYTHROCYTE [DISTWIDTH] IN BLOOD BY AUTOMATED COUNT: 42 FL (ref 35.9–50)
GFR SERPLBLD CREATININE-BSD FMLA CKD-EPI: 110 ML/MIN/1.73 M 2
GLUCOSE SERPL-MCNC: 106 MG/DL (ref 65–99)
GLUCOSE UR STRIP.AUTO-MCNC: NEGATIVE MG/DL
HCT VFR BLD AUTO: 40.7 % (ref 37–47)
HGB BLD-MCNC: 14.5 G/DL (ref 12–16)
HYALINE CASTS #/AREA URNS LPF: ABNORMAL /LPF
IMM GRANULOCYTES # BLD AUTO: 0.01 K/UL (ref 0–0.11)
IMM GRANULOCYTES NFR BLD AUTO: 0.2 % (ref 0–0.9)
INR PPP: 0.97 (ref 0.87–1.13)
KETONES UR STRIP.AUTO-MCNC: NEGATIVE MG/DL
LEUKOCYTE ESTERASE UR QL STRIP.AUTO: ABNORMAL
LYMPHOCYTES # BLD AUTO: 2.08 K/UL (ref 1–4.8)
LYMPHOCYTES NFR BLD: 39.8 % (ref 22–41)
MCH RBC QN AUTO: 31.9 PG (ref 27–33)
MCHC RBC AUTO-ENTMCNC: 35.6 G/DL (ref 32.2–35.5)
MCV RBC AUTO: 89.5 FL (ref 81.4–97.8)
MICRO URNS: ABNORMAL
MONOCYTES # BLD AUTO: 0.49 K/UL (ref 0–0.85)
MONOCYTES NFR BLD AUTO: 9.4 % (ref 0–13.4)
MUCOUS THREADS #/AREA URNS HPF: ABNORMAL /HPF
NEUTROPHILS # BLD AUTO: 2.51 K/UL (ref 1.82–7.42)
NEUTROPHILS NFR BLD: 48.1 % (ref 44–72)
NITRITE UR QL STRIP.AUTO: NEGATIVE
NRBC # BLD AUTO: 0 K/UL
NRBC BLD-RTO: 0 /100 WBC (ref 0–0.2)
PH UR STRIP.AUTO: 5.5 [PH] (ref 5–8)
PLATELET # BLD AUTO: 199 K/UL (ref 164–446)
PMV BLD AUTO: 11.8 FL (ref 9–12.9)
POTASSIUM SERPL-SCNC: 4 MMOL/L (ref 3.6–5.5)
PROT UR QL STRIP: NEGATIVE MG/DL
PROTHROMBIN TIME: 13 SEC (ref 12–14.6)
RBC # BLD AUTO: 4.55 M/UL (ref 4.2–5.4)
RBC # URNS HPF: ABNORMAL /HPF
RBC UR QL AUTO: NEGATIVE
SIGNIFICANT IND 70042: NORMAL
SITE SITE: NORMAL
SODIUM SERPL-SCNC: 139 MMOL/L (ref 135–145)
SOURCE SOURCE: NORMAL
SP GR UR STRIP.AUTO: 1.02
UROBILINOGEN UR STRIP.AUTO-MCNC: 0.2 MG/DL
WBC # BLD AUTO: 5.2 K/UL (ref 4.8–10.8)
WBC #/AREA URNS HPF: ABNORMAL /HPF

## 2024-02-05 PROCEDURE — 87086 URINE CULTURE/COLONY COUNT: CPT

## 2024-02-05 PROCEDURE — 85610 PROTHROMBIN TIME: CPT

## 2024-02-05 PROCEDURE — 80048 BASIC METABOLIC PNL TOTAL CA: CPT

## 2024-02-05 PROCEDURE — 81001 URINALYSIS AUTO W/SCOPE: CPT

## 2024-02-05 PROCEDURE — 85730 THROMBOPLASTIN TIME PARTIAL: CPT

## 2024-02-05 PROCEDURE — 85025 COMPLETE CBC W/AUTO DIFF WBC: CPT

## 2024-02-05 PROCEDURE — 36415 COLL VENOUS BLD VENIPUNCTURE: CPT

## 2024-02-07 ENCOUNTER — PRE-ADMISSION TESTING (OUTPATIENT)
Dept: ADMISSIONS | Facility: MEDICAL CENTER | Age: 34
End: 2024-02-07
Attending: UROLOGY
Payer: MEDICARE

## 2024-02-07 LAB
BACTERIA UR CULT: NORMAL
SIGNIFICANT IND 70042: NORMAL
SITE SITE: NORMAL
SOURCE SOURCE: NORMAL

## 2024-02-07 RX ORDER — PSEUDOEPHEDRINE HCL 30 MG
250 TABLET ORAL
COMMUNITY

## 2024-02-07 RX ORDER — ONDANSETRON 4 MG/1
4 TABLET, ORALLY DISINTEGRATING ORAL EVERY 8 HOURS PRN
Status: ON HOLD | COMMUNITY
Start: 2023-12-31 | End: 2024-02-22

## 2024-02-07 RX ORDER — IVABRADINE 7.5 MG/1
1 TABLET, FILM COATED ORAL 2 TIMES DAILY WITH MEALS
COMMUNITY
End: 2024-02-07

## 2024-02-08 ENCOUNTER — APPOINTMENT (OUTPATIENT)
Dept: RADIOLOGY | Facility: MEDICAL CENTER | Age: 34
End: 2024-02-08
Attending: PHYSICIAN ASSISTANT
Payer: MEDICARE

## 2024-02-08 DIAGNOSIS — M54.50 LOW BACK PAIN, UNSPECIFIED BACK PAIN LATERALITY, UNSPECIFIED CHRONICITY, UNSPECIFIED WHETHER SCIATICA PRESENT: ICD-10-CM

## 2024-02-08 PROCEDURE — 72110 X-RAY EXAM L-2 SPINE 4/>VWS: CPT

## 2024-02-09 ENCOUNTER — ANESTHESIA EVENT (OUTPATIENT)
Dept: SURGERY | Facility: MEDICAL CENTER | Age: 34
End: 2024-02-09
Payer: MEDICARE

## 2024-02-12 ENCOUNTER — HOSPITAL ENCOUNTER (OUTPATIENT)
Facility: MEDICAL CENTER | Age: 34
End: 2024-02-12
Attending: UROLOGY | Admitting: UROLOGY
Payer: MEDICARE

## 2024-02-12 ENCOUNTER — APPOINTMENT (OUTPATIENT)
Dept: RADIOLOGY | Facility: MEDICAL CENTER | Age: 34
End: 2024-02-12
Attending: UROLOGY
Payer: MEDICARE

## 2024-02-12 ENCOUNTER — ANESTHESIA (OUTPATIENT)
Dept: SURGERY | Facility: MEDICAL CENTER | Age: 34
End: 2024-02-12
Payer: MEDICARE

## 2024-02-12 VITALS
TEMPERATURE: 96.4 F | DIASTOLIC BLOOD PRESSURE: 86 MMHG | HEIGHT: 64 IN | RESPIRATION RATE: 18 BRPM | BODY MASS INDEX: 44.75 KG/M2 | HEART RATE: 66 BPM | WEIGHT: 262.13 LBS | OXYGEN SATURATION: 97 % | SYSTOLIC BLOOD PRESSURE: 140 MMHG

## 2024-02-12 DIAGNOSIS — N20.0 KIDNEY STONE: ICD-10-CM

## 2024-02-12 LAB
GLUCOSE BLD STRIP.AUTO-MCNC: 102 MG/DL (ref 65–99)
GLUCOSE BLD STRIP.AUTO-MCNC: 103 MG/DL (ref 65–99)
HCG UR QL: NEGATIVE

## 2024-02-12 PROCEDURE — 700102 HCHG RX REV CODE 250 W/ 637 OVERRIDE(OP): Mod: UD | Performed by: ANESTHESIOLOGY

## 2024-02-12 PROCEDURE — 110371 HCHG SHELL REV 272: Performed by: UROLOGY

## 2024-02-12 PROCEDURE — 700105 HCHG RX REV CODE 258: Mod: UD | Performed by: ANESTHESIOLOGY

## 2024-02-12 PROCEDURE — 160046 HCHG PACU - 1ST 60 MINS PHASE II: Performed by: UROLOGY

## 2024-02-12 PROCEDURE — C1769 GUIDE WIRE: HCPCS | Performed by: UROLOGY

## 2024-02-12 PROCEDURE — 81025 URINE PREGNANCY TEST: CPT

## 2024-02-12 PROCEDURE — 160048 HCHG OR STATISTICAL LEVEL 1-5: Performed by: UROLOGY

## 2024-02-12 PROCEDURE — 160039 HCHG SURGERY MINUTES - EA ADDL 1 MIN LEVEL 3: Performed by: UROLOGY

## 2024-02-12 PROCEDURE — 82962 GLUCOSE BLOOD TEST: CPT

## 2024-02-12 PROCEDURE — 160036 HCHG PACU - EA ADDL 30 MINS PHASE I: Performed by: UROLOGY

## 2024-02-12 PROCEDURE — 160035 HCHG PACU - 1ST 60 MINS PHASE I: Performed by: UROLOGY

## 2024-02-12 PROCEDURE — 700105 HCHG RX REV CODE 258: Mod: UD | Performed by: UROLOGY

## 2024-02-12 PROCEDURE — 700111 HCHG RX REV CODE 636 W/ 250 OVERRIDE (IP): Mod: JZ,UD | Performed by: ANESTHESIOLOGY

## 2024-02-12 PROCEDURE — A9270 NON-COVERED ITEM OR SERVICE: HCPCS | Mod: UD | Performed by: ANESTHESIOLOGY

## 2024-02-12 PROCEDURE — C1747 HCHG SHELL REV 278 C1747: HCPCS | Performed by: UROLOGY

## 2024-02-12 PROCEDURE — C2617 STENT, NON-COR, TEM W/O DEL: HCPCS | Performed by: UROLOGY

## 2024-02-12 PROCEDURE — 160028 HCHG SURGERY MINUTES - 1ST 30 MINS LEVEL 3: Performed by: UROLOGY

## 2024-02-12 PROCEDURE — C1894 INTRO/SHEATH, NON-LASER: HCPCS | Performed by: UROLOGY

## 2024-02-12 PROCEDURE — 160025 RECOVERY II MINUTES (STATS): Performed by: UROLOGY

## 2024-02-12 PROCEDURE — 160002 HCHG RECOVERY MINUTES (STAT): Performed by: UROLOGY

## 2024-02-12 PROCEDURE — 160009 HCHG ANES TIME/MIN: Performed by: UROLOGY

## 2024-02-12 PROCEDURE — 700101 HCHG RX REV CODE 250: Mod: UD | Performed by: ANESTHESIOLOGY

## 2024-02-12 DEVICE — STENT UROLOGICAL POLARIS 6X24  ULTRA: Type: IMPLANTABLE DEVICE | Site: URETER | Status: FUNCTIONAL

## 2024-02-12 RX ORDER — LIDOCAINE HYDROCHLORIDE 40 MG/ML
SOLUTION TOPICAL PRN
Status: DISCONTINUED | OUTPATIENT
Start: 2024-02-12 | End: 2024-02-12 | Stop reason: SURG

## 2024-02-12 RX ORDER — LABETALOL HYDROCHLORIDE 5 MG/ML
5 INJECTION, SOLUTION INTRAVENOUS
Status: DISCONTINUED | OUTPATIENT
Start: 2024-02-12 | End: 2024-02-12 | Stop reason: HOSPADM

## 2024-02-12 RX ORDER — DEXAMETHASONE SODIUM PHOSPHATE 4 MG/ML
INJECTION, SOLUTION INTRA-ARTICULAR; INTRALESIONAL; INTRAMUSCULAR; INTRAVENOUS; SOFT TISSUE PRN
Status: DISCONTINUED | OUTPATIENT
Start: 2024-02-12 | End: 2024-02-12 | Stop reason: SURG

## 2024-02-12 RX ORDER — ONDANSETRON 2 MG/ML
INJECTION INTRAMUSCULAR; INTRAVENOUS PRN
Status: DISCONTINUED | OUTPATIENT
Start: 2024-02-12 | End: 2024-02-12 | Stop reason: SURG

## 2024-02-12 RX ORDER — DIPHENHYDRAMINE HYDROCHLORIDE 50 MG/ML
12.5 INJECTION INTRAMUSCULAR; INTRAVENOUS
Status: DISCONTINUED | OUTPATIENT
Start: 2024-02-12 | End: 2024-02-12 | Stop reason: HOSPADM

## 2024-02-12 RX ORDER — TRAMADOL HYDROCHLORIDE 50 MG/1
50 TABLET ORAL EVERY 4 HOURS PRN
Qty: 12 TABLET | Refills: 0 | Status: SHIPPED | OUTPATIENT
Start: 2024-02-12 | End: 2024-02-15

## 2024-02-12 RX ORDER — LIDOCAINE HYDROCHLORIDE 20 MG/ML
INJECTION, SOLUTION EPIDURAL; INFILTRATION; INTRACAUDAL; PERINEURAL PRN
Status: DISCONTINUED | OUTPATIENT
Start: 2024-02-12 | End: 2024-02-12 | Stop reason: SURG

## 2024-02-12 RX ORDER — SODIUM CHLORIDE, SODIUM LACTATE, POTASSIUM CHLORIDE, CALCIUM CHLORIDE 600; 310; 30; 20 MG/100ML; MG/100ML; MG/100ML; MG/100ML
INJECTION, SOLUTION INTRAVENOUS CONTINUOUS
Status: DISCONTINUED | OUTPATIENT
Start: 2024-02-12 | End: 2024-02-12 | Stop reason: HOSPADM

## 2024-02-12 RX ORDER — GENTAMICIN SULFATE 40 MG/ML
INJECTION, SOLUTION INTRAMUSCULAR; INTRAVENOUS PRN
Status: DISCONTINUED | OUTPATIENT
Start: 2024-02-12 | End: 2024-02-12 | Stop reason: SURG

## 2024-02-12 RX ORDER — OXYCODONE HCL 5 MG/5 ML
5 SOLUTION, ORAL ORAL
Status: COMPLETED | OUTPATIENT
Start: 2024-02-12 | End: 2024-02-12

## 2024-02-12 RX ORDER — EPHEDRINE SULFATE 50 MG/ML
5 INJECTION, SOLUTION INTRAVENOUS
Status: DISCONTINUED | OUTPATIENT
Start: 2024-02-12 | End: 2024-02-12 | Stop reason: HOSPADM

## 2024-02-12 RX ORDER — HYDRALAZINE HYDROCHLORIDE 20 MG/ML
5 INJECTION INTRAMUSCULAR; INTRAVENOUS
Status: DISCONTINUED | OUTPATIENT
Start: 2024-02-12 | End: 2024-02-12 | Stop reason: HOSPADM

## 2024-02-12 RX ORDER — ONDANSETRON 2 MG/ML
4 INJECTION INTRAMUSCULAR; INTRAVENOUS
Status: DISCONTINUED | OUTPATIENT
Start: 2024-02-12 | End: 2024-02-12 | Stop reason: HOSPADM

## 2024-02-12 RX ORDER — SODIUM CHLORIDE, SODIUM LACTATE, POTASSIUM CHLORIDE, CALCIUM CHLORIDE 600; 310; 30; 20 MG/100ML; MG/100ML; MG/100ML; MG/100ML
INJECTION, SOLUTION INTRAVENOUS
Status: DISCONTINUED | OUTPATIENT
Start: 2024-02-12 | End: 2024-02-12 | Stop reason: SURG

## 2024-02-12 RX ORDER — OXYCODONE HCL 5 MG/5 ML
10 SOLUTION, ORAL ORAL
Status: COMPLETED | OUTPATIENT
Start: 2024-02-12 | End: 2024-02-12

## 2024-02-12 RX ORDER — MIDAZOLAM HYDROCHLORIDE 1 MG/ML
INJECTION INTRAMUSCULAR; INTRAVENOUS PRN
Status: DISCONTINUED | OUTPATIENT
Start: 2024-02-12 | End: 2024-02-12 | Stop reason: SURG

## 2024-02-12 RX ORDER — MEPERIDINE HYDROCHLORIDE 25 MG/ML
12.5 INJECTION INTRAMUSCULAR; INTRAVENOUS; SUBCUTANEOUS
Status: DISCONTINUED | OUTPATIENT
Start: 2024-02-12 | End: 2024-02-12 | Stop reason: HOSPADM

## 2024-02-12 RX ORDER — MIDAZOLAM HYDROCHLORIDE 1 MG/ML
1 INJECTION INTRAMUSCULAR; INTRAVENOUS
Status: DISCONTINUED | OUTPATIENT
Start: 2024-02-12 | End: 2024-02-12 | Stop reason: HOSPADM

## 2024-02-12 RX ADMIN — PROPOFOL 200 MG: 10 INJECTION, EMULSION INTRAVENOUS at 11:34

## 2024-02-12 RX ADMIN — SODIUM CHLORIDE, POTASSIUM CHLORIDE, SODIUM LACTATE AND CALCIUM CHLORIDE: 600; 310; 30; 20 INJECTION, SOLUTION INTRAVENOUS at 11:20

## 2024-02-12 RX ADMIN — PROPOFOL 50 MG: 10 INJECTION, EMULSION INTRAVENOUS at 12:13

## 2024-02-12 RX ADMIN — SUGAMMADEX 200 MG: 100 INJECTION, SOLUTION INTRAVENOUS at 12:13

## 2024-02-12 RX ADMIN — LIDOCAINE HYDROCHLORIDE 3 ML: 40 SOLUTION TOPICAL at 11:34

## 2024-02-12 RX ADMIN — MORPHINE SULFATE 5 MG: 10 INJECTION INTRAVENOUS at 13:12

## 2024-02-12 RX ADMIN — FENTANYL CITRATE 50 MCG: 50 INJECTION, SOLUTION INTRAMUSCULAR; INTRAVENOUS at 12:39

## 2024-02-12 RX ADMIN — ROCURONIUM BROMIDE 50 MG: 10 INJECTION, SOLUTION INTRAVENOUS at 11:34

## 2024-02-12 RX ADMIN — SODIUM CHLORIDE, POTASSIUM CHLORIDE, SODIUM LACTATE AND CALCIUM CHLORIDE: 600; 310; 30; 20 INJECTION, SOLUTION INTRAVENOUS at 11:31

## 2024-02-12 RX ADMIN — OXYCODONE HYDROCHLORIDE 10 MG: 5 SOLUTION ORAL at 12:31

## 2024-02-12 RX ADMIN — MIDAZOLAM HYDROCHLORIDE 2 MG: 1 INJECTION, SOLUTION INTRAMUSCULAR; INTRAVENOUS at 11:34

## 2024-02-12 RX ADMIN — MORPHINE SULFATE 5 MG: 10 INJECTION INTRAVENOUS at 12:41

## 2024-02-12 RX ADMIN — FENTANYL CITRATE 50 MCG: 50 INJECTION, SOLUTION INTRAMUSCULAR; INTRAVENOUS at 12:13

## 2024-02-12 RX ADMIN — DEXAMETHASONE SODIUM PHOSPHATE 4 MG: 4 INJECTION INTRA-ARTICULAR; INTRALESIONAL; INTRAMUSCULAR; INTRAVENOUS; SOFT TISSUE at 11:44

## 2024-02-12 RX ADMIN — FENTANYL CITRATE 100 MCG: 50 INJECTION, SOLUTION INTRAMUSCULAR; INTRAVENOUS at 11:34

## 2024-02-12 RX ADMIN — LIDOCAINE HYDROCHLORIDE 50 MG: 20 INJECTION, SOLUTION EPIDURAL; INFILTRATION; INTRACAUDAL at 11:34

## 2024-02-12 RX ADMIN — GENTAMICIN SULFATE 320 MG: 40 INJECTION, SOLUTION INTRAMUSCULAR; INTRAVENOUS at 11:42

## 2024-02-12 RX ADMIN — MORPHINE SULFATE 5 MG: 10 INJECTION INTRAVENOUS at 12:57

## 2024-02-12 RX ADMIN — FENTANYL CITRATE 50 MCG: 50 INJECTION, SOLUTION INTRAMUSCULAR; INTRAVENOUS at 12:31

## 2024-02-12 RX ADMIN — ONDANSETRON 4 MG: 2 INJECTION INTRAMUSCULAR; INTRAVENOUS at 12:13

## 2024-02-12 ASSESSMENT — PAIN DESCRIPTION - PAIN TYPE
TYPE: ACUTE PAIN
TYPE: CHRONIC PAIN
TYPE: ACUTE PAIN

## 2024-02-12 ASSESSMENT — FIBROSIS 4 INDEX: FIB4 SCORE: 0.69

## 2024-02-12 NOTE — ANESTHESIA TIME REPORT
Anesthesia Start and Stop Event Times       Date Time Event    2/12/2024 1116 Ready for Procedure     1131 Anesthesia Start     1226 Anesthesia Stop          Responsible Staff  02/12/24      Name Role Begin End    Patrick Christy M.D. Anesth 1131 1226          Overtime Reason:  no overtime (within assigned shift)    Comments:

## 2024-02-12 NOTE — DISCHARGE INSTRUCTIONS
HOME CARE INSTRUCTIONS    ACTIVITY: Rest and take it easy for the first 24 hours.  A responsible adult is recommended to remain with you during that time.  It is normal to feel sleepy.  We encourage you to not do anything that requires balance, judgment or coordination.    FOR 24 HOURS DO NOT:  Drive, operate machinery or run household appliances.  Drink beer or alcoholic beverages.  Make important decisions or sign legal documents.    SPECIAL INSTRUCTIONS: see above    DIET: To avoid nausea, slowly advance diet as tolerated, avoiding spicy or greasy foods for the first day.  Add more substantial food to your diet according to your physician's instructions.  Babies can be fed formula or breast milk as soon as they are hungry.  INCREASE FLUIDS AND FIBER TO AVOID CONSTIPATION.    MEDICATIONS: Resume taking daily medication.  Take prescribed pain medication with food.  If no medication is prescribed, you may take non-aspirin pain medication if needed.  PAIN MEDICATION CAN BE VERY CONSTIPATING.  Take a stool softener or laxative such as senokot, pericolace, or milk of magnesia if needed.    Prescription given for tramadol.  Last pain medication given - _10 mg of Roxicodone at 1230pm___.    A follow-up appointment should be arranged with your doctor in 1-2 weeks; call to schedule.    You should CALL YOUR PHYSICIAN if you develop:  Fever greater than 101 degrees F.  Pain not relieved by medication, or persistent nausea or vomiting.  Excessive bleeding (blood soaking through dressing) or unexpected drainage from the wound.  Extreme redness or swelling around the incision site, drainage of pus or foul smelling drainage.  Inability to urinate or empty your bladder within 8 hours.  Problems with breathing or chest pain.    You should call 911 if you develop problems with breathing or chest pain.  If you are unable to contact your doctor or surgical center, you should go to the nearest emergency room or urgent care center.   Physician's telephone #: [phone]    MILD FLU-LIKE SYMPTOMS ARE NORMAL.  YOU MAY EXPERIENCE GENERALIZED MUSCLE ACHES, THROAT IRRITATION, HEADACHE AND/OR SOME NAUSEA.    If any questions arise, call your doctor.  If your doctor is not available, please feel free to call the Surgical Center at [phone].  The Center is open Monday through Friday from 7AM to 7PM.      A registered nurse may call you a few days after your surgery to see how you are doing after your procedure.    You may also receive a survey in the mail within the next two weeks and we ask that you take a few moments to complete the survey and return it to us.  Our goal is to provide you with very good care and we value your comments.     Depression / Suicide Risk    As you are discharged from this [facility] facility, it is important to learn how to keep safe from harming yourself.    Recognize the warning signs:  Abrupt changes in personality, positive or negative- including increase in energy   Giving away possessions  Change in eating patterns- significant weight changes-  positive or negative  Change in sleeping patterns- unable to sleep or sleeping all the time   Unwillingness or inability to communicate  Depression  Unusual sadness, discouragement and loneliness  Talk of wanting to die  Neglect of personal appearance   Rebelliousness- reckless behavior  Withdrawal from people/activities they love  Confusion- inability to concentrate     If you or a loved one observes any of these behaviors or has concerns about self-harm, here's what you can do:  Talk about it- your feelings and reasons for harming yourself  Remove any means that you might use to hurt yourself (examples: pills, rope, extension cords, firearm)  Get professional help from the community (Mental Health, Substance Abuse, psychological counseling)  Do not be alone:Call your Safe Contact- someone whom you trust who will be there for you.  Call your local CRISIS HOTLINE  056-3872 or [phone]  Call your local Children's Mobile Crisis Response Team [place] (122) 868-9154 or www.Vapps  Call the toll free National Suicide Prevention Hotlines   National Suicide Prevention Lifeline 435-694-JEKQ (7936)  National [place] Line Network 800-SUICIDE (246-4752)    I acknowledge receipt and understanding of these Home Care instructions.

## 2024-02-12 NOTE — ANESTHESIA PROCEDURE NOTES
Airway    Date/Time: 2/12/2024 11:38 AM    Performed by: Patrick Christy M.D.  Authorized by: Patrick Christy M.D.    Location:  OR  Urgency:  Elective  Indications for Airway Management:  Anesthesia      Spontaneous Ventilation: absent    Sedation Level:  Deep  Preoxygenated: Yes    Patient Position:  Sniffing  Final Airway Type:  Endotracheal airway  Final Endotracheal Airway:  ETT  Cuffed: Yes    Technique Used for Successful ETT Placement:  Direct laryngoscopy    Insertion Site:  Oral  Blade Type:  Aurora  Laryngoscope Blade/Videolaryngoscope Blade Size:  3  ETT Size (mm):  7.0  Measured from:  Teeth  ETT to Teeth (cm):  21  Placement Verified by: auscultation and capnometry    Cormack-Lehane Classification:  Grade I - full view of glottis  Number of Attempts at Approach:  1

## 2024-02-12 NOTE — OR NURSING
1340 received pt from PACU, pt is awake, alert and oriented. Pain scale of 6/10, pt able to tolerate it. Pt denies nausea or vomiting. No dizziness. Vitals checked and recorded.   1405 Pt discharged home in good and stable condition. Reviewed all discharge instructions and answered any questions. IV discontinued. Escorted downstairs via WC at 1405.

## 2024-02-12 NOTE — OR NURSING
Patient received from OR at 1222, bedside report received from anesthesia/OR RN, 2 patient identifiers confirmed . Patient connected to monitors, assessed for general head to toe and pain/nausea. Ordered reviewed and checked. No family to be updated via telephone on patient status, call friend for .  At this time patient meets criteria for D/C to phase II/room. VSS, awake and appropriate, pain minimal or at a tolerable level per patient. Report called to Roshni HASSAN.

## 2024-02-12 NOTE — DISCHARGE INSTR - OTHER INFO
DR. Roberson DISCHARGE INSTRUCTIONS FOLLOWING   URETEROSCOPY AND LASER LITHOTRIPSY      DIET:  You can resume your regular diet. We encourage you to eat well-balanced and nutritious meals.      ACTIVITY:  Please restrain from strenuous activity or heavy lifting (more than 20 pounds) for the next week.  Please walk daily as much as tolerated, making exercise a part of your daily life. Do not drive while using narcotics for pain control. You may resumes showering and bathing without any restrictions.     WOUND CARE:  1. You have no dressing or open wounds to manage.   2. You do have a internal ureteral stent on strings.  Please do not pull these strings as they will be used at your follow up visit to remove the stent.     MEDICATIONS:  1. Please use an over the counter antiinflammatory (ie Ibuprofen, naproxen, Ketoralac) and/or Tylenol for pain control as needed.  2. You may take 3 days of pyridium (AZO) as prescribed for numbing the bladder and burning with urination.  3b. If you have severe pain refractory to Ibuprofen you may use tramadol for pain control as well as prescribed. If taking a narcotic, please use a stool softener like miralax or colace to prevent constipation.  3c. Due to allergy no tamsulosin prescribed.   4. If you are using aspirin, Plavix, or coumadin, please don’t restart these medications until two days after your discharge if you are not having large amounts of blood in the urine.    FOLLOW-UP:  We will call you to schedule follow up for stent removal in 5-7 days, then additional follow up at 6 weeks.     We recommend an ultrasound at 6-8 weeks to confirm the swelling (hydronephrosis) of the kidney(s) has resolved.     We will plan to discuss stone metabolic work-up at your follow-up in 6 to 8 weeks, this includes urine and blood tests that can help us determine why you form kidney stones.    WARNING SIGNS:  Fever greater than 101 degrees Fahrenheit, chills, nausea or vomiting, Large amount of  clots in urine that make it difficult to urinate or for urine to drain from jara, increasing pain, or abdominal swelling. If you are experiencing these symptoms, call the Urology Clinic or go to your local PCP or emergency room.    It is normal to see blood in your urine for up to 2 weeks even from surgery. The urine may clear up entirely, and then turn bloody again a few days later depending on your activity level; do not be alarmed. However, if you experience severe pain or tenderness, have a lot of increased bleeding, or find that you are unable to urinate because of large clots, please notify your doctor immediately           Josafat Roberson MD  [address] [place] [id number]   [phone]

## 2024-02-12 NOTE — ANESTHESIA PREPROCEDURE EVALUATION
Case: [id number] Date/Time: 02/12/24 1145    Procedures:       CYSTOSCOPY, WITH RIGHT URETEROSCOPY, WITH LITHOTRIPSY, WITH INSERTION OF RIGHT URETERAL STENT      URETEROSCOPY      LITHOTRIPSY, USING LASER    Pre-op diagnosis: KIDNEY STONE    Location: [facility] OR 18 / SURGERY [facility]    Surgeons: Josafat Roberson M.D.            Relevant Problems   ANESTHESIA   (positive) TONYA (obstructive sleep apnea)      PULMONARY   (positive) Chronic obstructive lung disease (HCC)   (positive) Mild intermittent asthma without complication   (positive) Moderate persistent asthma with acute exacerbation   (positive) Moderate persistent asthma without complication   (positive) Uncomplicated asthma      NEURO   (positive) Hemiplegic 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, not intractable   (positive) Migraine   (positive) Migraine, hemiplegic      CARDIAC   (positive) Atrial fibrillation (HCC)   (positive) Heart murmur   (positive) Hemiplegic 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, not intractable   (positive) Inappropriate sinus tachycardia   (positive) Migraine   (positive) Migraine, hemiplegic   (positive) SVT (supraventricular tachycardia)   (positive) Supraventricular tachycardia      GI   (positive) GERD (gastroesophageal reflux disease)         (positive) Fatty liver disease, nonalcoholic   (positive) Kidney stone       Physical Exam    Airway   Mallampati: II  TM distance: >3 FB  Neck ROM: full       Cardiovascular - normal exam  Rhythm: regular  Rate: normal  (-) murmur     Dental - normal exam           Pulmonary - normal exam  Breath sounds clear to auscultation     Abdominal    Neurological - normal exam                   Anesthesia Plan    ASA 3   ASA physical status 3 criteria: morbid obesity - BMI greater than or equal to 40    Plan - general       Airway plan will be ETT          Induction: intravenous    Postoperative Plan: Postoperative administration of opioids is intended.    Pertinent diagnostic labs and testing  reviewed    Informed Consent:    Anesthetic plan and risks discussed with patient.    Use of blood products discussed with: patient whom consented to blood products.

## 2024-02-12 NOTE — OP REPORT
Urologic Surgery Operative Report     Date of Procedure: 2/12/2024    Pre-op Diagnosis: 1. Right kidney stones   Post-op Diagnosis: Same as above   Procedure: 1. Cystoscopy, RIght Ureteroscopy w/ laser lithotripsy, JJ stent: 03153      Surgeon: Surgeon(s) and Role:     * Josafat Roberson M.D. - Primary       Anesthesia: General, GET  Anesthesiologist: Patrick Christy M.D.   Estimated Blood Loss: minimal       Specimens: 1. Right Stone for chemical analysis    Complications: None   Condition: Stable, procedure well tolerated    Drains: 1. Right [id number] JJ stent(on strings)  2. No jara   Disposition:  1. PACU, discharge after voiding  2. Follow up 5-7 days for ureteral stent removal by nursing staff at [facility]. 8 weeks for renal ultrasound at [facility] with physician assistant to discuss metabolic stone prevention.      Findings 1.  Within the right kidney there were multiple small Wing's plaques and embedded small stones within the renal papilla throughout the kidney.  No large stone was identified within the collecting system.  Multiple Wing's plaques and embedded stones were lasered but all too small to stone basket extract.    2.  Cystourethroscopy demonstrated: Normal urethra, no significant cystitis, trigone with normal anatomic location.  No foreign bodies, no stones or masses appreciated.  3.  Ureteroscopy demonstrated: No stones within the ureter.  See above for renal parenchymal stone discussion  4.  Plain film fluoroscopy demonstrated adequate placement of 6 x 24 double-J ureteral stent with strings      Indication:   This is a complex 33-year-old female with a history of urinary tract infections and right flank pain.  CT scan demonstrated very small right-sided stones.  I discussed with the patient that ureteroscopy and treatment of the stones may not relieve her right flank pain but could help theoretically improve her urinary tract infection risk.  Patient accepted these risks  and after a full discussion of alternatives, risks, and benefits the patient consented to proceeding with Ureteroscopy, laser lithotripsy and possible JJ stent placement on the respective side.  She understands the risk of inability to access ureter, the need for second procedures, the possibility of negative ureteroscopy, that she may have stent discomfort until this is removed, bleeding, infection, ureteral injury or stricture, bladder injury, post op urinary retention requiring jara catheter, and the general pulmonary and cardiovascular risks associated with anesthesia.     Procedure Details:   The patient was taken to operating room and placed on table in supine position.  Gentamicin was administered prior to the start of the procedure based on previous urine cultures.  She has multiple, 28, allergies.  Sequential compression devices were placed for deep venous thrombosis prophylaxis. After induction of general anesthesia, both legs were placed in Paresh stirrups in the standard lithotomy position.  A timeout was held confirming the correct patient, procedure and laterality.   The perineal area was prepped and draped in a sterile fashion. A 22French rigid cystoscope was inserted into the urethra and the bladder was emptied and then distended. See above cystoscopic findings.     Sensor tip wire was then placed into the right kidney under fluoroscopy.  I then sequentially dilated with first the inner portion of a 28 cm ureteral access sheath.  Then both the 1113 x 28 cm access sheath was placed over the wire and into the right proximal ureter under fluoroscopic guidance with minimal resistance.  Using a flexible ureteroscope I then performed surveillance of all the calyces.  She has a fairly complex collecting system with very hard to access lower pole calyces due to extreme angle.  I was able to visualize these calyces and there were no obvious large stones.  The majority of the small Wing's plaques and  embedded stones were found in the mid and upper pole of the right kidney.  He has a 200 µm homing laser to laser these small stones and free them from the parenchyma.  These were too small to actually basket extract successfully.  Once we perform surveillance of all calyces once again and there are no other large stone fragments that could be appreciated I backed the ureteral access sheath down the ureter under direct vision with a sensor tip wire replaced into the right kidney.  There were no stones within the right ureter.  There were no significant traumas to the right ureter.    Over the sensor tip wire I then placed a 6 x 24 double-J ureteral stent with the strings tied at the level of the stent with 2 individual strings hanging out of the urethral meatus.  After the bladder was emptied I confirmed with fluoroscopy of both the proximal and distal curls were adequately positioned.  The bladder was emptied.  The strings were then shortened and tucked into the vagina.     The patient was awoken from anesthesia and brought to recovery in satisfactory condition.        Josafat Roberson M.D.   [address]  [place] [address]   [phone]

## 2024-02-12 NOTE — ANESTHESIA POSTPROCEDURE EVALUATION
Patient: Kristin Balderrama    Procedure Summary       Date: 02/12/24 Room / Location: [place] [address] / SURGERY [facility]    Anesthesia Start: 1131 Anesthesia Stop: 1226    Procedures:       CYSTOSCOPY, WITH RIGHT URETEROSCOPY, WITH LITHOTRIPSY, WITH INSERTION OF RIGHT URETERAL STENT (Right: Ureter)      URETEROSCOPY (Right: Ureter)      LITHOTRIPSY, USING LASER (Right: Ureter) Diagnosis: (KIDNEY STONE)    Surgeons: Josafat Roberson M.D. Responsible Provider: Patrick Christy M.D.    Anesthesia Type: general ASA Status: 3            Final Anesthesia Type: general  Last vitals  BP   Blood Pressure: 124/76    Temp   36.3 °C (97.4 °F)    Pulse   63   Resp   17    SpO2   98 %      Anesthesia Post Evaluation    Patient location during evaluation: PACU  Patient participation: complete - patient participated  Level of consciousness: awake and alert    Airway patency: patent  Anesthetic complications: no  Cardiovascular status: hemodynamically stable  Respiratory status: acceptable  Hydration status: euvolemic    PONV: none          No notable events documented.     Nurse Pain Score: 7 (NPRS)

## 2024-02-16 ENCOUNTER — APPOINTMENT (OUTPATIENT)
Dept: URGENT CARE | Facility: CLINIC | Age: 34
End: 2024-02-16
Payer: MEDICARE

## 2024-02-16 ENCOUNTER — APPOINTMENT (OUTPATIENT)
Dept: RADIOLOGY | Facility: MEDICAL CENTER | Age: 34
DRG: 690 | End: 2024-02-16
Attending: STUDENT IN AN ORGANIZED HEALTH CARE EDUCATION/TRAINING PROGRAM
Payer: MEDICARE

## 2024-02-16 ENCOUNTER — HOSPITAL ENCOUNTER (EMERGENCY)
Facility: MEDICAL CENTER | Age: 34
DRG: 690 | End: 2024-02-16
Attending: STUDENT IN AN ORGANIZED HEALTH CARE EDUCATION/TRAINING PROGRAM
Payer: MEDICARE

## 2024-02-16 VITALS
HEART RATE: 72 BPM | WEIGHT: 262.13 LBS | SYSTOLIC BLOOD PRESSURE: 166 MMHG | DIASTOLIC BLOOD PRESSURE: 72 MMHG | HEIGHT: 65 IN | TEMPERATURE: 98.8 F | OXYGEN SATURATION: 96 % | BODY MASS INDEX: 43.67 KG/M2 | RESPIRATION RATE: 16 BRPM

## 2024-02-16 DIAGNOSIS — N30.90 CYSTITIS: ICD-10-CM

## 2024-02-16 DIAGNOSIS — R31.9 HEMATURIA, UNSPECIFIED TYPE: ICD-10-CM

## 2024-02-16 DIAGNOSIS — R10.9 FLANK PAIN: ICD-10-CM

## 2024-02-16 LAB
ALBUMIN SERPL BCP-MCNC: 4.8 G/DL (ref 3.2–4.9)
ALBUMIN/GLOB SERPL: 2 G/DL
ALP SERPL-CCNC: 79 U/L (ref 30–99)
ALT SERPL-CCNC: 34 U/L (ref 2–50)
ANION GAP SERPL CALC-SCNC: 14 MMOL/L (ref 7–16)
APPEARANCE UR: ABNORMAL
AST SERPL-CCNC: 16 U/L (ref 12–45)
BACTERIA #/AREA URNS HPF: ABNORMAL /HPF
BASOPHILS # BLD AUTO: 0.4 % (ref 0–1.8)
BASOPHILS # BLD: 0.03 K/UL (ref 0–0.12)
BILIRUB SERPL-MCNC: 0.8 MG/DL (ref 0.1–1.5)
BUN SERPL-MCNC: 10 MG/DL (ref 8–22)
CALCIUM ALBUM COR SERPL-MCNC: 9.1 MG/DL (ref 8.5–10.5)
CALCIUM SERPL-MCNC: 9.7 MG/DL (ref 8.5–10.5)
CHLORIDE SERPL-SCNC: 109 MMOL/L (ref 96–112)
CO2 SERPL-SCNC: 17 MMOL/L (ref 20–33)
COLOR UR: ABNORMAL
CREAT SERPL-MCNC: 0.75 MG/DL (ref 0.5–1.4)
EOSINOPHIL # BLD AUTO: 0.13 K/UL (ref 0–0.51)
EOSINOPHIL NFR BLD: 1.8 % (ref 0–6.9)
EPI CELLS #/AREA URNS HPF: ABNORMAL /HPF
ERYTHROCYTE [DISTWIDTH] IN BLOOD BY AUTOMATED COUNT: 42.5 FL (ref 35.9–50)
GFR SERPLBLD CREATININE-BSD FMLA CKD-EPI: 107 ML/MIN/1.73 M 2
GLOBULIN SER CALC-MCNC: 2.4 G/DL (ref 1.9–3.5)
GLUCOSE SERPL-MCNC: 111 MG/DL (ref 65–99)
HCG SERPL QL: NEGATIVE
HCT VFR BLD AUTO: 43.1 % (ref 37–47)
HGB BLD-MCNC: 15.1 G/DL (ref 12–16)
HYALINE CASTS #/AREA URNS LPF: ABNORMAL /LPF
IMM GRANULOCYTES # BLD AUTO: 0.02 K/UL (ref 0–0.11)
IMM GRANULOCYTES NFR BLD AUTO: 0.3 % (ref 0–0.9)
LIPASE SERPL-CCNC: 24 U/L (ref 11–82)
LYMPHOCYTES # BLD AUTO: 1.44 K/UL (ref 1–4.8)
LYMPHOCYTES NFR BLD: 19.7 % (ref 22–41)
MCH RBC QN AUTO: 32 PG (ref 27–33)
MCHC RBC AUTO-ENTMCNC: 35 G/DL (ref 32.2–35.5)
MCV RBC AUTO: 91.3 FL (ref 81.4–97.8)
MICRO URNS: ABNORMAL
MONOCYTES # BLD AUTO: 0.61 K/UL (ref 0–0.85)
MONOCYTES NFR BLD AUTO: 8.4 % (ref 0–13.4)
NEUTROPHILS # BLD AUTO: 5.07 K/UL (ref 1.82–7.42)
NEUTROPHILS NFR BLD: 69.4 % (ref 44–72)
NRBC # BLD AUTO: 0 K/UL
NRBC BLD-RTO: 0 /100 WBC (ref 0–0.2)
PLATELET # BLD AUTO: 136 K/UL (ref 164–446)
PMV BLD AUTO: 12.1 FL (ref 9–12.9)
POTASSIUM SERPL-SCNC: 3.9 MMOL/L (ref 3.6–5.5)
PROT SERPL-MCNC: 7.2 G/DL (ref 6–8.2)
RBC # BLD AUTO: 4.72 M/UL (ref 4.2–5.4)
RBC # URNS HPF: ABNORMAL /HPF
SODIUM SERPL-SCNC: 140 MMOL/L (ref 135–145)
SP GR UR STRIP.AUTO: ABNORMAL
WBC # BLD AUTO: 7.3 K/UL (ref 4.8–10.8)
WBC #/AREA URNS HPF: ABNORMAL /HPF

## 2024-02-16 PROCEDURE — 700105 HCHG RX REV CODE 258: Mod: UD | Performed by: STUDENT IN AN ORGANIZED HEALTH CARE EDUCATION/TRAINING PROGRAM

## 2024-02-16 PROCEDURE — 36415 COLL VENOUS BLD VENIPUNCTURE: CPT

## 2024-02-16 PROCEDURE — 700101 HCHG RX REV CODE 250: Mod: UD | Performed by: STUDENT IN AN ORGANIZED HEALTH CARE EDUCATION/TRAINING PROGRAM

## 2024-02-16 PROCEDURE — 83690 ASSAY OF LIPASE: CPT

## 2024-02-16 PROCEDURE — 99285 EMERGENCY DEPT VISIT HI MDM: CPT

## 2024-02-16 PROCEDURE — A9270 NON-COVERED ITEM OR SERVICE: HCPCS | Mod: UD | Performed by: STUDENT IN AN ORGANIZED HEALTH CARE EDUCATION/TRAINING PROGRAM

## 2024-02-16 PROCEDURE — 96375 TX/PRO/DX INJ NEW DRUG ADDON: CPT

## 2024-02-16 PROCEDURE — 74177 CT ABD & PELVIS W/CONTRAST: CPT

## 2024-02-16 PROCEDURE — 700117 HCHG RX CONTRAST REV CODE 255: Mod: UD | Performed by: STUDENT IN AN ORGANIZED HEALTH CARE EDUCATION/TRAINING PROGRAM

## 2024-02-16 PROCEDURE — 96374 THER/PROPH/DIAG INJ IV PUSH: CPT

## 2024-02-16 PROCEDURE — 84703 CHORIONIC GONADOTROPIN ASSAY: CPT

## 2024-02-16 PROCEDURE — 81001 URINALYSIS AUTO W/SCOPE: CPT

## 2024-02-16 PROCEDURE — 85025 COMPLETE CBC W/AUTO DIFF WBC: CPT

## 2024-02-16 PROCEDURE — 700102 HCHG RX REV CODE 250 W/ 637 OVERRIDE(OP): Mod: UD | Performed by: STUDENT IN AN ORGANIZED HEALTH CARE EDUCATION/TRAINING PROGRAM

## 2024-02-16 PROCEDURE — 700111 HCHG RX REV CODE 636 W/ 250 OVERRIDE (IP): Mod: JZ,UD | Performed by: STUDENT IN AN ORGANIZED HEALTH CARE EDUCATION/TRAINING PROGRAM

## 2024-02-16 PROCEDURE — 80053 COMPREHEN METABOLIC PANEL: CPT

## 2024-02-16 RX ORDER — SODIUM CHLORIDE, SODIUM LACTATE, POTASSIUM CHLORIDE, CALCIUM CHLORIDE 600; 310; 30; 20 MG/100ML; MG/100ML; MG/100ML; MG/100ML
1000 INJECTION, SOLUTION INTRAVENOUS ONCE
Status: COMPLETED | OUTPATIENT
Start: 2024-02-16 | End: 2024-02-16

## 2024-02-16 RX ORDER — NITROFURANTOIN 25; 75 MG/1; MG/1
100 CAPSULE ORAL 2 TIMES DAILY
Qty: 10 CAPSULE | Refills: 0 | Status: ON HOLD | OUTPATIENT
Start: 2024-02-16 | End: 2024-02-22

## 2024-02-16 RX ORDER — METOCLOPRAMIDE HYDROCHLORIDE 5 MG/ML
10 INJECTION INTRAMUSCULAR; INTRAVENOUS ONCE
Status: COMPLETED | OUTPATIENT
Start: 2024-02-16 | End: 2024-02-16

## 2024-02-16 RX ORDER — NITROFURANTOIN 25; 75 MG/1; MG/1
100 CAPSULE ORAL ONCE
Status: COMPLETED | OUTPATIENT
Start: 2024-02-16 | End: 2024-02-16

## 2024-02-16 RX ADMIN — SODIUM CHLORIDE, POTASSIUM CHLORIDE, SODIUM LACTATE AND CALCIUM CHLORIDE 1000 ML: 600; 310; 30; 20 INJECTION, SOLUTION INTRAVENOUS at 18:55

## 2024-02-16 RX ADMIN — KETAMINE HYDROCHLORIDE 25 MG: 10 INJECTION INTRAMUSCULAR; INTRAVENOUS at 18:36

## 2024-02-16 RX ADMIN — IOHEXOL 96 ML: 350 INJECTION, SOLUTION INTRAVENOUS at 19:45

## 2024-02-16 RX ADMIN — NITROFURANTOIN MONOHYDRATE/MACROCRYSTALLINE 100 MG: 25; 75 CAPSULE ORAL at 20:32

## 2024-02-16 RX ADMIN — METOCLOPRAMIDE 10 MG: 5 INJECTION, SOLUTION INTRAMUSCULAR; INTRAVENOUS at 18:32

## 2024-02-16 ASSESSMENT — FIBROSIS 4 INDEX: FIB4 SCORE: 0.69

## 2024-02-16 ASSESSMENT — LIFESTYLE VARIABLES: DO YOU DRINK ALCOHOL: NO

## 2024-02-16 NOTE — ED NOTES
Pt complaining of increased pain and dizziness in the flank into the abdomen. Pt re-vitaled, apologized for wait times and educated on acuity based wait times.

## 2024-02-16 NOTE — ED TRIAGE NOTES
".  Chief Complaint   Patient presents with    Pain     Pt with a nephrostomy stent placed on Monday, pt. States urine is foul smelling and she's is incontinent, states she feels like she has an infection starting, pain 9/10 with tylenol and ibuprofen      .BP (!) 159/100   Pulse 84   Temp 37.3 °C (99.1 °F) (Temporal)   Resp 16   Ht 1.651 m (5' 5\")   Wt 119 kg (262 lb 2 oz)   SpO2 98%   BMI 43.62 kg/m²     .Pt is alert and oriented, speaking in full sentences, follows commands and responds appropriately to questions Resp are even and unlabored.  Skin pink warm and dry     Pt placed in lobby. Pt educated on triage process. Pt encouraged to alert staff for any changes.    "

## 2024-02-17 NOTE — ED NOTES
Pt given d/c instructions, f/u info and RX x 1 with verbal understanding.  VSS at discharge.  PIV d/c'd with tip intact.  Pt taken to lobby in wheelchair.  All belongings in possession on discharge.  Pt escorted to the lobby by RN.

## 2024-02-17 NOTE — DISCHARGE INSTRUCTIONS
Follow-up with urology [place], return if you are vomiting and unable eat or drink, other symptoms you find concerning.

## 2024-02-17 NOTE — ED NOTES
Pt back to room from CT.  Bedside report given to SONYA Vargas.  Pt on RA. Family at bedside.   Call light in reach.

## 2024-02-17 NOTE — ED NOTES
Attempted multiple U/S guided IV without success, another RN to bedside for placement of IV.  IV placed by U/S and pt medicated per MAR.

## 2024-02-17 NOTE — ED NOTES
Bedside report received from SONYA Infante, assumed care of patient.  Fall risk interventions in place: Move the patient closer to the nurse's station, Patient's personal possessions are with in their safe reach, and Keep floor surfaces clean and dry (all applicable per [place] Fall risk assessment)   Continuous monitoring: Cardiac Leads, Pulse Ox, or Blood Pressure  IVF/IV medications: LR  Oxygen: Room Air  Bedside sitter: Not Applicable   Isolation: Not Applicable

## 2024-02-17 NOTE — ED PROVIDER NOTES
"  ER Provider Note    Scribed for Adele Gomez M.D. by Rocio Simmons. 2/16/2024   4:46 PM    Primary Care Provider: Torres Brody M.D.    CHIEF COMPLAINT  Chief Complaint   Patient presents with    Pain     Pt with a nephrostomy stent placed on Monday, pt. States urine is foul smelling and she's is incontinent, states she feels like she has an infection starting, pain 9/10 with tylenol and ibuprofen      EXTERNAL RECORDS REVIEWED  Inpatient Notes cystoscopy, lithotripsy ureteral stent insertion, 2/12/2024 with Dr. Roberson    HPI/ROS  LIMITATION TO HISTORY   Select: : None  OUTSIDE HISTORIAN(S):  Parent at bedside providing history inquires regarding additional pain medications    Kristin Balderrama is a 33 y.o. female who presents to the ED for evaluation of abdominal pain onset three days ago. Patient states she had a nephrostomy stent placed on 2/12. She reports she has been experiencing burning right flank pain since the procedure in addition to nausea, dysuria, and urinary incontinence. She comments she has a \"blood dump\" in her urine every night. She notes she to take \"a lot\" of Ibuprofen with no alleviation. She adds she felt constipated and took some medications for that as well. Denies taking blood thinners. Patient notes she has been given Ketamine in the past for severe pain with alleviation.  No fever at home.    PAST MEDICAL HISTORY  Past Medical History:   Diagnosis Date    Abdominal pain     Anginal syndrome     random chest pain especially with tachycardia    Apnea, sleep     Arrhythmia     \"sinus tachycardia\", cariologist, Dr. Kumar; ablation 2/2016    Arthritis     osteo    ASTHMA     when around smoke    Back pain     Borderline personality disorder (HCC)     Breath shortness     with tachycardia    Bronchitis 02/08/2022    Last time was 12/21    Cardiac arrhythmia     Chickenpox     Chronic UTI 09/18/2010    Cough     Daytime sleepiness     Dental disorder 03/08/2021    infected tooth    " "Depression     Diabetes (HCC)     Diarrhea     incontinece    Disorder of thyroid     Hashimoto's    Fall     Fatigue     Frequent headaches     Gasping for breath     Gynecological disorder     PCOS    Headache(784.0)     Heart burn     Heart murmur     History of falling     Indigestion     Migraine     Mitochondrial disease (HCC)     Multiple personality disorder (HCC)     Nausea     Obesity     Other fatigue 06/29/2020    Pain 08/15/2012    back, 7/10    Painful joint     PCOS (polycystic ovarian syndrome)     Pneumonia 2012 12/2020    POTS (postural orthostatic tachycardia syndrome)     Psychosis (HCC)     Ringing in ears     Scoliosis     Shortness of breath     O2 as needed    Sinus tachycardia 10/31/2013    Sleep apnea     CPAP \"pulmonary doctor took me off mid year 2016\"    Snoring     Supraventricular tachycardia 4/10/2019    Tonsillitis     Transverse myelitis (HCC)     2/8/22: Per pt: not anymore    Tuberculosis     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    Urinary bladder disorder     Suprapubic cath. 2/8/22: Not anymore.     Urinary incontinence     Weakness     Wears glasses        SURGICAL HISTORY  Past Surgical History:   Procedure Laterality Date    AK CYSTOSCOPY,INSERT URETERAL STENT Right 2/12/2024    Procedure: CYSTOSCOPY, WITH RIGHT URETEROSCOPY, WITH LITHOTRIPSY, WITH INSERTION OF RIGHT URETERAL STENT;  Surgeon: Josafat Roberson M.D.;  Location: [facility];  Service: Urology    AK CYSTO/URETERO/PYELOSCOPY, DX Right 2/12/2024    Procedure: URETEROSCOPY;  Surgeon: Josafat Roberson M.D.;  Location: [facility];  Service: Urology    LASERTRIPSY Right 2/12/2024    Procedure: LITHOTRIPSY, USING LASER;  Surgeon: Josafat Roberson M.D.;  Location: [facility];  Service: Urology    INGUINAL HERNIA LAPAROSCOPIC Right 07/21/2023    Procedure: LAPAROSCOPIC RIGHT INGUINAL HERNIA REPAIR WITH MESH;  Surgeon: Joe Noyola M.D.;  Location: [facility];  Service: General    " HERNIA REPAIR Right 07/21/2023    PB PERCUT FIX PBOX/NECK FEMUR FX Left 01/28/2023    Procedure: FIXATION, HIP, USING CANNULATED SCREW;  Surgeon: Noman Abdul M.D.;  Location: SURGERY [facility];  Service: Orthopedics    TN LAP,DIAGNOSTIC ABDOMEN  02/14/2022    Procedure: LAPAROSCOPY;  Surgeon: Seamus Pisano M.D.;  Location: SURGERY SAME DAY [facility];  Service: Gynecology    OVARIAN CYSTECTOMY Right 02/14/2022    Procedure: EXCISION, CYST, OVARY;  Surgeon: Seamus Pisano M.D.;  Location: SURGERY SAME DAY [facility];  Service: Gynecology    BOWEL STIMULATOR INSERTION  03/10/2021    Procedure: INSERTION, ELECTRODE LEADS AND PULSE GENERATOR, NEUROSTIMULATOR, SACRAL - REMOVAL OF INTERSTIM WITH REPLACEMENT OF SACRAL NEUROMODULATION DEVICE;  Surgeon: Joe Noyola M.D.;  Location: [facility];  Service: General    MUSCLE BIOPSY Right 01/26/2017    Procedure: MUSCLE BIOPSY - THIGH;  Surgeon: Isidro Vigil M.D.;  Location: [facility];  Service:     GASTROSCOPY WITH BALLOON DILATATION N/A 01/04/2017    Procedure: GASTROSCOPY WITH DILATATION;  Surgeon: Torres Vargas M.D.;  Location: [facility];  Service:     BOWEL STIMULATOR INSERTION  07/13/2016    Procedure: BOWEL STIMULATOR INSERTION FOR PERMANENT INTERSTIM SACRAL IMPLANT;  Surgeon: Joe Noyola M.D.;  Location: [facility];  Service:     RECOVERY  01/27/2016    Procedure: CATH LAB EP STUDY WITH SINUS NODE MODIFICATION ABHINAV;  Surgeon: Recoveryonly Surgery;  Location: SURGERY PRE-POST PROC UNIT [facility];  Service:     OTHER CARDIAC SURGERY  01/2016    cardiac ablation    NEURO DEST FACET L/S W/IG SNGL  04/21/2015    Performed by Reza Tabor at [facility]    LUMBAR FUSION ANTERIOR  08/21/2012    Performed by SUSIE SAWANT at [facility] ORS    ALYSSA BY LAPAROSCOPY  08/29/2010    Performed by SHAYY JOHNS at [facility] ORS    LAMINOTOMY      OTHER ABDOMINAL SURGERY       TONSILLECTOMY      tonsillectomy       FAMILY HISTORY  Family History   Problem Relation Age of Onset    Hypertension Mother     Sleep Apnea Mother     Heart Disease Mother     Other Mother         hypothryod    Hypertension Maternal Uncle     Heart Disease Maternal Grandmother     Hypertension Maternal Grandmother     No Known Problems Sister     Other Sister         Narcolepsy;fibromyalsia;bone;nerve    Genitourinary () Problems Sister         endometriosis       SOCIAL HISTORY   reports that she has never smoked. She has never used smokeless tobacco. She reports that she does not drink alcohol and does not use drugs.    CURRENT MEDICATIONS  Previous Medications    ADALIMUMAB 80 MG/0.8ML PEN-INJECTOR KIT    Inject 80 mg under the skin every 14 days.    DIPHENHYDRAMINE (BENADRYL) 25 MG TAB    Take 25-50 mg by mouth 2 times a day. 25 mg in the morning, 50 mg in the evening    DOCUSATE SODIUM 250 MG CAP    Take 250 mg by mouth 2 times a day.    ETONOGESTREL (NEXPLANON) 68 MG IMPLANT IMPLANT    Inject 1 Each under the skin see administration instructions. Pt reports she is not sure when she had this medications injected last, pt reports she still has it in her arm  Every 3 years to change    FLUCONAZOLE (DIFLUCAN) 150 MG TABLET    Take 150 mg by mouth every Wednesday.    GALCANEZUMAB-GNLM (EMGALITY) 120 MG/ML SOLUTION AUTO-INJECTOR    Inject 120 mg under the skin every 30 (thirty) days.    IVABRADINE (CORLANOR) 7.5 MG TAB TABLET    Take 1 Tablet by mouth 2 times a day with meals.    LAMOTRIGINE (LAMICTAL) 25 MG TAB    Take 50 mg by mouth 2 times a day. 50 mg = 2 tablets    MELATONIN 10 MG TAB    Take 10 mg by mouth at bedtime.    METOPROLOL SR (TOPROL XL) 25 MG TABLET SR 24 HR    Take 1 Tablet by mouth every day.    OMEPRAZOLE (PRILOSEC) 20 MG DELAYED-RELEASE CAPSULE    Take 20 mg by mouth every day.    ONDANSETRON (ZOFRAN ODT) 4 MG TABLET DISPERSIBLE    Take 4 mg by mouth every 8 hours as needed for  "Nausea/Vomiting.    SODIUM CHLORIDE (SALT) 1 GM TAB    TAKE 1 TABLET BY MOUTH THREE TIMES A DAY WITH MEALS ( NOT COVERED/INS )    TIZANIDINE (ZANAFLEX) 4 MG TAB    Take 4 mg by mouth 2 times a day.    TOPIRAMATE (TOPAMAX) 50 MG TABLET    Take 50 mg by mouth 2 times a day.    ZIPRASIDONE (GEODON) 40 MG CAP    Take 1 capsule by mouth at bedtime.       ALLERGIES  Allergies   Allergen Reactions    Cefdinir Shortness of Breath and Itching     Tolerated 1/18/17  Tolerates ceftriaxone  Tolerated augmentin 8/2019     Depakote [Divalproex Sodium] Unspecified     Muscle spasms/muscle pain and weakness      Doxycycline Anaphylaxis and Vomiting     Other reaction(s): pustules/blisters  Other reaction(s): stomach pain    Montelukast [Singulair] Unspecified     Cardiac effusion    Vancomycin Itching     Pt becomes flushed in face and chest.   RXN=7/10/16    Wound Dressing Adhesive Rash     By pt report-\"removes skin totally off\"    Amitriptyline Unspecified     Headaches      Amoxicillin Rash          Aripiprazole Unspecified    Aripiprazole [Abilify] Unspecified     Headaches/muscle twitching      Clindamycin Nausea         Other reaction(s): nausea stomach pain    Depakote [Divalproex Sodium]     Erythromycin Rash     .  Other reaction(s): nausea stomach pain    Flomax [Tamsulosin Hydrochloride] Swelling    Hydromorphone      Other reaction(s): vomiting    Levaquin Unspecified     Severe muscle cramps in legs  RXN=unknown  Other reaction(s): leg muscle cramps    Metformin Unspecified     Increased lactic acid     Other reaction(s): itching and rash/nausea vomiting    Tamsulosin Swelling     Swelling of legs    Tape Rash     Tears skin off  coban with Tegaderm tape ok intermittently  RXN=ongoing    Ampicillin Rash     Pt reports that she received a rash     Ciprofloxacin Rash          Keflex Rash     Pt states she gets a rash with this medication  Tolerates ceftriaxone  Can take with Benadryl    Levofloxacin Unspecified     Leg " "muscle cramps    Metronidazole Rash     \"Vision problems\"  Other reaction(s): vision problems    Montelukast     Penicillins Hives     Can take with Benadryl    Sulfa Drugs Itching, Myalgia and Hives     Muscle pain and weakness  Other reaction(s): unknown    Valproic Acid Rash     .        PHYSICAL EXAM  BP (!) 141/81   Pulse 83   Temp 37.3 °C (99.1 °F) (Temporal)   Resp 17   Ht 1.651 m (5' 5\")   Wt 119 kg (262 lb 2 oz)   SpO2 95%   BMI 43.62 kg/m²    General: Heavyset, chronically ill-appearing female sitting in bed. Nontoxic appearing  Head: Normocephalic atraumatic  Eyes: Extraocular motion intact  Neck: Supple, no rigidity  Cardiovascular: Regular rate and rhythm no murmurs rubs or gallops  Respiratory: Clear to auscultation bilaterally, equal chest rise and fall, no increased work of breathing  Abdomen: Soft, no guarding, right CVA tenderness  Musculoskeletal: Warm and well perfused, no peripheral edema  Neuro: Alert, no focal deficits  Integumentary: No wounds or rashes     DIAGNOSTIC STUDIES    Labs:   Labs Reviewed   URINALYSIS,CULTURE IF INDICATED - Abnormal; Notable for the following components:       Result Value    Color Orange (*)     Character Hazy (*)     All other components within normal limits    Narrative:     Indication for culture:->Patient WITHOUT an indwelling Andrade  catheter in place with new onset of Dysuria, Frequency,  Urgency, and/or Suprapubic pain   URINE MICROSCOPIC (W/UA) - Abnormal; Notable for the following components:    WBC 5-10 (*)     RBC 20-50 (*)     Bacteria Moderate (*)     All other components within normal limits    Narrative:     Indication for culture:->Patient WITHOUT an indwelling Andrade  catheter in place with new onset of Dysuria, Frequency,  Urgency, and/or Suprapubic pain   CBC WITH DIFFERENTIAL - Abnormal; Notable for the following components:    Platelet Count 136 (*)     Lymphocytes 19.70 (*)     All other components within normal limits   COMP METABOLIC " PANEL - Abnormal; Notable for the following components:    Co2 17 (*)     Glucose 111 (*)     All other components within normal limits   LIPASE   HCG QUAL SERUM   ESTIMATED GFR     Moderate bacteria with 5-10 WBCs, no elevation of leuk esterase, no elevation of nitrites, base deficit in the setting of dehydration.  No leukocytosis, no significant anemia.    Radiology:       Radiologist interpretation:   CT-ABDOMEN-PELVIS WITH   Final Result      1.  Satisfactory position of the right double-J ureteral stent. No hydronephrosis.   2.  Nonobstructive 2 mm right superior pole calcification. No bladder stones.   3.  Question of mild superior bladder wall thickening could represent cystitis.   4.  Stable mild splenomegaly.         INITIAL ASSESSMENT COURSE AND PLAN  Care Narrative     12:30 PM - HCG qual serum, UA w/ culture, and Urine microscopic ordered per triage protocol.    4:46 PM - Patient was evaluated at bedside. Ordered for CT-Abdomen-Pelvis, Lipase, CMP, and CBC w/ diff to evaluate. The patient will be medicated with Reglan 10 mg injection and Ketalar 25 mg in NS 50 mL for her symptoms. Patient verbalizes understanding and support with my plan of care.  Differential diagnoses include but not limited to: Displaced stent, obstructed stent, obstructed stone, pyelonephritis, cystitis, chronic pain, constipation        ED Observation Status? No; Patient does not meet criteria for ED Observation.     HYDRATION: Based on the patient's presentation of Dehydration the patient was given IV fluids. IV Hydration was used because oral hydration failed due to poor p.o. intake. Upon recheck following hydration, the patient was improved.    Given recent stent placement, worsening pain, unfortunately, did not believe renal ultrasound would be sufficient to fully evaluate for potential complication of stent, versus obstruction, and despite patient's recent CT imaging will need to obtain another CT abdomen pelvis.    Obtained  urinalysis which was equivocal, will treat for cystitis with nitrofurantoin, stent appears in place, tachycardia.  Patient reports her pain is significantly improved after ketamine infusion, we discussed the results of her studies, need for follow-up with urology [place] severe worsening pain, discussed pain management at home, and patient was discharged.  Patient agreeable to plan of care, patient and her mother verbalized understanding agreement with plan of care at this time.        DISPOSITION AND DISCUSSIONS    Escalation of care considered, and ultimately not performed: acute inpatient care management, however at this time, the patient is most appropriate for outpatient management.  Considered admission for pain control, considered admission for, however given reassuring workup pain well-controlled, patient appropriate for outpatient follow-up and discharge at this time.        FINAL DIAGNOSIS  1. Flank pain    2. Hematuria, unspecified type    3. Cystitis         Rocio RODRIGUEZ (Scrroseanne), am scribing for, and in the presence of, John Gomez M.D..    Electronically signed by: Rocio Simmons ([place]), 2/16/2024    IJohn M.D. personally performed the services described in this documentation, as scribed by Rocio Simmons in my presence, and it is both accurate and complete.      The note accurately reflects work and decisions made by me.  John Gomez M.D.  2/16/2024  9:29 PM

## 2024-02-18 ENCOUNTER — APPOINTMENT (OUTPATIENT)
Dept: RADIOLOGY | Facility: MEDICAL CENTER | Age: 34
DRG: 690 | End: 2024-02-18
Attending: STUDENT IN AN ORGANIZED HEALTH CARE EDUCATION/TRAINING PROGRAM
Payer: MEDICARE

## 2024-02-18 ENCOUNTER — HOSPITAL ENCOUNTER (INPATIENT)
Facility: MEDICAL CENTER | Age: 34
LOS: 4 days | DRG: 690 | End: 2024-02-22
Attending: STUDENT IN AN ORGANIZED HEALTH CARE EDUCATION/TRAINING PROGRAM | Admitting: STUDENT IN AN ORGANIZED HEALTH CARE EDUCATION/TRAINING PROGRAM
Payer: MEDICARE

## 2024-02-18 DIAGNOSIS — N20.0 KIDNEY STONE: ICD-10-CM

## 2024-02-18 DIAGNOSIS — M54.12 CERVICAL RADICULOPATHY: ICD-10-CM

## 2024-02-18 DIAGNOSIS — N12 PYELONEPHRITIS: ICD-10-CM

## 2024-02-18 DIAGNOSIS — R27.0 ATAXIA: ICD-10-CM

## 2024-02-18 DIAGNOSIS — J45.40 MODERATE PERSISTENT ASTHMA WITHOUT COMPLICATION: ICD-10-CM

## 2024-02-18 DIAGNOSIS — A41.9 SEPSIS, DUE TO UNSPECIFIED ORGANISM, UNSPECIFIED WHETHER ACUTE ORGAN DYSFUNCTION PRESENT (HCC): ICD-10-CM

## 2024-02-18 LAB
ALBUMIN SERPL BCP-MCNC: 4.1 G/DL (ref 3.2–4.9)
ALBUMIN/GLOB SERPL: 1.6 G/DL
ALP SERPL-CCNC: 70 U/L (ref 30–99)
ALT SERPL-CCNC: 25 U/L (ref 2–50)
ANION GAP SERPL CALC-SCNC: 14 MMOL/L (ref 7–16)
APPEARANCE UR: ABNORMAL
AST SERPL-CCNC: 12 U/L (ref 12–45)
BACTERIA #/AREA URNS HPF: ABNORMAL /HPF
BASOPHILS # BLD AUTO: 0.3 % (ref 0–1.8)
BASOPHILS # BLD: 0.02 K/UL (ref 0–0.12)
BILIRUB SERPL-MCNC: 0.9 MG/DL (ref 0.1–1.5)
BILIRUB UR QL STRIP.AUTO: ABNORMAL
BUN SERPL-MCNC: 10 MG/DL (ref 8–22)
CALCIUM ALBUM COR SERPL-MCNC: 8.7 MG/DL (ref 8.5–10.5)
CALCIUM SERPL-MCNC: 8.8 MG/DL (ref 8.5–10.5)
CHLORIDE SERPL-SCNC: 107 MMOL/L (ref 96–112)
CO2 SERPL-SCNC: 16 MMOL/L (ref 20–33)
COLOR UR: ABNORMAL
CREAT SERPL-MCNC: 0.9 MG/DL (ref 0.5–1.4)
EOSINOPHIL # BLD AUTO: 0.03 K/UL (ref 0–0.51)
EOSINOPHIL NFR BLD: 0.4 % (ref 0–6.9)
EPI CELLS #/AREA URNS HPF: ABNORMAL /HPF
ERYTHROCYTE [DISTWIDTH] IN BLOOD BY AUTOMATED COUNT: 41.9 FL (ref 35.9–50)
FLUAV RNA SPEC QL NAA+PROBE: NEGATIVE
FLUBV RNA SPEC QL NAA+PROBE: NEGATIVE
GFR SERPLBLD CREATININE-BSD FMLA CKD-EPI: 86 ML/MIN/1.73 M 2
GLOBULIN SER CALC-MCNC: 2.5 G/DL (ref 1.9–3.5)
GLUCOSE SERPL-MCNC: 127 MG/DL (ref 65–99)
GLUCOSE UR STRIP.AUTO-MCNC: NEGATIVE MG/DL
HCT VFR BLD AUTO: 36.3 % (ref 37–47)
HGB BLD-MCNC: 12.9 G/DL (ref 12–16)
IMM GRANULOCYTES # BLD AUTO: 0.03 K/UL (ref 0–0.11)
IMM GRANULOCYTES NFR BLD AUTO: 0.4 % (ref 0–0.9)
KETONES UR STRIP.AUTO-MCNC: 15 MG/DL
LACTATE SERPL-SCNC: 1.7 MMOL/L (ref 0.5–2)
LEUKOCYTE ESTERASE UR QL STRIP.AUTO: ABNORMAL
LYMPHOCYTES # BLD AUTO: 0.96 K/UL (ref 1–4.8)
LYMPHOCYTES NFR BLD: 13.1 % (ref 22–41)
MCH RBC QN AUTO: 32.3 PG (ref 27–33)
MCHC RBC AUTO-ENTMCNC: 35.5 G/DL (ref 32.2–35.5)
MCV RBC AUTO: 90.8 FL (ref 81.4–97.8)
MICRO URNS: ABNORMAL
MONOCYTES # BLD AUTO: 0.5 K/UL (ref 0–0.85)
MONOCYTES NFR BLD AUTO: 6.8 % (ref 0–13.4)
NEUTROPHILS # BLD AUTO: 5.78 K/UL (ref 1.82–7.42)
NEUTROPHILS NFR BLD: 79 % (ref 44–72)
NITRITE UR QL STRIP.AUTO: POSITIVE
NRBC # BLD AUTO: 0 K/UL
NRBC BLD-RTO: 0 /100 WBC (ref 0–0.2)
PH UR STRIP.AUTO: 6 [PH] (ref 5–8)
PLATELET # BLD AUTO: 119 K/UL (ref 164–446)
PMV BLD AUTO: 11.7 FL (ref 9–12.9)
POTASSIUM SERPL-SCNC: 3.4 MMOL/L (ref 3.6–5.5)
PROT SERPL-MCNC: 6.6 G/DL (ref 6–8.2)
PROT UR QL STRIP: 100 MG/DL
RBC # BLD AUTO: 4 M/UL (ref 4.2–5.4)
RBC # URNS HPF: ABNORMAL /HPF
RBC UR QL AUTO: ABNORMAL
RSV RNA SPEC QL NAA+PROBE: NEGATIVE
SARS-COV-2 RNA RESP QL NAA+PROBE: NOTDETECTED
SODIUM SERPL-SCNC: 137 MMOL/L (ref 135–145)
SP GR UR STRIP.AUTO: 1.02
UROBILINOGEN UR STRIP.AUTO-MCNC: 2 MG/DL
WBC # BLD AUTO: 7.3 K/UL (ref 4.8–10.8)
WBC #/AREA URNS HPF: ABNORMAL /HPF

## 2024-02-18 PROCEDURE — 700102 HCHG RX REV CODE 250 W/ 637 OVERRIDE(OP): Mod: UD | Performed by: STUDENT IN AN ORGANIZED HEALTH CARE EDUCATION/TRAINING PROGRAM

## 2024-02-18 PROCEDURE — 87086 URINE CULTURE/COLONY COUNT: CPT

## 2024-02-18 PROCEDURE — 71045 X-RAY EXAM CHEST 1 VIEW: CPT

## 2024-02-18 PROCEDURE — A9270 NON-COVERED ITEM OR SERVICE: HCPCS | Performed by: STUDENT IN AN ORGANIZED HEALTH CARE EDUCATION/TRAINING PROGRAM

## 2024-02-18 PROCEDURE — 87040 BLOOD CULTURE FOR BACTERIA: CPT | Mod: 91

## 2024-02-18 PROCEDURE — 80053 COMPREHEN METABOLIC PANEL: CPT

## 2024-02-18 PROCEDURE — 700105 HCHG RX REV CODE 258: Performed by: STUDENT IN AN ORGANIZED HEALTH CARE EDUCATION/TRAINING PROGRAM

## 2024-02-18 PROCEDURE — 96375 TX/PRO/DX INJ NEW DRUG ADDON: CPT

## 2024-02-18 PROCEDURE — 700111 HCHG RX REV CODE 636 W/ 250 OVERRIDE (IP): Mod: JZ | Performed by: STUDENT IN AN ORGANIZED HEALTH CARE EDUCATION/TRAINING PROGRAM

## 2024-02-18 PROCEDURE — 770001 HCHG ROOM/CARE - MED/SURG/GYN PRIV*

## 2024-02-18 PROCEDURE — 700105 HCHG RX REV CODE 258: Mod: UD | Performed by: STUDENT IN AN ORGANIZED HEALTH CARE EDUCATION/TRAINING PROGRAM

## 2024-02-18 PROCEDURE — 99285 EMERGENCY DEPT VISIT HI MDM: CPT

## 2024-02-18 PROCEDURE — 81001 URINALYSIS AUTO W/SCOPE: CPT

## 2024-02-18 PROCEDURE — 85025 COMPLETE CBC W/AUTO DIFF WBC: CPT

## 2024-02-18 PROCEDURE — 83605 ASSAY OF LACTIC ACID: CPT

## 2024-02-18 PROCEDURE — 700102 HCHG RX REV CODE 250 W/ 637 OVERRIDE(OP): Performed by: STUDENT IN AN ORGANIZED HEALTH CARE EDUCATION/TRAINING PROGRAM

## 2024-02-18 PROCEDURE — 700111 HCHG RX REV CODE 636 W/ 250 OVERRIDE (IP): Performed by: STUDENT IN AN ORGANIZED HEALTH CARE EDUCATION/TRAINING PROGRAM

## 2024-02-18 PROCEDURE — 96374 THER/PROPH/DIAG INJ IV PUSH: CPT

## 2024-02-18 PROCEDURE — 99223 1ST HOSP IP/OBS HIGH 75: CPT | Mod: AI | Performed by: STUDENT IN AN ORGANIZED HEALTH CARE EDUCATION/TRAINING PROGRAM

## 2024-02-18 PROCEDURE — 0241U HCHG SARS-COV-2 COVID-19 NFCT DS RESP RNA 4 TRGT ED POC: CPT

## 2024-02-18 PROCEDURE — 74176 CT ABD & PELVIS W/O CONTRAST: CPT

## 2024-02-18 PROCEDURE — A9270 NON-COVERED ITEM OR SERVICE: HCPCS | Mod: UD | Performed by: STUDENT IN AN ORGANIZED HEALTH CARE EDUCATION/TRAINING PROGRAM

## 2024-02-18 PROCEDURE — 36415 COLL VENOUS BLD VENIPUNCTURE: CPT

## 2024-02-18 RX ORDER — ACETAMINOPHEN 325 MG/1
650 TABLET ORAL ONCE
Status: COMPLETED | OUTPATIENT
Start: 2024-02-18 | End: 2024-02-18

## 2024-02-18 RX ORDER — POTASSIUM CHLORIDE 20 MEQ/1
40 TABLET, EXTENDED RELEASE ORAL
Status: DISPENSED | OUTPATIENT
Start: 2024-02-18 | End: 2024-02-18

## 2024-02-18 RX ORDER — SODIUM CHLORIDE 9 MG/ML
INJECTION, SOLUTION INTRAVENOUS CONTINUOUS
Status: DISCONTINUED | OUTPATIENT
Start: 2024-02-18 | End: 2024-02-19

## 2024-02-18 RX ORDER — TIZANIDINE 4 MG/1
4 TABLET ORAL 2 TIMES DAILY
Status: DISCONTINUED | OUTPATIENT
Start: 2024-02-18 | End: 2024-02-22 | Stop reason: HOSPADM

## 2024-02-18 RX ORDER — SODIUM CHLORIDE 9 MG/ML
30 INJECTION, SOLUTION INTRAVENOUS ONCE
Status: COMPLETED | OUTPATIENT
Start: 2024-02-18 | End: 2024-02-18

## 2024-02-18 RX ORDER — ONDANSETRON 2 MG/ML
4 INJECTION INTRAMUSCULAR; INTRAVENOUS ONCE
Status: COMPLETED | OUTPATIENT
Start: 2024-02-18 | End: 2024-02-18

## 2024-02-18 RX ORDER — LORAZEPAM 2 MG/ML
2 INJECTION INTRAMUSCULAR EVERY 4 HOURS PRN
Status: DISCONTINUED | OUTPATIENT
Start: 2024-02-18 | End: 2024-02-20

## 2024-02-18 RX ORDER — KETOROLAC TROMETHAMINE 15 MG/ML
15 INJECTION, SOLUTION INTRAMUSCULAR; INTRAVENOUS ONCE
Status: COMPLETED | OUTPATIENT
Start: 2024-02-18 | End: 2024-02-18

## 2024-02-18 RX ORDER — LAMOTRIGINE 100 MG/1
50 TABLET ORAL 2 TIMES DAILY
Status: DISCONTINUED | OUTPATIENT
Start: 2024-02-18 | End: 2024-02-22 | Stop reason: HOSPADM

## 2024-02-18 RX ORDER — SODIUM CHLORIDE 1 G/1
1 TABLET ORAL 3 TIMES DAILY
Status: DISCONTINUED | OUTPATIENT
Start: 2024-02-18 | End: 2024-02-22 | Stop reason: HOSPADM

## 2024-02-18 RX ORDER — ZIPRASIDONE HYDROCHLORIDE 40 MG/1
40 CAPSULE ORAL
Status: DISCONTINUED | OUTPATIENT
Start: 2024-02-18 | End: 2024-02-22 | Stop reason: HOSPADM

## 2024-02-18 RX ORDER — ENOXAPARIN SODIUM 100 MG/ML
40 INJECTION SUBCUTANEOUS DAILY
Status: DISCONTINUED | OUTPATIENT
Start: 2024-02-18 | End: 2024-02-22 | Stop reason: HOSPADM

## 2024-02-18 RX ORDER — ACETAMINOPHEN 325 MG/1
650 TABLET ORAL EVERY 6 HOURS PRN
Status: DISCONTINUED | OUTPATIENT
Start: 2024-02-18 | End: 2024-02-22 | Stop reason: HOSPADM

## 2024-02-18 RX ORDER — METOPROLOL SUCCINATE 25 MG/1
25 TABLET, EXTENDED RELEASE ORAL DAILY
Status: DISCONTINUED | OUTPATIENT
Start: 2024-02-19 | End: 2024-02-22 | Stop reason: HOSPADM

## 2024-02-18 RX ORDER — CEFTRIAXONE 1 G/1
1000 INJECTION, POWDER, FOR SOLUTION INTRAMUSCULAR; INTRAVENOUS ONCE
Status: DISCONTINUED | OUTPATIENT
Start: 2024-02-18 | End: 2024-02-18

## 2024-02-18 RX ADMIN — ENOXAPARIN SODIUM 40 MG: 100 INJECTION SUBCUTANEOUS at 22:24

## 2024-02-18 RX ADMIN — POTASSIUM CHLORIDE 40 MEQ: 1500 TABLET, EXTENDED RELEASE ORAL at 22:24

## 2024-02-18 RX ADMIN — LAMOTRIGINE 50 MG: 100 TABLET ORAL at 22:24

## 2024-02-18 RX ADMIN — CEFEPIME 2 G: 2 INJECTION, POWDER, FOR SOLUTION INTRAVENOUS at 22:24

## 2024-02-18 RX ADMIN — SODIUM CHLORIDE 1641 ML: 9 INJECTION, SOLUTION INTRAVENOUS at 17:23

## 2024-02-18 RX ADMIN — SODIUM CHLORIDE: 9 INJECTION, SOLUTION INTRAVENOUS at 22:32

## 2024-02-18 RX ADMIN — KETOROLAC TROMETHAMINE 15 MG: 15 INJECTION, SOLUTION INTRAMUSCULAR; INTRAVENOUS at 17:10

## 2024-02-18 RX ADMIN — TIZANIDINE 4 MG: 4 TABLET ORAL at 22:25

## 2024-02-18 RX ADMIN — SODIUM CHLORIDE 1 G: 1 TABLET ORAL at 22:24

## 2024-02-18 RX ADMIN — ONDANSETRON 4 MG: 2 INJECTION INTRAMUSCULAR; INTRAVENOUS at 18:38

## 2024-02-18 RX ADMIN — ZIPRASIDONE HYDROCHLORIDE 40 MG: 40 CAPSULE ORAL at 22:25

## 2024-02-18 RX ADMIN — LORAZEPAM 2 MG: 2 INJECTION INTRAMUSCULAR; INTRAVENOUS at 22:37

## 2024-02-18 RX ADMIN — ACETAMINOPHEN 650 MG: 325 TABLET, FILM COATED ORAL at 17:50

## 2024-02-18 ASSESSMENT — ENCOUNTER SYMPTOMS
PSYCHIATRIC NEGATIVE: 1
FEVER: 1
CARDIOVASCULAR NEGATIVE: 1
CHILLS: 1
MUSCULOSKELETAL NEGATIVE: 1
EYES NEGATIVE: 1
RESPIRATORY NEGATIVE: 1

## 2024-02-18 ASSESSMENT — PAIN DESCRIPTION - PAIN TYPE: TYPE: ACUTE PAIN

## 2024-02-18 ASSESSMENT — FIBROSIS 4 INDEX
FIB4 SCORE: 0.69
FIB4 SCORE: .6857142857142857143

## 2024-02-19 LAB
ANION GAP SERPL CALC-SCNC: 11 MMOL/L (ref 7–16)
ANISOCYTOSIS BLD QL SMEAR: ABNORMAL
BASOPHILS # BLD AUTO: 0 % (ref 0–1.8)
BASOPHILS # BLD: 0 K/UL (ref 0–0.12)
BUN SERPL-MCNC: 10 MG/DL (ref 8–22)
CALCIUM SERPL-MCNC: 8.3 MG/DL (ref 8.5–10.5)
CHLORIDE SERPL-SCNC: 111 MMOL/L (ref 96–112)
CO2 SERPL-SCNC: 13 MMOL/L (ref 20–33)
CREAT SERPL-MCNC: 0.55 MG/DL (ref 0.5–1.4)
EOSINOPHIL # BLD AUTO: 0.05 K/UL (ref 0–0.51)
EOSINOPHIL NFR BLD: 0.9 % (ref 0–6.9)
ERYTHROCYTE [DISTWIDTH] IN BLOOD BY AUTOMATED COUNT: 41 FL (ref 35.9–50)
GFR SERPLBLD CREATININE-BSD FMLA CKD-EPI: 123 ML/MIN/1.73 M 2
GLUCOSE SERPL-MCNC: 129 MG/DL (ref 65–99)
HCT VFR BLD AUTO: 31.1 % (ref 37–47)
HGB BLD-MCNC: 11.2 G/DL (ref 12–16)
LYMPHOCYTES # BLD AUTO: 1.39 K/UL (ref 1–4.8)
LYMPHOCYTES NFR BLD: 26.7 % (ref 22–41)
MANUAL DIFF BLD: NORMAL
MCH RBC QN AUTO: 32.4 PG (ref 27–33)
MCHC RBC AUTO-ENTMCNC: 36 G/DL (ref 32.2–35.5)
MCV RBC AUTO: 89.9 FL (ref 81.4–97.8)
MICROCYTES BLD QL SMEAR: ABNORMAL
MONOCYTES # BLD AUTO: 0.36 K/UL (ref 0–0.85)
MONOCYTES NFR BLD AUTO: 6.9 % (ref 0–13.4)
MORPHOLOGY BLD-IMP: NORMAL
NEUTROPHILS # BLD AUTO: 3.41 K/UL (ref 1.82–7.42)
NEUTROPHILS NFR BLD: 65.5 % (ref 44–72)
NRBC # BLD AUTO: 0 K/UL
NRBC BLD-RTO: 0 /100 WBC (ref 0–0.2)
PLATELET # BLD AUTO: 107 K/UL (ref 164–446)
PLATELET BLD QL SMEAR: NORMAL
PMV BLD AUTO: 11.6 FL (ref 9–12.9)
POTASSIUM SERPL-SCNC: 4.1 MMOL/L (ref 3.6–5.5)
RBC # BLD AUTO: 3.46 M/UL (ref 4.2–5.4)
RBC BLD AUTO: PRESENT
SODIUM SERPL-SCNC: 135 MMOL/L (ref 135–145)
WBC # BLD AUTO: 5.2 K/UL (ref 4.8–10.8)

## 2024-02-19 PROCEDURE — 770006 HCHG ROOM/CARE - MED/SURG/GYN SEMI*

## 2024-02-19 PROCEDURE — 700102 HCHG RX REV CODE 250 W/ 637 OVERRIDE(OP): Performed by: HOSPITALIST

## 2024-02-19 PROCEDURE — 700111 HCHG RX REV CODE 636 W/ 250 OVERRIDE (IP): Performed by: STUDENT IN AN ORGANIZED HEALTH CARE EDUCATION/TRAINING PROGRAM

## 2024-02-19 PROCEDURE — 36415 COLL VENOUS BLD VENIPUNCTURE: CPT

## 2024-02-19 PROCEDURE — 85027 COMPLETE CBC AUTOMATED: CPT

## 2024-02-19 PROCEDURE — A9270 NON-COVERED ITEM OR SERVICE: HCPCS | Performed by: STUDENT IN AN ORGANIZED HEALTH CARE EDUCATION/TRAINING PROGRAM

## 2024-02-19 PROCEDURE — 700105 HCHG RX REV CODE 258: Performed by: HOSPITALIST

## 2024-02-19 PROCEDURE — 99233 SBSQ HOSP IP/OBS HIGH 50: CPT | Performed by: HOSPITALIST

## 2024-02-19 PROCEDURE — 700105 HCHG RX REV CODE 258: Performed by: STUDENT IN AN ORGANIZED HEALTH CARE EDUCATION/TRAINING PROGRAM

## 2024-02-19 PROCEDURE — 80048 BASIC METABOLIC PNL TOTAL CA: CPT

## 2024-02-19 PROCEDURE — 85007 BL SMEAR W/DIFF WBC COUNT: CPT

## 2024-02-19 PROCEDURE — A9270 NON-COVERED ITEM OR SERVICE: HCPCS | Performed by: HOSPITALIST

## 2024-02-19 PROCEDURE — 700102 HCHG RX REV CODE 250 W/ 637 OVERRIDE(OP): Performed by: STUDENT IN AN ORGANIZED HEALTH CARE EDUCATION/TRAINING PROGRAM

## 2024-02-19 RX ORDER — SODIUM BICARBONATE 650 MG/1
1300 TABLET ORAL 3 TIMES DAILY
Status: DISCONTINUED | OUTPATIENT
Start: 2024-02-19 | End: 2024-02-22 | Stop reason: HOSPADM

## 2024-02-19 RX ORDER — SODIUM CHLORIDE, SODIUM LACTATE, POTASSIUM CHLORIDE, CALCIUM CHLORIDE 600; 310; 30; 20 MG/100ML; MG/100ML; MG/100ML; MG/100ML
INJECTION, SOLUTION INTRAVENOUS CONTINUOUS
Status: DISCONTINUED | OUTPATIENT
Start: 2024-02-19 | End: 2024-02-22 | Stop reason: HOSPADM

## 2024-02-19 RX ORDER — POTASSIUM CHLORIDE 20 MEQ/1
40 TABLET, EXTENDED RELEASE ORAL
Status: COMPLETED | OUTPATIENT
Start: 2024-02-19 | End: 2024-02-19

## 2024-02-19 RX ORDER — OXYCODONE HYDROCHLORIDE 5 MG/1
5 TABLET ORAL EVERY 4 HOURS PRN
Status: DISCONTINUED | OUTPATIENT
Start: 2024-02-19 | End: 2024-02-22 | Stop reason: HOSPADM

## 2024-02-19 RX ORDER — TRAMADOL HYDROCHLORIDE 50 MG/1
50 TABLET ORAL EVERY 6 HOURS PRN
Status: DISCONTINUED | OUTPATIENT
Start: 2024-02-19 | End: 2024-02-22 | Stop reason: HOSPADM

## 2024-02-19 RX ADMIN — ZIPRASIDONE HYDROCHLORIDE 40 MG: 40 CAPSULE ORAL at 20:55

## 2024-02-19 RX ADMIN — ENOXAPARIN SODIUM 40 MG: 100 INJECTION SUBCUTANEOUS at 16:27

## 2024-02-19 RX ADMIN — SODIUM CHLORIDE: 9 INJECTION, SOLUTION INTRAVENOUS at 05:06

## 2024-02-19 RX ADMIN — LAMOTRIGINE 50 MG: 100 TABLET ORAL at 20:55

## 2024-02-19 RX ADMIN — ACETAMINOPHEN 650 MG: 325 TABLET, FILM COATED ORAL at 16:27

## 2024-02-19 RX ADMIN — SODIUM BICARBONATE 1300 MG: 650 TABLET ORAL at 18:16

## 2024-02-19 RX ADMIN — SODIUM CHLORIDE, POTASSIUM CHLORIDE, SODIUM LACTATE AND CALCIUM CHLORIDE: 600; 310; 30; 20 INJECTION, SOLUTION INTRAVENOUS at 18:19

## 2024-02-19 RX ADMIN — SODIUM CHLORIDE 1 G: 1 TABLET ORAL at 14:03

## 2024-02-19 RX ADMIN — SODIUM CHLORIDE: 9 INJECTION, SOLUTION INTRAVENOUS at 16:08

## 2024-02-19 RX ADMIN — CEFEPIME 2 G: 2 INJECTION, POWDER, FOR SOLUTION INTRAVENOUS at 09:45

## 2024-02-19 RX ADMIN — METOPROLOL SUCCINATE 25 MG: 25 TABLET, EXTENDED RELEASE ORAL at 05:05

## 2024-02-19 RX ADMIN — LORAZEPAM 2 MG: 2 INJECTION INTRAMUSCULAR; INTRAVENOUS at 20:04

## 2024-02-19 RX ADMIN — TIZANIDINE 4 MG: 4 TABLET ORAL at 09:45

## 2024-02-19 RX ADMIN — CEFEPIME 2 G: 2 INJECTION, POWDER, FOR SOLUTION INTRAVENOUS at 20:55

## 2024-02-19 RX ADMIN — SODIUM CHLORIDE 1 G: 1 TABLET ORAL at 05:05

## 2024-02-19 RX ADMIN — TIZANIDINE 4 MG: 4 TABLET ORAL at 20:55

## 2024-02-19 RX ADMIN — OXYCODONE 5 MG: 5 TABLET ORAL at 18:16

## 2024-02-19 RX ADMIN — SODIUM CHLORIDE 1 G: 1 TABLET ORAL at 20:54

## 2024-02-19 RX ADMIN — LAMOTRIGINE 50 MG: 100 TABLET ORAL at 09:45

## 2024-02-19 RX ADMIN — POTASSIUM CHLORIDE 40 MEQ: 1500 TABLET, EXTENDED RELEASE ORAL at 00:42

## 2024-02-19 RX ADMIN — LORAZEPAM 2 MG: 2 INJECTION INTRAMUSCULAR; INTRAVENOUS at 08:56

## 2024-02-19 ASSESSMENT — ENCOUNTER SYMPTOMS
NERVOUS/ANXIOUS: 0
FLANK PAIN: 1
BLURRED VISION: 0
SPUTUM PRODUCTION: 0
COUGH: 0
HEARTBURN: 0
HEADACHES: 0
HEMOPTYSIS: 0
DOUBLE VISION: 0
PALPITATIONS: 0
NAUSEA: 0
VOMITING: 0

## 2024-02-19 ASSESSMENT — PAIN DESCRIPTION - PAIN TYPE
TYPE: ACUTE PAIN

## 2024-02-19 NOTE — ED NOTES
Bedside report given to night RN.  POC discussed with patient. Call light within reach, all needs addressed at this time.       Fall risk interventions in place: In place   Continuous monitoring: tele, SpO2, B/P  IVF/IV medications: NS  Oxygen: N/a  Bedside sitter: N/a  Isolation: N/a

## 2024-02-19 NOTE — PROGRESS NOTES
0720  Patient's in bed. Bedside report received from NEEL Echols RN at the beginning of the shift.    0825 Patient's sitting up in bed. Educated on the importance/use of IS at least 10x every hourl while awake, able to reach 4000. Rates flank pain/abdominal pain 1/10. Ice pack given. Fall protocol in effect. Call light within reach. Reminded patient to call for assist. Assessment completed. No distress noted. Plan of care reviewed with the patient. Verbalized understanding.    0856 Medicated with Valium (see MAR) for c/o's nausea, no emesis noted.     1120 Patient's in bed. No distress noted.    1330 Patient's in bed. No distress noted.    1525 Patient's in bed. No distress noted.    1627 Medicated with Tylenol (see MAR) for c/o's abdominal and right flank pain, rates pain 3/10. Ice pack given.    1742 Dr Calabrese visited. POC discussed with the patient. New order received and acknowledged (see MAR).     1802 New order received and acknowledged from Dr Calabrese (see nursing communication).    1805 New order received and acknowledged from Dr Calabrese - labs.     1818 Medicated with Oxycodone (see MAR) for c/o's abdominal and right flank pain, rates pain 6/10.    1821 String/stent removed as per order.     1925  Patient's in bed. No changes in status. Bedside report given to Tarik SHAIKH RN ([place]).

## 2024-02-19 NOTE — ED NOTES
Assumed care of patient, patient bedside report received from RN [place] and Jorge . Pt AAO X 4 , respirations even and unlabored, on room air . Introduced self as pt RN, POC discussed, call light in reach, standard Fall risk interventions in place.

## 2024-02-19 NOTE — ED PROVIDER NOTES
ED Provider Note    CHIEF COMPLAINT  Chief Complaint   Patient presents with    Fever    Nausea    Weakness    Dizziness     PT is having an increase in her starting this morning which she's states was 104.7. Pt is also having progressive weakness, nausea, and dizziness which she says started on Friday.       EXTERNAL RECORDS REVIEWED  Operative report from lithotripsy 2/12/2024    HPI/ROS  LIMITATION TO HISTORY   Select: : None  OUTSIDE HISTORIAN(S):  None    Kristin Balderrama is a 33 y.o. female with an extensive past medical history presenting to the emergency department for fevers, chills, nausea.  Recently underwent lithotripsy and stent placement for right nephrolithiasis.  Has been treated with Bactrim, followed by urologist Dr. Roberson.  Today says that she has not been able to control her fever.  Complaining of right-sided flank discomfort, nausea, vomiting, dysuria.      PAST MEDICAL HISTORY   has a past medical history of Abdominal pain, Anginal syndrome, Apnea, sleep, Arrhythmia, Arthritis, ASTHMA, Back pain, Borderline personality disorder ([facility]), Breath shortness, Bronchitis (02/08/2022), Cardiac arrhythmia, Chickenpox, Chronic UTI (09/18/2010), Cough, Daytime sleepiness, Dental disorder (03/08/2021), Depression, Diabetes ([facility]), Diarrhea, Disorder of thyroid, Fall, Fatigue, Frequent headaches, Gasping for breath, Gynecological disorder, Headache(784.0), Heart burn, Heart murmur, History of falling, Indigestion, Migraine, 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[facility]), 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    SURGICAL HISTORY   has a past surgical history that includes neuro dest facet l/s w/ig sngl (04/21/2015); recovery (01/27/2016); [place] by laparoscopy (08/29/2010); lumbar fusion anterior (08/21/2012); other cardiac surgery (01/2016); tonsillectomy; bowel stimulator insertion (07/13/2016); gastroscopy with balloon dilatation (N/A, 01/04/2017); muscle biopsy (Right, 01/26/2017); other abdominal surgery; laminotomy; bowel stimulator insertion (03/10/2021); lap,diagnostic abdomen (02/14/2022); ovarian cystectomy (Right, 02/14/2022); percut fix prox/neck femur fx (Left, 01/28/2023); inguinal hernia laparoscopic (Right, 07/21/2023); hernia repair (Right, 07/21/2023); cystoscopy,insert ureteral stent (Right, 2/12/2024); cysto/uretero/pyeloscopy, dx (Right, 2/12/2024); and lasertripsy (Right, 2/12/2024).    FAMILY HISTORY  Family History   Problem Relation Age of Onset    Hypertension Mother     Sleep Apnea Mother     Heart Disease Mother     Other Mother         hypothryod    Hypertension Maternal Uncle     Heart Disease Maternal Grandmother     Hypertension Maternal Grandmother     No Known Problems Sister     Other Sister         Narcolepsy;fibromyalsia;bone;nerve    Genitourinary () Problems Sister         endometriosis       SOCIAL HISTORY  Social History     Tobacco Use    Smoking status: Never    Smokeless tobacco: Never   Vaping Use    Vaping Use: Never used   Substance and Sexual Activity    Alcohol use: No     Alcohol/week: 0.0 oz    Drug use: Never     Frequency: 7.0 times per week    Sexual activity: Not Currently     Birth control/protection: Implant       CURRENT MEDICATIONS  Home Medications       Reviewed by Ulisses Mckeon (Pharmacy Tech) on 02/18/24 at 1802  Med List Status: Complete     Medication Last Dose Status   Adalimumab 80 MG/0.8ML Pen-injector Kit 4 weeks ago Active   diphenhydrAMINE (BENADRYL) 25 MG Tab 2/18/2024 Active  "  docusate sodium 250 MG Cap 2/18/2024 Active   etonogestrel (NEXPLANON) 68 MG Implant implant continuous Active   fluconazole (DIFLUCAN) 150 MG tablet 2/14/2024 Active   Galcanezumab-gnlm (EMGALITY) 120 MG/ML Solution Auto-injector 1/27/2024 Active   ivabradine (CORLANOR) 7.5 MG Tab tablet 2/18/2024 Active   lamoTRIgine (LAMICTAL) 25 MG Tab 2/18/2024 Active   Melatonin 10 MG Tab 2/17/2024 Active   metoprolol SR (TOPROL XL) 25 MG TABLET SR 24 HR 2/18/2024 Active   nitrofurantoin (MACROBID) 100 MG Cap 2/18/2024 Active   omeprazole (PRILOSEC) 20 MG delayed-release capsule 2/16/2024 Active   ondansetron (ZOFRAN ODT) 4 MG TABLET DISPERSIBLE 2/18/2024 Active   sodium chloride (SALT) 1 GM Tab 2/18/2024 Active   tizanidine (ZANAFLEX) 4 MG Tab 2/18/2024 Active   topiramate (TOPAMAX) 50 MG tablet 2/18/2024 Active   ziprasidone (GEODON) 40 MG Cap 2/17/2024 Active                    ALLERGIES  Allergies   Allergen Reactions    Cefdinir Shortness of Breath and Itching     Tolerated 1/18/17  Tolerates ceftriaxone  Tolerated augmentin 8/2019     Depakote [Divalproex Sodium] Unspecified     Muscle spasms/muscle pain and weakness      Doxycycline Anaphylaxis and Vomiting     Other reaction(s): pustules/blisters  Other reaction(s): stomach pain    Montelukast [Singulair] Unspecified     Cardiac effusion    Vancomycin Itching     Pt becomes flushed in face and chest.   RXN=7/10/16    Wound Dressing Adhesive Rash     By pt report-\"removes skin totally off\"    Amitriptyline Unspecified     Headaches      Amoxicillin Rash          Aripiprazole Unspecified    Aripiprazole [Abilify] Unspecified     Headaches/muscle twitching      Clindamycin Nausea         Other reaction(s): nausea stomach pain    Depakote [Divalproex Sodium]     Erythromycin Rash     .  Other reaction(s): nausea stomach pain    Flomax [Tamsulosin Hydrochloride] Swelling    Hydromorphone      Other reaction(s): vomiting    Levaquin Unspecified     Severe muscle cramps in " "legs  RXN=unknown  Other reaction(s): leg muscle cramps    Metformin Unspecified     Increased lactic acid     Other reaction(s): itching and rash/nausea vomiting    Tamsulosin Swelling     Swelling of legs    Tape Rash     Tears skin off  coban with Tegaderm tape ok intermittently  RXN=ongoing    Ampicillin Rash     Pt reports that she received a rash     Ciprofloxacin Rash          Keflex Rash     Pt states she gets a rash with this medication  Tolerates ceftriaxone  Can take with Benadryl    Levofloxacin Unspecified     Leg muscle cramps    Metronidazole Rash     \"Vision problems\"  Other reaction(s): vision problems    Montelukast     Penicillins Hives     Can take with Benadryl    Sulfa Drugs Itching, Myalgia and Hives     Muscle pain and weakness  Other reaction(s): unknown    Valproic Acid Rash     .       PHYSICAL EXAM  VITAL SIGNS: BP (!) 161/89   Pulse 85   Temp 37.2 °C (98.9 °F) (Oral)   Resp 20   Ht 1.626 m (5' 4\")   Wt 119 kg (262 lb 5.6 oz)   SpO2 96%   BMI 45.03 kg/m²    General: Uncomfortable appearing non-toxic, no acute distress, obese body habitus  Neuro: oriented x 3, moving all extremities.   HEENT:   - Head: Normocephalic, atraumatic  - Eyes: PERRL  - Ears/Nose: normal external nose and ears  - Mouth: moist mucosal membranes  Resp: clear to auscultation, no increased work of breathing  CV: Regular rate and rhythm  Abd: Soft, non-tender, non-distended  : Right CVA tenderness to percussion  Extremities: No peripheral edema  Psych: lucid and conversational         DIAGNOSTIC STUDIES / PROCEDURES    EKG  My independent EKG interpretation:  Results for orders placed or performed during the hospital encounter of 12/26/23   EKG   Result Value Ref Range    Report       [facility]    Test Date:  2023-12-26  Pt Name:    HARLEY DE LA CRUZ              Department: ER  MRN:        [id number]                      Room:  Gender:     Female                       " Technician: [id number]  :        1990                   Requested By:ER TRIAGE PROTOCOL  Order #:    [id number]                    Reading MD: Brian Slater    Measurements  Intervals                                Axis  Rate:       98                           P:          39  GA:         135                          QRS:        3  QRSD:       81                           T:          -17  QT:         426  QTc:        545    Interpretive Statements  Sinus rhythm  Borderline T abnormalities, diffuse leads  Prolonged QT interval  Baseline wander in lead(s) V1,V2  Compared to ECG 2023 18:32:22  Sinus tachycardia no longer present  T-wave abnormality still present  Electronically Signed On 2023 12:47:19 PST by Brian Slater       *Note: Due to a large number of results and/or encounters for the requested time period, some results have not been displayed. A complete set of results can be found in Results Review.       LABS  Results for orders placed or performed during the hospital encounter of 24   Lactic Acid   Result Value Ref Range    Lactic Acid 1.7 0.5 - 2.0 mmol/L   CBC with Differential   Result Value Ref Range    WBC 7.3 4.8 - 10.8 K/uL    RBC 4.00 (L) 4.20 - 5.40 M/uL    Hemoglobin 12.9 12.0 - 16.0 g/dL    Hematocrit 36.3 (L) 37.0 - 47.0 %    MCV 90.8 81.4 - 97.8 fL    MCH 32.3 27.0 - 33.0 pg    MCHC 35.5 32.2 - 35.5 g/dL    RDW 41.9 35.9 - 50.0 fL    Platelet Count 119 (L) 164 - 446 K/uL    MPV 11.7 9.0 - 12.9 fL    Neutrophils-Polys 79.00 (H) 44.00 - 72.00 %    Lymphocytes 13.10 (L) 22.00 - 41.00 %    Monocytes 6.80 0.00 - 13.40 %    Eosinophils 0.40 0.00 - 6.90 %    Basophils 0.30 0.00 - 1.80 %    Immature Granulocytes 0.40 0.00 - 0.90 %    Nucleated RBC 0.00 0.00 - 0.20 /100 WBC    Neutrophils (Absolute) 5.78 1.82 - 7.42 K/uL    Lymphs (Absolute) 0.96 (L) 1.00 - 4.80 K/uL    Monos (Absolute) 0.50 0.00 - 0.85 K/uL    Eos (Absolute) 0.03 0.00 - 0.51 K/uL    Baso (Absolute) 0.02 0.00 - 0.12  K/uL    Immature Granulocytes (abs) 0.03 0.00 - 0.11 K/uL    NRBC (Absolute) 0.00 K/uL   Complete Metabolic Panel   Result Value Ref Range    Sodium 137 135 - 145 mmol/L    Potassium 3.4 (L) 3.6 - 5.5 mmol/L    Chloride 107 96 - 112 mmol/L    Co2 16 (L) 20 - 33 mmol/L    Anion Gap 14.0 7.0 - 16.0    Glucose 127 (H) 65 - 99 mg/dL    Bun 10 8 - 22 mg/dL    Creatinine 0.90 0.50 - 1.40 mg/dL    Calcium 8.8 8.5 - 10.5 mg/dL    Correct Calcium 8.7 8.5 - 10.5 mg/dL    AST(SGOT) 12 12 - 45 U/L    ALT(SGPT) 25 2 - 50 U/L    Alkaline Phosphatase 70 30 - 99 U/L    Total Bilirubin 0.9 0.1 - 1.5 mg/dL    Albumin 4.1 3.2 - 4.9 g/dL    Total Protein 6.6 6.0 - 8.2 g/dL    Globulin 2.5 1.9 - 3.5 g/dL    A-G Ratio 1.6 g/dL   Urinalysis    Specimen: Urine   Result Value Ref Range    Color DK Yellow     Character Cloudy (A)     Specific Gravity 1.023 <1.035    Ph 6.0 5.0 - 8.0    Glucose Negative Negative mg/dL    Ketones 15 (A) Negative mg/dL    Protein 100 (A) Negative mg/dL    Bilirubin Small (A) Negative    Urobilinogen, Urine 2.0 Negative    Nitrite Positive (A) Negative    Leukocyte Esterase Moderate (A) Negative    Occult Blood Large (A) Negative    Micro Urine Req Microscopic    ESTIMATED GFR   Result Value Ref Range    GFR (CKD-EPI) 86 >60 mL/min/1.73 m 2   URINE MICROSCOPIC (W/UA)   Result Value Ref Range    WBC 5-10 (A) /hpf    RBC 20-50 (A) /hpf    Bacteria Few (A) None /hpf    Epithelial Cells Moderate (A) /hpf   POC CoV-2, FLU A/B, RSV by PCR   Result Value Ref Range    POC Influenza A RNA, PCR Negative Negative    POC Influenza B RNA, PCR Negative Negative    POC RSV, by PCR Negative Negative    POC SARS-CoV-2, PCR NotDetected      *Note: Due to a large number of results and/or encounters for the requested time period, some results have not been displayed. A complete set of results can be found in Results Review.       RADIOLOGY  I have independently interpreted the diagnostic imaging associated with this visit and am  waiting the final reading from the radiologist.   My preliminary interpretation is as follows:   - Plain film of the chest without evidence of acute cardiopulmonary process  Radiologist interpretation:   CT-RENAL COLIC EVALUATION(A/P W/O)   Final Result         1.  Stable right ureteral stent. No hydronephrosis. Tiny nonobstructing right renal stones.   2.  No significant interval change. No new abnormality.               DX-CHEST-PORTABLE (1 VIEW)   Final Result      No acute cardiopulmonary abnormality.              MEDICAL DECISION MAKING    ED Observation Status? No; Patient does not meet criteria for ED Observation.     ED COURSE AND PLAN    Kristin Balderrama is a 33 y.o. female presenting to the emergency department for right-sided flank pain, nausea, vomiting.  Septic on arrival to the emergency department with tachycardia and fever.  CBC with no leukocytosis.  Chemistry revealing mild nongap acidosis, mild hypokalemia but renal function is preserved.  Urinalysis with evidence of urinary tract infection despite being on Bactrim since her recent procedure.      ---Pertinent ED Course---:    9:28 PM I reviewed the patient's old records in Epic, medication list, allergies, past medical history and performed a physical examination.     11:00 PM reevaluated patient heart rate and fever have improved since treatment with Tylenol, IV fluids.  She continues to be very symptomatic and does not feel comfortable going home.  Given urinalysis with evidence of urinary tract infection, initiated treatment with Rocephin.  Will treat as pyelonephritis, admit to the hospital for ongoing management and futher Resuscitation.      HYDRATION: Based on the patient's presentation of Sepsis the patient was given IV fluids. IV Hydration was used because oral hydration was not adequate alone. Upon recheck following hydration, the patient was  improved.      Procedures:      ----------------------------------------------------------------------------------  DISCUSSIONS    I have discussed management of the patient with the following physicians and ARIES's: Hospitalist    Discussion of management with other Hospitals in [place] or appropriate source(s):     Escalation of care considered, and ultimately not performed: Considered but no indication for urology consultation at this time    Barriers to care at this time, including but not limited to:     Decision tools and prescription drugs considered including, but not limited to: Antibiotic utilization    FINAL IMPRESSION    1. Pyelonephritis    2. Sepsis, due to unspecified organism, unspecified whether acute organ dysfunction present (HCC)        Current Discharge Medication List            DISPOSITION        Admission: The patient will be admitted for further evaluation and treatment. Discussed case with consultants and relayed all necessary information.        This chart was dictated using an electronic voice recognition software. The chart has been reviewed and edited but there is still possibility for dictation errors due to limitation of software.    Wesley Aranda,  2/18/2024

## 2024-02-19 NOTE — H&P
[facility] Medicine History & Physical Note    Date of Service  2/18/2024    Primary Care Physician  Torres Brody M.D.    Consultants  None    Code Status  Full Code    Chief Complaint  Chief Complaint   Patient presents with    Fever    Nausea    Weakness    Dizziness     PT is having an increase in her starting this morning which she's states was 104.7. Pt is also having progressive weakness, nausea, and dizziness which she says started on Friday.       History of Presenting Illness  Kristin Balderrama is a 33 y.o. female who presented 2/18/2024 with dizziness.    Patient with history of nephrolithiasis, recently had laser lithotripsy and ureteral stent placement on February 12 due to nephrolithiasis.    For the last 2 days, patient began to have progressive worsening generalized weakness, fevers and chills, vomiting, nausea, right-sided flank pain.  She checked her temperature today and found it to be 104 degrees.  Due to her continued symptoms, she decided to come back into ER for evaluation.    In ER, patient febrile tachycardic hypertensive.  CT renal showing intact right ureteral stent.  UA positive UTI.  Started on Rocephin and fluids and endorsed to medicine.    I discussed the plan of care with patient.    Review of Systems  Review of Systems   Constitutional:  Positive for chills and fever.   HENT: Negative.     Eyes: Negative.    Respiratory: Negative.     Cardiovascular: Negative.    Musculoskeletal: Negative.    Skin: Negative.    Psychiatric/Behavioral: Negative.         Past Medical History   has a past medical history of Abdominal pain, Anginal syndrome, Apnea, sleep, Arrhythmia, Arthritis, ASTHMA, Back pain, Borderline personality disorder (HCC), Breath shortness, Bronchitis (02/08/2022), Cardiac arrhythmia, Chickenpox, Chronic UTI (09/18/2010), Cough, Daytime sleepiness, Dental disorder (03/08/2021), Depression, Diabetes (HCC), Diarrhea, Disorder of thyroid, Fall, Fatigue, Frequent headaches,  Gasping for breath, Gynecological disorder, Headache(784.0), Heart burn, Heart murmur, History of falling, Indigestion, Migraine, Mitochondrial disease (HCC), Multiple personality disorder (HCC), Nausea, Obesity, Other fatigue (06/29/2020), Pain (08/15/2012), Painful joint, PCOS (polycystic ovarian syndrome), Pneumonia (2012 12/2020), POTS (postural orthostatic tachycardia syndrome), Psychosis (HCC), Ringing in ears, Scoliosis, Shortness of breath, Sinus tachycardia (10/31/2013), Sleep apnea, Snoring, Supraventricular tachycardia (4/10/2019), Tonsillitis, Transverse myelitis (HCC), Tuberculosis, Urinary bladder disorder, Urinary incontinence, Weakness, and Wears glasses.    Surgical History   has a past surgical history that includes neuro dest facet l/s w/ig sngl (04/21/2015); recovery (01/27/2016); [place] by laparoscopy (08/29/2010); lumbar fusion anterior (08/21/2012); other cardiac surgery (01/2016); tonsillectomy; bowel stimulator insertion (07/13/2016); gastroscopy with balloon dilatation (N/A, 01/04/2017); muscle biopsy (Right, 01/26/2017); other abdominal surgery; laminotomy; bowel stimulator insertion (03/10/2021); pr lap,diagnostic abdomen (02/14/2022); ovarian cystectomy (Right, 02/14/2022); pr percut fix prox/neck femur fx (Left, 01/28/2023); inguinal hernia laparoscopic (Right, 07/21/2023); hernia repair (Right, 07/21/2023); pr cystoscopy,insert ureteral stent (Right, 2/12/2024); pr cysto/uretero/pyeloscopy, dx (Right, 2/12/2024); and lasertripsy (Right, 2/12/2024).     Family History  family history includes Genitourinary () Problems in her sister; Heart Disease in her maternal grandmother and mother; Hypertension in her maternal grandmother, maternal uncle, and mother; No Known Problems in her sister; Other in her mother and sister; Sleep Apnea in her mother.   Family history reviewed with patient. There is no family history that is pertinent to the chief complaint.     Social History   reports that  "she has never smoked. She has never used smokeless tobacco. She reports that she does not drink alcohol and does not use drugs.    Allergies  Allergies   Allergen Reactions    Cefdinir Shortness of Breath and Itching     Tolerated 1/18/17  Tolerates ceftriaxone  Tolerated augmentin 8/2019     Depakote [Divalproex Sodium] Unspecified     Muscle spasms/muscle pain and weakness      Doxycycline Anaphylaxis and Vomiting     Other reaction(s): pustules/blisters  Other reaction(s): stomach pain    Montelukast [Singulair] Unspecified     Cardiac effusion    Vancomycin Itching     Pt becomes flushed in face and chest.   RXN=7/10/16    Wound Dressing Adhesive Rash     By pt report-\"removes skin totally off\"    Amitriptyline Unspecified     Headaches      Amoxicillin Rash          Aripiprazole Unspecified    Aripiprazole [Abilify] Unspecified     Headaches/muscle twitching      Clindamycin Nausea         Other reaction(s): nausea stomach pain    Depakote [Divalproex Sodium]     Erythromycin Rash     .  Other reaction(s): nausea stomach pain    Flomax [Tamsulosin Hydrochloride] Swelling    Hydromorphone      Other reaction(s): vomiting    Levaquin Unspecified     Severe muscle cramps in legs  RXN=unknown  Other reaction(s): leg muscle cramps    Metformin Unspecified     Increased lactic acid     Other reaction(s): itching and rash/nausea vomiting    Tamsulosin Swelling     Swelling of legs    Tape Rash     Tears skin off  coban with Tegaderm tape ok intermittently  RXN=ongoing    Ampicillin Rash     Pt reports that she received a rash     Ciprofloxacin Rash          Keflex Rash     Pt states she gets a rash with this medication  Tolerates ceftriaxone  Can take with Benadryl    Levofloxacin Unspecified     Leg muscle cramps    Metronidazole Rash     \"Vision problems\"  Other reaction(s): vision problems    Montelukast     Penicillins Hives     Can take with Benadryl    Sulfa Drugs Itching, Myalgia and Hives     Muscle pain and " weakness  Other reaction(s): unknown    Valproic Acid Rash     .       Medications  Prior to Admission Medications   Prescriptions Last Dose Informant Patient Reported? Taking?   Adalimumab 80 MG/0.8ML Pen-injector Kit 4 weeks ago at [facility] Patient Yes No   Sig: Inject 80 mg under the skin every 14 days.   Galcanezumab-gnlm (EMGALITY) 120 MG/ML Solution Auto-injector 1/27/2024 at [facility] Patient Yes No   Sig: Inject 120 mg under the skin every 30 (thirty) days.   Melatonin 10 MG Tab 2/17/2024 at  Patient Yes Yes   Sig: Take 10 mg by mouth at bedtime.   diphenhydrAMINE (BENADRYL) 25 MG Tab 2/18/2024 at am Patient Yes Yes   Sig: Take 25-50 mg by mouth 2 times a day. 25 mg in the morning, 50 mg in the evening   docusate sodium 250 MG Cap 2/18/2024 at am Patient Yes Yes   Sig: Take 250 mg by mouth 2 times a day.   etonogestrel (NEXPLANON) 68 MG Implant implant continuous at continuous Patient Yes No   Sig: Inject 1 Each under the skin see administration instructions. Pt reports she is not sure when she had this medications injected last, pt reports she still has it in her arm  Every 3 years to change   fluconazole (DIFLUCAN) 150 MG tablet 2/14/2024 at [facility] Patient Yes No   Sig: Take 150 mg by mouth every Wednesday.   ivabradine (CORLANOR) 7.5 MG Tab tablet 2/18/2024 at am Patient No Yes   Sig: Take 1 Tablet by mouth 2 times a day with meals.   lamoTRIgine (LAMICTAL) 25 MG Tab 2/18/2024 at am Patient Yes Yes   Sig: Take 50 mg by mouth 2 times a day. 50 mg = 2 tablets   metoprolol SR (TOPROL XL) 25 MG TABLET SR 24 HR 2/18/2024 at am Patient No Yes   Sig: Take 1 Tablet by mouth every day.   nitrofurantoin (MACROBID) 100 MG Cap 2/18/2024 at am Patient No Yes   Sig: Take 1 Capsule by mouth 2 times a day for 5 days.   omeprazole (PRILOSEC) 20 MG delayed-release capsule 2/16/2024 at [facility] Patient Yes No   Sig: Take 20 mg by mouth every day.   ondansetron (ZOFRAN ODT) 4 MG TABLET DISPERSIBLE 2/18/2024 at 1450 Patient Yes Yes   Sig:  Take 4 mg by mouth every 8 hours as needed for Nausea/Vomiting.   sodium chloride (SALT) 1 GM Tab 2/18/2024 at am Patient No Yes   Sig: TAKE 1 TABLET BY MOUTH THREE TIMES A DAY WITH MEALS ( NOT COVERED/INS )   Patient taking differently: Take 1 g by mouth 3 times a day.   tizanidine (ZANAFLEX) 4 MG Tab 2/18/2024 at am Patient Yes Yes   Sig: Take 4 mg by mouth 2 times a day.   topiramate (TOPAMAX) 50 MG tablet 2/18/2024 at am Patient Yes Yes   Sig: Take 50 mg by mouth 2 times a day.   ziprasidone (GEODON) 40 MG Cap 2/17/2024 at hs Patient No Yes   Sig: Take 1 capsule by mouth at bedtime.      Facility-Administered Medications: None       Physical Exam  Temp:  [37.8 °C (100 °F)-38.3 °C (101 °F)] 37.8 °C (100 °F)  Pulse:  [] 89  Resp:  [16-25] 17  BP: (138-190)/(74-98) 138/74  SpO2:  [95 %-97 %] 95 %  Blood Pressure: 138/74   Temperature: 37.8 °C (100 °F)   Pulse: 89   Respiration: 17   Pulse Oximetry: 95 %       Physical Exam  Constitutional:       Appearance: Normal appearance. She is obese.   HENT:      Head: Normocephalic.      Nose: Nose normal.      Mouth/Throat:      Mouth: Mucous membranes are moist.   Eyes:      Pupils: Pupils are equal, round, and reactive to light.   Cardiovascular:      Rate and Rhythm: Normal rate and regular rhythm.      Pulses: Normal pulses.   Pulmonary:      Effort: Pulmonary effort is normal.      Breath sounds: Normal breath sounds.   Abdominal:      General: Abdomen is flat. Bowel sounds are normal.      Palpations: Abdomen is soft.      Tenderness: There is right CVA tenderness. There is no left CVA tenderness.   Musculoskeletal:      Cervical back: Neck supple.   Skin:     General: Skin is warm.   Neurological:      General: No focal deficit present.      Mental Status: She is alert and oriented to person, place, and time. Mental status is at baseline.   Psychiatric:         Mood and Affect: Mood normal.         Behavior: Behavior normal.         Thought Content: Thought  "content normal.         Judgment: Judgment normal.         Laboratory:  Recent Labs     02/16/24  1758 02/18/24  1630   WBC 7.3 7.3   RBC 4.72 4.00*   HEMOGLOBIN 15.1 12.9   HEMATOCRIT 43.1 36.3*   MCV 91.3 90.8   MCH 32.0 32.3   MCHC 35.0 35.5   RDW 42.5 41.9   PLATELETCT 136* 119*   MPV 12.1 11.7     Recent Labs     02/16/24  1758 02/18/24  1630   SODIUM 140 137   POTASSIUM 3.9 3.4*   CHLORIDE 109 107   CO2 17* 16*   GLUCOSE 111* 127*   BUN 10 10   CREATININE 0.75 0.90   CALCIUM 9.7 8.8     Recent Labs     02/16/24  1758 02/18/24  1630   ALTSGPT 34 25   ASTSGOT 16 12   ALKPHOSPHAT 79 70   TBILIRUBIN 0.8 0.9   LIPASE 24  --    GLUCOSE 111* 127*         No results for input(s): \"NTPROBNP\" in the last 72 hours.      No results for input(s): \"TROPONINT\" in the last 72 hours.    Imaging:  CT-RENAL COLIC EVALUATION(A/P W/O)   Final Result         1.  Stable right ureteral stent. No hydronephrosis. Tiny nonobstructing right renal stones.   2.  No significant interval change. No new abnormality.               DX-CHEST-PORTABLE (1 VIEW)   Final Result      No acute cardiopulmonary abnormality.          no X-Ray or EKG requiring interpretation    Assessment/Plan:  Justification for Admission Status  I anticipate this patient will require at least two midnights for appropriate medical management, necessitating inpatient admission because patient has pyelonephritis    Patient will need a Med/Surg bed on MEDICAL service .  The need is secondary to pyelonephritis.    * Pyelonephritis- (present on admission)  Assessment & Plan  Patient with recent ureteral stent placement on February 12  Found to have UTI once again.  CT renal showing intact right ureteral stent  Spoke with pharmacy, due to concerns of Pseudomonas, can try cefepime now  Fluids  Ativan for nausea vomiting (prolonged QT)  Tylenol    Hypokalemia- (present on admission)  Assessment & Plan  Replace    Schizophrenia (HCC)- (present on admission)  Assessment & " Plan  Continue home meds    Severe obesity (BMI >= 40) ([facility])- (present on admission)  Assessment & Plan  BMI 44    Borderline personality disorder ([facility])  Assessment & Plan  Continue home medications        VTE prophylaxis: enoxaparin ppx

## 2024-02-19 NOTE — ED TRIAGE NOTES
Chief Complaint   Patient presents with    Fever    Nausea    Weakness    Dizziness     PT is having an increase in her starting this morning which she's states was 104.7. Pt is also having progressive weakness, nausea, and dizziness which she says started on Friday.    Pt also had a lithotripsy on 2/12 and a stint placement, since then she has had pain on urination and blood in urine.

## 2024-02-19 NOTE — DIETARY
NUTRITION SERVICES: BMI - Pt with BMI >40 (=Body mass index is 45.03 kg/m².), Class III obesity. Weight loss counseling not appropriate in acute care setting. RECOMMEND - If appropriate at DC please refer to outpatient nutrition services for weight management.     RD available PRN

## 2024-02-19 NOTE — ASSESSMENT & PLAN NOTE
Patient with recent ureteral stent placement on February 12  -- She noted to be febrile, tachycardic, noted to have dysuria, increased frequency   -- UA consistent with UTI, blood cultures x 2, urine culture ordered, pending, follow   -- CT renal showing intact right ureteral stent  Will continue cefepime.    Later in the day, it is noted that patient CNA accidentally pulled out stent, reach out to Dr. Holm; who reca pulling out since white ring was noted in meatus           Pain improved.   Iv abx for today and transition to po in am

## 2024-02-19 NOTE — PROGRESS NOTES
4 Eyes Skin Assessment Completed by SONYA Echols and SONYA Zepeda.    Head WDL  Ears WDL  Nose WDL  Mouth WDL  Neck WDL  Breast/Chest WDL  Shoulder Blades WDL  Spine WDL  (R) Arm/Elbow/Hand Bruising  (L) Arm/Elbow/Hand WDL  Abdomen WDL  Groin WDL  Scrotum/Coccyx/Buttocks WDL  (R) Leg WDL  (L) Leg WDL  (R) Heel/Foot/Toe Bruising  (L) Heel/Foot/Toe WDL          Devices In Places Blood Pressure Cuff and Pulse Ox      Interventions In Place Pillows, Dri-James Pads, Heels Loaded W/Pillows, and Pressure Redistribution Mattress    Possible Skin Injury No    Pictures Uploaded Into Epic N/A  Wound Consult Placed N/A  RN Wound Prevention Protocol Ordered No

## 2024-02-19 NOTE — ED NOTES
Patient report to Felix HASSAN, patient transferred to the floor accompanied by transport. Patient AAO X4, respirations even and unlabored on room air, patient in possession of personal belongings.

## 2024-02-19 NOTE — ED NOTES
Medication history reviewed with patient at bedside.   Med rec is complete  Allergies reviewed.   Anticoagulants : No  Patient was prescribed a 7 day course of cefdinir 300mg on 1/27/24, this was finished per patient.  Patient was also prescribed a 5 day course of macrobid 100mg bid on 2/16/24, her last dose was 2/18/24 am.  Berlin Harrington PhT

## 2024-02-20 ENCOUNTER — APPOINTMENT (OUTPATIENT)
Dept: RADIOLOGY | Facility: MEDICAL CENTER | Age: 34
DRG: 690 | End: 2024-02-20
Attending: HOSPITALIST
Payer: MEDICARE

## 2024-02-20 LAB
ALBUMIN SERPL BCP-MCNC: 3.6 G/DL (ref 3.2–4.9)
ALBUMIN/GLOB SERPL: 1.6 G/DL
ALP SERPL-CCNC: 58 U/L (ref 30–99)
ALT SERPL-CCNC: 26 U/L (ref 2–50)
ANION GAP SERPL CALC-SCNC: 12 MMOL/L (ref 7–16)
AST SERPL-CCNC: 21 U/L (ref 12–45)
BACTERIA UR CULT: NORMAL
BILIRUB SERPL-MCNC: 0.5 MG/DL (ref 0.1–1.5)
BUN SERPL-MCNC: 9 MG/DL (ref 8–22)
CALCIUM ALBUM COR SERPL-MCNC: 9 MG/DL (ref 8.5–10.5)
CALCIUM SERPL-MCNC: 8.7 MG/DL (ref 8.5–10.5)
CHLORIDE SERPL-SCNC: 109 MMOL/L (ref 96–112)
CO2 SERPL-SCNC: 15 MMOL/L (ref 20–33)
CREAT SERPL-MCNC: 0.61 MG/DL (ref 0.5–1.4)
EKG IMPRESSION: NORMAL
ERYTHROCYTE [DISTWIDTH] IN BLOOD BY AUTOMATED COUNT: 40.1 FL (ref 35.9–50)
GFR SERPLBLD CREATININE-BSD FMLA CKD-EPI: 120 ML/MIN/1.73 M 2
GLOBULIN SER CALC-MCNC: 2.3 G/DL (ref 1.9–3.5)
GLUCOSE SERPL-MCNC: 113 MG/DL (ref 65–99)
HCT VFR BLD AUTO: 31.7 % (ref 37–47)
HGB BLD-MCNC: 11.2 G/DL (ref 12–16)
MAGNESIUM SERPL-MCNC: 2.1 MG/DL (ref 1.5–2.5)
MCH RBC QN AUTO: 31.5 PG (ref 27–33)
MCHC RBC AUTO-ENTMCNC: 35.3 G/DL (ref 32.2–35.5)
MCV RBC AUTO: 89 FL (ref 81.4–97.8)
PLATELET # BLD AUTO: 129 K/UL (ref 164–446)
PMV BLD AUTO: 11.5 FL (ref 9–12.9)
POTASSIUM SERPL-SCNC: 3.5 MMOL/L (ref 3.6–5.5)
PROT SERPL-MCNC: 5.9 G/DL (ref 6–8.2)
RBC # BLD AUTO: 3.56 M/UL (ref 4.2–5.4)
SIGNIFICANT IND 70042: NORMAL
SITE SITE: NORMAL
SODIUM SERPL-SCNC: 136 MMOL/L (ref 135–145)
SOURCE SOURCE: NORMAL
WBC # BLD AUTO: 4 K/UL (ref 4.8–10.8)

## 2024-02-20 PROCEDURE — 85027 COMPLETE CBC AUTOMATED: CPT

## 2024-02-20 PROCEDURE — A9270 NON-COVERED ITEM OR SERVICE: HCPCS

## 2024-02-20 PROCEDURE — 700102 HCHG RX REV CODE 250 W/ 637 OVERRIDE(OP): Performed by: HOSPITALIST

## 2024-02-20 PROCEDURE — 770006 HCHG ROOM/CARE - MED/SURG/GYN SEMI*

## 2024-02-20 PROCEDURE — 700111 HCHG RX REV CODE 636 W/ 250 OVERRIDE (IP): Performed by: STUDENT IN AN ORGANIZED HEALTH CARE EDUCATION/TRAINING PROGRAM

## 2024-02-20 PROCEDURE — 99233 SBSQ HOSP IP/OBS HIGH 50: CPT | Performed by: HOSPITALIST

## 2024-02-20 PROCEDURE — 700102 HCHG RX REV CODE 250 W/ 637 OVERRIDE(OP)

## 2024-02-20 PROCEDURE — A9270 NON-COVERED ITEM OR SERVICE: HCPCS | Performed by: HOSPITALIST

## 2024-02-20 PROCEDURE — 700105 HCHG RX REV CODE 258: Performed by: STUDENT IN AN ORGANIZED HEALTH CARE EDUCATION/TRAINING PROGRAM

## 2024-02-20 PROCEDURE — 80053 COMPREHEN METABOLIC PANEL: CPT

## 2024-02-20 PROCEDURE — 93010 ELECTROCARDIOGRAM REPORT: CPT | Performed by: INTERNAL MEDICINE

## 2024-02-20 PROCEDURE — 700102 HCHG RX REV CODE 250 W/ 637 OVERRIDE(OP): Performed by: STUDENT IN AN ORGANIZED HEALTH CARE EDUCATION/TRAINING PROGRAM

## 2024-02-20 PROCEDURE — A9270 NON-COVERED ITEM OR SERVICE: HCPCS | Performed by: STUDENT IN AN ORGANIZED HEALTH CARE EDUCATION/TRAINING PROGRAM

## 2024-02-20 PROCEDURE — 83735 ASSAY OF MAGNESIUM: CPT

## 2024-02-20 PROCEDURE — 700105 HCHG RX REV CODE 258: Performed by: HOSPITALIST

## 2024-02-20 PROCEDURE — 700111 HCHG RX REV CODE 636 W/ 250 OVERRIDE (IP): Performed by: HOSPITALIST

## 2024-02-20 PROCEDURE — 93005 ELECTROCARDIOGRAM TRACING: CPT | Performed by: HOSPITALIST

## 2024-02-20 PROCEDURE — 36415 COLL VENOUS BLD VENIPUNCTURE: CPT

## 2024-02-20 RX ORDER — SCOLOPAMINE TRANSDERMAL SYSTEM 1 MG/1
1 PATCH, EXTENDED RELEASE TRANSDERMAL
Status: DISCONTINUED | OUTPATIENT
Start: 2024-02-20 | End: 2024-02-22 | Stop reason: HOSPADM

## 2024-02-20 RX ORDER — CALCIUM CARBONATE 500 MG/1
500 TABLET, CHEWABLE ORAL 3 TIMES DAILY PRN
Status: DISCONTINUED | OUTPATIENT
Start: 2024-02-20 | End: 2024-02-22 | Stop reason: HOSPADM

## 2024-02-20 RX ORDER — POTASSIUM CHLORIDE 20 MEQ/1
20 TABLET, EXTENDED RELEASE ORAL ONCE
Status: COMPLETED | OUTPATIENT
Start: 2024-02-20 | End: 2024-02-20

## 2024-02-20 RX ORDER — PROMETHAZINE HYDROCHLORIDE 25 MG/1
25 TABLET ORAL EVERY 6 HOURS PRN
Status: DISCONTINUED | OUTPATIENT
Start: 2024-02-20 | End: 2024-02-22 | Stop reason: HOSPADM

## 2024-02-20 RX ORDER — PROCHLORPERAZINE EDISYLATE 5 MG/ML
10 INJECTION INTRAMUSCULAR; INTRAVENOUS EVERY 6 HOURS PRN
Status: DISCONTINUED | OUTPATIENT
Start: 2024-02-20 | End: 2024-02-22 | Stop reason: HOSPADM

## 2024-02-20 RX ADMIN — SODIUM BICARBONATE 1300 MG: 650 TABLET ORAL at 22:29

## 2024-02-20 RX ADMIN — POTASSIUM CHLORIDE 20 MEQ: 20 TABLET, EXTENDED RELEASE ORAL at 12:31

## 2024-02-20 RX ADMIN — LAMOTRIGINE 50 MG: 100 TABLET ORAL at 22:29

## 2024-02-20 RX ADMIN — METOPROLOL SUCCINATE 25 MG: 25 TABLET, EXTENDED RELEASE ORAL at 05:57

## 2024-02-20 RX ADMIN — SODIUM CHLORIDE 1 G: 1 TABLET ORAL at 22:29

## 2024-02-20 RX ADMIN — SODIUM BICARBONATE 1300 MG: 650 TABLET ORAL at 16:26

## 2024-02-20 RX ADMIN — TIZANIDINE 4 MG: 4 TABLET ORAL at 12:32

## 2024-02-20 RX ADMIN — PROCHLORPERAZINE EDISYLATE 10 MG: 5 INJECTION INTRAMUSCULAR; INTRAVENOUS at 17:41

## 2024-02-20 RX ADMIN — LAMOTRIGINE 50 MG: 100 TABLET ORAL at 12:32

## 2024-02-20 RX ADMIN — SODIUM BICARBONATE 1300 MG: 650 TABLET ORAL at 12:31

## 2024-02-20 RX ADMIN — TIZANIDINE 4 MG: 4 TABLET ORAL at 22:29

## 2024-02-20 RX ADMIN — SODIUM CHLORIDE 1 G: 1 TABLET ORAL at 05:57

## 2024-02-20 RX ADMIN — LORAZEPAM 2 MG: 2 INJECTION INTRAMUSCULAR; INTRAVENOUS at 08:37

## 2024-02-20 RX ADMIN — SODIUM CHLORIDE, POTASSIUM CHLORIDE, SODIUM LACTATE AND CALCIUM CHLORIDE: 600; 310; 30; 20 INJECTION, SOLUTION INTRAVENOUS at 02:59

## 2024-02-20 RX ADMIN — SODIUM CHLORIDE 1 G: 1 TABLET ORAL at 12:31

## 2024-02-20 RX ADMIN — PROMETHAZINE HYDROCHLORIDE 25 MG: 25 TABLET ORAL at 20:35

## 2024-02-20 RX ADMIN — PROCHLORPERAZINE EDISYLATE 10 MG: 5 INJECTION INTRAMUSCULAR; INTRAVENOUS at 10:32

## 2024-02-20 RX ADMIN — IVABRADINE 7.5 MG: 7.5 TABLET, FILM COATED ORAL at 22:28

## 2024-02-20 RX ADMIN — CEFEPIME 2 G: 2 INJECTION, POWDER, FOR SOLUTION INTRAVENOUS at 22:29

## 2024-02-20 RX ADMIN — CEFEPIME 2 G: 2 INJECTION, POWDER, FOR SOLUTION INTRAVENOUS at 09:57

## 2024-02-20 RX ADMIN — ZIPRASIDONE HYDROCHLORIDE 40 MG: 40 CAPSULE ORAL at 22:29

## 2024-02-20 RX ADMIN — OXYCODONE 5 MG: 5 TABLET ORAL at 16:26

## 2024-02-20 RX ADMIN — ANTACID TABLETS 500 MG: 500 TABLET, CHEWABLE ORAL at 22:28

## 2024-02-20 RX ADMIN — SCOPOLAMINE 1 PATCH: 1.5 PATCH, EXTENDED RELEASE TRANSDERMAL at 21:36

## 2024-02-20 RX ADMIN — ENOXAPARIN SODIUM 40 MG: 100 INJECTION SUBCUTANEOUS at 16:27

## 2024-02-20 ASSESSMENT — ENCOUNTER SYMPTOMS
NAUSEA: 1
COUGH: 0
PALPITATIONS: 0
SPUTUM PRODUCTION: 0
HEADACHES: 0
VOMITING: 1
BLURRED VISION: 0
FLANK PAIN: 1
HEMOPTYSIS: 0
NERVOUS/ANXIOUS: 0
DOUBLE VISION: 0
HEARTBURN: 0

## 2024-02-20 ASSESSMENT — PAIN DESCRIPTION - PAIN TYPE
TYPE: ACUTE PAIN

## 2024-02-20 NOTE — CARE PLAN
The patient is Stable - Low risk of patient condition declining or worsening    Shift Goals  Clinical Goals: nausea control, pain control  Patient Goals: nausea control, pain control  Family Goals: not present    Progress made toward(s) clinical / shift goals: Pain controlled, nausea improving able to get morning medication down after noon.  Slow to mobilize however does report home WC and walker usage.     Patient is not progressing towards the following goals:

## 2024-02-20 NOTE — RESPIRATORY CARE
COPD EDUCATION by COPD CLINICAL EDUCATOR  2/20/2024 at 6:09 AM by Linsey Judge, APARNA     Patient reviewed by COPD education team. Patient does not have a history or diagnosis of COPD and is a non-smoker.  Therefore, patient does not qualify for the COPD program.

## 2024-02-20 NOTE — HOSPITAL COURSE
This 33-year-old female with a past medical history significant for nephrolithiasis, patient had a laser lithotripsy and ureteral stent replacement on 2/12 presented to the ER on 2/18/2024 with fever, nausea, dizziness.    Patient stated that she has generalized weakness, fever, chills, vomiting, nausea, right-sided flank pain thus had to come to ER.  She checked the temperature prior to arrival and found to be 104.      Open presents in ER, she is noted to be febrile with a temperature of 101, tachycardic with heart rate of 100, tachypneic with respirate of 24 WBC 7.3, platelet 109, potassium 3.4; UA consistent with UTI.  Blood cultures x 2 ordered, UA and urine culture ordered.  Patient was started on Cefepime . CT  Renal showing Stable right ureteral stent. No hydronephrosis. Tiny nonobstructing right renal stones.     Interval events:  --- No acute events overnight, overnight, patient was febrile with temperature as high as 101  -Blood cultures ordered, pending, urine culture ordered, pending, patient was started on broad-spectrum antibiotics including cefepime.  --It is noted that patient has accidentally pulled out her stent,  and she was noted to be in intractable amount of pain  --Will try to pull out the stent since the white ring is noted at the meatus.  -- Urology Dr Holm contacted, his PAC will be in tomorrow to evaluate the patient.

## 2024-02-20 NOTE — PROGRESS NOTES
Second episode of vomiting, a very small amount of breakfast.  Defer oral medication until vomiting subsides.

## 2024-02-20 NOTE — CONSULTS
UROLOGY Consult Note:    Emmy Gross P.A.-C.  Date & Time note created:    2/20/2024   8:33 AM     Referring MD:  Dr. Dimas     Patient ID:   Name:             Kristin Balderrama   YOB: 1990  Age:                 33 y.o.  female   MRN:               [id number]                                                             Reason for Consult:      Fever after recent CULTS, ureteral stent on string protrusion.     History of Present Illness:    Kristin Balderrama is a 33 y.o. female multiple comorbidity, known to our office for recent R CULTS 2/12, presented to the emergency department for fevers, chills, nausea. After CULTS, stent was left on string. Pt was given bactrim postoperatively and, followed by urologist Dr. Roberson. CT 2/19 with stable right ureteral stent. No hydronephrosis. Tiny nonobstructing right renal stones. Dr. Holm was consulted on the pt last night and given protrusion of the stent, the nurses were instructed to remove the stent. Stent was planned to removed in office today. Today pt feels improved after stent was removed. Denies pain. She does report gross hematuria for the past week.     Review of Systems:      Constitutional: Denies fevers   Eyes: Denies changes in vision   Ears/Nose/Throat/Mouth: Denies nasal congestion   Cardiovascular: no chest pain   Respiratory: no shortness of breath   Gastrointestinal/Hepatic: Denies abdominal pain   Genitourinary: Denies hematuria, dysuria or frequency  Musculoskeletal/Rheum: Denies joint pain   Skin: Denies rash  Neurological: Denies headache   Psychiatric: denies mood disorder   Endocrine: Bria thyroid problems  Heme/Oncology/Lymph Nodes: Denies enlarged lymph nodes   All other systems were reviewed and are negative (AMA/CMS criteria)                Past Medical History:   Past Medical History:   Diagnosis Date    Abdominal pain     Anginal syndrome     random chest pain especially with tachycardia    Apnea, sleep  "    Arrhythmia     \"sinus tachycardia\", cariologist, Dr. Kumar; ablation 2/2016    Arthritis     osteo    ASTHMA     when around smoke    Back pain     Borderline personality disorder (HCC)     Breath shortness     with tachycardia    Bronchitis 02/08/2022    Last time was 12/21    Cardiac arrhythmia     Chickenpox     Chronic UTI 09/18/2010    Cough     Daytime sleepiness     Dental disorder 03/08/2021    infected tooth    Depression     Diabetes (HCC)     Diarrhea     incontinece    Disorder of thyroid     Hashimoto's    Fall     Fatigue     Frequent headaches     Gasping for breath     Gynecological disorder     PCOS    Headache(784.0)     Heart burn     Heart murmur     History of falling     Indigestion     Migraine     Mitochondrial disease (HCC)     Multiple personality disorder (HCC)     Nausea     Obesity     Other fatigue 06/29/2020    Pain 08/15/2012    back, 7/10    Painful joint     PCOS (polycystic ovarian syndrome)     Pneumonia 2012 12/2020    POTS (postural orthostatic tachycardia syndrome)     Psychosis ([facility])     Ringing in ears     Scoliosis     Shortness of breath     O2 as needed    Sinus tachycardia 10/31/2013    Sleep apnea     CPAP \"pulmonary doctor took me off mid year 2016\"    Snoring     Supraventricular tachycardia 4/10/2019    Tonsillitis     Transverse myelitis ([facility])     2/8/22: Per pt: not anymore    Tuberculosis     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    Urinary bladder disorder     Suprapubic cath. 2/8/22: Not anymore.     Urinary incontinence     Weakness     Wears glasses      Active Hospital Problems    Diagnosis     Pyelonephritis [N12]     Thrombocytopenia ([facility]) [D69.6]     Hypokalemia [E87.6]     Schizophrenia ([facility]) [F20.9]     Severe obesity (BMI >= 40) ([facility]) [E66.01]     Borderline personality disorder ([facility]) [F60.3]        Past Surgical History:  Past Surgical History:   Procedure Laterality Date    VT CYSTOSCOPY,INSERT URETERAL STENT Right 2/12/2024    Procedure: " CYSTOSCOPY, WITH RIGHT URETEROSCOPY, WITH LITHOTRIPSY, WITH INSERTION OF RIGHT URETERAL STENT;  Surgeon: Josafat Roberson M.D.;  [place];  Service: Urology    MO CYSTO/URETERO/PYELOSCOPY, DX Right 2/12/2024    Procedure: URETEROSCOPY;  Surgeon: Josafat Roberson M.D.;  [place];  Service: Urology    LASERTRIPSY Right 2/12/2024    Procedure: LITHOTRIPSY, USING LASER;  Surgeon: Josafat Roberson M.D.;  [place];  Service: Urology    INGUINAL HERNIA LAPAROSCOPIC Right 07/21/2023    Procedure: LAPAROSCOPIC RIGHT INGUINAL HERNIA REPAIR WITH MESH;  Surgeon: Joe Noyola M.D.;  [place];  Service: General    HERNIA REPAIR Right 07/21/2023    PB PERCUT FIX PBOX/NECK FEMUR FX Left 01/28/2023    Procedure: FIXATION, HIP, USING CANNULATED SCREW;  Surgeon: Noman Abdul M.D.;  [place];  Service: Orthopedics    MO LAP,DIAGNOSTIC ABDOMEN  02/14/2022    Procedure: LAPAROSCOPY;  Surgeon: Seamus Pisano M.D.;  Location: SURGERY SAME DAY [facility];  Service: Gynecology    OVARIAN CYSTECTOMY Right 02/14/2022    Procedure: EXCISION, CYST, OVARY;  Surgeon: Seamus Pisano M.D.;  Location: SURGERY SAME DAY [facility];  Service: Gynecology    BOWEL STIMULATOR INSERTION  03/10/2021    Procedure: INSERTION, ELECTRODE LEADS AND PULSE GENERATOR, NEUROSTIMULATOR, SACRAL - REMOVAL OF INTERSTIM WITH REPLACEMENT OF SACRAL NEUROMODULATION DEVICE;  Surgeon: Joe Noyola M.D.;  [place];  Service: General    MUSCLE BIOPSY Right 01/26/2017    Procedure: MUSCLE BIOPSY - THIGH;  Surgeon: Isidro Vigil M.D.;  Location: [facility];  Service:     GASTROSCOPY WITH BALLOON DILATATION N/A 01/04/2017    Procedure: GASTROSCOPY WITH DILATATION;  Surgeon: Torres Vargas M.D.;  Location: [facility];  Service:     BOWEL STIMULATOR INSERTION  07/13/2016    Procedure: BOWEL STIMULATOR INSERTION FOR PERMANENT  INTERSTIM SACRAL IMPLANT;  Surgeon: Joe Noyola M.D.;  Location: SURGERY [facility];  Service:     RECOVERY  01/27/2016    Procedure: CATH LAB EP STUDY WITH SINUS NODE MODIFICATION ABHINAV;  Surgeon: Lina Surgery;  Location: SURGERY PRE-POST PROC UNIT [place];  Service:     OTHER CARDIAC SURGERY  01/2016    cardiac ablation    NEURO DEST FACET L/S W/IG SNGL  04/21/2015    Performed by Reza Tabor at SUR[facility]ERY [facility]    LUMBAR FUSION ANTERIOR  08/21/2012    Performed by SUSIE SAWANT at SURGERY [facility]    ALYSSA BY LAPAROSCOPY  08/29/2010    Performed by SHAYY JOHNS at SURGERY [facility]    LAMINOTOMY      OTHER ABDOMINAL SURGERY      TONSILLECTOMY      tonsillectomy       Hospital Medications:    Current Facility-Administered Medications:     potassium chloride SA (Kdur) tablet 20 mEq, 20 mEq, Oral, Once, Chacorta Juan M.D.    traMADol (Ultram) 50 MG tablet 50 mg, 50 mg, Oral, Q6HRS PRN, Ilana Calabrese M.D.    oxyCODONE immediate-release (Roxicodone) tablet 5 mg, 5 mg, Oral, Q4HRS PRN, Ilana Calabrese M.D., 5 mg at 02/19/24 1816    lactated ringers infusion, , Intravenous, Continuous, Chacorta Juan M.D., Last Rate: 100 mL/hr at 02/20/24 0259, New Bag at 02/20/24 0259    sodium bicarbonate tablet 1,300 mg, 1,300 mg, Oral, TID, Ilana Calabrese M.D., 1,300 mg at 02/19/24 1816    enoxaparin (Lovenox) inj 40 mg, 40 mg, Subcutaneous, DAILY AT 1800, Abelardo Baxter M.D., 40 mg at 02/19/24 1627    acetaminophen (Tylenol) tablet 650 mg, 650 mg, Oral, Q6HRS PRN, Abelardo Baxter M.D., 650 mg at 02/19/24 1627    LORazepam (Ativan) injection 2 mg, 2 mg, Intravenous, Q4HRS PRN, Abelardo Baxter M.D., 2 mg at 02/19/24 2004    metoprolol SR (Toprol XL) tablet 25 mg, 25 mg, Oral, DAILY, Abelardo Baxter M.D., 25 mg at 02/20/24 0557    lamoTRIgine (LaMICtal) tablet 50 mg, 50 mg, Oral, BID, Abelardo Baxter M.D., 50 mg at 02/19/24 2055    sodium chloride (Salt) tablet 1  g, 1 g, Oral, TID, Abelardo Baxter M.D., 1 g at 02/20/24 0557    tizanidine (Zanaflex) tablet 4 mg, 4 mg, Oral, BID, Abelardo Baxter M.D., 4 mg at 02/19/24 2055    ziprasidone (Geodon) capsule 40 mg, 40 mg, Oral, QHS, Abelardo Baxter M.D., 40 mg at 02/19/24 2055    cefepime (Maxipime) in D5W IV syringe 2 g, 2 g, Intravenous, Q12HRS, Abelardo Baxter M.D., 2 g at 02/19/24 2055    Current Outpatient Medications:  Medications Prior to Admission   Medication Sig Dispense Refill Last Dose    nitrofurantoin (MACROBID) 100 MG Cap Take 1 Capsule by mouth 2 times a day for 5 days. 10 Capsule 0 2/18/2024 at am    docusate sodium 250 MG Cap Take 250 mg by mouth 2 times a day.   2/18/2024 at am    ondansetron (ZOFRAN ODT) 4 MG TABLET DISPERSIBLE Take 4 mg by mouth every 8 hours as needed for Nausea/Vomiting.   2/18/2024 at 1450    sodium chloride (SALT) 1 GM Tab TAKE 1 TABLET BY MOUTH THREE TIMES A DAY WITH MEALS ( NOT COVERED/INS ) (Patient taking differently: Take 1 g by mouth 3 times a day.) 90 Tablet 2 2/18/2024 at am    lamoTRIgine (LAMICTAL) 25 MG Tab Take 50 mg by mouth 2 times a day. 50 mg = 2 tablets   2/18/2024 at am    ivabradine (CORLANOR) 7.5 MG Tab tablet Take 1 Tablet by mouth 2 times a day with meals. 60 Tablet 2 2/18/2024 at am    topiramate (TOPAMAX) 50 MG tablet Take 50 mg by mouth 2 times a day.   2/18/2024 at am    metoprolol SR (TOPROL XL) 25 MG TABLET SR 24 HR Take 1 Tablet by mouth every day. 90 Tablet 3 2/18/2024 at am    tizanidine (ZANAFLEX) 4 MG Tab Take 4 mg by mouth 2 times a day.   2/18/2024 at am    ziprasidone (GEODON) 40 MG Cap Take 1 capsule by mouth at bedtime. 30 Capsule 2 2/17/2024 at hs    diphenhydrAMINE (BENADRYL) 25 MG Tab Take 25-50 mg by mouth 2 times a day. 25 mg in the morning, 50 mg in the evening   2/18/2024 at am    Melatonin 10 MG Tab Take 10 mg by mouth at bedtime.   2/17/2024 at hs    omeprazole (PRILOSEC) 20 MG delayed-release capsule Take 20 mg by mouth every day.  "  2/16/2024 at [facility]    Galcanezumab-gnlm (EMGALITY) 120 MG/ML Solution Auto-injector Inject 120 mg under the skin every 30 (thirty) days.   1/27/2024 at [facility]    fluconazole (DIFLUCAN) 150 MG tablet Take 150 mg by mouth every Wednesday.   2/14/2024 at [facility]    Adalimumab 80 MG/0.8ML Pen-injector Kit Inject 80 mg under the skin every 14 days.   4 weeks ago at [facility]    etonogestrel (NEXPLANON) 68 MG Implant implant Inject 1 Each under the skin see administration instructions. Pt reports she is not sure when she had this medications injected last, pt reports she still has it in her arm  Every 3 years to change   continuous at continuous       Medication Allergy:  Allergies   Allergen Reactions    Cefdinir Shortness of Breath and Itching     Tolerated 1/18/17  Tolerates ceftriaxone  Tolerated augmentin 8/2019     Depakote [Divalproex Sodium] Unspecified     Muscle spasms/muscle pain and weakness      Doxycycline Anaphylaxis and Vomiting     Other reaction(s): pustules/blisters  Other reaction(s): stomach pain    Montelukast [Singulair] Unspecified     Cardiac effusion    Vancomycin Itching     Pt becomes flushed in face and chest.   RXN=7/10/16    Wound Dressing Adhesive Rash     By pt report-\"removes skin totally off\"    Amitriptyline Unspecified     Headaches      Amoxicillin Rash          Aripiprazole Unspecified    Aripiprazole [Abilify] Unspecified     Headaches/muscle twitching      Clindamycin Nausea         Other reaction(s): nausea stomach pain    Depakote [Divalproex Sodium]     Erythromycin Rash     .  Other reaction(s): nausea stomach pain    Flomax [Tamsulosin Hydrochloride] Swelling    Hydromorphone      Other reaction(s): vomiting    Levaquin Unspecified     Severe muscle cramps in legs  RXN=unknown  Other reaction(s): leg muscle cramps    Metformin Unspecified     Increased lactic acid     Other reaction(s): itching and rash/nausea vomiting    Tamsulosin Swelling     Swelling of legs    Tape Rash     Tears skin " "off  coban with Tegaderm tape ok intermittently  RXN=ongoing    Ampicillin Rash     Pt reports that she received a rash     Ciprofloxacin Rash          Keflex Rash     Pt states she gets a rash with this medication  Tolerates ceftriaxone  Can take with Benadryl    Levofloxacin Unspecified     Leg muscle cramps    Metronidazole Rash     \"Vision problems\"  Other reaction(s): vision problems    Montelukast     Penicillins Hives     Can take with Benadryl    Sulfa Drugs Itching, Myalgia and Hives     Muscle pain and weakness  Other reaction(s): unknown    Valproic Acid Rash     .       Family History:  Family History   Problem Relation Age of Onset    Hypertension Mother     Sleep Apnea Mother     Heart Disease Mother     Other Mother         hypothryod    Hypertension Maternal Uncle     Heart Disease Maternal Grandmother     Hypertension Maternal Grandmother     No Known Problems Sister     Other Sister         Narcolepsy;fibromyalsia;bone;nerve    Genitourinary () Problems Sister         endometriosis       Social History:  Social History     Socioeconomic History    Marital status: Single     Spouse name: Not on file    Number of children: Not on file    Years of education: Not on file    Highest education level: Not on file   Occupational History    Not on file   Tobacco Use    Smoking status: Never    Smokeless tobacco: Never   Vaping Use    Vaping Use: Never used   Substance and Sexual Activity    Alcohol use: No     Alcohol/week: 0.0 oz    Drug use: Never     Frequency: 7.0 times per week    Sexual activity: Not Currently     Birth control/protection: Implant   Other Topics Concern    Not on file   Social History Narrative    ** Merged History Encounter **          Social Determinants of Health     Financial Resource Strain: Medium Risk (4/30/2021)    Overall Financial Resource Strain (CARDIA)     Difficulty of Paying Living Expenses: Somewhat hard   Food Insecurity: Food Insecurity Present (4/30/2021)    " "Hunger Vital Sign     Worried About Running Out of Food in the Last Year: Sometimes true     Ran Out of Food in the Last Year: Sometimes true   Transportation Needs: No Transportation Needs (2/13/2023)    PRAPARE - Transportation     Lack of Transportation (Medical): No     Lack of Transportation (Non-Medical): No   Physical Activity: Not on file   Stress: Not on file   Social Connections: Not on file   Intimate Partner Violence: Not on file   Housing Stability: Low Risk  (9/29/2020)    Housing Stability Vital Sign     Unable to Pay for Housing in the Last Year: No     Number of Places Lived in the Last Year: 1     Unstable Housing in the Last Year: No         Physical Exam:  Vitals/ General Appearance:   Weight/BMI: Body mass index is 45.03 kg/m².  BP (!) 158/93   Pulse 88   Temp 36 °C (96.8 °F) (Temporal)   Resp 18   Ht 1.626 m (5' 4\")   Wt 119 kg (262 lb 5.6 oz)   SpO2 97%   Vitals:    02/19/24 1816 02/19/24 2047 02/20/24 0558 02/20/24 0744   BP:  (!) 172/96 (!) 145/79 (!) 158/93   Pulse:  94 90 88   Resp: 18 20 18 18   Temp:  36.4 °C (97.5 °F) 36.5 °C (97.7 °F) 36 °C (96.8 °F)   TempSrc:  Temporal Temporal Temporal   SpO2:  98% 99% 97%   Weight:       Height:         Oxygen Therapy:  Pulse Oximetry: 97 %, O2 (LPM): 0, O2 Delivery Device: None - Room Air    Constitutional:   Well developed, Well nourished, No acute distress  HENMT:  Normocephalic, Atraumatic, Oropharynx moist mucous membranes, No oral exudates, Nose normal.  No thyromegaly.  Eyes:  EOMI, Conjunctiva normal, No discharge.  Neck:  Normal range of motion, No cervical tenderness.  Lungs:  normal resp effort   : -CVAT  Skin: Warm, Dry, No erythema, No rash, no induration.  Neurologic: Alert & oriented x 3, No focal deficits noted.  Psychiatric: Affect normal, Judgment normal, Mood normal.      MDM (Data Review):     Records reviewed and summarized in current documentation    Lab Data Review:  Recent Results (from the past 24 hour(s))   CBC " WITHOUT DIFFERENTIAL    Collection Time: 02/20/24  3:30 AM   Result Value Ref Range    WBC 4.0 (L) 4.8 - 10.8 K/uL    RBC 3.56 (L) 4.20 - 5.40 M/uL    Hemoglobin 11.2 (L) 12.0 - 16.0 g/dL    Hematocrit 31.7 (L) 37.0 - 47.0 %    MCV 89.0 81.4 - 97.8 fL    MCH 31.5 27.0 - 33.0 pg    MCHC 35.3 32.2 - 35.5 g/dL    RDW 40.1 35.9 - 50.0 fL    Platelet Count 129 (L) 164 - 446 K/uL    MPV 11.5 9.0 - 12.9 fL   Comp Metabolic Panel    Collection Time: 02/20/24  3:30 AM   Result Value Ref Range    Sodium 136 135 - 145 mmol/L    Potassium 3.5 (L) 3.6 - 5.5 mmol/L    Chloride 109 96 - 112 mmol/L    Co2 15 (L) 20 - 33 mmol/L    Anion Gap 12.0 7.0 - 16.0    Glucose 113 (H) 65 - 99 mg/dL    Bun 9 8 - 22 mg/dL    Creatinine 0.61 0.50 - 1.40 mg/dL    Calcium 8.7 8.5 - 10.5 mg/dL    Correct Calcium 9.0 8.5 - 10.5 mg/dL    AST(SGOT) 21 12 - 45 U/L    ALT(SGPT) 26 2 - 50 U/L    Alkaline Phosphatase 58 30 - 99 U/L    Total Bilirubin 0.5 0.1 - 1.5 mg/dL    Albumin 3.6 3.2 - 4.9 g/dL    Total Protein 5.9 (L) 6.0 - 8.2 g/dL    Globulin 2.3 1.9 - 3.5 g/dL    A-G Ratio 1.6 g/dL   ESTIMATED GFR    Collection Time: 02/20/24  3:30 AM   Result Value Ref Range    GFR (CKD-EPI) 120 >60 mL/min/1.73 m 2       Imaging/Procedures Review:    Reviewed    MDM (Assessment and Plan):       34 yo F admitted for fever/chills, n/v POD 8 from R CULTS 2/12. Pt was on post operative abx, bactrim. Hx of ESBL E.Coli and pt with multiple allergies. UA nitrate positive. Pt has stabilized and improved with IV abx maxipime. H/H 11.2/31.7(13.9/36) low platelets. Pts recent hx of gross hematuria could partially explain this drop.        Plan:  Continue abx per hospital team, appreciate their cares and inputs.   Follow up on Urine and blood cultures.   No surgical interventions planned at this time.   Urology to follow while in pt     Dr. Roberson is aware of this consult and has directed this plan of care      APARNA CaceresA.-VALENTINE.   [address] [place] [address]    [phone]

## 2024-02-20 NOTE — PROGRESS NOTES
Patient had one episode of vomiting, about 125ml of mostly clear, yellow green liquid consistent with apple juice she was attempting to drink. Reports residual nausea and was medicated with lorazepam for nausea.

## 2024-02-20 NOTE — PROGRESS NOTES
[facility] Medicine Daily Progress Note    Date of Service  2/19/2024    Chief Complaint  Kristin Balderrama is a 33 y.o. female admitted 2/18/2024 with   Chief Complaint   Patient presents with    Fever    Nausea    Weakness    Dizziness     PT is having an increase in her starting this morning which she's states was 104.7. Pt is also having progressive weakness, nausea, and dizziness which she says started on Friday.         Hospital Course  This 33-year-old female with a past medical history significant for nephrolithiasis, patient had a laser lithotripsy and ureteral stent replacement on 2/12 presented to the ER on 2/18/2024 with fever, nausea, dizziness.    Patient stated that she has generalized weakness, fever, chills, vomiting, nausea, right-sided flank pain thus had to come to ER.  She checked the temperature prior to arrival and found to be 104.      Open presents in ER, she is noted to be febrile with a temperature of 101, tachycardic with heart rate of 100, tachypneic with respirate of 24 WBC 7.3, platelet 109, potassium 3.4; UA consistent with UTI.  Blood cultures x 2 ordered, UA and urine culture ordered.  Patient was started on Cefepime . CT  Renal showing Stable right ureteral stent. No hydronephrosis. Tiny nonobstructing right renal stones.     Interval events:  --- No acute events overnight, overnight, patient was febrile with temperature as high as 101  -Blood cultures ordered, pending, urine culture ordered, pending, patient was started on broad-spectrum antibiotics including cefepime.  --It is noted that patient has accidentally pulled out her stent,  and she was noted to be in intractable amount of pain  --Will try to pull out the stent since the white ring is noted at the meatus.  -- Urology Dr Holm contacted, his PAC will be in tomorrow to evaluate the patient.    I have discussed this patient's plan of care and discharge plan at IDT rounds today with Case Management, Nursing, Nursing  leadership, and other members of the IDT team.    Consultants/Specialty  urology    Code Status  Full Code    Disposition  The patient is not medically cleared for discharge to home or a post-acute facility.  Anticipate discharge to: home with close outpatient follow-up    I have placed the appropriate orders for post-discharge needs.    Review of Systems  Review of Systems   Constitutional:  Negative for malaise/fatigue.   HENT:  Negative for congestion.    Eyes:  Negative for blurred vision and double vision.   Respiratory:  Negative for cough, hemoptysis and sputum production.    Cardiovascular:  Negative for chest pain and palpitations.   Gastrointestinal:  Negative for heartburn, nausea and vomiting.   Genitourinary:  Positive for dysuria, flank pain, frequency and urgency.   Musculoskeletal:  Positive for joint pain.   Neurological:  Negative for headaches.   Psychiatric/Behavioral:  The patient is not nervous/anxious.         Physical Exam  Temp:  [36.3 °C (97.4 °F)-38.2 °C (100.7 °F)] 36.4 °C (97.6 °F)  Pulse:  [76-99] 85  Resp:  [15-20] 18  BP: (124-161)/(65-89) 125/69  SpO2:  [95 %-97 %] 95 %    Physical Exam  Vitals and nursing note reviewed.   Constitutional:       Appearance: She is obese.   HENT:      Head: Normocephalic and atraumatic.   Eyes:      Extraocular Movements: Extraocular movements intact.      Pupils: Pupils are equal, round, and reactive to light.   Cardiovascular:      Rate and Rhythm: Normal rate.   Pulmonary:      Effort: No respiratory distress.      Breath sounds: Normal breath sounds.   Abdominal:      Tenderness: There is right CVA tenderness.   Musculoskeletal:      Cervical back: Neck supple.      Right lower leg: No edema.      Left lower leg: No edema.   Skin:     General: Skin is warm.   Neurological:      Mental Status: She is alert.      Cranial Nerves: No cranial nerve deficit.         Fluids    Intake/Output Summary (Last 24 hours) at 2/19/2024 1801  Last data filed at  2/19/2024 1615  Gross per 24 hour   Intake 1641 ml   Output 1400 ml   Net 241 ml       Laboratory  Recent Labs     02/18/24  1630 02/19/24  0429   WBC 7.3 5.2   RBC 4.00* 3.46*   HEMOGLOBIN 12.9 11.2*   HEMATOCRIT 36.3* 31.1*   MCV 90.8 89.9   MCH 32.3 32.4   MCHC 35.5 36.0*   RDW 41.9 41.0   PLATELETCT 119* 107*   MPV 11.7 11.6     Recent Labs     02/18/24  1630 02/19/24  0429   SODIUM 137 135   POTASSIUM 3.4* 4.1   CHLORIDE 107 111   CO2 16* 13*   GLUCOSE 127* 129*   BUN 10 10   CREATININE 0.90 0.55   CALCIUM 8.8 8.3*                   Imaging  CT-RENAL COLIC EVALUATION(A/P W/O)   Final Result         1.  Stable right ureteral stent. No hydronephrosis. Tiny nonobstructing right renal stones.   2.  No significant interval change. No new abnormality.               DX-CHEST-PORTABLE (1 VIEW)   Final Result      No acute cardiopulmonary abnormality.           Assessment/Plan  * Pyelonephritis- (present on admission)  Assessment & Plan  Patient with recent ureteral stent placement on February 12  -- She noted to be febrile, tachycardic, noted to have dysuria, increased frequency   -- UA consistent with UTI, blood cultures x 2, urine culture ordered, pending, follow   -- CT renal showing intact right ureteral stent  Will continue cefepime.    Later in the day, it is noted that patient CNA accidentally pulled out stent, reach out to Dr. Holm; who reca pulling out since white ring was noted in meatus       Thrombocytopenia (HCC)- (present on admission)  Assessment & Plan  At 107, monitor, not actively bleeding.    Hypokalemia- (present on admission)  Assessment & Plan  Replace    Schizophrenia (HCC)- (present on admission)  Assessment & Plan  Continue home meds    Severe obesity (BMI >= 40) ([facility])- (present on admission)  Assessment & Plan  BMI 44  Lifestyle recently diet exercise and weight loss    Borderline personality disorder ([facility])  Assessment & Plan  Continue home medications         VTE prophylaxis: lovenox

## 2024-02-20 NOTE — CARE PLAN
The patient is Stable - Low risk of patient condition declining or worsening    Shift Goals  Clinical Goals: Safety, Decrease nausea  Patient Goals: Comfort  Family Goals: Not present    Progress made toward(s) clinical / shift goals:    Problem: Knowledge Deficit - Standard  Goal: Patient and family/care givers will demonstrate understanding of plan of care, disease process/condition, diagnostic tests and medications  Outcome: Progressing  Note: Plan of care discussed, verbalized understanding. Questions answered        Problem: Fall Risk  Goal: Patient will remain free from falls  Outcome: Progressing     Problem: Skin Integrity  Goal: Skin integrity is maintained or improved  Outcome: Progressing     Problem: Pain - Standard  Goal: Alleviation of pain or a reduction in pain to the patient’s comfort goal  Outcome: Progressing  Note: Patient educated on pain scale and to notify RN of pain. Medicated per MAR and repositioned        Patient is not progressing towards the following goals:

## 2024-02-20 NOTE — PROGRESS NOTES
[facility] Medicine Daily Progress Note    Date of Service  2/20/2024    Chief Complaint  Kristin Balderrama is a 33 y.o. female admitted 2/18/2024 with   Chief Complaint   Patient presents with    Fever    Nausea    Weakness    Dizziness     PT is having an increase in her starting this morning which she's states was 104.7. Pt is also having progressive weakness, nausea, and dizziness which she says started on Friday.         Hospital Course  This 33-year-old female with a past medical history significant for nephrolithiasis, patient had a laser lithotripsy and ureteral stent replacement on 2/12 presented to the ER on 2/18/2024 with fever, nausea, dizziness.    Patient stated that she has generalized weakness, fever, chills, vomiting, nausea, right-sided flank pain thus had to come to ER.  She checked the temperature prior to arrival and found to be 104.      Open presents in ER, she is noted to be febrile with a temperature of 101, tachycardic with heart rate of 100, tachypneic with respirate of 24 WBC 7.3, platelet 109, potassium 3.4; UA consistent with UTI.  Blood cultures x 2 ordered, UA and urine culture ordered.  Patient was started on Cefepime . CT  Renal showing Stable right ureteral stent. No hydronephrosis. Tiny nonobstructing right renal stones.     Interval events:  --- No acute events overnight, overnight, patient was febrile with temperature as high as 101  -Blood cultures ordered, pending, urine culture ordered, pending, patient was started on broad-spectrum antibiotics including cefepime.  --It is noted that patient has accidentally pulled out her stent,  and she was noted to be in intractable amount of pain  --Will try to pull out the stent since the white ring is noted at the meatus.  -- Urology Dr Holm contacted, his PAC will be in tomorrow to evaluate the patient.    2/20 patient is new to me today, patient is c/o nausea and vomiting, patient's pain is stable, patient no fever, I have ordered  EKG and reviewed qtc is prolonged chronic 608, will check Mg, monitoring EKG in am.     I have discussed this patient's plan of care and discharge plan at IDT rounds today with Case Management, Nursing, Nursing leadership, and other members of the IDT team.    Consultants/Specialty  urology    Code Status  Full Code    Disposition  The patient is not medically cleared for discharge to home or a post-acute facility.      I have placed the appropriate orders for post-discharge needs.    Review of Systems  Review of Systems   Constitutional:  Negative for malaise/fatigue.   HENT:  Negative for congestion.    Eyes:  Negative for blurred vision and double vision.   Respiratory:  Negative for cough, hemoptysis and sputum production.    Cardiovascular:  Negative for chest pain and palpitations.   Gastrointestinal:  Positive for nausea and vomiting. Negative for heartburn.   Genitourinary:  Positive for dysuria, flank pain, frequency and urgency.   Musculoskeletal:  Positive for joint pain.   Neurological:  Negative for headaches.   Psychiatric/Behavioral:  The patient is not nervous/anxious.         Physical Exam  Temp:  [36 °C (96.8 °F)-36.5 °C (97.7 °F)] 36 °C (96.8 °F)  Pulse:  [85-94] 88  Resp:  [18-20] 18  BP: (125-172)/(69-96) 158/93  SpO2:  [95 %-99 %] 97 %    Physical Exam  Vitals and nursing note reviewed.   Constitutional:       Appearance: She is obese.   HENT:      Head: Normocephalic and atraumatic.   Eyes:      General: No scleral icterus.     Extraocular Movements: Extraocular movements intact.      Pupils: Pupils are equal, round, and reactive to light.   Cardiovascular:      Rate and Rhythm: Normal rate.   Pulmonary:      Effort: No respiratory distress.      Breath sounds: Normal breath sounds.   Abdominal:      Tenderness: There is right CVA tenderness.   Musculoskeletal:      Cervical back: Neck supple.      Right lower leg: No edema.      Left lower leg: No edema.   Skin:     General: Skin is warm.    Neurological:      Mental Status: She is alert.      Cranial Nerves: No cranial nerve deficit.         Fluids    Intake/Output Summary (Last 24 hours) at 2/20/2024 1424  Last data filed at 2/20/2024 0837  Gross per 24 hour   Intake 2205 ml   Output 2075 ml   Net 130 ml       Laboratory  Recent Labs     02/18/24  1630 02/19/24  0429 02/20/24  0330   WBC 7.3 5.2 4.0*   RBC 4.00* 3.46* 3.56*   HEMOGLOBIN 12.9 11.2* 11.2*   HEMATOCRIT 36.3* 31.1* 31.7*   MCV 90.8 89.9 89.0   MCH 32.3 32.4 31.5   MCHC 35.5 36.0* 35.3   RDW 41.9 41.0 40.1   PLATELETCT 119* 107* 129*   MPV 11.7 11.6 11.5     Recent Labs     02/18/24  1630 02/19/24  0429 02/20/24  0330   SODIUM 137 135 136   POTASSIUM 3.4* 4.1 3.5*   CHLORIDE 107 111 109   CO2 16* 13* 15*   GLUCOSE 127* 129* 113*   BUN 10 10 9   CREATININE 0.90 0.55 0.61   CALCIUM 8.8 8.3* 8.7                   Imaging  IR-US GUIDED PIV   Final Result    Ultrasound-guided PERIPHERAL IV INSERTION performed by    qualified nursing staff as above.      CT-RENAL COLIC EVALUATION(A/P W/O)   Final Result         1.  Stable right ureteral stent. No hydronephrosis. Tiny nonobstructing right renal stones.   2.  No significant interval change. No new abnormality.               DX-CHEST-PORTABLE (1 VIEW)   Final Result      No acute cardiopulmonary abnormality.           Assessment/Plan  * Pyelonephritis- (present on admission)  Assessment & Plan  Patient with recent ureteral stent placement on February 12  -- She noted to be febrile, tachycardic, noted to have dysuria, increased frequency   -- UA consistent with UTI, blood cultures x 2, urine culture ordered, pending, follow   -- CT renal showing intact right ureteral stent  Will continue cefepime.    Later in the day, it is noted that patient CNA accidentally pulled out stent, reach out to Dr. Holm; who reca pulling out since white ring was noted in meatus       Continue iv abx.   F/u final cx result.     Thrombocytopenia (HCC)- (present on  admission)  Assessment & Plan  At 107, monitor, not actively bleeding.    129 today.     Hypokalemia- (present on admission)  Assessment & Plan  Monitoring   Checking Mg   Replacing k today.       Metabolic acidosis- (present on admission)  Assessment & Plan  Likely due to starvation   Continue iv fluids  Cld  Po bicarb  Close monitoring.     Schizophrenia ([facility])- (present on admission)  Assessment & Plan  Continue home meds    Severe obesity (BMI >= 40) ([facility])- (present on admission)  Assessment & Plan  BMI 44  Lifestyle recently diet exercise and weight loss    Borderline personality disorder ([facility])  Assessment & Plan  Continue home medications         VTE prophylaxis: lovenox      I have reviewed EKG and interpreted.  I have reviewed cbc, bmp.   I have reviewed note for urologist  Patient on iv cefepime, monitoring for side effects including but no limited to encephalopathy.   I have ordered Mg level  I have ordered labs for in am.   Repeat EKG in in am.       Total time of 52 minutes spent prepping to see patient (e.g. reviewing  tests/imaging results, notes from consultants, bedside nurse, night shift ) obtaining and/or reviewing separately obtained history. Performing a medically appropriate examination and evaluation.  Counseling and educating the patient.  Ordering medications, tests, or procedures.  Referring and communicating with other health care professionals.  Documenting clinical information in EPIC.  Independently interpreting results and communicating results to patient.  Care coordination.

## 2024-02-20 NOTE — PROGRESS NOTES
3rd episode of vomiting d/w Dr. Edmondson will add additional anti nausea medication, aware oral medications are being held until she can tolerate po medication.

## 2024-02-21 ENCOUNTER — APPOINTMENT (OUTPATIENT)
Dept: RADIOLOGY | Facility: MEDICAL CENTER | Age: 34
DRG: 690 | End: 2024-02-21
Attending: HOSPITALIST
Payer: MEDICARE

## 2024-02-21 LAB
ALBUMIN SERPL BCP-MCNC: 3.7 G/DL (ref 3.2–4.9)
ALBUMIN SERPL BCP-MCNC: 3.8 G/DL (ref 3.2–4.9)
ALBUMIN/GLOB SERPL: 1.5 G/DL
ALBUMIN/GLOB SERPL: 1.5 G/DL
ALP SERPL-CCNC: 60 U/L (ref 30–99)
ALP SERPL-CCNC: 60 U/L (ref 30–99)
ALT SERPL-CCNC: 32 U/L (ref 2–50)
ALT SERPL-CCNC: 35 U/L (ref 2–50)
ANION GAP SERPL CALC-SCNC: 13 MMOL/L (ref 7–16)
ANION GAP SERPL CALC-SCNC: 14 MMOL/L (ref 7–16)
AST SERPL-CCNC: 20 U/L (ref 12–45)
AST SERPL-CCNC: 24 U/L (ref 12–45)
BILIRUB SERPL-MCNC: 0.4 MG/DL (ref 0.1–1.5)
BILIRUB SERPL-MCNC: 0.5 MG/DL (ref 0.1–1.5)
BUN SERPL-MCNC: 10 MG/DL (ref 8–22)
BUN SERPL-MCNC: 9 MG/DL (ref 8–22)
CALCIUM ALBUM COR SERPL-MCNC: 9.2 MG/DL (ref 8.5–10.5)
CALCIUM ALBUM COR SERPL-MCNC: 9.5 MG/DL (ref 8.5–10.5)
CALCIUM SERPL-MCNC: 9 MG/DL (ref 8.5–10.5)
CALCIUM SERPL-MCNC: 9.3 MG/DL (ref 8.5–10.5)
CHLORIDE SERPL-SCNC: 109 MMOL/L (ref 96–112)
CHLORIDE SERPL-SCNC: 109 MMOL/L (ref 96–112)
CO2 SERPL-SCNC: 17 MMOL/L (ref 20–33)
CO2 SERPL-SCNC: 18 MMOL/L (ref 20–33)
CREAT SERPL-MCNC: 0.62 MG/DL (ref 0.5–1.4)
CREAT SERPL-MCNC: 0.67 MG/DL (ref 0.5–1.4)
EKG IMPRESSION: NORMAL
EKG IMPRESSION: NORMAL
ERYTHROCYTE [DISTWIDTH] IN BLOOD BY AUTOMATED COUNT: 40.1 FL (ref 35.9–50)
GFR SERPLBLD CREATININE-BSD FMLA CKD-EPI: 117 ML/MIN/1.73 M 2
GFR SERPLBLD CREATININE-BSD FMLA CKD-EPI: 120 ML/MIN/1.73 M 2
GLOBULIN SER CALC-MCNC: 2.4 G/DL (ref 1.9–3.5)
GLOBULIN SER CALC-MCNC: 2.6 G/DL (ref 1.9–3.5)
GLUCOSE SERPL-MCNC: 142 MG/DL (ref 65–99)
GLUCOSE SERPL-MCNC: 161 MG/DL (ref 65–99)
HCT VFR BLD AUTO: 33.5 % (ref 37–47)
HGB BLD-MCNC: 12.1 G/DL (ref 12–16)
MAGNESIUM SERPL-MCNC: 2 MG/DL (ref 1.5–2.5)
MAGNESIUM SERPL-MCNC: 2.1 MG/DL (ref 1.5–2.5)
MCH RBC QN AUTO: 31.6 PG (ref 27–33)
MCHC RBC AUTO-ENTMCNC: 36.1 G/DL (ref 32.2–35.5)
MCV RBC AUTO: 87.5 FL (ref 81.4–97.8)
PLATELET # BLD AUTO: 179 K/UL (ref 164–446)
PMV BLD AUTO: 11.2 FL (ref 9–12.9)
POTASSIUM SERPL-SCNC: 3.3 MMOL/L (ref 3.6–5.5)
POTASSIUM SERPL-SCNC: 3.7 MMOL/L (ref 3.6–5.5)
PROT SERPL-MCNC: 6.1 G/DL (ref 6–8.2)
PROT SERPL-MCNC: 6.4 G/DL (ref 6–8.2)
RBC # BLD AUTO: 3.83 M/UL (ref 4.2–5.4)
SODIUM SERPL-SCNC: 139 MMOL/L (ref 135–145)
SODIUM SERPL-SCNC: 141 MMOL/L (ref 135–145)
TROPONIN T SERPL-MCNC: 8 NG/L (ref 6–19)
WBC # BLD AUTO: 3.9 K/UL (ref 4.8–10.8)

## 2024-02-21 PROCEDURE — 700102 HCHG RX REV CODE 250 W/ 637 OVERRIDE(OP): Performed by: HOSPITALIST

## 2024-02-21 PROCEDURE — A9270 NON-COVERED ITEM OR SERVICE: HCPCS | Performed by: HOSPITALIST

## 2024-02-21 PROCEDURE — 84484 ASSAY OF TROPONIN QUANT: CPT

## 2024-02-21 PROCEDURE — 85027 COMPLETE CBC AUTOMATED: CPT

## 2024-02-21 PROCEDURE — 80053 COMPREHEN METABOLIC PANEL: CPT

## 2024-02-21 PROCEDURE — 93005 ELECTROCARDIOGRAM TRACING: CPT | Performed by: HOSPITALIST

## 2024-02-21 PROCEDURE — 99232 SBSQ HOSP IP/OBS MODERATE 35: CPT | Performed by: HOSPITALIST

## 2024-02-21 PROCEDURE — 770006 HCHG ROOM/CARE - MED/SURG/GYN SEMI*

## 2024-02-21 PROCEDURE — 93010 ELECTROCARDIOGRAM REPORT: CPT | Performed by: INTERNAL MEDICINE

## 2024-02-21 PROCEDURE — 71045 X-RAY EXAM CHEST 1 VIEW: CPT

## 2024-02-21 PROCEDURE — 700111 HCHG RX REV CODE 636 W/ 250 OVERRIDE (IP): Performed by: STUDENT IN AN ORGANIZED HEALTH CARE EDUCATION/TRAINING PROGRAM

## 2024-02-21 PROCEDURE — A9270 NON-COVERED ITEM OR SERVICE: HCPCS | Performed by: STUDENT IN AN ORGANIZED HEALTH CARE EDUCATION/TRAINING PROGRAM

## 2024-02-21 PROCEDURE — 700111 HCHG RX REV CODE 636 W/ 250 OVERRIDE (IP): Performed by: HOSPITALIST

## 2024-02-21 PROCEDURE — 83735 ASSAY OF MAGNESIUM: CPT

## 2024-02-21 PROCEDURE — 93010 ELECTROCARDIOGRAM REPORT: CPT | Performed by: STUDENT IN AN ORGANIZED HEALTH CARE EDUCATION/TRAINING PROGRAM

## 2024-02-21 PROCEDURE — 700102 HCHG RX REV CODE 250 W/ 637 OVERRIDE(OP): Performed by: STUDENT IN AN ORGANIZED HEALTH CARE EDUCATION/TRAINING PROGRAM

## 2024-02-21 RX ORDER — NITROGLYCERIN 0.4 MG/1
0.4 TABLET SUBLINGUAL ONCE
Status: COMPLETED | OUTPATIENT
Start: 2024-02-21 | End: 2024-02-21

## 2024-02-21 RX ORDER — POTASSIUM CHLORIDE 7.45 MG/ML
10 INJECTION INTRAVENOUS
Status: DISPENSED | OUTPATIENT
Start: 2024-02-21 | End: 2024-02-21

## 2024-02-21 RX ORDER — POTASSIUM CHLORIDE 7.45 MG/ML
10 INJECTION INTRAVENOUS
Qty: 200 ML | Refills: 0 | Status: DISPENSED | OUTPATIENT
Start: 2024-02-21 | End: 2024-02-21

## 2024-02-21 RX ADMIN — SODIUM CHLORIDE 1 G: 1 TABLET ORAL at 05:46

## 2024-02-21 RX ADMIN — IVABRADINE 7.5 MG: 7.5 TABLET, FILM COATED ORAL at 08:32

## 2024-02-21 RX ADMIN — LAMOTRIGINE 50 MG: 100 TABLET ORAL at 20:15

## 2024-02-21 RX ADMIN — POTASSIUM CHLORIDE 10 MEQ: 7.46 INJECTION, SOLUTION INTRAVENOUS at 17:03

## 2024-02-21 RX ADMIN — SODIUM CHLORIDE 1 G: 1 TABLET ORAL at 13:09

## 2024-02-21 RX ADMIN — ZIPRASIDONE HYDROCHLORIDE 40 MG: 40 CAPSULE ORAL at 20:15

## 2024-02-21 RX ADMIN — POTASSIUM CHLORIDE 10 MEQ: 7.46 INJECTION, SOLUTION INTRAVENOUS at 08:35

## 2024-02-21 RX ADMIN — TIZANIDINE 4 MG: 4 TABLET ORAL at 09:08

## 2024-02-21 RX ADMIN — IVABRADINE 7.5 MG: 7.5 TABLET, FILM COATED ORAL at 17:01

## 2024-02-21 RX ADMIN — LAMOTRIGINE 50 MG: 100 TABLET ORAL at 09:07

## 2024-02-21 RX ADMIN — OXYCODONE 5 MG: 5 TABLET ORAL at 15:30

## 2024-02-21 RX ADMIN — METOPROLOL SUCCINATE 25 MG: 25 TABLET, EXTENDED RELEASE ORAL at 05:46

## 2024-02-21 RX ADMIN — OXYCODONE 5 MG: 5 TABLET ORAL at 01:06

## 2024-02-21 RX ADMIN — TIZANIDINE 4 MG: 4 TABLET ORAL at 20:15

## 2024-02-21 RX ADMIN — SODIUM CHLORIDE 1 G: 1 TABLET ORAL at 20:15

## 2024-02-21 RX ADMIN — NITROGLYCERIN 0.4 MG: 0.4 TABLET, ORALLY DISINTEGRATING SUBLINGUAL at 20:58

## 2024-02-21 RX ADMIN — SODIUM BICARBONATE 1300 MG: 650 TABLET ORAL at 08:32

## 2024-02-21 RX ADMIN — CEFEPIME 2 G: 2 INJECTION, POWDER, FOR SOLUTION INTRAVENOUS at 15:36

## 2024-02-21 RX ADMIN — SODIUM BICARBONATE 1300 MG: 650 TABLET ORAL at 15:31

## 2024-02-21 RX ADMIN — ENOXAPARIN SODIUM 40 MG: 100 INJECTION SUBCUTANEOUS at 17:01

## 2024-02-21 RX ADMIN — SODIUM BICARBONATE 1300 MG: 650 TABLET ORAL at 20:15

## 2024-02-21 ASSESSMENT — COGNITIVE AND FUNCTIONAL STATUS - GENERAL
DRESSING REGULAR UPPER BODY CLOTHING: A LITTLE
TURNING FROM BACK TO SIDE WHILE IN FLAT BAD: A LITTLE
MOVING TO AND FROM BED TO CHAIR: A LOT
MOVING FROM LYING ON BACK TO SITTING ON SIDE OF FLAT BED: A LITTLE
HELP NEEDED FOR BATHING: A LITTLE
TOILETING: A LOT
MOVING TO AND FROM BED TO CHAIR: A LITTLE
SUGGESTED CMS G CODE MODIFIER MOBILITY: CL
MOBILITY SCORE: 14
MOVING FROM LYING ON BACK TO SITTING ON SIDE OF FLAT BED: A LOT
EATING MEALS: A LITTLE
DRESSING REGULAR LOWER BODY CLOTHING: A LITTLE
CLIMB 3 TO 5 STEPS WITH RAILING: A LOT
SUGGESTED CMS G CODE MODIFIER DAILY ACTIVITY: CK
DAILY ACTIVITIY SCORE: 17
STANDING UP FROM CHAIR USING ARMS: A LOT
TURNING FROM BACK TO SIDE WHILE IN FLAT BAD: A LITTLE
PERSONAL GROOMING: A LITTLE
WALKING IN HOSPITAL ROOM: A LITTLE

## 2024-02-21 ASSESSMENT — ENCOUNTER SYMPTOMS
BLURRED VISION: 0
COUGH: 0
HEARTBURN: 0
SPUTUM PRODUCTION: 0
PALPITATIONS: 0
NAUSEA: 1
FLANK PAIN: 1
VOMITING: 1
NERVOUS/ANXIOUS: 0
HEADACHES: 0
DOUBLE VISION: 0
HEMOPTYSIS: 0

## 2024-02-21 ASSESSMENT — PAIN DESCRIPTION - PAIN TYPE
TYPE: ACUTE PAIN

## 2024-02-21 NOTE — CARE PLAN
Problem: Knowledge Deficit - Standard  Goal: Patient and family/care givers will demonstrate understanding of plan of care, disease process/condition, diagnostic tests and medications  Outcome: Progressing     Problem: Fall Risk  Goal: Patient will remain free from falls  Outcome: Progressing     Problem: Skin Integrity  Goal: Skin integrity is maintained or improved  Outcome: Progressing     Problem: Pain - Standard  Goal: Alleviation of pain or a reduction in pain to the patient’s comfort goal  Outcome: Progressing   The patient is Stable - Low risk of patient condition declining or worsening    Shift Goals  Clinical Goals: Safety,Cultures  Patient Goals: Cultures  Family Goals: Not present    Progress made toward(s) clinical / shift goals:      Patient is not progressing towards the following goals:

## 2024-02-21 NOTE — PROGRESS NOTES
[facility] Medicine Daily Progress Note    Date of Service  2/21/2024    Chief Complaint  Kristin Balderrama is a 33 y.o. female admitted 2/18/2024 with   Chief Complaint   Patient presents with    Fever    Nausea    Weakness    Dizziness     PT is having an increase in her starting this morning which she's states was 104.7. Pt is also having progressive weakness, nausea, and dizziness which she says started on Friday.         Hospital Course  This 33-year-old female with a past medical history significant for nephrolithiasis, patient had a laser lithotripsy and ureteral stent replacement on 2/12 presented to the ER on 2/18/2024 with fever, nausea, dizziness.    Patient stated that she has generalized weakness, fever, chills, vomiting, nausea, right-sided flank pain thus had to come to ER.  She checked the temperature prior to arrival and found to be 104.      Open presents in ER, she is noted to be febrile with a temperature of 101, tachycardic with heart rate of 100, tachypneic with respirate of 24 WBC 7.3, platelet 109, potassium 3.4; UA consistent with UTI.  Blood cultures x 2 ordered, UA and urine culture ordered.  Patient was started on Cefepime . CT  Renal showing Stable right ureteral stent. No hydronephrosis. Tiny nonobstructing right renal stones.     Interval events:  --- No acute events overnight, overnight, patient was febrile with temperature as high as 101  -Blood cultures ordered, pending, urine culture ordered, pending, patient was started on broad-spectrum antibiotics including cefepime.  --It is noted that patient has accidentally pulled out her stent,  and she was noted to be in intractable amount of pain  --Will try to pull out the stent since the white ring is noted at the meatus.  -- Urology Dr Holm contacted, his PAC will be in tomorrow to evaluate the patient.    2/20 patient is new to me today, patient is c/o nausea and vomiting, patient's pain is stable, patient no fever, I have ordered  EKG and reviewed qtc is prolonged chronic 608, will check Mg, monitoring EKG in am.   2/21 patient in bed, feels better, started to tolerate diet, will advance diet to soft today, continue iv abx for today, notes from urologist reviewed, likely transition to po abx in am if she is able to tolerate po intake.   EKG reviewed qtc 489    I have discussed this patient's plan of care and discharge plan at IDT rounds today with Case Management, Nursing, Nursing leadership, and other members of the IDT team.    Consultants/Specialty  urology    Code Status  Full Code    Disposition  The patient is not medically cleared for discharge to home or a post-acute facility.      I have placed the appropriate orders for post-discharge needs.    Review of Systems  Review of Systems   Constitutional:  Negative for malaise/fatigue.   HENT:  Negative for congestion.    Eyes:  Negative for blurred vision and double vision.   Respiratory:  Negative for cough, hemoptysis and sputum production.    Cardiovascular:  Negative for chest pain and palpitations.   Gastrointestinal:  Positive for nausea (improved.) and vomiting (improved). Negative for heartburn.   Genitourinary:  Positive for flank pain. Negative for dysuria, frequency and urgency.   Musculoskeletal:  Positive for joint pain.   Neurological:  Negative for headaches.   Psychiatric/Behavioral:  The patient is not nervous/anxious.         Physical Exam  Temp:  [36.3 °C (97.4 °F)-36.9 °C (98.4 °F)] 36.5 °C (97.7 °F)  Pulse:  [75-78] 78  Resp:  [16-17] 16  BP: (139-157)/(83-96) 157/83  SpO2:  [94 %-96 %] 95 %    Physical Exam  Vitals and nursing note reviewed.   Constitutional:       Appearance: She is obese.   HENT:      Head: Normocephalic and atraumatic.   Eyes:      General: No scleral icterus.     Extraocular Movements: Extraocular movements intact.      Pupils: Pupils are equal, round, and reactive to light.   Cardiovascular:      Rate and Rhythm: Normal rate.   Pulmonary:       Effort: No respiratory distress.      Breath sounds: Normal breath sounds.   Abdominal:      Tenderness: There is right CVA tenderness.   Musculoskeletal:      Cervical back: Neck supple.      Right lower leg: No edema.      Left lower leg: No edema.   Skin:     General: Skin is warm.   Neurological:      Mental Status: She is alert.      Cranial Nerves: No cranial nerve deficit.         Fluids    Intake/Output Summary (Last 24 hours) at 2/21/2024 1338  Last data filed at 2/20/2024 1931  Gross per 24 hour   Intake --   Output 700 ml   Net -700 ml       Laboratory  Recent Labs     02/19/24 0429 02/20/24  0330 02/21/24  0326   WBC 5.2 4.0* 3.9*   RBC 3.46* 3.56* 3.83*   HEMOGLOBIN 11.2* 11.2* 12.1   HEMATOCRIT 31.1* 31.7* 33.5*   MCV 89.9 89.0 87.5   MCH 32.4 31.5 31.6   MCHC 36.0* 35.3 36.1*   RDW 41.0 40.1 40.1   PLATELETCT 107* 129* 179   MPV 11.6 11.5 11.2     Recent Labs     02/19/24 0429 02/20/24  0330 02/21/24  0326   SODIUM 135 136 141   POTASSIUM 4.1 3.5* 3.3*   CHLORIDE 111 109 109   CO2 13* 15* 18*   GLUCOSE 129* 113* 142*   BUN 10 9 9   CREATININE 0.55 0.61 0.62   CALCIUM 8.3* 8.7 9.0                   Imaging  IR-US GUIDED PIV   Final Result    Ultrasound-guided PERIPHERAL IV INSERTION performed by    qualified nursing staff as above.      CT-RENAL COLIC EVALUATION(A/P W/O)   Final Result         1.  Stable right ureteral stent. No hydronephrosis. Tiny nonobstructing right renal stones.   2.  No significant interval change. No new abnormality.               DX-CHEST-PORTABLE (1 VIEW)   Final Result      No acute cardiopulmonary abnormality.      IR-US GUIDED PIV    (Results Pending)        Assessment/Plan  * Pyelonephritis- (present on admission)  Assessment & Plan  Patient with recent ureteral stent placement on February 12  -- She noted to be febrile, tachycardic, noted to have dysuria, increased frequency   -- UA consistent with UTI, blood cultures x 2, urine culture ordered, pending, follow   -- CT  renal showing intact right ureteral stent  Will continue cefepime.    Later in the day, it is noted that patient CNA accidentally pulled out stent, reach out to Dr. Holm; who reca pulling out since white ring was noted in meatus           Pain improved.   Iv abx for today and transition to po in am    Thrombocytopenia (HCC)- (present on admission)  Assessment & Plan  At 107, monitor, not actively bleeding.    179 today.     Prolonged Q-T interval on ECG- (present on admission)  Assessment & Plan  EKG reviewed today.   Qtc 608   Mg wnl.   Replacing K  today      Hypokalemia- (present on admission)  Assessment & Plan  Replacing K today.   F/u In am  Mg is wnl       Metabolic acidosis- (present on admission)  Assessment & Plan  Likely due to starvation   Continue iv fluids  Cld  Po bicarb  Close monitoring.     Schizophrenia ([facility])- (present on admission)  Assessment & Plan  Continue home meds    Severe obesity (BMI >= 40) ([facility])- (present on admission)  Assessment & Plan  BMI 44  Lifestyle recently diet exercise and weight loss    Borderline personality disorder ([facility])  Assessment & Plan  Continue home medications         VTE prophylaxis: lovenox

## 2024-02-21 NOTE — DISCHARGE PLANNING
RN CM met with patient at bedside to complete admission assessment. Patient lives in single story home with ramp to enter.   She lives with her grandmother and aunt. Her grandmother has health issues and her aunt works long hours during the day.   Patient is disabled and receives $900/month from SSD.   She owns [employer], [employer] and bedside commode.   She was previously on service with [facility] and is agreeable to return to them if indicated.   She does not use oxygen at baseline.   She does not drive and uses MTM or taxis to go to appointments and such.   No history of substance abuse.   History of schizophrenia for which she is on Geodon. Reports being in remission. Was hospitalized at [facility] inpatient psych unit 3 years ago for a depressive episode. She is not experiencing any symptoms of psychosis.   Pending therapy recommendations and medical clearance.   RN CM to remain available for discharge needs.     Case Management Discharge Planning    Admission Date: 2/18/2024  GMLOS: 2.9  ALOS: 3    6-Clicks ADL Score: 17  6-Clicks Mobility Score: 14    Anticipated Discharge Dispo: Discharge Disposition: D/T to home under A care in anticipation of covered skilled care (06)  Discharge Address: [address] [place] [address]  Discharge Contact Phone Number: [phone]    DME Needed: No    Action(s) Taken: Updated Provider/Nurse on Discharge Plan, Patient Conference, and DC Assessment Complete (See below)    Escalations Completed: None    Medically Clear: No    Next Steps: Pending recommendations    Barriers to Discharge: Medical clearance and Pending PT Evaluation    Is the patient up for discharge tomorrow: No    Care Transition Team Assessment    Information Source  Orientation Level: Oriented X4  Information Given By: Patient  Informant's Name: Kristin  Who is responsible for making decisions for patient? : Patient    Readmission Evaluation  Is this a readmission?: No    Elopement Risk  Legal Hold:  No  Ambulatory or Self Mobile in Wheelchair: Yes  Disoriented: No  Psychiatric Symptoms: None  History of Wandering: No  Elopement this Admit: No  Vocalizing Wanting to Leave: No  Displays Behaviors, Body Language Wanting to Leave: No-Not at Risk for Elopement  Elopement Risk: Not at Risk for Elopement    Interdisciplinary Discharge Planning  Does Admitting Nurse Feel This Could be a Complex Discharge?: Yes  Primary Care Physician: Dr. Torres Brody in [facility]  Patient or legal guardian wants to designate a caregiver: No    Discharge Preparedness  What is your plan after discharge?: Home health care  What are your discharge supports?: Grandparent  Prior Functional Level: Ambulatory, Needs Assist with Activities of Daily Living, Independent with Medication Management, Uses Walker  Difficulity with ADLs: None  Difficulity with IADLs: Driving  Difficulity with IADL Comments: Uses MTM    Functional Assesment  Prior Functional Level: Ambulatory, Needs Assist with Activities of Daily Living, Independent with Medication Management, Uses Walker    Finances  Financial Barriers to Discharge: Yes  Average Monthly Income: 800 $  Source of Income: Social Security Disability  Prescription Coverage: Yes    Vision / Hearing Impairment  Right Eye Vision: Impaired, Wears Glasses  Left Eye Vision: Impaired, Wears Glasses    Values / Beliefs / Concerns  Values / Beliefs Concerns : No    Advance Directive  Advance Directive?: RICARDA LLANES for Health Care  Durable Power of  Name and Contact : Vivienne Marieliliana, [phone]    Domestic Abuse  Have you ever been the victim of abuse or violence?: No    Psychological Assessment  History of Substance Abuse: None  History of Psychiatric Problems: Yes  Non-compliant with Treatment: No  Newly Diagnosed Illness: Yes    Discharge Risks or Barriers  Discharge risks or barriers?: Complex medical needs  Patient risk factors: Complex medical needs, Vulnerable adult    Anticipated Discharge  Information  Discharge Disposition: D/T to home under A care in anticipation of covered skilled care (06)  Discharge Address: [address] [place] [address]  Discharge Contact Phone Number: [phone]

## 2024-02-21 NOTE — PROGRESS NOTES
2035: gave patient Oral phenergan for nausea    2045: patient had 1 episode of emesis    [facility] since ordered medication wasn't working for patient.     2100: Logan PUGA, ordered scopolamine patch to try for patient.     2136: placed scopolamine patch on patient after getting it tubed up from pharmacy.     2229: gave patient night time medication after stated they were feeling nauseous anymore.

## 2024-02-21 NOTE — PROGRESS NOTES
"Urology Progress Note            S: Seen and examined. Patient doing well today. Reports that she feels \"100 percent better\". No fevers, chills, nausea or vomiting.      O:   BP (!) 157/83   Pulse 78   Temp 36.5 °C (97.7 °F) (Temporal)   Resp 16   Ht 1.626 m (5' 4\")   Wt 119 kg (262 lb 5.6 oz)   SpO2 95%   Recent Labs     02/19/24 0429 02/20/24  0330 02/21/24  0326   SODIUM 135 136 141   POTASSIUM 4.1 3.5* 3.3*   CHLORIDE 111 109 109   CO2 13* 15* 18*   GLUCOSE 129* 113* 142*   BUN 10 9 9   CREATININE 0.55 0.61 0.62   CALCIUM 8.3* 8.7 9.0     Recent Labs     02/19/24 0429 02/20/24 0330 02/21/24  0326   WBC 5.2 4.0* 3.9*   RBC 3.46* 3.56* 3.83*   HEMOGLOBIN 11.2* 11.2* 12.1   HEMATOCRIT 31.1* 31.7* 33.5*   MCV 89.9 89.0 87.5   MCH 32.4 31.5 31.6   MCHC 36.0* 35.3 36.1*   RDW 41.0 40.1 40.1   PLATELETCT 107* 129* 179   MPV 11.6 11.5 11.2         Intake/Output Summary (Last 24 hours) at 2/21/2024 0920  Last data filed at 2/20/2024 1931  Gross per 24 hour   Intake --   Output 700 ml   Net -700 ml       Exam:  Gen:NAD  ABD: Abdomen soft, no TTP.         A/P:    Active Hospital Problems    Diagnosis     Pyelonephritis [N12]     Thrombocytopenia (HCC) [D69.6]     Prolonged Q-T interval on ECG [R94.31]     Hypokalemia [E87.6]     Metabolic acidosis [E87.20]     Schizophrenia (HCC) [F20.9]     Severe obesity (BMI >= 40) ([facility]) [E66.01]     Borderline personality disorder ([facility]) [F60.3]      32 yo F admitted for fever/chills, n/v POD 8 from [facility] 2/12. Pt was on post operative abx, bactrim. Hx of ESBL E.Coli and pt with multiple allergies. UA nitrate positive. Pt has stabilized and improved with IV abx maxipime. H/H 11.2/31.7(13.9/36) low platelets. Pts recent hx of gross hematuria could partially explain this drop.       Plan:  - continue abx per hospital team, appreciate their cares and inputs.   - no surgical interventions planned at this time.   - urology will anticipate seeing as a outpatient. [facility] [place] " will help facilitate follow up.  - urology signing off       CLAY Garcia.   [address] [place] [id number] [phone]

## 2024-02-22 ENCOUNTER — PATIENT OUTREACH (OUTPATIENT)
Dept: SCHEDULING | Facility: IMAGING CENTER | Age: 34
End: 2024-02-22
Payer: MEDICARE

## 2024-02-22 VITALS
SYSTOLIC BLOOD PRESSURE: 140 MMHG | HEIGHT: 64 IN | DIASTOLIC BLOOD PRESSURE: 83 MMHG | HEART RATE: 63 BPM | TEMPERATURE: 98.2 F | RESPIRATION RATE: 16 BRPM | WEIGHT: 262.35 LBS | BODY MASS INDEX: 44.79 KG/M2 | OXYGEN SATURATION: 95 %

## 2024-02-22 PROBLEM — E87.20 METABOLIC ACIDOSIS: Status: RESOLVED | Noted: 2019-08-30 | Resolved: 2024-02-22

## 2024-02-22 PROBLEM — N12 PYELONEPHRITIS: Status: RESOLVED | Noted: 2024-02-18 | Resolved: 2024-02-22

## 2024-02-22 PROBLEM — E87.6 HYPOKALEMIA: Status: RESOLVED | Noted: 2019-09-29 | Resolved: 2024-02-22

## 2024-02-22 PROBLEM — D69.6 THROMBOCYTOPENIA (HCC): Status: RESOLVED | Noted: 2023-02-03 | Resolved: 2024-02-22

## 2024-02-22 LAB
EXTRA TUBE BLU BLU: NORMAL
MAGNESIUM SERPL-MCNC: 2 MG/DL (ref 1.5–2.5)
POTASSIUM SERPL-SCNC: 3.8 MMOL/L (ref 3.6–5.5)
TROPONIN T SERPL-MCNC: 8 NG/L (ref 6–19)

## 2024-02-22 PROCEDURE — 700111 HCHG RX REV CODE 636 W/ 250 OVERRIDE (IP): Mod: JZ | Performed by: STUDENT IN AN ORGANIZED HEALTH CARE EDUCATION/TRAINING PROGRAM

## 2024-02-22 PROCEDURE — 99239 HOSP IP/OBS DSCHRG MGMT >30: CPT | Performed by: HOSPITALIST

## 2024-02-22 PROCEDURE — 84132 ASSAY OF SERUM POTASSIUM: CPT

## 2024-02-22 PROCEDURE — 700102 HCHG RX REV CODE 250 W/ 637 OVERRIDE(OP): Performed by: STUDENT IN AN ORGANIZED HEALTH CARE EDUCATION/TRAINING PROGRAM

## 2024-02-22 PROCEDURE — 700105 HCHG RX REV CODE 258: Performed by: STUDENT IN AN ORGANIZED HEALTH CARE EDUCATION/TRAINING PROGRAM

## 2024-02-22 PROCEDURE — A9270 NON-COVERED ITEM OR SERVICE: HCPCS | Performed by: HOSPITALIST

## 2024-02-22 PROCEDURE — 700102 HCHG RX REV CODE 250 W/ 637 OVERRIDE(OP)

## 2024-02-22 PROCEDURE — A9270 NON-COVERED ITEM OR SERVICE: HCPCS | Performed by: STUDENT IN AN ORGANIZED HEALTH CARE EDUCATION/TRAINING PROGRAM

## 2024-02-22 PROCEDURE — 83735 ASSAY OF MAGNESIUM: CPT

## 2024-02-22 PROCEDURE — 700102 HCHG RX REV CODE 250 W/ 637 OVERRIDE(OP): Performed by: HOSPITALIST

## 2024-02-22 PROCEDURE — A9270 NON-COVERED ITEM OR SERVICE: HCPCS

## 2024-02-22 RX ORDER — CHOLECALCIFEROL (VITAMIN D3) 125 MCG
10 CAPSULE ORAL NIGHTLY
Status: DISCONTINUED | OUTPATIENT
Start: 2024-02-22 | End: 2024-02-22 | Stop reason: HOSPADM

## 2024-02-22 RX ORDER — OXYCODONE HYDROCHLORIDE 5 MG/1
5 TABLET ORAL EVERY 8 HOURS PRN
Qty: 9 TABLET | Refills: 0 | Status: SHIPPED | OUTPATIENT
Start: 2024-02-22 | End: 2024-02-25

## 2024-02-22 RX ORDER — AMOXICILLIN AND CLAVULANATE POTASSIUM 875; 125 MG/1; MG/1
1 TABLET, FILM COATED ORAL 2 TIMES DAILY
Qty: 8 TABLET | Refills: 0 | Status: ACTIVE | OUTPATIENT
Start: 2024-02-22 | End: 2024-02-26

## 2024-02-22 RX ADMIN — LAMOTRIGINE 50 MG: 100 TABLET ORAL at 08:53

## 2024-02-22 RX ADMIN — IVABRADINE 7.5 MG: 7.5 TABLET, FILM COATED ORAL at 08:53

## 2024-02-22 RX ADMIN — ANTACID TABLETS 500 MG: 500 TABLET, CHEWABLE ORAL at 09:51

## 2024-02-22 RX ADMIN — Medication 10 MG: at 00:16

## 2024-02-22 RX ADMIN — TIZANIDINE 4 MG: 4 TABLET ORAL at 08:53

## 2024-02-22 RX ADMIN — METOPROLOL SUCCINATE 25 MG: 25 TABLET, EXTENDED RELEASE ORAL at 04:01

## 2024-02-22 RX ADMIN — SODIUM CHLORIDE 1 G: 1 TABLET ORAL at 04:01

## 2024-02-22 RX ADMIN — SODIUM BICARBONATE 1300 MG: 650 TABLET ORAL at 08:52

## 2024-02-22 RX ADMIN — CEFEPIME 2 G: 2 INJECTION, POWDER, FOR SOLUTION INTRAVENOUS at 04:00

## 2024-02-22 ASSESSMENT — PAIN DESCRIPTION - PAIN TYPE
TYPE: ACUTE PAIN
TYPE: ACUTE PAIN;CHRONIC PAIN

## 2024-02-22 NOTE — DISCHARGE PLANNING
Patient medically cleared to discharge home. DC order placed by physician. HH referral placed. HH choice form signed by [facility] which patient was previously on service with. Patient reports her friend may be able to drive her home depending on the time of day.   RN CM to remain available to assist with transportation home if indicated.   IMM delivered and signed by patient at 1029. Faxed to [facility] to upload into media.     Case Management Discharge Planning    Admission Date: 2/18/2024  GMLOS: 2.9  ALOS: 4    6-Clicks ADL Score: 17  6-Clicks Mobility Score: 14    Anticipated Discharge Dispo: Discharge Disposition: D/T to home under [facility] care in anticipation of covered skilled care (06)  Discharge Address: [address] [place] [address]  Discharge Contact Phone Number: [phone]    DME Needed: No    Action(s) Taken: Updated Provider/Nurse on Discharge Plan, Patient Conference, Choice obtained, and Referral(s) sent    Escalations Completed: Provider    Medically Clear: Yes    Next Steps: Pending acceptance with HH. May need case management to help with transportation home.     Barriers to Discharge: None    Is the patient up for discharge tomorrow: No, Today

## 2024-02-22 NOTE — PROGRESS NOTES
Patients IV removed. Patient educated and repeat back understanding. Patient signed all documents with complete understanding. Pt received home meds. Pt will  prescribed medications at preferred pharmacy. Pt left with friend without incident.

## 2024-02-22 NOTE — CARE PLAN
The patient is Stable - Low risk of patient condition declining or worsening    Shift Goals  Clinical Goals: pain control  Patient Goals: pain control  Family Goals: na    Progress made toward(s) clinical / shift goals:  Educated patient on pain rating scales, frequent pain assessments in place, medicating per MAR, non pharmaceutical pain relief such as heat pads and positioning in use.        Patient is not progressing towards the following goals:

## 2024-02-22 NOTE — PROGRESS NOTES
NOC CROSSCOVER    RN reported new left-sided chest pain with difficulty breathing.  Patient reports that she often has this pain.  She does have an extensive cardiac history.  EKG showed prolonged QTc.  Nitro often improves chest pain when she has these episodes.  Nitro was given and chest pain improved.  I have ordered CMP, troponin and mag levels which are pending.    SIDNEY Ruiz

## 2024-02-22 NOTE — DISCHARGE INSTRUCTIONS
Pyelonephritis, Adult    Pyelonephritis is an infection that occurs in the kidney. The kidneys are organs that help clean the blood by moving waste out of the blood and into the pee (urine). This infection can happen quickly, or it can last for a long time. In most cases, it clears up with treatment and does not cause other problems.  What are the causes?  This condition may be caused by:  Germs (bacteria) going from the bladder up to the kidney. This may happen after having a bladder infection.  Germs going from the blood to the kidney.  What increases the risk?  This condition is more likely to develop in:  Pregnant women.  Older people.  People who have any of these conditions:  Diabetes.  Inflammation of the prostate gland (prostatitis), in males.  Kidney stones or bladder stones.  Other problems with the kidney or the parts of your body that carry pee from the kidneys to the bladder (ureters).  Cancer.  People who have a small, thin tube (catheter) placed in the bladder.  People who are sexually active.  Women who use a medicine that kills sperm (spermicide) to prevent pregnancy.  People who have had a prior urinary tract infection (UTI).  What are the signs or symptoms?  Symptoms of this condition include:  Peeing often.  A strong urge to pee right away.  Burning or stinging when peeing.  Belly pain.  Back pain.  Pain in the side (flank area).  Fever or chills.  Blood in the pee, or dark pee.  Feeling sick to your stomach (nauseous) or throwing up (vomiting).  How is this treated?  This condition may be treated by:  Taking antibiotic medicines by mouth (orally).  Drinking enough fluids.  If the infection is bad, you may need to stay in the hospital. You may be given antibiotics and fluids that are put directly into a vein through an IV tube.  In some cases, other treatments may be needed.  Follow these instructions at home:  Medicines  Take your antibiotic medicine as told by your doctor. Do not stop taking  the antibiotic even if you start to feel better.  Take over-the-counter and prescription medicines only as told by your doctor.  General instructions    Drink enough fluid to keep your pee pale yellow.  Avoid caffeine, tea, and carbonated drinks.  Pee (urinate) often. Avoid holding in pee for long periods of time.  Pee before and after sex.  After pooping (having a bowel movement), women should wipe from front to back. Use each tissue only once.  Keep all follow-up visits as told by your doctor. This is important.  Contact a doctor if:  You do not feel better after 2 days.  Your symptoms get worse.  You have a fever.  Get help right away if:  You cannot take your medicine or drink fluids as told.  You have chills and shaking.  You throw up.  You have very bad pain in your side or back.  You feel very weak or you pass out (faint).  Summary  Pyelonephritis is an infection that occurs in the kidney.  In most cases, this infection clears up with treatment and does not cause other problems.  Take your antibiotic medicine as told by your doctor. Do not stop taking the antibiotic even if you start to feel better.  Drink enough fluid to keep your pee pale yellow.  This information is not intended to replace advice given to you by your health care provider. Make sure you discuss any questions you have with your health care provider.  Document Revised: 07/28/2022 Document Reviewed: 07/28/2022  [employer] Patient Education © 2023 [employer]

## 2024-02-22 NOTE — PROGRESS NOTES
Rapid Response Summary     Rapid response called at 2053 for: Chest Pain     VS: Hypertensive  and Febrile (See Vitals Flowsheet)  Additional info: KERLINE DÍAZ Paged: S Logan  Interventions:    Imaging/Tests: Chest X-ray and EKG    Labs: CMP, Troponin, and Magnesium   Medications: NTG   Other: N/A  Disposition: Improved with rapid response team interventions. Primary RN updated on plan of care. Transfer not indicated at this time.  and Rapid team will continue to follow the patient.       2105 Pain improved from 8/10 to 5/10 after NTG administration    2110 Pain improved to 4/10    2115 Pain still 4/10

## 2024-02-23 NOTE — DISCHARGE SUMMARY
Discharge Summary    CHIEF COMPLAINT ON ADMISSION  Chief Complaint   Patient presents with    Fever    Nausea    Weakness    Dizziness     PT is having an increase in her starting this morning which she's states was 104.7. Pt is also having progressive weakness, nausea, and dizziness which she says started on Friday.       Reason for Admission  Pyelonephritis     Admission Date  2/18/2024    CODE STATUS  Full code    HPI & HOSPITAL COURSE  Please see original H&P and consult note for specific information  This 33-year-old female with a past medical history significant for nephrolithiasis, patient had a laser lithotripsy and ureteral stent replacement on 2/12 presented to the ER on 2/18/2024 with fever, nausea, dizziness.     Patient stated that she has generalized weakness, fever, chills, vomiting, nausea, right-sided flank pain thus had to come to ER.  She checked the temperature prior to arrival and found to be 104.       Open presents in ER, she is noted to be febrile with a temperature of 101, tachycardic with heart rate of 100, tachypneic with respirate of 24 WBC 7.3, platelet 109, potassium 3.4; UA consistent with UTI.  Blood cultures x 2 ordered, UA and urine culture ordered.  Patient was started on Cefepime . CT  Renal showing Stable right ureteral stent. No hydronephrosis. Tiny nonobstructing right renal stones    Patient will continue on IV antibiotics, urology was consulted recommended supportive treatment, patient's cultures negative, patient was nausea and vomiting improved and now she is tolerating diet, patient had chest pain earlier today but chest x-ray EKG and troponins were negative, patient is feeling much better she is alert oriented follows commands, discussed with pharmacist regarding antibiotics since patient has multiple allergies to medications, at this time per pharmacist we are going to continue on Augmentin to complete 7 days, patient has tolerated Augmentin in the past, patient again is  alert oriented follows commands she is tolerating diet, she is going to be discharged home today and she will follow-up with primary care physician and with urology as outpatient, patient expressed understanding of her plan of care and agree with it all question have been answered.    Therefore, she is discharged in good and stable condition to home with organized home healthcare and close outpatient follow-up.    The patient met 2-midnight criteria for an inpatient stay at the time of discharge.    Discharge Date  2/22/2024    FOLLOW UP ITEMS POST DISCHARGE  Primary care physician  Urology    DISCHARGE DIAGNOSES  Principal Problem (Resolved):    Pyelonephritis (POA: Yes)  Active Problems:    Borderline personality disorder (HCC) (POA: Unknown)    Severe obesity (BMI >= 40) (HCC) (POA: Yes)    Schizophrenia (HCC) (POA: Yes)    Prolonged Q-T interval on ECG (POA: Yes)  Resolved Problems:    Metabolic acidosis (POA: Yes)    Hypokalemia (POA: Yes)    Thrombocytopenia (HCC) (POA: Yes)      FOLLOW UP  Future Appointments   Date Time Provider Department Center   3/18/2024 12:40 PM John Leon M.D. [facility] None     Torres Brody M.D.  [address] [facility] [address]  [facility] [address]  [phone]    Go on 2/28/2024  Go to your appointment with Torres Brody M.D. on Wednesday February 28th 2024 at 9:00      MEDICATIONS ON DISCHARGE     Medication List        START taking these medications        Instructions   amoxicillin-clavulanate 875-125 MG Tabs  Commonly known as: Augmentin   Take 1 Tablet by mouth 2 times a day for 4 days.  Dose: 1 Tablet     oxyCODONE immediate-release 5 MG Tabs  Commonly known as: Roxicodone   Take 1 Tablet by mouth every 8 hours as needed for Severe Pain for up to 3 days.  Dose: 5 mg            CHANGE how you take these medications        Instructions   sodium chloride 1 GM Tabs  What changed: See the new instructions.  Commonly known as: Salt   Doctor's comments: DX Code Needed  .  TAKE 1  TABLET BY MOUTH THREE TIMES A DAY WITH MEALS ( NOT COVERED/INS )            CONTINUE taking these medications        Instructions   adalimumab 80 MG/0.8ML injection  Commonly known as: Humira   Inject 80 mg under the skin every 14 days.  Dose: 80 mg     Corlanor 7.5 MG Tabs tablet  Generic drug: ivabradine   Take 1 Tablet by mouth 2 times a day with meals.  Dose: 7.5 mg     diphenhydrAMINE 25 MG Tabs  Commonly known as: Benadryl   Take 25-50 mg by mouth 2 times a day. 25 mg in the morning, 50 mg in the evening  Dose: 25-50 mg     docusate sodium 250 MG Caps   Take 250 mg by mouth 2 times a day.  Dose: 250 mg     Emgality 120 MG/ML Soaj  Generic drug: Galcanezumab-gnlm   Inject 120 mg under the skin every 30 (thirty) days.  Dose: 120 mg     etonogestrel 68 MG Impl implant  Commonly known as: Nexplanon   Inject 1 Each under the skin see administration instructions. Pt reports she is not sure when she had this medications injected last, pt reports she still has it in her arm  Every 3 years to change  Dose: 68 mg     lamoTRIgine 25 MG Tabs  Commonly known as: LaMICtal   Take 50 mg by mouth 2 times a day. 50 mg = 2 tablets  Dose: 50 mg     Melatonin 10 MG Tabs   Take 10 mg by mouth at bedtime.  Dose: 10 mg     metoprolol SR 25 MG Tb24  Commonly known as: Toprol XL   Take 1 Tablet by mouth every day.  Dose: 25 mg     omeprazole 20 MG delayed-release capsule  Commonly known as: PriLOSEC   Take 20 mg by mouth every day.  Dose: 20 mg     tizanidine 4 MG Tabs  Commonly known as: Zanaflex   Take 4 mg by mouth 2 times a day.  Dose: 4 mg     topiramate 50 MG tablet  Commonly known as: Topamax   Take 50 mg by mouth 2 times a day.  Dose: 50 mg     ziprasidone 40 MG Caps  Commonly known as: Geodon   Take 1 capsule by mouth at bedtime.  Dose: 40 mg            STOP taking these medications      fluconazole 150 MG tablet  Commonly known as: Diflucan     nitrofurantoin 100 MG Caps  Commonly known as: Macrobid     ondansetron 4 MG  "Tbdp  Commonly known as: Zofran ODT              Allergies  Allergies   Allergen Reactions    Cefdinir Shortness of Breath and Itching     Tolerated 1/18/17  Tolerates ceftriaxone  Tolerated augmentin 8/2019     Depakote [Divalproex Sodium] Unspecified     Muscle spasms/muscle pain and weakness      Doxycycline Anaphylaxis and Vomiting     Other reaction(s): pustules/blisters  Other reaction(s): stomach pain    Montelukast [Singulair] Unspecified     Cardiac effusion    Vancomycin Itching     Pt becomes flushed in face and chest.   RXN=7/10/16    Wound Dressing Adhesive Rash     By pt report-\"removes skin totally off\"    Amitriptyline Unspecified     Headaches      Amoxicillin Rash          Aripiprazole Unspecified    Aripiprazole [Abilify] Unspecified     Headaches/muscle twitching      Clindamycin Nausea         Other reaction(s): nausea stomach pain    Depakote [Divalproex Sodium]     Erythromycin Rash     .  Other reaction(s): nausea stomach pain    Flomax [Tamsulosin Hydrochloride] Swelling    Hydromorphone      Other reaction(s): vomiting    Levaquin Unspecified     Severe muscle cramps in legs  RXN=unknown  Other reaction(s): leg muscle cramps    Metformin Unspecified     Increased lactic acid     Other reaction(s): itching and rash/nausea vomiting    Tamsulosin Swelling     Swelling of legs    Tape Rash     Tears skin off  coban with Tegaderm tape ok intermittently  RXN=ongoing    Ampicillin Rash     Pt reports that she received a rash     Ciprofloxacin Rash          Keflex Rash     Pt states she gets a rash with this medication  Tolerates ceftriaxone  Can take with Benadryl    Levofloxacin Unspecified     Leg muscle cramps    Metronidazole Rash     \"Vision problems\"  Other reaction(s): vision problems    Montelukast     Penicillins Hives     Can take with Benadryl    Sulfa Drugs Itching, Myalgia and Hives     Muscle pain and weakness  Other reaction(s): unknown    Valproic Acid Rash     .       DIET  No " orders of the defined types were placed in this encounter.      ACTIVITY  As tolerated.  Weight bearing as tolerated    CONSULTATIONS  Urology    PROCEDURES  None    LABORATORY  Lab Results   Component Value Date    SODIUM 139 02/21/2024    POTASSIUM 3.8 02/22/2024    CHLORIDE 109 02/21/2024    CO2 17 (L) 02/21/2024    GLUCOSE 161 (H) 02/21/2024    BUN 10 02/21/2024    CREATININE 0.67 02/21/2024    CREATININE 0.75 (L) 07/20/2010    GLOMRATE >59 07/20/2010        Lab Results   Component Value Date    WBC 3.9 (L) 02/21/2024    WBC 6.1 07/20/2010    HEMOGLOBIN 12.1 02/21/2024    HEMATOCRIT 33.5 (L) 02/21/2024    PLATELETCT 179 02/21/2024        Total time of the discharge process exceeds 32 minutes.

## 2024-03-04 DIAGNOSIS — G90.A POSTURAL ORTHOSTATIC TACHYCARDIA SYNDROME: ICD-10-CM

## 2024-03-05 RX ORDER — IVABRADINE 7.5 MG/1
7.5 TABLET, FILM COATED ORAL 2 TIMES DAILY WITH MEALS
Qty: 60 TABLET | Refills: 0 | Status: SHIPPED | OUTPATIENT
Start: 2024-03-05 | End: 2024-03-18 | Stop reason: SDUPTHER

## 2024-03-07 ENCOUNTER — HOSPITAL ENCOUNTER (EMERGENCY)
Facility: MEDICAL CENTER | Age: 34
End: 2024-03-07
Attending: EMERGENCY MEDICINE
Payer: MEDICARE

## 2024-03-07 ENCOUNTER — APPOINTMENT (OUTPATIENT)
Dept: RADIOLOGY | Facility: MEDICAL CENTER | Age: 34
End: 2024-03-07
Attending: EMERGENCY MEDICINE
Payer: MEDICARE

## 2024-03-07 VITALS
TEMPERATURE: 98.3 F | WEIGHT: 260.36 LBS | BODY MASS INDEX: 44.45 KG/M2 | HEART RATE: 68 BPM | HEIGHT: 64 IN | RESPIRATION RATE: 16 BRPM | DIASTOLIC BLOOD PRESSURE: 74 MMHG | SYSTOLIC BLOOD PRESSURE: 140 MMHG | OXYGEN SATURATION: 96 %

## 2024-03-07 DIAGNOSIS — R07.89 ATYPICAL CHEST PAIN: ICD-10-CM

## 2024-03-07 LAB
ALBUMIN SERPL BCP-MCNC: 4.9 G/DL (ref 3.2–4.9)
ALBUMIN/GLOB SERPL: 2.2 G/DL
ALP SERPL-CCNC: 87 U/L (ref 30–99)
ALT SERPL-CCNC: 27 U/L (ref 2–50)
ANION GAP SERPL CALC-SCNC: 13 MMOL/L (ref 7–16)
APPEARANCE UR: ABNORMAL
AST SERPL-CCNC: 16 U/L (ref 12–45)
BACTERIA #/AREA URNS HPF: ABNORMAL /HPF
BASOPHILS # BLD AUTO: 1.2 % (ref 0–1.8)
BASOPHILS # BLD: 0.06 K/UL (ref 0–0.12)
BILIRUB SERPL-MCNC: 0.4 MG/DL (ref 0.1–1.5)
BILIRUB UR QL STRIP.AUTO: NEGATIVE
BUN SERPL-MCNC: 14 MG/DL (ref 8–22)
CALCIUM ALBUM COR SERPL-MCNC: 8.8 MG/DL (ref 8.5–10.5)
CALCIUM SERPL-MCNC: 9.5 MG/DL (ref 8.4–10.2)
CHLORIDE SERPL-SCNC: 109 MMOL/L (ref 96–112)
CO2 SERPL-SCNC: 19 MMOL/L (ref 20–33)
COLOR UR: YELLOW
CREAT SERPL-MCNC: 0.8 MG/DL (ref 0.5–1.4)
EKG IMPRESSION: NORMAL
EOSINOPHIL # BLD AUTO: 0.08 K/UL (ref 0–0.51)
EOSINOPHIL NFR BLD: 1.6 % (ref 0–6.9)
EPI CELLS #/AREA URNS HPF: ABNORMAL /HPF
ERYTHROCYTE [DISTWIDTH] IN BLOOD BY AUTOMATED COUNT: 44.4 FL (ref 35.9–50)
GFR SERPLBLD CREATININE-BSD FMLA CKD-EPI: 99 ML/MIN/1.73 M 2
GLOBULIN SER CALC-MCNC: 2.2 G/DL (ref 1.9–3.5)
GLUCOSE SERPL-MCNC: 95 MG/DL (ref 65–99)
GLUCOSE UR STRIP.AUTO-MCNC: NEGATIVE MG/DL
HCG SERPL QL: NEGATIVE
HCT VFR BLD AUTO: 43.6 % (ref 37–47)
HGB BLD-MCNC: 14.8 G/DL (ref 12–16)
IMM GRANULOCYTES # BLD AUTO: 0.01 K/UL (ref 0–0.11)
IMM GRANULOCYTES NFR BLD AUTO: 0.2 % (ref 0–0.9)
KETONES UR STRIP.AUTO-MCNC: NEGATIVE MG/DL
LEUKOCYTE ESTERASE UR QL STRIP.AUTO: NEGATIVE
LIPASE SERPL-CCNC: 46 U/L (ref 11–82)
LYMPHOCYTES # BLD AUTO: 1.75 K/UL (ref 1–4.8)
LYMPHOCYTES NFR BLD: 34.2 % (ref 22–41)
MCH RBC QN AUTO: 31.4 PG (ref 27–33)
MCHC RBC AUTO-ENTMCNC: 33.9 G/DL (ref 32.2–35.5)
MCV RBC AUTO: 92.6 FL (ref 81.4–97.8)
MICRO URNS: ABNORMAL
MONOCYTES # BLD AUTO: 0.39 K/UL (ref 0–0.85)
MONOCYTES NFR BLD AUTO: 7.6 % (ref 0–13.4)
MUCOUS THREADS #/AREA URNS HPF: ABNORMAL /HPF
NEUTROPHILS # BLD AUTO: 2.82 K/UL (ref 1.82–7.42)
NEUTROPHILS NFR BLD: 55.2 % (ref 44–72)
NITRITE UR QL STRIP.AUTO: NEGATIVE
NRBC # BLD AUTO: 0 K/UL
NRBC BLD-RTO: 0 /100 WBC (ref 0–0.2)
PH UR STRIP.AUTO: 6 [PH] (ref 5–8)
PLATELET # BLD AUTO: 207 K/UL (ref 164–446)
PMV BLD AUTO: 11.9 FL (ref 9–12.9)
POTASSIUM SERPL-SCNC: 3.8 MMOL/L (ref 3.6–5.5)
PROT SERPL-MCNC: 7.1 G/DL (ref 6–8.2)
PROT UR QL STRIP: NEGATIVE MG/DL
RBC # BLD AUTO: 4.71 M/UL (ref 4.2–5.4)
RBC # URNS HPF: ABNORMAL /HPF
RBC UR QL AUTO: NEGATIVE
SODIUM SERPL-SCNC: 141 MMOL/L (ref 135–145)
SP GR UR STRIP.AUTO: 1.02
TROPONIN T SERPL-MCNC: <6 NG/L (ref 6–19)
TROPONIN T SERPL-MCNC: <6 NG/L (ref 6–19)
WBC # BLD AUTO: 5.1 K/UL (ref 4.8–10.8)
WBC #/AREA URNS HPF: ABNORMAL /HPF

## 2024-03-07 PROCEDURE — 81001 URINALYSIS AUTO W/SCOPE: CPT

## 2024-03-07 PROCEDURE — 83690 ASSAY OF LIPASE: CPT

## 2024-03-07 PROCEDURE — 99284 EMERGENCY DEPT VISIT MOD MDM: CPT

## 2024-03-07 PROCEDURE — 84484 ASSAY OF TROPONIN QUANT: CPT

## 2024-03-07 PROCEDURE — 71045 X-RAY EXAM CHEST 1 VIEW: CPT

## 2024-03-07 PROCEDURE — 36415 COLL VENOUS BLD VENIPUNCTURE: CPT

## 2024-03-07 PROCEDURE — 93005 ELECTROCARDIOGRAM TRACING: CPT

## 2024-03-07 PROCEDURE — 84703 CHORIONIC GONADOTROPIN ASSAY: CPT

## 2024-03-07 PROCEDURE — 700111 HCHG RX REV CODE 636 W/ 250 OVERRIDE (IP): Performed by: EMERGENCY MEDICINE

## 2024-03-07 PROCEDURE — 80053 COMPREHEN METABOLIC PANEL: CPT

## 2024-03-07 PROCEDURE — 96375 TX/PRO/DX INJ NEW DRUG ADDON: CPT

## 2024-03-07 PROCEDURE — 93005 ELECTROCARDIOGRAM TRACING: CPT | Performed by: EMERGENCY MEDICINE

## 2024-03-07 PROCEDURE — 85025 COMPLETE CBC W/AUTO DIFF WBC: CPT

## 2024-03-07 PROCEDURE — A9270 NON-COVERED ITEM OR SERVICE: HCPCS | Performed by: EMERGENCY MEDICINE

## 2024-03-07 PROCEDURE — 96374 THER/PROPH/DIAG INJ IV PUSH: CPT

## 2024-03-07 PROCEDURE — 700102 HCHG RX REV CODE 250 W/ 637 OVERRIDE(OP): Performed by: EMERGENCY MEDICINE

## 2024-03-07 RX ORDER — KETOROLAC TROMETHAMINE 15 MG/ML
15 INJECTION, SOLUTION INTRAMUSCULAR; INTRAVENOUS ONCE
Status: COMPLETED | OUTPATIENT
Start: 2024-03-07 | End: 2024-03-07

## 2024-03-07 RX ORDER — ONDANSETRON 2 MG/ML
4 INJECTION INTRAMUSCULAR; INTRAVENOUS ONCE
Status: COMPLETED | OUTPATIENT
Start: 2024-03-07 | End: 2024-03-07

## 2024-03-07 RX ORDER — HYDROCODONE BITARTRATE AND ACETAMINOPHEN 5; 325 MG/1; MG/1
2 TABLET ORAL ONCE
Status: COMPLETED | OUTPATIENT
Start: 2024-03-07 | End: 2024-03-07

## 2024-03-07 RX ADMIN — ONDANSETRON 4 MG: 2 INJECTION INTRAMUSCULAR; INTRAVENOUS at 14:27

## 2024-03-07 RX ADMIN — HYDROCODONE BITARTRATE AND ACETAMINOPHEN 2 TABLET: 5; 325 TABLET ORAL at 15:59

## 2024-03-07 RX ADMIN — KETOROLAC TROMETHAMINE 15 MG: 15 INJECTION, SOLUTION INTRAMUSCULAR; INTRAVENOUS at 14:27

## 2024-03-07 ASSESSMENT — FIBROSIS 4 INDEX: FIB4 SCORE: 0.77

## 2024-03-07 ASSESSMENT — HEART SCORE
ECG: NORMAL
RISK FACTORS: 1-2 RISK FACTORS
HISTORY: SLIGHTLY SUSPICIOUS
TROPONIN: LESS THAN OR EQUAL TO NORMAL LIMIT
HEART SCORE: 1
AGE: <45

## 2024-03-08 NOTE — ED PROVIDER NOTES
ED Provider Note    CHIEF COMPLAINT  Chief Complaint   Patient presents with    Chest Pain     Lt anterior CP x 3 d   Constant dull ache Radiates to back and down LUE  Associated with SOB and nausea No diaph or vomiting  Denies cough, sore throat or fever       EXTERNAL RECORDS REVIEWED  Reviewed extensive hospitalization records recent admission for complicated kidney stone and UTI    HPI/ROS  LIMITATION TO HISTORY   None  OUTSIDE HISTORIAN(S):  None    Kristin Balderrama is a 33 y.o. female who presents for evaluation of a constellation of symptoms including left anterior chest pain dull aching.  She reported some nausea but no epigastric pain.  She specifically denies pleuritic chest pain leg swelling or hemoptysis.  The patient has a complex and extensive medical history as listed below.  She recently was hospitalized for a obstructing kidney stone with infection.  She underwent nephrostomy tube placement as well as stent which was subsequently removed.  She denies high fevers or chills.  She does have a longstanding history of chronic pain.  She denies history of thromboembolic disease.  She does have a history of pots syndrome as well as arrhythmia that underwent ablation.  She is followed by Dr. Ch with cardiology.  She specifically denies strokelike symptoms such as focal numbness weakness tingling to the arms legs or face.  No pain radiating to the shoulder blades.    PAST MEDICAL HISTORY   has a past medical history of Abdominal pain, Anginal syndrome, Apnea, sleep, Arrhythmia, Arthritis, ASTHMA, Back pain, Borderline personality disorder (HCC), Breath shortness, Bronchitis (02/08/2022), Cardiac arrhythmia, Chickenpox, Chronic UTI (09/18/2010), Cough, Daytime sleepiness, Dental disorder (03/08/2021), Depression, Diabetes (HCC), Diarrhea, Disorder of thyroid, Fall, Fatigue, Frequent headaches, Gasping for breath, Gynecological disorder, Headache(784.0), Heart burn, Heart murmur, History of  falling, Indigestion, Migraine, Mitochondrial disease (HCC), Multiple personality disorder (HCC), Nausea, Obesity, Other fatigue (06/29/2020), Pain (08/15/2012), Painful joint, PCOS (polycystic ovarian syndrome), Pneumonia (2012 12/2020), POTS (postural orthostatic tachycardia syndrome), Psychosis ([facility]), Ringing in ears, Scoliosis, Shortness of breath, Sinus tachycardia (10/31/2013), Sleep apnea, Snoring, Supraventricular tachycardia (4/10/2019), Tonsillitis, Transverse myelitis ([facility]), Tuberculosis, Urinary bladder disorder, Urinary incontinence, Weakness, and Wears glasses.    SURGICAL HISTORY   has a past surgical history that includes neuro dest facet l/s w/ig sngl (04/21/2015); recovery (01/27/2016); [place] by laparoscopy (08/29/2010); lumbar fusion anterior (08/21/2012); other cardiac surgery (01/2016); tonsillectomy; bowel stimulator insertion (07/13/2016); gastroscopy with balloon dilatation (N/A, 01/04/2017); muscle biopsy (Right, 01/26/2017); other abdominal surgery; laminotomy; bowel stimulator insertion (03/10/2021); lap,diagnostic abdomen (02/14/2022); ovarian cystectomy (Right, 02/14/2022); percut fix prox/neck femur fx (Left, 01/28/2023); inguinal hernia laparoscopic (Right, 07/21/2023); hernia repair (Right, 07/21/2023); cystoscopy,insert ureteral stent (Right, 2/12/2024); cysto/uretero/pyeloscopy, dx (Right, 2/12/2024); and lasertripsy (Right, 2/12/2024).    FAMILY HISTORY  Family History   Problem Relation Age of Onset    Hypertension Mother     Sleep Apnea Mother     Heart Disease Mother     Other Mother         hypothryod    Hypertension Maternal Uncle     Heart Disease Maternal Grandmother     Hypertension Maternal Grandmother     No Known Problems Sister     Other Sister         Narcolepsy;fibromyalsia;bone;nerve    Genitourinary () Problems Sister         endometriosis       SOCIAL HISTORY  Social History     Tobacco Use    Smoking status: Never    Smokeless tobacco: Never   Vaping Use     Vaping Use: Never used   Substance and Sexual Activity    Alcohol use: No     Alcohol/week: 0.0 oz    Drug use: Never     Frequency: 7.0 times per week    Sexual activity: Not Currently     Birth control/protection: Implant       CURRENT MEDICATIONS  Home Medications    **Home medications have not yet been reviewed for this encounter**     No current facility-administered medications for this encounter.    Current Outpatient Medications:     CORLANOR 7.5 MG Tab tablet, TAKE 1 TABLET BY MOUTH TWICE A DAY WITH FOOD, Disp: 60 Tablet, Rfl: 0    docusate sodium 250 MG Cap, Take 250 mg by mouth 2 times a day., Disp: , Rfl:     sodium chloride (SALT) 1 GM Tab, TAKE 1 TABLET BY MOUTH THREE TIMES A DAY WITH MEALS ( NOT COVERED/INS ) (Patient taking differently: Take 1 g by mouth 3 times a day.), Disp: 90 Tablet, Rfl: 2    omeprazole (PRILOSEC) 20 MG delayed-release capsule, Take 20 mg by mouth every day., Disp: , Rfl:     lamoTRIgine (LAMICTAL) 25 MG Tab, Take 50 mg by mouth 2 times a day. 50 mg = 2 tablets, Disp: , Rfl:     Galcanezumab-gnlm (EMGALITY) 120 MG/ML Solution Auto-injector, Inject 120 mg under the skin every 30 (thirty) days., Disp: , Rfl:     topiramate (TOPAMAX) 50 MG tablet, Take 50 mg by mouth 2 times a day., Disp: , Rfl:     metoprolol SR (TOPROL XL) 25 MG TABLET SR 24 HR, Take 1 Tablet by mouth every day., Disp: 90 Tablet, Rfl: 3    Adalimumab 80 MG/0.8ML Pen-injector Kit, Inject 80 mg under the skin every 14 days., Disp: , Rfl:     tizanidine (ZANAFLEX) 4 MG Tab, Take 4 mg by mouth 2 times a day., Disp: , Rfl:     ziprasidone (GEODON) 40 MG Cap, Take 1 capsule by mouth at bedtime., Disp: 30 Capsule, Rfl: 2    diphenhydrAMINE (BENADRYL) 25 MG Tab, Take 25-50 mg by mouth 2 times a day. 25 mg in the morning, 50 mg in the evening, Disp: , Rfl:     Melatonin 10 MG Tab, Take 10 mg by mouth at bedtime., Disp: , Rfl:     etonogestrel (NEXPLANON) 68 MG Implant implant, Inject 1 Each under the skin see  "administration instructions. Pt reports she is not sure when she had this medications injected last, pt reports she still has it in her arm Every 3 years to change, Disp: , Rfl:       ALLERGIES  Allergies   Allergen Reactions    Cefdinir Shortness of Breath and Itching     Tolerated 1/18/17  Tolerates ceftriaxone  Tolerated augmentin 8/2019     Depakote [Divalproex Sodium] Unspecified     Muscle spasms/muscle pain and weakness      Doxycycline Anaphylaxis and Vomiting     Other reaction(s): pustules/blisters  Other reaction(s): stomach pain    Montelukast [Singulair] Unspecified     Cardiac effusion    Vancomycin Itching     Pt becomes flushed in face and chest.   RXN=7/10/16    Wound Dressing Adhesive Rash     By pt report-\"removes skin totally off\"    Amitriptyline Unspecified     Headaches      Amoxicillin Rash          Aripiprazole Unspecified    Aripiprazole [Abilify] Unspecified     Headaches/muscle twitching      Clindamycin Nausea         Other reaction(s): nausea stomach pain    Depakote [Divalproex Sodium]     Erythromycin Rash     .  Other reaction(s): nausea stomach pain    Flomax [Tamsulosin Hydrochloride] Swelling    Hydromorphone      Other reaction(s): vomiting    Levaquin Unspecified     Severe muscle cramps in legs  RXN=unknown  Other reaction(s): leg muscle cramps    Metformin Unspecified     Increased lactic acid     Other reaction(s): itching and rash/nausea vomiting    Tamsulosin Swelling     Swelling of legs    Tape Rash     Tears skin off  coban with Tegaderm tape ok intermittently  RXN=ongoing    Ampicillin Rash     Pt reports that she received a rash     Ciprofloxacin Rash          Keflex Rash     Pt states she gets a rash with this medication  Tolerates ceftriaxone  Can take with Benadryl    Levofloxacin Unspecified     Leg muscle cramps    Metronidazole Rash     \"Vision problems\"  Other reaction(s): vision problems    Montelukast     Penicillins Hives     Can take with Benadryl    Sulfa " "Drugs Itching, Myalgia and Hives     Muscle pain and weakness  Other reaction(s): unknown    Valproic Acid Rash     .       PHYSICAL EXAM  VITAL SIGNS: BP (!) 169/97   Pulse 76   Temp 36.8 °C (98.3 °F) (Temporal)   Resp 13   Ht 1.626 m (5' 4\")   Wt 118 kg (260 lb 5.8 oz)   SpO2 99%   BMI 44.69 kg/m²    Pulse ox interpretation: I interpret this pulse ox as normal.  Constitutional: Alert and oriented x 3, mild distress  HEENT: Atraumatic normocephalic, pupils are equal round reactive to light extraocular movements are intact. The nares is clear, external ears are normal, mouth shows moist mucous membranes normal dentition for age  Neck: Supple, no JVD no tracheal deviation  Cardiovascular: Regular rate and rhythm no murmur rub or gallop 2+ pulses peripherally x4  Thorax & Lungs: No respiratory distress, no wheezes rales or rhonchi, No chest tenderness.   GI: Soft nontender nondistended positive bowel sounds, no peritoneal signs no rebound guarding or rigidity  Skin: Warm dry no acute rash or lesion  Musculoskeletal: Moving all extremities with full range and 5 of 5 strength no acute  deformity negative Homans' sign bilaterally  Neurologic: Cranial nerves III through XII are grossly intact no sensory deficit no cerebellar dysfunction   Psychiatric: Anxious          DIAGNOSTIC STUDIES / PROCEDURES    LABS  Results for orders placed or performed during the hospital encounter of 03/07/24   CBC with Differential   Result Value Ref Range    WBC 5.1 4.8 - 10.8 K/uL    RBC 4.71 4.20 - 5.40 M/uL    Hemoglobin 14.8 12.0 - 16.0 g/dL    Hematocrit 43.6 37.0 - 47.0 %    MCV 92.6 81.4 - 97.8 fL    MCH 31.4 27.0 - 33.0 pg    MCHC 33.9 32.2 - 35.5 g/dL    RDW 44.4 35.9 - 50.0 fL    Platelet Count 207 164 - 446 K/uL    MPV 11.9 9.0 - 12.9 fL    Neutrophils-Polys 55.20 44.00 - 72.00 %    Lymphocytes 34.20 22.00 - 41.00 %    Monocytes 7.60 0.00 - 13.40 %    Eosinophils 1.60 0.00 - 6.90 %    Basophils 1.20 0.00 - 1.80 %    " Immature Granulocytes 0.20 0.00 - 0.90 %    Nucleated RBC 0.00 0.00 - 0.20 /100 WBC    Neutrophils (Absolute) 2.82 1.82 - 7.42 K/uL    Lymphs (Absolute) 1.75 1.00 - 4.80 K/uL    Monos (Absolute) 0.39 0.00 - 0.85 K/uL    Eos (Absolute) 0.08 0.00 - 0.51 K/uL    Baso (Absolute) 0.06 0.00 - 0.12 K/uL    Immature Granulocytes (abs) 0.01 0.00 - 0.11 K/uL    NRBC (Absolute) 0.00 K/uL   Complete Metabolic Panel (CMP)   Result Value Ref Range    Sodium 141 135 - 145 mmol/L    Potassium 3.8 3.6 - 5.5 mmol/L    Chloride 109 96 - 112 mmol/L    Co2 19 (L) 20 - 33 mmol/L    Anion Gap 13.0 7.0 - 16.0    Glucose 95 65 - 99 mg/dL    Bun 14 8 - 22 mg/dL    Creatinine 0.80 0.50 - 1.40 mg/dL    Calcium 9.5 8.4 - 10.2 mg/dL    Correct Calcium 8.8 8.5 - 10.5 mg/dL    AST(SGOT) 16 12 - 45 U/L    ALT(SGPT) 27 2 - 50 U/L    Alkaline Phosphatase 87 30 - 99 U/L    Total Bilirubin 0.4 0.1 - 1.5 mg/dL    Albumin 4.9 3.2 - 4.9 g/dL    Total Protein 7.1 6.0 - 8.2 g/dL    Globulin 2.2 1.9 - 3.5 g/dL    A-G Ratio 2.2 g/dL   Troponin STAT   Result Value Ref Range    Troponin T <6 6 - 19 ng/L   LIPASE   Result Value Ref Range    Lipase 46 11 - 82 U/L   BETA-HCG QUALITATIVE SERUM   Result Value Ref Range    Beta-Hcg Qualitative Serum Negative Negative   URINALYSIS    Specimen: Urine   Result Value Ref Range    Color Yellow     Character Hazy (A)     Specific Gravity 1.025 <1.035    Ph 6.0 5.0 - 8.0    Glucose Negative Negative mg/dL    Ketones Negative Negative mg/dL    Protein Negative Negative mg/dL    Bilirubin Negative Negative    Nitrite Negative Negative    Leukocyte Esterase Negative Negative    Occult Blood Negative Negative    Micro Urine Req Microscopic    ESTIMATED GFR   Result Value Ref Range    GFR (CKD-EPI) 99 >60 mL/min/1.73 m 2   URINE MICROSCOPIC (W/UA)   Result Value Ref Range    WBC 5-10 (A) /hpf    RBC 0-2 /hpf    Bacteria Few (A) None /hpf    Epithelial Cells Few Few /hpf    Mucous Threads Moderate /hpf   EKG   Result Value Ref  Range    Report       [facility]    Test Date:  2024  Pt Name:    HARLEY DE LA CRUZ              Department: [facility]  MRN:        [id number]                      Room:  Gender:     Female                       Technician: [id number]  :        [id number]                   Requested By:ER TRIAGE PROTOCOL  Order #:    [id number]                    Reading MD:    Measurements  Intervals                                Axis  Rate:       75                           P:          30  ID:         [id number]                          QRS:        8  QRSD:       99                           T:          15  QT:         399  QTc:        446    Interpretive Statements  Sinus rhythm  Borderline T abnormalities, anterior leads  Baseline wander in lead(s) V1  Compared to ECG 2024 20:45:12  Prolonged QT interval no longer present  T-wave abnormality still present       *Note: Due to a large number of results and/or encounters for the requested time period, some results have not been displayed. A complete set of results can be found in Results Review.        RADIOLOGY  I have independently interpreted the diagnostic imaging associated with this visit and am waiting the final reading from the radiologist.   My preliminary interpretation is as follows: No evidence of pneumonia heart failure pneumothorax  Radiologist interpretation:   DX-CHEST-PORTABLE (1 VIEW)   Final Result      No evidence of acute cardiopulmonary process.          COURSE & MEDICAL DECISION MAKING    ED Observation Status? No; Patient does not meet criteria for ED Observation.     INITIAL ASSESSMENT, COURSE AND PLAN  Care Narrative:     This is a very pleasant 33-year-old female presents here with chest discomfort.  Here her vital signs are reassuring and normal.  Specifically no high fever hypotension tachycardia or hypoxia.  I performed extensive chart review.  She does have some underlying cardiac issues including POTS syndrome as  well as previous arrhythmia status post ablation.  Here she is in sinus rhythm and EKG does not suggest any ischemia or arrhythmia.  Her PE RC score is negative therefore D-dimer testing was not initiated.  On exam she did not have any evidence of increased work of breathing or hypoxia.  Chest x-ray is clear.  Urinalysis appears to be clear from any significant infection or blood.  She was given some Toradol as well as Norco and Zofran.  Her serial 2-hour delta troponins were nondetectable therefore I think that acute coronary syndrome is exceedingly unlikely and her heart score is low therefore admission for extensive workup is not clinically indicated      ADDITIONAL PROBLEM LIST    DISPOSITION AND DISCUSSIONS  I have discussed management of the patient with the following physicians and ARIES's: None    Discussion of management with other QHP or appropriate source(s): None    Escalation of care considered, and ultimately not performed: Considered admission    Barriers to care at this time, including but not limited to: None.     Decision tools and prescription drugs considered including, but not limited to: Heart score is 1.    FINAL DIAGNOSIS  1. Atypical chest pain                 Electronically signed by: Brian Castrejon M.D., 3/7/2024 4:12 PM

## 2024-03-14 ENCOUNTER — APPOINTMENT (OUTPATIENT)
Dept: RADIOLOGY | Facility: MEDICAL CENTER | Age: 34
End: 2024-03-14
Attending: EMERGENCY MEDICINE
Payer: MEDICARE

## 2024-03-14 ENCOUNTER — HOSPITAL ENCOUNTER (OUTPATIENT)
Facility: MEDICAL CENTER | Age: 34
End: 2024-03-15
Attending: EMERGENCY MEDICINE | Admitting: STUDENT IN AN ORGANIZED HEALTH CARE EDUCATION/TRAINING PROGRAM
Payer: MEDICARE

## 2024-03-14 DIAGNOSIS — R11.2 NAUSEA AND VOMITING, UNSPECIFIED VOMITING TYPE: ICD-10-CM

## 2024-03-14 DIAGNOSIS — R11.2 INTRACTABLE NAUSEA AND VOMITING: ICD-10-CM

## 2024-03-14 DIAGNOSIS — R10.9 ACUTE ABDOMINAL PAIN: ICD-10-CM

## 2024-03-14 LAB
ALBUMIN SERPL BCP-MCNC: 4.8 G/DL (ref 3.2–4.9)
ALBUMIN/GLOB SERPL: 2 G/DL
ALP SERPL-CCNC: 89 U/L (ref 30–99)
ALT SERPL-CCNC: 28 U/L (ref 2–50)
ANION GAP SERPL CALC-SCNC: 16 MMOL/L (ref 7–16)
APPEARANCE UR: CLEAR
AST SERPL-CCNC: 15 U/L (ref 12–45)
BASOPHILS # BLD AUTO: 0.7 % (ref 0–1.8)
BASOPHILS # BLD: 0.05 K/UL (ref 0–0.12)
BILIRUB SERPL-MCNC: 0.3 MG/DL (ref 0.1–1.5)
BILIRUB UR QL STRIP.AUTO: NEGATIVE
BUN SERPL-MCNC: 21 MG/DL (ref 8–22)
CALCIUM ALBUM COR SERPL-MCNC: 8.9 MG/DL (ref 8.5–10.5)
CALCIUM SERPL-MCNC: 9.5 MG/DL (ref 8.5–10.5)
CHLORIDE SERPL-SCNC: 110 MMOL/L (ref 96–112)
CO2 SERPL-SCNC: 14 MMOL/L (ref 20–33)
COLOR UR: YELLOW
CREAT SERPL-MCNC: 0.79 MG/DL (ref 0.5–1.4)
EKG IMPRESSION: NORMAL
EOSINOPHIL # BLD AUTO: 0.13 K/UL (ref 0–0.51)
EOSINOPHIL NFR BLD: 1.9 % (ref 0–6.9)
ERYTHROCYTE [DISTWIDTH] IN BLOOD BY AUTOMATED COUNT: 43.3 FL (ref 35.9–50)
GFR SERPLBLD CREATININE-BSD FMLA CKD-EPI: 100 ML/MIN/1.73 M 2
GLOBULIN SER CALC-MCNC: 2.4 G/DL (ref 1.9–3.5)
GLUCOSE SERPL-MCNC: 147 MG/DL (ref 65–99)
GLUCOSE UR STRIP.AUTO-MCNC: NEGATIVE MG/DL
HCT VFR BLD AUTO: 44.4 % (ref 37–47)
HGB BLD-MCNC: 15.5 G/DL (ref 12–16)
IMM GRANULOCYTES # BLD AUTO: 0.03 K/UL (ref 0–0.11)
IMM GRANULOCYTES NFR BLD AUTO: 0.4 % (ref 0–0.9)
KETONES UR STRIP.AUTO-MCNC: NEGATIVE MG/DL
LEUKOCYTE ESTERASE UR QL STRIP.AUTO: NEGATIVE
LIPASE SERPL-CCNC: 50 U/L (ref 11–82)
LYMPHOCYTES # BLD AUTO: 2.19 K/UL (ref 1–4.8)
LYMPHOCYTES NFR BLD: 31.2 % (ref 22–41)
MCH RBC QN AUTO: 31.8 PG (ref 27–33)
MCHC RBC AUTO-ENTMCNC: 34.9 G/DL (ref 32.2–35.5)
MCV RBC AUTO: 91 FL (ref 81.4–97.8)
MICRO URNS: NORMAL
MONOCYTES # BLD AUTO: 0.57 K/UL (ref 0–0.85)
MONOCYTES NFR BLD AUTO: 8.1 % (ref 0–13.4)
NEUTROPHILS # BLD AUTO: 4.05 K/UL (ref 1.82–7.42)
NEUTROPHILS NFR BLD: 57.7 % (ref 44–72)
NITRITE UR QL STRIP.AUTO: NEGATIVE
NRBC # BLD AUTO: 0 K/UL
NRBC BLD-RTO: 0 /100 WBC (ref 0–0.2)
PH UR STRIP.AUTO: 5 [PH] (ref 5–8)
PLATELET # BLD AUTO: 197 K/UL (ref 164–446)
PMV BLD AUTO: 11.3 FL (ref 9–12.9)
POTASSIUM SERPL-SCNC: 3.7 MMOL/L (ref 3.6–5.5)
PROT SERPL-MCNC: 7.2 G/DL (ref 6–8.2)
PROT UR QL STRIP: NEGATIVE MG/DL
RBC # BLD AUTO: 4.88 M/UL (ref 4.2–5.4)
RBC UR QL AUTO: NEGATIVE
SODIUM SERPL-SCNC: 140 MMOL/L (ref 135–145)
SP GR UR STRIP.AUTO: 1.02
UROBILINOGEN UR STRIP.AUTO-MCNC: 0.2 MG/DL
WBC # BLD AUTO: 7 K/UL (ref 4.8–10.8)

## 2024-03-14 PROCEDURE — A9270 NON-COVERED ITEM OR SERVICE: HCPCS | Mod: UD | Performed by: STUDENT IN AN ORGANIZED HEALTH CARE EDUCATION/TRAINING PROGRAM

## 2024-03-14 PROCEDURE — 96374 THER/PROPH/DIAG INJ IV PUSH: CPT

## 2024-03-14 PROCEDURE — 80053 COMPREHEN METABOLIC PANEL: CPT

## 2024-03-14 PROCEDURE — G0378 HOSPITAL OBSERVATION PER HR: HCPCS

## 2024-03-14 PROCEDURE — 36415 COLL VENOUS BLD VENIPUNCTURE: CPT

## 2024-03-14 PROCEDURE — 99285 EMERGENCY DEPT VISIT HI MDM: CPT

## 2024-03-14 PROCEDURE — C9113 INJ PANTOPRAZOLE SODIUM, VIA: HCPCS | Mod: JZ | Performed by: STUDENT IN AN ORGANIZED HEALTH CARE EDUCATION/TRAINING PROGRAM

## 2024-03-14 PROCEDURE — 700105 HCHG RX REV CODE 258: Mod: UD | Performed by: EMERGENCY MEDICINE

## 2024-03-14 PROCEDURE — 700117 HCHG RX CONTRAST REV CODE 255: Mod: UD | Performed by: EMERGENCY MEDICINE

## 2024-03-14 PROCEDURE — 96375 TX/PRO/DX INJ NEW DRUG ADDON: CPT

## 2024-03-14 PROCEDURE — 85025 COMPLETE CBC W/AUTO DIFF WBC: CPT

## 2024-03-14 PROCEDURE — 99223 1ST HOSP IP/OBS HIGH 75: CPT | Performed by: STUDENT IN AN ORGANIZED HEALTH CARE EDUCATION/TRAINING PROGRAM

## 2024-03-14 PROCEDURE — 81003 URINALYSIS AUTO W/O SCOPE: CPT

## 2024-03-14 PROCEDURE — 93005 ELECTROCARDIOGRAM TRACING: CPT | Performed by: EMERGENCY MEDICINE

## 2024-03-14 PROCEDURE — 83690 ASSAY OF LIPASE: CPT

## 2024-03-14 PROCEDURE — 74177 CT ABD & PELVIS W/CONTRAST: CPT

## 2024-03-14 PROCEDURE — 80307 DRUG TEST PRSMV CHEM ANLYZR: CPT

## 2024-03-14 PROCEDURE — 700105 HCHG RX REV CODE 258: Mod: UD | Performed by: STUDENT IN AN ORGANIZED HEALTH CARE EDUCATION/TRAINING PROGRAM

## 2024-03-14 PROCEDURE — 700102 HCHG RX REV CODE 250 W/ 637 OVERRIDE(OP): Mod: UD | Performed by: STUDENT IN AN ORGANIZED HEALTH CARE EDUCATION/TRAINING PROGRAM

## 2024-03-14 PROCEDURE — 700111 HCHG RX REV CODE 636 W/ 250 OVERRIDE (IP): Mod: JZ,JG,UD | Performed by: STUDENT IN AN ORGANIZED HEALTH CARE EDUCATION/TRAINING PROGRAM

## 2024-03-14 PROCEDURE — 700111 HCHG RX REV CODE 636 W/ 250 OVERRIDE (IP): Mod: UD | Performed by: EMERGENCY MEDICINE

## 2024-03-14 RX ORDER — LAMOTRIGINE 100 MG/1
50 TABLET ORAL 2 TIMES DAILY
Status: DISCONTINUED | OUTPATIENT
Start: 2024-03-15 | End: 2024-03-15 | Stop reason: HOSPADM

## 2024-03-14 RX ORDER — ACETAMINOPHEN 325 MG/1
650 TABLET ORAL EVERY 6 HOURS
Status: DISCONTINUED | OUTPATIENT
Start: 2024-03-15 | End: 2024-03-15 | Stop reason: HOSPADM

## 2024-03-14 RX ORDER — ACETAMINOPHEN 325 MG/1
650 TABLET ORAL EVERY 6 HOURS PRN
Status: DISCONTINUED | OUTPATIENT
Start: 2024-03-14 | End: 2024-03-14

## 2024-03-14 RX ORDER — AMLODIPINE BESYLATE 5 MG/1
5 TABLET ORAL DAILY
COMMUNITY

## 2024-03-14 RX ORDER — NYSTATIN 100000 U/G
1 CREAM TOPICAL 2 TIMES DAILY
COMMUNITY
End: 2024-03-26

## 2024-03-14 RX ORDER — SUMATRIPTAN 6 MG/.5ML
6 INJECTION, SOLUTION SUBCUTANEOUS ONCE
COMMUNITY
End: 2024-03-18

## 2024-03-14 RX ORDER — METOPROLOL SUCCINATE 25 MG/1
25 TABLET, EXTENDED RELEASE ORAL DAILY
Status: DISCONTINUED | OUTPATIENT
Start: 2024-03-15 | End: 2024-03-15 | Stop reason: HOSPADM

## 2024-03-14 RX ORDER — PROMETHAZINE HYDROCHLORIDE 25 MG/1
12.5-25 SUPPOSITORY RECTAL EVERY 4 HOURS PRN
Status: DISCONTINUED | OUTPATIENT
Start: 2024-03-14 | End: 2024-03-15 | Stop reason: HOSPADM

## 2024-03-14 RX ORDER — MORPHINE SULFATE 4 MG/ML
4 INJECTION INTRAVENOUS
Status: DISCONTINUED | OUTPATIENT
Start: 2024-03-14 | End: 2024-03-14

## 2024-03-14 RX ORDER — AMLODIPINE BESYLATE 5 MG/1
5 TABLET ORAL
Status: DISCONTINUED | OUTPATIENT
Start: 2024-03-15 | End: 2024-03-15 | Stop reason: HOSPADM

## 2024-03-14 RX ORDER — AMOXICILLIN 250 MG
2 CAPSULE ORAL EVERY EVENING
Status: DISCONTINUED | OUTPATIENT
Start: 2024-03-14 | End: 2024-03-15 | Stop reason: HOSPADM

## 2024-03-14 RX ORDER — OXYCODONE HYDROCHLORIDE 10 MG/1
10 TABLET ORAL
Status: DISCONTINUED | OUTPATIENT
Start: 2024-03-14 | End: 2024-03-15 | Stop reason: HOSPADM

## 2024-03-14 RX ORDER — OXYCODONE HYDROCHLORIDE 5 MG/1
5 TABLET ORAL
Status: DISCONTINUED | OUTPATIENT
Start: 2024-03-14 | End: 2024-03-15 | Stop reason: HOSPADM

## 2024-03-14 RX ORDER — TIZANIDINE 4 MG/1
4 TABLET ORAL 2 TIMES DAILY
Status: DISCONTINUED | OUTPATIENT
Start: 2024-03-14 | End: 2024-03-15 | Stop reason: HOSPADM

## 2024-03-14 RX ORDER — OXYCODONE HYDROCHLORIDE 5 MG/1
5 TABLET ORAL
Status: DISCONTINUED | OUTPATIENT
Start: 2024-03-14 | End: 2024-03-14

## 2024-03-14 RX ORDER — MORPHINE SULFATE 4 MG/ML
4 INJECTION INTRAVENOUS
Status: DISCONTINUED | OUTPATIENT
Start: 2024-03-14 | End: 2024-03-15 | Stop reason: HOSPADM

## 2024-03-14 RX ORDER — ACETAMINOPHEN 500 MG
1000 TABLET ORAL
COMMUNITY

## 2024-03-14 RX ORDER — METOCLOPRAMIDE HYDROCHLORIDE 5 MG/ML
10 INJECTION INTRAMUSCULAR; INTRAVENOUS EVERY 6 HOURS
Qty: 8 ML | Refills: 0 | Status: DISCONTINUED | OUTPATIENT
Start: 2024-03-15 | End: 2024-03-15 | Stop reason: HOSPADM

## 2024-03-14 RX ORDER — PROMETHAZINE HYDROCHLORIDE 25 MG/1
12.5-25 TABLET ORAL EVERY 4 HOURS PRN
Status: DISCONTINUED | OUTPATIENT
Start: 2024-03-14 | End: 2024-03-15 | Stop reason: HOSPADM

## 2024-03-14 RX ORDER — CHOLECALCIFEROL (VITAMIN D3) 125 MCG
10 CAPSULE ORAL
Status: DISCONTINUED | OUTPATIENT
Start: 2024-03-14 | End: 2024-03-15 | Stop reason: HOSPADM

## 2024-03-14 RX ORDER — DIPHENHYDRAMINE HCL 25 MG
25-50 TABLET ORAL 2 TIMES DAILY
Status: DISCONTINUED | OUTPATIENT
Start: 2024-03-14 | End: 2024-03-15 | Stop reason: HOSPADM

## 2024-03-14 RX ORDER — ZIPRASIDONE HYDROCHLORIDE 40 MG/1
40 CAPSULE ORAL
Status: DISCONTINUED | OUTPATIENT
Start: 2024-03-14 | End: 2024-03-15 | Stop reason: HOSPADM

## 2024-03-14 RX ORDER — HYDRALAZINE HYDROCHLORIDE 20 MG/ML
10 INJECTION INTRAMUSCULAR; INTRAVENOUS EVERY 4 HOURS PRN
Status: DISCONTINUED | OUTPATIENT
Start: 2024-03-14 | End: 2024-03-15 | Stop reason: HOSPADM

## 2024-03-14 RX ORDER — SODIUM CHLORIDE, SODIUM LACTATE, POTASSIUM CHLORIDE, CALCIUM CHLORIDE 600; 310; 30; 20 MG/100ML; MG/100ML; MG/100ML; MG/100ML
1000 INJECTION, SOLUTION INTRAVENOUS ONCE
Status: COMPLETED | OUTPATIENT
Start: 2024-03-14 | End: 2024-03-14

## 2024-03-14 RX ORDER — GABAPENTIN 400 MG/1
1200 CAPSULE ORAL 3 TIMES DAILY
COMMUNITY

## 2024-03-14 RX ORDER — ACETAMINOPHEN 325 MG/1
650 TABLET ORAL EVERY 6 HOURS PRN
Status: DISCONTINUED | OUTPATIENT
Start: 2024-03-20 | End: 2024-03-15 | Stop reason: HOSPADM

## 2024-03-14 RX ORDER — OXYCODONE HYDROCHLORIDE 10 MG/1
10 TABLET ORAL
Status: DISCONTINUED | OUTPATIENT
Start: 2024-03-14 | End: 2024-03-14

## 2024-03-14 RX ORDER — PROCHLORPERAZINE EDISYLATE 5 MG/ML
5-10 INJECTION INTRAMUSCULAR; INTRAVENOUS EVERY 4 HOURS PRN
Status: DISCONTINUED | OUTPATIENT
Start: 2024-03-14 | End: 2024-03-15 | Stop reason: HOSPADM

## 2024-03-14 RX ORDER — POLYETHYLENE GLYCOL 3350 17 G/17G
1 POWDER, FOR SOLUTION ORAL
Status: DISCONTINUED | OUTPATIENT
Start: 2024-03-14 | End: 2024-03-15 | Stop reason: HOSPADM

## 2024-03-14 RX ORDER — SODIUM CHLORIDE, SODIUM LACTATE, POTASSIUM CHLORIDE, CALCIUM CHLORIDE 600; 310; 30; 20 MG/100ML; MG/100ML; MG/100ML; MG/100ML
INJECTION, SOLUTION INTRAVENOUS CONTINUOUS
Status: DISCONTINUED | OUTPATIENT
Start: 2024-03-14 | End: 2024-03-15 | Stop reason: HOSPADM

## 2024-03-14 RX ORDER — ONDANSETRON 4 MG/1
4 TABLET, ORALLY DISINTEGRATING ORAL EVERY 4 HOURS PRN
Status: DISCONTINUED | OUTPATIENT
Start: 2024-03-14 | End: 2024-03-15 | Stop reason: HOSPADM

## 2024-03-14 RX ORDER — DIAZEPAM 5 MG/ML
5 INJECTION, SOLUTION INTRAMUSCULAR; INTRAVENOUS ONCE
Status: COMPLETED | OUTPATIENT
Start: 2024-03-14 | End: 2024-03-14

## 2024-03-14 RX ORDER — ONDANSETRON 2 MG/ML
4 INJECTION INTRAMUSCULAR; INTRAVENOUS EVERY 4 HOURS PRN
Status: DISCONTINUED | OUTPATIENT
Start: 2024-03-14 | End: 2024-03-15 | Stop reason: HOSPADM

## 2024-03-14 RX ORDER — METOCLOPRAMIDE HYDROCHLORIDE 5 MG/ML
10 INJECTION INTRAMUSCULAR; INTRAVENOUS ONCE
Status: COMPLETED | OUTPATIENT
Start: 2024-03-14 | End: 2024-03-14

## 2024-03-14 RX ORDER — TOPIRAMATE 25 MG/1
50 TABLET ORAL 2 TIMES DAILY
Status: DISCONTINUED | OUTPATIENT
Start: 2024-03-14 | End: 2024-03-15 | Stop reason: HOSPADM

## 2024-03-14 RX ORDER — PANTOPRAZOLE SODIUM 40 MG/10ML
40 INJECTION, POWDER, LYOPHILIZED, FOR SOLUTION INTRAVENOUS 2 TIMES DAILY
Status: DISCONTINUED | OUTPATIENT
Start: 2024-03-14 | End: 2024-03-15 | Stop reason: HOSPADM

## 2024-03-14 RX ORDER — RIMEGEPANT SULFATE 75 MG/75MG
75 TABLET, ORALLY DISINTEGRATING ORAL
COMMUNITY

## 2024-03-14 RX ORDER — METOCLOPRAMIDE HYDROCHLORIDE 5 MG/ML
10 INJECTION INTRAMUSCULAR; INTRAVENOUS EVERY 6 HOURS
Status: DISCONTINUED | OUTPATIENT
Start: 2024-03-15 | End: 2024-03-14

## 2024-03-14 RX ORDER — LABETALOL HYDROCHLORIDE 5 MG/ML
10 INJECTION, SOLUTION INTRAVENOUS EVERY 4 HOURS PRN
Status: DISCONTINUED | OUTPATIENT
Start: 2024-03-14 | End: 2024-03-15 | Stop reason: HOSPADM

## 2024-03-14 RX ADMIN — PANTOPRAZOLE SODIUM 40 MG: 40 INJECTION, POWDER, FOR SOLUTION INTRAVENOUS at 22:21

## 2024-03-14 RX ADMIN — DIAZEPAM 5 MG: 10 INJECTION, SOLUTION INTRAMUSCULAR; INTRAVENOUS at 18:40

## 2024-03-14 RX ADMIN — TOPIRAMATE 50 MG: 25 TABLET, FILM COATED ORAL at 23:19

## 2024-03-14 RX ADMIN — ZIPRASIDONE HYDROCHLORIDE 40 MG: 40 CAPSULE ORAL at 23:20

## 2024-03-14 RX ADMIN — Medication 10 MG: at 23:19

## 2024-03-14 RX ADMIN — SODIUM CHLORIDE, POTASSIUM CHLORIDE, SODIUM LACTATE AND CALCIUM CHLORIDE 1000 ML: 600; 310; 30; 20 INJECTION, SOLUTION INTRAVENOUS at 21:57

## 2024-03-14 RX ADMIN — DIPHENHYDRAMINE HYDROCHLORIDE 50 MG: 25 TABLET ORAL at 23:19

## 2024-03-14 RX ADMIN — IOHEXOL 25 ML: 240 INJECTION, SOLUTION INTRATHECAL; INTRAVASCULAR; INTRAVENOUS; ORAL at 19:40

## 2024-03-14 RX ADMIN — MORPHINE SULFATE 4 MG: 4 INJECTION, SOLUTION INTRAMUSCULAR; INTRAVENOUS at 22:22

## 2024-03-14 RX ADMIN — TIZANIDINE 4 MG: 4 TABLET ORAL at 23:20

## 2024-03-14 RX ADMIN — SODIUM CHLORIDE, POTASSIUM CHLORIDE, SODIUM LACTATE AND CALCIUM CHLORIDE 1000 ML: 600; 310; 30; 20 INJECTION, SOLUTION INTRAVENOUS at 18:40

## 2024-03-14 RX ADMIN — METOCLOPRAMIDE 10 MG: 5 INJECTION, SOLUTION INTRAMUSCULAR; INTRAVENOUS at 22:22

## 2024-03-14 RX ADMIN — IOHEXOL 100 ML: 350 INJECTION, SOLUTION INTRAVENOUS at 19:45

## 2024-03-14 ASSESSMENT — ENCOUNTER SYMPTOMS
MYALGIAS: 0
PALPITATIONS: 0
HEARTBURN: 1
CHILLS: 0
BLURRED VISION: 0
ABDOMINAL PAIN: 1
SHORTNESS OF BREATH: 0
VOMITING: 1
HEADACHES: 0
FEVER: 0
WEAKNESS: 1
DEPRESSION: 0
NAUSEA: 1
BRUISES/BLEEDS EASILY: 0
DIZZINESS: 0
DOUBLE VISION: 0
COUGH: 0

## 2024-03-14 ASSESSMENT — LIFESTYLE VARIABLES
TOTAL SCORE: 0
EVER HAD A DRINK FIRST THING IN THE MORNING TO STEADY YOUR NERVES TO GET RID OF A HANGOVER: NO
HOW MANY TIMES IN THE PAST YEAR HAVE YOU HAD 5 OR MORE DRINKS IN A DAY: 0
EVER FELT BAD OR GUILTY ABOUT YOUR DRINKING: NO
SUBSTANCE_ABUSE: 0
CONSUMPTION TOTAL: NEGATIVE
AVERAGE NUMBER OF DAYS PER WEEK YOU HAVE A DRINK CONTAINING ALCOHOL: 0
ALCOHOL_USE: NO
TOTAL SCORE: 0
ON A TYPICAL DAY WHEN YOU DRINK ALCOHOL HOW MANY DRINKS DO YOU HAVE: 0
TOTAL SCORE: 0
DOES PATIENT WANT TO STOP DRINKING: NO
HAVE PEOPLE ANNOYED YOU BY CRITICIZING YOUR DRINKING: NO
HAVE YOU EVER FELT YOU SHOULD CUT DOWN ON YOUR DRINKING: NO

## 2024-03-14 ASSESSMENT — FIBROSIS 4 INDEX
FIB4 SCORE: 0.51
FIB4 SCORE: 0.49

## 2024-03-14 ASSESSMENT — PAIN DESCRIPTION - PAIN TYPE: TYPE: ACUTE PAIN

## 2024-03-14 NOTE — ED TRIAGE NOTES
Kristin Balderrama  33 y.o. female  Chief Complaint   Patient presents with    Epigastric Pain     Few days ago and getting progressively worse    N/V     X 1 hr, pt unable to keep anything down. States things go into throat but don't make it to stomach.   Hx gastroporesis      Abd pain protocol ordered. No zofran ordered for pt as pt states when hospitalized last week they told her not to take zofran due to an elongated QT interval. Pt worried about anti nausea medications now. Urine cup provided for sample collection     Pt amb to triage with steady gait for above complaint utilizing cane.   Pt is alert and oriented, speaking in full sentences, follows commands and responds appropriately to questions. Not in any apparent distress. Respirations are even and unlabored.  Pt placed in lobby. Pt educated on triage process. Pt encouraged to alert staff for any changes.

## 2024-03-15 VITALS
BODY MASS INDEX: 44.56 KG/M2 | RESPIRATION RATE: 16 BRPM | OXYGEN SATURATION: 96 % | DIASTOLIC BLOOD PRESSURE: 67 MMHG | HEART RATE: 87 BPM | TEMPERATURE: 98.5 F | WEIGHT: 261.02 LBS | SYSTOLIC BLOOD PRESSURE: 114 MMHG | HEIGHT: 64 IN

## 2024-03-15 PROBLEM — R11.2 INTRACTABLE NAUSEA AND VOMITING: Status: RESOLVED | Noted: 2024-03-14 | Resolved: 2024-03-15

## 2024-03-15 LAB
ALBUMIN SERPL BCP-MCNC: 4 G/DL (ref 3.2–4.9)
ALBUMIN/GLOB SERPL: 1.8 G/DL
ALP SERPL-CCNC: 75 U/L (ref 30–99)
ALT SERPL-CCNC: 25 U/L (ref 2–50)
AMPHET UR QL SCN: NEGATIVE
ANION GAP SERPL CALC-SCNC: 15 MMOL/L (ref 7–16)
AST SERPL-CCNC: 15 U/L (ref 12–45)
BARBITURATES UR QL SCN: NEGATIVE
BASOPHILS # BLD AUTO: 0.9 % (ref 0–1.8)
BASOPHILS # BLD: 0.05 K/UL (ref 0–0.12)
BENZODIAZ UR QL SCN: NEGATIVE
BILIRUB SERPL-MCNC: 0.3 MG/DL (ref 0.1–1.5)
BUN SERPL-MCNC: 17 MG/DL (ref 8–22)
BZE UR QL SCN: NEGATIVE
CALCIUM ALBUM COR SERPL-MCNC: 9 MG/DL (ref 8.5–10.5)
CALCIUM SERPL-MCNC: 9 MG/DL (ref 8.5–10.5)
CANNABINOIDS UR QL SCN: NEGATIVE
CHLORIDE SERPL-SCNC: 107 MMOL/L (ref 96–112)
CO2 SERPL-SCNC: 16 MMOL/L (ref 20–33)
CREAT SERPL-MCNC: 0.8 MG/DL (ref 0.5–1.4)
EOSINOPHIL # BLD AUTO: 0.12 K/UL (ref 0–0.51)
EOSINOPHIL NFR BLD: 2.1 % (ref 0–6.9)
ERYTHROCYTE [DISTWIDTH] IN BLOOD BY AUTOMATED COUNT: 42.7 FL (ref 35.9–50)
FENTANYL UR QL: NEGATIVE
GFR SERPLBLD CREATININE-BSD FMLA CKD-EPI: 99 ML/MIN/1.73 M 2
GLOBULIN SER CALC-MCNC: 2.2 G/DL (ref 1.9–3.5)
GLUCOSE SERPL-MCNC: 143 MG/DL (ref 65–99)
HCT VFR BLD AUTO: 39.2 % (ref 37–47)
HGB BLD-MCNC: 14 G/DL (ref 12–16)
IMM GRANULOCYTES # BLD AUTO: 0.01 K/UL (ref 0–0.11)
IMM GRANULOCYTES NFR BLD AUTO: 0.2 % (ref 0–0.9)
LYMPHOCYTES # BLD AUTO: 2.02 K/UL (ref 1–4.8)
LYMPHOCYTES NFR BLD: 34.9 % (ref 22–41)
MAGNESIUM SERPL-MCNC: 1.8 MG/DL (ref 1.5–2.5)
MCH RBC QN AUTO: 32.4 PG (ref 27–33)
MCHC RBC AUTO-ENTMCNC: 35.7 G/DL (ref 32.2–35.5)
MCV RBC AUTO: 90.7 FL (ref 81.4–97.8)
METHADONE UR QL SCN: NEGATIVE
MONOCYTES # BLD AUTO: 0.47 K/UL (ref 0–0.85)
MONOCYTES NFR BLD AUTO: 8.1 % (ref 0–13.4)
NEUTROPHILS # BLD AUTO: 3.12 K/UL (ref 1.82–7.42)
NEUTROPHILS NFR BLD: 53.8 % (ref 44–72)
NRBC # BLD AUTO: 0 K/UL
NRBC BLD-RTO: 0 /100 WBC (ref 0–0.2)
OPIATES UR QL SCN: NEGATIVE
OXYCODONE UR QL SCN: NEGATIVE
PCP UR QL SCN: NEGATIVE
PHOSPHATE SERPL-MCNC: 3.5 MG/DL (ref 2.5–4.5)
PLATELET # BLD AUTO: 160 K/UL (ref 164–446)
PMV BLD AUTO: 11.1 FL (ref 9–12.9)
POTASSIUM SERPL-SCNC: 3.8 MMOL/L (ref 3.6–5.5)
PROPOXYPH UR QL SCN: NEGATIVE
PROT SERPL-MCNC: 6.2 G/DL (ref 6–8.2)
RBC # BLD AUTO: 4.32 M/UL (ref 4.2–5.4)
SODIUM SERPL-SCNC: 138 MMOL/L (ref 135–145)
WBC # BLD AUTO: 5.8 K/UL (ref 4.8–10.8)

## 2024-03-15 PROCEDURE — 700105 HCHG RX REV CODE 258: Mod: UD | Performed by: STUDENT IN AN ORGANIZED HEALTH CARE EDUCATION/TRAINING PROGRAM

## 2024-03-15 PROCEDURE — 96376 TX/PRO/DX INJ SAME DRUG ADON: CPT

## 2024-03-15 PROCEDURE — 96375 TX/PRO/DX INJ NEW DRUG ADDON: CPT

## 2024-03-15 PROCEDURE — 700102 HCHG RX REV CODE 250 W/ 637 OVERRIDE(OP): Mod: UD | Performed by: STUDENT IN AN ORGANIZED HEALTH CARE EDUCATION/TRAINING PROGRAM

## 2024-03-15 PROCEDURE — 84100 ASSAY OF PHOSPHORUS: CPT

## 2024-03-15 PROCEDURE — 80053 COMPREHEN METABOLIC PANEL: CPT

## 2024-03-15 PROCEDURE — 85025 COMPLETE CBC W/AUTO DIFF WBC: CPT

## 2024-03-15 PROCEDURE — 99239 HOSP IP/OBS DSCHRG MGMT >30: CPT | Mod: GC | Performed by: INTERNAL MEDICINE

## 2024-03-15 PROCEDURE — 700111 HCHG RX REV CODE 636 W/ 250 OVERRIDE (IP): Mod: JZ | Performed by: STUDENT IN AN ORGANIZED HEALTH CARE EDUCATION/TRAINING PROGRAM

## 2024-03-15 PROCEDURE — C9113 INJ PANTOPRAZOLE SODIUM, VIA: HCPCS | Mod: JZ | Performed by: STUDENT IN AN ORGANIZED HEALTH CARE EDUCATION/TRAINING PROGRAM

## 2024-03-15 PROCEDURE — A9270 NON-COVERED ITEM OR SERVICE: HCPCS | Mod: UD | Performed by: STUDENT IN AN ORGANIZED HEALTH CARE EDUCATION/TRAINING PROGRAM

## 2024-03-15 PROCEDURE — 83735 ASSAY OF MAGNESIUM: CPT

## 2024-03-15 PROCEDURE — G0378 HOSPITAL OBSERVATION PER HR: HCPCS

## 2024-03-15 RX ORDER — METOCLOPRAMIDE 5 MG/1
5 TABLET ORAL 4 TIMES DAILY
Qty: 12 TABLET | Refills: 0 | Status: SHIPPED | OUTPATIENT
Start: 2024-03-15 | End: 2024-03-18

## 2024-03-15 RX ORDER — METOCLOPRAMIDE HYDROCHLORIDE 5 MG/ML
10 INJECTION INTRAMUSCULAR; INTRAVENOUS EVERY 6 HOURS PRN
Qty: 20 ML | Refills: 0 | Status: SHIPPED | OUTPATIENT
Start: 2024-03-15 | End: 2024-03-15

## 2024-03-15 RX ORDER — METOCLOPRAMIDE 5 MG/1
5 TABLET ORAL 4 TIMES DAILY
Qty: 20 TABLET | Refills: 0 | Status: SHIPPED | OUTPATIENT
Start: 2024-03-15 | End: 2024-03-15

## 2024-03-15 RX ORDER — ONDANSETRON 4 MG/1
4 TABLET, ORALLY DISINTEGRATING ORAL EVERY 8 HOURS PRN
Qty: 15 TABLET | Refills: 0 | Status: SHIPPED | OUTPATIENT
Start: 2024-03-15 | End: 2024-03-20

## 2024-03-15 RX ORDER — ONDANSETRON 4 MG/1
4 TABLET, ORALLY DISINTEGRATING ORAL EVERY 8 HOURS PRN
Qty: 15 TABLET | Refills: 0 | Status: SHIPPED | OUTPATIENT
Start: 2024-03-15 | End: 2024-03-15

## 2024-03-15 RX ADMIN — DIPHENHYDRAMINE HYDROCHLORIDE 50 MG: 25 TABLET ORAL at 05:09

## 2024-03-15 RX ADMIN — METOPROLOL SUCCINATE 25 MG: 25 TABLET, EXTENDED RELEASE ORAL at 05:10

## 2024-03-15 RX ADMIN — ONDANSETRON 4 MG: 2 INJECTION INTRAMUSCULAR; INTRAVENOUS at 05:08

## 2024-03-15 RX ADMIN — METOCLOPRAMIDE 10 MG: 5 INJECTION, SOLUTION INTRAMUSCULAR; INTRAVENOUS at 05:08

## 2024-03-15 RX ADMIN — ACETAMINOPHEN 650 MG: 325 TABLET, FILM COATED ORAL at 05:09

## 2024-03-15 RX ADMIN — SODIUM CHLORIDE, POTASSIUM CHLORIDE, SODIUM LACTATE AND CALCIUM CHLORIDE: 600; 310; 30; 20 INJECTION, SOLUTION INTRAVENOUS at 03:49

## 2024-03-15 RX ADMIN — OXYCODONE 5 MG: 5 TABLET ORAL at 05:19

## 2024-03-15 RX ADMIN — AMLODIPINE BESYLATE 5 MG: 5 TABLET ORAL at 05:10

## 2024-03-15 RX ADMIN — PANTOPRAZOLE SODIUM 40 MG: 40 INJECTION, POWDER, FOR SOLUTION INTRAVENOUS at 05:08

## 2024-03-15 RX ADMIN — TIZANIDINE 4 MG: 4 TABLET ORAL at 05:11

## 2024-03-15 RX ADMIN — TOPIRAMATE 50 MG: 25 TABLET, FILM COATED ORAL at 05:10

## 2024-03-15 RX ADMIN — OXYCODONE 5 MG: 5 TABLET ORAL at 08:36

## 2024-03-15 RX ADMIN — LAMOTRIGINE 50 MG: 100 TABLET ORAL at 05:11

## 2024-03-15 ASSESSMENT — PAIN DESCRIPTION - PAIN TYPE: TYPE: ACUTE PAIN

## 2024-03-15 NOTE — CARE PLAN
The patient is Stable - Low risk of patient condition declining or worsening    Shift Goals  Clinical Goals: Pain control, IV fluids, Nausea control  Patient Goals: Rest, pain and nausea control  Family Goals: JODI      Problem: Fall Risk  Goal: Patient will remain free from falls  Description: Target End Date:  Prior to discharge or change in level of care    Document interventions on the [place] Fall Risk Assessment    1.  Assess for fall risk factors  2.  Implement fall precautions  Outcome: Progressing     Problem: Knowledge Deficit - Standard  Goal: Patient and family/care givers will demonstrate understanding of plan of care, disease process/condition, diagnostic tests and medications  Description: Target End Date:  1-3 days or as soon as patient condition allows    Document in Patient Education    1.  Patient and family/caregiver oriented to unit, equipment, visitation policy and means for communicating concern  2.  Complete/review Learning Assessment  3.  Assess knowledge level of disease process/condition, treatment plan, diagnostic tests and medications  4.  Explain disease process/condition, treatment plan, diagnostic tests and medications  Outcome: Progressing     Problem: Pain - Standard  Goal: Alleviation of pain or a reduction in pain to the patient’s comfort goal  Description: Target End Date:  Prior to discharge or change in level of care    Document on Vitals flowsheet    1.  Document pain using the appropriate pain scale per order or unit policy  2.  Educate and implement non-pharmacologic comfort measures (i.e. relaxation, distraction, massage, cold/heat therapy, etc.)  3.  Pain management medications as ordered  4.  Reassess pain after pain med administration per policy  5.  If opiods administered assess patient's response to pain medication is appropriate per POSS sedation scale  6.  Follow pain management plan developed in collaboration with patient and interdisciplinary team (including  palliative care or pain specialists if applicable)  Outcome: Progressing

## 2024-03-15 NOTE — DISCHARGE SUMMARY
Discharge Summary    CHIEF COMPLAINT ON ADMISSION  Chief Complaint   Patient presents with    Epigastric Pain     Few days ago and getting progressively worse    N/V     X 1 hr, pt unable to keep anything down. States things go into throat but don't make it to stomach.   Hx gastroporesis        Reason for Admission  N/V; Abdominal Pain     Admission Date  3/14/2024    CODE STATUS  Full Code    HPI & HOSPITAL COURSE    Ms. Kristin Balderrama is a 33 y.o. morbidly obese female with history of idiopathic intracranial hypertension, NPH, chronic pain syndrome, borderline personality, nephrolithiasis who presented 3/14/2024 with evaluation for intractable nausea and vomiting.      Patient is a known patient to our facility as she has had multiple admissions for similar symptoms.  Multiple workup has been done for patient including imaging with no noted acute pathologies.    Patient reported persistent nausea vomiting, unable to keep diet down.  No acute findings on CTAP, noted incidental subcutaneous gas foci.  Admission requested by ERP due to persistent nausea vomiting.  Admitted to medicine service.     Patient monitored overnight with no events noted.  Patient's symptoms were controlled with antiemetics.  Patient electrolytes within normal limits with no replacement indicated at this time.  Patient was able to tolerate her breakfast with minimal discomfort and no signs of aspiration noted.  Discussed with patient being placed on Reglan along with Zofran for use of nausea and vomiting symptoms.  Patient can resume all other home medications.  All questions and concerns answered prior to being discharged.  Patient discharged home.    Therefore, she is discharged in good and stable condition to home with close outpatient follow-up.    The patient recovered much more quickly than anticipated on admission.    Discharge Date  03/15/24      FOLLOW UP ITEMS POST DISCHARGE  Please call [phone] to schedule PCP  appointment for patient.    Required specialty appointments include:       Discharge Instructions per VANIA Torres    -Follow-up with PCP s/p hospitalization   -Utilize prescribed Reglan and Zofran to control Nausea and vomiting at home  -Drink electrolyte water to help continue replating electrolytes  -Continue all home medications    DIET: As tolerated and advance as indicated    ACTIVITY: As tolerated    DIAGNOSIS: Nausea and vomiting    Return to ER if symptoms persist, chest pain, palpitations, shortness of breath, numbness, tingling, weakness, and high fevers.      DISCHARGE DIAGNOSES  Principal Problem (Resolved):    Intractable nausea and vomiting (POA: Yes)  Active Problems:    Severe obesity (BMI >= 40) (HCC) (POA: Yes)    Mood disorder (HCC) (POA: Yes)    Normal pressure hydrocephalus (HCC) (POA: Yes)    Moderate persistent asthma without complication (POA: Yes)    SVT (supraventricular tachycardia) (POA: Yes)      FOLLOW UP  Future Appointments   Date Time Provider Department Center   3/18/2024 12:40 PM John Leon M.D. [facility] None     Torres Brody M.D.  [address] [facility] [address]  [place] [address]  [phone]    Schedule an appointment as soon as possible for a visit in 1 week(s)        MEDICATIONS ON DISCHARGE     Medication List        START taking these medications        Instructions   metoclopramide 5 MG/ML Soln  Commonly known as: Reglan   Infuse 2 mL into a venous catheter every 6 hours as needed for Nausea/Vomiting for up to 5 days.  Dose: 10 mg     ondansetron 4 MG Tbdp  Commonly known as: Zofran ODT   Take 1 Tablet by mouth every 8 hours as needed for Nausea/Vomiting (give PO if no IV route available) for up to 5 days.  Dose: 4 mg            CHANGE how you take these medications        Instructions   sodium chloride 1 GM Tabs  What changed: See the new instructions.  Commonly known as: Salt   Doctor's comments: DX Code Needed  .  TAKE 1 TABLET BY MOUTH  THREE TIMES A DAY WITH MEALS ( NOT COVERED/INS )            CONTINUE taking these medications        Instructions   acetaminophen 500 MG Tabs  Commonly known as: Tylenol   Take 1,000 mg by mouth 1 time a day as needed for Mild Pain.  Dose: 1,000 mg     adalimumab 80 MG/0.8ML injection  Commonly known as: Humira   Inject 80 mg under the skin every 14 days.  Dose: 80 mg     amLODIPine 5 MG Tabs  Commonly known as: Norvasc   Take 5 mg by mouth every day.  Dose: 5 mg     BORIC ACID VAGINAL VA   Insert 1 Suppository into the vagina 1 time a day as needed (yeast infection).  Dose: 1 Suppository     Corlanor 7.5 MG Tabs tablet  Generic drug: ivabradine   TAKE 1 TABLET BY MOUTH TWICE A DAY WITH FOOD  Dose: 7.5 mg     diphenhydrAMINE 25 MG Tabs  Commonly known as: Benadryl   Take 25-50 mg by mouth 2 times a day. 25 mg in the morning, 50 mg in the evening  Dose: 25-50 mg     docusate sodium 250 MG Caps   Take 250 mg by mouth 2 times a day.  Dose: 250 mg     Emgality 120 MG/ML Soaj  Generic drug: Galcanezumab-gnlm   Inject 120 mg under the skin every 30 (thirty) days.  Dose: 120 mg     etonogestrel 68 MG Impl implant  Commonly known as: Nexplanon   Inject 1 Each under the skin see administration instructions. Pt reports she is not sure when she had this medications injected last, pt reports she still has it in her arm  Every 3 years to change  Dose: 68 mg     gabapentin 400 MG Caps  Commonly known as: Neurontin   Take 1,200 mg by mouth 3 times a day. 3 capsules=1200mg  Dose: 1,200 mg     lamoTRIgine 25 MG Tabs  Commonly known as: LaMICtal   Take 50 mg by mouth 2 times a day. 50 mg = 2 tablets  Dose: 50 mg     Melatonin 10 MG Tabs   Take 10 mg by mouth at bedtime.  Dose: 10 mg     metoprolol SR 25 MG Tb24  Commonly known as: Toprol XL   Take 1 Tablet by mouth every day.  Dose: 25 mg     Nurtec 75 MG Tbdp  Generic drug: Rimegepant Sulfate   Take 75 mg by mouth 1 time a day as needed (migraine).  Dose: 75 mg     nystatin 046332  "UNIT/GM Crea topical cream  Commonly known as: Mycostatin   Apply 1 Application topically 2 times a day.  Dose: 1 Application     omeprazole 20 MG delayed-release capsule  Commonly known as: PriLOSEC   Take 20 mg by mouth 2 times a day.  Dose: 20 mg     SUMAtriptan Succinate 6 MG/0.5ML Soln  Commonly known as: Imitrex   Inject 6 mg under the skin one time.  Dose: 6 mg     tizanidine 4 MG Tabs  Commonly known as: Zanaflex   Take 4 mg by mouth 2 times a day.  Dose: 4 mg     topiramate 50 MG tablet  Commonly known as: Topamax   Take 50 mg by mouth 2 times a day.  Dose: 50 mg     ziprasidone 40 MG Caps  Commonly known as: Geodon   Take 1 capsule by mouth at bedtime.  Dose: 40 mg              Allergies  Allergies   Allergen Reactions    Cefdinir Shortness of Breath and Itching     Tolerated 1/18/17  Tolerates ceftriaxone  Tolerated augmentin 8/2019     Depakote [Divalproex Sodium] Unspecified     Muscle spasms/muscle pain and weakness      Doxycycline Anaphylaxis and Vomiting     Other reaction(s): pustules/blisters  Other reaction(s): stomach pain    Montelukast [Singulair] Unspecified     Cardiac effusion    Vancomycin Itching     Pt becomes flushed in face and chest.   RXN=7/10/16    Wound Dressing Adhesive Rash     By pt report-\"removes skin totally off\"    Amitriptyline Unspecified     Headaches      Amoxicillin Rash          Aripiprazole Unspecified    Aripiprazole [Abilify] Unspecified     Headaches/muscle twitching      Clindamycin Nausea         Other reaction(s): nausea stomach pain    Depakote [Divalproex Sodium]     Erythromycin Rash     .  Other reaction(s): nausea stomach pain    Flomax [Tamsulosin Hydrochloride] Swelling    Hydromorphone      Other reaction(s): vomiting    Levaquin Unspecified     Severe muscle cramps in legs  RXN=unknown  Other reaction(s): leg muscle cramps    Metformin Unspecified     Increased lactic acid     Other reaction(s): itching and rash/nausea vomiting    Tamsulosin Swelling    " " Swelling of legs    Tape Rash     Tears skin off  coban with Tegaderm tape ok intermittently  RXN=ongoing    Ampicillin Rash     Pt reports that she received a rash     Ciprofloxacin Rash          Keflex Rash     Pt states she gets a rash with this medication  Tolerates ceftriaxone  Can take with Benadryl    Levofloxacin Unspecified     Leg muscle cramps    Metronidazole Rash     \"Vision problems\"  Other reaction(s): vision problems    Montelukast     Penicillins Hives     Can take with Benadryl    Sulfa Drugs Itching, Myalgia and Hives     Muscle pain and weakness  Other reaction(s): unknown    Valproic Acid Rash     .       DIET  Orders Placed This Encounter   Procedures    Diet Order Diet: Clear Liquid     Standing Status:   Standing     Number of Occurrences:   1     Order Specific Question:   Diet:     Answer:   Clear Liquid [10]       ACTIVITY  As tolerated.  Weight bearing as tolerated    CONSULTATIONS  NONE    PROCEDURES  NONE    IMAGING  CT-ABDOMEN-PELVIS WITH   Final Result      1.  No acute abdominopelvic abnormality detected.   2.  Small subcutaneous gas foci at the low anterior ventral abdominal wall. Correlate for subcutaneous injection site, otherwise etiology is unknown.            LABORATORY  Lab Results   Component Value Date    SODIUM 138 03/15/2024    POTASSIUM 3.8 03/15/2024    CHLORIDE 107 03/15/2024    CO2 16 (L) 03/15/2024    GLUCOSE 143 (H) 03/15/2024    BUN 17 03/15/2024    CREATININE 0.80 03/15/2024    CREATININE 0.75 (L) 07/20/2010    GLOMRATE >59 07/20/2010        Lab Results   Component Value Date    WBC 5.8 03/15/2024    WBC 6.1 07/20/2010    HEMOGLOBIN 14.0 03/15/2024    HEMATOCRIT 39.2 03/15/2024    PLATELETCT 160 (L) 03/15/2024        "

## 2024-03-15 NOTE — DISCHARGE INSTRUCTIONS
FOLLOW UP ITEMS POST DISCHARGE  Please call [phone] to schedule PCP appointment for patient.    Required specialty appointments include:       Discharge Instructions per VANIA Torres    -Follow-up with PCP s/p hospitalization   -Utilize prescribed Reglan and Zofran to control Nausea and vomiting at home  -Drink electrolyte water to help continue replating electrolytes  -Continue all home medications    DIET: As tolerated and advance as indicated    ACTIVITY: As tolerated    DIAGNOSIS: Nausea and vomiting    Return to ER if symptoms persist, chest pain, palpitations, shortness of breath, numbness, tingling, weakness, and high fevers.    Nausea and Vomiting, Adult  Nausea is feeling that you have an upset stomach and that you are about to vomit. Vomiting is when food in your stomach forcefully comes out of your mouth. Vomiting can make you feel weak. If you vomit, or if you are not able to drink enough fluids, you may not have enough water in your body (get dehydrated). If you do not have enough water in your body, you may:  Feel tired.  Feel thirsty.  Have a dry mouth.  Have cracked lips.  Pee (urinate) less often.  Older adults and people with other diseases or a weak body defense system (immune system) are at higher risk for not having enough water in the body. If you feel like you may vomit or you vomit, it is important to follow instructions from your doctor about how to take care of yourself.  Follow these instructions at home:  Watch your symptoms for any changes. Tell your doctor about them.  Eating and drinking         Take an ORS (oral rehydration solution). This is a drink that is sold at pharmacies and stores.  Drink clear fluids in small amounts as you are able, such as:  Water.  Ice chips.  Fruit juice that has water added (diluted fruit juice).  Low-calorie sports drinks.  Eat bland, easy-to-digest foods in small amounts as you are able, such  as:  Bananas.  Applesauce.  Rice.  Low-fat (lean) meats.  Toast.  Crackers.  Avoid drinking fluids that have a lot of sugar or caffeine in them. This includes energy drinks, sports drinks, and soda.  Avoid alcohol.  Avoid spicy or fatty foods.  General instructions  Take over-the-counter and prescription medicines only as told by your doctor.  Drink enough fluid to keep your pee (urine) pale yellow.  Wash your hands often with soap and water for at least 20 seconds. If you cannot use soap and water, use hand .  Make sure that everyone in your home washes their hands well and often.  Rest at home until you feel better.  Watch your condition for any changes.  Take slow and deep breaths when you feel like you may vomit.  Keep all follow-up visits.  Contact a doctor if:  Your symptoms get worse.  You have new symptoms.  You have a fever.  You cannot drink fluids without vomiting.  You feel like you may vomit for more than 2 days.  You feel light-headed or dizzy.  You have a headache.  You have muscle cramps.  You have a rash.  You have pain while peeing.  Get help right away if:  You have pain in your chest, neck, arm, or jaw.  You feel very weak or you faint.  You vomit again and again.  You have vomit that is bright red or looks like black coffee grounds.  You have bloody or black poop (stools) or poop that looks like tar.  You have a very bad headache, a stiff neck, or both.  You have very bad pain, cramping, or bloating in your belly (abdomen).  You have trouble breathing.  You are breathing very quickly.  Your heart is beating very quickly.  Your skin feels cold and clammy.  You feel confused.  You have signs of losing too much water in your body, such as:  Dark pee, very little pee, or no pee.  Cracked lips.  Dry mouth.  Sunken eyes.  Sleepiness.  Weakness.  These symptoms may be an emergency. Get help right away. Call 911.  Do not wait to see if the symptoms will go away.  Do not drive yourself to the  [facility].  Summary  Nausea is feeling that you have an upset stomach and that you are about to vomit. Vomiting is when food in your stomach comes out of your mouth.  Follow instructions from your doctor about eating and drinking.  Take over-the-counter and prescription medicines only as told by your doctor.  Contact your doctor if your symptoms get worse or you have new symptoms.  Keep all follow-up visits.  This information is not intended to replace advice given to you by your health care provider. Make sure you discuss any questions you have with your health care provider.  Document Revised: 06/24/2022 Document Reviewed: 06/24/2022  Elsevier Patient Education © 2023 [employer]

## 2024-03-15 NOTE — ASSESSMENT & PLAN NOTE
IVF  Supportive antiemetic  Scheduled Reglan  IV Protonix  Clear liquid diet  Advance diet as tolerated  Aspiration precautions

## 2024-03-15 NOTE — H&P
Hospital Medicine History & Physical Note    Date of Service  3/14/2024    Primary Care Physician  Torres Brody M.D.    Consultants  None    Code Status  Full Code    Chief Complaint  Chief Complaint   Patient presents with    Epigastric Pain     Few days ago and getting progressively worse    N/V     X 1 hr, pt unable to keep anything down. States things go into throat but don't make it to stomach.   Hx gastroporesis        History of Presenting Illness  Kristin Balderrama is a 33 y.o. morbidly obese female with history of idiopathic intracranial hypertension, NPH, chronic pain syndrome, borderline personality, nephrolithiasis who presented 3/14/2024 with evaluation for intractable nausea and vomiting.  Patient reported persistent nausea vomiting, unable to keep diet down.  No acute findings on CTAP, noted incidental subcutaneous gas foci.  Admission requested by ERP due to persistent nausea vomiting.  Admitted to medicine service.    I discussed the plan of care with patient, bedside RN, and pharmacy.    Review of Systems  Review of Systems   Constitutional:  Negative for chills and fever.   HENT:  Negative for hearing loss and tinnitus.    Eyes:  Negative for blurred vision and double vision.   Respiratory:  Negative for cough and shortness of breath.    Cardiovascular:  Negative for chest pain and palpitations.   Gastrointestinal:  Positive for abdominal pain, heartburn, nausea and vomiting.   Genitourinary:  Negative for dysuria and urgency.   Musculoskeletal:  Negative for joint pain and myalgias.   Skin:  Negative for itching and rash.   Neurological:  Positive for weakness. Negative for dizziness and headaches.   Endo/Heme/Allergies:  Negative for environmental allergies. Does not bruise/bleed easily.   Psychiatric/Behavioral:  Negative for depression and substance abuse.    All other systems reviewed and are negative.      Past Medical History   has a past medical history of Abdominal pain, Anginal  syndrome, Apnea, sleep, Arrhythmia, Arthritis, ASTHMA, Back pain, Borderline personality disorder (HCC), Breath shortness, Bronchitis (02/08/2022), Cardiac arrhythmia, Chickenpox, Chronic UTI (09/18/2010), Cough, Daytime sleepiness, Dental disorder (03/08/2021), Depression, Diabetes (HCC), Diarrhea, Disorder of thyroid, Fall, Fatigue, Frequent headaches, Gasping for breath, Gynecological disorder, Headache(784.0), Heart burn, Heart murmur, History of falling, Indigestion, Migraine, Mitochondrial disease (HCC), 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    Surgical History   has a past surgical history that includes neuro dest facet l/s w/ig sngl (04/21/2015); recovery (01/27/2016); delmar by laparoscopy (08/29/2010); lumbar fusion anterior (08/21/2012); other cardiac surgery (01/2016); tonsillectomy; bowel stimulator insertion (07/13/2016); gastroscopy with balloon dilatation (N/A, 01/04/2017); muscle biopsy (Right, 01/26/2017); other abdominal surgery; laminotomy; bowel stimulator insertion (03/10/2021); pr lap,diagnostic abdomen (02/14/2022); ovarian cystectomy (Right, 02/14/2022); pr percut fix prox/neck femur fx (Left, 01/28/2023); inguinal hernia laparoscopic (Right, 07/21/2023); hernia repair (Right, 07/21/2023); pr cystoscopy,insert ureteral stent (Right, 2/12/2024); pr cysto/uretero/pyeloscopy, dx (Right, 2/12/2024); and lasertripsy (Right, 2/12/2024).     Family History  family history includes Genitourinary () Problems in her sister;  "Heart Disease in her maternal grandmother and mother; Hypertension in her maternal grandmother, maternal uncle, and mother; No Known Problems in her sister; Other in her mother and sister; Sleep Apnea in her mother.   Family history reviewed with patient. There is no family history that is pertinent to the chief complaint.     Social History   reports that she has never smoked. She has never used smokeless tobacco. She reports that she does not drink alcohol and does not use drugs.    Allergies  Allergies   Allergen Reactions    Cefdinir Shortness of Breath and Itching     Tolerated 1/18/17  Tolerates ceftriaxone  Tolerated augmentin 8/2019     Depakote [Divalproex Sodium] Unspecified     Muscle spasms/muscle pain and weakness      Doxycycline Anaphylaxis and Vomiting     Other reaction(s): pustules/blisters  Other reaction(s): stomach pain    Montelukast [Singulair] Unspecified     Cardiac effusion    Vancomycin Itching     Pt becomes flushed in face and chest.   RXN=7/10/16    Wound Dressing Adhesive Rash     By pt report-\"removes skin totally off\"    Amitriptyline Unspecified     Headaches      Amoxicillin Rash          Aripiprazole Unspecified    Aripiprazole [Abilify] Unspecified     Headaches/muscle twitching      Clindamycin Nausea         Other reaction(s): nausea stomach pain    Depakote [Divalproex Sodium]     Erythromycin Rash     .  Other reaction(s): nausea stomach pain    Flomax [Tamsulosin Hydrochloride] Swelling    Hydromorphone      Other reaction(s): vomiting    Levaquin Unspecified     Severe muscle cramps in legs  RXN=unknown  Other reaction(s): leg muscle cramps    Metformin Unspecified     Increased lactic acid     Other reaction(s): itching and rash/nausea vomiting    Tamsulosin Swelling     Swelling of legs    Tape Rash     Tears skin off  coban with Tegaderm tape ok intermittently  RXN=ongoing    Ampicillin Rash     Pt reports that she received a rash     Ciprofloxacin Rash          Keflex " "Rash     Pt states she gets a rash with this medication  Tolerates ceftriaxone  Can take with Benadryl    Levofloxacin Unspecified     Leg muscle cramps    Metronidazole Rash     \"Vision problems\"  Other reaction(s): vision problems    Montelukast     Penicillins Hives     Can take with Benadryl    Sulfa Drugs Itching, Myalgia and Hives     Muscle pain and weakness  Other reaction(s): unknown    Valproic Acid Rash     .       Medications  Prior to Admission Medications   Prescriptions Last Dose Informant Patient Reported? Taking?   Adalimumab 80 MG/0.8ML Pen-injector Kit  Patient Yes No   Sig: Inject 80 mg under the skin every 14 days.   CORLANOR 7.5 MG Tab tablet   No No   Sig: TAKE 1 TABLET BY MOUTH TWICE A DAY WITH FOOD   Galcanezumab-gnlm (EMGALITY) 120 MG/ML Solution Auto-injector  Patient Yes No   Sig: Inject 120 mg under the skin every 30 (thirty) days.   Melatonin 10 MG Tab  Patient Yes No   Sig: Take 10 mg by mouth at bedtime.   diphenhydrAMINE (BENADRYL) 25 MG Tab  Patient Yes No   Sig: Take 25-50 mg by mouth 2 times a day. 25 mg in the morning, 50 mg in the evening   docusate sodium 250 MG Cap  Patient Yes No   Sig: Take 250 mg by mouth 2 times a day.   etonogestrel (NEXPLANON) 68 MG Implant implant  Patient Yes No   Sig: Inject 1 Each under the skin see administration instructions. Pt reports she is not sure when she had this medications injected last, pt reports she still has it in her arm  Every 3 years to change   lamoTRIgine (LAMICTAL) 25 MG Tab  Patient Yes No   Sig: Take 50 mg by mouth 2 times a day. 50 mg = 2 tablets   metoprolol SR (TOPROL XL) 25 MG TABLET SR 24 HR  Patient No No   Sig: Take 1 Tablet by mouth every day.   omeprazole (PRILOSEC) 20 MG delayed-release capsule  Patient Yes No   Sig: Take 20 mg by mouth every day.   sodium chloride (SALT) 1 GM Tab  Patient No No   Sig: TAKE 1 TABLET BY MOUTH THREE TIMES A DAY WITH MEALS ( NOT COVERED/INS )   Patient taking differently: Take 1 g by " mouth 3 times a day.   tizanidine (ZANAFLEX) 4 MG Tab  Patient Yes No   Sig: Take 4 mg by mouth 2 times a day.   topiramate (TOPAMAX) 50 MG tablet  Patient Yes No   Sig: Take 50 mg by mouth 2 times a day.   ziprasidone (GEODON) 40 MG Cap  Patient No No   Sig: Take 1 capsule by mouth at bedtime.      Facility-Administered Medications: None       Physical Exam  Temp:  [36.6 °C (97.9 °F)-36.9 °C (98.4 °F)] 36.9 °C (98.4 °F)  Pulse:  [] 77  Resp:  [12-22] 20  BP: (154-193)/() 178/111  SpO2:  [95 %-98 %] 96 %  Blood Pressure: (!) 179/82   Temperature: 36.7 °C (98.1 °F)   Pulse: 73   Respiration: 19   Pulse Oximetry: 96 %       Physical Exam  Vitals and nursing note reviewed.   Constitutional:       General: She is not in acute distress.     Appearance: She is obese.   HENT:      Head: Normocephalic and atraumatic.      Nose: Nose normal.      Mouth/Throat:      Mouth: Mucous membranes are dry.      Pharynx: Oropharynx is clear.   Eyes:      General: No scleral icterus.     Extraocular Movements: Extraocular movements intact.      Pupils: Pupils are equal, round, and reactive to light.   Cardiovascular:      Rate and Rhythm: Normal rate and regular rhythm.      Pulses: Normal pulses.      Heart sounds:      No friction rub.   Pulmonary:      Effort: No respiratory distress.      Breath sounds: No wheezing or rales.   Chest:      Chest wall: No tenderness.   Abdominal:      General: There is distension.      Tenderness: There is no abdominal tenderness. There is no guarding or rebound.   Musculoskeletal:         General: Normal range of motion.      Cervical back: Neck supple. No tenderness.      Comments: Trace LE edema   Skin:     General: Skin is warm and dry.      Capillary Refill: Capillary refill takes less than 2 seconds.   Neurological:      General: No focal deficit present.      Mental Status: She is alert and oriented to person, place, and time.   Psychiatric:         Mood and Affect: Mood normal.  "        Laboratory:  Recent Labs     03/14/24  1636   WBC 7.0   RBC 4.88   HEMOGLOBIN 15.5   HEMATOCRIT 44.4   MCV 91.0   MCH 31.8   MCHC 34.9   RDW 43.3   PLATELETCT 197   MPV 11.3     Recent Labs     03/14/24  1636   SODIUM 140   POTASSIUM 3.7   CHLORIDE 110   CO2 14*   GLUCOSE 147*   BUN 21   CREATININE 0.79   CALCIUM 9.5     Recent Labs     03/14/24  1636   ALTSGPT 28   ASTSGOT 15   ALKPHOSPHAT 89   TBILIRUBIN 0.3   LIPASE 50   GLUCOSE 147*         No results for input(s): \"NTPROBNP\" in the last 72 hours.      No results for input(s): \"TROPONINT\" in the last 72 hours.    Imaging:  CT-ABDOMEN-PELVIS WITH   Final Result      1.  No acute abdominopelvic abnormality detected.   2.  Small subcutaneous gas foci at the low anterior ventral abdominal wall. Correlate for subcutaneous injection site, otherwise etiology is unknown.          EKG:  I have personally reviewed the images and compared with prior images.    Assessment/Plan:  Justification for Admission Status  I anticipate this patient is appropriate for observation status at this time.        * Intractable nausea and vomiting- (present on admission)  Assessment & Plan  IVF  Supportive antiemetic  Scheduled Reglan  IV Protonix  Clear liquid diet  Advance diet as tolerated  Aspiration precautions    SVT (supraventricular tachycardia)- (present on admission)  Assessment & Plan  Continue metoprolol, Corlanor    Moderate persistent asthma without complication- (present on admission)  Assessment & Plan  Currently not in acute exacerbation    Normal pressure hydrocephalus (HCC)- (present on admission)  Assessment & Plan  Previously  Outpatient follow-up    Mood disorder (HCC)- (present on admission)  Assessment & Plan  Continue home meds: Lamictal, Geodon    Severe obesity (BMI >= 40) ([facility])- (present on admission)  Assessment & Plan  Diet and lifestyle modification  Body mass index is 44.8 kg/m².          VTE prophylaxis: SCDs/TEDs  "

## 2024-03-15 NOTE — HOSPITAL COURSE
Ms. Kristin Balderrama is a 33 y.o. morbidly obese female with history of idiopathic intracranial hypertension, NPH, chronic pain syndrome, borderline personality, nephrolithiasis who presented 3/14/2024 with evaluation for intractable nausea and vomiting.      Patient is a known patient to our facility as she has had multiple admissions for similar symptoms.  Multiple workup has been done for patient including imaging with no noted acute pathologies.    Patient reported persistent nausea vomiting, unable to keep diet down.  No acute findings on CTAP, noted incidental subcutaneous gas foci.  Admission requested by ERP due to persistent nausea vomiting.  Admitted to medicine service.     Patient monitored overnight with no events noted.  Patient's symptoms were controlled with antiemetics.  Patient electrolytes within normal limits with no replacement indicated at this time.  Patient was able to tolerate her breakfast with minimal discomfort and no signs of aspiration noted.  Discussed with patient being placed on Reglan along with Zofran for use of nausea and vomiting symptoms.  Patient can resume all other home medications.  All questions and concerns answered prior to being discharged.  Patient discharged home.

## 2024-03-15 NOTE — CARE PLAN
The patient is Stable - Low risk of patient condition declining or worsening    Shift Goals  Clinical Goals: Pain control, IV fluids, Nausea control  Patient Goals: Rest, pain and nausea control  Family Goals: JODI    Progress made toward(s) clinical / shift goals:    Problem: Fall Risk  Goal: Patient will remain free from falls  Outcome: Progressing     Problem: Pain - Standard  Goal: Alleviation of pain or a reduction in pain to the patient’s comfort goal  Outcome: Progressing       Patient is not progressing towards the following goals:

## 2024-03-15 NOTE — ED PROVIDER NOTES
"  ER Provider Note    Scribed for Jimmy Abbott M.D. by Rasta Moncada. 3/14/2024  5:58 PM    Primary Care Provider: Torres Brody M.D.  Means of Arrival: Epigastric pain  History obtained from: Patient  History limited by: None      CHIEF COMPLAINT  Epigastric pain    HPI  Kristin Balderrama is a 33 y.o. female who presents to the ED complaining of epigastric pain onset 2 days ago. Patient reports she has been experiencing on going epigastric pain that has been getting progressively getting worse. She notes that she is not able to keep anything fluid/food down as she keep on \"throwing it up\". She describes it as \" things go into throat but don't make it to stomach.\" Patient has associated symptoms of a measured fever of 100.1 °F     REVIEW OF SYSTEMS  ROS    PAST MEDICAL HISTORY  Past Medical History:   Diagnosis Date    Abdominal pain     Anginal syndrome     random chest pain especially with tachycardia    Apnea, sleep     Arrhythmia     \"sinus tachycardia\", cariologist, Dr. Kumar; ablation 2/2016    Arthritis     osteo    ASTHMA     when around smoke    Back pain     Borderline personality disorder (HCC)     Breath shortness     with tachycardia    Bronchitis 02/08/2022    Last time was 12/21    Cardiac arrhythmia     Chickenpox     Chronic UTI 09/18/2010    Cough     Daytime sleepiness     Dental disorder 03/08/2021    infected tooth    Depression     Diabetes (HCC)     Diarrhea     incontinece    Disorder of thyroid     Hashimoto's    Fall     Fatigue     Frequent headaches     Gasping for breath     Gynecological disorder     PCOS    Headache(784.0)     Heart burn     Heart murmur     History of falling     Indigestion     Migraine     Mitochondrial disease (HCC)     Multiple personality disorder (HCC)     Nausea     Obesity     Other fatigue 06/29/2020    Pain 08/15/2012    back, 7/10    Painful joint     PCOS (polycystic ovarian syndrome)     Pneumonia 2012 12/2020    POTS (postural orthostatic tachycardia " "syndrome)     Psychosis (HCC)     Ringing in ears     Scoliosis     Shortness of breath     O2 as needed    Sinus tachycardia 10/31/2013    Sleep apnea     CPAP \"pulmonary doctor took me off mid year 2016\"    Snoring     Supraventricular tachycardia 4/10/2019    Tonsillitis     Transverse myelitis (HCC)     2/8/22: Per pt: not anymore    Tuberculosis     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    Urinary bladder disorder     Suprapubic cath. 2/8/22: Not anymore.     Urinary incontinence     Weakness     Wears glasses        SURGICAL HISTORY  Past Surgical History:   Procedure Laterality Date    WA CYSTOSCOPY,INSERT URETERAL STENT Right 2/12/2024    Procedure: CYSTOSCOPY, WITH RIGHT URETEROSCOPY, WITH LITHOTRIPSY, WITH INSERTION OF RIGHT URETERAL STENT;  Surgeon: Josafat Roberson M.D.;  Location: [facility];  Service: Urology    WA CYSTO/URETERO/PYELOSCOPY, DX Right 2/12/2024    Procedure: URETEROSCOPY;  Surgeon: Josafat Roberson M.D.;  Location: [facility];  Service: Urology    LASERTRIPSY Right 2/12/2024    Procedure: LITHOTRIPSY, USING LASER;  Surgeon: Josafat Roberson M.D.;  Location: [facility];  Service: Urology    INGUINAL HERNIA LAPAROSCOPIC Right 07/21/2023    Procedure: LAPAROSCOPIC RIGHT INGUINAL HERNIA REPAIR WITH MESH;  Surgeon: Joe Noyola M.D.;  Location: [facility];  Service: General    HERNIA REPAIR Right 07/21/2023    PB PERCUT FIX PBOX/NECK FEMUR FX Left 01/28/2023    Procedure: FIXATION, HIP, USING CANNULATED SCREW;  Surgeon: Noman Abdul M.D.;  Location: [facility];  Service: Orthopedics    WA LAP,DIAGNOSTIC ABDOMEN  02/14/2022    Procedure: LAPAROSCOPY;  Surgeon: Seamus Pisano M.D.;  Location: SURGERY SAME DAY [facility];  Service: Gynecology    OVARIAN CYSTECTOMY Right 02/14/2022    Procedure: EXCISION, CYST, OVARY;  Surgeon: Seamus Pisano M.D.;  Location: SURGERY SAME DAY [facility];  Service: Gynecology    BOWEL STIMULATOR INSERTION  " 03/10/2021    Procedure: INSERTION, ELECTRODE LEADS AND PULSE GENERATOR, NEUROSTIMULATOR, SACRAL - REMOVAL OF INTERSTIM WITH REPLACEMENT OF SACRAL NEUROMODULATION DEVICE;  Surgeon: Joe Noyola M.D.;  Location: SURGERY [facility];  Service: General    MUSCLE BIOPSY Right 01/26/2017    Procedure: MUSCLE BIOPSY - THIGH;  Surgeon: Isidro Vigil M.D.;  Location: SURGERY [facility];  Service:     GASTROSCOPY WITH BALLOON DILATATION N/A 01/04/2017    Procedure: GASTROSCOPY WITH DILATATION;  Surgeon: Torres Vargas M.D.;  Location: SURGERY [facility];  Service:     BOWEL STIMULATOR INSERTION  07/13/2016    Procedure: BOWEL STIMULATOR INSERTION FOR PERMANENT INTERSTIM SACRAL IMPLANT;  Surgeon: Joe Noyola M.D.;  Location: SURGERY [facility];  Service:     RECOVERY  01/27/2016    Procedure: CATH LAB EP STUDY WITH SINUS NODE MODIFICATION LA;  Surgeon: Recoveryonly Surgery;  Location: SURGERY PRE-POST PROC UNIT [facility];  Service:     OTHER CARDIAC SURGERY  01/2016    cardiac ablation    NEURO DEST FACET L/S W/IG SNGL  04/21/2015    Performed by Reza Tabor at SUR[facility]ERY [facility]    LUMBAR FUSION ANTERIOR  08/21/2012    Performed by SUSIE SAWANT at SURGERY [facility]    ALYSSA BY LAPAROSCOPY  08/29/2010    Performed by SHAYY JOHNS at SURGERY [facility]    LAMINOTOMY      OTHER ABDOMINAL SURGERY      TONSILLECTOMY      tonsillectomy       FAMILY HISTORY  Family History   Problem Relation Age of Onset    Hypertension Mother     Sleep Apnea Mother     Heart Disease Mother     Other Mother         hypothryod    Hypertension Maternal Uncle     Heart Disease Maternal Grandmother     Hypertension Maternal Grandmother     No Known Problems Sister     Other Sister         Narcolepsy;fibromyalsia;bone;nerve    Genitourinary () Problems Sister         endometriosis       SOCIAL HISTORY   reports that she has never smoked. She has never used smokeless tobacco. She reports that she does not  drink alcohol and does not use drugs.    CURRENT MEDICATIONS  Previous Medications    ADALIMUMAB 80 MG/0.8ML PEN-INJECTOR KIT    Inject 80 mg under the skin every 14 days.    CORLANOR 7.5 MG TAB TABLET    TAKE 1 TABLET BY MOUTH TWICE A DAY WITH FOOD    DIPHENHYDRAMINE (BENADRYL) 25 MG TAB    Take 25-50 mg by mouth 2 times a day. 25 mg in the morning, 50 mg in the evening    DOCUSATE SODIUM 250 MG CAP    Take 250 mg by mouth 2 times a day.    ETONOGESTREL (NEXPLANON) 68 MG IMPLANT IMPLANT    Inject 1 Each under the skin see administration instructions. Pt reports she is not sure when she had this medications injected last, pt reports she still has it in her arm  Every 3 years to change    GALCANEZUMAB-GNLM (EMGALITY) 120 MG/ML SOLUTION AUTO-INJECTOR    Inject 120 mg under the skin every 30 (thirty) days.    LAMOTRIGINE (LAMICTAL) 25 MG TAB    Take 50 mg by mouth 2 times a day. 50 mg = 2 tablets    MELATONIN 10 MG TAB    Take 10 mg by mouth at bedtime.    METOPROLOL SR (TOPROL XL) 25 MG TABLET SR 24 HR    Take 1 Tablet by mouth every day.    OMEPRAZOLE (PRILOSEC) 20 MG DELAYED-RELEASE CAPSULE    Take 20 mg by mouth every day.    SODIUM CHLORIDE (SALT) 1 GM TAB    TAKE 1 TABLET BY MOUTH THREE TIMES A DAY WITH MEALS ( NOT COVERED/INS )    TIZANIDINE (ZANAFLEX) 4 MG TAB    Take 4 mg by mouth 2 times a day.    TOPIRAMATE (TOPAMAX) 50 MG TABLET    Take 50 mg by mouth 2 times a day.    ZIPRASIDONE (GEODON) 40 MG CAP    Take 1 capsule by mouth at bedtime.       ALLERGIES  Cefdinir, Depakote [divalproex sodium], Doxycycline, Montelukast [singulair], Vancomycin, Wound dressing adhesive, Amitriptyline, Amoxicillin, Aripiprazole, Aripiprazole [abilify], Clindamycin, Depakote [divalproex sodium], Erythromycin, Flomax [tamsulosin hydrochloride], Hydromorphone, Levaquin, Metformin, Tamsulosin, Tape, Ampicillin, Ciprofloxacin, Keflex, Levofloxacin, Metronidazole, Montelukast, Penicillins, Sulfa drugs, and Valproic acid    PHYSICAL  "EXAM  Constitutional: Alert in no apparent distress.  HENT: No signs of trauma, Bilateral external ears normal, Nose normal. Uvula midline.   Eyes: Pupils are equal and reactive, Conjunctiva normal, Non-icteric.   Neck: Normal range of motion, No tenderness, Supple, No stridor.   Lymphatic: No lymphadenopathy noted.   Cardiovascular: Regular rate and rhythm, no murmurs.   Thorax & Lungs: Normal breath sounds, No respiratory distress, No wheezing, No chest tenderness.   Abdomen: Bowel sounds normal, Soft, No tenderness, No peritoneal signs, No masses, No pulsatile masses.   Skin: Warm, Dry, No erythema, No rash.   Back: No bony tenderness, No CVA tenderness.   Extremities: Intact distal pulses, No edema, No tenderness, No cyanosis.  Musculoskeletal: Good range of motion in all major joints. No tenderness to palpation or major deformities noted.   Neurologic: Alert , Normal motor function, Normal sensory function, No focal deficits noted.   Psychiatric: Affect normal, Judgment normal, Mood normal.      VITAL SIGNS:   BP (!) 154/98   Pulse (!) 110   Temp 36.6 °C (97.9 °F) (Temporal)   Resp 18   Ht 1.626 m (5' 4\")   Wt 118 kg (261 lb 3.9 oz)   SpO2 98%   BMI 44.84 kg/m²     DIAGNOSTIC STUDIES      Labs:   Results for orders placed or performed during the hospital encounter of 03/14/24   CBC with Differential   Result Value Ref Range    WBC 7.0 4.8 - 10.8 K/uL    RBC 4.88 4.20 - 5.40 M/uL    Hemoglobin 15.5 12.0 - 16.0 g/dL    Hematocrit 44.4 37.0 - 47.0 %    MCV 91.0 81.4 - 97.8 fL    MCH 31.8 27.0 - 33.0 pg    MCHC 34.9 32.2 - 35.5 g/dL    RDW 43.3 35.9 - 50.0 fL    Platelet Count 197 164 - 446 K/uL    MPV 11.3 9.0 - 12.9 fL    Neutrophils-Polys 57.70 44.00 - 72.00 %    Lymphocytes 31.20 22.00 - 41.00 %    Monocytes 8.10 0.00 - 13.40 %    Eosinophils 1.90 0.00 - 6.90 %    Basophils 0.70 0.00 - 1.80 %    Immature Granulocytes 0.40 0.00 - 0.90 %    Nucleated RBC 0.00 0.00 - 0.20 /100 WBC    Neutrophils (Absolute) " 4.05 1.82 - 7.42 K/uL    Lymphs (Absolute) 2.19 1.00 - 4.80 K/uL    Monos (Absolute) 0.57 0.00 - 0.85 K/uL    Eos (Absolute) 0.13 0.00 - 0.51 K/uL    Baso (Absolute) 0.05 0.00 - 0.12 K/uL    Immature Granulocytes (abs) 0.03 0.00 - 0.11 K/uL    NRBC (Absolute) 0.00 K/uL   Complete Metabolic Panel   Result Value Ref Range    Sodium 140 135 - 145 mmol/L    Potassium 3.7 3.6 - 5.5 mmol/L    Chloride 110 96 - 112 mmol/L    Co2 14 (L) 20 - 33 mmol/L    Anion Gap 16.0 7.0 - 16.0    Glucose 147 (H) 65 - 99 mg/dL    Bun 21 8 - 22 mg/dL    Creatinine 0.79 0.50 - 1.40 mg/dL    Calcium 9.5 8.5 - 10.5 mg/dL    Correct Calcium 8.9 8.5 - 10.5 mg/dL    AST(SGOT) 15 12 - 45 U/L    ALT(SGPT) 28 2 - 50 U/L    Alkaline Phosphatase 89 30 - 99 U/L    Total Bilirubin 0.3 0.1 - 1.5 mg/dL    Albumin 4.8 3.2 - 4.9 g/dL    Total Protein 7.2 6.0 - 8.2 g/dL    Globulin 2.4 1.9 - 3.5 g/dL    A-G Ratio 2.0 g/dL   Lipase   Result Value Ref Range    Lipase 50 11 - 82 U/L   Urinalysis    Specimen: Urine   Result Value Ref Range    Color Yellow     Character Clear     Specific Gravity 1.025 <1.035    Ph 5.0 5.0 - 8.0    Glucose Negative Negative mg/dL    Ketones Negative Negative mg/dL    Protein Negative Negative mg/dL    Bilirubin Negative Negative    Urobilinogen, Urine 0.2 Negative    Nitrite Negative Negative    Leukocyte Esterase Negative Negative    Occult Blood Negative Negative    Micro Urine Req see below    ESTIMATED GFR   Result Value Ref Range    GFR (CKD-EPI) 100 >60 mL/min/1.73 m 2   EKG   Result Value Ref Range    Report       [facility].    Test Date:  2024  Pt Name:    HARLEY DE LA CRUZ              Department: ER  MRN:        [id number]                      Room:       RD 12  Gender:     Female                       Technician: [id number]  :        1990                   Requested By:JIMMY ABBOTT  Order #:    [id number]                    Reading MD: Jimmy Abbott,  MD    Measurements  Intervals                                Axis  Rate:       84                           P:          64  AK:         150                          QRS:        35  QRSD:       98                           T:          37  QT:         366  QTc:        433    Interpretive Statements  Sinus rhythm  Compared to ECG 2024 13:22:29  T-wave abnormality no longer present  Electronically Signed On 2024 21:42:21 PDT by Jimmy Abbott MD       *Note: Due to a large number of results and/or encounters for the requested time period, some results have not been displayed. A complete set of results can be found in Results Review.      All labs reviewed by me.    EK Lead EKG interpreted by me to show n  Results for orders placed or performed during the hospital encounter of 24   EKG   Result Value Ref Range    Report       [facility].    Test Date:  2024  Pt Name:    HARLEY DE LA CRUZ              Department: ER  MRN:        [id number]                      Room:       [facility]  Gender:     Female                       Technician: [id number]  :        1990                   Requested By:JIMMY ABBOTT  Order #:    [id number]                    Reading MD: Jimmy Abbott MD    Measurements  Intervals                                Axis  Rate:       84                           P:          64  AK:         150                          QRS:        35  QRSD:       98                           T:          37  QT:         366  QTc:        433    Interpretive Statements  Sinus rhythm  Compared to ECG 2024 13:22:29  T-wave abnormality no longer present  Electronically Signed On 2024 21:42:21 PDT by Jimmy Abbott MD       *Note: Due to a large number of results and/or encounters for the requested time period, some results have not been displayed. A complete set of results can be found in Results Review.        Radiology:   CT-ABDOMEN-PELVIS WITH   Final Result  "     1.  No acute abdominopelvic abnormality detected.   2.  Small subcutaneous gas foci at the low anterior ventral abdominal wall. Correlate for subcutaneous injection site, otherwise etiology is unknown.          The radiologist's interpretation of all radiological studies have been reviewed by me.    COURSE & MEDICAL DECISION MAKING     Nursing notes, vital signs, PMSFSHx reviewed in chart     6:14- Kristin Balderrama is a 33 y.o. female who was evaluated at bedside for epigastric pain onset 2 days ago. Patient reports she has been experiencing on going epigastric pain that has been getting progressively getting worse. She notes that she is not able to keep anything fluid/food down as she keep on \"throwing it up\". She describes it as \" things go into throat but don't make it to stomach.\" Patient has associated symptoms of a measured fever of 100.1 °F . I will order a CT scan to further evaluation.     9:25 PM Spoke with Dr. Gamez (Hospitalist) regarding the patient. They have accepted the patient for admission.      Differential diagnoses include but not limited to     #vomiting and diarrhea  No blood in vomit or diarrhea.  No recent trauma or foreign travel.  No focal RLQ abdominal pain to suggest appendicitis.    Given persistent abdominal pain CT obtained with no SBO  Patient given IV fluids and Valium  Patient persistently nauseous therefore given additional dose of Reglan  Patient with inability tolerate p.o. plan for admission      Intravenous fluids administered for vomiting.  Patient not appropriate for oral rehydration, as surgical process needs to be ruled out before trial of oral rehydration.   On repeat evaluation, mild improvement.  Pt w/ positive fluid response.      PLAN   Patient was treated with Omnipaque 350 mg/ml, Valium 5 mg for her symptoms. Will order CT-abdomen-pelvis, UA, Estimated GFR, CBC w/diff, CMP, Lipase to evaluate her complaints.        DISPOSITION:  Patient will be " hospitalized by Dr. Gamez (Hospitalist)  in guarded condition.     CONDITION AT DISPOSITION  stable    FINAL IMPRESSION   1. Nausea and vomiting, unspecified vomiting type    2. Acute abdominal pain           Rasta RODRIGUEZ (Carolibsadie), am scribing for, and in the presence of,Jimmy Abbott M.D.    Electronically signed by: Rasta Moncada (Carolibsadie), 3/14/2024    IJimmy M.D. personally performed the services described in this documentation, as scribed by Rasta Moncada in my presence, and it is both accurate and complete.   The note accurately reflects work and decisions made by me.  Jimmy Abbott M.D.  3/14/2024  9:43 PM

## 2024-03-15 NOTE — PROGRESS NOTES
4 Eyes Skin Assessment Completed by SONYA Donovan and Kip ACT-A    Head WDL  Ears WDL  Nose WDL  Mouth WDL  Neck WDL  Breast/Chest WDL  Shoulder Blades WDL  Spine WDL  (R) Arm/Elbow/Hand WDL  (L) Arm/Elbow/Hand WDL  Abdomen WDL  Groin Very mild redness from recent yeast infection per patient   Scrotum/Coccyx/Buttocks WDL  (R) Leg WDL  (L) Leg WDL  (R) Heel/Foot/Toe WDL  (L) Heel/Foot/Toe WDL          Devices In Places Blood Pressure Cuff and Pulse Ox      Interventions In Place Pillows    Possible Skin Injury No    Pictures Uploaded Into Epic N/A  Wound Consult Placed N/A  RN Wound Prevention Protocol Ordered No

## 2024-03-15 NOTE — PROGRESS NOTES
Patient's blood pressure 178/111 on admit to CDU. Patient was given PRN morphine and an hour later given scheduled PO meds including zanaflex and geodon. After administration of those medications, patient's blood pressure dropped to 91/54. Patient appears asymptomatic and does not report any symptoms of hypotension. Reported to Lashawn Boudreaux DNP who acknowledged and no new ordered placed.

## 2024-03-15 NOTE — ED NOTES
Medication history reviewed with patient at bedside.   Med rec is complete  Allergies reviewed.   Patient has not had any outpatient antibiotics in the last 30 days.   Anticoagulants: No      Ulisses Mckeon

## 2024-03-18 ENCOUNTER — OFFICE VISIT (OUTPATIENT)
Dept: CARDIOLOGY | Facility: MEDICAL CENTER | Age: 34
End: 2024-03-18
Attending: INTERNAL MEDICINE
Payer: MEDICARE

## 2024-03-18 VITALS
OXYGEN SATURATION: 96 % | HEART RATE: 67 BPM | BODY MASS INDEX: 45.41 KG/M2 | HEIGHT: 64 IN | SYSTOLIC BLOOD PRESSURE: 142 MMHG | WEIGHT: 266 LBS | DIASTOLIC BLOOD PRESSURE: 91 MMHG

## 2024-03-18 DIAGNOSIS — G90.A POSTURAL ORTHOSTATIC TACHYCARDIA SYNDROME: ICD-10-CM

## 2024-03-18 DIAGNOSIS — I47.11 INAPPROPRIATE SINUS TACHYCARDIA (HCC): ICD-10-CM

## 2024-03-18 DIAGNOSIS — M35.7 HYPERMOBILITY SYNDROME: ICD-10-CM

## 2024-03-18 PROCEDURE — 3077F SYST BP >= 140 MM HG: CPT | Performed by: INTERNAL MEDICINE

## 2024-03-18 PROCEDURE — 3080F DIAST BP >= 90 MM HG: CPT | Performed by: INTERNAL MEDICINE

## 2024-03-18 PROCEDURE — 99213 OFFICE O/P EST LOW 20 MIN: CPT | Performed by: INTERNAL MEDICINE

## 2024-03-18 PROCEDURE — 99214 OFFICE O/P EST MOD 30 MIN: CPT | Performed by: INTERNAL MEDICINE

## 2024-03-18 RX ORDER — IVABRADINE 7.5 MG/1
7.5 TABLET, FILM COATED ORAL 2 TIMES DAILY WITH MEALS
Qty: 60 TABLET | Refills: 11 | Status: SHIPPED | OUTPATIENT
Start: 2024-03-18

## 2024-03-18 RX ORDER — SUMATRIPTAN 20 MG/1
SPRAY NASAL
COMMUNITY
Start: 2024-03-05 | End: 2024-03-26

## 2024-03-18 RX ORDER — IPRATROPIUM BROMIDE AND ALBUTEROL SULFATE 2.5; .5 MG/3ML; MG/3ML
SOLUTION RESPIRATORY (INHALATION)
COMMUNITY
End: 2024-03-18

## 2024-03-18 RX ORDER — ALBUTEROL SULFATE 0.63 MG/3ML
0.63 SOLUTION RESPIRATORY (INHALATION) EVERY 4 HOURS PRN
COMMUNITY
End: 2024-03-26

## 2024-03-18 ASSESSMENT — ENCOUNTER SYMPTOMS
SHORTNESS OF BREATH: 0
LOSS OF CONSCIOUSNESS: 0
COUGH: 0
DIZZINESS: 0
PALPITATIONS: 0
MYALGIAS: 0

## 2024-03-18 ASSESSMENT — FIBROSIS 4 INDEX: FIB4 SCORE: .6375

## 2024-03-18 NOTE — PROGRESS NOTES
Chief Complaint   Patient presents with    Chest Pain    Atrial Fibrillation    Tachycardia     F/V Dx: Inappropriate sinus tachycardia       Subjective     Kristin Balderrama is a 31 y.o. female who presents today for cardiac care.    The patient had sinus tachycardia, ablation 2016 for sinus node modification for IST (Inappropriate Sinus Tachycardia), IST ablation 2016, POTS on chronic Corlanor and beta-blocker therapy, hypermobility syndrome, TONYA, IIH (idiopathic intracranial hypertension), NPH normal pressure hydrocephalus, optic neuropathy, chronic pain syndrome, bladder incontinence, S/P sacral stimulator, cervical neuralgia with leg weakness, nephrolithiasis S/P kidney stone extraction stent placement 2/14/2024, hip fracture 1/27/2023 H/O psychiatric issues.    Since 8/3/2023 appointment the patient the patient has done well from a strictly cardiac standpoint.  However she has had [facility] ER visits or hospitalized several times this year including 1/16/2024 for cervical neuralgia ultimately managed with chiropractic manipulation (Jonathan Wise DC, personal friend of patient), 2/14/2024 kidney stone extraction, stent placement, 2/18/2024 for pyelonephritis, 3/14/2024 for intractable nausea, vomiting due to gastroparesis.  Gastroparesis being managed by Reinier Preston MD, surgeon [facility].  Had a trial of Botox which was successful and has a follow-up appointment to consider surgical intervention.  Has follow-up with GI consultants for possible colonic bleeding.  For EDS utilizing a Body Braid developed by Laron Patel MD, [employer] position with home the patient's older daughter who has severe EDS is employed.  No cardiac issues has had no cardiac symptoms including chest pain, palpitations, shortness of breath.    Since 11/10/2022 appointment the patient she was seen by Reinier Leonardo MD.  She has had chronic problems with an inguinal and ventral hernia.  She recently underwent successful laparoscopic inguinal  "hernia repair.  Postop she states that with the narcotic therapy her POTS acted up and she had a syncopal episode but is recovering with anticipated endoscopic ventral hernia repair in the future.  She has had chronic chest pain symptoms with ER visits.  She contacted the office and started on 7/3/2023 we tried empiric therapy switching metoprolol to diltiazem however the patient does not feel that its helping and would like to go back to metoprolol.  She also feels switching to diltiazem as allowed for additional headaches.      Additionally she states that genetic testing has returned with a profile suggesting hypermobility syndrome but no specific genetic defect to confirm EDS (Erler's Danlos syndrome)    Past medical history  The patient has had chronic symptoms concerning for EDS (Erler's Danlos syndrome) recently confirmed in her sister by genetic testing in [place], the patient requires some more detailed genetic testing however she cannot afford it nonetheless she has had chronic problems with polyarticular joint pain, hypermobility, easy bruisability, \"my tissues tear\", problems with \"weakened esophagus, brain fog, weak larynx\", currently with a right ankle problem that is \"going out\" requiring the use of a cane for stabilization.      Past Medical History:   Diagnosis Date    Abdominal pain     Anginal syndrome     random chest pain especially with tachycardia    Apnea, sleep     Arrhythmia     \"sinus tachycardia\", cariologist, Dr. Kumar; ablation 2/2016    Arthritis     osteo    ASTHMA     when around smoke    Back pain     Borderline personality disorder (HCC)     Breath shortness     with tachycardia    Bronchitis 02/08/2022    Last time was 12/21    Cardiac arrhythmia     Chickenpox     Chronic UTI 09/18/2010    Cough     Daytime sleepiness     Dental disorder 03/08/2021    infected tooth    Depression     Diabetes (HCC)     Diarrhea     incontinece    Disorder of thyroid     Hashimoto's    Fall     " "Fatigue     Frequent headaches     Gasping for breath     Gynecological disorder     PCOS    Headache(784.0)     Heart burn     Heart murmur     History of falling     Indigestion     Migraine     Mitochondrial disease (HCC)     Multiple personality disorder (HCC)     Nausea     Obesity     Other fatigue 06/29/2020    Pain 08/15/2012    back, 7/10    Painful joint     PCOS (polycystic ovarian syndrome)     Pneumonia 2012 12/2020    POTS (postural orthostatic tachycardia syndrome)     Psychosis (HCC)     Ringing in ears     Scoliosis     Shortness of breath     O2 as needed    Sinus tachycardia 10/31/2013    Sleep apnea     CPAP \"pulmonary doctor took me off mid year 2016\"    Snoring     Supraventricular tachycardia 4/10/2019    Tonsillitis     Transverse myelitis (HCC)     2/8/22: Per pt: not anymore    Tuberculosis     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    Urinary bladder disorder     Suprapubic cath. 2/8/22: Not anymore.     Urinary incontinence     Weakness     Wears glasses      Past Surgical History:   Procedure Laterality Date    KS CYSTOSCOPY,INSERT URETERAL STENT Right 2/12/2024    Procedure: CYSTOSCOPY, WITH RIGHT URETEROSCOPY, WITH LITHOTRIPSY, WITH INSERTION OF RIGHT URETERAL STENT;  Surgeon: Josafat Roberson M.D.;  Location: [facility];  Service: Urology    KS CYSTO/URETERO/PYELOSCOPY, DX Right 2/12/2024    Procedure: URETEROSCOPY;  Surgeon: Josafat Roberson M.D.;  Location: [facility];  Service: Urology    LASERTRIPSY Right 2/12/2024    Procedure: LITHOTRIPSY, USING LASER;  Surgeon: Josafat Roberson M.D.;  Location: [facility];  Service: Urology    INGUINAL HERNIA LAPAROSCOPIC Right 07/21/2023    Procedure: LAPAROSCOPIC RIGHT INGUINAL HERNIA REPAIR WITH MESH;  Surgeon: Joe Noyola M.D.;  Location: [facility];  Service: General    HERNIA REPAIR Right 07/21/2023    PB PERCUT FIX PBOX/NECK FEMUR FX Left 01/28/2023    Procedure: FIXATION, HIP, USING CANNULATED " SCREW;  Surgeon: Noman Abdul M.D.;  Location: SURGERY [facility];  Service: Orthopedics    CO LAP,DIAGNOSTIC ABDOMEN  02/14/2022    Procedure: LAPAROSCOPY;  Surgeon: Seamus Pisano M.D.;  Location: SURGERY SAME DAY [facility];  Service: Gynecology    OVARIAN CYSTECTOMY Right 02/14/2022    Procedure: EXCISION, CYST, OVARY;  Surgeon: Seamus Pisano M.D.;  Location: SURGERY SAME DAY [facility];  Service: Gynecology    BOWEL STIMULATOR INSERTION  03/10/2021    Procedure: INSERTION, ELECTRODE LEADS AND PULSE GENERATOR, NEUROSTIMULATOR, SACRAL - REMOVAL OF INTERSTIM WITH REPLACEMENT OF SACRAL NEUROMODULATION DEVICE;  Surgeon: Joe Noyola M.D.;  Location: SURGERY [facility];  Service: General    MUSCLE BIOPSY Right 01/26/2017    Procedure: MUSCLE BIOPSY - THIGH;  Surgeon: Isidro Vigil M.D.;  Location: [facility];  Service:     GASTROSCOPY WITH BALLOON DILATATION N/A 01/04/2017    Procedure: GASTROSCOPY WITH DILATATION;  Surgeon: Torres Vargas M.D.;  Location: [facility];  Service:     BOWEL STIMULATOR INSERTION  07/13/2016    Procedure: BOWEL STIMULATOR INSERTION FOR PERMANENT INTERSTIM SACRAL IMPLANT;  Surgeon: Joe Noyola M.D.;  Location: [facility];  Service:     RECOVERY  01/27/2016    Procedure: CATH LAB EP STUDY WITH SINUS NODE MODIFICATION LA;  Surgeon: Recoveryonly Surgery;  Location: SURGERY PRE-POST PROC UNIT [facility];  Service:     OTHER CARDIAC SURGERY  01/2016    cardiac ablation    NEURO DEST FACET L/S W/IG SNGL  04/21/2015    Performed by Reza Tabor at SURGERY [facility]    LUMBAR FUSION ANTERIOR  08/21/2012    Performed by SUSIE SAWANT at SURGERY [facility]    ALYSSA BY LAPAROSCOPY  08/29/2010    Performed by SHAYY JOHNS at SURGERY [facility]    LAMINOTOMY      OTHER ABDOMINAL SURGERY      TONSILLECTOMY      tonsillectomy     Family History   Problem Relation Age of Onset    Hypertension Mother     Sleep Apnea Mother     Heart  Disease Mother     Other Mother         hypothryod    Hypertension Maternal Uncle     Heart Disease Maternal Grandmother     Hypertension Maternal Grandmother     No Known Problems Sister     Other Sister         Narcolepsy;fibromyalsia;bone;nerve    Genitourinary () Problems Sister         endometriosis     Social History     Socioeconomic History    Marital status: Single     Spouse name: Not on file    Number of children: Not on file    Years of education: Not on file    Highest education level: Not on file   Occupational History    Not on file   Tobacco Use    Smoking status: Never    Smokeless tobacco: Never   Vaping Use    Vaping Use: Never used   Substance and Sexual Activity    Alcohol use: No     Alcohol/week: 0.0 oz    Drug use: Never     Frequency: 7.0 times per week    Sexual activity: Not Currently     Birth control/protection: Implant   Other Topics Concern    Not on file   Social History Narrative    ** Merged History Encounter **          Social Determinants of Health     Financial Resource Strain: Medium Risk (4/30/2021)    Overall Financial Resource Strain (CARDIA)     Difficulty of Paying Living Expenses: Somewhat hard   Food Insecurity: Food Insecurity Present (4/30/2021)    Hunger Vital Sign     Worried About Running Out of Food in the Last Year: Sometimes true     Ran Out of Food in the Last Year: Sometimes true   Transportation Needs: No Transportation Needs (2/13/2023)    PRAPARE - Transportation     Lack of Transportation (Medical): No     Lack of Transportation (Non-Medical): No   Physical Activity: Not on file   Stress: Not on file   Social Connections: Not on file   Intimate Partner Violence: Not on file   Housing Stability: Low Risk  (9/29/2020)    Housing Stability Vital Sign     Unable to Pay for Housing in the Last Year: No     Number of Places Lived in the Last Year: 1     Unstable Housing in the Last Year: No     Allergies   Allergen Reactions    Cefdinir Shortness of Breath and  "Itching     Tolerated 1/18/17  Tolerates ceftriaxone  Tolerated augmentin 8/2019     Depakote [Divalproex Sodium] Unspecified     Muscle spasms/muscle pain and weakness      Doxycycline Anaphylaxis and Vomiting     Other reaction(s): pustules/blisters  Other reaction(s): stomach pain    Montelukast [Singulair] Unspecified     Cardiac effusion    Vancomycin Itching     Pt becomes flushed in face and chest.   RXN=7/10/16    Wound Dressing Adhesive Rash     By pt report-\"removes skin totally off\"    Amitriptyline Unspecified     Headaches      Amoxicillin Rash          Aripiprazole Unspecified    Aripiprazole [Abilify] Unspecified     Headaches/muscle twitching      Clindamycin Nausea         Other reaction(s): nausea stomach pain    Depakote [Divalproex Sodium]     Erythromycin Rash     .  Other reaction(s): nausea stomach pain    Flomax [Tamsulosin Hydrochloride] Swelling    Hydromorphone      Other reaction(s): vomiting    Levaquin Unspecified     Severe muscle cramps in legs  RXN=unknown  Other reaction(s): leg muscle cramps    Metformin Unspecified     Increased lactic acid     Other reaction(s): itching and rash/nausea vomiting    Tamsulosin Swelling     Swelling of legs    Tape Rash     Tears skin off  coban with Tegaderm tape ok intermittently  RXN=ongoing    Ampicillin Rash     Pt reports that she received a rash     Ciprofloxacin Rash          Keflex Rash     Pt states she gets a rash with this medication  Tolerates ceftriaxone  Can take with Benadryl    Levofloxacin Unspecified     Leg muscle cramps    Metronidazole Rash     \"Vision problems\"  Other reaction(s): vision problems    Montelukast     Penicillins Hives     Can take with Benadryl    Sulfa Drugs Itching, Myalgia and Hives     Muscle pain and weakness  Other reaction(s): unknown    Valproic Acid Rash     .     Outpatient Encounter Medications as of 3/18/2024   Medication Sig Dispense Refill    sumatriptan (IMITREX) 20 MG/ACT nasal spray PLEASE SEE " ATTACHED FOR DETAILED DIRECTIONS      albuterol (ACCUNEB) 0.63 MG/3ML nebulizer solution Take 0.63 mg by nebulization every four hours as needed for Shortness of Breath.      Ipratropium-Albuterol (COMBIVENT INH) Inhale.      ivabradine (CORLANOR) 7.5 MG Tab tablet Take 1 Tablet by mouth 2 times a day with meals. 60 Tablet 11    ondansetron (ZOFRAN ODT) 4 MG TABLET DISPERSIBLE Dissolve 1 Tablet by mouth every 8 hours as needed for Nausea/Vomiting for up to 5 days. 15 Tablet 0    metoclopramide (REGLAN) 5 MG tablet Take 1 Tablet by mouth 4 times a day for 3 days. 12 Tablet 0    amLODIPine (NORVASC) 5 MG Tab Take 5 mg by mouth every day.      Rimegepant Sulfate (NURTEC) 75 MG TABLET DISPERSIBLE Take 75 mg by mouth 1 time a day as needed (migraine).      gabapentin (NEURONTIN) 400 MG Cap Take 1,200 mg by mouth 3 times a day. 3 capsules=1200mg      acetaminophen (TYLENOL) 500 MG Tab Take 1,000 mg by mouth 1 time a day as needed for Mild Pain.      BORIC ACID VAGINAL VA Insert 1 Suppository into the vagina 1 time a day as needed (yeast infection).      docusate sodium 250 MG Cap Take 250 mg by mouth 2 times a day.      sodium chloride (SALT) 1 GM Tab TAKE 1 TABLET BY MOUTH THREE TIMES A DAY WITH MEALS ( NOT COVERED/INS ) (Patient taking differently: Take 1 g by mouth 3 times a day.) 90 Tablet 2    omeprazole (PRILOSEC) 20 MG delayed-release capsule Take 20 mg by mouth 2 times a day.      lamoTRIgine (LAMICTAL) 25 MG Tab Take 50 mg by mouth 2 times a day. 50 mg = 2 tablets      Galcanezumab-gnlm (EMGALITY) 120 MG/ML Solution Auto-injector Inject 120 mg under the skin every 30 (thirty) days.      topiramate (TOPAMAX) 50 MG tablet Take 50 mg by mouth 2 times a day.      metoprolol SR (TOPROL XL) 25 MG TABLET SR 24 HR Take 1 Tablet by mouth every day. 90 Tablet 3    Adalimumab 80 MG/0.8ML Pen-injector Kit Inject 80 mg under the skin every 14 days.      tizanidine (ZANAFLEX) 4 MG Tab Take 4 mg by mouth 2 times a day.       "ziprasidone (GEODON) 40 MG Cap Take 1 capsule by mouth at bedtime. 30 Capsule 2    diphenhydrAMINE (BENADRYL) 25 MG Tab Take 25-50 mg by mouth 2 times a day. 25 mg in the morning, 50 mg in the evening      Melatonin 10 MG Tab Take 10 mg by mouth at bedtime.      etonogestrel (NEXPLANON) 68 MG Implant implant Inject 1 Each under the skin see administration instructions. Pt reports she is not sure when she had this medications injected last, pt reports she still has it in her arm  Every 3 years to change      [DISCONTINUED] ipratropium-albuterol (DUONEB) 0.5-2.5 (3) MG/3ML nebulizer solution Inhale 3 mL by inhalation route.      nystatin (MYCOSTATIN) 501922 UNIT/GM Cream topical cream Apply 1 Application topically 2 times a day. (Patient not taking: Reported on 3/18/2024)      [DISCONTINUED] SUMAtriptan Succinate (IMITREX) 6 MG/0.5ML Solution Inject 6 mg under the skin one time. Pt uses as needed nasal spray      [DISCONTINUED] CORLANOR 7.5 MG Tab tablet TAKE 1 TABLET BY MOUTH TWICE A DAY WITH FOOD 60 Tablet 0     No facility-administered encounter medications on file as of 3/18/2024.     Review of Systems   Respiratory:  Negative for cough and shortness of breath.    Cardiovascular:  Negative for chest pain and palpitations.   Musculoskeletal:  Negative for myalgias.   Neurological:  Negative for dizziness and loss of consciousness.              Objective     BP (!) 142/91 (BP Location: Left arm, Patient Position: Sitting, BP Cuff Size: Adult)   Pulse 67   Ht 1.626 m (5' 4\")   Wt 121 kg (266 lb)   SpO2 96%   BMI 45.66 kg/m²     Physical Exam  Constitutional:       Comments: Has extensive Body Braid device on   Eyes:      Conjunctiva/sclera: Conjunctivae normal.      Pupils: Pupils are equal, round, and reactive to light.   Neck:      Vascular: No JVD.   Cardiovascular:      Rate and Rhythm: Normal rate and regular rhythm.      Pulses: Normal pulses.      Heart sounds: Normal heart sounds.   Pulmonary:      " Effort: Pulmonary effort is normal. No accessory muscle usage or respiratory distress.      Breath sounds: Normal breath sounds. No wheezing or rales.   Musculoskeletal:      Right lower leg: No edema.      Left lower leg: No edema.   Skin:     General: Skin is warm and dry.      Findings: No rash.      Nails: There is no clubbing.   Neurological:      Mental Status: She is alert and oriented to person, place, and time.   Psychiatric:         Behavior: Behavior normal.               MPI 4/6/2017  Baseline ECG:NSR, diffuse T wave flattening throughout  Stress ECG: No ischemic T wave changes with peak stress  Impression: Normal stress ECG, nuclear images pending   No myocardial infarct or inducible ischemia.   Normal LEFT ventricular function.    ECHOCARDIOGRAM 3/1/2017  Left ventricular ejection fraction 60%.  No valvular disease.    ECHOCARDIOGRAM 12/26/2022  Normal left ventricular systolic function.  The left ventricular ejection fraction is visually estimated to be 55%.  Normal right ventricular size and systolic function.  Right heart pressures are normal.      CHEST CTA 2/17/2022  1.  No evidence of pulmonary embolism.  2.  Trace bilateral pleural effusions.  3.  Clear lungs without consolidation.  4.  Ascending aorta normal caliber no dissection.    Cardiac event recorder 8/31/2020  ZioPatch Summary:   1. Sinus rhythm with appropriate heart rate range. Average 73, range 52 to 117 bpm.   2. Normal sinus node and AV node conduction normal. No pauses.   3. Rare PACs.   4. Rare PVCs.   5. No sustained rhythm changes. No atrial fibrillation/flutter.   6. 5 patient triggers, symptoms reported include fluttering and racing, shaky during sinus, 73 to 90 bpm..   Conclusion: Normal monitor.  Sinus rhythm with rare PVCs and PACs.  Symptoms during sinus rhythm.   Lexy Solomon MD [facility]        Assessment & Plan     1. Postural orthostatic tachycardia syndrome  ivabradine (CORLANOR) 7.5 MG Tab tablet      2. Inappropriate  sinus tachycardia        3. Hypermobility syndrome            Medical Decision Making: Today's Assessment/Status/Plan:  Assessment  Inappropriate sinus tachycardia.  Ablation for IST, 2016.  Thyroid disorder, on chronic supplementation.  Sleep apnea on CPAP.  Morbid obesity.  Hypermobility syndrome, genetic testing not specific for EDS  Chest pain recurrent, noncardiac  Migraine headaches  Multiple other problems see above    Recommendation Discussion  Overall clinically stable from a cardiac standpoint regarding IST, POTS.  Remains on metoprolol, Corlanor, salt tablets.  BP elevated, decrease salt tablets or discontinue, on amlodipine per outside physician  RTC 6 months.

## 2024-03-21 ENCOUNTER — TELEPHONE (OUTPATIENT)
Dept: CARDIOLOGY | Facility: MEDICAL CENTER | Age: 34
End: 2024-03-21
Payer: MEDICARE

## 2024-03-21 NOTE — LETTER
PROCEDURE/SURGERY CLEARANCE FORM      Encounter Date: 3/21/2024    Patient: Kristin Balderrama  YOB: 1990    CARDIOLOGIST:  John Leon M.D.    REFERRING DOCTOR:  No ref. provider found    Dear Surgeon or Proceduralist,      Thank you for your request for cardiac stratification of our mutual patient Kristin Balderrama 1990. [facility]; and to the best of our understanding this patient has not had stenting, ablation, cardiothoracic surgery or hospitalization for cardiovascular reasons in the past 6 months.  Kristin Balderrama has been seen within the past 18 months and is considered to have non-modifiable cardiac risk for this low-risk procedure/surgery. They may proceed from a cardiovascular standpoint and may hold their antiplatelet/anticoagulation as briefly as possible. Please have patient resume this medication when hemodynamically stable to do so.     Aspirin or Prasugrel   - hold 7 days prior to procedure/surgery, resume when hemodynamically stable      Clopidrogrel or Ticagrelor  - hold 7 days for all neurological procedures, hold 5 days prior to all other procedure/surgery,  resume when hemodynamically stable     Warfarin - hold 7 days for all neurological procedures, hold 5 days prior to all other procedure/surgery and coordinate with [facility] ([phone]) INR testing and dose management.      Pradaxa/Xarelto/Eliquis/Savesya - hold 1 day prior to procedure for low bleeding risk procedure, 2 days for high bleeding risk procedure, or consider holding 3 days or longer for patients with reduced kidney function (CrCl <30mL/min) or spinal/cranial surgeries/procedures.      If they have a mechanical heart valve, please coordinate with [facility] ([phone]) the proper management of their anticoagulant in the periprocedural or perioperative period.      Some patients have higher risk for cardiovascular  complications or holding medication. If our patient has had prior complications of holding antiplatelet or anticoagulants in the past and we have seen them after these events, we have addressed these concerns with the patient. They are at an unknown degree of increased risk for recurrent complication.  You may hold anticoagulation/antiplatelets for the procedure or surgery if the benefits of the procedure or surgery outweigh this nonmodifiable risk.      If Kristin Balderrama 1990 has new symptoms of heart failure decompensation, unstable arrythmia, or angina please reach out and we will assess the patient.      If you have other patient-specific concerns, please feel free to reach out to the patient's cardiologist directly at [phone].     Thank you,       [facility]      John Leon M.D.  Electronically Signed

## 2024-03-21 NOTE — TELEPHONE ENCOUNTER
"Last OV: 3.18.2024  Proposed Surgery: GPOEM  Surgery Date: TBD  Requesting Office Name: [facility]  Fax Number: [phone]  Preference of Location (default is surgery center unless specified by Cardiologist or [facility])  Prior Clearance Addressed: No      Anticoags/Antiplatelets: Other None  Anticoags/Antiplatelet managed by Cardiology? YES  -None  Outstanding Cardiac Imaging : No  Stent, Cardiac Devices, or Catheterization: No  Ablation, TAVR/Valve (including open heart), Cardioversion: No  Recent Cardiac Hospitalization: No            When: Greater than 3 months since hospitalization   History (cardiac history):   Past Medical History:   Diagnosis Date    Abdominal pain     Anginal syndrome     random chest pain especially with tachycardia    Apnea, sleep     Arrhythmia     \"sinus tachycardia\", cariologist, Dr. Kumar; ablation 2/2016    Arthritis     osteo    ASTHMA     when around smoke    Back pain     Borderline personality disorder (HCC)     Breath shortness     with tachycardia    Bronchitis 02/08/2022    Last time was 12/21    Cardiac arrhythmia     Chickenpox     Chronic UTI 09/18/2010    Cough     Daytime sleepiness     Dental disorder 03/08/2021    infected tooth    Depression     Diabetes (HCC)     Diarrhea     incontinece    Disorder of thyroid     Hashimoto's    Fall     Fatigue     Frequent headaches     Gasping for breath     Gynecological disorder     PCOS    Headache(784.0)     Heart burn     Heart murmur     History of falling     Indigestion     Migraine     Mitochondrial disease (HCC)     Multiple personality disorder (HCC)     Nausea     Obesity     Other fatigue 06/29/2020    Pain 08/15/2012    back, 7/10    Painful joint     PCOS (polycystic ovarian syndrome)     Pneumonia 2012 12/2020    POTS (postural orthostatic tachycardia syndrome)     Psychosis (HCC)     Ringing in ears     Scoliosis     Shortness of breath     O2 as needed    Sinus tachycardia 10/31/2013    Sleep apnea  " "   CPAP \"pulmonary doctor took me off mid year 2016\"    Snoring     Supraventricular tachycardia 4/10/2019    Tonsillitis     Transverse myelitis (HCC)     2/8/22: Per pt: not anymore    Tuberculosis     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    Urinary bladder disorder     Suprapubic cath. 2/8/22: Not anymore.     Urinary incontinence     Weakness     Wears glasses              Surgical Clearance Letter Sent: YES   **Scan clearance request letter into [facility].**     "

## 2024-03-22 ENCOUNTER — HOSPITAL ENCOUNTER (OUTPATIENT)
Facility: MEDICAL CENTER | Age: 34
End: 2024-03-22
Attending: FAMILY MEDICINE
Payer: MEDICARE

## 2024-03-22 ENCOUNTER — OFFICE VISIT (OUTPATIENT)
Dept: URGENT CARE | Facility: CLINIC | Age: 34
End: 2024-03-22
Payer: MEDICARE

## 2024-03-22 ENCOUNTER — HOSPITAL ENCOUNTER (OUTPATIENT)
Dept: RADIOLOGY | Facility: MEDICAL CENTER | Age: 34
End: 2024-03-22
Attending: FAMILY MEDICINE
Payer: MEDICARE

## 2024-03-22 VITALS
OXYGEN SATURATION: 99 % | TEMPERATURE: 98.1 F | HEIGHT: 64 IN | SYSTOLIC BLOOD PRESSURE: 136 MMHG | DIASTOLIC BLOOD PRESSURE: 60 MMHG | RESPIRATION RATE: 18 BRPM | HEART RATE: 69 BPM | WEIGHT: 265.4 LBS | BODY MASS INDEX: 45.31 KG/M2

## 2024-03-22 DIAGNOSIS — N20.0 KIDNEY STONE: ICD-10-CM

## 2024-03-22 DIAGNOSIS — N30.01 ACUTE CYSTITIS WITH HEMATURIA: ICD-10-CM

## 2024-03-22 LAB
APPEARANCE UR: NORMAL
BILIRUB UR STRIP-MCNC: NEGATIVE MG/DL
COLOR UR AUTO: NORMAL
GLUCOSE UR STRIP.AUTO-MCNC: NEGATIVE MG/DL
KETONES UR STRIP.AUTO-MCNC: NEGATIVE MG/DL
LEUKOCYTE ESTERASE UR QL STRIP.AUTO: NORMAL
NITRITE UR QL STRIP.AUTO: NEGATIVE
PH UR STRIP.AUTO: 5.5 [PH] (ref 5–8)
POCT INT CON NEG: NEGATIVE
POCT INT CON POS: POSITIVE
POCT URINE PREGNANCY TEST: NEGATIVE
PROT UR QL STRIP: NEGATIVE MG/DL
RBC UR QL AUTO: NORMAL
SP GR UR STRIP.AUTO: 1.01
UROBILINOGEN UR STRIP-MCNC: 0.2 MG/DL

## 2024-03-22 PROCEDURE — 87077 CULTURE AEROBIC IDENTIFY: CPT

## 2024-03-22 PROCEDURE — 74176 CT ABD & PELVIS W/O CONTRAST: CPT

## 2024-03-22 PROCEDURE — 87186 SC STD MICRODIL/AGAR DIL: CPT

## 2024-03-22 PROCEDURE — 81025 URINE PREGNANCY TEST: CPT | Performed by: FAMILY MEDICINE

## 2024-03-22 PROCEDURE — 87086 URINE CULTURE/COLONY COUNT: CPT

## 2024-03-22 PROCEDURE — 3075F SYST BP GE 130 - 139MM HG: CPT | Performed by: FAMILY MEDICINE

## 2024-03-22 PROCEDURE — 3078F DIAST BP <80 MM HG: CPT | Performed by: FAMILY MEDICINE

## 2024-03-22 PROCEDURE — 81002 URINALYSIS NONAUTO W/O SCOPE: CPT | Performed by: FAMILY MEDICINE

## 2024-03-22 PROCEDURE — 99214 OFFICE O/P EST MOD 30 MIN: CPT | Performed by: FAMILY MEDICINE

## 2024-03-22 RX ORDER — NITROFURANTOIN 25; 75 MG/1; MG/1
100 CAPSULE ORAL 2 TIMES DAILY
Qty: 10 CAPSULE | Refills: 0 | Status: SHIPPED | OUTPATIENT
Start: 2024-03-22 | End: 2024-03-27

## 2024-03-22 RX ORDER — ONDANSETRON 4 MG/1
4 TABLET, ORALLY DISINTEGRATING ORAL EVERY 6 HOURS PRN
COMMUNITY
End: 2024-03-26

## 2024-03-22 RX ORDER — SUMATRIPTAN 25 MG/1
25-100 TABLET, FILM COATED ORAL
COMMUNITY
End: 2024-03-26

## 2024-03-22 RX ORDER — GALCANEZUMAB 120 MG/ML
INJECTION, SOLUTION SUBCUTANEOUS
COMMUNITY
End: 2024-03-26

## 2024-03-22 ASSESSMENT — FIBROSIS 4 INDEX: FIB4 SCORE: .6375

## 2024-03-22 NOTE — PROGRESS NOTES
Subjective:      CC:  presents with Dysuria            Dysuria   This is a new problem. The current episode started in the past 7 days. The problem occurs every urination. The problem has been unchanged. The quality of the pain is described as burning. There has been no fever. Pt is not sexually active. There is no history of pyelonephritis. Associated symptoms include frequency and urgency. Pertinent negatives include no chills, discharge, flank pain, nausea or vomiting. Pt has tried nothing for the symptoms. There is no history of recurrent UTIs.     Social History     Socioeconomic History    Marital status: Single     Spouse name: Not on file    Number of children: Not on file    Years of education: Not on file    Highest education level: Not on file   Occupational History    Not on file   Tobacco Use    Smoking status: Never    Smokeless tobacco: Never   Vaping Use    Vaping Use: Never used   Substance and Sexual Activity    Alcohol use: No     Alcohol/week: 0.0 oz    Drug use: Never     Frequency: 7.0 times per week    Sexual activity: Not Currently     Birth control/protection: Implant   Other Topics Concern    Not on file   Social History Narrative    ** Merged History Encounter **          Social Determinants of Health     Financial Resource Strain: Medium Risk (4/30/2021)    Overall Financial Resource Strain (CARDIA)     Difficulty of Paying Living Expenses: Somewhat hard   Food Insecurity: Food Insecurity Present (4/30/2021)    Hunger Vital Sign     Worried About Running Out of Food in the Last Year: Sometimes true     Ran Out of Food in the Last Year: Sometimes true   Transportation Needs: No Transportation Needs (2/13/2023)    PRAPARE - Transportation     Lack of Transportation (Medical): No     Lack of Transportation (Non-Medical): No   Physical Activity: Not on file   Stress: Not on file   Social Connections: Not on file   Intimate Partner Violence: Not on file   Housing Stability: Low Risk   "(9/29/2020)    Housing Stability Vital Sign     Unable to Pay for Housing in the Last Year: No     Number of Places Lived in the Last Year: 1     Unstable Housing in the Last Year: No         Family History   Problem Relation Age of Onset    Hypertension Mother     Sleep Apnea Mother     Heart Disease Mother     Other Mother         hypothryod    Hypertension Maternal Uncle     Heart Disease Maternal Grandmother     Hypertension Maternal Grandmother     No Known Problems Sister     Other Sister         Narcolepsy;fibromyalsia;bone;nerve    Genitourinary () Problems Sister         endometriosis         Allergies   Allergen Reactions    Cefdinir Shortness of Breath and Itching     Tolerated 1/18/17  Tolerates ceftriaxone  Tolerated augmentin 8/2019     Depakote [Divalproex Sodium] Unspecified     Muscle spasms/muscle pain and weakness      Doxycycline Anaphylaxis and Vomiting     Other reaction(s): pustules/blisters  Other reaction(s): stomach pain    Montelukast [Singulair] Unspecified     Cardiac effusion    Vancomycin Itching     Pt becomes flushed in face and chest.   RXN=7/10/16    Wound Dressing Adhesive Rash     By pt report-\"removes skin totally off\"    Amitriptyline Unspecified     Headaches      Amoxicillin Rash          Aripiprazole Unspecified    Aripiprazole [Abilify] Unspecified     Headaches/muscle twitching      Clindamycin Nausea         Other reaction(s): nausea stomach pain    Depakote [Divalproex Sodium]     Erythromycin Rash     .  Other reaction(s): nausea stomach pain    Flomax [Tamsulosin Hydrochloride] Swelling    Hydromorphone      Other reaction(s): vomiting    Levaquin Unspecified     Severe muscle cramps in legs  RXN=unknown  Other reaction(s): leg muscle cramps    Metformin Unspecified     Increased lactic acid     Other reaction(s): itching and rash/nausea vomiting    Tamsulosin Swelling     Swelling of legs    Tape Rash     Tears skin off  coban with Tegaderm tape ok " "intermittently  RXN=ongoing    Ampicillin Rash     Pt reports that she received a rash     Ciprofloxacin Rash          Keflex Rash     Pt states she gets a rash with this medication  Tolerates ceftriaxone  Can take with Benadryl    Levofloxacin Unspecified     Leg muscle cramps    Metronidazole Rash     \"Vision problems\"  Other reaction(s): vision problems    Montelukast     Penicillins Hives     Can take with Benadryl    Sulfa Drugs Itching, Myalgia and Hives     Muscle pain and weakness  Other reaction(s): unknown    Valproic Acid Rash     .           Current Outpatient Medications on File Prior to Visit   Medication Sig Dispense Refill    adalimumab (HUMIRA, 2 PEN,) 80 MG/0.8ML injection Inject 80 mg under the skin one time.      Galcanezumab-gnlm (EMGALITY) 120 MG/ML Solution Auto-injector Inject  under the skin.      ipratropium-albuterol (COMBIVENT RESPIMAT)  MCG/ACT Aero Soln Inhale 1 Puff 4 times a day.      ondansetron (ZOFRAN ODT) 4 MG TABLET DISPERSIBLE Take 4 mg by mouth every 6 hours as needed for Nausea/Vomiting.      SUMAtriptan (IMITREX) 25 MG Tab tablet Take  mg by mouth one time as needed for Migraine.      albuterol (ACCUNEB) 0.63 MG/3ML nebulizer solution Take 0.63 mg by nebulization every four hours as needed for Shortness of Breath.      ivabradine (CORLANOR) 7.5 MG Tab tablet Take 1 Tablet by mouth 2 times a day with meals. 60 Tablet 11    amLODIPine (NORVASC) 5 MG Tab Take 5 mg by mouth every day.      Rimegepant Sulfate (NURTEC) 75 MG TABLET DISPERSIBLE Take 75 mg by mouth 1 time a day as needed (migraine).      gabapentin (NEURONTIN) 400 MG Cap Take 1,200 mg by mouth 3 times a day. 3 capsules=1200mg      acetaminophen (TYLENOL) 500 MG Tab Take 1,000 mg by mouth 1 time a day as needed for Mild Pain.      sodium chloride (SALT) 1 GM Tab TAKE 1 TABLET BY MOUTH THREE TIMES A DAY WITH MEALS ( NOT COVERED/INS ) (Patient taking differently: Take 1 g by mouth 3 times a day.) 90 Tablet 2 "    omeprazole (PRILOSEC) 20 MG delayed-release capsule Take 20 mg by mouth 2 times a day.      lamoTRIgine (LAMICTAL) 25 MG Tab Take 50 mg by mouth 2 times a day. 50 mg = 2 tablets      topiramate (TOPAMAX) 50 MG tablet Take 50 mg by mouth 2 times a day.      metoprolol SR (TOPROL XL) 25 MG TABLET SR 24 HR Take 1 Tablet by mouth every day. 90 Tablet 3    tizanidine (ZANAFLEX) 4 MG Tab Take 4 mg by mouth 2 times a day.      ziprasidone (GEODON) 40 MG Cap Take 1 capsule by mouth at bedtime. 30 Capsule 2    diphenhydrAMINE (BENADRYL) 25 MG Tab Take 25-50 mg by mouth 2 times a day. 25 mg in the morning, 50 mg in the evening      Melatonin 10 MG Tab Take 10 mg by mouth at bedtime.      sumatriptan (IMITREX) 20 MG/ACT nasal spray PLEASE SEE ATTACHED FOR DETAILED DIRECTIONS (Patient not taking: Reported on 3/22/2024)      Ipratropium-Albuterol (COMBIVENT INH) Inhale. (Patient not taking: Reported on 3/22/2024)      nystatin (MYCOSTATIN) 645749 UNIT/GM Cream topical cream Apply 1 Application topically 2 times a day. (Patient not taking: Reported on 3/18/2024)      BORIC ACID VAGINAL VA Insert 1 Suppository into the vagina 1 time a day as needed (yeast infection). (Patient not taking: Reported on 3/22/2024)      docusate sodium 250 MG Cap Take 250 mg by mouth 2 times a day. (Patient not taking: Reported on 3/22/2024)      Galcanezumab-gnlm (EMGALITY) 120 MG/ML Solution Auto-injector Inject 120 mg under the skin every 30 (thirty) days. (Patient not taking: Reported on 3/22/2024)      Adalimumab 80 MG/0.8ML Pen-injector Kit Inject 80 mg under the skin every 14 days. (Patient not taking: Reported on 3/22/2024)      etonogestrel (NEXPLANON) 68 MG Implant implant Inject 1 Each under the skin see administration instructions. Pt reports she is not sure when she had this medications injected last, pt reports she still has it in her arm  Every 3 years to change (Patient not taking: Reported on 3/22/2024)       No current  "facility-administered medications on file prior to visit.       Review of Systems   Constitutional: Negative for chills.   Respiratory: Negative for shortness of breath.    Cardiovascular: Negative for chest pain.   Gastrointestinal: Negative for nausea, vomiting and abdominal pain.   Genitourinary: Positive for dysuria, urgency and frequency. Negative for flank pain.   Skin: Negative for rash.   Neurological: Negative for dizziness and headaches.   All other systems reviewed and are negative.         Objective:      /60 (BP Location: Left arm, Patient Position: Sitting, BP Cuff Size: Adult)   Pulse 69   Temp 36.7 °C (98.1 °F) (Temporal)   Resp 18   Ht 1.626 m (5' 4\")   Wt 120 kg (265 lb 6.4 oz)   SpO2 99%       Physical Exam   Constitutional: pt is oriented to person, place, and time. Pt appears well-developed and well-nourished. No distress.   HENT:   Head: Normocephalic and atraumatic.   Mouth/Throat: Mucous membranes are normal.   Eyes: Conjunctivae and EOM are normal. Pupils are equal, round, and reactive to light. Right eye exhibits no discharge. Left eye exhibits no discharge. No scleral icterus.   Neck: Normal range of motion. Neck supple.   Cardiovascular: Normal rate, regular rhythm, normal heart sounds and intact distal pulses.    No murmur heard.  Pulmonary/Chest: Effort normal and breath sounds normal. No respiratory distress. Pt has no wheezes,  rales.   Abdominal: Bowel sounds are normal. Pt exhibits no distension and no mass. There is no tenderness. There is no rebound, no guarding and no CVA tenderness.   Neurological: pt is alert and oriented to person, place, and time.   Skin: Skin is warm and dry.   Psychiatric: behavior is normal.   Nursing note and vitals reviewed.         Details    Reading Physician Reading Date Result Priority   Jose Miguel Fritz M.D.  [phone] 3/22/2024      Narrative & Impression     3/22/2024 3:41 PM     HISTORY/REASON FOR EXAM:  flank pain        TECHNIQUE/EXAM " DESCRIPTION AND NUMBER OF VIEWS:  CT scan renal/colic without contrast.     5 mm helical images of the abdomen and pelvis were obtained from the diaphragmatic domes through the pubic symphysis.     Low dose optimization technique was utilized for this CT exam including automated exposure control and adjustment of the mA and/or kV according to patient size.     COMPARISON: 2/18/2024 and 3/14/2024     FINDINGS:  Renal stone: Punctate nonobstructing right renal stones. No hydronephrosis or ureteral stones.     Lung Bases:     No pulmonary nodules at the lung bases. No pleural or pericardial fluid.     Abdomen:     Within the limits of noncontrast technique:     Liver: Normal.     Spleen: Mild splenomegaly measuring 13.8 cm AP diameter..     Pancreas: Unremarkable.     Gallbladder: Surgically absent.     Biliary: No biliary dilatation..     Adrenal glands: Nonenlarged.     Bowel: No evidence of bowel obstruction or acute appendicitis.     Lymph nodes: No adenopathy.     Vasculature: The abdominal aorta is normal in caliber     Peritoneum: Unremarkable without ascites.     Musculoskeletal: No aggressive osseous lesion.  L4-5 fusion.     Pelvis:     No obvious abnormality seen in the pelvic structures but evaluation limited on CT.  Pelvis is partially obscured by beam hardening artifact from left hip screws.     No lymph node enlargement.     No free fluid, or free air in the abdomen or pelvis.     No aggressive bone lesions are seen.     IMPRESSION:     1.  Punctate nonobstructing right renal stones. No hydronephrosis and no ureteral stones.     2.  No evidence of bowel obstruction or acute appendicitis. No free fluid.     3.  Mild splenomegaly.           Exam Ended: 03/22/24  3:54 PM Last Resulted: 03/22/24  4:21 PM             Lab Results   Component Value Date/Time    POCCOLOR yellow 02/03/2024 09:47 AM    POCCOLOR Yellow 01/22/2017 06:24 AM    POCAPPEAR clear 02/03/2024 09:47 AM    POCAPPEAR Cloudy (A) 01/22/2017  06:24 AM    POCLEUKEST negative 02/03/2024 09:47 AM    POCLEUKEST Trace (A) 01/22/2017 06:24 AM    POCNITRITE negative 02/03/2024 09:47 AM    POCNITRITE Negative 01/22/2017 06:24 AM    POCUROBILIGE 0.2 E.U dL 02/03/2024 09:47 AM    POCPROTEIN negative 02/03/2024 09:47 AM    POCPROTEIN Negative 01/22/2017 06:24 AM    POCURPH 5.5 02/03/2024 09:47 AM    POCURPH 6.0 01/22/2017 06:24 AM    POCBLOOD negative 02/03/2024 09:47 AM    POCBLOOD Moderate (A) 01/22/2017 06:24 AM    POCSPGRV 1.030 02/03/2024 09:47 AM    POCSPGRV >=1.030 (A) 01/22/2017 06:24 AM    POCKETONES negative 02/03/2024 09:47 AM    POCKETONES Negative 01/22/2017 06:24 AM    POCBILIRUBIN negative 02/03/2024 09:47 AM    POCGLUCUA negative 02/03/2024 09:47 AM    POCGLUCUA 250 (A) 01/22/2017 06:24 AM            Assessment/Plan:      1. Acute cystitis with hematuria   UA findings c/w infection      - POCT Urinalysis  - URINE CULTURE(NEW); Future  - POCT Pregnancy  - nitrofurantoin (MACROBID) 100 MG Cap; Take 1 Capsule by mouth 2 times a day for 5 days.  Dispense: 10 Capsule; Refill: 0      #2.  Kidney stone  CT shows nonobstructing rt kidney stone without hydronephrosis.       Urgent referral to urology   - CT-RENAL COLIC EVALUATION(A/P W/O); Future      Differential diagnosis, natural history, supportive care, and indications for immediate follow-up discussed. All questions answered. Patient agrees with the plan of care.     Follow-up as needed if symptoms worsen or fail to improve to PCP, Urgent care or Emergency Room.     I have personally reviewed prior external notes and test results pertinent to today's visit.  I have independently reviewed and interpreted all diagnostics ordered during this urgent care acute visit.

## 2024-03-23 DIAGNOSIS — N20.0 KIDNEY STONE: ICD-10-CM

## 2024-03-25 ENCOUNTER — OFFICE VISIT (OUTPATIENT)
Dept: URGENT CARE | Facility: CLINIC | Age: 34
End: 2024-03-25
Payer: MEDICARE

## 2024-03-25 VITALS
OXYGEN SATURATION: 99 % | BODY MASS INDEX: 44.46 KG/M2 | HEIGHT: 64 IN | SYSTOLIC BLOOD PRESSURE: 132 MMHG | DIASTOLIC BLOOD PRESSURE: 76 MMHG | WEIGHT: 260.4 LBS | TEMPERATURE: 99.1 F | RESPIRATION RATE: 20 BRPM | HEART RATE: 71 BPM

## 2024-03-25 DIAGNOSIS — N30.01 ACUTE CYSTITIS WITH HEMATURIA: ICD-10-CM

## 2024-03-25 DIAGNOSIS — R30.0 DYSURIA: ICD-10-CM

## 2024-03-25 LAB
APPEARANCE UR: NORMAL
BACTERIA UR CULT: ABNORMAL
BACTERIA UR CULT: ABNORMAL
BILIRUB UR STRIP-MCNC: NEGATIVE MG/DL
COLOR UR AUTO: NORMAL
GLUCOSE UR STRIP.AUTO-MCNC: NEGATIVE MG/DL
KETONES UR STRIP.AUTO-MCNC: NEGATIVE MG/DL
LEUKOCYTE ESTERASE UR QL STRIP.AUTO: NEGATIVE
NITRITE UR QL STRIP.AUTO: NEGATIVE
PH UR STRIP.AUTO: 7 [PH] (ref 5–8)
POCT INT CON NEG: NEGATIVE
POCT INT CON POS: NEGATIVE
POCT URINE PREGNANCY TEST: NEGATIVE
PROT UR QL STRIP: NEGATIVE MG/DL
RBC UR QL AUTO: NEGATIVE
SIGNIFICANT IND 70042: ABNORMAL
SITE SITE: ABNORMAL
SOURCE SOURCE: ABNORMAL
SP GR UR STRIP.AUTO: 1.02
UROBILINOGEN UR STRIP-MCNC: NORMAL MG/DL

## 2024-03-25 PROCEDURE — 81025 URINE PREGNANCY TEST: CPT | Performed by: PHYSICIAN ASSISTANT

## 2024-03-25 PROCEDURE — 99214 OFFICE O/P EST MOD 30 MIN: CPT | Performed by: PHYSICIAN ASSISTANT

## 2024-03-25 PROCEDURE — 81002 URINALYSIS NONAUTO W/O SCOPE: CPT | Performed by: PHYSICIAN ASSISTANT

## 2024-03-25 PROCEDURE — 3078F DIAST BP <80 MM HG: CPT | Performed by: PHYSICIAN ASSISTANT

## 2024-03-25 PROCEDURE — 3075F SYST BP GE 130 - 139MM HG: CPT | Performed by: PHYSICIAN ASSISTANT

## 2024-03-25 RX ORDER — SULFAMETHOXAZOLE AND TRIMETHOPRIM 800; 160 MG/1; MG/1
1 TABLET ORAL 2 TIMES DAILY
Qty: 10 TABLET | Refills: 0 | Status: SHIPPED | OUTPATIENT
Start: 2024-03-25 | End: 2024-03-30

## 2024-03-25 ASSESSMENT — ENCOUNTER SYMPTOMS
NAUSEA: 0
FLANK PAIN: 1
VOMITING: 0
CHILLS: 1
FEVER: 1

## 2024-03-25 ASSESSMENT — FIBROSIS 4 INDEX: FIB4 SCORE: .6375

## 2024-03-25 NOTE — PROGRESS NOTES
Subjective:   Kristin Balderrama is a 33 y.o. female who presents for UTI (Came in 03/22 symptoms worsened/ cold sweats/ foul odor/kidney pain has worsened)        Patient presents with concerns of persistent dysuria, intermittent right flank pain as well as tactile fevers and chills.  Urinary symptoms have been present for the last week.  She was previously evaluated in urgent care for this and started on Macrobid.  Despite this she feels that her symptoms have not improved.  States that her urine is foul-smelling as well.  Patient suffers from recurrent UTIs and has appointment with urology this afternoon.  No nausea or vomiting.        Review of Systems   Constitutional:  Positive for chills and fever.   Gastrointestinal:  Negative for nausea and vomiting.   Genitourinary:  Positive for dysuria, flank pain, frequency and urgency. Negative for hematuria.       PMH:  has a past medical history of Abdominal pain, Anginal syndrome, Apnea, sleep, Arrhythmia, Arthritis, ASTHMA, Back pain, Borderline personality disorder ([facility]), Breath shortness, Bronchitis (02/08/2022), Cardiac arrhythmia, Chickenpox, Chronic UTI (09/18/2010), Cough, Daytime sleepiness, Dental disorder (03/08/2021), Depression, Diabetes ([facility]), Diarrhea, Disorder of thyroid, Fall, Fatigue, Frequent headaches, Gasping for breath, Gynecological disorder, Headache(784.0), Heart burn, Heart murmur, History of falling, Indigestion, Migraine, Mitochondrial disease ([facility]), 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  MEDS:   Current Outpatient Medications:     sulfamethoxazole-trimethoprim (BACTRIM DS) 800-160 MG tablet, Take 1 Tablet by mouth 2 times a day for 5 days., Disp: 10 Tablet, Rfl: 0    adalimumab (HUMIRA, 2 PEN,) 80 MG/0.8ML injection, Inject 80 mg under the skin one time., Disp: , Rfl:     Galcanezumab-gnlm (EMGALITY) 120 MG/ML Solution Auto-injector, Inject  under the skin., Disp: , Rfl:     ipratropium-albuterol (COMBIVENT RESPIMAT)  MCG/ACT Aero Soln, Inhale 1 Puff 4 times a day., Disp: , Rfl:     ondansetron (ZOFRAN ODT) 4 MG TABLET DISPERSIBLE, Take 4 mg by mouth every 6 hours as needed for Nausea/Vomiting., Disp: , Rfl:     SUMAtriptan (IMITREX) 25 MG Tab tablet, Take  mg by mouth one time as needed for Migraine., Disp: , Rfl:     nitrofurantoin (MACROBID) 100 MG Cap, Take 1 Capsule by mouth 2 times a day for 5 days., Disp: 10 Capsule, Rfl: 0    sumatriptan (IMITREX) 20 MG/ACT nasal spray, , Disp: , Rfl:     albuterol (ACCUNEB) 0.63 MG/3ML nebulizer solution, Take 0.63 mg by nebulization every four hours as needed for Shortness of Breath., Disp: , Rfl:     Ipratropium-Albuterol (COMBIVENT INH), Inhale., Disp: , Rfl:     ivabradine (CORLANOR) 7.5 MG Tab tablet, Take 1 Tablet by mouth 2 times a day with meals., Disp: 60 Tablet, Rfl: 11    amLODIPine (NORVASC) 5 MG Tab, Take 5 mg by mouth every day., Disp: , Rfl:     Rimegepant Sulfate (NURTEC) 75 MG TABLET DISPERSIBLE, Take 75 mg by mouth 1 time a day as needed (migraine)., Disp: , Rfl:     gabapentin (NEURONTIN) 400 MG Cap, Take 1,200 mg by mouth 3 times a day. 3 capsules=1200mg, Disp: , Rfl:     acetaminophen (TYLENOL) 500 MG Tab, Take 1,000 mg by mouth 1 time a day as needed for Mild Pain., Disp: , Rfl:     nystatin (MYCOSTATIN) 737950 UNIT/GM Cream topical cream, Apply 1 Application topically 2 times a day., Disp: , Rfl:     BORIC ACID VAGINAL VA, Insert 1 Suppository into the vagina 1 time a day as needed (yeast  infection)., Disp: , Rfl:     docusate sodium 250 MG Cap, Take 250 mg by mouth 2 times a day., Disp: , Rfl:     sodium chloride (SALT) 1 GM Tab, TAKE 1 TABLET BY MOUTH THREE TIMES A DAY WITH MEALS ( NOT COVERED/INS ) (Patient taking differently: Take 1 g by mouth 3 times a day.), Disp: 90 Tablet, Rfl: 2    omeprazole (PRILOSEC) 20 MG delayed-release capsule, Take 20 mg by mouth 2 times a day., Disp: , Rfl:     lamoTRIgine (LAMICTAL) 25 MG Tab, Take 50 mg by mouth 2 times a day. 50 mg = 2 tablets, Disp: , Rfl:     Galcanezumab-gnlm (EMGALITY) 120 MG/ML Solution Auto-injector, Inject 120 mg under the skin every 30 (thirty) days., Disp: , Rfl:     topiramate (TOPAMAX) 50 MG tablet, Take 50 mg by mouth 2 times a day., Disp: , Rfl:     metoprolol SR (TOPROL XL) 25 MG TABLET SR 24 HR, Take 1 Tablet by mouth every day., Disp: 90 Tablet, Rfl: 3    Adalimumab 80 MG/0.8ML Pen-injector Kit, Inject 80 mg under the skin every 14 days., Disp: , Rfl:     tizanidine (ZANAFLEX) 4 MG Tab, Take 4 mg by mouth 2 times a day., Disp: , Rfl:     ziprasidone (GEODON) 40 MG Cap, Take 1 capsule by mouth at bedtime., Disp: 30 Capsule, Rfl: 2    diphenhydrAMINE (BENADRYL) 25 MG Tab, Take 25-50 mg by mouth 2 times a day. 25 mg in the morning, 50 mg in the evening, Disp: , Rfl:     Melatonin 10 MG Tab, Take 10 mg by mouth at bedtime., Disp: , Rfl:     etonogestrel (NEXPLANON) 68 MG Implant implant, Inject 68 mg under the skin see administration instructions. Pt reports she is not sure when she had this medications injected last, pt reports she still has it in her arm Every 3 years to change, Disp: , Rfl:   ALLERGIES:   Allergies   Allergen Reactions    Cefdinir Shortness of Breath and Itching     Tolerated 1/18/17  Tolerates ceftriaxone  Tolerated augmentin 8/2019     Depakote [Divalproex Sodium] Unspecified     Muscle spasms/muscle pain and weakness      Doxycycline Anaphylaxis and Vomiting     Other reaction(s): pustules/blisters  Other  "reaction(s): stomach pain    Montelukast [Singulair] Unspecified     Cardiac effusion    Vancomycin Itching     Pt becomes flushed in face and chest.   RXN=7/10/16    Wound Dressing Adhesive Rash     By pt report-\"removes skin totally off\"    Amitriptyline Unspecified     Headaches      Amoxicillin Rash          Aripiprazole Unspecified    Aripiprazole [Abilify] Unspecified     Headaches/muscle twitching      Clindamycin Nausea         Other reaction(s): nausea stomach pain    Depakote [Divalproex Sodium]     Erythromycin Rash     .  Other reaction(s): nausea stomach pain    Flomax [Tamsulosin Hydrochloride] Swelling    Hydromorphone      Other reaction(s): vomiting    Levaquin Unspecified     Severe muscle cramps in legs  RXN=unknown  Other reaction(s): leg muscle cramps    Metformin Unspecified     Increased lactic acid     Other reaction(s): itching and rash/nausea vomiting    Tamsulosin Swelling     Swelling of legs    Tape Rash     Tears skin off  coban with Tegaderm tape ok intermittently  RXN=ongoing    Ampicillin Rash     Pt reports that she received a rash     Ciprofloxacin Rash          Keflex Rash     Pt states she gets a rash with this medication  Tolerates ceftriaxone  Can take with Benadryl    Levofloxacin Unspecified     Leg muscle cramps    Metronidazole Rash     \"Vision problems\"  Other reaction(s): vision problems    Montelukast     Penicillins Hives     Can take with Benadryl    Sulfa Drugs Itching, Myalgia and Hives     Muscle pain and weakness  Other reaction(s): unknown    Valproic Acid Rash     .     SURGHX:   Past Surgical History:   Procedure Laterality Date    CO CYSTOSCOPY,INSERT URETERAL STENT Right 2/12/2024    Procedure: CYSTOSCOPY, WITH RIGHT URETEROSCOPY, WITH LITHOTRIPSY, WITH INSERTION OF RIGHT URETERAL STENT;  Surgeon: Josafat Roberson M.D.;  Location: SURGERY [facility];  Service: Urology    CO CYSTO/URETERO/PYELOSCOPY, DX Right 2/12/2024    Procedure: URETEROSCOPY;  Surgeon: " Josafat Roberson M.D.;  Location: [facility];  Service: Urology    LASERTRIPSY Right 2/12/2024    Procedure: LITHOTRIPSY, USING LASER;  Surgeon: Josafat Roberson M.D.;  Location: [facility];  Service: Urology    INGUINAL HERNIA LAPAROSCOPIC Right 07/21/2023    Procedure: LAPAROSCOPIC RIGHT INGUINAL HERNIA REPAIR WITH MESH;  Surgeon: Joe Noyola M.D.;  Location: [facility];  Service: General    HERNIA REPAIR Right 07/21/2023    PB PERCUT FIX PBOX/NECK FEMUR FX Left 01/28/2023    Procedure: FIXATION, HIP, USING CANNULATED SCREW;  Surgeon: Noman Abdul M.D.;  Location: [facility];  Service: Orthopedics    MO LAP,DIAGNOSTIC ABDOMEN  02/14/2022    Procedure: LAPAROSCOPY;  Surgeon: Seamus Pisano M.D.;  Location: SURGERY SAME DAY [facility];  Service: Gynecology    OVARIAN CYSTECTOMY Right 02/14/2022    Procedure: EXCISION, CYST, OVARY;  Surgeon: Seamus Pisano M.D.;  Location: SURGERY SAME DAY [facility];  Service: Gynecology    BOWEL STIMULATOR INSERTION  03/10/2021    Procedure: INSERTION, ELECTRODE LEADS AND PULSE GENERATOR, NEUROSTIMULATOR, SACRAL - REMOVAL OF INTERSTIM WITH REPLACEMENT OF SACRAL NEUROMODULATION DEVICE;  Surgeon: Joe Noyola M.D.;  Location: [facility];  Service: General    MUSCLE BIOPSY Right 01/26/2017    Procedure: MUSCLE BIOPSY - THIGH;  Surgeon: Isidro Vigil M.D.;  Location: [facility];  Service:     GASTROSCOPY WITH BALLOON DILATATION N/A 01/04/2017    Procedure: GASTROSCOPY WITH DILATATION;  Surgeon: Torres Vargas M.D.;  Location: [facility];  Service:     BOWEL STIMULATOR INSERTION  07/13/2016    Procedure: BOWEL STIMULATOR INSERTION FOR PERMANENT INTERSTIM SACRAL IMPLANT;  Surgeon: Joe Noyola M.D.;  Location: [facility];  Service:     RECOVERY  01/27/2016    Procedure: CATH LAB EP STUDY WITH SINUS NODE MODIFICATION LA;  Surgeon: [facility];  Location: SURGERY PRE-POST PROC UNIT  "[facility];  Service:     OTHER CARDIAC SURGERY  01/2016    cardiac ablation    NEURO DEST FACET L/S W/IG SNGL  04/21/2015    Performed by Reza Tabor at SURGERY [facility]    LUMBAR FUSION ANTERIOR  08/21/2012    Performed by SUSIE SAWANT at SURGERY [facility]    ALYSSA BY LAPAROSCOPY  08/29/2010    Performed by SHAYY JOHNS at SURGERY [facility]    LAMINOTOMY      OTHER ABDOMINAL SURGERY      TONSILLECTOMY      tonsillectomy     SOCHX:  reports that she has never smoked. She has never used smokeless tobacco. She reports that she does not drink alcohol and does not use drugs.  FH: Family history was reviewed, no pertinent findings to report   Objective:   /76 (BP Location: Left arm, Patient Position: Sitting, BP Cuff Size: Large adult)   Pulse 71   Temp 37.3 °C (99.1 °F) (Temporal)   Resp 20   Ht 1.626 m (5' 4\")   Wt 118 kg (260 lb 6.4 oz)   SpO2 99%   BMI 44.70 kg/m²   Physical Exam  Vitals reviewed.   Constitutional:       General: She is not in acute distress.     Appearance: Normal appearance. She is well-developed. She is not toxic-appearing.   HENT:      Head: Normocephalic and atraumatic.      Right Ear: External ear normal.      Left Ear: External ear normal.      Nose: Nose normal.   Cardiovascular:      Rate and Rhythm: Normal rate and regular rhythm.      Heart sounds: Normal heart sounds, S1 normal and S2 normal.   Pulmonary:      Effort: Pulmonary effort is normal. No respiratory distress.      Breath sounds: Normal breath sounds. No stridor. No decreased breath sounds, wheezing, rhonchi or rales.   Abdominal:      Tenderness: There is no abdominal tenderness. There is no right CVA tenderness, left CVA tenderness, guarding or rebound.   Skin:     General: Skin is dry.   Neurological:      Comments: Alert and oriented.    Psychiatric:         Speech: Speech normal.         Behavior: Behavior normal.           Results for orders placed or performed in visit on 03/25/24 "   POCT Urinalysis   Result Value Ref Range    POC Color Dark Yellow Negative    POC Appearance Slightly Cloudy Negative    POC Glucose Negative Negative mg/dL    POC Bilirubin Negative Negative mg/dL    POC Ketones Negative Negative mg/dL    POC Specific Gravity 1.025 <1.005 - >1.030    POC Blood Negative Negative    POC Urine PH 7.0 5.0 - 8.0    POC Protein Negative Negative mg/dL    POC Urobiligen 0.2 E.U./dl Negative (0.2) mg/dL    POC Nitrites Negative Negative    POC Leukocyte Esterase Negative Negative   POCT Pregnancy   Result Value Ref Range    POC Urine Pregnancy Test Negative     Internal Control Positive Negative     Internal Control Negative Negative      *Note: Due to a large number of results and/or encounters for the requested time period, some results have not been displayed. A complete set of results can be found in Results Review.       Assessment/Plan:   1. Acute cystitis with hematuria  - sulfamethoxazole-trimethoprim (BACTRIM DS) 800-160 MG tablet; Take 1 Tablet by mouth 2 times a day for 5 days.  Dispense: 10 Tablet; Refill: 0    2. Dysuria  - POCT Urinalysis  - POCT Pregnancy    Records from 3/22/2024 reviewed for continuity of care.  Patient was seen in clinic and treated for acute cystitis with Macrobid.  Urine culture was positive for Citrobacter with good sensitivity to Macrobid.  CT renal colic demonstrated punctate nonobstructive renal stones but no evidence of hydronephrosis.    Today patient presents with concerns of worsening symptoms.  She is nontoxic-appearing and her vital signs are stable.  UA is within normal limits today.  Discussed switching patient to Bactrim.  Sulfa drugs are listed on her antibiotic list but patient denies prior reaction to this medication.  Through shared decision making patient did decide to start Bactrim and start Macrobid.  She will take Benadryl concurrently.  Patient has follow-up appointment with urology this afternoon and will follow-up as scheduled.   We also reviewed red flag signs and symptoms and patient was given strict ED precautions.

## 2024-03-25 NOTE — DIETARY
NUTRITION SERVICES: BMI - Pt with BMI >40 (=Body mass index is 40.17 kg/m².), Class III obesity. Weight loss counseling not appropriate in acute care setting. RECOMMEND - If appropriate at DC please refer to outpatient nutrition services for weight management.     
0 = swallows foods/liquids without difficulty

## 2024-03-26 ENCOUNTER — HOSPITAL ENCOUNTER (EMERGENCY)
Facility: MEDICAL CENTER | Age: 34
End: 2024-03-26
Attending: EMERGENCY MEDICINE
Payer: MEDICARE

## 2024-03-26 ENCOUNTER — APPOINTMENT (OUTPATIENT)
Dept: RADIOLOGY | Facility: MEDICAL CENTER | Age: 34
End: 2024-03-26
Attending: EMERGENCY MEDICINE
Payer: MEDICARE

## 2024-03-26 VITALS
TEMPERATURE: 97.2 F | HEART RATE: 78 BPM | DIASTOLIC BLOOD PRESSURE: 75 MMHG | HEIGHT: 64 IN | RESPIRATION RATE: 20 BRPM | BODY MASS INDEX: 44.61 KG/M2 | WEIGHT: 261.3 LBS | OXYGEN SATURATION: 97 % | SYSTOLIC BLOOD PRESSURE: 140 MMHG

## 2024-03-26 DIAGNOSIS — G89.29 CHRONIC RIGHT-SIDED LOW BACK PAIN, UNSPECIFIED WHETHER SCIATICA PRESENT: ICD-10-CM

## 2024-03-26 DIAGNOSIS — M54.50 CHRONIC RIGHT-SIDED LOW BACK PAIN, UNSPECIFIED WHETHER SCIATICA PRESENT: ICD-10-CM

## 2024-03-26 LAB
ALBUMIN SERPL BCP-MCNC: 4.4 G/DL (ref 3.2–4.9)
ALBUMIN/GLOB SERPL: 2.3 G/DL
ALP SERPL-CCNC: 77 U/L (ref 30–99)
ALT SERPL-CCNC: 16 U/L (ref 2–50)
ANION GAP SERPL CALC-SCNC: 12 MMOL/L (ref 7–16)
APPEARANCE UR: CLEAR
AST SERPL-CCNC: 12 U/L (ref 12–45)
BASOPHILS # BLD AUTO: 0.6 % (ref 0–1.8)
BASOPHILS # BLD: 0.03 K/UL (ref 0–0.12)
BILIRUB SERPL-MCNC: 0.5 MG/DL (ref 0.1–1.5)
BILIRUB UR QL STRIP.AUTO: NEGATIVE
BUN SERPL-MCNC: 12 MG/DL (ref 8–22)
CALCIUM ALBUM COR SERPL-MCNC: 8.9 MG/DL (ref 8.5–10.5)
CALCIUM SERPL-MCNC: 9.2 MG/DL (ref 8.4–10.2)
CHLORIDE SERPL-SCNC: 109 MMOL/L (ref 96–112)
CO2 SERPL-SCNC: 18 MMOL/L (ref 20–33)
COLOR UR: YELLOW
CREAT SERPL-MCNC: 0.95 MG/DL (ref 0.5–1.4)
EOSINOPHIL # BLD AUTO: 0.1 K/UL (ref 0–0.51)
EOSINOPHIL NFR BLD: 2 % (ref 0–6.9)
ERYTHROCYTE [DISTWIDTH] IN BLOOD BY AUTOMATED COUNT: 43.7 FL (ref 35.9–50)
GFR SERPLBLD CREATININE-BSD FMLA CKD-EPI: 80 ML/MIN/1.73 M 2
GLOBULIN SER CALC-MCNC: 1.9 G/DL (ref 1.9–3.5)
GLUCOSE SERPL-MCNC: 98 MG/DL (ref 65–99)
GLUCOSE UR STRIP.AUTO-MCNC: NEGATIVE MG/DL
HCG SERPL QL: NEGATIVE
HCT VFR BLD AUTO: 42.9 % (ref 37–47)
HGB BLD-MCNC: 14.6 G/DL (ref 12–16)
IMM GRANULOCYTES # BLD AUTO: 0.02 K/UL (ref 0–0.11)
IMM GRANULOCYTES NFR BLD AUTO: 0.4 % (ref 0–0.9)
KETONES UR STRIP.AUTO-MCNC: NEGATIVE MG/DL
LEUKOCYTE ESTERASE UR QL STRIP.AUTO: NEGATIVE
LYMPHOCYTES # BLD AUTO: 1.29 K/UL (ref 1–4.8)
LYMPHOCYTES NFR BLD: 26 % (ref 22–41)
MCH RBC QN AUTO: 32.2 PG (ref 27–33)
MCHC RBC AUTO-ENTMCNC: 34 G/DL (ref 32.2–35.5)
MCV RBC AUTO: 94.5 FL (ref 81.4–97.8)
MICRO URNS: NORMAL
MONOCYTES # BLD AUTO: 0.47 K/UL (ref 0–0.85)
MONOCYTES NFR BLD AUTO: 9.5 % (ref 0–13.4)
NEUTROPHILS # BLD AUTO: 3.06 K/UL (ref 1.82–7.42)
NEUTROPHILS NFR BLD: 61.5 % (ref 44–72)
NITRITE UR QL STRIP.AUTO: NEGATIVE
NRBC # BLD AUTO: 0 K/UL
NRBC BLD-RTO: 0 /100 WBC (ref 0–0.2)
PH UR STRIP.AUTO: 5.5 [PH] (ref 5–8)
PLATELET # BLD AUTO: 183 K/UL (ref 164–446)
PMV BLD AUTO: 11.2 FL (ref 9–12.9)
POTASSIUM SERPL-SCNC: 4.1 MMOL/L (ref 3.6–5.5)
PROT SERPL-MCNC: 6.3 G/DL (ref 6–8.2)
PROT UR QL STRIP: NEGATIVE MG/DL
RBC # BLD AUTO: 4.54 M/UL (ref 4.2–5.4)
RBC UR QL AUTO: NEGATIVE
SODIUM SERPL-SCNC: 139 MMOL/L (ref 135–145)
SP GR UR STRIP.AUTO: 1.02
WBC # BLD AUTO: 5 K/UL (ref 4.8–10.8)

## 2024-03-26 PROCEDURE — 74176 CT ABD & PELVIS W/O CONTRAST: CPT

## 2024-03-26 PROCEDURE — 700105 HCHG RX REV CODE 258: Performed by: EMERGENCY MEDICINE

## 2024-03-26 PROCEDURE — 84703 CHORIONIC GONADOTROPIN ASSAY: CPT

## 2024-03-26 PROCEDURE — 700111 HCHG RX REV CODE 636 W/ 250 OVERRIDE (IP): Mod: JZ | Performed by: EMERGENCY MEDICINE

## 2024-03-26 PROCEDURE — 81003 URINALYSIS AUTO W/O SCOPE: CPT

## 2024-03-26 PROCEDURE — 96375 TX/PRO/DX INJ NEW DRUG ADDON: CPT

## 2024-03-26 PROCEDURE — 99284 EMERGENCY DEPT VISIT MOD MDM: CPT

## 2024-03-26 PROCEDURE — 36415 COLL VENOUS BLD VENIPUNCTURE: CPT

## 2024-03-26 PROCEDURE — 96374 THER/PROPH/DIAG INJ IV PUSH: CPT

## 2024-03-26 PROCEDURE — 85025 COMPLETE CBC W/AUTO DIFF WBC: CPT

## 2024-03-26 PROCEDURE — 80053 COMPREHEN METABOLIC PANEL: CPT

## 2024-03-26 RX ORDER — SODIUM CHLORIDE 9 MG/ML
1000 INJECTION, SOLUTION INTRAVENOUS ONCE
Status: COMPLETED | OUTPATIENT
Start: 2024-03-26 | End: 2024-03-26

## 2024-03-26 RX ORDER — ONDANSETRON 2 MG/ML
4 INJECTION INTRAMUSCULAR; INTRAVENOUS ONCE
Status: COMPLETED | OUTPATIENT
Start: 2024-03-26 | End: 2024-03-26

## 2024-03-26 RX ORDER — HYDROCODONE BITARTRATE AND ACETAMINOPHEN 5; 325 MG/1; MG/1
1 TABLET ORAL EVERY 6 HOURS PRN
Qty: 10 TABLET | Refills: 0 | Status: SHIPPED | OUTPATIENT
Start: 2024-03-26 | End: 2024-03-29

## 2024-03-26 RX ORDER — ONDANSETRON 4 MG/1
4 TABLET, ORALLY DISINTEGRATING ORAL EVERY 6 HOURS PRN
Qty: 10 TABLET | Refills: 0 | Status: SHIPPED | OUTPATIENT
Start: 2024-03-26

## 2024-03-26 RX ORDER — MORPHINE SULFATE 4 MG/ML
4 INJECTION INTRAVENOUS ONCE
Status: COMPLETED | OUTPATIENT
Start: 2024-03-26 | End: 2024-03-26

## 2024-03-26 RX ADMIN — MORPHINE SULFATE 4 MG: 4 INJECTION, SOLUTION INTRAMUSCULAR; INTRAVENOUS at 10:03

## 2024-03-26 RX ADMIN — ONDANSETRON 4 MG: 2 INJECTION INTRAMUSCULAR; INTRAVENOUS at 10:03

## 2024-03-26 RX ADMIN — SODIUM CHLORIDE 1000 ML: 9 INJECTION, SOLUTION INTRAVENOUS at 10:24

## 2024-03-26 ASSESSMENT — LIFESTYLE VARIABLES
EVER HAD A DRINK FIRST THING IN THE MORNING TO STEADY YOUR NERVES TO GET RID OF A HANGOVER: NO
ON A TYPICAL DAY WHEN YOU DRINK ALCOHOL HOW MANY DRINKS DO YOU HAVE: 0
EVER FELT BAD OR GUILTY ABOUT YOUR DRINKING: NO
HAVE YOU EVER FELT YOU SHOULD CUT DOWN ON YOUR DRINKING: NO
CONSUMPTION TOTAL: NEGATIVE
HAVE PEOPLE ANNOYED YOU BY CRITICIZING YOUR DRINKING: NO
TOTAL SCORE: 0
AVERAGE NUMBER OF DAYS PER WEEK YOU HAVE A DRINK CONTAINING ALCOHOL: 0
DO YOU DRINK ALCOHOL: NO
TOTAL SCORE: 0
HOW MANY TIMES IN THE PAST YEAR HAVE YOU HAD 5 OR MORE DRINKS IN A DAY: 0
TOTAL SCORE: 0

## 2024-03-26 ASSESSMENT — FIBROSIS 4 INDEX: FIB4 SCORE: .6375

## 2024-03-26 NOTE — ED NOTES
Pt ambulate to room 2 with cane. Pt states she is unable to give urine sample at this time. Gowned, chart up for ERP

## 2024-03-26 NOTE — ED NOTES
"Pt states she has a UTI. Pt states she is having severe kidney pain. Pt states her \"temperature at home is going up.\" Pt states her oral temperature at home was 99.3 and went up to 99.8.   "

## 2024-03-26 NOTE — ED NOTES
Medication history reviewed with pt. Med rec is complete.  Allergies reviewed, per pt    Pt had a list of medications at bedside, went over list of medications and returned list back to pt at bedside.    Patient has had any outpatient antibiotics in the last 30 days, pt started MACROBID 100MG on 3/22/2024 for 5 day course, pt was told to stop and started BACTRIM 800-160MG on 3/25/2024 for 5 day course.   Pt took her last dose at 0400 today.    Pt is not on any anticoagulants

## 2024-03-26 NOTE — ED NOTES
Patient verbalized understanding to plan of care and discharge information. Patient in stable condition. Patient ambulated out of ED to person vehicle with stable gait using cane.

## 2024-03-26 NOTE — ED PROVIDER NOTES
"ED Provider Note    CHIEF COMPLAINT  Chief Complaint   Patient presents with    Abdominal Pain    Back Pain     X 1 week       EXTERNAL RECORDS REVIEWED  Outpatient Notes   [facility], [facility], cardiology    HPI/ROS  LIMITATION TO HISTORY   None  OUTSIDE HISTORIAN(S):  None    Kristin Balderrama is a 33 y.o. female who presents here for evaluation of dysuria, urgency and frequency, and associated low back pain.  Patient states that she has had this in the past, and it was a \"kidney infection.\"  Patient has no vomiting, but does complain of some mild nausea.  She has no chest pain or shortness of breath.  She has no headache, or leg pain.  Patient has no fall or trauma.  She has not taken anything prior to arrival for the same.    PAST MEDICAL HISTORY   has a past medical history of Abdominal pain, Anginal syndrome, Apnea, sleep, Arrhythmia, Arthritis, ASTHMA, Back pain, Borderline personality disorder (HCC), Breath shortness, Bronchitis (02/08/2022), Cardiac arrhythmia, Chickenpox, Chronic UTI (09/18/2010), Cough, Daytime sleepiness, Dental disorder (03/08/2021), Depression, Diabetes (HCC), Diarrhea, Disorder of thyroid, Fall, Fatigue, Frequent headaches, Gasping for breath, Gynecological disorder, Headache(784.0), Heart burn, Heart murmur, History of falling, Indigestion, Migraine, Mitochondrial disease (HCC), Multiple personality disorder (HCC), Nausea, Obesity, Other fatigue (06/29/2020), Pain (08/15/2012), Painful joint, PCOS (polycystic ovarian syndrome), Pneumonia (2012 12/2020), POTS (postural orthostatic tachycardia syndrome), Psychosis (HCC), Ringing in ears, Scoliosis, Shortness of breath, Sinus tachycardia (10/31/2013), Sleep apnea, Snoring, Supraventricular tachycardia (4/10/2019), Tonsillitis, Transverse myelitis (HCC), Tuberculosis, Urinary bladder disorder, Urinary incontinence, Weakness, and Wears glasses.    SURGICAL HISTORY   has a past surgical history that includes neuro dest " facet l/s w/ig sngl (04/21/2015); recovery (01/27/2016); [place] by laparoscopy (08/29/2010); lumbar fusion anterior (08/21/2012); other cardiac surgery (01/2016); tonsillectomy; bowel stimulator insertion (07/13/2016); gastroscopy with balloon dilatation (N/A, 01/04/2017); muscle biopsy (Right, 01/26/2017); other abdominal surgery; laminotomy; bowel stimulator insertion (03/10/2021); lap,diagnostic abdomen (02/14/2022); ovarian cystectomy (Right, 02/14/2022); percut fix prox/neck femur fx (Left, 01/28/2023); inguinal hernia laparoscopic (Right, 07/21/2023); hernia repair (Right, 07/21/2023); cystoscopy,insert ureteral stent (Right, 2/12/2024); cysto/uretero/pyeloscopy, dx (Right, 2/12/2024); and lasertripsy (Right, 2/12/2024).    FAMILY HISTORY  Family History   Problem Relation Age of Onset    Hypertension Mother     Sleep Apnea Mother     Heart Disease Mother     Other Mother         hypothryod    Hypertension Maternal Uncle     Heart Disease Maternal Grandmother     Hypertension Maternal Grandmother     No Known Problems Sister     Other Sister         Narcolepsy;fibromyalsia;bone;nerve    Genitourinary () Problems Sister         endometriosis       SOCIAL HISTORY  Social History     Tobacco Use    Smoking status: Never    Smokeless tobacco: Never   Vaping Use    Vaping Use: Never used   Substance and Sexual Activity    Alcohol use: No     Alcohol/week: 0.0 oz    Drug use: Never     Frequency: 7.0 times per week    Sexual activity: Not Currently     Birth control/protection: Implant       CURRENT MEDICATIONS  Home Medications    **Home medications have not yet been reviewed for this encounter**         ALLERGIES  Allergies   Allergen Reactions    Cefdinir Shortness of Breath and Itching     Tolerated 1/18/17  Tolerates ceftriaxone  Tolerated augmentin 8/2019     Depakote [Divalproex Sodium] Unspecified     Muscle spasms/muscle pain and weakness      Doxycycline Anaphylaxis and Vomiting     Other reaction(s):  "pustules/blisters  Other reaction(s): stomach pain    Montelukast [Singulair] Unspecified     Cardiac effusion    Vancomycin Itching     Pt becomes flushed in face and chest.   RXN=7/10/16    Wound Dressing Adhesive Rash     By pt report-\"removes skin totally off\"    Amitriptyline Unspecified     Headaches      Amoxicillin Rash          Aripiprazole Unspecified    Aripiprazole [Abilify] Unspecified     Headaches/muscle twitching      Clindamycin Nausea         Other reaction(s): nausea stomach pain    Depakote [Divalproex Sodium]     Erythromycin Rash     .  Other reaction(s): nausea stomach pain    Flomax [Tamsulosin Hydrochloride] Swelling    Hydromorphone      Other reaction(s): vomiting    Levaquin Unspecified     Severe muscle cramps in legs  RXN=unknown  Other reaction(s): leg muscle cramps    Metformin Unspecified     Increased lactic acid     Other reaction(s): itching and rash/nausea vomiting    Tamsulosin Swelling     Swelling of legs    Tape Rash     Tears skin off  coban with Tegaderm tape ok intermittently  RXN=ongoing    Ampicillin Rash     Pt reports that she received a rash     Ciprofloxacin Rash          Keflex Rash     Pt states she gets a rash with this medication  Tolerates ceftriaxone  Can take with Benadryl    Levofloxacin Unspecified     Leg muscle cramps    Metronidazole Rash     \"Vision problems\"  Other reaction(s): vision problems    Montelukast     Penicillins Hives     Can take with Benadryl    Sulfa Drugs Itching, Myalgia and Hives     Muscle pain and weakness  Other reaction(s): unknown    Valproic Acid Rash     .       PHYSICAL EXAM  VITAL SIGNS: BP (!) 145/88   Pulse 88   Temp 36.2 °C (97.2 °F) (Temporal)   Resp 20   Ht 1.626 m (5' 4\")   Wt 119 kg (261 lb 4.8 oz)   SpO2 97%   BMI 44.85 kg/m²    Constitutional: Well developed, well nourished.  Mild acute distress.  HEENT: Normocephalic, atraumatic. Posterior pharynx clear and moist.  Eyes:  EOMI. Normal sclera.  Neck: Supple, " Full range of motion, nontender.  Chest/Pulmonary: clear to ausculation. Symmetrical expansion.   Cardio: Regular rate and rhythm with no murmur.   Abdomen: Soft, prepubic tenderness.  No peritoneal signs. No guarding. No palpable masses.  Back: Mild bilateral CVA tenderness, nontender midline, no step offs.  Musculoskeletal: No deformity, no edema, neurovascular intact.   Neuro: Clear speech, appropriate, cooperative, cranial nerves II-XII grossly intact.  Psych: Anxious mood and affect      DIAGNOSTIC STUDIES / PROCEDURES  Results for orders placed or performed during the hospital encounter of 03/26/24   URINALYSIS,CULTURE IF INDICATED    Specimen: Urine, Clean Catch   Result Value Ref Range    Color Yellow     Character Clear     Specific Gravity 1.020 <1.035    Ph 5.5 5.0 - 8.0    Glucose Negative Negative mg/dL    Ketones Negative Negative mg/dL    Protein Negative Negative mg/dL    Bilirubin Negative Negative    Nitrite Negative Negative    Leukocyte Esterase Negative Negative    Occult Blood Negative Negative    Micro Urine Req see below    CBC WITH DIFFERENTIAL   Result Value Ref Range    WBC 5.0 4.8 - 10.8 K/uL    RBC 4.54 4.20 - 5.40 M/uL    Hemoglobin 14.6 12.0 - 16.0 g/dL    Hematocrit 42.9 37.0 - 47.0 %    MCV 94.5 81.4 - 97.8 fL    MCH 32.2 27.0 - 33.0 pg    MCHC 34.0 32.2 - 35.5 g/dL    RDW 43.7 35.9 - 50.0 fL    Platelet Count 183 164 - 446 K/uL    MPV 11.2 9.0 - 12.9 fL    Neutrophils-Polys 61.50 44.00 - 72.00 %    Lymphocytes 26.00 22.00 - 41.00 %    Monocytes 9.50 0.00 - 13.40 %    Eosinophils 2.00 0.00 - 6.90 %    Basophils 0.60 0.00 - 1.80 %    Immature Granulocytes 0.40 0.00 - 0.90 %    Nucleated RBC 0.00 0.00 - 0.20 /100 WBC    Neutrophils (Absolute) 3.06 1.82 - 7.42 K/uL    Lymphs (Absolute) 1.29 1.00 - 4.80 K/uL    Monos (Absolute) 0.47 0.00 - 0.85 K/uL    Eos (Absolute) 0.10 0.00 - 0.51 K/uL    Baso (Absolute) 0.03 0.00 - 0.12 K/uL    Immature Granulocytes (abs) 0.02 0.00 - 0.11 K/uL     "NRBC (Absolute) 0.00 K/uL   COMP METABOLIC PANEL   Result Value Ref Range    Sodium 139 135 - 145 mmol/L    Potassium 4.1 3.6 - 5.5 mmol/L    Chloride 109 96 - 112 mmol/L    Co2 18 (L) 20 - 33 mmol/L    Anion Gap 12.0 7.0 - 16.0    Glucose 98 65 - 99 mg/dL    Bun 12 8 - 22 mg/dL    Creatinine 0.95 0.50 - 1.40 mg/dL    Calcium 9.2 8.4 - 10.2 mg/dL    Correct Calcium 8.9 8.5 - 10.5 mg/dL    AST(SGOT) 12 12 - 45 U/L    ALT(SGPT) 16 2 - 50 U/L    Alkaline Phosphatase 77 30 - 99 U/L    Total Bilirubin 0.5 0.1 - 1.5 mg/dL    Albumin 4.4 3.2 - 4.9 g/dL    Total Protein 6.3 6.0 - 8.2 g/dL    Globulin 1.9 1.9 - 3.5 g/dL    A-G Ratio 2.3 g/dL   HCG QUAL SERUM   Result Value Ref Range    Beta-Hcg Qualitative Serum Negative Negative   ESTIMATED GFR   Result Value Ref Range    GFR (CKD-EPI) 80 >60 mL/min/1.73 m 2     *Note: Due to a large number of results and/or encounters for the requested time period, some results have not been displayed. A complete set of results can be found in Results Review.         RADIOLOGY  I have independently interpreted the diagnostic imaging associated with this visit and am waiting the final reading from the radiologist.   My preliminary interpretation is as follows: see below   Radiologist interpretation:   CT-RENAL COLIC EVALUATION(A/P W/O)   Final Result      1.  No significant change since CT abdomen and pelvis 3/22/2024      2.  Small nonobstructive right renal calculi      3.  Prior cholecystectomy. No biliary dilation.            COURSE & MEDICAL DECISION MAKING    Discharged in stable condition    INITIAL ASSESSMENT, COURSE AND PLAN  Care Narrative: This is a 33-year-old female here for evaluation of back pain.  Patient does have intermittent history of the same, she has no acute finding on CT scan, and no acute finding on lab workup.  Patient states \"maybe there was a kidney stone that was trying to work its way out.\"  I did provide pain medication to the patient secondary to her pain, " and she will follow-up as outpatient.  She is already taking tizanidine, and cannot take Motrin, I suspect this more of a low back pain issue.  Patient denies any fall or trauma, she has no incontinence of bowel bladder, no urinary retention, and no saddle anesthesia.  Patient has no fever or chills, is not an IV drug user.    DISPOSITION AND DISCUSSIONS  I have discussed management of the patient with the following physicians and ARIES's: None none    Discussion of management with other QHP or appropriate source(s): None    Escalation of care considered, and ultimately not performed: None    Barriers to care at this time, including but not limited to: Patient does not have established PCP.     Decision tools and prescription drugs considered including, but not limited to: Norco.    FINAL DIAGNOSIS  Low back strain       Electronically signed by: Valente Castellon D.O., 3/26/2024 9:25 AM

## 2024-03-26 NOTE — ED TRIAGE NOTES
Chief Complaint   Patient presents with    Abdominal Pain    Back Pain     X 1 week     Pt was seen at  . Pt is on Antibx for UTI

## 2024-03-30 ENCOUNTER — OFFICE VISIT (OUTPATIENT)
Dept: URGENT CARE | Facility: CLINIC | Age: 34
End: 2024-03-30
Payer: MEDICARE

## 2024-03-30 ENCOUNTER — APPOINTMENT (OUTPATIENT)
Dept: RADIOLOGY | Facility: IMAGING CENTER | Age: 34
End: 2024-03-30
Attending: FAMILY MEDICINE
Payer: MEDICARE

## 2024-03-30 VITALS
DIASTOLIC BLOOD PRESSURE: 74 MMHG | TEMPERATURE: 98.4 F | WEIGHT: 252.4 LBS | BODY MASS INDEX: 43.09 KG/M2 | OXYGEN SATURATION: 100 % | SYSTOLIC BLOOD PRESSURE: 126 MMHG | RESPIRATION RATE: 16 BRPM | HEIGHT: 64 IN | HEART RATE: 91 BPM

## 2024-03-30 DIAGNOSIS — J45.21 MILD INTERMITTENT ASTHMA WITH EXACERBATION: ICD-10-CM

## 2024-03-30 DIAGNOSIS — R06.02 SOB (SHORTNESS OF BREATH): ICD-10-CM

## 2024-03-30 PROCEDURE — 71046 X-RAY EXAM CHEST 2 VIEWS: CPT | Mod: TC | Performed by: FAMILY MEDICINE

## 2024-03-30 RX ORDER — METHYLPREDNISOLONE 4 MG/1
TABLET ORAL
Qty: 21 TABLET | Refills: 0 | Status: SHIPPED | OUTPATIENT
Start: 2024-03-30

## 2024-03-30 ASSESSMENT — FIBROSIS 4 INDEX: FIB4 SCORE: 0.56

## 2024-03-30 NOTE — PROGRESS NOTES
"  Subjective:      33 y.o. female presents to urgent care for cold symptoms that started about 3 days ago. She is experiencing cough, shortness of breath, and wheezing. No fever. No tobacco product use.  She does have asthma for which she uses albuterol. Typically she uses it only during fire season or when she's sick. For the last 3 days she has been using two times daily.  She is vaccinated against COVID.  No known sick contacts.    Asthma exacerbation considerations:  -Compliance with chronic asthma medications: yes  -Prior history of intubation needed to asthma: no  -Emergency department visit within the last year: no  -Intensive care unit admissions within the last year: no  -Steroid use within the last year: yes. December 2023  -Tobacco product use: no    She denies any other questions or concerns at this time.    Current problem list, medication, and past medical/surgical history were reviewed in Epic.    ROS  See HPI     Objective:      /74 (BP Location: Right arm, Patient Position: Sitting, BP Cuff Size: Large adult)   Pulse 91   Temp 36.9 °C (98.4 °F) (Temporal)   Resp 16   Ht 1.626 m (5' 4\")   Wt 114 kg (252 lb 6.4 oz)   SpO2 100%   BMI 43.32 kg/m²     Physical Exam  Constitutional:       General: She is not in acute distress.     Appearance: She is not diaphoretic.   Cardiovascular:      Rate and Rhythm: Normal rate and regular rhythm.      Heart sounds: Normal heart sounds.   Pulmonary:      Effort: Pulmonary effort is normal. No respiratory distress.      Breath sounds: Wheezing (end expiratory wheezes on the left) present.   Neurological:      Mental Status: She is alert.   Psychiatric:         Mood and Affect: Affect normal.         Judgment: Judgment normal.       Assessment/Plan:     1. Mild intermittent asthma with exacerbation  2. SOB (shortness of breath)  CXR cardiopulmonary processes.  Acute on chronic issue, asthma exacerbation.  Prescription for Medrol Dosepak has been sent.  " Continue with albuterol as prescribed.  Follow with PCP.  - DX-CHEST-2 VIEWS; Future  - methylPREDNISolone (MEDROL DOSEPAK) 4 MG Tablet Therapy Pack; Follow schedule on package instructions.  Dispense: 21 Tablet; Refill: 0      Instructed to return to Urgent Care or nearest Emergency Department if symptoms fail to improve, for any change in condition, further concerns, or new concerning symptoms. Patient states understanding of the plan of care and discharge instructions.    Lela Valencia M.D.

## 2024-04-01 ENCOUNTER — APPOINTMENT (OUTPATIENT)
Dept: RADIOLOGY | Facility: MEDICAL CENTER | Age: 34
End: 2024-04-01
Attending: STUDENT IN AN ORGANIZED HEALTH CARE EDUCATION/TRAINING PROGRAM
Payer: MEDICARE

## 2024-04-01 ENCOUNTER — OFFICE VISIT (OUTPATIENT)
Dept: URGENT CARE | Facility: CLINIC | Age: 34
End: 2024-04-01
Payer: MEDICARE

## 2024-04-01 ENCOUNTER — HOSPITAL ENCOUNTER (EMERGENCY)
Facility: MEDICAL CENTER | Age: 34
End: 2024-04-01
Attending: STUDENT IN AN ORGANIZED HEALTH CARE EDUCATION/TRAINING PROGRAM
Payer: MEDICARE

## 2024-04-01 ENCOUNTER — HOSPITAL ENCOUNTER (OUTPATIENT)
Facility: MEDICAL CENTER | Age: 34
End: 2024-04-01
Payer: MEDICARE

## 2024-04-01 VITALS
BODY MASS INDEX: 43.02 KG/M2 | TEMPERATURE: 97.5 F | HEIGHT: 64 IN | SYSTOLIC BLOOD PRESSURE: 126 MMHG | HEART RATE: 101 BPM | OXYGEN SATURATION: 98 % | WEIGHT: 252 LBS | DIASTOLIC BLOOD PRESSURE: 82 MMHG | RESPIRATION RATE: 22 BRPM

## 2024-04-01 VITALS
HEIGHT: 64 IN | TEMPERATURE: 98.9 F | BODY MASS INDEX: 43.02 KG/M2 | RESPIRATION RATE: 20 BRPM | WEIGHT: 252 LBS | OXYGEN SATURATION: 97 % | HEART RATE: 100 BPM | SYSTOLIC BLOOD PRESSURE: 149 MMHG | DIASTOLIC BLOOD PRESSURE: 67 MMHG

## 2024-04-01 DIAGNOSIS — I10 PRIMARY HYPERTENSION: ICD-10-CM

## 2024-04-01 DIAGNOSIS — E87.8 LOW BICARBONATE: ICD-10-CM

## 2024-04-01 DIAGNOSIS — R06.02 SHORTNESS OF BREATH: ICD-10-CM

## 2024-04-01 DIAGNOSIS — Z87.09 HX OF EXTRINSIC ASTHMA: ICD-10-CM

## 2024-04-01 DIAGNOSIS — R39.9 UTI SYMPTOMS: ICD-10-CM

## 2024-04-01 DIAGNOSIS — J45.901 ASTHMA WITH ACUTE EXACERBATION, UNSPECIFIED ASTHMA SEVERITY, UNSPECIFIED WHETHER PERSISTENT: ICD-10-CM

## 2024-04-01 DIAGNOSIS — R73.9 HYPERGLYCEMIA: ICD-10-CM

## 2024-04-01 LAB
ALBUMIN SERPL BCP-MCNC: 4.6 G/DL (ref 3.2–4.9)
ALBUMIN/GLOB SERPL: 2 G/DL
ALP SERPL-CCNC: 78 U/L (ref 30–99)
ALT SERPL-CCNC: 31 U/L (ref 2–50)
ANION GAP SERPL CALC-SCNC: 21 MMOL/L (ref 7–16)
APPEARANCE UR: NORMAL
AST SERPL-CCNC: 16 U/L (ref 12–45)
BASE EXCESS BLDV CALC-SCNC: -9 MMOL/L
BASOPHILS # BLD AUTO: 0.3 % (ref 0–1.8)
BASOPHILS # BLD: 0.03 K/UL (ref 0–0.12)
BILIRUB SERPL-MCNC: 0.2 MG/DL (ref 0.1–1.5)
BILIRUB UR STRIP-MCNC: NEGATIVE MG/DL
BODY TEMPERATURE: 36.9 CENTIGRADE
BUN SERPL-MCNC: 10 MG/DL (ref 8–22)
CALCIUM ALBUM COR SERPL-MCNC: 8.4 MG/DL (ref 8.5–10.5)
CALCIUM SERPL-MCNC: 8.9 MG/DL (ref 8.5–10.5)
CHLORIDE SERPL-SCNC: 108 MMOL/L (ref 96–112)
CO2 SERPL-SCNC: 12 MMOL/L (ref 20–33)
COLOR UR AUTO: YELLOW
CREAT SERPL-MCNC: 0.75 MG/DL (ref 0.5–1.4)
D DIMER PPP IA.FEU-MCNC: <0.27 UG/ML (FEU) (ref 0–0.5)
EKG IMPRESSION: NORMAL
EOSINOPHIL # BLD AUTO: 0 K/UL (ref 0–0.51)
EOSINOPHIL NFR BLD: 0 % (ref 0–6.9)
ERYTHROCYTE [DISTWIDTH] IN BLOOD BY AUTOMATED COUNT: 43.8 FL (ref 35.9–50)
GFR SERPLBLD CREATININE-BSD FMLA CKD-EPI: 107 ML/MIN/1.73 M 2
GLOBULIN SER CALC-MCNC: 2.3 G/DL (ref 1.9–3.5)
GLUCOSE SERPL-MCNC: 134 MG/DL (ref 65–99)
GLUCOSE UR STRIP.AUTO-MCNC: NEGATIVE MG/DL
HCG SERPL QL: NEGATIVE
HCO3 BLDV-SCNC: 15 MMOL/L (ref 24–28)
HCT VFR BLD AUTO: 44.2 % (ref 37–47)
HGB BLD-MCNC: 14.9 G/DL (ref 12–16)
IMM GRANULOCYTES # BLD AUTO: 0.03 K/UL (ref 0–0.11)
IMM GRANULOCYTES NFR BLD AUTO: 0.3 % (ref 0–0.9)
INHALED O2 FLOW RATE: ABNORMAL L/MIN
KETONES UR STRIP.AUTO-MCNC: NORMAL MG/DL
LEUKOCYTE ESTERASE UR QL STRIP.AUTO: NEGATIVE
LYMPHOCYTES # BLD AUTO: 1.16 K/UL (ref 1–4.8)
LYMPHOCYTES NFR BLD: 13 % (ref 22–41)
MCH RBC QN AUTO: 31.8 PG (ref 27–33)
MCHC RBC AUTO-ENTMCNC: 33.7 G/DL (ref 32.2–35.5)
MCV RBC AUTO: 94.2 FL (ref 81.4–97.8)
MONOCYTES # BLD AUTO: 0.54 K/UL (ref 0–0.85)
MONOCYTES NFR BLD AUTO: 6.1 % (ref 0–13.4)
NEUTROPHILS # BLD AUTO: 7.16 K/UL (ref 1.82–7.42)
NEUTROPHILS NFR BLD: 80.3 % (ref 44–72)
NITRITE UR QL STRIP.AUTO: NEGATIVE
NRBC # BLD AUTO: 0 K/UL
NRBC BLD-RTO: 0 /100 WBC (ref 0–0.2)
PCO2 BLDV: 28.1 MMHG (ref 41–51)
PCO2 TEMP ADJ BLDV: 28 MMHG (ref 41–51)
PH BLDV: 7.34 [PH] (ref 7.31–7.45)
PH TEMP ADJ BLDV: 7.34 [PH] (ref 7.31–7.45)
PH UR STRIP.AUTO: 7 [PH] (ref 5–8)
PLATELET # BLD AUTO: 165 K/UL (ref 164–446)
PMV BLD AUTO: 11 FL (ref 9–12.9)
PO2 BLDV: 43.6 MMHG (ref 25–40)
PO2 TEMP ADJ BLDV: 43.3 MMHG (ref 25–40)
POTASSIUM SERPL-SCNC: 4 MMOL/L (ref 3.6–5.5)
PROT SERPL-MCNC: 6.9 G/DL (ref 6–8.2)
PROT UR QL STRIP: NEGATIVE MG/DL
RBC # BLD AUTO: 4.69 M/UL (ref 4.2–5.4)
RBC UR QL AUTO: NEGATIVE
SAO2 % BLDV: 76.1 %
SODIUM SERPL-SCNC: 141 MMOL/L (ref 135–145)
SP GR UR STRIP.AUTO: 1.02
TROPONIN T SERPL-MCNC: <6 NG/L (ref 6–19)
UROBILINOGEN UR STRIP-MCNC: 0.2 MG/DL
WBC # BLD AUTO: 8.9 K/UL (ref 4.8–10.8)

## 2024-04-01 PROCEDURE — 82803 BLOOD GASES ANY COMBINATION: CPT

## 2024-04-01 PROCEDURE — 81002 URINALYSIS NONAUTO W/O SCOPE: CPT

## 2024-04-01 PROCEDURE — 93005 ELECTROCARDIOGRAM TRACING: CPT | Performed by: STUDENT IN AN ORGANIZED HEALTH CARE EDUCATION/TRAINING PROGRAM

## 2024-04-01 PROCEDURE — 80053 COMPREHEN METABOLIC PANEL: CPT

## 2024-04-01 PROCEDURE — 93005 ELECTROCARDIOGRAM TRACING: CPT

## 2024-04-01 PROCEDURE — 700102 HCHG RX REV CODE 250 W/ 637 OVERRIDE(OP): Mod: UD | Performed by: STUDENT IN AN ORGANIZED HEALTH CARE EDUCATION/TRAINING PROGRAM

## 2024-04-01 PROCEDURE — 96375 TX/PRO/DX INJ NEW DRUG ADDON: CPT

## 2024-04-01 PROCEDURE — 36415 COLL VENOUS BLD VENIPUNCTURE: CPT

## 2024-04-01 PROCEDURE — 71045 X-RAY EXAM CHEST 1 VIEW: CPT

## 2024-04-01 PROCEDURE — 3079F DIAST BP 80-89 MM HG: CPT

## 2024-04-01 PROCEDURE — 96365 THER/PROPH/DIAG IV INF INIT: CPT

## 2024-04-01 PROCEDURE — 84484 ASSAY OF TROPONIN QUANT: CPT

## 2024-04-01 PROCEDURE — 96366 THER/PROPH/DIAG IV INF ADDON: CPT

## 2024-04-01 PROCEDURE — 99215 OFFICE O/P EST HI 40 MIN: CPT

## 2024-04-01 PROCEDURE — A9270 NON-COVERED ITEM OR SERVICE: HCPCS | Mod: UD | Performed by: STUDENT IN AN ORGANIZED HEALTH CARE EDUCATION/TRAINING PROGRAM

## 2024-04-01 PROCEDURE — 87086 URINE CULTURE/COLONY COUNT: CPT

## 2024-04-01 PROCEDURE — 85379 FIBRIN DEGRADATION QUANT: CPT

## 2024-04-01 PROCEDURE — 85025 COMPLETE CBC W/AUTO DIFF WBC: CPT

## 2024-04-01 PROCEDURE — 3074F SYST BP LT 130 MM HG: CPT

## 2024-04-01 PROCEDURE — 84703 CHORIONIC GONADOTROPIN ASSAY: CPT

## 2024-04-01 PROCEDURE — 700111 HCHG RX REV CODE 636 W/ 250 OVERRIDE (IP): Mod: UD | Performed by: STUDENT IN AN ORGANIZED HEALTH CARE EDUCATION/TRAINING PROGRAM

## 2024-04-01 PROCEDURE — 94640 AIRWAY INHALATION TREATMENT: CPT

## 2024-04-01 PROCEDURE — 700101 HCHG RX REV CODE 250: Performed by: STUDENT IN AN ORGANIZED HEALTH CARE EDUCATION/TRAINING PROGRAM

## 2024-04-01 PROCEDURE — 99285 EMERGENCY DEPT VISIT HI MDM: CPT

## 2024-04-01 PROCEDURE — 700105 HCHG RX REV CODE 258: Mod: UD | Performed by: STUDENT IN AN ORGANIZED HEALTH CARE EDUCATION/TRAINING PROGRAM

## 2024-04-01 RX ORDER — MORPHINE SULFATE 4 MG/ML
2 INJECTION INTRAVENOUS ONCE
Status: COMPLETED | OUTPATIENT
Start: 2024-04-01 | End: 2024-04-01

## 2024-04-01 RX ORDER — ONDANSETRON 2 MG/ML
4 INJECTION INTRAMUSCULAR; INTRAVENOUS ONCE
Status: COMPLETED | OUTPATIENT
Start: 2024-04-01 | End: 2024-04-01

## 2024-04-01 RX ORDER — MAGNESIUM SULFATE HEPTAHYDRATE 40 MG/ML
2 INJECTION, SOLUTION INTRAVENOUS ONCE
Status: COMPLETED | OUTPATIENT
Start: 2024-04-01 | End: 2024-04-01

## 2024-04-01 RX ORDER — DEXAMETHASONE 4 MG/1
12 TABLET ORAL DAILY
Status: DISCONTINUED | OUTPATIENT
Start: 2024-04-01 | End: 2024-04-01 | Stop reason: HOSPADM

## 2024-04-01 RX ORDER — SODIUM CHLORIDE 9 MG/ML
1000 INJECTION, SOLUTION INTRAVENOUS ONCE
Status: COMPLETED | OUTPATIENT
Start: 2024-04-01 | End: 2024-04-01

## 2024-04-01 RX ADMIN — Medication 10 MG/HR: at 16:54

## 2024-04-01 RX ADMIN — DEXAMETHASONE 12 MG: 4 TABLET ORAL at 16:47

## 2024-04-01 RX ADMIN — MORPHINE SULFATE 2 MG: 4 INJECTION, SOLUTION INTRAMUSCULAR; INTRAVENOUS at 19:58

## 2024-04-01 RX ADMIN — SODIUM CHLORIDE 1000 ML: 9 INJECTION, SOLUTION INTRAVENOUS at 17:27

## 2024-04-01 RX ADMIN — MAGNESIUM SULFATE HEPTAHYDRATE 2 G: 2 INJECTION, SOLUTION INTRAVENOUS at 17:30

## 2024-04-01 RX ADMIN — ONDANSETRON 4 MG: 2 INJECTION INTRAMUSCULAR; INTRAVENOUS at 17:31

## 2024-04-01 RX ADMIN — Medication 10 MG/HR: at 20:20

## 2024-04-01 RX ADMIN — IPRATROPIUM BROMIDE 0.5 MG: 0.5 SOLUTION RESPIRATORY (INHALATION) at 16:55

## 2024-04-01 ASSESSMENT — FIBROSIS 4 INDEX
FIB4 SCORE: 0.56
FIB4 SCORE: 0.56

## 2024-04-01 NOTE — ED PROVIDER NOTES
"ED Provider Note    CHIEF COMPLAINT  Chief Complaint   Patient presents with    Shortness of Breath     SOB x5 days. Patient states she has had an asthma attack everyday since Thursday. Pt reports home use of nebulizer x1 today with no relief.        EXTERNAL RECORDS REVIEWED  Outpatient Notes outpatient notes for asthma and exacerbation at urgent care 3/30, history of kidney stones, history of POTS.    HPI/ROS  LIMITATION TO HISTORY   Select: : None  OUTSIDE HISTORIAN(S):  none    Kristin Balderrama is a 33 y.o. female who presents with complaint of chest tightness and shortness of breath for the past 5 days.  Patient reports she feels that her asthma is flaring up.  She has been using her albuterol at home without relief.  She feels that she has had an asthma attack every day.  She has had no fevers, no recent viral symptoms or cough.  She states her symptoms are always bad when it is \"fire season\".  She otherwise notes no vomiting, no other medications taken other than her asthma inhaler.    PAST MEDICAL HISTORY   has a past medical history of Abdominal pain, Anginal syndrome, Apnea, sleep, Arrhythmia, Arthritis, ASTHMA, Back pain, Borderline personality disorder (HCC), Breath shortness, Bronchitis (02/08/2022), Cardiac arrhythmia, Chickenpox, Chronic UTI (09/18/2010), Cough, Daytime sleepiness, Dental disorder (03/08/2021), Depression, Diabetes (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), Nausea, Obesity, Other fatigue (06/29/2020), Pain (08/15/2012), Painful joint, PCOS (polycystic ovarian syndrome), Pneumonia (2012 12/2020), POTS (postural orthostatic tachycardia syndrome), Psychosis ([facility]), Ringing in ears, Scoliosis, Shortness of breath, Sinus tachycardia (10/31/2013), Sleep apnea, " No care due was identified.  [facility] Embedded Care Due Messages. Reference number: [id number].   3/31/2024 6:58:52 AM CDT   Snoring, Supraventricular tachycardia (HCC) (4/10/2019), Tonsillitis, Transverse myelitis (HCC), Tuberculosis, Urinary bladder disorder, Urinary incontinence, Weakness, and Wears glasses.    SURGICAL HISTORY   has a past surgical history that includes neuro dest facet l/s w/ig sngl (04/21/2015); recovery (01/27/2016); [place] by laparoscopy (08/29/2010); lumbar fusion anterior (08/21/2012); other cardiac surgery (01/2016); tonsillectomy; bowel stimulator insertion (07/13/2016); gastroscopy with balloon dilatation (N/A, 01/04/2017); muscle biopsy (Right, 01/26/2017); other abdominal surgery; laminotomy; bowel stimulator insertion (03/10/2021); lap,diagnostic abdomen (02/14/2022); ovarian cystectomy (Right, 02/14/2022); percut fix prox/neck femur fx (Left, 01/28/2023); inguinal hernia laparoscopic (Right, 07/21/2023); hernia repair (Right, 07/21/2023); cystoscopy,insert ureteral stent (Right, 2/12/2024); cysto/uretero/pyeloscopy, dx (Right, 2/12/2024); and lasertripsy (Right, 2/12/2024).    FAMILY HISTORY  Family History   Problem Relation Age of Onset    Hypertension Mother     Sleep Apnea Mother     Heart Disease Mother     Other Mother         hypothryod    Hypertension Maternal Uncle     Heart Disease Maternal Grandmother     Hypertension Maternal Grandmother     No Known Problems Sister     Other Sister         Narcolepsy;fibromyalsia;bone;nerve    Genitourinary () Problems Sister         endometriosis       SOCIAL HISTORY  Social History     Tobacco Use    Smoking status: Never    Smokeless tobacco: Never   Vaping Use    Vaping Use: Never used   Substance and Sexual Activity    Alcohol use: No     Alcohol/week: 0.0 oz    Drug use: Never     Frequency: 7.0 times per week    Sexual activity: Not Currently     Birth control/protection: Implant       CURRENT MEDICATIONS  Home Medications       Reviewed by Mady Duarte R.N. (Registered Nurse) on 04/01/24 at 1441  Med List Status: Partial     Medication  "Last Dose Status   acetaminophen (TYLENOL) 500 MG Tab  Active   adalimumab (HUMIRA) 80 MG/0.8ML injection  Active   amLODIPine (NORVASC) 5 MG Tab  Active   BORIC ACID VAGINAL VA  Active   BORIC ACID VAGINAL VA  Active   diphenhydrAMINE (BENADRYL) 25 MG Tab  Active   docusate sodium 250 MG Cap  Active   etonogestrel (NEXPLANON) 68 MG Implant implant  Active   gabapentin (NEURONTIN) 400 MG Cap  Active   Galcanezumab-gnlm (EMGALITY) 120 MG/ML Solution Auto-injector  Active   ivabradine (CORLANOR) 7.5 MG Tab tablet  Active   lamoTRIgine (LAMICTAL) 25 MG Tab  Active   Melatonin 10 MG Tab  Active   methylPREDNISolone (MEDROL DOSEPAK) 4 MG Tablet Therapy Pack  Active   metoprolol SR (TOPROL XL) 25 MG TABLET SR 24 HR  Active   omeprazole (PRILOSEC) 20 MG delayed-release capsule  Active   ondansetron (ZOFRAN ODT) 4 MG TABLET DISPERSIBLE  Active   Rimegepant Sulfate (NURTEC) 75 MG TABLET DISPERSIBLE  Active   sodium chloride (SALT) 1 GM Tab  Active   tizanidine (ZANAFLEX) 4 MG Tab  Active   topiramate (TOPAMAX) 50 MG tablet  Active   ziprasidone (GEODON) 40 MG Cap  Active                    ALLERGIES  Allergies   Allergen Reactions    Cefdinir Shortness of Breath and Itching     Tolerated 1/18/17  Tolerates ceftriaxone  Tolerated augmentin 8/2019     Depakote [Divalproex Sodium] Unspecified     Muscle spasms/muscle pain and weakness      Depakote [Divalproex Sodium]      Muscle spasms/muscle pain and weakness    Doxycycline Anaphylaxis and Vomiting     Other reaction(s): pustules/blisters  Other reaction(s): stomach pain    Montelukast      Cardiac effusion    Montelukast [Singulair] Unspecified     Cardiac effusion    Vancomycin Itching     Pt becomes flushed in face and chest.   RXN=7/10/16    Wound Dressing Adhesive Rash     By pt report-\"removes skin totally off\"    Amitriptyline Unspecified     Headaches      Amoxicillin Rash          Aripiprazole Unspecified     Headaches/muscle twitching      Aripiprazole [Abilify] " "Unspecified     Headaches/muscle twitching      Clindamycin Nausea         Other reaction(s): nausea stomach pain    Erythromycin Rash     .  Other reaction(s): nausea stomach pain    Flomax [Tamsulosin Hydrochloride] Swelling    Hydromorphone      Other reaction(s): vomiting    Levaquin Unspecified     Severe muscle cramps in legs  RXN=unknown  Other reaction(s): leg muscle cramps    Metformin Unspecified     Increased lactic acid     Other reaction(s): itching and rash/nausea vomiting    Sulfa Drugs Hives, Itching, Myalgia and Unspecified     Muscle pain and weakness    Other reaction(s): unknown    Tamsulosin Swelling     Swelling of legs    Tape Rash     Tears skin off  coban with Tegaderm tape ok intermittently  RXN=ongoing    Ampicillin Rash     Pt reports that she received a rash     Ciprofloxacin Rash          Keflex Rash     Pt states she gets a rash with this medication  Tolerates ceftriaxone  Can take with Benadryl    Levofloxacin Unspecified     Leg muscle cramps    Metronidazole Rash     \"Vision problems\"  Other reaction(s): vision problems    Penicillins Hives     Can take with Benadryl    Valproic Acid Rash     .       PHYSICAL EXAM  VITAL SIGNS: BP (!) 149/67   Pulse 100   Temp 37.2 °C (98.9 °F) (Temporal)   Resp 20   Ht 1.626 m (5' 4\")   Wt 114 kg (252 lb)   SpO2 97%   BMI 43.26 kg/m²    Constitutional: Awake and alert. No acute distress.  Head: NCAT.  HEENT: Normal Conjunctiva. PERRLA.  Neck: Grossly normal range of motion. Airway midline.  Cardiovascular: Normal heart rate, Normal rhythm.  Thorax & Lungs: Presenting with tachypnea and mild respiratory distress. Clear to Auscultation bilaterally.  No wheezes or rhonchi noted.  She is talking in short sentences.  Abdomen: Normal inspection. Nontender. Nondistended  Skin: No obvious rash.  Back: No tenderness, No CVA tenderness.   Musculoskeletal: No obvious deformity. Moves all extremities Well.  Neurologic: A&Ox3.   Psychiatric: Mood and " affect are appropriate for situation.    EKG/LABS  Results for orders placed or performed during the hospital encounter of 04/01/24   HCG QUAL SERUM   Result Value Ref Range    Beta-Hcg Qualitative Serum Negative Negative   Comp Metabolic Panel   Result Value Ref Range    Sodium 141 135 - 145 mmol/L    Potassium 4.0 3.6 - 5.5 mmol/L    Chloride 108 96 - 112 mmol/L    Co2 12 (L) 20 - 33 mmol/L    Anion Gap 21.0 (H) 7.0 - 16.0    Glucose 134 (H) 65 - 99 mg/dL    Bun 10 8 - 22 mg/dL    Creatinine 0.75 0.50 - 1.40 mg/dL    Calcium 8.9 8.5 - 10.5 mg/dL    Correct Calcium 8.4 (L) 8.5 - 10.5 mg/dL    AST(SGOT) 16 12 - 45 U/L    ALT(SGPT) 31 2 - 50 U/L    Alkaline Phosphatase 78 30 - 99 U/L    Total Bilirubin 0.2 0.1 - 1.5 mg/dL    Albumin 4.6 3.2 - 4.9 g/dL    Total Protein 6.9 6.0 - 8.2 g/dL    Globulin 2.3 1.9 - 3.5 g/dL    A-G Ratio 2.0 g/dL   VENOUS BLOOD GAS   Result Value Ref Range    Venous Bg Ph 7.34 7.31 - 7.45    Venous Bg Ph Temp Corrected 7.34 7.31 - 7.45    Venous Bg Pco2 28.1 (L) 41.0 - 51.0 mmHg    Venous Bg Pco2 Temp Corrected 28.0 (L) 41.0 - 51.0 mmHg    Venous Bg Po2 43.6 (H) 25.0 - 40.0 mmHg    Venous Bg Po2 Temp Corrected 43.3 (H) 25.0 - 40.0 mmHg    Venous Bg O2 Saturation 76.1 %    Venous Bg Hco3 15 (L) 24 - 28 mmol/L    Venous Bg Base Excess -9 mmol/L    Body Temp 36.9 Centigrade    O2 Therapy 10L    D-DIMER   Result Value Ref Range    D-Dimer <0.27 0.00 - 0.50 ug/mL (FEU)   CBC WITH DIFFERENTIAL   Result Value Ref Range    WBC 8.9 4.8 - 10.8 K/uL    RBC 4.69 4.20 - 5.40 M/uL    Hemoglobin 14.9 12.0 - 16.0 g/dL    Hematocrit 44.2 37.0 - 47.0 %    MCV 94.2 81.4 - 97.8 fL    MCH 31.8 27.0 - 33.0 pg    MCHC 33.7 32.2 - 35.5 g/dL    RDW 43.8 35.9 - 50.0 fL    Platelet Count 165 164 - 446 K/uL    MPV 11.0 9.0 - 12.9 fL    Neutrophils-Polys 80.30 (H) 44.00 - 72.00 %    Lymphocytes 13.00 (L) 22.00 - 41.00 %    Monocytes 6.10 0.00 - 13.40 %    Eosinophils 0.00 0.00 - 6.90 %    Basophils 0.30 0.00 - 1.80 %     Immature Granulocytes 0.30 0.00 - 0.90 %    Nucleated RBC 0.00 0.00 - 0.20 /100 WBC    Neutrophils (Absolute) 7.16 1.82 - 7.42 K/uL    Lymphs (Absolute) 1.16 1.00 - 4.80 K/uL    Monos (Absolute) 0.54 0.00 - 0.85 K/uL    Eos (Absolute) 0.00 0.00 - 0.51 K/uL    Baso (Absolute) 0.03 0.00 - 0.12 K/uL    Immature Granulocytes (abs) 0.03 0.00 - 0.11 K/uL    NRBC (Absolute) 0.00 K/uL   TROPONIN   Result Value Ref Range    Troponin T <6 6 - 19 ng/L   ESTIMATED GFR   Result Value Ref Range    GFR (CKD-EPI) 107 >60 mL/min/1.73 m 2   EKG   Result Value Ref Range    Report       [facility] Emergency Dept.    Test Date:  2024  Pt Name:    HARLEY DE LA CRUZ              Department: ER  MRN:        [id number]                      Room:  Gender:     Female                       Technician: [id number]  :        1990                   Requested By:ER TRIAGE PROTOCOL  Order #:    [id number]                    Reading MD:    Measurements  Intervals                                Axis  Rate:       88                           P:          40  MD:         132                          QRS:        38  QRSD:       73                           T:          5  QT:         375  QTc:        454    Interpretive Statements  Sinus rhythm  Borderline T abnormalities, anterior leads  Compared to ECG 2024 18:29:21  T-wave abnormality now present       *Note: Due to a large number of results and/or encounters for the requested time period, some results have not been displayed. A complete set of results can be found in Results Review.     I have independently interpreted this EKG    RADIOLOGY  I have independently interpreted the diagnostic imaging associated with this visit and am waiting the final reading from the radiologist.   My preliminary interpretation is as follows: no opacity ,no effusion    Radiologist interpretation:  DX-CHEST-PORTABLE (1 VIEW)   Final Result      No evidence of acute cardiopulmonary process.           COURSE & MEDICAL DECISION MAKING    ASSESSMENT, COURSE AND PLAN  Care Narrative:   This is a 33-year-old female history of asthma, POTS, hypermobility syndrome who presents with shortness of breath for 5 days reporting worsening of asthma symptoms and without fevers.  Afebrile, hypertensive, no hypoxia.  She is tachypneic appearing on exam, lungs are clear throughout.  She is talking in brief sentences.  We will start an hour-long nebulization and provide steroids.  Given her presenting appearance we will give magnesium as well and started IV.  Chest x-ray without acute findings  Patient receiving all medications.  On repeat assessment after nebulization patient is reporting some improvement but still seemingly tachypneic.  Workup broadened to include evaluation of ACS, PE and will obtain a blood gas to rule out hypercapnia or severe asthma symptoms.  Patient received a second nebulization treatment.  On walk by the room she is comfortable and on her phone.  When entering the room patient is demonstrating tachypnea symptoms again although much more mild.  She is reporting 90% benefit from workup and treatments today.  D-dimer returns back negative, troponin returns back negative.  Her bicarb is low at 12, venous blood gas with appropriate pH, decreased CO2 as well.  DKA or euglycemic DKA considered however patient glucose minimally elevated, her pH on her blood gas is appropriate.  She is not on SGLT2 inhibitors.  Was offered admission given continued clinical appearance of respiratory distress however patient again reports 90% improvement and would like to be discharged.  She is without hypoxia, no wheezing and workup is largely reassuring.  Advised to return if symptoms not resolved in the next 2 or 3 days otherwise follow-up with her primary care physician.  Chest Pain:  heart score 2  Hydration: HYDRATION: Based on the patient's presentation of Dehydration and Tachycardia the patient was given IV fluids.  IV Hydration was used because oral hydration was not as rapid as required. Upon recheck following hydration, the patient was improved.          ADDITIONAL PROBLEMS MANAGED    Low bicarb  Hyperventilation    DISPOSITION AND DISCUSSIONS  I have discussed management of the patient with the following physicians and ARIES's:  none    Discussion of management with other QHP or appropriate source(s): RT for nebulizations      Escalation of care considered, and ultimately not performed:Laboratory analysis, diagnostic imaging, and acute inpatient care management, however at this time, the patient is most appropriate for outpatient management    Barriers to care at this time, including but not limited to:  none .     Decision tools and prescription drugs considered including, but not limited to:  none .    FINAL DIAGNOSIS  1. Shortness of breath    2. Primary hypertension    3. Hx of extrinsic asthma    4. Low bicarbonate    5. Hyperglycemia           Electronically signed by: Sebas Galicia D.O., 4/1/2024 4:17 PM

## 2024-04-01 NOTE — ED NOTES
Pt taken to peds 43 in wheelchair. Respirations are even but rapid. Placed on monitor. Changed into gown.Chart up for ERP to see.

## 2024-04-01 NOTE — PROGRESS NOTES
Subjective:   Kristin Balderrama is a 33 y.o. female who presents for Painful Urination and Asthma      HPI:     #1 UTI symptoms: this is a 33-year-old female who presents today for UTI symptoms.  She reports that she has developed hazy and foul-smelling urine that started this morning.  She recently had a UTI with positive culture results for citrobacter freundii complex.  She has been treated with a course of nitrofurantoin and Bactrim which was pansensitive.  She denies any fevers, chills, body aches.    #2 asthma exacerbation: The patient was recently seen on 3\30\24 for asthma exacerbation and shortness of breath.  She was placed on methylprednisone.  She states that she has been taking the steroids and using her Combivent inhaler and albuterol inhaler around-the-clock without any improvement in her symptoms.  She reports worsening symptoms.  She states that she is feeling short of breath and having a difficult time breathing.  Patient reports that she last used her nebulizer 2 hours ago      Review of Systems   Constitutional:  Negative for fever.   Respiratory:  Positive for cough and shortness of breath. Negative for sputum production.    Genitourinary:  Negative for dysuria, flank pain, frequency, hematuria and urgency.        + Foul-smelling urine, hazy urine       Medications:    Current Outpatient Medications on File Prior to Visit   Medication Sig Dispense Refill    methylPREDNISolone (MEDROL DOSEPAK) 4 MG Tablet Therapy Pack Follow schedule on package instructions. 21 Tablet 0    ondansetron (ZOFRAN ODT) 4 MG TABLET DISPERSIBLE Take 1 Tablet by mouth every 6 hours as needed for Nausea/Vomiting. 10 Tablet 0    adalimumab (HUMIRA) 80 MG/0.8ML injection Inject 80 mg under the skin every 14 days. Last injection was on 3/14/2024      ivabradine (CORLANOR) 7.5 MG Tab tablet Take 1 Tablet by mouth 2 times a day with meals. 60 Tablet 11    amLODIPine (NORVASC) 5 MG Tab Take 5 mg by mouth every day.       Rimegepant Sulfate (NURTEC) 75 MG TABLET DISPERSIBLE Take 75 mg by mouth 1 time a day as needed (migraine).      gabapentin (NEURONTIN) 400 MG Cap Take 1,200 mg by mouth 3 times a day. 3 capsules=1200mg      acetaminophen (TYLENOL) 500 MG Tab Take 1,000 mg by mouth 1 time a day as needed for Mild Pain. Indications: Pain      BORIC ACID VAGINAL VA Insert 1 Suppository into the vagina 1 time a day as needed (yeast infection).      docusate sodium 250 MG Cap Take 250 mg by mouth 2 times a day.      sodium chloride (SALT) 1 GM Tab TAKE 1 TABLET BY MOUTH THREE TIMES A DAY WITH MEALS ( NOT COVERED/INS ) (Patient taking differently: Take 1 g by mouth 3 times a day.) 90 Tablet 2    omeprazole (PRILOSEC) 20 MG delayed-release capsule Take 20 mg by mouth 2 times a day.      lamoTRIgine (LAMICTAL) 25 MG Tab Take 50 mg by mouth 2 times a day. 50 mg = 2 tablets      Galcanezumab-gnlm (EMGALITY) 120 MG/ML Solution Auto-injector Inject 120 mg under the skin every 30 (thirty) days. On the 27th of every month, last dose was taken on 2/27/2024      topiramate (TOPAMAX) 50 MG tablet Take 50 mg by mouth 2 times a day.      metoprolol SR (TOPROL XL) 25 MG TABLET SR 24 HR Take 1 Tablet by mouth every day. 90 Tablet 3    tizanidine (ZANAFLEX) 4 MG Tab Take 4 mg by mouth 2 times a day.      ziprasidone (GEODON) 40 MG Cap Take 1 capsule by mouth at bedtime. 30 Capsule 2    diphenhydrAMINE (BENADRYL) 25 MG Tab Take 25-50 mg by mouth 2 times a day. 25 mg in the morning, 50 mg in the evening      Melatonin 10 MG Tab Take 10 mg by mouth at bedtime.      etonogestrel (NEXPLANON) 68 MG Implant implant Inject 68 mg under the skin see administration instructions. Pt reports she is not sure when she had this medications injected last, pt reports she still has it in her arm  Every 3 years to change, thinks it was placed 2021      BORIC ACID VAGINAL VA Insert 1 Suppository into the vagina as needed (For yeast). (OTC)       No current  facility-administered medications on file prior to visit.        Allergies:   Cefdinir, Depakote [divalproex sodium], Depakote [divalproex sodium], Doxycycline, Montelukast, Montelukast [singulair], Vancomycin, Wound dressing adhesive, Amitriptyline, Amoxicillin, Aripiprazole, Aripiprazole [abilify], Clindamycin, Erythromycin, Flomax [tamsulosin hydrochloride], Hydromorphone, Levaquin, Metformin, Sulfa drugs, Tamsulosin, Tape, Ampicillin, Ciprofloxacin, Keflex, Levofloxacin, Metronidazole, Penicillins, and Valproic acid    Problem List:   Patient Active Problem List   Diagnosis    Borderline personality disorder (HCC)    AP (abdominal pain)    Urinary retention    Dissociative identity disorder (HCC)    Hyperglycemia    Anxiety    Fatty liver disease, nonalcoholic    Atypical chest pain    Peripheral neuropathy and chronic pain syndrome (CMS-HCC)    Severe obesity (BMI >= 40) ([facility])    Scoliosis    GERD (gastroesophageal reflux disease)    Lactic acidosis    Vitamin D deficiency    Polypharmacy    Full incontinence of feces    Schizophrenia (HCC)    Mood disorder (HCC)    Functional diarrhea    TONYA (obstructive sleep apnea)    Muscular deconditioning    Supraventricular tachycardia (HCC)    Optic neuritis    Moderate persistent asthma with acute exacerbation    Immunocompromised (HCC)    Mild intermittent asthma without complication    Mixed stress and urge urinary incontinence    IIH (idiopathic intracranial hypertension)    Schizoaffective disorder, depressive type (HCC)    PTSD (post-traumatic stress disorder)    Chronic obstructive lung disease (HCC)    Transverse myelitis (HCC)    Dry eyes    Prolonged Q-T interval on ECG    UTI (urinary tract infection)    Suprapubic catheter dysfunction (HCC)    Bilateral hand pain    Uncomplicated asthma    Blood per rectum    GIB (gastrointestinal bleeding)    Normal pressure hydrocephalus (HCC)    Acute bilateral low back pain with bilateral sciatica    Failure to thrive in  adult    Diplopia    Psychogenic disorder    Inappropriate sinus tachycardia (HCC)    Palpitations    Vision changes    Hidradenitis axillaris    Migraine, hemiplegic    Irritable bowel syndrome    Hemiplegic 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, not intractable    Hypermobility syndrome    Hip fracture, left, closed, initial encounter ([facility])    Moderate persistent asthma without complication    Postural orthostatic tachycardia syndrome    S/p left hip fracture    Right inguinal hernia    Migraine    Kidney stone    Insomnia    Heart murmur    Annetta-Danlos syndrome    Myopia of both eyes    Atrial fibrillation (HCC)    SVT (supraventricular tachycardia) ([facility])    Hashimoto's disease    Bilateral optic neuritis    PCOS (polycystic ovarian syndrome)    Multiple personalities (HCC)    Diabetes ([facility])    Cervical radiculopathy    Inguinal hernia    Ataxia        Surgical History:  Past Surgical History:   Procedure Laterality Date    CT CYSTOSCOPY,INSERT URETERAL STENT Right 2/12/2024    Procedure: CYSTOSCOPY, WITH RIGHT URETEROSCOPY, WITH LITHOTRIPSY, WITH INSERTION OF RIGHT URETERAL STENT;  Surgeon: Josafat Roberson M.D.;  Location: [facility];  Service: Urology    CT CYSTO/URETERO/PYELOSCOPY, DX Right 2/12/2024    Procedure: URETEROSCOPY;  Surgeon: Josafat Roberson M.D.;  Location: SURGERY [facility];  Service: Urology    LASERTRIPSY Right 2/12/2024    Procedure: LITHOTRIPSY, USING LASER;  Surgeon: Josafat Roberson M.D.;  Location: SURGERY [facility];  Service: Urology    INGUINAL HERNIA LAPAROSCOPIC Right 07/21/2023    Procedure: LAPAROSCOPIC RIGHT INGUINAL HERNIA REPAIR WITH MESH;  Surgeon: Joe Noyola M.D.;  Location: SURGERY [facility];  Service: General    HERNIA REPAIR Right 07/21/2023    PB PERCUT FIX PBOX/NECK FEMUR FX Left 01/28/2023    Procedure: FIXATION, HIP, USING CANNULATED SCREW;  Surgeon: Noman Abdul M.D.;  Location: SURGERY [facility];  Service: Orthopedics    CT LAP,DIAGNOSTIC  ABDOMEN  02/14/2022    Procedure: LAPAROSCOPY;  Surgeon: Seamus Pisano M.D.;  Location: SURGERY SAME DAY [facility];  Service: Gynecology    OVARIAN CYSTECTOMY Right 02/14/2022    Procedure: EXCISION, CYST, OVARY;  Surgeon: Seamus Pisano M.D.;  Location: SURGERY SAME DAY [facility];  Service: Gynecology    BOWEL STIMULATOR INSERTION  03/10/2021    Procedure: INSERTION, ELECTRODE LEADS AND PULSE GENERATOR, NEUROSTIMULATOR, SACRAL - REMOVAL OF INTERSTIM WITH REPLACEMENT OF SACRAL NEUROMODULATION DEVICE;  Surgeon: Joe Noyola M.D.;  Location: SURGERY [facility];  Service: General    MUSCLE BIOPSY Right 01/26/2017    Procedure: MUSCLE BIOPSY - THIGH;  Surgeon: Isidro Vigil M.D.;  Location: SURGERY [facility];  Service:     GASTROSCOPY WITH BALLOON DILATATION N/A 01/04/2017    Procedure: GASTROSCOPY WITH DILATATION;  Surgeon: Torres Vargas M.D.;  Location: SURGERY [facility];  Service:     BOWEL STIMULATOR INSERTION  07/13/2016    Procedure: BOWEL STIMULATOR INSERTION FOR PERMANENT INTERSTIM SACRAL IMPLANT;  Surgeon: Joe Noyola M.D.;  Location: SURGERY [facility];  Service:     RECOVERY  01/27/2016    Procedure: CATH LAB EP STUDY WITH SINUS NODE MODIFICATION ABHINAV;  Surgeon: Recoveryonly Surgery;  Location: SURGERY PRE-POST PROC UNIT [facility];  Service:     OTHER CARDIAC SURGERY  01/2016    cardiac ablation    NEURO DEST FACET L/S W/IG SNGL  04/21/2015    Performed by Reza Tabor at SURGERY [facility]    LUMBAR FUSION ANTERIOR  08/21/2012    Performed by SUSIE SAWANT at SURGERY [facility]S    ALYSSA BY LAPAROSCOPY  08/29/2010    Performed by SHAYY JOHNS at [facility]    LAMINOTOMY      OTHER ABDOMINAL SURGERY      TONSILLECTOMY      tonsillectomy       Past Social Hx:   Social History     Tobacco Use    Smoking status: Never    Smokeless tobacco: Never   Vaping Use    Vaping Use: Never used   Substance Use Topics    Alcohol use: No     Alcohol/week: 0.0 oz    Drug  "use: Never     Frequency: 7.0 times per week          Problem list, medications, and allergies reviewed by myself today in Epic.     Objective:     /82   Pulse (!) 101   Temp 36.4 °C (97.5 °F)   Ht 1.626 m (5' 4\")   Wt 114 kg (252 lb)   SpO2 98%   BMI 43.26 kg/m²     Physical Exam  Vitals and nursing note reviewed.   Constitutional:       General: She is not in acute distress.     Appearance: Normal appearance. She is diaphoretic. She is not ill-appearing or toxic-appearing.      Comments: + Mildly diaphoretic   HENT:      Head: Normocephalic and atraumatic.      Mouth/Throat:      Mouth: Mucous membranes are moist.      Pharynx: Oropharynx is clear. No oropharyngeal exudate or posterior oropharyngeal erythema.   Cardiovascular:      Rate and Rhythm: Normal rate and regular rhythm.      Pulses: Normal pulses.      Heart sounds: Normal heart sounds. No murmur heard.     No friction rub. No gallop.   Pulmonary:      Breath sounds: Normal breath sounds. No stridor. No wheezing, rhonchi or rales.      Comments: + Increased work of breathing  Chest:      Chest wall: No tenderness.   Abdominal:      Tenderness: There is no right CVA tenderness or left CVA tenderness.   Musculoskeletal:      Cervical back: Neck supple. No tenderness.   Lymphadenopathy:      Cervical: No cervical adenopathy.   Skin:     General: Skin is warm.      Capillary Refill: Capillary refill takes less than 2 seconds.   Neurological:      General: No focal deficit present.      Mental Status: She is alert and oriented to person, place, and time. Mental status is at baseline.      Gait: Gait normal.   Psychiatric:         Mood and Affect: Mood normal.         Behavior: Behavior normal.         Thought Content: Thought content normal.         Judgment: Judgment normal.         Assessment/Plan:     Diagnosis and associated orders:   1. UTI symptoms  POCT Urinalysis    URINE CULTURE(NEW)      2. Asthma with acute exacerbation, unspecified " asthma severity, unspecified whether persistent               Comments/MDM:   Patient presents today for recurrent UTI symptoms.  POC UA is unremarkable here in clinic.  Urine will be sent for culture, will follow-up with the results.  She also presents today for acute asthma exacerbation.  Patient noted to be slightly diaphoretic and has increased work of breathing.  She states that she has having difficulty with breathing at home despite the use of rescue inhalers and steroids.  EMS was called, patient will be transferred to the emergency department for further evaluation and management         Please note that this dictation was created using voice recognition software. I have made a reasonable attempt to correct obvious errors, but I expect that there are errors of grammar and possibly content that I did not discover before finalizing the note.    This note was electronically signed by EDD Reyes

## 2024-04-01 NOTE — ED TRIAGE NOTES
Chief Complaint   Patient presents with    Shortness of Breath     SOB x5 days. Patient states she has had an asthma attack everyday since Thursday. Pt reports home use of nebulizer x1 today with no relief.       BIB EMS from  for the above complaint.     Patient denies home O2 use.     Patient wheeled to triage for above complaint. Patient A&Ox4, GCS 15, patient speaking in partial sentences. Increased WOB. Patient educated on triage process and encouraged to notify staff if condition worsens. Appropriate protocols ordered. Patient returned to the lobby in stable condition.

## 2024-04-02 ASSESSMENT — ENCOUNTER SYMPTOMS
FLANK PAIN: 0
FEVER: 0
COUGH: 1
SHORTNESS OF BREATH: 1
SPUTUM PRODUCTION: 0

## 2024-04-02 NOTE — DISCHARGE INSTRUCTIONS
Please continue breathing treatments as needed as home.  Your lungs sound good today, you have no oxygen concerns at this present time.  Your workup for other causes of your shortness of breath is reassuring.  Recommend given your symptoms 1 or 2 days and reassessing if worsened.  Please follow-up with your primary care doctor.  If having severe symptoms or sudden worsening please return to ER.

## 2024-04-03 LAB
BACTERIA UR CULT: NORMAL
SIGNIFICANT IND 70042: NORMAL
SITE SITE: NORMAL
SOURCE SOURCE: NORMAL

## 2024-04-09 NOTE — CARE PLAN
Problem: Safety  Goal: Will remain free from injury  Outcome: PROGRESSING AS EXPECTED  Pt calls appropriately for assistance and needs.    Problem: Knowledge Deficit  Goal: Knowledge of disease process/condition, treatment plan, diagnostic tests, and medications will improve  Outcome: PROGRESSING AS EXPECTED  Pt able to verbalize understanding of plan of care.       show

## 2024-04-11 ENCOUNTER — OFFICE VISIT (OUTPATIENT)
Dept: URGENT CARE | Facility: CLINIC | Age: 34
End: 2024-04-11
Payer: MEDICARE

## 2024-04-11 VITALS
HEART RATE: 69 BPM | RESPIRATION RATE: 16 BRPM | HEIGHT: 64 IN | WEIGHT: 254 LBS | SYSTOLIC BLOOD PRESSURE: 118 MMHG | TEMPERATURE: 97.2 F | BODY MASS INDEX: 43.36 KG/M2 | DIASTOLIC BLOOD PRESSURE: 72 MMHG | OXYGEN SATURATION: 100 %

## 2024-04-11 DIAGNOSIS — H10.31 ACUTE CONJUNCTIVITIS OF RIGHT EYE, UNSPECIFIED ACUTE CONJUNCTIVITIS TYPE: ICD-10-CM

## 2024-04-11 PROCEDURE — 99213 OFFICE O/P EST LOW 20 MIN: CPT | Performed by: PHYSICIAN ASSISTANT

## 2024-04-11 PROCEDURE — 3074F SYST BP LT 130 MM HG: CPT | Performed by: PHYSICIAN ASSISTANT

## 2024-04-11 PROCEDURE — 3078F DIAST BP <80 MM HG: CPT | Performed by: PHYSICIAN ASSISTANT

## 2024-04-11 RX ORDER — POLYMYXIN B SULFATE AND TRIMETHOPRIM 1; 10000 MG/ML; [USP'U]/ML
1 SOLUTION OPHTHALMIC EVERY 4 HOURS
Qty: 10 ML | Refills: 0 | Status: SHIPPED | OUTPATIENT
Start: 2024-04-11 | End: 2024-04-18

## 2024-04-11 ASSESSMENT — VISUAL ACUITY: OU: 1

## 2024-04-11 ASSESSMENT — ENCOUNTER SYMPTOMS
EYE REDNESS: 1
FEVER: 0
EYE PAIN: 0
COUGH: 0
EYE DISCHARGE: 1
BLURRED VISION: 1

## 2024-04-11 ASSESSMENT — FIBROSIS 4 INDEX: FIB4 SCORE: 0.59

## 2024-04-11 NOTE — PROGRESS NOTES
"Subjective:   Kristin Balderrama  is a 33 y.o. female who presents for Eye Drainage (Pt states eye was sealed shut this morning )      Eye Drainage  Associated symptoms include congestion. Pertinent negatives include no coughing or fever.   Patient presents urgent care noting this morning with onset of discharge and irritation in her right eye.  She notes persistent discharge from right eye that was purulent appearing this morning.  She states right eye was crusted closed.  She wears glasses and denies use of contacts.  She denies fevers chills but has had some mild nasal congestion.  Denies ear pain or sore throat.    Review of Systems   Constitutional:  Negative for fever.   HENT:  Positive for congestion. Negative for ear pain.    Eyes:  Positive for blurred vision (Mild intermittent when  discharge is present), discharge and redness. Negative for pain.   Respiratory:  Negative for cough.        Allergies   Allergen Reactions    Cefdinir Shortness of Breath and Itching     Tolerated 1/18/17  Tolerates ceftriaxone  Tolerated augmentin 8/2019     Depakote [Divalproex Sodium] Unspecified     Muscle spasms/muscle pain and weakness      Depakote [Divalproex Sodium]      Muscle spasms/muscle pain and weakness    Doxycycline Anaphylaxis and Vomiting     Other reaction(s): pustules/blisters  Other reaction(s): stomach pain    Montelukast      Cardiac effusion    Montelukast [Singulair] Unspecified     Cardiac effusion    Vancomycin Itching     Pt becomes flushed in face and chest.   RXN=7/10/16    Wound Dressing Adhesive Rash     By pt report-\"removes skin totally off\"    Amitriptyline Unspecified     Headaches      Amoxicillin Rash          Aripiprazole Unspecified     Headaches/muscle twitching      Aripiprazole [Abilify] Unspecified     Headaches/muscle twitching      Clindamycin Nausea         Other reaction(s): nausea stomach pain    Erythromycin Rash     .  Other reaction(s): nausea stomach pain    Flomax " "[Tamsulosin Hydrochloride] Swelling    Hydromorphone      Other reaction(s): vomiting    Levaquin Unspecified     Severe muscle cramps in legs  RXN=unknown  Other reaction(s): leg muscle cramps    Metformin Unspecified     Increased lactic acid     Other reaction(s): itching and rash/nausea vomiting    Sulfa Drugs Hives, Itching, Myalgia and Unspecified     Muscle pain and weakness    Other reaction(s): unknown    Tamsulosin Swelling     Swelling of legs    Tape Rash     Tears skin off  coban with Tegaderm tape ok intermittently  RXN=ongoing    Ampicillin Rash     Pt reports that she received a rash     Ciprofloxacin Rash          Keflex Rash     Pt states she gets a rash with this medication  Tolerates ceftriaxone  Can take with Benadryl    Levofloxacin Unspecified     Leg muscle cramps    Metronidazole Rash     \"Vision problems\"  Other reaction(s): vision problems    Penicillins Hives     Can take with Benadryl    Valproic Acid Rash     .        Objective:   /72   Pulse 69   Temp 36.2 °C (97.2 °F)   Resp 16   Ht 1.626 m (5' 4\")   Wt 115 kg (254 lb)   SpO2 100%   BMI 43.60 kg/m²     Physical Exam  Vitals and nursing note reviewed.   Constitutional:       General: She is not in acute distress.     Appearance: She is well-developed. She is not diaphoretic.   HENT:      Head: Normocephalic and atraumatic.      Right Ear: External ear normal.      Left Ear: External ear normal.      Nose: Nose normal.   Eyes:      General: Lids are normal. Vision grossly intact. No scleral icterus.        Right eye: No foreign body, discharge or hordeolum.         Left eye: No foreign body, discharge or hordeolum.      Extraocular Movements: Extraocular movements intact.      Conjunctiva/sclera:      Right eye: Right conjunctiva is injected (mildly). Exudate present. No chemosis or hemorrhage.     Left eye: Left conjunctiva is not injected. No chemosis, exudate or hemorrhage.     Pupils: Pupils are equal, round, and " reactive to light.   Pulmonary:      Effort: Pulmonary effort is normal. No respiratory distress.   Musculoskeletal:         General: Normal range of motion.      Cervical back: Neck supple.   Skin:     General: Skin is warm and dry.      Coloration: Skin is not pale.      Findings: No erythema.   Neurological:      Mental Status: She is alert and oriented to person, place, and time. She is not disoriented.   Psychiatric:         Speech: Speech normal.         Behavior: Behavior normal.         Assessment/Plan:   1. Acute conjunctivitis of right eye, unspecified acute conjunctivitis type  - polymixin-trimethoprim (POLYTRIM) 89643-8.1 UNIT/ML-% Solution; Administer 1 Drop into both eyes every 4 hours for 7 days.  Dispense: 10 mL; Refill: 0    Sent with antibiotic drops, degree of contagion reviewed.  Return to clinic with lack of resolution or progression of symptoms.      I have worn an N95 mask, gloves and eye protection for the entire encounter with this patient.     Differential diagnosis, natural history, supportive care, and indications for immediate follow-up discussed.

## 2024-04-14 ENCOUNTER — OFFICE VISIT (OUTPATIENT)
Dept: URGENT CARE | Facility: CLINIC | Age: 34
End: 2024-04-14
Payer: MEDICARE

## 2024-04-14 ENCOUNTER — APPOINTMENT (OUTPATIENT)
Dept: RADIOLOGY | Facility: IMAGING CENTER | Age: 34
End: 2024-04-14
Attending: PHYSICIAN ASSISTANT
Payer: MEDICARE

## 2024-04-14 VITALS
SYSTOLIC BLOOD PRESSURE: 142 MMHG | RESPIRATION RATE: 18 BRPM | BODY MASS INDEX: 42.85 KG/M2 | OXYGEN SATURATION: 95 % | WEIGHT: 251 LBS | HEIGHT: 64 IN | DIASTOLIC BLOOD PRESSURE: 86 MMHG | HEART RATE: 87 BPM | TEMPERATURE: 97.4 F

## 2024-04-14 DIAGNOSIS — M25.572 ACUTE LEFT ANKLE PAIN: ICD-10-CM

## 2024-04-14 DIAGNOSIS — S93.402A SPRAIN OF LEFT ANKLE, UNSPECIFIED LIGAMENT, INITIAL ENCOUNTER: ICD-10-CM

## 2024-04-14 PROCEDURE — 3077F SYST BP >= 140 MM HG: CPT | Performed by: PHYSICIAN ASSISTANT

## 2024-04-14 PROCEDURE — 3079F DIAST BP 80-89 MM HG: CPT | Performed by: PHYSICIAN ASSISTANT

## 2024-04-14 PROCEDURE — 99214 OFFICE O/P EST MOD 30 MIN: CPT | Performed by: PHYSICIAN ASSISTANT

## 2024-04-14 PROCEDURE — 73610 X-RAY EXAM OF ANKLE: CPT | Mod: TC,LT | Performed by: PHYSICIAN ASSISTANT

## 2024-04-14 ASSESSMENT — FIBROSIS 4 INDEX: FIB4 SCORE: 0.59

## 2024-04-14 ASSESSMENT — ENCOUNTER SYMPTOMS
NUMBNESS: 0
MUSCLE WEAKNESS: 0
LOSS OF SENSATION: 0
INABILITY TO BEAR WEIGHT: 0
LOSS OF MOTION: 0
TINGLING: 0

## 2024-04-14 NOTE — PROGRESS NOTES
Subjective:   Kristin Balderrama is a 33 y.o. female who presents for Ankle Injury (L ankle )        Ankle Injury   The incident occurred 12 to 24 hours ago. The incident occurred at home. The injury mechanism was an inversion injury. The pain is present in the left ankle. The quality of the pain is described as aching. The pain is moderate. The pain has been Constant since onset. Pertinent negatives include no inability to bear weight, loss of motion, loss of sensation, muscle weakness, numbness or tingling. Associated symptoms comments: Pain with weight bearing, instability- chronic. She reports no foreign bodies present.     Review of Systems   Neurological:  Negative for tingling and numbness.       PMH:  has a past medical history of Abdominal pain, Anginal syndrome, Apnea, sleep, Arrhythmia, Arthritis, ASTHMA, Back pain, Borderline personality disorder (Formerly [facility]), Breath shortness, Bronchitis (02/08/2022), Cardiac arrhythmia, Chickenpox, Chronic UTI (09/18/2010), Cough, Daytime sleepiness, Dental disorder (03/08/2021), Depression, Diabetes ([facility]), Diarrhea, Disorder of thyroid, Fall, Fatigue, Frequent headaches, Gasping for breath, Gynecological disorder, Headache(784.0), Heart burn, Heart murmur, History of falling, Indigestion, Migraine, Mitochondrial disease (Formerly [facility]), 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  MEDS:   Current Outpatient Medications:     polymixin-trimethoprim (POLYTRIM) 28625-4.1 UNIT/ML-% Solution, Administer 1 Drop into  both eyes every 4 hours for 7 days., Disp: 10 mL, Rfl: 0    methylPREDNISolone (MEDROL DOSEPAK) 4 MG Tablet Therapy Pack, Follow schedule on package instructions., Disp: 21 Tablet, Rfl: 0    BORIC ACID VAGINAL VA, Insert 1 Suppository into the vagina as needed (For yeast). (OTC), Disp: , Rfl:     ondansetron (ZOFRAN ODT) 4 MG TABLET DISPERSIBLE, Take 1 Tablet by mouth every 6 hours as needed for Nausea/Vomiting., Disp: 10 Tablet, Rfl: 0    adalimumab (HUMIRA) 80 MG/0.8ML injection, Inject 80 mg under the skin every 14 days. Last injection was on 3/14/2024, Disp: , Rfl:     ivabradine (CORLANOR) 7.5 MG Tab tablet, Take 1 Tablet by mouth 2 times a day with meals., Disp: 60 Tablet, Rfl: 11    amLODIPine (NORVASC) 5 MG Tab, Take 5 mg by mouth every day., Disp: , Rfl:     Rimegepant Sulfate (NURTEC) 75 MG TABLET DISPERSIBLE, Take 75 mg by mouth 1 time a day as needed (migraine)., Disp: , Rfl:     gabapentin (NEURONTIN) 400 MG Cap, Take 1,200 mg by mouth 3 times a day. 3 capsules=1200mg, Disp: , Rfl:     acetaminophen (TYLENOL) 500 MG Tab, Take 1,000 mg by mouth 1 time a day as needed for Mild Pain. Indications: Pain, Disp: , Rfl:     BORIC ACID VAGINAL VA, Insert 1 Suppository into the vagina 1 time a day as needed (yeast infection)., Disp: , Rfl:     docusate sodium 250 MG Cap, Take 250 mg by mouth 2 times a day., Disp: , Rfl:     sodium chloride (SALT) 1 GM Tab, TAKE 1 TABLET BY MOUTH THREE TIMES A DAY WITH MEALS ( NOT COVERED/INS ) (Patient taking differently: Take 1 g by mouth 3 times a day.), Disp: 90 Tablet, Rfl: 2    omeprazole (PRILOSEC) 20 MG delayed-release capsule, Take 20 mg by mouth 2 times a day., Disp: , Rfl:     lamoTRIgine (LAMICTAL) 25 MG Tab, Take 50 mg by mouth 2 times a day. 50 mg = 2 tablets, Disp: , Rfl:     Galcanezumab-gnlm (EMGALITY) 120 MG/ML Solution Auto-injector, Inject 120 mg under the skin every 30 (thirty) days. On the 27th of every month, last dose was taken on 2/27/2024, Disp: , Rfl:     " topiramate (TOPAMAX) 50 MG tablet, Take 50 mg by mouth 2 times a day., Disp: , Rfl:     metoprolol SR (TOPROL XL) 25 MG TABLET SR 24 HR, Take 1 Tablet by mouth every day., Disp: 90 Tablet, Rfl: 3    tizanidine (ZANAFLEX) 4 MG Tab, Take 4 mg by mouth 2 times a day., Disp: , Rfl:     ziprasidone (GEODON) 40 MG Cap, Take 1 capsule by mouth at bedtime., Disp: 30 Capsule, Rfl: 2    diphenhydrAMINE (BENADRYL) 25 MG Tab, Take 25-50 mg by mouth 2 times a day. 25 mg in the morning, 50 mg in the evening, Disp: , Rfl:     Melatonin 10 MG Tab, Take 10 mg by mouth at bedtime., Disp: , Rfl:     etonogestrel (NEXPLANON) 68 MG Implant implant, Inject 68 mg under the skin see administration instructions. Pt reports she is not sure when she had this medications injected last, pt reports she still has it in her arm Every 3 years to change, thinks it was placed 2021, Disp: , Rfl:   ALLERGIES:   Allergies   Allergen Reactions    Cefdinir Shortness of Breath and Itching     Tolerated 1/18/17  Tolerates ceftriaxone  Tolerated augmentin 8/2019     Depakote [Divalproex Sodium] Unspecified     Muscle spasms/muscle pain and weakness      Depakote [Divalproex Sodium]      Muscle spasms/muscle pain and weakness    Doxycycline Anaphylaxis and Vomiting     Other reaction(s): pustules/blisters  Other reaction(s): stomach pain    Montelukast      Cardiac effusion    Montelukast [Singulair] Unspecified     Cardiac effusion    Vancomycin Itching     Pt becomes flushed in face and chest.   RXN=7/10/16    Wound Dressing Adhesive Rash     By pt report-\"removes skin totally off\"    Amitriptyline Unspecified     Headaches      Amoxicillin Rash          Aripiprazole Unspecified     Headaches/muscle twitching      Aripiprazole [Abilify] Unspecified     Headaches/muscle twitching      Clindamycin Nausea         Other reaction(s): nausea stomach pain    Erythromycin Rash     .  Other reaction(s): nausea stomach pain    Flomax [Tamsulosin Hydrochloride] " "Swelling    Hydromorphone      Other reaction(s): vomiting    Levaquin Unspecified     Severe muscle cramps in legs  RXN=unknown  Other reaction(s): leg muscle cramps    Metformin Unspecified     Increased lactic acid     Other reaction(s): itching and rash/nausea vomiting    Sulfa Drugs Hives, Itching, Myalgia and Unspecified     Muscle pain and weakness    Other reaction(s): unknown    Tamsulosin Swelling     Swelling of legs    Tape Rash     Tears skin off  coban with Tegaderm tape ok intermittently  RXN=ongoing    Ampicillin Rash     Pt reports that she received a rash     Ciprofloxacin Rash          Keflex Rash     Pt states she gets a rash with this medication  Tolerates ceftriaxone  Can take with Benadryl    Levofloxacin Unspecified     Leg muscle cramps    Metronidazole Rash     \"Vision problems\"  Other reaction(s): vision problems    Penicillins Hives     Can take with Benadryl    Valproic Acid Rash     .     SURGHX:   Past Surgical History:   Procedure Laterality Date    NV CYSTOSCOPY,INSERT URETERAL STENT Right 2/12/2024    Procedure: CYSTOSCOPY, WITH RIGHT URETEROSCOPY, WITH LITHOTRIPSY, WITH INSERTION OF RIGHT URETERAL STENT;  Surgeon: Josafat Roberson M.D.;  Location: [facility];  Service: Urology    NV CYSTO/URETERO/PYELOSCOPY, DX Right 2/12/2024    Procedure: URETEROSCOPY;  Surgeon: Josafat Roberson M.D.;  Location: [facility];  Service: Urology    LASERTRIPSY Right 2/12/2024    Procedure: LITHOTRIPSY, USING LASER;  Surgeon: Josafat Roberson M.D.;  Location: [facility];  Service: Urology    INGUINAL HERNIA LAPAROSCOPIC Right 07/21/2023    Procedure: LAPAROSCOPIC RIGHT INGUINAL HERNIA REPAIR WITH MESH;  Surgeon: Joe Noyola M.D.;  Location: [facility];  Service: General    HERNIA REPAIR Right 07/21/2023    PB PERCUT FIX PBOX/NECK FEMUR FX Left 01/28/2023    Procedure: FIXATION, HIP, USING CANNULATED SCREW;  Surgeon: Noman Abdul M.D.;  Location: [facility] [place];  Service: Orthopedics    GA LAP,DIAGNOSTIC ABDOMEN  02/14/2022    Procedure: LAPAROSCOPY;  Surgeon: Seamus Pisano M.D.;  Location: SURGERY SAME DAY [facility];  Service: Gynecology    OVARIAN CYSTECTOMY Right 02/14/2022    Procedure: EXCISION, CYST, OVARY;  Surgeon: Seamus Pisano M.D.;  Location: SURGERY SAME DAY [facility];  Service: Gynecology    BOWEL STIMULATOR INSERTION  03/10/2021    Procedure: INSERTION, ELECTRODE LEADS AND PULSE GENERATOR, NEUROSTIMULATOR, SACRAL - REMOVAL OF INTERSTIM WITH REPLACEMENT OF SACRAL NEUROMODULATION DEVICE;  Surgeon: Joe Noyola M.D.;  Location: SURGERY [facility];  Service: General    MUSCLE BIOPSY Right 01/26/2017    Procedure: MUSCLE BIOPSY - THIGH;  Surgeon: Isidro Vigil M.D.;  Location: [facility];  Service:     GASTROSCOPY WITH BALLOON DILATATION N/A 01/04/2017    Procedure: GASTROSCOPY WITH DILATATION;  Surgeon: Torres Vargas M.D.;  Location: [facility];  Service:     BOWEL STIMULATOR INSERTION  07/13/2016    Procedure: BOWEL STIMULATOR INSERTION FOR PERMANENT INTERSTIM SACRAL IMPLANT;  Surgeon: Joe Noyola M.D.;  Location: SURGERY [facility];  Service:     RECOVERY  01/27/2016    Procedure: CATH LAB EP STUDY WITH SINUS NODE MODIFICATION LA;  Surgeon: Recoveryonly Surgery;  [place] [facility] [place]  Service:     OTHER CARDIAC SURGERY  01/2016    cardiac ablation    NEURO DEST FACET L/S W/IG SNGL  04/21/2015    Performed by Reza Tabor at SURGERY [facility]    LUMBAR FUSION ANTERIOR  08/21/2012    Performed by SUSIE SAWANT at SURGERY [facility] ORS    ALYSSA BY LAPAROSCOPY  08/29/2010    Performed by SHAYY JOHNS at [facility]    LAMINOTOMY      OTHER ABDOMINAL SURGERY      TONSILLECTOMY      tonsillectomy     SOCHX:  reports that she has never smoked. She has never used smokeless tobacco. She reports that she does not drink alcohol and does not use drugs.  FH: Family history  "was reviewed, no pertinent findings to report   Objective:   BP (!) 142/86   Pulse 87   Temp 36.3 °C (97.4 °F) (Temporal)   Resp 18   Ht 1.626 m (5' 4\")   Wt 114 kg (251 lb)   SpO2 95%   BMI 43.08 kg/m²   Physical Exam  Vitals reviewed.   Constitutional:       General: She is not in acute distress.     Appearance: Normal appearance. She is well-developed. She is not toxic-appearing.   HENT:      Head: Normocephalic and atraumatic.      Right Ear: External ear normal.      Left Ear: External ear normal.      Nose: Nose normal.   Cardiovascular:      Rate and Rhythm: Normal rate and regular rhythm.   Pulmonary:      Effort: Pulmonary effort is normal. No respiratory distress.      Breath sounds: No stridor.   Musculoskeletal:      Comments: Left ankle and foot: Skin is intact and atraumatic.  Mild edema over lateral ankle.  Patient tender to palpation over ATFL, CFL and lateral malleolus, medial malleolus, deltoid ligament, base fifth metatarsal, midfoot, forefoot, digits nontender to palpation.  Distal sensation is intact.  Dorsalis pedis and posterior tibial pulses 2+.   Skin:     General: Skin is dry.   Neurological:      Comments: Alert and oriented.    Psychiatric:         Speech: Speech normal.         Behavior: Behavior normal.             Imaging:       FINDINGS:     BONE MINERALIZATION: Normal.  JOINTS: Preserved. No erosions.  FRACTURE: None.  DISLOCATION: None.  SOFT TISSUES: Ankle swelling.  Plantar calcaneal spur.     IMPRESSION:     No acute osseous abnormality.  Assessment/Plan:   1. Sprain of left ankle, unspecified ligament, initial encounter    2. Acute left ankle pain  - DX-ANKLE 3+ VIEWS LEFT; Future    No evidence of fracture on imaging.  Patient advised symptoms most likely secondary to a sprain.  Patient fitted with a gel cast and may bear weight as tolerated.  Elevate and ice as needed.  Recommend follow-up with primary care in approximately 1 week for reevaluation and further " management.

## 2024-04-15 ENCOUNTER — APPOINTMENT (OUTPATIENT)
Dept: RADIOLOGY | Facility: MEDICAL CENTER | Age: 34
End: 2024-04-15
Attending: EMERGENCY MEDICINE
Payer: MEDICARE

## 2024-04-15 ENCOUNTER — HOSPITAL ENCOUNTER (EMERGENCY)
Facility: MEDICAL CENTER | Age: 34
End: 2024-04-15
Attending: EMERGENCY MEDICINE
Payer: MEDICARE

## 2024-04-15 VITALS
BODY MASS INDEX: 43.19 KG/M2 | HEART RATE: 96 BPM | SYSTOLIC BLOOD PRESSURE: 141 MMHG | OXYGEN SATURATION: 98 % | TEMPERATURE: 98 F | HEIGHT: 64 IN | WEIGHT: 253 LBS | DIASTOLIC BLOOD PRESSURE: 75 MMHG | RESPIRATION RATE: 20 BRPM

## 2024-04-15 DIAGNOSIS — R19.7 DIARRHEA, UNSPECIFIED TYPE: ICD-10-CM

## 2024-04-15 DIAGNOSIS — R10.9 ACUTE ABDOMINAL PAIN: ICD-10-CM

## 2024-04-15 DIAGNOSIS — R16.2 HEPATOSPLENOMEGALY: ICD-10-CM

## 2024-04-15 LAB
ALBUMIN SERPL BCP-MCNC: 5.1 G/DL (ref 3.2–4.9)
ALBUMIN/GLOB SERPL: 2.1 G/DL
ALP SERPL-CCNC: 76 U/L (ref 30–99)
ALT SERPL-CCNC: 24 U/L (ref 2–50)
ANION GAP SERPL CALC-SCNC: 20 MMOL/L (ref 7–16)
AST SERPL-CCNC: 21 U/L (ref 12–45)
BASOPHILS # BLD AUTO: 0.1 % (ref 0–1.8)
BASOPHILS # BLD: 0.01 K/UL (ref 0–0.12)
BILIRUB SERPL-MCNC: 0.6 MG/DL (ref 0.1–1.5)
BUN SERPL-MCNC: 8 MG/DL (ref 8–22)
CALCIUM ALBUM COR SERPL-MCNC: 8.9 MG/DL (ref 8.5–10.5)
CALCIUM SERPL-MCNC: 9.8 MG/DL (ref 8.5–10.5)
CHLORIDE SERPL-SCNC: 109 MMOL/L (ref 96–112)
CO2 SERPL-SCNC: 12 MMOL/L (ref 20–33)
CREAT SERPL-MCNC: 0.85 MG/DL (ref 0.5–1.4)
EOSINOPHIL # BLD AUTO: 0.01 K/UL (ref 0–0.51)
EOSINOPHIL NFR BLD: 0.1 % (ref 0–6.9)
ERYTHROCYTE [DISTWIDTH] IN BLOOD BY AUTOMATED COUNT: 40.2 FL (ref 35.9–50)
GFR SERPLBLD CREATININE-BSD FMLA CKD-EPI: 92 ML/MIN/1.73 M 2
GLOBULIN SER CALC-MCNC: 2.4 G/DL (ref 1.9–3.5)
GLUCOSE SERPL-MCNC: 174 MG/DL (ref 65–99)
HCG SERPL QL: NEGATIVE
HCT VFR BLD AUTO: 45.4 % (ref 37–47)
HGB BLD-MCNC: 15.9 G/DL (ref 12–16)
IMM GRANULOCYTES # BLD AUTO: 0.03 K/UL (ref 0–0.11)
IMM GRANULOCYTES NFR BLD AUTO: 0.4 % (ref 0–0.9)
LIPASE SERPL-CCNC: 22 U/L (ref 11–82)
LYMPHOCYTES # BLD AUTO: 0.62 K/UL (ref 1–4.8)
LYMPHOCYTES NFR BLD: 9.2 % (ref 22–41)
MCH RBC QN AUTO: 31.2 PG (ref 27–33)
MCHC RBC AUTO-ENTMCNC: 35 G/DL (ref 32.2–35.5)
MCV RBC AUTO: 89.2 FL (ref 81.4–97.8)
MONOCYTES # BLD AUTO: 0.07 K/UL (ref 0–0.85)
MONOCYTES NFR BLD AUTO: 1 % (ref 0–13.4)
NEUTROPHILS # BLD AUTO: 5.97 K/UL (ref 1.82–7.42)
NEUTROPHILS NFR BLD: 89.2 % (ref 44–72)
NRBC # BLD AUTO: 0 K/UL
NRBC BLD-RTO: 0 /100 WBC (ref 0–0.2)
PLATELET # BLD AUTO: 189 K/UL (ref 164–446)
PMV BLD AUTO: 11.4 FL (ref 9–12.9)
POTASSIUM SERPL-SCNC: 3.8 MMOL/L (ref 3.6–5.5)
PROT SERPL-MCNC: 7.5 G/DL (ref 6–8.2)
RBC # BLD AUTO: 5.09 M/UL (ref 4.2–5.4)
SODIUM SERPL-SCNC: 141 MMOL/L (ref 135–145)
WBC # BLD AUTO: 6.7 K/UL (ref 4.8–10.8)

## 2024-04-15 PROCEDURE — 36415 COLL VENOUS BLD VENIPUNCTURE: CPT

## 2024-04-15 PROCEDURE — 96374 THER/PROPH/DIAG INJ IV PUSH: CPT

## 2024-04-15 PROCEDURE — 700105 HCHG RX REV CODE 258: Mod: UD | Performed by: EMERGENCY MEDICINE

## 2024-04-15 PROCEDURE — 85025 COMPLETE CBC W/AUTO DIFF WBC: CPT

## 2024-04-15 PROCEDURE — 99284 EMERGENCY DEPT VISIT MOD MDM: CPT

## 2024-04-15 PROCEDURE — 700111 HCHG RX REV CODE 636 W/ 250 OVERRIDE (IP): Mod: JZ,UD | Performed by: EMERGENCY MEDICINE

## 2024-04-15 PROCEDURE — 74177 CT ABD & PELVIS W/CONTRAST: CPT

## 2024-04-15 PROCEDURE — 96375 TX/PRO/DX INJ NEW DRUG ADDON: CPT

## 2024-04-15 PROCEDURE — 80053 COMPREHEN METABOLIC PANEL: CPT

## 2024-04-15 PROCEDURE — 83690 ASSAY OF LIPASE: CPT

## 2024-04-15 PROCEDURE — 700117 HCHG RX CONTRAST REV CODE 255: Mod: UD | Performed by: EMERGENCY MEDICINE

## 2024-04-15 PROCEDURE — 84703 CHORIONIC GONADOTROPIN ASSAY: CPT

## 2024-04-15 RX ORDER — ONDANSETRON 2 MG/ML
4 INJECTION INTRAMUSCULAR; INTRAVENOUS ONCE
Status: COMPLETED | OUTPATIENT
Start: 2024-04-15 | End: 2024-04-15

## 2024-04-15 RX ORDER — SODIUM CHLORIDE, SODIUM LACTATE, POTASSIUM CHLORIDE, CALCIUM CHLORIDE 600; 310; 30; 20 MG/100ML; MG/100ML; MG/100ML; MG/100ML
1000 INJECTION, SOLUTION INTRAVENOUS ONCE
Status: COMPLETED | OUTPATIENT
Start: 2024-04-15 | End: 2024-04-15

## 2024-04-15 RX ORDER — MORPHINE SULFATE 4 MG/ML
8 INJECTION INTRAVENOUS ONCE
Status: COMPLETED | OUTPATIENT
Start: 2024-04-15 | End: 2024-04-15

## 2024-04-15 RX ORDER — LOPERAMIDE HYDROCHLORIDE 2 MG/1
2 CAPSULE ORAL 4 TIMES DAILY PRN
Qty: 30 CAPSULE | Refills: 0 | Status: SHIPPED | OUTPATIENT
Start: 2024-04-15

## 2024-04-15 RX ADMIN — ONDANSETRON 4 MG: 2 INJECTION INTRAMUSCULAR; INTRAVENOUS at 19:18

## 2024-04-15 RX ADMIN — SODIUM CHLORIDE, POTASSIUM CHLORIDE, SODIUM LACTATE AND CALCIUM CHLORIDE 1000 ML: 600; 310; 30; 20 INJECTION, SOLUTION INTRAVENOUS at 18:44

## 2024-04-15 RX ADMIN — MORPHINE SULFATE 8 MG: 4 INJECTION INTRAVENOUS at 19:18

## 2024-04-15 RX ADMIN — IOHEXOL 100 ML: 350 INJECTION, SOLUTION INTRAVENOUS at 19:12

## 2024-04-15 ASSESSMENT — FIBROSIS 4 INDEX: FIB4 SCORE: 0.59

## 2024-04-15 ASSESSMENT — PAIN DESCRIPTION - PAIN TYPE: TYPE: ACUTE PAIN

## 2024-04-15 NOTE — ED TRIAGE NOTES
Kristin Balderrama  33 y.o.  female  Chief Complaint   Patient presents with    Diarrhea     Pt c/o watery, foul smelling diarrhea since Friday, every 2-3 hours. States she can hardly make it to the bathroom. No blood in stool. No recent abx. No fevers. C/o lob abdominal bloating - reports mildly uncomfortable.      Pt reports she went to [facility] for similar complaint of Saturday & received fluids. Labs & UA ordered. Patient educated on triage process, to alert staff if any changes in condition.

## 2024-04-16 NOTE — DISCHARGE INSTRUCTIONS
Please discuss the following findings with your regular doctor:  CT-ABDOMEN-PELVIS WITH   Final Result         1. Hepatosplenomegaly.   2. No evidence for obstructive uropathy.   3. Postoperative changes as detailed above.   4. No evidence for bowel obstruction, diverticulitis, or appendicitis.        Labs Reviewed   CBC WITH DIFFERENTIAL - Abnormal; Notable for the following components:       Result Value    Neutrophils-Polys 89.20 (*)     Lymphocytes 9.20 (*)     Lymphs (Absolute) 0.62 (*)     All other components within normal limits   COMP METABOLIC PANEL - Abnormal; Notable for the following components:    Co2 12 (*)     Anion Gap 20.0 (*)     Glucose 174 (*)     Albumin 5.1 (*)     All other components within normal limits   LIPASE   HCG QUAL SERUM   ESTIMATED GFR   URINALYSIS,CULTURE IF INDICATED

## 2024-04-16 NOTE — ED PROVIDER NOTES
"ER Provider Note    Scribed for Jimmy Abbott MD by Barbara Caldera. 4/15/2024  5:31 PM    Primary Care Provider: Torres Brody M.D.    CHIEF COMPLAINT   Chief Complaint   Patient presents with    Diarrhea     Pt c/o watery, foul smelling diarrhea since Friday, every 2-3 hours. States she can hardly make it to the bathroom. No blood in stool. No recent abx. No fevers. C/o lob abdominal bloating - reports mildly uncomfortable.      EXTERNAL RECORDS REVIEWED  Inpatient Notes from recent vomiting on 3/14      HPI/ROS  LIMITATION TO HISTORY   Select: : None  OUTSIDE HISTORIAN(S):  None    Kristin Balderrama is a 33 y.o. female who presents to the ED complaining of diarrhea onset four days ago. Patient was supposed to get surgery at [facility] with Dr. Preston but she was having diarrhea and her abdomen was inflamed. The diarrhea was green and foul-smelling. The surgery was to fix a gastric issue. She had a biopsy today instead and did not have any preparation. Patient has associated symptoms of nausea and left-sided abdominal pain but denies vomiting, fever, or rash. Patient does not have an IV. Patient has a history of C. Diff.     PAST MEDICAL HISTORY  Past Medical History:   Diagnosis Date    Abdominal pain     Anginal syndrome     random chest pain especially with tachycardia    Apnea, sleep     Arrhythmia     \"sinus tachycardia\", cariologist, Dr. Kumar; ablation 2/2016    Arthritis     osteo    ASTHMA     when around smoke    Back pain     Borderline personality disorder (HCC)     Breath shortness     with tachycardia    Bronchitis 02/08/2022    Last time was 12/21    Cardiac arrhythmia     Chickenpox     Chronic UTI 09/18/2010    Cough     Daytime sleepiness     Dental disorder 03/08/2021    infected tooth    Depression     Diabetes (HCC)     Diarrhea     incontinece    Disorder of thyroid     Hashimoto's    Fall     Fatigue     Frequent headaches     Gasping for breath     Gynecological disorder     PCOS    " "Headache(784.0)     Heart burn     Heart murmur     History of falling     Indigestion     Migraine     Mitochondrial disease (HCC)     Multiple personality disorder (HCC)     Nausea     Obesity     Other fatigue 06/29/2020    Pain 08/15/2012    back, 7/10    Painful joint     PCOS (polycystic ovarian syndrome)     Pneumonia 2012 12/2020    POTS (postural orthostatic tachycardia syndrome)     Psychosis (HCC)     Ringing in ears     Scoliosis     Shortness of breath     O2 as needed    Sinus tachycardia 10/31/2013    Sleep apnea     CPAP \"pulmonary doctor took me off mid year 2016\"    Snoring     Supraventricular tachycardia (HCC) 4/10/2019    Tonsillitis     Transverse myelitis (HCC)     2/8/22: Per pt: not anymore    Tuberculosis     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    Urinary bladder disorder     Suprapubic cath. 2/8/22: Not anymore.     Urinary incontinence     Weakness     Wears glasses      SURGICAL HISTORY  Past Surgical History:   Procedure Laterality Date    IL CYSTOSCOPY,INSERT URETERAL STENT Right 2/12/2024    Procedure: CYSTOSCOPY, WITH RIGHT URETEROSCOPY, WITH LITHOTRIPSY, WITH INSERTION OF RIGHT URETERAL STENT;  Surgeon: Josafat Roberson M.D.;  Location: [facility];  Service: Urology    IL CYSTO/URETERO/PYELOSCOPY, DX Right 2/12/2024    Procedure: URETEROSCOPY;  Surgeon: Josafat Roberson M.D.;  Location: [facility];  Service: Urology    LASERTRIPSY Right 2/12/2024    Procedure: LITHOTRIPSY, USING LASER;  Surgeon: Josafat Roberson M.D.;  Location: [facility];  Service: Urology    INGUINAL HERNIA LAPAROSCOPIC Right 07/21/2023    Procedure: LAPAROSCOPIC RIGHT INGUINAL HERNIA REPAIR WITH MESH;  Surgeon: Joe Noyola M.D.;  Location: [facility];  Service: General    HERNIA REPAIR Right 07/21/2023    PB PERCUT FIX PBOX/NECK FEMUR FX Left 01/28/2023    Procedure: FIXATION, HIP, USING CANNULATED SCREW;  Surgeon: Noman Abdul M.D.;  Location: [facility] [place];  Service: Orthopedics    OH LAP,DIAGNOSTIC ABDOMEN  02/14/2022    Procedure: LAPAROSCOPY;  Surgeon: Seamus Pisano M.D.;  Location: SURGERY SAME DAY [facility];  Service: Gynecology    OVARIAN CYSTECTOMY Right 02/14/2022    Procedure: EXCISION, CYST, OVARY;  Surgeon: Seamus Pisano M.D.;  Location: SURGERY SAME DAY [facility];  Service: Gynecology    BOWEL STIMULATOR INSERTION  03/10/2021    Procedure: INSERTION, ELECTRODE LEADS AND PULSE GENERATOR, NEUROSTIMULATOR, SACRAL - REMOVAL OF INTERSTIM WITH REPLACEMENT OF SACRAL NEUROMODULATION DEVICE;  Surgeon: Joe Noyola M.D.;  Location: SURGERY [facility];  Service: General    MUSCLE BIOPSY Right 01/26/2017    Procedure: MUSCLE BIOPSY - THIGH;  Surgeon: Isidro Vigil M.D.;  Location: [facility];  Service:     GASTROSCOPY WITH BALLOON DILATATION N/A 01/04/2017    Procedure: GASTROSCOPY WITH DILATATION;  Surgeon: Torres Vargas M.D.;  Location: [facility];  Service:     BOWEL STIMULATOR INSERTION  07/13/2016    Procedure: BOWEL STIMULATOR INSERTION FOR PERMANENT INTERSTIM SACRAL IMPLANT;  Surgeon: Joe Noyola M.D.;  Location: SURGERY [facility];  Service:     RECOVERY  01/27/2016    Procedure: CATH LAB EP STUDY WITH SINUS NODE MODIFICATION ABHINAV;  Surgeon: [facility];  Location: SURGERY PRE-POST PROC UNIT [facility];  Service:     OTHER CARDIAC SURGERY  01/2016    cardiac ablation    NEURO DEST FACET L/S W/IG SNGL  04/21/2015    Performed by Reza Tabor at SUR[facility]ERY [facility]    LUMBAR FUSION ANTERIOR  08/21/2012    Performed by USSIE SAWANT at SURGERY [facility]    ALYSSA BY LAPAROSCOPY  08/29/2010    Performed by SHAYY JOHNS at [facility]    LAMINOTOMY      OTHER ABDOMINAL SURGERY      TONSILLECTOMY      tonsillectomy     FAMILY HISTORY  Family History   Problem Relation Age of Onset    Hypertension Mother     Sleep Apnea Mother     Heart Disease Mother     Other Mother         hypothryod     Hypertension Maternal Uncle     Heart Disease Maternal Grandmother     Hypertension Maternal Grandmother     No Known Problems Sister     Other Sister         Narcolepsy;fibromyalsia;bone;nerve    Genitourinary () Problems Sister         endometriosis     SOCIAL HISTORY   reports that she has never smoked. She has never used smokeless tobacco. She reports that she does not drink alcohol and does not use drugs.    CURRENT MEDICATIONS  Previous Medications    ACETAMINOPHEN (TYLENOL) 500 MG TAB    Take 1,000 mg by mouth 1 time a day as needed for Mild Pain. Indications: Pain    ADALIMUMAB (HUMIRA) 80 MG/0.8ML INJECTION    Inject 80 mg under the skin every 14 days. Last injection was on 3/14/2024    AMLODIPINE (NORVASC) 5 MG TAB    Take 5 mg by mouth every day.    BORIC ACID VAGINAL VA    Insert 1 Suppository into the vagina 1 time a day as needed (yeast infection).    BORIC ACID VAGINAL VA    Insert 1 Suppository into the vagina as needed (For yeast). (OTC)    DIPHENHYDRAMINE (BENADRYL) 25 MG TAB    Take 25-50 mg by mouth 2 times a day. 25 mg in the morning, 50 mg in the evening    DOCUSATE SODIUM 250 MG CAP    Take 250 mg by mouth 2 times a day.    ETONOGESTREL (NEXPLANON) 68 MG IMPLANT IMPLANT    Inject 68 mg under the skin see administration instructions. Pt reports she is not sure when she had this medications injected last, pt reports she still has it in her arm  Every 3 years to change, thinks it was placed 2021    GABAPENTIN (NEURONTIN) 400 MG CAP    Take 1,200 mg by mouth 3 times a day. 3 capsules=1200mg    GALCANEZUMAB-GNLM (EMGALITY) 120 MG/ML SOLUTION AUTO-INJECTOR    Inject 120 mg under the skin every 30 (thirty) days. On the 27th of every month, last dose was taken on 2/27/2024    IVABRADINE (CORLANOR) 7.5 MG TAB TABLET    Take 1 Tablet by mouth 2 times a day with meals.    LAMOTRIGINE (LAMICTAL) 25 MG TAB    Take 50 mg by mouth 2 times a day. 50 mg = 2 tablets    MELATONIN 10 MG TAB    Take 10 mg by  "mouth at bedtime.    METHYLPREDNISOLONE (MEDROL DOSEPAK) 4 MG TABLET THERAPY PACK    Follow schedule on package instructions.    METOPROLOL SR (TOPROL XL) 25 MG TABLET SR 24 HR    Take 1 Tablet by mouth every day.    OMEPRAZOLE (PRILOSEC) 20 MG DELAYED-RELEASE CAPSULE    Take 20 mg by mouth 2 times a day.    ONDANSETRON (ZOFRAN ODT) 4 MG TABLET DISPERSIBLE    Take 1 Tablet by mouth every 6 hours as needed for Nausea/Vomiting.    POLYMIXIN-TRIMETHOPRIM (POLYTRIM) 71375-2.1 UNIT/ML-% SOLUTION    Administer 1 Drop into both eyes every 4 hours for 7 days.    RIMEGEPANT SULFATE (NURTEC) 75 MG TABLET DISPERSIBLE    Take 75 mg by mouth 1 time a day as needed (migraine).    SODIUM CHLORIDE (SALT) 1 GM TAB    TAKE 1 TABLET BY MOUTH THREE TIMES A DAY WITH MEALS ( NOT COVERED/INS )    TIZANIDINE (ZANAFLEX) 4 MG TAB    Take 4 mg by mouth 2 times a day.    TOPIRAMATE (TOPAMAX) 50 MG TABLET    Take 50 mg by mouth 2 times a day.    ZIPRASIDONE (GEODON) 40 MG CAP    Take 1 capsule by mouth at bedtime.     ALLERGIES  Cefdinir, Depakote [divalproex sodium], Depakote [divalproex sodium], Doxycycline, Montelukast, Montelukast [singulair], Vancomycin, Wound dressing adhesive, Amitriptyline, Amoxicillin, Aripiprazole, Aripiprazole [abilify], Clindamycin, Erythromycin, Flomax [tamsulosin hydrochloride], Hydromorphone, Levaquin, Metformin, Sulfa drugs, Tamsulosin, Tape, Ampicillin, Ciprofloxacin, Keflex, Levofloxacin, Metronidazole, Penicillins, and Valproic acid    PHYSICAL EXAM  BP (!) 155/119   Pulse (!) 105   Temp 36.4 °C (97.6 °F) (Temporal)   Resp 18   Ht 1.626 m (5' 4\")   Wt 115 kg (253 lb)   SpO2 97%   BMI 43.43 kg/m²       Constitutional: Alert. Uncomfortable appearing.  HENT: No signs of trauma, Bilateral external ears normal, Nose normal. Uvula midline.   Eyes: Pupils are equal and reactive, Conjunctiva normal, Non-icteric.   Neck: Normal range of motion, No tenderness, Supple, No stridor.   Lymphatic: No lymphadenopathy " noted.   Cardiovascular: Regular rate and rhythm, no murmurs.   Thorax & Lungs: Normal breath sounds, No respiratory distress, No wheezing, No chest tenderness.   Abdomen: Bowel sounds normal, Soft, No tenderness, No peritoneal signs, No masses, No pulsatile masses. LLQ is tender to palpation.  Skin: Warm, Dry, No erythema, No rash.   Back: No bony tenderness, No CVA tenderness.   Extremities: Intact distal pulses, No edema, No tenderness, No cyanosis.  Musculoskeletal: Good range of motion in all major joints. No tenderness to palpation or major deformities noted.   Neurologic: Alert , Normal motor function, Normal sensory function, No focal deficits noted.   Psychiatric: Affect normal, Judgment normal, Mood normal.     DIAGNOSTIC STUDIES    EKG/LABS  Results for orders placed or performed during the hospital encounter of 04/15/24   CBC WITH DIFFERENTIAL   Result Value Ref Range    WBC 6.7 4.8 - 10.8 K/uL    RBC 5.09 4.20 - 5.40 M/uL    Hemoglobin 15.9 12.0 - 16.0 g/dL    Hematocrit 45.4 37.0 - 47.0 %    MCV 89.2 81.4 - 97.8 fL    MCH 31.2 27.0 - 33.0 pg    MCHC 35.0 32.2 - 35.5 g/dL    RDW 40.2 35.9 - 50.0 fL    Platelet Count 189 164 - 446 K/uL    MPV 11.4 9.0 - 12.9 fL    Neutrophils-Polys 89.20 (H) 44.00 - 72.00 %    Lymphocytes 9.20 (L) 22.00 - 41.00 %    Monocytes 1.00 0.00 - 13.40 %    Eosinophils 0.10 0.00 - 6.90 %    Basophils 0.10 0.00 - 1.80 %    Immature Granulocytes 0.40 0.00 - 0.90 %    Nucleated RBC 0.00 0.00 - 0.20 /100 WBC    Neutrophils (Absolute) 5.97 1.82 - 7.42 K/uL    Lymphs (Absolute) 0.62 (L) 1.00 - 4.80 K/uL    Monos (Absolute) 0.07 0.00 - 0.85 K/uL    Eos (Absolute) 0.01 0.00 - 0.51 K/uL    Baso (Absolute) 0.01 0.00 - 0.12 K/uL    Immature Granulocytes (abs) 0.03 0.00 - 0.11 K/uL    NRBC (Absolute) 0.00 K/uL   COMP METABOLIC PANEL   Result Value Ref Range    Sodium 141 135 - 145 mmol/L    Potassium 3.8 3.6 - 5.5 mmol/L    Chloride 109 96 - 112 mmol/L    Co2 12 (L) 20 - 33 mmol/L    Anion  Gap 20.0 (H) 7.0 - 16.0    Glucose 174 (H) 65 - 99 mg/dL    Bun 8 8 - 22 mg/dL    Creatinine 0.85 0.50 - 1.40 mg/dL    Calcium 9.8 8.5 - 10.5 mg/dL    Correct Calcium 8.9 8.5 - 10.5 mg/dL    AST(SGOT) 21 12 - 45 U/L    ALT(SGPT) 24 2 - 50 U/L    Alkaline Phosphatase 76 30 - 99 U/L    Total Bilirubin 0.6 0.1 - 1.5 mg/dL    Albumin 5.1 (H) 3.2 - 4.9 g/dL    Total Protein 7.5 6.0 - 8.2 g/dL    Globulin 2.4 1.9 - 3.5 g/dL    A-G Ratio 2.1 g/dL   LIPASE   Result Value Ref Range    Lipase 22 11 - 82 U/L   HCG QUAL SERUM   Result Value Ref Range    Beta-Hcg Qualitative Serum Negative Negative   ESTIMATED GFR   Result Value Ref Range    GFR (CKD-EPI) 92 >60 mL/min/1.73 m 2     *Note: Due to a large number of results and/or encounters for the requested time period, some results have not been displayed. A complete set of results can be found in Results Review.       I have independently interpreted this EKG    RADIOLOGY/PROCEDURES   The attending emergency physician has independently interpreted the diagnostic imaging associated with this visit and am waiting the final reading from the radiologist.   My preliminary interpretation is a follows:     Radiologist interpretation:  CT-ABDOMEN-PELVIS WITH   Final Result         1. Hepatosplenomegaly.   2. No evidence for obstructive uropathy.   3. Postoperative changes as detailed above.   4. No evidence for bowel obstruction, diverticulitis, or appendicitis.          COURSE & MEDICAL DECISION MAKING     ASSESSMENT, COURSE AND PLAN  Care Narrative:     6:43 PM - Patient seen and examined at bedside for diarrhea. Discussed plan of care, including labs, medication, and imaging. Patient agrees to the plan of care. The patient will be medicated with Morphine 8 mg injection 4mg/4mL, Zofran 4 mg injection, and LR bolus. Ordered for CT-Abdomen-Pelvis With, CBC w/ Diff, CMP, Lipase, HCG Qual Serum, and UA to evaluate her symptoms.     Blood work with acidosis similar to prior visit to the  emergency department  No significant anemia present  No hematemesis present  Patient given morphine for pain control  Patient prescribed Imodium and agrees to return if vomiting develops  Patient agrees to follow-up with GI regarding this  CT obtained to ensure no diverticulitis or perforation and neither are present today  Incidental hepatosplenomegaly noted    DISPOSITION AND DISCUSSIONS      Escalation of care considered, and ultimately not performed: acute inpatient care management, however at this time, the patient is most appropriate for outpatient management.    FINAL DIANGOSIS  1. Diarrhea, unspecified type    2. Acute abdominal pain    3. Hepatosplenomegaly        The note accurately reflects work and decisions made by me.  Jimmy Abbott M.D.  4/15/2024  8:39 PM

## 2024-04-16 NOTE — ED NOTES
Assumed care of pt, received bedside report from SONYA Gamble    Pt is up to the bathroom, gait steady. call light within reach.

## 2024-04-16 NOTE — ED NOTES
Pt discharged home, discharge and follow up care instructions given, prescriptions sent to the pharmacy of choice, pt verbalized understanding. IV removed, pt escorted out of ED via WC with ED tech to meet Grandmother for ride home.

## 2024-04-16 NOTE — ED NOTES
Bedside report to SONYA Ugarte.  Pt just returned from CT.  Pt ambulatory to bathroom with personal cane and steady gait.

## 2024-04-24 ENCOUNTER — OFFICE VISIT (OUTPATIENT)
Dept: URGENT CARE | Facility: CLINIC | Age: 34
End: 2024-04-24
Payer: MEDICARE

## 2024-04-24 VITALS
OXYGEN SATURATION: 97 % | SYSTOLIC BLOOD PRESSURE: 134 MMHG | DIASTOLIC BLOOD PRESSURE: 78 MMHG | RESPIRATION RATE: 12 BRPM | TEMPERATURE: 97.7 F | BODY MASS INDEX: 44.15 KG/M2 | WEIGHT: 258.6 LBS | HEART RATE: 73 BPM | HEIGHT: 64 IN

## 2024-04-24 DIAGNOSIS — L68.0 HIRSUTISM: ICD-10-CM

## 2024-04-24 DIAGNOSIS — L73.9 FOLLICULITIS: Primary | ICD-10-CM

## 2024-04-24 PROCEDURE — 3075F SYST BP GE 130 - 139MM HG: CPT

## 2024-04-24 PROCEDURE — 99213 OFFICE O/P EST LOW 20 MIN: CPT

## 2024-04-24 PROCEDURE — 3078F DIAST BP <80 MM HG: CPT

## 2024-04-24 ASSESSMENT — ENCOUNTER SYMPTOMS
DOUBLE VISION: 0
SENSORY CHANGE: 0
HEADACHES: 0
COUGH: 0
STRIDOR: 0
DEPRESSION: 0
SPEECH CHANGE: 0
DIARRHEA: 0
ABDOMINAL PAIN: 0
SPUTUM PRODUCTION: 0
BRUISES/BLEEDS EASILY: 0
EYE REDNESS: 0
SORE THROAT: 0
WEIGHT LOSS: 0
SHORTNESS OF BREATH: 0
WHEEZING: 0
MYALGIAS: 0
FEVER: 0
EYE PAIN: 0
BLURRED VISION: 0
DIZZINESS: 0
NERVOUS/ANXIOUS: 0
CHILLS: 0
PALPITATIONS: 0
EYE DISCHARGE: 0
SINUS PAIN: 0
HEARTBURN: 0
NAUSEA: 0
WEAKNESS: 0
VOMITING: 0

## 2024-04-24 ASSESSMENT — FIBROSIS 4 INDEX: FIB4 SCORE: 0.77

## 2024-04-24 NOTE — PROGRESS NOTES
Subjective:   Kristin Balderrama is a 33 y.o. female who presents for Dizziness (X3 days Red spots on body, autoimmune skin condition and spreading.)          I introduced myself to the patient and informed them that I am a family nurse practitioner.    HPI:Kristin is a 33-year-old female with a hx of PCOS and hirsutism who comes in today c/o rash to her neck and chin area. Onset was approximately 3 days ago, following shaving skin.  Patient describes symptoms as constant. They describe the pain as irritation. Aggravating factors include none. Relieving factors include none. Treatments tried at home include none. They describe their symptoms as mild to moderate.  She denies any fever, chills, nausea or vomiting      Review of Systems   Constitutional:  Negative for chills, fever, malaise/fatigue and weight loss.   HENT:  Negative for congestion, ear pain, sinus pain and sore throat.    Eyes:  Negative for blurred vision, double vision, pain, discharge and redness.   Respiratory:  Negative for cough, sputum production, shortness of breath, wheezing and stridor.    Cardiovascular:  Negative for chest pain, palpitations and leg swelling.   Gastrointestinal:  Negative for abdominal pain, diarrhea, heartburn, nausea and vomiting.   Genitourinary:  Negative for dysuria, frequency and urgency.   Musculoskeletal:  Negative for joint pain and myalgias.   Skin:  Positive for rash.   Neurological:  Negative for dizziness, sensory change, speech change, weakness and headaches.   Endo/Heme/Allergies:  Does not bruise/bleed easily.   Psychiatric/Behavioral:  Negative for depression. The patient is not nervous/anxious.        Medications: acetaminophen Tabs  adalimumab  amLODIPine Tabs  BORIC ACID VAGINAL VA  Corlanor Tabs  diphenhydrAMINE Tabs  docusate sodium Caps  Emgality Soaj  etonogestrel Impl  gabapentin Caps  lamoTRIgine Tabs  loperamide Caps  Melatonin Tabs  methylPREDNISolone Tbpk  metoprolol SR Tb24  Nurtec  Tbdp  omeprazole  ondansetron Tbdp  sodium chloride Tabs  tizanidine Tabs  topiramate  ziprasidone Caps     Allergies: Cefdinir, Depakote [divalproex sodium], Depakote [divalproex sodium], Doxycycline, Montelukast, Montelukast [singulair], Vancomycin, Wound dressing adhesive, Amitriptyline, Amoxicillin, Aripiprazole, Aripiprazole [abilify], Clindamycin, Erythromycin, Flomax [tamsulosin hydrochloride], Hydromorphone, Levaquin, Metformin, Sulfa drugs, Tamsulosin, Tape, Ampicillin, Ciprofloxacin, Keflex, Levofloxacin, Metronidazole, Penicillins, and Valproic acid    Problem List: does not have any pertinent problems on file.    Surgical History:  Past Surgical History:   Procedure Laterality Date    MI CYSTOSCOPY,INSERT URETERAL STENT Right 2/12/2024    Procedure: CYSTOSCOPY, WITH RIGHT URETEROSCOPY, WITH LITHOTRIPSY, WITH INSERTION OF RIGHT URETERAL STENT;  Surgeon: Josafat Roberson M.D.;  Location: SURGERY [facility];  Service: Urology    MI CYSTO/URETERO/PYELOSCOPY, DX Right 2/12/2024    Procedure: URETEROSCOPY;  Surgeon: Josafat Roberson M.D.;  Location: SURGERY [facility];  Service: Urology    LASERTRIPSY Right 2/12/2024    Procedure: LITHOTRIPSY, USING LASER;  Surgeon: Josafat Roberson M.D.;  Location: [facility];  Service: Urology    INGUINAL HERNIA LAPAROSCOPIC Right 07/21/2023    Procedure: LAPAROSCOPIC RIGHT INGUINAL HERNIA REPAIR WITH MESH;  Surgeon: Joe Noyola M.D.;  Location: SURGERY [facility];  Service: General    HERNIA REPAIR Right 07/21/2023    PB PERCUT FIX PBOX/NECK FEMUR FX Left 01/28/2023    Procedure: FIXATION, HIP, USING CANNULATED SCREW;  Surgeon: Noman Abdul M.D.;  Location: SURGERY [facility];  Service: Orthopedics    MI LAP,DIAGNOSTIC ABDOMEN  02/14/2022    Procedure: LAPAROSCOPY;  Surgeon: Seamus Pisano M.D.;  Location: SURGERY SAME DAY [facility];  Service: Gynecology    OVARIAN CYSTECTOMY Right 02/14/2022    Procedure: EXCISION, CYST, OVARY;  Surgeon: Seamus LIN  RUFUS Pisano;  Location: SURGERY SAME DAY [facility];  Service: Gynecology    BOWEL STIMULATOR INSERTION  03/10/2021    Procedure: INSERTION, ELECTRODE LEADS AND PULSE GENERATOR, NEUROSTIMULATOR, SACRAL - REMOVAL OF INTERSTIM WITH REPLACEMENT OF SACRAL NEUROMODULATION DEVICE;  Surgeon: Joe Noyola M.D.;  Location: SURGERY [facility];  Service: General    MUSCLE BIOPSY Right 01/26/2017    Procedure: MUSCLE BIOPSY - THIGH;  Surgeon: Isidro Vigil M.D.;  Location: SURGERY [facility];  Service:     GASTROSCOPY WITH BALLOON DILATATION N/A 01/04/2017    Procedure: GASTROSCOPY WITH DILATATION;  Surgeon: Torres Vargas M.D.;  Location: SURGERY [facility];  Service:     BOWEL STIMULATOR INSERTION  07/13/2016    Procedure: BOWEL STIMULATOR INSERTION FOR PERMANENT INTERSTIM SACRAL IMPLANT;  Surgeon: Joe Noyola M.D.;  Location: SURGERY [facility];  Service:     RECOVERY  01/27/2016    Procedure: CATH LAB EP STUDY WITH SINUS NODE MODIFICATION LA;  Surgeon: [facility];  Location: SURGERY PRE-POST PROC UNIT [facility];  Service:     OTHER CARDIAC SURGERY  01/2016    cardiac ablation    NEURO DEST FACET L/S W/IG SNGL  04/21/2015    Performed by Reza Tabor at SURGERY [facility]    LUMBAR FUSION ANTERIOR  08/21/2012    Performed by SUSIE SAWANT at SURGERY [facility]S    ALYSSA BY LAPAROSCOPY  08/29/2010    Performed by SHAYY JOHNS at SURGERY [facility] ORS    LAMINOTOMY      OTHER ABDOMINAL SURGERY      TONSILLECTOMY      tonsillectomy       Past Social Hx:   reports that she has never smoked. She has never used smokeless tobacco. She reports that she does not drink alcohol and does not use drugs.     Past Family Hx:   family history includes Genitourinary () Problems in her sister; Heart Disease in her maternal grandmother and mother; Hypertension in her maternal grandmother, maternal uncle, and mother; No Known Problems in her sister; Other in her mother and sister; Sleep Apnea in  "her mother.     Problem list, medications, and allergies reviewed by myself today in Epic.   I have documented what I find to be significant in regards to past medical, social, family and surgical history  in my HPI or under PMH/PSH/FH review section, otherwise it is noncontributory     Objective:     /78   Pulse 73   Temp 36.5 °C (97.7 °F) (Temporal)   Resp 12   Ht 1.626 m (5' 4\")   Wt 117 kg (258 lb 9.6 oz)   SpO2 97%   BMI 44.39 kg/m²     During this visit, appropriate PPE was worn, and hand hygiene was performed.    Physical Exam  Vitals reviewed.   Constitutional:       General: She is not in acute distress.     Appearance: Normal appearance. She is normal weight. She is not ill-appearing or toxic-appearing.   HENT:      Head: Normocephalic and atraumatic.      Right Ear: External ear normal.      Left Ear: External ear normal.      Nose: No congestion or rhinorrhea.   Eyes:      General: No scleral icterus.        Right eye: No discharge.         Left eye: No discharge.      Extraocular Movements: Extraocular movements intact.      Conjunctiva/sclera: Conjunctivae normal.   Cardiovascular:      Rate and Rhythm: Normal rate.   Pulmonary:      Effort: Pulmonary effort is normal.   Abdominal:      General: There is no distension.   Genitourinary:     Comments: Deferred  Musculoskeletal:         General: No swelling or tenderness. Normal range of motion.      Right lower leg: No edema.      Left lower leg: No edema.   Skin:     General: Skin is warm and dry.      Findings: Rash present.      Comments: There is a papular mildly erythematous rash present on her neck and chin area with a few scattered papules consistent with folliculitis.  There are no associated signs of cellulitis   Neurological:      General: No focal deficit present.      Mental Status: She is alert and oriented to person, place, and time. Mental status is at baseline.   Psychiatric:         Mood and Affect: Mood normal.         " Behavior: Behavior normal.         Assessment/Plan:     Diagnosis and associated orders:     1. Folliculitis  Benzoyl Peroxide 2.5 % Cream      2. Hirsutism           Comments/MDM:     1. Folliculitis  Discussed with patient Dx likely folliculitis barbae from shaving her chin and neck with a razor due to hirsutism caused by the PCOS.  Discussed possible DDx, management options (risks,benefits, and alternatives to planned treatment), natural progression.   Supportive care measures were discussed.   Questions were encouraged and answered.  Written information was provided and I did go over this with the patient in clinic today.  Instructed patient regarding red flags and to return to urgent care prn if new or worsening sx or if there is no improvement in condition prn.    Advised the patient to follow-up with the primary care physician for recheck, reevaluation, and consideration of further management.  Discussed with patient that the folliculitis is mild, and due to her multiple allergies including multiple antibiotics, we will treat this conservatively at this point with some benzyl peroxide cream.  I did instruct patient regarding medications prescribed, purpose, side effects, precautions.  Instructed patient to get a pharmacy consult when picking up any prescribed medications.  Strict ER precautions discussed for any increased redness, swelling, warmth to touch, pain, in the setting of fever, chills, nausea or vomiting, lethargy   Patient states they have good understanding and they are agreeable with the plan of care.     - Benzoyl Peroxide 2.5 % Cream; Apply 1 Application topically 2 times a day for 14 days.  Dispense: 21 g; Refill: 1    2. Hirsutism  Instructed patient to avoid shaving due to the risk of folliculitis/cellulitis, instructed her there are medication such as spironolactone that can be used to combat hirsutism, encouraged her to make a follow-up appointment with her PCP to discuss this.  She states  she has good understanding and will do so.         Pt is clinically stable at today's acute urgent care visit. Vital signs are normal and reassuring.  No acute distress noted. Appropriate for outpatient management at this time.        I personally reviewed prior external notes and test results pertinent to today's visit.  I have independently reviewed and interpreted all diagnostics ordered during this urgent care acute visit.        Please note that this dictation was created using voice recognition software. I have made a reasonable attempt to correct obvious errors, but I expect that there are errors of grammar and possibly content that I did not discover before finalizing the note.    This note was electronically signed by Jose PUGA, LILLY, REAL, RICHY

## 2024-05-09 ENCOUNTER — OFFICE VISIT (OUTPATIENT)
Dept: URGENT CARE | Facility: CLINIC | Age: 34
End: 2024-05-09
Payer: MEDICARE

## 2024-05-09 ENCOUNTER — HOSPITAL ENCOUNTER (OUTPATIENT)
Dept: RADIOLOGY | Facility: MEDICAL CENTER | Age: 34
End: 2024-05-09
Attending: FAMILY MEDICINE
Payer: MEDICARE

## 2024-05-09 ENCOUNTER — HOSPITAL ENCOUNTER (OUTPATIENT)
Facility: MEDICAL CENTER | Age: 34
End: 2024-05-09
Attending: FAMILY MEDICINE
Payer: MEDICARE

## 2024-05-09 VITALS
TEMPERATURE: 97 F | SYSTOLIC BLOOD PRESSURE: 124 MMHG | DIASTOLIC BLOOD PRESSURE: 76 MMHG | HEART RATE: 72 BPM | HEIGHT: 64 IN | WEIGHT: 258 LBS | RESPIRATION RATE: 16 BRPM | BODY MASS INDEX: 44.05 KG/M2 | OXYGEN SATURATION: 98 %

## 2024-05-09 DIAGNOSIS — R10.84 GENERALIZED ABDOMINAL PAIN: ICD-10-CM

## 2024-05-09 LAB
APPEARANCE UR: CLEAR
BILIRUB UR STRIP-MCNC: NEGATIVE MG/DL
COLOR UR AUTO: YELLOW
GLUCOSE UR STRIP.AUTO-MCNC: NEGATIVE MG/DL
KETONES UR STRIP.AUTO-MCNC: NEGATIVE MG/DL
LEUKOCYTE ESTERASE UR QL STRIP.AUTO: NEGATIVE
NITRITE UR QL STRIP.AUTO: NEGATIVE
PH UR STRIP.AUTO: 5.5 [PH] (ref 5–8)
POCT INT CON NEG: NEGATIVE
POCT INT CON POS: POSITIVE
POCT URINE PREGNANCY TEST: NEGATIVE
PROT UR QL STRIP: NEGATIVE MG/DL
RBC UR QL AUTO: NEGATIVE
SP GR UR STRIP.AUTO: >=1.03
UROBILINOGEN UR STRIP-MCNC: 0.2 MG/DL

## 2024-05-09 PROCEDURE — 81002 URINALYSIS NONAUTO W/O SCOPE: CPT | Performed by: FAMILY MEDICINE

## 2024-05-09 PROCEDURE — 3074F SYST BP LT 130 MM HG: CPT | Performed by: FAMILY MEDICINE

## 2024-05-09 PROCEDURE — 99213 OFFICE O/P EST LOW 20 MIN: CPT | Performed by: FAMILY MEDICINE

## 2024-05-09 PROCEDURE — 3078F DIAST BP <80 MM HG: CPT | Performed by: FAMILY MEDICINE

## 2024-05-09 PROCEDURE — 81025 URINE PREGNANCY TEST: CPT | Performed by: FAMILY MEDICINE

## 2024-05-09 RX ORDER — METOCLOPRAMIDE 5 MG/1
5-10 TABLET ORAL EVERY 6 HOURS PRN
Qty: 30 TABLET | Refills: 0 | Status: CANCELLED | OUTPATIENT
Start: 2024-05-09

## 2024-05-09 RX ADMIN — IOHEXOL 100 ML: 350 INJECTION, SOLUTION INTRAVENOUS at 18:09

## 2024-05-09 RX ADMIN — IOHEXOL 25 ML: 240 INJECTION, SOLUTION INTRATHECAL; INTRAVASCULAR; INTRAVENOUS; ORAL at 18:09

## 2024-05-09 ASSESSMENT — FIBROSIS 4 INDEX: FIB4 SCORE: 0.77

## 2024-05-10 DIAGNOSIS — N20.0 KIDNEY STONE: ICD-10-CM

## 2024-05-11 LAB
BACTERIA UR CULT: NORMAL
SIGNIFICANT IND 70042: NORMAL
SITE SITE: NORMAL
SOURCE SOURCE: NORMAL

## 2024-05-27 ENCOUNTER — APPOINTMENT (OUTPATIENT)
Dept: RADIOLOGY | Facility: IMAGING CENTER | Age: 34
End: 2024-05-27
Payer: MEDICARE

## 2024-05-27 ENCOUNTER — OFFICE VISIT (OUTPATIENT)
Dept: URGENT CARE | Facility: CLINIC | Age: 34
End: 2024-05-27
Payer: MEDICARE

## 2024-05-27 VITALS
BODY MASS INDEX: 44.05 KG/M2 | WEIGHT: 258 LBS | SYSTOLIC BLOOD PRESSURE: 124 MMHG | HEART RATE: 73 BPM | RESPIRATION RATE: 25 BRPM | OXYGEN SATURATION: 97 % | DIASTOLIC BLOOD PRESSURE: 70 MMHG | TEMPERATURE: 97.8 F | HEIGHT: 64 IN

## 2024-05-27 DIAGNOSIS — S76.211A INGUINAL STRAIN, RIGHT, INITIAL ENCOUNTER: ICD-10-CM

## 2024-05-27 DIAGNOSIS — R05.2 SUBACUTE COUGH: ICD-10-CM

## 2024-05-27 DIAGNOSIS — R20.9 SENSATION OF COLD IN LOWER EXTREMITY: ICD-10-CM

## 2024-05-27 PROCEDURE — 3074F SYST BP LT 130 MM HG: CPT

## 2024-05-27 PROCEDURE — 3078F DIAST BP <80 MM HG: CPT

## 2024-05-27 PROCEDURE — 71046 X-RAY EXAM CHEST 2 VIEWS: CPT | Mod: TC | Performed by: PHYSICIAN ASSISTANT

## 2024-05-27 PROCEDURE — 99214 OFFICE O/P EST MOD 30 MIN: CPT

## 2024-05-27 ASSESSMENT — ENCOUNTER SYMPTOMS
TINGLING: 0
COUGH: 1
SHORTNESS OF BREATH: 1
FEVER: 0
SENSORY CHANGE: 1

## 2024-05-27 ASSESSMENT — FIBROSIS 4 INDEX: FIB4 SCORE: 0.77

## 2024-05-27 NOTE — PROGRESS NOTES
Subjective:     CHIEF COMPLAINT  Chief Complaint   Patient presents with    Other     Pt states leg goes cold, temperature difference, walking pneumonia, high temp still         HPI  Kristin Balderrama is a very pleasant 33 y.o. female who presents accompanied by her family member with intermittent temperature differences between her right and left leg for the past 3 days.  She reports that she was seen at [facility] on Saturday after straining her right groin.  She had an x-ray performed showing no evidence of a hip fracture or dislocation.  She has an MRI ordered for further investigation of groin injury.  She has been experiencing intermittent coolness in her right leg that seems to come and go.  She denies changes in sensation/numbness.     Additionally, she has been taking doxycycline for the past 10 days for suspected atypical pneumonia.  She had a chest x-ray performed at [facility] with normal findings.  She reports she has been using her inhalers and her nebulizer at home.  She has been having temperatures up to 99.8 at home.  She is concerned because she feels that her symptoms are not improving.    REVIEW OF SYSTEMS  Review of Systems   Constitutional:  Positive for malaise/fatigue. Negative for fever.   Respiratory:  Positive for cough and shortness of breath.    Cardiovascular:  Negative for leg swelling.   Neurological:  Positive for sensory change (Cool sensation in R lower extremity, intermittent). Negative for tingling.       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    Diabetes ([facility]) 11/14/2023    Inguinal hernia 11/14/2023    Myopia of both eyes 08/29/2023    Cervical radiculopathy  08/17/2023    S/p left hip fracture 01/31/2023    Postural orthostatic tachycardia syndrome 01/28/2023    Hip fracture, left, closed, initial encounter ([facility]) 01/27/2023    Moderate persistent asthma without complication 01/27/2023    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    Hypermobility syndrome 11/10/2022    Kidney stone 10/31/2022    Insomnia 10/31/2022    Migraine 10/10/2022    Heart murmur 10/10/2022    Right inguinal hernia 10/09/2022    Migraine, hemiplegic 07/27/2022    Irritable bowel syndrome 07/27/2022    Hemiplegic 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, not intractable 07/27/2022    Hidradenitis axillaris 07/10/2022    Vision changes 06/28/2022    Palpitations 05/13/2022    Inappropriate sinus tachycardia ([facility]) 09/24/2021    Psychogenic disorder 06/24/2021    Diplopia 06/23/2021    Failure to thrive in adult 06/21/2021    Acute bilateral low back pain with bilateral sciatica 06/16/2021    Normal pressure hydrocephalus (HCC) 06/04/2021    GIB (gastrointestinal bleeding) 05/24/2021    Uncomplicated asthma 04/15/2021    Blood per rectum 04/15/2021    Bilateral hand pain 04/05/2021    Suprapubic catheter dysfunction ([facility]) 02/05/2021    UTI (urinary tract infection) 10/11/2020    Prolonged Q-T interval on ECG 08/27/2020    Dry eyes 08/18/2020    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[facility]) 08/15/2020    Chronic obstructive lung disease ([facility]) 04/20/2020    Schizoaffective disorder, depressive type ([facility]) 03/02/2020    PTSD (post-traumatic stress disorder) 03/02/2020    IIH (idiopathic intracranial hypertension) 02/27/2020    Mixed stress and urge urinary incontinence 12/27/2019    Mild intermittent asthma without complication 12/16/2019    Immunocompromised ([facility]) 11/06/2019    Moderate persistent asthma with acute exacerbation 08/14/2019    Optic neuritis 05/27/2019    Supraventricular tachycardia (HCC) 04/10/2019    Muscular deconditioning 07/14/2018    TONYA (obstructive sleep apnea) 01/09/2018    Functional diarrhea 01/05/2018    Mood  disorder (HCC) 10/28/2016    Schizophrenia (HCC) 10/27/2016    Full incontinence of feces 07/13/2016    Polypharmacy 06/27/2016    Vitamin D deficiency 05/21/2016    Lactic acidosis 04/19/2016    Severe obesity (BMI >= 40) (HCC) 03/07/2016    Scoliosis 03/07/2016    GERD (gastroesophageal reflux disease) 03/07/2016    Peripheral neuropathy and chronic pain syndrome (CMS-HCC) 03/06/2016    Atypical chest pain 08/06/2015    Fatty liver disease, nonalcoholic 01/19/2015    Anxiety 12/16/2014    Hyperglycemia 09/05/2014    Urinary retention 04/02/2011    Dissociative identity disorder (HCC) 04/02/2011    Borderline personality disorder (HCC) 09/18/2010    AP (abdominal pain) 09/18/2010       SURGICAL HISTORY   has a past surgical history that includes neuro dest facet l/s w/ig sngl (04/21/2015); recovery (01/27/2016); [place] by laparoscopy (08/29/2010); lumbar fusion anterior (08/21/2012); other cardiac surgery (01/2016); tonsillectomy; bowel stimulator insertion (07/13/2016); gastroscopy with balloon dilatation (N/A, 01/04/2017); muscle biopsy (Right, 01/26/2017); other abdominal surgery; laminotomy; bowel stimulator insertion (03/10/2021); lap,diagnostic abdomen (02/14/2022); ovarian cystectomy (Right, 02/14/2022); percut fix prox/neck femur fx (Left, 01/28/2023); inguinal hernia laparoscopic (Right, 07/21/2023); hernia repair (Right, 07/21/2023); cystoscopy,insert ureteral stent (Right, 2/12/2024); cysto/uretero/pyeloscopy, dx (Right, 2/12/2024); and lasertripsy (Right, 2/12/2024).    ALLERGIES  Allergies   Allergen Reactions    Cefdinir Shortness of Breath and Itching     Tolerated 1/18/17  Tolerates ceftriaxone  Tolerated augmentin 8/2019     Depakote [Divalproex Sodium] Unspecified     Muscle spasms/muscle pain and weakness      Depakote [Divalproex Sodium]      Muscle spasms/muscle pain and weakness    Doxycycline Anaphylaxis and Vomiting     Other reaction(s): pustules/blisters  Other reaction(s): stomach pain     "Montelukast      Cardiac effusion    Montelukast [Singulair] Unspecified     Cardiac effusion    Vancomycin Itching     Pt becomes flushed in face and chest.   RXN=7/10/16    Wound Dressing Adhesive Rash     By pt report-\"removes skin totally off\"    Amitriptyline Unspecified     Headaches      Amoxicillin Rash          Aripiprazole Unspecified     Headaches/muscle twitching      Aripiprazole [Abilify] Unspecified     Headaches/muscle twitching      Clindamycin Nausea         Other reaction(s): nausea stomach pain    Erythromycin Rash     .  Other reaction(s): nausea stomach pain    Flomax [Tamsulosin Hydrochloride] Swelling    Hydromorphone      Other reaction(s): vomiting    Levaquin Unspecified     Severe muscle cramps in legs  RXN=unknown  Other reaction(s): leg muscle cramps    Metformin Unspecified     Increased lactic acid     Other reaction(s): itching and rash/nausea vomiting    Sulfa Drugs Hives, Itching, Myalgia and Unspecified     Muscle pain and weakness    Other reaction(s): unknown    Tamsulosin Swelling     Swelling of legs    Tape Rash     Tears skin off  coban with Tegaderm tape ok intermittently  RXN=ongoing    Ampicillin Rash     Pt reports that she received a rash     Ciprofloxacin Rash          Keflex Rash     Pt states she gets a rash with this medication  Tolerates ceftriaxone  Can take with Benadryl    Levofloxacin Unspecified     Leg muscle cramps    Metronidazole Rash     \"Vision problems\"  Other reaction(s): vision problems    Penicillins Hives     Can take with Benadryl    Valproic Acid Rash     .       CURRENT MEDICATIONS  Home Medications       Reviewed by Lili Rivera P.A.-C. (Physician Assistant) on 05/27/24 at 1036  Med List Status: <None>     Medication Last Dose Status   acetaminophen (TYLENOL) 500 MG Tab Taking Active   adalimumab (HUMIRA) 80 MG/0.8ML injection Taking Active   amLODIPine (NORVASC) 5 MG Tab Taking Active   BORIC ACID VAGINAL VA Taking Active   BORIC ACID " VAGINAL VA Taking Active   diphenhydrAMINE (BENADRYL) 25 MG Tab Taking Active   docusate sodium 250 MG Cap Taking Active   etonogestrel (NEXPLANON) 68 MG Implant implant Taking Active   gabapentin (NEURONTIN) 400 MG Cap Taking Active   Galcanezumab-gnlm (EMGALITY) 120 MG/ML Solution Auto-injector Taking Active   ivabradine (CORLANOR) 7.5 MG Tab tablet Taking Active   lamoTRIgine (LAMICTAL) 25 MG Tab Taking Active   loperamide (IMODIUM) 2 MG Cap Taking Active   Melatonin 10 MG Tab Taking Active   methylPREDNISolone (MEDROL DOSEPAK) 4 MG Tablet Therapy Pack Not Taking Active   metoprolol SR (TOPROL XL) 25 MG TABLET SR 24 HR Taking Active   omeprazole (PRILOSEC) 20 MG delayed-release capsule Taking Active   ondansetron (ZOFRAN ODT) 4 MG TABLET DISPERSIBLE Taking Active   Rimegepant Sulfate (NURTEC) 75 MG TABLET DISPERSIBLE Taking Active   sodium chloride (SALT) 1 GM Tab Taking Active   tizanidine (ZANAFLEX) 4 MG Tab Taking Active   topiramate (TOPAMAX) 50 MG tablet Taking Active   ziprasidone (GEODON) 40 MG Cap Taking Active                    SOCIAL HISTORY  Social History     Tobacco Use    Smoking status: Never    Smokeless tobacco: Never   Vaping Use    Vaping status: Never Used   Substance and Sexual Activity    Alcohol use: No     Alcohol/week: 0.0 oz    Drug use: Never     Frequency: 7.0 times per week    Sexual activity: Not Currently     Birth control/protection: Implant       FAMILY HISTORY  Family History   Problem Relation Age of Onset    Hypertension Mother     Sleep Apnea Mother     Heart Disease Mother     Other Mother         hypothryod    Hypertension Maternal Uncle     Heart Disease Maternal Grandmother     Hypertension Maternal Grandmother     No Known Problems Sister     Other Sister         Narcolepsy;fibromyalsia;bone;nerve    Genitourinary () Problems Sister         endometriosis          Objective:     VITAL SIGNS: /70   Pulse 73   Temp 36.6 °C (97.8 °F) (Temporal)   Resp (!) 25   Ht  "1.626 m (5' 4\")   Wt 117 kg (258 lb)   SpO2 97%   BMI 44.29 kg/m²     PHYSICAL EXAM  Physical Exam  Vitals reviewed. Exam conducted with a chaperone present.   Constitutional:       General: She is not in acute distress.     Appearance: Normal appearance. She is not ill-appearing or toxic-appearing.   HENT:      Head: Normocephalic and atraumatic.      Mouth/Throat:      Mouth: Mucous membranes are moist.   Eyes:      Conjunctiva/sclera: Conjunctivae normal.      Pupils: Pupils are equal, round, and reactive to light.   Cardiovascular:      Rate and Rhythm: Normal rate.   Pulmonary:      Effort: Pulmonary effort is normal. No respiratory distress.      Breath sounds: Normal breath sounds. Decreased air movement (Short inhalation/exhalation) present. No stridor. No wheezing, rhonchi or rales.   Musculoskeletal:      Right lower leg: No swelling. No edema.      Left lower leg: No swelling. No edema.      Right foot: Normal capillary refill. Normal pulse.      Left foot: Normal capillary refill. Normal pulse.      Comments: Pedal pulse and posterior tibial pulse 2+ present bilaterally.  No changes to skin coloration.  No swelling of right lower extremity.   Skin:     General: Skin is warm and dry.      Capillary Refill: Capillary refill takes less than 2 seconds.      Coloration: Skin is not pale.   Neurological:      General: No focal deficit present.      Mental Status: She is alert and oriented to person, place, and time.   Psychiatric:         Mood and Affect: Mood normal.         Assessment/Plan:     1. Subacute cough  - DX-CHEST-2 VIEWS; Future    2. Inguinal strain, right, initial encounter    3. Sensation of cold in lower extremity  -Follow-up with [facility] regarding groin injury   -If symptoms acutely worsen, be seen in emergency department  -Follow-up with PCP  -Tylenol over-the-counter as needed  -Continue use of albuterol inhaler    Results:  DX-CHEST-2 VIEWS  Narrative: 5/27/2024 10:48 AM    HISTORY/REASON " FOR EXAM:  Cough; OVer 2 weeks of cough and wheezing.    TECHNIQUE/EXAM DESCRIPTION AND NUMBER OF VIEWS:  Two views of the chest.    COMPARISON:  4/1/2024    FINDINGS:  Cardiac silhouette is normal in size.  No airspace infiltrate, edema, or pleural effusion.  Impression: No acute process.      MDM/Comments:  I have prepared for this visit by personally reviewing the patient's prevous medical records, vitals, and labs including: most recent GFR and CMP.  Most recent hip and lumbar spine x-ray imaging reviewed.  Patient has a history of asthma and chronic obstructive lung disease. Patient has stable vital signs and is non-toxic appearing.  Patient reports intermittent coolness of the right lower extremity.  Patient is completely asymptomatic on physical exam.  Pulses are equal bilaterally.  Discussed following up with the [facility]/being seen in the emergency department if symptoms acutely worsen.  Patient is neurovascularly intact at this time.  Repeat chest x-ray performed given history of atypical pneumonia without improvement in symptoms.  Chest x-ray showing no evidence of pneumonia.  I personally reviewed x-ray images and agree with radiology's interpretation.  Discussed supportive care with hydration, rest, Tylenol as needed. Patient demonstrated understanding of treatment plan at this time and will RTC/ER if symptoms worsen or fail to resolve.       Differential diagnosis, natural history, supportive care, and indications for immediate follow-up discussed. All questions answered. Patient agrees with the plan of care.    Follow-up as needed if symptoms worsen or fail to improve to PCP, Urgent care or Emergency Room.    I have personally reviewed prior external notes and test results pertinent to today's visit.  I have independently reviewed and interpreted all diagnostics ordered during this urgent care acute visit.   Discussed management options (risks,benefits, and alternatives to treatment). Pt expresses  understanding and the treatment plan was agreed upon. Questions were encouraged and answered to pt's satisfaction.    Please note that this dictation was created using voice recognition software. I have made a reasonable attempt to correct obvious errors, but I expect that there are errors of grammar and possibly content that I did not discover before finalizing the note.

## 2024-05-28 ENCOUNTER — HOSPITAL ENCOUNTER (EMERGENCY)
Facility: MEDICAL CENTER | Age: 34
End: 2024-05-28
Attending: EMERGENCY MEDICINE
Payer: MEDICARE

## 2024-05-28 ENCOUNTER — APPOINTMENT (OUTPATIENT)
Dept: RADIOLOGY | Facility: MEDICAL CENTER | Age: 34
End: 2024-05-28
Attending: EMERGENCY MEDICINE
Payer: MEDICARE

## 2024-05-28 VITALS
HEART RATE: 77 BPM | WEIGHT: 258 LBS | BODY MASS INDEX: 44.29 KG/M2 | SYSTOLIC BLOOD PRESSURE: 133 MMHG | TEMPERATURE: 98.4 F | DIASTOLIC BLOOD PRESSURE: 89 MMHG | RESPIRATION RATE: 18 BRPM | OXYGEN SATURATION: 93 %

## 2024-05-28 DIAGNOSIS — S76.011D STRAIN OF RIGHT HIP, SUBSEQUENT ENCOUNTER: ICD-10-CM

## 2024-05-28 DIAGNOSIS — M79.604 RIGHT LEG PAIN: ICD-10-CM

## 2024-05-28 PROCEDURE — A9270 NON-COVERED ITEM OR SERVICE: HCPCS | Mod: UD | Performed by: EMERGENCY MEDICINE

## 2024-05-28 PROCEDURE — 93971 EXTREMITY STUDY: CPT | Mod: 26,RT | Performed by: INTERNAL MEDICINE

## 2024-05-28 PROCEDURE — 99284 EMERGENCY DEPT VISIT MOD MDM: CPT

## 2024-05-28 PROCEDURE — 700102 HCHG RX REV CODE 250 W/ 637 OVERRIDE(OP): Mod: UD | Performed by: EMERGENCY MEDICINE

## 2024-05-28 PROCEDURE — 93971 EXTREMITY STUDY: CPT | Mod: RT

## 2024-05-28 RX ORDER — OXYCODONE HYDROCHLORIDE 5 MG/1
10 TABLET ORAL 2 TIMES DAILY
Qty: 16 TABLET | Refills: 0 | Status: SHIPPED | OUTPATIENT
Start: 2024-05-28 | End: 2024-06-01

## 2024-05-28 RX ORDER — OXYCODONE HYDROCHLORIDE 5 MG/1
10 TABLET ORAL ONCE
Status: COMPLETED | OUTPATIENT
Start: 2024-05-28 | End: 2024-05-28

## 2024-05-28 RX ADMIN — OXYCODONE 10 MG: 5 TABLET ORAL at 09:49

## 2024-05-28 ASSESSMENT — PAIN DESCRIPTION - PAIN TYPE: TYPE: ACUTE PAIN

## 2024-05-28 ASSESSMENT — FIBROSIS 4 INDEX: FIB4 SCORE: 0.77

## 2024-05-28 NOTE — ED PROVIDER NOTES
ED Provider Note    CHIEF COMPLAINT  Chief Complaint   Patient presents with    Hip Pain       EXTERNAL RECORDS REVIEWED  Patient's last ED encounter was April 15 of this year patient was seen in the emergency department with diarrhea.      Additionally, I have reviewed the patient's recent urgent care visit from a few days ago.  Outpatient encounter May 3 of this year for left ankle pain.  She was referred to physical therapy.  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    HPI/ROS  LIMITATION TO HISTORY   None  OUTSIDE HISTORIAN(S):  None    Kristin Balderrama is a 33 y.o. female who presents to the emergency department with a chief complaint of pain in her right leg.  Symptoms started on Friday, they worsened the following day.  She does not recollect any traumatic events.  No history of recent falls.  She was seen in the [facility] on Saturday had a negative x-ray and they recommended MRI which is in the process of being scheduled.  She subsequently followed up in the urgent care and was instructed to come to the emergency department if symptoms were worsening.  She describes alternating change in temperature.  She does states that in the urgent care she was told that her pulses were normal and there was no asymmetry but there was concern for possible vascular injury so if symptoms persisted or worsened, again to come to the emergency department for evaluation.  Patient denies any chest pain or shortness of breath.  She reports that she is getting over a course of pneumonia and does seem to be gradually getting better.  She reports a remote history of vascular injury going back to 2016.  She has a history of joint laxity and Ehrlos Danlos syndrome    PAST MEDICAL HISTORY   has a past medical history of Abdominal pain, Anginal syndrome, Apnea, sleep, Arrhythmia, Arthritis,  ASTHMA, Back pain, Borderline personality disorder (HCC), Breath shortness, Bronchitis (02/08/2022), Cardiac arrhythmia, Chickenpox, Chronic UTI (09/18/2010), Cough, Daytime sleepiness, Dental disorder (03/08/2021), Depression, Diabetes (HCC), Diarrhea, Disorder of thyroid, Fall, Fatigue, Frequent headaches, Gasping for breath, Gynecological disorder, Headache(784.0), Heart burn, Heart murmur, History of falling, Indigestion, Migraine, Mitochondrial disease (HCC), Multiple personality disorder (HCC), Nausea, Obesity, Other fatigue (06/29/2020), Pain (08/15/2012), Painful joint, PCOS (polycystic ovarian syndrome), Pneumonia (2012 12/2020), POTS (postural orthostatic tachycardia syndrome), Psychosis ([facility]), Ringing in ears, Scoliosis, Shortness of breath, Sinus tachycardia (10/31/2013), Sleep apnea, Snoring, Supraventricular tachycardia (HCC) (4/10/2019), Tonsillitis, Transverse myelitis ([facility]), Tuberculosis, Urinary bladder disorder, Urinary incontinence, Weakness, and Wears glasses.    SURGICAL HISTORY   has a past surgical history that includes neuro dest facet l/s w/ig sngl (04/21/2015); recovery (01/27/2016); [place] by laparoscopy (08/29/2010); lumbar fusion anterior (08/21/2012); other cardiac surgery (01/2016); tonsillectomy; bowel stimulator insertion (07/13/2016); gastroscopy with balloon dilatation (N/A, 01/04/2017); muscle biopsy (Right, 01/26/2017); other abdominal surgery; laminotomy; bowel stimulator insertion (03/10/2021); lap,diagnostic abdomen (02/14/2022); ovarian cystectomy (Right, 02/14/2022); percut fix prox/neck femur fx (Left, 01/28/2023); inguinal hernia laparoscopic (Right, 07/21/2023); hernia repair (Right, 07/21/2023); cystoscopy,insert ureteral stent (Right, 2/12/2024); cysto/uretero/pyeloscopy, dx (Right, 2/12/2024); and lasertripsy (Right, 2/12/2024).    FAMILY HISTORY  Family History   Problem Relation Age of Onset    Hypertension Mother     Sleep Apnea Mother     Heart Disease Mother      Other Mother         hypothryod    Hypertension Maternal Uncle     Heart Disease Maternal Grandmother     Hypertension Maternal Grandmother     No Known Problems Sister     Other Sister         Narcolepsy;fibromyalsia;bone;nerve    Genitourinary () Problems Sister         endometriosis       SOCIAL HISTORY  Social History     Tobacco Use    Smoking status: Never    Smokeless tobacco: Never   Vaping Use    Vaping status: Never Used   Substance and Sexual Activity    Alcohol use: No     Alcohol/week: 0.0 oz    Drug use: Never     Frequency: 7.0 times per week    Sexual activity: Not Currently     Birth control/protection: Implant       CURRENT MEDICATIONS  Home Medications       Reviewed by Shanel Shin R.N. (Registered Nurse) on 05/28/24 at 0831  Med List Status: Partial     Medication Last Dose Status   acetaminophen (TYLENOL) 500 MG Tab  Active   adalimumab (HUMIRA) 80 MG/0.8ML injection  Active   amLODIPine (NORVASC) 5 MG Tab  Active   BORIC ACID VAGINAL VA  Active   BORIC ACID VAGINAL VA  Active   diphenhydrAMINE (BENADRYL) 25 MG Tab  Active   docusate sodium 250 MG Cap  Active   etonogestrel (NEXPLANON) 68 MG Implant implant  Active   gabapentin (NEURONTIN) 400 MG Cap  Active   Galcanezumab-gnlm (EMGALITY) 120 MG/ML Solution Auto-injector  Active   ivabradine (CORLANOR) 7.5 MG Tab tablet  Active   lamoTRIgine (LAMICTAL) 25 MG Tab  Active   loperamide (IMODIUM) 2 MG Cap  Active   Melatonin 10 MG Tab  Active   methylPREDNISolone (MEDROL DOSEPAK) 4 MG Tablet Therapy Pack  Active   metoprolol SR (TOPROL XL) 25 MG TABLET SR 24 HR  Active   omeprazole (PRILOSEC) 20 MG delayed-release capsule  Active   ondansetron (ZOFRAN ODT) 4 MG TABLET DISPERSIBLE  Active   Rimegepant Sulfate (NURTEC) 75 MG TABLET DISPERSIBLE  Active   sodium chloride (SALT) 1 GM Tab  Active   tizanidine (ZANAFLEX) 4 MG Tab  Active   topiramate (TOPAMAX) 50 MG tablet  Active   ziprasidone (GEODON) 40 MG Cap  Active               "      ALLERGIES  Allergies   Allergen Reactions    Cefdinir Shortness of Breath and Itching     Tolerated 1/18/17  Tolerates ceftriaxone  Tolerated augmentin 8/2019     Depakote [Divalproex Sodium] Unspecified     Muscle spasms/muscle pain and weakness      Depakote [Divalproex Sodium]      Muscle spasms/muscle pain and weakness    Doxycycline Anaphylaxis and Vomiting     Other reaction(s): pustules/blisters  Other reaction(s): stomach pain    Montelukast      Cardiac effusion    Montelukast [Singulair] Unspecified     Cardiac effusion    Vancomycin Itching     Pt becomes flushed in face and chest.   RXN=7/10/16    Wound Dressing Adhesive Rash     By pt report-\"removes skin totally off\"    Amitriptyline Unspecified     Headaches      Amoxicillin Rash          Aripiprazole Unspecified     Headaches/muscle twitching      Aripiprazole [Abilify] Unspecified     Headaches/muscle twitching      Clindamycin Nausea         Other reaction(s): nausea stomach pain    Erythromycin Rash     .  Other reaction(s): nausea stomach pain    Flomax [Tamsulosin Hydrochloride] Swelling    Hydromorphone      Other reaction(s): vomiting    Levaquin Unspecified     Severe muscle cramps in legs  RXN=unknown  Other reaction(s): leg muscle cramps    Metformin Unspecified     Increased lactic acid     Other reaction(s): itching and rash/nausea vomiting    Sulfa Drugs Hives, Itching, Myalgia and Unspecified     Muscle pain and weakness    Other reaction(s): unknown    Tamsulosin Swelling     Swelling of legs    Tape Rash     Tears skin off  coban with Tegaderm tape ok intermittently  RXN=ongoing    Ampicillin Rash     Pt reports that she received a rash     Ciprofloxacin Rash          Keflex Rash     Pt states she gets a rash with this medication  Tolerates ceftriaxone  Can take with Benadryl    Levofloxacin Unspecified     Leg muscle cramps    Metronidazole Rash     \"Vision problems\"  Other reaction(s): vision problems    Penicillins Hives     " Can take with Benadryl    Valproic Acid Rash     .       PHYSICAL EXAM  VITAL SIGNS: /89   Pulse 77   Temp 36.9 °C (98.4 °F) (Temporal)   Resp 18   Wt 117 kg (258 lb)   SpO2 93%   BMI 44.29 kg/m²    Vitals reviewed.   Constitutional: Patient is oriented to person, place, and time. Appears well-developed and well-nourished. Mild distress.    Head: Normocephalic and atraumatic.   Mouth/Throat: Oropharynx is clear  Eyes: Conjunctivae are normal.   Neck: Normal range of motion. Neck supple.   Cardiovascular: Normal rate, regular rhythm and normal heart sounds. Normal peripheral pulses, bilateral LE.  Pulmonary/Chest: Effort normal and breath sounds normal. No respiratory distress, no wheezes, rhonchi, or rales. No chest wall tenderness.  Abdominal: Soft. Bowel sounds are normal. There is no tenderness  Musculoskeletal: No edema. Diffuse tenderness to RLE, extending from the ankle to the groin.  There are no overlying skin changes to this area.  No evidence of cellulitis.   Neurological: No cranial nerve deficits. Normal motor and sensory exam. No focal deficits.   Skin: Skin is warm and dry. No erythema. No pallor.   Psychiatric: Patient has a normal mood and affect.     EKG/LABS    RADIOLOGY/PROCEDURES     Radiologist interpretation:  US-EXTREMITY VENOUS LOWER UNILAT RIGHT   Final Result      No DVT    COURSE & MEDICAL DECISION MAKING    ASSESSMENT, COURSE AND PLAN  Care Narrative:     This is a pleasant 33-year-old female.  She has been having right leg pain since Friday.  She has been seen in the [facility] had an x-ray which was unrevealing and she is in the process of getting set up for an outpatient MRI as she was told that she may have a labral tear.  She subsequently was seen in the urgent care was told that she did not at the time, have evidence of vascular disease but was instructed to go to the emergency department if she had worsening symptoms.  On my exam, patient's lower extremities  are symmetric in appearance, they are both warm to touch.  No ischemic changes.  No cellulitic changes.  No swelling or increased warmth to the involved leg.  She has a remote, about 8 and half years ago, history of vascular injury that occurred during the procedure.  Currently, I do not have suspicion for arterial occlusion or clot.  Given the pain, pressure to the leg, there is higher concern for venous problem and will obtain an ultrasound.    1110AM patient is reevaluated at the bedside.  She reports improvement after administration of oral pain medications.  We discussed ultrasound findings.  No DVT.  On reevaluation, again her lower extremities are not ischemic.  They are warm to touch.  There is no cellulitic changes.  She is in the process of scheduling the MRI which she is encouraged to pursue.  At this point, I see no indication for further emergent evaluation.  Doubt infection.  She has reassuring vital signs.  She will be prescribed a short course of oxycodone which she is tolerated well in the past.   was checked.  There is no concerning prescription filling pattern.  She is encouraged to follow-up with her PCP for ongoing management.  She is discharged home in stable condition.    ADDITIONAL PROBLEMS MANAGED    DISPOSITION AND DISCUSSIONS  I have discussed management of the patient with the following physicians and ARIES's:  None    Discussion of management with other QHP or appropriate source(s): None     Escalation of care considered, and ultimately not performed: imaging    Barriers to care at this time, including but not limited to: None.     Decision tools and prescription drugs considered including, but not limited to: None.    FINAL DIAGNOSIS  1. Right leg pain    2. Strain of right hip, subsequent encounter           Electronically signed by: Ruth Ann Thomas D.O., 5/28/2024 9:13 AM

## 2024-05-28 NOTE — ED TRIAGE NOTES
"Pt ambulatory to triage c/o right sided hip pain x 1 week and \"asymptomatic temperature canges\" Pt states leg also feels tingling. nad  "

## 2024-05-28 NOTE — ED NOTES
Patient reports recently being seen at Urgent Care. Patient reports increasing pian, loss of strength and a swollen lump around right groin.   
Patient taken to bathroom via wheelchair. Patient transferred without issue and returned safely to room.   
Pt discharged, all appropriate hospital equipment removed (IV, monitor, pulse ox, etc.). Pt left unit via wheelchair with RN assistance to ED lobby for transport home with family. Personal belongings with pt when leaving unit. Pt given discharge instructions prior to leaving unit including where to  prescriptions and when to follow-up; verbalizes understanding. Pt informed to return to ED if symptoms worsen/return or altered status develop. Copy of discharge instructions signed and turned into DC basket and copy sent with pt. '  
US at bedside  
Yes

## 2024-06-03 ENCOUNTER — OFFICE VISIT (OUTPATIENT)
Dept: URGENT CARE | Facility: CLINIC | Age: 34
End: 2024-06-03
Payer: MEDICARE

## 2024-06-03 VITALS
HEART RATE: 89 BPM | DIASTOLIC BLOOD PRESSURE: 84 MMHG | HEIGHT: 64 IN | WEIGHT: 242 LBS | SYSTOLIC BLOOD PRESSURE: 116 MMHG | RESPIRATION RATE: 24 BRPM | BODY MASS INDEX: 41.32 KG/M2 | TEMPERATURE: 98.3 F | OXYGEN SATURATION: 98 %

## 2024-06-03 DIAGNOSIS — G62.89 OTHER POLYNEUROPATHY: Chronic | ICD-10-CM

## 2024-06-03 DIAGNOSIS — H57.11 PAIN OF RIGHT EYE: ICD-10-CM

## 2024-06-03 PROCEDURE — 99214 OFFICE O/P EST MOD 30 MIN: CPT

## 2024-06-03 PROCEDURE — 3079F DIAST BP 80-89 MM HG: CPT

## 2024-06-03 PROCEDURE — 3074F SYST BP LT 130 MM HG: CPT

## 2024-06-03 RX ORDER — KETOROLAC TROMETHAMINE 30 MG/ML
15 INJECTION, SOLUTION INTRAMUSCULAR; INTRAVENOUS ONCE
Status: COMPLETED | OUTPATIENT
Start: 2024-06-03 | End: 2024-06-03

## 2024-06-03 RX ADMIN — KETOROLAC TROMETHAMINE 15 MG: 30 INJECTION, SOLUTION INTRAMUSCULAR; INTRAVENOUS at 17:27

## 2024-06-03 ASSESSMENT — ENCOUNTER SYMPTOMS
TINGLING: 1
EYE PAIN: 1
FEVER: 0
CHILLS: 0
FALLS: 1
WEAKNESS: 1

## 2024-06-03 ASSESSMENT — VISUAL ACUITY: OU: 1

## 2024-06-03 ASSESSMENT — FIBROSIS 4 INDEX: FIB4 SCORE: 0.77

## 2024-06-04 ENCOUNTER — HOSPITAL ENCOUNTER (EMERGENCY)
Facility: MEDICAL CENTER | Age: 34
End: 2024-06-05
Attending: STUDENT IN AN ORGANIZED HEALTH CARE EDUCATION/TRAINING PROGRAM
Payer: MEDICARE

## 2024-06-04 ENCOUNTER — APPOINTMENT (OUTPATIENT)
Dept: RADIOLOGY | Facility: MEDICAL CENTER | Age: 34
End: 2024-06-04
Attending: STUDENT IN AN ORGANIZED HEALTH CARE EDUCATION/TRAINING PROGRAM
Payer: MEDICARE

## 2024-06-04 DIAGNOSIS — H53.8 BLURRED VISION: ICD-10-CM

## 2024-06-04 DIAGNOSIS — R51.9 NONINTRACTABLE HEADACHE, UNSPECIFIED CHRONICITY PATTERN, UNSPECIFIED HEADACHE TYPE: ICD-10-CM

## 2024-06-04 PROCEDURE — 99284 EMERGENCY DEPT VISIT MOD MDM: CPT

## 2024-06-04 PROCEDURE — 700102 HCHG RX REV CODE 250 W/ 637 OVERRIDE(OP): Mod: UD | Performed by: STUDENT IN AN ORGANIZED HEALTH CARE EDUCATION/TRAINING PROGRAM

## 2024-06-04 PROCEDURE — A9270 NON-COVERED ITEM OR SERVICE: HCPCS | Mod: UD | Performed by: STUDENT IN AN ORGANIZED HEALTH CARE EDUCATION/TRAINING PROGRAM

## 2024-06-04 RX ORDER — HYDROCODONE BITARTRATE AND ACETAMINOPHEN 5; 325 MG/1; MG/1
1 TABLET ORAL EVERY 6 HOURS PRN
Status: DISCONTINUED | OUTPATIENT
Start: 2024-06-04 | End: 2024-06-05 | Stop reason: HOSPADM

## 2024-06-04 RX ORDER — PROMETHAZINE HYDROCHLORIDE 25 MG/1
25 TABLET ORAL ONCE
Status: COMPLETED | OUTPATIENT
Start: 2024-06-05 | End: 2024-06-04

## 2024-06-04 RX ADMIN — PROMETHAZINE HYDROCHLORIDE 25 MG: 25 TABLET ORAL at 23:47

## 2024-06-04 RX ADMIN — HYDROCODONE BITARTRATE AND ACETAMINOPHEN 1 TABLET: 5; 325 TABLET ORAL at 23:46

## 2024-06-04 ASSESSMENT — FIBROSIS 4 INDEX: FIB4 SCORE: 0.77

## 2024-06-04 NOTE — PROGRESS NOTES
Chief Complaint   Patient presents with    Leg Pain     X1 week. Checked for DVT in RT leg and found multiple compressed veins. Has been having sharp shooting pain in legs b/l.     Fall     Fall DOI 5/29/23. Pt hit her head and had a concussion. C/o worsening headache, head pressure, when she tilts her head she has increased pressure in her RT eye. Had a hip injury that led to her fall. Currently hypersensitive to sounds and lights.        HISTORY OF PRESENT ILLNESS: Patient is a pleasant 33 y.o. female who presents to urgent care today patient has an extremely complex history with multiple allergies.  She comes in today with ongoing complaints of her peripheral neuropathy with no relief from her gabapentin, she states she has been seen in the emergency room several times for this and has had no major relief.  She is being followed by chronic pain management.    Patient notes that her right eye has a feeling of pressure behind it, she does have an appointment with her eye doctor tomorrow to evaluate the pressures in her eye.  She denies any changes to her vision.    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    Diabetes ([facility]) 11/14/2023    Inguinal hernia 11/14/2023    Myopia of both eyes 08/29/2023    Cervical radiculopathy 08/17/2023    S/p left hip fracture 01/31/2023    Postural orthostatic tachycardia syndrome 01/28/2023    Hip fracture, left, closed, initial encounter ([facility]) 01/27/2023    Moderate persistent asthma without complication 01/27/2023    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    Hypermobility syndrome 11/10/2022    Kidney stone 10/31/2022    Insomnia 10/31/2022    Migraine 10/10/2022    Heart murmur 10/10/2022    Right  inguinal hernia 10/09/2022    Migraine, hemiplegic 07/27/2022    Irritable bowel syndrome 07/27/2022    Hemiplegic 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, not intractable 07/27/2022    Hidradenitis axillaris 07/10/2022    Vision changes 06/28/2022    Palpitations 05/13/2022    Inappropriate sinus tachycardia (HCC) 09/24/2021    Psychogenic disorder 06/24/2021    Diplopia 06/23/2021    Failure to thrive in adult 06/21/2021    Acute bilateral low back pain with bilateral sciatica 06/16/2021    Normal pressure hydrocephalus (HCC) 06/04/2021    GIB (gastrointestinal bleeding) 05/24/2021    Uncomplicated asthma 04/15/2021    Blood per rectum 04/15/2021    Bilateral hand pain 04/05/2021    Suprapubic catheter dysfunction ([facility]) 02/05/2021    UTI (urinary tract infection) 10/11/2020    Prolonged Q-T interval on ECG 08/27/2020    Dry eyes 08/18/2020    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    Chronic obstructive lung disease ([facility]) 04/20/2020    Schizoaffective disorder, depressive type ([facility]) 03/02/2020    PTSD (post-traumatic stress disorder) 03/02/2020    IIH (idiopathic intracranial hypertension) 02/27/2020    Mixed stress and urge urinary incontinence 12/27/2019    Mild intermittent asthma without complication 12/16/2019    Immunocompromised ([facility]) 11/06/2019    Moderate persistent asthma with acute exacerbation 08/14/2019    Optic neuritis 05/27/2019    Supraventricular tachycardia ([facility]) 04/10/2019    Muscular deconditioning 07/14/2018    TONYA (obstructive sleep apnea) 01/09/2018    Functional diarrhea 01/05/2018    Mood disorder ([facility]) 10/28/2016    Schizophrenia ([facility]) 10/27/2016    Full incontinence of feces 07/13/2016    Polypharmacy 06/27/2016    Vitamin D deficiency 05/21/2016    Lactic acidosis 04/19/2016    Severe obesity (BMI >= 40) ([facility]) 03/07/2016    Scoliosis 03/07/2016    GERD (gastroesophageal reflux disease) 03/07/2016    Peripheral neuropathy and chronic pain syndrome ([facility]) 03/06/2016    Atypical chest pain  08/06/2015    Fatty liver disease, nonalcoholic 01/19/2015    Anxiety 12/16/2014    Hyperglycemia 09/05/2014    Urinary retention 04/02/2011    Dissociative identity disorder (HCC) 04/02/2011    Borderline personality disorder (HCC) 09/18/2010    AP (abdominal pain) 09/18/2010       Allergies:Cefdinir, Depakote [divalproex sodium], Depakote [divalproex sodium], Doxycycline, Montelukast, Montelukast [singulair], Vancomycin, Wound dressing adhesive, Amitriptyline, Amoxicillin, Aripiprazole, Aripiprazole [abilify], Clindamycin, Erythromycin, Flomax [tamsulosin hydrochloride], Hydromorphone, Levaquin, Metformin, Sulfa drugs, Tamsulosin, Tape, Sulfamethoxazole w-trimethoprim, Ampicillin, Ciprofloxacin, Keflex, Levofloxacin, Metronidazole, Penicillins, and Valproic acid    Current Outpatient Medications Ordered in Epic   Medication Sig Dispense Refill    loperamide (IMODIUM) 2 MG Cap Take 1 Capsule by mouth 4 times a day as needed for Diarrhea. 30 Capsule 0    methylPREDNISolone (MEDROL DOSEPAK) 4 MG Tablet Therapy Pack Follow schedule on package instructions. 21 Tablet 0    BORIC ACID VAGINAL VA Insert 1 Suppository into the vagina as needed (For yeast). (OTC)      ondansetron (ZOFRAN ODT) 4 MG TABLET DISPERSIBLE Take 1 Tablet by mouth every 6 hours as needed for Nausea/Vomiting. 10 Tablet 0    adalimumab (HUMIRA) 80 MG/0.8ML injection Inject 80 mg under the skin every 14 days. Last injection was on 3/14/2024      ivabradine (CORLANOR) 7.5 MG Tab tablet Take 1 Tablet by mouth 2 times a day with meals. 60 Tablet 11    amLODIPine (NORVASC) 5 MG Tab Take 5 mg by mouth every day.      Rimegepant Sulfate (NURTEC) 75 MG TABLET DISPERSIBLE Take 75 mg by mouth 1 time a day as needed (migraine).      gabapentin (NEURONTIN) 400 MG Cap Take 1,200 mg by mouth 3 times a day. 3 capsules=1200mg      acetaminophen (TYLENOL) 500 MG Tab Take 1,000 mg by mouth 1 time a day as needed for Mild Pain. Indications: Pain      BORIC ACID VAGINAL  "VA Insert 1 Suppository into the vagina 1 time a day as needed (yeast infection).      docusate sodium 250 MG Cap Take 250 mg by mouth 2 times a day.      sodium chloride (SALT) 1 GM Tab TAKE 1 TABLET BY MOUTH THREE TIMES A DAY WITH MEALS ( NOT COVERED/INS ) (Patient taking differently: Take 1 g by mouth 3 times a day.) 90 Tablet 2    omeprazole (PRILOSEC) 20 MG delayed-release capsule Take 20 mg by mouth 2 times a day.      lamoTRIgine (LAMICTAL) 25 MG Tab Take 50 mg by mouth 2 times a day. 50 mg = 2 tablets      Galcanezumab-gnlm (EMGALITY) 120 MG/ML Solution Auto-injector Inject 120 mg under the skin every 30 (thirty) days. On the 27th of every month, last dose was taken on 2/27/2024      topiramate (TOPAMAX) 50 MG tablet Take 50 mg by mouth 2 times a day.      metoprolol SR (TOPROL XL) 25 MG TABLET SR 24 HR Take 1 Tablet by mouth every day. 90 Tablet 3    tizanidine (ZANAFLEX) 4 MG Tab Take 4 mg by mouth 2 times a day.      ziprasidone (GEODON) 40 MG Cap Take 1 capsule by mouth at bedtime. 30 Capsule 2    diphenhydrAMINE (BENADRYL) 25 MG Tab Take 25-50 mg by mouth 2 times a day. 25 mg in the morning, 50 mg in the evening      Melatonin 10 MG Tab Take 10 mg by mouth at bedtime.      etonogestrel (NEXPLANON) 68 MG Implant implant Inject 68 mg under the skin see administration instructions. Pt reports she is not sure when she had this medications injected last, pt reports she still has it in her arm  Every 3 years to change, thinks it was placed 2021       Current Facility-Administered Medications Ordered in Epic   Medication Dose Route Frequency Provider Last Rate Last Admin    ketorolac (Toradol) injection 15 mg  15 mg Intramuscular Once            Past Medical History:   Diagnosis Date    Abdominal pain     Anginal syndrome     random chest pain especially with tachycardia    Apnea, sleep     Arrhythmia     \"sinus tachycardia\", cariologist, Dr. Kumar; ablation 2/2016    Arthritis     osteo    ASTHMA     when " "around smoke    Back pain     Borderline personality disorder (HCC)     Breath shortness     with tachycardia    Bronchitis 02/08/2022    Last time was 12/21    Cardiac arrhythmia     Chickenpox     Chronic UTI 09/18/2010    Cough     Daytime sleepiness     Dental disorder 03/08/2021    infected tooth    Depression     Diabetes (HCC)     Diarrhea     incontinece    Disorder of thyroid     Hashimoto's    Fall     Fatigue     Frequent headaches     Gasping for breath     Gynecological disorder     PCOS    Headache(784.0)     Heart burn     Heart murmur     History of falling     Indigestion     Migraine     Mitochondrial disease (HCC)     Multiple personality disorder (HCC)     Nausea     Obesity     Other fatigue 06/29/2020    Pain 08/15/2012    back, 7/10    Painful joint     PCOS (polycystic ovarian syndrome)     Pneumonia 2012 12/2020    POTS (postural orthostatic tachycardia syndrome)     Psychosis (HCC)     Ringing in ears     Scoliosis     Shortness of breath     O2 as needed    Sinus tachycardia 10/31/2013    Sleep apnea     CPAP \"pulmonary doctor took me off mid year 2016\"    Snoring     Supraventricular tachycardia (HCC) 4/10/2019    Tonsillitis     Transverse myelitis (HCC)     2/8/22: Per pt: not anymore    Tuberculosis     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    Urinary bladder disorder     Suprapubic cath. 2/8/22: Not anymore.     Urinary incontinence     Weakness     Wears glasses        Social History     Tobacco Use    Smoking status: Never    Smokeless tobacco: Never   Vaping Use    Vaping status: Never Used   Substance Use Topics    Alcohol use: No     Alcohol/week: 0.0 oz    Drug use: Never     Frequency: 7.0 times per week       Family Status   Relation Name Status    Mo thyroid disease,IBS Alive        44 2016    MUnc  Alive    MGMo  Alive    Fa  Alive        bio father hx unknown    Sis  Alive    Sis  Alive     Family History   Problem Relation Age of Onset    Hypertension Mother     " "Sleep Apnea Mother     Heart Disease Mother     Other Mother         hypothryod    Hypertension Maternal Uncle     Heart Disease Maternal Grandmother     Hypertension Maternal Grandmother     No Known Problems Sister     Other Sister         Narcolepsy;fibromyalsia;bone;nerve    Genitourinary () Problems Sister         endometriosis       Review of Systems   Constitutional:  Negative for chills, fever and malaise/fatigue.   Eyes:  Positive for pain.        Right I   Musculoskeletal:  Positive for falls.        Bilateral lower extremities   Neurological:  Positive for tingling and weakness.       Exam:  /84 (BP Location: Right arm, Patient Position: Sitting, BP Cuff Size: Large adult)   Pulse 89   Temp 36.8 °C (98.3 °F) (Temporal)   Resp (!) 24   Ht 1.626 m (5' 4\")   Wt 110 kg (242 lb)   SpO2 98%   Physical Exam  Vitals reviewed.   Constitutional:       General: She is not in acute distress.     Appearance: Normal appearance. She is normal weight. She is not toxic-appearing.   HENT:      Head: Normocephalic.      Right Ear: Tympanic membrane, ear canal and external ear normal. There is no impacted cerumen.      Left Ear: Tympanic membrane, ear canal and external ear normal. There is no impacted cerumen.      Nose: No nasal tenderness or mucosal edema.      Mouth/Throat:      Mouth: Mucous membranes are moist.      Tonsils: No tonsillar exudate. 1+ on the right. 1+ on the left.   Eyes:      General: Lids are normal. Vision grossly intact.         Right eye: No discharge.         Left eye: No discharge.      Extraocular Movements: Extraocular movements intact.      Right eye: Normal extraocular motion.      Left eye: Normal extraocular motion.      Conjunctiva/sclera: Conjunctivae normal.      Pupils: Pupils are equal, round, and reactive to light.   Cardiovascular:      Rate and Rhythm: Normal rate and regular rhythm.      Pulses: Normal pulses.      Heart sounds: Normal heart sounds. No murmur " heard.  Pulmonary:      Effort: Pulmonary effort is normal. No respiratory distress.      Breath sounds: Normal breath sounds.   Musculoskeletal:         General: Tenderness present. No swelling, deformity or signs of injury. Normal range of motion.      Cervical back: Normal range of motion and neck supple. No tenderness.      Right lower leg: No edema.      Left lower leg: No edema.      Comments: Right lower extremity   Skin:     General: Skin is warm and dry.      Capillary Refill: Capillary refill takes less than 2 seconds.      Findings: No bruising, erythema, lesion or rash.   Neurological:      General: No focal deficit present.      Mental Status: She is alert.      Sensory: No sensory deficit.      Motor: No weakness.      Coordination: Coordination normal.      Gait: Gait normal.   Psychiatric:         Mood and Affect: Mood normal.         Behavior: Behavior normal.         Thought Content: Thought content normal.         Judgment: Judgment normal.           Assessment/Plan:  1. Other polyneuropathy  - Referral to Neurology  - Referral to Vascular Medicine  - ketorolac (Toradol) injection 15 mg    2. Pain of right eye  - ketorolac (Toradol) injection 15 mg    Based on physical exam along with review of systems I do not feel comfortable providing further narcotics or further gabapentin for patient's ongoing chronic polyneuropathy.  Patient is on high-dose gabapentin at this time along with several other medications.  Encouraged continued use, referral placed for neurology and vascular if she continues to mention that she may have some vascular disease.  Patient does have hair to her legs, no major discoloration, full mobility is noted although her right leg does appear slightly weaker than the left.  I do not think imaging is required given the nature of her complex history.    Patient states she has ongoing pain to her right eye, vision appears intact today, extraocular eye movements noted.  Pupils are  round equal and reactive to light.  She has no redness, no drainage from her eye.  At this time I am unable to check pressures here in this office as this is not an urgent care scope of practice.  Encouraged to follow-up with the emergency room for pressure check or perhaps her eye physician tomorrow as she is scheduled.  Patient given Toradol for pain measures.  Should her pain persist or if she continues to get worse, develops any nausea or vomiting, changes to her vision, increasing headache she was advised to seek further evaluation in the emergency room. Total time spent with the patient 35 minutes to include, review of chart, charting, assessment, procedure.    Supportive care, differential diagnoses, and indications for immediate follow-up discussed with patient.   Pathogenesis of diagnosis discussed including typical length and natural progression.   Instructed to return to clinic or nearest emergency department for any change in condition, further concerns, or worsening of symptoms.  Patient states understanding of the plan of care and discharge instructions.  Instructed to make an appointment, for follow up, with primary care provider.      Please note that this dictation was created using voice recognition software. I have made every reasonable attempt to correct obvious errors, but I expect that there are errors of grammar and possibly content that I did not discover before finalizing the note.      Trini PUGA

## 2024-06-05 ENCOUNTER — APPOINTMENT (OUTPATIENT)
Dept: RADIOLOGY | Facility: MEDICAL CENTER | Age: 34
End: 2024-06-05
Attending: STUDENT IN AN ORGANIZED HEALTH CARE EDUCATION/TRAINING PROGRAM
Payer: MEDICARE

## 2024-06-05 VITALS
OXYGEN SATURATION: 97 % | BODY MASS INDEX: 44.56 KG/M2 | HEART RATE: 91 BPM | WEIGHT: 261 LBS | HEIGHT: 64 IN | TEMPERATURE: 97.8 F | SYSTOLIC BLOOD PRESSURE: 151 MMHG | DIASTOLIC BLOOD PRESSURE: 95 MMHG | RESPIRATION RATE: 18 BRPM

## 2024-06-05 PROCEDURE — 70450 CT HEAD/BRAIN W/O DYE: CPT

## 2024-06-05 NOTE — DISCHARGE INSTRUCTIONS
Keep with your eye doctor regarding your blurred vision.  You may still be having concussion symptoms.

## 2024-06-05 NOTE — ED NOTES
[id number]. Apologized for wait times and thanked for patience. No new changes in condition at this time.

## 2024-06-05 NOTE — ED PROVIDER NOTES
"ED Provider Note    CHIEF COMPLAINT  Chief Complaint   Patient presents with    GLF     Happened last May 29th; was seen at [facility] and was d/c    Other      eye pressure and loss of vision on one eye, right eye. States her eye balls got swollen and went  to  yesterday and was  just given a shot for pain.       EXTERNAL RECORDS REVIEWED  External ED Note ED visit May 28 right hip pain    HPI/ROS  LIMITATION TO HISTORY   Select: : None  OUTSIDE HISTORIAN(S):  none    Kristin Balderrama is a 33 y.o. female who presents with complaint of head pain since a fall on May 29 as well as blurred vision in the right eye.  Patient notes she had a fall on May 29.  She had a CT scan at outside hospital with no bleeding.  She has since been having headaches, trouble focusing, right eye blurred vision.  She has had no vomiting.  She has had some mild lack of appetite and trouble with sleeping.  She feels that her headache symptoms are progressing and she is concerned she has new bleeding in her head.  She also reports she has blurred vision in her right eye.  She was seen by her optometrist today as she has a history of Erler's Danlos syndrome and gets regular eye examinations.  She reports her vision was \"worse today than before\".  She also notes mild elevation in her intraocular pressures per her eye doctor.  She was not started on any new medications and she was not referred to the ER from her eye doctor.    PAST MEDICAL HISTORY   has a past medical history of Abdominal pain, Anginal syndrome, Apnea, sleep, Arrhythmia, Arthritis, ASTHMA, Back pain, Borderline personality disorder (HCC), Breath shortness, Bronchitis (02/08/2022), Cardiac arrhythmia, Chickenpox, Chronic UTI (09/18/2010), Cough, Daytime sleepiness, Dental disorder (03/08/2021), Depression, Diabetes (HCC), Diarrhea, Disorder of thyroid, Fall, Fatigue, Frequent headaches, Gasping for breath, Gynecological disorder, Headache(784.0), Heart burn, Heart " murmur, History of falling, Indigestion, Migraine, Mitochondrial disease (HCC), Multiple personality disorder (HCC), Nausea, Obesity, Other fatigue (06/29/2020), Pain (08/15/2012), Painful joint, PCOS (polycystic ovarian syndrome), Pneumonia (2012 12/2020), POTS (postural orthostatic tachycardia syndrome), Psychosis (HCC), Ringing in ears, Scoliosis, Shortness of breath, Sinus tachycardia (10/31/2013), Sleep apnea, Snoring, Supraventricular tachycardia (HCC) (4/10/2019), Tonsillitis, Transverse myelitis (HCC), Tuberculosis, Urinary bladder disorder, Urinary incontinence, Weakness, and Wears glasses.    SURGICAL HISTORY   has a past surgical history that includes neuro dest facet l/s w/ig sngl (04/21/2015); recovery (01/27/2016); [place] by laparoscopy (08/29/2010); lumbar fusion anterior (08/21/2012); other cardiac surgery (01/2016); tonsillectomy; bowel stimulator insertion (07/13/2016); gastroscopy with balloon dilatation (N/A, 01/04/2017); muscle biopsy (Right, 01/26/2017); other abdominal surgery; laminotomy; bowel stimulator insertion (03/10/2021); lap,diagnostic abdomen (02/14/2022); ovarian cystectomy (Right, 02/14/2022); percut fix prox/neck femur fx (Left, 01/28/2023); inguinal hernia laparoscopic (Right, 07/21/2023); hernia repair (Right, 07/21/2023); cystoscopy,insert ureteral stent (Right, 2/12/2024); cysto/uretero/pyeloscopy, dx (Right, 2/12/2024); and lasertripsy (Right, 2/12/2024).    FAMILY HISTORY  Family History   Problem Relation Age of Onset    Hypertension Mother     Sleep Apnea Mother     Heart Disease Mother     Other Mother         hypothryod    Hypertension Maternal Uncle     Heart Disease Maternal Grandmother     Hypertension Maternal Grandmother     No Known Problems Sister     Other Sister         Narcolepsy;fibromyalsia;bone;nerve    Genitourinary () Problems Sister         endometriosis       SOCIAL HISTORY  Social History     Tobacco Use    Smoking status: Never    Smokeless tobacco:  Never   Vaping Use    Vaping status: Never Used   Substance and Sexual Activity    Alcohol use: No     Alcohol/week: 0.0 oz    Drug use: Never     Frequency: 7.0 times per week    Sexual activity: Not Currently     Birth control/protection: Implant       CURRENT MEDICATIONS  Home Medications       Reviewed by Yulia Dash R.N. (Registered Nurse) on 06/04/24 at 2033  Med List Status: Not Addressed     Medication Last Dose Status   acetaminophen (TYLENOL) 500 MG Tab  Active   adalimumab (HUMIRA) 80 MG/0.8ML injection  Active   amLODIPine (NORVASC) 5 MG Tab  Active   BORIC ACID VAGINAL VA  Active   BORIC ACID VAGINAL VA  Active   diphenhydrAMINE (BENADRYL) 25 MG Tab  Active   docusate sodium 250 MG Cap  Active   etonogestrel (NEXPLANON) 68 MG Implant implant  Active   gabapentin (NEURONTIN) 400 MG Cap  Active   Galcanezumab-gnlm (EMGALITY) 120 MG/ML Solution Auto-injector  Active   ivabradine (CORLANOR) 7.5 MG Tab tablet  Active   lamoTRIgine (LAMICTAL) 25 MG Tab  Active   loperamide (IMODIUM) 2 MG Cap  Active   Melatonin 10 MG Tab  Active   methylPREDNISolone (MEDROL DOSEPAK) 4 MG Tablet Therapy Pack  Active   metoprolol SR (TOPROL XL) 25 MG TABLET SR 24 HR  Active   omeprazole (PRILOSEC) 20 MG delayed-release capsule  Active   ondansetron (ZOFRAN ODT) 4 MG TABLET DISPERSIBLE  Active   Rimegepant Sulfate (NURTEC) 75 MG TABLET DISPERSIBLE  Active   sodium chloride (SALT) 1 GM Tab  Active   tizanidine (ZANAFLEX) 4 MG Tab  Active   topiramate (TOPAMAX) 50 MG tablet  Active   ziprasidone (GEODON) 40 MG Cap  Active                    ALLERGIES  Allergies   Allergen Reactions    Cefdinir Shortness of Breath and Itching     Tolerated 1/18/17  Tolerates ceftriaxone  Tolerated augmentin 8/2019     Depakote [Divalproex Sodium] Unspecified     Muscle spasms/muscle pain and weakness      Depakote [Divalproex Sodium]      Muscle spasms/muscle pain and weakness    Doxycycline Anaphylaxis and Vomiting     Other reaction(s):  "pustules/blisters  Other reaction(s): stomach pain    Montelukast      Cardiac effusion    Montelukast [Singulair] Unspecified     Cardiac effusion    Vancomycin Itching     Pt becomes flushed in face and chest.   RXN=7/10/16    Wound Dressing Adhesive Rash     By pt report-\"removes skin totally off\"    Amitriptyline Unspecified     Headaches      Amoxicillin Rash          Aripiprazole Unspecified     Headaches/muscle twitching      Aripiprazole [Abilify] Unspecified     Headaches/muscle twitching      Clindamycin Nausea         Other reaction(s): nausea stomach pain    Erythromycin Rash     .  Other reaction(s): nausea stomach pain    Flomax [Tamsulosin Hydrochloride] Swelling    Hydromorphone      Other reaction(s): vomiting    Levaquin Unspecified     Severe muscle cramps in legs  RXN=unknown  Other reaction(s): leg muscle cramps    Metformin Unspecified     Increased lactic acid     Other reaction(s): itching and rash/nausea vomiting    Sulfa Drugs Hives, Itching, Myalgia and Unspecified     Muscle pain and weakness    Other reaction(s): unknown    Tamsulosin Swelling     Swelling of legs    Tape Rash     Tears skin off  coban with Tegaderm tape ok intermittently  RXN=ongoing    Sulfamethoxazole W-Trimethoprim Rash    Ampicillin Rash     Pt reports that she received a rash     Ciprofloxacin Rash          Keflex Rash     Pt states she gets a rash with this medication  Tolerates ceftriaxone  Can take with Benadryl    Levofloxacin Unspecified     Leg muscle cramps    Metronidazole Rash     \"Vision problems\"  Other reaction(s): vision problems    Penicillins Hives     Can take with Benadryl    Valproic Acid Rash     .       PHYSICAL EXAM  VITAL SIGNS: BP (!) 151/95   Pulse 91   Temp 36.6 °C (97.8 °F) (Temporal)   Resp 18   Ht 1.626 m (5' 4\")   Wt 118 kg (261 lb)   SpO2 97%   BMI 44.80 kg/m²    Constitutional: Awake and alert. No acute distress.  Head: NCAT.  HEENT: Normal Conjunctiva. PERRLA.  Vision grossly " normal.  Extraocular muscles intact.  Intraocular pressures are 18-20 and right eye  Neck: Grossly normal range of motion. Airway midline.  Cardiovascular: Normal heart rate, Normal rhythm.  Thorax & Lungs: No respiratory distress. Clear to Auscultation bilaterally.  Abdomen: Normal inspection. Nontender. Nondistended  Skin: No obvious rash.  Back: No tenderness, No CVA tenderness.   Musculoskeletal: No obvious deformity. Moves all extremities Well.  Neurologic: A&Ox3.   Psychiatric: Mood and affect are appropriate for situation.    RADIOLOGY/PROCEDURES   I have independently interpreted the diagnostic imaging associated with this visit and am waiting the final reading from the radiologist.   My preliminary interpretation is as follows: no ich    Radiologist interpretation:  CT-HEAD W/O   Final Result         1.  No acute intracranial abnormality.             COURSE & MEDICAL DECISION MAKING    ASSESSMENT, COURSE AND PLAN  Care Narrative:   33-year-old female here with complaint of progressive and persistent headaches and right eye blurred vision reported to have started after a fall on May 29  Afebrile and hypertensive  On exam no focal neurologic deficits, patient is grossly normal, intraocular pressures are technically in normal range but higher level of normal.  Pupils are equal and reactive bilaterally.  Did try to explain to patient that this far from the fall and head injury it is unlikely she has any significant bleeding.  We did discuss that concussion symptoms can linger and fluctuate for many weeks.  She is treated with promethazine and Norco for her pain.  Patient is persistently concerned about intracranial bleeding and therefore CT is ordered.  CT returns with no intracranial bleeding.  Her symptoms are improved and she is discharged.    Continue with her optometrist/ophthalmologist    ADDITIONAL PROBLEMS MANAGED  none    DISPOSITION AND DISCUSSIONS  I have discussed management of the patient with the  following physicians and ARIES's:  none    Discussion of management with other QHP or appropriate source(s): None     Escalation of care considered, and ultimately not performed:diagnostic imaging and acute inpatient care management, however at this time, the patient is most appropriate for outpatient management    Barriers to care at this time, including but not limited to:  None .     Decision tools and prescription drugs considered including, but not limited to:  None .    FINAL DIAGNOSIS  1. Nonintractable headache, unspecified chronicity pattern, unspecified headache type    2. Blurred vision           Electronically signed by: Sebas Galicia D.O., 6/4/2024 11:40 PM

## 2024-06-05 NOTE — ED TRIAGE NOTES
Chief Complaint   Patient presents with    GLF     Happened last May 29th; was seen at [facility] and was d/c    Other      eye pressure and loss of vision on one eye, right eye. States her eye balls got swollen and went  to  yesterday and was  just given a shot for pain.       Pt to triage ambulatory with cane for above complaint. States she was seen by Neuro and  optha today; was given instructions and paper to read on; hence, presenting  herself to ED. Optha told her that eye pressure was 23 and 24 but was not given medications for it.     Pt back to cristhian, educated on triage process and encourage to alert staff of any changes.     Vitals:    06/04/24 2017   BP: (!) 150/103   Pulse: 92   Resp: 18   Temp: 36.8 °C (98.3 °F)   SpO2: 99%

## 2024-06-07 ENCOUNTER — TELEPHONE (OUTPATIENT)
Dept: HEALTH INFORMATION MANAGEMENT | Facility: OTHER | Age: 34
End: 2024-06-07
Payer: MEDICARE

## 2024-06-10 ENCOUNTER — TELEPHONE (OUTPATIENT)
Dept: HEALTH INFORMATION MANAGEMENT | Facility: OTHER | Age: 34
End: 2024-06-10
Payer: MEDICARE

## 2024-06-11 ENCOUNTER — OFFICE VISIT (OUTPATIENT)
Dept: VASCULAR LAB | Facility: MEDICAL CENTER | Age: 34
End: 2024-06-11
Attending: FAMILY MEDICINE
Payer: MEDICARE

## 2024-06-11 VITALS
SYSTOLIC BLOOD PRESSURE: 120 MMHG | HEART RATE: 80 BPM | BODY MASS INDEX: 44.73 KG/M2 | HEIGHT: 64 IN | WEIGHT: 262 LBS | DIASTOLIC BLOOD PRESSURE: 71 MMHG

## 2024-06-11 DIAGNOSIS — E66.01 CLASS 3 SEVERE OBESITY WITH SERIOUS COMORBIDITY AND BODY MASS INDEX (BMI) OF 40.0 TO 44.9 IN ADULT, UNSPECIFIED OBESITY TYPE (HCC): ICD-10-CM

## 2024-06-11 DIAGNOSIS — I87.2 CHRONIC VENOUS INSUFFICIENCY: ICD-10-CM

## 2024-06-11 DIAGNOSIS — G62.89 OTHER POLYNEUROPATHY: Chronic | ICD-10-CM

## 2024-06-11 DIAGNOSIS — G90.A POSTURAL ORTHOSTATIC TACHYCARDIA SYNDROME (POTS): ICD-10-CM

## 2024-06-11 DIAGNOSIS — M35.7 HYPERMOBILITY SYNDROME: ICD-10-CM

## 2024-06-11 PROCEDURE — 99212 OFFICE O/P EST SF 10 MIN: CPT

## 2024-06-11 PROCEDURE — 3074F SYST BP LT 130 MM HG: CPT | Performed by: FAMILY MEDICINE

## 2024-06-11 PROCEDURE — 3078F DIAST BP <80 MM HG: CPT | Performed by: FAMILY MEDICINE

## 2024-06-11 PROCEDURE — 99203 OFFICE O/P NEW LOW 30 MIN: CPT | Performed by: FAMILY MEDICINE

## 2024-06-11 RX ORDER — IPRATROPIUM BROMIDE AND ALBUTEROL SULFATE 2.5; .5 MG/3ML; MG/3ML
3 SOLUTION RESPIRATORY (INHALATION)
COMMUNITY
Start: 2024-04-02

## 2024-06-11 RX ORDER — SUMATRIPTAN 20 MG/1
1 SPRAY NASAL PRN
COMMUNITY
Start: 2024-04-02

## 2024-06-11 ASSESSMENT — ENCOUNTER SYMPTOMS
CHILLS: 0
HEMOPTYSIS: 0
PND: 0
CLAUDICATION: 0
SHORTNESS OF BREATH: 0
COUGH: 0
FEVER: 0
ORTHOPNEA: 0
WHEEZING: 0
SPUTUM PRODUCTION: 0
PALPITATIONS: 0

## 2024-06-11 ASSESSMENT — FIBROSIS 4 INDEX: FIB4 SCORE: 0.77

## 2024-06-11 NOTE — PROGRESS NOTES
[facility] INITIAL VISIT  06/11/24     Kristin Balderrama is a 33 y.o. female  who has been referred for vascular medicine evaluation and management for possible vasc etiology of polyneuropathy  Referring provider: Trini Kaiser A.P.R.*     Subjective      BLE pain:  INITIAL VISIT HISTORY: seen at  and multiple visits to ED for ongoing complaints of RLE pain.  No prior hx of falls or trauma.  Had prior eval with ortho and pending MRI.  Currently pending with neurology and ongoing care with pain , currently on gabapentin for pain relief but not helpful.  Denies associated skin rash, petechiae.  Denies know hx of vasculitis or other vascular disease.   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  No prior VR duplex.   JOSHUA normal 2020    POTS: seeing cardiology and on ivadrabine and metoprolol.    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    Diabetes ([facility]) 11/14/2023    Inguinal hernia 11/14/2023    Myopia of both eyes 08/29/2023    Cervical radiculopathy 08/17/2023    S/p left hip fracture 01/31/2023    Postural orthostatic tachycardia syndrome 01/28/2023    Hip fracture, left, closed, initial encounter ([facility]) 01/27/2023    Moderate persistent asthma without complication 01/27/2023    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    Hypermobility syndrome 11/10/2022    Kidney stone 10/31/2022    Insomnia 10/31/2022    Migraine 10/10/2022    Heart murmur  10/10/2022    Right inguinal hernia 10/09/2022    Migraine, hemiplegic 07/27/2022    Irritable bowel syndrome 07/27/2022    Hemiplegic 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, not intractable 07/27/2022    Hidradenitis axillaris 07/10/2022    Vision changes 06/28/2022    Palpitations 05/13/2022    Inappropriate sinus tachycardia (HCC) 09/24/2021    Psychogenic disorder 06/24/2021    Diplopia 06/23/2021    Failure to thrive in adult 06/21/2021    Acute bilateral low back pain with bilateral sciatica 06/16/2021    Normal pressure hydrocephalus (HCC) 06/04/2021    GIB (gastrointestinal bleeding) 05/24/2021    Uncomplicated asthma 04/15/2021    Blood per rectum 04/15/2021    Bilateral hand pain 04/05/2021    Suprapubic catheter dysfunction ([facility]) 02/05/2021    UTI (urinary tract infection) 10/11/2020    Prolonged Q-T interval on ECG 08/27/2020    Dry eyes 08/18/2020    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[facility]) 08/15/2020    Chronic obstructive lung disease ([facility]) 04/20/2020    Schizoaffective disorder, depressive type ([facility]) 03/02/2020    PTSD (post-traumatic stress disorder) 03/02/2020    IIH (idiopathic intracranial hypertension) 02/27/2020    Mixed stress and urge urinary incontinence 12/27/2019    Mild intermittent asthma without complication 12/16/2019    Immunocompromised ([facility]) 11/06/2019    Moderate persistent asthma with acute exacerbation 08/14/2019    Optic neuritis 05/27/2019    Supraventricular tachycardia ([facility]) 04/10/2019    Muscular deconditioning 07/14/2018    TONYA (obstructive sleep apnea) 01/09/2018    Functional diarrhea 01/05/2018    Mood disorder ([facility]) 10/28/2016    Schizophrenia ([facility]) 10/27/2016    Full incontinence of feces 07/13/2016    Polypharmacy 06/27/2016    Vitamin D deficiency 05/21/2016    Lactic acidosis 04/19/2016    Severe obesity (BMI >= 40) ([facility]) 03/07/2016    Scoliosis 03/07/2016    GERD (gastroesophageal reflux disease) 03/07/2016    Peripheral neuropathy and chronic pain syndrome ([facility]) 03/06/2016     Atypical chest pain 08/06/2015    Fatty liver disease, nonalcoholic 01/19/2015    Anxiety 12/16/2014    Hyperglycemia 09/05/2014    Urinary retention 04/02/2011    Dissociative identity disorder (HCC) 04/02/2011    Borderline personality disorder (HCC) 09/18/2010    AP (abdominal pain) 09/18/2010      Past Surgical History:   Procedure Laterality Date    NJ CYSTOSCOPY,INSERT URETERAL STENT Right 2/12/2024    Procedure: CYSTOSCOPY, WITH RIGHT URETEROSCOPY, WITH LITHOTRIPSY, WITH INSERTION OF RIGHT URETERAL STENT;  Surgeon: Josafat Roberson M.D.;  Location: [facility];  Service: Urology    NJ CYSTO/URETERO/PYELOSCOPY, DX Right 2/12/2024    Procedure: URETEROSCOPY;  Surgeon: Josafat Roberson M.D.;  Location: [facility];  Service: Urology    LASERTRIPSY Right 2/12/2024    Procedure: LITHOTRIPSY, USING LASER;  Surgeon: Josafat Roberson M.D.;  Location: [facility];  Service: Urology    INGUINAL HERNIA LAPAROSCOPIC Right 07/21/2023    Procedure: LAPAROSCOPIC RIGHT INGUINAL HERNIA REPAIR WITH MESH;  Surgeon: Joe Noyola M.D.;  Location: [facility];  Service: General    HERNIA REPAIR Right 07/21/2023    PB PERCUT FIX PBOX/NECK FEMUR FX Left 01/28/2023    Procedure: FIXATION, HIP, USING CANNULATED SCREW;  Surgeon: Noman Abdul M.D.;  Location: [facility];  Service: Orthopedics    NJ LAP,DIAGNOSTIC ABDOMEN  02/14/2022    Procedure: LAPAROSCOPY;  Surgeon: Seamus Pisano M.D.;  Location: SURGERY SAME DAY [facility];  Service: Gynecology    OVARIAN CYSTECTOMY Right 02/14/2022    Procedure: EXCISION, CYST, OVARY;  Surgeon: Seamus Pisano M.D.;  Location: SURGERY SAME DAY [facility];  Service: Gynecology    BOWEL STIMULATOR INSERTION  03/10/2021    Procedure: INSERTION, ELECTRODE LEADS AND PULSE GENERATOR, NEUROSTIMULATOR, SACRAL - REMOVAL OF INTERSTIM WITH REPLACEMENT OF SACRAL NEUROMODULATION DEVICE;  Surgeon: Joe Noyola M.D.;  Location: [facility];  Service:  General    MUSCLE BIOPSY Right 01/26/2017    Procedure: MUSCLE BIOPSY - THIGH;  Surgeon: Isidro Vigil M.D.;  Location: SURGERY [facility];  Service:     GASTROSCOPY WITH BALLOON DILATATION N/A 01/04/2017    Procedure: GASTROSCOPY WITH DILATATION;  Surgeon: Torres Vargas M.D.;  Location: SURGERY [facility];  Service:     BOWEL STIMULATOR INSERTION  07/13/2016    Procedure: BOWEL STIMULATOR INSERTION FOR PERMANENT INTERSTIM SACRAL IMPLANT;  Surgeon: Joe Noyola M.D.;  Location: SURGERY [facility];  Service:     RECOVERY  01/27/2016    Procedure: CATH LAB EP STUDY WITH SINUS NODE MODIFICATION LA;  Surgeon: Recoveryonly Surgery;  Location: SURGERY PRE-POST PROC UNIT [facility];  Service:     OTHER CARDIAC SURGERY  01/2016    cardiac ablation    NEURO DEST FACET L/S W/IG SNGL  04/21/2015    Performed by Reza Tabor at SURGERY [facility]    LUMBAR FUSION ANTERIOR  08/21/2012    Performed by SUSIE SAWANT at SURGERY [facility]    ALYSSA BY LAPAROSCOPY  08/29/2010    Performed by SHAYY JOHNS at SURGERY [facility]S    LAMINOTOMY      OTHER ABDOMINAL SURGERY      TONSILLECTOMY      tonsillectomy      Family History   Problem Relation Age of Onset    Hypertension Mother     Sleep Apnea Mother     Heart Disease Mother     Other Mother         hypothryod    Hypertension Maternal Uncle     Heart Disease Maternal Grandmother     Hypertension Maternal Grandmother     No Known Problems Sister     Other Sister         Narcolepsy;fibromyalsia;bone;nerve    Genitourinary () Problems Sister         endometriosis      Current Outpatient Medications on File Prior to Visit   Medication Sig Dispense Refill    ipratropium-albuterol (DUONEB) 0.5-2.5 (3) MG/3ML nebulizer solution Take 3 mL by nebulization.      sumatriptan (IMITREX) 20 MG/ACT nasal spray Administer 1 Spray into affected nostril(S) as needed.      loperamide (IMODIUM) 2 MG Cap Take 1 Capsule by mouth 4 times a day as needed for  Diarrhea. 30 Capsule 0    methylPREDNISolone (MEDROL DOSEPAK) 4 MG Tablet Therapy Pack Follow schedule on package instructions. 21 Tablet 0    BORIC ACID VAGINAL VA Insert 1 Suppository into the vagina as needed (For yeast). (OTC)      ondansetron (ZOFRAN ODT) 4 MG TABLET DISPERSIBLE Take 1 Tablet by mouth every 6 hours as needed for Nausea/Vomiting. 10 Tablet 0    adalimumab (HUMIRA) 80 MG/0.8ML injection Inject 80 mg under the skin every 14 days. Last injection was on 3/14/2024      ivabradine (CORLANOR) 7.5 MG Tab tablet Take 1 Tablet by mouth 2 times a day with meals. 60 Tablet 11    amLODIPine (NORVASC) 5 MG Tab Take 5 mg by mouth every day.      Rimegepant Sulfate (NURTEC) 75 MG TABLET DISPERSIBLE Take 75 mg by mouth 1 time a day as needed (migraine).      gabapentin (NEURONTIN) 400 MG Cap Take 1,200 mg by mouth 3 times a day. 3 capsules=1200mg      acetaminophen (TYLENOL) 500 MG Tab Take 1,000 mg by mouth 1 time a day as needed for Mild Pain. Indications: Pain      BORIC ACID VAGINAL VA Insert 1 Suppository into the vagina 1 time a day as needed (yeast infection).      docusate sodium 250 MG Cap Take 250 mg by mouth 2 times a day.      sodium chloride (SALT) 1 GM Tab TAKE 1 TABLET BY MOUTH THREE TIMES A DAY WITH MEALS ( NOT COVERED/INS ) (Patient taking differently: Take 1 g by mouth 3 times a day.) 90 Tablet 2    omeprazole (PRILOSEC) 20 MG delayed-release capsule Take 20 mg by mouth 2 times a day.      lamoTRIgine (LAMICTAL) 25 MG Tab Take 50 mg by mouth 2 times a day. 50 mg = 2 tablets      Galcanezumab-gnlm (EMGALITY) 120 MG/ML Solution Auto-injector Inject 120 mg under the skin every 30 (thirty) days. On the 27th of every month, last dose was taken on 2/27/2024      topiramate (TOPAMAX) 50 MG tablet Take 50 mg by mouth 2 times a day.      metoprolol SR (TOPROL XL) 25 MG TABLET SR 24 HR Take 1 Tablet by mouth every day. 90 Tablet 3    tizanidine (ZANAFLEX) 4 MG Tab Take 4 mg by mouth 2 times a day.       "ziprasidone (GEODON) 40 MG Cap Take 1 capsule by mouth at bedtime. 30 Capsule 2    diphenhydrAMINE (BENADRYL) 25 MG Tab Take 25-50 mg by mouth 2 times a day. 25 mg in the morning, 50 mg in the evening      Melatonin 10 MG Tab Take 10 mg by mouth at bedtime.      etonogestrel (NEXPLANON) 68 MG Implant implant Inject 68 mg under the skin see administration instructions. Pt reports she is not sure when she had this medications injected last, pt reports she still has it in her arm  Every 3 years to change, thinks it was placed 2021       No current facility-administered medications on file prior to visit.      Allergies   Allergen Reactions    Cefdinir Shortness of Breath and Itching     Tolerated 1/18/17  Tolerates ceftriaxone  Tolerated augmentin 8/2019     Depakote [Divalproex Sodium] Unspecified     Muscle spasms/muscle pain and weakness      Depakote [Divalproex Sodium]      Muscle spasms/muscle pain and weakness    Doxycycline Anaphylaxis and Vomiting     Other reaction(s): pustules/blisters  Other reaction(s): stomach pain    Montelukast      Cardiac effusion    Montelukast [Singulair] Unspecified     Cardiac effusion    Vancomycin Itching     Pt becomes flushed in face and chest.   RXN=7/10/16    Wound Dressing Adhesive Rash     By pt report-\"removes skin totally off\"    Amitriptyline Unspecified     Headaches      Amoxicillin Rash          Aripiprazole Unspecified     Headaches/muscle twitching      Aripiprazole [Abilify] Unspecified     Headaches/muscle twitching      Clindamycin Nausea         Other reaction(s): nausea stomach pain    Erythromycin Rash     .  Other reaction(s): nausea stomach pain    Flomax [Tamsulosin Hydrochloride] Swelling    Hydromorphone      Other reaction(s): vomiting    Levaquin Unspecified     Severe muscle cramps in legs  RXN=unknown  Other reaction(s): leg muscle cramps    Metformin Unspecified     Increased lactic acid     Other reaction(s): itching and rash/nausea vomiting    " "Sulfa Drugs Hives, Itching, Myalgia and Unspecified     Muscle pain and weakness    Other reaction(s): unknown    Tamsulosin Swelling     Swelling of legs    Tape Rash     Tears skin off  coban with Tegaderm tape ok intermittently  RXN=ongoing    Sulfamethoxazole W-Trimethoprim Rash    Ampicillin Rash     Pt reports that she received a rash     Ciprofloxacin Rash          Keflex Rash     Pt states she gets a rash with this medication  Tolerates ceftriaxone  Can take with Benadryl    Levofloxacin Unspecified     Leg muscle cramps    Metronidazole Rash     \"Vision problems\"  Other reaction(s): vision problems    Penicillins Hives     Can take with Benadryl    Valproic Acid Rash     .      Social History     Tobacco Use    Smoking status: Never    Smokeless tobacco: Never   Vaping Use    Vaping status: Never Used   Substance Use Topics    Alcohol use: No     Alcohol/week: 0.0 oz    Drug use: Never     Frequency: 7.0 times per week     DIET AND EXERCISE:  Weight Change:stable   Diet: common adult  Exercise: no regular exercise program     Review of Systems   Constitutional:  Negative for chills, fever and malaise/fatigue.   Respiratory:  Negative for cough, hemoptysis, sputum production, shortness of breath and wheezing.    Cardiovascular:  Negative for chest pain, palpitations, orthopnea, claudication, leg swelling and PND.          Objective    Vitals:    06/11/24 1458   BP: 120/71   BP Location: Left arm   Patient Position: Sitting   BP Cuff Size: Thigh   Pulse: 80   Weight: 119 kg (262 lb)   Height: 1.626 m (5' 4\")      BP Readings from Last 4 Encounters:   06/11/24 120/71   06/05/24 (!) 151/95   06/03/24 116/84   05/28/24 133/89      Body mass index is 44.97 kg/m².   Wt Readings from Last 4 Encounters:   06/11/24 119 kg (262 lb)   06/04/24 118 kg (261 lb)   06/03/24 110 kg (242 lb)   05/28/24 117 kg (258 lb)      Physical Exam  Constitutional:       General: She is not in acute distress.     Appearance: Normal " "appearance. She is not diaphoretic.   HENT:      Head: Normocephalic and atraumatic.   Eyes:      Conjunctiva/sclera: Conjunctivae normal.   Cardiovascular:      Rate and Rhythm: Normal rate and regular rhythm.      Pulses:           Carotid pulses are 2+ on the right side and 2+ on the left side.       Radial pulses are 2+ on the right side and 2+ on the left side.        Dorsalis pedis pulses are 2+ on the right side and 2+ on the left side.        Posterior tibial pulses are 2+ on the right side and 2+ on the left side.      Heart sounds: Normal heart sounds.      Comments: Stemmer's negative bilat   No abd wall varicosities     Spider telangectasia:       RLE:  None      LLE: none   Varicosities:           RLE: none      LLE: none   Corona phlebectatica:      RLE:  None        LLE:  None   Cording:         RLE:  None     LLE: None     No stasis dermatitis or pigmentation  No lipodermatosclerosis or atrophe cheri  No healed or current VLUs    Pulmonary:      Effort: Pulmonary effort is normal.      Breath sounds: Normal breath sounds.   Musculoskeletal:      Right lower leg: No edema.      Left lower leg: No edema.   Skin:     General: Skin is warm and dry.   Neurological:      Mental Status: She is alert and oriented to person, place, and time.      Cranial Nerves: No cranial nerve deficit.      Gait: Gait is intact.   Psychiatric:         Mood and Affect: Mood and affect normal.          DATA REVIEW    Lab Results   Component Value Date/Time    CHOLSTRLTOT 172 01/20/2021 05:33 AM    LDL 98 01/20/2021 05:33 AM    HDL 48 01/20/2021 05:33 AM    TRIGLYCERIDE 130 01/20/2021 05:33 AM       No results found for: \"LIPOPROTA\"   No results found for: \"APOB\"   Lab Results   Component Value Date/Time    CRPHIGHSEN 1.3 06/29/2019 11:35 AM        Lab Results   Component Value Date/Time    SODIUM 141 04/15/2024 03:25 PM    POTASSIUM 3.8 04/15/2024 03:25 PM    CHLORIDE 109 04/15/2024 03:25 PM    CO2 12 (L) 04/15/2024 03:25 " "PM    GLUCOSE 174 (H) 04/15/2024 03:25 PM    BUN 8 04/15/2024 03:25 PM    CREATININE 0.85 04/15/2024 03:25 PM    CREATININE 0.75 (L) 07/20/2010 11:00 AM    BUNCREATRAT 15.6 12/31/2023 09:47 AM    BUNCREATRAT 19 07/20/2010 11:00 AM    GLOMRATE >59 07/20/2010 11:00 AM     Lab Results   Component Value Date/Time    ALKPHOSPHAT 76 04/15/2024 03:25 PM    ASTSGOT 21 04/15/2024 03:25 PM    ALTSGPT 24 04/15/2024 03:25 PM    TBILIRUBIN 0.6 04/15/2024 03:25 PM       Lab Results   Component Value Date/Time    HBA1C 5.2 07/17/2023 07:56 AM       No results found for: \"MICROALBCALC\", \"MALBCRT\", \"MALBEXCR\", \"DMAVXX33\", \"MICROALBUR\", \"MICRALB\", \"UMICROALBUM\", \"MICROALBTIM\"      Cryoglobulin QL  CRYOGLOBULIN QL SERUM RFLX  Collected: 02/25/17 0437   Result status: Final   Resulting lab: [facility]   Reference range: NEG 72Hour   Value: NEG 72Hour   Comment: INTERPRETIVE INFORMATION: Cryoglobulin Qualitative  with Reflex     CV IMAGING:    Carotid 2020  Normal study.     JOSHUA 2020  1) Normal Right JOSHUA 1.30 Left JOSHUA 1.23     Echo 2020  Normal left ventricular systolic function.  The left ventricular ejection fraction is visually estimated to be 55%.  Normal right ventricular size and systolic function.  Right heart pressures are normal.    Biotel 2022  1.  Sinus rhythm; average heart rate 71 bpm, range  bpm.   2.  Rare premature atrial complexes, 0.02% occurrence.   3.  Rare premature ventricular complexes, 0.01% occurrence.   4.  No atrial fibrillation, significant pauses, heart block or sustained arrhythmias.   5.  2 symptomatic events reported associated with sinus rhythm, normal heart rate 75-77 bpm     BLE venous 2022  No deep venous thrombosis.     LLE venous 2/2023  No deep venous thrombosis     MR Lspoine 1/2024  Postoperative changes at L4-5. There is no significant canal or foraminal stenosis.       MR Tspine 1/2024  MRI of the thoracic spine without contrast within normal limits.     RLE venous 5/2024  No deep venous " "thrombosis in right lower extremity.     CT a/p 5/2024  1.  No acute abnormality detected.  2.  2 mm right renal calculus.    CT head 6/2024  1. No acute intracranial abnormality.       VASCULAR PROCEDURES: None          Medical Decision Making:  Today's Assessment / Status / Plan:     1. Chronic venous insufficiency  US-EXTREMITY VENOUS LOWER BILAT      2. Postural orthostatic tachycardia syndrome (POTS)        3. Class 3 severe obesity with serious comorbidity and body mass index (BMI) of 40.0 to 44.9 in adult, unspecified obesity type (HCC)        4. Hypermobility syndrome        5. Other polyneuropathy           BLE pain - no indications of vascular etiologies on exam - normal pulses, no overt varicosities or edema, no skin rash  Plan:  - defer further w/u to PCP and/or pain mgmt for non-vasc etiologies  - agree that neurology for NCS/EMG is appropriate     Etiology of Established CVD if Present:  none     1) POTS - apparently stable   Plan: defer ongoing to cardiology, neurology   - continue current meds     2) possible venous disease - no known hx of DVT   No indications of lymphedema or lipedema   Plan:  - check venous reflux duplex to eval for possible options for interventions   - increase walking, avoid prolonged standing   - elevated legs while sitting and sleeping above heart level  - emphasized need for reduction of central adiposity to reduce extrinsic compression of the low-pressure IVC system to improve venous return and reduce distal venous pressure in legs - reviewed dietary changes and monitor weight and waist circumference  - side sleeping with body pillow may improve lymphatic and venous return by reducing  extrinsic compression on IVC during sleep  - reduce sodium (\"salt\") in diet to less than 1,500mg/day   - continue daily moisturizing lotion (such as Gold [employer] Diabetic Foot Cream)    BLOOD PRESSURE MANAGEMENT  Office BP Goal ACC/AHA (2017) goal <130/80  Home BP at goal:  yes  Office BP at " goal:  yes  24h ABPM:  not ordered to date   RDN candidate? NO  Contributing factors: obesity, metabolic consequence of certain psych meds   Plan:   - start/continue home BP monitoring, reviewed correct technique, provide BP log and instructions  Medications:  - continue metoprolol 25mg ER daily   - continue amlodipine 5mg daily     GLYCEMIC MANAGEMENT Normal    ANTITHROMBOTIC THERAPY not indicated     LIFESTYLE INTERVENTIONS  TOBACCO: continued complete avoidance of all tobacco products   PHYSICAL ACTIVITY: >150min/week of mod-intensity activity or as much as tolerated  NUTRITION: Mediterranean, reduce Na to 1500mg/day, and Weight reduction - reduce caloric intake by 300 - 500 kcal/day to achieve 1-2lb weight loss per week    ETOH: limit to 1 or less standard drinks/day  WT MGMT: - focus on 5-7% reduction over time    OTHER:      # reported hx of hypermobility EDS.  No indications of prior hx of vascular aneurysms, dissections, rupture.  Last echo with normal Ao root   Plan:  - consider repeat echo age 39, defer to cardioloy    Studies to Be Obtained: BLE VR duplex - will call results   Labs to Be Obtained: none     Follow up in: angel Guzman M.D.   [facility]  [facility]  [phone]

## 2024-06-13 ENCOUNTER — OFFICE VISIT (OUTPATIENT)
Dept: URGENT CARE | Facility: CLINIC | Age: 34
End: 2024-06-13
Payer: MEDICARE

## 2024-06-13 VITALS
OXYGEN SATURATION: 97 % | WEIGHT: 264.1 LBS | HEIGHT: 64 IN | HEART RATE: 80 BPM | RESPIRATION RATE: 20 BRPM | BODY MASS INDEX: 45.09 KG/M2 | TEMPERATURE: 98.6 F | DIASTOLIC BLOOD PRESSURE: 80 MMHG | SYSTOLIC BLOOD PRESSURE: 124 MMHG

## 2024-06-13 DIAGNOSIS — L02.413 ABSCESS OF RIGHT ARM: ICD-10-CM

## 2024-06-13 PROCEDURE — 3074F SYST BP LT 130 MM HG: CPT | Performed by: NURSE PRACTITIONER

## 2024-06-13 PROCEDURE — 99214 OFFICE O/P EST MOD 30 MIN: CPT | Performed by: NURSE PRACTITIONER

## 2024-06-13 PROCEDURE — 3079F DIAST BP 80-89 MM HG: CPT | Performed by: NURSE PRACTITIONER

## 2024-06-13 RX ORDER — AMOXICILLIN AND CLAVULANATE POTASSIUM 875; 125 MG/1; MG/1
1 TABLET, FILM COATED ORAL 2 TIMES DAILY
Qty: 14 TABLET | Refills: 0 | Status: SHIPPED | OUTPATIENT
Start: 2024-06-13 | End: 2024-06-20

## 2024-06-13 ASSESSMENT — FIBROSIS 4 INDEX: FIB4 SCORE: 0.77

## 2024-06-14 ASSESSMENT — ENCOUNTER SYMPTOMS
FEVER: 0
CHILLS: 0

## 2024-06-15 NOTE — PROGRESS NOTES
Subjective:   Kristin Balderrama is a 33 y.o. female who presents for Abscess (Abscess on Right arm near the elbow. The patient stated it exploded right in her face today.)      HPI  Patient is a 33-year-old female presents urgent care for evaluation of a suspected abscess in the right inner arm.  Patient states that it is spontaneously draining purulent discharge today.  She has redness and tenderness.  No fevers or chills.  Has not tried any medication for the symptoms.  Review of Systems   Constitutional:  Negative for chills and fever.   Skin:         Abscess right arm          Medications:    acetaminophen Tabs  adalimumab  amLODIPine Tabs  amoxicillin-clavulanate Tabs  BORIC ACID VAGINAL VA  Corlanor Tabs  diphenhydrAMINE Tabs  docusate sodium Caps  Emgality Soaj  etonogestrel Impl  gabapentin Caps  ipratropium-albuterol  lamoTRIgine Tabs  loperamide Caps  Melatonin Tabs  methylPREDNISolone Tbpk  metoprolol SR Tb24  Nurtec Tbdp  omeprazole  ondansetron Tbdp  sodium chloride Tabs  sumatriptan  tizanidine Tabs  topiramate  ziprasidone Caps    Allergies: Cefdinir, Depakote [divalproex sodium], Depakote [divalproex sodium], Doxycycline, Montelukast, Montelukast [singulair], Vancomycin, Wound dressing adhesive, Amitriptyline, Amoxicillin, Aripiprazole, Aripiprazole [abilify], Clindamycin, Erythromycin, Flomax [tamsulosin hydrochloride], Hydromorphone, Levaquin, Metformin, Sulfa drugs, Tamsulosin, Tape, Sulfamethoxazole w-trimethoprim, Ampicillin, Ciprofloxacin, Keflex, Levofloxacin, Metronidazole, Penicillins, and Valproic acid    Problem List: Kristin Balderrama does not have any pertinent problems on file.    Surgical History:  Past Surgical History:   Procedure Laterality Date    UT CYSTOSCOPY,INSERT URETERAL STENT Right 2/12/2024    Procedure: CYSTOSCOPY, WITH RIGHT URETEROSCOPY, WITH LITHOTRIPSY, WITH INSERTION OF RIGHT URETERAL STENT;  Surgeon: Josafat Roberson M.D.;  Location: SURGERY [facility];   Service: Urology    HI CYSTO/URETERO/PYELOSCOPY, DX Right 2/12/2024    Procedure: URETEROSCOPY;  Surgeon: Josafat Roberson M.D.;  Location: [facility];  Service: Urology    LASERTRIPSY Right 2/12/2024    Procedure: LITHOTRIPSY, USING LASER;  Surgeon: Josafat Roberson M.D.;  Location: [facility];  Service: Urology    INGUINAL HERNIA LAPAROSCOPIC Right 07/21/2023    Procedure: LAPAROSCOPIC RIGHT INGUINAL HERNIA REPAIR WITH MESH;  Surgeon: Joe Noyola M.D.;  Location: [facility];  Service: General    HERNIA REPAIR Right 07/21/2023    PB PERCUT FIX PBOX/NECK FEMUR FX Left 01/28/2023    Procedure: FIXATION, HIP, USING CANNULATED SCREW;  Surgeon: Noman Abdul M.D.;  Location: [facility];  Service: Orthopedics    HI LAP,DIAGNOSTIC ABDOMEN  02/14/2022    Procedure: LAPAROSCOPY;  Surgeon: Seamus Pisano M.D.;  Location: SURGERY SAME DAY [facility];  Service: Gynecology    OVARIAN CYSTECTOMY Right 02/14/2022    Procedure: EXCISION, CYST, OVARY;  Surgeon: Seamus Pisano M.D.;  Location: SURGERY SAME DAY [facility];  Service: Gynecology    BOWEL STIMULATOR INSERTION  03/10/2021    Procedure: INSERTION, ELECTRODE LEADS AND PULSE GENERATOR, NEUROSTIMULATOR, SACRAL - REMOVAL OF INTERSTIM WITH REPLACEMENT OF SACRAL NEUROMODULATION DEVICE;  Surgeon: Joe Noyola M.D.;  Location: [facility];  Service: General    MUSCLE BIOPSY Right 01/26/2017    Procedure: MUSCLE BIOPSY - THIGH;  Surgeon: Isidro Vigil M.D.;  Location: [facility];  Service:     GASTROSCOPY WITH BALLOON DILATATION N/A 01/04/2017    Procedure: GASTROSCOPY WITH DILATATION;  Surgeon: Torres Vargas M.D.;  Location: [facility];  Service:     BOWEL STIMULATOR INSERTION  07/13/2016    Procedure: BOWEL STIMULATOR INSERTION FOR PERMANENT INTERSTIM SACRAL IMPLANT;  Surgeon: Joe Noyola M.D.;  Location: [facility];  Service:     RECOVERY  01/27/2016    Procedure: CATH LAB EP STUDY  "WITH SINUS NODE MODIFICATION ABHINAV;  Surgeon: Recoveryonly Surgery;  Location: SURGERY [facility] PROC [facility];  Service:     OTHER CARDIAC SURGERY  01/2016    cardiac ablation    NEURO DEST FACET L/S W/IG SNGL  04/21/2015    Performed by Reza Tabor at SURGERY [facility]    LUMBAR FUSION ANTERIOR  08/21/2012    Performed by SUSIE SAWANT [facility]    ALYSSA BY LAPAROSCOPY  08/29/2010    Performed by SHAYY JOHNS at SURGERY [facility]    LAMINOTOMY      OTHER ABDOMINAL SURGERY      TONSILLECTOMY      tonsillectomy       Past Social Hx: Kristin Balderrama  reports that she has never smoked. She has never used smokeless tobacco. She reports that she does not drink alcohol and does not use drugs.     Past Family Hx:  Kristin Balderrama family history includes Genitourinary () Problems in her sister; Heart Disease in her maternal grandmother and mother; Hypertension in her maternal grandmother, maternal uncle, and mother; No Known Problems in her sister; Other in her mother and sister; Sleep Apnea in her mother.     Problem list, medications, and allergies reviewed by myself today in Epic.     Objective:     /80 (BP Location: Right arm, Patient Position: Sitting, BP Cuff Size: Large adult)   Pulse 80   Temp 37 °C (98.6 °F) (Temporal)   Resp 20   Ht 1.626 m (5' 4\")   Wt 120 kg (264 lb 1.6 oz)   SpO2 97%   BMI 45.33 kg/m²     Physical Exam  Constitutional:       Appearance: Normal appearance. She is not ill-appearing or toxic-appearing.   HENT:      Head: Normocephalic.      Right Ear: External ear normal.      Left Ear: External ear normal.      Nose: Nose normal.      Mouth/Throat:      Lips: Pink.   Eyes:      General: Lids are normal.   Pulmonary:      Effort: Pulmonary effort is normal. No accessory muscle usage.   Musculoskeletal:      Cervical back: Full passive range of motion without pain.   Skin:     Findings: Abscess present.             Comments: 3mm " abscess with induration no fluctuation from tissue erythematous   Neurological:      Mental Status: She is alert and oriented to person, place, and time.   Psychiatric:         Mood and Affect: Mood normal.         Thought Content: Thought content normal.         Assessment/Plan:     Diagnosis and associated orders:     1. Abscess of right arm  amoxicillin-clavulanate (AUGMENTIN) 875-125 MG Tab         Comments/MDM:     I personally reviewed prior external notes and prior test results pertinent to today's visit.  Evidently abscess spontaneously drained today I&D not indicated will treat with antibiotics patient having multiple drug allergies to antibiotics has tolerated Augmentin in the past  Discussed management options, risks and benefits, and alternatives to treatment plan agreed upon.   Red flags discussed and indications to immediately call 911 or present to the Emergency Department.   Supportive care, differential diagnoses, and indications for immediate follow-up discussed with patient.    Patient expresses understanding and agrees to plan. Patient denies any other questions or concerns.              Please note that this dictation was created using voice recognition software. I have made a reasonable attempt to correct obvious errors, but I expect that there are errors of grammar and possibly content that I did not discover before finalizing the note.    This note was electronically signed by Felice PUGA.

## 2024-06-23 ENCOUNTER — APPOINTMENT (OUTPATIENT)
Dept: RADIOLOGY | Facility: MEDICAL CENTER | Age: 34
End: 2024-06-23
Attending: EMERGENCY MEDICINE
Payer: MEDICARE

## 2024-06-23 ENCOUNTER — HOSPITAL ENCOUNTER (EMERGENCY)
Facility: MEDICAL CENTER | Age: 34
End: 2024-06-23
Attending: EMERGENCY MEDICINE
Payer: MEDICARE

## 2024-06-23 VITALS
WEIGHT: 264 LBS | TEMPERATURE: 99.7 F | RESPIRATION RATE: 23 BRPM | HEIGHT: 64 IN | HEART RATE: 116 BPM | SYSTOLIC BLOOD PRESSURE: 150 MMHG | OXYGEN SATURATION: 95 % | BODY MASS INDEX: 45.07 KG/M2 | DIASTOLIC BLOOD PRESSURE: 82 MMHG

## 2024-06-23 DIAGNOSIS — E87.20 LACTIC ACIDOSIS: ICD-10-CM

## 2024-06-23 DIAGNOSIS — R06.02 SHORTNESS OF BREATH: ICD-10-CM

## 2024-06-23 DIAGNOSIS — R10.84 GENERALIZED ABDOMINAL PAIN: ICD-10-CM

## 2024-06-23 LAB
ALBUMIN SERPL BCP-MCNC: 4.2 G/DL (ref 3.2–4.9)
ALBUMIN/GLOB SERPL: 1.8 G/DL
ALP SERPL-CCNC: 79 U/L (ref 30–99)
ALT SERPL-CCNC: 31 U/L (ref 2–50)
ANION GAP SERPL CALC-SCNC: 17 MMOL/L (ref 7–16)
APPEARANCE UR: CLEAR
AST SERPL-CCNC: 21 U/L (ref 12–45)
BACTERIA #/AREA URNS HPF: NEGATIVE /HPF
BASOPHILS # BLD AUTO: 0.7 % (ref 0–1.8)
BASOPHILS # BLD: 0.03 K/UL (ref 0–0.12)
BILIRUB SERPL-MCNC: 0.3 MG/DL (ref 0.1–1.5)
BILIRUB UR QL STRIP.AUTO: NEGATIVE
BUN SERPL-MCNC: 10 MG/DL (ref 8–22)
CALCIUM ALBUM COR SERPL-MCNC: 8.9 MG/DL (ref 8.5–10.5)
CALCIUM SERPL-MCNC: 9.1 MG/DL (ref 8.5–10.5)
CHLORIDE SERPL-SCNC: 106 MMOL/L (ref 96–112)
CO2 SERPL-SCNC: 16 MMOL/L (ref 20–33)
COLOR UR: YELLOW
CREAT SERPL-MCNC: 0.73 MG/DL (ref 0.5–1.4)
EKG IMPRESSION: NORMAL
EOSINOPHIL # BLD AUTO: 0.19 K/UL (ref 0–0.51)
EOSINOPHIL NFR BLD: 4.3 % (ref 0–6.9)
EPI CELLS #/AREA URNS HPF: NEGATIVE /HPF
ERYTHROCYTE [DISTWIDTH] IN BLOOD BY AUTOMATED COUNT: 41.4 FL (ref 35.9–50)
FLUAV RNA SPEC QL NAA+PROBE: NEGATIVE
FLUBV RNA SPEC QL NAA+PROBE: NEGATIVE
GFR SERPLBLD CREATININE-BSD FMLA CKD-EPI: 110 ML/MIN/1.73 M 2
GLOBULIN SER CALC-MCNC: 2.4 G/DL (ref 1.9–3.5)
GLUCOSE SERPL-MCNC: 178 MG/DL (ref 65–99)
GLUCOSE UR STRIP.AUTO-MCNC: NEGATIVE MG/DL
HCG SERPL QL: NEGATIVE
HCT VFR BLD AUTO: 39.7 % (ref 37–47)
HGB BLD-MCNC: 13.8 G/DL (ref 12–16)
HYALINE CASTS #/AREA URNS LPF: NORMAL /LPF
IMM GRANULOCYTES # BLD AUTO: 0.08 K/UL (ref 0–0.11)
IMM GRANULOCYTES NFR BLD AUTO: 1.8 % (ref 0–0.9)
KETONES UR STRIP.AUTO-MCNC: NEGATIVE MG/DL
LACTATE SERPL-SCNC: 2.9 MMOL/L (ref 0.5–2)
LACTATE SERPL-SCNC: 4.6 MMOL/L (ref 0.5–2)
LACTATE SERPL-SCNC: 5.8 MMOL/L (ref 0.5–2)
LEUKOCYTE ESTERASE UR QL STRIP.AUTO: ABNORMAL
LYMPHOCYTES # BLD AUTO: 1.31 K/UL (ref 1–4.8)
LYMPHOCYTES NFR BLD: 29.7 % (ref 22–41)
MCH RBC QN AUTO: 31.9 PG (ref 27–33)
MCHC RBC AUTO-ENTMCNC: 34.8 G/DL (ref 32.2–35.5)
MCV RBC AUTO: 91.9 FL (ref 81.4–97.8)
MICRO URNS: ABNORMAL
MONOCYTES # BLD AUTO: 0.44 K/UL (ref 0–0.85)
MONOCYTES NFR BLD AUTO: 10 % (ref 0–13.4)
NEUTROPHILS # BLD AUTO: 2.36 K/UL (ref 1.82–7.42)
NEUTROPHILS NFR BLD: 53.5 % (ref 44–72)
NITRITE UR QL STRIP.AUTO: NEGATIVE
NRBC # BLD AUTO: 0 K/UL
NRBC BLD-RTO: 0 /100 WBC (ref 0–0.2)
PH UR STRIP.AUTO: 6.5 [PH] (ref 5–8)
PLATELET # BLD AUTO: 190 K/UL (ref 164–446)
PMV BLD AUTO: 11 FL (ref 9–12.9)
POTASSIUM SERPL-SCNC: 3.5 MMOL/L (ref 3.6–5.5)
PROT SERPL-MCNC: 6.6 G/DL (ref 6–8.2)
PROT UR QL STRIP: NEGATIVE MG/DL
RBC # BLD AUTO: 4.32 M/UL (ref 4.2–5.4)
RBC # URNS HPF: NORMAL /HPF
RBC UR QL AUTO: NEGATIVE
RSV RNA SPEC QL NAA+PROBE: NEGATIVE
SARS-COV-2 RNA RESP QL NAA+PROBE: NOTDETECTED
SODIUM SERPL-SCNC: 139 MMOL/L (ref 135–145)
SP GR UR STRIP.AUTO: 1.01
UROBILINOGEN UR STRIP.AUTO-MCNC: 0.2 MG/DL
WBC # BLD AUTO: 4.4 K/UL (ref 4.8–10.8)
WBC #/AREA URNS HPF: NORMAL /HPF

## 2024-06-23 PROCEDURE — 93005 ELECTROCARDIOGRAM TRACING: CPT

## 2024-06-23 PROCEDURE — 80053 COMPREHEN METABOLIC PANEL: CPT

## 2024-06-23 PROCEDURE — 0241U HCHG SARS-COV-2 COVID-19 NFCT DS RESP RNA 4 TRGT ED POC: CPT

## 2024-06-23 PROCEDURE — 700111 HCHG RX REV CODE 636 W/ 250 OVERRIDE (IP): Mod: UD | Performed by: EMERGENCY MEDICINE

## 2024-06-23 PROCEDURE — 81001 URINALYSIS AUTO W/SCOPE: CPT

## 2024-06-23 PROCEDURE — 87086 URINE CULTURE/COLONY COUNT: CPT

## 2024-06-23 PROCEDURE — 83605 ASSAY OF LACTIC ACID: CPT

## 2024-06-23 PROCEDURE — 96365 THER/PROPH/DIAG IV INF INIT: CPT | Mod: XU

## 2024-06-23 PROCEDURE — 99285 EMERGENCY DEPT VISIT HI MDM: CPT

## 2024-06-23 PROCEDURE — 93005 ELECTROCARDIOGRAM TRACING: CPT | Performed by: EMERGENCY MEDICINE

## 2024-06-23 PROCEDURE — 36415 COLL VENOUS BLD VENIPUNCTURE: CPT

## 2024-06-23 PROCEDURE — 87040 BLOOD CULTURE FOR BACTERIA: CPT

## 2024-06-23 PROCEDURE — 700117 HCHG RX CONTRAST REV CODE 255: Mod: UD | Performed by: EMERGENCY MEDICINE

## 2024-06-23 PROCEDURE — 74177 CT ABD & PELVIS W/CONTRAST: CPT

## 2024-06-23 PROCEDURE — 84703 CHORIONIC GONADOTROPIN ASSAY: CPT

## 2024-06-23 PROCEDURE — 94644 CONT INHLJ TX 1ST HOUR: CPT

## 2024-06-23 PROCEDURE — 700101 HCHG RX REV CODE 250: Performed by: EMERGENCY MEDICINE

## 2024-06-23 PROCEDURE — 71045 X-RAY EXAM CHEST 1 VIEW: CPT

## 2024-06-23 PROCEDURE — 85025 COMPLETE CBC W/AUTO DIFF WBC: CPT

## 2024-06-23 PROCEDURE — 700105 HCHG RX REV CODE 258: Mod: UD | Performed by: EMERGENCY MEDICINE

## 2024-06-23 PROCEDURE — 71275 CT ANGIOGRAPHY CHEST: CPT

## 2024-06-23 PROCEDURE — 96375 TX/PRO/DX INJ NEW DRUG ADDON: CPT | Mod: XU

## 2024-06-23 RX ORDER — MORPHINE SULFATE 4 MG/ML
6 INJECTION INTRAVENOUS ONCE
Status: COMPLETED | OUTPATIENT
Start: 2024-06-23 | End: 2024-06-23

## 2024-06-23 RX ORDER — SODIUM CHLORIDE, SODIUM LACTATE, POTASSIUM CHLORIDE, CALCIUM CHLORIDE 600; 310; 30; 20 MG/100ML; MG/100ML; MG/100ML; MG/100ML
1000 INJECTION, SOLUTION INTRAVENOUS ONCE
Status: COMPLETED | OUTPATIENT
Start: 2024-06-23 | End: 2024-06-23

## 2024-06-23 RX ORDER — SODIUM CHLORIDE, SODIUM LACTATE, POTASSIUM CHLORIDE, AND CALCIUM CHLORIDE .6; .31; .03; .02 G/100ML; G/100ML; G/100ML; G/100ML
30 INJECTION, SOLUTION INTRAVENOUS ONCE
Status: COMPLETED | OUTPATIENT
Start: 2024-06-23 | End: 2024-06-23

## 2024-06-23 RX ADMIN — SODIUM CHLORIDE, POTASSIUM CHLORIDE, SODIUM LACTATE AND CALCIUM CHLORIDE 1641 ML: 600; 310; 30; 20 INJECTION, SOLUTION INTRAVENOUS at 13:29

## 2024-06-23 RX ADMIN — Medication 15 MG/HR: at 11:08

## 2024-06-23 RX ADMIN — SODIUM CHLORIDE, POTASSIUM CHLORIDE, SODIUM LACTATE AND CALCIUM CHLORIDE 1000 ML: 600; 310; 30; 20 INJECTION, SOLUTION INTRAVENOUS at 16:49

## 2024-06-23 RX ADMIN — MORPHINE SULFATE 6 MG: 4 INJECTION INTRAVENOUS at 15:29

## 2024-06-23 RX ADMIN — CEFEPIME 2 G: 2 INJECTION, POWDER, FOR SOLUTION INTRAVENOUS at 14:34

## 2024-06-23 RX ADMIN — IOHEXOL 100 ML: 350 INJECTION, SOLUTION INTRAVENOUS at 14:31

## 2024-06-23 ASSESSMENT — PAIN SCALES - WONG BAKER
WONGBAKER_NUMERICALRESPONSE: DOESN'T HURT AT ALL
WONGBAKER_NUMERICALRESPONSE: DOESN'T HURT AT ALL

## 2024-06-23 ASSESSMENT — FIBROSIS 4 INDEX: FIB4 SCORE: 0.77

## 2024-06-23 NOTE — ED NOTES
The pt is alert and oriented. The pt has moderate work of breathing ans in on the nebulizer. No indicators of non-verbal pain. HR elevated

## 2024-06-23 NOTE — ED NOTES
The pt ambulated with a steady gait to the restroom. The pt reports pain upon movement. HR elevated and tachypnic. The pt states that she feels a little better after breathing treatment. Blood drawn from line. other vitals stable

## 2024-06-23 NOTE — ED NOTES
The pt is alert and oriented. The pt reports abd pain. HR decreasing but still elevated. Fluids infusing slowly

## 2024-06-23 NOTE — ED TRIAGE NOTES
Kristin Balderrama  33 y.o. female  Chief Complaint   Patient presents with    Shortness of Breath     Pt woke up with SOB acutely worse,  Tried nebulizer this morning with no relief.     Cough     Since she had surgery on 6/17  Productive       Pt is alert and oriented, speaking in full sentences, follows commands and responds appropriately to questions  Pt placed in Lobby. Pt educated on triage process. Pt encouraged to alert staff for any changes.

## 2024-06-23 NOTE — ED NOTES
Pt WOB very labored in triage. Charge RN called, pt roomed immediately.   Respiratory called to notify of critical pt, states on their way.   Pt brought to [facility] [address] via wheelchair and connected to monitor.

## 2024-06-23 NOTE — ED PROVIDER NOTES
"ER Provider Note    Scribed for Vikram Dunlap D.O. by Soo Wilson. 6/23/2024  11:04 AM    Primary Care Provider: Torres Brody M.D.    CHIEF COMPLAINT  Chief Complaint   Patient presents with    Shortness of Breath     Pt woke up with SOB acutely worse,  Tried nebulizer this morning with no relief.     Cough     Since she had surgery on 6/17  Productive       HPI/ROS    Kristin Balderrama is a 33 y.o. female who presents to the Emergency Department for acute difficulty breathing onset this morning. The patient states that she has been couging with \"foaming yellow stuff\".  The patient repoorts that she had a surgery on her abdomen on the 17th and has had a cough since then. The patient has a history of asthmna and uses an inhaler. She also adds that she used a Nebulizer this morning along with Tylenol and oxycodone with no alleviation. Denies any fever. The patient has a history of diabetes.     ROS as per HPI.    PAST MEDICAL HISTORY  Past Medical History:   Diagnosis Date    Abdominal pain     Anginal syndrome     random chest pain especially with tachycardia    Apnea, sleep     Arrhythmia     \"sinus tachycardia\", cariologist, Dr. Kumar; ablation 2/2016    Arthritis     osteo    ASTHMA     when around smoke    Back pain     Borderline personality disorder (HCC)     Breath shortness     with tachycardia    Bronchitis 02/08/2022    Last time was 12/21    Cardiac arrhythmia     Chickenpox     Chronic UTI 09/18/2010    Cough     Daytime sleepiness     Dental disorder 03/08/2021    infected tooth    Depression     Diabetes (HCC)     Diarrhea     incontinece    Disorder of thyroid     Hashimoto's    Fall     Fatigue     Frequent headaches     Gasping for breath     Gynecological disorder     PCOS    Headache(784.0)     Heart burn     Heart murmur     History of falling     Indigestion     Migraine     Mitochondrial disease (HCC)     Multiple personality disorder (HCC)     Nausea     Obesity     Other fatigue " "06/29/2020    Pain 08/15/2012    back, 7/10    Painful joint     PCOS (polycystic ovarian syndrome)     Pneumonia 2012 12/2020    POTS (postural orthostatic tachycardia syndrome)     Psychosis (HCC)     Ringing in ears     Scoliosis     Shortness of breath     O2 as needed    Sinus tachycardia 10/31/2013    Sleep apnea     CPAP \"pulmonary doctor took me off mid year 2016\"    Snoring     Supraventricular tachycardia (HCC) 4/10/2019    Tonsillitis     Transverse myelitis (HCC)     2/8/22: Per pt: not anymore    Tuberculosis     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    Urinary bladder disorder     Suprapubic cath. 2/8/22: Not anymore.     Urinary incontinence     Weakness     Wears glasses        SURGICAL HISTORY  Past Surgical History:   Procedure Laterality Date    IA CYSTOSCOPY,INSERT URETERAL STENT Right 2/12/2024    Procedure: CYSTOSCOPY, WITH RIGHT URETEROSCOPY, WITH LITHOTRIPSY, WITH INSERTION OF RIGHT URETERAL STENT;  Surgeon: Josafat Roberson M.D.;  Location: SURGERY [facility];  Service: Urology    IA CYSTO/URETERO/PYELOSCOPY, DX Right 2/12/2024    Procedure: URETEROSCOPY;  Surgeon: Josafat Roberson M.D.;  Location: SURGERY [facility];  Service: Urology    LASERTRIPSY Right 2/12/2024    Procedure: LITHOTRIPSY, USING LASER;  Surgeon: Josafat Roberson M.D.;  Location: SURGERY [facility];  Service: Urology    INGUINAL HERNIA LAPAROSCOPIC Right 07/21/2023    Procedure: LAPAROSCOPIC RIGHT INGUINAL HERNIA REPAIR WITH MESH;  Surgeon: Joe Noyola M.D.;  Location: SURGERY [facility];  Service: General    HERNIA REPAIR Right 07/21/2023    PB PERCUT FIX PBOX/NECK FEMUR FX Left 01/28/2023    Procedure: FIXATION, HIP, USING CANNULATED SCREW;  Surgeon: Noman Abdul M.D.;  Location: SURGERY [facility];  Service: Orthopedics    IA LAP,DIAGNOSTIC ABDOMEN  02/14/2022    Procedure: LAPAROSCOPY;  Surgeon: Seamus Pisano M.D.;  Location: SURGERY [facility] DAY [facility];  Service: Gynecology    OVARIAN CYSTECTOMY " Right 02/14/2022    Procedure: EXCISION, CYST, OVARY;  Surgeon: Seamus Pisano M.D.;  Location: SURGERY SAME DAY [facility];  Service: Gynecology    BOWEL STIMULATOR INSERTION  03/10/2021    Procedure: INSERTION, ELECTRODE LEADS AND PULSE GENERATOR, NEUROSTIMULATOR, SACRAL - REMOVAL OF INTERSTIM WITH REPLACEMENT OF SACRAL NEUROMODULATION DEVICE;  Surgeon: Joe Noyola M.D.;  Location: SURGERY [facility];  Service: General    MUSCLE BIOPSY Right 01/26/2017    Procedure: MUSCLE BIOPSY - THIGH;  Surgeon: Isidro Vigil M.D.;  Location: SURGERY [facility];  Service:     GASTROSCOPY WITH BALLOON DILATATION N/A 01/04/2017    Procedure: GASTROSCOPY WITH DILATATION;  Surgeon: Torres Vargas M.D.;  Location: SURGERY [facility];  Service:     BOWEL STIMULATOR INSERTION  07/13/2016    Procedure: BOWEL STIMULATOR INSERTION FOR PERMANENT INTERSTIM SACRAL IMPLANT;  Surgeon: Joe Noyola M.D.;  Location: SURGERY [facility];  Service:     RECOVERY  01/27/2016    Procedure: CATH LAB EP STUDY WITH SINUS NODE MODIFICATION LA;  Surgeon: Recoveryonly Surgery;  [place] [facility]  Service:     OTHER CARDIAC SURGERY  01/2016    cardiac ablation    NEURO DEST FACET L/S W/IG SNGL  04/21/2015    Performed by Reza Tabor at [facility] ORS    LUMBAR FUSION ANTERIOR  08/21/2012    Performed by SUSIE SAWANT at SURGERY [facility] ORS    ALYSSA BY LAPAROSCOPY  08/29/2010    Performed by SHAYY JOHNS at SURGERY [facility] ORS    LAMINOTOMY      OTHER ABDOMINAL SURGERY      TONSILLECTOMY      tonsillectomy       FAMILY HISTORY  Family History   Problem Relation Age of Onset    Hypertension Mother     Sleep Apnea Mother     Heart Disease Mother     Other Mother         hypothryod    Hypertension Maternal Uncle     Heart Disease Maternal Grandmother     Hypertension Maternal Grandmother     No Known Problems Sister     Other Sister         Narcolepsy;fibromyalsia;bone;nerve     Genitourinary () Problems Sister         endometriosis       SOCIAL HISTORY   reports that she has never smoked. She has never used smokeless tobacco. She reports that she does not drink alcohol and does not use drugs.    CURRENT MEDICATIONS  Previous Medications    ACETAMINOPHEN (TYLENOL) 500 MG TAB    Take 1,000 mg by mouth 1 time a day as needed for Mild Pain. Indications: Pain    ADALIMUMAB (HUMIRA) 80 MG/0.8ML INJECTION    Inject 80 mg under the skin every 14 days. Last injection was on 3/14/2024    AMLODIPINE (NORVASC) 5 MG TAB    Take 5 mg by mouth every day.    BORIC ACID VAGINAL VA    Insert 1 Suppository into the vagina 1 time a day as needed (yeast infection).    BORIC ACID VAGINAL VA    Insert 1 Suppository into the vagina as needed (For yeast). (OTC)    DIPHENHYDRAMINE (BENADRYL) 25 MG TAB    Take 25-50 mg by mouth 2 times a day. 25 mg in the morning, 50 mg in the evening    DOCUSATE SODIUM 250 MG CAP    Take 250 mg by mouth 2 times a day.    ETONOGESTREL (NEXPLANON) 68 MG IMPLANT IMPLANT    Inject 68 mg under the skin see administration instructions. Pt reports she is not sure when she had this medications injected last, pt reports she still has it in her arm  Every 3 years to change, thinks it was placed 2021    GABAPENTIN (NEURONTIN) 400 MG CAP    Take 1,200 mg by mouth 3 times a day. 3 capsules=1200mg    GALCANEZUMAB-GNLM (EMGALITY) 120 MG/ML SOLUTION AUTO-INJECTOR    Inject 120 mg under the skin every 30 (thirty) days. On the 27th of every month, last dose was taken on 2/27/2024    IPRATROPIUM-ALBUTEROL (DUONEB) 0.5-2.5 (3) MG/3ML NEBULIZER SOLUTION    Take 3 mL by nebulization.    IVABRADINE (CORLANOR) 7.5 MG TAB TABLET    Take 1 Tablet by mouth 2 times a day with meals.    LAMOTRIGINE (LAMICTAL) 25 MG TAB    Take 50 mg by mouth 2 times a day. 50 mg = 2 tablets    LOPERAMIDE (IMODIUM) 2 MG CAP    Take 1 Capsule by mouth 4 times a day as needed for Diarrhea.    MELATONIN 10 MG TAB    Take 10 mg  "by mouth at bedtime.    METHYLPREDNISOLONE (MEDROL DOSEPAK) 4 MG TABLET THERAPY PACK    Follow schedule on package instructions.    METOPROLOL SR (TOPROL XL) 25 MG TABLET SR 24 HR    Take 1 Tablet by mouth every day.    OMEPRAZOLE (PRILOSEC) 20 MG DELAYED-RELEASE CAPSULE    Take 20 mg by mouth 2 times a day.    ONDANSETRON (ZOFRAN ODT) 4 MG TABLET DISPERSIBLE    Take 1 Tablet by mouth every 6 hours as needed for Nausea/Vomiting.    RIMEGEPANT SULFATE (NURTEC) 75 MG TABLET DISPERSIBLE    Take 75 mg by mouth 1 time a day as needed (migraine).    SODIUM CHLORIDE (SALT) 1 GM TAB    TAKE 1 TABLET BY MOUTH THREE TIMES A DAY WITH MEALS ( NOT COVERED/INS )    SUMATRIPTAN (IMITREX) 20 MG/ACT NASAL SPRAY    Administer 1 Spray into affected nostril(S) as needed.    TIZANIDINE (ZANAFLEX) 4 MG TAB    Take 4 mg by mouth 2 times a day.    TOPIRAMATE (TOPAMAX) 50 MG TABLET    Take 50 mg by mouth 2 times a day.    ZIPRASIDONE (GEODON) 40 MG CAP    Take 1 capsule by mouth at bedtime.       ALLERGIES  Cefdinir, Depakote [divalproex sodium], Depakote [divalproex sodium], Doxycycline, Montelukast, Montelukast [singulair], Vancomycin, Wound dressing adhesive, Amitriptyline, Amoxicillin, Aripiprazole, Aripiprazole [abilify], Clindamycin, Erythromycin, Flomax [tamsulosin hydrochloride], Hydromorphone, Levaquin, Metformin, Sulfa drugs, Tamsulosin, Tape, Sulfamethoxazole w-trimethoprim, Ampicillin, Ciprofloxacin, Keflex, Levofloxacin, Metronidazole, Penicillins, and Valproic acid    PHYSICAL EXAM  BP (!) 155/83   Pulse 97   Temp 36.4 °C (97.5 °F) (Temporal)   Resp (!) 29   Ht 1.626 m (5' 4\")   Wt 120 kg (264 lb)   SpO2 97%   BMI 45.32 kg/m²     General: No acute distress. BMI >30.   HENT: Normocephalic, Mucus membranes are moist.   Chest: Lungs have even and unlabored respirations, Clear to auscultation.   Cardiovascular: Regular rate and regular rhythm, No peripheral cyanosis.  Respiratory: Mild to moderate respiratory distress and " tachypnea. Decreased lung sounds throughout.   Abdomen: Non distended. Large. Stapes intact. Right abdomen has dressing and GP drain with a history of serosanguinous   Neuro: Awake, Conversive, Able to relay recent events.  Psychiatric: Calm and cooperative.       INITIAL ASSESSMENT  The patient's right abdomen has dressing and GP drain with a history of serosanguinous. Concerns for pneumonia, bronchitis, sepsis, electrolyte imbalance. Will evaluate with labs and initial albuterol treatment.     ED Observation Status? No; Patient does not meet criteria for ED Observation.     DIAGNOSTIC STUDIES    Labs:   Results for orders placed or performed during the hospital encounter of 06/23/24   Lactic Acid   Result Value Ref Range    Lactic Acid 2.9 (H) 0.5 - 2.0 mmol/L   Lactic Acid   Result Value Ref Range    Lactic Acid 4.6 (HH) 0.5 - 2.0 mmol/L   Lactic Acid   Result Value Ref Range    Lactic Acid 5.8 (HH) 0.5 - 2.0 mmol/L   CBC with Differential   Result Value Ref Range    WBC 4.4 (L) 4.8 - 10.8 K/uL    RBC 4.32 4.20 - 5.40 M/uL    Hemoglobin 13.8 12.0 - 16.0 g/dL    Hematocrit 39.7 37.0 - 47.0 %    MCV 91.9 81.4 - 97.8 fL    MCH 31.9 27.0 - 33.0 pg    MCHC 34.8 32.2 - 35.5 g/dL    RDW 41.4 35.9 - 50.0 fL    Platelet Count 190 164 - 446 K/uL    MPV 11.0 9.0 - 12.9 fL    Neutrophils-Polys 53.50 44.00 - 72.00 %    Lymphocytes 29.70 22.00 - 41.00 %    Monocytes 10.00 0.00 - 13.40 %    Eosinophils 4.30 0.00 - 6.90 %    Basophils 0.70 0.00 - 1.80 %    Immature Granulocytes 1.80 (H) 0.00 - 0.90 %    Nucleated RBC 0.00 0.00 - 0.20 /100 WBC    Neutrophils (Absolute) 2.36 1.82 - 7.42 K/uL    Lymphs (Absolute) 1.31 1.00 - 4.80 K/uL    Monos (Absolute) 0.44 0.00 - 0.85 K/uL    Eos (Absolute) 0.19 0.00 - 0.51 K/uL    Baso (Absolute) 0.03 0.00 - 0.12 K/uL    Immature Granulocytes (abs) 0.08 0.00 - 0.11 K/uL    NRBC (Absolute) 0.00 K/uL   Complete Metabolic Panel   Result Value Ref Range    Sodium 139 135 - 145 mmol/L    Potassium  3.5 (L) 3.6 - 5.5 mmol/L    Chloride 106 96 - 112 mmol/L    Co2 16 (L) 20 - 33 mmol/L    Anion Gap 17.0 (H) 7.0 - 16.0    Glucose 178 (H) 65 - 99 mg/dL    Bun 10 8 - 22 mg/dL    Creatinine 0.73 0.50 - 1.40 mg/dL    Calcium 9.1 8.5 - 10.5 mg/dL    Correct Calcium 8.9 8.5 - 10.5 mg/dL    AST(SGOT) 21 12 - 45 U/L    ALT(SGPT) 31 2 - 50 U/L    Alkaline Phosphatase 79 30 - 99 U/L    Total Bilirubin 0.3 0.1 - 1.5 mg/dL    Albumin 4.2 3.2 - 4.9 g/dL    Total Protein 6.6 6.0 - 8.2 g/dL    Globulin 2.4 1.9 - 3.5 g/dL    A-G Ratio 1.8 g/dL   Urinalysis    Specimen: Urine   Result Value Ref Range    Color Yellow     Character Clear     Specific Gravity 1.006 <1.035    Ph 6.5 5.0 - 8.0    Glucose Negative Negative mg/dL    Ketones Negative Negative mg/dL    Protein Negative Negative mg/dL    Bilirubin Negative Negative    Urobilinogen, Urine 0.2 Negative    Nitrite Negative Negative    Leukocyte Esterase Trace (A) Negative    Occult Blood Negative Negative    Micro Urine Req Microscopic    ESTIMATED GFR   Result Value Ref Range    GFR (CKD-EPI) 110 >60 mL/min/1.73 m 2   URINE MICROSCOPIC (W/UA)   Result Value Ref Range    WBC 0-2 /hpf    RBC 0-2 /hpf    Bacteria Negative None /hpf    Epithelial Cells Negative /hpf    Hyaline Cast 0-2 /lpf   BETA-HCG QUALITATIVE SERUM   Result Value Ref Range    Beta-Hcg Qualitative Serum Negative Negative   EKG   Result Value Ref Range    Report       [facility]    Test Date:  2024  Pt Name:    HARLEY DE LA CRUZ              Department: ER  MRN:        [id number]                      Room:  Gender:     Female                       Technician: [id number]  :        [id number]                   Requested By:ER TRIAGE PROTOCOL  Order #:    [id number]                    Reading MD:    Measurements  Intervals                                Axis  Rate:       98                           P:          34  IA:         124                          QRS:        -4  QRSD:        96                           T:          5  QT:         393  QTc:        502    Interpretive Statements  Sinus rhythm  Probable left ventricular hypertrophy  Borderline T abnormalities, anterior leads  Prolonged QT interval  Compared to ECG 04/01/2024 14:36:10  Prolonged QT interval now present  T-wave abnormality still present     POC CoV-2, FLU A/B, RSV by PCR   Result Value Ref Range    POC Influenza A RNA, PCR Negative Negative    POC Influenza B RNA, PCR Negative Negative    POC RSV, by PCR Negative Negative    POC SARS-CoV-2, PCR NotDetected      *Note: Due to a large number of results and/or encounters for the requested time period, some results have not been displayed. A complete set of results can be found in Results Review.        EKG:   I have independently interpreted the above EKG.    Radiology:   The attending emergency physician has independently interpreted the diagnostic imaging associated with this visit and am waiting the final reading from the radiologist.   Preliminary interpretation is as follows: No abscess  Radiologist interpretation:   CT-ABDOMEN-PELVIS WITH   Final Result      1.  Minimal fluid lateral to the pylorus/proximal duodenum, likely related to the recent pyloric valve surgery. No drainable fluid collections. No extraluminal air.   2.  Nonobstructing right nephrolithiasis.   3.  No acute inflammatory process in the abdomen or pelvis.      CT-CTA CHEST PULMONARY ARTERY W/ RECONS   Final Result      1.  No CT evidence of pulmonary embolism.      2.  Limited opacifications are noted in each lung which could BE due to consolidative atelectasis although developing pneumonitis or pneumonia is also possible.            DX-CHEST-PORTABLE (1 VIEW)   Final Result         No acute cardiac or pulmonary abnormality is identified.         CRITICAL CARE  The very real possibilty of a deterioration of this patient's condition required the highest level of my preparedness for sudden, emergent  intervention.  I provided critical care services, which included medication orders, frequent reevaluations of the patient's condition and response to treatment, ordering and reviewing test results, and discussing the case with various consultants.  The critical care time associated with the care of the patient was 35 minutes. Review chart for interventions. This time is exclusive of any other billable procedures.      COURSE & MEDICAL DECISION MAKING     COURSE AND PLAN  11:04 AM - Patient seen and examined at bedside. Discussed plan of care, including ordering labs and imaging to further evaluate. Patient agrees to the plan of care. The patient will be medicated with albuterol 5 mg/mL. Ordered for DX-chest, eGFR, Urine microscopic, eGFR, Lactic acid, CBC with diff, CMP, UA, Urine culture, Blood culture, EKG to evaluate her symptoms.     12:56 PM - Ordered CT-abdomen-pelvis with and CT-CTA chest pulmonary artery w/ recons to further evaluate for source of infectio.     1:51 PM - Ordered Maxipime 2 g in  mL IVPB for the patient's symptoms. Ordered beta-HCG qual to further evaluate for abdominal pain.     2:51 PM - Patient was reevaluated at bedside. The patient reports having pain. Ordered morphine 6 mg for her symptoms.     3:40 PM - The patient's initial lactic acid was 2.9 and IV fluids and antibiotics were given. Her repeat lactic acid was 4.6. This is concerning for sepsis. CT-chest shows atelectasis vs small infiltrate. CT-abdomen shows mild fluid abscess. She is a fresh post op patient from Dr. Preston. Will talk with transfer about transferring her to her primary surgeon. Ordered SARS-CoV-2, Flu A/B, and RSV to further evaluate the patient's symptoms.     4:39 PM - Patient was reevaluated at bedside. Her lactic acid went up more. Her pain improved after administration of pain medications. Her subjective shortness of breath also improved. Her surgeon is at [facility] in [place]. I spoke with Dr. Salmon,  and she has accepted the patient for transfer.     ED Summary: This patient is 5 days postop from a pyloroplasty, she presents with shortness of breath she has a history of asthma, she has decreased lung sounds and is tachypneic.  Mildly tachycardic.  Nebulized treatment for 1 hour was given and this did improve her shortness of breath lab test evaluation was completed chest x-ray showed no pneumonia but lactic acid level is 2.9.  She her white blood cell count is normal and she is not febrile.  IV antibiotics and IV fluids were initiated.    CTA of the chest shows atelectasis versus pneumonia, no significant pneumonia or source of infection, abdominal CT does not show an abscess there is mild fluid but this may be related to the surgery itself.    Her abdomen is diffusely mildly tender but she is only 5 days postoperative.    Repeat lactic acids after fluid and antibiotic show continually increasing lactic acidosis.  She is not having any further shortness of breath.  Her pain is improved with some morphine.    I do not know where the source of infection is this may be related to the abdominal surgery for this reason it is best for the patient to be transferred to where her general surgeon practices.  I did speak through the transfer center to the accepting physician at [facility].  I spoke with the patient the patient will be transferred to [facility] for further treatment she is hemodynamically stable at this time and stable for transfer.    Decision tools and prescription drugs considered including, but not limited to:     HYDRATION: Based on the patient's presentation of Dehydration the patient was given IV fluids. IV Hydration was used because oral hydration was not adequate alone. Upon recheck following hydration, the patient was improved.      DISPOSITION AND DISCUSSIONS  I have discussed management of the patient with the following physicians and ARIES's: Dr. Salmon (accepting  physician)    Discussion of management with other [facility] or appropriate source(s): RT    Patient will be transferred to Dr. Salmon () in guarded condition.      FINAL DIAGNOSIS  1. Lactic acidosis    2. Shortness of breath    3. Generalized abdominal pain    4.      CCT: 35 minutes     Soo RODRIGUEZ (Scribe), am scribing for, and in the presence of, Vikram Dunlap D.O..    Electronically signed by: Soo Wilson (Carolibsadie), 6/23/2024    IVikram D.O. personally performed the services described in this documentation, as scribed by Soo Wilson in my presence, and it is both accurate and complete.     The note accurately reflects work and decisions made by me.  Vikram Dunlap D.O.  6/23/2024  5:14 PM

## 2024-06-23 NOTE — ED NOTES
The pt is alert and oriented. The pt reports abd pain. Tachycardic and tachypnic. Other vitals stable. Blood drawn

## 2024-06-23 NOTE — ED NOTES
Bedside report received from off going RN/tech: Autumn, assumed care of patient.  POC discussed with patient. Call light within reach, all needs addressed at this time.       Fall risk interventions in place: Not Applicable (all applicable per [facility] Fall risk assessment)   Continuous monitoring: Cardiac Leads, Pulse Ox, or Blood Pressure  IVF/IV medications: Not Applicable   Oxygen: How many liters 6L, Traced the line to wall oxygen, and No oxygen tank in room  Bedside sitter: Not Applicable   Isolation: Not Applicable

## 2024-06-23 NOTE — ED NOTES
Pt wheeled to restroom on 6L NC (breathing tx). HR upon return in the 130s, pt visibly SOB, RR 30s.

## 2024-06-24 LAB
BACTERIA UR CULT: NORMAL
SIGNIFICANT IND 70042: NORMAL
SITE SITE: NORMAL
SOURCE SOURCE: NORMAL

## 2024-06-24 NOTE — ED NOTES
The pt is alert and oriented. The pt ambulatory to the restroom with a steady gait. The pt tachycardic and tachypnic, other vitals stable.

## 2024-06-24 NOTE — ED NOTES
The pt is alert and oriented. The pt reports returning pain. HR decreasing, pt still tachypnic, other vitals stable

## 2024-06-24 NOTE — DISCHARGE PLANNING
ER  Note:  Received the following information from RT[facility]:  CM Details for Outgoing Transfer  Patient Name: Kristin Balderrama  MRN: [id number]  Receiving Facility:  [facility]  Receiving Facility [facility] [place] [address]  Accepting Physician: Dayna Sommers MD  Bed Assignment: [id number]  RN to RN Report Phone #: [phone]  Transport Date: 6/23  Transport Time: TBD  Transport Vendor: [employer] - please fax PCS to [phone]  [facility] notified Film Room to push imaging and to create discs as needed for CM to .  Mode of Transport Requested: GROUND      [facility] PCS faxed to RT[facility]. RN CM called film room. Per film room, unable to burn disc but was able to push imaging to [facility], RTOC informed. ER RN and ERP updated. RN CM spoke to pt at bedside. Received consent for transfer to [facility] and transportation. Per pt, her family is aware. Cobra, transfer packet given to ER RN. Per ER RN, report has been called. Per [facility], [facility] ETA 1800. ER RN updated.

## 2024-06-24 NOTE — ED NOTES
Break RN:    Pt being transferred to [facility] in stable condition via [facility]. All belongings being sent with pt and transfer packet.

## 2024-07-03 ENCOUNTER — OFFICE VISIT (OUTPATIENT)
Dept: URGENT CARE | Facility: CLINIC | Age: 34
End: 2024-07-03
Payer: MEDICARE

## 2024-07-03 ENCOUNTER — APPOINTMENT (OUTPATIENT)
Dept: RADIOLOGY | Facility: IMAGING CENTER | Age: 34
End: 2024-07-03
Attending: FAMILY MEDICINE
Payer: MEDICARE

## 2024-07-03 VITALS
DIASTOLIC BLOOD PRESSURE: 72 MMHG | BODY MASS INDEX: 44.05 KG/M2 | HEART RATE: 82 BPM | RESPIRATION RATE: 16 BRPM | HEIGHT: 64 IN | TEMPERATURE: 97.5 F | SYSTOLIC BLOOD PRESSURE: 108 MMHG | WEIGHT: 258 LBS | OXYGEN SATURATION: 97 %

## 2024-07-03 DIAGNOSIS — B34.9 VIRAL ILLNESS: ICD-10-CM

## 2024-07-03 DIAGNOSIS — R06.02 SOB (SHORTNESS OF BREATH): ICD-10-CM

## 2024-07-03 DIAGNOSIS — R39.9 UTI SYMPTOMS: ICD-10-CM

## 2024-07-03 LAB
APPEARANCE UR: NORMAL
BILIRUB UR STRIP-MCNC: NEGATIVE MG/DL
COLOR UR AUTO: NORMAL
GLUCOSE UR STRIP.AUTO-MCNC: NEGATIVE MG/DL
KETONES UR STRIP.AUTO-MCNC: NEGATIVE MG/DL
LEUKOCYTE ESTERASE UR QL STRIP.AUTO: NEGATIVE
NITRITE UR QL STRIP.AUTO: NEGATIVE
PH UR STRIP.AUTO: 5 [PH] (ref 5–8)
POCT INT CON NEG: NEGATIVE
POCT INT CON POS: POSITIVE
POCT URINE PREGNANCY TEST: NEGATIVE
PROT UR QL STRIP: NEGATIVE MG/DL
RBC UR QL AUTO: NEGATIVE
SP GR UR STRIP.AUTO: 1.03
UROBILINOGEN UR STRIP-MCNC: 0.2 MG/DL

## 2024-07-03 PROCEDURE — 99213 OFFICE O/P EST LOW 20 MIN: CPT | Performed by: FAMILY MEDICINE

## 2024-07-03 PROCEDURE — 3078F DIAST BP <80 MM HG: CPT | Performed by: FAMILY MEDICINE

## 2024-07-03 PROCEDURE — 81002 URINALYSIS NONAUTO W/O SCOPE: CPT | Performed by: FAMILY MEDICINE

## 2024-07-03 PROCEDURE — 3074F SYST BP LT 130 MM HG: CPT | Performed by: FAMILY MEDICINE

## 2024-07-03 PROCEDURE — 81025 URINE PREGNANCY TEST: CPT | Performed by: FAMILY MEDICINE

## 2024-07-03 PROCEDURE — 71046 X-RAY EXAM CHEST 2 VIEWS: CPT | Mod: TC | Performed by: PHYSICIAN ASSISTANT

## 2024-07-03 ASSESSMENT — FIBROSIS 4 INDEX: FIB4 SCORE: 0.67

## 2024-07-08 ENCOUNTER — HOSPITAL ENCOUNTER (EMERGENCY)
Facility: MEDICAL CENTER | Age: 34
End: 2024-07-08
Attending: EMERGENCY MEDICINE
Payer: MEDICARE

## 2024-07-08 ENCOUNTER — APPOINTMENT (OUTPATIENT)
Dept: RADIOLOGY | Facility: MEDICAL CENTER | Age: 34
End: 2024-07-08
Attending: EMERGENCY MEDICINE
Payer: MEDICARE

## 2024-07-08 VITALS
TEMPERATURE: 98.7 F | HEIGHT: 64 IN | BODY MASS INDEX: 44.39 KG/M2 | SYSTOLIC BLOOD PRESSURE: 155 MMHG | DIASTOLIC BLOOD PRESSURE: 88 MMHG | OXYGEN SATURATION: 99 % | WEIGHT: 260 LBS | RESPIRATION RATE: 18 BRPM | HEART RATE: 83 BPM

## 2024-07-08 DIAGNOSIS — R06.02 SOB (SHORTNESS OF BREATH): ICD-10-CM

## 2024-07-08 LAB
ALBUMIN SERPL BCP-MCNC: 4.6 G/DL (ref 3.2–4.9)
ALBUMIN/GLOB SERPL: 2.7 G/DL
ALP SERPL-CCNC: 77 U/L (ref 30–99)
ALT SERPL-CCNC: 46 U/L (ref 2–50)
ANION GAP SERPL CALC-SCNC: 20 MMOL/L (ref 7–16)
AST SERPL-CCNC: 22 U/L (ref 12–45)
BASOPHILS # BLD AUTO: 0.9 % (ref 0–1.8)
BASOPHILS # BLD: 0.05 K/UL (ref 0–0.12)
BILIRUB SERPL-MCNC: 0.3 MG/DL (ref 0.1–1.5)
BUN SERPL-MCNC: 12 MG/DL (ref 8–22)
CALCIUM ALBUM COR SERPL-MCNC: 9.4 MG/DL (ref 8.5–10.5)
CALCIUM SERPL-MCNC: 9.9 MG/DL (ref 8.5–10.5)
CHLORIDE SERPL-SCNC: 110 MMOL/L (ref 96–112)
CO2 SERPL-SCNC: 11 MMOL/L (ref 20–33)
CREAT SERPL-MCNC: 0.75 MG/DL (ref 0.5–1.4)
EOSINOPHIL # BLD AUTO: 0.15 K/UL (ref 0–0.51)
EOSINOPHIL NFR BLD: 2.7 % (ref 0–6.9)
ERYTHROCYTE [DISTWIDTH] IN BLOOD BY AUTOMATED COUNT: 42.3 FL (ref 35.9–50)
FLUAV RNA SPEC QL NAA+PROBE: NEGATIVE
FLUBV RNA SPEC QL NAA+PROBE: NEGATIVE
GFR SERPLBLD CREATININE-BSD FMLA CKD-EPI: 107 ML/MIN/1.73 M 2
GLOBULIN SER CALC-MCNC: 1.7 G/DL (ref 1.9–3.5)
GLUCOSE SERPL-MCNC: 102 MG/DL (ref 65–99)
HCT VFR BLD AUTO: 39.5 % (ref 37–47)
HGB BLD-MCNC: 13.9 G/DL (ref 12–16)
IMM GRANULOCYTES # BLD AUTO: 0.02 K/UL (ref 0–0.11)
IMM GRANULOCYTES NFR BLD AUTO: 0.4 % (ref 0–0.9)
LACTATE SERPL-SCNC: 2.3 MMOL/L (ref 0.5–2)
LYMPHOCYTES # BLD AUTO: 1.51 K/UL (ref 1–4.8)
LYMPHOCYTES NFR BLD: 27.4 % (ref 22–41)
MCH RBC QN AUTO: 32.2 PG (ref 27–33)
MCHC RBC AUTO-ENTMCNC: 35.2 G/DL (ref 32.2–35.5)
MCV RBC AUTO: 91.4 FL (ref 81.4–97.8)
MONOCYTES # BLD AUTO: 0.5 K/UL (ref 0–0.85)
MONOCYTES NFR BLD AUTO: 9.1 % (ref 0–13.4)
NEUTROPHILS # BLD AUTO: 3.28 K/UL (ref 1.82–7.42)
NEUTROPHILS NFR BLD: 59.5 % (ref 44–72)
NRBC # BLD AUTO: 0 K/UL
NRBC BLD-RTO: 0 /100 WBC (ref 0–0.2)
PLATELET # BLD AUTO: 217 K/UL (ref 164–446)
PMV BLD AUTO: 11.6 FL (ref 9–12.9)
POTASSIUM SERPL-SCNC: 3.7 MMOL/L (ref 3.6–5.5)
PROCALCITONIN SERPL-MCNC: 0.05 NG/ML
PROT SERPL-MCNC: 6.3 G/DL (ref 6–8.2)
RBC # BLD AUTO: 4.32 M/UL (ref 4.2–5.4)
RSV RNA SPEC QL NAA+PROBE: NEGATIVE
SARS-COV-2 RNA RESP QL NAA+PROBE: NOTDETECTED
SODIUM SERPL-SCNC: 141 MMOL/L (ref 135–145)
WBC # BLD AUTO: 5.5 K/UL (ref 4.8–10.8)

## 2024-07-08 PROCEDURE — 0241U HCHG SARS-COV-2 COVID-19 NFCT DS RESP RNA 4 TRGT ED POC: CPT

## 2024-07-08 PROCEDURE — 36415 COLL VENOUS BLD VENIPUNCTURE: CPT

## 2024-07-08 PROCEDURE — 84145 PROCALCITONIN (PCT): CPT

## 2024-07-08 PROCEDURE — 85025 COMPLETE CBC W/AUTO DIFF WBC: CPT

## 2024-07-08 PROCEDURE — 71045 X-RAY EXAM CHEST 1 VIEW: CPT

## 2024-07-08 PROCEDURE — 83605 ASSAY OF LACTIC ACID: CPT

## 2024-07-08 PROCEDURE — 80053 COMPREHEN METABOLIC PANEL: CPT

## 2024-07-08 PROCEDURE — 99284 EMERGENCY DEPT VISIT MOD MDM: CPT | Mod: 25

## 2024-07-08 ASSESSMENT — FIBROSIS 4 INDEX: FIB4 SCORE: 0.67

## 2024-07-12 ENCOUNTER — TELEPHONE (OUTPATIENT)
Dept: HEALTH INFORMATION MANAGEMENT | Facility: OTHER | Age: 34
End: 2024-07-12
Payer: MEDICARE

## 2024-07-13 ENCOUNTER — APPOINTMENT (OUTPATIENT)
Dept: RADIOLOGY | Facility: MEDICAL CENTER | Age: 34
End: 2024-07-13
Attending: EMERGENCY MEDICINE
Payer: MEDICARE

## 2024-07-13 ENCOUNTER — HOSPITAL ENCOUNTER (EMERGENCY)
Facility: MEDICAL CENTER | Age: 34
End: 2024-07-13
Attending: EMERGENCY MEDICINE
Payer: MEDICARE

## 2024-07-13 ENCOUNTER — OFFICE VISIT (OUTPATIENT)
Dept: URGENT CARE | Facility: CLINIC | Age: 34
End: 2024-07-13
Payer: MEDICARE

## 2024-07-13 VITALS
HEART RATE: 77 BPM | DIASTOLIC BLOOD PRESSURE: 88 MMHG | RESPIRATION RATE: 12 BRPM | BODY MASS INDEX: 44.39 KG/M2 | OXYGEN SATURATION: 99 % | TEMPERATURE: 97.6 F | HEIGHT: 64 IN | WEIGHT: 260 LBS | SYSTOLIC BLOOD PRESSURE: 138 MMHG

## 2024-07-13 VITALS
BODY MASS INDEX: 44.75 KG/M2 | RESPIRATION RATE: 18 BRPM | SYSTOLIC BLOOD PRESSURE: 150 MMHG | HEIGHT: 64 IN | DIASTOLIC BLOOD PRESSURE: 75 MMHG | TEMPERATURE: 98.1 F | WEIGHT: 262.13 LBS | HEART RATE: 70 BPM | OXYGEN SATURATION: 97 %

## 2024-07-13 DIAGNOSIS — R60.0 PERIPHERAL EDEMA: ICD-10-CM

## 2024-07-13 DIAGNOSIS — R11.0 NAUSEA: ICD-10-CM

## 2024-07-13 DIAGNOSIS — R42 DIZZINESS: ICD-10-CM

## 2024-07-13 DIAGNOSIS — M79.672 FOOT PAIN, BILATERAL: ICD-10-CM

## 2024-07-13 DIAGNOSIS — M79.671 FOOT PAIN, BILATERAL: ICD-10-CM

## 2024-07-13 LAB
ALBUMIN SERPL BCP-MCNC: 4.7 G/DL (ref 3.2–4.9)
ALBUMIN/GLOB SERPL: 2.6 G/DL
ALP SERPL-CCNC: 81 U/L (ref 30–99)
ALT SERPL-CCNC: 38 U/L (ref 2–50)
ANION GAP SERPL CALC-SCNC: 18 MMOL/L (ref 7–16)
AST SERPL-CCNC: 22 U/L (ref 12–45)
BASOPHILS # BLD AUTO: 0.9 % (ref 0–1.8)
BASOPHILS # BLD: 0.05 K/UL (ref 0–0.12)
BILIRUB SERPL-MCNC: 0.5 MG/DL (ref 0.1–1.5)
BUN SERPL-MCNC: 11 MG/DL (ref 8–22)
CALCIUM ALBUM COR SERPL-MCNC: 9.2 MG/DL (ref 8.5–10.5)
CALCIUM SERPL-MCNC: 9.8 MG/DL (ref 8.5–10.5)
CHLORIDE SERPL-SCNC: 110 MMOL/L (ref 96–112)
CO2 SERPL-SCNC: 13 MMOL/L (ref 20–33)
CREAT SERPL-MCNC: 0.7 MG/DL (ref 0.5–1.4)
EKG IMPRESSION: NORMAL
EOSINOPHIL # BLD AUTO: 0.21 K/UL (ref 0–0.51)
EOSINOPHIL NFR BLD: 3.9 % (ref 0–6.9)
ERYTHROCYTE [DISTWIDTH] IN BLOOD BY AUTOMATED COUNT: 42 FL (ref 35.9–50)
GFR SERPLBLD CREATININE-BSD FMLA CKD-EPI: 116 ML/MIN/1.73 M 2
GLOBULIN SER CALC-MCNC: 1.8 G/DL (ref 1.9–3.5)
GLUCOSE SERPL-MCNC: 114 MG/DL (ref 65–99)
HCG SERPL QL: NEGATIVE
HCT VFR BLD AUTO: 42.1 % (ref 37–47)
HGB BLD-MCNC: 14.6 G/DL (ref 12–16)
IMM GRANULOCYTES # BLD AUTO: 0.01 K/UL (ref 0–0.11)
IMM GRANULOCYTES NFR BLD AUTO: 0.2 % (ref 0–0.9)
LYMPHOCYTES # BLD AUTO: 1.45 K/UL (ref 1–4.8)
LYMPHOCYTES NFR BLD: 27.1 % (ref 22–41)
MCH RBC QN AUTO: 31.6 PG (ref 27–33)
MCHC RBC AUTO-ENTMCNC: 34.7 G/DL (ref 32.2–35.5)
MCV RBC AUTO: 91.1 FL (ref 81.4–97.8)
MONOCYTES # BLD AUTO: 0.5 K/UL (ref 0–0.85)
MONOCYTES NFR BLD AUTO: 9.3 % (ref 0–13.4)
NEUTROPHILS # BLD AUTO: 3.13 K/UL (ref 1.82–7.42)
NEUTROPHILS NFR BLD: 58.6 % (ref 44–72)
NRBC # BLD AUTO: 0 K/UL
NRBC BLD-RTO: 0 /100 WBC (ref 0–0.2)
NT-PROBNP SERPL IA-MCNC: 87 PG/ML (ref 0–125)
PLATELET # BLD AUTO: 187 K/UL (ref 164–446)
PMV BLD AUTO: 11.5 FL (ref 9–12.9)
POTASSIUM SERPL-SCNC: 3.9 MMOL/L (ref 3.6–5.5)
PROT SERPL-MCNC: 6.5 G/DL (ref 6–8.2)
RBC # BLD AUTO: 4.62 M/UL (ref 4.2–5.4)
SODIUM SERPL-SCNC: 141 MMOL/L (ref 135–145)
TROPONIN T SERPL-MCNC: <6 NG/L (ref 6–19)
WBC # BLD AUTO: 5.4 K/UL (ref 4.8–10.8)

## 2024-07-13 PROCEDURE — 80053 COMPREHEN METABOLIC PANEL: CPT

## 2024-07-13 PROCEDURE — 99284 EMERGENCY DEPT VISIT MOD MDM: CPT

## 2024-07-13 PROCEDURE — 71045 X-RAY EXAM CHEST 1 VIEW: CPT

## 2024-07-13 PROCEDURE — 93970 EXTREMITY STUDY: CPT

## 2024-07-13 PROCEDURE — 84703 CHORIONIC GONADOTROPIN ASSAY: CPT

## 2024-07-13 PROCEDURE — 36415 COLL VENOUS BLD VENIPUNCTURE: CPT

## 2024-07-13 PROCEDURE — A9270 NON-COVERED ITEM OR SERVICE: HCPCS | Mod: UD | Performed by: EMERGENCY MEDICINE

## 2024-07-13 PROCEDURE — 3075F SYST BP GE 130 - 139MM HG: CPT

## 2024-07-13 PROCEDURE — 93005 ELECTROCARDIOGRAM TRACING: CPT | Performed by: EMERGENCY MEDICINE

## 2024-07-13 PROCEDURE — 93005 ELECTROCARDIOGRAM TRACING: CPT

## 2024-07-13 PROCEDURE — 99213 OFFICE O/P EST LOW 20 MIN: CPT

## 2024-07-13 PROCEDURE — 85025 COMPLETE CBC W/AUTO DIFF WBC: CPT

## 2024-07-13 PROCEDURE — 700102 HCHG RX REV CODE 250 W/ 637 OVERRIDE(OP): Mod: UD | Performed by: EMERGENCY MEDICINE

## 2024-07-13 PROCEDURE — 84484 ASSAY OF TROPONIN QUANT: CPT

## 2024-07-13 PROCEDURE — 3079F DIAST BP 80-89 MM HG: CPT

## 2024-07-13 PROCEDURE — 700111 HCHG RX REV CODE 636 W/ 250 OVERRIDE (IP): Mod: UD | Performed by: EMERGENCY MEDICINE

## 2024-07-13 PROCEDURE — 83880 ASSAY OF NATRIURETIC PEPTIDE: CPT

## 2024-07-13 RX ORDER — IBUPROFEN 800 MG/1
800 TABLET ORAL EVERY 8 HOURS PRN
Qty: 30 TABLET | Refills: 0 | Status: SHIPPED | OUTPATIENT
Start: 2024-07-13

## 2024-07-13 RX ORDER — TRAMADOL HYDROCHLORIDE 50 MG/1
50 TABLET ORAL EVERY 6 HOURS PRN
Qty: 8 TABLET | Refills: 0 | Status: SHIPPED | OUTPATIENT
Start: 2024-07-13 | End: 2024-07-16

## 2024-07-13 RX ORDER — OXYCODONE HYDROCHLORIDE AND ACETAMINOPHEN 5; 325 MG/1; MG/1
2 TABLET ORAL ONCE
Status: COMPLETED | OUTPATIENT
Start: 2024-07-13 | End: 2024-07-13

## 2024-07-13 RX ORDER — GABAPENTIN 100 MG/1
100 CAPSULE ORAL 3 TIMES DAILY
Qty: 30 CAPSULE | Refills: 1 | Status: SHIPPED | OUTPATIENT
Start: 2024-07-13 | End: 2024-07-23

## 2024-07-13 RX ORDER — ONDANSETRON 4 MG/1
4 TABLET, ORALLY DISINTEGRATING ORAL ONCE
Status: COMPLETED | OUTPATIENT
Start: 2024-07-13 | End: 2024-07-13

## 2024-07-13 RX ORDER — SPIRONOLACTONE 25 MG/1
TABLET ORAL
COMMUNITY
Start: 2024-07-10

## 2024-07-13 RX ADMIN — OXYCODONE AND ACETAMINOPHEN 2 TABLET: 5; 325 TABLET ORAL at 15:25

## 2024-07-13 RX ADMIN — ONDANSETRON 4 MG: 4 TABLET, ORALLY DISINTEGRATING ORAL at 15:25

## 2024-07-13 ASSESSMENT — ENCOUNTER SYMPTOMS
DOUBLE VISION: 0
ABDOMINAL PAIN: 1
HEADACHES: 0
VOMITING: 0
SHORTNESS OF BREATH: 1
PHOTOPHOBIA: 0
FEVER: 0
MYALGIAS: 0
COUGH: 0
BLURRED VISION: 0
STRIDOR: 0
SPEECH CHANGE: 0
DIZZINESS: 1
SPUTUM PRODUCTION: 0
FALLS: 0
CHILLS: 0
NAUSEA: 0
DIARRHEA: 1
WHEEZING: 0
PALPITATIONS: 0
NECK PAIN: 0
BACK PAIN: 0
SENSORY CHANGE: 0
TINGLING: 0
SORE THROAT: 0
FOCAL WEAKNESS: 0
EYE PAIN: 0
WEAKNESS: 0

## 2024-07-13 ASSESSMENT — FIBROSIS 4 INDEX
FIB4 SCORE: 0.51
FIB4 SCORE: 0.51

## 2024-07-13 ASSESSMENT — VISUAL ACUITY: OU: 1

## 2024-07-15 ENCOUNTER — OFFICE VISIT (OUTPATIENT)
Dept: URGENT CARE | Facility: CLINIC | Age: 34
End: 2024-07-15
Payer: MEDICARE

## 2024-07-15 VITALS
HEART RATE: 86 BPM | HEIGHT: 64 IN | WEIGHT: 260.5 LBS | DIASTOLIC BLOOD PRESSURE: 62 MMHG | RESPIRATION RATE: 12 BRPM | SYSTOLIC BLOOD PRESSURE: 118 MMHG | OXYGEN SATURATION: 98 % | BODY MASS INDEX: 44.47 KG/M2 | TEMPERATURE: 97 F

## 2024-07-15 DIAGNOSIS — L30.4 INTERTRIGO: ICD-10-CM

## 2024-07-15 PROCEDURE — 3074F SYST BP LT 130 MM HG: CPT | Performed by: PHYSICIAN ASSISTANT

## 2024-07-15 PROCEDURE — 99213 OFFICE O/P EST LOW 20 MIN: CPT | Performed by: PHYSICIAN ASSISTANT

## 2024-07-15 PROCEDURE — 3078F DIAST BP <80 MM HG: CPT | Performed by: PHYSICIAN ASSISTANT

## 2024-07-15 RX ORDER — KETOCONAZOLE 20 MG/G
1 CREAM TOPICAL
Qty: 60 G | Refills: 0 | Status: SHIPPED | OUTPATIENT
Start: 2024-07-15

## 2024-07-15 ASSESSMENT — FIBROSIS 4 INDEX: FIB4 SCORE: 0.65

## 2024-07-17 ENCOUNTER — APPOINTMENT (OUTPATIENT)
Dept: RADIOLOGY | Facility: MEDICAL CENTER | Age: 34
End: 2024-07-17
Attending: EMERGENCY MEDICINE
Payer: MEDICARE

## 2024-07-17 ENCOUNTER — HOSPITAL ENCOUNTER (EMERGENCY)
Facility: MEDICAL CENTER | Age: 34
End: 2024-07-17
Attending: EMERGENCY MEDICINE
Payer: MEDICARE

## 2024-07-17 ENCOUNTER — PATIENT MESSAGE (OUTPATIENT)
Dept: CARDIOLOGY | Facility: MEDICAL CENTER | Age: 34
End: 2024-07-17
Payer: MEDICARE

## 2024-07-17 VITALS
RESPIRATION RATE: 16 BRPM | SYSTOLIC BLOOD PRESSURE: 144 MMHG | BODY MASS INDEX: 44.41 KG/M2 | HEIGHT: 64 IN | DIASTOLIC BLOOD PRESSURE: 76 MMHG | TEMPERATURE: 97.3 F | HEART RATE: 67 BPM | WEIGHT: 260.14 LBS | OXYGEN SATURATION: 97 %

## 2024-07-17 DIAGNOSIS — R60.0 PERIPHERAL EDEMA: ICD-10-CM

## 2024-07-17 LAB
ALBUMIN SERPL BCP-MCNC: 4.6 G/DL (ref 3.2–4.9)
ALBUMIN/GLOB SERPL: 2.6 G/DL
ALP SERPL-CCNC: 81 U/L (ref 30–99)
ALT SERPL-CCNC: 36 U/L (ref 2–50)
ANION GAP SERPL CALC-SCNC: 14 MMOL/L (ref 7–16)
APPEARANCE UR: CLEAR
AST SERPL-CCNC: 22 U/L (ref 12–45)
BACTERIA #/AREA URNS HPF: ABNORMAL /HPF
BASOPHILS # BLD AUTO: 0.9 % (ref 0–1.8)
BASOPHILS # BLD: 0.05 K/UL (ref 0–0.12)
BILIRUB SERPL-MCNC: 0.4 MG/DL (ref 0.1–1.5)
BILIRUB UR QL STRIP.AUTO: NEGATIVE
BUN SERPL-MCNC: 12 MG/DL (ref 8–22)
CALCIUM ALBUM COR SERPL-MCNC: 9.3 MG/DL (ref 8.5–10.5)
CALCIUM SERPL-MCNC: 9.8 MG/DL (ref 8.5–10.5)
CHLORIDE SERPL-SCNC: 110 MMOL/L (ref 96–112)
CO2 SERPL-SCNC: 17 MMOL/L (ref 20–33)
COLOR UR: YELLOW
CREAT SERPL-MCNC: 0.84 MG/DL (ref 0.5–1.4)
EKG IMPRESSION: NORMAL
EOSINOPHIL # BLD AUTO: 0.34 K/UL (ref 0–0.51)
EOSINOPHIL NFR BLD: 5.8 % (ref 0–6.9)
EPI CELLS #/AREA URNS HPF: ABNORMAL /HPF
ERYTHROCYTE [DISTWIDTH] IN BLOOD BY AUTOMATED COUNT: 41.1 FL (ref 35.9–50)
GFR SERPLBLD CREATININE-BSD FMLA CKD-EPI: 93 ML/MIN/1.73 M 2
GLOBULIN SER CALC-MCNC: 1.8 G/DL (ref 1.9–3.5)
GLUCOSE SERPL-MCNC: 110 MG/DL (ref 65–99)
GLUCOSE UR STRIP.AUTO-MCNC: NEGATIVE MG/DL
HCG SERPL QL: NEGATIVE
HCT VFR BLD AUTO: 43.6 % (ref 37–47)
HGB BLD-MCNC: 14.9 G/DL (ref 12–16)
HYALINE CASTS #/AREA URNS LPF: ABNORMAL /LPF
IMM GRANULOCYTES # BLD AUTO: 0.01 K/UL (ref 0–0.11)
IMM GRANULOCYTES NFR BLD AUTO: 0.2 % (ref 0–0.9)
KETONES UR STRIP.AUTO-MCNC: ABNORMAL MG/DL
LEUKOCYTE ESTERASE UR QL STRIP.AUTO: ABNORMAL
LYMPHOCYTES # BLD AUTO: 1.85 K/UL (ref 1–4.8)
LYMPHOCYTES NFR BLD: 31.8 % (ref 22–41)
MAGNESIUM SERPL-MCNC: 1.9 MG/DL (ref 1.5–2.5)
MCH RBC QN AUTO: 31.3 PG (ref 27–33)
MCHC RBC AUTO-ENTMCNC: 34.2 G/DL (ref 32.2–35.5)
MCV RBC AUTO: 91.6 FL (ref 81.4–97.8)
MICRO URNS: ABNORMAL
MONOCYTES # BLD AUTO: 0.61 K/UL (ref 0–0.85)
MONOCYTES NFR BLD AUTO: 10.5 % (ref 0–13.4)
MUCOUS THREADS #/AREA URNS HPF: ABNORMAL /HPF
NEUTROPHILS # BLD AUTO: 2.96 K/UL (ref 1.82–7.42)
NEUTROPHILS NFR BLD: 50.8 % (ref 44–72)
NITRITE UR QL STRIP.AUTO: NEGATIVE
NRBC # BLD AUTO: 0 K/UL
NRBC BLD-RTO: 0 /100 WBC (ref 0–0.2)
NT-PROBNP SERPL IA-MCNC: 70 PG/ML (ref 0–125)
PH UR STRIP.AUTO: 5.5 [PH] (ref 5–8)
PLATELET # BLD AUTO: 180 K/UL (ref 164–446)
PMV BLD AUTO: 11.3 FL (ref 9–12.9)
POTASSIUM SERPL-SCNC: 3.9 MMOL/L (ref 3.6–5.5)
PROT SERPL-MCNC: 6.4 G/DL (ref 6–8.2)
PROT UR QL STRIP: NEGATIVE MG/DL
RBC # BLD AUTO: 4.76 M/UL (ref 4.2–5.4)
RBC # URNS HPF: ABNORMAL /HPF
RBC UR QL AUTO: NEGATIVE
SODIUM SERPL-SCNC: 141 MMOL/L (ref 135–145)
SP GR UR STRIP.AUTO: 1.03
TROPONIN T SERPL-MCNC: 7 NG/L (ref 6–19)
TSH SERPL DL<=0.005 MIU/L-ACNC: 2.86 UIU/ML (ref 0.38–5.33)
UROBILINOGEN UR STRIP.AUTO-MCNC: 0.2 MG/DL
WBC # BLD AUTO: 5.8 K/UL (ref 4.8–10.8)
WBC #/AREA URNS HPF: ABNORMAL /HPF

## 2024-07-17 PROCEDURE — 93005 ELECTROCARDIOGRAM TRACING: CPT

## 2024-07-17 PROCEDURE — 83880 ASSAY OF NATRIURETIC PEPTIDE: CPT

## 2024-07-17 PROCEDURE — 83735 ASSAY OF MAGNESIUM: CPT

## 2024-07-17 PROCEDURE — 81001 URINALYSIS AUTO W/SCOPE: CPT

## 2024-07-17 PROCEDURE — 96374 THER/PROPH/DIAG INJ IV PUSH: CPT

## 2024-07-17 PROCEDURE — 84703 CHORIONIC GONADOTROPIN ASSAY: CPT

## 2024-07-17 PROCEDURE — 99284 EMERGENCY DEPT VISIT MOD MDM: CPT

## 2024-07-17 PROCEDURE — 71045 X-RAY EXAM CHEST 1 VIEW: CPT

## 2024-07-17 PROCEDURE — 84443 ASSAY THYROID STIM HORMONE: CPT

## 2024-07-17 PROCEDURE — 85025 COMPLETE CBC W/AUTO DIFF WBC: CPT

## 2024-07-17 PROCEDURE — 93005 ELECTROCARDIOGRAM TRACING: CPT | Performed by: EMERGENCY MEDICINE

## 2024-07-17 PROCEDURE — 36415 COLL VENOUS BLD VENIPUNCTURE: CPT

## 2024-07-17 PROCEDURE — 700111 HCHG RX REV CODE 636 W/ 250 OVERRIDE (IP): Mod: JZ,UD | Performed by: EMERGENCY MEDICINE

## 2024-07-17 PROCEDURE — 80053 COMPREHEN METABOLIC PANEL: CPT

## 2024-07-17 PROCEDURE — 84484 ASSAY OF TROPONIN QUANT: CPT

## 2024-07-17 RX ORDER — HALOPERIDOL 5 MG/ML
2.5 INJECTION INTRAMUSCULAR ONCE
Status: COMPLETED | OUTPATIENT
Start: 2024-07-17 | End: 2024-07-17

## 2024-07-17 RX ADMIN — HALOPERIDOL LACTATE 2.5 MG: 5 INJECTION, SOLUTION INTRAMUSCULAR at 19:24

## 2024-07-17 ASSESSMENT — PAIN DESCRIPTION - PAIN TYPE
TYPE: ACUTE PAIN;CHRONIC PAIN
TYPE: ACUTE PAIN
TYPE: ACUTE PAIN

## 2024-07-17 ASSESSMENT — FIBROSIS 4 INDEX: FIB4 SCORE: 0.65

## 2024-07-22 ENCOUNTER — OFFICE VISIT (OUTPATIENT)
Dept: URGENT CARE | Facility: CLINIC | Age: 34
End: 2024-07-22
Payer: MEDICARE

## 2024-07-22 VITALS
RESPIRATION RATE: 12 BRPM | OXYGEN SATURATION: 98 % | SYSTOLIC BLOOD PRESSURE: 114 MMHG | DIASTOLIC BLOOD PRESSURE: 78 MMHG | BODY MASS INDEX: 44.39 KG/M2 | WEIGHT: 260 LBS | HEART RATE: 87 BPM | TEMPERATURE: 98 F | HEIGHT: 64 IN

## 2024-07-22 DIAGNOSIS — L72.9 CYST OF SKIN: ICD-10-CM

## 2024-07-22 PROCEDURE — 99213 OFFICE O/P EST LOW 20 MIN: CPT

## 2024-07-22 PROCEDURE — 3074F SYST BP LT 130 MM HG: CPT

## 2024-07-22 PROCEDURE — 3078F DIAST BP <80 MM HG: CPT

## 2024-07-22 ASSESSMENT — FIBROSIS 4 INDEX: FIB4 SCORE: 0.69

## 2024-07-22 ASSESSMENT — ENCOUNTER SYMPTOMS: FEVER: 0

## 2024-07-23 ENCOUNTER — OFFICE VISIT (OUTPATIENT)
Dept: CARDIOLOGY | Facility: MEDICAL CENTER | Age: 34
End: 2024-07-23
Payer: MEDICARE

## 2024-07-23 VITALS
HEIGHT: 64 IN | BODY MASS INDEX: 44.39 KG/M2 | OXYGEN SATURATION: 95 % | DIASTOLIC BLOOD PRESSURE: 84 MMHG | SYSTOLIC BLOOD PRESSURE: 110 MMHG | HEART RATE: 91 BPM | WEIGHT: 260 LBS

## 2024-07-23 DIAGNOSIS — G90.A POSTURAL ORTHOSTATIC TACHYCARDIA SYNDROME: ICD-10-CM

## 2024-07-23 DIAGNOSIS — R60.0 BILATERAL LOWER EXTREMITY EDEMA: ICD-10-CM

## 2024-07-23 DIAGNOSIS — I47.11 INAPPROPRIATE SINUS TACHYCARDIA (HCC): ICD-10-CM

## 2024-07-23 PROCEDURE — 99213 OFFICE O/P EST LOW 20 MIN: CPT

## 2024-07-23 PROCEDURE — 3079F DIAST BP 80-89 MM HG: CPT

## 2024-07-23 PROCEDURE — 3074F SYST BP LT 130 MM HG: CPT

## 2024-07-23 ASSESSMENT — ENCOUNTER SYMPTOMS
SYNCOPE: 0
DIZZINESS: 0
DYSPNEA ON EXERTION: 0
LIGHT-HEADEDNESS: 0
ORTHOPNEA: 0
HEADACHES: 0
PALPITATIONS: 0
PND: 0
NEAR-SYNCOPE: 0
SHORTNESS OF BREATH: 0

## 2024-07-23 ASSESSMENT — FIBROSIS 4 INDEX: FIB4 SCORE: 0.69

## 2024-07-25 ENCOUNTER — APPOINTMENT (OUTPATIENT)
Dept: RADIOLOGY | Facility: MEDICAL CENTER | Age: 34
End: 2024-07-25
Attending: EMERGENCY MEDICINE
Payer: MEDICARE

## 2024-07-25 ENCOUNTER — HOSPITAL ENCOUNTER (EMERGENCY)
Facility: MEDICAL CENTER | Age: 34
End: 2024-07-25
Attending: EMERGENCY MEDICINE
Payer: MEDICARE

## 2024-07-25 VITALS
SYSTOLIC BLOOD PRESSURE: 124 MMHG | TEMPERATURE: 98.4 F | OXYGEN SATURATION: 96 % | HEART RATE: 72 BPM | HEIGHT: 64 IN | BODY MASS INDEX: 44.41 KG/M2 | DIASTOLIC BLOOD PRESSURE: 72 MMHG | RESPIRATION RATE: 18 BRPM | WEIGHT: 260.14 LBS

## 2024-07-25 DIAGNOSIS — R07.9 CHEST PAIN, UNSPECIFIED TYPE: ICD-10-CM

## 2024-07-25 LAB
ALBUMIN SERPL BCP-MCNC: 4.7 G/DL (ref 3.2–4.9)
ALBUMIN/GLOB SERPL: 2.4 G/DL
ALP SERPL-CCNC: 80 U/L (ref 30–99)
ALT SERPL-CCNC: 37 U/L (ref 2–50)
ANION GAP SERPL CALC-SCNC: 14 MMOL/L (ref 7–16)
AST SERPL-CCNC: 19 U/L (ref 12–45)
BASOPHILS # BLD AUTO: 0.9 % (ref 0–1.8)
BASOPHILS # BLD: 0.05 K/UL (ref 0–0.12)
BILIRUB SERPL-MCNC: 0.4 MG/DL (ref 0.1–1.5)
BUN SERPL-MCNC: 16 MG/DL (ref 8–22)
CALCIUM ALBUM COR SERPL-MCNC: 8.9 MG/DL (ref 8.5–10.5)
CALCIUM SERPL-MCNC: 9.5 MG/DL (ref 8.4–10.2)
CHLORIDE SERPL-SCNC: 107 MMOL/L (ref 96–112)
CO2 SERPL-SCNC: 19 MMOL/L (ref 20–33)
CREAT SERPL-MCNC: 0.82 MG/DL (ref 0.5–1.4)
D DIMER PPP IA.FEU-MCNC: 0.29 UG/ML (FEU) (ref 0–0.5)
EKG IMPRESSION: NORMAL
EOSINOPHIL # BLD AUTO: 0.32 K/UL (ref 0–0.51)
EOSINOPHIL NFR BLD: 6.1 % (ref 0–6.9)
ERYTHROCYTE [DISTWIDTH] IN BLOOD BY AUTOMATED COUNT: 40.5 FL (ref 35.9–50)
GFR SERPLBLD CREATININE-BSD FMLA CKD-EPI: 96 ML/MIN/1.73 M 2
GLOBULIN SER CALC-MCNC: 2 G/DL (ref 1.9–3.5)
GLUCOSE SERPL-MCNC: 127 MG/DL (ref 65–99)
HCT VFR BLD AUTO: 42.2 % (ref 37–47)
HGB BLD-MCNC: 14.6 G/DL (ref 12–16)
IMM GRANULOCYTES # BLD AUTO: 0.02 K/UL (ref 0–0.11)
IMM GRANULOCYTES NFR BLD AUTO: 0.4 % (ref 0–0.9)
LIPASE SERPL-CCNC: 37 U/L (ref 11–82)
LYMPHOCYTES # BLD AUTO: 1.55 K/UL (ref 1–4.8)
LYMPHOCYTES NFR BLD: 29.4 % (ref 22–41)
MCH RBC QN AUTO: 31.7 PG (ref 27–33)
MCHC RBC AUTO-ENTMCNC: 34.6 G/DL (ref 32.2–35.5)
MCV RBC AUTO: 91.5 FL (ref 81.4–97.8)
MONOCYTES # BLD AUTO: 0.46 K/UL (ref 0–0.85)
MONOCYTES NFR BLD AUTO: 8.7 % (ref 0–13.4)
NEUTROPHILS # BLD AUTO: 2.87 K/UL (ref 1.82–7.42)
NEUTROPHILS NFR BLD: 54.5 % (ref 44–72)
NRBC # BLD AUTO: 0 K/UL
NRBC BLD-RTO: 0 /100 WBC (ref 0–0.2)
NT-PROBNP SERPL IA-MCNC: 41 PG/ML (ref 0–125)
PLATELET # BLD AUTO: 180 K/UL (ref 164–446)
PMV BLD AUTO: 11.3 FL (ref 9–12.9)
POTASSIUM SERPL-SCNC: 4 MMOL/L (ref 3.6–5.5)
PROT SERPL-MCNC: 6.7 G/DL (ref 6–8.2)
RBC # BLD AUTO: 4.61 M/UL (ref 4.2–5.4)
SODIUM SERPL-SCNC: 140 MMOL/L (ref 135–145)
TROPONIN T SERPL-MCNC: <6 NG/L (ref 6–19)
WBC # BLD AUTO: 5.3 K/UL (ref 4.8–10.8)

## 2024-07-25 PROCEDURE — 36415 COLL VENOUS BLD VENIPUNCTURE: CPT

## 2024-07-25 PROCEDURE — 85025 COMPLETE CBC W/AUTO DIFF WBC: CPT

## 2024-07-25 PROCEDURE — 80053 COMPREHEN METABOLIC PANEL: CPT

## 2024-07-25 PROCEDURE — 83880 ASSAY OF NATRIURETIC PEPTIDE: CPT

## 2024-07-25 PROCEDURE — 99284 EMERGENCY DEPT VISIT MOD MDM: CPT

## 2024-07-25 PROCEDURE — 93005 ELECTROCARDIOGRAM TRACING: CPT | Performed by: EMERGENCY MEDICINE

## 2024-07-25 PROCEDURE — 83690 ASSAY OF LIPASE: CPT

## 2024-07-25 PROCEDURE — 84484 ASSAY OF TROPONIN QUANT: CPT

## 2024-07-25 PROCEDURE — 71045 X-RAY EXAM CHEST 1 VIEW: CPT

## 2024-07-25 PROCEDURE — 85379 FIBRIN DEGRADATION QUANT: CPT

## 2024-07-25 ASSESSMENT — FIBROSIS 4 INDEX: FIB4 SCORE: 0.69

## 2024-07-27 DIAGNOSIS — I47.11 INAPPROPRIATE SINUS TACHYCARDIA (HCC): ICD-10-CM

## 2024-07-30 RX ORDER — METOPROLOL SUCCINATE 25 MG/1
25 TABLET, EXTENDED RELEASE ORAL DAILY
Qty: 90 TABLET | Refills: 3 | Status: SHIPPED | OUTPATIENT
Start: 2024-07-30

## 2024-08-07 NOTE — ED TRIAGE NOTES
"Kristin Balderrama  32 y.o.  female  Chief Complaint   Patient presents with    Asthma     Pt seen at  today for asthma exacerbation secondary to an URTI (x 3 days of congestion & cough). Pt given steroids in , decided to come to ED today as she tried to lie down at home \"and it felt like I was drowning\". No audible wheezes in triage.        Patient educated on triage process, to alert staff if any changes in condition.    " Addended by: AUDREY STONER on: 8/7/2024 11:29 AM     Modules accepted: Orders

## 2024-08-11 ENCOUNTER — HOSPITAL ENCOUNTER (INPATIENT)
Facility: MEDICAL CENTER | Age: 34
LOS: 3 days | DRG: 392 | End: 2024-08-14
Attending: EMERGENCY MEDICINE | Admitting: FAMILY MEDICINE
Payer: MEDICARE

## 2024-08-11 ENCOUNTER — APPOINTMENT (OUTPATIENT)
Dept: RADIOLOGY | Facility: MEDICAL CENTER | Age: 34
DRG: 392 | End: 2024-08-11
Attending: EMERGENCY MEDICINE
Payer: MEDICARE

## 2024-08-11 DIAGNOSIS — R11.10 INTRACTABLE VOMITING: ICD-10-CM

## 2024-08-11 DIAGNOSIS — E86.0 DEHYDRATION: ICD-10-CM

## 2024-08-11 DIAGNOSIS — R19.7 DIARRHEA, UNSPECIFIED TYPE: ICD-10-CM

## 2024-08-11 DIAGNOSIS — R79.89 ELEVATED LFTS: ICD-10-CM

## 2024-08-11 DIAGNOSIS — R10.84 GENERALIZED ABDOMINAL PAIN: ICD-10-CM

## 2024-08-11 PROBLEM — E87.20 METABOLIC ACIDOSIS, NAG, BICARBONATE LOSSES: Status: ACTIVE | Noted: 2024-08-11

## 2024-08-11 PROBLEM — R74.8 ELEVATED LIVER ENZYMES: Status: ACTIVE | Noted: 2024-08-11

## 2024-08-11 PROBLEM — R10.9 ABDOMINAL PAIN WITH VOMITING: Status: ACTIVE | Noted: 2024-08-11

## 2024-08-11 LAB
ALBUMIN SERPL BCP-MCNC: 4.4 G/DL (ref 3.2–4.9)
ALBUMIN/GLOB SERPL: 2.2 G/DL
ALP SERPL-CCNC: 120 U/L (ref 30–99)
ALT SERPL-CCNC: 139 U/L (ref 2–50)
ANION GAP SERPL CALC-SCNC: 14 MMOL/L (ref 7–16)
AST SERPL-CCNC: 63 U/L (ref 12–45)
BASOPHILS # BLD AUTO: 0 % (ref 0–1.8)
BASOPHILS # BLD: 0 K/UL (ref 0–0.12)
BILIRUB SERPL-MCNC: 0.4 MG/DL (ref 0.1–1.5)
BUN SERPL-MCNC: 13 MG/DL (ref 8–22)
CALCIUM ALBUM COR SERPL-MCNC: 9.1 MG/DL (ref 8.5–10.5)
CALCIUM SERPL-MCNC: 9.4 MG/DL (ref 8.5–10.5)
CHLORIDE SERPL-SCNC: 109 MMOL/L (ref 96–112)
CO2 SERPL-SCNC: 15 MMOL/L (ref 20–33)
CREAT SERPL-MCNC: 0.81 MG/DL (ref 0.5–1.4)
EOSINOPHIL # BLD AUTO: 0.33 K/UL (ref 0–0.51)
EOSINOPHIL NFR BLD: 7.8 % (ref 0–6.9)
ERYTHROCYTE [DISTWIDTH] IN BLOOD BY AUTOMATED COUNT: 41.4 FL (ref 35.9–50)
GFR SERPLBLD CREATININE-BSD FMLA CKD-EPI: 97 ML/MIN/1.73 M 2
GLOBULIN SER CALC-MCNC: 2 G/DL (ref 1.9–3.5)
GLUCOSE SERPL-MCNC: 145 MG/DL (ref 65–99)
HCG SERPL QL: NEGATIVE
HCT VFR BLD AUTO: 40.9 % (ref 37–47)
HGB BLD-MCNC: 14.2 G/DL (ref 12–16)
LIPASE SERPL-CCNC: 24 U/L (ref 11–82)
LYMPHOCYTES # BLD AUTO: 1.48 K/UL (ref 1–4.8)
LYMPHOCYTES NFR BLD: 35.3 % (ref 22–41)
MANUAL DIFF BLD: NORMAL
MCH RBC QN AUTO: 31.7 PG (ref 27–33)
MCHC RBC AUTO-ENTMCNC: 34.7 G/DL (ref 32.2–35.5)
MCV RBC AUTO: 91.3 FL (ref 81.4–97.8)
MICROCYTES BLD QL SMEAR: ABNORMAL
MONOCYTES # BLD AUTO: 0.43 K/UL (ref 0–0.85)
MONOCYTES NFR BLD AUTO: 10.3 % (ref 0–13.4)
MORPHOLOGY BLD-IMP: NORMAL
NEUTROPHILS # BLD AUTO: 1.96 K/UL (ref 1.82–7.42)
NEUTROPHILS NFR BLD: 46.6 % (ref 44–72)
NRBC # BLD AUTO: 0 K/UL
NRBC BLD-RTO: 0 /100 WBC (ref 0–0.2)
PLATELET # BLD AUTO: 172 K/UL (ref 164–446)
PLATELET BLD QL SMEAR: NORMAL
PMV BLD AUTO: 11 FL (ref 9–12.9)
POIKILOCYTOSIS BLD QL SMEAR: NORMAL
POTASSIUM SERPL-SCNC: 4.2 MMOL/L (ref 3.6–5.5)
PROT SERPL-MCNC: 6.4 G/DL (ref 6–8.2)
RBC # BLD AUTO: 4.48 M/UL (ref 4.2–5.4)
RBC BLD AUTO: PRESENT
SODIUM SERPL-SCNC: 138 MMOL/L (ref 135–145)
STOMATOCYTES BLD QL SMEAR: NORMAL
WBC # BLD AUTO: 4.2 K/UL (ref 4.8–10.8)

## 2024-08-11 PROCEDURE — 83690 ASSAY OF LIPASE: CPT

## 2024-08-11 PROCEDURE — 700111 HCHG RX REV CODE 636 W/ 250 OVERRIDE (IP)

## 2024-08-11 PROCEDURE — 99222 1ST HOSP IP/OBS MODERATE 55: CPT | Mod: AI,GC | Performed by: FAMILY MEDICINE

## 2024-08-11 PROCEDURE — 700111 HCHG RX REV CODE 636 W/ 250 OVERRIDE (IP): Mod: JZ | Performed by: EMERGENCY MEDICINE

## 2024-08-11 PROCEDURE — 96374 THER/PROPH/DIAG INJ IV PUSH: CPT

## 2024-08-11 PROCEDURE — A9270 NON-COVERED ITEM OR SERVICE: HCPCS

## 2024-08-11 PROCEDURE — 85027 COMPLETE CBC AUTOMATED: CPT

## 2024-08-11 PROCEDURE — 700105 HCHG RX REV CODE 258

## 2024-08-11 PROCEDURE — 96375 TX/PRO/DX INJ NEW DRUG ADDON: CPT

## 2024-08-11 PROCEDURE — 36415 COLL VENOUS BLD VENIPUNCTURE: CPT

## 2024-08-11 PROCEDURE — 700102 HCHG RX REV CODE 250 W/ 637 OVERRIDE(OP)

## 2024-08-11 PROCEDURE — 99285 EMERGENCY DEPT VISIT HI MDM: CPT

## 2024-08-11 PROCEDURE — 76705 ECHO EXAM OF ABDOMEN: CPT

## 2024-08-11 PROCEDURE — 84703 CHORIONIC GONADOTROPIN ASSAY: CPT

## 2024-08-11 PROCEDURE — 700105 HCHG RX REV CODE 258: Mod: UD | Performed by: EMERGENCY MEDICINE

## 2024-08-11 PROCEDURE — 80053 COMPREHEN METABOLIC PANEL: CPT

## 2024-08-11 PROCEDURE — 770006 HCHG ROOM/CARE - MED/SURG/GYN SEMI*

## 2024-08-11 PROCEDURE — 85007 BL SMEAR W/DIFF WBC COUNT: CPT

## 2024-08-11 PROCEDURE — 96372 THER/PROPH/DIAG INJ SC/IM: CPT

## 2024-08-11 RX ORDER — ENOXAPARIN SODIUM 100 MG/ML
40 INJECTION SUBCUTANEOUS DAILY
Status: DISCONTINUED | OUTPATIENT
Start: 2024-08-11 | End: 2024-08-14 | Stop reason: HOSPADM

## 2024-08-11 RX ORDER — PROCHLORPERAZINE EDISYLATE 5 MG/ML
5-10 INJECTION INTRAMUSCULAR; INTRAVENOUS EVERY 4 HOURS PRN
Status: DISCONTINUED | OUTPATIENT
Start: 2024-08-11 | End: 2024-08-11

## 2024-08-11 RX ORDER — PHENOL 1.4 %
10 AEROSOL, SPRAY (ML) MUCOUS MEMBRANE
COMMUNITY

## 2024-08-11 RX ORDER — SODIUM CHLORIDE 9 MG/ML
1000 INJECTION, SOLUTION INTRAVENOUS ONCE
Status: COMPLETED | OUTPATIENT
Start: 2024-08-11 | End: 2024-08-11

## 2024-08-11 RX ORDER — AMLODIPINE BESYLATE 5 MG/1
5 TABLET ORAL DAILY
Status: DISCONTINUED | OUTPATIENT
Start: 2024-08-12 | End: 2024-08-14 | Stop reason: HOSPADM

## 2024-08-11 RX ORDER — GABAPENTIN 400 MG/1
1200 CAPSULE ORAL 3 TIMES DAILY
Status: DISCONTINUED | OUTPATIENT
Start: 2024-08-11 | End: 2024-08-14 | Stop reason: HOSPADM

## 2024-08-11 RX ORDER — LAMOTRIGINE 100 MG/1
50 TABLET ORAL 2 TIMES DAILY
Status: DISCONTINUED | OUTPATIENT
Start: 2024-08-11 | End: 2024-08-14 | Stop reason: HOSPADM

## 2024-08-11 RX ORDER — ZIPRASIDONE HYDROCHLORIDE 40 MG/1
40 CAPSULE ORAL
Status: DISCONTINUED | OUTPATIENT
Start: 2024-08-11 | End: 2024-08-14 | Stop reason: HOSPADM

## 2024-08-11 RX ORDER — SPIRONOLACTONE 25 MG/1
25 TABLET ORAL DAILY
Status: DISCONTINUED | OUTPATIENT
Start: 2024-08-12 | End: 2024-08-14 | Stop reason: HOSPADM

## 2024-08-11 RX ORDER — TOPIRAMATE 25 MG/1
50 TABLET, FILM COATED ORAL 2 TIMES DAILY
Status: DISCONTINUED | OUTPATIENT
Start: 2024-08-11 | End: 2024-08-14 | Stop reason: HOSPADM

## 2024-08-11 RX ORDER — METOPROLOL SUCCINATE 25 MG/1
25 TABLET, EXTENDED RELEASE ORAL DAILY
Status: DISCONTINUED | OUTPATIENT
Start: 2024-08-12 | End: 2024-08-14 | Stop reason: HOSPADM

## 2024-08-11 RX ORDER — ONDANSETRON 2 MG/ML
4 INJECTION INTRAMUSCULAR; INTRAVENOUS EVERY 4 HOURS PRN
Status: DISCONTINUED | OUTPATIENT
Start: 2024-08-11 | End: 2024-08-11

## 2024-08-11 RX ORDER — TOPIRAMATE 50 MG/1
50 TABLET, FILM COATED ORAL 2 TIMES DAILY
COMMUNITY

## 2024-08-11 RX ORDER — METOCLOPRAMIDE HYDROCHLORIDE 5 MG/ML
10 INJECTION INTRAMUSCULAR; INTRAVENOUS ONCE
Status: COMPLETED | OUTPATIENT
Start: 2024-08-11 | End: 2024-08-11

## 2024-08-11 RX ORDER — SODIUM CHLORIDE 9 MG/ML
INJECTION, SOLUTION INTRAVENOUS CONTINUOUS
Status: DISCONTINUED | OUTPATIENT
Start: 2024-08-11 | End: 2024-08-13

## 2024-08-11 RX ORDER — SUMATRIPTAN 5 MG/1
1 SPRAY NASAL PRN
COMMUNITY

## 2024-08-11 RX ORDER — PROMETHAZINE HYDROCHLORIDE 25 MG/1
12.5-25 TABLET ORAL EVERY 4 HOURS PRN
Status: DISCONTINUED | OUTPATIENT
Start: 2024-08-11 | End: 2024-08-11

## 2024-08-11 RX ORDER — LORAZEPAM 2 MG/ML
1 INJECTION INTRAMUSCULAR EVERY 4 HOURS PRN
Status: DISCONTINUED | OUTPATIENT
Start: 2024-08-11 | End: 2024-08-14 | Stop reason: HOSPADM

## 2024-08-11 RX ORDER — IVABRADINE 7.5 MG/1
7.5 TABLET, FILM COATED ORAL 2 TIMES DAILY WITH MEALS
Status: DISCONTINUED | OUTPATIENT
Start: 2024-08-11 | End: 2024-08-14 | Stop reason: HOSPADM

## 2024-08-11 RX ORDER — MORPHINE SULFATE 4 MG/ML
4 INJECTION INTRAVENOUS ONCE
Status: COMPLETED | OUTPATIENT
Start: 2024-08-11 | End: 2024-08-11

## 2024-08-11 RX ORDER — ONDANSETRON 4 MG/1
4 TABLET, ORALLY DISINTEGRATING ORAL EVERY 4 HOURS PRN
Status: DISCONTINUED | OUTPATIENT
Start: 2024-08-11 | End: 2024-08-11

## 2024-08-11 RX ORDER — DIPHENHYDRAMINE HCL 25 MG
25-50 TABLET ORAL 2 TIMES DAILY
COMMUNITY

## 2024-08-11 RX ORDER — PROMETHAZINE HYDROCHLORIDE 25 MG/1
12.5-25 SUPPOSITORY RECTAL EVERY 4 HOURS PRN
Status: DISCONTINUED | OUTPATIENT
Start: 2024-08-11 | End: 2024-08-11

## 2024-08-11 RX ADMIN — SODIUM CHLORIDE: 9 INJECTION, SOLUTION INTRAVENOUS at 22:54

## 2024-08-11 RX ADMIN — METOCLOPRAMIDE 10 MG: 5 INJECTION, SOLUTION INTRAMUSCULAR; INTRAVENOUS at 17:50

## 2024-08-11 RX ADMIN — ZIPRASIDONE HYDROCHLORIDE 40 MG: 40 CAPSULE ORAL at 21:32

## 2024-08-11 RX ADMIN — OMEPRAZOLE 20 MG: 20 CAPSULE, DELAYED RELEASE ORAL at 21:32

## 2024-08-11 RX ADMIN — TOPIRAMATE 50 MG: 25 TABLET, FILM COATED ORAL at 21:33

## 2024-08-11 RX ADMIN — IVABRADINE 7.5 MG: 7.5 TABLET, FILM COATED ORAL at 23:09

## 2024-08-11 RX ADMIN — Medication 10 MG: at 21:31

## 2024-08-11 RX ADMIN — LORAZEPAM 1 MG: 2 INJECTION INTRAMUSCULAR; INTRAVENOUS at 22:47

## 2024-08-11 RX ADMIN — LAMOTRIGINE 50 MG: 25 TABLET ORAL at 21:33

## 2024-08-11 RX ADMIN — SODIUM CHLORIDE: 9 INJECTION, SOLUTION INTRAVENOUS at 19:48

## 2024-08-11 RX ADMIN — ENOXAPARIN SODIUM 40 MG: 100 INJECTION SUBCUTANEOUS at 19:47

## 2024-08-11 RX ADMIN — SODIUM CHLORIDE 1000 ML: 9 INJECTION, SOLUTION INTRAVENOUS at 17:49

## 2024-08-11 RX ADMIN — MORPHINE SULFATE 4 MG: 4 INJECTION, SOLUTION INTRAMUSCULAR; INTRAVENOUS at 17:50

## 2024-08-11 RX ADMIN — GABAPENTIN 1200 MG: 400 CAPSULE ORAL at 21:32

## 2024-08-11 RX ADMIN — SODIUM CHLORIDE 1000 ML: 9 INJECTION, SOLUTION INTRAVENOUS at 12:30

## 2024-08-11 ASSESSMENT — LIFESTYLE VARIABLES
HAVE YOU EVER FELT YOU SHOULD CUT DOWN ON YOUR DRINKING: NO
TOTAL SCORE: 0
ON A TYPICAL DAY WHEN YOU DRINK ALCOHOL HOW MANY DRINKS DO YOU HAVE: 0
AVERAGE NUMBER OF DAYS PER WEEK YOU HAVE A DRINK CONTAINING ALCOHOL: 0
ALCOHOL_USE: NO
HAVE PEOPLE ANNOYED YOU BY CRITICIZING YOUR DRINKING: NO
TOTAL SCORE: 0
EVER FELT BAD OR GUILTY ABOUT YOUR DRINKING: NO
EVER HAD A DRINK FIRST THING IN THE MORNING TO STEADY YOUR NERVES TO GET RID OF A HANGOVER: NO
HOW MANY TIMES IN THE PAST YEAR HAVE YOU HAD 5 OR MORE DRINKS IN A DAY: 0
CONSUMPTION TOTAL: NEGATIVE
DOES PATIENT WANT TO STOP DRINKING: NO
TOTAL SCORE: 0

## 2024-08-11 ASSESSMENT — COGNITIVE AND FUNCTIONAL STATUS - GENERAL
SUGGESTED CMS G CODE MODIFIER MOBILITY: CH
SUGGESTED CMS G CODE MODIFIER DAILY ACTIVITY: CH
MOBILITY SCORE: 24
DAILY ACTIVITIY SCORE: 24

## 2024-08-11 ASSESSMENT — SOCIAL DETERMINANTS OF HEALTH (SDOH)
WITHIN THE PAST 12 MONTHS, THE FOOD YOU BOUGHT JUST DIDN'T LAST AND YOU DIDN'T HAVE MONEY TO GET MORE: NEVER TRUE
WITHIN THE LAST YEAR, HAVE YOU BEEN KICKED, HIT, SLAPPED, OR OTHERWISE PHYSICALLY HURT BY YOUR PARTNER OR EX-PARTNER?: NO
WITHIN THE LAST YEAR, HAVE TO BEEN RAPED OR FORCED TO HAVE ANY KIND OF SEXUAL ACTIVITY BY YOUR PARTNER OR EX-PARTNER?: NO
WITHIN THE LAST YEAR, HAVE YOU BEEN HUMILIATED OR EMOTIONALLY ABUSED IN OTHER WAYS BY YOUR PARTNER OR EX-PARTNER?: NO
IN THE PAST 12 MONTHS, HAS THE ELECTRIC, GAS, OIL, OR WATER COMPANY THREATENED TO SHUT OFF SERVICE IN YOUR HOME?: NO
WITHIN THE LAST YEAR, HAVE YOU BEEN AFRAID OF YOUR PARTNER OR EX-PARTNER?: NO
WITHIN THE PAST 12 MONTHS, YOU WORRIED THAT YOUR FOOD WOULD RUN OUT BEFORE YOU GOT THE MONEY TO BUY MORE: NEVER TRUE

## 2024-08-11 ASSESSMENT — PAIN DESCRIPTION - PAIN TYPE
TYPE: ACUTE PAIN
TYPE: ACUTE PAIN

## 2024-08-11 ASSESSMENT — FIBROSIS 4 INDEX
FIB4 SCORE: 1.06
FIB4 SCORE: 0.59

## 2024-08-11 NOTE — ED PROVIDER NOTES
ED Provider Note    CHIEF COMPLAINT  Chief Complaint   Patient presents with    Abdominal Pain    Vomiting    N/V    Diarrhea       EXTERNAL RECORDS REVIEWED  Inpatient Notes ED note 7/25/24 when the patient was evaluated for chest pain and shortness of breath.  She had a negative workup in the ED.    HPI/ROS  LIMITATION TO HISTORY   Select: : None  OUTSIDE HISTORIAN(S):  None    Kristin Balderrama is a 33 y.o. female who presents to the emergency department for evaluation of abdominal pain, vomiting, and diarrhea.  The patient states that she started having multiple episodes of watery stool 7 days ago.  It has been persistent since then.  She states that over the last day she has been having vomiting.  She states that she has had 1 episode of nonbloody nonbilious emesis today.  She states that she is having generalized abdominal cramping as well.  She does have a history of multiple abdominal surgeries.  She denies any fevers.  She denies chest pain or shortness of breath.  She has had a runny nose but no persistent cough.  She has had diminished urination and does admit to some dysuria.  She denies any hematuria.    PAST MEDICAL HISTORY   has a past medical history of Abdominal pain, Anginal syndrome, Apnea, sleep, Arrhythmia, Arthritis, ASTHMA, Back pain, Borderline personality disorder (HCC), Breath shortness, Bronchitis (02/08/2022), Cardiac arrhythmia, Chickenpox, Chronic UTI (09/18/2010), Cough, Daytime sleepiness, Dental disorder (03/08/2021), Depression, Diabetes (HCC), Diarrhea, Disorder of thyroid, Fall, Fatigue, Frequent headaches, Gasping for breath, Gynecological disorder, Headache(784.0), Heart burn, Heart murmur, History of falling, Indigestion, Migraine, Mitochondrial disease (HCC), Multiple personality disorder (HCC), Nausea, Obesity, Other fatigue (06/29/2020), Pain (08/15/2012), Painful joint, PCOS (polycystic ovarian syndrome), Pneumonia (2012 12/2020), POTS (postural orthostatic tachycardia  syndrome), Psychosis (HCC), Ringing in ears, Scoliosis, Shortness of breath, Sinus tachycardia (10/31/2013), Sleep apnea, Snoring, Supraventricular tachycardia (HCC) (4/10/2019), Tonsillitis, Transverse myelitis (HCC), Tuberculosis, Urinary bladder disorder, Urinary incontinence, Weakness, and Wears glasses.    SURGICAL HISTORY   has a past surgical history that includes neuro dest facet l/s w/ig sngl (04/21/2015); recovery (01/27/2016); [place] by laparoscopy (08/29/2010); lumbar fusion anterior (08/21/2012); other cardiac surgery (01/2016); tonsillectomy; bowel stimulator insertion (07/13/2016); gastroscopy with balloon dilatation (N/A, 01/04/2017); muscle biopsy (Right, 01/26/2017); other abdominal surgery; laminotomy; bowel stimulator insertion (03/10/2021); lap,diagnostic abdomen (02/14/2022); ovarian cystectomy (Right, 02/14/2022); percut fix prox/neck femur fx (Left, 01/28/2023); inguinal hernia laparoscopic (Right, 07/21/2023); hernia repair (Right, 07/21/2023); cystoscopy,insert ureteral stent (Right, 2/12/2024); cysto/uretero/pyeloscopy, dx (Right, 2/12/2024); and lasertripsy (Right, 2/12/2024).    FAMILY HISTORY  Family History   Problem Relation Age of Onset    Hypertension Mother     Sleep Apnea Mother     Heart Disease Mother     Other Mother         hypothryod    Hypertension Maternal Uncle     Heart Disease Maternal Grandmother     Hypertension Maternal Grandmother     No Known Problems Sister     Other Sister         Narcolepsy;fibromyalsia;bone;nerve    Genitourinary () Problems Sister         endometriosis       SOCIAL HISTORY  Social History     Tobacco Use    Smoking status: Never    Smokeless tobacco: Never   Vaping Use    Vaping status: Never Used   Substance and Sexual Activity    Alcohol use: No     Alcohol/week: 0.0 oz    Drug use: Never     Frequency: 7.0 times per week    Sexual activity: Not Currently     Birth control/protection: Implant       CURRENT MEDICATIONS  Home Medications   "  **Home medications have not yet been reviewed for this encounter**       Audit from Redirected Encounters    **Home medications have not yet been reviewed for this encounter**         ALLERGIES  Allergies   Allergen Reactions    Cefdinir Shortness of Breath and Itching     Tolerated 1/18/17  Tolerates ceftriaxone  Tolerated augmentin 8/2019     Depakote [Divalproex Sodium] Unspecified     Muscle spasms/muscle pain and weakness      Depakote [Divalproex Sodium]      Muscle spasms/muscle pain and weakness    Doxycycline Anaphylaxis and Vomiting     Other reaction(s): pustules/blisters  Other reaction(s): stomach pain    Montelukast      Cardiac effusion    Montelukast [Singulair] Unspecified     Cardiac effusion    Vancomycin Itching     Pt becomes flushed in face and chest.   RXN=7/10/16    Wound Dressing Adhesive Rash     By pt report-\"removes skin totally off\"    Amitriptyline Unspecified     Headaches      Amoxicillin Rash          Aripiprazole Unspecified     Headaches/muscle twitching      Aripiprazole [Abilify] Unspecified     Headaches/muscle twitching      Clindamycin Nausea         Other reaction(s): nausea stomach pain    Erythromycin Rash     .  Other reaction(s): nausea stomach pain    Flomax [Tamsulosin Hydrochloride] Swelling    Hydromorphone      Other reaction(s): vomiting    Levaquin Unspecified     Severe muscle cramps in legs  RXN=unknown  Other reaction(s): leg muscle cramps    Metformin Unspecified     Increased lactic acid     Other reaction(s): itching and rash/nausea vomiting    Sulfa Drugs Hives, Itching, Myalgia and Unspecified     Muscle pain and weakness    Other reaction(s): unknown    Tamsulosin Swelling     Swelling of legs    Tape Rash     Tears skin off  coban with Tegaderm tape ok intermittently  RXN=ongoing    Sulfamethoxazole W-Trimethoprim Rash    Ampicillin Rash     Pt reports that she received a rash     Ciprofloxacin Rash          Keflex Rash     Pt states she gets a rash " "with this medication  Tolerates ceftriaxone  Can take with Benadryl    Levofloxacin Unspecified     Leg muscle cramps    Metronidazole Rash     \"Vision problems\"  Other reaction(s): vision problems    Penicillins Hives     Can take with Benadryl    Valproic Acid Rash     .     PHYSICAL EXAM  VITAL SIGNS: /68   Pulse 74   Temp 36.4 °C (97.5 °F) (Temporal)   Resp 15   Ht 1.626 m (5' 4\")   Wt 118 kg (259 lb 7.7 oz)   SpO2 97%   BMI 44.54 kg/m²   Constitutional: Alert and in no apparent distress.  HENT: Normocephalic atraumatic. Bilateral external ears normal. Nose normal. Mucous membranes are dry.  Eyes: Pupils are equal and reactive. Conjunctiva normal. Non-icteric sclera.   Neck: Normal range of motion without tenderness. Supple. No meningeal signs.  Cardiovascular: Regular rate and rhythm. No murmurs, gallops or rubs.  Thorax & Lungs: No increased work of breathing. Breath sounds are clear to auscultation bilaterally. No wheezing, rhonchi or rales.  Abdomen: Soft, nontender and nondistended.   Skin: Warm and dry. No rashes are noted.  Back: No bony tenderness, No CVA tenderness.   Extremities: 2+ peripheral pulses. Cap refill is less than 2 seconds. No edema, cyanosis, or clubbing.  Musculoskeletal: Good range of motion in all major joints. No tenderness to palpation or major deformities noted.   Neurologic: Alert and oriented x 3. The patient moves all 4 extremities without obvious deficits.    LABS  Results for orders placed or performed during the hospital encounter of 08/11/24   CBC WITH DIFFERENTIAL   Result Value Ref Range    WBC 4.2 (L) 4.8 - 10.8 K/uL    RBC 4.48 4.20 - 5.40 M/uL    Hemoglobin 14.2 12.0 - 16.0 g/dL    Hematocrit 40.9 37.0 - 47.0 %    MCV 91.3 81.4 - 97.8 fL    MCH 31.7 27.0 - 33.0 pg    MCHC 34.7 32.2 - 35.5 g/dL    RDW 41.4 35.9 - 50.0 fL    Platelet Count 172 164 - 446 K/uL    MPV 11.0 9.0 - 12.9 fL    Neutrophils-Polys 46.60 44.00 - 72.00 %    Lymphocytes 35.30 22.00 - 41.00 " %    Monocytes 10.30 0.00 - 13.40 %    Eosinophils 7.80 (H) 0.00 - 6.90 %    Basophils 0.00 0.00 - 1.80 %    Nucleated RBC 0.00 0.00 - 0.20 /100 WBC    Neutrophils (Absolute) 1.96 1.82 - 7.42 K/uL    Lymphs (Absolute) 1.48 1.00 - 4.80 K/uL    Monos (Absolute) 0.43 0.00 - 0.85 K/uL    Eos (Absolute) 0.33 0.00 - 0.51 K/uL    Baso (Absolute) 0.00 0.00 - 0.12 K/uL    NRBC (Absolute) 0.00 K/uL    Microcytosis 2+ (A)    COMP METABOLIC PANEL   Result Value Ref Range    Sodium 138 135 - 145 mmol/L    Potassium 4.2 3.6 - 5.5 mmol/L    Chloride 109 96 - 112 mmol/L    Co2 15 (L) 20 - 33 mmol/L    Anion Gap 14.0 7.0 - 16.0    Glucose 145 (H) 65 - 99 mg/dL    Bun 13 8 - 22 mg/dL    Creatinine 0.81 0.50 - 1.40 mg/dL    Calcium 9.4 8.5 - 10.5 mg/dL    Correct Calcium 9.1 8.5 - 10.5 mg/dL    AST(SGOT) 63 (H) 12 - 45 U/L    ALT(SGPT) 139 (H) 2 - 50 U/L    Alkaline Phosphatase 120 (H) 30 - 99 U/L    Total Bilirubin 0.4 0.1 - 1.5 mg/dL    Albumin 4.4 3.2 - 4.9 g/dL    Total Protein 6.4 6.0 - 8.2 g/dL    Globulin 2.0 1.9 - 3.5 g/dL    A-G Ratio 2.2 g/dL   LIPASE   Result Value Ref Range    Lipase 24 11 - 82 U/L   HCG QUAL SERUM   Result Value Ref Range    Beta-Hcg Qualitative Serum Negative Negative   ESTIMATED GFR   Result Value Ref Range    GFR (CKD-EPI) 97 >60 mL/min/1.73 m 2   DIFFERENTIAL MANUAL   Result Value Ref Range    Manual Diff Status PERFORMED    PERIPHERAL SMEAR REVIEW   Result Value Ref Range    Peripheral Smear Review see below    PLATELET ESTIMATE   Result Value Ref Range    Plt Estimation Normal    MORPHOLOGY   Result Value Ref Range    RBC Morphology Present     Poikilocytosis 1+     Stomatocytes 1+      *Note: Due to a large number of results and/or encounters for the requested time period, some results have not been displayed. A complete set of results can be found in Results Review.     RADIOLOGY/PROCEDURES   I have independently interpreted the diagnostic imaging associated with this visit and am waiting the  final reading from the radiologist.   My preliminary interpretation is as follows: No obvious gallstones noted    Radiologist interpretation:  US-RUQ   Final Result      1.  Hepatomegaly with evidence of hepatic steatosis        COURSE & MEDICAL DECISION MAKING    ASSESSMENT, COURSE AND PLAN  Care Narrative: This is a 33-year-old female presenting to the emergency department for evaluation of abdominal pain, vomiting and diarrhea.  On initial evaluation, the patient did not appear to be in any acute distress.  Vital signs are reassuring.  Physical exam was reassuring with a benign abdomen.  She did have dry mucous membranes.  She admitted to just generalized abdominal discomfort as cramping in nature.  I suspect she likely has a viral gastroenteritis and I am less concerned for obstruction, acute appendicitis, acute cholecystitis, or pancreatitis.    White blood cell count was low at 4.2 but her H&H were normal.  CBC was otherwise reassuring.  Electrolytes were notable for bicarb of 15.  LFTs were also ordered and elevated.  Lipase was normal.  An ultrasound of the right upper quadrant was ordered with no evidence of acute cholecystitis.  Hepatomegaly with hepatic steatosis was noted.    The patient was treated with IV fluids and Reglan and morphine.  She continued to have emesis.  The plan was made to admit given her dehydration and persistent emesis.    5:47 PM - I discussed the case with Dr Pal, [facility] resident.  She agreed with the plan and accepted the patient.    Hydration: Based on the patient's presentation of Acute Vomiting the patient was given IV fluids. IV Hydration was used because oral hydration was not adequate alone. Upon recheck following hydration, the patient was improved.    ADDITIONAL PROBLEMS MANAGED  Generalized abdominal pain, tractable vomiting, diarrhea and dehydration, elevated LFTs    DISPOSITION AND DISCUSSIONS  I have discussed management of the patient with the  following physicians and ARIES's:  Dr Pal, [facility] family medicine    Discussion of management with other Q or appropriate source(s): None     FINAL IMPRESSION  1. Generalized abdominal pain    2. Intractable vomiting    3. Diarrhea, unspecified type    4. Dehydration    5. Elevated LFTs      -ADMIT-     Electronically signed by: Bhavana Cheek D.O., 8/11/2024 11:54 AM

## 2024-08-11 NOTE — ED TRIAGE NOTES
Chief Complaint   Patient presents with    Abdominal Pain    Vomiting    N/V    Diarrhea     32 y/o female with CC of viral gastritis, abd pain, +N/V/D x1week. Hx of gastroparesis with surgery June 2024. Pt took Kaopectate and Imodium at home without relief. Pt also reports small amounts of spotting blood in stool from internal hemorrhoid. -thinners. Pt is Aox4, ambulates with cane.

## 2024-08-12 LAB
ADV 40+41 DNA STL QL NAA+NON-PROBE: NOT DETECTED
ALBUMIN SERPL BCP-MCNC: 3.7 G/DL (ref 3.2–4.9)
ALBUMIN/GLOB SERPL: 2.3 G/DL
ALP SERPL-CCNC: 150 U/L (ref 30–99)
ALT SERPL-CCNC: 215 U/L (ref 2–50)
ANION GAP SERPL CALC-SCNC: 9 MMOL/L (ref 7–16)
AST SERPL-CCNC: 99 U/L (ref 12–45)
ASTRO TYP 1-8 RNA STL QL NAA+NON-PROBE: NOT DETECTED
BILIRUB SERPL-MCNC: 0.4 MG/DL (ref 0.1–1.5)
BUN SERPL-MCNC: 9 MG/DL (ref 8–22)
C CAYETANENSIS DNA STL QL NAA+NON-PROBE: NOT DETECTED
C COLI+JEJ+UPSA DNA STL QL NAA+NON-PROBE: NOT DETECTED
C DIFF DNA SPEC QL NAA+PROBE: NEGATIVE
C DIFF TOX GENS STL QL NAA+PROBE: NEGATIVE
CALCIUM ALBUM COR SERPL-MCNC: 8.5 MG/DL (ref 8.5–10.5)
CALCIUM SERPL-MCNC: 8.3 MG/DL (ref 8.5–10.5)
CHLORIDE SERPL-SCNC: 115 MMOL/L (ref 96–112)
CO2 SERPL-SCNC: 15 MMOL/L (ref 20–33)
CREAT SERPL-MCNC: 0.64 MG/DL (ref 0.5–1.4)
CRYPTOSP DNA STL QL NAA+NON-PROBE: NOT DETECTED
E HISTOLYT DNA STL QL NAA+NON-PROBE: NOT DETECTED
EAEC PAA PLAS AGGR+AATA ST NAA+NON-PRB: NOT DETECTED
EC STX1+STX2 GENES STL QL NAA+NON-PROBE: NOT DETECTED
EKG IMPRESSION: NORMAL
EPEC EAE GENE STL QL NAA+NON-PROBE: NOT DETECTED
ERYTHROCYTE [SEDIMENTATION RATE] IN BLOOD BY WESTERGREN METHOD: 11 MM/HOUR (ref 0–25)
ETEC LTA+ST1A+ST1B TOX ST NAA+NON-PROBE: NOT DETECTED
G LAMBLIA DNA STL QL NAA+NON-PROBE: NOT DETECTED
GFR SERPLBLD CREATININE-BSD FMLA CKD-EPI: 118 ML/MIN/1.73 M 2
GLOBULIN SER CALC-MCNC: 1.6 G/DL (ref 1.9–3.5)
GLUCOSE SERPL-MCNC: 126 MG/DL (ref 65–99)
HAV IGM SERPL QL IA: NORMAL
HBV CORE IGM SER QL: NORMAL
HBV SURFACE AG SER QL: NORMAL
HCV AB SER QL: NORMAL
LACTOFERRIN STL QL IA: POSITIVE
NOROVIRUS GI+II RNA STL QL NAA+NON-PROBE: NOT DETECTED
P SHIGELLOIDES DNA STL QL NAA+NON-PROBE: NOT DETECTED
POTASSIUM SERPL-SCNC: 4 MMOL/L (ref 3.6–5.5)
PROT SERPL-MCNC: 5.3 G/DL (ref 6–8.2)
RVA RNA STL QL NAA+NON-PROBE: NOT DETECTED
S ENT+BONG DNA STL QL NAA+NON-PROBE: NOT DETECTED
SAPO I+II+IV+V RNA STL QL NAA+NON-PROBE: NOT DETECTED
SHIGELLA SP+EIEC IPAH ST NAA+NON-PROBE: NOT DETECTED
SODIUM SERPL-SCNC: 139 MMOL/L (ref 135–145)
V CHOL+PARA+VUL DNA STL QL NAA+NON-PROBE: NOT DETECTED
V CHOLERAE DNA STL QL NAA+NON-PROBE: NOT DETECTED
Y ENTEROCOL DNA STL QL NAA+NON-PROBE: NOT DETECTED

## 2024-08-12 PROCEDURE — 93010 ELECTROCARDIOGRAM REPORT: CPT | Performed by: STUDENT IN AN ORGANIZED HEALTH CARE EDUCATION/TRAINING PROGRAM

## 2024-08-12 PROCEDURE — 86038 ANTINUCLEAR ANTIBODIES: CPT

## 2024-08-12 PROCEDURE — 86039 ANTINUCLEAR ANTIBODIES (ANA): CPT

## 2024-08-12 PROCEDURE — 80074 ACUTE HEPATITIS PANEL: CPT

## 2024-08-12 PROCEDURE — 700111 HCHG RX REV CODE 636 W/ 250 OVERRIDE (IP): Mod: JZ

## 2024-08-12 PROCEDURE — 700102 HCHG RX REV CODE 250 W/ 637 OVERRIDE(OP)

## 2024-08-12 PROCEDURE — 93005 ELECTROCARDIOGRAM TRACING: CPT

## 2024-08-12 PROCEDURE — 87493 C DIFF AMPLIFIED PROBE: CPT

## 2024-08-12 PROCEDURE — 87507 IADNA-DNA/RNA PROBE TQ 12-25: CPT

## 2024-08-12 PROCEDURE — 770006 HCHG ROOM/CARE - MED/SURG/GYN SEMI*

## 2024-08-12 PROCEDURE — 99232 SBSQ HOSP IP/OBS MODERATE 35: CPT | Mod: GC | Performed by: FAMILY MEDICINE

## 2024-08-12 PROCEDURE — 700105 HCHG RX REV CODE 258

## 2024-08-12 PROCEDURE — A9270 NON-COVERED ITEM OR SERVICE: HCPCS

## 2024-08-12 PROCEDURE — 85652 RBC SED RATE AUTOMATED: CPT

## 2024-08-12 PROCEDURE — 83993 ASSAY FOR CALPROTECTIN FECAL: CPT

## 2024-08-12 PROCEDURE — 86015 ACTIN ANTIBODY EACH: CPT

## 2024-08-12 PROCEDURE — 36415 COLL VENOUS BLD VENIPUNCTURE: CPT

## 2024-08-12 PROCEDURE — 80053 COMPREHEN METABOLIC PANEL: CPT

## 2024-08-12 PROCEDURE — 83630 LACTOFERRIN FECAL (QUAL): CPT

## 2024-08-12 RX ORDER — ALUMINA, MAGNESIA, AND SIMETHICONE 2400; 2400; 240 MG/30ML; MG/30ML; MG/30ML
10 SUSPENSION ORAL 4 TIMES DAILY PRN
Status: DISCONTINUED | OUTPATIENT
Start: 2024-08-12 | End: 2024-08-14 | Stop reason: HOSPADM

## 2024-08-12 RX ADMIN — TOPIRAMATE 50 MG: 25 TABLET, FILM COATED ORAL at 21:21

## 2024-08-12 RX ADMIN — SODIUM CHLORIDE: 9 INJECTION, SOLUTION INTRAVENOUS at 11:46

## 2024-08-12 RX ADMIN — SPIRONOLACTONE 25 MG: 25 TABLET ORAL at 06:42

## 2024-08-12 RX ADMIN — AMLODIPINE BESYLATE 5 MG: 5 TABLET ORAL at 06:42

## 2024-08-12 RX ADMIN — IVABRADINE 7.5 MG: 7.5 TABLET, FILM COATED ORAL at 18:19

## 2024-08-12 RX ADMIN — OMEPRAZOLE 20 MG: 20 CAPSULE, DELAYED RELEASE ORAL at 21:20

## 2024-08-12 RX ADMIN — IVABRADINE 7.5 MG: 7.5 TABLET, FILM COATED ORAL at 09:13

## 2024-08-12 RX ADMIN — LORAZEPAM 1 MG: 2 INJECTION INTRAMUSCULAR; INTRAVENOUS at 19:05

## 2024-08-12 RX ADMIN — GABAPENTIN 1200 MG: 400 CAPSULE ORAL at 04:19

## 2024-08-12 RX ADMIN — GABAPENTIN 1200 MG: 400 CAPSULE ORAL at 13:50

## 2024-08-12 RX ADMIN — LAMOTRIGINE 50 MG: 25 TABLET ORAL at 21:20

## 2024-08-12 RX ADMIN — ZIPRASIDONE HYDROCHLORIDE 40 MG: 40 CAPSULE ORAL at 21:20

## 2024-08-12 RX ADMIN — LAMOTRIGINE 50 MG: 25 TABLET ORAL at 09:14

## 2024-08-12 RX ADMIN — GABAPENTIN 1200 MG: 400 CAPSULE ORAL at 21:20

## 2024-08-12 RX ADMIN — METOPROLOL SUCCINATE 25 MG: 25 TABLET, EXTENDED RELEASE ORAL at 06:42

## 2024-08-12 RX ADMIN — LORAZEPAM 1 MG: 2 INJECTION INTRAMUSCULAR; INTRAVENOUS at 09:02

## 2024-08-12 RX ADMIN — ENOXAPARIN SODIUM 40 MG: 100 INJECTION SUBCUTANEOUS at 18:19

## 2024-08-12 RX ADMIN — Medication 10 MG: at 21:20

## 2024-08-12 RX ADMIN — OMEPRAZOLE 20 MG: 20 CAPSULE, DELAYED RELEASE ORAL at 09:14

## 2024-08-12 RX ADMIN — TOPIRAMATE 50 MG: 25 TABLET, FILM COATED ORAL at 09:13

## 2024-08-12 ASSESSMENT — PAIN DESCRIPTION - PAIN TYPE
TYPE: ACUTE PAIN

## 2024-08-12 NOTE — H&P
[facility]     PATIENT ID:  NAME:  Kristin Balderrama  MRN:               [id number]  YOB: 1990    Date of Admission: 8/11/2024     Attending: Ayaz Elizondo M.d.    Resident: Kae Larry M.D.    Primary Care Physician:  Dr. Torres Brody     CC:  n/v and abdominal pain   Chief Complaint   Patient presents with    Abdominal Pain    Vomiting    N/V    Diarrhea         HPI: Kristin Balderrama is a 33 y.o. female who presented with nausea, vomiting, diarrhea and abdominal pain for the last week. She states that today her emesis has worsened. She has not been able to tolerate food or water today.  She failed a PO challenge in the ED.    She reports that she has felt febrile but on the thermometer it has been Tmax 99.6 F. She reports chills. No sick contacts and no one else at home is sick with similar symptoms. She reports that she has tried imodium and cyopectate without relief. Her last episode of diarrhea was this morning. She reports hemorrhoids that are currently irritated and some bright blood with wiping yesterday.     Has history of gastroparesis s/p surgery 6/17/24. She reports that she did have infection after and gastric dumping the month following but has been fine since until today.     She has been taking 1000 mg tylenol daily and  ibuprofen 800 mg nightly for joint pain associated with Annetta-Danlos.     ER Course   Patient had labs collected with negative pregnancy test, CMP notable for bicarb of 15 and elevated liver enzymes AST of 63,  and alk phos 120.  He send CBC within normal limits.  Patient received 2 L of NS.  Upper quadrant ultrasound was performed with hepatomegaly and evidence of hepatic steatosis.  He was given morphine Reglan to help with symptoms.      REVIEW OF SYSTEMS:   Ten systems reviewed and were negative except as noted in the HPI.                PAST MEDICAL HISTORY:  Past Medical History:   Diagnosis Date    Abdominal pain      "Anginal syndrome     random chest pain especially with tachycardia    Apnea, sleep     Arrhythmia     \"sinus tachycardia\", cariologist, Dr. Kumar; ablation 2/2016    Arthritis     osteo    ASTHMA     when around smoke    Back pain     Borderline personality disorder (HCC)     Breath shortness     with tachycardia    Bronchitis 02/08/2022    Last time was 12/21    Cardiac arrhythmia     Chickenpox     Chronic UTI 09/18/2010    Cough     Daytime sleepiness     Dental disorder 03/08/2021    infected tooth    Depression     Diabetes (HCC)     Diarrhea     incontinece    Disorder of thyroid     Hashimoto's    Fall     Fatigue     Frequent headaches     Gasping for breath     Gynecological disorder     PCOS    Headache(784.0)     Heart burn     Heart murmur     History of falling     Indigestion     Migraine     Mitochondrial disease (HCC)     Multiple personality disorder (HCC)     Nausea     Obesity     Other fatigue 06/29/2020    Pain 08/15/2012    back, 7/10    Painful joint     PCOS (polycystic ovarian syndrome)     Pneumonia 2012 12/2020    POTS (postural orthostatic tachycardia syndrome)     Psychosis (HCC)     Ringing in ears     Scoliosis     Shortness of breath     O2 as needed    Sinus tachycardia 10/31/2013    Sleep apnea     CPAP \"pulmonary doctor took me off mid year 2016\"    Snoring     Supraventricular tachycardia (HCC) 4/10/2019    Tonsillitis     Transverse myelitis (HCC)     2/8/22: Per pt: not anymore    Tuberculosis     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    Urinary bladder disorder     Suprapubic cath. 2/8/22: Not anymore.     Urinary incontinence     Weakness     Wears glasses        PAST SURGICAL HISTORY:  Past Surgical History:   Procedure Laterality Date    HI CYSTOSCOPY,INSERT URETERAL STENT Right 2/12/2024    Procedure: CYSTOSCOPY, WITH RIGHT URETEROSCOPY, WITH LITHOTRIPSY, WITH INSERTION OF RIGHT URETERAL STENT;  Surgeon: Josafat Roberson M.D.;  Location: SURGERY [facility];  Service: " Urology    TX CYSTO/URETERO/PYELOSCOPY, DX Right 2/12/2024    Procedure: URETEROSCOPY;  Surgeon: Josafat Roberson M.D.;  Location: [facility];  Service: Urology    LASERTRIPSY Right 2/12/2024    Procedure: LITHOTRIPSY, USING LASER;  Surgeon: Josafat Roberson M.D.;  Location: [facility];  Service: Urology    INGUINAL HERNIA LAPAROSCOPIC Right 07/21/2023    Procedure: LAPAROSCOPIC RIGHT INGUINAL HERNIA REPAIR WITH MESH;  Surgeon: Joe Noyola M.D.;  Location: [facility];  Service: General    HERNIA REPAIR Right 07/21/2023    PB PERCUT FIX PBOX/NECK FEMUR FX Left 01/28/2023    Procedure: FIXATION, HIP, USING CANNULATED SCREW;  Surgeon: Noman Abdul M.D.;  Location: [facility];  Service: Orthopedics    TX LAP,DIAGNOSTIC ABDOMEN  02/14/2022    Procedure: LAPAROSCOPY;  Surgeon: Seamus Pisano M.D.;  Location: SURGERY SAME DAY [facility];  Service: Gynecology    OVARIAN CYSTECTOMY Right 02/14/2022    Procedure: EXCISION, CYST, OVARY;  Surgeon: Seamus Pisano M.D.;  Location: SURGERY SAME DAY [facility];  Service: Gynecology    BOWEL STIMULATOR INSERTION  03/10/2021    Procedure: INSERTION, ELECTRODE LEADS AND PULSE GENERATOR, NEUROSTIMULATOR, SACRAL - REMOVAL OF INTERSTIM WITH REPLACEMENT OF SACRAL NEUROMODULATION DEVICE;  Surgeon: Joe Noyola M.D.;  Location: [facility];  Service: General    MUSCLE BIOPSY Right 01/26/2017    Procedure: MUSCLE BIOPSY - THIGH;  Surgeon: Isidro Vigil M.D.;  Location: [facility];  Service:     GASTROSCOPY WITH BALLOON DILATATION N/A 01/04/2017    Procedure: GASTROSCOPY WITH DILATATION;  Surgeon: Torres Vargas M.D.;  Location: [facility];  Service:     BOWEL STIMULATOR INSERTION  07/13/2016    Procedure: BOWEL STIMULATOR INSERTION FOR PERMANENT INTERSTIM SACRAL IMPLANT;  Surgeon: Joe Noyola M.D.;  Location: [facility];  Service:     RECOVERY  01/27/2016    Procedure: CATH LAB EP STUDY WITH SINUS  "NODE MODIFICATION ABHINAV;  Surgeon: [facility];  [place] [facility] [facility]  Service:     OTHER CARDIAC SURGERY  01/2016    cardiac ablation    NEURO DEST FACET L/S W/IG SNGL  04/21/2015    Performed by Reza Tabor at SURGERY [facility]    LUMBAR FUSION ANTERIOR  08/21/2012    Performed by SUSIE SAWANT at SURGERY [facility]    ALYSSA BY LAPAROSCOPY  08/29/2010    Performed by SHAYY JOHNS at SURGERY [facility]    LAMINOTOMY      OTHER ABDOMINAL SURGERY      TONSILLECTOMY      tonsillectomy       FAMILY HISTORY:  Family History   Problem Relation Age of Onset    Hypertension Mother     Sleep Apnea Mother     Heart Disease Mother     Other Mother         hypothryod    Hypertension Maternal Uncle     Heart Disease Maternal Grandmother     Hypertension Maternal Grandmother     No Known Problems Sister     Other Sister         Narcolepsy;fibromyalsia;bone;nerve    Genitourinary () Problems Sister         endometriosis       SOCIAL HISTORY:   Pt lives in [place].   Smoking Denies   Etoh use Denies   Drug use Denies    DIET:   No orders of the defined types were placed in this encounter.      ALLERGIES:  Allergies   Allergen Reactions    Cefdinir Shortness of Breath and Itching     Tolerated 1/18/17  Tolerates ceftriaxone  Tolerated augmentin 8/2019     Depakote [Divalproex Sodium] Unspecified     Muscle spasms/muscle pain and weakness      Depakote [Divalproex Sodium]      Muscle spasms/muscle pain and weakness    Doxycycline Anaphylaxis and Vomiting     Other reaction(s): pustules/blisters  Other reaction(s): stomach pain    Montelukast      Cardiac effusion    Montelukast [Singulair] Unspecified     Cardiac effusion    Vancomycin Itching     Pt becomes flushed in face and chest.   RXN=7/10/16    Wound Dressing Adhesive Rash     By pt report-\"removes skin totally off\"    Amitriptyline Unspecified     Headaches      Amoxicillin Rash          Aripiprazole Unspecified     " "Headaches/muscle twitching      Aripiprazole [Abilify] Unspecified     Headaches/muscle twitching      Clindamycin Nausea         Other reaction(s): nausea stomach pain    Erythromycin Rash     .  Other reaction(s): nausea stomach pain    Flomax [Tamsulosin Hydrochloride] Swelling    Hydromorphone      Other reaction(s): vomiting    Levaquin Unspecified     Severe muscle cramps in legs  RXN=unknown  Other reaction(s): leg muscle cramps    Metformin Unspecified     Increased lactic acid     Other reaction(s): itching and rash/nausea vomiting    Sulfa Drugs Hives, Itching, Myalgia and Unspecified     Muscle pain and weakness    Other reaction(s): unknown    Tamsulosin Swelling     Swelling of legs    Tape Rash     Tears skin off  coban with Tegaderm tape ok intermittently  RXN=ongoing    Sulfamethoxazole W-Trimethoprim Rash    Ampicillin Rash     Pt reports that she received a rash     Ciprofloxacin Rash          Keflex Rash     Pt states she gets a rash with this medication  Tolerates ceftriaxone  Can take with Benadryl    Levofloxacin Unspecified     Leg muscle cramps    Metronidazole Rash     \"Vision problems\"  Other reaction(s): vision problems    Penicillins Hives     Can take with Benadryl    Valproic Acid Rash     .       OUTPATIENT MEDICATIONS:    Current Facility-Administered Medications:     NS infusion, , Intravenous, Continuous, Kae Larry M.D., Last Rate: 150 mL/hr at 08/11/24 1948, New Bag at 08/11/24 1948    enoxaparin (Lovenox) inj 40 mg, 40 mg, Subcutaneous, DAILY AT 1800, Kae Larry M.D., 40 mg at 08/11/24 1947    LORazepam (Ativan) injection 1 mg, 1 mg, Intravenous, Q4HRS PRN, Kae Larry M.D.    [START ON 8/12/2024] amLODIPine (Norvasc) tablet 5 mg, 5 mg, Oral, DAILY, Kae Larry M.D.    gabapentin (Neurontin) capsule 1,200 mg, 1,200 mg, Oral, TID, Kae Larry M.D., 1,200 mg at 08/11/24 2132    lamoTRIgine (LaMICtal) tablet 50 mg, 50 mg, Oral, BID, Kae Larry M.D., " 50 mg at 08/11/24 2133    melatonin tablet 10 mg, 10 mg, Oral, QHS, Kae Larry M.D., 10 mg at 08/11/24 2131    [START ON 8/12/2024] metoprolol SR (Toprol XL) tablet 25 mg, 25 mg, Oral, DAILY, Kae Larry M.D.    omeprazole (PriLOSEC) capsule 20 mg, 20 mg, Oral, BID, Kae Larry M.D., 20 mg at 08/11/24 2132    [START ON 8/12/2024] spironolactone (Aldactone) tablet 25 mg, 25 mg, Oral, DAILY, Kae Larry M.D.    topiramate (Topamax) tablet 50 mg, 50 mg, Oral, BID, Kae Larry M.D., 50 mg at 08/11/24 2133    ziprasidone (Geodon) capsule 40 mg, 40 mg, Oral, QHS, Kae Larry M.D., 40 mg at 08/11/24 2132    Current Outpatient Medications:     diphenhydrAMINE (BENADRYL) 25 MG Tab, Take 25-50 mg by mouth 2 times a day. Scheduled  25 mg = AM 50 MG = PM, Disp: , Rfl:     Melatonin 10 MG Tab, Take 10 mg by mouth at bedtime., Disp: , Rfl:     sumatriptan (IMITREX) 5 MG/ACT nasal spray, Administer 1 Spray into affected nostril(S) as needed. Indications: Migraine Headache, Disp: , Rfl:     topiramate (TOPAMAX) 50 MG tablet, Take 50 mg by mouth 2 times a day., Disp: , Rfl:     metoprolol SR (TOPROL XL) 25 MG TABLET SR 24 HR, TAKE 1 TABLET BY MOUTH EVERY DAY, Disp: 90 Tablet, Rfl: 3    spironolactone (ALDACTONE) 25 MG Tab, 25 mg every day., Disp: , Rfl:     ibuprofen (MOTRIN) 800 MG Tab, Take 1 Tablet by mouth every 8 hours as needed for Mild Pain., Disp: 30 Tablet, Rfl: 0    loperamide (IMODIUM) 2 MG Cap, Take 1 Capsule by mouth 4 times a day as needed for Diarrhea., Disp: 30 Capsule, Rfl: 0    ondansetron (ZOFRAN ODT) 4 MG TABLET DISPERSIBLE, Take 1 Tablet by mouth every 6 hours as needed for Nausea/Vomiting., Disp: 10 Tablet, Rfl: 0    ivabradine (CORLANOR) 7.5 MG Tab tablet, Take 1 Tablet by mouth 2 times a day with meals., Disp: 60 Tablet, Rfl: 11    amLODIPine (NORVASC) 5 MG Tab, Take 5 mg by mouth every day., Disp: , Rfl:     Rimegepant Sulfate (NURTEC) 75 MG TABLET DISPERSIBLE, Take 75 mg by  "mouth 1 time a day as needed (migraine)., Disp: , Rfl:     gabapentin (NEURONTIN) 400 MG Cap, Take 1,200 mg by mouth 3 times a day. 3 capsules=1200mg, Disp: , Rfl:     omeprazole (PRILOSEC) 20 MG delayed-release capsule, Take 20 mg by mouth 2 times a day., Disp: , Rfl:     lamoTRIgine (LAMICTAL) 25 MG Tab, Take 50 mg by mouth 2 times a day. 25 mg x 2 tablets = 50 mg, Disp: , Rfl:     ziprasidone (GEODON) 40 MG Cap, Take 1 capsule by mouth at bedtime., Disp: 30 Capsule, Rfl: 2    etonogestrel (NEXPLANON) 68 MG Implant implant, Inject 68 mg under the skin see administration instructions. Implant in left arm Every 3 years to change, thinks it was placed , Disp: , Rfl:     adalimumab (HUMIRA) 80 MG/0.8ML injection, Inject 80 mg under the skin every 14 days. Last injection was on 3/14/2024, Disp: , Rfl:     Galcanezumab-gnlm (EMGALITY) 120 MG/ML Solution Auto-injector, Inject 120 mg under the skin every 30 (thirty) days. On the  of every month, last dose was taken on 2024, Disp: , Rfl:     PHYSICAL EXAM:  Vitals:    24 1028 24 1046 24 1200 24   BP: (!) 141/87  133/68 109/51   Pulse: 78  74 69   Resp: 16  15 16   Temp: 36.4 °C (97.5 °F)      TempSrc: Temporal      SpO2: 96%  97% 96%   Weight:  118 kg (259 lb 7.7 oz)     Height:  1.626 m (5' 4\")     , Temp (24hrs), Av.4 °C (97.5 °F), Min:36.4 °C (97.5 °F), Max:36.4 °C (97.5 °F)  , Pulse Oximetry: 96 %, O2 (LPM): 0, O2 Delivery Device: None - Room Air    Physical Exam  Constitutional:       General: She is not in acute distress.     Appearance: Normal appearance. She is obese.   HENT:      Head: Normocephalic.      Mouth/Throat:      Mouth: Mucous membranes are dry.      Pharynx: Oropharynx is clear.   Eyes:      Extraocular Movements: Extraocular movements intact.      Conjunctiva/sclera: Conjunctivae normal.      Pupils: Pupils are equal, round, and reactive to light.   Cardiovascular:      Rate and Rhythm: Normal rate and " regular rhythm.      Heart sounds: No murmur heard.  Pulmonary:      Effort: Pulmonary effort is normal.      Breath sounds: Normal breath sounds.   Abdominal:      General: Abdomen is flat. Bowel sounds are normal.      Palpations: Abdomen is soft.      Tenderness: There is generalized abdominal tenderness. There is no right CVA tenderness, left CVA tenderness, guarding or rebound.   Musculoskeletal:         General: Normal range of motion.      Cervical back: Normal range of motion.   Skin:     General: Skin is warm.   Neurological:      General: No focal deficit present.      Mental Status: She is alert and oriented to person, place, and time.   Psychiatric:         Mood and Affect: Mood normal.       LAB TESTS:   Recent Labs     08/11/24  1201   WBC 4.2*   RBC 4.48   HEMOGLOBIN 14.2   HEMATOCRIT 40.9   MCV 91.3   MCH 31.7   RDW 41.4   PLATELETCT 172   MPV 11.0   NEUTSPOLYS 46.60   LYMPHOCYTES 35.30   MONOCYTES 10.30   EOSINOPHILS 7.80*   BASOPHILS 0.00   RBCMORPHOLO Present         Recent Labs     08/11/24  1201   SODIUM 138   POTASSIUM 4.2   CHLORIDE 109   CO2 15*   BUN 13   CREATININE 0.81   CALCIUM 9.4   ALBUMIN 4.4       CULTURES:   Results       Procedure Component Value Units Date/Time    URINALYSIS,CULTURE IF INDICATED [[id number]]     Order Status: Sent Specimen: Urine             IMAGES:  US-RUQ   Final Result      1.  Hepatomegaly with evidence of hepatic steatosis          CONSULTS:   None     ASSESSMENT/PLAN: 33 y.o. female admitted for dehydration likely 2/2 to viral gastroenteritis.    * Abdominal pain with vomiting- (present on admission)  Assessment & Plan  #Nausea   #Emesis   #Diarrhea     Likely 2/2 to viral gastroenteritis given the duration of symptoms and physical exam findings.     ml/hr   Given Qtc prolongation on prior EKG will give ativan 1 mg q4h prn and avoid zofran, promethazine and compazine   Advance diet as tolerated   Restart home omeprazole     Metabolic acidosis, NAG,  bicarbonate losses  Assessment & Plan  2/2 to fluid loss from emesis and diarrhea.     Replete fluids   CMP in AM     Elevated liver enzymes  Assessment & Plan  Likely 2/2 to viral gastroenteritis and emesis.    CMP in AM     Peripheral neuropathy and chronic pain syndrome (CMS-HCC)- (present on admission)  Assessment & Plan  Patient with joint pain and neuropathy.     Restart home gabapentin       Reordered her home medications for her chronic problems     Dispo: Inpatient management for IV hydration.    Plan discussed with [facility] attending Dr. Simmons     Core Measures:  Fluids: NS at 150 ml/hr   Lines: PIV  Abx: None  Diet: Advance as tolerated   PPX: Lovenox       CODE STATUS: DNR/DNI       Kae Larry MD  PGY2  [facility]

## 2024-08-12 NOTE — ASSESSMENT & PLAN NOTE
Likely 2/2 to viral gastroenteritis and emesis. Has known history of fatty liver disease as seen on RUQ US, recommend outpatient follow-up. Yesterday CMP shows continued rise in transaminases, today improving. Negative for Hepatitis A, B, C. Normal ESR. Concern for possible autoimmune hepatitis vs LOO    -trend CMP on admission  -pending AI, ASMA  -consider referral to [facility] [place], LOO study on discharge

## 2024-08-12 NOTE — DIETARY
NUTRITION SERVICES: BMI - Pt with BMI >40 (=Body mass index is 44.65 kg/m².), Class III obesity. Weight loss counseling not appropriate in acute care setting. RECOMMEND - If appropriate at DC please refer to outpatient nutrition services for weight management.

## 2024-08-12 NOTE — ED NOTES
Bedside report received from off going RN: Maria Dolores. Assumed care of patient.  POC discussed with patient. Call light within reach, all needs addressed at this time.       Fall risk interventions in place: Move the patient closer to the nurse's station, Patient's personal possessions are with in their safe reach, Place socks on patient, Place fall risk sign on patient's door, Keep floor surfaces clean and dry, and Accompanied to restroom   Continuous monitoring: Pulse Ox or Blood Pressure  IVF/IV medications: NS infusing per MAR  Oxygen: Room Air  Bedside sitter: Not Applicable   Isolation: Not Applicable

## 2024-08-12 NOTE — PROGRESS NOTES
4 Eyes Skin Assessment Completed by SONYA Stover and SONYA Weiss.    Head WDL  Ears WDL  Nose WDL  Mouth WDL  Neck WDL  Breast/Chest WDL  Shoulder Blades WDL  Spine WDL  (R) Arm/Elbow/Hand WDL  (L) Arm/Elbow/Hand WDL  Abdomen Scar, Scab, and Bruising  Groin Scar  Scrotum/Coccyx/Buttocks WDL  (R) Leg WDL  (L) Leg WDL  (R) Heel/Foot/Toe dry, cracked  (L) Heel/Foot/Toe dry, cracked          Devices In Places glasses      Interventions In Place Pillows, Dri-James Pads, and Pressure Redistribution Mattress    Possible Skin Injury No    Pictures Uploaded Into Epic N/A  Wound Consult Placed N/A  RN Wound Prevention Protocol Ordered No

## 2024-08-12 NOTE — ASSESSMENT & PLAN NOTE
#Nausea #Emesis #Diarrhea   Gasteroenteritis of unknown etiology, Gastrointestinal PCR panel negative, however viral still likely given the duration of symptoms and physical exam findings.   Patient reported to have had a colonoscopy in the past with findings significant for precancerous polyps and internal hemorrhoid. Fecal lactoferrin positive, however ESR normal, Low likelihood IBD  History significant for previous C diff infection, negative on this admission.  she has been on Humira for the last year with a suppressed WBC of 4.2.     - LR fluids  - Ativan PRN for severe nausea given Qtc prolongation, avoid zofran, promethazine and compazine  - Advance diet as tolerated, encourage to take small sips and wait a minute between sips   - Pantoprazole instead of home omeprazole while inpatient  - Pending stool culture, O&P, fecal calprotectin  - Low liklihood IBD, can continues no improvement

## 2024-08-12 NOTE — PROGRESS NOTES
Attending:   Dr. Elizondo    Resident:   Alexandria Pitts D.O.    PATIENT:   Kristin Balderrama; [id number]; 1990    ID:   33 y.o. female with extensive PMHx who presented with nausea, vomiting, diarrhea and abdominal pain for the last week was admitted due to failing PO challenge in ED.     SUBJECTIVE:   She had not had vomiting since receiving Ativan in the ED however is feeling nauseas. After a clear liquids breakfast she stated she immediately regurgitated the liquids. She received another dose of ativan and was able to keep some liquids down.   Since being in the hospital she has not had a BM, but feels abdominal cramping.   Her previous bowel movements were watery, described as oil on water appearing.     We are unable to see records, per patient she had a colonoscopy with [facility] and was noted to have precancerous polyps. At the time she was having profound bleeding per rectum and states that the current blood on wiping is far less and attributes it to an internal hemorrhoid.     She reports having sharp joint pain throughout her body and edema of her hands and feet. The pain is different than her fibromyalgia and she endorses increased weakness.     Hospital Course:  8/11: admitted as failed PO challenge in ED  8/12:       OBJECTIVE:  Vitals:    08/11/24 2224 08/11/24 2256 08/12/24 0421 08/12/24 0744   BP: (!) 153/76  113/59 136/69   Pulse: 73  74 72   Resp: 18  16 15   Temp: 36 °C (96.8 °F)  36.1 °C (97 °F) 36 °C (96.8 °F)   TempSrc: Temporal  Temporal Temporal   SpO2: 97%  95% 96%   Weight:  118 kg (260 lb 2.3 oz)     Height:           Intake/Output Summary (Last 24 hours) at 8/12/2024 1210  Last data filed at 8/11/2024 2346  Gross per 24 hour   Intake 1240 ml   Output --   Net 1240 ml       PHYSICAL EXAM:  General: No acute distress, afebrile, resting comfortably  HEENT: NC/AT. EOMI.   Cardiovascular: RRR without murmurs. Normal capillary refill   Respiratory:  "CTAB  Abdomen: soft, nondistended, no masses, generalized tenderness to palpation   EXT:  NEWTON, no edema  Skin: No erythema/lesions   Neuro: Non-focal    LABS:  Recent Labs     08/11/24  1201   WBC 4.2*   RBC 4.48   HEMOGLOBIN 14.2   HEMATOCRIT 40.9   MCV 91.3   MCH 31.7   RDW 41.4   PLATELETCT 172   MPV 11.0   NEUTSPOLYS 46.60   LYMPHOCYTES 35.30   MONOCYTES 10.30   EOSINOPHILS 7.80*   BASOPHILS 0.00   RBCMORPHOLO Present     Recent Labs     08/11/24  1201 08/12/24  0654   SODIUM 138 139   POTASSIUM 4.2 4.0   CHLORIDE 109 115*   CO2 15* 15*   BUN 13 9   CREATININE 0.81 0.64   CALCIUM 9.4 8.3*   ALBUMIN 4.4 3.7     Estimated GFR/CRCL = Estimated Creatinine Clearance: 156.4 mL/min (by C-G formula based on SCr of 0.64 mg/dL).  Recent Labs     08/11/24  1201 08/12/24  0654   GLUCOSE 145* 126*     Recent Labs     08/11/24  1201 08/12/24  0654   ASTSGOT 63* 99*   ALTSGPT 139* 215*   TBILIRUBIN 0.4 0.4   ALKPHOSPHAT 120* 150*   GLOBULIN 2.0 1.6*             No results for input(s): \"INR\", \"APTT\", \"FIBRINOGEN\" in the last 72 hours.    Invalid input(s): \"DIMER\"      IMAGING:  US-RUQ   Final Result      1.  Hepatomegaly with evidence of hepatic steatosis          MEDS:  Current Facility-Administered Medications   Medication Last Admin    Pharmacy Consult Request - C. difficile consult      mag hydrox-al hydrox-simeth (Maalox Plus Es Or Mylanta Ds) suspension 10 mL      NS infusion New Bag at 08/12/24 1146    enoxaparin (Lovenox) inj 40 mg 40 mg at 08/11/24 1947    LORazepam (Ativan) injection 1 mg 1 mg at 08/12/24 0902    amLODIPine (Norvasc) tablet 5 mg 5 mg at 08/12/24 0642    gabapentin (Neurontin) capsule 1,200 mg 1,200 mg at 08/12/24 0419    lamoTRIgine (LaMICtal) tablet 50 mg 50 mg at 08/12/24 0914    melatonin tablet 10 mg 10 mg at 08/11/24 2131    metoprolol SR (Toprol XL) tablet 25 mg 25 mg at 08/12/24 0642    omeprazole (PriLOSEC) capsule 20 mg 20 mg at 08/12/24 0914    spironolactone (Aldactone) tablet 25 mg 25 mg " at 08/12/24 0642    topiramate (Topamax) tablet 50 mg 50 mg at 08/12/24 0913    ziprasidone (Geodon) capsule 40 mg 40 mg at 08/11/24 2132    ivabradine (Corlanor) tablet 7.5 mg 7.5 mg at 08/12/24 0913       ASSESSMENT/PLAN:    Problem   Abdominal Pain With Vomiting   Elevated Liver Enzymes       * Abdominal pain with vomiting- (present on admission)  Assessment & Plan  #Nausea #Emesis #Diarrhea   Likely 2/2 to viral gastroenteritis given the duration of symptoms and physical exam findings.  Patient reported to have had a colonoscopy in the past with findings significant for precancerous polyps and internal hemorrhoid.   History significant for previous C diff infection, she has been on Humira for the last year with a suppressed WBC of 4.2. Unlikely, but possible concern for C diff infection vs parasitic infection vs IBD vs bacterial infection.        ml/hr   Given Qtc prolongation on prior EKG will give ativan 1 mg q4h prn and avoid zofran, promethazine and compazine   Advance diet as tolerated   Restart home omeprazole   Pending: Stool culture, Fecal lactoferrin, fecal calprotectin, C diff by PCR, Complete O&P, Gastrointestinal panel by PCR    Metabolic acidosis, NAG, bicarbonate losses  Assessment & Plan  2/2 to fluid loss from emesis and diarrhea.     Replete fluids   CMP in AM     Elevated liver enzymes  Assessment & Plan  Likely 2/2 to viral gastroenteritis and emesis. Has known history of fatty liver disease as seen on RUQ US, recommend outpatient follow-up. AM CMP shows continued rise in transaminases. Concern for possible autoimmune hepatitis vs LOO    Repeat CMP in AM   Pending: ESR, AI, Anti smooth muscle abs, hepatitis panel acute  Can consider referral to [facility], LOO testing     Peripheral neuropathy and chronic pain syndrome (CMS-HCC)- (present on admission)  Assessment & Plan  Patient with joint pain and neuropathy.     Restart home gabapentin          Core Measures:  Fluids:  NS  Lines: PIV  Abx: none  Diet: Advance as tolerated  PPX: Lovenox    DISPO: inpatient pending ability to eat      CODE STATUS: DNR/DNI    Alexandria Pitts D.O.  PGY-1  [facility]

## 2024-08-12 NOTE — PROGRESS NOTES
Pt tolerating clear liquid diet, advanced to full liquid for dinner.     Pt with small BM this afternoon - C.Diff stool sample sent. Will need further stool for full GI panel to be completed. Pt aware.    Episode of emesis while eating dinner, IV Ativan given. IVF continue to infuse per MAR order.

## 2024-08-12 NOTE — ED NOTES
Med rec updated and complete. Allergies reviewed.   Pt confirmed name and date of  birth.    Pt denies antibiotic use in last 30 days.    No anticoagulant medications.      Home pharmacy  [facility] =[phone]

## 2024-08-12 NOTE — CARE PLAN
The patient is Stable - Low risk of patient condition declining or worsening    Shift Goals  Clinical Goals: decreased n/v, maintain safety  Patient Goals: sleep, decreased n/v, pain control    Progress made toward(s) clinical / shift goals:        Problem: Pain - Standard  Goal: Alleviation of pain or a reduction in pain to the patient’s comfort goal  Outcome: Progressing     Problem: Knowledge Deficit - Standard  Goal: Patient and family/care givers will demonstrate understanding of plan of care, disease process/condition, diagnostic tests and medications  Outcome: Progressing     Problem: Fall Risk  Goal: Patient will remain free from falls  Outcome: Progressing       Patient is not progressing towards the following goals:

## 2024-08-12 NOTE — ED NOTES
Report to SONYA Weiss. Transport at bedside.  Pt home Corlanor approved by pharmacy and sent with transport with pt to [facility]. SONYA Weiss aware of pt at home medication continuing to floor for inpatient use.

## 2024-08-13 LAB
ALBUMIN SERPL BCP-MCNC: 4 G/DL (ref 3.2–4.9)
ALBUMIN/GLOB SERPL: 2.4 G/DL
ALP SERPL-CCNC: 124 U/L (ref 30–99)
ALT SERPL-CCNC: 151 U/L (ref 2–50)
ANION GAP SERPL CALC-SCNC: 11 MMOL/L (ref 7–16)
ANISOCYTOSIS BLD QL SMEAR: ABNORMAL
AST SERPL-CCNC: 36 U/L (ref 12–45)
BASOPHILS # BLD AUTO: 1.7 % (ref 0–1.8)
BASOPHILS # BLD: 0.07 K/UL (ref 0–0.12)
BILIRUB SERPL-MCNC: 0.3 MG/DL (ref 0.1–1.5)
BUN SERPL-MCNC: 5 MG/DL (ref 8–22)
CALCIUM ALBUM COR SERPL-MCNC: 8.9 MG/DL (ref 8.5–10.5)
CALCIUM SERPL-MCNC: 8.9 MG/DL (ref 8.5–10.5)
CHLORIDE SERPL-SCNC: 112 MMOL/L (ref 96–112)
CO2 SERPL-SCNC: 15 MMOL/L (ref 20–33)
COMMENT NL1176: NORMAL
CREAT SERPL-MCNC: 0.66 MG/DL (ref 0.5–1.4)
EOSINOPHIL # BLD AUTO: 0.04 K/UL (ref 0–0.51)
EOSINOPHIL NFR BLD: 0.9 % (ref 0–6.9)
ERYTHROCYTE [DISTWIDTH] IN BLOOD BY AUTOMATED COUNT: 44.1 FL (ref 35.9–50)
GFR SERPLBLD CREATININE-BSD FMLA CKD-EPI: 117 ML/MIN/1.73 M 2
GLOBULIN SER CALC-MCNC: 1.7 G/DL (ref 1.9–3.5)
GLUCOSE SERPL-MCNC: 122 MG/DL (ref 65–99)
HCT VFR BLD AUTO: 37.9 % (ref 37–47)
HGB BLD-MCNC: 12.8 G/DL (ref 12–16)
LYMPHOCYTES # BLD AUTO: 1.46 K/UL (ref 1–4.8)
LYMPHOCYTES NFR BLD: 36.5 % (ref 22–41)
MANUAL DIFF BLD: NORMAL
MCH RBC QN AUTO: 31.4 PG (ref 27–33)
MCHC RBC AUTO-ENTMCNC: 33.8 G/DL (ref 32.2–35.5)
MCV RBC AUTO: 92.9 FL (ref 81.4–97.8)
MICROCYTES BLD QL SMEAR: ABNORMAL
MONOCYTES # BLD AUTO: 0.21 K/UL (ref 0–0.85)
MONOCYTES NFR BLD AUTO: 5.2 % (ref 0–13.4)
MORPHOLOGY BLD-IMP: NORMAL
NEUTROPHILS # BLD AUTO: 2.23 K/UL (ref 1.82–7.42)
NEUTROPHILS NFR BLD: 55.7 % (ref 44–72)
NRBC # BLD AUTO: 0 K/UL
NRBC BLD-RTO: 0 /100 WBC (ref 0–0.2)
NUCLEAR IGG SER QL IA: DETECTED
PLATELET # BLD AUTO: 152 K/UL (ref 164–446)
PLATELET BLD QL SMEAR: NORMAL
PMV BLD AUTO: 11.5 FL (ref 9–12.9)
POTASSIUM SERPL-SCNC: 4.1 MMOL/L (ref 3.6–5.5)
PROT SERPL-MCNC: 5.7 G/DL (ref 6–8.2)
RBC # BLD AUTO: 4.08 M/UL (ref 4.2–5.4)
RBC BLD AUTO: PRESENT
SODIUM SERPL-SCNC: 138 MMOL/L (ref 135–145)
VARIANT LYMPHS BLD QL SMEAR: NORMAL
WBC # BLD AUTO: 4 K/UL (ref 4.8–10.8)

## 2024-08-13 PROCEDURE — 700101 HCHG RX REV CODE 250

## 2024-08-13 PROCEDURE — 700102 HCHG RX REV CODE 250 W/ 637 OVERRIDE(OP)

## 2024-08-13 PROCEDURE — 36415 COLL VENOUS BLD VENIPUNCTURE: CPT

## 2024-08-13 PROCEDURE — 94640 AIRWAY INHALATION TREATMENT: CPT

## 2024-08-13 PROCEDURE — 770006 HCHG ROOM/CARE - MED/SURG/GYN SEMI*

## 2024-08-13 PROCEDURE — 94760 N-INVAS EAR/PLS OXIMETRY 1: CPT

## 2024-08-13 PROCEDURE — 700111 HCHG RX REV CODE 636 W/ 250 OVERRIDE (IP)

## 2024-08-13 PROCEDURE — 85007 BL SMEAR W/DIFF WBC COUNT: CPT

## 2024-08-13 PROCEDURE — 99232 SBSQ HOSP IP/OBS MODERATE 35: CPT | Mod: GC | Performed by: FAMILY MEDICINE

## 2024-08-13 PROCEDURE — 80053 COMPREHEN METABOLIC PANEL: CPT

## 2024-08-13 PROCEDURE — A9270 NON-COVERED ITEM OR SERVICE: HCPCS

## 2024-08-13 PROCEDURE — 700111 HCHG RX REV CODE 636 W/ 250 OVERRIDE (IP): Mod: JZ

## 2024-08-13 PROCEDURE — 85027 COMPLETE CBC AUTOMATED: CPT

## 2024-08-13 PROCEDURE — 700105 HCHG RX REV CODE 258

## 2024-08-13 RX ORDER — PANTOPRAZOLE SODIUM 40 MG/10ML
40 INJECTION, POWDER, LYOPHILIZED, FOR SOLUTION INTRAVENOUS DAILY
Status: DISCONTINUED | OUTPATIENT
Start: 2024-08-13 | End: 2024-08-14 | Stop reason: HOSPADM

## 2024-08-13 RX ORDER — IPRATROPIUM BROMIDE AND ALBUTEROL SULFATE 2.5; .5 MG/3ML; MG/3ML
3 SOLUTION RESPIRATORY (INHALATION)
Status: DISCONTINUED | OUTPATIENT
Start: 2024-08-13 | End: 2024-08-14 | Stop reason: HOSPADM

## 2024-08-13 RX ORDER — IPRATROPIUM BROMIDE AND ALBUTEROL SULFATE 2.5; .5 MG/3ML; MG/3ML
3 SOLUTION RESPIRATORY (INHALATION) ONCE
Status: DISCONTINUED | OUTPATIENT
Start: 2024-08-13 | End: 2024-08-13

## 2024-08-13 RX ORDER — SODIUM CHLORIDE, SODIUM LACTATE, POTASSIUM CHLORIDE, CALCIUM CHLORIDE 600; 310; 30; 20 MG/100ML; MG/100ML; MG/100ML; MG/100ML
INJECTION, SOLUTION INTRAVENOUS CONTINUOUS
Status: DISCONTINUED | OUTPATIENT
Start: 2024-08-13 | End: 2024-08-13

## 2024-08-13 RX ADMIN — SPIRONOLACTONE 25 MG: 25 TABLET ORAL at 04:47

## 2024-08-13 RX ADMIN — PANTOPRAZOLE SODIUM 40 MG: 40 INJECTION, POWDER, FOR SOLUTION INTRAVENOUS at 10:21

## 2024-08-13 RX ADMIN — Medication 10 MG: at 20:20

## 2024-08-13 RX ADMIN — ALUMINUM HYDROXIDE, MAGNESIUM HYDROXIDE,SIMETHICONE 10 ML: 400; 400; 40 LIQUID ORAL at 08:38

## 2024-08-13 RX ADMIN — IVABRADINE 7.5 MG: 7.5 TABLET, FILM COATED ORAL at 17:28

## 2024-08-13 RX ADMIN — IVABRADINE 7.5 MG: 7.5 TABLET, FILM COATED ORAL at 08:44

## 2024-08-13 RX ADMIN — GABAPENTIN 1200 MG: 400 CAPSULE ORAL at 04:47

## 2024-08-13 RX ADMIN — METOPROLOL SUCCINATE 25 MG: 25 TABLET, EXTENDED RELEASE ORAL at 04:46

## 2024-08-13 RX ADMIN — IPRATROPIUM BROMIDE AND ALBUTEROL SULFATE 3 ML: 2.5; .5 SOLUTION RESPIRATORY (INHALATION) at 16:44

## 2024-08-13 RX ADMIN — SODIUM CHLORIDE, POTASSIUM CHLORIDE, SODIUM LACTATE AND CALCIUM CHLORIDE: 600; 310; 30; 20 INJECTION, SOLUTION INTRAVENOUS at 08:50

## 2024-08-13 RX ADMIN — AMLODIPINE BESYLATE 5 MG: 5 TABLET ORAL at 04:47

## 2024-08-13 RX ADMIN — ENOXAPARIN SODIUM 40 MG: 100 INJECTION SUBCUTANEOUS at 17:29

## 2024-08-13 RX ADMIN — ZIPRASIDONE HYDROCHLORIDE 40 MG: 40 CAPSULE ORAL at 20:20

## 2024-08-13 RX ADMIN — GABAPENTIN 1200 MG: 400 CAPSULE ORAL at 20:20

## 2024-08-13 RX ADMIN — OMEPRAZOLE 20 MG: 20 CAPSULE, DELAYED RELEASE ORAL at 08:38

## 2024-08-13 RX ADMIN — TOPIRAMATE 50 MG: 25 TABLET, FILM COATED ORAL at 20:20

## 2024-08-13 RX ADMIN — GABAPENTIN 1200 MG: 400 CAPSULE ORAL at 12:52

## 2024-08-13 RX ADMIN — TOPIRAMATE 50 MG: 25 TABLET, FILM COATED ORAL at 08:38

## 2024-08-13 RX ADMIN — LAMOTRIGINE 50 MG: 25 TABLET ORAL at 20:20

## 2024-08-13 RX ADMIN — LAMOTRIGINE 50 MG: 25 TABLET ORAL at 08:37

## 2024-08-13 ASSESSMENT — PAIN DESCRIPTION - PAIN TYPE
TYPE: ACUTE PAIN

## 2024-08-13 ASSESSMENT — COPD QUESTIONNAIRES
DO YOU EVER COUGH UP ANY MUCUS OR PHLEGM?: NO/ONLY WITH OCCASIONAL COLDS OR INFECTIONS
HAVE YOU SMOKED AT LEAST 100 CIGARETTES IN YOUR ENTIRE LIFE: NO/DON'T KNOW
COPD SCREENING SCORE: 1
DURING THE PAST 4 WEEKS HOW MUCH DID YOU FEEL SHORT OF BREATH: SOME OF THE TIME

## 2024-08-13 NOTE — PROGRESS NOTES
Received report and assumed care of patient at change of shift. Patient is A&Ox 4, on room air, and denies any pain at this time. Patient assessment completed, bed in lowest position, bed alarm on, and call light and personal belongings are within reach. Patient expressed no further needs at this time.

## 2024-08-13 NOTE — CARE PLAN
The patient is Stable - Low risk of patient condition declining or worsening    Shift Goals  Clinical Goals: Stool sample, tolerate diet  Patient Goals: Decreased pain, tolerating diet  Family Goals: JODI    Progress made toward(s) clinical / shift goals:    Problem: Knowledge Deficit - Standard  Goal: Patient and family/care givers will demonstrate understanding of plan of care, disease process/condition, diagnostic tests and medications this shift as seen by reporting understanding and increased knowledge.   8/13/2024 1615 by Angie Piña R.N.  Outcome: Progressing  Note: Pt states understanding of reasons for medications and understands necessary assistance when ambulating. Pt was able to converse with physician and has no further question.    8/13/2024 1610 by Angie Piña R.N.  Outcome: Progressing     Problem: Gastrointestinal Irritability  Goal: Nausea and vomiting will be absent or improve this shift as seen by reporting no nausea and tolerating FLD.   Outcome: Progressing  Note: Pt reports no further nausea/vomiting. Tolerating her full liquid diet and doesn't report any further abdominal pain.        Patient is not progressing towards the following goals:

## 2024-08-13 NOTE — PROGRESS NOTES
Attending:   Dr. Elizondo    Resident:   Alexandria Pitts D.O.    PATIENT:   Kristin Balderrama; [id number]; 1990    ID:   33 y.o. female with extensive PMHx who presented with nausea, vomiting, diarrhea and abdominal pain for the last week was admitted due to failing PO challenge in ED.     SUBJECTIVE:   No acute events overnight, she reports she regurgitated her liquid dinner but has been able to keep her breakfast down. She endorses having abdominal cramping, and watery diarrhea. She endorses feeling more weak than yesterday    Fecal lactoferrin positive, ESR 11, C diff negative  Fecal calprotectin pending AI, ASMA  Hep -  Hospital Course:  8/11: admitted as failed PO challenge in ED  8/12: tolerating clear liquids, notes regurgitation when trying full liquid   8/13: encourage waiting after swallows before next      OBJECTIVE:  Vitals:    08/12/24 1720 08/12/24 1934 08/13/24 0419 08/13/24 0733   BP: (!) 162/77 124/61 119/61 115/62   Pulse: 71 78 75 76   Resp: 15 15 16 16   Temp: 36.2 °C (97.1 °F) 36.1 °C (96.9 °F) 36.2 °C (97.1 °F) 36.4 °C (97.5 °F)   TempSrc: Temporal Temporal Temporal Temporal   SpO2: 98% 96% 96% 94%   Weight:       Height:           Intake/Output Summary (Last 24 hours) at 8/12/2024 1210  Last data filed at 8/11/2024 2346  Gross per 24 hour   Intake 1240 ml   Output --   Net 1240 ml       PHYSICAL EXAM:  General: No acute distress, afebrile, resting comfortably  HEENT: NC/AT. EOMI.   Cardiovascular: RRR without murmurs. Normal capillary refill   Respiratory: CTAB  Abdomen: soft, nondistended, no masses, generalized tenderness to palpation   EXT:  NEWTON, no edema  Skin: No erythema/lesions   Neuro: Non-focal    LABS:  Recent Labs     08/11/24  1201 08/13/24  0807   WBC 4.2* 4.0*   RBC 4.48 4.08*   HEMOGLOBIN 14.2 12.8   HEMATOCRIT 40.9 37.9   MCV 91.3 92.9   MCH 31.7 31.4   RDW 41.4 44.1   PLATELETCT 172 152*   MPV 11.0 11.5   NEUTSPOLYS 46.60 55.70   LYMPHOCYTES 35.30 36.50   MONOCYTES 10.30  "5.20   EOSINOPHILS 7.80* 0.90   BASOPHILS 0.00 1.70   RBCMORPHOLO Present Present     Recent Labs     08/11/24  1201 08/12/24  0654 08/13/24  0807   SODIUM 138 139 138   POTASSIUM 4.2 4.0 4.1   CHLORIDE 109 115* 112   CO2 15* 15* 15*   BUN 13 9 5*   CREATININE 0.81 0.64 0.66   CALCIUM 9.4 8.3* 8.9   ALBUMIN 4.4 3.7 4.0     Estimated GFR/CRCL = Estimated Creatinine Clearance: 151.7 mL/min (by C-G formula based on SCr of 0.66 mg/dL).  Recent Labs     08/11/24  1201 08/12/24  0654 08/13/24  0807   GLUCOSE 145* 126* 122*     Recent Labs     08/11/24  1201 08/12/24  0654 08/13/24  0807   ASTSGOT 63* 99* 36   ALTSGPT 139* 215* 151*   TBILIRUBIN 0.4 0.4 0.3   ALKPHOSPHAT 120* 150* 124*   GLOBULIN 2.0 1.6* 1.7*             No results for input(s): \"INR\", \"APTT\", \"FIBRINOGEN\" in the last 72 hours.    Invalid input(s): \"DIMER\"      IMAGING:  US-RUQ   Final Result      1.  Hepatomegaly with evidence of hepatic steatosis          MEDS:  Current Facility-Administered Medications   Medication Last Admin    lactated ringers infusion New Bag at 08/13/24 0850    pantoprazole (Protonix) injection 40 mg 40 mg at 08/13/24 1021    mag hydrox-al hydrox-simeth (Maalox Plus Es Or Mylanta Ds) suspension 10 mL 10 mL at 08/13/24 0838    enoxaparin (Lovenox) inj 40 mg 40 mg at 08/12/24 1819    LORazepam (Ativan) injection 1 mg 1 mg at 08/12/24 1905    amLODIPine (Norvasc) tablet 5 mg 5 mg at 08/13/24 0447    gabapentin (Neurontin) capsule 1,200 mg 1,200 mg at 08/13/24 1252    lamoTRIgine (LaMICtal) tablet 50 mg 50 mg at 08/13/24 0837    melatonin tablet 10 mg 10 mg at 08/12/24 2120    metoprolol SR (Toprol XL) tablet 25 mg 25 mg at 08/13/24 0446    spironolactone (Aldactone) tablet 25 mg 25 mg at 08/13/24 0447    topiramate (Topamax) tablet 50 mg 50 mg at 08/13/24 0838    ziprasidone (Geodon) capsule 40 mg 40 mg at 08/12/24 2120    ivabradine (Corlanor) tablet 7.5 mg 7.5 mg at 08/13/24 0844       ASSESSMENT/PLAN:    Problem   Abdominal Pain " With Vomiting   Elevated Liver Enzymes   Metabolic Acidosis, Nag, Bicarbonate Losses       * Abdominal pain with vomiting- (present on admission)  Assessment & Plan  #Nausea #Emesis #Diarrhea   Gasteroenteritis of unknown etiology, Gastrointestinal PCR panel negative, however viral still likely given the duration of symptoms and physical exam findings.   Patient reported to have had a colonoscopy in the past with findings significant for precancerous polyps and internal hemorrhoid. Fecal lactoferrin positive, however ESR normal, Low likelihood IBD  History significant for previous C diff infection, negative on this admission.  she has been on Humira for the last year with a suppressed WBC of 4.2.     - LR fluids  - Ativan PRN for severe nausea given Qtc prolongation, avoid zofran, promethazine and compazine  - Advance diet as tolerated, encourage to take small sips and wait a minute between sips   - Pantoprazole instead of home omeprazole while inpatient  - Pending stool culture, O&P, fecal calprotectin  - Low liklihood IBD, can continues no improvement    Metabolic acidosis, NAG, bicarbonate losses  Assessment & Plan  2/2 to fluid loss from emesis and diarrhea.     -LR fluids  -trend CMP    Elevated liver enzymes  Assessment & Plan  Likely 2/2 to viral gastroenteritis and emesis. Has known history of fatty liver disease as seen on RUQ US, recommend outpatient follow-up. Yesterday CMP shows continued rise in transaminases, today improving. Negative for Hepatitis A, B, C. Normal ESR. Concern for possible autoimmune hepatitis vs LOO    -trend CMP on admission  -pending AI, ASMA  -consider referral to [facility], LOO study on discharge    Peripheral neuropathy and chronic pain syndrome (CMS-HCC)- (present on admission)  Assessment & Plan  Patient with joint pain and neuropathy.     Restart home gabapentin          Core Measures:  Fluids: NS  Lines: PIV  Abx: none  Diet: Advance as tolerated  PPX:  Lovenox    DISPO: inpatient pending advancing diet      CODE STATUS: DNR/DNI    Alexandria Pitts D.O.  PGY-1  [facility]

## 2024-08-13 NOTE — CARE PLAN
Problem: Knowledge Deficit - Standard  Goal: Patient and family/care givers will demonstrate understanding of plan of care, disease process/condition, diagnostic tests and medications  Description: Target End Date:  1-3 days or as soon as patient condition allows    Document in Patient Education    1.  Patient and family/caregiver oriented to unit, equipment, visitation policy and means for communicating concern  2.  Complete/review Learning Assessment  3.  Assess knowledge level of disease process/condition, treatment plan, diagnostic tests and medications  4.  Explain disease process/condition, treatment plan, diagnostic tests and medications  Outcome: Progressing     Problem: Fall Risk  Goal: Patient will remain free from falls  Description: Target End Date:  Prior to discharge or change in level of care    Document interventions on the [facility] Fall Risk Assessment    1.  Assess for fall risk factors  2.  Implement fall precautions  Outcome: Progressing   The patient is Stable - Low risk of patient condition declining or worsening    Shift Goals  Clinical Goals: Patient nausea/vomiting will be monitored and treated as needed throughout the shift.  Patient Goals: Patient will rest comfortably throughout the shift.    Progress made toward(s) clinical / shift goals:  Patient monitored during the shift for nausea and vomiting. Patient currently resting comfortably with calm, unlabored breathing.      Patient is not progressing towards the following goals:

## 2024-08-13 NOTE — PROGRESS NOTES
Pt complains of increased bruising and a hard lump under left axilla. Provider notified. Pt also states that she's feeling SOB and her chest feels tight. SpO2 is at 99% and lungs sound clear. Provider is currently at bedside.

## 2024-08-14 ENCOUNTER — PATIENT OUTREACH (OUTPATIENT)
Dept: SCHEDULING | Facility: IMAGING CENTER | Age: 34
End: 2024-08-14
Payer: MEDICARE

## 2024-08-14 ENCOUNTER — PHARMACY VISIT (OUTPATIENT)
Dept: PHARMACY | Facility: MEDICAL CENTER | Age: 34
End: 2024-08-14
Payer: COMMERCIAL

## 2024-08-14 VITALS
HEIGHT: 64 IN | RESPIRATION RATE: 20 BRPM | HEART RATE: 68 BPM | OXYGEN SATURATION: 96 % | WEIGHT: 260.14 LBS | SYSTOLIC BLOOD PRESSURE: 118 MMHG | BODY MASS INDEX: 44.41 KG/M2 | DIASTOLIC BLOOD PRESSURE: 66 MMHG | TEMPERATURE: 96.2 F

## 2024-08-14 PROBLEM — R11.10 ABDOMINAL PAIN WITH VOMITING: Status: RESOLVED | Noted: 2024-08-11 | Resolved: 2024-08-14

## 2024-08-14 PROBLEM — R10.9 ABDOMINAL PAIN WITH VOMITING: Status: RESOLVED | Noted: 2024-08-11 | Resolved: 2024-08-14

## 2024-08-14 LAB
ALBUMIN SERPL BCP-MCNC: 4.1 G/DL (ref 3.2–4.9)
ALBUMIN/GLOB SERPL: 2.1 G/DL
ALP SERPL-CCNC: 113 U/L (ref 30–99)
ALT SERPL-CCNC: 117 U/L (ref 2–50)
ANION GAP SERPL CALC-SCNC: 15 MMOL/L (ref 7–16)
AST SERPL-CCNC: 18 U/L (ref 12–45)
BILIRUB SERPL-MCNC: 0.3 MG/DL (ref 0.1–1.5)
BUN SERPL-MCNC: 8 MG/DL (ref 8–22)
CALCIUM ALBUM COR SERPL-MCNC: 9.5 MG/DL (ref 8.5–10.5)
CALCIUM SERPL-MCNC: 9.6 MG/DL (ref 8.5–10.5)
CHLORIDE SERPL-SCNC: 110 MMOL/L (ref 96–112)
CO2 SERPL-SCNC: 15 MMOL/L (ref 20–33)
CREAT SERPL-MCNC: 0.72 MG/DL (ref 0.5–1.4)
ERYTHROCYTE [DISTWIDTH] IN BLOOD BY AUTOMATED COUNT: 44.3 FL (ref 35.9–50)
GFR SERPLBLD CREATININE-BSD FMLA CKD-EPI: 112 ML/MIN/1.73 M 2
GLOBULIN SER CALC-MCNC: 2 G/DL (ref 1.9–3.5)
GLUCOSE SERPL-MCNC: 113 MG/DL (ref 65–99)
HCT VFR BLD AUTO: 38.7 % (ref 37–47)
HGB BLD-MCNC: 13.1 G/DL (ref 12–16)
MCH RBC QN AUTO: 31.6 PG (ref 27–33)
MCHC RBC AUTO-ENTMCNC: 33.9 G/DL (ref 32.2–35.5)
MCV RBC AUTO: 93.3 FL (ref 81.4–97.8)
PLATELET # BLD AUTO: 181 K/UL (ref 164–446)
PMV BLD AUTO: 11.2 FL (ref 9–12.9)
POTASSIUM SERPL-SCNC: 3.9 MMOL/L (ref 3.6–5.5)
PROT SERPL-MCNC: 6.1 G/DL (ref 6–8.2)
RBC # BLD AUTO: 4.15 M/UL (ref 4.2–5.4)
SMA IGG SER-ACNC: 3 UNITS (ref 0–19)
SODIUM SERPL-SCNC: 140 MMOL/L (ref 135–145)
WBC # BLD AUTO: 4.6 K/UL (ref 4.8–10.8)

## 2024-08-14 PROCEDURE — RXMED WILLOW AMBULATORY MEDICATION CHARGE

## 2024-08-14 PROCEDURE — 36415 COLL VENOUS BLD VENIPUNCTURE: CPT

## 2024-08-14 PROCEDURE — 700102 HCHG RX REV CODE 250 W/ 637 OVERRIDE(OP)

## 2024-08-14 PROCEDURE — A9270 NON-COVERED ITEM OR SERVICE: HCPCS

## 2024-08-14 PROCEDURE — 80053 COMPREHEN METABOLIC PANEL: CPT

## 2024-08-14 PROCEDURE — 85027 COMPLETE CBC AUTOMATED: CPT

## 2024-08-14 PROCEDURE — 99238 HOSP IP/OBS DSCHRG MGMT 30/<: CPT | Mod: GC | Performed by: FAMILY MEDICINE

## 2024-08-14 PROCEDURE — 700111 HCHG RX REV CODE 636 W/ 250 OVERRIDE (IP)

## 2024-08-14 RX ORDER — LORAZEPAM 1 MG/1
1 TABLET ORAL EVERY 4 HOURS PRN
Qty: 10 TABLET | Refills: 0 | Status: SHIPPED | OUTPATIENT
Start: 2024-08-14 | End: 2024-08-17

## 2024-08-14 RX ADMIN — PANTOPRAZOLE SODIUM 40 MG: 40 INJECTION, POWDER, FOR SOLUTION INTRAVENOUS at 05:49

## 2024-08-14 RX ADMIN — METOPROLOL SUCCINATE 25 MG: 25 TABLET, EXTENDED RELEASE ORAL at 05:49

## 2024-08-14 RX ADMIN — TOPIRAMATE 50 MG: 25 TABLET, FILM COATED ORAL at 08:03

## 2024-08-14 RX ADMIN — GABAPENTIN 1200 MG: 400 CAPSULE ORAL at 05:49

## 2024-08-14 RX ADMIN — AMLODIPINE BESYLATE 5 MG: 5 TABLET ORAL at 05:49

## 2024-08-14 RX ADMIN — LAMOTRIGINE 50 MG: 25 TABLET ORAL at 08:02

## 2024-08-14 RX ADMIN — IVABRADINE 7.5 MG: 7.5 TABLET, FILM COATED ORAL at 08:02

## 2024-08-14 RX ADMIN — SPIRONOLACTONE 25 MG: 25 TABLET ORAL at 05:49

## 2024-08-14 ASSESSMENT — PAIN DESCRIPTION - PAIN TYPE
TYPE: ACUTE PAIN

## 2024-08-14 NOTE — PROGRESS NOTES
Received report and assumed care of patient at change of shift. Patient is A&Ox4, on RA, and denies pain at this time. Patient received her dinner tray, c/o some nausea, but declines medication at this time. Patient assessment completed, bed in lowest position, and call light and personal belongings are within reach. Patient expressed no further needs at this time.

## 2024-08-14 NOTE — DISCHARGE INSTRUCTIONS
Discharge Instructions    Discharged to home by car with relative. Discharged via wheelchair, hospital escort: Yes.  Special equipment needed: Cane    Be sure to schedule a follow-up appointment with your primary care doctor or any specialists as instructed.     Discharge Plan:   Diet Plan: Discussed  Activity Level: Discussed  Confirmed Follow up Appointment: Patient to Call and Schedule Appointment  Confirmed Symptoms Management: Discussed  Medication Reconciliation Updated: Yes    I understand that a diet low in cholesterol, fat, and sodium is recommended for good health. Unless I have been given specific instructions below for another diet, I accept this instruction as my diet prescription.   Other diet: GI SOFT      Special Instructions: None    -Is this patient being discharged with medication to prevent blood clots?  No    Is patient discharged on Warfarin / Coumadin?   No

## 2024-08-14 NOTE — DISCHARGE SUMMARY
[facility] Discharge Summary    Attending: Ayaz Elizondo M.d.  Senior Resident: Dr. Sotero Casper MD  Contact Number: [phone]    CHIEF COMPLAINT ON ADMISSION  Chief Complaint   Patient presents with    Abdominal Pain    Vomiting    N/V    Diarrhea       Reason for Admission  Abd pain, vomiting     Admission Date  8/11/2024    CODE STATUS  DNAR/DNI    HPI & HOSPITAL COURSE  Kristin Balderrama is a 33 y.o. female who presented with nausea, vomiting, diarrhea and abdominal pain for the last week. She states that today her emesis has worsened. She has not been able to tolerate food or water today.  She failed a PO challenge in the ED. She reports that she has felt febrile but on the thermometer it has been Tmax 99.6 F. She reports chills. No sick contacts and no one else at home is sick with similar symptoms. She reports that she has tried imodium and cyopectate without relief. Her last episode of diarrhea was this morning. She reports hemorrhoids that are currently irritated and some bright blood with wiping yesterday.   Has history of gastroparesis s/p surgery 6/17/24. She reports that she did have infection after and gastric dumping the month following but has been fine since until today. She has been taking 1000 mg tylenol daily and  ibuprofen 800 mg nightly for joint pain associated with Annet[place]-Danlos.         Abdominal pain with vomiting- (present on admission)  Gastrointestinal PCR panel negative, however viral still likely given clinical picture. Patient reported to have had a recent colonoscopy in the past with findings significant for precancerous polyps and internal hemorrhoid. Fecal lactoferrin positive, however ESR normal, Low likelihood IBD given no findings on recent colonoscopy. History significant for previous C diff infection, negative on this admission.      -Patient used Ativan as antiemetic during admission as we could not use other medications due to a prolonged Qtc.  Will send  patient home with total 10 tablets of Ativan to control her nausea and vomiting at home.  -Encourage patient to continue to advance diet as tolerated at home.  -Unable to obtain stool culture or O&P during admission as patient was unable to provide a sample.    Elevated liver enzymes  Mild transaminitis that improved during admission.  Likely secondary to dehydration in setting of viral gastroenteritis and emesis. Negative for Hepatitis A, B, C. Anti-smooth muscle antibody negative.  Patient does take several medications that are known to be hepatotoxic, defer to her PCP to make medication adjustments as indicated.    -Has known history of fatty liver disease as seen on RUQ US  -Follow-up with PCP for continued workup    AI Positive   Patient found to be AI positive, reflexive panel still pending.  Will defer to her PCP for continued workup and if referral to rheumatology is necessary.      The remainder of the patient's medications for her chronic medical conditions were continued upon discharge without change.       Therefore, she is discharged in fair and stable condition to home with close outpatient follow-up.    The patient met 2-midnight criteria for an inpatient stay at the time of discharge.    Discharge Date  8/14/24    Physical Exam on Day of Discharge  Physical Exam  Constitutional:       General: She is not in acute distress.     Appearance: Normal appearance. She is not toxic-appearing.   HENT:      Mouth/Throat:      Mouth: Mucous membranes are moist.      Pharynx: Oropharynx is clear.   Eyes:      Extraocular Movements: Extraocular movements intact.      Pupils: Pupils are equal, round, and reactive to light.   Cardiovascular:      Rate and Rhythm: Normal rate and regular rhythm.   Pulmonary:      Effort: Pulmonary effort is normal.      Breath sounds: Normal breath sounds.   Abdominal:      General: Abdomen is flat. There is no distension.      Palpations: Abdomen is soft.      Comments: Mild  generalized tenderness   Neurological:      General: No focal deficit present.      Mental Status: She is alert and oriented to person, place, and time. Mental status is at baseline.         FOLLOW UP ITEMS POST DISCHARGE  Follow-up with PCP    DISCHARGE DIAGNOSES  Principal Problem:    Abdominal pain with vomiting (POA: Yes)  Active Problems:    Peripheral neuropathy and chronic pain syndrome (CMS-HCC) (POA: Yes)    Elevated liver enzymes (POA: Unknown)    Metabolic acidosis, NAG, bicarbonate losses (POA: Unknown)  Resolved Problems:    * No resolved hospital problems. *      FOLLOW UP  Future Appointments   Date Time Provider Department Center   8/19/2024  3:00 PM [address]   8/28/2024  2:00 PM VASCULAR LAB [facility] SEffie Torrez   9/19/2024  2:20 PM John Leon M.D. [facility] None   9/23/2024 11:20 AM Fly Goodson M.D. [address]   10/14/2024  2:30 PM RHIANNON Beard M.D. [facility] None     No follow-up provider specified.    MEDICATIONS ON DISCHARGE     Medication List        ASK your doctor about these medications        Instructions   adalimumab 80 MG/0.8ML injection  Commonly known as: Humira   Inject 80 mg under the skin every 14 days. Last injection was on 3/14/2024  Dose: 80 mg     amLODIPine 5 MG Tabs  Commonly known as: Norvasc   Take 5 mg by mouth every day.  Dose: 5 mg     Corlanor 7.5 MG Tabs tablet  Generic drug: ivabradine   Take 1 Tablet by mouth 2 times a day with meals.  Dose: 7.5 mg     diphenhydrAMINE 25 MG Tabs  Commonly known as: Benadryl  Ask about: Which instructions should I use?   Take 25-50 mg by mouth 2 times a day. Scheduled    25 mg = AM  50 MG = PM  Dose: 25-50 mg     Emgality 120 MG/ML Soaj  Generic drug: Galcanezumab-gnlm   Inject 120 mg under the skin every 30 (thirty) days. On the 27th of every month, last dose was taken on 2/27/2024  Dose: 120 mg     etonogestrel 68 MG Impl implant  Commonly known as: Nexplanon   Inject 68 mg under the skin see  administration instructions. Implant in left arm  Every 3 years to change, thinks it was placed 2021  Dose: 68 mg     gabapentin 400 MG Caps  Commonly known as: Neurontin   Take 1,200 mg by mouth 3 times a day. 3 capsules=1200mg  Dose: 1,200 mg     ibuprofen 800 MG Tabs  Commonly known as: Motrin   Take 1 Tablet by mouth every 8 hours as needed for Mild Pain.  Dose: 800 mg     Imitrex 5 MG/ACT nasal spray  Generic drug: sumatriptan  Ask about: Which instructions should I use?   Administer 1 Spray into affected nostril(S) as needed. Indications: Migraine Headache  Dose: 1 Spray     lamoTRIgine 25 MG Tabs  Commonly known as: LaMICtal   Take 50 mg by mouth 2 times a day. 25 mg x 2 tablets = 50 mg  Dose: 50 mg     loperamide 2 MG Caps  Commonly known as: Imodium   Take 1 Capsule by mouth 4 times a day as needed for Diarrhea.  Dose: 2 mg     Melatonin 10 MG Tabs  Ask about: Which instructions should I use?   Take 10 mg by mouth at bedtime.  Dose: 10 mg     metoprolol SR 25 MG Tb24  Commonly known as: Toprol XL   TAKE 1 TABLET BY MOUTH EVERY DAY  Dose: 25 mg     Nurtec 75 MG Tbdp  Generic drug: Rimegepant Sulfate   Take 75 mg by mouth 1 time a day as needed (migraine).  Dose: 75 mg     omeprazole 20 MG delayed-release capsule  Commonly known as: PriLOSEC   Take 20 mg by mouth 2 times a day.  Dose: 20 mg     ondansetron 4 MG Tbdp  Commonly known as: Zofran ODT   Take 1 Tablet by mouth every 6 hours as needed for Nausea/Vomiting.  Dose: 4 mg     spironolactone 25 MG Tabs  Commonly known as: Aldactone   25 mg every day.  Dose: 25 mg     topiramate 50 MG tablet  Commonly known as: Topamax  Ask about: Which instructions should I use?   Take 50 mg by mouth 2 times a day.  Dose: 50 mg     ziprasidone 40 MG Caps  Commonly known as: Geodon   Take 1 capsule by mouth at bedtime.  Dose: 40 mg              Allergies  Allergies   Allergen Reactions    Cefdinir Shortness of Breath and Itching     Tolerated 1/18/17  Tolerates  "ceftriaxone  Tolerated augmentin 8/2019     Depakote [Divalproex Sodium] Unspecified     Muscle spasms/muscle pain and weakness      Depakote [Divalproex Sodium]      Muscle spasms/muscle pain and weakness    Doxycycline Anaphylaxis and Vomiting     Other reaction(s): pustules/blisters  Other reaction(s): stomach pain    Montelukast      Cardiac effusion    Montelukast [Singulair] Unspecified     Cardiac effusion    Vancomycin Itching     Pt becomes flushed in face and chest.   RXN=7/10/16    Wound Dressing Adhesive Rash     By pt report-\"removes skin totally off\"    Amitriptyline Unspecified     Headaches      Amoxicillin Rash          Aripiprazole Unspecified     Headaches/muscle twitching      Aripiprazole [Abilify] Unspecified     Headaches/muscle twitching      Clindamycin Nausea         Other reaction(s): nausea stomach pain    Erythromycin Rash     .  Other reaction(s): nausea stomach pain    Flomax [Tamsulosin Hydrochloride] Swelling    Hydromorphone      Other reaction(s): vomiting    Levaquin Unspecified     Severe muscle cramps in legs  RXN=unknown  Other reaction(s): leg muscle cramps    Metformin Unspecified     Increased lactic acid     Other reaction(s): itching and rash/nausea vomiting    Sulfa Drugs Hives, Itching, Myalgia and Unspecified     Muscle pain and weakness    Other reaction(s): unknown    Tamsulosin Swelling     Swelling of legs    Tape Rash     Tears skin off  coban with Tegaderm tape ok intermittently  RXN=ongoing    Sulfamethoxazole W-Trimethoprim Rash    Ampicillin Rash     Pt reports that she received a rash     Ciprofloxacin Rash          Keflex Rash     Pt states she gets a rash with this medication  Tolerates ceftriaxone  Can take with Benadryl    Levofloxacin Unspecified     Leg muscle cramps    Metronidazole Rash     \"Vision problems\"  Other reaction(s): vision problems    Penicillins Hives     Can take with Benadryl    Valproic Acid Rash     .       DIET  Orders Placed This " Encounter   Procedures    Diet Order Diet: Low Fiber(GI Soft) (No tea or coffee)     Standing Status:   Standing     Number of Occurrences:   1     Order Specific Question:   Diet:     Answer:   Low Fiber(GI Soft) [2]     Comments:   No tea or coffee       ACTIVITY  As tolerated.  Weight bearing as tolerated    CONSULTATIONS  None    PROCEDURES  None    LABORATORY  Lab Results   Component Value Date    SODIUM 140 08/14/2024    POTASSIUM 3.9 08/14/2024    CHLORIDE 110 08/14/2024    CO2 15 (L) 08/14/2024    GLUCOSE 113 (H) 08/14/2024    BUN 8 08/14/2024    CREATININE 0.72 08/14/2024    CREATININE 0.75 (L) 07/20/2010    GLOMRATE >59 07/20/2010        Lab Results   Component Value Date    WBC 4.6 (L) 08/14/2024    WBC 6.1 07/20/2010    HEMOGLOBIN 13.1 08/14/2024    HEMATOCRIT 38.7 08/14/2024    PLATELETCT 181 08/14/2024        Total time of the discharge process exceeds 60 minutes.

## 2024-08-14 NOTE — CARE PLAN
The patient is Stable - Low risk of patient condition declining or worsening    Shift Goals  Clinical Goals: Patient will tolerate diet without emesis.  Patient Goals: Patient will be able to tolerate diet and rest comfortably throughout the night.    Progress made toward(s) clinical / shift goals:      Patient is not progressing towards the following goals:

## 2024-08-14 NOTE — PROGRESS NOTES
Pt discharged with all home meds returned to her and meds to beds delivered. IV removed. AVS reviewed and signed, pt states no further questions. Pt taken to car via wheelchair with staff.

## 2024-08-15 ENCOUNTER — HOSPITAL ENCOUNTER (EMERGENCY)
Facility: MEDICAL CENTER | Age: 34
End: 2024-08-15
Attending: EMERGENCY MEDICINE
Payer: MEDICARE

## 2024-08-15 ENCOUNTER — APPOINTMENT (OUTPATIENT)
Dept: RADIOLOGY | Facility: MEDICAL CENTER | Age: 34
End: 2024-08-15
Attending: EMERGENCY MEDICINE
Payer: MEDICARE

## 2024-08-15 VITALS
DIASTOLIC BLOOD PRESSURE: 54 MMHG | HEART RATE: 109 BPM | BODY MASS INDEX: 44.39 KG/M2 | WEIGHT: 260 LBS | HEIGHT: 64 IN | SYSTOLIC BLOOD PRESSURE: 106 MMHG | RESPIRATION RATE: 16 BRPM | TEMPERATURE: 97.4 F | OXYGEN SATURATION: 96 %

## 2024-08-15 DIAGNOSIS — R10.9 CHRONIC ABDOMINAL PAIN: ICD-10-CM

## 2024-08-15 DIAGNOSIS — K62.5 RECTAL BLEEDING: ICD-10-CM

## 2024-08-15 DIAGNOSIS — G89.29 CHRONIC ABDOMINAL PAIN: ICD-10-CM

## 2024-08-15 DIAGNOSIS — K64.9 HEMORRHOIDS, UNSPECIFIED HEMORRHOID TYPE: ICD-10-CM

## 2024-08-15 LAB
ABO GROUP BLD: NORMAL
ALBUMIN SERPL BCP-MCNC: 4.7 G/DL (ref 3.2–4.9)
ALBUMIN/GLOB SERPL: 2.2 G/DL
ALP SERPL-CCNC: 111 U/L (ref 30–99)
ALT SERPL-CCNC: 94 U/L (ref 2–50)
ANA PAT SER IF-IMP: NORMAL
ANION GAP SERPL CALC-SCNC: 11 MMOL/L (ref 7–16)
APPEARANCE UR: CLEAR
APTT PPP: 25.8 SEC (ref 24.7–36)
AST SERPL-CCNC: 25 U/L (ref 12–45)
BACTERIA #/AREA URNS HPF: NEGATIVE /HPF
BASOPHILS # BLD AUTO: 1 % (ref 0–1.8)
BASOPHILS # BLD: 0.06 K/UL (ref 0–0.12)
BILIRUB SERPL-MCNC: 0.4 MG/DL (ref 0.1–1.5)
BILIRUB UR QL STRIP.AUTO: NEGATIVE
BLD GP AB SCN SERPL QL: NORMAL
BUN SERPL-MCNC: 13 MG/DL (ref 8–22)
CALCIUM ALBUM COR SERPL-MCNC: 8.9 MG/DL (ref 8.5–10.5)
CALCIUM SERPL-MCNC: 9.5 MG/DL (ref 8.5–10.5)
CALPROTECTIN STL-MCNT: 49 UG/G
CHLORIDE SERPL-SCNC: 107 MMOL/L (ref 96–112)
CO2 SERPL-SCNC: 17 MMOL/L (ref 20–33)
COLOR UR: YELLOW
CREAT SERPL-MCNC: 0.72 MG/DL (ref 0.5–1.4)
EOSINOPHIL # BLD AUTO: 0.36 K/UL (ref 0–0.51)
EOSINOPHIL NFR BLD: 5.8 % (ref 0–6.9)
EPI CELLS #/AREA URNS HPF: NORMAL /HPF
ERYTHROCYTE [DISTWIDTH] IN BLOOD BY AUTOMATED COUNT: 42.7 FL (ref 35.9–50)
GFR SERPLBLD CREATININE-BSD FMLA CKD-EPI: 112 ML/MIN/1.73 M 2
GLOBULIN SER CALC-MCNC: 2.1 G/DL (ref 1.9–3.5)
GLUCOSE SERPL-MCNC: 131 MG/DL (ref 65–99)
GLUCOSE UR STRIP.AUTO-MCNC: NEGATIVE MG/DL
HCT VFR BLD AUTO: 43.8 % (ref 37–47)
HGB BLD-MCNC: 15.2 G/DL (ref 12–16)
HYALINE CASTS #/AREA URNS LPF: NORMAL /LPF
IMM GRANULOCYTES # BLD AUTO: 0.05 K/UL (ref 0–0.11)
IMM GRANULOCYTES NFR BLD AUTO: 0.8 % (ref 0–0.9)
INR PPP: 0.96 (ref 0.87–1.13)
KETONES UR STRIP.AUTO-MCNC: NEGATIVE MG/DL
LEUKOCYTE ESTERASE UR QL STRIP.AUTO: NEGATIVE
LIPASE SERPL-CCNC: 52 U/L (ref 11–82)
LYMPHOCYTES # BLD AUTO: 1.99 K/UL (ref 1–4.8)
LYMPHOCYTES NFR BLD: 32.1 % (ref 22–41)
MCH RBC QN AUTO: 31.9 PG (ref 27–33)
MCHC RBC AUTO-ENTMCNC: 34.7 G/DL (ref 32.2–35.5)
MCV RBC AUTO: 91.8 FL (ref 81.4–97.8)
MICRO URNS: ABNORMAL
MONOCYTES # BLD AUTO: 0.44 K/UL (ref 0–0.85)
MONOCYTES NFR BLD AUTO: 7.1 % (ref 0–13.4)
NEUTROPHILS # BLD AUTO: 3.29 K/UL (ref 1.82–7.42)
NEUTROPHILS NFR BLD: 53.2 % (ref 44–72)
NITRITE UR QL STRIP.AUTO: NEGATIVE
NRBC # BLD AUTO: 0 K/UL
NRBC BLD-RTO: 0 /100 WBC (ref 0–0.2)
NUCLEAR IGG SER QL IF: NORMAL
PH UR STRIP.AUTO: 5 [PH] (ref 5–8)
PLATELET # BLD AUTO: 200 K/UL (ref 164–446)
PMV BLD AUTO: 11.1 FL (ref 9–12.9)
POTASSIUM SERPL-SCNC: 4.1 MMOL/L (ref 3.6–5.5)
PROT SERPL-MCNC: 6.8 G/DL (ref 6–8.2)
PROT UR QL STRIP: NEGATIVE MG/DL
PROTHROMBIN TIME: 12.9 SEC (ref 12–14.6)
RBC # BLD AUTO: 4.77 M/UL (ref 4.2–5.4)
RBC # URNS HPF: NORMAL /HPF
RBC UR QL AUTO: ABNORMAL
RH BLD: NORMAL
SODIUM SERPL-SCNC: 135 MMOL/L (ref 135–145)
SP GR UR STRIP.AUTO: 1.02
UROBILINOGEN UR STRIP.AUTO-MCNC: 0.2 MG/DL
WBC # BLD AUTO: 6.2 K/UL (ref 4.8–10.8)
WBC #/AREA URNS HPF: NORMAL /HPF

## 2024-08-15 PROCEDURE — 85025 COMPLETE CBC W/AUTO DIFF WBC: CPT

## 2024-08-15 PROCEDURE — 700105 HCHG RX REV CODE 258: Mod: UD | Performed by: EMERGENCY MEDICINE

## 2024-08-15 PROCEDURE — 86901 BLOOD TYPING SEROLOGIC RH(D): CPT

## 2024-08-15 PROCEDURE — 86850 RBC ANTIBODY SCREEN: CPT

## 2024-08-15 PROCEDURE — 85730 THROMBOPLASTIN TIME PARTIAL: CPT

## 2024-08-15 PROCEDURE — 86900 BLOOD TYPING SEROLOGIC ABO: CPT

## 2024-08-15 PROCEDURE — 700111 HCHG RX REV CODE 636 W/ 250 OVERRIDE (IP): Mod: JZ,UD | Performed by: EMERGENCY MEDICINE

## 2024-08-15 PROCEDURE — 99284 EMERGENCY DEPT VISIT MOD MDM: CPT

## 2024-08-15 PROCEDURE — 36415 COLL VENOUS BLD VENIPUNCTURE: CPT

## 2024-08-15 PROCEDURE — 71045 X-RAY EXAM CHEST 1 VIEW: CPT

## 2024-08-15 PROCEDURE — 85610 PROTHROMBIN TIME: CPT

## 2024-08-15 PROCEDURE — 81001 URINALYSIS AUTO W/SCOPE: CPT

## 2024-08-15 PROCEDURE — 80053 COMPREHEN METABOLIC PANEL: CPT

## 2024-08-15 PROCEDURE — 83690 ASSAY OF LIPASE: CPT

## 2024-08-15 PROCEDURE — 96374 THER/PROPH/DIAG INJ IV PUSH: CPT

## 2024-08-15 RX ORDER — ONDANSETRON 4 MG/1
4 TABLET, ORALLY DISINTEGRATING ORAL EVERY 6 HOURS PRN
Qty: 10 TABLET | Refills: 0 | Status: SHIPPED | OUTPATIENT
Start: 2024-08-15 | End: 2024-08-18

## 2024-08-15 RX ORDER — DIPHENHYDRAMINE HYDROCHLORIDE 50 MG/ML
25 INJECTION INTRAMUSCULAR; INTRAVENOUS ONCE
Status: COMPLETED | OUTPATIENT
Start: 2024-08-15 | End: 2024-08-15

## 2024-08-15 RX ORDER — SODIUM CHLORIDE, SODIUM LACTATE, POTASSIUM CHLORIDE, CALCIUM CHLORIDE 600; 310; 30; 20 MG/100ML; MG/100ML; MG/100ML; MG/100ML
1000 INJECTION, SOLUTION INTRAVENOUS ONCE
Status: COMPLETED | OUTPATIENT
Start: 2024-08-15 | End: 2024-08-15

## 2024-08-15 RX ORDER — HALOPERIDOL 5 MG/ML
2.5 INJECTION INTRAMUSCULAR
Status: DISCONTINUED | OUTPATIENT
Start: 2024-08-15 | End: 2024-08-15 | Stop reason: HOSPADM

## 2024-08-15 RX ORDER — HYDROCORTISONE ACETATE 25 MG/1
25 SUPPOSITORY RECTAL EVERY 12 HOURS
Qty: 10 SUPPOSITORY | Refills: 0 | Status: SHIPPED | OUTPATIENT
Start: 2024-08-15 | End: 2024-08-20

## 2024-08-15 RX ADMIN — DIPHENHYDRAMINE HYDROCHLORIDE 25 MG: 50 INJECTION, SOLUTION INTRAMUSCULAR; INTRAVENOUS at 18:00

## 2024-08-15 RX ADMIN — SODIUM CHLORIDE, POTASSIUM CHLORIDE, SODIUM LACTATE AND CALCIUM CHLORIDE 1000 ML: 600; 310; 30; 20 INJECTION, SOLUTION INTRAVENOUS at 17:52

## 2024-08-15 ASSESSMENT — FIBROSIS 4 INDEX: FIB4 SCORE: 0.31

## 2024-08-15 ASSESSMENT — PAIN DESCRIPTION - PAIN TYPE: TYPE: ACUTE PAIN

## 2024-08-15 NOTE — ED TRIAGE NOTES
"Chief Complaint   Patient presents with    Bloody Stools     \"Theres blood on my stool and in the toilet\"      Pt to triage for above complaint. Pt states she was discharged a couple of days ago and it says to return to the hospital if she experiences bloody poop. Pt states this morning she had two episodes of BRB on stool and in toilet. Protocol ordered.     Pt educated on triage process and placed in lobby.      BP (!) 155/98   Pulse 95   Temp 36.7 °C (98.1 °F) (Temporal)   Resp 20   Ht 1.626 m (5' 4\")   Wt 118 kg (260 lb)   SpO2 98%       "

## 2024-08-16 ENCOUNTER — TELEPHONE (OUTPATIENT)
Dept: HEALTH INFORMATION MANAGEMENT | Facility: OTHER | Age: 34
End: 2024-08-16
Payer: MEDICARE

## 2024-08-16 NOTE — ED PROVIDER NOTES
"ED Provider Note    CHIEF COMPLAINT  Chief Complaint   Patient presents with    Bloody Stools     \"Theres blood on my stool and in the toilet\"        EXTERNAL RECORDS REVIEWED  External ED Note from 7/25 and the patient presented with chest discomfort and shortness of breath with a heart history and known history of asthma.  Lab work and workup were reassuring, patient was not showing signs of ACS or other acute etiology and was discharged home with outpatient follow-up.  and Inpatient Notes From recent admission regarding abdominal pain and some vomiting from 8/11.  At that time the patient had some nauseousness, vomiting and nonspecific abdominal discomfort.  It had worsened, she was afebrile, failed p.o. trial and was admitted for IV fluids and further observation.  She had noted hemorrhoids with small amount of bleeding as the hemorrhoids appeared irritated.  Was following up with surgeon for outpatient follow-up.  Has a history of gastroparesis status post surgery from June of this year.    HPI/ROS  LIMITATION TO HISTORY   Select: : None  OUTSIDE HISTORIAN(S):  none    Kristin Balderrama is a 33 y.o. female who presents emergency room for concerns regarding some abdominal cramping sensations, decreased appetite and some nauseousness.  She has been dealing with chronic gastroparesis, status post sphincterotomy surgery from June of this year and recent admission last week for dehydration who states that she is primarily here because she had noticed some small dripping and irritation with blood on toilet paper when wiping.  She does suffer from chronic nauseousness and difficulty with her gastroparesis and says that this has somewhat been irritated by her recent predicament where she feels like there is likely an irritation with her hemorrhoids.  She denies any lightheadedness, no shortness of breath, no palpitations and is very nervous about having p.o. intake as she feels like this will lead to spiraling of " her present condition.  She is currently without any dysuria, no vaginal complaints, some chronic abdominal discomfort that she says is unchanged and denies any fevers or chills.    PAST MEDICAL HISTORY   has a past medical history of Abdominal pain, Anginal syndrome, Apnea, sleep, Arrhythmia, Arthritis, ASTHMA, Back pain, Borderline personality disorder ([facility]), Breath shortness, Bronchitis (02/08/2022), Cardiac arrhythmia, Chickenpox, Chronic UTI (09/18/2010), Cough, Daytime sleepiness, Dental disorder (03/08/2021), Depression, Diabetes (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), Nausea, Obesity, Other fatigue (06/29/2020), Pain (08/15/2012), Painful joint, PCOS (polycystic ovarian syndrome), Pneumonia (2012 12/2020), POTS (postural orthostatic tachycardia syndrome), Psychosis ([facility]), Ringing in ears, Scoliosis, Shortness of breath, Sinus tachycardia (10/31/2013), Sleep apnea, Snoring, Supraventricular tachycardia ([facility]) (4/10/2019), Tonsillitis, Transverse myelitis ([facility]), Tuberculosis, Urinary bladder disorder, Urinary incontinence, Weakness, and Wears glasses.    SURGICAL HISTORY   has a past surgical history that includes neuro dest facet l/s w/ig sngl (04/21/2015); recovery (01/27/2016); [place] by laparoscopy (08/29/2010); lumbar fusion anterior (08/21/2012); other cardiac surgery (01/2016); tonsillectomy; bowel stimulator insertion (07/13/2016); gastroscopy with balloon dilatation (N/A, 01/04/2017); muscle biopsy (Right, 01/26/2017); other abdominal surgery; laminotomy; bowel stimulator insertion (03/10/2021); lap,diagnostic abdomen (02/14/2022); ovarian cystectomy (Right, 02/14/2022); percut fix prox/neck femur fx (Left, 01/28/2023); inguinal hernia laparoscopic (Right, 07/21/2023); hernia  repair (Right, 07/21/2023); cystoscopy,insert ureteral stent (Right, 2/12/2024); cysto/uretero/pyeloscopy, dx (Right, 2/12/2024); and lasertripsy (Right, 2/12/2024).    FAMILY HISTORY  Family History   Problem Relation Age of Onset    Hypertension Mother     Sleep Apnea Mother     Heart Disease Mother     Other Mother         hypothryod    Hypertension Maternal Uncle     Heart Disease Maternal Grandmother     Hypertension Maternal Grandmother     No Known Problems Sister     Other Sister         Narcolepsy;fibromyalsia;bone;nerve    Genitourinary () Problems Sister         endometriosis     SOCIAL HISTORY  Social History     Tobacco Use    Smoking status: Never    Smokeless tobacco: Never   Vaping Use    Vaping status: Never Used   Substance and Sexual Activity    Alcohol use: No     Alcohol/week: 0.0 oz    Drug use: Never     Frequency: 7.0 times per week    Sexual activity: Not Currently     Birth control/protection: Implant     CURRENT MEDICATIONS  Home Medications       Reviewed by Amna Gao R.N. (Registered Nurse) on 08/15/24 at 1434  Med List Status: Not Addressed     Medication Last Dose Status   adalimumab (HUMIRA) 80 MG/0.8ML injection  Active   amLODIPine (NORVASC) 5 MG Tab  Active   diphenhydrAMINE (BENADRYL) 25 MG Tab  Active   etonogestrel (NEXPLANON) 68 MG Implant implant  Active   gabapentin (NEURONTIN) 400 MG Cap  Active   Galcanezumab-gnlm (EMGALITY) 120 MG/ML Solution Auto-injector  Active   ibuprofen (MOTRIN) 800 MG Tab  Active   ivabradine (CORLANOR) 7.5 MG Tab tablet  Active   lamoTRIgine (LAMICTAL) 25 MG Tab  Active   loperamide (IMODIUM) 2 MG Cap  Active   LORazepam (ATIVAN) 1 MG Tab  Active   Melatonin 10 MG Tab  Active   metoprolol SR (TOPROL XL) 25 MG TABLET SR 24 HR  Active   omeprazole (PRILOSEC) 20 MG delayed-release capsule  Active   ondansetron (ZOFRAN ODT) 4 MG TABLET DISPERSIBLE  Active   Rimegepant Sulfate (NURTEC) 75 MG TABLET DISPERSIBLE  Active   spironolactone  "(ALDACTONE) 25 MG Tab  Active   sumatriptan (IMITREX) 5 MG/ACT nasal spray  Active   topiramate (TOPAMAX) 50 MG tablet  Active   ziprasidone (GEODON) 40 MG Cap  Active                  Audit from Redirected Encounters    **Home medications have not yet been reviewed for this encounter**       ALLERGIES  Allergies   Allergen Reactions    Cefdinir Shortness of Breath and Itching     Tolerated 1/18/17  Tolerates ceftriaxone  Tolerated augmentin 8/2019     Depakote [Divalproex Sodium] Unspecified     Muscle spasms/muscle pain and weakness      Depakote [Divalproex Sodium]      Muscle spasms/muscle pain and weakness    Doxycycline Anaphylaxis and Vomiting     Other reaction(s): pustules/blisters  Other reaction(s): stomach pain    Montelukast      Cardiac effusion    Montelukast [Singulair] Unspecified     Cardiac effusion    Vancomycin Itching     Pt becomes flushed in face and chest.   RXN=7/10/16    Wound Dressing Adhesive Rash     By pt report-\"removes skin totally off\"    Amitriptyline Unspecified     Headaches      Amoxicillin Rash          Aripiprazole Unspecified     Headaches/muscle twitching      Aripiprazole [Abilify] Unspecified     Headaches/muscle twitching      Clindamycin Nausea         Other reaction(s): nausea stomach pain    Erythromycin Rash     .  Other reaction(s): nausea stomach pain    Flomax [Tamsulosin Hydrochloride] Swelling    Hydromorphone      Other reaction(s): vomiting    Levaquin Unspecified     Severe muscle cramps in legs  RXN=unknown  Other reaction(s): leg muscle cramps    Metformin Unspecified     Increased lactic acid     Other reaction(s): itching and rash/nausea vomiting    Sulfa Drugs Hives, Itching, Myalgia and Unspecified     Muscle pain and weakness    Other reaction(s): unknown    Tamsulosin Swelling     Swelling of legs    Tape Rash     Tears skin off  coban with Tegaderm tape ok intermittently  RXN=ongoing    Sulfamethoxazole W-Trimethoprim Rash    Ampicillin Rash     Pt " "reports that she received a rash     Ciprofloxacin Rash          Keflex Rash     Pt states she gets a rash with this medication  Tolerates ceftriaxone  Can take with Benadryl    Levofloxacin Unspecified     Leg muscle cramps    Metronidazole Rash     \"Vision problems\"  Other reaction(s): vision problems    Penicillins Hives     Can take with Benadryl    Valproic Acid Rash     .     PHYSICAL EXAM  VITAL SIGNS: /54   Pulse (!) 109   Temp 36.3 °C (97.4 °F)   Resp 16   Ht 1.626 m (5' 4\")   Wt 118 kg (260 lb)   SpO2 96%   BMI 44.63 kg/m²    Genl: F sitting in chair comfortably, speaking clearly, appears in no acute distress   Head: NC/AT   ENT: Mucous membranes slightly dry, posterior pharynx clear, uvula midline, nares patent bilaterally   Pulmonary: Lungs are clear to auscultation bilaterally  Chest: No TTP  CV:  RRR, no murmur appreciated, pulses 2+ in both upper and lower extremities,  Abdomen: obese, specific and inconsistent abdominal discomfort without localization or distention.  No rebound/guarding, no masses palpated, no HSM   : no CVA tenderness, non-specific suprapubic tenderness.  Exam shows small amount of brandon blood near the anal verge.  There is no rectal tears, no new or thrombosed external hemorrhoids.  Digital rectal exam is positive for single internal hemorrhoid with scant bleeding.  Musculoskeletal: Pain free ROM of the neck. Moving upper and lower extremities in spontaneous and coordinated fashion  Neuro: A&Ox4 (person, place, time, situation), speech fluent, no focal deficits appreciated, No cerebellar signs. Sensation is grossly intact in the distal upper and lower extremities.  5/5 strength in  and dorsiflexion/plantar flexion of the ankles  Skin: No rash or lesions.  No pallor or jaundice.  No cyanosis.  Warm and dry.     EKG/LABS  Labs Reviewed   COMP METABOLIC PANEL - Abnormal; Notable for the following components:       Result Value    Co2 17 (*)     Glucose 131 (*)     " ALT(SGPT) 94 (*)     Alkaline Phosphatase 111 (*)     All other components within normal limits   URINALYSIS - Abnormal; Notable for the following components:    Occult Blood Small (*)     All other components within normal limits   COD (ADULT)   CBC WITH DIFFERENTIAL   LIPASE   PROTHROMBIN TIME   APTT   ESTIMATED GFR   URINE MICROSCOPIC (W/UA)     RADIOLOGY/PROCEDURES   I have independently interpreted the diagnostic imaging associated with this visit and am waiting the final reading from the radiologist.   My preliminary interpretation is as follows: No acute cardiopulmonary process.  No air under the diaphragm.    Radiologist interpretation:  DX-CHEST-PORTABLE (1 VIEW)   Final Result      No acute cardiopulmonary abnormality.           COURSE & MEDICAL DECISION MAKING    ASSESSMENT, COURSE AND PLAN  Care Narrative: Presents the emergency room for symptoms as described above.  The patient is known for having some chronic abdominal discomfort, and occasional dehydration and arrives with more apprehension about persistent nauseousness with some slightly dry mucous membranes and slight increase in her heart rate but no hemodynamic instability and she has no localizing abdominal discomfort or distention.  Serial abdominal exams do not show any further localization, no rigidity, she does have more discomfort with suprapubic discomfort though she says this is pretty common.  Urinalysis is added to the initial triage workup that was ordered.  This did come back prior to me assessing the patient and I have reviewed the labs which showed no leukocytosis, no concerning anemia, no concerning interval electrolyte abnormalities and nonspecific ALT and alk phos elevation with no signs of hepatobiliary obstructive etiology.  No evidence of pancreatitis.  Patient had been seen in the hospital recently, has had multiple imagings both CT and pelvic ultrasound within the last several months as she has been seen on multitudes of  times.  There is mention of previous internal hemorrhoid and clinical exam was performed with a chaperone that shows single internal hemorrhoid with some bleeding.  No perirectal infectious etiology, no thrombosed hemorrhoids..    Patient will be treated for this and received some fluids as medications could be administered to help bridge from IV to oral p.o. trial.  There is no evidence of significant brandon from the GI system, this appears to be internal hemorrhoid with no hemodynamic instability or sequela.  She is not on medications for this and does have outpatient follow-up.    Alysis was delayed secondary to patient's complaints, she was able to provide a sample and thankfully there is no evidence of UTI, no clinical evidence of Parminder, no findings of stone pathology.  At this time patient is feeling improved, does not have intractable nausea vomiting, does not have critical dehydration and with no evidence of other hemodynamic instability or critical anemia do believe that treated conservatively for these with    Hydration: Based on the patient's presentation of Inability to take oral fluids the patient was given IV fluids. IV Hydration was used because oral hydration was not adequate alone. Upon recheck following hydration, the patient was transitioned to PO fluids.    DISPOSITION AND DISCUSSIONS  I have discussed management of the patient with the following physicians and ARIES's:  none    Discussion of management with other QHP or appropriate source(s): None     Escalation of care considered, and ultimately not performed:diagnostic imaging    Barriers to care at this time, including but not limited to: none.     Decision tools and prescription drugs considered including, but not limited to:  nausea medications/anusol .    FINAL DIAGNOSIS  1. Chronic abdominal pain    2. Hemorrhoids, unspecified hemorrhoid type    3. Rectal bleeding       Electronically signed by: Rahul Segura M.D., 8/15/2024 5:21 PM

## 2024-08-16 NOTE — ED NOTES
Pt educated regarding discharge instructions and demonstrated understanding. Pt ambulatory upon discharge with personal walker and does not appear to be in any distress at this time. Pt's discharge paperwork and belongings sent home with pt and family.

## 2024-08-16 NOTE — ED NOTES
Bedside report received from Valente STEVENS RN. Upon shift change pt is resting in bed with even and unlabored breaths, in no apparent distress. Pt is connected to monitoring devices and is on room air, tolerating well. IV infusion still flowing slowly as IV is positional. Family is at bedside. Will continue to monitor.

## 2024-08-16 NOTE — ED NOTES
Pt ambulatory to bathroom with personal cane with standby assistance. Urine sample collected and sent to lab.

## 2024-08-19 ENCOUNTER — HOSPITAL ENCOUNTER (OUTPATIENT)
Dept: RADIOLOGY | Facility: MEDICAL CENTER | Age: 34
End: 2024-08-19
Attending: PHYSICIAN ASSISTANT
Payer: MEDICARE

## 2024-08-19 DIAGNOSIS — M25.551 RIGHT HIP PAIN: ICD-10-CM

## 2024-08-19 PROCEDURE — 73721 MRI JNT OF LWR EXTRE W/O DYE: CPT

## 2024-08-24 DIAGNOSIS — G90.A POSTURAL ORTHOSTATIC TACHYCARDIA SYNDROME: ICD-10-CM

## 2024-08-26 NOTE — TELEPHONE ENCOUNTER
Is the patient due for a refill? No    Was the patient seen the past year? Yes    Date of last office visit: 7.23.2024    Does the patient have an upcoming appointment?  Yes   If yes, When? 9.19.2024    Provider to refill:[facility]    Does the patient have longterm Plus and need 100-day supply? (This applies to ALL medications) Patient does not have SCP

## 2024-08-27 RX ORDER — IVABRADINE 7.5 MG/1
7.5 TABLET, FILM COATED ORAL 2 TIMES DAILY WITH MEALS
Qty: 60 TABLET | Refills: 3 | Status: SHIPPED
Start: 2024-08-27 | End: 2024-08-29 | Stop reason: SDUPTHER

## 2024-08-28 ENCOUNTER — HOSPITAL ENCOUNTER (OUTPATIENT)
Dept: RADIOLOGY | Facility: MEDICAL CENTER | Age: 34
End: 2024-08-28
Attending: FAMILY MEDICINE
Payer: MEDICARE

## 2024-08-28 DIAGNOSIS — I87.2 CHRONIC VENOUS INSUFFICIENCY: ICD-10-CM

## 2024-08-28 PROCEDURE — 93970 EXTREMITY STUDY: CPT

## 2024-08-29 ENCOUNTER — TELEPHONE (OUTPATIENT)
Dept: CARDIOLOGY | Facility: MEDICAL CENTER | Age: 34
End: 2024-08-29
Payer: MEDICARE

## 2024-08-29 ENCOUNTER — PATIENT MESSAGE (OUTPATIENT)
Dept: CARDIOLOGY | Facility: MEDICAL CENTER | Age: 34
End: 2024-08-29
Payer: MEDICARE

## 2024-08-29 DIAGNOSIS — G90.A POSTURAL ORTHOSTATIC TACHYCARDIA SYNDROME: ICD-10-CM

## 2024-08-29 RX ORDER — IVABRADINE 7.5 MG/1
7.5 TABLET, FILM COATED ORAL 2 TIMES DAILY WITH MEALS
Qty: 60 TABLET | Refills: 3 | Status: SHIPPED | OUTPATIENT
Start: 2024-08-29

## 2024-08-29 RX ORDER — IVABRADINE 7.5 MG/1
7.5 TABLET, FILM COATED ORAL 2 TIMES DAILY WITH MEALS
Qty: 60 TABLET | Refills: 3 | Status: SHIPPED | OUTPATIENT
Start: 2024-08-29 | End: 2024-08-29 | Stop reason: SDUPTHER

## 2024-08-29 NOTE — TELEPHONE ENCOUNTER
I actually just contacted the patient and she wants this RX sent to [facility] so I can get it delivered to the patient as she states she is starting to not feel well without this medication and I show it goes through fine and [facility] can not get the voicemail.

## 2024-08-29 NOTE — PATIENT COMMUNICATION
Pharmacy coordinators :I think patient will still need Prior Auth, I assume one hasn't been ran with the new insurance?    [employer] PDP Plan  Member ID :[id number]  Plan #:[id number]

## 2024-08-29 NOTE — TELEPHONE ENCOUNTER
Kayla Dunn  You8 minutes ago (2:25 PM)     SF  Great thank you had to order this medication for a delivery for tomorrow patient is aware

## 2024-08-29 NOTE — TELEPHONE ENCOUNTER
I have contacted [email or web address] and left a voicemail mutliple times being you can not contact a person anymore this prescription runs through both insurances for $0 for 90days the pharmacy just needs to run for brand not generic, which I believe is what they are running it for because that is the PA they keep sending is for generic.

## 2024-08-30 ENCOUNTER — PHARMACY VISIT (OUTPATIENT)
Dept: PHARMACY | Facility: MEDICAL CENTER | Age: 34
End: 2024-08-30
Payer: COMMERCIAL

## 2024-08-30 PROCEDURE — RXMED WILLOW AMBULATORY MEDICATION CHARGE

## 2024-09-02 ENCOUNTER — APPOINTMENT (OUTPATIENT)
Dept: RADIOLOGY | Facility: MEDICAL CENTER | Age: 34
End: 2024-09-02
Attending: EMERGENCY MEDICINE
Payer: MEDICARE

## 2024-09-02 ENCOUNTER — HOSPITAL ENCOUNTER (EMERGENCY)
Facility: MEDICAL CENTER | Age: 34
End: 2024-09-02
Attending: EMERGENCY MEDICINE
Payer: MEDICARE

## 2024-09-02 ENCOUNTER — OFFICE VISIT (OUTPATIENT)
Dept: URGENT CARE | Facility: CLINIC | Age: 34
End: 2024-09-02
Payer: MEDICARE

## 2024-09-02 VITALS
SYSTOLIC BLOOD PRESSURE: 117 MMHG | OXYGEN SATURATION: 96 % | BODY MASS INDEX: 43.36 KG/M2 | WEIGHT: 254 LBS | TEMPERATURE: 97.6 F | DIASTOLIC BLOOD PRESSURE: 67 MMHG | HEART RATE: 72 BPM | HEIGHT: 64 IN | RESPIRATION RATE: 19 BRPM

## 2024-09-02 VITALS
HEIGHT: 64 IN | SYSTOLIC BLOOD PRESSURE: 126 MMHG | HEART RATE: 92 BPM | RESPIRATION RATE: 23 BRPM | OXYGEN SATURATION: 97 % | BODY MASS INDEX: 43.4 KG/M2 | DIASTOLIC BLOOD PRESSURE: 78 MMHG | WEIGHT: 254.2 LBS | TEMPERATURE: 98.1 F

## 2024-09-02 DIAGNOSIS — R00.2 PALPITATIONS: ICD-10-CM

## 2024-09-02 DIAGNOSIS — R06.02 SHORTNESS OF BREATH: ICD-10-CM

## 2024-09-02 DIAGNOSIS — R00.0 TACHYCARDIA: ICD-10-CM

## 2024-09-02 LAB
ALBUMIN SERPL BCP-MCNC: 4.5 G/DL (ref 3.2–4.9)
ALBUMIN/GLOB SERPL: 2.1 G/DL
ALP SERPL-CCNC: 75 U/L (ref 30–99)
ALT SERPL-CCNC: 37 U/L (ref 2–50)
ANION GAP SERPL CALC-SCNC: 15 MMOL/L (ref 7–16)
AST SERPL-CCNC: 20 U/L (ref 12–45)
BASOPHILS # BLD AUTO: 1.1 % (ref 0–1.8)
BASOPHILS # BLD: 0.06 K/UL (ref 0–0.12)
BILIRUB SERPL-MCNC: 0.5 MG/DL (ref 0.1–1.5)
BUN SERPL-MCNC: 16 MG/DL (ref 8–22)
CALCIUM ALBUM COR SERPL-MCNC: 9 MG/DL (ref 8.5–10.5)
CALCIUM SERPL-MCNC: 9.4 MG/DL (ref 8.5–10.5)
CHLORIDE SERPL-SCNC: 108 MMOL/L (ref 96–112)
CO2 SERPL-SCNC: 18 MMOL/L (ref 20–33)
CREAT SERPL-MCNC: 0.88 MG/DL (ref 0.5–1.4)
D DIMER PPP IA.FEU-MCNC: <0.27 UG/ML (FEU) (ref 0–0.5)
EKG IMPRESSION: NORMAL
EOSINOPHIL # BLD AUTO: 0.21 K/UL (ref 0–0.51)
EOSINOPHIL NFR BLD: 3.8 % (ref 0–6.9)
ERYTHROCYTE [DISTWIDTH] IN BLOOD BY AUTOMATED COUNT: 42.3 FL (ref 35.9–50)
GFR SERPLBLD CREATININE-BSD FMLA CKD-EPI: 88 ML/MIN/1.73 M 2
GLOBULIN SER CALC-MCNC: 2.1 G/DL (ref 1.9–3.5)
GLUCOSE SERPL-MCNC: 139 MG/DL (ref 65–99)
HCT VFR BLD AUTO: 42.2 % (ref 37–47)
HGB BLD-MCNC: 14.4 G/DL (ref 12–16)
IMM GRANULOCYTES # BLD AUTO: 0.03 K/UL (ref 0–0.11)
IMM GRANULOCYTES NFR BLD AUTO: 0.5 % (ref 0–0.9)
LYMPHOCYTES # BLD AUTO: 1.5 K/UL (ref 1–4.8)
LYMPHOCYTES NFR BLD: 26.9 % (ref 22–41)
MCH RBC QN AUTO: 31.2 PG (ref 27–33)
MCHC RBC AUTO-ENTMCNC: 34.1 G/DL (ref 32.2–35.5)
MCV RBC AUTO: 91.3 FL (ref 81.4–97.8)
MONOCYTES # BLD AUTO: 0.66 K/UL (ref 0–0.85)
MONOCYTES NFR BLD AUTO: 11.8 % (ref 0–13.4)
NEUTROPHILS # BLD AUTO: 3.12 K/UL (ref 1.82–7.42)
NEUTROPHILS NFR BLD: 55.9 % (ref 44–72)
NRBC # BLD AUTO: 0 K/UL
NRBC BLD-RTO: 0 /100 WBC (ref 0–0.2)
PLATELET # BLD AUTO: 208 K/UL (ref 164–446)
PMV BLD AUTO: 11 FL (ref 9–12.9)
POTASSIUM SERPL-SCNC: 4.1 MMOL/L (ref 3.6–5.5)
PROT SERPL-MCNC: 6.6 G/DL (ref 6–8.2)
RBC # BLD AUTO: 4.62 M/UL (ref 4.2–5.4)
SODIUM SERPL-SCNC: 141 MMOL/L (ref 135–145)
TROPONIN T SERPL-MCNC: 7 NG/L (ref 6–19)
TSH SERPL DL<=0.005 MIU/L-ACNC: 2.05 UIU/ML (ref 0.38–5.33)
WBC # BLD AUTO: 5.6 K/UL (ref 4.8–10.8)

## 2024-09-02 PROCEDURE — 85025 COMPLETE CBC W/AUTO DIFF WBC: CPT

## 2024-09-02 PROCEDURE — 84443 ASSAY THYROID STIM HORMONE: CPT

## 2024-09-02 PROCEDURE — 93005 ELECTROCARDIOGRAM TRACING: CPT

## 2024-09-02 PROCEDURE — 85379 FIBRIN DEGRADATION QUANT: CPT

## 2024-09-02 PROCEDURE — 84484 ASSAY OF TROPONIN QUANT: CPT

## 2024-09-02 PROCEDURE — 99215 OFFICE O/P EST HI 40 MIN: CPT | Performed by: NURSE PRACTITIONER

## 2024-09-02 PROCEDURE — 93000 ELECTROCARDIOGRAM COMPLETE: CPT | Performed by: NURSE PRACTITIONER

## 2024-09-02 PROCEDURE — 71045 X-RAY EXAM CHEST 1 VIEW: CPT

## 2024-09-02 PROCEDURE — 80053 COMPREHEN METABOLIC PANEL: CPT

## 2024-09-02 PROCEDURE — 3078F DIAST BP <80 MM HG: CPT | Performed by: NURSE PRACTITIONER

## 2024-09-02 PROCEDURE — 99284 EMERGENCY DEPT VISIT MOD MDM: CPT

## 2024-09-02 PROCEDURE — 36415 COLL VENOUS BLD VENIPUNCTURE: CPT

## 2024-09-02 PROCEDURE — 3074F SYST BP LT 130 MM HG: CPT | Performed by: NURSE PRACTITIONER

## 2024-09-02 PROCEDURE — 93005 ELECTROCARDIOGRAM TRACING: CPT | Performed by: EMERGENCY MEDICINE

## 2024-09-02 ASSESSMENT — FIBROSIS 4 INDEX
FIB4 SCORE: 0.44
FIB4 SCORE: 0.44

## 2024-09-02 NOTE — PROGRESS NOTES
"Verbal consent was acquired by the patient to use [employer] ambient listening note generation during this visit.    Subjective:     HPI:   History of Present Illness  The patient is a 33-year-old female who presents for evaluation of rapid heart rate and shortness of breath.     She reports experiencing an increase in her heart rate from a normal range to 130 beats per minute over the past few days, which she finds concerning given her history of ablation. She notes that even mild physical activity, such as walking, causes her heart rate to spike. This has been ongoing for three days. Her heart rate has varied between 70 and 130 beats per minute.    She is currently on two heart medications. There was an issue with her insurance not covering her heart medication, but this has since been resolved, and she has resumed her medication for the past three days.    She also mentions feeling fatigued and short of breath. Her oxygen levels have fluctuated between 92 percent and 97 percent. She requests an EKG to ensure her heart rhythm is stable.      Objective:     Exam:  /78   Pulse 92   Temp 36.7 °C (98.1 °F) (Temporal)   Resp (!) 23   Ht 1.626 m (5' 4\")   Wt 115 kg (254 lb 3.2 oz)   SpO2 97%   BMI 43.63 kg/m²  Body mass index is 43.63 kg/m².    Physical Exam  Vitals and nursing note reviewed.   Constitutional:       General: She is not in acute distress.     Appearance: Normal appearance. She is not ill-appearing.   HENT:      Head: Normocephalic and atraumatic.      Nose: Nose normal.      Mouth/Throat:      Mouth: Mucous membranes are moist.   Cardiovascular:      Rate and Rhythm: Normal rate and regular rhythm.      Pulses: Normal pulses.      Heart sounds: Normal heart sounds.   Pulmonary:      Effort: Pulmonary effort is normal.      Breath sounds: No wheezing, rhonchi or rales.   Musculoskeletal:      Cervical back: Normal range of motion and neck supple.   Neurological:      Mental Status: She is " alert.       EKG Interpretation   Ordered and interpreted by EDD Jay  Rhythm: normal sinus   Rate: 87 normal   Axis: normal   Ectopy: none   Conduction: normal   ST Segments: no acute change   T Waves: no acute change   Q Waves: none   Clinical Impression: no acute changes and normal EKG - no acute changes since EKG performed 8/12/2024.       Assessment & Plan:     1. Shortness of breath  EKG - Clinic Performed      2. Tachycardia  EKG - Clinic Performed          Assessment & Plan  1. Tachycardia and shortness of breath.   She reports experiencing rapid heart rates up to 130 beats per minute over the past 3 days, despite being on two heart medications and having a history of ablation. Symptoms include shortness of breath, exhaustion, and fluctuating oxygen levels. Patient appears well in clinic with stable vital signs and normal clinical exam. She denies chest pain, but reports significant shortness of breath and fatigue.  An EKG performed today was within normal limits, and had no changes compared to prior EKG on 8/12/2024.  She was advised to seek immediate medical attention at the ER for her persistent and worsening symptoms, as Urgent Care cannot provide the necessary labs and diagnostic monitoring that patient may need.                PER [place].      Please note that this dictation was created using voice recognition software. I have made every reasonable attempt to correct obvious errors, but I expect that there are errors of grammar and possibly content that I did not discover before finalizing the note.

## 2024-09-02 NOTE — DISCHARGE INSTRUCTIONS
Activity as tolerated, please call your cardiologist office tomorrow morning to make a follow-up appointment.  A referral has been placed from the ER if you mention that to the MD who will hopefully get you in sooner.  Return for change or worsening symptoms.

## 2024-09-02 NOTE — ED TRIAGE NOTES
"Chief Complaint   Patient presents with    Palpitations     Heart rate going from  BPM.    Shortness of Breath     Accompanies palpitations    Sent by MD     Sent by      Pt sent by  today after seeking treatment for fluctuating heart rate and SOB x3 days.   Pt has history of arrhythmias and tachycardia.     EKG completed in triage.    Pt is alert and oriented, speaking in full sentences, follows commands and responds appropriately to questions. Resperations are even and unlabored.      Pt placed in lobby. Pt educated on triage process. Pt encouraged to alert staff for any changes.     Patient and staff wearing appropriate PPE.    BP (!) 149/98   Pulse (!) 102   Temp 36.3 °C (97.4 °F) (Temporal)   Resp 18   Ht 1.626 m (5' 4\")   Wt 115 kg (254 lb)   SpO2 97%      "

## 2024-09-02 NOTE — ED PROVIDER NOTES
"ER Provider Note    Scribed for Vikram Dunlap D.O. by Nilsa Vigil. 9/2/2024  11:35 AM    Primary Care Provider: Torres Brody M.D.    CHIEF COMPLAINT  Chief Complaint   Patient presents with    Palpitations     Heart rate going from  BPM.    Shortness of Breath     Accompanies palpitations    Sent by MD     Sent by      HPI/ROS  Kristin Balderrama is a 33 y.o. female with a history of diabetes, Hashimoto's, and POTS who presents to the Emergency Department for palpations onset 3 days ago. Patient notes her heart rate has been ranging between 70 - 130. She reports prior ablation for SVT. Patient states she has been out of Corlanor for a few days and then recently started it back up. She reports associated shortness of breath, noting her oxygen saturation has been ranging from 93-98. She notes she may have hashimoto's.     ROS as per HPI.    PAST MEDICAL HISTORY  Past Medical History:   Diagnosis Date    Abdominal pain     Anginal syndrome     random chest pain especially with tachycardia    Apnea, sleep     Arrhythmia     \"sinus tachycardia\", cariologist, Dr. Kumar; ablation 2/2016    Arthritis     osteo    ASTHMA     when around smoke    Back pain     Borderline personality disorder (HCC)     Breath shortness     with tachycardia    Bronchitis 02/08/2022    Last time was 12/21    Cardiac arrhythmia     Chickenpox     Chronic UTI 09/18/2010    Cough     Daytime sleepiness     Dental disorder 03/08/2021    infected tooth    Depression     Diabetes (HCC)     Diarrhea     incontinece    Disorder of thyroid     Hashimoto's    Fall     Fatigue     Frequent headaches     Gasping for breath     Gynecological disorder     PCOS    Headache(784.0)     Heart burn     Heart murmur     History of falling     Indigestion     Migraine     Mitochondrial disease (HCC)     Multiple personality disorder (HCC)     Nausea     Obesity     Other fatigue 06/29/2020    Pain 08/15/2012    back, 7/10    Painful joint     PCOS " "(polycystic ovarian syndrome)     Pneumonia 2012 12/2020    POTS (postural orthostatic tachycardia syndrome)     Psychosis (HCC)     Ringing in ears     Scoliosis     Shortness of breath     O2 as needed    Sinus tachycardia 10/31/2013    Sleep apnea     CPAP \"pulmonary doctor took me off mid year 2016\"    Snoring     Supraventricular tachycardia (HCC) 4/10/2019    Tonsillitis     Transverse myelitis (HCC)     2/8/22: Per pt: not anymore    Tuberculosis     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    Urinary bladder disorder     Suprapubic cath. 2/8/22: Not anymore.     Urinary incontinence     Weakness     Wears glasses      SURGICAL HISTORY  Past Surgical History:   Procedure Laterality Date    AL CYSTOSCOPY,INSERT URETERAL STENT Right 2/12/2024    Procedure: CYSTOSCOPY, WITH RIGHT URETEROSCOPY, WITH LITHOTRIPSY, WITH INSERTION OF RIGHT URETERAL STENT;  Surgeon: Josafat Roberson M.D.;  Location: SURGERY [facility];  Service: Urology    AL CYSTO/URETERO/PYELOSCOPY, DX Right 2/12/2024    Procedure: URETEROSCOPY;  Surgeon: Josafat Roberson M.D.;  Location: SURGERY [facility];  Service: Urology    LASERTRIPSY Right 2/12/2024    Procedure: LITHOTRIPSY, USING LASER;  Surgeon: Josafat Roberson M.D.;  Location: SURGERY [facility];  Service: Urology    INGUINAL HERNIA LAPAROSCOPIC Right 07/21/2023    Procedure: LAPAROSCOPIC RIGHT INGUINAL HERNIA REPAIR WITH MESH;  Surgeon: Joe Noyola M.D.;  Location: SURGERY [facility];  Service: General    HERNIA REPAIR Right 07/21/2023    PB PERCUT FIX PBOX/NECK FEMUR FX Left 01/28/2023    Procedure: FIXATION, HIP, USING CANNULATED SCREW;  Surgeon: Noman Abdul M.D.;  Location: SURGERY [facility];  Service: Orthopedics    AL LAP,DIAGNOSTIC ABDOMEN  02/14/2022    Procedure: LAPAROSCOPY;  Surgeon: Seamus Pisano M.D.;  Location: SURGERY SAME DAY [facility];  Service: Gynecology    OVARIAN CYSTECTOMY Right 02/14/2022    Procedure: EXCISION, CYST, OVARY;  Surgeon: Seamus LIN" RUFUS Pisano;  Location: SURGERY SAME DAY [facility];  Service: Gynecology    BOWEL STIMULATOR INSERTION  03/10/2021    Procedure: INSERTION, ELECTRODE LEADS AND PULSE GENERATOR, NEUROSTIMULATOR, SACRAL - REMOVAL OF INTERSTIM WITH REPLACEMENT OF SACRAL NEUROMODULATION DEVICE;  Surgeon: Joe Noyola M.D.;  Location: SURGERY [facility];  Service: General    MUSCLE BIOPSY Right 01/26/2017    Procedure: MUSCLE BIOPSY - THIGH;  Surgeon: Isidro Vigil M.D.;  Location: SURGERY [facility];  Service:     GASTROSCOPY WITH BALLOON DILATATION N/A 01/04/2017    Procedure: GASTROSCOPY WITH DILATATION;  Surgeon: Torres Vargas M.D.;  Location: SURGERY [facility];  Service:     BOWEL STIMULATOR INSERTION  07/13/2016    Procedure: BOWEL STIMULATOR INSERTION FOR PERMANENT INTERSTIM SACRAL IMPLANT;  Surgeon: Joe Noyola M.D.;  Location: SURGERY [facility];  Service:     RECOVERY  01/27/2016    Procedure: CATH LAB EP STUDY WITH SINUS NODE MODIFICATION LA;  Surgeon: Recoveryonly Surgery;  Location: SURGERY PRE-POST PROC UNIT [facility];  Service:     OTHER CARDIAC SURGERY  01/2016    cardiac ablation    NEURO DEST FACET L/S W/IG SNGL  04/21/2015    Performed by Reza Tabor at SURGERY [facility]    LUMBAR FUSION ANTERIOR  08/21/2012    Performed by SUSIE SAWANT at SURGERY [facility]    ALYSSA BY LAPAROSCOPY  08/29/2010    Performed by SHAYY JOHNS at SURGERY [facility]    LAMINOTOMY      OTHER ABDOMINAL SURGERY      TONSILLECTOMY      tonsillectomy     FAMILY HISTORY  Family History   Problem Relation Age of Onset    Hypertension Mother     Sleep Apnea Mother     Heart Disease Mother     Other Mother         hypothryod    Hypertension Maternal Uncle     Heart Disease Maternal Grandmother     Hypertension Maternal Grandmother     No Known Problems Sister     Other Sister         Narcolepsy;fibromyalsia;bone;nerve    Genitourinary () Problems Sister         endometriosis     SOCIAL HISTORY   reports  that she has never smoked. She has never used smokeless tobacco. She reports that she does not drink alcohol and does not use drugs.    CURRENT MEDICATIONS  Discharge Medication List as of 9/2/2024  1:55 PM        CONTINUE these medications which have NOT CHANGED    Details   ivabradine (CORLANOR) 7.5 MG Tab tablet Take 1 Tablet by mouth 2 times a day with meals.holdDisp-60 Tablet, R-3, Normal      diphenhydrAMINE (BENADRYL) 25 MG Tab Take 25-50 mg by mouth 2 times a day. Scheduled    25 mg = AM  50 MG = PM, Historical Med      Melatonin 10 MG Tab Take 10 mg by mouth at bedtime., Historical Med      sumatriptan (IMITREX) 5 MG/ACT nasal spray Administer 1 Spray into affected nostril(S) as needed. Indications: Migraine Headache, Historical Med      topiramate (TOPAMAX) 50 MG tablet Take 50 mg by mouth 2 times a day., Historical Med      metoprolol SR (TOPROL XL) 25 MG TABLET SR 24 HR TAKE 1 TABLET BY MOUTH EVERY DAY, Disp-90 Tablet, R-3, Normal      spironolactone (ALDACTONE) 25 MG Tab 25 mg every day., Historical Med      ibuprofen (MOTRIN) 800 MG Tab Take 1 Tablet by mouth every 8 hours as needed for Mild Pain., Disp-30 Tablet, R-0, Normal      loperamide (IMODIUM) 2 MG Cap Take 1 Capsule by mouth 4 times a day as needed for Diarrhea., Disp-30 Capsule, R-0, Normal      adalimumab (HUMIRA) 80 MG/0.8ML injection Inject 80 mg under the skin every 14 days. Last injection was on 3/14/2024, Historical Med      amLODIPine (NORVASC) 5 MG Tab Take 5 mg by mouth every day., Historical Med      Rimegepant Sulfate (NURTEC) 75 MG TABLET DISPERSIBLE Take 75 mg by mouth 1 time a day as needed (migraine)., Historical Med      gabapentin (NEURONTIN) 400 MG Cap Take 1,200 mg by mouth 3 times a day. 3 capsules=1200mg, Historical Med      omeprazole (PRILOSEC) 20 MG delayed-release capsule Take 20 mg by mouth 2 times a day., Historical Med      lamoTRIgine (LAMICTAL) 25 MG Tab Take 50 mg by mouth 2 times a day. 25 mg x 2 tablets = 50  "mg, Historical Med      Galcanezumab-gnlm (EMGALITY) 120 MG/ML Solution Auto-injector Inject 120 mg under the skin every 30 (thirty) days. On the 27th of every month, last dose was taken on 2/27/2024, Historical Med      ziprasidone (GEODON) 40 MG Cap Take 1 capsule by mouth at bedtime., Disp-30 Capsule, R-2, Normal      etonogestrel (NEXPLANON) 68 MG Implant implant Inject 68 mg under the skin see administration instructions. Implant in left arm  Every 3 years to change, thinks it was placed 2021, Historical Med           ALLERGIES  Cefdinir, Depakote [divalproex sodium], Depakote [divalproex sodium], Doxycycline, Montelukast, Montelukast [singulair], Vancomycin, Wound dressing adhesive, Amitriptyline, Amoxicillin, Aripiprazole, Aripiprazole [abilify], Clindamycin, Erythromycin, Flomax [tamsulosin hydrochloride], Hydromorphone, Levaquin, Metformin, Sulfa drugs, Tamsulosin, Tape, Sulfamethoxazole w-trimethoprim, Ampicillin, Ciprofloxacin, Keflex, Levofloxacin, Metronidazole, Penicillins, and Valproic acid    PHYSICAL EXAM  BP (!) 149/98   Pulse (!) 102   Temp 36.3 °C (97.4 °F) (Temporal)   Resp 18   Ht 1.626 m (5' 4\")   Wt 115 kg (254 lb)   SpO2 97%   BMI 43.60 kg/m²     General: No acute distress. BMI greater than 30.   HENT: Normocephalic, Mucus membranes are moist.   Chest: Lungs have even and unlabored respirations, Clear to auscultation.   Cardiovascular: Regular rate and regular rhythm, No peripheral cyanosis.  Abdomen: Non distended.  Neuro: Awake, Conversive, Able to relay recent events.  Psychiatric: Calm and cooperative. Mildly anxious     INITIAL ASSESSMENT  Patient has symptoms as described, currently normal pulse oxymetry on room air. Not tachycardic. Blood pressure is mildly elevated. Will evaluate for heart injury, D dimer to exclude pulmonary embolism and evaluate thyroid.     ED Observation Status? No; Patient does not meet criteria for ED Observation.     DIAGNOSTIC STUDIES  Labs:   Results " for orders placed or performed during the hospital encounter of 09/02/24   CBC WITH DIFFERENTIAL   Result Value Ref Range    WBC 5.6 4.8 - 10.8 K/uL    RBC 4.62 4.20 - 5.40 M/uL    Hemoglobin 14.4 12.0 - 16.0 g/dL    Hematocrit 42.2 37.0 - 47.0 %    MCV 91.3 81.4 - 97.8 fL    MCH 31.2 27.0 - 33.0 pg    MCHC 34.1 32.2 - 35.5 g/dL    RDW 42.3 35.9 - 50.0 fL    Platelet Count 208 164 - 446 K/uL    MPV 11.0 9.0 - 12.9 fL    Neutrophils-Polys 55.90 44.00 - 72.00 %    Lymphocytes 26.90 22.00 - 41.00 %    Monocytes 11.80 0.00 - 13.40 %    Eosinophils 3.80 0.00 - 6.90 %    Basophils 1.10 0.00 - 1.80 %    Immature Granulocytes 0.50 0.00 - 0.90 %    Nucleated RBC 0.00 0.00 - 0.20 /100 WBC    Neutrophils (Absolute) 3.12 1.82 - 7.42 K/uL    Lymphs (Absolute) 1.50 1.00 - 4.80 K/uL    Monos (Absolute) 0.66 0.00 - 0.85 K/uL    Eos (Absolute) 0.21 0.00 - 0.51 K/uL    Baso (Absolute) 0.06 0.00 - 0.12 K/uL    Immature Granulocytes (abs) 0.03 0.00 - 0.11 K/uL    NRBC (Absolute) 0.00 K/uL   COMP METABOLIC PANEL   Result Value Ref Range    Sodium 141 135 - 145 mmol/L    Potassium 4.1 3.6 - 5.5 mmol/L    Chloride 108 96 - 112 mmol/L    Co2 18 (L) 20 - 33 mmol/L    Anion Gap 15.0 7.0 - 16.0    Glucose 139 (H) 65 - 99 mg/dL    Bun 16 8 - 22 mg/dL    Creatinine 0.88 0.50 - 1.40 mg/dL    Calcium 9.4 8.5 - 10.5 mg/dL    Correct Calcium 9.0 8.5 - 10.5 mg/dL    AST(SGOT) 20 12 - 45 U/L    ALT(SGPT) 37 2 - 50 U/L    Alkaline Phosphatase 75 30 - 99 U/L    Total Bilirubin 0.5 0.1 - 1.5 mg/dL    Albumin 4.5 3.2 - 4.9 g/dL    Total Protein 6.6 6.0 - 8.2 g/dL    Globulin 2.1 1.9 - 3.5 g/dL    A-G Ratio 2.1 g/dL   TROPONIN   Result Value Ref Range    Troponin T 7 6 - 19 ng/L   D-DIMER   Result Value Ref Range    D-Dimer <0.27 0.00 - 0.50 ug/mL (FEU)   TSH WITH REFLEX TO FT4   Result Value Ref Range    TSH 2.050 0.380 - 5.330 uIU/mL   ESTIMATED GFR   Result Value Ref Range    GFR (CKD-EPI) 88 >60 mL/min/1.73 m 2   EKG   Result Value Ref Range    Report        [facility] Emergency Dept.    Test Date:  2024  Pt Name:    HARLEY DE LA CRUZ              Department: ER  MRN:        [id number]                      Room:  Gender:     Female                       Technician: [id number]  :        1990                   Requested By:ER TRIAGE PROTOCOL  Order #:    [id number]                    Reading MD: LC GOMEZ D.O.    Measurements  Intervals                                Axis  Rate:       83                           P:          42  MN:         135                          QRS:        4  QRSD:       96                           T:          5  QT:         420  QTc:        494    Interpretive Statements  Sinus rhythm  Borderline T abnormalities, anterior leads  Borderline prolonged QT interval  Compared to ECG 2024 06:16:07  No significant changes  Electronically Signed On 2024 13:51:26 PDT by LC GOMEZ D.O.       *Note: Due to a large number of results and/or encounters for the requested time period, some results have not been displayed. A complete set of results can be found in Results Review.     EKG:   I have independently interpreted the above EKG.    Radiology:   The attending emergency physician has independently interpreted the diagnostic imaging associated with this visit and am waiting the final reading from the radiologist.   Preliminary interpretation is as follows: No acute abnormalities   Radiologist interpretation:   DX-CHEST-PORTABLE (1 VIEW)   Final Result      Negative single view of the chest.        COURSE & MEDICAL DECISION MAKING     COURSE AND PLAN  11:36 AM - Patient was seen and evaluated at bedside. Patient presents to the ED for palpitations.  After my exam, I discussed with the patient the plan of care, which includes obtaining lab work for further evaluation. Patient understands and verbalizes agreement to plan of care. Ordered CBC w diff, CMP, troponin, D Dimer, TSH w reflex and EKG to  evaluate.     12:56 PM - Patient was reevaluated at bedside.     1:48 PM - Patient was reevaluated at bedside. Patient has been in sinus rhythm and controlled rate. Screening for cardiac, PE and thyroid are negative. Referral will be placed to cardiology. I then informed the patient of my plan for discharge, which includes strict return precautions for any new or worsening symptoms. Patient understands and verbalizes agreement to plan of care. Patient is comfortable going home at this time.     ED Summary: This patient having episodes of transient tachycardia, she feels worn out.  She has a history of SVT and has had ablations.  Her evaluation shows sinus rhythm, no tachycardia, there is no signs of PE and her D-dimer is negative.  Due to her transient tachycardia TSH was evaluated and this is normal also.  She has remained in a sinus rhythm without tachycardia here there is no indication for admission at this time she is stable for discharge home.    DISPOSITION AND DISCUSSIONS  The patient will return for new or worsening symptoms and is stable at the time of discharge.    DISPOSITION:  Patient will be discharged home in stable condition.    FOLLOW UP:  [facility]  [address] [place] [id number]  [phone]  In 1 day      FINAL DIAGNOSIS  1. Shortness of breath    2. Palpitations      INilas (Carolibsadie), am scribing for, and in the presence of, Vikram Dunlap D.O..    Electronically signed by: Nilsa Vigil (Carolibsadie), 9/2/2024    IVikram D.O. personally performed the services described in this documentation, as scribed by Nilsa Vigil in my presence, and it is both accurate and complete.     The note accurately reflects work and decisions made by me.  Vikram Dunlap D.O.  9/2/2024  3:04 PM

## 2024-09-02 NOTE — ED NOTES
Break RN: Pt ambulated to and from restroom with use of home cane.  Pt winded when she returned, otherwise vitals remain stable. ERP at bedside.

## 2024-09-02 NOTE — ED NOTES
Pt stable for discharge. Pt educated and reviewed discharge instructions with RN. Pt verbalized understanding & all questions were answered. Pt AoX 4. Pt ambulated independently utilizing cane with balanced and steady gait out of the ED with all belongings. Pt encouraged to come back if symptoms worsen.

## 2024-09-02 NOTE — ED NOTES
This RN agrees with triage note. Patient connected to continuous monitor with call light and personal belongings within reach. Ebonie locked and in the lowest position.     Pt A&O x 4 stable on RA

## 2024-09-03 ENCOUNTER — TELEPHONE (OUTPATIENT)
Dept: VASCULAR LAB | Facility: MEDICAL CENTER | Age: 34
End: 2024-09-03
Payer: MEDICARE

## 2024-09-03 PROCEDURE — RXMED WILLOW AMBULATORY MEDICATION CHARGE: Performed by: NURSE PRACTITIONER

## 2024-09-04 ENCOUNTER — OFFICE VISIT (OUTPATIENT)
Dept: CARDIOLOGY | Facility: PHYSICIAN GROUP | Age: 34
End: 2024-09-04
Attending: EMERGENCY MEDICINE
Payer: MEDICARE

## 2024-09-04 VITALS
BODY MASS INDEX: 44.04 KG/M2 | HEIGHT: 64 IN | SYSTOLIC BLOOD PRESSURE: 124 MMHG | WEIGHT: 257.94 LBS | HEART RATE: 82 BPM | OXYGEN SATURATION: 96 % | DIASTOLIC BLOOD PRESSURE: 88 MMHG | RESPIRATION RATE: 16 BRPM

## 2024-09-04 DIAGNOSIS — I47.11 INAPPROPRIATE SINUS TACHYCARDIA (HCC): ICD-10-CM

## 2024-09-04 DIAGNOSIS — I47.10 SVT (SUPRAVENTRICULAR TACHYCARDIA) (HCC): ICD-10-CM

## 2024-09-04 DIAGNOSIS — G90.A POSTURAL ORTHOSTATIC TACHYCARDIA SYNDROME: ICD-10-CM

## 2024-09-04 DIAGNOSIS — J45.40 MODERATE PERSISTENT ASTHMA WITHOUT COMPLICATION: ICD-10-CM

## 2024-09-04 PROBLEM — K59.1 FUNCTIONAL DIARRHEA: Status: RESOLVED | Noted: 2018-01-05 | Resolved: 2024-09-04

## 2024-09-04 PROBLEM — E87.20 METABOLIC ACIDOSIS, NAG, BICARBONATE LOSSES: Status: RESOLVED | Noted: 2024-08-11 | Resolved: 2024-09-04

## 2024-09-04 PROBLEM — N39.0 UTI (URINARY TRACT INFECTION): Status: RESOLVED | Noted: 2020-10-11 | Resolved: 2024-09-04

## 2024-09-04 PROBLEM — K62.5 BLOOD PER RECTUM: Status: RESOLVED | Noted: 2021-04-15 | Resolved: 2024-09-04

## 2024-09-04 PROCEDURE — 3079F DIAST BP 80-89 MM HG: CPT | Performed by: NURSE PRACTITIONER

## 2024-09-04 PROCEDURE — 3074F SYST BP LT 130 MM HG: CPT | Performed by: NURSE PRACTITIONER

## 2024-09-04 PROCEDURE — 99214 OFFICE O/P EST MOD 30 MIN: CPT | Performed by: NURSE PRACTITIONER

## 2024-09-04 RX ORDER — METOPROLOL SUCCINATE 25 MG/1
25 TABLET, EXTENDED RELEASE ORAL 2 TIMES DAILY
Qty: 200 TABLET | Refills: 3 | Status: SHIPPED | OUTPATIENT
Start: 2024-09-04

## 2024-09-04 ASSESSMENT — ENCOUNTER SYMPTOMS
NAUSEA: 0
HEARTBURN: 1
CHILLS: 0
PALPITATIONS: 1
LOSS OF CONSCIOUSNESS: 0
FEVER: 0
DIZZINESS: 1
HEADACHES: 0
ABDOMINAL PAIN: 0
SHORTNESS OF BREATH: 0
MYALGIAS: 0
ORTHOPNEA: 0
BRUISES/BLEEDS EASILY: 0
PND: 0
COUGH: 0

## 2024-09-04 ASSESSMENT — FIBROSIS 4 INDEX: FIB4 SCORE: 0.54

## 2024-09-04 NOTE — PROGRESS NOTES
"Chief Complaint   Patient presents with    Hospital Follow-up    Supraventricular Tachycardia (SVT)     History of IAST and POTS       Subjective     Kristin Balderrama is a 33 y.o. female who presents today for ER follow-up of tachycardia.    Kristin is a 33 year old female with history of sinus tachycardia, status post ablation in 2016 (Dr. Kumar) for sinus node modification for IST (Inappropriate Sinus Tachycardia), POTS on chronic Corlanor and beta-blocker therapy, hypermobility syndrome, moderate asthma/TONYA, IIH (idiopathic intracranial hypertension), NPH normal pressure hydrocephalus, optic neuropathy, chronic pain syndrome, bladder incontinence, S/P sacral stimulator, cervical neuralgia with leg weakness, nephrolithiasis S/P kidney stone extraction stent placement 2/14/2024, hip fracture 1/27/2023 and multiple psychiatric issues.     She is followed closely by Dr. Leon, and last saw EDD Thurman on 7/23/2024.    She was in the ER two days ago for tachycardia; her heart rate ranges from 70-130bpm, with minimal activity; she had been out of her Corlanor, and is now back on it. She is also dealing with a left axillary skin infection, and is on antibiotics. All labs were normal in the ER, including troponin.    Heart monitors have been suggested in the past, but she is allergic to the adhesive, and is unable to wear them for any period of time.    She mentioned being very tired and just soreness in her chest. Breathing is stable with no orthopnea or PND; she does have dizziness (with her POTS), but no recent episodes of syncope. She does stay well hydrated and eats about 5000mg of NA+ per day.     She is followed closely by numerous specialists. She does have scheduled follow-up with Dr. Leon on 9/19/2024.    Past Medical History:   Diagnosis Date    Abdominal pain     Anginal syndrome     random chest pain especially with tachycardia    Apnea, sleep     Arrhythmia     \"sinus tachycardia\", " "cariologist, Dr. Kumar; ablation 2/2016    Arthritis     osteo    ASTHMA     when around smoke    Back pain     Borderline personality disorder (HCC)     Breath shortness     with tachycardia    Bronchitis 02/08/2022    Last time was 12/21    Cardiac arrhythmia     Chickenpox     Chronic UTI 09/18/2010    Cough     Daytime sleepiness     Dental disorder 03/08/2021    infected tooth    Depression     Diabetes (HCC)     Diarrhea     incontinece    Disorder of thyroid     Hashimoto's    Fall     Fatigue     Frequent headaches     Gasping for breath     Gynecological disorder     PCOS    Headache(784.0)     Heart burn     Heart murmur     History of falling     Inappropriate sinus tachycardia (HCC)     Indigestion     Migraine     Mitochondrial disease (HCC)     Multiple personality disorder (HCC)     Nausea     Obesity     Other fatigue 06/29/2020    Pain 08/15/2012    back, 7/10    Painful joint     PCOS (polycystic ovarian syndrome)     Pneumonia 2012 12/2020    POTS (postural orthostatic tachycardia syndrome)     Psychosis (HCC)     Ringing in ears     Scoliosis     Shortness of breath     O2 as needed    Sinus tachycardia 10/31/2013    Sleep apnea     CPAP \"pulmonary doctor took me off mid year 2016\"    Snoring     Supraventricular tachycardia (HCC) 04/10/2019    Tonsillitis     Transverse myelitis (HCC)     2/8/22: Per pt: not anymore    Tuberculosis     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    Urinary bladder disorder     Suprapubic cath. 2/8/22: Not anymore.     Urinary incontinence     Weakness     Wears glasses      Past Surgical History:   Procedure Laterality Date    MT CYSTOSCOPY,INSERT URETERAL STENT Right 2/12/2024    Procedure: CYSTOSCOPY, WITH RIGHT URETEROSCOPY, WITH LITHOTRIPSY, WITH INSERTION OF RIGHT URETERAL STENT;  Surgeon: Josafat Roberson M.D.;  Location: SURGERY [facility];  Service: Urology    MT CYSTO/URETERO/PYELOSCOPY, DX Right 2/12/2024    Procedure: URETEROSCOPY;  Surgeon: Josafat GORE" RUFUS Roberson;  Location: [facility];  Service: Urology    LASERTRIPSY Right 2/12/2024    Procedure: LITHOTRIPSY, USING LASER;  Surgeon: Josafat Roberson M.D.;  Location: [facility];  Service: Urology    INGUINAL HERNIA LAPAROSCOPIC Right 07/21/2023    Procedure: LAPAROSCOPIC RIGHT INGUINAL HERNIA REPAIR WITH MESH;  Surgeon: Joe Noyola M.D.;  Location: [facility];  Service: General    HERNIA REPAIR Right 07/21/2023    PB PERCUT FIX PBOX/NECK FEMUR FX Left 01/28/2023    Procedure: FIXATION, HIP, USING CANNULATED SCREW;  Surgeon: Noman Abdul M.D.;  Location: [facility];  Service: Orthopedics    MO LAP,DIAGNOSTIC ABDOMEN  02/14/2022    Procedure: LAPAROSCOPY;  Surgeon: Seamus Pisano M.D.;  Location: SURGERY SAME DAY [facility];  Service: Gynecology    OVARIAN CYSTECTOMY Right 02/14/2022    Procedure: EXCISION, CYST, OVARY;  Surgeon: Seamus Pisano M.D.;  Location: SURGERY SAME DAY [facility];  Service: Gynecology    BOWEL STIMULATOR INSERTION  03/10/2021    Procedure: INSERTION, ELECTRODE LEADS AND PULSE GENERATOR, NEUROSTIMULATOR, SACRAL - REMOVAL OF INTERSTIM WITH REPLACEMENT OF SACRAL NEUROMODULATION DEVICE;  Surgeon: Joe Noyola M.D.;  Location: [facility];  Service: General    MUSCLE BIOPSY Right 01/26/2017    Procedure: MUSCLE BIOPSY - THIGH;  Surgeon: Isidro Vigil M.D.;  Location: [facility];  Service:     GASTROSCOPY WITH BALLOON DILATATION N/A 01/04/2017    Procedure: GASTROSCOPY WITH DILATATION;  Surgeon: Torres Vargas M.D.;  Location: [facility];  Service:     BOWEL STIMULATOR INSERTION  07/13/2016    Procedure: BOWEL STIMULATOR INSERTION FOR PERMANENT INTERSTIM SACRAL IMPLANT;  Surgeon: Joe Noyola M.D.;  Location: [facility];  Service:     RECOVERY  01/27/2016    Procedure: CATH LAB EP STUDY WITH SINUS NODE MODIFICATION LA;  Surgeon: [facility];  Location: SURGERY PRE-POST PROC UNIT [facility];   Service:     OTHER CARDIAC SURGERY  01/2016    cardiac ablation    NEURO DEST FACET L/S W/IG SNGL  04/21/2015    Performed by Reza Tabor at SURGERY [facility]    LUMBAR FUSION ANTERIOR  08/21/2012    Performed by SUSIE SAWANT at SURGERY [facility]    ALYSSA BY LAPAROSCOPY  08/29/2010    Performed by SHAYY JOHNS at SURGERY [facility]    LAMINOTOMY      OTHER ABDOMINAL SURGERY      TONSILLECTOMY      tonsillectomy     Family History   Problem Relation Age of Onset    Hypertension Mother     Sleep Apnea Mother     Heart Disease Mother     Other Mother         hypothryod    Hypertension Maternal Uncle     Heart Disease Maternal Grandmother     Hypertension Maternal Grandmother     No Known Problems Sister     Other Sister         Narcolepsy;fibromyalsia;bone;nerve    Genitourinary () Problems Sister         endometriosis     Social History     Socioeconomic History    Marital status: Single     Spouse name: Not on file    Number of children: Not on file    Years of education: Not on file    Highest education level: Not on file   Occupational History    Not on file   Tobacco Use    Smoking status: Never    Smokeless tobacco: Never   Vaping Use    Vaping status: Never Used   Substance and Sexual Activity    Alcohol use: No     Alcohol/week: 0.0 oz    Drug use: Never     Frequency: 7.0 times per week    Sexual activity: Not Currently     Birth control/protection: Implant   Other Topics Concern    Not on file   Social History Narrative    ** Merged History Encounter **          Social Determinants of Health     Financial Resource Strain: Medium Risk (4/30/2021)    Overall Financial Resource Strain (CARDIA)     Difficulty of Paying Living Expenses: Somewhat hard   Food Insecurity: No Food Insecurity (8/11/2024)    Hunger Vital Sign     Worried About Running Out of Food in the Last Year: Never true     Ran Out of Food in the Last Year: Never true   Transportation Needs: No Transportation Needs  "(8/11/2024)    PRAPARE - Transportation     Lack of Transportation (Medical): No     Lack of Transportation (Non-Medical): No   Physical Activity: Not on file   Stress: Not on file   Social Connections: Not on file   Intimate Partner Violence: Not At Risk (8/11/2024)    Humiliation, Afraid, Rape, and Kick questionnaire     Fear of Current or Ex-Partner: No     Emotionally Abused: No     Physically Abused: No     Sexually Abused: No   Housing Stability: Low Risk  (8/11/2024)    Housing Stability Vital Sign     Unable to Pay for Housing in the Last Year: No     Number of Places Lived in the Last Year: 1     Unstable Housing in the Last Year: No     Allergies   Allergen Reactions    Cefdinir Shortness of Breath and Itching     Tolerated 1/18/17  Tolerates ceftriaxone  Tolerated augmentin 8/2019     Depakote [Divalproex Sodium] Unspecified     Muscle spasms/muscle pain and weakness      Depakote [Divalproex Sodium]      Muscle spasms/muscle pain and weakness    Doxycycline Anaphylaxis and Vomiting     Other reaction(s): pustules/blisters  Other reaction(s): stomach pain    Montelukast      Cardiac effusion    Montelukast [Singulair] Unspecified     Cardiac effusion    Vancomycin Itching     Pt becomes flushed in face and chest.   RXN=7/10/16    Wound Dressing Adhesive Rash     By pt report-\"removes skin totally off\"    Amitriptyline Unspecified     Headaches      Amoxicillin Rash          Aripiprazole Unspecified     Headaches/muscle twitching      Aripiprazole [Abilify] Unspecified     Headaches/muscle twitching      Clindamycin Nausea         Other reaction(s): nausea stomach pain    Erythromycin Rash     .  Other reaction(s): nausea stomach pain    Flomax [Tamsulosin Hydrochloride] Swelling    Hydromorphone      Other reaction(s): vomiting    Levaquin Unspecified     Severe muscle cramps in legs  RXN=unknown  Other reaction(s): leg muscle cramps    Metformin Unspecified     Increased lactic acid     Other " "reaction(s): itching and rash/nausea vomiting    Sulfa Drugs Hives, Itching, Myalgia and Unspecified     Muscle pain and weakness    Other reaction(s): unknown    Tamsulosin Swelling     Swelling of legs    Tape Rash     Tears skin off  coban with Tegaderm tape ok intermittently  RXN=ongoing    Sulfamethoxazole W-Trimethoprim Rash    Ampicillin Rash     Pt reports that she received a rash     Ciprofloxacin Rash          Keflex Rash     Pt states she gets a rash with this medication  Tolerates ceftriaxone  Can take with Benadryl    Levofloxacin Unspecified     Leg muscle cramps    Metronidazole Rash     \"Vision problems\"  Other reaction(s): vision problems    Penicillins Hives     Can take with Benadryl    Valproic Acid Rash     .     Outpatient Encounter Medications as of 9/4/2024   Medication Sig Dispense Refill    metoprolol SR (TOPROL XL) 25 MG TABLET SR 24 HR Take 1 Tablet by mouth 2 times a day. 200 Tablet 3    Galcanezumab-gnlm (EMGALITY) 120 MG/ML Solution Auto-injector Inject 1 mL subcutaneously every month. 1 mL 11    lamoTRIgine (LAMICTAL) 25 MG Tab Take 2 Tablets by mouth 2 times a day. 360 Tablet 1    Rimegepant Sulfate (NURTEC) 75 MG TABLET DISPERSIBLE Take 1 tablet by mouth at onset of migraine or aura okay to repeat in 24 hours if needed. 8 Tablet 5    ondansetron (ZOFRAN ODT) 4 MG TABLET DISPERSIBLE Take 1-2 tablets by mouth at onset of nausea or vomiting okay to repeat in 12 hours if needed 20 Tablet 2    sumatriptan (IMITREX) 20 MG/ACT nasal spray Give 1 dose at onset headache okay to repeat in 2 hours if needed for max of 2 doses in a 24 hour timeframe. 6 Each 5    ivabradine (CORLANOR) 7.5 MG Tab tablet Take 1 Tablet by mouth 2 times a day with meals. 60 Tablet 3    diphenhydrAMINE (BENADRYL) 25 MG Tab Take 25-50 mg by mouth 2 times a day. Scheduled    25 mg = AM  50 MG = PM      Melatonin 10 MG Tab Take 10 mg by mouth at bedtime.      sumatriptan (IMITREX) 5 MG/ACT nasal spray Administer 1 Spray " into affected nostril(S) as needed. Indications: Migraine Headache      topiramate (TOPAMAX) 50 MG tablet Take 50 mg by mouth 2 times a day.      spironolactone (ALDACTONE) 25 MG Tab 25 mg every day.      ibuprofen (MOTRIN) 800 MG Tab Take 1 Tablet by mouth every 8 hours as needed for Mild Pain. 30 Tablet 0    loperamide (IMODIUM) 2 MG Cap Take 1 Capsule by mouth 4 times a day as needed for Diarrhea. 30 Capsule 0    adalimumab (HUMIRA) 80 MG/0.8ML injection Inject 80 mg under the skin every 14 days. Last injection was on 3/14/2024      amLODIPine (NORVASC) 5 MG Tab Take 5 mg by mouth every day.      Rimegepant Sulfate (NURTEC) 75 MG TABLET DISPERSIBLE Take 75 mg by mouth 1 time a day as needed (migraine).      gabapentin (NEURONTIN) 400 MG Cap Take 1,200 mg by mouth 3 times a day. 3 capsules=1200mg      omeprazole (PRILOSEC) 20 MG delayed-release capsule Take 20 mg by mouth 2 times a day.      lamoTRIgine (LAMICTAL) 25 MG Tab Take 50 mg by mouth 2 times a day. 25 mg x 2 tablets = 50 mg      Galcanezumab-gnlm (EMGALITY) 120 MG/ML Solution Auto-injector Inject 120 mg under the skin every 30 (thirty) days. On the 27th of every month, last dose was taken on 2/27/2024      ziprasidone (GEODON) 40 MG Cap Take 1 capsule by mouth at bedtime. 30 Capsule 2    etonogestrel (NEXPLANON) 68 MG Implant implant Inject 68 mg under the skin see administration instructions. Implant in left arm  Every 3 years to change, thinks it was placed 2021      [DISCONTINUED] metoprolol SR (TOPROL XL) 25 MG TABLET SR 24 HR TAKE 1 TABLET BY MOUTH EVERY DAY 90 Tablet 3     No facility-administered encounter medications on file as of 9/4/2024.     Review of Systems   Constitutional:  Positive for malaise/fatigue. Negative for chills and fever.   HENT:  Negative for congestion.    Respiratory:  Negative for cough and shortness of breath.    Cardiovascular:  Positive for palpitations. Negative for chest pain, orthopnea, leg swelling and PND.  "  Gastrointestinal:  Positive for heartburn. Negative for abdominal pain and nausea.   Musculoskeletal:  Negative for myalgias.   Skin:  Negative for rash.   Neurological:  Positive for dizziness. Negative for loss of consciousness and headaches.   Endo/Heme/Allergies:  Does not bruise/bleed easily.              Objective     /88 (BP Location: Left arm, Patient Position: Sitting, BP Cuff Size: Adult)   Pulse 82   Resp 16   Ht 1.626 m (5' 4\")   Wt 117 kg (257 lb 15 oz)   SpO2 96%   BMI 44.27 kg/m²     Physical Exam  Constitutional:       Appearance: She is well-developed.      Comments: Ambulates with a cane.   HENT:      Head: Normocephalic.   Neck:      Vascular: No JVD.   Cardiovascular:      Rate and Rhythm: Normal rate and regular rhythm.      Heart sounds: Normal heart sounds.   Pulmonary:      Effort: Pulmonary effort is normal. No respiratory distress.      Breath sounds: Normal breath sounds. No wheezing or rales.   Abdominal:      General: Bowel sounds are normal. There is no distension.      Palpations: Abdomen is soft.      Tenderness: There is no abdominal tenderness.   Musculoskeletal:         General: Normal range of motion.      Cervical back: Normal range of motion and neck supple.   Skin:     General: Skin is warm and dry.      Findings: No rash.   Neurological:      Mental Status: She is alert and oriented to person, place, and time.   Psychiatric:         Mood and Affect: Mood normal.         Behavior: Behavior normal.          Impression of MPI of 12/27/2022 ([facility]):  1. Negative pharmacological stress test for ischemia or infarction.  2. Normal left ventricular systolic function with a calculated ejection fraction of 72 % with normal wall motion.  3. No chest pain and T wave inversion was noted with the infusion that resolved into recovery.    CONCLUSIONS OF TTE OF 12/26/2022:  Normal left ventricular systolic function.  The left ventricular ejection fraction is visually estimated to " be 55%.  Normal right ventricular size and systolic function.  Right heart pressures are normal.    Component      Latest Ref Rng 9/2/2024   Troponin T      6 - 19 ng/L 7    D-Dimer      0.00 - 0.50 ug/mL (FEU) <0.27    TSH      0.380 - 5.330 uIU/mL 2.050        Lab Results   Component Value Date/Time    SODIUM 141 09/02/2024 12:15 PM    POTASSIUM 4.1 09/02/2024 12:15 PM    CHLORIDE 108 09/02/2024 12:15 PM    CO2 18 (L) 09/02/2024 12:15 PM    GLUCOSE 139 (H) 09/02/2024 12:15 PM    BUN 16 09/02/2024 12:15 PM    CREATININE 0.88 09/02/2024 12:15 PM    CREATININE 0.75 (L) 07/20/2010 11:00 AM    BUNCREATRAT 15.6 12/31/2023 09:47 AM    BUNCREATRAT 19 07/20/2010 11:00 AM    GLOMRATE >59 07/20/2010 11:00 AM      Lab Results   Component Value Date/Time    WBC 5.6 09/02/2024 12:15 PM    WBC 6.1 07/20/2010 11:00 AM    RBC 4.62 09/02/2024 12:15 PM    RBC 4.38 07/20/2010 11:00 AM    HEMOGLOBIN 14.4 09/02/2024 12:15 PM    HEMATOCRIT 42.2 09/02/2024 12:15 PM    MCV 91.3 09/02/2024 12:15 PM    MCV 93 07/20/2010 11:00 AM    MCH 31.2 09/02/2024 12:15 PM    MCH 30.1 07/20/2010 11:00 AM    MCHC 34.1 09/02/2024 12:15 PM    MPV 11.0 09/02/2024 12:15 PM         Assessment & Plan     1. Inappropriate sinus tachycardia (HCC)  metoprolol SR (TOPROL XL) 25 MG TABLET SR 24 HR      2. SVT (supraventricular tachycardia) (HCC)        3. Postural orthostatic tachycardia syndrome        4. Moderate persistent asthma without complication            Medical Decision Making: Today's Assessment/Status/Plan:      1. IAST, status post ablation in 2016 (Dr. Kumar), previously stable on Corlanor 7.5mg BID and Toprol XL 25mg once daily. She just resumed Corlanor. She is not able to tolerate adhesives for a holter monitor. Briefly discussed possible implantable loop recorder; will discuss with Dr. Leon; she does have follow-up with his in 2 weeks. Can briefly try increasing Toprol XL from 25mg to 37.5mg, or even 50mg once daily, to see if this helps  with HR. To watch/monitor BP carefully at home.    2. POTS, managed with lifestyle modifications. She stays well hydrated and eats high salt diet (5000+mg).    3. Moderate asthma, currently stable.     Plan as above: can try increasing Metoprolol from 25mg to 37.5mg, or even 50mg to help with HR. Watch BP carefully at home.  Will discuss with Dr. Leon whether ILR is possiblity for her.  Keep follow-up as scheduled, including with other providers.

## 2024-09-04 NOTE — LETTER
[facility]   [address] [address]  Phone: [phone]  Fax: [phone]              Kristin Balderrama  1990    Encounter Date: 9/4/2024    AXEL Villavicencio          PROGRESS NOTE:  Chief Complaint   Patient presents with    Hospital Follow-up    Supraventricular Tachycardia (SVT)     History of IAST and POTS       Subjective    Kristin Balderrama is a 33 y.o. female who presents today for ER follow-up of tachycardia.    Kristin is a 33 year old female with history of sinus tachycardia, status post ablation in 2016 (Dr. Kumar) for sinus node modification for IST (Inappropriate Sinus Tachycardia), POTS on chronic Corlanor and beta-blocker therapy, hypermobility syndrome, moderate asthma/TONYA, IIH (idiopathic intracranial hypertension), NPH normal pressure hydrocephalus, optic neuropathy, chronic pain syndrome, bladder incontinence, S/P sacral stimulator, cervical neuralgia with leg weakness, nephrolithiasis S/P kidney stone extraction stent placement 2/14/2024, hip fracture 1/27/2023 and multiple psychiatric issues.     She is followed closely by Dr. Leon, and last saw EDD Thurman on 7/23/2024.    She was in the ER two days ago for tachycardia; her heart rate ranges from 70-130bpm, with minimal activity; she had been out of her Corlanor, and is now back on it. She is also dealing with a left axillary skin infection, and is on antibiotics. All labs were normal in the ER, including troponin.    Heart monitors have been suggested in the past, but she is allergic to the adhesive, and is unable to wear them for any period of time.    She mentioned being very tired and just soreness in her chest. Breathing is stable with no orthopnea or PND; she does have dizziness (with her POTS), but no recent episodes of syncope. She does stay well hydrated and eats about 5000mg of NA+ per day.     She is followed  "closely by numerous specialists. She does have scheduled follow-up with Dr. Leon on 9/19/2024.    Past Medical History:   Diagnosis Date    Abdominal pain     Anginal syndrome     random chest pain especially with tachycardia    Apnea, sleep     Arrhythmia     \"sinus tachycardia\", cariologist, Dr. Kumar; ablation 2/2016    Arthritis     osteo    ASTHMA     when around smoke    Back pain     Borderline personality disorder (HCC)     Breath shortness     with tachycardia    Bronchitis 02/08/2022    Last time was 12/21    Cardiac arrhythmia     Chickenpox     Chronic UTI 09/18/2010    Cough     Daytime sleepiness     Dental disorder 03/08/2021    infected tooth    Depression     Diabetes (HCC)     Diarrhea     incontinece    Disorder of thyroid     Hashimoto's    Fall     Fatigue     Frequent headaches     Gasping for breath     Gynecological disorder     PCOS    Headache(784.0)     Heart burn     Heart murmur     History of falling     Inappropriate sinus tachycardia (HCC)     Indigestion     Migraine     Mitochondrial disease (HCC)     Multiple personality disorder (HCC)     Nausea     Obesity     Other fatigue 06/29/2020    Pain 08/15/2012    back, 7/10    Painful joint     PCOS (polycystic ovarian syndrome)     Pneumonia 2012 12/2020    POTS (postural orthostatic tachycardia syndrome)     Psychosis (HCC)     Ringing in ears     Scoliosis     Shortness of breath     O2 as needed    Sinus tachycardia 10/31/2013    Sleep apnea     CPAP \"pulmonary doctor took me off mid year 2016\"    Snoring     Supraventricular tachycardia (HCC) 04/10/2019    Tonsillitis     Transverse myelitis (HCC)     2/8/22: Per pt: not anymore    Tuberculosis     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    Urinary bladder disorder     Suprapubic cath. 2/8/22: Not anymore.     Urinary incontinence     Weakness     Wears glasses      Past Surgical History:   Procedure Laterality Date    MI CYSTOSCOPY,INSERT URETERAL STENT Right 2/12/2024 "    Procedure: CYSTOSCOPY, WITH RIGHT URETEROSCOPY, WITH LITHOTRIPSY, WITH INSERTION OF RIGHT URETERAL STENT;  Surgeon: Josafat Roberson M.D.;  Location: [facility];  Service: Urology    GA CYSTO/URETERO/PYELOSCOPY, DX Right 2/12/2024    Procedure: URETEROSCOPY;  Surgeon: Josafat Roberson M.D.;  Location: [facility];  Service: Urology    LASERTRIPSY Right 2/12/2024    Procedure: LITHOTRIPSY, USING LASER;  Surgeon: Josafat Roberson M.D.;  Location: [facility];  Service: Urology    INGUINAL HERNIA LAPAROSCOPIC Right 07/21/2023    Procedure: LAPAROSCOPIC RIGHT INGUINAL HERNIA REPAIR WITH MESH;  Surgeon: Joe Noyola M.D.;  Location: [facility];  Service: General    HERNIA REPAIR Right 07/21/2023    PB PERCUT FIX PBOX/NECK FEMUR FX Left 01/28/2023    Procedure: FIXATION, HIP, USING CANNULATED SCREW;  Surgeon: Noman Abdul M.D.;  Location: [facility];  Service: Orthopedics    GA LAP,DIAGNOSTIC ABDOMEN  02/14/2022    Procedure: LAPAROSCOPY;  Surgeon: Seamus Pisano M.D.;  Location: SURGERY SAME DAY [facility];  Service: Gynecology    OVARIAN CYSTECTOMY Right 02/14/2022    Procedure: EXCISION, CYST, OVARY;  Surgeon: Seamus Pisano M.D.;  Location: SURGERY SAME DAY [facility];  Service: Gynecology    BOWEL STIMULATOR INSERTION  03/10/2021    Procedure: INSERTION, ELECTRODE LEADS AND PULSE GENERATOR, NEUROSTIMULATOR, SACRAL - REMOVAL OF INTERSTIM WITH REPLACEMENT OF SACRAL NEUROMODULATION DEVICE;  Surgeon: Joe Noyola M.D.;  Location: [facility];  Service: General    MUSCLE BIOPSY Right 01/26/2017    Procedure: MUSCLE BIOPSY - THIGH;  Surgeon: Isidro Vigil M.D.;  Location: [facility];  Service:     GASTROSCOPY WITH BALLOON DILATATION N/A 01/04/2017    Procedure: GASTROSCOPY WITH DILATATION;  Surgeon: Torres Vargas M.D.;  Location: [facility];  Service:     BOWEL STIMULATOR INSERTION  07/13/2016    Procedure: BOWEL STIMULATOR INSERTION  FOR PERMANENT INTERSTIM SACRAL IMPLANT;  Surgeon: Joe Noyola M.D.;  Location: SURGERY [facility];  Service:     RECOVERY  01/27/2016    Procedure: CATH LAB EP STUDY WITH SINUS NODE MODIFICATION ABHINAV;  Surgeon: Lina Surgery;  Location: SURGERY PRE-POST PROC UNIT [facility];  Service:     OTHER CARDIAC SURGERY  01/2016    cardiac ablation    NEURO DEST FACET L/S W/IG SNGL  04/21/2015    Performed by Reza Tabor at SUR[facility]ERY [facility]    LUMBAR FUSION ANTERIOR  08/21/2012    Performed by SUSIE SAWANT at SURGERY [facility]    ALYSSA BY LAPAROSCOPY  08/29/2010    Performed by SHAYY JOHNS at SURGERY [facility]    LAMINOTOMY      OTHER ABDOMINAL SURGERY      TONSILLECTOMY      tonsillectomy     Family History   Problem Relation Age of Onset    Hypertension Mother     Sleep Apnea Mother     Heart Disease Mother     Other Mother         hypothryod    Hypertension Maternal Uncle     Heart Disease Maternal Grandmother     Hypertension Maternal Grandmother     No Known Problems Sister     Other Sister         Narcolepsy;fibromyalsia;bone;nerve    Genitourinary () Problems Sister         endometriosis     Social History     Socioeconomic History    Marital status: Single     Spouse name: Not on file    Number of children: Not on file    Years of education: Not on file    Highest education level: Not on file   Occupational History    Not on file   Tobacco Use    Smoking status: Never    Smokeless tobacco: Never   Vaping Use    Vaping status: Never Used   Substance and Sexual Activity    Alcohol use: No     Alcohol/week: 0.0 oz    Drug use: Never     Frequency: 7.0 times per week    Sexual activity: Not Currently     Birth control/protection: Implant   Other Topics Concern    Not on file   Social History Narrative    ** Merged History Encounter **          Social Determinants of Health     Financial Resource Strain: Medium Risk (4/30/2021)    Overall Financial Resource Strain (CARDIA)      "Difficulty of Paying Living Expenses: Somewhat hard   Food Insecurity: No Food Insecurity (8/11/2024)    Hunger Vital Sign     Worried About Running Out of Food in the Last Year: Never true     Ran Out of Food in the Last Year: Never true   Transportation Needs: No Transportation Needs (8/11/2024)    PRAPARE - Transportation     Lack of Transportation (Medical): No     Lack of Transportation (Non-Medical): No   Physical Activity: Not on file   Stress: Not on file   Social Connections: Not on file   Intimate Partner Violence: Not At Risk (8/11/2024)    Humiliation, Afraid, Rape, and Kick questionnaire     Fear of Current or Ex-Partner: No     Emotionally Abused: No     Physically Abused: No     Sexually Abused: No   Housing Stability: Low Risk  (8/11/2024)    Housing Stability Vital Sign     Unable to Pay for Housing in the Last Year: No     Number of Places Lived in the Last Year: 1     Unstable Housing in the Last Year: No     Allergies   Allergen Reactions    Cefdinir Shortness of Breath and Itching     Tolerated 1/18/17  Tolerates ceftriaxone  Tolerated augmentin 8/2019     Depakote [Divalproex Sodium] Unspecified     Muscle spasms/muscle pain and weakness      Depakote [Divalproex Sodium]      Muscle spasms/muscle pain and weakness    Doxycycline Anaphylaxis and Vomiting     Other reaction(s): pustules/blisters  Other reaction(s): stomach pain    Montelukast      Cardiac effusion    Montelukast [Singulair] Unspecified     Cardiac effusion    Vancomycin Itching     Pt becomes flushed in face and chest.   RXN=7/10/16    Wound Dressing Adhesive Rash     By pt report-\"removes skin totally off\"    Amitriptyline Unspecified     Headaches      Amoxicillin Rash          Aripiprazole Unspecified     Headaches/muscle twitching      Aripiprazole [Abilify] Unspecified     Headaches/muscle twitching      Clindamycin Nausea         Other reaction(s): nausea stomach pain    Erythromycin Rash     .  Other reaction(s): nausea " "stomach pain    Flomax [Tamsulosin Hydrochloride] Swelling    Hydromorphone      Other reaction(s): vomiting    Levaquin Unspecified     Severe muscle cramps in legs  RXN=unknown  Other reaction(s): leg muscle cramps    Metformin Unspecified     Increased lactic acid     Other reaction(s): itching and rash/nausea vomiting    Sulfa Drugs Hives, Itching, Myalgia and Unspecified     Muscle pain and weakness    Other reaction(s): unknown    Tamsulosin Swelling     Swelling of legs    Tape Rash     Tears skin off  coban with Tegaderm tape ok intermittently  RXN=ongoing    Sulfamethoxazole W-Trimethoprim Rash    Ampicillin Rash     Pt reports that she received a rash     Ciprofloxacin Rash          Keflex Rash     Pt states she gets a rash with this medication  Tolerates ceftriaxone  Can take with Benadryl    Levofloxacin Unspecified     Leg muscle cramps    Metronidazole Rash     \"Vision problems\"  Other reaction(s): vision problems    Penicillins Hives     Can take with Benadryl    Valproic Acid Rash     .     Outpatient Encounter Medications as of 9/4/2024   Medication Sig Dispense Refill    metoprolol SR (TOPROL XL) 25 MG TABLET SR 24 HR Take 1 Tablet by mouth 2 times a day. 200 Tablet 3    Galcanezumab-gnlm (EMGALITY) 120 MG/ML Solution Auto-injector Inject 1 mL subcutaneously every month. 1 mL 11    lamoTRIgine (LAMICTAL) 25 MG Tab Take 2 Tablets by mouth 2 times a day. 360 Tablet 1    Rimegepant Sulfate (NURTEC) 75 MG TABLET DISPERSIBLE Take 1 tablet by mouth at onset of migraine or aura okay to repeat in 24 hours if needed. 8 Tablet 5    ondansetron (ZOFRAN ODT) 4 MG TABLET DISPERSIBLE Take 1-2 tablets by mouth at onset of nausea or vomiting okay to repeat in 12 hours if needed 20 Tablet 2    sumatriptan (IMITREX) 20 MG/ACT nasal spray Give 1 dose at onset headache okay to repeat in 2 hours if needed for max of 2 doses in a 24 hour timeframe. 6 Each 5    ivabradine (CORLANOR) 7.5 MG Tab tablet Take 1 Tablet by " mouth 2 times a day with meals. 60 Tablet 3    diphenhydrAMINE (BENADRYL) 25 MG Tab Take 25-50 mg by mouth 2 times a day. Scheduled    25 mg = AM  50 MG = PM      Melatonin 10 MG Tab Take 10 mg by mouth at bedtime.      sumatriptan (IMITREX) 5 MG/ACT nasal spray Administer 1 Spray into affected nostril(S) as needed. Indications: Migraine Headache      topiramate (TOPAMAX) 50 MG tablet Take 50 mg by mouth 2 times a day.      spironolactone (ALDACTONE) 25 MG Tab 25 mg every day.      ibuprofen (MOTRIN) 800 MG Tab Take 1 Tablet by mouth every 8 hours as needed for Mild Pain. 30 Tablet 0    loperamide (IMODIUM) 2 MG Cap Take 1 Capsule by mouth 4 times a day as needed for Diarrhea. 30 Capsule 0    adalimumab (HUMIRA) 80 MG/0.8ML injection Inject 80 mg under the skin every 14 days. Last injection was on 3/14/2024      amLODIPine (NORVASC) 5 MG Tab Take 5 mg by mouth every day.      Rimegepant Sulfate (NURTEC) 75 MG TABLET DISPERSIBLE Take 75 mg by mouth 1 time a day as needed (migraine).      gabapentin (NEURONTIN) 400 MG Cap Take 1,200 mg by mouth 3 times a day. 3 capsules=1200mg      omeprazole (PRILOSEC) 20 MG delayed-release capsule Take 20 mg by mouth 2 times a day.      lamoTRIgine (LAMICTAL) 25 MG Tab Take 50 mg by mouth 2 times a day. 25 mg x 2 tablets = 50 mg      Galcanezumab-gnlm (EMGALITY) 120 MG/ML Solution Auto-injector Inject 120 mg under the skin every 30 (thirty) days. On the 27th of every month, last dose was taken on 2/27/2024      ziprasidone (GEODON) 40 MG Cap Take 1 capsule by mouth at bedtime. 30 Capsule 2    etonogestrel (NEXPLANON) 68 MG Implant implant Inject 68 mg under the skin see administration instructions. Implant in left arm  Every 3 years to change, thinks it was placed 2021      [DISCONTINUED] metoprolol SR (TOPROL XL) 25 MG TABLET SR 24 HR TAKE 1 TABLET BY MOUTH EVERY DAY 90 Tablet 3     No facility-administered encounter medications on file as of 9/4/2024.     Review of Systems  "  Constitutional:  Positive for malaise/fatigue. Negative for chills and fever.   HENT:  Negative for congestion.    Respiratory:  Negative for cough and shortness of breath.    Cardiovascular:  Positive for palpitations. Negative for chest pain, orthopnea, leg swelling and PND.   Gastrointestinal:  Positive for heartburn. Negative for abdominal pain and nausea.   Musculoskeletal:  Negative for myalgias.   Skin:  Negative for rash.   Neurological:  Positive for dizziness. Negative for loss of consciousness and headaches.   Endo/Heme/Allergies:  Does not bruise/bleed easily.              Objective    /88 (BP Location: Left arm, Patient Position: Sitting, BP Cuff Size: Adult)   Pulse 82   Resp 16   Ht 1.626 m (5' 4\")   Wt 117 kg (257 lb 15 oz)   SpO2 96%   BMI 44.27 kg/m²     Physical Exam  Constitutional:       Appearance: She is well-developed.      Comments: Ambulates with a cane.   HENT:      Head: Normocephalic.   Neck:      Vascular: No JVD.   Cardiovascular:      Rate and Rhythm: Normal rate and regular rhythm.      Heart sounds: Normal heart sounds.   Pulmonary:      Effort: Pulmonary effort is normal. No respiratory distress.      Breath sounds: Normal breath sounds. No wheezing or rales.   Abdominal:      General: Bowel sounds are normal. There is no distension.      Palpations: Abdomen is soft.      Tenderness: There is no abdominal tenderness.   Musculoskeletal:         General: Normal range of motion.      Cervical back: Normal range of motion and neck supple.   Skin:     General: Skin is warm and dry.      Findings: No rash.   Neurological:      Mental Status: She is alert and oriented to person, place, and time.   Psychiatric:         Mood and Affect: Mood normal.         Behavior: Behavior normal.          Impression of MPI of 12/27/2022 ([facility]):  1. Negative pharmacological stress test for ischemia or infarction.  2. Normal left ventricular systolic function with a calculated ejection " fraction of 72 % with normal wall motion.  3. No chest pain and T wave inversion was noted with the infusion that resolved into recovery.    CONCLUSIONS OF TTE OF 12/26/2022:  Normal left ventricular systolic function.  The left ventricular ejection fraction is visually estimated to be 55%.  Normal right ventricular size and systolic function.  Right heart pressures are normal.    Component      Latest Ref Rng 9/2/2024   Troponin T      6 - 19 ng/L 7    D-Dimer      0.00 - 0.50 ug/mL (FEU) <0.27    TSH      0.380 - 5.330 uIU/mL 2.050        Lab Results   Component Value Date/Time    SODIUM 141 09/02/2024 12:15 PM    POTASSIUM 4.1 09/02/2024 12:15 PM    CHLORIDE 108 09/02/2024 12:15 PM    CO2 18 (L) 09/02/2024 12:15 PM    GLUCOSE 139 (H) 09/02/2024 12:15 PM    BUN 16 09/02/2024 12:15 PM    CREATININE 0.88 09/02/2024 12:15 PM    CREATININE 0.75 (L) 07/20/2010 11:00 AM    BUNCREATRAT 15.6 12/31/2023 09:47 AM    BUNCREATRAT 19 07/20/2010 11:00 AM    GLOMRATE >59 07/20/2010 11:00 AM      Lab Results   Component Value Date/Time    WBC 5.6 09/02/2024 12:15 PM    WBC 6.1 07/20/2010 11:00 AM    RBC 4.62 09/02/2024 12:15 PM    RBC 4.38 07/20/2010 11:00 AM    HEMOGLOBIN 14.4 09/02/2024 12:15 PM    HEMATOCRIT 42.2 09/02/2024 12:15 PM    MCV 91.3 09/02/2024 12:15 PM    MCV 93 07/20/2010 11:00 AM    MCH 31.2 09/02/2024 12:15 PM    MCH 30.1 07/20/2010 11:00 AM    MCHC 34.1 09/02/2024 12:15 PM    MPV 11.0 09/02/2024 12:15 PM         Assessment & Plan    1. Inappropriate sinus tachycardia (HCC)  metoprolol SR (TOPROL XL) 25 MG TABLET SR 24 HR      2. SVT (supraventricular tachycardia) (HCC)        3. Postural orthostatic tachycardia syndrome        4. Moderate persistent asthma without complication            Medical Decision Making: Today's Assessment/Status/Plan:      1. IAST, status post ablation in 2016 (Dr. Kumar), previously stable on Corlanor 7.5mg BID and Toprol XL 25mg once daily. She just resumed Corlanor. She is not able  to tolerate adhesives for a holter monitor. Briefly discussed possible implantable loop recorder; will discuss with Dr. Leon; she does have follow-up with his in 2 weeks. Can briefly try increasing Toprol XL from 25mg to 37.5mg, or even 50mg once daily, to see if this helps with HR. To watch/monitor BP carefully at home.    2. POTS, managed with lifestyle modifications. She stays well hydrated and eats high salt diet (5000+mg).    3. Moderate asthma, currently stable.     Plan as above: can try increasing Metoprolol from 25mg to 37.5mg, or even 50mg to help with HR. Watch BP carefully at home.  Will discuss with Dr. Leon whether ILR is possiblity for her.  Keep follow-up as scheduled, including with other providers.                    John Leon M.D.  [address]  [address]  [place] [phone]  Via In Basket

## 2024-09-05 ENCOUNTER — APPOINTMENT (OUTPATIENT)
Dept: VASCULAR LAB | Facility: MEDICAL CENTER | Age: 34
End: 2024-09-05
Attending: FAMILY MEDICINE
Payer: MEDICARE

## 2024-09-06 ENCOUNTER — OFFICE VISIT (OUTPATIENT)
Dept: URGENT CARE | Facility: CLINIC | Age: 34
End: 2024-09-06
Payer: MEDICARE

## 2024-09-06 ENCOUNTER — HOSPITAL ENCOUNTER (OUTPATIENT)
Facility: MEDICAL CENTER | Age: 34
End: 2024-09-06
Payer: MEDICARE

## 2024-09-06 VITALS
RESPIRATION RATE: 17 BRPM | WEIGHT: 260 LBS | HEIGHT: 64 IN | HEART RATE: 94 BPM | TEMPERATURE: 98.3 F | DIASTOLIC BLOOD PRESSURE: 78 MMHG | BODY MASS INDEX: 44.39 KG/M2 | OXYGEN SATURATION: 100 % | SYSTOLIC BLOOD PRESSURE: 124 MMHG

## 2024-09-06 DIAGNOSIS — L73.2 HIDRADENITIS SUPPURATIVA OF LEFT AXILLA: ICD-10-CM

## 2024-09-06 DIAGNOSIS — L02.91 ABSCESS: ICD-10-CM

## 2024-09-06 LAB
GRAM STN SPEC: NORMAL
SIGNIFICANT IND 70042: NORMAL
SITE SITE: NORMAL
SOURCE SOURCE: NORMAL

## 2024-09-06 PROCEDURE — 87205 SMEAR GRAM STAIN: CPT

## 2024-09-06 PROCEDURE — 3074F SYST BP LT 130 MM HG: CPT

## 2024-09-06 PROCEDURE — 87186 SC STD MICRODIL/AGAR DIL: CPT

## 2024-09-06 PROCEDURE — 87077 CULTURE AEROBIC IDENTIFY: CPT

## 2024-09-06 PROCEDURE — 87070 CULTURE OTHR SPECIMN AEROBIC: CPT

## 2024-09-06 PROCEDURE — 99214 OFFICE O/P EST MOD 30 MIN: CPT

## 2024-09-06 PROCEDURE — 3078F DIAST BP <80 MM HG: CPT

## 2024-09-06 ASSESSMENT — FIBROSIS 4 INDEX: FIB4 SCORE: 0.54

## 2024-09-06 NOTE — PROGRESS NOTES
"Chief Complaint   Patient presents with    Other     Cyst on left arm pit/painful. Pt got anti-biotics from [facility] urgent care 3 days ago. Cyst is not getting smaller per pt         Subjective:   HISTORY OF PRESENT ILLNESS: Kristin Balderrama is a 33 y.o. female who presents for cyst in her left arm pit.  She has had this for many months and has been on multiple round of antibx, she reports it is not getting any smaller and now it seems soft.    Patient denies fever, chills, body aches.  She has taken keflex and is currently on doxy.  Hx of Hidradenitis suppurativa    Medications, Allergies, current problem list, Social and Family history reviewed today in Epic.     Objective:     /78 (BP Location: Left arm, Patient Position: Sitting, BP Cuff Size: Large adult)   Pulse 94   Temp 36.8 °C (98.3 °F) (Temporal)   Resp 17   Ht 1.626 m (5' 4\")   Wt 118 kg (260 lb)   SpO2 100%     Physical Exam  Vitals reviewed.   Constitutional:       Appearance: Normal appearance.   HENT:      Mouth/Throat:      Mouth: Mucous membranes are moist.   Cardiovascular:      Rate and Rhythm: Normal rate.   Pulmonary:      Effort: Pulmonary effort is normal.   Skin:     General: Skin is warm and dry.      Comments: Small round 1x1 cm abscess with fluctuance noted to left axilla   Neurological:      Mental Status: She is alert and oriented to person, place, and time.   Psychiatric:         Mood and Affect: Mood normal.          Assessment/Plan:     Diagnosis and associated orders    I personally reviewed prior external notes and test results pertinent to today's visit.     1. Abscess  CULTURE WOUND W/ GRAM STAIN      2. Hidradenitis suppurativa of left axilla  CULTURE WOUND W/ GRAM STAIN            IMPRESSION:  Pt has stable vital signs and no red flag symptoms or exam findings identified.   Informed pt that symptoms are consistent with abscess and requires I&D,  she would like to proceed    Procedure: Incision and Drainage of " Abscess  - Risks, benefits, and alternatives reviewed.  - Verbal consent received from patient to proceed with incision and drainage.  - Site prepared with Betadine.  - Clean technique with sterile instruments.  - Local anesthesia achieved with 3 mL of 1% lidocaine without epinephrine.  - Small incision with #11 blade scalpel made into fluctuant area with purulent material expressed.  - Cavity probed and loculations bluntly taken down with hemostat.  - Irrigated copiously with NS.  - Minimal bleeding with good hemostasis achieved.  - Non-adhesive dressing applied.  - There were no procedural complications.  - Patient tolerated procedure well.  .  Advised to keep clean and ry, FU with Derm for her HS.  She will continue on her doxy and I will call with wound culture results if a change floyd therapy is needed.     Differential diagnosis discussed. Pt was Educated on red flag symptoms. Pt has been Instructed to return to Urgent Care or nearest Emergency Department if symptoms fail to improve, for any change in condition, further concerns, or new concerning symptoms. Patient states understanding of the plan of care and discharge instructions.  They are discharged in stable condition.         Please note that this dictation was created using voice recognition software. I have made a reasonable attempt to correct obvious errors, but I expect that there are errors of grammar and possibly content that I did not discover before finalizing the note.    This note was electronically signed by VANIA Peña

## 2024-09-08 ENCOUNTER — HOSPITAL ENCOUNTER (EMERGENCY)
Dept: RADIOLOGY | Facility: MEDICAL CENTER | Age: 34
End: 2024-09-08
Attending: EMERGENCY MEDICINE
Payer: MEDICARE

## 2024-09-08 ENCOUNTER — HOSPITAL ENCOUNTER (EMERGENCY)
Facility: MEDICAL CENTER | Age: 34
End: 2024-09-08
Attending: EMERGENCY MEDICINE
Payer: MEDICARE

## 2024-09-08 ENCOUNTER — OFFICE VISIT (OUTPATIENT)
Dept: URGENT CARE | Facility: CLINIC | Age: 34
End: 2024-09-08
Payer: MEDICARE

## 2024-09-08 VITALS
TEMPERATURE: 98.7 F | SYSTOLIC BLOOD PRESSURE: 123 MMHG | WEIGHT: 263.67 LBS | RESPIRATION RATE: 23 BRPM | BODY MASS INDEX: 45.01 KG/M2 | HEIGHT: 64 IN | OXYGEN SATURATION: 95 % | DIASTOLIC BLOOD PRESSURE: 69 MMHG | HEART RATE: 78 BPM

## 2024-09-08 VITALS
BODY MASS INDEX: 44.39 KG/M2 | WEIGHT: 260 LBS | DIASTOLIC BLOOD PRESSURE: 68 MMHG | OXYGEN SATURATION: 99 % | RESPIRATION RATE: 14 BRPM | SYSTOLIC BLOOD PRESSURE: 122 MMHG | HEIGHT: 64 IN | HEART RATE: 75 BPM | TEMPERATURE: 97.4 F

## 2024-09-08 DIAGNOSIS — R06.02 SHORTNESS OF BREATH: ICD-10-CM

## 2024-09-08 DIAGNOSIS — L02.91 ABSCESS: ICD-10-CM

## 2024-09-08 LAB
ALBUMIN SERPL BCP-MCNC: 4.7 G/DL (ref 3.2–4.9)
ALBUMIN/GLOB SERPL: 2.2 G/DL
ALP SERPL-CCNC: 81 U/L (ref 30–99)
ALT SERPL-CCNC: 32 U/L (ref 2–50)
ANION GAP SERPL CALC-SCNC: 15 MMOL/L (ref 7–16)
APTT PPP: 26.3 SEC (ref 24.7–36)
AST SERPL-CCNC: 19 U/L (ref 12–45)
BACTERIA WND AEROBE CULT: ABNORMAL
BACTERIA WND AEROBE CULT: ABNORMAL
BASOPHILS # BLD AUTO: 0.7 % (ref 0–1.8)
BASOPHILS # BLD: 0.05 K/UL (ref 0–0.12)
BILIRUB SERPL-MCNC: 0.3 MG/DL (ref 0.1–1.5)
BUN SERPL-MCNC: 15 MG/DL (ref 8–22)
CALCIUM ALBUM COR SERPL-MCNC: 9.2 MG/DL (ref 8.5–10.5)
CALCIUM SERPL-MCNC: 9.8 MG/DL (ref 8.5–10.5)
CHLORIDE SERPL-SCNC: 108 MMOL/L (ref 96–112)
CO2 SERPL-SCNC: 16 MMOL/L (ref 20–33)
CREAT SERPL-MCNC: 0.62 MG/DL (ref 0.5–1.4)
EKG IMPRESSION: NORMAL
EOSINOPHIL # BLD AUTO: 0.23 K/UL (ref 0–0.51)
EOSINOPHIL NFR BLD: 3.3 % (ref 0–6.9)
ERYTHROCYTE [DISTWIDTH] IN BLOOD BY AUTOMATED COUNT: 41.6 FL (ref 35.9–50)
GFR SERPLBLD CREATININE-BSD FMLA CKD-EPI: 119 ML/MIN/1.73 M 2
GLOBULIN SER CALC-MCNC: 2.1 G/DL (ref 1.9–3.5)
GLUCOSE SERPL-MCNC: 131 MG/DL (ref 65–99)
GRAM STN SPEC: ABNORMAL
HCT VFR BLD AUTO: 40.4 % (ref 37–47)
HGB BLD-MCNC: 14 G/DL (ref 12–16)
IMM GRANULOCYTES # BLD AUTO: 0.03 K/UL (ref 0–0.11)
IMM GRANULOCYTES NFR BLD AUTO: 0.4 % (ref 0–0.9)
INR PPP: 0.98 (ref 0.87–1.13)
LYMPHOCYTES # BLD AUTO: 2.07 K/UL (ref 1–4.8)
LYMPHOCYTES NFR BLD: 30 % (ref 22–41)
MCH RBC QN AUTO: 31.4 PG (ref 27–33)
MCHC RBC AUTO-ENTMCNC: 34.7 G/DL (ref 32.2–35.5)
MCV RBC AUTO: 90.6 FL (ref 81.4–97.8)
MONOCYTES # BLD AUTO: 0.56 K/UL (ref 0–0.85)
MONOCYTES NFR BLD AUTO: 8.1 % (ref 0–13.4)
NEUTROPHILS # BLD AUTO: 3.96 K/UL (ref 1.82–7.42)
NEUTROPHILS NFR BLD: 57.5 % (ref 44–72)
NRBC # BLD AUTO: 0 K/UL
NRBC BLD-RTO: 0 /100 WBC (ref 0–0.2)
NT-PROBNP SERPL IA-MCNC: 57 PG/ML (ref 0–125)
PLATELET # BLD AUTO: 202 K/UL (ref 164–446)
PMV BLD AUTO: 11.2 FL (ref 9–12.9)
POTASSIUM SERPL-SCNC: 4 MMOL/L (ref 3.6–5.5)
PROT SERPL-MCNC: 6.8 G/DL (ref 6–8.2)
PROTHROMBIN TIME: 13.1 SEC (ref 12–14.6)
RBC # BLD AUTO: 4.46 M/UL (ref 4.2–5.4)
SIGNIFICANT IND 70042: ABNORMAL
SITE SITE: ABNORMAL
SODIUM SERPL-SCNC: 139 MMOL/L (ref 135–145)
SOURCE SOURCE: ABNORMAL
TROPONIN T SERPL-MCNC: 8 NG/L (ref 6–19)
WBC # BLD AUTO: 6.9 K/UL (ref 4.8–10.8)

## 2024-09-08 PROCEDURE — 80053 COMPREHEN METABOLIC PANEL: CPT

## 2024-09-08 PROCEDURE — 3078F DIAST BP <80 MM HG: CPT | Performed by: PHYSICIAN ASSISTANT

## 2024-09-08 PROCEDURE — 85730 THROMBOPLASTIN TIME PARTIAL: CPT

## 2024-09-08 PROCEDURE — 36415 COLL VENOUS BLD VENIPUNCTURE: CPT

## 2024-09-08 PROCEDURE — 83880 ASSAY OF NATRIURETIC PEPTIDE: CPT

## 2024-09-08 PROCEDURE — 99284 EMERGENCY DEPT VISIT MOD MDM: CPT

## 2024-09-08 PROCEDURE — 700117 HCHG RX CONTRAST REV CODE 255: Performed by: EMERGENCY MEDICINE

## 2024-09-08 PROCEDURE — 85610 PROTHROMBIN TIME: CPT

## 2024-09-08 PROCEDURE — 84484 ASSAY OF TROPONIN QUANT: CPT

## 2024-09-08 PROCEDURE — 85025 COMPLETE CBC W/AUTO DIFF WBC: CPT

## 2024-09-08 PROCEDURE — 93005 ELECTROCARDIOGRAM TRACING: CPT | Performed by: EMERGENCY MEDICINE

## 2024-09-08 PROCEDURE — 93005 ELECTROCARDIOGRAM TRACING: CPT

## 2024-09-08 PROCEDURE — 3074F SYST BP LT 130 MM HG: CPT | Performed by: PHYSICIAN ASSISTANT

## 2024-09-08 PROCEDURE — 71275 CT ANGIOGRAPHY CHEST: CPT

## 2024-09-08 PROCEDURE — 99214 OFFICE O/P EST MOD 30 MIN: CPT | Performed by: PHYSICIAN ASSISTANT

## 2024-09-08 RX ADMIN — IOHEXOL 65 ML: 350 INJECTION, SOLUTION INTRAVENOUS at 18:15

## 2024-09-08 ASSESSMENT — FIBROSIS 4 INDEX
FIB4 SCORE: 0.54
FIB4 SCORE: 0.54

## 2024-09-08 ASSESSMENT — PAIN DESCRIPTION - PAIN TYPE: TYPE: ACUTE PAIN

## 2024-09-08 NOTE — PROGRESS NOTES
Subjective     Kristin Balderrama is a 33 y.o. female who presents with Abscess (Last visit 9.6.24. Abscess has not gotten better, SOB. Patient states that she had the abscess on left arm drained on Friday and it still feels funny, patient also states that she is currently out of breath. 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  MEDS:   Current Outpatient Medications:     amoxicillin-clavulanate (AUGMENTIN) 875-125 MG Tab, Take 1 Tablet by mouth 2 times a day for 7 days., Disp: 14 Tablet, Rfl: 0    metoprolol SR (TOPROL XL) 25 MG TABLET SR 24 HR, Take 1 Tablet by mouth 2 times a day., Disp: 200 Tablet, Rfl: 3    Galcanezumab-gnlm (EMGALITY) 120 MG/ML Solution Auto-injector, Inject 1 mL subcutaneously every month., Disp: 1 mL, Rfl: 11    lamoTRIgine (LAMICTAL) 25 MG Tab, Take 2 Tablets by mouth 2 times a day., Disp: 360 Tablet, Rfl: 1    Rimegepant Sulfate (NURTEC) 75 MG TABLET DISPERSIBLE, Take 1 tablet by mouth at onset of migraine or aura okay to repeat in 24 hours if needed., Disp: 8 Tablet, Rfl: 5    ondansetron (ZOFRAN ODT) 4 MG TABLET DISPERSIBLE,  Take 1-2 tablets by mouth at onset of nausea or vomiting okay to repeat in 12 hours if needed, Disp: 20 Tablet, Rfl: 2    sumatriptan (IMITREX) 20 MG/ACT nasal spray, Give 1 dose at onset headache okay to repeat in 2 hours if needed for max of 2 doses in a 24 hour timeframe., Disp: 6 Each, Rfl: 5    ivabradine (CORLANOR) 7.5 MG Tab tablet, Take 1 Tablet by mouth 2 times a day with meals., Disp: 60 Tablet, Rfl: 3    diphenhydrAMINE (BENADRYL) 25 MG Tab, Take 25-50 mg by mouth 2 times a day. Scheduled  25 mg = AM 50 MG = PM, Disp: , Rfl:     Melatonin 10 MG Tab, Take 10 mg by mouth at bedtime., Disp: , Rfl:     sumatriptan (IMITREX) 5 MG/ACT nasal spray, Administer 1 Spray into affected nostril(S) as needed. Indications: Migraine Headache, Disp: , Rfl:     topiramate (TOPAMAX) 50 MG tablet, Take 50 mg by mouth 2 times a day., Disp: , Rfl:     spironolactone (ALDACTONE) 25 MG Tab, 25 mg every day., Disp: , Rfl:     ibuprofen (MOTRIN) 800 MG Tab, Take 1 Tablet by mouth every 8 hours as needed for Mild Pain., Disp: 30 Tablet, Rfl: 0    loperamide (IMODIUM) 2 MG Cap, Take 1 Capsule by mouth 4 times a day as needed for Diarrhea., Disp: 30 Capsule, Rfl: 0    adalimumab (HUMIRA) 80 MG/0.8ML injection, Inject 80 mg under the skin every 14 days. Last injection was on 3/14/2024, Disp: , Rfl:     amLODIPine (NORVASC) 5 MG Tab, Take 5 mg by mouth every day., Disp: , Rfl:     Rimegepant Sulfate (NURTEC) 75 MG TABLET DISPERSIBLE, Take 75 mg by mouth 1 time a day as needed (migraine)., Disp: , Rfl:     gabapentin (NEURONTIN) 400 MG Cap, Take 1,200 mg by mouth 3 times a day. 3 capsules=1200mg, Disp: , Rfl:     omeprazole (PRILOSEC) 20 MG delayed-release capsule, Take 20 mg by mouth 2 times a day., Disp: , Rfl:     lamoTRIgine (LAMICTAL) 25 MG Tab, Take 50 mg by mouth 2 times a day. 25 mg x 2 tablets = 50 mg, Disp: , Rfl:     Galcanezumab-gnlm (EMGALITY) 120 MG/ML Solution Auto-injector, Inject 120 mg under the skin every 30 (thirty)  "days. On the 27th of every month, last dose was taken on 2/27/2024, Disp: , Rfl:     ziprasidone (GEODON) 40 MG Cap, Take 1 capsule by mouth at bedtime., Disp: 30 Capsule, Rfl: 2    etonogestrel (NEXPLANON) 68 MG Implant implant, Inject 68 mg under the skin see administration instructions. Implant in left arm Every 3 years to change, thinks it was placed 2021, Disp: , Rfl:     traMADol (ULTRAM) 50 MG Tab, Take 1 Tablet by mouth every 6 hours as needed for Moderate Pain for up to 5 days., Disp: 20 Tablet, Rfl: 0  ALLERGIES:   Allergies   Allergen Reactions    Cefdinir Shortness of Breath and Itching     Tolerated 1/18/17  Tolerates ceftriaxone  Tolerated augmentin 8/2019     Depakote [Divalproex Sodium] Unspecified     Muscle spasms/muscle pain and weakness      Depakote [Divalproex Sodium]      Muscle spasms/muscle pain and weakness    Doxycycline Anaphylaxis and Vomiting     Other reaction(s): pustules/blisters  Other reaction(s): stomach pain    Montelukast      Cardiac effusion    Montelukast [Singulair] Unspecified     Cardiac effusion    Vancomycin Itching     Pt becomes flushed in face and chest.   RXN=7/10/16    Wound Dressing Adhesive Rash     By pt report-\"removes skin totally off\"    Amitriptyline Unspecified     Headaches      Amoxicillin Rash          Aripiprazole Unspecified     Headaches/muscle twitching      Aripiprazole [Abilify] Unspecified     Headaches/muscle twitching      Clindamycin Nausea         Other reaction(s): nausea stomach pain    Erythromycin Rash     .  Other reaction(s): nausea stomach pain    Flomax [Tamsulosin Hydrochloride] Swelling    Hydromorphone      Other reaction(s): vomiting    Levaquin Unspecified     Severe muscle cramps in legs  RXN=unknown  Other reaction(s): leg muscle cramps    Metformin Unspecified     Increased lactic acid     Other reaction(s): itching and rash/nausea vomiting    Sulfa Drugs Hives, Itching, Myalgia and Unspecified     Muscle pain and " "weakness    Other reaction(s): unknown    Tamsulosin Swelling     Swelling of legs    Tape Rash     Tears skin off  coban with Tegaderm tape ok intermittently  RXN=ongoing    Sulfamethoxazole W-Trimethoprim Rash    Ampicillin Rash     Pt reports that she received a rash     Ciprofloxacin Rash          Keflex Rash     Pt states she gets a rash with this medication  Tolerates ceftriaxone  Can take with Benadryl    Levofloxacin Unspecified     Leg muscle cramps    Metronidazole Rash     \"Vision problems\"  Other reaction(s): vision problems    Penicillins Hives     Can take with Benadryl    Valproic Acid Rash     .     SURGHX:   Past Surgical History:   Procedure Laterality Date    NH CYSTOSCOPY,INSERT URETERAL STENT Right 2/12/2024    Procedure: CYSTOSCOPY, WITH RIGHT URETEROSCOPY, WITH LITHOTRIPSY, WITH INSERTION OF RIGHT URETERAL STENT;  Surgeon: Josafat Roberson M.D.;  Location: [facility];  Service: Urology    NH CYSTO/URETERO/PYELOSCOPY, DX Right 2/12/2024    Procedure: URETEROSCOPY;  Surgeon: Josafat Roberson M.D.;  Location: [facility];  Service: Urology    LASERTRIPSY Right 2/12/2024    Procedure: LITHOTRIPSY, USING LASER;  Surgeon: Josafat Roberson M.D.;  Location: [facility];  Service: Urology    INGUINAL HERNIA LAPAROSCOPIC Right 07/21/2023    Procedure: LAPAROSCOPIC RIGHT INGUINAL HERNIA REPAIR WITH MESH;  Surgeon: Joe Noyola M.D.;  Location: [facility];  Service: General    HERNIA REPAIR Right 07/21/2023    PB PERCUT FIX PBOX/NECK FEMUR FX Left 01/28/2023    Procedure: FIXATION, HIP, USING CANNULATED SCREW;  Surgeon: Noman Abdul M.D.;  Location: SURGERY [facility];  Service: Orthopedics    NH LAP,DIAGNOSTIC ABDOMEN  02/14/2022    Procedure: LAPAROSCOPY;  Surgeon: Seamus Pisano M.D.;  Location: SURGERY SAME DAY [facility];  Service: Gynecology    OVARIAN CYSTECTOMY Right 02/14/2022    Procedure: EXCISION, CYST, OVARY;  Surgeon: Seamus Pisano M.D.;  " Location: SURGERY SAME DAY [facility];  Service: Gynecology    BOWEL STIMULATOR INSERTION  03/10/2021    Procedure: INSERTION, ELECTRODE LEADS AND PULSE GENERATOR, NEUROSTIMULATOR, SACRAL - REMOVAL OF INTERSTIM WITH REPLACEMENT OF SACRAL NEUROMODULATION DEVICE;  Surgeon: Joe Noyola M.D.;  Location: SURGERY [facility];  Service: General    MUSCLE BIOPSY Right 01/26/2017    Procedure: MUSCLE BIOPSY - THIGH;  Surgeon: Isidro Vigil M.D.;  Location: SURGERY [facility];  Service:     GASTROSCOPY WITH BALLOON DILATATION N/A 01/04/2017    Procedure: GASTROSCOPY WITH DILATATION;  Surgeon: Torres Vargas M.D.;  Location: SURGERY [facility];  Service:     BOWEL STIMULATOR INSERTION  07/13/2016    Procedure: BOWEL STIMULATOR INSERTION FOR PERMANENT INTERSTIM SACRAL IMPLANT;  Surgeon: Joe Noyola M.D.;  Location: SURGERY [facility];  Service:     RECOVERY  01/27/2016    Procedure: CATH LAB EP STUDY WITH SINUS NODE MODIFICATION LA;  Surgeon: [facility];  Location: SURGERY [facility];  Service:     OTHER CARDIAC SURGERY  01/2016    cardiac ablation    NEURO DEST FACET L/S W/IG SNGL  04/21/2015    Performed by Reza Tabor at SURGERY [facility]    LUMBAR FUSION ANTERIOR  08/21/2012    Performed by SUSIE SAWANT at SURGERY [facility]    ALYSSA BY LAPAROSCOPY  08/29/2010    Performed by SHAYY JOHNS at SURGERY [facility]    LAMINOTOMY      OTHER ABDOMINAL SURGERY      TONSILLECTOMY      tonsillectomy     SOCHX:  reports that she has never smoked. She has never used smokeless tobacco. She reports that she does not drink alcohol and does not use drugs.  FH: Reviewed with patient, not pertinent to this visit.           Patient presents with:  Abscess: Last visit 9.6.24. Abscess has not gotten better,   Patient states that she had the abscess on left arm drained on Friday and it still feels funny, and left arm is very swollen, tender and painful in upper arm and down  "toward elbow.  Patient also states that she is currently out of breath,  has history of asthma, but is worse than usual.  PT is concerned about a possible blood clot in her arm.  PT denies hx of blood clot.  Pt denies other complaints.         Arm Pain   The incident occurred 3 to 5 days ago. Injury mechanism: I and D of abscess. The pain is present in the upper left arm. The quality of the pain is described as aching. The pain is moderate. The pain has been Constant since the incident. Associated symptoms include tingling. Pertinent negatives include no chest pain, muscle weakness or numbness. The symptoms are aggravated by movement, lifting and palpation. She has tried rest, heat and ice for the symptoms. The treatment provided no relief.       Review of Systems   Respiratory:  Positive for shortness of breath. Negative for sputum production.    Cardiovascular:  Negative for chest pain.   Musculoskeletal:         Left upper arm pain   Neurological:  Positive for tingling. Negative for numbness.   All other systems reviewed and are negative.             Objective     /68   Pulse 75   Temp 36.3 °C (97.4 °F) (Temporal)   Resp 14   Ht 1.626 m (5' 4\")   Wt 118 kg (260 lb)   SpO2 99%   BMI 44.63 kg/m²      Physical Exam  Vitals and nursing note reviewed.   Constitutional:       General: She is not in acute distress.     Appearance: Normal appearance. She is well-developed. She is not ill-appearing or toxic-appearing.   HENT:      Head: Normocephalic and atraumatic.      Right Ear: Tympanic membrane normal.      Left Ear: Tympanic membrane normal.      Nose: Nose normal.      Mouth/Throat:      Lips: Pink.      Mouth: Mucous membranes are moist.      Pharynx: Oropharynx is clear. Uvula midline.   Eyes:      Extraocular Movements: Extraocular movements intact.      Conjunctiva/sclera: Conjunctivae normal.      Pupils: Pupils are equal, round, and reactive to light.   Cardiovascular:      Rate and Rhythm: " Normal rate and regular rhythm.      Pulses: Normal pulses.      Heart sounds: Normal heart sounds.   Pulmonary:      Effort: Pulmonary effort is normal.      Breath sounds: Normal breath sounds. No wheezing, rhonchi or rales.      Comments: PT is speaking in a breathless manner, states sob  Chest:      Chest wall: No tenderness.   Abdominal:      General: Bowel sounds are normal.      Palpations: Abdomen is soft.   Musculoskeletal:         General: Normal range of motion.        Arms:       Cervical back: Normal range of motion and neck supple.   Skin:     General: Skin is warm and dry.      Capillary Refill: Capillary refill takes less than 2 seconds.   Neurological:      General: No focal deficit present.      Mental Status: She is alert and oriented to person, place, and time.      Cranial Nerves: No cranial nerve deficit.      Motor: Motor function is intact.      Coordination: Coordination is intact.      Gait: Gait normal.   Psychiatric:         Mood and Affect: Mood normal.                             Assessment & Plan        Assessment & Plan  Abscess    Orders:    amoxicillin-clavulanate (AUGMENTIN) 875-125 MG Tab; Take 1 Tablet by mouth 2 times a day for 7 days.                PT to discontinue Doxycycline today, begin augmentin with Benadryl today x 7 days.  Change based on culture results.    PT complaint of shortness of breath, left upper arm pain tenderness and swelling that extends towards the elbow.  Patient denies history of PE or DVT but patient has a complicated past medical history.  Unfortunately she is here on a Sunday, I cannot do any stat labs or advanced imaging to rule out DVT or PE.  Because of this I have encouraged patient to be seen in the emergency department for further evaluation.    Differential diagnosis, supportive care, and indications for immediate follow-up discussed with patient.  Instructed to return to clinic or nearest emergency department for any change in condition,  further concerns, or worsening of symptoms.    I personally reviewed prior external notes and test results pertinent to today's visit.  I have independently reviewed and interpreted all diagnostics ordered during this urgent care visit.    PT should follow up with PCP in 1-2 days for re-evaluation if symptoms have not improved.      Discussed red flags and reasons to return to UC or ED.      Pt and/or family verbalized understanding of diagnosis and follow up instructions and was offered informational handout on diagnosis.  PT discharged.     Please note that this dictation was created using voice recognition software. I have made every reasonable attempt to correct obvious errors, but I expect that there may be errors of grammar and possibly content that I did not discover before finalizing the note.

## 2024-09-08 NOTE — ED TRIAGE NOTES
"Chief Complaint   Patient presents with    Shortness of Breath     Started today.  wants pt to be ruled out for blood clot. Pt denies hx of blood clots.     Arm Pain     L arm pain. Pt had abscess drained under her L arm on Friday 9/6 and was placed on antibiotics at . Pt developed SOB and reports her L arm is starting to go numb.          Pt ambulated with cane to triage for above complaint.  Pt is AO x 4, follows commands, and responds appropriately to questions. Patient's breathing is mildly labored and pain is currently 7/10 on the 0-10 pain scale.  Pt placed in lobby. Patient educated on triage process and encouraged to alert staff for any changes.      BP (!) 164/109   Pulse 88   Temp 36.3 °C (97.4 °F) (Temporal)   Resp 16   Ht 1.626 m (5' 4\")   Wt 120 kg (263 lb 10.7 oz)   SpO2 100%     "

## 2024-09-08 NOTE — ED PROVIDER NOTES
ED Provider Note    CHIEF COMPLAINT  Chief Complaint   Patient presents with    Shortness of Breath     Started today.  wants pt to be ruled out for blood clot. Pt denies hx of blood clots.     Arm Pain     L arm pain. Pt had abscess drained under her L arm on Friday 9/6 and was placed on antibiotics at . Pt developed SOB and reports her L arm is starting to go numb.        EXTERNAL RECORDS REVIEWED  Outpatient labs & studies recent abscess drainage of the left axilla with Proteus Mirabella's this Augmentin sensitive    HPI/ROS  LIMITATION TO HISTORY     OUTSIDE HISTORIAN(S):      Kristin Balderrama is a 33 y.o. female who presents directed from urgent care for possible blood clot.  Patient gone to the urgent care for having increased arm pain after abscess drainage.  There was concern of the extent of the swelling in her arm and also patient was having some chest pains and shortness of breath.  No lower extremity pain or swelling she has had no fevers since the drainage she has taken 1 dose of Augmentin.  No abdominal pain no problems with urination or bowel movements no other acute symptom change or concern at this time.    PAST MEDICAL HISTORY   has a past medical history of Abdominal pain, Anginal syndrome, Apnea, sleep, Arthritis, ASTHMA, Back pain, Borderline personality disorder (Formerly [facility]), Chickenpox, Chronic UTI (09/18/2010), Daytime sleepiness, Dental disorder (03/08/2021), Depression, Diabetes (Formerly [facility]), Diarrhea, Disorder of thyroid, Fatigue, Frequent headaches, Heart burn, History of falling, Inappropriate sinus tachycardia (Formerly [facility]) (2016), Migraine, Mitochondrial disease (Formerly [facility]), Multiple personality disorder (Formerly [facility]), Obesity, Pain, Painful joint, PCOS (polycystic ovarian syndrome), Pneumonia (2012 12/2020), POTS (postural orthostatic tachycardia syndrome), Psychosis (Formerly [facility]), Ringing in ears, Scoliosis, Shortness of breath, Sinus tachycardia (10/31/2013), Sleep apnea, Snoring, Supraventricular tachycardia (Formerly [facility])  (04/10/2019), Transverse myelitis (HCC), Tuberculosis, Urinary bladder disorder, Urinary incontinence, Weakness, and Wears glasses.    SURGICAL HISTORY   has a past surgical history that includes neuro dest facet l/s w/ig sngl (04/21/2015); recovery (01/27/2016); [place] by laparoscopy (08/29/2010); lumbar fusion anterior (08/21/2012); other cardiac surgery (01/2016); tonsillectomy; bowel stimulator insertion (07/13/2016); gastroscopy with balloon dilatation (N/A, 01/04/2017); muscle biopsy (Right, 01/26/2017); other abdominal surgery; laminotomy; bowel stimulator insertion (03/10/2021); lap,diagnostic abdomen (02/14/2022); ovarian cystectomy (Right, 02/14/2022); percut fix prox/neck femur fx (Left, 01/28/2023); inguinal hernia laparoscopic (Right, 07/21/2023); hernia repair (Right, 07/21/2023); cystoscopy,insert ureteral stent (Right, 2/12/2024); cysto/uretero/pyeloscopy, dx (Right, 2/12/2024); and lasertripsy (Right, 2/12/2024).    FAMILY HISTORY  Family History   Problem Relation Age of Onset    Hypertension Mother     Sleep Apnea Mother     Heart Disease Mother     Other Mother         hypothryod    Hypertension Maternal Uncle     Heart Disease Maternal Grandmother     Hypertension Maternal Grandmother     No Known Problems Sister     Other Sister         Narcolepsy;fibromyalsia;bone;nerve    Genitourinary () Problems Sister         endometriosis       SOCIAL HISTORY  Social History     Tobacco Use    Smoking status: Never    Smokeless tobacco: Never   Vaping Use    Vaping status: Never Used   Substance and Sexual Activity    Alcohol use: No     Alcohol/week: 0.0 oz    Drug use: Never     Frequency: 7.0 times per week    Sexual activity: Not Currently     Birth control/protection: Implant       CURRENT MEDICATIONS  Home Medications       Reviewed by Rachael Hinson R.N. (Registered Nurse) on 09/08/24 at 1424  Med List Status: Partial     Medication Last Dose Status   adalimumab (HUMIRA) 80 MG/0.8ML injection   "Active   amLODIPine (NORVASC) 5 MG Tab  Active   amoxicillin-clavulanate (AUGMENTIN) 875-125 MG Tab  Active   diphenhydrAMINE (BENADRYL) 25 MG Tab  Active   etonogestrel (NEXPLANON) 68 MG Implant implant  Active   gabapentin (NEURONTIN) 400 MG Cap  Active   Galcanezumab-gnlm (EMGALITY) 120 MG/ML Solution Auto-injector  Active   Galcanezumab-gnlm (EMGALITY) 120 MG/ML Solution Auto-injector  Active   ibuprofen (MOTRIN) 800 MG Tab  Active   ivabradine (CORLANOR) 7.5 MG Tab tablet  Active   lamoTRIgine (LAMICTAL) 25 MG Tab  Active   lamoTRIgine (LAMICTAL) 25 MG Tab  Active   loperamide (IMODIUM) 2 MG Cap  Active   Melatonin 10 MG Tab  Active   metoprolol SR (TOPROL XL) 25 MG TABLET SR 24 HR  Active   omeprazole (PRILOSEC) 20 MG delayed-release capsule  Active   ondansetron (ZOFRAN ODT) 4 MG TABLET DISPERSIBLE  Active   Rimegepant Sulfate (NURTEC) 75 MG TABLET DISPERSIBLE  Active   Rimegepant Sulfate (NURTEC) 75 MG TABLET DISPERSIBLE  Active   spironolactone (ALDACTONE) 25 MG Tab  Active   sumatriptan (IMITREX) 20 MG/ACT nasal spray  Active   sumatriptan (IMITREX) 5 MG/ACT nasal spray  Active   topiramate (TOPAMAX) 50 MG tablet  Active   ziprasidone (GEODON) 40 MG Cap  Active                    ALLERGIES  Allergies   Allergen Reactions    Cefdinir Shortness of Breath and Itching     Tolerated 1/18/17  Tolerates ceftriaxone  Tolerated augmentin 8/2019     Depakote [Divalproex Sodium] Unspecified     Muscle spasms/muscle pain and weakness      Depakote [Divalproex Sodium]      Muscle spasms/muscle pain and weakness    Doxycycline Anaphylaxis and Vomiting     Other reaction(s): pustules/blisters  Other reaction(s): stomach pain    Montelukast      Cardiac effusion    Montelukast [Singulair] Unspecified     Cardiac effusion    Vancomycin Itching     Pt becomes flushed in face and chest.   RXN=7/10/16    Wound Dressing Adhesive Rash     By pt report-\"removes skin totally off\"    Amitriptyline Unspecified     Headaches      " "Amoxicillin Rash          Aripiprazole Unspecified     Headaches/muscle twitching      Aripiprazole [Abilify] Unspecified     Headaches/muscle twitching      Clindamycin Nausea         Other reaction(s): nausea stomach pain    Erythromycin Rash     .  Other reaction(s): nausea stomach pain    Flomax [Tamsulosin Hydrochloride] Swelling    Hydromorphone      Other reaction(s): vomiting    Levaquin Unspecified     Severe muscle cramps in legs  RXN=unknown  Other reaction(s): leg muscle cramps    Metformin Unspecified     Increased lactic acid     Other reaction(s): itching and rash/nausea vomiting    Sulfa Drugs Hives, Itching, Myalgia and Unspecified     Muscle pain and weakness    Other reaction(s): unknown    Tamsulosin Swelling     Swelling of legs    Tape Rash     Tears skin off  coban with Tegaderm tape ok intermittently  RXN=ongoing    Sulfamethoxazole W-Trimethoprim Rash    Ampicillin Rash     Pt reports that she received a rash     Ciprofloxacin Rash          Keflex Rash     Pt states she gets a rash with this medication  Tolerates ceftriaxone  Can take with Benadryl    Levofloxacin Unspecified     Leg muscle cramps    Metronidazole Rash     \"Vision problems\"  Other reaction(s): vision problems    Penicillins Hives     Can take with Benadryl    Valproic Acid Rash     .       PHYSICAL EXAM  VITAL SIGNS: /69   Pulse 85   Temp 36.3 °C (97.4 °F) (Temporal)   Resp (!) 22   Ht 1.626 m (5' 4\")   Wt 120 kg (263 lb 10.7 oz)   SpO2 94%   BMI 45.26 kg/m²      Pulse ox interpretation: I interpret this pulse ox as normal.  Constitutional: Alert and oriented x 3, minimal distress  HEENT: Atraumatic normocephalic, pupils are equal round reactive to light extraocular movements are intact. The nares is clear, external ears are normal, mouth shows moist mucous membranes normal dentition for age  Neck: Supple, no JVD no tracheal deviation  Cardiovascular: Regular rate and rhythm no murmur rub or gallop 2+ pulses " peripherally x4  Thorax & Lungs: No respiratory distress, no wheezes rales or rhonchi, No chest tenderness.   GI: Morbid obesity soft nontender nondistended positive bowel sounds, no peritoneal signs  Skin: Evidence of recent I&D in the left axilla without ongoing fluctuance or drainage  Musculoskeletal: Right upper bilateral lower unremarkable left upper she has tenderness to palpation in the axilla and she has some minimal distal edema.  Neurologic: Cranial nerves III through XII are grossly intact no sensory deficit no cerebellar dysfunction   Psychiatric: Appropriate affect for situation at this time      EKG/LABS  Results for orders placed or performed during the hospital encounter of 09/08/24   CBC w/ Differential   Result Value Ref Range    WBC 6.9 4.8 - 10.8 K/uL    RBC 4.46 4.20 - 5.40 M/uL    Hemoglobin 14.0 12.0 - 16.0 g/dL    Hematocrit 40.4 37.0 - 47.0 %    MCV 90.6 81.4 - 97.8 fL    MCH 31.4 27.0 - 33.0 pg    MCHC 34.7 32.2 - 35.5 g/dL    RDW 41.6 35.9 - 50.0 fL    Platelet Count 202 164 - 446 K/uL    MPV 11.2 9.0 - 12.9 fL    Neutrophils-Polys 57.50 44.00 - 72.00 %    Lymphocytes 30.00 22.00 - 41.00 %    Monocytes 8.10 0.00 - 13.40 %    Eosinophils 3.30 0.00 - 6.90 %    Basophils 0.70 0.00 - 1.80 %    Immature Granulocytes 0.40 0.00 - 0.90 %    Nucleated RBC 0.00 0.00 - 0.20 /100 WBC    Neutrophils (Absolute) 3.96 1.82 - 7.42 K/uL    Lymphs (Absolute) 2.07 1.00 - 4.80 K/uL    Monos (Absolute) 0.56 0.00 - 0.85 K/uL    Eos (Absolute) 0.23 0.00 - 0.51 K/uL    Baso (Absolute) 0.05 0.00 - 0.12 K/uL    Immature Granulocytes (abs) 0.03 0.00 - 0.11 K/uL    NRBC (Absolute) 0.00 K/uL   Complete Metabolic Panel (CMP)   Result Value Ref Range    Sodium 139 135 - 145 mmol/L    Potassium 4.0 3.6 - 5.5 mmol/L    Chloride 108 96 - 112 mmol/L    Co2 16 (L) 20 - 33 mmol/L    Anion Gap 15.0 7.0 - 16.0    Glucose 131 (H) 65 - 99 mg/dL    Bun 15 8 - 22 mg/dL    Creatinine 0.62 0.50 - 1.40 mg/dL    Calcium 9.8 8.5 - 10.5  mg/dL    Correct Calcium 9.2 8.5 - 10.5 mg/dL    AST(SGOT) 19 12 - 45 U/L    ALT(SGPT) 32 2 - 50 U/L    Alkaline Phosphatase 81 30 - 99 U/L    Total Bilirubin 0.3 0.1 - 1.5 mg/dL    Albumin 4.7 3.2 - 4.9 g/dL    Total Protein 6.8 6.0 - 8.2 g/dL    Globulin 2.1 1.9 - 3.5 g/dL    A-G Ratio 2.2 g/dL   proBrain Natriuretic Peptide, NT   Result Value Ref Range    NT-proBNP 57 0 - 125 pg/mL   Troponin - STAT Once   Result Value Ref Range    Troponin T 8 6 - 19 ng/L   PT/INR   Result Value Ref Range    PT 13.1 12.0 - 14.6 sec    INR 0.98 0.87 - 1.13   APTT   Result Value Ref Range    APTT 26.3 24.7 - 36.0 sec   ESTIMATED GFR   Result Value Ref Range    GFR (CKD-EPI) 119 >60 mL/min/1.73 m 2   EKG   Result Value Ref Range    Report       [facility] Emergency Dept.    Test Date:  2024  Pt Name:    HARLEY DE LA CRUZ              Department: ER  MRN:        [id number]                      Room:        13  Gender:     Female                       Technician: [id number]  :        1990                   Requested By:ER TRIAGE PROTOCOL  Order #:    [id number]                    Reading MD: EDA GARCIA MD    Measurements  Intervals                                Axis  Rate:       83                           P:          21  CA:         140                          QRS:        21  QRSD:       96                           T:          44  QT:         470  QTc:        553    Interpretive Statements  Sinus rhythm  Borderline T abnormalities, anterior leads  Prolonged QT interval  Compared to ECG 2024 11:00:02  No significant changes  Electronically Signed On 2024 23:06:58 PDT by EDA GARCIA MD       *Note: Due to a large number of results and/or encounters for the requested time period, some results have not been displayed. A complete set of results can be found in Results Review.      I have independently interpreted this EKG    RADIOLOGY/PROCEDURES   I have independently interpreted  the diagnostic imaging associated with this visit and am waiting the final reading from the radiologist.   My preliminary interpretation is as follows:   No obvious PE    Radiologist interpretation:  CT-CTA CHEST PULMONARY ARTERY W/ RECONS   Final Result      1.  No CT evidence of pulmonary embolism.      2.  Minimal scattered pulmonary opacifications are noted.                COURSE & MEDICAL DECISION MAKING    ASSESSMENT, COURSE AND PLAN  Care Narrative: CT negative for PE traps negative.  She does have some minor areas of groundglass opacities in the lungs.  On Augmentin for Proteus Mirabella's abscess drainage.  Given instructions to continue this.  We had ordered ultrasound of the left upper extremity to evaluate for DVT due to the concerns of the swelling recent procedure in her axilla however extremely busy day in the emergency department and ultrasound was taking quite some time.  Patient does not want to wait for this exam and would rather follow-up as outpatient.  My suspicion that she actually has a DVT in this arm is very low.  Minimal edema and this is likely secondary to recent I&D in her axilla.  Given instructions to return for worsening pain redness swelling drainage worsening chest pain shortness of breath any other acute symptom change or concerns otherwise continue with Augmentin and follow-up with PCP discharged in stable and improved condition.            ADDITIONAL PROBLEMS MANAGED      DISPOSITION AND DISCUSSIONS  I have discussed management of the patient with the following physicians and ARIES's:      Discussion of management with other QHP or appropriate source(s):      Escalation of care considered, and ultimately not performed:acute inpatient care management, however at this time, the patient is most appropriate for outpatient management    Barriers to care at this time, including but not limited to: .     Decision tools and prescription drugs considered including, but not limited to:  ".  /69   Pulse 78   Temp 37.1 °C (98.7 °F)   Resp (!) 23   Ht 1.626 m (5' 4\")   Wt 120 kg (263 lb 10.7 oz)   SpO2 95%   BMI 45.26 kg/m²     Torres Brody M.D.  [address]  [facility] [id number] [phone]          [facility], Emergency Dept  [address] [address] [phone]    in 12-24 hours if symptoms persist, immediately If symptoms worsen, or if you develop any other symptoms or concerns      FINAL DIAGNOSIS  1. Shortness of breath    2.  Peripheral edema left upper extremity  3.  atypical pneumonia    Electronically signed by: Bolivar Salcedo M.D.,       "

## 2024-09-09 ENCOUNTER — APPOINTMENT (OUTPATIENT)
Dept: RADIOLOGY | Facility: MEDICAL CENTER | Age: 34
End: 2024-09-09
Attending: STUDENT IN AN ORGANIZED HEALTH CARE EDUCATION/TRAINING PROGRAM
Payer: MEDICARE

## 2024-09-09 ENCOUNTER — HOSPITAL ENCOUNTER (EMERGENCY)
Facility: MEDICAL CENTER | Age: 34
End: 2024-09-09
Attending: STUDENT IN AN ORGANIZED HEALTH CARE EDUCATION/TRAINING PROGRAM
Payer: MEDICARE

## 2024-09-09 VITALS
BODY MASS INDEX: 45.17 KG/M2 | WEIGHT: 264.55 LBS | HEART RATE: 74 BPM | TEMPERATURE: 98.5 F | OXYGEN SATURATION: 96 % | HEIGHT: 64 IN | SYSTOLIC BLOOD PRESSURE: 129 MMHG | RESPIRATION RATE: 18 BRPM | DIASTOLIC BLOOD PRESSURE: 67 MMHG

## 2024-09-09 DIAGNOSIS — M25.551 RIGHT HIP PAIN: ICD-10-CM
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
## 2024-09-09 LAB — EKG IMPRESSION: NORMAL

## 2024-09-09 PROCEDURE — 99284 EMERGENCY DEPT VISIT MOD MDM: CPT

## 2024-09-09 PROCEDURE — 73501 X-RAY EXAM HIP UNI 1 VIEW: CPT | Mod: RT

## 2024-09-09 PROCEDURE — 96375 TX/PRO/DX INJ NEW DRUG ADDON: CPT

## 2024-09-09 PROCEDURE — 96374 THER/PROPH/DIAG INJ IV PUSH: CPT

## 2024-09-09 PROCEDURE — 93005 ELECTROCARDIOGRAM TRACING: CPT | Performed by: STUDENT IN AN ORGANIZED HEALTH CARE EDUCATION/TRAINING PROGRAM

## 2024-09-09 PROCEDURE — 96376 TX/PRO/DX INJ SAME DRUG ADON: CPT

## 2024-09-09 PROCEDURE — 700111 HCHG RX REV CODE 636 W/ 250 OVERRIDE (IP): Mod: JZ,UD | Performed by: STUDENT IN AN ORGANIZED HEALTH CARE EDUCATION/TRAINING PROGRAM

## 2024-09-09 RX ORDER — MORPHINE SULFATE 4 MG/ML
4 INJECTION INTRAVENOUS ONCE
Status: COMPLETED | OUTPATIENT
Start: 2024-09-09 | End: 2024-09-09

## 2024-09-09 RX ORDER — TRAMADOL HYDROCHLORIDE 50 MG/1
50 TABLET ORAL EVERY 6 HOURS PRN
Qty: 20 TABLET | Refills: 0 | Status: SHIPPED | OUTPATIENT
Start: 2024-09-09 | End: 2024-09-14

## 2024-09-09 RX ORDER — KETOROLAC TROMETHAMINE 15 MG/ML
15 INJECTION, SOLUTION INTRAMUSCULAR; INTRAVENOUS ONCE
Status: COMPLETED | OUTPATIENT
Start: 2024-09-09 | End: 2024-09-09

## 2024-09-09 RX ADMIN — MORPHINE SULFATE 4 MG: 4 INJECTION, SOLUTION INTRAMUSCULAR; INTRAVENOUS at 18:00

## 2024-09-09 RX ADMIN — MORPHINE SULFATE 4 MG: 4 INJECTION, SOLUTION INTRAMUSCULAR; INTRAVENOUS at 19:53

## 2024-09-09 RX ADMIN — KETOROLAC TROMETHAMINE 15 MG: 15 INJECTION, SOLUTION INTRAMUSCULAR; INTRAVENOUS at 19:49

## 2024-09-09 ASSESSMENT — PAIN DESCRIPTION - PAIN TYPE
TYPE: ACUTE PAIN
TYPE: ACUTE PAIN

## 2024-09-09 ASSESSMENT — FIBROSIS 4 INDEX: FIB4 SCORE: 0.57

## 2024-09-09 NOTE — ED NOTES
Bedside report received from off going RN/tech: Lizeth HASSAN, assumed care of patient.  POC discussed with patient. Call light within reach, all needs addressed at this time.       Fall risk interventions in place: Move the patient closer to the nurse's station, Patient's personal possessions are with in their safe reach, Place fall risk sign on patient's door, Give patient urinal if applicable, Keep floor surfaces clean and dry, and Accompanied to restroom (all applicable per [place] Fall risk assessment)   Continuous monitoring: Cardiac Leads, Pulse Ox, or Blood Pressure  IVF/IV medications: Not Applicable   Oxygen: Room Air  Bedside sitter: Not Applicable   Isolation: Not Applicable    Patient awaiting US.

## 2024-09-09 NOTE — ED NOTES
"Kristin Balderrama has been discharged from the Emergency Room.    IV discontinued and gauze bandage placed, pt in possession of belongings.    Discharge instructions, which include signs and symptoms to monitor patient for, as well as detailed information regarding SOB provided.  Patient verbalizes understanding of follow up care and medication management. All questions and concerns addressed at this time.     Patient provided with education on when to return to the ER and verbally understands with no concerns. Patient advised on setting up MyChart and information provided about patient survey.  Patient leaves ER in no apparent distress. This RN provided education regarding returning to the ER for any new concerns or changes in patient's condition.      /69   Pulse 78   Temp 37.1 °C (98.7 °F)   Resp (!) 23   Ht 1.626 m (5' 4\")   Wt 120 kg (263 lb 10.7 oz)   SpO2 95%   BMI 45.26 kg/m²   "

## 2024-09-09 NOTE — ED NOTES
Patient Identifiers verified; wheeled to room via Wheel chair; moved self to gurney from chair with support from staff; connected to BP, cardiac, and pulse ox monitoring; charted the patient for Emergency Room Physician to see.

## 2024-09-09 NOTE — ED TRIAGE NOTES
"Kristin Patti Balderrama  33 y.o. female  Chief Complaint   Patient presents with    GLF     Pt states \"I fell three times.\"    T-5000 Head Injury     Secondary to fall  - LOC  - anticoagulants/platelets    Hip Injury     Pt fell on her right side and injured her right hip       Pt to triage  via wheelchair for above complaint.  Pt is alert and oriented, speaking in full sentences, follows commands and responds appropriately to questions. Not in any apparent distress. Respirations are even and unlabored.  Pt placed in ED Lobby. Pt educated on triage process. Pt encouraged to alert staff for any changes.    "

## 2024-09-10 ENCOUNTER — PHARMACY VISIT (OUTPATIENT)
Dept: PHARMACY | Facility: MEDICAL CENTER | Age: 34
End: 2024-09-10
Payer: COMMERCIAL

## 2024-09-10 NOTE — ED NOTES
PT given discharge paper work, all questions answered and pt verbalized understanding. PIV removed, and VSS. PT wheeled out to lobby.  Julieta called MTM for pt.

## 2024-09-10 NOTE — ED NOTES
Patient provided with refreshed ice pack per request, no other needs at this time, understands POC.

## 2024-09-10 NOTE — DISCHARGE PLANNING
"Medical Social Work    MSW spoke with EMT who states that pt reports she does not have her wheelchair and cannot get home via MTM.  MSW met with pt at bedside.  Pt states that she came in via [facility] though chart states that pt was a walk-in.  Pt states that [facility] and her didn't think to bring her wheelchair.  Pt states that she doesn't have anyone at home to bring her wheelchair out to a cab as her aunt is \"dead asleep\".  Pt was informed that [employer] would cost about $150 to get her home and it would be out of pocket.  Pt contacted her grandma via phone and they agreed that grandma would wake up pt's aunt to get pt's wheelchair to the cab.  Pt has used MTM before and is agreeable to the plan.  EMT will notify SW when pt's ready to D/C and MTM will be arranged.  Staff will assist pt into cab and family will assist pt into home.  Pt verified home address of: [facility] [place].  "

## 2024-09-10 NOTE — ED NOTES
Report received from ED tech Talisha and RN Karime. PT attached to monitor, call light within reach. Redressed IV dressing. NAD, and no other needs at this time.

## 2024-09-10 NOTE — ED NOTES
PT able to self transfer to and from wheelchair. ERP at bedside talking to PT about means to get home.

## 2024-09-10 NOTE — ED PROVIDER NOTES
"ED Provider Note    CHIEF COMPLAINT  Chief Complaint   Patient presents with    GLF     Pt states \"I fell three times.\"    T-5000 Head Injury     Secondary to fall  - LOC  - anticoagulants/platelets    Hip Injury     Pt fell on her right side and injured her right hip       EXTERNAL RECORDS REVIEWED  External ED Note ED visit yesterday for rule out DVT of upper extremity.    HPI/ROS  LIMITATION TO HISTORY   Select: : None  OUTSIDE HISTORIAN(S):  none    Kristin Balderrama is a 33 y.o. female who presents with right hip pain after a fall.  Patient reports 3 falls today due to generalized weakness.  She has a wheelchair and walker at baseline but has not been using it.  She notes she usually \"grabs onto walls\" when she walks.  She has been feeling more weak overall recently and states that is due to recovering from recent abscess drainage in the axilla.  She has no fevers, she is eating and drinking normally.  She reports that pain in her right hip is so unbearable she is unable to move the leg at all.  Did also report hitting her head however did not lose consciousness, has no visual changes or vomiting.  No bruising or hematomas reported to the scalp by patient.  She is not on blood thinners or aspirin.    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    SURGICAL HISTORY   has a past surgical history that includes neuro dest facet l/s w/ig sngl (04/21/2015); recovery (01/27/2016); [place] by laparoscopy (08/29/2010); lumbar fusion anterior (08/21/2012); other cardiac surgery (01/2016); tonsillectomy; bowel stimulator insertion (07/13/2016); gastroscopy with balloon dilatation (N/A, 01/04/2017); muscle biopsy (Right, 01/26/2017); other abdominal surgery; laminotomy; bowel stimulator insertion (03/10/2021); lap,diagnostic abdomen (02/14/2022); ovarian cystectomy (Right, 02/14/2022); percut fix prox/neck femur fx (Left, 01/28/2023); inguinal hernia laparoscopic (Right, 07/21/2023); hernia repair (Right, 07/21/2023); cystoscopy,insert ureteral stent (Right, 2/12/2024); cysto/uretero/pyeloscopy, dx (Right, 2/12/2024); and lasertripsy (Right, 2/12/2024).    FAMILY HISTORY  Family History   Problem Relation Age of Onset    Hypertension Mother     Sleep Apnea Mother     Heart Disease Mother     Other Mother         hypothryod    Hypertension Maternal Uncle     Heart Disease Maternal Grandmother     Hypertension Maternal Grandmother     No Known Problems Sister     Other Sister         Narcolepsy;fibromyalsia;bone;nerve    Genitourinary () Problems Sister         endometriosis       SOCIAL HISTORY  Social History     Tobacco Use    Smoking status: Never    Smokeless tobacco: Never   Vaping Use    Vaping status: Never Used   Substance and Sexual Activity    Alcohol use: No     Alcohol/week: 0.0 oz    Drug use: Never     Frequency: 7.0 times per week    Sexual activity: Not Currently     Birth control/protection: Implant       CURRENT MEDICATIONS  Home Medications    **Home medications have not yet been reviewed for this encounter**         ALLERGIES  Allergies  "  Allergen Reactions    Cefdinir Shortness of Breath and Itching     Tolerated 1/18/17  Tolerates ceftriaxone  Tolerated augmentin 8/2019     Depakote [Divalproex Sodium] Unspecified     Muscle spasms/muscle pain and weakness      Depakote [Divalproex Sodium]      Muscle spasms/muscle pain and weakness    Doxycycline Anaphylaxis and Vomiting     Other reaction(s): pustules/blisters  Other reaction(s): stomach pain    Montelukast      Cardiac effusion    Montelukast [Singulair] Unspecified     Cardiac effusion    Vancomycin Itching     Pt becomes flushed in face and chest.   RXN=7/10/16    Wound Dressing Adhesive Rash     By pt report-\"removes skin totally off\"    Amitriptyline Unspecified     Headaches      Amoxicillin Rash          Aripiprazole Unspecified     Headaches/muscle twitching      Aripiprazole [Abilify] Unspecified     Headaches/muscle twitching      Clindamycin Nausea         Other reaction(s): nausea stomach pain    Erythromycin Rash     .  Other reaction(s): nausea stomach pain    Flomax [Tamsulosin Hydrochloride] Swelling    Hydromorphone      Other reaction(s): vomiting    Levaquin Unspecified     Severe muscle cramps in legs  RXN=unknown  Other reaction(s): leg muscle cramps    Metformin Unspecified     Increased lactic acid     Other reaction(s): itching and rash/nausea vomiting    Sulfa Drugs Hives, Itching, Myalgia and Unspecified     Muscle pain and weakness    Other reaction(s): unknown    Tamsulosin Swelling     Swelling of legs    Tape Rash     Tears skin off  coban with Tegaderm tape ok intermittently  RXN=ongoing    Sulfamethoxazole W-Trimethoprim Rash    Ampicillin Rash     Pt reports that she received a rash     Ciprofloxacin Rash          Keflex Rash     Pt states she gets a rash with this medication  Tolerates ceftriaxone  Can take with Benadryl    Levofloxacin Unspecified     Leg muscle cramps    Metronidazole Rash     \"Vision problems\"  Other reaction(s): vision problems    " "Penicillins Hives     Can take with Benadryl    Valproic Acid Rash     .       PHYSICAL EXAM  VITAL SIGNS: /67   Pulse 74   Temp 36.9 °C (98.5 °F)   Resp 18   Ht 1.626 m (5' 4\")   Wt 120 kg (264 lb 8.8 oz)   SpO2 96%   BMI 45.41 kg/m²    Pulse oximetry interpretation: I interpret the pulse oximetry as normal.  Constitutional: Awake and alert. No acute distress.  Head: NCAT. No cephalohematoma. No signs of trauma.  HEENT: Normal Conjunctiva. PERRLA.  Neck: Grossly normal range of motion. Airway midline.  Cardiovascular: Normal heart rate, Normal rhythm.  Thorax & Lungs: No respiratory distress. Clear to Auscultation bilaterally.  Abdomen: Normal inspection. Nontender. Nondistended  Skin: No obvious rash.  Back: No midline tenderness, No CVA tenderness.   Musculoskeletal: No obvious deformity. Moves all extremities Well although poor effort reported due to pain.  No ecchymosis or bruising.  Neurologic: A&Ox3. Sensation intact to b/l lower extremities.  Psychiatric: Mood and affect are appropriate for situation.    RADIOLOGY/PROCEDURES   I have independently interpreted the diagnostic imaging associated with this visit and am waiting the final reading from the radiologist.   My preliminary interpretation is as follows: no fracture    Radiologist interpretation:  DX-HIP-UNILATERAL-WITH PELVIS-1 VIEW RIGHT   Final Result      No acute posttraumatic imaging findings involving the right hip.          COURSE & MEDICAL DECISION MAKING    ASSESSMENT, COURSE AND PLAN  Care Narrative:   33-year-old female reports history of Erler's Danlos syndrome here with right hip pain after 3 falls today.  Afebrile normal vitals  On exam she has no bruising, no obvious deformity.  She can range the hip well, flex and extend at the knee although reports pain.  She has no signs of head trauma and she is GCS 15  X-ray of the hip unremarkable  She is medicated for pain.  She is assessed for ability to transfer to her wheelchair.  " She is reluctant to attempt a walking.  She is able to transfer successfully.  She was advised of reassuring x-ray and no acute findings needing a dressing.  Should be discharged to prior living conditions and advised she can use her wheelchair in the short-term.    In prescribing controlled substances to this patient, I certify that I have obtained and reviewed the medical history of Kristin Balderrama. I have also made a good bhavna effort to obtain applicable records from other providers who have treated the patient and records did not demonstrate any increased risk of substance abuse that would prevent me from prescribing controlled substances.     I have conducted a physical exam and documented it. I have reviewed Ms. Balderrama’s prescription history as maintained by the [place] Prescription Monitoring Program.     I have assessed the patient’s risk for abuse, dependency, and addiction using the validated Opioid Risk Tool available at https://www.mdcalc.com/dyojpl-klkd-ojyw-ort-narcotic-abuse.     Given the above, I believe the benefits of controlled substance therapy outweigh the risks. The reasons for prescribing controlled substances include non-narcotic, oral analgesic alternatives have been inadequate for pain control. Accordingly, I have discussed the risk and benefits, treatment plan, and alternative therapies with the patient.       ADDITIONAL PROBLEMS MANAGED  none    DISPOSITION AND DISCUSSIONS  I have discussed management of the patient with the following physicians and ARIES's:  none    Discussion of management with other HP or appropriate source(s): None     Escalation of care considered, and ultimately not performed:acute inpatient care management, however at this time, the patient is most appropriate for outpatient management    Barriers to care at this time, including but not limited to:  none .     Decision tools and prescription drugs considered including, but not limited to:  none .    FINAL  DIAGNOSIS  1. Right hip pain    2. Fall, initial encounter         Electronically signed by: Sebas Galicia D.O., 9/9/2024 5:01 PM

## 2024-09-10 NOTE — DISCHARGE PLANNING
Medical Social Work    MSW was notified by EMT that pt is ready for [employer] ride.  MSW contacted Daron with [employer] to arrange for cab ride for pt.  ETA of 15-20 minutes.  ER Lobby notified.

## 2024-09-10 NOTE — ED NOTES
Bedside report to Fernie HASSAN. Pt on HR, Oxygen, BP, and cardiac monitor. Pt bed locked in lowest position side rails up x 2 call light within reach. Pt GCS 15.

## 2024-09-13 ASSESSMENT — ENCOUNTER SYMPTOMS
NUMBNESS: 0
SHORTNESS OF BREATH: 1
TINGLING: 1
MUSCLE WEAKNESS: 0
SPUTUM PRODUCTION: 0

## 2024-09-18 ENCOUNTER — HOSPITAL ENCOUNTER (OUTPATIENT)
Dept: RADIOLOGY | Facility: MEDICAL CENTER | Age: 34
End: 2024-09-18
Attending: ORTHOPAEDIC SURGERY
Payer: MEDICARE

## 2024-09-18 DIAGNOSIS — M25.551 RIGHT HIP PAIN: ICD-10-CM

## 2024-09-18 PROCEDURE — 73700 CT LOWER EXTREMITY W/O DYE: CPT | Mod: RT

## 2024-09-19 ENCOUNTER — OFFICE VISIT (OUTPATIENT)
Dept: CARDIOLOGY | Facility: MEDICAL CENTER | Age: 34
End: 2024-09-19
Attending: INTERNAL MEDICINE
Payer: MEDICARE

## 2024-09-19 VITALS
HEART RATE: 87 BPM | OXYGEN SATURATION: 95 % | RESPIRATION RATE: 16 BRPM | DIASTOLIC BLOOD PRESSURE: 72 MMHG | BODY MASS INDEX: 44.63 KG/M2 | SYSTOLIC BLOOD PRESSURE: 117 MMHG | HEIGHT: 64 IN

## 2024-09-19 DIAGNOSIS — G90.A POSTURAL ORTHOSTATIC TACHYCARDIA SYNDROME: ICD-10-CM

## 2024-09-19 DIAGNOSIS — R00.2 PALPITATIONS: ICD-10-CM

## 2024-09-19 DIAGNOSIS — I47.11 INAPPROPRIATE SINUS TACHYCARDIA (HCC): ICD-10-CM

## 2024-09-19 PROCEDURE — 99214 OFFICE O/P EST MOD 30 MIN: CPT | Performed by: INTERNAL MEDICINE

## 2024-09-19 PROCEDURE — 3078F DIAST BP <80 MM HG: CPT | Performed by: INTERNAL MEDICINE

## 2024-09-19 PROCEDURE — 3074F SYST BP LT 130 MM HG: CPT | Performed by: INTERNAL MEDICINE

## 2024-09-19 PROCEDURE — 99213 OFFICE O/P EST LOW 20 MIN: CPT | Mod: 27 | Performed by: INTERNAL MEDICINE

## 2024-09-19 ASSESSMENT — ENCOUNTER SYMPTOMS
DIZZINESS: 0
LOSS OF CONSCIOUSNESS: 0
PALPITATIONS: 0
SHORTNESS OF BREATH: 0
COUGH: 0
MYALGIAS: 0

## 2024-09-19 NOTE — PROGRESS NOTES
Chief Complaint   Patient presents with    Atrial Fibrillation    Supraventricular Tachycardia (SVT)       Subjective     Kristin Balderrama is a 33 y.o. female who presents today for cardiac care.    The patient had sinus tachycardia, ablation 2016 for sinus node modification for IST (Inappropriate Sinus Tachycardia), IST ablation 2016, POTS on chronic Corlanor and beta-blocker therapy, hypermobility syndrome, TONYA, IIH (idiopathic intracranial hypertension), NPH normal pressure hydrocephalus, optic neuropathy, chronic pain syndrome, bladder incontinence, S/P sacral stimulator, cervical neuralgia with leg weakness, nephrolithiasis S/P kidney stone extraction stent placement 2/14/2024, hip fracture 1/27/2023 H/O psychiatric issues.    Last seen 3/4/2024.  Since last appointment the patient had a significant right axillary infection with Proteus requiring several antibiotics finally treated but has some residual left arm pain.  Also injured her right hip requiring use of wheelchair.  Being evaluated by LATIA Huston MD and has an upcoming MRI pending.  Has had some palpitations with increase in metoprolol currently heart rate not well-controlled    Since 8/3/2023 appointment the patient the patient has done well from a strictly cardiac standpoint.  However she has had [facility] ER visits or hospitalized several times this year including 1/16/2024 for cervical neuralgia ultimately managed with chiropractic manipulation (Jonathan Wise DC, personal friend of patient), 2/14/2024 kidney stone extraction, stent placement, 2/18/2024 for pyelonephritis, 3/14/2024 for intractable nausea, vomiting due to gastroparesis.  Gastroparesis being managed by Reinier Preston MD, surgeon [facility].  Had a trial of Botox which was successful and has a follow-up appointment to consider surgical intervention.  Has follow-up with GI consultants for possible colonic bleeding.  For EDS utilizing a Body Braid developed by Laron Patel MD,  "[place] position with home the patient's older daughter who has severe EDS is employed.  No cardiac issues has had no cardiac symptoms including chest pain, palpitations, shortness of breath.    Since 11/10/2022 appointment the patient she was seen by Reinier Leonardo MD.  She has had chronic problems with an inguinal and ventral hernia.  She recently underwent successful laparoscopic inguinal hernia repair.  Postop she states that with the narcotic therapy her POTS acted up and she had a syncopal episode but is recovering with anticipated endoscopic ventral hernia repair in the future.  She has had chronic chest pain symptoms with ER visits.  She contacted the office and started on 7/3/2023 we tried empiric therapy switching metoprolol to diltiazem however the patient does not feel that its helping and would like to go back to metoprolol.  She also feels switching to diltiazem as allowed for additional headaches.      Additionally she states that genetic testing has returned with a profile suggesting hypermobility syndrome but no specific genetic defect to confirm EDS (Erler's Danlos syndrome)    Past medical history  The patient has had chronic symptoms concerning for EDS (Erler's Danlos syndrome) recently confirmed in her sister by genetic testing in [place], the patient requires some more detailed genetic testing however she cannot afford it nonetheless she has had chronic problems with polyarticular joint pain, hypermobility, easy bruisability, \"my tissues tear\", problems with \"weakened esophagus, brain fog, weak larynx\", currently with a right ankle problem that is \"going out\" requiring the use of a cane for stabilization.      Past Medical History:   Diagnosis Date    Abdominal pain     Anginal syndrome     Random chest pain especially with tachycardia    Apnea, sleep     Arthritis     ASTHMA     when around smoke    Back pain     Borderline personality disorder (HCC)     Chickenpox     Chronic UTI 09/18/2010    " "Daytime sleepiness     Dental disorder 03/08/2021    Infected tooth    Depression     Diabetes (HCC)     Diarrhea     Incontinece    Disorder of thyroid     Hashimoto's    Fatigue     Frequent headaches     Heart burn     History of falling     Inappropriate sinus tachycardia (HCC) 2016    Statusp post ablation by Dr. Kumar.    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    Urinary bladder disorder     Suprapubic cath. 2/8/22: Not anymore.     Urinary incontinence     Weakness     Wears glasses      Past Surgical History:   Procedure Laterality Date    HI CYSTOSCOPY,INSERT URETERAL STENT Right 2/12/2024    Procedure: CYSTOSCOPY, WITH RIGHT URETEROSCOPY, WITH LITHOTRIPSY, WITH INSERTION OF RIGHT URETERAL STENT;  Surgeon: Josafat Roberson M.D.;  Location: [facility];  Service: Urology    HI CYSTO/URETERO/PYELOSCOPY, DX Right 2/12/2024    Procedure: URETEROSCOPY;  Surgeon: Josafat Roberson M.D.;  Location: [facility];  Service: Urology    LASERTRIPSY Right 2/12/2024    Procedure: LITHOTRIPSY, USING LASER;  Surgeon: Josafat Roberson M.D.;  Location: [facility];  Service: Urology    INGUINAL HERNIA LAPAROSCOPIC Right 07/21/2023    Procedure: LAPAROSCOPIC RIGHT INGUINAL HERNIA REPAIR WITH MESH;  Surgeon: Joe Noyola M.D.;  Location: [facility];  Service: General    HERNIA REPAIR Right 07/21/2023    PB " PERCUT FIX PBOX/NECK FEMUR FX Left 01/28/2023    Procedure: FIXATION, HIP, USING CANNULATED SCREW;  Surgeon: Noman Abdul M.D.;  Location: SURGERY [facility];  Service: Orthopedics    IA LAP,DIAGNOSTIC ABDOMEN  02/14/2022    Procedure: LAPAROSCOPY;  Surgeon: Seamus Pisano M.D.;  Location: SURGERY SAME DAY [facility];  Service: Gynecology    OVARIAN CYSTECTOMY Right 02/14/2022    Procedure: EXCISION, CYST, OVARY;  Surgeon: Seamus Pisano M.D.;  Location: SURGERY SAME DAY [facility];  Service: Gynecology    BOWEL STIMULATOR INSERTION  03/10/2021    Procedure: INSERTION, ELECTRODE LEADS AND PULSE GENERATOR, NEUROSTIMULATOR, SACRAL - REMOVAL OF INTERSTIM WITH REPLACEMENT OF SACRAL NEUROMODULATION DEVICE;  Surgeon: Joe Noyola M.D.;  Location: [facility];  Service: General    MUSCLE BIOPSY Right 01/26/2017    Procedure: MUSCLE BIOPSY - THIGH;  Surgeon: Isidro Vigil M.D.;  Location: [facility];  Service:     GASTROSCOPY WITH BALLOON DILATATION N/A 01/04/2017    Procedure: GASTROSCOPY WITH DILATATION;  Surgeon: Torres Vargas M.D.;  Location: [facility];  Service:     BOWEL STIMULATOR INSERTION  07/13/2016    Procedure: BOWEL STIMULATOR INSERTION FOR PERMANENT INTERSTIM SACRAL IMPLANT;  Surgeon: Joe Noyola M.D.;  Location: [facility];  Service:     RECOVERY  01/27/2016    Procedure: CATH LAB EP STUDY WITH SINUS NODE MODIFICATION LA;  Surgeon: Recoveryonly Surgery;  Location: SURGERY PRE-POST PROC UNIT [facility];  Service:     OTHER CARDIAC SURGERY  01/2016    cardiac ablation    NEURO DEST FACET L/S W/IG SNGL  04/21/2015    Performed by Reza Tabor at [facility]    LUMBAR FUSION ANTERIOR  08/21/2012    Performed by SUSIE SAWANT at [facility]    ALYSSA BY LAPAROSCOPY  08/29/2010    Performed by SHAYY JOHNS at [facility]    LAMINOTOMY      OTHER ABDOMINAL SURGERY      TONSILLECTOMY      tonsillectomy     Family  History   Problem Relation Age of Onset    Hypertension Mother     Sleep Apnea Mother     Heart Disease Mother     Other Mother         hypothryod    Hypertension Maternal Uncle     Heart Disease Maternal Grandmother     Hypertension Maternal Grandmother     No Known Problems Sister     Other Sister         Narcolepsy;fibromyalsia;bone;nerve    Genitourinary () Problems Sister         endometriosis     Social History     Socioeconomic History    Marital status: Single     Spouse name: Not on file    Number of children: Not on file    Years of education: Not on file    Highest education level: Not on file   Occupational History    Not on file   Tobacco Use    Smoking status: Never    Smokeless tobacco: Never   Vaping Use    Vaping status: Never Used   Substance and Sexual Activity    Alcohol use: No     Alcohol/week: 0.0 oz    Drug use: Never     Frequency: 7.0 times per week    Sexual activity: Not Currently     Birth control/protection: Implant   Other Topics Concern    Not on file   Social History Narrative    ** Merged History Encounter **          Social Determinants of Health     Financial Resource Strain: Medium Risk (4/30/2021)    Overall Financial Resource Strain (CARDIA)     Difficulty of Paying Living Expenses: Somewhat hard   Food Insecurity: No Food Insecurity (8/11/2024)    Hunger Vital Sign     Worried About Running Out of Food in the Last Year: Never true     Ran Out of Food in the Last Year: Never true   Transportation Needs: No Transportation Needs (8/11/2024)    PRAPARE - Transportation     Lack of Transportation (Medical): No     Lack of Transportation (Non-Medical): No   Physical Activity: Not on file   Stress: Not on file   Social Connections: Not on file   Intimate Partner Violence: Not At Risk (8/11/2024)    Humiliation, Afraid, Rape, and Kick questionnaire     Fear of Current or Ex-Partner: No     Emotionally Abused: No     Physically Abused: No     Sexually Abused: No   Housing Stability:  "Low Risk  (8/11/2024)    Housing Stability Vital Sign     Unable to Pay for Housing in the Last Year: No     Number of Places Lived in the Last Year: 1     Unstable Housing in the Last Year: No     Allergies   Allergen Reactions    Cefdinir Shortness of Breath and Itching     Tolerated 1/18/17  Tolerates ceftriaxone  Tolerated augmentin 8/2019     Depakote [Divalproex Sodium] Unspecified     Muscle spasms/muscle pain and weakness      Depakote [Divalproex Sodium]      Muscle spasms/muscle pain and weakness    Doxycycline Anaphylaxis and Vomiting     Other reaction(s): pustules/blisters  Other reaction(s): stomach pain    Montelukast      Cardiac effusion    Montelukast [Singulair] Unspecified     Cardiac effusion    Vancomycin Itching     Pt becomes flushed in face and chest.   RXN=7/10/16    Wound Dressing Adhesive Rash     By pt report-\"removes skin totally off\"    Amitriptyline Unspecified     Headaches      Amoxicillin Rash          Aripiprazole Unspecified     Headaches/muscle twitching      Aripiprazole [Abilify] Unspecified     Headaches/muscle twitching      Clindamycin Nausea         Other reaction(s): nausea stomach pain    Erythromycin Rash     .  Other reaction(s): nausea stomach pain    Flomax [Tamsulosin Hydrochloride] Swelling    Hydromorphone      Other reaction(s): vomiting    Levaquin Unspecified     Severe muscle cramps in legs  RXN=unknown  Other reaction(s): leg muscle cramps    Metformin Unspecified     Increased lactic acid     Other reaction(s): itching and rash/nausea vomiting    Sulfa Drugs Hives, Itching, Myalgia and Unspecified     Muscle pain and weakness    Other reaction(s): unknown    Tamsulosin Swelling     Swelling of legs    Tape Rash     Tears skin off  coban with Tegaderm tape ok intermittently  RXN=ongoing    Sulfamethoxazole W-Trimethoprim Rash    Ampicillin Rash     Pt reports that she received a rash     Ciprofloxacin Rash          Keflex Rash     Pt states she gets a rash " "with this medication  Tolerates ceftriaxone  Can take with Benadryl    Levofloxacin Unspecified     Leg muscle cramps    Metronidazole Rash     \"Vision problems\"  Other reaction(s): vision problems    Penicillins Hives     Can take with Benadryl    Valproic Acid Rash     .     Outpatient Encounter Medications as of 9/19/2024   Medication Sig Dispense Refill    metoprolol SR (TOPROL XL) 25 MG TABLET SR 24 HR Take 1 Tablet by mouth 2 times a day. 200 Tablet 3    Galcanezumab-gnlm (EMGALITY) 120 MG/ML Solution Auto-injector Inject 1 mL subcutaneously every month. 1 mL 11    lamoTRIgine (LAMICTAL) 25 MG Tab Take 2 Tablets by mouth 2 times a day. 360 Tablet 1    Rimegepant Sulfate (NURTEC) 75 MG TABLET DISPERSIBLE Take 1 tablet by mouth at onset of migraine or aura okay to repeat in 24 hours if needed. 8 Tablet 5    ondansetron (ZOFRAN ODT) 4 MG TABLET DISPERSIBLE Take 1-2 tablets by mouth at onset of nausea or vomiting okay to repeat in 12 hours if needed 20 Tablet 2    sumatriptan (IMITREX) 20 MG/ACT nasal spray Give 1 dose at onset headache okay to repeat in 2 hours if needed for max of 2 doses in a 24 hour timeframe. 6 Each 5    ivabradine (CORLANOR) 7.5 MG Tab tablet Take 1 Tablet by mouth 2 times a day with meals. 60 Tablet 3    diphenhydrAMINE (BENADRYL) 25 MG Tab Take 25-50 mg by mouth 2 times a day. Scheduled    25 mg = AM  50 MG = PM      Melatonin 10 MG Tab Take 10 mg by mouth at bedtime.      sumatriptan (IMITREX) 5 MG/ACT nasal spray Administer 1 Spray into affected nostril(S) as needed. Indications: Migraine Headache      topiramate (TOPAMAX) 50 MG tablet Take 50 mg by mouth 2 times a day.      spironolactone (ALDACTONE) 25 MG Tab 25 mg every day.      ibuprofen (MOTRIN) 800 MG Tab Take 1 Tablet by mouth every 8 hours as needed for Mild Pain. 30 Tablet 0    loperamide (IMODIUM) 2 MG Cap Take 1 Capsule by mouth 4 times a day as needed for Diarrhea. 30 Capsule 0    adalimumab (HUMIRA) 80 MG/0.8ML injection " "Inject 80 mg under the skin every 14 days. Last injection was on 3/14/2024      amLODIPine (NORVASC) 5 MG Tab Take 5 mg by mouth every day.      Rimegepant Sulfate (NURTEC) 75 MG TABLET DISPERSIBLE Take 75 mg by mouth 1 time a day as needed (migraine).      gabapentin (NEURONTIN) 400 MG Cap Take 1,200 mg by mouth 3 times a day. 3 capsules=1200mg      omeprazole (PRILOSEC) 20 MG delayed-release capsule Take 20 mg by mouth 2 times a day.      lamoTRIgine (LAMICTAL) 25 MG Tab Take 50 mg by mouth 2 times a day. 25 mg x 2 tablets = 50 mg      Galcanezumab-gnlm (EMGALITY) 120 MG/ML Solution Auto-injector Inject 120 mg under the skin every 30 (thirty) days. On the 27th of every month, last dose was taken on 2/27/2024      ziprasidone (GEODON) 40 MG Cap Take 1 capsule by mouth at bedtime. 30 Capsule 2    etonogestrel (NEXPLANON) 68 MG Implant implant Inject 68 mg under the skin see administration instructions. Implant in left arm  Every 3 years to change, thinks it was placed 2021       No facility-administered encounter medications on file as of 9/19/2024.     Review of Systems   Respiratory:  Negative for cough and shortness of breath.    Cardiovascular:  Negative for chest pain and palpitations.   Musculoskeletal:  Negative for myalgias.   Neurological:  Negative for dizziness and loss of consciousness.              Objective     /72 (BP Location: Left arm, Patient Position: Sitting, BP Cuff Size: Adult)   Pulse 87   Resp 16   Ht 1.626 m (5' 4\")   SpO2 95%   BMI 44.63 kg/m²     Physical Exam  Constitutional:       Comments: In a wheelchair     Eyes:      Conjunctiva/sclera: Conjunctivae normal.      Pupils: Pupils are equal, round, and reactive to light.   Neck:      Vascular: No JVD.   Cardiovascular:      Rate and Rhythm: Normal rate and regular rhythm.      Pulses: Normal pulses.      Heart sounds: Normal heart sounds.   Pulmonary:      Effort: Pulmonary effort is normal. No accessory muscle usage or " respiratory distress.      Breath sounds: Normal breath sounds. No wheezing or rales.   Musculoskeletal:      Right lower leg: No edema.      Left lower leg: No edema.   Skin:     General: Skin is warm and dry.      Findings: No rash.      Nails: There is no clubbing.   Neurological:      Mental Status: She is alert and oriented to person, place, and time.   Psychiatric:         Behavior: Behavior normal.               MPI 4/6/2017  Baseline ECG:NSR, diffuse T wave flattening throughout  Stress ECG: No ischemic T wave changes with peak stress  Impression: Normal stress ECG, nuclear images pending   No myocardial infarct or inducible ischemia.   Normal LEFT ventricular function.    ECHOCARDIOGRAM 3/1/2017  Left ventricular ejection fraction 60%.  No valvular disease.    ECHOCARDIOGRAM 12/26/2022  Normal left ventricular systolic function.  The left ventricular ejection fraction is visually estimated to be 55%.  Normal right ventricular size and systolic function.  Right heart pressures are normal.      CHEST CTA 2/17/2022  1.  No evidence of pulmonary embolism.  2.  Trace bilateral pleural effusions.  3.  Clear lungs without consolidation.  4.  Ascending aorta normal caliber no dissection.    Cardiac event recorder 8/31/2020  ZioPatch Summary:   1. Sinus rhythm with appropriate heart rate range. Average 73, range 52 to 117 bpm.   2. Normal sinus node and AV node conduction normal. No pauses.   3. Rare PACs.   4. Rare PVCs.   5. No sustained rhythm changes. No atrial fibrillation/flutter.   6. 5 patient triggers, symptoms reported include fluttering and racing, shaky during sinus, 73 to 90 bpm..   Conclusion: Normal monitor.  Sinus rhythm with rare PVCs and PACs.  Symptoms during sinus rhythm.   Lexy Solomon MD [facility]        Assessment & Plan     1. Palpitations        2. Postural orthostatic tachycardia syndrome        3. Inappropriate sinus tachycardia (HCC)              Medical Decision Making: Today's  Assessment/Status/Plan:  Assessment  Inappropriate sinus tachycardia.  Ablation for IST, 2016.  Thyroid disorder, on chronic supplementation.  Sleep apnea on CPAP.  POTS  Morbid obesity.  Hypermobility syndrome, genetic testing not specific for EDS  Chest pain recurrent, noncardiac  Migraine headaches  Multiple other problems see above    Recommendation Discussion  Currently clinically stable from a cardiac standpoint regarding IST, POTS.  Continue on metoprolol, Corlanor, salt tablets.  Blood pressure currently stable  RTC 6 months.

## 2024-09-23 ENCOUNTER — OFFICE VISIT (OUTPATIENT)
Dept: MEDICAL GROUP | Facility: MEDICAL CENTER | Age: 34
End: 2024-09-23
Payer: MEDICARE

## 2024-09-23 VITALS
TEMPERATURE: 98 F | BODY MASS INDEX: 46.11 KG/M2 | DIASTOLIC BLOOD PRESSURE: 72 MMHG | RESPIRATION RATE: 17 BRPM | HEIGHT: 64 IN | SYSTOLIC BLOOD PRESSURE: 118 MMHG | WEIGHT: 270.06 LBS | OXYGEN SATURATION: 96 % | HEART RATE: 73 BPM

## 2024-09-23 DIAGNOSIS — M79.89 BILATERAL HAND SWELLING: ICD-10-CM

## 2024-09-23 DIAGNOSIS — N39.46 MIXED STRESS AND URGE URINARY INCONTINENCE: ICD-10-CM

## 2024-09-23 DIAGNOSIS — J98.4 CHRONIC RESTRICTIVE LUNG DISEASE: ICD-10-CM

## 2024-09-23 DIAGNOSIS — L73.2 HIDRADENITIS AXILLARIS: ICD-10-CM

## 2024-09-23 DIAGNOSIS — I10 PRIMARY HYPERTENSION: ICD-10-CM

## 2024-09-23 DIAGNOSIS — M25.50 ARTHRALGIA, UNSPECIFIED JOINT: ICD-10-CM

## 2024-09-23 DIAGNOSIS — E11.69 TYPE 2 DIABETES MELLITUS WITH OTHER SPECIFIED COMPLICATION, WITHOUT LONG-TERM CURRENT USE OF INSULIN (HCC): ICD-10-CM

## 2024-09-23 DIAGNOSIS — E66.01 MORBID OBESITY WITH BMI OF 40.0-44.9, ADULT (HCC): ICD-10-CM

## 2024-09-23 DIAGNOSIS — F25.1 SCHIZOAFFECTIVE DISORDER, DEPRESSIVE TYPE (HCC): ICD-10-CM

## 2024-09-23 DIAGNOSIS — K21.9 GASTROESOPHAGEAL REFLUX DISEASE WITHOUT ESOPHAGITIS: Chronic | ICD-10-CM

## 2024-09-23 PROBLEM — F48.9 PSYCHOGENIC DISORDER: Status: RESOLVED | Noted: 2021-06-24 | Resolved: 2024-09-23

## 2024-09-23 PROBLEM — K58.9 IRRITABLE BOWEL SYNDROME: Status: RESOLVED | Noted: 2022-07-27 | Resolved: 2024-09-23

## 2024-09-23 PROBLEM — H04.123 DRY EYES: Status: RESOLVED | Noted: 2020-08-18 | Resolved: 2024-09-23

## 2024-09-23 PROBLEM — M54.42 ACUTE BILATERAL LOW BACK PAIN WITH BILATERAL SCIATICA: Status: RESOLVED | Noted: 2021-06-16 | Resolved: 2024-09-23

## 2024-09-23 PROBLEM — Z87.81 S/P LEFT HIP FRACTURE: Status: RESOLVED | Noted: 2023-01-31 | Resolved: 2024-09-23

## 2024-09-23 PROBLEM — R74.8 ELEVATED LIVER ENZYMES: Status: RESOLVED | Noted: 2024-08-11 | Resolved: 2024-09-23

## 2024-09-23 PROBLEM — Z87.442 HISTORY OF NEPHROLITHIASIS: Status: ACTIVE | Noted: 2022-10-31

## 2024-09-23 PROBLEM — H46.9 BILATERAL OPTIC NEURITIS: Status: RESOLVED | Noted: 2023-11-14 | Resolved: 2024-09-23

## 2024-09-23 PROBLEM — Q79.60 EHLERS-DANLOS SYNDROME: Chronic | Status: ACTIVE | Noted: 2022-11-13

## 2024-09-23 PROBLEM — F43.10 POSTTRAUMATIC STRESS DISORDER: Status: RESOLVED | Noted: 2020-03-02 | Resolved: 2024-09-23

## 2024-09-23 PROBLEM — R29.898 MUSCULAR DECONDITIONING: Status: RESOLVED | Noted: 2018-07-14 | Resolved: 2024-09-23

## 2024-09-23 PROBLEM — J44.9 CHRONIC OBSTRUCTIVE LUNG DISEASE (HCC): Chronic | Status: ACTIVE | Noted: 2020-04-20

## 2024-09-23 PROBLEM — R62.7 FAILURE TO THRIVE IN ADULT: Status: RESOLVED | Noted: 2021-06-21 | Resolved: 2024-09-23

## 2024-09-23 PROBLEM — S72.002A HIP FRACTURE, LEFT, CLOSED, INITIAL ENCOUNTER (HCC): Status: RESOLVED | Noted: 2023-01-27 | Resolved: 2024-09-23

## 2024-09-23 PROBLEM — G47.00 INSOMNIA: Status: RESOLVED | Noted: 2022-10-31 | Resolved: 2024-09-23

## 2024-09-23 PROBLEM — F44.81 MULTIPLE PERSONALITIES (HCC): Status: RESOLVED | Noted: 2023-11-14 | Resolved: 2024-09-23

## 2024-09-23 PROBLEM — G43.409 HEMIPLEGIC MIGRAINE WITHOUT STATUS MIGRAINOSUS, NOT INTRACTABLE: Status: RESOLVED | Noted: 2022-07-27 | Resolved: 2024-09-23

## 2024-09-23 PROBLEM — K64.9 HEMORRHOID: Status: ACTIVE | Noted: 2021-05-24

## 2024-09-23 PROBLEM — M54.41 ACUTE BILATERAL LOW BACK PAIN WITH BILATERAL SCIATICA: Status: RESOLVED | Noted: 2021-06-16 | Resolved: 2024-09-23

## 2024-09-23 PROBLEM — R01.1 HEART MURMUR: Status: RESOLVED | Noted: 2022-10-10 | Resolved: 2024-09-23

## 2024-09-23 PROBLEM — G43.909 MIGRAINE: Status: RESOLVED | Noted: 2022-10-10 | Resolved: 2024-09-23

## 2024-09-23 PROBLEM — T83.010A SUPRAPUBIC CATHETER DYSFUNCTION (HCC): Status: RESOLVED | Noted: 2021-02-05 | Resolved: 2024-09-23

## 2024-09-23 PROBLEM — E11.9 DIABETES (HCC): Status: RESOLVED | Noted: 2023-11-14 | Resolved: 2024-09-23

## 2024-09-23 PROBLEM — E06.3 HASHIMOTO'S DISEASE: Status: RESOLVED | Noted: 2023-11-14 | Resolved: 2024-09-23

## 2024-09-23 PROBLEM — R00.2 PALPITATIONS: Status: RESOLVED | Noted: 2022-05-13 | Resolved: 2024-09-23

## 2024-09-23 PROBLEM — I48.0 PAROXYSMAL ATRIAL FIBRILLATION (HCC): Status: ACTIVE | Noted: 2023-11-14

## 2024-09-23 PROBLEM — H53.2 DIPLOPIA: Status: RESOLVED | Noted: 2021-06-23 | Resolved: 2024-09-23

## 2024-09-23 PROBLEM — R27.0 ATAXIA: Status: RESOLVED | Noted: 2024-01-16 | Resolved: 2024-09-23

## 2024-09-23 LAB
HBA1C MFR BLD: 6 % (ref ?–5.8)
POCT INT CON NEG: NEGATIVE
POCT INT CON POS: POSITIVE

## 2024-09-23 PROCEDURE — 83036 HEMOGLOBIN GLYCOSYLATED A1C: CPT | Performed by: STUDENT IN AN ORGANIZED HEALTH CARE EDUCATION/TRAINING PROGRAM

## 2024-09-23 PROCEDURE — RXMED WILLOW AMBULATORY MEDICATION CHARGE: Performed by: NURSE PRACTITIONER

## 2024-09-23 PROCEDURE — 3074F SYST BP LT 130 MM HG: CPT | Performed by: STUDENT IN AN ORGANIZED HEALTH CARE EDUCATION/TRAINING PROGRAM

## 2024-09-23 PROCEDURE — 99215 OFFICE O/P EST HI 40 MIN: CPT | Performed by: STUDENT IN AN ORGANIZED HEALTH CARE EDUCATION/TRAINING PROGRAM

## 2024-09-23 PROCEDURE — 3078F DIAST BP <80 MM HG: CPT | Performed by: STUDENT IN AN ORGANIZED HEALTH CARE EDUCATION/TRAINING PROGRAM

## 2024-09-23 PROCEDURE — G2211 COMPLEX E/M VISIT ADD ON: HCPCS | Performed by: STUDENT IN AN ORGANIZED HEALTH CARE EDUCATION/TRAINING PROGRAM

## 2024-09-23 PROCEDURE — G2212 PROLONG OUTPT/OFFICE VIS: HCPCS | Performed by: STUDENT IN AN ORGANIZED HEALTH CARE EDUCATION/TRAINING PROGRAM

## 2024-09-23 RX ORDER — IPRATROPIUM BROMIDE AND ALBUTEROL 20; 100 UG/1; UG/1
1 SPRAY, METERED RESPIRATORY (INHALATION)
COMMUNITY

## 2024-09-23 RX ORDER — AMLODIPINE BESYLATE 5 MG/1
5 TABLET ORAL DAILY
Qty: 90 TABLET | Refills: 1 | Status: SHIPPED | OUTPATIENT
Start: 2024-09-23

## 2024-09-23 RX ORDER — ALBUTEROL SULFATE 0.83 MG/ML
2.5 SOLUTION RESPIRATORY (INHALATION)
COMMUNITY

## 2024-09-23 RX ORDER — ZIPRASIDONE HYDROCHLORIDE 40 MG/1
40 CAPSULE ORAL
Qty: 90 CAPSULE | Refills: 1 | Status: SHIPPED | OUTPATIENT
Start: 2024-09-23

## 2024-09-23 ASSESSMENT — ENCOUNTER SYMPTOMS
PALPITATIONS: 0
FEVER: 0
WEIGHT LOSS: 0
WHEEZING: 0
VOMITING: 0
CHILLS: 0
DIZZINESS: 0
SHORTNESS OF BREATH: 0
NAUSEA: 0
HEADACHES: 0

## 2024-09-23 ASSESSMENT — FIBROSIS 4 INDEX: FIB4 SCORE: 0.57

## 2024-09-23 NOTE — PROGRESS NOTES
Subjective:     CC:     HPI:   Kristin presents today with    PMH   # Ehler Danos syndrome- hypermobile  # HTN  # Restrictive lung disease (PFT 2019 wo significant bronchodilator response)  - has been on inhalers, with reported improvement  # migraine- ( rimegepant, emgality, topiramate, lamotrigine, sumatriptan),   # TONYA - intolerant of mask  # POTS/ SVT on ivabradine and metoprolol 25mg xr  # hidraadenitis supprativa on humira  # nexplanon  # chronic pain associated with EDS ( gabapentin, ibuprofen, acetaminophen),   # GERD/ gastroparesis related to EDS  # PCOS- spironolactone  # schizo on geodon  # hemorrhoids  # history of idiopathic intracranial hypertension  # hx of transverse myelitis vs functional neurologic disorder  # hx of optic neuritits previously on cellcept ([id number]) - no longer following with neuroophthalmology  # kapil 1:80 (2024)    Specialist  - cardiology - [facility]   - dermatology- [facility] dermatology  - on Humira for hidraadenitis   - gastroenterology - [facility], naf[facility], polyp, colonoscopy   - pain management/ neurology - [facility]  - neuromuscular neurology- [facility]  - ophthalmology- [facility] - EDS visual changes    Device  - interstim stimulator  - Hx of l4-l5 fusion     EDS monitoring/ work up  - 2022 Echocardiogram LVEF 55% - aortic root normal for BSA  - 2022 stress test   - follows with ophthalmology  - hx of spine surgery/ fusion   - CT cervical spine- No acute fracture or dislocation seen in the CT scan of the cervical spine.   - EGD- 2017 dysphagia, gastroparesis  - colonoscopy - 5 year recall      ======================================================    #Swelling in hands and feets   #EDS/ swelling   # arthralgia of carpal joints  - recent lab     Component      Latest Ref Rng 8/12/2024   Antinuclear Antibody (KAPIL), HEp-2, IgG      <1:80  <1:80    KAPIL Interpretive Comment See Note    F-Actin Ab, IgG      0 - 19 Units 3    Antinuclear Antibody      None Detected  Detected !         # EDS   #  "SVT  # Pots  - following with cardiology    # stimulator  # gastroparesis  # incontinence    # NIDDM2  - lifestyle control    Transverse myelitis ??  Report symptoms resolved    Optic neuritis 2019    Mod asthma    Incontinence -     Bilateral hand pain   Bilateral feet pain  - center of hand in carpal pain  - feel swelling, hurts to put weight down on them.   - steroids with humira seems to help with symptoms         Health Maintenance:     ROS:  Review of Systems   Constitutional:  Negative for chills, fever and weight loss.   HENT:  Negative for hearing loss.    Respiratory:  Negative for shortness of breath and wheezing.    Cardiovascular:  Negative for chest pain and palpitations.   Gastrointestinal:  Negative for nausea and vomiting.   Genitourinary:  Negative for frequency and urgency.   Skin:  Negative for rash.   Neurological:  Negative for dizziness and headaches.       Objective:     Exam:  /72 (BP Location: Left arm)   Pulse 73   Temp 36.7 °C (98 °F)   Resp 17   Ht 1.626 m (5' 4\")   Wt 123 kg (270 lb 1 oz)   SpO2 96%   BMI 46.36 kg/m²  Body mass index is 46.36 kg/m².    Physical Exam  Constitutional:       Appearance: Normal appearance.   Cardiovascular:      Rate and Rhythm: Normal rate and regular rhythm.      Heart sounds: No murmur heard.  Pulmonary:      Effort: Pulmonary effort is normal.      Breath sounds: Normal breath sounds. No wheezing.   Musculoskeletal:      Cervical back: Normal range of motion and neck supple.   Lymphadenopathy:      Cervical: No cervical adenopathy.   Neurological:      Mental Status: She is alert.           Labs:     Assessment & Plan:     33 y.o. female with the following -   1. Type 2 diabetes mellitus with other specified complication, without long-term current use of insulin (HCC)  Chronic, stable  Lifestyle control  Denies signs and symptoms of hyperglycemia  - POCT Hemoglobin A1C  - CBC WITHOUT DIFFERENTIAL; Future  - Lipid Profile; Future  - TSH WITH " REFLEX TO FT4; Future  - Comp Metabolic Panel; Future  - MICROALBUMIN CREAT RATIO URINE; Future    2. Mixed stress and urge urinary incontinence  Status post InterStim    3. Hidradenitis axillaris  Chronic, stable  Follow-up with dermatologist  Patient is currently on Humira denies signs and symptoms of infection    4. Gastroesophageal reflux disease without esophagitis  Chronic, stable  On daily PPI  Follows with gastroenterology    5. Bilateral hand swelling  6. Arthralgia, unspecified joint  Chronic, stable  Patient is concerned that she may have a rheumatological issue  She reports she does note carpal pain and sometimes foot pain and swelling around the palm and back of palm region and mid foot  She has not had prior RF CCP levels within normal limits most recently had AI that showed 1:80 ratio  Plan  At this time would recommend patient obtain repeat lab work to see if there is additional changes, imaging prior to consideration for rheumatology workup per history probably less likely rheumatological  Of note EDS is not a autoimmune disease  - DX-JOINT SURVEY-HANDS SINGLE VIEW; Future  - RHEUMATOID ARTHRITIS FACTOR; Future  - CCP  - Sed Rate; Future  - CRP QUANTITIVE (NON-CARDIAC); Future  - CBC WITHOUT DIFFERENTIAL; Future  - Lipid Profile; Future  - TSH WITH REFLEX TO FT4; Future  - Comp Metabolic Panel; Future  - MICROALBUMIN CREAT RATIO URINE; Future    7. Schizoaffective disorder, depressive type (HCC)  Chronic, stable  Patient has been stable on Geodon  - ziprasidone (GEODON) 40 MG Cap; Take 1 capsule by mouth at bedtime.  Dispense: 90 Capsule; Refill: 1    8. Primary hypertension  Chronic, stable medication refilled  - amLODIPine (NORVASC) 5 MG Tab; Take 1 tablet by mouth every day.  Dispense: 90 Tablet; Refill: 1    9. Chronic restrictive lung disease  PFT reviewed    10. Morbid obesity with BMI of 40.0-44.9, adult (HCC)    - Patient identified as having weight management issue.  Appropriate orders and  counseling given.          Return in about 3 months (around 12/23/2024) for Lab review, Med check, 40 minutes visit .    I spent 73 minutes reviewing patient's prior chart,, prior workup, as noted in HPI.  Patient is medically complex with significant medical condition.  We went through her medication and her problem list.  Time was also spent on documentation physical exam counseling.    Please note that this dictation was created using voice recognition software. I have made every reasonable attempt to correct obvious errors, but I expect that there are errors of grammar and possibly content that I did not discover before finalizing the note.

## 2024-09-25 ENCOUNTER — HOSPITAL ENCOUNTER (OUTPATIENT)
Dept: RADIOLOGY | Facility: MEDICAL CENTER | Age: 34
End: 2024-09-25
Attending: STUDENT IN AN ORGANIZED HEALTH CARE EDUCATION/TRAINING PROGRAM
Payer: MEDICARE

## 2024-09-25 DIAGNOSIS — M25.50 ARTHRALGIA, UNSPECIFIED JOINT: ICD-10-CM

## 2024-09-25 PROCEDURE — RXMED WILLOW AMBULATORY MEDICATION CHARGE: Performed by: NURSE PRACTITIONER

## 2024-09-25 PROCEDURE — RXMED WILLOW AMBULATORY MEDICATION CHARGE

## 2024-09-25 PROCEDURE — 77077 JOINT SURVEY SINGLE VIEW: CPT

## 2024-09-26 ENCOUNTER — DOCUMENTATION (OUTPATIENT)
Dept: PHARMACY | Facility: MEDICAL CENTER | Age: 34
End: 2024-09-26
Payer: MEDICARE

## 2024-09-26 NOTE — PROGRESS NOTES
ICD-10 Capture: Chronic migraine without aura, not intractable, without status migrainosus [G43.709]    Patient only filling Emgality with the pharmacy, clinical does not actively follow due to low risk. We will assist as needed for any questions or concerns.    Med not clinically followed by [facility] team   gap list patient first filled on 9/10/24

## 2024-09-27 ENCOUNTER — PHARMACY VISIT (OUTPATIENT)
Dept: PHARMACY | Facility: MEDICAL CENTER | Age: 34
End: 2024-09-27
Payer: COMMERCIAL

## 2024-09-28 ENCOUNTER — OFFICE VISIT (OUTPATIENT)
Dept: URGENT CARE | Facility: CLINIC | Age: 34
End: 2024-09-28
Payer: MEDICARE

## 2024-09-28 ENCOUNTER — HOSPITAL ENCOUNTER (OUTPATIENT)
Facility: MEDICAL CENTER | Age: 34
End: 2024-09-28
Attending: PHYSICIAN ASSISTANT
Payer: MEDICARE

## 2024-09-28 ENCOUNTER — PHARMACY VISIT (OUTPATIENT)
Dept: PHARMACY | Facility: MEDICAL CENTER | Age: 34
End: 2024-09-28
Payer: COMMERCIAL

## 2024-09-28 VITALS
OXYGEN SATURATION: 98 % | WEIGHT: 270 LBS | TEMPERATURE: 97.6 F | HEART RATE: 75 BPM | HEIGHT: 64 IN | SYSTOLIC BLOOD PRESSURE: 122 MMHG | BODY MASS INDEX: 46.1 KG/M2 | RESPIRATION RATE: 20 BRPM | DIASTOLIC BLOOD PRESSURE: 76 MMHG

## 2024-09-28 DIAGNOSIS — N39.0 URINARY TRACT INFECTION WITHOUT HEMATURIA, SITE UNSPECIFIED: ICD-10-CM

## 2024-09-28 DIAGNOSIS — R39.9 UTI SYMPTOMS: ICD-10-CM

## 2024-09-28 LAB
APPEARANCE UR: NORMAL
BILIRUB UR STRIP-MCNC: NEGATIVE MG/DL
COLOR UR AUTO: YELLOW
GLUCOSE UR STRIP.AUTO-MCNC: NEGATIVE MG/DL
KETONES UR STRIP.AUTO-MCNC: NEGATIVE MG/DL
LEUKOCYTE ESTERASE UR QL STRIP.AUTO: NORMAL
NITRITE UR QL STRIP.AUTO: NEGATIVE
PH UR STRIP.AUTO: 6 [PH] (ref 5–8)
PROT UR QL STRIP: NEGATIVE MG/DL
RBC UR QL AUTO: NEGATIVE
SP GR UR STRIP.AUTO: 1.02
UROBILINOGEN UR STRIP-MCNC: NORMAL MG/DL

## 2024-09-28 PROCEDURE — RXMED WILLOW AMBULATORY MEDICATION CHARGE: Performed by: PHYSICIAN ASSISTANT

## 2024-09-28 PROCEDURE — 87086 URINE CULTURE/COLONY COUNT: CPT

## 2024-09-28 RX ORDER — NITROFURANTOIN 25; 75 MG/1; MG/1
100 CAPSULE ORAL 2 TIMES DAILY
Qty: 10 CAPSULE | Refills: 0 | Status: SHIPPED | OUTPATIENT
Start: 2024-09-28 | End: 2024-10-03

## 2024-09-28 ASSESSMENT — ENCOUNTER SYMPTOMS
FEVER: 0
VOMITING: 0
ABDOMINAL PAIN: 1
FLANK PAIN: 1
EYE REDNESS: 0
EYE DISCHARGE: 0
NAUSEA: 0

## 2024-09-28 ASSESSMENT — FIBROSIS 4 INDEX: FIB4 SCORE: 0.57

## 2024-09-28 NOTE — PROGRESS NOTES
Subjective     Kristin Balderrama is a 33 y.o. female who presents with UTI (X today/ burning in urination/ odors )            UTI  This is a new problem. The current episode started today. The problem occurs constantly. The problem has been unchanged. Associated symptoms include abdominal pain (The patient reports suprapubic abdominal pain/discomfort.) and urinary symptoms (The patient reports dysuria with urinary frequency and urinary urgency.  She reports no hematuria.  The patient states she is experiencing a burning sensation to her right flank.). Pertinent negatives include no fever, nausea or vomiting. She has tried acetaminophen and NSAIDs for the symptoms.     The patient reports a history of kidney stones.    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  MEDS:   Current Outpatient Medications:     albuterol (PROVENTIL) 2.5mg/3ml Nebu Soln solution for nebulization, Take 2.5 mg by nebulization., Disp: , Rfl:     ipratropium-albuterol (COMBIVENT RESPIMAT)  MCG/ACT Aero Soln, Inhale 1 Puff., Disp: , Rfl:     ziprasidone (GEODON) 40 MG Cap, Take 1  capsule by mouth at bedtime., Disp: 90 Capsule, Rfl: 1    amLODIPine (NORVASC) 5 MG Tab, Take 1 tablet by mouth every day., Disp: 90 Tablet, Rfl: 1    metoprolol SR (TOPROL XL) 25 MG TABLET SR 24 HR, Take 1 Tablet by mouth 2 times a day., Disp: 200 Tablet, Rfl: 3    Galcanezumab-gnlm (EMGALITY) 120 MG/ML Solution Auto-injector, Inject 1 mL subcutaneously every month., Disp: 1 mL, Rfl: 11    lamoTRIgine (LAMICTAL) 25 MG Tab, Take 2 Tablets by mouth 2 times a day., Disp: 360 Tablet, Rfl: 1    Rimegepant Sulfate (NURTEC) 75 MG TABLET DISPERSIBLE, Take 1 tablet by mouth at onset of migraine or aura okay to repeat in 24 hours if needed., Disp: 8 Tablet, Rfl: 5    ondansetron (ZOFRAN ODT) 4 MG TABLET DISPERSIBLE, Take 1-2 tablets by mouth at onset of nausea or vomiting okay to repeat in 12 hours if needed, Disp: 20 Tablet, Rfl: 2    sumatriptan (IMITREX) 20 MG/ACT nasal spray, Give 1 dose at onset headache okay to repeat in 2 hours if needed for max of 2 doses in a 24 hour timeframe., Disp: 6 Each, Rfl: 5    ivabradine (CORLANOR) 7.5 MG Tab tablet, Take 1 Tablet by mouth 2 times a day with meals., Disp: 60 Tablet, Rfl: 3    diphenhydrAMINE (BENADRYL) 25 MG Tab, Take 25-50 mg by mouth 2 times a day. Scheduled  25 mg = AM 50 MG = PM, Disp: , Rfl:     Melatonin 10 MG Tab, Take 10 mg by mouth at bedtime., Disp: , Rfl:     topiramate (TOPAMAX) 50 MG tablet, Take 50 mg by mouth 2 times a day., Disp: , Rfl:     spironolactone (ALDACTONE) 25 MG Tab, 25 mg every day., Disp: , Rfl:     ibuprofen (MOTRIN) 800 MG Tab, Take 1 Tablet by mouth every 8 hours as needed for Mild Pain., Disp: 30 Tablet, Rfl: 0    adalimumab (HUMIRA) 80 MG/0.8ML injection, Inject 80 mg under the skin every 14 days. Last injection was on 3/14/2024, Disp: , Rfl:     gabapentin (NEURONTIN) 400 MG Cap, Take 1,200 mg by mouth 3 times a day. 3 capsules=1200mg, Disp: , Rfl:     omeprazole (PRILOSEC) 20 MG delayed-release capsule, Take 20 mg by mouth 2 times a day.,  "Disp: , Rfl:     etonogestrel (NEXPLANON) 68 MG Implant implant, Inject 68 mg under the skin see administration instructions. Implant in left arm Every 3 years to change, thinks it was placed 2021, Disp: , Rfl:   ALLERGIES:   Allergies   Allergen Reactions    Cefdinir Shortness of Breath and Itching     Tolerated 1/18/17  Tolerates ceftriaxone  Tolerated augmentin 8/2019     Depakote [Divalproex Sodium] Unspecified     Muscle spasms/muscle pain and weakness      Depakote [Divalproex Sodium]      Muscle spasms/muscle pain and weakness    Doxycycline Anaphylaxis and Vomiting     Other reaction(s): pustules/blisters  Other reaction(s): stomach pain    Montelukast      Cardiac effusion    Montelukast [Singulair] Unspecified     Cardiac effusion    Vancomycin Itching     Pt becomes flushed in face and chest.   RXN=7/10/16    Wound Dressing Adhesive Rash     By pt report-\"removes skin totally off\"    Amitriptyline Unspecified     Headaches      Amoxicillin Rash          Aripiprazole Unspecified     Headaches/muscle twitching      Aripiprazole [Abilify] Unspecified     Headaches/muscle twitching      Clindamycin Nausea         Other reaction(s): nausea stomach pain    Erythromycin Rash     .  Other reaction(s): nausea stomach pain    Flomax [Tamsulosin Hydrochloride] Swelling    Hydromorphone      Other reaction(s): vomiting    Levaquin Unspecified     Severe muscle cramps in legs  RXN=unknown  Other reaction(s): leg muscle cramps    Metformin Unspecified     Increased lactic acid     Other reaction(s): itching and rash/nausea vomiting    Sulfa Drugs Hives, Itching, Myalgia and Unspecified     Muscle pain and weakness    Other reaction(s): unknown    Tamsulosin Swelling     Swelling of legs    Tape Rash     Tears skin off  coban with Tegaderm tape ok intermittently  RXN=ongoing    Sulfamethoxazole W-Trimethoprim Rash    Ampicillin Rash     Pt reports that she received a rash     Ciprofloxacin Rash          Keflex Rash     " "Pt states she gets a rash with this medication  Tolerates ceftriaxone  Can take with Benadryl    Levofloxacin Unspecified     Leg muscle cramps    Metronidazole Rash     \"Vision problems\"  Other reaction(s): vision problems    Penicillins Hives     Can take with Benadryl    Valproic Acid Rash     .     SURGHX:   Past Surgical History:   Procedure Laterality Date    AZ CYSTOSCOPY,INSERT URETERAL STENT Right 2/12/2024    Procedure: CYSTOSCOPY, WITH RIGHT URETEROSCOPY, WITH LITHOTRIPSY, WITH INSERTION OF RIGHT URETERAL STENT;  Surgeon: Josafat Roberson M.D.;  Location: [facility];  Service: Urology    AZ CYSTO/URETERO/PYELOSCOPY, DX Right 2/12/2024    Procedure: URETEROSCOPY;  Surgeon: Josafat Roberson M.D.;  Location: [facility];  Service: Urology    LASERTRIPSY Right 2/12/2024    Procedure: LITHOTRIPSY, USING LASER;  Surgeon: Josafat Roberson M.D.;  Location: [facility];  Service: Urology    INGUINAL HERNIA LAPAROSCOPIC Right 07/21/2023    Procedure: LAPAROSCOPIC RIGHT INGUINAL HERNIA REPAIR WITH MESH;  Surgeon: Joe Noyola M.D.;  Location: [facility];  Service: General    HERNIA REPAIR Right 07/21/2023    PB PERCUT FIX PBOX/NECK FEMUR FX Left 01/28/2023    Procedure: FIXATION, HIP, USING CANNULATED SCREW;  Surgeon: Noman Abdul M.D.;  Location: [facility];  Service: Orthopedics    AZ LAP,DIAGNOSTIC ABDOMEN  02/14/2022    Procedure: LAPAROSCOPY;  Surgeon: Seamus Pisano M.D.;  Location: SURGERY SAME DAY [facility];  Service: Gynecology    OVARIAN CYSTECTOMY Right 02/14/2022    Procedure: EXCISION, CYST, OVARY;  Surgeon: Seamus Pisano M.D.;  Location: SURGERY SAME DAY [facility];  Service: Gynecology    BOWEL STIMULATOR INSERTION  03/10/2021    Procedure: INSERTION, ELECTRODE LEADS AND PULSE GENERATOR, NEUROSTIMULATOR, SACRAL - REMOVAL OF INTERSTIM WITH REPLACEMENT OF SACRAL NEUROMODULATION DEVICE;  Surgeon: Joe Noyola M.D.;  Location: [facility];  " "Service: General    MUSCLE BIOPSY Right 01/26/2017    Procedure: MUSCLE BIOPSY - THIGH;  Surgeon: Isidro Vigil M.D.;  Location: SURGERY [facility];  Service:     GASTROSCOPY WITH BALLOON DILATATION N/A 01/04/2017    Procedure: GASTROSCOPY WITH DILATATION;  Surgeon: Torres Vargas M.D.;  Location: SURGERY [facility];  Service:     BOWEL STIMULATOR INSERTION  07/13/2016    Procedure: BOWEL STIMULATOR INSERTION FOR PERMANENT INTERSTIM SACRAL IMPLANT;  Surgeon: Joe Noyola M.D.;  Location: SURGERY [facility];  Service:     RECOVERY  01/27/2016    Procedure: CATH LAB EP STUDY WITH SINUS NODE MODIFICATION LA;  Surgeon: [facility];  [facility];  Service:     OTHER CARDIAC SURGERY  01/2016    cardiac ablation    NEURO DEST FACET L/S W/IG SNGL  04/21/2015    Performed by Reza Tabor at SURGERY [facility]    LUMBAR FUSION ANTERIOR  08/21/2012    Performed by SUSIE SAWANT at SURGERY [facility] ORS    ALYSSA BY LAPAROSCOPY  08/29/2010    Performed by SHAYY JOHNS at SURGERY [facility] ORS    LAMINOTOMY      OTHER ABDOMINAL SURGERY      TONSILLECTOMY      tonsillectomy     SOCHX:  reports that she has never smoked. She has never used smokeless tobacco. She reports that she does not drink alcohol and does not use drugs.  FH: Family history was reviewed, no pertinent findings to report      Review of Systems   Constitutional:  Negative for fever.   Eyes:  Negative for discharge and redness.   Gastrointestinal:  Positive for abdominal pain (The patient reports suprapubic abdominal pain/discomfort.). Negative for nausea and vomiting.   Genitourinary:  Positive for dysuria, flank pain, frequency and urgency. Negative for hematuria.              Objective     /76   Pulse 75   Temp 36.4 °C (97.6 °F)   Resp 20   Ht 1.626 m (5' 4\")   Wt 122 kg (270 lb)   SpO2 98%   BMI 46.35 kg/m²      Physical Exam  Constitutional:       General: She is not in " acute distress.     Appearance: Normal appearance. She is well-developed. She is not ill-appearing.   HENT:      Head: Normocephalic and atraumatic.      Right Ear: External ear normal.      Left Ear: External ear normal.   Eyes:      Extraocular Movements: Extraocular movements intact.      Conjunctiva/sclera: Conjunctivae normal.   Cardiovascular:      Rate and Rhythm: Normal rate and regular rhythm.      Heart sounds: Normal heart sounds.   Pulmonary:      Effort: Pulmonary effort is normal. No respiratory distress.      Breath sounds: Normal breath sounds. No wheezing.   Abdominal:      Palpations: Abdomen is soft.      Tenderness: There is abdominal tenderness (slight suprapubic abdominal tenderness). There is right CVA tenderness (mild). There is no left CVA tenderness.   Musculoskeletal:         General: Normal range of motion.      Cervical back: Normal range of motion and neck supple.   Skin:     General: Skin is warm and dry.   Neurological:      Mental Status: She is alert and oriented to person, place, and time.               Progress:  Results for orders placed or performed in visit on 09/28/24   POCT Urinalysis   Result Value Ref Range    POC Color YELLOW Negative    POC Appearance CLOUDY Negative    POC Glucose NEGATIVE Negative mg/dL    POC Bilirubin NEGATIVE Negative mg/dL    POC Ketones NEGATIVE Negative mg/dL    POC Specific Gravity 1.025 <1.005 - >1.030    POC Blood NEGATIVE Negative    POC Urine PH 6.0 5.0 - 8.0    POC Protein NEGATIVE Negative mg/dL    POC Urobiligen 0.2 E.U. /DL Negative (0.2) mg/dL    POC Nitrites NEGATIVE Negative    POC Leukocyte Esterase SMALL Negative     *Note: Due to a large number of results and/or encounters for the requested time period, some results have not been displayed. A complete set of results can be found in Results Review.        Urine Culture - pending     Offered to order the patient a CT renal colic to further evaluate her right flank pain and to rule out  possible kidney stone.  The patient declined CT evaluation at this time, and elects to treat for possible UTI and monitor her symptoms.                Assessment & Plan        Assessment & Plan  UTI symptoms    Orders:    POCT Urinalysis    Urinary tract infection without hematuria, site unspecified    Orders:    nitrofurantoin (MACROBID) 100 MG Cap; Take 1 Capsule by mouth 2 times a day for 5 days.            The patient's presenting symptoms and physical exam findings are consistent with UTI symptoms with associated dysuria and urinary frequency.  On physical exam, the patient had slight suprapubic abdominal tenderness without guarding or rebound.  The patient also had mild right-sided CVA tenderness.  The remainder the patient's physical exam today in clinic was normal.  The patient is nontoxic and appears in no acute distress.  The patient's vital signs are stable and within normal limits.  She is afebrile today in clinic.  The patient's POCT urinalysis today in clinic showed small leukocyte esterase with negative blood and negative nitrites.  Will culture the patient's urine to identify a likely bacterial source.  Offered to order the patient a CT renal colic to further evaluate her right flank pain and to rule out possible kidney stone.  The patient declined CT evaluation at this time, and elects to treat for possible UTI and monitor her symptoms.  Will prescribe the patient Macrobid for presumed urinary tract infection.  Advised the patient to monitor for worsening signs or symptoms.  Recommend OTC medications and supportive care for symptomatic management.  Recommend the patient follow-up with primary care as needed.  Discussed return precautions with the patient, and she verbalized understanding.    Differential diagnoses, supportive care, and indications for immediate follow-up discussed with patient.   Instructed to return to clinic or nearest emergency department for any change in condition, further  concerns, or worsening of symptoms.    OTC Tylenol or Motrin for fever/discomfort.  Drink plenty of fluids  Follow-up with PCP  Return to clinic or go to the ED if symptoms worsen or fail to improve, or if the patient should develop worsening/increasing urinary symptoms, hematuria, flank pain, abdominal pain, nausea/vomiting, fever/chills, and/or any concerning symptoms.    Discussed plan with the patient, and she agrees to the above.     I personally reviewed prior external notes and test results pertinent to today's visit.  I have independently reviewed and interpreted all diagnostics ordered during this urgent care visit.     Please note that this dictation was created using voice recognition software. I have made every reasonable attempt to correct obvious errors, but I expect that there may be errors of grammar and possibly content that I did not discover before finalizing the note.     This note was electronically signed by Patsy Hurd PA-C

## 2024-09-30 ENCOUNTER — HOSPITAL ENCOUNTER (OUTPATIENT)
Dept: RADIOLOGY | Facility: MEDICAL CENTER | Age: 34
End: 2024-09-30
Attending: STUDENT IN AN ORGANIZED HEALTH CARE EDUCATION/TRAINING PROGRAM
Payer: MEDICARE

## 2024-09-30 ENCOUNTER — HOSPITAL ENCOUNTER (OUTPATIENT)
Dept: LAB | Facility: MEDICAL CENTER | Age: 34
End: 2024-09-30
Attending: STUDENT IN AN ORGANIZED HEALTH CARE EDUCATION/TRAINING PROGRAM
Payer: MEDICARE

## 2024-09-30 DIAGNOSIS — M25.50 ARTHRALGIA, UNSPECIFIED JOINT: ICD-10-CM

## 2024-09-30 DIAGNOSIS — N20.0 CALCULUS OF KIDNEY: ICD-10-CM

## 2024-09-30 DIAGNOSIS — E11.69 TYPE 2 DIABETES MELLITUS WITH OTHER SPECIFIED COMPLICATION, WITHOUT LONG-TERM CURRENT USE OF INSULIN (HCC): ICD-10-CM

## 2024-09-30 LAB
ALBUMIN SERPL BCP-MCNC: 4.4 G/DL (ref 3.2–4.9)
ALBUMIN/GLOB SERPL: 2.2 G/DL
ALP SERPL-CCNC: 76 U/L (ref 30–99)
ALT SERPL-CCNC: 40 U/L (ref 2–50)
ANION GAP SERPL CALC-SCNC: 15 MMOL/L (ref 7–16)
AST SERPL-CCNC: 21 U/L (ref 12–45)
BACTERIA UR CULT: NORMAL
BILIRUB SERPL-MCNC: 0.4 MG/DL (ref 0.1–1.5)
BUN SERPL-MCNC: 11 MG/DL (ref 8–22)
CALCIUM ALBUM COR SERPL-MCNC: 9.2 MG/DL (ref 8.5–10.5)
CALCIUM SERPL-MCNC: 9.5 MG/DL (ref 8.5–10.5)
CHLORIDE SERPL-SCNC: 110 MMOL/L (ref 96–112)
CHOLEST SERPL-MCNC: 198 MG/DL (ref 100–199)
CO2 SERPL-SCNC: 15 MMOL/L (ref 20–33)
CREAT SERPL-MCNC: 0.6 MG/DL (ref 0.5–1.4)
CRP SERPL HS-MCNC: <0.3 MG/DL (ref 0–0.75)
ERYTHROCYTE [DISTWIDTH] IN BLOOD BY AUTOMATED COUNT: 43.3 FL (ref 35.9–50)
ERYTHROCYTE [SEDIMENTATION RATE] IN BLOOD BY WESTERGREN METHOD: 7 MM/HOUR (ref 0–25)
GFR SERPLBLD CREATININE-BSD FMLA CKD-EPI: 120 ML/MIN/1.73 M 2
GLOBULIN SER CALC-MCNC: 2 G/DL (ref 1.9–3.5)
GLUCOSE SERPL-MCNC: 138 MG/DL (ref 65–99)
HCT VFR BLD AUTO: 41.6 % (ref 37–47)
HDLC SERPL-MCNC: 44 MG/DL
HGB BLD-MCNC: 14.4 G/DL (ref 12–16)
LDLC SERPL CALC-MCNC: 119 MG/DL
MCH RBC QN AUTO: 32.4 PG (ref 27–33)
MCHC RBC AUTO-ENTMCNC: 34.6 G/DL (ref 32.2–35.5)
MCV RBC AUTO: 93.5 FL (ref 81.4–97.8)
PLATELET # BLD AUTO: 169 K/UL (ref 164–446)
PMV BLD AUTO: 11.8 FL (ref 9–12.9)
POTASSIUM SERPL-SCNC: 4.3 MMOL/L (ref 3.6–5.5)
PROT SERPL-MCNC: 6.4 G/DL (ref 6–8.2)
RBC # BLD AUTO: 4.45 M/UL (ref 4.2–5.4)
RHEUMATOID FACT SER IA-ACNC: <10 IU/ML (ref 0–14)
SIGNIFICANT IND 70042: NORMAL
SITE SITE: NORMAL
SODIUM SERPL-SCNC: 140 MMOL/L (ref 135–145)
SOURCE SOURCE: NORMAL
TRIGL SERPL-MCNC: 175 MG/DL (ref 0–149)
TSH SERPL DL<=0.005 MIU/L-ACNC: 3.2 UIU/ML (ref 0.38–5.33)
WBC # BLD AUTO: 4.6 K/UL (ref 4.8–10.8)

## 2024-09-30 PROCEDURE — 85652 RBC SED RATE AUTOMATED: CPT

## 2024-09-30 PROCEDURE — 86200 CCP ANTIBODY: CPT

## 2024-09-30 PROCEDURE — 80061 LIPID PANEL: CPT

## 2024-09-30 PROCEDURE — 36415 COLL VENOUS BLD VENIPUNCTURE: CPT

## 2024-09-30 PROCEDURE — 82570 ASSAY OF URINE CREATININE: CPT

## 2024-09-30 PROCEDURE — 84443 ASSAY THYROID STIM HORMONE: CPT

## 2024-09-30 PROCEDURE — 86431 RHEUMATOID FACTOR QUANT: CPT

## 2024-09-30 PROCEDURE — 80053 COMPREHEN METABOLIC PANEL: CPT

## 2024-09-30 PROCEDURE — 82043 UR ALBUMIN QUANTITATIVE: CPT

## 2024-09-30 PROCEDURE — 85027 COMPLETE CBC AUTOMATED: CPT

## 2024-09-30 PROCEDURE — 74018 RADEX ABDOMEN 1 VIEW: CPT

## 2024-09-30 PROCEDURE — 86140 C-REACTIVE PROTEIN: CPT

## 2024-10-01 ENCOUNTER — HOSPITAL ENCOUNTER (EMERGENCY)
Facility: MEDICAL CENTER | Age: 34
End: 2024-10-01
Attending: EMERGENCY MEDICINE
Payer: MEDICARE

## 2024-10-01 ENCOUNTER — APPOINTMENT (OUTPATIENT)
Dept: RADIOLOGY | Facility: MEDICAL CENTER | Age: 34
End: 2024-10-01
Attending: EMERGENCY MEDICINE
Payer: MEDICARE

## 2024-10-01 VITALS
HEIGHT: 64 IN | TEMPERATURE: 97.8 F | DIASTOLIC BLOOD PRESSURE: 57 MMHG | SYSTOLIC BLOOD PRESSURE: 129 MMHG | OXYGEN SATURATION: 94 % | BODY MASS INDEX: 46.1 KG/M2 | WEIGHT: 270 LBS | HEART RATE: 81 BPM | RESPIRATION RATE: 18 BRPM

## 2024-10-01 DIAGNOSIS — R10.9 FLANK PAIN: ICD-10-CM

## 2024-10-01 LAB
ALBUMIN SERPL BCP-MCNC: 4.6 G/DL (ref 3.2–4.9)
ALBUMIN/GLOB SERPL: 2.3 G/DL
ALP SERPL-CCNC: 81 U/L (ref 30–99)
ALT SERPL-CCNC: 38 U/L (ref 2–50)
ANION GAP SERPL CALC-SCNC: 11 MMOL/L (ref 7–16)
APPEARANCE UR: ABNORMAL
AST SERPL-CCNC: 21 U/L (ref 12–45)
BACTERIA #/AREA URNS HPF: NEGATIVE /HPF
BASOPHILS # BLD AUTO: 1 % (ref 0–1.8)
BASOPHILS # BLD: 0.05 K/UL (ref 0–0.12)
BILIRUB SERPL-MCNC: 0.4 MG/DL (ref 0.1–1.5)
BILIRUB UR QL STRIP.AUTO: NEGATIVE
BUN SERPL-MCNC: 18 MG/DL (ref 8–22)
CALCIUM ALBUM COR SERPL-MCNC: 8.7 MG/DL (ref 8.5–10.5)
CALCIUM SERPL-MCNC: 9.2 MG/DL (ref 8.5–10.5)
CHLORIDE SERPL-SCNC: 113 MMOL/L (ref 96–112)
CO2 SERPL-SCNC: 16 MMOL/L (ref 20–33)
COLOR UR: YELLOW
CREAT SERPL-MCNC: 0.98 MG/DL (ref 0.5–1.4)
CREAT UR-MCNC: 109 MG/DL
EOSINOPHIL # BLD AUTO: 0.27 K/UL (ref 0–0.51)
EOSINOPHIL NFR BLD: 5.2 % (ref 0–6.9)
EPI CELLS #/AREA URNS HPF: ABNORMAL /HPF
ERYTHROCYTE [DISTWIDTH] IN BLOOD BY AUTOMATED COUNT: 43.7 FL (ref 35.9–50)
GFR SERPLBLD CREATININE-BSD FMLA CKD-EPI: 77 ML/MIN/1.73 M 2
GLOBULIN SER CALC-MCNC: 2 G/DL (ref 1.9–3.5)
GLUCOSE SERPL-MCNC: 179 MG/DL (ref 65–99)
GLUCOSE UR STRIP.AUTO-MCNC: NEGATIVE MG/DL
HCG SERPL QL: NEGATIVE
HCT VFR BLD AUTO: 42.8 % (ref 37–47)
HGB BLD-MCNC: 14.9 G/DL (ref 12–16)
HYALINE CASTS #/AREA URNS LPF: ABNORMAL /LPF
IMM GRANULOCYTES # BLD AUTO: 0.03 K/UL (ref 0–0.11)
IMM GRANULOCYTES NFR BLD AUTO: 0.6 % (ref 0–0.9)
KETONES UR STRIP.AUTO-MCNC: ABNORMAL MG/DL
LEUKOCYTE ESTERASE UR QL STRIP.AUTO: ABNORMAL
LIPASE SERPL-CCNC: 35 U/L (ref 11–82)
LYMPHOCYTES # BLD AUTO: 1.52 K/UL (ref 1–4.8)
LYMPHOCYTES NFR BLD: 29.1 % (ref 22–41)
MCH RBC QN AUTO: 32.4 PG (ref 27–33)
MCHC RBC AUTO-ENTMCNC: 34.8 G/DL (ref 32.2–35.5)
MCV RBC AUTO: 93 FL (ref 81.4–97.8)
MICRO URNS: ABNORMAL
MICROALBUMIN UR-MCNC: 2.9 MG/DL
MICROALBUMIN/CREAT UR: 27 MG/G (ref 0–30)
MONOCYTES # BLD AUTO: 0.56 K/UL (ref 0–0.85)
MONOCYTES NFR BLD AUTO: 10.7 % (ref 0–13.4)
NEUTROPHILS # BLD AUTO: 2.79 K/UL (ref 1.82–7.42)
NEUTROPHILS NFR BLD: 53.4 % (ref 44–72)
NITRITE UR QL STRIP.AUTO: NEGATIVE
NRBC # BLD AUTO: 0 K/UL
NRBC BLD-RTO: 0 /100 WBC (ref 0–0.2)
PH UR STRIP.AUTO: 6.5 [PH] (ref 5–8)
PLATELET # BLD AUTO: 172 K/UL (ref 164–446)
PMV BLD AUTO: 11.4 FL (ref 9–12.9)
POTASSIUM SERPL-SCNC: 4.1 MMOL/L (ref 3.6–5.5)
PROT SERPL-MCNC: 6.6 G/DL (ref 6–8.2)
PROT UR QL STRIP: NEGATIVE MG/DL
RBC # BLD AUTO: 4.6 M/UL (ref 4.2–5.4)
RBC # URNS HPF: ABNORMAL /HPF
RBC UR QL AUTO: NEGATIVE
SODIUM SERPL-SCNC: 140 MMOL/L (ref 135–145)
SP GR UR STRIP.AUTO: 1.03
UROBILINOGEN UR STRIP.AUTO-MCNC: 1 MG/DL
WBC # BLD AUTO: 5.2 K/UL (ref 4.8–10.8)
WBC #/AREA URNS HPF: ABNORMAL /HPF

## 2024-10-01 PROCEDURE — 99284 EMERGENCY DEPT VISIT MOD MDM: CPT

## 2024-10-01 PROCEDURE — 80053 COMPREHEN METABOLIC PANEL: CPT

## 2024-10-01 PROCEDURE — 84703 CHORIONIC GONADOTROPIN ASSAY: CPT

## 2024-10-01 PROCEDURE — 96374 THER/PROPH/DIAG INJ IV PUSH: CPT

## 2024-10-01 PROCEDURE — 81001 URINALYSIS AUTO W/SCOPE: CPT

## 2024-10-01 PROCEDURE — 96375 TX/PRO/DX INJ NEW DRUG ADDON: CPT

## 2024-10-01 PROCEDURE — 74177 CT ABD & PELVIS W/CONTRAST: CPT

## 2024-10-01 PROCEDURE — 700111 HCHG RX REV CODE 636 W/ 250 OVERRIDE (IP): Mod: JZ,UD | Performed by: EMERGENCY MEDICINE

## 2024-10-01 PROCEDURE — 83690 ASSAY OF LIPASE: CPT

## 2024-10-01 PROCEDURE — 85025 COMPLETE CBC W/AUTO DIFF WBC: CPT

## 2024-10-01 PROCEDURE — 96376 TX/PRO/DX INJ SAME DRUG ADON: CPT

## 2024-10-01 PROCEDURE — 36415 COLL VENOUS BLD VENIPUNCTURE: CPT

## 2024-10-01 PROCEDURE — 700117 HCHG RX CONTRAST REV CODE 255: Mod: UD | Performed by: EMERGENCY MEDICINE

## 2024-10-01 RX ORDER — MORPHINE SULFATE 4 MG/ML
4 INJECTION INTRAVENOUS ONCE
Status: COMPLETED | OUTPATIENT
Start: 2024-10-01 | End: 2024-10-01

## 2024-10-01 RX ORDER — ONDANSETRON 2 MG/ML
4 INJECTION INTRAMUSCULAR; INTRAVENOUS ONCE
Status: COMPLETED | OUTPATIENT
Start: 2024-10-01 | End: 2024-10-01

## 2024-10-01 RX ADMIN — ONDANSETRON 4 MG: 2 INJECTION INTRAMUSCULAR; INTRAVENOUS at 06:46

## 2024-10-01 RX ADMIN — MORPHINE SULFATE 4 MG: 4 INJECTION INTRAVENOUS at 06:46

## 2024-10-01 RX ADMIN — MORPHINE SULFATE 4 MG: 4 INJECTION INTRAVENOUS at 09:36

## 2024-10-01 RX ADMIN — IOHEXOL 100 ML: 350 INJECTION, SOLUTION INTRAVENOUS at 10:17

## 2024-10-01 ASSESSMENT — PAIN DESCRIPTION - PAIN TYPE
TYPE: ACUTE PAIN
TYPE: ACUTE PAIN

## 2024-10-01 ASSESSMENT — FIBROSIS 4 INDEX: FIB4 SCORE: 0.67

## 2024-10-02 LAB — CCP IGA+IGG SERPL IA-ACNC: 4 UNITS (ref 0–19)

## 2024-10-03 ENCOUNTER — OFFICE VISIT (OUTPATIENT)
Dept: NEUROLOGY | Facility: MEDICAL CENTER | Age: 34
End: 2024-10-03
Attending: SPECIALIST
Payer: MEDICARE

## 2024-10-03 ENCOUNTER — HOSPITAL ENCOUNTER (OUTPATIENT)
Dept: LAB | Facility: MEDICAL CENTER | Age: 34
End: 2024-10-03
Attending: SPECIALIST
Payer: MEDICARE

## 2024-10-03 ENCOUNTER — HOSPITAL ENCOUNTER (OUTPATIENT)
Facility: MEDICAL CENTER | Age: 34
End: 2024-10-05
Attending: EMERGENCY MEDICINE | Admitting: FAMILY MEDICINE
Payer: MEDICARE

## 2024-10-03 VITALS
RESPIRATION RATE: 16 BRPM | TEMPERATURE: 97.7 F | HEIGHT: 64 IN | HEART RATE: 82 BPM | DIASTOLIC BLOOD PRESSURE: 78 MMHG | WEIGHT: 263.89 LBS | BODY MASS INDEX: 45.05 KG/M2 | SYSTOLIC BLOOD PRESSURE: 122 MMHG | OXYGEN SATURATION: 96 %

## 2024-10-03 DIAGNOSIS — R74.01 TRANSAMINITIS: ICD-10-CM

## 2024-10-03 DIAGNOSIS — Z86.19 HISTORY OF CLOSTRIDIOIDES DIFFICILE INFECTION: ICD-10-CM

## 2024-10-03 DIAGNOSIS — R53.1 WEAKNESS: ICD-10-CM

## 2024-10-03 DIAGNOSIS — R19.7 NAUSEA VOMITING AND DIARRHEA: ICD-10-CM

## 2024-10-03 DIAGNOSIS — R11.2 NAUSEA VOMITING AND DIARRHEA: ICD-10-CM

## 2024-10-03 DIAGNOSIS — R27.0 ATAXIA: ICD-10-CM

## 2024-10-03 DIAGNOSIS — R19.7 DIARRHEA, UNSPECIFIED TYPE: ICD-10-CM

## 2024-10-03 PROBLEM — R10.9 ABDOMINAL PAIN: Status: ACTIVE | Noted: 2024-10-03

## 2024-10-03 PROBLEM — N30.00 ACUTE CYSTITIS: Status: ACTIVE | Noted: 2024-10-03

## 2024-10-03 LAB
ALBUMIN SERPL BCP-MCNC: 4.6 G/DL (ref 3.2–4.9)
ALBUMIN/GLOB SERPL: 2.2 G/DL
ALP SERPL-CCNC: 113 U/L (ref 30–99)
ALT SERPL-CCNC: 212 U/L (ref 2–50)
ANION GAP SERPL CALC-SCNC: 12 MMOL/L (ref 7–16)
AST SERPL-CCNC: 92 U/L (ref 12–45)
BASOPHILS # BLD AUTO: 1.1 % (ref 0–1.8)
BASOPHILS # BLD: 0.06 K/UL (ref 0–0.12)
BILIRUB SERPL-MCNC: 0.5 MG/DL (ref 0.1–1.5)
BUN SERPL-MCNC: 9 MG/DL (ref 8–22)
CALCIUM ALBUM COR SERPL-MCNC: 9.1 MG/DL (ref 8.5–10.5)
CALCIUM SERPL-MCNC: 9.6 MG/DL (ref 8.5–10.5)
CHLORIDE SERPL-SCNC: 110 MMOL/L (ref 96–112)
CK SERPL-CCNC: 62 U/L (ref 0–154)
CO2 SERPL-SCNC: 17 MMOL/L (ref 20–33)
CREAT SERPL-MCNC: 0.82 MG/DL (ref 0.5–1.4)
EOSINOPHIL # BLD AUTO: 0.26 K/UL (ref 0–0.51)
EOSINOPHIL NFR BLD: 4.8 % (ref 0–6.9)
ERYTHROCYTE [DISTWIDTH] IN BLOOD BY AUTOMATED COUNT: 42.2 FL (ref 35.9–50)
FOLATE SERPL-MCNC: 10.2 NG/ML
GFR SERPLBLD CREATININE-BSD FMLA CKD-EPI: 96 ML/MIN/1.73 M 2
GLOBULIN SER CALC-MCNC: 2.1 G/DL (ref 1.9–3.5)
GLUCOSE SERPL-MCNC: 121 MG/DL (ref 65–99)
HAV IGM SERPL QL IA: NORMAL
HBV CORE IGM SER QL: NORMAL
HBV SURFACE AG SER QL: NORMAL
HCG SERPL QL: NEGATIVE
HCT VFR BLD AUTO: 41.6 % (ref 37–47)
HCV AB SER QL: NORMAL
HGB BLD-MCNC: 14.6 G/DL (ref 12–16)
IMM GRANULOCYTES # BLD AUTO: 0.02 K/UL (ref 0–0.11)
IMM GRANULOCYTES NFR BLD AUTO: 0.4 % (ref 0–0.9)
LIPASE SERPL-CCNC: 64 U/L (ref 11–82)
LYMPHOCYTES # BLD AUTO: 1.95 K/UL (ref 1–4.8)
LYMPHOCYTES NFR BLD: 35.8 % (ref 22–41)
MCH RBC QN AUTO: 32.2 PG (ref 27–33)
MCHC RBC AUTO-ENTMCNC: 35.1 G/DL (ref 32.2–35.5)
MCV RBC AUTO: 91.8 FL (ref 81.4–97.8)
MONOCYTES # BLD AUTO: 0.51 K/UL (ref 0–0.85)
MONOCYTES NFR BLD AUTO: 9.4 % (ref 0–13.4)
NEUTROPHILS # BLD AUTO: 2.64 K/UL (ref 1.82–7.42)
NEUTROPHILS NFR BLD: 48.5 % (ref 44–72)
NRBC # BLD AUTO: 0 K/UL
NRBC BLD-RTO: 0 /100 WBC (ref 0–0.2)
PLATELET # BLD AUTO: 208 K/UL (ref 164–446)
PMV BLD AUTO: 11.1 FL (ref 9–12.9)
POTASSIUM SERPL-SCNC: 4.3 MMOL/L (ref 3.6–5.5)
PROT SERPL-MCNC: 6.7 G/DL (ref 6–8.2)
RBC # BLD AUTO: 4.53 M/UL (ref 4.2–5.4)
SODIUM SERPL-SCNC: 139 MMOL/L (ref 135–145)
VIT B12 SERPL-MCNC: 530 PG/ML (ref 211–911)
WBC # BLD AUTO: 5.4 K/UL (ref 4.8–10.8)

## 2024-10-03 PROCEDURE — 82746 ASSAY OF FOLIC ACID SERUM: CPT

## 2024-10-03 PROCEDURE — 99221 1ST HOSP IP/OBS SF/LOW 40: CPT | Mod: GC | Performed by: FAMILY MEDICINE

## 2024-10-03 PROCEDURE — 3074F SYST BP LT 130 MM HG: CPT | Performed by: SPECIALIST

## 2024-10-03 PROCEDURE — 99285 EMERGENCY DEPT VISIT HI MDM: CPT | Mod: 27

## 2024-10-03 PROCEDURE — 83690 ASSAY OF LIPASE: CPT

## 2024-10-03 PROCEDURE — 84703 CHORIONIC GONADOTROPIN ASSAY: CPT

## 2024-10-03 PROCEDURE — 770001 HCHG ROOM/CARE - MED/SURG/GYN PRIV*

## 2024-10-03 PROCEDURE — 36415 COLL VENOUS BLD VENIPUNCTURE: CPT

## 2024-10-03 PROCEDURE — 80053 COMPREHEN METABOLIC PANEL: CPT

## 2024-10-03 PROCEDURE — 3078F DIAST BP <80 MM HG: CPT | Performed by: SPECIALIST

## 2024-10-03 PROCEDURE — 700111 HCHG RX REV CODE 636 W/ 250 OVERRIDE (IP): Mod: JZ | Performed by: BEHAVIOR ANALYST

## 2024-10-03 PROCEDURE — 80074 ACUTE HEPATITIS PANEL: CPT

## 2024-10-03 PROCEDURE — 99215 OFFICE O/P EST HI 40 MIN: CPT | Performed by: SPECIALIST

## 2024-10-03 PROCEDURE — 700102 HCHG RX REV CODE 250 W/ 637 OVERRIDE(OP): Performed by: BEHAVIOR ANALYST

## 2024-10-03 PROCEDURE — A9270 NON-COVERED ITEM OR SERVICE: HCPCS | Performed by: BEHAVIOR ANALYST

## 2024-10-03 PROCEDURE — 99212 OFFICE O/P EST SF 10 MIN: CPT | Performed by: SPECIALIST

## 2024-10-03 PROCEDURE — 82607 VITAMIN B-12: CPT

## 2024-10-03 PROCEDURE — 700105 HCHG RX REV CODE 258: Performed by: BEHAVIOR ANALYST

## 2024-10-03 PROCEDURE — 85025 COMPLETE CBC W/AUTO DIFF WBC: CPT

## 2024-10-03 PROCEDURE — 82550 ASSAY OF CK (CPK): CPT

## 2024-10-03 RX ORDER — ACETAMINOPHEN 500 MG
1000 TABLET ORAL EVERY 6 HOURS PRN
Status: ON HOLD | COMMUNITY
End: 2024-10-05

## 2024-10-03 RX ORDER — SPIRONOLACTONE 25 MG/1
25 TABLET ORAL DAILY
Status: DISCONTINUED | OUTPATIENT
Start: 2024-10-04 | End: 2024-10-05 | Stop reason: HOSPADM

## 2024-10-03 RX ORDER — IVABRADINE 7.5 MG/1
7.5 TABLET, FILM COATED ORAL 2 TIMES DAILY WITH MEALS
Status: DISCONTINUED | OUTPATIENT
Start: 2024-10-04 | End: 2024-10-03

## 2024-10-03 RX ORDER — TOPIRAMATE 25 MG/1
50 TABLET, FILM COATED ORAL 2 TIMES DAILY
Status: DISCONTINUED | OUTPATIENT
Start: 2024-10-03 | End: 2024-10-05 | Stop reason: HOSPADM

## 2024-10-03 RX ORDER — SODIUM CHLORIDE, SODIUM LACTATE, POTASSIUM CHLORIDE, CALCIUM CHLORIDE 600; 310; 30; 20 MG/100ML; MG/100ML; MG/100ML; MG/100ML
INJECTION, SOLUTION INTRAVENOUS CONTINUOUS
Status: DISCONTINUED | OUTPATIENT
Start: 2024-10-03 | End: 2024-10-05 | Stop reason: HOSPADM

## 2024-10-03 RX ORDER — DIPHENHYDRAMINE HCL 25 MG
50 TABLET ORAL
Status: DISCONTINUED | OUTPATIENT
Start: 2024-10-03 | End: 2024-10-05 | Stop reason: HOSPADM

## 2024-10-03 RX ORDER — NITROFURANTOIN 25; 75 MG/1; MG/1
100 CAPSULE ORAL 2 TIMES DAILY
Status: COMPLETED | OUTPATIENT
Start: 2024-10-03 | End: 2024-10-04

## 2024-10-03 RX ORDER — AMLODIPINE BESYLATE 5 MG/1
5 TABLET ORAL DAILY
Status: DISCONTINUED | OUTPATIENT
Start: 2024-10-04 | End: 2024-10-05 | Stop reason: HOSPADM

## 2024-10-03 RX ORDER — METOPROLOL SUCCINATE 25 MG/1
25 TABLET, EXTENDED RELEASE ORAL 2 TIMES DAILY
Status: DISCONTINUED | OUTPATIENT
Start: 2024-10-03 | End: 2024-10-05 | Stop reason: HOSPADM

## 2024-10-03 RX ORDER — ENOXAPARIN SODIUM 100 MG/ML
40 INJECTION SUBCUTANEOUS DAILY
Status: DISCONTINUED | OUTPATIENT
Start: 2024-10-03 | End: 2024-10-05 | Stop reason: HOSPADM

## 2024-10-03 RX ORDER — LAMOTRIGINE 100 MG/1
50 TABLET ORAL 2 TIMES DAILY
Status: DISCONTINUED | OUTPATIENT
Start: 2024-10-03 | End: 2024-10-05 | Stop reason: HOSPADM

## 2024-10-03 RX ORDER — GABAPENTIN 400 MG/1
1200 CAPSULE ORAL 3 TIMES DAILY
Status: DISCONTINUED | OUTPATIENT
Start: 2024-10-03 | End: 2024-10-05 | Stop reason: HOSPADM

## 2024-10-03 RX ORDER — DIPHENHYDRAMINE HCL 25 MG
25-50 TABLET ORAL 2 TIMES DAILY
Status: DISCONTINUED | OUTPATIENT
Start: 2024-10-03 | End: 2024-10-03

## 2024-10-03 RX ORDER — ZIPRASIDONE HYDROCHLORIDE 40 MG/1
40 CAPSULE ORAL
Status: DISCONTINUED | OUTPATIENT
Start: 2024-10-03 | End: 2024-10-05 | Stop reason: HOSPADM

## 2024-10-03 RX ORDER — OXYCODONE HYDROCHLORIDE 5 MG/1
5 TABLET ORAL ONCE
Status: COMPLETED | OUTPATIENT
Start: 2024-10-03 | End: 2024-10-03

## 2024-10-03 RX ORDER — DIPHENHYDRAMINE HCL 25 MG
25 TABLET ORAL EVERY MORNING
Status: DISCONTINUED | OUTPATIENT
Start: 2024-10-04 | End: 2024-10-05 | Stop reason: HOSPADM

## 2024-10-03 RX ORDER — SUMATRIPTAN 20 MG/1
1 SPRAY NASAL PRN
Status: DISCONTINUED | OUTPATIENT
Start: 2024-10-03 | End: 2024-10-03

## 2024-10-03 RX ADMIN — ENOXAPARIN SODIUM 40 MG: 100 INJECTION SUBCUTANEOUS at 21:58

## 2024-10-03 RX ADMIN — OXYCODONE HYDROCHLORIDE 5 MG: 5 TABLET ORAL at 21:56

## 2024-10-03 RX ADMIN — SODIUM CHLORIDE, POTASSIUM CHLORIDE, SODIUM LACTATE AND CALCIUM CHLORIDE: 600; 310; 30; 20 INJECTION, SOLUTION INTRAVENOUS at 22:11

## 2024-10-03 ASSESSMENT — PATIENT HEALTH QUESTIONNAIRE - PHQ9
SUM OF ALL RESPONSES TO PHQ9 QUESTIONS 1 AND 2: 0
2. FEELING DOWN, DEPRESSED, IRRITABLE, OR HOPELESS: NOT AT ALL
CLINICAL INTERPRETATION OF PHQ2 SCORE: 0
5. POOR APPETITE OR OVEREATING: 0 - NOT AT ALL
1. LITTLE INTEREST OR PLEASURE IN DOING THINGS: NOT AT ALL

## 2024-10-03 ASSESSMENT — LIFESTYLE VARIABLES
ALCOHOL_USE: NO
HOW MANY TIMES IN THE PAST YEAR HAVE YOU HAD 5 OR MORE DRINKS IN A DAY: 0
EVER HAD A DRINK FIRST THING IN THE MORNING TO STEADY YOUR NERVES TO GET RID OF A HANGOVER: NO
CONSUMPTION TOTAL: NEGATIVE
EVER FELT BAD OR GUILTY ABOUT YOUR DRINKING: NO
HAVE YOU EVER FELT YOU SHOULD CUT DOWN ON YOUR DRINKING: NO
DOES PATIENT WANT TO STOP DRINKING: NO
ON A TYPICAL DAY WHEN YOU DRINK ALCOHOL HOW MANY DRINKS DO YOU HAVE: 0
TOTAL SCORE: 0
TOTAL SCORE: 0
HAVE PEOPLE ANNOYED YOU BY CRITICIZING YOUR DRINKING: NO
AVERAGE NUMBER OF DAYS PER WEEK YOU HAVE A DRINK CONTAINING ALCOHOL: 0
TOTAL SCORE: 0

## 2024-10-03 ASSESSMENT — COGNITIVE AND FUNCTIONAL STATUS - GENERAL
DAILY ACTIVITIY SCORE: 24
SUGGESTED CMS G CODE MODIFIER DAILY ACTIVITY: CH
SUGGESTED CMS G CODE MODIFIER MOBILITY: CH
MOBILITY SCORE: 24

## 2024-10-03 ASSESSMENT — SOCIAL DETERMINANTS OF HEALTH (SDOH)
WITHIN THE PAST 12 MONTHS, YOU WORRIED THAT YOUR FOOD WOULD RUN OUT BEFORE YOU GOT THE MONEY TO BUY MORE: NEVER TRUE
WITHIN THE LAST YEAR, HAVE YOU BEEN KICKED, HIT, SLAPPED, OR OTHERWISE PHYSICALLY HURT BY YOUR PARTNER OR EX-PARTNER?: NO
IN THE PAST 12 MONTHS, HAS THE ELECTRIC, GAS, OIL, OR WATER COMPANY THREATENED TO SHUT OFF SERVICE IN YOUR HOME?: NO
WITHIN THE LAST YEAR, HAVE TO BEEN RAPED OR FORCED TO HAVE ANY KIND OF SEXUAL ACTIVITY BY YOUR PARTNER OR EX-PARTNER?: NO
WITHIN THE LAST YEAR, HAVE YOU BEEN HUMILIATED OR EMOTIONALLY ABUSED IN OTHER WAYS BY YOUR PARTNER OR EX-PARTNER?: NO
WITHIN THE LAST YEAR, HAVE YOU BEEN AFRAID OF YOUR PARTNER OR EX-PARTNER?: NO
WITHIN THE PAST 12 MONTHS, THE FOOD YOU BOUGHT JUST DIDN'T LAST AND YOU DIDN'T HAVE MONEY TO GET MORE: NEVER TRUE

## 2024-10-03 ASSESSMENT — FIBROSIS 4 INDEX
FIB4 SCORE: 1.03
FIB4 SCORE: 0.67
FIB4 SCORE: 0.67

## 2024-10-03 ASSESSMENT — PAIN DESCRIPTION - PAIN TYPE: TYPE: ACUTE PAIN

## 2024-10-04 LAB
ALBUMIN SERPL BCP-MCNC: 3.9 G/DL (ref 3.2–4.9)
ALBUMIN/GLOB SERPL: 2 G/DL
ALP SERPL-CCNC: 90 U/L (ref 30–99)
ALT SERPL-CCNC: 143 U/L (ref 2–50)
ANION GAP SERPL CALC-SCNC: 14 MMOL/L (ref 7–16)
ANISOCYTOSIS BLD QL SMEAR: ABNORMAL
APPEARANCE UR: CLEAR
AST SERPL-CCNC: 50 U/L (ref 12–45)
BACTERIA #/AREA URNS HPF: NEGATIVE /HPF
BASOPHILS # BLD AUTO: 3.5 % (ref 0–1.8)
BASOPHILS # BLD: 0.15 K/UL (ref 0–0.12)
BILIRUB SERPL-MCNC: 0.5 MG/DL (ref 0.1–1.5)
BILIRUB UR QL STRIP.AUTO: NEGATIVE
BUN SERPL-MCNC: 8 MG/DL (ref 8–22)
CALCIUM ALBUM COR SERPL-MCNC: 9.1 MG/DL (ref 8.5–10.5)
CALCIUM SERPL-MCNC: 9 MG/DL (ref 8.5–10.5)
CHLORIDE SERPL-SCNC: 109 MMOL/L (ref 96–112)
CO2 SERPL-SCNC: 18 MMOL/L (ref 20–33)
COLOR UR: YELLOW
CREAT SERPL-MCNC: 0.6 MG/DL (ref 0.5–1.4)
EKG IMPRESSION: NORMAL
EOSINOPHIL # BLD AUTO: 0.08 K/UL (ref 0–0.51)
EOSINOPHIL NFR BLD: 1.8 % (ref 0–6.9)
EPI CELLS #/AREA URNS HPF: ABNORMAL /HPF
ERYTHROCYTE [DISTWIDTH] IN BLOOD BY AUTOMATED COUNT: 42.4 FL (ref 35.9–50)
GFR SERPLBLD CREATININE-BSD FMLA CKD-EPI: 120 ML/MIN/1.73 M 2
GLOBULIN SER CALC-MCNC: 2 G/DL (ref 1.9–3.5)
GLUCOSE SERPL-MCNC: 107 MG/DL (ref 65–99)
GLUCOSE UR STRIP.AUTO-MCNC: NEGATIVE MG/DL
HCT VFR BLD AUTO: 36.8 % (ref 37–47)
HGB BLD-MCNC: 12.8 G/DL (ref 12–16)
HYALINE CASTS #/AREA URNS LPF: ABNORMAL /LPF
KETONES UR STRIP.AUTO-MCNC: NEGATIVE MG/DL
LEUKOCYTE ESTERASE UR QL STRIP.AUTO: ABNORMAL
LYMPHOCYTES # BLD AUTO: 1.13 K/UL (ref 1–4.8)
LYMPHOCYTES NFR BLD: 25.7 % (ref 22–41)
MANUAL DIFF BLD: NORMAL
MCH RBC QN AUTO: 32.1 PG (ref 27–33)
MCHC RBC AUTO-ENTMCNC: 34.8 G/DL (ref 32.2–35.5)
MCV RBC AUTO: 92.2 FL (ref 81.4–97.8)
MICRO URNS: ABNORMAL
MICROCYTES BLD QL SMEAR: ABNORMAL
MONOCYTES # BLD AUTO: 0.15 K/UL (ref 0–0.85)
MONOCYTES NFR BLD AUTO: 3.5 % (ref 0–13.4)
MORPHOLOGY BLD-IMP: NORMAL
NEUTROPHILS # BLD AUTO: 2.88 K/UL (ref 1.82–7.42)
NEUTROPHILS NFR BLD: 65.5 % (ref 44–72)
NITRITE UR QL STRIP.AUTO: NEGATIVE
NRBC # BLD AUTO: 0 K/UL
NRBC BLD-RTO: 0 /100 WBC (ref 0–0.2)
PH UR STRIP.AUTO: 6 [PH] (ref 5–8)
PLATELET # BLD AUTO: 158 K/UL (ref 164–446)
PLATELET BLD QL SMEAR: NORMAL
PMV BLD AUTO: 11.2 FL (ref 9–12.9)
POTASSIUM SERPL-SCNC: 3.6 MMOL/L (ref 3.6–5.5)
PROT SERPL-MCNC: 5.9 G/DL (ref 6–8.2)
PROT UR QL STRIP: NEGATIVE MG/DL
RBC # BLD AUTO: 3.99 M/UL (ref 4.2–5.4)
RBC # URNS HPF: ABNORMAL /HPF
RBC BLD AUTO: PRESENT
RBC UR QL AUTO: NEGATIVE
SODIUM SERPL-SCNC: 141 MMOL/L (ref 135–145)
SP GR UR STRIP.AUTO: 1.02
TRANS CELLS #/AREA URNS HPF: ABNORMAL /HPF
UROBILINOGEN UR STRIP.AUTO-MCNC: 0.2 MG/DL
WBC # BLD AUTO: 4.4 K/UL (ref 4.8–10.8)
WBC #/AREA URNS HPF: ABNORMAL /HPF

## 2024-10-04 PROCEDURE — 700111 HCHG RX REV CODE 636 W/ 250 OVERRIDE (IP): Mod: JZ,UD | Performed by: BEHAVIOR ANALYST

## 2024-10-04 PROCEDURE — 93010 ELECTROCARDIOGRAM REPORT: CPT | Performed by: INTERNAL MEDICINE

## 2024-10-04 PROCEDURE — 93005 ELECTROCARDIOGRAM TRACING: CPT

## 2024-10-04 PROCEDURE — 80053 COMPREHEN METABOLIC PANEL: CPT

## 2024-10-04 PROCEDURE — A9270 NON-COVERED ITEM OR SERVICE: HCPCS

## 2024-10-04 PROCEDURE — 700102 HCHG RX REV CODE 250 W/ 637 OVERRIDE(OP)

## 2024-10-04 PROCEDURE — 85027 COMPLETE CBC AUTOMATED: CPT

## 2024-10-04 PROCEDURE — 85007 BL SMEAR W/DIFF WBC COUNT: CPT

## 2024-10-04 PROCEDURE — 700102 HCHG RX REV CODE 250 W/ 637 OVERRIDE(OP): Performed by: BEHAVIOR ANALYST

## 2024-10-04 PROCEDURE — G0378 HOSPITAL OBSERVATION PER HR: HCPCS

## 2024-10-04 PROCEDURE — 81001 URINALYSIS AUTO W/SCOPE: CPT

## 2024-10-04 PROCEDURE — A9270 NON-COVERED ITEM OR SERVICE: HCPCS | Performed by: BEHAVIOR ANALYST

## 2024-10-04 PROCEDURE — 700105 HCHG RX REV CODE 258: Performed by: BEHAVIOR ANALYST

## 2024-10-04 RX ORDER — ONDANSETRON 2 MG/ML
4 INJECTION INTRAMUSCULAR; INTRAVENOUS ONCE
Status: COMPLETED | OUTPATIENT
Start: 2024-10-04 | End: 2024-10-04

## 2024-10-04 RX ORDER — SIMETHICONE 125 MG
125 TABLET,CHEWABLE ORAL 3 TIMES DAILY PRN
Status: DISCONTINUED | OUTPATIENT
Start: 2024-10-04 | End: 2024-10-05 | Stop reason: HOSPADM

## 2024-10-04 RX ADMIN — LAMOTRIGINE 50 MG: 100 TABLET ORAL at 00:49

## 2024-10-04 RX ADMIN — ENOXAPARIN SODIUM 40 MG: 100 INJECTION SUBCUTANEOUS at 18:44

## 2024-10-04 RX ADMIN — METOPROLOL SUCCINATE 25 MG: 25 TABLET, EXTENDED RELEASE ORAL at 05:58

## 2024-10-04 RX ADMIN — NITROFURANTOIN MONOHYDRATE/MACROCRYSTALS 100 MG: 75; 25 CAPSULE ORAL at 00:48

## 2024-10-04 RX ADMIN — TOPIRAMATE 50 MG: 25 TABLET, FILM COATED ORAL at 00:48

## 2024-10-04 RX ADMIN — METOPROLOL SUCCINATE 25 MG: 25 TABLET, EXTENDED RELEASE ORAL at 00:48

## 2024-10-04 RX ADMIN — SPIRONOLACTONE 25 MG: 25 TABLET ORAL at 05:58

## 2024-10-04 RX ADMIN — OMEPRAZOLE 20 MG: 20 CAPSULE, DELAYED RELEASE ORAL at 00:49

## 2024-10-04 RX ADMIN — GABAPENTIN 1200 MG: 400 CAPSULE ORAL at 16:08

## 2024-10-04 RX ADMIN — Medication 10 MG: at 21:46

## 2024-10-04 RX ADMIN — ZIPRASIDONE HYDROCHLORIDE 40 MG: 40 CAPSULE ORAL at 00:48

## 2024-10-04 RX ADMIN — GABAPENTIN 1200 MG: 400 CAPSULE ORAL at 21:46

## 2024-10-04 RX ADMIN — SODIUM CHLORIDE, POTASSIUM CHLORIDE, SODIUM LACTATE AND CALCIUM CHLORIDE: 600; 310; 30; 20 INJECTION, SOLUTION INTRAVENOUS at 08:49

## 2024-10-04 RX ADMIN — ONDANSETRON 4 MG: 2 INJECTION INTRAMUSCULAR; INTRAVENOUS at 00:22

## 2024-10-04 RX ADMIN — AMLODIPINE BESYLATE 5 MG: 5 TABLET ORAL at 05:58

## 2024-10-04 RX ADMIN — LIDOCAINE HYDROCHLORIDE 30 ML: 20 SOLUTION ORAL; TOPICAL at 22:32

## 2024-10-04 RX ADMIN — GABAPENTIN 1200 MG: 400 CAPSULE ORAL at 00:48

## 2024-10-04 RX ADMIN — Medication 10 MG: at 00:48

## 2024-10-04 RX ADMIN — DIPHENHYDRAMINE HYDROCHLORIDE 50 MG: 25 TABLET ORAL at 21:47

## 2024-10-04 RX ADMIN — METOPROLOL SUCCINATE 25 MG: 25 TABLET, EXTENDED RELEASE ORAL at 18:44

## 2024-10-04 RX ADMIN — LAMOTRIGINE 50 MG: 100 TABLET ORAL at 18:42

## 2024-10-04 RX ADMIN — OMEPRAZOLE 20 MG: 20 CAPSULE, DELAYED RELEASE ORAL at 18:43

## 2024-10-04 RX ADMIN — DIPHENHYDRAMINE HYDROCHLORIDE 50 MG: 25 TABLET ORAL at 00:49

## 2024-10-04 RX ADMIN — OMEPRAZOLE 20 MG: 20 CAPSULE, DELAYED RELEASE ORAL at 05:58

## 2024-10-04 RX ADMIN — LAMOTRIGINE 50 MG: 100 TABLET ORAL at 05:58

## 2024-10-04 RX ADMIN — ZIPRASIDONE HYDROCHLORIDE 40 MG: 40 CAPSULE ORAL at 21:46

## 2024-10-04 RX ADMIN — DIPHENHYDRAMINE HYDROCHLORIDE 25 MG: 25 TABLET ORAL at 05:58

## 2024-10-04 RX ADMIN — TOPIRAMATE 50 MG: 25 TABLET, FILM COATED ORAL at 18:43

## 2024-10-04 RX ADMIN — TOPIRAMATE 50 MG: 25 TABLET, FILM COATED ORAL at 05:58

## 2024-10-04 RX ADMIN — GABAPENTIN 1200 MG: 400 CAPSULE ORAL at 08:49

## 2024-10-04 ASSESSMENT — PAIN DESCRIPTION - PAIN TYPE
TYPE: ACUTE PAIN

## 2024-10-05 ENCOUNTER — PHARMACY VISIT (OUTPATIENT)
Dept: PHARMACY | Facility: MEDICAL CENTER | Age: 34
End: 2024-10-05
Payer: COMMERCIAL

## 2024-10-05 VITALS
SYSTOLIC BLOOD PRESSURE: 109 MMHG | BODY MASS INDEX: 44.53 KG/M2 | WEIGHT: 260.8 LBS | DIASTOLIC BLOOD PRESSURE: 52 MMHG | OXYGEN SATURATION: 96 % | RESPIRATION RATE: 17 BRPM | TEMPERATURE: 97.5 F | HEART RATE: 73 BPM | HEIGHT: 64 IN

## 2024-10-05 PROBLEM — N30.00 ACUTE CYSTITIS: Status: RESOLVED | Noted: 2024-10-03 | Resolved: 2024-10-05

## 2024-10-05 LAB
ALBUMIN SERPL BCP-MCNC: 3.9 G/DL (ref 3.2–4.9)
ALBUMIN/GLOB SERPL: 2.1 G/DL
ALP SERPL-CCNC: 89 U/L (ref 30–99)
ALT SERPL-CCNC: 112 U/L (ref 2–50)
ANION GAP SERPL CALC-SCNC: 14 MMOL/L (ref 7–16)
ANISOCYTOSIS BLD QL SMEAR: ABNORMAL
AST SERPL-CCNC: 34 U/L (ref 12–45)
BASOPHILS # BLD AUTO: 1.7 % (ref 0–1.8)
BASOPHILS # BLD: 0.06 K/UL (ref 0–0.12)
BILIRUB SERPL-MCNC: 0.5 MG/DL (ref 0.1–1.5)
BUN SERPL-MCNC: 6 MG/DL (ref 8–22)
CALCIUM ALBUM COR SERPL-MCNC: 9.4 MG/DL (ref 8.5–10.5)
CALCIUM SERPL-MCNC: 9.3 MG/DL (ref 8.5–10.5)
CHLORIDE SERPL-SCNC: 110 MMOL/L (ref 96–112)
CO2 SERPL-SCNC: 18 MMOL/L (ref 20–33)
CREAT SERPL-MCNC: 0.67 MG/DL (ref 0.5–1.4)
EOSINOPHIL # BLD AUTO: 0.12 K/UL (ref 0–0.51)
EOSINOPHIL NFR BLD: 3.5 % (ref 0–6.9)
ERYTHROCYTE [DISTWIDTH] IN BLOOD BY AUTOMATED COUNT: 43 FL (ref 35.9–50)
GFR SERPLBLD CREATININE-BSD FMLA CKD-EPI: 117 ML/MIN/1.73 M 2
GLOBULIN SER CALC-MCNC: 1.9 G/DL (ref 1.9–3.5)
GLUCOSE SERPL-MCNC: 128 MG/DL (ref 65–99)
HCT VFR BLD AUTO: 37 % (ref 37–47)
HGB BLD-MCNC: 12.8 G/DL (ref 12–16)
LYMPHOCYTES # BLD AUTO: 1.45 K/UL (ref 1–4.8)
LYMPHOCYTES NFR BLD: 42.6 % (ref 22–41)
MANUAL DIFF BLD: NORMAL
MCH RBC QN AUTO: 32.2 PG (ref 27–33)
MCHC RBC AUTO-ENTMCNC: 34.6 G/DL (ref 32.2–35.5)
MCV RBC AUTO: 93.2 FL (ref 81.4–97.8)
MICROCYTES BLD QL SMEAR: ABNORMAL
MONOCYTES # BLD AUTO: 0.27 K/UL (ref 0–0.85)
MONOCYTES NFR BLD AUTO: 7.8 % (ref 0–13.4)
MORPHOLOGY BLD-IMP: NORMAL
NEUTROPHILS # BLD AUTO: 1.51 K/UL (ref 1.82–7.42)
NEUTROPHILS NFR BLD: 44.4 % (ref 44–72)
NRBC # BLD AUTO: 0 K/UL
NRBC BLD-RTO: 0 /100 WBC (ref 0–0.2)
PLATELET # BLD AUTO: 144 K/UL (ref 164–446)
PLATELET BLD QL SMEAR: NORMAL
PMV BLD AUTO: 11.1 FL (ref 9–12.9)
POTASSIUM SERPL-SCNC: 3.6 MMOL/L (ref 3.6–5.5)
PROT SERPL-MCNC: 5.8 G/DL (ref 6–8.2)
RBC # BLD AUTO: 3.97 M/UL (ref 4.2–5.4)
RBC BLD AUTO: PRESENT
SODIUM SERPL-SCNC: 142 MMOL/L (ref 135–145)
WBC # BLD AUTO: 3.4 K/UL (ref 4.8–10.8)

## 2024-10-05 PROCEDURE — 99238 HOSP IP/OBS DSCHRG MGMT 30/<: CPT | Mod: GC | Performed by: HOSPITALIST

## 2024-10-05 PROCEDURE — RXMED WILLOW AMBULATORY MEDICATION CHARGE

## 2024-10-05 PROCEDURE — 700102 HCHG RX REV CODE 250 W/ 637 OVERRIDE(OP): Mod: UD | Performed by: BEHAVIOR ANALYST

## 2024-10-05 PROCEDURE — 80053 COMPREHEN METABOLIC PANEL: CPT

## 2024-10-05 PROCEDURE — 85027 COMPLETE CBC AUTOMATED: CPT

## 2024-10-05 PROCEDURE — A9270 NON-COVERED ITEM OR SERVICE: HCPCS | Mod: UD | Performed by: BEHAVIOR ANALYST

## 2024-10-05 PROCEDURE — 700105 HCHG RX REV CODE 258: Mod: UD | Performed by: BEHAVIOR ANALYST

## 2024-10-05 PROCEDURE — G0378 HOSPITAL OBSERVATION PER HR: HCPCS

## 2024-10-05 PROCEDURE — 85007 BL SMEAR W/DIFF WBC COUNT: CPT

## 2024-10-05 RX ORDER — SCOLOPAMINE TRANSDERMAL SYSTEM 1 MG/1
1 PATCH, EXTENDED RELEASE TRANSDERMAL
Qty: 3 PATCH | Refills: 0 | Status: SHIPPED | OUTPATIENT
Start: 2024-10-05 | End: 2024-10-08 | Stop reason: SDUPTHER

## 2024-10-05 RX ADMIN — AMLODIPINE BESYLATE 5 MG: 5 TABLET ORAL at 04:35

## 2024-10-05 RX ADMIN — METOPROLOL SUCCINATE 25 MG: 25 TABLET, EXTENDED RELEASE ORAL at 04:35

## 2024-10-05 RX ADMIN — GABAPENTIN 1200 MG: 400 CAPSULE ORAL at 15:05

## 2024-10-05 RX ADMIN — GABAPENTIN 1200 MG: 400 CAPSULE ORAL at 09:50

## 2024-10-05 RX ADMIN — LAMOTRIGINE 50 MG: 100 TABLET ORAL at 04:36

## 2024-10-05 RX ADMIN — TOPIRAMATE 50 MG: 25 TABLET, FILM COATED ORAL at 04:35

## 2024-10-05 RX ADMIN — DIPHENHYDRAMINE HYDROCHLORIDE 25 MG: 25 TABLET ORAL at 04:35

## 2024-10-05 RX ADMIN — SPIRONOLACTONE 25 MG: 25 TABLET ORAL at 04:36

## 2024-10-05 RX ADMIN — OMEPRAZOLE 20 MG: 20 CAPSULE, DELAYED RELEASE ORAL at 04:36

## 2024-10-05 RX ADMIN — SODIUM CHLORIDE, POTASSIUM CHLORIDE, SODIUM LACTATE AND CALCIUM CHLORIDE: 600; 310; 30; 20 INJECTION, SOLUTION INTRAVENOUS at 04:47

## 2024-10-05 ASSESSMENT — PAIN DESCRIPTION - PAIN TYPE
TYPE: ACUTE PAIN
TYPE: ACUTE PAIN

## 2024-10-07 ENCOUNTER — OFFICE VISIT (OUTPATIENT)
Dept: URGENT CARE | Facility: CLINIC | Age: 34
End: 2024-10-07
Payer: MEDICARE

## 2024-10-07 ENCOUNTER — PATIENT OUTREACH (OUTPATIENT)
Dept: MEDICAL GROUP | Facility: MEDICAL CENTER | Age: 34
End: 2024-10-07

## 2024-10-07 ENCOUNTER — HOSPITAL ENCOUNTER (OUTPATIENT)
Dept: RADIOLOGY | Facility: MEDICAL CENTER | Age: 34
End: 2024-10-07
Attending: ORTHOPAEDIC SURGERY
Payer: MEDICARE

## 2024-10-07 ENCOUNTER — PHARMACY VISIT (OUTPATIENT)
Dept: PHARMACY | Facility: MEDICAL CENTER | Age: 34
End: 2024-10-07
Payer: COMMERCIAL

## 2024-10-07 VITALS
RESPIRATION RATE: 14 BRPM | BODY MASS INDEX: 44.76 KG/M2 | DIASTOLIC BLOOD PRESSURE: 82 MMHG | OXYGEN SATURATION: 98 % | HEART RATE: 74 BPM | TEMPERATURE: 97.6 F | WEIGHT: 262.2 LBS | HEIGHT: 64 IN | SYSTOLIC BLOOD PRESSURE: 130 MMHG

## 2024-10-07 DIAGNOSIS — J06.9 VIRAL URI WITH COUGH: ICD-10-CM

## 2024-10-07 DIAGNOSIS — J45.40 MODERATE PERSISTENT ASTHMA WITHOUT COMPLICATION: ICD-10-CM

## 2024-10-07 DIAGNOSIS — M25.551 RIGHT HIP PAIN: ICD-10-CM

## 2024-10-07 PROCEDURE — 3079F DIAST BP 80-89 MM HG: CPT | Performed by: NURSE PRACTITIONER

## 2024-10-07 PROCEDURE — RXMED WILLOW AMBULATORY MEDICATION CHARGE: Performed by: NURSE PRACTITIONER

## 2024-10-07 PROCEDURE — 3075F SYST BP GE 130 - 139MM HG: CPT | Performed by: NURSE PRACTITIONER

## 2024-10-07 PROCEDURE — 0241U POCT CEPHEID COV-2, FLU A/B, RSV - PCR: CPT | Performed by: NURSE PRACTITIONER

## 2024-10-07 PROCEDURE — 73721 MRI JNT OF LWR EXTRE W/O DYE: CPT

## 2024-10-07 PROCEDURE — 99213 OFFICE O/P EST LOW 20 MIN: CPT | Performed by: NURSE PRACTITIONER

## 2024-10-07 RX ORDER — IPRATROPIUM BROMIDE AND ALBUTEROL 20; 100 UG/1; UG/1
2 SPRAY, METERED RESPIRATORY (INHALATION) 4 TIMES DAILY PRN
Qty: 4 G | Refills: 1 | Status: SHIPPED | OUTPATIENT
Start: 2024-10-07

## 2024-10-07 RX ORDER — ALBUTEROL SULFATE 0.83 MG/ML
2.5 SOLUTION RESPIRATORY (INHALATION) EVERY 4 HOURS PRN
Qty: 75 ML | Refills: 0 | Status: SHIPPED | OUTPATIENT
Start: 2024-10-07

## 2024-10-07 ASSESSMENT — FIBROSIS 4 INDEX: FIB4 SCORE: 0.76

## 2024-10-08 ENCOUNTER — OFFICE VISIT (OUTPATIENT)
Dept: MEDICAL GROUP | Facility: MEDICAL CENTER | Age: 34
End: 2024-10-08
Payer: MEDICARE

## 2024-10-08 VITALS
SYSTOLIC BLOOD PRESSURE: 114 MMHG | TEMPERATURE: 97.1 F | HEIGHT: 65 IN | DIASTOLIC BLOOD PRESSURE: 66 MMHG | WEIGHT: 266.32 LBS | HEART RATE: 68 BPM | BODY MASS INDEX: 44.37 KG/M2 | OXYGEN SATURATION: 98 %

## 2024-10-08 DIAGNOSIS — M25.50 ARTHRALGIA, UNSPECIFIED JOINT: ICD-10-CM

## 2024-10-08 DIAGNOSIS — K21.9 GASTROESOPHAGEAL REFLUX DISEASE WITHOUT ESOPHAGITIS: Chronic | ICD-10-CM

## 2024-10-08 DIAGNOSIS — Q79.60 EHLERS-DANLOS SYNDROME: Chronic | ICD-10-CM

## 2024-10-08 DIAGNOSIS — R11.2 NAUSEA AND VOMITING, UNSPECIFIED VOMITING TYPE: ICD-10-CM

## 2024-10-08 PROCEDURE — 99214 OFFICE O/P EST MOD 30 MIN: CPT | Performed by: STUDENT IN AN ORGANIZED HEALTH CARE EDUCATION/TRAINING PROGRAM

## 2024-10-08 PROCEDURE — 3078F DIAST BP <80 MM HG: CPT | Performed by: STUDENT IN AN ORGANIZED HEALTH CARE EDUCATION/TRAINING PROGRAM

## 2024-10-08 PROCEDURE — 3074F SYST BP LT 130 MM HG: CPT | Performed by: STUDENT IN AN ORGANIZED HEALTH CARE EDUCATION/TRAINING PROGRAM

## 2024-10-08 RX ORDER — SCOLOPAMINE TRANSDERMAL SYSTEM 1 MG/1
1 PATCH, EXTENDED RELEASE TRANSDERMAL
Qty: 7 PATCH | Refills: 1 | Status: SHIPPED | OUTPATIENT
Start: 2024-10-08

## 2024-10-08 ASSESSMENT — ENCOUNTER SYMPTOMS
FEVER: 0
SHORTNESS OF BREATH: 0
CHILLS: 0
PALPITATIONS: 0
VOMITING: 0
WEIGHT LOSS: 0
NAUSEA: 0
HEADACHES: 0
WHEEZING: 0
DIZZINESS: 0

## 2024-10-08 ASSESSMENT — FIBROSIS 4 INDEX: FIB4 SCORE: 0.76

## 2024-10-10 ENCOUNTER — PHARMACY VISIT (OUTPATIENT)
Dept: PHARMACY | Facility: MEDICAL CENTER | Age: 34
End: 2024-10-10
Payer: COMMERCIAL

## 2024-10-10 ENCOUNTER — NON-PROVIDER VISIT (OUTPATIENT)
Dept: NEUROLOGY | Facility: MEDICAL CENTER | Age: 34
End: 2024-10-10
Attending: SPECIALIST
Payer: MEDICARE

## 2024-10-10 DIAGNOSIS — R53.1 WEAKNESS: ICD-10-CM

## 2024-10-10 PROCEDURE — RXMED WILLOW AMBULATORY MEDICATION CHARGE: Performed by: STUDENT IN AN ORGANIZED HEALTH CARE EDUCATION/TRAINING PROGRAM

## 2024-10-10 PROCEDURE — 95910 NRV CNDJ TEST 7-8 STUDIES: CPT | Mod: 26 | Performed by: SPECIALIST

## 2024-10-10 PROCEDURE — 95910 NRV CNDJ TEST 7-8 STUDIES: CPT | Performed by: SPECIALIST

## 2024-10-10 PROCEDURE — 95886 MUSC TEST DONE W/N TEST COMP: CPT | Mod: 26 | Performed by: SPECIALIST

## 2024-10-10 PROCEDURE — 95886 MUSC TEST DONE W/N TEST COMP: CPT | Performed by: SPECIALIST

## 2024-10-11 ENCOUNTER — PHYSICAL THERAPY (OUTPATIENT)
Dept: PHYSICAL THERAPY | Facility: REHABILITATION | Age: 34
End: 2024-10-11
Attending: SPECIALIST
Payer: MEDICARE

## 2024-10-11 DIAGNOSIS — R26.89 LOSS OF BALANCE: ICD-10-CM

## 2024-10-11 DIAGNOSIS — R27.0 ATAXIA: ICD-10-CM

## 2024-10-11 DIAGNOSIS — R53.1 WEAKNESS: ICD-10-CM

## 2024-10-11 PROCEDURE — 97162 PT EVAL MOD COMPLEX 30 MIN: CPT

## 2024-10-11 PROCEDURE — 97110 THERAPEUTIC EXERCISES: CPT

## 2024-10-11 ASSESSMENT — ACTIVITIES OF DAILY LIVING (ADL): POOR_BALANCE: 1

## 2024-10-13 PROCEDURE — RXMED WILLOW AMBULATORY MEDICATION CHARGE: Performed by: NURSE PRACTITIONER

## 2024-10-14 ENCOUNTER — APPOINTMENT (OUTPATIENT)
Dept: NEUROLOGY | Facility: MEDICAL CENTER | Age: 34
End: 2024-10-14
Attending: SPECIALIST
Payer: MEDICARE

## 2024-10-14 ENCOUNTER — PHYSICAL THERAPY (OUTPATIENT)
Dept: PHYSICAL THERAPY | Facility: REHABILITATION | Age: 34
End: 2024-10-14
Attending: SPECIALIST
Payer: MEDICARE

## 2024-10-14 DIAGNOSIS — R26.89 LOSS OF BALANCE: ICD-10-CM

## 2024-10-14 DIAGNOSIS — R53.1 WEAKNESS: ICD-10-CM

## 2024-10-14 DIAGNOSIS — R27.0 ATAXIA: ICD-10-CM

## 2024-10-14 PROCEDURE — 97112 NEUROMUSCULAR REEDUCATION: CPT

## 2024-10-14 PROCEDURE — 97110 THERAPEUTIC EXERCISES: CPT

## 2024-10-16 ENCOUNTER — APPOINTMENT (OUTPATIENT)
Dept: PHYSICAL THERAPY | Facility: REHABILITATION | Age: 34
End: 2024-10-16
Attending: SPECIALIST
Payer: MEDICARE

## 2024-10-17 LAB — TEST NAME 95000: NORMAL

## 2024-10-18 ENCOUNTER — PHARMACY VISIT (OUTPATIENT)
Dept: PHARMACY | Facility: MEDICAL CENTER | Age: 34
End: 2024-10-18
Payer: MEDICARE

## 2024-10-18 PROCEDURE — RXMED WILLOW AMBULATORY MEDICATION CHARGE: Performed by: SPECIALIST

## 2024-10-18 PROCEDURE — RXMED WILLOW AMBULATORY MEDICATION CHARGE: Performed by: NURSE PRACTITIONER

## 2024-10-18 PROCEDURE — RXOTC WILLOW AMBULATORY OTC CHARGE

## 2024-10-18 RX ORDER — CLINDAMYCIN PHOSPHATE 10 MG/G
GEL TOPICAL
Qty: 60 G | Refills: 3 | OUTPATIENT
Start: 2024-10-18

## 2024-10-18 RX ORDER — SPIRONOLACTONE 50 MG/1
TABLET, FILM COATED ORAL
Qty: 90 TABLET | Refills: 3 | OUTPATIENT
Start: 2024-10-18

## 2024-10-18 RX ORDER — TIZANIDINE HYDROCHLORIDE 4 MG/1
CAPSULE, GELATIN COATED ORAL
Qty: 90 CAPSULE | Refills: 0 | OUTPATIENT
Start: 2024-10-18

## 2024-10-21 ENCOUNTER — PHYSICAL THERAPY (OUTPATIENT)
Dept: PHYSICAL THERAPY | Facility: REHABILITATION | Age: 34
End: 2024-10-21
Attending: SPECIALIST
Payer: MEDICARE

## 2024-10-21 DIAGNOSIS — R53.1 WEAKNESS: ICD-10-CM

## 2024-10-21 DIAGNOSIS — R26.89 LOSS OF BALANCE: ICD-10-CM

## 2024-10-21 DIAGNOSIS — R27.0 ATAXIA: ICD-10-CM

## 2024-10-21 PROCEDURE — 97110 THERAPEUTIC EXERCISES: CPT

## 2024-10-21 PROCEDURE — RXMED WILLOW AMBULATORY MEDICATION CHARGE: Performed by: NURSE PRACTITIONER

## 2024-10-22 ENCOUNTER — OFFICE VISIT (OUTPATIENT)
Dept: URGENT CARE | Facility: CLINIC | Age: 34
End: 2024-10-22
Payer: MEDICARE

## 2024-10-22 ENCOUNTER — PHARMACY VISIT (OUTPATIENT)
Dept: PHARMACY | Facility: MEDICAL CENTER | Age: 34
End: 2024-10-22
Payer: COMMERCIAL

## 2024-10-22 VITALS
DIASTOLIC BLOOD PRESSURE: 70 MMHG | WEIGHT: 263 LBS | HEART RATE: 76 BPM | RESPIRATION RATE: 24 BRPM | HEIGHT: 64 IN | SYSTOLIC BLOOD PRESSURE: 122 MMHG | BODY MASS INDEX: 44.9 KG/M2 | OXYGEN SATURATION: 95 % | TEMPERATURE: 98.1 F

## 2024-10-22 DIAGNOSIS — J01.90 ACUTE RHINOSINUSITIS: ICD-10-CM

## 2024-10-22 PROCEDURE — RXMED WILLOW AMBULATORY MEDICATION CHARGE: Performed by: NURSE PRACTITIONER

## 2024-10-22 PROCEDURE — 3074F SYST BP LT 130 MM HG: CPT | Performed by: NURSE PRACTITIONER

## 2024-10-22 PROCEDURE — 99214 OFFICE O/P EST MOD 30 MIN: CPT | Performed by: NURSE PRACTITIONER

## 2024-10-22 PROCEDURE — 3078F DIAST BP <80 MM HG: CPT | Performed by: NURSE PRACTITIONER

## 2024-10-22 RX ORDER — AZITHROMYCIN 250 MG/1
TABLET, FILM COATED ORAL
Qty: 6 TABLET | Refills: 0 | Status: SHIPPED | OUTPATIENT
Start: 2024-10-22

## 2024-10-22 ASSESSMENT — FIBROSIS 4 INDEX: FIB4 SCORE: 0.78

## 2024-10-23 ENCOUNTER — APPOINTMENT (OUTPATIENT)
Dept: PHYSICAL THERAPY | Facility: REHABILITATION | Age: 34
End: 2024-10-23
Attending: SPECIALIST
Payer: MEDICARE

## 2024-10-25 ENCOUNTER — PHARMACY VISIT (OUTPATIENT)
Dept: PHARMACY | Facility: MEDICAL CENTER | Age: 34
End: 2024-10-25
Payer: COMMERCIAL

## 2024-10-25 ENCOUNTER — OFFICE VISIT (OUTPATIENT)
Dept: URGENT CARE | Facility: CLINIC | Age: 34
End: 2024-10-25
Payer: MEDICARE

## 2024-10-25 ENCOUNTER — OCCUPATIONAL THERAPY (OUTPATIENT)
Dept: OCCUPATIONAL THERAPY | Facility: REHABILITATION | Age: 34
End: 2024-10-25
Attending: STUDENT IN AN ORGANIZED HEALTH CARE EDUCATION/TRAINING PROGRAM
Payer: MEDICARE

## 2024-10-25 VITALS
SYSTOLIC BLOOD PRESSURE: 116 MMHG | TEMPERATURE: 97.1 F | HEIGHT: 64 IN | WEIGHT: 266.3 LBS | DIASTOLIC BLOOD PRESSURE: 68 MMHG | RESPIRATION RATE: 19 BRPM | HEART RATE: 68 BPM | OXYGEN SATURATION: 98 % | BODY MASS INDEX: 45.46 KG/M2

## 2024-10-25 DIAGNOSIS — Q79.60 EHLERS-DANLOS SYNDROME: ICD-10-CM

## 2024-10-25 DIAGNOSIS — M25.549 ARTHRALGIA OF METACARPOPHALANGEAL JOINT, UNSPECIFIED LATERALITY: ICD-10-CM

## 2024-10-25 DIAGNOSIS — H10.9 BACTERIAL CONJUNCTIVITIS OF RIGHT EYE: ICD-10-CM

## 2024-10-25 PROCEDURE — RXMED WILLOW AMBULATORY MEDICATION CHARGE

## 2024-10-25 PROCEDURE — 99213 OFFICE O/P EST LOW 20 MIN: CPT

## 2024-10-25 PROCEDURE — 3074F SYST BP LT 130 MM HG: CPT

## 2024-10-25 PROCEDURE — 97110 THERAPEUTIC EXERCISES: CPT

## 2024-10-25 PROCEDURE — 3078F DIAST BP <80 MM HG: CPT

## 2024-10-25 PROCEDURE — 97166 OT EVAL MOD COMPLEX 45 MIN: CPT

## 2024-10-25 RX ORDER — OFLOXACIN 3 MG/ML
SOLUTION/ DROPS OPHTHALMIC
Qty: 5 ML | Refills: 0 | Status: SHIPPED | OUTPATIENT
Start: 2024-10-25

## 2024-10-25 ASSESSMENT — ENCOUNTER SYMPTOMS
EYE DISCHARGE: 1
EXACERBATED BY: ACTIVITY
PAIN TIMING: ALL DAY
PAIN SCALE AT LOWEST: 6
ALLEVIATING FACTORS: PAIN MEDICATION
ALLEVIATING FACTORS: REST
EYE REDNESS: 1
PAIN SCALE: 8
PAIN SCALE AT HIGHEST: 10
TACTILE HYPERESTHESIA: 1
AWAKENINGS PER NIGHT: 4
QUALITY: SHARP
QUALITY: ACHING

## 2024-10-25 ASSESSMENT — FIBROSIS 4 INDEX: FIB4 SCORE: 0.78

## 2024-10-28 ENCOUNTER — PHYSICAL THERAPY (OUTPATIENT)
Dept: PHYSICAL THERAPY | Facility: REHABILITATION | Age: 34
End: 2024-10-28
Attending: SPECIALIST
Payer: MEDICARE

## 2024-10-28 DIAGNOSIS — R27.0 ATAXIA: ICD-10-CM

## 2024-10-28 DIAGNOSIS — R26.89 LOSS OF BALANCE: ICD-10-CM

## 2024-10-28 PROCEDURE — 97110 THERAPEUTIC EXERCISES: CPT

## 2024-10-29 ENCOUNTER — OCCUPATIONAL THERAPY (OUTPATIENT)
Dept: OCCUPATIONAL THERAPY | Facility: REHABILITATION | Age: 34
End: 2024-10-29
Attending: STUDENT IN AN ORGANIZED HEALTH CARE EDUCATION/TRAINING PROGRAM
Payer: MEDICARE

## 2024-10-29 ENCOUNTER — OFFICE VISIT (OUTPATIENT)
Dept: URGENT CARE | Facility: CLINIC | Age: 34
End: 2024-10-29
Payer: MEDICARE

## 2024-10-29 VITALS
HEIGHT: 64 IN | RESPIRATION RATE: 15 BRPM | OXYGEN SATURATION: 100 % | BODY MASS INDEX: 45.58 KG/M2 | TEMPERATURE: 97.1 F | WEIGHT: 267 LBS | DIASTOLIC BLOOD PRESSURE: 70 MMHG | HEART RATE: 62 BPM | SYSTOLIC BLOOD PRESSURE: 124 MMHG

## 2024-10-29 DIAGNOSIS — Q79.60 EHLERS-DANLOS SYNDROME: ICD-10-CM

## 2024-10-29 DIAGNOSIS — M25.549 ARTHRALGIA OF METACARPOPHALANGEAL JOINT, UNSPECIFIED LATERALITY: ICD-10-CM

## 2024-10-29 DIAGNOSIS — H60.503 ACUTE OTITIS EXTERNA OF BOTH EARS, UNSPECIFIED TYPE: ICD-10-CM

## 2024-10-29 PROCEDURE — RXMED WILLOW AMBULATORY MEDICATION CHARGE: Performed by: PHYSICIAN ASSISTANT

## 2024-10-29 PROCEDURE — 97530 THERAPEUTIC ACTIVITIES: CPT

## 2024-10-29 PROCEDURE — 97110 THERAPEUTIC EXERCISES: CPT

## 2024-10-29 RX ORDER — CIPROFLOXACIN AND DEXAMETHASONE 3; 1 MG/ML; MG/ML
4 SUSPENSION/ DROPS AURICULAR (OTIC) 2 TIMES DAILY
Qty: 7.5 ML | Refills: 0 | Status: SHIPPED | OUTPATIENT
Start: 2024-10-29

## 2024-10-29 ASSESSMENT — ENCOUNTER SYMPTOMS
VOMITING: 0
FEVER: 0
CHILLS: 0
NAUSEA: 0

## 2024-10-29 ASSESSMENT — FIBROSIS 4 INDEX: FIB4 SCORE: 0.78

## 2024-10-29 NOTE — DISCHARGE PLANNING
Alert team note:  Patient is sleeping.  No concerns expressed by RN.  Continue legal hold.   What Is The Reason For Today's Visit?: Full Body Skin Examination Additional History: Spots of concern: superior forehead

## 2024-10-30 ENCOUNTER — HOSPITAL ENCOUNTER (EMERGENCY)
Facility: MEDICAL CENTER | Age: 34
End: 2024-10-30
Attending: EMERGENCY MEDICINE
Payer: MEDICARE

## 2024-10-30 ENCOUNTER — PHARMACY VISIT (OUTPATIENT)
Dept: PHARMACY | Facility: MEDICAL CENTER | Age: 34
End: 2024-10-30
Payer: COMMERCIAL

## 2024-10-30 ENCOUNTER — APPOINTMENT (OUTPATIENT)
Dept: PHYSICAL THERAPY | Facility: REHABILITATION | Age: 34
End: 2024-10-30
Attending: SPECIALIST
Payer: MEDICARE

## 2024-10-30 VITALS
TEMPERATURE: 98 F | RESPIRATION RATE: 14 BRPM | DIASTOLIC BLOOD PRESSURE: 56 MMHG | OXYGEN SATURATION: 94 % | WEIGHT: 270.5 LBS | HEART RATE: 67 BPM | SYSTOLIC BLOOD PRESSURE: 129 MMHG | BODY MASS INDEX: 46.43 KG/M2

## 2024-10-30 DIAGNOSIS — R11.2 NAUSEA AND VOMITING, UNSPECIFIED VOMITING TYPE: ICD-10-CM

## 2024-10-30 DIAGNOSIS — G90.A POSTURAL ORTHOSTATIC TACHYCARDIA SYNDROME: ICD-10-CM

## 2024-10-30 DIAGNOSIS — R19.7 DIARRHEA, UNSPECIFIED TYPE: ICD-10-CM

## 2024-10-30 LAB
ALBUMIN SERPL BCP-MCNC: 4.5 G/DL (ref 3.2–4.9)
ALBUMIN/GLOB SERPL: 2.1 G/DL
ALP SERPL-CCNC: 85 U/L (ref 30–99)
ALT SERPL-CCNC: 42 U/L (ref 2–50)
ANION GAP SERPL CALC-SCNC: 15 MMOL/L (ref 7–16)
AST SERPL-CCNC: 23 U/L (ref 12–45)
BASOPHILS # BLD AUTO: 0.9 % (ref 0–1.8)
BASOPHILS # BLD: 0.05 K/UL (ref 0–0.12)
BILIRUB SERPL-MCNC: 0.4 MG/DL (ref 0.1–1.5)
BUN SERPL-MCNC: 16 MG/DL (ref 8–22)
CALCIUM ALBUM COR SERPL-MCNC: 9.3 MG/DL (ref 8.5–10.5)
CALCIUM SERPL-MCNC: 9.7 MG/DL (ref 8.5–10.5)
CHLORIDE SERPL-SCNC: 108 MMOL/L (ref 96–112)
CO2 SERPL-SCNC: 14 MMOL/L (ref 20–33)
CREAT SERPL-MCNC: 0.8 MG/DL (ref 0.5–1.4)
EOSINOPHIL # BLD AUTO: 0.2 K/UL (ref 0–0.51)
EOSINOPHIL NFR BLD: 3.6 % (ref 0–6.9)
ERYTHROCYTE [DISTWIDTH] IN BLOOD BY AUTOMATED COUNT: 43 FL (ref 35.9–50)
GFR SERPLBLD CREATININE-BSD FMLA CKD-EPI: 98 ML/MIN/1.73 M 2
GLOBULIN SER CALC-MCNC: 2.1 G/DL (ref 1.9–3.5)
GLUCOSE SERPL-MCNC: 143 MG/DL (ref 65–99)
HCG SERPL QL: NEGATIVE
HCT VFR BLD AUTO: 40.3 % (ref 37–47)
HGB BLD-MCNC: 14.3 G/DL (ref 12–16)
IMM GRANULOCYTES # BLD AUTO: 0.02 K/UL (ref 0–0.11)
IMM GRANULOCYTES NFR BLD AUTO: 0.4 % (ref 0–0.9)
LIPASE SERPL-CCNC: 34 U/L (ref 11–82)
LYMPHOCYTES # BLD AUTO: 1.81 K/UL (ref 1–4.8)
LYMPHOCYTES NFR BLD: 32.5 % (ref 22–41)
MCH RBC QN AUTO: 33.2 PG (ref 27–33)
MCHC RBC AUTO-ENTMCNC: 35.5 G/DL (ref 32.2–35.5)
MCV RBC AUTO: 93.5 FL (ref 81.4–97.8)
MONOCYTES # BLD AUTO: 0.55 K/UL (ref 0–0.85)
MONOCYTES NFR BLD AUTO: 9.9 % (ref 0–13.4)
NEUTROPHILS # BLD AUTO: 2.94 K/UL (ref 1.82–7.42)
NEUTROPHILS NFR BLD: 52.7 % (ref 44–72)
NRBC # BLD AUTO: 0 K/UL
NRBC BLD-RTO: 0 /100 WBC (ref 0–0.2)
PLATELET # BLD AUTO: 194 K/UL (ref 164–446)
PMV BLD AUTO: 11.5 FL (ref 9–12.9)
POTASSIUM SERPL-SCNC: 4.2 MMOL/L (ref 3.6–5.5)
PROT SERPL-MCNC: 6.6 G/DL (ref 6–8.2)
RBC # BLD AUTO: 4.31 M/UL (ref 4.2–5.4)
SODIUM SERPL-SCNC: 137 MMOL/L (ref 135–145)
WBC # BLD AUTO: 5.6 K/UL (ref 4.8–10.8)

## 2024-10-30 PROCEDURE — 83690 ASSAY OF LIPASE: CPT

## 2024-10-30 PROCEDURE — 96375 TX/PRO/DX INJ NEW DRUG ADDON: CPT

## 2024-10-30 PROCEDURE — 80053 COMPREHEN METABOLIC PANEL: CPT

## 2024-10-30 PROCEDURE — 99284 EMERGENCY DEPT VISIT MOD MDM: CPT

## 2024-10-30 PROCEDURE — 85025 COMPLETE CBC W/AUTO DIFF WBC: CPT

## 2024-10-30 PROCEDURE — 700111 HCHG RX REV CODE 636 W/ 250 OVERRIDE (IP): Mod: JZ,UD | Performed by: EMERGENCY MEDICINE

## 2024-10-30 PROCEDURE — 36415 COLL VENOUS BLD VENIPUNCTURE: CPT

## 2024-10-30 PROCEDURE — 96374 THER/PROPH/DIAG INJ IV PUSH: CPT

## 2024-10-30 PROCEDURE — 84703 CHORIONIC GONADOTROPIN ASSAY: CPT

## 2024-10-30 RX ORDER — PROCHLORPERAZINE MALEATE 10 MG
10 TABLET ORAL EVERY 6 HOURS PRN
Qty: 30 TABLET | Refills: 0 | Status: SHIPPED | OUTPATIENT
Start: 2024-10-30

## 2024-10-30 RX ORDER — LORAZEPAM 2 MG/ML
1 INJECTION INTRAMUSCULAR ONCE
Status: COMPLETED | OUTPATIENT
Start: 2024-10-30 | End: 2024-10-30

## 2024-10-30 RX ORDER — PROCHLORPERAZINE EDISYLATE 5 MG/ML
10 INJECTION INTRAMUSCULAR; INTRAVENOUS ONCE
Status: COMPLETED | OUTPATIENT
Start: 2024-10-30 | End: 2024-10-30

## 2024-10-30 RX ADMIN — LORAZEPAM 1 MG: 2 INJECTION INTRAMUSCULAR; INTRAVENOUS at 18:00

## 2024-10-30 RX ADMIN — PROCHLORPERAZINE EDISYLATE 10 MG: 5 INJECTION INTRAMUSCULAR; INTRAVENOUS at 18:00

## 2024-10-30 ASSESSMENT — FIBROSIS 4 INDEX: FIB4 SCORE: 0.78

## 2024-10-31 ENCOUNTER — OCCUPATIONAL THERAPY (OUTPATIENT)
Dept: OCCUPATIONAL THERAPY | Facility: REHABILITATION | Age: 34
End: 2024-10-31
Attending: STUDENT IN AN ORGANIZED HEALTH CARE EDUCATION/TRAINING PROGRAM
Payer: MEDICARE

## 2024-10-31 DIAGNOSIS — M25.549 ARTHRALGIA OF METACARPOPHALANGEAL JOINT, UNSPECIFIED LATERALITY: ICD-10-CM

## 2024-10-31 DIAGNOSIS — Q79.60 EHLERS-DANLOS SYNDROME: ICD-10-CM

## 2024-10-31 PROCEDURE — RXMED WILLOW AMBULATORY MEDICATION CHARGE: Performed by: NURSE PRACTITIONER

## 2024-10-31 PROCEDURE — 97530 THERAPEUTIC ACTIVITIES: CPT

## 2024-10-31 PROCEDURE — 97110 THERAPEUTIC EXERCISES: CPT

## 2024-10-31 RX ORDER — IVABRADINE 7.5 MG/1
7.5 TABLET, FILM COATED ORAL 2 TIMES DAILY WITH MEALS
Qty: 180 TABLET | Refills: 1 | Status: SHIPPED | OUTPATIENT
Start: 2024-10-31

## 2024-11-01 ENCOUNTER — PHARMACY VISIT (OUTPATIENT)
Dept: PHARMACY | Facility: MEDICAL CENTER | Age: 34
End: 2024-11-01
Payer: COMMERCIAL

## 2024-11-04 ENCOUNTER — OFFICE VISIT (OUTPATIENT)
Dept: MEDICAL GROUP | Facility: MEDICAL CENTER | Age: 34
End: 2024-11-04
Payer: MEDICARE

## 2024-11-04 ENCOUNTER — APPOINTMENT (OUTPATIENT)
Dept: PHYSICAL THERAPY | Facility: REHABILITATION | Age: 34
End: 2024-11-04
Attending: SPECIALIST
Payer: MEDICARE

## 2024-11-04 VITALS
DIASTOLIC BLOOD PRESSURE: 68 MMHG | OXYGEN SATURATION: 97 % | HEART RATE: 54 BPM | BODY MASS INDEX: 45.27 KG/M2 | WEIGHT: 265.2 LBS | TEMPERATURE: 98 F | HEIGHT: 64 IN | SYSTOLIC BLOOD PRESSURE: 114 MMHG

## 2024-11-04 DIAGNOSIS — K29.70 VIRAL GASTRITIS: ICD-10-CM

## 2024-11-04 DIAGNOSIS — H92.03 EAR PAIN, BILATERAL: ICD-10-CM

## 2024-11-04 DIAGNOSIS — H60.509 ACUTE OTITIS EXTERNA, UNSPECIFIED LATERALITY, UNSPECIFIED TYPE: ICD-10-CM

## 2024-11-04 PROCEDURE — 3078F DIAST BP <80 MM HG: CPT | Performed by: STUDENT IN AN ORGANIZED HEALTH CARE EDUCATION/TRAINING PROGRAM

## 2024-11-04 PROCEDURE — 3074F SYST BP LT 130 MM HG: CPT | Performed by: STUDENT IN AN ORGANIZED HEALTH CARE EDUCATION/TRAINING PROGRAM

## 2024-11-04 PROCEDURE — RXMED WILLOW AMBULATORY MEDICATION CHARGE

## 2024-11-04 PROCEDURE — 99213 OFFICE O/P EST LOW 20 MIN: CPT | Performed by: STUDENT IN AN ORGANIZED HEALTH CARE EDUCATION/TRAINING PROGRAM

## 2024-11-04 RX ORDER — KETOCONAZOLE 20 MG/G
CREAM TOPICAL
COMMUNITY

## 2024-11-04 RX ORDER — CEPHALEXIN 500 MG/1
CAPSULE ORAL
COMMUNITY

## 2024-11-04 RX ORDER — OXYCODONE AND ACETAMINOPHEN 5; 325 MG/1; MG/1
TABLET ORAL
COMMUNITY

## 2024-11-04 RX ORDER — LOPERAMIDE HYDROCHLORIDE 2 MG/1
CAPSULE ORAL
COMMUNITY

## 2024-11-04 RX ORDER — POLYMYXIN B SULFATE AND TRIMETHOPRIM 1; 10000 MG/ML; [USP'U]/ML
SOLUTION OPHTHALMIC
COMMUNITY

## 2024-11-04 RX ORDER — TRAMADOL HYDROCHLORIDE 50 MG/1
TABLET ORAL
COMMUNITY

## 2024-11-04 RX ORDER — PHENAZOPYRIDINE HYDROCHLORIDE 200 MG/1
TABLET, FILM COATED ORAL
COMMUNITY
Start: 2024-09-30

## 2024-11-04 RX ORDER — SULFAMETHOXAZOLE AND TRIMETHOPRIM 800; 160 MG/1; MG/1
1 TABLET ORAL 2 TIMES DAILY
COMMUNITY

## 2024-11-04 RX ORDER — OXYCODONE HYDROCHLORIDE 5 MG/1
TABLET ORAL
COMMUNITY

## 2024-11-04 RX ORDER — DOXYCYCLINE 100 MG/1
CAPSULE ORAL
COMMUNITY

## 2024-11-04 RX ORDER — ONDANSETRON 4 MG/1
TABLET, ORALLY DISINTEGRATING ORAL
COMMUNITY

## 2024-11-04 RX ORDER — LORAZEPAM 1 MG/1
TABLET ORAL
COMMUNITY

## 2024-11-04 ASSESSMENT — ENCOUNTER SYMPTOMS
CHILLS: 0
FEVER: 0
PHOTOPHOBIA: 0
DOUBLE VISION: 0
BLURRED VISION: 0

## 2024-11-04 ASSESSMENT — FIBROSIS 4 INDEX: FIB4 SCORE: 0.64

## 2024-11-04 NOTE — PROGRESS NOTES
Subjective:     CC:otitis externa/ ear pain    HPI:   Kristin presents today with    PMH   # Ehler Danos syndrome,hypermobile  # HTN  # Restrictive lung disease (PFT 2019 wo significant bronchodilator response)  - has been on inhalers, with reported improvement  # migraine- ( rimegepant, emgality, topiramate, lamotrigine, sumatriptan),   # TOYNA - intolerant of mask  # POTS/ SVT on ivabradine and metoprolol 25mg xr  # hidraadenitis supprativa on humira  # nexplanon  # chronic pain associated with EDS ( gabapentin, ibuprofen, acetaminophen),   # GERD/ gastroparesis related to EDS  # PCOS- spironolactone  # schizo on geodon  # hemorrhoids  # history of idiopathic intracranial hypertension  # hx of transverse myelitis vs functional neurologic disorder  # hx of optic neuritits previously on cellcept (4534-1719) - no longer following with neuroophthalmology  # kapil 1:80 (2024), ccp 4 (2024), rheum factor <10 (2024)     Specialist  - cardiology - [facility]   - dermatology- [facility] dermatology  - on Humira for hidraadenitis   - gastroenterology - [facility], nafld, polyp, colonoscopy   - pain management/ neurology - [facility]  - neuromuscular neurology- [facility]  - ophthalmology- [facility] - EDS visual changes     Device  - interstim stimulator  - Hx of l4-l5 fusion      EDS monitoring/ work up  - 2022 Echocardiogram LVEF 55% - aortic root normal for BSA  - 2022 stress test   - follows with ophthalmology  - hx of spine surgery/ fusion   - CT cervical spine- No acute fracture or dislocation seen in the CT scan of the cervical spine.   - EGD- 2017 dysphagia, gastroparesis  - colonoscopy - 5 year recall    =========================================  TODAY  =========================================  … The month of October was seen 3 times in urgent care for acute rhinosinusitis, bacterial conjunctivitis and otitis externa of both ears  10/30/2024 patient was seen in the ED for nausea vomiting, diarrhea, nausea cannot keep anything down    Was  "prescribed ciprofloxacin-dexamethasone        Health Maintenance:     ROS:  Review of Systems   Constitutional:  Negative for chills and fever.   HENT:  Positive for ear pain. Negative for ear discharge and hearing loss.    Eyes:  Negative for blurred vision, double vision and photophobia.       Objective:     Exam:  /68   Pulse (!) 54   Temp 36.7 °C (98 °F) (Temporal)   Ht 1.626 m (5' 4\")   Wt 120 kg (265 lb 3.2 oz)   SpO2 97%   BMI 45.52 kg/m²  Body mass index is 45.52 kg/m².    Physical Exam  HENT:      Right Ear: Hearing normal.      Left Ear: Hearing normal.      Ears:      Comments: Canal dry, mild redness, no discharge, edema    TM was essentially appears benign, no significant erythema/ vasculature, no fluid observe behind the membrane     TTP of mastoid and lower jaw          Labs:     Assessment & Plan:     34 y.o. female with the following -     1. Ear pain, bilateral  2. Acute otitis externa, unspecified laterality, unspecified type      Assessment & Plan  1. Viral Gastritis.  The patient experienced vomiting and inability to keep food down, leading to a diagnosis of viral gastritis at the ER. She was given medication for vomiting. She should continue to monitor her symptoms and seek medical attention if they worsen. Overall improved.     2. Ear Infection.  The patient has been experiencing persistent ear pain and was previously treated with steroid eardrops containing an antibiotic, administered twice a day, 4 drops each time. The ear canal shows some irritation but no active infection, and the tympanic membrane appears normal.     Overall exam consistent with resolving otitis externa with residual mild dryness/mild erythema but no significant edema or discharge.    She does have an appointment with ENT next week.  Patient agreeable to plan to monitor for now.     We discuss if there is any signs of infection, fever, chills, or her symptoms worsen then she may needs to go to ER for IV " antibiotics.     She is advised to complete the current course of eardrops and monitor the condition. If symptoms worsen, such as fever or chills, she should seek immediate medical attention at the ER.    3. Mastoid sensitivity.  Etiology unclear  Patient report mastoid sensitivity during examination and some discomfort in lymph node at the jaw angle.  She reports she does have history of mastoiditis?  On exam there is no acute otitis media noted in bilateral ear reviewed last urgent care note which physical exam was not consistent with finding of otitis media.  However this does not rule out mastoiditis.    Patient agreeable to monitor she does have follow-up with ENT next week she denies fever chills and worsening symptoms however feels this symptom is not improving at expected pace.    Instruction given to the ER if her symptoms persist at this time.          Return if symptoms worsen or fail to improve.    Please note that this dictation was created using voice recognition software. I have made every reasonable attempt to correct obvious errors, but I expect that there are errors of grammar and possibly content that I did not discover before finalizing the note.

## 2024-11-04 NOTE — OP THERAPY DAILY TREATMENT
Outpatient Occupational Therapy  DAILY TREATMENT     [facility] Occupational Therapy [address]  [address]  [place] [address]  Phone:  [phone]  Fax:  [phone]    Date: 11/05/2024    Patient: Kristin Balderrama  YOB: 1990  MRN: [id number]     Time Calculation                 Chief Complaint: No chief complaint on file.    Visit #: 4    SUBJECTIVE:  ***    OBJECTIVE:  Current objective measures:   Postural Observations  Seated posture: fair  Standing posture: fair  Correction of posture: makes symptoms better        Neurological Testing   Sensation   Wrist/Hand   Left   Hypersensation: light touch      Right   Hypersensation: light touch        Additional Neurological Details  Bilateral hand hypersensitivity; especially with hot and cold  Quick response to therapist tapping hands.       Active Range of Motion      Left Wrist   Normal active range of motion     Right Wrist   Normal active range of motion     Left Thumb     Normal active range of motion     Right Thumb     Normal active range of motion     Left Digits    Normal active range of motion     Right Digits   Normal active range of motion  Left Shoulder   Normal active range of motion     Right Shoulder   Flexion: 160 degrees   Extension: WFL  Abduction: 90 degrees   Adduction: WFL  External rotation 0°: WFL     Additional Active Range of Motion Details  Patient stated she has had a right rotator cuff tear in the past   Some hypermobility noted; however quite minimal on examination      Scapular Mobility   Left Shoulder   Scapular Mobility with Shoulder to 90° FF   Scapular winging: minimal     Right Shoulder   Scapular Mobility with Shoulder to 90° FF   Scapular winging: minimal     Strength      Left Wrist/Hand       (2nd hand position)     Trial 1: 47     Right Wrist/Hand       (2nd hand position)     Trial 1: 51     Tests      Left Wrist/Hand   Negative CMC grind, distal radial-ulnar joint stress,  extrinsic extensor tightness and extrinsic flexor tightness.      Right Wrist/Hand   Negative CMC grind, distal radial-ulnar joint stress, extrinsic extensor tightness and extrinsic flexor tightness.      Left Elbow   Negative Cozen's.      Right Elbow   Negative Cozen's.      General Comments      Wrist/Hand Comments  9 hole peg test:  R=19 seconds  L = 19 seconds      Activities of Daily Living:      Bathing:      Bathing comments:   Unable to tolerate standing in the small step in shower cubicle and tending to sponge bathe.     Dressing:     Lower body adaptive equipment used: long shoe horn, reacher, dressing stick and sock aid     Patient wanting to utilize her Body Braid to stabilize her joints during ADLs and currently is Mod I with UB and max A with lower body.       ADL Comments:  Uses w/c on occasion (3-4 x a year).  Mod I dressing.   Small step in shower. Difficult to use so normally sponge bathes  Has a commode which OTR recommended she use over the toilet as a toilet frame to assist with toilet transfers.    Unable to stand more than 5 minutes at a counter during a functional task     Poor sleep hygiene = only able to get about 2 hours continuous sleep  Labored breathing, often holding breath during functional tasks at  time of evaluation.        Therapeutic Exercises (CPT 27005):     1. medium resistance theraband for gross UE and upper back strengthening    2. medium resistance theraputty for hand strengthening, on hold 10/31/24 due to GI virus    3. 9# digi-flex, 10 x each hand    4. 11# graded clip, 10 x each hand    5. flex bar - green - firm    6. fine motor manipulation and dexterity, 9 hole peg test, grooved pegboard, manipulation of coins and use of screwdriver    Therapeutic Exercise Summary:         Therapeutic Treatments and Modalities:    1. Therapeutic Activities (CPT 44830), energy conservation, diaphragmatic breathing, sleep hygiene    Therapeutic Treatments and Modalities Summary:  "Patient has read the handouts regarding sleep hygiene and energy conservation techniques.  She has started working on incorporating incorporating some of the techniques and will continue to apply the techniques.  She has started doing diaphragmatic breathing during daily tasks and OTR emphasized using techniques especially during lower body dressing and if she feels she is \"straining\" while bending or reaching.      Time-based treatments/modalities:           Pain rating before treatment: {PAIN NUMBERS_1-10:05561}  Pain rating after treatment: {PAIN NUMBERS_1-10:57855}    ASSESSMENT:   Response to treatment: ***    PLAN/RECOMMENDATIONS:   Plan for treatment: {Therapy Plan:529943884::\"therapy treatment to continue next visit\"}.  Planned interventions for next visit: {planned OT Interventions:607028229}         "

## 2024-11-05 ENCOUNTER — PHARMACY VISIT (OUTPATIENT)
Dept: PHARMACY | Facility: MEDICAL CENTER | Age: 34
End: 2024-11-05
Payer: COMMERCIAL

## 2024-11-05 ENCOUNTER — TELEPHONE (OUTPATIENT)
Dept: CARDIOLOGY | Facility: MEDICAL CENTER | Age: 34
End: 2024-11-05
Payer: MEDICARE

## 2024-11-05 ENCOUNTER — APPOINTMENT (OUTPATIENT)
Dept: OCCUPATIONAL THERAPY | Facility: REHABILITATION | Age: 34
End: 2024-11-05
Attending: STUDENT IN AN ORGANIZED HEALTH CARE EDUCATION/TRAINING PROGRAM
Payer: MEDICARE

## 2024-11-05 PROCEDURE — RXMED WILLOW AMBULATORY MEDICATION CHARGE: Performed by: PHYSICIAN ASSISTANT

## 2024-11-05 NOTE — TELEPHONE ENCOUNTER
Received Refill PA request via [facility]  for CORLANOR 7.5MG TAB. (Quantity:60, Day Supply:30)     Insurance: [employer]  Member ID:  [id number]  BIN: [id number]  PCN: [id number]  Group: [id number]     Ran Test claim via [place] & medication Pays for a refill too soon until 11/26 copay. Will outreach to patient to offer specialty pharmacy services and or release to preferred pharmacy  Rx released to [place]

## 2024-11-06 ENCOUNTER — APPOINTMENT (OUTPATIENT)
Dept: PHYSICAL THERAPY | Facility: REHABILITATION | Age: 34
End: 2024-11-06
Attending: SPECIALIST
Payer: MEDICARE

## 2024-11-06 ENCOUNTER — PHARMACY VISIT (OUTPATIENT)
Dept: PHARMACY | Facility: MEDICAL CENTER | Age: 34
End: 2024-11-06
Payer: COMMERCIAL

## 2024-11-07 ENCOUNTER — OCCUPATIONAL THERAPY (OUTPATIENT)
Dept: OCCUPATIONAL THERAPY | Facility: REHABILITATION | Age: 34
End: 2024-11-07
Attending: STUDENT IN AN ORGANIZED HEALTH CARE EDUCATION/TRAINING PROGRAM
Payer: MEDICARE

## 2024-11-07 DIAGNOSIS — M25.549 ARTHRALGIA OF METACARPOPHALANGEAL JOINT, UNSPECIFIED LATERALITY: ICD-10-CM

## 2024-11-07 DIAGNOSIS — Q79.60 EHLERS-DANLOS SYNDROME: ICD-10-CM

## 2024-11-07 PROCEDURE — 97110 THERAPEUTIC EXERCISES: CPT

## 2024-11-07 PROCEDURE — RXMED WILLOW AMBULATORY MEDICATION CHARGE: Performed by: NURSE PRACTITIONER

## 2024-11-07 PROCEDURE — 97530 THERAPEUTIC ACTIVITIES: CPT

## 2024-11-07 PROCEDURE — RXMED WILLOW AMBULATORY MEDICATION CHARGE: Performed by: OTOLARYNGOLOGY

## 2024-11-07 NOTE — OP THERAPY DAILY TREATMENT
Outpatient Occupational Therapy  DAILY TREATMENT     [facility] Occupational Therapy [address].  [address]  [place] [address]  Phone:  [phone]  Fax:  [phone]    Date: 11/07/2024    Patient: Kristin Balderrama  YOB: 1990  MRN: [id number]     Time Calculation  Start time: 1100  Stop time: 1145 Time Calculation (min): 45 minutes         Chief Complaint: Extremity Weakness and Self Care Duties    Visit #: 4    SUBJECTIVE:  I am still struggling with my ear infection.      OBJECTIVE:  Current objective measures:   Postural Observations  Seated posture: fair  Standing posture: fair  Correction of posture: makes symptoms better        Neurological Testing   Sensation   Wrist/Hand   Left   Hypersensation: light touch   Right   Hypersensation: light touch        Additional Neurological Details  Bilateral hand hypersensitivity; especially with hot and cold  Quick response to therapist tapping hands.       Active Range of Motion   Normal active range of motion for all right UE except shoulder     Right Shoulder   Flexion: 160 degrees   Extension: WFL  Abduction: 90 degrees   Adduction: WFL  External rotation 0°: WFL     Additional Active Range of Motion Details  Patient stated she has had a right rotator cuff tear in the past   Some hypermobility noted; however quite minimal on examination      Scapular Mobility   Left Shoulder   Scapular Mobility with Shoulder to 90° FF   Scapular winging: minimal     Right Shoulder   Scapular Mobility with Shoulder to 90° FF   Scapular winging: minimal     Strength      Left Wrist/Hand    (2nd hand position)     Trial 1: 62     Right Wrist/Hand    (2nd hand position)     Trial 1: 60     Tests      Left Wrist/Hand   Negative CMC grind, distal radial-ulnar joint stress, extrinsic extensor tightness and extrinsic flexor tightness.      Right Wrist/Hand   Negative CMC grind, distal radial-ulnar joint stress, extrinsic extensor tightness and  extrinsic flexor tightness.      Left Elbow   Negative Cozen's.      Right Elbow   Negative Cozen's.      General Comments      Wrist/Hand Comments  9 hole peg test:  R=18 seconds  L = 19 seconds      Activities of Daily Living:      Bathing:      Bathing comments:   Unable to tolerate standing in the small step in shower cubicle and tending to sponge bathe.     Dressing:     Lower body adaptive equipment used: long shoe horn, reacher, dressing stick and sock aid     Patient wanting to utilize her Body Braid to stabilize her joints during ADLs and currently is Mod I with UB and max A with lower body.       ADL Comments:  Uses w/c on occasion (3-4 x a year).  Mod I dressing.   Small step in shower. Difficult to use so normally sponge bathes  Has a commode which OTR recommended she use over the toilet as a toilet frame to assist with toilet transfers.    Unable to stand more than 5 minutes at a counter during a functional task     Poor sleep hygiene = only able to get about 2 hours continuous sleep  Labored breathing, often holding breath during functional tasks at  time of evaluation.        Therapeutic Exercises (CPT 84413):     1. AROM of right shoulder    2. AAROM of right shoulder    3. Shoulder arc to enhance shouler AROM    4. medium resistance theraband for gross UE and upper back strengthening    5. medium resistance theraputty for hand strengthening    6. flex bar - green - firm    7. 11# graded clip, 10 x each hand    8. 9# digi-flex, 10 x each hand    9. fine motor manipulation and dexterity, 9 hole peg test, grooved pegboard, manipulation of coins and use of screwdriver    Therapeutic Exercise Summary:         Therapeutic Treatments and Modalities:    1. Therapeutic Activities (CPT 25098), energy conservation, diaphragmatic breathing, sleep hygiene    Therapeutic Treatments and Modalities Summary: Sleep hygiene:  patient stated she has stopped using the IPad  prior to going to sleep and doing some  "relaxation prior to going to sleep.  Able to sleep 3 hours continuously now.  Only taking about 30 minutes to fall asleep   Energy conservation:  now trying to utilize a calendar to organize her day so not to overdo it.  Seated while folding laundry, sitting to dress and sponge bathing.  Using AE for lower body dressing so less effort utilized.  Incorporated diaphragmatic techniques during therapy session today and relaxation techniques to full body working from feet upwards.      Time-based treatments/modalities:  Therapeutic exercise minutes (CPT 91200): 30 minutes  Therapeutic activity minutes (CPT 94553): 15 minutes        Pain rating before treatment: 6  Pain rating after treatment: 6  \"All over\"   ASSESSMENT:   Response to treatment: progressing in therapy and adhering to home program.     PLAN/RECOMMENDATIONS:   Plan for treatment: therapy treatment to continue next visit.  Planned interventions for next visit: continue with current treatment, hot/cold pack therapy (CPT 55486), self care ADL training (CPT 66168), therapeutic activities (CPT 02270), and therapeutic exercise (CPT 89746)         "

## 2024-11-08 ENCOUNTER — PHARMACY VISIT (OUTPATIENT)
Dept: PHARMACY | Facility: MEDICAL CENTER | Age: 34
End: 2024-11-08
Payer: COMMERCIAL

## 2024-11-11 ENCOUNTER — APPOINTMENT (OUTPATIENT)
Dept: PHYSICAL THERAPY | Facility: REHABILITATION | Age: 34
End: 2024-11-11
Attending: SPECIALIST
Payer: MEDICARE

## 2024-11-11 ENCOUNTER — HOSPITAL ENCOUNTER (EMERGENCY)
Facility: MEDICAL CENTER | Age: 34
End: 2024-11-11
Attending: EMERGENCY MEDICINE
Payer: MEDICARE

## 2024-11-11 ENCOUNTER — PHARMACY VISIT (OUTPATIENT)
Dept: PHARMACY | Facility: MEDICAL CENTER | Age: 34
End: 2024-11-11
Payer: COMMERCIAL

## 2024-11-11 VITALS
OXYGEN SATURATION: 98 % | HEIGHT: 64 IN | BODY MASS INDEX: 44.94 KG/M2 | DIASTOLIC BLOOD PRESSURE: 83 MMHG | HEART RATE: 71 BPM | RESPIRATION RATE: 20 BRPM | TEMPERATURE: 97.9 F | WEIGHT: 263.23 LBS | SYSTOLIC BLOOD PRESSURE: 144 MMHG

## 2024-11-11 DIAGNOSIS — H70.91 MASTOIDITIS OF RIGHT SIDE: ICD-10-CM

## 2024-11-11 PROCEDURE — A9270 NON-COVERED ITEM OR SERVICE: HCPCS | Performed by: EMERGENCY MEDICINE

## 2024-11-11 PROCEDURE — RXMED WILLOW AMBULATORY MEDICATION CHARGE: Performed by: NURSE PRACTITIONER

## 2024-11-11 PROCEDURE — RXMED WILLOW AMBULATORY MEDICATION CHARGE: Performed by: EMERGENCY MEDICINE

## 2024-11-11 PROCEDURE — 99283 EMERGENCY DEPT VISIT LOW MDM: CPT

## 2024-11-11 PROCEDURE — 700102 HCHG RX REV CODE 250 W/ 637 OVERRIDE(OP): Performed by: EMERGENCY MEDICINE

## 2024-11-11 RX ORDER — IBUPROFEN 600 MG/1
600 TABLET, FILM COATED ORAL ONCE
Status: COMPLETED | OUTPATIENT
Start: 2024-11-11 | End: 2024-11-11

## 2024-11-11 RX ORDER — OXYCODONE AND ACETAMINOPHEN 5; 325 MG/1; MG/1
1 TABLET ORAL ONCE
Status: COMPLETED | OUTPATIENT
Start: 2024-11-11 | End: 2024-11-11

## 2024-11-11 RX ADMIN — OXYCODONE HYDROCHLORIDE AND ACETAMINOPHEN 1 TABLET: 5; 325 TABLET ORAL at 15:47

## 2024-11-11 RX ADMIN — IBUPROFEN 600 MG: 600 TABLET, FILM COATED ORAL at 15:47

## 2024-11-11 RX ADMIN — AMOXICILLIN AND CLAVULANATE POTASSIUM 1 TABLET: 875; 125 TABLET, FILM COATED ORAL at 15:47

## 2024-11-11 ASSESSMENT — FIBROSIS 4 INDEX: FIB4 SCORE: 0.64

## 2024-11-11 ASSESSMENT — PAIN DESCRIPTION - PAIN TYPE: TYPE: CHRONIC PAIN

## 2024-11-11 NOTE — ED PROVIDER NOTES
ED Provider Note    CHIEF COMPLAINT  Chief Complaint   Patient presents with    Facial Injury     PT presents d/t right sided mastoid effusion. PT was diagnosed with this last week and was prescribed steroids for same, but states that there has been no improvement, the pain has spread to the neck ear and face, and she is having foul tasting sinus drainage.        EXTERNAL RECORDS REVIEWED  Outpatient Notes reviewed recent [facility] visit for viral gastritis on November 4    HPI/ROS  LIMITATION TO HISTORY   No    Kristin Balderrama is a 34 y.o. female who presents stating that she was seen recently in the [facility] back on November 5 and was diagnosed with mastoiditis but was placed on steroids and not antibiotics.  She says that she has had little to no improvement and seems to be a little bit worsening now and has some sinus drainage that he she is detecting on the right side and her right sided face is starting to feel funny like she has had a Botox injection.  She states that imaging was performed at [facility] and they determined she had mastoiditis without's abscess    PAST MEDICAL HISTORY   has a past medical history of Abdominal pain, Anginal syndrome, Apnea, sleep, Arthritis, ASTHMA, Back pain, Borderline personality disorder ([facility]), Chickenpox, Chronic UTI (09/18/2010), Daytime sleepiness, Dental disorder (03/08/2021), Depression, Diabetes ([facility]), Diarrhea, Disorder of thyroid, Fatigue, Frequent headaches, Heart burn, History of falling, Inappropriate sinus tachycardia ([facility]) (2016), Migraine, Mitochondrial disease (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), Obesity, Pain, Painful joint, PCOS (polycystic ovarian syndrome), Pneumonia (2012 12/2020), POTS (postural orthostatic tachycardia syndrome), Psychosis ([facility]), Ringing in ears, Scoliosis, Shortness of breath, Sinus tachycardia (10/31/2013), Sleep apnea, Snoring, Supraventricular tachycardia ([facility])  (04/10/2019), Transverse myelitis (HCC), Tuberculosis, Urinary bladder disorder, Urinary incontinence, Weakness, and Wears glasses.    SURGICAL HISTORY   has a past surgical history that includes neuro dest facet l/s w/ig sngl (04/21/2015); recovery (01/27/2016); [place] by laparoscopy (08/29/2010); lumbar fusion anterior (08/21/2012); other cardiac surgery (01/2016); tonsillectomy; bowel stimulator insertion (07/13/2016); gastroscopy with balloon dilatation (N/A, 01/04/2017); muscle biopsy (Right, 01/26/2017); other abdominal surgery; laminotomy; bowel stimulator insertion (03/10/2021); lap,diagnostic abdomen (02/14/2022); ovarian cystectomy (Right, 02/14/2022); percut fix prox/neck femur fx (Left, 01/28/2023); inguinal hernia laparoscopic (Right, 07/21/2023); hernia repair (Right, 07/21/2023); cystoscopy,insert ureteral stent (Right, 2/12/2024); cysto/uretero/pyeloscopy, dx (Right, 2/12/2024); and lasertripsy (Right, 2/12/2024).    FAMILY HISTORY  Family History   Problem Relation Age of Onset    Hypertension Mother     Sleep Apnea Mother     Heart Disease Mother     Other Mother         hypothryod    No Known Problems Sister     Other Sister         Narcolepsy;fibromyalsia;bone;nerve    Genitourinary () Problems Sister         endometriosis    Hypertension Maternal Uncle     Heart Disease Maternal Grandmother     Hypertension Maternal Grandmother     Cancer Neg Hx        SOCIAL HISTORY  Social History     Tobacco Use    Smoking status: Never    Smokeless tobacco: Never   Vaping Use    Vaping status: Never Used   Substance and Sexual Activity    Alcohol use: No     Alcohol/week: 0.0 oz    Drug use: Never    Sexual activity: Not Currently     Birth control/protection: Implant       CURRENT MEDICATIONS  Home Medications       Reviewed by Abhishek Fried R.N. (Registered Nurse) on 11/11/24 at 1303  Med List Status: Not Addressed     Medication Last Dose Status   adalimumab (HUMIRA) 80 MG/0.8ML injection  Active    albuterol (PROVENTIL) 2.5mg/3ml Nebu Soln solution for nebulization  Active   amLODIPine (NORVASC) 5 MG Tab  Active   azithromycin (ZITHROMAX) 250 MG Tab  Active   cephALEXin (KEFLEX) 500 MG Cap  Active   ciprofloxacin/dexamethasone (CIPRODEX) 0.3-0.1 % Suspension  Active   clindamycin 1 % Gel  Active   diphenhydrAMINE (BENADRYL) 25 MG Tab  Active   doxycycline (VIBRAMYCIN) 100 MG Cap  Active   gabapentin (NEURONTIN) 400 MG Cap  Active   gabapentin (NEURONTIN) 400 MG Cap  Active   Galcanezumab-gnlm (EMGALITY) 120 MG/ML Solution Auto-injector  Active   ibuprofen (MOTRIN) 800 MG Tab  Active   ipratropium-albuterol (COMBIVENT RESPIMAT)  MCG/ACT Aero Soln  Active   ivabradine (CORLANOR) 7.5 MG Tab tablet  Active   ivabradine (CORLANOR) 7.5 MG Tab tablet  Active   ketoconazole (NIZORAL) 2 % Cream  Active   lamoTRIgine (LAMICTAL) 25 MG Tab  Active   loperamide (IMODIUM) 2 MG Cap  Active   LORazepam (ATIVAN) 1 MG Tab  Active   Melatonin 10 MG Tab  Active   metoprolol SR (TOPROL XL) 25 MG TABLET SR 24 HR  Active   naproxen (NAPROSYN) 500 MG Tab  Active   ofloxacin (OCUFLOX) 0.3 % Solution  Active   omeprazole (PRILOSEC) 20 MG delayed-release capsule  Active   ondansetron (ZOFRAN ODT) 4 MG TABLET DISPERSIBLE  Active   oxyCODONE immediate-release (ROXICODONE) 5 MG Tab  Active   oxyCODONE-acetaminophen (PERCOCET) 5-325 MG Tab  Active   phenazopyridine (PYRIDIUM) 200 MG Tab  Active   polymixin-trimethoprim (POLYTRIM) 93004-7.1 UNIT/ML-% Solution  Active   predniSONE (DELTASONE) 10 MG Tab  Active   predniSONE (DELTASONE) 20 MG Tab  Active   prochlorperazine (COMPAZINE) 10 MG Tab  Active   Rimegepant Sulfate (NURTEC) 75 MG TABLET DISPERSIBLE  Active   scopolamine (TRANSDERM SCOP, 1.5 MG,) 1 mg/72hr PATCH 72 HR  Active   spironolactone (ALDACTONE) 25 MG Tab  Active   spironolactone (ALDACTONE) 50 MG Tab  Active   sulfamethoxazole-trimethoprim (BACTRIM DS) 800-160 MG tablet  Active   sumatriptan (IMITREX) 20 MG/ACT nasal  "spray  Active   tizanidine (ZANAFLEX) 4 MG capsule  Active   tizanidine (ZANAFLEX) 4 MG capsule  Active   tizanidine (ZANAFLEX) 4 MG Tab  Active   topiramate (TOPAMAX) 50 MG tablet  Active   traMADol (ULTRAM) 50 MG Tab  Active   ziprasidone (GEODON) 40 MG Cap  Active                    ALLERGIES  Allergies   Allergen Reactions    Cefdinir Shortness of Breath and Itching     Tolerated 1/18/17  Tolerates ceftriaxone  Tolerated augmentin 8/2019     Depakote [Divalproex Sodium] Unspecified     Muscle spasms/muscle pain and weakness      Doxycycline Anaphylaxis and Vomiting     Other reaction(s): pustules/blisters  Other reaction(s): stomach pain    Montelukast [Singulair] Unspecified     Cardiac effusion    Vancomycin Itching     Pt becomes flushed in face and chest.   RXN=7/10/16    Wound Dressing Adhesive Rash     By pt report-\"removes skin totally off\"    Amitriptyline Unspecified     Headaches      Amoxicillin Rash          Aripiprazole [Abilify] Unspecified     Headaches/muscle twitching      Clindamycin Nausea         Other reaction(s): nausea stomach pain    Erythromycin Rash     .  Other reaction(s): nausea stomach pain    Flomax [Tamsulosin Hydrochloride] Swelling    Hydromorphone      Other reaction(s): vomiting    Levaquin Unspecified     Severe muscle cramps in legs  RXN=unknown  Other reaction(s): leg muscle cramps    Metformin Unspecified     Increased lactic acid     Other reaction(s): itching and rash/nausea vomiting    Sulfa Drugs Hives, Itching, Myalgia and Unspecified     Muscle pain and weakness    Other reaction(s): unknown    Tamsulosin Swelling     Swelling of legs    Tape Rash     Tears skin off  coban with Tegaderm tape ok intermittently  RXN=ongoing    Sulfamethoxazole W-Trimethoprim Rash    Ciprofloxacin Rash          Keflex Rash     Pt states she gets a rash with this medication  Tolerates ceftriaxone  Can take with Benadryl    Levofloxacin Unspecified     Leg muscle cramps    Metronidazole " "Rash     \"Vision problems\"  Other reaction(s): vision problems       PHYSICAL EXAM  VITAL SIGNS: BP (!) 144/82   Pulse 67   Temp 36.8 °C (98.3 °F) (Temporal)   Resp 18   Ht 1.626 m (5' 4\")   Wt 119 kg (263 lb 3.7 oz)   SpO2 98%   BMI 45.18 kg/m²    Constitutional: Well developed, Well nourished, No acute distress, uncomfortable appearance.   HENT: Normocephalic, Atraumatic, Bilateral external ears normal, Oropharynx is clear mucous membranes are moist. No oral exudates or nasal discharge.  Tender in the sub auricular area on the right especially over the mastoid process with some edema and no evidence of abscess  Eyes: Pupils are equal round and reactive, EOMI, Conjunctiva normal, No discharge.   Neck: Normal range of motion, No tenderness, Supple, No stridor. No meningismus.  Lymphatic: No lymphadenopathy noted.   Cardiovascular: Regular rate and rhythm without murmur rub or gallop.  Thorax & Lungs: Clear breath sounds bilaterally without wheezes, rhonchi or rales. There is no chest wall tenderness.   Abdomen: Soft non-tender non-distended. There is no rebound or guarding. No organomegaly is appreciated. Bowel sounds are normal.  Skin: Mild periauricular erythema on the right side without abscess.   Back: No CVA or spinal tenderness.   Extremities: Intact distal pulses, No edema, No tenderness, No cyanosis, No clubbing. Capillary refill is less than 2 seconds.  Musculoskeletal: Good range of motion in all major joints. No tenderness to palpation or major deformities noted.   Neurologic: Alert & oriented x 3, Normal motor function, Normal sensory function, No focal deficits noted. Reflexes are normal.  Psychiatric: Affect normal, Judgment normal, Mood normal. There is no suicidal ideation or patient reported hallucinations.         COURSE & MEDICAL DECISION MAKING    ASSESSMENT, COURSE AND PLAN  Care Narrative: I do have a high suspicion for mastoiditis and attempted to get imaging from [facility] to confirm.  She said that imaging was done on November 5 and I think we will treat clinically given my high suspicion and her report of a diagnosis of mastoiditis.  I do not understand why she was given just steroids and not antibiotics    She was given Augmentin here after review of her extensive allergy profile as she tolerated Augmentin in the past without issue or reaction.  She is given also a dose of pain medication prior to discharge in stable condition will return if any significant change in symptoms      DISPOSITION AND DISCUSSIONS  Escalation of care considered, and ultimately not performed:diagnostic imaging.  We will avoid CT scan to confirm as clinically she does have mastoiditis and I doubt abscess      Decision tools and prescription drugs considered including, but not limited to: Pain Medications will use OTC medications instead of opiates and give her Augmentin after review of her allergy profile .    FINAL DIAGNOSIS  1. Mastoiditis of right side         Electronically signed by: Robin aSnchez M.D., 11/11/2024 2:55 PM

## 2024-11-11 NOTE — DISCHARGE INSTRUCTIONS
Please take Augmentin as directed and use ibuprofen and or Tylenol for pain control and follow-up with your primary doctor in about a week

## 2024-11-11 NOTE — OP THERAPY DAILY TREATMENT
Outpatient Occupational Therapy  DAILY TREATMENT     [facility] Occupational Therapy [address]  [address]  [place] [address]  Phone:  [phone]  Fax:  [phone]    Date: 11/12/2024    Patient: Kristin Balderrama  YOB: 1990  MRN: [id number]     Time Calculation  Start time: 1015  Stop time: 1100 Time Calculation (min): 45 minutes         Chief Complaint: Extremity Weakness, Self Care Duties, and Loss Of Balance    Visit #: 5    SUBJECTIVE:  I was in ED yesterday and was diagnosed  with mastoiditis and has been placed on antibiotics.      OBJECTIVE:  Current objective measures: Postural Observations  Seated posture: fair  Standing posture: fair  Correction of posture: makes symptoms better        Neurological Testing   Sensation   Wrist/Hand   Left   Hypersensation: light touch   Right   Hypersensation: light touch     Bilateral hand hypersensitivity; especially with hot and cold  Quick response to therapist tapping hands.       Active Range of Motion   Normal active range of motion for all right UE except shoulder     Right Shoulder   Flexion: 180 degrees   Extension: WFL  Abduction: 1400 degrees   Adduction: WFL  External rotation 0°: WFL     Additional Active Range of Motion Details  Patient stated she has had a right rotator cuff tear in the past   Some hypermobility noted; however quite minimal on examination      Scapular Mobility   Left Shoulder   Scapular Mobility with Shoulder to 90° FF   Scapular winging: minimal     Right Shoulder   Scapular Mobility with Shoulder to 90° FF   Scapular winging: minimal     Strength      Left Wrist/Hand    (2nd hand position)     Trial 1: 62     Right Wrist/Hand    (2nd hand position)     Trial 1: 60     Tests      Left Wrist/Hand   Negative CMC grind, distal radial-ulnar joint stress, extrinsic extensor tightness and extrinsic flexor tightness.      Right Wrist/Hand   Negative CMC grind, distal radial-ulnar joint stress,  extrinsic extensor tightness and extrinsic flexor tightness.      Left Elbow   Negative Cozen's.      Right Elbow   Negative Cozen's.      General Comments      Wrist/Hand Comments  9 hole peg test:  R=18 seconds  L = 19 seconds      Activities of Daily Living:      Bathing:      Bathing comments:   Unable to tolerate standing in the small step in shower cubicle and tending to sponge bathe.     Dressing:     Lower body adaptive equipment used: long shoe horn, reacher, dressing stick and sock aid     Patient wanting to utilize her Body Braid to stabilize her joints during ADLs and currently is Mod I with UB and max A with lower body.       ADL Comments:  Uses w/c on occasion (3-4 x a year).  Mod I dressing.   Small step in shower. Difficult to use so normally sponge bathes  Has a commode which OTR recommended she use over the toilet as a toilet frame to assist with toilet transfers.    Unable to stand more than 5 minutes at a counter during a functional task     Improving sleep hygiene = able to get about 5 hours continuous sleep  Labored breathing, often holding breath during functional tasks at  time of evaluation.        Therapeutic Exercises (CPT 03294):     1. AROM of right shoulder    2. AAROM of right shoulder    3. Shoulder arc to enhance shouler AROM    4. medium resistance theraband for gross UE and upper back strengthening    5. medium resistance theraputty for hand strengthening    6. flex bar - green - firm    7. 11# graded clip, 10 x each hand    8. 9# digi-flex, 10 x each hand    9. fine motor manipulation and dexterity, 9 hole peg test, grooved pegboard, manipulation of coins and use of screwdriver and tweezers    Therapeutic Exercise Summary:         Therapeutic Treatments and Modalities:    1. Therapeutic Activities (CPT 55193), energy conservation, diaphragmatic breathing, sleep hygiene    Therapeutic Treatments and Modalities Summary: Sleep hygiene:  patient stated she is now managing 5 hours of  continuous sleep.  Only taking about 30 minutes to fall asleep   Energy conservation:  getting better at utilizing her calendar to organize her day so not to overdo it.  Seated while folding laundry, sitting to dress and sponge bathing.  Using AE for lower body dressing so less effort utilized.  Incorporated diaphragmatic techniques during therapy session today and at home.      Time-based treatments/modalities:  Therapeutic exercise minutes (CPT 90249): 30 minutes  Therapeutic activity minutes (CPT 98784): 15 minutes        Pain rating before treatment: 8  Pain rating after treatment: 7  Right side of jaw due to mastoiditis.    ASSESSMENT:   Response to treatment: improved AROM of right shoulder and improving sleep pattern. To bring body brace back in to work on with therapist.       PLAN/RECOMMENDATIONS:   Plan for treatment: therapy treatment to continue next visit.  Planned interventions for next visit: continue with current treatment, hot/cold pack therapy (CPT 85016), self care ADL training (CPT 63250), therapeutic activities (CPT 24220), and therapeutic exercise (CPT 13184)

## 2024-11-11 NOTE — ED TRIAGE NOTES
"Chief Complaint   Patient presents with    Facial Injury     PT presents d/t right sided mastoid effusion. PT was diagnosed with this last week and was prescribed steroids for same, but states that there has been no improvement, the pain has spread to the neck ear and face, and she is having foul tasting sinus drainage.      BP (!) 144/82   Pulse 67   Temp 36.8 °C (98.3 °F) (Temporal)   Resp 18   Ht 1.626 m (5' 4\")   Wt 119 kg (263 lb 3.7 oz)   SpO2 98%   BMI 45.18 kg/m²     "

## 2024-11-12 ENCOUNTER — PHARMACY VISIT (OUTPATIENT)
Dept: PHARMACY | Facility: MEDICAL CENTER | Age: 34
End: 2024-11-12

## 2024-11-12 ENCOUNTER — OCCUPATIONAL THERAPY (OUTPATIENT)
Dept: OCCUPATIONAL THERAPY | Facility: REHABILITATION | Age: 34
End: 2024-11-12
Attending: STUDENT IN AN ORGANIZED HEALTH CARE EDUCATION/TRAINING PROGRAM
Payer: MEDICARE

## 2024-11-12 DIAGNOSIS — M25.549 ARTHRALGIA OF METACARPOPHALANGEAL JOINT, UNSPECIFIED LATERALITY: ICD-10-CM

## 2024-11-12 DIAGNOSIS — Q79.60 EHLERS-DANLOS SYNDROME: ICD-10-CM

## 2024-11-12 PROCEDURE — 97530 THERAPEUTIC ACTIVITIES: CPT

## 2024-11-12 PROCEDURE — 97110 THERAPEUTIC EXERCISES: CPT

## 2024-11-14 ENCOUNTER — OCCUPATIONAL THERAPY (OUTPATIENT)
Dept: OCCUPATIONAL THERAPY | Facility: REHABILITATION | Age: 34
End: 2024-11-14
Attending: STUDENT IN AN ORGANIZED HEALTH CARE EDUCATION/TRAINING PROGRAM
Payer: MEDICARE

## 2024-11-14 DIAGNOSIS — M25.549 ARTHRALGIA OF METACARPOPHALANGEAL JOINT, UNSPECIFIED LATERALITY: ICD-10-CM

## 2024-11-14 DIAGNOSIS — Q79.60 EHLERS-DANLOS SYNDROME: ICD-10-CM

## 2024-11-14 PROCEDURE — 97110 THERAPEUTIC EXERCISES: CPT

## 2024-11-14 PROCEDURE — 97530 THERAPEUTIC ACTIVITIES: CPT

## 2024-11-14 PROCEDURE — RXMED WILLOW AMBULATORY MEDICATION CHARGE: Performed by: NURSE PRACTITIONER

## 2024-11-14 NOTE — OP THERAPY DAILY TREATMENT
Outpatient Occupational Therapy  DAILY TREATMENT     [facility] Occupational Therapy [address].  [address]  [place] [address]  Phone:  [phone]  Fax:  [phone]    Date: 11/14/2024    Patient: Kristin Balderrama  YOB: 1990  MRN: [id number]     Time Calculation  Start time: 0100  Stop time: 0145 Time Calculation (min): 45 minutes         Chief Complaint: Extremity Weakness and Self Care Duties    Visit #: 6    SUBJECTIVE:  I just saw my chiropractor and I am feeling really good today    OBJECTIVE:  Current objective measures:    Postural Observations  Seated posture: fair  Standing posture: fair  Correction of posture: makes symptoms better        Neurological Testing   Sensation   Wrist/Hand   Left   Hypersensation: light touch   Right   Hypersensation: light touch     Bilateral hand hypersensitivity; especially with hot and cold  Quick response to therapist tapping hands.       Active Range of Motion   Normal active range of motion for all right UE except shoulder     Right Shoulder   Flexion: 180 degrees   Extension: WFL  Abduction: 1400 degrees   Adduction: WFL  External rotation 0°: WFL     Additional Active Range of Motion Details  Patient stated she has had a right rotator cuff tear in the past   Some hypermobility noted; however quite minimal on examination      Scapular Mobility   Left Shoulder   Scapular Mobility with Shoulder to 90° FF   Scapular winging: minimal     Right Shoulder   Scapular Mobility with Shoulder to 90° FF   Scapular winging: minimal     Strength      Left Wrist/Hand    (2nd hand position)     Trial 1: 62     Right Wrist/Hand    (2nd hand position)     Trial 1: 60     Tests      Left Wrist/Hand   Negative CMC grind, distal radial-ulnar joint stress, extrinsic extensor tightness and extrinsic flexor tightness.      Right Wrist/Hand   Negative CMC grind, distal radial-ulnar joint stress, extrinsic extensor tightness and extrinsic flexor  tightness.      Left Elbow   Negative Cozen's.      Right Elbow   Negative Cozen's.      General Comments      Wrist/Hand Comments  9 hole peg test:  R=18 seconds  L = 19 seconds      Activities of Daily Living:      Bathing:      Bathing comments:   Unable to tolerate standing in the small step in shower cubicle and tending to sponge bathe.     Dressing:     Lower body adaptive equipment used: long shoe horn, reacher, dressing stick and sock aid     Patient wanting to utilize her Body Braid to stabilize her joints during ADLs and currently is Mod I with UB and max A with lower body.       ADL Comments:  Uses w/c on occasion (3-4 x a year).  Mod I dressing.   Small step in shower. Difficult to use so normally sponge bathes  Has a commode which OTR recommended she use over the toilet as a toilet frame to assist with toilet transfers.    Unable to stand more than 5 minutes at a counter during a functional task     Improving sleep hygiene = able to get about 5 hours continuous sleep  Labored breathing, often holding breath during functional tasks at  time of evaluation.        Therapeutic Exercises (CPT 53345):     1. AROM of right shoulder    2. AAROM of right shoulder    3. Shoulder arc to enhance shouler AROM    4. medium resistance theraband for gross UE and upper back strengthening    5. medium resistance theraputty for hand strengthening    6. flex bar - green - firm    7. 11# graded clip, 10 x each hand    8. 9# digi-flex, 10 x each hand    9. fine motor manipulation and dexterity, 9 hole peg test, grooved pegboard, manipulation of coins and use of screwdriver and tweezers    Therapeutic Exercise Summary:         Therapeutic Treatments and Modalities:    1. Therapeutic Activities (CPT 36870)    Therapeutic Treatments and Modalities Summary: Dynamic standing activity of lifting laundry basket of clothing from counter and carrying to elevated mat, folding laundry and placing back in the basket and carried back to  counter.  Then walked over to [facility] and completed reaching activity.  Total standing time of 9 minutes with therapeutic rest utilizing diaphragmatic breathing     Time-based treatments/modalities:  Therapeutic exercise minutes (CPT 99168): 30 minutes  Therapeutic activity minutes (CPT 70033): 15 minutes        Pain rating before treatment: 5  Pain rating after treatment: 5    ASSESSMENT:   Response to treatment: patient mobilizing independently today without an AD and improved standing tolerance during a functional task for just over 9 minutes     PLAN/RECOMMENDATIONS:   Plan for treatment: therapy treatment to continue next visit.  Planned interventions for next visit: continue with current treatment, hot/cold pack therapy (CPT 64617), self care ADL training (CPT 23727), therapeutic activities (CPT 18510), and therapeutic exercise (CPT 02722)

## 2024-11-18 ENCOUNTER — PHARMACY VISIT (OUTPATIENT)
Dept: PHARMACY | Facility: MEDICAL CENTER | Age: 34
End: 2024-11-18
Payer: COMMERCIAL

## 2024-11-19 ENCOUNTER — OCCUPATIONAL THERAPY (OUTPATIENT)
Dept: OCCUPATIONAL THERAPY | Facility: REHABILITATION | Age: 34
End: 2024-11-19
Attending: STUDENT IN AN ORGANIZED HEALTH CARE EDUCATION/TRAINING PROGRAM
Payer: MEDICARE

## 2024-11-19 DIAGNOSIS — Q79.60 EHLERS-DANLOS SYNDROME: ICD-10-CM

## 2024-11-19 DIAGNOSIS — M25.549 ARTHRALGIA OF METACARPOPHALANGEAL JOINT, UNSPECIFIED LATERALITY: ICD-10-CM

## 2024-11-19 PROCEDURE — 97530 THERAPEUTIC ACTIVITIES: CPT

## 2024-11-19 PROCEDURE — 97110 THERAPEUTIC EXERCISES: CPT

## 2024-11-21 ENCOUNTER — PHARMACY VISIT (OUTPATIENT)
Dept: PHARMACY | Facility: MEDICAL CENTER | Age: 34
End: 2024-11-21
Payer: COMMERCIAL

## 2024-11-21 ENCOUNTER — OCCUPATIONAL THERAPY (OUTPATIENT)
Dept: OCCUPATIONAL THERAPY | Facility: REHABILITATION | Age: 34
End: 2024-11-21
Attending: STUDENT IN AN ORGANIZED HEALTH CARE EDUCATION/TRAINING PROGRAM
Payer: MEDICARE

## 2024-11-21 DIAGNOSIS — Q79.60 EHLERS-DANLOS SYNDROME: ICD-10-CM

## 2024-11-21 DIAGNOSIS — M25.549 ARTHRALGIA OF METACARPOPHALANGEAL JOINT, UNSPECIFIED LATERALITY: ICD-10-CM

## 2024-11-21 PROCEDURE — RXMED WILLOW AMBULATORY MEDICATION CHARGE: Performed by: NURSE PRACTITIONER

## 2024-11-21 PROCEDURE — 97535 SELF CARE MNGMENT TRAINING: CPT

## 2024-11-21 PROCEDURE — 97110 THERAPEUTIC EXERCISES: CPT

## 2024-11-21 RX ORDER — NYSTATIN 100000 [USP'U]/G
POWDER TOPICAL
Qty: 30 G | Refills: 1 | OUTPATIENT
Start: 2024-11-21

## 2024-11-21 RX ORDER — FLUCONAZOLE 150 MG/1
TABLET ORAL
Qty: 1 TABLET | Refills: 0 | OUTPATIENT
Start: 2024-11-21

## 2024-11-21 NOTE — OP THERAPY DAILY TREATMENT
Outpatient Occupational Therapy  DAILY TREATMENT     [facility] Occupational [address].  [address]  [place] [address]  Phone:  [phone]  Fax:  [phone]    Date: 11/21/2024    Patient: Kristin Balderrama  YOB: 1990  MRN: [id number]     Time Calculation  Start time: 1245  Stop time: 1330 Time Calculation (min): 45 minutes         Chief Complaint: Loss Of Balance, Extremity Weakness, and Self Care Duties    Visit #: 8    SUBJECTIVE:  I feel the Chiropractor sessions are helping me loosen up     OBJECTIVE:  Current objective measures:    Postural Observations  Seated posture: fair  Standing posture: fair  Correction of posture: makes symptoms better        Neurological Testing   Sensation   Wrist/Hand   Left   Hypersensation: light touch   Right   Hypersensation: light touch     Bilateral hand hypersensitivity; especially with hot and cold  Quick response to therapist tapping hands.       Active Range of Motion   Normal active range of motion for all right UE except shoulder     Right Shoulder   Flexion: 180 degrees   Extension: WFL  Abduction: 1400 degrees   Adduction: WFL  External rotation 0°: WFL     Additional Active Range of Motion Details  Patient stated she has had a right rotator cuff tear in the past   Some hypermobility noted; however quite minimal on examination      Scapular Mobility   Left Shoulder   Scapular Mobility with Shoulder to 90° FF   Scapular winging: minimal     Right Shoulder   Scapular Mobility with Shoulder to 90° FF   Scapular winging: minimal     Strength      Left Wrist/Hand    (2nd hand position)     Trial 1: 62     Right Wrist/Hand    (2nd hand position)     Trial 1: 62     Tests      Left Wrist/Hand   Negative CMC grind, distal radial-ulnar joint stress, extrinsic extensor tightness and extrinsic flexor tightness.      Right Wrist/Hand   Negative CMC grind, distal radial-ulnar joint stress, extrinsic extensor tightness and  extrinsic flexor tightness.      Left Elbow   Negative Cozen's.      Right Elbow   Negative Cozen's.      General Comments      Wrist/Hand Comments  9 hole peg test:  R=18 seconds  L = 19 seconds      Activities of Daily Living:      Bathing:      Bathing comments:   Unable to tolerate standing in the small step in shower cubicle and tending to sponge bathe.     Dressing:     Lower body adaptive equipment used: long shoe horn, reacher, dressing stick and sock aid     Patient wanting to utilize her Body Braid to stabilize her joints during ADLs and currently is Mod I with UB and SBA/supervision with LB with moderate verbal cues       ADL Comments:  Uses w/c on occasion (3-4 x a year).  Mod I dressing.   Small step in shower. Difficult to use so normally sponge bathes  Has a commode which OTR recommended she use over the toilet as a toilet frame to assist with toilet transfers.    Unable to stand more than 13 minutes at a counter during a functional task     Improving sleep hygiene = able to get about 5 hours continuous sleep  Labored breathing, often holding breath during functional tasks at  time of evaluation.        Therapeutic Exercises (CPT 77546):     1. AROM of right shoulder    2. AAROM of right shoulder    3. Shoulder arc to enhance shouler AROM    4. medium resistance theraband for gross UE and upper back strengthening    5. medium resistance theraputty for hand strengthening    6. flex bar - green - firm    7. 11# graded clip, 10 x each hand    8. 9# digi-flex, 10 x each hand    9. fine motor manipulation and dexterity, manipulation of golf balls    Therapeutic Exercise Summary:         Therapeutic Treatments and Modalities:    1. Therapeutic Activities (CPT 44238)    Therapeutic Treatments and Modalities Summary: Dynamic standing activity of lifting laundry basket of clothing from counter and carrying to elevated mat, folding laundry and placing back in the basket and carried back to counter.  Then walked  over to [place] and completed reaching activity.  Total standing time of 13 minutes and 30 seconds  with therapeutic rest  at end and utilizing diaphragmatic breathing     Time-based treatments/modalities:  Therapeutic exercise minutes (CPT 07025): 30 minutes  Self-care/ADL training minutes (CPT 28280): 15 minutes        Pain rating before treatment: 4  Pain rating after treatment: 5    ASSESSMENT:   Response to treatment: Improved ability to ju[place] Body Braid today with no physical assistance and able to juanita in 14 minutes      PLAN/RECOMMENDATIONS:   Plan for treatment: therapy treatment to continue next visit.  Planned interventions for next visit: continue with current treatment

## 2024-11-23 ENCOUNTER — HOSPITAL ENCOUNTER (EMERGENCY)
Facility: MEDICAL CENTER | Age: 34
End: 2024-11-23
Attending: EMERGENCY MEDICINE
Payer: MEDICARE

## 2024-11-23 ENCOUNTER — PHARMACY VISIT (OUTPATIENT)
Dept: PHARMACY | Facility: MEDICAL CENTER | Age: 34
End: 2024-11-23
Payer: COMMERCIAL

## 2024-11-23 VITALS
RESPIRATION RATE: 19 BRPM | HEART RATE: 80 BPM | WEIGHT: 264.99 LBS | TEMPERATURE: 98 F | OXYGEN SATURATION: 99 % | BODY MASS INDEX: 45.49 KG/M2 | SYSTOLIC BLOOD PRESSURE: 134 MMHG | DIASTOLIC BLOOD PRESSURE: 80 MMHG

## 2024-11-23 DIAGNOSIS — R11.2 NAUSEA AND VOMITING, UNSPECIFIED VOMITING TYPE: ICD-10-CM

## 2024-11-23 DIAGNOSIS — B34.9 VIRAL ILLNESS: ICD-10-CM

## 2024-11-23 DIAGNOSIS — R10.13 EPIGASTRIC PAIN: ICD-10-CM

## 2024-11-23 DIAGNOSIS — E86.0 DEHYDRATION: ICD-10-CM

## 2024-11-23 LAB
ALBUMIN SERPL BCP-MCNC: 4.8 G/DL (ref 3.2–4.9)
ALBUMIN/GLOB SERPL: 1.9 G/DL
ALP SERPL-CCNC: 81 U/L (ref 30–99)
ALT SERPL-CCNC: 42 U/L (ref 2–50)
ANION GAP SERPL CALC-SCNC: 12 MMOL/L (ref 7–16)
APPEARANCE UR: CLEAR
AST SERPL-CCNC: 19 U/L (ref 12–45)
BASOPHILS # BLD AUTO: 0.5 % (ref 0–1.8)
BASOPHILS # BLD: 0.04 K/UL (ref 0–0.12)
BILIRUB SERPL-MCNC: 0.4 MG/DL (ref 0.1–1.5)
BILIRUB UR QL STRIP.AUTO: NEGATIVE
BUN SERPL-MCNC: 17 MG/DL (ref 8–22)
CALCIUM ALBUM COR SERPL-MCNC: 9.6 MG/DL (ref 8.5–10.5)
CALCIUM SERPL-MCNC: 10.2 MG/DL (ref 8.5–10.5)
CHLORIDE SERPL-SCNC: 107 MMOL/L (ref 96–112)
CO2 SERPL-SCNC: 19 MMOL/L (ref 20–33)
COLOR UR: YELLOW
CREAT SERPL-MCNC: 0.92 MG/DL (ref 0.5–1.4)
EOSINOPHIL # BLD AUTO: 0.12 K/UL (ref 0–0.51)
EOSINOPHIL NFR BLD: 1.4 % (ref 0–6.9)
ERYTHROCYTE [DISTWIDTH] IN BLOOD BY AUTOMATED COUNT: 41.8 FL (ref 35.9–50)
GFR SERPLBLD CREATININE-BSD FMLA CKD-EPI: 83 ML/MIN/1.73 M 2
GLOBULIN SER CALC-MCNC: 2.5 G/DL (ref 1.9–3.5)
GLUCOSE SERPL-MCNC: 115 MG/DL (ref 65–99)
GLUCOSE UR STRIP.AUTO-MCNC: NEGATIVE MG/DL
HCG SERPL QL: NEGATIVE
HCT VFR BLD AUTO: 44.2 % (ref 37–47)
HGB BLD-MCNC: 14.8 G/DL (ref 12–16)
IMM GRANULOCYTES # BLD AUTO: 0.05 K/UL (ref 0–0.11)
IMM GRANULOCYTES NFR BLD AUTO: 0.6 % (ref 0–0.9)
KETONES UR STRIP.AUTO-MCNC: NEGATIVE MG/DL
LEUKOCYTE ESTERASE UR QL STRIP.AUTO: NEGATIVE
LIPASE SERPL-CCNC: 37 U/L (ref 11–82)
LYMPHOCYTES # BLD AUTO: 1.37 K/UL (ref 1–4.8)
LYMPHOCYTES NFR BLD: 16.1 % (ref 22–41)
MCH RBC QN AUTO: 31.8 PG (ref 27–33)
MCHC RBC AUTO-ENTMCNC: 33.5 G/DL (ref 32.2–35.5)
MCV RBC AUTO: 94.8 FL (ref 81.4–97.8)
MICRO URNS: NORMAL
MONOCYTES # BLD AUTO: 0.4 K/UL (ref 0–0.85)
MONOCYTES NFR BLD AUTO: 4.7 % (ref 0–13.4)
NEUTROPHILS # BLD AUTO: 6.51 K/UL (ref 1.82–7.42)
NEUTROPHILS NFR BLD: 76.7 % (ref 44–72)
NITRITE UR QL STRIP.AUTO: NEGATIVE
NRBC # BLD AUTO: 0.02 K/UL
NRBC BLD-RTO: 0.2 /100 WBC (ref 0–0.2)
PH UR STRIP.AUTO: 7.5 [PH] (ref 5–8)
PLATELET # BLD AUTO: 183 K/UL (ref 164–446)
PMV BLD AUTO: 10.7 FL (ref 9–12.9)
POTASSIUM SERPL-SCNC: 4.5 MMOL/L (ref 3.6–5.5)
PROT SERPL-MCNC: 7.3 G/DL (ref 6–8.2)
PROT UR QL STRIP: NEGATIVE MG/DL
RBC # BLD AUTO: 4.66 M/UL (ref 4.2–5.4)
RBC UR QL AUTO: NEGATIVE
SODIUM SERPL-SCNC: 138 MMOL/L (ref 135–145)
SP GR UR STRIP.AUTO: 1.01
UROBILINOGEN UR STRIP.AUTO-MCNC: 0.2 EU/DL
WBC # BLD AUTO: 8.5 K/UL (ref 4.8–10.8)

## 2024-11-23 PROCEDURE — 96375 TX/PRO/DX INJ NEW DRUG ADDON: CPT

## 2024-11-23 PROCEDURE — 84703 CHORIONIC GONADOTROPIN ASSAY: CPT

## 2024-11-23 PROCEDURE — 80053 COMPREHEN METABOLIC PANEL: CPT

## 2024-11-23 PROCEDURE — 36415 COLL VENOUS BLD VENIPUNCTURE: CPT

## 2024-11-23 PROCEDURE — 83690 ASSAY OF LIPASE: CPT

## 2024-11-23 PROCEDURE — 81003 URINALYSIS AUTO W/O SCOPE: CPT

## 2024-11-23 PROCEDURE — 99285 EMERGENCY DEPT VISIT HI MDM: CPT

## 2024-11-23 PROCEDURE — 700111 HCHG RX REV CODE 636 W/ 250 OVERRIDE (IP): Mod: JZ,UD | Performed by: EMERGENCY MEDICINE

## 2024-11-23 PROCEDURE — 85025 COMPLETE CBC W/AUTO DIFF WBC: CPT

## 2024-11-23 PROCEDURE — 700105 HCHG RX REV CODE 258: Mod: UD | Performed by: EMERGENCY MEDICINE

## 2024-11-23 PROCEDURE — 96374 THER/PROPH/DIAG INJ IV PUSH: CPT

## 2024-11-23 PROCEDURE — RXMED WILLOW AMBULATORY MEDICATION CHARGE: Performed by: EMERGENCY MEDICINE

## 2024-11-23 RX ORDER — PROCHLORPERAZINE EDISYLATE 5 MG/ML
10 INJECTION INTRAMUSCULAR; INTRAVENOUS ONCE
Status: COMPLETED | OUTPATIENT
Start: 2024-11-23 | End: 2024-11-23

## 2024-11-23 RX ORDER — SUCRALFATE 1 G/1
1 TABLET ORAL
Qty: 42 TABLET | Refills: 0 | Status: SHIPPED | OUTPATIENT
Start: 2024-11-23 | End: 2024-12-07

## 2024-11-23 RX ORDER — SODIUM CHLORIDE 9 MG/ML
1000 INJECTION, SOLUTION INTRAVENOUS ONCE
Status: COMPLETED | OUTPATIENT
Start: 2024-11-23 | End: 2024-11-23

## 2024-11-23 RX ORDER — DIPHENHYDRAMINE HYDROCHLORIDE 50 MG/ML
25 INJECTION INTRAMUSCULAR; INTRAVENOUS ONCE
Status: COMPLETED | OUTPATIENT
Start: 2024-11-23 | End: 2024-11-23

## 2024-11-23 RX ORDER — DICYCLOMINE HCL 20 MG
20 TABLET ORAL EVERY 6 HOURS
Qty: 40 TABLET | Refills: 0 | Status: SHIPPED | OUTPATIENT
Start: 2024-11-23 | End: 2024-12-03

## 2024-11-23 RX ORDER — PROMETHAZINE HYDROCHLORIDE 25 MG/1
25 SUPPOSITORY RECTAL EVERY 6 HOURS PRN
Qty: 12 SUPPOSITORY | Refills: 0 | Status: SHIPPED | OUTPATIENT
Start: 2024-11-23 | End: 2024-11-27

## 2024-11-23 RX ADMIN — DIPHENHYDRAMINE HYDROCHLORIDE 25 MG: 50 INJECTION, SOLUTION INTRAMUSCULAR; INTRAVENOUS at 14:23

## 2024-11-23 RX ADMIN — PROCHLORPERAZINE EDISYLATE 10 MG: 5 INJECTION INTRAMUSCULAR; INTRAVENOUS at 14:23

## 2024-11-23 RX ADMIN — SODIUM CHLORIDE 1000 ML: 9 INJECTION, SOLUTION INTRAVENOUS at 14:22

## 2024-11-23 ASSESSMENT — FIBROSIS 4 INDEX: FIB4 SCORE: 0.64

## 2024-11-23 NOTE — ED TRIAGE NOTES
"Chief Complaint   Patient presents with    Nausea/Vomiting/Diarrhea     Pt reports that she is unable to keep anything down. States that her urine \"smelled like rotten eggs\" a few days ago. States stool is \"loose.\"  Provided cup for urine and clean catch instructions.   /83   Pulse 83   Temp 36.2 °C (97.2 °F) (Temporal)   Resp 18   Wt 120 kg (264 lb 15.9 oz)   SpO2 98%   Pt informed of wait times. Educated on triage process.  Asked to return to triage RN for any new or worsening of symptoms. Thanked for patience.      "

## 2024-11-23 NOTE — ED PROVIDER NOTES
ED Provider Note    CHIEF COMPLAINT  Chief Complaint   Patient presents with    Nausea/Vomiting/Diarrhea     EXTERNAL RECORDS REVIEWED  Prior diagnosis of viral gastritis on 11/4.  Seen on 11/11 for facial injury and sinus drainage and congestion.  Most recent encounter at the patient had high clinical suspicion for mastoiditis, started on Augmentin as she has an extensive allergy profile.  They have reviewed the prior imaging and did not feel that acute CT imaging need to be repeated.  Started on antibiotics for presumed mastoiditis of the right side.    HPI/ROS  LIMITATION TO HISTORY   Select: : None  OUTSIDE HISTORIAN(S):  none    Kristin Balderrama is a 34 y.o. female who presents to the emergency room for evaluation of upper abdominal discomfort, multiple episodes of acute nausea vomiting and generalized feelings of unwellness over the course of the last several weeks.  She states that she had sudden onset acute upper abdominal discomfort about 3 hours ago that was then followed by 6-7 episodes of nonbilious nonbloody emesis.  She has had dark foul-smelling urine during this time, says that she is not having acute pain, she has loose stools.  Does believe that her recent viral illness and congestion has somewhat improved, she had finished her Augmentin dosing recently and does not attribute the initiation of that antibiotic with any abdominal pains or discomfort.  She is currently having pain that 4 out of 10 in intensity primarily epigastric region    PAST MEDICAL HISTORY   has a past medical history of Abdominal pain, Anginal syndrome, Apnea, sleep, Arthritis, ASTHMA, Back pain, Borderline personality disorder ([facility]), Chickenpox, Chronic UTI (09/18/2010), Daytime sleepiness, Dental disorder (03/08/2021), Depression, Diabetes ([facility]), Diarrhea, Disorder of thyroid, Fatigue, Frequent headaches, Heart burn, History of falling, Inappropriate sinus tachycardia ([facility]) (2016), Migraine, Mitochondrial disease ([facility]),  Multiple personality disorder (HCC), Obesity, Pain, Painful joint, PCOS (polycystic ovarian syndrome), Pneumonia (2012 12/2020), POTS (postural orthostatic tachycardia syndrome), Psychosis (HCC), Ringing in ears, Scoliosis, Shortness of breath, Sinus tachycardia (10/31/2013), Sleep apnea, Snoring, Supraventricular tachycardia (HCC) (04/10/2019), Transverse myelitis (HCC), Tuberculosis, Urinary bladder disorder, Urinary incontinence, Weakness, and Wears glasses.    SURGICAL HISTORY   has a past surgical history that includes neuro dest facet l/s w/ig sngl (04/21/2015); recovery (01/27/2016); [place] by laparoscopy (08/29/2010); lumbar fusion anterior (08/21/2012); other cardiac surgery (01/2016); tonsillectomy; bowel stimulator insertion (07/13/2016); gastroscopy with balloon dilatation (N/A, 01/04/2017); muscle biopsy (Right, 01/26/2017); other abdominal surgery; laminotomy; bowel stimulator insertion (03/10/2021); lap,diagnostic abdomen (02/14/2022); ovarian cystectomy (Right, 02/14/2022); percut fix prox/neck femur fx (Left, 01/28/2023); inguinal hernia laparoscopic (Right, 07/21/2023); hernia repair (Right, 07/21/2023); cystoscopy,insert ureteral stent (Right, 2/12/2024); cysto/uretero/pyeloscopy, dx (Right, 2/12/2024); and lasertripsy (Right, 2/12/2024).    FAMILY HISTORY  Family History   Problem Relation Age of Onset    Hypertension Mother     Sleep Apnea Mother     Heart Disease Mother     Other Mother         hypothryod    No Known Problems Sister     Other Sister         Narcolepsy;fibromyalsia;bone;nerve    Genitourinary () Problems Sister         endometriosis    Hypertension Maternal Uncle     Heart Disease Maternal Grandmother     Hypertension Maternal Grandmother     Cancer Neg Hx        SOCIAL HISTORY  Social History     Tobacco Use    Smoking status: Never    Smokeless tobacco: Never   Vaping Use    Vaping status: Never Used   Substance and Sexual Activity    Alcohol use: No     Alcohol/week: 0.0 oz     Drug use: Never    Sexual activity: Not Currently     Birth control/protection: Implant       CURRENT MEDICATIONS  Home Medications       Reviewed by Consuelo Cabezas R.N. (Registered Nurse) on 11/23/24 at 1254  Med List Status: Partial     Medication Last Dose Status   adalimumab (HUMIRA) 80 MG/0.8ML injection  Active   albuterol (PROVENTIL) 2.5mg/3ml Nebu Soln solution for nebulization  Active   amLODIPine (NORVASC) 5 MG Tab  Active   azithromycin (ZITHROMAX) 250 MG Tab  Active   cephALEXin (KEFLEX) 500 MG Cap  Active   ciprofloxacin/dexamethasone (CIPRODEX) 0.3-0.1 % Suspension  Active   clindamycin 1 % Gel  Active   diphenhydrAMINE (BENADRYL) 25 MG Tab  Active   doxycycline (VIBRAMYCIN) 100 MG Cap  Active   fluconazole (DIFLUCAN) 150 MG tablet  Active   gabapentin (NEURONTIN) 400 MG Cap  Active   gabapentin (NEURONTIN) 400 MG Cap  Active   Galcanezumab-gnlm (EMGALITY) 120 MG/ML Solution Auto-injector  Active   ibuprofen (MOTRIN) 800 MG Tab  Active   ipratropium-albuterol (COMBIVENT RESPIMAT)  MCG/ACT Aero Soln  Active   ivabradine (CORLANOR) 7.5 MG Tab tablet  Active   ivabradine (CORLANOR) 7.5 MG Tab tablet  Active   ketoconazole (NIZORAL) 2 % Cream  Active   lamoTRIgine (LAMICTAL) 25 MG Tab  Active   loperamide (IMODIUM) 2 MG Cap  Active   LORazepam (ATIVAN) 1 MG Tab  Active   Melatonin 10 MG Tab  Active   metoprolol SR (TOPROL XL) 25 MG TABLET SR 24 HR  Active   naproxen (NAPROSYN) 500 MG Tab  Active   nystatin (MYCOSTATIN) powder  Active   ofloxacin (OCUFLOX) 0.3 % Solution  Active   omeprazole (PRILOSEC) 20 MG delayed-release capsule  Active   ondansetron (ZOFRAN ODT) 4 MG TABLET DISPERSIBLE  Active   oxyCODONE immediate-release (ROXICODONE) 5 MG Tab  Active   oxyCODONE-acetaminophen (PERCOCET) 5-325 MG Tab  Active   phenazopyridine (PYRIDIUM) 200 MG Tab  Active   polymixin-trimethoprim (POLYTRIM) 95851-6.1 UNIT/ML-% Solution  Active   predniSONE (DELTASONE) 10 MG Tab  Active   predniSONE  "(DELTASONE) 20 MG Tab  Active   prochlorperazine (COMPAZINE) 10 MG Tab  Active   Rimegepant Sulfate (NURTEC) 75 MG TABLET DISPERSIBLE  Active   scopolamine (TRANSDERM SCOP, 1.5 MG,) 1 mg/72hr PATCH 72 HR  Active   spironolactone (ALDACTONE) 25 MG Tab  Active   spironolactone (ALDACTONE) 50 MG Tab  Active   sulfamethoxazole-trimethoprim (BACTRIM DS) 800-160 MG tablet  Active   sumatriptan (IMITREX) 20 MG/ACT nasal spray  Active   tizanidine (ZANAFLEX) 4 MG capsule  Active   tizanidine (ZANAFLEX) 4 MG capsule  Active   tizanidine (ZANAFLEX) 4 MG Tab  Active   topiramate (TOPAMAX) 50 MG tablet  Active   traMADol (ULTRAM) 50 MG Tab  Active   ziprasidone (GEODON) 40 MG Cap  Active                  ALLERGIES  Allergies   Allergen Reactions    Cefdinir Shortness of Breath and Itching     Tolerated 1/18/17  Tolerates ceftriaxone  Tolerated augmentin 8/2019     Depakote [Divalproex Sodium] Unspecified     Muscle spasms/muscle pain and weakness      Doxycycline Anaphylaxis and Vomiting     Other reaction(s): pustules/blisters  Other reaction(s): stomach pain    Montelukast [Singulair] Unspecified     Cardiac effusion    Vancomycin Itching     Pt becomes flushed in face and chest.   RXN=7/10/16    Wound Dressing Adhesive Rash     By pt report-\"removes skin totally off\"    Amitriptyline Unspecified     Headaches      Amoxicillin Rash      Tolerates augmentin    Aripiprazole [Abilify] Unspecified     Headaches/muscle twitching      Clindamycin Nausea         Other reaction(s): nausea stomach pain    Erythromycin Rash     .  Other reaction(s): nausea stomach pain    Flomax [Tamsulosin Hydrochloride] Swelling    Hydromorphone      Other reaction(s): vomiting    Levaquin Unspecified     Severe muscle cramps in legs  RXN=unknown  Other reaction(s): leg muscle cramps    Metformin Unspecified     Increased lactic acid     Other reaction(s): itching and rash/nausea vomiting    Sulfa Drugs Hives, Itching, Myalgia and Unspecified     " "Muscle pain and weakness    Other reaction(s): unknown    Tamsulosin Swelling     Swelling of legs    Tape Rash     Tears skin off  coban with Tegaderm tape ok intermittently  RXN=ongoing    Sulfamethoxazole W-Trimethoprim Rash    Ciprofloxacin Rash          Keflex Rash     Pt states she gets a rash with this medication  Tolerates ceftriaxone  Can take with Benadryl    Levofloxacin Unspecified     Leg muscle cramps    Metronidazole Rash     \"Vision problems\"  Other reaction(s): vision problems     PHYSICAL EXAM  VITAL SIGNS: /80   Pulse 80   Temp 36.7 °C (98 °F)   Resp 19   Wt 120 kg (264 lb 15.9 oz)   SpO2 99%   BMI 45.49 kg/m²    Genl: F sitting in gurney uncomfortably, speaking clearly, appears in moderate distress   Head: NC/AT   ENT: Mucous membranes dry, posterior pharynx clear, uvula midline, nares patent bilaterally   Eyes: Normal sclera, pupils equal round reactive to light  Neck: Supple, FROM, no LAD appreciated   Pulmonary: Lungs are clear to auscultation bilaterally  Chest: No TTP  CV:  RRR, no murmur appreciated, pulses 2+ in both upper and lower extremities,  Abdomen: soft, obese, epigastric discomfort, no murphys sign, no mcburneys point tenderness, no focal tenderness on the flanks.  ND; no rebound/guarding, no masses palpated, no HSM though exam is limited due to habitus  : no CVA tenderness   Musculoskeletal: Pain free ROM of the neck. Moving upper and lower extremities in spontaneous and coordinated fashion  Neuro: A&Ox4 (person, place, time, situation), speech fluent, gait steady, no focal deficits appreciated  Skin: No rash or lesions.  No pallor or jaundice.  No cyanosis.  Warm and dry.     EKG/LABS  Labs Reviewed   CBC WITH DIFFERENTIAL - Abnormal; Notable for the following components:       Result Value    Neutrophils-Polys 76.70 (*)     Lymphocytes 16.10 (*)     All other components within normal limits   COMP METABOLIC PANEL - Abnormal; Notable for the following components:    " Co2 19 (*)     Glucose 115 (*)     All other components within normal limits   URINALYSIS,CULTURE IF INDICATED   LIPASE   HCG QUAL SERUM   ESTIMATED GFR     RADIOLOGY/PROCEDURES   none  COURSE & MEDICAL DECISION MAKING    ASSESSMENT, COURSE AND PLAN  Viral syndrome, gastritis, esophagitis, pancreatitis, cholelithiasis, cholecystitis, side effect of medication, enteritis, UTI, nephrolithiasis    Care Narrative: Presents emergency room for symptoms as described above.  The patient has a history of gastroparesis, is not having issues with uncontrolled diabetes currently on has overall symptomology that suggest underlying gastroenteritis and gastrointestinal origin.  Vital signs are reassuring on initial assessment and during her time in the emergency room here she is not febrile nor does she have hypotension or other markers of hemodynamic instability.  Lab work obtained and shows no evidence of leukocytosis, no concerning anemia, no gross electrolyte abnormalities or signs of acute LFT elevations or obstructive hepatobiliary process or pancreatitis.  Her urinalysis does not show any evidence of acute stone or infectious etiology.  She has no CLYDE and her pregnancy test is negative.  After receiving symptomatic medications and IV fluids I gone back to the bedside she is able to be transition to p.o. meds and fluids and tolerates them well.  She has numerous allergies to medications and I do believe that rectal suppository medications and Bentyl will be best for her bridging from her acute viral illness over 2 recovery and she feels comfortable with this current plan.  She is discharged home in stable condition    Hydration: Based on the patient's presentation of Acute Diarrhea, Acute Vomiting, and Inability to take oral fluids the patient was given IV fluids. IV Hydration was used because oral hydration was not adequate alone. Upon recheck following hydration, the patient was improving.    DISPOSITION AND DISCUSSIONS  I  have discussed management of the patient with the following physicians and ARIES's:  none    Discussion of management with other QHP or appropriate source(s): None     Escalation of care considered, and ultimately not performed:diagnostic imaging and acute inpatient care management, however at this time, the patient is most appropriate for outpatient management    Barriers to care at this time, including but not limited to: none.     Decision tools and prescription drugs considered including, but not limited to:  bentyl/phenergan .    FINAL DIAGNOSIS  1. Dehydration    2. Nausea and vomiting, unspecified vomiting type    3. Viral illness    4. Epigastric pain      Electronically signed by: Rahul Segura M.D., 11/23/2024 1:47 PM

## 2024-11-25 ENCOUNTER — OCCUPATIONAL THERAPY (OUTPATIENT)
Dept: OCCUPATIONAL THERAPY | Facility: REHABILITATION | Age: 34
End: 2024-11-25
Attending: STUDENT IN AN ORGANIZED HEALTH CARE EDUCATION/TRAINING PROGRAM
Payer: MEDICARE

## 2024-11-25 DIAGNOSIS — M25.549 ARTHRALGIA OF METACARPOPHALANGEAL JOINT, UNSPECIFIED LATERALITY: ICD-10-CM

## 2024-11-25 DIAGNOSIS — Q79.60 EHLERS-DANLOS SYNDROME: ICD-10-CM

## 2024-11-25 PROCEDURE — 97110 THERAPEUTIC EXERCISES: CPT

## 2024-11-25 PROCEDURE — RXMED WILLOW AMBULATORY MEDICATION CHARGE: Performed by: NURSE PRACTITIONER

## 2024-11-25 NOTE — OP THERAPY DISCHARGE SUMMARY
Outpatient Occupational Therapy  DISCHARGE SUMMARY NOTE    [facility] Occupational Therapy [address]  [address]  [place] [address]  Phone:  [phone]  Fax:  [phone]    Date of Visit: 11/25/2024    Patient: Kristin Balderrama  YOB: 1990  MRN: [id number]     Referring Provider: Fly Goodson M.D.  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      Recommendations:  Continue with HEP  Continue utilizing joint protection, energy conservation and diaphragmatic breathing techniques during daily tasks to minimize aggravation of symptoms  Continue working on sleep hygiene to impove quality and length of sleep     Kyraleighann Hou OTR/L    Date: 11/25/2024

## 2024-11-25 NOTE — OP THERAPY DAILY TREATMENT
Outpatient Occupational Therapy  DAILY TREATMENT     [facility] Occupational [address]  [address]  [place] [address]  Phone:  [phone]  Fax:  [phone]    Date: 11/25/2024    Patient: Kristin Balderrama  YOB: 1990  MRN: [id number]     Time Calculation  Start time: 0230  Stop time: 0315 Time Calculation (min): 45 minutes         Chief Complaint: Extremity Weakness, Loss Of Balance, and Self Care Duties    Visit #: 9    SUBJECTIVE:  No issues regarding strength or current level of function since last visit.      OBJECTIVE:  Current objective measures:    Postural Observations  Seated posture: fair  Standing posture: fair  Correction of posture: makes symptoms better        Neurological Testing   Sensation   Wrist/Hand   Left   Hypersensation: light touch   Right   Hypersensation: light touch     Bilateral hand hypersensitivity; especially with hot and cold  Quick response to therapist tapping hands.       Active Range of Motion   Normal active range of motion for all right UE except shoulder     Right Shoulder   Flexion: 190 degrees   Extension: WFL  Abduction: 140 degrees   Adduction: WFL  External rotation 0°: WFL     Additional Active Range of Motion Details  Patient stated she has had a right rotator cuff tear in the past   Some hypermobility noted; however quite minimal on examination      Scapular Mobility   Left Shoulder   Scapular Mobility with Shoulder to 90° FF   Scapular winging: minimal     Right Shoulder   Scapular Mobility with Shoulder to 90° FF   Scapular winging: minimal     Strength      Left Wrist/Hand    (2nd hand position)     Trial 1: 62     Right Wrist/Hand    (2nd hand position)     Trial 1: 62     Tests      Left Wrist/Hand   Negative CMC grind, distal radial-ulnar joint stress, extrinsic extensor tightness and extrinsic flexor tightness.      Right Wrist/Hand   Negative CMC grind, distal radial-ulnar joint stress, extrinsic extensor  tightness and extrinsic flexor tightness.      Left Elbow   Negative Cozen's.      Right Elbow   Negative Cozen's.      General Comments      Wrist/Hand Comments  9 hole peg test:  R=16 seconds  L = 18 seconds      Activities of Daily Living:      Bathing:      Bathing comments:   Unable to tolerate standing in the small step in shower cubicle and tending to sponge bathe.     Dressing:     Lower body adaptive equipment used: long shoe horn, reacher, dressing stick and sock aid     Patient wanting to utilize her Body Braid to stabilize her joints during ADLs and currently is Mod I with UB and SBA/supervision with LB with moderate verbal cues       ADL Comments:  Uses w/c on occasion (3-4 x a year).  Mod I dressing.   Small step in shower. Difficult to use so normally sponge bathes  Has a commode which OTR recommended she use over the toilet as a toilet frame to assist with toilet transfers.    Unable to stand more than 13 minutes at a counter during a functional task     Improving sleep hygiene = able to get about 5 hours continuous sleep          Therapeutic Exercises (CPT 86238):     1. AROM of right shoulder    2. AAROM of right shoulder    3. Shoulder arc to enhance shouler AROM    4. medium resistance theraband for gross UE and upper back strengthening    5. medium resistance theraputty for hand strengthening    6. flex bar - green - firm    7. 11# graded clip, 10 x each hand    8. 9# digi-flex, 10 x each hand    9. fine motor manipulation and dexterity, manipulation of golf balls    Therapeutic Exercise Summary:         Therapeutic Treatments and Modalities:    1. Therapeutic Activities (CPT 07774)    Therapeutic Treatments and Modalities Summary: Dynamic standing activity of lifting laundry basket of clothing from counter and carrying to elevated mat, folding laundry and placing back in the basket and carried back to counter.  Then walked over to [facility] and completed reaching activity.  Total standing time of  13 minutes and 30 seconds  with therapeutic rest  at end and utilizing diaphragmatic breathing     Time-based treatments/modalities:  Therapeutic exercise minutes (CPT 74155): 45 minutes        Pain rating before treatment: 6  Pain rating after treatment: 5    ASSESSMENT:   Response to treatment: Patient has progressed well in therapy and met set goals.      PLAN/RECOMMENDATIONS:   Plan for treatment: no further treatment needed.  Planned interventions for next visit:  last therapy session today

## 2024-11-26 ENCOUNTER — APPOINTMENT (OUTPATIENT)
Dept: OCCUPATIONAL THERAPY | Facility: REHABILITATION | Age: 34
End: 2024-11-26
Attending: STUDENT IN AN ORGANIZED HEALTH CARE EDUCATION/TRAINING PROGRAM
Payer: MEDICARE

## 2024-11-26 PROCEDURE — RXMED WILLOW AMBULATORY MEDICATION CHARGE: Performed by: INTERNAL MEDICINE

## 2024-11-27 ENCOUNTER — PHARMACY VISIT (OUTPATIENT)
Dept: PHARMACY | Facility: MEDICAL CENTER | Age: 34
End: 2024-11-27
Payer: COMMERCIAL

## 2024-12-03 ENCOUNTER — APPOINTMENT (OUTPATIENT)
Dept: OCCUPATIONAL THERAPY | Facility: REHABILITATION | Age: 34
End: 2024-12-03
Attending: STUDENT IN AN ORGANIZED HEALTH CARE EDUCATION/TRAINING PROGRAM
Payer: MEDICARE

## 2024-12-05 ENCOUNTER — PHARMACY VISIT (OUTPATIENT)
Dept: PHARMACY | Facility: MEDICAL CENTER | Age: 34
End: 2024-12-05
Payer: COMMERCIAL

## 2024-12-05 ENCOUNTER — APPOINTMENT (OUTPATIENT)
Dept: OCCUPATIONAL THERAPY | Facility: REHABILITATION | Age: 34
End: 2024-12-05
Attending: STUDENT IN AN ORGANIZED HEALTH CARE EDUCATION/TRAINING PROGRAM
Payer: MEDICARE

## 2024-12-05 ENCOUNTER — OFFICE VISIT (OUTPATIENT)
Dept: URGENT CARE | Facility: CLINIC | Age: 34
End: 2024-12-05
Payer: MEDICARE

## 2024-12-05 VITALS
HEART RATE: 70 BPM | DIASTOLIC BLOOD PRESSURE: 78 MMHG | WEIGHT: 264 LBS | RESPIRATION RATE: 20 BRPM | SYSTOLIC BLOOD PRESSURE: 126 MMHG | TEMPERATURE: 97.3 F | OXYGEN SATURATION: 100 % | HEIGHT: 65 IN | BODY MASS INDEX: 43.99 KG/M2

## 2024-12-05 DIAGNOSIS — J01.40 ACUTE PANSINUSITIS, RECURRENCE NOT SPECIFIED: ICD-10-CM

## 2024-12-05 DIAGNOSIS — H66.91 RIGHT OTITIS MEDIA, UNSPECIFIED OTITIS MEDIA TYPE: ICD-10-CM

## 2024-12-05 PROBLEM — N20.0 KIDNEY STONE: Status: ACTIVE | Noted: 2024-03-29

## 2024-12-05 PROBLEM — R39.9: Status: ACTIVE | Noted: 2024-12-05

## 2024-12-05 PROBLEM — J44.9 CHRONIC OBSTRUCTIVE PULMONARY DISEASE (HCC): Status: ACTIVE | Noted: 2022-10-13

## 2024-12-05 PROCEDURE — RXMED WILLOW AMBULATORY MEDICATION CHARGE: Performed by: NURSE PRACTITIONER

## 2024-12-05 PROCEDURE — 99214 OFFICE O/P EST MOD 30 MIN: CPT | Performed by: NURSE PRACTITIONER

## 2024-12-05 PROCEDURE — 3078F DIAST BP <80 MM HG: CPT | Performed by: NURSE PRACTITIONER

## 2024-12-05 PROCEDURE — 3074F SYST BP LT 130 MM HG: CPT | Performed by: NURSE PRACTITIONER

## 2024-12-05 RX ORDER — AMOXICILLIN AND CLAVULANATE POTASSIUM 600; 42.9 MG/5ML; MG/5ML
875 POWDER, FOR SUSPENSION ORAL 2 TIMES DAILY
Qty: 125 ML | Refills: 0 | Status: SHIPPED | OUTPATIENT
Start: 2024-12-05 | End: 2024-12-14

## 2024-12-05 ASSESSMENT — FIBROSIS 4 INDEX: FIB4 SCORE: 0.56

## 2024-12-05 NOTE — PROGRESS NOTES
Chief Complaint   Patient presents with    Otalgia     X 2 months / R side of face pain/ R ear pain/facial congestion        HISTORY OF PRESENT ILLNESS: Patient is a pleasant 34 y.o. female who presents today due to two months of sinus symptoms.  Patient notes right-sided facial congestion, sinus pressure, ear pain.  States she was diagnosed with mastoiditis, without abscess.  She was treated last month, was oral out patient antibiotic therapy, reports improvement with return of symptoms over the past week.  Denies any fever today.  Reports malaise and fatigue.  She has been evaluated her ENT and was told he cannot help her.      Patient Active Problem List    Diagnosis Date Noted    Symptom involving bladder 12/05/2024    Abdominal pain 10/03/2024    Morbid obesity with BMI of 40.0-44.9, adult ([facility]) 09/23/2024    Kidney stone 03/29/2024    Paroxysmal atrial fibrillation ([facility]) 11/14/2023    SVT (supraventricular tachycardia) ([facility]) 11/14/2023    PCOS (polycystic ovarian syndrome) 11/14/2023    Inguinal hernia 11/14/2023    Myopia of both eyes 08/29/2023    Cervical radiculopathy 08/17/2023    Postural orthostatic tachycardia syndrome 01/28/2023    Moderate persistent asthma without complication 01/27/2023    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    Hypermobility syndrome 11/10/2022    History of nephrolithiasis 10/31/2022    Chronic obstructive pulmonary disease (HCC) 10/13/2022    Right inguinal hernia 10/09/2022    Migraine, hemiplegic 07/27/2022    Hidradenitis axillaris 07/10/2022    Vision changes 06/28/2022    Inappropriate sinus tachycardia (HCC) 09/24/2021    Normal pressure hydrocephalus ([facility]) 06/04/2021    Hemorrhoid 05/24/2021    Bilateral hand pain 04/05/2021    Prolonged Q-T interval on ECG 08/27/2020    Transverse myelitis ([facility]) 08/15/2020    Chronic restrictive lung disease 04/20/2020    Schizoaffective disorder, depressive type ([facility]) 03/02/2020    IIH (idiopathic intracranial hypertension) 02/27/2020     Mixed stress and urge urinary incontinence 12/27/2019    Immunocompromised ([facility]) 11/06/2019    Moderate persistent asthma with acute exacerbation 08/14/2019    Optic neuritis 05/27/2019    Transaminitis 08/08/2018    TONYA (obstructive sleep apnea) 01/09/2018    Vitamin D deficiency 05/21/2016    Severe obesity (BMI >= 40) ([facility]) 03/07/2016    Scoliosis 03/07/2016    GERD (gastroesophageal reflux disease) 03/07/2016    OAB (overactive bladder) 02/08/2016    Fatty liver disease, nonalcoholic 01/19/2015    Dissociative identity disorder ([facility]) 04/02/2011       Allergies:Cefdinir, Depakote [divalproex sodium], Doxycycline, Montelukast [singulair], Vancomycin, Wound dressing adhesive, Amitriptyline, Amoxicillin, Aripiprazole [abilify], Clindamycin, Erythromycin, Flomax [tamsulosin hydrochloride], Hydromorphone, Levaquin, Metformin, Sulfa drugs, Tamsulosin, Tape, Sulfamethoxazole w-trimethoprim, Ciprofloxacin, Keflex, Levofloxacin, and Metronidazole    Current Outpatient Medications Ordered in Epic   Medication Sig Dispense Refill    amoxicillin-clavulanate (AUGMENTIN) 600-42.9 MG/5ML Recon Susp suspension Take 7.3 mL by mouth 2 times a day for 7 days. Discard remainder. 125 mL 0    sucralfate (CARAFATE) 1 GM Tab Take 1 Tablet by mouth 3 times a day before meals for 14 days. 42 Tablet 0    fluconazole (DIFLUCAN) 150 MG tablet Take 1 tablet Orally x1 1 Tablet 0    naproxen (NAPROSYN) 500 MG Tab Take 1 tablet by mouth twice A Day. 60 Tablet 0    ketoconazole (NIZORAL) 2 % Cream APPLY TO AFFECTED AREA EVERY DAY AS NEEDED FOR RASH      loperamide (IMODIUM) 2 MG Cap TAKE 1 CAPSULE BY MOUTH FOUR TIMES A DAY AS NEEDED FOR DIARRHEA      ondansetron (ZOFRAN ODT) 4 MG TABLET DISPERSIBLE DISSOLVE 1 TABLET ON THE TONGUE EVERY 8 HOURS FOR 5 DAYS AS NEEDED FOR NAUSEA/VOMITING      traMADol (ULTRAM) 50 MG Tab TAKE 1 TABLET BY MOUTH EVERY 6 HOURS AS NEEDED FOR MODERATE PAIN FOR UP TO 5 DAYS.      ivabradine (CORLANOR) 7.5 MG Tab tablet TAKE  1 TABLET BY MOUTH TWICE A DAY WITH FOOD 180 Tablet 1    topiramate (TOPAMAX) 50 MG tablet Take 1 Tablet by mouth 2 times a day. 180 Tablet 4    prochlorperazine (COMPAZINE) 10 MG Tab Take 1 Tablet by mouth every 6 hours as needed for Nausea/Vomiting. 30 Tablet 0    ciprofloxacin/dexamethasone (CIPRODEX) 0.3-0.1 % Suspension Administer 4 Drops into affected ear(s) 2 times a day. 7.5 mL 0    clindamycin 1 % Gel Apply topically once or twice a day to underarms/face as needed for outbreak 60 g 3    tizanidine (ZANAFLEX) 4 MG capsule take 1 Tablet Three Times A Day as needed Qty 90  Dx.M54.16 90 Capsule 0    scopolamine (TRANSDERM SCOP, 1.5 MG,) 1 mg/72hr PATCH 72 HR Place 1 Patch on the skin every 72 hours. 7 Patch 1    ipratropium-albuterol (COMBIVENT RESPIMAT)  MCG/ACT Aero Soln Inhale 2 Puffs 4 times a day as needed (shortness of breath, wheezing). 4 g 1    albuterol (PROVENTIL) 2.5mg/3ml Nebu Soln solution for nebulization Take 3 mL by nebulization every four hours as needed for Shortness of Breath. 75 mL 0    ziprasidone (GEODON) 40 MG Cap Take 1 capsule by mouth at bedtime. 90 Capsule 1    amLODIPine (NORVASC) 5 MG Tab Take 1 tablet by mouth every day. 90 Tablet 1    metoprolol SR (TOPROL XL) 25 MG TABLET SR 24 HR Take 1 Tablet by mouth 2 times a day. 200 Tablet 3    Galcanezumab-gnlm (EMGALITY) 120 MG/ML Solution Auto-injector Inject 1 mL subcutaneously every month. 1 mL 11    lamoTRIgine (LAMICTAL) 25 MG Tab Take 2 Tablets by mouth 2 times a day. 360 Tablet 1    Rimegepant Sulfate (NURTEC) 75 MG TABLET DISPERSIBLE Take 1 tablet by mouth at onset of migraine or aura okay to repeat in 24 hours if needed. 8 Tablet 5    sumatriptan (IMITREX) 20 MG/ACT nasal spray Give 1 dose at onset headache okay to repeat in 2 hours if needed for max of 2 doses in a 24 hour timeframe. 6 Each 5    ivabradine (CORLANOR) 7.5 MG Tab tablet Take 1 Tablet by mouth 2 times a day with meals. 60 Tablet 3    diphenhydrAMINE (BENADRYL)  25 MG Tab Take 25-50 mg by mouth 2 times a day. Scheduled    25 mg = AM  50 MG = PM      Melatonin 10 MG Tab Take 10 mg by mouth at bedtime.      spironolactone (ALDACTONE) 25 MG Tab Take 25 mg by mouth every day.      gabapentin (NEURONTIN) 400 MG Cap Take 1,200 mg by mouth 3 times a day. 3 capsules=1200mg      omeprazole (PRILOSEC) 20 MG delayed-release capsule Take 20 mg by mouth 2 times a day.      nystatin (MYCOSTATIN) powder Apply 1 application Externally Twice a day for 10 days 30 g 1    gabapentin (NEURONTIN) 400 MG Cap TAKE 3 CAPSULES BY MOUTH THREE TIMES DAILY. 270 Capsule 0    tizanidine (ZANAFLEX) 4 MG Tab Take 1 tablet by mouth three times a day. 90 Tablet 0    predniSONE (DELTASONE) 10 MG Tab Take 2 tablets by mouth twice a day for 5 days, then 1 tab twice a day for 5 days, then 1 tab once a day for 5 days, then 1/2 tab once a day for 5 days, then stop (Patient not taking: Reported on 12/5/2024) 40 Tablet 0    predniSONE (DELTASONE) 20 MG Tab Take 1 tablet by mouth daily for 5 Days (Patient not taking: Reported on 12/5/2024) 5 Tablet 0    cephALEXin (KEFLEX) 500 MG Cap TAKE 1 CAPSULE BY MOUTH 3 TIMES A DAY FOR 7 DAYS (Patient not taking: Reported on 12/5/2024)      doxycycline (VIBRAMYCIN) 100 MG Cap TAKE 1 CAPSULE BY MOUTH TWICE A DAY FOR 10 DAYS (Patient not taking: Reported on 12/5/2024)      LORazepam (ATIVAN) 1 MG Tab  (Patient not taking: Reported on 12/5/2024)      oxyCODONE immediate-release (ROXICODONE) 5 MG Tab TAKE 2 TABLETS BY MOUTH 2 TIMES A DAY FOR 4 DAYS (Patient not taking: Reported on 12/5/2024)      oxyCODONE-acetaminophen (PERCOCET) 5-325 MG Tab TAKE 1 TABLET BY MOUTH EVERY 6 HOURS FOR 5 DAYS (Patient not taking: Reported on 12/5/2024)      phenazopyridine (PYRIDIUM) 200 MG Tab Take 1 tablet 3 times a day by oral route for 5 days. (Patient not taking: Reported on 12/5/2024)      polymixin-trimethoprim (POLYTRIM) 81291-1.1 UNIT/ML-% Solution ADMINISTER 1 DROP INTO BOTH EYES EVERY 4  "HOURS FOR 7 DAYS. (Patient not taking: Reported on 12/5/2024)      sulfamethoxazole-trimethoprim (BACTRIM DS) 800-160 MG tablet Take 1 Tablet by mouth 2 times a day. (Patient not taking: Reported on 12/5/2024)      tizanidine (ZANAFLEX) 4 MG capsule Take 1 capsule Three Times A Day as needed Qty 90  Dx.M79.18 90 Capsule 0    spironolactone (ALDACTONE) 50 MG Tab Take 1 tablet by mouth daily (Patient not taking: Reported on 12/5/2024) 90 Tablet 3    adalimumab (HUMIRA) 80 MG/0.8ML injection Inject 80 mg under the skin every 14 days. (Patient not taking: Reported on 11/4/2024)       No current [facility]-ordered facility-administered medications on file.       Past Medical History:   Diagnosis Date    Abdominal pain     Anginal syndrome     Random chest pain especially with tachycardia    Apnea, sleep     Arthritis     ASTHMA     when around smoke    Back pain     Borderline personality disorder (HCC)     Chickenpox     Chronic UTI 09/18/2010    Daytime sleepiness     Dental disorder 03/08/2021    Infected tooth    Depression     Diabetes (HCC)     Diarrhea     Incontinece    Disorder of thyroid     Hashimoto's    Fatigue     Frequent headaches     Heart burn     History of falling     Inappropriate sinus tachycardia (HCC) 2016    Statusp post ablation by Dr. Kumar.    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    Urinary bladder disorder     " "Suprapubic cath. 2/8/22: Not anymore.     Urinary incontinence     Weakness     Wears glasses        Social History     Tobacco Use    Smoking status: Never    Smokeless tobacco: Never   Vaping Use    Vaping status: Never Used   Substance Use Topics    Alcohol use: No     Alcohol/week: 0.0 oz    Drug use: Never       Family Status   Relation Name Status    Mo thyroid disease,IBS Alive        44 2016    Fa  Alive        bio father hx unknown    Sis  Alive    Sis  Alive    MUnc  Alive    MGMo  Alive    Neg Hx  (Not Specified)   No partnership data on file     Family History   Problem Relation Age of Onset    Hypertension Mother     Sleep Apnea Mother     Heart Disease Mother     Other Mother         hypothryod    No Known Problems Sister     Other Sister         Narcolepsy;fibromyalsia;bone;nerve    Genitourinary () Problems Sister         endometriosis    Hypertension Maternal Uncle     Heart Disease Maternal Grandmother     Hypertension Maternal Grandmother     Cancer Neg Hx        ROS:  Review of Systems   Constitutional: Positive for malaise, fatigue. Negative for weight loss.   HENT: Positive for right-sided sinus pressure, sore throat, nasal congestion, ear pain. Negative for nosebleeds, neck pain.    Eyes: Negative for vision changes.   Neuro: Positive for headache. Negative for sensory changes, weakness, seizure, LOC.  Cardiovascular: Negative for chest pain, palpitations, orthopnea and leg swelling.   Respiratory: Negative for cough, sputum production, shortness of breath and wheezing.   Gastrointestinal: Negative for abdominal pain, nausea, vomiting or diarrhea.    Skin: Negative for rash, diaphoresis.     Exam:  /78   Pulse 70   Temp 36.3 °C (97.3 °F) (Temporal)   Resp 20   Ht 1.651 m (5' 5\")   Wt 120 kg (264 lb)   SpO2 100%   General: well-nourished, well-developed female in NAD  Head: normocephalic, atraumatic  Eyes: PERRLA, no conjunctival injection, acuity grossly intact, lids " normal.  Ears: normal shape and symmetry, no tenderness, no discharge. External canals are without any significant edema or erythema.  Left tympanic membrane is without any inflammation, no effusion.  Right tympanic membrane is erythematous, directed, dull, intact.  Gross auditory acuity is intact.  Nose: symmetrical without tenderness, erythema and swelling noted bilateral turbinates, clear discharge.  Right maxillary sinus tenderness.   Mouth/Throat: reasonable hygiene, no exudates or tonsillar enlargement. Erythema is present.   Neck: no masses, range of motion within normal limits, no tracheal deviation. No obvious thyroid enlargement.   Lymph: no cervical adenopathy. No supraclavicular adenopathy.   Neuro: alert and oriented. Cranial nerves 1-12 grossly intact. No sensory deficit.   Cardiovascular: regular rate and rhythm. No edema.  Pulmonary: no distress. Chest is symmetrical with respiration, no wheezes, crackles, or rhonchi.   Musculoskeletal: no clubbing, appropriate muscle tone, gait is stable.  Skin: warm, dry, intact, no clubbing, no cyanosis, no rashes.   Psych: appropriate mood, affect, judgement.         Assessment/Plan:  1. Acute pansinusitis, recurrence not specified  amoxicillin-clavulanate (AUGMENTIN) 600-42.9 MG/5ML Recon Susp suspension    Referral to ENT      2. Right otitis media, unspecified otitis media type  amoxicillin-clavulanate (AUGMENTIN) 600-42.9 MG/5ML Recon Susp suspension    Referral to ENT          Patient presents with sinusitis and otitis media, discussed recurrent versus acute.  Patient will be placed on Augmentin, she has tolerated this medication before.  Referral is placed into for follow-up.  Sleep with HOB elevated, humidifier at night, rest, increase fluid intake.   Supportive care, differential diagnoses, and indications for immediate follow-up discussed with patient.   Pathogenesis of diagnosis discussed including typical length and natural progression.   Instructed to  return to clinic or nearest emergency department for any change in condition, further concerns, or worsening of symptoms.  Patient states understanding of the plan of care and discharge instructions.  Instructed to make an appointment, for follow up, with her primary care provider.        Please note that this dictation was created using voice recognition software. I have made every reasonable attempt to correct obvious errors, but I expect that there are errors of grammar and possibly content that I did not discover before finalizing the note.       PER Mehta.

## 2024-12-07 ENCOUNTER — PHARMACY VISIT (OUTPATIENT)
Dept: PHARMACY | Facility: MEDICAL CENTER | Age: 34
End: 2024-12-07
Payer: COMMERCIAL

## 2024-12-07 PROCEDURE — RXMED WILLOW AMBULATORY MEDICATION CHARGE

## 2024-12-09 PROCEDURE — RXMED WILLOW AMBULATORY MEDICATION CHARGE: Performed by: NURSE PRACTITIONER

## 2024-12-10 ENCOUNTER — OFFICE VISIT (OUTPATIENT)
Dept: URGENT CARE | Facility: CLINIC | Age: 34
End: 2024-12-10
Payer: MEDICARE

## 2024-12-10 VITALS
TEMPERATURE: 97.4 F | SYSTOLIC BLOOD PRESSURE: 132 MMHG | WEIGHT: 265 LBS | OXYGEN SATURATION: 99 % | HEIGHT: 64 IN | RESPIRATION RATE: 14 BRPM | BODY MASS INDEX: 45.24 KG/M2 | DIASTOLIC BLOOD PRESSURE: 80 MMHG | HEART RATE: 72 BPM

## 2024-12-10 ASSESSMENT — FIBROSIS 4 INDEX: FIB4 SCORE: 0.56

## 2024-12-11 ENCOUNTER — HOSPITAL ENCOUNTER (EMERGENCY)
Facility: MEDICAL CENTER | Age: 34
End: 2024-12-11
Attending: STUDENT IN AN ORGANIZED HEALTH CARE EDUCATION/TRAINING PROGRAM
Payer: MEDICARE

## 2024-12-11 ENCOUNTER — PHARMACY VISIT (OUTPATIENT)
Dept: PHARMACY | Facility: MEDICAL CENTER | Age: 34
End: 2024-12-11
Payer: COMMERCIAL

## 2024-12-11 VITALS
DIASTOLIC BLOOD PRESSURE: 91 MMHG | RESPIRATION RATE: 18 BRPM | OXYGEN SATURATION: 97 % | TEMPERATURE: 98 F | WEIGHT: 265 LBS | SYSTOLIC BLOOD PRESSURE: 174 MMHG | BODY MASS INDEX: 45.49 KG/M2 | HEART RATE: 74 BPM

## 2024-12-11 DIAGNOSIS — M76.61 ACHILLES TENDINITIS OF BOTH LOWER EXTREMITIES: Primary | ICD-10-CM

## 2024-12-11 DIAGNOSIS — M76.62 ACHILLES TENDINITIS OF BOTH LOWER EXTREMITIES: Primary | ICD-10-CM

## 2024-12-11 PROCEDURE — A9270 NON-COVERED ITEM OR SERVICE: HCPCS | Mod: UD | Performed by: STUDENT IN AN ORGANIZED HEALTH CARE EDUCATION/TRAINING PROGRAM

## 2024-12-11 PROCEDURE — 700102 HCHG RX REV CODE 250 W/ 637 OVERRIDE(OP): Mod: UD | Performed by: STUDENT IN AN ORGANIZED HEALTH CARE EDUCATION/TRAINING PROGRAM

## 2024-12-11 PROCEDURE — 99284 EMERGENCY DEPT VISIT MOD MDM: CPT

## 2024-12-11 PROCEDURE — RXMED WILLOW AMBULATORY MEDICATION CHARGE: Performed by: STUDENT IN AN ORGANIZED HEALTH CARE EDUCATION/TRAINING PROGRAM

## 2024-12-11 RX ORDER — OXYCODONE AND ACETAMINOPHEN 5; 325 MG/1; MG/1
1 TABLET ORAL ONCE
Status: COMPLETED | OUTPATIENT
Start: 2024-12-11 | End: 2024-12-11

## 2024-12-11 RX ADMIN — OXYCODONE HYDROCHLORIDE AND ACETAMINOPHEN 1 TABLET: 5; 325 TABLET ORAL at 20:21

## 2024-12-11 ASSESSMENT — PAIN DESCRIPTION - PAIN TYPE: TYPE: ACUTE PAIN

## 2024-12-11 ASSESSMENT — FIBROSIS 4 INDEX: FIB4 SCORE: 0.56

## 2024-12-11 NOTE — Clinical Note
Kristin Balderrama was seen and treated in our emergency department on 12/11/2024.  She may return to work on 12/15/2024.       If you have any questions or concerns, please don't hesitate to call.      Liban Chavez M.D.

## 2024-12-12 NOTE — DISCHARGE INSTRUCTIONS
Please follow-up with physical therapy, your orthopedic doctor and your primary care doctor.  Please rest for the neck several days, use ice other anti-inflammatories that you are currently on.  If you have any other concerning symptoms please feel free to return to the emergency department for further evaluation.

## 2024-12-12 NOTE — ED PROVIDER NOTES
ER Provider Note    Scribed for Liban Chavez M.d. by Sara Joseph. 12/11/2024  7:57 PM    Primary Care Provider: Fly Goodson M.D.    CHIEF COMPLAINT   Chief Complaint   Patient presents with    Leg Pain     Bilateral achilles tendon pain. Pt sprained both achilles tendons. Pt presents in bilateral boots from the [facility]     EXTERNAL RECORDS REVIEWED  External ED Note Patient was seen as an outpatient at [facility] main express for Achilles tendonitis. Patient has a history of Annetta-danlos. Xray at that time showed no acute fracture. Patient was diagnosed with bilateral Achilles tendonitis. Patient was prescribed Meloxicam and was placed in boots with heel lifts and was discharged home. Patient was recommended to wear the boots for two weeks.     HPI/ROS  LIMITATION TO HISTORY   Select: : None  OUTSIDE HISTORIAN(S):  None    Kristin Balderrama is a 34 y.o. female who presents to the ED complaining of bilateral lower leg pain onset prior to arrival. The patient reports that she does have bilateral achilles strains and was placed in boots. 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 The patient notes an increase in pain which prompted her to report to the ED for further evaluation. She describes her pain as a sharp stabbing sensation, adding that there is also some neurological pain. The patient reports that her right leg is worse than her left leg. She notes that she has been losing mobility in her legs due to the pain. The patient states that she has checked to ensure that her boots aren't too tight, but that when she removes the boots her legs sill swell. She reports that she has a physical therapy appointment scheduled for the end of this month.       PAST MEDICAL HISTORY  Past Medical History:   Diagnosis Date    Abdominal pain     Anginal syndrome     Random chest pain especially with tachycardia    Apnea, sleep     Arthritis     ASTHMA     when  "around smoke    Back pain     Borderline personality disorder (HCC)     Chickenpox     Chronic UTI 09/18/2010    Daytime sleepiness     Dental disorder 03/08/2021    Infected tooth    Depression     Diabetes (HCC)     Diarrhea     Incontinece    Disorder of thyroid     Hashimoto's    Fatigue     Frequent headaches     Heart burn     History of falling     Inappropriate sinus tachycardia (HCC) 2016    Statusp post ablation by Dr. Kumar.    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    Urinary bladder disorder     Suprapubic cath. 2/8/22: Not anymore.     Urinary incontinence     Weakness     Wears glasses        SURGICAL HISTORY  Past Surgical History:   Procedure Laterality Date    NV CYSTOSCOPY,INSERT URETERAL STENT Right 2/12/2024    Procedure: CYSTOSCOPY, WITH RIGHT URETEROSCOPY, WITH LITHOTRIPSY, WITH INSERTION OF RIGHT URETERAL STENT;  Surgeon: Josafat Roberson M.D.;  Location: SURGERY [facility];  Service: Urology    NV CYSTO/URETERO/PYELOSCOPY, DX Right 2/12/2024    Procedure: URETEROSCOPY;  Surgeon: Josafat Roberson M.D.;  Location: SURGERY [facility];  Service: Urology    LASERTRIPSY Right 2/12/2024    Procedure: LITHOTRIPSY, USING LASER;  Surgeon: Josafat Roberson M.D.;  Location: SURGERY [facility];  Service: Urology    INGUINAL HERNIA LAPAROSCOPIC Right 07/21/2023    Procedure: LAPAROSCOPIC RIGHT INGUINAL HERNIA REPAIR " WITH MESH;  Surgeon: Joe Noyola M.D.;  [facility];  Service: General    HERNIA REPAIR Right 07/21/2023    PB PERCUT FIX PBOX/NECK FEMUR FX Left 01/28/2023    Procedure: FIXATION, HIP, USING CANNULATED SCREW;  Surgeon: Noman Abdul M.D.;  [facility];  Service: Orthopedics    AR LAP,DIAGNOSTIC ABDOMEN  02/14/2022    Procedure: LAPAROSCOPY;  Surgeon: Seamus Pisano M.D.;  Location: SURGERY SAME DAY [facility];  Service: Gynecology    OVARIAN CYSTECTOMY Right 02/14/2022    Procedure: EXCISION, CYST, OVARY;  Surgeon: Seamus Pisano M.D.;  Location: SURGERY SAME DAY [facility];  Service: Gynecology    BOWEL STIMULATOR INSERTION  03/10/2021    Procedure: INSERTION, ELECTRODE LEADS AND PULSE GENERATOR, NEUROSTIMULATOR, SACRAL - REMOVAL OF INTERSTIM WITH REPLACEMENT OF SACRAL NEUROMODULATION DEVICE;  Surgeon: Joe Noyola M.D.;  [facility];  Service: General    MUSCLE BIOPSY Right 01/26/2017    Procedure: MUSCLE BIOPSY - THIGH;  Surgeon: Isidro Vigil M.D.;  Location: [facility];  Service:     GASTROSCOPY WITH BALLOON DILATATION N/A 01/04/2017    Procedure: GASTROSCOPY WITH DILATATION;  Surgeon: Torres Vargas M.D.;  Location: [facility];  Service:     BOWEL STIMULATOR INSERTION  07/13/2016    Procedure: BOWEL STIMULATOR INSERTION FOR PERMANENT INTERSTIM SACRAL IMPLANT;  Surgeon: Joe Noyola M.D.;  Location: [facility];  Service:     RECOVERY  01/27/2016    Procedure: CATH LAB EP STUDY WITH SINUS NODE MODIFICATION ABHINAV;  Surgeon: [facility];  [place] [facility] [facility]  Service:     OTHER CARDIAC SURGERY  01/2016    cardiac ablation    NEURO DEST FACET L/S W/IG SNGL  04/21/2015    Performed by Reza Tabor at [facility] ORS    LUMBAR FUSION ANTERIOR  08/21/2012    Performed by SUSIE SAWANT at [facility]    ALYSSA BY LAPAROSCOPY  08/29/2010    Performed by ANDREAS  SHAYY SAUNDERS at SURGERY [facility] ORS    LAMINOTOMY      OTHER ABDOMINAL SURGERY      TONSILLECTOMY      tonsillectomy       FAMILY HISTORY  Family History   Problem Relation Age of Onset    Hypertension Mother     Sleep Apnea Mother     Heart Disease Mother     Other Mother         hypothryod    No Known Problems Sister     Other Sister         Narcolepsy;fibromyalsia;bone;nerve    Genitourinary () Problems Sister         endometriosis    Hypertension Maternal Uncle     Heart Disease Maternal Grandmother     Hypertension Maternal Grandmother     Cancer Neg Hx        SOCIAL HISTORY   reports that she has never smoked. She has never used smokeless tobacco. She reports that she does not drink alcohol and does not use drugs.    CURRENT MEDICATIONS  Discharge Medication List as of 12/11/2024  8:58 PM        CONTINUE these medications which have NOT CHANGED    Details   !! gabapentin (NEURONTIN) 400 MG Cap TAKE 3 CAPSULES BY MOUTH THREE TIMES DAILY.  FOR 30 DAY SUPPLY. DX: M79.7, Disp-270 Capsule, R-0, Normal      naproxen (NAPROSYN) 500 MG Tab Take one tablet orally twice daily, Disp-60 Tablet, R-0, Normal      tizanidine (ZANAFLEX) 4 MG Tab Take 1 tablet orally 3 times daily, Disp-90 Tablet, R-0, Normal      meloxicam (MOBIC) 15 MG tablet Take 1 Tablet by mouth every day., Disp-30 Tablet, R-0, Normal      amoxicillin-clavulanate (AUGMENTIN) 600-42.9 MG/5ML Recon Susp suspension Take 7.3 mL by mouth 2 times a day for 7 days. Discard remainder., Disp-125 mL, R-0, Normal      fluconazole (DIFLUCAN) 150 MG tablet Take 1 tablet Orally x1, Disp-1 Tablet, R-0, Normal      nystatin (MYCOSTATIN) powder Apply 1 application Externally Twice a day for 10 days, Disp-30 g, R-1, Normal      !! predniSONE (DELTASONE) 10 MG Tab Take 2 tablets by mouth twice a day for 5 days, then 1 tab twice a day for 5 days, then 1 tab once a day for 5 days, then 1/2 tab once a day for 5 days, then stop, Disp-40 Tablet, R-0, Normal      !!  predniSONE (DELTASONE) 20 MG Tab Take 1 tablet by mouth daily for 5 Days, Disp-5 Tablet, R-0, Normal      cephALEXin (KEFLEX) 500 MG Cap Historical Med      doxycycline (VIBRAMYCIN) 100 MG Cap TAKE 1 CAPSULE BY MOUTH TWICE A DAY FOR 10 DAYS, Historical Med      ketoconazole (NIZORAL) 2 % Cream APPLY TO AFFECTED AREA EVERY DAY AS NEEDED FOR RASH, Historical Med      loperamide (IMODIUM) 2 MG Cap TAKE 1 CAPSULE BY MOUTH FOUR TIMES A DAY AS NEEDED FOR DIARRHEA, Historical Med      LORazepam (ATIVAN) 1 MG Tab Historical Med      ondansetron (ZOFRAN ODT) 4 MG TABLET DISPERSIBLE DISSOLVE 1 TABLET ON THE TONGUE EVERY 8 HOURS FOR 5 DAYS AS NEEDED FOR NAUSEA/VOMITING, Historical Med      oxyCODONE immediate-release (ROXICODONE) 5 MG Tab TAKE 2 TABLETS BY MOUTH 2 TIMES A DAY FOR 4 DAYS, Historical Med      oxyCODONE-acetaminophen (PERCOCET) 5-325 MG Tab TAKE 1 TABLET BY MOUTH EVERY 6 HOURS FOR 5 DAYS, Historical Med      phenazopyridine (PYRIDIUM) 200 MG Tab Take 1 tablet 3 times a day by oral route for 5 days., Historical Med      polymixin-trimethoprim (POLYTRIM) 05828-9.1 UNIT/ML-% Solution ADMINISTER 1 DROP INTO BOTH EYES EVERY 4 HOURS FOR 7 DAYS., Historical Med      sulfamethoxazole-trimethoprim (BACTRIM DS) 800-160 MG tablet Take 1 Tablet by mouth 2 times a day., Historical Med      traMADol (ULTRAM) 50 MG Tab TAKE 1 TABLET BY MOUTH EVERY 6 HOURS AS NEEDED FOR MODERATE PAIN FOR UP TO 5 DAYS., Historical Med      !! ivabradine (CORLANOR) 7.5 MG Tab tablet TAKE 1 TABLET BY MOUTH TWICE A DAY WITH FOOD, Disp-180 Tablet, R-1, Normal      topiramate (TOPAMAX) 50 MG tablet Take 1 Tablet by mouth 2 times a day.THIS IS A 90 DAY SUPPLYDisp-180 Tablet, R-4, Normal      prochlorperazine (COMPAZINE) 10 MG Tab Take 1 Tablet by mouth every 6 hours as needed for Nausea/Vomiting., Disp-30 Tablet, R-0, Normal      ciprofloxacin/dexamethasone (CIPRODEX) 0.3-0.1 % Suspension Administer 4 Drops into affected ear(s) 2 times a day., Disp-7.5  mL, R-0, Normal      !! tizanidine (ZANAFLEX) 4 MG capsule Take 1 capsule Three Times A Day as needed Qty 90  Dx.M79.18DX: M79.18Disp-90 Capsule, R-0, Normal      !! spironolactone (ALDACTONE) 50 MG Tab Take 1 tablet by mouth daily, Disp-90 Tablet, R-3, Normal      clindamycin 1 % Gel Apply topically once or twice a day to underarms/face as needed for outbreak, Disp-60 g, R-3, Normal      !! tizanidine (ZANAFLEX) 4 MG capsule take 1 Tablet Three Times A Day as needed Qty 90  Dx.M54.16M79.10Disp-90 Capsule, R-0, Normal      scopolamine (TRANSDERM SCOP, 1.5 MG,) 1 mg/72hr PATCH 72 HR Place 1 Patch on the skin every 72 hours., Disp-7 Patch, R-1, Normal      ipratropium-albuterol (COMBIVENT RESPIMAT)  MCG/ACT Aero Soln Inhale 2 Puffs 4 times a day as needed (shortness of breath, wheezing)., Disp-4 g, R-1, Normal      albuterol (PROVENTIL) 2.5mg/3ml Nebu Soln solution for nebulization Take 3 mL by nebulization every four hours as needed for Shortness of Breath., Disp-75 mL, R-0, Normal      ziprasidone (GEODON) 40 MG Cap Take 1 capsule by mouth at bedtime., Disp-90 Capsule, R-1, Normal      amLODIPine (NORVASC) 5 MG Tab Take 1 tablet by mouth every day., Disp-90 Tablet, R-1, Normal      metoprolol SR (TOPROL XL) 25 MG TABLET SR 24 HR Take 1 Tablet by mouth 2 times a day.This a new dose as of 9/4/2024.Disp-200 Tablet, R-3, Normal      Galcanezumab-gnlm (EMGALITY) 120 MG/ML Solution Auto-injector Inject 1 mL subcutaneously every month., Disp-1 mL, R-11, Normal      lamoTRIgine (LAMICTAL) 25 MG Tab Take 2 Tablets by mouth 2 times a day., Disp-360 Tablet, R-1, Normal      Rimegepant Sulfate (NURTEC) 75 MG TABLET DISPERSIBLE Take 1 tablet by mouth at onset of migraine or aura okay to repeat in 24 hours if needed., Disp-8 Tablet, R-5, Normal      sumatriptan (IMITREX) 20 MG/ACT nasal spray Give 1 dose at onset headache okay to repeat in 2 hours if needed for max of 2 doses in a 24 hour timeframe., Disp-6 Each, R-5,  Normal      !! ivabradine (CORLANOR) 7.5 MG Tab tablet Take 1 Tablet by mouth 2 times a day with meals.holdDisp-60 Tablet, R-3, Normal      diphenhydrAMINE (BENADRYL) 25 MG Tab Take 25-50 mg by mouth 2 times a day. Scheduled    25 mg = AM  50 MG = PM, Historical Med      Melatonin 10 MG Tab Take 10 mg by mouth at bedtime., Historical Med      !! spironolactone (ALDACTONE) 25 MG Tab Take 25 mg by mouth every day., Historical Med      adalimumab (HUMIRA) 80 MG/0.8ML injection Inject 80 mg under the skin every 14 days., Historical Med      !! gabapentin (NEURONTIN) 400 MG Cap Take 1,200 mg by mouth 3 times a day. 3 capsules=1200mg, Historical Med      omeprazole (PRILOSEC) 20 MG delayed-release capsule Take 20 mg by mouth 2 times a day., Historical Med       !! - Potential duplicate medications found. Please discuss with provider.          ALLERGIES  Cefdinir, Depakote [divalproex sodium], Doxycycline, Montelukast [singulair], Vancomycin, Wound dressing adhesive, Amitriptyline, Amoxicillin, Aripiprazole [abilify], Clindamycin, Erythromycin, Flomax [tamsulosin hydrochloride], Hydromorphone, Levaquin, Metformin, Sulfa drugs, Tamsulosin, Tape, Sulfamethoxazole w-trimethoprim, Ciprofloxacin, Keflex, Levofloxacin, and Metronidazole    PHYSICAL EXAM  BP (!) 162/108   Pulse 97   Temp 36.6 °C (97.9 °F) (Temporal)   Resp 20   Wt 120 kg (265 lb)   SpO2 99%   BMI 45.49 kg/m²     Constitutional: Well developed, Well nourished, No acute distress, Non-toxic appearance.   HENT: Normocephalic, Atraumatic, Bilateral external ears normal. No nasal discharge.   Eyes: Pupils are equal round and reactive, EOMI, Conjunctiva normal, No discharge.   Neck: Normal range of motion, No tenderness, Supple,   Cardiovascular: Regular rate and rhythm without murmur rub or gallop. 2+ DP and PE pulses  Thorax & Lungs: Clear breath sounds bilaterally without wheezes, rhonchi or rales. There is no chest wall tenderness.   Abdomen: Soft non-tender  non-distended.   Skin: Normal without rash.   Back: No CVA or spinal tenderness.   Extremities: Intact distal pulses, Tenderness to palpation over bilateral Achilles tendon with no signs of swelling, 2+ DP and PE pulses, intact plantar flexion bilaterally, No cyanosis, No clubbing. Capillary refill is less than 2 seconds.  Musculoskeletal: Good range of motion in all major joints. No major deformities noted.   Neurologic: Alert & oriented x 3, Normal motor function, Normal sensory function, No focal deficits noted. Reflexes are normal.  Psychiatric: Affect normal, Judgment normal, Mood normal. There is no suicidal ideation or patient reported hallucinations.    COURSE & MEDICAL DECISION MAKING     ASSESSMENT, COURSE AND PLAN  Care Narrative:       8:11 PM Patient presents to the ED with bilateral lower leg pain.  Was recently seen at orthopedic urgent care and diagnosed with bilateral Achilles tendinitis.  History of Annetta-Danlos.  Patient denies any recent injury, has been compliant with her anti-inflammatories, wearing her boots.  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  She denies any new swelling in her extremities.  On exam patient has some tenderness palpation over the bilateral Achilles, intact plantarflexion, intact pulses.  I doubt acute bilateral DVTs at this time I think patient's symptoms are consistent with her tendinitis.  Will treat patient's pain here given work note.  discussed the plan of care with the patient including providing the patient with medications as well as providing her with a work note to ensure that she rests to potentially decrease her pain. Discussed not performing imaging due to the patient being seen in the past and not having any changes with her condition to which the patient agrees. The patient was able to ask questions at this time. Patient agrees  with the plan of care. Patient will be treated with Percocet 325 mg.    8:45 PM - I reevaluated the patient at bedside. The patient informs me they feel improved following Percocet administration. I discussed plan for discharge and follow up as outlined below. The patient is stable for discharge at this time and will return for any new or worsening symptoms. Patient verbalizes understanding and support with my plan for discharge.     ADDITIONAL PROBLEM LIST  None    DISPOSITION AND DISCUSSIONS  I have discussed management of the patient with the following physicians and ARIES's:  None    Discussion of management with other Q or appropriate source(s): None     Escalation of care considered, and ultimately not performed: Laboratory analysis and diagnostic imaging.    Barriers to care at this time, including but not limited to:  None present .     Decision tools and prescription drugs considered including, but not limited to:  None .    The patient will return for new or worsening symptoms and is stable at the time of discharge.    DISPOSITION:  Patient will be discharged home in stable condition.     FOLLOW UP:  Fly Goodson M.D.  [address] [place] [id number]  [phone]          FINAL DIAGNOSIS  1. Achilles tendinitis of both lower extremities Acute      ISara (Scribe), am scribing for, and in the presence of, Liban Chavez M.D..    Electronically signed by: Sara Joseph (Scribe), 12/11/2024    Liban RODRIGUEZ M.D. personally performed the services described in this documentation, as scribed by Sara Joseph in my presence, and it is both accurate and complete.    The note accurately reflects work and decisions made by me.  Liban Chavez M.D.  12/12/2024  12:11 AM

## 2024-12-12 NOTE — ED TRIAGE NOTES
Chief Complaint   Patient presents with    Leg Pain     Bilateral achilles tendon pain. Pt sprained both achilles tendons. Pt presents in bilateral boots from the [facility]       Pt ambulatory into triage for above with cane. Pt states she is on her feet all day at work. Pt has been taking medications to help with the pain but nothing is relieving it. Pt aox4 gcs 15          BP (!) 162/108   Pulse 97   Temp 36.6 °C (97.9 °F) (Temporal)   Resp 20   Wt 120 kg (265 lb)   SpO2 99%   BMI 45.49 kg/m²

## 2024-12-13 ENCOUNTER — APPOINTMENT (OUTPATIENT)
Dept: RADIOLOGY | Facility: MEDICAL CENTER | Age: 34
End: 2024-12-13
Attending: ORTHOPAEDIC SURGERY
Payer: MEDICARE

## 2024-12-18 ENCOUNTER — OFFICE VISIT (OUTPATIENT)
Dept: MEDICAL GROUP | Facility: MEDICAL CENTER | Age: 34
End: 2024-12-18
Payer: MEDICARE

## 2024-12-18 ENCOUNTER — HOSPITAL ENCOUNTER (OUTPATIENT)
Dept: LAB | Facility: MEDICAL CENTER | Age: 34
End: 2024-12-18
Attending: STUDENT IN AN ORGANIZED HEALTH CARE EDUCATION/TRAINING PROGRAM
Payer: MEDICARE

## 2024-12-18 ENCOUNTER — PHARMACY VISIT (OUTPATIENT)
Dept: PHARMACY | Facility: MEDICAL CENTER | Age: 34
End: 2024-12-18
Payer: COMMERCIAL

## 2024-12-18 ENCOUNTER — HOSPITAL ENCOUNTER (OUTPATIENT)
Dept: RADIOLOGY | Facility: MEDICAL CENTER | Age: 34
End: 2024-12-18
Attending: STUDENT IN AN ORGANIZED HEALTH CARE EDUCATION/TRAINING PROGRAM
Payer: MEDICARE

## 2024-12-18 VITALS
HEART RATE: 82 BPM | HEIGHT: 64 IN | RESPIRATION RATE: 23 BRPM | BODY MASS INDEX: 45.24 KG/M2 | SYSTOLIC BLOOD PRESSURE: 116 MMHG | OXYGEN SATURATION: 97 % | TEMPERATURE: 97.1 F | WEIGHT: 265 LBS | DIASTOLIC BLOOD PRESSURE: 70 MMHG

## 2024-12-18 DIAGNOSIS — Z79.1 NSAID LONG-TERM USE: ICD-10-CM

## 2024-12-18 DIAGNOSIS — K21.9 GASTROESOPHAGEAL REFLUX DISEASE WITHOUT ESOPHAGITIS: ICD-10-CM

## 2024-12-18 DIAGNOSIS — K31.84 GASTROPARESIS: ICD-10-CM

## 2024-12-18 DIAGNOSIS — M76.61 ACHILLES TENDONITIS, BILATERAL: ICD-10-CM

## 2024-12-18 DIAGNOSIS — R14.0 BLOATED ABDOMEN: ICD-10-CM

## 2024-12-18 DIAGNOSIS — M76.62 ACHILLES TENDONITIS, BILATERAL: ICD-10-CM

## 2024-12-18 LAB
ALBUMIN SERPL BCP-MCNC: 4.8 G/DL (ref 3.2–4.9)
ALBUMIN/GLOB SERPL: 2.7 G/DL
ALP SERPL-CCNC: 78 U/L (ref 30–99)
ALT SERPL-CCNC: 43 U/L (ref 2–50)
ANION GAP SERPL CALC-SCNC: 16 MMOL/L (ref 7–16)
AST SERPL-CCNC: 25 U/L (ref 12–45)
BASOPHILS # BLD AUTO: 1 % (ref 0–1.8)
BASOPHILS # BLD: 0.07 K/UL (ref 0–0.12)
BILIRUB SERPL-MCNC: 0.3 MG/DL (ref 0.1–1.5)
BUN SERPL-MCNC: 17 MG/DL (ref 8–22)
CALCIUM ALBUM COR SERPL-MCNC: 8.7 MG/DL (ref 8.5–10.5)
CALCIUM SERPL-MCNC: 9.3 MG/DL (ref 8.5–10.5)
CHLORIDE SERPL-SCNC: 109 MMOL/L (ref 96–112)
CO2 SERPL-SCNC: 17 MMOL/L (ref 20–33)
CREAT SERPL-MCNC: 0.73 MG/DL (ref 0.5–1.4)
EOSINOPHIL # BLD AUTO: 0.3 K/UL (ref 0–0.51)
EOSINOPHIL NFR BLD: 4.4 % (ref 0–6.9)
ERYTHROCYTE [DISTWIDTH] IN BLOOD BY AUTOMATED COUNT: 42 FL (ref 35.9–50)
GFR SERPLBLD CREATININE-BSD FMLA CKD-EPI: 110 ML/MIN/1.73 M 2
GLOBULIN SER CALC-MCNC: 1.8 G/DL (ref 1.9–3.5)
GLUCOSE SERPL-MCNC: 114 MG/DL (ref 65–99)
HCT VFR BLD AUTO: 40.9 % (ref 37–47)
HGB BLD-MCNC: 14.3 G/DL (ref 12–16)
IMM GRANULOCYTES # BLD AUTO: 0.03 K/UL (ref 0–0.11)
IMM GRANULOCYTES NFR BLD AUTO: 0.4 % (ref 0–0.9)
LYMPHOCYTES # BLD AUTO: 2.22 K/UL (ref 1–4.8)
LYMPHOCYTES NFR BLD: 32.6 % (ref 22–41)
MCH RBC QN AUTO: 32.1 PG (ref 27–33)
MCHC RBC AUTO-ENTMCNC: 35 G/DL (ref 32.2–35.5)
MCV RBC AUTO: 91.7 FL (ref 81.4–97.8)
MONOCYTES # BLD AUTO: 0.72 K/UL (ref 0–0.85)
MONOCYTES NFR BLD AUTO: 10.6 % (ref 0–13.4)
NEUTROPHILS # BLD AUTO: 3.47 K/UL (ref 1.82–7.42)
NEUTROPHILS NFR BLD: 51 % (ref 44–72)
NRBC # BLD AUTO: 0 K/UL
NRBC BLD-RTO: 0 /100 WBC (ref 0–0.2)
PLATELET # BLD AUTO: 213 K/UL (ref 164–446)
PMV BLD AUTO: 11.8 FL (ref 9–12.9)
POTASSIUM SERPL-SCNC: 4.1 MMOL/L (ref 3.6–5.5)
PROT SERPL-MCNC: 6.6 G/DL (ref 6–8.2)
RBC # BLD AUTO: 4.46 M/UL (ref 4.2–5.4)
SODIUM SERPL-SCNC: 142 MMOL/L (ref 135–145)
WBC # BLD AUTO: 6.8 K/UL (ref 4.8–10.8)

## 2024-12-18 PROCEDURE — RXMED WILLOW AMBULATORY MEDICATION CHARGE: Performed by: STUDENT IN AN ORGANIZED HEALTH CARE EDUCATION/TRAINING PROGRAM

## 2024-12-18 PROCEDURE — 80053 COMPREHEN METABOLIC PANEL: CPT

## 2024-12-18 PROCEDURE — 3078F DIAST BP <80 MM HG: CPT | Performed by: STUDENT IN AN ORGANIZED HEALTH CARE EDUCATION/TRAINING PROGRAM

## 2024-12-18 PROCEDURE — 85025 COMPLETE CBC W/AUTO DIFF WBC: CPT

## 2024-12-18 PROCEDURE — 74018 RADEX ABDOMEN 1 VIEW: CPT

## 2024-12-18 PROCEDURE — 99214 OFFICE O/P EST MOD 30 MIN: CPT | Performed by: STUDENT IN AN ORGANIZED HEALTH CARE EDUCATION/TRAINING PROGRAM

## 2024-12-18 PROCEDURE — 3074F SYST BP LT 130 MM HG: CPT | Performed by: STUDENT IN AN ORGANIZED HEALTH CARE EDUCATION/TRAINING PROGRAM

## 2024-12-18 PROCEDURE — 36415 COLL VENOUS BLD VENIPUNCTURE: CPT

## 2024-12-18 RX ORDER — OXYCODONE AND ACETAMINOPHEN 5; 325 MG/1; MG/1
1 TABLET ORAL EVERY 8 HOURS PRN
Qty: 7 TABLET | Refills: 0 | Status: SHIPPED | OUTPATIENT
Start: 2024-12-18 | End: 2025-01-08

## 2024-12-18 RX ORDER — SUCRALFATE ORAL 1 G/10ML
1 SUSPENSION ORAL 4 TIMES DAILY
Qty: 473 ML | Refills: 3 | Status: SHIPPED | OUTPATIENT
Start: 2024-12-18

## 2024-12-18 ASSESSMENT — ENCOUNTER SYMPTOMS
FEVER: 0
WHEEZING: 0
WEIGHT LOSS: 0
PALPITATIONS: 0
CHILLS: 0
DIZZINESS: 0
VOMITING: 0
HEADACHES: 0
NAUSEA: 0
SHORTNESS OF BREATH: 0

## 2024-12-18 ASSESSMENT — FIBROSIS 4 INDEX: FIB4 SCORE: 0.56

## 2024-12-18 NOTE — PROGRESS NOTES
Subjective:     CC:     HPI:   Kristin presents today with    PMH   # Ehler Danos syndrome,hypermobile  # HTN  # Restrictive lung disease (PFT 2019 wo significant bronchodilator response)  - has been on inhalers, with reported improvement  # migraine- ( rimegepant, emgality, topiramate, lamotrigine, sumatriptan),   # TONYA - intolerant of mask  # POTS/ SVT on ivabradine and metoprolol 25mg xr  # hidraadenitis supprativa on humira  # nexplanon  # chronic pain associated with EDS ( gabapentin, ibuprofen, acetaminophen),   # GERD/ gastroparesis related to EDS  # PCOS- spironolactone  # schizo on geodon  # hemorrhoids  # history of idiopathic intracranial hypertension  # hx of transverse myelitis vs functional neurologic disorder  # hx of optic neuritits previously on cellcept ([id number]) - no longer following with neuroophthalmology  # kapil 1:80 (2024), ccp 4 (2024), rheum factor <10 (2024)     Specialist  - cardiology - [facility]   - dermatology- [facility] dermatology  - on Humira for hidraadenitis   - gastroenterology - [facility], nafld, polyp, colonoscopy   - pain management/ neurology - [facility]  - neuromuscular neurology- [facility]  - ophthalmology- [facility] - EDS visual changes     Device  - interstim stimulator  - Hx of l4-l5 fusion      EDS monitoring/ work up  - 2022 Echocardiogram LVEF 55% - aortic root normal for BSA  - 2022 stress test   - follows with ophthalmology  - hx of spine surgery/ fusion   - CT cervical spine- No acute fracture or dislocation seen in the CT scan of the cervical spine.   - EGD- 2017 dysphagia, gastroparesis  - colonoscopy - 5 year recall    ==================================    Since last visit   - she has started following with occupational therapy  -11/23/2024 seen in ED for upper abdominal discomfort/nausea/vomiting nonbilious nonbloody.  CMP was fine hCG was negative CBC was without leukocytosis she was given IV fluids, UA was negative symptomatic treatment included dicyclomine promethazine and  sucralfate was prescribed.  -12/11/2024 presented for leg pain, bilateral Achilles tendon pain, sprained Achilles tendon.  Patient was seen at [facility] for diagnosis of Achilles tendinitis was prescribed meloxicam and recommended wearing brace for 2 weeks.  Percocet was prescribed    ===============    Verbal consent was acquired by the patient to use [employer] ambient listening note generation during this visit Yes   History of Present Illness  The patient presents for a 3-month follow-up.    She has been to the ER twice since her last visit, once for nausea and vomiting and another time for follow-up after visiting [facility] for Achilles tendinitis and further pain management. She reports experiencing severe abdominal pain for the past 3 days, accompanied by bloating. She does not have constipation or diarrhea, but she had multiple bowel movements yesterday with good quantity. Her appetite has diminished, and she experiences regurgitation. The pain has started to radiate into her bowels, causing significant discomfort when standing. She also reports chest pain. She has not made any significant dietary changes recently, except for consuming salads, vegetables, fruits, and nuts due to her work schedule. She suspects dehydration due to her inability to consume food. She has a history of gastroparesis and has previously undergone a procedure to cut the stomach muscle, which was initially effective until she contracted viral gastritis.      She has previously sought care at [facility], but her condition was not diagnosed at that time. She reports an enlarged spleen and experiences shaking if she misses a meal. She takes omeprazole as prescribed and has not found sucralfate to be beneficial. She has ondansetron and scopolamine patches at home for nausea and uses Reglan as needed.     She reports no presence of dark or black tarry stools.     She has been on naproxen for an extended period and takes opioids as needed  "when not working or driving.  She has tried dicyclomine, promethazine, Reglan suppositories, and sucralfate, but these have not provided significant relief. She finds sucralfate difficult to swallow and is unsure of the cause of her bloating. She does not believe it is due to gas as she experiences belching and flatulence. Her last bowel movement was yesterday, and she also had one the day before. She has active hemorrhoids and is uncertain of their cause. She has not found sucralfate to be significantly helpful and has enough nausea medication at home. She has Compazine at home and uses it sparingly due to its cost. She has not found dicyclomine to be effective. She consumes yogurt daily.    She also reports foot pain, which is not alleviated by her boots. She has to work for 6 hours on her feet today. She has discontinued meloxicam due to its ineffectiveness and has resumed taking naproxen, tizanidine, and Tylenol. She has been on these medications for some time. She takes Percocet as needed when not working.              Health Maintenance:     ROS:  Review of Systems   Constitutional:  Negative for chills, fever and weight loss.   HENT:  Negative for hearing loss.    Respiratory:  Negative for shortness of breath and wheezing.    Cardiovascular:  Negative for chest pain and palpitations.   Gastrointestinal:  Negative for nausea and vomiting.   Genitourinary:  Negative for frequency and urgency.   Skin:  Negative for rash.   Neurological:  Negative for dizziness and headaches.       Objective:     Exam:  /70 (BP Location: Left arm)   Pulse 82   Temp 36.2 °C (97.1 °F)   Resp (!) 23   Ht 1.626 m (5' 4\")   Wt 120 kg (265 lb)   SpO2 97%   BMI 45.49 kg/m²  Body mass index is 45.49 kg/m².    Physical Exam  Constitutional:       Appearance: Normal appearance.   Cardiovascular:      Rate and Rhythm: Normal rate and regular rhythm.   Pulmonary:      Effort: Pulmonary effort is normal.      Breath sounds: " Normal breath sounds.   Abdominal:      General: There is no distension.      Palpations: Abdomen is soft.      Tenderness: There is abdominal tenderness (CHAYO quadrant).   Musculoskeletal:      Cervical back: Normal range of motion and neck supple.   Neurological:      Mental Status: She is alert.           Labs:     Assessment & Plan:     34 y.o. female with the following -   1. Bloated abdomen  3. Gastroparesis  4. Gastroesophageal reflux disease without esophagitis  Chronic, stable  The setting of gastroparesis and was Down syndrome she reports she is currently adherent to omeprazole 20 mg daily will increase to omeprazole 40 mg twice daily.  In the setting that she is currently taking naproxen at higher frequency in the setting of Achilles tendinitis.  -Patient is report regular bowel movement.  - No fever or chills  - On examination there is tenderness with palpation of the left upper quadrant  - Vital signs stable  Plan  ER precaution given  Did discuss if symptoms worsen consider KUB or go to the ER  Will increase omeprazole  Will prescribe liquid sucralfate  Will provide short-term opiates for Achilles tendinitis so she can reduce ibuprofen use  Unfortunately patient is has significant allergy list previously had tried nerve medication with adverse effect  - [id number] VIEW; Future  - CBC WITH DIFFERENTIAL; Future  - Comp Metabolic Panel; Future  - sucralfate (CARAFATE) 1 GM/10ML Suspension; Take 10 mL by mouth 4 times a day.  Dispense: 473 mL; Refill: 3    2. Achilles tendonitis, bilateral  5. NSAID long-term use  Chronic, stable  Currently using naproxen acetaminophen  Discussed I think increased frequency of naproxen might be causing her epigastric/left upper quadrant pain.  She is having regular bowel movement without evidence of blood or black tarry stool at this time.  Will increase omeprazole, provide multimodal therapy    - oxyCODONE-acetaminophen (PERCOCET) 5-325 MG Tab; Take 1 Tablet by mouth  every 8 hours as needed for Severe Pain for up to 21 days.  Dispense: 7 Tablet; Refill: 0  - Consent for Opiate Prescription        Return in about 3 months (around 3/18/2025) for Lab review, Med check.    Please note that this dictation was created using voice recognition software. I have made every reasonable attempt to correct obvious errors, but I expect that there are errors of grammar and possibly content that I did not discover before finalizing the note.

## 2024-12-19 PROCEDURE — RXMED WILLOW AMBULATORY MEDICATION CHARGE: Performed by: STUDENT IN AN ORGANIZED HEALTH CARE EDUCATION/TRAINING PROGRAM

## 2024-12-20 ENCOUNTER — PHARMACY VISIT (OUTPATIENT)
Dept: PHARMACY | Facility: MEDICAL CENTER | Age: 34
End: 2024-12-20
Payer: COMMERCIAL

## 2024-12-23 ENCOUNTER — OFFICE VISIT (OUTPATIENT)
Dept: URGENT CARE | Facility: CLINIC | Age: 34
End: 2024-12-23
Payer: MEDICARE

## 2024-12-23 ENCOUNTER — HOSPITAL ENCOUNTER (EMERGENCY)
Facility: MEDICAL CENTER | Age: 34
End: 2024-12-23
Attending: STUDENT IN AN ORGANIZED HEALTH CARE EDUCATION/TRAINING PROGRAM
Payer: MEDICARE

## 2024-12-23 ENCOUNTER — PHARMACY VISIT (OUTPATIENT)
Dept: PHARMACY | Facility: MEDICAL CENTER | Age: 34
End: 2024-12-23
Payer: COMMERCIAL

## 2024-12-23 VITALS
TEMPERATURE: 97.1 F | OXYGEN SATURATION: 97 % | RESPIRATION RATE: 18 BRPM | DIASTOLIC BLOOD PRESSURE: 68 MMHG | HEART RATE: 76 BPM | BODY MASS INDEX: 45.75 KG/M2 | WEIGHT: 268 LBS | SYSTOLIC BLOOD PRESSURE: 110 MMHG | HEIGHT: 64 IN

## 2024-12-23 VITALS
BODY MASS INDEX: 45.92 KG/M2 | HEART RATE: 76 BPM | HEIGHT: 64 IN | OXYGEN SATURATION: 96 % | DIASTOLIC BLOOD PRESSURE: 72 MMHG | RESPIRATION RATE: 16 BRPM | SYSTOLIC BLOOD PRESSURE: 132 MMHG | TEMPERATURE: 97.6 F | WEIGHT: 268.96 LBS

## 2024-12-23 DIAGNOSIS — H65.191 ACUTE MEE (MIDDLE EAR EFFUSION), RIGHT: ICD-10-CM

## 2024-12-23 DIAGNOSIS — H60.501 ACUTE OTITIS EXTERNA OF RIGHT EAR, UNSPECIFIED TYPE: ICD-10-CM

## 2024-12-23 DIAGNOSIS — R29.810 FACIAL DROOP: ICD-10-CM

## 2024-12-23 DIAGNOSIS — G51.0 BELL'S PALSY: ICD-10-CM

## 2024-12-23 PROCEDURE — 99215 OFFICE O/P EST HI 40 MIN: CPT | Performed by: PHYSICIAN ASSISTANT

## 2024-12-23 PROCEDURE — 3074F SYST BP LT 130 MM HG: CPT | Performed by: PHYSICIAN ASSISTANT

## 2024-12-23 PROCEDURE — RXMED WILLOW AMBULATORY MEDICATION CHARGE: Performed by: STUDENT IN AN ORGANIZED HEALTH CARE EDUCATION/TRAINING PROGRAM

## 2024-12-23 PROCEDURE — 99282 EMERGENCY DEPT VISIT SF MDM: CPT

## 2024-12-23 PROCEDURE — 3078F DIAST BP <80 MM HG: CPT | Performed by: PHYSICIAN ASSISTANT

## 2024-12-23 RX ORDER — NEOMYCIN SULFATE, POLYMYXIN B SULFATE AND HYDROCORTISONE 10; 3.5; 1 MG/ML; MG/ML; [USP'U]/ML
4 SUSPENSION/ DROPS AURICULAR (OTIC) 4 TIMES DAILY
Qty: 10 ML | Refills: 0 | Status: ACTIVE | OUTPATIENT
Start: 2024-12-23 | End: 2024-12-31

## 2024-12-23 RX ORDER — PREDNISONE 50 MG/1
50 TABLET ORAL DAILY
Qty: 7 TABLET | Refills: 0 | Status: SHIPPED | OUTPATIENT
Start: 2024-12-23

## 2024-12-23 ASSESSMENT — ENCOUNTER SYMPTOMS
BLURRED VISION: 0
DOUBLE VISION: 0
CHILLS: 0
FEVER: 0

## 2024-12-23 ASSESSMENT — FIBROSIS 4 INDEX
FIB4 SCORE: 0.63
FIB4 SCORE: 0.63

## 2024-12-24 NOTE — PROGRESS NOTES
Subjective:   Kristin Balderrama is a 34 y.o. female who presents today with   Chief Complaint   Patient presents with    Otalgia    Numbness     In face     Headache    Sweats     X 1 week        Otalgia   There is pain in the right ear. This is a new problem. The current episode started in the past 7 days. The problem occurs constantly. The problem has been unchanged. There has been no fever. She has tried nothing for the symptoms. The treatment provided no relief.       Patient recently treated for sinus infection and referred to ENT on December 5.  Patient describes worsening of weakness of the right side of her face including some facial droop.  She describes feeling sweats and headache and numbness on the right side of the face.    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  MEDS:   Current Outpatient Medications:     sucralfate (CARAFATE) 1 GM/10ML Suspension, Take 10 mL by mouth 4 times a day., Disp: 473 mL, Rfl: 3    omeprazole (PRILOSEC) 20 MG delayed-release capsule, Take 2 Capsules by mouth 2 times a day., Disp: 180 Capsule, Rfl: 3     oxyCODONE-acetaminophen (PERCOCET) 5-325 MG Tab, Take 1 Tablet by mouth every 8 hours as needed for Severe Pain for up to 21 days., Disp: 7 Tablet, Rfl: 0    gabapentin (NEURONTIN) 400 MG Cap, TAKE 3 CAPSULES BY MOUTH THREE TIMES DAILY.  FOR 30 DAY SUPPLY. DX: M79.7, Disp: 270 Capsule, Rfl: 0    naproxen (NAPROSYN) 500 MG Tab, Take one tablet orally twice daily, Disp: 60 Tablet, Rfl: 0    tizanidine (ZANAFLEX) 4 MG Tab, Take 1 tablet orally 3 times daily, Disp: 90 Tablet, Rfl: 0    meloxicam (MOBIC) 15 MG tablet, Take 1 Tablet by mouth every day., Disp: 30 Tablet, Rfl: 0    fluconazole (DIFLUCAN) 150 MG tablet, Take 1 tablet Orally x1, Disp: 1 Tablet, Rfl: 0    nystatin (MYCOSTATIN) powder, Apply 1 application Externally Twice a day for 10 days, Disp: 30 g, Rfl: 1    cephALEXin (KEFLEX) 500 MG Cap, , Disp: , Rfl:     ketoconazole (NIZORAL) 2 % Cream, APPLY TO AFFECTED AREA EVERY DAY AS NEEDED FOR RASH, Disp: , Rfl:     loperamide (IMODIUM) 2 MG Cap, TAKE 1 CAPSULE BY MOUTH FOUR TIMES A DAY AS NEEDED FOR DIARRHEA, Disp: , Rfl:     ondansetron (ZOFRAN ODT) 4 MG TABLET DISPERSIBLE, DISSOLVE 1 TABLET ON THE TONGUE EVERY 8 HOURS FOR 5 DAYS AS NEEDED FOR NAUSEA/VOMITING, Disp: , Rfl:     traMADol (ULTRAM) 50 MG Tab, TAKE 1 TABLET BY MOUTH EVERY 6 HOURS AS NEEDED FOR MODERATE PAIN FOR UP TO 5 DAYS., Disp: , Rfl:     topiramate (TOPAMAX) 50 MG tablet, Take 1 Tablet by mouth 2 times a day., Disp: 180 Tablet, Rfl: 4    prochlorperazine (COMPAZINE) 10 MG Tab, Take 1 Tablet by mouth every 6 hours as needed for Nausea/Vomiting., Disp: 30 Tablet, Rfl: 0    ciprofloxacin/dexamethasone (CIPRODEX) 0.3-0.1 % Suspension, Administer 4 Drops into affected ear(s) 2 times a day., Disp: 7.5 mL, Rfl: 0    clindamycin 1 % Gel, Apply topically once or twice a day to underarms/face as needed for outbreak, Disp: 60 g, Rfl: 3    tizanidine (ZANAFLEX) 4 MG capsule, take 1 Tablet Three Times A Day as needed Qty 90  Dx.M54.16, Disp: 90  Capsule, Rfl: 0    scopolamine (TRANSDERM SCOP, 1.5 MG,) 1 mg/72hr PATCH 72 HR, Place 1 Patch on the skin every 72 hours., Disp: 7 Patch, Rfl: 1    ipratropium-albuterol (COMBIVENT RESPIMAT)  MCG/ACT Aero Soln, Inhale 2 Puffs 4 times a day as needed (shortness of breath, wheezing)., Disp: 4 g, Rfl: 1    albuterol (PROVENTIL) 2.5mg/3ml Nebu Soln solution for nebulization, Take 3 mL by nebulization every four hours as needed for Shortness of Breath., Disp: 75 mL, Rfl: 0    ziprasidone (GEODON) 40 MG Cap, Take 1 capsule by mouth at bedtime., Disp: 90 Capsule, Rfl: 1    amLODIPine (NORVASC) 5 MG Tab, Take 1 tablet by mouth every day., Disp: 90 Tablet, Rfl: 1    metoprolol SR (TOPROL XL) 25 MG TABLET SR 24 HR, Take 1 Tablet by mouth 2 times a day., Disp: 200 Tablet, Rfl: 3    Galcanezumab-gnlm (EMGALITY) 120 MG/ML Solution Auto-injector, Inject 1 mL subcutaneously every month., Disp: 1 mL, Rfl: 11    lamoTRIgine (LAMICTAL) 25 MG Tab, Take 2 Tablets by mouth 2 times a day., Disp: 360 Tablet, Rfl: 1    Rimegepant Sulfate (NURTEC) 75 MG TABLET DISPERSIBLE, Take 1 tablet by mouth at onset of migraine or aura okay to repeat in 24 hours if needed., Disp: 8 Tablet, Rfl: 5    sumatriptan (IMITREX) 20 MG/ACT nasal spray, Give 1 dose at onset headache okay to repeat in 2 hours if needed for max of 2 doses in a 24 hour timeframe., Disp: 6 Each, Rfl: 5    ivabradine (CORLANOR) 7.5 MG Tab tablet, Take 1 Tablet by mouth 2 times a day with meals., Disp: 60 Tablet, Rfl: 3    diphenhydrAMINE (BENADRYL) 25 MG Tab, Take 25-50 mg by mouth 2 times a day. Scheduled  25 mg = AM 50 MG = PM, Disp: , Rfl:     Melatonin 10 MG Tab, Take 10 mg by mouth at bedtime., Disp: , Rfl:     spironolactone (ALDACTONE) 25 MG Tab, Take 25 mg by mouth every day., Disp: , Rfl:     gabapentin (NEURONTIN) 400 MG Cap, Take 1,200 mg by mouth 3 times a day. 3 capsules=1200mg, Disp: , Rfl:     doxycycline (VIBRAMYCIN) 100 MG Cap, TAKE 1 CAPSULE BY MOUTH TWICE A  "DAY FOR 10 DAYS (Patient not taking: Reported on 11/4/2024), Disp: , Rfl:     LORazepam (ATIVAN) 1 MG Tab, , Disp: , Rfl:     oxyCODONE immediate-release (ROXICODONE) 5 MG Tab, TAKE 2 TABLETS BY MOUTH 2 TIMES A DAY FOR 4 DAYS (Patient not taking: Reported on 11/4/2024), Disp: , Rfl:     oxyCODONE-acetaminophen (PERCOCET) 5-325 MG Tab, TAKE 1 TABLET BY MOUTH EVERY 6 HOURS FOR 5 DAYS (Patient not taking: Reported on 11/4/2024), Disp: , Rfl:     phenazopyridine (PYRIDIUM) 200 MG Tab, Take 1 tablet 3 times a day by oral route for 5 days. (Patient not taking: Reported on 11/4/2024), Disp: , Rfl:     adalimumab (HUMIRA) 80 MG/0.8ML injection, Inject 80 mg under the skin every 14 days. (Patient not taking: Reported on 11/4/2024), Disp: , Rfl:   ALLERGIES:   Allergies   Allergen Reactions    Cefdinir Shortness of Breath and Itching     Tolerated 1/18/17  Tolerates ceftriaxone  Tolerated augmentin 8/2019     Depakote [Divalproex Sodium] Unspecified     Muscle spasms/muscle pain and weakness      Doxycycline Anaphylaxis and Vomiting     Other reaction(s): pustules/blisters  Other reaction(s): stomach pain    Montelukast [Singulair] Unspecified     Cardiac effusion    Vancomycin Itching     Pt becomes flushed in face and chest.   RXN=7/10/16    Wound Dressing Adhesive Rash     By pt report-\"removes skin totally off\"    Amitriptyline Unspecified     Headaches      Amoxicillin Rash      Tolerates augmentin    Aripiprazole [Abilify] Unspecified     Headaches/muscle twitching      Clindamycin Nausea         Other reaction(s): nausea stomach pain    Erythromycin Rash     .  Other reaction(s): nausea stomach pain    Flomax [Tamsulosin Hydrochloride] Swelling    Hydromorphone      Other reaction(s): vomiting    Levaquin Unspecified     Severe muscle cramps in legs  RXN=unknown  Other reaction(s): leg muscle cramps    Metformin Unspecified     Increased lactic acid     Other reaction(s): itching and rash/nausea vomiting    Sulfa Drugs " "Hives, Itching, Myalgia and Unspecified     Muscle pain and weakness    Other reaction(s): unknown    Tamsulosin Swelling     Swelling of legs    Tape Rash     Tears skin off  coban with Tegaderm tape ok intermittently  RXN=ongoing    Sulfamethoxazole W-Trimethoprim Rash    Ciprofloxacin Rash          Keflex Rash     Pt states she gets a rash with this medication  Tolerates ceftriaxone  Can take with Benadryl    Levofloxacin Unspecified     Leg muscle cramps    Metronidazole Rash     \"Vision problems\"  Other reaction(s): vision problems     SURGHX:   Past Surgical History:   Procedure Laterality Date    NC CYSTOSCOPY,INSERT URETERAL STENT Right 2/12/2024    Procedure: CYSTOSCOPY, WITH RIGHT URETEROSCOPY, WITH LITHOTRIPSY, WITH INSERTION OF RIGHT URETERAL STENT;  Surgeon: Josafat Roberson M.D.;  Location: [facility];  Service: Urology    NC CYSTO/URETERO/PYELOSCOPY, DX Right 2/12/2024    Procedure: URETEROSCOPY;  Surgeon: Josafat Roberson M.D.;  Location: [facility];  Service: Urology    LASERTRIPSY Right 2/12/2024    Procedure: LITHOTRIPSY, USING LASER;  Surgeon: Josafat Roberson M.D.;  Location: [facility];  Service: Urology    INGUINAL HERNIA LAPAROSCOPIC Right 07/21/2023    Procedure: LAPAROSCOPIC RIGHT INGUINAL HERNIA REPAIR WITH MESH;  Surgeon: Joe Noyola M.D.;  Location: [facility];  Service: General    HERNIA REPAIR Right 07/21/2023    PB PERCUT FIX PBOX/NECK FEMUR FX Left 01/28/2023    Procedure: FIXATION, HIP, USING CANNULATED SCREW;  Surgeon: Noman Abdul M.D.;  Location: [facility];  Service: Orthopedics    NC LAP,DIAGNOSTIC ABDOMEN  02/14/2022    Procedure: LAPAROSCOPY;  Surgeon: Seamus Pisano M.D.;  Location: SURGERY SAME DAY [facility];  Service: Gynecology    OVARIAN CYSTECTOMY Right 02/14/2022    Procedure: EXCISION, CYST, OVARY;  Surgeon: Seamus Pisano M.D.;  Location: SURGERY SAME DAY [facility];  Service: Gynecology    BOWEL STIMULATOR " INSERTION  03/10/2021    Procedure: INSERTION, ELECTRODE LEADS AND PULSE GENERATOR, NEUROSTIMULATOR, SACRAL - REMOVAL OF INTERSTIM WITH REPLACEMENT OF SACRAL NEUROMODULATION DEVICE;  Surgeon: Joe Noyola M.D.;  Location: SURGERY [facility];  Service: General    MUSCLE BIOPSY Right 01/26/2017    Procedure: MUSCLE BIOPSY - THIGH;  Surgeon: Isidro Vigil M.D.;  Location: SURGERY [facility];  Service:     GASTROSCOPY WITH BALLOON DILATATION N/A 01/04/2017    Procedure: GASTROSCOPY WITH DILATATION;  Surgeon: Torres Vargas M.D.;  Location: SURGERY [facility];  Service:     BOWEL STIMULATOR INSERTION  07/13/2016    Procedure: BOWEL STIMULATOR INSERTION FOR PERMANENT INTERSTIM SACRAL IMPLANT;  Surgeon: Joe Noyola M.D.;  Location: SURGERY [facility];  Service:     RECOVERY  01/27/2016    Procedure: CATH LAB EP STUDY WITH SINUS NODE MODIFICATION LA;  Surgeon: [facility];  Location: SURGERY PRE-POST PROC UNIT [facility];  Service:     OTHER CARDIAC SURGERY  01/2016    cardiac ablation    NEURO DEST FACET L/S W/IG SNGL  04/21/2015    Performed by Reza Tabor at SURGERY [facility]S    LUMBAR FUSION ANTERIOR  08/21/2012    Performed by SUSIE SAWANT at SURGERY [facility] ORS    ALYSSA BY LAPAROSCOPY  08/29/2010    Performed by SHAYY JOHNS at SURGERY [facility] ORS    LAMINOTOMY      OTHER ABDOMINAL SURGERY      TONSILLECTOMY      tonsillectomy     SOCHX:  reports that she has never smoked. She has never used smokeless tobacco. She reports that she does not drink alcohol and does not use drugs.  FH: Reviewed with patient, not pertinent to this visit.       Review of Systems   Constitutional:  Negative for chills and fever.   HENT:  Positive for ear pain.    Eyes:  Negative for blurred vision and double vision.   Neurological:  Positive for headaches. Negative for sensory change.        Objective:   /68   Pulse 76   Temp 36.2 °C (97.1 °F) (Temporal)   Resp 18   Ht 1.626 m (5'  "4\")   Wt 122 kg (268 lb)   SpO2 97%   BMI 46.00 kg/m²   Physical Exam  Vitals and nursing note reviewed.   Constitutional:       General: She is not in acute distress.     Appearance: Normal appearance. She is well-developed. She is not ill-appearing or toxic-appearing.   HENT:      Head: Normocephalic and atraumatic.      Right Ear: Hearing normal.      Left Ear: Hearing normal.   Eyes:      Extraocular Movements: Extraocular movements intact.      Pupils: Pupils are equal, round, and reactive to light.   Cardiovascular:      Rate and Rhythm: Normal rate and regular rhythm.      Heart sounds: Normal heart sounds.   Pulmonary:      Effort: Pulmonary effort is normal.      Breath sounds: Normal breath sounds.   Musculoskeletal:      Comments: Normal movement in all 4 extremities   Skin:     General: Skin is warm and dry.   Neurological:      Mental Status: She is alert and oriented to person, place, and time.      GCS: GCS eye subscore is 4. GCS verbal subscore is 5. GCS motor subscore is 6.      Cranial Nerves: Facial asymmetry present.      Coordination: Coordination normal.      Comments: Patient unable to raise her right eyebrow and smile is not equal bilaterally drooping on the right side.   Psychiatric:         Mood and Affect: Mood normal.         Assessment/Plan:   Assessment    1. Facial droop    2. Non-recurrent acute suppurative otitis media of right ear without spontaneous rupture of tympanic membrane    Recommend giving worsening symptoms throughout today and neurodeficits noted on exam I recommend going to the ER for higher level of care and evaluation and patient agrees with this plan.  Facial deficits noted on the right side but no other gross motor deficits appreciated on the right side.  Discussed with patient possibility of Bell's palsy but also recurrent ear issues.  She agrees with this plan and elects to go by private vehicle to the ER.  I called transfer center and provided " report.      Please note that this dictation was created using voice recognition software. I have made every reasonable attempt to correct obvious errors, but I expect that there are errors of grammar and possibly content that I did not discover before finalizing the note.    Rosalio Green PA-C

## 2024-12-24 NOTE — DISCHARGE INSTRUCTIONS
Be sure to try and close your eye at night with tape if you are unable to do so use the eyedrops frequently to prevent drying of your eye.  Take the steroids until they are gone use a antibiotic drops as instructed and return with other concerns.

## 2024-12-24 NOTE — ED PROVIDER NOTES
CHIEF COMPLAINT  Chief Complaint   Patient presents with    Ear Pain     Patient reports R ear pain x 1 week. Patient reports history of recent sinus infection that was treated on 12/5.     Headache     Patient reports pain to the R side of her head with some numbness x 1 week.     Sent from Urgent Care     Patient sent from  for concern of possible Bell's Palsy with patient reported R side facial numbness and facial droop. No facial droop noted in triage.        LIMITATION TO HISTORY   Select: None    HPI    Kristin Balderrama is a 34 y.o. female who presents to the Emergency Department patient presented as a transfer from urgent care over concerns of a right-sided facial droop which Started today.  Was noted to have right-sided facial droop, decreased movement of h her eyebrow.  No other of focal deficits no headaches, no visual changes no numbness tingling weakness in any extremities.  Had a secondary complaint of ear pain which has been ongoing for the past week.    OUTSIDE HISTORIAN(S):  Select: None    EXTERNAL RECORDS REVIEWED  Select: Other urgent care note sent over concerns of Bell's palsy      PAST MEDICAL HISTORY  Past Medical History:   Diagnosis Date    Abdominal pain     Anginal syndrome     Random chest pain especially with tachycardia    Apnea, sleep     Arthritis     ASTHMA     when around smoke    Back pain     Borderline personality disorder (HCC)     Chickenpox     Chronic UTI 09/18/2010    Daytime sleepiness     Dental disorder 03/08/2021    Infected tooth    Depression     Diabetes (HCC)     Diarrhea     Incontinece    Disorder of thyroid     Hashimoto's    Fatigue     Frequent headaches     Heart burn     History of falling     Inappropriate sinus tachycardia (HCC) 2016    Statusp post ablation by Dr. Kumar.    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    Urinary bladder disorder     Suprapubic cath. 2/8/22: Not anymore.     Urinary incontinence     Weakness     Wears glasses      .    SURGICAL HISTORY  Past Surgical History:   Procedure Laterality Date    SD CYSTOSCOPY,INSERT URETERAL STENT Right 2/12/2024    Procedure: CYSTOSCOPY, WITH RIGHT URETEROSCOPY, WITH LITHOTRIPSY, WITH INSERTION OF RIGHT URETERAL STENT;  Surgeon: Josafat Roberson M.D.;  Location: [facility];  Service: Urology    SD CYSTO/URETERO/PYELOSCOPY, DX Right 2/12/2024    Procedure: URETEROSCOPY;  Surgeon: Josafat Roberson M.D.;  Location: SURGERY [facility];  Service: Urology    LASERTRIPSY Right 2/12/2024    Procedure: LITHOTRIPSY, USING LASER;  Surgeon: Josafat Roberson M.D.;  Location: SURGERY [facility];  Service: Urology    INGUINAL HERNIA LAPAROSCOPIC Right 07/21/2023    Procedure: LAPAROSCOPIC RIGHT INGUINAL HERNIA REPAIR WITH MESH;  Surgeon: Joe Noyola M.D.;  Location: [facility];  Service: General    HERNIA REPAIR Right 07/21/2023    PB PERCUT FIX PBOX/NECK FEMUR FX Left 01/28/2023    Procedure: FIXATION, HIP, USING CANNULATED SCREW;  Surgeon: Noman Abdul M.D.;  Location: SURGERY [facility];  Service: Orthopedics    SD LAP,DIAGNOSTIC ABDOMEN  02/14/2022    Procedure: LAPAROSCOPY;  Surgeon: Seamus Pisano M.D.;  Location: SURGERY SAME DAY [facility];  Service: Gynecology    OVARIAN CYSTECTOMY Right 02/14/2022    Procedure: EXCISION, CYST, OVARY;  Surgeon: Seamus Pisano M.D.;  Location: SURGERY SAME DAY " [facility];  Service: Gynecology    BOWEL STIMULATOR INSERTION  03/10/2021    Procedure: INSERTION, ELECTRODE LEADS AND PULSE GENERATOR, NEUROSTIMULATOR, SACRAL - REMOVAL OF INTERSTIM WITH REPLACEMENT OF SACRAL NEUROMODULATION DEVICE;  Surgeon: Joe Noyola M.D.;  Location: SURGERY [facility];  Service: General    MUSCLE BIOPSY Right 01/26/2017    Procedure: MUSCLE BIOPSY - THIGH;  Surgeon: Isidro Vigil M.D.;  Location: SURGERY [facility];  Service:     GASTROSCOPY WITH BALLOON DILATATION N/A 01/04/2017    Procedure: GASTROSCOPY WITH DILATATION;  Surgeon: Torres Vargas M.D.;  Location: SURGERY [facility];  Service:     BOWEL STIMULATOR INSERTION  07/13/2016    Procedure: BOWEL STIMULATOR INSERTION FOR PERMANENT INTERSTIM SACRAL IMPLANT;  Surgeon: Joe Noyola M.D.;  Location: SURGERY [facility];  Service:     RECOVERY  01/27/2016    Procedure: CATH LAB EP STUDY WITH SINUS NODE MODIFICATION LA;  Surgeon: [facility];  [place] [facility]  Service:     OTHER CARDIAC SURGERY  01/2016    cardiac ablation    NEURO DEST FACET L/S W/IG SNGL  04/21/2015    Performed by Reza Tabor at SURGERY [facility]    LUMBAR FUSION ANTERIOR  08/21/2012    Performed by SUSIE SAWANT at SURGERY [facility]    ALYSSA BY LAPAROSCOPY  08/29/2010    Performed by SHAYY JOHNS at SURGERY [facility]    LAMINOTOMY      OTHER ABDOMINAL SURGERY      TONSILLECTOMY      tonsillectomy         FAMILY HISTORY  Family History   Problem Relation Age of Onset    Hypertension Mother     Sleep Apnea Mother     Heart Disease Mother     Other Mother         hypothryod    No Known Problems Sister     Other Sister         Narcolepsy;fibromyalsia;bone;nerve    Genitourinary () Problems Sister         endometriosis    Hypertension Maternal Uncle     Heart Disease Maternal Grandmother     Hypertension Maternal Grandmother     Cancer Neg Hx           SOCIAL HISTORY  Social History      Socioeconomic History    Marital status: Single     Spouse name: Not on file    Number of children: Not on file    Years of education: Not on file    Highest education level: Not on file   Occupational History    Not on file   Tobacco Use    Smoking status: Never    Smokeless tobacco: Never   Vaping Use    Vaping status: Never Used   Substance and Sexual Activity    Alcohol use: No     Alcohol/week: 0.0 oz    Drug use: Never    Sexual activity: Not Currently     Birth control/protection: Implant   Other Topics Concern    Not on file   Social History Narrative    ** Merged History Encounter **          Social Drivers of Health     Financial Resource Strain: Medium Risk (4/30/2021)    Overall Financial Resource Strain (CARDIA)     Difficulty of Paying Living Expenses: Somewhat hard   Food Insecurity: No Food Insecurity (10/3/2024)    Hunger Vital Sign     Worried About Running Out of Food in the Last Year: Never true     Ran Out of Food in the Last Year: Never true   Transportation Needs: No Transportation Needs (10/3/2024)    PRAPARE - Transportation     Lack of Transportation (Medical): No     Lack of Transportation (Non-Medical): No   Physical Activity: Not on file   Stress: Not on file   Social Connections: Not on file   Intimate Partner Violence: Not At Risk (10/3/2024)    Humiliation, Afraid, Rape, and Kick questionnaire     Fear of Current or Ex-Partner: No     Emotionally Abused: No     Physically Abused: No     Sexually Abused: No   Housing Stability: Low Risk  (10/3/2024)    Housing Stability Vital Sign     Unable to Pay for Housing in the Last Year: No     Number of Times Moved in the Last Year: 0     Homeless in the Last Year: No         CURRENT MEDICATIONS  No current facility-administered medications on file prior to encounter.     Current Outpatient Medications on File Prior to Encounter   Medication Sig Dispense Refill    sucralfate (CARAFATE) 1 GM/10ML Suspension Take 10 mL by mouth 4 times a day.  473 mL 3    omeprazole (PRILOSEC) 20 MG delayed-release capsule Take 2 Capsules by mouth 2 times a day. 180 Capsule 3    oxyCODONE-acetaminophen (PERCOCET) 5-325 MG Tab Take 1 Tablet by mouth every 8 hours as needed for Severe Pain for up to 21 days. 7 Tablet 0    gabapentin (NEURONTIN) 400 MG Cap TAKE 3 CAPSULES BY MOUTH THREE TIMES DAILY.  FOR 30 DAY SUPPLY. DX: M79.7 270 Capsule 0    naproxen (NAPROSYN) 500 MG Tab Take one tablet orally twice daily 60 Tablet 0    tizanidine (ZANAFLEX) 4 MG Tab Take 1 tablet orally 3 times daily 90 Tablet 0    meloxicam (MOBIC) 15 MG tablet Take 1 Tablet by mouth every day. 30 Tablet 0    fluconazole (DIFLUCAN) 150 MG tablet Take 1 tablet Orally x1 1 Tablet 0    nystatin (MYCOSTATIN) powder Apply 1 application Externally Twice a day for 10 days 30 g 1    cephALEXin (KEFLEX) 500 MG Cap       doxycycline (VIBRAMYCIN) 100 MG Cap TAKE 1 CAPSULE BY MOUTH TWICE A DAY FOR 10 DAYS (Patient not taking: Reported on 11/4/2024)      ketoconazole (NIZORAL) 2 % Cream APPLY TO AFFECTED AREA EVERY DAY AS NEEDED FOR RASH      loperamide (IMODIUM) 2 MG Cap TAKE 1 CAPSULE BY MOUTH FOUR TIMES A DAY AS NEEDED FOR DIARRHEA      LORazepam (ATIVAN) 1 MG Tab  (Patient not taking: Reported on 11/4/2024)      ondansetron (ZOFRAN ODT) 4 MG TABLET DISPERSIBLE DISSOLVE 1 TABLET ON THE TONGUE EVERY 8 HOURS FOR 5 DAYS AS NEEDED FOR NAUSEA/VOMITING      oxyCODONE immediate-release (ROXICODONE) 5 MG Tab TAKE 2 TABLETS BY MOUTH 2 TIMES A DAY FOR 4 DAYS (Patient not taking: Reported on 11/4/2024)      oxyCODONE-acetaminophen (PERCOCET) 5-325 MG Tab TAKE 1 TABLET BY MOUTH EVERY 6 HOURS FOR 5 DAYS (Patient not taking: Reported on 11/4/2024)      phenazopyridine (PYRIDIUM) 200 MG Tab Take 1 tablet 3 times a day by oral route for 5 days. (Patient not taking: Reported on 11/4/2024)      traMADol (ULTRAM) 50 MG Tab TAKE 1 TABLET BY MOUTH EVERY 6 HOURS AS NEEDED FOR MODERATE PAIN FOR UP TO 5 DAYS.      topiramate  (TOPAMAX) 50 MG tablet Take 1 Tablet by mouth 2 times a day. 180 Tablet 4    prochlorperazine (COMPAZINE) 10 MG Tab Take 1 Tablet by mouth every 6 hours as needed for Nausea/Vomiting. 30 Tablet 0    ciprofloxacin/dexamethasone (CIPRODEX) 0.3-0.1 % Suspension Administer 4 Drops into affected ear(s) 2 times a day. 7.5 mL 0    clindamycin 1 % Gel Apply topically once or twice a day to underarms/face as needed for outbreak 60 g 3    tizanidine (ZANAFLEX) 4 MG capsule take 1 Tablet Three Times A Day as needed Qty 90  Dx.M54.16 90 Capsule 0    scopolamine (TRANSDERM SCOP, 1.5 MG,) 1 mg/72hr PATCH 72 HR Place 1 Patch on the skin every 72 hours. 7 Patch 1    ipratropium-albuterol (COMBIVENT RESPIMAT)  MCG/ACT Aero Soln Inhale 2 Puffs 4 times a day as needed (shortness of breath, wheezing). 4 g 1    albuterol (PROVENTIL) 2.5mg/3ml Nebu Soln solution for nebulization Take 3 mL by nebulization every four hours as needed for Shortness of Breath. 75 mL 0    ziprasidone (GEODON) 40 MG Cap Take 1 capsule by mouth at bedtime. 90 Capsule 1    amLODIPine (NORVASC) 5 MG Tab Take 1 tablet by mouth every day. 90 Tablet 1    metoprolol SR (TOPROL XL) 25 MG TABLET SR 24 HR Take 1 Tablet by mouth 2 times a day. 200 Tablet 3    Galcanezumab-gnlm (EMGALITY) 120 MG/ML Solution Auto-injector Inject 1 mL subcutaneously every month. 1 mL 11    lamoTRIgine (LAMICTAL) 25 MG Tab Take 2 Tablets by mouth 2 times a day. 360 Tablet 1    Rimegepant Sulfate (NURTEC) 75 MG TABLET DISPERSIBLE Take 1 tablet by mouth at onset of migraine or aura okay to repeat in 24 hours if needed. 8 Tablet 5    sumatriptan (IMITREX) 20 MG/ACT nasal spray Give 1 dose at onset headache okay to repeat in 2 hours if needed for max of 2 doses in a 24 hour timeframe. 6 Each 5    ivabradine (CORLANOR) 7.5 MG Tab tablet Take 1 Tablet by mouth 2 times a day with meals. 60 Tablet 3    diphenhydrAMINE (BENADRYL) 25 MG Tab Take 25-50 mg by mouth 2 times a day. Scheduled    25 mg  "= AM  50 MG = PM      Melatonin 10 MG Tab Take 10 mg by mouth at bedtime.      spironolactone (ALDACTONE) 25 MG Tab Take 25 mg by mouth every day.      adalimumab (HUMIRA) 80 MG/0.8ML injection Inject 80 mg under the skin every 14 days. (Patient not taking: Reported on 11/4/2024)      gabapentin (NEURONTIN) 400 MG Cap Take 1,200 mg by mouth 3 times a day. 3 capsules=1200mg             ALLERGIES  Allergies   Allergen Reactions    Cefdinir Shortness of Breath and Itching     Tolerated 1/18/17  Tolerates ceftriaxone  Tolerated augmentin 8/2019     Depakote [Divalproex Sodium] Unspecified     Muscle spasms/muscle pain and weakness      Doxycycline Anaphylaxis and Vomiting     Other reaction(s): pustules/blisters  Other reaction(s): stomach pain    Montelukast [Singulair] Unspecified     Cardiac effusion    Vancomycin Itching     Pt becomes flushed in face and chest.   RXN=7/10/16    Wound Dressing Adhesive Rash     By pt report-\"removes skin totally off\"    Amitriptyline Unspecified     Headaches      Amoxicillin Rash      Tolerates augmentin    Aripiprazole [Abilify] Unspecified     Headaches/muscle twitching      Clindamycin Nausea         Other reaction(s): nausea stomach pain    Erythromycin Rash     .  Other reaction(s): nausea stomach pain    Flomax [Tamsulosin Hydrochloride] Swelling    Hydromorphone      Other reaction(s): vomiting    Levaquin Unspecified     Severe muscle cramps in legs  RXN=unknown  Other reaction(s): leg muscle cramps    Metformin Unspecified     Increased lactic acid     Other reaction(s): itching and rash/nausea vomiting    Sulfa Drugs Hives, Itching, Myalgia and Unspecified     Muscle pain and weakness    Other reaction(s): unknown    Tamsulosin Swelling     Swelling of legs    Tape Rash     Tears skin off  coban with Tegaderm tape ok intermittently  RXN=ongoing    Sulfamethoxazole W-Trimethoprim Rash    Ciprofloxacin Rash          Keflex Rash     Pt states she gets a rash with this " "medication  Tolerates ceftriaxone  Can take with Benadryl    Levofloxacin Unspecified     Leg muscle cramps    Metronidazole Rash     \"Vision problems\"  Other reaction(s): vision problems       PHYSICAL EXAM  VITAL SIGNS:BP (!) 171/99   Pulse 89   Temp 36.9 °C (98.4 °F) (Oral)   Resp 18   Ht 1.626 m (5' 4\")   Wt 122 kg (268 lb 15.4 oz)   SpO2 96%   BMI 46.17 kg/m²       VITALS - vital signs documented prior to this note have been reviewed and noted,  see EHR  GENERAL - awake and alert, no acute distress  HEENT - normocephalic, atraumatic, moist mucus membranes external auditory canal is mildly erythematous there is tragal tenderness tympanic membrane is pearly gray without appreciable effusion.  CARDIOVASCULAR - regular rate and rhythm  PULMONARY - unlabored, no respiratory distress. No audible wheezing or  stridor.  NEUROLOGIC - mental status normal, speech fluid, cognition normal she has a cranial nerve VII palsy on the right that does not spare the forehead., cranial nerves II through XII are otherwise intact,  MUSCULOSKELETAL -no obvious deformity or swelling  DERMATOLOGIC - warm and dry, no visible rashes  PSYCHIATRIC - normal affect, normal concentration      DIAGNOSTIC STUDIES / PROCEDURES      Radiologist interpretation:   No orders to display        COURSE & MEDICAL DECISION MAKING    ED COURSE:    ED Observation Status? no    INTERVENTIONS BY ME:  Medications - No data to display        INITIAL ASSESSMENT, COURSE AND PLAN  Care Narrative: Patient presented for evaluation of right-sided facial droop which started today as well as ear pain which has been ongoing for the past week.  On examination the patient does have a cranial nerve VII palsy which does not spare the forehead, there is no other neurologic deficit noted on neuroexam seems consistent with a likely Bell's palsy.  Will start the patient on steroids for this.  In addition was complaining of ear pain.  On examination I do not see any " evidence of mastoiditis, otitis media.  Though does have a erythematous external auditory canal, with tragal tenderness, believe this may be an otitis externa we will start antibiotic drops for this.  Otherwise at this point do believe the patient is appropriate for continued outpatient management return precautions appropriate follow-up were discussed and she was discharged in stable condition.             ADDITIONAL PROBLEM LIST    DISPOSITION AND DISCUSSIONS      Escalation of care considered, and ultimately not performed:diagnostic imaging      Decision tools and prescription drugs considered including, but not limited to:  Steroids antibiotics .    FINAL DIAGNOSIS  1. Acute otitis externa of right ear, unspecified type    2. Bell's palsy             Electronically signed by: Rogelio Estrada DO ,7:03 PM 12/23/24

## 2024-12-24 NOTE — ED TRIAGE NOTES
"Chief Complaint   Patient presents with    Ear Pain     Patient reports R ear pain x 1 week. Patient reports history of recent sinus infection that was treated on 12/5.     Headache     Patient reports pain to the R side of her head with some numbness x 1 week.     Sent from Urgent Care     Patient sent from  for concern of possible Bell's Palsy with patient reported R side facial numbness and facial droop. No facial droop noted in triage.        33 yo female to triage for above complaint.     Pt is alert and oriented, speaking in full sentences, follows commands and responds appropriately to questions.     Patient placed back in lobby and educated on triage process. Asked to inform RN of any changes.    BP (!) 171/99   Pulse 89   Temp 36.9 °C (98.4 °F) (Oral)   Resp 18   Ht 1.626 m (5' 4\")   Wt 122 kg (268 lb 15.4 oz)   SpO2 96%   BMI 46.17 kg/m²     "

## 2024-12-24 NOTE — ED NOTES
"Kristin Balderrama has been discharged from the Emergency Room.    Pt in possession of belongings.    Discharge instructions, which include signs and symptoms to monitor patient for, as well as detailed information regarding otitis externa provided.  Patient verbalizes understanding of follow up care and medication management. All questions and concerns addressed at this time.     Patient provided with education on when to return to the ER and verbally understands with no concerns. Patient advised on setting up MyChart and information provided about patient survey.  Patient leaves ER in no apparent distress. This RN provided education regarding returning to the ER for any new concerns or changes in patient's condition.      /72   Pulse 76   Temp 36.4 °C (97.6 °F)   Resp 16   Ht 1.626 m (5' 4\")   Wt 122 kg (268 lb 15.4 oz)   SpO2 96%   BMI 46.17 kg/m²    "

## 2024-12-31 ENCOUNTER — OFFICE VISIT (OUTPATIENT)
Dept: URGENT CARE | Facility: CLINIC | Age: 34
End: 2024-12-31
Payer: MEDICARE

## 2024-12-31 VITALS
RESPIRATION RATE: 18 BRPM | TEMPERATURE: 97 F | DIASTOLIC BLOOD PRESSURE: 80 MMHG | SYSTOLIC BLOOD PRESSURE: 128 MMHG | WEIGHT: 267 LBS | HEIGHT: 64 IN | HEART RATE: 87 BPM | BODY MASS INDEX: 45.58 KG/M2 | OXYGEN SATURATION: 97 %

## 2024-12-31 DIAGNOSIS — H66.91 CHRONIC EAR INFECTION, RIGHT: ICD-10-CM

## 2024-12-31 DIAGNOSIS — H92.01 EAR PAIN, RIGHT: ICD-10-CM

## 2024-12-31 DIAGNOSIS — H60.331 CHRONIC SWIMMER'S EAR OF RIGHT SIDE: ICD-10-CM

## 2024-12-31 PROCEDURE — RXMED WILLOW AMBULATORY MEDICATION CHARGE

## 2024-12-31 RX ORDER — CIPROFLOXACIN AND DEXAMETHASONE 3; 1 MG/ML; MG/ML
5 SUSPENSION/ DROPS AURICULAR (OTIC) 2 TIMES DAILY
Qty: 3.5 ML | Refills: 0 | Status: CANCELLED | OUTPATIENT
Start: 2024-12-31 | End: 2025-01-07

## 2024-12-31 RX ORDER — CIPROFLOXACIN AND DEXAMETHASONE 3; 1 MG/ML; MG/ML
4 SUSPENSION/ DROPS AURICULAR (OTIC) 2 TIMES DAILY
Qty: 7.5 ML | Refills: 0 | Status: SHIPPED | OUTPATIENT
Start: 2024-12-31 | End: 2025-01-07

## 2024-12-31 ASSESSMENT — ENCOUNTER SYMPTOMS
FALLS: 0
NAUSEA: 0
CHILLS: 0
BLURRED VISION: 0
VOMITING: 0
SHORTNESS OF BREATH: 0
COUGH: 0
MYALGIAS: 0
HEADACHES: 0
RHINORRHEA: 0
NECK PAIN: 0
SINUS PAIN: 0
ABDOMINAL PAIN: 0
WEAKNESS: 0
STRIDOR: 0
FEVER: 0
DIARRHEA: 0
LOSS OF CONSCIOUSNESS: 0
PALPITATIONS: 0
FOCAL WEAKNESS: 0
SORE THROAT: 0
DIZZINESS: 0

## 2024-12-31 ASSESSMENT — FIBROSIS 4 INDEX: FIB4 SCORE: 0.63

## 2025-01-01 ENCOUNTER — PHARMACY VISIT (OUTPATIENT)
Dept: PHARMACY | Facility: MEDICAL CENTER | Age: 35
End: 2025-01-01
Payer: COMMERCIAL

## 2025-01-01 NOTE — PROGRESS NOTES
Subjective:     Kristin Balderrama is a 34 y.o. female who presents for Otalgia (R ear, s/s have worsened since last visit. )      Otalgia   There is pain in the right ear. This is a chronic problem. The current episode started more than 1 year ago. The problem has been waxing and waning. There has been no fever. The pain is at a severity of 8/10. The pain is moderate. Associated symptoms include hearing loss. Pertinent negatives include no abdominal pain, coughing, diarrhea, ear discharge, headaches, neck pain, rash, rhinorrhea, sore throat or vomiting. She has tried ear drops for the symptoms. The treatment provided significant relief. Her past medical history is significant for a chronic ear infection.       Review of Systems   Constitutional:  Negative for chills, fever and malaise/fatigue.   HENT:  Positive for ear pain and hearing loss. Negative for congestion, ear discharge, rhinorrhea, sinus pain and sore throat.    Eyes:  Negative for blurred vision.   Respiratory:  Negative for cough, shortness of breath and stridor.    Cardiovascular:  Negative for chest pain, palpitations and leg swelling.   Gastrointestinal:  Negative for abdominal pain, diarrhea, nausea and vomiting.   Musculoskeletal:  Negative for falls, myalgias and neck pain.   Skin:  Negative for itching and rash.   Neurological:  Negative for dizziness, focal weakness, loss of consciousness, weakness and headaches.        CURRENT MEDICATIONS:  adalimumab  albuterol Nebu  amLODIPine Tabs  cephALEXin Caps  ciprofloxacin/dexamethasone Susp  clindamycin Gel  Combivent Respimat Aers  diphenhydrAMINE Tabs  doxycycline Caps  Emgality Soaj  fluconazole  gabapentin Caps  ivabradine Tabs  ketoconazole Crea  lamoTRIgine Tabs  loperamide Caps  LORazepam Tabs  Melatonin Tabs  meloxicam  metoprolol SR Tb24  naproxen Tabs  neomycin-polymyxin-HC Susp  Nurtec Tbdp  nystatin  omeprazole  ondansetron Tbdp  oxyCODONE immediate-release Tabs  oxyCODONE-acetaminophen  "Tabs  phenazopyridine Tabs  predniSONE Tabs  prochlorperazine Tabs  scopolamine Pt72  spironolactone Tabs  sucralfate Susp  sumatriptan  tizanidine  tizanidine Tabs  topiramate  traMADol Tabs  ziprasidone Caps    Allergies:     Allergies   Allergen Reactions    Cefdinir Shortness of Breath and Itching     Tolerated 1/18/17  Tolerates ceftriaxone  Tolerated augmentin 8/2019     Depakote [Divalproex Sodium] Unspecified     Muscle spasms/muscle pain and weakness      Doxycycline Anaphylaxis and Vomiting     Other reaction(s): pustules/blisters  Other reaction(s): stomach pain    Montelukast [Singulair] Unspecified     Cardiac effusion    Vancomycin Itching     Pt becomes flushed in face and chest.   RXN=7/10/16    Wound Dressing Adhesive Rash     By pt report-\"removes skin totally off\"    Amitriptyline Unspecified     Headaches      Amoxicillin Rash      Tolerates augmentin    Aripiprazole [Abilify] Unspecified     Headaches/muscle twitching      Clindamycin Nausea         Other reaction(s): nausea stomach pain    Erythromycin Rash     .  Other reaction(s): nausea stomach pain    Flomax [Tamsulosin Hydrochloride] Swelling    Hydromorphone      Other reaction(s): vomiting    Levaquin Unspecified     Severe muscle cramps in legs  RXN=unknown  Other reaction(s): leg muscle cramps    Metformin Unspecified     Increased lactic acid     Other reaction(s): itching and rash/nausea vomiting    Sulfa Drugs Hives, Itching, Myalgia and Unspecified     Muscle pain and weakness    Other reaction(s): unknown    Tamsulosin Swelling     Swelling of legs    Tape Rash     Tears skin off  coban with Tegaderm tape ok intermittently  RXN=ongoing    Sulfamethoxazole W-Trimethoprim Rash    Ciprofloxacin Rash          Keflex Rash     Pt states she gets a rash with this medication  Tolerates ceftriaxone  Can take with Benadryl    Levofloxacin Unspecified     Leg muscle cramps    Metronidazole Rash     \"Vision problems\"  Other reaction(s): " vision problems       Current Problems: Kristin Balderrama does not have any pertinent problems on file.  Past Surgical Hx:    Past Surgical History:   Procedure Laterality Date    TX CYSTOSCOPY,INSERT URETERAL STENT Right 2/12/2024    Procedure: CYSTOSCOPY, WITH RIGHT URETEROSCOPY, WITH LITHOTRIPSY, WITH INSERTION OF RIGHT URETERAL STENT;  Surgeon: Josafat Roberson M.D.;  Location: [facility];  Service: Urology    TX CYSTO/URETERO/PYELOSCOPY, DX Right 2/12/2024    Procedure: URETEROSCOPY;  Surgeon: Josafat Roberson M.D.;  Location: [facility];  Service: Urology    LASERTRIPSY Right 2/12/2024    Procedure: LITHOTRIPSY, USING LASER;  Surgeon: Josafat Roberson M.D.;  Location: [facility];  Service: Urology    INGUINAL HERNIA LAPAROSCOPIC Right 07/21/2023    Procedure: LAPAROSCOPIC RIGHT INGUINAL HERNIA REPAIR WITH MESH;  Surgeon: Joe Noyola M.D.;  Location: [facility];  Service: General    HERNIA REPAIR Right 07/21/2023    PB PERCUT FIX PBOX/NECK FEMUR FX Left 01/28/2023    Procedure: FIXATION, HIP, USING CANNULATED SCREW;  Surgeon: Noman Abdul M.D.;  Location: [facility];  Service: Orthopedics    TX LAP,DIAGNOSTIC ABDOMEN  02/14/2022    Procedure: LAPAROSCOPY;  Surgeon: Saemus Pisano M.D.;  Location: SURGERY SAME DAY [facility];  Service: Gynecology    OVARIAN CYSTECTOMY Right 02/14/2022    Procedure: EXCISION, CYST, OVARY;  Surgeon: Seamus Pisano M.D.;  Location: SURGERY SAME DAY [facility];  Service: Gynecology    BOWEL STIMULATOR INSERTION  03/10/2021    Procedure: INSERTION, ELECTRODE LEADS AND PULSE GENERATOR, NEUROSTIMULATOR, SACRAL - REMOVAL OF INTERSTIM WITH REPLACEMENT OF SACRAL NEUROMODULATION DEVICE;  Surgeon: Joe Noyola M.D.;  Location: [facility];  Service: General    MUSCLE BIOPSY Right 01/26/2017    Procedure: MUSCLE BIOPSY - THIGH;  Surgeon: Isidro Vigil M.D.;  Location: [facility];  Service:     GASTROSCOPY WITH  "BALLOON DILATATION N/A 01/04/2017    Procedure: GASTROSCOPY WITH DILATATION;  Surgeon: Torres Vargas M.D.;  Location: SURGERY [facility];  Service:     BOWEL STIMULATOR INSERTION  07/13/2016    Procedure: BOWEL STIMULATOR INSERTION FOR PERMANENT INTERSTIM SACRAL IMPLANT;  Surgeon: Joe Noyola M.D.;  Location: SURGERY [facility];  Service:     RECOVERY  01/27/2016    Procedure: CATH LAB EP STUDY WITH SINUS NODE MODIFICATION LA;  Surgeon: Recoveryonly Surgery;  Location: SURGERY PRE-POST PROC UNIT [facility];  Service:     OTHER CARDIAC SURGERY  01/2016    cardiac ablation    NEURO DEST FACET L/S W/IG SNGL  04/21/2015    Performed by Reza Tabor at SURGERY [facility]    LUMBAR FUSION ANTERIOR  08/21/2012    Performed by SUSIE SAWANT at SURGERY [facility] ORS    ALYSSA BY LAPAROSCOPY  08/29/2010    Performed by SHAYY JOHNS at SURGERY [facility] ORS    LAMINOTOMY      OTHER ABDOMINAL SURGERY      TONSILLECTOMY      tonsillectomy      Past Social Hx:  reports that she has never smoked. She has never used smokeless tobacco. She reports that she does not drink alcohol and does not use drugs.   Past Family Hx:  Kristin Balderrama family history includes Genitourinary () Problems in her sister; Heart Disease in her maternal grandmother and mother; Hypertension in her maternal grandmother, maternal uncle, and mother; No Known Problems in her sister; Other in her mother and sister; Sleep Apnea in her mother.     (Allergies, Medications, & Tobacco/Substance Use were reconciled by the Medical Assistant and reviewed by myself. The family history is prepopulated)       Objective:     /80   Pulse 87   Temp 36.1 °C (97 °F)   Resp 18   Ht 1.626 m (5' 4\")   Wt 121 kg (267 lb)   SpO2 97%   BMI 45.83 kg/m²     Physical Exam  Vitals reviewed.   Constitutional:       General: She is not in acute distress.     Appearance: She is not ill-appearing, toxic-appearing or diaphoretic.   HENT:      " Head: Normocephalic and atraumatic.      Right Ear: Swelling and tenderness present.      Left Ear: Tympanic membrane, ear canal and external ear normal. There is no impacted cerumen.      Ears:      Comments: Otitis externa  Neurological:      Mental Status: She is alert.         Assessment/Plan:     Kristin was seen today for otalgia.    Diagnoses and all orders for this visit:    Ear pain, right  -     ciprofloxacin/dexamethasone (CIPRODEX) 0.3-0.1 % Suspension; Administer 4 Drops into affected ear(s) 2 times a day for 7 days.    Chronic swimmer's ear of right side  -     Referral to ENT  -     ciprofloxacin/dexamethasone (CIPRODEX) 0.3-0.1 % Suspension; Administer 4 Drops into affected ear(s) 2 times a day for 7 days.    Chronic ear infection, right  -     Referral to ENT  -     ciprofloxacin/dexamethasone (CIPRODEX) 0.3-0.1 % Suspension; Administer 4 Drops into affected ear(s) 2 times a day for 7 days.    Based on history of presenting illness, review of systems and physical exam findings, most likely etiology of ear pain is chronic otitis externa.  Based on history of presenting illness, infection is responsive to Ciprodex however discontinued couple days ago and symptoms have returned.  Patient is afebrile and hemodynamically stable.  There are no signs consistent with acute otitis media requiring oral antibiotics at this time.  Discussed risks benefits indications for Ciprodex at this time including side effect profiles.  Discussed referring to ENT for evaluation of recurrent ear infections.  Discussed strict return and ED precautions and all questions were answered.    Differential diagnosis, natural history, supportive care, and indications for immediate follow-up discussed.    Advised the patient to follow-up with the primary care physician for recheck, reevaluation, and consideration of further management.    Please note that this dictation was created using voice recognition software. I have made  reasonable attempt to correct obvious errors, but I expect that there are errors of grammar and possibly content that I did not discover before finalizing the note.    This note was electronically signed by Jerri Vaca MD PhD

## 2025-01-02 ENCOUNTER — HOSPITAL ENCOUNTER (EMERGENCY)
Facility: MEDICAL CENTER | Age: 35
End: 2025-01-02
Attending: EMERGENCY MEDICINE
Payer: MEDICARE

## 2025-01-02 ENCOUNTER — APPOINTMENT (OUTPATIENT)
Dept: RADIOLOGY | Facility: MEDICAL CENTER | Age: 35
End: 2025-01-02
Attending: EMERGENCY MEDICINE
Payer: MEDICARE

## 2025-01-02 VITALS
BODY MASS INDEX: 45.81 KG/M2 | DIASTOLIC BLOOD PRESSURE: 68 MMHG | OXYGEN SATURATION: 95 % | TEMPERATURE: 97.4 F | HEART RATE: 70 BPM | RESPIRATION RATE: 19 BRPM | WEIGHT: 268.3 LBS | SYSTOLIC BLOOD PRESSURE: 135 MMHG | HEIGHT: 64 IN

## 2025-01-02 DIAGNOSIS — R00.2 PALPITATIONS: ICD-10-CM

## 2025-01-02 LAB
ALBUMIN SERPL BCP-MCNC: 4.2 G/DL (ref 3.2–4.9)
ALBUMIN/GLOB SERPL: 2.1 G/DL
ALP SERPL-CCNC: 71 U/L (ref 30–99)
ALT SERPL-CCNC: 37 U/L (ref 2–50)
ANION GAP SERPL CALC-SCNC: 13 MMOL/L (ref 7–16)
AST SERPL-CCNC: 22 U/L (ref 12–45)
BASOPHILS # BLD AUTO: 0.8 % (ref 0–1.8)
BASOPHILS # BLD: 0.06 K/UL (ref 0–0.12)
BILIRUB SERPL-MCNC: 0.3 MG/DL (ref 0.1–1.5)
BUN SERPL-MCNC: 17 MG/DL (ref 8–22)
CALCIUM ALBUM COR SERPL-MCNC: 9 MG/DL (ref 8.5–10.5)
CALCIUM SERPL-MCNC: 9.2 MG/DL (ref 8.5–10.5)
CHLORIDE SERPL-SCNC: 108 MMOL/L (ref 96–112)
CO2 SERPL-SCNC: 18 MMOL/L (ref 20–33)
CREAT SERPL-MCNC: 0.87 MG/DL (ref 0.5–1.4)
EKG IMPRESSION: NORMAL
EOSINOPHIL # BLD AUTO: 0.69 K/UL (ref 0–0.51)
EOSINOPHIL NFR BLD: 8.6 % (ref 0–6.9)
ERYTHROCYTE [DISTWIDTH] IN BLOOD BY AUTOMATED COUNT: 43.5 FL (ref 35.9–50)
GFR SERPLBLD CREATININE-BSD FMLA CKD-EPI: 89 ML/MIN/1.73 M 2
GLOBULIN SER CALC-MCNC: 2 G/DL (ref 1.9–3.5)
GLUCOSE SERPL-MCNC: 156 MG/DL (ref 65–99)
HCT VFR BLD AUTO: 42.2 % (ref 37–47)
HGB BLD-MCNC: 14.7 G/DL (ref 12–16)
IMM GRANULOCYTES # BLD AUTO: 0.07 K/UL (ref 0–0.11)
IMM GRANULOCYTES NFR BLD AUTO: 0.9 % (ref 0–0.9)
LYMPHOCYTES # BLD AUTO: 2.09 K/UL (ref 1–4.8)
LYMPHOCYTES NFR BLD: 26.2 % (ref 22–41)
MCH RBC QN AUTO: 32.5 PG (ref 27–33)
MCHC RBC AUTO-ENTMCNC: 34.8 G/DL (ref 32.2–35.5)
MCV RBC AUTO: 93.2 FL (ref 81.4–97.8)
MONOCYTES # BLD AUTO: 0.65 K/UL (ref 0–0.85)
MONOCYTES NFR BLD AUTO: 8.1 % (ref 0–13.4)
NEUTROPHILS # BLD AUTO: 4.42 K/UL (ref 1.82–7.42)
NEUTROPHILS NFR BLD: 55.4 % (ref 44–72)
NRBC # BLD AUTO: 0 K/UL
NRBC BLD-RTO: 0 /100 WBC (ref 0–0.2)
NT-PROBNP SERPL IA-MCNC: 48 PG/ML (ref 0–125)
PLATELET # BLD AUTO: 200 K/UL (ref 164–446)
PMV BLD AUTO: 11.5 FL (ref 9–12.9)
POTASSIUM SERPL-SCNC: 3.9 MMOL/L (ref 3.6–5.5)
PROT SERPL-MCNC: 6.2 G/DL (ref 6–8.2)
RBC # BLD AUTO: 4.53 M/UL (ref 4.2–5.4)
SODIUM SERPL-SCNC: 139 MMOL/L (ref 135–145)
TROPONIN T SERPL-MCNC: <6 NG/L (ref 6–19)
WBC # BLD AUTO: 8 K/UL (ref 4.8–10.8)

## 2025-01-02 PROCEDURE — 80053 COMPREHEN METABOLIC PANEL: CPT

## 2025-01-02 PROCEDURE — 85025 COMPLETE CBC W/AUTO DIFF WBC: CPT

## 2025-01-02 PROCEDURE — 36415 COLL VENOUS BLD VENIPUNCTURE: CPT

## 2025-01-02 PROCEDURE — 71045 X-RAY EXAM CHEST 1 VIEW: CPT

## 2025-01-02 PROCEDURE — 83880 ASSAY OF NATRIURETIC PEPTIDE: CPT

## 2025-01-02 PROCEDURE — 93005 ELECTROCARDIOGRAM TRACING: CPT | Mod: TC | Performed by: EMERGENCY MEDICINE

## 2025-01-02 PROCEDURE — 99284 EMERGENCY DEPT VISIT MOD MDM: CPT

## 2025-01-02 PROCEDURE — 93005 ELECTROCARDIOGRAM TRACING: CPT | Mod: TC

## 2025-01-02 PROCEDURE — 84484 ASSAY OF TROPONIN QUANT: CPT

## 2025-01-02 ASSESSMENT — FIBROSIS 4 INDEX: FIB4 SCORE: 0.63

## 2025-01-02 ASSESSMENT — HEART SCORE
AGE: <45
TROPONIN: LESS THAN OR EQUAL TO NORMAL LIMIT
RISK FACTORS: 1-2 RISK FACTORS
ECG: NORMAL
HISTORY: SLIGHTLY SUSPICIOUS
HEART SCORE: 1

## 2025-01-02 NOTE — ED TRIAGE NOTES
Chief Complaint   Patient presents with    Chest Pain     Left side chest pain x 3 days.     Shortness of Breath     X 3 days     Irregular Heart Beat     Patient reports feeling like she is having palpitations. Hx of afib and ablation.

## 2025-01-03 NOTE — ED NOTES
AVS discussed with patient. Return precautions and f/u instructions discussed. [facility]

## 2025-01-03 NOTE — DISCHARGE INSTRUCTIONS
Your tests here were normal.  There are no signs of infection, strain on your heart, and you have not had abnormal heart rhythms on your EKG or cardiac monitoring.  As we discussed, there are many possible reasons for variations in heart rate, but there is no evidence of a dangerous cause.  It is safe to let you go home and continue your home medications.  We recommend scheduling a follow-up with your cardiology group if you have not seen them in a while.  Return here for severe symptoms, should any occur in the meantime.

## 2025-01-03 NOTE — ED PROVIDER NOTES
"ER Provider Note    Scribed for Emery Piña M.d. by Rocio Simmons. 1/2/2025  4:05 PM    Primary Care Provider: Fly Goodson M.D.    CHIEF COMPLAINT  Chief Complaint   Patient presents with    Chest Pain     Left side chest pain x 3 days.     Shortness of Breath     X 3 days     Irregular Heart Beat     Patient reports feeling like she is having palpitations. Hx of afib and ablation.      EXTERNAL RECORDS REVIEWED  Other Patient is followed by Cardiology for her history of atrial fibrillation with ablation.    HPI/ROS  LIMITATION TO HISTORY   Select: : None    OUTSIDE HISTORIAN(S):  None    Kristin Balderrama is a 34 y.o. female with a history of atrial fibrillation who presents to the ED complaining of palpitations onset three days ago. The patient describes her symptoms as a \"fluttering\" or \"butterfly\" feeling in her chest. She endorses additional shortness of breath and left sided chest pain but denies fever, chills, nausea, vomiting, or diarrhea. She notes she recently was seen in the ED for an ear infection and Bell's palsy which were treated with steroids and ear drops; her last dose of steroids was two days ago. The patient is followed by [facility] for her heart history.    PAST MEDICAL HISTORY  Past Medical History:   Diagnosis Date    Abdominal pain     Anginal syndrome     Random chest pain especially with tachycardia    Apnea, sleep     Arthritis     ASTHMA     when around smoke    Back pain     Borderline personality disorder (HCC)     Chickenpox     Chronic UTI 09/18/2010    Daytime sleepiness     Dental disorder 03/08/2021    Infected tooth    Depression     Diabetes (HCC)     Diarrhea     Incontinece    Disorder of thyroid     Hashimoto's    Fatigue     Frequent headaches     Heart burn     History of falling     Inappropriate sinus tachycardia (HCC) 2016    Statusp post ablation by Dr. Kumar.    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    Urinary bladder disorder     Suprapubic cath. 2/8/22: Not anymore.     Urinary incontinence     Weakness     Wears glasses        SURGICAL HISTORY  Past Surgical History:   Procedure Laterality Date    DC CYSTOSCOPY,INSERT URETERAL STENT Right 2/12/2024    Procedure: CYSTOSCOPY, WITH RIGHT URETEROSCOPY, WITH LITHOTRIPSY, WITH INSERTION OF RIGHT URETERAL STENT;  Surgeon: Josafat Roberson M.D.;  Location: SURGERY [facility];  Service: Urology    DC CYSTO/URETERO/PYELOSCOPY, DX Right 2/12/2024    Procedure: URETEROSCOPY;  Surgeon: Josafat Roberson M.D.;  Location: SURGERY [facility];  Service: Urology    LASERTRIPSY Right 2/12/2024    Procedure: LITHOTRIPSY, USING LASER;  Surgeon: Josafat Roberson M.D.;  Location: SURGERY [facility];  Service: Urology    INGUINAL HERNIA LAPAROSCOPIC Right 07/21/2023    Procedure: LAPAROSCOPIC RIGHT INGUINAL HERNIA REPAIR WITH MESH;  Surgeon: Joe Noyola M.D.;  Location: SURGERY [facility];  Service: General    HERNIA REPAIR Right 07/21/2023    PB PERCUT FIX PBOX/NECK FEMUR FX Left 01/28/2023    Procedure: FIXATION, HIP, USING CANNULATED SCREW;  Surgeon: Noman Abdul M.D.;  Location: SURGERY [facility];  Service: Orthopedics    DC LAP,DIAGNOSTIC ABDOMEN  02/14/2022    Procedure: LAPAROSCOPY;  Surgeon: Seamus Pisano M.D.;  Location: SURGERY SAME DAY [facility];  Service: Gynecology    OVARIAN CYSTECTOMY Right 02/14/2022    " Procedure: EXCISION, CYST, OVARY;  Surgeon: Seamus Pisano M.D.;  Location: SURGERY SAME DAY [facility];  Service: Gynecology    BOWEL STIMULATOR INSERTION  03/10/2021    Procedure: INSERTION, ELECTRODE LEADS AND PULSE GENERATOR, NEUROSTIMULATOR, SACRAL - REMOVAL OF INTERSTIM WITH REPLACEMENT OF SACRAL NEUROMODULATION DEVICE;  Surgeon: Joe Noyola M.D.;  Location: SURGERY [facility];  Service: General    MUSCLE BIOPSY Right 01/26/2017    Procedure: MUSCLE BIOPSY - THIGH;  Surgeon: Isidro Vigil M.D.;  Location: SURGERY [facility];  Service:     GASTROSCOPY WITH BALLOON DILATATION N/A 01/04/2017    Procedure: GASTROSCOPY WITH DILATATION;  Surgeon: Torres Vargas M.D.;  Location: SURGERY [facility];  Service:     BOWEL STIMULATOR INSERTION  07/13/2016    Procedure: BOWEL STIMULATOR INSERTION FOR PERMANENT INTERSTIM SACRAL IMPLANT;  Surgeon: Joe Noyola M.D.;  Location: SURGERY [facility];  Service:     RECOVERY  01/27/2016    Procedure: CATH LAB EP STUDY WITH SINUS NODE MODIFICATION LA;  Surgeon: [facility];  Location: SURGERY PRE-POST PROC UNIT [facility];  Service:     OTHER CARDIAC SURGERY  01/2016    cardiac ablation    NEURO DEST FACET L/S W/IG SNGL  04/21/2015    Performed by Reza Tabor at SURGERY [facility]    LUMBAR FUSION ANTERIOR  08/21/2012    Performed by SUSIE SAWANT at SURGERY [facility]    ALYSSA BY LAPAROSCOPY  08/29/2010    Performed by SHAYY JOHNS at SURGERY [facility]    LAMINOTOMY      OTHER ABDOMINAL SURGERY      TONSILLECTOMY      tonsillectomy       FAMILY HISTORY  Family History   Problem Relation Age of Onset    Hypertension Mother     Sleep Apnea Mother     Heart Disease Mother     Other Mother         hypothryod    No Known Problems Sister     Other Sister         Narcolepsy;fibromyalsia;bone;nerve    Genitourinary () Problems Sister         endometriosis    Hypertension Maternal Uncle     Heart Disease Maternal Grandmother      Hypertension Maternal Grandmother     Cancer Neg Hx        SOCIAL HISTORY   reports that she has never smoked. She has never used smokeless tobacco. She reports that she does not drink alcohol and does not use drugs.    CURRENT MEDICATIONS  Previous Medications    ADALIMUMAB (HUMIRA) 80 MG/0.8ML INJECTION    Inject 80 mg under the skin every 14 days.    ALBUTEROL (PROVENTIL) 2.5MG/3ML NEBU SOLN SOLUTION FOR NEBULIZATION    Take 3 mL by nebulization every four hours as needed for Shortness of Breath.    AMLODIPINE (NORVASC) 5 MG TAB    Take 1 tablet by mouth every day.    CEPHALEXIN (KEFLEX) 500 MG CAP        CIPROFLOXACIN/DEXAMETHASONE (CIPRODEX) 0.3-0.1 % SUSPENSION    Administer 4 Drops into affected ear(s) 2 times a day for 7 days.    CLINDAMYCIN 1 % GEL    Apply topically once or twice a day to underarms/face as needed for outbreak    DIPHENHYDRAMINE (BENADRYL) 25 MG TAB    Take 25-50 mg by mouth 2 times a day. Scheduled    25 mg = AM  50 MG = PM    DOXYCYCLINE (VIBRAMYCIN) 100 MG CAP    TAKE 1 CAPSULE BY MOUTH TWICE A DAY FOR 10 DAYS    FLUCONAZOLE (DIFLUCAN) 150 MG TABLET    Take 1 tablet Orally x1    GABAPENTIN (NEURONTIN) 400 MG CAP    Take 1,200 mg by mouth 3 times a day. 3 capsules=1200mg    GABAPENTIN (NEURONTIN) 400 MG CAP    TAKE 3 CAPSULES BY MOUTH THREE TIMES DAILY.  FOR 30 DAY SUPPLY. DX: M79.7    GALCANEZUMAB-GNLM (EMGALITY) 120 MG/ML SOLUTION AUTO-INJECTOR    Inject 1 mL subcutaneously every month.    IPRATROPIUM-ALBUTEROL (COMBIVENT RESPIMAT)  MCG/ACT AERO SOLN    Inhale 2 Puffs 4 times a day as needed (shortness of breath, wheezing).    IVABRADINE (CORLANOR) 7.5 MG TAB TABLET    Take 1 Tablet by mouth 2 times a day with meals.    KETOCONAZOLE (NIZORAL) 2 % CREAM    APPLY TO AFFECTED AREA EVERY DAY AS NEEDED FOR RASH    LAMOTRIGINE (LAMICTAL) 25 MG TAB    Take 2 Tablets by mouth 2 times a day.    LOPERAMIDE (IMODIUM) 2 MG CAP    TAKE 1 CAPSULE BY MOUTH FOUR TIMES A DAY AS NEEDED FOR  DIARRHEA    LORAZEPAM (ATIVAN) 1 MG TAB        MELATONIN 10 MG TAB    Take 10 mg by mouth at bedtime.    MELOXICAM (MOBIC) 15 MG TABLET    Take 1 Tablet by mouth every day.    METOPROLOL SR (TOPROL XL) 25 MG TABLET SR 24 HR    Take 1 Tablet by mouth 2 times a day.    NAPROXEN (NAPROSYN) 500 MG TAB    Take one tablet orally twice daily    NYSTATIN (MYCOSTATIN) POWDER    Apply 1 application Externally Twice a day for 10 days    OMEPRAZOLE (PRILOSEC) 20 MG DELAYED-RELEASE CAPSULE    Take 2 Capsules by mouth 2 times a day.    ONDANSETRON (ZOFRAN ODT) 4 MG TABLET DISPERSIBLE    DISSOLVE 1 TABLET ON THE TONGUE EVERY 8 HOURS FOR 5 DAYS AS NEEDED FOR NAUSEA/VOMITING    OXYCODONE IMMEDIATE-RELEASE (ROXICODONE) 5 MG TAB    TAKE 2 TABLETS BY MOUTH 2 TIMES A DAY FOR 4 DAYS    OXYCODONE-ACETAMINOPHEN (PERCOCET) 5-325 MG TAB    TAKE 1 TABLET BY MOUTH EVERY 6 HOURS FOR 5 DAYS    OXYCODONE-ACETAMINOPHEN (PERCOCET) 5-325 MG TAB    Take 1 Tablet by mouth every 8 hours as needed for Severe Pain for up to 21 days.    PHENAZOPYRIDINE (PYRIDIUM) 200 MG TAB    Take 1 tablet 3 times a day by oral route for 5 days.    PREDNISONE (DELTASONE) 50 MG TAB    Take 1 Tablet by mouth every day.    PROCHLORPERAZINE (COMPAZINE) 10 MG TAB    Take 1 Tablet by mouth every 6 hours as needed for Nausea/Vomiting.    RIMEGEPANT SULFATE (NURTEC) 75 MG TABLET DISPERSIBLE    Take 1 tablet by mouth at onset of migraine or aura okay to repeat in 24 hours if needed.    SCOPOLAMINE (TRANSDERM SCOP, 1.5 MG,) 1 MG/72HR PATCH 72 HR    Place 1 Patch on the skin every 72 hours.    SPIRONOLACTONE (ALDACTONE) 25 MG TAB    Take 25 mg by mouth every day.    SUCRALFATE (CARAFATE) 1 GM/10ML SUSPENSION    Take 10 mL by mouth 4 times a day.    SUMATRIPTAN (IMITREX) 20 MG/ACT NASAL SPRAY    Give 1 dose at onset headache okay to repeat in 2 hours if needed for max of 2 doses in a 24 hour timeframe.    TIZANIDINE (ZANAFLEX) 4 MG CAPSULE    take 1 Tablet Three Times A Day as  "needed Qty 90  Dx.M54.16    TIZANIDINE (ZANAFLEX) 4 MG TAB    Take 1 tablet orally 3 times daily    TOPIRAMATE (TOPAMAX) 50 MG TABLET    Take 1 Tablet by mouth 2 times a day.    TRAMADOL (ULTRAM) 50 MG TAB    TAKE 1 TABLET BY MOUTH EVERY 6 HOURS AS NEEDED FOR MODERATE PAIN FOR UP TO 5 DAYS.    ZIPRASIDONE (GEODON) 40 MG CAP    Take 1 capsule by mouth at bedtime.       ALLERGIES  Cefdinir, Depakote [divalproex sodium], Doxycycline, Montelukast [singulair], Vancomycin, Wound dressing adhesive, Amitriptyline, Amoxicillin, Aripiprazole [abilify], Clindamycin, Erythromycin, Flomax [tamsulosin hydrochloride], Hydromorphone, Levaquin, Metformin, Sulfa drugs, Tamsulosin, Tape, Sulfamethoxazole w-trimethoprim, Ciprofloxacin, Keflex, Levofloxacin, and Metronidazole    PHYSICAL EXAM  VITAL SIGNS: BP (!) 160/107   Pulse 88   Temp 36.3 °C (97.4 °F) (Temporal)   Resp 20   Ht 1.626 m (5' 4\")   Wt 122 kg (268 lb 4.8 oz)   SpO2 99%   BMI 46.05 kg/m²   Pulse ox interpretation: I interpret this pulse ox as normal.  Constitutional: Alert, Obviously anxious.  HENT: No signs of trauma, Bilateral external ears normal, Nose normal.   Eyes: Conjunctiva normal, Non-icteric.   Neck: Normal range of motion, Supple, No stridor.   Lymphatic: No lymphadenopathy noted.   Cardiovascular: Regular rate and rhythm, no murmurs. No arrythmia or PVC's on EKG or telemetry so far.  Thorax & Lungs: Normal breath sounds, No respiratory distress, No wheezing  Abdomen: Bowel sounds normal, Soft, No tenderness, No masses, No pulsatile masses. No peritoneal signs.  Skin: Warm, Dry, No erythema, No rash.   Back: No midline bony tenderness.   Extremities: Intact distal pulses, No edema, No cyanosis.  Musculoskeletal: Good range of motion in all major joints. No or major deformities noted.   Neurologic: Alert , Normal motor function, Normal sensory function, No focal deficits noted.   Psychiatric: Affect normal, Judgment normal, Obviously anxious. "     DIAGNOSTIC STUDIES    Labs:   Labs Reviewed   CBC WITH DIFFERENTIAL - Abnormal; Notable for the following components:       Result Value    Eosinophils 8.60 (*)     Eos (Absolute) 0.69 (*)     All other components within normal limits   COMP METABOLIC PANEL - Abnormal; Notable for the following components:    Co2 18 (*)     Glucose 156 (*)     All other components within normal limits    Narrative:     SPECIMEN IS A RECOLLECT   PROBRAIN NATRIURETIC PEPTIDE, NT    Narrative:     SPECIMEN IS A RECOLLECT   TROPONIN    Narrative:     SPECIMEN IS A RECOLLECT   ESTIMATED GFR    Narrative:     SPECIMEN IS A RECOLLECT     All labs reviewed by me.    EKG:   I have independently interpreted this EKG  Results for orders placed or performed during the hospital encounter of 25   EKG   Result Value Ref Range    Report       [facility].    Test Date:  2025  Pt Name:    HARLEY DE LA CRUZ              Department: ER  MRN:        [id number]                      Room:  Gender:     Female                       Technician: [id number]  :        1990                   Requested By:ER TRIAGE PROTOCOL  Order #:    [id number]                    Reading MD: EVIN DENISE MD    Measurements  Intervals                                Axis  Rate:       82                           P:          30  CT:         126                          QRS:        29  QRSD:       92                           T:          26  QT:         384  QTc:        449    Interpretive Statements  Sinus rhythm  Borderline T abnormalities, anterior leads  Baseline wander in lead(s) III,aVF  Compared to ECG 10/04/2024 14:30:30  No significant changes  Electronically Signed On 2025 16:05:35 PST by EVIN DENISE MD       *Note: Due to a large number of results and/or encounters for the requested time period, some results have not been displayed. A complete set of results can be found in Results Review.        Radiology:   The attending emergency physician has independently interpreted the diagnostic imaging associated with this visit and am waiting the final reading from the radiologist.     Preliminary interpretation is a follows: clear cxr    Radiologist interpretation:   DX-CHEST-PORTABLE (1 VIEW)   Final Result      No radiographic evidence of acute cardiopulmonary disease.          COURSE & MEDICAL DECISION MAKING     INITIAL ASSESSMENT, COURSE AND PLAN  Care Narrative:     CHEST PAIN:   HEART Score for Major Cardiac Events  HEART Score     History: Slightly suspicious  ECG: Normal  Age: <45  Risk Factors: 1-2 risk factors  Troponin: Less than or equal to normal limit    Heart Score: 1    Total Score   0-3 Points = Low Score, risk of MACE 0.9-1.7%.  4-6 Points = Moderate Score, risk of MACE 12-16.6%  7-10 Points = High Score, risk of MACE 50-65%    4:05 PM - Patient presents to the ED with chest pain and shortness of breath. Per triage protocol, EKG, DX-Chest, and CBC w/ diff were ordered. Patient evaluated at bedside and discussed plan of care. Differential diagnoses include but not limited to: Palpitations, paroxysmal atrial fibrillation, PVC's, sinus arrhythmia, anxiety.    5:42 PM tests are unremarkable.  No telemetry events, no abnormal findings.  With subjective palpitations, but no acute findings, this patient can be safely discharged to follow-up with her established cardiology group, primary care, or return to the emergency department in the meantime for severe or worsening symptoms.      DISPOSITION AND DISCUSSIONS  I have discussed management of the patient with the following physicians and ARIES's:  None    Discussion of management with other Q or appropriate source(s): None     Escalation of care considered, and ultimately not performed: acute inpatient care management, however at this time, the patient is most appropriate for outpatient management.  Fortunately, no evidence of an emergent medical  condition.    Barriers to care at this time, including but not limited to:  None .     Decision tools and prescription drugs considered including, but not limited to: Medication modification considered, but not indicated based on today's encounter .    The patient will return for new or worsening symptoms and is stable at the time of discharge.    The patient is referred to a primary physician for blood pressure management, diabetic screening, and for all other preventative health concerns.    DISPOSITION:  Patient will be discharged home in stable condition.    FOLLOW UP:  Fly Goodson M.D.  [address] [place] [address] [id number] [phone]      As needed      FINAL DIAGNOSIS  1. Palpitations        Rocio RODRIGUEZ (Scribe), am scribing for, and in the presence of, Emery Piña M.D..    Electronically signed by: Rocio Simmons (Carolibsadie), 1/2/2025    Emery RODRIGUEZ M.D. personally performed the services described in this documentation, as scribed by Rocio Simmons in my presence, and it is both accurate and complete.

## 2025-01-03 NOTE — ED NOTES
Pt reports HR is fluctuating by a few beats per second while watching on pulse oximetry and reports feeling butterflies in chest. States HR jumps between 70-80bpm, sometimes between 60-80bpm. No arrhythmias or ectopy visualized on cardiac monitor thus far

## 2025-01-07 ENCOUNTER — TELEPHONE (OUTPATIENT)
Dept: PHARMACY | Facility: MEDICAL CENTER | Age: 35
End: 2025-01-07
Payer: MEDICARE

## 2025-01-07 PROCEDURE — RXMED WILLOW AMBULATORY MEDICATION CHARGE: Performed by: NURSE PRACTITIONER

## 2025-01-07 NOTE — TELEPHONE ENCOUNTER
Lola Good Morning    My name is Reena I'm an Rx coordinator,   I came across patient Emgality 120mg and it looks like this will require a Prior Auth through her secondary ([place] [employer]) this does have to be done by phone the Number is [phone]    Please let me know once this gets approved to I could set up delivery.   Thank you!     Reena RENNER  Rx Coordinator   [phone]

## 2025-01-08 ENCOUNTER — TELEPHONE (OUTPATIENT)
Dept: NEUROLOGY | Facility: MEDICAL CENTER | Age: 35
End: 2025-01-08
Payer: MEDICARE

## 2025-01-08 ENCOUNTER — PHARMACY VISIT (OUTPATIENT)
Dept: PHARMACY | Facility: MEDICAL CENTER | Age: 35
End: 2025-01-08
Payer: COMMERCIAL

## 2025-01-09 PROCEDURE — RXMED WILLOW AMBULATORY MEDICATION CHARGE: Performed by: NURSE PRACTITIONER

## 2025-01-11 NOTE — PROGRESS NOTES
"  Chief Complaint   Patient presents with    Palpitations     FV DX:palpitations    Supraventricular Tachycardia (SVT)     FV DX:palpitations       Subjective     Kristin Balderrama is a 34 y.o. female who presents today for ED follow up. She is a patient of Dr. Leon.    Past medical history: sinus tachycardia, ablation 2016 for sinus node modification for IST (Inappropriate Sinus Tachycardia), IST ablation 2016, POTS on chronic Corlanor and beta-blocker therapy, hypermobility syndrome, TONYA, IIH (idiopathic intracranial hypertension), NPH normal pressure hydrocephalus, optic neuropathy, chronic pain syndrome, bladder incontinence, S/P sacral stimulator, cervical neuralgia with leg weakness, nephrolithiasis S/P kidney stone extraction stent placement 2/14/2024, hip fracture 1/27/2023 H/O psychiatric issues.    Today's visit: The patient states that her chest discomfort has remained the same - since the last two ED visits. It remains constant while at rest, but will becomes more noticeable with exertion or when she experiences palpitations. In previus visits with Dr. Leon it was noted that her chest pain was noncardiac in nature. She states that this feeling is \"different\" that before. Her heart rate on her watch varies from the 80s to high 90s. She has a BP monitor at home, takes it sometimes (115/60-70). She states that she is experiencing more fatigue than normal. 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 Has tried ZioPatch several times in the past, but due to skin conditions, she was unable to tolerate the patch (blistering). She is sleeping through the night. She denies nausea, vomiting, pre-syncope or syncope. Takes her medications as prescribed.     1/12/2025: Patient presented to the ED for chest pain at rest and shortness of breath (x2 days). Patient states that her watch told her that she was in " "atrial fibrillation. EKG done in the ED showed sinus rhythm without acute ischemic changes or dysrhythmia. No injury/trauma/fever/cough/recent illness. She has had nausea but no vomiting. She denies diarrhea or dysuria. She denies pregnancy - she is celibate. She denies personal history of blood clots or in the family. She states that she was told at [facility] that she has had a heart attack. She also reports that heart attack runs in the family.     1/2/2025: Patient presented to the ED for chest pain, shortness of breath (x3 days) and irregular heart beat. She described her symptoms as a \"fluttering\" or \"butterfly\" feeling in her chest. She endorses additional shortness of breath and left sided chest pain. She denied fever, chills, nausea, vomiting, or diarrhea. She notes she recently was seen in the ED for an ear infection and Bell's palsy which were treated with steroids and ear drops; her last dose of steroids was two days ago.      9/19/2024: Seen in clinic with Dr. Leon. During that visit, it was documented that she was clinically stable from a cardiac standpoint regarding IST, POTS. She was instructed to continue metoprolol, Corlanor, salt tablets. She was hemodynamically stable then as well.     3/4/2024. Seen in clinic with Dr. Leon. Since last appointment the patient had a significant right axillary infection with Proteus requiring several antibiotics finally treated but has some residual left arm pain.  Also injured her right hip requiring use of wheelchair.  Being evaluated by LATIA Huston MD and has an upcoming MRI pending.  Has had some palpitations with increase in metoprolol currently heart rate not well-controlled     Seen in clinic with Dr. Leon. Since 8/3/2023 appointment the patient the patient has done well from a strictly cardiac standpoint.  However she has had [facility] ER visits or hospitalized several times this year including 1/16/2024 for cervical " neuralgia ultimately managed with chiropractic manipulation (Jonathan Wise DC, personal friend of patient), 2/14/2024 kidney stone extraction, stent placement, 2/18/2024 for pyelonephritis, 3/14/2024 for intractable nausea, vomiting due to gastroparesis.  Gastroparesis being managed by Reinier Preston MD, surgeon [facility].  Had a trial of Botox which was successful and has a follow-up appointment to consider surgical intervention.  Has follow-up with GI consultants for possible colonic bleeding.  For EDS utilizing a Body Braid developed by Laron Patel MD, Citizen of [place] position with home the patient's older daughter who has severe EDS is employed.  No cardiac issues has had no cardiac symptoms including chest pain, palpitations, shortness of breath.     Seen in clinic with Dr. Leon. Since 11/10/2022 appointment the patient she was seen by Reinier Leonardo MD.  She has had chronic problems with an inguinal and ventral hernia.  She recently underwent successful laparoscopic inguinal hernia repair.  Postop she states that with the narcotic therapy her POTS acted up and she had a syncopal episode but is recovering with anticipated endoscopic ventral hernia repair in the future.  She has had chronic chest pain symptoms with ER visits.  She contacted the office and started on 7/3/2023 we tried empiric therapy switching metoprolol to diltiazem however the patient does not feel that its helping and would like to go back to metoprolol.  She also feels switching to diltiazem as allowed for additional headaches.      Additionally she states that genetic testing has returned with a profile suggesting hypermobility syndrome but no specific genetic defect to confirm EDS (Erler's Danlos syndrome)    Past Medical History:   Diagnosis Date    Abdominal pain     Anginal syndrome     Random chest pain especially with tachycardia    Apnea, sleep     Arthritis     ASTHMA     when around smoke    Back pain     Borderline personality  "disorder (HCC)     Chickenpox     Chronic UTI 09/18/2010    Daytime sleepiness     Dental disorder 03/08/2021    Infected tooth    Depression     Diabetes (HCC)     Diarrhea     Incontinece    Disorder of thyroid     Hashimoto's    Fatigue     Frequent headaches     Heart burn     History of falling     Inappropriate sinus tachycardia (HCC) 2016    Statusp post ablation by Dr. Kumar.    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    Urinary bladder disorder     Suprapubic cath. 2/8/22: Not anymore.     Urinary incontinence     Weakness     Wears glasses      Past Surgical History:   Procedure Laterality Date    CO CYSTOSCOPY,INSERT URETERAL STENT Right 2/12/2024    Procedure: CYSTOSCOPY, WITH RIGHT URETEROSCOPY, WITH LITHOTRIPSY, WITH INSERTION OF RIGHT URETERAL STENT;  Surgeon: Josafat Roberson M.D.;  Location: [facility];  Service: Urology    CO CYSTO/URETERO/PYELOSCOPY, DX Right 2/12/2024    Procedure: URETEROSCOPY;  Surgeon: Josafat Roberson M.D.;  Location: [facility];  Service: Urology    LASERTRIPSY Right 2/12/2024    Procedure: LITHOTRIPSY, USING LASER;  Surgeon: Josafat Roberson M.D.;  Location: [facility];  Service: Urology    INGUINAL HERNIA LAPAROSCOPIC Right 07/21/2023    Procedure: LAPAROSCOPIC RIGHT INGUINAL HERNIA REPAIR WITH MESH;  Surgeon: Joe Noyola M.D.;  Location: [facility];  " Service: General    HERNIA REPAIR Right 07/21/2023    PB PERCUT FIX PBOX/NECK FEMUR FX Left 01/28/2023    Procedure: FIXATION, HIP, USING CANNULATED SCREW;  Surgeon: Noman Abdul M.D.;  Location: SURGERY [facility];  Service: Orthopedics    IL LAP,DIAGNOSTIC ABDOMEN  02/14/2022    Procedure: LAPAROSCOPY;  Surgeon: Seamus Pisano M.D.;  Location: SURGERY SAME DAY [facility];  Service: Gynecology    OVARIAN CYSTECTOMY Right 02/14/2022    Procedure: EXCISION, CYST, OVARY;  Surgeon: Seamus Pisano M.D.;  Location: SURGERY SAME DAY [facility];  Service: Gynecology    BOWEL STIMULATOR INSERTION  03/10/2021    Procedure: INSERTION, ELECTRODE LEADS AND PULSE GENERATOR, NEUROSTIMULATOR, SACRAL - REMOVAL OF INTERSTIM WITH REPLACEMENT OF SACRAL NEUROMODULATION DEVICE;  Surgeon: Joe Noyola M.D.;  Location: [facility];  Service: General    MUSCLE BIOPSY Right 01/26/2017    Procedure: MUSCLE BIOPSY - THIGH;  Surgeon: Isidro Vigil M.D.;  Location: [facility];  Service:     GASTROSCOPY WITH BALLOON DILATATION N/A 01/04/2017    Procedure: GASTROSCOPY WITH DILATATION;  Surgeon: Torres Vargas M.D.;  Location: [facility];  Service:     BOWEL STIMULATOR INSERTION  07/13/2016    Procedure: BOWEL STIMULATOR INSERTION FOR PERMANENT INTERSTIM SACRAL IMPLANT;  Surgeon: Joe Noyola M.D.;  Location: [facility];  Service:     RECOVERY  01/27/2016    Procedure: CATH LAB EP STUDY WITH SINUS NODE MODIFICATION ABHINAV;  Surgeon: Recoveryonly Surgery;  Location: SURGERY PRE-POST PROC UNIT [facility];  Service:     OTHER CARDIAC SURGERY  01/2016    cardiac ablation    NEURO DEST FACET L/S W/IG SNGL  04/21/2015    Performed by Reza Tabor at [facility] ORS    LUMBAR FUSION ANTERIOR  08/21/2012    Performed by SUSIE SAWANT at [facility] ORS    ALYSSA BY LAPAROSCOPY  08/29/2010    Performed by SHAYY JOHNS at [facility] ORS    LAMINOTOMY      OTHER ABDOMINAL  SURGERY      TONSILLECTOMY      tonsillectomy     Family History   Problem Relation Age of Onset    Hypertension Mother     Sleep Apnea Mother     Heart Disease Mother     Other Mother         hypothryod    No Known Problems Sister     Other Sister         Narcolepsy;fibromyalsia;bone;nerve    Genitourinary () Problems Sister         endometriosis    Hypertension Maternal Uncle     Heart Disease Maternal Grandmother     Hypertension Maternal Grandmother     Cancer Neg Hx      Social History     Socioeconomic History    Marital status: Single     Spouse name: Not on file    Number of children: Not on file    Years of education: Not on file    Highest education level: Not on file   Occupational History    Not on file   Tobacco Use    Smoking status: Never    Smokeless tobacco: Never   Vaping Use    Vaping status: Never Used   Substance and Sexual Activity    Alcohol use: No     Alcohol/week: 0.0 oz    Drug use: Never    Sexual activity: Not Currently     Birth control/protection: Implant   Other Topics Concern    Not on file   Social History Narrative    ** Merged History Encounter **          Social Drivers of Health     Financial Resource Strain: Medium Risk (4/30/2021)    Overall Financial Resource Strain (CARDIA)     Difficulty of Paying Living Expenses: Somewhat hard   Food Insecurity: No Food Insecurity (10/3/2024)    Hunger Vital Sign     Worried About Running Out of Food in the Last Year: Never true     Ran Out of Food in the Last Year: Never true   Transportation Needs: No Transportation Needs (10/3/2024)    PRAPARE - Transportation     Lack of Transportation (Medical): No     Lack of Transportation (Non-Medical): No   Physical Activity: Not on file   Stress: Not on file   Social Connections: Not on file   Intimate Partner Violence: Not At Risk (10/3/2024)    Humiliation, Afraid, Rape, and Kick questionnaire     Fear of Current or Ex-Partner: No     Emotionally Abused: No     Physically Abused: No      "Sexually Abused: No   Housing Stability: Low Risk  (10/3/2024)    Housing Stability Vital Sign     Unable to Pay for Housing in the Last Year: No     Number of Times Moved in the Last Year: 0     Homeless in the Last Year: No     Allergies   Allergen Reactions    Cefdinir Shortness of Breath and Itching     Tolerated 1/18/17  Tolerates ceftriaxone  Tolerated augmentin 8/2019     Depakote [Divalproex Sodium] Unspecified     Muscle spasms/muscle pain and weakness      Doxycycline Anaphylaxis and Vomiting     Other reaction(s): pustules/blisters  Other reaction(s): stomach pain    Montelukast [Singulair] Unspecified     Cardiac effusion    Vancomycin Itching     Pt becomes flushed in face and chest.   RXN=7/10/16    Wound Dressing Adhesive Rash     By pt report-\"removes skin totally off\"    Amitriptyline Unspecified     Headaches      Amoxicillin Rash      Tolerates augmentin    Aripiprazole [Abilify] Unspecified     Headaches/muscle twitching      Clindamycin Nausea         Other reaction(s): nausea stomach pain    Erythromycin Rash     .  Other reaction(s): nausea stomach pain    Flomax [Tamsulosin Hydrochloride] Swelling    Hydromorphone      Other reaction(s): vomiting    Levaquin Unspecified     Severe muscle cramps in legs  RXN=unknown  Other reaction(s): leg muscle cramps    Metformin Unspecified     Increased lactic acid     Other reaction(s): itching and rash/nausea vomiting    Sulfa Drugs Hives, Itching, Myalgia and Unspecified     Muscle pain and weakness    Other reaction(s): unknown    Tamsulosin Swelling     Swelling of legs    Tape Rash     Tears skin off  coban with Tegaderm tape ok intermittently  RXN=ongoing    Sulfamethoxazole W-Trimethoprim Rash    Ciprofloxacin Rash          Keflex Rash     Pt states she gets a rash with this medication  Tolerates ceftriaxone  Can take with Benadryl    Levofloxacin Unspecified     Leg muscle cramps    Metronidazole Rash     \"Vision problems\"  Other reaction(s): " vision problems     Outpatient Encounter Medications as of 1/13/2025   Medication Sig Dispense Refill    gabapentin (NEURONTIN) 400 MG Cap TAKE 3 CAPSULES BY MOUTH THREE TIMES DAILY. 270 Capsule 0    naproxen (NAPROSYN) 500 MG Tab Take 1 Tablet by mouth Twice A Day 60 Tablet 0    tizanidine (ZANAFLEX) 4 MG Tab Take 1 Tablet by mouth Three Times A Day as needed 90 Tablet 0    omeprazole (PRILOSEC) 20 MG delayed-release capsule Take 2 Capsules by mouth 2 times a day. 180 Capsule 3    loperamide (IMODIUM) 2 MG Cap TAKE 1 CAPSULE BY MOUTH FOUR TIMES A DAY AS NEEDED FOR DIARRHEA      ondansetron (ZOFRAN ODT) 4 MG TABLET DISPERSIBLE DISSOLVE 1 TABLET ON THE TONGUE EVERY 8 HOURS FOR 5 DAYS AS NEEDED FOR NAUSEA/VOMITING      topiramate (TOPAMAX) 50 MG tablet Take 1 Tablet by mouth 2 times a day. 180 Tablet 4    prochlorperazine (COMPAZINE) 10 MG Tab Take 1 Tablet by mouth every 6 hours as needed for Nausea/Vomiting. 30 Tablet 0    tizanidine (ZANAFLEX) 4 MG capsule take 1 Tablet Three Times A Day as needed Qty 90  Dx.M54.16 90 Capsule 0    scopolamine (TRANSDERM SCOP, 1.5 MG,) 1 mg/72hr PATCH 72 HR Place 1 Patch on the skin every 72 hours. 7 Patch 1    ipratropium-albuterol (COMBIVENT RESPIMAT)  MCG/ACT Aero Soln Inhale 2 Puffs 4 times a day as needed (shortness of breath, wheezing). 4 g 1    albuterol (PROVENTIL) 2.5mg/3ml Nebu Soln solution for nebulization Take 3 mL by nebulization every four hours as needed for Shortness of Breath. 75 mL 0    ziprasidone (GEODON) 40 MG Cap Take 1 capsule by mouth at bedtime. 90 Capsule 1    amLODIPine (NORVASC) 5 MG Tab Take 1 tablet by mouth every day. 90 Tablet 1    metoprolol SR (TOPROL XL) 25 MG TABLET SR 24 HR Take 1 Tablet by mouth 2 times a day. 200 Tablet 3    Galcanezumab-gnlm (EMGALITY) 120 MG/ML Solution Auto-injector Inject 1 mL subcutaneously every month. 1 mL 11    lamoTRIgine (LAMICTAL) 25 MG Tab Take 2 Tablets by mouth 2 times a day. 360 Tablet 1    Rimegepant  Sulfate (NURTEC) 75 MG TABLET DISPERSIBLE Take 1 tablet by mouth at onset of migraine or aura okay to repeat in 24 hours if needed. 8 Tablet 5    sumatriptan (IMITREX) 20 MG/ACT nasal spray Give 1 dose at onset headache okay to repeat in 2 hours if needed for max of 2 doses in a 24 hour timeframe. 6 Each 5    ivabradine (CORLANOR) 7.5 MG Tab tablet Take 1 Tablet by mouth 2 times a day with meals. 60 Tablet 3    diphenhydrAMINE (BENADRYL) 25 MG Tab Take 25-50 mg by mouth 2 times a day. Scheduled    25 mg = AM  50 MG = PM      Melatonin 10 MG Tab Take 10 mg by mouth at bedtime.      spironolactone (ALDACTONE) 25 MG Tab Take 25 mg by mouth every day. (Patient taking differently: Take 50 mg by mouth every day.)      [DISCONTINUED] predniSONE (DELTASONE) 50 MG Tab Take 1 Tablet by mouth every day. (Patient not taking: Reported on 1/13/2025) 7 Tablet 0    [DISCONTINUED] sucralfate (CARAFATE) 1 GM/10ML Suspension Take 10 mL by mouth 4 times a day. (Patient not taking: Reported on 12/31/2024) 473 mL 3    [DISCONTINUED] meloxicam (MOBIC) 15 MG tablet Take 1 Tablet by mouth every day. (Patient not taking: Reported on 1/13/2025) 30 Tablet 0    [DISCONTINUED] fluconazole (DIFLUCAN) 150 MG tablet Take 1 tablet Orally x1 (Patient not taking: Reported on 1/13/2025) 1 Tablet 0    [DISCONTINUED] nystatin (MYCOSTATIN) powder Apply 1 application Externally Twice a day for 10 days (Patient not taking: Reported on 1/13/2025) 30 g 1    [DISCONTINUED] cephALEXin (KEFLEX) 500 MG Cap  (Patient not taking: Reported on 1/13/2025)      [DISCONTINUED] doxycycline (VIBRAMYCIN) 100 MG Cap TAKE 1 CAPSULE BY MOUTH TWICE A DAY FOR 10 DAYS (Patient not taking: Reported on 11/4/2024)      [DISCONTINUED] ketoconazole (NIZORAL) 2 % Cream APPLY TO AFFECTED AREA EVERY DAY AS NEEDED FOR RASH (Patient not taking: Reported on 1/13/2025)      [DISCONTINUED] LORazepam (ATIVAN) 1 MG Tab  (Patient not taking: Reported on 11/4/2024)      [DISCONTINUED] oxyCODONE  "immediate-release (ROXICODONE) 5 MG Tab TAKE 2 TABLETS BY MOUTH 2 TIMES A DAY FOR 4 DAYS (Patient not taking: Reported on 11/4/2024)      [DISCONTINUED] oxyCODONE-acetaminophen (PERCOCET) 5-325 MG Tab TAKE 1 TABLET BY MOUTH EVERY 6 HOURS FOR 5 DAYS (Patient not taking: Reported on 11/4/2024)      [DISCONTINUED] phenazopyridine (PYRIDIUM) 200 MG Tab Take 1 tablet 3 times a day by oral route for 5 days. (Patient not taking: Reported on 12/31/2024)      [DISCONTINUED] traMADol (ULTRAM) 50 MG Tab TAKE 1 TABLET BY MOUTH EVERY 6 HOURS AS NEEDED FOR MODERATE PAIN FOR UP TO 5 DAYS. (Patient not taking: Reported on 1/13/2025)      [DISCONTINUED] clindamycin 1 % Gel Apply topically once or twice a day to underarms/face as needed for outbreak (Patient not taking: Reported on 1/13/2025) 60 g 3    [DISCONTINUED] adalimumab (HUMIRA) 80 MG/0.8ML injection Inject 80 mg under the skin every 14 days. (Patient not taking: Reported on 11/4/2024)      [DISCONTINUED] gabapentin (NEURONTIN) 400 MG Cap Take 1,200 mg by mouth 3 times a day. 3 capsules=1200mg (Patient not taking: Reported on 1/13/2025)       No facility-administered encounter medications on file as of 1/13/2025.     Review of Systems   Constitutional:  Positive for malaise/fatigue. Negative for chills and fever.   Respiratory:  Positive for shortness of breath. Negative for cough.    Cardiovascular:  Positive for chest pain and palpitations. Negative for leg swelling.   Gastrointestinal:  Negative for nausea and vomiting.   Musculoskeletal:  Positive for joint pain.   Skin:  Negative for itching and rash.   Neurological:  Negative for dizziness and headaches.   Psychiatric/Behavioral:  The patient is nervous/anxious.        Objective     /56 (BP Location: Left arm, Patient Position: Sitting, BP Cuff Size: Adult)   Pulse 77   Resp 18   Ht 1.626 m (5' 4\")   Wt 121 kg (267 lb)   SpO2 94%   BMI 45.83 kg/m²     Physical Exam  Constitutional:       Appearance: Normal " appearance.   HENT:      Head: Normocephalic.   Cardiovascular:      Rate and Rhythm: Normal rate and regular rhythm.      Pulses: Normal pulses.      Heart sounds: Normal heart sounds.   Pulmonary:      Effort: Pulmonary effort is normal.      Breath sounds: Normal breath sounds. No wheezing.   Abdominal:      Palpations: Abdomen is soft.   Musculoskeletal:      Right lower leg: No edema.      Left lower leg: No edema.   Skin:     General: Skin is warm and dry.   Neurological:      General: No focal deficit present.      Mental Status: She is alert and oriented to person, place, and time.   Psychiatric:         Mood and Affect: Mood normal.         Behavior: Behavior normal.         PROCEDURES AND IMAGING    SVT Ablation with EP study: 1/272016 (Dr. Kumra)  CONCLUSIONS:  1.  Sinus node modification was performed.  2.  Normal conduction intervals.  3.  Patient easily went into atrial fibrillation with atrial pacing down to 220 and a single extra stimulus at 220.  This seems like she has a propensity towards atrial fibrillation.  4.  No inducible tachycardia despite triple extrastimuli on isoproterenol.    Echocardiogram: 12/27/2025  Normal left ventricular systolic function.  The left ventricular ejection fraction is visually estimated to be 55%.  Normal right ventricular size and systolic function.  Right heart pressures are normal    Cardiac Stress Test: 12/27/2022   1. Negative pharmacological stress test for ischemia or infarction.   2. Normal left ventricular systolic function with a calculated ejection fraction of 72 % with normal wall motion.   3. No chest pain and T wave inversion was noted with the infusion that resolved into recovery.     Assessment & Plan     1. Inappropriate sinus tachycardia (HCC)  REFERRAL TO CARDIOLOGY    NM-CARDIAC STRESS TEST    CANCELED: Cardiac Event Monitor      2. Postural orthostatic tachycardia syndrome        3. Palpitations  EKG    REFERRAL TO CARDIOLOGY    NM-CARDIAC STRESS  TEST    CANCELED: Cardiac Event Monitor      4. SVT (supraventricular tachycardia) (HCC)        5. Shortness of breath  CANCELED: Cardiac Event Monitor      6. Atypical chest pain  NM-CARDIAC STRESS TEST          Medical Decision Making: Today's Assessment/Status/Plan:       Inappropriate sinus tachycardia  POTS  Palpitations  Shortness of breath  Chest discomfort  SVT (history of)  -S/P ablation (2016) for SVT with inappropriate sinus tachycardia  -EKG in office today showed sinus rhythm (rate 67), borderline T abnormalities, anterior leads   -Discussed increasing metoprolol - due to her extreme fatigue and borderline low BP, decided against it  -Continue metoprolol 25mg BID  -Continue corlanor 7.5mg BID  -Discussed ordering Cardiac Event Monitor - decided against due to serious blistering that has occurred in past with both types of patches (including hypoallergenic one)  -Stress test ordered to work up chest discomfort / shortness of breath  -Referral to EP to discuss potential loop recorder  -Continue to implement stress reducing techniques and ensure adequate hydration      Will reach out with stress test results. Appointment scheduled with EP at the end of the month and [facility] Cards provider in March. Was instructed to return to clinic sooner if problems or if symptoms do not resolve, should present to the ED.

## 2025-01-12 ENCOUNTER — APPOINTMENT (OUTPATIENT)
Dept: RADIOLOGY | Facility: MEDICAL CENTER | Age: 35
End: 2025-01-12
Attending: EMERGENCY MEDICINE
Payer: MEDICARE

## 2025-01-12 ENCOUNTER — APPOINTMENT (OUTPATIENT)
Dept: RADIOLOGY | Facility: MEDICAL CENTER | Age: 35
End: 2025-01-12
Payer: MEDICARE

## 2025-01-12 ENCOUNTER — HOSPITAL ENCOUNTER (EMERGENCY)
Facility: MEDICAL CENTER | Age: 35
End: 2025-01-12
Attending: EMERGENCY MEDICINE
Payer: MEDICARE

## 2025-01-12 VITALS
OXYGEN SATURATION: 97 % | WEIGHT: 269.4 LBS | SYSTOLIC BLOOD PRESSURE: 140 MMHG | RESPIRATION RATE: 24 BRPM | BODY MASS INDEX: 46.24 KG/M2 | TEMPERATURE: 97.8 F | HEART RATE: 77 BPM | DIASTOLIC BLOOD PRESSURE: 61 MMHG

## 2025-01-12 DIAGNOSIS — R06.02 SHORTNESS OF BREATH: ICD-10-CM

## 2025-01-12 DIAGNOSIS — R00.2 PALPITATIONS: ICD-10-CM

## 2025-01-12 DIAGNOSIS — R07.9 LEFT-SIDED CHEST PAIN: ICD-10-CM

## 2025-01-12 LAB
ALBUMIN SERPL BCP-MCNC: 4.8 G/DL (ref 3.2–4.9)
ALBUMIN/GLOB SERPL: 2.4 G/DL
ALP SERPL-CCNC: 67 U/L (ref 30–99)
ALT SERPL-CCNC: 38 U/L (ref 2–50)
ANION GAP SERPL CALC-SCNC: 15 MMOL/L (ref 7–16)
AST SERPL-CCNC: 24 U/L (ref 12–45)
BASOPHILS # BLD AUTO: 0.9 % (ref 0–1.8)
BASOPHILS # BLD: 0.06 K/UL (ref 0–0.12)
BILIRUB SERPL-MCNC: 0.4 MG/DL (ref 0.1–1.5)
BUN SERPL-MCNC: 12 MG/DL (ref 8–22)
CALCIUM ALBUM COR SERPL-MCNC: 9.1 MG/DL (ref 8.5–10.5)
CALCIUM SERPL-MCNC: 9.7 MG/DL (ref 8.5–10.5)
CHLORIDE SERPL-SCNC: 106 MMOL/L (ref 96–112)
CO2 SERPL-SCNC: 17 MMOL/L (ref 20–33)
CREAT SERPL-MCNC: 0.78 MG/DL (ref 0.5–1.4)
EKG IMPRESSION: NORMAL
EOSINOPHIL # BLD AUTO: 0.43 K/UL (ref 0–0.51)
EOSINOPHIL NFR BLD: 6.4 % (ref 0–6.9)
ERYTHROCYTE [DISTWIDTH] IN BLOOD BY AUTOMATED COUNT: 41.3 FL (ref 35.9–50)
GFR SERPLBLD CREATININE-BSD FMLA CKD-EPI: 101 ML/MIN/1.73 M 2
GLOBULIN SER CALC-MCNC: 2 G/DL (ref 1.9–3.5)
GLUCOSE SERPL-MCNC: 113 MG/DL (ref 65–99)
HCG SERPL QL: NEGATIVE
HCT VFR BLD AUTO: 41.7 % (ref 37–47)
HGB BLD-MCNC: 14.5 G/DL (ref 12–16)
IMM GRANULOCYTES # BLD AUTO: 0.03 K/UL (ref 0–0.11)
IMM GRANULOCYTES NFR BLD AUTO: 0.4 % (ref 0–0.9)
LYMPHOCYTES # BLD AUTO: 1.75 K/UL (ref 1–4.8)
LYMPHOCYTES NFR BLD: 26.2 % (ref 22–41)
MAGNESIUM SERPL-MCNC: 2 MG/DL (ref 1.5–2.5)
MCH RBC QN AUTO: 32.2 PG (ref 27–33)
MCHC RBC AUTO-ENTMCNC: 34.8 G/DL (ref 32.2–35.5)
MCV RBC AUTO: 92.5 FL (ref 81.4–97.8)
MONOCYTES # BLD AUTO: 0.55 K/UL (ref 0–0.85)
MONOCYTES NFR BLD AUTO: 8.2 % (ref 0–13.4)
NEUTROPHILS # BLD AUTO: 3.87 K/UL (ref 1.82–7.42)
NEUTROPHILS NFR BLD: 57.9 % (ref 44–72)
NRBC # BLD AUTO: 0 K/UL
NRBC BLD-RTO: 0 /100 WBC (ref 0–0.2)
NT-PROBNP SERPL IA-MCNC: 104 PG/ML (ref 0–125)
PLATELET # BLD AUTO: 184 K/UL (ref 164–446)
PMV BLD AUTO: 11.4 FL (ref 9–12.9)
POTASSIUM SERPL-SCNC: 4.3 MMOL/L (ref 3.6–5.5)
PROT SERPL-MCNC: 6.8 G/DL (ref 6–8.2)
RBC # BLD AUTO: 4.51 M/UL (ref 4.2–5.4)
SODIUM SERPL-SCNC: 138 MMOL/L (ref 135–145)
TROPONIN T SERPL-MCNC: 6 NG/L (ref 6–19)
TSH SERPL DL<=0.005 MIU/L-ACNC: 3.14 UIU/ML (ref 0.38–5.33)
WBC # BLD AUTO: 6.7 K/UL (ref 4.8–10.8)

## 2025-01-12 PROCEDURE — 85025 COMPLETE CBC W/AUTO DIFF WBC: CPT

## 2025-01-12 PROCEDURE — 71045 X-RAY EXAM CHEST 1 VIEW: CPT

## 2025-01-12 PROCEDURE — 93005 ELECTROCARDIOGRAM TRACING: CPT | Mod: TC | Performed by: EMERGENCY MEDICINE

## 2025-01-12 PROCEDURE — 84703 CHORIONIC GONADOTROPIN ASSAY: CPT

## 2025-01-12 PROCEDURE — 84443 ASSAY THYROID STIM HORMONE: CPT

## 2025-01-12 PROCEDURE — 99284 EMERGENCY DEPT VISIT MOD MDM: CPT

## 2025-01-12 PROCEDURE — 83735 ASSAY OF MAGNESIUM: CPT

## 2025-01-12 PROCEDURE — 84484 ASSAY OF TROPONIN QUANT: CPT

## 2025-01-12 PROCEDURE — 83880 ASSAY OF NATRIURETIC PEPTIDE: CPT

## 2025-01-12 PROCEDURE — 36415 COLL VENOUS BLD VENIPUNCTURE: CPT

## 2025-01-12 PROCEDURE — 80053 COMPREHEN METABOLIC PANEL: CPT

## 2025-01-12 PROCEDURE — 93005 ELECTROCARDIOGRAM TRACING: CPT | Mod: TC

## 2025-01-12 ASSESSMENT — FIBROSIS 4 INDEX: FIB4 SCORE: 0.63

## 2025-01-13 ENCOUNTER — OFFICE VISIT (OUTPATIENT)
Dept: CARDIOLOGY | Facility: MEDICAL CENTER | Age: 35
End: 2025-01-13
Attending: NURSE PRACTITIONER
Payer: MEDICARE

## 2025-01-13 VITALS
DIASTOLIC BLOOD PRESSURE: 56 MMHG | SYSTOLIC BLOOD PRESSURE: 102 MMHG | HEIGHT: 64 IN | HEART RATE: 77 BPM | RESPIRATION RATE: 18 BRPM | BODY MASS INDEX: 45.58 KG/M2 | WEIGHT: 267 LBS | OXYGEN SATURATION: 94 %

## 2025-01-13 DIAGNOSIS — R00.2 PALPITATIONS: ICD-10-CM

## 2025-01-13 DIAGNOSIS — I47.11 INAPPROPRIATE SINUS TACHYCARDIA (HCC): ICD-10-CM

## 2025-01-13 DIAGNOSIS — K31.84 GASTROPARESIS: ICD-10-CM

## 2025-01-13 DIAGNOSIS — R06.02 SHORTNESS OF BREATH: ICD-10-CM

## 2025-01-13 DIAGNOSIS — G90.A POSTURAL ORTHOSTATIC TACHYCARDIA SYNDROME: ICD-10-CM

## 2025-01-13 DIAGNOSIS — R07.89 ATYPICAL CHEST PAIN: ICD-10-CM

## 2025-01-13 DIAGNOSIS — I47.10 SVT (SUPRAVENTRICULAR TACHYCARDIA) (HCC): ICD-10-CM

## 2025-01-13 PROCEDURE — 99214 OFFICE O/P EST MOD 30 MIN: CPT | Performed by: NURSE PRACTITIONER

## 2025-01-13 PROCEDURE — 99212 OFFICE O/P EST SF 10 MIN: CPT | Performed by: NURSE PRACTITIONER

## 2025-01-13 PROCEDURE — 93005 ELECTROCARDIOGRAM TRACING: CPT | Mod: TC | Performed by: NURSE PRACTITIONER

## 2025-01-13 RX ORDER — METOPROLOL SUCCINATE 25 MG/1
25 TABLET, EXTENDED RELEASE ORAL SEE ADMIN INSTRUCTIONS
Qty: 270 TABLET | Refills: 1 | Status: CANCELLED | OUTPATIENT
Start: 2025-01-13

## 2025-01-13 ASSESSMENT — ENCOUNTER SYMPTOMS
DIZZINESS: 0
COUGH: 0
HEADACHES: 0
NAUSEA: 0
FEVER: 0
VOMITING: 0
NERVOUS/ANXIOUS: 1
PALPITATIONS: 1
SHORTNESS OF BREATH: 1
CHILLS: 0

## 2025-01-13 ASSESSMENT — FIBROSIS 4 INDEX: FIB4 SCORE: 0.74

## 2025-01-13 NOTE — ED PROVIDER NOTES
ED Provider Note    CHIEF COMPLAINT  Chief Complaint   Patient presents with    Chest Pain    Shortness of Breath               HPI    Primary care provider: Fly Goodson M.D.   History obtained from: Patient  History limited by: None     Kristin Balderrama is a 34 y.o. female who presents to the ED complaining of left upper chest pain that started 2 days ago while at rest along with shortness of breath.  Patient states that her watch told her that she was having atrial fibrillation today which is what prompted her to come in.  No injury/trauma/fever/cough/recent illness.  She has had nausea but no vomiting.  She denies diarrhea or dysuria.  She denies possibility of pregnancy because she is celibate.  No rash noted.  She denies personal history of blood clots or in the family.  She states that she was told at [facility] that she has had a heart attack.  She also reports that heart attack runs in the family.    REVIEW OF SYSTEMS  Please see HPI for pertinent positives/negatives.  All other systems reviewed and are negative.     PAST MEDICAL HISTORY  Past Medical History:   Diagnosis Date    Dental disorder 03/08/2021    Infected tooth    Supraventricular tachycardia (HCC) 04/10/2019    Inappropriate sinus tachycardia (HCC) 2016    Statusp post ablation by Dr. Kuamr.    Sinus tachycardia 10/31/2013    Chronic UTI 09/18/2010    Abdominal pain     Anginal syndrome     Random chest pain especially with tachycardia    Apnea, sleep     Arthritis     ASTHMA     when around smoke    Back pain     Borderline personality disorder (HCC)     Chickenpox     Daytime sleepiness     Depression     Diabetes (HCC)     Diarrhea     Incontinece    Disorder of thyroid     Hashimoto's    Fatigue     Frequent headaches     Heart burn     History of falling     Migraine     Mitochondrial disease (HCC)     Multiple personality disorder (HCC)     Obesity      "Pain     Back, 7/10    Painful joint     PCOS (polycystic ovarian syndrome)     Pneumonia 2012 12/2020    POTS (postural orthostatic tachycardia syndrome)     Psychosis (HCC)     Ringing in ears     Scoliosis     Shortness of breath     O2 as needed    Sleep apnea     CPAP \"pulmonary doctor took me off mid year 2016\"    Snoring     Transverse myelitis (HCC)     2/8/22: Per pt: not anymore    Tuberculosis     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    Urinary bladder disorder     Suprapubic cath. 2/8/22: Not anymore.     Urinary incontinence     Weakness     Wears glasses         SURGICAL HISTORY  Past Surgical History:   Procedure Laterality Date    NC CYSTOSCOPY,INSERT URETERAL STENT Right 2/12/2024    Procedure: CYSTOSCOPY, WITH RIGHT URETEROSCOPY, WITH LITHOTRIPSY, WITH INSERTION OF RIGHT URETERAL STENT;  Surgeon: Josafat Roberson M.D.;  Location: [facility];  Service: Urology    NC CYSTO/URETERO/PYELOSCOPY, DX Right 2/12/2024    Procedure: URETEROSCOPY;  Surgeon: Josafat Roberson M.D.;  Location: [facility];  Service: Urology    LASERTRIPSY Right 2/12/2024    Procedure: LITHOTRIPSY, USING LASER;  Surgeon: Josafat Roberson M.D.;  Location: SURGERY [facility];  Service: Urology    INGUINAL HERNIA LAPAROSCOPIC Right 07/21/2023    Procedure: LAPAROSCOPIC RIGHT INGUINAL HERNIA REPAIR WITH MESH;  Surgeon: Joe Noyola M.D.;  Location: [facility];  Service: General    HERNIA REPAIR Right 07/21/2023    PB PERCUT FIX PBOX/NECK FEMUR FX Left 01/28/2023    Procedure: FIXATION, HIP, USING CANNULATED SCREW;  Surgeon: Noman Abdul M.D.;  Location: SURGERY [facility];  Service: Orthopedics    NC LAP,DIAGNOSTIC ABDOMEN  02/14/2022    Procedure: LAPAROSCOPY;  Surgeon: Seamus Pisano M.D.;  Location: SURGERY SAME DAY [facility];  Service: Gynecology    OVARIAN CYSTECTOMY Right 02/14/2022    Procedure: EXCISION, CYST, OVARY;  Surgeon: Seamus Pisano M.D.;  Location: SURGERY SAME DAY " [facility];  Service: Gynecology    BOWEL STIMULATOR INSERTION  03/10/2021    Procedure: INSERTION, ELECTRODE LEADS AND PULSE GENERATOR, NEUROSTIMULATOR, SACRAL - REMOVAL OF INTERSTIM WITH REPLACEMENT OF SACRAL NEUROMODULATION DEVICE;  Surgeon: Joe Noyola M.D.;  Location: SURGERY [facility];  Service: General    MUSCLE BIOPSY Right 01/26/2017    Procedure: MUSCLE BIOPSY - THIGH;  Surgeon: Isidro Vigil M.D.;  Location: SURGERY [facility];  Service:     GASTROSCOPY WITH BALLOON DILATATION N/A 01/04/2017    Procedure: GASTROSCOPY WITH DILATATION;  Surgeon: Torres Vargas M.D.;  Location: SURGERY [facility];  Service:     BOWEL STIMULATOR INSERTION  07/13/2016    Procedure: BOWEL STIMULATOR INSERTION FOR PERMANENT INTERSTIM SACRAL IMPLANT;  Surgeon: Joe Noyola M.D.;  Location: SURGERY [facility];  Service:     RECOVERY  01/27/2016    Procedure: CATH LAB EP STUDY WITH SINUS NODE MODIFICATION LA;  Surgeon: [facility];  Location: SURGERY PRE-POST PROC UNIT [facility];  Service:     OTHER CARDIAC SURGERY  01/2016    cardiac ablation    NEURO DEST FACET L/S W/IG SNGL  04/21/2015    Performed by Reza Tabor at SURGERY [facility]    LUMBAR FUSION ANTERIOR  08/21/2012    Performed by SUSIE SAWANT at SURGERY [facility]    ALYSSA BY LAPAROSCOPY  08/29/2010    Performed by SHAYY JOHNS at SURGERY [facility]    LAMINOTOMY      OTHER ABDOMINAL SURGERY      TONSILLECTOMY      tonsillectomy        SOCIAL HISTORY  Social History     Tobacco Use    Smoking status: Never    Smokeless tobacco: Never   Vaping Use    Vaping status: Never Used   Substance and Sexual Activity    Alcohol use: No     Alcohol/week: 0.0 oz    Drug use: Never    Sexual activity: Not Currently     Birth control/protection: Implant        FAMILY HISTORY  Family History   Problem Relation Age of Onset    Hypertension Mother     Sleep Apnea Mother     Heart Disease Mother     Other Mother         hypothryod    No  "Known Problems Sister     Other Sister         Narcolepsy;fibromyalsia;bone;nerve    Genitourinary () Problems Sister         endometriosis    Hypertension Maternal Uncle     Heart Disease Maternal Grandmother     Hypertension Maternal Grandmother     Cancer Neg Hx         CURRENT MEDICATIONS  Home Medications    **Home medications have not yet been reviewed for this encounter**          ALLERGIES  Allergies   Allergen Reactions    Cefdinir Shortness of Breath and Itching     Tolerated 1/18/17  Tolerates ceftriaxone  Tolerated augmentin 8/2019     Depakote [Divalproex Sodium] Unspecified     Muscle spasms/muscle pain and weakness      Doxycycline Anaphylaxis and Vomiting     Other reaction(s): pustules/blisters  Other reaction(s): stomach pain    Montelukast [Singulair] Unspecified     Cardiac effusion    Vancomycin Itching     Pt becomes flushed in face and chest.   RXN=7/10/16    Wound Dressing Adhesive Rash     By pt report-\"removes skin totally off\"    Amitriptyline Unspecified     Headaches      Amoxicillin Rash      Tolerates augmentin    Aripiprazole [Abilify] Unspecified     Headaches/muscle twitching      Clindamycin Nausea         Other reaction(s): nausea stomach pain    Erythromycin Rash     .  Other reaction(s): nausea stomach pain    Flomax [Tamsulosin Hydrochloride] Swelling    Hydromorphone      Other reaction(s): vomiting    Levaquin Unspecified     Severe muscle cramps in legs  RXN=unknown  Other reaction(s): leg muscle cramps    Metformin Unspecified     Increased lactic acid     Other reaction(s): itching and rash/nausea vomiting    Sulfa Drugs Hives, Itching, Myalgia and Unspecified     Muscle pain and weakness    Other reaction(s): unknown    Tamsulosin Swelling     Swelling of legs    Tape Rash     Tears skin off  coban with Tegaderm tape ok intermittently  RXN=ongoing    Sulfamethoxazole W-Trimethoprim Rash    Ciprofloxacin Rash          Keflex Rash     Pt states she gets a rash with this " "medication  Tolerates ceftriaxone  Can take with Benadryl    Levofloxacin Unspecified     Leg muscle cramps    Metronidazole Rash     \"Vision problems\"  Other reaction(s): vision problems        PHYSICAL EXAM  VITAL SIGNS: BP (!) 140/61   Pulse 77   Temp 36.6 °C (97.8 °F) (Temporal)   Resp (!) 24   Wt 122 kg (269 lb 6.4 oz)   SpO2 97%   BMI 46.24 kg/m²  @JODI[[id number]::@     Pulse ox interpretation: 98% I interpret this pulse ox as normal     Cardiac monitor interpretation: Sinus rhythm with heart rate in the 80s as interpreted by me.  The patient presented with chest pain and cardiac monitor was ordered to monitor for dysrhythmia.    Constitutional: Well developed, well nourished, alert in no apparent distress, nontoxic appearance    HENT: No external signs of trauma, normocephalic  Eyes: PERRL, conjunctiva without erythema, no discharge, no icterus    Neck: Soft and supple, trachea midline, no stridor, no tenderness, no LAD, good ROM    Cardiovascular: Regular rate and rhythm, no murmurs/rubs/gallops, strong distal pulses and good perfusion    Thorax & Lungs: No respiratory distress, CTAB    Abdomen: Soft, nontender, nondistended, no guarding, no rebound, normal BS    Extremities: No cyanosis, no edema, no gross deformity, intact distal pulses with brisk cap refill    Skin: Warm, dry, no pallor/cyanosis, no rash noted      Neuro: A/O times 3, no focal deficits noted    Psychiatric: Cooperative        DIAGNOSTIC STUDIES / PROCEDURES    EKG  12 Lead EKG obtained at 1719 and interpreted by me:   Rate: 80   Rhythm: Sinus rhythm   Ectopy: None  Intervals: Normal   Axis: Normal   QRS: Normal   ST segments: Normal  T Waves: Normal    Clinical Impression: Sinus rhythm without acute ischemic changes or dysrhythmia  Compared to January 2, 2025 without significant change      LABS  All labs reviewed by me.     Results for orders placed or performed during the hospital encounter of 01/12/25   EKG    Collection Time: " 25  5:19 PM   Result Value Ref Range    Report       [facility] Emergency Dept.    Test Date:  2025  Pt Name:    HARLEY DE LA CRUZ              Department: ER  MRN:        [id number]                      Room:  Gender:     Female                       Technician: [id number]  :        [id number]                   Requested By:ER TRIAGE PROTOCOL  Order #:    [id number]                    Reading MD:    Measurements  Intervals                                Axis  Rate:       80                           P:          29  IA:         137                          QRS:        15  QRSD:       94                           T:          9  QT:         394  QTc:        455    Interpretive Statements  Sinus rhythm  Compared to ECG 2025 15:05:12  T-wave abnormality no longer present     CBC with Differential    Collection Time: 25  7:09 PM   Result Value Ref Range    WBC 6.7 4.8 - 10.8 K/uL    RBC 4.51 4.20 - 5.40 M/uL    Hemoglobin 14.5 12.0 - 16.0 g/dL    Hematocrit 41.7 37.0 - 47.0 %    MCV 92.5 81.4 - 97.8 fL    MCH 32.2 27.0 - 33.0 pg    MCHC 34.8 32.2 - 35.5 g/dL    RDW 41.3 35.9 - 50.0 fL    Platelet Count 184 164 - 446 K/uL    MPV 11.4 9.0 - 12.9 fL    Neutrophils-Polys 57.90 44.00 - 72.00 %    Lymphocytes 26.20 22.00 - 41.00 %    Monocytes 8.20 0.00 - 13.40 %    Eosinophils 6.40 0.00 - 6.90 %    Basophils 0.90 0.00 - 1.80 %    Immature Granulocytes 0.40 0.00 - 0.90 %    Nucleated RBC 0.00 0.00 - 0.20 /100 WBC    Neutrophils (Absolute) 3.87 1.82 - 7.42 K/uL    Lymphs (Absolute) 1.75 1.00 - 4.80 K/uL    Monos (Absolute) 0.55 0.00 - 0.85 K/uL    Eos (Absolute) 0.43 0.00 - 0.51 K/uL    Baso (Absolute) 0.06 0.00 - 0.12 K/uL    Immature Granulocytes (abs) 0.03 0.00 - 0.11 K/uL    NRBC (Absolute) 0.00 K/uL   Comp Metabolic Panel    Collection Time: 01/12/25  7:09 PM   Result Value Ref Range    Sodium 138 135 - 145 mmol/L    Potassium 4.3 3.6 - 5.5 mmol/L    Chloride 106 96 - 112 mmol/L    Co2  17 (L) 20 - 33 mmol/L    Anion Gap 15.0 7.0 - 16.0    Glucose 113 (H) 65 - 99 mg/dL    Bun 12 8 - 22 mg/dL    Creatinine 0.78 0.50 - 1.40 mg/dL    Calcium 9.7 8.5 - 10.5 mg/dL    Correct Calcium 9.1 8.5 - 10.5 mg/dL    AST(SGOT) 24 12 - 45 U/L    ALT(SGPT) 38 2 - 50 U/L    Alkaline Phosphatase 67 30 - 99 U/L    Total Bilirubin 0.4 0.1 - 1.5 mg/dL    Albumin 4.8 3.2 - 4.9 g/dL    Total Protein 6.8 6.0 - 8.2 g/dL    Globulin 2.0 1.9 - 3.5 g/dL    A-G Ratio 2.4 g/dL   proBrain Natriuretic Peptide, NT    Collection Time: 01/12/25  7:09 PM   Result Value Ref Range    NT-proBNP 104 0 - 125 pg/mL   HCG Qual Serum    Collection Time: 01/12/25  7:09 PM   Result Value Ref Range    Beta-Hcg Qualitative Serum Negative Negative   MAGNESIUM    Collection Time: 01/12/25  7:09 PM   Result Value Ref Range    Magnesium 2.0 1.5 - 2.5 mg/dL   TSH WITH REFLEX TO FT4    Collection Time: 01/12/25  7:09 PM   Result Value Ref Range    TSH 3.140 0.380 - 5.330 uIU/mL   TROPONIN    Collection Time: 01/12/25  7:09 PM   Result Value Ref Range    Troponin T 6 6 - 19 ng/L   ESTIMATED GFR    Collection Time: 01/12/25  7:09 PM   Result Value Ref Range    GFR (CKD-EPI) 101 >60 mL/min/1.73 m 2     *Note: Due to a large number of results and/or encounters for the requested time period, some results have not been displayed. A complete set of results can be found in Results Review.        RADIOLOGY  I have independently interpreted the diagnostic imaging associated with this visit and am waiting the final reading from the radiologist.   My preliminary interpretation is as follows: Poor inspiration on portable chest x-ray without acute findings compared to previous.    DX-CHEST-PORTABLE (1 VIEW)   Final Result      Hypoinflation without other evidence for acute cardiopulmonary disease.             COURSE & MEDICAL DECISION MAKING  Nursing notes, VS, PMSFHx reviewed in chart.     Review of past medical records shows the patient was last seen in this ED  January 2, 2025 for chest pain, shortness of breath and irregular heartbeat and discharged with diagnosis of palpitations.  Patient was seen at urgent care on December 31, 2024 and discharged with diagnosis of right ear pain, chronic swimmer's ear of right side, chronic right ear infection and was prescribed Ciprodex and given referral to ENT.  Last cardiac echo on December 26, 2022 had the following findings:    Normal left ventricular systolic function.  The left ventricular ejection fraction is visually estimated to be 55%.  Normal right ventricular size and systolic function.  Right heart pressures are normal      Cardiac stress test on December 27, 2022 had the following findings:    1. Negative pharmacological stress test for ischemia or infarction.   2. Normal left ventricular systolic function with a calculated ejection fraction of 72 % with normal wall motion.   3. No chest pain and T wave inversion was noted with the infusion that resolved into recovery.       Differential diagnoses considered include but are not limited to: AMI, dissection, PE, pneumothorax, CHF/pulm edema, pericardial effusion/tamponade, myocarditis, pericarditis, pleural effusion, pleurisy, costochondritis, esophageal spasm, GERD, hiatal hernia, pancreatitis, herpes zoster, muscle strain, neuropathy       ED Observation Status? No; Patient does not meet criteria for ED Observation.       Discussion of management with other \[facility]\"" or appropriate source(s): None     Escalation of care considered, and ultimately not performed: acute inpatient care management, however at this time, the patient is most appropriate for outpatient management.     Decision tools and prescription drugs considered including, but not limited to: Medication modification   .      The patient was ruled out for PE by PERC criteria:    Age < 50  HR < 100  RA sat > 94%  No hx of DVT/PE  No hemoptysis  No recent surgery/trauma (4 weeks)  No estrogen/BCP  No unilateral leg  swelling          Pt risk-stratified as low risk for MACE in the next 6 weeks by HEART Score (0-3): 2    HISTORY  Highly suspicious +2  Moderately suspicious +1  Slightly suspicious 0    EKG  Significant ST depression +2  Nonspecific repolarization disturbance +1  Normal 0    AGE  >= 65 +2  45-65 +1  < 45 0    RISK FACTORS  Hypercholesterolemia, HTN, DM, Cigarette smoking, positive family history, obesity  >= 3 risk factors or history of atherosclerotic disease +2  1-2 risk factors +1  No risk factors known 0    TROPONIN  >= 3× normal limit +2  1-3× normal limit +1  <= normal limit 0      History and physical exam as above.  This is a 34-year-old female patient with medical history including SVT, inappropriate sinus tachycardia status post ablation, sleep apnea, asthma, borderline personality disorder, depression, diabetes, thyroid disorder, migraine, multiple personality disorder, PCOS, POTS who presents to the ED with above complaints.  EKG without acute findings or significant change compared to prior and troponin without elevation.  Unlikely to be ACS given constant chest pain for more than 2 days without troponin elevation.  Unlikely to be PE using PERC criteria.  At this time, I also have low clinical suspicion for other concerning pathology such as dissection, tamponade, myocarditis, heart failure.  Chest x-ray without acute findings.  Laboratory testing is unrevealing.  Patient was monitored in the ED and remained clinically stable.  No evidence for concerning dysrhythmia or hemodynamic instability.  She has elevated blood pressure without evidence for hypertensive emergency and can follow-up on outpatient basis for further management.  I discussed the findings with patient.  She is noted to be in no acute distress and nontoxic in appearance.  At this time, no evidence for emergent pathology or indications for admission.  She was advised on outpatient follow-up and reports that she has appointment tomorrow.   Return to ED precautions given.  Patient verbalized understanding and agreed with plan of care with no further questions or concerns.      The patient is referred to a primary physician for blood pressure management, diabetic screening, and for all other preventative health concerns.       FINAL IMPRESSION  1. Left-sided chest pain Acute   2. Shortness of breath Acute   3. Palpitations Acute          DISPOSITION  Patient will be discharged home in stable condition.       FOLLOW UP  Fly Goodson M.D.  [address]  [place] [id number] [phone]    Call in 1 day      [facility], Emergency Dept  [address] [facility] [id number] [phone]    If symptoms worsen         OUTPATIENT MEDICATIONS  Discharge Medication List as of 1/12/2025  9:11 PM             Electronically signed by: Yair Diallo D.O., 1/12/2025 6:34 PM      Portions of this record were made with voice recognition software.  Despite my review, errors may remain.  Please interpret this chart in the appropriate context.

## 2025-01-13 NOTE — ED NOTES
Patient has been provided written and verbal education on chest pain. Follow up with cardiology discussed. Pt provided home care including frequent rest break, increased fluid intake and healthy diet. She is ambulatory on DC. IV removed.

## 2025-01-14 LAB — EKG IMPRESSION: NORMAL

## 2025-01-14 PROCEDURE — 93010 ELECTROCARDIOGRAM REPORT: CPT | Performed by: INTERNAL MEDICINE

## 2025-01-16 ENCOUNTER — APPOINTMENT (OUTPATIENT)
Dept: NEUROLOGY | Facility: MEDICAL CENTER | Age: 35
End: 2025-01-16
Attending: SPECIALIST
Payer: MEDICARE

## 2025-01-16 NOTE — PROGRESS NOTES
"Peds/Neuro Ophthalmology:   John Franks M.D.    Date & Time note created:    8/29/2023   5:09 PM     Referring MD / APRN:  Torres Brody M.D., No att. providers found    Patient ID:  Name:             Kristin Balderrama   YOB: 1990  Age:                 32 y.o.  female   MRN:               [id number]    Chief Complaint/Reason for Visit:     Other (New pt eval for optic neuritis)      History of Present Illness:    Kristin Balderrama is a 32 y.o. female   New pt eval for optic neuritis. Pt gets treated for migraines. Pt is not currently having any pain or discomfort, but about a week ago the pt states that her left eye started to experience pressure pain, which quickly turned into sharp, stabbing pain which is when she went to the ER and put her on steroids. The left eye was also very light sensitive. Pt states that she saw [facility] for years who helped treat her optic neuritis. Pt says she was doing good after [facility] retried, but then the left eye got \"cranky\".         Review of Systems:  Review of Systems   Eyes:         Optic neuritis    All other systems reviewed and are negative.      Past Medical History:   Past Medical History:   Diagnosis Date    Abdominal pain     Anginal syndrome     random chest pain especially with tachycardia    Apnea, sleep     Arrhythmia     \"sinus tachycardia\", cariologist, Dr. Kumar; ablation 2/2016    Arthritis     osteo    ASTHMA     when around smoke    Back pain     Borderline personality disorder (HCC)     Breath shortness     with tachycardia    Bronchitis 02/08/2022    Last time was 12/21    Cardiac arrhythmia     Chickenpox     Chronic UTI 09/18/2010    Cough     Daytime sleepiness     Dental disorder 03/08/2021    infected tooth    Depression     Diabetes (HCC)     Diarrhea     incontinece    Disorder of thyroid     Hashimoto's    Fall     Fatigue     Frequent headaches     Gasping for breath     Gynecological disorder     PCOS    " Virtual Visit Details    Type of service:  Video Visit   Video Start Time:  1609  Video End Time: 1626    Originating Location (pt. Location): Home  Distant Location (provider location):  Off-site  Platform used for Video Visit: [facility]      TRANSPLANT NEPHROLOGY CLINIC VISIT     Assessment & Plan   # DDKT (SPK): CKD Stage 2 - Stable   - Baseline Creatinine: ~ 1.0-1.2   - Proteinuria: Normal (<0.2 grams)   - DSA Hx: No DSA   - Last cPRA: 36%   - BK Viremia: Yes, minimally elevated (< 1000)   - Kidney Tx Biopsy Hx: No history of acute rejection.    # Pancreas Tx (SPK): Stable glucose levels, but trend up slightly in HbA1c. Patient has gained weight post transplant and discussed insulin resistance and recommendation for weight loss.   Would consider metformin and/or SGLT2 inhibitor.  Defer to PCP to manage.   - Pancreatic Exocrine Drainage: Enteric drained     - Blood glucose: Near euglycemia      On insulin: No   - HbA1c: Trend up      Latest HbA1c: 6.3%   - Pancreatic enzymes: Stable   - DSA Hx: No DSA   - Pancreas Tx Biopsy Hx: No history of acute rejection    # Immunosuppression: Tacrolimus immediate release (goal 5-8) and Mycophenolate mofetil (dose 500 mg every 12 hours)   - Induction with Recent Transplant:  Intermediate Intensity Protocol   - Continue with intensive monitoring of immunosuppression for efficacy and toxicity.   - Historical Changes in Immunosuppression:  Slightly decreased mycophenolate dose due to BK viremia.   - Changes: No    # Infection Prevention:  Last CD4 Level: 604 (Jan/2025)  - PJP: None      - CMV IgG Ab High Risk Discordance (D+/R-): No  CMV Serostatus: Positive  - EBV IgG Ab High Risk Discordance (D+/R-): No  EBV Serostatus: Positive    # Hypertension: Not checked recently;  Goal BP: < 130/80   - Changes: Not at this time; Recommend patient check blood pressure at home on a regular basis, such as once every week or two, and follow up with primary Nephrologist and/or PCP if above  "Headache(784.0)     Heart burn     Heart murmur     History of falling     Indigestion     Migraine     Mitochondrial disease (HCC)     Multiple personality disorder (HCC)     Nausea     Obesity     Other fatigue 06/29/2020    Pain 08/15/2012    back, 7/10    Painful joint     PCOS (polycystic ovarian syndrome)     Pneumonia 2012 12/2020    POTS (postural orthostatic tachycardia syndrome)     Psychosis (HCC)     Ringing in ears     Scoliosis     Shortness of breath     O2 as needed    Sinus tachycardia 10/31/2013    Sleep apnea     CPAP \"pulmonary doctor took me off mid year 2016\"    Snoring     Supraventricular tachycardia (HCC) 4/10/2019    Tonsillitis     Transverse myelitis (HCC)     2/8/22: Per pt: not anymore    Tuberculosis     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    Urinary bladder disorder     Suprapubic cath. 2/8/22: Not anymore.     Urinary incontinence     Weakness     Wears glasses        Past Surgical History:  Past Surgical History:   Procedure Laterality Date    INGUINAL HERNIA LAPAROSCOPIC Right 07/21/2023    Procedure: LAPAROSCOPIC RIGHT INGUINAL HERNIA REPAIR WITH MESH;  Surgeon: Joe Noyola M.D.;  Location: [facility];  Service: General    HERNIA REPAIR Right 07/21/2023    PB PERCUT FIX PBOX/NECK FEMUR FX Left 01/28/2023    Procedure: FIXATION, HIP, USING CANNULATED SCREW;  Surgeon: Noman Abdul M.D.;  Location: [facility];  Service: Orthopedics    AR LAP,DIAGNOSTIC ABDOMEN  02/14/2022    Procedure: LAPAROSCOPY;  Surgeon: Seamus Pisano M.D.;  Location: SURGERY SAME DAY [facility];  Service: Gynecology    OVARIAN CYSTECTOMY Right 02/14/2022    Procedure: EXCISION, CYST, OVARY;  Surgeon: Seamus Pisano M.D.;  Location: SURGERY SAME DAY [facility];  Service: Gynecology    BOWEL STIMULATOR INSERTION  03/10/2021    Procedure: INSERTION, ELECTRODE LEADS AND PULSE GENERATOR, NEUROSTIMULATOR, SACRAL - REMOVAL OF INTERSTIM WITH REPLACEMENT OF SACRAL NEUROMODULATION " goal.    # Mineral Bone Disorder:    - Vitamin D; level: Low        On supplement: Yes  - Calcium; level: Normal        On supplement: No    # Electrolytes:   - Potassium; level: Normal        On supplement: No  - Magnesium; level: Not checked recently        On supplement: Yes  - Bicarbonate; level: Normal        On supplement: No    # BK Viremia: Stable, minimally elevated BK PCR at ~ 34 with last check Jan/2025.  IgG level is normal.  Slightly reduced immunosuppression.   - Will continue to follow BK PCR every 2 months.    # Obesity, Class II (BMI = 39.9): Patient thinks his weight is stable, but hasn't weighed himself lately.  Last weight was during provider visit 8/2024 at 108.9 kg.  Patient has since been started on phentermine.   - Recommend weight loss for overall health by increasing exercise and watching caloric intake.   - Patient may benefit from SGLT2 inhibitors.  However, because of their pancreas transplant, would avoid GLP1 agonists due to increased risk of pancreatitis.     # Other Significant PMH:   - None     # Skin Cancer Risk:    - Discussed sun protection and recommend regular follow up with Dermatology.    # Transplant History:  Etiology of Kidney Failure: Diabetes mellitus type 2  Tx: SPK  Transplant: 6/11/2021 (Kidney / Pancreas)  Significant transplant-related complications: BK Viremia    Transplant Office Phone Number: [phone]    Assessment and plan was discussed with the patient and he voiced his understanding and agreement.    Return visit: Return in about 1 year (around 1/16/2026).    Roman Fraser MD    The longitudinal plan of care for the diagnosis(es)/condition(s) as documented were addressed during this visit. Due to the added complexity in care, I will continue to support Tye in the subsequent management and with ongoing continuity of care.      Chief Complaint   Mr. Cramer is a 41 year old here for kidney transplant and immunosuppression management.     History of  DEVICE;  Surgeon: Joe Noyola M.D.;  Location: SURGERY [facility];  Service: General    MUSCLE BIOPSY Right 01/26/2017    Procedure: MUSCLE BIOPSY - THIGH;  Surgeon: Isidro Vigil M.D.;  Location: SURGERY [facility];  Service:     GASTROSCOPY WITH BALLOON DILATATION N/A 01/04/2017    Procedure: GASTROSCOPY WITH DILATATION;  Surgeon: Torres Vargas M.D.;  Location: SURGERY [facility];  Service:     BOWEL STIMULATOR INSERTION  07/13/2016    Procedure: BOWEL STIMULATOR INSERTION FOR PERMANENT INTERSTIM SACRAL IMPLANT;  Surgeon: Joe Noyola M.D.;  Location: SURGERY [facility];  Service:     RECOVERY  01/27/2016    Procedure: CATH LAB EP STUDY WITH SINUS NODE MODIFICATION LA;  Surgeon: [facility];  Location: SURGERY PRE-POST PROC UNIT [facility];  Service:     OTHER CARDIAC SURGERY  01/2016    cardiac ablation    NEURO DEST FACET L/S W/IG SNGL  04/21/2015    Performed by Reza Tabor at SURGERY [facility]    LUMBAR FUSION ANTERIOR  08/21/2012    Performed by SUSIE SAWANT at SURGERY [facility] ORS    ALYSSA BY LAPAROSCOPY  08/29/2010    Performed by SHAYY JOHNS at SURGERY [facility] ORS    LAMINOTOMY      OTHER ABDOMINAL SURGERY      TONSILLECTOMY      tonsillectomy       Current Outpatient Medications:  Current Outpatient Medications   Medication Sig Dispense Refill    topiramate (TOPAMAX) 25 MG Tab Take 25 Tablets by mouth 2 times a day.      predniSONE (DELTASONE) 50 MG Tab predniSONE      omeprazole (PRILOSEC) 20 MG delayed-release capsule Take 1 Capsule by mouth every day for 21 days. 21 Capsule 0    predniSONE (DELTASONE) 50 MG Tab Take 2 Tablets by mouth every day for 15 days. 30 Tablet 0    fluconazole (DIFLUCAN) 150 MG tablet Take 150 mg by mouth every Wednesday.      topiramate (TOPAMAX) 25 MG Tab Take 25-50 mg by mouth 2 times a day. 1 tablet (25 mg) in the morning 2 tablets (50 mg) at bedtime      metoprolol SR (TOPROL XL) 25 MG TABLET SR 24 HR Take 1 Tablet by mouth  Present Illness    Mr. Cramer reports feeling good overall.  Since last clinic visit:   Hospitalizations: No   New Medical Issues: No  Active/Exercise: Yes, but less so in winter  Chest pain or shortness of breath: No  Lower extremity swelling: No  Weight change: No   Appetite change: Yes; Slightly less since starting phentermine.  Nausea and vomiting: No  Diarrhea: No  Heartburn symptoms: No  Fever, sweats or chills: No  Night sweats: No  Urinary complaints: No    Home BP: Not checked    Problem List   Patient Active Problem List   Diagnosis    HTN, kidney transplant related    Type 2 diabetes mellitus (H)    Vitamin D deficiency    Gallstones, common bile duct    Pancreas replaced by transplant (H)    Kidney replaced by transplant    Immunosuppressed status    Hypomagnesemia    BK viremia    Aftercare following organ transplant    CKD (chronic kidney disease) stage 2, GFR 60-89 ml/min    Class 1 obesity due to excess calories without serious comorbidity with body mass index (BMI) of 32.0 to 32.9 in adult       Allergies   Allergies   Allergen Reactions    Metoprolol      SOB, but was also experiencing significant edema not drug associated       Medications   Current Outpatient Medications   Medication Sig Dispense Refill    aspirin (ASA) 81 MG EC tablet Take 1 tablet (81 mg) by mouth daily 90 tablet 3    carvedilol (COREG) 6.25 MG tablet Take 1 tablet (6.25 mg) by mouth 2 times daily 120 tablet 3    losartan (COZAAR) 25 MG tablet Take 1 tablet (25 mg) by mouth at bedtime 90 tablet 3    magnesium oxide (MAG-OX) 400 MG tablet Take 1 tablet (400 mg) by mouth daily 60 tablet 11    montelukast (SINGULAIR) 10 MG tablet Take 10 mg by mouth At Bedtime      mycophenolate (GENERIC EQUIVALENT) 250 MG capsule Take 2 capsules (500 mg) by mouth 2 times daily. 120 capsule 11    mycophenolate (GENERIC EQUIVALENT) 250 MG capsule Take 2 capsules (500 mg) by mouth 2 times daily. 120 capsule 11    rosuvastatin (CRESTOR) 10 MG tablet  "every day. 90 Tablet 3    docusate sodium (STOOL SOFTENER) 250 MG capsule Take 250 mg by mouth 2 times a day.      albuterol (PROVENTIL) 2.5mg/0.5ml Nebu Soln Take 2.5 mg by nebulization every four hours as needed for Shortness of Breath.      Adalimumab 80 MG/0.8ML Pen-injector Kit Inject 80 mg under the skin every 14 days.      naproxen (NAPROSYN) 500 MG Tab Take 1 Tablet by mouth 2 times a day with meals. 20 Tablet 0    albuterol 108 (90 Base) MCG/ACT Aero Soln inhalation aerosol Inhale 2 Puffs every 6 hours as needed for Shortness of Breath. 8.5 g 0    tizanidine (ZANAFLEX) 4 MG Tab Take 4 mg by mouth 3 times a day.      sodium chloride (SALT) 1 GM Tab Take 1 Tablet by mouth 3 times a day with meals. 90 Tablet 2    ivabradine (CORLANOR) 7.5 MG Tab tablet Take 1 Tablet by mouth 2 times a day with meals. 60 Tablet 2    ziprasidone (GEODON) 40 MG Cap Take 1 capsule by mouth at bedtime. 30 Capsule 2    diphenhydrAMINE (BENADRYL) 50 MG tablet Take 50 mg by mouth at bedtime.      Melatonin 10 MG Tab Take 10 mg by mouth at bedtime.      etonogestrel (NEXPLANON) 68 MG Implant implant Inject 1 Each under the skin see administration instructions. Pt reports she is not sure when she had this medications injected last, pt reports she still has it in her arm  Every 3 years to change       No current facility-administered medications for this visit.       Allergies:  Allergies   Allergen Reactions    Cefdinir Shortness of Breath and Itching     Tolerated 1/18/17  Tolerates ceftriaxone  Tolerated augmentin 8/2019     Depakote [Divalproex Sodium] Unspecified     Muscle spasms/muscle pain and weakness      Doxycycline Anaphylaxis and Vomiting     Other reaction(s): pustules/blisters  Other reaction(s): stomach pain    Montelukast [Singulair] Unspecified     Cardiac effusion    Vancomycin Itching     Pt becomes flushed in face and chest.   RXN=7/10/16    Wound Dressing Adhesive Rash     By pt report-\"removes skin totally off\"    " Take 1 tablet (10 mg) by mouth daily 90 tablet 0    tacrolimus (GENERIC EQUIVALENT) 0.5 MG capsule Take 1 capsule (0.5 mg) by mouth 2 times daily. Total dose 2.5 mg twice daily. 60 capsule 11    tacrolimus (GENERIC EQUIVALENT) 1 MG capsule Take 2 capsules (2 mg) by mouth 2 times daily. Total dose 2.5 mg twice daily. 120 capsule 11    vitamin D3 (CHOLECALCIFEROL) 2000 units (50 mcg) tablet Take 1 tablet (2,000 Units) by mouth daily 90 tablet 0    vitamin D3 (CHOLECALCIFEROL) 50 mcg (2000 units) tablet Take 0.5 tablets (25 mcg) by mouth daily 15 tablet 11     No current facility-administered medications for this visit.     There are no discontinued medications.    Physical Exam   Vital Signs: There were no vitals taken for this visit.    GENERAL APPEARANCE: alert and no distress  HENT: no obvious abnormalities on appearance  RESP: breathing appears unremarkable with normal rate, no audible wheezing or cough and no apparent shortness of breath with conversation  MS: extremities normal - no gross deformities noted  SKIN: no apparent rash and normal skin tone  NEURO: speech is clear with no obvious neurological deficits  PSYCH: mentation appears normal and affect normal    Data         Latest Ref Rng & Units 1/8/2025     7:35 AM 12/11/2024     7:27 AM 11/6/2024     7:15 AM   Renal   Na (external) 136 - 145 mmol/L 142  140     142    K (external) 3.5 - 5.1 mmol/L 4.4  4.3     4.7    Cl 98 - 109 mmol/L 108  108     108    Cl (external) 98 - 109 mmol/L 108  108     108    CO2 (external) 20 - 29 mmol/L 24  24     25    BUN (external) 7 - 26 mg/dL 16  15     15    Cr (external) 0.73 - 1.18 mg/dL 0.93  0.85     1.06    Glucose (external) 70 - 100 mg/dL 123  113     121    Ca (external) 8.4 - 10.4 mg/dL 9.7  9.2     9.5        This result is from an external source.         Latest Ref Rng & Units 2/7/2024     7:36 AM 6/8/2022     8:11 AM 5/11/2022     8:11 AM   Bone Health   Phos (external) 2.3 - 4.7 mg/dL  2.4  2.3       Vit D  "Amitriptyline Unspecified     Headaches      Aripiprazole Unspecified    Aripiprazole [Abilify] Unspecified     Headaches/muscle twitching      Clindamycin Nausea         Other reaction(s): nausea stomach pain    Flomax [Tamsulosin Hydrochloride] Swelling    Levaquin Unspecified     Severe muscle cramps in legs  RXN=unknown  Other reaction(s): leg muscle cramps    Metformin Unspecified     Increased lactic acid     Other reaction(s): itching and rash/nausea vomiting    Tamsulosin Swelling     Swelling of legs    Tape Rash     Tears skin off  coban with Tegaderm tape ok intermittently  RXN=ongoing    Amoxicillin Rash    Depakote [Divalproex Sodium]     Erythromycin Rash     .  Other reaction(s): nausea stomach pain    Hydromorphone      Other reaction(s): vomiting    Montelukast     Ampicillin Rash     Pt reports that she received a rash     Ciprofloxacin Rash          Keflex Rash     Pt states she gets a rash with this medication  Tolerates ceftriaxone  Can take with Benadryl    Levofloxacin Unspecified     Leg muscle cramps    Metronidazole Rash     \"Vision problems\"  Other reaction(s): vision problems    Penicillins Hives     Can take with Benadryl    Sulfa Drugs Itching, Myalgia and Hives     Muscle pain and weakness  Other reaction(s): unknown    Valproic Acid Rash     .       Family History:  Family History   Problem Relation Age of Onset    Hypertension Mother     Sleep Apnea Mother     Heart Disease Mother     Other Mother         hypothryod    Hypertension Maternal Uncle     Heart Disease Maternal Grandmother     Hypertension Maternal Grandmother     No Known Problems Sister     Other Sister         Narcolepsy;fibromyalsia;bone;nerve    Genitourinary () Problems Sister         endometriosis       Social History:  Social History     Socioeconomic History    Marital status: Single     Spouse name: Not on file    Number of children: Not on file    Years of education: Not on file    Highest education level: " Def (external) 30 - 80 ng/mL 26             This result is from an external source.         Latest Ref Rng & Units 1/8/2025     7:35 AM 12/11/2024     7:27 AM 11/6/2024     7:15 AM   Heme   WBC (external) 3.5 - 10.5 x10(9)/L 6.2  8.2     7.1    Hgb (external) 13.5 - 17.5 g/dL 15.4  14.6     15.5    Plt (external) 150 - 450 x10(9)/L 291  284     287    ABSOLUTE NEUTROPHILS (EXTERNAL) 1.7 - 7.0 10(9)/L 3.5         ABSOLUTE LYMPHOCYTES (EXTERNAL) 1.0 - 4.8 10(9)/L 1.6         ABSOLUTE MONOCYTES (EXTERNAL) 0.2 - 0.9 10(9)/L 0.7         ABSOLUTE EOSINOPHILS (EXTERNAL) 0.0 - 0.5 10(9)/L 0.4         ABSOLUTE BASOPHILS (EXTERNAL) 0.0 - 0.3 10(9)/L 0.0             This result is from an external source.         Latest Ref Rng & Units 6/8/2022     8:11 AM 12/8/2021     8:00 AM 6/10/2021     9:27 AM   Liver   AP 40 - 150 U/L   84    AP (external) 40 - 150 U/L 106  121     TBili 0.2 - 1.3 mg/dL   0.4    TBili (external) 0.2 - 1.2 mg/dL 0.5  0.5     DBili (external) 0.0 - 0.5 mg/dL 0.1  0.2     ALT 0 - 70 U/L   32    ALT (external) 0 - 55 U/L 10  18     AST 0 - 45 U/L   32    AST (external) 10 - 40 U/L 22  27     Tot Protein 6.8 - 8.8 g/dL   7.2    Tot Protein (external) 6.4 - 8.3 g/dL 7.6  7.5     Albumin 3.4 - 5.0 g/dL   3.9    Albumin (external) 3.5 - 5.0 g/dL 3.9  4.0           Latest Ref Rng & Units 1/8/2025     7:35 AM 12/11/2024     7:27 AM 11/6/2024     7:15 AM   Pancreas   A1C (external) <=5.6 % 6.3         Amylase (external) 25 - 125 U/L 48  47     71    Lipase (external) 25 - 125 U/L 48  47     71        This result is from an external source.         Latest Ref Rng & Units 6/8/2022     8:11 AM   Iron studies   Iron (external) 8 - 78 U/L 124          Latest Ref Rng & Units 1/10/2024     7:26 AM 12/6/2023     7:31 AM 11/8/2023     7:36 AM   [facility] Virology   BK Quant Log Ext log IU/mL 1.54  1.73  1.66    BK Quant Result Ext IU/mL 34  54  45    BK Quant Spec Ext  Plasma        Failed to redirect to the Timeline version  Not on file   Occupational History    Not on file   Tobacco Use    Smoking status: Never    Smokeless tobacco: Never   Vaping Use    Vaping Use: Never used   Substance and Sexual Activity    Alcohol use: No     Alcohol/week: 0.0 oz    Drug use: Not Currently     Frequency: 7.0 times per week     Types: Marijuana, Oral    Sexual activity: Not Currently     Birth control/protection: Implant   Other Topics Concern    Not on file   Social History Narrative    ** Merged History Encounter **          Social Determinants of Health     Financial Resource Strain: Medium Risk (4/30/2021)    Overall Financial Resource Strain (CARDIA)     Difficulty of Paying Living Expenses: Somewhat hard   Food Insecurity: Food Insecurity Present (4/30/2021)    Hunger Vital Sign     Worried About Running Out of Food in the Last Year: Sometimes true     Ran Out of Food in the Last Year: Sometimes true   Transportation Needs: No Transportation Needs (2/13/2023)    PRAPARE - Transportation     Lack of Transportation (Medical): No     Lack of Transportation (Non-Medical): No   Physical Activity: Not on file   Stress: Not on file   Social Connections: Not on file   Intimate Partner Violence: Not on file   Housing Stability: Low Risk  (9/29/2020)    Housing Stability Vital Sign     Unable to Pay for Housing in the Last Year: No     Number of Places Lived in the Last Year: 1     Unstable Housing in the Last Year: No          Physical Exam:  Physical Exam    Oriented x 3  Weight/BMI: There is no height or weight on file to calculate BMI.  There were no vitals taken for this visit.    Base Eye Exam       Visual Acuity (Snellen - Linear)         Right Left    Dist cc 20/20 20/20    Near cc J1 J1      Correction: Glasses              Tonometry (i-care, 1:17 PM)         Right Left    Pressure 25 24              Pupils         Pupils    Right PERRL    Left PERRL              Visual Fields         Right Left     Full Full                  Additional Tests   of the [facility].  Recent Labs   Lab Test 06/25/21  0630 06/27/21  0702 07/08/21  0829   DOSTAC 2,000 Not Provided Not Provided   TACROL 9.5 8.4 17.6*     Recent Labs   Lab Test 06/24/21  0642 06/25/21  0630 07/08/21  0829   DOSMPA 2000 6/23/2021 2,000 2015pm 7/7/21   MPACID 4.31* 1.43 7.94*   MPAG 100.9* 79.8 152.5*            Color         Right Left    Ishihara 9/9 9/9              Stereo       Fly: +    Animals: 3/3    Circles: 3/9                  Slit Lamp and Fundus Exam       External Exam         Right Left    External Normal Normal              Slit Lamp Exam         Right Left    Lids/Lashes Normal Normal    Conjunctiva/Sclera White and quiet White and quiet    Cornea Clear Clear    Anterior Chamber Deep and quiet Deep and quiet    Iris Round and reactive Round and reactive    Lens Clear Clear    Vitreous Normal Normal              Fundus Exam         Right Left    Disc Normal Normal    Macula Normal Normal    Vessels Normal Normal    Periphery Normal Normal                  Refraction       Wearing Rx         Sphere Cylinder Axis    Right -2.00 +2.00 123    Left -0.75 +1.50 080      Age: 1yr    Type: SVL              Manifest Refraction (Auto)         Sphere Cylinder Axis    Right -2.25 +2.50 108    Left -1.50 +2.50 082                    Pertinent Lab/Test/Imaging Review:      Assessment and Plan:     Optic neuritis  8/29/2023-recent admission for bilateral decreased vision associated with ocular pain on eye movements.  But diagnosed with possible optic neuritis.  Had complete work-up including MRI and MRV that were negative.  Because of a past history of possible IIH a lumbar puncture was performed.  Opening pressure was normal.  1 white blood cell.  NMO, MOG, and AI were negative.  She was treated with 3 days of intravenous Solu-Medrol and is currently on an oral steroid taper.  She states that her visual acuity is returned to normal.  She believes this was related to recent institution of Humira a month ago.  However on examination today there is no evidence of any underlying optic nerve disorder.  Her OCT neurofibrillary thickness is 95 OD 92 OS with 0.4 cup sharp discs no swelling or pallor.  Therefore I am uncertain as to the etiology of her vision loss.  We will monitor on a steroid taper.    IIH (idiopathic  intracranial hypertension)  8/26/2023-prior patient of Dr. Yousif.  Carries a diagnosis of possible IAH but was not taking Diamox.  On examination today there is no evidence of papilledema.  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  I do not detect any overt lenticular dislocation.  There is no apparent retinal thinning.    Greater than 60 minutes total time spent.  This included extensive review of admission records at [facility], reviewing laboratories at [facility], reviewing MRI images, counseling patient on findings, formulating note in epic.    John Franks M.D.

## 2025-01-21 ENCOUNTER — HOSPITAL ENCOUNTER (EMERGENCY)
Facility: MEDICAL CENTER | Age: 35
End: 2025-01-21
Attending: EMERGENCY MEDICINE
Payer: MEDICARE

## 2025-01-21 ENCOUNTER — APPOINTMENT (OUTPATIENT)
Dept: RADIOLOGY | Facility: MEDICAL CENTER | Age: 35
End: 2025-01-21
Attending: EMERGENCY MEDICINE
Payer: MEDICARE

## 2025-01-21 VITALS
TEMPERATURE: 98.6 F | BODY MASS INDEX: 45.58 KG/M2 | WEIGHT: 267 LBS | OXYGEN SATURATION: 96 % | RESPIRATION RATE: 20 BRPM | HEIGHT: 64 IN | HEART RATE: 84 BPM | SYSTOLIC BLOOD PRESSURE: 157 MMHG | DIASTOLIC BLOOD PRESSURE: 87 MMHG

## 2025-01-21 DIAGNOSIS — R07.9 CHRONIC CHEST PAIN: ICD-10-CM

## 2025-01-21 DIAGNOSIS — G89.29 CHRONIC CHEST PAIN: ICD-10-CM

## 2025-01-21 LAB
ALBUMIN SERPL BCP-MCNC: 4.7 G/DL (ref 3.2–4.9)
ALBUMIN/GLOB SERPL: 2 G/DL
ALP SERPL-CCNC: 76 U/L (ref 30–99)
ALT SERPL-CCNC: 39 U/L (ref 2–50)
ANION GAP SERPL CALC-SCNC: 17 MMOL/L (ref 7–16)
AST SERPL-CCNC: 26 U/L (ref 12–45)
BASOPHILS # BLD AUTO: 0.7 % (ref 0–1.8)
BASOPHILS # BLD: 0.04 K/UL (ref 0–0.12)
BILIRUB SERPL-MCNC: 0.4 MG/DL (ref 0.1–1.5)
BUN SERPL-MCNC: 13 MG/DL (ref 8–22)
CALCIUM ALBUM COR SERPL-MCNC: 8.7 MG/DL (ref 8.5–10.5)
CALCIUM SERPL-MCNC: 9.3 MG/DL (ref 8.5–10.5)
CHLORIDE SERPL-SCNC: 109 MMOL/L (ref 96–112)
CO2 SERPL-SCNC: 16 MMOL/L (ref 20–33)
CREAT SERPL-MCNC: 0.63 MG/DL (ref 0.5–1.4)
EKG IMPRESSION: NORMAL
EOSINOPHIL # BLD AUTO: 0.34 K/UL (ref 0–0.51)
EOSINOPHIL NFR BLD: 6.4 % (ref 0–6.9)
ERYTHROCYTE [DISTWIDTH] IN BLOOD BY AUTOMATED COUNT: 41.1 FL (ref 35.9–50)
GFR SERPLBLD CREATININE-BSD FMLA CKD-EPI: 118 ML/MIN/1.73 M 2
GLOBULIN SER CALC-MCNC: 2.3 G/DL (ref 1.9–3.5)
GLUCOSE SERPL-MCNC: 106 MG/DL (ref 65–99)
HCG SERPL QL: NEGATIVE
HCT VFR BLD AUTO: 43 % (ref 37–47)
HGB BLD-MCNC: 14.8 G/DL (ref 12–16)
IMM GRANULOCYTES # BLD AUTO: 0.03 K/UL (ref 0–0.11)
IMM GRANULOCYTES NFR BLD AUTO: 0.6 % (ref 0–0.9)
LYMPHOCYTES # BLD AUTO: 1.65 K/UL (ref 1–4.8)
LYMPHOCYTES NFR BLD: 30.9 % (ref 22–41)
MCH RBC QN AUTO: 31.8 PG (ref 27–33)
MCHC RBC AUTO-ENTMCNC: 34.4 G/DL (ref 32.2–35.5)
MCV RBC AUTO: 92.3 FL (ref 81.4–97.8)
MONOCYTES # BLD AUTO: 0.47 K/UL (ref 0–0.85)
MONOCYTES NFR BLD AUTO: 8.8 % (ref 0–13.4)
NEUTROPHILS # BLD AUTO: 2.81 K/UL (ref 1.82–7.42)
NEUTROPHILS NFR BLD: 52.6 % (ref 44–72)
NRBC # BLD AUTO: 0 K/UL
NRBC BLD-RTO: 0 /100 WBC (ref 0–0.2)
NT-PROBNP SERPL IA-MCNC: 46 PG/ML (ref 0–125)
PLATELET # BLD AUTO: 185 K/UL (ref 164–446)
PMV BLD AUTO: 11.3 FL (ref 9–12.9)
POTASSIUM SERPL-SCNC: 4 MMOL/L (ref 3.6–5.5)
PROT SERPL-MCNC: 7 G/DL (ref 6–8.2)
RBC # BLD AUTO: 4.66 M/UL (ref 4.2–5.4)
SODIUM SERPL-SCNC: 142 MMOL/L (ref 135–145)
TROPONIN T SERPL-MCNC: <6 NG/L (ref 6–19)
WBC # BLD AUTO: 5.3 K/UL (ref 4.8–10.8)

## 2025-01-21 PROCEDURE — 84484 ASSAY OF TROPONIN QUANT: CPT

## 2025-01-21 PROCEDURE — A9270 NON-COVERED ITEM OR SERVICE: HCPCS | Mod: UD | Performed by: EMERGENCY MEDICINE

## 2025-01-21 PROCEDURE — 93005 ELECTROCARDIOGRAM TRACING: CPT | Mod: TC | Performed by: EMERGENCY MEDICINE

## 2025-01-21 PROCEDURE — 83880 ASSAY OF NATRIURETIC PEPTIDE: CPT

## 2025-01-21 PROCEDURE — 700102 HCHG RX REV CODE 250 W/ 637 OVERRIDE(OP): Mod: UD | Performed by: EMERGENCY MEDICINE

## 2025-01-21 PROCEDURE — 93005 ELECTROCARDIOGRAM TRACING: CPT | Mod: TC

## 2025-01-21 PROCEDURE — 85025 COMPLETE CBC W/AUTO DIFF WBC: CPT

## 2025-01-21 PROCEDURE — 80053 COMPREHEN METABOLIC PANEL: CPT

## 2025-01-21 PROCEDURE — 71045 X-RAY EXAM CHEST 1 VIEW: CPT

## 2025-01-21 PROCEDURE — 99285 EMERGENCY DEPT VISIT HI MDM: CPT

## 2025-01-21 PROCEDURE — 36415 COLL VENOUS BLD VENIPUNCTURE: CPT

## 2025-01-21 PROCEDURE — 84703 CHORIONIC GONADOTROPIN ASSAY: CPT

## 2025-01-21 RX ORDER — METOPROLOL SUCCINATE 25 MG/1
25 TABLET, EXTENDED RELEASE ORAL ONCE
Status: COMPLETED | OUTPATIENT
Start: 2025-01-21 | End: 2025-01-21

## 2025-01-21 RX ADMIN — METOPROLOL SUCCINATE 25 MG: 25 TABLET, EXTENDED RELEASE ORAL at 16:06

## 2025-01-21 ASSESSMENT — PAIN DESCRIPTION - PAIN TYPE: TYPE: ACUTE PAIN

## 2025-01-21 ASSESSMENT — FIBROSIS 4 INDEX: FIB4 SCORE: 0.74

## 2025-01-21 NOTE — DISCHARGE INSTRUCTIONS
We could not find signs of a dangerous cause of your chest pain.  Resume your medications.  Follow-up with your cardiologist to reschedule his stress test.  Ask your cardiologist to specify in the order that the clinic should perform your stress test even if you are dizzy having chest pain or short of breath.

## 2025-01-21 NOTE — ED TRIAGE NOTES
Chief Complaint   Patient presents with    Chest Pain     Ongoing for about a day. Pt reports she has been off her heart medications for about 48 hours so she could have her stress test today. Pt brought here from [facility] due to her symptoms and was unable to have her test due to symptoms.      Pt wheeled to triage for above complaint. Pt reports feeling dizzy, nauseated, short of breath, and having chest pain. Pt was unable to get the stress test today because she presented with all the above complaints.

## 2025-01-21 NOTE — ED PROVIDER NOTES
ED Provider Note    CHIEF COMPLAINT  Chief Complaint   Patient presents with    Chest Pain     Ongoing for about a day. Pt reports she has been off her heart medications for about 48 hours so she could have her stress test today. Pt brought here from [facility] medicine due to her symptoms and was unable to have her test due to symptoms.        EXTERNAL RECORDS REVIEWED  Clinic note from January 13 reviewed.  Patient has history of palpitations and SVT.  She is had a sinus node ablation for inappropriate sinus tachycardia.  She has POTS.  She is on Corlanor and beta-blocker.  She has obstructive sleep apnea.  She has pseudotumor cerebri.  She has optic neuropathy and chronic pain.  She has bladder incontinence and sacral stimulator.  She has cervical neuralgia with leg weakness and nephrolithiasis.  She has had a hip fracture and psychiatric issues.  She reported 2 ER visits for chest pain.  Dr. Ch has stated he felt her chest pain was noncardiac.  Stress test ordered.    ER note reviewed from January 12 for chest pain and shortness of breath.  History of diabetes.  Chest x-ray and labs including troponin BMP and TSH checked and normal.    Patient was also seen in the ER on January 2 for chest pain shortness of breath and irregular heartbeat.  Once again chest x-ray labs including troponin and EKG were normal.  Heart score was 1.    Her latest stress test in our system was December 2022 and was normal with an EF of 70%.  Latest echo from the same time with EF of 55% was otherwise normal.    HPI    Kristin Balderrama is a 34 y.o. female who presents to the Emergency Department for chest pain dizziness and tachycardia.  Her chest pain started when she stopped her rate control medications 2 days ago to permit a cardiac stress test today.  When she had the symptoms and the stress test clinic they refused to perform the test and sent her to the ER.  The patient has chronic intermittent chest pain and has been  "seen in the ER already twice since January 1.  She is had 2 prior negative workups.  Her cardiologist feels her pain is nonischemic.  Pain is in the left chest and nonradiating.  She has had sweats and shortness of breath but no nausea.  No leg swelling calf pain or thromboembolism.  She has a history of diabetes and a family history coronary artery disease.  No tobacco use hypertension or dyslipidemia.    LIMITATION TO HISTORY   None    OUTSIDE HISTORIAN(S):  None    REVIEW OF SYSTEMS  Pertinent positives include: Chest pain dizziness tachycardia.  Pertinent negatives include: Vomiting fever cough.      PAST MEDICAL HISTORY  Past Medical History:   Diagnosis Date    Abdominal pain     Anginal syndrome     Random chest pain especially with tachycardia    Apnea, sleep     Arthritis     ASTHMA     when around smoke    Back pain     Borderline personality disorder (HCC)     Chickenpox     Chronic UTI 09/18/2010    Daytime sleepiness     Dental disorder 03/08/2021    Infected tooth    Depression     Diabetes (HCC)     Diarrhea     Incontinece    Disorder of thyroid     Hashimoto's    Fatigue     Frequent headaches     Heart burn     History of falling     Inappropriate sinus tachycardia (HCC) 2016    Statusp post ablation by Dr. Kumar.    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    Urinary bladder disorder     Suprapubic cath. 2/8/22: Not " anymore.     Urinary incontinence     Weakness     Wears glasses        FAMILY HISTORY  Family History   Problem Relation Age of Onset    Hypertension Mother     Sleep Apnea Mother     Heart Disease Mother     Other Mother         hypothryod    No Known Problems Sister     Other Sister         Narcolepsy;fibromyalsia;bone;nerve    Genitourinary () Problems Sister         endometriosis    Hypertension Maternal Uncle     Heart Disease Maternal Grandmother     Hypertension Maternal Grandmother     Cancer Neg Hx        SOCIAL HISTORY  Social History     Tobacco Use    Smoking status: Never    Smokeless tobacco: Never   Vaping Use    Vaping status: Never Used   Substance Use Topics    Alcohol use: No     Alcohol/week: 0.0 oz    Drug use: Never     Social History     Substance and Sexual Activity   Drug Use Never       SURGICAL HISTORY  Past Surgical History:   Procedure Laterality Date    OH CYSTOSCOPY,INSERT URETERAL STENT Right 2/12/2024    Procedure: CYSTOSCOPY, WITH RIGHT URETEROSCOPY, WITH LITHOTRIPSY, WITH INSERTION OF RIGHT URETERAL STENT;  Surgeon: Josafat Roberson M.D.;  Location: [facility];  Service: Urology    OH CYSTO/URETERO/PYELOSCOPY, DX Right 2/12/2024    Procedure: URETEROSCOPY;  Surgeon: Josafat Roberson M.D.;  Location: [facility];  Service: Urology    LASERTRIPSY Right 2/12/2024    Procedure: LITHOTRIPSY, USING LASER;  Surgeon: Josafat Roberson M.D.;  Location: [facility];  Service: Urology    INGUINAL HERNIA LAPAROSCOPIC Right 07/21/2023    Procedure: LAPAROSCOPIC RIGHT INGUINAL HERNIA REPAIR WITH MESH;  Surgeon: Joe Noyola M.D.;  Location: [facility];  Service: General    HERNIA REPAIR Right 07/21/2023    PB PERCUT FIX PBOX/NECK FEMUR FX Left 01/28/2023    Procedure: FIXATION, HIP, USING CANNULATED SCREW;  Surgeon: Noman Abdul M.D.;  Location: [facility];  Service: Orthopedics    OH LAP,DIAGNOSTIC ABDOMEN  02/14/2022    Procedure: LAPAROSCOPY;   Surgeon: Seamus Pisano M.D.;  Location: SURGERY SAME DAY [facility];  Service: Gynecology    OVARIAN CYSTECTOMY Right 02/14/2022    Procedure: EXCISION, CYST, OVARY;  Surgeon: Seamus Pisano M.D.;  Location: SURGERY SAME DAY [facility];  Service: Gynecology    BOWEL STIMULATOR INSERTION  03/10/2021    Procedure: INSERTION, ELECTRODE LEADS AND PULSE GENERATOR, NEUROSTIMULATOR, SACRAL - REMOVAL OF INTERSTIM WITH REPLACEMENT OF SACRAL NEUROMODULATION DEVICE;  Surgeon: Joe Noyola M.D.;  Location: SURGERY [facility];  Service: General    MUSCLE BIOPSY Right 01/26/2017    Procedure: MUSCLE BIOPSY - THIGH;  Surgeon: Isidro Vigil M.D.;  Location: SURGERY [facility];  Service:     GASTROSCOPY WITH BALLOON DILATATION N/A 01/04/2017    Procedure: GASTROSCOPY WITH DILATATION;  Surgeon: Torres Vargas M.D.;  Location: [facility];  Service:     BOWEL STIMULATOR INSERTION  07/13/2016    Procedure: BOWEL STIMULATOR INSERTION FOR PERMANENT INTERSTIM SACRAL IMPLANT;  Surgeon: Joe Noyola M.D.;  Location: SURGERY [facility];  Service:     RECOVERY  01/27/2016    Procedure: CATH LAB EP STUDY WITH SINUS NODE MODIFICATION LA;  Surgeon: Recoveryonly Surgery;  [place] [facility]  Service:     OTHER CARDIAC SURGERY  01/2016    cardiac ablation    NEURO DEST FACET L/S W/IG SNGL  04/21/2015    Performed by Reza Tabor at [facility]    LUMBAR FUSION ANTERIOR  08/21/2012    Performed by SUSIE SAWANT at [facility]    ALYSSA BY LAPAROSCOPY  08/29/2010    Performed by SHAYY JOHNS at [facility] ORS    LAMINOTOMY      OTHER ABDOMINAL SURGERY      TONSILLECTOMY      tonsillectomy       CURRENT MEDICATIONS  No current facility-administered medications for this encounter.    Current Outpatient Medications:     gabapentin (NEURONTIN) 400 MG Cap, TAKE 3 CAPSULES BY MOUTH THREE TIMES DAILY., Disp: 270 Capsule, Rfl: 0    naproxen (NAPROSYN) 500 MG Tab,  Take 1 Tablet by mouth Twice A Day, Disp: 60 Tablet, Rfl: 0    tizanidine (ZANAFLEX) 4 MG Tab, Take 1 Tablet by mouth Three Times A Day as needed, Disp: 90 Tablet, Rfl: 0    omeprazole (PRILOSEC) 20 MG delayed-release capsule, Take 2 Capsules by mouth 2 times a day., Disp: 180 Capsule, Rfl: 3    loperamide (IMODIUM) 2 MG Cap, TAKE 1 CAPSULE BY MOUTH FOUR TIMES A DAY AS NEEDED FOR DIARRHEA, Disp: , Rfl:     ondansetron (ZOFRAN ODT) 4 MG TABLET DISPERSIBLE, DISSOLVE 1 TABLET ON THE TONGUE EVERY 8 HOURS FOR 5 DAYS AS NEEDED FOR NAUSEA/VOMITING, Disp: , Rfl:     topiramate (TOPAMAX) 50 MG tablet, Take 1 Tablet by mouth 2 times a day., Disp: 180 Tablet, Rfl: 4    prochlorperazine (COMPAZINE) 10 MG Tab, Take 1 Tablet by mouth every 6 hours as needed for Nausea/Vomiting., Disp: 30 Tablet, Rfl: 0    tizanidine (ZANAFLEX) 4 MG capsule, take 1 Tablet Three Times A Day as needed Qty 90  Dx.M54.16, Disp: 90 Capsule, Rfl: 0    scopolamine (TRANSDERM SCOP, 1.5 MG,) 1 mg/72hr PATCH 72 HR, Place 1 Patch on the skin every 72 hours., Disp: 7 Patch, Rfl: 1    ipratropium-albuterol (COMBIVENT RESPIMAT)  MCG/ACT Aero Soln, Inhale 2 Puffs 4 times a day as needed (shortness of breath, wheezing)., Disp: 4 g, Rfl: 1    albuterol (PROVENTIL) 2.5mg/3ml Nebu Soln solution for nebulization, Take 3 mL by nebulization every four hours as needed for Shortness of Breath., Disp: 75 mL, Rfl: 0    ziprasidone (GEODON) 40 MG Cap, Take 1 capsule by mouth at bedtime., Disp: 90 Capsule, Rfl: 1    amLODIPine (NORVASC) 5 MG Tab, Take 1 tablet by mouth every day., Disp: 90 Tablet, Rfl: 1    metoprolol SR (TOPROL XL) 25 MG TABLET SR 24 HR, Take 1 Tablet by mouth 2 times a day., Disp: 200 Tablet, Rfl: 3    Galcanezumab-gnlm (EMGALITY) 120 MG/ML Solution Auto-injector, Inject 1 mL subcutaneously every month., Disp: 1 mL, Rfl: 11    lamoTRIgine (LAMICTAL) 25 MG Tab, Take 2 Tablets by mouth 2 times a day., Disp: 360 Tablet, Rfl: 1    Rimegepant Sulfate  "(NURTEC) 75 MG TABLET DISPERSIBLE, Take 1 tablet by mouth at onset of migraine or aura okay to repeat in 24 hours if needed., Disp: 8 Tablet, Rfl: 5    sumatriptan (IMITREX) 20 MG/ACT nasal spray, Give 1 dose at onset headache okay to repeat in 2 hours if needed for max of 2 doses in a 24 hour timeframe., Disp: 6 Each, Rfl: 5    ivabradine (CORLANOR) 7.5 MG Tab tablet, Take 1 Tablet by mouth 2 times a day with meals., Disp: 60 Tablet, Rfl: 3    diphenhydrAMINE (BENADRYL) 25 MG Tab, Take 25-50 mg by mouth 2 times a day. Scheduled  25 mg = AM 50 MG = PM, Disp: , Rfl:     Melatonin 10 MG Tab, Take 10 mg by mouth at bedtime., Disp: , Rfl:     spironolactone (ALDACTONE) 25 MG Tab, Take 25 mg by mouth every day. (Patient taking differently: Take 50 mg by mouth every day.), Disp: , Rfl:     ALLERGIES  Allergies   Allergen Reactions    Cefdinir Shortness of Breath and Itching     Tolerated 1/18/17  Tolerates ceftriaxone  Tolerated augmentin 8/2019     Depakote [Divalproex Sodium] Unspecified     Muscle spasms/muscle pain and weakness      Doxycycline Anaphylaxis and Vomiting     Other reaction(s): pustules/blisters  Other reaction(s): stomach pain    Montelukast [Singulair] Unspecified     Cardiac effusion    Vancomycin Itching     Pt becomes flushed in face and chest.   RXN=7/10/16    Wound Dressing Adhesive Rash     By pt report-\"removes skin totally off\"    Amitriptyline Unspecified     Headaches      Amoxicillin Rash      Tolerates augmentin    Aripiprazole [Abilify] Unspecified     Headaches/muscle twitching      Clindamycin Nausea         Other reaction(s): nausea stomach pain    Erythromycin Rash     .  Other reaction(s): nausea stomach pain    Flomax [Tamsulosin Hydrochloride] Swelling    Hydromorphone      Other reaction(s): vomiting    Levaquin Unspecified     Severe muscle cramps in legs  RXN=unknown  Other reaction(s): leg muscle cramps    Metformin Unspecified     Increased lactic acid     Other reaction(s): " "itching and rash/nausea vomiting    Sulfa Drugs Hives, Itching, Myalgia and Unspecified     Muscle pain and weakness    Other reaction(s): unknown    Tamsulosin Swelling     Swelling of legs    Tape Rash     Tears skin off  coban with Tegaderm tape ok intermittently  RXN=ongoing    Sulfamethoxazole W-Trimethoprim Rash    Ciprofloxacin Rash          Keflex Rash     Pt states she gets a rash with this medication  Tolerates ceftriaxone  Can take with Benadryl    Levofloxacin Unspecified     Leg muscle cramps    Metronidazole Rash     \"Vision problems\"  Other reaction(s): vision problems       PHYSICAL EXAM  VITAL SIGNS: BP (!) 175/105   Pulse (!) 111   Temp 37 °C (98.6 °F) (Temporal)   Resp 20   Ht 1.626 m (5' 4\")   Wt 121 kg (267 lb)   SpO2 98%   BMI 45.83 kg/m²   Reviewed and tachycardic hypertensive afebrile borderline tachypneic  Constitutional: Well developed, Well nourished, well-appearing elevated BMI.  HENT: Normocephalic, atraumatic, bilateral external ears normal, No intraoral erythema, edema, exudate  Eyes: PERRLA, conjunctiva pink, no scleral icterus.   Cardiovascular: Tachycardic regular rate and rhythm. No murmurs, rubs or gallops.  No dependent edema or calf tenderness  Respiratory: Lungs clear to auscultation bilaterally. No wheezes, rales, or rhonchi.  Abdominal:  Abdomen soft, non-tender, non distended. No rebound, or guarding.    Skin: No erythema, no rash. No wounds or bruising.  Genitourinary: No costovertebral angle tenderness.   Musculoskeletal: no deformities.  No chest wall tenderness  Neurologic: Alert & oriented x 3, cranial nerves 2-12 intact by passive exam.  Moves 4 limbs with symmetric strength.  Psychiatric: Affect normal, Judgment normal, Mood normal.     LABS Ordered and Reviewed by Me: Full lab workup ordered by nursing triage protocol  Results for orders placed or performed during the hospital encounter of 01/21/25   CBC with Differential    Collection Time: 01/21/25  3:13 PM "   Result Value Ref Range    WBC 5.3 4.8 - 10.8 K/uL    RBC 4.66 4.20 - 5.40 M/uL    Hemoglobin 14.8 12.0 - 16.0 g/dL    Hematocrit 43.0 37.0 - 47.0 %    MCV 92.3 81.4 - 97.8 fL    MCH 31.8 27.0 - 33.0 pg    MCHC 34.4 32.2 - 35.5 g/dL    RDW 41.1 35.9 - 50.0 fL    Platelet Count 185 164 - 446 K/uL    MPV 11.3 9.0 - 12.9 fL    Neutrophils-Polys 52.60 44.00 - 72.00 %    Lymphocytes 30.90 22.00 - 41.00 %    Monocytes 8.80 0.00 - 13.40 %    Eosinophils 6.40 0.00 - 6.90 %    Basophils 0.70 0.00 - 1.80 %    Immature Granulocytes 0.60 0.00 - 0.90 %    Nucleated RBC 0.00 0.00 - 0.20 /100 WBC    Neutrophils (Absolute) 2.81 1.82 - 7.42 K/uL    Lymphs (Absolute) 1.65 1.00 - 4.80 K/uL    Monos (Absolute) 0.47 0.00 - 0.85 K/uL    Eos (Absolute) 0.34 0.00 - 0.51 K/uL    Baso (Absolute) 0.04 0.00 - 0.12 K/uL    Immature Granulocytes (abs) 0.03 0.00 - 0.11 K/uL    NRBC (Absolute) 0.00 K/uL   Complete Metabolic Panel (CMP)    Collection Time: 01/21/25  3:13 PM   Result Value Ref Range    Sodium 142 135 - 145 mmol/L    Potassium 4.0 3.6 - 5.5 mmol/L    Chloride 109 96 - 112 mmol/L    Co2 16 (L) 20 - 33 mmol/L    Anion Gap 17.0 (H) 7.0 - 16.0    Glucose 106 (H) 65 - 99 mg/dL    Bun 13 8 - 22 mg/dL    Creatinine 0.63 0.50 - 1.40 mg/dL    Calcium 9.3 8.5 - 10.5 mg/dL    Correct Calcium 8.7 8.5 - 10.5 mg/dL    AST(SGOT) 26 12 - 45 U/L    ALT(SGPT) 39 2 - 50 U/L    Alkaline Phosphatase 76 30 - 99 U/L    Total Bilirubin 0.4 0.1 - 1.5 mg/dL    Albumin 4.7 3.2 - 4.9 g/dL    Total Protein 7.0 6.0 - 8.2 g/dL    Globulin 2.3 1.9 - 3.5 g/dL    A-G Ratio 2.0 g/dL   proBrain Natriuretic Peptide, NT (BNP_    Collection Time: 01/21/25  3:13 PM   Result Value Ref Range    NT-proBNP 46 0 - 125 pg/mL   Troponins NOW    Collection Time: 01/21/25  3:13 PM   Result Value Ref Range    Troponin T <6 6 - 19 ng/L   HCG Qual Serum    Collection Time: 01/21/25  3:13 PM   Result Value Ref Range    Beta-Hcg Qualitative Serum Negative Negative   ESTIMATED GFR     Collection Time: 01/21/25  3:13 PM   Result Value Ref Range    GFR (CKD-EPI) 118 >60 mL/min/1.73 m 2     *Note: Due to a large number of results and/or encounters for the requested time period, some results have not been displayed. A complete set of results can be found in Results Review.       Interpretations:    Pulse Ox: Normal at 98% on room air    EKG Interpretation by me    Indication: Chest pain    Rhythm: normal sinus   Rate: Tachycardic at 106  Axis: normal  Ectopy: none  Conduction: Prolonged QT C  ST/T Waves: no acute change  Q Waves: none  R Wave progression: normal  Hypertrophy changes: Absent    Comparison: Prolonged QTc is new    Clinical Impression: Sinus tachycardia with prolonged QT interval    RADIOLOGY  I have independently interpreted the chest x-ray associated with this visit demonstrating no pneumonia or pneumothorax.  I am awaiting the final reading from the radiologist.     Final Radiology Report  DX-CHEST-PORTABLE (1 VIEW)   Final Result      1.  Low lung volumes without definite acute cardiopulmonary abnormality.        Radiologist interpretation have been reviewed by me.     ED COURSE:      INTERVENTIONS BY ME:  Medications   metoprolol SR (Toprol XL) tablet 25 mg (has no administration in time range)       ASSESSMENT, COURSE AND PLAN:  PROBLEMS EVALUATED THIS VISIT:    This patient with chronic chest pain presents with chest pain.  Her pain is considered to be nonischemic by her cardiologist.  She has never had thromboembolism.  There is no clinical pneumonia or pneumothorax.  Her episode today was probably triggered by anxiety had been off her meds.  Unfortunately she was refused the stress test her cardiologist wanted.  The patient also has tachycardia and has known tachycardia.  She is normally treated with metoprolol and Corlanor but is off both for 2 days to permit a stress test.  She is not unstable.  But also this chronic condition is not stable.    Measures: Heart score is 1  making her low risk    DISPOSITION AND DISCUSSIONS    RISK:  Low     PLAN:  Resume your medications    Follow-up with your cardiologist and asked them to order your stress test and request that it be performed even in the presence of tachycardia shortness of breath dizziness or chest pain.    Nonspecific chest pain handout given    Followup:  Fly Goodson M.D.  [address] [place] [id number]  [phone]      As needed      CONDITION: Stable.     FINAL IMPRESSION  1. Chronic chest pain         Joe Robertson M.D., 01/21/25 2:49 PM

## 2025-01-22 ENCOUNTER — PHARMACY VISIT (OUTPATIENT)
Dept: PHARMACY | Facility: MEDICAL CENTER | Age: 35
End: 2025-01-22
Payer: COMMERCIAL

## 2025-01-22 ENCOUNTER — OFFICE VISIT (OUTPATIENT)
Dept: URGENT CARE | Facility: CLINIC | Age: 35
End: 2025-01-22
Payer: MEDICARE

## 2025-01-22 VITALS
DIASTOLIC BLOOD PRESSURE: 76 MMHG | BODY MASS INDEX: 45.58 KG/M2 | HEIGHT: 64 IN | HEART RATE: 81 BPM | RESPIRATION RATE: 20 BRPM | OXYGEN SATURATION: 97 % | SYSTOLIC BLOOD PRESSURE: 116 MMHG | TEMPERATURE: 97.9 F | WEIGHT: 267 LBS

## 2025-01-22 DIAGNOSIS — L08.9 SKIN INFECTION: ICD-10-CM

## 2025-01-22 DIAGNOSIS — H60.391 ACUTE INFECTIVE OTITIS EXTERNA, RIGHT: ICD-10-CM

## 2025-01-22 PROCEDURE — 99214 OFFICE O/P EST MOD 30 MIN: CPT | Performed by: NURSE PRACTITIONER

## 2025-01-22 PROCEDURE — 3078F DIAST BP <80 MM HG: CPT | Performed by: NURSE PRACTITIONER

## 2025-01-22 PROCEDURE — RXMED WILLOW AMBULATORY MEDICATION CHARGE: Performed by: NURSE PRACTITIONER

## 2025-01-22 PROCEDURE — RXOTC WILLOW AMBULATORY OTC CHARGE

## 2025-01-22 PROCEDURE — 3074F SYST BP LT 130 MM HG: CPT | Performed by: NURSE PRACTITIONER

## 2025-01-22 RX ORDER — CLINDAMYCIN HYDROCHLORIDE 300 MG/1
300 CAPSULE ORAL 3 TIMES DAILY
Qty: 21 CAPSULE | Refills: 0 | Status: SHIPPED | OUTPATIENT
Start: 2025-01-22 | End: 2025-01-29

## 2025-01-22 RX ORDER — CIPROFLOXACIN AND DEXAMETHASONE 3; 1 MG/ML; MG/ML
4 SUSPENSION/ DROPS AURICULAR (OTIC) 2 TIMES DAILY
Qty: 7.5 ML | Refills: 0 | Status: SHIPPED | OUTPATIENT
Start: 2025-01-22 | End: 2025-02-10

## 2025-01-22 ASSESSMENT — ENCOUNTER SYMPTOMS
SINUS PAIN: 0
TINGLING: 0
DIZZINESS: 0
NAUSEA: 0
COUGH: 0
DIARRHEA: 0
MYALGIAS: 0
CHILLS: 0
SENSORY CHANGE: 0
FEVER: 0

## 2025-01-22 ASSESSMENT — FIBROSIS 4 INDEX: FIB4 SCORE: 0.79

## 2025-01-22 NOTE — PROGRESS NOTES
Subjective     Kristin Balderrama is a 34 y.o. female who presents with Other (X1day. HS sores in both underarms. X2weeks. Swelling behind R ear. X2days. Dizziness )            HPI  New problem.  Patient is a 34-year-old female who presents with a 1 day history of sores under both of her arms and a 2-week history of swelling of her right ear.  She is also presenting with some dizziness.  She denies any swelling or discharge from the sores underneath bilateral axillary areas.  She states she has chronic otitis externa of this right ear and was last treated for this on December 31 with Ciprodex.  She reports her sores are usually treated with doxycycline however in her allergy list it lists anaphylaxis as a possible reaction.  She denies fever, chills, myalgia that is new, nausea, or diarrhea.    Cefdinir, Depakote [divalproex sodium], Doxycycline, Montelukast [singulair], Vancomycin, Wound dressing adhesive, Amitriptyline, Amoxicillin, Aripiprazole [abilify], Clindamycin, Erythromycin, Flomax [tamsulosin hydrochloride], Hydromorphone, Levaquin, Metformin, Sulfa drugs, Tamsulosin, Tape, Sulfamethoxazole w-trimethoprim, Ciprofloxacin, Keflex, Levofloxacin, and Metronidazole  Current Outpatient Medications on File Prior to Visit   Medication Sig Dispense Refill    gabapentin (NEURONTIN) 400 MG Cap TAKE 3 CAPSULES BY MOUTH THREE TIMES DAILY. 270 Capsule 0    naproxen (NAPROSYN) 500 MG Tab Take 1 Tablet by mouth Twice A Day 60 Tablet 0    tizanidine (ZANAFLEX) 4 MG Tab Take 1 Tablet by mouth Three Times A Day as needed 90 Tablet 0    omeprazole (PRILOSEC) 20 MG delayed-release capsule Take 2 Capsules by mouth 2 times a day. 180 Capsule 3    loperamide (IMODIUM) 2 MG Cap TAKE 1 CAPSULE BY MOUTH FOUR TIMES A DAY AS NEEDED FOR DIARRHEA      ondansetron (ZOFRAN ODT) 4 MG TABLET DISPERSIBLE DISSOLVE 1 TABLET ON THE TONGUE EVERY 8 HOURS FOR 5 DAYS AS NEEDED FOR NAUSEA/VOMITING      topiramate (TOPAMAX) 50 MG tablet Take 1  Tablet by mouth 2 times a day. 180 Tablet 4    prochlorperazine (COMPAZINE) 10 MG Tab Take 1 Tablet by mouth every 6 hours as needed for Nausea/Vomiting. 30 Tablet 0    tizanidine (ZANAFLEX) 4 MG capsule take 1 Tablet Three Times A Day as needed Qty 90  Dx.M54.16 90 Capsule 0    scopolamine (TRANSDERM SCOP, 1.5 MG,) 1 mg/72hr PATCH 72 HR Place 1 Patch on the skin every 72 hours. 7 Patch 1    ipratropium-albuterol (COMBIVENT RESPIMAT)  MCG/ACT Aero Soln Inhale 2 Puffs 4 times a day as needed (shortness of breath, wheezing). 4 g 1    albuterol (PROVENTIL) 2.5mg/3ml Nebu Soln solution for nebulization Take 3 mL by nebulization every four hours as needed for Shortness of Breath. 75 mL 0    ziprasidone (GEODON) 40 MG Cap Take 1 capsule by mouth at bedtime. 90 Capsule 1    amLODIPine (NORVASC) 5 MG Tab Take 1 tablet by mouth every day. 90 Tablet 1    metoprolol SR (TOPROL XL) 25 MG TABLET SR 24 HR Take 1 Tablet by mouth 2 times a day. 200 Tablet 3    Galcanezumab-gnlm (EMGALITY) 120 MG/ML Solution Auto-injector Inject 1 mL subcutaneously every month. 1 mL 11    lamoTRIgine (LAMICTAL) 25 MG Tab Take 2 Tablets by mouth 2 times a day. 360 Tablet 1    Rimegepant Sulfate (NURTEC) 75 MG TABLET DISPERSIBLE Take 1 tablet by mouth at onset of migraine or aura okay to repeat in 24 hours if needed. 8 Tablet 5    sumatriptan (IMITREX) 20 MG/ACT nasal spray Give 1 dose at onset headache okay to repeat in 2 hours if needed for max of 2 doses in a 24 hour timeframe. 6 Each 5    ivabradine (CORLANOR) 7.5 MG Tab tablet Take 1 Tablet by mouth 2 times a day with meals. 60 Tablet 3    diphenhydrAMINE (BENADRYL) 25 MG Tab Take 25-50 mg by mouth 2 times a day. Scheduled    25 mg = AM  50 MG = PM      Melatonin 10 MG Tab Take 10 mg by mouth at bedtime.      spironolactone (ALDACTONE) 25 MG Tab Take 25 mg by mouth every day. (Patient taking differently: Take 50 mg by mouth every day.)       No current facility-administered medications on  file prior to visit.     Social History     Socioeconomic History    Marital status: Single     Spouse name: Not on file    Number of children: Not on file    Years of education: Not on file    Highest education level: Not on file   Occupational History    Not on file   Tobacco Use    Smoking status: Never    Smokeless tobacco: Never   Vaping Use    Vaping status: Never Used   Substance and Sexual Activity    Alcohol use: No     Alcohol/week: 0.0 oz    Drug use: Never    Sexual activity: Not Currently     Birth control/protection: Implant   Other Topics Concern    Not on file   Social History Narrative    ** Merged History Encounter **          Social Drivers of Health     Financial Resource Strain: Medium Risk (4/30/2021)    Overall Financial Resource Strain (CARDIA)     Difficulty of Paying Living Expenses: Somewhat hard   Food Insecurity: No Food Insecurity (10/3/2024)    Hunger Vital Sign     Worried About Running Out of Food in the Last Year: Never true     Ran Out of Food in the Last Year: Never true   Transportation Needs: No Transportation Needs (10/3/2024)    PRAPARE - Transportation     Lack of Transportation (Medical): No     Lack of Transportation (Non-Medical): No   Physical Activity: Not on file   Stress: Not on file   Social Connections: Not on file   Intimate Partner Violence: Not At Risk (10/3/2024)    Humiliation, Afraid, Rape, and Kick questionnaire     Fear of Current or Ex-Partner: No     Emotionally Abused: No     Physically Abused: No     Sexually Abused: No   Housing Stability: Low Risk  (10/3/2024)    Housing Stability Vital Sign     Unable to Pay for Housing in the Last Year: No     Number of Times Moved in the Last Year: 0     Homeless in the Last Year: No     Breast Cancer-related family history is not on file.      Review of Systems   Constitutional:  Negative for chills, fever and malaise/fatigue.   HENT:  Positive for ear pain. Negative for congestion and sinus pain.    Respiratory:   "Negative for cough.    Gastrointestinal:  Negative for diarrhea and nausea.   Musculoskeletal:  Negative for myalgias.   Skin:  Positive for rash.   Neurological:  Negative for dizziness, tingling and sensory change.   All other systems reviewed and are negative.             Objective     /76   Pulse 81   Temp 36.6 °C (97.9 °F)   Resp 20   Ht 1.626 m (5' 4\")   Wt 121 kg (267 lb)   SpO2 97%   BMI 45.83 kg/m²      Physical Exam  Vitals and nursing note reviewed.   Constitutional:       General: She is not in acute distress.     Appearance: She is well-developed.   HENT:      Head: Normocephalic.      Right Ear: Tympanic membrane and external ear normal.      Left Ear: Tympanic membrane, ear canal and external ear normal.      Ears:      Comments: Patient with swollen, erythematous ear canal.  She does have pain with tragal and helix manipulation.     Nose: Mucosal edema present. No rhinorrhea.      Mouth/Throat:      Pharynx: No posterior oropharyngeal erythema.   Eyes:      General:         Right eye: No discharge.         Left eye: No discharge.      Conjunctiva/sclera: Conjunctivae normal.   Cardiovascular:      Rate and Rhythm: Normal rate and regular rhythm.      Heart sounds: Normal heart sounds.   Pulmonary:      Effort: Pulmonary effort is normal.      Breath sounds: Normal breath sounds.   Musculoskeletal:         General: Normal range of motion.      Cervical back: Normal range of motion and neck supple.   Lymphadenopathy:      Cervical: No cervical adenopathy.   Skin:     General: Skin is warm and dry.      Comments: Patient with multiple red sores in bilateral axillary areas.  None appear to be swollen and there is no discharge present.   Neurological:      Mental Status: She is alert and oriented to person, place, and time.   Psychiatric:         Behavior: Behavior normal.         Thought Content: Thought content normal.                             Assessment & Plan   1. Skin infection  " clindamycin (CLEOCIN) 300 MG Cap      2. Acute infective otitis externa, right  ciprofloxacin/dexamethasone (CIPRODEX) 0.3-0.1 % Suspension        Patient will be treated with clindamycin for the sores on her arm.  She already endorses that she is using Hibiclens for cleaning.  She is advised to continue that.  If she is nauseous from the clindamycin she is to take her ondansetron as directed.  She will also be treated with Ciprodex for her otitis externa as this is what has been used in the past with no reaction.  She is to follow-up with either 1 of these symptoms worsen, or fail to improve.     Assessment & Plan  Skin infection         Acute infective otitis externa, right       30 minutes was spent on this patient encounter to include review of previous encounters, pertinent historical labs and imaging, actual visit including discussion on treatment plan as well as follow up precautions/ED precautions.

## 2025-01-23 ENCOUNTER — OFFICE VISIT (OUTPATIENT)
Dept: CARDIOLOGY | Facility: MEDICAL CENTER | Age: 35
End: 2025-01-23
Attending: NURSE PRACTITIONER
Payer: MEDICARE

## 2025-01-23 VITALS
BODY MASS INDEX: 45.72 KG/M2 | SYSTOLIC BLOOD PRESSURE: 122 MMHG | HEIGHT: 64 IN | RESPIRATION RATE: 20 BRPM | WEIGHT: 267.8 LBS | DIASTOLIC BLOOD PRESSURE: 86 MMHG | OXYGEN SATURATION: 96 % | HEART RATE: 87 BPM

## 2025-01-23 DIAGNOSIS — R07.89 OTHER CHEST PAIN: ICD-10-CM

## 2025-01-23 DIAGNOSIS — I47.11 INAPPROPRIATE SINUS TACHYCARDIA (HCC): ICD-10-CM

## 2025-01-23 DIAGNOSIS — R00.2 PALPITATIONS: ICD-10-CM

## 2025-01-23 LAB — EKG IMPRESSION: NORMAL

## 2025-01-23 PROCEDURE — 3079F DIAST BP 80-89 MM HG: CPT | Performed by: NURSE PRACTITIONER

## 2025-01-23 PROCEDURE — 99213 OFFICE O/P EST LOW 20 MIN: CPT | Performed by: NURSE PRACTITIONER

## 2025-01-23 PROCEDURE — 93005 ELECTROCARDIOGRAM TRACING: CPT | Mod: TC | Performed by: NURSE PRACTITIONER

## 2025-01-23 PROCEDURE — 93010 ELECTROCARDIOGRAM REPORT: CPT | Performed by: INTERNAL MEDICINE

## 2025-01-23 PROCEDURE — 3074F SYST BP LT 130 MM HG: CPT | Performed by: NURSE PRACTITIONER

## 2025-01-23 ASSESSMENT — FIBROSIS 4 INDEX: FIB4 SCORE: 0.73

## 2025-01-23 NOTE — PATIENT INSTRUCTIONS
when you stop the metoprolol before the test     Take corlanor tab and a half twice a day    Take amlodipine twice a day before test

## 2025-01-24 ASSESSMENT — ENCOUNTER SYMPTOMS
PALPITATIONS: 1
DIZZINESS: 1

## 2025-01-25 PROCEDURE — RXMED WILLOW AMBULATORY MEDICATION CHARGE: Performed by: SPECIALIST

## 2025-01-25 NOTE — PROGRESS NOTES
Electrophysiology (EP) Follow up  Note    DOS: 1/23/2025  [id number]  Kristin Balderrama    Chief complaint/Reason for consult: ED chest pain     HPI: Pt is a 34 y.o. female who presents to the clinic today in follow up for chest pain and ED. Patient has a past medical history significant for but not limited to: dissociative identity disorder, Annetta-Danlos syndrome, paroxysmal atrial tachycardia, h/o sinus node modification ablation 2016, polycystic ovary syndrome, obesity, TONYA, POTS. Patient has been frequenting the ED for chest pain. She was scheduled to have a stress test but her beta blocker and ivabradine were stopped and she was having chest pain likely secondary to tachycardia and hypertension. There is no reason she needs to stop her ivabradine prior to stress test. No indication of any coronary disease on EKG. Stop beta blocker 2 days before test, increase corlanor 1.5 tablets twice a day and take amlodipine twice daily over the same two days to stabilize heart rate and blood pressure. Follow up with general cardiology after for longitudinal follow up.       Past Medical History:   Diagnosis Date    Abdominal pain     Anginal syndrome     Random chest pain especially with tachycardia    Apnea, sleep     Arthritis     ASTHMA     when around smoke    Back pain     Borderline personality disorder (HCC)     Chickenpox     Chronic UTI 09/18/2010    Daytime sleepiness     Dental disorder 03/08/2021    Infected tooth    Depression     Diabetes (HCC)     Diarrhea     Incontinece    Disorder of thyroid     Hashimoto's    Fatigue     Frequent headaches     Heart burn     History of falling     Inappropriate sinus tachycardia (HCC) 2016    Statusp post ablation by Dr. Kumar.    Migraine     Mitochondrial disease (HCC)     Multiple personality disorder (HCC)     Obesity     Pain     Back, 7/10    Painful joint     PCOS (polycystic ovarian syndrome)     Pneumonia 2012 12/2020    POTS (postural orthostatic tachycardia  "syndrome)     Psychosis (HCC)     Ringing in ears     Scoliosis     Shortness of breath     O2 as needed    Sinus tachycardia 10/31/2013    Sleep apnea     CPAP \"pulmonary doctor took me off mid year 2016\"    Snoring     Supraventricular tachycardia (HCC) 04/10/2019    Transverse myelitis (HCC)     2/8/22: Per pt: not anymore    Tuberculosis     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    Urinary bladder disorder     Suprapubic cath. 2/8/22: Not anymore.     Urinary incontinence     Weakness     Wears glasses        Past Surgical History:   Procedure Laterality Date    AL CYSTOSCOPY,INSERT URETERAL STENT Right 2/12/2024    Procedure: CYSTOSCOPY, WITH RIGHT URETEROSCOPY, WITH LITHOTRIPSY, WITH INSERTION OF RIGHT URETERAL STENT;  Surgeon: Josafat Roberson M.D.;  Location: [facility];  Service: Urology    AL CYSTO/URETERO/PYELOSCOPY, DX Right 2/12/2024    Procedure: URETEROSCOPY;  Surgeon: Josafat Roberson M.D.;  Location: [facility];  Service: Urology    LASERTRIPSY Right 2/12/2024    Procedure: LITHOTRIPSY, USING LASER;  Surgeon: Josafat Roberson M.D.;  Location: [facility];  Service: Urology    INGUINAL HERNIA LAPAROSCOPIC Right 07/21/2023    Procedure: LAPAROSCOPIC RIGHT INGUINAL HERNIA REPAIR WITH MESH;  Surgeon: Joe Noyola M.D.;  Location: [facility];  Service: General    HERNIA REPAIR Right 07/21/2023    PB PERCUT FIX PBOX/NECK FEMUR FX Left 01/28/2023    Procedure: FIXATION, HIP, USING CANNULATED SCREW;  Surgeon: Noman Abdul M.D.;  Location: SURGERY [facility];  Service: Orthopedics    AL LAP,DIAGNOSTIC ABDOMEN  02/14/2022    Procedure: LAPAROSCOPY;  Surgeon: Seamus Pisano M.D.;  Location: SURGERY SAME DAY [facility];  Service: Gynecology    OVARIAN CYSTECTOMY Right 02/14/2022    Procedure: EXCISION, CYST, OVARY;  Surgeon: Seamus Pisano M.D.;  Location: SURGERY SAME DAY [facility];  Service: Gynecology    BOWEL STIMULATOR INSERTION  03/10/2021    Procedure: " INSERTION, ELECTRODE LEADS AND PULSE GENERATOR, NEUROSTIMULATOR, SACRAL - REMOVAL OF INTERSTIM WITH REPLACEMENT OF SACRAL NEUROMODULATION DEVICE;  Surgeon: Joe Noyola M.D.;  Location: SURGERY [facility];  Service: General    MUSCLE BIOPSY Right 01/26/2017    Procedure: MUSCLE BIOPSY - THIGH;  Surgeon: Isidro Vigil M.D.;  Location: SURGERY [facility];  Service:     GASTROSCOPY WITH BALLOON DILATATION N/A 01/04/2017    Procedure: GASTROSCOPY WITH DILATATION;  Surgeon: Torres Vargas M.D.;  Location: SURGERY [facility];  Service:     BOWEL STIMULATOR INSERTION  07/13/2016    Procedure: BOWEL STIMULATOR INSERTION FOR PERMANENT INTERSTIM SACRAL IMPLANT;  Surgeon: Joe Noyola M.D.;  Location: SURGERY [facility];  Service:     RECOVERY  01/27/2016    Procedure: CATH LAB EP STUDY WITH SINUS NODE MODIFICATION LA;  Surgeon: [facility];  Location: SURGERY PRE-POST PROC UNIT [facility];  Service:     OTHER CARDIAC SURGERY  01/2016    cardiac ablation    NEURO DEST FACET L/S W/IG SNGL  04/21/2015    Performed by Reza Tabor at SURGERY [facility]    LUMBAR FUSION ANTERIOR  08/21/2012    Performed by SUSIE SAWANT at SURGERY [facility]    ALYSSA BY LAPAROSCOPY  08/29/2010    Performed by SHAYY JOHNS at SURGERY [facility]    LAMINOTOMY      OTHER ABDOMINAL SURGERY      TONSILLECTOMY      tonsillectomy       Social History     Socioeconomic History    Marital status: Single     Spouse name: Not on file    Number of children: Not on file    Years of education: Not on file    Highest education level: Not on file   Occupational History    Not on file   Tobacco Use    Smoking status: Never    Smokeless tobacco: Never   Vaping Use    Vaping status: Never Used   Substance and Sexual Activity    Alcohol use: No     Alcohol/week: 0.0 oz    Drug use: Never    Sexual activity: Not Currently     Birth control/protection: Implant   Other Topics Concern    Not on file   Social History  Narrative    ** Merged History Encounter **          Social Drivers of Health     Financial Resource Strain: Medium Risk (4/30/2021)    Overall Financial Resource Strain (CARDIA)     Difficulty of Paying Living Expenses: Somewhat hard   Food Insecurity: No Food Insecurity (10/3/2024)    Hunger Vital Sign     Worried About Running Out of Food in the Last Year: Never true     Ran Out of Food in the Last Year: Never true   Transportation Needs: No Transportation Needs (10/3/2024)    PRAPARE - Transportation     Lack of Transportation (Medical): No     Lack of Transportation (Non-Medical): No   Physical Activity: Not on file   Stress: Not on file   Social Connections: Not on file   Intimate Partner Violence: Not At Risk (10/3/2024)    Humiliation, Afraid, Rape, and Kick questionnaire     Fear of Current or Ex-Partner: No     Emotionally Abused: No     Physically Abused: No     Sexually Abused: No   Housing Stability: Low Risk  (10/3/2024)    Housing Stability Vital Sign     Unable to Pay for Housing in the Last Year: No     Number of Times Moved in the Last Year: 0     Homeless in the Last Year: No       Family History   Problem Relation Age of Onset    Hypertension Mother     Sleep Apnea Mother     Heart Disease Mother     Other Mother         hypothryod    No Known Problems Sister     Other Sister         Narcolepsy;fibromyalsia;bone;nerve    Genitourinary () Problems Sister         endometriosis    Hypertension Maternal Uncle     Heart Disease Maternal Grandmother     Hypertension Maternal Grandmother     Cancer Neg Hx        Allergies   Allergen Reactions    Cefdinir Shortness of Breath and Itching     Tolerated 1/18/17  Tolerates ceftriaxone  Tolerated augmentin 8/2019     Depakote [Divalproex Sodium] Unspecified     Muscle spasms/muscle pain and weakness      Doxycycline Anaphylaxis and Vomiting     Other reaction(s): pustules/blisters  Other reaction(s): stomach pain    Montelukast [Singulair] Unspecified      "Cardiac effusion    Vancomycin Itching     Pt becomes flushed in face and chest.   RXN=7/10/16    Wound Dressing Adhesive Rash     By pt report-\"removes skin totally off\"    Amitriptyline Unspecified     Headaches      Amoxicillin Rash      Tolerates augmentin    Aripiprazole [Abilify] Unspecified     Headaches/muscle twitching      Clindamycin Nausea         Other reaction(s): nausea stomach pain    Erythromycin Rash     .  Other reaction(s): nausea stomach pain    Flomax [Tamsulosin Hydrochloride] Swelling    Hydromorphone      Other reaction(s): vomiting    Levaquin Unspecified     Severe muscle cramps in legs  RXN=unknown  Other reaction(s): leg muscle cramps    Metformin Unspecified     Increased lactic acid     Other reaction(s): itching and rash/nausea vomiting    Sulfa Drugs Hives, Itching, Myalgia and Unspecified     Muscle pain and weakness    Other reaction(s): unknown    Tamsulosin Swelling     Swelling of legs    Tape Rash     Tears skin off  coban with Tegaderm tape ok intermittently  RXN=ongoing    Sulfamethoxazole W-Trimethoprim Rash    Ciprofloxacin Rash          Keflex Rash     Pt states she gets a rash with this medication  Tolerates ceftriaxone  Can take with Benadryl    Levofloxacin Unspecified     Leg muscle cramps    Metronidazole Rash     \"Vision problems\"  Other reaction(s): vision problems       Current Outpatient Medications   Medication Sig Dispense Refill    ciprofloxacin/dexamethasone (CIPRODEX) 0.3-0.1 % Suspension Administer 4 Drops into affected ear(s) 2 times a day for 7 days. 7.5 mL 0    clindamycin (CLEOCIN) 300 MG Cap Take 1 Capsule by mouth 3 times a day for 7 days. 21 Capsule 0    gabapentin (NEURONTIN) 400 MG Cap TAKE 3 CAPSULES BY MOUTH THREE TIMES DAILY. 270 Capsule 0    naproxen (NAPROSYN) 500 MG Tab Take 1 Tablet by mouth Twice A Day 60 Tablet 0    tizanidine (ZANAFLEX) 4 MG Tab Take 1 Tablet by mouth Three Times A Day as needed 90 Tablet 0    omeprazole (PRILOSEC) 20 MG " delayed-release capsule Take 2 Capsules by mouth 2 times a day. 180 Capsule 3    loperamide (IMODIUM) 2 MG Cap TAKE 1 CAPSULE BY MOUTH FOUR TIMES A DAY AS NEEDED FOR DIARRHEA      ondansetron (ZOFRAN ODT) 4 MG TABLET DISPERSIBLE DISSOLVE 1 TABLET ON THE TONGUE EVERY 8 HOURS FOR 5 DAYS AS NEEDED FOR NAUSEA/VOMITING      topiramate (TOPAMAX) 50 MG tablet Take 1 Tablet by mouth 2 times a day. 180 Tablet 4    prochlorperazine (COMPAZINE) 10 MG Tab Take 1 Tablet by mouth every 6 hours as needed for Nausea/Vomiting. 30 Tablet 0    tizanidine (ZANAFLEX) 4 MG capsule take 1 Tablet Three Times A Day as needed Qty 90  Dx.M54.16 90 Capsule 0    scopolamine (TRANSDERM SCOP, 1.5 MG,) 1 mg/72hr PATCH 72 HR Place 1 Patch on the skin every 72 hours. 7 Patch 1    ipratropium-albuterol (COMBIVENT RESPIMAT)  MCG/ACT Aero Soln Inhale 2 Puffs 4 times a day as needed (shortness of breath, wheezing). 4 g 1    albuterol (PROVENTIL) 2.5mg/3ml Nebu Soln solution for nebulization Take 3 mL by nebulization every four hours as needed for Shortness of Breath. 75 mL 0    ziprasidone (GEODON) 40 MG Cap Take 1 capsule by mouth at bedtime. 90 Capsule 1    amLODIPine (NORVASC) 5 MG Tab Take 1 tablet by mouth every day. 90 Tablet 1    metoprolol SR (TOPROL XL) 25 MG TABLET SR 24 HR Take 1 Tablet by mouth 2 times a day. 200 Tablet 3    Galcanezumab-gnlm (EMGALITY) 120 MG/ML Solution Auto-injector Inject 1 mL subcutaneously every month. 1 mL 11    lamoTRIgine (LAMICTAL) 25 MG Tab Take 2 Tablets by mouth 2 times a day. 360 Tablet 1    Rimegepant Sulfate (NURTEC) 75 MG TABLET DISPERSIBLE Take 1 tablet by mouth at onset of migraine or aura okay to repeat in 24 hours if needed. 8 Tablet 5    sumatriptan (IMITREX) 20 MG/ACT nasal spray Give 1 dose at onset headache okay to repeat in 2 hours if needed for max of 2 doses in a 24 hour timeframe. 6 Each 5    ivabradine (CORLANOR) 7.5 MG Tab tablet Take 1 Tablet by mouth 2 times a day with meals. 60 Tablet  3    diphenhydrAMINE (BENADRYL) 25 MG Tab Take 25-50 mg by mouth 2 times a day. Scheduled    25 mg = AM  50 MG = PM      Melatonin 10 MG Tab Take 10 mg by mouth at bedtime.      spironolactone (ALDACTONE) 25 MG Tab Take 25 mg by mouth every day. (Patient taking differently: Take 50 mg by mouth every day.)       No current facility-administered medications for this visit.       Vitals:    01/23/25 1457   BP: 122/86   Pulse: 87   Resp: 20   SpO2: 96%         Review of Systems   Constitutional:  Positive for malaise/fatigue.   Cardiovascular:  Positive for chest pain and palpitations.   Neurological:  Positive for dizziness.            EKG interpreted by me: Sinus    Physical Exam  Constitutional:       Appearance: Normal appearance.   HENT:      Head: Normocephalic.   Eyes:      Pupils: Pupils are equal, round, and reactive to light.   Neck:      Vascular: No JVD.   Cardiovascular:      Rate and Rhythm: Normal rate and regular rhythm.      Pulses: Normal pulses.      Heart sounds: Normal heart sounds.   Pulmonary:      Effort: Pulmonary effort is normal.      Breath sounds: Normal breath sounds.   Abdominal:      General: Abdomen is flat.      Palpations: Abdomen is soft.   Musculoskeletal:      Cervical back: Normal range of motion.      Right lower leg: No edema.      Left lower leg: No edema.   Skin:     General: Skin is warm and dry.   Neurological:      Mental Status: She is alert and oriented to person, place, and time.   Psychiatric:         Mood and Affect: Mood normal.         Behavior: Behavior normal.          Data:  Lipids:   Lab Results   Component Value Date/Time    CHOLSTRLTOT 198 09/30/2024 07:14 AM    TRIGLYCERIDE 175 (H) 09/30/2024 07:14 AM    HDL 44 09/30/2024 07:14 AM     (H) 09/30/2024 07:14 AM        BMP:  Lab Results   Component Value Date/Time    SODIUM 141 01/22/2025 1710    POTASSIUM 4.0 01/22/2025 1710    CHLORIDE 111 (H) 01/22/2025 1710    CO2 19.0 (L) 01/22/2025 1710    GLUCOSE 143  "(H) 01/22/2025 1710    BUN 16.0 01/22/2025 1710    CREATININE 0.80 01/22/2025 1710    CALCIUM 9.8 01/22/2025 1710    ANION 11.0 01/22/2025 1710       GFR:  Lab Results   Component Value Date/Time    IFAFRICA >60 02/21/2022 2041    IFNOTAFR >60 02/21/2022 2041        TSH:   Lab Results   Component Value Date/Time    TSHULTRASEN 3.140 01/12/2025 1909       MAGNESIUM:  Lab Results   Component Value Date/Time    MAGNESIUM 2.0 01/12/2025 1909    MAGNESIUM 1.9 07/17/2024 1913    MAGNESIUM 1.8 03/15/2024 0041        THYROXINE (T4):   No results found for: \"FREEDIR\"     CBC:   Lab Results   Component Value Date/Time    WBC 5.50 01/22/2025 05:10 PM    WBC 5.3 01/21/2025 03:13 PM    WBC 6.1 07/20/2010 11:00 AM    RBC 4.44 01/22/2025 05:10 PM    RBC 4.66 01/21/2025 03:13 PM    RBC 4.38 07/20/2010 11:00 AM    HEMOGLOBIN 14.0 01/22/2025 05:10 PM    HEMOGLOBIN 14.8 01/21/2025 03:13 PM    HEMATOCRIT 41.4 01/22/2025 05:10 PM    HEMATOCRIT 43.0 01/21/2025 03:13 PM    MCV 93.3 01/22/2025 05:10 PM    MCV 92.3 01/21/2025 03:13 PM    MCV 93 07/20/2010 11:00 AM    MCH 31.6 01/22/2025 05:10 PM    MCH 31.8 01/21/2025 03:13 PM    MCH 30.1 07/20/2010 11:00 AM    MCHC 33.8 01/22/2025 05:10 PM    MCHC 34.4 01/21/2025 03:13 PM    RDW 13.3 01/22/2025 05:10 PM    RDW 41.1 01/21/2025 03:13 PM    RDW 13.5 07/20/2010 11:00 AM    PLATELETCT 199 01/22/2025 05:10 PM    PLATELETCT 185 01/21/2025 03:13 PM    MPV 10.0 01/22/2025 05:10 PM    MPV 11.3 01/21/2025 03:13 PM    NEUTSPOLYS 47.4 01/22/2025 05:10 PM    NEUTSPOLYS 52.60 01/21/2025 03:13 PM    LYMPHOCYTES 33.4 01/22/2025 05:10 PM    LYMPHOCYTES 30.90 01/21/2025 03:13 PM    MONOCYTES 10.4 (H) 01/22/2025 05:10 PM    MONOCYTES 8.80 01/21/2025 03:13 PM    EOSINOPHILS 7.5 (H) 01/22/2025 05:10 PM    EOSINOPHILS 6.40 01/21/2025 03:13 PM    BASOPHILS 1.3 (H) 01/22/2025 05:10 PM    BASOPHILS 0.70 01/21/2025 03:13 PM    IMMGRAN 0.60 01/21/2025 03:13 PM    IMMGRAN 0 07/20/2010 11:00 AM    NRBC 0.00 01/21/2025 " 03:13 PM    NEUTS 2.6 01/22/2025 05:10 PM    NEUTS 2.81 01/21/2025 03:13 PM    NEUTS 3.3 07/20/2010 11:00 AM    LYMPHS 1.8 01/22/2025 05:10 PM    LYMPHS 1.65 01/21/2025 03:13 PM    LYMPHS 63 01/20/2021 11:35 AM    MONOS 0.6 01/22/2025 05:10 PM    MONOS 0.47 01/21/2025 03:13 PM    MONOS 0.3 07/20/2010 11:00 AM    EOS 0.4 01/22/2025 05:10 PM    EOS 0.34 01/21/2025 03:13 PM    EOS 0.1 07/20/2010 11:00 AM    BASO 0.1 01/22/2025 05:10 PM    BASO 0.04 01/21/2025 03:13 PM    BASO 0.0 07/20/2010 11:00 AM    IMMGRANAB 0.03 01/21/2025 03:13 PM    IMMGRANAB 0.0 07/20/2010 11:00 AM    NRBCAB 0.00 01/21/2025 03:13 PM        CBC w/o DIFF  Lab Results   Component Value Date/Time    WBC 5.50 01/22/2025 05:10 PM    WBC 5.3 01/21/2025 03:13 PM    WBC 6.1 07/20/2010 11:00 AM    RBC 4.44 01/22/2025 05:10 PM    RBC 4.66 01/21/2025 03:13 PM    RBC 4.38 07/20/2010 11:00 AM    HEMOGLOBIN 14.0 01/22/2025 05:10 PM    HEMOGLOBIN 14.8 01/21/2025 03:13 PM    MCV 93.3 01/22/2025 05:10 PM    MCV 92.3 01/21/2025 03:13 PM    MCV 93 07/20/2010 11:00 AM    MCH 31.6 01/22/2025 05:10 PM    MCH 31.8 01/21/2025 03:13 PM    MCH 30.1 07/20/2010 11:00 AM    MCHC 33.8 01/22/2025 05:10 PM    MCHC 34.4 01/21/2025 03:13 PM    RDW 13.3 01/22/2025 05:10 PM    RDW 41.1 01/21/2025 03:13 PM    RDW 13.5 07/20/2010 11:00 AM    MPV 10.0 01/22/2025 05:10 PM    MPV 11.3 01/21/2025 03:13 PM       LIVER:  Lab Results   Component Value Date/Time    ALKPHOSPHAT 76 01/21/2025 03:13 PM    ASTSGOT 26 01/21/2025 03:13 PM    ALTSGPT 39 01/21/2025 03:13 PM    TBILIRUBIN 0.4 01/21/2025 03:13 PM       BNP:  Lab Results   Component Value Date/Time    BNPBTYPENAT 22 01/25/2019 11:09 AM       PT/INR:  Lab Results   Component Value Date/Time    PROTHROMBTM 13.1 09/08/2024 05:10 PM    PROTHROMBTM 12.9 08/15/2024 02:42 PM    PROTHROMBTM 13.0 02/05/2024 01:28 PM    INR 0.98 09/08/2024 05:10 PM    INR 0.96 08/15/2024 02:42 PM    INR 0.97 02/05/2024 01:28 PM              Impression/Plan:  Inappropriate sinus tachycardia (HCC)   - continue corlanor an metoprolol    - for stress test:   when you stop the metoprolol before the test     Take corlanor tab and a half twice a day    Take amlodipine twice a day before test              A total of 20 minutes of time was spent on day of encounter reviewing medical record, performing history and examination, counseling, ordering medication/test/consults, collaborating with referring service, and documentation.    Reinier Díaz AGACNP-EP  Cardiac Electrophysiology

## 2025-01-25 NOTE — ASSESSMENT & PLAN NOTE
- continue corlanor an metoprolol    - for stress test:   when you stop the metoprolol before the test     Take corlanor tab and a half twice a day    Take amlodipine twice a day before test

## 2025-01-28 ENCOUNTER — PHARMACY VISIT (OUTPATIENT)
Dept: PHARMACY | Facility: MEDICAL CENTER | Age: 35
End: 2025-01-28
Payer: COMMERCIAL

## 2025-01-28 ENCOUNTER — OFFICE VISIT (OUTPATIENT)
Dept: URGENT CARE | Facility: CLINIC | Age: 35
End: 2025-01-28
Payer: MEDICARE

## 2025-01-28 VITALS
SYSTOLIC BLOOD PRESSURE: 118 MMHG | WEIGHT: 269.8 LBS | RESPIRATION RATE: 18 BRPM | TEMPERATURE: 97.7 F | HEIGHT: 64 IN | DIASTOLIC BLOOD PRESSURE: 74 MMHG | OXYGEN SATURATION: 97 % | HEART RATE: 72 BPM | BODY MASS INDEX: 46.06 KG/M2

## 2025-01-28 DIAGNOSIS — R42 VERTIGO: ICD-10-CM

## 2025-01-28 DIAGNOSIS — H65.01 RIGHT ACUTE SEROUS OTITIS MEDIA, RECURRENCE NOT SPECIFIED: ICD-10-CM

## 2025-01-28 DIAGNOSIS — H60.503 ACUTE OTITIS EXTERNA OF BOTH EARS, UNSPECIFIED TYPE: ICD-10-CM

## 2025-01-28 PROCEDURE — RXMED WILLOW AMBULATORY MEDICATION CHARGE: Performed by: PHYSICIAN ASSISTANT

## 2025-01-28 PROCEDURE — 99214 OFFICE O/P EST MOD 30 MIN: CPT | Performed by: PHYSICIAN ASSISTANT

## 2025-01-28 PROCEDURE — 3078F DIAST BP <80 MM HG: CPT | Performed by: PHYSICIAN ASSISTANT

## 2025-01-28 PROCEDURE — 3074F SYST BP LT 130 MM HG: CPT | Performed by: PHYSICIAN ASSISTANT

## 2025-01-28 RX ORDER — MECLIZINE HYDROCHLORIDE 25 MG/1
25 TABLET ORAL 3 TIMES DAILY PRN
Qty: 30 TABLET | Refills: 0 | Status: SHIPPED | OUTPATIENT
Start: 2025-01-28

## 2025-01-28 ASSESSMENT — ENCOUNTER SYMPTOMS
SORE THROAT: 0
SINUS PAIN: 0
HEADACHES: 0
FOCAL WEAKNESS: 0
WHEEZING: 0
CHILLS: 0
DIARRHEA: 0
DOUBLE VISION: 0
TINGLING: 0
NAUSEA: 0
MYALGIAS: 0
SENSORY CHANGE: 0
COUGH: 0
SHORTNESS OF BREATH: 0
WEAKNESS: 0
ABDOMINAL PAIN: 0
FEVER: 0
VOMITING: 0
DIZZINESS: 1
BLURRED VISION: 0

## 2025-01-28 ASSESSMENT — FIBROSIS 4 INDEX: FIB4 SCORE: 0.73

## 2025-01-28 NOTE — PROGRESS NOTES
"Subjective     Kristin Balderrama is a 34 y.o. female who presents with Otalgia (X1 week with ear pain and came into UC patient states is now having dizziness, and pain going down into neck. )            Patient was seen last week for otitis externa of right ear and skin infection in both armpits. She reports her right ear pain has worsened over the past few days. She also reports vertigo with head movement and sleep position. She describes the room as spinning and is having balance issues. Right ear pain is severe and radiates down right neck. She has had no fever or chills. She has been using Cipro-dex drops with minimal relief.       Past Medical History:   Diagnosis Date    Abdominal pain     Anginal syndrome     Random chest pain especially with tachycardia    Apnea, sleep     Arthritis     ASTHMA     when around smoke    Back pain     Borderline personality disorder (HCC)     Chickenpox     Chronic UTI 09/18/2010    Daytime sleepiness     Dental disorder 03/08/2021    Infected tooth    Depression     Diabetes (HCC)     Diarrhea     Incontinece    Disorder of thyroid     Hashimoto's    Fatigue     Frequent headaches     Heart burn     History of falling     Inappropriate sinus tachycardia (HCC) 2016    Statusp post ablation by Dr. Kumar.    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    Urinary bladder disorder     Suprapubic " cath. 2/8/22: Not anymore.     Urinary incontinence     Weakness     Wears glasses        Past Surgical History:   Procedure Laterality Date    NV CYSTOSCOPY,INSERT URETERAL STENT Right 2/12/2024    Procedure: CYSTOSCOPY, WITH RIGHT URETEROSCOPY, WITH LITHOTRIPSY, WITH INSERTION OF RIGHT URETERAL STENT;  Surgeon: Josafat Roberson M.D.;  Location: [facility];  Service: Urology    NV CYSTO/URETERO/PYELOSCOPY, DX Right 2/12/2024    Procedure: URETEROSCOPY;  Surgeon: Josafat Roberson M.D.;  Location: [facility];  Service: Urology    LASERTRIPSY Right 2/12/2024    Procedure: LITHOTRIPSY, USING LASER;  Surgeon: Josafat Roberson M.D.;  Location: [facility];  Service: Urology    INGUINAL HERNIA LAPAROSCOPIC Right 07/21/2023    Procedure: LAPAROSCOPIC RIGHT INGUINAL HERNIA REPAIR WITH MESH;  Surgeon: Joe Noyola M.D.;  Location: [facility];  Service: General    HERNIA REPAIR Right 07/21/2023    PB PERCUT FIX PBOX/NECK FEMUR FX Left 01/28/2023    Procedure: FIXATION, HIP, USING CANNULATED SCREW;  Surgeon: Noman Abdul M.D.;  Location: [facility];  Service: Orthopedics    NV LAP,DIAGNOSTIC ABDOMEN  02/14/2022    Procedure: LAPAROSCOPY;  Surgeon: Seamus Pisano M.D.;  Location: SURGERY SAME DAY [facility];  Service: Gynecology    OVARIAN CYSTECTOMY Right 02/14/2022    Procedure: EXCISION, CYST, OVARY;  Surgeon: Seamus Pisano M.D.;  Location: SURGERY SAME DAY [facility];  Service: Gynecology    BOWEL STIMULATOR INSERTION  03/10/2021    Procedure: INSERTION, ELECTRODE LEADS AND PULSE GENERATOR, NEUROSTIMULATOR, SACRAL - REMOVAL OF INTERSTIM WITH REPLACEMENT OF SACRAL NEUROMODULATION DEVICE;  Surgeon: Joe Noyola M.D.;  Location: [facility];  Service: General    MUSCLE BIOPSY Right 01/26/2017    Procedure: MUSCLE BIOPSY - THIGH;  Surgeon: Isidro Vigil M.D.;  Location: [facility];  Service:     GASTROSCOPY WITH BALLOON DILATATION N/A 01/04/2017     Procedure: GASTROSCOPY WITH DILATATION;  Surgeon: Torres Vargas M.D.;  Location: SURGERY [facility];  Service:     BOWEL STIMULATOR INSERTION  07/13/2016    Procedure: BOWEL STIMULATOR INSERTION FOR PERMANENT INTERSTIM SACRAL IMPLANT;  Surgeon: Joe Noyola M.D.;  Location: SURGERY [facility];  Service:     RECOVERY  01/27/2016    Procedure: CATH LAB EP STUDY WITH SINUS NODE MODIFICATION ABHINAV;  Surgeon: [facility];  Location: SURGERY PRE-POST PROC UNIT [facility];  Service:     OTHER CARDIAC SURGERY  01/2016    cardiac ablation    NEURO DEST FACET L/S W/IG SNGL  04/21/2015    Performed by Reza Tabor at SURGERY [facility]    LUMBAR FUSION ANTERIOR  08/21/2012    Performed by SUSIE SAWANT at SURGERY [facility] ORS    ALYSSA BY LAPAROSCOPY  08/29/2010    Performed by SHAYY JOHNS at SURGERY [facility]    LAMINOTOMY      OTHER ABDOMINAL SURGERY      TONSILLECTOMY      tonsillectomy       Family History   Problem Relation Age of Onset    Hypertension Mother     Sleep Apnea Mother     Heart Disease Mother     Other Mother         hypothryod    No Known Problems Sister     Other Sister         Narcolepsy;fibromyalsia;bone;nerve    Genitourinary () Problems Sister         endometriosis    Hypertension Maternal Uncle     Heart Disease Maternal Grandmother     Hypertension Maternal Grandmother     Cancer Neg Hx      Allergies:  Cefdinir, Depakote [divalproex sodium], Doxycycline, Montelukast [singulair], Vancomycin, Wound dressing adhesive, Amitriptyline, Amoxicillin, Aripiprazole [abilify], Clindamycin, Erythromycin, Flomax [tamsulosin hydrochloride], Hydromorphone, Levaquin, Metformin, Sulfa drugs, Tamsulosin, Tape, Sulfamethoxazole w-trimethoprim, Ciprofloxacin, Keflex, Levofloxacin, and Metronidazole    Medications, Allergies, and current problem list reviewed today in Epic      Review of Systems   Constitutional:  Negative for chills, fever and malaise/fatigue.   HENT:  Positive  "for ear pain (right ear). Negative for congestion, sinus pain and sore throat.    Eyes:  Negative for blurred vision and double vision.   Respiratory:  Negative for cough, shortness of breath and wheezing.    Cardiovascular:  Negative for chest pain.   Gastrointestinal:  Negative for abdominal pain, diarrhea, nausea and vomiting.   Musculoskeletal:  Negative for myalgias.   Neurological:  Positive for dizziness. Negative for tingling, sensory change, focal weakness, weakness and headaches.     All other systems reviewed and are negative.            Objective     /74   Pulse 72   Temp 36.5 °C (97.7 °F) (Temporal)   Resp 18   Ht 1.626 m (5' 4\")   Wt 122 kg (269 lb 12.8 oz)   SpO2 97%   BMI 46.31 kg/m²      Physical Exam  Constitutional:       General: She is not in acute distress.     Appearance: Normal appearance. She is not ill-appearing.   HENT:      Head: Normocephalic and atraumatic.      Right Ear: External ear normal. Swelling present. No drainage. A middle ear effusion is present. Tympanic membrane is injected.      Left Ear: External ear normal. Swelling present. No drainage. Tympanic membrane is not injected.      Ears:      Comments: Bilateral ear canal edema.  Eyes:      Conjunctiva/sclera: Conjunctivae normal.   Cardiovascular:      Rate and Rhythm: Normal rate and regular rhythm.   Pulmonary:      Effort: Pulmonary effort is normal. No respiratory distress.      Breath sounds: No stridor. No wheezing.   Skin:     General: Skin is warm and dry.   Neurological:      General: No focal deficit present.      Mental Status: She is alert and oriented to person, place, and time.      Cranial Nerves: No cranial nerve deficit.      Sensory: No sensory deficit.      Motor: No weakness.   Psychiatric:         Mood and Affect: Mood normal.         Behavior: Behavior normal.         Thought Content: Thought content normal.         Judgment: Judgment normal.                             Assessment & Plan "        Assessment & Plan  Right acute serous otitis media, recurrence not specified    Orders:    amoxicillin-clavulanate (AUGMENTIN) 875-125 MG Tab; Take 1 Tablet by mouth 2 times a day for 7 days.  stop Clindamycin  Acute otitis externa of both ears, unspecified type       Continue Ciprodex  Vertigo    Orders:    meclizine (ANTIVERT) 25 MG Tab; Take 1 Tablet by mouth 3 times a day as needed for Vertigo.     Differential diagnoses, Supportive care, and indications for immediate follow-up discussed with patient.   Pathogenesis of diagnosis discussed including typical length and natural progression.   Instructed to return to clinic or nearest emergency department for any change in condition, further concerns, or worsening of symptoms.    The patient demonstrated a good understanding and agreed with the treatment plan.    Ruth Ann Ramesh P.A.-C.

## 2025-01-30 DIAGNOSIS — I47.11 INAPPROPRIATE SINUS TACHYCARDIA (HCC): ICD-10-CM

## 2025-01-30 PROCEDURE — RXMED WILLOW AMBULATORY MEDICATION CHARGE: Performed by: NURSE PRACTITIONER

## 2025-01-31 PROCEDURE — RXMED WILLOW AMBULATORY MEDICATION CHARGE: Performed by: STUDENT IN AN ORGANIZED HEALTH CARE EDUCATION/TRAINING PROGRAM

## 2025-01-31 RX ORDER — METOPROLOL SUCCINATE 25 MG/1
25 TABLET, EXTENDED RELEASE ORAL 2 TIMES DAILY
Qty: 180 TABLET | Refills: 1 | Status: SHIPPED | OUTPATIENT
Start: 2025-01-31

## 2025-02-04 ENCOUNTER — PHARMACY VISIT (OUTPATIENT)
Dept: PHARMACY | Facility: MEDICAL CENTER | Age: 35
End: 2025-02-04
Payer: COMMERCIAL

## 2025-02-05 ENCOUNTER — APPOINTMENT (OUTPATIENT)
Dept: RADIOLOGY | Facility: IMAGING CENTER | Age: 35
End: 2025-02-05
Attending: FAMILY MEDICINE
Payer: MEDICARE

## 2025-02-05 ENCOUNTER — OFFICE VISIT (OUTPATIENT)
Dept: URGENT CARE | Facility: CLINIC | Age: 35
End: 2025-02-05
Payer: MEDICARE

## 2025-02-05 VITALS
DIASTOLIC BLOOD PRESSURE: 74 MMHG | WEIGHT: 269 LBS | TEMPERATURE: 97.2 F | RESPIRATION RATE: 18 BRPM | HEIGHT: 64 IN | HEART RATE: 72 BPM | OXYGEN SATURATION: 97 % | BODY MASS INDEX: 45.93 KG/M2 | SYSTOLIC BLOOD PRESSURE: 136 MMHG

## 2025-02-05 DIAGNOSIS — M79.601 RIGHT ARM PAIN: ICD-10-CM

## 2025-02-05 PROCEDURE — 3078F DIAST BP <80 MM HG: CPT | Performed by: FAMILY MEDICINE

## 2025-02-05 PROCEDURE — RXMED WILLOW AMBULATORY MEDICATION CHARGE: Performed by: NURSE PRACTITIONER

## 2025-02-05 PROCEDURE — 73060 X-RAY EXAM OF HUMERUS: CPT | Mod: TC,RT | Performed by: RADIOLOGY

## 2025-02-05 PROCEDURE — 99213 OFFICE O/P EST LOW 20 MIN: CPT | Performed by: FAMILY MEDICINE

## 2025-02-05 PROCEDURE — 3075F SYST BP GE 130 - 139MM HG: CPT | Performed by: FAMILY MEDICINE

## 2025-02-05 ASSESSMENT — FIBROSIS 4 INDEX: FIB4 SCORE: 0.73

## 2025-02-05 NOTE — PROGRESS NOTES
"  Subjective:      34 y.o. female presents to urgent care for right arm pain that started on Sunday. She is just getting over vertigo, but had a fall from it on Sunday. She did not hit her head or lose consciousness.  She was evaluated in the [facility] emergency department that same day.  She reportedly had imaging of her hands and right knee that were within normal limits.  After she got home she noted she is also experiencing right upper arm pain, it has been constant since then, described as sharp and stabbing, currently rated 8/10.  She has been using naproxen and Tylenol with some relief in symptoms.  She is ambidextrous.    She denies any other questions or concerns at this time.    Current problem list, medication, and past medical/surgical history were reviewed in Epic.    ROS  See HPI     Objective:      /74   Pulse 72   Temp 36.2 °C (97.2 °F)   Resp 18   Ht 1.626 m (5' 4\")   Wt 122 kg (269 lb)   SpO2 97%   BMI 46.17 kg/m²     Physical Exam  Constitutional:       General: She is not in acute distress.     Appearance: She is not diaphoretic.   Cardiovascular:      Rate and Rhythm: Normal rate and regular rhythm.      Heart sounds: Normal heart sounds.   Pulmonary:      Effort: Pulmonary effort is normal. No respiratory distress.      Breath sounds: Normal breath sounds.   Musculoskeletal:      Comments: No discolorations or deformities noted to inspection of right upper extremity.  She is tender to palpation of her right humerus.   Neurological:      Mental Status: She is alert.      Comments: Equal strength and sensation to hands bilaterally.   Psychiatric:         Mood and Affect: Affect normal.         Judgment: Judgment normal.       Assessment/Plan:     1. Right arm pain  X-ray showing no acute osseous abnormalities.  She was encouraged to continue with Tylenol and ibuprofen as needed for symptomatic relief.  - DX-HUMERUS 2+ RIGHT; Future      Instructed to return to Urgent Care or " nearest Emergency Department if symptoms fail to improve, for any change in condition, further concerns, or new concerning symptoms. Patient states understanding of the plan of care and discharge instructions.    Lela Valencia M.D.

## 2025-02-06 ENCOUNTER — HOSPITAL ENCOUNTER (OUTPATIENT)
Dept: RADIOLOGY | Facility: MEDICAL CENTER | Age: 35
End: 2025-02-06
Attending: NURSE PRACTITIONER
Payer: MEDICARE

## 2025-02-06 DIAGNOSIS — R07.89 OTHER CHEST PAIN: ICD-10-CM

## 2025-02-06 PROCEDURE — A9502 TC99M TETROFOSMIN: HCPCS

## 2025-02-06 PROCEDURE — 700111 HCHG RX REV CODE 636 W/ 250 OVERRIDE (IP): Mod: UD

## 2025-02-06 RX ORDER — AMINOPHYLLINE 25 MG/ML
100 INJECTION, SOLUTION INTRAVENOUS
Status: DISCONTINUED | OUTPATIENT
Start: 2025-02-06 | End: 2025-02-07 | Stop reason: HOSPADM

## 2025-02-06 RX ORDER — REGADENOSON 0.08 MG/ML
0.4 INJECTION, SOLUTION INTRAVENOUS ONCE
Status: COMPLETED | OUTPATIENT
Start: 2025-02-06 | End: 2025-02-06

## 2025-02-06 RX ORDER — REGADENOSON 0.08 MG/ML
INJECTION, SOLUTION INTRAVENOUS
Status: COMPLETED
Start: 2025-02-06 | End: 2025-02-06

## 2025-02-06 RX ADMIN — REGADENOSON 0.4 MG: 0.08 INJECTION, SOLUTION INTRAVENOUS at 10:11

## 2025-02-07 ENCOUNTER — HOSPITAL ENCOUNTER (OUTPATIENT)
Dept: RADIOLOGY | Facility: MEDICAL CENTER | Age: 35
End: 2025-02-07
Payer: MEDICARE

## 2025-02-07 DIAGNOSIS — R74.8 ACID PHOSPHATASE ELEVATED: ICD-10-CM

## 2025-02-07 DIAGNOSIS — K62.5 HEMORRHAGE OF RECTUM AND ANUS: ICD-10-CM

## 2025-02-07 DIAGNOSIS — K76.0 FATTY METAMORPHOSIS OF LIVER: ICD-10-CM

## 2025-02-07 PROCEDURE — 76700 US EXAM ABDOM COMPLETE: CPT

## 2025-02-07 PROCEDURE — 78452 HT MUSCLE IMAGE SPECT MULT: CPT | Mod: 26 | Performed by: INTERNAL MEDICINE

## 2025-02-07 PROCEDURE — 93018 CV STRESS TEST I&R ONLY: CPT | Performed by: INTERNAL MEDICINE

## 2025-02-10 PROCEDURE — RXMED WILLOW AMBULATORY MEDICATION CHARGE: Performed by: NURSE PRACTITIONER

## 2025-02-11 ENCOUNTER — PHARMACY VISIT (OUTPATIENT)
Dept: PHARMACY | Facility: MEDICAL CENTER | Age: 35
End: 2025-02-11
Payer: COMMERCIAL

## 2025-02-11 ENCOUNTER — PATIENT MESSAGE (OUTPATIENT)
Dept: CARDIOLOGY | Facility: MEDICAL CENTER | Age: 35
End: 2025-02-11

## 2025-02-11 ENCOUNTER — OFFICE VISIT (OUTPATIENT)
Dept: CARDIOLOGY | Facility: MEDICAL CENTER | Age: 35
End: 2025-02-11
Payer: MEDICARE

## 2025-02-11 ENCOUNTER — HOSPITAL ENCOUNTER (EMERGENCY)
Facility: MEDICAL CENTER | Age: 35
End: 2025-02-11
Attending: EMERGENCY MEDICINE
Payer: MEDICARE

## 2025-02-11 ENCOUNTER — APPOINTMENT (OUTPATIENT)
Dept: RADIOLOGY | Facility: MEDICAL CENTER | Age: 35
End: 2025-02-11
Attending: EMERGENCY MEDICINE
Payer: MEDICARE

## 2025-02-11 VITALS
RESPIRATION RATE: 17 BRPM | HEART RATE: 69 BPM | SYSTOLIC BLOOD PRESSURE: 136 MMHG | TEMPERATURE: 97.2 F | DIASTOLIC BLOOD PRESSURE: 78 MMHG | BODY MASS INDEX: 45.41 KG/M2 | WEIGHT: 266 LBS | OXYGEN SATURATION: 98 % | HEIGHT: 64 IN

## 2025-02-11 VITALS
OXYGEN SATURATION: 100 % | DIASTOLIC BLOOD PRESSURE: 74 MMHG | HEART RATE: 66 BPM | HEIGHT: 64 IN | RESPIRATION RATE: 16 BRPM | SYSTOLIC BLOOD PRESSURE: 124 MMHG | BODY MASS INDEX: 45.41 KG/M2 | WEIGHT: 266 LBS

## 2025-02-11 DIAGNOSIS — R07.89 ATYPICAL CHEST PAIN: ICD-10-CM

## 2025-02-11 DIAGNOSIS — R94.39 ABNORMAL STRESS TEST: ICD-10-CM

## 2025-02-11 DIAGNOSIS — R07.9 CHEST PAIN, UNSPECIFIED TYPE: ICD-10-CM

## 2025-02-11 LAB
ALBUMIN SERPL BCP-MCNC: 4.5 G/DL (ref 3.2–4.9)
ALBUMIN/GLOB SERPL: 2 G/DL
ALP SERPL-CCNC: 72 U/L (ref 30–99)
ALT SERPL-CCNC: 41 U/L (ref 2–50)
ANION GAP SERPL CALC-SCNC: 13 MMOL/L (ref 7–16)
AST SERPL-CCNC: 27 U/L (ref 12–45)
BASOPHILS # BLD AUTO: 1.2 % (ref 0–1.8)
BASOPHILS # BLD: 0.08 K/UL (ref 0–0.12)
BILIRUB SERPL-MCNC: 0.4 MG/DL (ref 0.1–1.5)
BUN SERPL-MCNC: 17 MG/DL (ref 8–22)
CALCIUM ALBUM COR SERPL-MCNC: 9.2 MG/DL (ref 8.5–10.5)
CALCIUM SERPL-MCNC: 9.6 MG/DL (ref 8.5–10.5)
CHLORIDE SERPL-SCNC: 109 MMOL/L (ref 96–112)
CO2 SERPL-SCNC: 16 MMOL/L (ref 20–33)
CREAT SERPL-MCNC: 0.82 MG/DL (ref 0.5–1.4)
EKG IMPRESSION: NORMAL
EOSINOPHIL # BLD AUTO: 0.31 K/UL (ref 0–0.51)
EOSINOPHIL NFR BLD: 4.7 % (ref 0–6.9)
ERYTHROCYTE [DISTWIDTH] IN BLOOD BY AUTOMATED COUNT: 42 FL (ref 35.9–50)
GFR SERPLBLD CREATININE-BSD FMLA CKD-EPI: 95 ML/MIN/1.73 M 2
GLOBULIN SER CALC-MCNC: 2.2 G/DL (ref 1.9–3.5)
GLUCOSE SERPL-MCNC: 119 MG/DL (ref 65–99)
HCG SERPL QL: NEGATIVE
HCT VFR BLD AUTO: 42.8 % (ref 37–47)
HGB BLD-MCNC: 14.5 G/DL (ref 12–16)
IMM GRANULOCYTES # BLD AUTO: 0.02 K/UL (ref 0–0.11)
IMM GRANULOCYTES NFR BLD AUTO: 0.3 % (ref 0–0.9)
LYMPHOCYTES # BLD AUTO: 2.2 K/UL (ref 1–4.8)
LYMPHOCYTES NFR BLD: 33.5 % (ref 22–41)
MCH RBC QN AUTO: 32.4 PG (ref 27–33)
MCHC RBC AUTO-ENTMCNC: 33.9 G/DL (ref 32.2–35.5)
MCV RBC AUTO: 95.5 FL (ref 81.4–97.8)
MONOCYTES # BLD AUTO: 0.52 K/UL (ref 0–0.85)
MONOCYTES NFR BLD AUTO: 7.9 % (ref 0–13.4)
NEUTROPHILS # BLD AUTO: 3.43 K/UL (ref 1.82–7.42)
NEUTROPHILS NFR BLD: 52.4 % (ref 44–72)
NRBC # BLD AUTO: 0 K/UL
NRBC BLD-RTO: 0 /100 WBC (ref 0–0.2)
NT-PROBNP SERPL IA-MCNC: 166 PG/ML (ref 0–125)
PLATELET # BLD AUTO: 186 K/UL (ref 164–446)
PMV BLD AUTO: 11.6 FL (ref 9–12.9)
POTASSIUM SERPL-SCNC: 3.9 MMOL/L (ref 3.6–5.5)
PROT SERPL-MCNC: 6.7 G/DL (ref 6–8.2)
RBC # BLD AUTO: 4.48 M/UL (ref 4.2–5.4)
SODIUM SERPL-SCNC: 138 MMOL/L (ref 135–145)
TROPONIN T SERPL-MCNC: 7 NG/L (ref 6–19)
TROPONIN T SERPL-MCNC: <6 NG/L (ref 6–19)
WBC # BLD AUTO: 6.6 K/UL (ref 4.8–10.8)

## 2025-02-11 PROCEDURE — 3078F DIAST BP <80 MM HG: CPT

## 2025-02-11 PROCEDURE — 71045 X-RAY EXAM CHEST 1 VIEW: CPT

## 2025-02-11 PROCEDURE — 99213 OFFICE O/P EST LOW 20 MIN: CPT

## 2025-02-11 PROCEDURE — 80053 COMPREHEN METABOLIC PANEL: CPT

## 2025-02-11 PROCEDURE — 99214 OFFICE O/P EST MOD 30 MIN: CPT

## 2025-02-11 PROCEDURE — 84703 CHORIONIC GONADOTROPIN ASSAY: CPT

## 2025-02-11 PROCEDURE — 36415 COLL VENOUS BLD VENIPUNCTURE: CPT

## 2025-02-11 PROCEDURE — 96374 THER/PROPH/DIAG INJ IV PUSH: CPT

## 2025-02-11 PROCEDURE — 93005 ELECTROCARDIOGRAM TRACING: CPT | Mod: TC

## 2025-02-11 PROCEDURE — 3074F SYST BP LT 130 MM HG: CPT

## 2025-02-11 PROCEDURE — 99285 EMERGENCY DEPT VISIT HI MDM: CPT

## 2025-02-11 PROCEDURE — 83880 ASSAY OF NATRIURETIC PEPTIDE: CPT

## 2025-02-11 PROCEDURE — 700111 HCHG RX REV CODE 636 W/ 250 OVERRIDE (IP): Mod: UD | Performed by: EMERGENCY MEDICINE

## 2025-02-11 PROCEDURE — 96375 TX/PRO/DX INJ NEW DRUG ADDON: CPT

## 2025-02-11 PROCEDURE — RXMED WILLOW AMBULATORY MEDICATION CHARGE

## 2025-02-11 PROCEDURE — 84484 ASSAY OF TROPONIN QUANT: CPT | Mod: 91

## 2025-02-11 PROCEDURE — 93005 ELECTROCARDIOGRAM TRACING: CPT | Mod: TC | Performed by: EMERGENCY MEDICINE

## 2025-02-11 PROCEDURE — 85025 COMPLETE CBC W/AUTO DIFF WBC: CPT

## 2025-02-11 RX ORDER — MORPHINE SULFATE 4 MG/ML
2 INJECTION INTRAVENOUS ONCE
Status: COMPLETED | OUTPATIENT
Start: 2025-02-11 | End: 2025-02-11

## 2025-02-11 RX ORDER — RANOLAZINE 500 MG/1
500 TABLET, EXTENDED RELEASE ORAL 2 TIMES DAILY
Qty: 180 TABLET | Refills: 3 | Status: SHIPPED | OUTPATIENT
Start: 2025-02-11

## 2025-02-11 RX ORDER — ISOSORBIDE MONONITRATE 30 MG/1
30 TABLET, EXTENDED RELEASE ORAL EVERY MORNING
Qty: 90 TABLET | Refills: 3 | Status: SHIPPED | OUTPATIENT
Start: 2025-02-11 | End: 2025-02-11

## 2025-02-11 RX ORDER — NITROGLYCERIN 0.4 MG/1
0.4 TABLET SUBLINGUAL PRN
Qty: 25 TABLET | Refills: 11 | Status: SHIPPED | OUTPATIENT
Start: 2025-02-11

## 2025-02-11 RX ORDER — KETOROLAC TROMETHAMINE 15 MG/ML
15 INJECTION, SOLUTION INTRAMUSCULAR; INTRAVENOUS ONCE
Status: COMPLETED | OUTPATIENT
Start: 2025-02-11 | End: 2025-02-11

## 2025-02-11 RX ADMIN — KETOROLAC TROMETHAMINE 15 MG: 15 INJECTION, SOLUTION INTRAMUSCULAR; INTRAVENOUS at 17:23

## 2025-02-11 RX ADMIN — MORPHINE SULFATE 2 MG: 4 INJECTION, SOLUTION INTRAMUSCULAR; INTRAVENOUS at 17:22

## 2025-02-11 ASSESSMENT — ENCOUNTER SYMPTOMS
NEAR-SYNCOPE: 0
SHORTNESS OF BREATH: 0
DIZZINESS: 0
SYNCOPE: 0
HEADACHES: 0
PND: 0
PALPITATIONS: 0
ORTHOPNEA: 0
LIGHT-HEADEDNESS: 0
DYSPNEA ON EXERTION: 0

## 2025-02-11 ASSESSMENT — PAIN DESCRIPTION - PAIN TYPE
TYPE: ACUTE PAIN

## 2025-02-11 ASSESSMENT — FIBROSIS 4 INDEX
FIB4 SCORE: 0.73
FIB4 SCORE: 0.73

## 2025-02-11 NOTE — ED TRIAGE NOTES
Chief Complaint   Patient presents with    Chest Pain     Pt. Reports left side chest pain.  Was at cardiologist today and received 3 tabs of SL nitro that relieved the pain.  She states her stress test was positive and cardiologist told her to come back to the ED if she developed more CP.  Pain came back right before she came back.      Nausea     Associated with the CP.       Pt. Ambulated into triage for above complaint.  EKG done prior to triage.  Protocol ordered.  Pt. To lab chairs for blood work.  Encouraged to alert staff for any changes in condition.

## 2025-02-11 NOTE — PROGRESS NOTES
Chief Complaint   Patient presents with    Palpitations     Follow up          Subjective:   Kristin Balderrama is a 33 y.o. female who presents today for follow-up.     Patient of Dr. Leon.  Current medical problems include sinus tachycardia, ablation 2016 for sinus node modification for IST (Inappropriate Sinus Tachycardia), IST ablation 2016, POTS on chronic Corlanor and beta-blocker therapy, hypermobility syndrome, TONYA, IIH (idiopathic intracranial hypertension), NPH normal pressure hydrocephalus, optic neuropathy, chronic pain syndrome, bladder incontinence, S/P sacral stimulator, cervical neuralgia with leg weakness, nephrolithiasis S/P kidney stone extraction stent placement 2/14/2024, hip fracture 1/27/2023 H/O psychiatric issues. Their last clinic visit was 1/23/2025 with Reinier Díaz.    At that last follow up patient has had multiple ED visit for continued chest pain. During her Ed workup her troponin and cardiac markers are negative. A stress test was ordered due to risk and continued chest pain. It did show apical wall, apical septum ischemia, summed stress difference score is 6.     Today's visit:  Patient reports that she has had continuous chest pain since completing her stress test. She said that she has tried to take tylenol and ibuprofen but it did not improve her chest pain. She continues to have palpitations and experience lightheadedness/dizziness. She denies syncope. She is taking her current medication as prescribed. She denies exertional dyspnea or N/V.      Cardiovascular Risk Factors:  1. Smoking status: Never  2. Type II Diabetes Mellitus: No   Lab Results   Component Value Date/Time    HBA1C 6.0 (A) 09/23/2024 12:11 PM    HBA1C 5.2 07/17/2023 07:56 AM     3. Hypertension: Yes  4. Dyslipidemia: No   Cholesterol,Tot   Date Value Ref Range Status   01/20/2021 172 100 - 199 mg/dL Final     LDL   Date Value Ref Range Status   01/20/2021 98 <100 mg/dL Final     HDL   Date Value Ref  "Range Status   01/20/2021 48 >=40 mg/dL Final     Triglycerides   Date Value Ref Range Status   01/20/2021 130 0 - 149 mg/dL Final       Past Medical History:   Diagnosis Date    Abdominal pain     Anginal syndrome     Random chest pain especially with tachycardia    Apnea, sleep     Arthritis     ASTHMA     when around smoke    Back pain     Borderline personality disorder (HCC)     Chickenpox     Chronic UTI 09/18/2010    Daytime sleepiness     Dental disorder 03/08/2021    Infected tooth    Depression     Diabetes (HCC)     Diarrhea     Incontinece    Disorder of thyroid     Hashimoto's    Fatigue     Frequent headaches     Heart burn     History of falling     Inappropriate sinus tachycardia (HCC) 2016    Statusp post ablation by Dr. Kumar.    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    Urinary bladder disorder     Suprapubic cath. 2/8/22: Not anymore.     Urinary incontinence     Weakness     Wears glasses          Family History   Problem Relation Age of Onset    Hypertension Mother     Sleep Apnea Mother     Heart Disease Mother     Other Mother         hypothryod    No Known Problems Sister     Other Sister         Narcolepsy;fibromyalsia;bone;nerve    Genitourinary () Problems Sister         endometriosis    Hypertension Maternal Uncle     Heart Disease Maternal Grandmother     Hypertension Maternal Grandmother     Cancer " "Neg Hx          Social History     Tobacco Use    Smoking status: Never    Smokeless tobacco: Never   Vaping Use    Vaping status: Never Used   Substance Use Topics    Alcohol use: No     Alcohol/week: 0.0 oz    Drug use: Never         Allergies   Allergen Reactions    Cefdinir Shortness of Breath and Itching     Tolerated 1/18/17  Tolerates ceftriaxone  Tolerated augmentin 8/2019     Depakote [Divalproex Sodium] Unspecified     Muscle spasms/muscle pain and weakness      Doxycycline Anaphylaxis and Vomiting     Other reaction(s): pustules/blisters  Other reaction(s): stomach pain    Montelukast [Singulair] Unspecified     Cardiac effusion    Vancomycin Itching     Pt becomes flushed in face and chest.   RXN=7/10/16    Wound Dressing Adhesive Rash     By pt report-\"removes skin totally off\"    Amitriptyline Unspecified     Headaches      Amoxicillin Rash      Tolerates augmentin    Aripiprazole [Abilify] Unspecified     Headaches/muscle twitching      Clindamycin Nausea         Other reaction(s): nausea stomach pain    Erythromycin Rash     .  Other reaction(s): nausea stomach pain    Flomax [Tamsulosin Hydrochloride] Swelling    Hydromorphone      Other reaction(s): vomiting    Levaquin Unspecified     Severe muscle cramps in legs  RXN=unknown  Other reaction(s): leg muscle cramps    Metformin Unspecified     Increased lactic acid     Other reaction(s): itching and rash/nausea vomiting    Sulfa Drugs Hives, Itching, Myalgia and Unspecified     Muscle pain and weakness    Other reaction(s): unknown    Tamsulosin Swelling     Swelling of legs    Tape Rash     Tears skin off  coban with Tegaderm tape ok intermittently  RXN=ongoing    Sulfamethoxazole W-Trimethoprim Rash    Ciprofloxacin Rash          Keflex Rash     Pt states she gets a rash with this medication  Tolerates ceftriaxone  Can take with Benadryl    Levofloxacin Unspecified     Leg muscle cramps    Metronidazole Rash     \"Vision problems\"  Other " reaction(s): vision problems         Current Outpatient Medications   Medication Sig    nitroglycerin (NITROSTAT) 0.4 MG SL Tab Place 1 Tablet under the tongue as needed for Chest Pain (may repeat for chest pain every 5 mins not to exceed 3 doses).    ranolazine (RANEXA) 500 MG TABLET SR 12 HR Take 1 Tablet by mouth 2 times a day.    gabapentin (NEURONTIN) 400 MG Cap TAKE 3 CAPSULES BY MOUTH THREE TIMES DAILY.    naproxen (NAPROSYN) 500 MG Tab Take 1 tablet by mouth 2 times a day    metoprolol SR (TOPROL XL) 25 MG TABLET SR 24 HR Take 1 tablet by mouth 2 times a day.    spironolactone (ALDACTONE) 50 MG Tab Take 1 Tablet by mouth every day.    omeprazole (PRILOSEC) 20 MG delayed-release capsule Take 2 Capsules by mouth 2 times a day.    ondansetron (ZOFRAN ODT) 4 MG TABLET DISPERSIBLE DISSOLVE 1 TABLET ON THE TONGUE EVERY 8 HOURS FOR 5 DAYS AS NEEDED FOR NAUSEA/VOMITING    topiramate (TOPAMAX) 50 MG tablet Take 1 Tablet by mouth 2 times a day.    prochlorperazine (COMPAZINE) 10 MG Tab Take 1 Tablet by mouth every 6 hours as needed for Nausea/Vomiting.    tizanidine (ZANAFLEX) 4 MG capsule take 1 Tablet Three Times A Day as needed Qty 90  Dx.M54.16    scopolamine (TRANSDERM SCOP, 1.5 MG,) 1 mg/72hr PATCH 72 HR Place 1 Patch on the skin every 72 hours.    ipratropium-albuterol (COMBIVENT RESPIMAT)  MCG/ACT Aero Soln Inhale 2 Puffs 4 times a day as needed (shortness of breath, wheezing).    albuterol (PROVENTIL) 2.5mg/3ml Nebu Soln solution for nebulization Take 3 mL by nebulization every four hours as needed for Shortness of Breath.    ziprasidone (GEODON) 40 MG Cap Take 1 capsule by mouth at bedtime.    amLODIPine (NORVASC) 5 MG Tab Take 1 tablet by mouth every day.    Galcanezumab-gnlm (EMGALITY) 120 MG/ML Solution Auto-injector Inject 1 mL subcutaneously every month.    lamoTRIgine (LAMICTAL) 25 MG Tab Take 2 Tablets by mouth 2 times a day.    Rimegepant Sulfate (NURTEC) 75 MG TABLET DISPERSIBLE Take 1 tablet  "by mouth at onset of migraine or aura okay to repeat in 24 hours if needed.    sumatriptan (IMITREX) 20 MG/ACT nasal spray Give 1 dose at onset headache okay to repeat in 2 hours if needed for max of 2 doses in a 24 hour timeframe.    ivabradine (CORLANOR) 7.5 MG Tab tablet Take 1 Tablet by mouth 2 times a day with meals.    diphenhydrAMINE (BENADRYL) 25 MG Tab Take 25-50 mg by mouth 2 times a day. Scheduled    25 mg = AM  50 MG = PM    Melatonin 10 MG Tab Take 10 mg by mouth at bedtime.         Review of Systems   Constitutional: Positive for malaise/fatigue.   Cardiovascular:  Positive for chest pain. Negative for dyspnea on exertion, leg swelling, near-syncope, orthopnea, palpitations, paroxysmal nocturnal dyspnea and syncope.   Respiratory:  Negative for shortness of breath.    Neurological:  Negative for dizziness, headaches and light-headedness.           Objective:   /74 (BP Location: Left arm, Patient Position: Sitting)   Pulse 66   Resp 16   Ht 1.626 m (5' 4\")   Wt 121 kg (266 lb)   SpO2 100%  Body mass index is 45.66 kg/m².         Physical Exam  Vitals reviewed.   Constitutional:       General: She is not in acute distress.     Appearance: She is obese.   HENT:      Head: Normocephalic and atraumatic.   Cardiovascular:      Rate and Rhythm: Normal rate and regular rhythm.      Pulses: Normal pulses.      Heart sounds: No murmur heard.  Pulmonary:      Effort: Pulmonary effort is normal. No respiratory distress.      Breath sounds: Normal breath sounds.   Musculoskeletal:      Right lower leg: No edema.      Left lower leg: No edema.   Neurological:      Mental Status: She is alert and oriented to person, place, and time.      Gait: Gait normal.   Psychiatric:         Behavior: Behavior normal.             Lab Results   Component Value Date/Time    SODIUM 141 01/22/2025 05:10 PM    SODIUM 142 01/21/2025 03:13 PM    POTASSIUM 4.0 01/22/2025 05:10 PM    POTASSIUM 4.0 01/21/2025 03:13 PM    " CHLORIDE 111 (H) 01/22/2025 05:10 PM    CHLORIDE 109 01/21/2025 03:13 PM    CO2 19.0 (L) 01/22/2025 05:10 PM    CO2 16 (L) 01/21/2025 03:13 PM    GLUCOSE 143 (H) 01/22/2025 05:10 PM    GLUCOSE 106 (H) 01/21/2025 03:13 PM    BUN 16.0 01/22/2025 05:10 PM    BUN 13 01/21/2025 03:13 PM    CREATININE 0.80 01/22/2025 05:10 PM    CREATININE 0.63 01/21/2025 03:13 PM    CREATININE 0.75 (L) 07/20/2010 11:00 AM    BUNCREATRAT 20.0 01/22/2025 05:10 PM    BUNCREATRAT 19 07/20/2010 11:00 AM    GLOMRATE >59 07/20/2010 11:00 AM      Lab Results   Component Value Date/Time    WBC 5.50 01/22/2025 05:10 PM    WBC 5.3 01/21/2025 03:13 PM    WBC 6.1 07/20/2010 11:00 AM    RBC 4.44 01/22/2025 05:10 PM    RBC 4.66 01/21/2025 03:13 PM    RBC 4.38 07/20/2010 11:00 AM    HEMOGLOBIN 14.0 01/22/2025 05:10 PM    HEMOGLOBIN 14.8 01/21/2025 03:13 PM    HEMATOCRIT 41.4 01/22/2025 05:10 PM    HEMATOCRIT 43.0 01/21/2025 03:13 PM    MCV 93.3 01/22/2025 05:10 PM    MCV 92.3 01/21/2025 03:13 PM    MCV 93 07/20/2010 11:00 AM    MCH 31.6 01/22/2025 05:10 PM    MCH 31.8 01/21/2025 03:13 PM    MCH 30.1 07/20/2010 11:00 AM    MCHC 33.8 01/22/2025 05:10 PM    MCHC 34.4 01/21/2025 03:13 PM    MPV 10.0 01/22/2025 05:10 PM    MPV 11.3 01/21/2025 03:13 PM    NEUTSPOLYS 47.4 01/22/2025 05:10 PM    NEUTSPOLYS 52.60 01/21/2025 03:13 PM    LYMPHOCYTES 33.4 01/22/2025 05:10 PM    LYMPHOCYTES 30.90 01/21/2025 03:13 PM    MONOCYTES 10.4 (H) 01/22/2025 05:10 PM    MONOCYTES 8.80 01/21/2025 03:13 PM    EOSINOPHILS 7.5 (H) 01/22/2025 05:10 PM    EOSINOPHILS 6.40 01/21/2025 03:13 PM    BASOPHILS 1.3 (H) 01/22/2025 05:10 PM    BASOPHILS 0.70 01/21/2025 03:13 PM    ANISOCYTOSIS 1+ 10/05/2024 04:35 AM      Lab Results   Component Value Date/Time    CHOLSTRLTOT 198 09/30/2024 07:14 AM     (H) 09/30/2024 07:14 AM    HDL 44 09/30/2024 07:14 AM    TRIGLYCERIDE 175 (H) 09/30/2024 07:14 AM       Lab Results   Component Value Date/Time    TROPONINT 7 07/17/2024 1913     Lab  Results   Component Value Date/Time    NTPROBNP 70 07/17/2024 1913     Assessment:   1. Atypical chest pain  - EKG - Clinic Performed  - nitroglycerin (NITROSTAT) 0.4 MG SL Tab; Place 1 Tablet under the tongue as needed for Chest Pain (may repeat for chest pain every 5 mins not to exceed 3 doses).  Dispense: 25 Tablet; Refill: 11  - CT-CTA HEART W/3D IMAGE; Future  - ranolazine (RANEXA) 500 MG TABLET SR 12 HR; Take 1 Tablet by mouth 2 times a day.  Dispense: 180 Tablet; Refill: 3    2. Abnormal stress test  - nitroglycerin (NITROSTAT) 0.4 MG SL Tab; Place 1 Tablet under the tongue as needed for Chest Pain (may repeat for chest pain every 5 mins not to exceed 3 doses).  Dispense: 25 Tablet; Refill: 11  - CT-CTA HEART W/3D IMAGE; Future  - ranolazine (RANEXA) 500 MG TABLET SR 12 HR; Take 1 Tablet by mouth 2 times a day.  Dispense: 180 Tablet; Refill: 3          Medical Decision Making:  Today's Assessment / Plan:   Chest pain  Abnormal stress test  -Patient having chest pain in office today. EKG in office interpreted by me shows NSR rate 63  -Discussed in detail patients results of stress test. Discussed with continued symptoms moving forward with a coronary angiogram. Patient does not want to move forward with an invasive procedure at this time as she is concerned with risk given his medical history. I discussed medical management and CTA to work up the abnormal stress test and evaluate for abnormal coronary anatomy.   -Start Ranolazine 500 mg twice a day  -Nitroglycerine 0.4 mg as needed for chest pain not to exceed three doses  -ER precautions for worsening or increased symptoms.    POTS  Inappropriate sinus tachycardia  -Continue to monitor salt through diet  -Continue corlanor 7.5 mg twice a day  -Continue metoprolol 25 mg daily  -Blood pressure well controlled since starting spironolactone  -Continue spironolactone 25 mg daily     Return in about 4 weeks (around 3/11/2025).  Sooner if problems.    Edwina PURCELL  PER [employer].

## 2025-02-12 ENCOUNTER — PATIENT MESSAGE (OUTPATIENT)
Dept: CARDIOLOGY | Facility: MEDICAL CENTER | Age: 35
End: 2025-02-12
Payer: MEDICARE

## 2025-02-12 LAB — EKG IMPRESSION: NORMAL

## 2025-02-12 PROCEDURE — 93010 ELECTROCARDIOGRAM REPORT: CPT | Performed by: INTERNAL MEDICINE

## 2025-02-12 ASSESSMENT — HEART SCORE
ECG: NON-SPECIFIC REPOLARIZATION DISTURBANCE
TROPONIN: LESS THAN OR EQUAL TO NORMAL LIMIT
AGE: <45
HISTORY: SLIGHTLY SUSPICIOUS
RISK FACTORS: 1-2 RISK FACTORS
HEART SCORE: 2

## 2025-02-12 NOTE — ED PROVIDER NOTES
ER Provider Note    Scribed for Emery Piña M.d. by Marla Mercado. 2/11/2025  4:26 PM    Primary Care Provider: Fly Goodson M.D.    CHIEF COMPLAINT  Chief Complaint   Patient presents with    Chest Pain     Pt. Reports left side chest pain.  Was at cardiologist today and received 3 tabs of SL nitro that relieved the pain.  She states her stress test was positive and cardiologist told her to come back to the ED if she developed more CP.  Pain came back right before she came back.      Nausea     Associated with the CP.       EXTERNAL RECORDS REVIEWED  Care everywhere Patient is followed by cardiology for palpitations. History of sinus node ablation for IST, chronic pain, multiple chronic problems.       HPI/ROS  LIMITATION TO HISTORY   Select: : None    OUTSIDE HISTORIAN(S):   at bedside contributing to history as seen below.    Kristin Balderrama is a 34 y.o. female who presents to the ED for evaluation of chest pain onset prior to arrival. Patient explains that she was with her cardiologist today, and was given nitroglycerin. She was advised to prompt to the ED if symptoms persist. The nitroglycerin alleviated some pain. March 4th is her next appointment with [facility] cardiology. Denies fever or chills. No alleviating or exacerbating factors noted.     PAST MEDICAL HISTORY  Past Medical History:   Diagnosis Date    Abdominal pain     Anginal syndrome     Random chest pain especially with tachycardia    Apnea, sleep     Arthritis     ASTHMA     when around smoke    Back pain     Borderline personality disorder (HCC)     Chickenpox     Chronic UTI 09/18/2010    Daytime sleepiness     Dental disorder 03/08/2021    Infected tooth    Depression     Diabetes (HCC)     Diarrhea     Incontinece    Disorder of thyroid     Hashimoto's    Fatigue     Frequent headaches     Heart burn     History of falling     Inappropriate sinus tachycardia (HCC) 2016    Statusp post ablation by Dr. Kumar.    Migraine   "   Mitochondrial disease (HCC)     Multiple personality disorder (HCC)     Obesity     Pain     Back, 7/10    Painful joint     PCOS (polycystic ovarian syndrome)     Pneumonia 2012 12/2020    POTS (postural orthostatic tachycardia syndrome)     Psychosis (HCC)     Ringing in ears     Scoliosis     Shortness of breath     O2 as needed    Sinus tachycardia 10/31/2013    Sleep apnea     CPAP \"pulmonary doctor took me off mid year 2016\"    Snoring     Supraventricular tachycardia (HCC) 04/10/2019    Transverse myelitis (HCC)     2/8/22: Per pt: not anymore    Tuberculosis     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    Urinary bladder disorder     Suprapubic cath. 2/8/22: Not anymore.     Urinary incontinence     Weakness     Wears glasses        SURGICAL HISTORY  Past Surgical History:   Procedure Laterality Date    WI CYSTOSCOPY,INSERT URETERAL STENT Right 2/12/2024    Procedure: CYSTOSCOPY, WITH RIGHT URETEROSCOPY, WITH LITHOTRIPSY, WITH INSERTION OF RIGHT URETERAL STENT;  Surgeon: Josafat Roberson M.D.;  Location: [facility];  Service: Urology    WI CYSTO/URETERO/PYELOSCOPY, DX Right 2/12/2024    Procedure: URETEROSCOPY;  Surgeon: Josafat Roberson M.D.;  Location: SURGERY [facility];  Service: Urology    LASERTRIPSY Right 2/12/2024    Procedure: LITHOTRIPSY, USING LASER;  Surgeon: Josafat Roberson M.D.;  Location: SURGERY [facility];  Service: Urology    INGUINAL HERNIA LAPAROSCOPIC Right 07/21/2023    Procedure: LAPAROSCOPIC RIGHT INGUINAL HERNIA REPAIR WITH MESH;  Surgeon: Joe Noyola M.D.;  Location: SURGERY [facility];  Service: General    HERNIA REPAIR Right 07/21/2023    PB PERCUT FIX PBOX/NECK FEMUR FX Left 01/28/2023    Procedure: FIXATION, HIP, USING CANNULATED SCREW;  Surgeon: Noman Abdul M.D.;  Location: SURGERY [facility];  Service: Orthopedics    WI LAP,DIAGNOSTIC ABDOMEN  02/14/2022    Procedure: LAPAROSCOPY;  Surgeon: Seamus Pisano M.D.;  Location: SURGERY SAME DAY " [facility];  Service: Gynecology    OVARIAN CYSTECTOMY Right 02/14/2022    Procedure: EXCISION, CYST, OVARY;  Surgeon: Seamus Pisano M.D.;  Location: SURGERY SAME DAY [facility];  Service: Gynecology    BOWEL STIMULATOR INSERTION  03/10/2021    Procedure: INSERTION, ELECTRODE LEADS AND PULSE GENERATOR, NEUROSTIMULATOR, SACRAL - REMOVAL OF INTERSTIM WITH REPLACEMENT OF SACRAL NEUROMODULATION DEVICE;  Surgeon: Joe Noyola M.D.;  Location: SURGERY [facility];  Service: General    MUSCLE BIOPSY Right 01/26/2017    Procedure: MUSCLE BIOPSY - THIGH;  Surgeon: Iisdro Vigil M.D.;  Location: SURGERY [facility];  Service:     GASTROSCOPY WITH BALLOON DILATATION N/A 01/04/2017    Procedure: GASTROSCOPY WITH DILATATION;  Surgeon: Torres Vargas M.D.;  Location: SURGERY [facility];  Service:     BOWEL STIMULATOR INSERTION  07/13/2016    Procedure: BOWEL STIMULATOR INSERTION FOR PERMANENT INTERSTIM SACRAL IMPLANT;  Surgeon: Joe Noyola M.D.;  Location: SURGERY [facility];  Service:     RECOVERY  01/27/2016    Procedure: CATH LAB EP STUDY WITH SINUS NODE MODIFICATION ABHINAV;  Surgeon: Recoveryonly Surgery;  Location: SURGERY [facility] PROC [facility];  Service:     OTHER CARDIAC SURGERY  01/2016    cardiac ablation    NEURO DEST FACET L/S W/IG SNGL  04/21/2015    Performed by Reza Tabor at [facility]    LUMBAR FUSION ANTERIOR  08/21/2012    Performed by SUSIE SAWANT at SURGERY [facility] ORS    ALYSSA BY LAPAROSCOPY  08/29/2010    Performed by SHAYY JOHNS at SURGERY [facility] ORS    LAMINOTOMY      OTHER ABDOMINAL SURGERY      TONSILLECTOMY      tonsillectomy       FAMILY HISTORY  Family History   Problem Relation Age of Onset    Hypertension Mother     Sleep Apnea Mother     Heart Disease Mother     Other Mother         hypothryod    No Known Problems Sister     Other Sister         Narcolepsy;fibromyalsia;bone;nerve    Genitourinary () Problems Sister         endometriosis     Hypertension Maternal Uncle     Heart Disease Maternal Grandmother     Hypertension Maternal Grandmother     Cancer Neg Hx        SOCIAL HISTORY   reports that she has never smoked. She has never used smokeless tobacco. She reports that she does not drink alcohol and does not use drugs.    CURRENT MEDICATIONS  Discharge Medication List as of 2/11/2025  6:09 PM        CONTINUE these medications which have NOT CHANGED    Details   nitroglycerin (NITROSTAT) 0.4 MG SL Tab Place 1 Tablet under the tongue as needed for Chest Pain (may repeat for chest pain every 5 mins not to exceed 3 doses)., Disp-25 Tablet, R-11, Normal      ranolazine (RANEXA) 500 MG TABLET SR 12 HR Take 1 Tablet by mouth 2 times a day., Disp-180 Tablet, R-3, Normal      gabapentin (NEURONTIN) 400 MG Cap TAKE 3 CAPSULES BY MOUTH THREE TIMES DAILY. FOR 30 DAY SUPPLY. DX: M79.7Disp-270 Capsule, R-0, Normal      naproxen (NAPROSYN) 500 MG Tab Take 1 tablet by mouth 2 times a dayDx. M79.10 Qty 60 for 30 daysDisp-60 Tablet, R-0, Normal      metoprolol SR (TOPROL XL) 25 MG TABLET SR 24 HR Take 1 tablet by mouth 2 times a day.This a new dose as of 9/4/2024.Disp-180 Tablet, R-1, Normal      spironolactone (ALDACTONE) 50 MG Tab Take 1 Tablet by mouth every day., Disp-90 Tablet, R-3, Normal      omeprazole (PRILOSEC) 20 MG delayed-release capsule Take 2 Capsules by mouth 2 times a day., Disp-180 Capsule, R-3, Normal      ondansetron (ZOFRAN ODT) 4 MG TABLET DISPERSIBLE DISSOLVE 1 TABLET ON THE TONGUE EVERY 8 HOURS FOR 5 DAYS AS NEEDED FOR NAUSEA/VOMITING, Historical Med      topiramate (TOPAMAX) 50 MG tablet Take 1 Tablet by mouth 2 times a day.THIS IS A 90 DAY SUPPLYDisp-180 Tablet, R-4, Normal      prochlorperazine (COMPAZINE) 10 MG Tab Take 1 Tablet by mouth every 6 hours as needed for Nausea/Vomiting., Disp-30 Tablet, R-0, Normal      tizanidine (ZANAFLEX) 4 MG capsule take 1 Tablet Three Times A Day as needed Qty 90  Dx.M54.16M79.10Disp-90 Capsule,  R-0, Normal      scopolamine (TRANSDERM SCOP, 1.5 MG,) 1 mg/72hr PATCH 72 HR Place 1 Patch on the skin every 72 hours., Disp-7 Patch, R-1, Normal      ipratropium-albuterol (COMBIVENT RESPIMAT)  MCG/ACT Aero Soln Inhale 2 Puffs 4 times a day as needed (shortness of breath, wheezing)., Disp-4 g, R-1, Normal      albuterol (PROVENTIL) 2.5mg/3ml Nebu Soln solution for nebulization Take 3 mL by nebulization every four hours as needed for Shortness of Breath., Disp-75 mL, R-0, Normal      ziprasidone (GEODON) 40 MG Cap Take 1 capsule by mouth at bedtime., Disp-90 Capsule, R-1, Normal      amLODIPine (NORVASC) 5 MG Tab Take 1 tablet by mouth every day., Disp-90 Tablet, R-1, Normal      Galcanezumab-gnlm (EMGALITY) 120 MG/ML Solution Auto-injector Inject 1 mL subcutaneously every month., Disp-1 mL, R-11, Normal      lamoTRIgine (LAMICTAL) 25 MG Tab Take 2 Tablets by mouth 2 times a day., Disp-360 Tablet, R-1, Normal      Rimegepant Sulfate (NURTEC) 75 MG TABLET DISPERSIBLE Take 1 tablet by mouth at onset of migraine or aura okay to repeat in 24 hours if needed., Disp-8 Tablet, R-5, Normal      sumatriptan (IMITREX) 20 MG/ACT nasal spray Give 1 dose at onset headache okay to repeat in 2 hours if needed for max of 2 doses in a 24 hour timeframe., Disp-6 Each, R-5, Normal      ivabradine (CORLANOR) 7.5 MG Tab tablet Take 1 Tablet by mouth 2 times a day with meals.holdDisp-60 Tablet, R-3, Normal      diphenhydrAMINE (BENADRYL) 25 MG Tab Take 25-50 mg by mouth 2 times a day. Scheduled    25 mg = AM  50 MG = PM, Historical Med      Melatonin 10 MG Tab Take 10 mg by mouth at bedtime., Historical Med             ALLERGIES  Cefdinir, Depakote [divalproex sodium], Doxycycline, Montelukast [singulair], Vancomycin, Wound dressing adhesive, Amitriptyline, Amoxicillin, Aripiprazole [abilify], Clindamycin, Erythromycin, Flomax [tamsulosin hydrochloride], Hydromorphone, Levaquin, Metformin, Sulfa drugs, Tamsulosin, Tape,  "Sulfamethoxazole w-trimethoprim, Ciprofloxacin, Keflex, Levofloxacin, and Metronidazole    PHYSICAL EXAM  VITAL SIGNS: BP (!) 149/76   Pulse 74   Temp 36.2 °C (97.2 °F) (Temporal)   Resp 16   Ht 1.626 m (5' 4\")   Wt 121 kg (266 lb)   SpO2 99%   BMI 45.66 kg/m²   Pulse ox interpretation: I interpret this pulse ox as normal.  Constitutional: Alert in no apparent distress. Appears in her usual state of health  HENT: No signs of trauma, Bilateral external ears normal, Nose normal.   Eyes: Conjunctiva normal, Non-icteric.   Neck: Normal range of motion, Supple, No stridor.   Lymphatic: No lymphadenopathy noted.   Cardiovascular: Regular rate and rhythm, no murmurs.   Thorax & Lungs: Normal breath sounds, No respiratory distress, No wheezing  Abdomen: Bowel sounds normal, Soft, No tenderness, No masses, No pulsatile masses. No peritoneal signs.  Skin: Warm, Dry, No erythema, No rash.   Back: No midline bony tenderness.  Extremities: Intact distal pulses, No edema, No cyanosis.  Musculoskeletal: Good range of motion in all major joints. No or major deformities noted.   Neurologic: Alert , Normal motor function, Normal sensory function, No focal deficits noted.   Psychiatric: Affect dramatic, Judgment normal, Mood normal. Anxious.    DIAGNOSTIC STUDIES    Labs:   Labs Reviewed   COMP METABOLIC PANEL - Abnormal; Notable for the following components:       Result Value    Co2 16 (*)     Glucose 119 (*)     All other components within normal limits   PROBRAIN NATRIURETIC PEPTIDE, NT - Abnormal; Notable for the following components:    NT-proBNP 166 (*)     All other components within normal limits   CBC WITH DIFFERENTIAL   TROPONIN   TROPONIN   HCG QUAL SERUM   ESTIMATED GFR   All labs reviewed by me.    EKG:   I have independently interpreted this EKG  Results for orders placed or performed during the hospital encounter of 02/11/25   EKG (NOW)   Result Value Ref Range    Report       [facility] " Emergency Dept.    Test Date:  2025  Pt Name:    HARLEY DE LA CRUZ              Department: ER  MRN:        [id number]                      Room:  Gender:     Female                       Technician: [id number]  :        1990                   Requested By:ER TRIAGE PROTOCOL  Order #:    [id number]                    Reading MD:    Measurements  Intervals                                Axis  Rate:       75                           P:          36  MD:         150                          QRS:        25  QRSD:       102                          T:          35  QT:         409  QTc:        457    Interpretive Statements  Sinus rhythm  Compared to ECG 2025 11:15:25  No significant changes       *Note: Due to a large number of results and/or encounters for the requested time period, some results have not been displayed. A complete set of results can be found in Results Review.         Radiology:   The attending emergency physician has independently interpreted the diagnostic imaging associated with this visit and am waiting the final reading from the radiologist.     Preliminary interpretation is a follows: Normal    Radiologist interpretation:   DX-CHEST-PORTABLE (1 VIEW)   Final Result      No acute cardiac or pulmonary abnormalities are identified.          COURSE & MEDICAL DECISION MAKING     INITIAL ASSESSMENT, COURSE AND PLAN  Care Narrative:     CHEST PAIN:   HEART Score for Major Cardiac Events  HEART Score     History: Slightly suspicious  ECG: Non-specific repolarization disturbance  Age: <45  Risk Factors: 1-2 risk factors  Troponin: Less than or equal to normal limit    Heart Score: 2    Total Score   0-3 Points = Low Score, risk of MACE 0.9-1.7%.  4-6 Points = Moderate Score, risk of MACE 12-16.6%  7-10 Points = High Score, risk of MACE 50-65%      4:27 PM Patient presents to the ED with chest pain. Per triage protocol, EKG, HCG qual serum, Troponin, pBNP, CMP, CBC w/ diff, DX-Chest, was ordered.  Patient evaluated at bedside and discussed plan of care, including proceeding with chest pain protocol. Patient will be treated with morphine 2 mg IV and Toradol 15 mg IV. Differential diagnoses include but not limited to: ACS, Musculoskeletal pain, Respiratory infection, Chronic pain syndrome    4:35 PM Review of laboratory results reveal that her BNP was high. I updated patient at bedside that it was barely high. Her troponin is undetectably low.     6:03 PM Repeat troponin is negative.  Heart score is 2.  No evidence of ACS or pneumonia.  Patient has significant chronic pain comorbidities, is already followed by cardiology, has no acute findings, and is safe to discharge to follow-up with the cardiology visit that was abbreviated today because of presentation to the emergency department.    ADDITIONAL PROBLEM MANAGED  History of chronic intermittent chest pain complicates today's presentation.    DISPOSITION AND DISCUSSIONS  I have discussed management of the patient with the following physicians and ARIES's:  None    Discussion of management with other Q or appropriate source(s): None     Escalation of care considered, and ultimately not performed: acute inpatient care management, however at this time, the patient is most appropriate for outpatient management.    Barriers to care at this time, including but not limited to:  None .     Decision tools and prescription drugs considered including, but not limited to: Medication modification considered, but not indicated based on today's encounter .    The patient will return for new or worsening symptoms and is stable at the time of discharge.    The patient is referred to a primary physician for blood pressure management, diabetic screening, and for all other preventative health concerns.    DISPOSITION:  Patient will be discharged home in stable condition.    FOLLOW UP:  Cardiology      please reschedule your appointment      OUTPATIENT MEDICATIONS:  Discharge  Medication List as of 2/11/2025  6:09 PM            FINAL DIAGNOSIS  1. Chest pain, unspecified type          I, Marla Mercado (Eva), am scribing for, and in the presence of, Emery Piña M.D..    Electronically signed by: Marla Mercado (Eva), 2/11/2025    IEmery M.D. personally performed the services described in this documentation, as scribed by Marla Mercado in my presence, and it is both accurate and complete.

## 2025-02-12 NOTE — DISCHARGE INSTRUCTIONS
Fortunately, there was no sign of a dangerous cause of your symptoms.  It is safe to let you go home, and to have you reschedule today's missed cardiology appointment.

## 2025-02-12 NOTE — ED NOTES
Patient given discharge instructions and follow up information, verbalized understanding, discharged to mother.

## 2025-02-13 ENCOUNTER — PHARMACY VISIT (OUTPATIENT)
Dept: PHARMACY | Facility: MEDICAL CENTER | Age: 35
End: 2025-02-13
Payer: COMMERCIAL

## 2025-02-13 PROCEDURE — RXMED WILLOW AMBULATORY MEDICATION CHARGE

## 2025-02-14 ENCOUNTER — PATIENT MESSAGE (OUTPATIENT)
Dept: CARDIOLOGY | Facility: MEDICAL CENTER | Age: 35
End: 2025-02-14
Payer: MEDICARE

## 2025-02-15 ENCOUNTER — APPOINTMENT (OUTPATIENT)
Dept: RADIOLOGY | Facility: MEDICAL CENTER | Age: 35
DRG: 287 | End: 2025-02-15
Attending: EMERGENCY MEDICINE
Payer: MEDICARE

## 2025-02-15 ENCOUNTER — HOSPITAL ENCOUNTER (INPATIENT)
Facility: MEDICAL CENTER | Age: 35
LOS: 1 days | DRG: 287 | End: 2025-02-17
Attending: EMERGENCY MEDICINE | Admitting: INTERNAL MEDICINE
Payer: MEDICARE

## 2025-02-15 DIAGNOSIS — R07.9 CHEST PAIN, UNSPECIFIED TYPE: ICD-10-CM

## 2025-02-15 DIAGNOSIS — J45.40 MODERATE PERSISTENT ASTHMA WITHOUT COMPLICATION: ICD-10-CM

## 2025-02-15 DIAGNOSIS — R94.39 ABNORMAL STRESS TEST: ICD-10-CM

## 2025-02-15 LAB
ALBUMIN SERPL BCP-MCNC: 4.8 G/DL (ref 3.2–4.9)
ALBUMIN/GLOB SERPL: 2.2 G/DL
ALP SERPL-CCNC: 76 U/L (ref 30–99)
ALT SERPL-CCNC: 41 U/L (ref 2–50)
ANION GAP SERPL CALC-SCNC: 14 MMOL/L (ref 7–16)
AST SERPL-CCNC: 26 U/L (ref 12–45)
BASOPHILS # BLD AUTO: 0.6 % (ref 0–1.8)
BASOPHILS # BLD: 0.04 K/UL (ref 0–0.12)
BILIRUB SERPL-MCNC: 0.5 MG/DL (ref 0.1–1.5)
BUN SERPL-MCNC: 13 MG/DL (ref 8–22)
CALCIUM ALBUM COR SERPL-MCNC: 9.2 MG/DL (ref 8.5–10.5)
CALCIUM SERPL-MCNC: 9.8 MG/DL (ref 8.5–10.5)
CHLORIDE SERPL-SCNC: 109 MMOL/L (ref 96–112)
CO2 SERPL-SCNC: 16 MMOL/L (ref 20–33)
CREAT SERPL-MCNC: 0.84 MG/DL (ref 0.5–1.4)
EKG IMPRESSION: NORMAL
EOSINOPHIL # BLD AUTO: 0.23 K/UL (ref 0–0.51)
EOSINOPHIL NFR BLD: 3.3 % (ref 0–6.9)
ERYTHROCYTE [DISTWIDTH] IN BLOOD BY AUTOMATED COUNT: 40 FL (ref 35.9–50)
GFR SERPLBLD CREATININE-BSD FMLA CKD-EPI: 93 ML/MIN/1.73 M 2
GLOBULIN SER CALC-MCNC: 2.2 G/DL (ref 1.9–3.5)
GLUCOSE SERPL-MCNC: 134 MG/DL (ref 65–99)
HCG SERPL QL: NEGATIVE
HCT VFR BLD AUTO: 44.2 % (ref 37–47)
HGB BLD-MCNC: 15.1 G/DL (ref 12–16)
IMM GRANULOCYTES # BLD AUTO: 0.02 K/UL (ref 0–0.11)
IMM GRANULOCYTES NFR BLD AUTO: 0.3 % (ref 0–0.9)
LYMPHOCYTES # BLD AUTO: 1.61 K/UL (ref 1–4.8)
LYMPHOCYTES NFR BLD: 22.9 % (ref 22–41)
MCH RBC QN AUTO: 31.3 PG (ref 27–33)
MCHC RBC AUTO-ENTMCNC: 34.2 G/DL (ref 32.2–35.5)
MCV RBC AUTO: 91.5 FL (ref 81.4–97.8)
MONOCYTES # BLD AUTO: 0.56 K/UL (ref 0–0.85)
MONOCYTES NFR BLD AUTO: 8 % (ref 0–13.4)
NEUTROPHILS # BLD AUTO: 4.58 K/UL (ref 1.82–7.42)
NEUTROPHILS NFR BLD: 64.9 % (ref 44–72)
NRBC # BLD AUTO: 0 K/UL
NRBC BLD-RTO: 0 /100 WBC (ref 0–0.2)
NT-PROBNP SERPL IA-MCNC: 82 PG/ML (ref 0–125)
PLATELET # BLD AUTO: 234 K/UL (ref 164–446)
PMV BLD AUTO: 11.3 FL (ref 9–12.9)
POTASSIUM SERPL-SCNC: 3.9 MMOL/L (ref 3.6–5.5)
PROT SERPL-MCNC: 7 G/DL (ref 6–8.2)
RBC # BLD AUTO: 4.83 M/UL (ref 4.2–5.4)
SODIUM SERPL-SCNC: 139 MMOL/L (ref 135–145)
TROPONIN T SERPL-MCNC: <6 NG/L (ref 6–19)
TROPONIN T SERPL-MCNC: <6 NG/L (ref 6–19)
WBC # BLD AUTO: 7 K/UL (ref 4.8–10.8)

## 2025-02-15 PROCEDURE — 93005 ELECTROCARDIOGRAM TRACING: CPT | Mod: TC

## 2025-02-15 PROCEDURE — 36415 COLL VENOUS BLD VENIPUNCTURE: CPT

## 2025-02-15 PROCEDURE — 700111 HCHG RX REV CODE 636 W/ 250 OVERRIDE (IP): Mod: JZ,UD | Performed by: EMERGENCY MEDICINE

## 2025-02-15 PROCEDURE — A9270 NON-COVERED ITEM OR SERVICE: HCPCS | Mod: UD | Performed by: NURSE PRACTITIONER

## 2025-02-15 PROCEDURE — 84703 CHORIONIC GONADOTROPIN ASSAY: CPT

## 2025-02-15 PROCEDURE — 84484 ASSAY OF TROPONIN QUANT: CPT

## 2025-02-15 PROCEDURE — 96372 THER/PROPH/DIAG INJ SC/IM: CPT

## 2025-02-15 PROCEDURE — 93005 ELECTROCARDIOGRAM TRACING: CPT | Mod: TC | Performed by: EMERGENCY MEDICINE

## 2025-02-15 PROCEDURE — 99223 1ST HOSP IP/OBS HIGH 75: CPT | Mod: FS | Performed by: INTERNAL MEDICINE

## 2025-02-15 PROCEDURE — 96376 TX/PRO/DX INJ SAME DRUG ADON: CPT

## 2025-02-15 PROCEDURE — 80053 COMPREHEN METABOLIC PANEL: CPT

## 2025-02-15 PROCEDURE — G0378 HOSPITAL OBSERVATION PER HR: HCPCS

## 2025-02-15 PROCEDURE — 96374 THER/PROPH/DIAG INJ IV PUSH: CPT

## 2025-02-15 PROCEDURE — 83880 ASSAY OF NATRIURETIC PEPTIDE: CPT

## 2025-02-15 PROCEDURE — 71045 X-RAY EXAM CHEST 1 VIEW: CPT

## 2025-02-15 PROCEDURE — 85025 COMPLETE CBC W/AUTO DIFF WBC: CPT

## 2025-02-15 PROCEDURE — 99285 EMERGENCY DEPT VISIT HI MDM: CPT

## 2025-02-15 PROCEDURE — 96375 TX/PRO/DX INJ NEW DRUG ADDON: CPT

## 2025-02-15 PROCEDURE — 700111 HCHG RX REV CODE 636 W/ 250 OVERRIDE (IP): Mod: UD | Performed by: NURSE PRACTITIONER

## 2025-02-15 PROCEDURE — 700102 HCHG RX REV CODE 250 W/ 637 OVERRIDE(OP): Mod: UD | Performed by: NURSE PRACTITIONER

## 2025-02-15 RX ORDER — SPIRONOLACTONE 25 MG/1
50 TABLET ORAL DAILY
Status: DISCONTINUED | OUTPATIENT
Start: 2025-02-16 | End: 2025-02-17 | Stop reason: HOSPADM

## 2025-02-15 RX ORDER — TOPIRAMATE 25 MG/1
50 TABLET, FILM COATED ORAL 2 TIMES DAILY
Status: DISCONTINUED | OUTPATIENT
Start: 2025-02-15 | End: 2025-02-17 | Stop reason: HOSPADM

## 2025-02-15 RX ORDER — HEPARIN SODIUM 5000 [USP'U]/ML
5000 INJECTION, SOLUTION INTRAVENOUS; SUBCUTANEOUS EVERY 8 HOURS
Status: DISCONTINUED | OUTPATIENT
Start: 2025-02-15 | End: 2025-02-17 | Stop reason: HOSPADM

## 2025-02-15 RX ORDER — RANOLAZINE 500 MG/1
500 TABLET, EXTENDED RELEASE ORAL 2 TIMES DAILY
Status: DISCONTINUED | OUTPATIENT
Start: 2025-02-15 | End: 2025-02-17 | Stop reason: HOSPADM

## 2025-02-15 RX ORDER — NITROGLYCERIN 0.4 MG/1
0.4 TABLET SUBLINGUAL
Status: DISCONTINUED | OUTPATIENT
Start: 2025-02-15 | End: 2025-02-17 | Stop reason: HOSPADM

## 2025-02-15 RX ORDER — GABAPENTIN 400 MG/1
1200 CAPSULE ORAL 3 TIMES DAILY
Status: DISCONTINUED | OUTPATIENT
Start: 2025-02-15 | End: 2025-02-17 | Stop reason: HOSPADM

## 2025-02-15 RX ORDER — SCOPOLAMINE 1 MG/3D
1 PATCH, EXTENDED RELEASE TRANSDERMAL
Status: DISCONTINUED | OUTPATIENT
Start: 2025-02-16 | End: 2025-02-17 | Stop reason: HOSPADM

## 2025-02-15 RX ORDER — ONDANSETRON 2 MG/ML
4 INJECTION INTRAMUSCULAR; INTRAVENOUS ONCE
Status: COMPLETED | OUTPATIENT
Start: 2025-02-15 | End: 2025-02-15

## 2025-02-15 RX ORDER — ZIPRASIDONE HYDROCHLORIDE 40 MG/1
40 CAPSULE ORAL
Status: DISCONTINUED | OUTPATIENT
Start: 2025-02-15 | End: 2025-02-17 | Stop reason: HOSPADM

## 2025-02-15 RX ORDER — IVABRADINE 7.5 MG/1
7.5 TABLET, FILM COATED ORAL 2 TIMES DAILY WITH MEALS
Status: DISCONTINUED | OUTPATIENT
Start: 2025-02-15 | End: 2025-02-15

## 2025-02-15 RX ORDER — ALBUTEROL SULFATE 5 MG/ML
2.5 SOLUTION RESPIRATORY (INHALATION) EVERY 4 HOURS PRN
Status: DISCONTINUED | OUTPATIENT
Start: 2025-02-15 | End: 2025-02-17 | Stop reason: HOSPADM

## 2025-02-15 RX ORDER — LAMOTRIGINE 100 MG/1
50 TABLET ORAL 2 TIMES DAILY
Status: DISCONTINUED | OUTPATIENT
Start: 2025-02-15 | End: 2025-02-17 | Stop reason: HOSPADM

## 2025-02-15 RX ORDER — NAPROXEN 500 MG/1
500 TABLET ORAL 2 TIMES DAILY
Status: DISCONTINUED | OUTPATIENT
Start: 2025-02-15 | End: 2025-02-15

## 2025-02-15 RX ORDER — ONDANSETRON 4 MG/1
4 TABLET, ORALLY DISINTEGRATING ORAL EVERY 8 HOURS PRN
Status: DISCONTINUED | OUTPATIENT
Start: 2025-02-15 | End: 2025-02-17 | Stop reason: HOSPADM

## 2025-02-15 RX ORDER — IPRATROPIUM BROMIDE AND ALBUTEROL SULFATE 2.5; .5 MG/3ML; MG/3ML
3 SOLUTION RESPIRATORY (INHALATION)
Status: DISCONTINUED | OUTPATIENT
Start: 2025-02-15 | End: 2025-02-15

## 2025-02-15 RX ORDER — AMLODIPINE BESYLATE 5 MG/1
5 TABLET ORAL DAILY
Status: DISCONTINUED | OUTPATIENT
Start: 2025-02-16 | End: 2025-02-17 | Stop reason: HOSPADM

## 2025-02-15 RX ORDER — PROCHLORPERAZINE MALEATE 10 MG
10 TABLET ORAL EVERY 6 HOURS PRN
Status: DISCONTINUED | OUTPATIENT
Start: 2025-02-15 | End: 2025-02-17 | Stop reason: HOSPADM

## 2025-02-15 RX ORDER — MORPHINE SULFATE 4 MG/ML
4 INJECTION INTRAVENOUS ONCE
Status: COMPLETED | OUTPATIENT
Start: 2025-02-15 | End: 2025-02-15

## 2025-02-15 RX ORDER — METOPROLOL SUCCINATE 25 MG/1
25 TABLET, EXTENDED RELEASE ORAL 2 TIMES DAILY
Status: DISCONTINUED | OUTPATIENT
Start: 2025-02-15 | End: 2025-02-16

## 2025-02-15 RX ORDER — DOCUSATE SODIUM 250 MG
250 CAPSULE ORAL 2 TIMES DAILY
COMMUNITY

## 2025-02-15 RX ORDER — KETOROLAC TROMETHAMINE 15 MG/ML
15 INJECTION, SOLUTION INTRAMUSCULAR; INTRAVENOUS ONCE
Status: COMPLETED | OUTPATIENT
Start: 2025-02-15 | End: 2025-02-15

## 2025-02-15 RX ORDER — MORPHINE SULFATE 4 MG/ML
2 INJECTION INTRAVENOUS
Status: DISCONTINUED | OUTPATIENT
Start: 2025-02-15 | End: 2025-02-17 | Stop reason: HOSPADM

## 2025-02-15 RX ORDER — OMEPRAZOLE 20 MG/1
40 CAPSULE, DELAYED RELEASE ORAL 2 TIMES DAILY
Status: DISCONTINUED | OUTPATIENT
Start: 2025-02-15 | End: 2025-02-17 | Stop reason: HOSPADM

## 2025-02-15 RX ADMIN — MORPHINE SULFATE 4 MG: 4 INJECTION, SOLUTION INTRAMUSCULAR; INTRAVENOUS at 14:54

## 2025-02-15 RX ADMIN — METOPROLOL SUCCINATE 25 MG: 25 TABLET, EXTENDED RELEASE ORAL at 21:08

## 2025-02-15 RX ADMIN — HEPARIN SODIUM 5000 UNITS: 5000 INJECTION, SOLUTION INTRAVENOUS; SUBCUTANEOUS at 21:11

## 2025-02-15 RX ADMIN — ONDANSETRON 4 MG: 2 INJECTION INTRAMUSCULAR; INTRAVENOUS at 14:54

## 2025-02-15 RX ADMIN — GABAPENTIN 1200 MG: 400 CAPSULE ORAL at 21:07

## 2025-02-15 RX ADMIN — Medication 10 MG: at 21:08

## 2025-02-15 RX ADMIN — TOPIRAMATE 50 MG: 25 TABLET, FILM COATED ORAL at 21:10

## 2025-02-15 RX ADMIN — KETOROLAC TROMETHAMINE 15 MG: 15 INJECTION, SOLUTION INTRAMUSCULAR; INTRAVENOUS at 16:23

## 2025-02-15 RX ADMIN — OMEPRAZOLE 40 MG: 20 CAPSULE, DELAYED RELEASE ORAL at 21:08

## 2025-02-15 RX ADMIN — MORPHINE SULFATE 2 MG: 4 INJECTION, SOLUTION INTRAMUSCULAR; INTRAVENOUS at 22:13

## 2025-02-15 RX ADMIN — ZIPRASIDONE HYDROCHLORIDE 40 MG: 40 CAPSULE ORAL at 23:18

## 2025-02-15 RX ADMIN — MORPHINE SULFATE 2 MG: 4 INJECTION, SOLUTION INTRAMUSCULAR; INTRAVENOUS at 18:53

## 2025-02-15 RX ADMIN — RANOLAZINE 500 MG: 500 TABLET, FILM COATED, EXTENDED RELEASE ORAL at 21:11

## 2025-02-15 RX ADMIN — LAMOTRIGINE 50 MG: 25 TABLET ORAL at 21:09

## 2025-02-15 ASSESSMENT — LIFESTYLE VARIABLES
EVER HAD A DRINK FIRST THING IN THE MORNING TO STEADY YOUR NERVES TO GET RID OF A HANGOVER: NO
TOTAL SCORE: 0
EVER FELT BAD OR GUILTY ABOUT YOUR DRINKING: NO
HAVE YOU EVER FELT YOU SHOULD CUT DOWN ON YOUR DRINKING: NO
AVERAGE NUMBER OF DAYS PER WEEK YOU HAVE A DRINK CONTAINING ALCOHOL: 0
TOTAL SCORE: 0
DOES PATIENT WANT TO STOP DRINKING: NO
TOTAL SCORE: 0
ALCOHOL_USE: NO
ON A TYPICAL DAY WHEN YOU DRINK ALCOHOL HOW MANY DRINKS DO YOU HAVE: 0
HOW MANY TIMES IN THE PAST YEAR HAVE YOU HAD 5 OR MORE DRINKS IN A DAY: 0
HAVE PEOPLE ANNOYED YOU BY CRITICIZING YOUR DRINKING: NO
CONSUMPTION TOTAL: NEGATIVE

## 2025-02-15 ASSESSMENT — ENCOUNTER SYMPTOMS
NAUSEA: 1
CONSTIPATION: 0
DIAPHORESIS: 0
COUGH: 0
VOMITING: 0
CHILLS: 0
ABDOMINAL PAIN: 0
SHORTNESS OF BREATH: 0
SORE THROAT: 0
FEVER: 0
DIZZINESS: 0
MYALGIAS: 0
ORTHOPNEA: 0
PALPITATIONS: 0
PND: 0
DEPRESSION: 0
DIARRHEA: 0
FLANK PAIN: 0
NERVOUS/ANXIOUS: 0
HEARTBURN: 0
CLAUDICATION: 0
SINUS PAIN: 0
HEADACHES: 0
WHEEZING: 0

## 2025-02-15 ASSESSMENT — PAIN DESCRIPTION - PAIN TYPE
TYPE: ACUTE PAIN

## 2025-02-15 ASSESSMENT — SOCIAL DETERMINANTS OF HEALTH (SDOH)
WITHIN THE PAST 12 MONTHS, YOU WORRIED THAT YOUR FOOD WOULD RUN OUT BEFORE YOU GOT THE MONEY TO BUY MORE: NEVER TRUE
WITHIN THE LAST YEAR, HAVE YOU BEEN AFRAID OF YOUR PARTNER OR EX-PARTNER?: NO
WITHIN THE LAST YEAR, HAVE YOU BEEN KICKED, HIT, SLAPPED, OR OTHERWISE PHYSICALLY HURT BY YOUR PARTNER OR EX-PARTNER?: NO
WITHIN THE PAST 12 MONTHS, THE FOOD YOU BOUGHT JUST DIDN'T LAST AND YOU DIDN'T HAVE MONEY TO GET MORE: NEVER TRUE
WITHIN THE LAST YEAR, HAVE TO BEEN RAPED OR FORCED TO HAVE ANY KIND OF SEXUAL ACTIVITY BY YOUR PARTNER OR EX-PARTNER?: NO
WITHIN THE LAST YEAR, HAVE YOU BEEN HUMILIATED OR EMOTIONALLY ABUSED IN OTHER WAYS BY YOUR PARTNER OR EX-PARTNER?: NO
IN THE PAST 12 MONTHS, HAS THE ELECTRIC, GAS, OIL, OR WATER COMPANY THREATENED TO SHUT OFF SERVICE IN YOUR HOME?: NO

## 2025-02-15 ASSESSMENT — FIBROSIS 4 INDEX
FIB4 SCORE: 0.61
FIB4 SCORE: 0.79

## 2025-02-15 NOTE — ED TRIAGE NOTES
"Kristin Balderrama  34 y.o. female  Chief Complaint   Patient presents with    Chest Pain     Reports 8/10 \"pressure\" left sided chest pain that radiates into pt's left jaw since 2/14.    Dizziness     Since 2/14      N/V     Since today       Pt BIB [facility] from home for above complaint.  Pt reports no relief with nitro X 3  Pt is alert and oriented, speaking in full sentences, follows commands and responds appropriately to questions. Not in any apparent distress. Respirations are even and unlabored.  Pt placed in line for EKG. Pt educated on triage process. Pt encouraged to alert staff for any changes.    "

## 2025-02-15 NOTE — ED NOTES
Wheeled to the room by ED tech. Able to transfer from wheelchair to [facility]. Chart up for erp to see.

## 2025-02-15 NOTE — ED PROVIDER NOTES
"Emergency Physician Note    Chief Concern:  Chief Complaint   Patient presents with    Chest Pain     Reports 8/10 \"pressure\" left sided chest pain that radiates into pt's left jaw since 2/14.    Dizziness     Since 2/14      N/V     Since today         External Records Reviewed:  Outpatient records reviewed: Patient was seen in cardiology clinic 2/11/2025, followed by Dr. Leon.  Seen at this visit by APRN with cardiology service.  Noted to have a past medical history significant for sinus tachycardia, ablation in 2016 for sinus node modification for inappropriate sinus tachycardia, POTS on chronic Corlanor and beta-blocker therapy, hypermobility syndrome, TONYA, idiopathic intracranial hypertension, normal pressure hydrocephalus, optic neuropathy, chronic pain syndrome, bladder incontinence status post sacral stimulator, cervical neuralgia with leg weakness, nephrolithiasis status post kidney stone extraction with stent placement in February 2024, hip fracture in January 2023, and history of psychiatric issues.  Noted that she had recently been seen in the emergency department for continued chest pain.  Stress test was ordered due to risk and continued chest pain, that showed apical wall and apical septum ischemia.  She was prescribed nitroglycerin.  It was discussed with the patient that if she continued to have symptoms, they would move forward with coronary angiography, noted the patient does not want an invasive procedure at this time.  Medical management discussed, and agreed on with the patient.    HPI/ROS     HPI:  Kristin aBlderrama is a 34 y.o. female who presents to the emergency department today for evaluation of chest pain.  She is a longstanding history of chest pain, is followed by cardiology.  She has had a recent stress test performed that was positive, cardiac catheterization was recommended if chest pain continued, however patient elected against that procedure due to her history of " "Annetta-Danlos.  She reports associated shortness of breath, she was seen in cardiology clinic and prescribed nitroglycerin,, as well as ranolazine, and states that initially those medication significantly helped her symptoms, she began those medications about 3 days ago.  However yesterday her pain returned, and was more severe.  She took her prescribed medications without any significant improvement.  Chest pain persisted, so she presented to the emergency department for further evaluation.  Today she developed associated vomiting, she does report a history of gastroparesis but states today symptoms were not similar to her previous episodes of gastroparesis.  Due to worsening chest pain, she presented to the emergency department for further evaluation.  She reports no new leg pain or leg swelling.  She reports no recent fevers, no thoracic back pain.    PAST MEDICAL HISTORY  Past Medical History:   Diagnosis Date    Abdominal pain     Anginal syndrome     Random chest pain especially with tachycardia    Apnea, sleep     Arthritis     ASTHMA     when around smoke    Back pain     Borderline personality disorder (HCC)     Chickenpox     Chronic UTI 09/18/2010    Daytime sleepiness     Dental disorder 03/08/2021    Infected tooth    Depression     Diabetes (HCC)     Diarrhea     Incontinece    Disorder of thyroid     Hashimoto's    Fatigue     Frequent headaches     Heart burn     History of falling     Inappropriate sinus tachycardia (HCC) 2016    Statusp post ablation by Dr. Kumar.    Migraine     Mitochondrial disease (HCC)     Multiple personality disorder (HCC)     Obesity     Pain     Back, 7/10    Painful joint     PCOS (polycystic ovarian syndrome)     Pneumonia 2012 12/2020    POTS (postural orthostatic tachycardia syndrome)     Psychosis (HCC)     Ringing in ears     Scoliosis     Shortness of breath     O2 as needed    Sinus tachycardia 10/31/2013    Sleep apnea     CPAP \"pulmonary doctor took me off mid " "year 2016\"    Snoring     Supraventricular tachycardia (HCC) 04/10/2019    Transverse myelitis (HCC)     2/8/22: Per pt: not anymore    Tuberculosis     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    Urinary bladder disorder     Suprapubic cath. 2/8/22: Not anymore.     Urinary incontinence     Weakness     Wears glasses        SURGICAL HISTORY  Past Surgical History:   Procedure Laterality Date    IA CYSTOSCOPY,INSERT URETERAL STENT Right 2/12/2024    Procedure: CYSTOSCOPY, WITH RIGHT URETEROSCOPY, WITH LITHOTRIPSY, WITH INSERTION OF RIGHT URETERAL STENT;  Surgeon: Josafat Roberson M.D.;  Location: [facility];  Service: Urology    IA CYSTO/URETERO/PYELOSCOPY, DX Right 2/12/2024    Procedure: URETEROSCOPY;  Surgeon: Josafat Roberson M.D.;  Location: [facility];  Service: Urology    LASERTRIPSY Right 2/12/2024    Procedure: LITHOTRIPSY, USING LASER;  Surgeon: Josafat Roberson M.D.;  Location: [facility];  Service: Urology    INGUINAL HERNIA LAPAROSCOPIC Right 07/21/2023    Procedure: LAPAROSCOPIC RIGHT INGUINAL HERNIA REPAIR WITH MESH;  Surgeon: Joe Noyola M.D.;  Location: [facility];  Service: General    HERNIA REPAIR Right 07/21/2023    PB PERCUT FIX PBOX/NECK FEMUR FX Left 01/28/2023    Procedure: FIXATION, HIP, USING CANNULATED SCREW;  Surgeon: Noman Abdul M.D.;  Location: [facility];  Service: Orthopedics    IA LAP,DIAGNOSTIC ABDOMEN  02/14/2022    Procedure: LAPAROSCOPY;  Surgeon: Seamus Pisano M.D.;  Location: SURGERY SAME DAY [facility];  Service: Gynecology    OVARIAN CYSTECTOMY Right 02/14/2022    Procedure: EXCISION, CYST, OVARY;  Surgeon: Seamus Pisano M.D.;  Location: SURGERY SAME DAY [facility];  Service: Gynecology    BOWEL STIMULATOR INSERTION  03/10/2021    Procedure: INSERTION, ELECTRODE LEADS AND PULSE GENERATOR, NEUROSTIMULATOR, SACRAL - REMOVAL OF INTERSTIM WITH REPLACEMENT OF SACRAL NEUROMODULATION DEVICE;  Surgeon: Joe Noyola M.D.;  " Location: SURGERY [facility];  Service: General    MUSCLE BIOPSY Right 01/26/2017    Procedure: MUSCLE BIOPSY - THIGH;  Surgeon: Isidro Vigil M.D.;  Location: SURGERY [facility];  Service:     GASTROSCOPY WITH BALLOON DILATATION N/A 01/04/2017    Procedure: GASTROSCOPY WITH DILATATION;  Surgeon: Torres Vargas M.D.;  Location: SURGERY [facility];  Service:     BOWEL STIMULATOR INSERTION  07/13/2016    Procedure: BOWEL STIMULATOR INSERTION FOR PERMANENT INTERSTIM SACRAL IMPLANT;  Surgeon: Joe Noyola M.D.;  Location: SURGERY [facility];  Service:     RECOVERY  01/27/2016    Procedure: CATH LAB EP STUDY WITH SINUS NODE MODIFICATION LA;  Surgeon: Recoveryonly Surgery;  Location: SURGERY PRE-POST PROC UNIT [facility];  Service:     OTHER CARDIAC SURGERY  01/2016    cardiac ablation    NEURO DEST FACET L/S W/IG SNGL  04/21/2015    Performed by Reza Tabor at SURGERY [facility]    LUMBAR FUSION ANTERIOR  08/21/2012    Performed by SUSIE SAWANT at SURGERY [facility] ORS    ALYSSA BY LAPAROSCOPY  08/29/2010    Performed by SHAYY JOHNS at SURGERY [facility] ORS    LAMINOTOMY      OTHER ABDOMINAL SURGERY      TONSILLECTOMY      tonsillectomy       FAMILY HISTORY  Family History   Problem Relation Age of Onset    Hypertension Mother     Sleep Apnea Mother     Heart Disease Mother     Other Mother         hypothryod    No Known Problems Sister     Other Sister         Narcolepsy;fibromyalsia;bone;nerve    Genitourinary () Problems Sister         endometriosis    Hypertension Maternal Uncle     Heart Disease Maternal Grandmother     Hypertension Maternal Grandmother     Cancer Neg Hx        SOCIAL HISTORY   reports that she has never smoked. She has never used smokeless tobacco. She reports that she does not drink alcohol and does not use drugs.    CURRENT MEDICATIONS  Current Discharge Medication List        CONTINUE these medications which have NOT CHANGED    Details   tizanidine (ZANAFLEX)  4 MG Tab Take 4 mg by mouth at bedtime.      docusate sodium (COLACE) 250 MG capsule Take 250 mg by mouth 2 times a day.      ranolazine (RANEXA) 500 MG TABLET SR 12 HR Take 1 Tablet by mouth 2 times a day.  Qty: 180 Tablet, Refills: 3    Associated Diagnoses: Atypical chest pain; Abnormal stress test      gabapentin (NEURONTIN) 400 MG Cap TAKE 3 CAPSULES BY MOUTH THREE TIMES DAILY.  Qty: 270 Capsule, Refills: 0    Comments:  FOR 30 DAY SUPPLY. DX: M79.7      metoprolol SR (TOPROL XL) 25 MG TABLET SR 24 HR Take 1 tablet by mouth 2 times a day.  Qty: 180 Tablet, Refills: 1    Comments: This a new dose as of 9/4/2024.  Associated Diagnoses: Inappropriate sinus tachycardia (HCC)      spironolactone (ALDACTONE) 50 MG Tab Take 1 Tablet by mouth every day.  Qty: 90 Tablet, Refills: 3      omeprazole (PRILOSEC) 20 MG delayed-release capsule Take 2 Capsules by mouth 2 times a day.  Qty: 180 Capsule, Refills: 3    Associated Diagnoses: Gastroparesis; Gastroesophageal reflux disease without esophagitis; NSAID long-term use      topiramate (TOPAMAX) 50 MG tablet Take 1 Tablet by mouth 2 times a day.  Qty: 180 Tablet, Refills: 4    Comments: THIS IS A 90 DAY SUPPLY      ziprasidone (GEODON) 40 MG Cap Take 1 capsule by mouth at bedtime.  Qty: 90 Capsule, Refills: 1    Associated Diagnoses: Schizoaffective disorder, depressive type (HCC)      amLODIPine (NORVASC) 5 MG Tab Take 1 tablet by mouth every day.  Qty: 90 Tablet, Refills: 1    Associated Diagnoses: Primary hypertension      lamoTRIgine (LAMICTAL) 25 MG Tab Take 2 Tablets by mouth 2 times a day.  Qty: 360 Tablet, Refills: 1      ivabradine (CORLANOR) 7.5 MG Tab tablet Take 1 Tablet by mouth 2 times a day with meals.  Qty: 60 Tablet, Refills: 3    Comments: hold  Associated Diagnoses: Postural orthostatic tachycardia syndrome      diphenhydrAMINE (BENADRYL) 25 MG Tab Take 50 mg by mouth at bedtime.      Melatonin 10 MG Tab Take 10 mg by mouth at bedtime.     "  nitroglycerin (NITROSTAT) 0.4 MG SL Tab Place 1 Tablet under the tongue as needed for Chest Pain (may repeat for chest pain every 5 mins not to exceed 3 doses).  Qty: 25 Tablet, Refills: 11    Associated Diagnoses: Atypical chest pain; Abnormal stress test      scopolamine (TRANSDERM SCOP, 1.5 MG,) 1 mg/72hr PATCH 72 HR Place 1 Patch on the skin every 72 hours.  Qty: 7 Patch, Refills: 1    Associated Diagnoses: Annetta-Danlos syndrome; Gastroesophageal reflux disease without esophagitis; Nausea and vomiting, unspecified vomiting type      Galcanezumab-gnlm (EMGALITY) 120 MG/ML Solution Auto-injector Inject 1 mL subcutaneously every month.  Qty: 1 mL, Refills: 11      Rimegepant Sulfate (NURTEC) 75 MG TABLET DISPERSIBLE Take 1 tablet by mouth at onset of migraine or aura okay to repeat in 24 hours if needed.  Qty: 8 Tablet, Refills: 5      sumatriptan (IMITREX) 20 MG/ACT nasal spray Give 1 dose at onset headache okay to repeat in 2 hours if needed for max of 2 doses in a 24 hour timeframe.  Qty: 6 Each, Refills: 5             ALLERGIES  Cefdinir, Depakote [divalproex sodium], Doxycycline, Montelukast [singulair], Vancomycin, Wound dressing adhesive, Amitriptyline, Amoxicillin, Aripiprazole [abilify], Clindamycin, Erythromycin, Flomax [tamsulosin hydrochloride], Hydromorphone, Levaquin, Metformin, Sulfa drugs, Tamsulosin, Tape, Sulfamethoxazole w-trimethoprim, Ciprofloxacin, Keflex, Levofloxacin, and Metronidazole    PHYSICAL EXAM  Vital Signs: /57   Pulse 84   Temp 36.4 °C (97.6 °F) (Temporal)   Resp 16   Ht 1.626 m (5' 4\")   Wt 119 kg (261 lb 14.5 oz)   SpO2 90%   BMI 44.96 kg/m²   Constitutional: Alert, no acute distress  HENT: Normocephalic, atraumatic.  Cardiovascular: No tachycardia, regular rate and rhythm  Pulmonary: No respiratory distress, normal work of breathing, breath sounds quiet and equal bilaterally, no wheezing  Abdomen: Soft, non-distended  Skin: Warm, dry, no rashes or " lesions  Musculoskeletal: Normal range of motion in all extremities, no swelling or deformity noted  Neurologic: Alert, oriented, normal motor function, no speech deficits    Diagnostic Studies & Procedures    Labs:  All labs reviewed by me as noted below.    EK Lead EKG interpreted by me as noted below  Results for orders placed or performed during the hospital encounter of 02/15/25   EKG   Result Value Ref Range    Report       [facility]    Test Date:  2025-02-15  Pt Name:    HARLEY DE LA CRUZ              Department: ER  MRN:        [id number]                      Room:  Gender:     Female                       Technician: [id number]  :        1990                   Requested By:ER TRIAGE PROTOCOL  Order #:    [id number]                    Reading MD: KIRIT TRENT MD    Measurements  Intervals                                Axis  Rate:       78                           P:          48  AL:         146                          QRS:        17  QRSD:       99                           T:          9  QT:         438  QTc:        499    Interpretive Statements  Rate 78, sinus rhythm, no ST elevation or depression, no T wave inversions,  slight T wave deflections present in V1 and V2, not significantly changed  from prior EKG.  Electronically Signed On 02- 14:18:52 PST by KIRIT TRENT MD       *Note: Due to a large number of results and/or encounters for the requested time period, some results have not been displayed. A complete set of results can be found in Results Review.       Radiology:  The attending Emergency Physician has independently interpreted the following imaging:  I independently reviewed the chest x-ray, no focal infiltrate identified, no pulmonary edema.    DX-CHEST-PORTABLE (1 VIEW)   Final Result      No evidence of acute cardiopulmonary process.          Course and Medical Decision Making    Initial Assessment and Plan:  Ms. De La Cruz presents to  the emergency department today for evaluation of chest pain.  She has a longstanding history of chronic chest pain, previously seen multiple times in this emergency department, and currently followed by cardiology.  She did have a recent positive stress echo, with recommendation to proceed with cardiac catheterization if her chest pain continues.  However she declined, out of concern for undergoing a catheterization procedure with a medical history significant for Annetta-Danlos, but continues to have ongoing chest pain.  She presents to the emergency department with substernal chest pain, that she states is not exertional, but is alleviated by nitroglycerin, and for that reason she is appropriately concerned.  Previously, her pain has responded well to morphine and Toradol.    On laboratory evaluation CBC and CMP are unremarkable.  High sensitive troponin is undetectable, proBNP is normal.  EKG shows no ischemic changes.    She received 4 mg of morphine for pain, on my reassessment states that her pain is significantly improved.  After negative troponin resulted, 15 mg of Toradol administered as well, she states this has helped her symptoms in the past.    On my reassessment, she states her chest pain is now recurring, she describes this as a recurrent chest pressure.  Due to stuttering chest pain, plan at this time is for overnight monitoring.  She is still uncertain if she would be willing to proceed with cardiac catheterization, but is willing to consider it in the setting of ongoing chest pain.  Plan at this time is for admission to CDU for serial cardiac enzymes, overnight cardiac monitoring, and cardiology consultation in the morning.    I discussed her presentation with cardiologist on-call, who will consult on the patient.    Additional Problems and Disposition    I have discussed management of the patient with the following physicians:   Dr. Jeff, Cardiologist    Decision tools utilized including but not  limited to:   HEART Score: 4    Disposition:  Admit in stable condition    FINAL IMPRESSION   1. Chest pain, unspecified type    2. Abnormal stress test        FOLLOW UP:  John Leon M.D.  [address]  [address]  [facility] [id number] [phone]          [facility], Emergency Dept  [address] [id number] [phone]  Go to   If symptoms worsen

## 2025-02-16 PROBLEM — R07.9 CHEST PAIN: Status: ACTIVE | Noted: 2025-02-16

## 2025-02-16 LAB
ANION GAP SERPL CALC-SCNC: 12 MMOL/L (ref 7–16)
BUN SERPL-MCNC: 13 MG/DL (ref 8–22)
CALCIUM SERPL-MCNC: 8.9 MG/DL (ref 8.5–10.5)
CHLORIDE SERPL-SCNC: 110 MMOL/L (ref 96–112)
CO2 SERPL-SCNC: 16 MMOL/L (ref 20–33)
CREAT SERPL-MCNC: 0.85 MG/DL (ref 0.5–1.4)
GFR SERPLBLD CREATININE-BSD FMLA CKD-EPI: 91 ML/MIN/1.73 M 2
GLUCOSE SERPL-MCNC: 129 MG/DL (ref 65–99)
MAGNESIUM SERPL-MCNC: 1.9 MG/DL (ref 1.5–2.5)
POTASSIUM SERPL-SCNC: 4.2 MMOL/L (ref 3.6–5.5)
SODIUM SERPL-SCNC: 138 MMOL/L (ref 135–145)
VIT B12 SERPL-MCNC: 433 PG/ML (ref 211–911)

## 2025-02-16 PROCEDURE — 80048 BASIC METABOLIC PNL TOTAL CA: CPT

## 2025-02-16 PROCEDURE — 99222 1ST HOSP IP/OBS MODERATE 55: CPT | Mod: FS | Performed by: INTERNAL MEDICINE

## 2025-02-16 PROCEDURE — A9270 NON-COVERED ITEM OR SERVICE: HCPCS | Performed by: PHYSICIAN ASSISTANT

## 2025-02-16 PROCEDURE — 700111 HCHG RX REV CODE 636 W/ 250 OVERRIDE (IP): Mod: JZ,UD | Performed by: NURSE PRACTITIONER

## 2025-02-16 PROCEDURE — 83735 ASSAY OF MAGNESIUM: CPT

## 2025-02-16 PROCEDURE — 99233 SBSQ HOSP IP/OBS HIGH 50: CPT | Mod: FS | Performed by: INTERNAL MEDICINE

## 2025-02-16 PROCEDURE — 96376 TX/PRO/DX INJ SAME DRUG ADON: CPT

## 2025-02-16 PROCEDURE — 700111 HCHG RX REV CODE 636 W/ 250 OVERRIDE (IP): Performed by: NURSE PRACTITIONER

## 2025-02-16 PROCEDURE — 96372 THER/PROPH/DIAG INJ SC/IM: CPT

## 2025-02-16 PROCEDURE — 36415 COLL VENOUS BLD VENIPUNCTURE: CPT

## 2025-02-16 PROCEDURE — 700102 HCHG RX REV CODE 250 W/ 637 OVERRIDE(OP): Performed by: PHYSICIAN ASSISTANT

## 2025-02-16 PROCEDURE — A9270 NON-COVERED ITEM OR SERVICE: HCPCS | Mod: UD | Performed by: NURSE PRACTITIONER

## 2025-02-16 PROCEDURE — 700102 HCHG RX REV CODE 250 W/ 637 OVERRIDE(OP): Mod: UD | Performed by: NURSE PRACTITIONER

## 2025-02-16 PROCEDURE — 82607 VITAMIN B-12: CPT

## 2025-02-16 PROCEDURE — 770020 HCHG ROOM/CARE - TELE (206)

## 2025-02-16 RX ORDER — METOPROLOL SUCCINATE 50 MG/1
50 TABLET, EXTENDED RELEASE ORAL 2 TIMES DAILY
Status: DISCONTINUED | OUTPATIENT
Start: 2025-02-16 | End: 2025-02-17 | Stop reason: HOSPADM

## 2025-02-16 RX ORDER — ASPIRIN 81 MG/1
81 TABLET ORAL DAILY
Status: DISCONTINUED | OUTPATIENT
Start: 2025-02-16 | End: 2025-02-17 | Stop reason: HOSPADM

## 2025-02-16 RX ADMIN — MORPHINE SULFATE 2 MG: 4 INJECTION, SOLUTION INTRAMUSCULAR; INTRAVENOUS at 10:52

## 2025-02-16 RX ADMIN — OMEPRAZOLE 40 MG: 20 CAPSULE, DELAYED RELEASE ORAL at 07:46

## 2025-02-16 RX ADMIN — SPIRONOLACTONE 50 MG: 50 TABLET ORAL at 05:09

## 2025-02-16 RX ADMIN — TIZANIDINE 4 MG: 4 TABLET ORAL at 21:21

## 2025-02-16 RX ADMIN — RANOLAZINE 500 MG: 500 TABLET, FILM COATED, EXTENDED RELEASE ORAL at 05:11

## 2025-02-16 RX ADMIN — Medication 10 MG: at 21:21

## 2025-02-16 RX ADMIN — ASPIRIN 81 MG: 81 TABLET, COATED ORAL at 15:17

## 2025-02-16 RX ADMIN — AMLODIPINE BESYLATE 5 MG: 5 TABLET ORAL at 05:12

## 2025-02-16 RX ADMIN — LAMOTRIGINE 50 MG: 25 TABLET ORAL at 17:13

## 2025-02-16 RX ADMIN — METOPROLOL SUCCINATE 25 MG: 25 TABLET, EXTENDED RELEASE ORAL at 05:10

## 2025-02-16 RX ADMIN — TOPIRAMATE 50 MG: 25 TABLET, FILM COATED ORAL at 05:11

## 2025-02-16 RX ADMIN — HEPARIN SODIUM 5000 UNITS: 5000 INJECTION, SOLUTION INTRAVENOUS; SUBCUTANEOUS at 13:07

## 2025-02-16 RX ADMIN — GABAPENTIN 1200 MG: 400 CAPSULE ORAL at 05:12

## 2025-02-16 RX ADMIN — LAMOTRIGINE 50 MG: 25 TABLET ORAL at 05:13

## 2025-02-16 RX ADMIN — MORPHINE SULFATE 2 MG: 4 INJECTION, SOLUTION INTRAMUSCULAR; INTRAVENOUS at 20:02

## 2025-02-16 RX ADMIN — METOPROLOL SUCCINATE 50 MG: 50 TABLET, EXTENDED RELEASE ORAL at 17:13

## 2025-02-16 RX ADMIN — TOPIRAMATE 50 MG: 25 TABLET, FILM COATED ORAL at 17:13

## 2025-02-16 RX ADMIN — GABAPENTIN 1200 MG: 400 CAPSULE ORAL at 17:13

## 2025-02-16 RX ADMIN — MORPHINE SULFATE 2 MG: 4 INJECTION, SOLUTION INTRAMUSCULAR; INTRAVENOUS at 15:17

## 2025-02-16 RX ADMIN — MORPHINE SULFATE 2 MG: 4 INJECTION, SOLUTION INTRAMUSCULAR; INTRAVENOUS at 07:45

## 2025-02-16 RX ADMIN — HEPARIN SODIUM 5000 UNITS: 5000 INJECTION, SOLUTION INTRAVENOUS; SUBCUTANEOUS at 21:21

## 2025-02-16 RX ADMIN — GABAPENTIN 1200 MG: 400 CAPSULE ORAL at 13:06

## 2025-02-16 RX ADMIN — RANOLAZINE 500 MG: 500 TABLET, FILM COATED, EXTENDED RELEASE ORAL at 17:13

## 2025-02-16 RX ADMIN — OMEPRAZOLE 40 MG: 20 CAPSULE, DELAYED RELEASE ORAL at 20:02

## 2025-02-16 RX ADMIN — HEPARIN SODIUM 5000 UNITS: 5000 INJECTION, SOLUTION INTRAVENOUS; SUBCUTANEOUS at 05:13

## 2025-02-16 RX ADMIN — ONDANSETRON 4 MG: 4 TABLET, ORALLY DISINTEGRATING ORAL at 10:52

## 2025-02-16 RX ADMIN — ZIPRASIDONE HYDROCHLORIDE 40 MG: 40 CAPSULE ORAL at 21:39

## 2025-02-16 ASSESSMENT — ENCOUNTER SYMPTOMS
CHEST TIGHTNESS: 0
ALLERGIC/IMMUNOLOGIC NEGATIVE: 1
PALPITATIONS: 0
NEUROLOGICAL NEGATIVE: 1
SHORTNESS OF BREATH: 0
BLURRED VISION: 0
CHILLS: 0
NAUSEA: 0
HEADACHES: 0
CONSTIPATION: 0
LIGHT-HEADEDNESS: 0
BRUISES/BLEEDS EASILY: 0
WHEEZING: 0
SINUS PAIN: 0
ENDOCRINE NEGATIVE: 1
GASTROINTESTINAL NEGATIVE: 1
WEAKNESS: 1
CONSTITUTIONAL NEGATIVE: 1
PALPITATIONS: 1
EYES NEGATIVE: 1
ABDOMINAL PAIN: 0
FATIGUE: 0
SHORTNESS OF BREATH: 1
MUSCULOSKELETAL NEGATIVE: 1
FEVER: 0
HEMATOLOGIC/LYMPHATIC NEGATIVE: 1
MYALGIAS: 1
DIARRHEA: 0
COUGH: 0
DOUBLE VISION: 0
DIAPHORESIS: 0
PSYCHIATRIC NEGATIVE: 1
DIZZINESS: 0
RESPIRATORY NEGATIVE: 1
VOMITING: 0

## 2025-02-16 ASSESSMENT — COGNITIVE AND FUNCTIONAL STATUS - GENERAL
SUGGESTED CMS G CODE MODIFIER MOBILITY: CJ
HELP NEEDED FOR BATHING: A LITTLE
DAILY ACTIVITIY SCORE: 21
SUGGESTED CMS G CODE MODIFIER DAILY ACTIVITY: CJ
MOBILITY SCORE: 21
CLIMB 3 TO 5 STEPS WITH RAILING: A LOT
WALKING IN HOSPITAL ROOM: A LITTLE
TOILETING: A LITTLE
DRESSING REGULAR LOWER BODY CLOTHING: A LITTLE

## 2025-02-16 ASSESSMENT — PAIN DESCRIPTION - PAIN TYPE
TYPE: ACUTE PAIN

## 2025-02-16 ASSESSMENT — HEART SCORE
ECG: NON-SPECIFIC REPOLARIZATION DISTURBANCE
RISK FACTORS: >2 RISK FACTORS OR HX OF ATHEROSCLEROTIC DISEASE
TROPONIN: LESS THAN OR EQUAL TO NORMAL LIMIT
AGE: <45
HEART SCORE: 4
HISTORY: MODERATELY SUSPICIOUS

## 2025-02-16 NOTE — RESPIRATORY CARE
COPD EDUCATION by COPD CLINICAL EDUCATOR  2/16/2025 at 7:09 AM by Sulma Tate, RRT     Patient reviewed by COPD education team. Patient does not have a history or diagnosis of COPD and has never smoked. Therefore, patient does not qualify for the COPD program.   PFT 9/5/2019 confirms no obstruction mild restriction

## 2025-02-16 NOTE — CONSULTS
Cardiology Initial Consultation    Date of Service  2/16/2025    Referring Physician  Monica Fermin*    Reason for Consultation  Recurrent chest pain in the setting of recent abnormal stress test 2/6/2025, but with normal troponin    Assessment/Plan  #Chest pain in the setting of recent abnormal stress test  -She continues to have chest pain intermittently.  -EKG was without any signs of acute ischemia and troponin has remained negative upon serial repeat.  -Outpatient stress test 2/6/2025 was suggestive of apical wall and apical septum ischemia with an SDS of 6.  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  Engaged in a lengthy discussion of invasive angiogram versus cardiac CT.  She would like to proceed with invasive angiogram. The risks, benefits, and alternatives to coronary angiography with IV sedation were discussed in great detail. Specific risks mentioned include bleeding, infection, kidney damage, allergic reaction, cardiac perforation with possible tamponade requiring pericardiocentesis or possibly open heart surgery. In addition, we discussed that 10% of patients will experience small to moderate bruising at the site of the arterial puncture. Lastly, the risks of heart attack, stroke and death were discussed; the risk of major complications such as heart attack or stroke caused by the angiogram is approximately 1%; the risk of death is approximately 1 in 1000. The patient verbalized understanding of the potential complications and wishes to proceed with this procedure.   -Continue Ranexa 500 mg twice daily.  -Increase metoprolol to 50 mg twice daily.  -Start aspirin 81 mg daily.  -Continue nitro as needed.  -Keep NPO.  Plan for coronary angiogram later today.    History of Presenting Illness  Kristin Balderrama is a 34 y.o. female with a past medical history of POTS,  inappropriate sinus tachycardia s/p ablation in 2016, Sohan, PCOS with hirsutism, NPH normal pressure hydrocephalus, obesity and TONYA who presented 2/15/2025 with chest pain.    Her primary cardiologist is Dr. Leon.  She was most recently seen by Edwina Gold in the outpatient clinic on 2/11/2025.  At that exam, she was seen after numerous ED visits for continued chest pain.  A stress test was ordered which demonstrated apical ischemia and an SDS of 6.  She continued to have concerns of chest pain which was not alleviated by Tylenol or ibuprofen.  An EKG was performed which demonstrated sinus rhythm.  She was recommended for coronary angiogram, but did not wish to proceed with that at that time given her medical history of Annetta-Simi.  She was instead recommended to undergo CTA and start ranolazine 500 mg twice daily.    2/15/2025 she presented to the emergency department for evaluation of her chest pain.  She indicated that nature and were nausea and or helping with her symptoms, but then the day prior her chest pain had returned and was more severe in nature.  In the emergency department an EKG was performed which demonstrated normal sinus rhythm and no acute changes.  Troponin was negative.  She received 4 mg of morphine for her pain with improvement initially, but then had recurrence so she was admitted for observation.    Today, she reports continued intermittent chest pain.  It is described as a dull ache and occurs both upon exertion and at rest.  It also radiates into the carotids at times.  It has been alleviated with nitro and ranolazine in the past, but she notes that this was not working prior to admission.  She does have intermittent palpitations.  She is extremely concerned about her recurrent chest pain and does not feel comfortable returning home without further testing. No shortness of breath, dyspnea on exertion, orthopnea or PND. No lower extremity edema. No dizziness or  "lightheadedness. No syncope or presyncope.      Cardiovascular Risk Factors:  1. Smoking status: No  2. Type II Diabetes Mellitus: No   Lab Results   Component Value Date/Time    HBA1C 6.0 (A) 09/23/2024 12:11 PM    HBA1C 5.2 07/17/2023 07:56 AM     3. Hypertension: No  4. Dyslipidemia: Unknown No results found for: \"CHOLSTRLTOT\", \"LDL\", \"HDL\", \"TRIGLYCERIDE\"  5. Family history of early Coronary Artery Disease in a first degree relative (Male less than 55 years of age; Female less than 65 years of age): No, but Mother and grandmother do have heart disease.   6.  Obesity and/or Metabolic Syndrome: Yes  7. Sedentary lifestyle: Yes     Review of Systems  Review of Systems   Constitutional: Negative.  Negative for chills, diaphoresis, fatigue and fever.   HENT: Negative.     Eyes: Negative.    Respiratory: Negative.  Negative for cough, chest tightness and shortness of breath.    Cardiovascular:  Positive for chest pain and palpitations. Negative for leg swelling.   Gastrointestinal: Negative.  Negative for constipation, diarrhea, nausea and vomiting.   Endocrine: Negative.    Genitourinary: Negative.  Negative for decreased urine volume, difficulty urinating, dysuria and frequency.   Musculoskeletal: Negative.    Skin: Negative.    Allergic/Immunologic: Negative.    Neurological: Negative.  Negative for dizziness and light-headedness.   Hematological: Negative.  Does not bruise/bleed easily.   Psychiatric/Behavioral: Negative.         Past Medical History   has a past medical history of Abdominal pain, Anginal syndrome, Apnea, sleep, Arthritis, ASTHMA, Back pain, Borderline personality disorder ([facility]), Chickenpox, Chronic UTI (09/18/2010), Daytime sleepiness, Dental disorder (03/08/2021), Depression, Diabetes ([facility]), Diarrhea, Disorder of thyroid, Fatigue, Frequent headaches, Heart burn, History of falling, Inappropriate sinus tachycardia ([facility]) (2016), Migraine, Mitochondrial disease ([facility]), Multiple personality disorder " (HCC), Obesity, Pain, Painful joint, PCOS (polycystic ovarian syndrome), Pneumonia (2012 12/2020), POTS (postural orthostatic tachycardia syndrome), Psychosis (HCC), Ringing in ears, Scoliosis, Shortness of breath, Sinus tachycardia (10/31/2013), Sleep apnea, Snoring, Supraventricular tachycardia (HCC) (04/10/2019), Transverse myelitis (HCC), Tuberculosis, Urinary bladder disorder, Urinary incontinence, Weakness, and Wears glasses.    Surgical History   has a past surgical history that includes neuro dest facet l/s w/ig sngl (04/21/2015); recovery (01/27/2016); delmar by laparoscopy (08/29/2010); lumbar fusion anterior (08/21/2012); other cardiac surgery (01/2016); tonsillectomy; bowel stimulator insertion (07/13/2016); gastroscopy with balloon dilatation (N/A, 01/04/2017); muscle biopsy (Right, 01/26/2017); other abdominal surgery; laminotomy; bowel stimulator insertion (03/10/2021); pr lap,diagnostic abdomen (02/14/2022); ovarian cystectomy (Right, 02/14/2022); pr percut fix prox/neck femur fx (Left, 01/28/2023); inguinal hernia laparoscopic (Right, 07/21/2023); hernia repair (Right, 07/21/2023); pr cystoscopy,insert ureteral stent (Right, 2/12/2024); pr cysto/uretero/pyeloscopy, dx (Right, 2/12/2024); and lasertripsy (Right, 2/12/2024).    Family History  family history includes Genitourinary () Problems in her sister; Heart Disease in her maternal grandmother and mother; Hypertension in her maternal grandmother, maternal uncle, and mother; No Known Problems in her sister; Other in her mother and sister; Sleep Apnea in her mother.    Social History   reports that she has never smoked. She has never used smokeless tobacco. She reports that she does not drink alcohol and does not use drugs.    Medications  Prior to Admission Medications   Prescriptions Last Dose Informant Patient Reported? Taking?   Galcanezumab-gnlm (EMGALITY) 120 MG/ML Solution Auto-injector 1/27/2025 Evening Patient No No   Sig: Inject 1 mL  subcutaneously every month.   Melatonin 10 MG Tab 2/14/2025 Bedtime Patient Yes Yes   Sig: Take 10 mg by mouth at bedtime.   Rimegepant Sulfate (NURTEC) 75 MG TABLET DISPERSIBLE 1/29/2025 Morning Patient No No   Sig: Take 1 tablet by mouth at onset of migraine or aura okay to repeat in 24 hours if needed.   amLODIPine (NORVASC) 5 MG Tab 2/15/2025 Morning Patient No Yes   Sig: Take 1 tablet by mouth every day.   diphenhydrAMINE (BENADRYL) 25 MG Tab 2/14/2025 Bedtime Patient Yes Yes   Sig: Take 50 mg by mouth at bedtime.   docusate sodium (COLACE) 250 MG capsule 2/15/2025 Morning  Yes Yes   Sig: Take 250 mg by mouth 2 times a day.   gabapentin (NEURONTIN) 400 MG Cap 2/15/2025 Morning Patient No Yes   Sig: TAKE 3 CAPSULES BY MOUTH THREE TIMES DAILY.   ivabradine (CORLANOR) 7.5 MG Tab tablet 2/15/2025 Morning Patient No Yes   Sig: Take 1 Tablet by mouth 2 times a day with meals.   lamoTRIgine (LAMICTAL) 25 MG Tab 2/15/2025 Morning Patient No Yes   Sig: Take 2 Tablets by mouth 2 times a day.   metoprolol SR (TOPROL XL) 25 MG TABLET SR 24 HR 2/15/2025 Morning Patient No Yes   Sig: Take 1 tablet by mouth 2 times a day.   nitroglycerin (NITROSTAT) 0.4 MG SL Tab Unknown Patient No No   Sig: Place 1 Tablet under the tongue as needed for Chest Pain (may repeat for chest pain every 5 mins not to exceed 3 doses).   omeprazole (PRILOSEC) 20 MG delayed-release capsule 2/15/2025 Morning Patient No Yes   Sig: Take 2 Capsules by mouth 2 times a day.   ranolazine (RANEXA) 500 MG TABLET SR 12 HR 2/15/2025 Morning Patient No Yes   Sig: Take 1 Tablet by mouth 2 times a day.   scopolamine (TRANSDERM SCOP, 1.5 MG,) 1 mg/72hr PATCH 72 HR Unknown Patient No No   Sig: Place 1 Patch on the skin every 72 hours.   spironolactone (ALDACTONE) 50 MG Tab 2/15/2025 Morning Patient No Yes   Sig: Take 1 Tablet by mouth every day.   sumatriptan (IMITREX) 20 MG/ACT nasal spray Unknown Patient No No   Sig: Give 1 dose at onset headache okay to repeat in 2  "hours if needed for max of 2 doses in a 24 hour timeframe.   tizanidine (ZANAFLEX) 4 MG Tab 2/14/2025 Bedtime Patient Yes Yes   Sig: Take 4 mg by mouth at bedtime.   topiramate (TOPAMAX) 50 MG tablet 2/15/2025 Morning Patient No Yes   Sig: Take 1 Tablet by mouth 2 times a day.   ziprasidone (GEODON) 40 MG Cap 2/14/2025 Bedtime Patient No Yes   Sig: Take 1 capsule by mouth at bedtime.      Facility-Administered Medications: None       Allergies  Allergies   Allergen Reactions    Cefdinir Shortness of Breath and Itching     Tolerated 1/18/17  Tolerates ceftriaxone  Tolerated augmentin 8/2019     Depakote [Divalproex Sodium] Unspecified     Muscle spasms/muscle pain and weakness      Doxycycline Anaphylaxis and Vomiting     Other reaction(s): pustules/blisters  Other reaction(s): stomach pain    Montelukast [Singulair] Unspecified     Cardiac effusion    Vancomycin Itching     Pt becomes flushed in face and chest.   RXN=7/10/16    Wound Dressing Adhesive Rash     By pt report-\"removes skin totally off\"    Amitriptyline Unspecified     Headaches      Amoxicillin Rash      Tolerates augmentin    Aripiprazole [Abilify] Unspecified     Headaches/muscle twitching      Clindamycin Nausea         Other reaction(s): nausea stomach pain    Erythromycin Rash     .  Other reaction(s): nausea stomach pain    Flomax [Tamsulosin Hydrochloride] Swelling    Hydromorphone      Other reaction(s): vomiting    Levaquin Unspecified     Severe muscle cramps in legs  RXN=unknown  Other reaction(s): leg muscle cramps    Metformin Unspecified     Increased lactic acid     Other reaction(s): itching and rash/nausea vomiting    Sulfa Drugs Hives, Itching, Myalgia and Unspecified     Muscle pain and weakness    Other reaction(s): unknown    Tamsulosin Swelling     Swelling of legs    Tape Rash     Tears skin off  coban with Tegaderm tape ok intermittently  RXN=ongoing    Sulfamethoxazole W-Trimethoprim Rash    Ciprofloxacin Rash          Keflex " "Rash     Pt states she gets a rash with this medication  Tolerates ceftriaxone  Can take with Benadryl    Levofloxacin Unspecified     Leg muscle cramps    Metronidazole Rash     \"Vision problems\"  Other reaction(s): vision problems       Vital signs in last 24 hours  Temp:  [35.9 °C (96.6 °F)-36.5 °C (97.7 °F)] 36.4 °C (97.6 °F)  Pulse:  [68-84] 84  Resp:  [12-20] 16  BP: (112-167)/(57-89) 115/57  SpO2:  [90 %-97 %] 90 %    Physical Exam  Physical Exam  Vitals and nursing note reviewed.   Constitutional:       General: She is not in acute distress.     Appearance: Normal appearance. She is not toxic-appearing.   HENT:      Head: Normocephalic and atraumatic.      Right Ear: External ear normal.      Left Ear: External ear normal.      Nose: Nose normal.      Mouth/Throat:      Mouth: Mucous membranes are moist.      Pharynx: Oropharynx is clear.   Eyes:      General: No scleral icterus.     Extraocular Movements: Extraocular movements intact.      Conjunctiva/sclera: Conjunctivae normal.      Pupils: Pupils are equal, round, and reactive to light.   Neck:      Vascular: No JVD.   Cardiovascular:      Rate and Rhythm: Normal rate and regular rhythm.      Pulses: Normal pulses.      Heart sounds: Normal heart sounds. No murmur heard.     No friction rub. No gallop.   Pulmonary:      Effort: Pulmonary effort is normal.      Breath sounds: Normal breath sounds.   Abdominal:      General: Abdomen is flat. Bowel sounds are normal. There is no distension.      Palpations: Abdomen is soft.   Musculoskeletal:         General: Normal range of motion.      Cervical back: Normal range of motion and neck supple.      Right lower leg: No edema.      Left lower leg: No edema.   Skin:     General: Skin is warm and dry.      Capillary Refill: Capillary refill takes less than 2 seconds.      Coloration: Skin is not jaundiced.   Neurological:      General: No focal deficit present.      Mental Status: She is alert and oriented to " person, place, and time.   Psychiatric:         Mood and Affect: Mood normal.         Behavior: Behavior normal.         Lab Review  Lab Results   Component Value Date/Time    WBC 7.0 02/15/2025 12:44 PM    WBC 6.1 07/20/2010 11:00 AM    RBC 4.83 02/15/2025 12:44 PM    RBC 4.38 07/20/2010 11:00 AM    HEMOGLOBIN 15.1 02/15/2025 12:44 PM    HEMATOCRIT 44.2 02/15/2025 12:44 PM    MCV 91.5 02/15/2025 12:44 PM    MCV 93 07/20/2010 11:00 AM    MCH 31.3 02/15/2025 12:44 PM    MCH 30.1 07/20/2010 11:00 AM    MCHC 34.2 02/15/2025 12:44 PM    MPV 11.3 02/15/2025 12:44 PM      Lab Results   Component Value Date/Time    SODIUM 138 02/16/2025 06:14 AM    POTASSIUM 4.2 02/16/2025 06:14 AM    CHLORIDE 110 02/16/2025 06:14 AM    CO2 16 (L) 02/16/2025 06:14 AM    GLUCOSE 129 (H) 02/16/2025 06:14 AM    BUN 13 02/16/2025 06:14 AM    CREATININE 0.85 02/16/2025 06:14 AM    CREATININE 0.75 (L) 07/20/2010 11:00 AM    BUNCREATRAT 20.0 01/22/2025 05:10 PM    BUNCREATRAT 19 07/20/2010 11:00 AM    GLOMRATE >59 07/20/2010 11:00 AM      Lab Results   Component Value Date/Time    ASTSGOT 26 02/15/2025 12:44 PM    ALTSGPT 41 02/15/2025 12:44 PM     Lab Results   Component Value Date/Time    CHOLSTRLTOT 198 09/30/2024 07:14 AM     (H) 09/30/2024 07:14 AM    HDL 44 09/30/2024 07:14 AM    TRIGLYCERIDE 175 (H) 09/30/2024 07:14 AM    TROPONINT <6 02/15/2025 08:20 PM       Recent Labs     02/15/25  1244   NTPROBNP 82       Please contact me with any questions.    Thank you for allowing me to participate in the care of Kristin Balderrama .    Tatyana Dykes PA-C, Cardiology  [facility], [facility].  [address], [place] [address]  [place] [address]  Phone: [phone]  Fax: [phone]    PLEASE NOTE: This note was created using voice recognition software. I have made every reasonable attempt to correct obvious errors, but I expect that there are errors of grammar and possibly  content that I did not discover before finalizing the note.

## 2025-02-16 NOTE — PROGRESS NOTES
4 Eyes Skin Assessment Completed by Olga RN and SONYA Hedrick.    Head WDL  Ears WDL  Nose WDL  Mouth WDL  Neck WDL  Breast/Chest WDL  Shoulder Blades WDL  Spine WDL  (R) Arm/Elbow/Hand WDL  (L) Arm/Elbow/Hand WDL  Abdomen WDL  Groin WDL  Scrotum/Coccyx/Buttocks Redness and Blanching  (R) Leg WDL  (L) Leg WDL  (R) Heel/Foot/Toe WDL  (L) Heel/Foot/Toe WDL          Devices In Places Tele Box      Interventions In Place N/A    Possible Skin Injury No    Pictures Uploaded Into Epic N/A  Wound Consult Placed N/A  RN Wound Prevention Protocol Ordered No

## 2025-02-16 NOTE — CARE PLAN
The patient is Watcher - Medium risk of patient condition declining or worsening    Shift Goals  Clinical Goals: pain control, tele monitoring, cards consult  Patient Goals: pain control  Family Goals: no family present    Progress made toward(s) clinical / shift goals:    Problem: Pain - Standard  Goal: Alleviation of pain or a reduction in pain to the patient’s comfort goal  Description: Target End Date:  Prior to discharge or change in level of care    Document on Vitals flowsheet    1.  Document pain using the appropriate pain scale per order or unit policy  2.  Educate and implement non-pharmacologic comfort measures (i.e. relaxation, distraction, massage, cold/heat therapy, etc.)  3.  Pain management medications as ordered  4.  Reassess pain after pain med administration per policy  5.  If opiods administered assess patient's response to pain medication is appropriate per POSS sedation scale  6.  Follow pain management plan developed in collaboration with patient and interdisciplinary team (including palliative care or pain specialists if applicable)  Outcome: Progressing     Problem: Knowledge Deficit - Standard  Goal: Patient and family/care givers will demonstrate understanding of plan of care, disease process/condition, diagnostic tests and medications  Description: Target End Date:  1-3 days or as soon as patient condition allows    Document in Patient Education    1.  Patient and family/caregiver oriented to unit, equipment, visitation policy and means for communicating concern  2.  Complete/review Learning Assessment  3.  Assess knowledge level of disease process/condition, treatment plan, diagnostic tests and medications  4.  Explain disease process/condition, treatment plan, diagnostic tests and medications  Outcome: Progressing       Patient is not progressing towards the following goals:

## 2025-02-16 NOTE — THERAPY
Physical Therapy Contact Note    Patient Name: Kristin Balderrama  Age:  34 y.o., Sex:  female  Medical Record #: [id number]  Today's Date: 2/16/2025    PT consult received, pt is pending cardiac consult for possible cath. Confirmed with charge nurse. Will hold PT eval and f/u as able/appropriate once medical POC is established.

## 2025-02-16 NOTE — DIETARY
Nutrition Services: Cardiac Education Consult   Day 0 of admit.  Kristin Balderrama is a 34 y.o. female with admitting DX of Acute chest pain     RD received consult for heart healthy diet education. RD included nutrition recommendations and tips in dietary discharge instructions that align with pt's current dx. Outpatient resources included to encourage follow-up with [facility] for continued support after discharge.     Pt with BMI >40 (=Body mass index is 44.96 kg/m².), morbid obesity. Weight loss counseling not appropriate in acute care setting.     RECOMMEND - If appropriate at DC please refer to outpatient nutrition services for weight management.      No other education needs identified at this time. Please consult RD as needed for supplemental education or at request of pt.

## 2025-02-16 NOTE — PROGRESS NOTES
Daily Progress Note    Date of Service  2/16/2025    Chief Complaint  Kristin Balderrama is a 34 y.o. female admitted 2/15/2025 with chest pain, nausea and vomiting.    Hospital Course  Ms. Kristin Balderrama is a 34 y.o. femalewith a pmh of POTS, Annetta-Danlos, migraines, thyroid disorder, inappropriate sinus tachycardia, PCOS with hirsutism and recurrent noncardiac chest pain who presented 2/15/2025 with complaints of worsening chest pain.     Patient reports left-sided chest pressure that radiated to her jaw since yesterday. She also reports some nausea, as well as some mild lower extremity edema. She denies dizziness, lightheadedness, shortness of breath or palpitations. The patient says she experiences more chest pain when she is under stress. She says she is had a significant amount of stress at work lately.   Patient has recurrent chest pain and has been seen multiple occasions, says she was following with cardiologist Dr Leon, but he retired and she has yet to establish care with another cardiologist. She recently had an abnormal stress test on 2/7/25. Cardiac catheterization was apparently considered, but patient did not want to proceed with this at that time due to history of Annetta-Danlos syndrome and concern of vascular injury due to the procedure itself, as she apparently had when she underwent an ablation in the past. Patient says she is uncomfortable being discharged from the ER due to her ongoing chest pain. She reports taking Ranexa as prescribed, as well as the remainder of her medications. She denies recent cold flulike symptoms, cough, congestion, shortness of breath or pleuritic pain. She denies G.I. or  complaints. ERP discussed case with cardiologist. No formal consultation necessary at this time. However, cardiology is available if patient condition changes or if any abnormal test results are found.     EKG is normal sinus rhythm, 78 BPM, no acute ST-T changes.  Chest x-ray  negative for acute findings. Troponin and BNP normal. CBC and CMP are unremarkable.  Patient admitted to hospital medicine for management of care.    During this hospitalization, cardiology Dr. Jeff was consulted.  Due to noted positive stress test obtained on 2/6/2025, cardiology team feels that they should pursue a cardiac angiogram to rule out a cardiac event.  Patient had a CTA heart ordered as an outpatient, however, we will pursue inpatient angiogram to determine a diagnosis.    Interval Problem Update  Patient was assessed at the bedside patient reports denies CP due to recent medication administration. She denies SOB, dizziness or other overnight events. Current vitals are WNL, cardiology is at the bedside recommending cardiac angiogram, continued management of CP and other health issues.   Plan of care: Patient will be transitioned to inpatient due to anticipated 2 midnights due to future cardiac angiogram.     I have discussed this patient's plan of care and discharge plan at IDT rounds today with Case Management, Nursing, Nursing leadership, and other members of the IDT team.    Consultants/Specialty  none    Code Status  Full Code    Disposition  <MEDICALLYCLEARED>  I have placed the appropriate orders for post-discharge needs.    Review of Systems  Review of Systems   Constitutional:  Negative for chills and fever.   HENT:  Negative for congestion, hearing loss, sinus pain and tinnitus.    Eyes:  Negative for blurred vision and double vision.   Respiratory:  Negative for cough, shortness of breath and wheezing.    Cardiovascular:  Negative for chest pain and palpitations.   Gastrointestinal:  Negative for abdominal pain, nausea and vomiting.   Genitourinary: Negative.    Musculoskeletal: Negative.    Skin: Negative.    Neurological: Negative.  Negative for dizziness and headaches.   Psychiatric/Behavioral: Negative.          Physical Exam  Temp:  [35.9 °C (96.6 °F)-36.5 °C (97.7 °F)] 36.4 °C (97.5  °F)  Pulse:  [68-81] 77  Resp:  [12-20] 15  BP: (112-167)/() 123/62  SpO2:  [93 %-97 %] 94 %    Physical Exam  Constitutional:       Appearance: Normal appearance. She is obese. She is ill-appearing.   HENT:      Head: Normocephalic and atraumatic.      Nose: Nose normal.      Mouth/Throat:      Mouth: Mucous membranes are moist.      Pharynx: Oropharynx is clear.   Eyes:      Extraocular Movements: Extraocular movements intact.      Pupils: Pupils are equal, round, and reactive to light.   Cardiovascular:      Rate and Rhythm: Normal rate and regular rhythm.      Heart sounds: No murmur heard.     No friction rub. No gallop.   Pulmonary:      Effort: Pulmonary effort is normal. No respiratory distress.      Breath sounds: Normal breath sounds. Stridor present. No wheezing, rhonchi or rales.   Chest:      Chest wall: No tenderness.   Abdominal:      General: Bowel sounds are normal.      Palpations: Abdomen is soft.   Musculoskeletal:         General: Normal range of motion.      Cervical back: Normal range of motion and neck supple.   Skin:     General: Skin is warm and dry.      Capillary Refill: Capillary refill takes less than 2 seconds.   Neurological:      General: No focal deficit present.      Mental Status: She is alert and oriented to person, place, and time.         Fluids    Intake/Output Summary (Last 24 hours) at 2/16/2025 1057  Last data filed at 2/15/2025 2000  Gross per 24 hour   Intake 240 ml   Output --   Net 240 ml        Laboratory  Recent Labs     02/15/25  1244   WBC 7.0   RBC 4.83   HEMOGLOBIN 15.1   HEMATOCRIT 44.2   MCV 91.5   MCH 31.3   MCHC 34.2   RDW 40.0   PLATELETCT 234   MPV 11.3     Recent Labs     02/15/25  1244 02/16/25  0614   SODIUM 139 138   POTASSIUM 3.9 4.2   CHLORIDE 109 110   CO2 16* 16*   GLUCOSE 134* 129*   BUN 13 13   CREATININE 0.84 0.85   CALCIUM 9.8 8.9                   Imaging  DX-CHEST-PORTABLE (1 VIEW)   Final Result      No evidence of acute cardiopulmonary  process.           Assessment/Plan   Acute chest pain- (present on admission)  Assessment & Plan  History of recent abnormal stress test. EKG without acute ST T changes. Troponin normal  - telemonitor  -cardiac angigram  - sublingual Nitro for chest pain  - continue Ranexa  - cardiology will be consulted if there is any change in condition or abnormal findings.     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 In review of yesterday's note (2/15/2025), there are no changes except as documented above.

## 2025-02-16 NOTE — DISCHARGE INSTR - DIET
Heart-Healthy Nutrition Therapy    A heart-healthy diet is recommended to reduce your unhealthy blood cholesterol levels, manage high blood pressure, and lower your risk for heart disease.    To follow a heart-healthy diet,    Eat a balanced diet with whole grains, fruits and vegetables, and lean protein sources.  Achieve and maintain a healthy weight.  Choose heart-healthy unsaturated fats. Limit saturated fats, trans fats, and cholesterol intake. Eat more plant-based or vegetarian meals using beans and soy foods for protein.  Eat whole, unprocessed foods to limit the amount of sodium (salt) you eat.  Limit refined carbohydrates especially sugar, sweets and sugar-sweetened beverages.  If you drink alcohol, do so in moderation: one serving per day (women) and two servings per day (men).  One serving is equivalent to 12 ounces beer, 5 ounces wine, or 1.5 ounces distilled spirits    Tips for Choosing Heart-Healthy Fats    Choose lean protein and low-fat dairy foods to reduce saturated fat intake.    Saturated fat is usually found in animal-based protein and is associated with certain health risks. Saturated fat is the biggest contributor to raised low-density lipoprotein (LDL) cholesterol levels in the diet. Research shows that limiting saturated fat lowers unhealthy cholesterol levels. Eat no more than 5-6% of your total calories each day from saturated fat. Ask your registered dietitian nutritionist (RDN) to help you determine how much saturated fat is right for you.  There are many foods that do not contain large amounts of saturated fats. Swapping these foods to replace foods high in saturated fats will help you limit the saturated fat you eat and improve your cholesterol levels. You can also try eating more plant-based or vegetarian meals.        Avoid trans fats    Trans fats increase levels of LDL-cholesterol. Hydrogenated fat in processed foods is the main source of trans fats in foods.   Trans fats can be  found in stick margarine, shortening, processed sweets, baked goods, some fried foods, and packaged foods made with hydrogenated oils. Avoid foods with “partially hydrogenated oil” on the ingredient list such as: cookies, pastries, baked goods, biscuits, crackers, microwave popcorn, and frozen dinners.    Choose foods with heart healthy fats    Polyunsaturated and monounsaturated fat are unsaturated fats that may help lower your blood cholesterol level when used in place of saturated fat in your diet.  Ask your RDN about taking a dietary supplement with plant sterols and stanols to help lower your cholesterol level.  Research shows that substituting saturated fats with unsaturated fats is beneficial to cholesterol levels. Try these easy swaps:      Limit the amount of cholesterol you eat to less than 200 milligrams per day.    Cholesterol is a substance carried through the bloodstream via lipoproteins, which are known as “transporters” of fat. Some body functions need cholesterol to work properly, but too much cholesterol in the bloodstream can damage arteries and build up blood vessel linings (which can lead to heart attack and stroke). You should eat less than 200 milligrams cholesterol per day.  People respond differently to eating cholesterol. There is no test available right now that can figure out which people will respond more to dietary cholesterol and which will respond less. For individuals with high intake of dietary cholesterol, different types of increase (none, small, moderate, large) in LDL-cholesterol levels are all possible.    Food sources of cholesterol include egg yolks and organ meats such as liver, gizzards.  Limit egg yolks to two to four per week and avoid organ meats like liver and gizzards to control cholesterol intake.    Tips for Choosing Heart-Healthy Carbohydrates    Consume foods rich in viscous (soluble) fiber    Viscous, or soluble, fiber is found in the walls of plant cells. Viscous  fiber is found only in plant-based foods--animal-based foods like meat or dairy products do not contain fiber. In the stomach, viscous fibers absorb water and swell to form a thick, jelly-like mass. This helps to lower your unhealthy cholesterol  Rich sources of viscous fiber include asparagus, [place] sprouts, sweet potatoes, turnips, apricots, mangoes, oranges, legumes, barley, oats, and oat bran.  Eat at least 5 to 10 grams of viscous fiber each day. As you increase your fiber intake gradually, also increase the amount of water you drink. This will help prevent constipation.  If you have difficulty achieving this goal, ask your RDN about fiber laxatives. Choose fiber supplements made with viscous fibers such as psyllium seed husks or methylcellulose to help lower unhealthy cholesterol.    Limit refined carbohydrates    There are three types of carbohydrates: starches, sugar, and fiber. Some carbohydrates occur naturally in food, like the starches in rice or corn or the sugars in fruits and milk. Refined carbohydrates--foods with high amounts of simple sugars--can raise triglyceride levels. High triglyceride levels are associated with coronary heart disease.  Some examples of refined carbohydrate foods are table sugar, sweets, and beverages sweetened with added sugar.    Tips for Reducing Sodium (Salt)  Although sodium is important for your body to function, too much sodium can be harmful for people with high blood pressure.  As sodium and fluid buildup in your tissues and bloodstream, your blood pressure increases. High blood pressure may cause damage to other organs and increase your risk for a stroke.    Keep your salt intake to 2300 milligrams or less per day. Even if you take a pill for blood pressure or a water pill (diuretic) to remove fluid, it is still important to have less salt in your diet. Ask your RDN what amount of sodium is right for you.    Avoid processed foods.  Eat more fresh foods.  Fresh  "fruits and vegetables are naturally low in sodium, as well as frozen vegetables and fruits that have no added juices or sauces.  Fresh meats are lower in sodium than processed meats, such as cabezas, sausage, and hotdogs.  Read the nutrition label or ask your  to help you find a fresh meat that is low in sodium.  Eat less salt--at the table and when cooking.  A single teaspoon of table salt has 2,300 mg of sodium.  Leave the salt out of recipes for pasta, casseroles, and soups.  Ask your RDN how to cook your favorite recipes without sodium  Be a smart .  Look for food packages that say “salt-free” or “sodium-free.” These items contain less than 5 milligrams of sodium per serving.  “Very low-sodium” products contain less than 35 milligrams of sodium per serving.  “Low-sodium” products contain less than 140 milligrams of sodium per serving.   Beware of “reduced salt” or \"reduced sodium\" products.  These items may still be high in sodium. Check the nutrition label.   Add flavors to your food without adding sodium.  Try lemon juice, lime juice, fruit juice or vinegar.    Dry or fresh herbs add flavor. Try basil, bay leaf, dill, rosemary, parsley, madyson, dry mustard, nutmeg, thyme, and paprika.  Pepper, red pepper flakes, and cayenne pepper can add spice to your meals without adding sodium. Hot sauce contains sodium, but if you use just a drop or two, it will not add up to much.  Buy a sodium-free seasoning blend or make your own at home.     Additional Lifestyle Tips    Achieve and maintain a healthy weight.  Talk with your RDN or your doctor about what is a healthy weight for you.  Set goals to reach and maintain that weight.   To lose weight, reduce your calorie intake along with increasing your physical activity. A weight loss of 10 to 15 pounds could reduce LDL-cholesterol by 5 milligrams per deciliter.  Participate in physical activity.    Talk with your health care team to find out what types of " physical activity are best for you. Set a plan to get about 30 minutes of exercise on most days.          Nutrition Counseling  Our expert team offers:   Medical Nutrition Therapy for Chronic Conditions   Weight Management   Diabetes Education and Management   Wellness Services   Body Composition Measurements   Gastrointestinal Health    [facility] are located at:  [address], [place] [address]  For more information and to schedule a consultation, please call [phone].  A physician referral may be required by your insurance for coverage.

## 2025-02-16 NOTE — CARE PLAN
The patient is Stable - Low risk of patient condition declining or worsening    Shift Goals  Clinical Goals: Pain control, tele monitoring  Patient Goals: Pain control, go home  Family Goals: JODI    Progress made toward(s) clinical / shift goals:    Problem: Pain - Standard  Goal: Alleviation of pain or a reduction in pain to the patient’s comfort goal  Description: Target End Date:  Prior to discharge or change in level of care    Document on Vitals flowsheet    1.  Document pain using the appropriate pain scale per order or unit policy  2.  Educate and implement non-pharmacologic comfort measures (i.e. relaxation, distraction, massage, cold/heat therapy, etc.)  3.  Pain management medications as ordered  4.  Reassess pain after pain med administration per policy  5.  If opiods administered assess patient's response to pain medication is appropriate per POSS sedation scale  6.  Follow pain management plan developed in collaboration with patient and interdisciplinary team (including palliative care or pain specialists if applicable)  Outcome: Progressing     Problem: Knowledge Deficit - Standard  Goal: Patient and family/care givers will demonstrate understanding of plan of care, disease process/condition, diagnostic tests and medications  Description: Target End Date:  1-3 days or as soon as patient condition allows    Document in Patient Education    1.  Patient and family/caregiver oriented to unit, equipment, visitation policy and means for communicating concern  2.  Complete/review Learning Assessment  3.  Assess knowledge level of disease process/condition, treatment plan, diagnostic tests and medications  4.  Explain disease process/condition, treatment plan, diagnostic tests and medications  Outcome: Progressing       Patient is not progressing towards the following goals:

## 2025-02-16 NOTE — PROGRESS NOTES
Brief Cardiology Note:    I was called to discuss this patients care with Dr. Orellana. We discussed 34-year-old female followed by [facility] cardiology for postural orthostatic tachycardia syndrome, inappropriate sinus tachycardia and recurrent noncardiac chest pain.  Patient also has morbid obesity with BMI of 45.79.    Patient was seen in cardiology on 1/3/25 for follow-up of chest pain from the emergency room.  Nuclear stress test was ordered at the time.  Nuclear stress test dated 2/6/2025 reported decreased uptake in the apical wall apical septal wall possible ischemia normal ejection fraction and wall motion.  Patient was seen in cardiology clinic 2/11/2025 to discuss the abnormal results.  At that time, patient did not want to move forward with invasive procedure.  Discussion of also cardiac CTA is a possibility.  It was left undecided.  Ranolazine was started along with sublingual nitro.    Patient comes into the emergency room with complaints of chest pain.  Troponins have been negative.  ECG reviewed shows sinus rhythm, nonspecific T wave changes no significant change from prior.    Per Dr. Orellana, patient does not feel comfortable to being discharged home and would like further discussions about potential cardiac catheterization or further workup.      Cardiology will see patient in the morning.  Please consult overnight if anything acute occurs.    Electronically Signed by:  Esperanza Jeff MD  2/15/2025  7:00 PM

## 2025-02-16 NOTE — ED NOTES
"Med rec updated and complete. Allergies reviewed.      Pt confirmed name and date of birth.    Pt denies anticoagulant medications.    Recently finished a course of Augmentin 875-125 mg and clindamycin 300 mg.   Pt stated it has been for \"ongoing: ear infections\".    Preferred Pharmacy  [facility] = [phone]  [facility] = [phone]  "

## 2025-02-16 NOTE — PROGRESS NOTES
42 year old female with HTN, portal hypertension, EtOH abuse, liver cirrhosis, Hep C [place] [facility] [place] bleed. Patient s/p blood transfusion with 2 units of PRBC in ER. Hgb was 3.8 following transfusion. She is receiving 3rd PRBC at present.  Patient also presents with SUNG and creatinine of 2.1 and metabolic acidosis.    Nephrology was consulted to help with patient's renal care while she is admitted at [facility]. I saw and examined patient in her hospital room. Patient reports that SOB and weakness have improved. She reports diarrhea yesterday. No nausea/vomiting. No pain at present, no LE edema. She made about 150 cc of urine overnight (recorded).     Chart review revealed creatinine of 0.8 on 4/15/19.    Pt arrived to unit via wheelchair at 1950. Pt oriented to room, unit, and plan of care. Tele-monitor placed and monitor room notified. All questions answered at this time. Call light within reach; fall precautions in place.  VSS, AAOx4

## 2025-02-16 NOTE — PROGRESS NOTES
[facility] Medicine Daily Progress Note    Date of Service  2/16/2025    Chief Complaint  Kristin Balderrama is a 34 y.o. female admitted 2/15/2025 with chest pain    Hospital Course  Ms. Kristin Balderrama is a 34 y.o. femalewith a pmh of POTS, Annetta-Danlos, migraines, thyroid disorder, inappropriate sinus tachycardia, PCOS with hirsutism and recurrent noncardiac chest pain who presented 2/15/2025 with complaints of worsening chest pain.     Patient reports left-sided chest pressure that radiated to her jaw since yesterday. She also reports some nausea, as well as some mild lower extremity edema. She denies dizziness, lightheadedness, shortness of breath or palpitations. The patient says she experiences more chest pain when she is under stress. She says she is had a significant amount of stress at work lately.   Patient has recurrent chest pain and has been seen multiple occasions, says she was following with cardiologist Dr Leon, but he retired and she has yet to establish care with another cardiologist. She recently had an abnormal stress test on 2/7/25. Cardiac catheterization was apparently considered, but patient did not want to proceed with this at that time due to history of Annetta-Danlos syndrome and concern of vascular injury due to the procedure itself, as she apparently had when she underwent an ablation in the past. Patient says she is uncomfortable being discharged from the ER due to her ongoing chest pain. She reports taking Ranexa as prescribed, as well as the remainder of her medications. She denies recent cold flulike symptoms, cough, congestion, shortness of breath or pleuritic pain. She denies G.I. or  complaints. ERP discussed case with cardiologist. No formal consultation necessary at this time. However, cardiology is available if patient condition changes or if any abnormal test results are found.     EKG is normal sinus rhythm, 78 BPM, no acute ST-T changes.  Chest x-ray negative  for acute findings. Troponin and BNP normal. CBC and CMP are unremarkable.  Patient admitted to hospital medicine for management of care.    During this hospitalization, cardiology Dr. Jeff was consulted.  Due to noted positive stress test obtained on 2/6/2025, cardiology team feels that they should pursue a cardiac angiogram to rule out a cardiac event.  Patient had a CTA heart ordered as an outpatient, however, we will pursue inpatient angiogram to determine a diagnosis.    Interval Problem Update  Patient seen and examined.  Currently, patient is not having any chest pain, however, patient described her most recent chest pain for the admission as the worst chest pain she has had.  Cardiology NP at bedside and discussed plan of care with patient.  At this time, per cardiology we will pursue a cardiac angiogram due to concerning stress test obtained on 2/6/2025.  Plan of care: Continue telemetry monitoring; continue to monitor chest pain; prep patient for angiogram which is likely going to happen tomorrow 2/17; await further recommendations from cardiology post angiogram  Disposition: Patient switched to inpatient status as she is anticipated to stay 2-3 midnights for management of chest pain, ACS rule out  Lab work: Reviewed; expected  VSS at this time    I have discussed this patient's plan of care and discharge plan at IDT rounds today with Case Management, Nursing, Nursing leadership, and other members of the IDT team.    Consultants/Specialty  cardiology - Dr. Jeff    Code Status  Full Code    Disposition  The patient is not medically cleared for discharge to home or a post-acute facility.  Anticipate discharge to: home with close outpatient follow-up    I have placed the appropriate orders for post-discharge needs.    Review of Systems  Review of Systems   Constitutional:  Positive for malaise/fatigue. Negative for chills and fever.   HENT: Negative.     Eyes: Negative.    Respiratory:  Positive for shortness  of breath.    Cardiovascular:  Positive for chest pain.   Gastrointestinal: Negative.    Genitourinary: Negative.    Musculoskeletal:  Positive for myalgias.   Skin: Negative.    Neurological:  Positive for weakness.   Endo/Heme/Allergies: Negative.    Psychiatric/Behavioral: Negative.          Physical Exam  Temp:  [35.9 °C (96.6 °F)-36.5 °C (97.7 °F)] 36.4 °C (97.5 °F)  Pulse:  [68-81] 77  Resp:  [12-20] 15  BP: (112-167)/() 123/62  SpO2:  [93 %-97 %] 94 %    Physical Exam  Vitals and nursing note reviewed.   Constitutional:       Appearance: She is obese.   HENT:      Head: Normocephalic.      Mouth/Throat:      Mouth: Mucous membranes are moist.      Pharynx: Oropharynx is clear.   Eyes:      Pupils: Pupils are equal, round, and reactive to light.   Cardiovascular:      Rate and Rhythm: Normal rate.      Pulses: Normal pulses.      Heart sounds: Normal heart sounds.   Pulmonary:      Effort: Pulmonary effort is normal.      Breath sounds: Normal breath sounds.   Abdominal:      General: Bowel sounds are normal.      Palpations: Abdomen is soft.   Musculoskeletal:         General: Tenderness present.      Cervical back: Normal range of motion.   Skin:     Capillary Refill: Capillary refill takes 2 to 3 seconds.      Coloration: Skin is pale.   Neurological:      Mental Status: She is alert. Mental status is at baseline.      Motor: Weakness present.         Fluids    Intake/Output Summary (Last 24 hours) at 2/16/2025 1021  Last data filed at 2/15/2025 2000  Gross per 24 hour   Intake 240 ml   Output --   Net 240 ml        Laboratory  Recent Labs     02/15/25  1244   WBC 7.0   RBC 4.83   HEMOGLOBIN 15.1   HEMATOCRIT 44.2   MCV 91.5   MCH 31.3   MCHC 34.2   RDW 40.0   PLATELETCT 234   MPV 11.3     Recent Labs     02/15/25  1244 02/16/25  0614   SODIUM 139 138   POTASSIUM 3.9 4.2   CHLORIDE 109 110   CO2 16* 16*   GLUCOSE 134* 129*   BUN 13 13   CREATININE 0.84 0.85   CALCIUM 9.8 8.9                    Imaging  DX-CHEST-PORTABLE (1 VIEW)   Final Result      No evidence of acute cardiopulmonary process.           Assessment/Plan  * Acute chest pain- (present on admission)  Assessment & Plan  History of recent abnormal stress test. EKG without acute ST T changes. Troponin normal  ERP discussed case with cardiology  - telemonitor  - trend troponin  - echocardiogram  - sublingual Nitro for chest pain  - continue Ranexa  - cardiology -Dr. Jeff consulted due to; noted positive stress test on 2/6/2025, cardiology team feels that they should pursue a cardiac angiogram during this admission.;  Patient will be prepped for [facility] on 2/17.    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)  Assessment & Plan  - Noted  -Likely contributory to her chest pain as she has had multiple admissions of chest pain    POTS (postural orthostatic tachycardia syndrome)- (present on admission)  Assessment & Plan  - continue Corlanor and beta-blocker therapy     Moderate persistent asthma without complication- (present on admission)  Assessment & Plan  - Albuterol PRN    Inappropriate sinus tachycardia (HCC)- (present on admission)  Assessment & Plan  - Continue metoprolol    TONYA (obstructive sleep apnea)- (present on admission)  Assessment & Plan  Reports being too claustrophobic to use CPAP. Discussed consequences of untreated apnea  - Supplemental oxygen as needed keep saturation greater than 90%    GERD (gastroesophageal reflux disease)- (present on admission)  Assessment & Plan  - Continue BID omeprazole         VTE prophylaxis:    heparin ppx      I have performed a physical exam and reviewed and updated ROS and Plan today (2/16/2025). In review of yesterday's note (2/15/2025), there are no changes except  as documented above.      Monica RODRIGUEZ DNP performed a substantiated portion of the service face-to-face with same patient on the same date of service INDEPENDENTLY FROM THE MD ON ASSESSMENT, EXAMINATION, DISCUSSION PLAN OF CARE FOR 25 MINUTES.  I was personally involved in reviewing and conducting the medical decision making, including the information as described above.

## 2025-02-16 NOTE — ASSESSMENT & PLAN NOTE
Reports being too claustrophobic to use CPAP. Discussed consequences of untreated apnea  - Supplemental oxygen as needed keep saturation greater than 90%

## 2025-02-16 NOTE — ASSESSMENT & PLAN NOTE
History of recent abnormal stress test. EKG without acute ST T changes. Troponin normal  ERP discussed case with cardiology  - telemonitor  - trend troponin  - echocardiogram  - sublingual Nitro for chest pain  - continue Ranexa  - cardiology -Dr. Jeff consulted due to; noted positive stress test on 2/6/2025, cardiology team feels that they should pursue a cardiac angiogram during this admission.;  Patient will be prepped for [facility] on 2/17.

## 2025-02-16 NOTE — H&P
"Hospital Medicine History & Physical Note    Date of Service  2/15/2025    Primary Care Physician  Fly Goodson M.D.    Consultants  cardiology    Specialist Names: Dr Jeff    Code Status  Full Code    Chief Complaint  Chief Complaint   Patient presents with    Chest Pain     Reports 8/10 \"pressure\" left sided chest pain that radiates into pt's left jaw since 2/14.    Dizziness     Since 2/14      N/V     Since today       History of Presenting Illness  Kristin Balderrama is a 34 y.o. femalewith a pmh of POTS, Annetta-Danlos, migraines, thyroid disorder, inappropriate sinus tachycardia, PCOS with hirsutism and recurrent noncardiac chest pain who presented 2/15/2025 with complaints of worsening chest pain. Patient reports left-sided chest pressure that radiated to her jaw since yesterday. She also reports some nausea, as well as some mild lower extremity edema. She denies dizziness, lightheadedness, shortness of breath or palpitations. The patient says she experiences more chest pain when she is under stress. She says she is had a significant amount of stress at work lately.   Patient has recurrent chest pain and has been seen multiple occasions, says she was following with cardiologist Dr Leon, but he retired and she has yet to establish care with another cardiologist. She recently had an abnormal stress test on 2/7/25. Cardiac catheterization was apparently considered, but patient did not want to proceed with this at that time due to history of Annetta-Danlos syndrome and concern of vascular injury due to the procedure itself, as she apparently had when she underwent an ablation in the past. Patient says she is uncomfortable being discharged from the ER due to her ongoing chest pain. She reports taking Ranexa as prescribed, as well as the remainder of her medications.  She denies recent cold flulike symptoms, cough, congestion, shortness of breath or pleuritic pain. She denies G.I. or  complaints.  ERP " discussed case with cardiologist. No formal consultation necessary at this time. However, cardiology is available if patient condition changes or if any abnormal test results are found.    EKG is normal sinus rhythm, 78 BPM, no acute ST-T changes.  Chest x-ray negative for acute findings. Troponin and BNP normal. CBC and CMP are unremarkable.     I discussed the plan of care with patient and ERP .    Review of Systems  Review of Systems   Constitutional:  Negative for chills, diaphoresis, fever and malaise/fatigue.   HENT:  Negative for congestion, sinus pain, sore throat and tinnitus.    Respiratory:  Negative for cough, shortness of breath and wheezing.    Cardiovascular:  Positive for chest pain. Negative for palpitations, orthopnea, claudication, leg swelling and PND.   Gastrointestinal:  Positive for nausea. Negative for abdominal pain, constipation, diarrhea, heartburn and vomiting.   Genitourinary:  Negative for dysuria, flank pain, frequency and urgency.   Musculoskeletal:  Negative for joint pain and myalgias.        Mild lower extremity edema   Skin:  Negative for itching and rash.   Neurological:  Negative for dizziness and headaches.   Psychiatric/Behavioral:  Negative for depression. The patient is not nervous/anxious.        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    Surgical History   has a past surgical history that includes neuro dest facet l/s w/ig sngl (04/21/2015); recovery (01/27/2016); del[place] by laparoscopy (08/29/2010); lumbar fusion anterior (08/21/2012); other cardiac surgery (01/2016); tonsillectomy; bowel stimulator insertion (07/13/2016); gastroscopy with balloon dilatation (N/A, 01/04/2017); muscle biopsy (Right, 01/26/2017); other abdominal surgery; laminotomy; bowel stimulator insertion (03/10/2021); pr lap,diagnostic abdomen (02/14/2022); ovarian cystectomy (Right, 02/14/2022); pr percut fix prox/neck femur fx (Left, 01/28/2023); inguinal hernia laparoscopic (Right, 07/21/2023); hernia repair (Right, 07/21/2023); pr cystoscopy,insert ureteral stent (Right, 2/12/2024); pr cysto/uretero/pyeloscopy, dx (Right, 2/12/2024); and lasertripsy (Right, 2/12/2024).     Family History  family history includes Genitourinary () Problems in her sister; Heart Disease in her maternal grandmother and mother; Hypertension in her maternal grandmother, maternal uncle, and mother; No Known Problems in her sister; Other in her mother and sister; Sleep Apnea in her mother.   Family history reviewed with patient. There is family history that is pertinent to the chief complaint.     Social History   reports that she has never smoked. She has never used smokeless tobacco. She reports that she does not drink alcohol and does not use drugs.    Allergies  Allergies   Allergen Reactions    Cefdinir Shortness of Breath and Itching     Tolerated 1/18/17  Tolerates ceftriaxone  Tolerated augmentin 8/2019     Depakote [Divalproex Sodium] Unspecified     Muscle spasms/muscle pain and weakness      Doxycycline Anaphylaxis and Vomiting     Other  "reaction(s): pustules/blisters  Other reaction(s): stomach pain    Montelukast [Singulair] Unspecified     Cardiac effusion    Vancomycin Itching     Pt becomes flushed in face and chest.   RXN=7/10/16    Wound Dressing Adhesive Rash     By pt report-\"removes skin totally off\"    Amitriptyline Unspecified     Headaches      Amoxicillin Rash      Tolerates augmentin    Aripiprazole [Abilify] Unspecified     Headaches/muscle twitching      Clindamycin Nausea         Other reaction(s): nausea stomach pain    Erythromycin Rash     .  Other reaction(s): nausea stomach pain    Flomax [Tamsulosin Hydrochloride] Swelling    Hydromorphone      Other reaction(s): vomiting    Levaquin Unspecified     Severe muscle cramps in legs  RXN=unknown  Other reaction(s): leg muscle cramps    Metformin Unspecified     Increased lactic acid     Other reaction(s): itching and rash/nausea vomiting    Sulfa Drugs Hives, Itching, Myalgia and Unspecified     Muscle pain and weakness    Other reaction(s): unknown    Tamsulosin Swelling     Swelling of legs    Tape Rash     Tears skin off  coban with Tegaderm tape ok intermittently  RXN=ongoing    Sulfamethoxazole W-Trimethoprim Rash    Ciprofloxacin Rash          Keflex Rash     Pt states she gets a rash with this medication  Tolerates ceftriaxone  Can take with Benadryl    Levofloxacin Unspecified     Leg muscle cramps    Metronidazole Rash     \"Vision problems\"  Other reaction(s): vision problems       Medications  Prior to Admission Medications   Prescriptions Last Dose Informant Patient Reported? Taking?   Galcanezumab-gnlm (EMGALITY) 120 MG/ML Solution Auto-injector  Patient No No   Sig: Inject 1 mL subcutaneously every month.   Melatonin 10 MG Tab  Patient Yes No   Sig: Take 10 mg by mouth at bedtime.   Rimegepant Sulfate (NURTEC) 75 MG TABLET DISPERSIBLE  Patient No No   Sig: Take 1 tablet by mouth at onset of migraine or aura okay to repeat in 24 hours if needed.   albuterol (PROVENTIL) " 2.5mg/3ml Nebu Soln solution for nebulization   No No   Sig: Take 3 mL by nebulization every four hours as needed for Shortness of Breath.   amLODIPine (NORVASC) 5 MG Tab  Patient No No   Sig: Take 1 tablet by mouth every day.   diphenhydrAMINE (BENADRYL) 25 MG Tab  Patient Yes No   Sig: Take 25-50 mg by mouth 2 times a day. Scheduled    25 mg = AM  50 MG = PM   gabapentin (NEURONTIN) 400 MG Cap   No No   Sig: TAKE 3 CAPSULES BY MOUTH THREE TIMES DAILY.   ipratropium-albuterol (COMBIVENT RESPIMAT)  MCG/ACT Aero Soln   No No   Sig: Inhale 2 Puffs 4 times a day as needed (shortness of breath, wheezing).   ivabradine (CORLANOR) 7.5 MG Tab tablet  Patient No No   Sig: Take 1 Tablet by mouth 2 times a day with meals.   lamoTRIgine (LAMICTAL) 25 MG Tab  Patient No No   Sig: Take 2 Tablets by mouth 2 times a day.   metoprolol SR (TOPROL XL) 25 MG TABLET SR 24 HR   No No   Sig: Take 1 tablet by mouth 2 times a day.   naproxen (NAPROSYN) 500 MG Tab   No No   Sig: Take 1 tablet by mouth 2 times a day   nitroglycerin (NITROSTAT) 0.4 MG SL Tab   No No   Sig: Place 1 Tablet under the tongue as needed for Chest Pain (may repeat for chest pain every 5 mins not to exceed 3 doses).   omeprazole (PRILOSEC) 20 MG delayed-release capsule   No No   Sig: Take 2 Capsules by mouth 2 times a day.   ondansetron (ZOFRAN ODT) 4 MG TABLET DISPERSIBLE   Yes No   Sig: DISSOLVE 1 TABLET ON THE TONGUE EVERY 8 HOURS FOR 5 DAYS AS NEEDED FOR NAUSEA/VOMITING   prochlorperazine (COMPAZINE) 10 MG Tab   No No   Sig: Take 1 Tablet by mouth every 6 hours as needed for Nausea/Vomiting.   ranolazine (RANEXA) 500 MG TABLET SR 12 HR   No No   Sig: Take 1 Tablet by mouth 2 times a day.   scopolamine (TRANSDERM SCOP, 1.5 MG,) 1 mg/72hr PATCH 72 HR   No No   Sig: Place 1 Patch on the skin every 72 hours.   spironolactone (ALDACTONE) 50 MG Tab   No No   Sig: Take 1 Tablet by mouth every day.   sumatriptan (IMITREX) 20 MG/ACT nasal spray  Patient No No   Sig:  Give 1 dose at onset headache okay to repeat in 2 hours if needed for max of 2 doses in a 24 hour timeframe.   tizanidine (ZANAFLEX) 4 MG capsule   No No   Sig: take 1 Tablet Three Times A Day as needed Qty 90  Dx.M54.16   topiramate (TOPAMAX) 50 MG tablet   No No   Sig: Take 1 Tablet by mouth 2 times a day.   ziprasidone (GEODON) 40 MG Cap  Patient No No   Sig: Take 1 capsule by mouth at bedtime.      Facility-Administered Medications: None       Physical Exam  Temp:  [36.5 °C (97.7 °F)] 36.5 °C (97.7 °F)  Pulse:  [68-81] 68  Resp:  [12-18] 18  BP: (112-167)/() 112/59  SpO2:  [94 %-97 %] 97 %  Blood Pressure: 112/59   Temperature: 36.5 °C (97.7 °F)   Pulse: 68   Respiration: 18   Pulse Oximetry: 97 %       Physical Exam  Vitals and nursing note reviewed.   Constitutional:       General: She is not in acute distress.     Appearance: She is obese. She is not ill-appearing or diaphoretic.   HENT:      Head: Normocephalic and atraumatic.      Nose: Nose normal. No congestion or rhinorrhea.      Mouth/Throat:      Mouth: Mucous membranes are dry.      Pharynx: Oropharynx is clear. No oropharyngeal exudate or posterior oropharyngeal erythema.   Eyes:      Extraocular Movements: Extraocular movements intact.      Pupils: Pupils are equal, round, and reactive to light.   Cardiovascular:      Rate and Rhythm: Normal rate and regular rhythm.      Pulses: Normal pulses.      Heart sounds: Normal heart sounds. No murmur heard.  Pulmonary:      Effort: Pulmonary effort is normal. No respiratory distress.      Breath sounds: Normal breath sounds. No wheezing.   Abdominal:      General: Bowel sounds are normal. There is no distension.      Palpations: Abdomen is soft.      Tenderness: There is no abdominal tenderness.   Musculoskeletal:         General: Normal range of motion.      Cervical back: Normal range of motion and neck supple.      Right lower leg: No edema.      Left lower leg: No edema.   Skin:     General: Skin  is warm and dry.      Capillary Refill: Capillary refill takes less than 2 seconds.      Findings: No lesion or rash.   Neurological:      General: No focal deficit present.      Mental Status: She is alert and oriented to person, place, and time.   Psychiatric:         Mood and Affect: Mood normal.         Behavior: Behavior normal.         Laboratory:  Recent Labs     02/15/25  1244   WBC 7.0   RBC 4.83   HEMOGLOBIN 15.1   HEMATOCRIT 44.2   MCV 91.5   MCH 31.3   MCHC 34.2   RDW 40.0   PLATELETCT 234   MPV 11.3     Recent Labs     02/15/25  1244   SODIUM 139   POTASSIUM 3.9   CHLORIDE 109   CO2 16*   GLUCOSE 134*   BUN 13   CREATININE 0.84   CALCIUM 9.8     Recent Labs     02/15/25  1244   ALTSGPT 41   ASTSGOT 26   ALKPHOSPHAT 76   TBILIRUBIN 0.5   GLUCOSE 134*         Recent Labs     02/15/25  1244   NTPROBNP 82         Recent Labs     02/15/25  1244   TROPONINT <6       Imaging:  DX-CHEST-PORTABLE (1 VIEW)   Final Result      No evidence of acute cardiopulmonary process.          EKG 24/15/25  Measurements   Intervals                               Axis   Rate:        78                           P:          48   AL:         146                          QRS:     17   QRSD:      99                           T:            9   QT:          438   QTc:         499   Interpretive Statements   Rate 78, sinus rhythm, no ST elevation or depression, no T wave inversions,slight T wave deflections present in V1 and V2, not significantly changed from prior EKG.         ASSESSMENT/PLANN:  * Acute chest pain- (present on admission)  Assessment & Plan  History of recent abnormal stress test. EKG without acute ST T changes. Troponin normal  ERP discussed case with cardiology  - telemonitor  - trend troponin  - echocardiogram  - sublingual Nitro for chest pain  - continue Ranexa  - cardiology will be consulted if there is any change in condition or abnormal findings.    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 Discussed consequences of untreated apnea  - Supplemental oxygen as needed keep saturation greater than 90%    GERD (gastroesophageal reflux disease)- (present on admission)  Assessment & Plan  - Continue BID omeprazole      Justification for Admission Status  I anticipate this patient is appropriate for observation status at this time because patient requires overnight monitoring with telemetry and serial troponin's  Patient medical medical floor due to need for overnight observation with telemetry and serial troponin's    VTE prophylaxis: SCDs/TEDs and heparin ppx

## 2025-02-16 NOTE — HOSPITAL COURSE
Kristin Balderrama is a 34-year-old female with PMHx POTS, Annetta-Danlos, migraines, PCOS, inappropriate sinus tachycardia.  Admitted 2/15 for chest pain.    Per history-patient reports left-sided chest pressure radiating to her jaw since 2/14.  Associated with nausea and lower extremity edema.  Patient is followed by cardiology.  In the ED EKG without ST elevation or depression concerning for ischemia.  CXR within normal limits.  Troponin and BNP normal.  Cardiology was consulted.  Patient previously scheduled for LHC but elected to hold off at that time.  Now the patient is again presenting with chest pain and has a history of abnormal stress test-cardiology recommending left heart cath.    Left heart cath without interventions required to performed.  Patient is likely having intermittent chest pain secondary to coronary vasospasm.  She will continue to take Ranexa for spasm pain.  She will follow-up with cardiology in the next 1 to 2 weeks.  She has been cleared to discharge.

## 2025-02-16 NOTE — PROGRESS NOTES
"Patient transferred to Tele 7 room 705 via bed on monitor by keith RN and Mich HASSAN. Patient A+Ox4, satting 93% on room air, C/O 6/10 \"stomping\" chest pain from left chest wall radiating to left arm and left jaw. Unchanged from yesterday. NPO for cath lab. Oriented to unit, orders reviewed, patient educated to call for assist when getting OOB   "

## 2025-02-17 ENCOUNTER — APPOINTMENT (OUTPATIENT)
Dept: CARDIOLOGY | Facility: MEDICAL CENTER | Age: 35
DRG: 287 | End: 2025-02-17
Attending: INTERNAL MEDICINE
Payer: MEDICARE

## 2025-02-17 VITALS
SYSTOLIC BLOOD PRESSURE: 124 MMHG | HEART RATE: 73 BPM | HEIGHT: 64 IN | RESPIRATION RATE: 18 BRPM | OXYGEN SATURATION: 94 % | WEIGHT: 262.13 LBS | DIASTOLIC BLOOD PRESSURE: 78 MMHG | TEMPERATURE: 97 F | BODY MASS INDEX: 44.75 KG/M2

## 2025-02-17 LAB
ANION GAP SERPL CALC-SCNC: 12 MMOL/L (ref 7–16)
BASE EXCESS BLDA CALC-SCNC: -8 MMOL/L (ref -4–3)
BODY TEMPERATURE: ABNORMAL DEGREES
BUN SERPL-MCNC: 11 MG/DL (ref 8–22)
CALCIUM SERPL-MCNC: 9.3 MG/DL (ref 8.5–10.5)
CHLORIDE SERPL-SCNC: 109 MMOL/L (ref 96–112)
CO2 BLDA-SCNC: 18 MMOL/L (ref 32–48)
CO2 SERPL-SCNC: 18 MMOL/L (ref 20–33)
CREAT SERPL-MCNC: 0.93 MG/DL (ref 0.5–1.4)
ERYTHROCYTE [DISTWIDTH] IN BLOOD BY AUTOMATED COUNT: 42.4 FL (ref 35.9–50)
GFR SERPLBLD CREATININE-BSD FMLA CKD-EPI: 82 ML/MIN/1.73 M 2
GLUCOSE SERPL-MCNC: 129 MG/DL (ref 65–99)
HCO3 BLDA-SCNC: 17.1 MMOL/L (ref 21–28)
HCT VFR BLD AUTO: 43.3 % (ref 37–47)
HGB BLD-MCNC: 14.5 G/DL (ref 12–16)
LACTATE BLD-SCNC: 0.7 MMOL/L (ref 0.5–2)
MCH RBC QN AUTO: 31.7 PG (ref 27–33)
MCHC RBC AUTO-ENTMCNC: 33.5 G/DL (ref 32.2–35.5)
MCV RBC AUTO: 94.7 FL (ref 81.4–97.8)
PCO2 BLDA: 31.9 MMHG (ref 32–48)
PH BLDA: 7.34 [PH] (ref 7.35–7.45)
PLATELET # BLD AUTO: 196 K/UL (ref 164–446)
PMV BLD AUTO: 11.4 FL (ref 9–12.9)
PO2 BLDA: 81 MMHG (ref 83–108)
POTASSIUM SERPL-SCNC: 3.8 MMOL/L (ref 3.6–5.5)
RBC # BLD AUTO: 4.57 M/UL (ref 4.2–5.4)
SAO2 % BLDA: 95 % (ref 93–99)
SODIUM SERPL-SCNC: 139 MMOL/L (ref 135–145)
SPECIMEN DRAWN FROM PATIENT: ABNORMAL
WBC # BLD AUTO: 5.5 K/UL (ref 4.8–10.8)

## 2025-02-17 PROCEDURE — B2111ZZ FLUOROSCOPY OF MULTIPLE CORONARY ARTERIES USING LOW OSMOLAR CONTRAST: ICD-10-PCS | Performed by: INTERNAL MEDICINE

## 2025-02-17 PROCEDURE — 99152 MOD SED SAME PHYS/QHP 5/>YRS: CPT | Performed by: INTERNAL MEDICINE

## 2025-02-17 PROCEDURE — 80048 BASIC METABOLIC PNL TOTAL CA: CPT

## 2025-02-17 PROCEDURE — 700102 HCHG RX REV CODE 250 W/ 637 OVERRIDE(OP): Performed by: PHYSICIAN ASSISTANT

## 2025-02-17 PROCEDURE — 700101 HCHG RX REV CODE 250

## 2025-02-17 PROCEDURE — 83605 ASSAY OF LACTIC ACID: CPT

## 2025-02-17 PROCEDURE — 99232 SBSQ HOSP IP/OBS MODERATE 35: CPT | Mod: FS | Performed by: INTERNAL MEDICINE

## 2025-02-17 PROCEDURE — 99153 MOD SED SAME PHYS/QHP EA: CPT

## 2025-02-17 PROCEDURE — 700102 HCHG RX REV CODE 250 W/ 637 OVERRIDE(OP)

## 2025-02-17 PROCEDURE — 4A023N7 MEASUREMENT OF CARDIAC SAMPLING AND PRESSURE, LEFT HEART, PERCUTANEOUS APPROACH: ICD-10-PCS | Performed by: INTERNAL MEDICINE

## 2025-02-17 PROCEDURE — 700117 HCHG RX CONTRAST REV CODE 255: Performed by: INTERNAL MEDICINE

## 2025-02-17 PROCEDURE — 93458 L HRT ARTERY/VENTRICLE ANGIO: CPT | Mod: 26 | Performed by: INTERNAL MEDICINE

## 2025-02-17 PROCEDURE — 99239 HOSP IP/OBS DSCHRG MGMT >30: CPT | Performed by: STUDENT IN AN ORGANIZED HEALTH CARE EDUCATION/TRAINING PROGRAM

## 2025-02-17 PROCEDURE — A9270 NON-COVERED ITEM OR SERVICE: HCPCS | Performed by: NURSE PRACTITIONER

## 2025-02-17 PROCEDURE — A9270 NON-COVERED ITEM OR SERVICE: HCPCS | Performed by: PHYSICIAN ASSISTANT

## 2025-02-17 PROCEDURE — 700111 HCHG RX REV CODE 636 W/ 250 OVERRIDE (IP): Performed by: NURSE PRACTITIONER

## 2025-02-17 PROCEDURE — 85027 COMPLETE CBC AUTOMATED: CPT

## 2025-02-17 PROCEDURE — 700111 HCHG RX REV CODE 636 W/ 250 OVERRIDE (IP): Mod: JZ

## 2025-02-17 PROCEDURE — 700102 HCHG RX REV CODE 250 W/ 637 OVERRIDE(OP): Performed by: NURSE PRACTITIONER

## 2025-02-17 PROCEDURE — 82803 BLOOD GASES ANY COMBINATION: CPT

## 2025-02-17 PROCEDURE — A9270 NON-COVERED ITEM OR SERVICE: HCPCS

## 2025-02-17 RX ORDER — VERAPAMIL HYDROCHLORIDE 2.5 MG/ML
INJECTION, SOLUTION INTRAVENOUS
Status: COMPLETED
Start: 2025-02-17 | End: 2025-02-17

## 2025-02-17 RX ORDER — MIDAZOLAM HYDROCHLORIDE 1 MG/ML
INJECTION INTRAMUSCULAR; INTRAVENOUS
Status: COMPLETED
Start: 2025-02-17 | End: 2025-02-17

## 2025-02-17 RX ORDER — ASPIRIN 81 MG/1
81 TABLET ORAL DAILY
Qty: 100 TABLET | Refills: 3 | Status: SHIPPED | OUTPATIENT
Start: 2025-02-18

## 2025-02-17 RX ORDER — ALBUTEROL SULFATE 0.83 MG/ML
2.5 SOLUTION RESPIRATORY (INHALATION) EVERY 4 HOURS PRN
Qty: 75 ML | Refills: 0 | Status: SHIPPED | OUTPATIENT
Start: 2025-02-17 | End: 2025-03-23

## 2025-02-17 RX ORDER — HEPARIN SODIUM 1000 [USP'U]/ML
INJECTION, SOLUTION INTRAVENOUS; SUBCUTANEOUS
Status: COMPLETED
Start: 2025-02-17 | End: 2025-02-17

## 2025-02-17 RX ORDER — LIDOCAINE HYDROCHLORIDE 20 MG/ML
INJECTION, SOLUTION INFILTRATION; PERINEURAL
Status: COMPLETED
Start: 2025-02-17 | End: 2025-02-17

## 2025-02-17 RX ORDER — ASPIRIN 81 MG/1
TABLET, CHEWABLE ORAL
Status: COMPLETED
Start: 2025-02-17 | End: 2025-02-17

## 2025-02-17 RX ORDER — HEPARIN SODIUM 200 [USP'U]/100ML
INJECTION, SOLUTION INTRAVENOUS
Status: COMPLETED
Start: 2025-02-17 | End: 2025-02-17

## 2025-02-17 RX ADMIN — SPIRONOLACTONE 50 MG: 50 TABLET ORAL at 05:34

## 2025-02-17 RX ADMIN — MIDAZOLAM HYDROCHLORIDE 2 MG: 1 INJECTION, SOLUTION INTRAMUSCULAR; INTRAVENOUS at 09:10

## 2025-02-17 RX ADMIN — HEPARIN SODIUM: 1000 INJECTION, SOLUTION INTRAVENOUS; SUBCUTANEOUS at 08:18

## 2025-02-17 RX ADMIN — ASPIRIN 81 MG: 81 TABLET, COATED ORAL at 05:33

## 2025-02-17 RX ADMIN — NITROGLYCERIN 10 ML: 20 INJECTION INTRAVENOUS at 08:18

## 2025-02-17 RX ADMIN — FENTANYL CITRATE 100 MCG: 50 INJECTION, SOLUTION INTRAMUSCULAR; INTRAVENOUS at 09:10

## 2025-02-17 RX ADMIN — LAMOTRIGINE 50 MG: 25 TABLET ORAL at 05:34

## 2025-02-17 RX ADMIN — OMEPRAZOLE 40 MG: 20 CAPSULE, DELAYED RELEASE ORAL at 07:32

## 2025-02-17 RX ADMIN — MORPHINE SULFATE 2 MG: 4 INJECTION, SOLUTION INTRAMUSCULAR; INTRAVENOUS at 05:35

## 2025-02-17 RX ADMIN — ASPIRIN 162 MG: 81 TABLET, CHEWABLE ORAL at 07:45

## 2025-02-17 RX ADMIN — TOPIRAMATE 50 MG: 25 TABLET, FILM COATED ORAL at 05:34

## 2025-02-17 RX ADMIN — HEPARIN SODIUM 5000 UNITS: 5000 INJECTION, SOLUTION INTRAVENOUS; SUBCUTANEOUS at 05:33

## 2025-02-17 RX ADMIN — METOPROLOL SUCCINATE 50 MG: 50 TABLET, EXTENDED RELEASE ORAL at 05:34

## 2025-02-17 RX ADMIN — AMLODIPINE BESYLATE 5 MG: 5 TABLET ORAL at 05:33

## 2025-02-17 RX ADMIN — MORPHINE SULFATE 2 MG: 4 INJECTION, SOLUTION INTRAMUSCULAR; INTRAVENOUS at 01:02

## 2025-02-17 RX ADMIN — VERAPAMIL HYDROCHLORIDE 5 MG: 2.5 INJECTION, SOLUTION INTRAVENOUS at 08:18

## 2025-02-17 RX ADMIN — IOHEXOL 25 ML: 350 INJECTION, SOLUTION INTRAVENOUS at 09:13

## 2025-02-17 RX ADMIN — HEPARIN SODIUM 2000 UNITS: 200 INJECTION, SOLUTION INTRAVENOUS at 08:18

## 2025-02-17 RX ADMIN — RANOLAZINE 500 MG: 500 TABLET, FILM COATED, EXTENDED RELEASE ORAL at 05:34

## 2025-02-17 RX ADMIN — LIDOCAINE HYDROCHLORIDE: 20 INJECTION, SOLUTION INFILTRATION; PERINEURAL at 08:18

## 2025-02-17 RX ADMIN — MIDAZOLAM HYDROCHLORIDE 0.5 MG: 1 INJECTION, SOLUTION INTRAMUSCULAR; INTRAVENOUS at 09:17

## 2025-02-17 RX ADMIN — GABAPENTIN 1200 MG: 400 CAPSULE ORAL at 05:33

## 2025-02-17 ASSESSMENT — ENCOUNTER SYMPTOMS
SHORTNESS OF BREATH: 0
NUMBNESS: 0
DIAPHORESIS: 0
CHILLS: 0
FEVER: 0
TROUBLE SWALLOWING: 0
ABDOMINAL PAIN: 0
DIZZINESS: 0
FATIGUE: 1
CHEST TIGHTNESS: 0
COLOR CHANGE: 0
NERVOUS/ANXIOUS: 0
BLOOD IN STOOL: 0
COUGH: 0
CONFUSION: 0
PALPITATIONS: 0
AGITATION: 0
ABDOMINAL DISTENTION: 0

## 2025-02-17 ASSESSMENT — PAIN DESCRIPTION - PAIN TYPE
TYPE: ACUTE PAIN

## 2025-02-17 ASSESSMENT — FIBROSIS 4 INDEX
FIB4 SCORE: 0.73
FIB4 SCORE: 0.73

## 2025-02-17 NOTE — PROCEDURES
Cardiac Catheterization Laboratory Procedure Note    DATE: 2/17/2025    : Norberto Bryant MD    PROCEDURES PERFORMED:  Left heart catheterization  Coronary angiography  Moderate conscious sedation    INDICATIONS:  The patient is a 34-year-old woman referred for cardiac catheterization to evaluate chronic chest pain symptoms with an abnormal cardiac stress test.    CONSENT:  The complete alternatives, risks, and benefits of the procedure were explained to the patient.  Signed informed consent was obtained and placed in the chart prior to the procedure.  A timeout was performed prior to beginning the procedure.    MEDICATIONS:  Lidocaine  Fentanyl  Midazolam  Nitroglycerin  Verapamil  Heparin    MODERATE CONSCIOUS SEDATION:  I personally supervised the administration of moderate conscious sedation by the nursing staff for 59 minutes.  Sedation start time: 8:14 AM  Sedation end time: 9:13 AM    CONTRAST: Omnipaque 25 cc    ACCESS: 6-[place] Glidesheath in the right radial artery.    ESTIMATED BLOOD LOSS: 10 cc    COMPLICATIONS: None    DESCRIPTION OF PROCEDURE:  The patient was brought to the cardiac catheterization laboratory in the fasting state without a functioning peripheral IV.  The Cath Lab nursing staff was unable to obtain peripheral access.  The skin over the right wrist and right groin had been prepped and draped in the usual sterile fashion.  Lidocaine infiltration was used to anesthetize the tissue over the right groin.  However, despite use of ultrasound guidance, due to the patient's morbid obesity the right femoral vein was poorly visualized and easily collapsible.  Multiple attempts were made to puncture the right femoral vein with a micro puncture needle unsuccessfully.  A microneedle entered the right femoral artery multiple times.  Given that the vein could not be well-visualized and appeared to be easily collapsible, there was concern that further attempts could cause unnecessary  complications.  Ultimately, the ED trauma nurse was able to obtain a peripheral IV in the left arm and we proceeded with the procedure.     Lidocaine infiltration was used to anesthetize the tissue over the right wrist.  Using the micropuncture technique, a 6-[place]ese Glidesheath was inserted in the right radial artery.  A 5-[place]ese Shar catheter was then advanced over a standard J-wire into the left ventricular cavity where it was gently aspirated, flushed, and then withdrawn across the aortic valve with sequential pressures measured.  This catheter was then used to engage the ostium of the right coronary artery and cineangiograms were obtained in multiple projections for complete evaluation of the right coronary system.  This catheter was then exchanged for a 6-[place]ese JL-3.5 diagnostic catheter, which was used to engage the ostium of the left main coronary artery and cineangiograms were obtained in multiple projections for complete evaluation of the left coronary system.  At the completion of the case all wires, catheters, and sheaths were removed.  A TR band was placed using the patent hemostasis technique.    HEMODYNAMICS:   Aortic pressure: 112/79 mmHg  Pre-A wave pressure: 9 mmHg  No significant aortic gradient on pullback    CORONARY ANGIOGRAPHY:  The left main coronary artery is patent and bifurcates into the left anterior descending and left circumflex coronaries.  The left anterior descending coronary artery is a large, transapical vessel that supplies a moderate first diagonal branch and a small second diagonal branch and has no angiographically significant disease.  The left circumflex coronary artery is a large, nondominant vessel that supplies a small first obtuse marginal branch, a large and bifurcating second obtuse marginal branch, and a small third obtuse marginal branch and has no angiographically significant disease.  The right coronary artery is a large, dominant vessel that supplies a large  posterior descending coronary and a large posterolateral branch and has no angiographically significant disease.    IMPRESSION:  Angiographically normal coronary arteries.  Normal left heart filling pressures.    RECOMMENDATIONS:  Return to floor with continued care per primary service.  TR band release per protocol.  Continue evaluation for chronic chest pain symptoms not due to obstructive epicardial coronary artery disease.

## 2025-02-17 NOTE — DISCHARGE SUMMARY
"Discharge Summary    CHIEF COMPLAINT ON ADMISSION  Chief Complaint   Patient presents with    Chest Pain     Reports 8/10 \"pressure\" left sided chest pain that radiates into pt's left jaw since 2/14.    Dizziness     Since 2/14      N/V     Since today       Reason for Admission  chest pain     Admission Date  2/15/2025    CODE STATUS  Full Code    HPI & HOSPITAL COURSE    Kristin Balderrama is a 34-year-old female with PMHx POTS, Annetta-Danlos, migraines, PCOS, inappropriate sinus tachycardia.  Admitted 2/15 for chest pain.    Per history-patient reports left-sided chest pressure radiating to her jaw since 2/14.  Associated with nausea and lower extremity edema.  Patient is followed by cardiology.  In the ED EKG without ST elevation or depression concerning for ischemia.  CXR within normal limits.  Troponin and BNP normal.  Cardiology was consulted.  Patient previously scheduled for LHC but elected to hold off at that time.  Now the patient is again presenting with chest pain and has a history of abnormal stress test-cardiology recommending left heart cath.    Left heart cath without interventions required to performed.  Patient is likely having intermittent chest pain secondary to coronary vasospasm.  She will continue to take Ranexa for spasm pain.  She will follow-up with cardiology in the next 1 to 2 weeks.  She has been cleared to discharge.      Therefore, she is discharged in good and stable condition to home with close outpatient follow-up.    The patient met 2-midnight criteria for an inpatient stay at the time of discharge.    Discharge Date  2/17/2025    FOLLOW UP ITEMS POST DISCHARGE  Follow-up with cardiology 1 week    DISCHARGE DIAGNOSES  Principal Problem:    Acute chest pain (POA: Yes)  Active Problems:    GERD (gastroesophageal reflux disease) (Chronic) (POA: Yes)      Overview: Due to EDS/ gastroparesis      On omeprazole 20mg bid     TONYA (obstructive sleep apnea) (POA: Yes)    Inappropriate sinus " tachycardia (HCC) (POA: Yes)    Moderate persistent asthma without complication (POA: Yes)    POTS (postural orthostatic tachycardia syndrome) (POA: Yes)    Annetta-Danlos syndrome (Chronic) (POA: Yes)    PCOS (polycystic ovarian syndrome) (POA: Yes)    Chronic obstructive pulmonary disease (HCC) (POA: Yes)    Chest pain (POA: Yes)  Resolved Problems:    * No resolved hospital problems. *      FOLLOW UP  Future Appointments   Date Time Provider Department Center   2/25/2025  4:30 PM [facility]   3/4/2025  4:00 PM Sidney Fowler D.O. [facility] None   3/6/2025  8:45 AM RHIANNON Beard M.D. [facility] None   3/20/2025 11:40 AM Fly Goodson M.D. [facility] M.D.  [address]  [address]  [facility] [address]  [phone]          [facility], Emergency Dept  [address] [facility] [address]  [phone]  Go to   If symptoms worsen      MEDICATIONS ON DISCHARGE     Medication List        START taking these medications        Instructions   aspirin 81 MG EC tablet  Start taking on: February 18, 2025   Take 1 Tablet by mouth every day.  Dose: 81 mg            CONTINUE taking these medications        Instructions   albuterol 2.5mg/3ml Nebu solution for nebulization  Commonly known as: Proventil   Take 3 mL by nebulization every four hours as needed for Shortness of Breath.  Dose: 2.5 mg     amLODIPine 5 MG Tabs  Commonly known as: Norvasc   Take 1 tablet by mouth every day.  Dose: 5 mg     Corlanor 7.5 MG Tabs tablet  Generic drug: ivabradine   Doctor's comments: hold  Take 1 Tablet by mouth 2 times a day with meals.  Dose: 7.5 mg     diphenhydrAMINE 25 MG Tabs  Commonly known as: Benadryl   Take 50 mg by mouth at bedtime.  Dose: 50 mg     docusate sodium 250 MG capsule  Commonly known as: Colace   Take 250 mg by mouth 2 times a day.  Dose: 250 mg     Emgality 120 MG/ML Soaj  Generic drug: Galcanezumab-gnlm   Inject 1 mL subcutaneously every month.      gabapentin 400 MG Caps  Commonly known as: Neurontin   Doctor's comments:  FOR 30 DAY SUPPLY. DX: M79.7  TAKE 3 CAPSULES BY MOUTH THREE TIMES DAILY.     lamoTRIgine 25 MG Tabs  Commonly known as: LaMICtal   Take 2 Tablets by mouth 2 times a day.  Dose: 50 mg     Melatonin 10 MG Tabs   Take 10 mg by mouth at bedtime.  Dose: 10 mg     metoprolol SR 25 MG Tb24  Commonly known as: Toprol XL   Doctor's comments: This a new dose as of 9/4/2024.  Take 1 tablet by mouth 2 times a day.  Dose: 25 mg     nitroglycerin 0.4 MG Subl  Commonly known as: Nitrostat   Place 1 Tablet under the tongue as needed for Chest Pain (may repeat for chest pain every 5 mins not to exceed 3 doses).  Dose: 0.4 mg     Nurtec 75 MG Tbdp  Generic drug: Rimegepant Sulfate   Take 1 tablet by mouth at onset of migraine or aura okay to repeat in 24 hours if needed.     omeprazole 20 MG delayed-release capsule  Commonly known as: PriLOSEC   Take 2 Capsules by mouth 2 times a day.  Dose: 40 mg     ranolazine 500 MG Tb12  Commonly known as: Ranexa   Take 1 Tablet by mouth 2 times a day.  Dose: 500 mg     scopolamine 1 mg/72hr Pt72  Commonly known as: Transderm Scop (1.5 MG)   Place 1 Patch on the skin every 72 hours.  Dose: 1 Patch     spironolactone 50 MG Tabs  Commonly known as: Aldactone   Take 1 Tablet by mouth every day.  Dose: 50 mg     sumatriptan 20 MG/ACT nasal spray  Commonly known as: Imitrex   Give 1 dose at onset headache okay to repeat in 2 hours if needed for max of 2 doses in a 24 hour timeframe.     tizanidine 4 MG Tabs  Commonly known as: Zanaflex   Take 4 mg by mouth at bedtime.  Dose: 4 mg     topiramate 50 MG tablet  Commonly known as: Topamax   Doctor's comments: THIS IS A 90 DAY SUPPLY  Take 1 Tablet by mouth 2 times a day.  Dose: 50 mg     ziprasidone 40 MG Caps  Commonly known as: Geodon   Take 1 capsule by mouth at bedtime.  Dose: 40 mg              Allergies  Allergies   Allergen Reactions    Cefdinir Shortness of Breath  "and Itching     Tolerated 1/18/17  Tolerates ceftriaxone  Tolerated augmentin 8/2019     Depakote [Divalproex Sodium] Unspecified     Muscle spasms/muscle pain and weakness      Doxycycline Anaphylaxis and Vomiting     Other reaction(s): pustules/blisters  Other reaction(s): stomach pain    Montelukast [Singulair] Unspecified     Cardiac effusion    Vancomycin Itching     Pt becomes flushed in face and chest.   RXN=7/10/16    Wound Dressing Adhesive Rash     By pt report-\"removes skin totally off\"    Amitriptyline Unspecified     Headaches      Amoxicillin Rash      Tolerates augmentin    Aripiprazole [Abilify] Unspecified     Headaches/muscle twitching      Clindamycin Nausea         Other reaction(s): nausea stomach pain    Erythromycin Rash     .  Other reaction(s): nausea stomach pain    Flomax [Tamsulosin Hydrochloride] Swelling    Hydromorphone      Other reaction(s): vomiting    Levaquin Unspecified     Severe muscle cramps in legs  RXN=unknown  Other reaction(s): leg muscle cramps    Metformin Unspecified     Increased lactic acid     Other reaction(s): itching and rash/nausea vomiting    Sulfa Drugs Hives, Itching, Myalgia and Unspecified     Muscle pain and weakness    Other reaction(s): unknown    Tamsulosin Swelling     Swelling of legs    Tape Rash     Tears skin off  coban with Tegaderm tape ok intermittently  RXN=ongoing    Sulfamethoxazole W-Trimethoprim Rash    Ciprofloxacin Rash          Keflex Rash     Pt states she gets a rash with this medication  Tolerates ceftriaxone  Can take with Benadryl    Levofloxacin Unspecified     Leg muscle cramps    Metronidazole Rash     \"Vision problems\"  Other reaction(s): vision problems       DIET  Orders Placed This Encounter   Procedures    Diet Order Diet: Cardiac     Standing Status:   Standing     Number of Occurrences:   1     Diet::   Cardiac [6]       ACTIVITY  As tolerated.  Weight bearing as tolerated    CONSULTATIONS  Cardiology    PROCEDURES  Left " heart cath:  CORONARY ANGIOGRAPHY:  The left main coronary artery is patent and bifurcates into the left anterior descending and left circumflex coronaries.  The left anterior descending coronary artery is a large, transapical vessel that supplies a moderate first diagonal branch and a small second diagonal branch and has no angiographically significant disease.  The left circumflex coronary artery is a large, nondominant vessel that supplies a small first obtuse marginal branch, a large and bifurcating second obtuse marginal branch, and a small third obtuse marginal branch and has no angiographically significant disease.  The right coronary artery is a large, dominant vessel that supplies a large posterior descending coronary and a large posterolateral branch and has no angiographically significant disease.     IMPRESSION:  Angiographically normal coronary arteries.  Normal left heart filling pressures.    LABORATORY  Lab Results   Component Value Date    SODIUM 139 02/17/2025    POTASSIUM 3.8 02/17/2025    CHLORIDE 109 02/17/2025    CO2 18 (L) 02/17/2025    GLUCOSE 129 (H) 02/17/2025    BUN 11 02/17/2025    CREATININE 0.93 02/17/2025    CREATININE 0.75 (L) 07/20/2010    GLOMRATE >59 07/20/2010        Lab Results   Component Value Date    WBC 5.5 02/17/2025    WBC 6.1 07/20/2010    HEMOGLOBIN 14.5 02/17/2025    HEMATOCRIT 43.3 02/17/2025    PLATELETCT 196 02/17/2025      DX-CHEST-PORTABLE (1 VIEW)   Final Result      No evidence of acute cardiopulmonary process.      CL-LEFT HEART CATHETERIZATION WITH POSSIBLE INTERVENTION    (Results Pending)         Total time of the discharge process exceeds 49 minutes.

## 2025-02-17 NOTE — THERAPY
Physical Therapy Contact Note    Patient Name: Kristin Balderrama  Age:  34 y.o., Sex:  female  Medical Record #: [id number]  Today's Date: 2/17/2025 02/17/25 0832   Interdisciplinary Plan of Care Collaboration   Collaboration Comments Pt to have LHC today. Will assess at another time as appropriate.

## 2025-02-17 NOTE — PROGRESS NOTES
4 Eyes Skin Assessment Completed by SONYA Pugh and SONYA Epps.    Head WDL  Ears WDL  Nose WDL  Mouth WDL  Neck WDL  Breast/Chest WDL  Shoulder Blades WDL  Spine WDL  (R) Arm/Elbow/Hand WDL  (L) Arm/Elbow/Hand WDL  Abdomen WDL  Groin Redness  Scrotum/Coccyx/Buttocks Redness  (R) Leg Edema  (L) Leg Edema  (R) Heel/Foot/Toe Edema  (L) Heel/Foot/Toe Edema      Devices In Places Tele Box, Blood Pressure Cuff, and Pulse Ox      Interventions In Place Pillows    Possible Skin Injury No    Pictures Uploaded Into Epic N/A  Wound Consult Placed N/A  RN Wound Prevention Protocol Ordered No

## 2025-02-17 NOTE — PROGRESS NOTES
Cardiology Follow Up Progress Note    Date of Service  2/17/2025    Attending Physician  Ignacia Guzman M.D.    Chief Complaint   Chest pain with abnormal stress test     HPI  Kristin Balderrama is a 34 y.o. female admitted 2/15/2025 with medical history of POTS, inappropriate ST s/p ablation in 2016, Annetta-Danlos, PCOS with hirsutism, NPH normal pressure hydrocephalus, obesity and TONYA.    Presented 2/15/2025 with chest pain. Had abnormal stress test     Interim Events  No event overnight   Denies any chest pain, sob, dizziness or palpitations   NSR on the monitor   Right radial has hemoband in place s/p cath     Review of Systems  Review of Systems   Constitutional:  Positive for fatigue. Negative for chills, diaphoresis and fever.   HENT:  Negative for nosebleeds and trouble swallowing.    Respiratory:  Negative for cough, chest tightness and shortness of breath.    Cardiovascular:  Negative for chest pain, palpitations and leg swelling.   Gastrointestinal:  Negative for abdominal distention, abdominal pain and blood in stool.   Genitourinary:  Negative for hematuria.   Skin:  Negative for color change.   Neurological:  Negative for dizziness, syncope and numbness.   Psychiatric/Behavioral:  Negative for agitation and confusion. The patient is not nervous/anxious.        Vital signs in last 24 hours  Temp:  [36.1 °C (97 °F)-36.9 °C (98.4 °F)] 36.1 °C (97 °F)  Pulse:  [60-96] 81  Resp:  [14-18] 18  BP: (109-143)/(64-94) 135/68  SpO2:  [90 %-96 %] 96 %    Physical Exam  Physical Exam  Vitals and nursing note reviewed.   Constitutional:       Appearance: Normal appearance.   HENT:      Head: Normocephalic and atraumatic.   Eyes:      Pupils: Pupils are equal, round, and reactive to light.   Cardiovascular:      Rate and Rhythm: Normal rate and regular rhythm.      Heart sounds: Normal heart sounds. No murmur heard.  Pulmonary:      Effort: Pulmonary effort is normal.      Breath sounds: Normal breath sounds.    Abdominal:      General: Abdomen is flat.   Musculoskeletal:      Cervical back: Normal range of motion.   Skin:     General: Skin is warm and dry.   Neurological:      General: No focal deficit present.      Mental Status: She is alert and oriented to person, place, and time.   Psychiatric:         Mood and Affect: Mood normal.         Behavior: Behavior normal.         Thought Content: Thought content normal.         Judgment: Judgment normal.         Lab Review  Lab Results   Component Value Date/Time    WBC 5.5 02/17/2025 01:01 AM    WBC 6.1 07/20/2010 11:00 AM    RBC 4.57 02/17/2025 01:01 AM    RBC 4.38 07/20/2010 11:00 AM    HEMOGLOBIN 14.5 02/17/2025 01:01 AM    HEMATOCRIT 43.3 02/17/2025 01:01 AM    MCV 94.7 02/17/2025 01:01 AM    MCV 93 07/20/2010 11:00 AM    MCH 31.7 02/17/2025 01:01 AM    MCH 30.1 07/20/2010 11:00 AM    MCHC 33.5 02/17/2025 01:01 AM    MPV 11.4 02/17/2025 01:01 AM      Lab Results   Component Value Date/Time    SODIUM 139 02/17/2025 01:01 AM    POTASSIUM 3.8 02/17/2025 01:01 AM    CHLORIDE 109 02/17/2025 01:01 AM    CO2 18 (L) 02/17/2025 01:01 AM    GLUCOSE 129 (H) 02/17/2025 01:01 AM    BUN 11 02/17/2025 01:01 AM    CREATININE 0.93 02/17/2025 01:01 AM    CREATININE 0.75 (L) 07/20/2010 11:00 AM    BUNCREATRAT 20.0 01/22/2025 05:10 PM    BUNCREATRAT 19 07/20/2010 11:00 AM    GLOMRATE >59 07/20/2010 11:00 AM      Lab Results   Component Value Date/Time    ASTSGOT 26 02/15/2025 12:44 PM    ALTSGPT 41 02/15/2025 12:44 PM     Lab Results   Component Value Date/Time    CHOLSTRLTOT 198 09/30/2024 07:14 AM     (H) 09/30/2024 07:14 AM    HDL 44 09/30/2024 07:14 AM    TRIGLYCERIDE 175 (H) 09/30/2024 07:14 AM    TROPONINT <6 02/15/2025 08:20 PM       Recent Labs     02/15/25  1244   NTPROBNP 82       Cardiac Imaging and Procedures Review    Echocardiogram:    12/26/2022  Normal left ventricular systolic function.  The left ventricular ejection fraction is visually estimated to be  55%.  Normal right ventricular size and systolic function.  Right heart pressures are normal.    Cardiac Catheterization:  2/17/2025  CORONARY ANGIOGRAPHY:  The left main coronary artery is patent and bifurcates into the left anterior descending and left circumflex coronaries.  The left anterior descending coronary artery is a large, transapical vessel that supplies a moderate first diagonal branch and a small second diagonal branch and has no angiographically significant disease.  The left circumflex coronary artery is a large, nondominant vessel that supplies a small first obtuse marginal branch, a large and bifurcating second obtuse marginal branch, and a small third obtuse marginal branch and has no angiographically significant disease.  The right coronary artery is a large, dominant vessel that supplies a large posterior descending coronary and a large posterolateral branch and has no angiographically significant disease.     IMPRESSION:  Angiographically normal coronary arteries.  Normal left heart filling pressures.    Stress Test:    2/6/2025  Normal left ventricular size, ejection fraction, and wall motion.   Apical wall, apical septum ischemia, summed stress difference score is 6.   ECG INTERPRETATION   Non-diagnostic EKG    Assessment/Plan  No new Assessment & Plan notes have been filed under this hospital service since the last note was generated.  Service: Cardiology    Chest pain in the setting of recent abnormal stress test  -cath showed Angiographically normal coronary arteries.   - Outpatient stress test 2/6/2025 was suggestive of apical wall and apical septum ischemia with an SDS of 6.  - Continue Ranexa 500 mg BID. Could potentially be concern for microvascular CAD.   -Continue metoprolol to 50 mg BID and aspirin 81 mg daily.      I personally spent a total of 10 minutes which includes face-to-face time and non-face-to-face time spent on preparing to see the patient, reviewing hospital notes and  tests, obtaining history from the patient, performing a medically appropriate exam, counseling and educating the patient, ordering medications/tests/procedures/referrals as clinically indicated, and documenting information in the electronic medical record.      Future Appointments   Date Time Provider Department Center   2/25/2025  4:30 PM [facility] 1 [facility]   3/4/2025  4:00 PM Sidney Fowler D.O. [facility] None   3/6/2025  8:45 AM RHIANNON Beard M.D. [facility] None   3/20/2025 11:40 AM Fly Goodson M.D. [address] [facility]       Thank you for allowing me to participate in the care of this patient.  Cardiology will sign off on this patient    Please contact me with any questions.    Raleigh PUGA, Cardiology  [facility], [address]  [address] [address]  Phone: [phone]  Fax: [phone]

## 2025-02-17 NOTE — PROGRESS NOTES
1900: Bedside report received, assumed care of patient at change of shift. Pt resting in bed, breathing even and unlabored, experiencing 8/10 chest pain (see MAR), but otherwise appears in no acute distress. A&O x4. Tele monitoring in place. Fall precautions including bed alarm in place and education provided. Call light within reach, bed locked and in lowest position, denies other needs at this time.

## 2025-02-17 NOTE — CARE PLAN
The patient is Watcher - Medium risk of patient condition declining or worsening    Shift Goals  Clinical Goals: Pain management; monitor HR; promote hemodynamic stability  Patient Goals: Pain management; sleep  Family Goals: JODI    Progress made toward(s) clinical / shift goals:    Problem: Pain - Standard  Goal: Alleviation of pain or a reduction in pain to the patient’s comfort goal  Outcome: Progressing  Patient is experiencing chest pain. Patient is being medicated as neccessary for acute pain. Therapeutic goal is to be able to sleep and move around comfortably.     Problem: Respiratory  Goal: Patient will achieve/maintain optimum respiratory ventilation and gas exchange  Outcome: Progressing     Problem: Communication  Goal: The ability to communicate needs accurately and effectively will improve  Outcome: Progressing     Problem: Hemodynamics  Goal: Patient's hemodynamics, fluid balance and neurologic status will be stable or improve  Outcome: Progressing       Patient is not progressing towards the following goals:

## 2025-02-18 ENCOUNTER — OFFICE VISIT (OUTPATIENT)
Dept: MEDICAL GROUP | Facility: MEDICAL CENTER | Age: 35
End: 2025-02-18
Payer: MEDICARE

## 2025-02-18 ENCOUNTER — PATIENT OUTREACH (OUTPATIENT)
Dept: MEDICAL GROUP | Facility: MEDICAL CENTER | Age: 35
End: 2025-02-18
Payer: MEDICARE

## 2025-02-18 VITALS
OXYGEN SATURATION: 97 % | TEMPERATURE: 98.1 F | BODY MASS INDEX: 44.83 KG/M2 | HEIGHT: 64 IN | WEIGHT: 262.6 LBS | SYSTOLIC BLOOD PRESSURE: 138 MMHG | DIASTOLIC BLOOD PRESSURE: 76 MMHG | HEART RATE: 76 BPM | RESPIRATION RATE: 14 BRPM

## 2025-02-18 DIAGNOSIS — E11.69 TYPE 2 DIABETES MELLITUS WITH OTHER SPECIFIED COMPLICATION, WITHOUT LONG-TERM CURRENT USE OF INSULIN (HCC): ICD-10-CM

## 2025-02-18 DIAGNOSIS — R07.9 CHEST PAIN, UNSPECIFIED TYPE: ICD-10-CM

## 2025-02-18 LAB
HBA1C MFR BLD: 5.8 % (ref ?–5.8)
POCT INT CON NEG: NEGATIVE
POCT INT CON POS: POSITIVE

## 2025-02-18 ASSESSMENT — FIBROSIS 4 INDEX: FIB4 SCORE: 0.73

## 2025-02-18 NOTE — PROGRESS NOTES
Subjective:     Kristin Balderrama is a 34 y.o. female who presents for Hospital Follow-up.    HPI:   Recently hospitalized for \    PMH   # Ehler Danos syndrome,hypermobile  # HTN  # vasospasm  # Restrictive lung disease (PFT 2019 wo significant bronchodilator response)  - has been on inhalers, with reported improvement  # migraine- ( rimegepant, emgality, topiramate, lamotrigine, sumatriptan),   # TONYA - intolerant of mask  # POTS/ SVT on ivabradine and metoprolol 25mg xr  # hidraadenitis supprativa on humira  # nexplanon  # chronic pain associated with EDS ( gabapentin, ibuprofen, acetaminophen),   # GERD/ gastroparesis related to EDS  # PCOS- spironolactone  # schizo on geodon  # hemorrhoids  # history of idiopathic intracranial hypertension  # hx of transverse myelitis vs functional neurologic disorder  # hx of optic neuritits previously on cellcept ([id number]) - no longer following with neuroophthalmology  # kapil 1:80 (2024), ccp 4 (2024), rheum factor <10 (2024)     Specialist  - cardiology - [facility]   - dermatology- [facility]  - on Humira for hidraadenitis   - gastroenterology - [facility], nafld, polyp, colonoscopy   - pain management/ neurology - [facility]  - neuromuscular neurology- [facility]  - ophthalmology- [facility] - EDS visual changes     Device  - interstim stimulator  - Hx of l4-l5 fusion      EDS monitoring/ work up  - 2022 Echocardiogram LVEF 55% - aortic root normal for BSA  - 2022 stress test   - follows with ophthalmology  - hx of spine surgery/ fusion   - CT cervical spine- No acute fracture or dislocation seen in the CT scan of the cervical spine.   - EGD- 2017 dysphagia, gastroparesis  - colonoscopy - 5 year recall 2024, due 2029     ==================================     Since last visit   -Podiatry  -Cardiology for inappropriate sinus tachycardia/palpitation  -Follow-up with cardiology 2/11/2025 for palpitation-CT angio was ordered.  Continue ranolazine 500 mg twice  daily    ===============    Verbal consent was acquired by the patient to use [employer] ambient listening note generation during this visit Yes   History of Present Illness  The patient is a 34-year-old female who presents for a post-hospital visit.    She was hospitalized from 02/15/2025 to 02/17/2025 due to severe left-sided chest pain, rated 8 out of 10, accompanied by nausea, vomiting, and dizziness. During her hospital stay, an EKG and chest x-ray were performed, both of which yielded normal results. Cardiac enzyme levels were also checked, revealing elevated troponin. A consultation with cardiology led to a left heart catheterization, which suggested that her symptoms might be due to arterial spasms. She has been advised to continue taking Ranexa and to use nitroglycerin as needed. She has been under the care of cardiology and has had multiple urgent care visits. Her last cardiology consultation prior to this visit was on 02/11/2025, during which an EKG showed sinus rhythm. She reports that her chest pain is currently mild and intermittent, and she has not required nitroglycerin. She is able to lie flat without difficulty but notes increasing shortness of breath over time. She plans to discuss her condition with her cardiologist. She believes that stress may trigger her symptoms.      Overall patient report her symptoms are much more tolerable.  She denies similar pain that caused her to head to the ER.  Denies shortness of breath dyspnea exertion.  Current medicines (including reconciliation performed today)  Current Outpatient Medications   Medication Sig Dispense Refill    albuterol (PROVENTIL) 2.5mg/3ml Nebu Soln solution for nebulization Take 3 mL by nebulization every four hours as needed for Shortness of Breath. 75 mL 0    aspirin 81 MG EC tablet Take 1 Tablet by mouth every day. 100 Tablet 3    tizanidine (ZANAFLEX) 4 MG Tab Take 4 mg by mouth at bedtime.      docusate sodium (COLACE) 250 MG capsule  Take 250 mg by mouth 2 times a day.      nitroglycerin (NITROSTAT) 0.4 MG SL Tab Place 1 Tablet under the tongue as needed for Chest Pain (may repeat for chest pain every 5 mins not to exceed 3 doses). 25 Tablet 11    ranolazine (RANEXA) 500 MG TABLET SR 12 HR Take 1 Tablet by mouth 2 times a day. 180 Tablet 3    gabapentin (NEURONTIN) 400 MG Cap TAKE 3 CAPSULES BY MOUTH THREE TIMES DAILY. 270 Capsule 0    metoprolol SR (TOPROL XL) 25 MG TABLET SR 24 HR Take 1 tablet by mouth 2 times a day. 180 Tablet 1    spironolactone (ALDACTONE) 50 MG Tab Take 1 Tablet by mouth every day. 90 Tablet 3    omeprazole (PRILOSEC) 20 MG delayed-release capsule Take 2 Capsules by mouth 2 times a day. 180 Capsule 3    topiramate (TOPAMAX) 50 MG tablet Take 1 Tablet by mouth 2 times a day. 180 Tablet 4    scopolamine (TRANSDERM SCOP, 1.5 MG,) 1 mg/72hr PATCH 72 HR Place 1 Patch on the skin every 72 hours. 7 Patch 1    ziprasidone (GEODON) 40 MG Cap Take 1 capsule by mouth at bedtime. 90 Capsule 1    amLODIPine (NORVASC) 5 MG Tab Take 1 tablet by mouth every day. 90 Tablet 1    Galcanezumab-gnlm (EMGALITY) 120 MG/ML Solution Auto-injector Inject 1 mL subcutaneously every month. 1 mL 11    lamoTRIgine (LAMICTAL) 25 MG Tab Take 2 Tablets by mouth 2 times a day. 360 Tablet 1    Rimegepant Sulfate (NURTEC) 75 MG TABLET DISPERSIBLE Take 1 tablet by mouth at onset of migraine or aura okay to repeat in 24 hours if needed. 8 Tablet 5    sumatriptan (IMITREX) 20 MG/ACT nasal spray Give 1 dose at onset headache okay to repeat in 2 hours if needed for max of 2 doses in a 24 hour timeframe. 6 Each 5    ivabradine (CORLANOR) 7.5 MG Tab tablet Take 1 Tablet by mouth 2 times a day with meals. 60 Tablet 3    diphenhydrAMINE (BENADRYL) 25 MG Tab Take 50 mg by mouth at bedtime.      Melatonin 10 MG Tab Take 10 mg by mouth at bedtime.       No current facility-administered medications for this visit.       Allergies:   Cefdinir, Depakote [divalproex  "sodium], Doxycycline, Montelukast [singulair], Vancomycin, Wound dressing adhesive, Amitriptyline, Amoxicillin, Aripiprazole [abilify], Clindamycin, Erythromycin, Flomax [tamsulosin hydrochloride], Hydromorphone, Levaquin, Metformin, Sulfa drugs, Tamsulosin, Tape, Sulfamethoxazole w-trimethoprim, Ciprofloxacin, Keflex, Levofloxacin, and Metronidazole    Social History     Tobacco Use    Smoking status: Never    Smokeless tobacco: Never   Vaping Use    Vaping status: Never Used   Substance Use Topics    Alcohol use: No     Alcohol/week: 0.0 oz    Drug use: Never       ROS:      Objective:     Vitals:    02/18/25 1653   BP: 138/76   Pulse: 76   Resp: 14   Temp: 36.7 °C (98.1 °F)   TempSrc: Temporal   SpO2: 97%   Weight: 119 kg (262 lb 9.6 oz)   Height: 1.626 m (5' 4\")     Body mass index is 45.08 kg/m².    Physical Exam:  Physical Exam  Constitutional:       Appearance: Normal appearance.   Cardiovascular:      Rate and Rhythm: Normal rate and regular rhythm.      Heart sounds: No murmur heard.  Pulmonary:      Effort: Pulmonary effort is normal.      Breath sounds: Normal breath sounds. No wheezing.   Musculoskeletal:      Cervical back: Normal range of motion and neck supple.   Lymphadenopathy:      Cervical: No cervical adenopathy.   Neurological:      Mental Status: She is alert.     Diabetic foot exam: No lesions or calluses noted. 2+ pedal pulses. Sensation intact with 10 out of 10 on monofilament test.        Assessment and Plan:     1. Chest pain, unspecified type  Presents for posthospital follow-up for left-sided chest pressure radiating to the jaw.  EKG without significant finding patient underwent left heart cath without significant obstructive disease.  Does not that the chest pain may be due to coronaries vasospasm patient's continue to take Ranexa for spasm pain.    Since hospital discharge patient report she no longer having episode of extreme chest pain.  She does report she thinks her chest pain may " be stress related.      She has a follow-up with cardiology 3//25    2. Type 2 diabetes mellitus with other specified complication, without long-term current use of insulin ([facility])  A1c 5.8 today, she has a remote history of type 2 diabetes currently well-controlled with diet  - Diabetic Monofilament LE Exam  - POCT Hemoglobin A1C        - Chart and discharge summary were reviewed.   - Hospitalization and results reviewed with patient.   - Medications reviewed including instructions regarding high risk medications, dosing and side effects.  - Recommended Services: No services needed at this time  - Advance directive/POLST on file?  No     Follow-up:Return in about 3 months (around 5/18/2025) for Lab review, Med check.    Face-to-face transitional care management services with HIGH (today's visit is within days post discharge & LACE+ score 59+) medical decision complexity were provided.     LACE+ Historical Score Over Time (0-28: Low, 29-58: Medium, 59+: High): 77

## 2025-02-18 NOTE — PROGRESS NOTES
"Transitional Care Management  TCM Outreach Date and Time: Filed (2/18/2025  8:44 AM)    Discharge Questions  Actual Discharge Date: 02/17/25  Now that you are home, how are you feeling?: Good  Did you receive any new prescriptions?: Yes (asa)  Were you able to get them filled?: Yes  Meds to Bed or Pharmacy filled?: Pharmacy  Do you have any questions about your current medications or new medications (Review Med Rec)?: No  Did you have any durable medical equipment ordered?: No  Appointment Date: 02/18/25  Appointment Time: 1700  Any issues or paperwork you wish to discuss with your PCP?: No  Are you (patient) able to get to the appointment?: Yes  If Home Health was ordered, have they contacted you (Patient): Not Applicable  Did you have enough support after your last discharge?: Yes  Does this patient qualify for the CCM program?: No    Transitional Care  Number of attempts made to contact patient: 1  Current or previous attempts completed within two business days of discharge? : Yes  Provided education regarding treatment plan, medications, self-management, ADLs?: No  Has patient completed an Advanced Directive?: Yes  Is the patient's advanced directive on file?: Yes  Has the Care Manager's phone number provided?: Yes  Is there anything else I can help you with?: No    Discharge Summary  Chief Complaint: Chest Pain        Reports 8/10 \"pressure\" left sided chest pain that radiates into pt's left jaw since 2/14.    Dizziness        Since 2/14       N/V        Since today  Admitting Diagnosis: chest pain  Discharge Diagnosis: Acute chest pain        "

## 2025-02-18 NOTE — LETTER
[facility]  Fly Goodson M.D.  [address] [facility] [address]  [place] [address]  Fax: [phone]   Authorization for Release/Disclosure of   Protected Health Information   Name: KRISTIN DE LA CRUZ : 1990 SSN: [id number]   Address: [address] [place] [address]  [place] [address] Phone:    There are no phone numbers on file.   I authorize the entity listed below to release/disclose the PHI below to:   [facility]/Fly Goodson M.D. and Fly Goodson M.D.   Provider or Entity Name:  [facility]   Address   City, State, Zip   Phone:      Fax:     Reason for request: continuity of care   Information to be released:    [ xxx ] LAST COLONOSCOPY,  including any PATH REPORT and follow-up  [  ] LAST FIT/COLOGUARD RESULT [  ] LAST DEXA  [  ] LAST MAMMOGRAM  [  ] LAST PAP  [  ] LAST LABS [  ] RETINA EXAM REPORT  [  ] IMMUNIZATION RECORDS  [  ] Release all info      [  ] Check here and initial the line next to each item to release ALL health information INCLUDING  _____ Care and treatment for drug and / or alcohol abuse  _____ HIV testing, infection status, or AIDS  _____ Genetic Testing    DATES OF SERVICE OR TIME PERIOD TO BE DISCLOSED: _____________  I understand and acknowledge that:  * This Authorization may be revoked at any time by you in writing, except if your health information has already been used or disclosed.  * Your health information that will be used or disclosed as a result of you signing this authorization could be re-disclosed by the recipient. If this occurs, your re-disclosed health information may no longer be protected by State or Federal laws.  * You may refuse to sign this Authorization. Your refusal will not affect your ability to obtain treatment.  * This Authorization becomes effective upon signing and will  on (date) __________.      If no date is indicated, this Authorization will  one (1) year from the signature date.    Name: Kristin De La Cruz  Signature: Date:   2025      PLEASE FAX REQUESTED RECORDS BACK TO: [phone]

## 2025-02-19 ENCOUNTER — TELEPHONE (OUTPATIENT)
Dept: CARDIOLOGY | Facility: MEDICAL CENTER | Age: 35
End: 2025-02-19
Payer: MEDICARE

## 2025-02-19 ENCOUNTER — RESULTS FOLLOW-UP (OUTPATIENT)
Dept: MEDICAL GROUP | Facility: MEDICAL CENTER | Age: 35
End: 2025-02-19

## 2025-02-19 NOTE — TELEPHONE ENCOUNTER
CTA form signed by  and faxed back to [facility].     Confirmation fax receipt scanned into [employer].

## 2025-02-21 ENCOUNTER — OFFICE VISIT (OUTPATIENT)
Dept: URGENT CARE | Facility: CLINIC | Age: 35
End: 2025-02-21
Payer: MEDICARE

## 2025-02-21 ENCOUNTER — HOSPITAL ENCOUNTER (OUTPATIENT)
Facility: MEDICAL CENTER | Age: 35
End: 2025-02-21
Attending: STUDENT IN AN ORGANIZED HEALTH CARE EDUCATION/TRAINING PROGRAM
Payer: MEDICARE

## 2025-02-21 ENCOUNTER — PHARMACY VISIT (OUTPATIENT)
Dept: PHARMACY | Facility: MEDICAL CENTER | Age: 35
End: 2025-02-21
Payer: COMMERCIAL

## 2025-02-21 VITALS
TEMPERATURE: 98.6 F | SYSTOLIC BLOOD PRESSURE: 112 MMHG | RESPIRATION RATE: 20 BRPM | BODY MASS INDEX: 44.85 KG/M2 | HEIGHT: 64 IN | HEART RATE: 71 BPM | DIASTOLIC BLOOD PRESSURE: 58 MMHG | WEIGHT: 262.7 LBS | OXYGEN SATURATION: 97 %

## 2025-02-21 DIAGNOSIS — R19.7 DIARRHEA, UNSPECIFIED TYPE: ICD-10-CM

## 2025-02-21 DIAGNOSIS — R19.8 UMBILICUS DISCHARGE: ICD-10-CM

## 2025-02-21 LAB — C DIFF TOX GENS STL QL NAA+PROBE: NEGATIVE

## 2025-02-21 PROCEDURE — 87045 FECES CULTURE AEROBIC BACT: CPT

## 2025-02-21 PROCEDURE — 87493 C DIFF AMPLIFIED PROBE: CPT

## 2025-02-21 PROCEDURE — 99214 OFFICE O/P EST MOD 30 MIN: CPT | Mod: 25 | Performed by: STUDENT IN AN ORGANIZED HEALTH CARE EDUCATION/TRAINING PROGRAM

## 2025-02-21 PROCEDURE — 87899 AGENT NOS ASSAY W/OPTIC: CPT

## 2025-02-21 PROCEDURE — RXMED WILLOW AMBULATORY MEDICATION CHARGE: Performed by: STUDENT IN AN ORGANIZED HEALTH CARE EDUCATION/TRAINING PROGRAM

## 2025-02-21 PROCEDURE — 3078F DIAST BP <80 MM HG: CPT | Performed by: STUDENT IN AN ORGANIZED HEALTH CARE EDUCATION/TRAINING PROGRAM

## 2025-02-21 PROCEDURE — 3074F SYST BP LT 130 MM HG: CPT | Performed by: STUDENT IN AN ORGANIZED HEALTH CARE EDUCATION/TRAINING PROGRAM

## 2025-02-21 PROCEDURE — 87077 CULTURE AEROBIC IDENTIFY: CPT

## 2025-02-21 PROCEDURE — 87046 STOOL CULTR AEROBIC BACT EA: CPT

## 2025-02-21 RX ORDER — MUPIROCIN 20 MG/G
1 OINTMENT TOPICAL 2 TIMES DAILY
Qty: 22 G | Refills: 0 | Status: SHIPPED | OUTPATIENT
Start: 2025-02-21

## 2025-02-21 ASSESSMENT — ENCOUNTER SYMPTOMS
HEADACHES: 0
CHILLS: 0
ABDOMINAL PAIN: 0
NAUSEA: 0
BLOOD IN STOOL: 0
VOMITING: 0
DIARRHEA: 1
DIZZINESS: 0
FEVER: 0
CONSTIPATION: 0

## 2025-02-21 ASSESSMENT — FIBROSIS 4 INDEX: FIB4 SCORE: 0.73

## 2025-02-21 NOTE — PROGRESS NOTES
"Subjective     Kristin Balderrama is a 34 y.o. female who presents with Other (X1week belly button redness/yellow foul discharge )            Kristin is a 34 y.o. female who presents to urgent care with bellybutton redness and foul-smelling discharge.  States this has been going on for over a week.  Patient states she tries to clean bellybutton with a Q-tip.  Patient also brings up concerns of diarrhea that she has had for a few days.  Reports recent hospitalization and has had diarrhea since.  Diarrhea is foul-smelling and has mucus.  No blood in stool.  No nausea or vomiting.  States she feels bloated.  No fever/chills.  Tolerating p.o. food/fluids.        Review of Systems   Constitutional:  Negative for chills and fever.   Gastrointestinal:  Positive for diarrhea. Negative for abdominal pain, blood in stool, constipation, melena, nausea and vomiting.   Neurological:  Negative for dizziness and headaches.   All other systems reviewed and are negative.             Objective     /58   Pulse 71   Temp 37 °C (98.6 °F) (Temporal)   Resp 20   Ht 1.626 m (5' 4\")   Wt 119 kg (262 lb 11.2 oz)   SpO2 97%   BMI 45.09 kg/m²      Physical Exam  Vitals reviewed.   Constitutional:       Appearance: Normal appearance.   HENT:      Head: Normocephalic and atraumatic.      Nose: Nose normal.   Eyes:      Extraocular Movements: Extraocular movements intact.      Conjunctiva/sclera: Conjunctivae normal.      Pupils: Pupils are equal, round, and reactive to light.   Cardiovascular:      Rate and Rhythm: Normal rate.   Pulmonary:      Effort: Pulmonary effort is normal.   Abdominal:      General: Abdomen is flat. Bowel sounds are normal. There is no distension.      Palpations: Abdomen is soft. There is no mass.      Tenderness: There is generalized abdominal tenderness. There is no right CVA tenderness, left CVA tenderness, guarding or rebound.      Hernia: No hernia is present.      Comments: Faint erythema inside " umbilicus.  No active drainage.  No appreciated hernia or abscess.   Neurological:      General: No focal deficit present.      Mental Status: She is alert and oriented to person, place, and time.                                  Assessment & Plan  Umbilicus discharge  Patient reports umbilicus no discharge that is foul in odor.  Declines ultrasound at this time.  No hernia or abscess appreciated on physical exam.  Will trial topical Bactroban.  Advised to follow-up with PCP.    Orders:    mupirocin (BACTROBAN) 2 % Ointment; Apply 1 Application topically 2 times a day.    Diarrhea, unspecified type  Recent hospitalization and states she has had diarrhea since she was discharged.  Reports 2-3 episodes of diarrhea a day that is foul-smelling and contains mucus.  Nonacute abdomen on physical exam.    Orders:    CULTURE STOOL; Future    C Diff by PCR rflx Toxin; Future         Differential diagnoses, supportive care measures (rest, hydration, bland/brat diet) and indications for immediate follow-up discussed with patient. Pathogenesis of diagnosis discussed including typical length and natural progression.  Follow-up with PCP.  ER precautions discussed.    Instructed to return to urgent care or nearest emergency department if symptoms fail to improve, for any change in condition, further concerns, or new concerning symptoms.    Patient states understanding and agrees with the plan of care and discharge instructions.               My total time spent caring for the patient on the day of the encounter was 30 minutes including obtain patient history, physical exam, discussing differential diagnosis, plan of care, supportive care, appropriate follow-up, indications for immediate follow-up.This does not include time spent on separately billable procedures/tests.

## 2025-02-22 ENCOUNTER — RESULTS FOLLOW-UP (OUTPATIENT)
Dept: URGENT CARE | Facility: PHYSICIAN GROUP | Age: 35
End: 2025-02-22

## 2025-02-22 LAB
E COLI SXT1+2 STL IA: NORMAL
SIGNIFICANT IND 70042: NORMAL
SITE SITE: NORMAL
SOURCE SOURCE: NORMAL

## 2025-02-25 ENCOUNTER — APPOINTMENT (OUTPATIENT)
Dept: RADIOLOGY | Facility: MEDICAL CENTER | Age: 35
End: 2025-02-25
Attending: ORTHOPAEDIC SURGERY
Payer: MEDICARE

## 2025-02-25 ENCOUNTER — OFFICE VISIT (OUTPATIENT)
Dept: MEDICAL GROUP | Facility: MEDICAL CENTER | Age: 35
End: 2025-02-25
Payer: MEDICARE

## 2025-02-25 VITALS
SYSTOLIC BLOOD PRESSURE: 112 MMHG | TEMPERATURE: 97 F | BODY MASS INDEX: 45.54 KG/M2 | HEIGHT: 64 IN | WEIGHT: 266.76 LBS | HEART RATE: 73 BPM | OXYGEN SATURATION: 97 % | RESPIRATION RATE: 16 BRPM | DIASTOLIC BLOOD PRESSURE: 60 MMHG

## 2025-02-25 DIAGNOSIS — H93.11 TINNITUS OF RIGHT EAR: ICD-10-CM

## 2025-02-25 DIAGNOSIS — H92.01 OTALGIA, RIGHT: ICD-10-CM

## 2025-02-25 PROCEDURE — 3074F SYST BP LT 130 MM HG: CPT | Performed by: NURSE PRACTITIONER

## 2025-02-25 PROCEDURE — RXMED WILLOW AMBULATORY MEDICATION CHARGE: Performed by: NURSE PRACTITIONER

## 2025-02-25 PROCEDURE — 3078F DIAST BP <80 MM HG: CPT | Performed by: NURSE PRACTITIONER

## 2025-02-25 PROCEDURE — 99213 OFFICE O/P EST LOW 20 MIN: CPT | Performed by: NURSE PRACTITIONER

## 2025-02-25 RX ORDER — ACETIC ACID 20.65 MG/ML
SOLUTION AURICULAR (OTIC)
Qty: 15 ML | Refills: 0 | Status: SHIPPED | OUTPATIENT
Start: 2025-02-25

## 2025-02-25 ASSESSMENT — FIBROSIS 4 INDEX: FIB4 SCORE: 0.73

## 2025-02-25 NOTE — PROGRESS NOTES
Subjective:     Kristin Balderrama is a 34 y.o. female presents to discuss:     Chief Complaint   Patient presents with    Otalgia     Started hurting yesterday. Sharp pain in right ear     Verbal consent was acquired by the patient to use [employer] ambient listening note generation during this visit Yes   History of Present Illness  The patient presents for evaluation of right ear pain.    She has been experiencing sharp, persistent pain in her right ear since yesterday.  Patient notes that this is a recurrent issue. She reports no recent illness, travel, or chronic allergies. She reports having a history of chronic ear infections, predominantly affecting the right ear, with occasional bilateral involvement. She reports that she was scheduled to see an ENT specialist today however she notes the appointment was canceled and needs to be rescheduled.  She reports no fever but does experience tinnitus and a sensation of pressure in the affected ear. Her hearing remains intact.     She has been to urgent care several times and been prescribed drops as well as oral antibiotics without complete resolution of symptoms.  Her most recent urgent care visit was on 01/28/2025, during which she completed a 7-day course of Augmentin. She has also tried Ciprodex, but it did not provide relief. She has attempted to alleviate the pain with hot compresses, but these only provide temporary relief. She tells me she is unable to take anti-inflammatories due to her aspirin use.    She also notes going to [facility] and having a CT scan of her head.  We do not have the records at this time however we can request.      She reports no significant nasal congestion. She has attempted to cover her ear during showers to prevent water entry, but this has resulted in discomfort due to increased pressure.     ROS: : see above      Current Outpatient Medications:     acetic acid (VOSOL) 2 % otic solution, Insert saturated wick of  cotton; keep moist 24 hours by adding 3 to 5 drops every 4 to 6 hours. Remove wick after 24 hours and instill 5 drops 3 to 4 times daily x 7 days., Disp: 15 mL, Rfl: 0    mupirocin (BACTROBAN) 2 % Ointment, Apply 1 Application topically 2 times a day., Disp: 22 g, Rfl: 0    albuterol (PROVENTIL) 2.5mg/3ml Nebu Soln solution for nebulization, Take 3 mL by nebulization every four hours as needed for Shortness of Breath., Disp: 75 mL, Rfl: 0    aspirin 81 MG EC tablet, Take 1 Tablet by mouth every day., Disp: 100 Tablet, Rfl: 3    tizanidine (ZANAFLEX) 4 MG Tab, Take 4 mg by mouth at bedtime., Disp: , Rfl:     docusate sodium (COLACE) 250 MG capsule, Take 250 mg by mouth 2 times a day., Disp: , Rfl:     nitroglycerin (NITROSTAT) 0.4 MG SL Tab, Place 1 Tablet under the tongue as needed for Chest Pain (may repeat for chest pain every 5 mins not to exceed 3 doses)., Disp: 25 Tablet, Rfl: 11    ranolazine (RANEXA) 500 MG TABLET SR 12 HR, Take 1 Tablet by mouth 2 times a day., Disp: 180 Tablet, Rfl: 3    gabapentin (NEURONTIN) 400 MG Cap, TAKE 3 CAPSULES BY MOUTH THREE TIMES DAILY., Disp: 270 Capsule, Rfl: 0    metoprolol SR (TOPROL XL) 25 MG TABLET SR 24 HR, Take 1 tablet by mouth 2 times a day., Disp: 180 Tablet, Rfl: 1    spironolactone (ALDACTONE) 50 MG Tab, Take 1 Tablet by mouth every day., Disp: 90 Tablet, Rfl: 3    omeprazole (PRILOSEC) 20 MG delayed-release capsule, Take 2 Capsules by mouth 2 times a day., Disp: 180 Capsule, Rfl: 3    topiramate (TOPAMAX) 50 MG tablet, Take 1 Tablet by mouth 2 times a day., Disp: 180 Tablet, Rfl: 4    scopolamine (TRANSDERM SCOP, 1.5 MG,) 1 mg/72hr PATCH 72 HR, Place 1 Patch on the skin every 72 hours., Disp: 7 Patch, Rfl: 1    ziprasidone (GEODON) 40 MG Cap, Take 1 capsule by mouth at bedtime., Disp: 90 Capsule, Rfl: 1    amLODIPine (NORVASC) 5 MG Tab, Take 1 tablet by mouth every day., Disp: 90 Tablet, Rfl: 1    Galcanezumab-gnlm (EMGALITY) 120 MG/ML Solution Auto-injector,  "Inject 1 mL subcutaneously every month., Disp: 1 mL, Rfl: 11    lamoTRIgine (LAMICTAL) 25 MG Tab, Take 2 Tablets by mouth 2 times a day., Disp: 360 Tablet, Rfl: 1    Rimegepant Sulfate (NURTEC) 75 MG TABLET DISPERSIBLE, Take 1 tablet by mouth at onset of migraine or aura okay to repeat in 24 hours if needed., Disp: 8 Tablet, Rfl: 5    sumatriptan (IMITREX) 20 MG/ACT nasal spray, Give 1 dose at onset headache okay to repeat in 2 hours if needed for max of 2 doses in a 24 hour timeframe., Disp: 6 Each, Rfl: 5    ivabradine (CORLANOR) 7.5 MG Tab tablet, Take 1 Tablet by mouth 2 times a day with meals., Disp: 60 Tablet, Rfl: 3    diphenhydrAMINE (BENADRYL) 25 MG Tab, Take 50 mg by mouth at bedtime., Disp: , Rfl:     Melatonin 10 MG Tab, Take 10 mg by mouth at bedtime., Disp: , Rfl:     Allergies   Allergen Reactions    Cefdinir Shortness of Breath and Itching     Tolerated 1/18/17  Tolerates ceftriaxone  Tolerated augmentin 8/2019     Depakote [Divalproex Sodium] Unspecified     Muscle spasms/muscle pain and weakness      Doxycycline Anaphylaxis and Vomiting     Other reaction(s): pustules/blisters  Other reaction(s): stomach pain    Montelukast [Singulair] Unspecified     Cardiac effusion    Vancomycin Itching     Pt becomes flushed in face and chest.   RXN=7/10/16    Wound Dressing Adhesive Rash     By pt report-\"removes skin totally off\"    Amitriptyline Unspecified     Headaches      Amoxicillin Rash      Tolerates augmentin    Aripiprazole [Abilify] Unspecified     Headaches/muscle twitching      Clindamycin Nausea         Other reaction(s): nausea stomach pain    Erythromycin Rash     .  Other reaction(s): nausea stomach pain    Flomax [Tamsulosin Hydrochloride] Swelling    Hydromorphone      Other reaction(s): vomiting    Levaquin Unspecified     Severe muscle cramps in legs  RXN=unknown  Other reaction(s): leg muscle cramps    Metformin Unspecified     Increased lactic acid     Other reaction(s): itching and " "rash/nausea vomiting    Sulfa Drugs Hives, Itching, Myalgia and Unspecified     Muscle pain and weakness    Other reaction(s): unknown    Tamsulosin Swelling     Swelling of legs    Tape Rash     Tears skin off  coban with Tegaderm tape ok intermittently  RXN=ongoing    Sulfamethoxazole W-Trimethoprim Rash    Ciprofloxacin Rash          Keflex Rash     Pt states she gets a rash with this medication  Tolerates ceftriaxone  Can take with Benadryl    Levofloxacin Unspecified     Leg muscle cramps    Metronidazole Rash     \"Vision problems\"  Other reaction(s): vision problems       Objective:   Vitals: /60   Pulse 73   Temp 36.1 °C (97 °F) (Temporal)   Resp 16   Ht 1.626 m (5' 4\")   Wt 121 kg (266 lb 12.1 oz)   SpO2 97%   BMI 45.79 kg/m²    General: Alert, pleasant, NAD  HEENT: Normocephalic.  Neck supple.  Bilateral EAC free of debris.  Right pinna and contours tender to touch although there is no obvious signs of infection or discharge.  No significant edema.  TM is intact.  Nonbulging, nonerythematous.  Hypersensitive to touch.  Respiratory: no distress, no audible wheezing, RR -WNL  Skin: Warm, dry, no rashes.  Extremities: No leg edema. No discoloration  Neurological: No tremors  Psych:  Affect/mood is normal, judgement is good, memory is intact, grooming is appropriate.  Assessment/Plan:      1. Otalgia, right    2. Tinnitus of right ear    Other orders  - acetic acid (VOSOL) 2 % otic solution; Insert saturated wick of cotton; keep moist 24 hours by adding 3 to 5 drops every 4 to 6 hours. Remove wick after 24 hours and instill 5 drops 3 to 4 times daily x 7 days.  Dispense: 15 mL; Refill: 0       Assessment & Plan  1. Right ear pain.  The patient reports sharp pain in the right ear since yesterday, with a history of chronic ear infections. Previous treatments included eardrops and oral antibiotics, with the last urgent care visit on January 28, where she took Augmentin for 7 days. Examination revealed " no acute signs of infection that would warrant another round of antibiotics. The patient also reports that she has a history of eustachian tube dysfunction which I would recommend gargling with salt water and using Flonase and/or nasal saline.  A prescription for acetic acid drops will be sent to [facility]. She is instructed to keep the ear dry and protected from wind and weather.     A request for her previous CT scan records from [facility] will be made. She should maintain her scheduled appointment with her primary care physician. If the CT scan reveals any abnormalities, an MRI of the head may be considered although at this time defer any advanced imaging to ENT.  Recommend patient call and reschedule.    Return if symptoms worsen or fail to improve.    {I have placed the above orders and discussed them with an approved delegating provider. The MA is performing the below orders under the direction of Dr. Ernie CARO    Health maintenance:    General Recommendations:   Smoking: recommend complete avoidance of all tobacco products  Alcohol: recommend limiting consumption  Physical Activity: goal is 30 min of moderate activity 5 times a week  Weight Management and Nutrition: high vegetable, high protein diet like the Mediterranean diet, portion control, avoid excessive sugars, Low Glycemic Index foods, adequate hydration, sleep.

## 2025-02-25 NOTE — LETTER
[facility]  Fly Goodson M.D.  [address] [facility] [address]  [place] [address]  Fax: [phone]   Authorization for Release/Disclosure of   Protected Health Information   Name: KRISTIN DE LA CRUZ : 1990 SSN: [id number]   Address: [address] [place] [address]  [place] [address] Phone:    There are no phone numbers on file.   I authorize the entity listed below to release/disclose the PHI below to:   [facility]/Fly Goodson M.D. and VANIA Clark   Provider or Entity Name:  [facility].   Address   City, State, [facility]   Phone:      Fax:     Reason for request: continuity of care   Information to be released:    [  ] LAST COLONOSCOPY,  including any PATH REPORT and follow-up   [  ] LAST DEXA  [  ] LAST MAMMOGRAM  [  ] LAST PAP  [  ] LAST LABS [  ] RETINA EXAM REPORT  [  ] IMMUNIZATION RECORDS  [xx ] CT scan of head      _____ Care and treatment for drug and / or alcohol abuse  _____ HIV testing, infection status, or AIDS  _____ Genetic Testing    DATES OF SERVICE OR TIME PERIOD TO BE DISCLOSED: _____________  I understand and acknowledge that:  * This Authorization may be revoked at any time by you in writing, except if your health information has already been used or disclosed.  * Your health information that will be used or disclosed as a result of you signing this authorization could be re-disclosed by the recipient. If this occurs, your re-disclosed health information may no longer be protected by State or Federal laws.  * You may refuse to sign this Authorization. Your refusal will not affect your ability to obtain treatment.  * This Authorization becomes effective upon signing and will  on (date) __________.      If no date is indicated, this Authorization will  one (1) year from the signature date.    Name: Kristin De La Cruz  Signature: Date:   2025     PLEASE FAX REQUESTED RECORDS BACK TO: [phone]

## 2025-03-03 ENCOUNTER — PHARMACY VISIT (OUTPATIENT)
Dept: PHARMACY | Facility: MEDICAL CENTER | Age: 35
End: 2025-03-03
Payer: COMMERCIAL

## 2025-03-03 ENCOUNTER — HOSPITAL ENCOUNTER (OUTPATIENT)
Dept: LAB | Facility: MEDICAL CENTER | Age: 35
End: 2025-03-03
Payer: MEDICARE

## 2025-03-03 DIAGNOSIS — M79.671 FOOT PAIN, BILATERAL: ICD-10-CM

## 2025-03-03 DIAGNOSIS — M79.672 FOOT PAIN, BILATERAL: ICD-10-CM

## 2025-03-03 DIAGNOSIS — Q79.60 EHLERS-DANLOS SYNDROME: Chronic | ICD-10-CM

## 2025-03-03 LAB
ALBUMIN SERPL BCP-MCNC: 4.6 G/DL (ref 3.2–4.9)
ALBUMIN/GLOB SERPL: 2.2 G/DL
ALP SERPL-CCNC: 75 U/L (ref 30–99)
ALT SERPL-CCNC: 44 U/L (ref 2–50)
ANION GAP SERPL CALC-SCNC: 16 MMOL/L (ref 7–16)
AST SERPL-CCNC: 26 U/L (ref 12–45)
BILIRUB SERPL-MCNC: 0.5 MG/DL (ref 0.1–1.5)
BUN SERPL-MCNC: 14 MG/DL (ref 8–22)
CALCIUM ALBUM COR SERPL-MCNC: 8.7 MG/DL (ref 8.5–10.5)
CALCIUM SERPL-MCNC: 9.2 MG/DL (ref 8.5–10.5)
CHLORIDE SERPL-SCNC: 108 MMOL/L (ref 96–112)
CO2 SERPL-SCNC: 15 MMOL/L (ref 20–33)
CREAT SERPL-MCNC: 0.95 MG/DL (ref 0.5–1.4)
GFR SERPLBLD CREATININE-BSD FMLA CKD-EPI: 80 ML/MIN/1.73 M 2
GLOBULIN SER CALC-MCNC: 2.1 G/DL (ref 1.9–3.5)
GLUCOSE SERPL-MCNC: 140 MG/DL (ref 65–99)
INR PPP: 1.03 (ref 0.87–1.13)
POTASSIUM SERPL-SCNC: 3.7 MMOL/L (ref 3.6–5.5)
PROT SERPL-MCNC: 6.7 G/DL (ref 6–8.2)
PROTHROMBIN TIME: 13.5 SEC (ref 12–14.6)
SODIUM SERPL-SCNC: 139 MMOL/L (ref 135–145)

## 2025-03-03 PROCEDURE — 82105 ALPHA-FETOPROTEIN SERUM: CPT | Mod: GA

## 2025-03-03 PROCEDURE — 80053 COMPREHEN METABOLIC PANEL: CPT

## 2025-03-03 PROCEDURE — 85610 PROTHROMBIN TIME: CPT

## 2025-03-03 PROCEDURE — 36415 COLL VENOUS BLD VENIPUNCTURE: CPT

## 2025-03-03 RX ORDER — GABAPENTIN 400 MG/1
CAPSULE ORAL
Qty: 270 CAPSULE | Refills: 0 | Status: SHIPPED | OUTPATIENT
Start: 2025-03-03 | End: 2025-03-23

## 2025-03-04 ENCOUNTER — APPOINTMENT (OUTPATIENT)
Dept: CARDIOLOGY | Facility: MEDICAL CENTER | Age: 35
End: 2025-03-04
Attending: INTERNAL MEDICINE
Payer: MEDICARE

## 2025-03-04 ASSESSMENT — ENCOUNTER SYMPTOMS
PALPITATIONS: 0
DIZZINESS: 0
NEAR-SYNCOPE: 0
PND: 0
SHORTNESS OF BREATH: 0
HEADACHES: 0
SYNCOPE: 0
LIGHT-HEADEDNESS: 0
ORTHOPNEA: 0
DYSPNEA ON EXERTION: 0

## 2025-03-05 ENCOUNTER — OFFICE VISIT (OUTPATIENT)
Dept: CARDIOLOGY | Facility: MEDICAL CENTER | Age: 35
End: 2025-03-05
Payer: MEDICARE

## 2025-03-05 ENCOUNTER — OFFICE VISIT (OUTPATIENT)
Dept: URGENT CARE | Facility: CLINIC | Age: 35
End: 2025-03-05
Payer: MEDICARE

## 2025-03-05 VITALS
HEIGHT: 64 IN | DIASTOLIC BLOOD PRESSURE: 82 MMHG | WEIGHT: 265 LBS | HEART RATE: 69 BPM | BODY MASS INDEX: 45.24 KG/M2 | SYSTOLIC BLOOD PRESSURE: 116 MMHG | TEMPERATURE: 98.2 F | RESPIRATION RATE: 15 BRPM | OXYGEN SATURATION: 97 %

## 2025-03-05 VITALS
BODY MASS INDEX: 45.46 KG/M2 | HEART RATE: 76 BPM | DIASTOLIC BLOOD PRESSURE: 90 MMHG | SYSTOLIC BLOOD PRESSURE: 112 MMHG | OXYGEN SATURATION: 97 % | RESPIRATION RATE: 18 BRPM | HEIGHT: 64 IN

## 2025-03-05 DIAGNOSIS — Z78.9 FALSE POSITIVE CARDIAC STRESS TEST: ICD-10-CM

## 2025-03-05 DIAGNOSIS — R07.89 ATYPICAL CHEST PAIN: ICD-10-CM

## 2025-03-05 DIAGNOSIS — I47.11 INAPPROPRIATE SINUS TACHYCARDIA (HCC): ICD-10-CM

## 2025-03-05 DIAGNOSIS — G90.A POTS (POSTURAL ORTHOSTATIC TACHYCARDIA SYNDROME): ICD-10-CM

## 2025-03-05 DIAGNOSIS — I47.10 SVT (SUPRAVENTRICULAR TACHYCARDIA) (HCC): ICD-10-CM

## 2025-03-05 DIAGNOSIS — H60.501 ACUTE OTITIS EXTERNA OF RIGHT EAR, UNSPECIFIED TYPE: ICD-10-CM

## 2025-03-05 LAB — AFP-TM SERPL-MCNC: 4 NG/ML (ref 0–9)

## 2025-03-05 PROCEDURE — RXMED WILLOW AMBULATORY MEDICATION CHARGE

## 2025-03-05 PROCEDURE — 3074F SYST BP LT 130 MM HG: CPT

## 2025-03-05 PROCEDURE — 99214 OFFICE O/P EST MOD 30 MIN: CPT | Performed by: PHYSICIAN ASSISTANT

## 2025-03-05 PROCEDURE — 99214 OFFICE O/P EST MOD 30 MIN: CPT

## 2025-03-05 PROCEDURE — 99213 OFFICE O/P EST LOW 20 MIN: CPT

## 2025-03-05 PROCEDURE — RXMED WILLOW AMBULATORY MEDICATION CHARGE: Performed by: PHYSICIAN ASSISTANT

## 2025-03-05 PROCEDURE — 3080F DIAST BP >= 90 MM HG: CPT

## 2025-03-05 PROCEDURE — 3079F DIAST BP 80-89 MM HG: CPT | Performed by: PHYSICIAN ASSISTANT

## 2025-03-05 PROCEDURE — 3074F SYST BP LT 130 MM HG: CPT | Performed by: PHYSICIAN ASSISTANT

## 2025-03-05 RX ORDER — RANOLAZINE 1000 MG/1
1000 TABLET, EXTENDED RELEASE ORAL 2 TIMES DAILY
Qty: 180 TABLET | Refills: 3 | Status: SHIPPED | OUTPATIENT
Start: 2025-03-05

## 2025-03-05 RX ORDER — CIPROFLOXACIN AND DEXAMETHASONE 3; 1 MG/ML; MG/ML
4 SUSPENSION/ DROPS AURICULAR (OTIC) 2 TIMES DAILY
Qty: 7.5 ML | Refills: 0 | Status: SHIPPED | OUTPATIENT
Start: 2025-03-05 | End: 2025-03-13

## 2025-03-05 RX ORDER — ISOSORBIDE MONONITRATE 30 MG/1
30 TABLET, EXTENDED RELEASE ORAL EVERY MORNING
Qty: 90 TABLET | Refills: 3 | Status: SHIPPED | OUTPATIENT
Start: 2025-03-05

## 2025-03-05 RX ORDER — METOPROLOL SUCCINATE 50 MG/1
50 TABLET, EXTENDED RELEASE ORAL 2 TIMES DAILY
Qty: 180 TABLET | Refills: 3 | Status: SHIPPED | OUTPATIENT
Start: 2025-03-05

## 2025-03-05 ASSESSMENT — ENCOUNTER SYMPTOMS
EYE REDNESS: 0
FEVER: 0
EYE DISCHARGE: 0

## 2025-03-05 ASSESSMENT — FIBROSIS 4 INDEX: FIB4 SCORE: 0.7

## 2025-03-05 NOTE — PROGRESS NOTES
"Subjective     Kristin Balderrama is a 34 y.o. female who presents with Other (R ear swollen/painful  x7 days)            Patient is a 34-year-old female presents to urgent care with sensation of right ear swelling with associated pain.  Patient reports symptoms have progressively worsened over the last 2 days.  She does report long history of same in the past with similar infections.  Patient has upcoming appointment with ENT next week.  Patient has been attempting acetic acid drops however this is not provided significant improvement.  Patient denies remote history of URI-like symptoms or fevers.  She reports that the swelling is maybe inside the ear.  Denies recent swimming.    Other  Pertinent negatives include no congestion or fever.   Otalgia   There is pain in the right ear. This is a new problem. The current episode started in the past 7 days. The problem occurs constantly. The problem has been gradually worsening. Pertinent negatives include no ear discharge or hearing loss.       Review of Systems   Constitutional:  Negative for fever.   HENT:  Positive for ear pain. Negative for congestion, ear discharge, hearing loss and tinnitus.    Eyes:  Negative for discharge and redness.              Objective     /82   Pulse 69   Temp 36.8 °C (98.2 °F) (Temporal)   Resp 15   Ht 1.626 m (5' 4\")   Wt 120 kg (265 lb)   SpO2 97%   BMI 45.49 kg/m²      Physical Exam  Vitals reviewed.   Constitutional:       General: She is not in acute distress.     Appearance: She is well-developed.   HENT:      Head: Normocephalic and atraumatic.      Ears:      Comments: Auricle nontender tenderness along the right tragus.  Mild swelling to the auditory canal with diffuse tenderness without localized pustule rash or lesion.  TM is clear without erythema, bulging or middle ear effusion.  Suspected periauricular node swelling without tenderness along mastoid, erythema or bogginess.  Parotid, maxillary region along with " TMJ are all nontender.  Eyes:      Conjunctiva/sclera: Conjunctivae normal.      Pupils: Pupils are equal, round, and reactive to light.   Neck:      Trachea: No tracheal deviation.   Cardiovascular:      Rate and Rhythm: Normal rate.   Pulmonary:      Effort: Pulmonary effort is normal.   Musculoskeletal:      Cervical back: Normal range of motion and neck supple.      Comments: Moves all extremities   Skin:     General: Skin is warm.      Comments: No rash to area exposed during the visit today.    Neurological:      Mental Status: She is alert and oriented to person, place, and time.   Psychiatric:         Mood and Affect: Mood normal.         Behavior: Behavior normal.                                  Assessment & Plan  Acute otitis externa of right ear, unspecified type    Orders:    ciprofloxacin/dexamethasone (CIPRODEX) 0.3-0.1 % Suspension; Administer 4 Drops into affected ear(s) 2 times a day for 7 days. (Right)           Will start the patient on the above for suspected otitis externa of the right ear.  No evidence of otitis media.  Even if symptoms improve patient is to still have close follow-up with ENT for further management and discussion about long-term treatment as unfortunately patient has been evaluated several times for similar symptoms in the remote past.      Differential diagnosis, natural history, supportive care, and indications for immediate follow-up discussed. Side effects of OTC or prescribed medications discussed.      Follow-up as needed if symptoms worsen or fail to improve to PCP, Urgent care or Emergency Room.     I have personally reviewed prior external notes and test results pertinent to today's visit.  I have independently reviewed and interpreted all diagnostics ordered during this urgent care acute visit.   Discussed management options (risks,benefits, and alternatives to treatment).    The patient and/or guardian demonstrated a good understanding and agreed with the treatment  plan. And all questions were answered.  Please note that this dictation was created using voice recognition software. I have made a reasonable attempt to correct obvious errors, but I expect that there are errors of grammar and possibly content that I did not discover before finalizing the note.

## 2025-03-05 NOTE — PROGRESS NOTES
Chief Complaint   Patient presents with    Follow-Up     F/V Dx: Postural orthostatic tachycardia syndrome G    Edema     F/V Dx: Bilateral lower extremity edema    Chest Pain     F/V Dx: Atypical chest pain          Subjective:   Kristin Balderrama is a 33 y.o. female who presents today for follow-up.     Patient of Dr. Leon.  Current medical problems include sinus tachycardia, ablation 2016 for sinus node modification for IST (Inappropriate Sinus Tachycardia), IST ablation 2016, POTS on chronic Corlanor and beta-blocker therapy, hypermobility syndrome, TONYA, IIH (idiopathic intracranial hypertension), NPH normal pressure hydrocephalus, optic neuropathy, chronic pain syndrome, bladder incontinence, S/P sacral stimulator, cervical neuralgia with leg weakness, nephrolithiasis S/P kidney stone extraction stent placement 2/14/2024, hip fracture 1/27/2023 H/O psychiatric issues. Their last clinic visit was 2/11/2025 with myself.    At that last follow up patient has had multiple ED visit for continued chest pain. During her Ed workup her troponin and cardiac markers are negative. A stress test was ordered due to risk and continued chest pain. It did show apical wall, apical septum ischemia, summed stress difference score is 6.     Patient presented to the ED on 2/15/2025 for chest pain. After consultation with cardiology she did agree to move forward with cardiac catheterization. Patient underwent LHC with Dr. Bryant on 2/17/2025 which did not show any obstructive CAD. Recommendation for an outpatient cardiac CT for assessment of microvascular disease.    Today's visit:  Patient reports that she has had continuous chest pain since undergoing her left heart cath. She did say after her cath that the ranolazine was helping with the chest pain but she has had increased stress in her life with her grandmother being in the hospital and her aunt having surgery so she has been experiencing increased chest pain which she  "has been taking sublingual nitroglycerin. She does experience dizziness and near syncope with the chest pain. She denies shortness of breath, edema, PND, orthopnea, or syncope. She denies any elevated blood pressures. She is taking her current medication as prescribed.       Cardiovascular Risk Factors:  1. Smoking status: Never  2. Type II Diabetes Mellitus: No   Lab Results   Component Value Date/Time    HBA1C 5.8 02/18/2025 05:02 PM    HBA1C 6.0 (A) 09/23/2024 12:11 PM     3. Hypertension: Yes  4. Dyslipidemia: No   Cholesterol,Tot   Date Value Ref Range Status   01/20/2021 172 100 - 199 mg/dL Final     LDL   Date Value Ref Range Status   01/20/2021 98 <100 mg/dL Final     HDL   Date Value Ref Range Status   01/20/2021 48 >=40 mg/dL Final     Triglycerides   Date Value Ref Range Status   01/20/2021 130 0 - 149 mg/dL Final       Past Medical History:   Diagnosis Date    Abdominal pain     Anginal syndrome     Random chest pain especially with tachycardia    Apnea, sleep     Arthritis     ASTHMA     when around smoke    Back pain     Borderline personality disorder (HCC)     Chickenpox     Chronic UTI 09/18/2010    Daytime sleepiness     Dental disorder 03/08/2021    Infected tooth    Depression     Diabetes (HCC)     Diarrhea     Incontinece    Disorder of thyroid     Hashimoto's    Fatigue     Frequent headaches     Heart burn     History of falling     Inappropriate sinus tachycardia (HCC) 2016    Statusp post ablation by Dr. Kumar.    Migraine     Mitochondrial disease (HCC)     Multiple personality disorder (HCC)     Obesity     Pain     Back, 7/10    Painful joint     PCOS (polycystic ovarian syndrome)     Pneumonia 2012 12/2020    POTS (postural orthostatic tachycardia syndrome)     Psychosis (HCC)     Ringing in ears     Scoliosis     Shortness of breath     O2 as needed    Sinus tachycardia 10/31/2013    Sleep apnea     CPAP \"pulmonary doctor took me off mid year 2016\"    Snoring     Supraventricular " "tachycardia (HCC) 04/10/2019    Transverse myelitis (HCC)     2/8/22: Per pt: not anymore    Tuberculosis     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    Urinary bladder disorder     Suprapubic cath. 2/8/22: Not anymore.     Urinary incontinence     Weakness     Wears glasses          Family History   Problem Relation Age of Onset    Hypertension Mother     Sleep Apnea Mother     Heart Disease Mother     Other Mother         hypothryod    No Known Problems Sister     Other Sister         Narcolepsy;fibromyalsia;bone;nerve    Genitourinary () Problems Sister         endometriosis    Hypertension Maternal Uncle     Heart Disease Maternal Grandmother     Hypertension Maternal Grandmother     Cancer Neg Hx          Social History     Tobacco Use    Smoking status: Never    Smokeless tobacco: Never   Vaping Use    Vaping status: Never Used   Substance Use Topics    Alcohol use: No     Alcohol/week: 0.0 oz    Drug use: Never         Allergies   Allergen Reactions    Cefdinir Shortness of Breath and Itching     Tolerated 1/18/17  Tolerates ceftriaxone  Tolerated augmentin 8/2019     Depakote [Divalproex Sodium] Unspecified     Muscle spasms/muscle pain and weakness      Doxycycline Anaphylaxis and Vomiting     Other reaction(s): pustules/blisters  Other reaction(s): stomach pain    Montelukast [Singulair] Unspecified     Cardiac effusion    Vancomycin Itching     Pt becomes flushed in face and chest.   RXN=7/10/16    Wound Dressing Adhesive Rash     By pt report-\"removes skin totally off\"    Amitriptyline Unspecified     Headaches      Amoxicillin Rash      Tolerates augmentin    Aripiprazole [Abilify] Unspecified     Headaches/muscle twitching      Clindamycin Nausea         Other reaction(s): nausea stomach pain    Erythromycin Rash     .  Other reaction(s): nausea stomach pain    Flomax [Tamsulosin Hydrochloride] Swelling    Hydromorphone      Other reaction(s): vomiting    Levaquin Unspecified     Severe muscle " "cramps in legs  RXN=unknown  Other reaction(s): leg muscle cramps    Metformin Unspecified     Increased lactic acid     Other reaction(s): itching and rash/nausea vomiting    Sulfa Drugs Hives, Itching, Myalgia and Unspecified     Muscle pain and weakness    Other reaction(s): unknown    Tamsulosin Swelling     Swelling of legs    Tape Rash     Tears skin off  coban with Tegaderm tape ok intermittently  RXN=ongoing    Sulfamethoxazole W-Trimethoprim Rash    Ciprofloxacin Rash          Keflex Rash     Pt states she gets a rash with this medication  Tolerates ceftriaxone  Can take with Benadryl    Levofloxacin Unspecified     Leg muscle cramps    Metronidazole Rash     \"Vision problems\"  Other reaction(s): vision problems         Current Outpatient Medications   Medication Sig    ranolazine 1000 MG TABLET SR 12 HR Take 1 Tablet by mouth 2 times a day.    isosorbide mononitrate SR (IMDUR) 30 MG TABLET SR 24 HR Take 1 Tablet by mouth every morning.    metoprolol SR (TOPROL XL) 50 MG TABLET SR 24 HR Take 1 Tablet by mouth 2 times a day.    gabapentin (NEURONTIN) 400 MG Cap TAKE 3 CAPSULES BY MOUTH THREE TIMES DAILY.  FOR 30 DAY SUPPLY. DX: M79.7    tizanidine (ZANAFLEX) 4 MG Tab Take 1 Tablet Three Times A Day as needed QTY 90 for 30 days  Dx: M79.18    acetic acid (VOSOL) 2 % otic solution Insert saturated wick of cotton; keep moist 24 hours by adding 3 to 5 drops every 4 to 6 hours. Remove wick after 24 hours and instill 5 drops 3 to 4 times daily x 7 days.    albuterol (PROVENTIL) 2.5mg/3ml Nebu Soln solution for nebulization Take 3 mL by nebulization every four hours as needed for Shortness of Breath.    aspirin 81 MG EC tablet Take 1 Tablet by mouth every day.    docusate sodium (COLACE) 250 MG capsule Take 250 mg by mouth 2 times a day.    nitroglycerin (NITROSTAT) 0.4 MG SL Tab Place 1 Tablet under the tongue as needed for Chest Pain (may repeat for chest pain every 5 mins not to exceed 3 doses).    " "spironolactone (ALDACTONE) 50 MG Tab Take 1 Tablet by mouth every day.    omeprazole (PRILOSEC) 20 MG delayed-release capsule Take 2 Capsules by mouth 2 times a day.    topiramate (TOPAMAX) 50 MG tablet Take 1 Tablet by mouth 2 times a day.    scopolamine (TRANSDERM SCOP, 1.5 MG,) 1 mg/72hr PATCH 72 HR Place 1 Patch on the skin every 72 hours.    ziprasidone (GEODON) 40 MG Cap Take 1 capsule by mouth at bedtime.    amLODIPine (NORVASC) 5 MG Tab Take 1 tablet by mouth every day.    Galcanezumab-gnlm (EMGALITY) 120 MG/ML Solution Auto-injector Inject 1 mL subcutaneously every month.    lamoTRIgine (LAMICTAL) 25 MG Tab Take 2 Tablets by mouth 2 times a day.    Rimegepant Sulfate (NURTEC) 75 MG TABLET DISPERSIBLE Take 1 tablet by mouth at onset of migraine or aura okay to repeat in 24 hours if needed.    sumatriptan (IMITREX) 20 MG/ACT nasal spray Give 1 dose at onset headache okay to repeat in 2 hours if needed for max of 2 doses in a 24 hour timeframe.    ivabradine (CORLANOR) 7.5 MG Tab tablet Take 1 Tablet by mouth 2 times a day with meals.    diphenhydrAMINE (BENADRYL) 25 MG Tab Take 50 mg by mouth at bedtime.    Melatonin 10 MG Tab Take 10 mg by mouth at bedtime.         Review of Systems   Constitutional: Positive for malaise/fatigue.   Cardiovascular:  Positive for chest pain. Negative for dyspnea on exertion, leg swelling, near-syncope, orthopnea, palpitations, paroxysmal nocturnal dyspnea and syncope.   Respiratory:  Negative for shortness of breath.    Neurological:  Negative for dizziness, headaches and light-headedness.           Objective:   BP (!) 112/90 (BP Location: Left arm, Patient Position: Sitting, BP Cuff Size: Adult)   Pulse 76   Resp 18   Ht 1.626 m (5' 4.02\")   SpO2 97%  Body mass index is 45.46 kg/m².         Physical Exam  Vitals reviewed.   Constitutional:       General: She is not in acute distress.     Appearance: She is obese.   HENT:      Head: Normocephalic and atraumatic. "   Cardiovascular:      Rate and Rhythm: Normal rate and regular rhythm.      Pulses: Normal pulses.      Heart sounds: No murmur heard.  Pulmonary:      Effort: Pulmonary effort is normal. No respiratory distress.      Breath sounds: Normal breath sounds.   Musculoskeletal:      Right lower leg: No edema.      Left lower leg: No edema.   Neurological:      Mental Status: She is alert and oriented to person, place, and time.      Gait: Gait normal.   Psychiatric:         Behavior: Behavior normal.             Lab Results   Component Value Date/Time    SODIUM 139 03/03/2025 06:49 AM    POTASSIUM 3.7 03/03/2025 06:49 AM    CHLORIDE 108 03/03/2025 06:49 AM    CO2 15 (L) 03/03/2025 06:49 AM    GLUCOSE 140 (H) 03/03/2025 06:49 AM    BUN 14 03/03/2025 06:49 AM    CREATININE 0.95 03/03/2025 06:49 AM    CREATININE 0.75 (L) 07/20/2010 11:00 AM    BUNCREATRAT 20.0 01/22/2025 05:10 PM    BUNCREATRAT 19 07/20/2010 11:00 AM    GLOMRATE >59 07/20/2010 11:00 AM      Lab Results   Component Value Date/Time    WBC 5.5 02/17/2025 01:01 AM    WBC 6.1 07/20/2010 11:00 AM    RBC 4.57 02/17/2025 01:01 AM    RBC 4.38 07/20/2010 11:00 AM    HEMOGLOBIN 14.5 02/17/2025 01:01 AM    HEMATOCRIT 43.3 02/17/2025 01:01 AM    MCV 94.7 02/17/2025 01:01 AM    MCV 93 07/20/2010 11:00 AM    MCH 31.7 02/17/2025 01:01 AM    MCH 30.1 07/20/2010 11:00 AM    MCHC 33.5 02/17/2025 01:01 AM    MPV 11.4 02/17/2025 01:01 AM    NEUTSPOLYS 64.90 02/15/2025 12:44 PM    LYMPHOCYTES 22.90 02/15/2025 12:44 PM    MONOCYTES 8.00 02/15/2025 12:44 PM    EOSINOPHILS 3.30 02/15/2025 12:44 PM    BASOPHILS 0.60 02/15/2025 12:44 PM    ANISOCYTOSIS 1+ 10/05/2024 04:35 AM      Lab Results   Component Value Date/Time    CHOLSTRLTOT 198 09/30/2024 07:14 AM     (H) 09/30/2024 07:14 AM    HDL 44 09/30/2024 07:14 AM    TRIGLYCERIDE 175 (H) 09/30/2024 07:14 AM       Lab Results   Component Value Date/Time    TROPONINT 7 07/17/2024 1913     Lab Results   Component Value Date/Time     NTPROBNP 70 07/17/2024 1913     Coronary Angiogram 2/17/2025  HEMODYNAMICS:   Aortic pressure: 112/79 mmHg  Pre-A wave pressure: 9 mmHg  No significant aortic gradient on pullback     CORONARY ANGIOGRAPHY:  The left main coronary artery is patent and bifurcates into the left anterior descending and left circumflex coronaries.  The left anterior descending coronary artery is a large, transapical vessel that supplies a moderate first diagonal branch and a small second diagonal branch and has no angiographically significant disease.  The left circumflex coronary artery is a large, nondominant vessel that supplies a small first obtuse marginal branch, a large and bifurcating second obtuse marginal branch, and a small third obtuse marginal branch and has no angiographically significant disease.  The right coronary artery is a large, dominant vessel that supplies a large posterior descending coronary and a large posterolateral branch and has no angiographically significant disease.     IMPRESSION:  Angiographically normal coronary arteries.  Normal left heart filling pressures.     RECOMMENDATIONS:  Return to floor with continued care per primary service.  TR band release per protocol.  Continue evaluation for chronic chest pain symptoms not due to obstructive epicardial coronary artery disease.  Assessment:   1. Atypical chest pain  - ranolazine 1000 MG TABLET SR 12 HR; Take 1 Tablet by mouth 2 times a day.  Dispense: 180 Tablet; Refill: 3  - isosorbide mononitrate SR (IMDUR) 30 MG TABLET SR 24 HR; Take 1 Tablet by mouth every morning.  Dispense: 90 Tablet; Refill: 3  - [id number] PET W/FLOW RESERVE; Future    2. False positive cardiac stress test    3. Inappropriate sinus tachycardia (HCC)  - metoprolol SR (TOPROL XL) 50 MG TABLET SR 24 HR; Take 1 Tablet by mouth 2 times a day.  Dispense: 180 Tablet; Refill: 3    4. POTS (postural orthostatic tachycardia syndrome)    5. SVT (supraventricular tachycardia)  ([facility])            Medical Decision Making:  Today's Assessment / Plan:   Chest pain  False positive nuclear medicine stress test  -Patient reports increased chest pain with stress with family  -Discussed results of recent LHC which did not show any signs of coronary artery disease or blockages causing chest pain. Discussed evaluate with cardiac PET for microvascular disease  -Increase Ranolazine 1000 mg twice a day  -Increase metoprolol 50 mg twice a day  -If increase in ranolazine and metoprolol does not improve chest pain can try starting Imdur  -Nitroglycerine 0.4 mg as needed for chest pain not to exceed three doses  -Cardiac PET ordered, previous Cardiac CTA to be cancelled due to having LHC  -Discussed pending cardiac PET may need referral to tertiary center for microvascular or vasospasm workup    POTS  Inappropriate sinus tachycardia  -Continue to monitor salt through diet  -Continue corlanor 7.5 mg twice a day  -Continue spironolactone 50 mg daily   -Increase metoprolol 50 mg twice a day  -Blood pressure well controlled, continue to monitor at home    Return in about 4 weeks (around 4/2/2025) for Dr. Smith.  Sooner if problems.    PER Flores.

## 2025-03-05 NOTE — PATIENT INSTRUCTIONS
INCREASE RANEXA to 1000 mg twice a day    INCREASE METOPROLOL 50 mg twice a day    If still having chest pain can start isosorbide mononitrate 30 mg every morning

## 2025-03-06 ENCOUNTER — APPOINTMENT (OUTPATIENT)
Dept: NEUROLOGY | Facility: MEDICAL CENTER | Age: 35
End: 2025-03-06
Attending: SPECIALIST
Payer: MEDICARE

## 2025-03-06 ENCOUNTER — PHARMACY VISIT (OUTPATIENT)
Dept: PHARMACY | Facility: MEDICAL CENTER | Age: 35
End: 2025-03-06
Payer: COMMERCIAL

## 2025-03-06 PROCEDURE — RXMED WILLOW AMBULATORY MEDICATION CHARGE: Performed by: NURSE PRACTITIONER

## 2025-03-06 PROCEDURE — RXMED WILLOW AMBULATORY MEDICATION CHARGE

## 2025-03-06 NOTE — Clinical Note
REFERRAL APPROVAL NOTICE         Sent on March 6, 2025                   Kristin Balderrama  [address]  [place] [address]                   Dear Ms. Balderrama,    After a careful review of the medical information and benefit coverage, [facility] has processed your referral. See below for additional details.    If applicable, you must be actively enrolled with your insurance for coverage of the authorized service. If you have any questions regarding your coverage, please contact your insurance directly.    REFERRAL INFORMATION   Referral #:  [id number]  Referred-To Department    Referred-By Provider:  Physical Therapy    Fly Goodson M.D.   [facility] [address]      [address]  [address]  [place] [address]  [phone] [address]  [address]  [place] [id number] [phone]    Referral Start Date:  03/03/2025  Referral End Date:   03/03/2026             SCHEDULING  If you do not already have an appointment, please call [phone] to make an appointment.     MORE INFORMATION  If you do not already have a [employer] account, sign up at: [email or web address]  You can access your medical information, make appointments, see lab results, billing information, and more.  If you have questions regarding this referral, please contact  the [facility] Referrals department at:             [phone]. Monday - Friday 8:00AM - 5:00PM.     Sincerely,    [facility]

## 2025-03-07 ENCOUNTER — APPOINTMENT (OUTPATIENT)
Dept: RADIOLOGY | Facility: MEDICAL CENTER | Age: 35
End: 2025-03-07
Attending: EMERGENCY MEDICINE
Payer: MEDICARE

## 2025-03-07 ENCOUNTER — HOSPITAL ENCOUNTER (EMERGENCY)
Facility: MEDICAL CENTER | Age: 35
End: 2025-03-07
Attending: EMERGENCY MEDICINE
Payer: MEDICARE

## 2025-03-07 ENCOUNTER — PHARMACY VISIT (OUTPATIENT)
Dept: PHARMACY | Facility: MEDICAL CENTER | Age: 35
End: 2025-03-07
Payer: COMMERCIAL

## 2025-03-07 VITALS
DIASTOLIC BLOOD PRESSURE: 76 MMHG | BODY MASS INDEX: 45.24 KG/M2 | HEIGHT: 64 IN | TEMPERATURE: 97.6 F | SYSTOLIC BLOOD PRESSURE: 133 MMHG | RESPIRATION RATE: 18 BRPM | HEART RATE: 70 BPM | OXYGEN SATURATION: 96 % | WEIGHT: 265 LBS

## 2025-03-07 DIAGNOSIS — S80.12XA CONTUSION OF LEFT LOWER EXTREMITY, INITIAL ENCOUNTER: ICD-10-CM

## 2025-03-07 DIAGNOSIS — M79.662 PAIN OF LEFT LOWER LEG: ICD-10-CM

## 2025-03-07 PROCEDURE — 99284 EMERGENCY DEPT VISIT MOD MDM: CPT

## 2025-03-07 PROCEDURE — RXMED WILLOW AMBULATORY MEDICATION CHARGE: Performed by: EMERGENCY MEDICINE

## 2025-03-07 PROCEDURE — 73700 CT LOWER EXTREMITY W/O DYE: CPT | Mod: LT

## 2025-03-07 PROCEDURE — 700101 HCHG RX REV CODE 250: Mod: UD | Performed by: EMERGENCY MEDICINE

## 2025-03-07 RX ORDER — CELECOXIB 200 MG/1
200 CAPSULE ORAL 2 TIMES DAILY
Qty: 60 CAPSULE | Refills: 0 | Status: SHIPPED | OUTPATIENT
Start: 2025-03-07 | End: 2025-03-23

## 2025-03-07 RX ORDER — LIDOCAINE 4 G/G
1 PATCH TOPICAL ONCE
Status: DISCONTINUED | OUTPATIENT
Start: 2025-03-07 | End: 2025-03-07 | Stop reason: HOSPADM

## 2025-03-07 RX ADMIN — LIDOCAINE PAIN RELIEF 1 PATCH: 560 PATCH TOPICAL at 05:28

## 2025-03-07 ASSESSMENT — FIBROSIS 4 INDEX: FIB4 SCORE: 0.7

## 2025-03-07 NOTE — DISCHARGE INSTRUCTIONS
You have a bone bruise without any evidence of break. Your CT read mentions a problem with your patella but this is unlikely causing any issues as you have a normal exam of your knee. You can walk as tollerated. You can take celebrex as needed for pain. See your PCP

## 2025-03-07 NOTE — ED NOTES
Pt d/c from ED a/o x 4 GCS 15. Pt taken out in wheel chair per request. Pt ambulatory without assistance with steady gait in room. Pt given d/c instructions and verbalized understanding.

## 2025-03-07 NOTE — ED TRIAGE NOTES
Chief Complaint   Patient presents with    Leg Pain     Left leg pain since Monday, progressively getting worst    History bumping to a  on Monday, consulted [facility] and was advised for CT of the leg (scheduled next week)  On Tylenol but is not helping a lot    Given Tylenol 1 gram PO en route  Brought by EMS from home with the above complaints     Pain:  9/10  Ambulatory:  wheelchair  Alert and Oriented: x 4  Oxygen Treatment: No    Pt came in to triage for the above complaints.     Pt is speaking in full sentences, follows commands and responds appropriately to questions.     Respirations are even and unlabored.    Pt placed in lobby. Pt educated on triage process.     Pt encouraged to inform staff for any changes in condition or if needs help while waiting to be room in.    Vitals:    03/07/25 0352   BP: 121/77   Pulse: 67   Resp: 16   Temp: 36.4 °C (97.6 °F)   SpO2: 98%

## 2025-03-07 NOTE — ED NOTES
Report from Yulia HASSAN. Pt GCS 15 a/o x 4. Pt bed locked in lowest position side rail up x 2 call light within reach.

## 2025-03-07 NOTE — ED NOTES
Checked on bed, connected to monitor,  asleepwith unlabored respirations. Vital signs is stable.   Denied any new complaints. No current needs identified.  Gurney in low position, side rail up for pt safety. Call light within reach.

## 2025-03-07 NOTE — ED PROVIDER NOTES
ED Provider Note    CHIEF COMPLAINT  Chief Complaint   Patient presents with    Leg Pain     Left leg pain since Monday, progressively getting worst    History bumping to a  on Monday, consulted [facility] and was advised for CT of the leg (scheduled next week)  On Tylenol but is not helping a lot    Given Tylenol 1 gram PO en route  Brought by EMS from home with the above complaints       EXTERNAL RECORDS REVIEWED  Outpatient Notes [facility], 3/3/2025, patient seen after striking her distal left leg on a .  X-ray was negative, patient was recommended to have outpatient CT and be nonweightbearing until that time.    Patient also seen at cardiology clinic this week 3/5/2025 for management of POTS syndrome, and false positive nuclear medicine stress test.    Patient also seen in urgent care this week 3/5/2025 and diagnosed with otitis externa and started on ciprofloxacinDexamethasone drops-dex drops    HPI/ROS      Kristin Balderrama is a 34 y.o. female who presents with chief complaint of leg pain.  Patient reports leg pain has been present for 5 days.  Patient bumped her leg on the  that day, was seen at [facility] had an x-ray which was unremarkable and was advised to be nonweightbearing and wait for CT which was ordered as an outpatient.  CT is pending, and scheduled for next week.  Patient does have a history of hip fracture of the past.  Patient reports that her pain is ongoing and she is having difficulty ambulating secondary to this pain and therefore came to the emerged part for x-rays and CT.  Patient denies any fevers or chills.  Denies any lower extremity edema.  Patient denies any neck or back pain.  She takes Tylenol for pain.  She takes aspirin daily.  She does not take any other NSAIDs and has not tried any other home remedies for pain management.    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    SURGICAL HISTORY   has a past surgical history that includes neuro dest facet l/s w/ig sngl (04/21/2015); recovery (01/27/2016); [place] by laparoscopy (08/29/2010); lumbar fusion anterior (08/21/2012); other cardiac surgery (01/2016); tonsillectomy; bowel stimulator insertion (07/13/2016); gastroscopy with balloon dilatation (N/A, 01/04/2017); muscle biopsy (Right, 01/26/2017); other abdominal surgery; laminotomy; bowel stimulator insertion (03/10/2021); lap,diagnostic abdomen (02/14/2022); ovarian cystectomy (Right, 02/14/2022); percut fix prox/neck femur fx (Left, 01/28/2023); inguinal hernia laparoscopic (Right, 07/21/2023); hernia repair (Right, 07/21/2023); cystoscopy,insert ureteral stent (Right, 2/12/2024); cysto/uretero/pyeloscopy, dx (Right, 2/12/2024); and lasertripsy (Right, 2/12/2024).    FAMILY HISTORY  Family History   Problem Relation Age of Onset    Hypertension Mother     Sleep Apnea Mother     Heart Disease Mother     Other Mother         hypothryod    No Known Problems Sister     Other Sister         Narcolepsy;fibromyalsia;bone;nerve    Genitourinary () Problems Sister          endometriosis    Hypertension Maternal Uncle     Heart Disease Maternal Grandmother     Hypertension Maternal Grandmother     Cancer Neg Hx        SOCIAL HISTORY  Social History     Tobacco Use    Smoking status: Never    Smokeless tobacco: Never   Vaping Use    Vaping status: Never Used   Substance and Sexual Activity    Alcohol use: No     Alcohol/week: 0.0 oz    Drug use: Never    Sexual activity: Not Currently     Birth control/protection: Implant       CURRENT MEDICATIONS  Home Medications       Reviewed by Gerald Estrada R.N. (Registered Nurse) on 03/07/25 at 0357  Med List Status: Partial     Medication Last Dose Status   acetic acid (VOSOL) 2 % otic solution  Active   albuterol (PROVENTIL) 2.5mg/3ml Nebu Soln solution for nebulization  Active   amLODIPine (NORVASC) 5 MG Tab  Active   aspirin 81 MG EC tablet  Active   ciprofloxacin/dexamethasone (CIPRODEX) 0.3-0.1 % Suspension  Active   diphenhydrAMINE (BENADRYL) 25 MG Tab  Active   docusate sodium (COLACE) 250 MG capsule  Active   gabapentin (NEURONTIN) 400 MG Cap  Active   Galcanezumab-gnlm (EMGALITY) 120 MG/ML Solution Auto-injector  Active   isosorbide mononitrate SR (IMDUR) 30 MG TABLET SR 24 HR  Active   ivabradine (CORLANOR) 7.5 MG Tab tablet  Active   lamoTRIgine (LAMICTAL) 25 MG Tab  Active   Melatonin 10 MG Tab  Active   metoprolol SR (TOPROL XL) 50 MG TABLET SR 24 HR  Active   nitroglycerin (NITROSTAT) 0.4 MG SL Tab  Active   omeprazole (PRILOSEC) 20 MG delayed-release capsule  Active   Ranolazine 1000 MG TABLET SR 12 HR  Active   Rimegepant Sulfate (NURTEC) 75 MG TABLET DISPERSIBLE  Active   scopolamine (TRANSDERM SCOP, 1.5 MG,) 1 mg/72hr PATCH 72 HR  Active   spironolactone (ALDACTONE) 50 MG Tab  Active   sumatriptan (IMITREX) 20 MG/ACT nasal spray  Active   tizanidine (ZANAFLEX) 4 MG Tab  Active   topiramate (TOPAMAX) 50 MG tablet  Active   ziprasidone (GEODON) 40 MG Cap  Active                    ALLERGIES  Allergies   Allergen Reactions     "Cefdinir Shortness of Breath and Itching     Tolerated 1/18/17  Tolerates ceftriaxone  Tolerated augmentin 8/2019     Depakote [Divalproex Sodium] Unspecified     Muscle spasms/muscle pain and weakness      Doxycycline Anaphylaxis and Vomiting     Other reaction(s): pustules/blisters  Other reaction(s): stomach pain    Montelukast [Singulair] Unspecified     Cardiac effusion    Vancomycin Itching     Pt becomes flushed in face and chest.   RXN=7/10/16    Wound Dressing Adhesive Rash     By pt report-\"removes skin totally off\"    Amitriptyline Unspecified     Headaches      Amoxicillin Rash      Tolerates augmentin    Aripiprazole [Abilify] Unspecified     Headaches/muscle twitching      Clindamycin Nausea         Other reaction(s): nausea stomach pain    Erythromycin Rash     .  Other reaction(s): nausea stomach pain    Flomax [Tamsulosin Hydrochloride] Swelling    Hydromorphone      Other reaction(s): vomiting    Levaquin Unspecified     Severe muscle cramps in legs  RXN=unknown  Other reaction(s): leg muscle cramps    Metformin Unspecified     Increased lactic acid     Other reaction(s): itching and rash/nausea vomiting    Sulfa Drugs Hives, Itching, Myalgia and Unspecified     Muscle pain and weakness    Other reaction(s): unknown    Tamsulosin Swelling     Swelling of legs    Tape Rash     Tears skin off  coban with Tegaderm tape ok intermittently  RXN=ongoing    Sulfamethoxazole W-Trimethoprim Rash    Ciprofloxacin Rash          Keflex Rash     Pt states she gets a rash with this medication  Tolerates ceftriaxone  Can take with Benadryl    Levofloxacin Unspecified     Leg muscle cramps    Metronidazole Rash     \"Vision problems\"  Other reaction(s): vision problems       PHYSICAL EXAM  VITAL SIGNS: /77   Pulse 67   Temp 36.4 °C (97.6 °F) (Temporal)   Resp 16   Ht 1.626 m (5' 4\")   Wt 120 kg (265 lb)   SpO2 98%   BMI 45.49 kg/m²    Physical Exam  HENT:      Head: Normocephalic and atraumatic. "   Pulmonary:      Effort: Pulmonary effort is normal.   Musculoskeletal:      Comments: Left lower extremity with focal contusion overlying the anterior lateral aspect of the mid shank.  There is no associated calf tenderness.  There is no associated edema or discrepancy in diameter between bilateral lower extremities.  Distal pulses are 2+.  Sensation intact throughout.  Patient is focally tender overlying this contusion.  There is no significant pain on range of motion of knee ankle or hip.  Patient with antalgic gait   Neurological:      General: No focal deficit present.      Mental Status: She is alert.   Psychiatric:         Mood and Affect: Mood normal.         EKG/LABS    I have independently interpreted this EKG    RADIOLOGY/PROCEDURES   I have independently interpreted the diagnostic imaging associated with this visit and am waiting the final reading from the radiologist.   My preliminary interpretation is as follows: No evidence of fracture.  Questionable patellar subluxation    Radiologist interpretation:  [id number] W/O PLUS RECONS LEFT   Final Result         1.  Lateral subluxation of the patella, consider ligamentous injury. Could be further evaluated with MRI of the knee as clinically appropriate.   2.  No acute fracture appreciated.            COURSE & MEDICAL DECISION MAKING    ASSESSMENT, COURSE AND PLAN  Care Narrative: Patient here, concerned she may have an occult fracture of her lower leg.  I believe this is highly unlikely and discussed this with patient.  An x-ray was checked with the last few days which was negative.  She has likely a small contusion and underlying hematoma that is causing some pain here.  Patient does have a history of connective tissue disorders and therefore is at risk of possible occult fracture though my suspicion remains low.  Patient is adamant that she would like her CT checked sooner rather than later to make sure that she can safely go back to work.  Will  check CT.  Patient home with Celebrex for ongoing pain management.  There is no evidence of DVT on exam.  Pain is localized and focal  Patient CT is unremarkable, patient without any associated knee pain, clinically she does not have evidence of a patellar subluxation therefore I do not believe that this is likely an overread.  Patient home with Celebrex for her pain.  Weightbearing as tolerated. return precautions reviewed                FINAL DIAGNOSIS  1. Pain of left lower leg        2. Contusion of left lower extremity, initial encounter  celecoxib (CELEBREX) 200 MG Cap

## 2025-03-11 PROCEDURE — RXMED WILLOW AMBULATORY MEDICATION CHARGE: Performed by: NURSE PRACTITIONER

## 2025-03-11 PROCEDURE — RXMED WILLOW AMBULATORY MEDICATION CHARGE: Performed by: OTOLARYNGOLOGY

## 2025-03-12 ENCOUNTER — HOSPITAL ENCOUNTER (OUTPATIENT)
Dept: RADIOLOGY | Facility: MEDICAL CENTER | Age: 35
End: 2025-03-12
Attending: INTERNAL MEDICINE
Payer: MEDICARE

## 2025-03-12 ENCOUNTER — PHARMACY VISIT (OUTPATIENT)
Dept: PHARMACY | Facility: MEDICAL CENTER | Age: 35
End: 2025-03-12
Payer: COMMERCIAL

## 2025-03-12 DIAGNOSIS — R07.89 ATYPICAL CHEST PAIN: ICD-10-CM

## 2025-03-12 PROCEDURE — 78434 AQMBF PET REST & RX STRESS: CPT | Mod: 26 | Performed by: INTERNAL MEDICINE

## 2025-03-12 PROCEDURE — 78431 MYOCRD IMG PET RST&STRS CT: CPT | Mod: 26 | Performed by: INTERNAL MEDICINE

## 2025-03-13 ENCOUNTER — APPOINTMENT (OUTPATIENT)
Dept: RADIOLOGY | Facility: MEDICAL CENTER | Age: 35
End: 2025-03-13
Attending: STUDENT IN AN ORGANIZED HEALTH CARE EDUCATION/TRAINING PROGRAM
Payer: MEDICARE

## 2025-03-13 ENCOUNTER — HOSPITAL ENCOUNTER (EMERGENCY)
Facility: MEDICAL CENTER | Age: 35
End: 2025-03-13
Attending: STUDENT IN AN ORGANIZED HEALTH CARE EDUCATION/TRAINING PROGRAM
Payer: MEDICARE

## 2025-03-13 ENCOUNTER — APPOINTMENT (OUTPATIENT)
Dept: RADIOLOGY | Facility: MEDICAL CENTER | Age: 35
End: 2025-03-13
Payer: MEDICARE

## 2025-03-13 VITALS
DIASTOLIC BLOOD PRESSURE: 70 MMHG | SYSTOLIC BLOOD PRESSURE: 147 MMHG | WEIGHT: 264.11 LBS | BODY MASS INDEX: 45.33 KG/M2 | TEMPERATURE: 98.2 F | HEART RATE: 84 BPM | RESPIRATION RATE: 15 BRPM | OXYGEN SATURATION: 94 %

## 2025-03-13 DIAGNOSIS — R07.9 CHEST PAIN, UNSPECIFIED TYPE: Primary | ICD-10-CM

## 2025-03-13 LAB
ALBUMIN SERPL BCP-MCNC: 4.7 G/DL (ref 3.2–4.9)
ALBUMIN/GLOB SERPL: 1.9 G/DL
ALP SERPL-CCNC: 74 U/L (ref 30–99)
ALT SERPL-CCNC: 37 U/L (ref 2–50)
ANION GAP SERPL CALC-SCNC: 14 MMOL/L (ref 7–16)
AST SERPL-CCNC: 23 U/L (ref 12–45)
BASOPHILS # BLD AUTO: 0.7 % (ref 0–1.8)
BASOPHILS # BLD: 0.05 K/UL (ref 0–0.12)
BILIRUB SERPL-MCNC: 0.4 MG/DL (ref 0.1–1.5)
BUN SERPL-MCNC: 18 MG/DL (ref 8–22)
CALCIUM ALBUM COR SERPL-MCNC: 9.5 MG/DL (ref 8.5–10.5)
CALCIUM SERPL-MCNC: 10.1 MG/DL (ref 8.5–10.5)
CHLORIDE SERPL-SCNC: 114 MMOL/L (ref 96–112)
CO2 SERPL-SCNC: 16 MMOL/L (ref 20–33)
CREAT SERPL-MCNC: 1.03 MG/DL (ref 0.5–1.4)
EKG IMPRESSION: NORMAL
EOSINOPHIL # BLD AUTO: 0.13 K/UL (ref 0–0.51)
EOSINOPHIL NFR BLD: 1.9 % (ref 0–6.9)
ERYTHROCYTE [DISTWIDTH] IN BLOOD BY AUTOMATED COUNT: 43.7 FL (ref 35.9–50)
GFR SERPLBLD CREATININE-BSD FMLA CKD-EPI: 73 ML/MIN/1.73 M 2
GLOBULIN SER CALC-MCNC: 2.5 G/DL (ref 1.9–3.5)
GLUCOSE SERPL-MCNC: 114 MG/DL (ref 65–99)
HCG SERPL QL: NEGATIVE
HCT VFR BLD AUTO: 44 % (ref 37–47)
HGB BLD-MCNC: 14.9 G/DL (ref 12–16)
IMM GRANULOCYTES # BLD AUTO: 0.02 K/UL (ref 0–0.11)
IMM GRANULOCYTES NFR BLD AUTO: 0.3 % (ref 0–0.9)
LYMPHOCYTES # BLD AUTO: 2.14 K/UL (ref 1–4.8)
LYMPHOCYTES NFR BLD: 31.1 % (ref 22–41)
MCH RBC QN AUTO: 31.8 PG (ref 27–33)
MCHC RBC AUTO-ENTMCNC: 33.9 G/DL (ref 32.2–35.5)
MCV RBC AUTO: 93.8 FL (ref 81.4–97.8)
MONOCYTES # BLD AUTO: 0.71 K/UL (ref 0–0.85)
MONOCYTES NFR BLD AUTO: 10.3 % (ref 0–13.4)
NEUTROPHILS # BLD AUTO: 3.83 K/UL (ref 1.82–7.42)
NEUTROPHILS NFR BLD: 55.7 % (ref 44–72)
NRBC # BLD AUTO: 0 K/UL
NRBC BLD-RTO: 0 /100 WBC (ref 0–0.2)
NT-PROBNP SERPL IA-MCNC: 88 PG/ML (ref 0–125)
PLATELET # BLD AUTO: 224 K/UL (ref 164–446)
PMV BLD AUTO: 11.5 FL (ref 9–12.9)
POTASSIUM SERPL-SCNC: 4.1 MMOL/L (ref 3.6–5.5)
PROT SERPL-MCNC: 7.2 G/DL (ref 6–8.2)
RBC # BLD AUTO: 4.69 M/UL (ref 4.2–5.4)
SODIUM SERPL-SCNC: 144 MMOL/L (ref 135–145)
TROPONIN T SERPL-MCNC: 11 NG/L (ref 6–19)
TROPONIN T SERPL-MCNC: <6 NG/L (ref 6–19)
WBC # BLD AUTO: 6.9 K/UL (ref 4.8–10.8)

## 2025-03-13 PROCEDURE — 700102 HCHG RX REV CODE 250 W/ 637 OVERRIDE(OP): Mod: UD | Performed by: STUDENT IN AN ORGANIZED HEALTH CARE EDUCATION/TRAINING PROGRAM

## 2025-03-13 PROCEDURE — 93005 ELECTROCARDIOGRAM TRACING: CPT | Mod: TC | Performed by: STUDENT IN AN ORGANIZED HEALTH CARE EDUCATION/TRAINING PROGRAM

## 2025-03-13 PROCEDURE — 96372 THER/PROPH/DIAG INJ SC/IM: CPT

## 2025-03-13 PROCEDURE — 84484 ASSAY OF TROPONIN QUANT: CPT | Mod: 91

## 2025-03-13 PROCEDURE — 99285 EMERGENCY DEPT VISIT HI MDM: CPT

## 2025-03-13 PROCEDURE — 700111 HCHG RX REV CODE 636 W/ 250 OVERRIDE (IP): Performed by: STUDENT IN AN ORGANIZED HEALTH CARE EDUCATION/TRAINING PROGRAM

## 2025-03-13 PROCEDURE — 71045 X-RAY EXAM CHEST 1 VIEW: CPT

## 2025-03-13 PROCEDURE — 83880 ASSAY OF NATRIURETIC PEPTIDE: CPT

## 2025-03-13 PROCEDURE — 80053 COMPREHEN METABOLIC PANEL: CPT

## 2025-03-13 PROCEDURE — A9270 NON-COVERED ITEM OR SERVICE: HCPCS | Mod: UD | Performed by: STUDENT IN AN ORGANIZED HEALTH CARE EDUCATION/TRAINING PROGRAM

## 2025-03-13 PROCEDURE — 84703 CHORIONIC GONADOTROPIN ASSAY: CPT

## 2025-03-13 PROCEDURE — 93018 CV STRESS TEST I&R ONLY: CPT | Performed by: INTERNAL MEDICINE

## 2025-03-13 PROCEDURE — 36415 COLL VENOUS BLD VENIPUNCTURE: CPT

## 2025-03-13 PROCEDURE — 93005 ELECTROCARDIOGRAM TRACING: CPT | Mod: TC

## 2025-03-13 PROCEDURE — 85025 COMPLETE CBC W/AUTO DIFF WBC: CPT

## 2025-03-13 RX ORDER — KETOROLAC TROMETHAMINE 15 MG/ML
15 INJECTION, SOLUTION INTRAMUSCULAR; INTRAVENOUS ONCE
Status: COMPLETED | OUTPATIENT
Start: 2025-03-13 | End: 2025-03-13

## 2025-03-13 RX ORDER — KETOROLAC TROMETHAMINE 15 MG/ML
15 INJECTION, SOLUTION INTRAMUSCULAR; INTRAVENOUS ONCE
Status: DISCONTINUED | OUTPATIENT
Start: 2025-03-13 | End: 2025-03-13

## 2025-03-13 RX ORDER — OXYCODONE HYDROCHLORIDE 5 MG/1
5 TABLET ORAL ONCE
Refills: 0 | Status: COMPLETED | OUTPATIENT
Start: 2025-03-13 | End: 2025-03-13

## 2025-03-13 RX ADMIN — OXYCODONE HYDROCHLORIDE 5 MG: 5 TABLET ORAL at 19:39

## 2025-03-13 RX ADMIN — KETOROLAC TROMETHAMINE 15 MG: 15 INJECTION, SOLUTION INTRAMUSCULAR; INTRAVENOUS at 22:10

## 2025-03-13 ASSESSMENT — FIBROSIS 4 INDEX: FIB4 SCORE: 0.7

## 2025-03-14 ENCOUNTER — RESULTS FOLLOW-UP (OUTPATIENT)
Dept: CARDIOLOGY | Facility: MEDICAL CENTER | Age: 35
End: 2025-03-14
Payer: MEDICARE

## 2025-03-14 ENCOUNTER — APPOINTMENT (OUTPATIENT)
Dept: RADIOLOGY | Facility: MEDICAL CENTER | Age: 35
End: 2025-03-14
Attending: PHYSICIAN ASSISTANT
Payer: MEDICARE

## 2025-03-14 ASSESSMENT — HEART SCORE
AGE: <45
TROPONIN: LESS THAN OR EQUAL TO NORMAL LIMIT
HEART SCORE: 1
ECG: NORMAL
RISK FACTORS: 1-2 RISK FACTORS
HISTORY: SLIGHTLY SUSPICIOUS

## 2025-03-14 NOTE — ED NOTES
Pt was called in reference to the following message from Dr. Baer: She does have metastatic disease, and I would favor reinitiation of therapy now if the surgery is not scheduled until 4/30.  Oncology team, can we have her do 1 more 3-week cycle, and then discontinue treatment in anticipation of the 4/30 surgical date.  She will likely have to hold Xeloda after the surgery as well.  Car      Left message to inform that since surgery is postponed to 4/30-Dr. Baer would like her to take another cycle of capecitabine.  Left clinic number for her to call to discuss if she needs another cycle ordered   ophthalmologist at pt's bedside

## 2025-03-14 NOTE — ED NOTES
Pt discharged to home. Discharge paperwork provided. Education provided by ERP. Reinforced discharge instructions.  Pt was given follow up instructions and prescriptions.  Pt verbalized understanding of all instructions for discharge.   Patient went out of the ER ambulatory with steady gait., alert and oriented x 4, with all belongings.     Pt reports grabbing a cab for safe transport home

## 2025-03-14 NOTE — DISCHARGE INSTRUCTIONS
You were seen in the emergency room for evaluation of chest pain.  Your labs are all reassuring.  I did discuss with your case with our cardiologist on-call who recommends outpatient follow-up.  Return if your pain is worse.  Continue your medications.

## 2025-03-14 NOTE — ED PROVIDER NOTES
ED Provider Note    CHIEF COMPLAINT  Chief Complaint   Patient presents with    Chest Pain    Shortness of Breath       EXTERNAL RECORDS REVIEWED  Outpatient Notes patient was seen by cardiology on March 5, 2025 for atypical chest pain.  Patient had a left heart catheterization on February 17, 2025 which showed no obstructive CAD.  There is recommendation for outpatient cardiac CT for assessment of microvascular disease.  At the visit, patient had noted that she had continuous chest pain since undergoing her left heart cath.  This was attributed to increased rest in her life with her grandmother being in the hospital and another aunt having surgery.  They increased her Ranolazine and metoprolol with plan to start Imdur    HPI/ROS  LIMITATION TO HISTORY   Select: : None  OUTSIDE HISTORIAN(S):  None    Kristin Balderrama is a 34 y.o. female who presents for evaluation of left-sided chest pain.  Patient states that this is the same pain that she has been dealing with and following up with cardiology.  She notes that she has been compliant with her medications.  She states that she saw cardiology approximately 9 days ago, they increased her medication and she had been doing fine.  She states that today she had a very stressful day at work and the pain got worse.  It is not changed with exertion.  She has some shortness of breath.  She denies diaphoresis, nausea, vomiting.  She had a cardiac PET done yesterday and she states that whenever she has a stress test this typically makes her pain worse.  She took 3 doses of nitroglycerin today and her pain did not change therefore she decided come to the hospital.  She notes that she is under a lot of stress in life as she has multiple sick family members and she is also having some issues with her legs that are causing her to have pain. She has taken tylenol due to leg pain today.    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    SURGICAL HISTORY   has a past surgical history that includes neuro dest facet l/s w/ig sngl (04/21/2015); recovery (01/27/2016); [place] by laparoscopy (08/29/2010); lumbar fusion anterior (08/21/2012); other cardiac surgery (01/2016); tonsillectomy; bowel stimulator insertion (07/13/2016); gastroscopy with balloon dilatation (N/A, 01/04/2017); muscle biopsy (Right, 01/26/2017); other abdominal surgery; laminotomy; bowel stimulator insertion (03/10/2021); lap,diagnostic abdomen (02/14/2022); ovarian cystectomy (Right, 02/14/2022); percut fix prox/neck femur fx (Left, 01/28/2023); inguinal hernia laparoscopic (Right, 07/21/2023); hernia repair (Right, 07/21/2023); cystoscopy,insert ureteral stent (Right, 2/12/2024); cysto/uretero/pyeloscopy, dx (Right, 2/12/2024); and lasertripsy (Right, 2/12/2024).    FAMILY HISTORY  Family History   Problem Relation Age of Onset    Hypertension Mother     Sleep Apnea Mother     Heart Disease Mother     Other Mother         hypothryod    No Known Problems Sister     Other Sister         Narcolepsy;fibromyalsia;bone;nerve    Genitourinary () Problems  Sister         endometriosis    Hypertension Maternal Uncle     Heart Disease Maternal Grandmother     Hypertension Maternal Grandmother     Cancer Neg Hx        SOCIAL HISTORY  Social History     Tobacco Use    Smoking status: Never    Smokeless tobacco: Never   Vaping Use    Vaping status: Never Used   Substance and Sexual Activity    Alcohol use: No     Alcohol/week: 0.0 oz    Drug use: Never    Sexual activity: Not Currently     Birth control/protection: Implant       CURRENT MEDICATIONS  Home Medications       Reviewed by Consuelo Cabezas R.N. (Registered Nurse) on 03/13/25 at 1743  Med List Status: Partial     Medication Last Dose Status   acetic acid (VOSOL) 2 % otic solution  Active   albuterol (PROVENTIL) 2.5mg/3ml Nebu Soln solution for nebulization  Active   amLODIPine (NORVASC) 5 MG Tab  Active   aspirin 81 MG EC tablet  Active   celecoxib (CELEBREX) 200 MG Cap  Active   ciprofloxacin/dexamethasone (CIPRODEX) 0.3-0.1 % Suspension  Active   clotrimazole-betamethasone (LOTRISONE) 1-0.05 % Cream  Active   diphenhydrAMINE (BENADRYL) 25 MG Tab  Active   docusate sodium (COLACE) 250 MG capsule  Active   gabapentin (NEURONTIN) 400 MG Cap  Active   Galcanezumab-gnlm (EMGALITY) 120 MG/ML Solution Auto-injector  Active   isosorbide mononitrate SR (IMDUR) 30 MG TABLET SR 24 HR  Active   ivabradine (CORLANOR) 7.5 MG Tab tablet  Active   lamoTRIgine (LAMICTAL) 25 MG Tab  Active   Melatonin 10 MG Tab  Active   metoprolol SR (TOPROL XL) 50 MG TABLET SR 24 HR  Active   nitroglycerin (NITROSTAT) 0.4 MG SL Tab  Active   omeprazole (PRILOSEC) 20 MG delayed-release capsule  Active   Ranolazine 1000 MG TABLET SR 12 HR  Active   Rimegepant Sulfate (NURTEC) 75 MG TABLET DISPERSIBLE  Active   scopolamine (TRANSDERM SCOP, 1.5 MG,) 1 mg/72hr PATCH 72 HR  Active   spironolactone (ALDACTONE) 50 MG Tab  Active   sumatriptan (IMITREX) 20 MG/ACT nasal spray  Active   tizanidine (ZANAFLEX) 4 MG Tab  Active   topiramate (TOPAMAX) 50  "MG tablet  Active   ziprasidone (GEODON) 40 MG Cap  Active                    ALLERGIES  Allergies   Allergen Reactions    Cefdinir Shortness of Breath and Itching     Tolerated 1/18/17  Tolerates ceftriaxone  Tolerated augmentin 8/2019     Depakote [Divalproex Sodium] Unspecified     Muscle spasms/muscle pain and weakness      Doxycycline Anaphylaxis and Vomiting     Other reaction(s): pustules/blisters  Other reaction(s): stomach pain    Montelukast [Singulair] Unspecified     Cardiac effusion    Vancomycin Itching     Pt becomes flushed in face and chest.   RXN=7/10/16    Wound Dressing Adhesive Rash     By pt report-\"removes skin totally off\"    Amitriptyline Unspecified     Headaches      Amoxicillin Rash      Tolerates augmentin    Aripiprazole [Abilify] Unspecified     Headaches/muscle twitching      Clindamycin Nausea         Other reaction(s): nausea stomach pain    Erythromycin Rash     .  Other reaction(s): nausea stomach pain    Flomax [Tamsulosin Hydrochloride] Swelling    Hydromorphone      Other reaction(s): vomiting    Levaquin Unspecified     Severe muscle cramps in legs  RXN=unknown  Other reaction(s): leg muscle cramps    Metformin Unspecified     Increased lactic acid     Other reaction(s): itching and rash/nausea vomiting    Sulfa Drugs Hives, Itching, Myalgia and Unspecified     Muscle pain and weakness    Other reaction(s): unknown    Tamsulosin Swelling     Swelling of legs    Tape Rash     Tears skin off  coban with Tegaderm tape ok intermittently  RXN=ongoing    Sulfamethoxazole W-Trimethoprim Rash    Ciprofloxacin Rash          Keflex Rash     Pt states she gets a rash with this medication  Tolerates ceftriaxone  Can take with Benadryl    Levofloxacin Unspecified     Leg muscle cramps    Metronidazole Rash     \"Vision problems\"  Other reaction(s): vision problems       PHYSICAL EXAM  VITAL SIGNS: BP (!) 147/70   Pulse 84   Temp 36.8 °C (98.2 °F) (Temporal)   Resp 15   Wt 120 kg (264 lb " 1.8 oz)   SpO2 94%   BMI 45.33 kg/m²      Constitutional: Well developed, Well nourished, No acute distress, Non-toxic appearance.   HEENT: Normocephalic, Atraumatic,  external ears normal, pharynx pink,  Mucous  Membranes moist, No rhinorrhea or mucosal edema  Eyes: PERRL, EOMI, Conjunctiva normal, No discharge.   Neck: Normal range of motion, No tenderness, Supple, No stridor.   Cardiovascular: Regular Rate and Rhythm, No murmurs,  rubs, or gallops.   Thorax & Lungs: Lungs clear to auscultation bilaterally, No respiratory distress, No wheezes, rhales or rhonchi, No chest wall tenderness.   Abdomen: Bowel sounds normal, Soft, non tender, non distended,  No pulsatile masses., no rebound guarding or peritoneal signs.   Skin: Warm, Dry, No erythema, No rash,   Back:  No CVA tenderness,  No spinal tenderness, bony crepitance step offs or instability.   Extremities: Equal, intact distal pulses  Musculoskeletal: Good range of motion in all major joints. No tenderness to palpation or major deformities noted.   Neurologic: Alert & oriented No focal deficits noted.  Psychiatric: Affect normal, Judgment normal, Mood normal.      EKG/LABS  Results for orders placed or performed during the hospital encounter of 03/13/25   CBC with Differential    Collection Time: 03/13/25  5:50 PM   Result Value Ref Range    WBC 6.9 4.8 - 10.8 K/uL    RBC 4.69 4.20 - 5.40 M/uL    Hemoglobin 14.9 12.0 - 16.0 g/dL    Hematocrit 44.0 37.0 - 47.0 %    MCV 93.8 81.4 - 97.8 fL    MCH 31.8 27.0 - 33.0 pg    MCHC 33.9 32.2 - 35.5 g/dL    RDW 43.7 35.9 - 50.0 fL    Platelet Count 224 164 - 446 K/uL    MPV 11.5 9.0 - 12.9 fL    Neutrophils-Polys 55.70 44.00 - 72.00 %    Lymphocytes 31.10 22.00 - 41.00 %    Monocytes 10.30 0.00 - 13.40 %    Eosinophils 1.90 0.00 - 6.90 %    Basophils 0.70 0.00 - 1.80 %    Immature Granulocytes 0.30 0.00 - 0.90 %    Nucleated RBC 0.00 0.00 - 0.20 /100 WBC    Neutrophils (Absolute) 3.83 1.82 - 7.42 K/uL    Lymphs  (Absolute) 2.14 1.00 - 4.80 K/uL    Monos (Absolute) 0.71 0.00 - 0.85 K/uL    Eos (Absolute) 0.13 0.00 - 0.51 K/uL    Baso (Absolute) 0.05 0.00 - 0.12 K/uL    Immature Granulocytes (abs) 0.02 0.00 - 0.11 K/uL    NRBC (Absolute) 0.00 K/uL   Complete Metabolic Panel (CMP)    Collection Time: 25  5:50 PM   Result Value Ref Range    Sodium 144 135 - 145 mmol/L    Potassium 4.1 3.6 - 5.5 mmol/L    Chloride 114 (H) 96 - 112 mmol/L    Co2 16 (L) 20 - 33 mmol/L    Anion Gap 14.0 7.0 - 16.0    Glucose 114 (H) 65 - 99 mg/dL    Bun 18 8 - 22 mg/dL    Creatinine 1.03 0.50 - 1.40 mg/dL    Calcium 10.1 8.5 - 10.5 mg/dL    Correct Calcium 9.5 8.5 - 10.5 mg/dL    AST(SGOT) 23 12 - 45 U/L    ALT(SGPT) 37 2 - 50 U/L    Alkaline Phosphatase 74 30 - 99 U/L    Total Bilirubin 0.4 0.1 - 1.5 mg/dL    Albumin 4.7 3.2 - 4.9 g/dL    Total Protein 7.2 6.0 - 8.2 g/dL    Globulin 2.5 1.9 - 3.5 g/dL    A-G Ratio 1.9 g/dL   proBrain Natriuretic Peptide, NT (BNP_    Collection Time: 25  5:50 PM   Result Value Ref Range    NT-proBNP 88 0 - 125 pg/mL   Troponins NOW    Collection Time: 25  5:50 PM   Result Value Ref Range    Troponin T 11 6 - 19 ng/L   HCG Qual Serum    Collection Time: 25  5:50 PM   Result Value Ref Range    Beta-Hcg Qualitative Serum Negative Negative   ESTIMATED GFR    Collection Time: 25  5:50 PM   Result Value Ref Range    GFR (CKD-EPI) 73 >60 mL/min/1.73 m 2   Troponins in two (2) hours    Collection Time: 25  8:43 PM   Result Value Ref Range    Troponin T <6 6 - 19 ng/L   EKG    Collection Time: 25  9:58 PM   Result Value Ref Range    Report       [facility] Emergency Dept.    Test Date:  2025  Pt Name:    HARLEY DE LA CRUZ              Department: ER  MRN:        [id number]                      Room:  Gender:     Female                       Technician: [id number]  :        [id number]                   Requested By:ER TRIAGE PROTOCOL  Order #:    [id number]                     Reading MD: Luz Maria Sanchez    Measurements  Intervals                                Axis  Rate:       86                           P:          54  [place]:         149                          QRS:        13  QRSD:       95                           T:          24  QT:         385  QTc:        461    Interpretive Statements  Sinus rhythm  Borderline T abnormalities, anterior leads  No ST elevation  Compared to ECG 02/15/2025 11:55:39  No significant changes  Electronically Signed On 03- 21:58:41 PDT by Luz Maria Sanchez       *Note: Due to a large number of results and/or encounters for the requested time period, some results have not been displayed. A complete set of results can be found in Results Review.       I have independently interpreted this EKG    RADIOLOGY/PROCEDURES   I have independently interpreted the diagnostic imaging associated with this visit and am waiting the final reading from the radiologist.   My preliminary interpretation is as follows: no focal consolidation    Radiologist interpretation:  DX-CHEST-PORTABLE (1 VIEW)   Final Result      Cardiomegaly.          COURSE & MEDICAL DECISION MAKING    ASSESSMENT, COURSE AND PLAN  Care Narrative:   This is a 34-year-old female with history as noted above who is presenting for evaluation of ongoing chest pain.  On arrival, her vital signs are stable.  She is nontoxic in appearance.  This is the same chest pain that she has been dealing with and she follows with cardiology for.  She had a left heart catheterization done 1 month ago which showed no evidence of obstructing coronary artery disease.  She just had a cardiac PET scan done yesterday to assess for microvascular disease as she continues to have ongoing chest pain.  She suspects that the injection that she was given for this has led to her pain increasing as this has happened in the past.  She is also under an immense amount of stress due to sick family members and work with  stressful today.  She is also having some orthopedic issues.    I do not think that her symptoms are consistent with a PE given she has had this pain chronically.  She is not tachycardic or hypoxic.  Her EKG demonstrates no acute ischemic changes.  Initial troponin 11 on a repeat is less than 6.  Her NT proBNP is not elevated and I doubt new onset heart failure.  Chest x-ray demonstrates no focal consolidations.    9:58 PM I did discuss with cardiology on-call, Dr. Lindo, as I did review the patient's PET/CT Cardiac done yesterday which did show signs concerning of large scarring mild ischemia in the apical and mid inferior segments.  She has normal left ventricular wall motion and her LV EF is 73%.  Patient recently had catheterization 1 month ago which demonstrated no obstructive CAD.  Her delta troponin is 11 to less than 6. Dr. Lindo states that no further testing inpatient is indicated at this time, recommends outpatient follow up.     I discussed my conversation with the cardiologist with the patient.  Patient will be discharged home at this time.  She will follow-up as scheduled.  Strict return precautions were discussed.  Patient is agreeable to discharge plan no other questions.    CHEST PAIN:   HEART Score for Major Cardiac Events  HEART Score     History: Slightly suspicious  ECG: Normal  Age: <45  Risk Factors: 1-2 risk factors  Troponin: Less than or equal to normal limit    Heart Score: 1    Total Score   0-3 Points = Low Score, risk of MACE 0.9-1.7%.  4-6 Points = Moderate Score, risk of MACE 12-16.6%  7-10 Points = High Score, risk of MACE 50-65%          ADDITIONAL PROBLEMS MANAGED  None    DISPOSITION AND DISCUSSIONS  I have discussed management of the patient with the following physicians and ARIES's:    Cardiology - Dr. Lindo    Discussion of management with other Q or appropriate source(s): None     Escalation of care considered, and ultimately not performed:acute inpatient care management,  however at this time, the patient is most appropriate for outpatient management    Barriers to care at this time, including but not limited to:  None .     Decision tools and prescription drugs considered including, but not limited to:  None .     The patient will return for new or worsening symptoms and is stable at the time of discharge.    The patient is referred to a primary physician for blood pressure management, diabetic screening, and for all other preventative health concerns.    DISPOSITION:  Patient will be discharged home in stable condition.    FOLLOW UP:  Fly Goodson M.D.  [address] [place] [id number]  [phone]          [facility], Emergency Dept  [address] [place] [id number]  [phone]        [facility]  [address] [place] [id number]  [phone]          OUTPATIENT MEDICATIONS:  Discharge Medication List as of 3/13/2025 10:08 PM            FINAL DIAGNOSIS  1. Chest pain, unspecified type Acute        Electronically signed by: Luz Maria Sanchez M.D., 3/13/2025 6:56 PM

## 2025-03-14 NOTE — ED TRIAGE NOTES
"Chief Complaint   Patient presents with    Chest Pain    Shortness of Breath       Pt reports nitro not working, symptoms worsening throughout the day. States that she initially \"tried to ignore it,\"  /81   Pulse 91   Temp 36.8 °C (98.2 °F) (Temporal)   Resp 18   Wt 120 kg (264 lb 1.8 oz)   SpO2 98%   Pt informed of wait times. Educated on triage process.  Asked to return to triage RN for any new or worsening of symptoms. Thanked for patience.      "

## 2025-03-15 ENCOUNTER — OFFICE VISIT (OUTPATIENT)
Dept: URGENT CARE | Facility: CLINIC | Age: 35
End: 2025-03-15
Payer: MEDICARE

## 2025-03-15 VITALS
OXYGEN SATURATION: 98 % | TEMPERATURE: 98.2 F | SYSTOLIC BLOOD PRESSURE: 118 MMHG | WEIGHT: 264 LBS | DIASTOLIC BLOOD PRESSURE: 78 MMHG | HEIGHT: 64 IN | RESPIRATION RATE: 20 BRPM | HEART RATE: 68 BPM | BODY MASS INDEX: 45.07 KG/M2

## 2025-03-15 DIAGNOSIS — L02.211 ABSCESS OF SKIN OF ABDOMEN: ICD-10-CM

## 2025-03-15 PROCEDURE — 3078F DIAST BP <80 MM HG: CPT | Performed by: NURSE PRACTITIONER

## 2025-03-15 PROCEDURE — 3074F SYST BP LT 130 MM HG: CPT | Performed by: NURSE PRACTITIONER

## 2025-03-15 PROCEDURE — 99214 OFFICE O/P EST MOD 30 MIN: CPT | Performed by: NURSE PRACTITIONER

## 2025-03-15 RX ORDER — AMOXICILLIN AND CLAVULANATE POTASSIUM 250; 62.5 MG/5ML; MG/5ML
500 POWDER, FOR SUSPENSION ORAL 2 TIMES DAILY
Qty: 150 ML | Refills: 0 | Status: SHIPPED | OUTPATIENT
Start: 2025-03-15 | End: 2025-03-23

## 2025-03-15 ASSESSMENT — FIBROSIS 4 INDEX: FIB4 SCORE: 0.59

## 2025-03-16 ENCOUNTER — OFFICE VISIT (OUTPATIENT)
Dept: URGENT CARE | Facility: CLINIC | Age: 35
End: 2025-03-16
Payer: MEDICARE

## 2025-03-16 ENCOUNTER — PHARMACY VISIT (OUTPATIENT)
Dept: PHARMACY | Facility: MEDICAL CENTER | Age: 35
End: 2025-03-16
Payer: COMMERCIAL

## 2025-03-16 VITALS
OXYGEN SATURATION: 98 % | SYSTOLIC BLOOD PRESSURE: 124 MMHG | BODY MASS INDEX: 45.07 KG/M2 | HEART RATE: 65 BPM | DIASTOLIC BLOOD PRESSURE: 70 MMHG | TEMPERATURE: 97 F | RESPIRATION RATE: 20 BRPM | WEIGHT: 264 LBS | HEIGHT: 64 IN

## 2025-03-16 DIAGNOSIS — L73.9 FOLLICULITIS: ICD-10-CM

## 2025-03-16 PROCEDURE — 3078F DIAST BP <80 MM HG: CPT | Performed by: STUDENT IN AN ORGANIZED HEALTH CARE EDUCATION/TRAINING PROGRAM

## 2025-03-16 PROCEDURE — 3074F SYST BP LT 130 MM HG: CPT | Performed by: STUDENT IN AN ORGANIZED HEALTH CARE EDUCATION/TRAINING PROGRAM

## 2025-03-16 PROCEDURE — 99213 OFFICE O/P EST LOW 20 MIN: CPT | Performed by: STUDENT IN AN ORGANIZED HEALTH CARE EDUCATION/TRAINING PROGRAM

## 2025-03-16 ASSESSMENT — FIBROSIS 4 INDEX: FIB4 SCORE: 0.59

## 2025-03-16 ASSESSMENT — ENCOUNTER SYMPTOMS
CHILLS: 0
FEVER: 0
COUGH: 0

## 2025-03-16 NOTE — PROGRESS NOTES
Subjective:   CHIEF COMPLAINT  Chief Complaint   Patient presents with    Pain     Patient is here today for pain on her side. Patient was seen yesterday and states the pain got worse today states pain is about a 10.        HPI    History of Present Illness  The patient presents for evaluation of painful infection on her side.  She was seen in urgent care yesterday for a skin infection, started on Augmentin but has not yet filled the prescription.  Reports the pain is worsened and noticed a small head on the skin therefore came to  for further evaluation.  She is currently on aspirin therapy for a cardiac condition.  Reports she has topical antibiotics at home.  Lesion has not enlarged since being seen yesterday.  Afebrile.        ALLERGIES  The patient is allergic to multiple unspecified substances.    MEDICATIONS  Aspirin        REVIEW OF SYSTEMS  General: no fever or chills  GI: no nausea or vomiting  See HPI for further details.    PAST MEDICAL HISTORY  Patient Active Problem List    Diagnosis Date Noted    Chest pain 02/16/2025    Acute chest pain 02/15/2025    Symptom involving bladder 12/05/2024    Abdominal pain 10/03/2024    Morbid obesity with BMI of 40.0-44.9, adult ([facility]) 09/23/2024    Kidney stone 03/29/2024    Paroxysmal atrial fibrillation ([facility]) 11/14/2023    SVT (supraventricular tachycardia) ([facility]) 11/14/2023    PCOS (polycystic ovarian syndrome) 11/14/2023    Inguinal hernia 11/14/2023    Myopia of both eyes 08/29/2023    Cervical radiculopathy 08/17/2023    POTS (postural orthostatic tachycardia syndrome) 01/28/2023    Moderate persistent asthma without complication 01/27/2023    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) 08/15/2020    Chronic restrictive lung disease 04/20/2020    Schizoaffective disorder, depressive type ([facility]) 03/02/2020    IIH (idiopathic intracranial hypertension) 02/27/2020    Mixed stress and urge urinary incontinence 12/27/2019    Immunocompromised ([facility]) 11/06/2019    Moderate persistent asthma with acute exacerbation 08/14/2019    Optic neuritis 05/27/2019    Transaminitis 08/08/2018    TONYA (obstructive sleep apnea) 01/09/2018    Vitamin D deficiency 05/21/2016    Severe obesity (BMI >= 40) ([facility]) 03/07/2016    Scoliosis 03/07/2016    GERD (gastroesophageal reflux disease) 03/07/2016    OAB (overactive bladder) 02/08/2016    Fatty liver disease, nonalcoholic 01/19/2015    Dissociative identity disorder ([facility]) 04/02/2011       SURGICAL HISTORY   has a past surgical history that includes neuro dest facet l/s w/ig sngl (04/21/2015); recovery (01/27/2016); [place] by laparoscopy (08/29/2010); lumbar fusion anterior (08/21/2012); other cardiac surgery (01/2016); tonsillectomy; bowel stimulator insertion (07/13/2016); gastroscopy with balloon dilatation (N/A, 01/04/2017); muscle biopsy (Right, 01/26/2017); other abdominal surgery; laminotomy; bowel stimulator insertion (03/10/2021); lap,diagnostic abdomen (02/14/2022); ovarian cystectomy (Right, 02/14/2022); percut fix prox/neck femur fx (Left, 01/28/2023); inguinal hernia laparoscopic (Right, 07/21/2023); hernia repair (Right, 07/21/2023); cystoscopy,insert ureteral stent (Right, 2/12/2024); cysto/uretero/pyeloscopy, dx (Right, 2/12/2024); and lasertripsy (Right, 2/12/2024).    ALLERGIES  Allergies   Allergen Reactions    Cefdinir Shortness of Breath and Itching     Tolerated 1/18/17  Tolerates ceftriaxone  Tolerated augmentin 8/2019     Depakote  "[Divalproex Sodium] Unspecified     Muscle spasms/muscle pain and weakness      Doxycycline Anaphylaxis and Vomiting     Other reaction(s): pustules/blisters  Other reaction(s): stomach pain    Montelukast [Singulair] Unspecified     Cardiac effusion    Vancomycin Itching     Pt becomes flushed in face and chest.   RXN=7/10/16    Wound Dressing Adhesive Rash     By pt report-\"removes skin totally off\"    Amitriptyline Unspecified     Headaches      Amoxicillin Rash      Tolerates augmentin    Aripiprazole [Abilify] Unspecified     Headaches/muscle twitching      Clindamycin Nausea         Other reaction(s): nausea stomach pain    Erythromycin Rash     .  Other reaction(s): nausea stomach pain    Flomax [Tamsulosin Hydrochloride] Swelling    Hydromorphone      Other reaction(s): vomiting    Levaquin Unspecified     Severe muscle cramps in legs  RXN=unknown  Other reaction(s): leg muscle cramps    Metformin Unspecified     Increased lactic acid     Other reaction(s): itching and rash/nausea vomiting    Sulfa Drugs Hives, Itching, Myalgia and Unspecified     Muscle pain and weakness    Other reaction(s): unknown    Tamsulosin Swelling     Swelling of legs    Tape Rash     Tears skin off  coban with Tegaderm tape ok intermittently  RXN=ongoing    Sulfamethoxazole W-Trimethoprim Rash    Ciprofloxacin Rash          Keflex Rash     Pt states she gets a rash with this medication  Tolerates ceftriaxone  Can take with Benadryl    Levofloxacin Unspecified     Leg muscle cramps    Metronidazole Rash     \"Vision problems\"  Other reaction(s): vision problems       CURRENT MEDICATIONS  Home Medications       Reviewed by Ayan Adams Ass't (Medical Assistant) on 03/16/25 at 1143  Med List Status: <None>     Medication Last Dose Status   acetic acid (VOSOL) 2 % otic solution  Active   albuterol (PROVENTIL) 2.5mg/3ml Nebu Soln solution for nebulization  Active   amLODIPine (NORVASC) 5 MG Tab Taking Active "   amoxicillin-clavulanate (AUGMENTIN) 250-62.5 MG/5ML Recon Susp suspension  Active   aspirin 81 MG EC tablet Taking Active   celecoxib (CELEBREX) 200 MG Cap  Active   clotrimazole-betamethasone (LOTRISONE) 1-0.05 % Cream Taking Active   diphenhydrAMINE (BENADRYL) 25 MG Tab  Active   docusate sodium (COLACE) 250 MG capsule  Active   gabapentin (NEURONTIN) 400 MG Cap Not Taking Active   Galcanezumab-gnlm (EMGALITY) 120 MG/ML Solution Auto-injector Taking Active   isosorbide mononitrate SR (IMDUR) 30 MG TABLET SR 24 HR Taking Active   ivabradine (CORLANOR) 7.5 MG Tab tablet Taking Active   lamoTRIgine (LAMICTAL) 25 MG Tab Taking Active   Melatonin 10 MG Tab PRN Active   metoprolol SR (TOPROL XL) 50 MG TABLET SR 24 HR Taking Active   nitroglycerin (NITROSTAT) 0.4 MG SL Tab PRN Active   omeprazole (PRILOSEC) 20 MG delayed-release capsule Taking Active   Ranolazine 1000 MG TABLET SR 12 HR Taking Active   Rimegepant Sulfate (NURTEC) 75 MG TABLET DISPERSIBLE Taking Active   scopolamine (TRANSDERM SCOP, 1.5 MG,) 1 mg/72hr PATCH 72 HR Taking Active   spironolactone (ALDACTONE) 50 MG Tab Taking Active   sumatriptan (IMITREX) 20 MG/ACT nasal spray Taking Active   tizanidine (ZANAFLEX) 4 MG Tab Not Taking Active   topiramate (TOPAMAX) 50 MG tablet Taking Active   ziprasidone (GEODON) 40 MG Cap Taking Active                    SOCIAL HISTORY  Social History     Tobacco Use    Smoking status: Never    Smokeless tobacco: Never   Vaping Use    Vaping status: Never Used   Substance and Sexual Activity    Alcohol use: No     Alcohol/week: 0.0 oz    Drug use: Never    Sexual activity: Not Currently     Birth control/protection: Implant       FAMILY HISTORY  Family History   Problem Relation Age of Onset    Hypertension Mother     Sleep Apnea Mother     Heart Disease Mother     Other Mother         hypothryod    No Known Problems Sister     Other Sister         Narcolepsy;fibromyalsia;bone;nerve    Genitourinary () Problems Sister   "       endometriosis    Hypertension Maternal Uncle     Heart Disease Maternal Grandmother     Hypertension Maternal Grandmother     Cancer Neg Hx           Objective:   PHYSICAL EXAM  VITAL SIGNS: /70   Pulse 65   Temp 36.1 °C (97 °F) (Temporal)   Resp 20   Ht 1.626 m (5' 4\")   Wt 120 kg (264 lb)   SpO2 98%   BMI 45.32 kg/m²     Gen: no acute distress, normal voice  Skin: dry, intact, moist mucosal membranes  Head: Atraumatic, normocephalic  Psych: normal affect, normal judgement, alert, awake  Musculoskeletal: Right upper quadrant/lateral flank with 1 cm x 1 cm inflamed hair follicle.  Lesion was nonfluctuant.  No extending erythema or edema into the subcutaneous tissue.  No induration.      Assessment/Plan:     1. Folliculitis        Nondrainable lesion.  No evidence of secondary subcutaneous infection.  I did recommend topical Neosporin bid.  If no improvement within the next 2 to 3 days recommended beginning Augmentin.  Tylenol 1000 mg every 8 hours as needed for pain. Return to urgent care any new/worsening symptoms or further questions or concerns.  Patient understood everything discussed.  All questions were answered.          Please note that this dictation was created using voice recognition software. I have made a reasonable attempt to correct obvious errors, but I expect that there are errors of grammar and possibly content that I did not discover before finalizing the note.         "

## 2025-03-16 NOTE — PROGRESS NOTES
Subjective:   Krsitin Balderrama is a 34 y.o. female who presents for Hip Pain (Infection x 2 days)      Rash  This is a new problem. Episode onset: 2days. The problem has been gradually worsening since onset. Location: right side of abdomen. The rash is characterized by redness. She was exposed to nothing. Pertinent negatives include no cough or fever. The treatment provided no relief.       Review of Systems   Constitutional:  Negative for chills and fever.   Respiratory:  Negative for cough.    Skin:  Positive for rash.       Medications:    acetic acid  albuterol Nebu  amLODIPine Tabs  amoxicillin-clavulanate Susr  aspirin  celecoxib Caps  clotrimazole-betamethasone Crea  diphenhydrAMINE Tabs  docusate sodium  Emgality Soaj  gabapentin Caps  isosorbide mononitrate SR Tb24  ivabradine Tabs  lamoTRIgine Tabs  Melatonin Tabs  metoprolol SR Tb24  nitroglycerin Subl  Nurtec Tbdp  omeprazole  Ranolazine Tb12  scopolamine Pt72  spironolactone Tabs  sumatriptan  tizanidine Tabs  topiramate  ziprasidone Caps    Allergies: Cefdinir, Depakote [divalproex sodium], Doxycycline, Montelukast [singulair], Vancomycin, Wound dressing adhesive, Amitriptyline, Amoxicillin, Aripiprazole [abilify], Clindamycin, Erythromycin, Flomax [tamsulosin hydrochloride], Hydromorphone, Levaquin, Metformin, Sulfa drugs, Tamsulosin, Tape, Sulfamethoxazole w-trimethoprim, Ciprofloxacin, Keflex, Levofloxacin, and Metronidazole    Problem List: Kristin Balderrama does not have any pertinent problems on file.    Surgical History:  Past Surgical History:   Procedure Laterality Date    MD CYSTOSCOPY,INSERT URETERAL STENT Right 2/12/2024    Procedure: CYSTOSCOPY, WITH RIGHT URETEROSCOPY, WITH LITHOTRIPSY, WITH INSERTION OF RIGHT URETERAL STENT;  Surgeon: Josafat Roberson M.D.;  Location: SURGERY [facility];  Service: Urology    MD CYSTO/URETERO/PYELOSCOPY, DX Right 2/12/2024    Procedure: URETEROSCOPY;  Surgeon: Josafat Roberson M.D.;  Location:  [facility];  Service: Urology    LASERTRIPSY Right 2/12/2024    Procedure: LITHOTRIPSY, USING LASER;  Surgeon: Josafat Roberson M.D.;  Location: [facility];  Service: Urology    INGUINAL HERNIA LAPAROSCOPIC Right 07/21/2023    Procedure: LAPAROSCOPIC RIGHT INGUINAL HERNIA REPAIR WITH MESH;  Surgeon: Joe Noyola M.D.;  Location: [facility];  Service: General    HERNIA REPAIR Right 07/21/2023    PB PERCUT FIX PBOX/NECK FEMUR FX Left 01/28/2023    Procedure: FIXATION, HIP, USING CANNULATED SCREW;  Surgeon: Noman Abdul M.D.;  Location: [facility];  Service: Orthopedics    UT LAP,DIAGNOSTIC ABDOMEN  02/14/2022    Procedure: LAPAROSCOPY;  Surgeon: Seamus Pisano M.D.;  Location: SURGERY SAME DAY [facility];  Service: Gynecology    OVARIAN CYSTECTOMY Right 02/14/2022    Procedure: EXCISION, CYST, OVARY;  Surgeon: Seamus Pisano M.D.;  Location: SURGERY SAME DAY [facility];  Service: Gynecology    BOWEL STIMULATOR INSERTION  03/10/2021    Procedure: INSERTION, ELECTRODE LEADS AND PULSE GENERATOR, NEUROSTIMULATOR, SACRAL - REMOVAL OF INTERSTIM WITH REPLACEMENT OF SACRAL NEUROMODULATION DEVICE;  Surgeon: Joe Noyola M.D.;  Location: [facility];  Service: General    MUSCLE BIOPSY Right 01/26/2017    Procedure: MUSCLE BIOPSY - THIGH;  Surgeon: Isidro Vigil M.D.;  Location: [facility];  Service:     GASTROSCOPY WITH BALLOON DILATATION N/A 01/04/2017    Procedure: GASTROSCOPY WITH DILATATION;  Surgeon: Torres Vargas M.D.;  Location: [facility];  Service:     BOWEL STIMULATOR INSERTION  07/13/2016    Procedure: BOWEL STIMULATOR INSERTION FOR PERMANENT INTERSTIM SACRAL IMPLANT;  Surgeon: Joe Noyola M.D.;  Location: [facility];  Service:     RECOVERY  01/27/2016    Procedure: CATH LAB EP STUDY WITH SINUS NODE MODIFICATION ABHINAV;  Surgeon: [facility];  [place] [facility];  Service:     OTHER CARDIAC  "SURGERY  01/2016    cardiac ablation    NEURO DEST FACET L/S W/IG SNGL  04/21/2015    Performed by Reza Tabor at SURGERY [facility]    LUMBAR FUSION ANTERIOR  08/21/2012    Performed by SUSIE SAWANT at SURGERY [facility]    ALYSSA BY LAPAROSCOPY  08/29/2010    Performed by SHAYY JOHNS at SURGERY [facility]    LAMINOTOMY      OTHER ABDOMINAL SURGERY      TONSILLECTOMY      tonsillectomy       Past Social Hx: Kristin Balderrama  reports that she has never smoked. She has never used smokeless tobacco. She reports that she does not drink alcohol and does not use drugs.     Past Family Hx:  Kristin Balderrama family history includes Genitourinary () Problems in her sister; Heart Disease in her maternal grandmother and mother; Hypertension in her maternal grandmother, maternal uncle, and mother; No Known Problems in her sister; Other in her mother and sister; Sleep Apnea in her mother.     Problem list, medications, and allergies reviewed by myself today in Epic.     Objective:     /78   Pulse 68   Temp 36.8 °C (98.2 °F) (Temporal)   Resp 20   Ht 1.626 m (5' 4\")   Wt 120 kg (264 lb)   SpO2 98%   BMI 45.32 kg/m²     Physical Exam  Constitutional:       Appearance: Normal appearance. She is not ill-appearing or toxic-appearing.   HENT:      Head: Normocephalic.      Right Ear: External ear normal.      Left Ear: External ear normal.      Nose: Nose normal.      Mouth/Throat:      Lips: Pink.   Eyes:      General: Lids are normal.   Pulmonary:      Effort: Pulmonary effort is normal. No accessory muscle usage.   Musculoskeletal:      Cervical back: Full passive range of motion without pain.   Skin:     Findings: Abscess present.             Comments: 2mm erythematous area no fluctuation mildly not indurated    Neurological:      Mental Status: She is alert and oriented to person, place, and time.   Psychiatric:         Mood and Affect: Mood normal.         Thought Content: " Thought content normal.         Assessment/Plan:     Diagnosis and associated orders:     1. Abscess of skin of abdomen  amoxicillin-clavulanate (AUGMENTIN) 250-62.5 MG/5ML Recon Susp suspension         Comments/MDM:     I personally reviewed prior external notes and prior test results pertinent to today's visit.  I&D does not appear to be indicated discussed watchful monitoring.  Patient having multiple drug allergies to antibiotics we will start patient on Augmentin as she is tolerated this in the past.  Discussed management options, risks and benefits, and alternatives to treatment plan agreed upon.   Red flags discussed and indications to immediately call 911 or present to the Emergency Department.   Supportive care, differential diagnoses, and indications for immediate follow-up discussed with patient.    Patient expresses understanding and agrees to plan. Patient denies any other questions or concerns.                Please note that this dictation was created using voice recognition software. I have made a reasonable attempt to correct obvious errors, but I expect that there are errors of grammar and possibly content that I did not discover before finalizing the note.    This note was electronically signed by Felice PUGA.     no

## 2025-03-17 ENCOUNTER — OFFICE VISIT (OUTPATIENT)
Dept: MEDICAL GROUP | Facility: MEDICAL CENTER | Age: 35
End: 2025-03-17
Payer: MEDICARE

## 2025-03-17 ENCOUNTER — HOSPITAL ENCOUNTER (OUTPATIENT)
Facility: MEDICAL CENTER | Age: 35
End: 2025-03-17
Attending: NURSE PRACTITIONER
Payer: MEDICARE

## 2025-03-17 ENCOUNTER — HOSPITAL ENCOUNTER (OUTPATIENT)
Facility: MEDICAL CENTER | Age: 35
End: 2025-03-17
Attending: PHYSICIAN ASSISTANT
Payer: MEDICARE

## 2025-03-17 VITALS
DIASTOLIC BLOOD PRESSURE: 74 MMHG | BODY MASS INDEX: 44.39 KG/M2 | HEIGHT: 64 IN | WEIGHT: 260 LBS | RESPIRATION RATE: 14 BRPM | HEART RATE: 60 BPM | SYSTOLIC BLOOD PRESSURE: 120 MMHG | TEMPERATURE: 97.6 F | OXYGEN SATURATION: 100 %

## 2025-03-17 DIAGNOSIS — R10.9 FLANK PAIN: ICD-10-CM

## 2025-03-17 DIAGNOSIS — Z87.442 HISTORY OF RENAL STONE: ICD-10-CM

## 2025-03-17 DIAGNOSIS — Z88.9 MULTIPLE DRUG ALLERGIES: ICD-10-CM

## 2025-03-17 DIAGNOSIS — N20.0 RENAL STONE: ICD-10-CM

## 2025-03-17 LAB
APPEARANCE UR: NORMAL
BILIRUB UR STRIP-MCNC: NORMAL MG/DL
COLOR UR AUTO: NORMAL
GLUCOSE UR STRIP.AUTO-MCNC: NORMAL MG/DL
KETONES UR STRIP.AUTO-MCNC: NORMAL MG/DL
LEUKOCYTE ESTERASE UR QL STRIP.AUTO: NORMAL
NITRITE UR QL STRIP.AUTO: NORMAL
PH UR STRIP.AUTO: 7 [PH] (ref 5–8)
PROT UR QL STRIP: NORMAL MG/DL
RBC UR QL AUTO: NORMAL
SP GR UR STRIP.AUTO: 1.02
UROBILINOGEN UR STRIP-MCNC: 1 MG/DL

## 2025-03-17 PROCEDURE — 3074F SYST BP LT 130 MM HG: CPT | Performed by: NURSE PRACTITIONER

## 2025-03-17 PROCEDURE — 99213 OFFICE O/P EST LOW 20 MIN: CPT | Performed by: NURSE PRACTITIONER

## 2025-03-17 PROCEDURE — 81002 URINALYSIS NONAUTO W/O SCOPE: CPT | Performed by: NURSE PRACTITIONER

## 2025-03-17 PROCEDURE — 3078F DIAST BP <80 MM HG: CPT | Performed by: NURSE PRACTITIONER

## 2025-03-17 PROCEDURE — 87086 URINE CULTURE/COLONY COUNT: CPT

## 2025-03-17 ASSESSMENT — FIBROSIS 4 INDEX: FIB4 SCORE: 0.59

## 2025-03-17 NOTE — PROGRESS NOTES
"Subjective:     Kristin Balderrama is a 34 y.o. female presents to discuss:     Chief Complaint   Patient presents with    Hip Pain     Rt hip issue; tylenol isnt helping      Verbal consent was acquired by the patient to use [employer] ambient listening note generation during this visit Yes   History of Present Illness  The patient presents for evaluation of flank pain    She reports a worsening back pain that she describes as radiating and akin to the sensation of performing a \"thousand crunches\" simultaneously. The onset of this pain was on Saturday, with no history of heavy lifting or twisting movements. She has not experienced any recent falls. Her grandmother has observed unilateral back swelling. She has been maintaining hydration and has not had any fevers. She has a history of kidney stones but notes that the current pain is different from her previous experiences. She reports normal urination and adequate hydration. She is under the care of a urologist for her kidney stones, with her last consultation in November 2024. She has previously undergone a procedure for stone removal. She has a history of suprapubic catheterization.    Supplemental Information  She had a recent ER visit for chest pain and shortness of breath, during which a chest x-ray was performed.    ALLERGIES  The patient is allergic to FLOMAX.    Noted on CT Abdomen 10/2024    \"The right kidney shows a small nonobstructing calcified stone. No hydronephrosis. The bladder is partially full. \"    ROS: : see above      Current Outpatient Medications:     amoxicillin-clavulanate (AUGMENTIN) 250-62.5 MG/5ML Recon Susp suspension, Take 10 mL by mouth 2 times a day for 7 days. DISCARD REMAINDER., Disp: 150 mL, Rfl: 0    clotrimazole-betamethasone (LOTRISONE) 1-0.05 % Cream, apply twice a day to the affected area for 10 days, Disp: 15 g, Rfl: 1    celecoxib (CELEBREX) 200 MG Cap, Take 1 Capsule by mouth 2 times a day., Disp: 60 Capsule, Rfl: 0    " Ranolazine 1000 MG TABLET SR 12 HR, Take 1 Tablet by mouth 2 times a day., Disp: 180 Tablet, Rfl: 3    isosorbide mononitrate SR (IMDUR) 30 MG TABLET SR 24 HR, Take 1 Tablet by mouth every morning., Disp: 90 Tablet, Rfl: 3    metoprolol SR (TOPROL XL) 50 MG TABLET SR 24 HR, Take 1 Tablet by mouth 2 times a day., Disp: 180 Tablet, Rfl: 3    gabapentin (NEURONTIN) 400 MG Cap, TAKE 3 CAPSULES BY MOUTH THREE TIMES DAILY.  FOR 30 DAY SUPPLY. DX: M79.7 (Patient not taking: Reported on 3/16/2025), Disp: 270 Capsule, Rfl: 0    tizanidine (ZANAFLEX) 4 MG Tab, Take 1 Tablet Three Times A Day as needed QTY 90 for 30 days  Dx: M79.18 (Patient not taking: Reported on 3/16/2025), Disp: 90 Tablet, Rfl: 0    acetic acid (VOSOL) 2 % otic solution, Insert saturated wick of cotton; keep moist 24 hours by adding 3 to 5 drops every 4 to 6 hours. Remove wick after 24 hours and instill 5 drops 3 to 4 times daily x 7 days., Disp: 15 mL, Rfl: 0    albuterol (PROVENTIL) 2.5mg/3ml Nebu Soln solution for nebulization, Take 3 mL by nebulization every four hours as needed for Shortness of Breath., Disp: 75 mL, Rfl: 0    aspirin 81 MG EC tablet, Take 1 Tablet by mouth every day., Disp: 100 Tablet, Rfl: 3    docusate sodium (COLACE) 250 MG capsule, Take 250 mg by mouth 2 times a day., Disp: , Rfl:     nitroglycerin (NITROSTAT) 0.4 MG SL Tab, Place 1 Tablet under the tongue as needed for Chest Pain (may repeat for chest pain every 5 mins not to exceed 3 doses)., Disp: 25 Tablet, Rfl: 11    spironolactone (ALDACTONE) 50 MG Tab, Take 1 Tablet by mouth every day., Disp: 90 Tablet, Rfl: 3    omeprazole (PRILOSEC) 20 MG delayed-release capsule, Take 2 Capsules by mouth 2 times a day., Disp: 180 Capsule, Rfl: 3    topiramate (TOPAMAX) 50 MG tablet, Take 1 Tablet by mouth 2 times a day., Disp: 180 Tablet, Rfl: 4    scopolamine (TRANSDERM SCOP, 1.5 MG,) 1 mg/72hr PATCH 72 HR, Place 1 Patch on the skin every 72 hours., Disp: 7 Patch, Rfl: 1     "ziprasidone (GEODON) 40 MG Cap, Take 1 capsule by mouth at bedtime., Disp: 90 Capsule, Rfl: 1    amLODIPine (NORVASC) 5 MG Tab, Take 1 tablet by mouth every day., Disp: 90 Tablet, Rfl: 1    Galcanezumab-gnlm (EMGALITY) 120 MG/ML Solution Auto-injector, Inject 1 mL subcutaneously every month., Disp: 1 mL, Rfl: 11    lamoTRIgine (LAMICTAL) 25 MG Tab, Take 2 Tablets by mouth 2 times a day., Disp: 360 Tablet, Rfl: 1    Rimegepant Sulfate (NURTEC) 75 MG TABLET DISPERSIBLE, Take 1 tablet by mouth at onset of migraine or aura okay to repeat in 24 hours if needed., Disp: 8 Tablet, Rfl: 5    sumatriptan (IMITREX) 20 MG/ACT nasal spray, Give 1 dose at onset headache okay to repeat in 2 hours if needed for max of 2 doses in a 24 hour timeframe., Disp: 6 Each, Rfl: 5    ivabradine (CORLANOR) 7.5 MG Tab tablet, Take 1 Tablet by mouth 2 times a day with meals., Disp: 60 Tablet, Rfl: 3    diphenhydrAMINE (BENADRYL) 25 MG Tab, Take 50 mg by mouth at bedtime., Disp: , Rfl:     Melatonin 10 MG Tab, Take 10 mg by mouth at bedtime., Disp: , Rfl:     Allergies   Allergen Reactions    Cefdinir Shortness of Breath and Itching     Tolerated 1/18/17  Tolerates ceftriaxone  Tolerated augmentin 8/2019     Depakote [Divalproex Sodium] Unspecified     Muscle spasms/muscle pain and weakness      Doxycycline Anaphylaxis and Vomiting     Other reaction(s): pustules/blisters  Other reaction(s): stomach pain    Montelukast [Singulair] Unspecified     Cardiac effusion    Vancomycin Itching     Pt becomes flushed in face and chest.   RXN=7/10/16    Wound Dressing Adhesive Rash     By pt report-\"removes skin totally off\"    Amitriptyline Unspecified     Headaches      Amoxicillin Rash      Tolerates augmentin    Aripiprazole [Abilify] Unspecified     Headaches/muscle twitching      Clindamycin Nausea         Other reaction(s): nausea stomach pain    Erythromycin Rash     .  Other reaction(s): nausea stomach pain    Flomax [Tamsulosin Hydrochloride] " "Swelling    Hydromorphone      Other reaction(s): vomiting    Levaquin Unspecified     Severe muscle cramps in legs  RXN=unknown  Other reaction(s): leg muscle cramps    Metformin Unspecified     Increased lactic acid     Other reaction(s): itching and rash/nausea vomiting    Sulfa Drugs Hives, Itching, Myalgia and Unspecified     Muscle pain and weakness    Other reaction(s): unknown    Tamsulosin Swelling     Swelling of legs    Tape Rash     Tears skin off  coban with Tegaderm tape ok intermittently  RXN=ongoing    Sulfamethoxazole W-Trimethoprim Rash    Ciprofloxacin Rash          Keflex Rash     Pt states she gets a rash with this medication  Tolerates ceftriaxone  Can take with Benadryl    Levofloxacin Unspecified     Leg muscle cramps    Metronidazole Rash     \"Vision problems\"  Other reaction(s): vision problems       Objective:   Vitals: /74   Pulse 60   Temp 36.4 °C (97.6 °F) (Temporal)   Resp 14   Ht 1.626 m (5' 4\")   Wt 118 kg (260 lb)   SpO2 100%   BMI 44.63 kg/m²    General: Alert, pleasant, NAD  HEENT: Normocephalic.  Neck supple.   Respiratory: no distress, no audible wheezing, RR -WNL  Skin: Warm, dry, no rashes.  Extremities: No leg edema. No discoloration  Neurological: No tremors  Psych:  Affect/mood is normal, judgement is good, memory is intact, grooming is appropriate.      Point-of-care urinalysis with negative blood, negative nitrates, negative leukocytes.    Assessment/Plan:      1. Flank pain  - POCT Urinalysis  - URINE CULTURE(NEW); Future    2. History of renal stone    3. Renal stone    4. Multiple drug allergies     Assessment & Plan  1. Back pain.  The etiology of the pain could be either musculoskeletal or due to a potential early-stage kidney stone. Recent imaging studies, including an ultrasound on 02/07/2025 and a CT scan in October 2024, did not reveal any significant abnormalities except for a tiny nonobstructing calcified stone on the right kidney. Given her " history of kidney stones, it is plausible that she may have renal stone that has migrated or enlarged. She has been advised to maintain adequate hydration and monitor for symptoms such as fever or urinary retention, which would necessitate immediate emergency room visit. A urine test will be conducted today to rule out any signs of infection.  Considered a prescription for alpha-blocker however patient does have multiple drug allergies and history of SVT and POTS therefore have decided to hold on starting on this medication due to potential side effects.  Push fluids.  ER precautions discussed.  She has also been advised to schedule an appointment with her urologist.      Return if symptoms worsen or fail to improve.    {I have placed the above orders and discussed them with an approved delegating provider. The MA is performing the below orders under the direction of Dr. Ernie CARO    Health maintenance:    General Recommendations:   Smoking: recommend complete avoidance of all tobacco products  Alcohol: recommend limiting consumption  Physical Activity: goal is 30 min of moderate activity 5 times a week  Weight Management and Nutrition: high vegetable, high protein diet like the Mediterranean diet, portion control, avoid excessive sugars, Low Glycemic Index foods, adequate hydration, sleep.

## 2025-03-19 ENCOUNTER — HOSPITAL ENCOUNTER (OUTPATIENT)
Dept: RADIOLOGY | Facility: MEDICAL CENTER | Age: 35
End: 2025-03-19
Attending: PHYSICIAN ASSISTANT
Payer: MEDICARE

## 2025-03-19 ENCOUNTER — RESULTS FOLLOW-UP (OUTPATIENT)
Dept: MEDICAL GROUP | Facility: MEDICAL CENTER | Age: 35
End: 2025-03-19

## 2025-03-19 DIAGNOSIS — N20.0 CALCULUS OF KIDNEY: ICD-10-CM

## 2025-03-19 LAB
BACTERIA UR CULT: NORMAL
SIGNIFICANT IND 70042: NORMAL
SITE SITE: NORMAL
SOURCE SOURCE: NORMAL

## 2025-03-19 PROCEDURE — 74176 CT ABD & PELVIS W/O CONTRAST: CPT

## 2025-03-23 ENCOUNTER — OFFICE VISIT (OUTPATIENT)
Dept: URGENT CARE | Facility: CLINIC | Age: 35
End: 2025-03-23
Payer: MEDICARE

## 2025-03-23 ENCOUNTER — PHARMACY VISIT (OUTPATIENT)
Dept: PHARMACY | Facility: MEDICAL CENTER | Age: 35
End: 2025-03-23
Payer: COMMERCIAL

## 2025-03-23 ENCOUNTER — PATIENT MESSAGE (OUTPATIENT)
Dept: CARDIOLOGY | Facility: MEDICAL CENTER | Age: 35
End: 2025-03-23

## 2025-03-23 ENCOUNTER — APPOINTMENT (OUTPATIENT)
Dept: RADIOLOGY | Facility: IMAGING CENTER | Age: 35
End: 2025-03-23
Attending: NURSE PRACTITIONER
Payer: MEDICARE

## 2025-03-23 VITALS
OXYGEN SATURATION: 99 % | WEIGHT: 263 LBS | HEART RATE: 72 BPM | TEMPERATURE: 97.4 F | HEIGHT: 64 IN | RESPIRATION RATE: 14 BRPM | DIASTOLIC BLOOD PRESSURE: 78 MMHG | BODY MASS INDEX: 44.9 KG/M2 | SYSTOLIC BLOOD PRESSURE: 122 MMHG

## 2025-03-23 DIAGNOSIS — J02.9 PHARYNGITIS, UNSPECIFIED ETIOLOGY: ICD-10-CM

## 2025-03-23 DIAGNOSIS — U07.1 COVID: ICD-10-CM

## 2025-03-23 DIAGNOSIS — R05.1 ACUTE COUGH: ICD-10-CM

## 2025-03-23 LAB
FLUAV RNA SPEC QL NAA+PROBE: NEGATIVE
FLUBV RNA SPEC QL NAA+PROBE: NEGATIVE
RSV RNA SPEC QL NAA+PROBE: NEGATIVE
S PYO DNA SPEC NAA+PROBE: NOT DETECTED
SARS-COV-2 RNA RESP QL NAA+PROBE: POSITIVE

## 2025-03-23 PROCEDURE — 3074F SYST BP LT 130 MM HG: CPT | Performed by: NURSE PRACTITIONER

## 2025-03-23 PROCEDURE — 3078F DIAST BP <80 MM HG: CPT | Performed by: NURSE PRACTITIONER

## 2025-03-23 PROCEDURE — 99214 OFFICE O/P EST MOD 30 MIN: CPT | Performed by: NURSE PRACTITIONER

## 2025-03-23 PROCEDURE — 71046 X-RAY EXAM CHEST 2 VIEWS: CPT | Mod: TC | Performed by: RADIOLOGY

## 2025-03-23 PROCEDURE — 0241U POCT CEPHEID COV-2, FLU A/B, RSV - PCR: CPT | Performed by: NURSE PRACTITIONER

## 2025-03-23 PROCEDURE — RXMED WILLOW AMBULATORY MEDICATION CHARGE: Performed by: NURSE PRACTITIONER

## 2025-03-23 PROCEDURE — 87651 STREP A DNA AMP PROBE: CPT | Performed by: NURSE PRACTITIONER

## 2025-03-23 RX ORDER — PREDNISONE 10 MG/1
40 TABLET ORAL DAILY
Qty: 20 TABLET | Refills: 0 | Status: SHIPPED | OUTPATIENT
Start: 2025-03-23 | End: 2025-03-28

## 2025-03-23 RX ORDER — BENZONATATE 100 MG/1
100 CAPSULE ORAL 3 TIMES DAILY PRN
Qty: 60 CAPSULE | Refills: 0 | Status: SHIPPED | OUTPATIENT
Start: 2025-03-23

## 2025-03-23 ASSESSMENT — ENCOUNTER SYMPTOMS
RHINORRHEA: 1
CHILLS: 1
FEVER: 0
COUGH: 1
WHEEZING: 1
SORE THROAT: 0
SHORTNESS OF BREATH: 1
HEADACHES: 0

## 2025-03-23 ASSESSMENT — FIBROSIS 4 INDEX: FIB4 SCORE: 0.59

## 2025-03-23 NOTE — PROGRESS NOTES
Subjective:   Kristin Balderrama is a 34 y.o. female who presents for Cough (Patient is here today for coughing and SOB. Patient states everytime she coughs she has a foul taste )      URI   The current episode started yesterday. The problem has been unchanged. Associated symptoms include congestion, coughing, rhinorrhea and wheezing. Pertinent negatives include no chest pain, ear pain, headaches, plugged ear sensation, rash or sore throat. She has tried acetaminophen and inhaler use for the symptoms.       Review of Systems   Constitutional:  Positive for chills and malaise/fatigue. Negative for fever.   HENT:  Positive for congestion and rhinorrhea. Negative for ear pain and sore throat.    Respiratory:  Positive for cough, shortness of breath and wheezing.    Cardiovascular:  Negative for chest pain.   Skin:  Negative for rash.   Neurological:  Negative for headaches.       Medications:    acetic acid  albuterol Nebu  amLODIPine Tabs  amoxicillin-clavulanate Susr  aspirin  celecoxib Caps  clotrimazole-betamethasone Crea  dicyclomine Caps  diphenhydrAMINE Tabs  docusate sodium  Emgality Soaj  gabapentin Caps  isosorbide mononitrate SR Tb24  ivabradine Tabs  lamoTRIgine Tabs  Melatonin Tabs  metoprolol SR Tb24  nitroglycerin Subl  Nurtec Tbdp  omeprazole  Ranolazine Tb12  scopolamine Pt72  spironolactone Tabs  sumatriptan  tizanidine Tabs  topiramate  ziprasidone Caps    Allergies: Cefdinir, Depakote [divalproex sodium], Doxycycline, Montelukast [singulair], Vancomycin, Wound dressing adhesive, Amitriptyline, Amoxicillin, Aripiprazole [abilify], Clindamycin, Erythromycin, Flomax [tamsulosin hydrochloride], Hydromorphone, Levaquin, Metformin, Sulfa drugs, Tamsulosin, Tape, Sulfamethoxazole w-trimethoprim, Ciprofloxacin, Keflex, Levofloxacin, and Metronidazole    Problem List: Kristin Balderrama does not have any pertinent problems on file.    Surgical History:  Past Surgical History:   Procedure Laterality Date     LA CYSTOSCOPY,INSERT URETERAL STENT Right 2/12/2024    Procedure: CYSTOSCOPY, WITH RIGHT URETEROSCOPY, WITH LITHOTRIPSY, WITH INSERTION OF RIGHT URETERAL STENT;  Surgeon: Josafat Roberson M.D.;  Location: [facility];  Service: Urology    LA CYSTO/URETERO/PYELOSCOPY, DX Right 2/12/2024    Procedure: URETEROSCOPY;  Surgeon: Josafat Roberson M.D.;  Location: [facility];  Service: Urology    LASERTRIPSY Right 2/12/2024    Procedure: LITHOTRIPSY, USING LASER;  Surgeon: Josafat Roberson M.D.;  Location: [facility];  Service: Urology    INGUINAL HERNIA LAPAROSCOPIC Right 07/21/2023    Procedure: LAPAROSCOPIC RIGHT INGUINAL HERNIA REPAIR WITH MESH;  Surgeon: Joe Noyola M.D.;  Location: [facility];  Service: General    HERNIA REPAIR Right 07/21/2023    PB PERCUT FIX PBOX/NECK FEMUR FX Left 01/28/2023    Procedure: FIXATION, HIP, USING CANNULATED SCREW;  Surgeon: Noman Abdul M.D.;  Location: [facility];  Service: Orthopedics    LA LAP,DIAGNOSTIC ABDOMEN  02/14/2022    Procedure: LAPAROSCOPY;  Surgeon: Seamus Pisano M.D.;  Location: SURGERY SAME DAY [facility];  Service: Gynecology    OVARIAN CYSTECTOMY Right 02/14/2022    Procedure: EXCISION, CYST, OVARY;  Surgeon: Seamus Pisano M.D.;  Location: SURGERY SAME DAY [facility];  Service: Gynecology    BOWEL STIMULATOR INSERTION  03/10/2021    Procedure: INSERTION, ELECTRODE LEADS AND PULSE GENERATOR, NEUROSTIMULATOR, SACRAL - REMOVAL OF INTERSTIM WITH REPLACEMENT OF SACRAL NEUROMODULATION DEVICE;  Surgeon: Joe Noyola M.D.;  Location: [facility];  Service: General    MUSCLE BIOPSY Right 01/26/2017    Procedure: MUSCLE BIOPSY - THIGH;  Surgeon: Isidro Vigil M.D.;  Location: [facility];  Service:     GASTROSCOPY WITH BALLOON DILATATION N/A 01/04/2017    Procedure: GASTROSCOPY WITH DILATATION;  Surgeon: Torres Vargas M.D.;  Location: [facility];  Service:     BOWEL STIMULATOR  "INSERTION  07/13/2016    Procedure: BOWEL STIMULATOR INSERTION FOR PERMANENT INTERSTIM SACRAL IMPLANT;  Surgeon: Joe Noyola M.D.;  Location: SURGERY [facility];  Service:     RECOVERY  01/27/2016    Procedure: CATH LAB EP STUDY WITH SINUS NODE MODIFICATION ABHINAV;  Surgeon: Recoveryonly Surgery;  Location: SURGERY PRE-POST PROC UNIT [facility] [place] [place];  Service:     OTHER CARDIAC SURGERY  01/2016    cardiac ablation    NEURO DEST FACET L/S W/IG SNGL  04/21/2015    Performed by Reza Tabor at SURGERY [facility]    LUMBAR FUSION ANTERIOR  08/21/2012    Performed by SUSIE SAWANT at SURGERY [facility]S    ALYSSA BY LAPAROSCOPY  08/29/2010    Performed by SHAYY JOHNS at SURGERY [facility] ORS    LAMINOTOMY      OTHER ABDOMINAL SURGERY      TONSILLECTOMY      tonsillectomy       Past Social Hx: Kristin Balderrama  reports that she has never smoked. She has never used smokeless tobacco. She reports that she does not drink alcohol and does not use drugs.     Past Family Hx:  Kristin Balderrama family history includes Genitourinary () Problems in her sister; Heart Disease in her maternal grandmother and mother; Hypertension in her maternal grandmother, maternal uncle, and mother; No Known Problems in her sister; Other in her mother and sister; Sleep Apnea in her mother.     Problem list, medications, and allergies reviewed by myself today in Epic.     Objective:     /78   Pulse 72   Temp 36.3 °C (97.4 °F) (Temporal)   Resp 14   Ht 1.626 m (5' 4\")   Wt 119 kg (263 lb)   SpO2 99%   BMI 45.14 kg/m²     Physical Exam  Constitutional:       General: She is not in acute distress.     Appearance: She is well-developed.   HENT:      Head: Normocephalic and atraumatic.      Right Ear: Tympanic membrane normal.      Left Ear: Tympanic membrane normal.      Nose: Nose normal.      Right Sinus: No maxillary sinus tenderness or frontal sinus tenderness.      Left Sinus: No maxillary sinus tenderness or " frontal sinus tenderness.      Mouth/Throat:      Mouth: Mucous membranes are moist.      Pharynx: Oropharynx is clear. Uvula midline. No posterior oropharyngeal erythema.      Tonsils: No tonsillar exudate or tonsillar abscesses.   Eyes:      General:         Right eye: No discharge.         Left eye: No discharge.      Conjunctiva/sclera: Conjunctivae normal.   Cardiovascular:      Rate and Rhythm: Normal rate and regular rhythm.   Pulmonary:      Effort: Pulmonary effort is normal. No respiratory distress.      Breath sounds: Normal breath sounds.   Musculoskeletal:      Cervical back: No rigidity.   Lymphadenopathy:      Cervical: No cervical adenopathy.   Skin:     General: Skin is warm and dry.      Capillary Refill: Capillary refill takes less than 2 seconds.   Neurological:      Mental Status: She is alert and oriented to person, place, and time.      Sensory: No sensory deficit.      Deep Tendon Reflexes: Reflexes are normal and symmetric.   Psychiatric:         Mood and Affect: Mood normal.         Behavior: Behavior normal.         Assessment/Plan:     Diagnosis and associated orders:     1. Pharyngitis, unspecified etiology  POCT GROUP A STREP, PCR    DX-CHEST-2 VIEWS    POCT CoV-2, Flu A/B, RSV by PCR      2. Acute cough        3. COVID  predniSONE (DELTASONE) 10 MG Tab    benzonatate (TESSALON) 100 MG Cap           Comments/MDM:     ,I independently reviewed the patient's imaging and agree with the interpretation of the radiologist.    1. No active cardiopulmonary abnormalities are identified.     Results for orders placed or performed in visit on 03/23/25   POCT GROUP A STREP, PCR    Collection Time: 03/23/25  4:02 PM   Result Value Ref Range    POC Group A Strep, PCR Not Detected Not Detected, Invalid   POCT CoV-2, Flu A/B, RSV by PCR    Collection Time: 03/23/25  4:02 PM   Result Value Ref Range    SARS-CoV-2 by PCR Positive (A) Negative, Invalid    Influenza virus A RNA Negative Negative, Invalid     Influenza virus B, PCR Negative Negative, Invalid    RSV, PCR Negative Negative, Invalid     *Note: Due to a large number of results and/or encounters for the requested time period, some results have not been displayed. A complete set of results can be found in Results Review.       I personally reviewed prior external notes and prior test results pertinent to today's visit.  Had consider Paxlovid due to patient's history however contraindicated with that she is currently taking.  Patient will be treated with steroids.  Discussed management options, risks and benefits, and alternatives to treatment plan agreed upon.   Red flags discussed and indications to immediately call 911 or present to the Emergency Department.   Supportive care, differential diagnoses, and indications for immediate follow-up discussed with patient.    Patient expresses understanding and agrees to plan. Patient denies any other questions or concerns.                    Please note that this dictation was created using voice recognition software. I have made a reasonable attempt to correct obvious errors, but I expect that there are errors of grammar and possibly content that I did not discover before finalizing the note.    This note was electronically signed by Felice PUGA.

## 2025-03-23 NOTE — LETTER
March 23, 2025         Patient: Kristin Balderrama   YOB: 1990   Date of Visit: 3/23/2025           To Whom it May Concern:    Kristin Balderrama was seen in my clinic on 3/23/2025. She may return to work on 3/26/25.    If you have any questions or concerns, please don't hesitate to call.        Sincerely,           PER Song.  Electronically Signed

## 2025-03-24 ENCOUNTER — APPOINTMENT (OUTPATIENT)
Dept: RADIOLOGY | Facility: MEDICAL CENTER | Age: 35
End: 2025-03-24
Attending: EMERGENCY MEDICINE
Payer: MEDICARE

## 2025-03-24 ENCOUNTER — PATIENT MESSAGE (OUTPATIENT)
Dept: CARDIOLOGY | Facility: MEDICAL CENTER | Age: 35
End: 2025-03-24
Payer: MEDICARE

## 2025-03-24 ENCOUNTER — HOSPITAL ENCOUNTER (EMERGENCY)
Facility: MEDICAL CENTER | Age: 35
End: 2025-03-24
Attending: EMERGENCY MEDICINE
Payer: MEDICARE

## 2025-03-24 VITALS
DIASTOLIC BLOOD PRESSURE: 63 MMHG | BODY MASS INDEX: 44.9 KG/M2 | RESPIRATION RATE: 24 BRPM | SYSTOLIC BLOOD PRESSURE: 111 MMHG | OXYGEN SATURATION: 96 % | WEIGHT: 263 LBS | HEART RATE: 81 BPM | HEIGHT: 64 IN | TEMPERATURE: 98.7 F

## 2025-03-24 DIAGNOSIS — R19.7 DIARRHEA, UNSPECIFIED TYPE: ICD-10-CM

## 2025-03-24 DIAGNOSIS — R06.02 SOB (SHORTNESS OF BREATH): ICD-10-CM

## 2025-03-24 DIAGNOSIS — U07.1 COVID-19 VIRUS INFECTION: ICD-10-CM

## 2025-03-24 LAB
ALBUMIN SERPL BCP-MCNC: 4.7 G/DL (ref 3.2–4.9)
ALBUMIN/GLOB SERPL: 2 G/DL
ALP SERPL-CCNC: 77 U/L (ref 30–99)
ALT SERPL-CCNC: 34 U/L (ref 2–50)
ANION GAP SERPL CALC-SCNC: 16 MMOL/L (ref 7–16)
AST SERPL-CCNC: 25 U/L (ref 12–45)
BASOPHILS # BLD AUTO: 0.6 % (ref 0–1.8)
BASOPHILS # BLD: 0.03 K/UL (ref 0–0.12)
BILIRUB SERPL-MCNC: 0.4 MG/DL (ref 0.1–1.5)
BUN SERPL-MCNC: 14 MG/DL (ref 8–22)
CALCIUM ALBUM COR SERPL-MCNC: 9 MG/DL (ref 8.5–10.5)
CALCIUM SERPL-MCNC: 9.6 MG/DL (ref 8.5–10.5)
CHLORIDE SERPL-SCNC: 110 MMOL/L (ref 96–112)
CO2 SERPL-SCNC: 13 MMOL/L (ref 20–33)
CREAT SERPL-MCNC: 0.92 MG/DL (ref 0.5–1.4)
EKG IMPRESSION: NORMAL
EOSINOPHIL # BLD AUTO: 0.04 K/UL (ref 0–0.51)
EOSINOPHIL NFR BLD: 0.9 % (ref 0–6.9)
ERYTHROCYTE [DISTWIDTH] IN BLOOD BY AUTOMATED COUNT: 43.1 FL (ref 35.9–50)
GFR SERPLBLD CREATININE-BSD FMLA CKD-EPI: 83 ML/MIN/1.73 M 2
GLOBULIN SER CALC-MCNC: 2.3 G/DL (ref 1.9–3.5)
GLUCOSE SERPL-MCNC: 197 MG/DL (ref 65–99)
HCT VFR BLD AUTO: 42.3 % (ref 37–47)
HGB BLD-MCNC: 14.7 G/DL (ref 12–16)
IMM GRANULOCYTES # BLD AUTO: 0.02 K/UL (ref 0–0.11)
IMM GRANULOCYTES NFR BLD AUTO: 0.4 % (ref 0–0.9)
LYMPHOCYTES # BLD AUTO: 0.24 K/UL (ref 1–4.8)
LYMPHOCYTES NFR BLD: 5.1 % (ref 22–41)
MCH RBC QN AUTO: 32.2 PG (ref 27–33)
MCHC RBC AUTO-ENTMCNC: 34.8 G/DL (ref 32.2–35.5)
MCV RBC AUTO: 92.8 FL (ref 81.4–97.8)
MONOCYTES # BLD AUTO: 0.25 K/UL (ref 0–0.85)
MONOCYTES NFR BLD AUTO: 5.3 % (ref 0–13.4)
NEUTROPHILS # BLD AUTO: 4.12 K/UL (ref 1.82–7.42)
NEUTROPHILS NFR BLD: 87.7 % (ref 44–72)
NRBC # BLD AUTO: 0 K/UL
NRBC BLD-RTO: 0 /100 WBC (ref 0–0.2)
NT-PROBNP SERPL IA-MCNC: 132 PG/ML (ref 0–125)
PLATELET # BLD AUTO: 145 K/UL (ref 164–446)
PMV BLD AUTO: 12.2 FL (ref 9–12.9)
POTASSIUM SERPL-SCNC: 3.7 MMOL/L (ref 3.6–5.5)
PROT SERPL-MCNC: 7 G/DL (ref 6–8.2)
RBC # BLD AUTO: 4.56 M/UL (ref 4.2–5.4)
SODIUM SERPL-SCNC: 139 MMOL/L (ref 135–145)
TROPONIN T SERPL-MCNC: <6 NG/L (ref 6–19)
WBC # BLD AUTO: 4.7 K/UL (ref 4.8–10.8)

## 2025-03-24 PROCEDURE — 71045 X-RAY EXAM CHEST 1 VIEW: CPT

## 2025-03-24 PROCEDURE — 96375 TX/PRO/DX INJ NEW DRUG ADDON: CPT

## 2025-03-24 PROCEDURE — 84484 ASSAY OF TROPONIN QUANT: CPT

## 2025-03-24 PROCEDURE — 93005 ELECTROCARDIOGRAM TRACING: CPT | Mod: TC

## 2025-03-24 PROCEDURE — 700111 HCHG RX REV CODE 636 W/ 250 OVERRIDE (IP): Mod: JZ,UD | Performed by: EMERGENCY MEDICINE

## 2025-03-24 PROCEDURE — 96374 THER/PROPH/DIAG INJ IV PUSH: CPT

## 2025-03-24 PROCEDURE — 99284 EMERGENCY DEPT VISIT MOD MDM: CPT

## 2025-03-24 PROCEDURE — 36415 COLL VENOUS BLD VENIPUNCTURE: CPT

## 2025-03-24 PROCEDURE — 93005 ELECTROCARDIOGRAM TRACING: CPT | Mod: TC | Performed by: EMERGENCY MEDICINE

## 2025-03-24 PROCEDURE — 80053 COMPREHEN METABOLIC PANEL: CPT

## 2025-03-24 PROCEDURE — 83880 ASSAY OF NATRIURETIC PEPTIDE: CPT

## 2025-03-24 PROCEDURE — 85025 COMPLETE CBC W/AUTO DIFF WBC: CPT

## 2025-03-24 RX ORDER — ONDANSETRON 2 MG/ML
4 INJECTION INTRAMUSCULAR; INTRAVENOUS ONCE
Status: COMPLETED | OUTPATIENT
Start: 2025-03-24 | End: 2025-03-24

## 2025-03-24 RX ORDER — MORPHINE SULFATE 4 MG/ML
4 INJECTION INTRAVENOUS ONCE
Status: COMPLETED | OUTPATIENT
Start: 2025-03-24 | End: 2025-03-24

## 2025-03-24 RX ADMIN — ONDANSETRON 4 MG: 2 INJECTION INTRAMUSCULAR; INTRAVENOUS at 09:51

## 2025-03-24 RX ADMIN — MORPHINE SULFATE 4 MG: 4 INJECTION, SOLUTION INTRAMUSCULAR; INTRAVENOUS at 09:51

## 2025-03-24 ASSESSMENT — FIBROSIS 4 INDEX: FIB4 SCORE: 0.59

## 2025-03-24 NOTE — ED PROVIDER NOTES
ED Provider Note    CHIEF COMPLAINT  Chief Complaint   Patient presents with    Shortness of Breath     Covid dx at urgent care yesterday. Pt increased SOB even after urgent care pt on RA with mask on    Asthma     Pt has hx asthma tried 4 neb and albuterol tx at home without much relief. O2 saturation 98% RA       EXTERNAL RECORDS REVIEWED  Outpatient labs & studies COVID testing yesterday was positive at urgent care .  On February 15, 2025, left heart catheter showed normal coronary arteries    HPI/ROS  LIMITATION TO HISTORY   Select: : None  OUTSIDE HISTORIAN(S):  Family at bedside for discussion history and symptoms    Kristin Balderrama is a 34 y.o. female who presents with difficulty breathing, history of asthma.  She has had symptoms of cough for 2 days, tested positive for COVID-19 yesterday at clinic.  Patient has been having chest pain, described as chest wall pain worse with touch or movement.  She had negative cardiac catheterization in February of this year.  New leg swelling.  There has been no evidence of hypoxia.  She states the oxygen made her feel better.    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    SURGICAL HISTORY   has a past surgical history that includes neuro dest facet l/s w/ig sngl (04/21/2015); recovery (01/27/2016); [place] by laparoscopy (08/29/2010); lumbar fusion anterior (08/21/2012); other cardiac surgery (01/2016); tonsillectomy; bowel stimulator insertion (07/13/2016); gastroscopy with balloon dilatation (N/A, 01/04/2017); muscle biopsy (Right, 01/26/2017); other abdominal surgery; laminotomy; bowel stimulator insertion (03/10/2021); lap,diagnostic abdomen (02/14/2022); ovarian cystectomy (Right, 02/14/2022); percut fix prox/neck femur fx (Left, 01/28/2023); inguinal hernia laparoscopic (Right, 07/21/2023); hernia repair (Right, 07/21/2023); cystoscopy,insert ureteral stent (Right, 2/12/2024); cysto/uretero/pyeloscopy, dx (Right, 2/12/2024); and lasertripsy (Right, 2/12/2024).    FAMILY HISTORY  Family History   Problem Relation Age of Onset    Hypertension Mother     Sleep Apnea Mother     Heart Disease Mother     Other Mother         hypothryod    No Known Problems Sister     Other Sister         Narcolepsy;fibromyalsia;bone;nerve    Genitourinary () Problems Sister         endometriosis    Hypertension Maternal Uncle     Heart Disease Maternal Grandmother     Hypertension Maternal Grandmother     Cancer Neg Hx        SOCIAL HISTORY  Social History     Tobacco Use    Smoking status: Never    Smokeless tobacco: Never   Vaping Use    Vaping status: Never Used   Substance and Sexual Activity    Alcohol use: No     Alcohol/week: 0.0 oz    Drug use: Never    Sexual activity: Not Currently     Birth control/protection: Implant       CURRENT MEDICATIONS  Home Medications       Reviewed by Na Jasmine R.N. (Registered Nurse) on 03/24/25 at 0825  Med List Status: Not Addressed     Medication Last Dose Status   amLODIPine (NORVASC) 5 MG Tab  Active   aspirin 81 MG EC tablet  Active   benzonatate (TESSALON) 100 MG Cap   "Active   clotrimazole-betamethasone (LOTRISONE) 1-0.05 % Cream  Active   dicyclomine (BENTYL) 10 MG Cap  Active   diphenhydrAMINE (BENADRYL) 25 MG Tab  Active   docusate sodium (COLACE) 250 MG capsule  Active   Galcanezumab-gnlm (EMGALITY) 120 MG/ML Solution Auto-injector  Active   isosorbide mononitrate SR (IMDUR) 30 MG TABLET SR 24 HR  Active   ivabradine (CORLANOR) 7.5 MG Tab tablet  Active   lamoTRIgine (LAMICTAL) 25 MG Tab  Active   Melatonin 10 MG Tab  Active   metoprolol SR (TOPROL XL) 50 MG TABLET SR 24 HR  Active   nitroglycerin (NITROSTAT) 0.4 MG SL Tab  Active   omeprazole (PRILOSEC) 20 MG delayed-release capsule  Active   predniSONE (DELTASONE) 10 MG Tab  Active   Ranolazine 1000 MG TABLET SR 12 HR  Active   Rimegepant Sulfate (NURTEC) 75 MG TABLET DISPERSIBLE  Active   scopolamine (TRANSDERM SCOP, 1.5 MG,) 1 mg/72hr PATCH 72 HR  Active   spironolactone (ALDACTONE) 50 MG Tab  Active   sumatriptan (IMITREX) 20 MG/ACT nasal spray  Active   topiramate (TOPAMAX) 50 MG tablet  Active   ziprasidone (GEODON) 40 MG Cap  Active                    ALLERGIES  Allergies   Allergen Reactions    Cefdinir Shortness of Breath and Itching     Tolerated 1/18/17  Tolerates ceftriaxone  Tolerated augmentin 8/2019     Depakote [Divalproex Sodium] Unspecified     Muscle spasms/muscle pain and weakness      Doxycycline Anaphylaxis and Vomiting     Other reaction(s): pustules/blisters  Other reaction(s): stomach pain    Montelukast [Singulair] Unspecified     Cardiac effusion    Vancomycin Itching     Pt becomes flushed in face and chest.   RXN=7/10/16    Wound Dressing Adhesive Rash     By pt report-\"removes skin totally off\"    Amitriptyline Unspecified     Headaches      Amoxicillin Rash      Tolerates augmentin    Aripiprazole [Abilify] Unspecified     Headaches/muscle twitching      Clindamycin Nausea         Other reaction(s): nausea stomach pain    Erythromycin Rash     .  Other reaction(s): nausea stomach pain    Flomax " "[Tamsulosin Hydrochloride] Swelling    Hydromorphone      Other reaction(s): vomiting    Levaquin Unspecified     Severe muscle cramps in legs  RXN=unknown  Other reaction(s): leg muscle cramps    Metformin Unspecified     Increased lactic acid     Other reaction(s): itching and rash/nausea vomiting    Sulfa Drugs Hives, Itching, Myalgia and Unspecified     Muscle pain and weakness    Other reaction(s): unknown    Tamsulosin Swelling     Swelling of legs    Tape Rash     Tears skin off  coban with Tegaderm tape ok intermittently  RXN=ongoing    Sulfamethoxazole W-Trimethoprim Rash    Ciprofloxacin Rash          Keflex Rash     Pt states she gets a rash with this medication  Tolerates ceftriaxone  Can take with Benadryl    Levofloxacin Unspecified     Leg muscle cramps    Metronidazole Rash     \"Vision problems\"  Other reaction(s): vision problems       PHYSICAL EXAM  VITAL SIGNS: /88   Pulse 75   Temp 37.1 °C (98.7 °F) (Temporal)   Resp (!) 24   Ht 1.626 m (5' 4\")   Wt 119 kg (263 lb)   SpO2 98%   BMI 45.14 kg/m²    Respiratory: Clear lung sounds, no crackles or wheezing, moderate air movement  Cardiac: Regular rate, regular rhythm, no murmur  Musculoskeletal: Sternal tenderness to palpation, no crepitance  Skin: No asymmetric edema, no cyanosis, no rash  Neurologic: Sensation and strength normal  Psychiatric: Initially anxious, no calm cooperative  GI: Abdomen is soft and nontender, no guarding  Eyes: No scleral icterus, no conjunctivitis    EKG/LABS  Results for orders placed or performed during the hospital encounter of 25   EKG    Collection Time: 25  8:23 AM   Result Value Ref Range    Report       [facility]    Test Date:  2025  Pt Name:    HARLEY DE LA CRUZ              Department: ER  MRN:        [id number]                      Room:  Gender:     Female                       Technician: [id number]  :        1990                   Requested " By:ER TRIAGE PROTOCOL  Order #:    [id number]                    Reading MD:    Measurements  Intervals                                Axis  Rate:       84                           P:          48  OH:         144                          QRS:        5  QRSD:       79                           T:          262  QT:         397  QTc:        470    Interpretive Statements  Sinus rhythm  LVH by voltage  Nonspecific T abnormalities, diffuse leads  Compared to ECG 03/13/2025 17:28:33  Left ventricular hypertrophy now present  ST (T wave) deviation no longer present  T-wave abnormality still present     CBC with Differential    Collection Time: 03/24/25  8:49 AM   Result Value Ref Range    WBC 4.7 (L) 4.8 - 10.8 K/uL    RBC 4.56 4.20 - 5.40 M/uL    Hemoglobin 14.7 12.0 - 16.0 g/dL    Hematocrit 42.3 37.0 - 47.0 %    MCV 92.8 81.4 - 97.8 fL    MCH 32.2 27.0 - 33.0 pg    MCHC 34.8 32.2 - 35.5 g/dL    RDW 43.1 35.9 - 50.0 fL    Platelet Count 145 (L) 164 - 446 K/uL    MPV 12.2 9.0 - 12.9 fL    Neutrophils-Polys 87.70 (H) 44.00 - 72.00 %    Lymphocytes 5.10 (L) 22.00 - 41.00 %    Monocytes 5.30 0.00 - 13.40 %    Eosinophils 0.90 0.00 - 6.90 %    Basophils 0.60 0.00 - 1.80 %    Immature Granulocytes 0.40 0.00 - 0.90 %    Nucleated RBC 0.00 0.00 - 0.20 /100 WBC    Neutrophils (Absolute) 4.12 1.82 - 7.42 K/uL    Lymphs (Absolute) 0.24 (L) 1.00 - 4.80 K/uL    Monos (Absolute) 0.25 0.00 - 0.85 K/uL    Eos (Absolute) 0.04 0.00 - 0.51 K/uL    Baso (Absolute) 0.03 0.00 - 0.12 K/uL    Immature Granulocytes (abs) 0.02 0.00 - 0.11 K/uL    NRBC (Absolute) 0.00 K/uL   Comp Metabolic Panel    Collection Time: 03/24/25  8:49 AM   Result Value Ref Range    Sodium 139 135 - 145 mmol/L    Potassium 3.7 3.6 - 5.5 mmol/L    Chloride 110 96 - 112 mmol/L    Co2 13 (L) 20 - 33 mmol/L    Anion Gap 16.0 7.0 - 16.0    Glucose 197 (H) 65 - 99 mg/dL    Bun 14 8 - 22 mg/dL    Creatinine 0.92 0.50 - 1.40 mg/dL    Calcium 9.6 8.5 - 10.5 mg/dL    Correct  Calcium 9.0 8.5 - 10.5 mg/dL    AST(SGOT) 25 12 - 45 U/L    ALT(SGPT) 34 2 - 50 U/L    Alkaline Phosphatase 77 30 - 99 U/L    Total Bilirubin 0.4 0.1 - 1.5 mg/dL    Albumin 4.7 3.2 - 4.9 g/dL    Total Protein 7.0 6.0 - 8.2 g/dL    Globulin 2.3 1.9 - 3.5 g/dL    A-G Ratio 2.0 g/dL   proBrain Natriuretic Peptide, NT    Collection Time: 03/24/25  8:49 AM   Result Value Ref Range    NT-proBNP 132 (H) 0 - 125 pg/mL   Troponin    Collection Time: 03/24/25  8:49 AM   Result Value Ref Range    Troponin T <6 6 - 19 ng/L   ESTIMATED GFR    Collection Time: 03/24/25  8:49 AM   Result Value Ref Range    GFR (CKD-EPI) 83 >60 mL/min/1.73 m 2     *Note: Due to a large number of results and/or encounters for the requested time period, some results have not been displayed. A complete set of results can be found in Results Review.       I have independently interpreted this EKG    RADIOLOGY/PROCEDURES   I have independently interpreted the diagnostic imaging associated with this visit and am waiting the final reading from the radiologist.   My preliminary interpretation is as follows: Chest x-ray is negative for lobar pneumonia    Radiologist interpretation:  DX-CHEST-PORTABLE (1 VIEW)   Final Result      1.  Hazy left lower lung opacity. This may represent atelectasis or pneumonia.          COURSE & MEDICAL DECISION MAKING    ASSESSMENT, COURSE AND PLAN  Care Narrative: Patient presents with clear breath sounds, slight abnormality on x-ray however no obvious lobar pneumonia.  I suspect area of inflammation secondary to her COVID.  With no hypoxia, patient appearing calm after 4 mg of morphine, admission to the hospital not indicated.  With over 24 hours of chest discomfort, troponin returns negative, EKG is negative for ischemia.  In addition she had negative cardiac catheterization in February.  Patient does have history of diarrhea, her abdominal exam is benign, no evidence of diverticulitis or other surgical abdominal problem.   Patient is advised to see a doctor for recheck in 1 week if no improvement, to return sooner if worse or for any concerns      DISPOSITION AND DISCUSSIONS    Escalation of care considered, and ultimately not performed:IV fluids were not indicated, no evidence of dehydration      Decision tools and prescription drugs considered including, but not limited to: Antibiotics are not indicated, the patient's illness is viral in nature .    FINAL DIAGNOSIS  1. COVID-19 virus infection    2. SOB (shortness of breath)    3. Diarrhea, unspecified type         Electronically signed by: Laron Dean M.D., 3/24/2025 9:10 AM

## 2025-03-24 NOTE — ED TRIAGE NOTES
"Chief Complaint   Patient presents with    Shortness of Breath     Covid dx at urgent care yesterday. Pt increased SOB even after urgent care pt on RA with mask on    Asthma     Pt has hx asthma tried 4 neb and albuterol tx at home without much relief. O2 saturation 98% RA     Ambulatory to triage for above. Feeling like fevers and chills.    Pt states having chest pain and has taken nitro at home about 4 times. Was recently told by cardiologist after a test that she has reduced coronary flow at rest with ischemia noted and prescribed nitro states took 4 at home over last two days.     Aox4 RA     BP (!) 163/93   Pulse 89   Temp 37.1 °C (98.7 °F) (Temporal)   Resp (!) 22   Ht 1.626 m (5' 4\")   Wt 119 kg (263 lb)   SpO2 98%   BMI 45.14 kg/m²     "

## 2025-03-24 NOTE — Clinical Note
Kristin Balderrama was seen and treated in our emergency department on 3/24/2025.  She may return to work on 03/31/2025.       If you have any questions or concerns, please don't hesitate to call.      Laron Dean M.D.

## 2025-03-24 NOTE — ED NOTES
Pt discharged, all appropriate hospital equipment removed (IV, monitor, pulse ox, etc.). Pt left unit via walking with parent to vehicle for home. Personal belongings with pt when leaving unit. Pt given discharge instructions prior to leaving ER, including where to  prescriptions and when to follow-up if applicable; verbalizes understanding. Pt informed to return to ED if symptoms worsen/return or altered status develop. Copy of discharge instructions signed and turned into DC basket and copy sent with pt. F/u with PCP

## 2025-03-26 ENCOUNTER — APPOINTMENT (OUTPATIENT)
Dept: RADIOLOGY | Facility: MEDICAL CENTER | Age: 35
End: 2025-03-26
Payer: MEDICARE

## 2025-03-26 ENCOUNTER — OFFICE VISIT (OUTPATIENT)
Dept: URGENT CARE | Facility: CLINIC | Age: 35
End: 2025-03-26
Payer: MEDICARE

## 2025-03-26 ENCOUNTER — HOSPITAL ENCOUNTER (EMERGENCY)
Facility: MEDICAL CENTER | Age: 35
End: 2025-03-26
Attending: EMERGENCY MEDICINE
Payer: MEDICARE

## 2025-03-26 VITALS
DIASTOLIC BLOOD PRESSURE: 78 MMHG | SYSTOLIC BLOOD PRESSURE: 124 MMHG | HEIGHT: 64 IN | TEMPERATURE: 97.7 F | WEIGHT: 263 LBS | OXYGEN SATURATION: 97 % | BODY MASS INDEX: 44.9 KG/M2 | HEART RATE: 67 BPM | RESPIRATION RATE: 24 BRPM

## 2025-03-26 VITALS
HEART RATE: 67 BPM | TEMPERATURE: 97.2 F | OXYGEN SATURATION: 94 % | DIASTOLIC BLOOD PRESSURE: 73 MMHG | RESPIRATION RATE: 14 BRPM | SYSTOLIC BLOOD PRESSURE: 136 MMHG

## 2025-03-26 DIAGNOSIS — R06.02 SOB (SHORTNESS OF BREATH): ICD-10-CM

## 2025-03-26 DIAGNOSIS — U07.1 COVID: ICD-10-CM

## 2025-03-26 DIAGNOSIS — R06.02 SHORTNESS OF BREATH: ICD-10-CM

## 2025-03-26 DIAGNOSIS — R06.82 TACHYPNEA: ICD-10-CM

## 2025-03-26 LAB
ALBUMIN SERPL BCP-MCNC: 4.7 G/DL (ref 3.2–4.9)
ALBUMIN/GLOB SERPL: 2 G/DL
ALP SERPL-CCNC: 76 U/L (ref 30–99)
ALT SERPL-CCNC: 30 U/L (ref 2–50)
ANION GAP SERPL CALC-SCNC: 12 MMOL/L (ref 7–16)
AST SERPL-CCNC: 22 U/L (ref 12–45)
BASOPHILS # BLD AUTO: 0.4 % (ref 0–1.8)
BASOPHILS # BLD: 0.02 K/UL (ref 0–0.12)
BILIRUB SERPL-MCNC: 0.3 MG/DL (ref 0.1–1.5)
BUN SERPL-MCNC: 17 MG/DL (ref 8–22)
CALCIUM ALBUM COR SERPL-MCNC: 8.8 MG/DL (ref 8.5–10.5)
CALCIUM SERPL-MCNC: 9.4 MG/DL (ref 8.5–10.5)
CHLORIDE SERPL-SCNC: 109 MMOL/L (ref 96–112)
CO2 SERPL-SCNC: 15 MMOL/L (ref 20–33)
CREAT SERPL-MCNC: 0.94 MG/DL (ref 0.5–1.4)
EKG IMPRESSION: NORMAL
EKG IMPRESSION: NORMAL
EOSINOPHIL # BLD AUTO: 0.01 K/UL (ref 0–0.51)
EOSINOPHIL NFR BLD: 0.2 % (ref 0–6.9)
ERYTHROCYTE [DISTWIDTH] IN BLOOD BY AUTOMATED COUNT: 45.1 FL (ref 35.9–50)
GFR SERPLBLD CREATININE-BSD FMLA CKD-EPI: 81 ML/MIN/1.73 M 2
GLOBULIN SER CALC-MCNC: 2.4 G/DL (ref 1.9–3.5)
GLUCOSE SERPL-MCNC: 184 MG/DL (ref 65–99)
HCT VFR BLD AUTO: 43.8 % (ref 37–47)
HGB BLD-MCNC: 14.4 G/DL (ref 12–16)
IMM GRANULOCYTES # BLD AUTO: 0.03 K/UL (ref 0–0.11)
IMM GRANULOCYTES NFR BLD AUTO: 0.6 % (ref 0–0.9)
LYMPHOCYTES # BLD AUTO: 0.49 K/UL (ref 1–4.8)
LYMPHOCYTES NFR BLD: 10.4 % (ref 22–41)
MCH RBC QN AUTO: 31.8 PG (ref 27–33)
MCHC RBC AUTO-ENTMCNC: 32.9 G/DL (ref 32.2–35.5)
MCV RBC AUTO: 96.7 FL (ref 81.4–97.8)
MONOCYTES # BLD AUTO: 0.35 K/UL (ref 0–0.85)
MONOCYTES NFR BLD AUTO: 7.5 % (ref 0–13.4)
NEUTROPHILS # BLD AUTO: 3.79 K/UL (ref 1.82–7.42)
NEUTROPHILS NFR BLD: 80.9 % (ref 44–72)
NRBC # BLD AUTO: 0 K/UL
NRBC BLD-RTO: 0 /100 WBC (ref 0–0.2)
NT-PROBNP SERPL IA-MCNC: 150 PG/ML (ref 0–125)
PLATELET # BLD AUTO: 147 K/UL (ref 164–446)
PMV BLD AUTO: 12.4 FL (ref 9–12.9)
POTASSIUM SERPL-SCNC: 4.3 MMOL/L (ref 3.6–5.5)
PROT SERPL-MCNC: 7.1 G/DL (ref 6–8.2)
RBC # BLD AUTO: 4.53 M/UL (ref 4.2–5.4)
SODIUM SERPL-SCNC: 136 MMOL/L (ref 135–145)
TROPONIN T SERPL-MCNC: <6 NG/L (ref 6–19)
WBC # BLD AUTO: 4.7 K/UL (ref 4.8–10.8)

## 2025-03-26 PROCEDURE — 83880 ASSAY OF NATRIURETIC PEPTIDE: CPT

## 2025-03-26 PROCEDURE — 84484 ASSAY OF TROPONIN QUANT: CPT

## 2025-03-26 PROCEDURE — 80053 COMPREHEN METABOLIC PANEL: CPT

## 2025-03-26 PROCEDURE — 36415 COLL VENOUS BLD VENIPUNCTURE: CPT

## 2025-03-26 PROCEDURE — 93005 ELECTROCARDIOGRAM TRACING: CPT | Mod: TC | Performed by: EMERGENCY MEDICINE

## 2025-03-26 PROCEDURE — 93005 ELECTROCARDIOGRAM TRACING: CPT | Mod: TC

## 2025-03-26 PROCEDURE — 3078F DIAST BP <80 MM HG: CPT | Performed by: STUDENT IN AN ORGANIZED HEALTH CARE EDUCATION/TRAINING PROGRAM

## 2025-03-26 PROCEDURE — 85025 COMPLETE CBC W/AUTO DIFF WBC: CPT

## 2025-03-26 PROCEDURE — 99215 OFFICE O/P EST HI 40 MIN: CPT | Performed by: STUDENT IN AN ORGANIZED HEALTH CARE EDUCATION/TRAINING PROGRAM

## 2025-03-26 PROCEDURE — RXMED WILLOW AMBULATORY MEDICATION CHARGE: Performed by: NURSE PRACTITIONER

## 2025-03-26 PROCEDURE — 99284 EMERGENCY DEPT VISIT MOD MDM: CPT

## 2025-03-26 PROCEDURE — 71045 X-RAY EXAM CHEST 1 VIEW: CPT

## 2025-03-26 PROCEDURE — 3074F SYST BP LT 130 MM HG: CPT | Performed by: STUDENT IN AN ORGANIZED HEALTH CARE EDUCATION/TRAINING PROGRAM

## 2025-03-26 ASSESSMENT — ENCOUNTER SYMPTOMS
PALPITATIONS: 0
CHILLS: 0
COUGH: 1
FEVER: 0
WHEEZING: 1
SHORTNESS OF BREATH: 1
SORE THROAT: 1

## 2025-03-26 ASSESSMENT — FIBROSIS 4 INDEX: FIB4 SCORE: 1.03

## 2025-03-26 NOTE — ED NOTES
Pt educated on discharge instructions. All questions & concerns addressed. Vitals re-assessed and at pt's baseline.     No IV to d/c     Pt brought to exit via wheelchair with all belongings.

## 2025-03-26 NOTE — ED TRIAGE NOTES
Kristin Balderrama  34 y.o. female  Chief Complaint   Patient presents with    Shortness of Breath     Since 3/24. Report no relief from neb treatment       Pt to triage via wheelchair. Pt seen here on 3/24, at  on 3/23 and 3/26 for similar symptoms. Diagnosed with COVID on 3/23  Pt is alert and oriented, speaking in one to two word sentences, follows commands and responds appropriately to questions. Not in any apparent distress. Respirations are even and unlabored.  Pt placed in line for EKG. Pt educated on triage process. Pt encouraged to alert staff for any changes.

## 2025-03-26 NOTE — ED PROVIDER NOTES
ED Provider Note    CHIEF COMPLAINT  Chief Complaint   Patient presents with    Shortness of Breath     Since 3/24. Report no relief from neb treatment       EXTERNAL RECORDS REVIEWED  Urgent treatment center note today sent for concerns of respiratory distress.  Patient was also in our facility 2 days prior with negative workup.  She was COVID-positive earlier last week.    HPI/ROS  LIMITATION TO HISTORY     OUTSIDE HISTORIAN(S):      Kristin Balderrama is a 34 y.o. female who presents to the emergency department chief complaint of shortness of breath.  Feels as though her prednisone and albuterol/DuoNebs are not working.  Feels as though she has tightness throughout her chest.  No pain no fevers [place] nonproductive no other acute symptom changes or concerns at this time.  She was at urgent treatment center earlier and reportedly had respiratory distress and wheezing and was sent here for further evaluation.    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    SURGICAL HISTORY   has a past surgical history that includes neuro dest facet l/s w/ig  sngl (04/21/2015); recovery (01/27/2016); [place] by laparoscopy (08/29/2010); lumbar fusion anterior (08/21/2012); other cardiac surgery (01/2016); tonsillectomy; bowel stimulator insertion (07/13/2016); gastroscopy with balloon dilatation (N/A, 01/04/2017); muscle biopsy (Right, 01/26/2017); other abdominal surgery; laminotomy; bowel stimulator insertion (03/10/2021); lap,diagnostic abdomen (02/14/2022); ovarian cystectomy (Right, 02/14/2022); percut fix prox/neck femur fx (Left, 01/28/2023); inguinal hernia laparoscopic (Right, 07/21/2023); hernia repair (Right, 07/21/2023); cystoscopy,insert ureteral stent (Right, 2/12/2024); cysto/uretero/pyeloscopy, dx (Right, 2/12/2024); and lasertripsy (Right, 2/12/2024).    FAMILY HISTORY  Family History   Problem Relation Age of Onset    Hypertension Mother     Sleep Apnea Mother     Heart Disease Mother     Other Mother         hypothryod    No Known Problems Sister     Other Sister         Narcolepsy;fibromyalsia;bone;nerve    Genitourinary () Problems Sister         endometriosis    Hypertension Maternal Uncle     Heart Disease Maternal Grandmother     Hypertension Maternal Grandmother     Cancer Neg Hx        SOCIAL HISTORY  Social History     Tobacco Use    Smoking status: Never    Smokeless tobacco: Never   Vaping Use    Vaping status: Never Used   Substance and Sexual Activity    Alcohol use: No     Alcohol/week: 0.0 oz    Drug use: Never    Sexual activity: Not Currently     Birth control/protection: Implant       CURRENT MEDICATIONS  Home Medications       Reviewed by Cassius Emery R.N. (Registered Nurse) on 03/26/25 at 1048  Med List Status: Not Addressed     Medication Last Dose Status   amLODIPine (NORVASC) 5 MG Tab  Active   aspirin 81 MG EC tablet  Active   benzonatate (TESSALON) 100 MG Cap  Active   clotrimazole-betamethasone (LOTRISONE) 1-0.05 % Cream  Active   dicyclomine (BENTYL) 10 MG Cap  Active   diphenhydrAMINE (BENADRYL) 25 MG Tab  Active   docusate  "sodium (COLACE) 250 MG capsule  Active   Galcanezumab-gnlm (EMGALITY) 120 MG/ML Solution Auto-injector  Active   isosorbide mononitrate SR (IMDUR) 30 MG TABLET SR 24 HR  Active   ivabradine (CORLANOR) 7.5 MG Tab tablet  Active   lamoTRIgine (LAMICTAL) 25 MG Tab  Active   Melatonin 10 MG Tab  Active   metoprolol SR (TOPROL XL) 50 MG TABLET SR 24 HR  Active   nitroglycerin (NITROSTAT) 0.4 MG SL Tab  Active   omeprazole (PRILOSEC) 20 MG delayed-release capsule  Active   predniSONE (DELTASONE) 10 MG Tab  Active   Ranolazine 1000 MG TABLET SR 12 HR  Active   Rimegepant Sulfate (NURTEC) 75 MG TABLET DISPERSIBLE  Active   scopolamine (TRANSDERM SCOP, 1.5 MG,) 1 mg/72hr PATCH 72 HR  Active   spironolactone (ALDACTONE) 50 MG Tab  Active   sumatriptan (IMITREX) 20 MG/ACT nasal spray  Active   topiramate (TOPAMAX) 50 MG tablet  Active   ziprasidone (GEODON) 40 MG Cap  Active                  Audit from Redirected Encounters    **Home medications have not yet been reviewed for this encounter**         ALLERGIES  Allergies   Allergen Reactions    Cefdinir Shortness of Breath and Itching     Tolerated 1/18/17  Tolerates ceftriaxone  Tolerated augmentin 8/2019     Depakote [Divalproex Sodium] Unspecified     Muscle spasms/muscle pain and weakness      Doxycycline Anaphylaxis and Vomiting     Other reaction(s): pustules/blisters  Other reaction(s): stomach pain    Montelukast [Singulair] Unspecified     Cardiac effusion    Vancomycin Itching     Pt becomes flushed in face and chest.   RXN=7/10/16    Wound Dressing Adhesive Rash     By pt report-\"removes skin totally off\"    Amitriptyline Unspecified     Headaches      Amoxicillin Rash      Tolerates augmentin    Aripiprazole [Abilify] Unspecified     Headaches/muscle twitching      Clindamycin Nausea         Other reaction(s): nausea stomach pain    Erythromycin Rash     .  Other reaction(s): nausea stomach pain    Flomax [Tamsulosin Hydrochloride] Swelling    Hydromorphone      " "Other reaction(s): vomiting    Levaquin Unspecified     Severe muscle cramps in legs  RXN=unknown  Other reaction(s): leg muscle cramps    Metformin Unspecified     Increased lactic acid     Other reaction(s): itching and rash/nausea vomiting    Sulfa Drugs Hives, Itching, Myalgia and Unspecified     Muscle pain and weakness    Other reaction(s): unknown    Tamsulosin Swelling     Swelling of legs    Tape Rash     Tears skin off  coban with Tegaderm tape ok intermittently  RXN=ongoing    Sulfamethoxazole W-Trimethoprim Rash    Ciprofloxacin Rash          Keflex Rash     Pt states she gets a rash with this medication  Tolerates ceftriaxone  Can take with Benadryl    Levofloxacin Unspecified     Leg muscle cramps    Metronidazole Rash     \"Vision problems\"  Other reaction(s): vision problems       PHYSICAL EXAM  VITAL SIGNS: BP (!) 165/108   Pulse 76   Temp 36.2 °C (97.2 °F) (Temporal)   Resp (!) 22   SpO2 96%      Pulse ox interpretation: I interpret this pulse ox as normal.  Constitutional: Alert and oriented x 3, minimal distress  HEENT: Atraumatic normocephalic, pupils are equal round reactive to light extraocular movements are intact. The nares is clear, external ears are normal, mouth shows moist mucous membranes normal dentition for age  Neck: Supple, no JVD no tracheal deviation  Cardiovascular: Regular rate and rhythm no murmur rub or gallop 2+ pulses peripherally x4  Thorax & Lungs: No respiratory distress, no wheezes rales or rhonchi, No chest tenderness.   GI: Soft nontender nondistended positive bowel sounds, no peritoneal signs  Skin: Warm dry no acute rash or lesion  Musculoskeletal: Moving all extremities with full range and 5 of 5 strength no acute  deformity  Neurologic: Cranial nerves III through XII are grossly intact no sensory deficit no cerebellar dysfunction   Psychiatric: Appropriate affect for situation at this time      EKG/LABS  Results for orders placed or performed during the hospital " encounter of 25   EKG    Collection Time: 25 10:54 AM   Result Value Ref Range    Report       [facility] Emergency Dept.    Test Date:  2025  Pt Name:    HARLEY DE LA CRUZ              Department: ER  MRN:        [id number]                      Room:  Gender:     Female                       Technician: [id number]  :        1990                   Requested By:ER TRIAGE PROTOCOL  Order #:    [id number]                    Reading MD:    Measurements  Intervals                                Axis  Rate:       67                           P:          30  NC:         135                          QRS:        4  QRSD:       95                           T:          3  QT:         432  QTc:        456    Interpretive Statements  Sinus rhythm  Probable left ventricular hypertrophy  Compared to ECG 2025 08:23:53  T-wave abnormality no longer present     CBC with Differential    Collection Time: 25 11:04 AM   Result Value Ref Range    WBC 4.7 (L) 4.8 - 10.8 K/uL    RBC 4.53 4.20 - 5.40 M/uL    Hemoglobin 14.4 12.0 - 16.0 g/dL    Hematocrit 43.8 37.0 - 47.0 %    MCV 96.7 81.4 - 97.8 fL    MCH 31.8 27.0 - 33.0 pg    MCHC 32.9 32.2 - 35.5 g/dL    RDW 45.1 35.9 - 50.0 fL    Platelet Count 147 (L) 164 - 446 K/uL    MPV 12.4 9.0 - 12.9 fL    Neutrophils-Polys 80.90 (H) 44.00 - 72.00 %    Lymphocytes 10.40 (L) 22.00 - 41.00 %    Monocytes 7.50 0.00 - 13.40 %    Eosinophils 0.20 0.00 - 6.90 %    Basophils 0.40 0.00 - 1.80 %    Immature Granulocytes 0.60 0.00 - 0.90 %    Nucleated RBC 0.00 0.00 - 0.20 /100 WBC    Neutrophils (Absolute) 3.79 1.82 - 7.42 K/uL    Lymphs (Absolute) 0.49 (L) 1.00 - 4.80 K/uL    Monos (Absolute) 0.35 0.00 - 0.85 K/uL    Eos (Absolute) 0.01 0.00 - 0.51 K/uL    Baso (Absolute) 0.02 0.00 - 0.12 K/uL    Immature Granulocytes (abs) 0.03 0.00 - 0.11 K/uL    NRBC (Absolute) 0.00 K/uL   Comp Metabolic Panel    Collection Time: 25 11:04 AM   Result Value Ref Range     Sodium 136 135 - 145 mmol/L    Potassium 4.3 3.6 - 5.5 mmol/L    Chloride 109 96 - 112 mmol/L    Co2 15 (L) 20 - 33 mmol/L    Anion Gap 12.0 7.0 - 16.0    Glucose 184 (H) 65 - 99 mg/dL    Bun 17 8 - 22 mg/dL    Creatinine 0.94 0.50 - 1.40 mg/dL    Calcium 9.4 8.5 - 10.5 mg/dL    Correct Calcium 8.8 8.5 - 10.5 mg/dL    AST(SGOT) 22 12 - 45 U/L    ALT(SGPT) 30 2 - 50 U/L    Alkaline Phosphatase 76 30 - 99 U/L    Total Bilirubin 0.3 0.1 - 1.5 mg/dL    Albumin 4.7 3.2 - 4.9 g/dL    Total Protein 7.1 6.0 - 8.2 g/dL    Globulin 2.4 1.9 - 3.5 g/dL    A-G Ratio 2.0 g/dL   proBrain Natriuretic Peptide, NT    Collection Time: 25 11:04 AM   Result Value Ref Range    NT-proBNP 150 (H) 0 - 125 pg/mL   Troponin    Collection Time: 25 11:04 AM   Result Value Ref Range    Troponin T <6 6 - 19 ng/L   ESTIMATED GFR    Collection Time: 25 11:04 AM   Result Value Ref Range    GFR (CKD-EPI) 81 >60 mL/min/1.73 m 2   EKG    Collection Time: 25 12:08 PM   Result Value Ref Range    Report       [facility] Emergency Dept.    Test Date:  2025  Pt Name:    HARLEY DE LA CRUZ              Department: ER  MRN:        [id number]                      Room:  Gender:     Female                       Technician: [id number]  :        1990                   Requested By:ER TRIAGE PROTOCOL  Order #:    [id number]                    Reading MD: EDA GARCIA MD    Measurements  Intervals                                Axis  Rate:       67                           P:          30  AR:         135                          QRS:        4  QRSD:       95                           T:          3  QT:         432  QTc:        456    Interpretive Statements  Sinus rhythm  Probable left ventricular hypertrophy  Compared to ECG 2025 08:23:53  T-wave abnormality no longer present  Electronically Signed On 2025 12:08:41 PDT by EDA GARCIA MD       *Note: Due to a large number of results and/or  encounters for the requested time period, some results have not been displayed. A complete set of results can be found in Results Review.       I have independently interpreted this EKG    RADIOLOGY/PROCEDURES   I have independently interpreted the diagnostic imaging associated with this visit and am waiting the final reading from the radiologist.   My preliminary interpretation is as follows:   No focal consolidation    Radiologist interpretation:  DX-CHEST-PORTABLE (1 VIEW)    (Results Pending)       COURSE & MEDICAL DECISION MAKING    ASSESSMENT, COURSE AND PLAN  Care Narrative: Patient's vital signs are completely unremarkable here.  EKG at a rate of 67 without signs of right heart strain or any other acute abnormalities.  Triage protocol entered BNP and troponin which are unremarkable at this time.  Chest x-ray shows no acute focal consolidation or other acute abnormality.  Patient is saturating 98% while wearing an N95.  Patient very well-known to our facility for similar presentations.  At this point I do not feel as though any further steroids any further breathing treatments or other interventions are necessary or warranted.  Patient given instructions to return for worsening symptom changes or concerns otherwise discharged in stable and improved condition.         ADDITIONAL PROBLEMS MANAGED    DISPOSITION AND DISCUSSIONS  I have discussed management of the patient with the following physicians and ARIES's:      Discussion of management with other QHP or appropriate source(s):      Escalation of care considered, and ultimately not performed:acute inpatient care management, however at this time, the patient is most appropriate for outpatient management    Barriers to care at this time, including but not limited to: .     Decision tools and prescription drugs considered including, but not limited to: .  /73   Pulse 67   Temp 36.2 °C (97.2 °F) (Temporal)   Resp 14   SpO2 94%     Fly Goodson,  M.D.  [address]  [facility] [address]  [phone]          [facility], Emergency Dept  [address] [facility] [id number] [phone]    in 12-24 hours if symptoms persist, immediately If symptoms worsen, or if you develop any other symptoms or concerns      FINAL DIAGNOSIS  1. Shortness of breath Inactive        Electronically signed by: Bolivar Salcedo M.D.

## 2025-03-26 NOTE — PROGRESS NOTES
"Subjective     Kristin Balderrama is a 34 y.o. female who presents with Shortness of Breath (COVID +, was in the ER on Monday, not improving )            Kristin is a 34 y.o. female who presents to urgent care with worsening shortness of breath.  Patient was in the ER on Monday and was diagnosed with COVID.  Patient states she is on prednisone 40 mg daily.  She has also been using albuterol and DuoNeb at home constantly.  States shortness of breath is worsening.        Review of Systems   Constitutional:  Negative for chills and fever.   HENT:  Positive for congestion and sore throat. Negative for ear pain.    Respiratory:  Positive for cough, shortness of breath and wheezing.    Cardiovascular:  Negative for chest pain and palpitations.   All other systems reviewed and are negative.             Objective     /78   Pulse 67   Temp 36.5 °C (97.7 °F)   Resp (!) 22   Ht 1.626 m (5' 4\")   Wt 119 kg (263 lb)   SpO2 97%   BMI 45.14 kg/m²      Physical Exam  Vitals reviewed.   Constitutional:       Appearance: Normal appearance.   HENT:      Head: Normocephalic and atraumatic.      Nose: Nose normal.      Mouth/Throat:      Mouth: Mucous membranes are moist.      Pharynx: Oropharynx is clear.   Eyes:      Extraocular Movements: Extraocular movements intact.      Conjunctiva/sclera: Conjunctivae normal.      Pupils: Pupils are equal, round, and reactive to light.   Pulmonary:      Effort: Tachypnea present.      Breath sounds: Normal breath sounds. No stridor. No wheezing, rhonchi or rales.      Comments: Difficulty speaking in complete sentences due to SOB  Neurological:      Mental Status: She is alert.                                  Assessment & Plan  SOB (shortness of breath)         Tachypnea         COVID              Recommended ER transfer for further evaluation and management due to worsening shortness of breath since ER visit on Monday, 3/24/2025.  In clinic.  Patient is having difficulty speaking " in full sentences due to shortness of breath.  Patient agreeable to ER transfer.  Declined EMS.  Will arrive to [facility] ER by POV.

## 2025-03-27 ENCOUNTER — APPOINTMENT (OUTPATIENT)
Dept: NEUROLOGY | Facility: MEDICAL CENTER | Age: 35
End: 2025-03-27
Attending: SPECIALIST
Payer: MEDICARE

## 2025-03-29 ENCOUNTER — PHARMACY VISIT (OUTPATIENT)
Dept: PHARMACY | Facility: MEDICAL CENTER | Age: 35
End: 2025-03-29
Payer: COMMERCIAL

## 2025-03-29 PROCEDURE — RXMED WILLOW AMBULATORY MEDICATION CHARGE: Performed by: STUDENT IN AN ORGANIZED HEALTH CARE EDUCATION/TRAINING PROGRAM

## 2025-03-29 PROCEDURE — RXMED WILLOW AMBULATORY MEDICATION CHARGE

## 2025-03-31 ENCOUNTER — APPOINTMENT (OUTPATIENT)
Dept: RADIOLOGY | Facility: MEDICAL CENTER | Age: 35
End: 2025-03-31
Attending: EMERGENCY MEDICINE
Payer: MEDICARE

## 2025-03-31 ENCOUNTER — HOSPITAL ENCOUNTER (EMERGENCY)
Facility: MEDICAL CENTER | Age: 35
End: 2025-03-31
Attending: EMERGENCY MEDICINE
Payer: MEDICARE

## 2025-03-31 VITALS
DIASTOLIC BLOOD PRESSURE: 85 MMHG | HEIGHT: 64 IN | SYSTOLIC BLOOD PRESSURE: 148 MMHG | WEIGHT: 261.25 LBS | TEMPERATURE: 97.9 F | BODY MASS INDEX: 44.6 KG/M2 | HEART RATE: 70 BPM | RESPIRATION RATE: 20 BRPM | OXYGEN SATURATION: 97 %

## 2025-03-31 DIAGNOSIS — R07.2 PRECORDIAL CHEST PAIN: ICD-10-CM

## 2025-03-31 LAB
ALBUMIN SERPL BCP-MCNC: 4.6 G/DL (ref 3.2–4.9)
ALBUMIN/GLOB SERPL: 2.2 G/DL
ALP SERPL-CCNC: 77 U/L (ref 30–99)
ALT SERPL-CCNC: 31 U/L (ref 2–50)
ANION GAP SERPL CALC-SCNC: 10 MMOL/L (ref 7–16)
AST SERPL-CCNC: 15 U/L (ref 12–45)
BASOPHILS # BLD AUTO: 0.5 % (ref 0–1.8)
BASOPHILS # BLD: 0.03 K/UL (ref 0–0.12)
BILIRUB SERPL-MCNC: 0.3 MG/DL (ref 0.1–1.5)
BUN SERPL-MCNC: 14 MG/DL (ref 8–22)
CALCIUM ALBUM COR SERPL-MCNC: 9.2 MG/DL (ref 8.5–10.5)
CALCIUM SERPL-MCNC: 9.7 MG/DL (ref 8.5–10.5)
CHLORIDE SERPL-SCNC: 106 MMOL/L (ref 96–112)
CO2 SERPL-SCNC: 21 MMOL/L (ref 20–33)
CREAT SERPL-MCNC: 0.95 MG/DL (ref 0.5–1.4)
EKG IMPRESSION: NORMAL
EOSINOPHIL # BLD AUTO: 0.12 K/UL (ref 0–0.51)
EOSINOPHIL NFR BLD: 2.1 % (ref 0–6.9)
ERYTHROCYTE [DISTWIDTH] IN BLOOD BY AUTOMATED COUNT: 42 FL (ref 35.9–50)
GFR SERPLBLD CREATININE-BSD FMLA CKD-EPI: 80 ML/MIN/1.73 M 2
GLOBULIN SER CALC-MCNC: 2.1 G/DL (ref 1.9–3.5)
GLUCOSE SERPL-MCNC: 146 MG/DL (ref 65–99)
HCG SERPL QL: NEGATIVE
HCT VFR BLD AUTO: 42.2 % (ref 37–47)
HGB BLD-MCNC: 14.6 G/DL (ref 12–16)
IMM GRANULOCYTES # BLD AUTO: 0.03 K/UL (ref 0–0.11)
IMM GRANULOCYTES NFR BLD AUTO: 0.5 % (ref 0–0.9)
LIPASE SERPL-CCNC: 42 U/L (ref 11–82)
LYMPHOCYTES # BLD AUTO: 1.73 K/UL (ref 1–4.8)
LYMPHOCYTES NFR BLD: 30.9 % (ref 22–41)
MCH RBC QN AUTO: 32.2 PG (ref 27–33)
MCHC RBC AUTO-ENTMCNC: 34.6 G/DL (ref 32.2–35.5)
MCV RBC AUTO: 93 FL (ref 81.4–97.8)
MONOCYTES # BLD AUTO: 0.51 K/UL (ref 0–0.85)
MONOCYTES NFR BLD AUTO: 9.1 % (ref 0–13.4)
NEUTROPHILS # BLD AUTO: 3.18 K/UL (ref 1.82–7.42)
NEUTROPHILS NFR BLD: 56.9 % (ref 44–72)
NRBC # BLD AUTO: 0 K/UL
NRBC BLD-RTO: 0 /100 WBC (ref 0–0.2)
PLATELET # BLD AUTO: 180 K/UL (ref 164–446)
PMV BLD AUTO: 11.6 FL (ref 9–12.9)
POTASSIUM SERPL-SCNC: 4 MMOL/L (ref 3.6–5.5)
PROT SERPL-MCNC: 6.7 G/DL (ref 6–8.2)
RBC # BLD AUTO: 4.54 M/UL (ref 4.2–5.4)
SODIUM SERPL-SCNC: 137 MMOL/L (ref 135–145)
TROPONIN T SERPL-MCNC: 8 NG/L (ref 6–19)
WBC # BLD AUTO: 5.6 K/UL (ref 4.8–10.8)

## 2025-03-31 PROCEDURE — 85025 COMPLETE CBC W/AUTO DIFF WBC: CPT

## 2025-03-31 PROCEDURE — 96375 TX/PRO/DX INJ NEW DRUG ADDON: CPT | Mod: XU

## 2025-03-31 PROCEDURE — 93005 ELECTROCARDIOGRAM TRACING: CPT | Mod: TC

## 2025-03-31 PROCEDURE — 84703 CHORIONIC GONADOTROPIN ASSAY: CPT

## 2025-03-31 PROCEDURE — 80053 COMPREHEN METABOLIC PANEL: CPT

## 2025-03-31 PROCEDURE — RXMED WILLOW AMBULATORY MEDICATION CHARGE: Performed by: NURSE PRACTITIONER

## 2025-03-31 PROCEDURE — 93005 ELECTROCARDIOGRAM TRACING: CPT | Mod: TC | Performed by: EMERGENCY MEDICINE

## 2025-03-31 PROCEDURE — 96374 THER/PROPH/DIAG INJ IV PUSH: CPT | Mod: XU

## 2025-03-31 PROCEDURE — 700111 HCHG RX REV CODE 636 W/ 250 OVERRIDE (IP): Mod: JZ | Performed by: EMERGENCY MEDICINE

## 2025-03-31 PROCEDURE — 36415 COLL VENOUS BLD VENIPUNCTURE: CPT

## 2025-03-31 PROCEDURE — 700117 HCHG RX CONTRAST REV CODE 255: Performed by: EMERGENCY MEDICINE

## 2025-03-31 PROCEDURE — 71275 CT ANGIOGRAPHY CHEST: CPT

## 2025-03-31 PROCEDURE — 99284 EMERGENCY DEPT VISIT MOD MDM: CPT

## 2025-03-31 PROCEDURE — RXMED WILLOW AMBULATORY MEDICATION CHARGE: Performed by: EMERGENCY MEDICINE

## 2025-03-31 PROCEDURE — 84484 ASSAY OF TROPONIN QUANT: CPT

## 2025-03-31 PROCEDURE — 83690 ASSAY OF LIPASE: CPT

## 2025-03-31 RX ORDER — METHYLPREDNISOLONE 4 MG/1
TABLET ORAL
Qty: 21 EACH | Refills: 0 | Status: SHIPPED | OUTPATIENT
Start: 2025-03-31

## 2025-03-31 RX ORDER — MORPHINE SULFATE 4 MG/ML
4 INJECTION INTRAVENOUS ONCE
Status: COMPLETED | OUTPATIENT
Start: 2025-03-31 | End: 2025-03-31

## 2025-03-31 RX ORDER — ONDANSETRON 2 MG/ML
4 INJECTION INTRAMUSCULAR; INTRAVENOUS ONCE
Status: COMPLETED | OUTPATIENT
Start: 2025-03-31 | End: 2025-03-31

## 2025-03-31 RX ADMIN — ONDANSETRON 4 MG: 2 INJECTION INTRAMUSCULAR; INTRAVENOUS at 15:30

## 2025-03-31 RX ADMIN — IOHEXOL 100 ML: 350 INJECTION, SOLUTION INTRAVENOUS at 17:51

## 2025-03-31 RX ADMIN — MORPHINE SULFATE 4 MG: 4 INJECTION, SOLUTION INTRAMUSCULAR; INTRAVENOUS at 15:30

## 2025-03-31 ASSESSMENT — HEART SCORE
HISTORY: SLIGHTLY SUSPICIOUS
HEART SCORE: 2
AGE: <45
TROPONIN: LESS THAN OR EQUAL TO NORMAL LIMIT
ECG: NORMAL
RISK FACTORS: >2 RISK FACTORS OR HX OF ATHEROSCLEROTIC DISEASE

## 2025-03-31 ASSESSMENT — FIBROSIS 4 INDEX: FIB4 SCORE: 0.96

## 2025-03-31 NOTE — ED TRIAGE NOTES
"BIB self for following complaints.     Chief Complaint   Patient presents with    Chest Pain     Mid chest pain into left scapula for the past couple days pt states she had covid 7 days ago and is still getting over this. Denies sob. Denies nausea or vomiting. Endorses constant pain.      BP (!) 162/97   Pulse 79   Temp 36.6 °C (97.9 °F) (Temporal)   Resp 18   Ht 1.626 m (5' 4\")   Wt 118 kg (261 lb 3.9 oz)   SpO2 97%   Breastfeeding No   BMI 44.84 kg/m²     "

## 2025-03-31 NOTE — ED PROVIDER NOTES
ER Provider Note    Scribed for Vikram Dunlap D.O. by Chino Tello. 3/31/2025  3:04 PM    Primary Care Provider: Fly Goodson M.D.    CHIEF COMPLAINT  Chief Complaint   Patient presents with    Chest Pain     Mid chest pain into left scapula for the past couple days pt states she had covid 7 days ago and is still getting over this. Denies sob. Denies nausea or vomiting. Endorses constant pain.      HPI/ROS    Kristin Balderrama is a 34 y.o. female who presents to the Emergency Department for evaluation of constant left sided chest pain onset a few days ago. Patient explains that she has had some central left sided chest pain that radiates to the back of her scapula for the past few days, which prompted visitation to the ED today. States associated clamminess but denies any shortness of breath, nausea, or diaphoresis. She also denies her pain being exacerbated when she takes a deep breath. The patient had Covid 7 days ago, but she denies having any current sick symptoms. 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 She is prescribed nitroglycerine for this history and has been taking it for her current pain which has reportedly provided some temporary alleviation. Also reports taking Tazidime, gabapentin, and Tylenol for her pain. Patient adds a medical history of hyperlipidemia, POTS, Annetat-Danlos syndrome, diabetes, and hypertension. She denies having a current cardiologist as her previous recently retired, and the patient reports currently trying to establish care with a new one. The patient also notes that she is currently grief stricken which may be associated with her symptoms.     ROS as per HPI.    PAST MEDICAL HISTORY  Past Medical History:   Diagnosis Date    Abdominal pain     Anginal syndrome     Random chest pain especially with tachycardia    Apnea, sleep     Arthritis     ASTHMA     when around smoke    Back pain      "Borderline personality disorder (HCC)     Chickenpox     Chronic UTI 09/18/2010    Daytime sleepiness     Dental disorder 03/08/2021    Infected tooth    Depression     Diabetes (HCC)     Diarrhea     Incontinece    Disorder of thyroid     Hashimoto's    Fatigue     Frequent headaches     Heart burn     History of falling     Inappropriate sinus tachycardia (HCC) 2016    Statusp post ablation by Dr. Kumar.    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    Urinary bladder disorder     Suprapubic cath. 2/8/22: Not anymore.     Urinary incontinence     Weakness     Wears glasses      SURGICAL HISTORY  Past Surgical History:   Procedure Laterality Date    LA CYSTOSCOPY,INSERT URETERAL STENT Right 2/12/2024    Procedure: CYSTOSCOPY, WITH RIGHT URETEROSCOPY, WITH LITHOTRIPSY, WITH INSERTION OF RIGHT URETERAL STENT;  Surgeon: Josafat Roberson M.D.;  Location: SURGERY [facility];  Service: Urology    LA CYSTO/URETERO/PYELOSCOPY, DX Right 2/12/2024    Procedure: URETEROSCOPY;  Surgeon: Josafat Roberson M.D.;  Location: [facility];  Service: Urology    LASERTRIPSY Right 2/12/2024    Procedure: LITHOTRIPSY, USING LASER;  Surgeon: Josafat Roberson M.D.;  Location: SURGERY [facility];  Service: Urology    INGUINAL HERNIA LAPAROSCOPIC Right 07/21/2023    Procedure: LAPAROSCOPIC RIGHT INGUINAL HERNIA REPAIR WITH MESH;  Surgeon: Joe Noyola, " M.D.;  [place];  Service: General    HERNIA REPAIR Right 07/21/2023    PB PERCUT FIX PBOX/NECK FEMUR FX Left 01/28/2023    Procedure: FIXATION, HIP, USING CANNULATED SCREW;  Surgeon: Noman Abdul M.D.;  Location[place];  Service: Orthopedics    HI LAP,DIAGNOSTIC ABDOMEN  02/14/2022    Procedure: LAPAROSCOPY;  Surgeon: Seamus Pisano M.D.;  Location: SURGERY SAME DAY [facility];  Service: Gynecology    OVARIAN CYSTECTOMY Right 02/14/2022    Procedure: EXCISION, CYST, OVARY;  Surgeon: Seamus Pisano M.D.;  Location: SURGERY SAME DAY [facility];  Service: Gynecology    BOWEL STIMULATOR INSERTION  03/10/2021    Procedure: INSERTION, ELECTRODE LEADS AND PULSE GENERATOR, NEUROSTIMULATOR, SACRAL - REMOVAL OF INTERSTIM WITH REPLACEMENT OF SACRAL NEUROMODULATION DEVICE;  Surgeon: Joe Noyola M.D.;  Location[place];  Service: General    MUSCLE BIOPSY Right 01/26/2017    Procedure: MUSCLE BIOPSY - THIGH;  Surgeon: Isidro Vigil M.D.;  Location: [facility];  Service:     GASTROSCOPY WITH BALLOON DILATATION N/A 01/04/2017    Procedure: GASTROSCOPY WITH DILATATION;  Surgeon: Torres Vargas M.D.;  Location: [facility];  Service:     BOWEL STIMULATOR INSERTION  07/13/2016    Procedure: BOWEL STIMULATOR INSERTION FOR PERMANENT INTERSTIM SACRAL IMPLANT;  Surgeon: Joe Noyola M.D.;  Location: [facility];  Service:     RECOVERY  01/27/2016    Procedure: CATH LAB EP STUDY WITH SINUS NODE MODIFICATION ABHINAV;  Surgeon: Recoveryonly Surgery;  Location: SURGERY PRE-POST PROC UNIT [facility];  Service:     OTHER CARDIAC SURGERY  01/2016    cardiac ablation    NEURO DEST FACET L/S W/IG SNGL  04/21/2015    Performed by Reza Tabor at [facility]    LUMBAR FUSION ANTERIOR  08/21/2012    Performed by SUSIE SAWANT at [facility]    ALYSSA BY LAPAROSCOPY  08/29/2010    Performed by SHAYY JOHNS at [facility]     LAMINOTOMY      OTHER ABDOMINAL SURGERY      TONSILLECTOMY      tonsillectomy     FAMILY HISTORY  Family History   Problem Relation Age of Onset    Hypertension Mother     Sleep Apnea Mother     Heart Disease Mother     Other Mother         hypothryod    No Known Problems Sister     Other Sister         Narcolepsy;fibromyalsia;bone;nerve    Genitourinary () Problems Sister         endometriosis    Hypertension Maternal Uncle     Heart Disease Maternal Grandmother     Hypertension Maternal Grandmother     Cancer Neg Hx      SOCIAL HISTORY   reports that she has never smoked. She has never used smokeless tobacco. She reports that she does not drink alcohol and does not use drugs.    CURRENT MEDICATIONS  Discharge Medication List as of 3/31/2025  6:14 PM        CONTINUE these medications which have NOT CHANGED    Details   benzonatate (TESSALON) 100 MG Cap Take 1 Capsule by mouth 3 times a day as needed for Cough., Disp-60 Capsule, R-0, Normal      dicyclomine (BENTYL) 10 MG Cap Take 1 Capsule by mouth 2 times a day as needed for abdominal cramping/discomfort, Disp-60 Capsule, R-1, Normal      clotrimazole-betamethasone (LOTRISONE) 1-0.05 % Cream apply twice a day to the affected area for 10 days, Disp-15 g, R-1, Normal      Ranolazine 1000 MG TABLET SR 12 HR Take 1 Tablet by mouth 2 times a day., Disp-180 Tablet, R-3, Normal      isosorbide mononitrate SR (IMDUR) 30 MG TABLET SR 24 HR Take 1 Tablet by mouth every morning., Disp-90 Tablet, R-3, Normal      metoprolol SR (TOPROL XL) 50 MG TABLET SR 24 HR Take 1 Tablet by mouth 2 times a day.This a new dose as of 9/4/2024.Disp-180 Tablet, R-3, Normal      aspirin 81 MG EC tablet Take 1 Tablet by mouth every day., Disp-100 Tablet, R-3, Normal      docusate sodium (COLACE) 250 MG capsule Take 250 mg by mouth 2 times a day., Historical Med      nitroglycerin (NITROSTAT) 0.4 MG SL Tab Place 1 Tablet under the tongue as needed for Chest Pain (may repeat for chest pain  every 5 mins not to exceed 3 doses)., Disp-25 Tablet, R-11, Normal      spironolactone (ALDACTONE) 50 MG Tab Take 1 Tablet by mouth every day., Disp-90 Tablet, R-3, Normal      omeprazole (PRILOSEC) 20 MG delayed-release capsule Take 2 Capsules by mouth 2 times a day., Disp-180 Capsule, R-3, Normal      topiramate (TOPAMAX) 50 MG tablet Take 1 Tablet by mouth 2 times a day.THIS IS A 90 DAY SUPPLYDisp-180 Tablet, R-4, Normal      scopolamine (TRANSDERM SCOP, 1.5 MG,) 1 mg/72hr PATCH 72 HR Place 1 Patch on the skin every 72 hours., Disp-7 Patch, R-1, Normal      ziprasidone (GEODON) 40 MG Cap Take 1 capsule by mouth at bedtime., Disp-90 Capsule, R-1, Normal      amLODIPine (NORVASC) 5 MG Tab Take 1 tablet by mouth every day., Disp-90 Tablet, R-1, Normal      Galcanezumab-gnlm (EMGALITY) 120 MG/ML Solution Auto-injector Inject 1 mL subcutaneously every month., Disp-1 mL, R-11, Normal      lamoTRIgine (LAMICTAL) 25 MG Tab Take 2 Tablets by mouth 2 times a day., Disp-360 Tablet, R-1, Normal      Rimegepant Sulfate (NURTEC) 75 MG TABLET DISPERSIBLE Take 1 tablet by mouth at onset of migraine or aura okay to repeat in 24 hours if needed., Disp-8 Tablet, R-5, Normal      sumatriptan (IMITREX) 20 MG/ACT nasal spray Give 1 dose at onset headache okay to repeat in 2 hours if needed for max of 2 doses in a 24 hour timeframe., Disp-6 Each, R-5, Normal      ivabradine (CORLANOR) 7.5 MG Tab tablet Take 1 Tablet by mouth 2 times a day with meals.holdDisp-60 Tablet, R-3, Normal      diphenhydrAMINE (BENADRYL) 25 MG Tab Take 50 mg by mouth at bedtime., Historical Med      Melatonin 10 MG Tab Take 10 mg by mouth at bedtime., Historical Med           ALLERGIES  Cefdinir, Depakote [divalproex sodium], Doxycycline, Montelukast [singulair], Vancomycin, Wound dressing adhesive, Amitriptyline, Amoxicillin, Aripiprazole [abilify], Clindamycin, Erythromycin, Flomax [tamsulosin hydrochloride], Hydromorphone, Levaquin, Metformin, Sulfa drugs,  "Tamsulosin, Tape, Sulfamethoxazole w-trimethoprim, Ciprofloxacin, Keflex, Levofloxacin, and Metronidazole    PHYSICAL EXAM  BP (!) 162/97   Pulse 79   Temp 36.6 °C (97.9 °F) (Temporal)   Resp 18   Ht 1.626 m (5' 4\")   Wt 118 kg (261 lb 3.9 oz)   SpO2 97%   Breastfeeding No   BMI 44.84 kg/m²     General: No acute distress. BMI greater than 30.   HENT: Normocephalic, Mucus membranes are moist.   Chest: Lungs have even and unlabored respirations  Cardiovascular: No peripheral cyanosis.  Abdomen: Non distended.  Neuro: Awake, Conversive, Able to relay recent events.  Psychiatric: Calm and cooperative.     INITIAL ASSESSMENT  The patient has a history of diabetes, high cholesterol, hypertension, and vasospasms of the heart. She recently had Covid and now complains of constant chest pain to the left. Will evaluate for a heart injury, pneumonia, and pulmonary embolism.     ED Observation Status? No; Patient does not meet criteria for ED Observation.     DIAGNOSTIC STUDIES    Labs:   Results for orders placed or performed during the hospital encounter of 03/31/25   CBC WITH DIFFERENTIAL    Collection Time: 03/31/25  3:21 PM   Result Value Ref Range    WBC 5.6 4.8 - 10.8 K/uL    RBC 4.54 4.20 - 5.40 M/uL    Hemoglobin 14.6 12.0 - 16.0 g/dL    Hematocrit 42.2 37.0 - 47.0 %    MCV 93.0 81.4 - 97.8 fL    MCH 32.2 27.0 - 33.0 pg    MCHC 34.6 32.2 - 35.5 g/dL    RDW 42.0 35.9 - 50.0 fL    Platelet Count 180 164 - 446 K/uL    MPV 11.6 9.0 - 12.9 fL    Neutrophils-Polys 56.90 44.00 - 72.00 %    Lymphocytes 30.90 22.00 - 41.00 %    Monocytes 9.10 0.00 - 13.40 %    Eosinophils 2.10 0.00 - 6.90 %    Basophils 0.50 0.00 - 1.80 %    Immature Granulocytes 0.50 0.00 - 0.90 %    Nucleated RBC 0.00 0.00 - 0.20 /100 WBC    Neutrophils (Absolute) 3.18 1.82 - 7.42 K/uL    Lymphs (Absolute) 1.73 1.00 - 4.80 K/uL    Monos (Absolute) 0.51 0.00 - 0.85 K/uL    Eos (Absolute) 0.12 0.00 - 0.51 K/uL    Baso (Absolute) 0.03 0.00 - 0.12 K/uL    " Immature Granulocytes (abs) 0.03 0.00 - 0.11 K/uL    NRBC (Absolute) 0.00 K/uL   COMP METABOLIC PANEL    Collection Time: 25  3:21 PM   Result Value Ref Range    Sodium 137 135 - 145 mmol/L    Potassium 4.0 3.6 - 5.5 mmol/L    Chloride 106 96 - 112 mmol/L    Co2 21 20 - 33 mmol/L    Anion Gap 10.0 7.0 - 16.0    Glucose 146 (H) 65 - 99 mg/dL    Bun 14 8 - 22 mg/dL    Creatinine 0.95 0.50 - 1.40 mg/dL    Calcium 9.7 8.5 - 10.5 mg/dL    Correct Calcium 9.2 8.5 - 10.5 mg/dL    AST(SGOT) 15 12 - 45 U/L    ALT(SGPT) 31 2 - 50 U/L    Alkaline Phosphatase 77 30 - 99 U/L    Total Bilirubin 0.3 0.1 - 1.5 mg/dL    Albumin 4.6 3.2 - 4.9 g/dL    Total Protein 6.7 6.0 - 8.2 g/dL    Globulin 2.1 1.9 - 3.5 g/dL    A-G Ratio 2.2 g/dL   LIPASE    Collection Time: 25  3:21 PM   Result Value Ref Range    Lipase 42 11 - 82 U/L   TROPONIN    Collection Time: 25  3:21 PM   Result Value Ref Range    Troponin T 8 6 - 19 ng/L   HCG QUAL SERUM    Collection Time: 25  3:21 PM   Result Value Ref Range    Beta-Hcg Qualitative Serum Negative Negative   ESTIMATED GFR    Collection Time: 25  3:21 PM   Result Value Ref Range    GFR (CKD-EPI) 80 >60 mL/min/1.73 m 2   EKG (NOW)    Collection Time: 25  6:09 PM   Result Value Ref Range    Report       [facility] Emergency Dept.    Test Date:  2025  Pt Name:    HARLEY DE LA CRUZ              Department: [facility]  MRN:        [id number]                      Room:  Gender:     Female                       Technician: [id number]  :        [id number]                   Requested By:ER TRIAGE PROTOCOL  Order #:    [id number]                    Reading MD: LC GOMEZ D.O.    Measurements  Intervals                                Axis  Rate:       78                           P:          52  NM:         144                          QRS:        10  QRSD:       94                           T:          44  QT:         410  QTc:         468    Interpretive Statements  Sinus rhythm  Compared to ECG 03/26/2025 10:54:52  No significant changes  Electronically Signed On 03- 18:09:23 PDT by LC GOMEZ D.O.       *Note: Due to a large number of results and/or encounters for the requested time period, some results have not been displayed. A complete set of results can be found in Results Review.     EKG:   I have independently interpreted the above EKG.    Radiology:   The attending emergency physician has independently interpreted the diagnostic imaging associated with this visit and am waiting the final reading from the radiologist.   Preliminary interpretation is as follows: No pneumonia  Radiologist interpretation:   CT-CTA CHEST PULMONARY ARTERY W/ RECONS   Final Result         1.  No evidence of pulmonary embolism.   2.  New 6 mm groundglass right upper lobe nodule. Follow-up per Fleischner criteria as clinically indicated.   3.  Splenomegaly.      Fleischner Society pulmonary nodule recommendations:      Ground Glass and Part Solid: No routine follow-up      Comments: In certain suspicious nodules less than 6 mm, consider follow-up at 2 and 4 years. If solid component(s) or growth develops, consider resection.      Note: These recommendations do not apply to lung cancer screening, patients with immunosuppression, or patients with known primary cancer.      Fleischner Society 2017 Guidelines for Management of Incidentally Detected Pulmonary Nodules in Adults        COURSE & MEDICAL DECISION MAKING     COURSE AND PLAN    2:19 PM - An EKG was ordered in triage by nursing staff as per protocol.     3:04 PM - Patient seen and examined at bedside. Discussed plan of care, including labs and imaging to evaluate the patient's heart function. Patient agrees to the plan of care. The patient will be medicated with Morphine 4 mg injection and Zofran 4 mg injection. Ordered for CT-CTA Chest Pulmonary Artery w/Recons, EKG, HCG Qual, Troponin,  Lipase, CMP, CBC w/Diff to evaluate her symptoms.     6:16 PM - I reevaluated the patient at bedside. I discussed the patient's diagnostic study results which are outlined above. The patient will be provided with Medrol Dosepak prescription and Cardiology referral. I discussed plan for discharge and follow up as outlined below. The patient is stable for discharge at this time and will return for any new or worsening symptoms. Patient verbalizes understanding and support with my plan for discharge.     ED Summary: The patient has a history of a cardiac vasospasms and takes nitroglycerin and nitroglycerin has not been helping her pain.  She recently had COVID so CT was done there is no pleural effusion, no PE, and no pericarditis.  Her cardiac evaluation is negative she is stable for discharge home.    Decision tools and prescription drugs considered including, but not limited to:     CHEST PAIN:   HEART Score for Major Cardiac Events  HEART Score     History: Slightly suspicious  ECG: Normal  Age: <45  Risk Factors: >2 risk factors or hx of atherosclerotic disease  Troponin: Less than or equal to normal limit    Heart Score: 2    Total Score   0-3 Points = Low Score, risk of MACE 0.9-1.7%.  4-6 Points = Moderate Score, risk of MACE 12-16.6%  7-10 Points = High Score, risk of MACE 50-65%    DISPOSITION AND DISCUSSIONS  I have discussed management of the patient with the following physicians and ARIES's: None    Discussion of management with other QHP or appropriate source(s): None    Barriers to care at this time, including but not limited to: None     The patient will return for new or worsening symptoms and is stable at the time of discharge.    DISPOSITION:  Patient will be discharged home in stable condition.    FOLLOW UP:  Fly Goodson M.D.  [address] [place] [address] [address]  [place] [id number]  [phone]    In 1 week    [facility]  [address]  [place]  [id number]  [phone]    OUTPATIENT MEDICATIONS:  Discharge Medication List as of 3/31/2025  6:14 PM        START taking these medications    Details   methylPREDNISolone (MEDROL DOSEPAK) 4 MG Tablet Therapy Pack Use as directed, Disp-1 Each, R-0, Normal      gabapentin (NEURONTIN) 400 MG Cap TAKE 3 CAPSULES BY MOUTH THREE TIMES DAILY.  FOR 30 DAY SUPPLY. DX: M79.7, Disp-270 Capsule, R-0, Normal      tizanidine (ZANAFLEX) 4 MG Tab Take 1 Tablet by mouth Three Times A Day as needed QTY 90 for 30 days  Dx: M79.18, Disp-90 Tablet, R-0, Normal           FINAL DIAGNOSIS  1. Precordial chest pain      IChino (Scribe), am scribing for, and in the presence of, Vikram Dunlap D.O..    Electronically signed by: Chino Tello (Scribe), 3/31/2025    IVikram D.O. personally performed the services described in this documentation, as scribed by Chino Tello in my presence, and it is both accurate and complete.     The note accurately reflects work and decisions made by me.  Vikram Dunlap D.O.  3/31/2025  10:01 PM

## 2025-04-01 ENCOUNTER — PHARMACY VISIT (OUTPATIENT)
Dept: PHARMACY | Facility: MEDICAL CENTER | Age: 35
End: 2025-04-01
Payer: COMMERCIAL

## 2025-04-01 ENCOUNTER — HOSPITAL ENCOUNTER (OUTPATIENT)
Facility: MEDICAL CENTER | Age: 35
End: 2025-04-01
Payer: MEDICARE

## 2025-04-01 ENCOUNTER — APPOINTMENT (OUTPATIENT)
Dept: CARDIOLOGY | Facility: MEDICAL CENTER | Age: 35
End: 2025-04-01
Payer: MEDICARE

## 2025-04-01 LAB
C PARVUM AG STL QL IA.RAPID: NEGATIVE
G LAMBLIA AG STL QL IA.RAPID: NEGATIVE
H PYLORI AG STL QL IA: NOT DETECTED
LACTOFERRIN STL QL IA: POSITIVE

## 2025-04-01 PROCEDURE — 82653 EL-1 FECAL QUANTITATIVE: CPT

## 2025-04-01 PROCEDURE — 87209 SMEAR COMPLEX STAIN: CPT

## 2025-04-01 PROCEDURE — 87338 HPYLORI STOOL AG IA: CPT

## 2025-04-01 PROCEDURE — 83993 ASSAY FOR CALPROTECTIN FECAL: CPT

## 2025-04-01 PROCEDURE — 87177 OVA AND PARASITES SMEARS: CPT

## 2025-04-01 PROCEDURE — 83630 LACTOFERRIN FECAL (QUAL): CPT

## 2025-04-01 PROCEDURE — 87328 CRYPTOSPORIDIUM AG IA: CPT

## 2025-04-01 PROCEDURE — 87329 GIARDIA AG IA: CPT

## 2025-04-01 NOTE — ED NOTES
Discharge instructions provided.  Pt verbalized the understanding of discharge instructions to follow up with Cardiology/PCP and to return to ER if condition worsens.  Pt ambulated out of ER without difficulty.   States she is taking an uber home.

## 2025-04-01 NOTE — ED NOTES
ERP at bedside. Pt agrees with plan of care discussed by ERP. CIDET acknowledged with patient. Ebonie in low position, side rail up for pt safety. Call light within reach. Will continue to monitor.

## 2025-04-01 NOTE — PROGRESS NOTES
Pts IV infiltrated upon contrast injection. Approx 40cc in left AC. IV was removed and a new IV was placed via US by SONYA Loera in the right AC. IV worked well the second attempt. Pt given heat packs and care instructions. RN was informed of infiltration and midas was placed.

## 2025-04-01 NOTE — DISCHARGE INSTRUCTIONS
Cardiac evaluation, and chest CT are showing no significant concerns.  There is a groundglass appearance area of the lung which is consistent with recent COVID, this just requires monitoring and follow-up with your primary doctor.    There is no concerns for heart or lung disease at this time.  Referral is being placed for cardiology so that you can get follow-up.  Please call the number provided above.

## 2025-04-02 ENCOUNTER — TELEPHONE (OUTPATIENT)
Dept: HEALTH INFORMATION MANAGEMENT | Facility: OTHER | Age: 35
End: 2025-04-02
Payer: MEDICARE

## 2025-04-02 ENCOUNTER — PHARMACY VISIT (OUTPATIENT)
Dept: PHARMACY | Facility: MEDICAL CENTER | Age: 35
End: 2025-04-02
Payer: COMMERCIAL

## 2025-04-03 LAB
CALPROTECTIN STL-MCNT: 105 UG/G
ELASTASE PANC STL-MCNT: 445 UG/G

## 2025-04-04 ENCOUNTER — OFFICE VISIT (OUTPATIENT)
Dept: CARDIOLOGY | Facility: MEDICAL CENTER | Age: 35
End: 2025-04-04
Payer: MEDICARE

## 2025-04-04 VITALS
DIASTOLIC BLOOD PRESSURE: 74 MMHG | HEART RATE: 77 BPM | BODY MASS INDEX: 44.05 KG/M2 | HEIGHT: 64 IN | WEIGHT: 258 LBS | SYSTOLIC BLOOD PRESSURE: 122 MMHG | OXYGEN SATURATION: 97 % | RESPIRATION RATE: 12 BRPM

## 2025-04-04 DIAGNOSIS — I47.11 INAPPROPRIATE SINUS TACHYCARDIA (HCC): ICD-10-CM

## 2025-04-04 DIAGNOSIS — Z78.9 FALSE POSITIVE CARDIAC STRESS TEST: ICD-10-CM

## 2025-04-04 DIAGNOSIS — R07.89 OTHER CHEST PAIN: ICD-10-CM

## 2025-04-04 DIAGNOSIS — G90.A POSTURAL ORTHOSTATIC TACHYCARDIA SYNDROME: ICD-10-CM

## 2025-04-04 PROCEDURE — 99214 OFFICE O/P EST MOD 30 MIN: CPT

## 2025-04-04 PROCEDURE — 99213 OFFICE O/P EST LOW 20 MIN: CPT

## 2025-04-04 ASSESSMENT — ENCOUNTER SYMPTOMS
SYNCOPE: 0
DIZZINESS: 0
DYSPNEA ON EXERTION: 0
SHORTNESS OF BREATH: 0
PND: 0
PALPITATIONS: 0
ORTHOPNEA: 0
HEADACHES: 0
LIGHT-HEADEDNESS: 0
NEAR-SYNCOPE: 0

## 2025-04-04 ASSESSMENT — FIBROSIS 4 INDEX: FIB4 SCORE: 0.52

## 2025-04-04 NOTE — PROGRESS NOTES
Chief Complaint   Patient presents with    Tachycardia     F/V DX:   Inappropriate sinus tachycardia (HCC)    Atrial Fibrillation     F/V DX:   Paroxysmal atrial fibrillation (HCC)          Subjective:   Kristin Balderrama is a 33 y.o. female who presents today for follow-up.     Patient of Dr. Leon.  Current medical problems include sinus tachycardia, ablation 2016 for sinus node modification for IST (Inappropriate Sinus Tachycardia), IST ablation 2016, POTS on chronic Corlanor and beta-blocker therapy, hypermobility syndrome, TONYA, IIH (idiopathic intracranial hypertension), NPH normal pressure hydrocephalus, optic neuropathy, chronic pain syndrome, bladder incontinence, S/P sacral stimulator, cervical neuralgia with leg weakness, nephrolithiasis S/P kidney stone extraction stent placement 2/14/2024, hip fracture 1/27/2023 H/O psychiatric issues. Their last clinic visit was 3/5/2025 with myself.    At that last follow up patient has had multiple ED visit for continued chest pain. During her Ed workup her troponin and cardiac markers are negative. A stress test was ordered due to risk and continued chest pain. It did show apical wall, apical septum ischemia, summed stress difference score is 6.     Patient presented to the ED on 2/15/2025 for chest pain. After consultation with cardiology she did agree to move forward with cardiac catheterization. Patient underwent LHC with Dr. Bryant on 2/17/2025 which did not show any obstructive CAD. Recommendation for an outpatient cardiac CT for assessment of microvascular disease.    Patient has had two more ED visit for chest pain. Troponin negative and EKG negative for any ischemic changes.     Today's visit:  Patient reports that she has had increased episodes of chest pain which seem to be associated with increased stress. Her half sister recently passed away suddenly. She said the medications appeared to be helping the chest pain until this occurred. Nitroglycerin  "does help symptoms.  She does experience dizziness and near syncope with the chest pain. She denies shortness of breath, edema, PND, orthopnea, or syncope. She denies any elevated blood pressures. She is taking her current medication as prescribed.       Cardiovascular Risk Factors:  1. Smoking status: Never  2. Type II Diabetes Mellitus: No   Lab Results   Component Value Date/Time    HBA1C 5.8 02/18/2025 05:02 PM    HBA1C 6.0 (A) 09/23/2024 12:11 PM     3. Hypertension: Yes  4. Dyslipidemia: No   Cholesterol,Tot   Date Value Ref Range Status   01/20/2021 172 100 - 199 mg/dL Final     LDL   Date Value Ref Range Status   01/20/2021 98 <100 mg/dL Final     HDL   Date Value Ref Range Status   01/20/2021 48 >=40 mg/dL Final     Triglycerides   Date Value Ref Range Status   01/20/2021 130 0 - 149 mg/dL Final       Past Medical History:   Diagnosis Date    Abdominal pain     Anginal syndrome     Random chest pain especially with tachycardia    Apnea, sleep     Arthritis     ASTHMA     when around smoke    Back pain     Borderline personality disorder (HCC)     Chickenpox     Chronic UTI 09/18/2010    Daytime sleepiness     Dental disorder 03/08/2021    Infected tooth    Depression     Diabetes (HCC)     Diarrhea     Incontinece    Disorder of thyroid     Hashimoto's    Fatigue     Frequent headaches     Heart burn     History of falling     Inappropriate sinus tachycardia (HCC) 2016    Statusp post ablation by Dr. Kumar.    Migraine     Mitochondrial disease (HCC)     Multiple personality disorder (HCC)     Obesity     Pain     Back, 7/10    Painful joint     PCOS (polycystic ovarian syndrome)     Pneumonia 2012 12/2020    POTS (postural orthostatic tachycardia syndrome)     Psychosis (HCC)     Ringing in ears     Scoliosis     Shortness of breath     O2 as needed    Sinus tachycardia 10/31/2013    Sleep apnea     CPAP \"pulmonary doctor took me off mid year 2016\"    Snoring     Supraventricular tachycardia (HCC) " "04/10/2019    Transverse myelitis (HCC)     2/8/22: Per pt: not anymore    Tuberculosis     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    Urinary bladder disorder     Suprapubic cath. 2/8/22: Not anymore.     Urinary incontinence     Weakness     Wears glasses          Family History   Problem Relation Age of Onset    Hypertension Mother     Sleep Apnea Mother     Heart Disease Mother     Other Mother         hypothryod    No Known Problems Sister     Other Sister         Narcolepsy;fibromyalsia;bone;nerve    Genitourinary () Problems Sister         endometriosis    Hypertension Maternal Uncle     Heart Disease Maternal Grandmother     Hypertension Maternal Grandmother     Cancer Neg Hx          Social History     Tobacco Use    Smoking status: Never    Smokeless tobacco: Never   Vaping Use    Vaping status: Never Used   Substance Use Topics    Alcohol use: No     Alcohol/week: 0.0 oz    Drug use: Never         Allergies   Allergen Reactions    Cefdinir Shortness of Breath and Itching     Tolerated 1/18/17  Tolerates ceftriaxone  Tolerated augmentin 8/2019     Depakote [Divalproex Sodium] Unspecified     Muscle spasms/muscle pain and weakness      Doxycycline Anaphylaxis and Vomiting     Other reaction(s): pustules/blisters  Other reaction(s): stomach pain    Montelukast [Singulair] Unspecified     Cardiac effusion    Vancomycin Itching     Pt becomes flushed in face and chest.   RXN=7/10/16    Wound Dressing Adhesive Rash     By pt report-\"removes skin totally off\"    Amitriptyline Unspecified     Headaches      Amoxicillin Rash      Tolerates augmentin    Aripiprazole [Abilify] Unspecified     Headaches/muscle twitching      Clindamycin Nausea         Other reaction(s): nausea stomach pain    Erythromycin Rash     .  Other reaction(s): nausea stomach pain    Flomax [Tamsulosin Hydrochloride] Swelling    Hydromorphone      Other reaction(s): vomiting    Levaquin Unspecified     Severe muscle cramps in " "legs  RXN=unknown  Other reaction(s): leg muscle cramps    Metformin Unspecified     Increased lactic acid     Other reaction(s): itching and rash/nausea vomiting    Sulfa Drugs Hives, Itching, Myalgia and Unspecified     Muscle pain and weakness    Other reaction(s): unknown    Tamsulosin Swelling     Swelling of legs    Tape Rash     Tears skin off  coban with Tegaderm tape ok intermittently  RXN=ongoing    Sulfamethoxazole W-Trimethoprim Rash    Ciprofloxacin Rash          Keflex Rash     Pt states she gets a rash with this medication  Tolerates ceftriaxone  Can take with Benadryl    Levofloxacin Unspecified     Leg muscle cramps    Metronidazole Rash     \"Vision problems\"  Other reaction(s): vision problems         Current Outpatient Medications   Medication Sig    gabapentin (NEURONTIN) 400 MG Cap TAKE 3 CAPSULES BY MOUTH THREE TIMES DAILY.  FOR 30 DAY SUPPLY. DX: M79.7    methylPREDNISolone (MEDROL DOSEPAK) 4 MG Tablet Therapy Pack Use as directed on packaging    dicyclomine (BENTYL) 10 MG Cap Take 1 Capsule by mouth 2 times a day as needed for abdominal cramping/discomfort    Ranolazine 1000 MG TABLET SR 12 HR Take 1 Tablet by mouth 2 times a day.    isosorbide mononitrate SR (IMDUR) 30 MG TABLET SR 24 HR Take 1 Tablet by mouth every morning.    metoprolol SR (TOPROL XL) 50 MG TABLET SR 24 HR Take 1 Tablet by mouth 2 times a day.    aspirin 81 MG EC tablet Take 1 Tablet by mouth every day.    docusate sodium (COLACE) 250 MG capsule Take 250 mg by mouth 2 times a day.    nitroglycerin (NITROSTAT) 0.4 MG SL Tab Place 1 Tablet under the tongue as needed for Chest Pain (may repeat for chest pain every 5 mins not to exceed 3 doses).    spironolactone (ALDACTONE) 50 MG Tab Take 1 Tablet by mouth every day.    omeprazole (PRILOSEC) 20 MG delayed-release capsule Take 2 Capsules by mouth 2 times a day.    topiramate (TOPAMAX) 50 MG tablet Take 1 Tablet by mouth 2 times a day.    scopolamine (TRANSDERM SCOP, 1.5 " "MG,) 1 mg/72hr PATCH 72 HR Place 1 Patch on the skin every 72 hours.    ziprasidone (GEODON) 40 MG Cap Take 1 capsule by mouth at bedtime.    amLODIPine (NORVASC) 5 MG Tab Take 1 tablet by mouth every day.    Galcanezumab-gnlm (EMGALITY) 120 MG/ML Solution Auto-injector Inject 1 mL subcutaneously every month.    lamoTRIgine (LAMICTAL) 25 MG Tab Take 2 Tablets by mouth 2 times a day.    Rimegepant Sulfate (NURTEC) 75 MG TABLET DISPERSIBLE Take 1 tablet by mouth at onset of migraine or aura okay to repeat in 24 hours if needed.    sumatriptan (IMITREX) 20 MG/ACT nasal spray Give 1 dose at onset headache okay to repeat in 2 hours if needed for max of 2 doses in a 24 hour timeframe.    ivabradine (CORLANOR) 7.5 MG Tab tablet Take 1 Tablet by mouth 2 times a day with meals.    diphenhydrAMINE (BENADRYL) 25 MG Tab Take 50 mg by mouth at bedtime.    Melatonin 10 MG Tab Take 10 mg by mouth at bedtime.         Review of Systems   Constitutional: Positive for malaise/fatigue.   Cardiovascular:  Positive for chest pain. Negative for dyspnea on exertion, leg swelling, near-syncope, orthopnea, palpitations, paroxysmal nocturnal dyspnea and syncope.   Respiratory:  Negative for shortness of breath.    Neurological:  Negative for dizziness, headaches and light-headedness.           Objective:   /74 (BP Location: Left arm, Patient Position: Sitting, BP Cuff Size: Adult)   Pulse 77   Resp 12   Ht 1.626 m (5' 4\")   Wt 117 kg (258 lb)   SpO2 97%  Body mass index is 44.29 kg/m².         Physical Exam  Vitals reviewed.   Constitutional:       General: She is not in acute distress.     Appearance: She is obese.   HENT:      Head: Normocephalic and atraumatic.   Cardiovascular:      Rate and Rhythm: Normal rate and regular rhythm.      Pulses: Normal pulses.      Heart sounds: No murmur heard.  Pulmonary:      Effort: Pulmonary effort is normal. No respiratory distress.      Breath sounds: Normal breath sounds. "   Musculoskeletal:      Right lower leg: No edema.      Left lower leg: No edema.   Neurological:      Mental Status: She is alert and oriented to person, place, and time.      Gait: Gait normal.   Psychiatric:         Behavior: Behavior normal.             Lab Results   Component Value Date/Time    SODIUM 137 03/31/2025 03:21 PM    POTASSIUM 4.0 03/31/2025 03:21 PM    CHLORIDE 106 03/31/2025 03:21 PM    CO2 21 03/31/2025 03:21 PM    GLUCOSE 146 (H) 03/31/2025 03:21 PM    BUN 14 03/31/2025 03:21 PM    CREATININE 0.95 03/31/2025 03:21 PM    CREATININE 0.75 (L) 07/20/2010 11:00 AM    BUNCREATRAT 20.0 01/22/2025 05:10 PM    BUNCREATRAT 19 07/20/2010 11:00 AM    GLOMRATE >59 07/20/2010 11:00 AM      Lab Results   Component Value Date/Time    WBC 5.6 03/31/2025 03:21 PM    WBC 6.1 07/20/2010 11:00 AM    RBC 4.54 03/31/2025 03:21 PM    RBC 4.38 07/20/2010 11:00 AM    HEMOGLOBIN 14.6 03/31/2025 03:21 PM    HEMATOCRIT 42.2 03/31/2025 03:21 PM    MCV 93.0 03/31/2025 03:21 PM    MCV 93 07/20/2010 11:00 AM    MCH 32.2 03/31/2025 03:21 PM    MCH 30.1 07/20/2010 11:00 AM    MCHC 34.6 03/31/2025 03:21 PM    MPV 11.6 03/31/2025 03:21 PM    NEUTSPOLYS 56.90 03/31/2025 03:21 PM    LYMPHOCYTES 30.90 03/31/2025 03:21 PM    MONOCYTES 9.10 03/31/2025 03:21 PM    EOSINOPHILS 2.10 03/31/2025 03:21 PM    BASOPHILS 0.50 03/31/2025 03:21 PM    ANISOCYTOSIS 1+ 10/05/2024 04:35 AM      Lab Results   Component Value Date/Time    CHOLSTRLTOT 198 09/30/2024 07:14 AM     (H) 09/30/2024 07:14 AM    HDL 44 09/30/2024 07:14 AM    TRIGLYCERIDE 175 (H) 09/30/2024 07:14 AM       Lab Results   Component Value Date/Time    TROPONINT 7 07/17/2024 1913     Lab Results   Component Value Date/Time    NTPROBNP 70 07/17/2024 1913     Coronary Angiogram 2/17/2025  HEMODYNAMICS:   Aortic pressure: 112/79 mmHg  Pre-A wave pressure: 9 mmHg  No significant aortic gradient on pullback     CORONARY ANGIOGRAPHY:  The left main coronary artery is patent and  bifurcates into the left anterior descending and left circumflex coronaries.  The left anterior descending coronary artery is a large, transapical vessel that supplies a moderate first diagonal branch and a small second diagonal branch and has no angiographically significant disease.  The left circumflex coronary artery is a large, nondominant vessel that supplies a small first obtuse marginal branch, a large and bifurcating second obtuse marginal branch, and a small third obtuse marginal branch and has no angiographically significant disease.  The right coronary artery is a large, dominant vessel that supplies a large posterior descending coronary and a large posterolateral branch and has no angiographically significant disease.     IMPRESSION:  Angiographically normal coronary arteries.  Normal left heart filling pressures.     RECOMMENDATIONS:  Return to floor with continued care per primary service.  TR band release per protocol.  Continue evaluation for chronic chest pain symptoms not due to obstructive epicardial coronary artery disease.      Cardiac PET 3/12/2025   PET IMAGING INTERPRETATION      Large defect of mildly to moderately reduced uptake in the apex, apical    anterior, apical septal, apical inferior, mid anterior, mid anteroseptal, mid      inferoseptal, mid inferior segments which is more present in stress than rest      images suggestive of large scar and mild ischemia in the apical and mid    inferior segments.   Normal left ventricular wall motion.  LV ejection fraction = 73%.      FLOW RESERVE INTERPRETATION      Reduced coronary flow at rest with normal coronary flow reserve  Assessment:   1. Other chest pain    2. False positive cardiac stress test    3. Inappropriate sinus tachycardia (HCC)    4. Postural orthostatic tachycardia syndrome              Medical Decision Making:  Today's Assessment / Plan:   Chest pain  False positive nuclear medicine stress test  -Patient reports increased  chest pain with stress with family  -Discussed results of recent C which did not show any signs of coronary artery disease or blockages causing chest pain.   -Cardiac PET is abnormal and discussed that could be due to microvascular disease and recommend follow up with Dr. Smith next week to further discuss results and recommendation moving forward and also possible referral to tertiary care center for testing.   -Continue Ranolazine 1000 mg twice a day  -Continue metoprolol 50 mg twice a day  -Continue imdur 30 mg daily, discussed can trial increase dose but patient not sure she wants to increase this medication due to concern over side effects.  -Nitroglycerine 0.4 mg as needed for chest pain not to exceed three doses    POTS  Inappropriate sinus tachycardia  -Continue to monitor salt through diet  -Continue corlanor 7.5 mg twice a day  -Continue spironolactone 50 mg daily   -Continue metoprolol 50 mg twice a day  -Blood pressure well controlled, continue to monitor at home    Return in about 1 week (around 4/11/2025) for Dr. Smith to establish with new cardiologist.  Sooner if problems.    PER Flores.

## 2025-04-04 NOTE — LETTER
April 4, 2025    To Whom It May Concern:         This is confirmation that Kristin Balderrama attended her scheduled appointment with VANIA Flores on 4/04/25. Patient is cleared to return to work with light duty restrictions. She cannot lift greater than 25 lbs and needs to be allowed to take breaks as needed. Restriction will be revaluated at her next appointment.         If you have any questions please do not hesitate to call me at the phone number listed below.    Sincerely,          PER Flores.  [phone]

## 2025-04-05 ENCOUNTER — PHARMACY VISIT (OUTPATIENT)
Dept: PHARMACY | Facility: MEDICAL CENTER | Age: 35
End: 2025-04-05
Payer: COMMERCIAL

## 2025-04-05 ENCOUNTER — OFFICE VISIT (OUTPATIENT)
Dept: URGENT CARE | Facility: CLINIC | Age: 35
End: 2025-04-05
Payer: MEDICARE

## 2025-04-05 VITALS
BODY MASS INDEX: 44.05 KG/M2 | RESPIRATION RATE: 22 BRPM | HEART RATE: 70 BPM | TEMPERATURE: 97.4 F | OXYGEN SATURATION: 98 % | WEIGHT: 258 LBS | HEIGHT: 64 IN

## 2025-04-05 DIAGNOSIS — J32.9 BACTERIAL SINUSITIS: ICD-10-CM

## 2025-04-05 DIAGNOSIS — B96.89 BACTERIAL SINUSITIS: ICD-10-CM

## 2025-04-05 DIAGNOSIS — J45.21 MILD INTERMITTENT ASTHMA WITH EXACERBATION: ICD-10-CM

## 2025-04-05 PROCEDURE — 99214 OFFICE O/P EST MOD 30 MIN: CPT | Performed by: FAMILY MEDICINE

## 2025-04-05 PROCEDURE — RXMED WILLOW AMBULATORY MEDICATION CHARGE: Performed by: FAMILY MEDICINE

## 2025-04-05 PROCEDURE — RXOTC WILLOW AMBULATORY OTC CHARGE

## 2025-04-05 RX ORDER — AZITHROMYCIN 250 MG/1
TABLET, FILM COATED ORAL
Qty: 6 TABLET | Refills: 0 | Status: ON HOLD | OUTPATIENT
Start: 2025-04-05 | End: 2025-04-15

## 2025-04-05 ASSESSMENT — FIBROSIS 4 INDEX: FIB4 SCORE: 0.52

## 2025-04-05 NOTE — PROGRESS NOTES
"  Subjective:      34 y.o. female presents to urgent care for cold symptoms that started on just over a week ago but worsened on Thursday. She is experiencing headache, increased sinus pressure, nasal congestion, sore throat, and cough. No tobacco product use. She does have asthma for which she uses combivent and albuterol. Typically she uses her albuterol an average of once monthly, but since getting sick is using it at least 4 times daily. She is vaccinated against COVID. Several coworkers have recently been sick with similar symptoms.     She denies any other questions or concerns at this time.    Current problem list, medication, and past medical/surgical history were reviewed in Epic.    ROS  See HPI     Objective:      Pulse 70   Temp 36.3 °C (97.4 °F) (Temporal)   Resp (!) 22   Ht 1.626 m (5' 4\")   Wt 117 kg (258 lb)   SpO2 98%   BMI 44.29 kg/m²     Physical Exam  Constitutional:       General: She is not in acute distress.     Appearance: She is not diaphoretic.   HENT:      Right Ear: Tympanic membrane, ear canal and external ear normal.      Left Ear: Tympanic membrane, ear canal and external ear normal.      Nose:      Right Sinus: Maxillary sinus tenderness and frontal sinus tenderness present.      Left Sinus: Maxillary sinus tenderness and frontal sinus tenderness present.      Mouth/Throat:      Tongue: Tongue does not deviate from midline.      Palate: No lesions.      Pharynx: No oropharyngeal exudate or posterior oropharyngeal erythema.      Tonsils: No tonsillar exudate.   Cardiovascular:      Rate and Rhythm: Normal rate and regular rhythm.      Heart sounds: Normal heart sounds.   Pulmonary:      Effort: Pulmonary effort is normal. No respiratory distress.      Breath sounds: Normal breath sounds.   Neurological:      Mental Status: She is alert.   Psychiatric:         Mood and Affect: Affect normal.         Judgment: Judgment normal.       Assessment/Plan:     1. Bacterial sinusitis  2. " Mild intermittent asthma with exacerbation  Acute on chronic issue with asthma exacerbation.  Continue with albuterol and Combivent as prescribed.  Criteria for bacterial sinusitis has been met.  She is allergic to penicillins and doxycycline.  She is also allergic to azithromycin, but has tolerated azithromycin several times in the past.  Prescription for azithromycin has been sent.  She may continue with Tylenol, ibuprofen, and Flonase as needed for symptomatic relief.  - azithromycin (ZITHROMAX) 250 MG Tab; 2 tabs by mouth day 1, 1 tab by mouth days 2-5  Dispense: 6 Tablet; Refill: 0      Instructed to return to Urgent Care or nearest Emergency Department if symptoms fail to improve, for any change in condition, further concerns, or new concerning symptoms. Patient states understanding of the plan of care and discharge instructions.    Lela Valencia M.D.

## 2025-04-07 DIAGNOSIS — G90.A POSTURAL ORTHOSTATIC TACHYCARDIA SYNDROME: ICD-10-CM

## 2025-04-07 PROCEDURE — RXMED WILLOW AMBULATORY MEDICATION CHARGE: Performed by: NURSE PRACTITIONER

## 2025-04-07 NOTE — TELEPHONE ENCOUNTER
Is the patient due for a refill? Yes    Was the patient seen the last 15 months? Yes    Date of last office visit: 4/4/2025    Does the patient have an upcoming appointment?  Yes   If yes, When? 4/11/2025    Provider to refill:[facility]    Does the patient have snf Plus and need 100-day supply? (This applies to ALL medications) Patient does not have SCP

## 2025-04-08 ENCOUNTER — PHARMACY VISIT (OUTPATIENT)
Dept: PHARMACY | Facility: MEDICAL CENTER | Age: 35
End: 2025-04-08
Payer: COMMERCIAL

## 2025-04-08 LAB — OVA AND PARASITE, FECAL INTERPRETATION Q0595: NEGATIVE

## 2025-04-08 PROCEDURE — RXMED WILLOW AMBULATORY MEDICATION CHARGE

## 2025-04-08 RX ORDER — IVABRADINE 7.5 MG/1
7.5 TABLET, FILM COATED ORAL 2 TIMES DAILY WITH MEALS
Qty: 90 TABLET | Refills: 3 | Status: SHIPPED | OUTPATIENT
Start: 2025-04-08 | End: 2025-04-17 | Stop reason: SDUPTHER

## 2025-04-09 ENCOUNTER — HOSPITAL ENCOUNTER (OUTPATIENT)
Dept: RADIOLOGY | Facility: MEDICAL CENTER | Age: 35
End: 2025-04-09
Attending: ORTHOPAEDIC SURGERY
Payer: MEDICARE

## 2025-04-09 DIAGNOSIS — M25.362 PATELLAR INSTABILITY OF LEFT KNEE: ICD-10-CM

## 2025-04-09 PROCEDURE — 73721 MRI JNT OF LWR EXTRE W/O DYE: CPT | Mod: LT

## 2025-04-10 ENCOUNTER — HOSPITAL ENCOUNTER (OUTPATIENT)
Facility: MEDICAL CENTER | Age: 35
End: 2025-04-15
Attending: EMERGENCY MEDICINE | Admitting: STUDENT IN AN ORGANIZED HEALTH CARE EDUCATION/TRAINING PROGRAM
Payer: MEDICARE

## 2025-04-10 ENCOUNTER — APPOINTMENT (OUTPATIENT)
Dept: RADIOLOGY | Facility: MEDICAL CENTER | Age: 35
End: 2025-04-10
Attending: EMERGENCY MEDICINE
Payer: MEDICARE

## 2025-04-10 ENCOUNTER — PHARMACY VISIT (OUTPATIENT)
Dept: PHARMACY | Facility: MEDICAL CENTER | Age: 35
End: 2025-04-10
Payer: COMMERCIAL

## 2025-04-10 DIAGNOSIS — N20.0 KIDNEY STONE: ICD-10-CM

## 2025-04-10 DIAGNOSIS — G90.A POTS (POSTURAL ORTHOSTATIC TACHYCARDIA SYNDROME): ICD-10-CM

## 2025-04-10 DIAGNOSIS — M54.41 ACUTE BILATERAL LOW BACK PAIN WITH BILATERAL SCIATICA: ICD-10-CM

## 2025-04-10 DIAGNOSIS — Z91.89 AT RISK FOR CONSTIPATION: ICD-10-CM

## 2025-04-10 DIAGNOSIS — J44.9 CHRONIC OBSTRUCTIVE PULMONARY DISEASE, UNSPECIFIED COPD TYPE (HCC): ICD-10-CM

## 2025-04-10 DIAGNOSIS — M54.42 ACUTE BILATERAL LOW BACK PAIN WITH BILATERAL SCIATICA: ICD-10-CM

## 2025-04-10 DIAGNOSIS — R11.0 NAUSEA: ICD-10-CM

## 2025-04-10 DIAGNOSIS — M54.50 LUMBAR BACK PAIN: ICD-10-CM

## 2025-04-10 PROBLEM — I10 HYPERTENSION: Status: ACTIVE | Noted: 2025-04-10

## 2025-04-10 LAB
ALBUMIN SERPL BCP-MCNC: 4.2 G/DL (ref 3.2–4.9)
ALBUMIN/GLOB SERPL: 2 G/DL
ALP SERPL-CCNC: 61 U/L (ref 30–99)
ALT SERPL-CCNC: 24 U/L (ref 2–50)
ANION GAP SERPL CALC-SCNC: 13 MMOL/L (ref 7–16)
AST SERPL-CCNC: 21 U/L (ref 12–45)
BASOPHILS # BLD AUTO: 0.4 % (ref 0–1.8)
BASOPHILS # BLD: 0.03 K/UL (ref 0–0.12)
BILIRUB SERPL-MCNC: 0.4 MG/DL (ref 0.1–1.5)
BUN SERPL-MCNC: 14 MG/DL (ref 8–22)
CALCIUM ALBUM COR SERPL-MCNC: 9 MG/DL (ref 8.5–10.5)
CALCIUM SERPL-MCNC: 9.2 MG/DL (ref 8.5–10.5)
CHLORIDE SERPL-SCNC: 109 MMOL/L (ref 96–112)
CO2 SERPL-SCNC: 16 MMOL/L (ref 20–33)
CREAT SERPL-MCNC: 1 MG/DL (ref 0.5–1.4)
CRP SERPL HS-MCNC: 0.6 MG/DL (ref 0–0.75)
EOSINOPHIL # BLD AUTO: 0.1 K/UL (ref 0–0.51)
EOSINOPHIL NFR BLD: 1.5 % (ref 0–6.9)
ERYTHROCYTE [DISTWIDTH] IN BLOOD BY AUTOMATED COUNT: 43.8 FL (ref 35.9–50)
GFR SERPLBLD CREATININE-BSD FMLA CKD-EPI: 75 ML/MIN/1.73 M 2
GLOBULIN SER CALC-MCNC: 2.1 G/DL (ref 1.9–3.5)
GLUCOSE SERPL-MCNC: 137 MG/DL (ref 65–99)
HCT VFR BLD AUTO: 40.6 % (ref 37–47)
HGB BLD-MCNC: 14 G/DL (ref 12–16)
IMM GRANULOCYTES # BLD AUTO: 0.03 K/UL (ref 0–0.11)
IMM GRANULOCYTES NFR BLD AUTO: 0.4 % (ref 0–0.9)
LYMPHOCYTES # BLD AUTO: 1.33 K/UL (ref 1–4.8)
LYMPHOCYTES NFR BLD: 19.6 % (ref 22–41)
MCH RBC QN AUTO: 32.3 PG (ref 27–33)
MCHC RBC AUTO-ENTMCNC: 34.5 G/DL (ref 32.2–35.5)
MCV RBC AUTO: 93.8 FL (ref 81.4–97.8)
MONOCYTES # BLD AUTO: 0.6 K/UL (ref 0–0.85)
MONOCYTES NFR BLD AUTO: 8.8 % (ref 0–13.4)
NEUTROPHILS # BLD AUTO: 4.71 K/UL (ref 1.82–7.42)
NEUTROPHILS NFR BLD: 69.3 % (ref 44–72)
NRBC # BLD AUTO: 0 K/UL
NRBC BLD-RTO: 0 /100 WBC (ref 0–0.2)
PLATELET # BLD AUTO: 173 K/UL (ref 164–446)
PMV BLD AUTO: 11.9 FL (ref 9–12.9)
POTASSIUM SERPL-SCNC: 4.3 MMOL/L (ref 3.6–5.5)
PROT SERPL-MCNC: 6.3 G/DL (ref 6–8.2)
RBC # BLD AUTO: 4.33 M/UL (ref 4.2–5.4)
SODIUM SERPL-SCNC: 138 MMOL/L (ref 135–145)
WBC # BLD AUTO: 6.8 K/UL (ref 4.8–10.8)

## 2025-04-10 PROCEDURE — 96375 TX/PRO/DX INJ NEW DRUG ADDON: CPT

## 2025-04-10 PROCEDURE — 85025 COMPLETE CBC W/AUTO DIFF WBC: CPT

## 2025-04-10 PROCEDURE — 700111 HCHG RX REV CODE 636 W/ 250 OVERRIDE (IP): Mod: JZ | Performed by: EMERGENCY MEDICINE

## 2025-04-10 PROCEDURE — 700111 HCHG RX REV CODE 636 W/ 250 OVERRIDE (IP): Mod: JZ | Performed by: STUDENT IN AN ORGANIZED HEALTH CARE EDUCATION/TRAINING PROGRAM

## 2025-04-10 PROCEDURE — 770006 HCHG ROOM/CARE - MED/SURG/GYN SEMI*

## 2025-04-10 PROCEDURE — 86140 C-REACTIVE PROTEIN: CPT

## 2025-04-10 PROCEDURE — A9270 NON-COVERED ITEM OR SERVICE: HCPCS | Performed by: STUDENT IN AN ORGANIZED HEALTH CARE EDUCATION/TRAINING PROGRAM

## 2025-04-10 PROCEDURE — 72148 MRI LUMBAR SPINE W/O DYE: CPT

## 2025-04-10 PROCEDURE — 36415 COLL VENOUS BLD VENIPUNCTURE: CPT

## 2025-04-10 PROCEDURE — 80053 COMPREHEN METABOLIC PANEL: CPT

## 2025-04-10 PROCEDURE — 700102 HCHG RX REV CODE 250 W/ 637 OVERRIDE(OP): Performed by: STUDENT IN AN ORGANIZED HEALTH CARE EDUCATION/TRAINING PROGRAM

## 2025-04-10 PROCEDURE — 99223 1ST HOSP IP/OBS HIGH 75: CPT | Mod: AI | Performed by: STUDENT IN AN ORGANIZED HEALTH CARE EDUCATION/TRAINING PROGRAM

## 2025-04-10 PROCEDURE — 99285 EMERGENCY DEPT VISIT HI MDM: CPT

## 2025-04-10 PROCEDURE — 700105 HCHG RX REV CODE 258: Performed by: STUDENT IN AN ORGANIZED HEALTH CARE EDUCATION/TRAINING PROGRAM

## 2025-04-10 PROCEDURE — 96374 THER/PROPH/DIAG INJ IV PUSH: CPT

## 2025-04-10 PROCEDURE — 96376 TX/PRO/DX INJ SAME DRUG ADON: CPT

## 2025-04-10 RX ORDER — IPRATROPIUM BROMIDE AND ALBUTEROL SULFATE 2.5; .5 MG/3ML; MG/3ML
3 SOLUTION RESPIRATORY (INHALATION)
Status: DISCONTINUED | OUTPATIENT
Start: 2025-04-10 | End: 2025-04-15 | Stop reason: HOSPADM

## 2025-04-10 RX ORDER — ONDANSETRON 2 MG/ML
4 INJECTION INTRAMUSCULAR; INTRAVENOUS EVERY 4 HOURS PRN
Status: DISCONTINUED | OUTPATIENT
Start: 2025-04-10 | End: 2025-04-15 | Stop reason: HOSPADM

## 2025-04-10 RX ORDER — KETOROLAC TROMETHAMINE 15 MG/ML
15 INJECTION, SOLUTION INTRAMUSCULAR; INTRAVENOUS ONCE
Status: COMPLETED | OUTPATIENT
Start: 2025-04-10 | End: 2025-04-10

## 2025-04-10 RX ORDER — PROMETHAZINE HYDROCHLORIDE 25 MG/1
12.5-25 TABLET ORAL EVERY 4 HOURS PRN
Status: DISCONTINUED | OUTPATIENT
Start: 2025-04-10 | End: 2025-04-15 | Stop reason: HOSPADM

## 2025-04-10 RX ORDER — AMLODIPINE BESYLATE 5 MG/1
5 TABLET ORAL DAILY
Status: DISCONTINUED | OUTPATIENT
Start: 2025-04-11 | End: 2025-04-12

## 2025-04-10 RX ORDER — PROCHLORPERAZINE EDISYLATE 5 MG/ML
5-10 INJECTION INTRAMUSCULAR; INTRAVENOUS EVERY 4 HOURS PRN
Status: DISCONTINUED | OUTPATIENT
Start: 2025-04-10 | End: 2025-04-15 | Stop reason: HOSPADM

## 2025-04-10 RX ORDER — DIAZEPAM 10 MG/2ML
5 INJECTION, SOLUTION INTRAMUSCULAR; INTRAVENOUS ONCE
Status: COMPLETED | OUTPATIENT
Start: 2025-04-10 | End: 2025-04-10

## 2025-04-10 RX ORDER — IPRATROPIUM BROMIDE AND ALBUTEROL 20; 100 UG/1; UG/1
1 SPRAY, METERED RESPIRATORY (INHALATION) 4 TIMES DAILY PRN
COMMUNITY

## 2025-04-10 RX ORDER — LAMOTRIGINE 100 MG/1
50 TABLET ORAL 2 TIMES DAILY
Status: DISCONTINUED | OUTPATIENT
Start: 2025-04-10 | End: 2025-04-15 | Stop reason: HOSPADM

## 2025-04-10 RX ORDER — HYDROMORPHONE HYDROCHLORIDE 1 MG/ML
1 INJECTION, SOLUTION INTRAMUSCULAR; INTRAVENOUS; SUBCUTANEOUS
Status: DISCONTINUED | OUTPATIENT
Start: 2025-04-10 | End: 2025-04-10

## 2025-04-10 RX ORDER — LABETALOL HYDROCHLORIDE 5 MG/ML
10 INJECTION, SOLUTION INTRAVENOUS EVERY 4 HOURS PRN
Status: DISCONTINUED | OUTPATIENT
Start: 2025-04-10 | End: 2025-04-15 | Stop reason: HOSPADM

## 2025-04-10 RX ORDER — HYDROMORPHONE HYDROCHLORIDE 1 MG/ML
1 INJECTION, SOLUTION INTRAMUSCULAR; INTRAVENOUS; SUBCUTANEOUS EVERY 4 HOURS PRN
Status: DISCONTINUED | OUTPATIENT
Start: 2025-04-10 | End: 2025-04-11

## 2025-04-10 RX ORDER — ONDANSETRON 2 MG/ML
4 INJECTION INTRAMUSCULAR; INTRAVENOUS ONCE
Status: COMPLETED | OUTPATIENT
Start: 2025-04-10 | End: 2025-04-10

## 2025-04-10 RX ORDER — ZIPRASIDONE HYDROCHLORIDE 40 MG/1
40 CAPSULE ORAL
Status: DISCONTINUED | OUTPATIENT
Start: 2025-04-10 | End: 2025-04-15 | Stop reason: HOSPADM

## 2025-04-10 RX ORDER — ONDANSETRON 4 MG/1
4 TABLET, ORALLY DISINTEGRATING ORAL EVERY 4 HOURS PRN
Status: DISCONTINUED | OUTPATIENT
Start: 2025-04-10 | End: 2025-04-15 | Stop reason: HOSPADM

## 2025-04-10 RX ORDER — IBUPROFEN 800 MG/1
800 TABLET, FILM COATED ORAL 3 TIMES DAILY PRN
Status: DISCONTINUED | OUTPATIENT
Start: 2025-04-13 | End: 2025-04-15 | Stop reason: HOSPADM

## 2025-04-10 RX ORDER — ALBUTEROL SULFATE 90 UG/1
1-2 INHALANT RESPIRATORY (INHALATION) EVERY 4 HOURS PRN
Status: DISCONTINUED | OUTPATIENT
Start: 2025-04-10 | End: 2025-04-15 | Stop reason: HOSPADM

## 2025-04-10 RX ORDER — KETOROLAC TROMETHAMINE 15 MG/ML
15 INJECTION, SOLUTION INTRAMUSCULAR; INTRAVENOUS EVERY 6 HOURS
Status: COMPLETED | OUTPATIENT
Start: 2025-04-10 | End: 2025-04-13

## 2025-04-10 RX ORDER — HYDROMORPHONE HYDROCHLORIDE 1 MG/ML
1 INJECTION, SOLUTION INTRAMUSCULAR; INTRAVENOUS; SUBCUTANEOUS ONCE
Status: COMPLETED | OUTPATIENT
Start: 2025-04-10 | End: 2025-04-10

## 2025-04-10 RX ORDER — SODIUM CHLORIDE 9 MG/ML
INJECTION, SOLUTION INTRAVENOUS CONTINUOUS
Status: DISCONTINUED | OUTPATIENT
Start: 2025-04-10 | End: 2025-04-13

## 2025-04-10 RX ORDER — PROMETHAZINE HYDROCHLORIDE 25 MG/1
12.5-25 SUPPOSITORY RECTAL EVERY 4 HOURS PRN
Status: DISCONTINUED | OUTPATIENT
Start: 2025-04-10 | End: 2025-04-15 | Stop reason: HOSPADM

## 2025-04-10 RX ORDER — METOPROLOL SUCCINATE 50 MG/1
50 TABLET, EXTENDED RELEASE ORAL 2 TIMES DAILY
Status: DISCONTINUED | OUTPATIENT
Start: 2025-04-10 | End: 2025-04-12

## 2025-04-10 RX ORDER — ALBUTEROL SULFATE 90 UG/1
1-2 INHALANT RESPIRATORY (INHALATION) EVERY 4 HOURS PRN
COMMUNITY

## 2025-04-10 RX ORDER — ACETAMINOPHEN 325 MG/1
650 TABLET ORAL EVERY 6 HOURS PRN
Status: DISCONTINUED | OUTPATIENT
Start: 2025-04-10 | End: 2025-04-13

## 2025-04-10 RX ORDER — GABAPENTIN 400 MG/1
1200 CAPSULE ORAL 3 TIMES DAILY
Status: DISCONTINUED | OUTPATIENT
Start: 2025-04-10 | End: 2025-04-15 | Stop reason: HOSPADM

## 2025-04-10 RX ADMIN — HYDROMORPHONE HYDROCHLORIDE 1 MG: 1 INJECTION, SOLUTION INTRAMUSCULAR; INTRAVENOUS; SUBCUTANEOUS at 16:29

## 2025-04-10 RX ADMIN — HYDROMORPHONE HYDROCHLORIDE 1 MG: 1 INJECTION, SOLUTION INTRAMUSCULAR; INTRAVENOUS; SUBCUTANEOUS at 22:41

## 2025-04-10 RX ADMIN — METOPROLOL SUCCINATE 50 MG: 50 TABLET, EXTENDED RELEASE ORAL at 18:26

## 2025-04-10 RX ADMIN — HYDROMORPHONE HYDROCHLORIDE 1 MG: 1 INJECTION, SOLUTION INTRAMUSCULAR; INTRAVENOUS; SUBCUTANEOUS at 12:41

## 2025-04-10 RX ADMIN — ZIPRASIDONE HYDROCHLORIDE 40 MG: 40 CAPSULE ORAL at 20:59

## 2025-04-10 RX ADMIN — KETOROLAC TROMETHAMINE 15 MG: 15 INJECTION, SOLUTION INTRAMUSCULAR; INTRAVENOUS at 18:32

## 2025-04-10 RX ADMIN — SODIUM CHLORIDE: 9 INJECTION, SOLUTION INTRAVENOUS at 18:39

## 2025-04-10 RX ADMIN — GABAPENTIN 1200 MG: 400 CAPSULE ORAL at 18:49

## 2025-04-10 RX ADMIN — KETOROLAC TROMETHAMINE 15 MG: 15 INJECTION, SOLUTION INTRAMUSCULAR; INTRAVENOUS at 10:59

## 2025-04-10 RX ADMIN — LAMOTRIGINE 50 MG: 25 TABLET ORAL at 18:26

## 2025-04-10 RX ADMIN — DIAZEPAM 5 MG: 10 INJECTION, SOLUTION INTRAMUSCULAR; INTRAVENOUS at 15:29

## 2025-04-10 RX ADMIN — HYDROMORPHONE HYDROCHLORIDE 1 MG: 1 INJECTION, SOLUTION INTRAMUSCULAR; INTRAVENOUS; SUBCUTANEOUS at 18:27

## 2025-04-10 RX ADMIN — ONDANSETRON 4 MG: 2 INJECTION INTRAMUSCULAR; INTRAVENOUS at 10:59

## 2025-04-10 RX ADMIN — HYDROMORPHONE HYDROCHLORIDE 1 MG: 1 INJECTION, SOLUTION INTRAMUSCULAR; INTRAVENOUS; SUBCUTANEOUS at 10:59

## 2025-04-10 ASSESSMENT — LIFESTYLE VARIABLES
EVER FELT BAD OR GUILTY ABOUT YOUR DRINKING: NO
TOTAL SCORE: 0
TOTAL SCORE: 0
EVER HAD A DRINK FIRST THING IN THE MORNING TO STEADY YOUR NERVES TO GET RID OF A HANGOVER: NO
TOTAL SCORE: 0
HOW MANY TIMES IN THE PAST YEAR HAVE YOU HAD 5 OR MORE DRINKS IN A DAY: 0
HAVE YOU EVER FELT YOU SHOULD CUT DOWN ON YOUR DRINKING: NO
ALCOHOL_USE: NO
HAVE PEOPLE ANNOYED YOU BY CRITICIZING YOUR DRINKING: NO
DOES PATIENT WANT TO STOP DRINKING: CANNOT ASSESS
CONSUMPTION TOTAL: NEGATIVE
ON A TYPICAL DAY WHEN YOU DRINK ALCOHOL HOW MANY DRINKS DO YOU HAVE: 0
AVERAGE NUMBER OF DAYS PER WEEK YOU HAVE A DRINK CONTAINING ALCOHOL: 0

## 2025-04-10 ASSESSMENT — COGNITIVE AND FUNCTIONAL STATUS - GENERAL
DRESSING REGULAR UPPER BODY CLOTHING: A LOT
MOBILITY SCORE: 11
SUGGESTED CMS G CODE MODIFIER DAILY ACTIVITY: CK
DRESSING REGULAR LOWER BODY CLOTHING: A LOT
DAILY ACTIVITIY SCORE: 16
STANDING UP FROM CHAIR USING ARMS: A LOT
CLIMB 3 TO 5 STEPS WITH RAILING: TOTAL
MOVING TO AND FROM BED TO CHAIR: A LOT
SUGGESTED CMS G CODE MODIFIER MOBILITY: CL
HELP NEEDED FOR BATHING: A LOT
TURNING FROM BACK TO SIDE WHILE IN FLAT BAD: A LITTLE
TOILETING: A LOT
WALKING IN HOSPITAL ROOM: TOTAL
MOVING FROM LYING ON BACK TO SITTING ON SIDE OF FLAT BED: A LOT

## 2025-04-10 ASSESSMENT — SOCIAL DETERMINANTS OF HEALTH (SDOH)
WITHIN THE LAST YEAR, HAVE YOU BEEN HUMILIATED OR EMOTIONALLY ABUSED IN OTHER WAYS BY YOUR PARTNER OR EX-PARTNER?: NO
WITHIN THE LAST YEAR, HAVE YOU BEEN KICKED, HIT, SLAPPED, OR OTHERWISE PHYSICALLY HURT BY YOUR PARTNER OR EX-PARTNER?: NO
WITHIN THE LAST YEAR, HAVE YOU BEEN AFRAID OF YOUR PARTNER OR EX-PARTNER?: NO
WITHIN THE LAST YEAR, HAVE TO BEEN RAPED OR FORCED TO HAVE ANY KIND OF SEXUAL ACTIVITY BY YOUR PARTNER OR EX-PARTNER?: NO

## 2025-04-10 ASSESSMENT — PATIENT HEALTH QUESTIONNAIRE - PHQ9
5. POOR APPETITE OR OVEREATING: MORE THAN HALF THE DAYS
6. FEELING BAD ABOUT YOURSELF - OR THAT YOU ARE A FAILURE OR HAVE LET YOURSELF OR YOUR FAMILY DOWN: NOT AL ALL
4. FEELING TIRED OR HAVING LITTLE ENERGY: MORE THAN HALF THE DAYS
9. THOUGHTS THAT YOU WOULD BE BETTER OFF DEAD, OR OF HURTING YOURSELF: NOT AT ALL
7. TROUBLE CONCENTRATING ON THINGS, SUCH AS READING THE NEWSPAPER OR WATCHING TELEVISION: NEARLY EVERY DAY
3. TROUBLE FALLING OR STAYING ASLEEP OR SLEEPING TOO MUCH: NOT AT ALL
SUM OF ALL RESPONSES TO PHQ QUESTIONS 1-9: 11
SUM OF ALL RESPONSES TO PHQ9 QUESTIONS 1 AND 2: 4
2. FEELING DOWN, DEPRESSED, IRRITABLE, OR HOPELESS: MORE THAN HALF THE DAYS
8. MOVING OR SPEAKING SO SLOWLY THAT OTHER PEOPLE COULD HAVE NOTICED. OR THE OPPOSITE, BEING SO FIGETY OR RESTLESS THAT YOU HAVE BEEN MOVING AROUND A LOT MORE THAN USUAL: NOT AT ALL
1. LITTLE INTEREST OR PLEASURE IN DOING THINGS: MORE THAN HALF THE DAYS

## 2025-04-10 ASSESSMENT — PAIN DESCRIPTION - PAIN TYPE
TYPE: ACUTE PAIN
TYPE: ACUTE PAIN;CHRONIC PAIN
TYPE: ACUTE PAIN
TYPE: ACUTE PAIN;CHRONIC PAIN
TYPE: ACUTE PAIN
TYPE: ACUTE PAIN

## 2025-04-10 ASSESSMENT — FIBROSIS 4 INDEX
FIB4 SCORE: 0.87
FIB4 SCORE: 0.52

## 2025-04-10 NOTE — H&P
[facility] Medicine History & Physical Note    Date of Service  4/10/2025    Primary Care Physician  Fly Goodson M.D.    Consultants  None    Code Status  Full Code    Chief Complaint  Chief Complaint   Patient presents with    Back Pain     This morning around 0800        History of Presenting Illness  Kristin Balderrama is a 34 y.o. female who presented 4/10/2025 with severe low back pain.     Patient has a history of degenerative disc disease status post spinal surgery in 2010, Ehler-Danlos syndrome, inappropriate sinus tachycardia, morbid obesity, chronic urinary incontinence.  She was lifting some water bottles around today, when she felt a sudden pop in her lower back that she states that was above her L4 level.  Since then, she has had severe low back pain with shooting pain coming down her lower extremities.  She endorses numbness in her groin area and has not been able to urinate for about 7 hours.  She  was brought to the ER for further evaluation.    In ER, CBC/CMP unremarkable. Bladder scan of 190 ml.  Stat MRI lumbar spine without contrast performed showing no spinal or neuroforaminal stenosis noted.  Patient will be admitted for pain management and physical therapy.    I discussed the plan of care with patient.    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    Surgical History   has a past surgical history that includes neuro dest facet l/s w/ig sngl (04/21/2015); recovery (01/27/2016); [place] by laparoscopy (08/29/2010); lumbar fusion anterior (08/21/2012); other cardiac surgery (01/2016); tonsillectomy; bowel stimulator insertion (07/13/2016); gastroscopy with balloon dilatation (N/A, 01/04/2017); muscle biopsy (Right, 01/26/2017); other abdominal surgery; laminotomy; bowel stimulator insertion (03/10/2021); pr lap,diagnostic abdomen (02/14/2022); ovarian cystectomy (Right, 02/14/2022); pr percut fix prox/neck femur fx (Left, 01/28/2023); laparoscopic inguinal hernia repair (Right, 07/21/2023); hernia repair (Right, 07/21/2023); pr cystoscopy,insert ureteral stent (Right, 2/12/2024); pr cysto/uretero/pyeloscopy, dx (Right, 2/12/2024); and lasertripsy (Right, 2/12/2024).     Family History  family history includes Genitourinary () Problems in her sister; Heart Disease in her maternal grandmother and mother; Hypertension in her maternal grandmother, maternal uncle, and mother; No Known Problems in her sister; Other in her mother and sister; Sleep Apnea in her mother.   Family history reviewed with patient. There is no family history that is pertinent to the chief complaint.     Social History   reports that she has never smoked. She has never used smokeless tobacco. She reports that she does not drink alcohol and does not use drugs.    Allergies  Allergies   Allergen Reactions    Cefdinir Shortness of Breath and Itching     Tolerated 1/18/17  Tolerates ceftriaxone  Tolerated augmentin 8/2019     Depakote [Divalproex Sodium] Unspecified     Muscle spasms/muscle pain and weakness      Doxycycline Anaphylaxis and  "Vomiting     Other reaction(s): pustules/blisters  Other reaction(s): stomach pain    Montelukast [Singulair] Unspecified     Cardiac effusion    Vancomycin Itching     Pt becomes flushed in face and chest.   RXN=7/10/16    Wound Dressing Adhesive Rash     By pt report-\"removes skin totally off\"    Amitriptyline Unspecified     Headaches      Amoxicillin Rash      Tolerates augmentin    Aripiprazole [Abilify] Unspecified     Headaches/muscle twitching      Clindamycin Nausea         Other reaction(s): nausea stomach pain    Erythromycin Rash     .  Other reaction(s): nausea stomach pain    Flomax [Tamsulosin Hydrochloride] Swelling    Hydromorphone      Other reaction(s): vomiting    Levaquin Unspecified     Severe muscle cramps in legs  RXN=unknown  Other reaction(s): leg muscle cramps    Metformin Unspecified     Increased lactic acid     Other reaction(s): itching and rash/nausea vomiting    Sulfa Drugs Hives, Itching, Myalgia and Unspecified     Muscle pain and weakness    Other reaction(s): unknown    Tamsulosin Swelling     Swelling of legs    Tape Rash     Tears skin off  coban with Tegaderm tape ok intermittently  RXN=ongoing    Sulfamethoxazole W-Trimethoprim Rash    Ciprofloxacin Rash          Keflex Rash     Pt states she gets a rash with this medication  Tolerates ceftriaxone  Can take with Benadryl    Levofloxacin Unspecified     Leg muscle cramps    Metronidazole Rash     \"Vision problems\"  Other reaction(s): vision problems       Medications  Prior to Admission Medications   Prescriptions Last Dose Informant Patient Reported? Taking?   Galcanezumab-gnlm (EMGALITY) 120 MG/ML Solution Auto-injector  Patient No No   Sig: Inject 1 mL subcutaneously every month.   Melatonin 10 MG Tab  Patient Yes No   Sig: Take 10 mg by mouth at bedtime.   Ranolazine 1000 MG TABLET SR 12 HR   No No   Sig: Take 1 Tablet by mouth 2 times a day.   Rimegepant Sulfate (NURTEC) 75 MG TABLET DISPERSIBLE  Patient No No   Sig: Take " 1 tablet by mouth at onset of migraine or aura okay to repeat in 24 hours if needed.   amLODIPine (NORVASC) 5 MG Tab  Patient No No   Sig: Take 1 tablet by mouth every day.   aspirin 81 MG EC tablet   No No   Sig: Take 1 Tablet by mouth every day.   azithromycin (ZITHROMAX) 250 MG Tab   No No   Sig: Take 2 tabs by mouth ond day 1 then take 1 tab by mouth on days 2-5   dicyclomine (BENTYL) 10 MG Cap   No No   Sig: Take 1 Capsule by mouth 2 times a day as needed for abdominal cramping/discomfort   diphenhydrAMINE (BENADRYL) 25 MG Tab  Patient Yes No   Sig: Take 50 mg by mouth at bedtime.   docusate sodium (COLACE) 250 MG capsule   Yes No   Sig: Take 250 mg by mouth 2 times a day.   gabapentin (NEURONTIN) 400 MG Cap   No No   Sig: TAKE 3 CAPSULES BY MOUTH THREE TIMES DAILY.  FOR 30 DAY SUPPLY. DX: M79.7   isosorbide mononitrate SR (IMDUR) 30 MG TABLET SR 24 HR   No No   Sig: Take 1 Tablet by mouth every morning.   ivabradine (CORLANOR) 7.5 MG Tab tablet   No No   Sig: Take 1 Tablet by mouth 2 times a day with meals.   lamoTRIgine (LAMICTAL) 25 MG Tab  Patient No No   Sig: Take 2 Tablets by mouth 2 times a day.   methylPREDNISolone (MEDROL DOSEPAK) 4 MG Tablet Therapy Pack   No No   Sig: Use as directed on packaging   metoprolol SR (TOPROL XL) 50 MG TABLET SR 24 HR   No No   Sig: Take 1 Tablet by mouth 2 times a day.   nitroglycerin (NITROSTAT) 0.4 MG SL Tab  Patient No No   Sig: Place 1 Tablet under the tongue as needed for Chest Pain (may repeat for chest pain every 5 mins not to exceed 3 doses).   omeprazole (PRILOSEC) 20 MG delayed-release capsule  Patient No No   Sig: Take 2 Capsules by mouth 2 times a day.   scopolamine (TRANSDERM SCOP, 1.5 MG,) 1 mg/72hr PATCH 72 HR  Patient No No   Sig: Place 1 Patch on the skin every 72 hours.   spironolactone (ALDACTONE) 50 MG Tab  Patient No No   Sig: Take 1 Tablet by mouth every day.   sumatriptan (IMITREX) 20 MG/ACT nasal spray  Patient No No   Sig: Give 1 dose at  onset headache okay to repeat in 2 hours if needed for max of 2 doses in a 24 hour timeframe.   topiramate (TOPAMAX) 50 MG tablet  Patient No No   Sig: Take 1 Tablet by mouth 2 times a day.   ziprasidone (GEODON) 40 MG Cap  Patient No No   Sig: Take 1 capsule by mouth at bedtime.      Facility-Administered Medications: None       Physical Exam  Temp:  [36.6 °C (97.8 °F)] 36.6 °C (97.8 °F)  Pulse:  [49-69] 61  Resp:  [16] 16  BP: ()/(46-77) 111/60  SpO2:  [90 %-98 %] 92 %  Blood Pressure: 111/60   Temperature: 36.6 °C (97.8 °F)   Pulse: 61   Respiration: 16   Pulse Oximetry: 92 %       Physical Exam  Constitutional:       Appearance: Normal appearance. She is obese.   HENT:      Head: Normocephalic.      Nose: Nose normal.      Mouth/Throat:      Mouth: Mucous membranes are moist.   Eyes:      Pupils: Pupils are equal, round, and reactive to light.   Cardiovascular:      Rate and Rhythm: Normal rate and regular rhythm.      Pulses: Normal pulses.   Pulmonary:      Effort: Pulmonary effort is normal.      Breath sounds: Normal breath sounds.   Abdominal:      General: Abdomen is flat. Bowel sounds are normal.      Palpations: Abdomen is soft.   Musculoskeletal:      Cervical back: Neck supple.      Comments: Endorses pain upon palpation of paraspinal muscles and lower back  Range of motion limited in lower extremities due to pain, feet are warm and well-perfused   Skin:     General: Skin is warm.   Neurological:      Mental Status: She is alert and oriented to person, place, and time. Mental status is at baseline.   Psychiatric:         Mood and Affect: Mood normal.         Behavior: Behavior normal.         Thought Content: Thought content normal.         Judgment: Judgment normal.         Laboratory:  Recent Labs     04/10/25  1055   WBC 6.8   RBC 4.33   HEMOGLOBIN 14.0   HEMATOCRIT 40.6   MCV 93.8   MCH 32.3   MCHC 34.5   RDW 43.8   PLATELETCT 173   MPV 11.9     Recent Labs     04/10/25  1055   SODIUM 138  "  POTASSIUM 4.3   CHLORIDE 109   CO2 16*   GLUCOSE 137*   BUN 14   CREATININE 1.00   CALCIUM 9.2     Recent Labs     04/10/25  1055   ALTSGPT 24   ASTSGOT 21   ALKPHOSPHAT 61   TBILIRUBIN 0.4   GLUCOSE 137*         No results for input(s): \"NTPROBNP\" in the last 72 hours.      No results for input(s): \"TROPONINT\" in the last 72 hours.    Imaging:  MR-LUMBAR SPINE-W/O    (Results Pending)       no X-Ray or EKG requiring interpretation    Assessment/Plan:  Justification for Admission Status  I anticipate this patient will require at least two midnights for appropriate medical management, necessitating inpatient admission because patient has severe low back pain    Patient will need a Med/Surg bed on EMERGENCY service .  The need is secondary to low back pain.    * Low back pain- (present on admission)  Assessment & Plan  Patient with history of degenerative disc disease with spinal fusion in the past presents for acute onset low back pain, numbness in her groin area, urinary incontinence  Stat MRI lumbar spine was performed, no spinal or neuroforaminal stenosis appreciated  For now, admit patient overnight for pain management  PT OT  Consider neurosurgery consult in a.m.    Hypertension  Assessment & Plan  Resume home medications    Morbid obesity (HCC)  Assessment & Plan  BMI 44    Annetta-Danlos syndrome- (present on admission)  Assessment & Plan  History of    Schizoaffective disorder, depressive type (HCC)- (present on admission)  Assessment & Plan  Continue home medications        VTE prophylaxis: enoxaparin ppx  "

## 2025-04-10 NOTE — ED PROVIDER NOTES
CHIEF COMPLAINT  Chief Complaint   Patient presents with    Back Pain     This morning around 0800        LIMITATION TO HISTORY   NO    HPI    Kristin Balderrama is a 34 y.o. female who has history of back surgery with fusion with chronic urinary and bowel incontinence presents to the emergency department for evaluation of back pain onset 8 AM this morning. She describes that she was lifting a case of water, she heard a pop right above the hardware. Ever since then, the patient has been in severe pain and has not been able to move normally. The patient is having difficulty ambulating and notes she has tingling to her bilateral legs. The patient has taken gabapentin, tizanidine and Tylenol with no alleviation. The patient states this has never happened to her in the past. The patient reports that she typically gets a warning sign prior to her episodes of incontinence, but at this time, she is not able to feel this and states it is numb.     YES  bowel/bladder incontinence   NO saddle anesthesia  YES  extremity weakness  NO fever   NO IV drug use  NO immunocompromised  NO recent bacteremia  NO ab pain  NO history or AAA      OUTSIDE HISTORIAN(S):  NO    EXTERNAL RECORDS REVIEWED  Yes  It appears the patient has history of Anitha Danlos syndrome diabetes hypertension hyperlipidemia POTS syndrome and vasospasms her heart where she takes nitroglycerin  Seen in the ER for chest pain March 31.  CT chest pulmonary angiogram was negative for PE and heart score was 2  There is a history of the patient being on CellCept for osteomyelitis.   Also reviewed chart, which shows that the patient has had history of back surgery with fusion and history of incontinence.       PAST MEDICAL HISTORY  Past Medical History:   Diagnosis Date    Abdominal pain     Anginal syndrome     Random chest pain especially with tachycardia    Apnea, sleep     Arthritis     ASTHMA     when around smoke    Back pain     Borderline personality disorder  "(HCC)     Chickenpox     Chronic UTI 09/18/2010    Daytime sleepiness     Dental disorder 03/08/2021    Infected tooth    Depression     Diabetes (HCC)     Diarrhea     Incontinece    Disorder of thyroid     Hashimoto's    Fatigue     Frequent headaches     Heart burn     History of falling     Inappropriate sinus tachycardia (HCC) 2016    Statusp post ablation by Dr. Kumar.    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    Urinary bladder disorder     Suprapubic cath. 2/8/22: Not anymore.     Urinary incontinence     Weakness     Wears glasses        FAMILY HISTORY  Family History   Problem Relation Age of Onset    Hypertension Mother     Sleep Apnea Mother     Heart Disease Mother     Other Mother         hypothryod    No Known Problems Sister     Other Sister         Narcolepsy;fibromyalsia;bone;nerve    Genitourinary () Problems Sister         endometriosis    Hypertension Maternal Uncle     Heart Disease Maternal Grandmother     Hypertension Maternal Grandmother     Cancer Neg Hx        SOCIAL HISTORY  Social History     Tobacco Use    Smoking status: Never    Smokeless tobacco: Never   Vaping Use    Vaping status: Never Used   Substance Use Topics    Alcohol use: No     Alcohol/week: 0.0 oz    Drug use: Never     Social History     Substance and Sexual Activity   Drug Use Never       SURGICAL HISTORY  Past Surgical History: "   Procedure Laterality Date    NV CYSTOSCOPY,INSERT URETERAL STENT Right 2/12/2024    Procedure: CYSTOSCOPY, WITH RIGHT URETEROSCOPY, WITH LITHOTRIPSY, WITH INSERTION OF RIGHT URETERAL STENT;  Surgeon: Josafat Roberson M.D.;  Location: [facility];  Service: Urology    NV CYSTO/URETERO/PYELOSCOPY, DX Right 2/12/2024    Procedure: URETEROSCOPY;  Surgeon: Josafat Roberson M.D.;  Location: [facility];  Service: Urology    LASERTRIPSY Right 2/12/2024    Procedure: LITHOTRIPSY, USING LASER;  Surgeon: Josafat Roberson M.D.;  Location: [facility];  Service: Urology    LAPAROSCOPIC INGUINAL HERNIA REPAIR Right 07/21/2023    Procedure: LAPAROSCOPIC RIGHT INGUINAL HERNIA REPAIR WITH MESH;  Surgeon: Joe Noyola M.D.;  Location: [facility];  Service: General    HERNIA REPAIR Right 07/21/2023    PB PERCUT FIX PBOX/NECK FEMUR FX Left 01/28/2023    Procedure: FIXATION, HIP, USING CANNULATED SCREW;  Surgeon: Noman Abdul M.D.;  Location: [facility];  Service: Orthopedics    NV LAP,DIAGNOSTIC ABDOMEN  02/14/2022    Procedure: LAPAROSCOPY;  Surgeon: Seamus Pisano M.D.;  Location: SURGERY SAME DAY [facility];  Service: Gynecology    OVARIAN CYSTECTOMY Right 02/14/2022    Procedure: EXCISION, CYST, OVARY;  Surgeon: Seamus Pisano M.D.;  Location: SURGERY SAME DAY [facility];  Service: Gynecology    BOWEL STIMULATOR INSERTION  03/10/2021    Procedure: INSERTION, ELECTRODE LEADS AND PULSE GENERATOR, NEUROSTIMULATOR, SACRAL - REMOVAL OF INTERSTIM WITH REPLACEMENT OF SACRAL NEUROMODULATION DEVICE;  Surgeon: Joe Noyola M.D.;  Location: [facility];  Service: General    MUSCLE BIOPSY Right 01/26/2017    Procedure: MUSCLE BIOPSY - THIGH;  Surgeon: Isidro Vigil M.D.;  Location: [facility];  Service:     GASTROSCOPY WITH BALLOON DILATATION N/A 01/04/2017    Procedure: GASTROSCOPY WITH DILATATION;  Surgeon: Torres Vargas M.D.;  Location: [facility];   Service:     BOWEL STIMULATOR INSERTION  07/13/2016    Procedure: BOWEL STIMULATOR INSERTION FOR PERMANENT INTERSTIM SACRAL IMPLANT;  Surgeon: Joe Noyola M.D.;  Location: SURGERY [facility];  Service:     RECOVERY  01/27/2016    Procedure: CATH LAB EP STUDY WITH SINUS NODE MODIFICATION ABHINAV;  Surgeon: Lina Surgery;  [place] [facility] [place]  [place]:     OTHER CARDIAC SURGERY  01/2016    cardiac ablation    NEURO DEST FACET L/S W/IG SNGL  04/21/2015    Performed by Reza Tabor at SURGERY [facility]    LUMBAR FUSION ANTERIOR  08/21/2012    Performed by SUSIE SAWANT at SURGERY [facility] ORS    ALYSSA BY LAPAROSCOPY  08/29/2010    Performed by SHAYY JOHNS at SURGERY [facility]S    LAMINOTOMY      OTHER ABDOMINAL SURGERY      TONSILLECTOMY      tonsillectomy       CURRENT MEDICATIONS    Current Facility-Administered Medications:     diazePAM (Valium) injection 5 mg, 5 mg, Intravenous, Once, John Mann M.D.    Current Outpatient Medications:     ivabradine (CORLANOR) 7.5 MG Tab tablet, Take 1 Tablet by mouth 2 times a day with meals., Disp: 90 Tablet, Rfl: 3    azithromycin (ZITHROMAX) 250 MG Tab, Take 2 tabs by mouth ond day 1 then take 1 tab by mouth on days 2-5, Disp: 6 Tablet, Rfl: 0    gabapentin (NEURONTIN) 400 MG Cap, TAKE 3 CAPSULES BY MOUTH THREE TIMES DAILY.  FOR 30 DAY SUPPLY. DX: M79.7, Disp: 270 Capsule, Rfl: 0    methylPREDNISolone (MEDROL DOSEPAK) 4 MG Tablet Therapy Pack, Use as directed on packaging, Disp: 21 Each, Rfl: 0    dicyclomine (BENTYL) 10 MG Cap, Take 1 Capsule by mouth 2 times a day as needed for abdominal cramping/discomfort, Disp: 60 Capsule, Rfl: 1    Ranolazine 1000 MG TABLET SR 12 HR, Take 1 Tablet by mouth 2 times a day., Disp: 180 Tablet, Rfl: 3    isosorbide mononitrate SR (IMDUR) 30 MG TABLET SR 24 HR, Take 1 Tablet by mouth every morning., Disp: 90 Tablet, Rfl: 3    metoprolol SR (TOPROL XL) 50 MG TABLET SR  24 HR, Take 1 Tablet by mouth 2 times a day., Disp: 180 Tablet, Rfl: 3    aspirin 81 MG EC tablet, Take 1 Tablet by mouth every day., Disp: 100 Tablet, Rfl: 3    docusate sodium (COLACE) 250 MG capsule, Take 250 mg by mouth 2 times a day., Disp: , Rfl:     nitroglycerin (NITROSTAT) 0.4 MG SL Tab, Place 1 Tablet under the tongue as needed for Chest Pain (may repeat for chest pain every 5 mins not to exceed 3 doses)., Disp: 25 Tablet, Rfl: 11    spironolactone (ALDACTONE) 50 MG Tab, Take 1 Tablet by mouth every day., Disp: 90 Tablet, Rfl: 3    omeprazole (PRILOSEC) 20 MG delayed-release capsule, Take 2 Capsules by mouth 2 times a day., Disp: 180 Capsule, Rfl: 3    topiramate (TOPAMAX) 50 MG tablet, Take 1 Tablet by mouth 2 times a day., Disp: 180 Tablet, Rfl: 4    scopolamine (TRANSDERM SCOP, 1.5 MG,) 1 mg/72hr PATCH 72 HR, Place 1 Patch on the skin every 72 hours., Disp: 7 Patch, Rfl: 1    ziprasidone (GEODON) 40 MG Cap, Take 1 capsule by mouth at bedtime., Disp: 90 Capsule, Rfl: 1    amLODIPine (NORVASC) 5 MG Tab, Take 1 tablet by mouth every day., Disp: 90 Tablet, Rfl: 1    Galcanezumab-gnlm (EMGALITY) 120 MG/ML Solution Auto-injector, Inject 1 mL subcutaneously every month., Disp: 1 mL, Rfl: 11    lamoTRIgine (LAMICTAL) 25 MG Tab, Take 2 Tablets by mouth 2 times a day., Disp: 360 Tablet, Rfl: 1    Rimegepant Sulfate (NURTEC) 75 MG TABLET DISPERSIBLE, Take 1 tablet by mouth at onset of migraine or aura okay to repeat in 24 hours if needed., Disp: 8 Tablet, Rfl: 5    sumatriptan (IMITREX) 20 MG/ACT nasal spray, Give 1 dose at onset headache okay to repeat in 2 hours if needed for max of 2 doses in a 24 hour timeframe., Disp: 6 Each, Rfl: 5    diphenhydrAMINE (BENADRYL) 25 MG Tab, Take 50 mg by mouth at bedtime., Disp: , Rfl:     Melatonin 10 MG Tab, Take 10 mg by mouth at bedtime., Disp: , Rfl:       ALLERGIES  Allergies   Allergen Reactions    Cefdinir Shortness of Breath and Itching     Tolerated  "1/18/17  Tolerates ceftriaxone  Tolerated augmentin 8/2019     Depakote [Divalproex Sodium] Unspecified     Muscle spasms/muscle pain and weakness      Doxycycline Anaphylaxis and Vomiting     Other reaction(s): pustules/blisters  Other reaction(s): stomach pain    Montelukast [Singulair] Unspecified     Cardiac effusion    Vancomycin Itching     Pt becomes flushed in face and chest.   RXN=7/10/16    Wound Dressing Adhesive Rash     By pt report-\"removes skin totally off\"    Amitriptyline Unspecified     Headaches      Amoxicillin Rash      Tolerates augmentin    Aripiprazole [Abilify] Unspecified     Headaches/muscle twitching      Clindamycin Nausea         Other reaction(s): nausea stomach pain    Erythromycin Rash     .  Other reaction(s): nausea stomach pain    Flomax [Tamsulosin Hydrochloride] Swelling    Hydromorphone      Other reaction(s): vomiting    Levaquin Unspecified     Severe muscle cramps in legs  RXN=unknown  Other reaction(s): leg muscle cramps    Metformin Unspecified     Increased lactic acid     Other reaction(s): itching and rash/nausea vomiting    Sulfa Drugs Hives, Itching, Myalgia and Unspecified     Muscle pain and weakness    Other reaction(s): unknown    Tamsulosin Swelling     Swelling of legs    Tape Rash     Tears skin off  coban with Tegaderm tape ok intermittently  RXN=ongoing    Sulfamethoxazole W-Trimethoprim Rash    Ciprofloxacin Rash          Keflex Rash     Pt states she gets a rash with this medication  Tolerates ceftriaxone  Can take with Benadryl    Levofloxacin Unspecified     Leg muscle cramps    Metronidazole Rash     \"Vision problems\"  Other reaction(s): vision problems       PHYSICAL EXAM  VITAL SIGNS: /77   Pulse 69   Temp 36.6 °C (97.8 °F) (Temporal)   Resp 16   Ht 1.626 m (5' 4\")   Wt 117 kg (258 lb 6.4 oz)   SpO2 98%   BMI 44.35 kg/m²   Reviewed and WNL  Constitutional: Well developed, Well nourished, uncomfortable appearing and in mild distress.  HENT: " Normocephalic, atraumatic    Cardiovascular: Pedal pulses are 3+  Respiratory: no respiratory distress or stridor  Abdominal:  Abdomen soft, non-tender, non distended. No pulsatile masses  Skin: No  rash on the back noted  Genitourinary: No costovertebral angle tenderness.   Musculoskeletal: Tenderness throughout the back over L2 to L5 area with paraspinal tenderness.  Neurologic:   Strength Decreased foot flex extension on both secondary to pain. Unable to flex knees secondary to pain.   Sensation Good sensation to light touch on the ankles.   Saddle paresthesia NO  Psychiatric: Affect normal, Judgment normal, Mood normal.             PROBLEMS EVALUATED THIS VISIT:  Back pain sudden onset 8 AM. History of back surgery. Patient typically has urge to urinate and defecate, but lost this feeling this morning. Vital signs WNL. No fever. Patient is unable to ambulate and moved secondary to pain. No saddle paresthesia.     MEDICAL DECISION MAKING:  Back pain acute exacerbation with hx of surgery./ no red flags on screening for infection. Soft findings for cauda equina syndrome. Difficult to extinguish versus pain.     Cauda equina syndrome/emergency spinal cord impingement and spinal infectious process, such as osteomyelitis and epidural abscess, were considered and at this point with because of the patient's unusual symptoms performs we should rule out cauda equina syndrome with further evaluation.       PLAN:  9:22 AM - Patient seen and examined at bedside. This is a 34 year old woman who has history of back surgery with a fusion and chronic urinary and bowel incontinence presents to the emergency department for evaluation of back pain that started this morning after trying to lift a case of water and hearing a pop. Discussed plan of care, including performing lab work and imaging. Patient agrees to the plan of care. The patient will be medicated with Zofran 4 mg, Toradol 15 mg, Dilaudid 1 mg and Valium 5 mg. Ordered  for CMP, CBC w/ Diff., and MR-Lumbar Spine w/o to evaluate her symptoms.            RESULTS    LABS Ordered and Reviewed by Me:  Results for orders placed or performed during the hospital encounter of 04/10/25   CBC w/ Differential    Collection Time: 04/10/25 10:55 AM   Result Value Ref Range    WBC 6.8 4.8 - 10.8 K/uL    RBC 4.33 4.20 - 5.40 M/uL    Hemoglobin 14.0 12.0 - 16.0 g/dL    Hematocrit 40.6 37.0 - 47.0 %    MCV 93.8 81.4 - 97.8 fL    MCH 32.3 27.0 - 33.0 pg    MCHC 34.5 32.2 - 35.5 g/dL    RDW 43.8 35.9 - 50.0 fL    Platelet Count 173 164 - 446 K/uL    MPV 11.9 9.0 - 12.9 fL    Neutrophils-Polys 69.30 44.00 - 72.00 %    Lymphocytes 19.60 (L) 22.00 - 41.00 %    Monocytes 8.80 0.00 - 13.40 %    Eosinophils 1.50 0.00 - 6.90 %    Basophils 0.40 0.00 - 1.80 %    Immature Granulocytes 0.40 0.00 - 0.90 %    Nucleated RBC 0.00 0.00 - 0.20 /100 WBC    Neutrophils (Absolute) 4.71 1.82 - 7.42 K/uL    Lymphs (Absolute) 1.33 1.00 - 4.80 K/uL    Monos (Absolute) 0.60 0.00 - 0.85 K/uL    Eos (Absolute) 0.10 0.00 - 0.51 K/uL    Baso (Absolute) 0.03 0.00 - 0.12 K/uL    Immature Granulocytes (abs) 0.03 0.00 - 0.11 K/uL    NRBC (Absolute) 0.00 K/uL   Complete Metabolic Panel (CMP)    Collection Time: 04/10/25 10:55 AM   Result Value Ref Range    Sodium 138 135 - 145 mmol/L    Potassium 4.3 3.6 - 5.5 mmol/L    Chloride 109 96 - 112 mmol/L    Co2 16 (L) 20 - 33 mmol/L    Anion Gap 13.0 7.0 - 16.0    Glucose 137 (H) 65 - 99 mg/dL    Bun 14 8 - 22 mg/dL    Creatinine 1.00 0.50 - 1.40 mg/dL    Calcium 9.2 8.5 - 10.5 mg/dL    Correct Calcium 9.0 8.5 - 10.5 mg/dL    AST(SGOT) 21 12 - 45 U/L    ALT(SGPT) 24 2 - 50 U/L    Alkaline Phosphatase 61 30 - 99 U/L    Total Bilirubin 0.4 0.1 - 1.5 mg/dL    Albumin 4.2 3.2 - 4.9 g/dL    Total Protein 6.3 6.0 - 8.2 g/dL    Globulin 2.1 1.9 - 3.5 g/dL    A-G Ratio 2.0 g/dL   ESTIMATED GFR    Collection Time: 04/10/25 10:55 AM   Result Value Ref Range    GFR (CKD-EPI) 75 >60 mL/min/1.73 m 2      *Note: Due to a large number of results and/or encounters for the requested time period, some results have not been displayed. A complete set of results can be found in Results Review.         RADIOLOGY    MR-LUMBAR SPINE-W/O    (Results Pending)        ED COURSE:    ED Observation Status?  Yes.  Due to the patient's diagnostic uncertainty and need for evaluation and monitoring she be placed in observation status at about 3:30 PM.  Stable    INTERVENTIONS BY ME:  Medications   diazePAM (Valium) injection 5 mg (has no administration in time range)   ketorolac (Toradol) 15 MG/ML injection 15 mg (15 mg Intravenous Given 4/10/25 1059)   HYDROmorphone (Dilaudid) injection 1 mg (1 mg Intravenous Given 4/10/25 1059)   ondansetron (Zofran) syringe/vial injection 4 mg (4 mg Intravenous Given 4/10/25 1059)       Response on recheck:    11:50 AM - The patient was reevaluated at bedside. The patient states that she is feeling improved. She will receive medications PRN.     CONSULTANTS/OTHER GROUPS CONTACTED        FINAL DISPO PLAN   New Prescriptions    No medications on file     At this point we will go ahead and consider the patient being admitted for pain management.  I just reevaluated her at approximately 40 5 PM and the patient states that she is difficulty caring for self at baseline and obviously this is going to cause more of an issue.  Obviously wait for the MRI to do something surgical but we will go ahead and see if the hospitalist to be kind enough to see the patient before MRI findings are done.    CONDITION: Guarded.     FINAL IMPRESSION  1. Lumbar back pain        Olive RODRIGUEZ (Carolibsadie), am scribing for, and in the presence of, John Mann MD.    Electronically signed by: Olive Lemos (Scribe), 4/10/2025    IJohn MD personally performed the services described in this documentation, as scribed by Olive Lemos in my presence,  and it is both accurate and complete.     The note accurately reflects work and decisions made by me.  John Mann M.D.  4/10/2025  3:42 PM

## 2025-04-10 NOTE — ED TRIAGE NOTES
"Chief Complaint   Patient presents with    Back Pain     This morning around 0800      Pt states she was lifting a case of water and felt a pop in her back and hasn't been able to to bend over or walk since, states she has tingling in both of her legs. Patient has ROM in both extremities. Pt states she already experiences incontinence at baseline. Hx of fusion at L4 and L5, feels like the pain is right above that. Pt Ax0x4, in wheelchair to triage, educated on wait time and triage process.     /77   Pulse 69   Temp 36.6 °C (97.8 °F) (Temporal)   Resp 16   Ht 1.626 m (5' 4\")   Wt 117 kg (258 lb 6.4 oz)   SpO2 98%   BMI 44.35 kg/m²       "

## 2025-04-10 NOTE — ED NOTES
Med Rec completed per patient   Allergies reviewed    Patient completed a Z-Ilya yesterday (4/9/2025)   Patient completed a Medrol Dosepak 4/7/2025    Dispense history available in EPIC? Yes    Patient is not taking anticoagulants

## 2025-04-11 ENCOUNTER — APPOINTMENT (OUTPATIENT)
Dept: CARDIOLOGY | Facility: MEDICAL CENTER | Age: 35
End: 2025-04-11
Attending: INTERNAL MEDICINE
Payer: MEDICARE

## 2025-04-11 LAB
ANION GAP SERPL CALC-SCNC: 10 MMOL/L (ref 7–16)
BUN SERPL-MCNC: 12 MG/DL (ref 8–22)
CALCIUM SERPL-MCNC: 8.8 MG/DL (ref 8.5–10.5)
CHLORIDE SERPL-SCNC: 110 MMOL/L (ref 96–112)
CO2 SERPL-SCNC: 18 MMOL/L (ref 20–33)
CREAT SERPL-MCNC: 0.92 MG/DL (ref 0.5–1.4)
ERYTHROCYTE [DISTWIDTH] IN BLOOD BY AUTOMATED COUNT: 43.1 FL (ref 35.9–50)
ERYTHROCYTE [SEDIMENTATION RATE] IN BLOOD BY WESTERGREN METHOD: 11 MM/HOUR (ref 0–25)
GFR SERPLBLD CREATININE-BSD FMLA CKD-EPI: 83 ML/MIN/1.73 M 2
GLUCOSE SERPL-MCNC: 105 MG/DL (ref 65–99)
HCT VFR BLD AUTO: 41.1 % (ref 37–47)
HGB BLD-MCNC: 13.6 G/DL (ref 12–16)
MCH RBC QN AUTO: 31.2 PG (ref 27–33)
MCHC RBC AUTO-ENTMCNC: 33.1 G/DL (ref 32.2–35.5)
MCV RBC AUTO: 94.3 FL (ref 81.4–97.8)
PLATELET # BLD AUTO: 166 K/UL (ref 164–446)
PMV BLD AUTO: 11.7 FL (ref 9–12.9)
POTASSIUM SERPL-SCNC: 3.9 MMOL/L (ref 3.6–5.5)
RBC # BLD AUTO: 4.36 M/UL (ref 4.2–5.4)
SODIUM SERPL-SCNC: 138 MMOL/L (ref 135–145)
WBC # BLD AUTO: 6.1 K/UL (ref 4.8–10.8)

## 2025-04-11 PROCEDURE — 97535 SELF CARE MNGMENT TRAINING: CPT

## 2025-04-11 PROCEDURE — 700101 HCHG RX REV CODE 250: Mod: UD | Performed by: INTERNAL MEDICINE

## 2025-04-11 PROCEDURE — 700102 HCHG RX REV CODE 250 W/ 637 OVERRIDE(OP): Mod: UD | Performed by: STUDENT IN AN ORGANIZED HEALTH CARE EDUCATION/TRAINING PROGRAM

## 2025-04-11 PROCEDURE — 96372 THER/PROPH/DIAG INJ SC/IM: CPT

## 2025-04-11 PROCEDURE — 700111 HCHG RX REV CODE 636 W/ 250 OVERRIDE (IP): Mod: JZ,UD | Performed by: INTERNAL MEDICINE

## 2025-04-11 PROCEDURE — 96376 TX/PRO/DX INJ SAME DRUG ADON: CPT

## 2025-04-11 PROCEDURE — 80048 BASIC METABOLIC PNL TOTAL CA: CPT

## 2025-04-11 PROCEDURE — 97163 PT EVAL HIGH COMPLEX 45 MIN: CPT

## 2025-04-11 PROCEDURE — A9270 NON-COVERED ITEM OR SERVICE: HCPCS | Mod: UD | Performed by: STUDENT IN AN ORGANIZED HEALTH CARE EDUCATION/TRAINING PROGRAM

## 2025-04-11 PROCEDURE — 36415 COLL VENOUS BLD VENIPUNCTURE: CPT

## 2025-04-11 PROCEDURE — 97166 OT EVAL MOD COMPLEX 45 MIN: CPT

## 2025-04-11 PROCEDURE — G0378 HOSPITAL OBSERVATION PER HR: HCPCS

## 2025-04-11 PROCEDURE — 700111 HCHG RX REV CODE 636 W/ 250 OVERRIDE (IP): Mod: JZ | Performed by: STUDENT IN AN ORGANIZED HEALTH CARE EDUCATION/TRAINING PROGRAM

## 2025-04-11 PROCEDURE — 85027 COMPLETE CBC AUTOMATED: CPT

## 2025-04-11 PROCEDURE — 99233 SBSQ HOSP IP/OBS HIGH 50: CPT | Performed by: INTERNAL MEDICINE

## 2025-04-11 PROCEDURE — 700105 HCHG RX REV CODE 258: Performed by: STUDENT IN AN ORGANIZED HEALTH CARE EDUCATION/TRAINING PROGRAM

## 2025-04-11 PROCEDURE — 96375 TX/PRO/DX INJ NEW DRUG ADDON: CPT

## 2025-04-11 PROCEDURE — 700102 HCHG RX REV CODE 250 W/ 637 OVERRIDE(OP): Mod: UD | Performed by: INTERNAL MEDICINE

## 2025-04-11 PROCEDURE — A9270 NON-COVERED ITEM OR SERVICE: HCPCS | Mod: UD | Performed by: INTERNAL MEDICINE

## 2025-04-11 PROCEDURE — 85652 RBC SED RATE AUTOMATED: CPT

## 2025-04-11 RX ORDER — HYDROMORPHONE HYDROCHLORIDE 1 MG/ML
0.5 INJECTION, SOLUTION INTRAMUSCULAR; INTRAVENOUS; SUBCUTANEOUS
Status: DISCONTINUED | OUTPATIENT
Start: 2025-04-11 | End: 2025-04-13

## 2025-04-11 RX ORDER — OXYCODONE HYDROCHLORIDE 10 MG/1
10 TABLET ORAL
Refills: 0 | Status: DISCONTINUED | OUTPATIENT
Start: 2025-04-11 | End: 2025-04-15 | Stop reason: HOSPADM

## 2025-04-11 RX ORDER — ENOXAPARIN SODIUM 100 MG/ML
40 INJECTION SUBCUTANEOUS DAILY
Status: DISCONTINUED | OUTPATIENT
Start: 2025-04-11 | End: 2025-04-15 | Stop reason: HOSPADM

## 2025-04-11 RX ORDER — OXYCODONE HYDROCHLORIDE 5 MG/1
5 TABLET ORAL
Refills: 0 | Status: DISCONTINUED | OUTPATIENT
Start: 2025-04-11 | End: 2025-04-15 | Stop reason: HOSPADM

## 2025-04-11 RX ORDER — METHOCARBAMOL 500 MG/1
500 TABLET, FILM COATED ORAL 4 TIMES DAILY
Status: DISCONTINUED | OUTPATIENT
Start: 2025-04-11 | End: 2025-04-15 | Stop reason: HOSPADM

## 2025-04-11 RX ORDER — LIDOCAINE 4 G/G
1 PATCH TOPICAL EVERY 24 HOURS
Status: DISCONTINUED | OUTPATIENT
Start: 2025-04-11 | End: 2025-04-15 | Stop reason: HOSPADM

## 2025-04-11 RX ADMIN — SODIUM CHLORIDE: 9 INJECTION, SOLUTION INTRAVENOUS at 04:17

## 2025-04-11 RX ADMIN — KETOROLAC TROMETHAMINE 15 MG: 15 INJECTION, SOLUTION INTRAMUSCULAR; INTRAVENOUS at 11:42

## 2025-04-11 RX ADMIN — METOPROLOL SUCCINATE 50 MG: 50 TABLET, EXTENDED RELEASE ORAL at 16:26

## 2025-04-11 RX ADMIN — HYDROMORPHONE HYDROCHLORIDE 1 MG: 1 INJECTION, SOLUTION INTRAMUSCULAR; INTRAVENOUS; SUBCUTANEOUS at 05:44

## 2025-04-11 RX ADMIN — HYDROMORPHONE HYDROCHLORIDE 1 MG: 1 INJECTION, SOLUTION INTRAMUSCULAR; INTRAVENOUS; SUBCUTANEOUS at 09:32

## 2025-04-11 RX ADMIN — KETOROLAC TROMETHAMINE 15 MG: 15 INJECTION, SOLUTION INTRAMUSCULAR; INTRAVENOUS at 16:26

## 2025-04-11 RX ADMIN — OXYCODONE HYDROCHLORIDE 10 MG: 10 TABLET ORAL at 13:59

## 2025-04-11 RX ADMIN — KETOROLAC TROMETHAMINE 15 MG: 15 INJECTION, SOLUTION INTRAMUSCULAR; INTRAVENOUS at 05:36

## 2025-04-11 RX ADMIN — GABAPENTIN 1200 MG: 400 CAPSULE ORAL at 21:53

## 2025-04-11 RX ADMIN — GABAPENTIN 1200 MG: 400 CAPSULE ORAL at 15:09

## 2025-04-11 RX ADMIN — LAMOTRIGINE 50 MG: 25 TABLET ORAL at 05:36

## 2025-04-11 RX ADMIN — ZIPRASIDONE HYDROCHLORIDE 40 MG: 40 CAPSULE ORAL at 21:54

## 2025-04-11 RX ADMIN — LIDOCAINE 1 PATCH: 4 PATCH TOPICAL at 17:56

## 2025-04-11 RX ADMIN — METOPROLOL SUCCINATE 50 MG: 50 TABLET, EXTENDED RELEASE ORAL at 05:36

## 2025-04-11 RX ADMIN — LAMOTRIGINE 50 MG: 25 TABLET ORAL at 16:26

## 2025-04-11 RX ADMIN — METHOCARBAMOL 500 MG: 500 TABLET ORAL at 21:53

## 2025-04-11 RX ADMIN — HYDROMORPHONE HYDROCHLORIDE 0.5 MG: 1 INJECTION, SOLUTION INTRAMUSCULAR; INTRAVENOUS; SUBCUTANEOUS at 15:40

## 2025-04-11 RX ADMIN — AMLODIPINE BESYLATE 5 MG: 5 TABLET ORAL at 05:36

## 2025-04-11 RX ADMIN — GABAPENTIN 1200 MG: 400 CAPSULE ORAL at 07:47

## 2025-04-11 RX ADMIN — SODIUM CHLORIDE: 9 INJECTION, SOLUTION INTRAVENOUS at 14:25

## 2025-04-11 RX ADMIN — KETOROLAC TROMETHAMINE 15 MG: 15 INJECTION, SOLUTION INTRAMUSCULAR; INTRAVENOUS at 00:22

## 2025-04-11 RX ADMIN — ENOXAPARIN SODIUM 40 MG: 100 INJECTION SUBCUTANEOUS at 17:56

## 2025-04-11 RX ADMIN — METHOCARBAMOL 500 MG: 500 TABLET ORAL at 15:40

## 2025-04-11 ASSESSMENT — COGNITIVE AND FUNCTIONAL STATUS - GENERAL
MOBILITY SCORE: 11
TOILETING: A LOT
SUGGESTED CMS G CODE MODIFIER MOBILITY: CL
DAILY ACTIVITIY SCORE: 16
HELP NEEDED FOR BATHING: A LOT
TURNING FROM BACK TO SIDE WHILE IN FLAT BAD: A LOT
PERSONAL GROOMING: A LITTLE
SUGGESTED CMS G CODE MODIFIER DAILY ACTIVITY: CK
WALKING IN HOSPITAL ROOM: A LOT
CLIMB 3 TO 5 STEPS WITH RAILING: TOTAL
STANDING UP FROM CHAIR USING ARMS: A LOT
MOVING TO AND FROM BED TO CHAIR: A LOT
DRESSING REGULAR LOWER BODY CLOTHING: A LOT
MOVING FROM LYING ON BACK TO SITTING ON SIDE OF FLAT BED: A LOT
DRESSING REGULAR UPPER BODY CLOTHING: A LITTLE

## 2025-04-11 ASSESSMENT — PAIN DESCRIPTION - PAIN TYPE
TYPE: ACUTE PAIN

## 2025-04-11 ASSESSMENT — ENCOUNTER SYMPTOMS
MYALGIAS: 1
BACK PAIN: 1

## 2025-04-11 ASSESSMENT — ACTIVITIES OF DAILY LIVING (ADL): TOILETING: INDEPENDENT

## 2025-04-11 ASSESSMENT — GAIT ASSESSMENTS: GAIT LEVEL OF ASSIST: UNABLE TO PARTICIPATE

## 2025-04-11 NOTE — THERAPY
Occupational Therapy   Initial Evaluation     Patient Name: Kristin Balderrama  Age:  34 y.o., Sex:  female  Medical Record #: [id number]  Today's Date: 4/11/2025     Precautions  Precautions: Fall Risk  Comments: back pain    Assessment    Patient is 34 y.o. female admitted for severe low back pain. MRI showed no spinal or neuroforaminal stenosis. Other pertinent medical history includes DDD s/p spinal sx in 2010, Ehler-Danlos syndrome, sinus tachycardia, schizoaffective disorder, and chronic urinary incontinence. Pt seen for OT evaluation. Pt required min-mod A for bed mobility, mod A to stand, max A to don/doff socks, and max A for toilet hygiene. 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 Pt current functional performance limited by impaired activity tolerance, impaired balance, generalized weakness, and pain. Pt will benefit from skilled OT while admitted to acute care.     Plan    Occupational Therapy Initial Treatment Plan   Treatment Interventions: Self Care / Activities of Daily Living, Adaptive Equipment, Neuro Re-Education / Balance, Therapeutic Exercises, Therapeutic Activity  Treatment Frequency: 3 Times per Week  Duration: Until Therapy Goals Met    DC Equipment Recommendations: Unable to determine at this time  Discharge Recommendations: Recommend post-acute placement for additional occupational therapy services prior to discharge home      Objective     04/11/25 0957   Prior Living Situation   Prior Services None   Housing / Facility [address]   Steps Into Home   (ramp)   Bathroom Set up   (sponge baths only, has BSC)   Equipment Owned Front-Wheel Walker;Single Point Cane;Wheelchair;Bed Side Commode;Sock Aid   Lives with - Patient's Self Care Capacity Related Adult   Comments Pt lives with her aunt and grandmother who she sometimes assists.   Prior Level of ADL Function   Self Feeding Independent   Grooming / Hygiene Independent    Bathing Independent   Dressing Independent   Toileting Independent   Prior Level of IADL Function   Medication Management Independent   Laundry Independent   Kitchen Mobility Independent   Finances Independent   Home Management Independent   Shopping Independent   Prior Level Of Mobility Independent Without Device in Community;Independent Without Device in Home   Driving / Transportation Driving Independent   Occupation (Pre-Hospital Vocational)   (caregiver)   History of Falls   History of Falls Yes   Date of Last Fall   (Pt reported several falls in the past year due to vasospasms)   Precautions   Precautions Fall Risk   Comments back pain   Pain   Pain Scales 0 to 10 Scale    Pain 0 - 10 Group   Therapist Pain Assessment During Activity;Nurse Notified  (not rated, agreeable to session)   Non Verbal Descriptors   Non Verbal Scale  Calm   Cognition    Cognition / Consciousness WDL   Level of Consciousness Alert   Comments pleasant and cooperative   Passive ROM Upper Body   Passive ROM Upper Body WDL   Active ROM Upper Body   Comments some limitations to R shoulder due to prior injury, otherwise WFL   Strength Upper Body   Upper Body Strength  WDL   Sensation Upper Body   Upper Extremity Sensation  WDL   Upper Body Muscle Tone   Upper Body Muscle Tone  WDL   Coordination Upper Body   Coordination WDL   Balance Assessment   Sitting Balance (Static) Fair -   Sitting Balance (Dynamic) Fair -   Standing Balance (Static) Poor +   Weight Shift Sitting Fair   Comments x2 assist for safety   Bed Mobility    Supine to Sit Moderate Assist   Sit to Supine Minimal Assist   Scooting Minimal Assist   Rolling Minimal Assist to Rt.;Minimum Assist to Lt.   Comments HOB slightly elevated, cues for log roll   ADL Assessment   Lower Body Dressing Maximal Assist  (don/doff socks)   Toileting Maximal Assist  (incontinent of urine, reported that she is unable to tell when she needs to go (RN present and aware))   Functional Mobility    Sit to Stand Moderate Assist   Mobility EOB>stand x2>supine   Comments w/ FWW, x2 assist for safety   Visual Perception   Visual Perception  Not Tested   Activity Tolerance   Sitting in Chair NT   Sitting Edge of Bed ~10 min   Standing <5 min   Comments limited by weakness, pain   Patient / Family Goals   Patient / Family Goal #1 to go home   Short Term Goals   Short Term Goal # 1 Pt will perform ADL transfer w/ supv   Short Term Goal # 2 Pt will perform LB dressing w/ supv and AE PRN   Short Term Goal # 3 Pt will perform toilet hygiene w/ supv   Education Group   Education Provided Activities of Daily Living;Role of Occupational Therapist;Spinal Precautions   Role of Occupational Therapist Patient Response Patient;Acceptance;Explanation;Verbal Demonstration   Spinal Precautions Patient Response Patient;Acceptance;Explanation;Demonstration;Verbal Demonstration;Action Demonstration   ADL Patient Response Patient;Acceptance;Explanation;Verbal Demonstration;Demonstration;Action Demonstration   Occupational Therapy Initial Treatment Plan    Treatment Interventions Self Care / Activities of Daily Living;Adaptive Equipment;Neuro Re-Education / Balance;Therapeutic Exercises;Therapeutic Activity   Treatment Frequency 3 Times per Week   Duration Until Therapy Goals Met   Problem List   Problem List Decreased Active Daily Living Skills;Decreased Homemaking Skills;Decreased Upper Extremity AROM Right;Decreased Upper Extremity PROM Right;Decreased Functional Mobility;Decreased Activity Tolerance;Impaired Postural Control / Balance     Patient seen for team evaluation with Physical Therapist for the following reason(s):  Patient required 2 person assistance for safety and to provide effective interventions. Each discipline assisted patient with appropriate and separate goals. Due to the medical complexity, the skill of both practitioners is needed to monitor vitals, patient status, and adjust the intervention to fit the  patient's needs and goals. Occupational Therapist facilitated ADL participation and assessment of additional factors required for functional performance while Physical Therapist simultaneously treated pt according to POC.

## 2025-04-11 NOTE — RESPIRATORY CARE
EDUCATION by COPD CLINICAL EDUCATOR  4/11/2025 at 7:12 AM by Sulma Tate, RRT     34 year old patient reviewed by education team. Patient does not have a history or diagnosis of COPD and never smoked.  Therefore, patient does not qualify for the COPD program.

## 2025-04-11 NOTE — CARE PLAN
The patient is Stable - Low risk of patient condition declining or worsening    Shift Goals  Clinical Goals: Patient will remain at a comfortable pain level with medication per MAR.  Patient Goals: pain control, sleep  Family Goals: JODI    Progress made toward(s) clinical / shift goals:  Patient has been able to rest at a comfortable pain level this shift with medication per MAR. Patient has also maintained skin integrity this shift with proper interventions.    Patient is not progressing towards the following goals:

## 2025-04-11 NOTE — DIETARY
"Nutrition Services: Initial Assessment     Day 0 of admit. Kristin Balderrama is 34 y.o., female with admitting DX of Low back pain [M54.50].    Consult Received for: MST - Malnutrition Screening Tool    Current Hospital Problems List:    Principal Problem:    Low back pain (POA: Yes)  Active Problems:    Schizoaffective disorder, depressive type (HCC) (POA: Yes)    Annetta-Danlos syndrome (Chronic) (POA: Yes)    Morbid obesity (HCC) (POA: Unknown)    Hypertension (POA: Unknown)  Resolved Problems:    * No resolved hospital problems. *    Nutrition Assessment:      Height: 162.6 cm (5' 4\")  Weight: 117 kg (258 lb)  Weight taken via: Stand Up Scale (from ED this morning - pt stated this was correct. bed scale currently not working.)  BMI Calculated: 44.35  BMI Classification: Morbid Obesity       Weight Readings from Last 5 encounters:   Wt Readings from Last 5 Encounters:   04/10/25 117 kg (258 lb)   04/05/25 117 kg (258 lb)   04/04/25 117 kg (258 lb)   03/31/25 118 kg (261 lb 3.9 oz)   03/26/25 119 kg (263 lb)       Objective:   Pertinent Labs: reviewed. 4/10 Alb 4.2  Pertinent Meds: Reviewed  Skin/Wounds:  No skin injury per RN skin assessment  Food Allergies: None known  Last BM:     04/10/25 (per pt)       Current Diet Order/Intake:   Regular diet  Ensure Plus all meals.  Recorded PO intake %    Subjective:  Patient states she has not had as much of an appetite lately and is forcing herself to eat. Provided menu.    Nutrition Focused Physical Exam (NFPE)  Weight Loss: Patient with 1.6% weight loss x 2 weeks which does not meet criteria for malnutrition.  Muscle Mass: Well Nourished  Subcutaneous Fat: Well Nourished  Fluid Accumulation: 2+ edema BLE  Reduced  Strength: N/A in acute care setting.    Nutrition Diagnosis:      Based on RD assessment at this time, Patient does not meet criteria in congruence with ASPEN/Academy guidelines for malnutrition    Nutrition Interventions:      Diet and " supplements as ordered.  Patient aware of active plan of care as appropriate.       Nutrition Monitoring and Evaluation:      Monitor nutrition POC, goal for continued >50% intake from meals and supplements.  Additional fluids per MD/DO  Monitor vital signs pertinent to nutrition.    RD to monitor per department policy.      Yamileth Johnson R.D.                                         ASPEN/AND CRITERIA FOR MALNUTRITION

## 2025-04-11 NOTE — DISCHARGE PLANNING
Case Management Discharge Planning    Admission Date: 4/10/2025  GMLOS: 2.9  ALOS: 0    6-Clicks ADL Score: 16  6-Clicks Mobility Score: 11    Anticipated Discharge Dispo: Discharge Disposition: Disch to IP rehab facility or distinct part unit (62)    DME Needed: No    Action(s) Taken: Pt was discussed during IDT rounds. Per PT, pt would benefit from post-acute placemen however, pt was rolled to observation status this morning. Per UR RN, pt can only be rolled if inpatient care is being provided. Per bedside RN, new development in symptoms have been reported and MD may evaluate.     UPDATE 1530   LMSW met with pt at bedside to complete assessment and discuss post-acute placement. Per pt, she is agreeable with rehab consult. PMR consult was placed.    Escalations Completed: None    Medically Clear: No    Next Steps: LMSW to follow for any additional CM needs.    Barriers to Discharge: Medical clearance and Pending Placement    Is the patient up for discharge tomorrow: No

## 2025-04-11 NOTE — PROGRESS NOTES
Report received from dayshiinder RN and assumed patient care at 1900. Patient is A&Ox 4, on RA, and is awake & alert . Patient reporting a pain level of 8/10. Non-pharmacological measures provided until pain mediation is available. Call light within reach and bed in lowest position. Reinforced the need to call for assistance. Plan of care discussed and patient does not have any further needs at this time.

## 2025-04-11 NOTE — HOSPITAL COURSE
Kristin Balderrama is a 34 y.o. female who presented 4/10/2025 with severe low back pain.      Patient has a history of degenerative disc disease status post spinal surgery in 2010, Ehler-Danlos syndrome, inappropriate sinus tachycardia, morbid obesity, chronic urinary incontinence.  She was lifting some water bottles around today, when she felt a sudden pop in her lower back that she states that was above her L4 level.  Since then, she has had severe low back pain with shooting pain coming down her lower extremities.  She endorses numbness in her groin area and has not been able to urinate for about 7 hours.  She  was brought to the ER for further evaluation.     In ER, CBC/CMP unremarkable. Bladder scan of 190 ml.  Stat MRI lumbar spine without contrast performed showing no spinal or neuroforaminal stenosis noted.  Patient will be admitted for pain management and physical therapy.

## 2025-04-11 NOTE — ASSESSMENT & PLAN NOTE
4/14/2025  Patient with history of degenerative disc disease with spinal fusion in the past presents for acute onset low back pain, numbness in her groin area, urinary incontinence  Stat MRI lumbar spine was performed, no spinal or neuroforaminal stenosis appreciated  For now, admit patient overnight for pain management  PT OT recommend post acute

## 2025-04-11 NOTE — DISCHARGE PLANNING
Care Transition Team Assessment  PCP: Fly Goodson M.D.  LMSW met with pt at bedside to complete assessment. Pt A&Ox4 and able to verify the information on the face sheet. Please see H&P for medical hx and presenting problem. Pt lives with her grandmother and aunt in a single-story house at [address] [place] [address], [place] [address] that has no steps to enter. Prior to this hospitalization, pt reports being independent at home with ADLs and IADLs. Pt denies any DME at baseline including O2. Pt reports her aunt and sometimes grandmother as good support for her. 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 Pt denies any substance use or mental health concerns. Pt has ACP docs on file, DPOA is her grandmother, Vivienne Mclean [phone]. Pt confirmed she has Medicare and Medicaid FFS insurance. Pt confirmed she will have transportation home if DC home is indicated.  Information Source  Orientation Level: Oriented X4  Information Given By: Patient  Informant's Name: Kristin  Who is responsible for making decisions for patient? : Patient    Readmission Evaluation  Is this a readmission?: No    Elopement Risk  Legal Hold: No  Ambulatory or Self Mobile in Wheelchair: No-Not an Elopement Risk  Disoriented: No  Psychiatric Symptoms: None  History of Wandering: No  Elopement this Admit: No  Vocalizing Wanting to Leave: No  Displays Behaviors, Body Language Wanting to Leave: No-Not at Risk for Elopement  Elopement Risk: Not at Risk for Elopement    Interdisciplinary Discharge Planning  Lives with - Patient's Self Care Capacity: Related Adult  Patient or legal guardian wants to designate a caregiver: No  Housing / Facility: 1 [address] House  Prior Services: None    Discharge Preparedness  What is your plan after discharge?: Other (comment), Skilled nursing facility (IPR vs HH if pt remains observation status)  What are your discharge  supports?: Grandparent, Parent  Prior Functional Level: Ambulatory, Drives Self, Independent with Activities of Daily Living, Independent with Medication Management  Difficulity with ADLs: None  Difficulity with IADLs: None    Functional Assesment  Prior Functional Level: Ambulatory, Drives Self, Independent with Activities of Daily Living, Independent with Medication Management    Finances  Financial Barriers to Discharge: No  Prescription Coverage: Yes    Vision / Hearing Impairment  Vision Impairment : Yes  Right Eye Vision: Wears Glasses  Left Eye Vision: Wears Glasses  Hearing Impairment : Yes  Hearing Impairment: Both Ears (sometimes per pt)  Does Pt Need Special Equipment for the Hearing Impaired?: No    Advance Directive  Advance Directive?: DPOA for Health Care  Durable Power of  Name and Contact : Vivienne Haililiana [phone]    Domestic Abuse  Have you ever been the victim of abuse or violence?: No  Possible Abuse/Neglect Reported to:: Not Applicable    Psychological Assessment  History of Substance Abuse: None  History of Psychiatric Problems: Yes  Non-compliant with Treatment: No  Newly Diagnosed Illness: No    Discharge Risks or Barriers  Discharge risks or barriers?: Post-acute placement / services, Complex medical needs  Patient risk factors: Complex medical needs    Anticipated Discharge Information  Discharge Disposition: Disch to  rehab facility or distinct part unit (62)

## 2025-04-11 NOTE — PROGRESS NOTES
Patient is complaining that her bladder feels numb and does not get the urge to void. Patient said that she has to push her bladder in order to void. Notified Dr. Grant of new symptoms. No new orders received at this moment.

## 2025-04-11 NOTE — CARE PLAN
The patient is Stable - Low risk of patient condition declining or worsening    Shift Goals  Clinical Goals: pain management, PT OT  Patient Goals: pain management, PT OT  Family Goals: not present    Progress made toward(s) clinical / shift goals:  pt has been medicated for pain per MAR. PT OT recommending placement. Notified MD. Bed alarm placed. Updated the patient on POC. Pt verbalized understanding.       Problem: Pain - Standard  Goal: Alleviation of pain or a reduction in pain to the patient’s comfort goal  Description: Target End Date:  Prior to discharge or change in level of careDocument on Vitals flowsheet1.  Document pain using the appropriate pain scale per order or unit policy2.  Educate and implement non-pharmacologic comfort measures (i.e. relaxation, distraction, massage, cold/heat therapy, etc.)3.  Pain management medications as ordered4.  Reassess pain after pain med administration per policy5.  If opiods administered assess patient's response to pain medication is appropriate per POSS sedation scale6.  Follow pain management plan developed in collaboration with patient and interdisciplinary team (including palliative care or pain specialists if applicable)  Outcome: Progressing     Problem: Knowledge Deficit - Standard  Goal: Patient and family/care givers will demonstrate understanding of plan of care, disease process/condition, diagnostic tests and medications  Description: Target End Date:  1-3 days or as soon as patient condition allowsDocument in Patient Education1.  Patient and family/caregiver oriented to unit, equipment, visitation policy and means for communicating concern2.  Complete/review Learning Assessment3.  Assess knowledge level of disease process/condition, treatment plan, diagnostic tests and medications4.  Explain disease process/condition, treatment plan, diagnostic tests and medications  Outcome: Progressing     Problem: Fall Risk  Goal: Patient will remain free from  falls  Description: Target End Date:  Prior to discharge or change in level of careDocument interventions on the [place] [facility] Fall Risk Assessment1.  Assess for fall risk factors2.  Implement fall precautions  Outcome: Progressing

## 2025-04-11 NOTE — PROGRESS NOTES
4 Eyes Skin Assessment Completed by SONYA Gama and SONYA Bush.    Head WDL  Ears WDL  Nose WDL  Mouth WDL  Neck WDL  Breast/Chest WDL  Shoulder Blades WDL  Spine WDL  (R) Arm/Elbow/Hand WDL  (L) Arm/Elbow/Hand Bruising  Abdomen WDL  Groin WDL  Scrotum/Coccyx/Buttocks Redness and Blanching  (R) Leg WDL  (L) Leg WDL  (R) Heel/Foot/Toe Redness and Blanching, Calloused  (L) Heel/Foot/Toe Redness and Blanching, Calloused                Devices In Places NA      Interventions In Place Pillows and Pressure Redistribution Mattress    Possible Skin Injury No    Pictures Uploaded Into Epic Yes  Wound Consult Placed N/A  RN Wound Prevention Protocol Ordered No

## 2025-04-11 NOTE — THERAPY
"Physical Therapy   Initial Evaluation     Patient Name: Kristin Balderrama  Age:  34 y.o., Sex:  female  Medical Record #: [id number]  Today's Date: 4/11/2025     Precautions  Precautions: (P) Fall Risk  Comments: (P) spinal precautions for comfort    Assessment  Patient is 34 y.o. female admitted for severe low back pain. MRI showed no spinal or neuroforaminal stenosis. PMH includes DDD s/p spinal sx in 2010, Ehler-Danlos syndrome, sinus tachycardia, schizoaffective disorder, and chronic urinary incontinence.     Patient received in bed and agreeable to PT session. Pt able to perform bed mobility with min-modA and cues for log roll. Pt able to perform STS x2 with FWW and modA; pt moderately unsteady with posterior LOB and bilateral knee buckling. Pt with significant increase in pain with all mobility. Pt is primarily limited by pain and impaired functional strength, balance, and activity tolerance. Recommend post acute placement. Will follow for acute care PT needs.     Plan    Physical Therapy Initial Treatment Plan   Treatment Plan : (P) Bed Mobility, Gait Training, Neuro Re-Education / Balance, Self Care / Home Evaluation, Therapeutic Activities, Therapeutic Exercise  Treatment Frequency: (P) 4 Times per Week  Duration: (P) Until Therapy Goals Met    DC Equipment Recommendations: (P) Unable to determine at this time  Discharge Recommendations: (P) Recommend post-acute placement for additional physical therapy services prior to discharge home       Subjective    \"No I'm the tooth fairy\". (When asked if her name was Kristin)     Objective       04/11/25 0958   Initial Contact Note    Initial Contact Note Order Received and Verified, Physical Therapy Evaluation in Progress with Full Report to Follow.   Precautions   Precautions Fall Risk   Comments spinal precautions for comfort   Vitals   O2 (LPM) 0   O2 Delivery Device None - Room Air   Vitals Comments VSS, declines dizziness   Pain 0 - 10 Group   Location Back "   Therapist Pain Assessment Post Activity Pain Same as Prior to Activity;Nurse Notified  (pain not rated, pre-medicated for PT session)   Prior Living Situation   Prior Services None   Housing / Facility 1 Story House   Steps Into Home   (ramp)   Equipment Owned Front-Wheel Walker;Single Point Cane;Wheelchair   Lives with - Patient's Self Care Capacity Related Adult   Comments Pt lives with her grandmother and aunt who she sometimes assisrs.   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   History of Falls   History of Falls Yes   Date of Last Fall   (reports multiple falls in the last year due to vasospasms)   Cognition    Cognition / Consciousness WDL   Level of Consciousness Alert   Comments pleasant and participatory, receptive to education   Active ROM Lower Body    Active ROM Lower Body  X   Comments BLE's limited 2/2 pain, LLE more involved than RLE   Strength Lower Body   Lower Body Strength  X   Comments BLE's primarily limited 2/2 pain, RLE grossly 3/5, LLE grossly 2/5   Sensation Lower Body   Comments reports decreased sensation on R calf   Neurological Concerns   Comments pt reporting N/T in groin and difficulty with urinating/BM; RN present and aware.   Other Treatments   Other Treatments Provided Extensive discussion on spinal precautions for comfort including avoiding bending/lifting/twisting, log rolling, and optimal sleeping positions   Balance Assessment   Sitting Balance (Static) Fair -   Sitting Balance (Dynamic) Fair -   Standing Balance (Static) Poor +   Weight Shift Sitting Fair   Comments w/FWW   Bed Mobility    Supine to Sit Moderate Assist   Sit to Supine Minimal Assist   Scooting Minimal Assist   Rolling Minimal Assist to Rt.;Minimum Assist to Lt.   Comments  HOB slightly elevated, cues for log roll   Gait Analysis   Gait Level Of Assist Unable to Participate   Functional Mobility   Sit to Stand Moderate Assist   Bed, Chair, Wheelchair Transfer Unable to Participate   Mobility supine > EOB > STS x2 > supine   6 Clicks Assessment - How much HELP from from another person do you currently need... (If the patient hasn't done an activity recently, how much help from another person do you think he/she would need if he/she tried?)   Turning from your back to your side while in a flat bed without using bedrails? 2   Moving from lying on your back to sitting on the side of a flat bed without using bedrails? 2   Moving to and from a bed to a chair (including a wheelchair)? 2   Standing up from a chair using your arms (e.g., wheelchair, or bedside chair)? 2   Walking in hospital room? 2   Climbing 3-5 steps with a railing? 1   6 clicks Mobility Score 11   Short Term Goals    Short Term Goal # 1 Pt will be able to perform supine <> sit with SPV in 6 visits to progress bed mobility   Short Term Goal # 2 Pt will be able to perform STS with FWW and SPV in 6 visits to progress functional mobility   Short Term Goal # 3 Pt will be able to ambulate 150ft with SPV in 6 visits to progress functional gait   Education Group   Education Provided Role of Physical Therapist;Spine Precautions   Spine Precautions Patient Response Patient;Acceptance;Explanation;Verbal Demonstration;Action Demonstration   Role of Physical Therapist Patient Response Patient;Acceptance;Explanation;Verbal Demonstration   Physical Therapy Initial Treatment Plan    Treatment Plan  Bed Mobility;Gait Training;Neuro Re-Education / Balance;Self Care / Home Evaluation;Therapeutic Activities;Therapeutic Exercise   Treatment Frequency 4 Times per Week   Duration Until Therapy Goals Met   Problem List    Problems Pain;Impaired Bed Mobility;Impaired Transfers;Impaired Ambulation;Functional ROM Deficit;Functional Strength  Deficit;Impaired Balance;Decreased Activity Tolerance   Anticipated Discharge Equipment and Recommendations   DC Equipment Recommendations Unable to determine at this time   Discharge Recommendations Recommend post-acute placement for additional physical therapy services prior to discharge home   Interdisciplinary Plan of Care Collaboration   IDT Collaboration with  Nursing;Occupational Therapist   Patient Position at End of Therapy In Bed;Bed Alarm On;Call Light within Reach;Tray Table within Reach;Phone within Reach   Collaboration Comments RN updated   Session Information   Date / Session Number  4/11 - 1 (1/4, 4/17)     Patient seen for team evaluation with Occupational Therapist for the following reason(s):  Patient required 2 person assistance for safety and to provide effective interventions. Each discipline assisted patient with appropriate and separate goals. Due to the medical complexity, the skill of both practitioners is needed to monitor vitals, patient status, and adjust the intervention to fit the patient's needs and goals. Therapy sessions needed to be done by a certain time period for both disciplines, and did not impede patient's progress.

## 2025-04-11 NOTE — PROGRESS NOTES
[facility] Medicine Daily Progress Note    Date of Service  4/11/2025    Chief Complaint  Kristin Balderrama is a 34 y.o. female admitted 4/10/2025 with   Chief Complaint   Patient presents with    Back Pain     This morning around 0800        Hospital Course  Kristin Balderrama is a 34 y.o. female who presented 4/10/2025 with severe low back pain.      Patient has a history of degenerative disc disease status post spinal surgery in 2010, Ehler-Danlos syndrome, inappropriate sinus tachycardia, morbid obesity, chronic urinary incontinence.  She was lifting some water bottles around today, when she felt a sudden pop in her lower back that she states that was above her L4 level.  Since then, she has had severe low back pain with shooting pain coming down her lower extremities.  She endorses numbness in her groin area and has not been able to urinate for about 7 hours.  She  was brought to the ER for further evaluation.     In ER, CBC/CMP unremarkable. Bladder scan of 190 ml.  Stat MRI lumbar spine without contrast performed showing no spinal or neuroforaminal stenosis noted.  Patient will be admitted for pain management and physical therapy.    Interval Problem Update  Patient was seen and examined at bedside.  I have personally reviewed and interpreted vitals, labs, and imaging.    4/11.  Afebrile.  Intermittent bradycardia.  Intermittent hypotension.  On room air.  Denies any fever, chills, chest pains, shortness of breath.  Complains of severe lower back pain.  PT/OT recommended postacute placement.  Consulted inpatient rehab.  Started on muscle relaxants with methocarbamol, Lidoderm patch.  Already on high-dose gabapentin.  No signs of stenosis or herniation on MRI lumbar.  Patient does report having to push down on her bladder in order to pee when previously she used to be incontinent.  She has some multiple allergies including to sulfa and tamsulosin.  Will continue to monitor bowel and bladder function.   Ordered bladder scan protocol.  Patient reports decreased appetite with solids and liquids, fatigue, weakness.  Though she is swallowing.  Evaluated by nutrition and does not meet criteria for malnutrition.  ESR 11  CRP 0.6    I have discussed this patient's plan of care and discharge plan at IDT rounds today with Case Management, Nursing, Nursing leadership, and other members of the IDT team.    Consultants/Specialty  None    Code Status  Full Code    Disposition  The patient is not medically cleared for discharge to home or a post-acute facility.  Anticipate discharge to: skilled nursing facility    I have placed the appropriate orders for post-discharge needs.    Review of Systems  Review of Systems   Constitutional:  Positive for malaise/fatigue.   Musculoskeletal:  Positive for back pain and myalgias.        Physical Exam  Temp:  [36.1 °C (97 °F)-36.6 °C (97.8 °F)] 36.2 °C (97.1 °F)  Pulse:  [49-69] 62  Resp:  [16-20] 16  BP: ()/(46-77) 122/70  SpO2:  [90 %-98 %] 95 %    Physical Exam  Vitals and nursing note reviewed.   Constitutional:       Appearance: Normal appearance. She is obese. She is ill-appearing.   HENT:      Head: Normocephalic and atraumatic.      Right Ear: External ear normal.      Left Ear: External ear normal.      Nose: Nose normal.      Mouth/Throat:      Mouth: Mucous membranes are moist.      Pharynx: Oropharynx is clear. No oropharyngeal exudate or posterior oropharyngeal erythema.   Eyes:      Extraocular Movements: Extraocular movements intact.      Conjunctiva/sclera: Conjunctivae normal.   Cardiovascular:      Rate and Rhythm: Normal rate and regular rhythm.      Pulses: Normal pulses.      Heart sounds: Normal heart sounds. No murmur heard.  Pulmonary:      Effort: Pulmonary effort is normal. No respiratory distress.      Breath sounds: Normal breath sounds. No stridor. No wheezing or rales.   Abdominal:      General: Abdomen is flat. Bowel sounds are normal. There is no  distension.      Palpations: Abdomen is soft. There is no mass.      Tenderness: There is no abdominal tenderness.   Musculoskeletal:         General: Tenderness present.      Cervical back: Normal range of motion.   Skin:     General: Skin is warm.      Capillary Refill: Capillary refill takes less than 2 seconds.   Neurological:      General: No focal deficit present.      Mental Status: She is alert and oriented to person, place, and time. Mental status is at baseline.      Cranial Nerves: No cranial nerve deficit.   Psychiatric:         Mood and Affect: Mood normal.         Behavior: Behavior normal.         Fluids    Intake/Output Summary (Last 24 hours) at 4/11/2025 0804  Last data filed at 4/11/2025 0622  Gross per 24 hour   Intake 1155.76 ml   Output 650 ml   Net 505.76 ml        Laboratory  Recent Labs     04/10/25  1055 04/11/25  0019   WBC 6.8 6.1   RBC 4.33 4.36   HEMOGLOBIN 14.0 13.6   HEMATOCRIT 40.6 41.1   MCV 93.8 94.3   MCH 32.3 31.2   MCHC 34.5 33.1   RDW 43.8 43.1   PLATELETCT 173 166   MPV 11.9 11.7     Recent Labs     04/10/25  1055 04/11/25  0019   SODIUM 138 138   POTASSIUM 4.3 3.9   CHLORIDE 109 110   CO2 16* 18*   GLUCOSE 137* 105*   BUN 14 12   CREATININE 1.00 0.92   CALCIUM 9.2 8.8                   Imaging  MR-LUMBAR SPINE-W/O   Final Result      1.  Postsurgical changes as described above.   2.  There is no spinal or neural foraminal stenosis. There has been no significant interval change.           Assessment/Plan  * Low back pain- (present on admission)  Assessment & Plan  4/11/2025  Patient with history of degenerative disc disease with spinal fusion in the past presents for acute onset low back pain, numbness in her groin area, urinary incontinence  Stat MRI lumbar spine was performed, no spinal or neuroforaminal stenosis appreciated  For now, admit patient overnight for pain management  PT OT    Hypertension  Assessment & Plan  4/11/2025  Resume home medications    Morbid obesity  (HCC)  Assessment & Plan  Body mass index is 44.29 kg/m².  Counseled about diet and exercise    Annetta-Danlos syndrome- (present on admission)  Assessment & Plan  4/11/2025  History of    Schizoaffective disorder, depressive type (HCC)- (present on admission)  Assessment & Plan  4/11/2025  Continue home medications         VTE prophylaxis: Lovenox    I have performed a physical exam and reviewed and updated ROS and Plan today (4/11/2025). In review of yesterday's note (4/10/2025), there are no changes except as documented above.    Greater than 50 minutes spent prepping to see patient (e.g. review of tests) obtaining and/or reviewing separately obtained history. Performing a medically appropriate examination and/ evaluation.  Counseling and educating the patient/family/caregiver.  Ordering medications, tests, or procedures.  Referring and communicating with other health care professionals.  Documenting clinical information in EPIC.  Independently interpreting results and communicating results to patient/family/caregiver.  Care coordination.

## 2025-04-11 NOTE — DISCHARGE PLANNING
Patient rolled back to observation/outpatient status per attending MD determination (Noman Grant DO) and UR committee MD secondary review (Dariusz Ramos MD).  Condition Code 44 completed.

## 2025-04-12 LAB
ALBUMIN SERPL BCP-MCNC: 4 G/DL (ref 3.2–4.9)
BUN SERPL-MCNC: 9 MG/DL (ref 8–22)
CALCIUM ALBUM COR SERPL-MCNC: 9.1 MG/DL (ref 8.5–10.5)
CALCIUM SERPL-MCNC: 9.1 MG/DL (ref 8.5–10.5)
CHLORIDE SERPL-SCNC: 113 MMOL/L (ref 96–112)
CO2 SERPL-SCNC: 15 MMOL/L (ref 20–33)
CREAT SERPL-MCNC: 0.78 MG/DL (ref 0.5–1.4)
EKG IMPRESSION: NORMAL
ERYTHROCYTE [DISTWIDTH] IN BLOOD BY AUTOMATED COUNT: 44.2 FL (ref 35.9–50)
GFR SERPLBLD CREATININE-BSD FMLA CKD-EPI: 101 ML/MIN/1.73 M 2
GLUCOSE SERPL-MCNC: 112 MG/DL (ref 65–99)
HCT VFR BLD AUTO: 46.6 % (ref 37–47)
HGB BLD-MCNC: 15 G/DL (ref 12–16)
MAGNESIUM SERPL-MCNC: 2 MG/DL (ref 1.5–2.5)
MCH RBC QN AUTO: 31.2 PG (ref 27–33)
MCHC RBC AUTO-ENTMCNC: 32.2 G/DL (ref 32.2–35.5)
MCV RBC AUTO: 96.9 FL (ref 81.4–97.8)
PHOSPHATE SERPL-MCNC: 2.7 MG/DL (ref 2.5–4.5)
PLATELET # BLD AUTO: 147 K/UL (ref 164–446)
PMV BLD AUTO: 11.5 FL (ref 9–12.9)
POTASSIUM SERPL-SCNC: 4.2 MMOL/L (ref 3.6–5.5)
RBC # BLD AUTO: 4.81 M/UL (ref 4.2–5.4)
SODIUM SERPL-SCNC: 138 MMOL/L (ref 135–145)
TROPONIN T SERPL-MCNC: <6 NG/L (ref 6–19)
WBC # BLD AUTO: 4.4 K/UL (ref 4.8–10.8)

## 2025-04-12 PROCEDURE — 85027 COMPLETE CBC AUTOMATED: CPT

## 2025-04-12 PROCEDURE — 80069 RENAL FUNCTION PANEL: CPT

## 2025-04-12 PROCEDURE — 93005 ELECTROCARDIOGRAM TRACING: CPT | Mod: TC

## 2025-04-12 PROCEDURE — 700111 HCHG RX REV CODE 636 W/ 250 OVERRIDE (IP): Mod: JZ,UD | Performed by: STUDENT IN AN ORGANIZED HEALTH CARE EDUCATION/TRAINING PROGRAM

## 2025-04-12 PROCEDURE — 700101 HCHG RX REV CODE 250: Mod: UD | Performed by: INTERNAL MEDICINE

## 2025-04-12 PROCEDURE — 96376 TX/PRO/DX INJ SAME DRUG ADON: CPT

## 2025-04-12 PROCEDURE — 96375 TX/PRO/DX INJ NEW DRUG ADDON: CPT

## 2025-04-12 PROCEDURE — 700102 HCHG RX REV CODE 250 W/ 637 OVERRIDE(OP): Mod: UD | Performed by: STUDENT IN AN ORGANIZED HEALTH CARE EDUCATION/TRAINING PROGRAM

## 2025-04-12 PROCEDURE — 700102 HCHG RX REV CODE 250 W/ 637 OVERRIDE(OP): Mod: UD | Performed by: INTERNAL MEDICINE

## 2025-04-12 PROCEDURE — 36415 COLL VENOUS BLD VENIPUNCTURE: CPT

## 2025-04-12 PROCEDURE — 700111 HCHG RX REV CODE 636 W/ 250 OVERRIDE (IP): Mod: JZ,UD | Performed by: INTERNAL MEDICINE

## 2025-04-12 PROCEDURE — G0378 HOSPITAL OBSERVATION PER HR: HCPCS

## 2025-04-12 PROCEDURE — A9270 NON-COVERED ITEM OR SERVICE: HCPCS | Mod: UD | Performed by: STUDENT IN AN ORGANIZED HEALTH CARE EDUCATION/TRAINING PROGRAM

## 2025-04-12 PROCEDURE — 93010 ELECTROCARDIOGRAM REPORT: CPT | Performed by: INTERNAL MEDICINE

## 2025-04-12 PROCEDURE — 84484 ASSAY OF TROPONIN QUANT: CPT

## 2025-04-12 PROCEDURE — 83735 ASSAY OF MAGNESIUM: CPT

## 2025-04-12 PROCEDURE — 99233 SBSQ HOSP IP/OBS HIGH 50: CPT | Performed by: INTERNAL MEDICINE

## 2025-04-12 PROCEDURE — 96372 THER/PROPH/DIAG INJ SC/IM: CPT

## 2025-04-12 PROCEDURE — A9270 NON-COVERED ITEM OR SERVICE: HCPCS | Mod: UD | Performed by: INTERNAL MEDICINE

## 2025-04-12 RX ORDER — ISOSORBIDE MONONITRATE 30 MG/1
30 TABLET, EXTENDED RELEASE ORAL
Status: DISCONTINUED | OUTPATIENT
Start: 2025-04-13 | End: 2025-04-15 | Stop reason: HOSPADM

## 2025-04-12 RX ORDER — AMLODIPINE BESYLATE 5 MG/1
2.5 TABLET ORAL DAILY
Status: DISCONTINUED | OUTPATIENT
Start: 2025-04-13 | End: 2025-04-15 | Stop reason: HOSPADM

## 2025-04-12 RX ORDER — RANOLAZINE 500 MG/1
1000 TABLET, EXTENDED RELEASE ORAL 2 TIMES DAILY
Status: DISCONTINUED | OUTPATIENT
Start: 2025-04-12 | End: 2025-04-15 | Stop reason: HOSPADM

## 2025-04-12 RX ORDER — NITROGLYCERIN 0.4 MG/1
0.4 TABLET SUBLINGUAL
Status: COMPLETED | OUTPATIENT
Start: 2025-04-12 | End: 2025-04-14

## 2025-04-12 RX ORDER — HYDROMORPHONE HYDROCHLORIDE 1 MG/ML
0.5 INJECTION, SOLUTION INTRAMUSCULAR; INTRAVENOUS; SUBCUTANEOUS 2 TIMES DAILY PRN
Status: DISCONTINUED | OUTPATIENT
Start: 2025-04-12 | End: 2025-04-13

## 2025-04-12 RX ORDER — METOPROLOL SUCCINATE 25 MG/1
25 TABLET, EXTENDED RELEASE ORAL 2 TIMES DAILY
Status: DISCONTINUED | OUTPATIENT
Start: 2025-04-12 | End: 2025-04-15 | Stop reason: HOSPADM

## 2025-04-12 RX ADMIN — KETOROLAC TROMETHAMINE 15 MG: 15 INJECTION, SOLUTION INTRAMUSCULAR; INTRAVENOUS at 14:34

## 2025-04-12 RX ADMIN — GABAPENTIN 1200 MG: 400 CAPSULE ORAL at 20:55

## 2025-04-12 RX ADMIN — ZIPRASIDONE HYDROCHLORIDE 40 MG: 40 CAPSULE ORAL at 20:55

## 2025-04-12 RX ADMIN — RANOLAZINE 1000 MG: 500 TABLET, EXTENDED RELEASE ORAL at 18:16

## 2025-04-12 RX ADMIN — ENOXAPARIN SODIUM 40 MG: 100 INJECTION SUBCUTANEOUS at 05:52

## 2025-04-12 RX ADMIN — GABAPENTIN 1200 MG: 400 CAPSULE ORAL at 08:12

## 2025-04-12 RX ADMIN — METHOCARBAMOL 500 MG: 500 TABLET ORAL at 20:55

## 2025-04-12 RX ADMIN — KETOROLAC TROMETHAMINE 15 MG: 15 INJECTION, SOLUTION INTRAMUSCULAR; INTRAVENOUS at 18:14

## 2025-04-12 RX ADMIN — KETOROLAC TROMETHAMINE 15 MG: 15 INJECTION, SOLUTION INTRAMUSCULAR; INTRAVENOUS at 23:44

## 2025-04-12 RX ADMIN — METHOCARBAMOL 500 MG: 500 TABLET ORAL at 14:33

## 2025-04-12 RX ADMIN — LAMOTRIGINE 50 MG: 25 TABLET ORAL at 18:14

## 2025-04-12 RX ADMIN — LIDOCAINE 1 PATCH: 4 PATCH TOPICAL at 18:13

## 2025-04-12 RX ADMIN — METHOCARBAMOL 500 MG: 500 TABLET ORAL at 18:15

## 2025-04-12 RX ADMIN — GABAPENTIN 1200 MG: 400 CAPSULE ORAL at 14:33

## 2025-04-12 RX ADMIN — KETOROLAC TROMETHAMINE 15 MG: 15 INJECTION, SOLUTION INTRAMUSCULAR; INTRAVENOUS at 05:54

## 2025-04-12 RX ADMIN — METOPROLOL SUCCINATE 25 MG: 25 TABLET, EXTENDED RELEASE ORAL at 18:15

## 2025-04-12 RX ADMIN — OXYCODONE HYDROCHLORIDE 10 MG: 10 TABLET ORAL at 00:01

## 2025-04-12 RX ADMIN — METHOCARBAMOL 500 MG: 500 TABLET ORAL at 08:13

## 2025-04-12 RX ADMIN — OXYCODONE HYDROCHLORIDE 10 MG: 10 TABLET ORAL at 14:37

## 2025-04-12 RX ADMIN — HYDROMORPHONE HYDROCHLORIDE 0.5 MG: 1 INJECTION, SOLUTION INTRAMUSCULAR; INTRAVENOUS; SUBCUTANEOUS at 16:31

## 2025-04-12 RX ADMIN — OXYCODONE HYDROCHLORIDE 10 MG: 10 TABLET ORAL at 04:37

## 2025-04-12 RX ADMIN — OXYCODONE HYDROCHLORIDE 10 MG: 10 TABLET ORAL at 20:58

## 2025-04-12 RX ADMIN — DIBASIC SODIUM PHOSPHATE, MONOBASIC POTASSIUM PHOSPHATE AND MONOBASIC SODIUM PHOSPHATE 500 MG: 852; 155; 130 TABLET ORAL at 08:13

## 2025-04-12 RX ADMIN — NITROGLYCERIN 0.4 MG: 0.4 TABLET SUBLINGUAL at 08:12

## 2025-04-12 RX ADMIN — KETOROLAC TROMETHAMINE 15 MG: 15 INJECTION, SOLUTION INTRAMUSCULAR; INTRAVENOUS at 00:00

## 2025-04-12 RX ADMIN — LAMOTRIGINE 50 MG: 25 TABLET ORAL at 05:53

## 2025-04-12 RX ADMIN — HYDROMORPHONE HYDROCHLORIDE 0.5 MG: 1 INJECTION, SOLUTION INTRAMUSCULAR; INTRAVENOUS; SUBCUTANEOUS at 05:53

## 2025-04-12 RX ADMIN — ONDANSETRON 4 MG: 2 INJECTION INTRAMUSCULAR; INTRAVENOUS at 08:17

## 2025-04-12 RX ADMIN — OXYCODONE HYDROCHLORIDE 10 MG: 10 TABLET ORAL at 10:42

## 2025-04-12 ASSESSMENT — PAIN DESCRIPTION - PAIN TYPE
TYPE: ACUTE PAIN

## 2025-04-12 ASSESSMENT — ENCOUNTER SYMPTOMS
BACK PAIN: 1
MYALGIAS: 1

## 2025-04-12 NOTE — DISCHARGE PLANNING
[facility] Transitional Care Coordination    Referral from: Dr. Grant    Insurance Provider on Facesheet: MCR/BREANNE    Potential Rehab Diagnosis: Debility    Chart review indicates patient may have on going medical management and may have therapy needs to possibly meet inpatient rehab facility criteria with the goal of returning to community.    D/C support will need to be verified: Grandparent    Physiatry consultation pended per protocol.  IV Dilaudid is a barrier.  C.P. w/u pending.     Thank you for the referral.

## 2025-04-12 NOTE — PROGRESS NOTES
Pt complained of chest pain this AM. Pt states she is having vasospasms. Notified provider Jaswinder Coon. He ordered an STAT EKG. Continuing plan of care as ordered. Pt providing a complete list of home medications and Day RN to complete a med rec with patient this AM.

## 2025-04-12 NOTE — CARE PLAN
The patient is Stable - Low risk of patient condition declining or worsening    Shift Goals  Clinical Goals: pain will be within pt's pain comfort level. this shift  Patient Goals: pain to stop  Family Goals: JODI    Progress made toward(s) clinical / shift goals:   Pt 's pain has been controlled with monitoring and medication per MAR this shift. Non-pharmacological methods used: ice . Pt remained free from falls this shift. Fall prevention measures in place including treaded socks, area free from clutter, pt educated on call light use and call light within reach, hourly rounding, and pt educated on safety plan. Bed alarm in place.     Problem: Pain - Standard  Goal: Alleviation of pain or a reduction in pain to the patient’s comfort goal  Outcome: Progressing     Problem: Fall Risk  Goal: Patient will remain free from falls  Outcome: Progressing     Patient is not progressing towards the following goals:

## 2025-04-12 NOTE — PROGRESS NOTES
"Assumed care of patient at 1900 from day RN. Patient is A&O x 4. Bed locked in the lowest position, 2 side rails up, call light is within reach, belongings at bedside. Pt reports pain level of 5 /10 recently medicated. Mobility 2 assist. Pt recently medicated and resting in bed, no signs of distress. Explained plan of care and safety precautions to pt, pt has no questions at this time. Hourly rounding is in place.   /72   Pulse 71   Temp 36.2 °C (97.1 °F) (Temporal)   Resp 18   Ht 1.626 m (5' 4\")   Wt 117 kg (258 lb)   SpO2 96%   BMI 44.29 kg/m²   "

## 2025-04-12 NOTE — PROGRESS NOTES
[facility] Medicine Daily Progress Note    Date of Service  4/12/2025    Chief Complaint  Kristin Balderrama is a 34 y.o. female admitted 4/10/2025 with   Chief Complaint   Patient presents with    Back Pain     This morning around 0800        Hospital Course  Kristin Balderrama is a 34 y.o. female who presented 4/10/2025 with severe low back pain.      Patient has a history of degenerative disc disease status post spinal surgery in 2010, Ehler-Danlos syndrome, inappropriate sinus tachycardia, morbid obesity, chronic urinary incontinence.  She was lifting some water bottles around today, when she felt a sudden pop in her lower back that she states that was above her L4 level.  Since then, she has had severe low back pain with shooting pain coming down her lower extremities.  She endorses numbness in her groin area and has not been able to urinate for about 7 hours.  She  was brought to the ER for further evaluation.     In ER, CBC/CMP unremarkable. Bladder scan of 190 ml.  Stat MRI lumbar spine without contrast performed showing no spinal or neuroforaminal stenosis noted.  Patient will be admitted for pain management and physical therapy.    Interval Problem Update  Patient was seen and examined at bedside.  I have personally reviewed and interpreted vitals, labs, and imaging.    4/11.  Afebrile.  Intermittent bradycardia.  Intermittent hypotension.  On room air.  Denies any fever, chills, chest pains, shortness of breath.  Complains of severe lower back pain.  PT/OT recommended postacute placement.  Consulted inpatient rehab.  Started on muscle relaxants with methocarbamol, Lidoderm patch.  Already on high-dose gabapentin.  No signs of stenosis or herniation on MRI lumbar.  Patient does report having to push down on her bladder in order to pee when previously she used to be incontinent.  She has some multiple allergies including to sulfa and tamsulosin.  Will continue to monitor bowel and bladder function.   Ordered bladder scan protocol.  Patient reports decreased appetite with solids and liquids, fatigue, weakness.  Though she is swallowing.  Evaluated by nutrition and does not meet criteria for malnutrition.  ESR 11  CRP 0.6  4/12.  Afebrile.  Stable vitals.  On room air.  Denies any fevers, chills.  She had some chest pain this morning.  States she had chest pain rating to the back of her scapula and upper neck.  States she gets this chronically from vasospasms and request to be restarted on her long-acting Imdur and sublingual nitroglycerin as well as Ranexa.  EKG was personally reviewed and interpreted with no acute ischemic changes..  Chest pain has resolved.  Still having severe back pain.  Reports chronically decreased appetite.  Reports she was too tired to eat breakfast and then was nauseous during lunch.  Adjusted pain meds.  Wbc 4.4  P 147    I have discussed this patient's plan of care and discharge plan at IDT rounds today with Case Management, Nursing, Nursing leadership, and other members of the IDT team.    Consultants/Specialty  None    Code Status  Full Code    Disposition  The patient is not medically cleared for discharge to home or a post-acute facility.  Anticipate discharge to: skilled nursing facility    I have placed the appropriate orders for post-discharge needs.    Review of Systems  Review of Systems   Constitutional:  Positive for malaise/fatigue.   Musculoskeletal:  Positive for back pain and myalgias.        Physical Exam  Temp:  [36.2 °C (97.1 °F)-36.6 °C (97.8 °F)] 36.6 °C (97.8 °F)  Pulse:  [62-77] 65  Resp:  [16-18] 18  BP: (111-129)/(55-75) 111/55  SpO2:  [92 %-96 %] 92 %    Physical Exam  Vitals and nursing note reviewed.   Constitutional:       Appearance: Normal appearance. She is obese. She is ill-appearing.   HENT:      Head: Normocephalic and atraumatic.      Right Ear: External ear normal.      Left Ear: External ear normal.      Nose: Nose normal.      Mouth/Throat:       Mouth: Mucous membranes are moist.      Pharynx: Oropharynx is clear. No oropharyngeal exudate or posterior oropharyngeal erythema.   Eyes:      Extraocular Movements: Extraocular movements intact.      Conjunctiva/sclera: Conjunctivae normal.   Cardiovascular:      Rate and Rhythm: Normal rate and regular rhythm.      Pulses: Normal pulses.      Heart sounds: Normal heart sounds. No murmur heard.  Pulmonary:      Effort: Pulmonary effort is normal. No respiratory distress.      Breath sounds: Normal breath sounds. No stridor. No wheezing or rales.   Abdominal:      General: Abdomen is flat. Bowel sounds are normal. There is no distension.      Palpations: Abdomen is soft. There is no mass.      Tenderness: There is no abdominal tenderness.   Musculoskeletal:         General: Tenderness present.      Cervical back: Normal range of motion.   Skin:     General: Skin is warm.      Capillary Refill: Capillary refill takes less than 2 seconds.   Neurological:      General: No focal deficit present.      Mental Status: She is alert and oriented to person, place, and time. Mental status is at baseline.      Cranial Nerves: No cranial nerve deficit.   Psychiatric:         Mood and Affect: Mood normal.         Behavior: Behavior normal.         Fluids    Intake/Output Summary (Last 24 hours) at 4/12/2025 0635  Last data filed at 4/11/2025 2200  Gross per 24 hour   Intake 400 ml   Output 600 ml   Net -200 ml        Laboratory  Recent Labs     04/10/25  1055 04/11/25  0019   WBC 6.8 6.1   RBC 4.33 4.36   HEMOGLOBIN 14.0 13.6   HEMATOCRIT 40.6 41.1   MCV 93.8 94.3   MCH 32.3 31.2   MCHC 34.5 33.1   RDW 43.8 43.1   PLATELETCT 173 166   MPV 11.9 11.7     Recent Labs     04/10/25  1055 04/11/25  0019   SODIUM 138 138   POTASSIUM 4.3 3.9   CHLORIDE 109 110   CO2 16* 18*   GLUCOSE 137* 105*   BUN 14 12   CREATININE 1.00 0.92   CALCIUM 9.2 8.8                   Imaging  MR-LUMBAR SPINE-W/O   Final Result      1.  Postsurgical changes  as described above.   2.  There is no spinal or neural foraminal stenosis. There has been no significant interval change.           Assessment/Plan  * Low back pain- (present on admission)  Assessment & Plan  4/12/2025  Patient with history of degenerative disc disease with spinal fusion in the past presents for acute onset low back pain, numbness in her groin area, urinary incontinence  Stat MRI lumbar spine was performed, no spinal or neuroforaminal stenosis appreciated  For now, admit patient overnight for pain management  PT OT    Hypertension  Assessment & Plan  4/12/2025  Resume home medications    Morbid obesity (HCC)  Assessment & Plan  Body mass index is 44.29 kg/m².  Counseled about diet and exercise    Annetta-Danlos syndrome- (present on admission)  Assessment & Plan  4/12/2025  History of    Schizoaffective disorder, depressive type (HCC)- (present on admission)  Assessment & Plan  4/12/2025  Continue home medications         VTE prophylaxis: Lovenox    I have performed a physical exam and reviewed and updated ROS and Plan today (4/12/2025). In review of yesterday's note (4/11/2025), there are no changes except as documented above.    Greater than 51 minutes spent prepping to see patient (e.g. review of tests) obtaining and/or reviewing separately obtained history. Performing a medically appropriate examination and/ evaluation.  Counseling and educating the patient/family/caregiver.  Ordering medications, tests, or procedures.  Referring and communicating with other health care professionals.  Documenting clinical information in EPIC.  Independently interpreting results and communicating results to patient/family/caregiver.  Care coordination.

## 2025-04-13 LAB
ALBUMIN SERPL BCP-MCNC: 3.8 G/DL (ref 3.2–4.9)
BUN SERPL-MCNC: 7 MG/DL (ref 8–22)
CALCIUM ALBUM COR SERPL-MCNC: 9 MG/DL (ref 8.5–10.5)
CALCIUM SERPL-MCNC: 8.8 MG/DL (ref 8.5–10.5)
CHLORIDE SERPL-SCNC: 113 MMOL/L (ref 96–112)
CO2 SERPL-SCNC: 20 MMOL/L (ref 20–33)
CREAT SERPL-MCNC: 0.64 MG/DL (ref 0.5–1.4)
ERYTHROCYTE [DISTWIDTH] IN BLOOD BY AUTOMATED COUNT: 43.1 FL (ref 35.9–50)
GFR SERPLBLD CREATININE-BSD FMLA CKD-EPI: 118 ML/MIN/1.73 M 2
GLUCOSE SERPL-MCNC: 155 MG/DL (ref 65–99)
HCT VFR BLD AUTO: 39.8 % (ref 37–47)
HGB BLD-MCNC: 13.5 G/DL (ref 12–16)
MAGNESIUM SERPL-MCNC: 1.9 MG/DL (ref 1.5–2.5)
MCH RBC QN AUTO: 31.8 PG (ref 27–33)
MCHC RBC AUTO-ENTMCNC: 33.9 G/DL (ref 32.2–35.5)
MCV RBC AUTO: 93.9 FL (ref 81.4–97.8)
PHOSPHATE SERPL-MCNC: 2.7 MG/DL (ref 2.5–4.5)
PLATELET # BLD AUTO: 146 K/UL (ref 164–446)
PMV BLD AUTO: 12 FL (ref 9–12.9)
POTASSIUM SERPL-SCNC: 3.8 MMOL/L (ref 3.6–5.5)
RBC # BLD AUTO: 4.24 M/UL (ref 4.2–5.4)
SODIUM SERPL-SCNC: 140 MMOL/L (ref 135–145)
WBC # BLD AUTO: 4 K/UL (ref 4.8–10.8)

## 2025-04-13 PROCEDURE — G0378 HOSPITAL OBSERVATION PER HR: HCPCS

## 2025-04-13 PROCEDURE — 700111 HCHG RX REV CODE 636 W/ 250 OVERRIDE (IP): Mod: JZ,UD | Performed by: INTERNAL MEDICINE

## 2025-04-13 PROCEDURE — A9270 NON-COVERED ITEM OR SERVICE: HCPCS | Mod: UD | Performed by: STUDENT IN AN ORGANIZED HEALTH CARE EDUCATION/TRAINING PROGRAM

## 2025-04-13 PROCEDURE — 700111 HCHG RX REV CODE 636 W/ 250 OVERRIDE (IP): Mod: JZ | Performed by: STUDENT IN AN ORGANIZED HEALTH CARE EDUCATION/TRAINING PROGRAM

## 2025-04-13 PROCEDURE — 99215 OFFICE O/P EST HI 40 MIN: CPT | Performed by: PHYSICAL MEDICINE & REHABILITATION

## 2025-04-13 PROCEDURE — 80069 RENAL FUNCTION PANEL: CPT

## 2025-04-13 PROCEDURE — 83735 ASSAY OF MAGNESIUM: CPT

## 2025-04-13 PROCEDURE — 36415 COLL VENOUS BLD VENIPUNCTURE: CPT

## 2025-04-13 PROCEDURE — 96376 TX/PRO/DX INJ SAME DRUG ADON: CPT

## 2025-04-13 PROCEDURE — 700102 HCHG RX REV CODE 250 W/ 637 OVERRIDE(OP): Mod: UD | Performed by: INTERNAL MEDICINE

## 2025-04-13 PROCEDURE — 700101 HCHG RX REV CODE 250: Mod: UD | Performed by: INTERNAL MEDICINE

## 2025-04-13 PROCEDURE — A9270 NON-COVERED ITEM OR SERVICE: HCPCS | Mod: UD | Performed by: PHYSICAL MEDICINE & REHABILITATION

## 2025-04-13 PROCEDURE — 85027 COMPLETE CBC AUTOMATED: CPT

## 2025-04-13 PROCEDURE — A9270 NON-COVERED ITEM OR SERVICE: HCPCS | Mod: UD | Performed by: INTERNAL MEDICINE

## 2025-04-13 PROCEDURE — 99233 SBSQ HOSP IP/OBS HIGH 50: CPT | Performed by: INTERNAL MEDICINE

## 2025-04-13 PROCEDURE — 96372 THER/PROPH/DIAG INJ SC/IM: CPT

## 2025-04-13 PROCEDURE — 700102 HCHG RX REV CODE 250 W/ 637 OVERRIDE(OP): Mod: UD | Performed by: STUDENT IN AN ORGANIZED HEALTH CARE EDUCATION/TRAINING PROGRAM

## 2025-04-13 PROCEDURE — 700102 HCHG RX REV CODE 250 W/ 637 OVERRIDE(OP): Mod: UD | Performed by: PHYSICAL MEDICINE & REHABILITATION

## 2025-04-13 RX ORDER — ACETAMINOPHEN 325 MG/1
650 TABLET ORAL EVERY 6 HOURS
Status: DISCONTINUED | OUTPATIENT
Start: 2025-04-13 | End: 2025-04-15 | Stop reason: HOSPADM

## 2025-04-13 RX ORDER — LANOLIN ALCOHOL/MO/W.PET/CERES
400 CREAM (GRAM) TOPICAL 2 TIMES DAILY
Status: COMPLETED | OUTPATIENT
Start: 2025-04-13 | End: 2025-04-13

## 2025-04-13 RX ORDER — POTASSIUM CHLORIDE 1500 MG/1
20 TABLET, EXTENDED RELEASE ORAL ONCE
Status: COMPLETED | OUTPATIENT
Start: 2025-04-13 | End: 2025-04-13

## 2025-04-13 RX ORDER — AMOXICILLIN 250 MG
1 CAPSULE ORAL DAILY
Status: DISCONTINUED | OUTPATIENT
Start: 2025-04-13 | End: 2025-04-15 | Stop reason: HOSPADM

## 2025-04-13 RX ADMIN — ZIPRASIDONE HYDROCHLORIDE 40 MG: 40 CAPSULE ORAL at 21:52

## 2025-04-13 RX ADMIN — METHOCARBAMOL 500 MG: 500 TABLET ORAL at 18:35

## 2025-04-13 RX ADMIN — KETOROLAC TROMETHAMINE 15 MG: 15 INJECTION, SOLUTION INTRAMUSCULAR; INTRAVENOUS at 12:38

## 2025-04-13 RX ADMIN — LIDOCAINE 1 PATCH: 4 PATCH TOPICAL at 17:39

## 2025-04-13 RX ADMIN — KETOROLAC TROMETHAMINE 15 MG: 15 INJECTION, SOLUTION INTRAMUSCULAR; INTRAVENOUS at 05:09

## 2025-04-13 RX ADMIN — ENOXAPARIN SODIUM 40 MG: 100 INJECTION SUBCUTANEOUS at 05:10

## 2025-04-13 RX ADMIN — Medication 400 MG: at 06:23

## 2025-04-13 RX ADMIN — METHOCARBAMOL 500 MG: 500 TABLET ORAL at 10:14

## 2025-04-13 RX ADMIN — RANOLAZINE 1000 MG: 500 TABLET, EXTENDED RELEASE ORAL at 18:34

## 2025-04-13 RX ADMIN — METOPROLOL SUCCINATE 25 MG: 25 TABLET, EXTENDED RELEASE ORAL at 17:34

## 2025-04-13 RX ADMIN — METHOCARBAMOL 500 MG: 500 TABLET ORAL at 21:51

## 2025-04-13 RX ADMIN — GABAPENTIN 1200 MG: 400 CAPSULE ORAL at 21:52

## 2025-04-13 RX ADMIN — OXYCODONE HYDROCHLORIDE 10 MG: 10 TABLET ORAL at 12:39

## 2025-04-13 RX ADMIN — LAMOTRIGINE 50 MG: 25 TABLET ORAL at 05:10

## 2025-04-13 RX ADMIN — DIBASIC SODIUM PHOSPHATE, MONOBASIC POTASSIUM PHOSPHATE AND MONOBASIC SODIUM PHOSPHATE 500 MG: 852; 155; 130 TABLET ORAL at 06:22

## 2025-04-13 RX ADMIN — ACETAMINOPHEN 650 MG: 325 TABLET, FILM COATED ORAL at 18:34

## 2025-04-13 RX ADMIN — LAMOTRIGINE 50 MG: 25 TABLET ORAL at 17:34

## 2025-04-13 RX ADMIN — GABAPENTIN 1200 MG: 400 CAPSULE ORAL at 17:34

## 2025-04-13 RX ADMIN — AMLODIPINE BESYLATE 2.5 MG: 5 TABLET ORAL at 05:11

## 2025-04-13 RX ADMIN — Medication 400 MG: at 17:34

## 2025-04-13 RX ADMIN — POTASSIUM CHLORIDE 20 MEQ: 1500 TABLET, EXTENDED RELEASE ORAL at 06:23

## 2025-04-13 RX ADMIN — RANOLAZINE 1000 MG: 500 TABLET, EXTENDED RELEASE ORAL at 06:23

## 2025-04-13 RX ADMIN — METOPROLOL SUCCINATE 25 MG: 25 TABLET, EXTENDED RELEASE ORAL at 05:11

## 2025-04-13 RX ADMIN — SENNOSIDES AND DOCUSATE SODIUM 1 TABLET: 50; 8.6 TABLET ORAL at 18:34

## 2025-04-13 RX ADMIN — METHOCARBAMOL 500 MG: 500 TABLET ORAL at 12:38

## 2025-04-13 RX ADMIN — GABAPENTIN 1200 MG: 400 CAPSULE ORAL at 10:14

## 2025-04-13 RX ADMIN — ISOSORBIDE MONONITRATE 30 MG: 30 TABLET, EXTENDED RELEASE ORAL at 10:14

## 2025-04-13 ASSESSMENT — PATIENT HEALTH QUESTIONNAIRE - PHQ9
2. FEELING DOWN, DEPRESSED, IRRITABLE, OR HOPELESS: MORE THAN HALF THE DAYS
SUM OF ALL RESPONSES TO PHQ QUESTIONS 1-9: 11
1. LITTLE INTEREST OR PLEASURE IN DOING THINGS: MORE THAN HALF THE DAYS
SUM OF ALL RESPONSES TO PHQ9 QUESTIONS 1 AND 2: 4
7. TROUBLE CONCENTRATING ON THINGS, SUCH AS READING THE NEWSPAPER OR WATCHING TELEVISION: NEARLY EVERY DAY
5. POOR APPETITE OR OVEREATING: MORE THAN HALF THE DAYS
3. TROUBLE FALLING OR STAYING ASLEEP OR SLEEPING TOO MUCH: NOT AT ALL
6. FEELING BAD ABOUT YOURSELF - OR THAT YOU ARE A FAILURE OR HAVE LET YOURSELF OR YOUR FAMILY DOWN: NOT AL ALL
8. MOVING OR SPEAKING SO SLOWLY THAT OTHER PEOPLE COULD HAVE NOTICED. OR THE OPPOSITE, BEING SO FIGETY OR RESTLESS THAT YOU HAVE BEEN MOVING AROUND A LOT MORE THAN USUAL: NOT AT ALL
9. THOUGHTS THAT YOU WOULD BE BETTER OFF DEAD, OR OF HURTING YOURSELF: NOT AT ALL
4. FEELING TIRED OR HAVING LITTLE ENERGY: MORE THAN HALF THE DAYS

## 2025-04-13 ASSESSMENT — PAIN DESCRIPTION - PAIN TYPE
TYPE: ACUTE PAIN

## 2025-04-13 ASSESSMENT — ENCOUNTER SYMPTOMS
BACK PAIN: 1
MYALGIAS: 1

## 2025-04-13 NOTE — CARE PLAN
The patient is Stable - Low risk of patient condition declining or worsening    Shift Goals  Clinical Goals: Pain will be controlled at 6/10 or less throughout the night  Patient Goals: Sleep comfortably; pain control  Family Goals: JODI    Progress made toward(s) clinical / shift goals:      Problem: Pain - Standard  Goal: Alleviation of pain or a reduction in pain to the patient’s comfort goal  Outcome: Progressing     Problem: Skin Integrity  Goal: Skin integrity is maintained or improved  Outcome: Progressing     Problem: Fall Risk  Goal: Patient will remain free from falls  Outcome: Progressing     Patient reported a reduction in pain to 5/10 after pharmacological intervention (see MAR). Patient has remained free from falls throughout the night. Bed alarm on, bed locked/lowest position, and call light with all belongings within reach. Patient understands the need to call when getting out of bed and has been calling appropriately.     Patient is not progressing towards the following goals:

## 2025-04-13 NOTE — PROGRESS NOTES
[facility] Medicine Daily Progress Note    Date of Service  4/13/2025    Chief Complaint  Kristin Balderrama is a 34 y.o. female admitted 4/10/2025 with   Chief Complaint   Patient presents with    Back Pain     This morning around 0800        Hospital Course  Kristin Balderrama is a 34 y.o. female who presented 4/10/2025 with severe low back pain.      Patient has a history of degenerative disc disease status post spinal surgery in 2010, Ehler-Danlos syndrome, inappropriate sinus tachycardia, morbid obesity, chronic urinary incontinence.  She was lifting some water bottles around today, when she felt a sudden pop in her lower back that she states that was above her L4 level.  Since then, she has had severe low back pain with shooting pain coming down her lower extremities.  She endorses numbness in her groin area and has not been able to urinate for about 7 hours.  She  was brought to the ER for further evaluation.     In ER, CBC/CMP unremarkable. Bladder scan of 190 ml.  Stat MRI lumbar spine without contrast performed showing no spinal or neuroforaminal stenosis noted.  Patient will be admitted for pain management and physical therapy.    Interval Problem Update  Patient was seen and examined at bedside.  I have personally reviewed and interpreted vitals, labs, and imaging.    4/11.  Afebrile.  Intermittent bradycardia.  Intermittent hypotension.  On room air.  Denies any fever, chills, chest pains, shortness of breath.  Complains of severe lower back pain.  PT/OT recommended postacute placement.  Consulted inpatient rehab.  Started on muscle relaxants with methocarbamol, Lidoderm patch.  Already on high-dose gabapentin.  No signs of stenosis or herniation on MRI lumbar.  Patient does report having to push down on her bladder in order to pee when previously she used to be incontinent.  She has some multiple allergies including to sulfa and tamsulosin.  Will continue to monitor bowel and bladder function.   Ordered bladder scan protocol.  Patient reports decreased appetite with solids and liquids, fatigue, weakness.  Though she is swallowing.  Evaluated by nutrition and does not meet criteria for malnutrition.  ESR 11  CRP 0.6  4/12.  Afebrile.  Stable vitals.  On room air.  Denies any fevers, chills.  She had some chest pain this morning.  States she had chest pain rating to the back of her scapula and upper neck.  States she gets this chronically from vasospasms and request to be restarted on her long-acting Imdur and sublingual nitroglycerin as well as Ranexa.  EKG was personally reviewed and interpreted with no acute ischemic changes..  Chest pain has resolved.  Still having severe back pain.  Reports chronically decreased appetite.  Reports she was too tired to eat breakfast and then was nauseous during lunch.  Adjusted pain meds.  4/13.  Afebrile.  Intermittent hypertension.  On room air.  Replete magnesium, phosphorus, potassium.  Denies fevers, chills.  Reports transient chest pain last night which resolved spontaneously.  Back pain is stable.  Abdominal yet but states she is passing gas.  Having better control of her bladder.  Being evaluated for inpatient rehab.  Wbc 4.4 > 4  P 147 > 146    I have discussed this patient's plan of care and discharge plan at IDT rounds today with Case Management, Nursing, Nursing leadership, and other members of the IDT team.    Consultants/Specialty  None    Code Status  Full Code    Disposition  The patient is not medically cleared for discharge to home or a post-acute facility.  Anticipate discharge to: skilled nursing facility    I have placed the appropriate orders for post-discharge needs.    Review of Systems  Review of Systems   Constitutional:  Positive for malaise/fatigue.   Musculoskeletal:  Positive for back pain and myalgias.        Physical Exam  Temp:  [35.9 °C (96.6 °F)-36.2 °C (97.2 °F)] 36.2 °C (97.1 °F)  Pulse:  [63-82] 76  Resp:  [16-18] 16  BP:  (123-152)/(64-79) 123/64  SpO2:  [93 %-96 %] 93 %    Physical Exam  Vitals and nursing note reviewed.   Constitutional:       Appearance: Normal appearance. She is obese. She is ill-appearing.   HENT:      Head: Normocephalic and atraumatic.      Right Ear: External ear normal.      Left Ear: External ear normal.      Nose: Nose normal.      Mouth/Throat:      Mouth: Mucous membranes are moist.      Pharynx: Oropharynx is clear. No oropharyngeal exudate or posterior oropharyngeal erythema.   Eyes:      Extraocular Movements: Extraocular movements intact.      Conjunctiva/sclera: Conjunctivae normal.   Cardiovascular:      Rate and Rhythm: Normal rate and regular rhythm.      Pulses: Normal pulses.      Heart sounds: Normal heart sounds. No murmur heard.  Pulmonary:      Effort: Pulmonary effort is normal. No respiratory distress.      Breath sounds: Normal breath sounds. No stridor. No wheezing or rales.   Abdominal:      General: Abdomen is flat. Bowel sounds are normal. There is no distension.      Palpations: Abdomen is soft. There is no mass.      Tenderness: There is no abdominal tenderness.   Musculoskeletal:         General: Tenderness present.      Cervical back: Normal range of motion.   Skin:     General: Skin is warm.      Capillary Refill: Capillary refill takes less than 2 seconds.   Neurological:      General: No focal deficit present.      Mental Status: She is alert and oriented to person, place, and time. Mental status is at baseline.      Cranial Nerves: No cranial nerve deficit.   Psychiatric:         Mood and Affect: Mood normal.         Behavior: Behavior normal.         Fluids    Intake/Output Summary (Last 24 hours) at 4/13/2025 0552  Last data filed at 4/13/2025 0000  Gross per 24 hour   Intake 700 ml   Output 1 ml   Net 699 ml        Laboratory  Recent Labs     04/11/25  0019 04/12/25  0627 04/13/25  0041   WBC 6.1 4.4* 4.0*   RBC 4.36 4.81 4.24   HEMOGLOBIN 13.6 15.0 13.5   HEMATOCRIT 41.1  46.6 39.8   MCV 94.3 96.9 93.9   MCH 31.2 31.2 31.8   MCHC 33.1 32.2 33.9   RDW 43.1 44.2 43.1   PLATELETCT 166 147* 146*   MPV 11.7 11.5 12.0     Recent Labs     04/11/25  0019 04/12/25  0627 04/13/25  0041   SODIUM 138 138 140   POTASSIUM 3.9 4.2 3.8   CHLORIDE 110 113* 113*   CO2 18* 15* 20   GLUCOSE 105* 112* 155*   BUN 12 9 7*   CREATININE 0.92 0.78 0.64   CALCIUM 8.8 9.1 8.8                   Imaging  MR-LUMBAR SPINE-W/O   Final Result      1.  Postsurgical changes as described above.   2.  There is no spinal or neural foraminal stenosis. There has been no significant interval change.           Assessment/Plan  * Low back pain- (present on admission)  Assessment & Plan  4/13/2025  Patient with history of degenerative disc disease with spinal fusion in the past presents for acute onset low back pain, numbness in her groin area, urinary incontinence  Stat MRI lumbar spine was performed, no spinal or neuroforaminal stenosis appreciated  For now, admit patient overnight for pain management  PT OT recommend post acute    Hypertension  Assessment & Plan  4/13/2025  Resume home medications    Morbid obesity (HCC)  Assessment & Plan  Body mass index is 44.29 kg/m².  Counseled about diet and exercise    Annetta-Danlos syndrome- (present on admission)  Assessment & Plan  4/13/2025  History of    Schizoaffective disorder, depressive type (HCC)- (present on admission)  Assessment & Plan  4/13/2025  Continue home medications         VTE prophylaxis: Lovenox    I have performed a physical exam and reviewed and updated ROS and Plan today (4/13/2025). In review of yesterday's note (4/12/2025), there are no changes except as documented above.    Greater than 50 minutes spent prepping to see patient (e.g. review of tests) obtaining and/or reviewing separately obtained history. Performing a medically appropriate examination and/ evaluation.  Counseling and educating the patient/family/caregiver.  Ordering medications, tests, or  procedures.  Referring and communicating with other health care professionals.  Documenting clinical information in EPIC.  Independently interpreting results and communicating results to patient/family/caregiver.  Care coordination.

## 2025-04-13 NOTE — CONSULTS
Physical Medicine and Rehabilitation Consultation              Date of initial consultation: 4/13/2025  Requesting provider: Noman Grant DO    Consulting provider: Terri Connell D.O.  Reason for consultation: assess for acute inpatient rehab appropriateness  LOS: 0 Day(s)    Chief complaint: Back pain    HPI: The patient is a 34 y.o.  female with a past medical history of Annetta-Danlos syndrome, schizoaffective disorder disorder, borderline personality disorder, morbid obesity, prior hip [facility])  chronic urinary incontinence,  history of degenerative disc disease status post spinal surgery in 2010;  who presented on 4/10/2025  9:08 AM with severe back pain.  Per documentation, patient reported lifting water bottles and suddenly felt a pop in her low back.  She endorsed difficulty urinating after onset of pain.  Upon evaluation in the emergency department, bladder scan showed 190 mL.  Stat MRI lumbar spine showed no spinal or foraminal stenosis.  Patient was admitted for pain management.  Patient's hospital course has been notable for intermittent hypotension and had an episode of chest pain on 4/12.  EKG with no ischemic changes.  Patient has required scheduled Robaxin and IV Toradol for pain control.  She has not used any as needed Tylenol.  She is on her home dose of gabapentin 1200 mg 3 times daily.    Patient seen and examined at bedside, patient reports her pain is improving. She is now able to tolerate getting to chair. Reports pain with standing and ambulating that is a shooting pain down her back. Denies radicular pain. Reports she is on gabapentin at home, was previously on lyrica, but was not able to stay on it because she had a hard time getting a provider to prescribe it for her in the outpatient setting.      Social Hx:  Patient lives with grandmother and aunt in a one-story house with ramp access  0 DESTINY  At prior level of function patient was Independent with  mobility and ADLs.       Tobacco: Denied  Alcohol: Denied  Drugs: Denied    THERAPY:  Restrictions: Fall risk  PT: Functional mobility   4/11 min assist bed mobility, mod assist sit to stand, unable to participate in gait    OT: ADLs  4/11 min assist bed mobility, max assist lower body dressing, mod assist sit to stand    SLP:   None    IMAGING:  MR-LUMBAR SPINE-W/O  Narrative: 4/10/2025 4:43 PM    HISTORY/REASON FOR EXAM:  BACK PAIN.    TECHNIQUE/EXAM DESCRIPTION:  MRI of the lumbar spine without contrast.    Multiplanar multisequence MR examination of the lumbar spine.    COMPARISON:  1/16/2024    FINDINGS:  There are postsurgical changes as evidenced by anterior fusion of L4 and L5 with L4-5 disc prosthesis. There is no aggressive osseous lesion. There is no perivertebral, disc epidural fluid collection.    At the level of L5-S1,there is bilateral facet joint arthropathy. There is no spinal or neural foraminal stenosis.    At the level of L4-5,there is no spinal or neural foraminal stenosis.    At the level of L3-4,there is no spinal or neural foraminal stenosis. Mild facet joint arthropathy seen.    At the level of the L2-3,there is no spinal or neural foraminal stenosis.    At the level of L1-2,there is no spinal or neural foraminal stenosis.    The conus terminates at the level of L1. The visualized lower thoracic spinal cord is unremarkable. The visualized lumbar nerve roots are unremarkable.    The visualized pre-and paraspinal soft tissues appear normal.  Impression: 1.  Postsurgical changes as described above.  2.  There is no spinal or neural foraminal stenosis. There has been no significant interval change.        PROCEDURES:  None    PMH:  Past Medical History:   Diagnosis Date    Abdominal pain     Anginal syndrome     Random chest pain especially with tachycardia    Apnea, sleep     Arthritis     ASTHMA     when around smoke    Back pain     Borderline personality disorder (HCC)     Chickenpox      "Chronic UTI 09/18/2010    Daytime sleepiness     Dental disorder 03/08/2021    Infected tooth    Depression     Diabetes (HCC)     Diarrhea     Incontinece    Disorder of thyroid     Hashimoto's    Fatigue     Frequent headaches     Heart burn     History of falling     Inappropriate sinus tachycardia (HCC) 2016    Statusp post ablation by Dr. Kumar.    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    Urinary bladder disorder     Suprapubic cath. 2/8/22: Not anymore.     Urinary incontinence     Weakness     Wears glasses        PSH:  Past Surgical History:   Procedure Laterality Date    IN CYSTOSCOPY,INSERT URETERAL STENT Right 2/12/2024    Procedure: CYSTOSCOPY, WITH RIGHT URETEROSCOPY, WITH LITHOTRIPSY, WITH INSERTION OF RIGHT URETERAL STENT;  Surgeon: Josafat Roberson M.D.;  Location: [facility];  Service: Urology    IN CYSTO/URETERO/PYELOSCOPY, DX Right 2/12/2024    Procedure: URETEROSCOPY;  Surgeon: Josafat Roberson M.D.;  Location: [facility];  Service: Urology    LASERTRIPSY Right 2/12/2024    Procedure: LITHOTRIPSY, USING LASER;  Surgeon: Josafat Roberson M.D.;  Location: [facility];  Service: Urology    LAPAROSCOPIC INGUINAL HERNIA REPAIR Right 07/21/2023    Procedure: LAPAROSCOPIC RIGHT INGUINAL HERNIA REPAIR WITH MESH;  Surgeon: Joe Noyola M.D.;  Location: [facility];  Service: General    " HERNIA REPAIR Right 07/21/2023    PB PERCUT FIX PBOX/NECK FEMUR FX Left 01/28/2023    Procedure: FIXATION, HIP, USING CANNULATED SCREW;  Surgeon: Noman Abdul M.D.;  Location: SURGERY [facility];  Service: Orthopedics    PA LAP,DIAGNOSTIC ABDOMEN  02/14/2022    Procedure: LAPAROSCOPY;  Surgeon: Seamus Pisano M.D.;  Location: SURGERY SAME DAY [facility];  Service: Gynecology    OVARIAN CYSTECTOMY Right 02/14/2022    Procedure: EXCISION, CYST, OVARY;  Surgeon: Seamus Pisano M.D.;  Location: SURGERY SAME DAY [facility];  Service: Gynecology    BOWEL STIMULATOR INSERTION  03/10/2021    Procedure: INSERTION, ELECTRODE LEADS AND PULSE GENERATOR, NEUROSTIMULATOR, SACRAL - REMOVAL OF INTERSTIM WITH REPLACEMENT OF SACRAL NEUROMODULATION DEVICE;  Surgeon: Joe Noyola M.D.;  Location: [facility];  Service: General    MUSCLE BIOPSY Right 01/26/2017    Procedure: MUSCLE BIOPSY - THIGH;  Surgeon: Isidro Vigil M.D.;  Location: [facility];  Service:     GASTROSCOPY WITH BALLOON DILATATION N/A 01/04/2017    Procedure: GASTROSCOPY WITH DILATATION;  Surgeon: Torres Vargas M.D.;  Location: [facility];  Service:     BOWEL STIMULATOR INSERTION  07/13/2016    Procedure: BOWEL STIMULATOR INSERTION FOR PERMANENT INTERSTIM SACRAL IMPLANT;  Surgeon: Joe Noyola M.D.;  Location: [facility];  Service:     RECOVERY  01/27/2016    Procedure: CATH LAB EP STUDY WITH SINUS NODE MODIFICATION ABHINAV;  Surgeon: Recoveryonly Surgery;  Location: SURGERY PRE-POST PROC UNIT [facility];  Service:     OTHER CARDIAC SURGERY  01/2016    cardiac ablation    NEURO DEST FACET L/S W/IG SNGL  04/21/2015    Performed by Reza Tabor at [facility] ORS    LUMBAR FUSION ANTERIOR  08/21/2012    Performed by SUSIE SAWANT at [facility] ORS    ALYSSA BY LAPAROSCOPY  08/29/2010    Performed by SAHYY JOHNS at [facility] ORS    LAMINOTOMY      OTHER ABDOMINAL SURGERY       TONSILLECTOMY      tonsillectomy       FHX:  Non-pertinent to today's issues  Family History   Problem Relation Age of Onset    Hypertension Mother     Sleep Apnea Mother     Heart Disease Mother     Other Mother         hypothryod    No Known Problems Sister     Other Sister         Narcolepsy;fibromyalsia;bone;nerve    Genitourinary () Problems Sister         endometriosis    Hypertension Maternal Uncle     Heart Disease Maternal Grandmother     Hypertension Maternal Grandmother     Cancer Neg Hx        Medications:  Current Facility-Administered Medications   Medication Dose    magnesium oxide tablet 400 mg  400 mg    nitroglycerin (Nitrostat) tablet 0.4 mg  0.4 mg    amLODIPine (Norvasc) tablet 2.5 mg  2.5 mg    metoprolol SR (Toprol XL) tablet 25 mg  25 mg    isosorbide mononitrate SR (Imdur) tablet 30 mg  30 mg    ranolazine (Ranexa) 500 MG SR tablet TB12 1,000 mg  1,000 mg    HYDROmorphone (Dilaudid) injection 0.5 mg  0.5 mg    Pharmacy Consult Request ...Pain Management Review 1 Each  1 Each    oxyCODONE immediate-release (Roxicodone) tablet 5 mg  5 mg    Or    oxyCODONE immediate release (Roxicodone) tablet 10 mg  10 mg    Or    HYDROmorphone (Dilaudid) injection 0.5 mg  0.5 mg    methocarbamol (Robaxin) tablet 500 mg  500 mg    lidocaine (Asperflex) 4 % patch 1 Patch  1 Patch    enoxaparin (Lovenox) inj 40 mg  40 mg    albuterol inhaler 1-2 Puff  1-2 Puff    gabapentin (Neurontin) capsule 1,200 mg  1,200 mg    lamoTRIgine (LaMICtal) tablet 50 mg  50 mg    ziprasidone (Geodon) capsule 40 mg  40 mg    acetaminophen (Tylenol) tablet 650 mg  650 mg    labetalol (Normodyne/Trandate) injection 10 mg  10 mg    ondansetron (Zofran) syringe/vial injection 4 mg  4 mg    ondansetron (Zofran ODT) dispertab 4 mg  4 mg    promethazine (Phenergan) tablet 12.5-25 mg  12.5-25 mg    promethazine (Phenergan) suppository 12.5-25 mg  12.5-25 mg    prochlorperazine (Compazine) injection 5-10 mg  5-10 mg     "ipratropium-albuterol (DUONEB) nebulizer solution  3 mL    ketorolac (Toradol) 15 MG/ML injection 15 mg  15 mg    Followed by    ibuprofen (Motrin) tablet 800 mg  800 mg       Allergies:  Allergies   Allergen Reactions    Cefdinir Shortness of Breath and Itching     Tolerated 1/18/17  Tolerates ceftriaxone  Tolerated augmentin 8/2019     Depakote [Divalproex Sodium] Unspecified     Muscle spasms/muscle pain and weakness      Doxycycline Anaphylaxis and Vomiting     Other reaction(s): pustules/blisters  Other reaction(s): stomach pain    Montelukast [Singulair] Unspecified     Cardiac effusion    Vancomycin Itching     Pt becomes flushed in face and chest.   RXN=7/10/16    Wound Dressing Adhesive Rash     By pt report-\"removes skin totally off\"    Amitriptyline Unspecified     Headaches      Amoxicillin Rash      Tolerates augmentin    Aripiprazole [Abilify] Unspecified     Headaches/muscle twitching      Clindamycin Nausea         Other reaction(s): nausea stomach pain    Erythromycin Rash     .  Other reaction(s): nausea stomach pain    Flomax [Tamsulosin Hydrochloride] Swelling    Hydromorphone      Other reaction(s): vomiting    Levaquin Unspecified     Severe muscle cramps in legs  RXN=unknown  Other reaction(s): leg muscle cramps    Metformin Unspecified     Increased lactic acid     Other reaction(s): itching and rash/nausea vomiting    Sulfa Drugs Hives, Itching, Myalgia and Unspecified     Muscle pain and weakness    Other reaction(s): unknown    Tamsulosin Swelling     Swelling of legs    Tape Rash     Tears skin off  coban with Tegaderm tape ok intermittently  RXN=ongoing    Sulfamethoxazole W-Trimethoprim Rash    Ciprofloxacin Rash          Keflex Rash     Pt states she gets a rash with this medication  Tolerates ceftriaxone  Can take with Benadryl    Levofloxacin Unspecified     Leg muscle cramps    Metronidazole Rash     \"Vision problems\"  Other reaction(s): vision problems         Physical " "Exam:  Vitals: BP (!) 142/75   Pulse 80   Temp 36.1 °C (97 °F) (Temporal)   Resp 19   Ht 1.626 m (5' 4\")   Wt 117 kg (258 lb)   SpO2 94%   Gen: NAD, seated comfortably in recliner   Head:  NC/AT  Eyes/ Nose/ Mouth: PERRLA, moist mucous membranes  Cardio: RRR, good distal perfusion, warm extremities  Pulm: normal respiratory effort, no cyanosis   Abd: Soft NTND,   Ext: No peripheral edema. No calf tenderness. No clubbing.    Mental status:  A&Ox4 (person, place, date, situation) answers questions appropriately follows commands  Speech: fluent, no aphasia or dysarthria    CRANIAL NERVES:  2,3: visual acuity grossly intact, PERRL  3,4,6: EOMI bilaterally, no nystagmus or diplopia  5: sensation intact to light touch bilaterally and symmetric  7: no facial asymmetry  8: hearing grossly intact      Motor: inconsistent exam with strength testing       Upper Extremity  Myotome R L   Shoulder flexion C5 5/5 5/5   Elbow flexion C5 5/5 5/5   Wrist extension C6 5/5 5/5   Elbow extension C7 5/5 5/5   Finger flexion C8 5/5 5/5   Finger abduction T1 5/5 5/5     Lower Extremity Myotome R L   Hip flexion L2 3-4/5 3-4/5   Knee extension L3 3-4/5 3-4/5   Ankle dorsiflexion L4 3/5 5/5   Toe extension L5 5/5 5/5   Ankle plantarflexion S1 5/5 5/5       Negative Pronator drift bilaterally    Sensory:   intact to light touch through out    No clonus at bilateral ankles    Tone: no spasticity noted    Coordination:   intact fine motor with fingers bilaterally      Labs: Reviewed and significant for   Recent Labs     04/11/25  0019 04/12/25  0627 04/13/25  0041   RBC 4.36 4.81 4.24   HEMOGLOBIN 13.6 15.0 13.5   HEMATOCRIT 41.1 46.6 39.8   PLATELETCT 166 147* 146*     Recent Labs     04/11/25  0019 04/12/25  0627 04/13/25  0041   SODIUM 138 138 140   POTASSIUM 3.9 4.2 3.8   CHLORIDE 110 113* 113*   CO2 18* 15* 20   GLUCOSE 105* 112* 155*   BUN 12 9 7*   CREATININE 0.92 0.78 0.64   CALCIUM 8.8 9.1 8.8     Recent Results (from the past 24 " hours)   EKG    Collection Time: 25  5:19 PM   Result Value Ref Range    Report       [facility]    Test Date:  2025  Pt Name:    HARLEY DE LA CRUZ              Department: [facility]  MRN:        [id number]                      Room:       S534  Gender:     Female                       Technician: ASAF  :        [id number]                   Requested By:SMITHA JARA  Order #:    [id number]                    Reading MD: Vance Lindo MD    Measurements  Intervals                                Axis  Rate:       63                           P:          38  NJ:         150                          QRS:        44  QRSD:       91                           T:          13  QT:         432  QTc:        443    Interpretive Statements  Sinus rhythm  Nonspecific T abnormalities, anterior leads  Electronically Signed On 2025 17:19:59 PDT by Vance Lindo MD     CBC WITHOUT DIFFERENTIAL    Collection Time: 25 12:41 AM   Result Value Ref Range    WBC 4.0 (L) 4.8 - 10.8 K/uL    RBC 4.24 4.20 - 5.40 M/uL    Hemoglobin 13.5 12.0 - 16.0 g/dL    Hematocrit 39.8 37.0 - 47.0 %    MCV 93.9 81.4 - 97.8 fL    MCH 31.8 27.0 - 33.0 pg    MCHC 33.9 32.2 - 35.5 g/dL    RDW 43.1 35.9 - 50.0 fL    Platelet Count 146 (L) 164 - 446 K/uL    MPV 12.0 9.0 - 12.9 fL   MAGNESIUM    Collection Time: 25 12:41 AM   Result Value Ref Range    Magnesium 1.9 1.5 - 2.5 mg/dL   Renal Function Panel    Collection Time: 25 12:41 AM   Result Value Ref Range    Sodium 140 135 - 145 mmol/L    Potassium 3.8 3.6 - 5.5 mmol/L    Chloride 113 (H) 96 - 112 mmol/L    Co2 20 20 - 33 mmol/L    Glucose 155 (H) 65 - 99 mg/dL    Creatinine 0.64 0.50 - 1.40 mg/dL    Bun 7 (L) 8 - 22 mg/dL    Calcium 8.8 8.5 - 10.5 mg/dL    Correct Calcium 9.0 8.5 - 10.5 mg/dL    Phosphorus 2.7 2.5 - 4.5 mg/dL    Albumin 3.8 3.2 - 4.9 g/dL   ESTIMATED GFR    Collection Time: 25 12:41 AM   Result Value Ref Range    GFR (CKD-EPI) 118 >60 mL/min/1.73 m  "2         ASSESSMENT:  Patient is a 34 y.o. female admitted with back pain    Psychiatric Code / Diagnosis to Support: 0016 - Debility (Non-Cardiac, Non-Pulmonary)  See DISPO details below for recommendations on appropriate level of rehab for this diagnosis.    Barriers to transfer include: Insurance authorization, TCCs to verify disposition, medical clearance and bed availability     Assessment and Plan:  Debility due to Back pain  - Admitted with sudden onset acute low back pain  - MRI negative for any lower foraminal stenosis  - On Robaxin for muscle spasms  - On home dose gabapentin 1200 mg 3 times daily, can try lyrica, but patient had this before and had issues with it being prescribed to her in outpatient   -Has utilized as needed IV Toradol for pain control  - changed PRN tylenol to scheduled in order for patient to wean off IV pain meds   - May benefit from trial of a Medrol Dosepak to decrease acute pain flare if not bale to tolerate attempts for gait with therapy   - Continues to require PT/OT    Hypertension  - On home dose metoprolol, amlodipine and Imdur    Annetta-Danlos syndrome  - Reports symptoms of feelig like her \"bones might slip\"    abdominal binder order for lumbar support when OOB     Schizoaffective disorder  - On home dose Geodon, Lamictal    Chest pain  - Has intermittent episodes of chest pain, most recent EKG without evidence of ischemic changes  - Is on home dose Ranexa    Bowel  Last BM: 04/11/25  - Not on scheduled bowel meds, ordered scheduled senna and colace     DISPO:  - patient is currently functioning below their level of baseline, recommend post acute rehab  - recommend IRF level therapy with 3hr of therapy 5 days per week ONLY if patient can demonstrate gait with therapy   - prior to acceptance to IRF, will need pain controlled off IV pain meds, BM and demonstration of gait with therapy   - TCC to assist with insurance auth and DC support from family        Medical Complexity:  Back " pain  Hypertension  Annetta-Danlos syndrome  Schizoaffective disorder  Impaired mobility and ADLs    DVT PPX: Lovenox      Thank you for allowing us to participate in the care of this patient.     Patient was seen for >80 minutes on unit/floor of which > 50% of time was spent on counseling and coordination of care regarding the above, including prognosis, risk reduction, benefits of treatment, and options for next stage of care.    Terri Damian, DO   Physical Medicine and Rehabilitation     Please note that this dictation was created using voice recognition software. I have made every reasonable attempt to correct obvious errors, but there may be errors of grammar and possibly content that I did not discover before finalizing the note.

## 2025-04-13 NOTE — CARE PLAN
Problem: Knowledge Deficit - Standard  Goal: Patient and family/care givers will demonstrate understanding of plan of care, disease process/condition, diagnostic tests and medications  Outcome: Progressing   The patient is    Shift Goals  Clinical Goals: Ambulat to bathroom.  Patient Goals: Sleep comfortably; pain control  Family Goals: JODI    Progress made toward(s) clinical / shift goals:  Patient tolerated ambulation to the bathroom.    Patient is not progressing towards the following goals:

## 2025-04-14 LAB
ALBUMIN SERPL BCP-MCNC: 3.8 G/DL (ref 3.2–4.9)
BUN SERPL-MCNC: 8 MG/DL (ref 8–22)
CALCIUM ALBUM COR SERPL-MCNC: 9.6 MG/DL (ref 8.5–10.5)
CALCIUM SERPL-MCNC: 9.4 MG/DL (ref 8.5–10.5)
CHLORIDE SERPL-SCNC: 112 MMOL/L (ref 96–112)
CO2 SERPL-SCNC: 17 MMOL/L (ref 20–33)
CREAT SERPL-MCNC: 0.69 MG/DL (ref 0.5–1.4)
ERYTHROCYTE [DISTWIDTH] IN BLOOD BY AUTOMATED COUNT: 44.5 FL (ref 35.9–50)
GFR SERPLBLD CREATININE-BSD FMLA CKD-EPI: 116 ML/MIN/1.73 M 2
GLUCOSE SERPL-MCNC: 165 MG/DL (ref 65–99)
HCT VFR BLD AUTO: 41.4 % (ref 37–47)
HGB BLD-MCNC: 13.1 G/DL (ref 12–16)
MAGNESIUM SERPL-MCNC: 1.7 MG/DL (ref 1.5–2.5)
MCH RBC QN AUTO: 30.9 PG (ref 27–33)
MCHC RBC AUTO-ENTMCNC: 31.6 G/DL (ref 32.2–35.5)
MCV RBC AUTO: 97.6 FL (ref 81.4–97.8)
PHOSPHATE SERPL-MCNC: 3 MG/DL (ref 2.5–4.5)
PLATELET # BLD AUTO: 99 K/UL (ref 164–446)
PMV BLD AUTO: 11.9 FL (ref 9–12.9)
POTASSIUM SERPL-SCNC: 4.1 MMOL/L (ref 3.6–5.5)
RBC # BLD AUTO: 4.24 M/UL (ref 4.2–5.4)
SODIUM SERPL-SCNC: 139 MMOL/L (ref 135–145)
WBC # BLD AUTO: 4.1 K/UL (ref 4.8–10.8)

## 2025-04-14 PROCEDURE — G0378 HOSPITAL OBSERVATION PER HR: HCPCS

## 2025-04-14 PROCEDURE — 85027 COMPLETE CBC AUTOMATED: CPT

## 2025-04-14 PROCEDURE — 80069 RENAL FUNCTION PANEL: CPT

## 2025-04-14 PROCEDURE — 99233 SBSQ HOSP IP/OBS HIGH 50: CPT | Performed by: INTERNAL MEDICINE

## 2025-04-14 PROCEDURE — 700102 HCHG RX REV CODE 250 W/ 637 OVERRIDE(OP): Mod: UD | Performed by: INTERNAL MEDICINE

## 2025-04-14 PROCEDURE — A9270 NON-COVERED ITEM OR SERVICE: HCPCS | Mod: UD | Performed by: STUDENT IN AN ORGANIZED HEALTH CARE EDUCATION/TRAINING PROGRAM

## 2025-04-14 PROCEDURE — 700102 HCHG RX REV CODE 250 W/ 637 OVERRIDE(OP): Mod: UD | Performed by: PHYSICAL MEDICINE & REHABILITATION

## 2025-04-14 PROCEDURE — 97110 THERAPEUTIC EXERCISES: CPT

## 2025-04-14 PROCEDURE — A9270 NON-COVERED ITEM OR SERVICE: HCPCS | Mod: UD | Performed by: PHYSICAL MEDICINE & REHABILITATION

## 2025-04-14 PROCEDURE — A9270 NON-COVERED ITEM OR SERVICE: HCPCS | Mod: UD

## 2025-04-14 PROCEDURE — 97530 THERAPEUTIC ACTIVITIES: CPT

## 2025-04-14 PROCEDURE — 700111 HCHG RX REV CODE 636 W/ 250 OVERRIDE (IP): Mod: JZ,UD | Performed by: INTERNAL MEDICINE

## 2025-04-14 PROCEDURE — 700102 HCHG RX REV CODE 250 W/ 637 OVERRIDE(OP): Mod: UD

## 2025-04-14 PROCEDURE — 700101 HCHG RX REV CODE 250: Mod: UD | Performed by: INTERNAL MEDICINE

## 2025-04-14 PROCEDURE — 83735 ASSAY OF MAGNESIUM: CPT

## 2025-04-14 PROCEDURE — 700102 HCHG RX REV CODE 250 W/ 637 OVERRIDE(OP): Mod: UD | Performed by: STUDENT IN AN ORGANIZED HEALTH CARE EDUCATION/TRAINING PROGRAM

## 2025-04-14 PROCEDURE — A9270 NON-COVERED ITEM OR SERVICE: HCPCS | Mod: UD | Performed by: INTERNAL MEDICINE

## 2025-04-14 RX ORDER — METHYLPREDNISOLONE 4 MG/1
4 TABLET ORAL 2 TIMES DAILY WITH MEALS
Status: DISCONTINUED | OUTPATIENT
Start: 2025-04-18 | End: 2025-04-15 | Stop reason: HOSPADM

## 2025-04-14 RX ORDER — NITROGLYCERIN 0.4 MG/1
0.4 TABLET SUBLINGUAL
Status: DISCONTINUED | OUTPATIENT
Start: 2025-04-14 | End: 2025-04-14

## 2025-04-14 RX ORDER — NITROGLYCERIN 0.4 MG/1
0.4 TABLET SUBLINGUAL
Status: COMPLETED | OUTPATIENT
Start: 2025-04-14 | End: 2025-04-14

## 2025-04-14 RX ORDER — CALCIUM CARBONATE 500 MG/1
500 TABLET, CHEWABLE ORAL
Status: DISCONTINUED | OUTPATIENT
Start: 2025-04-14 | End: 2025-04-15

## 2025-04-14 RX ORDER — METHYLPREDNISOLONE 4 MG/1
8 TABLET ORAL
Status: DISCONTINUED | OUTPATIENT
Start: 2025-04-15 | End: 2025-04-15 | Stop reason: HOSPADM

## 2025-04-14 RX ORDER — METHYLPREDNISOLONE 4 MG/1
4 TABLET ORAL
Status: DISCONTINUED | OUTPATIENT
Start: 2025-04-15 | End: 2025-04-15 | Stop reason: HOSPADM

## 2025-04-14 RX ORDER — METHYLPREDNISOLONE 4 MG/1
4 TABLET ORAL
Status: DISCONTINUED | OUTPATIENT
Start: 2025-04-17 | End: 2025-04-15 | Stop reason: HOSPADM

## 2025-04-14 RX ORDER — METHYLPREDNISOLONE 4 MG/1
4 TABLET ORAL
Status: DISCONTINUED | OUTPATIENT
Start: 2025-04-16 | End: 2025-04-15 | Stop reason: HOSPADM

## 2025-04-14 RX ORDER — LANOLIN ALCOHOL/MO/W.PET/CERES
400 CREAM (GRAM) TOPICAL 2 TIMES DAILY
Status: COMPLETED | OUTPATIENT
Start: 2025-04-14 | End: 2025-04-14

## 2025-04-14 RX ADMIN — LIDOCAINE 1 PATCH: 4 PATCH TOPICAL at 15:55

## 2025-04-14 RX ADMIN — GABAPENTIN 1200 MG: 400 CAPSULE ORAL at 09:07

## 2025-04-14 RX ADMIN — LAMOTRIGINE 50 MG: 25 TABLET ORAL at 07:20

## 2025-04-14 RX ADMIN — ACETAMINOPHEN 650 MG: 325 TABLET, FILM COATED ORAL at 00:56

## 2025-04-14 RX ADMIN — NITROGLYCERIN 0.4 MG: 0.4 TABLET, ORALLY DISINTEGRATING SUBLINGUAL at 23:33

## 2025-04-14 RX ADMIN — AMLODIPINE BESYLATE 2.5 MG: 5 TABLET ORAL at 07:21

## 2025-04-14 RX ADMIN — LAMOTRIGINE 50 MG: 25 TABLET ORAL at 17:46

## 2025-04-14 RX ADMIN — NITROGLYCERIN 0.4 MG: 0.4 TABLET SUBLINGUAL at 20:52

## 2025-04-14 RX ADMIN — ENOXAPARIN SODIUM 40 MG: 100 INJECTION SUBCUTANEOUS at 07:21

## 2025-04-14 RX ADMIN — ZIPRASIDONE HYDROCHLORIDE 40 MG: 40 CAPSULE ORAL at 20:53

## 2025-04-14 RX ADMIN — METHOCARBAMOL 500 MG: 500 TABLET ORAL at 09:17

## 2025-04-14 RX ADMIN — METHOCARBAMOL 500 MG: 500 TABLET ORAL at 20:53

## 2025-04-14 RX ADMIN — Medication 400 MG: at 17:47

## 2025-04-14 RX ADMIN — ISOSORBIDE MONONITRATE 30 MG: 30 TABLET, EXTENDED RELEASE ORAL at 07:24

## 2025-04-14 RX ADMIN — OXYCODONE HYDROCHLORIDE 10 MG: 10 TABLET ORAL at 21:43

## 2025-04-14 RX ADMIN — METHOCARBAMOL 500 MG: 500 TABLET ORAL at 17:47

## 2025-04-14 RX ADMIN — ACETAMINOPHEN 650 MG: 325 TABLET, FILM COATED ORAL at 17:46

## 2025-04-14 RX ADMIN — Medication 400 MG: at 07:20

## 2025-04-14 RX ADMIN — ANTACID TABLETS 500 MG: 500 TABLET, CHEWABLE ORAL at 17:46

## 2025-04-14 RX ADMIN — METOPROLOL SUCCINATE 25 MG: 25 TABLET, EXTENDED RELEASE ORAL at 07:24

## 2025-04-14 RX ADMIN — OXYCODONE HYDROCHLORIDE 10 MG: 10 TABLET ORAL at 17:53

## 2025-04-14 RX ADMIN — RANOLAZINE 1000 MG: 500 TABLET, EXTENDED RELEASE ORAL at 17:46

## 2025-04-14 RX ADMIN — RANOLAZINE 1000 MG: 500 TABLET, EXTENDED RELEASE ORAL at 09:08

## 2025-04-14 RX ADMIN — NITROGLYCERIN 0.4 MG: 0.4 TABLET SUBLINGUAL at 21:00

## 2025-04-14 RX ADMIN — OXYCODONE HYDROCHLORIDE 10 MG: 10 TABLET ORAL at 07:18

## 2025-04-14 RX ADMIN — ANTACID TABLETS 500 MG: 500 TABLET, CHEWABLE ORAL at 12:56

## 2025-04-14 RX ADMIN — ACETAMINOPHEN 650 MG: 325 TABLET, FILM COATED ORAL at 07:20

## 2025-04-14 RX ADMIN — GABAPENTIN 1200 MG: 400 CAPSULE ORAL at 20:53

## 2025-04-14 RX ADMIN — ACETAMINOPHEN 650 MG: 325 TABLET, FILM COATED ORAL at 23:33

## 2025-04-14 RX ADMIN — GABAPENTIN 1200 MG: 400 CAPSULE ORAL at 15:55

## 2025-04-14 RX ADMIN — METOPROLOL SUCCINATE 25 MG: 25 TABLET, EXTENDED RELEASE ORAL at 17:46

## 2025-04-14 RX ADMIN — METHYLPREDNISOLONE 24 MG: 4 TABLET ORAL at 09:09

## 2025-04-14 ASSESSMENT — COGNITIVE AND FUNCTIONAL STATUS - GENERAL
SUGGESTED CMS G CODE MODIFIER MOBILITY: CJ
WALKING IN HOSPITAL ROOM: A LITTLE
MOBILITY SCORE: 22
CLIMB 3 TO 5 STEPS WITH RAILING: A LITTLE

## 2025-04-14 ASSESSMENT — GAIT ASSESSMENTS
DEVIATION: INCREASED BASE OF SUPPORT;BRADYKINETIC;SHUFFLED GAIT
GAIT LEVEL OF ASSIST: SUPERVISED
DISTANCE (FEET): 400
ASSISTIVE DEVICE: FRONT WHEEL WALKER

## 2025-04-14 ASSESSMENT — PAIN DESCRIPTION - PAIN TYPE
TYPE: ACUTE PAIN

## 2025-04-14 ASSESSMENT — ENCOUNTER SYMPTOMS
BACK PAIN: 1
MYALGIAS: 1

## 2025-04-14 NOTE — DISCHARGE PLANNING
Case Management Discharge Planning    Admission Date: 4/10/2025  GMLOS: 2.9  ALOS: 0    6-Clicks ADL Score: 16  6-Clicks Mobility Score: 11  PT and/or OT Eval ordered: Yes  Post-acute Referrals Ordered: Yes  Post-acute Choice Obtained: No  Has referral(s) been sent to post-acute provider:  Yes    Per IDT rounds, Kristin is up and about to the bathroom independently and is ambulating safely with a FWW.  She will dc home.    Anticipated Discharge Dispo: Discharge Disposition: Discharged to home/self care (01)    DME Needed: No    Action(s) Taken: Updated Provider/Nurse on Discharge Plan    Escalations Completed: None    Medically Clear: Yes    Next Steps: Discharge home with no needs.    Barriers to Discharge: None    Is the patient up for discharge tomorrow: No

## 2025-04-14 NOTE — PROGRESS NOTES
Assumed care of patient after receiving report from SONYA Brasher. Pt is A&Ox4. Bed is low & locked. Personal belongings within reach. Discussed plan of care for back brace and working with PT prior to going to Rehab. No further needs at this time.

## 2025-04-14 NOTE — DISCHARGE PLANNING
[facility] Transitional Care Coordination    Physiatry consult complete.  Dr. Connell recommending -     DISPO:  - patient is currently functioning below their level of baseline, recommend post acute rehab  - recommend IRF level therapy with 3hr of therapy 5 days per week ONLY if patient can demonstrate gait with therapy   - prior to acceptance to IRF, will need pain controlled off IV pain meds, BM and demonstration of gait with therapy   - TCC to assist with insurance auth and DC support from family      Current documentation reflects pt up independently to bathroom, ambulation with FWW, plan for discharge home.  If there are interval changes, please reach out to Rehab TCC [phone].     Thank you for the referral.

## 2025-04-14 NOTE — CARE PLAN
The patient is Stable - Low risk of patient condition declining or worsening    Shift Goals  Clinical Goals: Pain control  Patient Goals: Rest  Family Goals: JODI    Progress made toward(s) clinical / shift goals:    Problem: Fall Risk  Goal: Patient will remain free from falls  Outcome: Progressing  Note: Pt able to ambulate to and from restroom with FWW and no additional assistance. Pt has remained free from falls this shift.     Patient is not progressing towards the following goals:    Problem: Pain - Standard  Goal: Alleviation of pain or a reduction in pain to the patient’s comfort goal  Outcome: Not Progressing  Note: Pt given pain medication and within 10 minutes had vomited her medications up. Pt brought ice pack and put brace on to help with pain.

## 2025-04-14 NOTE — PROGRESS NOTES
"62-06\" abdominal binder sent to tube station 52, RN notified.     Contact traction for any questions or concerns.   "

## 2025-04-14 NOTE — PROGRESS NOTES
[facility] Medicine Daily Progress Note    Date of Service  4/14/2025    Chief Complaint  Kristin Balderrama is a 34 y.o. female admitted 4/10/2025 with   Chief Complaint   Patient presents with    Back Pain     This morning around 0800        Hospital Course  Kristin Balderrama is a 34 y.o. female who presented 4/10/2025 with severe low back pain.      Patient has a history of degenerative disc disease status post spinal surgery in 2010, Ehler-Danlos syndrome, inappropriate sinus tachycardia, morbid obesity, chronic urinary incontinence.  She was lifting some water bottles around today, when she felt a sudden pop in her lower back that she states that was above her L4 level.  Since then, she has had severe low back pain with shooting pain coming down her lower extremities.  She endorses numbness in her groin area and has not been able to urinate for about 7 hours.  She  was brought to the ER for further evaluation.     In ER, CBC/CMP unremarkable. Bladder scan of 190 ml.  Stat MRI lumbar spine without contrast performed showing no spinal or neuroforaminal stenosis noted.  Patient will be admitted for pain management and physical therapy.    Interval Problem Update  Patient was seen and examined at bedside.  I have personally reviewed and interpreted vitals, labs, and imaging.    4/11.  Afebrile.  Intermittent bradycardia.  Intermittent hypotension.  On room air.  Denies any fever, chills, chest pains, shortness of breath.  Complains of severe lower back pain.  PT/OT recommended postacute placement.  Consulted inpatient rehab.  Started on muscle relaxants with methocarbamol, Lidoderm patch.  Already on high-dose gabapentin.  No signs of stenosis or herniation on MRI lumbar.  Patient does report having to push down on her bladder in order to pee when previously she used to be incontinent.  She has some multiple allergies including to sulfa and tamsulosin.  Will continue to monitor bowel and bladder function.   Ordered bladder scan protocol.  Patient reports decreased appetite with solids and liquids, fatigue, weakness.  Though she is swallowing.  Evaluated by nutrition and does not meet criteria for malnutrition.  ESR 11  CRP 0.6  4/12.  Afebrile.  Stable vitals.  On room air.  Denies any fevers, chills.  She had some chest pain this morning.  States she had chest pain rating to the back of her scapula and upper neck.  States she gets this chronically from vasospasms and request to be restarted on her long-acting Imdur and sublingual nitroglycerin as well as Ranexa.  EKG was personally reviewed and interpreted with no acute ischemic changes..  Chest pain has resolved.  Still having severe back pain.  Reports chronically decreased appetite.  Reports she was too tired to eat breakfast and then was nauseous during lunch.  Adjusted pain meds.  4/13.  Afebrile.  Intermittent hypertension.  On room air.  Replete magnesium, phosphorus, potassium.  Denies fevers, chills.  Reports transient chest pain last night which resolved spontaneously.  Back pain is stable.  Abdominal yet but states she is passing gas.  Having better control of her bladder.  Being evaluated for inpatient rehab.  4/14.  Afebrile.  Intermittent hypertension.  On room air.  Replete mag.  Denies fevers, chills, chest pains, shortness of breath.  Leg pain is improved.  Has been up to chair and up to the bathroom.  Still having mild nausea vomiting, not tolerating much solids.  Inpatient rehab physiatry is following.  Recommended for use of back brace  Wbc 4.4 > 4 > 4.1  P 147 > 146 > 99    I have discussed this patient's plan of care and discharge plan at IDT rounds today with Case Management, Nursing, Nursing leadership, and other members of the IDT team.    Consultants/Specialty  None    Code Status  Full Code    Disposition  The patient is not medically cleared for discharge to home or a post-acute facility.  Anticipate discharge to: skilled nursing  facility    I have placed the appropriate orders for post-discharge needs.    Review of Systems  Review of Systems   Constitutional:  Positive for malaise/fatigue.   Musculoskeletal:  Positive for back pain and myalgias.        Physical Exam  Temp:  [36.1 °C (97 °F)-36.7 °C (98 °F)] 36.7 °C (98 °F)  Pulse:  [71-80] 71  Resp:  [17-19] 18  BP: (130-148)/(74-79) 130/74  SpO2:  [93 %-96 %] 93 %    Physical Exam  Vitals and nursing note reviewed.   Constitutional:       Appearance: Normal appearance. She is obese. She is ill-appearing.   HENT:      Head: Normocephalic and atraumatic.      Right Ear: External ear normal.      Left Ear: External ear normal.      Nose: Nose normal.      Mouth/Throat:      Mouth: Mucous membranes are moist.      Pharynx: Oropharynx is clear. No oropharyngeal exudate or posterior oropharyngeal erythema.   Eyes:      Extraocular Movements: Extraocular movements intact.      Conjunctiva/sclera: Conjunctivae normal.   Cardiovascular:      Rate and Rhythm: Normal rate and regular rhythm.      Pulses: Normal pulses.      Heart sounds: Normal heart sounds. No murmur heard.  Pulmonary:      Effort: Pulmonary effort is normal. No respiratory distress.      Breath sounds: Normal breath sounds. No stridor. No wheezing or rales.   Abdominal:      General: Abdomen is flat. Bowel sounds are normal. There is no distension.      Palpations: Abdomen is soft. There is no mass.      Tenderness: There is no abdominal tenderness.   Musculoskeletal:         General: Tenderness present.      Cervical back: Normal range of motion.   Skin:     General: Skin is warm.      Capillary Refill: Capillary refill takes less than 2 seconds.   Neurological:      General: No focal deficit present.      Mental Status: She is alert and oriented to person, place, and time. Mental status is at baseline.      Cranial Nerves: No cranial nerve deficit.   Psychiatric:         Mood and Affect: Mood normal.         Behavior: Behavior  normal.         Fluids    Intake/Output Summary (Last 24 hours) at 4/14/2025 0604  Last data filed at 4/14/2025 0130  Gross per 24 hour   Intake 460 ml   Output 500 ml   Net -40 ml        Laboratory  Recent Labs     04/12/25 0627 04/13/25 0041 04/14/25 0155   WBC 4.4* 4.0* 4.1*   RBC 4.81 4.24 4.24   HEMOGLOBIN 15.0 13.5 13.1   HEMATOCRIT 46.6 39.8 41.4   MCV 96.9 93.9 97.6   MCH 31.2 31.8 30.9   MCHC 32.2 33.9 31.6*   RDW 44.2 43.1 44.5   PLATELETCT 147* 146* 99*   MPV 11.5 12.0 11.9     Recent Labs     04/12/25 0627 04/13/25 0041 04/14/25 0155   SODIUM 138 140 139   POTASSIUM 4.2 3.8 4.1   CHLORIDE 113* 113* 112   CO2 15* 20 17*   GLUCOSE 112* 155* 165*   BUN 9 7* 8   CREATININE 0.78 0.64 0.69   CALCIUM 9.1 8.8 9.4                   Imaging  MR-LUMBAR SPINE-W/O   Final Result      1.  Postsurgical changes as described above.   2.  There is no spinal or neural foraminal stenosis. There has been no significant interval change.           Assessment/Plan  * Low back pain- (present on admission)  Assessment & Plan  4/14/2025  Patient with history of degenerative disc disease with spinal fusion in the past presents for acute onset low back pain, numbness in her groin area, urinary incontinence  Stat MRI lumbar spine was performed, no spinal or neuroforaminal stenosis appreciated  For now, admit patient overnight for pain management  PT OT recommend post acute    Hypertension  Assessment & Plan  4/14/2025  Resume home medications    Morbid obesity (HCC)  Assessment & Plan  Body mass index is 44.29 kg/m².  Counseled about diet and exercise    Annetta-Danlos syndrome- (present on admission)  Assessment & Plan  4/14/2025  History of    Schizoaffective disorder, depressive type (HCC)- (present on admission)  Assessment & Plan  4/14/2025  Continue home medications         VTE prophylaxis: Lovenox    I have performed a physical exam and reviewed and updated ROS and Plan today (4/14/2025). In review of yesterday's note  (4/13/2025), there are no changes except as documented above.    Greater than 51 minutes spent prepping to see patient (e.g. review of tests) obtaining and/or reviewing separately obtained history. Performing a medically appropriate examination and/ evaluation.  Counseling and educating the patient/family/caregiver.  Ordering medications, tests, or procedures.  Referring and communicating with other health care professionals.  Documenting clinical information in EPIC.  Independently interpreting results and communicating results to patient/family/caregiver.  Care coordination.

## 2025-04-14 NOTE — CARE PLAN
The patient is Stable - Low risk of patient condition declining or worsening    Shift Goals  Clinical Goals: Pain control to patient's tolerable level of 2/10  Patient Goals: Rest and comfort all through the night shift  Family Goals: JODI no family member present    Progress made toward(s) clinical / shift goals:  on going    Patient is not progressing towards the following goals:  Problem: Pain - Standard  Goal: Alleviation of pain or a reduction in pain to the patient’s comfort goal  Description: Target End Date:  Prior to discharge or change in level of careDocument on Vitals flowsheet1.  Document pain using the appropriate pain scale per order or unit policy2.  Educate and implement non-pharmacologic comfort measures (i.e. relaxation, distraction, massage, cold/heat therapy, etc.)3.  Pain management medications as ordered4.  Reassess pain after pain med administration per policy5.  If opiods administered assess patient's response to pain medication is appropriate per POSS sedation scale6.  Follow pain management plan developed in collaboration with patient and interdisciplinary team (including palliative care or pain specialists if applicable)  Outcome: Progressing     Problem: Knowledge Deficit - Standard  Goal: Patient and family/care givers will demonstrate understanding of plan of care, disease process/condition, diagnostic tests and medications  Description: Target End Date:  1-3 days or as soon as patient condition allowsDocument in Patient Education1.  Patient and family/caregiver oriented to unit, equipment, visitation policy and means for communicating concern2.  Complete/review Learning Assessment3.  Assess knowledge level of disease process/condition, treatment plan, diagnostic tests and medications4.  Explain disease process/condition, treatment plan, diagnostic tests and medications  Outcome: Progressing     Problem: Skin Integrity  Goal: Skin integrity is maintained or improved  Description: Target  End Date:  Prior to discharge or change in level of careDocument interventions on Skin Risk/Shay flowsheet groups and corresponding LDA1.  Assess and monitor skin integrity, appearance and/or temperature2.  Assess risk factors for impaired skin integrity and/or pressures ulcers3.  Implement precautions to protect skin integrity in collaboration with interdisciplinary team4.  Implement pressure ulcer prevention protocol if at risk for skin breakdown5.  Confirm wound care consult if at risk for skin breakdown6.  Ensure patient use of pressure relieving devices  (Low air loss bed, waffle overlay, heel protectors, ROHO cushion, etc)  Outcome: Progressing     Problem: Depression  Goal: Patient and family/caregiver will verbalize accurate information about at least two of the possible causes of depression, three-four of the signs and symptoms of depression  Description: Target End Date:  1 to 3 days1.  Assess the patient's and family/caregiver's knowledge regarding depression and its causes.2.  Explain to the patient and family/caregiver regarding the major symptoms of depression.3.  Inform the patient and family/caregiver that depression can be treated through medications and psychotherapy.4.  Allow the patient to express feelings and perceptions5.  Express hope to the patient with realistic comments about the patient's strengths and resources.5.  Give positive feedback after a tasks are achieved.6.  Encourage identification of positive aspects of self.7.  Educate the patient about crisis intervention services such as suicide hotlines and other resources.  Outcome: Progressing     Problem: Fall Risk  Goal: Patient will remain free from falls  Description: Target End Date:  Prior to discharge or change in level of careDocument interventions on the Leonor Simon Fall Risk Assessment1.  Assess for fall risk factors2.  Implement fall precautions  Outcome: Progressing

## 2025-04-15 ENCOUNTER — PHARMACY VISIT (OUTPATIENT)
Dept: PHARMACY | Facility: MEDICAL CENTER | Age: 35
End: 2025-04-15
Payer: COMMERCIAL

## 2025-04-15 VITALS
HEIGHT: 64 IN | SYSTOLIC BLOOD PRESSURE: 131 MMHG | HEART RATE: 83 BPM | WEIGHT: 258 LBS | BODY MASS INDEX: 44.05 KG/M2 | TEMPERATURE: 97.6 F | RESPIRATION RATE: 20 BRPM | DIASTOLIC BLOOD PRESSURE: 88 MMHG | OXYGEN SATURATION: 95 %

## 2025-04-15 LAB
ALBUMIN SERPL BCP-MCNC: 4.3 G/DL (ref 3.2–4.9)
BUN SERPL-MCNC: 10 MG/DL (ref 8–22)
CALCIUM ALBUM COR SERPL-MCNC: 9.7 MG/DL (ref 8.5–10.5)
CALCIUM SERPL-MCNC: 9.9 MG/DL (ref 8.5–10.5)
CHLORIDE SERPL-SCNC: 105 MMOL/L (ref 96–112)
CO2 SERPL-SCNC: 18 MMOL/L (ref 20–33)
CREAT SERPL-MCNC: 0.77 MG/DL (ref 0.5–1.4)
ERYTHROCYTE [DISTWIDTH] IN BLOOD BY AUTOMATED COUNT: 41.1 FL (ref 35.9–50)
GFR SERPLBLD CREATININE-BSD FMLA CKD-EPI: 103 ML/MIN/1.73 M 2
GLUCOSE SERPL-MCNC: 263 MG/DL (ref 65–99)
HCT VFR BLD AUTO: 42 % (ref 37–47)
HGB BLD-MCNC: 14.6 G/DL (ref 12–16)
MAGNESIUM SERPL-MCNC: 2.1 MG/DL (ref 1.5–2.5)
MCH RBC QN AUTO: 31.7 PG (ref 27–33)
MCHC RBC AUTO-ENTMCNC: 34.8 G/DL (ref 32.2–35.5)
MCV RBC AUTO: 91.3 FL (ref 81.4–97.8)
PHOSPHATE SERPL-MCNC: 3.2 MG/DL (ref 2.5–4.5)
PLATELET # BLD AUTO: 171 K/UL (ref 164–446)
PMV BLD AUTO: 11.4 FL (ref 9–12.9)
POTASSIUM SERPL-SCNC: 4.2 MMOL/L (ref 3.6–5.5)
RBC # BLD AUTO: 4.6 M/UL (ref 4.2–5.4)
SODIUM SERPL-SCNC: 136 MMOL/L (ref 135–145)
WBC # BLD AUTO: 7.6 K/UL (ref 4.8–10.8)

## 2025-04-15 PROCEDURE — RXMED WILLOW AMBULATORY MEDICATION CHARGE: Performed by: INTERNAL MEDICINE

## 2025-04-15 PROCEDURE — 700102 HCHG RX REV CODE 250 W/ 637 OVERRIDE(OP): Mod: UD | Performed by: STUDENT IN AN ORGANIZED HEALTH CARE EDUCATION/TRAINING PROGRAM

## 2025-04-15 PROCEDURE — 83735 ASSAY OF MAGNESIUM: CPT

## 2025-04-15 PROCEDURE — A9270 NON-COVERED ITEM OR SERVICE: HCPCS | Mod: UD | Performed by: STUDENT IN AN ORGANIZED HEALTH CARE EDUCATION/TRAINING PROGRAM

## 2025-04-15 PROCEDURE — A9270 NON-COVERED ITEM OR SERVICE: HCPCS | Mod: UD | Performed by: PHYSICAL MEDICINE & REHABILITATION

## 2025-04-15 PROCEDURE — G0378 HOSPITAL OBSERVATION PER HR: HCPCS

## 2025-04-15 PROCEDURE — 80069 RENAL FUNCTION PANEL: CPT

## 2025-04-15 PROCEDURE — 700111 HCHG RX REV CODE 636 W/ 250 OVERRIDE (IP): Mod: JZ,UD | Performed by: STUDENT IN AN ORGANIZED HEALTH CARE EDUCATION/TRAINING PROGRAM

## 2025-04-15 PROCEDURE — 700111 HCHG RX REV CODE 636 W/ 250 OVERRIDE (IP): Mod: JZ,UD | Performed by: INTERNAL MEDICINE

## 2025-04-15 PROCEDURE — 700102 HCHG RX REV CODE 250 W/ 637 OVERRIDE(OP): Mod: UD | Performed by: INTERNAL MEDICINE

## 2025-04-15 PROCEDURE — 99239 HOSP IP/OBS DSCHRG MGMT >30: CPT | Performed by: INTERNAL MEDICINE

## 2025-04-15 PROCEDURE — A9270 NON-COVERED ITEM OR SERVICE: HCPCS | Mod: UD

## 2025-04-15 PROCEDURE — A9270 NON-COVERED ITEM OR SERVICE: HCPCS | Mod: UD | Performed by: INTERNAL MEDICINE

## 2025-04-15 PROCEDURE — 700102 HCHG RX REV CODE 250 W/ 637 OVERRIDE(OP): Mod: UD | Performed by: PHYSICAL MEDICINE & REHABILITATION

## 2025-04-15 PROCEDURE — 85027 COMPLETE CBC AUTOMATED: CPT

## 2025-04-15 PROCEDURE — 700102 HCHG RX REV CODE 250 W/ 637 OVERRIDE(OP): Mod: UD

## 2025-04-15 RX ORDER — LIDOCAINE 4 G/G
1 PATCH TOPICAL EVERY 24 HOURS
COMMUNITY
Start: 2025-04-15 | End: 2025-04-17

## 2025-04-15 RX ORDER — OXYCODONE HYDROCHLORIDE 5 MG/1
5 TABLET ORAL EVERY 6 HOURS PRN
Qty: 12 TABLET | Refills: 0 | Status: SHIPPED | OUTPATIENT
Start: 2025-04-15 | End: 2025-04-17 | Stop reason: SDUPTHER

## 2025-04-15 RX ORDER — DIAZEPAM 5 MG/1
5 TABLET ORAL EVERY 6 HOURS PRN
Status: DISCONTINUED | OUTPATIENT
Start: 2025-04-15 | End: 2025-04-15 | Stop reason: HOSPADM

## 2025-04-15 RX ORDER — ACETAMINOPHEN 325 MG/1
650 TABLET ORAL EVERY 6 HOURS
COMMUNITY
Start: 2025-04-15

## 2025-04-15 RX ORDER — CALCIUM CARBONATE 500 MG/1
500 TABLET, CHEWABLE ORAL PRN
Status: DISCONTINUED | OUTPATIENT
Start: 2025-04-15 | End: 2025-04-15 | Stop reason: HOSPADM

## 2025-04-15 RX ORDER — ONDANSETRON 4 MG/1
4 TABLET, ORALLY DISINTEGRATING ORAL EVERY 4 HOURS PRN
Qty: 10 TABLET | Refills: 0 | Status: SHIPPED | OUTPATIENT
Start: 2025-04-15

## 2025-04-15 RX ORDER — METHOCARBAMOL 500 MG/1
500 TABLET, FILM COATED ORAL 4 TIMES DAILY
Qty: 120 TABLET | Refills: 0 | Status: SHIPPED | OUTPATIENT
Start: 2025-04-15 | End: 2025-04-17 | Stop reason: SDUPTHER

## 2025-04-15 RX ORDER — CALCIUM CARBONATE 500 MG/1
500 TABLET, CHEWABLE ORAL PRN
COMMUNITY
Start: 2025-04-15 | End: 2025-04-17

## 2025-04-15 RX ORDER — AMOXICILLIN 250 MG
1 CAPSULE ORAL DAILY
Qty: 30 TABLET | Refills: 0 | Status: SHIPPED | OUTPATIENT
Start: 2025-04-16

## 2025-04-15 RX ADMIN — METHYLPREDNISOLONE 4 MG: 4 TABLET ORAL at 11:58

## 2025-04-15 RX ADMIN — ACETAMINOPHEN 650 MG: 325 TABLET, FILM COATED ORAL at 06:25

## 2025-04-15 RX ADMIN — ISOSORBIDE MONONITRATE 30 MG: 30 TABLET, EXTENDED RELEASE ORAL at 06:27

## 2025-04-15 RX ADMIN — ENOXAPARIN SODIUM 40 MG: 100 INJECTION SUBCUTANEOUS at 06:27

## 2025-04-15 RX ADMIN — ANTACID TABLETS 500 MG: 500 TABLET, CHEWABLE ORAL at 11:58

## 2025-04-15 RX ADMIN — ACETAMINOPHEN 650 MG: 325 TABLET, FILM COATED ORAL at 11:58

## 2025-04-15 RX ADMIN — METOPROLOL SUCCINATE 25 MG: 25 TABLET, EXTENDED RELEASE ORAL at 06:25

## 2025-04-15 RX ADMIN — METHOCARBAMOL 500 MG: 500 TABLET ORAL at 08:10

## 2025-04-15 RX ADMIN — METHOCARBAMOL 500 MG: 500 TABLET ORAL at 12:00

## 2025-04-15 RX ADMIN — RANOLAZINE 1000 MG: 500 TABLET, EXTENDED RELEASE ORAL at 06:25

## 2025-04-15 RX ADMIN — METHYLPREDNISOLONE 4 MG: 4 TABLET ORAL at 08:10

## 2025-04-15 RX ADMIN — GABAPENTIN 1200 MG: 400 CAPSULE ORAL at 08:09

## 2025-04-15 RX ADMIN — ONDANSETRON 4 MG: 2 INJECTION INTRAMUSCULAR; INTRAVENOUS at 08:09

## 2025-04-15 RX ADMIN — ANTACID TABLETS 500 MG: 500 TABLET, CHEWABLE ORAL at 03:12

## 2025-04-15 RX ADMIN — SENNOSIDES AND DOCUSATE SODIUM 1 TABLET: 50; 8.6 TABLET ORAL at 06:25

## 2025-04-15 RX ADMIN — OXYCODONE HYDROCHLORIDE 10 MG: 10 TABLET ORAL at 06:24

## 2025-04-15 RX ADMIN — AMLODIPINE BESYLATE 2.5 MG: 5 TABLET ORAL at 06:24

## 2025-04-15 RX ADMIN — LAMOTRIGINE 50 MG: 25 TABLET ORAL at 06:23

## 2025-04-15 ASSESSMENT — PAIN DESCRIPTION - PAIN TYPE
TYPE: ACUTE PAIN
TYPE: ACUTE PAIN

## 2025-04-15 NOTE — PROGRESS NOTES
Discharge instructions reviewed and signed, all questions answered, PIV removed, Meds to Beds delivered, home meds returned.

## 2025-04-15 NOTE — PROGRESS NOTES
Assumed care at 1900. Received report from SONYA Blackwell. Pt is resting in bed. Assessment completed. A&O X 4. VSS. No complaints of chest pain, SOB, or N/V. Lung sounds clear. No cardiac murmurs noted. Normoactive bowel sounds in all quadrants. Pt reports 8/10 pain at this time, no medication available at this time. Pt is on RA. Pt is SBA with FWW. Educated on POC and verbalizes understanding. Bed is locked and in the lowest position. Call light and belongings are within reach. No further needs at this time.

## 2025-04-15 NOTE — CARE PLAN
The patient is Stable - Low risk of patient condition declining or worsening    Shift Goals  Clinical Goals: pain control, vasospams management  Patient Goals: sleep, pain management  Family Goals: JODI    Progress made toward(s) clinical / shift goals:  Pain controlled, Asking questions    Patient is not progressing towards the following goals:      Problem: Pain - Standard  Goal: Alleviation of pain or a reduction in pain to the patient’s comfort goal  Outcome: Progressing  Note: Educated about the pain scale and non pharmacological pain methods, check the MAR      Problem: Knowledge Deficit - Standard  Goal: Patient and family/care givers will demonstrate understanding of plan of care, disease process/condition, diagnostic tests and medications  Outcome: Progressing  Note: Educated about POC

## 2025-04-15 NOTE — THERAPY
Physical Therapy   Discharge     Patient Name: Kristin Balderrama  Age:  34 y.o., Sex:  female  Medical Record #: [id number]  Today's Date: 4/14/2025     Precautions  Precautions: Fall Risk;TLSO (Thoracolumbosacral orthosis)  Comments: TLSO in room (no formal orders in chart); spinal prx for comfort    Assessment    Pt seen for PT tx session. Pt demo'd much improved mobility compared to eval. Able to walk 400 ft with FWW and SPV today. Performed seated LE therex as below. Educated pt about using FWW at home for improved pain, endurance, and fall risk as well as benefits of HHPT, pt agreeable with time.    Pt has met all acute PT goals thus PT will sign off. Patient has achieved highest practical level of function in an acute care setting. Patient will not be actively followed for physical therapy services at this time, however may be seen if requested by physician for 1 more visit within 30 days to address any discharge or equipment needs.     Plan    Reason for Discharge From Therapy: Discharge Secondary to Goals Met    DC Equipment Recommendations: None (pt owns FWW)  Discharge Recommendations: Recommend home health for continued physical therapy services      Subjective    Pt received resting in bed, agreeable to participate.      Objective       04/14/25 1627   Precautions   Precautions Fall Risk;TLSO (Thoracolumbosacral orthosis)   Comments TLSO in room (no formal orders in chart); spinal prx for comfort   Vitals   O2 Delivery Device None - Room Air   Pain 0 - 10 Group   Therapist Pain Assessment During Activity;Post Activity Pain Same as Prior to Activity;Nurse Notified  (c/o back pain throughout session although much improved with brace donned compared to admit)   Cognition    Cognition / Consciousness WDL   Level of Consciousness Alert   Comments pleasant and cooperative, receptive to edu   Sitting Lower Body Exercises   Sitting Lower Body Exercises Yes   Ankle Pumps 1 set of 10;Right ;Left   Long Arc Quad 1  set of 10;Right;Left  (x5 reps each LE, to tolerance)   Marching 1 set of 10  (x5 reps each LE, to tolerance)   Comments ankle circles in both directions x10 each LE. Increased effort for all exercises    Balance   Sitting Balance (Static) Good   Sitting Balance (Dynamic) Good   Standing Balance (Static) Fair +   Standing Balance (Dynamic) Fair +   Weight Shift Sitting Good   Weight Shift Standing Fair   Skilled Intervention Compensatory Strategies;Verbal Cuing   Comments FWW   Bed Mobility    Supine to Sit Supervised   Scooting Supervised   Rolling Supervised   Skilled Intervention Compensatory Strategies;Verbal Cuing   Comments HOBSOFÍA slightly, log roll although used momentum to swing her trunk up OOB   Gait Analysis   Gait Level Of Assist Supervised   Assistive Device Front Wheel Walker   Distance (Feet) 400   # of Times Distance was Traveled 1   Deviation Increased Base Of Support;Bradykinetic;Shuffled Gait   # of Stairs Climbed 0   Skilled Intervention Compensatory Strategies;Verbal Cuing   Functional Mobility   Sit to Stand Supervised   Bed, Chair, Wheelchair Transfer Supervised   Transfer Method Stand Step   Mobility bed>up around unit FWW>seated therex>chair   Skilled Intervention Compensatory Strategies;Verbal Cuing   6 Clicks Assessment - How much HELP from from another person do you currently need... (If the patient hasn't done an activity recently, how much help from another person do you think he/she would need if he/she tried?)   Turning from your back to your side while in a flat bed without using bedrails? 4   Moving from lying on your back to sitting on the side of a flat bed without using bedrails? 4   Moving to and from a bed to a chair (including a wheelchair)? 4   Standing up from a chair using your arms (e.g., wheelchair, or bedside chair)? 4   Walking in hospital room? 3   Climbing 3-5 steps with a railing? 3   6 clicks Mobility Score 22   Short Term Goals    Short Term Goal # 1 Pt will be able  to perform supine <> sit with SPV in 6 visits to progress bed mobility   Goal Outcome # 1 Goal met   Short Term Goal # 2 Pt will be able to perform STS with FWW and SPV in 6 visits to progress functional mobility   Goal Outcome # 2 Goal met   Short Term Goal # 3 Pt will be able to ambulate 150ft with SPV in 6 visits to progress functional gait   Goal Outcome # 3 Goal met   Education Group   Education Provided Use of Assistive Device   Use of Assistive Device Patient Response Patient;Acceptance;Explanation;Demonstration;Verbal Demonstration;Action Demonstration   Additional Comments educated pt about using FWW at home for improved pain, endurance, and fall risk as well as benefits of HHPT, pt agreeable with time. Pt able to independently recall 3/3 spinal prx plus log roll without cues   Physical Therapy Treatment Plan   Physical Therapy Treatment Plan Modify Current Treatment Plan   Duration Discharge Needs Only   Reason For Discharge Discharge Secondary to Goals Met   Anticipated Discharge Equipment and Recommendations   DC Equipment Recommendations None  (pt owns FWW)   Discharge Recommendations Recommend home health for continued physical therapy services   Interdisciplinary Plan of Care Collaboration   IDT Collaboration with  Nursing   Patient Position at End of Therapy Seated;Chair Alarm On;Call Light within Reach;Tray Table within Reach;Phone within Reach   Collaboration Comments RN updated   Session Information   Date / Session Number  4/14- 2(2/4, 4/17) d/c goals met

## 2025-04-15 NOTE — PROGRESS NOTES
Assumed care of pt at 0730. Report received and bedside rounding completed with night RN. Pt is calm no SOB, or in any acute distress noted.     Fall precautions in place,  bed alarm. - Treaded non slip socks. Bed locked. Communication board updated with POC. Call light and pt belongings within reach - hourly rounding in place.   Up in the chair this morning with standby assist using an TLSO brace for comfort. Educated about the pain scale and medications in the MAR.     1230: Patient has been sitting up in the chair. MD Harper already spoke to the patient about her discharge. Patient verbalized that she is ready to d/c.     1411: D/C lounge orders placed. Patient has her friend picking her up at 1430. No further questions needed from the patient. IV 22 G to the R. Wrist removed.     1455: Transport arrived. Heading to the d/c lounge.

## 2025-04-15 NOTE — DISCHARGE PLANNING
Case Management Discharge Planning    Admission Date: 4/10/2025  GMLOS: 2.9  ALOS: 0    6-Clicks ADL Score: 16  6-Clicks Mobility Score: 22  PT and/or OT Eval ordered: No  Post-acute Referrals Ordered: Yes  Post-acute Choice Obtained: Yes  Has referral(s) been sent to post-acute provider:  Yes      Anticipated Discharge Dispo: Discharge Disposition: D/T to home under A care in anticipation of covered skilled care (06)    Kristin is employed by [employer] and this is her HH choice.    DME Needed: No  She owns her own FWW    Action(s) Taken: Updated Provider/Nurse on Discharge Plan and Choice obtained    Escalations Completed: None    Medically Clear: Yes    Next Steps: Discharge home with     Barriers to Discharge: None    Is the patient up for discharge tomorrow: No

## 2025-04-15 NOTE — PROGRESS NOTES
To whom it may concern,    Please excuse Kristin Balderrama from work as she was under our care. She was hospitalized 4/10/2025 to 4/154/2025. She may return to work 4/25/2025. I recommend avoiding heavy lifting  (not more than 25lbs) or strenuous activity.     Thank you,  Dmitriy Harper MD  Hospitl Medicine  [address] [place] [address]  [phone]

## 2025-04-15 NOTE — CARE PLAN
The patient is Stable - Low risk of patient condition declining or worsening    Shift Goals  Clinical Goals: pain control, vasospams management  Patient Goals: sleep, pain management  Family Goals: JODI    Progress made toward(s) clinical / shift goals:  Patient and family educated on plan of care and verbalized understanding, being able to identify barriers for discharge. Patient has remained free of falls this shift with appropriate fall precautions in place throughout. Patient skin integrity maintained throughout shift with skin interventions and checks. Pt rounded on hourly and all needs met.     Problem: Pain - Standard  Goal: Alleviation of pain or a reduction in pain to the patient’s comfort goal  Outcome: Progressing     Problem: Knowledge Deficit - Standard  Goal: Patient and family/care givers will demonstrate understanding of plan of care, disease process/condition, diagnostic tests and medications  Outcome: Progressing     Problem: Skin Integrity  Goal: Skin integrity is maintained or improved  Outcome: Progressing     Problem: Depression  Goal: Patient and family/caregiver will verbalize accurate information about at least two of the possible causes of depression, three-four of the signs and symptoms of depression  Outcome: Progressing     Problem: Fall Risk  Goal: Patient will remain free from falls  Outcome: Progressing       Patient is not progressing towards the following goals:

## 2025-04-16 ENCOUNTER — PATIENT OUTREACH (OUTPATIENT)
Dept: MEDICAL GROUP | Facility: MEDICAL CENTER | Age: 35
End: 2025-04-16
Payer: MEDICARE

## 2025-04-16 NOTE — PROGRESS NOTES
Transitional Care Management  TCM Outreach Date and Time: Filed (4/16/2025  8:53 AM)    Discharge Questions  Actual Discharge Date: 04/15/25  Now that you are home, how are you feeling?: Good (medication is helping some, pain is 9/10)  Did you receive any new prescriptions?: Yes (tylenol, calcium carbonate, liodcaine patch, methocarbamol, zofran, oxycodone, senna-docusate ; STOP azithromycin)  Were you able to get them filled?: Yes  Meds to Bed or Pharmacy filled?: Pharmacy  Do you have any questions about your current medications or new medications (Review Med Rec)?: No  Did you have any durable medical equipment ordered?: No  Do you have a follow up appointment scheduled with your PCP?: Yes  Appointment Date: 04/17/25  Appointment Time: 1700  Any issues or paperwork you wish to discuss with your PCP?: Yes (Please specify) (still having alot of pain despite the medicine)  If Home Health was ordered, have they contacted you (Patient): No (Give phone number) ([facility] [phone])  Did you have enough support after your last discharge?: Yes  Does this patient qualify for the CCM program?: No    Transitional Care  Number of attempts made to contact patient: 1  Current or previous attempts completed within two business days of discharge? : Yes  Provided education regarding treatment plan, medications, self-management, ADLs?: No  Has patient completed an Advanced Directive?: Yes  Is the patient's advanced directive on file?: Yes  Has the Care Manager's phone number provided?: Yes  Is there anything else I can help you with?: No    Discharge Summary  Chief Complaint: Back Pain        This morning around 0800  Admitting Diagnosis: Low back pain  Discharge Diagnosis: Low back pain

## 2025-04-16 NOTE — DISCHARGE SUMMARY
Discharge Summary    CHIEF COMPLAINT ON ADMISSION  Chief Complaint   Patient presents with    Back Pain     This morning around 0800        Reason for Admission  back pain     Admission Date  4/10/2025    CODE STATUS  Prior    HPI & HOSPITAL COURSE  This is a 34 y.o. female here with Back Pain (This morning around 0800 )  Please review Dr. Abelardo Baxter M.D. notes for further details of history of present illness, past medical/social/family histories, allergies and medications.   34 y.o. obese female with a history of degenerative disc disease status post spinal surgery in 2010, Ehler-Danlos syndrome, inappropriate sinus tachycardia, morbid obesity, chronic urinary incontinence. who presented 4/10/2025 with severe low back pain.  She was lifting some water bottles around today, when she felt a sudden pop in her lower back that she states that was above her L4 level.  Since then, she has had severe low back pain with shooting pain coming down her lower extremities. She endorses numbness in her groin area and has not been able to urinate for about 7 hours.  She  was brought to the ER for further evaluation. In ER, CBC/CMP unremarkable. Bladder scan of 190 ml.  Stat MRI lumbar spine without contrast performed showing no spinal or neuroforaminal stenosis noted. Shr eas provided with pain medications. SHe improved. Added narcotics, antispasmodics and continued her gabapentin. She was discharged with [facility] care.       At discharge date, Kristin Balderrama afebrile and hemodynamically stable.  Kristin Balderrama wanted to be discharged today.      Imaging  MR-LUMBAR SPINE-W/O   Final Result      1.  Postsurgical changes as described above.   2.  There is no spinal or neural foraminal stenosis. There has been no significant interval change.                    Therefore, she is discharged in fair and stable condition to home with organized home healthcare and close outpatient follow-up.    The patient met  2-midnight criteria for an inpatient stay at the time of discharge.    Discharge Date  4/15/2025    FOLLOW UP ITEMS POST DISCHARGE      DISCHARGE DIAGNOSES  Principal Problem:    Low back pain (POA: Yes)  Active Problems:    Schizoaffective disorder, depressive type (HCC) (POA: Yes)    Annetta-Danlos syndrome (Chronic) (POA: Yes)    Morbid obesity (HCC) (POA: Unknown)    Hypertension (POA: Unknown)        FOLLOW UP  Future Appointments   Date Time Provider Department Center   4/17/2025  2:45 PM Abhishek Smith M.D. CAR[facility] None   4/18/2025  8:00 AM Reinier Melgar M.D. [facility] [address]   5/12/2025  7:00 AM Fly Goodson M.D. [address]   5/22/2025  7:45 AM Pineda Lopeza, PT, DPT, OCS [address] [place] [address]   5/28/2025  8:30 AM Pineda Lopeza, PT, DPT, OCS [address] [place] [address]   6/4/2025  7:45 AM Pineda Lopeza, PT, DPT, OCS [address] [place] [address]   6/10/2025  8:30 AM Debyb Wolf M.D. [facility] None   6/11/2025  7:45 AM Pineda Lopeza, PT, DPT, OCS [address] [facility] [address]   6/13/2025  7:15 AM Pineda Lopeza, PT, DPT, OCS [address] [facility] [address]   6/17/2025  8:00 AM Pineda PIZARRO Staffa, PT, DPT, OCS [address] [facility] [address]   6/19/2025  7:45 AM Pineda PIZARRO Staffa, PT, DPT, OCS [address] [facility] [address]   6/24/2025  8:00 AM Pineda PIZARRO Staffa, PT, DPT, OCS [address] [facility] [address]   6/26/2025  7:45 AM Pineda PIZARRO Staffa, PT, DPT, OCS PT[address] [facility] [address]   7/1/2025  8:00 AM Pineda PIZARRO Staffa, PT, DPT, OCS [address] [facility] [address]   7/3/2025  7:45 AM Pineda Lopeza, PT, DPT, OCS [address] [facility], Emergency Dept  [address] [facility] [id number] [phone]  Go to   If symptoms worsen    [facility] - [place] [id number]  [facility] [address]  [phone]        Fly Goodson M.D.  [address] [place] [address] [address]  [facility] [id number] [phone]    Call in 1 week(s)    Assign and follow up with primary provider or discharge clinic physician in 1 week. Refer to Charly Nunez  expert. Follow up with [facility] Neurology [facility] Pain in 1 week for degenerative disc disease with spinal fusion low back pain, migraines Follow up with Dr. Smith, Cardiology in 1 week for poor coronary circulation. While you have nonobstructive CAD and normal EF on last stress test,.cardiac CT shows poor circulation hence you are on BB, Imdur, and other cardiac medications that target ischemia or heart failure symptomatology Follow up with your ObGyn in 1-2 weeksa for polycystic ovaries Follow up with Dermatologist for recurrent skin abscesses in 1-2 weeks Nursing provide instructions for Intractable pain, narc use (Avoid swimming, driving or operating machinery. Treat constipation with prescribed bowel regimen), Check and record blood pressures at home at least twice a day for primary provider to review, diet controlled diabetes     MEDICATIONS ON DISCHARGE     Medication List        START taking these medications        Instructions   acetaminophen 325 MG Tabs  Commonly known as: Tylenol   Take 2 Tablets by mouth every 6 hours.  Dose: 650 mg     calcium carbonate 500 MG Chew  Commonly known as: Tums   Chew 1 Tablet as needed (GI pain.).  Dose: 500 mg     lidocaine 4 % Ptch  Commonly known as: Asperflex   Place 1 Patch on the skin every 24 hours.  Dose: 1 Patch     methocarbamol 500 MG Tabs  Commonly known as: Robaxin   Take 1 Tablet by mouth 4 times a day.  Dose: 500 mg     ondansetron 4 MG Tbdp  Commonly known as: Zofran ODT   Dissolve 1 Tablet by mouth every four hours as needed for Nausea/Vomiting (give PO if no IV route available).  Dose: 4 mg     oxyCODONE immediate-release 5 MG Tabs  Commonly known as: Roxicodone   Take 1 Tablet by mouth every 6 hours as needed for Severe Pain for up to 3 days.  Dose: 5 mg     Stimulant Laxative 8.6-50 MG Tabs  Generic drug: senna-docusate   Take 1 Tablet by mouth every day.  Dose: 1 Tablet            CONTINUE taking these medications        Instructions    albuterol 108 (90 Base) MCG/ACT Aers inhalation aerosol   Inhale 1-2 Puffs every four hours as needed for Shortness of Breath.  Dose: 1-2 Puff     amLODIPine 5 MG Tabs  Commonly known as: Norvasc   Take 1 tablet by mouth every day.  Dose: 5 mg     aspirin 81 MG EC tablet   Take 1 Tablet by mouth every day.  Dose: 81 mg     Combivent Respimat  MCG/ACT Aers  Generic drug: ipratropium-albuterol   Inhale 1 Puff 4 times a day as needed (Shortness of breath).  Dose: 1 Puff     dicyclomine 10 MG Caps  Commonly known as: Bentyl   Take 1 Capsule by mouth 2 times a day as needed for abdominal cramping/discomfort  Dose: 10 mg     diphenhydrAMINE 25 MG Tabs  Commonly known as: Benadryl   Take 50 mg by mouth at bedtime.  Dose: 50 mg     docusate sodium 250 MG capsule  Commonly known as: Colace   Take 250 mg by mouth 2 times a day.  Dose: 250 mg     Emgality 120 MG/ML Soaj  Generic drug: Galcanezumab-gnlm   Inject 1 mL subcutaneously every month.     FIBER PO   Take 3 Tablets by mouth every day.  Dose: 3 Tablet     gabapentin 400 MG Caps  Commonly known as: Neurontin   TAKE 3 CAPSULES BY MOUTH THREE TIMES DAILY.  FOR 30 DAY SUPPLY. DX: M79.7     isosorbide mononitrate SR 30 MG Tb24  Commonly known as: Imdur   Take 1 Tablet by mouth every morning.  Dose: 30 mg     ivabradine 7.5 MG Tabs tablet  Commonly known as: Corlanor   Take 1 Tablet by mouth 2 times a day with meals.  Dose: 7.5 mg     lamoTRIgine 25 MG Tabs  Commonly known as: LaMICtal   Take 2 Tablets by mouth 2 times a day.  Dose: 50 mg     Melatonin 10 MG Tabs   Take 10 mg by mouth at bedtime.  Dose: 10 mg     methylPREDNISolone 4 MG Tbpk  Commonly known as: Medrol Dosepak   Use as directed on packaging     metoprolol SR 50 MG Tb24  Commonly known as: Toprol XL   Doctor's comments: This a new dose as of 9/4/2024.  Take 1 Tablet by mouth 2 times a day.  Dose: 50 mg     NEXPLANON SC   Inject 1 Application under the skin one time.  Dose: 1 Application      nitroglycerin 0.4 MG Subl  Commonly known as: Nitrostat   Place 1 Tablet under the tongue as needed for Chest Pain (may repeat for chest pain every 5 mins not to exceed 3 doses).  Dose: 0.4 mg     Nurtec 75 MG Tbdp  Generic drug: Rimegepant Sulfate   Take 1 tablet by mouth at onset of migraine or aura okay to repeat in 24 hours if needed.     omeprazole 20 MG delayed-release capsule  Commonly known as: PriLOSEC   Take 2 Capsules by mouth 2 times a day.  Dose: 40 mg     Ranolazine 1000 MG Tb12   Take 1 Tablet by mouth 2 times a day.  Dose: 1,000 mg     scopolamine 1 mg/72hr Pt72  Commonly known as: Transderm Scop (1.5 MG)   Place 1 Patch on the skin every 72 hours.  Dose: 1 Patch     spironolactone 50 MG Tabs  Commonly known as: Aldactone   Take 1 Tablet by mouth every day.  Dose: 50 mg     sumatriptan 20 MG/ACT nasal spray  Commonly known as: Imitrex   Give 1 dose at onset headache okay to repeat in 2 hours if needed for max of 2 doses in a 24 hour timeframe.     tizanidine 4 MG Tabs  Commonly known as: Zanaflex   Take 4 mg by mouth in the morning, at noon, and at bedtime.  Dose: 4 mg     topiramate 50 MG tablet  Commonly known as: Topamax   Doctor's comments: THIS IS A 90 DAY SUPPLY  Take 1 Tablet by mouth 2 times a day.  Dose: 50 mg     ziprasidone 40 MG Caps  Commonly known as: Geodon   Take 1 capsule by mouth at bedtime.  Dose: 40 mg            STOP taking these medications      azithromycin 250 MG Tabs  Commonly known as: Zithromax              Allergies  Allergies   Allergen Reactions    Cefdinir Shortness of Breath and Itching     Tolerated 1/18/17  Tolerates ceftriaxone  Tolerated augmentin 8/2019     Depakote [Divalproex Sodium] Unspecified     Muscle spasms/muscle pain and weakness      Doxycycline Anaphylaxis and Vomiting     Other reaction(s): pustules/blisters  Other reaction(s): stomach pain    Montelukast [Singulair] Unspecified     Cardiac effusion    Vancomycin Itching     Pt becomes flushed in  "face and chest.   RXN=7/10/16    Wound Dressing Adhesive Rash     By pt report-\"removes skin totally off\"    Amitriptyline Unspecified     Headaches      Amoxicillin Rash      Tolerates augmentin    Aripiprazole [Abilify] Unspecified     Headaches/muscle twitching      Clindamycin Nausea         Other reaction(s): nausea stomach pain    Erythromycin Rash     .  Other reaction(s): nausea stomach pain    Flomax [Tamsulosin Hydrochloride] Swelling    Hydromorphone      Other reaction(s): vomiting    Levaquin Unspecified     Severe muscle cramps in legs  RXN=unknown  Other reaction(s): leg muscle cramps    Metformin Unspecified     Increased lactic acid     Other reaction(s): itching and rash/nausea vomiting    Sulfa Drugs Hives, Itching, Myalgia and Unspecified     Muscle pain and weakness    Other reaction(s): unknown    Tamsulosin Swelling     Swelling of legs    Tape Rash     Tears skin off  coban with Tegaderm tape ok intermittently  RXN=ongoing    Sulfamethoxazole W-Trimethoprim Rash    Ciprofloxacin Rash          Keflex Rash     Pt states she gets a rash with this medication  Tolerates ceftriaxone  Can take with Benadryl    Levofloxacin Unspecified     Leg muscle cramps    Metronidazole Rash     \"Vision problems\"  Other reaction(s): vision problems       DIET  No orders of the defined types were placed in this encounter.      ACTIVITY      CONSULTATIONS      PROCEDURES      LABORATORY  Lab Results   Component Value Date    SODIUM 136 04/15/2025    POTASSIUM 4.2 04/15/2025    CHLORIDE 105 04/15/2025    CO2 18 (L) 04/15/2025    GLUCOSE 263 (H) 04/15/2025    BUN 10 04/15/2025    CREATININE 0.77 04/15/2025    CREATININE 0.75 (L) 07/20/2010    GLOMRATE >59 07/20/2010        Lab Results   Component Value Date    WBC 7.6 04/15/2025    WBC 6.1 07/20/2010    HEMOGLOBIN 14.6 04/15/2025    HEMATOCRIT 42.0 04/15/2025    PLATELETCT 171 04/15/2025        Total time of the discharge process exceeds 40 minutes.  "

## 2025-04-17 ENCOUNTER — HOSPITAL ENCOUNTER (OUTPATIENT)
Facility: MEDICAL CENTER | Age: 35
End: 2025-04-17
Attending: STUDENT IN AN ORGANIZED HEALTH CARE EDUCATION/TRAINING PROGRAM
Payer: MEDICARE

## 2025-04-17 ENCOUNTER — OFFICE VISIT (OUTPATIENT)
Dept: CARDIOLOGY | Facility: MEDICAL CENTER | Age: 35
End: 2025-04-17
Attending: INTERNAL MEDICINE
Payer: MEDICARE

## 2025-04-17 ENCOUNTER — OFFICE VISIT (OUTPATIENT)
Dept: MEDICAL GROUP | Facility: MEDICAL CENTER | Age: 35
End: 2025-04-17
Payer: MEDICARE

## 2025-04-17 ENCOUNTER — PHARMACY VISIT (OUTPATIENT)
Dept: PHARMACY | Facility: MEDICAL CENTER | Age: 35
End: 2025-04-17
Payer: COMMERCIAL

## 2025-04-17 VITALS
HEART RATE: 70 BPM | SYSTOLIC BLOOD PRESSURE: 122 MMHG | BODY MASS INDEX: 44.9 KG/M2 | HEIGHT: 64 IN | WEIGHT: 263 LBS | RESPIRATION RATE: 18 BRPM | DIASTOLIC BLOOD PRESSURE: 80 MMHG | OXYGEN SATURATION: 96 %

## 2025-04-17 VITALS
BODY MASS INDEX: 45.14 KG/M2 | OXYGEN SATURATION: 98 % | HEART RATE: 64 BPM | TEMPERATURE: 96.7 F | WEIGHT: 263 LBS | RESPIRATION RATE: 14 BRPM

## 2025-04-17 DIAGNOSIS — G90.A POTS (POSTURAL ORTHOSTATIC TACHYCARDIA SYNDROME): ICD-10-CM

## 2025-04-17 DIAGNOSIS — R07.89 ATYPICAL CHEST PAIN: ICD-10-CM

## 2025-04-17 DIAGNOSIS — M54.41 ACUTE BILATERAL LOW BACK PAIN WITH BILATERAL SCIATICA: ICD-10-CM

## 2025-04-17 DIAGNOSIS — Q79.60 EHLERS-DANLOS SYNDROME: Chronic | ICD-10-CM

## 2025-04-17 DIAGNOSIS — I25.85 CHRONIC CORONARY MICROVASCULAR DYSFUNCTION: ICD-10-CM

## 2025-04-17 DIAGNOSIS — G90.A POSTURAL ORTHOSTATIC TACHYCARDIA SYNDROME: ICD-10-CM

## 2025-04-17 DIAGNOSIS — I10 PRIMARY HYPERTENSION: ICD-10-CM

## 2025-04-17 DIAGNOSIS — Z79.891 CHRONICALLY ON OPIATE THERAPY: ICD-10-CM

## 2025-04-17 DIAGNOSIS — I47.11 INAPPROPRIATE SINUS TACHYCARDIA (HCC): ICD-10-CM

## 2025-04-17 DIAGNOSIS — M54.42 ACUTE BILATERAL LOW BACK PAIN WITH BILATERAL SCIATICA: ICD-10-CM

## 2025-04-17 DIAGNOSIS — M41.9 SCOLIOSIS, UNSPECIFIED SCOLIOSIS TYPE, UNSPECIFIED SPINAL REGION: ICD-10-CM

## 2025-04-17 PROCEDURE — 99212 OFFICE O/P EST SF 10 MIN: CPT | Performed by: INTERNAL MEDICINE

## 2025-04-17 PROCEDURE — RXMED WILLOW AMBULATORY MEDICATION CHARGE: Performed by: INTERNAL MEDICINE

## 2025-04-17 PROCEDURE — 3074F SYST BP LT 130 MM HG: CPT | Performed by: INTERNAL MEDICINE

## 2025-04-17 PROCEDURE — G2211 COMPLEX E/M VISIT ADD ON: HCPCS | Performed by: INTERNAL MEDICINE

## 2025-04-17 PROCEDURE — 99214 OFFICE O/P EST MOD 30 MIN: CPT | Performed by: INTERNAL MEDICINE

## 2025-04-17 PROCEDURE — G0482 DRUG TEST DEF 15-21 CLASSES: HCPCS

## 2025-04-17 PROCEDURE — RXMED WILLOW AMBULATORY MEDICATION CHARGE: Performed by: STUDENT IN AN ORGANIZED HEALTH CARE EDUCATION/TRAINING PROGRAM

## 2025-04-17 PROCEDURE — 3079F DIAST BP 80-89 MM HG: CPT | Performed by: INTERNAL MEDICINE

## 2025-04-17 RX ORDER — IVABRADINE 7.5 MG/1
7.5 TABLET, FILM COATED ORAL 2 TIMES DAILY WITH MEALS
Qty: 180 TABLET | Refills: 3 | Status: SHIPPED | OUTPATIENT
Start: 2025-04-17

## 2025-04-17 RX ORDER — OXYCODONE HYDROCHLORIDE 5 MG/1
5 TABLET ORAL EVERY 6 HOURS PRN
Qty: 120 TABLET | Refills: 0 | Status: SHIPPED | OUTPATIENT
Start: 2025-04-17 | End: 2025-05-17

## 2025-04-17 RX ORDER — METOPROLOL SUCCINATE 50 MG/1
50 TABLET, EXTENDED RELEASE ORAL 2 TIMES DAILY
Qty: 180 TABLET | Refills: 3 | Status: SHIPPED | OUTPATIENT
Start: 2025-04-17

## 2025-04-17 RX ORDER — ISOSORBIDE MONONITRATE 60 MG/1
60 TABLET, EXTENDED RELEASE ORAL EVERY MORNING
Qty: 90 TABLET | Refills: 3 | Status: SHIPPED | OUTPATIENT
Start: 2025-04-17

## 2025-04-17 RX ORDER — AMLODIPINE BESYLATE 5 MG/1
5 TABLET ORAL DAILY
Qty: 90 TABLET | Refills: 3 | Status: SHIPPED | OUTPATIENT
Start: 2025-04-17

## 2025-04-17 RX ORDER — NITROGLYCERIN 400 UG/1
1 SPRAY ORAL PRN
Qty: 12 G | Refills: 11 | Status: SHIPPED | OUTPATIENT
Start: 2025-04-17

## 2025-04-17 RX ORDER — METHOCARBAMOL 500 MG/1
500 TABLET, FILM COATED ORAL 4 TIMES DAILY
Qty: 120 TABLET | Refills: 1 | Status: SHIPPED | OUTPATIENT
Start: 2025-04-17

## 2025-04-17 RX ORDER — OXYCODONE AND ACETAMINOPHEN 5; 325 MG/1; MG/1
1 TABLET ORAL EVERY 6 HOURS PRN
Qty: 120 TABLET | Refills: 0 | Status: SHIPPED | OUTPATIENT
Start: 2025-04-17 | End: 2025-04-17

## 2025-04-17 ASSESSMENT — ENCOUNTER SYMPTOMS
MYALGIAS: 1
NECK PAIN: 1
DIZZINESS: 0
VOMITING: 0
PALPITATIONS: 0
CHILLS: 0
BACK PAIN: 1
SHORTNESS OF BREATH: 0
WHEEZING: 0
FEVER: 0
NAUSEA: 0
WEIGHT LOSS: 0
FALLS: 1
HEADACHES: 0

## 2025-04-17 ASSESSMENT — FIBROSIS 4 INDEX
FIB4 SCORE: 0.88
FIB4 SCORE: 0.88

## 2025-04-17 NOTE — PROGRESS NOTES
Subjective:     CC:     HPI:   Kristin presents today with    PMH   # Ehler Danos syndrome,hypermobile  # HTN  # vasospasm  # Restrictive lung disease (PFT 2019 wo significant bronchodilator response)  - has been on inhalers, with reported improvement  # migraine- ( rimegepant, emgality, topiramate, lamotrigine, sumatriptan),   # TONYA - intolerant of mask  # POTS/ SVT on ivabradine and metoprolol 25mg xr  # hidraadenitis supprativa on humira  # nexplanon  # chronic pain associated with EDS ( gabapentin, ibuprofen, acetaminophen),   # GERD/ gastroparesis related to EDS  # PCOS- spironolactone  # schizo on geodon  # hemorrhoids  # history of idiopathic intracranial hypertension  # hx of transverse myelitis vs functional neurologic disorder  # hx of optic neuritits previously on cellcept ([id number]) - no longer following with neuroophthalmology  # kapil 1:80 (2024), ccp 4 (2024), rheum factor <10 (2024)     Specialist  - cardiology - [facility]   - dermatology- [facility]  - on Humira for hidraadenitis   - gastroenterology - [facility], n[facility], polyp, colonoscopy   - pain management/ neurology - [facility]  - neuromuscular neurology- [facility]  - ophthalmology- [facility] - EDS visual changes     Device  - interstim stimulator  - Hx of l4-l5 fusion      EDS monitoring/ work up  - 2022 Echocardiogram LVEF 55% - aortic root normal for BSA  - 2022 stress test   - follows with ophthalmology  - hx of spine surgery/ fusion   - CT cervical spine- No acute fracture or dislocation seen in the CT scan of the cervical spine.   - EGD- 2017 dysphagia, gastroparesis  - colonoscopy - 5 year recall 2024, due 2029     ==================================     Since last visit   -Podiatry  -Cardiology for inappropriate sinus tachycardia/palpitation  -Follow-up with cardiology 2/11/2025 for palpitation-CT angio was ordered.  Continue ranolazine 500 mg twice daily    Verbal consent was acquired by the patient to use [employer] ambient listening note  generation during this visit Yes   History of Present Illness  The patient is a 34-year-old female who presents for a post-hospital follow-up for lower back pain. She was admitted from 04/10/2025 to 04/15/2025.    A history of Annetta-Danlos syndrome is noted. On the day of presentation, a heavy object was lifted, resulting in a sudden pop in the lower back, specifically in the L4 region. Severe back pain radiated up and down both legs, accompanied by numbness in the groin area. An MRI of the lumbar spine was ordered in the ER, which showed no spinal or neuroforaminal stenosis but did reveal postsurgical changes from anterior fusions without any compression. Pain control was managed with narcotics during the hospital stay.    Currently, pain management includes oxycodone 5 mg three times daily, Robaxin 2000 mg, and Tylenol 1000 mg three times daily. Home health services have been initiated, and physical therapy is scheduled to start for a duration of 9 weeks. Lidocaine patches are ineffective as they do not adhere to the skin. The current pain level is reported as 9 out of 10. Suspected muscle tear is indicated due to difficulty with bending, lifting, twisting, and walking. Sleep is disrupted due to pain. Pain management specialist care is ongoing, with no further treatment options available at this time. Ibuprofen cannot be taken, and aspirin is used for cardiac health. Previous long-term opioid therapy included fentanyl.    PAST SURGICAL HISTORY: Anterior fusions        No problems updated.    Health Maintenance:     ROS:  Review of Systems   Constitutional:  Negative for chills, fever and weight loss.   HENT:  Negative for hearing loss.    Respiratory:  Negative for shortness of breath and wheezing.    Cardiovascular:  Negative for chest pain and palpitations.   Gastrointestinal:  Negative for nausea and vomiting.   Genitourinary:  Negative for frequency and urgency.   Skin:  Negative for rash.   Neurological:   Negative for dizziness and headaches.       Objective:     Exam:  Pulse 64   Temp 35.9 °C (96.7 °F) (Temporal)   Resp 14   Wt 119 kg (263 lb) Comment: 263lb, weighed at hospital per pt  SpO2 98%   BMI 45.14 kg/m²  Body mass index is 45.14 kg/m².    Physical Exam  Constitutional:       Appearance: Normal appearance.   Cardiovascular:      Rate and Rhythm: Normal rate and regular rhythm.      Heart sounds: No murmur heard.  Pulmonary:      Effort: Pulmonary effort is normal.      Breath sounds: Normal breath sounds. No wheezing.   Musculoskeletal:      Cervical back: Normal range of motion and neck supple.   Lymphadenopathy:      Cervical: No cervical adenopathy.   Neurological:      Mental Status: She is alert.         Labs:     Assessment & Plan:     34 y.o. female with the following -     1. Acute bilateral low back pain with bilateral sciatica  ***  - methocarbamol (ROBAXIN) 500 MG Tab; Take 1 Tablet by mouth 4 times a day.  Dispense: 120 Tablet; Refill: 1  - Controlled Substance Treatment Agreement    2. Chronically on opiate therapy  ***  - Controlled Substance Treatment Agreement  - URINE DRUG SCREEN; Future        Return in about 4 weeks (around 5/15/2025) for Lab review, Med check.    Please note that this dictation was created using voice recognition software. I have made every reasonable attempt to correct obvious errors, but I expect that there are errors of grammar and possibly content that I did not discover before finalizing the note.

## 2025-04-17 NOTE — PROGRESS NOTES
Chief Complaint   Patient presents with    Chest Pain    Supraventricular Tachycardia (SVT)    Atrial Fibrillation     F/v dx: Paroxysmal atrial fibrillation (HCC)       Subjective     Kristin Balderrama is a 34 y.o. female who presents today sinus tachycardia, ablation 2016 for sinus node modification for IST (Inappropriate Sinus Tachycardia), IST ablation 2016, POTS on chronic Corlanor and beta-blocker therapy, hypermobility syndrome, TONYA, IIH (idiopathic intracranial hypertension), NPH normal pressure hydrocephalus, optic neuropathy, chronic pain syndrome, bladder incontinence, S/P sacral stimulator, cervical neuralgia with leg weakness, nephrolithiasis S/P kidney stone extraction stent placement 2/14/2024, hip fracture 1/27/2023 H/O psychiatric issues.     Recent PET shows microvascular dysfunction    Recently had back injury and was off her meds and CP owrsened    Has debilitating back issues    Would need PRN NITRO 3 tiems a week     Past Medical History:   Diagnosis Date    Abdominal pain     Anginal syndrome     Random chest pain especially with tachycardia    Apnea, sleep     Arthritis     ASTHMA     when around smoke    Back pain     Borderline personality disorder (HCC)     Chickenpox     Chronic UTI 09/18/2010    Daytime sleepiness     Dental disorder 03/08/2021    Infected tooth    Depression     Diabetes (HCC)     Diarrhea     Incontinece    Disorder of thyroid     Hashimoto's    Fatigue     Frequent headaches     Heart burn     History of falling     Inappropriate sinus tachycardia (HCC) 2016    Statusp post ablation by Dr. Kumar.    Migraine     Mitochondrial disease (HCC)     Multiple personality disorder (HCC)     Obesity     Pain     Back, 7/10    Painful joint     PCOS (polycystic ovarian syndrome)     Pneumonia 2012 12/2020    POTS (postural orthostatic tachycardia syndrome)     Psychosis (HCC)     Ringing in ears     Scoliosis     Shortness of breath     O2 as needed    Sinus tachycardia  "10/31/2013    Sleep apnea     CPAP \"pulmonary doctor took me off mid year 2016\"    Snoring     Supraventricular tachycardia (HCC) 04/10/2019    Transverse myelitis (HCC)     2/8/22: Per pt: not anymore    Tuberculosis     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    Urinary bladder disorder     Suprapubic cath. 2/8/22: Not anymore.     Urinary incontinence     Weakness     Wears glasses      Past Surgical History:   Procedure Laterality Date    AZ CYSTOSCOPY,INSERT URETERAL STENT Right 2/12/2024    Procedure: CYSTOSCOPY, WITH RIGHT URETEROSCOPY, WITH LITHOTRIPSY, WITH INSERTION OF RIGHT URETERAL STENT;  Surgeon: Josafat Roberson M.D.;  Location: SURGERY [facility];  Service: Urology    AZ CYSTO/URETERO/PYELOSCOPY, DX Right 2/12/2024    Procedure: URETEROSCOPY;  Surgeon: Josafat Roberson M.D.;  Location: [facility];  Service: Urology    LASERTRIPSY Right 2/12/2024    Procedure: LITHOTRIPSY, USING LASER;  Surgeon: Josafat Roberson M.D.;  Location: SURGERY [facility];  Service: Urology    LAPAROSCOPIC INGUINAL HERNIA REPAIR Right 07/21/2023    Procedure: LAPAROSCOPIC RIGHT INGUINAL HERNIA REPAIR WITH MESH;  Surgeon: Joe Noyola M.D.;  Location: SURGERY [facility];  Service: General    HERNIA REPAIR Right 07/21/2023    PB PERCUT FIX PBOX/NECK FEMUR FX Left 01/28/2023    Procedure: FIXATION, HIP, USING CANNULATED SCREW;  Surgeon: Noman Abdul M.D.;  Location: SURGERY [facility];  Service: Orthopedics    AZ LAP,DIAGNOSTIC ABDOMEN  02/14/2022    Procedure: LAPAROSCOPY;  Surgeon: Seamus Pisano M.D.;  Location: SURGERY SAME DAY [facility];  Service: Gynecology    OVARIAN CYSTECTOMY Right 02/14/2022    Procedure: EXCISION, CYST, OVARY;  Surgeon: Seamus Pisano M.D.;  Location: SURGERY SAME DAY [facility];  Service: Gynecology    BOWEL STIMULATOR INSERTION  03/10/2021    Procedure: INSERTION, ELECTRODE LEADS AND PULSE GENERATOR, NEUROSTIMULATOR, SACRAL - REMOVAL OF INTERSTIM WITH REPLACEMENT OF SACRAL " NEUROMODULATION DEVICE;  Surgeon: Joe Noyola M.D.;  Location: SURGERY [facility];  Service: General    MUSCLE BIOPSY Right 01/26/2017    Procedure: MUSCLE BIOPSY - THIGH;  Surgeon: Isidro Vigil M.D.;  Location: SURGERY [facility];  Service:     GASTROSCOPY WITH BALLOON DILATATION N/A 01/04/2017    Procedure: GASTROSCOPY WITH DILATATION;  Surgeon: Torres Vargas M.D.;  Location: SURGERY [facility];  Service:     BOWEL STIMULATOR INSERTION  07/13/2016    Procedure: BOWEL STIMULATOR INSERTION FOR PERMANENT INTERSTIM SACRAL IMPLANT;  Surgeon: Joe Noyola M.D.;  Location: SURGERY [facility];  Service:     RECOVERY  01/27/2016    Procedure: CATH LAB EP STUDY WITH SINUS NODE MODIFICATION LA;  Surgeon: Recoveryonly Surgery;  Location: SURGERY PRE-POST PROC UNIT [facility];  Service:     OTHER CARDIAC SURGERY  01/2016    cardiac ablation    NEURO DEST FACET L/S W/IG SNGL  04/21/2015    Performed by Reza Tabor at SURGERY [facility]    LUMBAR FUSION ANTERIOR  08/21/2012    Performed by SUSIE SAWANT at SURGERY [facility]    ALYSSA BY LAPAROSCOPY  08/29/2010    Performed by SHAYY JOHNS at SURGERY [facility]    LAMINOTOMY      OTHER ABDOMINAL SURGERY      TONSILLECTOMY      tonsillectomy     Family History   Problem Relation Age of Onset    Hypertension Mother     Sleep Apnea Mother     Heart Disease Mother     Other Mother         hypothryod    No Known Problems Sister     Other Sister         Narcolepsy;fibromyalsia;bone;nerve    Genitourinary () Problems Sister         endometriosis    Hypertension Maternal Uncle     Heart Disease Maternal Grandmother     Hypertension Maternal Grandmother     Cancer Neg Hx      Social History     Socioeconomic History    Marital status: Single     Spouse name: Not on file    Number of children: Not on file    Years of education: Not on file    Highest education level: Not on file   Occupational History    Not on file   Tobacco Use    Smoking  status: Never    Smokeless tobacco: Never   Vaping Use    Vaping status: Never Used   Substance and Sexual Activity    Alcohol use: No     Alcohol/week: 0.0 oz    Drug use: Never    Sexual activity: Not Currently     Birth control/protection: Implant   Other Topics Concern    Not on file   Social History Narrative    ** Merged History Encounter **          Social Drivers of Health     Financial Resource Strain: Medium Risk (4/30/2021)    Overall Financial Resource Strain (CARDIA)     Difficulty of Paying Living Expenses: Somewhat hard   Food Insecurity: No Food Insecurity (2/15/2025)    Hunger Vital Sign     Worried About Running Out of Food in the Last Year: Never true     Ran Out of Food in the Last Year: Never true   Transportation Needs: No Transportation Needs (2/15/2025)    PRAPARE - Transportation     Lack of Transportation (Medical): No     Lack of Transportation (Non-Medical): No   Physical Activity: Not on file   Stress: Not on file   Social Connections: Not on file   Intimate Partner Violence: Not At Risk (4/10/2025)    Humiliation, Afraid, Rape, and Kick questionnaire     Fear of Current or Ex-Partner: No     Emotionally Abused: No     Physically Abused: No     Sexually Abused: No   Housing Stability: Low Risk  (2/15/2025)    Housing Stability Vital Sign     Unable to Pay for Housing in the Last Year: No     Number of Times Moved in the Last Year: 0     Homeless in the Last Year: No     Allergies   Allergen Reactions    Cefdinir Shortness of Breath and Itching     Tolerated 1/18/17  Tolerates ceftriaxone  Tolerated augmentin 8/2019     Depakote [Divalproex Sodium] Unspecified     Muscle spasms/muscle pain and weakness      Doxycycline Anaphylaxis and Vomiting     Other reaction(s): pustules/blisters  Other reaction(s): stomach pain    Montelukast [Singulair] Unspecified     Cardiac effusion    Vancomycin Itching     Pt becomes flushed in face and chest.   RXN=7/10/16    Wound Dressing Adhesive Rash      "By pt report-\"removes skin totally off\"    Amitriptyline Unspecified     Headaches      Amoxicillin Rash      Tolerates augmentin    Aripiprazole [Abilify] Unspecified     Headaches/muscle twitching      Clindamycin Nausea         Other reaction(s): nausea stomach pain    Erythromycin Rash     .  Other reaction(s): nausea stomach pain    Flomax [Tamsulosin Hydrochloride] Swelling    Hydromorphone      Other reaction(s): vomiting    Levaquin Unspecified     Severe muscle cramps in legs  RXN=unknown  Other reaction(s): leg muscle cramps    Metformin Unspecified     Increased lactic acid     Other reaction(s): itching and rash/nausea vomiting    Sulfa Drugs Hives, Itching, Myalgia and Unspecified     Muscle pain and weakness    Other reaction(s): unknown    Tamsulosin Swelling     Swelling of legs    Tape Rash     Tears skin off  coban with Tegaderm tape ok intermittently  RXN=ongoing    Sulfamethoxazole W-Trimethoprim Rash    Ciprofloxacin Rash          Keflex Rash     Pt states she gets a rash with this medication  Tolerates ceftriaxone  Can take with Benadryl    Levofloxacin Unspecified     Leg muscle cramps    Metronidazole Rash     \"Vision problems\"  Other reaction(s): vision problems     Outpatient Encounter Medications as of 4/17/2025   Medication Sig Dispense Refill    isosorbide mononitrate SR (IMDUR) 60 MG TABLET SR 24 HR Take 1 Tablet by mouth every morning. 90 Tablet 3    ivabradine (CORLANOR) 7.5 MG Tab tablet Take 1 Tablet by mouth 2 times a day with meals. 180 Tablet 3    amLODIPine (NORVASC) 5 MG Tab Take 1 tablet by mouth every day. 90 Tablet 3    metoprolol SR (TOPROL XL) 50 MG TABLET SR 24 HR Take 1 Tablet by mouth 2 times a day. 180 Tablet 3    nitroglycerin (NITROLINGUAL) 0.4 MG/SPRAY Solution Place 1 Spray under the tongue as needed (chest pains). 12 g 11    acetaminophen (TYLENOL) 325 MG Tab Take 2 Tablets by mouth every 6 hours.      calcium carbonate (TUMS) 500 MG Chew Tab Chew 1 Tablet as " needed (GI pain.).      lidocaine (ASPERFLEX) 4 % Patch Place 1 Patch on the skin every 24 hours.      methocarbamol (ROBAXIN) 500 MG Tab Take 1 Tablet by mouth 4 times a day. 120 Tablet 0    ondansetron (ZOFRAN ODT) 4 MG TABLET DISPERSIBLE Dissolve 1 Tablet by mouth every four hours as needed for Nausea/Vomiting (give PO if no IV route available). 10 Tablet 0    oxyCODONE immediate-release (ROXICODONE) 5 MG Tab Take 1 Tablet by mouth every 6 hours as needed for Severe Pain for up to 3 days. 12 Tablet 0    senna-docusate (PERICOLACE OR SENOKOT S) 8.6-50 MG Tab Take 1 Tablet by mouth every day. 30 Tablet 0    Etonogestrel (NEXPLANON SC) Inject 1 Application under the skin one time.      tizanidine (ZANAFLEX) 4 MG Tab Take 4 mg by mouth in the morning, at noon, and at bedtime.      FIBER PO Take 3 Tablets by mouth every day.      albuterol 108 (90 Base) MCG/ACT Aero Soln inhalation aerosol Inhale 1-2 Puffs every four hours as needed for Shortness of Breath.      ipratropium-albuterol (COMBIVENT RESPIMAT)  MCG/ACT Aero Soln Inhale 1 Puff 4 times a day as needed (Shortness of breath).      gabapentin (NEURONTIN) 400 MG Cap TAKE 3 CAPSULES BY MOUTH THREE TIMES DAILY.  FOR 30 DAY SUPPLY. DX: M79.7 (Patient taking differently: Take 1,200 mg by mouth in the morning, at noon, and at bedtime.) 270 Capsule 0    methylPREDNISolone (MEDROL DOSEPAK) 4 MG Tablet Therapy Pack Use as directed on packaging (Patient taking differently: Use as directed on packaging  PRESCRIPTION COURSE WAS COMPLETED) 21 Each 0    dicyclomine (BENTYL) 10 MG Cap Take 1 Capsule by mouth 2 times a day as needed for abdominal cramping/discomfort 60 Capsule 1    Ranolazine 1000 MG TABLET SR 12 HR Take 1 Tablet by mouth 2 times a day. 180 Tablet 3    aspirin 81 MG EC tablet Take 1 Tablet by mouth every day. 100 Tablet 3    docusate sodium (COLACE) 250 MG capsule Take 250 mg by mouth 2 times a day.      nitroglycerin (NITROSTAT) 0.4 MG SL Tab  Place 1 Tablet under the tongue as needed for Chest Pain (may repeat for chest pain every 5 mins not to exceed 3 doses). 25 Tablet 11    spironolactone (ALDACTONE) 50 MG Tab Take 1 Tablet by mouth every day. 90 Tablet 3    omeprazole (PRILOSEC) 20 MG delayed-release capsule Take 2 Capsules by mouth 2 times a day. 180 Capsule 3    topiramate (TOPAMAX) 50 MG tablet Take 1 Tablet by mouth 2 times a day. 180 Tablet 4    scopolamine (TRANSDERM SCOP, 1.5 MG,) 1 mg/72hr PATCH 72 HR Place 1 Patch on the skin every 72 hours. (Patient taking differently: Place 1 Patch on the skin every 72 hours as needed. Indications: Nausea and Vomiting) 7 Patch 1    ziprasidone (GEODON) 40 MG Cap Take 1 capsule by mouth at bedtime. 90 Capsule 1    Galcanezumab-gnlm (EMGALITY) 120 MG/ML Solution Auto-injector Inject 1 mL subcutaneously every month. 1 mL 11    lamoTRIgine (LAMICTAL) 25 MG Tab Take 2 Tablets by mouth 2 times a day. 360 Tablet 1    Rimegepant Sulfate (NURTEC) 75 MG TABLET DISPERSIBLE Take 1 tablet by mouth at onset of migraine or aura okay to repeat in 24 hours if needed. 8 Tablet 5    sumatriptan (IMITREX) 20 MG/ACT nasal spray Give 1 dose at onset headache okay to repeat in 2 hours if needed for max of 2 doses in a 24 hour timeframe. 6 Each 5    diphenhydrAMINE (BENADRYL) 25 MG Tab Take 50 mg by mouth at bedtime.      Melatonin 10 MG Tab Take 10 mg by mouth at bedtime.      [DISCONTINUED] ivabradine (CORLANOR) 7.5 MG Tab tablet Take 1 Tablet by mouth 2 times a day with meals. 90 Tablet 3    [DISCONTINUED] isosorbide mononitrate SR (IMDUR) 30 MG TABLET SR 24 HR Take 1 Tablet by mouth every morning. (Patient taking differently: Take 60 mg by mouth every morning.) 90 Tablet 3    [DISCONTINUED] metoprolol SR (TOPROL XL) 50 MG TABLET SR 24 HR Take 1 Tablet by mouth 2 times a day. 180 Tablet 3    [DISCONTINUED] amLODIPine (NORVASC) 5 MG Tab Take 1 tablet by mouth every day. 90 Tablet 1     No facility-administered encounter  "medications on file as of 4/17/2025.     ROS           Objective     /80 (BP Location: Left arm, Patient Position: Sitting, BP Cuff Size: Adult)   Pulse 70   Resp 18   Ht 1.626 m (5' 4\")   Wt 119 kg (263 lb)   SpO2 96%   BMI 45.14 kg/m²     Physical Exam  Constitutional:       General: She is not in acute distress.     Appearance: She is not diaphoretic.   Eyes:      General: No scleral icterus.  Neck:      Vascular: No JVD.   Cardiovascular:      Rate and Rhythm: Normal rate.      Heart sounds: Normal heart sounds. No murmur heard.     No friction rub. No gallop.   Pulmonary:      Effort: No respiratory distress.      Breath sounds: No wheezing or rales.   Abdominal:      General: Bowel sounds are normal.      Palpations: Abdomen is soft.   Musculoskeletal:      Right lower leg: No edema.      Left lower leg: No edema.   Skin:     Findings: No rash.   Neurological:      Mental Status: She is alert. Mental status is at baseline.   Psychiatric:         Mood and Affect: Mood normal.              Extensive labs and imaging reviewed      Assessment & Plan     1. Atypical chest pain  isosorbide mononitrate SR (IMDUR) 60 MG TABLET SR 24 HR      2. Postural orthostatic tachycardia syndrome  ivabradine (CORLANOR) 7.5 MG Tab tablet      3. Primary hypertension  amLODIPine (NORVASC) 5 MG Tab      4. Inappropriate sinus tachycardia (HCC)  metoprolol SR (TOPROL XL) 50 MG TABLET SR 24 HR      5. Chronic coronary microvascular dysfunction  nitroglycerin (NITROLINGUAL) 0.4 MG/SPRAY Solution      6. POTS (postural orthostatic tachycardia syndrome)            Medical Decision Making: Today's Assessment/Status/Plan:      It was my pleasure to meet with Ms. Balderrama.    We addressed the management of hypertension at today's visit. Blood pressure is well controlled.  We specifically assessed the labs on hypertension treatment    PET shows microvascular disease    Changed to nitrospray    Ms Gold will see her in 3 months " and I will see Ms. Balderrama back in 1 year time and encouraged her to follow up with us over the phone or electronically using my MyChart as issues arise.    It is my pleasure to participate in the care of Ms. Balderrama.  Please do not hesitate to contact me with questions or concerns.    Abhishek Smith MD PhD [facility]  Cardiologist [facility]    Please note that this dictation was created using voice recognition software. There may be errors I did not discover before finalizing the note.

## 2025-04-18 PROCEDURE — RXMED WILLOW AMBULATORY MEDICATION CHARGE: Performed by: NURSE PRACTITIONER

## 2025-04-18 PROCEDURE — RXMED WILLOW AMBULATORY MEDICATION CHARGE: Performed by: SPECIALIST

## 2025-04-18 NOTE — PROGRESS NOTES
Subjective:     Kristin Balderrama is a 34 y.o. female who presents for Hospital Follow-up.    HPI:   Recently hospitalized for     PMH   # Ehler Danos syndrome,hypermobile  # HTN  # vasospasm  # Restrictive lung disease (PFT 2019 wo significant bronchodilator response)  - has been on inhalers, with reported improvement  # migraine- ( rimegepant, emgality, topiramate, lamotrigine, sumatriptan),   # TONYA - intolerant of mask  # POTS/ SVT on ivabradine and metoprolol 25mg xr  # hidraadenitis supprativa on humira  # nexplanon  # chronic pain associated with EDS ( gabapentin, ibuprofen, acetaminophen),   # GERD/ gastroparesis related to EDS  # PCOS- spironolactone  # schizo on geodon  # hemorrhoids  # history of idiopathic intracranial hypertension  # hx of transverse myelitis vs functional neurologic disorder  # hx of optic neuritits previously on cellcept ([id number]) - no longer following with neuroophthalmology  # kapil 1:80 (2024), ccp 4 (2024), rheum factor <10 (2024)     Specialist  - cardiology - [facility]   - dermatology- [facility] dermatology  - on Humira for hidraadenitis   - gastroenterology - [facility], nafld, polyp, colonoscopy   - pain management/ neurology - [facility]  - neuromuscular neurology- [facility]  - ophthalmology- [facility] - EDS visual changes     Device  - interstim stimulator  - Hx of l4-l5 fusion      EDS monitoring/ work up  - 2022 Echocardiogram LVEF 55% - aortic root normal for BSA  - 2022 stress test   - follows with ophthalmology  - hx of spine surgery/ fusion   - CT cervical spine- No acute fracture or dislocation seen in the CT scan of the cervical spine.   - EGD- 2017 dysphagia, gastroparesis  - colonoscopy - 5 year recall 2024, due 2029     ==================================     Verbal consent was acquired by the patient to use [employer] ambient listening note generation during this visit Yes   History of Present Illness  The patient is a 34-year-old female who presents for a post-hospital  follow-up for lower back pain. She was admitted from 04/10/2025 to 04/15/2025.    A history of Annetta-Danlos syndrome is noted. On the day of presentation, a heavy object was lifted, resulting in a sudden pop in the lower back, specifically in the L4 region. Severe back pain radiated up and down both legs, accompanied by numbness in the groin area. An MRI of the lumbar spine was ordered in the ER, which showed no spinal or neuroforaminal stenosis but did reveal postsurgical changes from anterior fusions without any compression. Pain control was managed with narcotics during the hospital stay.    Currently, pain management includes oxycodone 5 mg three times daily, Robaxin 2000 mg, and Tylenol 1000 mg three times daily. Home health services have been initiated, and physical therapy is scheduled to start for a duration of 9 weeks. Lidocaine patches are ineffective as they do not adhere to the skin. The current pain level is reported as 9 out of 10. Suspected muscle tear is indicated due to difficulty with bending, lifting, twisting, and walking. Sleep is disrupted due to pain. Pain management specialist care is ongoing, with no further treatment options available at this time. Ibuprofen cannot be taken, and aspirin is used for cardiac health. Previous long-term opioid therapy included fentanyl.    PAST SURGICAL HISTORY: Anterior fusions  The patient is a 34-year-old female who presents for a post-hospital follow-up for lower back pain. She was admitted from 04/10/2025 to 04/15/2025.    A history of Annetta-Danlos syndrome is noted. On the day of presentation, a heavy object was lifted, resulting in a sudden pop in the lower back, specifically in the L4 region. Severe back pain radiated up and down both legs, accompanied by numbness in the groin area. An MRI of the lumbar spine was ordered in the ER, which showed no spinal or neuroforaminal stenosis but did reveal postsurgical changes from anterior fusions without any  compression. Pain control was managed with narcotics during the hospital stay.    Currently, pain management includes oxycodone 5 mg three times daily, Robaxin 2000 mg, and Tylenol 1000 mg three times daily. Home health services have been initiated, and physical therapy is scheduled to start for a duration of 9 weeks. Lidocaine patches are ineffective as they do not adhere to the skin. The current pain level is reported as 9 out of 10. Suspected muscle tear is indicated due to difficulty with bending, lifting, twisting, and walking. Sleep is disrupted due to pain. Pain management specialist care is ongoing, with no further treatment options available at this time. Ibuprofen cannot be taken, and aspirin is used for cardiac health. Previous long-term opioid therapy included fentanyl.    PAST SURGICAL HISTORY: Anterior fusions      Current medicines (including reconciliation performed today)  Current Outpatient Medications   Medication Sig Dispense Refill    methocarbamol (ROBAXIN) 500 MG Tab Take 1 Tablet by mouth 4 times a day. 120 Tablet 1    oxyCODONE immediate-release (ROXICODONE) 5 MG Tab Take 1 Tablet by mouth every 6 hours as needed for Severe Pain for up to 30 days. 120 Tablet 0    isosorbide mononitrate SR (IMDUR) 60 MG TABLET SR 24 HR Take 1 Tablet by mouth every morning. 90 Tablet 3    ivabradine (CORLANOR) 7.5 MG Tab tablet Take 1 Tablet by mouth 2 times a day with meals. 180 Tablet 3    amLODIPine (NORVASC) 5 MG Tab Take 1 tablet by mouth every day. 90 Tablet 3    metoprolol SR (TOPROL XL) 50 MG TABLET SR 24 HR Take 1 Tablet by mouth 2 times a day. 180 Tablet 3    nitroglycerin (NITROLINGUAL) 0.4 MG/SPRAY Solution Place 1 Spray under the tongue as needed (chest pains). 12 g 11    acetaminophen (TYLENOL) 325 MG Tab Take 2 Tablets by mouth every 6 hours.      ondansetron (ZOFRAN ODT) 4 MG TABLET DISPERSIBLE Dissolve 1 Tablet by mouth every four hours as needed for Nausea/Vomiting (give PO if no IV route  available). 10 Tablet 0    senna-docusate (PERICOLACE OR SENOKOT S) 8.6-50 MG Tab Take 1 Tablet by mouth every day. 30 Tablet 0    Etonogestrel (NEXPLANON SC) Inject 1 Application under the skin one time.      tizanidine (ZANAFLEX) 4 MG Tab Take 4 mg by mouth in the morning, at noon, and at bedtime.      FIBER PO Take 3 Tablets by mouth every day.      albuterol 108 (90 Base) MCG/ACT Aero Soln inhalation aerosol Inhale 1-2 Puffs every four hours as needed for Shortness of Breath.      ipratropium-albuterol (COMBIVENT RESPIMAT)  MCG/ACT Aero Soln Inhale 1 Puff 4 times a day as needed (Shortness of breath).      gabapentin (NEURONTIN) 400 MG Cap TAKE 3 CAPSULES BY MOUTH THREE TIMES DAILY.  FOR 30 DAY SUPPLY. DX: M79.7 (Patient taking differently: Take 1,200 mg by mouth in the morning, at noon, and at bedtime.) 270 Capsule 0    methylPREDNISolone (MEDROL DOSEPAK) 4 MG Tablet Therapy Pack Use as directed on packaging (Patient taking differently: Use as directed on packaging  PRESCRIPTION COURSE WAS COMPLETED) 21 Each 0    dicyclomine (BENTYL) 10 MG Cap Take 1 Capsule by mouth 2 times a day as needed for abdominal cramping/discomfort 60 Capsule 1    Ranolazine 1000 MG TABLET SR 12 HR Take 1 Tablet by mouth 2 times a day. 180 Tablet 3    aspirin 81 MG EC tablet Take 1 Tablet by mouth every day. 100 Tablet 3    docusate sodium (COLACE) 250 MG capsule Take 250 mg by mouth 2 times a day.      nitroglycerin (NITROSTAT) 0.4 MG SL Tab Place 1 Tablet under the tongue as needed for Chest Pain (may repeat for chest pain every 5 mins not to exceed 3 doses). 25 Tablet 11    spironolactone (ALDACTONE) 50 MG Tab Take 1 Tablet by mouth every day. 90 Tablet 3    omeprazole (PRILOSEC) 20 MG delayed-release capsule Take 2 Capsules by mouth 2 times a day. 180 Capsule 3    topiramate (TOPAMAX) 50 MG tablet Take 1 Tablet by mouth 2 times a day. 180 Tablet 4    scopolamine (TRANSDERM SCOP, 1.5 MG,) 1 mg/72hr PATCH 72 HR Place 1  Patch on the skin every 72 hours. (Patient taking differently: Place 1 Patch on the skin every 72 hours as needed. Indications: Nausea and Vomiting) 7 Patch 1    ziprasidone (GEODON) 40 MG Cap Take 1 capsule by mouth at bedtime. 90 Capsule 1    Galcanezumab-gnlm (EMGALITY) 120 MG/ML Solution Auto-injector Inject 1 mL subcutaneously every month. 1 mL 11    lamoTRIgine (LAMICTAL) 25 MG Tab Take 2 Tablets by mouth 2 times a day. 360 Tablet 1    Rimegepant Sulfate (NURTEC) 75 MG TABLET DISPERSIBLE Take 1 tablet by mouth at onset of migraine or aura okay to repeat in 24 hours if needed. 8 Tablet 5    sumatriptan (IMITREX) 20 MG/ACT nasal spray Give 1 dose at onset headache okay to repeat in 2 hours if needed for max of 2 doses in a 24 hour timeframe. 6 Each 5    diphenhydrAMINE (BENADRYL) 25 MG Tab Take 50 mg by mouth at bedtime.      Melatonin 10 MG Tab Take 10 mg by mouth at bedtime.       No current facility-administered medications for this visit.       Allergies:   Cefdinir, Depakote [divalproex sodium], Doxycycline, Montelukast [singulair], Vancomycin, Wound dressing adhesive, Amitriptyline, Amoxicillin, Aripiprazole [abilify], Clindamycin, Erythromycin, Flomax [tamsulosin hydrochloride], Hydromorphone, Levaquin, Metformin, Sulfa drugs, Tamsulosin, Tape, Sulfamethoxazole w-trimethoprim, Ciprofloxacin, Keflex, Levofloxacin, and Metronidazole    Social History     Tobacco Use    Smoking status: Never     Passive exposure: Never    Smokeless tobacco: Never   Vaping Use    Vaping status: Never Used   Substance Use Topics    Alcohol use: No     Alcohol/week: 0.0 oz    Drug use: Never       ROS:  Review of Systems   Constitutional:  Negative for chills, fever, malaise/fatigue and weight loss.   Respiratory:  Negative for shortness of breath.    Cardiovascular:  Negative for chest pain and palpitations.   Musculoskeletal:  Positive for back pain, falls, joint pain, myalgias and neck pain.         Objective:     Vitals:     04/17/25 1643   Pulse: 64   Resp: 14   Temp: 35.9 °C (96.7 °F)   TempSrc: Temporal   SpO2: 98%   Weight: 119 kg (263 lb)     Body mass index is 45.14 kg/m².    Physical Exam:  Physical Exam  Constitutional:       Appearance: Normal appearance.   Cardiovascular:      Rate and Rhythm: Normal rate and regular rhythm.   Pulmonary:      Effort: Pulmonary effort is normal.      Breath sounds: Normal breath sounds.   Musculoskeletal:         General: Tenderness present. No swelling.      Cervical back: Normal range of motion and neck supple.      Comments: Rom limited due to pain.   Antalgic gait    Lymphadenopathy:      Cervical: No cervical adenopathy.   Neurological:      Mental Status: She is alert.          Assessment and Plan:     1. Acute bilateral low back pain with bilateral sciatica  Posthospital visit for acute bilateral lower back pain with bilateral sciatica after lifting a box of water.  MRI imaging did not show s evidence of cord compression.  Patient report has been taking oxycodone IR 5 mg 3 times daily and also acetaminophen up to 3 g daily.  Report lidocaine patches do not stay on her body.  She is also on gabapentin 1200 mg 3 times daily.  She reports she continues have pain 8 out of 10 at this time.  - On presentation patient is wearing a back sprites working with a cane.  - Home health with skilled nursing initiated today pending physical therapy  Plan  Refilled methocarbamol  - Continue acetaminophen 3 g daily  Will send a course of oxycodone 5 mg 4 times daily as patient does not have adequate pain control and will need pain medication for working with physical therapy  - Discussed adverse effect of opiate medication.  She is on multimodal therapy and it has been inadequate.  She understands risk of death with opiate use    - methocarbamol (ROBAXIN) 500 MG Tab; Take 1 Tablet by mouth 4 times a day.  Dispense: 120 Tablet; Refill: 1  - Controlled Substance Treatment Agreement  - oxyCODONE5 MG Tab;  Take 1 Tablet by mouth every 6 hours as needed for Severe Pain (back pain) for up to 30 days.  Dispense: 120 Tablet; Refill: 0    2. Chronically on opiate therapy  Obtained and reviewed patient utilization report from [facility] pharmacy database on 4/17/2025 5:33 PM  prior to writing prescription for controlled substance II, III or IV per [place] bill . Based on assessment of the report, the prescription is medically necessary.    - Controlled Substance Treatment Agreement  - oxyCODONE-acetaminophen (PERCOCET) 5-325 MG Tab; Take 1 Tablet by mouth every 6 hours as needed for Severe Pain (back pain) for up to 30 days.  Dispense: 120 Tablet; Refill: 0  - Pain Management Screen, Urine; Future    3. Annetta-Danlos syndrome  4. Scoliosis, unspecified scoliosis type, unspecified spinal region  Chronic, stable  Patient has multiple MSK/connective tissue disease that likely contributed to her current injury  - oxyCODONE-acetaminophen (PERCOCET) 5-325 MG Tab; Take 1 Tablet by mouth every 6 hours as needed for Severe Pain (back pain) for up to 30 days.  Dispense: 120 Tablet; Refill: 0        - Chart and discharge summary were reviewed.   - Hospitalization and results reviewed with patient.   - Medications reviewed including instructions regarding high risk medications, dosing and side effects.  - Recommended Services: No services needed at this time  - Advance directive/POLST on file?  No     Follow-up:Return in about 4 weeks (around 5/15/2025) for Lab review, Med check.    Face-to-face transitional care management services with [facility] (today's visit is within 14 days post discharge & LACE+ score of 28-58) medical decision complexity were provided.

## 2025-04-21 ENCOUNTER — PATIENT MESSAGE (OUTPATIENT)
Dept: CARDIOLOGY | Facility: MEDICAL CENTER | Age: 35
End: 2025-04-21
Payer: MEDICARE

## 2025-04-22 LAB
1OH-MIDAZOLAM UR QL SCN: NOT DETECTED
6MAM UR QL: NOT DETECTED
7AMINOCLONAZEPAM UR QL: NOT DETECTED
A-OH ALPRAZ UR QL: NOT DETECTED
ALPRAZ UR QL: NOT DETECTED
AMPHET UR QL SCN: NOT DETECTED
ANNOTATION COMMENT IMP: NORMAL
BARBITURATES UR QL: NEGATIVE
BUPRENORPHINE UR QL: NOT DETECTED
BZE UR QL: NEGATIVE
CARBOXYTHC UR QL: NEGATIVE
CARISOPRODOL UR QL: NEGATIVE
CLONAZEPAM UR QL: NOT DETECTED
CODEINE UR QL: NOT DETECTED
CREAT UR-MCNC: 141.6 MG/DL (ref 20–400)
DIAZEPAM UR QL: NOT DETECTED
ETHYL GLUCURONIDE UR QL: NEGATIVE
FENTANYL UR QL: NOT DETECTED
GABAPENTIN UR QL CFM: PRESENT
HYDROCODONE UR QL: NOT DETECTED
HYDROMORPHONE UR QL: NOT DETECTED
LORAZEPAM UR QL: NOT DETECTED
MDA UR QL: NOT DETECTED
MDEA UR QL: NOT DETECTED
MDMA UR QL: NOT DETECTED
ME-PHENIDATE UR QL: NOT DETECTED
METHADONE UR QL: NEGATIVE
METHAMPHET UR QL: NOT DETECTED
MIDAZOLAM UR QL SCN: NOT DETECTED
MORPHINE UR QL: NOT DETECTED
NALOXONE UR QL CFM: NOT DETECTED
NORBUPRENORPHINE UR QL CFM: NOT DETECTED
NORDIAZEPAM UR QL: NOT DETECTED
NORFENTANYL UR QL: NOT DETECTED
NORHYDROCODONE UR QL CFM: NOT DETECTED
NORMEPERIDINE UR QL CFM: NOT DETECTED
NOROXYCODONE UR QL CFM: PRESENT
NOROXYMORPHONE UR QL SCN: NOT DETECTED
OXAZEPAM UR QL: NOT DETECTED
OXYCODONE UR QL: PRESENT
OXYMORPHONE UR QL: PRESENT
PATHOLOGY STUDY: NORMAL
PCP UR QL: NEGATIVE
PHENTERMINE UR QL: NOT DETECTED
PREGABALIN UR QL CFM: NOT DETECTED
SERVICE CMNT-IMP: NORMAL
TAPENTADOL UR QL SCN: NOT DETECTED
TAPENTADOL UR QL SCN: NOT DETECTED
TEMAZEPAM UR QL: PRESENT
TRAMADOL UR QL: NEGATIVE
ZOLPIDEM PHENYL-4-CARB UR QL SCN: NOT DETECTED
ZOLPIDEM UR QL: NOT DETECTED

## 2025-04-23 ENCOUNTER — PHARMACY VISIT (OUTPATIENT)
Dept: PHARMACY | Facility: MEDICAL CENTER | Age: 35
End: 2025-04-23
Payer: COMMERCIAL

## 2025-04-24 ENCOUNTER — RESULTS FOLLOW-UP (OUTPATIENT)
Dept: MEDICAL GROUP | Facility: MEDICAL CENTER | Age: 35
End: 2025-04-24

## 2025-04-30 ENCOUNTER — PHARMACY VISIT (OUTPATIENT)
Dept: PHARMACY | Facility: MEDICAL CENTER | Age: 35
End: 2025-04-30
Payer: COMMERCIAL

## 2025-04-30 ENCOUNTER — OFFICE VISIT (OUTPATIENT)
Dept: MEDICAL GROUP | Facility: MEDICAL CENTER | Age: 35
End: 2025-04-30
Payer: MEDICARE

## 2025-04-30 VITALS
HEART RATE: 73 BPM | OXYGEN SATURATION: 98 % | BODY MASS INDEX: 44.32 KG/M2 | HEIGHT: 64 IN | WEIGHT: 259.6 LBS | TEMPERATURE: 97.7 F | RESPIRATION RATE: 14 BRPM | DIASTOLIC BLOOD PRESSURE: 64 MMHG | SYSTOLIC BLOOD PRESSURE: 108 MMHG

## 2025-04-30 DIAGNOSIS — R29.898 LEFT HAND WEAKNESS: ICD-10-CM

## 2025-04-30 DIAGNOSIS — M79.642 LEFT HAND PAIN: ICD-10-CM

## 2025-04-30 DIAGNOSIS — M54.50 CHRONIC LOW BACK PAIN, UNSPECIFIED BACK PAIN LATERALITY, UNSPECIFIED WHETHER SCIATICA PRESENT: ICD-10-CM

## 2025-04-30 DIAGNOSIS — G89.29 CHRONIC LOW BACK PAIN, UNSPECIFIED BACK PAIN LATERALITY, UNSPECIFIED WHETHER SCIATICA PRESENT: ICD-10-CM

## 2025-04-30 DIAGNOSIS — Z92.89 HISTORY OF SENSORY CHANGES: ICD-10-CM

## 2025-04-30 PROCEDURE — RXMED WILLOW AMBULATORY MEDICATION CHARGE: Performed by: STUDENT IN AN ORGANIZED HEALTH CARE EDUCATION/TRAINING PROGRAM

## 2025-04-30 ASSESSMENT — ENCOUNTER SYMPTOMS
FEVER: 0
WHEEZING: 0
VOMITING: 0
WEIGHT LOSS: 0
SHORTNESS OF BREATH: 0
PALPITATIONS: 0
CHILLS: 0
NAUSEA: 0
DIZZINESS: 0
HEADACHES: 0

## 2025-04-30 ASSESSMENT — FIBROSIS 4 INDEX: FIB4 SCORE: 0.88

## 2025-04-30 NOTE — PROGRESS NOTES
Subjective:     CC: Acute visit for left hand contracture x 4 days    HPI:   Kristin presents today with    PMH   # Ehler Danos syndrome,hypermobile  # HTN  # vasospasm  # Restrictive lung disease (PFT 2019 wo significant bronchodilator response)  - has been on inhalers, with reported improvement  # migraine- ( rimegepant, emgality, topiramate, lamotrigine, sumatriptan),   # TONYA - intolerant of mask  # POTS/ SVT on ivabradine and metoprolol 25mg xr  # hidraadenitis supprativa on humira  # nexplanon  # chronic pain associated with EDS ( gabapentin, ibuprofen, acetaminophen),   # GERD/ gastroparesis related to EDS  # PCOS- spironolactone  # schizo on geodon  # hemorrhoids  # history of idiopathic intracranial hypertension  # hx of transverse myelitis vs functional neurologic disorder  # hx of optic neuritits previously on cellcept ([id number]) - no longer following with neuroophthalmology  # kapil 1:80 (2024), ccp 4 (2024), rheum factor <10 (2024)     Specialist  - cardiology - [facility]   - dermatology- [facility] dermatology  - on Humira for hidraadenitis   - gastroenterology - [facility], nafld, polyp, colonoscopy   - pain management/ neurology - [facility]  - neuromuscular neurology- [facility]  - ophthalmology- [facility] - EDS visual changes     Device  - interstim stimulator  - Hx of l4-l5 fusion      EDS monitoring/ work up  - 2022 Echocardiogram LVEF 55% - aortic root normal for BSA  - 2022 stress test   - follows with ophthalmology  - hx of spine surgery/ fusion   - CT cervical spine- No acute fracture or dislocation seen in the CT scan of the cervical spine.   - EGD- 2017 dysphagia, gastroparesis  - colonoscopy - 5 year recall 2024, due 2029     ==================================      Verbal consent was acquired by the patient to use [employer] ambient listening note generation during this visit Yes     History of Present Illness  The patient presents for a follow-up visit.    She reports an inability to fully open/ close/  extentd her left hand, which she describes as being in a curled position. This issue has been present for approximately 4 days and is accompanied by pain and discomfort when attempting to straighten the hand. Despite efforts to force the hand into an extended position, it reflexively returns to its curled state. Muscle pain is experienced during these attempts. Nerve pain and a prickly sensation in the hand are reported, along with decreased strength. The sensation is described as if an invisible band is preventing movement. No neck pain or injury to the armpit area is reported. There is no bruising, and she does not believe her shoulder is dislocated. No changes have been made to her diet, and she continues her regular gabapentin regimen. No recent injuries have occurred. Methocarbamol and oxycodone have been used to manage symptoms, but no significant changes in condition are noted.    Her back pain has shown gradual improvement. The use of a brace is no longer required. Percocet is taken at night to aid in rest and sleep, and occasionally during the day if the pain intensifies. She is currently in the process of reducing her Percocet dosage.    Headaches have been experienced for several days, localized behind both eyes. She is under the care of a neurologist for this issue and is considering Botox treatment.          Health Maintenance:     ROS:  Review of Systems   Constitutional:  Negative for chills, fever and weight loss.   HENT:  Negative for hearing loss.    Respiratory:  Negative for shortness of breath and wheezing.    Cardiovascular:  Negative for chest pain and palpitations.   Gastrointestinal:  Negative for nausea and vomiting.   Genitourinary:  Negative for frequency and urgency.   Skin:  Negative for rash.   Neurological:  Negative for dizziness and headaches.       Objective:     Exam:  /64 (BP Location: Right arm, Patient Position: Sitting, BP Cuff Size: Large adult)   Pulse 73   Temp 36.5  "°C (97.7 °F) (Temporal)   Resp 14   Ht 1.626 m (5' 4\") Comment: pt reported  Wt 118 kg (259 lb 9.6 oz)   SpO2 98%   BMI 44.56 kg/m²  Body mass index is 44.56 kg/m².    Physical Exam  Neurological:      General: No focal deficit present.      Comments: On examination   Patient weakness in  strength of the left hand, she is unable to keep her 1st and 2nd digit together against resistance.  She is unable to open her fingers against resistance.    She reports change in sensation in the lateral arm to lateral forearm to her hands    There is no lesions noted on the hand    She declined a chaperone.  There is no lesion palpated in the right axillary region               Labs:     Assessment & Plan:     34 y.o. female with the following -     1. Chronic low back pain, unspecified back pain laterality, unspecified whether sciatica present  Chronic is stable  Patient was seen about 2 weeks ago at that point had unexplained sudden onset lower back pain negative MRI.  She was in the buttock brace.  Opiate medication was prescribed.  She is doing exercise.  Reports she has been able to cut down on oxy ir 5mg QID prn to down to 2 at night and 1 during the day.  Overall had improvement.  She is no longer wearing a back brace today. Denies a/e from drug.     2. Left hand pain  3. Left hand weakness  4. History of sensory changes  Acute, etiology unknown unknown prognosis  She presents with 1 week of left hand pain/weakness/sensory changes reporting prickling sensation in the lateral arm, forearm, hand.  - Denies trauma  - Denies trauma to the head, trauma to the neck, axillary abnormalities, trauma to the elbow  - On examination there is decrease in strength patient is unable to open her fingers against resistance,  strength is weak, unable to keep her 1st and 2nd digit close against resistance.  - Noland sign equivocal.  Attempt to check brachial radialis/bicep/tricep however patient report my nerves are sensitive " and there is some variation on exam bilaterally dependent on whether or not patient is focused on exam.  Plan  - Etiology unclear her symptoms seemingly involved multiple nerve both median radial and ulnar nerve.  Patient also reports sensory changes.  Is difficult to localize a lesion that would explain all her symptoms.  Palpation of the left axillary did not reveal any significant mass or abnormality.  Patient denies any trauma to the neck.  She does have some tenderness in the left shoulder.    - We discussed about risk of stroke and to consider ED visit if her symptoms worsens  -We discussed about imaging patient would like to defer imaging  -She does have a history of EDS which may be associated with muscle spasms/muscle contracture          Return if symptoms worsen or fail to improve, for She has a follow-up in the.    Please note that this dictation was created using voice recognition software. I have made every reasonable attempt to correct obvious errors, but I expect that there are errors of grammar and possibly content that I did not discover before finalizing the note.

## 2025-05-01 ENCOUNTER — HOSPITAL ENCOUNTER (EMERGENCY)
Facility: MEDICAL CENTER | Age: 35
End: 2025-05-01
Attending: EMERGENCY MEDICINE
Payer: MEDICARE

## 2025-05-01 VITALS
WEIGHT: 257.06 LBS | DIASTOLIC BLOOD PRESSURE: 73 MMHG | BODY MASS INDEX: 43.89 KG/M2 | RESPIRATION RATE: 14 BRPM | TEMPERATURE: 96.6 F | HEIGHT: 64 IN | SYSTOLIC BLOOD PRESSURE: 124 MMHG | HEART RATE: 66 BPM | OXYGEN SATURATION: 96 %

## 2025-05-01 DIAGNOSIS — R25.2 CRAMPING OF HANDS: ICD-10-CM

## 2025-05-01 PROCEDURE — 99282 EMERGENCY DEPT VISIT SF MDM: CPT

## 2025-05-01 ASSESSMENT — FIBROSIS 4 INDEX: FIB4 SCORE: 0.88

## 2025-05-01 NOTE — ED NOTES
Pt has discharge orders. Pt educated on discharge instructions.  Pt verbalizes understanding.   Pt ambulatory to lobby with steady gait and use of personal cane. Pt going home with self.

## 2025-05-01 NOTE — ED PROVIDER NOTES
ED Provider Note    CHIEF COMPLAINT  Chief Complaint   Patient presents with    Extremity Weakness     Hx of back injury, pt reports leg worsening left leg weakness today.    Migraine     Starting 5 days ago.    Hand Pain     Cramping/contracted x5 days.       EXTERNAL RECORDS REVIEWED  Inpatient Notes I reviewed the discharge summary from April 15, 2025 when the patient was admitted with low back pain and had an MRI that showed postsurgical changes with no foraminal nor spinal stenosis    HPI/ROS  LIMITATION TO HISTORY   Select: : None  OUTSIDE HISTORIAN(S):  none    Kristin Balderrama is a 34 y.o. female who presents with past medical history significant for migraine headaches, multiple personality disorder, she has had low back surgery, she had a recent admission 2 weeks ago for leg weakness and had an MRI that showed no evidence of spinal cord compression, today she presents stating that her left hand is cramping.  This has been intermittent over the last 5 days.    PAST MEDICAL HISTORY   has a past medical history of Abdominal pain, Anginal syndrome, Apnea, sleep, Arthritis, ASTHMA, Back pain, Borderline personality disorder ([facility]), Chickenpox, Chronic UTI (09/18/2010), Daytime sleepiness, Dental disorder (03/08/2021), Depression, Diabetes ([facility]), Diarrhea, Disorder of thyroid, Fatigue, Frequent headaches, Heart burn, History of falling, Inappropriate sinus tachycardia ([facility]) (2016), Migraine, Mitochondrial disease ([facility]), Multiple personality disorder ([facility]), Obesity, Pain, Painful joint, PCOS (polycystic ovarian syndrome), Pneumonia (2012 12/2020), POTS (postural orthostatic tachycardia syndrome), Psychosis ([facility]), Ringing in ears, Scoliosis, Shortness of breath, Sinus tachycardia (10/31/2013), Sleep apnea, Snoring, Supraventricular tachycardia (HCC) (04/10/2019), Transverse myelitis ([facility]), Tuberculosis, Urinary bladder disorder, Urinary incontinence, Weakness, and Wears glasses.    SURGICAL HISTORY   has a past  surgical history that includes neuro dest facet l/s w/ig sngl (04/21/2015); recovery (01/27/2016); [place] by laparoscopy (08/29/2010); lumbar fusion anterior (08/21/2012); other cardiac surgery (01/2016); tonsillectomy; bowel stimulator insertion (07/13/2016); gastroscopy with balloon dilatation (N/A, 01/04/2017); muscle biopsy (Right, 01/26/2017); other abdominal surgery; laminotomy; bowel stimulator insertion (03/10/2021); lap,diagnostic abdomen (02/14/2022); ovarian cystectomy (Right, 02/14/2022); percut fix prox/neck femur fx (Left, 01/28/2023); laparoscopic inguinal hernia repair (Right, 07/21/2023); hernia repair (Right, 07/21/2023); cystoscopy,insert ureteral stent (Right, 2/12/2024); cysto/uretero/pyeloscopy, dx (Right, 2/12/2024); and lasertripsy (Right, 2/12/2024).    FAMILY HISTORY  Family History   Problem Relation Age of Onset    Hypertension Mother     Sleep Apnea Mother     Heart Disease Mother     Other Mother         hypothryod    No Known Problems Sister     Other Sister         Narcolepsy;fibromyalsia;bone;nerve    Genitourinary () Problems Sister         endometriosis    Hypertension Maternal Uncle     Heart Disease Maternal Grandmother     Hypertension Maternal Grandmother     Cancer Neg Hx        SOCIAL HISTORY  Social History     Tobacco Use    Smoking status: Never     Passive exposure: Never    Smokeless tobacco: Never   Vaping Use    Vaping status: Never Used   Substance and Sexual Activity    Alcohol use: No     Alcohol/week: 0.0 oz    Drug use: Never    Sexual activity: Not Currently     Birth control/protection: Implant       CURRENT MEDICATIONS  Home Medications       Reviewed by Jesus Carbone, Student (Nurse Apprentice) on 05/01/25 at 0736  Med List Status: Partial     Medication Last Dose Status   acetaminophen (TYLENOL) 325 MG Tab  Active   albuterol 108 (90 Base) MCG/ACT Aero Soln inhalation aerosol  Active   amLODIPine (NORVASC) 5 MG Tab  Active   aspirin 81 MG EC  tablet  Active   dicyclomine (BENTYL) 10 MG Cap  Active   diphenhydrAMINE (BENADRYL) 25 MG Tab  Active   docusate sodium (COLACE) 250 MG capsule  Active   Etonogestrel (NEXPLANON SC)  Active   FIBER PO  Active   gabapentin (NEURONTIN) 400 MG Cap  Active   Galcanezumab-gnlm (EMGALITY) 120 MG/ML Solution Auto-injector  Active   ipratropium-albuterol (COMBIVENT RESPIMAT)  MCG/ACT Aero Soln  Active   isosorbide mononitrate SR (IMDUR) 60 MG TABLET SR 24 HR  Active   ivabradine (CORLANOR) 7.5 MG Tab tablet  Active   lamoTRIgine (LAMICTAL) 25 MG Tab  Active   Melatonin 10 MG Tab  Active   methocarbamol (ROBAXIN) 500 MG Tab  Active   methocarbamol (ROBAXIN) 500 MG Tab  Active   methylPREDNISolone (MEDROL DOSEPAK) 4 MG Tablet Therapy Pack  Active   metoprolol SR (TOPROL XL) 50 MG TABLET SR 24 HR  Active   nitroglycerin (NITROLINGUAL) 0.4 MG/SPRAY Solution  Active   nitroglycerin (NITROSTAT) 0.4 MG SL Tab  Active   omeprazole (PRILOSEC) 20 MG delayed-release capsule  Active   ondansetron (ZOFRAN ODT) 4 MG TABLET DISPERSIBLE  Active   oxyCODONE immediate-release (ROXICODONE) 5 MG Tab  Active   Ranolazine 1000 MG TABLET SR 12 HR  Active   Rimegepant Sulfate (NURTEC) 75 MG TABLET DISPERSIBLE  Active   scopolamine (TRANSDERM SCOP, 1.5 MG,) 1 mg/72hr PATCH 72 HR  Active   senna-docusate (PERICOLACE OR SENOKOT S) 8.6-50 MG Tab  Active   spironolactone (ALDACTONE) 50 MG Tab  Active   sumatriptan (IMITREX) 20 MG/ACT nasal spray  Active   tizanidine (ZANAFLEX) 4 MG Tab  Active   topiramate (TOPAMAX) 50 MG tablet  Active   ziprasidone (GEODON) 40 MG Cap  Active                    ALLERGIES  Allergies   Allergen Reactions    Cefdinir Shortness of Breath and Itching     Tolerated 1/18/17  Tolerates ceftriaxone  Tolerated augmentin 8/2019     Depakote [Divalproex Sodium] Unspecified     Muscle spasms/muscle pain and weakness      Doxycycline Anaphylaxis and Vomiting     Other reaction(s): pustules/blisters  Other reaction(s):  "stomach pain    Montelukast [Singulair] Unspecified     Cardiac effusion    Vancomycin Itching     Pt becomes flushed in face and chest.   RXN=7/10/16    Wound Dressing Adhesive Rash     By pt report-\"removes skin totally off\"    Amitriptyline Unspecified     Headaches      Amoxicillin Rash      Tolerates augmentin    Aripiprazole [Abilify] Unspecified     Headaches/muscle twitching      Clindamycin Nausea         Other reaction(s): nausea stomach pain    Erythromycin Rash     .  Other reaction(s): nausea stomach pain    Flomax [Tamsulosin Hydrochloride] Swelling    Hydromorphone      Other reaction(s): vomiting    Levaquin Unspecified     Severe muscle cramps in legs  RXN=unknown  Other reaction(s): leg muscle cramps    Metformin Unspecified     Increased lactic acid     Other reaction(s): itching and rash/nausea vomiting    Sulfa Drugs Hives, Itching, Myalgia and Unspecified     Muscle pain and weakness    Other reaction(s): unknown    Tamsulosin Swelling     Swelling of legs    Tape Rash     Tears skin off  coban with Tegaderm tape ok intermittently  RXN=ongoing    Sulfamethoxazole W-Trimethoprim Rash    Ciprofloxacin Rash          Keflex Rash     Pt states she gets a rash with this medication  Tolerates ceftriaxone  Can take with Benadryl    Levofloxacin Unspecified     Leg muscle cramps    Metronidazole Rash     \"Vision problems\"  Other reaction(s): vision problems       PHYSICAL EXAM  VITAL SIGNS: /73   Pulse 66   Temp 35.9 °C (96.6 °F) (Temporal)   Resp 14   Ht 1.626 m (5' 4\")   Wt 117 kg (257 lb 0.9 oz)   SpO2 96%   BMI 44.12 kg/m²      Constitutional: Alert.  HENT: No signs of trauma, Bilateral external ears normal, Nose normal.   Eyes: Pupils are equal and reactive, Conjunctiva normal, Non-icteric.   Neck: Normal range of motion, No tenderness, Supple, No stridor.   Lymphatic: No lymphadenopathy noted.   Cardiovascular: Regular rate and rhythm, no murmurs.   Thorax & Lungs: Normal breath " sounds, No respiratory distress, No wheezing, No chest tenderness.   Abdomen: Bowel sounds normal, Soft, No tenderness, No peritoneal signs, No masses, No pulsatile masses.   Skin: Warm, Dry, No erythema, No rash.   Extremities: Intact distal pulses, No edema, No tenderness, No cyanosis.  Musculoskeletal: Good range of motion in all major joints. No tenderness to palpation or major deformities noted.  The patient's left hand is flexed.  I am able to open the hand, she has no evidence of vascular or neurologic abnormality.  Neurologic: Alert , Normal motor function, Normal sensory function, No focal deficits noted.   Psychiatric: Affect normal, Judgment normal, Mood normal.             COURSE & MEDICAL DECISION MAKING    ASSESSMENT, COURSE AND PLAN  Care Narrative:     Patient presents with left hand cramping, she has had multiple MRI studies in the past most recently low back that was negative for acute disease.  Currently the patient has no evidence of stroke, left hand cramping which is very nonspecific.  At this time I will refer the patient back to her primary care doctor.  She will return for any worsening symptoms.            ADDITIONAL PROBLEMS MANAGED  Left hand cramping    DISPOSITION AND DISCUSSIONS  I have discussed management of the patient with the following physicians and ARIES's: None    Discussion of management with other [facility] [place] or appropriate source(s): None     Escalation of care considered, and ultimately not performed:diagnostic imaging    Barriers to care at this time, including but not limited to:  None .     Decision tools and prescription drugs considered including, but not limited to:  Instructions to follow-up with her doctor .    The patient will return for new or worsening symptoms and is stable at the time of discharge.    The patient is referred to a primary physician for blood pressure management, diabetic screening, and for all other preventative health  concerns.        DISPOSITION:  Patient will be discharged home in stable condition.    FOLLOW UP:  [facility], Emergency Dept  [address] [facility] [address] [phone]    If symptoms worsen    Fly Goodson M.D.  [address] [place] [id number] [phone]      As needed      OUTPATIENT MEDICATIONS:  New Prescriptions    No medications on file         FINAL DIAGNOSIS  1. Cramping of hands         Electronically signed by: Francois Santiago M.D., 5/1/2025 8:15 AM

## 2025-05-01 NOTE — ED TRIAGE NOTES
"Chief Complaint   Patient presents with    Extremity Weakness     Hx of back injury, pt reports leg worsening left leg weakness today.    Migraine     Starting 5 days ago.    Hand Pain     Cramping/contracted x5 days.     Ambulatory to triage for above complaint. Pt reports worsening weakness in leg upon waking this morning, no other neuro deficits noted upon exam.    Pt is alert and oriented, speaking in full sentences, follows commands and responds appropriately to questions. NAD. Resp are even and unlabored.   Pt placed in lobby. Pt educated on triage process. Pt encouraged to alert staff for any changes.    BP (!) 194/84   Pulse 77   Temp 35.9 °C (96.6 °F) (Temporal)   Resp 16   Ht 1.626 m (5' 4\")   Wt 117 kg (257 lb 0.9 oz)   SpO2 98%   BMI 44.12 kg/m²     "

## 2025-05-01 NOTE — ED NOTES
Pt ambulatory from [facility] with use of personal cane. Pt changed into gown and connected to monitor.  Call light within reach.

## 2025-05-07 PROCEDURE — RXMED WILLOW AMBULATORY MEDICATION CHARGE: Performed by: NURSE PRACTITIONER

## 2025-05-08 ENCOUNTER — OFFICE VISIT (OUTPATIENT)
Dept: CARDIOLOGY | Facility: MEDICAL CENTER | Age: 35
End: 2025-05-08
Payer: MEDICARE

## 2025-05-08 VITALS
HEART RATE: 67 BPM | DIASTOLIC BLOOD PRESSURE: 82 MMHG | OXYGEN SATURATION: 96 % | BODY MASS INDEX: 44.22 KG/M2 | HEIGHT: 64 IN | RESPIRATION RATE: 14 BRPM | SYSTOLIC BLOOD PRESSURE: 128 MMHG | WEIGHT: 259 LBS

## 2025-05-08 DIAGNOSIS — G90.A POTS (POSTURAL ORTHOSTATIC TACHYCARDIA SYNDROME): ICD-10-CM

## 2025-05-08 DIAGNOSIS — R07.89 OTHER CHEST PAIN: ICD-10-CM

## 2025-05-08 DIAGNOSIS — I47.11 INAPPROPRIATE SINUS TACHYCARDIA (HCC): ICD-10-CM

## 2025-05-08 DIAGNOSIS — I47.10 SVT (SUPRAVENTRICULAR TACHYCARDIA) (HCC): ICD-10-CM

## 2025-05-08 DIAGNOSIS — I25.85 CHRONIC CORONARY MICROVASCULAR DYSFUNCTION: ICD-10-CM

## 2025-05-08 PROCEDURE — 3074F SYST BP LT 130 MM HG: CPT

## 2025-05-08 PROCEDURE — 99212 OFFICE O/P EST SF 10 MIN: CPT

## 2025-05-08 PROCEDURE — 3079F DIAST BP 80-89 MM HG: CPT

## 2025-05-08 PROCEDURE — 99214 OFFICE O/P EST MOD 30 MIN: CPT

## 2025-05-08 ASSESSMENT — FIBROSIS 4 INDEX: FIB4 SCORE: 0.88

## 2025-05-08 ASSESSMENT — ENCOUNTER SYMPTOMS
ORTHOPNEA: 0
SHORTNESS OF BREATH: 0
SYNCOPE: 0
PALPITATIONS: 0
PND: 0
LIGHT-HEADEDNESS: 0
DYSPNEA ON EXERTION: 0
NEAR-SYNCOPE: 0
DIZZINESS: 0
HEADACHES: 0

## 2025-05-08 NOTE — PROGRESS NOTES
Chief Complaint   Patient presents with    Follow-Up     FV DX:Inappropriate sinus tachycardia          Subjective:   Kristin Balderrama is a 33 y.o. female who presents today for follow-up.     Patient of Dr. Smith.  Current medical problems include sinus tachycardia, ablation 2016 for sinus node modification for IST (Inappropriate Sinus Tachycardia), IST ablation 2016, POTS on chronic Corlanor and beta-blocker therapy, hypermobility syndrome, TONYA, IIH (idiopathic intracranial hypertension), NPH normal pressure hydrocephalus, optic neuropathy, chronic pain syndrome, bladder incontinence, S/P sacral stimulator, cervical neuralgia with leg weakness, nephrolithiasis S/P kidney stone extraction stent placement 2/14/2024, hip fracture 1/27/2023 H/O psychiatric issues. Their last clinic visit was 4/17/2025 with Dr. Smith    Interval history:  At that last follow up patient has had multiple ED visit for continued chest pain. During her Ed workup her troponin and cardiac markers are negative. A stress test was ordered due to risk and continued chest pain. It did show apical wall, apical septum ischemia, summed stress difference score is 6.     Patient presented to the ED on 2/15/2025 for chest pain. After consultation with cardiology she did agree to move forward with cardiac catheterization. Patient underwent LHC with Dr. Bryatn on 2/17/2025 which did not show any obstructive CAD. Recommendation for an outpatient cardiac CT for assessment of microvascular disease.    Patient has had two more ED visit for chest pain. Troponin negative and EKG negative for any ischemic changes.     Today's visit:  Reports since last follow-up with Dr. Smith from a cardiac perspective she has been stable.  She takes her medications as prescribed and does get some chest pain with exertion but goes away with rest.  She does take nitroglycerin which does help symptoms.  She does experience dizziness and near syncope with the chest pain.  She denies shortness of breath, edema, PND, orthopnea, or syncope. She denies any elevated blood pressures.  She had hurt her back recently due to caring heavy water bottles and is on pain management for that and seeing a doctor but no other concerns.  She still does have stress related taking helping care for her grandmother and work but overall reports doing well.     Cardiovascular Risk Factors:  1. Smoking status: Never  2. Type II Diabetes Mellitus: No   Lab Results   Component Value Date/Time    HBA1C 5.8 02/18/2025 05:02 PM    HBA1C 6.0 (A) 09/23/2024 12:11 PM     3. Hypertension: Yes  4. Dyslipidemia: No   Cholesterol,Tot   Date Value Ref Range Status   01/20/2021 172 100 - 199 mg/dL Final     LDL   Date Value Ref Range Status   01/20/2021 98 <100 mg/dL Final     HDL   Date Value Ref Range Status   01/20/2021 48 >=40 mg/dL Final     Triglycerides   Date Value Ref Range Status   01/20/2021 130 0 - 149 mg/dL Final       Past Medical History:   Diagnosis Date    Abdominal pain     Anginal syndrome     Random chest pain especially with tachycardia    Apnea, sleep     Arthritis     ASTHMA     when around smoke    Back pain     Borderline personality disorder (HCC)     Chickenpox     Chronic UTI 09/18/2010    Daytime sleepiness     Dental disorder 03/08/2021    Infected tooth    Depression     Diabetes (HCC)     Diarrhea     Incontinece    Disorder of thyroid     Hashimoto's    Fatigue     Frequent headaches     Heart burn     History of falling     Inappropriate sinus tachycardia (HCC) 2016    Statusp post ablation by Dr. Kumar.    Migraine     Mitochondrial disease (HCC)     Multiple personality disorder (HCC)     Obesity     Pain     Back, 7/10    Painful joint     PCOS (polycystic ovarian syndrome)     Pneumonia 2012 12/2020    POTS (postural orthostatic tachycardia syndrome)     Psychosis (HCC)     Ringing in ears     Scoliosis     Shortness of breath     O2 as needed    Sinus tachycardia 10/31/2013    Sleep  "apnea     CPAP \"pulmonary doctor took me off mid year 2016\"    Snoring     Supraventricular tachycardia (HCC) 04/10/2019    Transverse myelitis (HCC)     2/8/22: Per pt: not anymore    Tuberculosis     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    Urinary bladder disorder     Suprapubic cath. 2/8/22: Not anymore.     Urinary incontinence     Weakness     Wears glasses          Family History   Problem Relation Age of Onset    Hypertension Mother     Sleep Apnea Mother     Heart Disease Mother     Other Mother         hypothryod    No Known Problems Sister     Other Sister         Narcolepsy;fibromyalsia;bone;nerve    Genitourinary () Problems Sister         endometriosis    Hypertension Maternal Uncle     Heart Disease Maternal Grandmother     Hypertension Maternal Grandmother     Cancer Neg Hx          Social History     Tobacco Use    Smoking status: Never     Passive exposure: Never    Smokeless tobacco: Never   Vaping Use    Vaping status: Never Used   Substance Use Topics    Alcohol use: No     Alcohol/week: 0.0 oz    Drug use: Never         Allergies   Allergen Reactions    Cefdinir Shortness of Breath and Itching     Tolerated 1/18/17  Tolerates ceftriaxone  Tolerated augmentin 8/2019     Depakote [Divalproex Sodium] Unspecified     Muscle spasms/muscle pain and weakness      Doxycycline Anaphylaxis and Vomiting     Other reaction(s): pustules/blisters  Other reaction(s): stomach pain    Montelukast [Singulair] Unspecified     Cardiac effusion    Vancomycin Itching     Pt becomes flushed in face and chest.   RXN=7/10/16    Wound Dressing Adhesive Rash     By pt report-\"removes skin totally off\"    Amitriptyline Unspecified     Headaches      Amoxicillin Rash      Tolerates augmentin    Aripiprazole [Abilify] Unspecified     Headaches/muscle twitching      Clindamycin Nausea         Other reaction(s): nausea stomach pain    Erythromycin Rash     .  Other reaction(s): nausea stomach pain    Flomax " "[Tamsulosin Hydrochloride] Swelling    Hydromorphone      Other reaction(s): vomiting    Levaquin Unspecified     Severe muscle cramps in legs  RXN=unknown  Other reaction(s): leg muscle cramps    Metformin Unspecified     Increased lactic acid     Other reaction(s): itching and rash/nausea vomiting    Sulfa Drugs Hives, Itching, Myalgia and Unspecified     Muscle pain and weakness    Other reaction(s): unknown    Tamsulosin Swelling     Swelling of legs    Tape Rash     Tears skin off  coban with Tegaderm tape ok intermittently  RXN=ongoing    Sulfamethoxazole W-Trimethoprim Rash    Ciprofloxacin Rash          Keflex Rash     Pt states she gets a rash with this medication  Tolerates ceftriaxone  Can take with Benadryl    Levofloxacin Unspecified     Leg muscle cramps    Metronidazole Rash     \"Vision problems\"  Other reaction(s): vision problems         Current Outpatient Medications   Medication Sig    polyethylene glycol-electrolytes (GOLYTELY) 236 GM Recon Soln Follow GI Consultants instructions handout    gabapentin (NEURONTIN) 400 MG Cap TAKE 3 CAPSULES BY MOUTH THREE TIMES DAILY.  FOR 30 DAY SUPPLY. DX: M79.7    methocarbamol (ROBAXIN) 500 MG Tab Take 1 Tablet by mouth 4 times a day.    isosorbide mononitrate SR (IMDUR) 60 MG TABLET SR 24 HR Take 1 Tablet by mouth every morning.    ivabradine (CORLANOR) 7.5 MG Tab tablet Take 1 Tablet by mouth 2 times a day with meals.    amLODIPine (NORVASC) 5 MG Tab Take 1 tablet by mouth every day.    metoprolol SR (TOPROL XL) 50 MG TABLET SR 24 HR Take 1 Tablet by mouth 2 times a day.    nitroglycerin (NITROLINGUAL) 0.4 MG/SPRAY Solution Place 1 Spray under the tongue as needed (chest pains).    methocarbamol (ROBAXIN) 500 MG Tab Take 1 Tablet by mouth 4 times a day.    oxyCODONE immediate-release (ROXICODONE) 5 MG Tab Take 1 Tablet by mouth every 6 hours as needed for Severe Pain for up to 30 days.    acetaminophen (TYLENOL) 325 MG Tab Take 2 Tablets by mouth " every 6 hours.    ondansetron (ZOFRAN ODT) 4 MG TABLET DISPERSIBLE Dissolve 1 Tablet by mouth every four hours as needed for Nausea/Vomiting (give PO if no IV route available).    senna-docusate (PERICOLACE OR SENOKOT S) 8.6-50 MG Tab Take 1 Tablet by mouth every day.    Etonogestrel (NEXPLANON SC) Inject 1 Application under the skin one time.    tizanidine (ZANAFLEX) 4 MG Tab Take 4 mg by mouth in the morning, at noon, and at bedtime.    FIBER PO Take 3 Tablets by mouth every day.    albuterol 108 (90 Base) MCG/ACT Aero Soln inhalation aerosol Inhale 1-2 Puffs every four hours as needed for Shortness of Breath.    ipratropium-albuterol (COMBIVENT RESPIMAT)  MCG/ACT Aero Soln Inhale 1 Puff 4 times a day as needed (Shortness of breath).    dicyclomine (BENTYL) 10 MG Cap Take 1 Capsule by mouth 2 times a day as needed for abdominal cramping/discomfort    Ranolazine 1000 MG TABLET SR 12 HR Take 1 Tablet by mouth 2 times a day.    aspirin 81 MG EC tablet Take 1 Tablet by mouth every day.    docusate sodium (COLACE) 250 MG capsule Take 250 mg by mouth 2 times a day.    nitroglycerin (NITROSTAT) 0.4 MG SL Tab Place 1 Tablet under the tongue as needed for Chest Pain (may repeat for chest pain every 5 mins not to exceed 3 doses).    spironolactone (ALDACTONE) 50 MG Tab Take 1 Tablet by mouth every day.    omeprazole (PRILOSEC) 20 MG delayed-release capsule Take 2 Capsules by mouth 2 times a day.    topiramate (TOPAMAX) 50 MG tablet Take 1 Tablet by mouth 2 times a day.    scopolamine (TRANSDERM SCOP, 1.5 MG,) 1 mg/72hr PATCH 72 HR Place 1 Patch on the skin every 72 hours. (Patient taking differently: Place 1 Patch on the skin every 72 hours as needed. Indications: Nausea and Vomiting)    ziprasidone (GEODON) 40 MG Cap Take 1 capsule by mouth at bedtime.    Galcanezumab-gnlm (EMGALITY) 120 MG/ML Solution Auto-injector Inject 1 mL subcutaneously every month.    lamoTRIgine (LAMICTAL) 25 MG Tab Take 2 Tablets by mouth 2  "times a day.    Rimegepant Sulfate (NURTEC) 75 MG TABLET DISPERSIBLE Take 1 tablet by mouth at onset of migraine or aura okay to repeat in 24 hours if needed.    sumatriptan (IMITREX) 20 MG/ACT nasal spray Give 1 dose at onset headache okay to repeat in 2 hours if needed for max of 2 doses in a 24 hour timeframe.    diphenhydrAMINE (BENADRYL) 25 MG Tab Take 50 mg by mouth at bedtime.    Melatonin 10 MG Tab Take 10 mg by mouth at bedtime.    methylPREDNISolone (MEDROL DOSEPAK) 4 MG Tablet Therapy Pack Use as directed on packaging (Patient not taking: Reported on 5/8/2025)         Review of Systems   Constitutional: Positive for malaise/fatigue.   Cardiovascular:  Positive for chest pain. Negative for dyspnea on exertion, leg swelling, near-syncope, orthopnea, palpitations, paroxysmal nocturnal dyspnea and syncope.   Respiratory:  Negative for shortness of breath.    Neurological:  Negative for dizziness, headaches and light-headedness.           Objective:   /82 (BP Location: Right arm, Patient Position: Sitting, BP Cuff Size: Adult long)   Pulse 67   Resp 14   Ht 1.626 m (5' 4\")   Wt 117 kg (259 lb)   SpO2 96%  Body mass index is 44.46 kg/m².         Physical Exam  Vitals reviewed.   Constitutional:       General: She is not in acute distress.     Appearance: She is obese.   HENT:      Head: Normocephalic and atraumatic.   Cardiovascular:      Rate and Rhythm: Normal rate and regular rhythm.      Pulses: Normal pulses.      Heart sounds: No murmur heard.  Pulmonary:      Effort: Pulmonary effort is normal. No respiratory distress.      Breath sounds: Normal breath sounds.   Musculoskeletal:      Right lower leg: No edema.      Left lower leg: No edema.   Neurological:      Mental Status: She is alert and oriented to person, place, and time.      Gait: Gait normal.   Psychiatric:         Behavior: Behavior normal.             Lab Results   Component Value Date/Time    SODIUM 136 04/15/2025 02:43 AM    " POTASSIUM 4.2 04/15/2025 02:43 AM    CHLORIDE 105 04/15/2025 02:43 AM    CO2 18 (L) 04/15/2025 02:43 AM    GLUCOSE 263 (H) 04/15/2025 02:43 AM    BUN 10 04/15/2025 02:43 AM    CREATININE 0.77 04/15/2025 02:43 AM    CREATININE 0.75 (L) 07/20/2010 11:00 AM    BUNCREATRAT 20.0 01/22/2025 05:10 PM    BUNCREATRAT 19 07/20/2010 11:00 AM    GLOMRATE >59 07/20/2010 11:00 AM      Lab Results   Component Value Date/Time    WBC 7.6 04/15/2025 04:22 AM    WBC 6.1 07/20/2010 11:00 AM    RBC 4.60 04/15/2025 04:22 AM    RBC 4.38 07/20/2010 11:00 AM    HEMOGLOBIN 14.6 04/15/2025 04:22 AM    HEMATOCRIT 42.0 04/15/2025 04:22 AM    MCV 91.3 04/15/2025 04:22 AM    MCV 93 07/20/2010 11:00 AM    MCH 31.7 04/15/2025 04:22 AM    MCH 30.1 07/20/2010 11:00 AM    MCHC 34.8 04/15/2025 04:22 AM    MPV 11.4 04/15/2025 04:22 AM    NEUTSPOLYS 69.30 04/10/2025 10:55 AM    LYMPHOCYTES 19.60 (L) 04/10/2025 10:55 AM    MONOCYTES 8.80 04/10/2025 10:55 AM    EOSINOPHILS 1.50 04/10/2025 10:55 AM    BASOPHILS 0.40 04/10/2025 10:55 AM    ANISOCYTOSIS 1+ 10/05/2024 04:35 AM      Lab Results   Component Value Date/Time    CHOLSTRLTOT 198 09/30/2024 07:14 AM     (H) 09/30/2024 07:14 AM    HDL 44 09/30/2024 07:14 AM    TRIGLYCERIDE 175 (H) 09/30/2024 07:14 AM       Lab Results   Component Value Date/Time    TROPONINT 7 07/17/2024 1913     Lab Results   Component Value Date/Time    NTPROBNP 70 07/17/2024 1913     Coronary Angiogram 2/17/2025  HEMODYNAMICS:   Aortic pressure: 112/79 mmHg  Pre-A wave pressure: 9 mmHg  No significant aortic gradient on pullback     CORONARY ANGIOGRAPHY:  The left main coronary artery is patent and bifurcates into the left anterior descending and left circumflex coronaries.  The left anterior descending coronary artery is a large, transapical vessel that supplies a moderate first diagonal branch and a small second diagonal branch and has no angiographically significant disease.  The left circumflex coronary artery is a  large, nondominant vessel that supplies a small first obtuse marginal branch, a large and bifurcating second obtuse marginal branch, and a small third obtuse marginal branch and has no angiographically significant disease.  The right coronary artery is a large, dominant vessel that supplies a large posterior descending coronary and a large posterolateral branch and has no angiographically significant disease.     IMPRESSION:  Angiographically normal coronary arteries.  Normal left heart filling pressures.     RECOMMENDATIONS:  Return to floor with continued care per primary service.  TR band release per protocol.  Continue evaluation for chronic chest pain symptoms not due to obstructive epicardial coronary artery disease.      Cardiac PET 3/12/2025   PET IMAGING INTERPRETATION      Large defect of mildly to moderately reduced uptake in the apex, apical    anterior, apical septal, apical inferior, mid anterior, mid anteroseptal, mid      inferoseptal, mid inferior segments which is more present in stress than rest      images suggestive of large scar and mild ischemia in the apical and mid    inferior segments.   Normal left ventricular wall motion.  LV ejection fraction = 73%.      FLOW RESERVE INTERPRETATION      Reduced coronary flow at rest with normal coronary flow reserve  Assessment:   1. Inappropriate sinus tachycardia (HCC)    2. POTS (postural orthostatic tachycardia syndrome)    3. SVT (supraventricular tachycardia) (HCC)    4. Chronic coronary microvascular dysfunction    5. Other chest pain                Medical Decision Making:  Today's Assessment / Plan:   Chest pain  Chronic coronary microvascular dysfunction  -Patient reports stable chest pain resolved with nitroglycerine but has only taken it a few times as needed when going on walks  -Continue Ranolazine 1000 mg twice a day  -Continue metoprolol 50 mg twice a day  -Continue imdur 60 mg daily  -Nitroglycerine 0.4 mg as needed for chest pain not to  exceed three doses    POTS  Inappropriate sinus tachycardia  -Continue to monitor salt through diet  -Continue corlanor 7.5 mg twice a day  -Continue spironolactone 50 mg daily   -Continue metoprolol 50 mg twice a day  -Blood pressure well controlled, continue to monitor at home    Return in about 3 months (around 8/8/2025) for Edwina SANTOS  Sooner if problems.    PER Flores.

## 2025-05-09 ENCOUNTER — PHARMACY VISIT (OUTPATIENT)
Dept: PHARMACY | Facility: MEDICAL CENTER | Age: 35
End: 2025-05-09
Payer: COMMERCIAL

## 2025-05-09 PROCEDURE — RXMED WILLOW AMBULATORY MEDICATION CHARGE

## 2025-05-09 PROCEDURE — RXMED WILLOW AMBULATORY MEDICATION CHARGE: Performed by: INTERNAL MEDICINE

## 2025-05-12 ENCOUNTER — PHARMACY VISIT (OUTPATIENT)
Dept: PHARMACY | Facility: MEDICAL CENTER | Age: 35
End: 2025-05-12
Payer: COMMERCIAL

## 2025-05-12 ENCOUNTER — TELEPHONE (OUTPATIENT)
Dept: CARDIOLOGY | Facility: MEDICAL CENTER | Age: 35
End: 2025-05-12

## 2025-05-12 ENCOUNTER — OFFICE VISIT (OUTPATIENT)
Dept: MEDICAL GROUP | Facility: MEDICAL CENTER | Age: 35
End: 2025-05-12
Payer: MEDICARE

## 2025-05-12 VITALS
DIASTOLIC BLOOD PRESSURE: 60 MMHG | BODY MASS INDEX: 43.89 KG/M2 | OXYGEN SATURATION: 98 % | SYSTOLIC BLOOD PRESSURE: 90 MMHG | HEART RATE: 64 BPM | HEIGHT: 64 IN | TEMPERATURE: 97.1 F | WEIGHT: 257.06 LBS | RESPIRATION RATE: 16 BRPM

## 2025-05-12 DIAGNOSIS — M79.642 LEFT HAND PAIN: ICD-10-CM

## 2025-05-12 DIAGNOSIS — M54.50 CHRONIC LOW BACK PAIN, UNSPECIFIED BACK PAIN LATERALITY, UNSPECIFIED WHETHER SCIATICA PRESENT: ICD-10-CM

## 2025-05-12 DIAGNOSIS — G89.29 CHRONIC LOW BACK PAIN, UNSPECIFIED BACK PAIN LATERALITY, UNSPECIFIED WHETHER SCIATICA PRESENT: ICD-10-CM

## 2025-05-12 DIAGNOSIS — Q79.60 EHLERS-DANLOS SYNDROME: Chronic | ICD-10-CM

## 2025-05-12 DIAGNOSIS — M35.7 HYPERMOBILITY SYNDROME: ICD-10-CM

## 2025-05-12 PROCEDURE — 3078F DIAST BP <80 MM HG: CPT | Performed by: STUDENT IN AN ORGANIZED HEALTH CARE EDUCATION/TRAINING PROGRAM

## 2025-05-12 PROCEDURE — 3074F SYST BP LT 130 MM HG: CPT | Performed by: STUDENT IN AN ORGANIZED HEALTH CARE EDUCATION/TRAINING PROGRAM

## 2025-05-12 PROCEDURE — 99213 OFFICE O/P EST LOW 20 MIN: CPT | Performed by: STUDENT IN AN ORGANIZED HEALTH CARE EDUCATION/TRAINING PROGRAM

## 2025-05-12 ASSESSMENT — FIBROSIS 4 INDEX: FIB4 SCORE: 0.88

## 2025-05-12 NOTE — PROGRESS NOTES
Subjective:     CC: since last visit seen in ER for hand cramp.     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  # nexplanon  # chronic pain associated with EDS ( gabapentin, ibuprofen, acetaminophen),   # GERD/ gastroparesis related to EDS  # PCOS- spironolactone  # schizo on geodon  # hemorrhoids  # history of idiopathic intracranial hypertension  # hx of transverse myelitis vs functional neurologic disorder  # hx of optic neuritits previously on cellcept ([phone]) - no longer following with neuroophthalmology  # kapil 1:80 (2024), ccp 4 (2024), rheum factor <10 (2024)     Specialist  - cardiology - [facility]   - dermatology- [facility] dermatology  - on Humira for hidraadenitis   - gastroenterology - [facility], nafld, polyp, colonoscopy   - pain management/ neurology - [facility]  - neuromuscular neurology- [facility]  - ophthalmology- [facility] - EDS visual changes     Device  - interstim stimulator  - Hx of l4-l5 fusion      EDS monitoring/ work up  - 2022 Echocardiogram LVEF 55% - aortic root normal for BSA  - 2022 stress test   - follows with ophthalmology  - hx of spine surgery/ fusion   - CT cervical spine- No acute fracture or dislocation seen in the CT scan of the cervical spine.   - EGD- 2017 dysphagia, gastroparesis  - colonoscopy - 5 year recall 2024, due 2029       ===============================    Seen at [facility] for EDS, left hand pain, contracutre of hand, referred to OT, dx hand xray showed no bony abnormality . Note reviewed. She hand finger brace/ to keep finger extended and currently working with  and will have OT once she return to work. She  "has follow up with elena for her pain as well.   Seen at cardiology for pots, inappropriate sinus tachycardia  Seen at ED   HH PT  OT     Lower back pain- continues to be off back brace, prior negative MRI, overall symptoms improved still requires minimal opiate for therapeutic purposes, we agreed we would not extend the course of prescription.       Health Maintenance:     ROS:  ROS  At baseline     Objective:     Exam:  BP 90/60 (BP Location: Left arm, Patient Position: Sitting, BP Cuff Size: Large adult)   Pulse 64   Temp 36.2 °C (97.1 °F) (Temporal)   Resp 16   Ht 1.626 m (5' 4\") Comment: pt reported  Wt 117 kg (257 lb 0.9 oz)   SpO2 98%   BMI 44.12 kg/m²  Body mass index is 44.12 kg/m².    Physical Exam  Constitutional:       Appearance: Normal appearance.   Cardiovascular:      Rate and Rhythm: Normal rate and regular rhythm.      Heart sounds: No murmur heard.  Pulmonary:      Effort: Pulmonary effort is normal.      Breath sounds: Normal breath sounds. No wheezing.   Musculoskeletal:      Cervical back: Normal range of motion and neck supple.   Lymphadenopathy:      Cervical: No cervical adenopathy.   Neurological:      Mental Status: She is alert.         Labs:     Assessment & Plan:     34 y.o. female with the following -     1. Annetta-Danlos syndrome  2. Hypermobility syndrome  3. Left hand pain  Assessment & Plan  1. Left hand pain.  - Patient reports continued pain in the left hand, currently managed with pain medication.  - Physical examination and previous ER visit note reviewed  - Advised to work with occupational therapy to regain function.  - acetaminophen 500mg q8h prn     Gastrointestinal issues.  - Patient reports a flare-up of gut issues and significant weight loss of 3 pounds in a week.  - Physical examination indicates gastrointestinal distress.  - GI consultants have recommended a colonoscopy, which is scheduled for 08/2025.  - Patient advised to manage symptoms and follow up with GI " consultants as planned.          Return in about 3 months (around 8/12/2025) for Lab review, Med check.    Please note that this dictation was created using voice recognition software. I have made every reasonable attempt to correct obvious errors, but I expect that there are errors of grammar and possibly content that I did not discover before finalizing the note.

## 2025-05-12 NOTE — LETTER
PROCEDURE/SURGERY CLEARANCE FORM      Encounter Date: 5/12/2025    Patient: Kristin Balderrama  YOB: 1990    CARDIOLOGIST:  PER Flores.     REFERRING DOCTOR:  No ref. provider found        The following procedure/surgery: Colonoscopy with deep (propofol) sedation                                          PROCEDURE/SURGERY CLEARANCE FORM    Date: 5/13/2025   Patient Name: Kristin Balderrama    Dear Surgeon or Proceduralist,      Thank you for your request for cardiac stratification of our mutual patient Kristin Balderrama 1990. We have reviewed their [facility] records; and to the best of our understanding this patient has not had stenting, ablation, watchman, cardiothoracic surgery or hospitalization for cardiovascular reasons in the past 6 months.  Kristin Balderrama has been seen within the past 15 months and is considered to have non-modifiable cardiac risk for this low-risk procedure/surgery. They may proceed from a cardiovascular standpoint and may hold their antiplatelet/anticoagulation as briefly as possible. Please have patient resume this medication when hemodynamically stable to do so.     Aspirin or Prasugrel   - hold 7 days prior to procedure/surgery, resume when hemodynamically stable      Clopidrogrel or Ticagrelor  - hold 7 days for all neurological procedures, hold 5 days prior to all other procedure/surgery,  resume when hemodynamically stable     Warfarin - hold 7 days for all neurological procedures, hold 5 days prior to all other procedure/surgery and coordinate with [facility] ([phone]) INR testing and dose management.      Pradaxa/Xarelto/Eliquis/Savesya - hold 1 day prior to procedure for low bleeding risk procedure, 2 days for high bleeding risk procedure, or consider holding 3 days or longer for patients with reduced kidney function (CrCl <30mL/min) or spinal/cranial surgeries/procedures.      If they have a mechanical heart  valve, please coordinate with [facility] ([phone]) the proper management of their anticoagulant in the periprocedural or perioperative period.      Some patients have higher risk for cardiovascular complications or holding medication. If our patient has had prior complications of holding antiplatelet or anticoagulants in the past and we have seen them after these events, we have addressed these concerns with the patient. They are at an unknown degree of increased risk for recurrent complication.  You may hold anticoagulation/antiplatelets for the procedure or surgery if the benefits of the procedure or surgery outweigh this nonmodifiable risk.      If Kristin Balderrama 1990 has new symptoms of heart failure decompensation, unstable arrythmia, or angina please reach out and we will assess the patient.      If you have other patient-specific concerns, please feel free to reach out to the patient's cardiologist directly at [phone].     Thank you,       [facility]    Additional comments:   Cleared to have colonoscopy . Patient is at moderate risk for low to moderate risk procedure. Hold aspirin for 7 days prior to procedure.                     Electronically signed      MD Signature   [email or web address].

## 2025-05-13 NOTE — TELEPHONE ENCOUNTER
"Last OV: 05.08.2025  Proposed Surgery: Colonoscopy with deep (propofol) sedation  Surgery Date: TBD  Requesting Office Name: [facility]  Fax Number: [phone]  Preference of Location (default is surgery center unless specified by Cardiologist or [facility])  Prior Clearance Addressed: No    Is this a general clearance? YES   Anticoags/Antiplatelets: Aspirin  Anticoags/Antiplatelet managed by Cardiology? YES    Outstanding Cardiac Imaging : Yes  Other CT-CTA HEART W/3D   Clearance to provider to review  Ablation, Cardioversion, Stent, Cardiac Devices, Catheterization, Watchman: No  TAVR/Valve, Mitral Clip, Watchman (including open heart),: N/A   Recent Cardiac Hospitalization: Yes  Date:  3.31.2025            When: Hospitalized in the last 3 months. Forward to provider to review.   History (cardiac history):   Past Medical History:   Diagnosis Date    Abdominal pain     Anginal syndrome     Random chest pain especially with tachycardia    Apnea, sleep     Arthritis     ASTHMA     when around smoke    Back pain     Borderline personality disorder (HCC)     Chickenpox     Chronic UTI 09/18/2010    Daytime sleepiness     Dental disorder 03/08/2021    Infected tooth    Depression     Diabetes (HCC)     Diarrhea     Incontinece    Disorder of thyroid     Hashimoto's    Fatigue     Frequent headaches     Heart burn     History of falling     Inappropriate sinus tachycardia (HCC) 2016    Statusp post ablation by Dr. Kumar.    Migraine     Mitochondrial disease (HCC)     Multiple personality disorder (HCC)     Obesity     Pain     Back, 7/10    Painful joint     PCOS (polycystic ovarian syndrome)     Pneumonia 2012 12/2020    POTS (postural orthostatic tachycardia syndrome)     Psychosis (HCC)     Ringing in ears     Scoliosis     Shortness of breath     O2 as needed    Sinus tachycardia 10/31/2013    Sleep apnea     CPAP \"pulmonary doctor took me off mid year 2016\"    Snoring     Supraventricular tachycardia " ([facility]) 04/10/2019    Transverse myelitis ([facility])     2/8/22: Per pt: not anymore    Tuberculosis     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    Urinary bladder disorder     Suprapubic cath. 2/8/22: Not anymore.     Urinary incontinence     Weakness     Wears glasses            Is this a dental clearance? NO  Ablation, Cardioversion, Watchman, Stents, Cath, Devices within the last 3 months? No   If yes- Send dental wait letter, do not forward to provider for review.     TAVR / Valve, Mitral clip within the last 6 months? No  If yes- Send dental wait letter, do not forward to provider for review.     If completing a general clearance, continue per protocol.           Surgical Clearance Letter Sent: No Provider to advise.   **Scan clearance request letter into [facility].**

## 2025-05-14 ENCOUNTER — PHARMACY VISIT (OUTPATIENT)
Dept: PHARMACY | Facility: MEDICAL CENTER | Age: 35
End: 2025-05-14
Payer: COMMERCIAL

## 2025-05-14 ENCOUNTER — OFFICE VISIT (OUTPATIENT)
Dept: URGENT CARE | Facility: CLINIC | Age: 35
End: 2025-05-14
Payer: MEDICARE

## 2025-05-14 VITALS
WEIGHT: 256.1 LBS | TEMPERATURE: 97 F | BODY MASS INDEX: 43.72 KG/M2 | RESPIRATION RATE: 14 BRPM | DIASTOLIC BLOOD PRESSURE: 64 MMHG | HEART RATE: 70 BPM | SYSTOLIC BLOOD PRESSURE: 122 MMHG | HEIGHT: 64 IN | OXYGEN SATURATION: 97 %

## 2025-05-14 DIAGNOSIS — H60.311 ACUTE DIFFUSE OTITIS EXTERNA OF RIGHT EAR: Primary | ICD-10-CM

## 2025-05-14 PROCEDURE — 3078F DIAST BP <80 MM HG: CPT | Performed by: PHYSICIAN ASSISTANT

## 2025-05-14 PROCEDURE — 3074F SYST BP LT 130 MM HG: CPT | Performed by: PHYSICIAN ASSISTANT

## 2025-05-14 PROCEDURE — 99213 OFFICE O/P EST LOW 20 MIN: CPT | Performed by: PHYSICIAN ASSISTANT

## 2025-05-14 PROCEDURE — RXMED WILLOW AMBULATORY MEDICATION CHARGE: Performed by: PHYSICIAN ASSISTANT

## 2025-05-14 RX ORDER — OFLOXACIN 3 MG/ML
10 SOLUTION AURICULAR (OTIC) DAILY
Qty: 14 ML | Refills: 0 | Status: SHIPPED | OUTPATIENT
Start: 2025-05-14 | End: 2025-05-24

## 2025-05-14 RX ORDER — POLYMYXIN B SULFATE AND TRIMETHOPRIM 1; 10000 MG/ML; [USP'U]/ML
1 SOLUTION OPHTHALMIC EVERY 4 HOURS
Qty: 10 ML | Refills: 0 | Status: CANCELLED | OUTPATIENT
Start: 2025-05-14 | End: 2025-05-21

## 2025-05-14 ASSESSMENT — ENCOUNTER SYMPTOMS
COUGH: 0
VOMITING: 0
DIARRHEA: 0
FEVER: 0
CHILLS: 0

## 2025-05-14 ASSESSMENT — FIBROSIS 4 INDEX: FIB4 SCORE: 0.88

## 2025-05-14 NOTE — PROGRESS NOTES
"Subjective:   Kristin Balderrama  is a 34 y.o. female who presents for Otalgia (possible ear infection in both ears started symptoms a few days ago )      Otalgia   This is a new problem. The current episode started in the past 7 days. Pertinent negatives include no coughing, diarrhea or vomiting.   Patient presents urgent care noting right ear pain onset over the last few days with acute worsening last night.  Patient describes sharp pain last night.  Has had mild tinnitus over the last few days.  Denies fevers or chills.  Has noted some damp possible drainage from left ear over the last 24 hours.  Patient has had mild congestion denies significant cough.  Denies vomiting or diarrhea.  Has tried no treatments thus far.    Review of Systems   Constitutional:  Negative for chills and fever.   HENT:  Positive for congestion, ear pain and tinnitus.    Respiratory:  Negative for cough.    Gastrointestinal:  Negative for diarrhea and vomiting.       Allergies[1]     Objective:   /64 (BP Location: Left arm, Patient Position: Sitting)   Pulse 70   Temp 36.1 °C (97 °F) (Temporal)   Resp 14   Ht 1.626 m (5' 4\")   Wt 116 kg (256 lb 1.6 oz)   SpO2 97%   BMI 43.96 kg/m²     Physical Exam  Vitals and nursing note reviewed.   Constitutional:       General: She is not in acute distress.     Appearance: She is well-developed. She is not diaphoretic.   HENT:      Head: Normocephalic and atraumatic.      Right Ear: External ear normal. Swelling and tenderness present. No drainage. No middle ear effusion. Tympanic membrane is not erythematous.      Left Ear: Tympanic membrane, ear canal and external ear normal. Tympanic membrane is not erythematous.      Ears:      Comments: Erythematous EAC slightly boggy     Nose: Nose normal.      Mouth/Throat:      Mouth: Mucous membranes are moist.      Pharynx: Uvula midline. Posterior oropharyngeal erythema ( mild PND) present. No oropharyngeal exudate.      Tonsils: No tonsillar " abscesses.   Eyes:      General: Lids are normal. No scleral icterus.        Right eye: No discharge.         Left eye: No discharge.      Conjunctiva/sclera: Conjunctivae normal.   Pulmonary:      Effort: Pulmonary effort is normal. No respiratory distress.      Breath sounds: Normal breath sounds. No stridor. No decreased breath sounds, wheezing, rhonchi or rales.   Musculoskeletal:         General: Normal range of motion.      Cervical back: Neck supple.   Skin:     General: Skin is warm and dry.      Coloration: Skin is not pale.      Findings: No erythema.   Neurological:      Mental Status: She is alert and oriented to person, place, and time. She is not disoriented.   Psychiatric:         Speech: Speech normal.         Behavior: Behavior normal.         Assessment/Plan:   1. Acute diffuse otitis externa of right ear  - ofloxacin otic sol (FLOXIN OTIC) 0.3 % Solution; Administer 10 Drops into affected ear(s) every day for 7 days.  Dispense: 14 mL; Refill: 0  Supportive care is reviewed with patient/caregiver - recommend to push PO fluids and electrolytes, discussed challenging to treat with history of allergic reactions to so many medications, patient willing to trial otic ofloxacin  Return to clinic with lack of resolution or progression of symptoms.  Declines ENT referral    I have worn an N95 mask, gloves and eye protection for the entire encounter with this patient.     Differential diagnosis, natural history, supportive care, and indications for immediate follow-up discussed.            [1]   Allergies  Allergen Reactions    Cefdinir Shortness of Breath and Itching     Tolerated 1/18/17  Tolerates ceftriaxone  Tolerated augmentin 8/2019     Depakote [Divalproex Sodium] Unspecified     Muscle spasms/muscle pain and weakness      Doxycycline Anaphylaxis and Vomiting     Other reaction(s): pustules/blisters  Other reaction(s): stomach pain    Montelukast [Singulair] Unspecified     Cardiac effusion     "Vancomycin Itching     Pt becomes flushed in face and chest.   RXN=7/10/16    Wound Dressing Adhesive Rash     By pt report-\"removes skin totally off\"    Amitriptyline Unspecified     Headaches      Amoxicillin Rash      Tolerates augmentin    Aripiprazole [Abilify] Unspecified     Headaches/muscle twitching      Clindamycin Nausea         Other reaction(s): nausea stomach pain    Erythromycin Rash     .  Other reaction(s): nausea stomach pain    Flomax [Tamsulosin Hydrochloride] Swelling    Hydromorphone      Other reaction(s): vomiting    Levaquin Unspecified     Severe muscle cramps in legs  RXN=unknown  Other reaction(s): leg muscle cramps    Metformin Unspecified     Increased lactic acid     Other reaction(s): itching and rash/nausea vomiting    Sulfa Drugs Hives, Itching, Myalgia and Unspecified     Muscle pain and weakness    Other reaction(s): unknown    Tamsulosin Swelling     Swelling of legs    Tape Rash     Tears skin off  coban with Tegaderm tape ok intermittently  RXN=ongoing    Sulfamethoxazole W-Trimethoprim Rash    Ciprofloxacin Rash          Keflex Rash     Pt states she gets a rash with this medication  Tolerates ceftriaxone  Can take with Benadryl    Levofloxacin Unspecified     Leg muscle cramps    Metronidazole Rash     \"Vision problems\"  Other reaction(s): vision problems     "

## 2025-05-15 NOTE — ED PROVIDER NOTES
"ED Provider Note    CHIEF COMPLAINT  Chief Complaint   Patient presents with   • Asthma       HPI  Kristin Balderrama is a 28 y.o. female who presents for evaluation of shortness of breath.  Patient has a known history of.  Patient's been seen multiple times in the past for exacerbation of asthma.  Patient states around 10 AM this morning she developed increasing shortness of breath along with wheezing.  The patient has optimized her treatment as an outpatient and has had persistent wheezing.  The patient states she has been coughing up some brown phlegm.  Patient's been having increasing wheezing and shortness of breath.  She feels like she may have had a fever but is unsure.  She denies: Chest pain, abdominal pain, vomiting, diarrhea, rashes, pain or swelling lower extremities.  No other acute symptomatology or complaints.    REVIEW OF SYSTEMS  See HPI for further details. All other systems negative.    PAST MEDICAL HISTORY  Past Medical History:   Diagnosis Date   • Abdominal pain    • Anginal syndrome     random chest pain especially with tachycardia   • Apnea, sleep    • Arrhythmia     \"sinus tachycardia\", cariologist, Dr. Kumar; ablation 2/2016   • Arthritis     osteo   • ASTHMA     when around smoke   • Atrial fibrillation (HCC)    • Back pain    • Borderline personality disorder (HCC)    • Breath shortness     with tachycardia   • Bronchitis    • Cardiac arrhythmia    • Chickenpox    • Chronic UTI 9/18/2010   • Cough    • Daytime sleepiness    • Depression    • Diabetes (HCC)    • Diarrhea    • Disorder of thyroid    • Fall    • Fatigue    • Frequent headaches    • Gasping for breath    • Gynecological disorder     PCOS   • Headache(784.0)    • Heart burn    • History of falling    • Hypertension    • Indigestion    • Migraine    • Mitochondrial disease (HCC)    • Multiple personality disorder (HCC)    • Nausea    • Obesity    • Pain 08-15-12    back, 7/10   • Painful joint    • PCOS (polycystic ovarian " Name: Shanika Candelario      : 1967      MRN: [id number]  Encounter Provider: January Perera DO  Encounter Date: 5/15/2025   Encounter department: [facility]  :  Assessment & Plan  Obesity, Class I, BMI 30-34.9      Orders:    CBC and Platelet; Future  Pt started zepbound last week and has followup with wt mgmt  She has increased protein and water intake   Hyperlipidemia, unspecified hyperlipidemia type    Orders:    Lipid panel; Future    Comprehensive metabolic panel; Future  Continue statin Exercise Lo fat diet   Arthritis    Orders:    CBC and Platelet; Future  Labs next visit Exercise as tolerated     Rto 4 months      History of Present Illness   HPI  Pt generally well She did start zepbound last week and tolerating thus far She is planning to start exercising again No acute issues No chest pain or sob No limiting knee pain but hoping to lose weight to help joint pain She is still working at the [employer]   Review of Systems   Constitutional:  Negative for chills and fever.   HENT: Negative.     Eyes:  Negative for visual disturbance.   Respiratory:  Negative for cough and shortness of breath.    Cardiovascular: Negative.    Gastrointestinal: Negative.    Genitourinary: Negative.    Musculoskeletal:  Positive for arthralgias.   Neurological:  Negative for dizziness, light-headedness and headaches.   Psychiatric/Behavioral:  Negative for sleep disturbance. The patient is not nervous/anxious.      Past Medical History:   Diagnosis Date    Arthritis     COVID-19 10/12/2021    Herpes gingivostomatitis     Hypercholesterolemia     Hypertension     PONV (postoperative nausea and vomiting)     Seasonal allergies     Varicella      Past Surgical History:   Procedure Laterality Date    COLONOSCOPY      CA EXCISION EXCESSIVE SKIN & SUBQ TISSUE ABDOMEN N/A 10/21/2022    Procedure: GAB PERKINS;  Surgeon: You Sullivan MD;  Location:  MAIN OR;  Service: Plastics    REDUCTION MAMMAPLASTY      2003:  "    WRIST SURGERY      2009: left necrosis of unknown etiology-with hardware     Social History     Socioeconomic History    Marital status:      Spouse name: Not on file    Number of children: 2    Years of education: some college    Highest education level: Not on file   Occupational History    Occupation:    Tobacco Use    Smoking status: Never    Smokeless tobacco: Never   Vaping Use    Vaping status: Never Used   Substance and Sexual Activity    Alcohol use: Yes     Alcohol/week: 2.0 standard drinks of alcohol     Types: 2 Glasses of wine per week    Drug use: No    Sexual activity: Yes     Partners: Male     Birth control/protection: Condom Male, Post-menopausal   Other Topics Concern    Not on file   Social History Narrative    Caffeine use    Quaker    Uses seatbelts     Social Drivers of Health     Financial Resource Strain: Not on file   Food Insecurity: Not on file   Transportation Needs: Not on file   Physical Activity: Not on file   Stress: Not on file   Social Connections: Not on file   Intimate Partner Violence: Not on file   Housing Stability: Not on file     Allergies   Allergen Reactions    Pollen Extract Allergic Rhinitis       Objective   /68   Pulse 68   Temp 97.7 °F (36.5 °C) (Temporal)   Resp 18   Ht 5' 5.5\" (1.664 m)   Wt 93.9 kg (207 lb)   LMP 02/25/2018   SpO2 96%   BMI 33.92 kg/m²      Physical Exam  Vitals and nursing note reviewed.   Constitutional:       General: She is not in acute distress.     Appearance: Normal appearance. She is not ill-appearing, toxic-appearing or diaphoretic.   HENT:      Head: Normocephalic and atraumatic.      Right Ear: External ear normal.      Left Ear: External ear normal.      Nose: Nose normal.      Mouth/Throat:      Mouth: Mucous membranes are moist.     Eyes:      General: No scleral icterus.     Extraocular Movements: Extraocular movements intact.      Conjunctiva/sclera: Conjunctivae normal.      " "syndrome)    • Pneumonia 2012   • Psychosis (HCC)    • Renal disorder     \"kidney disease, stage 1\" nephrologist, Dr. 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   • Urinary bladder disorder     Suprapubic cath   • Urinary incontinence    • Weakness    • Wears glasses        FAMILY HISTORY  Family History   Problem Relation Age of Onset   • Hypertension Mother    • Sleep Apnea Mother    • Heart Disease Mother    • Other Mother         hypothryod   • Hypertension Maternal Uncle    • Heart Disease Maternal Grandmother    • Hypertension Maternal Grandmother    • No Known Problems Sister    • Other Sister         Narcolepsy;fibromyalsia;bone;nerve   • Genitourinary () Problems Sister         endometriosis       SOCIAL HISTORY  Non-smoker;    SURGICAL HISTORY  Past Surgical History:   Procedure Laterality Date   • MUSCLE BIOPSY Right 1/26/2017    Procedure: MUSCLE BIOPSY - THIGH;  Surgeon: Isidro Vigil M.D.;  Location: [facility];  Service:    • GASTROSCOPY WITH BALLOON DILATATION N/A 1/4/2017    Procedure: GASTROSCOPY WITH DILATATION;  Surgeon: Torres Vargas M.D.;  Location: [facility];  Service:    • BOWEL STIMULATOR INSERTION  7/13/2016    Procedure: BOWEL STIMULATOR INSERTION FOR PERMANENT INTERSTIM SACRAL IMPLANT;  Surgeon: Joe Noyola M.D.;  Location: [facility];  Service:    • RECOVERY  1/27/2016    Procedure: CATH LAB EP STUDY WITH SINUS NODE MODIFICATION ABHINAV;  Surgeon: [facility];  [place] [facility] [place]:    • OTHER CARDIAC SURGERY  1/2016    cardiac ablation   • NEURO DEST FACET L/S W/IG SNGL  4/21/2015    Performed by Reza Tabor at [facility] SURGICAL [facility] ORS   • LUMBAR FUSION ANTERIOR  8/21/2012    Performed " "by SUSIE SAWANT at SURGERY [facility] ORS   • ALYSSA BY LAPAROSCOPY  8/29/2010    Performed by SHAYY JOHNS at SURGERY [facility] ORS   • LAMINOTOMY     • OTHER ABDOMINAL SURGERY     • TONSILLECTOMY      tonsillectomy       CURRENT MEDICATIONS  Home Medications    **Home medications have not yet been reviewed for this encounter**         ALLERGIES  Allergies   Allergen Reactions   • Cefdinir Shortness of Breath and Itching     Tolerated 1/18/17  Tolerates ceftriaxone    • Depakote [Divalproex Sodium] Unspecified     Muscle spasms/muscle pain and weakness     • Doxycycline Anaphylaxis and Vomiting     RXN=unknown   • Amitriptyline Unspecified     Headaches     • Aripiprazole [Abilify] Unspecified     Headaches/muscle twitching     • Ciprofloxacin Rash     Pt states \"I get a rash\".     • Clindamycin Nausea     Even with food     • Ees [Erythromycin] Vomiting and Nausea   • Flagyl [Metronidazole Hcl] Unspecified     \"eye problems\"     • Flomax [Tamsulosin Hydrochloride] Swelling   • Metformin Unspecified     Increased lactic acid      • Tape Rash     Tears skin off  coban with Tegaderm tape ok intermittently  RXN=ongoing   • Vancomycin Itching     Pt becomes flushed in face and chest.   RXN=7/10/16   • Wound Dressing Adhesive Hives     By pt report   • Cephalexin [Keflex] Rash     Pt states she gets a rash with this medication  Tolerates ceftriaxone   • Divalproex Sodium [Valproic Acid] Rash     .   • Levofloxacin Unspecified     Leg muscle cramps   • Metronidazole Rash     .   • Tamsulosin Hcl        PHYSICAL EXAM  VITAL SIGNS: /68   Pulse (!) 110   Temp 37 °C (98.6 °F) (Temporal)   Resp (!) 22   Ht 1.651 m (5' 5\")   Wt (!) 126.6 kg (279 lb)   LMP 08/01/2019   SpO2 98%   BMI 46.43 kg/m²    Constitutional: 28-year-old female, awake, oriented x3, mildly tachypneic  HENT: ,Atraumatic, Bilateral external ears normal, tympanic membranes clear, Oropharynx mildly dry, No oral exudates, Nose normal. " Pupils: Pupils are equal, round, and reactive to light.       Cardiovascular:      Rate and Rhythm: Normal rate and regular rhythm.      Pulses: Normal pulses.      Heart sounds: Normal heart sounds. No murmur heard.  Pulmonary:      Effort: Pulmonary effort is normal. No respiratory distress.      Breath sounds: Normal breath sounds.   Abdominal:      General: There is no distension.      Palpations: Abdomen is soft.      Tenderness: There is no abdominal tenderness.     Musculoskeletal:      Cervical back: Normal range of motion and neck supple.      Right lower leg: No edema.      Left lower leg: No edema.   Lymphadenopathy:      Cervical: No cervical adenopathy.     Skin:     General: Skin is warm and dry.      Coloration: Skin is not jaundiced.     Neurological:      General: No focal deficit present.      Mental Status: She is alert and oriented to person, place, and time. Mental status is at baseline.      Cranial Nerves: No cranial nerve deficit.     Psychiatric:         Mood and Affect: Mood normal.         Behavior: Behavior normal.         Thought Content: Thought content normal.         Judgment: Judgment normal.            Eyes: PERRL, EOMI, Conjunctiva normal, No discharge.   Neck: Normal range of motion, No tenderness, Supple, No stridor.   Lymphatic: No lymphadenopathy noted.   Cardiovascular: Normal heart rate, Normal rhythm, No murmurs, No rubs, No gallops.   Thorax & Lungs: Decreased breath sounds bilaterally with bilateral expiratory wheezing, no stridor, no rales. No chest tenderness.   Abdomen: Soft, nontender, nondistended, no organomegaly, positive bowel sounds normal in quality. No guarding or rebound.  Skin: Good skin turgor, pink, warm, dry. No rashes, petechiae, purpura. Normal capillary refill.   Back: No tenderness, No CVA tenderness.   Extremities: Intact distal pulses, No edema, No tenderness, No cyanosis, No clubbing. Vascular: Pulses are 2+, symmetric in the upper and lower extremities.  Musculoskeletal: Good range of motion in all major joints. No tenderness to palpation or major deformities noted.   Neurologic: Alert & oriented x 3, Normal motor function, Normal sensory function, No gross focal deficits noted.   Psychiatric: Affect normal, Judgment normal, Mood normal.     RADIOLOGY/PROCEDURES  DX-CHEST-PORTABLE (1 VIEW)   Final Result      No acute cardiopulmonary abnormality.            COURSE & MEDICAL DECISION MAKING  Pertinent Labs & Imaging studies reviewed. (See chart for details)  1.  Saline lock  2.  Solu-Medrol  3.  Albuterol  4.  Ceftriaxone 2 g IV    Laboratory studies: CBC shows white count of 9.8, 84% neutrophils, 11% lymphocytes, 2% monocytes, 1.4% immature granulocytes, hemoglobin 14.2, hematocrit 44.0; CMP shows a CO2 of 15, glucose 232, otherwise within normal; lactic acid 2.9; troponin less than 6; BNP 18; coags within normal; urinalysis shows moderate leukocyte esterase, WBCs 100-1 50, RBCs 2-5, epithelial cells negative, bacteria few;    Discussion/consultation: At this time, the patient presents with cough shortness of breath and wheezing.  Chest x-ray shows no evidence pneumonia.   Patient's white count is normal but the patient does have an increase in her immature granulocytes as well as an mildly elevated lactic acid.  Patient also has findings suggesting possible associated urinary tract infection.  Treatment is initiated as noted above.  Spoke with the hospitalist on-call.  Patient will be admitted for further monitoring, treatment, and care.    FINAL IMPRESSION  1. Moderate persistent asthma with acute exacerbation    2. Lactic acidosis    3. Urinary tract infection without hematuria, site unspecified           PLAN  1.  The patient will be admitted for further monitoring, treatment, and care.    Electronically signed by: Guy G Gansert, 8/29/2019 2:03 PM

## 2025-05-18 ENCOUNTER — OFFICE VISIT (OUTPATIENT)
Dept: URGENT CARE | Facility: CLINIC | Age: 35
End: 2025-05-18
Payer: MEDICARE

## 2025-05-18 VITALS
TEMPERATURE: 97.2 F | OXYGEN SATURATION: 95 % | DIASTOLIC BLOOD PRESSURE: 66 MMHG | RESPIRATION RATE: 19 BRPM | BODY MASS INDEX: 44.01 KG/M2 | HEART RATE: 69 BPM | WEIGHT: 257.8 LBS | SYSTOLIC BLOOD PRESSURE: 128 MMHG | HEIGHT: 64 IN

## 2025-05-18 DIAGNOSIS — R21 RASH AND NONSPECIFIC SKIN ERUPTION: Primary | ICD-10-CM

## 2025-05-18 PROCEDURE — RXMED WILLOW AMBULATORY MEDICATION CHARGE: Performed by: NURSE PRACTITIONER

## 2025-05-18 PROCEDURE — 3078F DIAST BP <80 MM HG: CPT | Performed by: NURSE PRACTITIONER

## 2025-05-18 PROCEDURE — 99213 OFFICE O/P EST LOW 20 MIN: CPT | Performed by: NURSE PRACTITIONER

## 2025-05-18 PROCEDURE — 3074F SYST BP LT 130 MM HG: CPT | Performed by: NURSE PRACTITIONER

## 2025-05-18 RX ORDER — MUPIROCIN 20 MG/G
1 OINTMENT TOPICAL 2 TIMES DAILY
Qty: 22 G | Refills: 0 | Status: SHIPPED | OUTPATIENT
Start: 2025-05-18 | End: 2025-05-31

## 2025-05-18 RX ORDER — HYDROCORTISONE CREAM 1% 10 MG/G
1 CREAM TOPICAL 2 TIMES DAILY
Qty: 28.4 G | Refills: 0 | Status: SHIPPED | OUTPATIENT
Start: 2025-05-18 | End: 2025-05-25

## 2025-05-18 ASSESSMENT — FIBROSIS 4 INDEX: FIB4 SCORE: 0.88

## 2025-05-18 ASSESSMENT — ENCOUNTER SYMPTOMS: FEVER: 0

## 2025-05-18 NOTE — PROGRESS NOTES
Patient/parent/guardian has consented to treatment and for use of patient information for treatment and billing purposes.  Subjective:   Kristin Balderrama  is a 34 y.o. female who presents for Rash (Pt has a rash on armpits, burning x 2 weeks)       Rash  This is a new problem. The current episode started 1 to 4 weeks ago. The problem is unchanged. The affected locations include the left axilla and right axilla. The rash is characterized by burning and itchiness. She was exposed to nothing. Pertinent negatives include no fever or joint pain. Past treatments include nothing. Her past medical history is significant for allergies. There is no history of asthma, eczema or varicella.       Review of Systems   Constitutional:  Negative for fever.   Musculoskeletal:  Negative for joint pain.   Skin:  Positive for rash.         CURRENT MEDICATIONS:  acetaminophen Tabs  albuterol Aers  amLODIPine Tabs  aspirin  Combivent Respimat Aers  dicyclomine Caps  diphenhydrAMINE Tabs  docusate sodium  Emgality Soaj  FIBER PO  gabapentin Caps  isosorbide mononitrate SR Tb24  ivabradine Tabs  lamoTRIgine Tabs  Melatonin Tabs  methocarbamol Tabs  methylPREDNISolone Tbpk  metoprolol SR Tb24  NEXPLANON SC  nitroglycerin Soln  nitroglycerin Subl  Nurtec Tbdp  ofloxacin otic sol Soln  omeprazole  ondansetron Tbdp  polyethylene glycol-electrolytes Solr  Ranolazine Tb12  scopolamine Pt72  senna-docusate Tabs  spironolactone Tabs  sumatriptan  tizanidine Tabs  topiramate  ziprasidone Caps  Allergies:   Allergies[1]  Current Problems: Kristin Balderrama does not have any pertinent problems on file.  Past Surgical Hx:    Past Surgical History:   Procedure Laterality Date    ME CYSTOSCOPY,INSERT URETERAL STENT Right 2/12/2024    Procedure: CYSTOSCOPY, WITH RIGHT URETEROSCOPY, WITH LITHOTRIPSY, WITH INSERTION OF RIGHT URETERAL STENT;  Surgeon: Josafat Roberson M.D.;  Location: SURGERY [facility];  Service: Urology    ME  CYSTO/URETERO/PYELOSCOPY, DX Right 2/12/2024    Procedure: URETEROSCOPY;  Surgeon: Josafat Roberson M.D.;  Location: [facility];  Service: Urology    LASERTRIPSY Right 2/12/2024    Procedure: LITHOTRIPSY, USING LASER;  Surgeon: Josafat Roberson M.D.;  Location: [facility];  Service: Urology    LAPAROSCOPIC INGUINAL HERNIA REPAIR Right 07/21/2023    Procedure: LAPAROSCOPIC RIGHT INGUINAL HERNIA REPAIR WITH MESH;  Surgeon: Joe Noyola M.D.;  Location: [facility];  Service: General    HERNIA REPAIR Right 07/21/2023    PB PERCUT FIX PBOX/NECK FEMUR FX Left 01/28/2023    Procedure: FIXATION, HIP, USING CANNULATED SCREW;  Surgeon: Noman Abdul M.D.;  Location: [facility];  Service: Orthopedics    TX LAP,DIAGNOSTIC ABDOMEN  02/14/2022    Procedure: LAPAROSCOPY;  Surgeon: Seamus Pisano M.D.;  Location: SURGERY SAME DAY [facility];  Service: Gynecology    OVARIAN CYSTECTOMY Right 02/14/2022    Procedure: EXCISION, CYST, OVARY;  Surgeon: Seamus Pisano M.D.;  Location: SURGERY SAME DAY [facility];  Service: Gynecology    BOWEL STIMULATOR INSERTION  03/10/2021    Procedure: INSERTION, ELECTRODE LEADS AND PULSE GENERATOR, NEUROSTIMULATOR, SACRAL - REMOVAL OF INTERSTIM WITH REPLACEMENT OF SACRAL NEUROMODULATION DEVICE;  Surgeon: Joe Noyola M.D.;  Location: [facility];  Service: General    MUSCLE BIOPSY Right 01/26/2017    Procedure: MUSCLE BIOPSY - THIGH;  Surgeon: Isidro Vigil M.D.;  Location: [facility];  Service:     GASTROSCOPY WITH BALLOON DILATATION N/A 01/04/2017    Procedure: GASTROSCOPY WITH DILATATION;  Surgeon: Torres Vargas M.D.;  Location: [facility];  Service:     BOWEL STIMULATOR INSERTION  07/13/2016    Procedure: BOWEL STIMULATOR INSERTION FOR PERMANENT INTERSTIM SACRAL IMPLANT;  Surgeon: Joe Noyola M.D.;  Location: [facility];  Service:     RECOVERY  01/27/2016    Procedure: CATH LAB EP STUDY WITH SINUS NODE  "ELLA LA;  Surgeon: Recoveryonly Surgery;  [place] [facility]  Service:     OTHER CARDIAC SURGERY  01/2016    cardiac ablation    NEURO DEST FACET L/S W/IG SNGL  04/21/2015    Performed by Reza Tabor at SURGERY [facility]    LUMBAR FUSION ANTERIOR  08/21/2012    Performed by SUSIE SAWANT at SURGERY [facility]    ALYSSA BY LAPAROSCOPY  08/29/2010    Performed by SHAYY JOHNS at SURGERY [facility]    LAMINOTOMY      OTHER ABDOMINAL SURGERY      TONSILLECTOMY      tonsillectomy      Past Social Hx:  reports that she has never smoked. She has never been exposed to tobacco smoke. She has never used smokeless tobacco. She reports that she does not drink alcohol and does not use drugs.    Objective:   /66 (BP Location: Left arm, Patient Position: Sitting, BP Cuff Size: Large adult)   Pulse 69   Temp 36.2 °C (97.2 °F) (Temporal)   Resp 19   Ht 1.626 m (5' 4\")   Wt 117 kg (257 lb 12.8 oz)   SpO2 95%   BMI 44.25 kg/m²   Physical Exam  Vitals and nursing note reviewed.   Constitutional:       General: She is not in acute distress.     Appearance: Normal appearance. She is not ill-appearing.   HENT:      Head: Normocephalic and atraumatic.      Right Ear: External ear normal.      Left Ear: External ear normal.      Nose: Nose normal.   Eyes:      Extraocular Movements: Extraocular movements intact.      Conjunctiva/sclera: Conjunctivae normal.      Pupils: Pupils are equal, round, and reactive to light.   Cardiovascular:      Rate and Rhythm: Normal rate.   Pulmonary:      Effort: Pulmonary effort is normal.   Musculoskeletal:         General: Normal range of motion.      Cervical back: Normal range of motion.   Skin:     General: Skin is warm and dry.      Findings: Erythema and rash present. No abrasion, abscess, bruising, burn, ecchymosis, lesion or wound. Rash is urticarial.   Neurological:      General: No focal deficit present.      Mental Status: " She is alert.         Assessment/Plan:   1. Rash and nonspecific skin eruption  - mupirocin (BACTROBAN) 2 % Ointment; Apply 1 Application topically 2 times a day for 10 days.  Dispense: 22 g; Refill: 0  - Hydrocortisone Acetate 1 % Cream; Apply 1 Application topically 2 times a day for 7 days.  Dispense: 15 g; Refill: 0    34-year-old female presents with concern for rash under both axillary regions.  Vital signs stable, afebrile.  No acute distress does not appear ill.  Exam demonstrates faint erythema without drainage, or lesion present.  With her history of hidradenitis suppurativa and significant allergies, we used the shared decision making model for treatment plan.  Consider urticaria, tinea, and atopic dermatitis. Topical hydrocortisone and mupirocin recommended for use twice daily over the next week.  Follow-up with primary care.   Red flags, RTC and ER precautions discussed, with patient confirming their understanding of  need for immediate follow-up.  Discussed medication management options, risks and benefits, and alternatives to treatment plan agreed upon. Instructed to continue  medications without changes as ordered by primary care unless aforementioned above.  Patient expresses understanding and agrees to plan of care. All questions or concerns answered. For my MDM, I have personally reviewed previous notes, and test results as pertinent to today's visit.    Please note that this dictation was created using voice recognition software. I have made every reasonable attempt to correct obvious errors,  but there may be grammar errors, and possibly content that I did not discover before finalizing the note.   This note was electronically signed by VANIA Saucedo              [1]   Allergies  Allergen Reactions    Cefdinir Shortness of Breath and Itching     Tolerated 1/18/17  Tolerates ceftriaxone  Tolerated augmentin 8/2019     Depakote [Divalproex Sodium] Unspecified     Muscle spasms/muscle pain  "and weakness      Doxycycline Anaphylaxis and Vomiting     Other reaction(s): pustules/blisters  Other reaction(s): stomach pain    Montelukast [Singulair] Unspecified     Cardiac effusion    Vancomycin Itching     Pt becomes flushed in face and chest.   RXN=7/10/16    Wound Dressing Adhesive Rash     By pt report-\"removes skin totally off\"    Amitriptyline Unspecified     Headaches      Amoxicillin Rash      Tolerates augmentin    Aripiprazole [Abilify] Unspecified     Headaches/muscle twitching      Clindamycin Nausea         Other reaction(s): nausea stomach pain    Erythromycin Rash     .  Other reaction(s): nausea stomach pain    Flomax [Tamsulosin Hydrochloride] Swelling    Hydromorphone      Other reaction(s): vomiting    Levaquin Unspecified     Severe muscle cramps in legs  RXN=unknown  Other reaction(s): leg muscle cramps    Metformin Unspecified     Increased lactic acid     Other reaction(s): itching and rash/nausea vomiting    Sulfa Drugs Hives, Itching, Myalgia and Unspecified     Muscle pain and weakness    Other reaction(s): unknown    Tamsulosin Swelling     Swelling of legs    Tape Rash     Tears skin off  coban with Tegaderm tape ok intermittently  RXN=ongoing    Sulfamethoxazole W-Trimethoprim Rash    Ciprofloxacin Rash          Keflex Rash     Pt states she gets a rash with this medication  Tolerates ceftriaxone  Can take with Benadryl    Levofloxacin Unspecified     Leg muscle cramps    Metronidazole Rash     \"Vision problems\"  Other reaction(s): vision problems     "

## 2025-05-20 ENCOUNTER — PHARMACY VISIT (OUTPATIENT)
Dept: PHARMACY | Facility: MEDICAL CENTER | Age: 35
End: 2025-05-20
Payer: COMMERCIAL

## 2025-05-20 ENCOUNTER — HOSPITAL ENCOUNTER (EMERGENCY)
Facility: MEDICAL CENTER | Age: 35
End: 2025-05-20
Attending: EMERGENCY MEDICINE
Payer: MEDICARE

## 2025-05-20 VITALS
HEART RATE: 60 BPM | DIASTOLIC BLOOD PRESSURE: 79 MMHG | WEIGHT: 257 LBS | SYSTOLIC BLOOD PRESSURE: 123 MMHG | OXYGEN SATURATION: 98 % | BODY MASS INDEX: 43.87 KG/M2 | TEMPERATURE: 98.2 F | RESPIRATION RATE: 17 BRPM | HEIGHT: 64 IN

## 2025-05-20 DIAGNOSIS — R55 NEAR SYNCOPE: Primary | ICD-10-CM

## 2025-05-20 DIAGNOSIS — N39.0 ACUTE UTI: ICD-10-CM

## 2025-05-20 LAB
ALBUMIN SERPL BCP-MCNC: 4.3 G/DL (ref 3.2–4.9)
ALBUMIN/GLOB SERPL: 1.9 G/DL
ALP SERPL-CCNC: 69 U/L (ref 30–99)
ALT SERPL-CCNC: 33 U/L (ref 2–50)
ANION GAP SERPL CALC-SCNC: 13 MMOL/L (ref 7–16)
APPEARANCE UR: CLEAR
AST SERPL-CCNC: 18 U/L (ref 12–45)
BACTERIA #/AREA URNS HPF: ABNORMAL /HPF
BASOPHILS # BLD AUTO: 0.4 % (ref 0–1.8)
BASOPHILS # BLD: 0.03 K/UL (ref 0–0.12)
BILIRUB SERPL-MCNC: 0.4 MG/DL (ref 0.1–1.5)
BILIRUB UR QL STRIP.AUTO: NEGATIVE
BUN SERPL-MCNC: 17 MG/DL (ref 8–22)
CALCIUM ALBUM COR SERPL-MCNC: 8.9 MG/DL (ref 8.5–10.5)
CALCIUM SERPL-MCNC: 9.1 MG/DL (ref 8.5–10.5)
CASTS URNS QL MICRO: ABNORMAL /LPF (ref 0–2)
CHLORIDE SERPL-SCNC: 111 MMOL/L (ref 96–112)
CO2 SERPL-SCNC: 16 MMOL/L (ref 20–33)
COLOR UR: ABNORMAL
CREAT SERPL-MCNC: 0.91 MG/DL (ref 0.5–1.4)
EKG IMPRESSION: NORMAL
EKG IMPRESSION: NORMAL
EOSINOPHIL # BLD AUTO: 0.1 K/UL (ref 0–0.51)
EOSINOPHIL NFR BLD: 1.4 % (ref 0–6.9)
EPITHELIAL CELLS 1715: ABNORMAL /HPF (ref 0–5)
ERYTHROCYTE [DISTWIDTH] IN BLOOD BY AUTOMATED COUNT: 44.1 FL (ref 35.9–50)
GFR SERPLBLD CREATININE-BSD FMLA CKD-EPI: 84 ML/MIN/1.73 M 2
GLOBULIN SER CALC-MCNC: 2.3 G/DL (ref 1.9–3.5)
GLUCOSE SERPL-MCNC: 142 MG/DL (ref 65–99)
GLUCOSE UR STRIP.AUTO-MCNC: NEGATIVE MG/DL
HCG UR QL: NEGATIVE
HCT VFR BLD AUTO: 40.8 % (ref 37–47)
HGB BLD-MCNC: 13.9 G/DL (ref 12–16)
IMM GRANULOCYTES # BLD AUTO: 0.03 K/UL (ref 0–0.11)
IMM GRANULOCYTES NFR BLD AUTO: 0.4 % (ref 0–0.9)
KETONES UR STRIP.AUTO-MCNC: ABNORMAL MG/DL
LEUKOCYTE ESTERASE UR QL STRIP.AUTO: ABNORMAL
LYMPHOCYTES # BLD AUTO: 1.36 K/UL (ref 1–4.8)
LYMPHOCYTES NFR BLD: 18.6 % (ref 22–41)
MCH RBC QN AUTO: 32 PG (ref 27–33)
MCHC RBC AUTO-ENTMCNC: 34.1 G/DL (ref 32.2–35.5)
MCV RBC AUTO: 94 FL (ref 81.4–97.8)
MICRO URNS: ABNORMAL
MONOCYTES # BLD AUTO: 0.51 K/UL (ref 0–0.85)
MONOCYTES NFR BLD AUTO: 7 % (ref 0–13.4)
MUCOUS THREADS URNS QL MICRO: PRESENT /HPF
NEUTROPHILS # BLD AUTO: 5.27 K/UL (ref 1.82–7.42)
NEUTROPHILS NFR BLD: 72.2 % (ref 44–72)
NITRITE UR QL STRIP.AUTO: NEGATIVE
NRBC # BLD AUTO: 0 K/UL
NRBC BLD-RTO: 0 /100 WBC (ref 0–0.2)
PH UR STRIP.AUTO: 6.5 [PH] (ref 5–8)
PLATELET # BLD AUTO: 192 K/UL (ref 164–446)
PMV BLD AUTO: 11.5 FL (ref 9–12.9)
POTASSIUM SERPL-SCNC: 3.8 MMOL/L (ref 3.6–5.5)
PROT SERPL-MCNC: 6.6 G/DL (ref 6–8.2)
PROT UR QL STRIP: NEGATIVE MG/DL
RBC # BLD AUTO: 4.34 M/UL (ref 4.2–5.4)
RBC # URNS HPF: ABNORMAL /HPF (ref 0–2)
RBC UR QL AUTO: NEGATIVE
SODIUM SERPL-SCNC: 140 MMOL/L (ref 135–145)
SP GR UR STRIP.AUTO: 1.03
TROPONIN T SERPL-MCNC: <6 NG/L (ref 6–19)
UROBILINOGEN UR STRIP.AUTO-MCNC: 2 EU/DL
WBC # BLD AUTO: 7.3 K/UL (ref 4.8–10.8)
WBC #/AREA URNS HPF: ABNORMAL /HPF

## 2025-05-20 PROCEDURE — 93005 ELECTROCARDIOGRAM TRACING: CPT | Mod: TC | Performed by: EMERGENCY MEDICINE

## 2025-05-20 PROCEDURE — 99284 EMERGENCY DEPT VISIT MOD MDM: CPT

## 2025-05-20 PROCEDURE — 36415 COLL VENOUS BLD VENIPUNCTURE: CPT

## 2025-05-20 PROCEDURE — RXMED WILLOW AMBULATORY MEDICATION CHARGE: Performed by: EMERGENCY MEDICINE

## 2025-05-20 PROCEDURE — 81001 URINALYSIS AUTO W/SCOPE: CPT

## 2025-05-20 PROCEDURE — 80053 COMPREHEN METABOLIC PANEL: CPT

## 2025-05-20 PROCEDURE — 85025 COMPLETE CBC W/AUTO DIFF WBC: CPT

## 2025-05-20 PROCEDURE — 81025 URINE PREGNANCY TEST: CPT

## 2025-05-20 PROCEDURE — 84484 ASSAY OF TROPONIN QUANT: CPT

## 2025-05-20 PROCEDURE — 93005 ELECTROCARDIOGRAM TRACING: CPT | Mod: TC

## 2025-05-20 RX ORDER — NITROFURANTOIN 25; 75 MG/1; MG/1
100 CAPSULE ORAL 2 TIMES DAILY
Qty: 10 CAPSULE | Refills: 0 | Status: ACTIVE | OUTPATIENT
Start: 2025-05-20 | End: 2025-05-25

## 2025-05-20 ASSESSMENT — FIBROSIS 4 INDEX: FIB4 SCORE: 0.88

## 2025-05-20 NOTE — ED PROVIDER NOTES
"ED Provider Note    CHIEF COMPLAINT  Chief Complaint   Patient presents with    Dizziness     Pt states been having \"pre syncope\" episodes of almost passing out \"almost fainted sitting in chair almost blacked out\" tales a nitro tabled and then it goes away        EXTERNAL RECORDS REVIEWED  Outpatient Notes  [facility], cardiology    HPI/ROS  LIMITATION TO HISTORY   None  OUTSIDE HISTORIAN(S):  None    Kristin Balderrama is a 34 y.o. female who presents here for evaluation of lightheadedness.  Patient states that she felt as though she was going to \"pass out a couple of times over the last 3 days.  Patient has no fever chills or vomiting, she has no chest pain or shortness of breath.  The patient has no back pain or headache.  She states that she had a few episodes of none near syncope.  However she did not have a syncopal event.    PAST MEDICAL HISTORY   has a past medical history of Abdominal pain, Anginal syndrome, Apnea, sleep, Arthritis, ASTHMA, Back pain, Borderline personality disorder (HCC), Chickenpox, Chronic UTI (09/18/2010), Daytime sleepiness, Dental disorder (03/08/2021), Depression, Diabetes (HCC), Diarrhea, Disorder of thyroid, Fatigue, Frequent headaches, Heart burn, History of falling, Inappropriate sinus tachycardia (HCC) (2016), Migraine, Mitochondrial disease (HCC), Multiple personality disorder (HCC), Obesity, Pain, Painful joint, PCOS (polycystic ovarian syndrome), Pneumonia (2012 12/2020), POTS (postural orthostatic tachycardia syndrome), Psychosis ([facility]), Ringing in ears, Scoliosis, Shortness of breath, Sinus tachycardia (10/31/2013), Sleep apnea, Snoring, Supraventricular tachycardia (HCC) (04/10/2019), Transverse myelitis (HCC), Tuberculosis, Urinary bladder disorder, Urinary incontinence, Weakness, and Wears glasses.    SURGICAL HISTORY   has a past surgical history that includes neuro dest facet l/s w/ig sngl (04/21/2015); recovery (01/27/2016); [place] by laparoscopy (08/29/2010); " lumbar fusion anterior (08/21/2012); other cardiac surgery (01/2016); tonsillectomy; bowel stimulator insertion (07/13/2016); gastroscopy with balloon dilatation (N/A, 01/04/2017); muscle biopsy (Right, 01/26/2017); other abdominal surgery; laminotomy; bowel stimulator insertion (03/10/2021); lap,diagnostic abdomen (02/14/2022); ovarian cystectomy (Right, 02/14/2022); percut fix prox/neck femur fx (Left, 01/28/2023); laparoscopic inguinal hernia repair (Right, 07/21/2023); hernia repair (Right, 07/21/2023); cystoscopy,insert ureteral stent (Right, 2/12/2024); cysto/uretero/pyeloscopy, dx (Right, 2/12/2024); and lasertripsy (Right, 2/12/2024).    FAMILY HISTORY  Family History   Problem Relation Age of Onset    Hypertension Mother     Sleep Apnea Mother     Heart Disease Mother     Other Mother         hypothryod    No Known Problems Sister     Other Sister         Narcolepsy;fibromyalsia;bone;nerve    Genitourinary () Problems Sister         endometriosis    Hypertension Maternal Uncle     Heart Disease Maternal Grandmother     Hypertension Maternal Grandmother     Cancer Neg Hx        SOCIAL HISTORY  Social History     Tobacco Use    Smoking status: Never     Passive exposure: Never    Smokeless tobacco: Never   Vaping Use    Vaping status: Never Used   Substance and Sexual Activity    Alcohol use: No     Alcohol/week: 0.0 oz    Drug use: Never    Sexual activity: Not Currently     Birth control/protection: Implant       CURRENT MEDICATIONS  Home Medications       Reviewed by Na Jasmine R.N. (Registered Nurse) on 05/20/25 at 1301  Med List Status: Not Addressed     Medication Last Dose Status   acetaminophen (TYLENOL) 325 MG Tab  Active   albuterol 108 (90 Base) MCG/ACT Aero Soln inhalation aerosol  Active   amLODIPine (NORVASC) 5 MG Tab  Active   aspirin 81 MG EC tablet  Active   dicyclomine (BENTYL) 10 MG Cap  Active   diphenhydrAMINE (BENADRYL) 25 MG Tab  Active   docusate sodium (COLACE) 250 MG capsule   "Active   Etonogestrel (NEXPLANON SC)  Active   FIBER PO  Active   gabapentin (NEURONTIN) 400 MG Cap  Active   Galcanezumab-gnlm (EMGALITY) 120 MG/ML Solution Auto-injector  Active   Hydrocortisone Acetate 1 % Cream  Active   ipratropium-albuterol (COMBIVENT RESPIMAT)  MCG/ACT Aero Soln  Active   isosorbide mononitrate SR (IMDUR) 60 MG TABLET SR 24 HR  Active   ivabradine (CORLANOR) 7.5 MG Tab tablet  Active   lamoTRIgine (LAMICTAL) 25 MG Tab  Active   Melatonin 10 MG Tab  Active   methocarbamol (ROBAXIN) 500 MG Tab  Active   methocarbamol (ROBAXIN) 500 MG Tab  Active   methylPREDNISolone (MEDROL DOSEPAK) 4 MG Tablet Therapy Pack  Active   metoprolol SR (TOPROL XL) 50 MG TABLET SR 24 HR  Active   mupirocin (BACTROBAN) 2 % Ointment  Active   nitroglycerin (NITROLINGUAL) 0.4 MG/SPRAY Solution  Active   nitroglycerin (NITROSTAT) 0.4 MG SL Tab  Active   ofloxacin otic sol (FLOXIN OTIC) 0.3 % Solution  Active   omeprazole (PRILOSEC) 20 MG delayed-release capsule  Active   ondansetron (ZOFRAN ODT) 4 MG TABLET DISPERSIBLE  Active   polyethylene glycol-electrolytes (GOLYTELY) 236 GM Recon Soln  Active   Ranolazine 1000 MG TABLET SR 12 HR  Active   Rimegepant Sulfate (NURTEC) 75 MG TABLET DISPERSIBLE  Active   scopolamine (TRANSDERM SCOP, 1.5 MG,) 1 mg/72hr PATCH 72 HR  Active   senna-docusate (PERICOLACE OR SENOKOT S) 8.6-50 MG Tab  Active   spironolactone (ALDACTONE) 50 MG Tab  Active   sumatriptan (IMITREX) 20 MG/ACT nasal spray  Active   tizanidine (ZANAFLEX) 4 MG Tab  Active   topiramate (TOPAMAX) 50 MG tablet  Active   ziprasidone (GEODON) 40 MG Cap  Active                    ALLERGIES  Allergies[1]    PHYSICAL EXAM  VITAL SIGNS: /86   Pulse 69   Temp 36.1 °C (96.9 °F) (Temporal)   Resp 18   Ht 1.626 m (5' 4\")   Wt 117 kg (257 lb)   SpO2 98%   BMI 44.11 kg/m²    Constitutional: Well developed, well nourished. No acute distress.  HEENT: Normocephalic, atraumatic. Posterior pharynx clear and " moist.  Eyes:  EOMI. Normal sclera.  Neck: Supple, Full range of motion, nontender.  Chest/Pulmonary: clear to ausculation. Symmetrical expansion.   Cardio: Regular rate and rhythm with no murmur.   Abdomen: Soft, nontender. No peritoneal signs. No guarding. No palpable masses.  Musculoskeletal: No deformity, no edema, neurovascular intact.   Neuro: Clear speech, appropriate, cooperative, cranial nerves II-XII grossly intact.  Psych: Normal mood and affect    EKG; normal sinus rhythm at a rate of 65.  No ST elevation, no ST depression.  QTc is 605.  Compared to EKG from 2025.    EKG/LABS  Results for orders placed or performed during the hospital encounter of 25   EKG    Collection Time: 25 12:55 PM   Result Value Ref Range    Report       [facility] Emergency Dept.    Test Date:  2025  Pt Name:    HARLEY DE LA CRUZ              Department: ER  MRN:        [id number]                      Room:  Gender:     Female                       Technician: [id number]  :        1990                   Requested By:ER TRIAGE PROTOCOL  Order #:    [id number]                    Reading MD:    Measurements  Intervals                                Axis  Rate:       65                           P:          48  ME:         145                          QRS:        48  QRSD:       93                           T:          47  QT:         581  QTc:        605    Interpretive Statements  Sinus rhythm  Low voltage, precordial leads  Borderline T abnormalities, anterior leads  Prolonged QT interval  Compared to ECG 2025 05:52:02  Low QRS voltage now present  Prolonged QT interval now present  T-wave abnormality still present     CBC WITH DIFFERENTIAL    Collection Time: 25  2:01 PM   Result Value Ref Range    WBC 7.3 4.8 - 10.8 K/uL    RBC 4.34 4.20 - 5.40 M/uL    Hemoglobin 13.9 12.0 - 16.0 g/dL    Hematocrit 40.8 37.0 - 47.0 %    MCV 94.0 81.4 - 97.8 fL    MCH 32.0 27.0 - 33.0 pg     MCHC 34.1 32.2 - 35.5 g/dL    RDW 44.1 35.9 - 50.0 fL    Platelet Count 192 164 - 446 K/uL    MPV 11.5 9.0 - 12.9 fL    Neutrophils-Polys 72.20 (H) 44.00 - 72.00 %    Lymphocytes 18.60 (L) 22.00 - 41.00 %    Monocytes 7.00 0.00 - 13.40 %    Eosinophils 1.40 0.00 - 6.90 %    Basophils 0.40 0.00 - 1.80 %    Immature Granulocytes 0.40 0.00 - 0.90 %    Nucleated RBC 0.00 0.00 - 0.20 /100 WBC    Neutrophils (Absolute) 5.27 1.82 - 7.42 K/uL    Lymphs (Absolute) 1.36 1.00 - 4.80 K/uL    Monos (Absolute) 0.51 0.00 - 0.85 K/uL    Eos (Absolute) 0.10 0.00 - 0.51 K/uL    Baso (Absolute) 0.03 0.00 - 0.12 K/uL    Immature Granulocytes (abs) 0.03 0.00 - 0.11 K/uL    NRBC (Absolute) 0.00 K/uL   COMP METABOLIC PANEL    Collection Time: 05/20/25  2:01 PM   Result Value Ref Range    Sodium 140 135 - 145 mmol/L    Potassium 3.8 3.6 - 5.5 mmol/L    Chloride 111 96 - 112 mmol/L    Co2 16 (L) 20 - 33 mmol/L    Anion Gap 13.0 7.0 - 16.0    Glucose 142 (H) 65 - 99 mg/dL    Bun 17 8 - 22 mg/dL    Creatinine 0.91 0.50 - 1.40 mg/dL    Calcium 9.1 8.5 - 10.5 mg/dL    Correct Calcium 8.9 8.5 - 10.5 mg/dL    AST(SGOT) 18 12 - 45 U/L    ALT(SGPT) 33 2 - 50 U/L    Alkaline Phosphatase 69 30 - 99 U/L    Total Bilirubin 0.4 0.1 - 1.5 mg/dL    Albumin 4.3 3.2 - 4.9 g/dL    Total Protein 6.6 6.0 - 8.2 g/dL    Globulin 2.3 1.9 - 3.5 g/dL    A-G Ratio 1.9 g/dL   TROPONIN    Collection Time: 05/20/25  2:01 PM   Result Value Ref Range    Troponin T <6 6 - 19 ng/L   ESTIMATED GFR    Collection Time: 05/20/25  2:01 PM   Result Value Ref Range    GFR (CKD-EPI) 84 >60 mL/min/1.73 m 2   URINALYSIS,CULTURE IF INDICATED    Collection Time: 05/20/25  2:44 PM    Specimen: Urine, Clean Catch   Result Value Ref Range    Color Dark Yellow     Character Clear     Specific Gravity 1.028 <1.035    Ph 6.5 5.0 - 8.0    Glucose Negative Negative mg/dL    Ketones Trace (A) Negative mg/dL    Protein Negative Negative mg/dL    Bilirubin Negative Negative    Urobilinogen,  Urine 2.0 (A) <=1.0 EU/dL    Nitrite Negative Negative    Leukocyte Esterase Small (A) Negative    Occult Blood Negative Negative    Micro Urine Req Microscopic    URINE MICROSCOPIC (W/UA)    Collection Time: 25  2:44 PM   Result Value Ref Range    WBC 6-10 (A) /hpf    RBC 0-2 0 - 2 /hpf    Bacteria Moderate (A) None /hpf    Epithelial Cells 3-5 0 - 5 /hpf    Mucous Threads Present /hpf    Urine Casts 0-2 0 - 2 /lpf   HCG QUALITATIVE UR    Collection Time: 25  2:44 PM   Result Value Ref Range    Beta-Hcg Urine Negative Negative   EKG    Collection Time: 25  4:48 PM   Result Value Ref Range    Report       [facility] Emergency Dept.    Test Date:  2025  Pt Name:    HARLEY DE LA CRUZ              Department: ER  MRN:        [id number]                      Room:       [facility]  Gender:     Female                       Technician: [id number]  :        1990                   Requested By:EFREN OSORIO  Order #:    [id number]                    Reading MD:    Measurements  Intervals                                Axis  Rate:       55                           P:          21  NC:         154                          QRS:        3  QRSD:       98                           T:          12  QT:         441  QTc:        422    Interpretive Statements  Sinus bradycardia  Borderline T abnormalities, anterior leads  Compared to ECG 2025 12:55:18  Sinus rhythm no longer present  Prolonged QT interval no longer present  T-wave abnormality still present       *Note: Due to a large number of results and/or encounters for the requested time period, some results have not been displayed. A complete set of results can be found in Results Review.     EKG; sinus bradycardia at 55.  No ST elevation, no ST depression.  QTc is 422.  Compared to earlier EKG 2025.    RADIOLOGY/PROCEDURES   None      COURSE & MEDICAL DECISION MAKING    ASSESSMENT, COURSE AND PLAN  Care Narrative: This is a  "34-year-old female here for evaluation of near syncope.  Patient states that this has happened in the past, and she has POTS.  However this time she stated that she was concerned that she almost passed out.  She has no fever chills or vomiting, no chest pain or shortness of breath.  No abdominal pain that is new, as she always has abdominal pain.  Patient is seeing GI for this abdominal pain because of its chronicity.  Patient is nontoxic-appearing afebrile comfortable plan of discharge.  She will treat with antibiotics.        DISPOSITION AND DISCUSSIONS  I have discussed management of the patient with the following physicians and ARIES's: None    Discussion of management with other QHP or appropriate source(s): None    Escalation of care considered, and ultimately not performed: None    Barriers to care at this time, including but not limited to: None.     Decision tools and prescription drugs considered including, but not limited to: None.    FINAL DIAGNOSIS  1. Near syncope    2. Acute UTI         Electronically signed by: Valente Castellon D.O., 5/20/2025 1:53 PM           [1]   Allergies  Allergen Reactions    Cefdinir Shortness of Breath and Itching     Tolerated 1/18/17  Tolerates ceftriaxone  Tolerated augmentin 8/2019     Depakote [Divalproex Sodium] Unspecified     Muscle spasms/muscle pain and weakness      Doxycycline Anaphylaxis and Vomiting     Other reaction(s): pustules/blisters  Other reaction(s): stomach pain    Montelukast [Singulair] Unspecified     Cardiac effusion    Vancomycin Itching     Pt becomes flushed in face and chest.   RXN=7/10/16    Wound Dressing Adhesive Rash     By pt report-\"removes skin totally off\"    Amitriptyline Unspecified     Headaches      Amoxicillin Rash      Tolerates augmentin    Aripiprazole [Abilify] Unspecified     Headaches/muscle twitching      Clindamycin Nausea         Other reaction(s): nausea stomach pain    Erythromycin Rash     .  Other reaction(s): nausea " "stomach pain    Flomax [Tamsulosin Hydrochloride] Swelling    Hydromorphone      Other reaction(s): vomiting    Levaquin Unspecified     Severe muscle cramps in legs  RXN=unknown  Other reaction(s): leg muscle cramps    Metformin Unspecified     Increased lactic acid     Other reaction(s): itching and rash/nausea vomiting    Sulfa Drugs Hives, Itching, Myalgia and Unspecified     Muscle pain and weakness    Other reaction(s): unknown    Tamsulosin Swelling     Swelling of legs    Tape Rash     Tears skin off  coban with Tegaderm tape ok intermittently  RXN=ongoing    Sulfamethoxazole W-Trimethoprim Rash    Ciprofloxacin Rash          Keflex Rash     Pt states she gets a rash with this medication  Tolerates ceftriaxone  Can take with Benadryl    Levofloxacin Unspecified     Leg muscle cramps    Metronidazole Rash     \"Vision problems\"  Other reaction(s): vision problems     "

## 2025-05-20 NOTE — ED NOTES
Pt to restroom with this tech via wheelchair. Urine sample collected and sent. Pt returned to [facility] 13 recliner chair without incident.

## 2025-05-20 NOTE — Clinical Note
Kristin Balderrama was seen and treated in our emergency department on 5/20/2025.  She may return to work on 05/23/2025.       If you have any questions or concerns, please don't hesitate to call.      Valente Castellon D.O.

## 2025-05-20 NOTE — ED TRIAGE NOTES
"Chief Complaint   Patient presents with    Dizziness     Pt states been having \"pre syncope\" episodes of almost passing out \"almost fainted sitting in chair almost blacked out\" tales a nitro tabled and then it goes away      Ambulatory to triage for above and placed in WC states has hx POTS but feels more like cardiac issues has hx cardiac issues. Pt scared to fall and fx something     Extensive hx     Educated on triage process and wait times. Educated patient to update staff on any change in condition.       /86   Pulse 69   Temp 36.1 °C (96.9 °F) (Temporal)   Resp 18   Ht 1.626 m (5' 4\")   Wt 117 kg (257 lb)   SpO2 98%   BMI 44.11 kg/m²     "

## 2025-05-22 ENCOUNTER — PHYSICAL THERAPY (OUTPATIENT)
Dept: PHYSICAL THERAPY | Facility: REHABILITATION | Age: 35
End: 2025-05-22
Attending: STUDENT IN AN ORGANIZED HEALTH CARE EDUCATION/TRAINING PROGRAM
Payer: MEDICARE

## 2025-05-22 DIAGNOSIS — M79.671 FOOT PAIN, BILATERAL: Primary | ICD-10-CM

## 2025-05-22 DIAGNOSIS — M79.672 FOOT PAIN, BILATERAL: Primary | ICD-10-CM

## 2025-05-22 PROCEDURE — 97161 PT EVAL LOW COMPLEX 20 MIN: CPT

## 2025-05-22 PROCEDURE — 97110 THERAPEUTIC EXERCISES: CPT

## 2025-05-22 NOTE — OP THERAPY EVALUATION
Outpatient Physical Therapy  INITIAL EVALUATION    [facility] [address].  [address]  [place] [address]  Phone:  [phone]  Fax:  [phone]    Date of Evaluation: 05/22/2025    Patient: Kristin Balderrama  YOB: 1990  MRN: [id number]     Referring Provider: Fly Goodson M.D.  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    Visit Diagnoses     ICD-10-CM   1. Foot pain, bilateral  M79.671    M79.672       Date of onset of impairment: 5/27/2024    Subjective   History of Present Illness:     History of chief complaint:  Patient reports 1 yr.hx of bilateral achillies tendinopahy with R>L --mulitple bouts of boots for 2 week periods with some relief but still worse after returing to normal activity.  EDS with cornary artery disease with endothelial dysfunction, hx of POTS(10,000 mg /day).  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    Pain:     Aggravating factors:  Walk for 5' w/o pain  Bend forward with heel raise w/o pain  Full squat w/o pain  LEFS >40        Past Medical History[1]  Past Surgical History[2]    Precautions:       Objective   Observation and functional movement:  Wide rashmi with limited terminal stance      Range of motion and strength:    Unable to toe walk due to pain  Unable to heel raise4 due to pain  Heel walk--no limit  LEFS 24  R: gay inver 22  L 25 deg    PF: r 65 DF - 10--seated slide arom DF 9     L: pf:  DF-5--seated active heel slide into DF 10 deg    Knee to walll R 7 cm L:6 cm    Girth: 1' proximal to calc: R 21mm L 22mm    Palpation and joint mobility:     Ttp:          Therapeutic Treatments and Modalities:     Therapeutic  "Treatment and Modalities Summary: Heel slidesin sitting for DF  Knee to wall stretch --hourly  Heel raises bilateral to fatigue hourly --hep  [id number]--med bridge hep--texted to pt.    Time-based treatments/modalities:           Assessment, Response and Plan:   Assessment details:  Patient present with chronic history for bilateral achilles tendinopathy with hx of EDS and associated hypermobility syndrome Noted limited OKC/CKC DF with OKC calcaneal inversion > 20 deg bilaterally  with limited sls balance strategies.  Patient reports pain with heel raises and noted fear avodance (reviewed pain and how to manage pain--pain is not always bad\"). Patient presented with decreased arm swing, decrease transverse plane pelvic motion with ambulation.  Patient demonstrates weak posterior chain  and posterio-lateral chain strength and noted weak gasroc/soleus group L>R.  Patient reported TTP Fibularis longus/brevis and lateral gastroc.  Patient should do well if compliant with poc.   Prognosis: good    Goals:   Short Term Goals:   Walk for 5' w/o pain  Bend forward with heel raise w/o pain    LEFS >40  Short term goal time span:  2-4 weeks      Long Term Goals:    Walk for 15' w/o pain  Up 15 steps w/o pain  Full squat w/o pain  LEFS >60  Long term goal time span:  6-8 weeks    Plan:   Therapy options:  Physical therapy treatment to continue  Planned therapy interventions:  E Stim Unattended (CPT 99824), Manual Therapy (CPT 84974) and Therapeutic Exercise (CPT 00611)  Frequency:  2x week  Plan details:  Progressive strengthening isometric to isotonic as tolerated, IASTM, cfm, cupping, DN??/e-stim      Functional Assessment Used        Referring provider co-signature:  I have reviewed this plan of care and my co-signature certifies the need for services.    Certification Period: 05/22/2025 to  07/29/25    Physician Signature: ________________________________ Date: ______________               [1]   Past Medical " "History:  Diagnosis Date    Abdominal pain     Anginal syndrome     Random chest pain especially with tachycardia    Apnea, sleep     Arthritis     ASTHMA     when around smoke    Back pain     Borderline personality disorder (HCC)     Chickenpox     Chronic UTI 09/18/2010    Daytime sleepiness     Dental disorder 03/08/2021    Infected tooth    Depression     Diabetes (HCC)     Diarrhea     Incontinece    Disorder of thyroid     Hashimoto's    Fatigue     Frequent headaches     Heart burn     History of falling     Inappropriate sinus tachycardia (HCC) 2016    Statusp post ablation by Dr. Kumar.    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    Urinary bladder disorder     Suprapubic cath. 2/8/22: Not anymore.     Urinary incontinence     Weakness     Wears glasses    [2]   Past Surgical History:  Procedure Laterality Date    RI CYSTOSCOPY,INSERT URETERAL STENT Right 2/12/2024    Procedure: CYSTOSCOPY, WITH RIGHT URETEROSCOPY, WITH LITHOTRIPSY, WITH INSERTION OF RIGHT URETERAL STENT;  Surgeon: Josafat Roberson M.D.;  Location: [facility];  Service: Urology    RI CYSTO/URETERO/PYELOSCOPY, DX Right 2/12/2024    Procedure: URETEROSCOPY;  Surgeon: Josafat Roberson M.D.;  Location: [facility];  Service: Urology    LASERTRIPSY Right 2/12/2024    Procedure: LITHOTRIPSY, USING LASER;  Surgeon: Josafat Roberson M.D.;  Location: " [facility];  Service: Urology    LAPAROSCOPIC INGUINAL HERNIA REPAIR Right 07/21/2023    Procedure: LAPAROSCOPIC RIGHT INGUINAL HERNIA REPAIR WITH MESH;  Surgeon: Joe Noyola M.D.;  Location: [facility];  Service: General    HERNIA REPAIR Right 07/21/2023    PB PERCUT FIX PBOX/NECK FEMUR FX Left 01/28/2023    Procedure: FIXATION, HIP, USING CANNULATED SCREW;  Surgeon: Noman Abdul M.D.;  Location: [facility];  Service: Orthopedics    CO LAP,DIAGNOSTIC ABDOMEN  02/14/2022    Procedure: LAPAROSCOPY;  Surgeon: Seamus Pisano M.D.;  Location: SURGERY SAME DAY [facility];  Service: Gynecology    OVARIAN CYSTECTOMY Right 02/14/2022    Procedure: EXCISION, CYST, OVARY;  Surgeon: Seamus Pisano M.D.;  Location: SURGERY SAME DAY [facility];  Service: Gynecology    BOWEL STIMULATOR INSERTION  03/10/2021    Procedure: INSERTION, ELECTRODE LEADS AND PULSE GENERATOR, NEUROSTIMULATOR, SACRAL - REMOVAL OF INTERSTIM WITH REPLACEMENT OF SACRAL NEUROMODULATION DEVICE;  Surgeon: Joe Noyola M.D.;  Location: [facility];  Service: General    MUSCLE BIOPSY Right 01/26/2017    Procedure: MUSCLE BIOPSY - THIGH;  Surgeon: Isidro Vigil M.D.;  Location: [facility];  Service:     GASTROSCOPY WITH BALLOON DILATATION N/A 01/04/2017    Procedure: GASTROSCOPY WITH DILATATION;  Surgeon: Torres Vargas M.D.;  Location: [facility];  Service:     BOWEL STIMULATOR INSERTION  07/13/2016    Procedure: BOWEL STIMULATOR INSERTION FOR PERMANENT INTERSTIM SACRAL IMPLANT;  Surgeon: Joe Noyola M.D.;  Location: [facility];  Service:     RECOVERY  01/27/2016    Procedure: CATH LAB EP STUDY WITH SINUS NODE MODIFICATION ABHINAV;  Surgeon: Recoveryonly Surgery;  [place] [facility]  Service:     OTHER CARDIAC SURGERY  01/2016    cardiac ablation    NEURO DEST FACET L/S W/IG SNGL  04/21/2015    Performed by Reza Tabor at [facility]    LUMBAR  FUSION ANTERIOR  08/21/2012    Performed by SUSIE SAWANT at SURGERY [facility]    ALYSSA BY LAPAROSCOPY  08/29/2010    Performed by SHAYY JOHNS at SURGERY [facility] ORS    LAMINOTOMY      OTHER ABDOMINAL SURGERY      TONSILLECTOMY      tonsillectomy

## 2025-05-24 LAB — EKG IMPRESSION: NORMAL

## 2025-05-24 PROCEDURE — 93005 ELECTROCARDIOGRAM TRACING: CPT | Mod: TC | Performed by: EMERGENCY MEDICINE

## 2025-05-24 PROCEDURE — 99285 EMERGENCY DEPT VISIT HI MDM: CPT

## 2025-05-24 PROCEDURE — 93005 ELECTROCARDIOGRAM TRACING: CPT | Mod: TC

## 2025-05-25 ENCOUNTER — HOSPITAL ENCOUNTER (EMERGENCY)
Facility: MEDICAL CENTER | Age: 35
End: 2025-05-25
Attending: EMERGENCY MEDICINE
Payer: MEDICARE

## 2025-05-25 ENCOUNTER — APPOINTMENT (OUTPATIENT)
Dept: RADIOLOGY | Facility: MEDICAL CENTER | Age: 35
End: 2025-05-25
Attending: EMERGENCY MEDICINE
Payer: MEDICARE

## 2025-05-25 VITALS
HEIGHT: 64 IN | OXYGEN SATURATION: 97 % | BODY MASS INDEX: 45.58 KG/M2 | DIASTOLIC BLOOD PRESSURE: 70 MMHG | RESPIRATION RATE: 16 BRPM | HEART RATE: 57 BPM | SYSTOLIC BLOOD PRESSURE: 134 MMHG | WEIGHT: 267 LBS | TEMPERATURE: 97.9 F

## 2025-05-25 DIAGNOSIS — R07.9 ACUTE CHEST PAIN: Primary | ICD-10-CM

## 2025-05-25 DIAGNOSIS — R06.02 SHORTNESS OF BREATH: ICD-10-CM

## 2025-05-25 LAB
ALBUMIN SERPL BCP-MCNC: 4.4 G/DL (ref 3.2–4.9)
ALBUMIN/GLOB SERPL: 1.9 G/DL
ALP SERPL-CCNC: 69 U/L (ref 30–99)
ALT SERPL-CCNC: 30 U/L (ref 2–50)
ANION GAP SERPL CALC-SCNC: 12 MMOL/L (ref 7–16)
AST SERPL-CCNC: 15 U/L (ref 12–45)
BASOPHILS # BLD AUTO: 0.6 % (ref 0–1.8)
BASOPHILS # BLD: 0.04 K/UL (ref 0–0.12)
BILIRUB SERPL-MCNC: 0.4 MG/DL (ref 0.1–1.5)
BUN SERPL-MCNC: 17 MG/DL (ref 8–22)
CALCIUM ALBUM COR SERPL-MCNC: 9.1 MG/DL (ref 8.5–10.5)
CALCIUM SERPL-MCNC: 9.4 MG/DL (ref 8.5–10.5)
CHLORIDE SERPL-SCNC: 112 MMOL/L (ref 96–112)
CO2 SERPL-SCNC: 17 MMOL/L (ref 20–33)
CREAT SERPL-MCNC: 0.96 MG/DL (ref 0.5–1.4)
EOSINOPHIL # BLD AUTO: 0.1 K/UL (ref 0–0.51)
EOSINOPHIL NFR BLD: 1.4 % (ref 0–6.9)
ERYTHROCYTE [DISTWIDTH] IN BLOOD BY AUTOMATED COUNT: 46.8 FL (ref 35.9–50)
GFR SERPLBLD CREATININE-BSD FMLA CKD-EPI: 79 ML/MIN/1.73 M 2
GLOBULIN SER CALC-MCNC: 2.3 G/DL (ref 1.9–3.5)
GLUCOSE SERPL-MCNC: 102 MG/DL (ref 65–99)
HCG SERPL QL: NEGATIVE
HCT VFR BLD AUTO: 42.6 % (ref 37–47)
HGB BLD-MCNC: 14.2 G/DL (ref 12–16)
IMM GRANULOCYTES # BLD AUTO: 0.02 K/UL (ref 0–0.11)
IMM GRANULOCYTES NFR BLD AUTO: 0.3 % (ref 0–0.9)
LIPASE SERPL-CCNC: 28 U/L (ref 11–82)
LIPASE SERPL-CCNC: 29 U/L (ref 11–82)
LYMPHOCYTES # BLD AUTO: 2.15 K/UL (ref 1–4.8)
LYMPHOCYTES NFR BLD: 30.2 % (ref 22–41)
MCH RBC QN AUTO: 32.1 PG (ref 27–33)
MCHC RBC AUTO-ENTMCNC: 33.3 G/DL (ref 32.2–35.5)
MCV RBC AUTO: 96.2 FL (ref 81.4–97.8)
MONOCYTES # BLD AUTO: 0.69 K/UL (ref 0–0.85)
MONOCYTES NFR BLD AUTO: 9.7 % (ref 0–13.4)
NEUTROPHILS # BLD AUTO: 4.12 K/UL (ref 1.82–7.42)
NEUTROPHILS NFR BLD: 57.8 % (ref 44–72)
NRBC # BLD AUTO: 0 K/UL
NRBC BLD-RTO: 0 /100 WBC (ref 0–0.2)
NT-PROBNP SERPL IA-MCNC: 110 PG/ML (ref 0–125)
PLATELET # BLD AUTO: 216 K/UL (ref 164–446)
PMV BLD AUTO: 11.6 FL (ref 9–12.9)
POTASSIUM SERPL-SCNC: 3.9 MMOL/L (ref 3.6–5.5)
PROT SERPL-MCNC: 6.7 G/DL (ref 6–8.2)
RBC # BLD AUTO: 4.43 M/UL (ref 4.2–5.4)
SODIUM SERPL-SCNC: 141 MMOL/L (ref 135–145)
TROPONIN T SERPL-MCNC: 8 NG/L (ref 6–19)
TROPONIN T SERPL-MCNC: <6 NG/L (ref 6–19)
WBC # BLD AUTO: 7.1 K/UL (ref 4.8–10.8)

## 2025-05-25 PROCEDURE — 84484 ASSAY OF TROPONIN QUANT: CPT

## 2025-05-25 PROCEDURE — 80053 COMPREHEN METABOLIC PANEL: CPT

## 2025-05-25 PROCEDURE — 83880 ASSAY OF NATRIURETIC PEPTIDE: CPT

## 2025-05-25 PROCEDURE — 71275 CT ANGIOGRAPHY CHEST: CPT

## 2025-05-25 PROCEDURE — 71045 X-RAY EXAM CHEST 1 VIEW: CPT

## 2025-05-25 PROCEDURE — 700117 HCHG RX CONTRAST REV CODE 255: Mod: UD | Performed by: EMERGENCY MEDICINE

## 2025-05-25 PROCEDURE — 36415 COLL VENOUS BLD VENIPUNCTURE: CPT

## 2025-05-25 PROCEDURE — 96374 THER/PROPH/DIAG INJ IV PUSH: CPT | Mod: XU

## 2025-05-25 PROCEDURE — 83690 ASSAY OF LIPASE: CPT

## 2025-05-25 PROCEDURE — 84703 CHORIONIC GONADOTROPIN ASSAY: CPT

## 2025-05-25 PROCEDURE — 85025 COMPLETE CBC W/AUTO DIFF WBC: CPT

## 2025-05-25 PROCEDURE — 700111 HCHG RX REV CODE 636 W/ 250 OVERRIDE (IP): Mod: UD | Performed by: EMERGENCY MEDICINE

## 2025-05-25 PROCEDURE — 96375 TX/PRO/DX INJ NEW DRUG ADDON: CPT | Mod: XU

## 2025-05-25 PROCEDURE — 93005 ELECTROCARDIOGRAM TRACING: CPT | Mod: TC | Performed by: EMERGENCY MEDICINE

## 2025-05-25 RX ORDER — MORPHINE SULFATE 4 MG/ML
4 INJECTION INTRAVENOUS ONCE
Status: COMPLETED | OUTPATIENT
Start: 2025-05-25 | End: 2025-05-25

## 2025-05-25 RX ORDER — ONDANSETRON 2 MG/ML
4 INJECTION INTRAMUSCULAR; INTRAVENOUS ONCE
Status: COMPLETED | OUTPATIENT
Start: 2025-05-25 | End: 2025-05-25

## 2025-05-25 RX ADMIN — ONDANSETRON 4 MG: 2 INJECTION INTRAMUSCULAR; INTRAVENOUS at 01:35

## 2025-05-25 RX ADMIN — IOHEXOL 80 ML: 350 INJECTION, SOLUTION INTRAVENOUS at 02:30

## 2025-05-25 RX ADMIN — MORPHINE SULFATE 4 MG: 4 INJECTION INTRAVENOUS at 01:35

## 2025-05-25 ASSESSMENT — HEART SCORE
RISK FACTORS: 1-2 RISK FACTORS
ECG: NORMAL
TROPONIN: LESS THAN OR EQUAL TO NORMAL LIMIT
AGE: <45
HISTORY: SLIGHTLY SUSPICIOUS
HEART SCORE: 1

## 2025-05-25 ASSESSMENT — FIBROSIS 4 INDEX: FIB4 SCORE: 0.57

## 2025-05-25 NOTE — ED NOTES
PIV removed, catheter intact. Discharge education provided by ERP. Discharge paperwork signed by patient. All questions answered. All belongings with patient. Patient ambulated to lobby unassisted with steady gait.

## 2025-05-25 NOTE — ED TRIAGE NOTES
Chief Complaint   Patient presents with    Chest Pain     BIBA for cp since this morning with SOB.      Shortness of Breath     Pt took 324mg ASA and nitro x3 prior to arrival     Vitals:    05/24/25 2356   BP: 135/85   Pulse: 61   Resp: 14   Temp: 36.6 °C (97.9 °F)   SpO2: 97%     Patient ambulatory to triage for above complaint. Patient A&Ox4, GCS 15, patient speaking in full sentences. Equal and unlabored respirations. Patient educated on triage process and encouraged to notify staff if condition worsens. Appropriate protocols ordered. Patient returned to the lobby in stable condition.

## 2025-05-25 NOTE — ED PROVIDER NOTES
ER Provider Note    Scribed for  Renzo Weaver D.O. by Katelyn Ferrell. 5/25/2025   12:58 AM    Primary Care Provider: Fly Goodson M.D.    CHIEF COMPLAINT  Chief Complaint   Patient presents with    Chest Pain     BIBA for cp since this morning with SOB.      Shortness of Breath     EXTERNAL RECORDS REVIEWED  Other Patient was in the ED on 5/20/2025 for dizziness and near syncope    HPI/ROS  LIMITATION TO HISTORY   Select: : None  OUTSIDE HISTORIAN(S):  None     Kristin Balderrama is a 34 y.o. female who presents to the ED for evaluation of chest pain onset fourteen hours ago. The patient states that she began to experience pain in the center of her chest and left shoulder pain. She adds that then her upper lip turned blue and dissipated back to normal color. She states she went to bed this evening and then notes heart palpitation with shortness of breath. She confirms she takes medications for coronary microvascular disease, POTS, asthma and GI issues.  She denies any use of blood thinners bit adds she take aspirin    PAST MEDICAL HISTORY  Past Medical History[1]    SURGICAL HISTORY  Past Surgical History[2]    FAMILY HISTORY  Family History   Problem Relation Age of Onset    Hypertension Mother     Sleep Apnea Mother     Heart Disease Mother     Other Mother         hypothryod    No Known Problems Sister     Other Sister         Narcolepsy;fibromyalsia;bone;nerve    Genitourinary () Problems Sister         endometriosis    Hypertension Maternal Uncle     Heart Disease Maternal Grandmother     Hypertension Maternal Grandmother     Cancer Neg Hx        SOCIAL HISTORY   reports that she has never smoked. She has never been exposed to tobacco smoke. She has never used smokeless tobacco. She reports that she does not drink alcohol and does not use drugs.    CURRENT MEDICATIONS  Previous Medications    ACETAMINOPHEN (TYLENOL) 325 MG TAB    Take 2 Tablets by mouth every 6 hours.    ALBUTEROL 108 (90  BASE) MCG/ACT AERO SOLN INHALATION AEROSOL    Inhale 1-2 Puffs every four hours as needed for Shortness of Breath.    AMLODIPINE (NORVASC) 5 MG TAB    Take 1 tablet by mouth every day.    ASPIRIN 81 MG EC TABLET    Take 1 Tablet by mouth every day.    DICYCLOMINE (BENTYL) 10 MG CAP    Take 1 Capsule by mouth 2 times a day as needed for abdominal cramping/discomfort    DIPHENHYDRAMINE (BENADRYL) 25 MG TAB    Take 50 mg by mouth at bedtime.    DOCUSATE SODIUM (COLACE) 250 MG CAPSULE    Take 250 mg by mouth 2 times a day.    ETONOGESTREL (NEXPLANON SC)    Inject 1 Application under the skin one time.    FIBER PO    Take 3 Tablets by mouth every day.    GABAPENTIN (NEURONTIN) 400 MG CAP    TAKE 3 CAPSULES BY MOUTH THREE TIMES DAILY.  FOR 30 DAY SUPPLY. DX: M79.7    GALCANEZUMAB-GNLM (EMGALITY) 120 MG/ML SOLUTION AUTO-INJECTOR    Inject 1 mL subcutaneously every month.    HYDROCORTISONE ACETATE 1 % CREAM    Apply 1 Application topically 2 times a day for 7 days.    IPRATROPIUM-ALBUTEROL (COMBIVENT RESPIMAT)  MCG/ACT AERO SOLN    Inhale 1 Puff 4 times a day as needed (Shortness of breath).    ISOSORBIDE MONONITRATE SR (IMDUR) 60 MG TABLET SR 24 HR    Take 1 Tablet by mouth every morning.    IVABRADINE (CORLANOR) 7.5 MG TAB TABLET    Take 1 Tablet by mouth 2 times a day with meals.    LAMOTRIGINE (LAMICTAL) 25 MG TAB    Take 2 Tablets by mouth 2 times a day.    MELATONIN 10 MG TAB    Take 10 mg by mouth at bedtime.    METHOCARBAMOL (ROBAXIN) 500 MG TAB    Take 1 Tablet by mouth 4 times a day.    METHOCARBAMOL (ROBAXIN) 500 MG TAB    Take 1 Tablet by mouth 4 times a day.    METHYLPREDNISOLONE (MEDROL DOSEPAK) 4 MG TABLET THERAPY PACK    Use as directed on packaging    METOPROLOL SR (TOPROL XL) 50 MG TABLET SR 24 HR    Take 1 Tablet by mouth 2 times a day.    MUPIROCIN (BACTROBAN) 2 % OINTMENT    Apply 1 Application topically 2 times a day for 10 days.    NITROFURANTOIN (MACROBID) 100 MG CAP    Take 1 Capsule by  "mouth 2 times a day for 5 days.    NITROGLYCERIN (NITROLINGUAL) 0.4 MG/SPRAY SOLUTION    Place 1 Spray under the tongue as needed (chest pains).    NITROGLYCERIN (NITROSTAT) 0.4 MG SL TAB    Place 1 Tablet under the tongue as needed for Chest Pain (may repeat for chest pain every 5 mins not to exceed 3 doses).    OMEPRAZOLE (PRILOSEC) 20 MG DELAYED-RELEASE CAPSULE    Take 2 Capsules by mouth 2 times a day.    ONDANSETRON (ZOFRAN ODT) 4 MG TABLET DISPERSIBLE    Dissolve 1 Tablet by mouth every four hours as needed for Nausea/Vomiting (give PO if no IV route available).    POLYETHYLENE GLYCOL-ELECTROLYTES (GOLYTELY) 236 GM RECON SOLN    Follow [facility] instructions handout    RANOLAZINE 1000 MG TABLET SR 12 HR    Take 1 Tablet by mouth 2 times a day.    RIMEGEPANT SULFATE (NURTEC) 75 MG TABLET DISPERSIBLE    Take 1 tablet by mouth at onset of migraine or aura okay to repeat in 24 hours if needed.    SCOPOLAMINE (TRANSDERM SCOP, 1.5 MG,) 1 MG/72HR PATCH 72 HR    Place 1 Patch on the skin every 72 hours.    SENNA-DOCUSATE (PERICOLACE OR SENOKOT S) 8.6-50 MG TAB    Take 1 Tablet by mouth every day.    SPIRONOLACTONE (ALDACTONE) 50 MG TAB    Take 1 Tablet by mouth every day.    SUMATRIPTAN (IMITREX) 20 MG/ACT NASAL SPRAY    Give 1 dose at onset headache okay to repeat in 2 hours if needed for max of 2 doses in a 24 hour timeframe.    TIZANIDINE (ZANAFLEX) 4 MG TAB    Take 4 mg by mouth in the morning, at noon, and at bedtime.    TOPIRAMATE (TOPAMAX) 50 MG TABLET    Take 1 Tablet by mouth 2 times a day.    ZIPRASIDONE (GEODON) 40 MG CAP    Take 1 capsule by mouth at bedtime.       ALLERGIES  Allergies[3]     PHYSICAL EXAM  /85   Pulse 61   Temp 36.6 °C (97.9 °F) (Temporal)   Resp 14   Ht 1.626 m (5' 4\")   Wt 121 kg (267 lb)   SpO2 97%   BMI 45.83 kg/m²    General: No acute distress, obese  Neuro: Awake alert and oriented, muscle strength sensation normal  Neck: Supple  Cardiac: Regular rate and " rhythm  Pulmonary: Clear to auscultation bilaterally no distress  Abdomen: Soft nontender nondistended  Back: Nontender  Psych: Normal  Skin: Pink warm dry  Extremities: Full range of motion, muscle strength sensation intact 2+ pulses    DIAGNOSTIC STUDIES/PROCEDURES  Labs:   Labs Reviewed   COMP METABOLIC PANEL - Abnormal; Notable for the following components:       Result Value    Co2 17 (*)     Glucose 102 (*)     All other components within normal limits   CBC WITH DIFFERENTIAL   PROBRAIN NATRIURETIC PEPTIDE, NT   TROPONIN   HCG QUAL SERUM   TROPONIN   LIPASE   ESTIMATED GFR   LIPASE     I have independently interpreted the above labs    EKG:   Results for orders placed or performed during the hospital encounter of 25   EKG   Result Value Ref Range    Report       [facility] Emergency Dept.    Test Date:  2025  Pt Name:    HARLEY DE LA CRUZ              Department: ER  MRN:        [id number]                      Room:  Gender:     Female                       Technician: [id number]  :        1990                   Requested By:ER TRIAGE PROTOCOL  Order #:    [id number]                    Reading MD:    Measurements  Intervals                                Axis  Rate:       62                           P:          16  KY:         149                          QRS:        0  QRSD:       100                          T:          2  QT:         430  QTc:        437    Interpretive Statements  Sinus rhythm  Probable left ventricular hypertrophy  Borderline T abnormalities, anterior leads  Baseline wander in lead(s) V1  Compared to ECG 2025 16:48:16  Sinus bradycardia no longer present  T-wave abnormality still present       *Note: Due to a large number of results and/or encounters for the requested time period, some results have not been displayed. A complete set of results can be found in Results Review.     I have independently interpreted this EKG    Radiology:   This attending  emergency physician has independently interpreted the diagnostic imaging associated with this visit and is awaiting the final reading from the radiologist.   Preliminary interpretation is a follows: No acute disease.  No PE  Radiologist interpretation:   CT-CTA CHEST PULMONARY ARTERY W/ RECONS   Final Result         1.  No pulmonary embolus appreciated.      DX-CHEST-PORTABLE (1 VIEW)   Final Result         1.  No acute cardiopulmonary disease.           COURSE & MEDICAL DECISION MAKING     CHEST PAIN:   HEART Score for Major Cardiac Events  HEART Score     History: Slightly suspicious  ECG: Normal  Age: <45  Risk Factors: 1-2 risk factors  Troponin: Less than or equal to normal limit    Heart Score: 1    Total Score   0-3 Points = Low Score, risk of MACE 0.9-1.7%.  4-6 Points = Moderate Score, risk of MACE 12-16.6%  7-10 Points = High Score, risk of MACE 50-65%    INITIAL ASSESSMENT, COURSE AND PLAN  Differential diagnoses include but not limited to: POTs, Pulmonary embolism, esophagitis, pericarditis, acute coronary syndrome.    Care Narrative: 34-year-old with a history of POTS, other past medical history above, complaining chest pain shortness of breath.  Exam normal.    12:58 AM - Patient was first seen and evaluated at bedside.  Labs and imaging and reevaluation planned.       ED COURSE AND ADDITIONAL PROBLEMS    Acute chest pain: Low heart score.  Normal troponin normal EKG normal CT angiogram of the chest.  Cardiopulmonary exam benign.  Reevaluated patient is feeling much better and the cardiopulmonary exam remains benign.  This is an informed discharge.  Patient does understand there are some mild risk of major adverse cardiac event need for intervention heart attack prolonged disability and death.  Patient is can follow-up with her primary care and cardiology or return to emergency department symptoms recur.    Shortness of breath: Again cardiopulmonary exam benign CT angiogram of the chest negative.   Follow-up with primary care and cardiology.    DISPOSITION AND DISCUSSIONS    I have discussed management of the patient with the following physicians and ARIES's: None    Discussion of management with other [facility] or appropriate source(s): CT technologist    Escalation of care considered, and ultimately not performed: acute inpatient care management, however at this time, the patient is most appropriate for outpatient management.    Barriers to care at this time, including but not limited to: None.     Decision tools and prescription drugs considered including, but not limited to: We recommended patient continue her home meds..    The patient will return for new or worsening symptoms and is stable at the time of discharge.    The patient is referred to a primary physician for blood pressure management, diabetic screening, and for all other preventative health concerns.        DISPOSITION:  Patient will be discharged home in stable condition.    FOLLOW UP:  Fly Goodson M.D.  [address] [place] [phone]    Schedule an appointment as soon as possible for a visit in 1 day      [facility], Emergency Dept  [address] [place] [address] [id number] [phone]    As needed, If symptoms worsen      OUTPATIENT MEDICATIONS:  Discharge Medication List as of 5/25/2025  5:21 AM            FINAL DIAGNOSIS  1. Acute chest pain Acute   2. Shortness of breath Acute        Katelyn RODRIGUEZ (Eva), am scribing for, and in the presence of, Renzo Weaver D.O..    Electronically signed by: Katelyn Espinosa), 5/25/2025    Renzo RODRIGUEZ D.O. personally performed the services described in this documentation, as scribed by Katelyn Ferrell in my presence, and it is both accurate and complete.      The note accurately reflects work and decisions made by me.  Renzo Weaver D.O.  5/25/2025  6:52 AM         [1]   Past Medical History:  Diagnosis Date     "Abdominal pain     Anginal syndrome     Random chest pain especially with tachycardia    Apnea, sleep     Arthritis     ASTHMA     when around smoke    Back pain     Borderline personality disorder (HCC)     Chickenpox     Chronic UTI 09/18/2010    Daytime sleepiness     Dental disorder 03/08/2021    Infected tooth    Depression     Diabetes (HCC)     Diarrhea     Incontinece    Disorder of thyroid     Hashimoto's    Fatigue     Frequent headaches     Heart burn     History of falling     Inappropriate sinus tachycardia (HCC) 2016    Statusp post ablation by Dr. Kumar.    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    Urinary bladder disorder     Suprapubic cath. 2/8/22: Not anymore.     Urinary incontinence     Weakness     Wears glasses    [2]   Past Surgical History:  Procedure Laterality Date    NJ CYSTOSCOPY,INSERT URETERAL STENT Right 2/12/2024    Procedure: CYSTOSCOPY, WITH RIGHT URETEROSCOPY, WITH LITHOTRIPSY, WITH INSERTION OF RIGHT URETERAL STENT;  Surgeon: Josafat Roberson M.D.;  Location: [facility];  Service: Urology    NJ CYSTO/URETERO/PYELOSCOPY, DX Right 2/12/2024    Procedure: URETEROSCOPY;  Surgeon: Josafat Roberson M.D.;  Location: [facility];  Service: Urology    LASERTRIPSY Right 2/12/2024    Procedure: LITHOTRIPSY, USING LASER;  Surgeon: Josafat Roberson M.D.;  Location: [facility];  " Service: Urology    LAPAROSCOPIC INGUINAL HERNIA REPAIR Right 07/21/2023    Procedure: LAPAROSCOPIC RIGHT INGUINAL HERNIA REPAIR WITH MESH;  Surgeon: Joe Noyola M.D.;  Location: [facility];  Service: General    HERNIA REPAIR Right 07/21/2023    PB PERCUT FIX PBOX/NECK FEMUR FX Left 01/28/2023    Procedure: FIXATION, HIP, USING CANNULATED SCREW;  Surgeon: Noman Abdul M.D.;  Location: [facility];  Service: Orthopedics    NJ LAP,DIAGNOSTIC ABDOMEN  02/14/2022    Procedure: LAPAROSCOPY;  Surgeon: Seamus Pisano M.D.;  Location: SURGERY SAME DAY [facility];  Service: Gynecology    OVARIAN CYSTECTOMY Right 02/14/2022    Procedure: EXCISION, CYST, OVARY;  Surgeon: Seamus Pisano M.D.;  Location: SURGERY SAME DAY [facility];  Service: Gynecology    BOWEL STIMULATOR INSERTION  03/10/2021    Procedure: INSERTION, ELECTRODE LEADS AND PULSE GENERATOR, NEUROSTIMULATOR, SACRAL - REMOVAL OF INTERSTIM WITH REPLACEMENT OF SACRAL NEUROMODULATION DEVICE;  Surgeon: Joe Noyola M.D.;  Location: [facility];  Service: General    MUSCLE BIOPSY Right 01/26/2017    Procedure: MUSCLE BIOPSY - THIGH;  Surgeon: Isidro Vigil M.D.;  Location: [facility];  Service:     GASTROSCOPY WITH BALLOON DILATATION N/A 01/04/2017    Procedure: GASTROSCOPY WITH DILATATION;  Surgeon: Torres Vargas M.D.;  Location: [facility];  Service:     BOWEL STIMULATOR INSERTION  07/13/2016    Procedure: BOWEL STIMULATOR INSERTION FOR PERMANENT INTERSTIM SACRAL IMPLANT;  Surgeon: Joe Noyola M.D.;  Location: [facility];  Service:     RECOVERY  01/27/2016    Procedure: CATH LAB EP STUDY WITH SINUS NODE MODIFICATION ABHINAV;  Surgeon: Recoveryonly Surgery;  Location: SURGERY PRE-POST PROC UNIT [facility];  Service:     OTHER CARDIAC SURGERY  01/2016    cardiac ablation    NEURO DEST FACET L/S W/IG SNGL  04/21/2015    Performed by Reza Tabor at [facility]    LUMBAR FUSION ANTERIOR   "08/21/2012    Performed by SUSIE SAWANT at SURGERY [facility] ORS    ALYSSA BY LAPAROSCOPY  08/29/2010    Performed by SHAYY JOHNS at SURGERY [facility] ORS    LAMINOTOMY      OTHER ABDOMINAL SURGERY      TONSILLECTOMY      tonsillectomy   [3]   Allergies  Allergen Reactions    Cefdinir Shortness of Breath and Itching     Tolerated 1/18/17  Tolerates ceftriaxone  Tolerated augmentin 8/2019     Depakote [Divalproex Sodium] Unspecified     Muscle spasms/muscle pain and weakness      Doxycycline Anaphylaxis and Vomiting     Other reaction(s): pustules/blisters  Other reaction(s): stomach pain    Montelukast [Singulair] Unspecified     Cardiac effusion    Vancomycin Itching     Pt becomes flushed in face and chest.   RXN=7/10/16    Wound Dressing Adhesive Rash     By pt report-\"removes skin totally off\"    Amitriptyline Unspecified     Headaches      Amoxicillin Rash      Tolerates augmentin    Aripiprazole [Abilify] Unspecified     Headaches/muscle twitching      Clindamycin Nausea         Other reaction(s): nausea stomach pain    Erythromycin Rash     .  Other reaction(s): nausea stomach pain    Flomax [Tamsulosin Hydrochloride] Swelling    Hydromorphone      Other reaction(s): vomiting    Levaquin Unspecified     Severe muscle cramps in legs  RXN=unknown  Other reaction(s): leg muscle cramps    Metformin Unspecified     Increased lactic acid     Other reaction(s): itching and rash/nausea vomiting    Sulfa Drugs Hives, Itching, Myalgia and Unspecified     Muscle pain and weakness    Other reaction(s): unknown    Tamsulosin Swelling     Swelling of legs    Tape Rash     Tears skin off  coban with Tegaderm tape ok intermittently  RXN=ongoing    Sulfamethoxazole W-Trimethoprim Rash    Ciprofloxacin Rash          Keflex Rash     Pt states she gets a rash with this medication  Tolerates ceftriaxone  Can take with Benadryl    Levofloxacin Unspecified     Leg muscle cramps    Metronidazole Rash     \"Vision " "problems\"  Other reaction(s): vision problems     "

## 2025-05-25 NOTE — DISCHARGE INSTRUCTIONS
Thankfully all of your labs and imaging are quite reassuring.  We recommend close follow-up with the primary care and cardiology in the next couple days.  Return to the emergency department if anything worsens.

## 2025-05-27 PROCEDURE — RXMED WILLOW AMBULATORY MEDICATION CHARGE: Performed by: STUDENT IN AN ORGANIZED HEALTH CARE EDUCATION/TRAINING PROGRAM

## 2025-05-28 ENCOUNTER — PHYSICAL THERAPY (OUTPATIENT)
Dept: PHYSICAL THERAPY | Facility: REHABILITATION | Age: 35
End: 2025-05-28
Attending: STUDENT IN AN ORGANIZED HEALTH CARE EDUCATION/TRAINING PROGRAM
Payer: MEDICARE

## 2025-05-28 DIAGNOSIS — M79.671 FOOT PAIN, BILATERAL: Primary | ICD-10-CM

## 2025-05-28 DIAGNOSIS — M79.672 FOOT PAIN, BILATERAL: Primary | ICD-10-CM

## 2025-05-28 PROCEDURE — 97112 NEUROMUSCULAR REEDUCATION: CPT

## 2025-05-28 PROCEDURE — 97110 THERAPEUTIC EXERCISES: CPT

## 2025-05-28 NOTE — OP THERAPY DAILY TREATMENT
"  Outpatient Physical Therapy  DAILY TREATMENT     [facility] [address].  [address]  [place] [address]  Phone:  [phone]  Fax:  [phone]    Date: 05/28/2025    Patient: Kristin Balderrama  YOB: 1990  MRN: [id number]     Time Calculation    Start time: 0830  Stop time: 0900 Time Calculation (min): 30 minutes         Chief Complaint: No chief complaint on file.    Visit #: 3    SUBJECTIVE:  Went to ED due to angina complaints and lots of rest at home with not much ex.--feel a little better since she has rested a lot past few days    OBJECTIVE:            Therapeutic Treatments and Modalities:     Therapeutic Treatment and Modalities Summary: Gait trg with erect posture and arm swing x 300 ft.  Knee to wall stretch -10-20\" with progressive  active stretch--stretch first then heel raises  Heel raises bilateral to fatigue hourly --hep  Bilateral blue bosu  Standing balance hep  Discussed pt. Fear of falling if she gets too tired.  Discussed energy management  Discussed balance progression and not to look at her feet\  Hep -- KMEGWH    Time-based treatments/modalities:    Physical Therapy Timed Treatment Charges  Gait training minutes (CPT 06626): 10 minutes  Neuromusc re-ed, balance, coor, post minutes (CPT 15916): 10 minutes  Therapeutic exercise minutes (CPT 43687): 10 minutes      Pain rating (1-10) before treatment:  0  Pain rating (1-10) after treatment:  0    ASSESSMENT:   Less pain but struggled with activity tolerance and significant fear avoidance of patient worried if she gets too tired that she will fall and break a hip--mostly struggles with balance and endurance    PLAN/RECOMMENDATIONS:   Gait trg, balance trg. Standing and walking endurance core stab tandem balance          "

## 2025-05-29 ENCOUNTER — APPOINTMENT (OUTPATIENT)
Dept: URGENT CARE | Facility: PHYSICIAN GROUP | Age: 35
End: 2025-05-29
Payer: MEDICARE

## 2025-05-30 ENCOUNTER — OFFICE VISIT (OUTPATIENT)
Dept: URGENT CARE | Facility: CLINIC | Age: 35
End: 2025-05-30
Payer: MEDICARE

## 2025-05-30 VITALS
OXYGEN SATURATION: 97 % | SYSTOLIC BLOOD PRESSURE: 130 MMHG | HEART RATE: 68 BPM | DIASTOLIC BLOOD PRESSURE: 82 MMHG | WEIGHT: 255.4 LBS | TEMPERATURE: 97.5 F | BODY MASS INDEX: 43.6 KG/M2 | RESPIRATION RATE: 20 BRPM | HEIGHT: 64 IN

## 2025-05-30 DIAGNOSIS — R21 RASH AND NONSPECIFIC SKIN ERUPTION: Primary | ICD-10-CM

## 2025-05-30 PROCEDURE — RXMED WILLOW AMBULATORY MEDICATION CHARGE

## 2025-05-30 RX ORDER — NYSTATIN 100000 U/G
1 CREAM TOPICAL 2 TIMES DAILY
Qty: 30 G | Refills: 0 | Status: SHIPPED | OUTPATIENT
Start: 2025-05-30 | End: 2025-06-14

## 2025-05-30 ASSESSMENT — ENCOUNTER SYMPTOMS
CHILLS: 0
FEVER: 0
GASTROINTESTINAL NEGATIVE: 1
COUGH: 0
NEUROLOGICAL NEGATIVE: 1
EYES NEGATIVE: 1
MUSCULOSKELETAL NEGATIVE: 1
SHORTNESS OF BREATH: 0

## 2025-05-30 ASSESSMENT — FIBROSIS 4 INDEX: FIB4 SCORE: 0.44

## 2025-05-30 NOTE — PROGRESS NOTES
"Subjective:     Chief Complaint   Patient presents with    Follow-Up     [id number] Armpit rash/       HPI:  Kristin Balderrama is a 34 y.o. female who presents for Rash to bilateral armpits. She reports it is painful, feels like a burning sensation. Not itchy. Seen at  previously for the same, she has been using hydrocortisone, Hibiclens, mupirocin w/o relief.She does seen derm, but states they are very hard to get into. Denies fever, chill, purulent drainage.  Hx of HS and DM2. DM is controlled by diet.       ROS:  Review of Systems   Constitutional:  Negative for chills and fever.   HENT: Negative.     Eyes: Negative.    Respiratory:  Negative for cough and shortness of breath.    Cardiovascular:  Negative for chest pain.   Gastrointestinal: Negative.    Genitourinary: Negative.    Musculoskeletal: Negative.    Skin:  Positive for rash.   Neurological: Negative.    All other systems reviewed and are negative.       CURRENT MEDICATIONS:  Encounter Medications with Dispense Information[1]    ALLERGIES:   Allergies[2]    PROBLEM LIST:    does not have any pertinent problems on file.    Allergies, Medications, & Tobacco/Substance Use were reconciled by the Medical Assistant and reviewed by myself.     Objective:   /82   Pulse 68   Temp 36.4 °C (97.5 °F) (Temporal)   Resp 20   Ht 1.626 m (5' 4\")   Wt 116 kg (255 lb 6.4 oz)   LMP  (Exact Date)   SpO2 97%   BMI 43.84 kg/m²     Physical Exam  Vitals and nursing note reviewed.   Constitutional:       General: She is not in acute distress.     Appearance: Normal appearance. She is not ill-appearing.   HENT:      Head: Normocephalic and atraumatic.      Right Ear: External ear normal.      Left Ear: External ear normal.      Nose: Nose normal.   Eyes:      Extraocular Movements: Extraocular movements intact.      Conjunctiva/sclera: Conjunctivae normal.      Pupils: Pupils are equal, round, and reactive to light.   Cardiovascular:      Rate and Rhythm: Normal " rate.   Pulmonary:      Effort: Pulmonary effort is normal.   Musculoskeletal:         General: Normal range of motion.      Cervical back: Normal range of motion.   Skin:     General: Skin is warm and dry.      Findings: Erythema and rash (bilateral arm pits, see photos) present. No abrasion, abscess, bruising, burn, ecchymosis, lesion or wound.   Neurological:      General: No focal deficit present.      Mental Status: She is alert.           Assessment/Plan:   Pt's history and physical exam consistent with rash to bilateral armpit. Because she did not get any improvement with topical steroid and antibiotics, encouraged to try lotrimin and nystatin. Supportive care and return precautions discussed. She states understanding.     Assessment & Plan  Rash and nonspecific skin eruption  Orders:    nystatin (MYCOSTATIN) 731879 UNIT/GM Cream topical cream; Apply 1 g topically 2 times a day for 10 days.  - keep area clean and dry  - apply lotrimin daily  - avoid friction   - avoid application of any harsh soap, lotions, deodorants  - f/u with dermatology    Discussed differential diagnosis, management options, risks/benefits, and alternatives to planned treatment. Pt expressed understanding and the treatment plan was agreed upon. Questions were encouraged and answered. Pt encouraged to return to urgent care as needed if new or worsening symptoms or if there is no improvement in condition. Pt educated in red flags and indications to immediately call 911 or present to the Emergency Department. Advised the patient to follow-up with the primary care physician for recheck, reevaluation, and further management.    I personally reviewed prior external notes and test results pertinent to today's visit. I have independently reviewed and interpreted all diagnostics ordered during this visit.    Please note that this dictation was created using voice recognition software. I have made a reasonable attempt to correct obvious errors,  but I expect that there are errors of grammar and possibly content that I did not discover before finalizing the note.    This note was electronically signed by EDD Ayala         [1]   Current Outpatient Medications   Medication Sig Refill Last Dispense    acetaminophen (TYLENOL) 325 MG Tab Take 2 Tablets by mouth every 6 hours.  Unknown (no pharmacy)    albuterol 108 (90 Base) MCG/ACT Aero Soln inhalation aerosol Inhale 1-2 Puffs every four hours as needed for Shortness of Breath.  Unknown (patient-reported)    amLODIPine (NORVASC) 5 MG Tab Take 1 tablet by mouth every day. 3 Never dispensed    aspirin 81 MG EC tablet Take 1 Tablet by mouth every day. 3 Never dispensed    dicyclomine (BENTYL) 10 MG Cap Take 1 Capsule by mouth 2 times a day as needed for abdominal cramping/discomfort 1 Never dispensed    diphenhydrAMINE (BENADRYL) 25 MG Tab Take 50 mg by mouth at bedtime.  Unknown (patient-reported)    docusate sodium (COLACE) 250 MG capsule Take 250 mg by mouth 2 times a day.  Unknown (patient-reported)    Etonogestrel (NEXPLANON SC) Inject 1 Application under the skin one time.  Unknown (patient-reported)    FIBER PO Take 3 Tablets by mouth every day.  Unknown (patient-reported)    gabapentin (NEURONTIN) 400 MG Cap TAKE 3 CAPSULES BY MOUTH THREE TIMES DAILY.  FOR 30 DAY SUPPLY. DX: M79.7 0 Never dispensed    gabapentin (NEURONTIN) 400 MG Cap TAKE 3 CAPSULES BY MOUTH THREE TIMES DAILY. 0 Never dispensed    Galcanezumab-gnlm (EMGALITY) 120 MG/ML Solution Auto-injector Inject 1 mL subcutaneously every month. 11 5/9/2025    ipratropium-albuterol (COMBIVENT RESPIMAT)  MCG/ACT Aero Soln Inhale 1 Puff 4 times a day as needed (Shortness of breath).  Unknown (patient-reported)    isosorbide mononitrate SR (IMDUR) 60 MG TABLET SR 24 HR Take 1 Tablet by mouth every morning. 3 4/18/2025    ivabradine (CORLANOR) 7.5 MG Tab tablet Take 1 Tablet by mouth 2 times a day with meals. 3 5/14/2025    lamoTRIgine  (LAMICTAL) 25 MG Tab Take 2 Tablets by mouth 2 times a day. 1 2/4/2025    Melatonin 10 MG Tab Take 10 mg by mouth at bedtime.  Unknown (patient-reported)    methocarbamol (ROBAXIN) 500 MG Tab Take 1 Tablet by mouth 4 times a day. 1 Never dispensed    methocarbamol (ROBAXIN) 500 MG Tab Take 1 Tablet by mouth 4 times a day. 0 Never dispensed    methocarbamol (ROBAXIN) 500 MG Tab Take  1 tablet by mouth four times a day. 0 Never dispensed    methylPREDNISolone (MEDROL DOSEPAK) 4 MG Tablet Therapy Pack Follow schedule on package instructions. 0 Never dispensed    metoprolol SR (TOPROL XL) 50 MG TABLET SR 24 HR Take 1 Tablet by mouth 2 times a day. 3 Never dispensed    mupirocin (BACTROBAN) 2 % Ointment Apply 1 Application topically 2 times a day for 10 days. 0 5/21/2025    nitroglycerin (NITROLINGUAL) 0.4 MG/SPRAY Solution Place 1 Spray under the tongue as needed (chest pains). 11 Never dispensed    nitroglycerin (NITROSTAT) 0.4 MG SL Tab Place 1 Tablet under the tongue as needed for Chest Pain (may repeat for chest pain every 5 mins not to exceed 3 doses). 11 3/29/2025    nystatin (MYCOSTATIN) 326323 UNIT/GM Cream topical cream Apply 1 g topically 2 times a day for 10 days. 0 Never dispensed    omeprazole (PRILOSEC) 20 MG delayed-release capsule Take 2 Capsules by mouth 2 times a day. 3 2/4/2025    ondansetron (ZOFRAN ODT) 4 MG TABLET DISPERSIBLE Dissolve 1 Tablet by mouth every four hours as needed for Nausea/Vomiting (give PO if no IV route available). 0 4/15/2025    polyethylene glycol-electrolytes (GOLYTELY) 236 GM Recon Soln Follow [facility] instructions handout 0 5/12/2025    Ranolazine 1000 MG TABLET SR 12 HR Take 1 Tablet by mouth 2 times a day. 3 3/7/2025    Rimegepant Sulfate (NURTEC) 75 MG TABLET DISPERSIBLE Take 1 tablet by mouth at onset of migraine or aura okay to repeat in 24 hours if needed. 5 5/9/2025    scopolamine (TRANSDERM SCOP, 1.5 MG,) 1 mg/72hr PATCH 72 HR Place 1 Patch on the skin every 72  "hours. (Patient taking differently: Place 1 Patch on the skin every 72 hours as needed. Indications: Nausea and Vomiting) 1 10/10/2024    senna-docusate (PERICOLACE OR SENOKOT S) 8.6-50 MG Tab Take 1 Tablet by mouth every day. 0 4/15/2025    spironolactone (ALDACTONE) 50 MG Tab Take 1 Tablet by mouth every day. 3 4/23/2025    sumatriptan (IMITREX) 20 MG/ACT nasal spray Give 1 dose at onset headache okay to repeat in 2 hours if needed for max of 2 doses in a 24 hour timeframe. 5 5/9/2025    tizanidine (ZANAFLEX) 4 MG Tab Take 4 mg by mouth in the morning, at noon, and at bedtime.  Unknown (patient-reported)    topiramate (TOPAMAX) 50 MG tablet Take 1 Tablet by mouth 2 times a day. 4 5/9/2025    ziprasidone (GEODON) 40 MG Cap Take 1 capsule by mouth at bedtime. 1 5/1/2025   [2]   Allergies  Allergen Reactions    Cefdinir Shortness of Breath and Itching     Tolerated 1/18/17  Tolerates ceftriaxone  Tolerated augmentin 8/2019     Depakote [Divalproex Sodium] Unspecified     Muscle spasms/muscle pain and weakness      Doxycycline Anaphylaxis and Vomiting     Other reaction(s): pustules/blisters  Other reaction(s): stomach pain    Montelukast [Singulair] Unspecified     Cardiac effusion    Vancomycin Itching     Pt becomes flushed in face and chest.   RXN=7/10/16    Wound Dressing Adhesive Rash     By pt report-\"removes skin totally off\"    Amitriptyline Unspecified     Headaches      Amoxicillin Rash      Tolerates augmentin    Aripiprazole [Abilify] Unspecified     Headaches/muscle twitching      Clindamycin Nausea         Other reaction(s): nausea stomach pain    Erythromycin Rash     .  Other reaction(s): nausea stomach pain    Flomax [Tamsulosin Hydrochloride] Swelling    Hydromorphone      Other reaction(s): vomiting    Levaquin Unspecified     Severe muscle cramps in legs  RXN=unknown  Other reaction(s): leg muscle cramps    Metformin Unspecified     Increased lactic acid     Other reaction(s): itching and " "rash/nausea vomiting    Sulfa Drugs Hives, Itching, Myalgia and Unspecified     Muscle pain and weakness    Other reaction(s): unknown    Tamsulosin Swelling     Swelling of legs    Tape Rash     Tears skin off  coban with Tegaderm tape ok intermittently  RXN=ongoing    Sulfamethoxazole W-Trimethoprim Rash    Ciprofloxacin Rash          Keflex Rash     Pt states she gets a rash with this medication  Tolerates ceftriaxone  Can take with Benadryl    Levofloxacin Unspecified     Leg muscle cramps    Metronidazole Rash     \"Vision problems\"  Other reaction(s): vision problems     "

## 2025-06-01 PROCEDURE — RXMED WILLOW AMBULATORY MEDICATION CHARGE: Performed by: NURSE PRACTITIONER

## 2025-06-01 PROCEDURE — RXMED WILLOW AMBULATORY MEDICATION CHARGE

## 2025-06-01 PROCEDURE — RXMED WILLOW AMBULATORY MEDICATION CHARGE: Performed by: STUDENT IN AN ORGANIZED HEALTH CARE EDUCATION/TRAINING PROGRAM

## 2025-06-02 ENCOUNTER — PHARMACY VISIT (OUTPATIENT)
Dept: PHARMACY | Facility: MEDICAL CENTER | Age: 35
End: 2025-06-02
Payer: COMMERCIAL

## 2025-06-02 ENCOUNTER — TELEPHONE (OUTPATIENT)
Dept: CARDIOLOGY | Facility: MEDICAL CENTER | Age: 35
End: 2025-06-02
Payer: MEDICARE

## 2025-06-02 ENCOUNTER — OFFICE VISIT (OUTPATIENT)
Dept: CARDIOLOGY | Facility: MEDICAL CENTER | Age: 35
End: 2025-06-02
Payer: MEDICARE

## 2025-06-02 VITALS
SYSTOLIC BLOOD PRESSURE: 102 MMHG | HEIGHT: 64 IN | RESPIRATION RATE: 14 BRPM | BODY MASS INDEX: 43.19 KG/M2 | OXYGEN SATURATION: 96 % | WEIGHT: 253 LBS | DIASTOLIC BLOOD PRESSURE: 64 MMHG | HEART RATE: 64 BPM

## 2025-06-02 DIAGNOSIS — I20.89 MICROVASCULAR ANGINA (HCC): ICD-10-CM

## 2025-06-02 DIAGNOSIS — I47.11 INAPPROPRIATE SINUS TACHYCARDIA (HCC): Primary | ICD-10-CM

## 2025-06-02 DIAGNOSIS — I25.85 CHRONIC CORONARY MICROVASCULAR DYSFUNCTION: ICD-10-CM

## 2025-06-02 DIAGNOSIS — G90.A POTS (POSTURAL ORTHOSTATIC TACHYCARDIA SYNDROME): ICD-10-CM

## 2025-06-02 PROCEDURE — 3078F DIAST BP <80 MM HG: CPT

## 2025-06-02 PROCEDURE — 3074F SYST BP LT 130 MM HG: CPT

## 2025-06-02 PROCEDURE — RXMED WILLOW AMBULATORY MEDICATION CHARGE

## 2025-06-02 PROCEDURE — 99213 OFFICE O/P EST LOW 20 MIN: CPT

## 2025-06-02 PROCEDURE — 99214 OFFICE O/P EST MOD 30 MIN: CPT

## 2025-06-02 RX ORDER — ONABOTULINUMTOXINA 200 [USP'U]/1
INJECTION, POWDER, LYOPHILIZED, FOR SOLUTION INTRADERMAL; INTRAMUSCULAR
COMMUNITY
Start: 2025-04-29

## 2025-06-02 RX ORDER — CARVEDILOL 12.5 MG/1
12.5 TABLET ORAL 2 TIMES DAILY WITH MEALS
Qty: 60 TABLET | Refills: 11 | Status: SHIPPED | OUTPATIENT
Start: 2025-06-02 | End: 2025-06-05

## 2025-06-02 ASSESSMENT — ENCOUNTER SYMPTOMS
LIGHT-HEADEDNESS: 0
SHORTNESS OF BREATH: 0
PND: 0
HEADACHES: 0
NEAR-SYNCOPE: 1
ORTHOPNEA: 0
DIZZINESS: 0
PALPITATIONS: 0
SYNCOPE: 0
DYSPNEA ON EXERTION: 0

## 2025-06-02 ASSESSMENT — FIBROSIS 4 INDEX: FIB4 SCORE: 0.44

## 2025-06-02 NOTE — Clinical Note
Hi,  Patient was prescribed nitro spray from  but she says its 200 dollars a month and cannot afford it. Are you able to see if there is any special pricing.

## 2025-06-02 NOTE — TELEPHONE ENCOUNTER
----- Message from Nurse Practitioner VANIA Thurman sent at 6/2/2025  9:03 AM PDT -----  Hi,    Patient was prescribed nitro spray from  but she says its 200 dollars a month and cannot afford it. Are you able to see if there is any special pricing.

## 2025-06-02 NOTE — TELEPHONE ENCOUNTER
Mauri Davidson,    This patient was prescribed Nitroglycerin spray on 04/17/25 from . She has 2 insurance coverages, [employer] ([employer]) and [employer].     Her primary coverage through [employer] / [employer] requires a prior authorization and will not be covered by her secondary medicaid if her primary does not cover the medication.     Can you please submit a PA for this patient's Nitroglycerin 0.4mg/Spray for 12g / 30 days?    Her pharmacy insurance billing info is as followed:    BIN: [id number]  PCN: [id number]  GROUP: [id number]  ID: [id number]    Thank you!    Karla HELTON  Rx Coordinator

## 2025-06-02 NOTE — PROGRESS NOTES
Chief Complaint   Patient presents with    Follow-Up     FV DX:Inappropriate sinus tachycardia          Subjective:   Kristin Balderrama is a 33 y.o. female who presents today for follow-up.     Patient of Dr. Smith.  Current medical problems include sinus tachycardia, ablation 2016 for sinus node modification for IST (Inappropriate Sinus Tachycardia), IST ablation 2016, POTS on chronic Corlanor and beta-blocker therapy, hypermobility syndrome, TONYA, IIH (idiopathic intracranial hypertension), NPH normal pressure hydrocephalus, optic neuropathy, chronic pain syndrome, bladder incontinence, S/P sacral stimulator, cervical neuralgia with leg weakness, nephrolithiasis S/P kidney stone extraction stent placement 2/14/2024, hip fracture 1/27/2023 H/O psychiatric issues. Their last clinic visit was 5/8/2025 with myself.    Interval history:  At that last follow up patient has had multiple ED visit for continued chest pain. During her Ed workup her troponin and cardiac markers are negative. A stress test was ordered due to risk and continued chest pain. It did show apical wall, apical septum ischemia, summed stress difference score is 6.     Patient presented to the ED on 2/15/2025 for chest pain. After consultation with cardiology she did agree to move forward with cardiac catheterization. Patient underwent LHC with Dr. Bryant on 2/17/2025 which did not show any obstructive CAD. Recommendation for an outpatient cardiac CT for assessment of microvascular disease.    Patient has had two more ED visit for chest pain. Troponin negative and EKG negative for any ischemic changes.     Today's visit:  Since last follow up she had gone to the ED twice related to chest pain associated with near syncope. She said she will be sitting in a chair and get chest pain and feel near syncopal. She will take a nitroglycerin which does help and take the pain away but the nitroglycerin will wear off and she will feel the same again. She does  "say she has been having more difficult patients at work and having to exert herself more which does increase her symptoms. She is working with PT for her foot and able to do all the exercises. She denies orthopnea, PND, edema, or syncope.      Cardiovascular Risk Factors:  1. Smoking status: Never  2. Type II Diabetes Mellitus: No   Lab Results   Component Value Date/Time    HBA1C 5.8 02/18/2025 05:02 PM    HBA1C 6.0 (A) 09/23/2024 12:11 PM     3. Hypertension: Yes  4. Dyslipidemia: No   Cholesterol,Tot   Date Value Ref Range Status   01/20/2021 172 100 - 199 mg/dL Final     LDL   Date Value Ref Range Status   01/20/2021 98 <100 mg/dL Final     HDL   Date Value Ref Range Status   01/20/2021 48 >=40 mg/dL Final     Triglycerides   Date Value Ref Range Status   01/20/2021 130 0 - 149 mg/dL Final       Past Medical History:   Diagnosis Date    Abdominal pain     Anginal syndrome     Random chest pain especially with tachycardia    Apnea, sleep     Arthritis     ASTHMA     when around smoke    Back pain     Borderline personality disorder (HCC)     Chickenpox     Chronic UTI 09/18/2010    Daytime sleepiness     Dental disorder 03/08/2021    Infected tooth    Depression     Diabetes (HCC)     Diarrhea     Incontinece    Disorder of thyroid     Hashimoto's    Fatigue     Frequent headaches     Heart burn     History of falling     Inappropriate sinus tachycardia (HCC) 2016    Statusp post ablation by Dr. Kumar.    Migraine     Mitochondrial disease (HCC)     Multiple personality disorder (HCC)     Obesity     Pain     Back, 7/10    Painful joint     PCOS (polycystic ovarian syndrome)     Pneumonia 2012 12/2020    POTS (postural orthostatic tachycardia syndrome)     Psychosis (HCC)     Ringing in ears     Scoliosis     Shortness of breath     O2 as needed    Sinus tachycardia 10/31/2013    Sleep apnea     CPAP \"pulmonary doctor took me off mid year 2016\"    Snoring     Supraventricular tachycardia (HCC) 04/10/2019    " "Transverse myelitis (HCC)     2/8/22: Per pt: not anymore    Tuberculosis     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    Urinary bladder disorder     Suprapubic cath. 2/8/22: Not anymore.     Urinary incontinence     Weakness     Wears glasses          Family History   Problem Relation Age of Onset    Hypertension Mother     Sleep Apnea Mother     Heart Disease Mother     Other Mother         hypothryod    No Known Problems Sister     Other Sister         Narcolepsy;fibromyalsia;bone;nerve    Genitourinary () Problems Sister         endometriosis    Hypertension Maternal Uncle     Heart Disease Maternal Grandmother     Hypertension Maternal Grandmother     Cancer Neg Hx          Social History     Tobacco Use    Smoking status: Never     Passive exposure: Never    Smokeless tobacco: Never   Vaping Use    Vaping status: Never Used   Substance Use Topics    Alcohol use: No     Alcohol/week: 0.0 oz    Drug use: Never         Allergies   Allergen Reactions    Cefdinir Shortness of Breath and Itching     Tolerated 1/18/17  Tolerates ceftriaxone  Tolerated augmentin 8/2019     Depakote [Divalproex Sodium] Unspecified     Muscle spasms/muscle pain and weakness      Doxycycline Anaphylaxis and Vomiting     Other reaction(s): pustules/blisters  Other reaction(s): stomach pain    Montelukast [Singulair] Unspecified     Cardiac effusion    Vancomycin Itching     Pt becomes flushed in face and chest.   RXN=7/10/16    Wound Dressing Adhesive Rash     By pt report-\"removes skin totally off\"    Amitriptyline Unspecified     Headaches      Amoxicillin Rash      Tolerates augmentin    Aripiprazole [Abilify] Unspecified     Headaches/muscle twitching      Clindamycin Nausea         Other reaction(s): nausea stomach pain    Erythromycin Rash     .  Other reaction(s): nausea stomach pain    Flomax [Tamsulosin Hydrochloride] Swelling    Hydromorphone      Other reaction(s): vomiting    Levaquin Unspecified     Severe muscle cramps " "in legs  RXN=unknown  Other reaction(s): leg muscle cramps    Metformin Unspecified     Increased lactic acid     Other reaction(s): itching and rash/nausea vomiting    Sulfa Drugs Hives, Itching, Myalgia and Unspecified     Muscle pain and weakness    Other reaction(s): unknown    Tamsulosin Swelling     Swelling of legs    Tape Rash     Tears skin off  coban with Tegaderm tape ok intermittently  RXN=ongoing    Sulfamethoxazole W-Trimethoprim Rash    Ciprofloxacin Rash          Keflex Rash     Pt states she gets a rash with this medication  Tolerates ceftriaxone  Can take with Benadryl    Levofloxacin Unspecified     Leg muscle cramps    Metronidazole Rash     \"Vision problems\"  Other reaction(s): vision problems         Current Outpatient Medications   Medication Sig    BOTOX 200 units Recon Soln injection give 155 units IM per the FDA approved paradigm for treatment of chronic migraine q 12 weeks    carvedilol (COREG) 12.5 MG Tab Take 1 Tablet by mouth 2 times a day with meals.    methylPREDNISolone (MEDROL DOSEPAK) 4 MG Tablet Therapy Pack Follow schedule on package instructions.    nystatin (MYCOSTATIN) 637122 UNIT/GM Cream topical cream Apply 1 g topically 2 times a day for 10 days.    polyethylene glycol-electrolytes (GOLYTELY) 236 GM Recon Soln Follow [facility] instructions handout    gabapentin (NEURONTIN) 400 MG Cap TAKE 3 CAPSULES BY MOUTH THREE TIMES DAILY.  FOR 30 DAY SUPPLY. DX: M79.7    isosorbide mononitrate SR (IMDUR) 60 MG TABLET SR 24 HR Take 1 Tablet by mouth every morning.    ivabradine (CORLANOR) 7.5 MG Tab tablet Take 1 Tablet by mouth 2 times a day with meals.    amLODIPine (NORVASC) 5 MG Tab Take 1 tablet by mouth every day.    methocarbamol (ROBAXIN) 500 MG Tab Take 1 Tablet by mouth 4 times a day.    ondansetron (ZOFRAN ODT) 4 MG TABLET DISPERSIBLE Dissolve 1 Tablet by mouth every four hours as needed for Nausea/Vomiting (give PO if no IV route available).    Etonogestrel " (NEXPLANON SC) Inject 1 Application under the skin one time.    FIBER PO Take 3 Tablets by mouth every day.    ipratropium-albuterol (COMBIVENT RESPIMAT)  MCG/ACT Aero Soln Inhale 1 Puff 4 times a day as needed (Shortness of breath).    dicyclomine (BENTYL) 10 MG Cap Take 1 Capsule by mouth 2 times a day as needed for abdominal cramping/discomfort    Ranolazine 1000 MG TABLET SR 12 HR Take 1 Tablet by mouth 2 times a day.    aspirin 81 MG EC tablet Take 1 Tablet by mouth every day.    docusate sodium (COLACE) 250 MG capsule Take 250 mg by mouth 2 times a day.    nitroglycerin (NITROSTAT) 0.4 MG SL Tab Place 1 Tablet under the tongue as needed for Chest Pain (may repeat for chest pain every 5 mins not to exceed 3 doses).    spironolactone (ALDACTONE) 50 MG Tab Take 1 Tablet by mouth every day.    omeprazole (PRILOSEC) 20 MG delayed-release capsule Take 2 Capsules by mouth 2 times a day.    topiramate (TOPAMAX) 50 MG tablet Take 1 Tablet by mouth 2 times a day.    scopolamine (TRANSDERM SCOP, 1.5 MG,) 1 mg/72hr PATCH 72 HR Place 1 Patch on the skin every 72 hours.    ziprasidone (GEODON) 40 MG Cap Take 1 capsule by mouth at bedtime.    Galcanezumab-gnlm (EMGALITY) 120 MG/ML Solution Auto-injector Inject 1 mL subcutaneously every month.    lamoTRIgine (LAMICTAL) 25 MG Tab Take 2 Tablets by mouth 2 times a day.    Rimegepant Sulfate (NURTEC) 75 MG TABLET DISPERSIBLE Take 1 tablet by mouth at onset of migraine or aura okay to repeat in 24 hours if needed.    sumatriptan (IMITREX) 20 MG/ACT nasal spray Give 1 dose at onset headache okay to repeat in 2 hours if needed for max of 2 doses in a 24 hour timeframe.    diphenhydrAMINE (BENADRYL) 25 MG Tab Take 50 mg by mouth at bedtime.    Melatonin 10 MG Tab Take 10 mg by mouth at bedtime.    gabapentin (NEURONTIN) 400 MG Cap TAKE 3 CAPSULES BY MOUTH THREE TIMES DAILY. (Patient not taking: Reported on 6/2/2025)    nitroglycerin (NITROLINGUAL) 0.4 MG/SPRAY Solution Place 1  "Spray under the tongue as needed (chest pains). (Patient not taking: Reported on 6/2/2025)    albuterol 108 (90 Base) MCG/ACT Aero Soln inhalation aerosol Inhale 1-2 Puffs every four hours as needed for Shortness of Breath. (Patient not taking: Reported on 6/2/2025)         Review of Systems   Constitutional: Positive for malaise/fatigue.   Cardiovascular:  Positive for chest pain and near-syncope. Negative for dyspnea on exertion, leg swelling, orthopnea, palpitations, paroxysmal nocturnal dyspnea and syncope.   Respiratory:  Negative for shortness of breath.    Neurological:  Negative for dizziness, headaches and light-headedness.           Objective:   /64 (BP Location: Left arm, Patient Position: Sitting, BP Cuff Size: Adult)   Pulse 64   Resp 14   Ht 1.626 m (5' 4\")   Wt 115 kg (253 lb)   SpO2 96%  Body mass index is 43.43 kg/m².         Physical Exam  Vitals reviewed.   Constitutional:       General: She is not in acute distress.     Appearance: She is obese.   HENT:      Head: Normocephalic and atraumatic.   Cardiovascular:      Rate and Rhythm: Normal rate and regular rhythm.      Pulses: Normal pulses.      Heart sounds: No murmur heard.  Pulmonary:      Effort: Pulmonary effort is normal. No respiratory distress.      Breath sounds: Normal breath sounds.   Musculoskeletal:      Right lower leg: No edema.      Left lower leg: No edema.   Neurological:      Mental Status: She is alert and oriented to person, place, and time.      Gait: Gait normal.   Psychiatric:         Behavior: Behavior normal.             Lab Results   Component Value Date/Time    SODIUM 141 05/25/2025 01:34 AM    POTASSIUM 3.9 05/25/2025 01:34 AM    CHLORIDE 112 05/25/2025 01:34 AM    CO2 17 (L) 05/25/2025 01:34 AM    GLUCOSE 102 (H) 05/25/2025 01:34 AM    BUN 17 05/25/2025 01:34 AM    CREATININE 0.96 05/25/2025 01:34 AM    CREATININE 0.75 (L) 07/20/2010 11:00 AM    BUNCREATRAT 20.0 01/22/2025 05:10 PM    BUNCREATRAT 19 " 07/20/2010 11:00 AM    GLOMRATE >59 07/20/2010 11:00 AM      Lab Results   Component Value Date/Time    WBC 7.1 05/25/2025 12:13 AM    WBC 6.1 07/20/2010 11:00 AM    RBC 4.43 05/25/2025 12:13 AM    RBC 4.38 07/20/2010 11:00 AM    HEMOGLOBIN 14.2 05/25/2025 12:13 AM    HEMATOCRIT 42.6 05/25/2025 12:13 AM    MCV 96.2 05/25/2025 12:13 AM    MCV 93 07/20/2010 11:00 AM    MCH 32.1 05/25/2025 12:13 AM    MCH 30.1 07/20/2010 11:00 AM    MCHC 33.3 05/25/2025 12:13 AM    MPV 11.6 05/25/2025 12:13 AM    NEUTSPOLYS 57.80 05/25/2025 12:13 AM    LYMPHOCYTES 30.20 05/25/2025 12:13 AM    MONOCYTES 9.70 05/25/2025 12:13 AM    EOSINOPHILS 1.40 05/25/2025 12:13 AM    BASOPHILS 0.60 05/25/2025 12:13 AM    ANISOCYTOSIS 1+ 10/05/2024 04:35 AM      Lab Results   Component Value Date/Time    CHOLSTRLTOT 198 09/30/2024 07:14 AM     (H) 09/30/2024 07:14 AM    HDL 44 09/30/2024 07:14 AM    TRIGLYCERIDE 175 (H) 09/30/2024 07:14 AM       Lab Results   Component Value Date/Time    TROPONINT 7 07/17/2024 1913     Lab Results   Component Value Date/Time    NTPROBNP 70 07/17/2024 1913     Coronary Angiogram 2/17/2025  HEMODYNAMICS:   Aortic pressure: 112/79 mmHg  Pre-A wave pressure: 9 mmHg  No significant aortic gradient on pullback     CORONARY ANGIOGRAPHY:  The left main coronary artery is patent and bifurcates into the left anterior descending and left circumflex coronaries.  The left anterior descending coronary artery is a large, transapical vessel that supplies a moderate first diagonal branch and a small second diagonal branch and has no angiographically significant disease.  The left circumflex coronary artery is a large, nondominant vessel that supplies a small first obtuse marginal branch, a large and bifurcating second obtuse marginal branch, and a small third obtuse marginal branch and has no angiographically significant disease.  The right coronary artery is a large, dominant vessel that supplies a large posterior descending  coronary and a large posterolateral branch and has no angiographically significant disease.     IMPRESSION:  Angiographically normal coronary arteries.  Normal left heart filling pressures.     RECOMMENDATIONS:  Return to floor with continued care per primary service.  TR band release per protocol.  Continue evaluation for chronic chest pain symptoms not due to obstructive epicardial coronary artery disease.      Cardiac PET 3/12/2025   PET IMAGING INTERPRETATION      Large defect of mildly to moderately reduced uptake in the apex, apical    anterior, apical septal, apical inferior, mid anterior, mid anteroseptal, mid      inferoseptal, mid inferior segments which is more present in stress than rest      images suggestive of large scar and mild ischemia in the apical and mid    inferior segments.   Normal left ventricular wall motion.  LV ejection fraction = 73%.      FLOW RESERVE INTERPRETATION      Reduced coronary flow at rest with normal coronary flow reserve  Assessment:   1. Chronic coronary microvascular dysfunction  - REFERRAL TO CARDIOLOGY    2. Microvascular angina (HCC)  - carvedilol (COREG) 12.5 MG Tab; Take 1 Tablet by mouth 2 times a day with meals.  Dispense: 60 Tablet; Refill: 11  - REFERRAL TO CARDIOLOGY    3. Inappropriate sinus tachycardia (HCC)  - REFERRAL TO CARDIOLOGY    4. POTS (postural orthostatic tachycardia syndrome)  - REFERRAL TO CARDIOLOGY    Other orders  - BOTOX 200 units Recon Soln injection; give 155 units IM per the FDA approved paradigm for treatment of chronic migraine q 12 weeks                  Medical Decision Making:  Today's Assessment / Plan:   Chest pain  Chronic coronary microvascular dysfunction  -increased chest pain with near syncope which does improve with nitroglycerin  -Continue Ranolazine 1000 mg twice a day  -stop metoprolol and start carvedilol 12.5 mg twice a day to see if improves symptoms with additional vasodilator support, can increase titration pending  response to initial dose  -Continue imdur 60 mg daily, can discuss increasing dose pending carvedilol adjustment  -Nitroglycerine 0.4 mg as needed for chest pain not to exceed three doses  -referral to [facility] for further microvascular workup and functional testing.    POTS  Inappropriate sinus tachycardia  -Continue to monitor salt through diet  -continue ivabradine 7.5 mg twice a day  -Continue spironolactone 50 mg daily   -stop metoprolol and start carvedilol 12.5 mg twice a day to see if improves symptoms with additional vasodilator support,  -Blood pressure well controlled, continue to monitor at home    No follow-ups on file.  Sooner if problems.    PER Flores.

## 2025-06-03 NOTE — TELEPHONE ENCOUNTER
Called the number provided and provided information needed to Yohana from [facility] verbally.

## 2025-06-03 NOTE — TELEPHONE ENCOUNTER
[facility]    Caller: [facility]    Topic/issue: [place] reached out regarding the prior auth and needs an ICD 10 code.     Ref. [id number]    Please advise.    Callback Number: [phone]     Thank you,     Alicia MORA

## 2025-06-04 ENCOUNTER — APPOINTMENT (OUTPATIENT)
Dept: PHYSICAL THERAPY | Facility: REHABILITATION | Age: 35
End: 2025-06-04
Attending: STUDENT IN AN ORGANIZED HEALTH CARE EDUCATION/TRAINING PROGRAM
Payer: MEDICARE

## 2025-06-05 ENCOUNTER — PATIENT MESSAGE (OUTPATIENT)
Dept: CARDIOLOGY | Facility: MEDICAL CENTER | Age: 35
End: 2025-06-05
Payer: MEDICARE

## 2025-06-05 ENCOUNTER — OFFICE VISIT (OUTPATIENT)
Dept: URGENT CARE | Facility: CLINIC | Age: 35
End: 2025-06-05
Payer: MEDICARE

## 2025-06-05 VITALS
BODY MASS INDEX: 43.19 KG/M2 | HEART RATE: 85 BPM | WEIGHT: 253 LBS | RESPIRATION RATE: 16 BRPM | DIASTOLIC BLOOD PRESSURE: 80 MMHG | SYSTOLIC BLOOD PRESSURE: 128 MMHG | HEIGHT: 64 IN | OXYGEN SATURATION: 97 % | TEMPERATURE: 97.4 F

## 2025-06-05 DIAGNOSIS — I20.89 MICROVASCULAR ANGINA (HCC): ICD-10-CM

## 2025-06-05 DIAGNOSIS — H60.501 ACUTE OTITIS EXTERNA OF RIGHT EAR, UNSPECIFIED TYPE: Primary | ICD-10-CM

## 2025-06-05 DIAGNOSIS — I25.85 CHRONIC CORONARY MICROVASCULAR DYSFUNCTION: Primary | ICD-10-CM

## 2025-06-05 DIAGNOSIS — G90.A POTS (POSTURAL ORTHOSTATIC TACHYCARDIA SYNDROME): ICD-10-CM

## 2025-06-05 DIAGNOSIS — I47.11 INAPPROPRIATE SINUS TACHYCARDIA (HCC): ICD-10-CM

## 2025-06-05 DIAGNOSIS — I47.10 SVT (SUPRAVENTRICULAR TACHYCARDIA) (HCC): ICD-10-CM

## 2025-06-05 PROCEDURE — RXMED WILLOW AMBULATORY MEDICATION CHARGE

## 2025-06-05 PROCEDURE — 99213 OFFICE O/P EST LOW 20 MIN: CPT

## 2025-06-05 PROCEDURE — 3079F DIAST BP 80-89 MM HG: CPT

## 2025-06-05 PROCEDURE — 3074F SYST BP LT 130 MM HG: CPT

## 2025-06-05 RX ORDER — CIPROFLOXACIN AND DEXAMETHASONE 3; 1 MG/ML; MG/ML
4 SUSPENSION/ DROPS AURICULAR (OTIC) 2 TIMES DAILY
Qty: 7.5 ML | Refills: 0 | Status: SHIPPED | OUTPATIENT
Start: 2025-06-05 | End: 2025-06-24

## 2025-06-05 RX ORDER — CARVEDILOL 25 MG/1
25 TABLET ORAL 2 TIMES DAILY WITH MEALS
Qty: 180 TABLET | Refills: 3 | Status: SHIPPED | OUTPATIENT
Start: 2025-06-05 | End: 2025-06-21

## 2025-06-05 ASSESSMENT — FIBROSIS 4 INDEX: FIB4 SCORE: 0.44

## 2025-06-05 ASSESSMENT — ENCOUNTER SYMPTOMS
FEVER: 0
CHILLS: 0

## 2025-06-05 NOTE — PROGRESS NOTES
CHIEF COMPLAINT  Chief Complaint   Patient presents with    Foreign Body in Ear     X today/ R ear/ bubbling sound      Subjective:   Kristin Balderrama is a 34 y.o. female who presents to urgent care with concerns for right-sided ear pain and fullness x 1 day.  She also reports associated symptoms of bubbling sound.  Denies any drainage.  No fevers or chills.  No cough or congestion.      Review of Systems   Constitutional:  Negative for chills and fever.   HENT:  Positive for ear pain. Negative for ear discharge, hearing loss and tinnitus.        PAST MEDICAL HISTORY  Patient Active Problem List    Diagnosis Date Noted    Low back pain 04/10/2025    Hypertension 04/10/2025    Multiple drug allergies 03/17/2025    Chest pain 02/16/2025    Acute chest pain 02/15/2025    Symptom involving bladder 12/05/2024    Abdominal pain 10/03/2024    Morbid obesity (HCC) 09/23/2024    Kidney stone 03/29/2024    SVT (supraventricular tachycardia) ([facility]) 11/14/2023    PCOS (polycystic ovarian syndrome) 11/14/2023    Inguinal hernia 11/14/2023    Myopia of both eyes 08/29/2023    Cervical radiculopathy 08/17/2023    POTS (postural orthostatic tachycardia syndrome) 01/28/2023    Moderate persistent asthma without complication 01/27/2023    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    Hypermobility syndrome 11/10/2022    History of nephrolithiasis 10/31/2022    Chronic obstructive pulmonary disease (HCC) 10/13/2022    Right inguinal hernia 10/09/2022    Migraine, hemiplegic 07/27/2022    Hidradenitis axillaris 07/10/2022    Vision changes 06/28/2022    Inappropriate sinus tachycardia (HCC) 09/24/2021    Normal pressure hydrocephalus (HCC) 06/04/2021    Hemorrhoid 05/24/2021    Bilateral hand pain 04/05/2021    Prolonged Q-T interval on ECG 08/27/2020    Transverse myelitis (HCC) 08/15/2020    Chronic restrictive lung disease 04/20/2020    Schizoaffective disorder, depressive type (HCC) 03/02/2020    IIH (idiopathic intracranial  hypertension) 02/27/2020    Mixed stress and urge urinary incontinence 12/27/2019    Immunocompromised (HCC) 11/06/2019    Moderate persistent asthma with acute exacerbation 08/14/2019    Optic neuritis 05/27/2019    Transaminitis 08/08/2018    TONYA (obstructive sleep apnea) 01/09/2018    Vitamin D deficiency 05/21/2016    Severe obesity (BMI >= 40) (HCC) 03/07/2016    Scoliosis 03/07/2016    GERD (gastroesophageal reflux disease) 03/07/2016    OAB (overactive bladder) 02/08/2016    Fatty liver disease, nonalcoholic 01/19/2015    Dissociative identity disorder (HCC) 04/02/2011       SURGICAL HISTORY   has a past surgical history that includes neuro dest facet l/s w/ig sngl (04/21/2015); recovery (01/27/2016); [place] by laparoscopy (08/29/2010); lumbar fusion anterior (08/21/2012); other cardiac surgery (01/2016); tonsillectomy; bowel stimulator insertion (07/13/2016); gastroscopy with balloon dilatation (N/A, 01/04/2017); muscle biopsy (Right, 01/26/2017); other abdominal surgery; laminotomy; bowel stimulator insertion (03/10/2021); lap,diagnostic abdomen (02/14/2022); ovarian cystectomy (Right, 02/14/2022); percut fix prox/neck femur fx (Left, 01/28/2023); laparoscopic inguinal hernia repair (Right, 07/21/2023); hernia repair (Right, 07/21/2023); cystoscopy,insert ureteral stent (Right, 2/12/2024); cysto/uretero/pyeloscopy, dx (Right, 2/12/2024); and lasertripsy (Right, 2/12/2024).    ALLERGIES  Allergies[1]    CURRENT MEDICATIONS  Home Medications       Reviewed by Ayan Milian Ass't (Medical Assistant) on 06/05/25 at 1516  Med List Status: <None>     Medication Last Dose Status   albuterol 108 (90 Base) MCG/ACT Aero Soln inhalation aerosol Not Taking Active   amLODIPine (NORVASC) 5 MG Tab Taking Active   aspirin 81 MG EC tablet Taking Active   BOTOX 200 units Recon Soln injection Taking Active   carvedilol (COREG) 25 MG Tab Taking Active   dicyclomine (BENTYL) 10 MG Cap Taking Active    diphenhydrAMINE (BENADRYL) 25 MG Tab Taking Active   docusate sodium (COLACE) 250 MG capsule Taking Active   Etonogestrel (NEXPLANON SC) Taking Active   FIBER PO Taking Active   gabapentin (NEURONTIN) 400 MG Cap Taking Active   gabapentin (NEURONTIN) 400 MG Cap Not Taking Active   Galcanezumab-gnlm (EMGALITY) 120 MG/ML Solution Auto-injector Taking Active   ipratropium-albuterol (COMBIVENT RESPIMAT)  MCG/ACT Aero Soln Taking Active   isosorbide mononitrate SR (IMDUR) 60 MG TABLET SR 24 HR Taking Active   ivabradine (CORLANOR) 7.5 MG Tab tablet Taking Active   lamoTRIgine (LAMICTAL) 25 MG Tab Taking Active   Melatonin 10 MG Tab Taking Active   methocarbamol (ROBAXIN) 500 MG Tab Taking Active   methylPREDNISolone (MEDROL DOSEPAK) 4 MG Tablet Therapy Pack Taking Active   nitroglycerin (NITROLINGUAL) 0.4 MG/SPRAY Solution Not Taking Active   nitroglycerin (NITROSTAT) 0.4 MG SL Tab Taking Active   nystatin (MYCOSTATIN) 827490 UNIT/GM Cream topical cream Taking Active   omeprazole (PRILOSEC) 20 MG delayed-release capsule Taking Active   ondansetron (ZOFRAN ODT) 4 MG TABLET DISPERSIBLE Taking Active   polyethylene glycol-electrolytes (GOLYTELY) 236 GM Recon Soln Taking Active   Ranolazine 1000 MG TABLET SR 12 HR Taking Active   Rimegepant Sulfate (NURTEC) 75 MG TABLET DISPERSIBLE Taking Active   scopolamine (TRANSDERM SCOP, 1.5 MG,) 1 mg/72hr PATCH 72 HR Taking Active   spironolactone (ALDACTONE) 50 MG Tab Taking Active   sumatriptan (IMITREX) 20 MG/ACT nasal spray Taking Active   topiramate (TOPAMAX) 50 MG tablet Taking Active   ziprasidone (GEODON) 40 MG Cap Taking Active                    SOCIAL HISTORY  Social History     Tobacco Use    Smoking status: Never     Passive exposure: Never    Smokeless tobacco: Never   Vaping Use    Vaping status: Never Used   Substance and Sexual Activity    Alcohol use: No     Alcohol/week: 0.0 oz    Drug use: Never    Sexual activity: Not Currently     Birth control/protection:  "Implant       FAMILY HISTORY  Family History   Problem Relation Age of Onset    Hypertension Mother     Sleep Apnea Mother     Heart Disease Mother     Other Mother         hypothryod    No Known Problems Sister     Other Sister         Narcolepsy;fibromyalsia;bone;nerve    Genitourinary () Problems Sister         endometriosis    Hypertension Maternal Uncle     Heart Disease Maternal Grandmother     Hypertension Maternal Grandmother     Cancer Neg Hx          Medications, Allergies, and current problem list reviewed today in Epic.     Objective:     /80   Pulse 85   Temp 36.3 °C (97.4 °F) (Temporal)   Resp 16   Ht 1.626 m (5' 4\")   Wt 115 kg (253 lb)   SpO2 97%     Physical Exam  Vitals reviewed.   Constitutional:       General: She is not in acute distress.     Appearance: Normal appearance. She is not toxic-appearing.   HENT:      Head: Normocephalic.      Right Ear: Tympanic membrane normal. Swelling and tenderness present. No drainage. There is no impacted cerumen.      Left Ear: Tympanic membrane normal. No drainage or swelling. There is no impacted cerumen.      Nose: No congestion.      Mouth/Throat:      Mouth: Mucous membranes are moist.      Pharynx: Oropharynx is clear.   Cardiovascular:      Rate and Rhythm: Normal rate.   Pulmonary:      Effort: Pulmonary effort is normal.   Musculoskeletal:      Cervical back: Neck supple.   Neurological:      General: No focal deficit present.      Mental Status: She is alert.         Assessment/Plan:     Diagnosis and associated orders:     1. Acute otitis externa of right ear, unspecified type  ciprofloxacin/dexamethasone (CIPRODEX) 0.3-0.1 % Suspension         Comments/MDM:     History and physical exam consistent with otitis externa.  Discussed likely etiology with patient today in clinic.  Bilateral TMs are intact.  Prescription for Ciprodex prescribed preferred pharmacy as patient does report improvement in the past with this regimen.  May " "continue with Tylenol and Motrin for alleviation of acute discomfort.  Advised to avoid submerging head underwater as well as to avoid use of earbuds.  Follow-up with ENT  Return to clinic if symptoms worsen or fail to resolve.  Patient comfortable with plan.         Differential diagnosis, natural history, supportive care, and indications for immediate follow-up discussed.    Advised the patient to follow-up with the primary care physician for recheck, reevaluation, and consideration of further management.    Please note that this dictation was created using voice recognition software. I have made a reasonable attempt to correct obvious errors, but I expect that there are errors of grammar and possibly content that I did not discover before finalizing the note.    This note was electronically signed by VANIA Owen       [1]   Allergies  Allergen Reactions    Cefdinir Shortness of Breath and Itching     Tolerated 1/18/17  Tolerates ceftriaxone  Tolerated augmentin 8/2019     Depakote [Divalproex Sodium] Unspecified     Muscle spasms/muscle pain and weakness      Doxycycline Anaphylaxis and Vomiting     Other reaction(s): pustules/blisters  Other reaction(s): stomach pain    Montelukast [Singulair] Unspecified     Cardiac effusion    Vancomycin Itching     Pt becomes flushed in face and chest.   RXN=7/10/16    Wound Dressing Adhesive Rash     By pt report-\"removes skin totally off\"    Amitriptyline Unspecified     Headaches      Amoxicillin Rash      Tolerates augmentin    Aripiprazole [Abilify] Unspecified     Headaches/muscle twitching      Clindamycin Nausea         Other reaction(s): nausea stomach pain    Erythromycin Rash     .  Other reaction(s): nausea stomach pain    Flomax [Tamsulosin Hydrochloride] Swelling    Hydromorphone      Other reaction(s): vomiting    Levaquin Unspecified     Severe muscle cramps in legs  RXN=unknown  Other reaction(s): leg muscle cramps    Metformin Unspecified " "    Increased lactic acid     Other reaction(s): itching and rash/nausea vomiting    Sulfa Drugs Hives, Itching, Myalgia and Unspecified     Muscle pain and weakness    Other reaction(s): unknown    Tamsulosin Swelling     Swelling of legs    Tape Rash     Tears skin off  coban with Tegaderm tape ok intermittently  RXN=ongoing    Sulfamethoxazole W-Trimethoprim Rash    Ciprofloxacin Rash          Keflex Rash     Pt states she gets a rash with this medication  Tolerates ceftriaxone  Can take with Benadryl    Levofloxacin Unspecified     Leg muscle cramps    Metronidazole Rash     \"Vision problems\"  Other reaction(s): vision problems     "

## 2025-06-05 NOTE — PROGRESS NOTES
Called patient to discuss medication and symptoms. Increased carvedilol to 25 mg twice a day. If patient cannot tolerate increased dose due to blood pressure we can go to 18.75 mg twice a day. Patient verbalized understanding of plan of care and knows to follow up if have any further concerns or questions.

## 2025-06-06 NOTE — Clinical Note
REFERRAL APPROVAL NOTICE         Sent on June 6, 2025                   Kristin Balderrama  [address]  [place] [address]                   Dear Ms. Balderrama,    After a careful review of the medical information and benefit coverage, [facility] has processed your referral. See below for additional details.    If applicable, you must be actively enrolled with your insurance for coverage of the authorized service. If you have any questions regarding your coverage, please contact your insurance directly.    REFERRAL INFORMATION   Referral #:  [id number]  Referred-To Provider    Referred-By Provider:  Cardiovascular Disease (Cardiology)    VANIA Flores   [facility]      [address]  [address]  [place] [address]  [phone] [address]  [facility] [address]  [phone]    Referral Start Date:  06/02/2025  Referral End Date:   06/02/2026             SCHEDULING  If you do not already have an appointment, please call [phone] to make an appointment.     MORE INFORMATION  If you do not already have a [employer] account, sign up at: [email or web address]  You can access your medical information, make appointments, see lab results, billing information, and more.  If you have questions regarding this referral, please contact  the [facility] Referrals department at:             [phone]. Monday - Friday 8:00AM - 5:00PM.     Sincerely,    [facility]

## 2025-06-10 ENCOUNTER — OFFICE VISIT (OUTPATIENT)
Dept: NEUROLOGY | Facility: MEDICAL CENTER | Age: 35
End: 2025-06-10
Attending: STUDENT IN AN ORGANIZED HEALTH CARE EDUCATION/TRAINING PROGRAM
Payer: MEDICARE

## 2025-06-10 ENCOUNTER — PHARMACY VISIT (OUTPATIENT)
Dept: PHARMACY | Facility: MEDICAL CENTER | Age: 35
End: 2025-06-10
Payer: COMMERCIAL

## 2025-06-10 VITALS
HEIGHT: 64 IN | DIASTOLIC BLOOD PRESSURE: 68 MMHG | TEMPERATURE: 97.1 F | HEART RATE: 60 BPM | SYSTOLIC BLOOD PRESSURE: 116 MMHG | BODY MASS INDEX: 43.4 KG/M2 | WEIGHT: 254.19 LBS | OXYGEN SATURATION: 97 %

## 2025-06-10 DIAGNOSIS — G90.9 AUTONOMIC DYSFUNCTION: Primary | ICD-10-CM

## 2025-06-10 DIAGNOSIS — K31.84 GASTROPARESIS: ICD-10-CM

## 2025-06-10 PROCEDURE — 99214 OFFICE O/P EST MOD 30 MIN: CPT

## 2025-06-10 PROCEDURE — RXMED WILLOW AMBULATORY MEDICATION CHARGE: Performed by: NURSE PRACTITIONER

## 2025-06-10 PROCEDURE — 99215 OFFICE O/P EST HI 40 MIN: CPT | Performed by: STUDENT IN AN ORGANIZED HEALTH CARE EDUCATION/TRAINING PROGRAM

## 2025-06-10 PROCEDURE — 3074F SYST BP LT 130 MM HG: CPT | Performed by: STUDENT IN AN ORGANIZED HEALTH CARE EDUCATION/TRAINING PROGRAM

## 2025-06-10 PROCEDURE — G2212 PROLONG OUTPT/OFFICE VIS: HCPCS | Performed by: STUDENT IN AN ORGANIZED HEALTH CARE EDUCATION/TRAINING PROGRAM

## 2025-06-10 PROCEDURE — 3078F DIAST BP <80 MM HG: CPT | Performed by: STUDENT IN AN ORGANIZED HEALTH CARE EDUCATION/TRAINING PROGRAM

## 2025-06-10 RX ORDER — PYRIDOSTIGMINE BROMIDE 60 MG/1
60 TABLET ORAL 3 TIMES DAILY
Qty: 90 TABLET | Refills: 3 | Status: SHIPPED | OUTPATIENT
Start: 2025-06-10

## 2025-06-10 ASSESSMENT — PATIENT HEALTH QUESTIONNAIRE - PHQ9: CLINICAL INTERPRETATION OF PHQ2 SCORE: 0

## 2025-06-10 ASSESSMENT — FIBROSIS 4 INDEX: FIB4 SCORE: 0.44

## 2025-06-10 NOTE — PROGRESS NOTES
"[facility] NEUROLOGY    Date of Service: 06/10/25    Referred by: RHIANNON Beard M.D.  [address] [place] [address],  [place] [address]      Reason for the referral/Chief Complaint:  Evaluation for autonomic dysfunction    History of present illness (HPI)   Kristin Balderrama is a 34 y.o. woman with history of possible conversion disorder due to multiple episodes of neurologic dysfunction with negative workup, L4-L5 fusion, Annetta-Danlos, gastroparesis, bladder dysfunction status post surgery, migraine headaches, schizoaffective disorder, and inappropriate sinus tachycardia status post ablation.  She was previously following with Dr. Beard.  She was initially referred by her PCP due to balance issues resulting in falls and weak .  Per Dr. Beard's note, she has had an extensive history of neurological complaints including visual loss and paralysis with multiple admissions and negative workup.  Per chart review, the workup has included MRI brain, lumbar puncture, full spine MRI, paraneoplastic panel, Mogg antibody and aquaporin antibody, muscle biopsy, EMG/NCS.    Today, patient mentioned that her cardiologist would like a neurological evaluation regarding her autonomic dysfunction.  Will focus the visit on this.  She reports that she has had problems regulating the temperature, postural orthostatic syndrome, chest pain with palpitations, hypersensitivity to hot and cold, gastroparesis and bloating for several years.  She believes that this might have started during childhood but has gotten worse during the years.  She also reports paresthesias described as tingling on her thighs.  This is intermittent.  Sometimes she feels that the legs are \"slow to respond\".    Her gastroparesis was being managed by Dr. Reinier Preston, surgeon at [facility].  She had Botox and gastroscopy with balloon dilation without significant success.    Review of Systems (ROS)  ROS    Medical history  Past Medical History[1]    Surgical history  Past " Surgical History[2]    Relevant family history  None    Social history  Caregiver     Tobacco: No  Alcohol: No     Medications  Active Medications[3]    Physical exam    Vitals:    06/10/25 0819   BP: 116/68   Pulse: 60   Temp: 36.2 °C (97.1 °F)   SpO2: 97%       General: Appears comfortable     Neurologic exam:    Mental Status: Alert & oriented to person, place, time, and situation. Memory and recall are grossly intact. Language testing shows normal naming, repetition, fluency, and comprehension.    Cranial Nerves: Pupils are equal and reactive to light. Eye movements are normal. No nystagmus noted. The funduscopic exam was deferred. Facial activation is symmetric and full. Hearing is intact to conversation. Shoulder shrug is 5/5 power bilaterally. Tongue is midline.    Neuromuscular exam:     Tone: Normal  Fasciculations: None  Atrophy: None  Deformity: None  Rising from chair: Stands from the seated position independently without arm support    Formal muscle testing (MRC grade):    Upper extremities                                                                           Right  Left    Deltoid  5 5   Infraspinatus  5 5   Biceps  5 5   Triceps  5 5                  FDP 2,3 5 5   FDP 4,5 5 5             FDI 5 5   APB 5 5   ADM 5 5               Lower extremities    Right  Left    Iliopsoas  5 5   Quadriceps 5 5   Hamstrings 5 5   Tibialis Anterior 5 5   Gastrocnemius 5 5             EHL 5 5               Reflexes   Right Left   Pectoralis Absent Absent   Biceps + +   Triceps + +   Brachioradialis + +   Noland Absent Absent   Patellar + +   Ankle + +   Clonus Absent Absent   Plantar  Downgoing Downgoing     Sensory:  Vibration: Normal and symmetrical in great toes and thumbs   Joint Position: Normal in great toes  Pinprick: Blunted at the toes, otherwise normal.  Temperature: Normal and symmetrical in 4 extremities     Coordination: Finger-to-nose movements are smooth, without ataxia or dysmetria.    Gait:  Atypical gait.  Wide based gait, fluctuating weakness in hip flexors.    Relevant laboratory results:  TSH 01/2025  3.1    B12 02/2025 433    Relevant Imaging results:   MRI brain with and without contrast performed September 2023, MRI thoracic spine performed January 2024, MRI lumbar and cervical spine performed January 2024 where unremarkable.  Only reports available.    Tilt table test      EMG/NCS 10/2024 of bilateral upper extremities reviewed - normal     Assessment and Plan:     34-year-old patient with extensive medical history.  She has had multiple episodes of neurological complaints with multiple hospitalizations and negative workup as described in H&P.  She presents today to evaluate the possibility of her autonomic dysfunction being caused by a neurological problem.  She may have small fiber neuropathy contributing to her significant gastroparesis, abnormal temperature regulation, dizziness, palpitations, and generalized paresthesias.  I will schedule a skin biopsy.  Her migraine headaches are being managed by [facility].  She is taking Nurtec, Emgality, lamotrigine, Topamax and getting Botox.  For her severe gastroparesis, she will try Mestinon 60 mg 3 times a day as tolerated.    1. Autonomic dysfunction  - Biopsy of Skin Lesion; Future    2. Gastroparesis  - pyridostigmine (MESTINON) 60 MG Tab; Take 1 Tablet by mouth 3 times a day.  Dispense: 90 Tablet; Refill: 3       Return to the clinic in 4 months, before that I will perform skin biopsy.      I spent a total of 60 minutes on this patient's care on the day of their visit excluding time spent related to any billed procedures. This time includes time spent with the patient as well as time spent documenting in the medical record, reviewing patient's records and tests, obtaining history, placing orders, communicating with other healthcare professionals, counseling the patient, family, or caregiver, and/or care coordination for the diagnoses above  "         Debby Lance MD  Neurology  Clinical Neurophysiology   [facility]          [1]   Past Medical History:  Diagnosis Date    Abdominal pain     Anginal syndrome     Random chest pain especially with tachycardia    Apnea, sleep     Arthritis     ASTHMA     when around smoke    Back pain     Borderline personality disorder (HCC)     Chickenpox     Chronic UTI 09/18/2010    Daytime sleepiness     Dental disorder 03/08/2021    Infected tooth    Depression     Diabetes (HCC)     Diarrhea     Incontinece    Disorder of thyroid     Hashimoto's    Fatigue     Frequent headaches     Heart burn     History of falling     Inappropriate sinus tachycardia (HCC) 2016    Statusp post ablation by Dr. Kumar.    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    Urinary bladder disorder     Suprapubic cath. 2/8/22: Not anymore.     Urinary incontinence     Weakness     Wears glasses    [2]   Past Surgical History:  Procedure Laterality Date    VA CYSTOSCOPY,INSERT URETERAL STENT Right 2/12/2024    Procedure: CYSTOSCOPY, WITH RIGHT URETEROSCOPY, WITH LITHOTRIPSY, WITH INSERTION OF RIGHT URETERAL STENT;  Surgeon: Josafat Roberson M.D.;  Location: [facility];  Service: Urology    VA CYSTO/URETERO/PYELOSCOPY, DX Right 2/12/2024    Procedure: URETEROSCOPY;  Surgeon: Josafat Roberson M.D.;  Location: [facility];  " Service: Urology    LASERTRIPSY Right 2/12/2024    Procedure: LITHOTRIPSY, USING LASER;  Surgeon: Josafat Roberson M.D.;  Location: [facility];  Service: Urology    LAPAROSCOPIC INGUINAL HERNIA REPAIR Right 07/21/2023    Procedure: LAPAROSCOPIC RIGHT INGUINAL HERNIA REPAIR WITH MESH;  Surgeon: Joe Noyola M.D.;  Location: [facility];  Service: General    HERNIA REPAIR Right 07/21/2023    PB PERCUT FIX PBOX/NECK FEMUR FX Left 01/28/2023    Procedure: FIXATION, HIP, USING CANNULATED SCREW;  Surgeon: Noman Abdul M.D.;  Location: [facility];  Service: Orthopedics    WV LAP,DIAGNOSTIC ABDOMEN  02/14/2022    Procedure: LAPAROSCOPY;  Surgeon: Seamus Pisano M.D.;  Location: SURGERY SAME DAY [facility];  Service: Gynecology    OVARIAN CYSTECTOMY Right 02/14/2022    Procedure: EXCISION, CYST, OVARY;  Surgeon: Seamus Pisano M.D.;  Location: SURGERY SAME DAY [facility];  Service: Gynecology    BOWEL STIMULATOR INSERTION  03/10/2021    Procedure: INSERTION, ELECTRODE LEADS AND PULSE GENERATOR, NEUROSTIMULATOR, SACRAL - REMOVAL OF INTERSTIM WITH REPLACEMENT OF SACRAL NEUROMODULATION DEVICE;  Surgeon: Joe Noyola M.D.;  Location: [facility];  Service: General    MUSCLE BIOPSY Right 01/26/2017    Procedure: MUSCLE BIOPSY - THIGH;  Surgeon: Isidro Vigil M.D.;  Location: [facility];  Service:     GASTROSCOPY WITH BALLOON DILATATION N/A 01/04/2017    Procedure: GASTROSCOPY WITH DILATATION;  Surgeon: Torres Vargas M.D.;  Location: [facility];  Service:     BOWEL STIMULATOR INSERTION  07/13/2016    Procedure: BOWEL STIMULATOR INSERTION FOR PERMANENT INTERSTIM SACRAL IMPLANT;  Surgeon: Joe Noyola M.D.;  Location: [facility];  Service:     RECOVERY  01/27/2016    Procedure: CATH LAB EP STUDY WITH SINUS NODE MODIFICATION LA;  Surgeon: [facility];  [place] [facility]  Service:     OTHER CARDIAC SURGERY  01/2016     cardiac ablation    NEURO DEST FACET L/S W/IG SNGL  04/21/2015    Performed by Reza Tabor at SURGERY [facility]    LUMBAR FUSION ANTERIOR  08/21/2012    Performed by SUSIE SAWANT at SURGERY [facility] ORS    ALYSSA BY LAPAROSCOPY  08/29/2010    Performed by SHAYY JOHNS at SURGERY [facility] ORS    LAMINOTOMY      OTHER ABDOMINAL SURGERY      TONSILLECTOMY      tonsillectomy   [3]   Outpatient Medications Marked as Taking for the 6/10/25 encounter (Office Visit) with Debby Wolf M.D.   Medication Sig Dispense Refill    pyridostigmine (MESTINON) 60 MG Tab Take 1 Tablet by mouth 3 times a day. 90 Tablet 3    carvedilol (COREG) 25 MG Tab Take 1 Tablet by mouth 2 times a day with meals. 180 Tablet 3    ciprofloxacin/dexamethasone (CIPRODEX) 0.3-0.1 % Suspension Administer 4 Drops into the right ear 2 times a day for 7 days. 7.5 mL 0    BOTOX 200 units Recon Soln injection give 155 units IM per the FDA approved paradigm for treatment of chronic migraine q 12 weeks      methylPREDNISolone (MEDROL DOSEPAK) 4 MG Tablet Therapy Pack Follow schedule on package instructions. 21 Tablet 0    nystatin (MYCOSTATIN) 275767 UNIT/GM Cream topical cream Apply 1 g topically 2 times a day for 10 days. 30 g 0    polyethylene glycol-electrolytes (GOLYTELY) 236 GM Recon Soln Follow [facility] instructions handout 4000 mL 0    gabapentin (NEURONTIN) 400 MG Cap TAKE 3 CAPSULES BY MOUTH THREE TIMES DAILY.  FOR 30 DAY SUPPLY. DX: M79.7 270 Capsule 0    isosorbide mononitrate SR (IMDUR) 60 MG TABLET SR 24 HR Take 1 Tablet by mouth every morning. 90 Tablet 3    ivabradine (CORLANOR) 7.5 MG Tab tablet Take 1 Tablet by mouth 2 times a day with meals. 180 Tablet 3    amLODIPine (NORVASC) 5 MG Tab Take 1 tablet by mouth every day. 90 Tablet 3    methocarbamol (ROBAXIN) 500 MG Tab Take 1 Tablet by mouth 4 times a day. 120 Tablet 1    ondansetron (ZOFRAN ODT) 4 MG TABLET DISPERSIBLE Dissolve 1 Tablet by  mouth every four hours as needed for Nausea/Vomiting (give PO if no IV route available). 10 Tablet 0    Etonogestrel (NEXPLANON SC) Inject 1 Application under the skin one time.      FIBER PO Take 3 Tablets by mouth every day.      ipratropium-albuterol (COMBIVENT RESPIMAT)  MCG/ACT Aero Soln Inhale 1 Puff 4 times a day as needed (Shortness of breath).      dicyclomine (BENTYL) 10 MG Cap Take 1 Capsule by mouth 2 times a day as needed for abdominal cramping/discomfort 60 Capsule 1    Ranolazine 1000 MG TABLET SR 12 HR Take 1 Tablet by mouth 2 times a day. 180 Tablet 3    aspirin 81 MG EC tablet Take 1 Tablet by mouth every day. 100 Tablet 3    docusate sodium (COLACE) 250 MG capsule Take 250 mg by mouth 2 times a day.      nitroglycerin (NITROSTAT) 0.4 MG SL Tab Place 1 Tablet under the tongue as needed for Chest Pain (may repeat for chest pain every 5 mins not to exceed 3 doses). 25 Tablet 11    spironolactone (ALDACTONE) 50 MG Tab Take 1 Tablet by mouth every day. 90 Tablet 3    omeprazole (PRILOSEC) 20 MG delayed-release capsule Take 2 Capsules by mouth 2 times a day. 180 Capsule 3    topiramate (TOPAMAX) 50 MG tablet Take 1 Tablet by mouth 2 times a day. 180 Tablet 4    scopolamine (TRANSDERM SCOP, 1.5 MG,) 1 mg/72hr PATCH 72 HR Place 1 Patch on the skin every 72 hours. 7 Patch 1    ziprasidone (GEODON) 40 MG Cap Take 1 capsule by mouth at bedtime. 90 Capsule 1    Galcanezumab-gnlm (EMGALITY) 120 MG/ML Solution Auto-injector Inject 1 mL subcutaneously every month. 1 mL 11    lamoTRIgine (LAMICTAL) 25 MG Tab Take 2 Tablets by mouth 2 times a day. 360 Tablet 1    Rimegepant Sulfate (NURTEC) 75 MG TABLET DISPERSIBLE Take 1 tablet by mouth at onset of migraine or aura okay to repeat in 24 hours if needed. 8 Tablet 5    sumatriptan (IMITREX) 20 MG/ACT nasal spray Give 1 dose at onset headache okay to repeat in 2 hours if needed for max of 2 doses in a 24 hour timeframe. 6 Each 5    diphenhydrAMINE (BENADRYL) 25  MG Tab Take 50 mg by mouth at bedtime.      Melatonin 10 MG Tab Take 10 mg by mouth at bedtime.

## 2025-06-11 ENCOUNTER — APPOINTMENT (OUTPATIENT)
Dept: PHYSICAL THERAPY | Facility: REHABILITATION | Age: 35
End: 2025-06-11
Attending: STUDENT IN AN ORGANIZED HEALTH CARE EDUCATION/TRAINING PROGRAM
Payer: MEDICARE

## 2025-06-12 ENCOUNTER — PATIENT MESSAGE (OUTPATIENT)
Dept: CARDIOLOGY | Facility: MEDICAL CENTER | Age: 35
End: 2025-06-12
Payer: MEDICARE

## 2025-06-12 ENCOUNTER — TELEPHONE (OUTPATIENT)
Dept: PHYSICAL THERAPY | Facility: REHABILITATION | Age: 35
End: 2025-06-12
Payer: MEDICARE

## 2025-06-12 PROCEDURE — RXMED WILLOW AMBULATORY MEDICATION CHARGE: Performed by: INTERNAL MEDICINE

## 2025-06-12 NOTE — OP THERAPY DISCHARGE SUMMARY
Outpatient Physical Therapy  DISCHARGE SUMMARY NOTE      [address]  [address]  [place] [address]  Phone:  [phone]  Fax:  [phone]    Date of Visit: 06/12/2025    Patient: Kristin Balderrama  YOB: 1990  MRN: [id number]     Referring Provider: Fly Goodson M.D.    Referring Diagnosis foot pain, bilateral M79.671, M79.672          Functional Assessment Used        Your patient is being discharged from Physical Therapy with the following comments:   Goals partially met    Spoke with patient and she stated that her orthopedist is concerned that  hardware in her hip may be backing out and to avoid any excessive activity.  Patient agreed to stop PT at this time until her hips can be cleared.  Discharging pt. From therapy at this time    Pineda Thrasher, PT, DPT, OCS    Date: 6/12/2025

## 2025-06-13 ENCOUNTER — PHARMACY VISIT (OUTPATIENT)
Dept: PHARMACY | Facility: MEDICAL CENTER | Age: 35
End: 2025-06-13
Payer: COMMERCIAL

## 2025-06-13 ENCOUNTER — APPOINTMENT (OUTPATIENT)
Dept: PHYSICAL THERAPY | Facility: REHABILITATION | Age: 35
End: 2025-06-13
Attending: STUDENT IN AN ORGANIZED HEALTH CARE EDUCATION/TRAINING PROGRAM
Payer: MEDICARE

## 2025-06-17 ENCOUNTER — APPOINTMENT (OUTPATIENT)
Dept: PHYSICAL THERAPY | Facility: REHABILITATION | Age: 35
End: 2025-06-17
Attending: STUDENT IN AN ORGANIZED HEALTH CARE EDUCATION/TRAINING PROGRAM
Payer: MEDICARE

## 2025-06-19 ENCOUNTER — APPOINTMENT (OUTPATIENT)
Dept: PHYSICAL THERAPY | Facility: REHABILITATION | Age: 35
End: 2025-06-19
Attending: STUDENT IN AN ORGANIZED HEALTH CARE EDUCATION/TRAINING PROGRAM
Payer: MEDICARE

## 2025-06-19 ENCOUNTER — PATIENT MESSAGE (OUTPATIENT)
Dept: CARDIOLOGY | Facility: MEDICAL CENTER | Age: 35
End: 2025-06-19
Payer: MEDICARE

## 2025-06-21 ENCOUNTER — APPOINTMENT (OUTPATIENT)
Dept: RADIOLOGY | Facility: MEDICAL CENTER | Age: 35
End: 2025-06-21
Attending: EMERGENCY MEDICINE
Payer: MEDICARE

## 2025-06-21 ENCOUNTER — HOSPITAL ENCOUNTER (EMERGENCY)
Facility: MEDICAL CENTER | Age: 35
End: 2025-06-21
Attending: EMERGENCY MEDICINE
Payer: MEDICARE

## 2025-06-21 VITALS
HEIGHT: 64 IN | BODY MASS INDEX: 43.19 KG/M2 | TEMPERATURE: 96.7 F | WEIGHT: 253 LBS | HEART RATE: 59 BPM | DIASTOLIC BLOOD PRESSURE: 57 MMHG | OXYGEN SATURATION: 98 % | RESPIRATION RATE: 18 BRPM | SYSTOLIC BLOOD PRESSURE: 120 MMHG

## 2025-06-21 DIAGNOSIS — I25.85 CHRONIC CORONARY MICROVASCULAR DYSFUNCTION: ICD-10-CM

## 2025-06-21 DIAGNOSIS — R42 LIGHTHEADEDNESS: Primary | ICD-10-CM

## 2025-06-21 DIAGNOSIS — I20.89 MICROVASCULAR ANGINA (HCC): ICD-10-CM

## 2025-06-21 DIAGNOSIS — I47.11 INAPPROPRIATE SINUS TACHYCARDIA (HCC): ICD-10-CM

## 2025-06-21 DIAGNOSIS — I47.10 SVT (SUPRAVENTRICULAR TACHYCARDIA) (HCC): ICD-10-CM

## 2025-06-21 DIAGNOSIS — G90.A POTS (POSTURAL ORTHOSTATIC TACHYCARDIA SYNDROME): ICD-10-CM

## 2025-06-21 LAB
ALBUMIN SERPL BCP-MCNC: 4.2 G/DL (ref 3.2–4.9)
ALBUMIN/GLOB SERPL: 1.9 G/DL
ALP SERPL-CCNC: 57 U/L (ref 30–99)
ALT SERPL-CCNC: 21 U/L (ref 2–50)
ANION GAP SERPL CALC-SCNC: 12 MMOL/L (ref 7–16)
AST SERPL-CCNC: 12 U/L (ref 12–45)
BASOPHILS # BLD AUTO: 0.8 % (ref 0–1.8)
BASOPHILS # BLD: 0.05 K/UL (ref 0–0.12)
BILIRUB SERPL-MCNC: 0.4 MG/DL (ref 0.1–1.5)
BUN SERPL-MCNC: 12 MG/DL (ref 8–22)
CALCIUM ALBUM COR SERPL-MCNC: 8.8 MG/DL (ref 8.5–10.5)
CALCIUM SERPL-MCNC: 9 MG/DL (ref 8.5–10.5)
CHLORIDE SERPL-SCNC: 112 MMOL/L (ref 96–112)
CO2 SERPL-SCNC: 15 MMOL/L (ref 20–33)
CREAT SERPL-MCNC: 0.89 MG/DL (ref 0.5–1.4)
D DIMER PPP IA.FEU-MCNC: <0.27 UG/ML (FEU) (ref 0–0.5)
EKG IMPRESSION: NORMAL
EOSINOPHIL # BLD AUTO: 0.08 K/UL (ref 0–0.51)
EOSINOPHIL NFR BLD: 1.2 % (ref 0–6.9)
ERYTHROCYTE [DISTWIDTH] IN BLOOD BY AUTOMATED COUNT: 42.6 FL (ref 35.9–50)
GFR SERPLBLD CREATININE-BSD FMLA CKD-EPI: 86 ML/MIN/1.73 M 2
GLOBULIN SER CALC-MCNC: 2.2 G/DL (ref 1.9–3.5)
GLUCOSE SERPL-MCNC: 126 MG/DL (ref 65–99)
HCT VFR BLD AUTO: 39.3 % (ref 37–47)
HGB BLD-MCNC: 13.9 G/DL (ref 12–16)
IMM GRANULOCYTES # BLD AUTO: 0.03 K/UL (ref 0–0.11)
IMM GRANULOCYTES NFR BLD AUTO: 0.5 % (ref 0–0.9)
LYMPHOCYTES # BLD AUTO: 1.21 K/UL (ref 1–4.8)
LYMPHOCYTES NFR BLD: 18.7 % (ref 22–41)
MCH RBC QN AUTO: 32.7 PG (ref 27–33)
MCHC RBC AUTO-ENTMCNC: 35.4 G/DL (ref 32.2–35.5)
MCV RBC AUTO: 92.5 FL (ref 81.4–97.8)
MONOCYTES # BLD AUTO: 0.5 K/UL (ref 0–0.85)
MONOCYTES NFR BLD AUTO: 7.7 % (ref 0–13.4)
NEUTROPHILS # BLD AUTO: 4.59 K/UL (ref 1.82–7.42)
NEUTROPHILS NFR BLD: 71.1 % (ref 44–72)
NRBC # BLD AUTO: 0 K/UL
NRBC BLD-RTO: 0 /100 WBC (ref 0–0.2)
NT-PROBNP SERPL IA-MCNC: 81 PG/ML (ref 0–125)
PLATELET # BLD AUTO: 151 K/UL (ref 164–446)
PMV BLD AUTO: 11.4 FL (ref 9–12.9)
POTASSIUM SERPL-SCNC: 3.8 MMOL/L (ref 3.6–5.5)
PROT SERPL-MCNC: 6.4 G/DL (ref 6–8.2)
RBC # BLD AUTO: 4.25 M/UL (ref 4.2–5.4)
SODIUM SERPL-SCNC: 139 MMOL/L (ref 135–145)
TROPONIN T SERPL-MCNC: <6 NG/L (ref 6–19)
WBC # BLD AUTO: 6.5 K/UL (ref 4.8–10.8)

## 2025-06-21 PROCEDURE — 71045 X-RAY EXAM CHEST 1 VIEW: CPT

## 2025-06-21 PROCEDURE — 85025 COMPLETE CBC W/AUTO DIFF WBC: CPT

## 2025-06-21 PROCEDURE — 85379 FIBRIN DEGRADATION QUANT: CPT

## 2025-06-21 PROCEDURE — 700105 HCHG RX REV CODE 258: Mod: UD | Performed by: EMERGENCY MEDICINE

## 2025-06-21 PROCEDURE — 36415 COLL VENOUS BLD VENIPUNCTURE: CPT

## 2025-06-21 PROCEDURE — 84484 ASSAY OF TROPONIN QUANT: CPT

## 2025-06-21 PROCEDURE — 93005 ELECTROCARDIOGRAM TRACING: CPT | Mod: TC | Performed by: EMERGENCY MEDICINE

## 2025-06-21 PROCEDURE — 93005 ELECTROCARDIOGRAM TRACING: CPT | Mod: TC

## 2025-06-21 PROCEDURE — 80053 COMPREHEN METABOLIC PANEL: CPT

## 2025-06-21 PROCEDURE — 99284 EMERGENCY DEPT VISIT MOD MDM: CPT

## 2025-06-21 PROCEDURE — 83880 ASSAY OF NATRIURETIC PEPTIDE: CPT

## 2025-06-21 RX ORDER — CARVEDILOL 12.5 MG/1
12.5 TABLET ORAL 2 TIMES DAILY WITH MEALS
Qty: 60 TABLET | Refills: 0 | Status: SHIPPED | OUTPATIENT
Start: 2025-06-21

## 2025-06-21 RX ORDER — SODIUM CHLORIDE 9 MG/ML
1000 INJECTION, SOLUTION INTRAVENOUS ONCE
Status: COMPLETED | OUTPATIENT
Start: 2025-06-21 | End: 2025-06-21

## 2025-06-21 RX ADMIN — SODIUM CHLORIDE 1000 ML: 9 INJECTION, SOLUTION INTRAVENOUS at 07:14

## 2025-06-21 ASSESSMENT — PAIN DESCRIPTION - PAIN TYPE
TYPE: ACUTE PAIN
TYPE: ACUTE PAIN

## 2025-06-21 ASSESSMENT — FIBROSIS 4 INDEX: FIB4 SCORE: 0.44

## 2025-06-21 NOTE — ED NOTES
Bedside report with SONYA Ugarte.  Pt resting in [facility] with unlabored breathing, connected to monitor.  Pt on room air. Fluid bolus running.  Pt updated on POC and verbalized understanding. Call light within reach.

## 2025-06-21 NOTE — ED PROVIDER NOTES
ED Provider Note    CHIEF COMPLAINT  Chief Complaint   Patient presents with    Chest Pain     L sided chest pain x 1 week    Shortness of Breath     SOB x 3 days, worse when lying down and walking.     Dizziness     Light headedness x 1 week       EXTERNAL RECORDS REVIEWED  Outpatient Notes notes from cardiology, patient with chronic coronary microvascular dysfunction, for which she is on carvedilol and ranolazine, Imdur and nitroglycerin, she also has POTS syndrome    HPI/ROS      Kristin Balderrama is a 34 y.o. female who presents chest pain.  Patient has a longstanding history of chronic chest pain and POTS.  Patient is followed by cardiology for this.  Patient was recently started on carvedilol.  Since being started on carvedilol patient reports that her dizziness episodes of worsened and her heart rate is dropping into the high 40s.  She showed me her watch and her heart rate ranges from a yosvany of 48 to a max from 105 throughout the day.  Generally her bradycardia is present in the mornings.  Patient denies incisional chest pain.  She reports feeling very cold, and short of breath.  She denies significant decreased exertional tolerance, and reports that the shortness of breath is worse upon standing.  She reports worsening dizziness.  She states it feels similar to her prior POTS attacks.  She has longstanding chest pain, this is a chronic problem for patient and she has known microvascular disease but has not been taking her nitroglycerin as she is concerned it will make her dizziness worse.    PAST MEDICAL HISTORY   has a past medical history of Abdominal pain, Anginal syndrome, Apnea, sleep, Arthritis, ASTHMA, Back pain, Borderline personality disorder (Formerly [facility]), Chickenpox, Chronic UTI (09/18/2010), Daytime sleepiness, Dental disorder (03/08/2021), Depression, Diabetes (Formerly [facility]), Diarrhea, Disorder of thyroid, Fatigue, Frequent headaches, Heart burn, History of falling, Inappropriate sinus tachycardia (Formerly [facility])  (2016), Migraine, Mitochondrial disease (HCC), Multiple personality disorder (HCC), Obesity, Pain, Painful joint, PCOS (polycystic ovarian syndrome), Pneumonia (2012 12/2020), POTS (postural orthostatic tachycardia syndrome), Psychosis (HCC), Ringing in ears, Scoliosis, Shortness of breath, Sinus tachycardia (10/31/2013), Sleep apnea, Snoring, Supraventricular tachycardia (HCC) (04/10/2019), Transverse myelitis (HCC), Tuberculosis, Urinary bladder disorder, Urinary incontinence, Weakness, and Wears glasses.    SURGICAL HISTORY   has a past surgical history that includes neuro dest facet l/s w/ig sngl (04/21/2015); recovery (01/27/2016); delmar by laparoscopy (08/29/2010); lumbar fusion anterior (08/21/2012); other cardiac surgery (01/2016); tonsillectomy; bowel stimulator insertion (07/13/2016); gastroscopy with balloon dilatation (N/A, 01/04/2017); muscle biopsy (Right, 01/26/2017); other abdominal surgery; laminotomy; bowel stimulator insertion (03/10/2021); lap,diagnostic abdomen (02/14/2022); ovarian cystectomy (Right, 02/14/2022); percut fix prox/neck femur fx (Left, 01/28/2023); laparoscopic inguinal hernia repair (Right, 07/21/2023); hernia repair (Right, 07/21/2023); cystoscopy,insert ureteral stent (Right, 2/12/2024); cysto/uretero/pyeloscopy, dx (Right, 2/12/2024); and lasertripsy (Right, 2/12/2024).    FAMILY HISTORY  Family History   Problem Relation Age of Onset    Hypertension Mother     Sleep Apnea Mother     Heart Disease Mother     Other Mother         hypothryod    No Known Problems Sister     Other Sister         Narcolepsy;fibromyalsia;bone;nerve    Genitourinary () Problems Sister         endometriosis    Hypertension Maternal Uncle     Heart Disease Maternal Grandmother     Hypertension Maternal Grandmother     Cancer Neg Hx        SOCIAL HISTORY  Social History     Tobacco Use    Smoking status: Never     Passive exposure: Never    Smokeless tobacco: Never   Vaping Use    Vaping status: Never  "Used   Substance and Sexual Activity    Alcohol use: No     Alcohol/week: 0.0 oz    Drug use: Never    Sexual activity: Not Currently     Birth control/protection: Implant       CURRENT MEDICATIONS  Home Medications    **Home medications have not yet been reviewed for this encounter**         ALLERGIES  Allergies[1]    PHYSICAL EXAM  VITAL SIGNS: BP (!) 140/89   Pulse 71   Temp 35.9 °C (96.7 °F) (Temporal)   Resp 16   Ht 1.626 m (5' 4\")   Wt 115 kg (253 lb)   SpO2 99%   BMI 43.43 kg/m²    Physical Exam  Constitutional:       Appearance: She is well-developed.   HENT:      Head: Normocephalic and atraumatic.   Eyes:      Pupils: Pupils are equal, round, and reactive to light.   Cardiovascular:      Rate and Rhythm: Normal rate and regular rhythm.   Pulmonary:      Effort: Pulmonary effort is normal. No accessory muscle usage or respiratory distress.      Breath sounds: Normal breath sounds.   Abdominal:      General: Bowel sounds are normal.      Palpations: Abdomen is soft. There is no mass.      Tenderness: There is no abdominal tenderness.   Musculoskeletal:         General: Normal range of motion.   Skin:     General: Skin is warm.      Capillary Refill: Capillary refill takes less than 2 seconds.   Neurological:      General: No focal deficit present.      Mental Status: She is alert.   Psychiatric:         Mood and Affect: Mood normal. Mood is not anxious.           EKG/LABS  Results for orders placed or performed during the hospital encounter of 25   EKG    Collection Time: 25  6:09 AM   Result Value Ref Range    Report       [facility] Emergency Dept.    Test Date:  2025  Pt Name:    HARLEY DE LA CRUZ              Department: ER  MRN:        [id number]                      Room:  Gender:     Female                       Technician: [id number]  :        1990                   Requested By:ER TRIAGE PROTOCOL  Order #:    [id number]                    Reading MD: " Juan José Macdonald MD    Measurements  Intervals                                Axis  Rate:       65                           P:          56  NC:         152                          QRS:        30  QRSD:       91                           T:          33  QT:         435  QTc:        453    Interpretive Statements  EKG is normal sinus rhythm, normal axis normal intervals no ST changes  consistent with acute regional ischemia  Electronically Signed On 06- 06:09:24 PDT by Juan José Macdonald MD     CBC WITH DIFFERENTIAL    Collection Time: 06/21/25  7:12 AM   Result Value Ref Range    WBC 6.5 4.8 - 10.8 K/uL    RBC 4.25 4.20 - 5.40 M/uL    Hemoglobin 13.9 12.0 - 16.0 g/dL    Hematocrit 39.3 37.0 - 47.0 %    MCV 92.5 81.4 - 97.8 fL    MCH 32.7 27.0 - 33.0 pg    MCHC 35.4 32.2 - 35.5 g/dL    RDW 42.6 35.9 - 50.0 fL    Platelet Count 151 (L) 164 - 446 K/uL    MPV 11.4 9.0 - 12.9 fL    Neutrophils-Polys 71.10 44.00 - 72.00 %    Lymphocytes 18.70 (L) 22.00 - 41.00 %    Monocytes 7.70 0.00 - 13.40 %    Eosinophils 1.20 0.00 - 6.90 %    Basophils 0.80 0.00 - 1.80 %    Immature Granulocytes 0.50 0.00 - 0.90 %    Nucleated RBC 0.00 0.00 - 0.20 /100 WBC    Neutrophils (Absolute) 4.59 1.82 - 7.42 K/uL    Lymphs (Absolute) 1.21 1.00 - 4.80 K/uL    Monos (Absolute) 0.50 0.00 - 0.85 K/uL    Eos (Absolute) 0.08 0.00 - 0.51 K/uL    Baso (Absolute) 0.05 0.00 - 0.12 K/uL    Immature Granulocytes (abs) 0.03 0.00 - 0.11 K/uL    NRBC (Absolute) 0.00 K/uL   CMP    Collection Time: 06/21/25  7:12 AM   Result Value Ref Range    Sodium 139 135 - 145 mmol/L    Potassium 3.8 3.6 - 5.5 mmol/L    Chloride 112 96 - 112 mmol/L    Co2 15 (L) 20 - 33 mmol/L    Anion Gap 12.0 7.0 - 16.0    Glucose 126 (H) 65 - 99 mg/dL    Bun 12 8 - 22 mg/dL    Creatinine 0.89 0.50 - 1.40 mg/dL    Calcium 9.0 8.5 - 10.5 mg/dL    Correct Calcium 8.8 8.5 - 10.5 mg/dL    AST(SGOT) 12 12 - 45 U/L    ALT(SGPT) 21 2 - 50 U/L    Alkaline Phosphatase 57 30 - 99 U/L    Total  Bilirubin 0.4 0.1 - 1.5 mg/dL    Albumin 4.2 3.2 - 4.9 g/dL    Total Protein 6.4 6.0 - 8.2 g/dL    Globulin 2.2 1.9 - 3.5 g/dL    A-G Ratio 1.9 g/dL   TROPONIN    Collection Time: 06/21/25  7:12 AM   Result Value Ref Range    Troponin T <6 6 - 19 ng/L   D-DIMER    Collection Time: 06/21/25  7:12 AM   Result Value Ref Range    D-Dimer <0.27 0.00 - 0.50 ug/mL (FEU)   proBrain Natriuretic Peptide, NT    Collection Time: 06/21/25  7:12 AM   Result Value Ref Range    NT-proBNP 81 0 - 125 pg/mL   ESTIMATED GFR    Collection Time: 06/21/25  7:12 AM   Result Value Ref Range    GFR (CKD-EPI) 86 >60 mL/min/1.73 m 2     *Note: Due to a large number of results and/or encounters for the requested time period, some results have not been displayed. A complete set of results can be found in Results Review.       I have independently interpreted this EKG    RADIOLOGY/PROCEDURES   I have independently interpreted the diagnostic imaging associated with this visit and am waiting the final reading from the radiologist.   My preliminary interpretation is as follows: Unremarkable chest x-ray.    Radiologist interpretation:  DX-CHEST-PORTABLE (1 VIEW)   Final Result         1.  No acute cardiopulmonary disease.            COURSE & MEDICAL DECISION MAKING    ASSESSMENT, COURSE AND PLAN  Care Narrative:   Patient here with likely ongoing symptoms related to her POTS, with worsening dizziness which is likely iatrogenic from carvedilol. Pt with reassuring exam otherwise. GIven her hx of abn stress test and microvascular cardiovascular disease we will check troponin.  Given the associated shortness of breath and chest pain and patient's morbid obesity will check D-dimer.  Checking chest x-ray to evaluate for pneumonia.  Patient has associated back pain or tearing quality pain.    Basic labs, troponin, D-dimer, chest x-ray are all very reassuring.patient will follow-up with primary care provider for further care.    I have decreased patient's  "carvedilol dose to 12.5.  Return precautions reviewed.  Discharge heart rate is within normal limits                FINAL DIAGNOSIS  1. Lightheadedness        2. Chronic coronary microvascular dysfunction  carvedilol (COREG) 12.5 MG Tab      3. Microvascular angina (HCC)        4. Inappropriate sinus tachycardia (HCC)        5. POTS (postural orthostatic tachycardia syndrome)        6. SVT (supraventricular tachycardia) (HCC)                   [1]   Allergies  Allergen Reactions    Cefdinir Shortness of Breath and Itching     Tolerated 1/18/17  Tolerates ceftriaxone  Tolerated augmentin 8/2019     Depakote [Divalproex Sodium] Unspecified     Muscle spasms/muscle pain and weakness      Doxycycline Anaphylaxis and Vomiting     Other reaction(s): pustules/blisters  Other reaction(s): stomach pain    Montelukast [Singulair] Unspecified     Cardiac effusion    Vancomycin Itching     Pt becomes flushed in face and chest.   RXN=7/10/16    Wound Dressing Adhesive Rash     By pt report-\"removes skin totally off\"    Amitriptyline Unspecified     Headaches      Amoxicillin Rash      Tolerates augmentin    Aripiprazole [Abilify] Unspecified     Headaches/muscle twitching      Clindamycin Nausea         Other reaction(s): nausea stomach pain    Erythromycin Rash     .  Other reaction(s): nausea stomach pain    Flomax [Tamsulosin Hydrochloride] Swelling    Hydromorphone      Other reaction(s): vomiting    Levaquin Unspecified     Severe muscle cramps in legs  RXN=unknown  Other reaction(s): leg muscle cramps    Metformin Unspecified     Increased lactic acid     Other reaction(s): itching and rash/nausea vomiting    Sulfa Drugs Hives, Itching, Myalgia and Unspecified     Muscle pain and weakness    Other reaction(s): unknown    Tamsulosin Swelling     Swelling of legs    Tape Rash     Tears skin off  coban with Tegaderm tape ok intermittently  RXN=ongoing    Sulfamethoxazole W-Trimethoprim Rash    Ciprofloxacin Rash          " "Keflex Rash     Pt states she gets a rash with this medication  Tolerates ceftriaxone  Can take with Benadryl    Levofloxacin Unspecified     Leg muscle cramps    Metronidazole Rash     \"Vision problems\"  Other reaction(s): vision problems     "

## 2025-06-21 NOTE — ED TRIAGE NOTES
Chief Complaint   Patient presents with    Chest Pain     L sided chest pain x 1 week    Shortness of Breath     SOB x 3 days, worse when lying down and walking.     Dizziness     Light headedness x 1 week     Pt ambulatory to triage for above complaint. A&Ox4, GCS 15, speaking in full sentences. Respirations equal and unlabored. NAD.  Appropriate protocols ordered.   Pt educated on triage process and instructed to notify staff of worsening condition.   Pt placed in the lobby in stable condition.

## 2025-06-21 NOTE — ED NOTES
Pt has discharge orders. Pt educated on discharge instructions and new prescriptions.  Pt verbalizes understanding.  PIV removed. Pt able to walk to  with slightly unsteady gait. Pt states this is baseline gait and states has assistive equipment at home.  Pt wheeled to lobby with . Pt going home with uber.

## 2025-06-21 NOTE — ED NOTES
Rounded on pt. Fluid bolus still infusing.  Pt updated on POC. Pt states they are concerned about discharging home because of low HR. HR sustaining in the low 50s with lowest touching 48.  BP remains stable.  ERP updated on pt concerns.

## 2025-06-21 NOTE — DISCHARGE INSTRUCTIONS
Follow-up closely with your primary care provider and your cardiologist.  We have decreased your carvedilol from 25 mg twice daily to 12.5 mg twice daily to see if this helps with your symptoms.  Your workup today was very reassuring, your EKG was reassuring, your heart enzyme was reassuring, your blood clot enzyme was reassuring.  Follow-up with your outpatient team.

## 2025-06-21 NOTE — ED NOTES
Pt escorted to room with RN, pt is SOB and has increased WOB, Pt reports left sided cp 7/10. Pt on cardiac and o2 monitor and call light in reach

## 2025-06-24 ENCOUNTER — APPOINTMENT (OUTPATIENT)
Dept: PHYSICAL THERAPY | Facility: REHABILITATION | Age: 35
End: 2025-06-24
Attending: STUDENT IN AN ORGANIZED HEALTH CARE EDUCATION/TRAINING PROGRAM
Payer: MEDICARE

## 2025-06-24 PROCEDURE — RXMED WILLOW AMBULATORY MEDICATION CHARGE: Performed by: PHYSICIAN ASSISTANT

## 2025-06-25 PROCEDURE — RXMED WILLOW AMBULATORY MEDICATION CHARGE: Performed by: PHYSICIAN ASSISTANT

## 2025-06-26 ENCOUNTER — APPOINTMENT (OUTPATIENT)
Dept: PHYSICAL THERAPY | Facility: REHABILITATION | Age: 35
End: 2025-06-26
Attending: STUDENT IN AN ORGANIZED HEALTH CARE EDUCATION/TRAINING PROGRAM
Payer: MEDICARE

## 2025-06-26 DIAGNOSIS — F25.1 SCHIZOAFFECTIVE DISORDER, DEPRESSIVE TYPE (HCC): ICD-10-CM

## 2025-06-26 PROCEDURE — RXMED WILLOW AMBULATORY MEDICATION CHARGE: Performed by: STUDENT IN AN ORGANIZED HEALTH CARE EDUCATION/TRAINING PROGRAM

## 2025-06-26 PROCEDURE — RXMED WILLOW AMBULATORY MEDICATION CHARGE: Performed by: INTERNAL MEDICINE

## 2025-06-26 PROCEDURE — RXMED WILLOW AMBULATORY MEDICATION CHARGE: Performed by: NURSE PRACTITIONER

## 2025-06-27 ENCOUNTER — PHARMACY VISIT (OUTPATIENT)
Dept: PHARMACY | Facility: MEDICAL CENTER | Age: 35
End: 2025-06-27
Payer: COMMERCIAL

## 2025-06-28 ENCOUNTER — HOSPITAL ENCOUNTER (EMERGENCY)
Facility: MEDICAL CENTER | Age: 35
End: 2025-06-28
Attending: EMERGENCY MEDICINE
Payer: MEDICARE

## 2025-06-28 VITALS
OXYGEN SATURATION: 97 % | HEART RATE: 60 BPM | SYSTOLIC BLOOD PRESSURE: 121 MMHG | RESPIRATION RATE: 16 BRPM | DIASTOLIC BLOOD PRESSURE: 59 MMHG | BODY MASS INDEX: 43.18 KG/M2 | TEMPERATURE: 97 F | WEIGHT: 251.54 LBS

## 2025-06-28 DIAGNOSIS — R51.9 ACUTE NONINTRACTABLE HEADACHE, UNSPECIFIED HEADACHE TYPE: Primary | ICD-10-CM

## 2025-06-28 PROCEDURE — 700101 HCHG RX REV CODE 250: Mod: UD | Performed by: EMERGENCY MEDICINE

## 2025-06-28 PROCEDURE — 99285 EMERGENCY DEPT VISIT HI MDM: CPT

## 2025-06-28 PROCEDURE — 700102 HCHG RX REV CODE 250 W/ 637 OVERRIDE(OP): Mod: UD | Performed by: EMERGENCY MEDICINE

## 2025-06-28 PROCEDURE — A9270 NON-COVERED ITEM OR SERVICE: HCPCS | Mod: UD | Performed by: EMERGENCY MEDICINE

## 2025-06-28 RX ORDER — BUTALBITAL, ACETAMINOPHEN AND CAFFEINE 50; 325; 40 MG/1; MG/1; MG/1
1 TABLET ORAL ONCE
Status: COMPLETED | OUTPATIENT
Start: 2025-06-28 | End: 2025-06-28

## 2025-06-28 RX ORDER — PROPARACAINE HYDROCHLORIDE 5 MG/ML
1 SOLUTION/ DROPS OPHTHALMIC ONCE
Status: COMPLETED | OUTPATIENT
Start: 2025-06-28 | End: 2025-06-28

## 2025-06-28 RX ADMIN — BUTALBITAL, ACETAMINOPHEN AND CAFFEINE 1 TABLET: 325; 50; 40 TABLET ORAL at 14:12

## 2025-06-28 RX ADMIN — PROPARACAINE HYDROCHLORIDE 1 DROP: 5 SOLUTION/ DROPS OPHTHALMIC at 14:15

## 2025-06-28 ASSESSMENT — PAIN DESCRIPTION - PAIN TYPE: TYPE: ACUTE PAIN

## 2025-06-28 ASSESSMENT — FIBROSIS 4 INDEX: FIB4 SCORE: 0.61

## 2025-06-28 NOTE — ED PROVIDER NOTES
"ED Provider Note    CHIEF COMPLAINT  Chief Complaint   Patient presents with    Headache     Reports head pressure that gets worse when laying down, and hypersensitivity to light.        EXTERNAL RECORDS REVIEWED  6/10/2025, outpatient neurology:  \"Kristin Balderrama is a 34 y.o. woman with history of possible conversion disorder due to multiple episodes of neurologic dysfunction with negative workup, L4-L5 fusion, Annetta-Danlos, gastroparesis, bladder dysfunction status post surgery, migraine headaches, schizoaffective disorder, and inappropriate sinus tachycardia status post ablation.  Her migraine headaches are being managed by [facility]. She is taking Nurtec, Emgality, lamotrigine, Topamax and getting Botox\"    HPI/ROS    Kristni Balderrama is a 34 y.o. female who presents for evaluation of a headache, chronic headaches but this one she reports seems different.  She normally has severe photophobia at baseline, her photophobia now is rated as 20 out of 10.  Headache is in the back of her head.  No vomiting.  No head injury.  Reports that she is under the care of ophthalmology for frequent intraocular pressure checks to evaluate for potential intermittent hydrocephalus.  She reports intraocular pressure usually between 20 and 22.  She does have a history of migraines, she takes Nurtec and lamotrigine and Topamax and Botox as well as the tracks, reports that she has been on all of her regular medications and this headache still persist.  No focal weakness numbness or tingling.  No fever.  No vomiting.  She has no other complaints at this time.    PAST MEDICAL HISTORY   has a past medical history of Abdominal pain, Anginal syndrome, Apnea, sleep, Arthritis, ASTHMA, Back pain, Borderline personality disorder (HCC), Chickenpox, Chronic UTI (09/18/2010), Daytime sleepiness, Dental disorder (03/08/2021), Depression, Diabetes ([facility]), Diarrhea, Disorder of thyroid, Fatigue, Frequent headaches, Heart burn, History " of falling, Inappropriate sinus tachycardia (HCC) (2016), Migraine, Mitochondrial disease (HCC), Multiple personality disorder (HCC), Obesity, Pain, Painful joint, PCOS (polycystic ovarian syndrome), Pneumonia (2012 12/2020), POTS (postural orthostatic tachycardia syndrome), Psychosis (HCC), Ringing in ears, Scoliosis, Shortness of breath, Sinus tachycardia (10/31/2013), Sleep apnea, Snoring, Supraventricular tachycardia (HCC) (04/10/2019), Transverse myelitis (HCC), Tuberculosis, Urinary bladder disorder, Urinary incontinence, Weakness, and Wears glasses.    SURGICAL HISTORY   has a past surgical history that includes neuro dest facet l/s w/ig sngl (04/21/2015); recovery (01/27/2016); delmar by laparoscopy (08/29/2010); lumbar fusion anterior (08/21/2012); other cardiac surgery (01/2016); tonsillectomy; bowel stimulator insertion (07/13/2016); gastroscopy with balloon dilatation (N/A, 01/04/2017); muscle biopsy (Right, 01/26/2017); other abdominal surgery; laminotomy; bowel stimulator insertion (03/10/2021); lap,diagnostic abdomen (02/14/2022); ovarian cystectomy (Right, 02/14/2022); percut fix prox/neck femur fx (Left, 01/28/2023); laparoscopic inguinal hernia repair (Right, 07/21/2023); hernia repair (Right, 07/21/2023); cystoscopy,insert ureteral stent (Right, 2/12/2024); cysto/uretero/pyeloscopy, dx (Right, 2/12/2024); and lasertripsy (Right, 2/12/2024).    FAMILY HISTORY  Family History   Problem Relation Age of Onset    Hypertension Mother     Sleep Apnea Mother     Heart Disease Mother     Other Mother         hypothryod    No Known Problems Sister     Other Sister         Narcolepsy;fibromyalsia;bone;nerve    Genitourinary () Problems Sister         endometriosis    Hypertension Maternal Uncle     Heart Disease Maternal Grandmother     Hypertension Maternal Grandmother     Cancer Neg Hx        SOCIAL HISTORY  Social History     Tobacco Use    Smoking status: Never     Passive exposure: Never    Smokeless  tobacco: Never   Vaping Use    Vaping status: Never Used   Substance and Sexual Activity    Alcohol use: No     Alcohol/week: 0.0 oz    Drug use: Never    Sexual activity: Not Currently     Birth control/protection: Implant       CURRENT MEDICATIONS  Home Medications    **Home medications have not yet been reviewed for this encounter**         ALLERGIES  Allergies[1]    PHYSICAL EXAM  VITAL SIGNS: BP (!) 141/79   Pulse 60   Temp 36.1 °C (96.9 °F) (Temporal)   Resp 16   Wt 114 kg (251 lb 8.7 oz)   SpO2 98%   BMI 43.18 kg/m²    Constitutional: Alert in no apparent distress.  Lying in the dark room with a blindfold on.  HENT: No signs of significant acute trauma.   Eyes: Conjunctiva normal, non-icteric.  Pupils are equal and reactive.  IOP: OD 20, OS 22.  Chest: Normal nonlabored respirations  Skin: No appreciable rash on the exposed skin  Musculoskeletal: No obvious acute trauma appreciated  Neurologic: Alert, no obvious focal deficits noted.     COURSE & MEDICAL DECISION MAKING    ASSESSMENT, COURSE AND PLAN  Care Narrative: This is a 34-year-old female with extensive neurologic and psychiatric processes, comes in today with a headache different than her usual, concerning for hydrocephalus.  She reports that she gets her IOP checked often to evaluate for hydrocephalus.  Insisted I checked her intraocular pressure, I did so and she reports that is her normal baseline.  No obvious focal neurologic deficit appreciated on exam.  The patient is under the care of neurology, there are multiple notes indicating potential conversion disorder, she is also treated by a different set of neurologist for chronic migraines.  At this point, I do not think the patient is in need of neuroimaging.  Will treat her with the Fioricet.       Upon reevaluation the patient reports a 30% improvement in her headache, I think that is a win.  There is really not much more to do for her here in the emergency department and I have encouraged  "her to follow-up with her neurology outpatient teams.  Return instructions provided, discharged in stable condition      DISPOSITION AND DISCUSSIONS    Escalation of care considered, and ultimately not performed: I did consider escalation to include neuroimaging but given her chronic symptoms as well as the suspicion of conversion disorder I did not feel as though any further imaging would be indicated here in the emergency department.    Decision tools and prescription drugs considered including, but not limited to: I did consider prescription of analgesia but she has outpatient neurology team and many prescriptions I did not feel as though an additional prescription would be of much benefit.    FINAL DIAGNOSIS  1. Acute nonintractable headache, unspecified headache type         Electronically signed by: Pedrito Bhatti M.D., 6/28/2025 1:36 PM           [1]   Allergies  Allergen Reactions    Cefdinir Shortness of Breath and Itching     Tolerated 1/18/17  Tolerates ceftriaxone  Tolerated augmentin 8/2019     Depakote [Divalproex Sodium] Unspecified     Muscle spasms/muscle pain and weakness      Doxycycline Anaphylaxis and Vomiting     Other reaction(s): pustules/blisters  Other reaction(s): stomach pain    Montelukast [Singulair] Unspecified     Cardiac effusion    Vancomycin Itching     Pt becomes flushed in face and chest.   RXN=7/10/16    Wound Dressing Adhesive Rash     By pt report-\"removes skin totally off\"    Amitriptyline Unspecified     Headaches      Amoxicillin Rash      Tolerates augmentin    Aripiprazole [Abilify] Unspecified     Headaches/muscle twitching      Clindamycin Nausea         Other reaction(s): nausea stomach pain    Erythromycin Rash     .  Other reaction(s): nausea stomach pain    Flomax [Tamsulosin Hydrochloride] Swelling    Hydromorphone      Other reaction(s): vomiting    Levaquin Unspecified     Severe muscle cramps in legs  RXN=unknown  Other reaction(s): leg muscle cramps    " "Metformin Unspecified     Increased lactic acid     Other reaction(s): itching and rash/nausea vomiting    Sulfa Drugs Hives, Itching, Myalgia and Unspecified     Muscle pain and weakness    Other reaction(s): unknown    Tamsulosin Swelling     Swelling of legs    Tape Rash     Tears skin off  coban with Tegaderm tape ok intermittently  RXN=ongoing    Sulfamethoxazole W-Trimethoprim Rash    Ciprofloxacin Rash          Keflex Rash     Pt states she gets a rash with this medication  Tolerates ceftriaxone  Can take with Benadryl    Levofloxacin Unspecified     Leg muscle cramps    Metronidazole Rash     \"Vision problems\"  Other reaction(s): vision problems     "

## 2025-06-28 NOTE — ED TRIAGE NOTES
Chief Complaint   Patient presents with    Headache     Reports head pressure that gets worse when laying down, and hypersensitivity to light.       Patient ambulatory to triage for above complaint. Hx hydrocephalus. Speech is clear.     BP (!) 137/90   Pulse 68   Temp 36.1 °C (96.9 °F) (Temporal)   Resp 16   Wt (P) 114 kg (251 lb 8.7 oz)   SpO2 98%   BMI (P) 43.18 kg/m²

## 2025-06-30 ENCOUNTER — PHARMACY VISIT (OUTPATIENT)
Dept: PHARMACY | Facility: MEDICAL CENTER | Age: 35
End: 2025-06-30
Payer: COMMERCIAL

## 2025-06-30 PROCEDURE — RXMED WILLOW AMBULATORY MEDICATION CHARGE: Performed by: STUDENT IN AN ORGANIZED HEALTH CARE EDUCATION/TRAINING PROGRAM

## 2025-06-30 RX ORDER — ZIPRASIDONE HYDROCHLORIDE 40 MG/1
40 CAPSULE ORAL
Qty: 90 CAPSULE | Refills: 1 | Status: SHIPPED | OUTPATIENT
Start: 2025-06-30

## 2025-07-01 ENCOUNTER — OFFICE VISIT (OUTPATIENT)
Dept: CARDIOLOGY | Facility: MEDICAL CENTER | Age: 35
End: 2025-07-01
Payer: MEDICARE

## 2025-07-01 ENCOUNTER — APPOINTMENT (OUTPATIENT)
Dept: PHYSICAL THERAPY | Facility: REHABILITATION | Age: 35
End: 2025-07-01
Attending: STUDENT IN AN ORGANIZED HEALTH CARE EDUCATION/TRAINING PROGRAM
Payer: MEDICARE

## 2025-07-01 VITALS
SYSTOLIC BLOOD PRESSURE: 112 MMHG | HEART RATE: 64 BPM | HEIGHT: 64 IN | BODY MASS INDEX: 43.02 KG/M2 | OXYGEN SATURATION: 95 % | WEIGHT: 252 LBS | DIASTOLIC BLOOD PRESSURE: 70 MMHG | RESPIRATION RATE: 16 BRPM

## 2025-07-01 DIAGNOSIS — I10 PRIMARY HYPERTENSION: ICD-10-CM

## 2025-07-01 DIAGNOSIS — G90.A POSTURAL ORTHOSTATIC TACHYCARDIA SYNDROME: ICD-10-CM

## 2025-07-01 DIAGNOSIS — I20.89 MICROVASCULAR ANGINA (HCC): Primary | ICD-10-CM

## 2025-07-01 DIAGNOSIS — I25.85 CHRONIC CORONARY MICROVASCULAR DYSFUNCTION: ICD-10-CM

## 2025-07-01 DIAGNOSIS — I47.11 INAPPROPRIATE SINUS TACHYCARDIA (HCC): ICD-10-CM

## 2025-07-01 DIAGNOSIS — G90.A POTS (POSTURAL ORTHOSTATIC TACHYCARDIA SYNDROME): ICD-10-CM

## 2025-07-01 DIAGNOSIS — R07.89 OTHER CHEST PAIN: ICD-10-CM

## 2025-07-01 PROCEDURE — 99212 OFFICE O/P EST SF 10 MIN: CPT

## 2025-07-01 PROCEDURE — 3074F SYST BP LT 130 MM HG: CPT

## 2025-07-01 PROCEDURE — RXMED WILLOW AMBULATORY MEDICATION CHARGE

## 2025-07-01 PROCEDURE — 99214 OFFICE O/P EST MOD 30 MIN: CPT

## 2025-07-01 PROCEDURE — 3078F DIAST BP <80 MM HG: CPT

## 2025-07-01 RX ORDER — LAMOTRIGINE 25 MG/1
50 TABLET ORAL 2 TIMES DAILY
COMMUNITY
Start: 2025-06-26 | End: 2025-07-03

## 2025-07-01 RX ORDER — ISOSORBIDE MONONITRATE 30 MG/1
30 TABLET, EXTENDED RELEASE ORAL EVERY MORNING
COMMUNITY
End: 2025-07-01

## 2025-07-01 RX ORDER — METOPROLOL SUCCINATE 50 MG/1
50 TABLET, EXTENDED RELEASE ORAL DAILY
COMMUNITY
End: 2025-07-01

## 2025-07-01 RX ORDER — ALBUTEROL SULFATE 5 MG/ML
2.5 SOLUTION RESPIRATORY (INHALATION) EVERY 4 HOURS PRN
COMMUNITY
End: 2025-07-03

## 2025-07-01 RX ORDER — METOPROLOL SUCCINATE 50 MG/1
50 TABLET, EXTENDED RELEASE ORAL
Qty: 180 TABLET | Refills: 3 | Status: SHIPPED | OUTPATIENT
Start: 2025-07-01

## 2025-07-01 ASSESSMENT — ENCOUNTER SYMPTOMS
DIZZINESS: 0
SHORTNESS OF BREATH: 0
PALPITATIONS: 1
LIGHT-HEADEDNESS: 0
NEAR-SYNCOPE: 1
DYSPNEA ON EXERTION: 0
HEADACHES: 0
ORTHOPNEA: 0
SYNCOPE: 0
PND: 0

## 2025-07-01 ASSESSMENT — FIBROSIS 4 INDEX: FIB4 SCORE: 0.61

## 2025-07-01 NOTE — PROGRESS NOTES
Chief Complaint   Patient presents with    Supraventricular Tachycardia (SVT)     Follow up           Subjective:   Kristin Balderrama is a 33 y.o. female who presents today for follow-up.     Patient of Dr. Smith.  Current medical problems include sinus tachycardia, ablation 2016 for sinus node modification for IST (Inappropriate Sinus Tachycardia), IST ablation 2016, POTS on chronic Corlanor and beta-blocker therapy, hypermobility syndrome, TONYA, IIH (idiopathic intracranial hypertension), NPH normal pressure hydrocephalus, optic neuropathy, chronic pain syndrome, bladder incontinence, S/P sacral stimulator, cervical neuralgia with leg weakness, nephrolithiasis S/P kidney stone extraction stent placement 2/14/2024, hip fracture 1/27/2023 H/O psychiatric issues. Their last clinic visit was 6/2/2025 with myself.    Interval history:  At that last follow up patient has had multiple ED visit for continued chest pain. During her Ed workup her troponin and cardiac markers are negative. A stress test was ordered due to risk and continued chest pain. It did show apical wall, apical septum ischemia, summed stress difference score is 6.     Patient presented to the ED on 2/15/2025 for chest pain. After consultation with cardiology she did agree to move forward with cardiac catheterization. Patient underwent LHC with Dr. Bryant on 2/17/2025 which did not show any obstructive CAD. Recommendation for an outpatient cardiac CT for assessment of microvascular disease.    Patient has had two more ED visit for chest pain. Troponin negative and EKG negative for any ischemic changes.     6/2/2025 visit:  Since last follow up she had gone to the ED twice related to chest pain associated with near syncope. She said she will be sitting in a chair and get chest pain and feel near syncopal. She will take a nitroglycerin which does help and take the pain away but the nitroglycerin will wear off and she will feel the same again. She does  say she has been having more difficult patients at work and having to exert herself more which does increase her symptoms. She is working with PT for her foot and able to do all the exercises. She denies orthopnea, PND, edema, or syncope.      Today's visit:  Since last visit and adjusting the carvedilol she said it was improving the chest pain but then started experiencing her heart rate drop into the 30-40s on her smart watch and up into the low 100s which caused her to feel dizzy/lightheaded and unwell. Since changing to carvedilol she also started having increased headaches and migraines which she most recently went to the ED on 6/28/2025. She states she does have chest pain but has not been taking the nitroglycerin. She has spoken to HALEY Simon and is expecting a call this week to discuss recommendations and possible follow up for functional testing for microvascular disease and POTs.    Cardiovascular Risk Factors:  1. Smoking status: Never  2. Type II Diabetes Mellitus: No   Lab Results   Component Value Date/Time    HBA1C 5.8 02/18/2025 05:02 PM    HBA1C 6.0 (A) 09/23/2024 12:11 PM     3. Hypertension: Yes  4. Dyslipidemia: No   Cholesterol,Tot   Date Value Ref Range Status   01/20/2021 172 100 - 199 mg/dL Final     LDL   Date Value Ref Range Status   01/20/2021 98 <100 mg/dL Final     HDL   Date Value Ref Range Status   01/20/2021 48 >=40 mg/dL Final     Triglycerides   Date Value Ref Range Status   01/20/2021 130 0 - 149 mg/dL Final       Past Medical History:   Diagnosis Date    Abdominal pain     Anginal syndrome     Random chest pain especially with tachycardia    Apnea, sleep     Arthritis     ASTHMA     when around smoke    Back pain     Borderline personality disorder (HCC)     Chickenpox     Chronic UTI 09/18/2010    Daytime sleepiness     Dental disorder 03/08/2021    Infected tooth    Depression     Diabetes (HCC)     Diarrhea     Incontinece    Disorder of thyroid     Hashimoto's    Fatigue      "Frequent headaches     Heart burn     History of falling     Inappropriate sinus tachycardia (HCC) 2016    Statusp post ablation by Dr. Kumar.    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    Urinary bladder disorder     Suprapubic cath. 2/8/22: Not anymore.     Urinary incontinence     Weakness     Wears glasses          Family History   Problem Relation Age of Onset    Hypertension Mother     Sleep Apnea Mother     Heart Disease Mother     Other Mother         hypothryod    No Known Problems Sister     Other Sister         Narcolepsy;fibromyalsia;bone;nerve    Genitourinary () Problems Sister         endometriosis    Hypertension Maternal Uncle     Heart Disease Maternal Grandmother     Hypertension Maternal Grandmother     Cancer Neg Hx          Social History     Tobacco Use    Smoking status: Never     Passive exposure: Never    Smokeless tobacco: Never   Vaping Use    Vaping status: Never Used   Substance Use Topics    Alcohol use: No     Alcohol/week: 0.0 oz    Drug use: Never         Allergies   Allergen Reactions    Cefdinir Shortness of Breath and Itching     Tolerated 1/18/17  Tolerates ceftriaxone  Tolerated augmentin 8/2019     Depakote [Divalproex Sodium] Unspecified     Muscle spasms/muscle pain and weakness      Doxycycline Anaphylaxis and Vomiting     Other reaction(s): pustules/blisters  Other reaction(s): stomach " "pain    Montelukast [Singulair] Unspecified     Cardiac effusion    Vancomycin Itching     Pt becomes flushed in face and chest.   RXN=7/10/16    Wound Dressing Adhesive Rash     By pt report-\"removes skin totally off\"    Amitriptyline Unspecified     Headaches      Amoxicillin Rash      Tolerates augmentin    Aripiprazole [Abilify] Unspecified     Headaches/muscle twitching      Clindamycin Nausea         Other reaction(s): nausea stomach pain    Erythromycin Rash     .  Other reaction(s): nausea stomach pain    Flomax [Tamsulosin Hydrochloride] Swelling    Hydromorphone      Other reaction(s): vomiting    Levaquin Unspecified     Severe muscle cramps in legs  RXN=unknown  Other reaction(s): leg muscle cramps    Metformin Unspecified     Increased lactic acid     Other reaction(s): itching and rash/nausea vomiting    Sulfa Drugs Hives, Itching, Myalgia and Unspecified     Muscle pain and weakness    Other reaction(s): unknown    Tamsulosin Swelling     Swelling of legs    Tape Rash     Tears skin off  coban with Tegaderm tape ok intermittently  RXN=ongoing    Sulfamethoxazole W-Trimethoprim Rash    Ciprofloxacin Rash          Keflex Rash     Pt states she gets a rash with this medication  Tolerates ceftriaxone  Can take with Benadryl    Levofloxacin Unspecified     Leg muscle cramps    Metronidazole Rash     \"Vision problems\"  Other reaction(s): vision problems         Current Outpatient Medications   Medication Sig    lamoTRIgine (LAMICTAL) 25 MG Tab Take 50 mg by mouth 2 times a day.    albuterol (PROVENTIL) 2.5mg/0.5ml Nebu Soln Take 2.5 mg by nebulization every four hours as needed for Shortness of Breath.    metoprolol SR (TOPROL XL) 50 MG TABLET SR 24 HR Take 1 Tablet by mouth 2 times a day.    ziprasidone (GEODON) 40 MG Cap Take 1 capsule by mouth at bedtime.    lamoTRIgine (LAMICTAL) 25 MG Tab Take 2 Tablets by mouth 2 times a day.    tizanidine (ZANAFLEX) 4 MG capsule Take 1 Capsule by mouth Three Times A " Day as needed    pyridostigmine (MESTINON) 60 MG Tab Take 1 Tablet by mouth 3 times a day.    BOTOX 200 units Recon Soln injection give 155 units IM per the FDA approved paradigm for treatment of chronic migraine q 12 weeks    gabapentin (NEURONTIN) 400 MG Cap TAKE 3 CAPSULES BY MOUTH THREE TIMES DAILY.  FOR 30 DAY SUPPLY. DX: M79.7    isosorbide mononitrate SR (IMDUR) 60 MG TABLET SR 24 HR Take 1 Tablet by mouth every morning.    ivabradine (CORLANOR) 7.5 MG Tab tablet Take 1 Tablet by mouth 2 times a day with meals.    amLODIPine (NORVASC) 5 MG Tab Take 1 tablet by mouth every day.    nitroglycerin (NITROLINGUAL) 0.4 MG/SPRAY Solution Place 1 Spray under the tongue as needed (chest pains).    ondansetron (ZOFRAN ODT) 4 MG TABLET DISPERSIBLE Dissolve 1 Tablet by mouth every four hours as needed for Nausea/Vomiting (give PO if no IV route available).    Etonogestrel (NEXPLANON SC) Inject 1 Application under the skin one time.    FIBER PO Take 3 Tablets by mouth every day.    ipratropium-albuterol (COMBIVENT RESPIMAT)  MCG/ACT Aero Soln Inhale 1 Puff 4 times a day as needed (Shortness of breath).    dicyclomine (BENTYL) 10 MG Cap Take 1 Capsule by mouth 2 times a day as needed for abdominal cramping/discomfort    Ranolazine 1000 MG TABLET SR 12 HR Take 1 Tablet by mouth 2 times a day.    aspirin 81 MG EC tablet Take 1 Tablet by mouth every day.    docusate sodium (COLACE) 250 MG capsule Take 250 mg by mouth 2 times a day.    spironolactone (ALDACTONE) 50 MG Tab Take 1 Tablet by mouth every day.    omeprazole (PRILOSEC) 20 MG delayed-release capsule Take 2 Capsules by mouth 2 times a day.    topiramate (TOPAMAX) 50 MG tablet Take 1 Tablet by mouth 2 times a day.    scopolamine (TRANSDERM SCOP, 1.5 MG,) 1 mg/72hr PATCH 72 HR Place 1 Patch on the skin every 72 hours. (Patient taking differently: Place 1 Patch on the skin as needed.)    diphenhydrAMINE (BENADRYL) 25 MG Tab Take 50 mg by mouth at bedtime.     Melatonin 10 MG Tab Take 10 mg by mouth at bedtime.    tizanidine (ZANAFLEX) 4 MG Tab Take 1 Tablet by mouth Three Times A Day as needed for muscle cramping and spasms    gabapentin (NEURONTIN) 400 MG Cap TAKE 3 CAPSULES BY MOUTH THREE TIMES DAILY.  FOR 30 DAY SUPPLY. DX: M79.7    Rimegepant Sulfate (NURTEC) 75 MG TABLET DISPERSIBLE Take 1 tablet by mouth at onset of migraine or aura okay to repeat in 24 hours if needed. (Patient not taking: Reported on 7/1/2025)    methylPREDNISolone (MEDROL DOSEPAK) 4 MG Tablet Therapy Pack Follow schedule on package instructions. (Patient not taking: Reported on 7/1/2025)    gabapentin (NEURONTIN) 400 MG Cap TAKE 3 CAPSULES BY MOUTH THREE TIMES DAILY. (Patient not taking: Reported on 6/10/2025)    polyethylene glycol-electrolytes (GOLYTELY) 236 GM Recon Soln Follow [facility] instructions handout    methocarbamol (ROBAXIN) 500 MG Tab Take 1 Tablet by mouth 4 times a day. (Patient not taking: Reported on 7/1/2025)    albuterol 108 (90 Base) MCG/ACT Aero Soln inhalation aerosol Inhale 1-2 Puffs every four hours as needed for Shortness of Breath. (Patient not taking: Reported on 7/1/2025)    Galcanezumab-gnlm (EMGALITY) 120 MG/ML Solution Auto-injector Inject 1 mL subcutaneously every month. (Patient not taking: Reported on 7/1/2025)    sumatriptan (IMITREX) 20 MG/ACT nasal spray Give 1 dose at onset headache okay to repeat in 2 hours if needed for max of 2 doses in a 24 hour timeframe. (Patient not taking: Reported on 7/1/2025)         Review of Systems   Constitutional: Positive for malaise/fatigue.   Cardiovascular:  Positive for chest pain, near-syncope and palpitations. Negative for dyspnea on exertion, leg swelling, orthopnea, paroxysmal nocturnal dyspnea and syncope.   Respiratory:  Negative for shortness of breath.    Neurological:  Negative for dizziness, headaches and light-headedness.           Objective:   /70 (BP Location: Left arm, Patient Position:  "Sitting)   Pulse 64   Resp 16   Ht 1.626 m (5' 4\")   Wt 114 kg (252 lb)   SpO2 95%  Body mass index is 43.26 kg/m².         Physical Exam  Vitals reviewed.   Constitutional:       General: She is not in acute distress.     Appearance: She is obese.   HENT:      Head: Normocephalic and atraumatic.   Cardiovascular:      Rate and Rhythm: Normal rate and regular rhythm.      Pulses: Normal pulses.      Heart sounds: No murmur heard.  Pulmonary:      Effort: Pulmonary effort is normal. No respiratory distress.      Breath sounds: Normal breath sounds.   Musculoskeletal:      Right lower leg: No edema.      Left lower leg: No edema.   Neurological:      Mental Status: She is alert and oriented to person, place, and time.      Gait: Gait normal.   Psychiatric:         Behavior: Behavior normal.             Lab Results   Component Value Date/Time    SODIUM 139 06/21/2025 07:12 AM    POTASSIUM 3.8 06/21/2025 07:12 AM    CHLORIDE 112 06/21/2025 07:12 AM    CO2 15 (L) 06/21/2025 07:12 AM    GLUCOSE 126 (H) 06/21/2025 07:12 AM    BUN 12 06/21/2025 07:12 AM    CREATININE 0.89 06/21/2025 07:12 AM    CREATININE 0.75 (L) 07/20/2010 11:00 AM    BUNCREATRAT 20.0 01/22/2025 05:10 PM    BUNCREATRAT 19 07/20/2010 11:00 AM    GLOMRATE >59 07/20/2010 11:00 AM      Lab Results   Component Value Date/Time    WBC 6.5 06/21/2025 07:12 AM    WBC 6.1 07/20/2010 11:00 AM    RBC 4.25 06/21/2025 07:12 AM    RBC 4.38 07/20/2010 11:00 AM    HEMOGLOBIN 13.9 06/21/2025 07:12 AM    HEMATOCRIT 39.3 06/21/2025 07:12 AM    MCV 92.5 06/21/2025 07:12 AM    MCV 93 07/20/2010 11:00 AM    MCH 32.7 06/21/2025 07:12 AM    MCH 30.1 07/20/2010 11:00 AM    MCHC 35.4 06/21/2025 07:12 AM    MPV 11.4 06/21/2025 07:12 AM    NEUTSPOLYS 71.10 06/21/2025 07:12 AM    LYMPHOCYTES 18.70 (L) 06/21/2025 07:12 AM    MONOCYTES 7.70 06/21/2025 07:12 AM    EOSINOPHILS 1.20 06/21/2025 07:12 AM    BASOPHILS 0.80 06/21/2025 07:12 AM    ANISOCYTOSIS 1+ 10/05/2024 04:35 AM    "   Lab Results   Component Value Date/Time    CHOLSTRLTOT 198 09/30/2024 07:14 AM     (H) 09/30/2024 07:14 AM    HDL 44 09/30/2024 07:14 AM    TRIGLYCERIDE 175 (H) 09/30/2024 07:14 AM       Lab Results   Component Value Date/Time    TROPONINT 7 07/17/2024 1913     Lab Results   Component Value Date/Time    NTPROBNP 70 07/17/2024 1913     Coronary Angiogram 2/17/2025  HEMODYNAMICS:   Aortic pressure: 112/79 mmHg  Pre-A wave pressure: 9 mmHg  No significant aortic gradient on pullback     CORONARY ANGIOGRAPHY:  The left main coronary artery is patent and bifurcates into the left anterior descending and left circumflex coronaries.  The left anterior descending coronary artery is a large, transapical vessel that supplies a moderate first diagonal branch and a small second diagonal branch and has no angiographically significant disease.  The left circumflex coronary artery is a large, nondominant vessel that supplies a small first obtuse marginal branch, a large and bifurcating second obtuse marginal branch, and a small third obtuse marginal branch and has no angiographically significant disease.  The right coronary artery is a large, dominant vessel that supplies a large posterior descending coronary and a large posterolateral branch and has no angiographically significant disease.     IMPRESSION:  Angiographically normal coronary arteries.  Normal left heart filling pressures.     RECOMMENDATIONS:  Return to floor with continued care per primary service.  TR band release per protocol.  Continue evaluation for chronic chest pain symptoms not due to obstructive epicardial coronary artery disease.      Cardiac PET 3/12/2025   PET IMAGING INTERPRETATION      Large defect of mildly to moderately reduced uptake in the apex, apical    anterior, apical septal, apical inferior, mid anterior, mid anteroseptal, mid      inferoseptal, mid inferior segments which is more present in stress than rest      images suggestive of  large scar and mild ischemia in the apical and mid    inferior segments.   Normal left ventricular wall motion.  LV ejection fraction = 73%.      FLOW RESERVE INTERPRETATION      Reduced coronary flow at rest with normal coronary flow reserve  Assessment:   1. Microvascular angina (HCC)  - metoprolol SR (TOPROL XL) 50 MG TABLET SR 24 HR; Take 1 Tablet by mouth 2 times a day.  Dispense: 180 Tablet; Refill: 3    2. Inappropriate sinus tachycardia (HCC)  - metoprolol SR (TOPROL XL) 50 MG TABLET SR 24 HR; Take 1 Tablet by mouth 2 times a day.  Dispense: 180 Tablet; Refill: 3    3. POTS (postural orthostatic tachycardia syndrome)  - metoprolol SR (TOPROL XL) 50 MG TABLET SR 24 HR; Take 1 Tablet by mouth 2 times a day.  Dispense: 180 Tablet; Refill: 3    4. Chronic coronary microvascular dysfunction    5. Postural orthostatic tachycardia syndrome    6. Other chest pain    7. Primary hypertension    Other orders  - lamoTRIgine (LAMICTAL) 25 MG Tab; Take 50 mg by mouth 2 times a day.  - albuterol (PROVENTIL) 2.5mg/0.5ml Nebu Soln; Take 2.5 mg by nebulization every four hours as needed for Shortness of Breath.      Medical Decision Making:  Today's Assessment / Plan:   Chest pain  Chronic coronary microvascular dysfunction  -chest pain continues but patient more concerned with heart rate and dizziness/lightheadedness, patient reports carvedilol worsen symptoms and side effects  -Continue Ranolazine 1000 mg twice a day  -stop carvedilol 12.5 mg twice a day, trialed to see if improves symptoms with additional vasodilator support but patient reported worsened symptoms.  -restart metoprolol 50 mg twice a day  -Continue imdur 60 mg daily, discussed after starting metoprolol can discuss a holiday for nitroglycerin to monitor for intolerance due to frequent use  -Nitroglycerine 0.4 mg spray as needed for chest pain not to exceed three doses  -referral to  [facility] for further microvascular workup and functional testing place and  patient reports should be getting a call today  -patient having frequent ED visits and office visit for continued symptoms. Patient does have polypharmacy and there is concern of mediation interactions with amount of medications she is taking and possible inappropriate use of nitroglycerine. Pending discussion with HALEY Simon and patient symptoms back on metoprolol will try to see if can decrease medications if able to do so without increased chest pain.     POTS  Inappropriate sinus tachycardia  -Continue to monitor salt through diet  -continue ivabradine 7.5 mg twice a day  -Continue spironolactone 50 mg daily   -stop carvedilol 12.5 mg twice a day, trialed to see if improves symptoms with additional vasodilator support but patient reported worsened symptoms.  -restart metoprolol 50 mg twice a day  -Blood pressure well controlled, continue to monitor at home    Hypertension  - Good control  -stop carvedilol 12.5 mg twice a day, trialed to see if improves symptoms with additional vasodilator support but patient reported worsened symptoms.  -restart metoprolol 50 mg twice a day  -continue amlodipine 5 mg daily  - goal < 130/80    Return in about 4 weeks (around 7/29/2025) for Edwina PUGA.  Sooner if problems.    PER Flores.

## 2025-07-03 ENCOUNTER — HOSPITAL ENCOUNTER (OUTPATIENT)
Facility: MEDICAL CENTER | Age: 35
End: 2025-07-04
Attending: EMERGENCY MEDICINE | Admitting: STUDENT IN AN ORGANIZED HEALTH CARE EDUCATION/TRAINING PROGRAM
Payer: MEDICARE

## 2025-07-03 ENCOUNTER — APPOINTMENT (OUTPATIENT)
Dept: RADIOLOGY | Facility: MEDICAL CENTER | Age: 35
End: 2025-07-03
Attending: EMERGENCY MEDICINE
Payer: MEDICARE

## 2025-07-03 ENCOUNTER — APPOINTMENT (OUTPATIENT)
Dept: PHYSICAL THERAPY | Facility: REHABILITATION | Age: 35
End: 2025-07-03
Attending: STUDENT IN AN ORGANIZED HEALTH CARE EDUCATION/TRAINING PROGRAM
Payer: MEDICARE

## 2025-07-03 ENCOUNTER — PHARMACY VISIT (OUTPATIENT)
Dept: PHARMACY | Facility: MEDICAL CENTER | Age: 35
End: 2025-07-03
Payer: COMMERCIAL

## 2025-07-03 ENCOUNTER — TELEPHONE (OUTPATIENT)
Dept: CARDIOLOGY | Facility: MEDICAL CENTER | Age: 35
End: 2025-07-03
Payer: MEDICARE

## 2025-07-03 DIAGNOSIS — R55 NEAR SYNCOPE: ICD-10-CM

## 2025-07-03 DIAGNOSIS — R00.1 BRADYCARDIA: Primary | ICD-10-CM

## 2025-07-03 PROBLEM — R42 DIZZINESS: Status: ACTIVE | Noted: 2025-07-03

## 2025-07-03 LAB
ALBUMIN SERPL BCP-MCNC: 4.4 G/DL (ref 3.2–4.9)
ALBUMIN/GLOB SERPL: 2 G/DL
ALP SERPL-CCNC: 63 U/L (ref 30–99)
ALT SERPL-CCNC: 20 U/L (ref 2–50)
ANION GAP SERPL CALC-SCNC: 12 MMOL/L (ref 7–16)
AST SERPL-CCNC: 14 U/L (ref 12–45)
BASOPHILS # BLD AUTO: 0.5 % (ref 0–1.8)
BASOPHILS # BLD: 0.03 K/UL (ref 0–0.12)
BILIRUB SERPL-MCNC: 0.4 MG/DL (ref 0.1–1.5)
BUN SERPL-MCNC: 15 MG/DL (ref 8–22)
CALCIUM ALBUM COR SERPL-MCNC: 9 MG/DL (ref 8.5–10.5)
CALCIUM SERPL-MCNC: 9.3 MG/DL (ref 8.5–10.5)
CHLORIDE SERPL-SCNC: 112 MMOL/L (ref 96–112)
CO2 SERPL-SCNC: 16 MMOL/L (ref 20–33)
CREAT SERPL-MCNC: 0.89 MG/DL (ref 0.5–1.4)
EKG IMPRESSION: NORMAL
EOSINOPHIL # BLD AUTO: 0.15 K/UL (ref 0–0.51)
EOSINOPHIL NFR BLD: 2.4 % (ref 0–6.9)
ERYTHROCYTE [DISTWIDTH] IN BLOOD BY AUTOMATED COUNT: 43.8 FL (ref 35.9–50)
GFR SERPLBLD CREATININE-BSD FMLA CKD-EPI: 86 ML/MIN/1.73 M 2
GLOBULIN SER CALC-MCNC: 2.2 G/DL (ref 1.9–3.5)
GLUCOSE SERPL-MCNC: 114 MG/DL (ref 65–99)
HCG SERPL QL: NEGATIVE
HCT VFR BLD AUTO: 41.1 % (ref 37–47)
HGB BLD-MCNC: 13.7 G/DL (ref 12–16)
IMM GRANULOCYTES # BLD AUTO: 0.03 K/UL (ref 0–0.11)
IMM GRANULOCYTES NFR BLD AUTO: 0.5 % (ref 0–0.9)
LYMPHOCYTES # BLD AUTO: 1.63 K/UL (ref 1–4.8)
LYMPHOCYTES NFR BLD: 25.9 % (ref 22–41)
MCH RBC QN AUTO: 32 PG (ref 27–33)
MCHC RBC AUTO-ENTMCNC: 33.3 G/DL (ref 32.2–35.5)
MCV RBC AUTO: 96 FL (ref 81.4–97.8)
MONOCYTES # BLD AUTO: 0.5 K/UL (ref 0–0.85)
MONOCYTES NFR BLD AUTO: 7.9 % (ref 0–13.4)
NEUTROPHILS # BLD AUTO: 3.95 K/UL (ref 1.82–7.42)
NEUTROPHILS NFR BLD: 62.8 % (ref 44–72)
NRBC # BLD AUTO: 0 K/UL
NRBC BLD-RTO: 0 /100 WBC (ref 0–0.2)
NT-PROBNP SERPL IA-MCNC: 207 PG/ML (ref 0–125)
PLATELET # BLD AUTO: 192 K/UL (ref 164–446)
PMV BLD AUTO: 11.7 FL (ref 9–12.9)
POTASSIUM SERPL-SCNC: 4 MMOL/L (ref 3.6–5.5)
PROT SERPL-MCNC: 6.6 G/DL (ref 6–8.2)
RBC # BLD AUTO: 4.28 M/UL (ref 4.2–5.4)
SODIUM SERPL-SCNC: 140 MMOL/L (ref 135–145)
TROPONIN T SERPL-MCNC: 13 NG/L (ref 6–19)
TROPONIN T SERPL-MCNC: <6 NG/L (ref 6–19)
TSH SERPL-ACNC: 2.96 UIU/ML (ref 0.38–5.33)
WBC # BLD AUTO: 6.3 K/UL (ref 4.8–10.8)

## 2025-07-03 PROCEDURE — 99223 1ST HOSP IP/OBS HIGH 75: CPT | Performed by: STUDENT IN AN ORGANIZED HEALTH CARE EDUCATION/TRAINING PROGRAM

## 2025-07-03 PROCEDURE — 84443 ASSAY THYROID STIM HORMONE: CPT

## 2025-07-03 PROCEDURE — 700102 HCHG RX REV CODE 250 W/ 637 OVERRIDE(OP): Mod: UD | Performed by: STUDENT IN AN ORGANIZED HEALTH CARE EDUCATION/TRAINING PROGRAM

## 2025-07-03 PROCEDURE — 83880 ASSAY OF NATRIURETIC PEPTIDE: CPT

## 2025-07-03 PROCEDURE — A9270 NON-COVERED ITEM OR SERVICE: HCPCS | Mod: UD | Performed by: STUDENT IN AN ORGANIZED HEALTH CARE EDUCATION/TRAINING PROGRAM

## 2025-07-03 PROCEDURE — 84484 ASSAY OF TROPONIN QUANT: CPT | Mod: 91

## 2025-07-03 PROCEDURE — 36415 COLL VENOUS BLD VENIPUNCTURE: CPT

## 2025-07-03 PROCEDURE — 80053 COMPREHEN METABOLIC PANEL: CPT

## 2025-07-03 PROCEDURE — 93005 ELECTROCARDIOGRAM TRACING: CPT | Mod: TC | Performed by: EMERGENCY MEDICINE

## 2025-07-03 PROCEDURE — 71045 X-RAY EXAM CHEST 1 VIEW: CPT

## 2025-07-03 PROCEDURE — G0378 HOSPITAL OBSERVATION PER HR: HCPCS

## 2025-07-03 PROCEDURE — 84703 CHORIONIC GONADOTROPIN ASSAY: CPT

## 2025-07-03 PROCEDURE — 93005 ELECTROCARDIOGRAM TRACING: CPT | Mod: TC

## 2025-07-03 PROCEDURE — 85025 COMPLETE CBC W/AUTO DIFF WBC: CPT

## 2025-07-03 PROCEDURE — 99285 EMERGENCY DEPT VISIT HI MDM: CPT

## 2025-07-03 PROCEDURE — 96374 THER/PROPH/DIAG INJ IV PUSH: CPT

## 2025-07-03 PROCEDURE — 700111 HCHG RX REV CODE 636 W/ 250 OVERRIDE (IP): Mod: JZ,UD | Performed by: STUDENT IN AN ORGANIZED HEALTH CARE EDUCATION/TRAINING PROGRAM

## 2025-07-03 RX ORDER — PROCHLORPERAZINE EDISYLATE 5 MG/ML
5-10 INJECTION INTRAMUSCULAR; INTRAVENOUS EVERY 4 HOURS PRN
Status: DISCONTINUED | OUTPATIENT
Start: 2025-07-03 | End: 2025-07-04 | Stop reason: HOSPADM

## 2025-07-03 RX ORDER — ASPIRIN 81 MG/1
81 TABLET ORAL DAILY
Status: DISCONTINUED | OUTPATIENT
Start: 2025-07-04 | End: 2025-07-04 | Stop reason: HOSPADM

## 2025-07-03 RX ORDER — ZIPRASIDONE HYDROCHLORIDE 40 MG/1
40 CAPSULE ORAL
Status: DISCONTINUED | OUTPATIENT
Start: 2025-07-03 | End: 2025-07-04 | Stop reason: HOSPADM

## 2025-07-03 RX ORDER — ACETAMINOPHEN 325 MG/1
650 TABLET ORAL EVERY 6 HOURS PRN
Status: DISCONTINUED | OUTPATIENT
Start: 2025-07-03 | End: 2025-07-04 | Stop reason: HOSPADM

## 2025-07-03 RX ORDER — OXYCODONE HYDROCHLORIDE 5 MG/1
2.5 TABLET ORAL
Refills: 0 | Status: DISCONTINUED | OUTPATIENT
Start: 2025-07-03 | End: 2025-07-04 | Stop reason: HOSPADM

## 2025-07-03 RX ORDER — ONDANSETRON 2 MG/ML
4 INJECTION INTRAMUSCULAR; INTRAVENOUS EVERY 4 HOURS PRN
Status: DISCONTINUED | OUTPATIENT
Start: 2025-07-03 | End: 2025-07-04 | Stop reason: HOSPADM

## 2025-07-03 RX ORDER — OXYCODONE HYDROCHLORIDE 5 MG/1
5 TABLET ORAL
Refills: 0 | Status: DISCONTINUED | OUTPATIENT
Start: 2025-07-03 | End: 2025-07-04 | Stop reason: HOSPADM

## 2025-07-03 RX ORDER — PROMETHAZINE HYDROCHLORIDE 25 MG/1
12.5-25 SUPPOSITORY RECTAL EVERY 4 HOURS PRN
Status: DISCONTINUED | OUTPATIENT
Start: 2025-07-03 | End: 2025-07-04 | Stop reason: HOSPADM

## 2025-07-03 RX ORDER — ENOXAPARIN SODIUM 100 MG/ML
40 INJECTION SUBCUTANEOUS 2 TIMES DAILY
Status: DISCONTINUED | OUTPATIENT
Start: 2025-07-04 | End: 2025-07-04 | Stop reason: HOSPADM

## 2025-07-03 RX ORDER — RANOLAZINE 500 MG/1
1000 TABLET, EXTENDED RELEASE ORAL 2 TIMES DAILY
Status: DISCONTINUED | OUTPATIENT
Start: 2025-07-03 | End: 2025-07-04 | Stop reason: HOSPADM

## 2025-07-03 RX ORDER — PROMETHAZINE HYDROCHLORIDE 25 MG/1
12.5-25 TABLET ORAL EVERY 4 HOURS PRN
Status: DISCONTINUED | OUTPATIENT
Start: 2025-07-03 | End: 2025-07-04 | Stop reason: HOSPADM

## 2025-07-03 RX ORDER — AMLODIPINE BESYLATE 5 MG/1
5 TABLET ORAL DAILY
Status: DISCONTINUED | OUTPATIENT
Start: 2025-07-04 | End: 2025-07-04 | Stop reason: HOSPADM

## 2025-07-03 RX ORDER — IPRATROPIUM BROMIDE AND ALBUTEROL SULFATE 2.5; .5 MG/3ML; MG/3ML
3 SOLUTION RESPIRATORY (INHALATION) EVERY 4 HOURS PRN
Status: DISCONTINUED | OUTPATIENT
Start: 2025-07-03 | End: 2025-07-04 | Stop reason: HOSPADM

## 2025-07-03 RX ORDER — SPIRONOLACTONE 25 MG/1
50 TABLET ORAL DAILY
Status: DISCONTINUED | OUTPATIENT
Start: 2025-07-04 | End: 2025-07-04 | Stop reason: HOSPADM

## 2025-07-03 RX ORDER — ONDANSETRON 4 MG/1
4 TABLET, ORALLY DISINTEGRATING ORAL EVERY 4 HOURS PRN
Status: DISCONTINUED | OUTPATIENT
Start: 2025-07-03 | End: 2025-07-04 | Stop reason: HOSPADM

## 2025-07-03 RX ORDER — LAMOTRIGINE 25 MG/1
50 TABLET ORAL 2 TIMES DAILY
Status: DISCONTINUED | OUTPATIENT
Start: 2025-07-03 | End: 2025-07-04 | Stop reason: HOSPADM

## 2025-07-03 RX ORDER — GABAPENTIN 400 MG/1
400 CAPSULE ORAL 3 TIMES DAILY
Status: DISCONTINUED | OUTPATIENT
Start: 2025-07-03 | End: 2025-07-04 | Stop reason: HOSPADM

## 2025-07-03 RX ORDER — CARVEDILOL 25 MG/1
25 TABLET ORAL 2 TIMES DAILY WITH MEALS
COMMUNITY

## 2025-07-03 RX ORDER — HYDROMORPHONE HYDROCHLORIDE 1 MG/ML
0.25 INJECTION, SOLUTION INTRAMUSCULAR; INTRAVENOUS; SUBCUTANEOUS
Status: DISCONTINUED | OUTPATIENT
Start: 2025-07-03 | End: 2025-07-04 | Stop reason: HOSPADM

## 2025-07-03 RX ADMIN — HYDROMORPHONE HYDROCHLORIDE 0.25 MG: 1 INJECTION, SOLUTION INTRAMUSCULAR; INTRAVENOUS; SUBCUTANEOUS at 22:59

## 2025-07-03 RX ADMIN — GABAPENTIN 400 MG: 400 CAPSULE ORAL at 21:42

## 2025-07-03 RX ADMIN — OXYCODONE 5 MG: 5 TABLET ORAL at 21:51

## 2025-07-03 RX ADMIN — ZIPRASIDONE HYDROCHLORIDE 40 MG: 40 CAPSULE ORAL at 21:42

## 2025-07-03 RX ADMIN — LAMOTRIGINE 50 MG: 25 TABLET ORAL at 21:42

## 2025-07-03 ASSESSMENT — HEART SCORE
AGE: <45
TROPONIN: LESS THAN OR EQUAL TO NORMAL LIMIT
RISK FACTORS: 1-2 RISK FACTORS
HISTORY: SLIGHTLY SUSPICIOUS
HEART SCORE: 1
ECG: NORMAL

## 2025-07-03 ASSESSMENT — SOCIAL DETERMINANTS OF HEALTH (SDOH)
WITHIN THE LAST YEAR, HAVE YOU BEEN HUMILIATED OR EMOTIONALLY ABUSED IN OTHER WAYS BY YOUR PARTNER OR EX-PARTNER?: NO
WITHIN THE LAST YEAR, HAVE YOU BEEN KICKED, HIT, SLAPPED, OR OTHERWISE PHYSICALLY HURT BY YOUR PARTNER OR EX-PARTNER?: NO
WITHIN THE LAST YEAR, HAVE YOU BEEN AFRAID OF YOUR PARTNER OR EX-PARTNER?: NO
WITHIN THE LAST YEAR, HAVE TO BEEN RAPED OR FORCED TO HAVE ANY KIND OF SEXUAL ACTIVITY BY YOUR PARTNER OR EX-PARTNER?: NO
IN THE PAST 12 MONTHS, HAS THE ELECTRIC, GAS, OIL, OR WATER COMPANY THREATENED TO SHUT OFF SERVICE IN YOUR HOME?: NO
WITHIN THE PAST 12 MONTHS, THE FOOD YOU BOUGHT JUST DIDN'T LAST AND YOU DIDN'T HAVE MONEY TO GET MORE: NEVER TRUE
WITHIN THE PAST 12 MONTHS, YOU WORRIED THAT YOUR FOOD WOULD RUN OUT BEFORE YOU GOT THE MONEY TO BUY MORE: NEVER TRUE

## 2025-07-03 ASSESSMENT — LIFESTYLE VARIABLES
TOTAL SCORE: 0
CONSUMPTION TOTAL: NEGATIVE
EVER FELT BAD OR GUILTY ABOUT YOUR DRINKING: NO
ON A TYPICAL DAY WHEN YOU DRINK ALCOHOL HOW MANY DRINKS DO YOU HAVE: 0
EVER HAD A DRINK FIRST THING IN THE MORNING TO STEADY YOUR NERVES TO GET RID OF A HANGOVER: NO
AVERAGE NUMBER OF DAYS PER WEEK YOU HAVE A DRINK CONTAINING ALCOHOL: 0
TOTAL SCORE: 0
HAVE PEOPLE ANNOYED YOU BY CRITICIZING YOUR DRINKING: NO
HOW MANY TIMES IN THE PAST YEAR HAVE YOU HAD 5 OR MORE DRINKS IN A DAY: 0
DOES PATIENT WANT TO STOP DRINKING: NO
HAVE YOU EVER FELT YOU SHOULD CUT DOWN ON YOUR DRINKING: NO
ALCOHOL_USE: NO
TOTAL SCORE: 0

## 2025-07-03 ASSESSMENT — FIBROSIS 4 INDEX
FIB4 SCORE: 0.61
FIB4 SCORE: 0.57

## 2025-07-03 ASSESSMENT — PAIN DESCRIPTION - PAIN TYPE
TYPE: ACUTE PAIN
TYPE: CHRONIC PAIN
TYPE: ACUTE PAIN

## 2025-07-03 NOTE — TELEPHONE ENCOUNTER
Call placed to patient to discuss recommendations per LB, advised patient once again it would be recommended to go to the ER, patient reports they are just going to yell at her because her troponins are normal. Advised the troponins are not the only thing they are going to be looking at, as patient is reporting multiple symptoms it would be recommended to present to the ER for further evaluation. Patient verbalized understanding.     Radha Noe D.N.P.     7/3/25  3:29 PM  Please have patient come in sooner than August to see Edwina in clinic.     Please reinforce ER precautions.

## 2025-07-03 NOTE — TELEPHONE ENCOUNTER
Call placed to patient to advise the note was received, advised this note should have been a phone call to the office to properly triage in a timely manner. Further advised if patient feeling this poorly should be seen and evaluated in the ER. Patient declined stating every time she goes to the ER she gets yelled at and told there is nothing wrong with her and the lab work is fine. Informed patient HL is OOO until 7/8, discussed last office visit recommendations. Patient reports she has never felt so poorly and was not able to reach the nitroglycerine as she felt so terrible, had someone had not come to the house she would not have been able to take the medication. Further reports after taking the nitroglycerine she only felt relief for approximately 3 minutes, she did not take a second dose. Patient did take all prescribed medications today. She has stopped the Carvedilol as advised on 7/1 visit.    LB- (ADA)would you be able to review the handwritten note in media as well as this information and advise further as HL is OOO, thank you

## 2025-07-03 NOTE — TELEPHONE ENCOUNTER
HL    PT'S GRANDMOTHER CAME IN WITH A NOTE REGARDING ALL OF THE SYMPTOMS THAT THE PT IS EXPERIENCING. SCANNED INTO MEDIA. PLEASE CALL WITH INSTRUCTIONS

## 2025-07-03 NOTE — ED TRIAGE NOTES
"Chief Complaint   Patient presents with    Sent by MD     Sent by cardiologist for low HR/palpitations/nausea/dizziness/weakness that began this morning, baseline chest pain midsternal, blood thinner ASA, rhinorrhea         Ambulated to triage for above complaint.     Past Medical History[1]    Chest pain protocols ordered. Pt brought to [facility] office for blood draw. Pt educated of triage process and possible wait times. Pt informed to contact staff if status changes or with any developing concerns, pt returned to lobby.     BP (!) 157/93   Pulse 62   Temp 36.1 °C (96.9 °F) (Temporal)   Resp 18   Ht 1.626 m (5' 4\")   Wt 114 kg (251 lb 5.2 oz)   SpO2 98%   BMI 43.14 kg/m²           [1]   Past Medical History:  Diagnosis Date    Abdominal pain     Anginal syndrome     Random chest pain especially with tachycardia    Apnea, sleep     Arthritis     ASTHMA     when around smoke    Back pain     Borderline personality disorder (HCC)     Chickenpox     Chronic UTI 09/18/2010    Daytime sleepiness     Dental disorder 03/08/2021    Infected tooth    Depression     Diabetes (HCC)     Diarrhea     Incontinece    Disorder of thyroid     Hashimoto's    Fatigue     Frequent headaches     Heart burn     History of falling     Inappropriate sinus tachycardia (HCC) 2016    Statusp post ablation by Dr. Kumar.    Migraine     Mitochondrial disease (HCC)     Multiple personality disorder (HCC)     Obesity     Pain     Back, 7/10    Painful joint     PCOS (polycystic ovarian syndrome)     Pneumonia 2012 12/2020    POTS (postural orthostatic tachycardia syndrome)     Psychosis (HCC)     Ringing in ears     Scoliosis     Shortness of breath     O2 as needed    Sinus tachycardia 10/31/2013    Sleep apnea     CPAP \"pulmonary doctor took me off mid year 2016\"    Snoring     Supraventricular tachycardia (HCC) 04/10/2019    Transverse myelitis (HCC)     2/8/22: Per pt: not anymore    Tuberculosis     Latent Tb 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 Received " treatment.    Urinary bladder disorder     Suprapubic cath. 2/8/22: Not anymore.     Urinary incontinence     Weakness     Wears glasses

## 2025-07-04 VITALS
WEIGHT: 252.43 LBS | RESPIRATION RATE: 18 BRPM | HEART RATE: 63 BPM | BODY MASS INDEX: 43.1 KG/M2 | SYSTOLIC BLOOD PRESSURE: 146 MMHG | DIASTOLIC BLOOD PRESSURE: 84 MMHG | TEMPERATURE: 96.6 F | OXYGEN SATURATION: 97 % | HEIGHT: 64 IN

## 2025-07-04 PROBLEM — R00.1 SYMPTOMATIC BRADYCARDIA: Status: RESOLVED | Noted: 2025-07-03 | Resolved: 2025-07-04

## 2025-07-04 PROBLEM — R42 DIZZINESS: Status: RESOLVED | Noted: 2025-07-03 | Resolved: 2025-07-04

## 2025-07-04 LAB
ANION GAP SERPL CALC-SCNC: 13 MMOL/L (ref 7–16)
BUN SERPL-MCNC: 13 MG/DL (ref 8–22)
CALCIUM SERPL-MCNC: 9.2 MG/DL (ref 8.5–10.5)
CHLORIDE SERPL-SCNC: 110 MMOL/L (ref 96–112)
CO2 SERPL-SCNC: 16 MMOL/L (ref 20–33)
CREAT SERPL-MCNC: 0.88 MG/DL (ref 0.5–1.4)
ERYTHROCYTE [DISTWIDTH] IN BLOOD BY AUTOMATED COUNT: 42.4 FL (ref 35.9–50)
GFR SERPLBLD CREATININE-BSD FMLA CKD-EPI: 87 ML/MIN/1.73 M 2
GLUCOSE SERPL-MCNC: 95 MG/DL (ref 65–99)
HCT VFR BLD AUTO: 40.7 % (ref 37–47)
HGB BLD-MCNC: 14 G/DL (ref 12–16)
MCH RBC QN AUTO: 32 PG (ref 27–33)
MCHC RBC AUTO-ENTMCNC: 34.4 G/DL (ref 32.2–35.5)
MCV RBC AUTO: 92.9 FL (ref 81.4–97.8)
PLATELET # BLD AUTO: 181 K/UL (ref 164–446)
PMV BLD AUTO: 11.8 FL (ref 9–12.9)
POTASSIUM SERPL-SCNC: 3.6 MMOL/L (ref 3.6–5.5)
RBC # BLD AUTO: 4.38 M/UL (ref 4.2–5.4)
SODIUM SERPL-SCNC: 139 MMOL/L (ref 135–145)
WBC # BLD AUTO: 6.1 K/UL (ref 4.8–10.8)

## 2025-07-04 PROCEDURE — 96376 TX/PRO/DX INJ SAME DRUG ADON: CPT

## 2025-07-04 PROCEDURE — A9270 NON-COVERED ITEM OR SERVICE: HCPCS | Mod: UD | Performed by: STUDENT IN AN ORGANIZED HEALTH CARE EDUCATION/TRAINING PROGRAM

## 2025-07-04 PROCEDURE — 700111 HCHG RX REV CODE 636 W/ 250 OVERRIDE (IP): Mod: JZ,UD | Performed by: STUDENT IN AN ORGANIZED HEALTH CARE EDUCATION/TRAINING PROGRAM

## 2025-07-04 PROCEDURE — 99239 HOSP IP/OBS DSCHRG MGMT >30: CPT | Mod: FS | Performed by: INTERNAL MEDICINE

## 2025-07-04 PROCEDURE — 96375 TX/PRO/DX INJ NEW DRUG ADDON: CPT

## 2025-07-04 PROCEDURE — 36415 COLL VENOUS BLD VENIPUNCTURE: CPT

## 2025-07-04 PROCEDURE — 80048 BASIC METABOLIC PNL TOTAL CA: CPT

## 2025-07-04 PROCEDURE — 700102 HCHG RX REV CODE 250 W/ 637 OVERRIDE(OP): Mod: UD | Performed by: STUDENT IN AN ORGANIZED HEALTH CARE EDUCATION/TRAINING PROGRAM

## 2025-07-04 PROCEDURE — 85027 COMPLETE CBC AUTOMATED: CPT

## 2025-07-04 PROCEDURE — 96372 THER/PROPH/DIAG INJ SC/IM: CPT

## 2025-07-04 PROCEDURE — G0378 HOSPITAL OBSERVATION PER HR: HCPCS

## 2025-07-04 RX ADMIN — ACETAMINOPHEN 650 MG: 325 TABLET ORAL at 07:45

## 2025-07-04 RX ADMIN — ASPIRIN 81 MG: 81 TABLET, COATED ORAL at 05:40

## 2025-07-04 RX ADMIN — ONDANSETRON 4 MG: 2 INJECTION INTRAMUSCULAR; INTRAVENOUS at 07:46

## 2025-07-04 RX ADMIN — AMLODIPINE BESYLATE 5 MG: 5 TABLET ORAL at 05:38

## 2025-07-04 RX ADMIN — OXYCODONE 5 MG: 5 TABLET ORAL at 05:38

## 2025-07-04 RX ADMIN — RANOLAZINE 1000 MG: 500 TABLET, EXTENDED RELEASE ORAL at 05:38

## 2025-07-04 RX ADMIN — ENOXAPARIN SODIUM 40 MG: 100 INJECTION SUBCUTANEOUS at 05:38

## 2025-07-04 RX ADMIN — HYDROMORPHONE HYDROCHLORIDE 0.25 MG: 1 INJECTION, SOLUTION INTRAMUSCULAR; INTRAVENOUS; SUBCUTANEOUS at 07:46

## 2025-07-04 RX ADMIN — GABAPENTIN 400 MG: 400 CAPSULE ORAL at 05:40

## 2025-07-04 RX ADMIN — LAMOTRIGINE 50 MG: 25 TABLET ORAL at 05:38

## 2025-07-04 RX ADMIN — ONDANSETRON 4 MG: 4 TABLET, ORALLY DISINTEGRATING ORAL at 10:26

## 2025-07-04 RX ADMIN — SPIRONOLACTONE 50 MG: 25 TABLET ORAL at 05:40

## 2025-07-04 ASSESSMENT — PAIN DESCRIPTION - PAIN TYPE
TYPE: ACUTE PAIN

## 2025-07-04 NOTE — PROGRESS NOTES
D/c instructions given to pt, educated on worsening s/s. Pt understands and questions answered. Mtm scheduled for 1117

## 2025-07-04 NOTE — ED NOTES
Medication history reviewed with patient at bedside.   Med rec is complete  Allergies reviewed.     Patient has not had any outpatient anti-MICROBIAL in the last 30 days.   Anticoagulants: No    Ulisses Mckeon

## 2025-07-04 NOTE — ASSESSMENT & PLAN NOTE
Recently switched to metoprolol 2 days ago, having episodes of bradycardia, symptomatic at home, recommend to come to the ER by cardiology  Troponin negative, EKG without any ischemic changes, had negative cardiac catheterization in February  Hold beta-blockers for now.  Hold ivrabadine due to known association with bradycardia  Telemetry monitoring  Continue home medications, cardiology consult in the morning

## 2025-07-04 NOTE — CARE PLAN
The patient is Stable - Low risk of patient condition declining or worsening    Shift Goals  Clinical Goals: tele monitoring, pain management  Patient Goals: pain management, rest  Family Goals: not present    Progress made toward(s) clinical / shift goals:    Problem: Pain - Standard  Goal: Alleviation of pain or a reduction in pain to the patient’s comfort goal  Description: Target End Date:  Prior to discharge or change in level of care    Document on Vitals flowsheet    1.  Document pain using the appropriate pain scale per order or unit policy  2.  Educate and implement non-pharmacologic comfort measures (i.e. relaxation, distraction, massage, cold/heat therapy, etc.)  3.  Pain management medications as ordered  4.  Reassess pain after pain med administration per policy  5.  If opiods administered assess patient's response to pain medication is appropriate per POSS sedation scale  6.  Follow pain management plan developed in collaboration with patient and interdisciplinary team (including palliative care or pain specialists if applicable)  Outcome: Progressing     Problem: Knowledge Deficit - Standard  Goal: Patient and family/care givers will demonstrate understanding of plan of care, disease process/condition, diagnostic tests and medications  Description: Target End Date:  1-3 days or as soon as patient condition allows    Document in Patient Education    1.  Patient and family/caregiver oriented to unit, equipment, visitation policy and means for communicating concern  2.  Complete/review Learning Assessment  3.  Assess knowledge level of disease process/condition, treatment plan, diagnostic tests and medications  4.  Explain disease process/condition, treatment plan, diagnostic tests and medications  Outcome: Progressing     Problem: Safety  Goal: Will remain free from injury  Outcome: Progressing  Goal: Will remain free from falls  Outcome: Progressing

## 2025-07-04 NOTE — DISCHARGE SUMMARY
Discharge Summary    CHIEF COMPLAINT ON ADMISSION  Chief Complaint   Patient presents with    Sent by MD     Sent by cardiologist for low HR/palpitations/nausea/dizziness/weakness that began this morning, baseline chest pain midsternal, blood thinner ASA, rhinorrhea        Reason for Admission  sent by MD     Admission Date  7/3/2025    CODE STATUS  Full Code    HPI & HOSPITAL COURSE    Ms. Kristin Balderrama is a 34 y.o. female who presented 7/3/2025 with dizziness.     Patient has a history of inappropriate sinus tachycardia, with history of ablation in 2016, morbid obesity, idiopathic intracranial hypertension, bladder incontinence status post sacral stimulator, chronic pain.  She has been following with cardiology.  She had cardiac catheterization in February after presenting for chest pain where she was noted to have no obstructive CAD.  She has been taking carvedilol 12.5 mg p.o. twice daily for her chronic coronary microvascular dysfunction, she has been feeling overall dizzy and lightheaded for the last few days.  She saw cardiology 2 days ago and was restarted on metoprolol 50 mg p.o. twice daily.     As patient has been at home, she continues to feel dizzy.  She continues to check her heart rate at home and was noted to be in the 40s on average.  She continues to feel nauseous, chest pain, cold, clammy extremities.  She spoke with cardiology today and informed them about her heart rate at home, and was recommended to come to the ER for further evaluation.     EKG shows NSR w/ no ischemic changes.  Initial troponin negative.  Patient admitted for monitoring of her bradycardia.  Patient admitted to hospital medicine for management of care.    During this hospitalization, patient's beta-blocker was held due to notable symptomatic bradycardia.  This helped patient's symptoms significantly with heart rate ranging in the high 50s to low 60s.  Patient seen and examined prior to being discharged.  Discussed with  patient holding off all beta-blockers along with nitroglycerin as patient had symptomatic bradycardia along with dizziness and headache.  Discussed with patient following up with cardiology posthospitalization and obtain direction whether patient need to restart beta-blockers and nitroglycerin.  Patient to resume all other home medications.  All questions and concerns answered prior to being discharged.  Patient discharged home.       Therefore, she is discharged in good and stable condition to home with close outpatient follow-up.    The patient recovered much more quickly than anticipated on admission.    Discharge Date  07/04/25      FOLLOW UP ITEMS POST DISCHARGE  Please call [phone] to schedule PCP appointment for patient.    Required specialty appointments include:       Discharge Instructions per Monica Almonte, A.P.R.N.    Follow-up with PCP s/p hospitalization  Make an appoint with cardiology as soon as possible  Hold off any beta-blockers (carvedilol, metoprolol, labetalol etc.)  Hold off use of nitroglycerin  Continue all other home medications  Check your blood pressure and heart rate twice daily    DIET: Cardiac    ACTIVITY: As tolerated    DIAGNOSIS: Symptomatic bradycardia    Return to ER if symptoms persist, chest pain, palpitations, shortness of breath, numbness, tingling, weakness, and high fevers.      DISCHARGE DIAGNOSES  Principal Problem (Resolved):    Dizziness (POA: Yes)  Active Problems:    Pain, chronic (POA: Unknown)    Mood disorder (HCC) (POA: Yes)    Morbid obesity (HCC) (POA: Yes)    Hypertension (POA: Yes)  Resolved Problems:    Symptomatic bradycardia (POA: Unknown)      FOLLOW UP  Future Appointments   Date Time Provider Department Center   7/8/2025  3:00 PM Glynn Justin PTA [facility] [address] [facility]   7/11/2025 11:00 AM Luis Antonio Krishna, PT [facility] [address] [facility]   7/15/2025  3:00 PM Irish Jerome PTA [facility] [address] [facility]   7/17/2025  3:00 PM Irish Jerome,  PTA [facility] [address] [facility]   8/8/2025 10:15 AM VANIA Flores [facility] None   8/14/2025  9:20 AM Fly Goodson M.D. [address]   8/20/2025  9:00 AM [id number] None   9/4/2025  2:30 PM Debby Wolf M.D. [facility] [address]   9/9/2025  8:00 AM Debby Wolf M.D. [facility] MICHAEL Goodson M.D.  [address] [facility]  [address]  [place] [address]  [phone]    Schedule an appointment as soon as possible for a visit in 1 week(s)      Sonja Mckenzie M.D.  [address]  [address]  [place] [address]  [phone]    Schedule an appointment as soon as possible for a visit in 1 week(s)        MEDICATIONS ON DISCHARGE     Medication List        PAUSE taking these medications        Instructions   carvedilol 25 MG Tabs  Wait to take this until: July 30, 2025  Commonly known as: Coreg   Take 25 mg by mouth 2 times a day with meals.  Dose: 25 mg     metoprolol SR 50 MG Tb24  Wait to take this until: July 30, 2025  Commonly known as: Toprol XL   Take 1 Tablet by mouth 2 times a day.  Dose: 50 mg     nitroglycerin 0.4 MG/SPRAY Soln  Wait to take this until: July 30, 2025  Commonly known as: Nitrolingual   Place 1 Spray under the tongue as needed (chest pains).  Dose: 1 Spray            CONTINUE taking these medications        Instructions   albuterol 108 (90 Base) MCG/ACT Aers inhalation aerosol   Inhale 1-2 Puffs every four hours as needed for Shortness of Breath.  Dose: 1-2 Puff     amLODIPine 5 MG Tabs  Commonly known as: Norvasc   Take 1 tablet by mouth every day.  Dose: 5 mg     aspirin 81 MG EC tablet   Take 1 Tablet by mouth every day.  Dose: 81 mg     Botox 200 units Solr injection  Generic drug: onabotulinum toxin A   Inject 200 Units as directed one time. Every 12 weeks  Dose: 200 Units     dicyclomine 10 MG Caps  Commonly known as: Bentyl   Take 1 Capsule by mouth 2 times a day as needed for abdominal cramping/discomfort  Dose: 10 mg     diphenhydrAMINE 25 MG  Tabs  Commonly known as: Benadryl   Take 50 mg by mouth at bedtime.  Dose: 50 mg     docusate sodium 250 MG capsule  Commonly known as: Colace   Take 250 mg by mouth 2 times a day.  Dose: 250 mg     gabapentin 400 MG Caps  Commonly known as: Neurontin   TAKE 3 CAPSULES BY MOUTH THREE TIMES DAILY.  FOR 30 DAY SUPPLY. DX: M79.7     isosorbide mononitrate SR 60 MG Tb24  Commonly known as: Imdur   Take 1 Tablet by mouth every morning.  Dose: 60 mg     ivabradine 7.5 MG Tabs tablet  Commonly known as: Corlanor   Take 1 Tablet by mouth 2 times a day with meals.  Dose: 7.5 mg     lamoTRIgine 25 MG Tabs  Commonly known as: LaMICtal   Take 2 Tablets by mouth 2 times a day.  Dose: 50 mg     Melatonin 10 MG Tabs   Take 10 mg by mouth at bedtime.  Dose: 10 mg     omeprazole 20 MG delayed-release capsule  Commonly known as: PriLOSEC   Take 2 Capsules by mouth 2 times a day.  Dose: 40 mg     ondansetron 4 MG Tbdp  Commonly known as: Zofran ODT   Dissolve 1 Tablet by mouth every four hours as needed for Nausea/Vomiting (give PO if no IV route available).  Dose: 4 mg     pyridostigmine 60 MG Tabs  Commonly known as: Mestinon   Take 1 Tablet by mouth 3 times a day.  Dose: 60 mg     Ranolazine 1000 MG Tb12   Take 1 Tablet by mouth 2 times a day.  Dose: 1,000 mg     spironolactone 50 MG Tabs  Commonly known as: Aldactone   Take 1 Tablet by mouth every day.  Dose: 50 mg     tizanidine 4 MG Tabs  Commonly known as: Zanaflex   Take 1 Tablet by mouth Three Times A Day as needed for muscle cramping and spasms     topiramate 50 MG tablet  Commonly known as: Topamax   Doctor's comments: THIS IS A 90 DAY SUPPLY  Take 1 Tablet by mouth 2 times a day.  Dose: 50 mg     ziprasidone 40 MG Caps  Commonly known as: Geodon   Take 1 capsule by mouth at bedtime.  Dose: 40 mg              Allergies  Allergies[1]    DIET  Orders Placed This Encounter   Procedures    Diet Order Diet: Regular     Standing Status:   Standing     Number of  "Occurrences:   1     Diet::   Regular [1]       ACTIVITY  As tolerated.  Weight bearing as tolerated    CONSULTATIONS  NONE    PROCEDURES  NONE    IMAGING  DX-CHEST-PORTABLE (1 VIEW)   Final Result      No acute cardiac or pulmonary abnormalities are identified.            LABORATORY  Lab Results   Component Value Date    SODIUM 139 07/04/2025    POTASSIUM 3.6 07/04/2025    CHLORIDE 110 07/04/2025    CO2 16 (L) 07/04/2025    GLUCOSE 95 07/04/2025    BUN 13 07/04/2025    CREATININE 0.88 07/04/2025    CREATININE 0.75 (L) 07/20/2010    GLOMRATE >59 07/20/2010        Lab Results   Component Value Date    WBC 6.1 07/04/2025    WBC 6.1 07/20/2010    HEMOGLOBIN 14.0 07/04/2025    HEMATOCRIT 40.7 07/04/2025    PLATELETCT 181 07/04/2025               [1]   Allergies  Allergen Reactions    Cefdinir Shortness of Breath and Itching     Tolerated 1/18/17  Tolerates ceftriaxone  Tolerated augmentin 8/2019     Depakote [Divalproex Sodium] Unspecified     Muscle spasms/muscle pain and weakness      Doxycycline Anaphylaxis and Vomiting     Other reaction(s): pustules/blisters  Other reaction(s): stomach pain    Montelukast [Singulair] Unspecified     Cardiac effusion    Vancomycin Itching     Pt becomes flushed in face and chest.   RXN=7/10/16    Wound Dressing Adhesive Rash     By pt report-\"removes skin totally off\"    Amitriptyline Unspecified     Headaches      Amoxicillin Rash      Tolerates augmentin    Aripiprazole [Abilify] Unspecified     Headaches/muscle twitching      Clindamycin Nausea         Other reaction(s): nausea stomach pain    Erythromycin Rash     .  Other reaction(s): nausea stomach pain    Flomax [Tamsulosin Hydrochloride] Swelling    Hydromorphone      Other reaction(s): vomiting    Metformin Unspecified     Increased lactic acid     Other reaction(s): itching and rash/nausea vomiting    Sulfa Drugs Hives, Itching, Myalgia and Unspecified     Muscle pain and weakness    Other reaction(s): unknown    " "Tamsulosin Swelling     Swelling of legs    Tape Rash     Tears skin off  coban with Tegaderm tape ok intermittently  RXN=ongoing    Sulfamethoxazole W-Trimethoprim Rash    Ciprofloxacin Rash          Keflex Rash     Pt states she gets a rash with this medication  Tolerates ceftriaxone  Can take with Benadryl    Levofloxacin Unspecified     Leg muscle cramps    Metronidazole Rash     \"Vision problems\"  Other reaction(s): vision problems     "

## 2025-07-04 NOTE — DISCHARGE INSTRUCTIONS
FOLLOW UP ITEMS POST DISCHARGE  Please call [phone] to schedule PCP appointment for patient.    Required specialty appointments include:       Discharge Instructions per PER Torres.    Follow-up with PCP s/p hospitalization  Make an appoint with cardiology as soon as possible  Hold off any beta-blockers (carvedilol, metoprolol, labetalol etc.)  Hold off use of nitroglycerin  Continue all other home medications  Check your blood pressure and heart rate twice daily    DIET: Cardiac    ACTIVITY: As tolerated    DIAGNOSIS: Symptomatic bradycardia    Return to ER if symptoms persist, chest pain, palpitations, shortness of breath, numbness, tingling, weakness, and high fevers.Bradycardia, Adult  Bradycardia is a slower-than-normal heartbeat. A normal resting heart rate for an adult ranges from 60 to 100 beats per minute. With bradycardia, the resting heart rate is less than 60 beats per minute.  Bradycardia can prevent enough oxygen from reaching certain areas of your body when you are active. It can be serious if it keeps enough oxygen from reaching your brain and other parts of your body. Bradycardia is not a problem for everyone. For some healthy adults, a slow resting heart rate is normal.  What are the causes?  This condition may be caused by:  A problem with the heart, including:  A problem with the heart's electrical system, such as a heart block. With a heart block, electrical signals between the chambers of the heart are partially or completely blocked, so they are not able to work as they should.  A problem with the heart's natural pacemaker (sinus node).  Heart disease.  A heart attack.  Heart damage.  Lyme disease.  A heart infection.  A heart condition that is present at birth (congenital heart defect).  Certain medicines that treat heart conditions.  Certain conditions, such as hypothyroidism and obstructive sleep apnea.  Problems with the balance of chemicals and other  substances, like potassium, in the blood.  Trauma.  Radiation therapy.  What increases the risk?  You are more likely to develop this condition if you:  Are age 65 or older.  Have high blood pressure (hypertension), high cholesterol (hyperlipidemia), or diabetes.  Drink heavily, use tobacco or nicotine products, or use drugs.  What are the signs or symptoms?  Symptoms of this condition include:  Light-headedness.  Feeling faint or fainting.  Fatigue and weakness.  Trouble with activity or exercise.  Shortness of breath.  Chest pain (angina).  Drowsiness.  Confusion.  Dizziness.  How is this diagnosed?  This condition may be diagnosed based on:  Your symptoms.  Your medical history.  A physical exam.  During the exam, your health care provider will listen to your heartbeat and check your pulse. To confirm the diagnosis, your health care provider may order tests, such as:  Blood tests.  An electrocardiogram (ECG). This test records the heart's electrical activity. The test can show how fast your heart is beating and whether the heartbeat is steady.  A test in which you wear a portable device (event recorder or Holter monitor) to record your heart's electrical activity while you go about your day.  An exercise test.  How is this treated?  Treatment for this condition depends on the cause of the condition and how severe your symptoms are. Treatment may involve:  Treatment of the underlying condition.  Changing your medicines or how much medicine you take.  Having a small, battery-operated device called a pacemaker implanted under the skin. When bradycardia occurs, this device can be used to increase your heart rate and help your heart beat in a regular rhythm.  Follow these instructions at home:  Lifestyle  Manage any health conditions that contribute to bradycardia as told by your health care provider.  Follow a heart-healthy diet. A nutrition specialist (dietitian) can help educate you about healthy food options and  changes.  Follow an exercise program that is approved by your health care provider.  Maintain a healthy weight.  Try to reduce or manage your stress, such as with yoga or meditation. If you need help reducing stress, ask your health care provider.  Do not use any products that contain nicotine or tobacco. These products include cigarettes, chewing tobacco, and vaping devices, such as e-cigarettes. If you need help quitting, ask your health care provider.  Do not use illegal drugs.  Alcohol use  If you drink alcohol:  Limit how much you have to:  0-1 drink a day for women who are not pregnant.  0-2 drinks a day for men.  Know how much alcohol is in a drink. In the [place] one drink equals one 12 oz bottle of beer (355 mL), one 5 oz glass of wine (148 mL), or one 1½ oz glass of hard liquor (44 mL).  General instructions  Take over-the-counter and prescription medicines only as told by your health care provider.  Keep all follow-up visits. This is important.  How is this prevented?  In some cases, bradycardia may be prevented by:  Treating underlying medical problems.  Stopping behaviors or medicines that can trigger the condition.  Contact a health care provider if:  You feel light-headed or dizzy.  You almost faint.  You feel weak or are easily fatigued during physical activity.  You experience confusion or have memory problems.  Get help right away if:  You faint.  You have chest pains or an irregular heartbeat (palpitations).  You have trouble breathing.  These symptoms may represent a serious problem that is an emergency. Do not wait to see if the symptoms will go away. Get medical help right away. Call your local emergency services (911 in the [place] Do not drive yourself to the hospital.  Summary  Bradycardia is a slower-than-normal heartbeat. With bradycardia, the resting heart rate is less than 60 beats per minute.  Treatment for this condition depends on the cause.  Manage any health conditions that contribute  to bradycardia as told by your health care provider.  Do not use any products that contain nicotine or tobacco. These products include cigarettes, chewing tobacco, and vaping devices, such as e-cigarettes.  Keep all follow-up visits. This is important.  This information is not intended to replace advice given to you by your health care provider. Make sure you discuss any questions you have with your health care provider.  Document Revised: 04/10/2022 Document Reviewed: 04/10/2022  [employer] Patient Education © 2023 [employer]

## 2025-07-04 NOTE — H&P
Hospital Medicine History & Physical Note    Date of Service  7/3/2025    Primary Care Physician  Fly Goodson M.D.    Consultants  None    Code Status  Full Code    Chief Complaint  Chief Complaint   Patient presents with    Sent by MD     Sent by cardiologist for low HR/palpitations/nausea/dizziness/weakness that began this morning, baseline chest pain midsternal, blood thinner ASA, rhinorrhea        History of Presenting Illness  Kristin Balderrama is a 34 y.o. female who presented 7/3/2025 with dizziness.    Patient has a history of inappropriate sinus tachycardia, with history of ablation in 2016, morbid obesity, idiopathic intracranial hypertension, bladder incontinence status post sacral stimulator, chronic pain.  She has been following with cardiology.  She had cardiac catheterization in February after presenting for chest pain where she was noted to have no obstructive CAD.  She has been taking carvedilol 12.5 mg p.o. twice daily for her chronic coronary microvascular dysfunction, she has been feeling overall dizzy and lightheaded for the last few days.  She saw cardiology 2 days ago and was restarted on metoprolol 50 mg p.o. twice daily.    As patient has been at home, she continues to feel dizzy.  She continues to check her heart rate at home and was noted to be in the 40s on average.  She continues to feel nauseous, chest pain, cold, clammy extremities.  She spoke with cardiology today and informed them about her heart rate at home, and was recommended to come to the ER for further evaluation.    EKG shows NSR w/ no ischemic changes.  Initial troponin negative.  Patient admitted for monitoring of her bradycardia.    I discussed the plan of care with patient.    Review of Systems  ROS    Past Medical History   has a past medical history of Abdominal pain, Anginal syndrome, Apnea, sleep, Arthritis, ASTHMA, Back pain, Borderline personality disorder (HCC), Chickenpox, Chronic UTI (09/18/2010),  Daytime sleepiness, Dental disorder (03/08/2021), Depression, Diabetes (HCC), Diarrhea, Disorder of thyroid, Fatigue, Frequent headaches, Heart burn, History of falling, Inappropriate sinus tachycardia (HCC) (2016), Migraine, Mitochondrial disease (HCC), Multiple personality disorder (HCC), Obesity, Pain, Painful joint, PCOS (polycystic ovarian syndrome), Pneumonia (2012 12/2020), POTS (postural orthostatic tachycardia syndrome), Psychosis ([facility]), Ringing in ears, Scoliosis, Shortness of breath, Sinus tachycardia (10/31/2013), Sleep apnea, Snoring, Supraventricular tachycardia (HCC) (04/10/2019), Transverse myelitis ([facility]), Tuberculosis, Urinary bladder disorder, Urinary incontinence, Weakness, and Wears glasses.    Surgical History   has a past surgical history that includes neuro dest facet l/s w/ig sngl (04/21/2015); recovery (01/27/2016); [place] by laparoscopy (08/29/2010); lumbar fusion anterior (08/21/2012); other cardiac surgery (01/2016); tonsillectomy; bowel stimulator insertion (07/13/2016); gastroscopy with balloon dilatation (N/A, 01/04/2017); muscle biopsy (Right, 01/26/2017); other abdominal surgery; laminotomy; bowel stimulator insertion (03/10/2021); pr lap,diagnostic abdomen (02/14/2022); ovarian cystectomy (Right, 02/14/2022); pr percut fix prox/neck femur fx (Left, 01/28/2023); laparoscopic inguinal hernia repair (Right, 07/21/2023); hernia repair (Right, 07/21/2023); pr cystoscopy,insert ureteral stent (Right, 2/12/2024); pr cysto/uretero/pyeloscopy, dx (Right, 2/12/2024); and lasertripsy (Right, 2/12/2024).     Family History  Family History   Problem Relation Age of Onset    Hypertension Mother     Sleep Apnea Mother     Heart Disease Mother     Other Mother         hypothryod    No Known Problems Sister     Other Sister         Narcolepsy;fibromyalsia;bone;nerve    Genitourinary () Problems Sister         endometriosis    Hypertension Maternal Uncle     Heart Disease Maternal Grandmother      Hypertension Maternal Grandmother     Cancer Neg Hx         Family history reviewed with patient. There is no family history that is pertinent to the chief complaint.     Social History   reports that she has never smoked. She has never been exposed to tobacco smoke. She has never used smokeless tobacco. She reports that she does not drink alcohol and does not use drugs.    Allergies  Allergies[1]    Medications  Prior to Admission Medications   Prescriptions Last Dose Informant Patient Reported? Taking?   BOTOX 200 units Recon Soln injection   Yes No   Sig: give 155 units IM per the FDA approved paradigm for treatment of chronic migraine q 12 weeks   Etonogestrel (NEXPLANON SC)  Patient Yes No   Sig: Inject 1 Application under the skin one time.   FIBER PO  Patient Yes No   Sig: Take 3 Tablets by mouth every day.   Galcanezumab-gnlm (EMGALITY) 120 MG/ML Solution Auto-injector  Patient No No   Sig: Inject 1 mL subcutaneously every month.   Patient not taking: Reported on 7/1/2025   Melatonin 10 MG Tab  Patient Yes No   Sig: Take 10 mg by mouth at bedtime.   Ranolazine 1000 MG TABLET SR 12 HR  Patient No No   Sig: Take 1 Tablet by mouth 2 times a day.   Rimegepant Sulfate (NURTEC) 75 MG TABLET DISPERSIBLE   No No   Sig: Take 1 tablet by mouth at onset of migraine or aura okay to repeat in 24 hours if needed.   Patient not taking: Reported on 7/1/2025   albuterol (PROVENTIL) 2.5mg/0.5ml Nebu Soln   Yes No   Sig: Take 2.5 mg by nebulization every four hours as needed for Shortness of Breath.   albuterol 108 (90 Base) MCG/ACT Aero Soln inhalation aerosol  Patient Yes No   Sig: Inhale 1-2 Puffs every four hours as needed for Shortness of Breath.   Patient not taking: Reported on 7/1/2025   amLODIPine (NORVASC) 5 MG Tab   No No   Sig: Take 1 tablet by mouth every day.   aspirin 81 MG EC tablet  Patient No No   Sig: Take 1 Tablet by mouth every day.   dicyclomine (BENTYL) 10 MG Cap  Patient No No   Sig: Take 1 Capsule by  mouth 2 times a day as needed for abdominal cramping/discomfort   diphenhydrAMINE (BENADRYL) 25 MG Tab  Patient Yes No   Sig: Take 50 mg by mouth at bedtime.   docusate sodium (COLACE) 250 MG capsule  Patient Yes No   Sig: Take 250 mg by mouth 2 times a day.   gabapentin (NEURONTIN) 400 MG Cap   No No   Sig: TAKE 3 CAPSULES BY MOUTH THREE TIMES DAILY.  FOR 30 DAY SUPPLY. DX: M79.7   gabapentin (NEURONTIN) 400 MG Cap   No No   Sig: TAKE 3 CAPSULES BY MOUTH THREE TIMES DAILY.   Patient not taking: Reported on 6/10/2025   gabapentin (NEURONTIN) 400 MG Cap   No No   Sig: TAKE 3 CAPSULES BY MOUTH THREE TIMES DAILY.  FOR 30 DAY SUPPLY. DX: M79.7   ipratropium-albuterol (COMBIVENT RESPIMAT)  MCG/ACT Aero Soln  Patient Yes No   Sig: Inhale 1 Puff 4 times a day as needed (Shortness of breath).   isosorbide mononitrate SR (IMDUR) 60 MG TABLET SR 24 HR   No No   Sig: Take 1 Tablet by mouth every morning.   ivabradine (CORLANOR) 7.5 MG Tab tablet   No No   Sig: Take 1 Tablet by mouth 2 times a day with meals.   lamoTRIgine (LAMICTAL) 25 MG Tab   No No   Sig: Take 2 Tablets by mouth 2 times a day.   lamoTRIgine (LAMICTAL) 25 MG Tab   Yes No   Sig: Take 50 mg by mouth 2 times a day.   methocarbamol (ROBAXIN) 500 MG Tab   No No   Sig: Take 1 Tablet by mouth 4 times a day.   Patient not taking: Reported on 7/1/2025   methylPREDNISolone (MEDROL DOSEPAK) 4 MG Tablet Therapy Pack   No No   Sig: Follow schedule on package instructions.   Patient not taking: Reported on 7/1/2025   metoprolol SR (TOPROL XL) 50 MG TABLET SR 24 HR   No No   Sig: Take 1 Tablet by mouth 2 times a day.   nitroglycerin (NITROLINGUAL) 0.4 MG/SPRAY Solution   No No   Sig: Place 1 Spray under the tongue as needed (chest pains).   omeprazole (PRILOSEC) 20 MG delayed-release capsule  Patient No No   Sig: Take 2 Capsules by mouth 2 times a day.   ondansetron (ZOFRAN ODT) 4 MG TABLET DISPERSIBLE   No No   Sig: Dissolve 1 Tablet by mouth every four  hours as needed for Nausea/Vomiting (give PO if no IV route available).   polyethylene glycol-electrolytes (GOLYTELY) 236 GM Recon Soln   No No   Sig: Follow [facility] instructions handout   pyridostigmine (MESTINON) 60 MG Tab   No No   Sig: Take 1 Tablet by mouth 3 times a day.   scopolamine (TRANSDERM SCOP, 1.5 MG,) 1 mg/72hr PATCH 72 HR  Patient No No   Sig: Place 1 Patch on the skin every 72 hours.   Patient taking differently: Place 1 Patch on the skin as needed.   spironolactone (ALDACTONE) 50 MG Tab  Patient No No   Sig: Take 1 Tablet by mouth every day.   sumatriptan (IMITREX) 20 MG/ACT nasal spray  Patient No No   Sig: Give 1 dose at onset headache okay to repeat in 2 hours if needed for max of 2 doses in a 24 hour timeframe.   Patient not taking: Reported on 7/1/2025   tizanidine (ZANAFLEX) 4 MG Tab   No No   Sig: Take 1 Tablet by mouth Three Times A Day as needed for muscle cramping and spasms   tizanidine (ZANAFLEX) 4 MG capsule   No No   Sig: Take 1 Capsule by mouth Three Times A Day as needed   topiramate (TOPAMAX) 50 MG tablet  Patient No No   Sig: Take 1 Tablet by mouth 2 times a day.   ziprasidone (GEODON) 40 MG Cap   No No   Sig: Take 1 capsule by mouth at bedtime.      Facility-Administered Medications: None       Physical Exam  Temp:  [36.1 °C (96.9 °F)] 36.1 °C (96.9 °F)  Pulse:  [62] 62  Resp:  [18] 18  BP: (157)/(93) 157/93  SpO2:  [98 %] 98 %  Blood Pressure: (!) 157/93   Temperature: 36.1 °C (96.9 °F)   Pulse: 62   Respiration: 18   Pulse Oximetry: 98 %       Physical Exam  Constitutional:       Appearance: Normal appearance. She is obese.   HENT:      Head: Normocephalic.      Nose: Nose normal.      Mouth/Throat:      Mouth: Mucous membranes are moist.      Pharynx: Oropharynx is clear.   Eyes:      Pupils: Pupils are equal, round, and reactive to light.   Cardiovascular:      Rate and Rhythm: Normal rate and regular rhythm.   Pulmonary:      Effort: Pulmonary effort is normal.       Breath sounds: Normal breath sounds.   Abdominal:      General: Abdomen is flat. Bowel sounds are normal.      Palpations: Abdomen is soft.   Musculoskeletal:         General: Normal range of motion.      Cervical back: Neck supple.   Skin:     General: Skin is warm.   Neurological:      General: No focal deficit present.      Mental Status: She is alert and oriented to person, place, and time. Mental status is at baseline.   Psychiatric:         Mood and Affect: Mood normal.         Behavior: Behavior normal.         Thought Content: Thought content normal.         Judgment: Judgment normal.         Laboratory:  Recent Labs     07/03/25  1725   WBC 6.3   RBC 4.28   HEMOGLOBIN 13.7   HEMATOCRIT 41.1   MCV 96.0   MCH 32.0   MCHC 33.3   RDW 43.8   PLATELETCT 192   MPV 11.7     Recent Labs     07/03/25  1725   SODIUM 140   POTASSIUM 4.0   CHLORIDE 112   CO2 16*   GLUCOSE 114*   BUN 15   CREATININE 0.89   CALCIUM 9.3     Recent Labs     07/03/25  1725   ALTSGPT 20   ASTSGOT 14   ALKPHOSPHAT 63   TBILIRUBIN 0.4   GLUCOSE 114*         Recent Labs     07/03/25  1725   NTPROBNP 207*         Recent Labs     07/03/25  1725   TROPONINT <6       Imaging:  DX-CHEST-PORTABLE (1 VIEW)   Final Result      No acute cardiac or pulmonary abnormalities are identified.          EKG:  I have personally reviewed the images and compared with prior images.    Assessment/Plan:  Justification for Admission Status  I anticipate this patient is appropriate for observation status at this time because pt has symptomatic bradycardia    Patient will need a Telemetry bed on EMERGENCY service .  The need is secondary to symptomatic bradycardia.    Symptomatic bradycardia  Assessment & Plan  Recently switched to metoprolol 2 days ago, having episodes of bradycardia, symptomatic at home, recommend to come to the ER by cardiology  Troponin negative, EKG without any ischemic changes, had negative cardiac catheterization in February Hold beta-blockers  "for now.  Hold ivrabadine due to known association with bradycardia  Telemetry monitoring  Continue home medications, cardiology consult in the morning    Hypertension- (present on admission)  Assessment & Plan  Resume home meds  Hold BB for now     Morbid obesity (HCC)- (present on admission)  Assessment & Plan  Morbidly obese 42     Mood disorder (HCC)- (present on admission)  Assessment & Plan  Continue home medications    Pain, chronic  Assessment & Plan  Continue home medications        VTE prophylaxis: enoxaparin ppx       [1]   Allergies  Allergen Reactions    Cefdinir Shortness of Breath and Itching     Tolerated 1/18/17  Tolerates ceftriaxone  Tolerated augmentin 8/2019     Depakote [Divalproex Sodium] Unspecified     Muscle spasms/muscle pain and weakness      Doxycycline Anaphylaxis and Vomiting     Other reaction(s): pustules/blisters  Other reaction(s): stomach pain    Montelukast [Singulair] Unspecified     Cardiac effusion    Vancomycin Itching     Pt becomes flushed in face and chest.   RXN=7/10/16    Wound Dressing Adhesive Rash     By pt report-\"removes skin totally off\"    Amitriptyline Unspecified     Headaches      Amoxicillin Rash      Tolerates augmentin    Aripiprazole [Abilify] Unspecified     Headaches/muscle twitching      Clindamycin Nausea         Other reaction(s): nausea stomach pain    Erythromycin Rash     .  Other reaction(s): nausea stomach pain    Flomax [Tamsulosin Hydrochloride] Swelling    Hydromorphone      Other reaction(s): vomiting    Levaquin Unspecified     Severe muscle cramps in legs  RXN=unknown  Other reaction(s): leg muscle cramps    Metformin Unspecified     Increased lactic acid     Other reaction(s): itching and rash/nausea vomiting    Sulfa Drugs Hives, Itching, Myalgia and Unspecified     Muscle pain and weakness    Other reaction(s): unknown    Tamsulosin Swelling     Swelling of legs    Tape Rash     Tears skin off  coban with Tegaderm tape ok " "intermittently  RXN=ongoing    Sulfamethoxazole W-Trimethoprim Rash    Ciprofloxacin Rash          Keflex Rash     Pt states she gets a rash with this medication  Tolerates ceftriaxone  Can take with Benadryl    Levofloxacin Unspecified     Leg muscle cramps    Metronidazole Rash     \"Vision problems\"  Other reaction(s): vision problems     "

## 2025-07-04 NOTE — DISCHARGE PLANNING
Case Management Discharge Planning    Admission Date: 7/3/2025  GMLOS:    ALOS: 0    6-Clicks ADL Score: 24  6-Clicks Mobility Score: 24    Anticipated Discharge Dispo: Discharge Disposition: Discharged to home/self care (01)    DME Needed: No    Action(s) Taken:   Per MD, pt is medically cleared for discharge home with close outpatient follow-up.     Pt requires transportation home at discharge and RN SARA confirmed pt has non-emergency transportation benefits available. Pt confirmed the discharge address on the face sheet is correct and she will have access inside her home upon arrival.     Transportation request was placed to [facility]; pending confirmation of scheduled  time.     Escalations Completed: None    Medically Clear: Yes    Next Steps:  SARA RN to follow up with medical team to discuss discharge barriers or needs.     Barriers to Discharge: Transportation

## 2025-07-04 NOTE — RESPIRATORY CARE
COPD EDUCATION by COPD CLINICAL EDUCATOR  7/4/2025 at 5:58 AM by Meagan Willams RRT     Patient reviewed by COPD education team. Patient does not have a history or diagnosis of COPD and is a non-smoker.  Therefore, patient does not qualify for the COPD program.

## 2025-07-04 NOTE — DISCHARGE PLANNING
DC Transport Scheduled    Transport Company Scheduled:  [employer]  Spoke with Sun at [facility] to schedule transport.  [facility] Trip #:[id number]    Scheduled Date: 7/4/2025  Scheduled Time: 1115    Transport Type: Self  Destination: home   Destination address: [address] [place] [address] [place] [id number]    Notified care team of scheduled transport via Voalte.     If there are any changes needed to the DC transportation scheduled, please contact [facility] at ext. [phone] between the hours of [phone]. If outside those hours, contact the ED Case Manager at ext. [phone].

## 2025-07-04 NOTE — CARE PLAN
The patient is Watcher - Medium risk of patient condition declining or worsening    Shift Goals  Clinical Goals: dc  Patient Goals: pain meds  Family Goals: joelle    Progress made toward(s) clinical / shift goals:    Problem: Pain - Standard  Goal: Alleviation of pain or a reduction in pain to the patient’s comfort goal  Outcome: Progressing       Patient is not progressing towards the following goals:

## 2025-07-04 NOTE — ED PROVIDER NOTES
ED Provider Note    CHIEF COMPLAINT  Chief Complaint   Patient presents with    Sent by MD     Sent by cardiologist for low HR/palpitations/nausea/dizziness/weakness that began this morning, baseline chest pain midsternal, blood thinner ASA, rhinorrhea        EXTERNAL RECORDS REVIEWED  Outpatient Notes patient was seen by cardiology at [facility] 7/1/2025 for microvascular angina and SVT follow-up she has a history of SVT with an ablation in 2016 she has POTS and she is on chronic beta-blocker therapy she has a symptom of sleep apnea idiopathic intracranial hypertension and normal pressure hydrocephalus and optic neuropathy chronic pain syndrome bladder incontinence and a sacral stimulator cervical neuralgia with leg weakness kidney stones hip fracture and psychiatric issues she had a heart catheterization with Dr. Bryant 2/17/2025 that did not show any obstructive coronary artery disease she told the nurse practitioner at cardiology office that her carvedilol was improving her chest pain but she has been having some low heart rates into the 30s and 40s and feeling dizzy she also is having migraines.    HPI/ROS  LIMITATION TO HISTORY   Select: : None  OUTSIDE HISTORIAN(S):  I reviewed the notes from the cardiology clinic where they recommended she come to the emergency department for further evaluation    Kristin Balderrama is a 34 y.o. female who presents complaining of low heart rate down into the 30s with dizziness weakness and near syncope.  She states that she has had this this morning and could barely get a hold of her nitroglycerin before she almost passed out.  She has had some rhinorrhea as well.  The patient states that she was on carvedilol but stopped that 2 days ago and changed her medication to metoprolol.  She states she had been on metoprolol in the past and it is never affected her heart rate but it is still been dipping down into the 30s.  She feels very weak and ill.  She denies any smoking drinking  or drug use.  She denies any fevers or chills cough or cold symptoms.    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    SURGICAL HISTORY   has a past surgical history that includes neuro dest facet l/s w/ig sngl (04/21/2015); recovery (01/27/2016); [place] by laparoscopy (08/29/2010); lumbar fusion anterior (08/21/2012); other cardiac surgery (01/2016); tonsillectomy; bowel stimulator insertion (07/13/2016); gastroscopy with balloon dilatation (N/A, 01/04/2017); muscle biopsy (Right, 01/26/2017); other abdominal surgery; laminotomy; bowel stimulator insertion (03/10/2021); lap,diagnostic abdomen (02/14/2022); ovarian cystectomy (Right, 02/14/2022); percut fix prox/neck femur fx (Left, 01/28/2023); laparoscopic inguinal hernia repair (Right, 07/21/2023); hernia repair (Right, 07/21/2023); cystoscopy,insert ureteral stent (Right, 2/12/2024); cysto/uretero/pyeloscopy, dx (Right, 2/12/2024); and lasertripsy (Right, 2/12/2024).    FAMILY HISTORY  Family History   Problem Relation Age of Onset    Hypertension Mother     Sleep Apnea Mother     Heart Disease  Mother     Other Mother         hypothryod    No Known Problems Sister     Other Sister         Narcolepsy;fibromyalsia;bone;nerve    Genitourinary () Problems Sister         endometriosis    Hypertension Maternal Uncle     Heart Disease Maternal Grandmother     Hypertension Maternal Grandmother     Cancer Neg Hx        SOCIAL HISTORY  Social History     Tobacco Use    Smoking status: Never     Passive exposure: Never    Smokeless tobacco: Never   Vaping Use    Vaping status: Never Used   Substance and Sexual Activity    Alcohol use: No     Alcohol/week: 0.0 oz    Drug use: Never    Sexual activity: Not Currently     Birth control/protection: Implant       CURRENT MEDICATIONS  Home Medications       Reviewed by Tami Pantoja R.N. (Registered Nurse) on 07/03/25 at 1654  Med List Status: Partial     Medication Last Dose Status   albuterol (PROVENTIL) 2.5mg/0.5ml Nebu Soln  Active   albuterol 108 (90 Base) MCG/ACT Aero Soln inhalation aerosol  Active   amLODIPine (NORVASC) 5 MG Tab  Active   aspirin 81 MG EC tablet  Active   BOTOX 200 units Recon Soln injection  Active   dicyclomine (BENTYL) 10 MG Cap  Active   diphenhydrAMINE (BENADRYL) 25 MG Tab  Active   docusate sodium (COLACE) 250 MG capsule  Active   Etonogestrel (NEXPLANON SC)  Active   FIBER PO  Active   gabapentin (NEURONTIN) 400 MG Cap  Active   gabapentin (NEURONTIN) 400 MG Cap  Active   gabapentin (NEURONTIN) 400 MG Cap  Active   Galcanezumab-gnlm (EMGALITY) 120 MG/ML Solution Auto-injector  Active   ipratropium-albuterol (COMBIVENT RESPIMAT)  MCG/ACT Aero Soln  Active   isosorbide mononitrate SR (IMDUR) 60 MG TABLET SR 24 HR  Active   ivabradine (CORLANOR) 7.5 MG Tab tablet  Active   lamoTRIgine (LAMICTAL) 25 MG Tab  Active   lamoTRIgine (LAMICTAL) 25 MG Tab  Active   Melatonin 10 MG Tab  Active   methocarbamol (ROBAXIN) 500 MG Tab  Active   methylPREDNISolone (MEDROL DOSEPAK) 4 MG Tablet Therapy Pack  Active   metoprolol SR (TOPROL XL) 50 MG  "TABLET SR 24 HR  Active   nitroglycerin (NITROLINGUAL) 0.4 MG/SPRAY Solution  Active   omeprazole (PRILOSEC) 20 MG delayed-release capsule  Active   ondansetron (ZOFRAN ODT) 4 MG TABLET DISPERSIBLE  Active   polyethylene glycol-electrolytes (GOLYTELY) 236 GM Recon Soln  Active   pyridostigmine (MESTINON) 60 MG Tab  Active   Ranolazine 1000 MG TABLET SR 12 HR  Active   Rimegepant Sulfate (NURTEC) 75 MG TABLET DISPERSIBLE  Active   scopolamine (TRANSDERM SCOP, 1.5 MG,) 1 mg/72hr PATCH 72 HR  Active   spironolactone (ALDACTONE) 50 MG Tab  Active   sumatriptan (IMITREX) 20 MG/ACT nasal spray  Active   tizanidine (ZANAFLEX) 4 MG capsule  Active   tizanidine (ZANAFLEX) 4 MG Tab  Active   topiramate (TOPAMAX) 50 MG tablet  Active   ziprasidone (GEODON) 40 MG Cap  Active                  Audit from Redirected Encounters    **Home medications have not yet been reviewed for this encounter**         ALLERGIES  Allergies[1]    PHYSICAL EXAM  VITAL SIGNS: BP (!) 157/93   Pulse 62   Temp 36.1 °C (96.9 °F) (Temporal)   Resp 18   Ht 1.626 m (5' 4\")   Wt 114 kg (251 lb 5.2 oz)   SpO2 98%   BMI 43.14 kg/m²      Constitutional: Well developed, Well nourished, No acute distress, Non-toxic appearance.   HEENT: Normocephalic, Atraumatic,  external ears normal, pharynx pink,  Mucous  Membranes moist, No rhinorrhea or mucosal edema  Eyes: PERRL, EOMI, Conjunctiva normal, No discharge.   Neck: Normal range of motion, No tenderness, Supple, No stridor.   Lymphatic: No lymphadenopathy    Cardiovascular: Regular Rate and Rhythm, No murmurs,  rubs, or gallops.   Thorax & Lungs: Lungs clear to auscultation bilaterally, No respiratory distress, No wheezes, rhales or rhonchi, No chest wall tenderness.   Abdomen: Bowel sounds normal, Soft, non tender, non distended,  No pulsatile masses., no rebound guarding or peritoneal signs.   Skin: Warm, Dry, No erythema, No rash,   Back:  No CVA tenderness,  No spinal tenderness, bony crepitance step " offs or instability.   Extremities: Equal, intact distal pulses, No cyanosis, clubbing or edema,  No tenderness.   Musculoskeletal: Good range of motion in all major joints. No tenderness to palpation or major deformities noted.   Neurologic: Alert & oriented No focal deficits noted.  Psychiatric: Affect normal, Judgment normal, Mood normal.      EKG/LABS  Results for orders placed or performed during the hospital encounter of 07/03/25   CBC with Differential    Collection Time: 07/03/25  5:25 PM   Result Value Ref Range    WBC 6.3 4.8 - 10.8 K/uL    RBC 4.28 4.20 - 5.40 M/uL    Hemoglobin 13.7 12.0 - 16.0 g/dL    Hematocrit 41.1 37.0 - 47.0 %    MCV 96.0 81.4 - 97.8 fL    MCH 32.0 27.0 - 33.0 pg    MCHC 33.3 32.2 - 35.5 g/dL    RDW 43.8 35.9 - 50.0 fL    Platelet Count 192 164 - 446 K/uL    MPV 11.7 9.0 - 12.9 fL    Neutrophils-Polys 62.80 44.00 - 72.00 %    Lymphocytes 25.90 22.00 - 41.00 %    Monocytes 7.90 0.00 - 13.40 %    Eosinophils 2.40 0.00 - 6.90 %    Basophils 0.50 0.00 - 1.80 %    Immature Granulocytes 0.50 0.00 - 0.90 %    Nucleated RBC 0.00 0.00 - 0.20 /100 WBC    Neutrophils (Absolute) 3.95 1.82 - 7.42 K/uL    Lymphs (Absolute) 1.63 1.00 - 4.80 K/uL    Monos (Absolute) 0.50 0.00 - 0.85 K/uL    Eos (Absolute) 0.15 0.00 - 0.51 K/uL    Baso (Absolute) 0.03 0.00 - 0.12 K/uL    Immature Granulocytes (abs) 0.03 0.00 - 0.11 K/uL    NRBC (Absolute) 0.00 K/uL   Complete Metabolic Panel (CMP)    Collection Time: 07/03/25  5:25 PM   Result Value Ref Range    Sodium 140 135 - 145 mmol/L    Potassium 4.0 3.6 - 5.5 mmol/L    Chloride 112 96 - 112 mmol/L    Co2 16 (L) 20 - 33 mmol/L    Anion Gap 12.0 7.0 - 16.0    Glucose 114 (H) 65 - 99 mg/dL    Bun 15 8 - 22 mg/dL    Creatinine 0.89 0.50 - 1.40 mg/dL    Calcium 9.3 8.5 - 10.5 mg/dL    Correct Calcium 9.0 8.5 - 10.5 mg/dL    AST(SGOT) 14 12 - 45 U/L    ALT(SGPT) 20 2 - 50 U/L    Alkaline Phosphatase 63 30 - 99 U/L    Total Bilirubin 0.4 0.1 - 1.5 mg/dL    Albumin  4.4 3.2 - 4.9 g/dL    Total Protein 6.6 6.0 - 8.2 g/dL    Globulin 2.2 1.9 - 3.5 g/dL    A-G Ratio 2.0 g/dL   proBrain Natriuretic Peptide, NT (BNP_    Collection Time: 25  5:25 PM   Result Value Ref Range    NT-proBNP 207 (H) 0 - 125 pg/mL   Troponins NOW    Collection Time: 25  5:25 PM   Result Value Ref Range    Troponin T <6 6 - 19 ng/L   HCG Qual Serum    Collection Time: 25  5:25 PM   Result Value Ref Range    Beta-Hcg Qualitative Serum Negative Negative   ESTIMATED GFR    Collection Time: 25  5:25 PM   Result Value Ref Range    GFR (CKD-EPI) 86 >60 mL/min/1.73 m 2   TSH    Collection Time: 25  5:25 PM   Result Value Ref Range    TSH 2.960 0.380 - 5.330 uIU/mL   EKG    Collection Time: 25  6:43 PM   Result Value Ref Range    Report       [facility] Emergency Dept.    Test Date:  2025  Pt Name:    HARLEY DE LA CRUZ              Department: ER  MRN:        [id number]                      Room:  Gender:     Female                       Technician: [id number]  :        [id number]                   Requested By:ER TRIAGE PROTOCOL  Order #:    [id number]                    Reading MD: YOUNG COBIAN MD    Measurements  Intervals                                Axis  Rate:       60                           P:          27  TX:         154                          QRS:        25  QRSD:       108                          T:          24  QT:         426  QTc:        426    Interpretive Statements  Sinus rhythm  Baseline wander in lead(s) III  Compared to ECG 2025 05:39:09  ST (T wave) deviation no longer present  Possible ischemia no longer present  Electronically Signed On 2025 18:43:43 PDT by YOUNG COBIAN MD       *Note: Due to a large number of results and/or encounters for the requested time period, some results have not been displayed. A complete set of results can be found in Results Review.       I have independently interpreted this  EKG    RADIOLOGY/PROCEDURES   I have independently interpreted the diagnostic imaging associated with this visit and am waiting the final reading from the radiologist.   My preliminary interpretation is as follows: cxr no infiltrate or pleural effusion    Radiologist interpretation:  DX-CHEST-PORTABLE (1 VIEW)   Final Result      No acute cardiac or pulmonary abnormalities are identified.          COURSE & MEDICAL DECISION MAKING    ASSESSMENT, COURSE AND PLAN  Care Narrative: Kristin Balderrama is a 34 y.o. female who presents complaining of low heart rate down into the 30s with dizziness weakness and near syncope.  She states that she has had this this morning and could barely get a hold of her nitroglycerin before she almost passed out.  She has had some rhinorrhea as well.  The patient states that she was on carvedilol but stopped that 2 days ago and changed her medication to metoprolol.  She states she had been on metoprolol in the past and it is never affected her heart rate but it is still been dipping down into the 30s.  She feels very weak and ill.  She denies any smoking drinking or drug use.  She denies any fevers or chills cough or cold symptoms.  On physical exam heart is regular rhythm no murmurs rubs or gallops lungs are clear auscultation bilaterally abdomen is soft she is forage motion of all extremities is mentating normally.    CHEST PAIN:   HEART Score for Major Cardiac Events  HEART Score     History: Slightly suspicious  ECG: Normal  Age: <45  Risk Factors: 1-2 risk factors  Troponin: Less than or equal to normal limit    Heart Score: 1    Total Score   0-3 Points = Low Score, risk of MACE 0.9-1.7%.  4-6 Points = Moderate Score, risk of MACE 12-16.6%  7-10 Points = High Score, risk of MACE 50-65%          ADDITIONAL PROBLEMS MANAGED      DISPOSITION AND DISCUSSIONS    Patient's white count is normal at 6.3 hemoglobin 13.7 plate count 192 with a normal differential.  Comprehensive metabolic  "panel is a slightly low CO2 at 16 glucose elevated at 114 electrolytes otherwise normal kidney function and liver function tests are normal.  Troponin is less than 6 BNP is 207 which is elevated from her previous.  TSH is 2.96 which is normal.  Pregnancy test is negative.  The patient's chest x-ray shows no cardiac or pulmonary abnormalities.  EKG shows normal sinus rhythm rate of 60 at this time no ischemic changes or ectopy.    Since the patient has had bradycardia with near syncope and is trying to get her blocker medications adjusted through cardiology we will admit her for further adjustment of her medications and cardiac monitoring.  I spoke with the hospitalist who admit her for further care.  I have discussed management of the patient with the following physicians and ARIES's: Hospitalist Dr. Baxter    Discussion of management with other Hospitals in [place] or appropriate source(s): None     Escalation of care considered, and ultimately not performed:none    Barriers to care at this time, including but not limited to: none.     Decision tools and prescription drugs considered including, but not limited to:  none.  Patient will be admitted in guarded condition  FINAL DIAGNOSIS  1. Bradycardia    2. Near syncope         Electronically signed by: Lynn Ward M.D., 7/3/2025 6:24 PM           [1]   Allergies  Allergen Reactions    Cefdinir Shortness of Breath and Itching     Tolerated 1/18/17  Tolerates ceftriaxone  Tolerated augmentin 8/2019     Depakote [Divalproex Sodium] Unspecified     Muscle spasms/muscle pain and weakness      Doxycycline Anaphylaxis and Vomiting     Other reaction(s): pustules/blisters  Other reaction(s): stomach pain    Montelukast [Singulair] Unspecified     Cardiac effusion    Vancomycin Itching     Pt becomes flushed in face and chest.   RXN=7/10/16    Wound Dressing Adhesive Rash     By pt report-\"removes skin totally off\"    Amitriptyline Unspecified     Headaches      Amoxicillin Rash      " "Tolerates augmentin    Aripiprazole [Abilify] Unspecified     Headaches/muscle twitching      Clindamycin Nausea         Other reaction(s): nausea stomach pain    Erythromycin Rash     .  Other reaction(s): nausea stomach pain    Flomax [Tamsulosin Hydrochloride] Swelling    Hydromorphone      Other reaction(s): vomiting    Levaquin Unspecified     Severe muscle cramps in legs  RXN=unknown  Other reaction(s): leg muscle cramps    Metformin Unspecified     Increased lactic acid     Other reaction(s): itching and rash/nausea vomiting    Sulfa Drugs Hives, Itching, Myalgia and Unspecified     Muscle pain and weakness    Other reaction(s): unknown    Tamsulosin Swelling     Swelling of legs    Tape Rash     Tears skin off  coban with Tegaderm tape ok intermittently  RXN=ongoing    Sulfamethoxazole W-Trimethoprim Rash    Ciprofloxacin Rash          Keflex Rash     Pt states she gets a rash with this medication  Tolerates ceftriaxone  Can take with Benadryl    Levofloxacin Unspecified     Leg muscle cramps    Metronidazole Rash     \"Vision problems\"  Other reaction(s): vision problems     "

## 2025-07-04 NOTE — PROGRESS NOTES
Monitor summary: SB/SR 54-66, RI 0.15, QRS 0.09, QT 0.36, with rare PVCs per strip from monitor room.

## 2025-07-04 NOTE — PROGRESS NOTES
4 Eyes Skin Assessment Completed by SONYA Urban and SONYA Martinez.    Skin assessment is primarily focused on high risk bony prominences. Pay special attention to skin beneath and around medical devices, high risk bony prominences, skin to skin areas and areas where the patient lacks sensation to feel pain and areas where the patient previously had breakdown.     Head (Occipital):  WDL   Ears (Under Medical Devices): WDL   Nose (Under Medical Devices): WDL   Mouth:  WDL   Neck: WDL   Breast/Chest:  WDL   Shoulder Blades:  WDL   Spine:   WDL   (R) Arm/Elbow/Hand: WDL   (L) Arm/Elbow/Hand: WDL   Abdomen: WDL   Pannus/Groin:  WDL   Sacrum/Coccyx:   WDL   (R) Ischial Tuberosity (Sit Bones):  WDL   (L) Ischial Tuberosity (Sit Bones):  WDL   (R) Leg:  WDL   (L) Leg:  WDL   (R) Heel:  WDL   (R) Foot/Toe: WDL   (L) Heel: WDL   (L) Foot/Toe:  WDL       DEVICES IN USE:   Respiratory Devices:  NA, patient on room air  Feeding Devices:  N/A   Lines & BP Monitoring Devices:  Peripheral IV    Orthopedic Devices:  N/A  Miscellaneous Devices:  Telemetry monitor    PROTOCOL INTERVENTIONS:   Standard/Trauma Bed:  Already in place    WOUND PHOTOS:   N/A no wounds identified    WOUND CONSULT:   N/A, no advanced wound care needs identified    Optimizing calorie and protein intake following your surgery will help you to heal quicker and regain strength.     Recommend eating a minimum of 3 meals per day and snacks as needed. If you have a poor appetite, aim to eat 4-6 smaller, protein-containing meals plus snacks throughout the day.      Good sources of protein include: lean meat, beans, eggs, poultry, fish, nuts, tofu, cottage cheese, milk, yogurt and any protein supplements.?   PLUS: for at least a week after surgery (1 month is optimal), recommend adding a protein supplement, such as Ensure Surgery, with at least 18 grams of protein 2 times daily to help preserve lean muscle mass and promote healing.       Note: If you follow a specialized diet prescribed by a dietitian or doctor, please follow the individualized recommendations provided to you.

## 2025-07-04 NOTE — HOSPITAL COURSE
Ms. Kristin Balderrama is a 34 y.o. female who presented 7/3/2025 with dizziness.     Patient has a history of inappropriate sinus tachycardia, with history of ablation in 2016, morbid obesity, idiopathic intracranial hypertension, bladder incontinence status post sacral stimulator, chronic pain.  She has been following with cardiology.  She had cardiac catheterization in February after presenting for chest pain where she was noted to have no obstructive CAD.  She has been taking carvedilol 12.5 mg p.o. twice daily for her chronic coronary microvascular dysfunction, she has been feeling overall dizzy and lightheaded for the last few days.  She saw cardiology 2 days ago and was restarted on metoprolol 50 mg p.o. twice daily.     As patient has been at home, she continues to feel dizzy.  She continues to check her heart rate at home and was noted to be in the 40s on average.  She continues to feel nauseous, chest pain, cold, clammy extremities.  She spoke with cardiology today and informed them about her heart rate at home, and was recommended to come to the ER for further evaluation.     EKG shows NSR w/ no ischemic changes.  Initial troponin negative.  Patient admitted for monitoring of her bradycardia.  Patient admitted to hospital medicine for management of care.    During this hospitalization, patient's beta-blocker was held due to notable symptomatic bradycardia.  This helped patient's symptoms significantly with heart rate ranging in the high 50s to low 60s.  Patient seen and examined prior to being discharged.  Discussed with patient holding off all beta-blockers along with nitroglycerin as patient had symptomatic bradycardia along with dizziness and headache.  Discussed with patient following up with cardiology posthospitalization and obtain direction whether patient need to restart beta-blockers and nitroglycerin.  Patient to resume all other home medications.  All questions and concerns answered prior to  being discharged.  Patient discharged home.

## 2025-07-07 ENCOUNTER — HOSPITAL ENCOUNTER (EMERGENCY)
Facility: MEDICAL CENTER | Age: 35
End: 2025-07-07
Attending: EMERGENCY MEDICINE
Payer: MEDICARE

## 2025-07-07 ENCOUNTER — APPOINTMENT (OUTPATIENT)
Dept: RADIOLOGY | Facility: MEDICAL CENTER | Age: 35
End: 2025-07-07
Attending: EMERGENCY MEDICINE
Payer: MEDICARE

## 2025-07-07 ENCOUNTER — PATIENT OUTREACH (OUTPATIENT)
Dept: MEDICAL GROUP | Facility: MEDICAL CENTER | Age: 35
End: 2025-07-07
Payer: MEDICARE

## 2025-07-07 VITALS
RESPIRATION RATE: 17 BRPM | HEART RATE: 66 BPM | WEIGHT: 252 LBS | BODY MASS INDEX: 43.02 KG/M2 | DIASTOLIC BLOOD PRESSURE: 88 MMHG | HEIGHT: 64 IN | TEMPERATURE: 97 F | SYSTOLIC BLOOD PRESSURE: 132 MMHG | OXYGEN SATURATION: 98 %

## 2025-07-07 DIAGNOSIS — R42 DIZZINESS: Primary | ICD-10-CM

## 2025-07-07 LAB
ALBUMIN SERPL BCP-MCNC: 4.3 G/DL (ref 3.2–4.9)
ALBUMIN/GLOB SERPL: 1.8 G/DL
ALP SERPL-CCNC: 67 U/L (ref 30–99)
ALT SERPL-CCNC: 37 U/L (ref 2–50)
ANION GAP SERPL CALC-SCNC: 12 MMOL/L (ref 7–16)
AST SERPL-CCNC: 18 U/L (ref 12–45)
BASOPHILS # BLD AUTO: 0 % (ref 0–1.8)
BASOPHILS # BLD: 0 K/UL (ref 0–0.12)
BILIRUB SERPL-MCNC: 0.4 MG/DL (ref 0.1–1.5)
BUN SERPL-MCNC: 11 MG/DL (ref 8–22)
CALCIUM ALBUM COR SERPL-MCNC: 8.9 MG/DL (ref 8.5–10.5)
CALCIUM SERPL-MCNC: 9.1 MG/DL (ref 8.5–10.5)
CHLORIDE SERPL-SCNC: 108 MMOL/L (ref 96–112)
CO2 SERPL-SCNC: 17 MMOL/L (ref 20–33)
COMMENT NL1176: NORMAL
CREAT SERPL-MCNC: 1.01 MG/DL (ref 0.5–1.4)
EKG IMPRESSION: NORMAL
EOSINOPHIL # BLD AUTO: 0 K/UL (ref 0–0.51)
EOSINOPHIL NFR BLD: 0 % (ref 0–6.9)
ERYTHROCYTE [DISTWIDTH] IN BLOOD BY AUTOMATED COUNT: 43 FL (ref 35.9–50)
GFR SERPLBLD CREATININE-BSD FMLA CKD-EPI: 74 ML/MIN/1.73 M 2
GLOBULIN SER CALC-MCNC: 2.4 G/DL (ref 1.9–3.5)
GLUCOSE SERPL-MCNC: 97 MG/DL (ref 65–99)
HCG SERPL QL: NEGATIVE
HCT VFR BLD AUTO: 41.3 % (ref 37–47)
HGB BLD-MCNC: 14.3 G/DL (ref 12–16)
LYMPHOCYTES # BLD AUTO: 0.97 K/UL (ref 1–4.8)
LYMPHOCYTES NFR BLD: 27 % (ref 22–41)
MANUAL DIFF BLD: NORMAL
MCH RBC QN AUTO: 32.4 PG (ref 27–33)
MCHC RBC AUTO-ENTMCNC: 34.6 G/DL (ref 32.2–35.5)
MCV RBC AUTO: 93.7 FL (ref 81.4–97.8)
MONOCYTES # BLD AUTO: 0.2 K/UL (ref 0–0.85)
MONOCYTES NFR BLD AUTO: 6.1 % (ref 0–13.4)
MORPHOLOGY BLD-IMP: NORMAL
NEUTROPHILS # BLD AUTO: 2.41 K/UL (ref 1.82–7.42)
NEUTROPHILS NFR BLD: 66.9 % (ref 44–72)
NRBC # BLD AUTO: 0 K/UL
NRBC BLD-RTO: 0 /100 WBC (ref 0–0.2)
NT-PROBNP SERPL IA-MCNC: 57 PG/ML (ref 0–125)
PLATELET # BLD AUTO: 161 K/UL (ref 164–446)
PLATELET BLD QL SMEAR: NORMAL
PMV BLD AUTO: 11.4 FL (ref 9–12.9)
POTASSIUM SERPL-SCNC: 3.8 MMOL/L (ref 3.6–5.5)
PROT SERPL-MCNC: 6.7 G/DL (ref 6–8.2)
RBC # BLD AUTO: 4.41 M/UL (ref 4.2–5.4)
RBC BLD AUTO: NORMAL
SODIUM SERPL-SCNC: 137 MMOL/L (ref 135–145)
TROPONIN T SERPL-MCNC: <6 NG/L (ref 6–19)
WBC # BLD AUTO: 3.6 K/UL (ref 4.8–10.8)

## 2025-07-07 PROCEDURE — 85027 COMPLETE CBC AUTOMATED: CPT

## 2025-07-07 PROCEDURE — 99284 EMERGENCY DEPT VISIT MOD MDM: CPT

## 2025-07-07 PROCEDURE — 93005 ELECTROCARDIOGRAM TRACING: CPT | Mod: TC

## 2025-07-07 PROCEDURE — 71045 X-RAY EXAM CHEST 1 VIEW: CPT

## 2025-07-07 PROCEDURE — 84484 ASSAY OF TROPONIN QUANT: CPT

## 2025-07-07 PROCEDURE — 85007 BL SMEAR W/DIFF WBC COUNT: CPT

## 2025-07-07 PROCEDURE — 84703 CHORIONIC GONADOTROPIN ASSAY: CPT

## 2025-07-07 PROCEDURE — 80053 COMPREHEN METABOLIC PANEL: CPT

## 2025-07-07 PROCEDURE — 36415 COLL VENOUS BLD VENIPUNCTURE: CPT

## 2025-07-07 PROCEDURE — 83880 ASSAY OF NATRIURETIC PEPTIDE: CPT

## 2025-07-07 PROCEDURE — 93005 ELECTROCARDIOGRAM TRACING: CPT | Mod: TC | Performed by: EMERGENCY MEDICINE

## 2025-07-07 ASSESSMENT — FIBROSIS 4 INDEX: FIB4 SCORE: 0.61

## 2025-07-07 NOTE — ED PROVIDER NOTES
"  ER Provider Note    Scribed for Floridalma Herrera M.d. by Juan Daniel Giles. 7/7/2025  3:24 PM    Primary Care Provider: Fly Goodson M.D.    CHIEF COMPLAINT  Chief Complaint   Patient presents with    Chest Pain     X 1 day with dizziness and SOB    N/V     Pt reports N/V and diarrhea x 3 days     LIMITATION TO HISTORY   None noted     HPI/ROS  OUTSIDE HISTORIAN(S):  Family present at bedside.     EXTERNAL RECORDS REVIEWED  Patient was admitted form 7/3-7/4. Patient has a history of diabetes PCOS, schizophrenia, Annetta-Danlos, hospitalized for symptomatic bradycardia. She had cardiac catheterization in February after presenting for chest pain where she was noted to have no obstructive CAD.     Kristin Balderrama is a 34 y.o. female who presents to the ED for bradycardia. Patient says she arrives today for \"persistent\" bradycardia \"its in the 48s at home.\" This has been going on for the past 3 days after her recent hospital discharge. Patient also reports concurrent including dizzy shortness of breath, weakness, and diarrhea and vomiting. Patient was informed to come back to the hospital for persistent bradycardia. Patient is adherent with her prescribed medications. Patient does not take beta blockers. Patient is followed by Dr Smith cardiology.    PAST MEDICAL HISTORY  Past Medical History[1]    SURGICAL HISTORY  Past Surgical History[2]    FAMILY HISTORY  Family History   Problem Relation Age of Onset    Hypertension Mother     Sleep Apnea Mother     Heart Disease Mother     Other Mother         hypothryod    No Known Problems Sister     Other Sister         Narcolepsy;fibromyalsia;bone;nerve    Genitourinary () Problems Sister         endometriosis    Hypertension Maternal Uncle     Heart Disease Maternal Grandmother     Hypertension Maternal Grandmother     Cancer Neg Hx        SOCIAL HISTORY   reports that she has never smoked. She has never been exposed to tobacco smoke. She has never used smokeless " tobacco. She reports that she does not drink alcohol and does not use drugs.    CURRENT MEDICATIONS  Discharge Medication List as of 7/7/2025  4:46 PM        CONTINUE these medications which have NOT CHANGED    Details   carvedilol (COREG) 25 MG Tab Take 25 mg by mouth 2 times a day with meals., Historical MedPaused since Fri 7/4/2025. Resumes on Wed 7/30/2025.      metoprolol SR (TOPROL XL) 50 MG TABLET SR 24 HR Take 1 Tablet by mouth 2 times a day., Disp-180 Tablet, R-3, NormalPaused since Fri 7/4/2025. Resumes on Wed 7/30/2025.      ziprasidone (GEODON) 40 MG Cap Take 1 capsule by mouth at bedtime., Disp-90 Capsule, R-1, Normal      lamoTRIgine (LAMICTAL) 25 MG Tab Take 2 Tablets by mouth 2 times a day., Disp-360 Tablet, R-1, Normal      tizanidine (ZANAFLEX) 4 MG Tab Take 1 Tablet by mouth Three Times A Day as needed for muscle cramping and spasms, Disp-90 Tablet, R-1, Normal      pyridostigmine (MESTINON) 60 MG Tab Take 1 Tablet by mouth 3 times a day., Disp-90 Tablet, R-3, Normal      BOTOX 200 units Recon Soln injection Inject 200 Units as directed one time. Every 12 weeks, SONYA, Historical Med      gabapentin (NEURONTIN) 400 MG Cap TAKE 3 CAPSULES BY MOUTH THREE TIMES DAILY.  FOR 30 DAY SUPPLY. DX: M79.7, Disp-270 Capsule, R-0, Normal      isosorbide mononitrate SR (IMDUR) 60 MG TABLET SR 24 HR Take 1 Tablet by mouth every morning., Disp-90 Tablet, R-3, Normal      ivabradine (CORLANOR) 7.5 MG Tab tablet Take 1 Tablet by mouth 2 times a day with meals., Disp-180 Tablet, R-3, Normal      amLODIPine (NORVASC) 5 MG Tab Take 1 tablet by mouth every day., Disp-90 Tablet, R-3, Normal      nitroglycerin (NITROLINGUAL) 0.4 MG/SPRAY Solution Place 1 Spray under the tongue as needed (chest pains)., Disp-12 g, R-11, NormalPaused since Fri 7/4/2025. Resumes on Wed 7/30/2025.      ondansetron (ZOFRAN ODT) 4 MG TABLET DISPERSIBLE Dissolve 1 Tablet by mouth every four hours as needed for Nausea/Vomiting (give PO if  "no IV route available)., Disp-10 Tablet, R-0, Normal      albuterol 108 (90 Base) MCG/ACT Aero Soln inhalation aerosol Inhale 1-2 Puffs every four hours as needed for Shortness of Breath., Historical Med      dicyclomine (BENTYL) 10 MG Cap Take 1 Capsule by mouth 2 times a day as needed for abdominal cramping/discomfort, Disp-60 Capsule, R-1, Normal      Ranolazine 1000 MG TABLET SR 12 HR Take 1 Tablet by mouth 2 times a day., Disp-180 Tablet, R-3, Normal      aspirin 81 MG EC tablet Take 1 Tablet by mouth every day., Disp-100 Tablet, R-3, Normal      docusate sodium (COLACE) 250 MG capsule Take 250 mg by mouth 2 times a day., Historical Med      spironolactone (ALDACTONE) 50 MG Tab Take 1 Tablet by mouth every day., Disp-90 Tablet, R-3, Normal      omeprazole (PRILOSEC) 20 MG delayed-release capsule Take 2 Capsules by mouth 2 times a day., Disp-180 Capsule, R-3, Normal      topiramate (TOPAMAX) 50 MG tablet Take 1 Tablet by mouth 2 times a day.THIS IS A 90 DAY SUPPLYDisp-180 Tablet, R-4, Normal      diphenhydrAMINE (BENADRYL) 25 MG Tab Take 50 mg by mouth at bedtime., Historical Med      Melatonin 10 MG Tab Take 10 mg by mouth at bedtime., Historical Med             ALLERGIES  Cefdinir, Depakote [divalproex sodium], Doxycycline, Montelukast [singulair], Vancomycin, Wound dressing adhesive, Amitriptyline, Amoxicillin, Aripiprazole [abilify], Clindamycin, Erythromycin, Flomax [tamsulosin hydrochloride], Hydromorphone, Metformin, Sulfa drugs, Tamsulosin, Tape, Sulfamethoxazole w-trimethoprim, Ciprofloxacin, Keflex, Levofloxacin, and Metronidazole    PHYSICAL EXAM  BP (!) 160/93   Pulse 67   Temp 36.4 °C (97.5 °F) (Temporal)   Resp 18   Ht 1.626 m (5' 4\")   Wt 114 kg (252 lb)   SpO2 97%   BMI 43.26 kg/m²   Constitutional: Alert in no apparent distress.  HENT: No signs of trauma, Bilateral external ears normal, Nose normal.   Neck: Normal range of motion, No tenderness, Supple, No stridor.   Cardiovascular: " Regular rate and rhythm, no murmurs.   Thorax & Lungs: Normal breath sounds, No respiratory distress, No wheezing, No chest tenderness.   Abdomen: Bowel sounds normal, Soft, No tenderness, No masses, No peritoneal signs.  Skin: Warm, Dry, No erythema, No rash.   Musculoskeletal:  No major deformities noted.  Neurologic: Alert, moving all extremities without difficulty, no focal deficits.         DIAGNOSTIC STUDIES & PROCEDURES    Labs:   Results for orders placed or performed during the hospital encounter of 07/07/25   CBC with Differential    Collection Time: 07/07/25  1:10 PM   Result Value Ref Range    WBC 3.6 (L) 4.8 - 10.8 K/uL    RBC 4.41 4.20 - 5.40 M/uL    Hemoglobin 14.3 12.0 - 16.0 g/dL    Hematocrit 41.3 37.0 - 47.0 %    MCV 93.7 81.4 - 97.8 fL    MCH 32.4 27.0 - 33.0 pg    MCHC 34.6 32.2 - 35.5 g/dL    RDW 43.0 35.9 - 50.0 fL    Platelet Count 161 (L) 164 - 446 K/uL    MPV 11.4 9.0 - 12.9 fL    Neutrophils-Polys 66.90 44.00 - 72.00 %    Lymphocytes 27.00 22.00 - 41.00 %    Monocytes 6.10 0.00 - 13.40 %    Eosinophils 0.00 0.00 - 6.90 %    Basophils 0.00 0.00 - 1.80 %    Nucleated RBC 0.00 0.00 - 0.20 /100 WBC    Neutrophils (Absolute) 2.41 1.82 - 7.42 K/uL    Lymphs (Absolute) 0.97 (L) 1.00 - 4.80 K/uL    Monos (Absolute) 0.20 0.00 - 0.85 K/uL    Eos (Absolute) 0.00 0.00 - 0.51 K/uL    Baso (Absolute) 0.00 0.00 - 0.12 K/uL    NRBC (Absolute) 0.00 K/uL   Complete Metabolic Panel (CMP)    Collection Time: 07/07/25  1:10 PM   Result Value Ref Range    Sodium 137 135 - 145 mmol/L    Potassium 3.8 3.6 - 5.5 mmol/L    Chloride 108 96 - 112 mmol/L    Co2 17 (L) 20 - 33 mmol/L    Anion Gap 12.0 7.0 - 16.0    Glucose 97 65 - 99 mg/dL    Bun 11 8 - 22 mg/dL    Creatinine 1.01 0.50 - 1.40 mg/dL    Calcium 9.1 8.5 - 10.5 mg/dL    Correct Calcium 8.9 8.5 - 10.5 mg/dL    AST(SGOT) 18 12 - 45 U/L    ALT(SGPT) 37 2 - 50 U/L    Alkaline Phosphatase 67 30 - 99 U/L    Total Bilirubin 0.4 0.1 - 1.5 mg/dL    Albumin 4.3 3.2  - 4.9 g/dL    Total Protein 6.7 6.0 - 8.2 g/dL    Globulin 2.4 1.9 - 3.5 g/dL    A-G Ratio 1.8 g/dL   proBrain Natriuretic Peptide, NT (BNP_    Collection Time: 25  1:10 PM   Result Value Ref Range    NT-proBNP 57 0 - 125 pg/mL   Troponins NOW    Collection Time: 25  1:10 PM   Result Value Ref Range    Troponin T <6 6 - 19 ng/L   HCG Qual Serum    Collection Time: 25  1:10 PM   Result Value Ref Range    Beta-Hcg Qualitative Serum Negative Negative   ESTIMATED GFR    Collection Time: 25  1:10 PM   Result Value Ref Range    GFR (CKD-EPI) 74 >60 mL/min/1.73 m 2   DIFFERENTIAL MANUAL    Collection Time: 25  1:10 PM   Result Value Ref Range    Manual Diff Status PERFORMED     Comment See Comment    PERIPHERAL SMEAR REVIEW    Collection Time: 25  1:10 PM   Result Value Ref Range    Peripheral Smear Review see below    PLATELET ESTIMATE    Collection Time: 25  1:10 PM   Result Value Ref Range    Plt Estimation Normal    MORPHOLOGY    Collection Time: 25  1:10 PM   Result Value Ref Range    RBC Morphology Normal    EKG    Collection Time: 25  3:50 PM   Result Value Ref Range    Report       [facility] Emergency Dept.    Test Date:  2025  Pt Name:    HARLEY DE LA CRUZ              Department: ER  MRN:        [id number]                      Room:  Gender:     Female                       Technician: [id number]  :        1990                   Requested By:ER TRIAGE PROTOCOL  Order #:    [id number]                    Reading MD: RICHIE SELLERS    Measurements  Intervals                                Axis  Rate:       57                           P:          24  LA:         132                          QRS:        23  QRSD:       99                           T:          2  QT:         465  QTc:        453    Interpretive Statements  Sinus bradycardia  Borderline T abnormalities, anterior leads  Compared to ECG 2025 17:21:39  T-wave abnormality  now present  Sinus rhythm no longer present  Impression: Sinus rhythm at 57 no obvious ischemia.  Electronically Signed On 07- 15:50:27 PDT by RICHIE SELLERS       *Note: Due to a large number of results and/or encounters for the requested time period, some results have not been displayed. A complete set of results can be found in Results Review.      All labs reviewed by me.    EKG:   I have independently interpreted this EKG as seen above.    Radiology:   The attending Emergency Physician has independently interpreted the diagnostic imaging associated with this visit and is awaiting the final reading from the radiologist, which will be displayed below.    Preliminary interpretation is a follows: normal appearing cxr  Radiologist interpretation:   DX-CHEST-PORTABLE (1 VIEW)   Final Result      No acute cardiopulmonary disease evident.           COURSE & MEDICAL DECISION MAKING    ED Observation Status? No; Patient does not meet criteria for ED Observation.     3:24 PM - Patient seen and evaluated at bedside. Patient will be treated as ordered for her symptoms. Will talk to patient's cardiologist Dr Smith. Ordered labs, imaging, and EKG to evaluate. She understands and agrees to the plan of care.     4:10 PM - I discussed the patient's case and the above findings with Dr. Jeff (cardiology) who says she has no concerns or recommendations.     4:15 PM - I reevaluated the patient at bedside. I discussed the patient's diagnostic study results. I discussed plan for discharge and follow up as outlined below. The patient is stable for discharge at this time and will return for any new or worsening symptoms. Patient verbalizes understanding and support with my plan for discharge.      INITIAL ASSESSMENT AND PLAN  Care Narrative: This is a 34-year-old female with a history of recent hospitalization for symptomatic bradycardia coming in with concerns for the same.  She is not bradycardic here in the ER her heart rate  is in the 60s to 70s.  She shows me readings from her watch that show her heart rate has gone down as low as 48 however the average is between 66 and 80.  I do think this is an adequate average heart rate range.  Her labs are totally normal including troponin electrolytes and CBC.  I do not see any evidence of volume overload or an acute cardiac event.  Unclear what her dizziness is related to however I do not think this is secondary to bradycardia at this time.  I did review this with Dr. Jeff, cardiology who agrees and recommends outpatient follow-up.  Therefore patient will be discharged.                     DISPOSITION AND DISCUSSIONS  I have discussed management of the patient with the following physicians and ARIES's: Dr. Jeff (cardiology)    Discussion of management with other Hospitals in [place] or appropriate source(s): None     Escalation of care considered, and ultimately not performed: acute inpatient care management, however at this time, the patient is most appropriate for outpatient management.    Barriers to care at this time, including but not limited to: none.     Decision tools and prescription drugs considered including, but not limited to: none.    The patient will return for new or worsening symptoms and is stable at the time of discharge. Patient was given return precautions. Patient and/or family member verbalizes understanding and will comply.    DISPOSITION:  Patient will be discharged home in stable condition.    FOLLOW UP:  Fly Goodson M.D.  [address] [place] [address]  [facility] [id number] [phone]          [facility], Emergency Dept  [address] [facility] [id number] [phone]    Return for worsening symptoms or other concerns      OUTPATIENT MEDICATIONS:  Discharge Medication List as of 7/7/2025  4:46 PM           FINAL IMPRESSION   1. Dizziness        I, Juan Daniel Giles (Scribe), am scribing for, and in the presence of, Floridalma Herrera M.D..    Electronically signed  "by: Juan Daniel Giles (Scribe), 7/7/2025    IFloridalma M.D. personally performed the services described in this documentation, as scribed by Juan Daniel Giles in my presence, and it is both accurate and complete.    The note accurately reflects work and decisions made by me.  Floridalma Herrera M.D.  7/7/2025  6:37 PM           [1]   Past Medical History:  Diagnosis Date    Abdominal pain     Anginal syndrome     Random chest pain especially with tachycardia    Apnea, sleep     Arthritis     ASTHMA     when around smoke    Back pain     Borderline personality disorder (HCC)     Chickenpox     Chronic UTI 09/18/2010    Daytime sleepiness     Dental disorder 03/08/2021    Infected tooth    Depression     Diabetes (HCC)     Diarrhea     Incontinece    Disorder of thyroid     Hashimoto's    Fatigue     Frequent headaches     Heart burn     History of falling     Inappropriate sinus tachycardia (HCC) 2016    Statusp post ablation by Dr. Kumar.    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    Urinary bladder disorder     Suprapubic cath. 2/8/22: Not anymore.     Urinary incontinence     Weakness     Wears glasses    [2]   Past Surgical History:  Procedure Laterality Date    ND CYSTOSCOPY,INSERT URETERAL STENT Right 2/12/2024    Procedure: CYSTOSCOPY, WITH RIGHT URETEROSCOPY, WITH LITHOTRIPSY, WITH INSERTION OF RIGHT URETERAL STENT;  Surgeon: " Josafat Roberson M.D.;  Location: [facility];  Service: Urology    UT CYSTO/URETERO/PYELOSCOPY, DX Right 2/12/2024    Procedure: URETEROSCOPY;  Surgeon: Josafat Roberson M.D.;  Location: [facility];  Service: Urology    LASERTRIPSY Right 2/12/2024    Procedure: LITHOTRIPSY, USING LASER;  Surgeon: Josafat Roberson M.D.;  Location: [facility];  Service: Urology    LAPAROSCOPIC INGUINAL HERNIA REPAIR Right 07/21/2023    Procedure: LAPAROSCOPIC RIGHT INGUINAL HERNIA REPAIR WITH MESH;  Surgeon: Joe Noyola M.D.;  Location: [facility];  Service: General    HERNIA REPAIR Right 07/21/2023    PB PERCUT FIX PBOX/NECK FEMUR FX Left 01/28/2023    Procedure: FIXATION, HIP, USING CANNULATED SCREW;  Surgeon: Noman Abdul M.D.;  Location: [facility];  Service: Orthopedics    UT LAP,DIAGNOSTIC ABDOMEN  02/14/2022    Procedure: LAPAROSCOPY;  Surgeon: Seamus Pisano M.D.;  Location: SURGERY SAME DAY [facility];  Service: Gynecology    OVARIAN CYSTECTOMY Right 02/14/2022    Procedure: EXCISION, CYST, OVARY;  Surgeon: Seamus Pisano M.D.;  Location: SURGERY SAME DAY [facility];  Service: Gynecology    BOWEL STIMULATOR INSERTION  03/10/2021    Procedure: INSERTION, ELECTRODE LEADS AND PULSE GENERATOR, NEUROSTIMULATOR, SACRAL - REMOVAL OF INTERSTIM WITH REPLACEMENT OF SACRAL NEUROMODULATION DEVICE;  Surgeon: Joe Noyola M.D.;  Location: [facility];  Service: General    MUSCLE BIOPSY Right 01/26/2017    Procedure: MUSCLE BIOPSY - THIGH;  Surgeon: Isidro Vigil M.D.;  Location: [facility];  Service:     GASTROSCOPY WITH BALLOON DILATATION N/A 01/04/2017    Procedure: GASTROSCOPY WITH DILATATION;  Surgeon: Torres Vargas M.D.;  Location: [facility];  Service:     BOWEL STIMULATOR INSERTION  07/13/2016    Procedure: BOWEL STIMULATOR INSERTION FOR PERMANENT INTERSTIM SACRAL IMPLANT;  Surgeon: Joe Noyola M.D.;  Location: [facility];   Service:     RECOVERY  01/27/2016    Procedure: CATH LAB EP STUDY WITH SINUS NODE MODIFICATION ABHINAV;  Surgeon: [facility];  Location: SURGERY PRE-POST PROC UNIT [facility];  Service:     OTHER CARDIAC SURGERY  01/2016    cardiac ablation    NEURO DEST FACET L/S W/IG SNGL  04/21/2015    Performed by Reza Tabor at SURGERY [facility]    LUMBAR FUSION ANTERIOR  08/21/2012    Performed by SUSIE SAWANT at SURGERY [facility] ORS    ALYSSA BY LAPAROSCOPY  08/29/2010    Performed by SHAYY JOHNS at SURGERY [facility] ORS    LAMINOTOMY      OTHER ABDOMINAL SURGERY      TONSILLECTOMY      tonsillectomy

## 2025-07-07 NOTE — ED TRIAGE NOTES
"Chief Complaint   Patient presents with    Chest Pain     X 1 day with dizziness and SOB    N/V     Pt reports N/V and diarrhea x 3 days       35 yo F to triage for above complaint. Pt was admitted last week for same, cardiology discontinued her home metoprolol, coreg and nitro due to episodes of bradycardia. Pt reports she has followed recommendations from cardiology but symptoms have returned.     CP protocol ordered.      Pt placed in lobby. Pt educated on triage process. Pt encouraged to alert staff for any changes.     Patient and staff wearing appropriate PPE    BP (!) 160/93   Pulse 67   Temp 36.4 °C (97.5 °F) (Temporal)   Resp 18   Ht 1.626 m (5' 4\")   Wt 114 kg (252 lb)   SpO2 97%   BMI 43.26 kg/m²     "

## 2025-07-07 NOTE — ED NOTES
Pt comfort check. Pt texting on phone does not appear to be in distress. Vss. Pt updated on need for another lab draw.

## 2025-07-08 NOTE — PROGRESS NOTES
Transitional Care Management  TCM Outreach Date and Time: Filed (7/7/2025  8:39 AM)    Discharge Questions  Actual Discharge Date: 07/04/25  Now that you are home, how are you feeling?: Good  Did you receive any new prescriptions?: No (PAUSE: carvedilol, metoprolol, nitroglycerin)  Do you have any questions about your current medications or new medications (Review Med Rec)?: No  Did you have any durable medical equipment ordered?: No  Do you have a follow up appointment scheduled with your PCP?: Yes  Appointment Date: 07/15/25  Appointment Time: 1700  Any issues or paperwork you wish to discuss with your PCP?: Yes (Please specify) (Still having HR's in the 40's occassionally)  Are you (patient) able to get to the appointment?: Yes  If Home Health was ordered, have they contacted you (Patient): Not Applicable  Did you have enough support after your last discharge?: Yes  Does this patient qualify for the CCM program?: No    Transitional Care  Number of attempts made to contact patient: 2  Current or previous attempts completed within two business days of discharge? : Yes  Provided education regarding treatment plan, medications, self-management, ADLs?: Yes (discussed d/c instructions)  Has patient completed an Advanced Directive?: Yes  Is the patient's advanced directive on file?: Yes  Has the Care Manager's phone number provided?: Yes  Is there anything else I can help you with?: No    Discharge Summary  Chief Complaint: Sent by MD        Sent by cardiologist for low HR/palpitations/nausea/dizziness/weakness that began this morning, baseline chest pain midsternal, blood thinner ASA, rhinorrhea  Admitting Diagnosis: bradycardia, dizziness  Discharge Diagnosis: Dizziness

## 2025-07-10 ENCOUNTER — HOSPITAL ENCOUNTER (OUTPATIENT)
Facility: MEDICAL CENTER | Age: 35
End: 2025-07-10
Attending: STUDENT IN AN ORGANIZED HEALTH CARE EDUCATION/TRAINING PROGRAM
Payer: MEDICARE

## 2025-07-10 ENCOUNTER — OFFICE VISIT (OUTPATIENT)
Dept: URGENT CARE | Facility: CLINIC | Age: 35
End: 2025-07-10
Payer: MEDICARE

## 2025-07-10 VITALS
TEMPERATURE: 98.2 F | SYSTOLIC BLOOD PRESSURE: 124 MMHG | HEIGHT: 64 IN | DIASTOLIC BLOOD PRESSURE: 80 MMHG | HEART RATE: 97 BPM | OXYGEN SATURATION: 96 % | WEIGHT: 252.8 LBS | RESPIRATION RATE: 20 BRPM | BODY MASS INDEX: 43.16 KG/M2

## 2025-07-10 DIAGNOSIS — N89.8 VAGINAL DISCHARGE: ICD-10-CM

## 2025-07-10 DIAGNOSIS — R39.9 UTI SYMPTOMS: ICD-10-CM

## 2025-07-10 LAB
APPEARANCE UR: NORMAL
BILIRUB UR STRIP-MCNC: NORMAL MG/DL
COLOR UR AUTO: NORMAL
GLUCOSE UR STRIP.AUTO-MCNC: NEGATIVE MG/DL
KETONES UR STRIP.AUTO-MCNC: NORMAL MG/DL
LEUKOCYTE ESTERASE UR QL STRIP.AUTO: NORMAL
NITRITE UR QL STRIP.AUTO: NEGATIVE
PH UR STRIP.AUTO: 5.5 [PH] (ref 5–8)
POCT INT CON NEG: NEGATIVE
POCT INT CON POS: POSITIVE
POCT URINE PREGNANCY TEST: NEGATIVE
PROT UR QL STRIP: 100 MG/DL
RBC UR QL AUTO: NORMAL
SP GR UR STRIP.AUTO: 1.03
UROBILINOGEN UR STRIP-MCNC: 1 MG/DL

## 2025-07-10 PROCEDURE — 87480 CANDIDA DNA DIR PROBE: CPT

## 2025-07-10 PROCEDURE — 87510 GARDNER VAG DNA DIR PROBE: CPT

## 2025-07-10 PROCEDURE — 3079F DIAST BP 80-89 MM HG: CPT | Performed by: STUDENT IN AN ORGANIZED HEALTH CARE EDUCATION/TRAINING PROGRAM

## 2025-07-10 PROCEDURE — 87086 URINE CULTURE/COLONY COUNT: CPT

## 2025-07-10 PROCEDURE — RXMED WILLOW AMBULATORY MEDICATION CHARGE: Performed by: INTERNAL MEDICINE

## 2025-07-10 PROCEDURE — 99213 OFFICE O/P EST LOW 20 MIN: CPT | Performed by: STUDENT IN AN ORGANIZED HEALTH CARE EDUCATION/TRAINING PROGRAM

## 2025-07-10 PROCEDURE — RXMED WILLOW AMBULATORY MEDICATION CHARGE: Performed by: SPECIALIST

## 2025-07-10 PROCEDURE — 87660 TRICHOMONAS VAGIN DIR PROBE: CPT

## 2025-07-10 PROCEDURE — RXMED WILLOW AMBULATORY MEDICATION CHARGE: Performed by: STUDENT IN AN ORGANIZED HEALTH CARE EDUCATION/TRAINING PROGRAM

## 2025-07-10 PROCEDURE — 81002 URINALYSIS NONAUTO W/O SCOPE: CPT | Performed by: STUDENT IN AN ORGANIZED HEALTH CARE EDUCATION/TRAINING PROGRAM

## 2025-07-10 PROCEDURE — 81025 URINE PREGNANCY TEST: CPT | Performed by: STUDENT IN AN ORGANIZED HEALTH CARE EDUCATION/TRAINING PROGRAM

## 2025-07-10 PROCEDURE — 3074F SYST BP LT 130 MM HG: CPT | Performed by: STUDENT IN AN ORGANIZED HEALTH CARE EDUCATION/TRAINING PROGRAM

## 2025-07-10 RX ORDER — NITROFURANTOIN 25; 75 MG/1; MG/1
100 CAPSULE ORAL 2 TIMES DAILY
Qty: 10 CAPSULE | Refills: 0 | Status: SHIPPED | OUTPATIENT
Start: 2025-07-10 | End: 2025-07-16

## 2025-07-10 ASSESSMENT — FIBROSIS 4 INDEX: FIB4 SCORE: 0.64

## 2025-07-10 NOTE — PROGRESS NOTES
"Subjective     Kristin Balderrama is a 34 y.o. female who presents with UTI (X2weeks Lower abdomen pressure/pain/bladder incontinence/very discomfort/vaginal mucus )            Kristin is a 34 y.o. female who presents to urgent care with concerns for UTI.  Patient states she has had lower abdominal pain/pressure as well as urinary frequency/urgency.  Patient has had bouts of bladder incontinence but states usually happens when she gets UTI.  She does report some vaginal discharge and odor as well.  No concern for STDs as she is not sexually active.  No flank pain.  No fever/chills.        Review of Systems   Constitutional:  Negative for chills and fever.   Gastrointestinal:  Negative for abdominal pain, constipation, diarrhea, nausea and vomiting.   Genitourinary:  Positive for dysuria, frequency and urgency. Negative for flank pain and hematuria.   All other systems reviewed and are negative.             Objective     /80   Pulse 97   Temp 36.8 °C (98.2 °F) (Temporal)   Resp 20   Ht 1.626 m (5' 4\")   Wt 115 kg (252 lb 12.8 oz)   SpO2 96%   BMI 43.39 kg/m²      Physical Exam  Vitals reviewed.   Constitutional:       Appearance: Normal appearance.   HENT:      Head: Normocephalic and atraumatic.      Nose: Nose normal.   Eyes:      Extraocular Movements: Extraocular movements intact.      Conjunctiva/sclera: Conjunctivae normal.      Pupils: Pupils are equal, round, and reactive to light.   Cardiovascular:      Rate and Rhythm: Normal rate.   Pulmonary:      Effort: Pulmonary effort is normal.   Abdominal:      General: Abdomen is flat. There is no distension.      Palpations: Abdomen is soft.      Tenderness: There is no abdominal tenderness. There is no right CVA tenderness, left CVA tenderness, guarding or rebound.   Skin:     General: Skin is warm and dry.   Neurological:      General: No focal deficit present.      Mental Status: She is alert and oriented to person, place, and time.             "                      Assessment & Plan  UTI symptoms    Orders:    POCT Urinalysis    URINE CULTURE(NEW); Future    POCT Pregnancy    nitrofurantoin (MACROBID) 100 MG Cap; Take 1 Capsule by mouth 2 times a day for 5 days.    Vaginal discharge    Orders:    VAGINAL PATHOGENS DNA PANEL; Future             Differential diagnoses, supportive care measures and indications for immediate follow-up discussed with patient. Pathogenesis of diagnosis discussed including typical length and natural progression.      Instructed to return to urgent care or nearest emergency department if symptoms fail to improve, for any change in condition, further concerns, or new concerning symptoms.    Patient states understanding and agrees with the plan of care and discharge instructions.

## 2025-07-11 ENCOUNTER — PHARMACY VISIT (OUTPATIENT)
Dept: PHARMACY | Facility: MEDICAL CENTER | Age: 35
End: 2025-07-11
Payer: COMMERCIAL

## 2025-07-12 ASSESSMENT — ENCOUNTER SYMPTOMS
FLANK PAIN: 0
ABDOMINAL PAIN: 0
VOMITING: 0
CONSTIPATION: 0
NAUSEA: 0
FEVER: 0
CHILLS: 0
DIARRHEA: 0

## 2025-07-13 LAB
BACTERIA UR CULT: NORMAL
SIGNIFICANT IND 70042: NORMAL
SITE SITE: NORMAL
SOURCE SOURCE: NORMAL

## 2025-07-15 ENCOUNTER — PHARMACY VISIT (OUTPATIENT)
Dept: PHARMACY | Facility: MEDICAL CENTER | Age: 35
End: 2025-07-15
Payer: COMMERCIAL

## 2025-07-15 ENCOUNTER — OFFICE VISIT (OUTPATIENT)
Dept: MEDICAL GROUP | Facility: MEDICAL CENTER | Age: 35
End: 2025-07-15
Payer: MEDICARE

## 2025-07-15 VITALS
DIASTOLIC BLOOD PRESSURE: 70 MMHG | TEMPERATURE: 98.1 F | HEART RATE: 70 BPM | HEIGHT: 64 IN | SYSTOLIC BLOOD PRESSURE: 118 MMHG | OXYGEN SATURATION: 98 % | WEIGHT: 251.1 LBS | BODY MASS INDEX: 42.87 KG/M2 | RESPIRATION RATE: 12 BRPM

## 2025-07-15 DIAGNOSIS — R00.1 BRADYCARDIA: Primary | ICD-10-CM

## 2025-07-15 DIAGNOSIS — I20.89 MICROVASCULAR ANGINA (HCC): ICD-10-CM

## 2025-07-15 DIAGNOSIS — I47.10 SVT (SUPRAVENTRICULAR TACHYCARDIA) (HCC): ICD-10-CM

## 2025-07-15 PROCEDURE — RXMED WILLOW AMBULATORY MEDICATION CHARGE: Performed by: OBSTETRICS & GYNECOLOGY

## 2025-07-15 RX ORDER — MICONAZOLE NITRATE 2 %
CREAM WITH APPLICATOR VAGINAL
Qty: 45 G | Refills: 5 | OUTPATIENT
Start: 2025-07-15

## 2025-07-15 RX ORDER — MICONAZOLE NITRATE 2 %
CREAM WITH APPLICATOR VAGINAL
Qty: 45 G | Refills: 0 | OUTPATIENT
Start: 2025-07-15

## 2025-07-15 ASSESSMENT — FIBROSIS 4 INDEX: FIB4 SCORE: 0.64

## 2025-07-15 NOTE — PROGRESS NOTES
Subjective:     Kristin Balderrama is a 34 y.o. female who presents for Hospital Follow-up.    HPI:   Recently hospitalized for     PMH   # Ehler Danos syndrome, hypermobile  # HTN  # vasospasm/microvascular angina-metoprolol (held), Imdur 60 mg, ranolazine 1 g twice daily, nitroglycerin (held)  # Restrictive lung disease (PFT 2019 wo significant bronchodilator response)  - has been on inhalers, with reported improvement  # migraine- ( rimegepant, emgality, topiramate, lamotrigine, sumatriptan),   # TONYA - intolerant of mask  # POTS/ SVT on ivabradine and metoprolol 25mg xr  # hidraadenitis supprativa on humira  # nexplanon  # chronic pain associated with EDS ( gabapentin, ibuprofen, acetaminophen),   # GERD/ gastroparesis related to EDS  # PCOS- spironolactone  # schizo on geodon  # hemorrhoids  # history of idiopathic intracranial hypertension  # hx of transverse myelitis vs functional neurologic disorder  # hx of optic neuritits previously on cellcept ([id number]) - no longer following with neuroophthalmology  # kapil 1:80 (2024), ccp 4 (2024), rheum factor <10 (2024)     Specialist  - cardiology - [facility]   - dermatology- [facility]  - on Humira for hidraadenitis   - gastroenterology - [facility], nafld, polyp, colonoscopy   - pain management/ neurology - [facility]  - neuromuscular neurology- [facility]  - ophthalmology- [facility] - EDS visual changes     Device  - interstim stimulator  - Hx of l4-l5 fusion      EDS monitoring/ work up  - 2022 Echocardiogram LVEF 55% - aortic root normal for BSA  - 2022 stress test   - follows with ophthalmology  - hx of spine surgery/ fusion   - CT cervical spine- No acute fracture or dislocation seen in the CT scan of the cervical spine.   - EGD- 2017 dysphagia, gastroparesis  - colonoscopy - 5 year recall 2024, due 2029     ------------------------------------------    Verbal consent was acquired by the patient to use [employer] ambient listening note generation during this visit  Yes   History of Present Illness  The patient is a 34-year-old female with a history of Annetta-Danlos syndrome (EDS) who presents for a post-hospital visit.    She was recently hospitalized due to a low heart rate, palpitations, nausea, dizziness, weakness, and chest pain. During her hospital stay, an EKG was performed, which did not indicate a heart attack. Her heart rate was in the 50s, and all lab results were unremarkable. A chest x-ray was also normal. Her beta blocker was discontinued, and she was monitored overnight. She was advised to follow up with cardiology and this office. On 07/07/2025, she returned to the ER with chest pain and a heart rate in the 48s. She has been experiencing persistent bradycardia for the past 3 days, along with dizziness. She has not started carvedilol, metoprolol, or nitroglycerin. Her next cardiology appointment is scheduled for August 2025 with the APRN, and she could not get in with the cardiologist until September 2025. She has a history of SVT, which led to the initiation of beta blocker therapy. She has been referred to [facility] due to her persistently low heart rate and severe chest pain over the past 2 days. She continues to experience chest pain and low heart rate. She reports that her heart rate increases when she is stressed or in pain but decreases when she is relaxed. She is currently on ranolazine, Imdur, Corlanor, and amlodipine. She has been on Corlanor for several years. She has been on the maximum dose of ever everything for 5 years without any issues. She is also taking pyridostigmine for gastroparesis, as recommended by her neurologist. She started pyridostigmine 2 to 3 weeks ago, but her symptoms began on 05/19/2025 when her heart rate dropped to 30. She reports that pyridostigmine is helping her stomach but not her heart. She has tried reducing her lamotrigine dosage but experienced a severe migraine.    She has been diagnosed with coronary  "microvascular disease with endothelial dysfunction due to hypermobile Annetta-Danlos syndrome. Two PET scans revealed significant abnormalities, including a lack of oxygen supply to part of her heart, likely due to vasospasms causing chest pain and brain fog. Her arteries are clear. Nitroglycerin has been effective during episodes of pain, but she has been advised against its use due to her low heart rate and suspected resistance.    Sleep: She reports difficulty sleeping, waking up in pain, and going to sleep in pain.        Current medicines (including reconciliation performed today)  Current Medications[1]    Allergies:   Cefdinir, Depakote [divalproex sodium], Doxycycline, Montelukast [singulair], Vancomycin, Wound dressing adhesive, Amitriptyline, Amoxicillin, Aripiprazole [abilify], Clindamycin, Erythromycin, Flomax [tamsulosin hydrochloride], Hydromorphone, Metformin, Sulfa drugs, Tamsulosin, Tape, Sulfamethoxazole w-trimethoprim, Ciprofloxacin, Keflex, Levofloxacin, and Metronidazole    Social History[2]    ROS:  See hpi    Objective:     Vitals:    07/15/25 1648   BP: 118/70   BP Location: Left arm   Patient Position: Sitting   BP Cuff Size: Large adult   Pulse: 70   Resp: 12   Temp: 36.7 °C (98.1 °F)   TempSrc: Temporal   SpO2: 98%   Weight: 114 kg (251 lb 1.7 oz)   Height: 1.626 m (5' 4\")     Body mass index is 43.1 kg/m².    Physical Exam:  Physical Exam  Constitutional:       Appearance: Normal appearance.   Cardiovascular:      Rate and Rhythm: Normal rate and regular rhythm.      Heart sounds: No murmur heard.  Pulmonary:      Effort: Pulmonary effort is normal.      Breath sounds: Normal breath sounds. No wheezing.   Musculoskeletal:      Cervical back: Normal range of motion and neck supple.   Neurological:      Mental Status: She is alert.          Assessment and Plan:     1. Bradycardia (Primary)  2. Microvascular angina (HCC)  3. SVT (supraventricular tachycardia) (HCC)  Post also visit for " bradycardia, dizziness, micro vascular angina.  Patient has a history of microvascular angina previously well-controlled on metoprolol, Imdur, nitroglycerin as needed, ranolazine higher recently has more episode of chest pain.  Additionally she reports symptoms around May she has been experience episode of bradycardia/dizziness.  During hospitalization metoprolol was held, nitroglycerin was held.  Patient not taking carvedilol.  Regarding bradycardia discussed with patient she is on multiple medications that can bradycardia including-metoprolol (held), ranolazine, ivabradine, pyridostigmine.  She may consider decreasing dose of pyridostigmine which was prescribed for gastroparesis.  Regarding her microvascular angina patient is currently on ranolazine, ivabradine, on calcium channel blocker amlodipine 5 mg daily on Imdur.  Beta blocker metoprolol was held due to bradycardia.  Nitroglycerin spray was held due to possible development of tolerance especially as patient seems to have developed microvascular angina that is refractory to beta-blocker and Imdur.  Per patient she is currently awaiting her appointment with HALEY Simon in December 2025  She has a follow-up with cardiology 8/2025      - Chart and discharge summary were reviewed.   - Hospitalization and results reviewed with patient.   - Medications reviewed including instructions regarding high risk medications, dosing and side effects.  - Recommended Services: No services needed at this time  - Advance directive/POLST on file?  No     Follow-up:Return in about 3 months (around 10/15/2025) for Lab review, Med check.                         [1]   Current Outpatient Medications   Medication Sig Dispense Refill    miconazole (MICONAZOLE 7) 2 % Cream apply 1 applicatorful at bedtime Vaginal weekly (Maintenance) 45 g 5    miconazole (MICONAZOLE 7) 2 % Cream apply 1 applicatorful at bedtime Vaginal Once a day 45 g 0    nitrofurantoin (MACROBID) 100 MG Cap Take 1  Capsule by mouth 2 times a day for 5 days. 10 Capsule 0    [Paused] carvedilol (COREG) 25 MG Tab Take 25 mg by mouth 2 times a day with meals.      [Paused] metoprolol SR (TOPROL XL) 50 MG TABLET SR 24 HR Take 1 Tablet by mouth 2 times a day. 180 Tablet 3    ziprasidone (GEODON) 40 MG Cap Take 1 capsule by mouth at bedtime. 90 Capsule 1    lamoTRIgine (LAMICTAL) 25 MG Tab Take 2 Tablets by mouth 2 times a day. 360 Tablet 1    tizanidine (ZANAFLEX) 4 MG Tab Take 1 Tablet by mouth Three Times A Day as needed for muscle cramping and spasms (Patient taking differently: Take 4 mg by mouth 3 times a day as needed (muscle spasms).) 90 Tablet 1    pyridostigmine (MESTINON) 60 MG Tab Take 1 Tablet by mouth 3 times a day. (Patient taking differently: Take 60 mg by mouth 2 times a day.) 90 Tablet 3    BOTOX 200 units Recon Soln injection Inject 200 Units as directed one time. Every 12 weeks      gabapentin (NEURONTIN) 400 MG Cap TAKE 3 CAPSULES BY MOUTH THREE TIMES DAILY.  FOR 30 DAY SUPPLY. DX: M79.7 270 Capsule 0    isosorbide mononitrate SR (IMDUR) 60 MG TABLET SR 24 HR Take 1 Tablet by mouth every morning. 90 Tablet 3    ivabradine (CORLANOR) 7.5 MG Tab tablet Take 1 Tablet by mouth 2 times a day with meals. 180 Tablet 3    amLODIPine (NORVASC) 5 MG Tab Take 1 tablet by mouth every day. 90 Tablet 3    [Paused] nitroglycerin (NITROLINGUAL) 0.4 MG/SPRAY Solution Place 1 Spray under the tongue as needed (chest pains). 12 g 11    ondansetron (ZOFRAN ODT) 4 MG TABLET DISPERSIBLE Dissolve 1 Tablet by mouth every four hours as needed for Nausea/Vomiting (give PO if no IV route available). 10 Tablet 0    albuterol 108 (90 Base) MCG/ACT Aero Soln inhalation aerosol Inhale 1-2 Puffs every four hours as needed for Shortness of Breath.      dicyclomine (BENTYL) 10 MG Cap Take 1 Capsule by mouth 2 times a day as needed for abdominal cramping/discomfort 60 Capsule 1    Ranolazine 1000 MG TABLET SR 12 HR Take 1 Tablet by mouth 2  times a day. 180 Tablet 3    aspirin 81 MG EC tablet Take 1 Tablet by mouth every day. 100 Tablet 3    docusate sodium (COLACE) 250 MG capsule Take 250 mg by mouth 2 times a day.      spironolactone (ALDACTONE) 50 MG Tab Take 1 Tablet by mouth every day. 90 Tablet 3    omeprazole (PRILOSEC) 20 MG delayed-release capsule Take 2 Capsules by mouth 2 times a day. 180 Capsule 3    topiramate (TOPAMAX) 50 MG tablet Take 1 Tablet by mouth 2 times a day. 180 Tablet 4    diphenhydrAMINE (BENADRYL) 25 MG Tab Take 50 mg by mouth at bedtime.      Melatonin 10 MG Tab Take 10 mg by mouth at bedtime.       No current facility-administered medications for this visit.   [2]   Social History  Tobacco Use    Smoking status: Never     Passive exposure: Never    Smokeless tobacco: Never   Vaping Use    Vaping status: Never Used   Substance Use Topics    Alcohol use: No     Alcohol/week: 0.0 oz    Drug use: Never

## 2025-07-16 ENCOUNTER — OFFICE VISIT (OUTPATIENT)
Dept: URGENT CARE | Facility: CLINIC | Age: 35
End: 2025-07-16
Payer: MEDICARE

## 2025-07-16 ENCOUNTER — HOSPITAL ENCOUNTER (OUTPATIENT)
Facility: MEDICAL CENTER | Age: 35
End: 2025-07-16
Payer: MEDICARE

## 2025-07-16 VITALS
DIASTOLIC BLOOD PRESSURE: 72 MMHG | OXYGEN SATURATION: 95 % | HEIGHT: 64 IN | TEMPERATURE: 97.2 F | SYSTOLIC BLOOD PRESSURE: 122 MMHG | WEIGHT: 251 LBS | BODY MASS INDEX: 42.85 KG/M2 | HEART RATE: 85 BPM | RESPIRATION RATE: 18 BRPM

## 2025-07-16 DIAGNOSIS — N89.8 VAGINAL DISCHARGE: Primary | ICD-10-CM

## 2025-07-16 DIAGNOSIS — R30.0 DYSURIA: ICD-10-CM

## 2025-07-16 DIAGNOSIS — R82.998 LEUKOCYTES IN URINE: ICD-10-CM

## 2025-07-16 DIAGNOSIS — N89.8 VAGINAL DISCHARGE: ICD-10-CM

## 2025-07-16 LAB
APPEARANCE UR: CLEAR
BILIRUB UR STRIP-MCNC: NEGATIVE MG/DL
COLOR UR AUTO: NORMAL
GLUCOSE UR STRIP.AUTO-MCNC: NEGATIVE MG/DL
KETONES UR STRIP.AUTO-MCNC: NEGATIVE MG/DL
LEUKOCYTE ESTERASE UR QL STRIP.AUTO: NORMAL
NITRITE UR QL STRIP.AUTO: NORMAL
PH UR STRIP.AUTO: 5.5 [PH] (ref 5–8)
PROT UR QL STRIP: NEGATIVE MG/DL
RBC UR QL AUTO: NEGATIVE
SP GR UR STRIP.AUTO: >=1.03
UROBILINOGEN UR STRIP-MCNC: NORMAL MG/DL

## 2025-07-16 PROCEDURE — 87798 DETECT AGENT NOS DNA AMP: CPT | Mod: 91

## 2025-07-16 PROCEDURE — 3074F SYST BP LT 130 MM HG: CPT

## 2025-07-16 PROCEDURE — 81002 URINALYSIS NONAUTO W/O SCOPE: CPT

## 2025-07-16 PROCEDURE — 3078F DIAST BP <80 MM HG: CPT

## 2025-07-16 PROCEDURE — RXMED WILLOW AMBULATORY MEDICATION CHARGE

## 2025-07-16 PROCEDURE — 99214 OFFICE O/P EST MOD 30 MIN: CPT

## 2025-07-16 PROCEDURE — 87563 M. GENITALIUM AMP PROBE: CPT

## 2025-07-16 RX ORDER — PHENAZOPYRIDINE HYDROCHLORIDE 200 MG/1
200 TABLET, FILM COATED ORAL 3 TIMES DAILY PRN
Qty: 10 TABLET | Refills: 0 | Status: SHIPPED | OUTPATIENT
Start: 2025-07-16

## 2025-07-16 ASSESSMENT — ENCOUNTER SYMPTOMS
ABDOMINAL PAIN: 0
NECK PAIN: 0
DIZZINESS: 0
CHILLS: 0
BACK PAIN: 0
HEARTBURN: 0
VOMITING: 0
FEVER: 0
NAUSEA: 0
FLANK PAIN: 0
MYALGIAS: 0

## 2025-07-16 ASSESSMENT — FIBROSIS 4 INDEX: FIB4 SCORE: 0.64

## 2025-07-17 ENCOUNTER — PHARMACY VISIT (OUTPATIENT)
Dept: PHARMACY | Facility: MEDICAL CENTER | Age: 35
End: 2025-07-17
Payer: COMMERCIAL

## 2025-07-17 NOTE — PROGRESS NOTES
"  Subjective:   CHIEF COMPLAINT  Chief Complaint   Patient presents with    UTI     Not improving since last visit and abx         Kristin Balderrama is a very pleasant 34 y.o. female who presents for UTI (Not improving since last visit and abx)      Patient presents with complaints of ongoing dysuria.  Patient was seen last week for similar symptoms had vaginal pathogen and UA performed both of which were negative.  Patient states since then she has been having vaginal discharge and itching.  States that she was treating herself with OTC antifungal cream for a yeast infection which has seemed to help slightly.  She denies any abdominal pain, nausea or vomiting, no fever chills or bodyaches.  She does state that she feels like she might have some flank pain and that symptoms are \"moving up.\"    UTI  Pertinent negatives include no abdominal pain, chills, fever, myalgias, nausea, neck pain or vomiting.       Review of Systems   Constitutional:  Negative for chills and fever.   Gastrointestinal:  Negative for abdominal pain, heartburn, nausea and vomiting.   Genitourinary:  Positive for dysuria, frequency and urgency. Negative for flank pain and hematuria.        Vaginal discharge   Musculoskeletal:  Negative for back pain, myalgias and neck pain.   Neurological:  Negative for dizziness.   All other systems reviewed and are negative.    Refer to HPI for additional details.    During this visit, appropriate PPE was worn, and hand hygiene was performed.    PMH:  has a past medical history of Abdominal pain, Anginal syndrome, Apnea, sleep, Arthritis, ASTHMA, Back pain, Borderline personality disorder ([facility]), Chickenpox, Chronic UTI (09/18/2010), Daytime sleepiness, Dental disorder (03/08/2021), Depression, Diabetes ([facility]), Diarrhea, Disorder of thyroid, Fatigue, Frequent headaches, Heart burn, History of falling, Inappropriate sinus tachycardia ([facility]) (2016), Migraine, Mitochondrial disease ([facility]), Multiple personality " "disorder (HCC), Obesity, Pain, Painful joint, PCOS (polycystic ovarian syndrome), Pneumonia (2012 12/2020), POTS (postural orthostatic tachycardia syndrome), Psychosis (HCC), Ringing in ears, Scoliosis, Shortness of breath, Sinus tachycardia (10/31/2013), Sleep apnea, Snoring, Supraventricular tachycardia (HCC) (04/10/2019), Transverse myelitis (HCC), Tuberculosis, Urinary bladder disorder, Urinary incontinence, Weakness, and Wears glasses.    MEDS: Current Medications[1]    ALLERGIES: Allergies[2]  SURGHX: Past Surgical History[3]  SOCHX:  reports that she has never smoked. She has never been exposed to tobacco smoke. She has never used smokeless tobacco. She reports that she does not drink alcohol and does not use drugs.    FH: Per HPI as applicable/pertinent.    Medications, Allergies, and current problem list reviewed today in Epic.     Objective:     /72   Pulse 85   Temp 36.2 °C (97.2 °F)   Resp 18   Ht 1.626 m (5' 4\")   Wt 114 kg (251 lb)   SpO2 95%     Physical Exam  Vitals reviewed.   Constitutional:       General: She is not in acute distress.     Appearance: She is not ill-appearing.   HENT:      Mouth/Throat:      Mouth: Mucous membranes are moist.      Pharynx: Oropharynx is clear.   Cardiovascular:      Rate and Rhythm: Normal rate.      Pulses: Normal pulses.   Pulmonary:      Effort: Pulmonary effort is normal.   Abdominal:      General: Abdomen is flat.      Tenderness: There is no abdominal tenderness. There is no right CVA tenderness, left CVA tenderness, guarding or rebound.   Genitourinary:     Comments: Deferred and not indicated  Skin:     General: Skin is warm and dry.   Neurological:      Mental Status: She is alert.         Assessment/Plan:     Diagnosis and associated orders:     1. Vaginal discharge  - POCT Urinalysis  - UROGENITAL UREAPLASMA/MYCOPLASMA, PCR; Future    2. Dysuria  - phenazopyridine (PYRIDIUM) 200 MG Tab; Take 1 Tablet by mouth 3 times a day as needed for Mild " Pain or Moderate Pain.  Dispense: 10 Tablet; Refill: 0    3. Leukocytes in urine     Comments/MDM:     I discussed HPI and physical exam with patient.  Overall well presenting, in clinic UA did show some leukocytes though no other abnormalities.  This point did recommend doing Ureaplasma/mycoplasma testing for thoroughness.  Given that patient's symptoms seem to be worsening and she is complaining of some flank pain I did recommend treatment for possible worsening upper UTI/pyelonephritis.  Unfortunately patient does have 22 documented allergies most of which include 1st, 2nd and 3rd line treatment for pyelo-.  At this point recommended strict monitoring until results come back.  Outpatient management will consist of Pyridium, hydration, loosefitting clothing, strict monitoring of symptoms  ED precautions discussed  Follow up in 3-5 days if no improvement in symptoms           Differential diagnosis, natural history, supportive care, and indications for immediate follow-up discussed.    Advised the patient to follow-up with the primary care physician for recheck, reevaluation, and consideration of further management.    Please note that this dictation was created using voice recognition software. I have made a reasonable attempt to correct obvious errors, but I expect that there are errors of grammar and possibly content that I did not discover before finalizing the note.    This note was electronically signed by VANIA Shore       [1]   Current Outpatient Medications:     phenazopyridine (PYRIDIUM) 200 MG Tab, Take 1 Tablet by mouth 3 times a day as needed for Mild Pain or Moderate Pain., Disp: 10 Tablet, Rfl: 0    miconazole (MICONAZOLE 7) 2 % Cream, apply 1 applicatorful at bedtime Vaginal weekly (Maintenance), Disp: 45 g, Rfl: 5    miconazole (MICONAZOLE 7) 2 % Cream, apply 1 applicatorful at bedtime Vaginal Once a day, Disp: 45 g, Rfl: 0    ziprasidone (GEODON) 40 MG Cap, Take 1 capsule by  mouth at bedtime., Disp: 90 Capsule, Rfl: 1    lamoTRIgine (LAMICTAL) 25 MG Tab, Take 2 Tablets by mouth 2 times a day., Disp: 360 Tablet, Rfl: 1    tizanidine (ZANAFLEX) 4 MG Tab, Take 1 Tablet by mouth Three Times A Day as needed for muscle cramping and spasms (Patient taking differently: Take 4 mg by mouth 3 times a day as needed (muscle spasms).), Disp: 90 Tablet, Rfl: 1    pyridostigmine (MESTINON) 60 MG Tab, Take 1 Tablet by mouth 3 times a day. (Patient taking differently: Take 60 mg by mouth 2 times a day.), Disp: 90 Tablet, Rfl: 3    gabapentin (NEURONTIN) 400 MG Cap, TAKE 3 CAPSULES BY MOUTH THREE TIMES DAILY.  FOR 30 DAY SUPPLY. DX: M79.7, Disp: 270 Capsule, Rfl: 0    isosorbide mononitrate SR (IMDUR) 60 MG TABLET SR 24 HR, Take 1 Tablet by mouth every morning., Disp: 90 Tablet, Rfl: 3    ivabradine (CORLANOR) 7.5 MG Tab tablet, Take 1 Tablet by mouth 2 times a day with meals., Disp: 180 Tablet, Rfl: 3    amLODIPine (NORVASC) 5 MG Tab, Take 1 tablet by mouth every day., Disp: 90 Tablet, Rfl: 3    ondansetron (ZOFRAN ODT) 4 MG TABLET DISPERSIBLE, Dissolve 1 Tablet by mouth every four hours as needed for Nausea/Vomiting (give PO if no IV route available)., Disp: 10 Tablet, Rfl: 0    albuterol 108 (90 Base) MCG/ACT Aero Soln inhalation aerosol, Inhale 1-2 Puffs every four hours as needed for Shortness of Breath., Disp: , Rfl:     dicyclomine (BENTYL) 10 MG Cap, Take 1 Capsule by mouth 2 times a day as needed for abdominal cramping/discomfort, Disp: 60 Capsule, Rfl: 1    Ranolazine 1000 MG TABLET SR 12 HR, Take 1 Tablet by mouth 2 times a day., Disp: 180 Tablet, Rfl: 3    aspirin 81 MG EC tablet, Take 1 Tablet by mouth every day., Disp: 100 Tablet, Rfl: 3    docusate sodium (COLACE) 250 MG capsule, Take 250 mg by mouth 2 times a day., Disp: , Rfl:     spironolactone (ALDACTONE) 50 MG Tab, Take 1 Tablet by mouth every day., Disp: 90 Tablet, Rfl: 3    omeprazole (PRILOSEC) 20 MG delayed-release capsule,  "Take 2 Capsules by mouth 2 times a day., Disp: 180 Capsule, Rfl: 3    topiramate (TOPAMAX) 50 MG tablet, Take 1 Tablet by mouth 2 times a day., Disp: 180 Tablet, Rfl: 4    diphenhydrAMINE (BENADRYL) 25 MG Tab, Take 50 mg by mouth at bedtime., Disp: , Rfl:     Melatonin 10 MG Tab, Take 10 mg by mouth at bedtime., Disp: , Rfl:     nitrofurantoin (MACROBID) 100 MG Cap, Take 1 Capsule by mouth 2 times a day for 5 days. (Patient not taking: Reported on 7/16/2025), Disp: 10 Capsule, Rfl: 0    [Paused] carvedilol (COREG) 25 MG Tab, Take 25 mg by mouth 2 times a day with meals., Disp: , Rfl:     [Paused] metoprolol SR (TOPROL XL) 50 MG TABLET SR 24 HR, Take 1 Tablet by mouth 2 times a day., Disp: 180 Tablet, Rfl: 3    BOTOX 200 units Recon Soln injection, Inject 200 Units as directed one time. Every 12 weeks, Disp: , Rfl:     [Paused] nitroglycerin (NITROLINGUAL) 0.4 MG/SPRAY Solution, Place 1 Spray under the tongue as needed (chest pains)., Disp: 12 g, Rfl: 11  [2]   Allergies  Allergen Reactions    Cefdinir Shortness of Breath and Itching     Tolerated 1/18/17  Tolerates ceftriaxone  Tolerated augmentin 8/2019     Depakote [Divalproex Sodium] Unspecified     Muscle spasms/muscle pain and weakness      Doxycycline Anaphylaxis and Vomiting     Other reaction(s): pustules/blisters  Other reaction(s): stomach pain    Montelukast [Singulair] Unspecified     Cardiac effusion    Vancomycin Itching     Pt becomes flushed in face and chest.   RXN=7/10/16    Wound Dressing Adhesive Rash     By pt report-\"removes skin totally off\"    Amitriptyline Unspecified     Headaches      Amoxicillin Rash      Tolerates augmentin    Aripiprazole [Abilify] Unspecified     Headaches/muscle twitching      Clindamycin Nausea         Other reaction(s): nausea stomach pain    Erythromycin Rash     .  Other reaction(s): nausea stomach pain    Flomax [Tamsulosin Hydrochloride] Swelling    Hydromorphone      Other reaction(s): vomiting    Metformin " "Unspecified     Increased lactic acid     Other reaction(s): itching and rash/nausea vomiting    Sulfa Drugs Hives, Itching, Myalgia and Unspecified     Muscle pain and weakness    Other reaction(s): unknown    Tamsulosin Swelling     Swelling of legs    Tape Rash     Tears skin off  coban with Tegaderm tape ok intermittently  RXN=ongoing    Sulfamethoxazole W-Trimethoprim Rash    Ciprofloxacin Rash          Keflex Rash     Pt states she gets a rash with this medication  Tolerates ceftriaxone  Can take with Benadryl    Levofloxacin Unspecified     Leg muscle cramps    Metronidazole Rash     \"Vision problems\"  Other reaction(s): vision problems   [3]   Past Surgical History:  Procedure Laterality Date    CT CYSTOSCOPY,INSERT URETERAL STENT Right 2/12/2024    Procedure: CYSTOSCOPY, WITH RIGHT URETEROSCOPY, WITH LITHOTRIPSY, WITH INSERTION OF RIGHT URETERAL STENT;  Surgeon: Josafat Roberson M.D.;  Location: [facility];  Service: Urology    CT CYSTO/URETERO/PYELOSCOPY, DX Right 2/12/2024    Procedure: URETEROSCOPY;  Surgeon: Josafat Roberson M.D.;  Location: [facility];  Service: Urology    LASERTRIPSY Right 2/12/2024    Procedure: LITHOTRIPSY, USING LASER;  Surgeon: Josafat Roberson M.D.;  Location: SURGERY [facility];  Service: Urology    LAPAROSCOPIC INGUINAL HERNIA REPAIR Right 07/21/2023    Procedure: LAPAROSCOPIC RIGHT INGUINAL HERNIA REPAIR WITH MESH;  Surgeon: Joe Noyola M.D.;  Location: SURGERY [facility];  Service: General    HERNIA REPAIR Right 07/21/2023    PB PERCUT FIX PBOX/NECK FEMUR FX Left 01/28/2023    Procedure: FIXATION, HIP, USING CANNULATED SCREW;  Surgeon: Noman Abdul M.D.;  Location: SURGERY [facility];  Service: Orthopedics    CT LAP,DIAGNOSTIC ABDOMEN  02/14/2022    Procedure: LAPAROSCOPY;  Surgeon: Seamus Pisano M.D.;  Location: SURGERY SAME DAY [facility];  Service: Gynecology    OVARIAN CYSTECTOMY Right 02/14/2022    Procedure: EXCISION, CYST, OVARY;  Surgeon: " Seamus Pisano M.D.;  Location: SURGERY SAME DAY [facility];  Service: Gynecology    BOWEL STIMULATOR INSERTION  03/10/2021    Procedure: INSERTION, ELECTRODE LEADS AND PULSE GENERATOR, NEUROSTIMULATOR, SACRAL - REMOVAL OF INTERSTIM WITH REPLACEMENT OF SACRAL NEUROMODULATION DEVICE;  Surgeon: Joe Noyola M.D.;  Location: SURGERY [facility];  Service: General    MUSCLE BIOPSY Right 01/26/2017    Procedure: MUSCLE BIOPSY - THIGH;  Surgeon: Isidro Vigil M.D.;  Location: SURGERY [facility];  Service:     GASTROSCOPY WITH BALLOON DILATATION N/A 01/04/2017    Procedure: GASTROSCOPY WITH DILATATION;  Surgeon: Torres Vargas M.D.;  Location: SURGERY [facility];  Service:     BOWEL STIMULATOR INSERTION  07/13/2016    Procedure: BOWEL STIMULATOR INSERTION FOR PERMANENT INTERSTIM SACRAL IMPLANT;  Surgeon: Joe Noyola M.D.;  Location: SURGERY [facility];  Service:     RECOVERY  01/27/2016    Procedure: CATH LAB EP STUDY WITH SINUS NODE MODIFICATION ABHINAV;  Surgeon: Recoveryonly Surgery;  Location: SURGERY PRE-POST PROC UNIT [facility];  Service:     OTHER CARDIAC SURGERY  01/2016    cardiac ablation    NEURO DEST FACET L/S W/IG SNGL  04/21/2015    Performed by Reza Tabor at [facility]    LUMBAR FUSION ANTERIOR  08/21/2012    Performed by SUSIE SAWANT at [facility] ORS    ALYSSA BY LAPAROSCOPY  08/29/2010    Performed by SHAYY JOHNS at [facility] ORS    LAMINOTOMY      OTHER ABDOMINAL SURGERY      TONSILLECTOMY      tonsillectomy

## 2025-07-19 ENCOUNTER — PHARMACY VISIT (OUTPATIENT)
Dept: PHARMACY | Facility: MEDICAL CENTER | Age: 35
End: 2025-07-19
Payer: COMMERCIAL

## 2025-07-19 ENCOUNTER — OFFICE VISIT (OUTPATIENT)
Dept: URGENT CARE | Facility: CLINIC | Age: 35
End: 2025-07-19
Payer: MEDICARE

## 2025-07-19 ENCOUNTER — HOSPITAL ENCOUNTER (EMERGENCY)
Facility: MEDICAL CENTER | Age: 35
End: 2025-07-19
Attending: EMERGENCY MEDICINE
Payer: MEDICARE

## 2025-07-19 VITALS
HEART RATE: 65 BPM | WEIGHT: 251 LBS | OXYGEN SATURATION: 97 % | SYSTOLIC BLOOD PRESSURE: 124 MMHG | HEIGHT: 64 IN | BODY MASS INDEX: 42.85 KG/M2 | RESPIRATION RATE: 16 BRPM | TEMPERATURE: 96.9 F | DIASTOLIC BLOOD PRESSURE: 70 MMHG

## 2025-07-19 VITALS
HEART RATE: 70 BPM | OXYGEN SATURATION: 96 % | RESPIRATION RATE: 16 BRPM | BODY MASS INDEX: 42.57 KG/M2 | DIASTOLIC BLOOD PRESSURE: 74 MMHG | TEMPERATURE: 97.1 F | SYSTOLIC BLOOD PRESSURE: 137 MMHG | WEIGHT: 249.34 LBS | HEIGHT: 64 IN

## 2025-07-19 DIAGNOSIS — H57.12 LEFT EYE PAIN: Primary | ICD-10-CM

## 2025-07-19 DIAGNOSIS — H18.892 CORNEAL IRRITATION OF LEFT EYE: Primary | ICD-10-CM

## 2025-07-19 LAB
ALBUMIN SERPL BCP-MCNC: 4.4 G/DL (ref 3.2–4.9)
ALBUMIN/GLOB SERPL: 1.8 G/DL
ALP SERPL-CCNC: 66 U/L (ref 30–99)
ALT SERPL-CCNC: 22 U/L (ref 2–50)
ANION GAP SERPL CALC-SCNC: 12 MMOL/L (ref 7–16)
AST SERPL-CCNC: 15 U/L (ref 12–45)
BASOPHILS # BLD AUTO: 0.7 % (ref 0–1.8)
BASOPHILS # BLD: 0.05 K/UL (ref 0–0.12)
BILIRUB SERPL-MCNC: 0.5 MG/DL (ref 0.1–1.5)
BUN SERPL-MCNC: 13 MG/DL (ref 8–22)
CALCIUM ALBUM COR SERPL-MCNC: 9.3 MG/DL (ref 8.5–10.5)
CALCIUM SERPL-MCNC: 9.6 MG/DL (ref 8.5–10.5)
CHLORIDE SERPL-SCNC: 110 MMOL/L (ref 96–112)
CO2 SERPL-SCNC: 16 MMOL/L (ref 20–33)
CREAT SERPL-MCNC: 0.91 MG/DL (ref 0.5–1.4)
CRP SERPL HS-MCNC: <0.3 MG/DL (ref 0–0.75)
EOSINOPHIL # BLD AUTO: 0.11 K/UL (ref 0–0.51)
EOSINOPHIL NFR BLD: 1.6 % (ref 0–6.9)
ERYTHROCYTE [DISTWIDTH] IN BLOOD BY AUTOMATED COUNT: 42.9 FL (ref 35.9–50)
ERYTHROCYTE [SEDIMENTATION RATE] IN BLOOD BY WESTERGREN METHOD: 17 MM/HOUR (ref 0–25)
GFR SERPLBLD CREATININE-BSD FMLA CKD-EPI: 84 ML/MIN/1.73 M 2
GLOBULIN SER CALC-MCNC: 2.4 G/DL (ref 1.9–3.5)
GLUCOSE SERPL-MCNC: 90 MG/DL (ref 65–99)
HCG SERPL QL: NEGATIVE
HCT VFR BLD AUTO: 41.2 % (ref 37–47)
HGB BLD-MCNC: 13.6 G/DL (ref 12–16)
IMM GRANULOCYTES # BLD AUTO: 0.03 K/UL (ref 0–0.11)
IMM GRANULOCYTES NFR BLD AUTO: 0.4 % (ref 0–0.9)
LYMPHOCYTES # BLD AUTO: 1.58 K/UL (ref 1–4.8)
LYMPHOCYTES NFR BLD: 23.3 % (ref 22–41)
M GENITALIUM DNA SPEC QL NAA+PROBE: NOT DETECTED
M HOMINIS DNA SPEC QL NAA+PROBE: NOT DETECTED
MCH RBC QN AUTO: 31.4 PG (ref 27–33)
MCHC RBC AUTO-ENTMCNC: 33 G/DL (ref 32.2–35.5)
MCV RBC AUTO: 95.2 FL (ref 81.4–97.8)
MONOCYTES # BLD AUTO: 0.55 K/UL (ref 0–0.85)
MONOCYTES NFR BLD AUTO: 8.1 % (ref 0–13.4)
NEUTROPHILS # BLD AUTO: 4.46 K/UL (ref 1.82–7.42)
NEUTROPHILS NFR BLD: 65.9 % (ref 44–72)
NRBC # BLD AUTO: 0 K/UL
NRBC BLD-RTO: 0 /100 WBC (ref 0–0.2)
PLATELET # BLD AUTO: 165 K/UL (ref 164–446)
PMV BLD AUTO: 11.5 FL (ref 9–12.9)
POTASSIUM SERPL-SCNC: 3.9 MMOL/L (ref 3.6–5.5)
PROT SERPL-MCNC: 6.8 G/DL (ref 6–8.2)
RBC # BLD AUTO: 4.33 M/UL (ref 4.2–5.4)
SODIUM SERPL-SCNC: 138 MMOL/L (ref 135–145)
SPECIMEN SOURCE: NORMAL
U PARVUM DNA SPEC QL NAA+PROBE: NOT DETECTED
U UREALYTICUM DNA SPEC QL NAA+PROBE: NOT DETECTED
WBC # BLD AUTO: 6.8 K/UL (ref 4.8–10.8)

## 2025-07-19 PROCEDURE — RXMED WILLOW AMBULATORY MEDICATION CHARGE: Performed by: EMERGENCY MEDICINE

## 2025-07-19 PROCEDURE — 700102 HCHG RX REV CODE 250 W/ 637 OVERRIDE(OP): Mod: UD | Performed by: EMERGENCY MEDICINE

## 2025-07-19 PROCEDURE — 700111 HCHG RX REV CODE 636 W/ 250 OVERRIDE (IP): Mod: UD | Performed by: EMERGENCY MEDICINE

## 2025-07-19 PROCEDURE — 700101 HCHG RX REV CODE 250: Mod: UD | Performed by: EMERGENCY MEDICINE

## 2025-07-19 PROCEDURE — 80053 COMPREHEN METABOLIC PANEL: CPT

## 2025-07-19 PROCEDURE — 3078F DIAST BP <80 MM HG: CPT | Performed by: NURSE PRACTITIONER

## 2025-07-19 PROCEDURE — RXMED WILLOW AMBULATORY MEDICATION CHARGE: Performed by: NURSE PRACTITIONER

## 2025-07-19 PROCEDURE — A9270 NON-COVERED ITEM OR SERVICE: HCPCS | Mod: UD | Performed by: EMERGENCY MEDICINE

## 2025-07-19 PROCEDURE — 86140 C-REACTIVE PROTEIN: CPT

## 2025-07-19 PROCEDURE — 36415 COLL VENOUS BLD VENIPUNCTURE: CPT

## 2025-07-19 PROCEDURE — 99213 OFFICE O/P EST LOW 20 MIN: CPT | Performed by: NURSE PRACTITIONER

## 2025-07-19 PROCEDURE — 84703 CHORIONIC GONADOTROPIN ASSAY: CPT

## 2025-07-19 PROCEDURE — 85025 COMPLETE CBC W/AUTO DIFF WBC: CPT

## 2025-07-19 PROCEDURE — 85652 RBC SED RATE AUTOMATED: CPT

## 2025-07-19 PROCEDURE — 99284 EMERGENCY DEPT VISIT MOD MDM: CPT

## 2025-07-19 PROCEDURE — 3074F SYST BP LT 130 MM HG: CPT | Performed by: NURSE PRACTITIONER

## 2025-07-19 RX ORDER — PROPARACAINE HYDROCHLORIDE 5 MG/ML
1 SOLUTION/ DROPS OPHTHALMIC ONCE
Status: COMPLETED | OUTPATIENT
Start: 2025-07-19 | End: 2025-07-19

## 2025-07-19 RX ORDER — FLUORESCEIN SODIUM 1 MG/MG
1 STRIP OPHTHALMIC ONCE
Status: COMPLETED | OUTPATIENT
Start: 2025-07-19 | End: 2025-07-19

## 2025-07-19 RX ORDER — PREDNISONE 20 MG/1
TABLET ORAL
Qty: 30 TABLET | Refills: 0 | Status: SHIPPED | OUTPATIENT
Start: 2025-07-19

## 2025-07-19 RX ORDER — BACITRACIN ZINC AND POLYMYXIN B SULFATE 500; 10000 [USP'U]/G; [USP'U]/G
OINTMENT OPHTHALMIC
Qty: 3.5 G | Refills: 0 | Status: SHIPPED | OUTPATIENT
Start: 2025-07-19

## 2025-07-19 RX ORDER — PREDNISONE 20 MG/1
80 TABLET ORAL ONCE
Status: COMPLETED | OUTPATIENT
Start: 2025-07-19 | End: 2025-07-19

## 2025-07-19 RX ORDER — OXYCODONE AND ACETAMINOPHEN 5; 325 MG/1; MG/1
1 TABLET ORAL ONCE
Refills: 0 | Status: COMPLETED | OUTPATIENT
Start: 2025-07-19 | End: 2025-07-19

## 2025-07-19 RX ADMIN — PREDNISONE 80 MG: 20 TABLET ORAL at 17:22

## 2025-07-19 RX ADMIN — FLUORESCEIN SODIUM 1 MG: 1 STRIP OPHTHALMIC at 14:45

## 2025-07-19 RX ADMIN — OXYCODONE AND ACETAMINOPHEN 1 TABLET: 5; 325 TABLET ORAL at 14:31

## 2025-07-19 RX ADMIN — PROPARACAINE HYDROCHLORIDE 1 DROP: 5 SOLUTION/ DROPS OPHTHALMIC at 14:45

## 2025-07-19 ASSESSMENT — FIBROSIS 4 INDEX
FIB4 SCORE: 0.64
FIB4 SCORE: 0.64

## 2025-07-19 ASSESSMENT — VISUAL ACUITY
OS_CC: 20/70
OD_CC: 20/70

## 2025-07-19 ASSESSMENT — PAIN DESCRIPTION - PAIN TYPE: TYPE: ACUTE PAIN

## 2025-07-19 NOTE — ED PROVIDER NOTES
Emergency Physician Note    Chief Concern:  Chief Complaint   Patient presents with    Eye Drainage     Sent from  for worsening eye pain and drainage. Left eye pain since 0500 this morning. Minimal redness and slight swelling. +purulent drainage. +photosensitivity, +blurry vision          External Records Reviewed:  1.  Outpatient records reviewed: Patient was seen in urgent care earlier today, APRN note reviewed from that visit.  She was seen for evaluation of left eye pain, discharge, and foreign body sensation.  Fluorescein examination showed a small area of uptake at the 2 o'clock position consistent with small corneal abrasion.  She was treated with bacitracin and Polymycin.  Counseled to go to the emergency department if no improvement in 24 hours.    2.  Inpatient records reviewed: Hospital medicine physician history and physical examination note reviewed from 8/18/2023.  She has a past medical history significant for idiopathic intracranial hypertension, and optic neuritis presenting with left eye pain.  She reports this felt similar to her previous episodes of optic neuritis.  She had been followed by Dr. Yousif at that time, who has since retired.  Recommendation was for MR imaging of the orbits, MRV, IR lumbar puncture for therapeutic tap, antibiotic and antiaquaporin antibodies as well as 3-day course of Solu-Medrol 1 g.Visual acuity was 20/70 in both eyes.  MRI of the orbits and MRV were unremarkable for evidence of papilledema.  No evidence of acute optic neuritis.  Lumbar puncture was done with normal opening pressure.  She was discharged on oral steroids in stable condition.    HPI/ROS     HPI:  Kristin Balderrama is a 34 y.o. female who presents to the emergency department today for evaluation of worsening left eye pain.  She was seen earlier today urgent care, diagnosed with a corneal abrasion of the left eye.  She was discharged with erythromycin ointment, though no pain medication.  She  states her pain began to worsen, so she came to the emergency department today.  Pain is exacerbated by touching the affected area, she also describes associated photophobia.  She has not noticed any worsening periorbital edema.  Due to worsening symptoms she came to the emergency department for further evaluation.      PAST MEDICAL HISTORY  Past Medical History[1]    SURGICAL HISTORY  Past Surgical History[2]    FAMILY HISTORY  Family History   Problem Relation Age of Onset    Hypertension Mother     Sleep Apnea Mother     Heart Disease Mother     Other Mother         hypothryod    No Known Problems Sister     Other Sister         Narcolepsy;fibromyalsia;bone;nerve    Genitourinary () Problems Sister         endometriosis    Hypertension Maternal Uncle     Heart Disease Maternal Grandmother     Hypertension Maternal Grandmother     Cancer Neg Hx        SOCIAL HISTORY   reports that she has never smoked. She has never been exposed to tobacco smoke. She has never used smokeless tobacco. She reports that she does not drink alcohol and does not use drugs.    CURRENT MEDICATIONS  Previous Medications    ALBUTEROL 108 (90 BASE) MCG/ACT AERO SOLN INHALATION AEROSOL    Inhale 1-2 Puffs every four hours as needed for Shortness of Breath.    AMLODIPINE (NORVASC) 5 MG TAB    Take 1 tablet by mouth every day.    ASPIRIN 81 MG EC TABLET    Take 1 Tablet by mouth every day.    BACITRACIN-POLYMYXIN B (POLYSPORIN) 500-54984 UNIT/GM OINTMENT    Apply 0.5 inch of medication to left eye 4 times daily for 5 days    BOTOX 200 UNITS RECON SOLN INJECTION    Inject 200 Units as directed one time. Every 12 weeks    CARVEDILOL (COREG) 25 MG TAB    Take 25 mg by mouth 2 times a day with meals.    DICYCLOMINE (BENTYL) 10 MG CAP    Take 1 Capsule by mouth 2 times a day as needed for abdominal cramping/discomfort    DIPHENHYDRAMINE (BENADRYL) 25 MG TAB    Take 50 mg by mouth at bedtime.    DOCUSATE SODIUM (COLACE) 250 MG CAPSULE    Take 250  mg by mouth 2 times a day.    GABAPENTIN (NEURONTIN) 400 MG CAP    TAKE 3 CAPSULES BY MOUTH THREE TIMES DAILY.  FOR 30 DAY SUPPLY. DX: M79.7    ISOSORBIDE MONONITRATE SR (IMDUR) 60 MG TABLET SR 24 HR    Take 1 Tablet by mouth every morning.    IVABRADINE (CORLANOR) 7.5 MG TAB TABLET    Take 1 Tablet by mouth 2 times a day with meals.    LAMOTRIGINE (LAMICTAL) 25 MG TAB    Take 2 Tablets by mouth 2 times a day.    MELATONIN 10 MG TAB    Take 10 mg by mouth at bedtime.    METOPROLOL SR (TOPROL XL) 50 MG TABLET SR 24 HR    Take 1 Tablet by mouth 2 times a day.    MICONAZOLE (MICONAZOLE 7) 2 % CREAM    apply 1 applicatorful at bedtime Vaginal weekly (Maintenance)    MICONAZOLE (MICONAZOLE 7) 2 % CREAM    apply 1 applicatorful at bedtime Vaginal Once a day    NITROGLYCERIN (NITROLINGUAL) 0.4 MG/SPRAY SOLUTION    Place 1 Spray under the tongue as needed (chest pains).    OMEPRAZOLE (PRILOSEC) 20 MG DELAYED-RELEASE CAPSULE    Take 2 Capsules by mouth 2 times a day.    ONDANSETRON (ZOFRAN ODT) 4 MG TABLET DISPERSIBLE    Dissolve 1 Tablet by mouth every four hours as needed for Nausea/Vomiting (give PO if no IV route available).    PHENAZOPYRIDINE (PYRIDIUM) 200 MG TAB    Take 1 Tablet by mouth 3 times a day as needed for Mild Pain or Moderate Pain.    PYRIDOSTIGMINE (MESTINON) 60 MG TAB    Take 1 Tablet by mouth 3 times a day.    RANOLAZINE 1000 MG TABLET SR 12 HR    Take 1 Tablet by mouth 2 times a day.    SPIRONOLACTONE (ALDACTONE) 50 MG TAB    Take 1 Tablet by mouth every day.    TIZANIDINE (ZANAFLEX) 4 MG TAB    Take 1 Tablet by mouth Three Times A Day as needed for muscle cramping and spasms    TOPIRAMATE (TOPAMAX) 50 MG TABLET    Take 1 Tablet by mouth 2 times a day.    ZIPRASIDONE (GEODON) 40 MG CAP    Take 1 capsule by mouth at bedtime.       ALLERGIES  Cefdinir, Depakote [divalproex sodium], Doxycycline, Montelukast [singulair], Vancomycin, Wound dressing adhesive, Amitriptyline, Amoxicillin, Aripiprazole  "[abilify], Clindamycin, Erythromycin, Flomax [tamsulosin hydrochloride], Hydromorphone, Metformin, Sulfa drugs, Tamsulosin, Tape, Sulfamethoxazole w-trimethoprim, Ciprofloxacin, Keflex, Levofloxacin, and Metronidazole    PHYSICAL EXAM  Vital Signs: /74   Pulse 70   Temp 36.2 °C (97.1 °F) (Temporal)   Resp 16   Ht 1.626 m (5' 4\")   Wt 113 kg (249 lb 5.4 oz)   SpO2 96%   BMI 42.80 kg/m²   Constitutional: Alert, no acute distress  Ophthalmologic: Visual acuity 20/40 in the right eye, 20/70 in the left, fluorescein stain applied with no corneal abrasion visualized.  Pupils are equal, round, and reactive, very minimally injected conjunctiva in the left eye, no conjunctival nor subconjunctival hemorrhage, extraocular movements intact and painless, no periorbital cellulitis, pain is improved with instillation of proparacaine drops.  Cardiovascular: No tachycardia  Pulmonary: No respiratory distress, normal work of breathing  Skin: Warm, dry, no rashes or lesions  Musculoskeletal: Normal range of motion in all extremities, no swelling or deformity noted  Neurologic: Alert, oriented, normal motor function, no speech deficits  Psychiatric: Normal and appropriate mood and affect    Diagnostic Studies & Procedures    Labs:  All labs reviewed by me as noted below.    Course and Medical Decision Making    Initial Assessment and Plan:  Ms. Balderrama presents to the emergency department today for evaluation of left eye pain as documented above.  She was seen earlier in urgent care, diagnosed with corneal abrasion.  On my assessment, I do not see any evidence of corneal abrasion.  She does have a history of similar symptoms, with identical left eye pain.  She states that she has been diagnosed with optic neuritis in the past, though she has not had any imaging consistent with optic neuritis.  Visual acuity in the affected eye is 20/70, identical to when she was previously evaluated by ophthalmology as documented in " medical record review.  Pain is improved with proparacaine drops.  No evidence of orbital nor periorbital cellulitis on physical examination.    Screening labs were obtained today, she has a normal white blood count, CBC is entirely within normal limits.  CMP is normal excepting CO2 of 16.  CRP is within normal limits.  ESR is not elevated.  hCG negative.    I discussed her presentation with Dr. Franks, neuro-ophthalmologist.  At this time she has no acute vision changes, visual acuity is identical to when she was seen previously.  Primary concern is pain.  She has responded well to steroids in the past, recommendation is for steroid taper with close outpatient follow-up.      First dose of prednisone was given in the emergency department, she is discharged with a prescription for the same.  She will follow-up in ophthalmology clinic, will call Monday morning to schedule a follow-up appointment. Return precautions were discussed with the patient, and provided in written form with the patient's discharge instructions.     Additional Problems and Disposition    I have discussed management of the patient with the following physicians:   Dr. Franks, neuro-ophthalmologist    Escalation of care considered, and ultimately not performed:  1.  MR imaging considered, however patient had this done recently with no evidence of optic neuritis, do not believe repeat emergent imaging is necessary at this time with no vision loss    Prescription medication considerations and management:  1.  Prednisone prescription provided    Disposition:  Discharged in stable condition    FINAL IMPRESSION   1. Left eye pain        FOLLOW UP:  John Franks M.D.  [address] [place] [id number]  [phone]    Schedule an appointment as soon as possible for a visit       [facility], Emergency Dept  [address] [facility] [id number] [phone]  Go to   If symptoms worsen      OUTPATIENT  "MEDICATIONS:  New Prescriptions    PREDNISONE (DELTASONE) 20 MG TAB    Take 4 tabs (80 mg) po daily on days 1-3, then take 3 tabs (60 mg) po daily on days 4-6, then take 2 tabs (40 mg) po daily on days 7-9, then take 1 tab (20 mg) po daily on days 10-12, then take 1/2 tab (10 mg) po daily on days 13-15            [1]   Past Medical History:  Diagnosis Date    Abdominal pain     Anginal syndrome     Random chest pain especially with tachycardia    Apnea, sleep     Arthritis     ASTHMA     when around smoke    Back pain     Borderline personality disorder (HCC)     Chickenpox     Chronic UTI 09/18/2010    Daytime sleepiness     Dental disorder 03/08/2021    Infected tooth    Depression     Diabetes (HCC)     Diarrhea     Incontinece    Disorder of thyroid     Hashimoto's    Fatigue     Frequent headaches     Heart burn     History of falling     Inappropriate sinus tachycardia (HCC) 2016    Statusp post ablation by Dr. Kumar.    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    Urinary bladder disorder     Suprapubic cath. 2/8/22: Not anymore.     Urinary incontinence     Weakness     Wears glasses    [2]   Past Surgical History:  Procedure Laterality Date    WA CYSTOSCOPY,INSERT URETERAL STENT Right 2/12/2024    Procedure: CYSTOSCOPY, WITH RIGHT URETEROSCOPY, WITH LITHOTRIPSY, WITH INSERTION OF RIGHT URETERAL STENT;  Surgeon: Josafat GORE" RUFUS Roberson;  Location: [facility];  Service: Urology    MN CYSTO/URETERO/PYELOSCOPY, DX Right 2/12/2024    Procedure: URETEROSCOPY;  Surgeon: Josafat Roberson M.D.;  Location: [facility];  Service: Urology    LASERTRIPSY Right 2/12/2024    Procedure: LITHOTRIPSY, USING LASER;  Surgeon: Josafat Roberson M.D.;  Location: [facility];  Service: Urology    LAPAROSCOPIC INGUINAL HERNIA REPAIR Right 07/21/2023    Procedure: LAPAROSCOPIC RIGHT INGUINAL HERNIA REPAIR WITH MESH;  Surgeon: Joe Noyola M.D.;  Location: [facility];  Service: General    HERNIA REPAIR Right 07/21/2023    PB PERCUT FIX PBOX/NECK FEMUR FX Left 01/28/2023    Procedure: FIXATION, HIP, USING CANNULATED SCREW;  Surgeon: Noman Abdul M.D.;  Location: [facility];  Service: Orthopedics    MN LAP,DIAGNOSTIC ABDOMEN  02/14/2022    Procedure: LAPAROSCOPY;  Surgeon: Seamus Pisano M.D.;  Location: SURGERY SAME DAY [facility];  Service: Gynecology    OVARIAN CYSTECTOMY Right 02/14/2022    Procedure: EXCISION, CYST, OVARY;  Surgeon: Seamus Pisano M.D.;  Location: SURGERY SAME DAY [facility];  Service: Gynecology    BOWEL STIMULATOR INSERTION  03/10/2021    Procedure: INSERTION, ELECTRODE LEADS AND PULSE GENERATOR, NEUROSTIMULATOR, SACRAL - REMOVAL OF INTERSTIM WITH REPLACEMENT OF SACRAL NEUROMODULATION DEVICE;  Surgeon: Joe Noyola M.D.;  Location: [facility];  Service: General    MUSCLE BIOPSY Right 01/26/2017    Procedure: MUSCLE BIOPSY - THIGH;  Surgeon: Isidro Vigil M.D.;  Location: [facility];  Service:     GASTROSCOPY WITH BALLOON DILATATION N/A 01/04/2017    Procedure: GASTROSCOPY WITH DILATATION;  Surgeon: Torres Vargas M.D.;  Location: [facility];  Service:     BOWEL STIMULATOR INSERTION  07/13/2016    Procedure: BOWEL STIMULATOR INSERTION FOR PERMANENT INTERSTIM SACRAL IMPLANT;  Surgeon: Joe Noyola M.D.;  Location: [facility];  Service:      RECOVERY  01/27/2016    Procedure: CATH LAB EP STUDY WITH SINUS NODE MODIFICATION ABHINAV;  Surgeon: [facility];  [place] [facility]  [place]:     OTHER CARDIAC SURGERY  01/2016    cardiac ablation    NEURO DEST FACET L/S W/IG SNGL  04/21/2015    Performed by Reza Tabor at SURGERY [facility]    LUMBAR FUSION ANTERIOR  08/21/2012    Performed by SUSIE SAWANT at SURGERY [facility]S    ALYSSA BY LAPAROSCOPY  08/29/2010    Performed by SHAYY OJHNS at SURGERY [facility] ORS    LAMINOTOMY      OTHER ABDOMINAL SURGERY      TONSILLECTOMY      tonsillectomy

## 2025-07-19 NOTE — ED TRIAGE NOTES
Chief Complaint   Patient presents with    Eye Drainage     Sent from  for worsening eye pain and drainage. Left eye pain since 0500 this morning. Minimal redness and slight swelling. +purulent drainage. +photosensitivity, +blurry vision       Patient ambulatory to triage for above complaint. Patient A&Ox4, GCS 15, patient speaking in full sentences. Equal and unlabored respirations. Patient educated on triage process and encouraged to notify staff if condition worsens. Patient returned to the lobby in stable condition.

## 2025-07-19 NOTE — PROGRESS NOTES
Chief Complaint   Patient presents with    Conjunctivitis     X today/ discharge in eyes/ redness/ both eyes / eyes swelling/ eye movement pain        HISTORY OF PRESENT ILLNESS: Patient is a pleasant 34 y.o. female who presents to urgent care today with left eye concerns.  The patient notes she woke up today with left eye pain, erythema, discharge, photophobia, foreign body sensation.  She wears glasses, not contacts.  She does note some mild erythema to right eye as well.    Patient Active Problem List    Diagnosis Date Noted    Low back pain 04/10/2025    Hypertension 04/10/2025    Multiple drug allergies 03/17/2025    Chest pain 02/16/2025    Acute chest pain 02/15/2025    Symptom involving bladder 12/05/2024    Abdominal pain 10/03/2024    Morbid obesity (HCC) 09/23/2024    Kidney stone 03/29/2024    SVT (supraventricular tachycardia) ([facility]) 11/14/2023    PCOS (polycystic ovarian syndrome) 11/14/2023    Inguinal hernia 11/14/2023    Myopia of both eyes 08/29/2023    Cervical radiculopathy 08/17/2023    POTS (postural orthostatic tachycardia syndrome) 01/28/2023    Moderate persistent asthma without complication 01/27/2023    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    Hypermobility syndrome 11/10/2022    History of nephrolithiasis 10/31/2022    Chronic obstructive pulmonary disease (HCC) 10/13/2022    Right inguinal hernia 10/09/2022    Migraine, hemiplegic 07/27/2022    Hidradenitis axillaris 07/10/2022    Vision changes 06/28/2022    Inappropriate sinus tachycardia (HCC) 09/24/2021    Normal pressure hydrocephalus (HCC) 06/04/2021    Hemorrhoid 05/24/2021    Bilateral hand pain 04/05/2021    Prolonged Q-T interval on ECG 08/27/2020    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[facility]) 08/15/2020    Chronic restrictive lung disease 04/20/2020    Schizoaffective disorder, depressive type ([facility]) 03/02/2020    IIH (idiopathic intracranial hypertension) 02/27/2020    Mixed stress and urge urinary incontinence 12/27/2019    Immunocompromised ([facility])  11/06/2019    Moderate persistent asthma with acute exacerbation 08/14/2019    Optic neuritis 05/27/2019    Transaminitis 08/08/2018    TONYA (obstructive sleep apnea) 01/09/2018    Mood disorder ([facility]) 10/28/2016    Vitamin D deficiency 05/21/2016    Severe obesity (BMI >= 40) ([facility]) 03/07/2016    Scoliosis 03/07/2016    GERD (gastroesophageal reflux disease) 03/07/2016    OAB (overactive bladder) 02/08/2016    Pain, chronic 06/29/2015    Fatty liver disease, nonalcoholic 01/19/2015    Dissociative identity disorder (HCC) 04/02/2011       Allergies:Cefdinir, Depakote [divalproex sodium], Doxycycline, Montelukast [singulair], Vancomycin, Wound dressing adhesive, Amitriptyline, Amoxicillin, Aripiprazole [abilify], Clindamycin, Erythromycin, Flomax [tamsulosin hydrochloride], Hydromorphone, Metformin, Sulfa drugs, Tamsulosin, Tape, Sulfamethoxazole w-trimethoprim, Ciprofloxacin, Keflex, Levofloxacin, and Metronidazole    Current Medications and Prescriptions Ordered in Epic[1]    Past Medical History[2]    Social History[3]    Family Status   Relation Name Status    Mo thyroid disease,IBS Alive        44 2016    Fa  Alive        bio father hx unknown    Sis  Alive    Sis  Alive    MUnc  Alive    MGMo  Alive    Neg Hx  (Not Specified)   No partnership data on file     Family History   Problem Relation Age of Onset    Hypertension Mother     Sleep Apnea Mother     Heart Disease Mother     Other Mother         hypothryod    No Known Problems Sister     Other Sister         Narcolepsy;fibromyalsia;bone;nerve    Genitourinary () Problems Sister         endometriosis    Hypertension Maternal Uncle     Heart Disease Maternal Grandmother     Hypertension Maternal Grandmother     Cancer Neg Hx        ROS:  Review of Systems   Constitutional: Negative for fever, chills, weight loss, malaise, and fatigue.   HENT: Negative for ear pain, nosebleeds, congestion, sore throat and neck pain.    Eyes: Positive for left eye erythema,  "crusting, irritation, pain, photophobia, foreign body sensation.  Neuro: Negative for headache, sensory changes, weakness, seizure, LOC.   Cardiovascular: Negative for chest pain, palpitations, orthopnea and leg swelling.   Respiratory: Negative for cough, sputum production, shortness of breath and wheezing.   Gastrointestinal: Negative for abdominal pain, nausea, vomiting or diarrhea.   Genitourinary: Negative for dysuria, urgency and frequency.  Musculoskeletal: Negative for falls, neck pain, back pain, joint pain, myalgias.   Skin: Negative for rash, diaphoresis.     Exam:  /70   Pulse 65   Temp 36.1 °C (96.9 °F) (Temporal)   Resp 16   Ht 1.626 m (5' 4\")   Wt 114 kg (251 lb)   SpO2 97%   General: well-nourished, well-developed female in NAD  Head: normocephalic, atraumatic  Eyes: PERRLA, minimal left-sided conjunctival injection with crusting noted, no conjunctival injection noted right side, acuity grossly intact, lids normal.  Fluorescein examination of left eye: Patient reports resolved symptoms with proparacaine.  There is a 1x2 mm linear area of uptake to the 2 o'clock position.  Ears: normal shape and symmetry, no tenderness, no discharge. External canals are without any significant edema or erythema. Tympanic membranes are without any inflammation, no effusion. Gross auditory acuity is intact.  Nose: symmetrical without tenderness, no discharge.  Mouth/Throat: reasonable hygiene, no erythema, exudates or tonsillar enlargement.  Neck: no masses, range of motion within normal limits, no tracheal deviation. No obvious thyroid enlargement.   Lymph: no cervical adenopathy. No supraclavicular adenopathy.   Neuro: alert and oriented. Cranial nerves 1-12 grossly intact. No sensory deficit.   Cardiovascular: regular rate and rhythm. No edema.  Pulmonary: no distress. Chest is symmetrical with respiration, no wheezes, crackles, or rhonchi.   Musculoskeletal: no clubbing, appropriate muscle tone, gait is " stable.  Skin: warm, dry, intact, no clubbing, no cyanosis, no rashes.   Psych: appropriate mood, affect, judgement.         Assessment/Plan:  1. Corneal irritation of left eye  bacitracin-polymyxin b (POLYSPORIN) [id number] UNIT/GM Ointment          Patient presents with left eye complaints upon awakening today.  There is a small area of uptake noted at 2 o'clock position, consistent with small corneal abrasion?  Patient reports full resolve of symptoms with proparacaine.  At this point we will treat patient with bacitracin and Polymycin.  If no improvement in 24 hours, she is instructed to go to the ER for further care.  Supportive care, differential diagnoses, and indications for immediate follow-up discussed with patient.   Pathogenesis of diagnosis discussed including typical length and natural progression.   Instructed to return to clinic or nearest emergency department for any change in condition, further concerns, or worsening of symptoms.  Patient states understanding of the plan of care and discharge instructions.        Please note that this dictation was created using voice recognition software. I have made every reasonable attempt to correct obvious errors, but I expect that there are errors of grammar and possibly content that I did not discover before finalizing the note.      VANIA Mehta        [1]   Current Outpatient Medications Ordered in Epic   Medication Sig Dispense Refill    bacitracin-polymyxin b (POLYSPORIN) [id number] UNIT/GM Ointment Apply 0.5 inch of medication to left eye 4 times daily for 5 days 3.5 g 0    phenazopyridine (PYRIDIUM) 200 MG Tab Take 1 Tablet by mouth 3 times a day as needed for Mild Pain or Moderate Pain. 10 Tablet 0    miconazole (MICONAZOLE 7) 2 % Cream apply 1 applicatorful at bedtime Vaginal weekly (Maintenance) 45 g 5    miconazole (MICONAZOLE 7) 2 % Cream apply 1 applicatorful at bedtime Vaginal Once a day 45 g 0    [Paused] carvedilol (COREG) 25 MG Tab Take  25 mg by mouth 2 times a day with meals.      [Paused] metoprolol SR (TOPROL XL) 50 MG TABLET SR 24 HR Take 1 Tablet by mouth 2 times a day. 180 Tablet 3    ziprasidone (GEODON) 40 MG Cap Take 1 capsule by mouth at bedtime. 90 Capsule 1    lamoTRIgine (LAMICTAL) 25 MG Tab Take 2 Tablets by mouth 2 times a day. 360 Tablet 1    tizanidine (ZANAFLEX) 4 MG Tab Take 1 Tablet by mouth Three Times A Day as needed for muscle cramping and spasms (Patient taking differently: Take 4 mg by mouth 3 times a day as needed (muscle spasms).) 90 Tablet 1    pyridostigmine (MESTINON) 60 MG Tab Take 1 Tablet by mouth 3 times a day. (Patient taking differently: Take 60 mg by mouth 2 times a day.) 90 Tablet 3    BOTOX 200 units Recon Soln injection Inject 200 Units as directed one time. Every 12 weeks      gabapentin (NEURONTIN) 400 MG Cap TAKE 3 CAPSULES BY MOUTH THREE TIMES DAILY.  FOR 30 DAY SUPPLY. DX: M79.7 270 Capsule 0    isosorbide mononitrate SR (IMDUR) 60 MG TABLET SR 24 HR Take 1 Tablet by mouth every morning. 90 Tablet 3    ivabradine (CORLANOR) 7.5 MG Tab tablet Take 1 Tablet by mouth 2 times a day with meals. 180 Tablet 3    amLODIPine (NORVASC) 5 MG Tab Take 1 tablet by mouth every day. 90 Tablet 3    [Paused] nitroglycerin (NITROLINGUAL) 0.4 MG/SPRAY Solution Place 1 Spray under the tongue as needed (chest pains). 12 g 11    ondansetron (ZOFRAN ODT) 4 MG TABLET DISPERSIBLE Dissolve 1 Tablet by mouth every four hours as needed for Nausea/Vomiting (give PO if no IV route available). 10 Tablet 0    albuterol 108 (90 Base) MCG/ACT Aero Soln inhalation aerosol Inhale 1-2 Puffs every four hours as needed for Shortness of Breath.      dicyclomine (BENTYL) 10 MG Cap Take 1 Capsule by mouth 2 times a day as needed for abdominal cramping/discomfort 60 Capsule 1    Ranolazine 1000 MG TABLET SR 12 HR Take 1 Tablet by mouth 2 times a day. 180 Tablet 3    aspirin 81 MG EC tablet Take 1 Tablet by mouth every day. 100 Tablet 3     "docusate sodium (COLACE) 250 MG capsule Take 250 mg by mouth 2 times a day.      spironolactone (ALDACTONE) 50 MG Tab Take 1 Tablet by mouth every day. 90 Tablet 3    omeprazole (PRILOSEC) 20 MG delayed-release capsule Take 2 Capsules by mouth 2 times a day. 180 Capsule 3    topiramate (TOPAMAX) 50 MG tablet Take 1 Tablet by mouth 2 times a day. 180 Tablet 4    diphenhydrAMINE (BENADRYL) 25 MG Tab Take 50 mg by mouth at bedtime.      Melatonin 10 MG Tab Take 10 mg by mouth at bedtime.       No current Epic-ordered facility-administered medications on file.   [2]   Past Medical History:  Diagnosis Date    Abdominal pain     Anginal syndrome     Random chest pain especially with tachycardia    Apnea, sleep     Arthritis     ASTHMA     when around smoke    Back pain     Borderline personality disorder (HCC)     Chickenpox     Chronic UTI 09/18/2010    Daytime sleepiness     Dental disorder 03/08/2021    Infected tooth    Depression     Diabetes (HCC)     Diarrhea     Incontinece    Disorder of thyroid     Hashimoto's    Fatigue     Frequent headaches     Heart burn     History of falling     Inappropriate sinus tachycardia (HCC) 2016    Statusp post ablation by Dr. Kumar.    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    Urinary bladder disorder     Suprapubic cath. 2/8/22: Not anymore.     Urinary incontinence     Weakness     Wears " glasses    [3]   Social History  Tobacco Use    Smoking status: Never     Passive exposure: Never    Smokeless tobacco: Never   Vaping Use    Vaping status: Never Used   Substance Use Topics    Alcohol use: No     Alcohol/week: 0.0 oz    Drug use: Never

## 2025-07-20 ENCOUNTER — PHARMACY VISIT (OUTPATIENT)
Dept: PHARMACY | Facility: MEDICAL CENTER | Age: 35
End: 2025-07-20

## 2025-07-20 NOTE — DISCHARGE INSTRUCTIONS
1.  Please take the steroids as prescribed.    2.  Please follow-up with the ophthalmologist listed above, call Monday morning to schedule a follow-up appointment.    3.  Return to the emergency department if you develop any new or worsening symptoms including worsening eye pain, fevers, redness, or any further concerns.

## 2025-07-21 ENCOUNTER — TELEPHONE (OUTPATIENT)
Dept: OPHTHALMOLOGY | Facility: MEDICAL CENTER | Age: 35
End: 2025-07-21
Payer: MEDICARE

## 2025-07-21 NOTE — TELEPHONE ENCOUNTER
Pt called to schedule a fv from ED on 07/19/25, offered pt an appt for 07/23 @ 10:30. Pts stated her eye would be better by then and declined to schedule an appt. Pt stated she didn't see the point in coming when her eye would be better  patient said she would call if she needed an appt.

## 2025-07-24 ENCOUNTER — PHARMACY VISIT (OUTPATIENT)
Dept: PHARMACY | Facility: MEDICAL CENTER | Age: 35
End: 2025-07-24
Payer: COMMERCIAL

## 2025-07-24 ENCOUNTER — TELEPHONE (OUTPATIENT)
Dept: MEDICAL GROUP | Facility: MEDICAL CENTER | Age: 35
End: 2025-07-24
Payer: MEDICARE

## 2025-07-24 DIAGNOSIS — R73.9 STEROID-INDUCED HYPERGLYCEMIA: ICD-10-CM

## 2025-07-24 DIAGNOSIS — R73.9 HYPERGLYCEMIA: Primary | ICD-10-CM

## 2025-07-24 DIAGNOSIS — T38.0X5A STEROID-INDUCED HYPERGLYCEMIA: ICD-10-CM

## 2025-07-24 PROCEDURE — RXMED WILLOW AMBULATORY MEDICATION CHARGE: Performed by: STUDENT IN AN ORGANIZED HEALTH CARE EDUCATION/TRAINING PROGRAM

## 2025-07-25 NOTE — TELEPHONE ENCOUNTER
Pt lvm regarding the conversation she was having on mychart with Dr Goodson,about increased sugar due to steroid use and also electrolyte imbalance. She stated her sugar now read 216 and she was getting shaky.  Provider already responded with an Rx.

## 2025-07-29 NOTE — DISCHARGE SUMMARY
Discharge Summary    CHIEF COMPLAINT ON ADMISSION  Chief Complaint   Patient presents with   • Wound Re-Check   • Body Aches   • N/V       Reason for Admission  Wound infection    Admission Date  8/2/2018    CODE STATUS  Full Code    HPI & HOSPITAL COURSE  This is a 27 y.o. 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  She had been placed on clindamycin without improvement.  Wound culture from 8/3 had mixed skin letty.  ID was consulted regarding her multiple allergies and psychiatric medications.  Her cellulitis improved with antibiotics and wound care.  She had a midline IV placed for home infusions.   PT/OT/RN/PICC line care and IV infusions of Rocephin daily until stop date 8/12/18 were set up.  She had 6 doses of Rocephin completed in the hospital with 1 dose of clindamycin IV and 1 dose of linezolid on admission.  She has chronic diarrhea from her prior lap delmar on questran.  She had formed stool, therefore not C. Dificil.  She will be started on probiotics at discharge.  Her left breast wound was examined and a small 5mm diameter escoriation remained from her former spider bite area.  No necrosis, drainage or erythema noted.   Midline left upper arm without erythema or swelling.  Would recommend discontinuing narcotics in this patient and optimize neurontin and other non-narcotic medications.  She was noted to have a mild transaminitis with ALT 74 to 72 on repeat.  This is likely to her psychiatric medications and will need monitoring.  No symptoms of pain/jaundice noted.  On review, there have been elevations of ALT as far back as 12/6/2016 with ALT of 79.       Therefore, she is discharged in good and stable  condition to home with organized home healthcare and close outpatient follow-up.    The patient met 2-midnight criteria for an inpatient stay at the time of discharge.    Discharge Date  8/8/2018    FOLLOW UP ITEMS POST DISCHARGE  Follow up cellulitis of left breast in 1 week with PCP.   IV infusions via mid line with wound care/PICC line care.   Monitoring of LFTs while on psychiatric medications.  Mild elevation noted since 12/6/2016.    DISCHARGE DIAGNOSES  Principal Problem:    Cellulitis of left breast POA: Yes  Active Problems:    Type 2 diabetes mellitus without complication, without long-term current use of insulin (HCC) POA: Yes    Chronic inflammatory arthritis (Chronic) POA: Yes    Psychosis, schizophrenia, simple (HCC) (Chronic) POA: Yes    Morbid obesity with BMI of 45.0-49.9, adult (HCC) POA: Yes    Chronic pain syndrome (Chronic) POA: Yes    On home oxygen therapy (Chronic) POA: Yes    Functional diarrhea POA: Yes    TONYA (obstructive sleep apnea) POA: Yes    Chronic respiratory failure with hypoxia, on home oxygen therapy (HCC) POA: Yes    Transaminitis POA: Yes  Resolved Problems:    * No resolved hospital problems. *      FOLLOW UP  Future Appointments  Date Time Provider Department Center   10/11/2018 9:00 AM VANIA Asif [facility] None     Torres Brody M.D.  [address] [place] [id number]  [phone]    Schedule an appointment as soon as possible for a visit in 1 week  Hospital follow-up appointment with PCP      MEDICATIONS ON DISCHARGE     Medication List      START taking these medications      Instructions   NS SOLN 100 mL with cefTRIAXone 2 GM SOLR 2 g   Doctor's comments:  [facility] is providing the antibiotic to patient through home health.  2 g by Intravenous route every evening for 4 days.  Dose:  2 g     Probiotic 250 MG Caps   Take 1 Cap by mouth 2 times a day, with meals for 30 days.  Dose:  1 Cap        CHANGE how you take these medications       Instructions   busPIRone 10 MG Tabs  What changed:  medication strength  Commonly known as:  BUSPAR   Take 0.5 Tabs by mouth 2 times a day.  Dose:  5 mg        CONTINUE taking these medications      Instructions   albuterol 108 (90 Base) MCG/ACT Aers inhalation aerosol   Inhale 2 Puffs by mouth every 6 hours as needed for Shortness of Breath.  Dose:  2 Puff     aspirin EC 81 MG Tbec  Commonly known as:  ECOTRIN   Take 1 Tab by mouth every day.  Dose:  81 mg     baclofen 10 MG Tabs  Commonly known as:  LIORESAL   Take 10 mg by mouth 2 times a day.  Dose:  10 mg     cholestyramine 4 g packet  Commonly known as:  QUESTRAN   Take 1 Packet by mouth every day.  Dose:  1 Packet     FLUoxetine 10 MG Caps  Commonly known as:  PROZAC   TAKE ONE CAPSULE BY MOUTH ONCE A DAY     gabapentin 600 MG tablet  Commonly known as:  NEURONTIN   Take 600-1,200 mg by mouth 2 times a day. 600 MG IN MORNING 1200 MG AT NIGHT  Dose:  600-1200 mg     ivabradine 5 MG Tabs tablet  Commonly known as:  CORLANOR   Doctor's comments:  rx called  Take 1 Tab by mouth 2 times a day, with meals.  Dose:  5 mg     Melatonin 5 MG Tabs   Doctor's comments:  Takes two tabs at Bedtime (10 mg.)  Take 10 mg by mouth every bedtime (for sleep).  Dose:  10 mg     mupirocin 2 % Oint  Commonly known as:  BACTROBAN   Apply BID to infected sores x 1week     prednisoLONE acetate 1 % Susp  Commonly known as:  PRED FORTE   Place 1 Drop in right eye every 2 hours as needed (optic neurtis).  Dose:  1 Drop     promethazine 25 MG Supp  Commonly known as:  PHENERGAN   Insert 1 Suppository in rectum every 6 hours as needed for Nausea/Vomiting.  Dose:  25 mg     SITagliptin 100 MG Tabs  Commonly known as:  JANUVIA   Take 1 Tab by mouth every day.  Dose:  100 mg     sodium bicarbonate 650 MG Tabs  Commonly known as:  SODIUM BICARBONATE   Take 650 mg by mouth 2 times a day.  Dose:  650 mg     traZODone 100 MG Tabs  Commonly known as:  DESYREL   Take 1 Tab by mouth every  "bedtime.  Dose:  100 mg     VICTOZA 18 MG/3ML Sopn injection  Generic drug:  liraglutide   Inject 1.8 mg as instructed every day.  Dose:  1.8 mg     Vitamin D3 11964 units Caps   Take 1 Each by mouth every 7 days.  Dose:  1 Each     ziprasidone 80 MG Caps  Commonly known as:  GEODON   Take 80 mg by mouth 2 Times a Day.  Dose:  80 mg        STOP taking these medications    clindamycin 300 MG Caps  Commonly known as:  CLEOCIN     HYDROcodone-acetaminophen 5-325 MG Tabs per tablet  Commonly known as:  NORCO            Allergies  Allergies   Allergen Reactions   • Cefdinir Shortness of Breath and Itching     Tolerated 1/18/17  Tolerates ceftriaxone    • Depakote [Divalproex Sodium] Unspecified     Muscle spasms/muscle pain and weakness     • Doxycycline Anaphylaxis and Vomiting     RXN=unknown   • Abilify Unspecified     Headaches/muscle twitching     • Amitriptyline Unspecified     Headaches     • Amoxicillin Rash     Pt states \"I get a rash\".     • Ciprofloxacin Rash     Pt states \"I get a rash\".     • Clindamycin Nausea     Even with food     • Ees [Erythromycin] Vomiting and Nausea   • Flagyl [Metronidazole Hcl] Unspecified     \"eye problems\"     • Flomax [Tamsulosin Hydrochloride] Swelling   • Metformin Unspecified     Increased lactic acid      • Sulfa Drugs Hives and Rash     RXN=since childhood   • Tape Rash     Tears skin off   coban with Tegaderm tape ok  RXN=ongoing   • Vancomycin Itching     Pt becomes flushed in face and chest.   RXN=7/10/16   • Wound Dressing Adhesive Hives     By pt report   • Cephalexin [Keflex] Rash     Pt states she gets a rash with this medication  Tolerates ceftriaxone   • Erythromycin Rash     .   • Levofloxacin Unspecified     Leg muscle cramps   • Metronidazole Rash     .   • Valproic Acid Rash     .       DIET  Orders Placed This Encounter   Procedures   • Diet Order Diabetic     Standing Status:   Standing     Number of Occurrences:   1     Order Specific Question:   Diet:     " Answer:   Diabetic [3]     Order Specific Question:   Calorie modifications:     Answer:   1800 kcals [4]       ACTIVITY  As tolerated.  Weight bearing as tolerated    CONSULTATIONS  ID/Dr. Calderón    PROCEDURES    Component   Gram Stain Result   Few WBCs.   Rare Gram positive cocci.     Significant Indicator   NEG    Source   WND    Site   Chest    Culture Result Wound   Moderate growth mixed skin letty.        GRAM STAIN (Order [id number]) - Reflex for Order [id number]       CULTURE WOUND W/ GRAM STAIN (Order [id number])   Lab Information     Lab   [facility]              Last Resulted Time   Fri Aug 3, 2018  4:56 PM         LABORATORY  Lab Results   Component Value Date    SODIUM 140 08/04/2018    POTASSIUM 4.2 08/04/2018    CHLORIDE 107 08/04/2018    CO2 22 08/04/2018    GLUCOSE 101 (H) 08/04/2018    BUN 7 (L) 08/04/2018    CREATININE 0.74 08/04/2018    CREATININE 0.75 (L) 07/20/2010    GLOMRATE >59 07/20/2010        Lab Results   Component Value Date    WBC 5.4 08/04/2018    WBC 6.1 07/20/2010    HEMOGLOBIN 14.6 08/04/2018    HEMATOCRIT 42.8 08/04/2018    PLATELETCT 165 08/04/2018        Total time of the discharge process exceeds 55 minutes.   [Negative] : Integumentary [FreeTextEntry9] : pain LUE [de-identified] : finger/hand weakness

## 2025-07-30 ENCOUNTER — OFFICE VISIT (OUTPATIENT)
Dept: OPHTHALMOLOGY | Facility: MEDICAL CENTER | Age: 35
End: 2025-07-30
Payer: MEDICARE

## 2025-07-30 DIAGNOSIS — H52.13 MYOPIA OF BOTH EYES: ICD-10-CM

## 2025-07-30 DIAGNOSIS — H57.13 PAIN OF BOTH EYES: Primary | ICD-10-CM

## 2025-07-30 PROBLEM — G93.2 IIH (IDIOPATHIC INTRACRANIAL HYPERTENSION): Status: RESOLVED | Noted: 2020-02-27 | Resolved: 2025-07-30

## 2025-07-30 ASSESSMENT — REFRACTION_WEARINGRX
OS_CYLINDER: +1.75
OD_SPHERE: -1.75
OD_CYLINDER: +1.50
SPECS_TYPE: SVL
OD_AXIS: 134
OS_SPHERE: -1.00
OS_AXIS: 068

## 2025-07-30 ASSESSMENT — CONF VISUAL FIELD
OS_SUPERIOR_TEMPORAL_RESTRICTION: 0
OS_INFERIOR_NASAL_RESTRICTION: 0
OD_SUPERIOR_TEMPORAL_RESTRICTION: 0
OD_NORMAL: 1
OS_SUPERIOR_NASAL_RESTRICTION: 0
OD_INFERIOR_TEMPORAL_RESTRICTION: 0
OS_INFERIOR_TEMPORAL_RESTRICTION: 0
OS_NORMAL: 1
OD_INFERIOR_NASAL_RESTRICTION: 0
OD_SUPERIOR_NASAL_RESTRICTION: 0

## 2025-07-30 ASSESSMENT — VISUAL ACUITY
METHOD: SNELLEN - LINEAR
CORRECTION_TYPE: GLASSES
OD_CC+: -1
OS_CC: J1
OS_PH_CC: 20/25
OS_CC+: -1
OS_CC: 20/25
OD_CC: 20/20
OS_PH_CC+: +1
OD_CC: J1

## 2025-07-30 ASSESSMENT — REFRACTION_MANIFEST
METHOD_AUTOREFRACTION: 1
OS_CYLINDER: +2.00
OS_SPHERE: -1.25
OS_AXIS: 069
OD_AXIS: 103
OD_SPHERE: -3.00
OD_CYLINDER: +3.00

## 2025-07-30 ASSESSMENT — EXTERNAL EXAM - RIGHT EYE: OD_EXAM: NORMAL

## 2025-07-30 ASSESSMENT — CUP TO DISC RATIO
OD_RATIO: 0.5
OS_RATIO: 0.5

## 2025-07-30 ASSESSMENT — TONOMETRY
OD_IOP_MMHG: 21
OS_IOP_MMHG: 23
IOP_METHOD: ICARE

## 2025-07-30 ASSESSMENT — EXTERNAL EXAM - LEFT EYE: OS_EXAM: NORMAL

## 2025-07-30 ASSESSMENT — SLIT LAMP EXAM - LIDS
COMMENTS: NORMAL
COMMENTS: NORMAL

## 2025-07-30 NOTE — ASSESSMENT & PLAN NOTE
33 yo woman with history of Ehler's Danlos Syndrome, migraine headaches, possible small fiber neuropathy who  presents for another episode of bilateral eye pain and blurred vision    Previously established with Dr Franks. No evidence of optic neuropathy, RNFL thinning, afferent vision loss at prior exams. Work up includes negative MRI orbits, MRV head, normal opening pressure x2, neg NMO/MOG, neg AI. Etiology of episodes thought to be 2/2 migraine headaches    Exam 8/29/23 ([facility]): VA 20/20 OD 20/20 OS. RNFL 95 OD 92 OS    Exam 9/11/23 ([facility]): VA 20/20 OD 20/25 OS.  RNFL 91 OD 93 OS    Exam 7/30/25: no APD, VA 20/20-1 OD 20/25+1 OS. Color 8/8 OD and OS. Normal HVF OD (poor reliability) normal OS. EOM full. Ortho. Optic nerves pink with sharp disc margins. RNFL  OD 86 OS    Plan:   There is no evidence of optic neuropathy or afferent visual dysfunction on exam today. Pain did partially improve with topical proparacaine and pt does endorse dry eye symptoms. Recommended trial of increased use of AT TID to see if symptoms improve. Pt may still have a neuropathic component of eye pain as well given her history of possible small fiber neuropathy. She is on gabapentin currently, but may need adjustment of neuropathic pain management if pain persists.     Discussed that I have low suspicion her prior episodes of blurred vision/eye pain in  2023 were 2/2 optic neuritis given stable RNFL without thinning. Her current episode does not seem consistent with optic neuritis as blurred vision was episodic and resolved with apraclonidine. However that being said, patient is only 2 weeks from onset of symptoms therefore recommend repeat exam in 2-3 months to see if any RNFL thinning develops (rule out subtle retrobulbar lesion). Additionally recommended patient monitor sugars during blurred vision episodes to see if there is any correlation    -RTC in 2-3 months with Dr Franks  -Artificial tears  -Consider  adjustment of neuropathic pain medications if pain remains persistent

## 2025-07-30 NOTE — PROGRESS NOTES
Peds/Neuro Ophthalmology:   Stella Lawrence M.D.    Date & Time note created:    7/30/2025   4:23 PM     Referring MD / APRN:  Torres Brody M.D., No att. providers found    Patient ID:  Name:             Kristin Balderrama   YOB: 1990  Age:                 32 y.o.  female   MRN:               [id number]    Chief Complaint/Reason for Visit:     Other (Optic neuritis )      History of Present Illness:      Kristin Balderrama is a 33 yo woman who presents to [facility] care for vision loss and eye pain     She is a former patient of Dr Franks and was last seen 9/11/23. Per notes there was no concerns for IIH or optic neuritis. Eye pain and blurred vision were attributed to migraine.     2 weeks ago Kristin woke up with crusting and pain in the L eye. Over the course of a week she then began developing pain and blurred vision in the R eye. She endorsed intermittent blurred vision, sharp stabbing pains and weeping of her eyes. She went to Urgent Care and was told she had a small corneal abrasion at 2 o'clock OS. She was treated with bacitracin and polymycin. Due to persistent pain  she then presented to [facility] ED 7/19/25  for further evaluation. ESR and Crp were normal. The ED physician did not see any surface abnormalities but noted her pain improved with proparacaine drops. He spoke to the Dr Franks who recommended oral prednisone taper and to follow up in his clinic.  She was discharged on 80/60/40/20/10mg taper q3 days. Following her hospitalization she went back to [facility] for persistent eye pain. At this ED visit she was noted to be hyperglycemic to the 200s. CTH was completed and negative. She then followed up with Dr Soliz. Exam demosntrated VA 20/100-1 OD 20/150 OS, IOP 25 OU,  no disc edema, no disc pallor.     Kristin continues to  experience intermittent blurred vision, sharp stabbing pain, and lacrimation of her eyes. She uses her artificial tears once daily due to her  blepharitis. The steroids did help partially with the blurred vision and eye pain. She is on the 10mg prednisone daily. She reports home fasting sugars prior to steroids are typically 150    Kristin has a complex neurological history and is currently following with Dr Lance and [facility] neurology. She is seeing Dr Lance for possible autonomic dysfunction (from small fiber neuropathy) vs functional neurological disoder.  Her migraine headaches are being managed by [facility].  She is taking lamotrigine, Topamax and getting Botox.  For her severe gastroparesis, she was started on Mestinon 60 mg TID. Skin biopsy is pending for the small fiber neuropathy Of note patient has had multiple admissions for multiple neurological complaints including vision loss. Work up includes negative MRI brain, lumbar puncture, full spine MRI, paraneoplastic panel, Mogg antibody and aquaporin antibody, muscle biopsy, EMG/NCS.    Prior Ophthalmology Notes:   8/29/2023-recent admission for bilateral decreased vision associated with ocular pain on eye movements.  Complete work-up including MRI and MRV that were negative.  Because of a past history of possible IIH a lumbar puncture was performed.  Opening pressure was normal.  1 white blood cell.  NMO, MOG, and AI were negative.  She was treated with 3 days of intravenous Solu-Medrol and is currently on an oral steroid taper.  She states that her visual acuity is returned to normal.  She believes this was related to recent institution of Humira a month ago.  However on examination today there is no evidence of any underlying optic nerve disorder.  OCT RNFL 95 OD 92 OS with 0.4 cup sharp discs no swelling or pallor.  Therefore I am uncertain as to the etiology of her vision loss.  We will monitor on a steroid taper.    9/11/2023-2 recent emergency room visits for episodes of the ocular pain first on the left associated with blurry vision this lasted for a few hours then dissipated.   Then a second episode in the right eye.  She was given a trial of Pred forte drops with no significant improvement.  She was also given Ketalar rack which seemed to help slightly.  On examination today her central acuity of as well as color vision is normal.  She has no optic nerve swelling and her OCT neurofibrillary thickness is 91 OD 93 OS.  There is no evidence of a recurrent optic neuritis thus I suspect that her episodes of blurry vision associated with periocular pain and headaches is more either migraine or cluster.  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  She is being seen by the neurology nurse practitioner Sonal Rivera at [facility].  I would asked that she try to quantify better the type of headache condition she has and further treatments.  She may end up requiring a consultation with Ben Tenorio.                 Review of Systems:  Review of Systems   Eyes:         Optic neuritis   Corneal abrasions    All other systems reviewed and are negative.      Past Medical History:   Past Medical History:   Diagnosis Date    Abdominal pain     Anginal syndrome     Random chest pain especially with tachycardia    Apnea, sleep     Arthritis     ASTHMA     when around smoke    Back pain     Borderline personality disorder (HCC)     Chickenpox     Chronic UTI 09/18/2010    Daytime sleepiness     Dental disorder 03/08/2021    Infected tooth    Depression     Diabetes (HCC)     Diarrhea     Incontinece    Disorder of thyroid     Hashimoto's    Fatigue     Frequent headaches     Heart burn     History of falling     Inappropriate sinus tachycardia (HCC) 2016    Statusp post ablation by Dr. Kumar.    Migraine     Mitochondrial disease (HCC)     Multiple personality disorder (HCC)     Obesity     Pain     Back, 7/10    Painful joint     PCOS (polycystic ovarian syndrome)     Pneumonia 2012 12/2020    POTS (postural orthostatic tachycardia syndrome)     Psychosis (HCC)     Ringing in ears     Scoliosis  "    Shortness of breath     O2 as needed    Sinus tachycardia 10/31/2013    Sleep apnea     CPAP \"pulmonary doctor took me off mid year 2016\"    Snoring     Supraventricular tachycardia (HCC) 04/10/2019    Transverse myelitis (HCC)     2/8/22: Per pt: not anymore    Tuberculosis     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    Urinary bladder disorder     Suprapubic cath. 2/8/22: Not anymore.     Urinary incontinence     Weakness     Wears glasses        Past Surgical History:  Past Surgical History:   Procedure Laterality Date    SD CYSTOSCOPY,INSERT URETERAL STENT Right 2/12/2024    Procedure: CYSTOSCOPY, WITH RIGHT URETEROSCOPY, WITH LITHOTRIPSY, WITH INSERTION OF RIGHT URETERAL STENT;  Surgeon: Josafat Roberson M.D.;  Location: [facility];  Service: Urology    SD CYSTO/URETERO/PYELOSCOPY, DX Right 2/12/2024    Procedure: URETEROSCOPY;  Surgeon: Josafat Roberson M.D.;  Location: [facility];  Service: Urology    LASERTRIPSY Right 2/12/2024    Procedure: LITHOTRIPSY, USING LASER;  Surgeon: Josafat Roberson M.D.;  Location: [facility];  Service: Urology    LAPAROSCOPIC INGUINAL HERNIA REPAIR Right 07/21/2023    Procedure: LAPAROSCOPIC RIGHT INGUINAL HERNIA REPAIR WITH MESH;  Surgeon: Joe Noyola M.D.;  Location: [facility];  Service: General    HERNIA REPAIR Right 07/21/2023    PB PERCUT FIX PBOX/NECK FEMUR FX Left 01/28/2023    Procedure: FIXATION, HIP, USING CANNULATED SCREW;  Surgeon: Noman Abdul M.D.;  Location: SURGERY [facility];  Service: Orthopedics    SD LAP,DIAGNOSTIC ABDOMEN  02/14/2022    Procedure: LAPAROSCOPY;  Surgeon: Seamus Pisano M.D.;  Location: SURGERY SAME DAY [facility];  Service: Gynecology    OVARIAN CYSTECTOMY Right 02/14/2022    Procedure: EXCISION, CYST, OVARY;  Surgeon: Seamus Pisano M.D.;  Location: SURGERY SAME DAY [facility];  Service: Gynecology    BOWEL STIMULATOR INSERTION  03/10/2021    Procedure: INSERTION, ELECTRODE LEADS AND " PULSE GENERATOR, NEUROSTIMULATOR, SACRAL - REMOVAL OF INTERSTIM WITH REPLACEMENT OF SACRAL NEUROMODULATION DEVICE;  Surgeon: Joe Noyola M.D.;  Location: SURGERY [facility];  Service: General    MUSCLE BIOPSY Right 01/26/2017    Procedure: MUSCLE BIOPSY - THIGH;  Surgeon: Isidro Vigil M.D.;  Location: SURGERY [facility];  Service:     GASTROSCOPY WITH BALLOON DILATATION N/A 01/04/2017    Procedure: GASTROSCOPY WITH DILATATION;  Surgeon: Torres Vargas M.D.;  Location: SURGERY [facility];  Service:     BOWEL STIMULATOR INSERTION  07/13/2016    Procedure: BOWEL STIMULATOR INSERTION FOR PERMANENT INTERSTIM SACRAL IMPLANT;  Surgeon: Joe Noyola M.D.;  Location: SURGERY [facility];  Service:     RECOVERY  01/27/2016    Procedure: CATH LAB EP STUDY WITH SINUS NODE MODIFICATION LA;  Surgeon: [facility];  Location: SURGERY PRE-POST PROC UNIT [facility];  Service:     OTHER CARDIAC SURGERY  01/2016    cardiac ablation    NEURO DEST FACET L/S W/IG SNGL  04/21/2015    Performed by Reza Tabor at SURGERY [facility] ORS    LUMBAR FUSION ANTERIOR  08/21/2012    Performed by SUSIE SAWANT at SURGERY [facility] ORS    ALYSSA BY LAPAROSCOPY  08/29/2010    Performed by SHAYY JOHNS at SURGERY [facility] ORS    LAMINOTOMY      OTHER ABDOMINAL SURGERY      TONSILLECTOMY      tonsillectomy       Current Outpatient Medications:  Current Outpatient Medications   Medication Sig Dispense Refill    SITagliptin (JANUVIA) 100 MG Tab Take 1 Tablet by mouth every day. 30 Tablet 3    bacitracin-polymyxin b (POLYSPORIN) 500-13126 UNIT/GM Ointment Apply 0.5 inch of medication to left eye 4 times daily for 5 days 3.5 g 0    predniSONE (DELTASONE) 20 MG Tab Take 4 tabs (80 mg) by mouth daily on days 1-3, then take 3 tabs (60 mg) daily on days 4-6, then take 2 tabs (40 mg) daily on days 7-9, then take 1 tab (20 mg) daily on days 10-12, then take 1/2 tab (10 mg) daily on days 13-15 30 Tablet 0    phenazopyridine  (PYRIDIUM) 200 MG Tab Take 1 Tablet by mouth 3 times a day as needed for Mild Pain or Moderate Pain. 10 Tablet 0    miconazole (MICONAZOLE 7) 2 % Cream apply 1 applicatorful at bedtime Vaginal weekly (Maintenance) 45 g 5    miconazole (MICONAZOLE 7) 2 % Cream apply 1 applicatorful at bedtime Vaginal Once a day 45 g 0    carvedilol (COREG) 25 MG Tab Take 25 mg by mouth 2 times a day with meals.      metoprolol SR (TOPROL XL) 50 MG TABLET SR 24 HR Take 1 Tablet by mouth 2 times a day. 180 Tablet 3    ziprasidone (GEODON) 40 MG Cap Take 1 capsule by mouth at bedtime. 90 Capsule 1    lamoTRIgine (LAMICTAL) 25 MG Tab Take 2 Tablets by mouth 2 times a day. 360 Tablet 1    tizanidine (ZANAFLEX) 4 MG Tab Take 1 Tablet by mouth Three Times A Day as needed for muscle cramping and spasms (Patient taking differently: Take 4 mg by mouth 3 times a day as needed (muscle spasms).) 90 Tablet 1    pyridostigmine (MESTINON) 60 MG Tab Take 1 Tablet by mouth 3 times a day. (Patient taking differently: Take 60 mg by mouth 2 times a day.) 90 Tablet 3    BOTOX 200 units Recon Soln injection Inject 200 Units as directed one time. Every 12 weeks      gabapentin (NEURONTIN) 400 MG Cap TAKE 3 CAPSULES BY MOUTH THREE TIMES DAILY.  FOR 30 DAY SUPPLY. DX: M79.7 270 Capsule 0    isosorbide mononitrate SR (IMDUR) 60 MG TABLET SR 24 HR Take 1 Tablet by mouth every morning. 90 Tablet 3    ivabradine (CORLANOR) 7.5 MG Tab tablet Take 1 Tablet by mouth 2 times a day with meals. 180 Tablet 3    amLODIPine (NORVASC) 5 MG Tab Take 1 tablet by mouth every day. 90 Tablet 3    nitroglycerin (NITROLINGUAL) 0.4 MG/SPRAY Solution Place 1 Spray under the tongue as needed (chest pains). 12 g 11    ondansetron (ZOFRAN ODT) 4 MG TABLET DISPERSIBLE Dissolve 1 Tablet by mouth every four hours as needed for Nausea/Vomiting (give PO if no IV route available). 10 Tablet 0    albuterol 108 (90 Base) MCG/ACT Aero Soln inhalation aerosol Inhale 1-2 Puffs every four  "hours as needed for Shortness of Breath.      dicyclomine (BENTYL) 10 MG Cap Take 1 Capsule by mouth 2 times a day as needed for abdominal cramping/discomfort 60 Capsule 1    Ranolazine 1000 MG TABLET SR 12 HR Take 1 Tablet by mouth 2 times a day. 180 Tablet 3    aspirin 81 MG EC tablet Take 1 Tablet by mouth every day. 100 Tablet 3    docusate sodium (COLACE) 250 MG capsule Take 250 mg by mouth 2 times a day.      spironolactone (ALDACTONE) 50 MG Tab Take 1 Tablet by mouth every day. 90 Tablet 3    omeprazole (PRILOSEC) 20 MG delayed-release capsule Take 2 Capsules by mouth 2 times a day. 180 Capsule 3    topiramate (TOPAMAX) 50 MG tablet Take 1 Tablet by mouth 2 times a day. 180 Tablet 4    diphenhydrAMINE (BENADRYL) 25 MG Tab Take 50 mg by mouth at bedtime.      Melatonin 10 MG Tab Take 10 mg by mouth at bedtime.       No current facility-administered medications for this visit.       Allergies:  Allergies   Allergen Reactions    Cefdinir Shortness of Breath and Itching     Tolerated 1/18/17  Tolerates ceftriaxone  Tolerated augmentin 8/2019     Depakote [Divalproex Sodium] Unspecified     Muscle spasms/muscle pain and weakness      Doxycycline Anaphylaxis and Vomiting     Other reaction(s): pustules/blisters  Other reaction(s): stomach pain    Montelukast [Singulair] Unspecified     Cardiac effusion    Vancomycin Itching     Pt becomes flushed in face and chest.   RXN=7/10/16    Wound Dressing Adhesive Rash     By pt report-\"removes skin totally off\"    Amitriptyline Unspecified     Headaches      Amoxicillin Rash      Tolerates augmentin    Aripiprazole [Abilify] Unspecified     Headaches/muscle twitching      Clindamycin Nausea         Other reaction(s): nausea stomach pain    Erythromycin Rash     .  Other reaction(s): nausea stomach pain    Flomax [Tamsulosin Hydrochloride] Swelling    Hydromorphone      Other reaction(s): vomiting    Metformin Unspecified     Increased lactic acid     Other reaction(s): " "itching and rash/nausea vomiting    Sulfa Drugs Hives, Itching, Myalgia and Unspecified     Muscle pain and weakness    Other reaction(s): unknown    Tamsulosin Swelling     Swelling of legs    Tape Rash     Tears skin off  coban with Tegaderm tape ok intermittently  RXN=ongoing    Sulfamethoxazole W-Trimethoprim Rash    Ciprofloxacin Rash          Keflex Rash     Pt states she gets a rash with this medication  Tolerates ceftriaxone  Can take with Benadryl    Levofloxacin Unspecified     Leg muscle cramps    Metronidazole Rash     \"Vision problems\"  Other reaction(s): vision problems       Family History:  Family History   Problem Relation Age of Onset    Hypertension Mother     Sleep Apnea Mother     Heart Disease Mother     Other Mother         hypothryod    No Known Problems Sister     Other Sister         Narcolepsy;fibromyalsia;bone;nerve    Genitourinary () Problems Sister         endometriosis    Hypertension Maternal Uncle     Heart Disease Maternal Grandmother     Hypertension Maternal Grandmother     Cancer Neg Hx        Social History:  Social History     Socioeconomic History    Marital status: Single     Spouse name: Not on file    Number of children: Not on file    Years of education: Not on file    Highest education level: Not on file   Occupational History    Not on file   Tobacco Use    Smoking status: Never     Passive exposure: Never    Smokeless tobacco: Never   Vaping Use    Vaping status: Never Used   Substance and Sexual Activity    Alcohol use: No     Alcohol/week: 0.0 oz    Drug use: Never    Sexual activity: Not Currently     Birth control/protection: Implant   Other Topics Concern    Not on file   Social History Narrative    ** Merged History Encounter **          Social Drivers of Health     Financial Resource Strain: Medium Risk (4/30/2021)    Overall Financial Resource Strain (CARDIA)     Difficulty of Paying Living Expenses: Somewhat hard   Food Insecurity: No Food Insecurity " (7/3/2025)    Hunger Vital Sign     Worried About Running Out of Food in the Last Year: Never true     Ran Out of Food in the Last Year: Never true   Transportation Needs: No Transportation Needs (7/3/2025)    PRAPARE - Transportation     Lack of Transportation (Medical): No     Lack of Transportation (Non-Medical): No   Physical Activity: Not on file   Stress: Not on file   Social Connections: Not on file   Intimate Partner Violence: Not At Risk (7/3/2025)    Humiliation, Afraid, Rape, and Kick questionnaire     Fear of Current or Ex-Partner: No     Emotionally Abused: No     Physically Abused: No     Sexually Abused: No   Housing Stability: Low Risk  (7/3/2025)    Housing Stability Vital Sign     Unable to Pay for Housing in the Last Year: No     Number of Times Moved in the Last Year: 0     Homeless in the Last Year: No          Physical Exam:  Physical Exam    Oriented x 3  Weight/BMI: There is no height or weight on file to calculate BMI.  There were no vitals taken for this visit.    Base Eye Exam       Visual Acuity (Snellen - Linear)         Right Left    Dist cc 20/20 -1 20/25 -1    Dist ph cc  20/25 +1    Near cc J1 J1      Correction: Glasses              Tonometry ([facility], 7:53 AM)         Right Left    Pressure 21 23              Pupils         Dark Light Shape React APD    Right 4 3 Round Brisk None    Left 4 3 Round Brisk None              Visual Fields         Right Left     Full Full              Extraocular Movement         Right Left     Full, Ortho Full, Ortho              Neuro/Psych       Oriented x3: Yes    Mood/Affect: Normal                  Additional Tests       Color         Right Left    Ishihara 8/8 8/8              Stereo       Fly: +    Animals: 3/3    Circles: 4/9                  Strabismus Exam       Method: Alternate cover    Correction: cc    Observations: Ortho      Distance Near Near +3DS N Bifocals                      - - - - - -   - - - - - -                      Ortho  - -   - -  Ortho  - -  - -  Ortho                      - - - - - -   - - - - - -                       Slit Lamp and Fundus Exam       External Exam         Right Left    External Normal Normal    Partial improvement with topical proparacaine OS              Slit Lamp Exam         Right Left    Lids/Lashes Normal Normal    Conjunctiva/Sclera White and quiet White and quiet    Cornea Clear Clear    Anterior Chamber Deep and quiet Deep and quiet    Iris Round and reactive Round and reactive    Lens Clear Clear              Fundus Exam         Right Left    Disc pink, sharp disc margins pink, sharp disc margins    C/D Ratio 0.5 0.5    Macula Normal Normal                  Refraction       Wearing Rx         Sphere Cylinder Axis    Right -1.75 +1.50 134    Left -1.00 +1.75 068      Age: 1m    Type: SVL              Manifest Refraction (Auto)         Sphere Cylinder Axis    Right -3.00 +3.00 103    Left -1.25 +2.00 069                    Pertinent Lab/Test/Imaging Review:  Lumbar puncture 8/19/23  OP < 5mm H20     Lumbar puncture 12/24/19  OP [id number]     CTH 7/23/25  No acute intracranial abnormality     MRI orbits wwo 8/18/23  1.  Partially empty sella. There is an association with idiopathic intracranial hypertension.  2.  Otherwise, unremarkable MRI of the orbits. No imaging evidence of acute optic neuritis.    MRV head wo 8/18/23  1.  Normal variant dominant right transverse-sigmoid sinus and dominant right internal jugular vein.  2.  No evidence of dural venous sinus thrombosis      Labs 7/19/25  ESR 17  CRP < 0.30    8/18/23 NMO < 1.5   4/21/02 NMO < 1.5  5/24/19 NMO < 1.0  9/11/18 NMO 0    8/29/23 MOG < 1:10  4/21/20 MOG < 1:10       Assessment and Plan:     Pain of both eyes  33 yo woman with history of Ehler's Danlos Syndrome, migraine headaches, possible small fiber neuropathy who  presents for another episode of bilateral eye pain and blurred vision    Previously established with Dr Franks. No evidence of optic  neuropathy, RNFL thinning, afferent vision loss at prior exams. Work up includes negative MRI orbits, MRV head, normal opening pressure x2, neg NMO/MOG, neg IA. Etiology of episodes thought to be 2/2 migraine headaches    Exam 8/29/23 ([facility]): VA 20/20 OD 20/20 OS. RNFL 95 OD 92 OS    Exam 9/11/23 ([facility]): VA 20/20 OD 20/25 OS.  RNFL 91 OD 93 OS    Exam 7/30/25: no APD, VA 20/20-1 OD 20/25+1 OS. Color 8/8 OD and OS. Normal HVF OD (poor reliability) normal OS. EOM full. Ortho. Optic nerves pink with sharp disc margins. RNFL  OD 86 OS    Plan:   There is no evidence of optic neuropathy or afferent visual dysfunction on exam today. Pain did partially improve with topical proparacaine and pt does endorse dry eye symptoms. Recommended trial of increased use of AT TID to see if symptoms improve. Pt may still have a neuropathic component of eye pain as well given her history of possible small fiber neuropathy. She is on gabapentin currently, but may need adjustment of neuropathic pain management if pain persists.     Discussed that I have low suspicion her prior episodes of blurred vision/eye pain in  2023 were 2/2 optic neuritis given stable RNFL without thinning. Her current episode does not seem consistent with optic neuritis as blurred vision was episodic and resolved with apraclonidine. However that being said, patient is only 2 weeks from onset of symptoms therefore recommend repeat exam in 2-3 months to see if any RNFL thinning develops (rule out subtle retrobulbar lesion). Additionally recommended patient monitor sugars during blurred vision episodes to see if there is any correlation    -RTC in 2-3 months with Dr Franks  -Artificial tears  -Consider adjustment of neuropathic pain medications if pain remains persistent     Myopia of both eyes  OD: -3.00 +3.00 x 103  OS: -1.25 +2.00 x 69  VA 20/20-1 OD 20/25+1 OS  Stable           Stella Lawrence M.D.    72 total minutes were spent reviewing imaging,  records, examining the patient and documenting.

## 2025-07-30 NOTE — PROCEDURES
OD: normal. VFI 99%, MD -1.64, PSD 1.56    OS: normal ,rare superior misses. VFI 97%, MD -2.35, PSD 1.75

## 2025-08-01 ENCOUNTER — APPOINTMENT (OUTPATIENT)
Dept: PHYSICAL MEDICINE AND REHAB | Facility: MEDICAL CENTER | Age: 35
End: 2025-08-01
Payer: MEDICARE

## 2025-08-01 ASSESSMENT — FIBROSIS 4 INDEX: FIB4 SCORE: 0.56

## 2025-08-01 ASSESSMENT — PAIN SCALES - GENERAL: PAINLEVEL_OUTOF10: 5=MODERATE PAIN

## 2025-08-03 ENCOUNTER — OFFICE VISIT (OUTPATIENT)
Dept: URGENT CARE | Facility: CLINIC | Age: 35
End: 2025-08-03
Payer: MEDICARE

## 2025-08-03 VITALS
HEART RATE: 78 BPM | TEMPERATURE: 97 F | WEIGHT: 251.5 LBS | DIASTOLIC BLOOD PRESSURE: 72 MMHG | SYSTOLIC BLOOD PRESSURE: 118 MMHG | RESPIRATION RATE: 16 BRPM | BODY MASS INDEX: 42.94 KG/M2 | OXYGEN SATURATION: 98 % | HEIGHT: 64 IN

## 2025-08-03 DIAGNOSIS — H60.311 ACUTE DIFFUSE OTITIS EXTERNA OF RIGHT EAR: Primary | ICD-10-CM

## 2025-08-03 PROCEDURE — RXMED WILLOW AMBULATORY MEDICATION CHARGE: Performed by: STUDENT IN AN ORGANIZED HEALTH CARE EDUCATION/TRAINING PROGRAM

## 2025-08-03 PROCEDURE — 3078F DIAST BP <80 MM HG: CPT | Performed by: STUDENT IN AN ORGANIZED HEALTH CARE EDUCATION/TRAINING PROGRAM

## 2025-08-03 PROCEDURE — 99213 OFFICE O/P EST LOW 20 MIN: CPT | Performed by: STUDENT IN AN ORGANIZED HEALTH CARE EDUCATION/TRAINING PROGRAM

## 2025-08-03 PROCEDURE — 3074F SYST BP LT 130 MM HG: CPT | Performed by: STUDENT IN AN ORGANIZED HEALTH CARE EDUCATION/TRAINING PROGRAM

## 2025-08-03 RX ORDER — NEOMYCIN SULFATE, POLYMYXIN B SULFATE AND HYDROCORTISONE 10; 3.5; 1 MG/ML; MG/ML; [USP'U]/ML
4 SUSPENSION/ DROPS AURICULAR (OTIC) 3 TIMES DAILY
Qty: 10 ML | Refills: 0 | Status: SHIPPED | OUTPATIENT
Start: 2025-08-03 | End: 2025-08-13

## 2025-08-03 ASSESSMENT — ENCOUNTER SYMPTOMS
CHILLS: 0
FEVER: 0

## 2025-08-03 ASSESSMENT — FIBROSIS 4 INDEX: FIB4 SCORE: 0.56

## 2025-08-04 ENCOUNTER — PHARMACY VISIT (OUTPATIENT)
Dept: PHARMACY | Facility: MEDICAL CENTER | Age: 35
End: 2025-08-04
Payer: COMMERCIAL

## 2025-08-04 PROCEDURE — RXMED WILLOW AMBULATORY MEDICATION CHARGE: Performed by: PHYSICIAN ASSISTANT

## 2025-08-04 PROCEDURE — RXMED WILLOW AMBULATORY MEDICATION CHARGE: Performed by: NURSE PRACTITIONER

## 2025-08-04 PROCEDURE — RXMED WILLOW AMBULATORY MEDICATION CHARGE: Performed by: STUDENT IN AN ORGANIZED HEALTH CARE EDUCATION/TRAINING PROGRAM

## 2025-08-05 ENCOUNTER — SPECIALTY PHARMACY (OUTPATIENT)
Dept: NEUROLOGY | Facility: MEDICAL CENTER | Age: 35
End: 2025-08-05
Payer: MEDICARE

## 2025-08-06 ENCOUNTER — PHARMACY VISIT (OUTPATIENT)
Dept: PHARMACY | Facility: MEDICAL CENTER | Age: 35
End: 2025-08-06
Payer: COMMERCIAL

## 2025-08-07 ASSESSMENT — ENCOUNTER SYMPTOMS
SHORTNESS OF BREATH: 0
PND: 0
SYNCOPE: 0
NEAR-SYNCOPE: 1
LIGHT-HEADEDNESS: 0
PALPITATIONS: 1
ORTHOPNEA: 0
DIZZINESS: 0
DYSPNEA ON EXERTION: 0
HEADACHES: 0

## 2025-08-08 ENCOUNTER — OFFICE VISIT (OUTPATIENT)
Dept: CARDIOLOGY | Facility: MEDICAL CENTER | Age: 35
End: 2025-08-08
Payer: MEDICARE

## 2025-08-08 VITALS
HEIGHT: 64 IN | WEIGHT: 251 LBS | RESPIRATION RATE: 16 BRPM | OXYGEN SATURATION: 98 % | BODY MASS INDEX: 42.85 KG/M2 | DIASTOLIC BLOOD PRESSURE: 78 MMHG | SYSTOLIC BLOOD PRESSURE: 122 MMHG | HEART RATE: 70 BPM

## 2025-08-08 DIAGNOSIS — R07.89 ATYPICAL CHEST PAIN: ICD-10-CM

## 2025-08-08 DIAGNOSIS — I25.85 CHRONIC CORONARY MICROVASCULAR DYSFUNCTION: ICD-10-CM

## 2025-08-08 DIAGNOSIS — I47.11 INAPPROPRIATE SINUS TACHYCARDIA (HCC): Primary | ICD-10-CM

## 2025-08-08 DIAGNOSIS — I20.89 MICROVASCULAR ANGINA (HCC): ICD-10-CM

## 2025-08-08 DIAGNOSIS — G90.A POTS (POSTURAL ORTHOSTATIC TACHYCARDIA SYNDROME): ICD-10-CM

## 2025-08-08 DIAGNOSIS — I10 PRIMARY HYPERTENSION: ICD-10-CM

## 2025-08-08 PROCEDURE — RXMED WILLOW AMBULATORY MEDICATION CHARGE

## 2025-08-08 PROCEDURE — 3078F DIAST BP <80 MM HG: CPT

## 2025-08-08 PROCEDURE — 3074F SYST BP LT 130 MM HG: CPT

## 2025-08-08 PROCEDURE — 99212 OFFICE O/P EST SF 10 MIN: CPT

## 2025-08-08 PROCEDURE — 99214 OFFICE O/P EST MOD 30 MIN: CPT

## 2025-08-08 RX ORDER — ALBUTEROL SULFATE 5 MG/ML
2.5 SOLUTION RESPIRATORY (INHALATION) EVERY 4 HOURS PRN
COMMUNITY
End: 2025-08-28 | Stop reason: SDUPTHER

## 2025-08-08 RX ORDER — ISOSORBIDE MONONITRATE 120 MG/1
120 TABLET, EXTENDED RELEASE ORAL EVERY MORNING
Qty: 90 TABLET | Refills: 3 | Status: SHIPPED | OUTPATIENT
Start: 2025-08-08

## 2025-08-08 ASSESSMENT — FIBROSIS 4 INDEX: FIB4 SCORE: 0.56

## 2025-08-14 ENCOUNTER — PHARMACY VISIT (OUTPATIENT)
Dept: PHARMACY | Facility: MEDICAL CENTER | Age: 35
End: 2025-08-14
Payer: COMMERCIAL

## 2025-08-14 ENCOUNTER — OFFICE VISIT (OUTPATIENT)
Dept: MEDICAL GROUP | Facility: MEDICAL CENTER | Age: 35
End: 2025-08-14
Payer: MEDICARE

## 2025-08-14 VITALS
SYSTOLIC BLOOD PRESSURE: 104 MMHG | DIASTOLIC BLOOD PRESSURE: 66 MMHG | HEART RATE: 68 BPM | RESPIRATION RATE: 16 BRPM | OXYGEN SATURATION: 97 % | TEMPERATURE: 97.9 F | HEIGHT: 64 IN | WEIGHT: 254.19 LBS | BODY MASS INDEX: 43.4 KG/M2

## 2025-08-14 DIAGNOSIS — G56.22 ULNAR NEUROPATHY AT ELBOW, LEFT: ICD-10-CM

## 2025-08-14 DIAGNOSIS — G47.33 OSA (OBSTRUCTIVE SLEEP APNEA): ICD-10-CM

## 2025-08-14 DIAGNOSIS — I10 PRIMARY HYPERTENSION: Primary | ICD-10-CM

## 2025-08-14 DIAGNOSIS — E11.69 TYPE 2 DIABETES MELLITUS WITH OTHER SPECIFIED COMPLICATION, WITHOUT LONG-TERM CURRENT USE OF INSULIN (HCC): ICD-10-CM

## 2025-08-14 DIAGNOSIS — E66.01 MORBID OBESITY (HCC): ICD-10-CM

## 2025-08-14 PROBLEM — Z88.9 MULTIPLE DRUG ALLERGIES: Chronic | Status: ACTIVE | Noted: 2025-03-17

## 2025-08-14 PROBLEM — K40.90 INGUINAL HERNIA: Status: RESOLVED | Noted: 2023-11-14 | Resolved: 2025-08-14

## 2025-08-14 PROBLEM — M54.50 LOW BACK PAIN: Status: RESOLVED | Noted: 2025-04-10 | Resolved: 2025-08-14

## 2025-08-14 PROBLEM — R10.9 ABDOMINAL PAIN: Status: RESOLVED | Noted: 2024-10-03 | Resolved: 2025-08-14

## 2025-08-14 PROBLEM — H53.9 VISION CHANGES: Status: RESOLVED | Noted: 2022-06-28 | Resolved: 2025-08-14

## 2025-08-14 PROBLEM — R07.9 ACUTE CHEST PAIN: Status: RESOLVED | Noted: 2025-02-15 | Resolved: 2025-08-14

## 2025-08-14 PROBLEM — J45.41 MODERATE PERSISTENT ASTHMA WITH ACUTE EXACERBATION: Status: RESOLVED | Noted: 2019-08-14 | Resolved: 2025-08-14

## 2025-08-14 PROBLEM — N20.0 KIDNEY STONE: Status: RESOLVED | Noted: 2024-03-29 | Resolved: 2025-08-14

## 2025-08-14 PROBLEM — R07.9 CHEST PAIN: Status: RESOLVED | Noted: 2025-02-16 | Resolved: 2025-08-14

## 2025-08-14 PROBLEM — R74.01 TRANSAMINITIS: Status: RESOLVED | Noted: 2018-08-08 | Resolved: 2025-08-14

## 2025-08-14 PROBLEM — R39.9: Status: RESOLVED | Noted: 2024-12-05 | Resolved: 2025-08-14

## 2025-08-14 PROCEDURE — 3078F DIAST BP <80 MM HG: CPT | Performed by: STUDENT IN AN ORGANIZED HEALTH CARE EDUCATION/TRAINING PROGRAM

## 2025-08-14 PROCEDURE — 3074F SYST BP LT 130 MM HG: CPT | Performed by: STUDENT IN AN ORGANIZED HEALTH CARE EDUCATION/TRAINING PROGRAM

## 2025-08-14 PROCEDURE — 99214 OFFICE O/P EST MOD 30 MIN: CPT | Performed by: STUDENT IN AN ORGANIZED HEALTH CARE EDUCATION/TRAINING PROGRAM

## 2025-08-14 ASSESSMENT — ENCOUNTER SYMPTOMS
WHEEZING: 0
CHILLS: 0
NAUSEA: 0
DIZZINESS: 0
PALPITATIONS: 0
WEIGHT LOSS: 0
VOMITING: 0
HEADACHES: 0
SHORTNESS OF BREATH: 0
FEVER: 0

## 2025-08-14 ASSESSMENT — FIBROSIS 4 INDEX: FIB4 SCORE: 0.56

## 2025-08-20 PROCEDURE — RXMED WILLOW AMBULATORY MEDICATION CHARGE: Performed by: PHYSICIAN ASSISTANT

## 2025-08-27 ENCOUNTER — PHARMACY VISIT (OUTPATIENT)
Dept: PHARMACY | Facility: MEDICAL CENTER | Age: 35
End: 2025-08-27
Payer: COMMERCIAL

## 2025-08-27 PROCEDURE — RXMED WILLOW AMBULATORY MEDICATION CHARGE: Performed by: PHYSICIAN ASSISTANT

## 2025-08-27 PROCEDURE — RXMED WILLOW AMBULATORY MEDICATION CHARGE: Performed by: ORTHOPAEDIC SURGERY

## 2025-08-27 PROCEDURE — RXMED WILLOW AMBULATORY MEDICATION CHARGE: Performed by: STUDENT IN AN ORGANIZED HEALTH CARE EDUCATION/TRAINING PROGRAM

## 2025-08-27 PROCEDURE — RXMED WILLOW AMBULATORY MEDICATION CHARGE

## 2025-08-27 RX ORDER — PREDNISONE 50 MG/1
TABLET ORAL
Qty: 3 TABLET | Refills: 0 | OUTPATIENT
Start: 2025-08-27

## 2025-08-27 RX ORDER — ALBUTEROL SULFATE 90 UG/1
INHALANT RESPIRATORY (INHALATION)
Qty: 6.7 G | Refills: 0 | OUTPATIENT
Start: 2025-08-27

## 2025-08-28 ENCOUNTER — OFFICE VISIT (OUTPATIENT)
Dept: MEDICAL GROUP | Facility: MEDICAL CENTER | Age: 35
End: 2025-08-28
Payer: MEDICARE

## 2025-08-28 ENCOUNTER — PHARMACY VISIT (OUTPATIENT)
Dept: PHARMACY | Facility: MEDICAL CENTER | Age: 35
End: 2025-08-28
Payer: COMMERCIAL

## 2025-08-28 VITALS
HEART RATE: 79 BPM | OXYGEN SATURATION: 97 % | WEIGHT: 254 LBS | BODY MASS INDEX: 43.36 KG/M2 | HEIGHT: 64 IN | DIASTOLIC BLOOD PRESSURE: 64 MMHG | RESPIRATION RATE: 18 BRPM | SYSTOLIC BLOOD PRESSURE: 102 MMHG | TEMPERATURE: 98.5 F

## 2025-08-28 DIAGNOSIS — R06.00 DYSPNEA, UNSPECIFIED TYPE: ICD-10-CM

## 2025-08-28 DIAGNOSIS — J06.9 ACUTE URI: ICD-10-CM

## 2025-08-28 DIAGNOSIS — J06.9 UPPER RESPIRATORY TRACT INFECTION, UNSPECIFIED TYPE: Primary | ICD-10-CM

## 2025-08-28 DIAGNOSIS — J45.909 MODERATE ASTHMA, UNSPECIFIED WHETHER COMPLICATED, UNSPECIFIED WHETHER PERSISTENT: ICD-10-CM

## 2025-08-28 RX ORDER — ALBUTEROL SULFATE 5 MG/ML
2.5 SOLUTION RESPIRATORY (INHALATION) EVERY 4 HOURS PRN
Qty: 30 ML | Refills: 3 | Status: SHIPPED | OUTPATIENT
Start: 2025-08-28

## 2025-08-28 RX ORDER — FLUTICASONE PROPIONATE AND SALMETEROL 100; 50 UG/1; UG/1
1 POWDER RESPIRATORY (INHALATION) 2 TIMES DAILY PRN
Qty: 60 EACH | Refills: 0 | Status: SHIPPED | OUTPATIENT
Start: 2025-08-28

## 2025-08-28 RX ORDER — IPRATROPIUM BROMIDE AND ALBUTEROL SULFATE 2.5; .5 MG/3ML; MG/3ML
3 SOLUTION RESPIRATORY (INHALATION) ONCE
Status: COMPLETED | OUTPATIENT
Start: 2025-08-28 | End: 2025-08-28

## 2025-08-28 RX ADMIN — IPRATROPIUM BROMIDE AND ALBUTEROL SULFATE 3 ML: 2.5; .5 SOLUTION RESPIRATORY (INHALATION) at 17:07

## 2025-08-28 ASSESSMENT — FIBROSIS 4 INDEX: FIB4 SCORE: 0.56

## 2025-08-29 ENCOUNTER — HOSPITAL ENCOUNTER (EMERGENCY)
Facility: MEDICAL CENTER | Age: 35
End: 2025-08-29
Attending: STUDENT IN AN ORGANIZED HEALTH CARE EDUCATION/TRAINING PROGRAM
Payer: MEDICARE

## 2025-08-29 ENCOUNTER — APPOINTMENT (OUTPATIENT)
Dept: RADIOLOGY | Facility: MEDICAL CENTER | Age: 35
End: 2025-08-29
Attending: STUDENT IN AN ORGANIZED HEALTH CARE EDUCATION/TRAINING PROGRAM
Payer: MEDICARE

## 2025-08-29 VITALS
TEMPERATURE: 97 F | SYSTOLIC BLOOD PRESSURE: 146 MMHG | DIASTOLIC BLOOD PRESSURE: 82 MMHG | WEIGHT: 255 LBS | RESPIRATION RATE: 17 BRPM | HEIGHT: 64 IN | OXYGEN SATURATION: 99 % | HEART RATE: 77 BPM | BODY MASS INDEX: 43.54 KG/M2

## 2025-08-29 DIAGNOSIS — N89.8 VAGINAL DISCHARGE: ICD-10-CM

## 2025-08-29 DIAGNOSIS — R06.02 SHORTNESS OF BREATH: Primary | ICD-10-CM

## 2025-08-29 LAB
ALBUMIN SERPL BCP-MCNC: 4.5 G/DL (ref 3.2–4.9)
ALBUMIN/GLOB SERPL: 2 G/DL
ALP SERPL-CCNC: 63 U/L (ref 30–99)
ALT SERPL-CCNC: 21 U/L (ref 2–50)
ANION GAP SERPL CALC-SCNC: 14 MMOL/L (ref 7–16)
APPEARANCE UR: CLEAR
AST SERPL-CCNC: 17 U/L (ref 12–45)
BACTERIA #/AREA URNS HPF: ABNORMAL /HPF
BASOPHILS # BLD AUTO: 0.8 % (ref 0–1.8)
BASOPHILS # BLD: 0.06 K/UL (ref 0–0.12)
BILIRUB SERPL-MCNC: 0.3 MG/DL (ref 0.1–1.5)
BILIRUB UR QL STRIP.AUTO: ABNORMAL
BUN SERPL-MCNC: 25 MG/DL (ref 8–22)
C TRACH DNA SPEC QL NAA+PROBE: NEGATIVE
CALCIUM ALBUM COR SERPL-MCNC: 9.1 MG/DL (ref 8.5–10.5)
CALCIUM SERPL-MCNC: 9.5 MG/DL (ref 8.5–10.5)
CANDIDA DNA VAG QL PROBE+SIG AMP: NEGATIVE
CASTS URNS QL MICRO: ABNORMAL /LPF (ref 0–2)
CHLORIDE SERPL-SCNC: 111 MMOL/L (ref 96–112)
CO2 SERPL-SCNC: 14 MMOL/L (ref 20–33)
COLOR UR: ABNORMAL
CREAT SERPL-MCNC: 1.09 MG/DL (ref 0.5–1.4)
D DIMER PPP IA.FEU-MCNC: <0.27 UG/ML (FEU) (ref 0–0.5)
EKG IMPRESSION: NORMAL
EOSINOPHIL # BLD AUTO: 0.05 K/UL (ref 0–0.51)
EOSINOPHIL NFR BLD: 0.6 % (ref 0–6.9)
EPITHELIAL CELLS 1715: ABNORMAL /HPF (ref 0–5)
ERYTHROCYTE [DISTWIDTH] IN BLOOD BY AUTOMATED COUNT: 44.1 FL (ref 35.9–50)
FLUAV RNA SPEC QL NAA+PROBE: NEGATIVE
FLUBV RNA SPEC QL NAA+PROBE: NEGATIVE
G VAGINALIS DNA VAG QL PROBE+SIG AMP: NEGATIVE
GFR SERPLBLD CREATININE-BSD FMLA CKD-EPI: 68 ML/MIN/1.73 M 2
GLOBULIN SER CALC-MCNC: 2.3 G/DL (ref 1.9–3.5)
GLUCOSE SERPL-MCNC: 113 MG/DL (ref 65–99)
GLUCOSE UR STRIP.AUTO-MCNC: NEGATIVE MG/DL
HCG SERPL QL: NEGATIVE
HCT VFR BLD AUTO: 41.8 % (ref 37–47)
HGB BLD-MCNC: 14.3 G/DL (ref 12–16)
IMM GRANULOCYTES # BLD AUTO: 0.06 K/UL (ref 0–0.11)
IMM GRANULOCYTES NFR BLD AUTO: 0.8 % (ref 0–0.9)
KETONES UR STRIP.AUTO-MCNC: ABNORMAL MG/DL
LEUKOCYTE ESTERASE UR QL STRIP.AUTO: ABNORMAL
LYMPHOCYTES # BLD AUTO: 2.32 K/UL (ref 1–4.8)
LYMPHOCYTES NFR BLD: 29.6 % (ref 22–41)
MCH RBC QN AUTO: 31.8 PG (ref 27–33)
MCHC RBC AUTO-ENTMCNC: 34.2 G/DL (ref 32.2–35.5)
MCV RBC AUTO: 93.1 FL (ref 81.4–97.8)
MICRO URNS: ABNORMAL
MONOCYTES # BLD AUTO: 0.63 K/UL (ref 0–0.85)
MONOCYTES NFR BLD AUTO: 8 % (ref 0–13.4)
N GONORRHOEA DNA SPEC QL NAA+PROBE: NEGATIVE
NEUTROPHILS # BLD AUTO: 4.73 K/UL (ref 1.82–7.42)
NEUTROPHILS NFR BLD: 60.2 % (ref 44–72)
NITRITE UR QL STRIP.AUTO: NEGATIVE
NRBC # BLD AUTO: 0 K/UL
NRBC BLD-RTO: 0 /100 WBC (ref 0–0.2)
NT-PROBNP SERPL IA-MCNC: 108 PG/ML (ref 0–125)
PH UR STRIP.AUTO: 5.5 [PH] (ref 5–8)
PLATELET # BLD AUTO: 213 K/UL (ref 164–446)
PMV BLD AUTO: 11.3 FL (ref 9–12.9)
POTASSIUM SERPL-SCNC: 3.3 MMOL/L (ref 3.6–5.5)
PROT SERPL-MCNC: 6.8 G/DL (ref 6–8.2)
PROT UR QL STRIP: NEGATIVE MG/DL
RBC # BLD AUTO: 4.49 M/UL (ref 4.2–5.4)
RBC # URNS HPF: ABNORMAL /HPF (ref 0–2)
RBC UR QL AUTO: NEGATIVE
RSV RNA SPEC QL NAA+PROBE: NEGATIVE
SARS-COV-2 RNA RESP QL NAA+PROBE: NEGATIVE
SODIUM SERPL-SCNC: 139 MMOL/L (ref 135–145)
SP GR UR STRIP.AUTO: 1.03
SPECIMEN SOURCE: NORMAL
T VAGINALIS DNA VAG QL PROBE+SIG AMP: NEGATIVE
TROPONIN T SERPL-MCNC: 9 NG/L (ref 6–19)
UROBILINOGEN UR STRIP.AUTO-MCNC: 1 EU/DL
WBC # BLD AUTO: 7.9 K/UL (ref 4.8–10.8)
WBC #/AREA URNS HPF: ABNORMAL /HPF

## 2025-08-29 PROCEDURE — 700105 HCHG RX REV CODE 258: Mod: UD | Performed by: STUDENT IN AN ORGANIZED HEALTH CARE EDUCATION/TRAINING PROGRAM

## 2025-08-29 PROCEDURE — 87491 CHLMYD TRACH DNA AMP PROBE: CPT

## 2025-08-29 PROCEDURE — 93005 ELECTROCARDIOGRAM TRACING: CPT | Mod: TC

## 2025-08-29 PROCEDURE — 700102 HCHG RX REV CODE 250 W/ 637 OVERRIDE(OP): Mod: UD | Performed by: STUDENT IN AN ORGANIZED HEALTH CARE EDUCATION/TRAINING PROGRAM

## 2025-08-29 PROCEDURE — 84484 ASSAY OF TROPONIN QUANT: CPT

## 2025-08-29 PROCEDURE — 85379 FIBRIN DEGRADATION QUANT: CPT

## 2025-08-29 PROCEDURE — 71045 X-RAY EXAM CHEST 1 VIEW: CPT

## 2025-08-29 PROCEDURE — 87637 SARSCOV2&INF A&B&RSV AMP PRB: CPT | Performed by: STUDENT IN AN ORGANIZED HEALTH CARE EDUCATION/TRAINING PROGRAM

## 2025-08-29 PROCEDURE — 36415 COLL VENOUS BLD VENIPUNCTURE: CPT

## 2025-08-29 PROCEDURE — 84703 CHORIONIC GONADOTROPIN ASSAY: CPT

## 2025-08-29 PROCEDURE — 81001 URINALYSIS AUTO W/SCOPE: CPT

## 2025-08-29 PROCEDURE — 87510 GARDNER VAG DNA DIR PROBE: CPT

## 2025-08-29 PROCEDURE — 87591 N.GONORRHOEAE DNA AMP PROB: CPT

## 2025-08-29 PROCEDURE — 87660 TRICHOMONAS VAGIN DIR PROBE: CPT

## 2025-08-29 PROCEDURE — 83880 ASSAY OF NATRIURETIC PEPTIDE: CPT

## 2025-08-29 PROCEDURE — 99285 EMERGENCY DEPT VISIT HI MDM: CPT

## 2025-08-29 PROCEDURE — 96360 HYDRATION IV INFUSION INIT: CPT

## 2025-08-29 PROCEDURE — A9270 NON-COVERED ITEM OR SERVICE: HCPCS | Mod: UD | Performed by: STUDENT IN AN ORGANIZED HEALTH CARE EDUCATION/TRAINING PROGRAM

## 2025-08-29 PROCEDURE — 93005 ELECTROCARDIOGRAM TRACING: CPT | Mod: TC | Performed by: STUDENT IN AN ORGANIZED HEALTH CARE EDUCATION/TRAINING PROGRAM

## 2025-08-29 PROCEDURE — 80053 COMPREHEN METABOLIC PANEL: CPT

## 2025-08-29 PROCEDURE — 85025 COMPLETE CBC W/AUTO DIFF WBC: CPT

## 2025-08-29 PROCEDURE — 87480 CANDIDA DNA DIR PROBE: CPT

## 2025-08-29 RX ORDER — POTASSIUM CHLORIDE 1500 MG/1
40 TABLET, EXTENDED RELEASE ORAL ONCE
Status: COMPLETED | OUTPATIENT
Start: 2025-08-29 | End: 2025-08-29

## 2025-08-29 RX ORDER — SODIUM CHLORIDE, SODIUM LACTATE, POTASSIUM CHLORIDE, CALCIUM CHLORIDE 600; 310; 30; 20 MG/100ML; MG/100ML; MG/100ML; MG/100ML
1000 INJECTION, SOLUTION INTRAVENOUS ONCE
Status: COMPLETED | OUTPATIENT
Start: 2025-08-29 | End: 2025-08-29

## 2025-08-29 RX ADMIN — SODIUM CHLORIDE, POTASSIUM CHLORIDE, SODIUM LACTATE AND CALCIUM CHLORIDE 1000 ML: 600; 310; 30; 20 INJECTION, SOLUTION INTRAVENOUS at 02:45

## 2025-08-29 RX ADMIN — POTASSIUM CHLORIDE 40 MEQ: 1500 TABLET, EXTENDED RELEASE ORAL at 02:51

## 2025-08-29 ASSESSMENT — FIBROSIS 4 INDEX: FIB4 SCORE: 0.56

## (undated) DEVICE — FIBER LASER MOSES 200 UM (1/EA)

## (undated) DEVICE — SUTURE 0 VICRYL PLUS UR-6 - 27 INCH (36/BX)

## (undated) DEVICE — GLOVE BIOGEL ECLIPSE PF LATEX SIZE 8.0  (50PR/BX)

## (undated) DEVICE — GLOVE BIOGEL SZ 7 SURGICAL PF LTX - (50PR/BX 4BX/CA)

## (undated) DEVICE — PAD SANITARY 11IN MAXI IND WRAPPED  (12EA/PK 24PK/CA)

## (undated) DEVICE — SYRINGE DISP. 50CC LS - (40/BX)

## (undated) DEVICE — SPONGE GAUZESTER 4 X 4 4PLY - (128PK/CA)

## (undated) DEVICE — SYRINGE SAFETY 3 ML 18 GA X 1 1/2 BLUNT LL (100/BX 8BX/CA)

## (undated) DEVICE — MASK ANESTHESIA ADULT  - (100/CA)

## (undated) DEVICE — ELECTRODE DUAL RETURN W/ CORD - (50/PK)

## (undated) DEVICE — JAGTOME RX 44 PRELOADED .035 X 260CM

## (undated) DEVICE — SUCTION INSTRUMENT YANKAUER BULBOUS TIP W/O VENT (50EA/CA)

## (undated) DEVICE — LACTATED RINGERS INJ 1000 ML - (14EA/CA 60CA/PF)

## (undated) DEVICE — MAT PATIENT POSITIONING PREVALON (10EA/CA)

## (undated) DEVICE — DEVICE 5MM ABSRB STRAP FIXATION  (6EA/BX)

## (undated) DEVICE — GOLD PROBE 7FR (5/BX)

## (undated) DEVICE — BLOCK BITE ENDOSCOPIC 2809 - (100/BX) INTERMEDIATE

## (undated) DEVICE — SOD. CHL. INJ. 0.9% 1000 ML - (14EA/CA 60CA/PF)

## (undated) DEVICE — BLADE SURGICAL #11 - (50/BX)

## (undated) DEVICE — SUTURE 3-0 VICRYL PLUS SH - 8X 18 INCH (12/BX)

## (undated) DEVICE — PACK LAPAROSCOPY - (1/CA)

## (undated) DEVICE — CAPTIVATOR II-15MM ROUND STIFF  (40/BX)

## (undated) DEVICE — SHEATH ACCESS URETERAL NAVIGATOR HD STAINLESS STEEL HYDROPHILIC L28 CM OD11-13 FR (1EA)

## (undated) DEVICE — COVER FOOT UNIVERSAL DISP. - (25EA/CA)

## (undated) DEVICE — GOWN WARMING STANDARD FLEX - (30/CA)

## (undated) DEVICE — GLOVE BIOGEL ECLIPSE PF LATEX SIZE 7.5

## (undated) DEVICE — SUTURE GENERAL

## (undated) DEVICE — WIRE GUIDE SENSOR DUAL FLEX - 5/BX

## (undated) DEVICE — SODIUM CHL IRRIGATION 0.9% 1000ML (12EA/CA)

## (undated) DEVICE — GLOVE BIOGEL PI INDICATOR SZ 6.5 SURGICAL PF LF - (50/BX 4BX/CA)

## (undated) DEVICE — NEEDLE SAFETY 25 GA X 5/8 IN LL HYPO (100/BX 12BX/CA) WAS #6351

## (undated) DEVICE — HEAD HOLDER JUNIOR/ADULT

## (undated) DEVICE — GLOVE BIOGEL PI INDICATOR SZ 7.5 SURGICAL PF LF -(50/BX 4BX/CA)

## (undated) DEVICE — KIT ROOM DECONTAMINATION

## (undated) DEVICE — DRAPE 36X28IN RAD CARM BND BG - (25/CA) O

## (undated) DEVICE — CHLORAPREP 26 ML APPLICATOR - ORANGE TINT(25/CA)

## (undated) DEVICE — SPEEDBAND SUPERVIEW SUPER 7 (4/BX)

## (undated) DEVICE — SET LEADWIRE 5 LEAD BEDSIDE DISPOSABLE ECG (1SET OF 5/EA)

## (undated) DEVICE — GLOVE BIOGEL INDICATOR SZ 7.5 SURGICAL PF LTX - (50PR/BX 4BX/CA)

## (undated) DEVICE — TUBING CLEARLINK DUO-VENT - C-FLO (48EA/CA)

## (undated) DEVICE — CATHETER IV SAFETY 24 GA X 3/4 (50EA/BX)

## (undated) DEVICE — DERMABOND ADVANCED - (12EA/BX)

## (undated) DEVICE — TRAP POLYP E-TRAP (25EA/BX)

## (undated) DEVICE — BITE BLOCK ADULT 60FR (100EA/CA)

## (undated) DEVICE — PACK MINOR BASIN - (2EA/CA)

## (undated) DEVICE — PROTECTOR ULNA NERVE - (36PR/CA)

## (undated) DEVICE — SCOPE DIGITAL URETEROSCOPE DISPOSABLE (10EA/PK)

## (undated) DEVICE — SODIUM CHL. INJ. 0.9% 500ML (24EA/CA 50CA/PF)

## (undated) DEVICE — TROCAR 5X100 NON BLADED Z-TH - READ KII (6/BX)

## (undated) DEVICE — GOWN SURGEONS X-LARGE - DISP. (30/CA)

## (undated) DEVICE — SENSOR OXIMETER ADULT SPO2 RD SET (20EA/BX)

## (undated) DEVICE — KIT  I.V. START (100EA/CA)

## (undated) DEVICE — SUTURE 4-0 VICRYL PLUSFS-1 - 27 INCH (36/BX)

## (undated) DEVICE — WATER IRRIGATION STERILE 1000ML (12EA/CA)

## (undated) DEVICE — GLOVE SZ 7 BIOGEL PI MICRO - PF LF (50PR/BX 4BX/CA)

## (undated) DEVICE — COVER LIGHT HANDLE ALC PLUS DISP (18EA/BX)

## (undated) DEVICE — SENSOR SPO2 NEO LNCS ADHESIVE (20/BX) SEE USER NOTES

## (undated) DEVICE — CAPTIVATOR II-25MM ROUND STIFF  (40/BX)

## (undated) DEVICE — GOWN SURGICAL X-LARGE ULTRA - FILM-REINFORCED (20/CA)

## (undated) DEVICE — SODIUM CHL. IRRIGATION 0.9% 3000ML (4EA/CA 65CA/PF)

## (undated) DEVICE — BALLOON CRE WG 12-15MM 240CM 5.5 F G

## (undated) DEVICE — BALLOON CRE WG 10-12MM 240CM 5.5 F G

## (undated) DEVICE — Device

## (undated) DEVICE — SLEEVE, VASO, THIGH, MED

## (undated) DEVICE — SUTURE 4-0 MONOCRYL PLUS PS-2 - 27 INCH (36/BX)

## (undated) DEVICE — ELECTRODE 850 FOAM ADHESIVE - HYDROGEL RADIOTRNSPRNT (50/PK)

## (undated) DEVICE — KIT ANESTHESIA W/CIRCUIT & 3/LT BAG W/FILTER (20EA/CA)

## (undated) DEVICE — REMOTE CONTROL

## (undated) DEVICE — SPONGE GAUZE NON-STERILE 4X4 - (2000/CA 10PK/CA)

## (undated) DEVICE — CANISTER SUCTION RIGID RED 1500CC (40EA/CA)

## (undated) DEVICE — TOWEL STOP TIMEOUT SAFETY FLAG (40EA/CA)

## (undated) DEVICE — TUBE CONNECTING SUCTION - CLEAR PLASTIC STERILE 72 IN (50EA/CA)

## (undated) DEVICE — TROCAR Z THREAD11MM OPTICAL - NON BLADED(6/BX)

## (undated) DEVICE — SHEET PEDIATRIC LAPAROTOMY - (10/CA)

## (undated) DEVICE — BASKET ZERO 1.9FR X 120CM

## (undated) DEVICE — SYRINGE 10 ML CONTROL LL (25EA/BX 4BX/CA)

## (undated) DEVICE — DRILL BIT CANNULATED 5.0MM (3TX1=3)

## (undated) DEVICE — CONNECTOR HOSE NEPTUNE FOR CYSTO ROOM

## (undated) DEVICE — ERBE SIDE FIRE - (10/CA)

## (undated) DEVICE — CLOSURE SKIN STRIP 1/2 X 4 IN - (STERI STRIP) (50/BX 4BX/CA)

## (undated) DEVICE — DEVICE HEMOSTATIC CLIPPING RESOLUTION 360 DEGREES (20EA/BX)

## (undated) DEVICE — MASK, LARYNGEAL AIRWAY #4

## (undated) DEVICE — GLOVE BIOGEL SZ 7.5 SURGICAL PF LTX - (50PR/BX 4BX/CA)

## (undated) DEVICE — GLOVE, BIOGEL ECLIPSE, SZ 7.0, PF LTX (50/BX)

## (undated) DEVICE — ARMREST CRADLE FOAM - (2PR/PK 12PR/CA)

## (undated) DEVICE — SET EXTENSION WITH 2 PORTS (48EA/CA) ***PART #2C8610 IS A SUBSTITUTE*****

## (undated) DEVICE — CANISTER SUCTION 3000ML MECHANICAL FILTER AUTO SHUTOFF MEDI-VAC NONSTERILE LF DISP  (40EA/CA)

## (undated) DEVICE — STAPLER SKIN DISP - (6/BX 10BX/CA) VISISTAT

## (undated) DEVICE — TROCAR Z THREAD12MM OPTICAL - NON BLADED (6/BX)

## (undated) DEVICE — NEPTUNE 4 PORT MANIFOLD - (20/PK)

## (undated) DEVICE — GLOVE BIOGEL ECLIPSE  PF LATEX SIZE 6.5 (50PR/BX)

## (undated) DEVICE — DEVICE ENCORE INFLATION 20CC (FORMERLY BREEZE)

## (undated) DEVICE — BAG RETRIEVAL 10ML (10EA/BX)

## (undated) DEVICE — DRAPE LARGE 3 QUARTER - (20/CA)

## (undated) DEVICE — BANDAID SHEER STRIP 3/4 IN (100EA/BX 12BX/CA)

## (undated) DEVICE — TRAY SRGPRP PVP IOD WT PRP - (20/CA)

## (undated) DEVICE — CONTAINER SPECIMEN BAG OR - STERILE 4 OZ W/LID (100EA/CA)

## (undated) DEVICE — PACK CYSTO III (2EA/CA)

## (undated) DEVICE — WATER IRRIG. STER 3000 ML - (4/CA)

## (undated) DEVICE — PACK LAP CHOLE OR - (2EA/CA)

## (undated) DEVICE — DRAPE IOBAN LARGE 2 INCISE FILM (5EA/CA)

## (undated) DEVICE — CATHETER IV SAFETY 22 GA X 1 (50EA/BX)

## (undated) DEVICE — BAG SPONGE COUNT 10.25 X 32 - BLUE (250/CA)

## (undated) DEVICE — SPONGE GAUZESTER. 2X2 4-PL - (2/PK 50PK/BX 30BX/CS)

## (undated) DEVICE — LEAD SET 6 DISP. EKG NIHON KOHDEN

## (undated) DEVICE — DRAPE LAPAROTOMY T SHEET - (12EA/CA)

## (undated) DEVICE — SUTURE 2-0 VICRYL PLUS CT-1 36 (36PK/BX)"

## (undated) DEVICE — SYRINGE SAFETY 5 ML 18 GA X 1-1/2 BLUNT LL (100/BX 4BX/CA)

## (undated) DEVICE — PACK MAJOR ORTHO - (2EA/CA)

## (undated) DEVICE — DRESSING TRANSPARENT FILM TEGADERM 2.375 X 2.75"  (100EA/BX)"

## (undated) DEVICE — GLOVE, LITE (PAIR)

## (undated) DEVICE — BLADE SURGICAL #15 - (50/BX 3BX/CA)

## (undated) DEVICE — DRESSING TRANSPARENT FILM TEGADERM 4 X 4.75" (50EA/BX)"

## (undated) DEVICE — BASKET RETRIEVAL TWISTER PLUS 26MM (5EA/BX)

## (undated) DEVICE — SITE INJ 7/8IN IV M LL ADPR - (100/CA) THIS IS A CUSTOM ITEM AND HAS A 30 DAY LEAD TIME

## (undated) DEVICE — SET TUBING PNEUMOCLEAR HIGH FLOW SMOKE EVACUATION (10EA/BX)

## (undated) DEVICE — LEAD IMPLANT KIT

## (undated) DEVICE — BAG URODRAIN WITH TUBING - (20/CA)

## (undated) DEVICE — SUTURE 2-0 VICRYL PLUS CT-1 - 8 X 18 INCH(12/BX)

## (undated) DEVICE — HEADREST PRONEVIEW LARGE - (10/CA)

## (undated) DEVICE — TROCAR SEPARATOR 5X55 - 6/BX

## (undated) DEVICE — SEALER ARTICULATING TISSUE NSEAL (6/BX)

## (undated) DEVICE — SYSTEM DISSECTION BALLOON  KII ROUND WITH 2 EACH 5MM LOW PROFILE TROCARS (3EA/BX)

## (undated) DEVICE — CATHETER IV 20 GA X 1-1/4 ---SURG.& SDS ONLY--- (50EA/BX)

## (undated) DEVICE — CANNULA W/SEAL 5X100 Z-THRE - ADED KII (12/BX)

## (undated) DEVICE — STERI STRIP COMPOUND BENZOIN - TINCTURE 0.6ML WITH APPLICATOR (40EA/BX)

## (undated) DEVICE — FILM CASSETTE ENDO

## (undated) DEVICE — GLOVE BIOGEL SZ 6.5 SURGICAL PF LTX (50PR/BX 4BX/CA)

## (undated) DEVICE — GOLD PROBE 10FR (5/BX)

## (undated) DEVICE — TROCAR LAPSCP 100MM 12MM NTHRD - (6/BX)

## (undated) DEVICE — CANNULA W/ SUPPLY TUBING O2 - (50/CA)

## (undated) DEVICE — TOWELS CLOTH SURGICAL - (4/PK 20PK/CA)

## (undated) DEVICE — SCISSORS 5MM CVD (6EA/BX)

## (undated) DEVICE — DRAPESURG STERI-DRAPE LONG - (10/BX 4BX/CA)

## (undated) DEVICE — RESTRAINTS LIMB DISP. - (12/BX 4BX/CA)

## (undated) DEVICE — SYRINGE SAFETY 10 ML 18 GA X 1 1/2 BLUNT LL (100/BX 4BX/CA)

## (undated) DEVICE — CATHETER IV SAFETY 20 GA X 1-1/4 (50/BX)

## (undated) DEVICE — TUBE SUCTION YANKAUER  1/4 X 6FT (20EA/CA)"